# Patient Record
Sex: MALE | Race: WHITE | NOT HISPANIC OR LATINO | Employment: UNEMPLOYED | URBAN - METROPOLITAN AREA
[De-identification: names, ages, dates, MRNs, and addresses within clinical notes are randomized per-mention and may not be internally consistent; named-entity substitution may affect disease eponyms.]

---

## 2017-01-05 ENCOUNTER — MYC MEDICAL ADVICE (OUTPATIENT)
Dept: FAMILY MEDICINE | Facility: CLINIC | Age: 27
End: 2017-01-05

## 2017-01-05 DIAGNOSIS — N39.0 URINARY TRACT INFECTION ASSOCIATED WITH INDWELLING URETHRAL CATHETER, INITIAL ENCOUNTER (H): Primary | ICD-10-CM

## 2017-01-05 DIAGNOSIS — T83.511A URINARY TRACT INFECTION ASSOCIATED WITH INDWELLING URETHRAL CATHETER, INITIAL ENCOUNTER (H): Primary | ICD-10-CM

## 2017-01-14 ENCOUNTER — TRANSFERRED RECORDS (OUTPATIENT)
Dept: HEALTH INFORMATION MANAGEMENT | Facility: CLINIC | Age: 27
End: 2017-01-14

## 2017-01-16 ENCOUNTER — CARE COORDINATION (OUTPATIENT)
Dept: CARE COORDINATION | Facility: CLINIC | Age: 27
End: 2017-01-16

## 2017-01-16 NOTE — Clinical Note
"    Your frustration is understandable.   I will see what I can do on my end.     Teddy Zamora RN  Care Coordinator  [facility]  [phone]  [place]: [place], [place], [facility], [place]    From: Ayana Prasad [[email or web address]]   Sent: Friday, January 27, 2017 11:16 AM  To: Teddy Zamora  Subject: RE: Transportation resources    I didnt/dont have all the paperwork when i saw him and i asked him if i could just fax it to him as it came in the mail so i didnt have to make an appointment every time i got something he needed to fill out. I faxed the transportation paperwork yesterday to the # he gave me... i made an appt with the pain clinic already - the earliest avaliable wasnt until March.   I dont know how to get refills or pain/anxiety medication for the weekend. She prescribed me new pain meds today but i was told i could not get them and im completly out as of today. Also, she was only willing to refill some of my meds so i did contact dr pond care team but she didnt know if it could be done today. I dont see my psychiatriat until wednesday so im not sure what to do.  Also, what i meant by 'i dont know what to do next' is different people keep telling me to apply for this ... or see this person... and im getting frustrated and confused.   Ayana     On Jan 27, 2017 10:39 AM, \"Teddy Zamora\" <[email or web address]> wrote:  Ayana,     I don t see any notes in your chart about paperwork.  Did you bring these in when you saw Dr. Ragsdale on the 1-20?   It looks like you have an [place] type of insurance and could also explore transportation through:     [place] Product Transportation Services:  [place]: MNET: [phone]  Blue Cross [place]:  Blue Ride: [phone]     I don t see any upcoming appointments in your [place] chart. I do see that there was a Pain Clinic referral put in for you after your visit today. If you do not hear from the pain clinic to schedule the initial " appointment the number to call is [phone].     As far as what to do next below are the patient instructions from the last 2 visits.      From SUSANNAH Freeman 1-27  Patient Instructions      I have prescribed a longer acting pain medication, reduce the amount of oxycodone immediate release that you are using. You need to schedule with pain management, to continue receiving ongoing pain medication for your health issue; we will co-manage your medication based on their recommendations.  Do not use more pain medication than what is needed, as this can be habit forming. I will let you know the results of your lab results. Follow up if you have any health questions or concerns.  Work with the , for concerns regarding transportation to your appointments. Make sure that you see your surgeon next week as you are scheduled, sooner if your symptoms worsen.              From Dr. Ragsdale  1-20  Patient Instructions    Wear your compression stockings as much as possible.  Continue on the Lovenox until you are moving around better.  Continue with the physical therapy.  See urology, neurosurgery and psych as planned.    Let your psychiatrist know that you'd rather not be on benzodiazepines and neither would I so adjusting medical therapy to minimize anxiety is helpful. Please provide suggestions if symptoms are not well controlled.                  Teddy Zamora RN  Care Coordinator  [facility]  [phone]  [place]: [place], [place]     From: Ayana Prasad [[email or web address]]   Sent: Friday, January 27, 2017 10:08 AM  To: Teddy Zamora  Subject: Re: Transportation resources     Im just waiting for the paperwork to get filled out by dr. Ragsdale and then i have rides thru my insurance. Dr. Ragsdale said the  could help me with paperwork, health care dirwctive and kind of guide me in a direction because i have no idea of what im suppose to be  "doing and who im suppose to be seeing when. My nurse wanted me to see there  but ahe said my insurance didnt cover it so, If you or someone you know could help me it would be greatly appreciated.   Ayana         On Jan 27, 2017 9:40 AM, \"Teddy Zamora\" <[email or web address]> wrote:  Ayana,     Below are some transportation resources to explore.  Let me know if you need anything else.     TRANSPORTATION:  [employer]     [phone]  [employer]     [phone]  Transit Link     [phone]  [employer]     [phone]  [employer]     [phone]  [employer]      [phone]  Transportation Resource Center     [phone]  Clinic Cab ([facility])    [phone]     COMMUNITY RESOURCE LINE:  [facility] 2-1-1     211 from land line  [phone]  Disability Linkage Line     [phone]           Teddy Zamora RN  Care Coordinator  [facility]  [phone]  [place]: [facility], [place]         The information transmitted in this e-mail is intended only for the person or entity to which it is addressed and may contain confidential and/or privileged material, including 'protected health information'. If you are not the intended recipient, you are hereby notified that any review, retransmission, dissemination, distribution, or copying of this message is strictly prohibited. If you have received this communication in error, please destroy and delete this message from any computer and contact us immediately by return e-mail.          The information transmitted in this e-mail is intended only for the person or entity to which it is addressed and may contain confidential and/or privileged material, including 'protected health information'. If you are not the intended recipient, you are hereby notified that any review, retransmission, dissemination, distribution, or copying of this message is strictly prohibited. If " you have received this communication in error, please destroy and delete this message from any computer and contact us immediately by return e-mail.

## 2017-01-16 NOTE — Clinical Note
Below are some resources you might find helpful.    TRANSPORTATION:  [employer]     [phone]  [employer]     [phone]  Transit Link     [phone]  [employer]     [phone]  [place]     [phone]  [facility]      [phone]  Transportation Resource Center     [phone]  Clinic Cab ([facility])    [phone]    COMMUNITY RESOURCE LINE:  [facility] 2-1-1     211 from land line  [phone]  Disability Linkage Line     [phone]

## 2017-01-16 NOTE — Clinical Note
TRANSPORTATION:    [place] Product Transportaion Services:  [place]: MNET: [phone]  [employer] Cross [place]:  Blue Ride: [phone]

## 2017-01-20 ENCOUNTER — OFFICE VISIT (OUTPATIENT)
Dept: FAMILY MEDICINE | Facility: CLINIC | Age: 27
End: 2017-01-20
Payer: COMMERCIAL

## 2017-01-20 VITALS
TEMPERATURE: 98.7 F | SYSTOLIC BLOOD PRESSURE: 105 MMHG | BODY MASS INDEX: 30.86 KG/M2 | WEIGHT: 192 LBS | HEIGHT: 66 IN | DIASTOLIC BLOOD PRESSURE: 80 MMHG

## 2017-01-20 DIAGNOSIS — G83.4 CAUDA EQUINA SYNDROME WITH NEUROGENIC BLADDER (H): Primary | ICD-10-CM

## 2017-01-20 DIAGNOSIS — F31.11 BIPOLAR 1 DISORDER, MANIC, MILD (H): ICD-10-CM

## 2017-01-20 DIAGNOSIS — Z23 NEED FOR PROPHYLACTIC VACCINATION AND INOCULATION AGAINST INFLUENZA: ICD-10-CM

## 2017-01-20 DIAGNOSIS — G82.20 PARAPLEGIA (H): ICD-10-CM

## 2017-01-20 PROCEDURE — 90686 IIV4 VACC NO PRSV 0.5 ML IM: CPT | Performed by: FAMILY MEDICINE

## 2017-01-20 PROCEDURE — 99496 TRANSJ CARE MGMT HIGH F2F 7D: CPT | Mod: 25 | Performed by: FAMILY MEDICINE

## 2017-01-20 PROCEDURE — G0008 ADMIN INFLUENZA VIRUS VAC: HCPCS | Performed by: FAMILY MEDICINE

## 2017-01-20 RX ORDER — OLANZAPINE 5 MG/1
5 TABLET ORAL
Status: ON HOLD | COMMUNITY
Start: 2017-01-18 | End: 2017-06-08

## 2017-01-20 RX ORDER — BISACODYL 10 MG
10 SUPPOSITORY, RECTAL RECTAL
COMMUNITY
Start: 2017-01-18 | End: 2017-05-09

## 2017-01-20 RX ORDER — AMOXICILLIN 250 MG
3 CAPSULE ORAL
COMMUNITY
End: 2017-01-27

## 2017-01-20 RX ORDER — ONDANSETRON 4 MG/1
TABLET, FILM COATED ORAL
Refills: 0 | COMMUNITY
Start: 2017-01-18 | End: 2017-01-27

## 2017-01-20 RX ORDER — GABAPENTIN 300 MG/1
900 CAPSULE ORAL 3 TIMES DAILY
Qty: 120 CAPSULE | Refills: 0 | COMMUNITY
Start: 2017-01-20 | End: 2017-01-27

## 2017-01-20 RX ORDER — ENOXAPARIN SODIUM 100 MG/ML
40 INJECTION SUBCUTANEOUS EVERY 24 HOURS
COMMUNITY
End: 2017-01-27

## 2017-01-20 RX ORDER — ALPRAZOLAM 0.5 MG
TABLET ORAL
COMMUNITY
Start: 2017-01-18 | End: 2017-05-05

## 2017-01-20 RX ORDER — HYDROXYZINE PAMOATE 25 MG/1
25-50 CAPSULE ORAL
COMMUNITY
Start: 2017-01-18 | End: 2017-01-27

## 2017-01-20 RX ORDER — OXYCODONE HYDROCHLORIDE 5 MG/1
5-10 TABLET ORAL
COMMUNITY
Start: 2017-01-19 | End: 2017-05-09

## 2017-01-20 NOTE — MR AVS SNAPSHOT
After Visit Summary   1/20/2017    Ayana Prasad    MRN: [id number]           Patient Information     Date Of Birth          1990        Visit Information        Provider Department      1/20/2017 3:30 PM Chandana Ragsdale MD [facility]        Today's Diagnoses     Bipolar 1 disorder, manic, mild    -  1     Cauda equina syndrome with neurogenic bladder (H)         Paraplegia (H)           Care Instructions    Wear your compression stockings as much as possible.  Continue on the Lovenox until you are moving around better.  Continue with the physical therapy.  See urology, neurosurgery and psych as planned.   Let your pyschiatrist know that you'd rather not be on benzodiazepines and neither would I so adjusting medical therapy to minimize anxiedty is helpful. Please provide suggestions if symptoms are not well controlled.          Follow-ups after your visit        Who to contact     Normal or non-critical lab and imaging results will be communicated to you by Terrajoulehart, letter or phone within 4 business days after the clinic has received the results. If you do not hear from us within 7 days, please contact the clinic through [employer] or phone. If you have a critical or abnormal lab result, we will notify you by phone as soon as possible.  Submit refill requests through mEgo or call your pharmacy and they will forward the refill request to us. Please allow 3 business days for your refill to be completed.          If you need to speak with a  for additional information , please call: [phone]           Additional Information About Your Visit        mEgo Information     mEgo gives you secure access to your electronic health record. If you see a primary care provider, you can also send messages to your care team and make appointments. If you have questions, please call your primary care clinic.  If you do not have a primary care provider, please call  "[phone] and they will assist you.        Care EveryWhere ID     This is your Care EveryWhere ID. This could be used by other organizations to access your [place] medical records  [id number]        Your Vitals Were     Temperature Height BMI (Body Mass Index) Last Period Breastfeeding?       98.7  F (37.1  C) (Tympanic) 5' 6\" (1.676 m) 31.00 kg/m2 01/10/2017 (Approximate) No        Blood Pressure from Last 3 Encounters:   01/20/17 105/80   11/03/16 135/92   07/21/16 134/90    Weight from Last 3 Encounters:   01/20/17 192 lb (87.091 kg)   11/03/16 190 lb (86.183 kg)   07/21/16 195 lb 12.8 oz (88.814 kg)              We Performed the Following     PRESERV FREE INFLU VAC SPLIT (3+YRS),IM          Today's Medication Changes          These changes are accurate as of: 1/20/17  4:44 PM.  If you have any questions, ask your nurse or doctor.               These medicines have changed or have updated prescriptions.        Dose/Directions    * enoxaparin 100 MG/ML Soln   Commonly known as:  LOVENOX   This may have changed:  Another medication with the same name was added. Make sure you understand how and when to take each.   Changed by:  Chandana Rgasdale MD        Dose:  40 mg   Inject 40 mg Subcutaneous every 24 hours   Refills:  0       * enoxaparin 100 MG/ML injection   Commonly known as:  LOVENOX   This may have changed:  You were already taking a medication with the same name, and this prescription was added. Make sure you understand how and when to take each.   Used for:  Cauda equina syndrome with neurogenic bladder (H), Paraplegia (H)   Changed by:  Chandana Ragsdale MD        Dose:  40 mg   Inject 0.4 mLs (40 mg) Subcutaneous daily   Quantity:  10 Syringe   Refills:  1       gabapentin 300 MG capsule   Commonly known as:  NEURONTIN   This may have changed:    - how much to take  - how to take this  - when to take this   Used for:  Bipolar 1 disorder, manic, mild (H)   Changed by:  Chandana Ragsdale MD     "    Dose:  900 mg   Take 3 capsules (900 mg) by mouth 3 times daily 2 caps by mouth at bedtime and 1 cap by mouth 2 times daily as needed   Quantity:  120 capsule   Refills:  0       * Notice:  This list has 2 medication(s) that are the same as other medications prescribed for you. Read the directions carefully, and ask your doctor or other care provider to review them with you.      Stop taking these medicines if you haven't already. Please contact your care team if you have questions.     FLUoxetine 20 MG capsule   Commonly known as:  PROzac   Stopped by:  Chandana Ragsdale MD           lamoTRIgine 200 MG tablet   Commonly known as:  LaMICtal   Stopped by:  Chandana Ragsdale MD           QUEtiapine 25 MG tablet   Commonly known as:  SEROQUEL   Stopped by:  Chandana Ragsdale MD           ziprasidone 80 MG capsule   Commonly known as:  GEODON   Stopped by:  Chandana Ragsdale MD                Where to get your medicines      These medications were sent to [facility] [place]  [place]  [address] [facility]  [id number] [place], [place] [address] [address]     Phone:  [phone]    - enoxaparin 100 MG/ML injection             Primary Care Provider Office Phone # Fax #    Chandana Ragsdale [phone]       [facility] [id number] [place] [address] [facility]  [place] [address]        Thank you!     Thank you for choosing [facility]  for your care. Our goal is always to provide you with excellent care. Hearing back from our patients is one way we can continue to improve our services. Please take a few minutes to complete the written survey that you may receive in the mail after your visit with us. Thank you!             Your Updated Medication List - Protect others around you: Learn how to safely use, store and throw away your medicines at www.disposemymeds.org.          This list is accurate as of: 1/20/17  4:44 PM.  Always use your most recent med list.                   Brand  Name Dispense Instructions for use    ALPRAZolam 0.5 MG tablet    XANAX         BACLOFEN PO      Take 10 mg by mouth 3 times daily       CYMBALTA PO      Take 30 mg by mouth       * enoxaparin 100 MG/ML Soln    LOVENOX     Inject 40 mg Subcutaneous every 24 hours       * enoxaparin 100 MG/ML injection    LOVENOX    10 Syringe    Inject 0.4 mLs (40 mg) Subcutaneous daily       gabapentin 300 MG capsule    NEURONTIN    120 capsule    Take 3 capsules (900 mg) by mouth 3 times daily 2 caps by mouth at bedtime and 1 cap by mouth 2 times daily as needed       * hydrOXYzine 50 MG capsule    VISTARIL    120 capsule    Take 1 capsule (50 mg) by mouth every 6 hours as needed       * hydrOXYzine 25 MG capsule    VISTARIL     Take 25-50 mg by mouth       LAXATIVE 10 MG Suppository   Generic drug:  bisacodyl      Place 10 mg rectally       lidocaine 2 % topical gel    XYLOCAINE         OLANZapine 5 MG tablet    zyPREXA     Take 5 mg by mouth       ondansetron 4 MG tablet    ZOFRAN         oxyCODONE 5 MG IR tablet    ROXICODONE     Take 5-10 mg by mouth       senna-docusate 8.6-50 MG per tablet    SENOKOT-S;PERICOLACE     Take 3 tablets by mouth       TRAZODONE HCL PO      Take 100 mg by mouth At Bedtime       TYLENOL PO      Take 650 mg by mouth       * Notice:  This list has 4 medication(s) that are the same as other medications prescribed for you. Read the directions carefully, and ask your doctor or other care provider to review them with you.

## 2017-01-20 NOTE — PROGRESS NOTES
SUBJECTIVE:                                                    Ayana Prasad is a 26 year old female who presents to clinic today for the following health issues:      Hospital Follow-up Visit:    Hospital/Nursing Home/IP Rehab Facility: [facility] and     Date of Admission: [facility] 12/25/2016 transferred to [place] the 28th of December.   Date of Discharge: 1/18/017  Reason(s) for Admission: Cada Equina Syhdrome with significant persisting neurologic deficits in both lower extremities.             Problems taking medications regularly:  None       Medication changes since discharge: Yes all medications are entered       Problems adhering to non-medication therapy:  None    Summary of hospitalization:  CareEverywhere information obtained and reviewed.  Acute decomplression for acute symptoms.   Diagnostic Tests/Treatments reviewed.  Follow up needed: multiple specialist including neurosurgery, urology, psychiatry, rehab. Psychiatry managed medications and some changes.  Feels actually more stable now since back issues. Would like to stay with primary care provider for mangement of psychg medications.  recommendedd onging relationship with psychiatry due to complexity.    Other Healthcare Providers Involved in Patient s Care:         Homecare, Physical Therapy and urology, spine surgery.   Update since discharge: improved just a bit.  Living with parents again.     Post Discharge Medication Reconciliation: discharge medications reconciled, continue medications without change.  Plan of care communicated with patient     Coding guidelines for this visit:  Type of Medical   Decision Making Face-to-Face Visit       within 7 Days of discharge Face-to-Face Visit        within 14 days of discharge   Moderate Complexity 85704 50024   High Complexity 46125 88648                Problem list and histories reviewed & adjusted, as indicated.  Additional history: as documented    Problem list, Medication list,  "Allergies, and Medical/Social/Surgical histories reviewed in EPIC and updated as appropriate.    1. Cauda equina syndrome with neurogenic bladder (H)    2. Bipolar 1 disorder, manic, mild    3. Paraplegia (H)    4. Need for prophylactic vaccination and inoculation against influenza        PMH: Updated and/or reviewed in chart.    PSH: Updated and/or reviewed in chart.    Family History: Updated and/or reviewed in chart.     ROS:  Constitutional, HEENT, cardiovascular, pulmonary, gi and gu systems are otherwise negative.    OBJECTIVE:                                                    /80 mmHg  Temp(Src) 98.7  F (37.1  C) (Tympanic)  Ht 5' 6\" (1.676 m)  Wt 192 lb (87.091 kg)  BMI 31.00 kg/m2  LMP 01/10/2017 (Approximate)  Breastfeeding? No  GENERAL: Pleasant and interactive.  Alert and oriented x 3.  No acute distress. Sitting wheelchair.   PSYCH: Alert and oriented times 3; coherent speech, normal rate and volume, able to articulate logical thoughts, able to abstract reason, no tangential thoughts, no hallucinations or delusions  Her affect is normal.  EXTREM: unable to raise toes.  Wearing AFO on left .  Reduced sensationin both legs.         Results for orders placed or performed in visit on 11/07/16   MR Lumbar Spine w/o Contrast    Narrative    MR LUMBAR SPINE WITHOUT CONTRAST 11/7/2016 7:43 PM     HISTORY: Low back pain. Unspecified injury of lower back, initial  encounter. Low back and bilateral leg pain.    TECHNIQUE:  Sagittal T1 and STIR. Sagittal T2 and axial dual-echo T2.     FINDINGS:  Five lumbar vertebrae are assumed. There is disc  dehydration at L5-S1.     Findings by specific level:    There is minimal multilevel facet degenerative change.    L5-S1: There is a moderate-large central and left parasagittal disc  extrusion with mild caudal extension of disc material. This abuts both  S1 nerve roots (more so on the left). There is congenital tapering of  the thecal sac and no significant " thecal sac compression. Disc bulging  elsewhere. There is mild right foraminal stenosis and the left neural  foramen appears adequate.    No disc herniation or stenosis is seen at any other lumbar level.      Impression    IMPRESSION:    L5-S1: Moderate-large left asymmetric disc extrusion    WERO BEAN MD      ASSESSMENT/PLAN:                                                        ICD-10-CM    1. Cauda equina syndrome with neurogenic bladder (H) - Severe with significant sequelae. The longer she goes with minimal functional improvement does not guille well for complete recovery. G83.4 enoxaparin (LOVENOX) 100 MG/ML injection   2. Bipolar 1 disorder, manic, mild - stable F31.11 gabapentin (NEURONTIN) 300 MG capsule   3. Paraplegia (H) - as above.  Concern at this point about ongoing dvt prophylaxis as she has many hours daily of reduces venous flow. Not currently wearing compression stockings.  G82.20 enoxaparin (LOVENOX) 100 MG/ML injection     CANCELED: PRESERV FREE INFLU VAC SPLIT (3+YRS),IM   4. Need for prophylactic vaccination and inoculation against influenza Z23 FLU VAC, SPLIT VIRUS IM > 3 YO (QUADRIVALENT) [73124]     Vaccine Administration, Initial [91855]       Care plan updated in chart for chronic problems.    Patient Instructions   Wear your compression stockings as much as possible.  Continue on the Lovenox until you are moving around better.  Continue with the physical therapy.  See urology, neurosurgery and psych as planned.   Let your pyschiatrist know that you'd rather not be on benzodiazepines and neither would I so adjusting medical therapy to minimize anxiedty is helpful. Please provide suggestions if symptoms are not well controlled.         Orders Placed This Encounter     Acetaminophen (TYLENOL PO)     BACLOFEN PO     DULoxetine HCl (CYMBALTA PO)     enoxaparin (LOVENOX) 100 MG/ML SOLN     senna-docusate (SENOKOT-S;PERICOLACE) 8.6-50 MG per tablet     TRAZODONE HCL PO     oxyCODONE  (ROXICODONE) 5 MG IR tablet     ondansetron (ZOFRAN) 4 MG tablet     OLANZapine (ZYPREXA) 5 MG tablet     lidocaine (XYLOCAINE) 2 % topical gel     hydrOXYzine (VISTARIL) 25 MG capsule     ALPRAZolam (XANAX) 0.5 MG tablet     bisacodyl (LAXATIVE) 10 MG Suppository     gabapentin (NEURONTIN) 300 MG capsule      Goals     None         Made a point to impress upon her the importance of movement and use of compression stockings to prevent thrombosis.  She understands goal of regular activity as well before we can safely discontinue the lwo molecular weight heparin injection. Will continue for another 10-14 days with goals of discontinuing.     Chandana Ragsdale MD

## 2017-01-20 NOTE — PROGRESS NOTES
Injectable Influenza Immunization Documentation    1.  Is the person to be vaccinated sick today?  No    2. Does the person to be vaccinated have an allergy to eggs or to a component of the vaccine?  No    3. Has the person to be vaccinated today ever had a serious reaction to influenza vaccine in the past?  No    4. Has the person to be vaccinated ever had Guillain-Minier syndrome?  No     Form completed by Sarahi Abraham CMA

## 2017-01-20 NOTE — NURSING NOTE
"Chief Complaint   Patient presents with     Hospital F/U       Initial /80 mmHg  Temp(Src) 98.7  F (37.1  C) (Tympanic)  Ht 5' 6\" (1.676 m)  Wt 192 lb (87.091 kg)  BMI 31.00 kg/m2  LMP 01/10/2017 (Approximate)  Breastfeeding? No Estimated body mass index is 31 kg/(m^2) as calculated from the following:    Height as of this encounter: 5' 6\" (1.676 m).    Weight as of this encounter: 192 lb (87.091 kg).  BP completed using cuff size: zamzam Abraham CMA      "

## 2017-01-20 NOTE — PATIENT INSTRUCTIONS
Wear your compression stockings as much as possible.  Continue on the Lovenox until you are moving around better.  Continue with the physical therapy.  See urology, neurosurgery and psych as planned.   Let your pyschiatrist know that you'd rather not be on benzodiazepines and neither would I so adjusting medical therapy to minimize anxiedty is helpful. Please provide suggestions if symptoms are not well controlled.

## 2017-01-27 ENCOUNTER — TELEPHONE (OUTPATIENT)
Dept: FAMILY MEDICINE | Facility: CLINIC | Age: 27
End: 2017-01-27

## 2017-01-27 ENCOUNTER — TELEPHONE (OUTPATIENT)
Dept: PALLIATIVE MEDICINE | Facility: CLINIC | Age: 27
End: 2017-01-27

## 2017-01-27 ENCOUNTER — OFFICE VISIT (OUTPATIENT)
Dept: FAMILY MEDICINE | Facility: CLINIC | Age: 27
End: 2017-01-27
Payer: COMMERCIAL

## 2017-01-27 VITALS
HEART RATE: 85 BPM | SYSTOLIC BLOOD PRESSURE: 116 MMHG | DIASTOLIC BLOOD PRESSURE: 76 MMHG | OXYGEN SATURATION: 97 % | TEMPERATURE: 98 F

## 2017-01-27 DIAGNOSIS — K59.02 CONSTIPATION DUE TO OUTLET DYSFUNCTION: ICD-10-CM

## 2017-01-27 DIAGNOSIS — R82.90 NONSPECIFIC FINDING ON EXAMINATION OF URINE: ICD-10-CM

## 2017-01-27 DIAGNOSIS — Z97.8 FOLEY CATHETER IN PLACE: ICD-10-CM

## 2017-01-27 DIAGNOSIS — F99 INSOMNIA DUE TO OTHER MENTAL DISORDER: ICD-10-CM

## 2017-01-27 DIAGNOSIS — M54.9 INTRACTABLE BACK PAIN: ICD-10-CM

## 2017-01-27 DIAGNOSIS — F19.10 POLYSUBSTANCE ABUSE (H): ICD-10-CM

## 2017-01-27 DIAGNOSIS — N39.0 URINARY TRACT INFECTION ASSOCIATED WITH INDWELLING URETHRAL CATHETER, INITIAL ENCOUNTER (H): ICD-10-CM

## 2017-01-27 DIAGNOSIS — L29.9 ITCHING: ICD-10-CM

## 2017-01-27 DIAGNOSIS — G83.4 CAUDA EQUINA SYNDROME WITH NEUROGENIC BLADDER (H): Primary | ICD-10-CM

## 2017-01-27 DIAGNOSIS — T83.511A URINARY TRACT INFECTION ASSOCIATED WITH INDWELLING URETHRAL CATHETER, INITIAL ENCOUNTER (H): ICD-10-CM

## 2017-01-27 DIAGNOSIS — F51.05 INSOMNIA DUE TO OTHER MENTAL DISORDER: ICD-10-CM

## 2017-01-27 DIAGNOSIS — F31.11 BIPOLAR 1 DISORDER, MANIC, MILD (H): ICD-10-CM

## 2017-01-27 DIAGNOSIS — G82.20 PARAPLEGIA (H): ICD-10-CM

## 2017-01-27 LAB
ALBUMIN UR-MCNC: 100 MG/DL
APPEARANCE UR: ABNORMAL
BACTERIA #/AREA URNS HPF: ABNORMAL /HPF
BILIRUB UR QL STRIP: NEGATIVE
COLOR UR AUTO: YELLOW
GLUCOSE UR STRIP-MCNC: NEGATIVE MG/DL
HGB UR QL STRIP: ABNORMAL
KETONES UR STRIP-MCNC: NEGATIVE MG/DL
LEUKOCYTE ESTERASE UR QL STRIP: ABNORMAL
MUCOUS THREADS #/AREA URNS LPF: PRESENT /LPF
NITRATE UR QL: POSITIVE
NON-SQ EPI CELLS #/AREA URNS LPF: ABNORMAL /LPF
PH UR STRIP: 7 PH (ref 5–7)
RBC #/AREA URNS AUTO: ABNORMAL /HPF (ref 0–2)
SP GR UR STRIP: 1.02 (ref 1–1.03)
URN SPEC COLLECT METH UR: ABNORMAL
UROBILINOGEN UR STRIP-ACNC: 0.2 EU/DL (ref 0.2–1)
WBC #/AREA URNS AUTO: >100 /HPF (ref 0–2)

## 2017-01-27 PROCEDURE — 81001 URINALYSIS AUTO W/SCOPE: CPT | Performed by: NURSE PRACTITIONER

## 2017-01-27 PROCEDURE — 87088 URINE BACTERIA CULTURE: CPT | Performed by: NURSE PRACTITIONER

## 2017-01-27 PROCEDURE — 87186 SC STD MICRODIL/AGAR DIL: CPT | Performed by: NURSE PRACTITIONER

## 2017-01-27 PROCEDURE — 87086 URINE CULTURE/COLONY COUNT: CPT | Performed by: NURSE PRACTITIONER

## 2017-01-27 PROCEDURE — 99215 OFFICE O/P EST HI 40 MIN: CPT | Performed by: NURSE PRACTITIONER

## 2017-01-27 RX ORDER — SULFAMETHOXAZOLE/TRIMETHOPRIM 800-160 MG
1 TABLET ORAL 2 TIMES DAILY
Qty: 14 TABLET | Refills: 0 | Status: SHIPPED | OUTPATIENT
Start: 2017-01-27 | End: 2017-05-05

## 2017-01-27 RX ORDER — AMOXICILLIN 250 MG
3 CAPSULE ORAL DAILY PRN
Qty: 100 TABLET | Refills: 0 | Status: SHIPPED | OUTPATIENT
Start: 2017-01-27 | End: 2017-05-09

## 2017-01-27 RX ORDER — BACLOFEN 10 MG/1
10 TABLET ORAL 3 TIMES DAILY
Qty: 90 TABLET | Refills: 1 | Status: SHIPPED | OUTPATIENT
Start: 2017-01-27 | End: 2017-01-27

## 2017-01-27 RX ORDER — OXYCODONE HCL 10 MG/1
10 TABLET, FILM COATED, EXTENDED RELEASE ORAL EVERY 12 HOURS
Qty: 60 TABLET | Refills: 0 | Status: SHIPPED | OUTPATIENT
Start: 2017-01-27 | End: 2017-05-05

## 2017-01-27 RX ORDER — TRAZODONE HYDROCHLORIDE 100 MG/1
100 TABLET ORAL
Qty: 90 TABLET | Refills: 1 | Status: ON HOLD | OUTPATIENT
Start: 2017-01-27 | End: 2017-06-08

## 2017-01-27 RX ORDER — DULOXETIN HYDROCHLORIDE 30 MG/1
30 CAPSULE, DELAYED RELEASE ORAL DAILY
Qty: 90 CAPSULE | Refills: 1 | Status: SHIPPED | OUTPATIENT
Start: 2017-01-27 | End: 2017-04-27

## 2017-01-27 NOTE — TELEPHONE ENCOUNTER
Hello,    This drug Oxycodone ER 10mg requires prior authorization. Please contact insurance at [phone] FAX: [phone]  .   Patient's ID [id number] .    Please contact pharmacy when prior authorization has been approved. We will contact patient when order has been filled.     Thanks,     Ana Luisa Roberson, Pharmacy Tech  [place]

## 2017-01-27 NOTE — PROGRESS NOTES
Patient answered e-mail with resources.  (see letters for correspondence)    Patient out of medications and needs refills for this weekend.     Plan:  Care team to review and advise on medication refills.    Teddy Zamora RN  Clinic Care Coordinator  [phone] or [phone]

## 2017-01-27 NOTE — PROGRESS NOTES
Clinic Care Coordination Contact  [facility]/Voicemail    Referral Source: [facility]  Clinical Data: Care Coordinator Outreach  Outreach attempted x 1.  Left message on voicemail with call back information and requested return call. Will e-mail transport resources.  Plan: Care Coordinator mailed out care coordination introduction letter on 1-13. Care Coordinator will try to reach patient again in 3-5 business days.  Teddy Zamora RN  Clinic Care Coordinator  [phone] or [phone]

## 2017-01-27 NOTE — TELEPHONE ENCOUNTER
Pt was prescribed 100 tablets of Roxicodone on 1/19/17, pt should contact her surgeon if she is having this amount of pain. BROOKLYN Posada, FNP-BC

## 2017-01-27 NOTE — TELEPHONE ENCOUNTER
Pain Management Center Referral      1. Confirmed address with patient? Yes  2. Confirmed phone number with patient? Yes  3. Confirmed referring provider? Yes  4. Is the PCP the same as the referring provider? Yes  5. Has the patient been to any previous pain clinics? No  (If yes, send VAL with welcome letter)  6. Which insurance are we to bill for this appointment?  Medical Assistance    7. Informed pt of cancellation (48 hour) policy? Yes    REGARDING OPIOID MEDICATIONS: We will always address appropriateness of opioid pain medications, but we generally will not automatically take on a prescribing role. When we do take on prescribing of opioids for chronic pain, it is in collaboration with the referring physician for an intermediate period of time (months), with an expectation that the primary physician or provider will assume the prescribing role if medications are effective at stable doses with demonstrated compliance. Therefore, please do not assume that your prescribing responsibilities end on the day of pain clinic consultation.  7. Informed pt of prescribing policy? Yes      8. Referring Provider: Chandana Ragsdale

## 2017-01-27 NOTE — Clinical Note
January 27, 2017    Ayana Prasad  [address] [facility] [address]    Dear Ayana,                                                                 Welcome to the [facility].  We are located on the 2nd floor ([address]) of the [facility], located at [address] [place], [place] [address], [place] [address].    Your appointment at the [facility] has been scheduled on March 17th at 3:00pm with Chandana Mccarty MD.    At your first visit, you will meet your team of caregivers who will help you to develop pain management strategies that will last a lifetime. You will meet with our support staff to review your insurance information, and collect your co-payment if required by your insurance company. You will also meet with a medical pain specialist and care coordinator who will assess your pain and develop a plan of care for your successful pain rehabilitation. You should expect to spend 1-2 hours at your first visit with us. Usually, patients work with us for a period of 6-12 months, and eventually return to their primary doctor once their pain management has stabilized.      To help us make your visit go as smoothly as possible, please bring the following items with you on your visit:     Completed Pain Questionnaire enclosed in this packet.  If you do not bring the completed questionnaire, we may have to reschedule your appointment.  List of any medicines that you are currently taking or have been prescribed  Important NON-[facility] medical information such as medical records or tests results (X-rays, or laboratory tests)  Your health insurance card  Financial resources to cover your co-payment or balance due at the time of service (cash, personal check, Visa, and MasterCard are acceptable methods of payment)     Due to the demand for new patient evaluations, you must notify the scheduling department 48 hours in advance if you are not able to keep this appointment.  Failure to do so could affect your ability to reschedule with our clinic. Please be aware that we will not prescribe any medications at your first visit.     Please call [phone] with any questions regarding your appointment. We look forward to meeting you and working to address your health care needs.     Sincerely,    [facility]

## 2017-01-27 NOTE — TELEPHONE ENCOUNTER
Spoke with Ayana and she stated the pharmacy said she would need P.A. before they will fill the OxyContin that you prescribed her today. She is wondering if you would refill her oxycodone to get her through the weekend until the P.A. can be done on the new medication.   Please advise.   Laisha Bradford RN

## 2017-01-27 NOTE — TELEPHONE ENCOUNTER
Left message on voice mail for patient to call clinic. [phone]  Left message to get more information regarding pain medications.   Laisha Bradford RN
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
## 2017-01-27 NOTE — PROGRESS NOTES
Per MG medications gave been refilled. Antibiotic sent. All others that were due were filled. Any other medication has refills or patient was recently given a script.    Called pt and informed her of this. Светлана Pearson MA

## 2017-01-27 NOTE — NURSING NOTE
"Chief Complaint   Patient presents with     Musculoskeletal Problem       Initial /76 mmHg  Pulse 85  Temp(Src) 98  F (36.7  C) (Oral)  Ht   Wt   SpO2 97%  LMP 01/10/2017 (Approximate) Estimated body mass index is 31.00 kg/(m^2) as calculated from the following:    Height as of 1/20/17: 5' 6\" (1.676 m).    Weight as of 1/20/17: 192 lb (87.091 kg)..  BP completed using cuff size: zamzam Pearson MA   "
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
## 2017-01-27 NOTE — PROGRESS NOTES
Quick Note:    Please call with results. Positive UTI. ABX sent to pharmacy. Please take full course of antibiotics. We will call if urine culture shows that we need to change the antibiotic. Follow up if your symptoms persist or worsen.     Linh Freeman NP  ______

## 2017-01-27 NOTE — Clinical Note
[facility]AILESH    01/27/2017    Patient: Ayana Prasad  YOB: 1990  Medical Record Number: [id number]                                                                  Controlled Substance Agreement  I understand that my care provider has prescribed controlled substances (narcotics, tranquilizers, and/or stimulants) to help manage my condition(s).  I am taking this medicine to help me function or work.  I know that this is strong medicine.  It could have serious side effects and even cause a dependency on the drug.  If I stop these medicines suddenly, I could have severe withdrawal symptoms.    The risks, benefits, and side effects of these medication(s) were explained to me.  I agree that:  1. I will take part in other treatments as advised by my provider.  This may be psychiatry or counseling, physical therapy, behavioral therapy, group treatment, or a referral to a pain clinic.  I will reduce or stop my medicine when my provider tells me to do so.   2. I will take my medicines as prescribed.  I will not change the dose or schedule unless my provider tells me to.  There will be no refills if I  run out early.   I may be contacted at any time without warning and asked to complete a drug test or pill count.   3. I will keep all my appointments at the clinic.  If I miss appointments or fail to follow instructions, my provider may stop my medicine.  4. I will not ask other providers to prescribe controlled substances. And I will not accept controlled substances from other people. If I need another prescribed controlled substance for a new reason, I will notify my provider within one business day.  5. If I enroll in the [place] Medical Marijuana program, I will tell my provider.  I will also sign an agreement to share my medical records with my provider.  6. I will use one pharmacy to fill all of my controlled substance prescriptions.  If my prescription is mailed to my pharmacy, it may take 5 to  7 days for my medicine to be ready.  7. I understand that my provider, clinic care team, and pharmacy can track controlled substance prescriptions from other providers through a central database (prescription monitoring program).  8. I will bring in my list of medications (or my medicine bottles) each time I come to the clinic.  REV-  04/2016                                                                                                                                            Page 1 of 2      [facility]    01/27/2017    Patient: Ayana Prasad  YOB: 1990  Medical Record Number: [id number]    9. Refills of controlled substances will be made only during office hours.  It is up to me to make sure that I do not run out of my medicines on weekends or holidays.    10. I am responsible for my prescriptions.  If the medicine is lost or stolen, it will not be replaced.   I also agree not to share these medicines with anyone.  11. I agree to not use ANY illegal or recreational drugs.  This includes marijuana, cocaine, bath salts or other drugs.  I agree not to use alcohol unless my provider says I may.  I agree to give urine samples whenever asked.  If I fail to give a urine sample, the provider may stop my medicine.     12. I will tell my nurse or provider right away if I become pregnant or have a new medical problem treated outside of [facility]  13. I understand that this medicine can affect my thinking and judgment.  It may be unsafe for me to drive, use machinery and do dangerous tasks.  I will not do any of these things until I know how the medicine affects me.  If my dose changes, I will wait to see how it affects me.  I will contact my provider if I have concerns about medicine side effects.  I understand that if I do not follow any of the conditions above, my prescriptions or treatment may be stopped.    I agree that my provider, clinic care team, and pharmacy may work with any city,  state or federal law enforcement agency that investigates the misuse, sale, or other diversion of my controlled medicine. I will allow my provider to discuss my care with or share a copy of this agreement with any other treating provider, pharmacy or emergency room where I receive care.  I agree to give up (waive) any right of privacy or confidentiality with respect to these authorizations.   I have read this agreement and have asked questions about anything I did not understand.   ___________________________________    ___________________________  Patient Signature                                                           Date and Time  ___________________________________     ____________________________  Witness                                                                            Date and Time  ___________________________________  Linh Freeman, SUSANNAH  REV-  04/2016                                                                                                                                                                 Page 2 of 2  Opioid Pain Medicines (also known as Narcotics)  What You Need to Know      What are opioids?   Opioids are pain medicines that must be prescribed by a doctor. Examples are:     morphine (MS Contin, Lorene)    oxycodone (Oxycontin)    oxycodone and acetaminophen (Percocet)    hydrocodone and acetaminophen (Vicodin, Norco)     fentanyl patch (Duragesic)     hydromorphone (Dilaudid)     methadone     What do opioids do well?   Opioids are best for short-term pain after a surgery or injury. They also work well for cancer pain. Unlike other pain medicines, they do not cause liver or kidney failure or ulcers. They may help some people with long-lasting (chronic) pain.     What do opioids NOT do well?   Opioids never get rid of pain entirely, and they do not work well for most patients with chronic pain. Opioids do not reduce swelling, one of the causes of pain. They also don t work  well for nerve pain.     Side effects  Talk to your doctor before you start or decide to keep taking one of these medicines. Side effects include:    Lowers your breathing rate enough that it could cause death    Death due to taking more than the prescribed dose    Serious lifelong opioid use      Dependence is not the same as addiction. Addiction is when people keep using a substance that harms their body, their mind or their relations with others. If you have a history of drug or alcohol abuse, taking opioids can cause a relapse.  Over time, opioids don t work as well. Most people will need higher and higher doses. The higher the dose, the more serious the side effects. We don t know the long-term effects of opioids.   People who have used opioids for a long time have a lower quality of life, worse depression, higher levels of pain and more visits to doctors.  Overdose from prescription drugs is the second leading cause of death in the [place] The risk of overdose rises when opioids are taken with other drugs such as:    Medicines used for anxiety and panic attacks (such as lorazepam, alprazolam, clonazepam    Other sedatives    Alcohol    Illegal drugs such as heroin  Never share your opioids with others. Be sure to store opioids in a secure place, locked if possible.Young children can easily swallow them and overdose.     Are there other ways to manage pain?  Ways to help reduce pain:    Exercise every day.    Treat health problems that may be causing pain.    Treat mental health problems like depression and anxiety.     Worse depression symptoms; Less pleasure in things you usually enjoy    Feeling tired or sluggish    Slower thoughts or cloudy thinking    Being more sensitive to pain over time; Pain is harder to control.    Trouble sleeping or restless sleep    Changes in hormone levels (for example, less testosterone).     Changes in sex drive or ability to have sex    Long lasting nausea and  constipation    Trouble breathing while asleep; This is worse with lung problems like COPD or sleep apnea.    Unsafe driving    Getting sick more often    Itching    Feeling dizzy    Dry mouth    Sweating    Trouble emptying the bladder (peeing). This is worse if you have an enlarged prostate or get urinary tract infections (UTIs).    What else should I know about opioids?  When someone takes opioids for too long or too often, they become dependent. This means that if you stop or reduce the medicine too quickly, you will have withdrawal symptoms.          Practice good sleep habits.  Try to go to bed and get up at the same time every day.    Stop smoking.  Tobacco use can make pain worse.    Do things that you enjoy.    Find a way to work through pain without drugs.  Try deep breathing, meditation, visual imagery and aromatherapy.    Ask your doctor to help you create a plan to manage your pain.

## 2017-01-27 NOTE — PATIENT INSTRUCTIONS
I have prescribed a longer acting pain medication, reduce the amount of oxycodone immediate release that you are using. You need to schedule with pain management, to continue receiving ongoing pain medication for your health issue; we will co-manage your medication based on their recommendations.  Do not use more pain medication than what is needed, as this can be habit forming. I will let you know the results of your lab results. Follow up if you have any health questions or concerns.  Work with the , for concerns regarding transportation to your appointments. Make sure that you see your surgeon next week as you are scheduled, sooner if your symptoms worsen.

## 2017-01-29 ENCOUNTER — TELEPHONE (OUTPATIENT)
Dept: NURSING | Facility: CLINIC | Age: 27
End: 2017-01-29

## 2017-01-29 NOTE — TELEPHONE ENCOUNTER
Call Type: Triage Call    Presenting Problem: Ayana says she was told by her home care nurse to  call the on call MD. Bladder spasms started a couple days ago, are  now very painful. She has taken her prescribed pain medications with  no relief. She states that she was prescribed abx Friday for a UTI  but has not picked it up yet. She is asking if she can get her  script transferred to a 24 hour pharmacy. Pharmacy called to send  script to [facility].  Triage Note:  Guideline Title: Medication Questions - Adult  Recommended Disposition: Provide Health Information  Original Inclination: Wanted to speak with a nurse  Override Disposition:  Intended Action: Follow advice given  Physician Contacted: No  Caller has medication question(s) that was answered with available resources ?  YES  Pregnant and has medication questions regarding prescribed and/or nonprescribed  medication(s) not covered by available resources ? NO  Breastfeeding and has medication questions regarding prescribed and/or  nonprescribed medication(s) not covered by available resources ? NO  Requested information on how to safely dispose of  or unused medications ?  NO  Sign(s) or symptom(s) associated with a diagnosed condition or with a new illness  ? NO  Prescription ordered today and not available at pharmacy putting patient at  clinical risk ? NO  Recurrence of a symptom(s) or illness post prescribed medication treatment AND  provider instructed patient to call if symptom(s) returned. ? NO  Unable to obtain prescribed medication related to available resources AND  situation poses immediate clinical risk ? NO  Pharmacy calling to clarify prescription order. ? NO  Requests refill of prescribed medication that does NOT have a valid refill; lack  of medication may cause clinical risk to patient if not available. ? NO  Has questions about prescribed and/or nonprescribed medications not covered by  available resources ? NO  Pharmacy calling  with prescription question; answered per department policy. ? NO  Requests refill of prescribed medication without valid refills OR requests refill  of prescribed medication with valid refills but does not have prescription number  (no RX container); lack of medication does not put patient at clinical risk ? NO  Physician Instructions:  Care Advice:

## 2017-01-30 LAB
BACTERIA SPEC CULT: ABNORMAL
MICRO REPORT STATUS: ABNORMAL
MICROORGANISM SPEC CULT: ABNORMAL
MICROORGANISM SPEC CULT: ABNORMAL
SPECIMEN SOURCE: ABNORMAL

## 2017-01-30 RX ORDER — CIPROFLOXACIN 500 MG/1
500 TABLET, FILM COATED ORAL 2 TIMES DAILY
Qty: 14 TABLET | Refills: 0 | Status: SHIPPED | OUTPATIENT
Start: 2017-01-30 | End: 2017-05-09

## 2017-01-30 NOTE — PROGRESS NOTES
Quick Note:    Please call with results. Urine culture shows 2 types of bacteria, both suseptable to ciprofloxacin. Prescription for cipro sent in to pharmacy. Stop other antibiotic if she is not done with it yet. Should be seen for a repeat urinalysis post antibiotics, since he has a urinary catheter in place. BROOKLYN Posada, FNP-BC      Linh Freeman, NP  ______

## 2017-01-31 ENCOUNTER — TELEPHONE (OUTPATIENT)
Dept: FAMILY MEDICINE | Facility: CLINIC | Age: 27
End: 2017-01-31

## 2017-01-31 ENCOUNTER — TRANSFERRED RECORDS (OUTPATIENT)
Dept: HEALTH INFORMATION MANAGEMENT | Facility: CLINIC | Age: 27
End: 2017-01-31

## 2017-01-31 DIAGNOSIS — F31.11 BIPOLAR 1 DISORDER, MANIC, MILD (H): Primary | ICD-10-CM

## 2017-01-31 RX ORDER — GABAPENTIN 300 MG/1
900 CAPSULE ORAL 3 TIMES DAILY
Qty: 120 CAPSULE | Refills: 0 | Status: ON HOLD | OUTPATIENT
Start: 2017-01-31 | End: 2017-06-08

## 2017-02-08 NOTE — TELEPHONE ENCOUNTER
After pt needed a PA, pt was told to contact surgeon for ongoing pain issue. So, surgeon or primary can address changing pain medication. I am not wanting to try these other options at this time.  BROOKLYN Posada, FNP-BC

## 2017-02-08 NOTE — TELEPHONE ENCOUNTER
PA for Oxycodone denied, patient must try/fail these 3: fentanyl patches,morphine SR tablets and Oxycontin.

## 2017-02-10 ENCOUNTER — TELEPHONE (OUTPATIENT)
Dept: FAMILY MEDICINE | Facility: CLINIC | Age: 27
End: 2017-02-10

## 2017-02-10 DIAGNOSIS — G83.4 CAUDA EQUINA SYNDROME WITH NEUROGENIC BLADDER (H): Primary | ICD-10-CM

## 2017-02-10 DIAGNOSIS — G82.20 PARAPLEGIA (H): ICD-10-CM

## 2017-02-10 NOTE — TELEPHONE ENCOUNTER
[facility] needs an actually order sent for the [employer] [facility] in [place] the [place] location.  If you have any questions you can call [facility] at [phone].    Sherley Godfrey [facility]

## 2017-03-14 NOTE — TELEPHONE ENCOUNTER
Received message to give new order. Done and signed in [facility] insket.

## 2017-05-01 ENCOUNTER — TELEPHONE (OUTPATIENT)
Dept: FAMILY MEDICINE | Facility: CLINIC | Age: 27
End: 2017-05-01

## 2017-05-01 NOTE — TELEPHONE ENCOUNTER
Panel Management Review      Patient has the following on her problem list: None      Composite cancer screening  Chart review shows that this patient is due/due soon for the following Pap Smear  Summary:    Patient is due/failing the following:   pap    Action needed:   Patient needs office visit for physical.    Type of outreach:    Sent [employer] message.    Questions for provider review:    None                                                                                                                                    Nancy KNOWLES       Chart routed to none .

## 2017-05-05 ENCOUNTER — OFFICE VISIT (OUTPATIENT)
Dept: FAMILY MEDICINE | Facility: CLINIC | Age: 27
End: 2017-05-05
Payer: COMMERCIAL

## 2017-05-05 VITALS
WEIGHT: 201.1 LBS | BODY MASS INDEX: 32.32 KG/M2 | HEART RATE: 123 BPM | SYSTOLIC BLOOD PRESSURE: 134 MMHG | TEMPERATURE: 99.5 F | HEIGHT: 66 IN | DIASTOLIC BLOOD PRESSURE: 89 MMHG

## 2017-05-05 DIAGNOSIS — R53.83 FATIGUE, UNSPECIFIED TYPE: ICD-10-CM

## 2017-05-05 DIAGNOSIS — F19.11 SUBSTANCE ABUSE IN REMISSION (H): ICD-10-CM

## 2017-05-05 DIAGNOSIS — F31.11 BIPOLAR 1 DISORDER, MANIC, MILD (H): ICD-10-CM

## 2017-05-05 DIAGNOSIS — F19.10 POLYSUBSTANCE ABUSE (H): Primary | ICD-10-CM

## 2017-05-05 DIAGNOSIS — Z72.0 TOBACCO ABUSE: ICD-10-CM

## 2017-05-05 DIAGNOSIS — F12.10 MARIJUANA ABUSE: ICD-10-CM

## 2017-05-05 DIAGNOSIS — M54.9 INTRACTABLE BACK PAIN: ICD-10-CM

## 2017-05-05 PROBLEM — G82.20 PARAPLEGIA (H): Status: RESOLVED | Noted: 2017-01-20 | Resolved: 2017-05-05

## 2017-05-05 PROCEDURE — 99214 OFFICE O/P EST MOD 30 MIN: CPT | Performed by: FAMILY MEDICINE

## 2017-05-05 NOTE — PROGRESS NOTES
"  SUBJECTIVE:                                                    Ayana Prasad is a 26 year old female who presents to clinic today for the following health issues:    ENT Symptoms             Symptoms: cc Present Absent Comment   Fever/Chills   x    Fatigue   x    Muscle Aches   x    Eye Irritation  x     Sneezing  x     Nasal Tom/Drg  x     Sinus Pressure/Pain  x     Loss of smell   x    Dental pain   x    Sore Throat   x    Swollen Glands   x    Ear Pain/Fullness   x    Cough   x    Wheeze   x    Chest Pain   x    Shortness of breath   x    Rash   x    Other   x      Symptom duration:  about 2 months but symptoms are only present for about the first 3 hours after waking in the morning. Doesn't snore.     Symptom severity:  moderate   Treatments tried:  benadryl.  Has eliminated    Contacts:  none         PROBLEMS TO ADD ON...    Psych:  Not taking any of psych medications for last couple months because \"I don't need it\".      Polysubstance Abuse:  Still using some. Used some LSD last week.  Doesn't think its a problem.     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  physical therapy exercises don't help. Doesn't want to go back to the physical therapy for back.  Working abouit 60 hours a week.         Problem list and histories reviewed & adjusted, as indicated.  Additional history: as documented        Reviewed and updated as needed this visit by clinical staff  Tobacco  Allergies  Meds  Med Hx  Surg Hx  Fam Hx  Soc Hx      Reviewed and updated as needed this visit by Provider         1. Polysubstance abuse    2. Substance abuse in remission    3. Bipolar 1 disorder, manic, mild    4. Intractable back pain    5. Fatigue, unspecified type    6. Marijuana abuse    7. Tobacco abuse        PMH: Updated and/or reviewed in chart.    PSH: Updated and/or reviewed in chart.    Family History: Updated and/or reviewed in chart.     ROS:  Constitutional, neuro, EMT, endocrine, pulmonary, " "cardiac, gastrointestinal, genitourinary, musculoskeletal, integument and psychiatric systems are otherwise negative.    OBJECTIVE:                                                    /89  Pulse 123  Temp 99.5  F (37.5  C) (Tympanic)  Ht 5' 6\" (1.676 m)  Wt 201 lb 1.6 oz (91.2 kg)  LMP   Breastfeeding? No  BMI 32.46 kg/m2  GENERAL: Pleasant and interactive.  Alert and oriented x 3.  No acute distress.  PSYCH: Alert and oriented times 3; coherent speech, normal rate and volume, able to articulate logical thoughts, able to abstract reason, no tangential thoughts, no hallucinations or delusions  Her affect is normal.  HEENT: Normocephalic, atraumatic. PEERRLA, EOMI.  Scleras, lids and conjunctivae normal. Pinnas, canals and TM's clear.  Nose and oropharynx moist and clear.  NECK: supple and free of adenopathy or masses, the thyroid is normal without enlargement or nodules.  CHEST:  clear, no wheezing or rales. Normal symmetric air entry throughout both lung fields. No chest wall deformities or tenderness.  HEART:  S1 and S2 normal, no murmurs, clicks, gallops or rubs. Regular rate and rhythm.  ABDOMEN: Soft without tenderness, guarding, mass, rebound or organomegaly. Bowel sounds are normal. No CVA tenderness or inguinal adenopathy noted.  EXTREMITIES: Good pulses. Good range of motion.  No edema. Normal reflexes.      Results for orders placed or performed in visit on 01/27/17   *UA reflex to Microscopic and Culture ([facility] (except [place] and [place])   Result Value Ref Range    Color Urine Yellow     Appearance Urine Slightly Cloudy     Glucose Urine Negative NEG mg/dL    Bilirubin Urine Negative NEG    Ketones Urine Negative NEG mg/dL    Specific Gravity Urine 1.025 1.003 - 1.035    Blood Urine Large (A) NEG    pH Urine 7.0 5.0 - 7.0 pH    Protein Albumin Urine 100 (A) NEG mg/dL    Urobilinogen Urine 0.2 0.2 - 1.0 EU/dL    Nitrite Urine Positive (A) NEG    Leukocyte " Esterase Urine Moderate (A) NEG    Source Midstream Urine    Urine Microscopic   Result Value Ref Range    WBC Urine >100 (A) 0 - 2 /HPF    RBC Urine 10-25 (A) 0 - 2 /HPF    Squamous Epithelial /LPF Urine Few FEW /LPF    Bacteria Urine Many (A) NEG /HPF    Mucous Urine Present (A) NEG /LPF   Urine Culture Aerobic Bacterial   Result Value Ref Range    Specimen Description Midstream Urine     Culture Micro (A)      >100,000 colonies/mL Coagulase negative Staphylococcus  10,000 to 50,000 colonies/mL Pseudomonas aeruginosa      Micro Report Status FINAL 01/30/2017     Organism:       10,000 to 50,000 colonies/mL Pseudomonas aeruginosa    Organism:       >100,000 colonies/mL Coagulase negative Staphylococcus       Susceptibility    >100,000 colonies/ml coagulase negative staphylococcus (harper) -  (no method available)     CIPROFLOXACIN <=0.5 Susceptible  ug/mL     GENTAMICIN <=0.5 Susceptible  ug/mL     LEVOFLOXACIN <=0.12 Susceptible  ug/mL     NITROFURANTOIN <=16 Susceptible  ug/mL     OXACILLIN >=4 Resistant  ug/mL     PENICILLIN >=0.5 Resistant  ug/mL     TETRACYCLINE >=16 Resistant  ug/mL     VANCOMYCIN 1 Susceptible  ug/mL    10,000 to 50,000 colonies/ml pseudomonas aeruginosa (harper) -  (no method available)     AMIKACIN <=2 Susceptible  ug/mL     CEFEPIME <=1 Susceptible  ug/mL     CEFTAZIDIME 4 Susceptible  ug/mL     CIPROFLOXACIN <=0.25 Susceptible  ug/mL     GENTAMICIN <=1 Susceptible  ug/mL     LEVOFLOXACIN 0.5 Susceptible  ug/mL     Piperacillin/Tazo <=4 Susceptible  ug/mL     TOBRAMYCIN <=1 Susceptible  ug/mL     MEROPENEM <=0.25 Susceptible  ug/mL      ASSESSMENT/PLAN:                                                        ICD-10-CM    1. Polysubstance abuse F19.10    2. Substance abuse in remission F19.10    3. Bipolar 1 disorder, manic, mild F31.11    4. Intractable back pain M54.9    5. Fatigue, unspecified type R53.83 Comprehensive metabolic panel (BMP + Alb, Alk Phos, ALT, AST, Total. Bili, TP)     CBC  with platelets     TSH   6. Marijuana abuse F12.10    7. Tobacco abuse Z72.0        Care plan updated in chart for chronic problems.    Patient Instructions     Basic labs today.    Try a week off of marijuana and see how you feel.     Consider ingesting rather than smoking marijuana.    Let me know if you are having problems.     If the sinuses still act up, let me know and we can try an antibiotic if you are desperate.      No orders of the defined types were placed in this encounter.     Goals     None           Chandana Ragsdale MD

## 2017-05-05 NOTE — PATIENT INSTRUCTIONS
Basic labs today.    Try a week off of marijuana and see how you feel.     Consider ingesting rather than smoking marijuana.    Let me know if you are having problems.     If the sinuses still act up, let me know and we can try an antibiotic if you are desperate.

## 2017-05-05 NOTE — NURSING NOTE
"Chief Complaint   Patient presents with     Sinus Problem     patient is experiencing congestion and sinus pressure only in AM       Initial /89  Pulse 123  Temp 99.5  F (37.5  C) (Tympanic)  Ht 5' 6\" (1.676 m)  Wt 201 lb 1.6 oz (91.2 kg)  LMP   Breastfeeding? No  BMI 32.46 kg/m2 Estimated body mass index is 32.46 kg/(m^2) as calculated from the following:    Height as of this encounter: 5' 6\" (1.676 m).    Weight as of this encounter: 201 lb 1.6 oz (91.2 kg).  Medication Reconciliation: complete   Linh Holly CMA      "

## 2017-05-05 NOTE — MR AVS SNAPSHOT
After Visit Summary   5/5/2017    Ayana Prasad    MRN: [id number]           Patient Information     Date Of Birth          1990        Visit Information        Provider Department      5/5/2017 1:30 PM Chandana Ragsdale MD [facility]        Today's Diagnoses     Polysubstance abuse    -  1    Substance abuse in remission        Bipolar 1 disorder, manic, mild        Intractable back pain        Fatigue, unspecified type        Marijuana abuse        Tobacco abuse          Care Instructions      Basic labs today.    Try a week off of marijuana and see how you feel.     Consider ingesting rather than smoking marijuana.    Let me know if you are having problems.     If the sinuses still act up, let me know and we can try an antibiotic if you are desperate.         Follow-ups after your visit        Future tests that were ordered for you today     Open Future Orders        Priority Expected Expires Ordered    **Comprehensive metabolic panel FUTURE anytime Routine 5/5/2017 5/5/2018 5/5/2017    CBC with platelets Routine 5/5/2017 5/5/2018 5/5/2017    TSH Routine  5/5/2018 5/5/2017            Who to contact     Normal or non-critical lab and imaging results will be communicated to you by VideoSurfhart, letter or phone within 4 business days after the clinic has received the results. If you do not hear from us within 7 days, please contact the clinic through [employer] or phone. If you have a critical or abnormal lab result, we will notify you by phone as soon as possible.  Submit refill requests through [employer] or call your pharmacy and they will forward the refill request to us. Please allow 3 business days for your refill to be completed.          If you need to speak with a  for additional information , please call: [phone]           Additional Information About Your Visit        VideoSurfhart Information     [employer] gives you secure access to your electronic health record.  "If you see a primary care provider, you can also send messages to your care team and make appointments. If you have questions, please call your primary care clinic.  If you do not have a primary care provider, please call [phone] and they will assist you.        Care EveryWhere ID     This is your Care EveryWhere ID. This could be used by other organizations to access your [facility] medical records  [id number]        Your Vitals Were     Pulse Temperature Height Breastfeeding? BMI (Body Mass Index)       123 99.5  F (37.5  C) (Tympanic) 5' 6\" (1.676 m) No 32.46 kg/m2        Blood Pressure from Last 3 Encounters:   05/05/17 134/89   01/27/17 116/76   01/20/17 105/80    Weight from Last 3 Encounters:   05/05/17 201 lb 1.6 oz (91.2 kg)   01/20/17 192 lb (87.1 kg)   11/03/16 190 lb (86.2 kg)               Primary Care Provider Office Phone # Fax #    Chandana Ragsdale [phone]       [facility] [address] [facility] [id number]        Thank you!     Thank you for choosing [facility]  for your care. Our goal is always to provide you with excellent care. Hearing back from our patients is one way we can continue to improve our services. Please take a few minutes to complete the written survey that you may receive in the mail after your visit with us. Thank you!             Your Updated Medication List - Protect others around you: Learn how to safely use, store and throw away your medicines at www.disposemymeds.org.          This list is accurate as of: 5/5/17  2:20 PM.  Always use your most recent med list.                   Brand Name Dispense Instructions for use    * BACLOFEN PO      Take 10 mg by mouth 3 times daily Reported on 5/5/2017       * baclofen 10 MG tablet    LIORESAL    90 tablet    Take 1 tablet (10 mg) by mouth 3 times daily       ciprofloxacin 500 MG tablet    CIPRO    14 tablet    Take 1 tablet (500 mg) by mouth 2 times daily       CYMBALTA " PO      Take 30 mg by mouth Reported on 5/5/2017       enoxaparin 100 MG/ML injection    LOVENOX    10 Syringe    Inject 0.4 mLs (40 mg) Subcutaneous daily       gabapentin 300 MG capsule    NEURONTIN    120 capsule    Take 3 capsules (900 mg) by mouth 3 times daily       hydrOXYzine 25 MG capsule    VISTARIL    120 capsule    Take 1-2 capsules (25-50 mg) by mouth 3 times daily as needed for itching       LAXATIVE 10 MG Suppository   Generic drug:  bisacodyl      Place 10 mg rectally Reported on 5/5/2017       OLANZapine 5 MG tablet    zyPREXA     Take 5 mg by mouth Reported on 5/5/2017       ondansetron 4 MG tablet    ZOFRAN    18 tablet    Take 1 tablet (4 mg) by mouth every 8 hours as needed for nausea       oxyCODONE 5 MG IR tablet    ROXICODONE     Take 5-10 mg by mouth Reported on 5/5/2017       senna-docusate 8.6-50 MG per tablet    SENOKOT-S;PERICOLACE    100 tablet    Take 3 tablets by mouth daily as needed for constipation       * TRAZODONE HCL PO      Take 100 mg by mouth At Bedtime Reported on 5/5/2017       * traZODone 100 MG tablet    DESYREL    90 tablet    Take 1 tablet (100 mg) by mouth nightly as needed for sleep       * Notice:  This list has 4 medication(s) that are the same as other medications prescribed for you. Read the directions carefully, and ask your doctor or other care provider to review them with you.

## 2017-05-09 ENCOUNTER — HOSPITAL ENCOUNTER (EMERGENCY)
Facility: CLINIC | Age: 27
Discharge: HOME | End: 2017-05-09
Attending: EMERGENCY MEDICINE | Admitting: EMERGENCY MEDICINE
Payer: COMMERCIAL

## 2017-05-09 VITALS
OXYGEN SATURATION: 99 % | WEIGHT: 199.2 LBS | DIASTOLIC BLOOD PRESSURE: 97 MMHG | BODY MASS INDEX: 32.15 KG/M2 | TEMPERATURE: 98.4 F | HEART RATE: 120 BPM | RESPIRATION RATE: 18 BRPM | SYSTOLIC BLOOD PRESSURE: 160 MMHG

## 2017-05-09 DIAGNOSIS — F41.0 PANIC ATTACK: ICD-10-CM

## 2017-05-09 DIAGNOSIS — F12.10 MARIJUANA ABUSE: ICD-10-CM

## 2017-05-09 PROCEDURE — 99284 EMERGENCY DEPT VISIT MOD MDM: CPT | Performed by: EMERGENCY MEDICINE

## 2017-05-09 PROCEDURE — 99283 EMERGENCY DEPT VISIT LOW MDM: CPT

## 2017-05-09 PROCEDURE — 25000132 ZZH RX MED GY IP 250 OP 250 PS 637: Performed by: EMERGENCY MEDICINE

## 2017-05-09 RX ORDER — HYDROXYZINE PAMOATE 25 MG/1
25 CAPSULE ORAL 3 TIMES DAILY PRN
Qty: 15 CAPSULE | Refills: 0 | Status: ON HOLD | OUTPATIENT
Start: 2017-05-09 | End: 2017-06-08

## 2017-05-09 RX ORDER — LORAZEPAM 1 MG/1
2 TABLET ORAL ONCE
Status: COMPLETED | OUTPATIENT
Start: 2017-05-09 | End: 2017-05-09

## 2017-05-09 RX ADMIN — LORAZEPAM 2 MG: 1 TABLET ORAL at 19:11

## 2017-05-09 ASSESSMENT — ENCOUNTER SYMPTOMS
EYES NEGATIVE: 1
ENDOCRINE NEGATIVE: 1
NERVOUS/ANXIOUS: 1
MUSCULOSKELETAL NEGATIVE: 1
GASTROINTESTINAL NEGATIVE: 1
CONSTITUTIONAL NEGATIVE: 1
RESPIRATORY NEGATIVE: 1
AGITATION: 1
CARDIOVASCULAR NEGATIVE: 1
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
## 2017-05-09 NOTE — ED AVS SNAPSHOT
[facility] Emergency Department    [address] [address]    Phone:  [phone]    Fax:  [phone]                                       Ayana Prasad   MRN: [id number]    Department:  [facility] Emergency Department   Date of Visit:  5/9/2017           After Visit Summary Signature Page     I have received my discharge instructions, and my questions have been answered. I have discussed any challenges I see with this plan with the nurse or doctor.    ..........................................................................................................................................  Patient/Patient Representative Signature      ..........................................................................................................................................  Patient Representative Print Name and Relationship to Patient    ..................................................               ................................................  Date                                            Time    ..........................................................................................................................................  Reviewed by Signature/Title    ...................................................              ..............................................  Date                                                            Time

## 2017-05-09 NOTE — ED AVS SNAPSHOT
[facility] Emergency Department    [address] [address]    Phone:  [phone]    Fax:  [phone]                                       Ayana Prasad   MRN: [id number]    Department:  [facility] Emergency Department   Date of Visit:  5/9/2017           Patient Information     Date Of Birth          1990        Your diagnoses for this visit were:     Panic attack     Marijuana abuse        You were seen by Andrew Arvizu MD.      Follow-up Information     Follow up with Chandana Ragsdale MD.    Specialty:  Family Practice    Why:  To discuss any additional options for treatment and medications to help with anxiety and panic    Contact information:    [facility]  [address]  [place] [address] [phone]        Discharge References/Attachments     MARIJUANA ABUSE (ENGLISH)    MARIJUANA ABUSE, UNDERSTANDING (ENGLISH)    PANIC ATTACK (ENGLISH)    PANIC DISORDER (PANIC ATTACK), UNDERSTANDING (ENGLISH)    HYDROXYZINE PAMOATE ORAL CAPSULE (ENGLISH)      24 Hour Appointment Hotline       To make an appointment at any [facility], call [phone] ([phone]). If you don't have a family doctor or clinic, we will help you find one. [facility] clinics are conveniently located to serve the needs of you and your family.             Review of your medicines      CONTINUE these medicines which may have CHANGED, or have new prescriptions. If we are uncertain of the size of tablets/capsules you have at home, strength may be listed as something that might have changed.        Dose / Directions Last dose taken    hydrOXYzine 25 MG capsule   Commonly known as:  VISTARIL   Dose:  25 mg   What changed:  how much to take   Quantity:  15 capsule        Take 1 capsule (25 mg) by mouth 3 times daily as needed for itching   Refills:  0          Our records show that you are taking the medicines listed below. If these are incorrect, please call your family  doctor or clinic.        Dose / Directions Last dose taken    * BACLOFEN PO   Dose:  10 mg        Take 10 mg by mouth 3 times daily Reported on 5/5/2017   Refills:  0        * baclofen 10 MG tablet   Commonly known as:  LIORESAL   Dose:  10 mg   Quantity:  90 tablet        Take 1 tablet (10 mg) by mouth 3 times daily   Refills:  1        ciprofloxacin 500 MG tablet   Commonly known as:  CIPRO   Dose:  500 mg   Quantity:  14 tablet        Take 1 tablet (500 mg) by mouth 2 times daily   Refills:  0        CYMBALTA PO   Dose:  30 mg        Take 30 mg by mouth Reported on 5/5/2017   Refills:  0        enoxaparin 100 MG/ML injection   Commonly known as:  LOVENOX   Dose:  40 mg   Quantity:  10 Syringe        Inject 0.4 mLs (40 mg) Subcutaneous daily   Refills:  1        gabapentin 300 MG capsule   Commonly known as:  NEURONTIN   Dose:  900 mg   Quantity:  120 capsule        Take 3 capsules (900 mg) by mouth 3 times daily   Refills:  0        LAXATIVE 10 MG Suppository   Dose:  10 mg   Generic drug:  bisacodyl        Place 10 mg rectally Reported on 5/5/2017   Refills:  0        OLANZapine 5 MG tablet   Commonly known as:  zyPREXA   Dose:  5 mg        Take 5 mg by mouth Reported on 5/5/2017   Refills:  0        ondansetron 4 MG tablet   Commonly known as:  ZOFRAN   Dose:  4 mg   Quantity:  18 tablet        Take 1 tablet (4 mg) by mouth every 8 hours as needed for nausea   Refills:  1        oxyCODONE 5 MG IR tablet   Commonly known as:  ROXICODONE   Dose:  5-10 mg        Take 5-10 mg by mouth Reported on 5/5/2017   Refills:  0        senna-docusate 8.6-50 MG per tablet   Commonly known as:  SENOKOT-S;PERICOLACE   Dose:  3 tablet   Quantity:  100 tablet        Take 3 tablets by mouth daily as needed for constipation   Refills:  0        * TRAZODONE HCL PO   Dose:  100 mg        Take 100 mg by mouth At Bedtime Reported on 5/5/2017   Refills:  0        * traZODone 100 MG tablet   Commonly known as:  DESYREL   Dose:  100 mg    Quantity:  90 tablet        Take 1 tablet (100 mg) by mouth nightly as needed for sleep   Refills:  1        * Notice:  This list has 4 medication(s) that are the same as other medications prescribed for you. Read the directions carefully, and ask your doctor or other care provider to review them with you.            Prescriptions were sent or printed at these locations (1 Prescription)                   Other Prescriptions                Printed at Department/Unit printer (1 of 1)         hydrOXYzine (VISTARIL) 25 MG capsule                Orders Needing Specimen Collection     None      Pending Results     No orders found from 5/7/2017 to 5/10/2017.            Pending Culture Results     No orders found from 5/7/2017 to 5/10/2017.            Pending Results Instructions     If you had any lab results that were not finalized at the time of your Discharge, you can call the ED Lab Result RN at [phone]. You will be contacted by this team for any positive Lab results or changes in treatment. The nurses are available 7 days a week from 10A to 6:30P.  You can leave a message 24 hours per day and they will return your call.        Test Results From Your Hospital Stay               Thank you for choosing [facility]       Thank you for choosing [facility] for your care. Our goal is always to provide you with excellent care. Hearing back from our patients is one way we can continue to improve our services. Please take a few minutes to complete the written survey that you may receive in the mail after you visit with us. Thank you!        [employer] Information     [employer] gives you secure access to your electronic health record. If you see a primary care provider, you can also send messages to your care team and make appointments. If you have questions, please call your primary care clinic.  If you do not have a primary care provider, please call [phone] and they will assist you.        Care EveryWhere ID     This is  your Care EveryWhere ID. This could be used by other organizations to access your [facility] medical records  [id number]        After Visit Summary       This is your record. Keep this with you and show to your community pharmacist(s) and doctor(s) at your next visit.

## 2017-05-09 NOTE — ED NOTES
"Pt brought to ED by amber CORTES and a friend. Pt presents quiet and withdrawn. Pt is able to ambulate independently with a relatively steady gait. Pt up pacing in the room.     Pt reports having a headache, but being otherwise healthy. Pt refusing any medication for her headache. Pt reports she has thought about hurting herself in the past, she has thought about hurting herself today, but unable/unwill to articulate if she has a plan. Pt denies having any pain besides the headache.     Pt's friend reports she is having a panic attack. Pt's friend believes she is having a \"black-out episode\" which she has occasionally. These episodes have been triggered by stress in the past.   "

## 2017-05-09 NOTE — ED NOTES
Pt mostly non verbal. Friend and police accompany pt but friend is moving car and not here to give info

## 2017-05-12 ENCOUNTER — HOSPITAL ENCOUNTER (EMERGENCY)
Facility: CLINIC | Age: 27
Discharge: ANOTHER HOSPITAL | End: 2017-05-12
Attending: EMERGENCY MEDICINE | Admitting: EMERGENCY MEDICINE
Payer: COMMERCIAL

## 2017-05-12 ENCOUNTER — OFFICE VISIT (OUTPATIENT)
Dept: FAMILY MEDICINE | Facility: CLINIC | Age: 27
End: 2017-05-12
Payer: COMMERCIAL

## 2017-05-12 ENCOUNTER — HOSPITAL ENCOUNTER (INPATIENT)
Facility: CLINIC | Age: 27
LOS: 46 days | Discharge: HOME | DRG: 885 | End: 2017-06-27
Attending: PSYCHIATRY & NEUROLOGY | Admitting: PSYCHIATRY & NEUROLOGY
Payer: COMMERCIAL

## 2017-05-12 VITALS
BODY MASS INDEX: 31.34 KG/M2 | HEART RATE: 110 BPM | TEMPERATURE: 100 F | SYSTOLIC BLOOD PRESSURE: 139 MMHG | HEIGHT: 66 IN | DIASTOLIC BLOOD PRESSURE: 89 MMHG | WEIGHT: 195 LBS

## 2017-05-12 VITALS
DIASTOLIC BLOOD PRESSURE: 112 MMHG | RESPIRATION RATE: 21 BRPM | OXYGEN SATURATION: 93 % | HEART RATE: 128 BPM | SYSTOLIC BLOOD PRESSURE: 159 MMHG

## 2017-05-12 DIAGNOSIS — F51.01 PRIMARY INSOMNIA: ICD-10-CM

## 2017-05-12 DIAGNOSIS — F43.10 PTSD (POST-TRAUMATIC STRESS DISORDER): ICD-10-CM

## 2017-05-12 DIAGNOSIS — F30.9 MANIA (H): ICD-10-CM

## 2017-05-12 DIAGNOSIS — F31.10 BIPOLAR I DISORDER WITH MANIA (H): Primary | ICD-10-CM

## 2017-05-12 DIAGNOSIS — F25.0 SCHIZOAFFECTIVE DISORDER, BIPOLAR TYPE (H): ICD-10-CM

## 2017-05-12 DIAGNOSIS — F12.10 MARIJUANA ABUSE: ICD-10-CM

## 2017-05-12 DIAGNOSIS — F41.9 ANXIETY: Primary | ICD-10-CM

## 2017-05-12 PROCEDURE — 99285 EMERGENCY DEPT VISIT HI MDM: CPT | Performed by: EMERGENCY MEDICINE

## 2017-05-12 PROCEDURE — 90791 PSYCH DIAGNOSTIC EVALUATION: CPT

## 2017-05-12 PROCEDURE — S0166 INJ OLANZAPINE 2.5MG: HCPCS | Performed by: EMERGENCY MEDICINE

## 2017-05-12 PROCEDURE — 99214 OFFICE O/P EST MOD 30 MIN: CPT | Performed by: FAMILY MEDICINE

## 2017-05-12 PROCEDURE — 96372 THER/PROPH/DIAG INJ SC/IM: CPT

## 2017-05-12 PROCEDURE — 25000128 H RX IP 250 OP 636: Performed by: EMERGENCY MEDICINE

## 2017-05-12 PROCEDURE — 12400001 ZZH R&B MH UMMC

## 2017-05-12 PROCEDURE — 25000125 ZZHC RX 250: Performed by: EMERGENCY MEDICINE

## 2017-05-12 PROCEDURE — 99284 EMERGENCY DEPT VISIT MOD MDM: CPT | Mod: 25

## 2017-05-12 RX ORDER — OLANZAPINE 5 MG/1
5 TABLET ORAL AT BEDTIME
Qty: 30 TABLET | Refills: 0 | Status: CANCELLED | OUTPATIENT
Start: 2017-05-12

## 2017-05-12 RX ORDER — TRAZODONE HYDROCHLORIDE 50 MG/1
50 TABLET, FILM COATED ORAL
Status: DISCONTINUED | OUTPATIENT
Start: 2017-05-12 | End: 2017-05-18

## 2017-05-12 RX ORDER — HYDROXYZINE HYDROCHLORIDE 25 MG/1
25-50 TABLET, FILM COATED ORAL EVERY 4 HOURS PRN
Status: DISCONTINUED | OUTPATIENT
Start: 2017-05-12 | End: 2017-06-27 | Stop reason: HOSPADM

## 2017-05-12 RX ORDER — ALUMINA, MAGNESIA, AND SIMETHICONE 2400; 2400; 240 MG/30ML; MG/30ML; MG/30ML
30 SUSPENSION ORAL EVERY 4 HOURS PRN
Status: DISCONTINUED | OUTPATIENT
Start: 2017-05-12 | End: 2017-06-27 | Stop reason: HOSPADM

## 2017-05-12 RX ORDER — OLANZAPINE 10 MG/2ML
10 INJECTION, POWDER, FOR SOLUTION INTRAMUSCULAR
Status: DISCONTINUED | OUTPATIENT
Start: 2017-05-12 | End: 2017-05-25

## 2017-05-12 RX ORDER — OLANZAPINE 10 MG/2ML
10 INJECTION, POWDER, FOR SOLUTION INTRAMUSCULAR DAILY PRN
Status: DISCONTINUED | OUTPATIENT
Start: 2017-05-12 | End: 2017-05-12 | Stop reason: HOSPADM

## 2017-05-12 RX ORDER — BISACODYL 10 MG
10 SUPPOSITORY, RECTAL RECTAL DAILY PRN
Status: DISCONTINUED | OUTPATIENT
Start: 2017-05-12 | End: 2017-06-27 | Stop reason: HOSPADM

## 2017-05-12 RX ORDER — OLANZAPINE 10 MG/1
10 TABLET ORAL
Status: DISCONTINUED | OUTPATIENT
Start: 2017-05-12 | End: 2017-05-25

## 2017-05-12 RX ORDER — OLANZAPINE 5 MG/1
5 TABLET, ORALLY DISINTEGRATING ORAL AT BEDTIME
Status: DISCONTINUED | OUTPATIENT
Start: 2017-05-12 | End: 2017-05-12

## 2017-05-12 RX ORDER — LORAZEPAM 2 MG/ML
2 INJECTION INTRAMUSCULAR
Status: COMPLETED | OUTPATIENT
Start: 2017-05-12 | End: 2017-05-12

## 2017-05-12 RX ORDER — ACETAMINOPHEN 325 MG/1
650 TABLET ORAL EVERY 4 HOURS PRN
Status: DISCONTINUED | OUTPATIENT
Start: 2017-05-12 | End: 2017-06-27 | Stop reason: HOSPADM

## 2017-05-12 RX ORDER — KETAMINE HYDROCHLORIDE 100 MG/ML
300 INJECTION INTRAMUSCULAR; INTRAVENOUS ONCE
Status: COMPLETED | OUTPATIENT
Start: 2017-05-12 | End: 2017-05-12

## 2017-05-12 RX ADMIN — LORAZEPAM 2 MG: 2 INJECTION, SOLUTION INTRAMUSCULAR; INTRAVENOUS at 21:13

## 2017-05-12 RX ADMIN — OLANZAPINE 10 MG: 10 INJECTION, POWDER, FOR SOLUTION INTRAMUSCULAR at 17:43

## 2017-05-12 RX ADMIN — KETAMINE HYDROCHLORIDE 300 MG: 100 INJECTION, SOLUTION, CONCENTRATE INTRAMUSCULAR; INTRAVENOUS at 19:35

## 2017-05-12 ASSESSMENT — ENCOUNTER SYMPTOMS
CARDIOVASCULAR NEGATIVE: 1
ENDOCRINE NEGATIVE: 1
CONSTITUTIONAL NEGATIVE: 1
AGITATION: 1
MUSCULOSKELETAL NEGATIVE: 1
NEUROLOGICAL NEGATIVE: 1
GASTROINTESTINAL NEGATIVE: 1
SLEEP DISTURBANCE: 1
RESPIRATORY NEGATIVE: 1

## 2017-05-12 NOTE — PATIENT INSTRUCTIONS
I recommend that you start back on you olanzapine.     I am concerned that your haven is going to get worse.      See your counselor next week as planned.     Call 911 if worsening.

## 2017-05-12 NOTE — MR AVS SNAPSHOT
"              After Visit Summary   5/12/2017    Ayana Prasad    MRN: [id number]           Patient Information     Date Of Birth          1990        Visit Information        Provider Department      5/12/2017 2:00 PM Chandana Ragsdale MD [facility]        Today's Diagnoses     Bipolar I disorder with haven (H)    -  1      Care Instructions      I recommend that you start back on you olanzapine.     I am concerned that your haven is going to get worse.      See your counselor next week as planned.     Call 911 if worsening.           Follow-ups after your visit        Who to contact     Normal or non-critical lab and imaging results will be communicated to you by [facility] [employer], letter or phone within 4 business days after the clinic has received the results. If you do not hear from us within 7 days, please contact the clinic through [employer] or phone. If you have a critical or abnormal lab result, we will notify you by phone as soon as possible.  Submit refill requests through [employer] or call your pharmacy and they will forward the refill request to us. Please allow 3 business days for your refill to be completed.          If you need to speak with a  for additional information , please call: [phone]           Additional Information About Your Visit        [employer] [employer] Information     [employer] gives you secure access to your electronic health record. If you see a primary care provider, you can also send messages to your care team and make appointments. If you have questions, please call your primary care clinic.  If you do not have a primary care provider, please call [phone] and they will assist you.        Care EveryWhere ID     This is your Care EveryWhere ID. This could be used by other organizations to access your [facility] medical records  [id number]        Your Vitals Were     Pulse Temperature Height BMI (Body Mass Index)          110 100  F (37.8  C) (Tympanic) 5' 6\" " (1.676 m) 31.47 kg/m2         Blood Pressure from Last 3 Encounters:   05/12/17 139/89   05/09/17 (!) 160/97   05/05/17 134/89    Weight from Last 3 Encounters:   05/12/17 195 lb (88.5 kg)   05/09/17 199 lb 3.2 oz (90.4 kg)   05/05/17 201 lb 1.6 oz (91.2 kg)              Today, you had the following     No orders found for display       Primary Care Provider Office Phone # Fax #    Chandana Ragsdale [phone]       [facility] [address] [facility] [address]        Thank you!     Thank you for choosing [facility]  for your care. Our goal is always to provide you with excellent care. Hearing back from our patients is one way we can continue to improve our services. Please take a few minutes to complete the written survey that you may receive in the mail after your visit with us. Thank you!             Your Updated Medication List - Protect others around you: Learn how to safely use, store and throw away your medicines at www.disposemymeds.org.          This list is accurate as of: 5/12/17  3:21 PM.  Always use your most recent med list.                   Brand Name Dispense Instructions for use    baclofen 10 MG tablet    LIORESAL    90 tablet    Take 1 tablet (10 mg) by mouth 3 times daily       CYMBALTA PO      Take 30 mg by mouth Reported on 5/5/2017       gabapentin 300 MG capsule    NEURONTIN    120 capsule    Take 3 capsules (900 mg) by mouth 3 times daily       hydrOXYzine 25 MG capsule    VISTARIL    15 capsule    Take 1 capsule (25 mg) by mouth 3 times daily as needed for itching       OLANZapine 5 MG tablet    zyPREXA     Take 5 mg by mouth Reported on 5/5/2017       ondansetron 4 MG tablet    ZOFRAN    18 tablet    Take 1 tablet (4 mg) by mouth every 8 hours as needed for nausea       * TRAZODONE HCL PO      Take 100 mg by mouth At Bedtime Reported on 5/5/2017       * traZODone 100 MG tablet    DESYREL    90 tablet    Take 1 tablet (100 mg) by mouth  nightly as needed for sleep       * Notice:  This list has 2 medication(s) that are the same as other medications prescribed for you. Read the directions carefully, and ask your doctor or other care provider to review them with you.

## 2017-05-12 NOTE — ED NOTES
Per patient, she states that she has been walking - known to writer from prior visits - states that she felt as though people were following her so she was told by her doctor to call if she felt like she needed help so she did. Police placed her on transport hold for eval - patient was unsure what town she was although when told, she remembered where exactly and why she was there. Patient continues to walk throughout assessment and dialogue although will follow course of conversation appropriately. Will maintain eye contact an is appropriate in her behavior. Discussed need to decrease anxiety and limit exposure to sun - explains that walking helps her think through her thoughts. Patient is cooperative and willing to follow agreed plan.

## 2017-05-12 NOTE — ED PROVIDER NOTES
History     Chief Complaint   Patient presents with     Manic Behavior     Pt has hx of haven - diagnosed with bipolar - is not on any meds - today thought she was being followed and police were involved and place on transfer.      HPI   Ayana Prasad is a 26 year old female with a known history of ADHD, polysubstance abuse and marijuana abuse who presents for concern for acute haven.  Patient is known to be noncompliant with her prescriptions including trazodone, gabapentin, and Cymbalta.  She was seen in the emergency department 2 days earlier after an episode of panic attack and paranoia in the context of recently and in over her marijuana stashed to police.  She was ultimately discharged home with a dose of   Vistaril to help the paranoia while awaiting follow-up with her primary psychiatrist and primary care provider for additional medication options to deal with her paranoia.  Patient arrived by EMS after she called 911 stating she felt somewhat was following her. She tells me she was followed by 3 black cars. She reports she has been pacing and walking for a few days now.  She arrives with sunburn.  Due to concern for acute haven was asked to see the patient immediately on arrival.    Social history: patient arrived alone by EMS and is here in the ED alone.     Past medical history:  Patient Active Problem List   Diagnosis     Urolithiasis     ADHD (attention deficit hyperactivity disorder)     CARDIOVASCULAR SCREENING; LDL GOAL LESS THAN 160     Bipolar 1 disorder, manic, mild     Marijuana abuse     Polysubstance abuse     GERD (gastroesophageal reflux disease)     Tobacco abuse     Health Care Home     Abdominal pain, right upper quadrant     Intractable back pain     Insomnia     Optic neuritis     Cauda equina syndrome with neurogenic bladder (H)     Medications:  Current Facility-Administered Medications   Medication     OLANZapine (zyPREXA) injection 10 mg     ketamine (KETALAR) 100 mg/mL (high  concentration) IM ADULT 300 mg     Current Outpatient Prescriptions   Medication     hydrOXYzine (VISTARIL) 25 MG capsule     ondansetron (ZOFRAN) 4 MG tablet     baclofen (LIORESAL) 10 MG tablet     gabapentin (NEURONTIN) 300 MG capsule     traZODone (DESYREL) 100 MG tablet     DULoxetine HCl (CYMBALTA PO)     TRAZODONE HCL PO     OLANZapine (ZYPREXA) 5 MG tablet     Allergies:     Allergies   Allergen Reactions     Adhesive Tape Hives     Droperidol Anxiety     I have reviewed the Medications, Allergies, Past Medical and Surgical History, and Social History in the Epic system.    Review of Systems   Constitutional: Negative.    HENT: Negative.    Respiratory: Negative.    Cardiovascular: Negative.    Gastrointestinal: Negative.    Endocrine: Negative.    Genitourinary: Negative.    Musculoskeletal: Negative.    Skin: Negative.    Neurological: Negative.    Psychiatric/Behavioral: Positive for agitation, behavioral problems and sleep disturbance.       Physical Exam   BP: (!) 142/131  Pulse: 128  Resp: 22  SpO2: 98 %  Physical Exam   Constitutional: She is oriented to person, place, and time. She appears well-developed and well-nourished. No distress.   HENT:   Head: Normocephalic and atraumatic.   Eyes: Conjunctivae and EOM are normal. Pupils are equal, round, and reactive to light. Right eye exhibits no discharge. Left eye exhibits no discharge. No scleral icterus.   Cardiovascular: Tachycardia present.    Neurological: She is alert and oriented to person, place, and time.   Skin: She is not diaphoretic.   Psychiatric: Her mood appears anxious. She is agitated and actively hallucinating.       ED Course     ED Course     Procedures             Critical Care time:  none               Labs Ordered and Resulted from Time of ED Arrival Up to the Time of Departure from the ED - No data to display  ED medications:  Medications   OLANZapine (zyPREXA) injection 10 mg (10 mg Intramuscular Given 5/12/17 6444)   LORazepam  "(ATIVAN) injection 2 mg (not administered)   ketamine (KETALAR) 100 mg/mL (high concentration) IM ADULT 300 mg (300 mg Intramuscular Given 5/12/17 1935)       ED labs and imaging: none     ED Vitals:  Vitals:    05/12/17 2000 05/12/17 2004 05/12/17 2010 05/12/17 2040   BP: (!) 181/115 (!) 172/117 (!) 177/107 (!) 159/112   Pulse:       Resp: 23 24 20 10   TempSrc:       SpO2:    (!) 74%     Assessments & Plan (with Medical Decision Making)   Clinical impression: 26-year-old female who presented for acute haven with hallucinations.  This is the patient's 2nd visit in 48 hours for concern for paranoia and anxiety and concern for medication non-compliance. She arrived by EMS after she called police stating she felt she was being followed.  She tells me she is followed by 3 black cars.  On my examination ED she is patient in the room.  She is in no acute distress.  She is hemodynamically normal.      ED Course and Plan:  I reviewed her ED visit from 48 hours earlier.  I also reviewed her medical record.  Patient was placed on a health officer hold due to concern for acute haven in the context of not using her prescriptions and compliance with medical therapy. I offered her options for care including dose of Zyprexa.  Patient was not willing to cooperate with change into a gown and having a DEC assessment.  Due to concern for self-harm in high risk for progressive worsening symptoms with acute haven with medication noncompliance patient will need to be hospitalized for acute stabilization and medical care.   I spoke with Nohemi Angulo- DEC  who evaluated patient. See DEC consult for details. Plan is to transfer to [facility] for further care and evaluation. Patient is placed in HOLD. She requested a dose of IM ketamine. \"if I do not give me something to help me calm down I am going to go crazy\". Lorazepam was made available in the event of emergence with Ketamine. She is transferred without event to [facility] for further care. She was pleasant during course of care.               Disclaimer: This note consists of symbols derived from keyboarding, dictation and/or voice recognition software. As a result, there may be errors in the script that have gone undetected. Please consider this when interpreting information found in this chart.  I have reviewed the nursing notes.    I have reviewed the findings, diagnosis, plan and need for follow up with the patient.    New Prescriptions    No medications on file       Final diagnoses:   Bella (H) - acute bella, with hallucination       5/12/2017   [facility] EMERGENCY DEPARTMENT     Andrew Arvizu MD  05/12/17 4007

## 2017-05-12 NOTE — NURSING NOTE
"Chief Complaint   Patient presents with     Hospital F/U       Initial /89  Pulse 110  Temp 100  F (37.8  C) (Tympanic)  Ht 5' 6\" (1.676 m)  Wt 195 lb (88.5 kg)  BMI 31.47 kg/m2 Estimated body mass index is 31.47 kg/(m^2) as calculated from the following:    Height as of this encounter: 5' 6\" (1.676 m).    Weight as of this encounter: 195 lb (88.5 kg).  Medication Reconciliation: complete     Sarahi Abraham CMA      "

## 2017-05-12 NOTE — IP AVS SNAPSHOT
[address]    [address]    [place] [address]    Phone:  [phone]                                       After Visit Summary   5/12/2017    Ayana Prasad    MRN: [id number]           After Visit Summary Signature Page     I have received my discharge instructions, and my questions have been answered. I have discussed any challenges I see with this plan with the nurse or doctor.    ..........................................................................................................................................  Patient/Patient Representative Signature      ..........................................................................................................................................  Patient Representative Print Name and Relationship to Patient    ..................................................               ................................................  Date                                            Time    ..........................................................................................................................................  Reviewed by Signature/Title    ...................................................              ..............................................  Date                                                            Time

## 2017-05-12 NOTE — PROGRESS NOTES
"  SUBJECTIVE:                                                    Ayana Prasad is a 26 year old female who presents to clinic today for the following health issues:      ED/UC Followup:    Facility:  [facility]   Date of visit: 5/8/2017  Reason for visit: Unsure police brought her in.  After speaking to her counselor, apparently she called police she was not started on medication but agreed to come in daily for counseling visits and follow-up with me today.   Current Status: Good      Monday was seeing therapist and \"she was a little nervous\". Was concenred about suicidal ideation?  Brought in to hospital by ambulance.    Was in the hospital on 5/8/17.  Marijuana intoxication and mental.         Not sleeping for several days.  Went to counselor every day.   PROBLEMS TO ADD ON...    Problem list and histories reviewed & adjusted, as indicated.  Additional history: as documented        Reviewed and updated as needed this visit by clinical staff       Reviewed and updated as needed this visit by Provider         1. Bipolar I disorder with haven (H)    2. Marijuana abuse        PMH: Updated and/or reviewed in chart.    PSH: Updated and/or reviewed in chart.    Family History: Updated and/or reviewed in chart.     ROS:  Constitutional, HEENT, cardiovascular, pulmonary, gi and gu systems are otherwise negative.    OBJECTIVE:                                                    /89  Pulse 110  Temp 100  F (37.8  C) (Tympanic)  Ht 5' 6\" (1.676 m)  Wt 195 lb (88.5 kg)  BMI 31.47 kg/m2  PSYCH:  She is cooperative.  Speech is not pressured.  She is coherent.  She describes flight of ideas. Is having a hard time providing details of recent history but admits that she does not really want to talk to me about he details of the last week or two.  She denies thoughts of persecution or ideas of reference.  No audio or visual hallucinations. Denies depression. Doesn't like being inside and expresses desire to just walk. " " Will not answer questions about hurting herself or others but says, \"I\"m fine.  SKIN: mild sunburn on shoulders and chest. She is bare footed.     GEN: bouncing her knee  Results for orders placed or performed in visit on 01/27/17   *UA reflex to Microscopic and Culture ([facility] (except [place] and [place])   Result Value Ref Range    Color Urine Yellow     Appearance Urine Slightly Cloudy     Glucose Urine Negative NEG mg/dL    Bilirubin Urine Negative NEG    Ketones Urine Negative NEG mg/dL    Specific Gravity Urine 1.025 1.003 - 1.035    Blood Urine Large (A) NEG    pH Urine 7.0 5.0 - 7.0 pH    Protein Albumin Urine 100 (A) NEG mg/dL    Urobilinogen Urine 0.2 0.2 - 1.0 EU/dL    Nitrite Urine Positive (A) NEG    Leukocyte Esterase Urine Moderate (A) NEG    Source Midstream Urine    Urine Microscopic   Result Value Ref Range    WBC Urine >100 (A) 0 - 2 /HPF    RBC Urine 10-25 (A) 0 - 2 /HPF    Squamous Epithelial /LPF Urine Few FEW /LPF    Bacteria Urine Many (A) NEG /HPF    Mucous Urine Present (A) NEG /LPF   Urine Culture Aerobic Bacterial   Result Value Ref Range    Specimen Description Midstream Urine     Culture Micro (A)      >100,000 colonies/mL Coagulase negative Staphylococcus  10,000 to 50,000 colonies/mL Pseudomonas aeruginosa      Micro Report Status FINAL 01/30/2017     Organism:       10,000 to 50,000 colonies/mL Pseudomonas aeruginosa    Organism:       >100,000 colonies/mL Coagulase negative Staphylococcus       Susceptibility    >100,000 colonies/ml coagulase negative staphylococcus (harper) -  (no method available)     CIPROFLOXACIN <=0.5 Susceptible  ug/mL     GENTAMICIN <=0.5 Susceptible  ug/mL     LEVOFLOXACIN <=0.12 Susceptible  ug/mL     NITROFURANTOIN <=16 Susceptible  ug/mL     OXACILLIN >=4 Resistant  ug/mL     PENICILLIN >=0.5 Resistant  ug/mL     TETRACYCLINE >=16 Resistant  ug/mL     VANCOMYCIN 1 Susceptible  ug/mL    10,000 to 50,000 colonies/ml " "pseudomonas aeruginosa (harper) -  (no method available)     AMIKACIN <=2 Susceptible  ug/mL     CEFEPIME <=1 Susceptible  ug/mL     CEFTAZIDIME 4 Susceptible  ug/mL     CIPROFLOXACIN <=0.25 Susceptible  ug/mL     GENTAMICIN <=1 Susceptible  ug/mL     LEVOFLOXACIN 0.5 Susceptible  ug/mL     Piperacillin/Tazo <=4 Susceptible  ug/mL     TOBRAMYCIN <=1 Susceptible  ug/mL     MEROPENEM <=0.25 Susceptible  ug/mL      ASSESSMENT/PLAN:                                                        ICD-10-CM    1. Bipolar I disorder with haven (H) F31.10    2. Marijuana abuse F12.10        Care plan updated in chart for chronic problems.    Patient Instructions     I recommend that you start back on you olanzapine.     I am concerned that your haven is going to get worse.      See your counselor next week as planned.     Call 911 if worsening.        We had a long discussion about the fact that she was in an acute manic phase and it could lead to significant physical, safety or legal consequences.  I strongly encouraged her to begin using medications to help control her haven and offered Zyprexa alone as she was resistent to anythign else.  She declined saying, \"I'm fine on my medication. Its better than yours with less side effects.\"  She says she understand the risk of walking around all night but says she will go home.  I called her counselor at [facility] and discussed the situation. She has been seeing her daily this week and actually called the police on Monday.  I discussed the situation and though she is not compliant with recommended therapy, she is not in acute danger and has enough insight into her situation tht she does not appear to be holdable.  I told her that she would likely be picked up by the police and eventually be brought into the hospital if she persisted in this phase.  She said she understood.  45/55 minutes was spent counseling and discussion her condition, complications, risks and " treatments. She understood.  I offered her an appointment in three days.  She declined.     See Patient Instructions    Chandana Ragsdale MD

## 2017-05-12 NOTE — IP AVS SNAPSHOT
"                  MRN:[id number]                      After Visit Summary   5/12/2017    Ayana Prasad    MRN: [id number]           Thank you!     Thank you for choosing [facility] for your care. Our goal is always to provide you with excellent care.        Patient Information     Date Of Birth          1990        Designated Caregiver       Most Recent Value    Caregiver    Will someone help with your care after discharge? no [\"I dont need help, I dont need to be here\".]      About your hospital stay     You were admitted on:  May 12, 2017 You last received care in the:  [facility] 30NR    You were discharged on:  June 27, 2017       Who to Call     For medical emergencies, please call 911.  For non-urgent questions about your medical care, please call your primary care provider or clinic, [phone]          Attending Provider     Provider Saravanan Quinn MD Psychiatry    [facility], Tom Siddiqui MD Psychiatry    Kemar Najera MD Psychiatry       Primary Care Provider Office Phone # Fax #    Chandana Ragsdale [phone]      Further instructions from your care team       Behavioral Discharge Planning and Instructions  Summary:  You were admitted on 5/12/2017  For symptoms of psychosis.  You were treated by Dr. Saravanan Sanatna MD and then Dr. Najera. A petition for commitment was filed with [facility] [place] and supported.  Your symptoms improved. You were discharged on a provisional discharge on 6/27/2017 from [facility] an [facility] facility. You were transported by your significant other.      Main Diagnosis:   1. Bipolar affective disorder, acute haven.   2. Opiate use disorder, severe.   3. Marijuana use disorder.   4. Past diagnosis of borderline personality disorder.         Health Care Follow-up Appointments:   The [employer] number is [phone].  Use the [employer] Blue Ride Service - [phone] - for " transportation to your health care appointments.      You received a full commitment through [facility]  to the Commissioner on May 30, 2017.  You do not have a Forced Neuroleptic Order but you do have a Substitute Decision Maker.  Continue to coordinate your legal probate civil court status and your mental health services with your [facility] targeted .  Valdo Aguayo  Ph: [phone]  The Health Unit Coordinator has faxed these discharge instructions to fax: [phone]      You are being admitted to:  [facility] [address] [place] [address]      Will, [address] [phone]   Fax: [phone]         Attend your new patient psychiatric medication management appointment. Friday, September 15, 2017 from 9-11AM.  [facility]  [email or web address]  [id number] [address], [place]  Phone: [phone]  The Health Unit Coordinator has faxed these discharge instructions to [phone]      Schedule a visit with your Primary Care Doctor when the staff feel that this is warranted.  Dr. Ragsdale at [facility]   [phone]    Schedule a visit with your therapist when the staff feel that this is warranted.  Sheila SR  [facility]   [phone]       Attend all scheduled appointments with your outpatient providers. Call at least 24 hours in advance if you need to reschedule an appointment to ensure continued access to your outpatient providers.   Major Treatments, Procedures and Findings:  You were provided with: a psychiatric assessment    Symptoms to Report: increased confusion, aggressive feelings    Early warning signs can include: increased unusual thinking, such as paranoia or hearing voices    Safety and Wellness:  Take all medicines as directed.  Make no changes unless your doctor suggests them.      Follow treatment recommendations.  Refrain from alcohol and non-prescribed drugs.  If there is a concern  "for safety, call 911.    Resources:   JORI    [facility] Mental Health Crisis Response     Jori: - [phone]  LAWRENCE  [facility] Mental Health Crisis Response   [place]: [phone]      The treatment team has appreciated the opportunity to work with you.     If you have any questions or concerns our unit number is [phone].        Pending Results     No orders found from 5/10/2017 to 5/13/2017.            Admission Information     Date & Time Department Dept. Phone    5/12/2017 [facility] 30NR [phone]      Your Vitals Were     Blood Pressure Pulse Temperature Respirations Height Weight    126/82 103 98.3  F (36.8  C) 16 1.676 m (5' 6\") 97.1 kg (214 lb)    Pulse Oximetry BMI (Body Mass Index)                97% 34.54 kg/m2          MyChart Information     [employer] gives you secure access to your electronic health record. If you see a primary care provider, you can also send messages to your care team and make appointments. If you have questions, please call your primary care clinic.  If you do not have a primary care provider, please call [phone] and they will assist you.        Care EveryWhere ID     This is your Care EveryWhere ID. This could be used by other organizations to access your [facility] medical records  [id number]        Equal Access to Services     PIPPA KOCH AH: Garfield Price, waaxda luqadaha, qaybta kaalmada adezafar, fozia elizabeth. So [facility] [phone].    ATENCIÓN: Si habla español, tiene a xiong disposición servicios gratuitos de asistencia lingüística. Llame al [phone].    We comply with applicable federal civil rights laws and [place] laws. We do not discriminate on the basis of race, color, national origin, age, disability sex, sexual orientation or gender identity.               Review of your medicines      START taking        Dose / Directions    benztropine 1 MG tablet   Commonly known as:  COGENTIN   Used for:  Schizoaffective " disorder, bipolar type (H)        Dose:  1 mg   Take 1 tablet (1 mg) by mouth 2 times daily as needed (restlessness due to medications)   Quantity:  30 tablet   Refills:  1       eszopiclone 3 MG tablet   Commonly known as:  LUNESTA   Used for:  Primary insomnia        Dose:  3 mg   Take 1 tablet (3 mg) by mouth At Bedtime   Quantity:  30 tablet   Refills:  1       * lithium 300 MG CR tablet   Commonly known as:  ESKALITH/LITHOBID   Used for:  Schizoaffective disorder, bipolar type (H)        Dose:  300 mg   Take 1 tablet (300 mg) by mouth every morning   Quantity:  30 tablet   Refills:  1       * lithium 300 MG CR tablet   Commonly known as:  ESKALITH/LITHOBID   Used for:  Schizoaffective disorder, bipolar type (H)        Dose:  600 mg   Take 2 tablets (600 mg) by mouth every evening   Quantity:  60 tablet   Refills:  1       LORazepam 0.5 MG tablet   Commonly known as:  ATIVAN   Used for:  Anxiety, Schizoaffective disorder, bipolar type (H)        Dose:  0.5 mg   Take 1 tablet (0.5 mg) by mouth 2 times daily as needed for anxiety   Quantity:  30 tablet   Refills:  1       prazosin 2 MG capsule   Commonly known as:  MINIPRESS   Used for:  PTSD (post-traumatic stress disorder)        Dose:  2 mg   Take 1 capsule (2 mg) by mouth At Bedtime   Quantity:  30 capsule   Refills:  1       propranolol 10 MG tablet   Commonly known as:  INDERAL   Used for:  Schizoaffective disorder, bipolar type (H), PTSD (post-traumatic stress disorder), Anxiety        Dose:  10 mg   Take 1 tablet (10 mg) by mouth 2 times daily   Quantity:  60 tablet   Refills:  1       * risperiDONE 0.25 MG tablet   Commonly known as:  risperDAL   Used for:  Schizoaffective disorder, bipolar type (H)        Dose:  0.25 mg   Take 1 tablet (0.25 mg) by mouth daily   Quantity:  30 tablet   Refills:  1       * risperiDONE 0.5 MG tablet   Commonly known as:  risperDAL   Used for:  Schizoaffective disorder, bipolar type (H)        Dose:  0.5 mg   Take 1 tablet  (0.5 mg) by mouth daily   Quantity:  30 tablet   Refills:  1       * risperiDONE 1 MG tablet   Commonly known as:  risperDAL   Used for:  Schizoaffective disorder, bipolar type (H)        Dose:  1 mg   Take 1 tablet (1 mg) by mouth At Bedtime   Quantity:  30 tablet   Refills:  1       * Notice:  This list has 5 medication(s) that are the same as other medications prescribed for you. Read the directions carefully, and ask your doctor or other care provider to review them with you.      CONTINUE these medicines which may have CHANGED, or have new prescriptions. If we are uncertain of the size of tablets/capsules you have at home, strength may be listed as something that might have changed.        Dose / Directions    gabapentin 100 MG capsule   Commonly known as:  NEURONTIN   This may have changed:    - medication strength  - how much to take   Used for:  Anxiety, Schizoaffective disorder, bipolar type (H)        Dose:  100 mg   Take 1 capsule (100 mg) by mouth 3 times daily   Quantity:  90 capsule   Refills:  1       OLANZapine 10 MG tablet   Commonly known as:  zyPREXA   This may have changed:    - medication strength  - how much to take  - when to take this  - reasons to take this  - additional instructions   Used for:  Schizoaffective disorder, bipolar type (H)        Dose:  10 mg   Take 1 tablet (10 mg) by mouth daily as needed For severe agitation or hallucinations or paranoia   Quantity:  30 tablet   Refills:  1         STOP taking     baclofen 10 MG tablet   Commonly known as:  LIORESAL           CYMBALTA PO           hydrOXYzine 25 MG capsule   Commonly known as:  VISTARIL           ondansetron 4 MG tablet   Commonly known as:  ZOFRAN           traZODone 100 MG tablet   Commonly known as:  DESYREL                Where to get your medicines      These medications were [place] [facility] [place] [address], [place] [address]     Phone:  [phone]      benztropine 1 MG tablet    gabapentin 100 MG capsule    lithium 300 MG CR tablet    lithium 300 MG CR tablet    OLANZapine 10 MG tablet    prazosin 2 MG capsule    propranolol 10 MG tablet    risperiDONE 0.25 MG tablet    risperiDONE 0.5 MG tablet    risperiDONE 1 MG tablet         Some of these will need a paper prescription and others can be bought over the counter. Ask your nurse if you have questions.     Bring a paper prescription for each of these medications     eszopiclone 3 MG tablet    LORazepam 0.5 MG tablet                Protect others around you: Learn how to safely use, store and throw away your medicines at www.disposemymeds.org.             Medication List: This is a list of all your medications and when to take them. Check marks below indicate your daily home schedule. Keep this list as a reference.      Medications           Morning Afternoon Evening Bedtime As Needed    benztropine 1 MG tablet   Commonly known as:  COGENTIN   Take 1 tablet (1 mg) by mouth 2 times daily as needed (restlessness due to medications)   Last time this was given:  1 mg on 6/21/2017 10:16 PM                                eszopiclone 3 MG tablet   Commonly known as:  LUNESTA   Take 1 tablet (3 mg) by mouth At Bedtime   Last time this was given:  3 mg on 6/26/2017  8:03 PM                                gabapentin 100 MG capsule   Commonly known as:  NEURONTIN   Take 1 capsule (100 mg) by mouth 3 times daily   Last time this was given:  100 mg on 6/27/2017  7:39 AM                                * lithium 300 MG CR tablet   Commonly known as:  ESKALITH/LITHOBID   Take 1 tablet (300 mg) by mouth every morning   Last time this was given:  300 mg on 6/27/2017  7:39 AM                                * lithium 300 MG CR tablet   Commonly known as:  ESKALITH/LITHOBID   Take 2 tablets (600 mg) by mouth every evening   Last time this was given:  300 mg on 6/27/2017  7:39 AM                                LORazepam 0.5 MG tablet    Commonly known as:  ATIVAN   Take 1 tablet (0.5 mg) by mouth 2 times daily as needed for anxiety   Last time this was given:  0.5 mg on 6/27/2017  7:39 AM                                OLANZapine 10 MG tablet   Commonly known as:  zyPREXA   Take 1 tablet (10 mg) by mouth daily as needed For severe agitation or hallucinations or paranoia   Last time this was given:  10 mg on 6/4/2017  9:20 PM                                prazosin 2 MG capsule   Commonly known as:  MINIPRESS   Take 1 capsule (2 mg) by mouth At Bedtime   Last time this was given:  2 mg on 6/26/2017  8:02 PM                                propranolol 10 MG tablet   Commonly known as:  INDERAL   Take 1 tablet (10 mg) by mouth 2 times daily   Last time this was given:  10 mg on 6/27/2017  7:39 AM                                * risperiDONE 0.25 MG tablet   Commonly known as:  risperDAL   Take 1 tablet (0.25 mg) by mouth daily   Last time this was given:  0.5 mg on 6/27/2017  7:39 AM                                * risperiDONE 0.5 MG tablet   Commonly known as:  risperDAL   Take 1 tablet (0.5 mg) by mouth daily   Last time this was given:  0.5 mg on 6/27/2017  7:39 AM                                * risperiDONE 1 MG tablet   Commonly known as:  risperDAL   Take 1 tablet (1 mg) by mouth At Bedtime   Last time this was given:  0.5 mg on 6/27/2017  7:39 AM                                * Notice:  This list has 5 medication(s) that are the same as other medications prescribed for you. Read the directions carefully, and ask your doctor or other care provider to review them with you.

## 2017-05-12 NOTE — ED NOTES
Pt requesting shot so she can calm down and 'think better' - patient is cooperative and attentive to plan - IM given

## 2017-05-13 LAB
ALBUMIN SERPL-MCNC: 3.6 G/DL (ref 3.4–5)
ALP SERPL-CCNC: 61 U/L (ref 40–150)
ALT SERPL W P-5'-P-CCNC: 27 U/L (ref 0–50)
ANION GAP SERPL CALCULATED.3IONS-SCNC: 7 MMOL/L (ref 3–14)
AST SERPL W P-5'-P-CCNC: 24 U/L (ref 0–45)
BASOPHILS # BLD AUTO: 0 10E9/L (ref 0–0.2)
BASOPHILS NFR BLD AUTO: 0.4 %
BILIRUB SERPL-MCNC: 0.7 MG/DL (ref 0.2–1.3)
BUN SERPL-MCNC: 12 MG/DL (ref 7–30)
CALCIUM SERPL-MCNC: 8.7 MG/DL (ref 8.5–10.1)
CHLORIDE SERPL-SCNC: 111 MMOL/L (ref 94–109)
CHOLEST SERPL-MCNC: 149 MG/DL
CO2 SERPL-SCNC: 25 MMOL/L (ref 20–32)
CREAT SERPL-MCNC: 0.7 MG/DL (ref 0.52–1.04)
DIFFERENTIAL METHOD BLD: NORMAL
EOSINOPHIL # BLD AUTO: 0.2 10E9/L (ref 0–0.7)
EOSINOPHIL NFR BLD AUTO: 2 %
ERYTHROCYTE [DISTWIDTH] IN BLOOD BY AUTOMATED COUNT: 13.6 % (ref 10–15)
GFR SERPL CREATININE-BSD FRML MDRD: ABNORMAL ML/MIN/1.7M2
GLUCOSE SERPL-MCNC: 88 MG/DL (ref 70–99)
HCT VFR BLD AUTO: 42.4 % (ref 35–47)
HDLC SERPL-MCNC: 65 MG/DL
HGB BLD-MCNC: 14 G/DL (ref 11.7–15.7)
IMM GRANULOCYTES # BLD: 0 10E9/L (ref 0–0.4)
IMM GRANULOCYTES NFR BLD: 0.3 %
LDLC SERPL CALC-MCNC: 68 MG/DL
LYMPHOCYTES # BLD AUTO: 2.3 10E9/L (ref 0.8–5.3)
LYMPHOCYTES NFR BLD AUTO: 29.6 %
MCH RBC QN AUTO: 28.5 PG (ref 26.5–33)
MCHC RBC AUTO-ENTMCNC: 33 G/DL (ref 31.5–36.5)
MCV RBC AUTO: 86 FL (ref 78–100)
MONOCYTES # BLD AUTO: 0.5 10E9/L (ref 0–1.3)
MONOCYTES NFR BLD AUTO: 6.2 %
NEUTROPHILS # BLD AUTO: 4.9 10E9/L (ref 1.6–8.3)
NEUTROPHILS NFR BLD AUTO: 61.5 %
NONHDLC SERPL-MCNC: 84 MG/DL
NRBC # BLD AUTO: 0 10*3/UL
NRBC BLD AUTO-RTO: 0 /100
PLATELET # BLD AUTO: 295 10E9/L (ref 150–450)
POTASSIUM SERPL-SCNC: 3.7 MMOL/L (ref 3.4–5.3)
PROT SERPL-MCNC: 7.4 G/DL (ref 6.8–8.8)
RBC # BLD AUTO: 4.91 10E12/L (ref 3.8–5.2)
SODIUM SERPL-SCNC: 143 MMOL/L (ref 133–144)
TRIGL SERPL-MCNC: 78 MG/DL
TSH SERPL DL<=0.005 MIU/L-ACNC: 1.11 MU/L (ref 0.4–4)
WBC # BLD AUTO: 7.9 10E9/L (ref 4–11)

## 2017-05-13 PROCEDURE — 80061 LIPID PANEL: CPT | Performed by: PSYCHIATRY & NEUROLOGY

## 2017-05-13 PROCEDURE — 36415 COLL VENOUS BLD VENIPUNCTURE: CPT | Performed by: PSYCHIATRY & NEUROLOGY

## 2017-05-13 PROCEDURE — 85025 COMPLETE CBC W/AUTO DIFF WBC: CPT | Performed by: PSYCHIATRY & NEUROLOGY

## 2017-05-13 PROCEDURE — 84443 ASSAY THYROID STIM HORMONE: CPT | Performed by: PSYCHIATRY & NEUROLOGY

## 2017-05-13 PROCEDURE — 99223 1ST HOSP IP/OBS HIGH 75: CPT | Mod: AI | Performed by: PSYCHIATRY & NEUROLOGY

## 2017-05-13 PROCEDURE — 25000132 ZZH RX MED GY IP 250 OP 250 PS 637: Performed by: PSYCHIATRY & NEUROLOGY

## 2017-05-13 PROCEDURE — 12400001 ZZH R&B MH UMMC

## 2017-05-13 PROCEDURE — 80053 COMPREHEN METABOLIC PANEL: CPT | Performed by: PSYCHIATRY & NEUROLOGY

## 2017-05-13 RX ORDER — OLANZAPINE 5 MG/1
5 TABLET ORAL 2 TIMES DAILY
Status: DISCONTINUED | OUTPATIENT
Start: 2017-05-13 | End: 2017-05-15

## 2017-05-13 RX ADMIN — OLANZAPINE 10 MG: 10 TABLET, FILM COATED ORAL at 13:18

## 2017-05-13 RX ADMIN — OLANZAPINE 5 MG: 5 TABLET, FILM COATED ORAL at 21:36

## 2017-05-13 RX ADMIN — NICOTINE POLACRILEX 4 MG: 2 GUM, CHEWING ORAL at 10:58

## 2017-05-13 ASSESSMENT — ACTIVITIES OF DAILY LIVING (ADL)
GROOMING: INDEPENDENT
BATHING: 0-->INDEPENDENT
AMBULATION: 0-->INDEPENDENT
LAUNDRY: UNABLE TO COMPLETE
NUMBER_OF_TIMES_PATIENT_HAS_FALLEN_WITHIN_LAST_SIX_MONTHS: 5
RETIRED_EATING: 0-->INDEPENDENT
DRESS: INDEPENDENT
ORAL_HYGIENE: INDEPENDENT
TOILETING: 0-->INDEPENDENT
GROOMING: INDEPENDENT
COGNITION: 0 - NO COGNITION ISSUES REPORTED
DRESS: INDEPENDENT
WHICH_OF_THE_ABOVE_FUNCTIONAL_RISKS_HAD_A_RECENT_ONSET_OR_CHANGE?: AMBULATION
TRANSFERRING: 0-->INDEPENDENT
RETIRED_COMMUNICATION: 0-->UNDERSTANDS/COMMUNICATES WITHOUT DIFFICULTY
SWALLOWING: 0-->SWALLOWS FOODS/LIQUIDS WITHOUT DIFFICULTY
FALL_HISTORY_WITHIN_LAST_SIX_MONTHS: YES
ORAL_HYGIENE: INDEPENDENT
LAUNDRY: WITH SUPERVISION
DRESS: 0-->INDEPENDENT

## 2017-05-13 NOTE — PLAN OF CARE
Problem: General Plan of Care (Inpatient Behavioral)  Goal: Team Discussion  Team Plan:   BEHAVIORAL TEAM DISCUSSION     Continued Stay Criteria/Rationale: Patient is newly admitted with symptoms of psychosis. Evaluation in process.  Plan: Provide a safe environment and therapeutic milieu. Encourage participation in unit programming. Develop appropriate aftercare plan.  Participants: Pricila EGAN; Valentino ESPINO  Summary/Recommendation: Patient will be seen by the on-call psychiatrist. Medications will be evaluated.  Attempt to gather collateral information. Ensure appropriate aftercare is in place.  Medical/Physical: stable  Progress: no improvement     Illness Management Recovery model: Personal Plan of Care     Patient has not yet completed Personal Plan of Care

## 2017-05-13 NOTE — PROGRESS NOTES
Initial Psychosocial Assessment    I have reviewed the chart, met with the patient, and developed Care Plan.  Information for assessment was obtained from patient and chart notes.     Presenting Problem:  Patient was transferred from [facility] and admitted on a voluntary basis with symptoms of psychosis and haven.  She has been paranoid and delusional prior to admission.  She has been off medications. She contacted police because she believes that 3 black cars were following her.  She has had recent ED visits with same symptoms.  Today patient appears somewhat uncomfortable and agitated. She asks that the interview room door remain open. She asks when she can leave.  Provides minimal answers to questions.    History of Mental Health and Chemical Dependency:  Patient has a history of mental health admissions.  Diagnoses include bipolar, ADHD and polysubstance abuse.     Family Description (Constellation, Family Psychiatric History):  Unable to assess.    Significant Life Events (Illness, Abuse, Trauma, Death):  Unable to assess.    Living Situation:  Records state patient was recently kicked out of her parents' home due to her behaviors/symptoms as they believe she has been using street drugs.     Educational Background:  Patient states she has a bachelor's degree in biology    Occupational History:  Patient states she has worked at a [employer] but cannot clarify if she currently has a job.    Financial Status:  Unable to assess.    Legal Issues:  Unable to assess.    Ethnic/Cultural Issues:  No issues noted.    Spiritual Orientation:  Unable to assess     Service History:  None     Social Functioning (organization, interests):  Not assessed    Current Treatment Providers are:  PCP is Dr. Ragsdale at [facility] [phone]  Records indicate patient has a psychiatrist but patient states she does not have a psychiatrist.    Social Service Assessment/Plan:  Patient asks why she can't be  discharged. She asks if she is on a hold.  She asks if she will be committed. She states she would rather be outside and that she likes being outdoors and walking.  She indicates she was walking for days prior to admission.  She is waiting to talk to the on-call psychiatrist to evaluate her and plans to ask to be discharged.  On-call psychiatrist will see and evaluate patient today.  CTC available to assist as needed.

## 2017-05-13 NOTE — ED NOTES
Pt increasing in anxiety - increased nausea with emesis x1 - ativan given for comfort - update called to [facility].

## 2017-05-13 NOTE — H&P
Ayana Prasad was seen for 70 minutes on 05/13/2017.  Greater than 50% of time was spent on counseling and coordinating care, clarifying diagnostic prognostic issues, presence of support in community.      CHIEF COMPLAINT AND REASON FOR ADMISSION:  The patient is a 26-year-old  female with history of bipolar affective disorder who was admitted because of paranoia, possible delusions.  The patient reported that they were 3 black cars following her and she shared her belief with her doctor.      HISTORY OF PRESENT ILLNESS:  The patient was seen in the emergency department by the same physician years ago for paranoia, was prescribed Vistaril, but did not take it.  Was also seen by DEC in 06/2016 and 07/2016.  The patient today presented as only a partially reliable historian.  She reported that she has been having thoughts that 3 black cars were after her only for the last day or two.  She appeared to be very uninterested in the interview, had difficulties following and tracking and was focused on being discharged as soon as possible.  She repeatedly told me that she would like to stop all questions and would like to be discharged.  When asked about reason for admission, she said that police brought me here.  When asked why the police brought her here, she told me that because I was followed by 3 black cars.  She could not explain any reason for why she would be followed by black cars and who people in black cars could be.  When asked if they were government agency, she said it could have been so.  She reported quite significant anxiety, racing thoughts, not sleeping at night, walking nonstop as she had sunburned neck.  Denied presence of suicidal or homicidal thoughts.  Said that she was diagnosed with bipolar, but did not believe that she had one.  The patient denied auditory or visual hallucinations; however, during the interview, she appeared to be listening to internal stimuli and I suspect that she in  "fact might have been experiencing auditory hallucinations.  From collateral sources the patient was seen off on Friday 1 week ago at [facility] due to blackout from dehydration, forehead contusion and said that her parents kicked her out of the house because they believe that she was using drugs.  According to the patient's friend, back then she said that she was seen in hospital on Saturday and Sunday for panic attacks.  The patient apparently has not been compliant with her current medications, but when asked they were Seroquel and Zyprexa.  Said that she could not take Seroquel because \"it makes me afraid.\"  She did not say anything about Zyprexa.  The patient is frequent marijuana user.      PAST PSYCHIATRIC HISTORY:  Has extensive previous psychiatric history.  Was hospitalized at [facility] but possibly at [facility] and [facility].  The patient in the past was diagnosed with drug-induced mood disorder, dextromethorphan overdose, borderline personality disorder.  In addition to the above-mentioned medications, she was also treated with lamotrigine, ziprasidone.  The patient apparently does not have a psychiatrist.  Sees a therapist at [facility], her name is Alice.  Never had any chemical dependency treatment.      PAST MEDICAL HISTORY:  Treated for cauda equina syndrome in January of this year.        ALLERGIES:  Allergic to adhesive tape, droperidol causes anxiety.      FAMILY HISTORY AND SOCIAL HISTORY:  The patient says she grew up in [place], did not graduate from high school, was a B student.  Minimized any alcohol use but openly admits to using opiates on a regular basis as well as heroin, acid and marijuana.  Had several disorderly conduct charges, several possession charges as well as property damage charges.  Does say that both parents are alive and .  Has 2 siblings, an older brother and older sister.  Denies any mental illness, chemical dependency " history in the family.  Says that childhood was happy at times, other times unhappy.  She denies any physical or sexual abuse while growing up.      VITAL SIGNS:  Temperature 97, respirations 16, heart rate 75, blood pressure 134/86.      PHYSICAL EXAMINATION:  Please refer to the emergency room doctor's note from 05/12/2017.       REVIEW OF SYSTEMS:  A 12-point review of systems was positive for mental health, otherwise negative.      MENTAL STATUS EXAMINATION:  She is an overweight  female with short haircut.  She was semi-cooperative, showed minimal interest in the interview, frequently asked when she could be discharged.  She tended to look away or down, frequently had to repeat my questions again and again and she appeared to be rather uninterested or possibly responding to the internal stimuli.  She had at least some delusional ideas about 3 black cars following her and said that they were quite real for her and said irritability as no one believed her about that.  Denied suicidal or homicidal thoughts.  Thought processes were somewhat loose and disorganized.  She admitted to severe anxiety.  Ability to focus and concentrate was clearly impaired.  I would describe her insight as partial, judgment moderately impaired.  The patient appears to have average intelligence.  Her station, gait and coordination were within normal limits.  She did show some psychomotor agitation (fidgety during the interview).      IMPRESSION:     1.  Bipolar affective disorder, acute haven.     2.  Opiate use disorder, severe.   3.  Marijuana use disorder.     4.  Past diagnosis of borderline personality disorder.      TREATMENT PLAN:  The patient is currently hospitalized as voluntary.  However, due to her number of visits to the emergency department and psychotic/manic symptoms, I feel it is appropriate to evaluate her for 72-hour hold if she demands to leave against medical advice.  She agreed to be started on Zyprexa.          MICHAEL HYDE MD             D: 2017 13:30   T: 2017 15:36   MT:       Name:     SOMMER GARCIA   MRN:      [id number]        Account:      [id number]   :      1990           Admitted:     [id number]      Document: [id number]

## 2017-05-13 NOTE — PLAN OF CARE
Problem: Manic Symptoms  Goal: Manic Symptoms  Signs and symptoms of listed problems will be absent or manageable.   25 yo female admitted from [facility] on a transport hold. She was in the ED there and was out of control manic. They medicated her and she arrives here sedated, needing help to bed. She told [facility] ED that there were black cars following her. She was paranoid and delusional.  She is un-able to participate in the admit interview. Staff will attempt this interview tomorrow.

## 2017-05-14 PROCEDURE — 90853 GROUP PSYCHOTHERAPY: CPT

## 2017-05-14 PROCEDURE — 25000132 ZZH RX MED GY IP 250 OP 250 PS 637: Performed by: PSYCHIATRY & NEUROLOGY

## 2017-05-14 PROCEDURE — 12400001 ZZH R&B MH UMMC

## 2017-05-14 RX ADMIN — NICOTINE POLACRILEX 4 MG: 2 GUM, CHEWING ORAL at 16:21

## 2017-05-14 RX ADMIN — NICOTINE POLACRILEX 4 MG: 2 GUM, CHEWING ORAL at 06:53

## 2017-05-14 RX ADMIN — OLANZAPINE 5 MG: 5 TABLET, FILM COATED ORAL at 08:13

## 2017-05-14 RX ADMIN — NICOTINE POLACRILEX 4 MG: 2 GUM, CHEWING ORAL at 10:03

## 2017-05-14 RX ADMIN — OLANZAPINE 5 MG: 5 TABLET, FILM COATED ORAL at 20:56

## 2017-05-14 RX ADMIN — OLANZAPINE 10 MG: 10 TABLET, FILM COATED ORAL at 12:40

## 2017-05-14 RX ADMIN — HYDROXYZINE HYDROCHLORIDE 50 MG: 25 TABLET ORAL at 12:41

## 2017-05-14 RX ADMIN — HYDROXYZINE HYDROCHLORIDE 50 MG: 25 TABLET ORAL at 08:14

## 2017-05-14 RX ADMIN — OLANZAPINE 10 MG: 10 TABLET, FILM COATED ORAL at 18:13

## 2017-05-14 ASSESSMENT — ACTIVITIES OF DAILY LIVING (ADL)
DRESS: INDEPENDENT
ORAL_HYGIENE: INDEPENDENT
LAUNDRY: WITH SUPERVISION
HYGIENE/GROOMING: INDEPENDENT

## 2017-05-14 NOTE — PLAN OF CARE
Problem: Manic Symptoms  Goal: Manic Symptoms  Signs and symptoms of listed problems will be absent or manageable.   Outcome: No Change  Patient displays flat, depressed, sullen, affect. In 1:1, states that she know that there are black cars following her. States that she has been walking constantly and away from her home. States that she had been at various hospitals trying to get help this week. States that she called the police to have them find out who is following her. Patient has been out of her room in the lounge all shift. Stated that she wants to leave because she needs to get outside and walk around. Patient requested and signed a 12 hour intent to leave. Patient informed that she had been placed on a 72 hour hold at 1430.

## 2017-05-15 LAB
AMPHETAMINES UR QL SCN: NORMAL
BARBITURATES UR QL: NORMAL
BENZODIAZ UR QL: NORMAL
CANNABINOIDS UR QL SCN: NORMAL
COCAINE UR QL: NORMAL
ETHANOL UR QL SCN: NORMAL
OPIATES UR QL SCN: NORMAL

## 2017-05-15 PROCEDURE — 80307 DRUG TEST PRSMV CHEM ANLYZR: CPT | Performed by: PSYCHIATRY & NEUROLOGY

## 2017-05-15 PROCEDURE — 80320 DRUG SCREEN QUANTALCOHOLS: CPT | Performed by: PSYCHIATRY & NEUROLOGY

## 2017-05-15 PROCEDURE — 25000132 ZZH RX MED GY IP 250 OP 250 PS 637: Performed by: PSYCHIATRY & NEUROLOGY

## 2017-05-15 PROCEDURE — 99232 SBSQ HOSP IP/OBS MODERATE 35: CPT | Performed by: PSYCHIATRY & NEUROLOGY

## 2017-05-15 PROCEDURE — 12400001 ZZH R&B MH UMMC

## 2017-05-15 RX ORDER — ALOE VERA
GEL (GRAM) TOPICAL
Status: DISCONTINUED | OUTPATIENT
Start: 2017-05-15 | End: 2017-06-27 | Stop reason: HOSPADM

## 2017-05-15 RX ORDER — TRIFLUOPERAZINE HYDROCHLORIDE 2 MG/1
2 TABLET, FILM COATED ORAL 3 TIMES DAILY
Status: DISCONTINUED | OUTPATIENT
Start: 2017-05-15 | End: 2017-05-30

## 2017-05-15 RX ORDER — LORAZEPAM 0.5 MG/1
0.5 TABLET ORAL 2 TIMES DAILY PRN
Status: DISCONTINUED | OUTPATIENT
Start: 2017-05-15 | End: 2017-06-27 | Stop reason: HOSPADM

## 2017-05-15 RX ORDER — ALOE VERA
GEL (GRAM) TOPICAL
Status: DISCONTINUED | OUTPATIENT
Start: 2017-05-15 | End: 2017-05-15

## 2017-05-15 RX ADMIN — LORAZEPAM 0.5 MG: 0.5 TABLET ORAL at 11:23

## 2017-05-15 RX ADMIN — NICOTINE POLACRILEX 2 MG: 2 GUM, CHEWING ORAL at 08:34

## 2017-05-15 RX ADMIN — NICOTINE POLACRILEX 4 MG: 2 GUM, CHEWING ORAL at 15:12

## 2017-05-15 RX ADMIN — TRAZODONE HYDROCHLORIDE 50 MG: 50 TABLET ORAL at 22:34

## 2017-05-15 RX ADMIN — TRIFLUOPERAZINE HYDROCHLORIDE 2 MG: 2 TABLET, FILM COATED ORAL at 15:11

## 2017-05-15 RX ADMIN — TRIFLUOPERAZINE HYDROCHLORIDE 2 MG: 2 TABLET, FILM COATED ORAL at 20:48

## 2017-05-15 RX ADMIN — HYDROXYZINE HYDROCHLORIDE 50 MG: 25 TABLET ORAL at 10:17

## 2017-05-15 RX ADMIN — Medication: at 13:00

## 2017-05-15 RX ADMIN — NICOTINE POLACRILEX 4 MG: 2 GUM, CHEWING ORAL at 11:35

## 2017-05-15 RX ADMIN — Medication: at 20:47

## 2017-05-15 RX ADMIN — NICOTINE POLACRILEX 4 MG: 2 GUM, CHEWING ORAL at 06:30

## 2017-05-15 RX ADMIN — LORAZEPAM 0.5 MG: 0.5 TABLET ORAL at 17:34

## 2017-05-15 RX ADMIN — NICOTINE POLACRILEX 4 MG: 2 GUM, CHEWING ORAL at 20:51

## 2017-05-15 NOTE — PROGRESS NOTES
05/14/17 2100   Behavioral Health   Thinking paranoid;delusional   Orientation person: oriented;place: oriented;date: oriented;time: oriented   Memory baseline memory   Insight poor   Judgement impaired   Eye Contact at examiner   Affect tense;irritable   Mood depressed;irritable   Physical Appearance/Attire disheveled   Hygiene neglected grooming - unclean body, hair, teeth   Suicidality other (see comments)  (DIANNA)   Self Injury other (see comment)  (DIANNA)   Activity withdrawn   Speech (WDL) WDL   Medication Sensitivity (WDL) WDL   Psychomotor Gait (WDL) WDL   Activities of Daily Living   Hygiene/Grooming independent   Oral Hygiene independent   Dress independent   Laundry with supervision   Room Organization independent   patient anticipated discharge today, but did not discharge. As a result, patient seemed quite irritable toward staff and spent most of the shift isolating in her room before going to bed early.

## 2017-05-15 NOTE — PLAN OF CARE
"Problem: Manic Symptoms  Intervention: Social and Therapeutic Interv (Manic Symptoms)        Occupational Therapy:  Pt was given and completed a written self assessment.  OT staff explained the value of including pt in her treatment plan and offered options to meet her current needs/self-identified goals. Pt noted coming to hospital due to \", hospitals, confused.\"  She noted feeling anxious, angry, and having racing thoughts, nightmares, thoughts of self harm, increased use of substance, and hostile reactions to those who offer help.  Unable to identify any supports outside hospital or any personal strengths.  Because pt left the group after completing self assessment, more observation is needed to complete the OT evaluation. Not billed for brief attendance time.  Goals for hospitalization (selected from list):  Manage anxiety.  Manage anger. Improve focus and concentration.  Plan:  Assist in increasing pt awareness of support networks and coping skills as a means to healthy self management.  Provide experiential learning opportunities, exploring feelings identification and safe expression of needs.               "

## 2017-05-15 NOTE — PROGRESS NOTES
MD has decided to petition the probate court for commitment.  I left a voice mail for:  Prepetition Screening  [facility] Pre-Petition Screening   [address] [place] [address], [address]   [place] [address]   Ph: [phone]   Fx: [phone]  They called back. I made the refferal, I faxed in the required documents, placed originals and a copy in the chart and placed a copy in the scanning chart.  I met with pt. Pt did sign the CE auths. I explained the process with [facility] and she understands the process. She said this has been tried before.  [facility] Prepetiton Screener called -Edilma Moulton - [phone] - and said she will be here tomorrow. She asked for medical records to be faxed and I did this.      [address] [place] [id number] [phone]   Angely Steven, [facility]    [phone]   MSW - CASE COMMUNICATION        Pt has a controlled substance agreement signed Jan 2017 with Linh Freeman NP at [facility]. This was printed and given to RN and MD.

## 2017-05-15 NOTE — PROGRESS NOTES
"[facility], [place]   Psychiatric Progress Note         Interim History and Subjective Reports:   The patient's care was discussed with the treatment team during the daily team meeting.  Staff reports: no acute issues     She was sitting out in the commentary a however in a chair which she had placed its back against the wall.  In the interview room, she asked to leave the door open. She would frequently peer out of the window and through the door opening.    She requested to be discharged from the hospital. When I inquired what her plan would be after she leaves she stated \"I just need to walk.\" I inquired regarding means for food and shelter to which he responded \"I don't need to eat, I don't need to sleep, I don't need medications, I just want to walk.\" She had no specific destination in mind.     She vaguely confirmed paranoia discussed in the emergency room records and H&P however would not elaborate. She continues to feel anxious secondary to paranoid concerns. Continuously walking seems to bring her some feeling of safety. She does not feel safe from others in the hospital.     She denied suicidal and homicidal thoughts. She did confirm at least one prior suicide attempt however did not disclose details.         Scheduled Medications:       trifluoperazine  2 mg Oral TID          Allergies:      Allergies   Allergen Reactions     Adhesive Tape Hives     Droperidol Anxiety          Labs:   No results found for this or any previous visit (from the past 24 hour(s)).       Vitals:   /68  Pulse 97  Temp 97.7  F (36.5  C)  Resp 16  Ht 1.676 m (5' 6\")  Wt 88 kg (194 lb)  BMI 31.31 kg/m2  Weight: 194 lbs 0 oz          Height: 5' 6\"           Body mass index is 31.31 kg/(m^2).        Mental Status Examination:   Appearance: awake, alert and adequately groomed  Attitude:  guarded  Eye Contact:  poor , looking around room  Mood:  anxious  Affect:  mood congruent  Speech:  Limited " "content  Psychomotor Behavior:  no evidence of tardive dyskinesia, dystonia, or tics. Seemed restless with her leg moving in an anxious fashion.  Throught Process:  linear  Associations:  no loose associations  Thought Content:  no evidence of suicidal ideation or homicidal ideation and Paranoia was evident  Insight:  partial  Judgement:  limited  Oriented to:  time, person, and place  Attention Span and Concentration:  limited  Recent and Remote Memory:  intact         DIagnoses:     1. Bipolar disorder type 1 - manic episode with psychosis      Rule out Schizoaffective disorder - bipolar type  2. Opiate use disorder, severe.   3. Marijuana use disorder.   4. History of borderline personality disorder.          Plan:   1. Biological treatments:  --she is hesitant to accept medications in general, especially those with familiar names. She is refusing zyprexa which I will d/c. She was agreeable for a trial of stelazine for anxiety associated with paranoia.   --prn ativan is available for anxiety  --attempting to gather a urine sample for drug screen however one has not been provided yet.     2. Psychosocial treatments:   --addressed with SW consult and groups    3. Dispo:  --72 hour hold however is not willing to sign in voluntarily. Petition for committment will be initiated noting severity of distress from paranioa, history of suicide attempts, and patients inability to present a meaning plan of care to maintain safety \"I dont need meds. I dont need food and I don't need to sleep. I just need to walk and keep walking.\"           "

## 2017-05-15 NOTE — PLAN OF CARE
Problem: Manic Symptoms  Intervention: Social and Therapeutic Interv (Manic Symptoms)        Occupational Therapy:  Pt has yet to attend OT-facilitated group sessions.  Plan:  Pt will be encouraged to attend groups and be provided a self assessment form.  OT staff will explain the value of OT including pt in her treatment plan and offer options to meet her needs and identified goals.

## 2017-05-16 PROCEDURE — 25000132 ZZH RX MED GY IP 250 OP 250 PS 637: Performed by: PSYCHIATRY & NEUROLOGY

## 2017-05-16 PROCEDURE — 99232 SBSQ HOSP IP/OBS MODERATE 35: CPT | Performed by: PSYCHIATRY & NEUROLOGY

## 2017-05-16 PROCEDURE — 12400001 ZZH R&B MH UMMC

## 2017-05-16 PROCEDURE — 97150 GROUP THERAPEUTIC PROCEDURES: CPT | Mod: GO

## 2017-05-16 PROCEDURE — 25000125 ZZHC RX 250: Performed by: PSYCHIATRY & NEUROLOGY

## 2017-05-16 RX ORDER — PRAZOSIN HYDROCHLORIDE 2 MG/1
2 CAPSULE ORAL AT BEDTIME
Status: DISCONTINUED | OUTPATIENT
Start: 2017-05-16 | End: 2017-06-27 | Stop reason: HOSPADM

## 2017-05-16 RX ORDER — ONDANSETRON 4 MG/1
4 TABLET, ORALLY DISINTEGRATING ORAL EVERY 6 HOURS PRN
Status: DISCONTINUED | OUTPATIENT
Start: 2017-05-16 | End: 2017-06-27 | Stop reason: HOSPADM

## 2017-05-16 RX ADMIN — NICOTINE POLACRILEX 4 MG: 2 GUM, CHEWING ORAL at 10:42

## 2017-05-16 RX ADMIN — TRIFLUOPERAZINE HYDROCHLORIDE 2 MG: 2 TABLET, FILM COATED ORAL at 20:28

## 2017-05-16 RX ADMIN — NICOTINE POLACRILEX 4 MG: 2 GUM, CHEWING ORAL at 06:32

## 2017-05-16 RX ADMIN — LORAZEPAM 0.5 MG: 0.5 TABLET ORAL at 08:08

## 2017-05-16 RX ADMIN — OLANZAPINE 10 MG: 10 TABLET, FILM COATED ORAL at 14:02

## 2017-05-16 RX ADMIN — TRIFLUOPERAZINE HYDROCHLORIDE 2 MG: 2 TABLET, FILM COATED ORAL at 08:08

## 2017-05-16 RX ADMIN — ONDANSETRON 4 MG: 4 TABLET, ORALLY DISINTEGRATING ORAL at 13:06

## 2017-05-16 RX ADMIN — PRAZOSIN HYDROCHLORIDE 2 MG: 2 CAPSULE ORAL at 20:28

## 2017-05-16 RX ADMIN — NICOTINE POLACRILEX 4 MG: 2 GUM, CHEWING ORAL at 08:12

## 2017-05-16 RX ADMIN — HYDROXYZINE HYDROCHLORIDE 50 MG: 25 TABLET ORAL at 08:46

## 2017-05-16 RX ADMIN — TRIFLUOPERAZINE HYDROCHLORIDE 2 MG: 2 TABLET, FILM COATED ORAL at 13:07

## 2017-05-16 RX ADMIN — NICOTINE POLACRILEX 4 MG: 2 GUM, CHEWING ORAL at 16:14

## 2017-05-16 RX ADMIN — LORAZEPAM 0.5 MG: 0.5 TABLET ORAL at 16:15

## 2017-05-16 ASSESSMENT — ACTIVITIES OF DAILY LIVING (ADL)
DRESS: STREET CLOTHES
ORAL_HYGIENE: INDEPENDENT
GROOMING: INDEPENDENT
GROOMING: INDEPENDENT
DRESS: STREET CLOTHES
LAUNDRY: WITH SUPERVISION
ORAL_HYGIENE: INDEPENDENT

## 2017-05-16 NOTE — PROGRESS NOTES
Neville Sanz PPS was here today. She feels that she is leaning towards supporting the petition but she will staff this with her team tomorrow. She did not get a return call from mother yet. She will try father.  Pt asked to see me and asked me questions about the process. Pt was calm and thanked me for answering her questions.

## 2017-05-16 NOTE — PROGRESS NOTES
"[facility], [place]   Psychiatric Progress Note         Interim History and Subjective Reports:   The patient's care was discussed with the treatment team during the daily team meeting.  Staff reports: taking medications, no acute issues, no aggression, eating meals, attending some groups.     She feels better today. Anxiety is less and paranoia is less. She attributed this to medications and expressed her appreciation.     She asked for a medication to help with PTSD related nightmares. She works with her therapist on this issue. She did not want to disclose details of the trauma.     Patient denies SI/HI.  She denies AH/VH.          Scheduled Medications:       trifluoperazine  2 mg Oral TID          Allergies:      Allergies   Allergen Reactions     Adhesive Tape Hives     Droperidol Anxiety          Labs:     Recent Results (from the past 24 hour(s))   Drug abuse screen 6 urine (chem dep) ([facility])    Collection Time: 05/15/17  5:50 PM   Result Value Ref Range    Amphetamine Qual Urine  NEG     Negative   Cutoff for a negative amphetamine is 500 ng/mL or less.      Barbiturates Qual Urine  NEG     Negative   Cutoff for a negative barbiturate is 200 ng/mL or less.      Benzodiazepine Qual Urine  NEG     Negative   Cutoff for a negative benzodiazepine is 200 ng/mL or less.      Cannabinoids Qual Urine  NEG     Negative   Cutoff for a negative cannabinoid is 50 ng/mL or less.      Cocaine Qual Urine  NEG     Negative   Cutoff for a negative cocaine is 300 ng/mL or less.      Ethanol Qual Urine  NEG     Negative   Cutoff for a negative urine ethanol is 0.05 g/dL or less      Opiates Qualitative Urine  NEG     Negative   Cutoff for a negative opiate is 300 ng/mL or less.            Vitals:   /68  Pulse 97  Temp 97.7  F (36.5  C)  Resp 16  Ht 1.676 m (5' 6\")  Wt 88 kg (194 lb)  BMI 31.31 kg/m2  Weight: 194 lbs 0 oz          Height: 5' 6\"           Body mass index is 31.31 " kg/(m^2).        Mental Status Examination:   Appearance: awake, alert and adequately groomed  Attitude:  guarded, less  Eye Contact:  poor   Mood:  anxious and better  Affect:  intensity is blunted  Speech:  clear, coherent  Psychomotor Behavior:  no evidence of tardive dyskinesia, dystonia, or tics. Sat calmly today.   Throught Process:  linear  Associations:  no loose associations  Thought Content:  no evidence of suicidal ideation or homicidal ideation and Paranoia was evident however less intense   Insight:  partial  Judgement:  limited  Oriented to:  time, person, and place  Attention Span and Concentration:  limited  Recent and Remote Memory:  intact         DIagnoses:     1. Bipolar disorder type 1 - manic episode with psychosis      Rule out Schizoaffective disorder - bipolar type  2. PTSD  3. History of borderline personality disorder.   4. History of Marijuana use disorder.   5. History of opiate use disorder         Plan:   1. Biological treatments:  --Continue stelazine TID for psychosis/anxiety; tolerating well. Prn ativan is available while in the hospital.   --she hesitating to provide staff with urine drug screen however eventually did. Reviewed results and negative.   --add prazosin for PTSD related nightmares; risks reviewed. BP and heart rate should tolerate this addition.     2. Psychosocial treatments:   --addressed with SW consult and groups    3. Dispo:  --72 hour hold pending pre petition screening today; see my last note for details.

## 2017-05-16 NOTE — PROGRESS NOTES
"States she does not want to take prescribed medications as she does not like how they \"slow\" her down. Minimizing her mental health sxs.      05/16/17 1506   Behavioral Health   Hallucinations denies / not responding to hallucinations   Thinking intact   Orientation (all spheres)   Insight poor;denial of illness   Judgement impaired   Eye Contact at examiner   Affect full range affect   Mood mood is calm   Physical Appearance/Attire attire appropriate to age and situation   Hygiene well groomed   Suicidality other (see comments)  (denies current)   Self Injury other (see comment)  (none observed or reported)   Activity other (see comment)  (attended groups and participated)   Speech clear;coherent   Occupational Therapy   Type of Intervention structured groups   Response Participates   Hours 0.5   Psycho Education   Type of Intervention 1:1 intervention   Response participates, initiates socially appropriate   Hours 0.5   Treatment Detail IMR   Activities of Daily Living   Hygiene/Grooming independent   Oral Hygiene independent   Dress street clothes   Laundry with supervision   Room Organization independent     "

## 2017-05-16 NOTE — PLAN OF CARE
"Problem: Manic Symptoms  Goal: Manic Symptoms  Signs and symptoms of listed problems will be absent or manageable.   Outcome: Improving  Pt had a good evening overall. She enjoyed a visitor and overall was calm and cooperative. She gave a urine sample after initially refusing. She spoke about her compulsion to \"just keep walking\" and admitted that she would do just that if she were discharged. She was somewhat able to comprehend that walking barefoot for days on end put her in a vulnerable position physically, psychologically and physiologically. She was able to laugh, joke and overall presented fairly well. She is open to starting new psychotropic Rx.      "

## 2017-05-16 NOTE — PLAN OF CARE
"Problem: Manic Symptoms  Intervention: Social and Therapeutic Interv (Manic Symptoms)        Occupational Therapy:  Pt was given and completed a written self assessment.  OT staff explained the value of including pt in her treatment plan and offered options to meet her current needs/self-identified goals. Pt noted \", hospitals, confused 1 week or 2\" as recent stressors.  She acknowledged feeling anxious and overwhelmed, having racing thoughts, nightmares, and increased use of substance, as well as hostile reactions to those who offer help.  She listed \"walking, being outside\" as her only coping strategies.  Pt chose to leave the room after completing the self assessment; she did not stay to select or engage in an individual task; therefore, further observation is needed to complete the OT evaluation.  Goals for hospitalization (selected from list): Manage anger.  Manage anxiety.  Improve focus and concentration.  Plan:  Encourage ongoing participation to meet self-stated goals, implement positive coping skills, engage in graded opportunities for success, and provide assessment ongoing. Encourage feelings identification and expression in healthy ways.             "

## 2017-05-17 ENCOUNTER — APPOINTMENT (OUTPATIENT)
Dept: GENERAL RADIOLOGY | Facility: CLINIC | Age: 27
DRG: 885 | End: 2017-05-17
Attending: PSYCHIATRY & NEUROLOGY
Payer: COMMERCIAL

## 2017-05-17 PROCEDURE — 25000132 ZZH RX MED GY IP 250 OP 250 PS 637: Performed by: PSYCHIATRY & NEUROLOGY

## 2017-05-17 PROCEDURE — 97150 GROUP THERAPEUTIC PROCEDURES: CPT | Mod: GO

## 2017-05-17 PROCEDURE — 99232 SBSQ HOSP IP/OBS MODERATE 35: CPT | Performed by: PSYCHIATRY & NEUROLOGY

## 2017-05-17 PROCEDURE — 12400001 ZZH R&B MH UMMC

## 2017-05-17 PROCEDURE — 73130 X-RAY EXAM OF HAND: CPT | Mod: RT

## 2017-05-17 RX ORDER — IBUPROFEN 800 MG/1
800 TABLET, FILM COATED ORAL EVERY 6 HOURS PRN
Status: DISCONTINUED | OUTPATIENT
Start: 2017-05-17 | End: 2017-05-23

## 2017-05-17 RX ADMIN — HYDROXYZINE HYDROCHLORIDE 50 MG: 25 TABLET ORAL at 15:20

## 2017-05-17 RX ADMIN — TRIFLUOPERAZINE HYDROCHLORIDE 2 MG: 2 TABLET, FILM COATED ORAL at 08:17

## 2017-05-17 RX ADMIN — NICOTINE POLACRILEX 4 MG: 2 GUM, CHEWING ORAL at 13:13

## 2017-05-17 RX ADMIN — TRIFLUOPERAZINE HYDROCHLORIDE 2 MG: 2 TABLET, FILM COATED ORAL at 20:08

## 2017-05-17 RX ADMIN — NICOTINE POLACRILEX 4 MG: 2 GUM, CHEWING ORAL at 18:39

## 2017-05-17 RX ADMIN — NICOTINE POLACRILEX 4 MG: 2 GUM, CHEWING ORAL at 06:30

## 2017-05-17 RX ADMIN — HYDROXYZINE HYDROCHLORIDE 50 MG: 25 TABLET ORAL at 10:08

## 2017-05-17 RX ADMIN — OLANZAPINE 10 MG: 10 TABLET, FILM COATED ORAL at 10:53

## 2017-05-17 RX ADMIN — TRIFLUOPERAZINE HYDROCHLORIDE 2 MG: 2 TABLET, FILM COATED ORAL at 15:19

## 2017-05-17 RX ADMIN — Medication: at 18:39

## 2017-05-17 RX ADMIN — LORAZEPAM 0.5 MG: 0.5 TABLET ORAL at 08:18

## 2017-05-17 RX ADMIN — TRAZODONE HYDROCHLORIDE 50 MG: 50 TABLET ORAL at 22:12

## 2017-05-17 RX ADMIN — LORAZEPAM 0.5 MG: 0.5 TABLET ORAL at 18:12

## 2017-05-17 RX ADMIN — Medication: at 06:30

## 2017-05-17 RX ADMIN — IBUPROFEN 800 MG: 800 TABLET, FILM COATED ORAL at 12:22

## 2017-05-17 RX ADMIN — PRAZOSIN HYDROCHLORIDE 2 MG: 2 CAPSULE ORAL at 20:08

## 2017-05-17 ASSESSMENT — ACTIVITIES OF DAILY LIVING (ADL)
GROOMING: INDEPENDENT
DRESS: INDEPENDENT
HYGIENE/GROOMING: INDEPENDENT
LAUNDRY: WITH SUPERVISION
LAUNDRY: WITH SUPERVISION
ORAL_HYGIENE: INDEPENDENT
DRESS: INDEPENDENT
ORAL_HYGIENE: INDEPENDENT

## 2017-05-17 NOTE — PLAN OF CARE
Problem: Manic Symptoms  Intervention: Social and Therapeutic Interv (Manic Symptoms)     Pt attended OT clinic group, was able to initiate task and ask for help as needed. Pt demonstrated good planning, task focus, and problem solving. Appeared comfortable interacting with peers.  Worked quietly coloring at a side table, occasional socialization with peer.

## 2017-05-17 NOTE — PROGRESS NOTES
05/17/17 1200   Behavioral Health   Hallucinations denies / not responding to hallucinations   Thinking intact   Orientation person: oriented;place: oriented;date: oriented;time: oriented   Memory baseline memory   Insight poor   Judgement impaired   Eye Contact at examiner   Affect full range affect   Mood anxious;irritable;mood is calm   ADL Assessment (WDL) WDL   Suicidality (WDL) WDL   Self Injury (WDL) WDL   Activity other (see comment)  (Social in [place]leau)   Speech (WDL) WDL   Medication Sensitivity (WDL) WDL   Psychomotor Gait (WDL) WDL   Overt Agression (WDL) WDL   Psycho Education   Type of Intervention 1:1 intervention   Response participates, initiates socially appropriate   Hours 0.5   Treatment Detail Warning Signs   Activities of Daily Living   Hygiene/Grooming independent   Oral Hygiene independent   Dress independent   Laundry with supervision   Room Organization independent   patient requested check-in after being informed of her commitment. Between the comittment update and the check-in, patient hit a wall and severely injured a knuckle. This  talked with patient for a while to distract her from the situation, for which patient seemed quite grateful. patient denied any issues that she believes warrants said commitment.

## 2017-05-17 NOTE — PROGRESS NOTES
05/16/17 2121   Behavioral Health   Hallucinations denies / not responding to hallucinations   Thinking intact   Orientation person: oriented;place: oriented;date: oriented;time: oriented   Memory baseline memory   Insight poor   Judgement impaired   Eye Contact at examiner   Affect full range affect   Mood anxious;mood is calm   Physical Appearance/Attire attire appropriate to age and situation   Hygiene well groomed   Suicidality other (see comments)  (Denies)   Self Injury other (see comment)  (Denies)   Activity other (see comment)  (Attended group, present in milieu)   Speech clear;coherent   Medication Sensitivity no observed side effects;no stated side effects   Psychomotor / Gait balanced;steady   Psycho Education   Type of Intervention 1:1 intervention   Response participates, initiates socially appropriate   Hours 0.5   Treatment Detail Check in   Activities of Daily Living   Hygiene/Grooming independent   Oral Hygiene independent   Dress street clothes   Room Organization independent     Ayana was present in the milieu and engaged with peers throughout the evening. She stated she was anxious about the possibility of commitment but denied SI/SIB and depression. She is hoping to speak with her doctor and [facility] staff to gain further insight into the situation on 5/17/2017. Pt was well groomed and did laundry during the shift.

## 2017-05-17 NOTE — PROGRESS NOTES
I received a call from [employer] and they are supporting this case for commitment. Edilma Skaggs asked me to fax a MAR and I did so.  I received the court order and pt is now on a court hold.  Preliminary hearing is May 19 at 9AM.  Commitment hearing is May 30, 2017 at 10:30AM.  A substitute decision maker was ordered - Cuco Brian - [phone].  The order looks like they are petitioning for commitment only to the Commissioner and does not have [facility] on the petition.

## 2017-05-17 NOTE — PLAN OF CARE
"Problem: Manic Symptoms  Goal: Manic Symptoms  Signs and symptoms of listed problems will be absent or manageable.   Outcome: Therapy, progress toward functional goals is gradual  Pt asked writer about status of petition for commitment. Writer informed pt that petition was supported and that pt would be on a court hold sometime today. Writer explained process would try least restrictive interventions as possible. Pt would not be able to be discharged today. Writer left lounge appearing upset. This was at 1050. At 1100, pt returned to desk stating \"I think I broke my hand again\". asked if she punched a wall she stated yes. I broke it once before this way\". \"Stupid\".  Writer provided ice pack. And incident reported to physician. Noted swelling right hand middle digits with bruising. Pt reports pain with movement and to touch. Called to xray to facilitate portable xray.           "

## 2017-05-18 PROCEDURE — 12400001 ZZH R&B MH UMMC

## 2017-05-18 PROCEDURE — 25000125 ZZHC RX 250: Performed by: PSYCHIATRY & NEUROLOGY

## 2017-05-18 PROCEDURE — 25000132 ZZH RX MED GY IP 250 OP 250 PS 637: Performed by: PSYCHIATRY & NEUROLOGY

## 2017-05-18 PROCEDURE — 99232 SBSQ HOSP IP/OBS MODERATE 35: CPT | Performed by: PSYCHIATRY & NEUROLOGY

## 2017-05-18 PROCEDURE — 90853 GROUP PSYCHOTHERAPY: CPT

## 2017-05-18 RX ORDER — PROPRANOLOL HYDROCHLORIDE 20 MG/1
20 TABLET ORAL ONCE
Status: COMPLETED | OUTPATIENT
Start: 2017-05-18 | End: 2017-05-18

## 2017-05-18 RX ORDER — TRAZODONE HYDROCHLORIDE 100 MG/1
100 TABLET ORAL
Status: DISCONTINUED | OUTPATIENT
Start: 2017-05-18 | End: 2017-05-30

## 2017-05-18 RX ADMIN — LORAZEPAM 0.5 MG: 0.5 TABLET ORAL at 09:50

## 2017-05-18 RX ADMIN — PRAZOSIN HYDROCHLORIDE 2 MG: 2 CAPSULE ORAL at 20:38

## 2017-05-18 RX ADMIN — PROPRANOLOL HYDROCHLORIDE 20 MG: 20 TABLET ORAL at 16:30

## 2017-05-18 RX ADMIN — NICOTINE POLACRILEX 4 MG: 2 GUM, CHEWING ORAL at 07:58

## 2017-05-18 RX ADMIN — TRIFLUOPERAZINE HYDROCHLORIDE 2 MG: 2 TABLET, FILM COATED ORAL at 15:07

## 2017-05-18 RX ADMIN — TRIFLUOPERAZINE HYDROCHLORIDE 2 MG: 2 TABLET, FILM COATED ORAL at 07:58

## 2017-05-18 RX ADMIN — NICOTINE POLACRILEX 4 MG: 2 GUM, CHEWING ORAL at 18:21

## 2017-05-18 RX ADMIN — NICOTINE POLACRILEX 4 MG: 2 GUM, CHEWING ORAL at 16:30

## 2017-05-18 RX ADMIN — IBUPROFEN 800 MG: 800 TABLET, FILM COATED ORAL at 07:58

## 2017-05-18 RX ADMIN — LORAZEPAM 0.5 MG: 0.5 TABLET ORAL at 20:38

## 2017-05-18 RX ADMIN — ONDANSETRON 4 MG: 4 TABLET, ORALLY DISINTEGRATING ORAL at 17:56

## 2017-05-18 RX ADMIN — TRAZODONE HYDROCHLORIDE 100 MG: 100 TABLET ORAL at 21:44

## 2017-05-18 RX ADMIN — TRIFLUOPERAZINE HYDROCHLORIDE 2 MG: 2 TABLET, FILM COATED ORAL at 20:38

## 2017-05-18 ASSESSMENT — ACTIVITIES OF DAILY LIVING (ADL)
LAUNDRY: WITH SUPERVISION
DRESS: INDEPENDENT;STREET CLOTHES
GROOMING: INDEPENDENT
ORAL_HYGIENE: INDEPENDENT
LAUNDRY: WITH SUPERVISION
DRESS: INDEPENDENT
ORAL_HYGIENE: INDEPENDENT
HYGIENE/GROOMING: INDEPENDENT

## 2017-05-18 NOTE — PROGRESS NOTES
"   05/18/17 1600   Psycho Education   Type of Intervention structured groups  (Mental Health Group)   Response participates with cues/redirection   Hours 0.5   Treatment Detail IMR 7: Reducing Relapses   Patient participated in afternoon mental health group with unit [facility], Christie Rodriguez. Patient checked in to group as \"restless\".  As the group begin, patient asked the question \"what if none of this pertains to someone?\"  Facilitator asked patient to clarify.  Patient stated that she did not think that her symptoms were \"severe\" enough, stating, \"I wasn't as out of it as they reported.\"  When asked, patient stated that she believed her symptoms were on the milder end of the spectrum.  After that she added very little to the discussion.  She was able to identify that not getting enough sleep would lead to a relapse. Patient was unable to recognize her \"restlessness\" as a sign.      Patient had a difficulty time managing her body.  She was moving in her chair a lot, turning around looking out the window, bouncing her legs, she appeared as how she checked in \"restless\". Patient had poor concentration as she had asked facilitator to repeat the instructions at least two different times in the 30 minutes that she was in group.    "

## 2017-05-18 NOTE — PROGRESS NOTES
Pt participated in OT clinic and was able to initiate task, follow through with plan and ask for help as needed.    She self-initiated two projects, completing the first and beginning a second.  Good attention to detail, planning and organized.  Mildly social with peers and staff.

## 2017-05-18 NOTE — PROGRESS NOTES
"[facility], [place]   Psychiatric Progress Note         Interim History and Subjective Reports:   The patient's care was discussed with the treatment team during the daily team meeting.  Staff reports:  No acute issues    She was in her room having a pleasant conversation with her roommate.  She feels better today. Anxiety is less and paranoia is less. She attributed this to medications and expressed her appreciation.    She complained of feeling highly restless and needing to walk around.   Sleep was interrupted last night however not from nightmares.     We reviewed the commitment process and what this would entail. She seemed to find the discussion helpful and alleviated some anxiety.    Patient denies SI/HI.  She denies AH/VH.          Scheduled Medications:       prazosin  2 mg Oral At Bedtime     trifluoperazine  2 mg Oral TID          Allergies:      Allergies   Allergen Reactions     Adhesive Tape Hives     Droperidol Anxiety          Labs:     No results found for this or any previous visit (from the past 24 hour(s)).       Vitals:   /85  Pulse 96  Temp 97.5  F (36.4  C)  Resp 16  Ht 1.676 m (5' 6\")  Wt 90.7 kg (200 lb)  BMI 32.28 kg/m2  Weight: 200 lbs 0 oz          Height: 5' 6\"           Body mass index is 32.28 kg/(m^2).        Mental Status Examination:   Appearance: awake, alert and adequately groomed  Attitude:  guarded, less  Eye Contact:  poor   Mood:  anxious and better  Affect:  intensity is blunted  Speech:  clear, coherent  Psychomotor Behavior:  no evidence of tardive dyskinesia, dystonia, or tics. Sat calmly today.   Throught Process:  linear  Associations:  no loose associations  Thought Content:  no evidence of suicidal ideation or homicidal ideation and Paranoia was evident however less intense   Insight:  partial  Judgement:  limited  Oriented to:  time, person, and place  Attention Span and Concentration:  limited  Recent and Remote Memory:  " intact         DIagnoses:     1. Bipolar disorder type 1 - manic episode with psychosis      Rule out Schizoaffective disorder - bipolar type  2. PTSD  3. Suspect borderline personality disorder.   4. History of Marijuana use disorder.   5. History of opiate use disorder         Plan:   1. Biological treatments:  --Continue stelazine TID for psychosis/anxiety; tolerating well. Prn ativan is available while in the hospital.   -- Trial of propranolol one time today as we question the possibility of akathisia contributing to restlessness.  -- We reviewed her hand x-ray; No fracture reported on the official report  -- Continue prazosin for PTSD related nightmares; risks reviewed. BP and heart rate should tolerate this addition.   -- increased trazodone for insomnia    2. Psychosocial treatments:   --addressed with SW consult and groups  -- psychology consult for neuropsychological testing for diagnostic clarification    3. Dispo:  --72 hour hold; expecting a court hold by the end of the day.

## 2017-05-18 NOTE — PROGRESS NOTES
"[facility], [place]   Psychiatric Progress Note         Interim History and Subjective Reports:   The patient's care was discussed with the treatment team during the daily team meeting.  Staff reports: taking medications, eating meals, attending some groups. She became upset after learning that her position was supported. She went back to her room and punched the wall. She then told staff what had occurred resulting in some injury to her hand. She was accepting of ice packs, ibuprofen, and an x-ray.    She feels better today. Anxiety is less and paranoia is less. She attributed this to medications and expressed her appreciation.      She slept very well last night with the addition of prazosin.     We reviewed the commitment process and what this would entail. She seemed to find the discussion helpful and alleviated some anxiety.    Patient denies SI/HI.  She denies AH/VH.          Scheduled Medications:       prazosin  2 mg Oral At Bedtime     trifluoperazine  2 mg Oral TID          Allergies:      Allergies   Allergen Reactions     Adhesive Tape Hives     Droperidol Anxiety          Labs:     No results found for this or any previous visit (from the past 24 hour(s)).       Vitals:   /85  Pulse 96  Temp 97.3  F (36.3  C) (Oral)  Resp 16  Ht 1.676 m (5' 6\")  Wt 88 kg (194 lb)  BMI 31.31 kg/m2  Weight: 194 lbs 0 oz          Height: 5' 6\"           Body mass index is 31.31 kg/(m^2).        Mental Status Examination:   Appearance: awake, alert and adequately groomed  Attitude:  guarded, less  Eye Contact:  poor   Mood:  anxious and better  Affect:  intensity is blunted  Speech:  clear, coherent  Psychomotor Behavior:  no evidence of tardive dyskinesia, dystonia, or tics. Sat calmly today.   Throught Process:  linear  Associations:  no loose associations  Thought Content:  no evidence of suicidal ideation or homicidal ideation and Paranoia was evident however less intense "   Insight:  partial  Judgement:  limited  Oriented to:  time, person, and place  Attention Span and Concentration:  limited  Recent and Remote Memory:  intact         DIagnoses:     1. Bipolar disorder type 1 - manic episode with psychosis      Rule out Schizoaffective disorder - bipolar type  2. PTSD  3. History of borderline personality disorder.   4. History of Marijuana use disorder.   5. History of opiate use disorder         Plan:   1. Biological treatments:  --Continue stelazine TID for psychosis/anxiety; tolerating well. Prn ativan is available while in the hospital.   -- We reviewed her hand x-ray today, no clear fracture however awaiting final review of the radiologist  -- Continue prazosin for PTSD related nightmares; risks reviewed. BP and heart rate should tolerate this addition.     2. Psychosocial treatments:   --addressed with SW consult and groups  -- psychology consult for neuropsychological testing for diagnostic clarification    3. Dispo:  --72 hour hold; expecting a court hold by the end of the day.

## 2017-05-18 NOTE — PROGRESS NOTES
I met with the pt and gave her a copy of the order with the commitment hearing dates. I  Gave her as much information as I had about the process. I told her she arely learn more tomorrow. I said there would be a worker named Sergio Khoury -[phone] that she would meet there tomorrow. Sergio and I talked about possibly a stay of commitment and having pt go to an IRTS.  Pt signed VAL for parents and I left a voice mail for mother.

## 2017-05-18 NOTE — PLAN OF CARE
Problem: Manic Symptoms  Goal: Manic Symptoms  Signs and symptoms of listed problems will be absent or manageable.   Outcome: Improving  After dealing with her frustration about the commitment process by punching the wall, she had a better evening. She participated well in group with a calm, cooperative affect. She agreed that she can stay calm and in control of her behaviors for the next week leading up to court. She seems motivated to do this yet expressed frustration with the commitment process. She denied SI and said she is very satisfied with her attending Psychiatrist. She is interested in further neuropsych testing. She took PRN for anxiety. Hand XR negative.

## 2017-05-18 NOTE — PROGRESS NOTES
05/18/17 1400   Behavioral Health   Hallucinations denies / not responding to hallucinations   Thinking intact   Orientation person: oriented;place: oriented;date: oriented;time: oriented   Memory baseline memory   Insight poor   Judgement impaired   Eye Contact at examiner   Affect full range affect   Mood mood is calm   ADL Assessment (WDL) WDL   Suicidality (WDL) WDL   Self Injury (WDL) WDL   Activity other (see comment)  (Social with peers)   Speech (WDL) WDL   Medication Sensitivity (WDL) WDL   Psychomotor Gait (WDL) WDL   Overt Agression (WDL) WDL   Psycho Education   Type of Intervention 1:1 intervention   Response participates, initiates socially appropriate   Hours 0.5   Treatment Detail Feedback   Activities of Daily Living   Hygiene/Grooming independent   Oral Hygiene independent   Dress independent   Laundry with supervision   Room Organization independent   Tried pretty hard to stay busy to avoid feeling cramped up, which worked to some extent. No symptoms seen or stated.

## 2017-05-19 PROCEDURE — 97150 GROUP THERAPEUTIC PROCEDURES: CPT | Mod: GO

## 2017-05-19 PROCEDURE — 12400001 ZZH R&B MH UMMC

## 2017-05-19 PROCEDURE — 25000132 ZZH RX MED GY IP 250 OP 250 PS 637: Performed by: PSYCHIATRY & NEUROLOGY

## 2017-05-19 RX ADMIN — OLANZAPINE 10 MG: 10 TABLET, FILM COATED ORAL at 18:48

## 2017-05-19 RX ADMIN — HYDROXYZINE HYDROCHLORIDE 50 MG: 25 TABLET ORAL at 11:00

## 2017-05-19 RX ADMIN — NICOTINE POLACRILEX 4 MG: 2 GUM, CHEWING ORAL at 12:37

## 2017-05-19 RX ADMIN — TRAZODONE HYDROCHLORIDE 100 MG: 100 TABLET ORAL at 20:57

## 2017-05-19 RX ADMIN — TRIFLUOPERAZINE HYDROCHLORIDE 2 MG: 2 TABLET, FILM COATED ORAL at 14:40

## 2017-05-19 RX ADMIN — IBUPROFEN 800 MG: 800 TABLET, FILM COATED ORAL at 12:36

## 2017-05-19 RX ADMIN — NICOTINE POLACRILEX 4 MG: 2 GUM, CHEWING ORAL at 08:19

## 2017-05-19 RX ADMIN — LORAZEPAM 0.5 MG: 0.5 TABLET ORAL at 07:01

## 2017-05-19 RX ADMIN — TRAZODONE HYDROCHLORIDE 100 MG: 100 TABLET ORAL at 19:53

## 2017-05-19 RX ADMIN — TRIFLUOPERAZINE HYDROCHLORIDE 2 MG: 2 TABLET, FILM COATED ORAL at 07:00

## 2017-05-19 RX ADMIN — PRAZOSIN HYDROCHLORIDE 2 MG: 2 CAPSULE ORAL at 19:53

## 2017-05-19 RX ADMIN — NICOTINE POLACRILEX 4 MG: 2 GUM, CHEWING ORAL at 14:41

## 2017-05-19 RX ADMIN — NICOTINE POLACRILEX 4 MG: 2 GUM, CHEWING ORAL at 07:01

## 2017-05-19 RX ADMIN — ACETAMINOPHEN 650 MG: 325 TABLET, FILM COATED ORAL at 16:07

## 2017-05-19 RX ADMIN — LORAZEPAM 0.5 MG: 0.5 TABLET ORAL at 11:00

## 2017-05-19 RX ADMIN — TRIFLUOPERAZINE HYDROCHLORIDE 2 MG: 2 TABLET, FILM COATED ORAL at 19:53

## 2017-05-19 ASSESSMENT — ACTIVITIES OF DAILY LIVING (ADL)
LAUNDRY: WITH SUPERVISION
DRESS: STREET CLOTHES
LAUNDRY: WITH SUPERVISION
ORAL_HYGIENE: INDEPENDENT
DRESS: INDEPENDENT
GROOMING: INDEPENDENT
GROOMING: INDEPENDENT
ORAL_HYGIENE: INDEPENDENT

## 2017-05-19 NOTE — PROGRESS NOTES
"  Pt attended court today. Pt remains on a court hold.  \"It is ordered that: The respondent shall continue to be confined at [facility] or other consenting facility pending the Committment Hearing on May 30, [address]67 at 10:30AM.\"    I spoke with the court . She said that the examiner felt that the pt was  Not a candidate for a stay of commitment but was more likely a candidate for a full commitment with forced neuroleptic order. Court examiner says that the pt is actively hallucinating. The pt reports that she has been clean from drugs since her treatment at [facility] last summer. Pt paced around the table throughout the entire interview. Pt then did say she uses LSD once a month. Pt stated she would not take meds when she leaves the hospital. Pt told the miguel examiner that if she left she would keep walking. Court examiner feels that pt like to be manic. The pt reported that she is frustrated that she is eating and sleeping. Pt reported god is making her walk. Pt did not have a plan.  The court  is going to look for an IRTS for the pt.  The pre-petition report came today and team members should read this for more information on the pt. There are many police reports cited in there.    I called mother. She said that the pt was last at their house last Friday. Pt has been at a friends home. Pt does not take her medications, gets a medical problem and loses her job. This is her pattern. Pt does not ever finish the therapy she needs and this includes the physical therapy. Pt does not eat, her blood sugars drop and she does not take care of herself. I gave mother my number and the court 's number and she appreciates this.  "

## 2017-05-19 NOTE — PROGRESS NOTES
Patient arrived back from court saying things did not go well. Requested prn Ativan and Vistaril.

## 2017-05-19 NOTE — PROGRESS NOTES
"    Pt went to court today. Reports that she doesn't want to be committed but admits that she \"walked for days,\" \"I felt suffocated.\" Pt reports that she was walking around and \"didn't prepare well.\" \"If I went home today, I would probably go walking again.\" Pt reports that she was \"probably paranoid and delusional.\" The patient appears to have partial insight into how she was behaving and how dangerous it was but also reported that she would probably go out into the community and do that same behaviors. Pt appears calm, mostly organized. Denies SI/SIB. Eating. Socializing.   "

## 2017-05-19 NOTE — PROGRESS NOTES
"   05/18/17 2244   Behavioral Health   Hallucinations denies / not responding to hallucinations   Thinking intact   Orientation time: oriented;date: oriented;place: oriented;person: oriented   Memory baseline memory   Insight poor   Judgement impaired   Eye Contact at examiner   Affect full range affect   Mood anxious   Physical Appearance/Attire neat;attire appropriate to age and situation;appears stated age   Hygiene well groomed   Suicidality other (see comments)  (None Stated)   Self Injury other (see comment)  (None Stated)   Activity other (see comment)  (social with others, test, check in, movies )   Speech clear;coherent   Medication Sensitivity no stated side effects;no observed side effects   Psychomotor / Gait steady;balanced     Pt.'s goal was to finish tests. Pt.'s discharge plan is court tomorrow to determine commitment. Pt.'s IMR of Feedback for supportive person was \"I suppose.\" Pt. Attended and participated in groups. Pt. Was social with others and took a test for the doctor. Pt. Appeared anxious at times. Pt. Stated feeling \"worried.\" Pt.'s concerns was \"would like to know if she can bring phone to court.\" Pt. Had no stated SI or SIB.  "

## 2017-05-19 NOTE — PROGRESS NOTES
"   05/19/17 1035   Behavioral Health   Hallucinations denies / not responding to hallucinations   Thinking delusional;paranoid;other (see comment)  (confirms that she \"may have been delusional and paranoid\")   Orientation person: oriented;place: oriented;date: oriented;time: oriented   Memory other (see comment)  (appears baseline)   Insight other (see comment)  (fair:admits but having difficulty accepting it)   Judgement impaired   Eye Contact at examiner   Affect blunted, flat   Mood anxious   Physical Appearance/Attire neat   Hygiene well groomed   Suicidality other (see comments)  (denies)   Self Injury other (see comment)  (denies)   Activity other (see comment)  (court today, in milieu, taking MMPI)   Speech clear;coherent   Medication Sensitivity no stated side effects;no observed side effects   Psychomotor / Gait balanced;steady   Activities of Daily Living   Hygiene/Grooming independent   Oral Hygiene independent   Dress independent   Laundry with supervision   Room Organization independent     "
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
## 2017-05-20 PROCEDURE — 90853 GROUP PSYCHOTHERAPY: CPT

## 2017-05-20 PROCEDURE — 25000132 ZZH RX MED GY IP 250 OP 250 PS 637: Performed by: PSYCHIATRY & NEUROLOGY

## 2017-05-20 PROCEDURE — 12400001 ZZH R&B MH UMMC

## 2017-05-20 RX ADMIN — NICOTINE POLACRILEX 2 MG: 2 GUM, CHEWING ORAL at 16:28

## 2017-05-20 RX ADMIN — TRIFLUOPERAZINE HYDROCHLORIDE 2 MG: 2 TABLET, FILM COATED ORAL at 13:57

## 2017-05-20 RX ADMIN — NICOTINE POLACRILEX 2 MG: 2 GUM, CHEWING ORAL at 20:27

## 2017-05-20 RX ADMIN — HYDROXYZINE HYDROCHLORIDE 50 MG: 25 TABLET ORAL at 19:31

## 2017-05-20 RX ADMIN — NICOTINE POLACRILEX 4 MG: 2 GUM, CHEWING ORAL at 09:36

## 2017-05-20 RX ADMIN — TRIFLUOPERAZINE HYDROCHLORIDE 2 MG: 2 TABLET, FILM COATED ORAL at 09:34

## 2017-05-20 RX ADMIN — LORAZEPAM 0.5 MG: 0.5 TABLET ORAL at 18:10

## 2017-05-20 RX ADMIN — NICOTINE POLACRILEX 2 MG: 2 GUM, CHEWING ORAL at 18:10

## 2017-05-20 RX ADMIN — TRAZODONE HYDROCHLORIDE 100 MG: 100 TABLET ORAL at 21:05

## 2017-05-20 RX ADMIN — LORAZEPAM 0.5 MG: 0.5 TABLET ORAL at 11:13

## 2017-05-20 RX ADMIN — PRAZOSIN HYDROCHLORIDE 2 MG: 2 CAPSULE ORAL at 20:48

## 2017-05-20 RX ADMIN — TRIFLUOPERAZINE HYDROCHLORIDE 2 MG: 2 TABLET, FILM COATED ORAL at 20:49

## 2017-05-20 RX ADMIN — NICOTINE POLACRILEX 4 MG: 2 GUM, CHEWING ORAL at 08:22

## 2017-05-20 ASSESSMENT — ACTIVITIES OF DAILY LIVING (ADL)
ORAL_HYGIENE: INDEPENDENT
DRESS: INDEPENDENT
GROOMING: INDEPENDENT
LAUNDRY: WITH SUPERVISION
DRESS: INDEPENDENT
LAUNDRY: UNABLE TO COMPLETE
GROOMING: INDEPENDENT
ORAL_HYGIENE: INDEPENDENT

## 2017-05-20 NOTE — PROGRESS NOTES
05/20/17 1023   Behavioral Health   Hallucinations denies / not responding to hallucinations   Thinking other (see comment)  (no paranoid statements, difficult to discern)   Orientation person: oriented;place: oriented;date: oriented;time: oriented   Memory baseline memory   Insight poor   Judgement impaired   Eye Contact at examiner   Affect full range affect   Mood anxious   Physical Appearance/Attire neat   Hygiene well groomed   Suicidality other (see comments)  (denies)   Self Injury other (see comment)  (denies)   Activity other (see comment)  (in milieu mostly, on phone, in grps, social)   Speech clear;coherent   Medication Sensitivity no stated side effects;no observed side effects   Psychomotor / Gait balanced;steady   Psycho Education   Type of Intervention structured groups   Response participates with cues/redirection   Hours 0.5   Treatment Detail ways of coping   Activities of Daily Living   Hygiene/Grooming independent   Oral Hygiene independent   Dress independent   Laundry with supervision   Room Organization independent

## 2017-05-20 NOTE — PROGRESS NOTES
Pt approached writer with c/o dizziness/light headedness after taking her one scheduled medication this morning. Reports had never felt this way before. /80, P 106, and O2 sats at 98%. Pt denies any chest discomfort, or nausea. Instructed pt to sit for a while, increase fluid intake and to change positions slowly. After about 5 mins, pt reports feeling much better and was able to resume OT activities without discomfort. Nursing will monitor and update if needed.

## 2017-05-21 PROCEDURE — 25000132 ZZH RX MED GY IP 250 OP 250 PS 637: Performed by: PSYCHIATRY & NEUROLOGY

## 2017-05-21 PROCEDURE — 12400001 ZZH R&B MH UMMC

## 2017-05-21 PROCEDURE — 25000125 ZZHC RX 250: Performed by: PSYCHIATRY & NEUROLOGY

## 2017-05-21 RX ADMIN — TRAZODONE HYDROCHLORIDE 100 MG: 100 TABLET ORAL at 21:07

## 2017-05-21 RX ADMIN — NICOTINE POLACRILEX 4 MG: 2 GUM, CHEWING ORAL at 08:19

## 2017-05-21 RX ADMIN — ONDANSETRON 4 MG: 4 TABLET, ORALLY DISINTEGRATING ORAL at 09:02

## 2017-05-21 RX ADMIN — HYDROXYZINE HYDROCHLORIDE 50 MG: 25 TABLET ORAL at 10:07

## 2017-05-21 RX ADMIN — NICOTINE POLACRILEX 2 MG: 2 GUM, CHEWING ORAL at 16:20

## 2017-05-21 RX ADMIN — OLANZAPINE 10 MG: 10 TABLET, FILM COATED ORAL at 19:29

## 2017-05-21 RX ADMIN — TRIFLUOPERAZINE HYDROCHLORIDE 2 MG: 2 TABLET, FILM COATED ORAL at 19:39

## 2017-05-21 RX ADMIN — NICOTINE POLACRILEX 2 MG: 2 GUM, CHEWING ORAL at 17:10

## 2017-05-21 RX ADMIN — HYDROXYZINE HYDROCHLORIDE 50 MG: 25 TABLET ORAL at 17:09

## 2017-05-21 RX ADMIN — LORAZEPAM 0.5 MG: 0.5 TABLET ORAL at 11:59

## 2017-05-21 RX ADMIN — NICOTINE POLACRILEX 2 MG: 2 GUM, CHEWING ORAL at 19:38

## 2017-05-21 RX ADMIN — TRIFLUOPERAZINE HYDROCHLORIDE 2 MG: 2 TABLET, FILM COATED ORAL at 08:18

## 2017-05-21 RX ADMIN — PRAZOSIN HYDROCHLORIDE 2 MG: 2 CAPSULE ORAL at 21:06

## 2017-05-21 RX ADMIN — NICOTINE POLACRILEX 4 MG: 2 GUM, CHEWING ORAL at 11:00

## 2017-05-21 RX ADMIN — TRIFLUOPERAZINE HYDROCHLORIDE 2 MG: 2 TABLET, FILM COATED ORAL at 13:55

## 2017-05-21 RX ADMIN — NICOTINE POLACRILEX 4 MG: 2 GUM, CHEWING ORAL at 13:55

## 2017-05-21 RX ADMIN — LORAZEPAM 0.5 MG: 0.5 TABLET ORAL at 17:09

## 2017-05-21 ASSESSMENT — ACTIVITIES OF DAILY LIVING (ADL)
ORAL_HYGIENE: INDEPENDENT
LAUNDRY: WITH SUPERVISION
DRESS: STREET CLOTHES;SCRUBS (BEHAVIORAL HEALTH);INDEPENDENT
LAUNDRY: WITH SUPERVISION
DRESS: STREET CLOTHES
GROOMING: HANDWASHING;SHOWER;INDEPENDENT
GROOMING: INDEPENDENT
ORAL_HYGIENE: INDEPENDENT

## 2017-05-21 NOTE — PLAN OF CARE
"Problem: Manic Symptoms  Goal: Manic Symptoms  Signs and symptoms of listed problems will be absent or manageable.   Patient will report less lability of mood  Patient will maintain appropriate boundaries with peers and staff  Patient speech will be notable for appropriate content, rate, and volume  Patient will report less difficulty falling and staying asleep  Outcome: Ivone Piña has been constantly moving this shift. Ayana reported to this writer early in the shift that \"A friend brought 18 red bull energy drinks to her this afternoon during visiting hours and she drank 3 of them by 3PM.\"  Has been requesting some more and has been denied all evening. Patient's pulse at  Pm was as she was asking for nicotine gum.(Another stimulant) She has a soft foam ball that she has been bouncing around the department and has requested Multiple prn's all night to decrease her anxiety. Patient has been encouraged to limit her intake of \"Red BUll\" energy drinks to one or two a day as the consumption of these appears to harming her rather than helping her. Patient's voice has been loud and speech has been fast and pressured this evening. Pat has been sarcastic all shift and laughing inappropriately. Patient denies SI/SIB/HI/AH/VH      "

## 2017-05-21 NOTE — PLAN OF CARE
Problem: General Plan of Care (Inpatient Behavioral)  Goal: Individualization/Patient Specific Goal (IP Behavioral)  The patient and/or their representative will achieve their patient-specific goals related to the plan of care.    The patient-specific goals include:  Illness Management Recovery model: Objectives    Patient will identify reason(s) for hospitalization from their perspective.  Patient will identify a minimum of three goals for discharge.  Patient will identify a minimum of three triggers that may increase their symptoms.  Patient will identify a minimum of three coping skills they can do to stay well.   Patient will identify their support system to demonstrate readiness for discharge.   Outcome: Therapy, progress toward functional goals is gradual  Illness Management Recovery model:  Self-Reflection & Planning.     Assessed patient's progress completing forms related to Illness Management Recovery (including Personal Plan of Care, Adult Coping Plan, and My Support and Coping Plan) and assisted as needed.     Encouraged patient to continue to consider triggers, wellness strategies, early warning signs, feedback from others, actions to take to prevent relapse, and coping strategies as part of a plan to remain well after leaving the hospital.

## 2017-05-21 NOTE — PLAN OF CARE
Problem: Manic Symptoms  Goal: Manic Symptoms  Signs and symptoms of listed problems will be absent or manageable.   Patient will report less lability of mood  Patient will maintain appropriate boundaries with peers and staff  Patient speech will be notable for appropriate content, rate, and volume  Patient will report less difficulty falling and staying asleep   Outcome: Therapy, progress toward functional goals is gradual  Pt out in common areas most of shift. Pt became angry with staff after finding many of her belongings removed. Lighter found in cup filled with magic markers in room. Pt asking for multiple Red Bull energy drinks. Writer limited to 1 serving. Discussed sx's pt reporting ie.. Restlessness anxiety and that energy drinks will exacerbate these sx's that we are giving other medications to relieve. Pt appreciated rational, but still likes Red Bull. Continues to ask for it. No suicidal thoughts. Pt asking about court process anxious to discharge.

## 2017-05-22 PROCEDURE — 25000132 ZZH RX MED GY IP 250 OP 250 PS 637: Performed by: PSYCHIATRY & NEUROLOGY

## 2017-05-22 PROCEDURE — 12400001 ZZH R&B MH UMMC

## 2017-05-22 PROCEDURE — 96103 ZZHC PSYCH TEST BY COMP, MCMI-3 PROFILE: CPT

## 2017-05-22 PROCEDURE — 96103 ZZHC PSYCH TEST BY COMP, MMPI-2 PROFILE: CPT

## 2017-05-22 PROCEDURE — 99232 SBSQ HOSP IP/OBS MODERATE 35: CPT | Performed by: PSYCHIATRY & NEUROLOGY

## 2017-05-22 RX ADMIN — NICOTINE POLACRILEX 4 MG: 2 GUM, CHEWING ORAL at 13:21

## 2017-05-22 RX ADMIN — OLANZAPINE 10 MG: 10 TABLET, FILM COATED ORAL at 21:45

## 2017-05-22 RX ADMIN — TRIFLUOPERAZINE HYDROCHLORIDE 2 MG: 2 TABLET, FILM COATED ORAL at 08:17

## 2017-05-22 RX ADMIN — IBUPROFEN 800 MG: 800 TABLET, FILM COATED ORAL at 10:35

## 2017-05-22 RX ADMIN — TRIFLUOPERAZINE HYDROCHLORIDE 2 MG: 2 TABLET, FILM COATED ORAL at 14:00

## 2017-05-22 RX ADMIN — TRAZODONE HYDROCHLORIDE 100 MG: 100 TABLET ORAL at 21:34

## 2017-05-22 RX ADMIN — TRAZODONE HYDROCHLORIDE 100 MG: 100 TABLET ORAL at 22:36

## 2017-05-22 RX ADMIN — LORAZEPAM 0.5 MG: 0.5 TABLET ORAL at 14:01

## 2017-05-22 RX ADMIN — PRAZOSIN HYDROCHLORIDE 2 MG: 2 CAPSULE ORAL at 21:34

## 2017-05-22 RX ADMIN — LORAZEPAM 0.5 MG: 0.5 TABLET ORAL at 18:40

## 2017-05-22 RX ADMIN — NICOTINE POLACRILEX 4 MG: 2 GUM, CHEWING ORAL at 08:17

## 2017-05-22 RX ADMIN — NICOTINE POLACRILEX 4 MG: 2 GUM, CHEWING ORAL at 10:35

## 2017-05-22 RX ADMIN — NICOTINE POLACRILEX 4 MG: 2 GUM, CHEWING ORAL at 16:29

## 2017-05-22 ASSESSMENT — ACTIVITIES OF DAILY LIVING (ADL)
ORAL_HYGIENE: INDEPENDENT
DRESS: INDEPENDENT
LAUNDRY: WITH SUPERVISION
GROOMING: INDEPENDENT
GROOMING: INDEPENDENT
DRESS: INDEPENDENT
ORAL_HYGIENE: INDEPENDENT
LAUNDRY: WITH SUPERVISION

## 2017-05-22 NOTE — PLAN OF CARE
Problem: Manic Symptoms  Goal: Social and Therapeutic (Manic Symptoms)  Signs and symptoms of listed problems will be absent or manageable.         Pt. Attended 1 of 3 scheduled OT sessions today. Pt. Actively participated in goal directed task session. Pt responded to frustration with a structured task with a loud outburst and left the room.  Pt returned shortly and able to listen to a peer's suggestion and problem solving.  Thanked the peer and affect became bright as Pt was successful with task.  Able to process through the episode of frustration.  Responsive to gentle teasing.

## 2017-05-22 NOTE — PROGRESS NOTES
"Pt was seen today by the substitute decision maker - Cuco Torres.  He left 2 pieces of paper as documentation:  \"Report of Substitute Decision Maker\"  \"Substituted Consent to Administration of Neuroleptic Medication\"  I placed a copy to be scanned, a copy in the chart and gave a copy to the MD.  Mr. Torres agrees to treatment of the neuroleptics but \"If the patient should refuse such treatment, you may not administer neuroleptic medication unless authorized to do so by the court,etc.\"    "

## 2017-05-22 NOTE — PROGRESS NOTES
"[facility], [place]   Psychiatric Progress Note         Interim History and Subjective Reports:   The patient's care was discussed with the treatment team during the daily team meeting.  Staff reports:  No acute issues    She denied depressed mood.  She reported excess of energy, feeling restless, racing thoughts, difficulty sleeping. She reports feeling excessively happy today and the smiling.     She inquired regarding the neuropsychological testing results.    Patient denies SI/HI.  She denies AH/VH.    Paranoia is mild and tolerable.         Scheduled Medications:       prazosin  2 mg Oral At Bedtime     trifluoperazine  2 mg Oral TID          Allergies:      Allergies   Allergen Reactions     Adhesive Tape Hives     Prednisone Other (See Comments) and Hives     Suicidal ideation     Droperidol Anxiety          Labs:     No results found for this or any previous visit (from the past 24 hour(s)).       Vitals:   /90  Pulse 89  Temp 97.1  F (36.2  C)  Resp 16  Ht 1.676 m (5' 6\")  Wt 90.7 kg (200 lb)  SpO2 96%  BMI 32.28 kg/m2  Weight: 200 lbs 0 oz          Height: 5' 6\"           Body mass index is 32.28 kg/(m^2).        Mental Status Examination:   Appearance: awake, alert and adequately groomed  Attitude:  guarded, less  Eye Contact:  poor   Mood:  anxious and better  Affect:  intensity is heightened  Speech:  Rapid  Psychomotor Behavior:  Appeared restless. Sat calmly today.   Throught Process:  linear  Associations:  no loose associations  Thought Content:  no evidence of suicidal ideation or homicidal ideation and Paranoia was evident however less intense   Insight:  partial  Judgement:  limited  Oriented to:  time, person, and place  Attention Span and Concentration:  limited  Recent and Remote Memory:  intact         DIagnoses:     1. Schizoaffective disorder - bipolar type  2. PTSD  3. Suspect borderline personality disorder.   4. History of Marijuana use disorder. "   5. History of opiate use disorder         Plan:   1. Biological treatments:  --Continue stelazine TID for psychosis/anxiety; tolerating well. Prn ativan is available while in the hospital.   -- Add lithium for mood stabilization; risks and benefits reviewed  -- Continue prazosin for PTSD related nightmares; risks reviewed. BP and heart rate should tolerate this addition.   -- Encouraged her to limit caffeine intake to no more than two beverages a day    2. Psychosocial treatments:   --addressed with SW consult and groups  -- psychology consult for neuropsychological testing completed: I reviewed the results with her today and she was appreciative. Provide educational handouts on her diagnosis tomorrow  --agreeable for IRTS referral     3. Dispo:  --court hold, awaiting hearing

## 2017-05-22 NOTE — PROGRESS NOTES
"Pt was visited by a  and reports that he was inquiring about how complaint she would be with taking her meds. The pt reports she told them \"It doesn't seem like I have choice. I will take the meds.\" Pt reports, \"I don't want to take meds, I like my mood, my haven.\"  Pt appears restless, irritable at times.        05/22/17 1573   Behavioral Health   Hallucinations denies / not responding to hallucinations   Thinking distractable;other (see comment)  (restless mind)   Orientation person: oriented;place: oriented;date: oriented;time: oriented   Memory baseline memory   Insight poor;other (see comment)  (\"I don't want to take my meds but I have no choice\")   Judgement impaired   Eye Contact at examiner   Affect irritable;tense;full range affect   Mood irritable;labile;anxious   Physical Appearance/Attire neat   Hygiene other (see comment)  (fair)   Suicidality other (see comments)  (denies)   Self Injury other (see comment)  (denies)   Activity other (see comment)  (in milieu, social, restless, min grps, coloring)   Speech clear;coherent   Medication Sensitivity no stated side effects;no observed side effects   Psychomotor / Gait balanced;steady   Activities of Daily Living   Hygiene/Grooming independent   Oral Hygiene independent   Dress independent   Laundry with supervision   Room Organization independent     "
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
## 2017-05-22 NOTE — PROGRESS NOTES
05/21/17 2300   Behavioral Health   Hallucinations denies / not responding to hallucinations   Thinking intact   Orientation person: oriented;place: oriented;date: oriented;time: oriented   Memory baseline memory   Insight poor   Judgement impaired   Eye Contact at examiner   Affect irritable;full range affect   Mood anxious   Physical Appearance/Attire attire appropriate to age and situation   Hygiene well groomed   Suicidality other (see comments)  (denies)   Self Injury other (see comment)  (denies)   Activity hyperactive (agitated, impulsive)   Speech coherent;clear   Medication Sensitivity no stated side effects;no observed side effects   Psychomotor / Gait balanced;steady   Activities of Daily Living   Hygiene/Grooming independent   Oral Hygiene independent   Dress street clothes   Laundry with supervision   Room Organization independent     Pt was very hyperactive today, she did state that she had two energy drinks throughout the day and that she is having troubles sleeping at night.  Her goal is call her  and figure out what her next steps are after court and discharge.

## 2017-05-23 PROCEDURE — S0166 INJ OLANZAPINE 2.5MG: HCPCS | Performed by: PSYCHIATRY & NEUROLOGY

## 2017-05-23 PROCEDURE — 25000125 ZZHC RX 250: Performed by: PSYCHIATRY & NEUROLOGY

## 2017-05-23 PROCEDURE — 25000132 ZZH RX MED GY IP 250 OP 250 PS 637: Performed by: PSYCHIATRY & NEUROLOGY

## 2017-05-23 PROCEDURE — 99233 SBSQ HOSP IP/OBS HIGH 50: CPT | Performed by: PSYCHIATRY & NEUROLOGY

## 2017-05-23 PROCEDURE — 99207 ZZC CDG-MDM COMPONENT: MEETS MODERATE - UP CODED: CPT | Performed by: PSYCHIATRY & NEUROLOGY

## 2017-05-23 PROCEDURE — 12400001 ZZH R&B MH UMMC

## 2017-05-23 RX ORDER — HYDROXYZINE HYDROCHLORIDE 50 MG/1
50 TABLET, FILM COATED ORAL ONCE
Status: COMPLETED | OUTPATIENT
Start: 2017-05-23 | End: 2017-05-23

## 2017-05-23 RX ORDER — TRIFLUOPERAZINE HYDROCHLORIDE 2 MG/1
4 TABLET, FILM COATED ORAL ONCE
Status: COMPLETED | OUTPATIENT
Start: 2017-05-23 | End: 2017-05-23

## 2017-05-23 RX ORDER — LITHIUM CARBONATE 300 MG/1
300 TABLET, FILM COATED, EXTENDED RELEASE ORAL EVERY 12 HOURS SCHEDULED
Status: DISCONTINUED | OUTPATIENT
Start: 2017-05-23 | End: 2017-05-31

## 2017-05-23 RX ORDER — IBUPROFEN 200 MG
400 TABLET ORAL EVERY 6 HOURS PRN
Status: DISCONTINUED | OUTPATIENT
Start: 2017-05-23 | End: 2017-06-27 | Stop reason: HOSPADM

## 2017-05-23 RX ADMIN — TRAZODONE HYDROCHLORIDE 100 MG: 100 TABLET ORAL at 21:37

## 2017-05-23 RX ADMIN — LITHIUM CARBONATE 300 MG: 300 TABLET, EXTENDED RELEASE ORAL at 21:33

## 2017-05-23 RX ADMIN — HYDROXYZINE HYDROCHLORIDE 50 MG: 50 TABLET, FILM COATED ORAL at 11:54

## 2017-05-23 RX ADMIN — TRIFLUOPERAZINE HYDROCHLORIDE 2 MG: 2 TABLET, FILM COATED ORAL at 09:23

## 2017-05-23 RX ADMIN — TRIFLUOPERAZINE HYDROCHLORIDE 2 MG: 2 TABLET, FILM COATED ORAL at 15:52

## 2017-05-23 RX ADMIN — NICOTINE POLACRILEX 4 MG: 2 GUM, CHEWING ORAL at 18:08

## 2017-05-23 RX ADMIN — NICOTINE POLACRILEX 4 MG: 2 GUM, CHEWING ORAL at 10:58

## 2017-05-23 RX ADMIN — OLANZAPINE 10 MG: 10 TABLET, FILM COATED ORAL at 09:54

## 2017-05-23 RX ADMIN — OLANZAPINE 10 MG: 10 INJECTION, POWDER, LYOPHILIZED, FOR SOLUTION INTRAMUSCULAR at 19:11

## 2017-05-23 RX ADMIN — TRIFLUOPERAZINE HYDROCHLORIDE 2 MG: 2 TABLET, FILM COATED ORAL at 21:34

## 2017-05-23 RX ADMIN — TRIFLUOPERAZINE HYDROCHLORIDE 4 MG: 2 TABLET, FILM COATED ORAL at 11:54

## 2017-05-23 RX ADMIN — PRAZOSIN HYDROCHLORIDE 2 MG: 2 CAPSULE ORAL at 21:33

## 2017-05-23 RX ADMIN — NICOTINE POLACRILEX 4 MG: 2 GUM, CHEWING ORAL at 10:04

## 2017-05-23 RX ADMIN — LITHIUM CARBONATE 300 MG: 300 TABLET, EXTENDED RELEASE ORAL at 09:23

## 2017-05-23 RX ADMIN — LORAZEPAM 0.5 MG: 0.5 TABLET ORAL at 11:33

## 2017-05-23 RX ADMIN — LORAZEPAM 0.5 MG: 0.5 TABLET ORAL at 18:15

## 2017-05-23 ASSESSMENT — ACTIVITIES OF DAILY LIVING (ADL)
ORAL_HYGIENE: INDEPENDENT
LAUNDRY: WITH SUPERVISION
ORAL_HYGIENE: INDEPENDENT
GROOMING: INDEPENDENT
GROOMING: INDEPENDENT
LAUNDRY: UNABLE TO COMPLETE
DRESS: INDEPENDENT
DRESS: STREET CLOTHES;INDEPENDENT

## 2017-05-23 NOTE — PROGRESS NOTES
"[facility], [place]   Psychiatric Progress Note         Interim History and Subjective Reports:   The patient's care was discussed with the treatment team during the daily team meeting.  Staff reports:  No acute issues    Her nurse came to inform me that the patient was reporting distress for auditory hallucinations.   She was sitting in a chair on the outer edge of the lounge. Wearing headphones.   She confirmed AH, they tell her to run and she feels a strong urge to do so. \"But I don't want to run anymore. Will they ever go away?\"    Patient denies SI/HI.  She denies VH. Paranoia is moderate today.          Scheduled Medications:       lithium  300 mg Oral Q12H RADHA     trifluoperazine  4 mg Oral Once     hydrOXYzine  50 mg Oral Once     prazosin  2 mg Oral At Bedtime     trifluoperazine  2 mg Oral TID          Allergies:      Allergies   Allergen Reactions     Adhesive Tape Hives     Prednisone Other (See Comments) and Hives     Suicidal ideation     Droperidol Anxiety          Labs:     No results found for this or any previous visit (from the past 24 hour(s)).       Vitals:   /84 (BP Location: Right arm)  Pulse 108  Temp 96.6  F (35.9  C) (Oral)  Resp 16  Ht 1.676 m (5' 6\")  Wt 90.7 kg (200 lb)  SpO2 96%  BMI 32.28 kg/m2  Weight: 200 lbs 0 oz          Height: 5' 6\"           Body mass index is 32.28 kg/(m^2).        Mental Status Examination:   Appearance: awake, alert and adequately groomed  Attitude:  guarded  Eye Contact:  poor   Mood:  anxious  Affect:  intensity is heightened  Speech:  talked quitely  Psychomotor Behavior:  Appeared restless.   Throught Process:  linear  Associations:  no loose associations  Thought Content:  no evidence of suicidal ideation or homicidal ideation, auditory hallucinations present and Paranoia was evident   Insight:  partial  Judgement:  limited  Oriented to:  time, person, and place  Attention Span and Concentration:  limited  Recent " and Remote Memory:  intact         DIagnoses:     1. Schizoaffective disorder - bipolar type  2. PTSD  3. Suspect borderline personality disorder.   4. History of Marijuana use disorder.   5. History of opiate use disorder         Plan:   1. Biological treatments:  -- Continue stelazine TID for psychosis/anxiety; tolerating well. Prn ativan is available while in the hospital.   -- one time dose of stelazine 4mg+vistaril to address psychosis and associated anxiety  -- started lithium today for mood stabilization;  -- Continue prazosin for PTSD related nightmares; risks reviewed. BP and heart rate should tolerate this addition.     2. Psychosocial treatments:   --addressed with SW consult and groups  -- psychology consult for neuropsychological testing completed: I reviewed the results with her today and she was appreciative. Provide educational handouts on her diagnosis tomorrow  --agreeable for IRTS referral     3. Dispo:  --court hold, awaiting hearing

## 2017-05-23 NOTE — PROGRESS NOTES
"Pt had a difficult day.  \"I am hearing voices.  They tell me to run out the door + get out!\"  Pt was close to the door for a good part of the shift.  Pt rated her anx + dep.  Pt has passing SI, SIB thoughts.  Pt did seek out staff for meds. + to talk to.    "

## 2017-05-23 NOTE — PROGRESS NOTES
05/22/17 2000   Behavioral Health   Hallucinations denies / not responding to hallucinations   Thinking distractable   Orientation person: oriented;place: oriented;date: oriented;time: oriented   Memory baseline memory   Insight poor   Judgement impaired   Eye Contact at examiner   Affect irritable;full range affect   Mood labile;anxious;irritable   Physical Appearance/Attire attire appropriate to age and situation   Hygiene well groomed   Suicidality other (see comments)  (denies)   Self Injury other (see comment)  (denies)   Activity other (see comment)  (social, hyperactive, attended group)   Speech clear;coherent   Medication Sensitivity no stated side effects;no observed side effects   Psychomotor / Gait balanced;steady   Activities of Daily Living   Hygiene/Grooming independent   Oral Hygiene independent   Dress independent   Laundry with supervision   Room Organization independent     Pt was social, attended group and calm today.  Pt complained that she should not be regulated on how many energy drinks she can and cannot have.

## 2017-05-24 PROCEDURE — 25000132 ZZH RX MED GY IP 250 OP 250 PS 637: Performed by: PSYCHIATRY & NEUROLOGY

## 2017-05-24 PROCEDURE — 97150 GROUP THERAPEUTIC PROCEDURES: CPT | Mod: GO

## 2017-05-24 PROCEDURE — 99232 SBSQ HOSP IP/OBS MODERATE 35: CPT | Performed by: PSYCHIATRY & NEUROLOGY

## 2017-05-24 PROCEDURE — 12400001 ZZH R&B MH UMMC

## 2017-05-24 RX ADMIN — NICOTINE POLACRILEX 4 MG: 2 GUM, CHEWING ORAL at 20:44

## 2017-05-24 RX ADMIN — OLANZAPINE 10 MG: 10 TABLET, FILM COATED ORAL at 19:48

## 2017-05-24 RX ADMIN — LORAZEPAM 0.5 MG: 0.5 TABLET ORAL at 07:51

## 2017-05-24 RX ADMIN — TRAZODONE HYDROCHLORIDE 100 MG: 100 TABLET ORAL at 20:48

## 2017-05-24 RX ADMIN — TRIFLUOPERAZINE HYDROCHLORIDE 2 MG: 2 TABLET, FILM COATED ORAL at 07:51

## 2017-05-24 RX ADMIN — OLANZAPINE 10 MG: 10 TABLET, FILM COATED ORAL at 11:59

## 2017-05-24 RX ADMIN — PRAZOSIN HYDROCHLORIDE 2 MG: 2 CAPSULE ORAL at 20:46

## 2017-05-24 RX ADMIN — TRAZODONE HYDROCHLORIDE 100 MG: 100 TABLET ORAL at 21:24

## 2017-05-24 RX ADMIN — OLANZAPINE 10 MG: 10 TABLET, FILM COATED ORAL at 17:34

## 2017-05-24 RX ADMIN — LORAZEPAM 0.5 MG: 0.5 TABLET ORAL at 17:25

## 2017-05-24 RX ADMIN — HYDROXYZINE HYDROCHLORIDE 50 MG: 25 TABLET ORAL at 19:48

## 2017-05-24 RX ADMIN — NICOTINE POLACRILEX 4 MG: 2 GUM, CHEWING ORAL at 18:13

## 2017-05-24 RX ADMIN — NICOTINE POLACRILEX 4 MG: 2 GUM, CHEWING ORAL at 10:53

## 2017-05-24 RX ADMIN — LITHIUM CARBONATE 300 MG: 300 TABLET, EXTENDED RELEASE ORAL at 07:51

## 2017-05-24 RX ADMIN — LITHIUM CARBONATE 300 MG: 300 TABLET, EXTENDED RELEASE ORAL at 19:49

## 2017-05-24 RX ADMIN — IBUPROFEN 400 MG: 200 TABLET, FILM COATED ORAL at 07:52

## 2017-05-24 RX ADMIN — TRIFLUOPERAZINE HYDROCHLORIDE 2 MG: 2 TABLET, FILM COATED ORAL at 20:46

## 2017-05-24 RX ADMIN — TRIFLUOPERAZINE HYDROCHLORIDE 2 MG: 2 TABLET, FILM COATED ORAL at 17:24

## 2017-05-24 ASSESSMENT — ACTIVITIES OF DAILY LIVING (ADL)
GROOMING: INDEPENDENT
ORAL_HYGIENE: INDEPENDENT
DRESS: INDEPENDENT
GROOMING: INDEPENDENT
ORAL_HYGIENE: INDEPENDENT
DRESS: STREET CLOTHES;INDEPENDENT

## 2017-05-24 NOTE — PLAN OF CARE
Problem: Manic Symptoms  Goal: Social and Therapeutic (Manic Symptoms)  Signs and symptoms of listed problems will be absent or manageable.         Pt.attended partial session of OT group today. Pt demonstrated good focus on structured task.  Pt worked independently.  Organized in her efforts.Pt.was cooperative and pleasant throughout session.
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
## 2017-05-24 NOTE — PLAN OF CARE
Problem: Restraint/Seclusion for Violent Self-Destructive Behavior  Goal: Prevent/manage potential problems during restraint/seclusion  Maintain safety of patient and others during period of restraint/seclusion.  Promote psychological and physical wellbeing.  Prevent injury to skin and involved body parts.  Promote nutrition, hydration, and elimination.   Attempts were made to process pt out of seclusion but pt quickly became agitated and told staff to leave, but was able to articulate some healthy coping skills prior to this. She was given the option to use the seclusion restroom, but again told staff to leave. A commode was left in room for pt's use. Will attempt to process pt out of seclusion soon.

## 2017-05-24 NOTE — PLAN OF CARE
Problem: Restraint/Seclusion for Violent Self-Destructive Behavior  Goal: Prevent future episodes of restraint or seclusion  Identify nonphysical alternatives to restraint or seclusion.  Identify additional de-escalation supportive measures to use as alternatives to restraint or seclusion.   Outcome: Improving  Pt was processed out of seclusion at 2120. At this time she noticed a very minor abrasion above her R eyebrow. It is slightly erythematous without edema, weeping or bleeding. Pt was given a cold pack per her request. Her attending Physician was notified of events including this. Pt agreed to use healthier coping skills and was able to verbalize them prior to leaving seclusion. She understood why she was placed into seclusion and verbalized understanding that this will NOT result in her being discharged tomorrow. She agreed to take Rx, look at her glasses and go to bed. She has complied with this thus far. She was able to pop one of her lenses back into her glasses.
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
## 2017-05-24 NOTE — PROGRESS NOTES
"   05/24/17 1428   Behavioral Health   Hallucinations denies / not responding to hallucinations   Thinking distractable;poor concentration   Orientation person: oriented;place: oriented   Memory baseline memory   Insight poor   Judgement impaired   Eye Contact at examiner   Affect irritable;full range affect;tense   Mood irritable;mood is calm   Physical Appearance/Attire attire appropriate to age and situation   Hygiene other (see comment)  (adequate)   Suicidality other (see comments)  (denies)   Self Injury other (see comment)  (denies)   Activity restless   Speech clear;coherent   Medication Sensitivity no stated side effects;no observed side effects   Psychomotor / Gait balanced;steady   Activities of Daily Living   Hygiene/Grooming independent   Oral Hygiene independent   Dress street clothes;independent   Room Organization independent   Significant Event   Significant Event Other (see comments)  (shift summary)   Patient had a irritable shift.    Patient did not require seclusion/restraints to manage behavior.    Ayana Prasad did participate in groups and was visible in the milieu.    Notable mental health symptoms during this shift:depressed mood  irritability  highly active  quick to anger  defiant and/or oppositional    Patient is working on these coping/social skills: Sharing feelings  Distraction  Avoiding engaging in negative behavior of others  Asking for medications when needed    Visitors during this shift included none.  Overall, the visit was none.  Significant events during the visit included none.    Other information about this shift: pt had a irritable shift. Pt seems to try to get negative attention from her peers. Pt needed to take multiple voluntary PRNs this shift to calm down because \"she was feeling cranky, I woke up cranky\".    "

## 2017-05-24 NOTE — CONSULTS
Brief medicine consult note:  - Attempted to see pt for recurrence of LLE numbness that started 1.5 wks ago, however, pt was very tired and in bed and declined at this time. But pt did endorse h/o cauda equina syndrome that developed last December but relieved after emergency surgery. At that time pt had severe low back pain with urinary incontinence that resolved after surgery. Since a little over a week ago, pt developed new onset LLE numbness but denies significant low back pain, bowel or bladder incontinence or saddle paresthesia. Repeat lumbar MRIs from after surgery and this past Jan reveal pt still has large bulging lumbar disc impinging on left S1 nerve root so this is likely cause of pt's recurrence of numbness. Will re-attempt to see pt tomorrow or Friday.       Yaya Jackson PA-C  Internal Medicine Hospitalist & Staff [facility]  [phone]
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
## 2017-05-25 PROCEDURE — 25000132 ZZH RX MED GY IP 250 OP 250 PS 637: Performed by: PSYCHIATRY & NEUROLOGY

## 2017-05-25 PROCEDURE — 90853 GROUP PSYCHOTHERAPY: CPT

## 2017-05-25 PROCEDURE — 99232 SBSQ HOSP IP/OBS MODERATE 35: CPT | Performed by: PSYCHIATRY & NEUROLOGY

## 2017-05-25 PROCEDURE — 99232 SBSQ HOSP IP/OBS MODERATE 35: CPT | Performed by: PHYSICIAN ASSISTANT

## 2017-05-25 PROCEDURE — 99207 ZZC CONSULT E&M CHANGED TO SUBSEQUENT LEVEL: CPT | Performed by: PHYSICIAN ASSISTANT

## 2017-05-25 PROCEDURE — S0166 INJ OLANZAPINE 2.5MG: HCPCS | Performed by: PSYCHIATRY & NEUROLOGY

## 2017-05-25 PROCEDURE — 25000125 ZZHC RX 250: Performed by: PSYCHIATRY & NEUROLOGY

## 2017-05-25 PROCEDURE — 12400001 ZZH R&B MH UMMC

## 2017-05-25 RX ORDER — OLANZAPINE 10 MG/2ML
10 INJECTION, POWDER, FOR SOLUTION INTRAMUSCULAR 2 TIMES DAILY PRN
Status: DISCONTINUED | OUTPATIENT
Start: 2017-05-25 | End: 2017-06-05

## 2017-05-25 RX ORDER — TRIFLUOPERAZINE HYDROCHLORIDE 2 MG/1
4 TABLET, FILM COATED ORAL 3 TIMES DAILY PRN
Status: DISCONTINUED | OUTPATIENT
Start: 2017-05-25 | End: 2017-06-05

## 2017-05-25 RX ORDER — BENZTROPINE MESYLATE 1 MG/1
1 TABLET ORAL 2 TIMES DAILY PRN
Status: DISCONTINUED | OUTPATIENT
Start: 2017-05-25 | End: 2017-06-27 | Stop reason: HOSPADM

## 2017-05-25 RX ORDER — TRIFLUOPERAZINE HYDROCHLORIDE 2 MG/1
4 TABLET, FILM COATED ORAL AT BEDTIME
Status: DISCONTINUED | OUTPATIENT
Start: 2017-05-25 | End: 2017-05-31

## 2017-05-25 RX ADMIN — TRAZODONE HYDROCHLORIDE 100 MG: 100 TABLET ORAL at 21:59

## 2017-05-25 RX ADMIN — PRAZOSIN HYDROCHLORIDE 2 MG: 2 CAPSULE ORAL at 19:50

## 2017-05-25 RX ADMIN — OLANZAPINE 10 MG: 10 INJECTION, POWDER, LYOPHILIZED, FOR SOLUTION INTRAMUSCULAR at 22:03

## 2017-05-25 RX ADMIN — LITHIUM CARBONATE 300 MG: 300 TABLET, EXTENDED RELEASE ORAL at 08:26

## 2017-05-25 RX ADMIN — IBUPROFEN 400 MG: 200 TABLET, FILM COATED ORAL at 12:34

## 2017-05-25 RX ADMIN — NICOTINE POLACRILEX 4 MG: 2 GUM, CHEWING ORAL at 12:34

## 2017-05-25 RX ADMIN — TRIFLUOPERAZINE HYDROCHLORIDE 2 MG: 2 TABLET, FILM COATED ORAL at 19:50

## 2017-05-25 RX ADMIN — TRAZODONE HYDROCHLORIDE 100 MG: 100 TABLET ORAL at 21:06

## 2017-05-25 RX ADMIN — LORAZEPAM 0.5 MG: 0.5 TABLET ORAL at 08:25

## 2017-05-25 RX ADMIN — NICOTINE POLACRILEX 4 MG: 2 GUM, CHEWING ORAL at 18:45

## 2017-05-25 RX ADMIN — TRIFLUOPERAZINE HYDROCHLORIDE 4 MG: 2 TABLET, FILM COATED ORAL at 20:45

## 2017-05-25 RX ADMIN — TRIFLUOPERAZINE HYDROCHLORIDE 2 MG: 2 TABLET, FILM COATED ORAL at 13:33

## 2017-05-25 RX ADMIN — HYDROXYZINE HYDROCHLORIDE 50 MG: 25 TABLET ORAL at 20:45

## 2017-05-25 RX ADMIN — LORAZEPAM 0.5 MG: 0.5 TABLET ORAL at 19:41

## 2017-05-25 RX ADMIN — OLANZAPINE 10 MG: 10 TABLET, FILM COATED ORAL at 10:17

## 2017-05-25 RX ADMIN — NICOTINE POLACRILEX 4 MG: 2 GUM, CHEWING ORAL at 08:26

## 2017-05-25 RX ADMIN — NICOTINE POLACRILEX 4 MG: 2 GUM, CHEWING ORAL at 19:41

## 2017-05-25 RX ADMIN — TRIFLUOPERAZINE HYDROCHLORIDE 2 MG: 2 TABLET, FILM COATED ORAL at 08:25

## 2017-05-25 RX ADMIN — LITHIUM CARBONATE 300 MG: 300 TABLET, EXTENDED RELEASE ORAL at 19:50

## 2017-05-25 ASSESSMENT — ACTIVITIES OF DAILY LIVING (ADL)
GROOMING: INDEPENDENT
ORAL_HYGIENE: INDEPENDENT
DRESS: INDEPENDENT
GROOMING: INDEPENDENT
ORAL_HYGIENE: INDEPENDENT
LAUNDRY: WITH SUPERVISION
LAUNDRY: WITH SUPERVISION
DRESS: INDEPENDENT

## 2017-05-25 NOTE — PLAN OF CARE
Problem: Manic Symptoms  Intervention: Social and Therapeutic Interv (Manic Symptoms)     Pt attended the OT discussion group which involved reading a selected quote, discussing it's meaning, and relating it to personal life experience or situation.  Pt spoke of how people in her support  her and don't  her.

## 2017-05-25 NOTE — PROGRESS NOTES
"[facility], [place]   Psychiatric Progress Note         Interim History and Subjective Reports:   The patient's care was discussed with the treatment team during the daily team meeting.  Staff reports:  Needs frequent prn medications for anxiety and psychosis    Sleeping in bed. She had received prn zyprexa earlier today for paranoia and AH.  Patient denies SI/HI.  She denies VH. Paranoia is moderate today.          Scheduled Medications:       lithium  300 mg Oral Q12H RADHA     prazosin  2 mg Oral At Bedtime     trifluoperazine  2 mg Oral TID          Allergies:      Allergies   Allergen Reactions     Adhesive Tape Hives     Prednisone Other (See Comments) and Hives     Suicidal ideation     Droperidol Anxiety          Labs:     No results found for this or any previous visit (from the past 24 hour(s)).       Vitals:   /80 (BP Location: Right arm)  Pulse 91  Temp 96.6  F (35.9  C) (Oral)  Resp 16  Ht 1.676 m (5' 6\")  Wt 90.7 kg (200 lb)  SpO2 96%  BMI 32.28 kg/m2  Weight: 200 lbs 0 oz          Height: 5' 6\"           Body mass index is 32.28 kg/(m^2).        Mental Status Examination:   Appearance: awake, alert and adequately groomed  Attitude:  guarded  Eye Contact:  poor   Mood:  anxious  Affect:  intensity is blunted  Speech:  talked quitely  Psychomotor Behavior:  no evidence of tardive dyskinesia, dystonia, or tics.   Throught Process:  linear  Associations:  no loose associations  Thought Content:  no evidence of suicidal ideation or homicidal ideation, auditory hallucinations present and Paranoia was evident   Insight:  partial  Judgement:  limited  Oriented to:  time, person, and place  Attention Span and Concentration:  limited  Recent and Remote Memory:  intact         DIagnoses:     1. Schizoaffective disorder - bipolar type  2. PTSD  3. Suspect borderline personality disorder.   4. History of Marijuana use disorder.   5. History of opiate use disorder         " Plan:   1. Biological treatments:  -- Increase the evening dose of stelazine for psychosis; change prn zyprexa to prn stelazine to help assess the effective dose needed.   -- cont the higher dose of lithium for mood stabilization; check a serum level next week  -- Continue prazosin for PTSD related nightmares;     2. Psychosocial treatments:   --addressed with SW consult and groups  -- psychology consult for neuropsychological testing completed: I reviewed the results with her today and she was appreciative. Provide educational handouts on her diagnosis tomorrow  --agreeable for IRTS referral     3. Dispo:  --court hold, awaiting hearing
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
## 2017-05-25 NOTE — PROGRESS NOTES
"Patient irritated and agitated that she was not able to get her energy drink this evening. Patient approached the med window stating that we were changing the rules on her. Patient then stated, \" be prepared for a code tonight\".   "

## 2017-05-25 NOTE — PROGRESS NOTES
"[facility], [place]   Psychiatric Progress Note         Interim History and Subjective Reports:   The patient's care was discussed with the treatment team during the daily team meeting.  Staff reports:  No acute issues    She feels much better today. Smiling and reporting reduction of AH and paranoia. The additional dose of medicine was effective.     Patient denies SI/HI.  She denies VH. Paranoia is moderate today.          Scheduled Medications:       lithium  300 mg Oral Q12H RADHA     prazosin  2 mg Oral At Bedtime     trifluoperazine  2 mg Oral TID          Allergies:      Allergies   Allergen Reactions     Adhesive Tape Hives     Prednisone Other (See Comments) and Hives     Suicidal ideation     Droperidol Anxiety          Labs:     No results found for this or any previous visit (from the past 24 hour(s)).       Vitals:   /80 (BP Location: Right arm)  Pulse 91  Temp 96.6  F (35.9  C) (Oral)  Resp 16  Ht 1.676 m (5' 6\")  Wt 90.7 kg (200 lb)  SpO2 96%  BMI 32.28 kg/m2  Weight: 200 lbs 0 oz          Height: 5' 6\"           Body mass index is 32.28 kg/(m^2).        Mental Status Examination:   Appearance: awake, alert and adequately groomed  Attitude:  guarded  Eye Contact:  poor   Mood:  anxious  Affect:  intensity is heightened  Speech:  talked quitely  Psychomotor Behavior:  Appeared restless.   Throught Process:  linear  Associations:  no loose associations  Thought Content:  no evidence of suicidal ideation or homicidal ideation, auditory hallucinations present and Paranoia was evident   Insight:  partial  Judgement:  limited  Oriented to:  time, person, and place  Attention Span and Concentration:  limited  Recent and Remote Memory:  intact         DIagnoses:     1. Schizoaffective disorder - bipolar type  2. PTSD  3. Suspect borderline personality disorder.   4. History of Marijuana use disorder.   5. History of opiate use disorder         Plan:   1. Biological " treatments:  -- Continue stelazine TID for psychosis/anxiety; tolerating well. Prn ativan is available while in the hospital. Consider increasing stelazine dose.   -- increased lithium today for mood stabilization;  -- Continue prazosin for PTSD related nightmares;     2. Psychosocial treatments:   --addressed with SW consult and groups  -- psychology consult for neuropsychological testing completed: I reviewed the results with her today and she was appreciative. Provide educational handouts on her diagnosis tomorrow  --agreeable for IRTS referral     3. Dispo:  --court hold, awaiting hearing

## 2017-05-26 PROCEDURE — 25000132 ZZH RX MED GY IP 250 OP 250 PS 637: Performed by: PSYCHIATRY & NEUROLOGY

## 2017-05-26 PROCEDURE — 12400001 ZZH R&B MH UMMC

## 2017-05-26 PROCEDURE — H2032 ACTIVITY THERAPY, PER 15 MIN: HCPCS

## 2017-05-26 PROCEDURE — 97150 GROUP THERAPEUTIC PROCEDURES: CPT | Mod: GO

## 2017-05-26 RX ADMIN — LORAZEPAM 0.5 MG: 0.5 TABLET ORAL at 07:36

## 2017-05-26 RX ADMIN — TRIFLUOPERAZINE HYDROCHLORIDE 2 MG: 2 TABLET, FILM COATED ORAL at 13:11

## 2017-05-26 RX ADMIN — TRIFLUOPERAZINE HYDROCHLORIDE 2 MG: 2 TABLET, FILM COATED ORAL at 07:37

## 2017-05-26 RX ADMIN — LITHIUM CARBONATE 300 MG: 300 TABLET, EXTENDED RELEASE ORAL at 07:38

## 2017-05-26 RX ADMIN — TRIFLUOPERAZINE HYDROCHLORIDE 4 MG: 2 TABLET, FILM COATED ORAL at 12:24

## 2017-05-26 RX ADMIN — IBUPROFEN 400 MG: 200 TABLET, FILM COATED ORAL at 16:27

## 2017-05-26 RX ADMIN — NICOTINE POLACRILEX 2 MG: 2 GUM, CHEWING ORAL at 07:37

## 2017-05-26 RX ADMIN — ACETAMINOPHEN 650 MG: 325 TABLET, FILM COATED ORAL at 18:10

## 2017-05-26 RX ADMIN — TRIFLUOPERAZINE HYDROCHLORIDE 2 MG: 2 TABLET, FILM COATED ORAL at 22:20

## 2017-05-26 RX ADMIN — LITHIUM CARBONATE 300 MG: 300 TABLET, EXTENDED RELEASE ORAL at 22:20

## 2017-05-26 RX ADMIN — NICOTINE POLACRILEX 4 MG: 2 GUM, CHEWING ORAL at 11:35

## 2017-05-26 RX ADMIN — NICOTINE POLACRILEX 4 MG: 2 GUM, CHEWING ORAL at 07:36

## 2017-05-26 RX ADMIN — TRIFLUOPERAZINE HYDROCHLORIDE 4 MG: 2 TABLET, FILM COATED ORAL at 22:20

## 2017-05-26 RX ADMIN — LORAZEPAM 0.5 MG: 0.5 TABLET ORAL at 18:49

## 2017-05-26 RX ADMIN — HYDROXYZINE HYDROCHLORIDE 50 MG: 25 TABLET ORAL at 08:20

## 2017-05-26 RX ADMIN — LORAZEPAM 0.5 MG: 0.5 TABLET ORAL at 07:37

## 2017-05-26 RX ADMIN — PRAZOSIN HYDROCHLORIDE 2 MG: 2 CAPSULE ORAL at 22:20

## 2017-05-26 NOTE — PLAN OF CARE
Problem: Manic Symptoms  Goal: Manic Symptoms  Signs and symptoms of listed problems will be absent or manageable.   Patient will report less lability of mood  Patient will maintain appropriate boundaries with peers and staff  Patient speech will be notable for appropriate content, rate, and volume  Patient will report less difficulty falling and staying asleep   Outcome: Improving  Patient states that she feels restless and that her thinking is not calm. Asked for prn first thing this am. Checked in and states that her urges to [place] are usually present but under control. Feels restless and is hoping for a stay of commitment as she is feeling to confined on this unit. Patient is willing to problem solve and work on ways to cope. States that she does not like feeling sedated and sleeping during the day as a result of using her prn medication.Engages in conversation and willing to share thoughts and feelings.Patient is tense and using headphones and walking in the song to cope.

## 2017-05-26 NOTE — PLAN OF CARE
Problem: Manic Symptoms  Goal: Social and Therapeutic (Manic Symptoms)  Signs and symptoms of listed problems will be absent or manageable.         Pt. Attended 1 of 2 scheduled OT sessions today. Pt. Actively participated in goal directed task session. Pt selected an unfamiliar task and worked independently.  Creative and organized in her efforts.  Social with peers.  Expressed pride in her finished project.

## 2017-05-26 NOTE — PROGRESS NOTES
I called the [place] court  to see if she made any referrals for IRTS and she did not. I asked her if the IRTS had to be in [place] and she said no.  [place] [facility] [place] get through to the .  I met with pt to talk about IRTS placement. SHe was initially resistant. She said she would go back to her own apartment and I challenged her that she was just living with friends, did not have her own apartment and could not go back to parents. She asked questions about how this tied into the legal status.  Pt said that she would prefer to go to an IRTS in [facility]. I will make the referrals after this long weekend.

## 2017-05-26 NOTE — PROGRESS NOTES
05/25/17 2204   Behavioral Health   Hallucinations denies / not responding to hallucinations   Thinking distractable   Orientation person: oriented;place: oriented   Memory baseline memory   Insight poor   Judgement impaired   Eye Contact at examiner   Affect tense   Mood labile   Physical Appearance/Attire appears stated age   Hygiene well groomed   Suicidality other (see comments)   Self Injury other (see comment)  (none reported)   Activity other (see comment)  (none reported)   Speech clear;coherent   Psychomotor / Gait balanced;steady

## 2017-05-27 PROCEDURE — 25000132 ZZH RX MED GY IP 250 OP 250 PS 637: Performed by: PSYCHIATRY & NEUROLOGY

## 2017-05-27 PROCEDURE — S0166 INJ OLANZAPINE 2.5MG: HCPCS | Performed by: PSYCHIATRY & NEUROLOGY

## 2017-05-27 PROCEDURE — 12400001 ZZH R&B MH UMMC

## 2017-05-27 PROCEDURE — 25000125 ZZHC RX 250: Performed by: PSYCHIATRY & NEUROLOGY

## 2017-05-27 RX ADMIN — OLANZAPINE 10 MG: 10 INJECTION, POWDER, LYOPHILIZED, FOR SOLUTION INTRAMUSCULAR at 12:28

## 2017-05-27 RX ADMIN — LORAZEPAM 0.5 MG: 0.5 TABLET ORAL at 12:23

## 2017-05-27 RX ADMIN — NICOTINE POLACRILEX 4 MG: 2 GUM, CHEWING ORAL at 07:13

## 2017-05-27 RX ADMIN — TRIFLUOPERAZINE HYDROCHLORIDE 4 MG: 2 TABLET, FILM COATED ORAL at 16:29

## 2017-05-27 RX ADMIN — PRAZOSIN HYDROCHLORIDE 2 MG: 2 CAPSULE ORAL at 20:21

## 2017-05-27 RX ADMIN — IBUPROFEN 400 MG: 200 TABLET, FILM COATED ORAL at 08:40

## 2017-05-27 RX ADMIN — OLANZAPINE 10 MG: 10 INJECTION, POWDER, LYOPHILIZED, FOR SOLUTION INTRAMUSCULAR at 20:23

## 2017-05-27 RX ADMIN — TRIFLUOPERAZINE HYDROCHLORIDE 2 MG: 2 TABLET, FILM COATED ORAL at 07:50

## 2017-05-27 RX ADMIN — TRIFLUOPERAZINE HYDROCHLORIDE 4 MG: 2 TABLET, FILM COATED ORAL at 21:04

## 2017-05-27 RX ADMIN — LITHIUM CARBONATE 300 MG: 300 TABLET, EXTENDED RELEASE ORAL at 20:22

## 2017-05-27 RX ADMIN — LITHIUM CARBONATE 300 MG: 300 TABLET, EXTENDED RELEASE ORAL at 07:50

## 2017-05-27 RX ADMIN — NICOTINE POLACRILEX 4 MG: 2 GUM, CHEWING ORAL at 13:09

## 2017-05-27 RX ADMIN — ACETAMINOPHEN 650 MG: 325 TABLET, FILM COATED ORAL at 10:10

## 2017-05-27 RX ADMIN — TRIFLUOPERAZINE HYDROCHLORIDE 2 MG: 2 TABLET, FILM COATED ORAL at 13:09

## 2017-05-27 RX ADMIN — LORAZEPAM 0.5 MG: 0.5 TABLET ORAL at 19:18

## 2017-05-27 RX ADMIN — NICOTINE POLACRILEX 4 MG: 2 GUM, CHEWING ORAL at 10:43

## 2017-05-27 RX ADMIN — TRAZODONE HYDROCHLORIDE 100 MG: 100 TABLET ORAL at 20:52

## 2017-05-27 ASSESSMENT — ACTIVITIES OF DAILY LIVING (ADL)
ORAL_HYGIENE: INDEPENDENT
DRESS: INDEPENDENT
LAUNDRY: WITH SUPERVISION
HYGIENE/GROOMING: INDEPENDENT
ORAL_HYGIENE: INDEPENDENT
GROOMING: INDEPENDENT
DRESS: STREET CLOTHES;INDEPENDENT

## 2017-05-28 PROCEDURE — S0166 INJ OLANZAPINE 2.5MG: HCPCS | Performed by: PSYCHIATRY & NEUROLOGY

## 2017-05-28 PROCEDURE — 25000132 ZZH RX MED GY IP 250 OP 250 PS 637: Performed by: PSYCHIATRY & NEUROLOGY

## 2017-05-28 PROCEDURE — 12400001 ZZH R&B MH UMMC

## 2017-05-28 PROCEDURE — 25000125 ZZHC RX 250: Performed by: PSYCHIATRY & NEUROLOGY

## 2017-05-28 RX ADMIN — LORAZEPAM 0.5 MG: 0.5 TABLET ORAL at 11:33

## 2017-05-28 RX ADMIN — NICOTINE POLACRILEX 4 MG: 2 GUM, CHEWING ORAL at 13:15

## 2017-05-28 RX ADMIN — OLANZAPINE 10 MG: 10 INJECTION, POWDER, LYOPHILIZED, FOR SOLUTION INTRAMUSCULAR at 19:28

## 2017-05-28 RX ADMIN — LORAZEPAM 0.5 MG: 0.5 TABLET ORAL at 04:39

## 2017-05-28 RX ADMIN — LITHIUM CARBONATE 300 MG: 300 TABLET, EXTENDED RELEASE ORAL at 20:45

## 2017-05-28 RX ADMIN — NICOTINE POLACRILEX 4 MG: 2 GUM, CHEWING ORAL at 10:10

## 2017-05-28 RX ADMIN — TRIFLUOPERAZINE HYDROCHLORIDE 2 MG: 2 TABLET, FILM COATED ORAL at 20:45

## 2017-05-28 RX ADMIN — TRAZODONE HYDROCHLORIDE 100 MG: 100 TABLET ORAL at 20:45

## 2017-05-28 RX ADMIN — LITHIUM CARBONATE 300 MG: 300 TABLET, EXTENDED RELEASE ORAL at 08:39

## 2017-05-28 RX ADMIN — TRIFLUOPERAZINE HYDROCHLORIDE 4 MG: 2 TABLET, FILM COATED ORAL at 20:47

## 2017-05-28 RX ADMIN — TRIFLUOPERAZINE HYDROCHLORIDE 2 MG: 2 TABLET, FILM COATED ORAL at 13:16

## 2017-05-28 RX ADMIN — TRAZODONE HYDROCHLORIDE 100 MG: 100 TABLET ORAL at 21:26

## 2017-05-28 RX ADMIN — TRIFLUOPERAZINE HYDROCHLORIDE 2 MG: 2 TABLET, FILM COATED ORAL at 09:13

## 2017-05-28 RX ADMIN — PRAZOSIN HYDROCHLORIDE 2 MG: 2 CAPSULE ORAL at 20:45

## 2017-05-28 RX ADMIN — NICOTINE POLACRILEX 4 MG: 2 GUM, CHEWING ORAL at 08:57

## 2017-05-28 RX ADMIN — NICOTINE POLACRILEX 4 MG: 2 GUM, CHEWING ORAL at 18:31

## 2017-05-28 RX ADMIN — IBUPROFEN 400 MG: 200 TABLET, FILM COATED ORAL at 04:39

## 2017-05-28 ASSESSMENT — ACTIVITIES OF DAILY LIVING (ADL)
DRESS: INDEPENDENT
ORAL_HYGIENE: INDEPENDENT
LAUNDRY: WITH SUPERVISION
GROOMING: INDEPENDENT

## 2017-05-28 NOTE — PLAN OF CARE
"Problem: Manic Symptoms  Goal: Manic Symptoms  Signs and symptoms of listed problems will be absent or manageable.   Patient will report less lability of mood  Patient will maintain appropriate boundaries with peers and staff  Patient speech will be notable for appropriate content, rate, and volume  Patient will report less difficulty falling and staying asleep   Outcome: Improving  \"Remember when I came in.? I liked that energy level, I don't like feeling slowed down.I feel like picking up a chair and smashing it. How hard would it be to break that window?\" Patient spends time with female peers talking, watching movies and doing crafts. Frequently asks for prn medications to help when feeling stressed and agitated. Patient is agreeable to throwing a ball which helps with distraction and reducing anxiety. If unable to immediately respond to her prn request, patient retreats to her room and sits in her window sill and needs coaxing to use stress reducing activities. Patient complains of being unable to go outside and looks forward to going to court. Patient has range of affect from smiling and joking to stressed and agitated.       "

## 2017-05-28 NOTE — PROGRESS NOTES
"   05/27/17 2245   Behavioral Health   Hallucinations auditory   Thinking poor concentration   Orientation person: oriented;place: oriented;date: oriented;time: oriented   Memory baseline memory   Insight poor   Judgement impaired   Eye Contact at examiner;into space   Affect tense;blunted, flat   Mood irritable;anxious   Physical Appearance/Attire attire appropriate to age and situation   Hygiene well groomed   Suicidality other (see comments)  (Denies)   Self Injury other (see comment)  (Denies)   Activity other (see comment);withdrawn  (Present in milieu)   Speech coherent;clear   Medication Sensitivity no observed side effects;no stated side effects   Psychomotor / Gait steady;balanced   Psycho Education   Type of Intervention 1:1 intervention   Response participates, initiates socially appropriate   Hours 0.5   Treatment Detail check in   Activities of Daily Living   Hygiene/Grooming independent   Oral Hygiene independent   Dress independent   Room Organization independent     Ayana was present in the milieu but withdrawn in her demeanor. She attended community meeting and engaged appropriately. She sought out staff later in the evening to assist in deescalating due to high anxiety. She state she was \"about to flip out\" and was hearing voices telling her to \"just do it\". She denies SI/SIB but feels depressed and anxious about missing events in her personal life. For future agitation, staff suggests playing catch with the paitient during check in. This seems to deescalate the situation and provide redirection while processing.  "

## 2017-05-29 PROCEDURE — 90853 GROUP PSYCHOTHERAPY: CPT

## 2017-05-29 PROCEDURE — 25000132 ZZH RX MED GY IP 250 OP 250 PS 637: Performed by: PSYCHIATRY & NEUROLOGY

## 2017-05-29 PROCEDURE — 12400001 ZZH R&B MH UMMC

## 2017-05-29 RX ORDER — OLANZAPINE 10 MG/1
10 TABLET ORAL DAILY PRN
Status: DISCONTINUED | OUTPATIENT
Start: 2017-05-29 | End: 2017-06-05

## 2017-05-29 RX ADMIN — LORAZEPAM 0.5 MG: 0.5 TABLET ORAL at 02:16

## 2017-05-29 RX ADMIN — TRIFLUOPERAZINE HYDROCHLORIDE 2 MG: 2 TABLET, FILM COATED ORAL at 14:40

## 2017-05-29 RX ADMIN — LITHIUM CARBONATE 300 MG: 300 TABLET, EXTENDED RELEASE ORAL at 19:53

## 2017-05-29 RX ADMIN — TRAZODONE HYDROCHLORIDE 100 MG: 100 TABLET ORAL at 19:54

## 2017-05-29 RX ADMIN — TRIFLUOPERAZINE HYDROCHLORIDE 2 MG: 2 TABLET, FILM COATED ORAL at 19:54

## 2017-05-29 RX ADMIN — HYDROXYZINE HYDROCHLORIDE 50 MG: 25 TABLET ORAL at 18:47

## 2017-05-29 RX ADMIN — TRIFLUOPERAZINE HYDROCHLORIDE 4 MG: 2 TABLET, FILM COATED ORAL at 10:52

## 2017-05-29 RX ADMIN — IBUPROFEN 400 MG: 200 TABLET, FILM COATED ORAL at 15:14

## 2017-05-29 RX ADMIN — LORAZEPAM 0.5 MG: 0.5 TABLET ORAL at 09:56

## 2017-05-29 RX ADMIN — PRAZOSIN HYDROCHLORIDE 2 MG: 2 CAPSULE ORAL at 19:54

## 2017-05-29 RX ADMIN — NICOTINE POLACRILEX 4 MG: 2 GUM, CHEWING ORAL at 07:58

## 2017-05-29 RX ADMIN — NICOTINE POLACRILEX 4 MG: 2 GUM, CHEWING ORAL at 14:56

## 2017-05-29 RX ADMIN — TRIFLUOPERAZINE HYDROCHLORIDE 4 MG: 2 TABLET, FILM COATED ORAL at 16:33

## 2017-05-29 RX ADMIN — IBUPROFEN 400 MG: 200 TABLET, FILM COATED ORAL at 02:16

## 2017-05-29 RX ADMIN — TRIFLUOPERAZINE HYDROCHLORIDE 4 MG: 2 TABLET, FILM COATED ORAL at 19:55

## 2017-05-29 RX ADMIN — TRIFLUOPERAZINE HYDROCHLORIDE 2 MG: 2 TABLET, FILM COATED ORAL at 07:58

## 2017-05-29 RX ADMIN — LITHIUM CARBONATE 300 MG: 300 TABLET, EXTENDED RELEASE ORAL at 07:58

## 2017-05-29 ASSESSMENT — ACTIVITIES OF DAILY LIVING (ADL)
GROOMING: INDEPENDENT
DRESS: INDEPENDENT
LAUNDRY: WITH SUPERVISION
ORAL_HYGIENE: INDEPENDENT

## 2017-05-29 NOTE — PLAN OF CARE
"Problem: Manic Symptoms  Goal: Manic Symptoms  Signs and symptoms of listed problems will be absent or manageable.   Patient will report less lability of mood  Patient will maintain appropriate boundaries with peers and staff  Patient speech will be notable for appropriate content, rate, and volume  Patient will report less difficulty falling and staying asleep   Outcome: Improving  Pt said that she had the same \"compulsion\" to simply go walk that she presented with upon admission. If she had the chance she said she would go walk and walk without any particular planning or destination in mind. She was not able to recall an earlier conversation last week when the dangers of walking aimlessly without a plan or supplies were reviewed with her. She also admitted to feeling the compulsion to throw and break hospital furniture like she did earlier last week, however, she was able to control this compulsion with various staff interventions. She was lauded for being honest with staff about these feelings and not acting upon them. She also admitted to auditory hallucinations and agreed with various interventions to mitigate them. She has required various PRN for anxiety and irritability which she partially attributes to not being able to go outside. She was irritated that staff would not give her caffeinated energy drinks at 1900 and was reminded that yesterday she had agreed to stop them completely.       "

## 2017-05-30 PROCEDURE — 12400001 ZZH R&B MH UMMC

## 2017-05-30 PROCEDURE — 25000132 ZZH RX MED GY IP 250 OP 250 PS 637: Performed by: PSYCHIATRY & NEUROLOGY

## 2017-05-30 PROCEDURE — 25000125 ZZHC RX 250: Performed by: PSYCHIATRY & NEUROLOGY

## 2017-05-30 PROCEDURE — S0166 INJ OLANZAPINE 2.5MG: HCPCS | Performed by: PSYCHIATRY & NEUROLOGY

## 2017-05-30 RX ORDER — TRAZODONE HYDROCHLORIDE 100 MG/1
200 TABLET ORAL AT BEDTIME
Status: DISCONTINUED | OUTPATIENT
Start: 2017-05-30 | End: 2017-06-08

## 2017-05-30 RX ORDER — TRIFLUOPERAZINE HYDROCHLORIDE 2 MG/1
2 TABLET, FILM COATED ORAL 2 TIMES DAILY
Status: DISCONTINUED | OUTPATIENT
Start: 2017-05-31 | End: 2017-06-05

## 2017-05-30 RX ORDER — PROPRANOLOL HYDROCHLORIDE 20 MG/1
20 TABLET ORAL 2 TIMES DAILY
Status: DISCONTINUED | OUTPATIENT
Start: 2017-05-30 | End: 2017-05-31

## 2017-05-30 RX ORDER — PROPRANOLOL HYDROCHLORIDE 20 MG/1
20 TABLET ORAL 2 TIMES DAILY
Status: DISCONTINUED | OUTPATIENT
Start: 2017-05-30 | End: 2017-05-30

## 2017-05-30 RX ORDER — TRIFLUOPERAZINE HYDROCHLORIDE 2 MG/1
4 TABLET, FILM COATED ORAL AT BEDTIME
Status: DISCONTINUED | OUTPATIENT
Start: 2017-05-30 | End: 2017-06-05

## 2017-05-30 RX ADMIN — IBUPROFEN 400 MG: 200 TABLET, FILM COATED ORAL at 12:54

## 2017-05-30 RX ADMIN — ONDANSETRON 4 MG: 4 TABLET, ORALLY DISINTEGRATING ORAL at 08:16

## 2017-05-30 RX ADMIN — NICOTINE POLACRILEX 4 MG: 2 GUM, CHEWING ORAL at 12:08

## 2017-05-30 RX ADMIN — LITHIUM CARBONATE 300 MG: 300 TABLET, EXTENDED RELEASE ORAL at 07:43

## 2017-05-30 RX ADMIN — TRAZODONE HYDROCHLORIDE 200 MG: 100 TABLET ORAL at 20:50

## 2017-05-30 RX ADMIN — PRAZOSIN HYDROCHLORIDE 2 MG: 2 CAPSULE ORAL at 20:49

## 2017-05-30 RX ADMIN — LORAZEPAM 0.5 MG: 0.5 TABLET ORAL at 06:04

## 2017-05-30 RX ADMIN — LORAZEPAM 0.5 MG: 0.5 TABLET ORAL at 12:08

## 2017-05-30 RX ADMIN — TRIFLUOPERAZINE HYDROCHLORIDE 4 MG: 2 TABLET, FILM COATED ORAL at 20:39

## 2017-05-30 RX ADMIN — PROPRANOLOL HYDROCHLORIDE 20 MG: 20 TABLET ORAL at 16:09

## 2017-05-30 RX ADMIN — TRIFLUOPERAZINE HYDROCHLORIDE 2 MG: 2 TABLET, FILM COATED ORAL at 07:42

## 2017-05-30 RX ADMIN — OLANZAPINE 10 MG: 10 INJECTION, POWDER, LYOPHILIZED, FOR SOLUTION INTRAMUSCULAR at 20:39

## 2017-05-30 RX ADMIN — LITHIUM CARBONATE 300 MG: 300 TABLET, EXTENDED RELEASE ORAL at 20:02

## 2017-05-30 RX ADMIN — NICOTINE POLACRILEX 4 MG: 2 GUM, CHEWING ORAL at 08:43

## 2017-05-30 RX ADMIN — TRIFLUOPERAZINE HYDROCHLORIDE 2 MG: 2 TABLET, FILM COATED ORAL at 13:34

## 2017-05-30 RX ADMIN — NICOTINE POLACRILEX 4 MG: 2 GUM, CHEWING ORAL at 20:02

## 2017-05-30 RX ADMIN — NICOTINE POLACRILEX 4 MG: 2 GUM, CHEWING ORAL at 16:09

## 2017-05-30 ASSESSMENT — ACTIVITIES OF DAILY LIVING (ADL)
LAUNDRY: WITH SUPERVISION
DRESS: INDEPENDENT
ORAL_HYGIENE: INDEPENDENT
GROOMING: INDEPENDENT

## 2017-05-30 NOTE — PROGRESS NOTES
"[facility]   Psychiatric Progress Note         Interim History and Subjective Reports:   The patient's care was discussed with the treatment team during the daily team meeting.  Staff reports:  No acute issues, attended court today    She complained of feeling restless and having excessive energy.  Auditory hallucinations occur throughout the day however decreased in intensity.  She is appreciative of the medications.   No side effects reported.  Patient denies SI/HI.  She denies VH. Paranoia is moderate today.          Scheduled Medications:       traZODone  200 mg Oral At Bedtime     trifluoperazine  4 mg Oral At Bedtime     lithium  300 mg Oral Q12H RADHA     prazosin  2 mg Oral At Bedtime     trifluoperazine  2 mg Oral TID          Allergies:      Allergies   Allergen Reactions     Adhesive Tape Hives     Prednisone Other (See Comments) and Hives     Suicidal ideation     Droperidol Anxiety          Labs:     No results found for this or any previous visit (from the past 24 hour(s)).       Vitals:   /87  Pulse 99  Temp 97  F (36.1  C)  Resp 16  Ht 1.676 m (5' 6\")  Wt 90.7 kg (200 lb)  SpO2 96%  BMI 32.28 kg/m2  Weight: 200 lbs 0 oz          Height: 5' 6\"           Body mass index is 32.28 kg/(m^2).        Mental Status Examination:   Appearance: awake, alert and adequately groomed  Attitude:  cooperative  Eye Contact:  poor   Mood:  better  Affect:  appropriate and in normal range  Speech:  clear, coherent  Psychomotor Behavior:  no evidence of tardive dyskinesia, dystonia, or tics.   Throught Process:  linear  Associations:  no loose associations  Thought Content:  no evidence of suicidal ideation or homicidal ideation, auditory hallucinations present and Paranoia wasMild   Insight:  partial  Judgement:  limited  Oriented to:  time, person, and place  Attention Span and Concentration:  limited  Recent and Remote Memory:  intact         DIagnoses:     1. " Schizoaffective disorder - bipolar type  2. PTSD  3. Suspect borderline personality disorder.   4. History of Marijuana use disorder.   5. History of opiate use disorder         Plan:   1. Biological treatments:  -- Increase the evening dose of stelazine for psychosis; change prn zyprexa to prn stelazine to help assess the effective dose needed.   -- cont the higher dose of lithium for mood stabilization; check a serum level soon  -- trial of propranolol to help determine if restlessness could be secondary to akathisia although this has been a recurring issue since her admission. She may be experiencing psychomotor activation for manic like symptoms as well for which we are pursuing treatment with lithium.  -- Continue prazosin for PTSD related nightmares;     2. Psychosocial treatments:   --addressed with SW consult and groups  -- psychology consult for neuropsychological testing completed: I reviewed the results with her today and she was appreciative. Provide educational handouts on her diagnosis tomorrow  --agreeable for IRTS referral     3. Dispo:  -- She is under full commitment status

## 2017-05-30 NOTE — PLAN OF CARE
Problem: General Plan of Care (Inpatient Behavioral)  Goal: Team Discussion  Team Plan:   Outcome: Improving  BEHAVIORAL TEAM DISCUSSION     Participants:   Dr. Santana, Angella Palumbo RN and Tejal EGAN     Progress:   Pt is more organized and cooperative. Pt is jovial, joking and asking if she can leave today.  Pt received a full commitment to the Commissioner only in court today.  Pt has not had any further bouts of agitation with seclusion since 5/23/17.  Pt was becoming increasing frenetic when drinking Red Bull and has agreed to stop drinking this.  Referrals made to many IRTS.        Continued Stay Criteria/Rationale:   Pt continues with symptoms of psychoses and needs further stabilization        Medical/Physical:   Has had numbness in leg and has had internal medicine consult     Precautions:   Behavioral Orders   Procedures     Code 1 - Restrict to Unit     Fall precautions     [place]     MMPI 2     Routine Programming       As clinically indicated     Status 15       Every 15 minutes.     Plan:   Provisional discharge when pt is well  Discharge to IRTS  Encourage groups     Rationale for change in precautions or plan:   None

## 2017-05-30 NOTE — PROGRESS NOTES
Pt came back from court today with an order for a full commitment to the Commissioner.  Pt will be provisionally discharged from here and when I send this to the court, it will be deemed a dual commitment.  I spoke with the court . She is making a referral to long term case management  I made referrals to [facility] and [facility] facilities.  I met with pt and gave her this update.    [facility]      Facility 1.  [address] [address]      Facility 2:  [address] [address]  Phone: [phone]  Fax: [phone]    Hiram RN - [phone]    Admissions     Need to send referral form in     Fax: [phone]   5/30- talked live and sent records, told to check back in a couple of days           [facility]    Shannan Cristian Sullivantt [facility] [phone]    Need approval from [facility]  Urgent Care Team :  [phone]      Urgent Care:  [phone] This is a crisis home.   [facility]  [address] [place] [address]    Shannon W [phone] Fax: [phone] 5/30 - left vm and faxed records     [facility]  [address] [place] [address]   [phone] Fax: [phone] 5/30 - left vm and faxed records   [facility]  [address] [place] [address]      Black ([phone]) [phone] Fax: [phone] 5/30 - left vm and faxed records   [facility]  [facility]   [phone] Fax: [phone] 5/30 - talked live to [place] and faxed records   [facility] - Crisis Bed   [place]  [address] [place] [address] [phone] Fax: [phone] This is a crisis home.   [facility]   [address] [place] [address]   [phone] 5/30 -talked to live person and faxed records   [facility] -6 Crisis Beds  [place] [address] [place] [address]  [employer] at  [facility].  [phone] or [phone].   [phone]           This is a crisis home.   [place] [address] [place] [address]    [place] [phone] 5/30 - left vm, faxed records,    [facility]  [address] [facility] [address]   [phone] Fax: [phone] 5/30 - left vm for , faxed records

## 2017-05-31 LAB — LITHIUM SERPL-SCNC: 0.4 MMOL/L (ref 0.6–1.2)

## 2017-05-31 PROCEDURE — 12400001 ZZH R&B MH UMMC

## 2017-05-31 PROCEDURE — 80178 ASSAY OF LITHIUM: CPT | Performed by: PSYCHIATRY & NEUROLOGY

## 2017-05-31 PROCEDURE — 25000132 ZZH RX MED GY IP 250 OP 250 PS 637: Performed by: PSYCHIATRY & NEUROLOGY

## 2017-05-31 PROCEDURE — 36415 COLL VENOUS BLD VENIPUNCTURE: CPT | Performed by: PSYCHIATRY & NEUROLOGY

## 2017-05-31 PROCEDURE — 99232 SBSQ HOSP IP/OBS MODERATE 35: CPT | Performed by: PSYCHIATRY & NEUROLOGY

## 2017-05-31 RX ORDER — LITHIUM CARBONATE 300 MG/1
300 TABLET, FILM COATED, EXTENDED RELEASE ORAL EVERY MORNING
Status: DISCONTINUED | OUTPATIENT
Start: 2017-06-01 | End: 2017-06-27 | Stop reason: HOSPADM

## 2017-05-31 RX ORDER — PROPRANOLOL HYDROCHLORIDE 10 MG/1
10 TABLET ORAL 2 TIMES DAILY
Status: DISCONTINUED | OUTPATIENT
Start: 2017-06-01 | End: 2017-06-05

## 2017-05-31 RX ORDER — LITHIUM CARBONATE 300 MG/1
600 TABLET, FILM COATED, EXTENDED RELEASE ORAL EVERY EVENING
Status: DISCONTINUED | OUTPATIENT
Start: 2017-05-31 | End: 2017-06-27 | Stop reason: HOSPADM

## 2017-05-31 RX ADMIN — LORAZEPAM 0.5 MG: 0.5 TABLET ORAL at 09:24

## 2017-05-31 RX ADMIN — LITHIUM CARBONATE 300 MG: 300 TABLET, EXTENDED RELEASE ORAL at 07:58

## 2017-05-31 RX ADMIN — TRIFLUOPERAZINE HYDROCHLORIDE 2 MG: 2 TABLET, FILM COATED ORAL at 07:58

## 2017-05-31 RX ADMIN — LITHIUM CARBONATE 600 MG: 300 TABLET, EXTENDED RELEASE ORAL at 21:58

## 2017-05-31 RX ADMIN — PRAZOSIN HYDROCHLORIDE 2 MG: 2 CAPSULE ORAL at 21:58

## 2017-05-31 RX ADMIN — NICOTINE POLACRILEX 4 MG: 2 GUM, CHEWING ORAL at 14:21

## 2017-05-31 RX ADMIN — TRIFLUOPERAZINE HYDROCHLORIDE 4 MG: 2 TABLET, FILM COATED ORAL at 18:14

## 2017-05-31 RX ADMIN — TRIFLUOPERAZINE HYDROCHLORIDE 2 MG: 2 TABLET, FILM COATED ORAL at 14:19

## 2017-05-31 RX ADMIN — TRAZODONE HYDROCHLORIDE 200 MG: 100 TABLET ORAL at 21:58

## 2017-05-31 RX ADMIN — PROPRANOLOL HYDROCHLORIDE 20 MG: 20 TABLET ORAL at 07:58

## 2017-05-31 RX ADMIN — NICOTINE POLACRILEX 4 MG: 2 GUM, CHEWING ORAL at 07:58

## 2017-05-31 RX ADMIN — TRIFLUOPERAZINE HYDROCHLORIDE 4 MG: 2 TABLET, FILM COATED ORAL at 21:58

## 2017-05-31 RX ADMIN — ALUMINUM HYDROXIDE, MAGNESIUM HYDROXIDE, AND DIMETHICONE 30 ML: 400; 400; 40 SUSPENSION ORAL at 17:13

## 2017-05-31 RX ADMIN — IBUPROFEN 400 MG: 200 TABLET, FILM COATED ORAL at 17:13

## 2017-05-31 RX ADMIN — IBUPROFEN 400 MG: 200 TABLET, FILM COATED ORAL at 11:33

## 2017-05-31 ASSESSMENT — ACTIVITIES OF DAILY LIVING (ADL)
ORAL_HYGIENE: INDEPENDENT
ORAL_HYGIENE: INDEPENDENT
HYGIENE/GROOMING: INDEPENDENT
LAUNDRY: WITH SUPERVISION
DRESS: INDEPENDENT
DRESS: INDEPENDENT
GROOMING: INDEPENDENT

## 2017-05-31 NOTE — PROGRESS NOTES
Patient visibly agitated, has punched the wall.Patient demanded an injection of olanzapine. Patient has been polite and pleasant to this writer but has used profanity and threatened to punch another staff person because her colored pencils were removed from her room per hospital policy.

## 2017-05-31 NOTE — PROGRESS NOTES
"[facility], [place]   Psychiatric Progress Note         Interim History and Subjective Reports:   The patient's care was discussed with the treatment team during the daily team meeting.  Staff reports:  No acute issues, attended court today    No complaints today. She wanted to discuss the possibility of being discharged by the weekend to attend her brother's wedding.  Auditory hallucinations occur throughout the day however decreased in intensity.  She is appreciative of the medications.  No side effects reported.  Patient denies SI/HI.  She denies VH. Paranoia is moderate today.          Scheduled Medications:       [START ON 6/1/2017] propranolol  10 mg Oral BID     [START ON 6/1/2017] lithium  300 mg Oral QAM     lithium  600 mg Oral QPM     traZODone  200 mg Oral At Bedtime     trifluoperazine  2 mg Oral BID     trifluoperazine  4 mg Oral At Bedtime     prazosin  2 mg Oral At Bedtime          Allergies:      Allergies   Allergen Reactions     Adhesive Tape Hives     Prednisone Other (See Comments) and Hives     Suicidal ideation     Droperidol Anxiety          Labs:     Recent Results (from the past 24 hour(s))   Lithium level    Collection Time: 05/31/17  7:43 AM   Result Value Ref Range    Lithium Level 0.4 (L) 0.6 - 1.2 mmol/L          Vitals:   /80 (BP Location: Right arm)  Pulse 95  Temp 97  F (36.1  C)  Resp 16  Ht 1.676 m (5' 6\")  Wt 90.7 kg (200 lb)  SpO2 96%  BMI 32.28 kg/m2  Weight: 200 lbs 0 oz          Height: 5' 6\"           Body mass index is 32.28 kg/(m^2).        Mental Status Examination:   Appearance: awake, alert and adequately groomed  Attitude:  cooperative  Eye Contact:  poor   Mood:  better  Affect:  appropriate and in normal range  Speech:  clear, coherent  Psychomotor Behavior:  no evidence of tardive dyskinesia, dystonia, or tics.   Throught Process:  linear  Associations:  no loose associations  Thought Content:  no evidence of suicidal " ideation or homicidal ideation, auditory hallucinations present and Paranoia wasMild   Insight:  partial  Judgement:  limited  Oriented to:  time, person, and place  Attention Span and Concentration:  limited  Recent and Remote Memory:  intact         DIagnoses:     1. Schizoaffective disorder - bipolar type  2. PTSD  3. Suspect borderline personality disorder.   4. History of Marijuana use disorder.   5. History of opiate use disorder         Plan:   1. Biological treatments:  --Cont stelazine for psychosis; tolerating the higher dose well  -- increase lithium for mood stabilization; serum level reviewed and low at 0.4  -- reduce propranolol to 10mg BID noting low bp at the higher dose.   -- Continue prazosin for PTSD related nightmares;     2. Psychosocial treatments:   --addressed with SW consult and groups  --agreeable for IRTS referral     3. Dispo:  -- She is under full commitment status

## 2017-05-31 NOTE — PROGRESS NOTES
Pt. Was upset environmental checks had been done. Staff took out a box of colored pencils, a paper clip, and markers in a plastic case. Pt. Stated that she wanted to punch this staff in the face.

## 2017-05-31 NOTE — PROGRESS NOTES
Jacob from [facility] - called - [phone]. He says pt can come for an interview and be admitted next week. I left a vm for Sergio at [facility] asking if she can transport this pt since she is committed.

## 2017-05-31 NOTE — PROGRESS NOTES
"Patient complaining of some chest pain discomfort. Patient believes that it may be from her new medication propranolol. Patient requested ibuprofen, and writer suggested Maalox in case she was experiencing indigestion.     ./81  Pulse 87  Temp 97  F (36.1  C)  Resp 24  Ht 1.676 m (5' 6\")  Wt 90.7 kg (200 lb)  SpO2 98%  BMI 32.28 kg/m2    "

## 2017-05-31 NOTE — PLAN OF CARE
"Problem: Manic Symptoms  Goal: Manic Symptoms  Signs and symptoms of listed problems will be absent or manageable.   Patient will report less lability of mood  Patient will maintain appropriate boundaries with peers and staff  Patient speech will be notable for appropriate content, rate, and volume  Patient will report less difficulty falling and staying asleep   Outcome: No Change  Patient has been agitated and irritable this evening. Ayana had been at Court earlier today and stated to this writer ;\"I'm going to be here for another four to six weeks.\"  Ayana has been pacing all over the department, she walks at a fast pace, swinging her arms listening to music. Patient has been encouraged to use the exercise bike. Ayana has been at the medication window frequently to request for medication for anxiety and agitation. Ayana denies SI/SIB/HI/ AH/VH.       "

## 2017-05-31 NOTE — PROGRESS NOTES
05/31/17 1400   Behavioral Health   Hallucinations denies / not responding to hallucinations   Thinking intact   Orientation person: oriented;place: oriented;date: oriented;time: oriented   Memory baseline memory   Insight poor   Judgement impaired   Eye Contact at examiner   Affect full range affect   Mood mood is calm   ADL Assessment (WDL) WDL   Suicidality (WDL) WDL   Self Injury (WDL) WDL   Activity other (see comment)  (Social with peers)   Speech (WDL) WDL   Medication Sensitivity (WDL) WDL   Psychomotor Gait (WDL) WDL   Overt Agression (WDL) WDL   Psycho Education   Type of Intervention 1:1 intervention   Response participates, initiates socially appropriate   Hours 0.5   Treatment Detail Warning Signs   Activities of Daily Living   Hygiene/Grooming independent   Oral Hygiene independent   Dress independent   Laundry with supervision   Room Organization independent

## 2017-06-01 PROCEDURE — 25000132 ZZH RX MED GY IP 250 OP 250 PS 637: Performed by: PSYCHIATRY & NEUROLOGY

## 2017-06-01 PROCEDURE — 99232 SBSQ HOSP IP/OBS MODERATE 35: CPT | Performed by: PSYCHIATRY & NEUROLOGY

## 2017-06-01 PROCEDURE — 12400001 ZZH R&B MH UMMC

## 2017-06-01 PROCEDURE — S0166 INJ OLANZAPINE 2.5MG: HCPCS | Performed by: PSYCHIATRY & NEUROLOGY

## 2017-06-01 PROCEDURE — 25000125 ZZHC RX 250: Performed by: PSYCHIATRY & NEUROLOGY

## 2017-06-01 RX ADMIN — ONDANSETRON 4 MG: 4 TABLET, ORALLY DISINTEGRATING ORAL at 07:20

## 2017-06-01 RX ADMIN — LITHIUM CARBONATE 300 MG: 300 TABLET, EXTENDED RELEASE ORAL at 07:55

## 2017-06-01 RX ADMIN — PROPRANOLOL HYDROCHLORIDE 10 MG: 10 TABLET ORAL at 07:55

## 2017-06-01 RX ADMIN — LITHIUM CARBONATE 600 MG: 300 TABLET, EXTENDED RELEASE ORAL at 20:01

## 2017-06-01 RX ADMIN — PRAZOSIN HYDROCHLORIDE 2 MG: 2 CAPSULE ORAL at 20:01

## 2017-06-01 RX ADMIN — LORAZEPAM 0.5 MG: 0.5 TABLET ORAL at 10:44

## 2017-06-01 RX ADMIN — NICOTINE POLACRILEX 4 MG: 2 GUM, CHEWING ORAL at 16:39

## 2017-06-01 RX ADMIN — TRAZODONE HYDROCHLORIDE 200 MG: 100 TABLET ORAL at 20:04

## 2017-06-01 RX ADMIN — NICOTINE POLACRILEX 4 MG: 2 GUM, CHEWING ORAL at 20:01

## 2017-06-01 RX ADMIN — NICOTINE POLACRILEX 4 MG: 2 GUM, CHEWING ORAL at 07:55

## 2017-06-01 RX ADMIN — TRIFLUOPERAZINE HYDROCHLORIDE 4 MG: 2 TABLET, FILM COATED ORAL at 20:04

## 2017-06-01 RX ADMIN — TRIFLUOPERAZINE HYDROCHLORIDE 2 MG: 2 TABLET, FILM COATED ORAL at 09:07

## 2017-06-01 RX ADMIN — OLANZAPINE 10 MG: 10 INJECTION, POWDER, LYOPHILIZED, FOR SOLUTION INTRAMUSCULAR at 12:20

## 2017-06-01 RX ADMIN — PROPRANOLOL HYDROCHLORIDE 10 MG: 10 TABLET ORAL at 14:45

## 2017-06-01 RX ADMIN — LORAZEPAM 0.5 MG: 0.5 TABLET ORAL at 21:20

## 2017-06-01 ASSESSMENT — ACTIVITIES OF DAILY LIVING (ADL)
ORAL_HYGIENE: INDEPENDENT
DRESS: STREET CLOTHES;INDEPENDENT
GROOMING: INDEPENDENT
GROOMING: INDEPENDENT
DRESS: INDEPENDENT;SCRUBS (BEHAVIORAL HEALTH)
LAUNDRY: WITH SUPERVISION
ORAL_HYGIENE: INDEPENDENT
LAUNDRY: WITH SUPERVISION

## 2017-06-01 NOTE — PLAN OF CARE
"Problem: Manic Symptoms  Goal: Manic Symptoms  Signs and symptoms of listed problems will be absent or manageable.   Patient will report less lability of mood  Patient will maintain appropriate boundaries with peers and staff  Patient speech will be notable for appropriate content, rate, and volume  Patient will report less difficulty falling and staying asleep   Outcome: Therapy, progress toward functional goals is gradual  Pt out in common areas 2/3 of shift. Pt reporting anxiety this morning receiving ativan prn with morning medications. 2 noon pt reporting acute agitation and anxiety after finding out that a peer was being discharged and hearing about a possible glitch with funding for IRTS. Pt requesting IM Zyprexa for anxiety and aggitation. \"I feel like jumping out of my skin. I feel like I could explode.\" pt was able to process her feelings and reported feeling somewhat calmer after our conversation, maintained an uncomfortable level of agitation. Pt recieved IM Zyprexa per her request. Pt reported significant relief. Pt observed sleeping in her room 1 hour after medication. Pt reporting her voices are louder and more irritating today. Pt was social with peers. No threatening or self injurious behaviors observed today.         "

## 2017-06-01 NOTE — PLAN OF CARE
Problem: General Plan of Care (Inpatient Behavioral)  Goal: Individualization/Patient Specific Goal (IP Behavioral)  The patient and/or their representative will achieve their patient-specific goals related to the plan of care.    The patient-specific goals include:  Illness Management Recovery model: Objectives    Patient will identify reason(s) for hospitalization from their perspective.  Patient will identify a minimum of three goals for discharge.  Patient will identify a minimum of three triggers that may increase their symptoms.  Patient will identify a minimum of three coping skills they can do to stay well.   Patient will identify their support system to demonstrate readiness for discharge.   Outcome: Therapy, progress toward functional goals is gradual  Illness Management Recovery model:  Feedback.     Patient identified the following people they would like to receive feedback from if/when they see early warning signs:     Friend(s) couple of close friends     Family(s): parents     Partner/Spouse: no     Support Group Member(s): no     Co-Worker(s): no

## 2017-06-01 NOTE — PROGRESS NOTES
"   05/31/17 2220   Behavioral Health   Hallucinations auditory   Thinking intact   Orientation person: oriented;place: oriented;date: oriented;time: oriented   Memory baseline memory   Insight poor   Judgement impaired   Eye Contact at examiner   Affect full range affect   Mood mood is calm   Physical Appearance/Attire attire appropriate to age and situation   Hygiene well groomed   Suicidality other (see comments)  (Denies)   Self Injury other (see comment)  (Denies)   Activity other (see comment)  (Present in milieu, engaged with peers)   Speech clear;coherent   Medication Sensitivity no observed side effects;no stated side effects   Psychomotor / Gait balanced;steady   Psycho Education   Type of Intervention 1:1 intervention   Response participates, initiates socially appropriate   Hours 0.5   Treatment Detail Check in   Activities of Daily Living   Hygiene/Grooming independent   Oral Hygiene independent   Dress independent   Room Organization independent     Ayana was present in the milieu and attended community meeting and a relaxation group. She denied SI/SIB but stated she was experiencing auditory hallucinations (described as \"mumbo jumbo\"). Although she was concerned about chest pains early in the shift, she later stated she was having a better day with reduced anxiety and depression overall. No further concerns at this time.  "

## 2017-06-02 PROCEDURE — 25000132 ZZH RX MED GY IP 250 OP 250 PS 637: Performed by: PSYCHIATRY & NEUROLOGY

## 2017-06-02 PROCEDURE — 12400001 ZZH R&B MH UMMC

## 2017-06-02 PROCEDURE — H2032 ACTIVITY THERAPY, PER 15 MIN: HCPCS

## 2017-06-02 PROCEDURE — 25000125 ZZHC RX 250: Performed by: PSYCHIATRY & NEUROLOGY

## 2017-06-02 RX ADMIN — TRAZODONE HYDROCHLORIDE 200 MG: 100 TABLET ORAL at 20:52

## 2017-06-02 RX ADMIN — ACETAMINOPHEN 650 MG: 325 TABLET, FILM COATED ORAL at 20:22

## 2017-06-02 RX ADMIN — PROPRANOLOL HYDROCHLORIDE 10 MG: 10 TABLET ORAL at 08:49

## 2017-06-02 RX ADMIN — LITHIUM CARBONATE 300 MG: 300 TABLET, EXTENDED RELEASE ORAL at 08:49

## 2017-06-02 RX ADMIN — NICOTINE 1 PATCH: 7 PATCH TRANSDERMAL at 14:06

## 2017-06-02 RX ADMIN — OLANZAPINE 10 MG: 10 TABLET, FILM COATED ORAL at 22:51

## 2017-06-02 RX ADMIN — LORAZEPAM 0.5 MG: 0.5 TABLET ORAL at 11:14

## 2017-06-02 RX ADMIN — ONDANSETRON 4 MG: 4 TABLET, ORALLY DISINTEGRATING ORAL at 17:58

## 2017-06-02 RX ADMIN — PRAZOSIN HYDROCHLORIDE 2 MG: 2 CAPSULE ORAL at 20:21

## 2017-06-02 RX ADMIN — IBUPROFEN 400 MG: 200 TABLET, FILM COATED ORAL at 17:24

## 2017-06-02 RX ADMIN — NICOTINE POLACRILEX 4 MG: 2 GUM, CHEWING ORAL at 16:36

## 2017-06-02 RX ADMIN — LORAZEPAM 0.5 MG: 0.5 TABLET ORAL at 19:03

## 2017-06-02 RX ADMIN — LITHIUM CARBONATE 600 MG: 300 TABLET, EXTENDED RELEASE ORAL at 20:22

## 2017-06-02 RX ADMIN — TRIFLUOPERAZINE HYDROCHLORIDE 2 MG: 2 TABLET, FILM COATED ORAL at 08:49

## 2017-06-02 RX ADMIN — TRIFLUOPERAZINE HYDROCHLORIDE 2 MG: 2 TABLET, FILM COATED ORAL at 14:06

## 2017-06-02 RX ADMIN — NICOTINE POLACRILEX 4 MG: 2 GUM, CHEWING ORAL at 08:52

## 2017-06-02 RX ADMIN — PROPRANOLOL HYDROCHLORIDE 10 MG: 10 TABLET ORAL at 14:05

## 2017-06-02 RX ADMIN — TRIFLUOPERAZINE HYDROCHLORIDE 4 MG: 2 TABLET, FILM COATED ORAL at 20:52

## 2017-06-02 ASSESSMENT — ACTIVITIES OF DAILY LIVING (ADL)
ORAL_HYGIENE: INDEPENDENT
GROOMING: INDEPENDENT
ORAL_HYGIENE: INDEPENDENT
DRESS: INDEPENDENT
DRESS: STREET CLOTHES;INDEPENDENT
LAUNDRY: UNABLE TO COMPLETE
GROOMING: INDEPENDENT

## 2017-06-02 NOTE — PROGRESS NOTES
"Pt was in the milieu. She attended groups.  Pt denies dep, SI, SIB.  Pt confirms anx 3 of 10.  \"I'm just tired of being here.  I feel like I've been here for ever!\"  Pt remains pleasant despite her frustration.  \"  "

## 2017-06-02 NOTE — PROGRESS NOTES
Patient c/o feeling very nauseated. Was chewing her nicotine gum, patient pale in color. Patient instructed to spit out gum, and Nicotine patch removed. Vitals per flowsheet. Patient given ODT zofran for nausea, approximately  Five minutes after zofran dissolved patient experienced large emesis.  Patient continues to c/o stomach pain. Will continue to monitor.

## 2017-06-02 NOTE — PROGRESS NOTES
I received a call from Jacob at [facility] yesterday saying that the pt's insurance is the only insurance -[employer] - that does not usually pay for their facility. Pt and I called the insurance company. I called Sergio at [facility] who had made the referral to see if economic assistance could be changed for her. Sergio did not comment on [facility] staff bringing her to an interview.  Jacob did submit a request to [facility] for authorization yesterday.  I called IRTS for update.  I reviewed the clinical notes that I might send to show that pt is more stable but they do not reflect this. Pt is under the impression as our some staff that she is just waiting placement but the pt's symptoms are still severe and medications are being adjusted and pt should eb given this feedback. Pt needs to be encouraged to participate in treatment and work on symptom management rather than just hoping to leave as soon as possible.  Pt has gone to 3 groups this week.  Pt spends time pacing with headphones or coloring.       IRTS Work Sheet   IRTS Facility Phone: Fax: Tru   [facility]  [address] [place] [address]      o Loree Lewis [phone]  email address is ;      [email or web address]       Fax:      [phone]                                                                                                                    [facility]  [address] [place] [address]     Ph: [phone] Fax:[phone]      [facility]  Bill Kiersten House  [address] [facility].   [phone]     Fax:[phone]    [facility]  Katty Young  [address] [place] [address]        Sherley  [phone] Fax:[phone]    [facility]  [place]  [address] [place] [address]        Jaime [phone] Fax:[phone]    [facility]  [place]  [address] [place], [place]    santiago Ba([phone]) [phone]  Fax:[phone]    [facility]      Facility 1.  [place] [address] [facility] [address]      Facility 2:  [place] [address] [place] [address]  Phone: [phone]  Fax: [phone]    Hiram RN - [phone]    Admissions     Need to send referral form in     Fax: [phone]   5/30- talked live and sent records, told to check back in a couple of days       [facility]  [address] [place] [address]      April [phone] Fax: [phone]      [facility]    [facility] [place]  [address] [place], [place]    Jacob Anatoly    [phone] Fax: [phone]    [facility]    [address] [phone]    Need approval from [facility]  Urgent Care Team :  [phone]      Urgent Care:  [phone] This is a crisis home.   [facility]  [address] [place] [address]    Shannon PETER [phone] Fax: [phone] 5/30 - left vm and faxed records  5/31- received return vm saying they have the records and will be in touch  6/2 - #43 on the list, update this every week     [facility]  [address] [place] [address]    Katiuska Thompson     [phone] Fax: [phone] 5/30 - left vm and faxed records  5/31- received vm saying to contact them  6/2 -left vm for Ramone asking for status update   [facility]  [address] [place] [address]      Black ([phone]) [phone] Fax: [phone] 5/30 - left vm and faxed records  5/31 - talked live to Will who said that they wanted pt to be more stable  6/1 - Informed pt and she said she really wants to go there. I said I would send updated progress notes.  6/2- I reviewed latest notes to possibly send and the last RN note stills notes a high level of agitation so I did not send   [facility]    Pat [phone] Fax: [phone] 5/30 - talked  live to [place] and faxed records  6/1 - vm from [facility] saying there are no openings and there will not be openings for along time   [facility] - Crisis Bed   [place]  [address] [place] [address] [phone] Fax: [phone] This is a crisis home.   [facility] Options   [address] [facility]   [phone] 5/30 -talked to live person and faxed records  5/31 - received vm saying to contact them  6/2 -called home and was told to call Jaimie on Monday   [facility] -6 Crisis Beds  [place] [address] [place] [address]  [facility] at [facility].  [phone] or [phone].   [phone]           This is a crisis home.   [facility] - IRTS Beds  [place] [address] [place] [address]    Yaquelin [phone] 5/30 - left vm, faxed records,   6/2 - left vm for Yaquelin asking for update   [facility]  [address] [facility] [address]   [phone] Fax: [phone] 5/30 - left vm for , faxed records  6/2 - left vm for admission director

## 2017-06-02 NOTE — PROGRESS NOTES
06/01/17 8458   Behavioral Health   Hallucinations auditory   Thinking poor concentration   Orientation time: oriented;date: oriented;place: oriented;person: oriented   Memory baseline memory   Insight insight appropriate to situation;other (see comment)  (IMR)   Judgement impaired   Eye Contact at examiner   Affect tense   Mood anxious;irritable   Physical Appearance/Attire neat;attire appropriate to age and situation;appears stated age   Hygiene well groomed   Suicidality other (see comments)  (None Stated or Observed)   Self Injury other (see comment)  (None Stated or Observed)   Activity isolative;withdrawn   Speech clear;coherent   Medication Sensitivity no observed side effects;no stated side effects   Psychomotor / Gait steady;balanced

## 2017-06-02 NOTE — PROGRESS NOTES
"[facility] [place]   Psychiatric Progress Note         Interim History and Subjective Reports:   The patient's care was discussed with the treatment team during the daily team meeting.  Staff reports:  No acute issues, requested an intramuscular injection of Zyprexa for psychosis.    She was laying in bed sleeping  No complaints today.  Auditory hallucinations occur throughout the day however decreased in intensity.  She is appreciative of the medications.  No side effects reported.  Patient denies SI/HI.  She denies VH. Paranoia is moderate today.          Scheduled Medications:       propranolol  10 mg Oral BID     lithium  300 mg Oral QAM     lithium  600 mg Oral QPM     traZODone  200 mg Oral At Bedtime     trifluoperazine  2 mg Oral BID     trifluoperazine  4 mg Oral At Bedtime     prazosin  2 mg Oral At Bedtime          Allergies:      Allergies   Allergen Reactions     Adhesive Tape Hives     Prednisone Other (See Comments) and Hives     Suicidal ideation     Droperidol Anxiety          Labs:     No results found for this or any previous visit (from the past 24 hour(s)).       Vitals:   /73  Pulse 81  Temp 97.6  F (36.4  C) (Oral)  Resp 16  Ht 1.676 m (5' 6\")  Wt 90.7 kg (200 lb)  SpO2 98%  BMI 32.28 kg/m2  Weight: 200 lbs 0 oz          Height: 5' 6\"           Body mass index is 32.28 kg/(m^2).        Mental Status Examination:   Appearance: awake, alert and adequately groomed  Attitude:  cooperative  Eye Contact:  poor   Mood:  better  Affect:  appropriate and in normal range  Speech:  clear, coherent  Psychomotor Behavior:  no evidence of tardive dyskinesia, dystonia, or tics.   Throught Process:  linear  Associations:  no loose associations  Thought Content:  no evidence of suicidal ideation or homicidal ideation, auditory hallucinations present and Paranoia wasMild   Insight:  partial  Judgement:  limited  Oriented to:  time, person, and place  Attention Span and " Concentration:  limited  Recent and Remote Memory:  intact         DIagnoses:     1. Schizoaffective disorder - bipolar type  2. PTSD  3. Suspect borderline personality disorder.   4. History of Marijuana use disorder.   5. History of opiate use disorder         Plan:   1. Biological treatments:  --Cont stelazine for psychosis; tolerating the higher dose well  -- Continue a higher dose of lithium for mood stabilization; recheck the blood level next week  -- tolerating the lower dose of propranolol  -- Continue prazosin for PTSD related nightmares;     2. Psychosocial treatments:   --addressed with SW consult and groups  --agreeable for IRTS referral     3. Dispo:  -- She is under full commitment status

## 2017-06-03 PROCEDURE — 12400001 ZZH R&B MH UMMC

## 2017-06-03 PROCEDURE — 25000132 ZZH RX MED GY IP 250 OP 250 PS 637: Performed by: PSYCHIATRY & NEUROLOGY

## 2017-06-03 RX ADMIN — TRAZODONE HYDROCHLORIDE 200 MG: 100 TABLET ORAL at 20:25

## 2017-06-03 RX ADMIN — OLANZAPINE 10 MG: 10 TABLET, FILM COATED ORAL at 21:35

## 2017-06-03 RX ADMIN — LORAZEPAM 0.5 MG: 0.5 TABLET ORAL at 13:09

## 2017-06-03 RX ADMIN — NICOTINE POLACRILEX 4 MG: 2 GUM, CHEWING ORAL at 20:04

## 2017-06-03 RX ADMIN — PRAZOSIN HYDROCHLORIDE 2 MG: 2 CAPSULE ORAL at 20:25

## 2017-06-03 RX ADMIN — TRIFLUOPERAZINE HYDROCHLORIDE 4 MG: 2 TABLET, FILM COATED ORAL at 20:24

## 2017-06-03 RX ADMIN — TRIFLUOPERAZINE HYDROCHLORIDE 4 MG: 2 TABLET, FILM COATED ORAL at 17:30

## 2017-06-03 RX ADMIN — NICOTINE 1 PATCH: 7 PATCH TRANSDERMAL at 09:39

## 2017-06-03 RX ADMIN — PROPRANOLOL HYDROCHLORIDE 10 MG: 10 TABLET ORAL at 09:07

## 2017-06-03 RX ADMIN — LITHIUM CARBONATE 600 MG: 300 TABLET, EXTENDED RELEASE ORAL at 20:04

## 2017-06-03 RX ADMIN — PROPRANOLOL HYDROCHLORIDE 10 MG: 10 TABLET ORAL at 14:26

## 2017-06-03 RX ADMIN — LORAZEPAM 0.5 MG: 0.5 TABLET ORAL at 22:50

## 2017-06-03 RX ADMIN — TRIFLUOPERAZINE HYDROCHLORIDE 2 MG: 2 TABLET, FILM COATED ORAL at 14:27

## 2017-06-03 RX ADMIN — Medication: at 20:25

## 2017-06-03 RX ADMIN — TRIFLUOPERAZINE HYDROCHLORIDE 2 MG: 2 TABLET, FILM COATED ORAL at 09:07

## 2017-06-03 RX ADMIN — LITHIUM CARBONATE 300 MG: 300 TABLET, EXTENDED RELEASE ORAL at 09:07

## 2017-06-03 ASSESSMENT — ACTIVITIES OF DAILY LIVING (ADL)
LAUNDRY: WITH SUPERVISION
ORAL_HYGIENE: INDEPENDENT
ORAL_HYGIENE: INDEPENDENT
GROOMING: INDEPENDENT
GROOMING: HANDWASHING;SHOWER;INDEPENDENT
DRESS: INDEPENDENT
DRESS: STREET CLOTHES;INDEPENDENT

## 2017-06-03 NOTE — PROGRESS NOTES
"Ayana asked to speak with this writer. She stated \"I'm very aggravated today, I'm sick of being here and want to go outside.\" Patient offered \"This is so dumb that I am here. All I did was go for a walk. I got a little sunburnt. Big Deal!. I'm just really disappointed that I missed my brother's wedding today. I'm missing my entire summer.\"  Patient unwilling to use any of her coping mechanisms and wanted stellazine for anxiety.  Patient given her request  "

## 2017-06-03 NOTE — PROGRESS NOTES
06/02/17 1920   Behavioral Health   Hallucinations auditory   Thinking distractable   Orientation person: oriented;place: oriented;date: oriented;time: oriented   Memory baseline memory   Insight insight appropriate to situation;insight appropriate to events   Judgement impaired   Eye Contact at examiner   Affect full range affect   Mood anxious   Physical Appearance/Attire attire appropriate to age and situation   Hygiene well groomed   Suicidality other (see comments)  (Denies)   Self Injury other (see comment)  (Denies)   Activity restless   Speech coherent;clear   Medication Sensitivity no observed side effects;no stated side effects   Psychomotor / Gait balanced;steady   Psycho Education   Type of Intervention 1:1 intervention   Response participates, initiates socially appropriate   Hours 0.5   Treatment Detail Check in   Activities of Daily Living   Hygiene/Grooming independent   Oral Hygiene independent   Dress independent   Room Organization independent     Ayana had a goal to attend dance therapy (met) and attended community meeting. She stated she was feeling anxiety 8/10 for a period of the evening, however, this reduced after utilizing a PRN and checking in. She stated she was experiencing negative auditory hallucinations but did not want to share what they were saying. She denied SI/SIB and stated she continues to feel safe. Ayana did not eat dinner and later vomited in her room. Nursing staff followed through with this concern and Ayana returned to the milieu . She is looking forward to her parents coming to visit on the 3rd.

## 2017-06-04 PROCEDURE — 25000132 ZZH RX MED GY IP 250 OP 250 PS 637: Performed by: PSYCHIATRY & NEUROLOGY

## 2017-06-04 PROCEDURE — 12400001 ZZH R&B MH UMMC

## 2017-06-04 RX ADMIN — PROPRANOLOL HYDROCHLORIDE 10 MG: 10 TABLET ORAL at 15:37

## 2017-06-04 RX ADMIN — LITHIUM CARBONATE 300 MG: 300 TABLET, EXTENDED RELEASE ORAL at 09:33

## 2017-06-04 RX ADMIN — TRIFLUOPERAZINE HYDROCHLORIDE 2 MG: 2 TABLET, FILM COATED ORAL at 09:32

## 2017-06-04 RX ADMIN — NICOTINE POLACRILEX 2 MG: 2 GUM, CHEWING ORAL at 20:37

## 2017-06-04 RX ADMIN — TRIFLUOPERAZINE HYDROCHLORIDE 4 MG: 2 TABLET, FILM COATED ORAL at 20:36

## 2017-06-04 RX ADMIN — PRAZOSIN HYDROCHLORIDE 2 MG: 2 CAPSULE ORAL at 20:35

## 2017-06-04 RX ADMIN — TRIFLUOPERAZINE HYDROCHLORIDE 2 MG: 2 TABLET, FILM COATED ORAL at 15:37

## 2017-06-04 RX ADMIN — NICOTINE POLACRILEX 2 MG: 2 GUM, CHEWING ORAL at 18:17

## 2017-06-04 RX ADMIN — NICOTINE 1 PATCH: 7 PATCH TRANSDERMAL at 09:32

## 2017-06-04 RX ADMIN — LORAZEPAM 0.5 MG: 0.5 TABLET ORAL at 21:20

## 2017-06-04 RX ADMIN — BENZTROPINE MESYLATE 1 MG: 1 TABLET ORAL at 16:37

## 2017-06-04 RX ADMIN — OLANZAPINE 10 MG: 10 TABLET, FILM COATED ORAL at 21:20

## 2017-06-04 RX ADMIN — LORAZEPAM 0.5 MG: 0.5 TABLET ORAL at 16:37

## 2017-06-04 RX ADMIN — PROPRANOLOL HYDROCHLORIDE 10 MG: 10 TABLET ORAL at 09:32

## 2017-06-04 RX ADMIN — LITHIUM CARBONATE 600 MG: 300 TABLET, EXTENDED RELEASE ORAL at 20:35

## 2017-06-04 ASSESSMENT — ACTIVITIES OF DAILY LIVING (ADL)
ORAL_HYGIENE: INDEPENDENT
LAUNDRY: WITH SUPERVISION
DRESS: INDEPENDENT
ORAL_HYGIENE: INDEPENDENT
GROOMING: INDEPENDENT
LAUNDRY: WITH SUPERVISION
DRESS: INDEPENDENT
HYGIENE/GROOMING: INDEPENDENT

## 2017-06-04 NOTE — PROGRESS NOTES
06/03/17 9367   Behavioral Health   Hallucinations denies / not responding to hallucinations   Thinking poor concentration   Orientation person: oriented;place: oriented   Memory baseline memory   Insight insight appropriate to situation   Affect full range affect   Mood mood is calm   Suicidality other (see comments)  (pt denies)   Self Injury other (see comment)  (pt denies)   Activity other (see comment)   Activities of Daily Living   Hygiene/Grooming independent   Oral Hygiene independent   Dress independent   Room Organization independent

## 2017-06-04 NOTE — PROGRESS NOTES
06/04/17 1300   Behavioral Health   Hallucinations denies / not responding to hallucinations   Thinking intact   Orientation date: oriented;person: oriented;place: oriented;time: oriented   Memory baseline memory   Insight poor   Judgement impaired   Eye Contact at examiner   Affect blunted, flat   Mood mood is calm;anxious   ADL Assessment (WDL) WDL   Suicidality (WDL) WDL   Self Injury (WDL) WDL   Activity isolative;withdrawn   Speech (WDL) WDL   Medication Sensitivity (WDL) WDL   Medication Sensitivity (Perhaps insomnia)   Psychomotor Gait (WDL) WDL   Overt Agression (WDL) WDL   Psycho Education   Type of Intervention 1:1 intervention   Response participates, initiates socially appropriate   Hours 0.5   Treatment Detail Self Reflection   Activities of Daily Living   Hygiene/Grooming independent   Oral Hygiene independent   Dress independent   Laundry with supervision   Room Organization independent   patient has been isolative and tired this shift, having not slept well last night. Denied any issues and was not really interested in talking with this , claiming sleepiness outweighed any other potential issues.

## 2017-06-05 PROCEDURE — 25000132 ZZH RX MED GY IP 250 OP 250 PS 637: Performed by: PSYCHIATRY & NEUROLOGY

## 2017-06-05 PROCEDURE — 99232 SBSQ HOSP IP/OBS MODERATE 35: CPT | Performed by: PSYCHIATRY & NEUROLOGY

## 2017-06-05 PROCEDURE — 97150 GROUP THERAPEUTIC PROCEDURES: CPT | Mod: GO

## 2017-06-05 PROCEDURE — 12400001 ZZH R&B MH UMMC

## 2017-06-05 RX ORDER — OLANZAPINE 10 MG/2ML
10 INJECTION, POWDER, FOR SOLUTION INTRAMUSCULAR
Status: DISCONTINUED | OUTPATIENT
Start: 2017-06-05 | End: 2017-06-27 | Stop reason: HOSPADM

## 2017-06-05 RX ORDER — TRIFLUOPERAZINE HYDROCHLORIDE 2 MG/1
4 TABLET, FILM COATED ORAL 2 TIMES DAILY
Status: DISCONTINUED | OUTPATIENT
Start: 2017-06-05 | End: 2017-06-20

## 2017-06-05 RX ORDER — OLANZAPINE 10 MG/1
10 TABLET, ORALLY DISINTEGRATING ORAL
Status: DISCONTINUED | OUTPATIENT
Start: 2017-06-05 | End: 2017-06-27 | Stop reason: HOSPADM

## 2017-06-05 RX ORDER — TRIFLUOPERAZINE HYDROCHLORIDE 2 MG/1
2 TABLET, FILM COATED ORAL DAILY
Status: DISCONTINUED | OUTPATIENT
Start: 2017-06-05 | End: 2017-06-20

## 2017-06-05 RX ADMIN — TRIFLUOPERAZINE HYDROCHLORIDE 2 MG: 2 TABLET, FILM COATED ORAL at 13:30

## 2017-06-05 RX ADMIN — NICOTINE POLACRILEX 2 MG: 2 GUM, CHEWING ORAL at 11:56

## 2017-06-05 RX ADMIN — LORAZEPAM 0.5 MG: 0.5 TABLET ORAL at 19:19

## 2017-06-05 RX ADMIN — PRAZOSIN HYDROCHLORIDE 2 MG: 2 CAPSULE ORAL at 20:35

## 2017-06-05 RX ADMIN — PROPRANOLOL HYDROCHLORIDE 10 MG: 10 TABLET ORAL at 09:05

## 2017-06-05 RX ADMIN — LITHIUM CARBONATE 300 MG: 300 TABLET, EXTENDED RELEASE ORAL at 09:05

## 2017-06-05 RX ADMIN — NICOTINE POLACRILEX 2 MG: 2 GUM, CHEWING ORAL at 16:55

## 2017-06-05 RX ADMIN — LITHIUM CARBONATE 600 MG: 300 TABLET, EXTENDED RELEASE ORAL at 20:36

## 2017-06-05 RX ADMIN — IBUPROFEN 400 MG: 200 TABLET, FILM COATED ORAL at 18:28

## 2017-06-05 RX ADMIN — OLANZAPINE 10 MG: 10 TABLET, ORALLY DISINTEGRATING ORAL at 20:36

## 2017-06-05 RX ADMIN — NICOTINE POLACRILEX 2 MG: 2 GUM, CHEWING ORAL at 13:30

## 2017-06-05 RX ADMIN — TRIFLUOPERAZINE HYDROCHLORIDE 2 MG: 2 TABLET, FILM COATED ORAL at 09:05

## 2017-06-05 RX ADMIN — NICOTINE POLACRILEX 2 MG: 2 GUM, CHEWING ORAL at 18:28

## 2017-06-05 RX ADMIN — NICOTINE 1 PATCH: 7 PATCH TRANSDERMAL at 09:05

## 2017-06-05 RX ADMIN — TRIFLUOPERAZINE HYDROCHLORIDE 4 MG: 2 TABLET, FILM COATED ORAL at 20:34

## 2017-06-05 ASSESSMENT — ACTIVITIES OF DAILY LIVING (ADL)
GROOMING: HANDWASHING;SHOWER;INDEPENDENT
DRESS: INDEPENDENT;STREET CLOTHES
ORAL_HYGIENE: INDEPENDENT
DRESS: STREET CLOTHES;INDEPENDENT
GROOMING: INDEPENDENT
LAUNDRY: WITH SUPERVISION
LAUNDRY: WITH SUPERVISION
ORAL_HYGIENE: INDEPENDENT

## 2017-06-05 NOTE — PROGRESS NOTES
"I made calls and updated the work sheet.  Pt asked to see me. She asked if had any news. I gave her a copy of the work sheet.  We talked about her \"having good notes\" and she said she had good notes over the weekend and today. I agreed to send the to [facility].  Pt asked about [facility] and i said I hadn't heard back bu they were most likely waiting for the insurance company to make a decision.     IRTS Work Sheet   IRTS Facility Phone: Fax: Tru   [facility]  [address] [place] [address]      o Loree Lewis [phone]  email address is ;      [email or web address]       Fax:      [phone]                                                                                                                    [facility]  [address] [place] [address]     Ph: [phone] Fax:[phone]      [facility]  [facility]  [address] [facility].   [phone]     Fax:[phone]    [facility]  Alaniz Hector  [address] [place] [address]        Sherley  [phone] Fax:[phone]    [facility]  [place]  [address] [place] [address]        Jaime [phone] Fax:[phone]    [facility]  [address] [place], [place]    santiago Ba([phone]) [phone] Fax:[phone]    [facility]      Facility 1.  [place] [id number] [address]      Facility 2:  [facility]  [address] [place] [address]  Phone: [phone]  Fax: [phone]    [facility] - [phone]    Admissions     Need to send referral form in     Fax: [phone]   5/30- talked live and sent records, told to check back in a couple of days  6/5 - CTC called admissions for an update and left a vm       [facility]  [address] [place], [place] [address] [place] [address]      April [phone] Fax: " [phone]      [facility]    [facility] [address]  [address]  [place]    Jacob Ledbetter    [phone] Fax: [phone]    [facility]    Shannan Noyola [phone]    Need approval from [facility]  Urgent Care Team :  [phone]      Urgent Care:  [phone] This is a crisis home.   [facility]  [address] [place] [address]    Shannon PETER [phone] Fax: [phone] 5/30 - left vm and faxed records  5/31- received return vm saying they have the records and will be in touch  6/2 - #43 on the list, update this every week     [facility]  [address] [place] [address]    Katiuska Donna     [phone] Fax: [phone] 5/30 - left vm and faxed records  5/31- received vm saying to contact them  6/2 -left vm for Ramone asking for status update   [facility]  [address] [place] [address]      Black ([phone]) [phone] Fax: [phone] 5/30 - left vm and faxed records  5/31 - talked live to Will who said that they wanted pt to be more stable  6/1 - Informed pt and she said she really wants to go there. I said I would send updated progress notes.  6/2- I reviewed latest notes to possibly send and the last RN note stills notes a high level of agitation so I did not send   [facility]  [place]    Pat [phone] Fax: [phone] 5/30 - talked live to [place] and faxed records  6/1 - vm from [facility] saying there are no openings and there will not be openings for a long time   [facility] - Crisis Bed   [place]  [address]  [place] [address] [phone] Fax: [phone] This is a crisis home.   [facility]   [address] [facility]   [phone] 5/30 -talked to live person and faxed records  5/31 - received vm saying to contact them  6/2 -called home and was told to call Jaimie on Monday 6/5 - [facility] called Jaimie,opening next week,  records are being reviewed   [facility] -6 Crisis Beds  [place] [address] [place] [address]  [employer] at [facility].  [phone] or [phone].   [phone]           This is a crisis home.   [facility] - IRTS Beds  [facility]  [address] [place] [address]    Yaquelin [phone] 5/30 - left vm, faxed records,   6/2 - left vm for Yaquelin asking for update6/5- received vm from Yaquelin asking for 4 pieces of info, I left Yaquelin a vm with the 4 things she was asking for   [facility]  [address] [facility] [address]   [phone] Fax: [phone] 5/30 - left vm for , faxed records  6/2 - left vm for admission director

## 2017-06-05 NOTE — PLAN OF CARE
Problem: Manic Symptoms  Goal: Manic Symptoms  Signs and symptoms of listed problems will be absent or manageable.   Patient will report less lability of mood  Patient will maintain appropriate boundaries with peers and staff  Patient speech will be notable for appropriate content, rate, and volume  Patient will report less difficulty falling and staying asleep   Outcome: Improving  Ayana appeared calm today. Gets more anxious around bedtime, c/o anxiety about falling a sleep. Declined her trazodone tonight as she complains about how drowsey it makes her feel in the am. Explained to this writer tonight that [facility] available, believes her folks are on board with this idea. Ayana has been jovial this evening and laughing lots. Ayana denies SI/SIB/HI/AH/VH. Ayana hopes to be discharged soon.

## 2017-06-05 NOTE — PLAN OF CARE
Problem: General Plan of Care (Inpatient Behavioral)  Goal: Individualization/Patient Specific Goal (IP Behavioral)  The patient and/or their representative will achieve their patient-specific goals related to the plan of care.    The patient-specific goals include:  Illness Management Recovery model: Objectives    Patient will identify reason(s) for hospitalization from their perspective.  Patient will identify a minimum of three goals for discharge.  Patient will identify a minimum of three triggers that may increase their symptoms.  Patient will identify a minimum of three coping skills they can do to stay well.   Patient will identify their support system to demonstrate readiness for discharge.   Outcome: Therapy, progress towards functional goals is fair  Illness Management Recovery model:  Triggers.      Patient identified the following triggers which may exacerbate their illness:     1. Drug talk  2. Grumpy people  3. Certain staff

## 2017-06-05 NOTE — PROGRESS NOTES
"[facility]   Psychiatric Progress Note         Interim History and Subjective Reports:   The patient's care was discussed with the treatment team during the daily team meeting.  Staff reports:  No acute issues, requested an intramuscular injection of Zyprexa for psychosis.    She was laying in bed sleeping  No complaints today.  Auditory hallucinations occur throughout the day however decreased in intensity.  She is appreciative of the medications.  No side effects reported.  Patient denies SI/HI.  She denies VH. Paranoia is moderate today.          Scheduled Medications:       nicotine   Transdermal Q8H     nicotine   Transdermal Daily     nicotine  1 patch Transdermal Daily     propranolol  10 mg Oral BID     lithium  300 mg Oral QAM     lithium  600 mg Oral QPM     traZODone  200 mg Oral At Bedtime     trifluoperazine  2 mg Oral BID     trifluoperazine  4 mg Oral At Bedtime     prazosin  2 mg Oral At Bedtime          Allergies:      Allergies   Allergen Reactions     Adhesive Tape Hives     Prednisone Other (See Comments) and Hives     Suicidal ideation     Droperidol Anxiety          Labs:     No results found for this or any previous visit (from the past 24 hour(s)).       Vitals:   BP (!) 155/105 (BP Location: Right arm)  Pulse 106  Temp 98.2  F (36.8  C)  Resp 16  Ht 1.676 m (5' 6\")  Wt 90.7 kg (200 lb)  SpO2 97%  BMI 32.28 kg/m2  Weight: 200 lbs 0 oz          Height: 5' 6\"           Body mass index is 32.28 kg/(m^2).        Mental Status Examination:   Appearance: awake, alert and adequately groomed  Attitude:  cooperative  Eye Contact:  poor   Mood:  better  Affect:  appropriate and in normal range  Speech:  clear, coherent  Psychomotor Behavior:  no evidence of tardive dyskinesia, dystonia, or tics.   Throught Process:  linear  Associations:  no loose associations  Thought Content:  no evidence of suicidal ideation or homicidal ideation, auditory hallucinations " present and Paranoia wasMild   Insight:  partial  Judgement:  limited  Oriented to:  time, person, and place  Attention Span and Concentration:  limited  Recent and Remote Memory:  intact         DIagnoses:     1. Schizoaffective disorder - bipolar type  2. PTSD  3. Suspect borderline personality disorder.   4. History of Marijuana use disorder.   5. History of opiate use disorder         Plan:   1. Biological treatments:  -- Cont stelazine for psychosis; increase the morning dose noting frequent usage of prn zyprexa  -- Continue a higher dose of lithium for mood stabilization; recheck the blood level tomorrow  -- d/c propranolol which has not been helpful for restlessness; she did report gaining benefit from a trial of cogentin which will remain available prn.   -- Continue prazosin for PTSD related nightmares;     2. Psychosocial treatments:   --addressed with SW consult and groups  --IRTS referral; awaiting decision from [facility]; funding has been a barrier    3. Dispo:  -- She is under full commitment status. Awaiting further stabilization and acceptance at an ITS facility

## 2017-06-06 PROCEDURE — 12400001 ZZH R&B MH UMMC

## 2017-06-06 PROCEDURE — 99232 SBSQ HOSP IP/OBS MODERATE 35: CPT | Performed by: PSYCHIATRY & NEUROLOGY

## 2017-06-06 PROCEDURE — 25000132 ZZH RX MED GY IP 250 OP 250 PS 637: Performed by: PSYCHIATRY & NEUROLOGY

## 2017-06-06 RX ADMIN — TRIFLUOPERAZINE HYDROCHLORIDE 4 MG: 2 TABLET, FILM COATED ORAL at 20:14

## 2017-06-06 RX ADMIN — TRIFLUOPERAZINE HYDROCHLORIDE 2 MG: 2 TABLET, FILM COATED ORAL at 13:40

## 2017-06-06 RX ADMIN — OLANZAPINE 10 MG: 10 TABLET, ORALLY DISINTEGRATING ORAL at 20:14

## 2017-06-06 RX ADMIN — TRIFLUOPERAZINE HYDROCHLORIDE 4 MG: 2 TABLET, FILM COATED ORAL at 08:13

## 2017-06-06 RX ADMIN — LITHIUM CARBONATE 300 MG: 300 TABLET, EXTENDED RELEASE ORAL at 08:13

## 2017-06-06 RX ADMIN — NICOTINE POLACRILEX 4 MG: 2 GUM, CHEWING ORAL at 13:39

## 2017-06-06 RX ADMIN — OLANZAPINE 10 MG: 10 TABLET, ORALLY DISINTEGRATING ORAL at 16:48

## 2017-06-06 RX ADMIN — LITHIUM CARBONATE 600 MG: 300 TABLET, EXTENDED RELEASE ORAL at 20:14

## 2017-06-06 RX ADMIN — LORAZEPAM 0.5 MG: 0.5 TABLET ORAL at 10:56

## 2017-06-06 RX ADMIN — PRAZOSIN HYDROCHLORIDE 2 MG: 2 CAPSULE ORAL at 20:14

## 2017-06-06 RX ADMIN — IBUPROFEN 400 MG: 200 TABLET, FILM COATED ORAL at 08:13

## 2017-06-06 RX ADMIN — NICOTINE 1 PATCH: 7 PATCH TRANSDERMAL at 08:13

## 2017-06-06 RX ADMIN — OLANZAPINE 10 MG: 10 TABLET, ORALLY DISINTEGRATING ORAL at 14:39

## 2017-06-06 ASSESSMENT — ACTIVITIES OF DAILY LIVING (ADL)
HYGIENE/GROOMING: INDEPENDENT
DRESS: STREET CLOTHES;INDEPENDENT
ORAL_HYGIENE: INDEPENDENT
LAUNDRY: WITH SUPERVISION
DRESS: INDEPENDENT
GROOMING: INDEPENDENT
ORAL_HYGIENE: INDEPENDENT
LAUNDRY: WITH SUPERVISION

## 2017-06-06 NOTE — PROGRESS NOTES
"Pt was calm and cooperative. Pt stated that she was tired due to poor sleep. Pt denies depression, anxiety, SI, SIB, AH, and VH. Pt stated that she feels \"good\". Pt appeared to be okay with going to IRTS when she is discharged. Pt refused he trazodone because it make her groggy. Instead pt took Zyprexa to help her sleep. No physical health concerns at this time.  "

## 2017-06-06 NOTE — PROGRESS NOTES
06/06/17 1300   Behavioral Health   Hallucinations denies / not responding to hallucinations   Thinking intact   Orientation person: oriented;date: oriented;place: oriented;time: oriented   Memory baseline memory   Insight insight appropriate to situation;insight appropriate to events   Judgement impaired   Eye Contact at examiner   Affect/Mood (WDL) WDL   ADL Assessment (WDL) WDL   Suicidality (WDL) WDL   Self Injury (WDL) WDL   Activity other (see comment)  (Sleeping quite a bit, somewhat social after awakening)   Speech (WDL) WDL   Medication Sensitivity (WDL) WDL   Psychomotor Gait (WDL) WDL   Overt Agression (WDL) WDL   Psycho Education   Type of Intervention 1:1 intervention   Response participates, initiates socially appropriate   Hours 0.5   Treatment Detail Wellness Strategies   Activities of Daily Living   Hygiene/Grooming independent   Oral Hygiene independent   Dress independent   Laundry with supervision   Room Organization independent   patient denied any issues, but does seem to be growing increasingly impatient at wait for housing. Reports plan as waiting for a bed date, then going home until that date. In the mean time, patient feels cooped up and bored.

## 2017-06-06 NOTE — PROGRESS NOTES
"[facility], [place]   Psychiatric Progress Note         Interim History and Subjective Reports:   The patient's care was discussed with the treatment team during the daily team meeting.  Staff reports:  No acute issues    No complaints today.  Patient denies SI/HI.  Patient denies psychosis/AH/VH./paranoia.    She focused on discharge plans and when she can leave.   No side effects reported.         Scheduled Medications:       trifluoperazine  4 mg Oral BID     trifluoperazine  2 mg Oral Daily     nicotine   Transdermal Q8H     nicotine   Transdermal Daily     nicotine  1 patch Transdermal Daily     lithium  300 mg Oral QAM     lithium  600 mg Oral QPM     traZODone  200 mg Oral At Bedtime     prazosin  2 mg Oral At Bedtime          Allergies:      Allergies   Allergen Reactions     Adhesive Tape Hives     Prednisone Other (See Comments) and Hives     Suicidal ideation     Droperidol Anxiety          Labs:     No results found for this or any previous visit (from the past 24 hour(s)).       Vitals:   /71  Pulse 92  Temp 95.8  F (35.4  C) (Oral)  Resp 16  Ht 1.676 m (5' 6\")  Wt 90.7 kg (200 lb)  SpO2 97%  BMI 32.28 kg/m2  Weight: 200 lbs 0 oz          Height: 5' 6\"           Body mass index is 32.28 kg/(m^2).        Mental Status Examination:   Appearance: awake, alert and adequately groomed  Attitude:  cooperative  Eye Contact:  poor   Mood:  better  Affect:  appropriate and in normal range  Speech:  clear, coherent  Psychomotor Behavior:  no evidence of tardive dyskinesia, dystonia, or tics.   Throught Process:  linear  Associations:  no loose associations  Thought Content:  no evidence of suicidal ideation or homicidal ideation, auditory hallucinations present and Paranoia wasMild   Insight:  partial  Judgement:  limited  Oriented to:  time, person, and place  Attention Span and Concentration:  limited  Recent and Remote Memory:  intact         DIagnoses:     1. " Schizoaffective disorder - bipolar type  2. PTSD  3. Suspect borderline personality disorder.   4. History of Marijuana use disorder.   5. History of opiate use disorder         Plan:   1. Biological treatments:  -- Cont current medications  -- check lithium level tomorrow    2. Psychosocial treatments:   --addressed with SW consult and groups  --IRTS referral; awaiting decision from [facility]; funding has been a barrier    3. Dispo:  -- She is under full commitment status. Awaiting further stabilization and acceptance at an ITS facility

## 2017-06-07 LAB
CREAT SERPL-MCNC: 0.67 MG/DL (ref 0.52–1.04)
GFR SERPL CREATININE-BSD FRML MDRD: NORMAL ML/MIN/1.7M2
LITHIUM SERPL-SCNC: 0.8 MMOL/L (ref 0.6–1.2)

## 2017-06-07 PROCEDURE — 12400001 ZZH R&B MH UMMC

## 2017-06-07 PROCEDURE — 99232 SBSQ HOSP IP/OBS MODERATE 35: CPT | Performed by: PSYCHIATRY & NEUROLOGY

## 2017-06-07 PROCEDURE — 25000132 ZZH RX MED GY IP 250 OP 250 PS 637: Performed by: PSYCHIATRY & NEUROLOGY

## 2017-06-07 PROCEDURE — 25000125 ZZHC RX 250: Performed by: PSYCHIATRY & NEUROLOGY

## 2017-06-07 PROCEDURE — 36415 COLL VENOUS BLD VENIPUNCTURE: CPT | Performed by: PSYCHIATRY & NEUROLOGY

## 2017-06-07 PROCEDURE — 80178 ASSAY OF LITHIUM: CPT | Performed by: PSYCHIATRY & NEUROLOGY

## 2017-06-07 PROCEDURE — 82565 ASSAY OF CREATININE: CPT | Performed by: PSYCHIATRY & NEUROLOGY

## 2017-06-07 RX ORDER — NICOTINE 21 MG/24HR
1 PATCH, TRANSDERMAL 24 HOURS TRANSDERMAL DAILY
Status: DISCONTINUED | OUTPATIENT
Start: 2017-06-07 | End: 2017-06-19

## 2017-06-07 RX ORDER — ZALEPLON 5 MG/1
20 CAPSULE ORAL AT BEDTIME
Status: DISCONTINUED | OUTPATIENT
Start: 2017-06-07 | End: 2017-06-08

## 2017-06-07 RX ADMIN — BENZTROPINE MESYLATE 1 MG: 1 TABLET ORAL at 16:02

## 2017-06-07 RX ADMIN — PRAZOSIN HYDROCHLORIDE 2 MG: 2 CAPSULE ORAL at 20:18

## 2017-06-07 RX ADMIN — OLANZAPINE 10 MG: 10 TABLET, ORALLY DISINTEGRATING ORAL at 01:12

## 2017-06-07 RX ADMIN — HYDROXYZINE HYDROCHLORIDE 50 MG: 25 TABLET ORAL at 19:30

## 2017-06-07 RX ADMIN — NICOTINE 1 PATCH: 7 PATCH TRANSDERMAL at 11:07

## 2017-06-07 RX ADMIN — TRIFLUOPERAZINE HYDROCHLORIDE 4 MG: 2 TABLET, FILM COATED ORAL at 11:06

## 2017-06-07 RX ADMIN — TRIFLUOPERAZINE HYDROCHLORIDE 2 MG: 2 TABLET, FILM COATED ORAL at 15:06

## 2017-06-07 RX ADMIN — OLANZAPINE 10 MG: 10 TABLET, ORALLY DISINTEGRATING ORAL at 22:07

## 2017-06-07 RX ADMIN — BENZTROPINE MESYLATE 1 MG: 1 TABLET ORAL at 23:20

## 2017-06-07 RX ADMIN — LORAZEPAM 0.5 MG: 0.5 TABLET ORAL at 01:12

## 2017-06-07 RX ADMIN — ZALEPLON 20 MG: 5 CAPSULE ORAL at 20:18

## 2017-06-07 RX ADMIN — TRIFLUOPERAZINE HYDROCHLORIDE 4 MG: 2 TABLET, FILM COATED ORAL at 19:30

## 2017-06-07 RX ADMIN — LITHIUM CARBONATE 600 MG: 300 TABLET, EXTENDED RELEASE ORAL at 19:30

## 2017-06-07 RX ADMIN — IBUPROFEN 400 MG: 200 TABLET, FILM COATED ORAL at 12:01

## 2017-06-07 RX ADMIN — NICOTINE 1 PATCH: 14 PATCH, EXTENDED RELEASE TRANSDERMAL at 14:11

## 2017-06-07 RX ADMIN — LORAZEPAM 0.5 MG: 0.5 TABLET ORAL at 18:24

## 2017-06-07 RX ADMIN — TRAZODONE HYDROCHLORIDE 100 MG: 100 TABLET ORAL at 23:20

## 2017-06-07 RX ADMIN — ONDANSETRON 4 MG: 4 TABLET, ORALLY DISINTEGRATING ORAL at 23:23

## 2017-06-07 RX ADMIN — LITHIUM CARBONATE 300 MG: 300 TABLET, EXTENDED RELEASE ORAL at 11:04

## 2017-06-07 ASSESSMENT — ACTIVITIES OF DAILY LIVING (ADL)
DRESS: INDEPENDENT
ORAL_HYGIENE: INDEPENDENT
ORAL_HYGIENE: INDEPENDENT
GROOMING: INDEPENDENT
LAUNDRY: WITH SUPERVISION
GROOMING: HANDWASHING;SHOWER;INDEPENDENT
DRESS: STREET CLOTHES;INDEPENDENT
LAUNDRY: WITH SUPERVISION

## 2017-06-07 NOTE — PROGRESS NOTES
"[facility], [place]   Psychiatric Progress Note         Interim History and Subjective Reports:   The patient's care was discussed with the treatment team during the daily team meeting.  Staff reports:  No acute issues    No complaints today.  Patient denies SI/HI.  Patient denies psychosis/AH/VH./paranoia.    We again reviewed discharge plans and progress with referrals.   Sleep was poor last night despite taking several zyprexa. She felt irritable and anxious prompting her insomnia. Mostly secondary to continued hospitalization. She was open to reviewing sleep ad options.   Nicotine cravings and frequently requests the gum.          Scheduled Medications:       trifluoperazine  4 mg Oral BID     trifluoperazine  2 mg Oral Daily     nicotine   Transdermal Q8H     nicotine   Transdermal Daily     nicotine  1 patch Transdermal Daily     lithium  300 mg Oral QAM     lithium  600 mg Oral QPM     traZODone  200 mg Oral At Bedtime     prazosin  2 mg Oral At Bedtime          Allergies:      Allergies   Allergen Reactions     Adhesive Tape Hives     Prednisone Other (See Comments) and Hives     Suicidal ideation     Droperidol Anxiety          Labs:     Recent Results (from the past 24 hour(s))   Lithium level    Collection Time: 06/07/17  7:40 AM   Result Value Ref Range    Lithium Level 0.8 0.6 - 1.2 mmol/L   Creatinine    Collection Time: 06/07/17  7:40 AM   Result Value Ref Range    Creatinine 0.67 0.52 - 1.04 mg/dL    GFR Estimate >90  Non  GFR Calc   >60 mL/min/1.7m2    GFR Estimate If Black >90   GFR Calc   >60 mL/min/1.7m2          Vitals:   BP (!) 139/93  Pulse 110  Temp 97.6  F (36.4  C) (Oral)  Resp 16  Ht 1.676 m (5' 6\")  Wt 90.7 kg (200 lb)  SpO2 97%  BMI 32.28 kg/m2  Weight: 200 lbs 0 oz          Height: 5' 6\"           Body mass index is 32.28 kg/(m^2).        Mental Status Examination:   Appearance: awake, alert and adequately " groomed  Attitude:  cooperative  Eye Contact:  poor   Mood:  better  Affect:  appropriate and in normal range  Speech:  clear, coherent  Psychomotor Behavior:  no evidence of tardive dyskinesia, dystonia, or tics.   Throught Process:  linear  Associations:  no loose associations  Thought Content:  no evidence of suicidal ideation or homicidal ideation and no evidence of psychotic thought   Insight:  partial  Judgement:  limited  Oriented to:  time, person, and place  Attention Span and Concentration:  limited  Recent and Remote Memory:  intact         DIagnoses:     1. Schizoaffective disorder - bipolar type  2. PTSD  3. Suspect borderline personality disorder.   4. History of Marijuana use disorder.   5. History of opiate use disorder         Plan:   1. Biological treatments:  -- Lithium level reviewed; 0.8; continue current dose  -- Cont Stelazine TID for reduction of psychosis  -- add sonata for insomnia; risks reviewed.   -- increase nicotine patch to address cravings    2. Psychosocial treatments:   --addressed with SW consult and groups  --IRTS referral; awaiting acceptance     3. Dispo:  -- She is under full commitment status. Awaiting acceptance from an IRTS facility

## 2017-06-07 NOTE — PLAN OF CARE
Problem: General Plan of Care (Inpatient Behavioral)  Goal: Individualization/Patient Specific Goal (IP Behavioral)  The patient and/or their representative will achieve their patient-specific goals related to the plan of care.    The patient-specific goals include:  Illness Management Recovery model: Objectives    Patient will identify reason(s) for hospitalization from their perspective.  Patient will identify a minimum of three goals for discharge.  Patient will identify a minimum of three triggers that may increase their symptoms.  Patient will identify a minimum of three coping skills they can do to stay well.   Patient will identify their support system to demonstrate readiness for discharge.   Outcome: Therapy, unable to show any progress toward functional goals  Illness Management Recovery model:  Warning Signs.     Patient identified the following early warning signs which may indicate that a relapse of their illness is startin. Taking long walks  2. Auditory hallucinations  3. Poor sleep

## 2017-06-07 NOTE — PROGRESS NOTES
"Pt is waiting to go to [facility].  \"I'm just waiting for a bed to open + I'm out of here.\"  Pt asked staff, \"Do you think they want me to stay here for ever.\"  Pt denies anx, dep, SI, SIB.  Pt had a friend visit.  Pt was very glad to have her visit.  Pt had no goal for the day.  "

## 2017-06-07 NOTE — PLAN OF CARE
Problem: Manic Symptoms  Intervention: Social and Therapeutic Interv (Manic Symptoms)     Occupational Therapy:  Pt did not attend any OT groups today.

## 2017-06-07 NOTE — PROGRESS NOTES
I left  for Jacob at [facility] -[phone].  I called [employer] - [phone] to see about auth status for the [facility] and was on hold for 20 minutes and hung up.  Pt asked if she should call and I said that would work and gave her the number.  I am faxing this list to the court  who is assigning this case to the long term .      IRTS Work Sheet   IRTS Facility Phone: Fax: Tru   [facility]  [address] [place] [address]         Loree Lewis [phone]     email address is ;      [email or web address]       Fax:      [phone]                                                                                                                     [facility]  [address] [place] [address]    Ph: [phone] Fax:[phone]        [facility]  [facility]  [address] [facility]. [phone]    Fax:[phone]     [facility]  Katty Hector  [address] [place] [address]          Sherley  [phone] Fax:[phone]     [facility]  [place]  [address] [place] [address]          Jaime [phone] Fax:[phone]     [facility]  Reentry Oradell  [address] [place], [place]       Jesenia([phone]) [phone] Fax:[phone]     [facility]        Facility 1.  [place] [id number] [address]        Facility 2:  [facility]  [address] [place] [address]  Phone: [phone]  Fax: [phone]     Hiram ESPINO - [phone]     Admissions      Need to send referral form in    Fax: [phone]    5/30- talked live and sent records, told to check back in a couple of days  6/5 - CTC called admissions for an update and left a vm  6/7- talked to Kel in admissions about confirming that pt is on the list      [facility]  [address]  [place] [address]       April [phone] Fax: [phone]        [facility]     [address]  [address]  [place]  [phone] Fax: [phone]     [facility]     [address] [phone]     Need approval from [facility]  Urgent Care Team :  [phone]        Urgent Care:  [phone] This is a crisis home.   [facility]  [address]  [place] [address]    Shannon PETER [phone] Fax: [phone] 5/30 - left vm and faxed records  5/31- received return vm saying they have the records and will be in touch  6/2 - #43 on the list, update this every week      [facility]  [address] [place] [address]    Katiuska Thompson    [phone] Fax: [phone] 5/30 - left vm and faxed records  5/31- received vm saying to contact them  6/2 -left vm for Ramone asking for status update   [facility]  [address] [place] [address]       Black ([phone]) [phone] Fax: [phone] 5/30 - left vm and faxed records  5/31 - talked live to Will who said that they wanted pt to be more stable  6/1 - Informed pt and she said she really wants to go there. I said I would send updated progress notes.  6/2- I reviewed latest notes to possibly send and the last RN note stills notes a high level of agitation so I did not send   [facility]  [place]    Pat [phone] Fax: [phone] 5/30 - talked live to [place] and faxed records  6/1 - vm from Pat saying there are no openings and there will not be openings for a long time   [facility] - Crisis Bed   [place]  [address]  [place] [address] [phone] Fax: [phone] This is a crisis home.   [facility]   [address] [phone] 5/30 -talked to live person and faxed records  5/31 - received vm saying to contact them  6/2 -called home and was told to call Jaimie on  Monday 6/5 - CTC called Jaimie,opening next week, records are being reviewed   [facility] -6 Crisis Beds  [place] [address] [place] [address]  [employer] [facility].  [phone] or [phone]. [phone]             This is a crisis home.   [facility] - [facility] Beds  [place] [address] [place] [address]     Yaquelin [phone] 5/30 - left vm, faxed records,   6/2 - left vm for Yaquelin asking for update  6/5- received vm from Yaquelin asking for 4 pieces of info, I left Yaquelin a vm with the 4 things she was asking for   [facility]  [address] [facility] [address] [phone] Fax: [phone] 5/30 - left vm for , faxed records  6/2 - left vm for admission director

## 2017-06-08 PROCEDURE — 25000132 ZZH RX MED GY IP 250 OP 250 PS 637: Performed by: PSYCHIATRY & NEUROLOGY

## 2017-06-08 PROCEDURE — 99232 SBSQ HOSP IP/OBS MODERATE 35: CPT | Performed by: PSYCHIATRY & NEUROLOGY

## 2017-06-08 PROCEDURE — 12400001 ZZH R&B MH UMMC

## 2017-06-08 RX ORDER — TRIFLUOPERAZINE HYDROCHLORIDE 2 MG/1
4 TABLET, FILM COATED ORAL 2 TIMES DAILY
Qty: 120 TABLET | Refills: 0 | Status: SHIPPED | OUTPATIENT
Start: 2017-06-08 | End: 2017-06-20

## 2017-06-08 RX ORDER — LORAZEPAM 0.5 MG/1
0.5 TABLET ORAL 2 TIMES DAILY PRN
Qty: 30 TABLET | Refills: 1 | Status: SHIPPED | OUTPATIENT
Start: 2017-06-08 | End: 2017-06-26

## 2017-06-08 RX ORDER — LANOLIN ALCOHOL/MO/W.PET/CERES
9 CREAM (GRAM) TOPICAL AT BEDTIME
Status: DISCONTINUED | OUTPATIENT
Start: 2017-06-08 | End: 2017-06-27 | Stop reason: HOSPADM

## 2017-06-08 RX ORDER — BENZTROPINE MESYLATE 1 MG/1
1 TABLET ORAL 2 TIMES DAILY PRN
Qty: 30 TABLET | Refills: 0 | Status: SHIPPED | OUTPATIENT
Start: 2017-06-08 | End: 2017-06-26

## 2017-06-08 RX ORDER — LANOLIN ALCOHOL/MO/W.PET/CERES
9 CREAM (GRAM) TOPICAL
Status: DISCONTINUED | OUTPATIENT
Start: 2017-06-08 | End: 2017-06-08

## 2017-06-08 RX ORDER — LITHIUM CARBONATE 300 MG/1
600 TABLET, FILM COATED, EXTENDED RELEASE ORAL EVERY EVENING
Qty: 60 TABLET | Refills: 0 | Status: SHIPPED | OUTPATIENT
Start: 2017-06-08 | End: 2017-06-26

## 2017-06-08 RX ORDER — LITHIUM CARBONATE 300 MG/1
300 TABLET, FILM COATED, EXTENDED RELEASE ORAL EVERY MORNING
Qty: 30 TABLET | Refills: 0 | Status: SHIPPED | OUTPATIENT
Start: 2017-06-08 | End: 2017-06-26

## 2017-06-08 RX ORDER — ESZOPICLONE 3 MG/1
3 TABLET, FILM COATED ORAL AT BEDTIME
Qty: 30 TABLET | Refills: 0 | Status: SHIPPED | OUTPATIENT
Start: 2017-06-08 | End: 2017-06-26

## 2017-06-08 RX ORDER — TRIFLUOPERAZINE HYDROCHLORIDE 2 MG/1
2 TABLET, FILM COATED ORAL EVERY MORNING
Qty: 30 TABLET | Refills: 0 | Status: SHIPPED | OUTPATIENT
Start: 2017-06-08 | End: 2017-06-20

## 2017-06-08 RX ORDER — PRAZOSIN HYDROCHLORIDE 2 MG/1
2 CAPSULE ORAL AT BEDTIME
Qty: 30 CAPSULE | Refills: 0 | Status: SHIPPED | OUTPATIENT
Start: 2017-06-08 | End: 2017-06-26

## 2017-06-08 RX ORDER — OLANZAPINE 10 MG/1
10 TABLET ORAL DAILY PRN
Qty: 30 TABLET | Refills: 0 | Status: SHIPPED | OUTPATIENT
Start: 2017-06-08 | End: 2017-06-26

## 2017-06-08 RX ORDER — ESZOPICLONE 3 MG/1
3 TABLET, FILM COATED ORAL AT BEDTIME
Status: DISCONTINUED | OUTPATIENT
Start: 2017-06-08 | End: 2017-06-27 | Stop reason: HOSPADM

## 2017-06-08 RX ADMIN — PRAZOSIN HYDROCHLORIDE 2 MG: 2 CAPSULE ORAL at 20:13

## 2017-06-08 RX ADMIN — OLANZAPINE 10 MG: 10 TABLET, ORALLY DISINTEGRATING ORAL at 11:18

## 2017-06-08 RX ADMIN — NICOTINE POLACRILEX 2 MG: 2 GUM, CHEWING ORAL at 14:25

## 2017-06-08 RX ADMIN — Medication 9 MG: at 20:13

## 2017-06-08 RX ADMIN — LITHIUM CARBONATE 300 MG: 300 TABLET, EXTENDED RELEASE ORAL at 10:39

## 2017-06-08 RX ADMIN — LORAZEPAM 0.5 MG: 0.5 TABLET ORAL at 15:01

## 2017-06-08 RX ADMIN — TRIFLUOPERAZINE HYDROCHLORIDE 4 MG: 2 TABLET, FILM COATED ORAL at 10:39

## 2017-06-08 RX ADMIN — ESZOPICLONE 3 MG: 3 TABLET, FILM COATED ORAL at 20:13

## 2017-06-08 RX ADMIN — IBUPROFEN 400 MG: 200 TABLET, FILM COATED ORAL at 18:05

## 2017-06-08 RX ADMIN — NICOTINE 1 PATCH: 14 PATCH, EXTENDED RELEASE TRANSDERMAL at 10:39

## 2017-06-08 RX ADMIN — LITHIUM CARBONATE 600 MG: 300 TABLET, EXTENDED RELEASE ORAL at 20:13

## 2017-06-08 RX ADMIN — TRIFLUOPERAZINE HYDROCHLORIDE 4 MG: 2 TABLET, FILM COATED ORAL at 20:14

## 2017-06-08 RX ADMIN — TRIFLUOPERAZINE HYDROCHLORIDE 2 MG: 2 TABLET, FILM COATED ORAL at 14:25

## 2017-06-08 NOTE — PROGRESS NOTES
Spoke with Kel from [facility]. Provided him with updated pt information. He is requesting we check-in with him next week to discuss referral if pt is still hospitalized.

## 2017-06-08 NOTE — PROGRESS NOTES
Received call from Mary at [facility] stating that they had immediate openings. Informed Mary that pt is in need of IRTS. I faxed over clinicals and commitment paperwork to [facility] in [place]. Their fax is down so she provided me the fax number of an alternate company that she is able to access, [phone]. This is not the long term fax number however today this is where Mary requested the paperwork be sent.     Spoke with [facility] Valdo [phone] who stated she would come to the hospital on Thursday June 15th in the afternoon to meet with pt. Valdo stated that she does not support pt going to [facility] as she believes that they currently do not have strong programming and this would be setting her up for failure.     Jacob from [facility] stated that [facility] will not fund their program. [facility] will not be able to akhtar pt's insurance to facilitate the referral to this IRTS. Placement continues to be ongoing.     Waiting for call back from Mary at [facility] to see if pt will be a good fit for their program and to schedule interview/intake. Follow up with Valdo if pt is admitted to IRTS prior to meeting scheduled on June 15th.

## 2017-06-08 NOTE — PLAN OF CARE
Problem: Manic Symptoms  Goal: Manic Symptoms  Signs and symptoms of listed problems will be absent or manageable.   Patient will report less lability of mood  Patient will maintain appropriate boundaries with peers and staff  Patient speech will be notable for appropriate content, rate, and volume  Patient will report less difficulty falling and staying asleep   Outcome: Therapy, progress toward functional goals is gradual  Pt slept in till 1000 this morning receiving her am medications late. Pt reports increasing aggravation, frustration with continued hospitalization stay. In 1:1 stated she was thinking staff colluding to pacify her by saying reassuring things as we extend her stay. Pt is disheveled appearance today. No groups attended. Pt has been on the phone with insurance company.

## 2017-06-08 NOTE — PLAN OF CARE
Problem: Manic Symptoms  Intervention: Social and Therapeutic Interv (Manic Symptoms)     Occupational Therapy:  Pt did not attend OT groups today.

## 2017-06-08 NOTE — PROGRESS NOTES
Mary from [facility] received pt's referral and will be coming to the unit today to interview pt. If she is approved to their facility they will be able to admit her on Friday 6/9 or Monday 6/12. Updated Dr. Santana about this and he feels pt is ready to d/c.   CM please follow-up and when more information is known update pt's [facility] Valdo, [phone].

## 2017-06-08 NOTE — PROGRESS NOTES
"[facility], [place]   Psychiatric Progress Note         Interim History and Subjective Reports:   The patient's care was discussed with the treatment team during the daily team meeting.  Staff reports:  No acute issues    No complaints today.  Patient denies SI/HI.  Patient denies psychosis/AH/VH./paranoia.    We again reviewed discharge plans and progress with referrals.   Sleep was poor last night despite taking several zyprexa. She felt irritable and anxious prompting her insomnia. Mostly secondary to continued hospitalization. She was open to reviewing sleep ad options.   Nicotine cravings and frequently requests the gum.          Scheduled Medications:       eszopiclone  3 mg Oral At Bedtime     melatonin  9 mg Oral At Bedtime     nicotine   Transdermal Q8H     nicotine   Transdermal Daily     nicotine  1 patch Transdermal Daily     trifluoperazine  4 mg Oral BID     trifluoperazine  2 mg Oral Daily     lithium  300 mg Oral QAM     lithium  600 mg Oral QPM     prazosin  2 mg Oral At Bedtime          Allergies:      Allergies   Allergen Reactions     Adhesive Tape Hives     Prednisone Other (See Comments) and Hives     Suicidal ideation     Droperidol Anxiety          Labs:     No results found for this or any previous visit (from the past 24 hour(s)).       Vitals:   BP (!) 139/93  Pulse 110  Temp 97.6  F (36.4  C) (Oral)  Resp 16  Ht 1.676 m (5' 6\")  Wt 90.7 kg (200 lb)  SpO2 97%  BMI 32.28 kg/m2  Weight: 200 lbs 0 oz          Height: 5' 6\"           Body mass index is 32.28 kg/(m^2).        Mental Status Examination:   Appearance: awake, alert and adequately groomed  Attitude:  cooperative  Eye Contact:  poor   Mood:  better  Affect:  appropriate and in normal range  Speech:  clear, coherent  Psychomotor Behavior:  no evidence of tardive dyskinesia, dystonia, or tics.   Throught Process:  linear  Associations:  no loose associations  Thought Content:  no evidence of " suicidal ideation or homicidal ideation and no evidence of psychotic thought   Insight:  partial  Judgement:  limited  Oriented to:  time, person, and place  Attention Span and Concentration:  limited  Recent and Remote Memory:  intact         DIagnoses:     1. Schizoaffective disorder - bipolar type  2. PTSD  3. Suspect borderline personality disorder.   4. History of Marijuana use disorder.   5. History of opiate use disorder         Plan:   1. Biological treatments:  -- Lithium level reviewed; 0.8; continue current dose  -- Cont Stelazine TID for reduction of psychosis  -- add lunesta for insomnia; d/c sonata which was not effective; add melatonin to augment      2. Psychosocial treatments:   --addressed with SW consult and groups  --IRTS referral; awaiting acceptance     3. Dispo:  -- She is under full commitment status. Awaiting acceptance from an IRTS facility  -- interview today for IRTS with possible admission tomorrow

## 2017-06-08 NOTE — PROGRESS NOTES
[facility] Jacob is worried that pt's [employer] will not fund their facility. Stated he will call [facility] to find out who the pt's CCM is to see if they can switch insurance providers to different PMAP or [facility]. Reports he will call back with update.

## 2017-06-09 ENCOUNTER — APPOINTMENT (OUTPATIENT)
Dept: GENERAL RADIOLOGY | Facility: CLINIC | Age: 27
DRG: 885 | End: 2017-06-09
Attending: NURSE PRACTITIONER
Payer: COMMERCIAL

## 2017-06-09 PROCEDURE — 25000132 ZZH RX MED GY IP 250 OP 250 PS 637: Performed by: PSYCHIATRY & NEUROLOGY

## 2017-06-09 PROCEDURE — 12400001 ZZH R&B MH UMMC

## 2017-06-09 PROCEDURE — 73130 X-RAY EXAM OF HAND: CPT | Mod: RT

## 2017-06-09 RX ADMIN — TRIFLUOPERAZINE HYDROCHLORIDE 2 MG: 2 TABLET, FILM COATED ORAL at 14:36

## 2017-06-09 RX ADMIN — LORAZEPAM 0.5 MG: 0.5 TABLET ORAL at 20:18

## 2017-06-09 RX ADMIN — OLANZAPINE 10 MG: 10 TABLET, ORALLY DISINTEGRATING ORAL at 10:48

## 2017-06-09 RX ADMIN — IBUPROFEN 400 MG: 200 TABLET, FILM COATED ORAL at 12:33

## 2017-06-09 RX ADMIN — HYDROXYZINE HYDROCHLORIDE 50 MG: 25 TABLET ORAL at 16:53

## 2017-06-09 RX ADMIN — TRIFLUOPERAZINE HYDROCHLORIDE 4 MG: 2 TABLET, FILM COATED ORAL at 20:18

## 2017-06-09 RX ADMIN — LITHIUM CARBONATE 600 MG: 300 TABLET, EXTENDED RELEASE ORAL at 20:18

## 2017-06-09 RX ADMIN — OLANZAPINE 10 MG: 10 TABLET, ORALLY DISINTEGRATING ORAL at 18:34

## 2017-06-09 RX ADMIN — NICOTINE 1 PATCH: 14 PATCH, EXTENDED RELEASE TRANSDERMAL at 09:26

## 2017-06-09 RX ADMIN — Medication 9 MG: at 21:12

## 2017-06-09 RX ADMIN — PRAZOSIN HYDROCHLORIDE 2 MG: 2 CAPSULE ORAL at 20:18

## 2017-06-09 RX ADMIN — ESZOPICLONE 3 MG: 3 TABLET, FILM COATED ORAL at 20:22

## 2017-06-09 RX ADMIN — TRIFLUOPERAZINE HYDROCHLORIDE 4 MG: 2 TABLET, FILM COATED ORAL at 09:26

## 2017-06-09 RX ADMIN — LITHIUM CARBONATE 300 MG: 300 TABLET, EXTENDED RELEASE ORAL at 09:26

## 2017-06-09 RX ADMIN — LORAZEPAM 0.5 MG: 0.5 TABLET ORAL at 11:04

## 2017-06-09 RX ADMIN — OLANZAPINE 10 MG: 10 TABLET, ORALLY DISINTEGRATING ORAL at 22:36

## 2017-06-09 ASSESSMENT — ACTIVITIES OF DAILY LIVING (ADL)
ORAL_HYGIENE: INDEPENDENT
DRESS: STREET CLOTHES
HYGIENE/GROOMING: INDEPENDENT

## 2017-06-09 NOTE — PLAN OF CARE
"Problem: Manic Symptoms  Goal: Manic Symptoms  Signs and symptoms of listed problems will be absent or manageable.   Patient will report less lability of mood  Patient will maintain appropriate boundaries with peers and staff  Patient speech will be notable for appropriate content, rate, and volume  Patient will report less difficulty falling and staying asleep   Outcome: Therapy, progress toward functional goals is gradual  [facility] Pt very distressed frustrated, angry with news. Pt approached writer at approximately 1030 to report she had hit wall in anger. Right hand swelling over ring finger. Pt refused ice initially. \" I can't feel it\" pt had 1:1 with writer , maggie walter, and wesley oreilly pt received olanzapine prn and ibuprofen for pain. Internal medicine advised and seen for assessment of hand. Portable xray ordered.         "

## 2017-06-09 NOTE — PLAN OF CARE
Problem: Manic Symptoms  Intervention: Social and Therapeutic Interv (Manic Symptoms)        Occupational Therapy:  Pt came to OT clinic with ice pack on right hand.  She chose a large poster to color, began coloring, but quit after less than five minutes, saying she could not do it. Pt then left group.

## 2017-06-09 NOTE — PROGRESS NOTES
Vernr was informed via telephone this morning by Mary at [facility] that pt is appropriate for their facility however they will not be able to admit her at this time due to another individual getting placement before her. Mary stated they will keep her on the wait list which is approximately 3 weeks.   Writer called IRTS facilities: [facility]; [facility], [facility], [facility], and [facility] to follow-up with referrals. Pt is now number 33 on the wait list at [facility]. [facility] does not have a bed until July 20th. All the other facilities I left a voicemail requesting a return call for an update.  CM to follow-up next week with Kel at [facility] per yesterdays conversation with him. Writer updated pt's CCM Valdo via voicemail message.     Pt was very upset about not getting into [facility] and again took out her anger by hitting a wall with her fist. Writer and pt had a discussion with RN about working on different coping skills, acknowledged her frustration with the process, and provided brief psycho-education on the importance of following through with mental health recommendations so as not to wind up in the hospital again. Pt acknowledged her understanding of this and appered to contemplate the message. Pt continues to remain hopeful for d/c soon.

## 2017-06-09 NOTE — PROGRESS NOTES
06/08/17 2200   Behavioral Health   Hallucinations denies / not responding to hallucinations   Thinking intact   Orientation place: oriented   Memory baseline memory   Insight insight appropriate to events   Judgement intact   Eye Contact at examiner   Affect other (see comments)   Mood anxious;depressed   Physical Appearance/Attire attire appropriate to age and situation   Hygiene well groomed   Suicidality other (see comments)  (denies)   Self Injury other (see comment)  (denies)   Activity isolative   Speech clear;coherent   Pt reports feeling anxious at times because of the unit being loud and decreased depression.Pt also report not sleeping well.

## 2017-06-10 PROCEDURE — 12400001 ZZH R&B MH UMMC

## 2017-06-10 PROCEDURE — 25000132 ZZH RX MED GY IP 250 OP 250 PS 637: Performed by: PSYCHIATRY & NEUROLOGY

## 2017-06-10 RX ADMIN — TRIFLUOPERAZINE HYDROCHLORIDE 2 MG: 2 TABLET, FILM COATED ORAL at 13:31

## 2017-06-10 RX ADMIN — LORAZEPAM 0.5 MG: 0.5 TABLET ORAL at 01:35

## 2017-06-10 RX ADMIN — PRAZOSIN HYDROCHLORIDE 2 MG: 2 CAPSULE ORAL at 20:05

## 2017-06-10 RX ADMIN — LITHIUM CARBONATE 600 MG: 300 TABLET, EXTENDED RELEASE ORAL at 20:03

## 2017-06-10 RX ADMIN — BENZTROPINE MESYLATE 1 MG: 1 TABLET ORAL at 01:35

## 2017-06-10 RX ADMIN — ESZOPICLONE 3 MG: 3 TABLET, FILM COATED ORAL at 20:02

## 2017-06-10 RX ADMIN — TRIFLUOPERAZINE HYDROCHLORIDE 4 MG: 2 TABLET, FILM COATED ORAL at 09:36

## 2017-06-10 RX ADMIN — NICOTINE 1 PATCH: 14 PATCH, EXTENDED RELEASE TRANSDERMAL at 09:37

## 2017-06-10 RX ADMIN — TRIFLUOPERAZINE HYDROCHLORIDE 4 MG: 2 TABLET, FILM COATED ORAL at 20:03

## 2017-06-10 RX ADMIN — LITHIUM CARBONATE 300 MG: 300 TABLET, EXTENDED RELEASE ORAL at 09:37

## 2017-06-10 RX ADMIN — NICOTINE POLACRILEX 4 MG: 2 GUM, CHEWING ORAL at 12:47

## 2017-06-10 RX ADMIN — NICOTINE POLACRILEX 4 MG: 2 GUM, CHEWING ORAL at 18:13

## 2017-06-10 RX ADMIN — LORAZEPAM 0.5 MG: 0.5 TABLET ORAL at 13:31

## 2017-06-10 RX ADMIN — Medication 9 MG: at 20:03

## 2017-06-10 ASSESSMENT — ACTIVITIES OF DAILY LIVING (ADL)
ORAL_HYGIENE: INDEPENDENT
LAUNDRY: UNABLE TO COMPLETE
GROOMING: INDEPENDENT
DRESS: STREET CLOTHES;INDEPENDENT

## 2017-06-10 NOTE — PLAN OF CARE
Problem: Manic Symptoms  Goal: Manic Symptoms  Signs and symptoms of listed problems will be absent or manageable.   Patient will report less lability of mood  Patient will maintain appropriate boundaries with peers and staff  Patient speech will be notable for appropriate content, rate, and volume  Patient will report less difficulty falling and staying asleep   Outcome: Improving  Slept late. Quiet and waiting for mom and friend to come and visit. Patients mood appeared brighter after their visits.

## 2017-06-11 PROCEDURE — 25000132 ZZH RX MED GY IP 250 OP 250 PS 637: Performed by: PSYCHIATRY & NEUROLOGY

## 2017-06-11 PROCEDURE — 12400001 ZZH R&B MH UMMC

## 2017-06-11 RX ADMIN — NICOTINE POLACRILEX 4 MG: 2 GUM, CHEWING ORAL at 13:18

## 2017-06-11 RX ADMIN — TRIFLUOPERAZINE HYDROCHLORIDE 4 MG: 2 TABLET, FILM COATED ORAL at 10:22

## 2017-06-11 RX ADMIN — LITHIUM CARBONATE 600 MG: 300 TABLET, EXTENDED RELEASE ORAL at 20:07

## 2017-06-11 RX ADMIN — LITHIUM CARBONATE 300 MG: 300 TABLET, EXTENDED RELEASE ORAL at 10:23

## 2017-06-11 RX ADMIN — PRAZOSIN HYDROCHLORIDE 2 MG: 2 CAPSULE ORAL at 20:07

## 2017-06-11 RX ADMIN — TRIFLUOPERAZINE HYDROCHLORIDE 2 MG: 2 TABLET, FILM COATED ORAL at 13:19

## 2017-06-11 RX ADMIN — NICOTINE 1 PATCH: 14 PATCH, EXTENDED RELEASE TRANSDERMAL at 10:21

## 2017-06-11 RX ADMIN — LORAZEPAM 0.5 MG: 0.5 TABLET ORAL at 13:18

## 2017-06-11 RX ADMIN — OLANZAPINE 10 MG: 10 TABLET, ORALLY DISINTEGRATING ORAL at 21:59

## 2017-06-11 RX ADMIN — ESZOPICLONE 3 MG: 3 TABLET, FILM COATED ORAL at 20:06

## 2017-06-11 RX ADMIN — IBUPROFEN 400 MG: 200 TABLET, FILM COATED ORAL at 17:31

## 2017-06-11 RX ADMIN — Medication 9 MG: at 20:07

## 2017-06-11 RX ADMIN — ACETAMINOPHEN 650 MG: 325 TABLET, FILM COATED ORAL at 20:07

## 2017-06-11 RX ADMIN — TRIFLUOPERAZINE HYDROCHLORIDE 4 MG: 2 TABLET, FILM COATED ORAL at 20:07

## 2017-06-11 ASSESSMENT — ACTIVITIES OF DAILY LIVING (ADL)
ORAL_HYGIENE: INDEPENDENT
GROOMING: INDEPENDENT
DRESS: STREET CLOTHES;INDEPENDENT
DRESS: INDEPENDENT
ORAL_HYGIENE: INDEPENDENT
GROOMING: INDEPENDENT
LAUNDRY: UNABLE TO COMPLETE

## 2017-06-11 NOTE — PROGRESS NOTES
"Pt is waiting to go to an ERT[facility].  Pt aicha by talking to friends + family. She keeps in mind a bed will eventually be available .  Pt denies SI/SIB.  She confirms anx 3 of 10 - \"I'm tired of being here.\"  Pt tries to remain positive + have a good attitude.  "

## 2017-06-11 NOTE — PROGRESS NOTES
Brief Medicine Note, 6/10/17:    Internal Medicine contact due to patient punching a wall on 6/9.  Patient is a 26 year old female with a history of polysubstance abuse (in remission), schizoaffective disorder (bipolar type), PTSD, suspected borderline personality disorder, cauda equina syndrome s/p decompression (12/2016), GERD, and nephrolithiasis admitted to inpatient behavioral health on 5/12/17 with acute haven.    Patient seen and examined.  R hand with edema at 3-5th metacarpals with slight discoloration and bruising.  Limited ROM with extension.  Unable to make fist. No numbness, tingling, or paresthesias.  No loss of sensation.  Fingers are warm and well perfused. Pulses intact.  XR x 3 views of R hand shows some soft tissues swelling but no acute fractures.  Continue pain management with ice and acetaminophen PRN.     Medicine will sign off, no further recommendations at this time.  Please feel free to reconsult if patient's symptoms worsen or if new problems arise.       Sydney Subramanian CNP  Hospitalist Service   Pager: [phone]

## 2017-06-11 NOTE — PROGRESS NOTES
"   06/11/17 1234   Behavioral Health   Hallucinations denies / not responding to hallucinations   Thinking intact   Orientation person: oriented;place: oriented   Memory baseline memory   Insight admits / accepts   Judgement impaired   Eye Contact at examiner   Affect blunted, flat;irritable   Mood anxious;mood is calm   Physical Appearance/Attire attire appropriate to age and situation   Hygiene well groomed   Suicidality other (see comments)  (denies)   Self Injury other (see comment)  (denies)   Activity isolative  (spent day mostly in room )   Speech clear;coherent   Medication Sensitivity no stated side effects   Psychomotor / Gait balanced;steady   Psycho Education   Type of Intervention 1:1 intervention   Response participates, initiates socially appropriate   Hours 0.5   Treatment Detail check-in   Activities of Daily Living   Hygiene/Grooming independent   Oral Hygiene independent   Dress independent   Room Organization independent   Pt was isolative today, spent most of the day in room. Pt did not attend groups at all. Pt rated her anxiety a \"5\" on a scale of 1 to 10, pt stated she anxious from being here. Pt denies depression, hallucinations, SI & SIB.   "

## 2017-06-12 PROCEDURE — 90853 GROUP PSYCHOTHERAPY: CPT

## 2017-06-12 PROCEDURE — 25000132 ZZH RX MED GY IP 250 OP 250 PS 637: Performed by: PSYCHIATRY & NEUROLOGY

## 2017-06-12 PROCEDURE — 12400001 ZZH R&B MH UMMC

## 2017-06-12 RX ADMIN — LITHIUM CARBONATE 600 MG: 300 TABLET, EXTENDED RELEASE ORAL at 20:12

## 2017-06-12 RX ADMIN — LORAZEPAM 0.5 MG: 0.5 TABLET ORAL at 18:47

## 2017-06-12 RX ADMIN — TRIFLUOPERAZINE HYDROCHLORIDE 2 MG: 2 TABLET, FILM COATED ORAL at 14:00

## 2017-06-12 RX ADMIN — LORAZEPAM 0.5 MG: 0.5 TABLET ORAL at 00:50

## 2017-06-12 RX ADMIN — ESZOPICLONE 3 MG: 3 TABLET, FILM COATED ORAL at 20:11

## 2017-06-12 RX ADMIN — LITHIUM CARBONATE 300 MG: 300 TABLET, EXTENDED RELEASE ORAL at 10:00

## 2017-06-12 RX ADMIN — NICOTINE POLACRILEX 4 MG: 2 GUM, CHEWING ORAL at 17:57

## 2017-06-12 RX ADMIN — Medication 9 MG: at 20:12

## 2017-06-12 RX ADMIN — PRAZOSIN HYDROCHLORIDE 2 MG: 2 CAPSULE ORAL at 20:11

## 2017-06-12 RX ADMIN — TRIFLUOPERAZINE HYDROCHLORIDE 4 MG: 2 TABLET, FILM COATED ORAL at 10:00

## 2017-06-12 RX ADMIN — TRIFLUOPERAZINE HYDROCHLORIDE 4 MG: 2 TABLET, FILM COATED ORAL at 20:12

## 2017-06-12 ASSESSMENT — ACTIVITIES OF DAILY LIVING (ADL)
DRESS: INDEPENDENT
LAUNDRY: WITH SUPERVISION
ORAL_HYGIENE: INDEPENDENT
GROOMING: INDEPENDENT

## 2017-06-12 NOTE — PROGRESS NOTES
Pt was in the milieu.  Pt denies anx, dep, SI,, SIB. Pt is focussed on d/c but is trying to be patient while waiting for an ERT's bed.

## 2017-06-13 PROCEDURE — 25000132 ZZH RX MED GY IP 250 OP 250 PS 637: Performed by: PSYCHIATRY & NEUROLOGY

## 2017-06-13 PROCEDURE — 99232 SBSQ HOSP IP/OBS MODERATE 35: CPT | Performed by: PSYCHIATRY & NEUROLOGY

## 2017-06-13 PROCEDURE — 12400001 ZZH R&B MH UMMC

## 2017-06-13 RX ORDER — PROPRANOLOL HYDROCHLORIDE 10 MG/1
10 TABLET ORAL 2 TIMES DAILY
Status: DISCONTINUED | OUTPATIENT
Start: 2017-06-13 | End: 2017-06-27 | Stop reason: HOSPADM

## 2017-06-13 RX ADMIN — PRAZOSIN HYDROCHLORIDE 2 MG: 2 CAPSULE ORAL at 20:18

## 2017-06-13 RX ADMIN — LITHIUM CARBONATE 600 MG: 300 TABLET, EXTENDED RELEASE ORAL at 20:18

## 2017-06-13 RX ADMIN — TRIFLUOPERAZINE HYDROCHLORIDE 4 MG: 2 TABLET, FILM COATED ORAL at 18:32

## 2017-06-13 RX ADMIN — PROPRANOLOL HYDROCHLORIDE 10 MG: 10 TABLET ORAL at 14:41

## 2017-06-13 RX ADMIN — ESZOPICLONE 3 MG: 3 TABLET, FILM COATED ORAL at 20:22

## 2017-06-13 RX ADMIN — Medication 9 MG: at 20:18

## 2017-06-13 RX ADMIN — NICOTINE 1 PATCH: 14 PATCH, EXTENDED RELEASE TRANSDERMAL at 08:55

## 2017-06-13 RX ADMIN — PROPRANOLOL HYDROCHLORIDE 10 MG: 10 TABLET ORAL at 20:19

## 2017-06-13 RX ADMIN — TRIFLUOPERAZINE HYDROCHLORIDE 2 MG: 2 TABLET, FILM COATED ORAL at 14:34

## 2017-06-13 RX ADMIN — NICOTINE POLACRILEX 4 MG: 2 GUM, CHEWING ORAL at 10:08

## 2017-06-13 RX ADMIN — OLANZAPINE 10 MG: 10 TABLET, ORALLY DISINTEGRATING ORAL at 18:31

## 2017-06-13 RX ADMIN — LORAZEPAM 0.5 MG: 0.5 TABLET ORAL at 16:07

## 2017-06-13 RX ADMIN — NICOTINE POLACRILEX 4 MG: 2 GUM, CHEWING ORAL at 14:35

## 2017-06-13 RX ADMIN — OLANZAPINE 10 MG: 10 TABLET, ORALLY DISINTEGRATING ORAL at 12:06

## 2017-06-13 RX ADMIN — LORAZEPAM 0.5 MG: 0.5 TABLET ORAL at 11:18

## 2017-06-13 RX ADMIN — LITHIUM CARBONATE 300 MG: 300 TABLET, EXTENDED RELEASE ORAL at 08:55

## 2017-06-13 RX ADMIN — TRIFLUOPERAZINE HYDROCHLORIDE 4 MG: 2 TABLET, FILM COATED ORAL at 08:55

## 2017-06-13 ASSESSMENT — ACTIVITIES OF DAILY LIVING (ADL)
DRESS: INDEPENDENT
DRESS: INDEPENDENT
ORAL_HYGIENE: INDEPENDENT
GROOMING: INDEPENDENT
ORAL_HYGIENE: INDEPENDENT
LAUNDRY: WITH SUPERVISION
HYGIENE/GROOMING: INDEPENDENT

## 2017-06-13 NOTE — PROGRESS NOTES
"[facility], [place]   Psychiatric Progress Note         Interim History and Subjective Reports:   The patient's care was discussed with the treatment team during the daily team meeting.  Staff reports: she asked staff if she could use the foam pad on her bathroom door as a punching mat to expel her frustration related to continued hospitalization.     No complaints regarding mood other than feeling frustrated with ongoing hospitalization.  Patient denies SI/HI.  Patient denies psychosis/AH/VH./paranoia.    She is bored with the groups and routines here.   Sleep is fair.         Scheduled Medications:       eszopiclone  3 mg Oral At Bedtime     melatonin  9 mg Oral At Bedtime     nicotine   Transdermal Q8H     nicotine   Transdermal Daily     nicotine  1 patch Transdermal Daily     trifluoperazine  4 mg Oral BID     trifluoperazine  2 mg Oral Daily     lithium  300 mg Oral QAM     lithium  600 mg Oral QPM     prazosin  2 mg Oral At Bedtime          Allergies:      Allergies   Allergen Reactions     Adhesive Tape Hives     Prednisone Other (See Comments) and Hives     Suicidal ideation     Droperidol Anxiety          Labs:     No results found for this or any previous visit (from the past 24 hour(s)).       Vitals:   /86  Pulse 116  Temp 96.8  F (36  C) (Oral)  Resp 16  Ht 1.676 m (5' 6\")  Wt 90.7 kg (200 lb)  SpO2 97%  BMI 32.28 kg/m2  Weight: 200 lbs 0 oz          Height: 5' 6\"           Body mass index is 32.28 kg/(m^2).        Mental Status Examination:   Appearance: awake, alert and adequately groomed  Attitude:  cooperative  Eye Contact:  poor   Mood:  better  Affect:  appropriate and in normal range  Speech:  clear, coherent  Psychomotor Behavior:  no evidence of tardive dyskinesia, dystonia, or tics.   Throught Process:  linear  Associations:  no loose associations  Thought Content:  no evidence of suicidal ideation or homicidal ideation and no evidence of psychotic " thought   Insight:  partial  Judgement:  limited  Oriented to:  time, person, and place  Attention Span and Concentration:  limited  Recent and Remote Memory:  intact         DIagnoses:     1. Schizoaffective disorder - bipolar type  2. PTSD  3. Suspect borderline personality disorder.   4. History of Marijuana use disorder.   5. History of opiate use disorder         Plan:   1. Biological treatments:  -- Lithium level reviewed; 0.8; continue current dose  -- Cont Stelazine TID for reduction of psychosis  -- Cont lunesta for insomnia; Cont melatonin to augment    --Tolerating propranolol well to reduce anxiety and restlessness    2. Psychosocial treatments:   --addressed with SW consult and groups  --IRTS referral; awaiting acceptance   --she will meet with her  tomorrow to review dispo options.     3. Dispo:  --  We will attempt to transition her care out of the hospital and with family or at a crisis facility as she awaits the next opening for residential treatment

## 2017-06-13 NOTE — PROGRESS NOTES
"[facility], [place]   Psychiatric Progress Note         Interim History and Subjective Reports:   The patient's care was discussed with the treatment team during the daily team meeting.  Staff reports:  The patient was expecting to be discharged on Friday and was disappointed when the bed was no longer available at the residential treatment facility. She subsequently punched a brick wall out of frustration . There was no significant injury to her hand after the incident.    No complaints regarding mood other than feeling frustrated ongoing hospitalization.  Patient denies SI/HI.  Patient denies psychosis/AH/VH./paranoia.     She is bored with ongoing hospitalization.   Sleep is fair.  Nicotine cravings and frequently requests the gum.          Scheduled Medications:       eszopiclone  3 mg Oral At Bedtime     melatonin  9 mg Oral At Bedtime     nicotine   Transdermal Q8H     nicotine   Transdermal Daily     nicotine  1 patch Transdermal Daily     trifluoperazine  4 mg Oral BID     trifluoperazine  2 mg Oral Daily     lithium  300 mg Oral QAM     lithium  600 mg Oral QPM     prazosin  2 mg Oral At Bedtime          Allergies:      Allergies   Allergen Reactions     Adhesive Tape Hives     Prednisone Other (See Comments) and Hives     Suicidal ideation     Droperidol Anxiety          Labs:     No results found for this or any previous visit (from the past 24 hour(s)).       Vitals:   /86  Pulse 116  Temp 98.7  F (37.1  C)  Resp 16  Ht 1.676 m (5' 6\")  Wt 90.7 kg (200 lb)  SpO2 97%  BMI 32.28 kg/m2  Weight: 200 lbs 0 oz          Height: 5' 6\"           Body mass index is 32.28 kg/(m^2).        Mental Status Examination:   Appearance: awake, alert and adequately groomed  Attitude:  cooperative  Eye Contact:  poor   Mood:  better  Affect:  appropriate and in normal range  Speech:  clear, coherent  Psychomotor Behavior:  no evidence of tardive dyskinesia, dystonia, or tics. "   Throught Process:  linear  Associations:  no loose associations  Thought Content:  no evidence of suicidal ideation or homicidal ideation and no evidence of psychotic thought   Insight:  partial  Judgement:  limited  Oriented to:  time, person, and place  Attention Span and Concentration:  limited  Recent and Remote Memory:  intact         DIagnoses:     1. Schizoaffective disorder - bipolar type  2. PTSD  3. Suspect borderline personality disorder.   4. History of Marijuana use disorder.   5. History of opiate use disorder         Plan:   1. Biological treatments:  -- Lithium level reviewed; 0.8; continue current dose  -- Cont Stelazine TID for reduction of psychosis  -- add lunesta for insomnia; d/c sonata which was not effective; add melatonin to augment      2. Psychosocial treatments:   --addressed with SW consult and groups  --IRTS referral; awaiting acceptance     3. Dispo:  --  We will attempt to transition her care out of the hospital and with family or at a crisis facility as she awaits the next opening for residential treatment

## 2017-06-13 NOTE — PROGRESS NOTES
06/13/17 1400   Behavioral Health   Hallucinations denies / not responding to hallucinations   Thinking intact   Orientation person: oriented;place: oriented;date: oriented;time: oriented   Memory baseline memory   Insight insight appropriate to situation;insight appropriate to events   Judgement intact   Eye Contact at examiner   Affect blunted, flat   Mood mood is calm   ADL Assessment (WDL) WDL   Suicidality (WDL) WDL   Self Injury (WDL) WDL   Activity isolative;withdrawn  (Slept basically all shift)   Speech (WDL) WDL   Medication Sensitivity (WDL) WDL   Psychomotor Gait (WDL) WDL   Overt Agression (WDL) WDL   Psycho Education   Type of Intervention other (see comment)   Response observes from a distance   Activities of Daily Living   Hygiene/Grooming independent   Oral Hygiene independent   Dress independent   Laundry with supervision   Room Organization independent   patient slept basically all shift, only coming out for meals.

## 2017-06-13 NOTE — PROGRESS NOTES
The [facility] targeted  called me yesterday in response to a conversation with the coverage worker and placement difficulties. She said she wasn't able to do any more than we would and could not offer any suggestions.    Valdo Olivier  Ph: [phone]  fax: [phone]  I placed a call yesterday afternoon and this morning to the targeted . She is not supporting a discharge to the community - [facility] or mothers at this time.   Pt and I called mother who did agree to take her home.  I made a referral to the [facility] day treatment program. They can not do an assessment until July 1 so I will look at [facility].  Pt and I called Valdo to ask if she would support pt going to mother's and day treatment. Pt said she would check with the  and get back to us.

## 2017-06-13 NOTE — PROGRESS NOTES
"   06/12/17 2100   Behavioral Health   Hallucinations denies / not responding to hallucinations   Thinking intact   Orientation person: oriented;place: oriented;date: oriented;time: oriented   Memory baseline memory   Insight poor   Judgement impaired   Eye Contact at examiner   Affect tense   Mood anxious   Physical Appearance/Attire attire appropriate to age and situation   Hygiene well groomed   Suicidality other (see comments)  (denies)   Self Injury other (see comment)  (denies)   Activity isolative;withdrawn;other (see comment)  (attended group this evening)   Speech clear;coherent   Medication Sensitivity no stated side effects;no observed side effects   Psychomotor / Gait balanced;steady   Activities of Daily Living   Hygiene/Grooming independent   Oral Hygiene independent   Dress independent   Laundry with supervision   Room Organization independent     Pt was isolative, attended group but decided to \"pass\" when asked to share.  "

## 2017-06-14 ENCOUNTER — TELEPHONE (OUTPATIENT)
Dept: BEHAVIORAL HEALTH | Facility: CLINIC | Age: 27
End: 2017-06-14

## 2017-06-14 PROCEDURE — 97150 GROUP THERAPEUTIC PROCEDURES: CPT | Mod: GO

## 2017-06-14 PROCEDURE — 25000132 ZZH RX MED GY IP 250 OP 250 PS 637: Performed by: PSYCHIATRY & NEUROLOGY

## 2017-06-14 PROCEDURE — 90853 GROUP PSYCHOTHERAPY: CPT

## 2017-06-14 PROCEDURE — 99232 SBSQ HOSP IP/OBS MODERATE 35: CPT | Performed by: PSYCHIATRY & NEUROLOGY

## 2017-06-14 PROCEDURE — 12400001 ZZH R&B MH UMMC

## 2017-06-14 RX ADMIN — LITHIUM CARBONATE 300 MG: 300 TABLET, EXTENDED RELEASE ORAL at 09:30

## 2017-06-14 RX ADMIN — LORAZEPAM 0.5 MG: 0.5 TABLET ORAL at 11:19

## 2017-06-14 RX ADMIN — IBUPROFEN 400 MG: 200 TABLET, FILM COATED ORAL at 21:10

## 2017-06-14 RX ADMIN — TRIFLUOPERAZINE HYDROCHLORIDE 4 MG: 2 TABLET, FILM COATED ORAL at 20:21

## 2017-06-14 RX ADMIN — PROPRANOLOL HYDROCHLORIDE 10 MG: 10 TABLET ORAL at 20:21

## 2017-06-14 RX ADMIN — TRIFLUOPERAZINE HYDROCHLORIDE 4 MG: 2 TABLET, FILM COATED ORAL at 09:31

## 2017-06-14 RX ADMIN — ESZOPICLONE 3 MG: 3 TABLET, FILM COATED ORAL at 20:20

## 2017-06-14 RX ADMIN — LITHIUM CARBONATE 600 MG: 300 TABLET, EXTENDED RELEASE ORAL at 20:21

## 2017-06-14 RX ADMIN — TRIFLUOPERAZINE HYDROCHLORIDE 2 MG: 2 TABLET, FILM COATED ORAL at 14:24

## 2017-06-14 RX ADMIN — NICOTINE 1 PATCH: 14 PATCH, EXTENDED RELEASE TRANSDERMAL at 09:29

## 2017-06-14 RX ADMIN — Medication 9 MG: at 20:20

## 2017-06-14 RX ADMIN — PRAZOSIN HYDROCHLORIDE 2 MG: 2 CAPSULE ORAL at 20:21

## 2017-06-14 RX ADMIN — NICOTINE POLACRILEX 4 MG: 2 GUM, CHEWING ORAL at 19:18

## 2017-06-14 RX ADMIN — PROPRANOLOL HYDROCHLORIDE 10 MG: 10 TABLET ORAL at 09:30

## 2017-06-14 ASSESSMENT — ACTIVITIES OF DAILY LIVING (ADL)
DRESS: INDEPENDENT
GROOMING: INDEPENDENT
LAUNDRY: WITH SUPERVISION
GROOMING: INDEPENDENT
DRESS: INDEPENDENT
ORAL_HYGIENE: INDEPENDENT
ORAL_HYGIENE: INDEPENDENT

## 2017-06-14 NOTE — PLAN OF CARE
"Problem: Manic Symptoms  Goal: Manic Symptoms  Signs and symptoms of listed problems will be absent or manageable.   Patient will report less lability of mood  Patient will maintain appropriate boundaries with peers and staff  Patient speech will be notable for appropriate content, rate, and volume  Patient will report less difficulty falling and staying asleep   Outcome: Declining  Ayana has been angry and agitated this afternoon. She feels her plannned discharge was sabotaged by her [facility] . Ayana was feeling \"devastated\" this evening after her parents called and stated \"They would not let her return home since her [facility]  reported to them that she had been violent in the Hospital.\" Ayana was tearful at times and felt \"as if I will blow.\"  Ayana was able to seek this writer out and verbalize her frustrations. Ayana requested and received some prn medication that has helped. Ayana would like everyone to know that \"I would never hurt or be violent toward any person here.\" Ayana has been encouraged to attend groups and programing on the unit to demonstrate she is working on self care and readiness for discharge. Ayana's friend visited this evening and she has been smiling and laughing.      "

## 2017-06-14 NOTE — PLAN OF CARE
"Problem: Manic Symptoms  Goal: Social and Therapeutic (Manic Symptoms)  Signs and symptoms of listed problems will be absent or manageable.   Outcome: Therapy, progress toward functional goals is gradual           [facility]. Patient attended groups and has been social with select peers. Patient continues to appear anxious and frustrated about still being here. Not outbursts. Ate meals and was med compliant. No stated si or sib. Affect bright at times, other times flat.      Patient evaluation continues. Assessed mood,anxiety,thoughts and behavior.      Patient gradually progressing towards goals.     Patient is encouraged to participate in groups and assisted to develop healthy coping skills.      VS reviewed: /73  Pulse 91  Temp 97  F (36.1  C)  Resp 16  Ht 1.676 m (5' 6\")  Wt 90.7 kg (200 lb)  SpO2 97%  BMI 32.28 kg/m2     Length of stay: 33     Refer to daily team meeting notes for individualized plan of care. Nursing will continue to assess.             "

## 2017-06-14 NOTE — TELEPHONE ENCOUNTER
Ayana was referred to the IOP program. Per her chart from [facility], Ayana has been in the hospital since 5/12/17. She was petitioned for commitment and is under a full commitment through [facility]. The plan had been for IRTS placement. However, she was unable to get quickly accepted in to an IRTS placement so the inpatient team decided to pursue the idea of Aynaa returning to home with mother and a referral to IOP program. The [facility]  does not support this plan as of yesterday. Therefore, Ayana's mother also changed her mind about the idea. Ayana will not be assessed for IOP programming at present time    In reviewing her chart, there are also a few concerning factors. Ayana has required restraints and seclusion while on the unit due to violence including yelling obscenities at staff and throwing chairs. She also has punched a wall on a couple occasions when frustrated or upset. This happened within the past few days after she heard she was not able to go to [facility] this week. She has also threatened to cause harm to staff and has threatened to act out so a behavioral code needed to be called. These are all behaviors that are concerning related to her ability to attend the IOP program.     Given the above information about her very recent behaviors (threats and punching walls) and the given situation where she most likely will require an IRTS placement (and will have to wait in the hospital for an opening), the referral to the IOP program will be closed at present time. Writer informed the [facility] on [facility], Tejal Tineo, about this decision.

## 2017-06-14 NOTE — PROGRESS NOTES
"MANISHA Vadlo spoke with mother and then mother reversed her position to take the pt home. This upset pt last night.  [facility] Day treatment called to say that they could do an intake though they are worried about the aggression. However I reported that pt is now not able to go stay with her mother.  Pt has an interview with Diony Ghislaine on Monday at 12noon. I told them that they will have to come here for the interview.  MANISHA Valdo is coming to meet with the patient Thursday. I have a call into her to ask the time as mother wants to participate by phone.    Pt went to 2 groups today for the first time.  Pt asked to speak to me. She paced in the interview room and she was agitated. She said she \"wasn't violent\" and I said the word people are using is \"aggressive.\" We talked through her reluctance to surrender and start engaging in treatment. I told her that I was going to do a group on radical acceptance and I thought she would benefit from it. Pt then said so ' if I go to groups and stop hitting walls then I can leave her.\" I said \"pretty much.\"  Pt then came to the 3rd group of the day and participated in the session on Radical Acceptance. She identified that she was not accepting that she had to be in the hospital. She watched the DVD, reviewed the handout and participated in the discussion. We checked in after the video and she is reporting that she did get a lot out of it.  "

## 2017-06-14 NOTE — PLAN OF CARE
Problem: General Plan of Care (Inpatient Behavioral)  Goal: Team Discussion  Team Plan:   Outcome: Improving  BEHAVIORAL TEAM DISCUSSION     Participants:   Dr. Santana, Lissett Gaston RN and Tejal EGAN     Progress:   Pt seems to be developing some insight into her situation here.  Pt is responding to events that frustrate her by hitting walls. Last night she did this in a controlled fashion with nursing staff and the bathroom foam door.  Pt has had x-rays of her hand.  Pt went to groups today for the first time.     Continued Stay Criteria/Rationale:   Pt is in a civil commitment and does not have a treatment placement lined up.  Pt continues with aggressive behavior.     Medical/Physical:   X-rays have been negative     Precautions:   Behavioral Orders   Procedures     Code 1 - Restrict to Unit     Fall precautions     [place]     MMPI 2     Routine Programming       As clinically indicated     Status 15       Every 15 minutes.     Plan:   Provisional discharge to a treatment facility  Interview on unit on June 19 for Claudia Scanlon.        Rationale for change in precautions or plan:   Pt continues to be declined at treatment venues due to lack of engagement in recovery and punching walls

## 2017-06-14 NOTE — PROGRESS NOTES
06/13/17 2008   Behavioral Health   Hallucinations auditory   Thinking distractable   Orientation person: oriented;place: oriented;date: oriented;time: oriented   Memory baseline memory   Insight admits / accepts   Judgement impaired   Eye Contact at examiner   Affect irritable;tense   Mood irritable;anxious   Physical Appearance/Attire attire appropriate to age and situation   Hygiene well groomed   Suicidality other (see comments)  (Denies)   Self Injury other (see comment)  (Denies)   Activity withdrawn;restless   Speech coherent;clear   Medication Sensitivity no observed side effects;no stated side effects   Psychomotor / Gait balanced;steady   Psycho Education   Type of Intervention 1:1 intervention   Response participates, initiates socially appropriate   Hours 0.5   Treatment Detail Check in   Activities of Daily Living   Hygiene/Grooming independent   Oral Hygiene independent   Dress independent   Room Organization independent     Ayana was withdrawn and appeared agitated for most of the evening. She was hoping to be discharged today and became very irritable and anxious after finding out this was not going to happen. She rated her irritation 7/10 and anxiety 8/10. Ayana was able to utilize positive coping skills and PRNs to manage her anxiety effectively. Her primary concern is to find out when she will be returning home.

## 2017-06-14 NOTE — PROGRESS NOTES
06/14/17 1139   Occupational Therapy   Treatment Detail Reticent engagement in life skills activity this morning; restricted affect. During afternoon OT clinic, pt kept to self, working on a familiar, low-demand task.  Pt asked to leave group fifteen minutes before end (did not explain why), but was encouraged to continue working and did stay for 8 more minutes.

## 2017-06-14 NOTE — PROGRESS NOTES
"[facility], [place]   Psychiatric Progress Note         Interim History and Subjective Reports:   The patient's care was discussed with the treatment team during the daily team meeting.  Staff reports: she asked staff if she could use the foam pad on her bathroom door as a punching mat to expel her frustration related to continued hospitalization.     No complaints regarding mood other than feeling frustrated with ongoing hospitalization.  Patient denies SI/HI.  Patient denies psychosis/AH/VH./paranoia.    She is bored with the groups and routines here.   Sleep is fair.         Scheduled Medications:       propranolol  10 mg Oral BID     eszopiclone  3 mg Oral At Bedtime     melatonin  9 mg Oral At Bedtime     nicotine   Transdermal Q8H     nicotine   Transdermal Daily     nicotine  1 patch Transdermal Daily     trifluoperazine  4 mg Oral BID     trifluoperazine  2 mg Oral Daily     lithium  300 mg Oral QAM     lithium  600 mg Oral QPM     prazosin  2 mg Oral At Bedtime          Allergies:      Allergies   Allergen Reactions     Adhesive Tape Hives     Prednisone Other (See Comments) and Hives     Suicidal ideation     Droperidol Anxiety          Labs:     No results found for this or any previous visit (from the past 24 hour(s)).       Vitals:   /73  Pulse 91  Temp 97  F (36.1  C)  Resp 16  Ht 1.676 m (5' 6\")  Wt 90.7 kg (200 lb)  SpO2 97%  BMI 32.28 kg/m2  Weight: 200 lbs 0 oz          Height: 5' 6\"           Body mass index is 32.28 kg/(m^2).        Mental Status Examination:   Appearance: awake, alert and adequately groomed  Attitude:  cooperative  Eye Contact:  poor   Mood:  better  Affect:  appropriate and in normal range  Speech:  clear, coherent  Psychomotor Behavior:  no evidence of tardive dyskinesia, dystonia, or tics.   Throught Process:  linear  Associations:  no loose associations  Thought Content:  no evidence of suicidal ideation or homicidal ideation and no " evidence of psychotic thought   Insight:  partial  Judgement:  limited  Oriented to:  time, person, and place  Attention Span and Concentration:  limited  Recent and Remote Memory:  intact         DIagnoses:     1. Schizoaffective disorder - bipolar type  2. PTSD  3. Suspect borderline personality disorder.   4. History of Marijuana use disorder.   5. History of opiate use disorder         Plan:   1. Biological treatments:  -- Lithium level reviewed; 0.8; continue current dose  -- Cont Stelazine TID for reduction of psychosis  -- Cont lunesta for insomnia; Cont melatonin to augment    --Tolerating propranolol well to reduce anxiety and restlessness    2. Psychosocial treatments:   --addressed with SW consult and groups  --IRTS referral; awaiting acceptance   --she will meet with her  tomorrow to review dispo options.     3. Dispo:  --  We will attempt to transition her care out of the hospital and with family or at a crisis facility as she awaits the next opening for residential treatment

## 2017-06-15 PROCEDURE — 12400001 ZZH R&B MH UMMC

## 2017-06-15 PROCEDURE — 25000132 ZZH RX MED GY IP 250 OP 250 PS 637: Performed by: PSYCHIATRY & NEUROLOGY

## 2017-06-15 PROCEDURE — 90853 GROUP PSYCHOTHERAPY: CPT

## 2017-06-15 RX ADMIN — NICOTINE POLACRILEX 2 MG: 2 GUM, CHEWING ORAL at 14:11

## 2017-06-15 RX ADMIN — TRIFLUOPERAZINE HYDROCHLORIDE 4 MG: 2 TABLET, FILM COATED ORAL at 20:13

## 2017-06-15 RX ADMIN — LORAZEPAM 0.5 MG: 0.5 TABLET ORAL at 11:16

## 2017-06-15 RX ADMIN — ESZOPICLONE 3 MG: 3 TABLET, FILM COATED ORAL at 20:13

## 2017-06-15 RX ADMIN — TRIFLUOPERAZINE HYDROCHLORIDE 4 MG: 2 TABLET, FILM COATED ORAL at 09:02

## 2017-06-15 RX ADMIN — PRAZOSIN HYDROCHLORIDE 2 MG: 2 CAPSULE ORAL at 20:13

## 2017-06-15 RX ADMIN — NICOTINE POLACRILEX 4 MG: 2 GUM, CHEWING ORAL at 17:32

## 2017-06-15 RX ADMIN — NICOTINE 1 PATCH: 14 PATCH, EXTENDED RELEASE TRANSDERMAL at 09:02

## 2017-06-15 RX ADMIN — LITHIUM CARBONATE 600 MG: 300 TABLET, EXTENDED RELEASE ORAL at 20:13

## 2017-06-15 RX ADMIN — PROPRANOLOL HYDROCHLORIDE 10 MG: 10 TABLET ORAL at 20:13

## 2017-06-15 RX ADMIN — TRIFLUOPERAZINE HYDROCHLORIDE 2 MG: 2 TABLET, FILM COATED ORAL at 14:11

## 2017-06-15 RX ADMIN — Medication 9 MG: at 20:13

## 2017-06-15 RX ADMIN — NICOTINE POLACRILEX 4 MG: 2 GUM, CHEWING ORAL at 12:44

## 2017-06-15 RX ADMIN — LITHIUM CARBONATE 300 MG: 300 TABLET, EXTENDED RELEASE ORAL at 09:02

## 2017-06-15 RX ADMIN — NICOTINE POLACRILEX 2 MG: 2 GUM, CHEWING ORAL at 09:10

## 2017-06-15 RX ADMIN — PROPRANOLOL HYDROCHLORIDE 10 MG: 10 TABLET ORAL at 09:02

## 2017-06-15 ASSESSMENT — ACTIVITIES OF DAILY LIVING (ADL)
GROOMING: HANDWASHING;SHOWER;INDEPENDENT
LAUNDRY: UNABLE TO COMPLETE
ORAL_HYGIENE: INDEPENDENT
DRESS: STREET CLOTHES;INDEPENDENT

## 2017-06-15 NOTE — PROGRESS NOTES
DATA:   Patient participated in mental health group facilitated by Christie Jones.  Patient engaged in the written activity as well as the discussion portion of the group.     ASSESSMENT:   Patient was engaged in activity.  She opened up about her specific situation and appeared to get something out of group.  She seemed to really connect with a peer that had similar experience.  Patient was open to accepting feedback and support from facilitator and peers.   PLAN:   Continue to encourage his/her participation in offered groups during hospital stay.    DESIRE Vargas, [facility]  Clinical Treatment Coordinator          06/15/17 1500   Psycho Education   Type of Intervention structured groups  (Mental Health Group)   Response participates, initiates socially appropriate   Hours 1   Treatment Detail Radical Acceptance Exercise

## 2017-06-15 NOTE — PLAN OF CARE
Problem: General Plan of Care (Inpatient Behavioral)  Goal: Individualization/Patient Specific Goal (IP Behavioral)  The patient and/or their representative will achieve their patient-specific goals related to the plan of care.    The patient-specific goals include:  Illness Management Recovery model: Objectives    Patient will identify reason(s) for hospitalization from their perspective.  Patient will identify a minimum of three goals for discharge.  Patient will identify a minimum of three triggers that may increase their symptoms.  Patient will identify a minimum of three coping skills they can do to stay well.   Patient will identify their support system to demonstrate readiness for discharge.   Outcome: Therapy, progress towards functional goals is fair  Illness Management Recovery model:  Taking Action.     Patient identified the following actions they can take when early warning signs are present in order to prevent relapse:     1. mom  2. Brother, sister  3. Sergio

## 2017-06-15 NOTE — PROGRESS NOTES
06/14/17 2226   Behavioral Health   Hallucinations denies / not responding to hallucinations   Orientation person: oriented;place: oriented   Memory baseline memory   Insight admits / accepts   Judgement impaired   Affect blunted, flat   Mood mood is calm   Suicidality other (see comments)   Self Injury other (see comment)   Activity other (see comment)  (had visitor)   Activities of Daily Living   Hygiene/Grooming independent   Oral Hygiene independent   Dress independent   Room Organization independent

## 2017-06-15 NOTE — PLAN OF CARE
Problem: Manic Symptoms  Goal: Manic Symptoms  Signs and symptoms of listed problems will be absent or manageable.   Patient will report less lability of mood  Patient will maintain appropriate boundaries with peers and staff  Patient speech will be notable for appropriate content, rate, and volume  Patient will report less difficulty falling and staying asleep   Outcome: Therapy, unable to show any progress toward functional goals  Pt out in common areas most of shift. Pt attended all groups except missed part of group to interview with . Pt reports having a good day. Pt states is trying to practise radical acceptance about being here. I'm here. I can't control that I'm here or how long I'm here. I've been fighting it at making my life miserable, so now I'm going to try and make the best of it. I'm going to go to groups, try new things, talk with staff make the time go by. Pt took interest in learning to juggle. Social with peers and staff. Pt denies any suicidal thoughts.

## 2017-06-15 NOTE — PROGRESS NOTES
Occupational Therapy Initial Assessment:     Pt attended group independently and participated with encouragement.  Pt's affect was flat, however brightened as group went on.  Pt was able to attend to group topic for about 30 minutes and demonstrated some insight into current situation. Pt was open to feedback from staff and was able do discuss feelings in an appropriate way. Pt was able to identify positive ways to address anger (rather than punching this/breaking things). Pt reports she want to be more open to hearing other peoples opinions and she would work on the way she expressed her own feelings. Pt was appropriate  interactions with peers and staff, and demonstrated a willingness  to try some new coping skills.Pt did have a difficult time identifying positive support systems at this time.  Pt will continue to be offered groups for further assessment and to address goals identified on plan of care.          Patient will identify a minimum of three triggers that may increase their symptoms.  Patient will identify a minimum of three coping skills they can do to stay well.   Patient will identify their support system to demonstrate readiness for discharge.

## 2017-06-15 NOTE — PROGRESS NOTES
"[facility]  came to the unit today and completed the intake. Pt had been working on getting into a good space for this interview. The  reported \"I was pleasantly surprised.\" I joined them and we called mother on the phone and had a 4 way conference about pt's discharge plans. We all agreed that we were looking at having the pt go to an IRTS. Pt has an interview on Monday here on the unit with Beba Scanlon. [place] TCM asked me to call and talked to [facility] as that is where she has had the best luck with services. I said that they were not willing to interview pt but welcomed me to call again if I felt like I could advocate for the pt. I said that I now feel like I can advocate for the pt and I did call [facility] and left a vm saying I am advocating for the pt to be interviewed.  "

## 2017-06-16 PROCEDURE — H2032 ACTIVITY THERAPY, PER 15 MIN: HCPCS

## 2017-06-16 PROCEDURE — 99232 SBSQ HOSP IP/OBS MODERATE 35: CPT | Performed by: PSYCHIATRY & NEUROLOGY

## 2017-06-16 PROCEDURE — 25000132 ZZH RX MED GY IP 250 OP 250 PS 637: Performed by: PSYCHIATRY & NEUROLOGY

## 2017-06-16 PROCEDURE — 12400001 ZZH R&B MH UMMC

## 2017-06-16 RX ORDER — NICOTINE 21 MG/24HR
1 PATCH, TRANSDERMAL 24 HOURS TRANSDERMAL DAILY
Status: DISCONTINUED | OUTPATIENT
Start: 2017-06-16 | End: 2017-06-27 | Stop reason: HOSPADM

## 2017-06-16 RX ADMIN — LITHIUM CARBONATE 300 MG: 300 TABLET, EXTENDED RELEASE ORAL at 09:19

## 2017-06-16 RX ADMIN — LITHIUM CARBONATE 600 MG: 300 TABLET, EXTENDED RELEASE ORAL at 20:06

## 2017-06-16 RX ADMIN — TRIFLUOPERAZINE HYDROCHLORIDE 4 MG: 2 TABLET, FILM COATED ORAL at 20:08

## 2017-06-16 RX ADMIN — TRIFLUOPERAZINE HYDROCHLORIDE 2 MG: 2 TABLET, FILM COATED ORAL at 13:57

## 2017-06-16 RX ADMIN — PROPRANOLOL HYDROCHLORIDE 10 MG: 10 TABLET ORAL at 09:19

## 2017-06-16 RX ADMIN — Medication 9 MG: at 20:09

## 2017-06-16 RX ADMIN — IBUPROFEN 400 MG: 200 TABLET, FILM COATED ORAL at 13:57

## 2017-06-16 RX ADMIN — NICOTINE 1 PATCH: 14 PATCH, EXTENDED RELEASE TRANSDERMAL at 09:20

## 2017-06-16 RX ADMIN — LORAZEPAM 0.5 MG: 0.5 TABLET ORAL at 13:57

## 2017-06-16 RX ADMIN — ESZOPICLONE 3 MG: 3 TABLET, FILM COATED ORAL at 20:08

## 2017-06-16 RX ADMIN — NICOTINE 1 PATCH: 21 PATCH, EXTENDED RELEASE TRANSDERMAL at 17:28

## 2017-06-16 RX ADMIN — PROPRANOLOL HYDROCHLORIDE 10 MG: 10 TABLET ORAL at 20:07

## 2017-06-16 RX ADMIN — IBUPROFEN 400 MG: 200 TABLET, FILM COATED ORAL at 18:27

## 2017-06-16 RX ADMIN — PRAZOSIN HYDROCHLORIDE 2 MG: 2 CAPSULE ORAL at 20:07

## 2017-06-16 RX ADMIN — TRIFLUOPERAZINE HYDROCHLORIDE 4 MG: 2 TABLET, FILM COATED ORAL at 09:19

## 2017-06-16 ASSESSMENT — ACTIVITIES OF DAILY LIVING (ADL)
ORAL_HYGIENE: INDEPENDENT
LAUNDRY: WITH SUPERVISION
DRESS: STREET CLOTHES
LAUNDRY: UNABLE TO COMPLETE
ORAL_HYGIENE: INDEPENDENT
GROOMING: INDEPENDENT
GROOMING: INDEPENDENT
DRESS: INDEPENDENT

## 2017-06-16 NOTE — PLAN OF CARE
"Problem: Manic Symptoms  Goal: Manic Symptoms  Signs and symptoms of listed problems will be absent or manageable.   Patient will report less lability of mood  Patient will maintain appropriate boundaries with peers and staff  Patient speech will be notable for appropriate content, rate, and volume  Patient will report less difficulty falling and staying asleep   Outcome: Improving  Pt continues to do well despite being frustrated by being inpatient for over a month. She participates well in groups, is calm, social and considerate with peers. She enjoyed an extended visit tonight. She felt \"overly medicated\" yet \"very happy\" after taking her evening medications. She reluctantly accepts her DC challenges.      "

## 2017-06-16 NOTE — PROGRESS NOTES
"[facility], [place]   Psychiatric Progress Note         Interim History and Subjective Reports:   The patient's care was discussed with the treatment team during the daily team meeting.  Staff reports: the patient is doing better. No outbursts or self harming behavior. Attending groups more consistently and more engaged n approach. Decrease use of PRNs. No overt psychosis, haven or confusion. Slept 7.5hr. No SI or SHELLEY reported. Independent with ADL.     The patient noted that she is feeling better. No depression, SI or SHELLEY. Noted tolerating medications well. Anxiety improved. Asked to increase Nicotine patch and plans to limit gum use. Noted sleeping well and appetite is intact. No hallucinations, racing thoughts or paranoia. She is future oriented and believes that she is ready to transition to IRT.     Discussed medications and care plan.        Scheduled Medications:       propranolol  10 mg Oral BID     eszopiclone  3 mg Oral At Bedtime     melatonin  9 mg Oral At Bedtime     nicotine   Transdermal Q8H     nicotine   Transdermal Daily     nicotine  1 patch Transdermal Daily     trifluoperazine  4 mg Oral BID     trifluoperazine  2 mg Oral Daily     lithium  300 mg Oral QAM     lithium  600 mg Oral QPM     prazosin  2 mg Oral At Bedtime          Allergies:      Allergies   Allergen Reactions     Adhesive Tape Hives     Prednisone Other (See Comments) and Hives     Suicidal ideation     Droperidol Anxiety          Labs:     No results found for this or any previous visit (from the past 24 hour(s)).       Vitals:     Vitals:    06/14/17 0900 06/14/17 2013 06/15/17 0855 06/15/17 1952   BP: 107/73 119/86  129/82   BP Location:   Right arm    Pulse: 91 96  107   Resp: 16  16    Temp: 97  F (36.1  C)  97.6  F (36.4  C) 98.7  F (37.1  C)   TempSrc:    Oral   SpO2:       Weight:   92.1 kg (203 lb)    Height:           Weight: 203 lbs 0 oz          Height: 5' 6\"           Body mass index is " "32.77 kg/(m^2).        Mental Status Examination:   Appearance: awake, alert, appeared as age stated, well groomed and no apparent distress  Attitude:  cooperative  Eye Contact:  good  Mood:  better and \"just a little anxiety\"  Affect:  appropriate and in normal range and intensity is normal. Brighten and engaged.   Speech:  clear, coherent  Psychomotor Behavior:  no evidence of tardive dyskinesia, dystonia, or tics.   Throught Process:  linear  Associations:  no loose associations  Thought Content:  no evidence of suicidal ideation or homicidal ideation and no evidence of psychotic thought   Insight:  fair  Judgement:  fair  Oriented to:  time, person, and place  Attention Span and Concentration:  fair  Recent and Remote Memory:  intact         DIagnoses:     1. Schizoaffective disorder - bipolar type  2. PTSD  3. Suspect borderline personality disorder.   4. History of Marijuana use disorder.   5. History of opiate use disorder         Plan:   1. Biological treatments:  -- Lithium level reviewed; 0.8; continue at 300 mg qday and 600 mg qhs.   -- Stelazine: continued at 4 mg BID and 3 mg q2pm for reduction of psychosis  -- Lunesta and Melatonin for insomnia.   --Tolerating propranolol well to reduce anxiety and restlessness  -- Prazosin: continued at 2 mg qhs.   -- PRN Ativan for anxiety. Advised to limit.     2. Legal Status and Dispo:  -- under full commitment.   -- IRTS referral; awaiting acceptance. Interview on Monday with staff from [facility] and possible opening at [facility] later in the week.   --  We will attempt to transition her care out of the hospital and with family or at a crisis facility as she awaits the next opening for residential treatment           "

## 2017-06-16 NOTE — PROGRESS NOTES
06/16/17 1152   Occupational Therapy   Treatment Detail Joined activity but spoke little.  Willing to learn new task and did grasp concept.

## 2017-06-16 NOTE — PROGRESS NOTES
06/16/17 1200   Significant Event   Significant Event Other (see comments)     Pt woke up mid-morning reporting that she was tired.  Pt attended some groups this shift and participated appropriately.  Pt displays a flat and blunted affect.  Pt denies depression and anxiety.  No SI/SIB.  No behavioral escalation/agitation noted today.  Will continue to assess.

## 2017-06-17 PROCEDURE — 25000132 ZZH RX MED GY IP 250 OP 250 PS 637: Performed by: PSYCHIATRY & NEUROLOGY

## 2017-06-17 PROCEDURE — 12400001 ZZH R&B MH UMMC

## 2017-06-17 PROCEDURE — 25000125 ZZHC RX 250: Performed by: PSYCHIATRY & NEUROLOGY

## 2017-06-17 RX ADMIN — TRIFLUOPERAZINE HYDROCHLORIDE 4 MG: 2 TABLET, FILM COATED ORAL at 20:24

## 2017-06-17 RX ADMIN — PRAZOSIN HYDROCHLORIDE 2 MG: 2 CAPSULE ORAL at 20:24

## 2017-06-17 RX ADMIN — Medication 9 MG: at 20:25

## 2017-06-17 RX ADMIN — NICOTINE 1 PATCH: 21 PATCH, EXTENDED RELEASE TRANSDERMAL at 08:29

## 2017-06-17 RX ADMIN — TRIFLUOPERAZINE HYDROCHLORIDE 4 MG: 2 TABLET, FILM COATED ORAL at 08:32

## 2017-06-17 RX ADMIN — ONDANSETRON 4 MG: 4 TABLET, ORALLY DISINTEGRATING ORAL at 18:33

## 2017-06-17 RX ADMIN — LITHIUM CARBONATE 300 MG: 300 TABLET, EXTENDED RELEASE ORAL at 08:31

## 2017-06-17 RX ADMIN — OLANZAPINE 10 MG: 10 TABLET, ORALLY DISINTEGRATING ORAL at 14:58

## 2017-06-17 RX ADMIN — LORAZEPAM 0.5 MG: 0.5 TABLET ORAL at 12:05

## 2017-06-17 RX ADMIN — PROPRANOLOL HYDROCHLORIDE 10 MG: 10 TABLET ORAL at 08:31

## 2017-06-17 RX ADMIN — PROPRANOLOL HYDROCHLORIDE 10 MG: 10 TABLET ORAL at 20:24

## 2017-06-17 RX ADMIN — NICOTINE POLACRILEX 2 MG: 2 GUM, CHEWING ORAL at 20:29

## 2017-06-17 RX ADMIN — LITHIUM CARBONATE 600 MG: 300 TABLET, EXTENDED RELEASE ORAL at 20:24

## 2017-06-17 RX ADMIN — ESZOPICLONE 3 MG: 3 TABLET, FILM COATED ORAL at 20:25

## 2017-06-17 RX ADMIN — TRIFLUOPERAZINE HYDROCHLORIDE 2 MG: 2 TABLET, FILM COATED ORAL at 14:20

## 2017-06-17 ASSESSMENT — ACTIVITIES OF DAILY LIVING (ADL)
ORAL_HYGIENE: INDEPENDENT
DRESS: STREET CLOTHES;INDEPENDENT
GROOMING: HANDWASHING;SHOWER;INDEPENDENT
GROOMING: HANDWASHING;INDEPENDENT
DRESS: STREET CLOTHES;INDEPENDENT
LAUNDRY: WITH SUPERVISION
LAUNDRY: WITH SUPERVISION
ORAL_HYGIENE: INDEPENDENT

## 2017-06-17 NOTE — PLAN OF CARE
Problem: Manic Symptoms  Goal: Manic Symptoms  Signs and symptoms of listed problems will be absent or manageable.   Patient will report less lability of mood  Patient will maintain appropriate boundaries with peers and staff  Patient speech will be notable for appropriate content, rate, and volume  Patient will report less difficulty falling and staying asleep   Outcome: Improving  Ayana has been visible in lounge all shift and has been social with her peers. Ayana has attended all groups today. Ayana has been medication compliant, pleasant, calm and cooperative. Ayana denies SI/HI/SIB/AH/VH.

## 2017-06-17 NOTE — PLAN OF CARE
Problem: General Plan of Care (Inpatient Behavioral)  Goal: Individualization/Patient Specific Goal (IP Behavioral)  The patient and/or their representative will achieve their patient-specific goals related to the plan of care.    The patient-specific goals include:  Illness Management Recovery model: Objectives    Patient will identify reason(s) for hospitalization from their perspective.  Patient will identify a minimum of three goals for discharge.  Patient will identify a minimum of three triggers that may increase their symptoms.  Patient will identify a minimum of three coping skills they can do to stay well.   Patient will identify their support system to demonstrate readiness for discharge.   Outcome: Therapy, progress toward functional goals as expected  Illness Management Recovery model:  Ways to [place].     Patient identified the following specific strategies to cope with their symptoms:     1. Stay on my medications work with Dr on med changes  2. Refrain from drug use.  3. Talk about my sx's with my support people, even my hallucinations.         Problem: Manic Symptoms  Goal: Manic Symptoms  Signs and symptoms of listed problems will be absent or manageable.   Patient will report less lability of mood  Patient will maintain appropriate boundaries with peers and staff  Patient speech will be notable for appropriate content, rate, and volume  Patient will report less difficulty falling and staying asleep   Outcome: Therapy, progress toward functional goals as expected  Pt out in common areas most of shift.  Pt attended all groups. Pt reports stable mood. Pt continues to focus on being patient while awaiting placement and working on making the best of it. Pt reported hearing voices since adolescence, but never sharing this with anyone. Reports going on long walk prior to hospitalization in response to command voices. Pt was a pleasant presence in milieu. Discussed strategies to help maintain more long term  stability.

## 2017-06-18 PROCEDURE — 90853 GROUP PSYCHOTHERAPY: CPT

## 2017-06-18 PROCEDURE — 12400001 ZZH R&B MH UMMC

## 2017-06-18 PROCEDURE — 25000132 ZZH RX MED GY IP 250 OP 250 PS 637: Performed by: PSYCHIATRY & NEUROLOGY

## 2017-06-18 RX ADMIN — TRIFLUOPERAZINE HYDROCHLORIDE 2 MG: 2 TABLET, FILM COATED ORAL at 14:06

## 2017-06-18 RX ADMIN — PROPRANOLOL HYDROCHLORIDE 10 MG: 10 TABLET ORAL at 20:13

## 2017-06-18 RX ADMIN — LITHIUM CARBONATE 600 MG: 300 TABLET, EXTENDED RELEASE ORAL at 20:12

## 2017-06-18 RX ADMIN — Medication 9 MG: at 20:12

## 2017-06-18 RX ADMIN — PRAZOSIN HYDROCHLORIDE 2 MG: 2 CAPSULE ORAL at 20:13

## 2017-06-18 RX ADMIN — NICOTINE 1 PATCH: 21 PATCH, EXTENDED RELEASE TRANSDERMAL at 09:01

## 2017-06-18 RX ADMIN — TRIFLUOPERAZINE HYDROCHLORIDE 4 MG: 2 TABLET, FILM COATED ORAL at 20:12

## 2017-06-18 RX ADMIN — TRIFLUOPERAZINE HYDROCHLORIDE 4 MG: 2 TABLET, FILM COATED ORAL at 09:01

## 2017-06-18 RX ADMIN — LORAZEPAM 0.5 MG: 0.5 TABLET ORAL at 18:20

## 2017-06-18 RX ADMIN — ESZOPICLONE 3 MG: 3 TABLET, FILM COATED ORAL at 20:12

## 2017-06-18 RX ADMIN — PROPRANOLOL HYDROCHLORIDE 10 MG: 10 TABLET ORAL at 09:01

## 2017-06-18 RX ADMIN — LITHIUM CARBONATE 300 MG: 300 TABLET, EXTENDED RELEASE ORAL at 09:01

## 2017-06-18 ASSESSMENT — ACTIVITIES OF DAILY LIVING (ADL)
LAUNDRY: WITH SUPERVISION
GROOMING: HANDWASHING;SHOWER;INDEPENDENT
LAUNDRY: UNABLE TO COMPLETE
ORAL_HYGIENE: INDEPENDENT
DRESS: STREET CLOTHES;INDEPENDENT
DRESS: STREET CLOTHES;INDEPENDENT
ORAL_HYGIENE: INDEPENDENT
GROOMING: HANDWASHING;SHOWER;INDEPENDENT

## 2017-06-18 NOTE — PROGRESS NOTES
"   06/17/17 4608   Behavioral Health   Hallucinations denies / not responding to hallucinations   Thinking intact   Orientation person: oriented;place: oriented   Memory baseline memory   Insight poor   Judgement intact   Eye Contact at examiner   Affect full range affect   Mood mood is calm   Physical Appearance/Attire appears stated age;attire appropriate to age and situation;neat   Hygiene well groomed   Suicidality other (see comments)  (Currently denies SI.)   Self Injury other (see comment)  (Currently denies SIB urges/thoughts.)   Activity other (see comment)  (Socializing with peers throughout the evening. )   Speech clear;coherent   Medication Sensitivity no stated side effects;no observed side effects   Psychomotor / Gait balanced;steady   Overt Aggression Scale   Verbal Aggression 0   Aggression against Property 0   Auto-Aggression 0   Physical Aggression 0   Overt Aggression Total Score 0   Sleep/Rest/Relaxation   Day/Evening Time Hours up all shift   Coping/Psychosocial   Verbalized Emotional State acceptance   Plan Of Care Reviewed With patient   Patient Agreement with Plan of Care agrees   Supportive Measures relaxation techniques promoted;verbalization of feelings encouraged   Trust Relationship/Rapport questions answered;questions encouraged;reassurance provided   Activities of Daily Living   Hygiene/Grooming handwashing;independent   Oral Hygiene independent   Dress street clothes;independent   Laundry with supervision   Room Organization independent   Activity   Activity Level of Assistance independent     Ayana spent the majority of the evening interacting with peers in the common areas.  She had a friend visit this evening which she reports \"went well.\"  She currently denies auditory hallucinations.  She's slightly anxious about leaving possibly next week, although she reports feeling hopeful.  Her affect was brighter, pleasant, well kempt in appearance and overall appears more relaxed and " didn't engage in any pacing the hallways or appear anxious throughout the evening.

## 2017-06-18 NOTE — PLAN OF CARE
Problem: General Plan of Care (Inpatient Behavioral)  Goal: Individualization/Patient Specific Goal (IP Behavioral)  The patient and/or their representative will achieve their patient-specific goals related to the plan of care.    The patient-specific goals include:  Illness Management Recovery model: Objectives    Patient will identify reason(s) for hospitalization from their perspective.  Patient will identify a minimum of three goals for discharge.  Patient will identify a minimum of three triggers that may increase their symptoms.  Patient will identify a minimum of three coping skills they can do to stay well.   Patient will identify their support system to demonstrate readiness for discharge.   Outcome: Therapy, progress towards functional goals is fair  Illness Management Recovery model:  Self-Reflection & Planning.     Assessed patient's progress completing forms related to Illness Management Recovery (including Personal Plan of Care, Adult Coping Plan, and My Support and Coping Plan) and assisted as needed.     Encouraged patient to continue to consider triggers, wellness strategies, early warning signs, feedback from others, actions to take to prevent relapse, and coping strategies as part of a plan to remain well after leaving the hospital.

## 2017-06-18 NOTE — PLAN OF CARE
Problem: Manic Symptoms  Goal: Manic Symptoms  Signs and symptoms of listed problems will be absent or manageable.   Patient will report less lability of mood  Patient will maintain appropriate boundaries with peers and staff  Patient speech will be notable for appropriate content, rate, and volume  Patient will report less difficulty falling and staying asleep   Outcome: Therapy, progress towards functional goals is fair  Pt reports stable mood.  No suicidal thoughts. Slept in today after breakfast. Pt stated felt good after sleeping in.

## 2017-06-19 PROCEDURE — 90853 GROUP PSYCHOTHERAPY: CPT

## 2017-06-19 PROCEDURE — 12400001 ZZH R&B MH UMMC

## 2017-06-19 PROCEDURE — 25000132 ZZH RX MED GY IP 250 OP 250 PS 637: Performed by: PSYCHIATRY & NEUROLOGY

## 2017-06-19 PROCEDURE — 97150 GROUP THERAPEUTIC PROCEDURES: CPT | Mod: GO

## 2017-06-19 PROCEDURE — 99232 SBSQ HOSP IP/OBS MODERATE 35: CPT | Performed by: PSYCHIATRY & NEUROLOGY

## 2017-06-19 RX ADMIN — LITHIUM CARBONATE 300 MG: 300 TABLET, EXTENDED RELEASE ORAL at 08:42

## 2017-06-19 RX ADMIN — OLANZAPINE 10 MG: 10 TABLET, ORALLY DISINTEGRATING ORAL at 17:18

## 2017-06-19 RX ADMIN — TRIFLUOPERAZINE HYDROCHLORIDE 4 MG: 2 TABLET, FILM COATED ORAL at 08:42

## 2017-06-19 RX ADMIN — LORAZEPAM 0.5 MG: 0.5 TABLET ORAL at 21:51

## 2017-06-19 RX ADMIN — TRIFLUOPERAZINE HYDROCHLORIDE 2 MG: 2 TABLET, FILM COATED ORAL at 14:39

## 2017-06-19 RX ADMIN — NICOTINE POLACRILEX 2 MG: 2 GUM, CHEWING ORAL at 09:25

## 2017-06-19 RX ADMIN — Medication 9 MG: at 20:26

## 2017-06-19 RX ADMIN — IBUPROFEN 400 MG: 200 TABLET, FILM COATED ORAL at 18:04

## 2017-06-19 RX ADMIN — LITHIUM CARBONATE 600 MG: 300 TABLET, EXTENDED RELEASE ORAL at 20:27

## 2017-06-19 RX ADMIN — TRIFLUOPERAZINE HYDROCHLORIDE 4 MG: 2 TABLET, FILM COATED ORAL at 20:26

## 2017-06-19 RX ADMIN — PROPRANOLOL HYDROCHLORIDE 10 MG: 10 TABLET ORAL at 20:26

## 2017-06-19 RX ADMIN — PROPRANOLOL HYDROCHLORIDE 10 MG: 10 TABLET ORAL at 08:42

## 2017-06-19 RX ADMIN — BENZTROPINE MESYLATE 1 MG: 1 TABLET ORAL at 21:51

## 2017-06-19 RX ADMIN — NICOTINE POLACRILEX 4 MG: 2 GUM, CHEWING ORAL at 16:01

## 2017-06-19 RX ADMIN — ESZOPICLONE 3 MG: 3 TABLET, FILM COATED ORAL at 20:26

## 2017-06-19 RX ADMIN — PRAZOSIN HYDROCHLORIDE 2 MG: 2 CAPSULE ORAL at 20:26

## 2017-06-19 RX ADMIN — NICOTINE 1 PATCH: 21 PATCH, EXTENDED RELEASE TRANSDERMAL at 08:42

## 2017-06-19 ASSESSMENT — ACTIVITIES OF DAILY LIVING (ADL)
GROOMING: INDEPENDENT
GROOMING: INDEPENDENT
DRESS: STREET CLOTHES;INDEPENDENT
LAUNDRY: WITH SUPERVISION
ORAL_HYGIENE: INDEPENDENT
DRESS: STREET CLOTHES;INDEPENDENT
ORAL_HYGIENE: INDEPENDENT

## 2017-06-19 NOTE — PROGRESS NOTES
06/18/17 2231   Behavioral Health   Hallucinations denies / not responding to hallucinations   Thinking intact   Orientation person: oriented;place: oriented   Memory baseline memory   Insight poor   Judgement impaired   Eye Contact at examiner   Affect full range affect   Mood mood is calm   Physical Appearance/Attire appears stated age;attire appropriate to age and situation;neat   Hygiene well groomed   Suicidality other (see comments)  (Pt denies SI.)   Self Injury other (see comment)  (Pt denies SIB urges/thougths.)   Activity other (see comment)  (Moderately social with peers throughout the evening. )   Speech clear;coherent   Medication Sensitivity no stated side effects;no observed side effects   Psychomotor / Gait balanced;steady   Overt Aggression Scale   Verbal Aggression 0   Aggression against Property 0   Auto-Aggression 0   Physical Aggression 0   Overt Aggression Total Score 0   Sleep/Rest/Relaxation   Day/Evening Time Hours napping;resting in bed   Number of hours napping 1 hours   Number of hours resting in bed 1 hours   Coping/Psychosocial   Verbalized Emotional State acceptance;hopefulness   Plan Of Care Reviewed With patient   Patient Agreement with Plan of Care agrees   Supportive Measures active listening utilized;goal setting facilitated;relaxation techniques promoted;verbalization of feelings encouraged   Trust Relationship/Rapport care explained;choices provided;questions answered;questions encouraged;reassurance provided;thoughts/feelings acknowledged   Activities of Daily Living   Hygiene/Grooming handwashing;shower;independent   Oral Hygiene independent   Dress street clothes;independent   Laundry with supervision   Room Organization independent   Activity   Activity Level of Assistance independent   Groups   Details Community Meeting  (Attended and actively pariticpated in group activities. )

## 2017-06-19 NOTE — PROGRESS NOTES
06/19/17 1516   Behavioral Health   Hallucinations denies / not responding to hallucinations   Thinking intact   Orientation person: oriented;place: oriented   Insight admits / accepts;insight appropriate to situation;insight appropriate to events   Judgement intact   Affect full range affect   Mood mood is calm   Physical Appearance/Attire appears stated age;attire appropriate to age and situation;neat   Hygiene well groomed   Suicidality other (see comments)  (denied SI)   Self Injury other (see comment)  (denied SIB urges; o SIB's observed)   Activity other (see comment)  (visible, social, and engaged in programming)   Speech clear;coherent   Psychomotor / Gait balanced;steady   Sleep/Rest/Relaxation   Day/Evening Time Hours up all shift   Safety   Suicidality status 15   Coping/Psychosocial   Verbalized Emotional State acceptance   Psycho Education   Type of Intervention 1:1 intervention   Response participates with encouragement   Hours 0.5   Treatment Detail current MH status, IMR topic (triggers)   Activities of Daily Living   Hygiene/Grooming independent   Oral Hygiene independent   Dress street clothes;independent   Room Organization independent   Groups   Details Community Meeting, OT Clinic

## 2017-06-19 NOTE — PROGRESS NOTES
[facility], [place]   Psychiatric Progress Note         Interim History and Subjective Reports:   The patient's care was discussed with the treatment team during the daily team meeting.  Staff reports: the patient greatly doing better. No self harming behavior or SI. Mood lability resolved and more engaged and social with peers. Attending groups. No overt psychosis or haven, and noted that AH are distant. Compliant with medications and care. Sleeping and eating well. Independent with ADL.     The patient was sleeping when approached. She noted that she is feeling better. Noted that dep and anxiety resolving and denied hallucinations and racing thoughts. Frustrated about the lengthy hospital stay and believes that she is ready to transition to IRT. Noted that she is finding groups helpful. Sleep and appetite intact. Tolerating medications well. Future oriented and denied SI and urges to self harm.     Discussed medications and care plan.        Scheduled Medications:       nicotine   Transdermal Daily     nicotine  1 patch Transdermal Daily     nicotine   Transdermal Q8H     propranolol  10 mg Oral BID     eszopiclone  3 mg Oral At Bedtime     melatonin  9 mg Oral At Bedtime     trifluoperazine  4 mg Oral BID     trifluoperazine  2 mg Oral Daily     lithium  300 mg Oral QAM     lithium  600 mg Oral QPM     prazosin  2 mg Oral At Bedtime          Allergies:      Allergies   Allergen Reactions     Adhesive Tape Hives     Prednisone Other (See Comments) and Hives     Suicidal ideation     Droperidol Anxiety          Labs:     No results found for this or any previous visit (from the past 24 hour(s)).       Vitals:     Vitals:    06/17/17 0700 06/17/17 2011 06/18/17 2006 06/19/17 0900   BP:  116/72 146/81    BP Location: Left arm Left arm Left arm Left arm   Pulse:  101 92 (!) 18   Resp: 16      Temp: 97  F (36.1  C)   97  F (36.1  C)   TempSrc:       SpO2:       Weight:       Height:      "      Weight: 203 lbs 0 oz          Height: 5' 6\"           Body mass index is 32.77 kg/(m^2).        Mental Status Examination:   Appearance: awake, alert, appeared as age stated, well groomed and no apparent distress  Attitude:  cooperative  Eye Contact:  good  Mood:  better  Affect:  appropriate and in normal range and intensity is normal. Brighten and engaged.   Speech:  clear, coherent and normal prosody  Psychomotor Behavior:  no evidence of tardive dyskinesia, dystonia, or tics and intact station, gait and muscle tone.   Throught Process:  linear and goal oriented  Associations:  no loose associations  Thought Content:  no evidence of suicidal ideation or homicidal ideation and no evidence of psychotic thought   Insight:  good  Judgement:  intact  Oriented to:  time, person, and place  Attention Span and Concentration:  intact  Recent and Remote Memory:  intact         DIagnoses:     1. Schizoaffective disorder - bipolar type  2. PTSD  3. Suspect borderline personality disorder.   4. History of Marijuana use disorder.   5. History of opiate use disorder         Plan:   1. Biological treatments:  -- Lithium level reviewed; 0.8; continue at 300 mg qday and 600 mg qhs.   -- Stelazine: continued at 4 mg BID and 3 mg q2pm for reduction of psychosis  -- Lunesta and Melatonin for insomnia.   --Tolerating propranolol well to reduce anxiety and restlessness  -- Prazosin: continued at 2 mg qhs.   -- PRN Ativan for anxiety. Advised to limit.     2. Legal Status and Dispo:  -- under full commitment.   -- IRTS referral; awaiting acceptance. Interview on Monday with staff from [facility] and possible opening at [facility] later in the week.   --  We will attempt to transition her care out of the hospital and with family or at a crisis facility as she awaits the next opening for residential treatment           "

## 2017-06-20 PROCEDURE — 25000132 ZZH RX MED GY IP 250 OP 250 PS 637: Performed by: PSYCHIATRY & NEUROLOGY

## 2017-06-20 PROCEDURE — 99232 SBSQ HOSP IP/OBS MODERATE 35: CPT | Performed by: PSYCHIATRY & NEUROLOGY

## 2017-06-20 PROCEDURE — 12400001 ZZH R&B MH UMMC

## 2017-06-20 RX ORDER — RISPERIDONE 0.25 MG/1
0.5 TABLET ORAL DAILY
Status: DISCONTINUED | OUTPATIENT
Start: 2017-06-20 | End: 2017-06-27 | Stop reason: HOSPADM

## 2017-06-20 RX ORDER — RISPERIDONE 1 MG/1
1 TABLET ORAL AT BEDTIME
Status: DISCONTINUED | OUTPATIENT
Start: 2017-06-20 | End: 2017-06-27 | Stop reason: HOSPADM

## 2017-06-20 RX ORDER — RISPERIDONE 0.25 MG/1
0.25 TABLET ORAL DAILY
Status: DISCONTINUED | OUTPATIENT
Start: 2017-06-21 | End: 2017-06-27 | Stop reason: HOSPADM

## 2017-06-20 RX ADMIN — TRIFLUOPERAZINE HYDROCHLORIDE 4 MG: 2 TABLET, FILM COATED ORAL at 11:24

## 2017-06-20 RX ADMIN — PROPRANOLOL HYDROCHLORIDE 10 MG: 10 TABLET ORAL at 11:23

## 2017-06-20 RX ADMIN — Medication 9 MG: at 20:12

## 2017-06-20 RX ADMIN — LITHIUM CARBONATE 600 MG: 300 TABLET, EXTENDED RELEASE ORAL at 20:10

## 2017-06-20 RX ADMIN — ESZOPICLONE 3 MG: 3 TABLET, FILM COATED ORAL at 20:09

## 2017-06-20 RX ADMIN — RISPERIDONE 0.5 MG: 0.25 TABLET ORAL at 13:51

## 2017-06-20 RX ADMIN — HYDROXYZINE HYDROCHLORIDE 50 MG: 25 TABLET ORAL at 16:39

## 2017-06-20 RX ADMIN — NICOTINE POLACRILEX 4 MG: 2 GUM, CHEWING ORAL at 19:35

## 2017-06-20 RX ADMIN — NICOTINE 1 PATCH: 21 PATCH, EXTENDED RELEASE TRANSDERMAL at 11:24

## 2017-06-20 RX ADMIN — PRAZOSIN HYDROCHLORIDE 2 MG: 2 CAPSULE ORAL at 20:10

## 2017-06-20 RX ADMIN — PROPRANOLOL HYDROCHLORIDE 10 MG: 10 TABLET ORAL at 20:09

## 2017-06-20 RX ADMIN — LITHIUM CARBONATE 300 MG: 300 TABLET, EXTENDED RELEASE ORAL at 11:23

## 2017-06-20 RX ADMIN — RISPERIDONE 1 MG: 1 TABLET ORAL at 20:09

## 2017-06-20 ASSESSMENT — ACTIVITIES OF DAILY LIVING (ADL)
LAUNDRY: WITH SUPERVISION
ORAL_HYGIENE: INDEPENDENT
GROOMING: INDEPENDENT
DRESS: INDEPENDENT
DRESS: INDEPENDENT
ORAL_HYGIENE: INDEPENDENT
GROOMING: INDEPENDENT

## 2017-06-20 NOTE — PLAN OF CARE
Problem: Manic Symptoms  Goal: Manic Symptoms  Signs and symptoms of listed problems will be absent or manageable.   Patient will report less lability of mood  Patient will maintain appropriate boundaries with peers and staff  Patient speech will be notable for appropriate content, rate, and volume  Patient will report less difficulty falling and staying asleep   Outcome: Improving  Ayana slept late until late morning. Once awake she remained mostly out of her room. Bright affect, joking with staff, calm, cooperative. She had an interview with a group home which she said went well, but she was frustrated with the fact that even though the group home said that she would be a good fit, it might be until mid July until they had an opening. She handled the news moderately well.      Intake is adequate, hygiene appropriate. She stated that she would be showering this evening.

## 2017-06-20 NOTE — PLAN OF CARE
"Problem: Manic Symptoms  Intervention: Social and Therapeutic Interv (Manic Symptoms)     Occupational Therapy:  Pt has attended 5.0 OT groups in the past week.  Quiet but pleasant and cooperative, at times could be drawn into conversation.  Commented that she is eager to \"just get some fresh air\" as a high point in being able to discharge from hospital.  Pt has also expressed hope about a placement interview.        "

## 2017-06-20 NOTE — PROGRESS NOTES
[facility], [place]   Psychiatric Progress Note         Interim History and Subjective Reports:   The patient's care was discussed with the treatment team during the daily team meeting.  Staff reports: the patient greatly doing better. Sleep in AM but greatly more visible and engaged in the afternoons and evenings. Attending  groups and engaged on approach. No self harming behavior or SI. Affect is calm and social with peers. No overt psychosis or haven.  Compliant with medications and care. Sleeping and eating well. Independent with ADL.       The patient was pleasant and fully engaged. C/o mild headache but tolerating medications well. No SI or SHELLEY. Noted some AH yesterday due to anxiety but none since. No paranoia or racing thoughts. Sleep and appetite are intact.  Frustrated about the lengthy hospital stay and believes that she is ready to transition to IRT.     Discussed medications and care plan. Agreed to switch from Stelazine to Risperdal due to insurance coverage after I reviewed medication profiles.        Scheduled Medications:       risperiDONE  0.5 mg Oral Daily     risperiDONE  1 mg Oral At Bedtime     [START ON 6/21/2017] risperiDONE  0.25 mg Oral Daily     nicotine   Transdermal Daily     nicotine  1 patch Transdermal Daily     nicotine   Transdermal Q8H     propranolol  10 mg Oral BID     eszopiclone  3 mg Oral At Bedtime     melatonin  9 mg Oral At Bedtime     lithium  300 mg Oral QAM     lithium  600 mg Oral QPM     prazosin  2 mg Oral At Bedtime          Allergies:      Allergies   Allergen Reactions     Adhesive Tape Hives     Prednisone Other (See Comments) and Hives     Suicidal ideation     Droperidol Anxiety          Labs:     No results found for this or any previous visit (from the past 24 hour(s)).       Vitals:     Vitals:    06/18/17 2006 06/19/17 0900 06/19/17 2024 06/20/17 1121   BP: 146/81  127/76 129/78   BP Location: Left arm Left arm     Pulse: 92 (!)  "18 87 90   Resp:       Temp:  97  F (36.1  C)  97.7  F (36.5  C)   TempSrc:    Oral   SpO2:       Weight:    92.5 kg (204 lb)   Height:           Weight: 204 lbs 0 oz          Height: 5' 6\"           Body mass index is 32.93 kg/(m^2).        Mental Status Examination:   Appearance: awake, alert, appeared as age stated, well groomed and no apparent distress  Attitude:  cooperative  Eye Contact:  good  Mood:  anxious and better  Affect:  appropriate and in normal range and intensity is normal. Brighten and engaged.   Speech:  clear, coherent and normal prosody  Psychomotor Behavior:  no evidence of tardive dyskinesia, dystonia, or tics and intact station, gait and muscle tone.   Throught Process:  linear and goal oriented  Associations:  no loose associations  Thought Content:  no evidence of suicidal ideation or homicidal ideation and no evidence of psychotic thought   Insight:  good  Judgement:  intact  Oriented to:  time, person, and place  Attention Span and Concentration:  intact  Recent and Remote Memory:  intact         DIagnoses:     1. Schizoaffective disorder - bipolar type  2. PTSD  3. Suspect borderline personality disorder.   4. History of Marijuana use disorder.   5. History of opiate use disorder         Plan:   1. Biological treatments:  -- Lithium level reviewed; 0.8; continue at 300 mg qday and 600 mg qhs.   -- Stelazine: started and titrated but later cross tapered to Risperdal due to insurance coverage.   -- Risperdal: started t 0.5 mg qam, 0.25 q2pm and 1 mg qhs.   -- Lunesta and Melatonin for insomnia.   --Tolerating propranolol well to reduce anxiety and restlessness  -- Prazosin: continued at 2 mg qhs.   -- PRN Ativan for anxiety. Advised to limit.     2. Legal Status and Dispo:  -- under full commitment.   -- IRTS referral; awaiting acceptance. Interview on Monday with staff from [facility] and possible opening at [facility] later in the week.   --  We will attempt to transition her care out of " the hospital and with family or at a crisis facility as she awaits the next opening for residential treatment

## 2017-06-20 NOTE — PROGRESS NOTES
06/19/17 2100   Behavioral Health   Hallucinations denies / not responding to hallucinations   Thinking intact   Orientation person: oriented;place: oriented;date: oriented;time: oriented   Memory baseline memory   Insight admits / accepts   Judgement intact   Eye Contact at examiner   Affect full range affect   Mood mood is calm   Physical Appearance/Attire appears stated age;attire appropriate to age and situation   Hygiene well groomed   Suicidality other (see comments)  (denies)   Self Injury other (see comment)  (denies)   Activity isolative   Speech clear;coherent   Medication Sensitivity no stated side effects;no observed side effects   Psychomotor / Gait balanced;steady   Activities of Daily Living   Hygiene/Grooming independent   Oral Hygiene independent   Dress street clothes;independent   Laundry with supervision   Room Organization independent     Pt was isolative this evening and did not attend group, pt stated that she was frustrated that her interview got pushed back until tomorrow but her goal was to stay positive until then.

## 2017-06-20 NOTE — PROGRESS NOTES
"I spoke at length with [facility]  Kel yesterday to advocate for pt's admission. He needed to run the new info by the clinical person at the [facility] site. I had hoped he would call today with an offer of admission and he has not done so.  Pt met with Will from [facility] at 1PM today. Pt said that Will said that he needs to run it by his supervisor but she looks like a good fit. Will told Ayana that the opening would be mid-July. This of course disheartened the pt.  Pt and I called the TCM Valdo and got her on speaker phone. We gave her the update. We asked if there could be a transition plan or did she have to stay here. Valdo said she had to stay here. Valdo said she still hopes that Teofilo would call. Pt said to Valdo that she told her that she could go to her parents if there were no options so she said have \"threwn\" the interview. Pt made remarks about being here for her birthday and I checked and challenged her that this was an  [birth year] birthday. We talked about doing some relaxation exercises and looking at her cognitive distortions.  Valdo and I did tell the pt that we think it more likely will be sooner.  I said that I would begin to look for a psychiatric provider.  Pt said she thinks she would go back to her therapist too.  I tried to find a psychiatric providers in the [place] [facility] or [facility] [place].  I called [facility] and they have a wait list.  I called [facility] and they have to see a therapist first.  I called [facility] and the [place] office had the earliest appt.  I added this to the AVS;  Attend your new patient psychiatric medication management appointment. Friday, September 15, 2017 from 9-11AM.  [facility]  [email or web address]  [address]  [place], [place]  Phone: [phone]  The Health Unit Coordinator has faxed these discharge instructions to [phone]      I have asked the RN and HUC to have the " meds sent back to the pharmacy that were ordered on June 8 or so when it was thought that the pt was going to be discharged. The meds were given back to pharmacy this afternoon ehen they delivered some other meds.

## 2017-06-21 PROCEDURE — 25000132 ZZH RX MED GY IP 250 OP 250 PS 637: Performed by: PSYCHIATRY & NEUROLOGY

## 2017-06-21 PROCEDURE — 90853 GROUP PSYCHOTHERAPY: CPT

## 2017-06-21 PROCEDURE — 97150 GROUP THERAPEUTIC PROCEDURES: CPT | Mod: GO

## 2017-06-21 PROCEDURE — 99232 SBSQ HOSP IP/OBS MODERATE 35: CPT | Performed by: PSYCHIATRY & NEUROLOGY

## 2017-06-21 PROCEDURE — 12400001 ZZH R&B MH UMMC

## 2017-06-21 RX ORDER — GABAPENTIN 100 MG/1
100 CAPSULE ORAL 3 TIMES DAILY
Status: DISCONTINUED | OUTPATIENT
Start: 2017-06-21 | End: 2017-06-27 | Stop reason: HOSPADM

## 2017-06-21 RX ADMIN — Medication 9 MG: at 20:25

## 2017-06-21 RX ADMIN — GABAPENTIN 100 MG: 100 CAPSULE ORAL at 15:39

## 2017-06-21 RX ADMIN — OLANZAPINE 10 MG: 10 TABLET, ORALLY DISINTEGRATING ORAL at 22:16

## 2017-06-21 RX ADMIN — PROPRANOLOL HYDROCHLORIDE 10 MG: 10 TABLET ORAL at 08:37

## 2017-06-21 RX ADMIN — NICOTINE 1 PATCH: 21 PATCH, EXTENDED RELEASE TRANSDERMAL at 08:37

## 2017-06-21 RX ADMIN — NICOTINE POLACRILEX 4 MG: 2 GUM, CHEWING ORAL at 12:53

## 2017-06-21 RX ADMIN — GABAPENTIN 100 MG: 100 CAPSULE ORAL at 20:26

## 2017-06-21 RX ADMIN — PROPRANOLOL HYDROCHLORIDE 10 MG: 10 TABLET ORAL at 20:26

## 2017-06-21 RX ADMIN — RISPERIDONE 0.5 MG: 0.25 TABLET ORAL at 08:37

## 2017-06-21 RX ADMIN — NICOTINE POLACRILEX 2 MG: 2 GUM, CHEWING ORAL at 15:39

## 2017-06-21 RX ADMIN — PRAZOSIN HYDROCHLORIDE 2 MG: 2 CAPSULE ORAL at 20:25

## 2017-06-21 RX ADMIN — ESZOPICLONE 3 MG: 3 TABLET, FILM COATED ORAL at 20:26

## 2017-06-21 RX ADMIN — BENZTROPINE MESYLATE 1 MG: 1 TABLET ORAL at 22:16

## 2017-06-21 RX ADMIN — LITHIUM CARBONATE 600 MG: 300 TABLET, EXTENDED RELEASE ORAL at 20:26

## 2017-06-21 RX ADMIN — LITHIUM CARBONATE 300 MG: 300 TABLET, EXTENDED RELEASE ORAL at 08:37

## 2017-06-21 RX ADMIN — RISPERIDONE 1 MG: 1 TABLET ORAL at 20:26

## 2017-06-21 RX ADMIN — LORAZEPAM 0.5 MG: 0.5 TABLET ORAL at 17:56

## 2017-06-21 RX ADMIN — LORAZEPAM 0.5 MG: 0.5 TABLET ORAL at 22:16

## 2017-06-21 RX ADMIN — RISPERIDONE 0.25 MG: 0.25 TABLET ORAL at 15:39

## 2017-06-21 RX ADMIN — IBUPROFEN 400 MG: 200 TABLET, FILM COATED ORAL at 19:34

## 2017-06-21 ASSESSMENT — ACTIVITIES OF DAILY LIVING (ADL)
GROOMING: INDEPENDENT
ORAL_HYGIENE: INDEPENDENT
LAUNDRY: UNABLE TO COMPLETE
LAUNDRY: WITH SUPERVISION
DRESS: INDEPENDENT
ORAL_HYGIENE: INDEPENDENT
GROOMING: INDEPENDENT
DRESS: STREET CLOTHES

## 2017-06-21 NOTE — PROGRESS NOTES
Behavioral Health  Note   Behavioral Health  Spirituality Group Note     Unit [address]    Name: Ayana Prasad    YOB: 1990   MRN: [id number]    Age: 26 year old     Patient attended -led group, which included discussion of spirituality, coping with illness and building resilience.   Patient attended group for 1.0 hrs.   The patient actively participated in group discussion     [facility]  Staff    Page [phone]

## 2017-06-21 NOTE — PROGRESS NOTES
[facility], [place]   Psychiatric Progress Note         Interim History and Subjective Reports:   The patient's care was discussed with the treatment team during the daily team meeting.  Staff reports: the patient well. Engaged, brighten, cooperative and pleasant. No SI or SHELLEY. Rated anxiety at 8 out of 10 last evening doing better. Frustrated with the length of hospital stay.  Attending  groups and engaged on approach. No self harming behavior or outbursts.   No overt psychosis or haven.  Compliant with medications and care. Sleeping and eating well. Independent with ADL.     The patient noted that she is feeling more optimistic and feels more hopeful but frustrated with length of stay. Denied SI, SHELLEY and HI. Denied dep and racing thoughts. Anxiety fluctuates and feels restless at times. Agreed to start on Gabapentin after I reviewed medication profile. No hallucinations, paranoia or delusional thoughts. Sleep and appetite intact. No nightmare. Tolerating medications well.      Discussed medications and care plan.        Scheduled Medications:       gabapentin  100 mg Oral TID     risperiDONE  0.5 mg Oral Daily     risperiDONE  1 mg Oral At Bedtime     risperiDONE  0.25 mg Oral Daily     nicotine   Transdermal Daily     nicotine  1 patch Transdermal Daily     nicotine   Transdermal Q8H     propranolol  10 mg Oral BID     eszopiclone  3 mg Oral At Bedtime     melatonin  9 mg Oral At Bedtime     lithium  300 mg Oral QAM     lithium  600 mg Oral QPM     prazosin  2 mg Oral At Bedtime          Allergies:      Allergies   Allergen Reactions     Adhesive Tape Hives     Prednisone Other (See Comments) and Hives     Suicidal ideation     Droperidol Anxiety          Labs:     No results found for this or any previous visit (from the past 24 hour(s)).       Vitals:     Vitals:    06/20/17 1121 06/20/17 2009 06/20/17 2010 06/21/17 0808   BP: 129/78 125/73 125/73    BP Location:       Pulse: 90 97  "104    Resp:    16   Temp: 97.7  F (36.5  C)   97.5  F (36.4  C)   TempSrc: Oral   Oral   SpO2:       Weight: 92.5 kg (204 lb)      Height:           Weight: 204 lbs 0 oz          Height: 5' 6\"           Body mass index is 32.93 kg/(m^2).        Mental Status Examination:   Appearance: awake, alert, appeared as age stated, well groomed and no apparent distress  Attitude:  cooperative  Eye Contact:  good  Mood:  anxious  Affect:  appropriate and in normal range and intensity is normal. Brighten and engaged.   Speech:  clear, coherent and normal prosody  Psychomotor Behavior:  no evidence of tardive dyskinesia, dystonia, or tics and intact station, gait and muscle tone.   Throught Process:  linear and goal oriented  Associations:  no loose associations  Thought Content:  no evidence of suicidal ideation or homicidal ideation and no evidence of psychotic thought   Insight:  good  Judgement:  intact  Oriented to:  time, person, and place  Attention Span and Concentration:  intact  Recent and Remote Memory:  intact         DIagnoses:     1. Schizoaffective disorder - bipolar type  2. PTSD  3. Suspect borderline personality disorder.   4. History of Marijuana use disorder.   5. History of opiate use disorder         Plan:   1. Biological treatments:  -- Lithium level reviewed; 0.8; continue at 300 mg qday and 600 mg qhs.   -- Stelazine: started and titrated but later cross tapered to Risperdal due to insurance coverage.   -- Risperdal: started t 0.5 mg qam, 0.25 q2pm and 1 mg qhs.   -- Lunesta and Melatonin for insomnia.   --Tolerating propranolol well to reduce anxiety and restlessness  -- Prazosin: continued at 2 mg qhs.   -- PRN Ativan for anxiety. Advised to limit.   -- Gabapentin: added at 100 mg TID for anxiety.     2. Legal Status and Dispo:  -- under full commitment.   -- IRTS referral; awaiting acceptance. Interview on Monday with staff from [facility] and possible opening at [facility] later in the week.   --  We " will attempt to transition her care out of the hospital and with family or at a crisis facility as she awaits the next opening for residential treatment. The patient court assign CCM is hesitant about placement at crisis units.

## 2017-06-21 NOTE — PLAN OF CARE
Problem: Manic Symptoms  Goal: Social and Therapeutic (Manic Symptoms)  Signs and symptoms of listed problems will be absent or manageable.         Pt. Actively participated in goal directed task session. Pt.was able to ask of assistance as needed and independently initiate work on task.  Pt. demonstrated good focus on task and worked at a normal pace.  Pt. worked in an organized manner.  Pt. was social with peers and appeared to enjoy the group interaction.  Pleasant and cooperative throughout session. Pt talked with a peer about their experience de-toxing.

## 2017-06-22 PROCEDURE — 25000132 ZZH RX MED GY IP 250 OP 250 PS 637: Performed by: PSYCHIATRY & NEUROLOGY

## 2017-06-22 PROCEDURE — 97150 GROUP THERAPEUTIC PROCEDURES: CPT | Mod: GO

## 2017-06-22 PROCEDURE — 90853 GROUP PSYCHOTHERAPY: CPT

## 2017-06-22 PROCEDURE — 12400001 ZZH R&B MH UMMC

## 2017-06-22 RX ADMIN — IBUPROFEN 400 MG: 200 TABLET, FILM COATED ORAL at 18:44

## 2017-06-22 RX ADMIN — PRAZOSIN HYDROCHLORIDE 2 MG: 2 CAPSULE ORAL at 20:20

## 2017-06-22 RX ADMIN — ESZOPICLONE 3 MG: 3 TABLET, FILM COATED ORAL at 20:16

## 2017-06-22 RX ADMIN — NICOTINE POLACRILEX 4 MG: 2 GUM, CHEWING ORAL at 20:20

## 2017-06-22 RX ADMIN — NICOTINE POLACRILEX 4 MG: 2 GUM, CHEWING ORAL at 17:21

## 2017-06-22 RX ADMIN — LITHIUM CARBONATE 600 MG: 300 TABLET, EXTENDED RELEASE ORAL at 20:16

## 2017-06-22 RX ADMIN — Medication 9 MG: at 20:18

## 2017-06-22 RX ADMIN — GABAPENTIN 100 MG: 100 CAPSULE ORAL at 20:17

## 2017-06-22 RX ADMIN — PROPRANOLOL HYDROCHLORIDE 10 MG: 10 TABLET ORAL at 20:19

## 2017-06-22 RX ADMIN — LITHIUM CARBONATE 300 MG: 300 TABLET, EXTENDED RELEASE ORAL at 13:05

## 2017-06-22 RX ADMIN — GABAPENTIN 100 MG: 100 CAPSULE ORAL at 13:05

## 2017-06-22 RX ADMIN — NICOTINE 1 PATCH: 21 PATCH, EXTENDED RELEASE TRANSDERMAL at 13:04

## 2017-06-22 RX ADMIN — RISPERIDONE 0.25 MG: 0.25 TABLET ORAL at 13:05

## 2017-06-22 RX ADMIN — RISPERIDONE 1 MG: 1 TABLET ORAL at 20:17

## 2017-06-22 ASSESSMENT — ACTIVITIES OF DAILY LIVING (ADL)
ORAL_HYGIENE: INDEPENDENT
ORAL_HYGIENE: INDEPENDENT
GROOMING: INDEPENDENT
LAUNDRY: WITH SUPERVISION
DRESS: INDEPENDENT
DRESS: INDEPENDENT
GROOMING: INDEPENDENT

## 2017-06-22 NOTE — PLAN OF CARE
Problem: Manic Symptoms  Goal: Manic Symptoms  Signs and symptoms of listed problems will be absent or manageable.   Patient will report less lability of mood  Patient will maintain appropriate boundaries with peers and staff  Patient speech will be notable for appropriate content, rate, and volume  Patient will report less difficulty falling and staying asleep   Outcome: Improving  Pt was in bed all morning until lunch time. Some a.m meds were given and some were held. Pt denies all psych sx. Pt is otherwise pleasant and cooperative. Pt is attending afternoon groups. Will continue to monitor.

## 2017-06-22 NOTE — PROGRESS NOTES
DATA:   Patient participated in mental health group facilitated by emmy BORDEN, Christie Rodriguez.  Patient engaged in the written activity as well as the discussion portion of the group.  Patient was able to identify personal values and how her grandmother impacted her values.  She needed facilitator support with talking through the connections, however was able to come up with her own summary.   ASSESSMENT:   Patient sat at the table away from the majority of her peers, however sat at the table with an elder peer and helped keep that peer engaged and on track.  Patient needed many cues to keep her engaged, however asked writer to repeat directions or stated that she did not understand.  She was a great sport and allowed facilitator to use her work as an example.  She was honest and stated that she was just provider facilitator with answers as she was called on, however her answers/responses were truthful and a reflection of self. Whenever discussing her grandmother, she was sure of herself and showed great respect for her grandmother.  Patient was open to accepting feedback and support from facilitator and peers.  She also provided support and feedback to her peers.   PLAN:   Continue to encourage his/her participation in offered groups during hospital stay.    DESIRE Vargas, [facility]  Clinical Treatment Coordinator          06/22/17 9827   Psycho Education   Type of Intervention structured groups  (Mental Health Group)   Response participates with encouragement   Hours 1   Treatment Detail Personal Values

## 2017-06-23 PROCEDURE — 12400001 ZZH R&B MH UMMC

## 2017-06-23 PROCEDURE — 25000132 ZZH RX MED GY IP 250 OP 250 PS 637: Performed by: PSYCHIATRY & NEUROLOGY

## 2017-06-23 PROCEDURE — 90853 GROUP PSYCHOTHERAPY: CPT

## 2017-06-23 PROCEDURE — 99232 SBSQ HOSP IP/OBS MODERATE 35: CPT | Performed by: PSYCHIATRY & NEUROLOGY

## 2017-06-23 PROCEDURE — 97150 GROUP THERAPEUTIC PROCEDURES: CPT | Mod: GO

## 2017-06-23 PROCEDURE — 25000125 ZZHC RX 250: Performed by: PSYCHIATRY & NEUROLOGY

## 2017-06-23 PROCEDURE — H2032 ACTIVITY THERAPY, PER 15 MIN: HCPCS

## 2017-06-23 RX ADMIN — NICOTINE POLACRILEX 2 MG: 2 GUM, CHEWING ORAL at 08:00

## 2017-06-23 RX ADMIN — ESZOPICLONE 3 MG: 3 TABLET, FILM COATED ORAL at 20:15

## 2017-06-23 RX ADMIN — NICOTINE 1 PATCH: 21 PATCH, EXTENDED RELEASE TRANSDERMAL at 08:00

## 2017-06-23 RX ADMIN — PROPRANOLOL HYDROCHLORIDE 10 MG: 10 TABLET ORAL at 08:01

## 2017-06-23 RX ADMIN — RISPERIDONE 0.5 MG: 0.25 TABLET ORAL at 08:01

## 2017-06-23 RX ADMIN — NICOTINE POLACRILEX 4 MG: 2 GUM, CHEWING ORAL at 11:34

## 2017-06-23 RX ADMIN — NICOTINE POLACRILEX 2 MG: 2 GUM, CHEWING ORAL at 09:21

## 2017-06-23 RX ADMIN — RISPERIDONE 1 MG: 1 TABLET ORAL at 21:09

## 2017-06-23 RX ADMIN — RISPERIDONE 0.25 MG: 0.25 TABLET ORAL at 13:54

## 2017-06-23 RX ADMIN — PROPRANOLOL HYDROCHLORIDE 10 MG: 10 TABLET ORAL at 21:09

## 2017-06-23 RX ADMIN — PRAZOSIN HYDROCHLORIDE 2 MG: 2 CAPSULE ORAL at 21:08

## 2017-06-23 RX ADMIN — GABAPENTIN 100 MG: 100 CAPSULE ORAL at 08:01

## 2017-06-23 RX ADMIN — LITHIUM CARBONATE 300 MG: 300 TABLET, EXTENDED RELEASE ORAL at 08:01

## 2017-06-23 RX ADMIN — GABAPENTIN 100 MG: 100 CAPSULE ORAL at 13:54

## 2017-06-23 RX ADMIN — LITHIUM CARBONATE 600 MG: 300 TABLET, EXTENDED RELEASE ORAL at 20:15

## 2017-06-23 RX ADMIN — LORAZEPAM 0.5 MG: 0.5 TABLET ORAL at 22:54

## 2017-06-23 RX ADMIN — GABAPENTIN 100 MG: 100 CAPSULE ORAL at 20:15

## 2017-06-23 RX ADMIN — NICOTINE POLACRILEX 2 MG: 2 GUM, CHEWING ORAL at 13:54

## 2017-06-23 RX ADMIN — ONDANSETRON 4 MG: 4 TABLET, ORALLY DISINTEGRATING ORAL at 18:39

## 2017-06-23 ASSESSMENT — ACTIVITIES OF DAILY LIVING (ADL)
ORAL_HYGIENE: INDEPENDENT
DRESS: STREET CLOTHES;INDEPENDENT
GROOMING: HANDWASHING;SHOWER;INDEPENDENT
GROOMING: INDEPENDENT
DRESS: INDEPENDENT
LAUNDRY: WITH SUPERVISION
ORAL_HYGIENE: INDEPENDENT
LAUNDRY: WITH SUPERVISION

## 2017-06-23 NOTE — PLAN OF CARE
Problem: General Plan of Care (Inpatient Behavioral)  Goal: Individualization/Patient Specific Goal (IP Behavioral)  The patient and/or their representative will achieve their patient-specific goals related to the plan of care.    The patient-specific goals include:  Illness Management Recovery model: Objectives    Patient will identify reason(s) for hospitalization from their perspective.  Patient will identify a minimum of three goals for discharge.  Patient will identify a minimum of three triggers that may increase their symptoms.  Patient will identify a minimum of three coping skills they can do to stay well.   Patient will identify their support system to demonstrate readiness for discharge.   Outcome: Therapy, progress towards functional goals is fair  Illness Management Recovery model:  Taking Action.     Patient identified the following actions they can take when early warning signs are present in order to prevent relapse:     1. Work on diary project mom will bring in this week end  2. Work on staying positive while here  3.

## 2017-06-23 NOTE — PROGRESS NOTES
Patient requested to meet with [facility], wondering if we had heard anything regarding placement.  She was informed there is no new information at this point in time.  Will continue to update patient as new information arrives from referred placements.

## 2017-06-23 NOTE — PROGRESS NOTES
"[facility], [place]   Psychiatric Progress Note         Interim History and Subjective Reports:   The patient's care was discussed with the treatment team during the daily team meeting.  Staff reports: the patient well. Engaged, social and attending groups. Cooperative and pleasant. Compliant with medications and attending groups. No SI or SHELLEY. Rated anxiety and depression low. No self harming behavior or outbursts.   No overt psychosis or haven.  Sleeping and eating well. Independent with ADL.     The patient noted that she is doing we. Denied dep and anx. Future oriented and denied SI and SHELLEY. Believes that she is ready to discharge to [facility], frustrated with length of hospital stay. Sleeping well. No nightmares. Aappetite intact. Denied hallucinations, \"no, nothing recently\". No paranoia or racing thoughts. Tolerating medications well. No physical complaints.     Discussed medications and care plan.        Scheduled Medications:       gabapentin  100 mg Oral TID     risperiDONE  0.5 mg Oral Daily     risperiDONE  1 mg Oral At Bedtime     risperiDONE  0.25 mg Oral Daily     nicotine   Transdermal Daily     nicotine  1 patch Transdermal Daily     nicotine   Transdermal Q8H     propranolol  10 mg Oral BID     eszopiclone  3 mg Oral At Bedtime     melatonin  9 mg Oral At Bedtime     lithium  300 mg Oral QAM     lithium  600 mg Oral QPM     prazosin  2 mg Oral At Bedtime          Allergies:      Allergies   Allergen Reactions     Adhesive Tape Hives     Prednisone Other (See Comments) and Hives     Suicidal ideation     Droperidol Anxiety          Labs:     No results found for this or any previous visit (from the past 24 hour(s)).       Vitals:     Vitals:    06/21/17 0808 06/21/17 1900 06/22/17 2000 06/23/17 0700   BP:  134/88 124/81    BP Location:    Left arm   Pulse:  104 108    Resp: 16   16   Temp: 97.5  F (36.4  C)   97.2  F (36.2  C)   TempSrc: Oral      SpO2:       Weight:     " "  Height:           Weight: 204 lbs 0 oz          Height: 5' 6\"           Body mass index is 32.93 kg/(m^2).        Mental Status Examination:   Appearance: awake, alert, appeared as age stated, well groomed and no apparent distress  Attitude:  cooperative  Eye Contact:  good  Mood:  better  Affect:  appropriate and in normal range and intensity is normal. Brighten and engaged.   Speech:  clear, coherent and normal prosody  Psychomotor Behavior:  no evidence of tardive dyskinesia, dystonia, or tics and intact station, gait and muscle tone.   Throught Process:  linear and goal oriented  Associations:  no loose associations  Thought Content:  no evidence of suicidal ideation or homicidal ideation and no evidence of psychotic thought   Insight:  good  Judgement:  intact  Oriented to:  time, person, and place  Attention Span and Concentration:  intact  Recent and Remote Memory:  intact         DIagnoses:     1. Schizoaffective disorder - bipolar type  2. PTSD  3. Suspect borderline personality disorder.   4. History of Marijuana use disorder.   5. History of opiate use disorder         Plan:   1. Biological treatments:  -- Lithium level reviewed; 0.8; continue at 300 mg qday and 600 mg qhs.   -- Stelazine: started and titrated but later cross tapered to Risperdal due to insurance coverage.   -- Risperdal: started t 0.5 mg qam, 0.25 q2pm and 1 mg qhs.   -- Lunesta and Melatonin for insomnia.   --Tolerating propranolol well to reduce anxiety and restlessness  -- Prazosin: continued at 2 mg qhs.   -- PRN Ativan for anxiety. Advised to limit.   -- Gabapentin: added and continued at 100 mg TID for anxiety.     2. Legal Status and Dispo:  -- under full commitment.   -- IRTS referral; awaiting acceptance. Interview on Monday with staff from [facility] and possible opening at [facility] later in the week.   --  We will attempt to transition her care out of the hospital and with family or at a crisis facility as she awaits the " next opening for residential treatment. The patient court assign [facility] is hesitant about placement at crisis units.

## 2017-06-23 NOTE — PLAN OF CARE
Problem: Manic Symptoms  Goal: Manic Symptoms  Signs and symptoms of listed problems will be absent or manageable.   Patient will report less lability of mood  Patient will maintain appropriate boundaries with peers and staff  Patient speech will be notable for appropriate content, rate, and volume  Patient will report less difficulty falling and staying asleep   Outcome: Therapy, progress towards functional goals is fair  Pt out in common rey most of shift. Pt was social with peers and staff.  Pleasant on approach, responding to humor. Pt denies suicidal thoughts. Pt attended select groups. Pt's goal for weekend is to try to stay positive.

## 2017-06-24 PROCEDURE — 12400001 ZZH R&B MH UMMC

## 2017-06-24 PROCEDURE — 25000132 ZZH RX MED GY IP 250 OP 250 PS 637: Performed by: PSYCHIATRY & NEUROLOGY

## 2017-06-24 RX ADMIN — Medication 9 MG: at 20:45

## 2017-06-24 RX ADMIN — LITHIUM CARBONATE 600 MG: 300 TABLET, EXTENDED RELEASE ORAL at 20:44

## 2017-06-24 RX ADMIN — GABAPENTIN 100 MG: 100 CAPSULE ORAL at 20:45

## 2017-06-24 RX ADMIN — ESZOPICLONE 3 MG: 3 TABLET, FILM COATED ORAL at 20:44

## 2017-06-24 RX ADMIN — PRAZOSIN HYDROCHLORIDE 2 MG: 2 CAPSULE ORAL at 20:45

## 2017-06-24 RX ADMIN — OLANZAPINE 10 MG: 10 TABLET, ORALLY DISINTEGRATING ORAL at 15:03

## 2017-06-24 RX ADMIN — Medication 1 LOZENGE: at 14:04

## 2017-06-24 RX ADMIN — PROPRANOLOL HYDROCHLORIDE 10 MG: 10 TABLET ORAL at 20:47

## 2017-06-24 RX ADMIN — GABAPENTIN 100 MG: 100 CAPSULE ORAL at 14:04

## 2017-06-24 RX ADMIN — RISPERIDONE 0.5 MG: 0.25 TABLET ORAL at 09:36

## 2017-06-24 RX ADMIN — GABAPENTIN 100 MG: 100 CAPSULE ORAL at 09:34

## 2017-06-24 RX ADMIN — RISPERIDONE 1 MG: 1 TABLET ORAL at 20:48

## 2017-06-24 RX ADMIN — RISPERIDONE 0.25 MG: 0.25 TABLET ORAL at 14:04

## 2017-06-24 RX ADMIN — NICOTINE 1 PATCH: 21 PATCH, EXTENDED RELEASE TRANSDERMAL at 09:35

## 2017-06-24 RX ADMIN — LITHIUM CARBONATE 300 MG: 300 TABLET, EXTENDED RELEASE ORAL at 09:34

## 2017-06-24 RX ADMIN — PROPRANOLOL HYDROCHLORIDE 10 MG: 10 TABLET ORAL at 09:34

## 2017-06-24 NOTE — PLAN OF CARE
Problem: Manic Symptoms  Goal: Manic Symptoms  Signs and symptoms of listed problems will be absent or manageable.   Patient will report less lability of mood  Patient will maintain appropriate boundaries with peers and staff  Patient speech will be notable for appropriate content, rate, and volume  Patient will report less difficulty falling and staying asleep   Outcome: Improving  Patient slept well. Continues to feel positive and feeling hopeful about her future. Interacts often with staff. States that she feels her thinking is clear, and her thoughts are healthy. Looking forward to having her parents visit today. Looking forward to discharge but at the same time does not feel consumed with when she will be leaving.

## 2017-06-25 PROCEDURE — 25000132 ZZH RX MED GY IP 250 OP 250 PS 637: Performed by: PSYCHIATRY & NEUROLOGY

## 2017-06-25 PROCEDURE — 12400001 ZZH R&B MH UMMC

## 2017-06-25 RX ADMIN — GABAPENTIN 100 MG: 100 CAPSULE ORAL at 20:48

## 2017-06-25 RX ADMIN — GABAPENTIN 100 MG: 100 CAPSULE ORAL at 09:10

## 2017-06-25 RX ADMIN — NICOTINE 1 PATCH: 21 PATCH, EXTENDED RELEASE TRANSDERMAL at 09:09

## 2017-06-25 RX ADMIN — RISPERIDONE 0.5 MG: 0.25 TABLET ORAL at 09:10

## 2017-06-25 RX ADMIN — LITHIUM CARBONATE 600 MG: 300 TABLET, EXTENDED RELEASE ORAL at 20:48

## 2017-06-25 RX ADMIN — PROPRANOLOL HYDROCHLORIDE 10 MG: 10 TABLET ORAL at 09:09

## 2017-06-25 RX ADMIN — PRAZOSIN HYDROCHLORIDE 2 MG: 2 CAPSULE ORAL at 20:49

## 2017-06-25 RX ADMIN — GABAPENTIN 100 MG: 100 CAPSULE ORAL at 14:01

## 2017-06-25 RX ADMIN — RISPERIDONE 0.25 MG: 0.25 TABLET ORAL at 14:01

## 2017-06-25 RX ADMIN — RISPERIDONE 1 MG: 1 TABLET ORAL at 20:49

## 2017-06-25 RX ADMIN — LORAZEPAM 0.5 MG: 0.5 TABLET ORAL at 09:57

## 2017-06-25 RX ADMIN — LITHIUM CARBONATE 300 MG: 300 TABLET, EXTENDED RELEASE ORAL at 09:10

## 2017-06-25 RX ADMIN — Medication 9 MG: at 20:48

## 2017-06-25 RX ADMIN — ESZOPICLONE 3 MG: 3 TABLET, FILM COATED ORAL at 20:49

## 2017-06-25 RX ADMIN — OLANZAPINE 10 MG: 10 TABLET, ORALLY DISINTEGRATING ORAL at 09:09

## 2017-06-25 RX ADMIN — PROPRANOLOL HYDROCHLORIDE 10 MG: 10 TABLET ORAL at 20:49

## 2017-06-25 RX ADMIN — NICOTINE POLACRILEX 4 MG: 2 GUM, CHEWING ORAL at 15:39

## 2017-06-26 PROCEDURE — 25000132 ZZH RX MED GY IP 250 OP 250 PS 637: Performed by: PSYCHIATRY & NEUROLOGY

## 2017-06-26 PROCEDURE — 99238 HOSP IP/OBS DSCHRG MGMT 30/<: CPT | Performed by: PSYCHIATRY & NEUROLOGY

## 2017-06-26 PROCEDURE — 12400001 ZZH R&B MH UMMC

## 2017-06-26 PROCEDURE — 90853 GROUP PSYCHOTHERAPY: CPT

## 2017-06-26 PROCEDURE — 97150 GROUP THERAPEUTIC PROCEDURES: CPT | Mod: GO

## 2017-06-26 RX ORDER — OLANZAPINE 10 MG/1
10 TABLET ORAL DAILY PRN
Qty: 30 TABLET | Refills: 1 | Status: SHIPPED | OUTPATIENT
Start: 2017-06-26 | End: 2017-06-30

## 2017-06-26 RX ORDER — PROPRANOLOL HYDROCHLORIDE 10 MG/1
10 TABLET ORAL 2 TIMES DAILY
Qty: 60 TABLET | Refills: 1 | Status: SHIPPED | OUTPATIENT
Start: 2017-06-26 | End: 2017-06-30

## 2017-06-26 RX ORDER — RISPERIDONE 0.5 MG/1
0.5 TABLET ORAL DAILY
Qty: 30 TABLET | Refills: 0 | Status: SHIPPED | OUTPATIENT
Start: 2017-06-26 | End: 2017-06-26

## 2017-06-26 RX ORDER — RISPERIDONE 1 MG/1
1 TABLET ORAL AT BEDTIME
Qty: 30 TABLET | Refills: 1 | Status: SHIPPED | OUTPATIENT
Start: 2017-06-26 | End: 2017-06-30

## 2017-06-26 RX ORDER — LITHIUM CARBONATE 300 MG/1
300 TABLET, FILM COATED, EXTENDED RELEASE ORAL EVERY MORNING
Qty: 30 TABLET | Refills: 1 | Status: SHIPPED | OUTPATIENT
Start: 2017-06-26 | End: 2017-12-19

## 2017-06-26 RX ORDER — LITHIUM CARBONATE 300 MG/1
600 TABLET, FILM COATED, EXTENDED RELEASE ORAL EVERY EVENING
Qty: 60 TABLET | Refills: 1 | Status: SHIPPED | OUTPATIENT
Start: 2017-06-26 | End: 2017-06-30

## 2017-06-26 RX ORDER — GABAPENTIN 100 MG/1
100 CAPSULE ORAL 3 TIMES DAILY
Qty: 90 CAPSULE | Refills: 1 | Status: SHIPPED | OUTPATIENT
Start: 2017-06-26 | End: 2017-06-30

## 2017-06-26 RX ORDER — ESZOPICLONE 3 MG/1
3 TABLET, FILM COATED ORAL AT BEDTIME
Qty: 30 TABLET | Refills: 1 | Status: SHIPPED | OUTPATIENT
Start: 2017-06-26 | End: 2017-06-30

## 2017-06-26 RX ORDER — LORAZEPAM 0.5 MG/1
0.5 TABLET ORAL 2 TIMES DAILY PRN
Qty: 30 TABLET | Refills: 1 | Status: SHIPPED | OUTPATIENT
Start: 2017-06-26 | End: 2017-06-30

## 2017-06-26 RX ORDER — BENZTROPINE MESYLATE 1 MG/1
1 TABLET ORAL 2 TIMES DAILY PRN
Qty: 30 TABLET | Refills: 1 | Status: SHIPPED | OUTPATIENT
Start: 2017-06-26 | End: 2017-06-30

## 2017-06-26 RX ORDER — RISPERIDONE 0.25 MG/1
0.25 TABLET ORAL DAILY
Qty: 30 TABLET | Refills: 1 | Status: SHIPPED | OUTPATIENT
Start: 2017-06-26 | End: 2017-06-30

## 2017-06-26 RX ORDER — RISPERIDONE 0.5 MG/1
0.5 TABLET ORAL DAILY
Qty: 30 TABLET | Refills: 1 | Status: SHIPPED | OUTPATIENT
Start: 2017-06-26 | End: 2017-06-30

## 2017-06-26 RX ORDER — PRAZOSIN HYDROCHLORIDE 2 MG/1
2 CAPSULE ORAL AT BEDTIME
Qty: 30 CAPSULE | Refills: 1 | Status: SHIPPED | OUTPATIENT
Start: 2017-06-26 | End: 2017-06-30

## 2017-06-26 RX ORDER — RISPERIDONE 1 MG/1
1 TABLET ORAL AT BEDTIME
Qty: 30 TABLET | Refills: 0 | Status: SHIPPED | OUTPATIENT
Start: 2017-06-26 | End: 2017-06-26

## 2017-06-26 RX ADMIN — GABAPENTIN 100 MG: 100 CAPSULE ORAL at 08:42

## 2017-06-26 RX ADMIN — LORAZEPAM 0.5 MG: 0.5 TABLET ORAL at 18:40

## 2017-06-26 RX ADMIN — PRAZOSIN HYDROCHLORIDE 2 MG: 2 CAPSULE ORAL at 20:02

## 2017-06-26 RX ADMIN — NICOTINE POLACRILEX 2 MG: 2 GUM, CHEWING ORAL at 08:40

## 2017-06-26 RX ADMIN — GABAPENTIN 100 MG: 100 CAPSULE ORAL at 14:23

## 2017-06-26 RX ADMIN — LITHIUM CARBONATE 300 MG: 300 TABLET, EXTENDED RELEASE ORAL at 08:41

## 2017-06-26 RX ADMIN — NICOTINE 1 PATCH: 21 PATCH, EXTENDED RELEASE TRANSDERMAL at 08:42

## 2017-06-26 RX ADMIN — GABAPENTIN 100 MG: 100 CAPSULE ORAL at 20:03

## 2017-06-26 RX ADMIN — LITHIUM CARBONATE 600 MG: 300 TABLET, EXTENDED RELEASE ORAL at 20:03

## 2017-06-26 RX ADMIN — NICOTINE POLACRILEX 4 MG: 2 GUM, CHEWING ORAL at 12:15

## 2017-06-26 RX ADMIN — RISPERIDONE 0.5 MG: 0.25 TABLET ORAL at 08:41

## 2017-06-26 RX ADMIN — PROPRANOLOL HYDROCHLORIDE 10 MG: 10 TABLET ORAL at 20:03

## 2017-06-26 RX ADMIN — PROPRANOLOL HYDROCHLORIDE 10 MG: 10 TABLET ORAL at 08:41

## 2017-06-26 RX ADMIN — ESZOPICLONE 3 MG: 3 TABLET, FILM COATED ORAL at 20:03

## 2017-06-26 RX ADMIN — NICOTINE POLACRILEX 4 MG: 2 GUM, CHEWING ORAL at 20:07

## 2017-06-26 RX ADMIN — IBUPROFEN 400 MG: 200 TABLET, FILM COATED ORAL at 14:41

## 2017-06-26 RX ADMIN — NICOTINE POLACRILEX 4 MG: 2 GUM, CHEWING ORAL at 18:41

## 2017-06-26 RX ADMIN — Medication 9 MG: at 20:02

## 2017-06-26 RX ADMIN — LORAZEPAM 0.5 MG: 0.5 TABLET ORAL at 14:41

## 2017-06-26 RX ADMIN — RISPERIDONE 1 MG: 1 TABLET ORAL at 20:03

## 2017-06-26 RX ADMIN — RISPERIDONE 0.25 MG: 0.25 TABLET ORAL at 14:23

## 2017-06-26 NOTE — PROGRESS NOTES
I have called all IRTS that I have made referrals to. I left 's ffor all intake workers.  I called the [facility] [place] . I explained that the team is expecting that the pt should be discharged. She rejected the notion of pt going to a crisis home. I did say I would call her supervisor for a second opinion.I did place a call to Amarilis and left a vm and did not get a return call.  I received a call from [facility] and we made plans to have pt admitted there on Friday.  Then the TCM called and said [facility] [place] ([facility]) said they would interview the pt and they have a bed on Friday. I placed a call to Jacob there but never got a call back. The TCM preferred that the pt go to [facility].  [facility] called and said they would admit her tomorrow. At first they said 10AM but then said 4PM.  Pt wants her significant other to transport her. Pt has been lobbying to leave early so she can go home and shower and shave and pack and stop at [facility]. We called the  and left a vm. I consulted with MD who states we will take the direction from the .  We left a vm for Hernan to discuss this plan.

## 2017-06-26 NOTE — PROGRESS NOTES
Pt had a better evening than day after waking up from her daytime nap after taking PRN for restlessness. She was visible at times and enjoyed an extended visit with her significant other and there were no behavioral or mood concerns. She did not have any complaints or concerns except for her extended length of stay. She hopes to DC tomorrow but is well aware of the barriers she has faced.

## 2017-06-26 NOTE — PLAN OF CARE
Problem: General Plan of Care (Inpatient Behavioral)  Goal: Team Discussion  Team Plan:   Outcome: Improving  BEHAVIORAL TEAM DISCUSSION     Participants:   Dr. Najera, Tejal EGAN and Edilma Fletcher RN     Progress:   This pt was admitted due to psychoses and walking for miles on end, leaving home and being dehydrated.  The pt has stabilized with medications and is not experiencing symptoms of psychoses.  Pt is on 3 anti-psychotics and this is something to watch.  Pt is reporting restlessness and agitation.  There has been a concern that the pt is overusing prn medications. This was happening  More last week than this week. MD has asked RN to work with pt on using less prn's.  Lithium level 8 hours on June 6.  Stellazine chaged to respredol since insurance won't cover this.        Continued Stay Criteria/Rationale:   Pt is on a commitment and there has been no available placement and the [facility]  has refused to sign the provisional discharge for an alternate place.  Avoidable days are being documented.      Medical/Physical:   No active issues    Precautions:   Behavioral Orders   Procedures     Code 1 - Restrict to Unit     [place]     MMPI 2     Routine Programming       As clinically indicated     Status 15       Every 15 minutes.     Plan:   Most likely will discharge on Friday. 2 IRTS facilities are being considered.  Outpatient psychiatry on Sept 15  Provisional discharge to be signed by all parties      Rationale for change in precautions or plan:   No changes

## 2017-06-26 NOTE — DISCHARGE SUMMARY
Psychiatric Discharge Summary    Ayana Prasad MRN# [id number]   Age: 26 year old YOB: 1990     Date of Admission:  5/12/2017  Date of Discharge:  Tomorrow, 6/27/2017  Admitting Physician:  Saravanan Santana MD  Discharge Physician:  Kemar Najera MD         Event Leading to Hospitalization:   The patient is a 26-year-old  female with history of bipolar affective disorder who was admitted because of paranoia, possible delusions.  The patient reported that they were 3 black cars following her and she shared her belief with her doctor.  The patient was seen in the emergency department by the same physician years ago for paranoia, was prescribed Vistaril, but did not take it.  Was also seen by DEC in 06/2016 and 07/2016.  The patient today presented as only a partially reliable historian.  She reported that she has been having thoughts that 3 black cars were after her only for the last day or two.  She appeared to be very uninterested in the interview, had difficulties following and tracking and was focused on being discharged as soon as possible.  She repeatedly told me that she would like to stop all questions and would like to be discharged.  When asked about reason for admission, she said that police brought me here.  When asked why the police brought her here, she told me that because I was followed by 3 black cars.  She could not explain any reason for why she would be followed by black cars and who people in black cars could be.  When asked if they were government agency, she said it could have been so.  She reported quite significant anxiety, racing thoughts, not sleeping at night, walking nonstop as she had sunburned neck.  Denied presence of suicidal or homicidal thoughts.  Said that she was diagnosed with bipolar, but did not believe that she had one.  The patient denied auditory or visual hallucinations; however, during the interview, she appeared to be listening to internal  "stimuli and I suspect that she in fact might have been experiencing auditory hallucinations.  From collateral sources the patient was seen off on Friday 1 week ago at [facility] due to blackout from dehydration, forehead contusion and said that her parents kicked her out of the house because they believe that she was using drugs.  According to the patient's friend, back then she said that she was seen in hospital on Saturday and Sunday for panic attacks.  The patient apparently has not been compliant with her current medications, but when asked they were Seroquel and Zyprexa.  Said that she could not take Seroquel because \"it makes me afraid.\"  She did not say anything about Zyprexa.  The patient is frequent marijuana user.       She had extensive previous psychiatric history.  Was hospitalized at [facility] but possibly at [facility] and [facility].  The patient in the past was diagnosed with drug-induced mood disorder, dextromethorphan overdose, borderline personality disorder.  In addition to the above-mentioned medications, she was also treated with lamotrigine, ziprasidone.  The patient apparently does not have a psychiatrist.  Sees a therapist at [facility], her name is Alice.  Never had any chemical dependency treatment.  Treated for cauda equina syndrome in January of this year.  She is allergic to adhesive tape, droperidol causes anxiety.       The patient says she grew up in [place], did not graduate from high school, was a B student.  Minimized any alcohol use but openly admits to using opiates on a regular basis as well as heroin, acid and marijuana.  Had several disorderly conduct charges, several possession charges as well as property damage charges.  Does say that both parents are alive and .  Has 2 siblings, an older brother and older sister.  Denies any mental illness, chemical dependency history in the family.  Says that childhood was happy at times, " other times unhappy.  She denies any physical or sexual abuse while growing up.       See Admission note by Saravanan Santana MD on 05/13/2017 for additional details.          Diagnoses:      1. Schizoaffective disorder - bipolar type  2. PTSD  3. Suspect borderline personality disorder.   4. History of Marijuana use disorder.   5. History of opiate use disorder         Labs:     Recent Results (from the past 1344 hour(s))   CBC with platelets differential    Collection Time: 05/13/17  7:52 AM   Result Value Ref Range    WBC 7.9 4.0 - 11.0 10e9/L    RBC Count 4.91 3.8 - 5.2 10e12/L    Hemoglobin 14.0 11.7 - 15.7 g/dL    Hematocrit 42.4 35.0 - 47.0 %    MCV 86 78 - 100 fl    MCH 28.5 26.5 - 33.0 pg    MCHC 33.0 31.5 - 36.5 g/dL    RDW 13.6 10.0 - 15.0 %    Platelet Count 295 150 - 450 10e9/L    Diff Method Automated Method     % Neutrophils 61.5 %    % Lymphocytes 29.6 %    % Monocytes 6.2 %    % Eosinophils 2.0 %    % Basophils 0.4 %    % Immature Granulocytes 0.3 %    Nucleated RBCs 0 0 /100    Absolute Neutrophil 4.9 1.6 - 8.3 10e9/L    Absolute Lymphocytes 2.3 0.8 - 5.3 10e9/L    Absolute Monocytes 0.5 0.0 - 1.3 10e9/L    Absolute Eosinophils 0.2 0.0 - 0.7 10e9/L    Absolute Basophils 0.0 0.0 - 0.2 10e9/L    Abs Immature Granulocytes 0.0 0 - 0.4 10e9/L    Absolute Nucleated RBC 0.0    Comprehensive metabolic panel    Collection Time: 05/13/17  7:52 AM   Result Value Ref Range    Sodium 143 133 - 144 mmol/L    Potassium 3.7 3.4 - 5.3 mmol/L    Chloride 111 (H) 94 - 109 mmol/L    Carbon Dioxide 25 20 - 32 mmol/L    Anion Gap 7 3 - 14 mmol/L    Glucose 88 70 - 99 mg/dL    Urea Nitrogen 12 7 - 30 mg/dL    Creatinine 0.70 0.52 - 1.04 mg/dL    GFR Estimate >90  Non  GFR Calc   >60 mL/min/1.7m2    GFR Estimate If Black >90   GFR Calc   >60 mL/min/1.7m2    Calcium 8.7 8.5 - 10.1 mg/dL    Bilirubin Total 0.7 0.2 - 1.3 mg/dL    Albumin 3.6 3.4 - 5.0 g/dL    Protein Total 7.4 6.8 - 8.8 g/dL     Alkaline Phosphatase 61 40 - 150 U/L    ALT 27 0 - 50 U/L    AST 24 0 - 45 U/L   TSH with free T4 reflex and/or T3 as indicated    Collection Time: 05/13/17  7:52 AM   Result Value Ref Range    TSH 1.11 0.40 - 4.00 mU/L   Lipid panel    Collection Time: 05/13/17  7:52 AM   Result Value Ref Range    Cholesterol 149 <200 mg/dL    Triglycerides 78 <150 mg/dL    HDL Cholesterol 65 >49 mg/dL    LDL Cholesterol Calculated 68 <100 mg/dL    Non HDL Cholesterol 84 <130 mg/dL   Drug abuse screen 6 urine (chem dep) ([facility])    Collection Time: 05/15/17  5:50 PM   Result Value Ref Range    Amphetamine Qual Urine  NEG     Negative   Cutoff for a negative amphetamine is 500 ng/mL or less.      Barbiturates Qual Urine  NEG     Negative   Cutoff for a negative barbiturate is 200 ng/mL or less.      Benzodiazepine Qual Urine  NEG     Negative   Cutoff for a negative benzodiazepine is 200 ng/mL or less.      Cannabinoids Qual Urine  NEG     Negative   Cutoff for a negative cannabinoid is 50 ng/mL or less.      Cocaine Qual Urine  NEG     Negative   Cutoff for a negative cocaine is 300 ng/mL or less.      Ethanol Qual Urine  NEG     Negative   Cutoff for a negative urine ethanol is 0.05 g/dL or less      Opiates Qualitative Urine  NEG     Negative   Cutoff for a negative opiate is 300 ng/mL or less.     Lithium level    Collection Time: 05/31/17  7:43 AM   Result Value Ref Range    Lithium Level 0.4 (L) 0.6 - 1.2 mmol/L   Lithium level    Collection Time: 06/07/17  7:40 AM   Result Value Ref Range    Lithium Level 0.8 0.6 - 1.2 mmol/L   Creatinine    Collection Time: 06/07/17  7:40 AM   Result Value Ref Range    Creatinine 0.67 0.52 - 1.04 mg/dL    GFR Estimate >90  Non  GFR Calc   >60 mL/min/1.7m2    GFR Estimate If Black >90   GFR Calc   >60 mL/min/1.7m2          Consults:   Consultation during this admission received from internal medicine         Hospital Course:   Ayana Prasad was admitted to  [facility] with attending Saravanan Santana MD who formulated the care plan. She was later transferred to Kemar Najera MD. She was admitted on a 72 hour mental health hold. The patient was granted full commitment through [facility] with substitute decision maker. She was eventually PD and discharge to [facility]. The patient was placed under status 15 (15 minute checks) to ensure patient safety.   Patient  did require seclusion or administration of emergency medications to manage behavior.      The following medication changes took place:   -- Lithium level reviewed; 0.8; continue at 300 mg qday and 600 mg qhs.   -- Stelazine: started and titrated but later cross tapered to Risperdal due to insurance coverage.   -- Risperdal: started t 0.5 mg qam, 0.25 q2pm and 1 mg qhs.   -- Lunesta and Melatonin for insomnia.   --Tolerating propranolol well to reduce anxiety and restlessness  -- Prazosin: continued at 2 mg qhs.   -- PRN Ativan for anxiety. Advised to limit.   -- Gabapentin: added and continued at 100 mg TID for anxiety.     The patient tolerated medications well. Reported mood symptoms resolved. The patient exhibited affective and mood lability consistent with cluster B traits. The patient was active on the unit. The patient was social, engaged and attended groups. Reported hallucinations and observed disorganization resolved with medications adjustments. The reported SI, SHELLEY and self harming behavior resolved. Hospital stay was extended as patient court assigned case manger was no open to referral to crisis care units.  The patient slept well. Appetite was intact. The patient was compliant with medications and care.     Ayana Prasad was released to home. At the time of this encounter, Ayana Prasad was determined to not be a danger to herself or others and symptoms did not meet criteria for involuntary hospitalization.  The patient denied depression, anxiety, racing thoughts, irritability,  hallucinations and paranoia. She was future oriented and denied SI, SHELLEY and HI. She noted tolerating medications well.    Safety plan, post discharge recommendations and relapse prevention were discussed with the patient. The patient agreed to call 911 or present to ED if symptoms worsen or developed thoughts of suicide, self harm or homicide.  The patient agreed to continue medications and outpatient care.         Discharge Medications:     Current Discharge Medication List      START taking these medications    Details   LORazepam (ATIVAN) 0.5 MG tablet Take 1 tablet (0.5 mg) by mouth 2 times daily as needed for anxiety  Qty: 30 tablet, Refills: 1    Associated Diagnoses: Anxiety; Schizoaffective disorder, bipolar type (H)      prazosin (MINIPRESS) 2 MG capsule Take 1 capsule (2 mg) by mouth At Bedtime  Qty: 30 capsule, Refills: 1    Associated Diagnoses: PTSD (post-traumatic stress disorder)      benztropine (COGENTIN) 1 MG tablet Take 1 tablet (1 mg) by mouth 2 times daily as needed (restlessness due to medications)  Qty: 30 tablet, Refills: 1    Associated Diagnoses: Schizoaffective disorder, bipolar type (H)      !! lithium (ESKALITH/LITHOBID) 300 MG CR tablet Take 1 tablet (300 mg) by mouth every morning  Qty: 30 tablet, Refills: 1    Associated Diagnoses: Schizoaffective disorder, bipolar type (H)      !! lithium (ESKALITH/LITHOBID) 300 MG CR tablet Take 2 tablets (600 mg) by mouth every evening  Qty: 60 tablet, Refills: 1    Associated Diagnoses: Schizoaffective disorder, bipolar type (H)      !! risperiDONE (RISPERDAL) 0.25 MG tablet Take 1 tablet (0.25 mg) by mouth daily  Qty: 30 tablet, Refills: 1    Associated Diagnoses: Schizoaffective disorder, bipolar type (H)      propranolol (INDERAL) 10 MG tablet Take 1 tablet (10 mg) by mouth 2 times daily  Qty: 60 tablet, Refills: 1    Associated Diagnoses: Schizoaffective disorder, bipolar type (H); PTSD (post-traumatic stress disorder); Anxiety       eszopiclone (LUNESTA) 3 MG tablet Take 1 tablet (3 mg) by mouth At Bedtime  Qty: 30 tablet, Refills: 1    Associated Diagnoses: Primary insomnia      !! risperiDONE (RISPERDAL) 0.5 MG tablet Take 1 tablet (0.5 mg) by mouth daily  Qty: 30 tablet, Refills: 1    Associated Diagnoses: Schizoaffective disorder, bipolar type (H)      !! risperiDONE (RISPERDAL) 1 MG tablet Take 1 tablet (1 mg) by mouth At Bedtime  Qty: 30 tablet, Refills: 1    Associated Diagnoses: Schizoaffective disorder, bipolar type (H)       !! - Potential duplicate medications found. Please discuss with provider.      CONTINUE these medications which have CHANGED    Details   gabapentin (NEURONTIN) 100 MG capsule Take 1 capsule (100 mg) by mouth 3 times daily  Qty: 90 capsule, Refills: 1    Associated Diagnoses: Anxiety; Schizoaffective disorder, bipolar type (H)      OLANZapine (ZYPREXA) 10 MG tablet Take 1 tablet (10 mg) by mouth daily as needed For severe agitation or hallucinations or paranoia  Qty: 30 tablet, Refills: 1    Associated Diagnoses: Schizoaffective disorder, bipolar type (H)         STOP taking these medications       hydrOXYzine (VISTARIL) 25 MG capsule Comments:   Reason for Stopping:         ondansetron (ZOFRAN) 4 MG tablet Comments:   Reason for Stopping:         baclofen (LIORESAL) 10 MG tablet Comments:   Reason for Stopping:         traZODone (DESYREL) 100 MG tablet Comments:   Reason for Stopping:         DULoxetine HCl (CYMBALTA PO) Comments:   Reason for Stopping:                    Psychiatric and Physical Examinations:   Appearance:  awake, alert, appeared as age stated, well groomed and no apparent distress  Attitude:  cooperative  Eye Contact:  good  Mood:  better and good  Affect:  appropriate and in normal range, intensity is normal and full range and engaged.   Speech:  clear, coherent and normal prosody  Psychomotor Behavior:  no evidence of tardive dyskinesia, dystonia, or tics and intact station, gait and muscle  tone  Thought Process:  linear and goal oriented  Associations:  no loose associations  Thought Content:  no evidence of suicidal ideation or homicidal ideation and no evidence of psychotic thought  Insight:  good  Judgment:  intact  Oriented to:  time, person, and place  Attention Span and Concentration:  intact  Recent and Remote Memory:  intact  Language and Fund of Knowledge: appropriate  Muscle Strength and Tone: normal  Gait and Station: Normal  Vitals:    06/24/17 1900 06/25/17 0900 06/25/17 2011 06/26/17 0700   BP: 122/73  113/81    BP Location:  Right arm  (P) Left arm   Pulse: 94  99    Resp: 16 16  (P) 16   Temp:  96.7  F (35.9  C)  (P) 96.4  F (35.8  C)   TempSrc:       SpO2:       Weight:       Height:              Discharge Plan:     Follow up Appointment:  Attend your new patient psychiatric medication management appointment. Friday, September 15, 2017 from 9-11AM.  Kumar Jackson CNP  [facility]  [email or web address]  [address] [address] [place] [address]  Phone: [phone]  The Health Unit Coordinator has faxed these discharge instructions to [phone]     Schedule a visit with your Primary Care Doctor when the staff feel that this is warranted.  Dr. Ragsdale at [facility]   [phone]     Schedule a visit with your therapist when the staff feel that this is warranted.  Sheila SR  [facility]   [phone]      Attestation:  The patient has been seen and evaluated by me,  Kemar Najera MD

## 2017-06-26 NOTE — PLAN OF CARE
Problem: Manic Symptoms  Intervention: Social and Therapeutic Interv (Manic Symptoms)     Occupational Therapy:  Pt attended 1.5 of 3.0 scheduled OT groups today.  Initially quiet and reserved, but later more interactive.  Shared her enthusiasm about d/c plan.

## 2017-06-26 NOTE — PLAN OF CARE
Problem: Manic Symptoms  Goal: Manic Symptoms  Signs and symptoms of listed problems will be absent or manageable.   Patient will report less lability of mood  Patient will maintain appropriate boundaries with peers and staff  Patient speech will be notable for appropriate content, rate, and volume  Patient will report less difficulty falling and staying asleep   Outcome: Improving  Patient informed that an Irts bed will ne open for her at [facility]. Pleased and hopeful that everything will fall into place now. Went to community meeting today. Patient feels energized and ready to be discharged. Interacting with stafff and peers. Denies self harm thoughts.

## 2017-06-27 VITALS
TEMPERATURE: 98.3 F | RESPIRATION RATE: 16 BRPM | HEIGHT: 66 IN | HEART RATE: 103 BPM | SYSTOLIC BLOOD PRESSURE: 126 MMHG | OXYGEN SATURATION: 97 % | DIASTOLIC BLOOD PRESSURE: 82 MMHG | BODY MASS INDEX: 34.39 KG/M2 | WEIGHT: 214 LBS

## 2017-06-27 PROCEDURE — 25000132 ZZH RX MED GY IP 250 OP 250 PS 637: Performed by: PSYCHIATRY & NEUROLOGY

## 2017-06-27 RX ADMIN — RISPERIDONE 0.5 MG: 0.25 TABLET ORAL at 07:39

## 2017-06-27 RX ADMIN — NICOTINE POLACRILEX 4 MG: 2 GUM, CHEWING ORAL at 07:43

## 2017-06-27 RX ADMIN — LORAZEPAM 0.5 MG: 0.5 TABLET ORAL at 07:39

## 2017-06-27 RX ADMIN — GABAPENTIN 100 MG: 100 CAPSULE ORAL at 07:39

## 2017-06-27 RX ADMIN — LITHIUM CARBONATE 300 MG: 300 TABLET, EXTENDED RELEASE ORAL at 07:39

## 2017-06-27 RX ADMIN — PROPRANOLOL HYDROCHLORIDE 10 MG: 10 TABLET ORAL at 07:39

## 2017-06-27 ASSESSMENT — ACTIVITIES OF DAILY LIVING (ADL)
GROOMING: INDEPENDENT
ORAL_HYGIENE: INDEPENDENT
DRESS: STREET CLOTHES;INDEPENDENT

## 2017-06-27 NOTE — PROGRESS NOTES
I presented the Provisional DIscharge agreement to the pt at the end of the day yesterday. Pt signed this.  I faxed this to the [facility]  yesterday afternoon.  [facility]  left me a vm and called the pt. She was in agreement with having pt go early to get things at home and [employer].  SO Sergio came to the unit and was involved in the discharge planning. The pt and SO Segrio understand that they need to be at [facility] at 4PM.  Pt said she called her parents last night.    I called [facility] - [address]: [phone]/Fax: [phone] - and confirmed that this is the fax I need to send the provisional discharge. I did fax this to this office along with the change of status form. Rightfax shows it went through.  I labeled the original provisional discharge agreement and placed it in the scanning box.  I placed a copy of the PD in the chart.  I gave a copy for pt to take with her.  I e-mailed a copy of the PD to myself.  At the time of discharge I did not get back a signed copy of the PD from the [facility] .  I faxed a copy of the Provisional Discharge agreement and After Visit Summary  to [facility].  [facility]UC faxed AVS to Augustina mendoza [employer] .    Behavioral Discharge Planning and Instructions  Summary:  You were admitted on 5/12/2017  For symptoms of psychosis.  You were treated by Dr. Saravanan Santana MD and then Dr. Najera. A petition for commitment was filed with [facility] and supported.  Your symptoms improved. You were discharged on a provisional discharge on 6/27/2017 from [facility] to an [facility] facility. You were transported by your significant other.      Main Diagnosis:   1. Bipolar affective disorder, acute haven.   2. Opiate use disorder, severe.   3. Marijuana use disorder.   4. Past diagnosis of borderline personality disorder.         Health Care Follow-up Appointments:   The [facility] Services  number is [phone].  Use the [employer] Blue [employer] Service - [phone] - for transportation to your health care appointments.      You received a full commitment through [facility]  to the Commissioner on May 30, 2017.  You do not have a Forced Neuroleptic Order but you do have a Substitute Decision Maker.  Continue to coordinate your legal probate civil court status and your mental health services with your [facility] targeted .  Valdo Valenciarebecca  Ph: [phone]  The Health Unit Coordinator has faxed these discharge instructions to fax: [phone]      You are being admitted to:  [facility] [address] [place] [address]      [place], [address]2 [phone]   Fax: [phone]         Attend your new patient psychiatric medication management appointment. Friday, September 15, 2017 from 9-11AM.  Kumar Jackson, NELLY  [employer]  [email or web address]  [address] [address] [place] [address]  Phone: [phone]  The Health Unit Coordinator has faxed these discharge instructions to [phone]      Schedule a visit with your Primary Care Doctor when the staff feel that this is warranted.  Dr. Ragsdale at [facility]   [phone]    Schedule a visit with your therapist when the staff feel that this is warranted.  [facility]   [phone]       Attend all scheduled appointments with your outpatient providers. Call at least 24 hours in advance if you need to reschedule an appointment to ensure continued access to your outpatient providers.   Major Treatments, Procedures and Findings:  You were provided with: a psychiatric assessment    Symptoms to Report: increased confusion, aggressive feelings    Early warning signs can include: increased unusual thinking, such as paranoia or hearing voices    Safety and Wellness:  Take all medicines as directed.  Make no changes unless your doctor suggests them.      Follow  treatment recommendations.  Refrain from alcohol and non-prescribed drugs.  If there is a concern for safety, call 911.    Resources:   [place]    [facility] Mental Health Crisis Response     [place]: - [phone]  [place]  [facility] Mental Health Crisis Response   [place]: [phone]      The treatment team has appreciated the opportunity to work with you.     If you have any questions or concerns our unit number is [phone].

## 2017-06-27 NOTE — DISCHARGE INSTRUCTIONS
Behavioral Discharge Planning and Instructions  Summary:  You were admitted on 5/12/2017  For symptoms of psychosis.  You were treated by Dr. Saravanan Santana MD and then Dr. Najera. A petition for commitment was filed with [facility] and supported.  Your symptoms improved. You were discharged on a provisional discharge on 6/27/2017 from [facility] to an [facility] facility. You were transported by your significant other.      Main Diagnosis:   1. Bipolar affective disorder, acute haven.   2. Opiate use disorder, severe.   3. Marijuana use disorder.   4. Past diagnosis of borderline personality disorder.         Health Care Follow-up Appointments:   The [employer] number is [phone].  Use the [employer] Blue Ride Service - [phone] - for transportation to your health care appointments.      You received a full commitment through [facility]  to the Commissioner on May 30, 2017.  You do not have a Forced Neuroleptic Order but you do have a Substitute Decision Maker.  Continue to coordinate your legal probate civil court status and your mental health services with your [facility] targeted .  Valdo Aguayo  Ph: [phone]  The Health Unit Coordinator has faxed these discharge instructions to fax: [phone]      You are being admitted to:  [facility] [address] [place] [address]      Will, #2 [phone]   Fax: [phone]         Attend your new patient psychiatric medication management appointment. Friday, September 15, 2017 from 9-11AM.  NELLY [facility] [employer]  [email or web address]  [address] [address] [place] [address]  Phone: [phone]  The Health Unit Coordinator has faxed these discharge instructions to [phone]      Schedule a visit with your Primary Care Doctor when the staff feel that this is warranted.  Dr. Ragsdale at [facility]   [phone]    Schedule a visit with your therapist when the staff feel that this is warranted.  Sheila SR  [facility]   [phone]       Attend all scheduled appointments with your outpatient providers. Call at least 24 hours in advance if you need to reschedule an appointment to ensure continued access to your outpatient providers.   Major Treatments, Procedures and Findings:  You were provided with: a psychiatric assessment    Symptoms to Report: increased confusion, aggressive feelings    Early warning signs can include: increased unusual thinking, such as paranoia or hearing voices    Safety and Wellness:  Take all medicines as directed.  Make no changes unless your doctor suggests them.      Follow treatment recommendations.  Refrain from alcohol and non-prescribed drugs.  If there is a concern for safety, call 911.    Resources:   [facility] Mental Health Crisis Response     Hsieh: - [phone]  [facility] Mental Health Crisis Response   Mellette: [phone]      The treatment team has appreciated the opportunity to work with you.     If you have any questions or concerns our unit number is [phone].

## 2017-06-28 ENCOUNTER — HOSPITAL ENCOUNTER (EMERGENCY)
Facility: CLINIC | Age: 27
Discharge: HOME | End: 2017-06-28
Attending: FAMILY MEDICINE | Admitting: FAMILY MEDICINE
Payer: COMMERCIAL

## 2017-06-28 VITALS
BODY MASS INDEX: 33.44 KG/M2 | WEIGHT: 207.19 LBS | SYSTOLIC BLOOD PRESSURE: 136 MMHG | TEMPERATURE: 97.6 F | HEART RATE: 110 BPM | OXYGEN SATURATION: 99 % | DIASTOLIC BLOOD PRESSURE: 74 MMHG | RESPIRATION RATE: 18 BRPM

## 2017-06-28 DIAGNOSIS — F41.9 ANXIETY: ICD-10-CM

## 2017-06-28 PROCEDURE — 99283 EMERGENCY DEPT VISIT LOW MDM: CPT | Performed by: FAMILY MEDICINE

## 2017-06-28 PROCEDURE — 99284 EMERGENCY DEPT VISIT MOD MDM: CPT | Mod: Z6 | Performed by: FAMILY MEDICINE

## 2017-06-28 NOTE — ED AVS SNAPSHOT
[facility], Emergency Department    [address]    [facility] [address]    Phone:  [phone]    Fax:  [phone]                                       Ayana Prasad   MRN: [id number]    Department:  [facility], Emergency Department   Date of Visit:  6/28/2017           After Visit Summary Signature Page     I have received my discharge instructions, and my questions have been answered. I have discussed any challenges I see with this plan with the nurse or doctor.    ..........................................................................................................................................  Patient/Patient Representative Signature      ..........................................................................................................................................  Patient Representative Print Name and Relationship to Patient    ..................................................               ................................................  Date                                            Time    ..........................................................................................................................................  Reviewed by Signature/Title    ...................................................              ..............................................  Date                                                            Time

## 2017-06-28 NOTE — DISCHARGE INSTRUCTIONS
Discharge back to your facility after receiving your outpatient medications. Plan on following up with outpatient providers

## 2017-06-28 NOTE — ED AVS SNAPSHOT
[facility], Emergency Department    [address]    [place] [address]    Phone:  [phone]    Fax:  [phone]                                       Ayana Prasad   MRN: [id number]    Department:  [facility], Emergency Department   Date of Visit:  6/28/2017           Patient Information     Date Of Birth          1990        Your diagnoses for this visit were:     Anxiety        You were seen by Hector Mendoza MD.      Follow-up Information     Follow up with Chandana Ragsdale MD.    Specialty:  Family Practice    Why:  As needed    Contact information:    [facility]  [address]  [place] [address] [phone]          Discharge Instructions       Discharge back to your facility after receiving your outpatient medications. Plan on following up with outpatient providers    Future Appointments        Provider Department Dept Phone Center    6/30/2017 2:00 PM Chandana Ragsdale MD [facility] [phone] [facility]      24 Hour Appointment Hotline       To make an appointment at any [facility], call [phone] ([phone]). If you don't have a family doctor or clinic, we will help you find one. [facility] are conveniently located to serve the needs of you and your family.             Review of your medicines      Our records show that you are taking the medicines listed below. If these are incorrect, please call your family doctor or clinic.        Dose / Directions Last dose taken    benztropine 1 MG tablet   Commonly known as:  COGENTIN   Dose:  1 mg   Quantity:  30 tablet        Take 1 tablet (1 mg) by mouth 2 times daily as needed (restlessness due to medications)   Refills:  1        eszopiclone 3 MG tablet   Commonly known as:  LUNESTA   Dose:  3 mg   Quantity:  30 tablet        Take 1 tablet (3 mg) by mouth At Bedtime   Refills:  1        gabapentin 100 MG capsule   Commonly known as:  NEURONTIN   Dose:  100 mg    Quantity:  90 capsule        Take 1 capsule (100 mg) by mouth 3 times daily   Refills:  1        * lithium 300 MG CR tablet   Commonly known as:  ESKALITH/LITHOBID   Dose:  300 mg   Quantity:  30 tablet        Take 1 tablet (300 mg) by mouth every morning   Refills:  1        * lithium 300 MG CR tablet   Commonly known as:  ESKALITH/LITHOBID   Dose:  600 mg   Quantity:  60 tablet        Take 2 tablets (600 mg) by mouth every evening   Refills:  1        LORazepam 0.5 MG tablet   Commonly known as:  ATIVAN   Dose:  0.5 mg   Quantity:  30 tablet        Take 1 tablet (0.5 mg) by mouth 2 times daily as needed for anxiety   Refills:  1        OLANZapine 10 MG tablet   Commonly known as:  zyPREXA   Dose:  10 mg   Quantity:  30 tablet        Take 1 tablet (10 mg) by mouth daily as needed For severe agitation or hallucinations or paranoia   Refills:  1        prazosin 2 MG capsule   Commonly known as:  MINIPRESS   Dose:  2 mg   Quantity:  30 capsule        Take 1 capsule (2 mg) by mouth At Bedtime   Refills:  1        propranolol 10 MG tablet   Commonly known as:  INDERAL   Dose:  10 mg   Quantity:  60 tablet        Take 1 tablet (10 mg) by mouth 2 times daily   Refills:  1        * risperiDONE 0.25 MG tablet   Commonly known as:  risperDAL   Dose:  0.25 mg   Quantity:  30 tablet        Take 1 tablet (0.25 mg) by mouth daily   Refills:  1        * risperiDONE 0.5 MG tablet   Commonly known as:  risperDAL   Dose:  0.5 mg   Quantity:  30 tablet        Take 1 tablet (0.5 mg) by mouth daily   Refills:  1        * risperiDONE 1 MG tablet   Commonly known as:  risperDAL   Dose:  1 mg   Quantity:  30 tablet        Take 1 tablet (1 mg) by mouth At Bedtime   Refills:  1        * Notice:  This list has 5 medication(s) that are the same as other medications prescribed for you. Read the directions carefully, and ask your doctor or other care provider to review them with you.            Orders Needing Specimen Collection     None       Pending Results     No orders found from 6/26/2017 to 6/29/2017.            Pending Culture Results     No orders found from 6/26/2017 to 6/29/2017.            Pending Results Instructions     If you had any lab results that were not finalized at the time of your Discharge, you can call the ED Lab Result RN at [phone]. You will be contacted by this team for any positive Lab results or changes in treatment. The nurses are available 7 days a week from 10A to 6:30P.  You can leave a message 24 hours per day and they will return your call.        Thank you for choosing [facility]       Thank you for choosing [facility] for your care. Our goal is always to provide you with excellent care. Hearing back from our patients is one way we can continue to improve our services. Please take a few minutes to complete the written survey that you may receive in the mail after you visit with us. Thank you!        [employer] Information     [employer] gives you secure access to your electronic health record. If you see a primary care provider, you can also send messages to your care team and make appointments. If you have questions, please call your primary care clinic.  If you do not have a primary care provider, please call [phone] and they will assist you.        Care EveryWhere ID     This is your Care EveryWhere ID. This could be used by other organizations to access your [facility] medical records  [id number]        Equal Access to Services     PIPPA KOCH : Hadii dale Price, waaxda luqadaha, qaybta kaalmacyn beaulieu, fozia elizabeth. So [facility] [phone].    ATENCIÓN: Si habla español, tiene a xiong disposición servicios gratuitos de asistencia lingüística. Llame al [phone].    We comply with applicable federal civil rights laws and [place] laws. We do not discriminate on the basis of race, color, national origin, age, disability sex, sexual orientation or gender identity.             After Visit Summary       This is your record. Keep this with you and show to your community pharmacist(s) and doctor(s) at your next visit.

## 2017-06-29 ASSESSMENT — ENCOUNTER SYMPTOMS
ABDOMINAL PAIN: 0
FEVER: 0
SHORTNESS OF BREATH: 0
NERVOUS/ANXIOUS: 1
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
## 2017-06-30 ENCOUNTER — OFFICE VISIT (OUTPATIENT)
Dept: FAMILY MEDICINE | Facility: CLINIC | Age: 27
End: 2017-06-30
Payer: COMMERCIAL

## 2017-06-30 VITALS
SYSTOLIC BLOOD PRESSURE: 136 MMHG | HEART RATE: 108 BPM | HEIGHT: 66 IN | DIASTOLIC BLOOD PRESSURE: 84 MMHG | WEIGHT: 211.4 LBS | TEMPERATURE: 100 F | BODY MASS INDEX: 33.97 KG/M2

## 2017-06-30 DIAGNOSIS — F51.01 PRIMARY INSOMNIA: ICD-10-CM

## 2017-06-30 DIAGNOSIS — F25.0 SCHIZOAFFECTIVE DISORDER, BIPOLAR TYPE (H): ICD-10-CM

## 2017-06-30 DIAGNOSIS — F43.10 PTSD (POST-TRAUMATIC STRESS DISORDER): ICD-10-CM

## 2017-06-30 DIAGNOSIS — F41.9 ANXIETY: ICD-10-CM

## 2017-06-30 PROCEDURE — 99495 TRANSJ CARE MGMT MOD F2F 14D: CPT | Performed by: FAMILY MEDICINE

## 2017-06-30 RX ORDER — PRAZOSIN HYDROCHLORIDE 2 MG/1
2 CAPSULE ORAL AT BEDTIME
Qty: 90 CAPSULE | Refills: 1 | Status: SHIPPED | OUTPATIENT
Start: 2017-06-30 | End: 2017-12-19

## 2017-06-30 RX ORDER — BENZTROPINE MESYLATE 1 MG/1
1 TABLET ORAL 2 TIMES DAILY PRN
Qty: 90 TABLET | Refills: 1 | Status: SHIPPED | OUTPATIENT
Start: 2017-06-30 | End: 2017-12-19

## 2017-06-30 RX ORDER — OLANZAPINE 10 MG/1
10 TABLET ORAL DAILY PRN
Qty: 30 TABLET | Refills: 1 | Status: SHIPPED | OUTPATIENT
Start: 2017-06-30 | End: 2018-02-08

## 2017-06-30 RX ORDER — PROPRANOLOL HYDROCHLORIDE 10 MG/1
10 TABLET ORAL 2 TIMES DAILY
Qty: 180 TABLET | Refills: 1 | Status: SHIPPED | OUTPATIENT
Start: 2017-06-30 | End: 2017-12-19

## 2017-06-30 RX ORDER — RISPERIDONE 1 MG/1
1 TABLET ORAL AT BEDTIME
Qty: 30 TABLET | Refills: 1 | Status: SHIPPED | OUTPATIENT
Start: 2017-06-30 | End: 2017-12-19 | Stop reason: ALTCHOICE

## 2017-06-30 RX ORDER — RISPERIDONE 0.25 MG/1
0.25 TABLET ORAL DAILY
Qty: 30 TABLET | Refills: 1 | Status: SHIPPED | OUTPATIENT
Start: 2017-06-30 | End: 2017-12-19 | Stop reason: ALTCHOICE

## 2017-06-30 RX ORDER — GABAPENTIN 100 MG/1
100 CAPSULE ORAL 3 TIMES DAILY
Qty: 270 CAPSULE | Refills: 1 | Status: SHIPPED | OUTPATIENT
Start: 2017-06-30 | End: 2017-12-19

## 2017-06-30 RX ORDER — ESZOPICLONE 3 MG/1
3 TABLET, FILM COATED ORAL AT BEDTIME
Qty: 30 TABLET | Refills: 2 | Status: SHIPPED | OUTPATIENT
Start: 2017-06-30 | End: 2017-12-19

## 2017-06-30 RX ORDER — LITHIUM CARBONATE 300 MG/1
TABLET, FILM COATED, EXTENDED RELEASE ORAL
Qty: 270 TABLET | Refills: 1 | Status: SHIPPED | OUTPATIENT
Start: 2017-06-30 | End: 2017-12-19

## 2017-06-30 RX ORDER — LORAZEPAM 0.5 MG/1
0.5 TABLET ORAL 2 TIMES DAILY PRN
Qty: 30 TABLET | Refills: 1 | Status: SHIPPED | OUTPATIENT
Start: 2017-06-30 | End: 2017-12-19

## 2017-06-30 RX ORDER — RISPERIDONE 0.5 MG/1
0.5 TABLET ORAL DAILY
Qty: 90 TABLET | Refills: 1 | Status: SHIPPED | OUTPATIENT
Start: 2017-06-30 | End: 2017-12-19 | Stop reason: ALTCHOICE

## 2017-06-30 NOTE — NURSING NOTE
"Chief Complaint   Patient presents with     Hospital F/U       Initial /84  Pulse 108  Temp 100  F (37.8  C) (Tympanic)  Ht 5' 6\" (1.676 m)  Wt 211 lb 6.4 oz (95.9 kg)  LMP  (LMP Unknown)  Breastfeeding? No  BMI 34.12 kg/m2 Estimated body mass index is 34.12 kg/(m^2) as calculated from the following:    Height as of this encounter: 5' 6\" (1.676 m).    Weight as of this encounter: 211 lb 6.4 oz (95.9 kg).  Medication Reconciliation: complete     Sarahi Abraham CMA      "
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
## 2017-06-30 NOTE — MR AVS SNAPSHOT
After Visit Summary   6/30/2017    Ayana Prasad    MRN: [id number]           Patient Information     Date Of Birth          1990        Visit Information        Provider Department      6/30/2017 2:00 PM Chandana Ragsdale MD [facility]        Today's Diagnoses     Anxiety        Schizoaffective disorder, bipolar type (H)        PTSD (post-traumatic stress disorder)        Primary insomnia          Care Instructions    Refills medications.   Follow-up with psychiatrist as planned.          Follow-ups after your visit        Follow-up notes from your care team     Return in about 2 months (around 8/30/2017).      Who to contact     Normal or non-critical lab and imaging results will be communicated to you by [employer], letter or phone within 4 business days after the clinic has received the results. If you do not hear from us within 7 days, please contact the clinic through [employer] or phone. If you have a critical or abnormal lab result, we will notify you by phone as soon as possible.  Submit refill requests through [employer] or call your pharmacy and they will forward the refill request to us. Please allow 3 business days for your refill to be completed.          If you need to speak with a  for additional information , please call: [phone]           Additional Information About Your Visit        MyChart Information     [employer] gives you secure access to your electronic health record. If you see a primary care provider, you can also send messages to your care team and make appointments. If you have questions, please call your primary care clinic.  If you do not have a primary care provider, please call [phone] and they will assist you.        Care EveryWhere ID     This is your Care EveryWhere ID. This could be used by other organizations to access your [facility] medical records  [id number]        Your Vitals Were     Pulse Temperature Height Last Period  "Breastfeeding? BMI (Body Mass Index)    108 100  F (37.8  C) (Tympanic) 5' 6\" (1.676 m) (LMP Unknown) No 34.12 kg/m2       Blood Pressure from Last 3 Encounters:   06/30/17 136/84   06/28/17 136/74   06/26/17 126/82    Weight from Last 3 Encounters:   06/30/17 211 lb 6.4 oz (95.9 kg)   06/28/17 207 lb 3 oz (94 kg)   06/27/17 214 lb (97.1 kg)              Today, you had the following     No orders found for display         Today's Medication Changes          These changes are accurate as of: 6/30/17  3:03 PM.  If you have any questions, ask your nurse or doctor.               These medicines have changed or have updated prescriptions.        Dose/Directions    * lithium 300 MG CR tablet   Commonly known as:  ESKALITH/LITHOBID   This may have changed:  Another medication with the same name was changed. Make sure you understand how and when to take each.   Used for:  Schizoaffective disorder, bipolar type (H)        Dose:  300 mg   Take 1 tablet (300 mg) by mouth every morning   Quantity:  30 tablet   Refills:  1       * lithium 300 MG CR tablet   Commonly known as:  ESKALITH/LITHOBID   This may have changed:    - how much to take  - how to take this  - when to take this  - additional instructions   Used for:  Schizoaffective disorder, bipolar type (H)   Changed by:  Chandana Ragsdale MD        Two each evening and one each morning.   Quantity:  270 tablet   Refills:  1       * risperiDONE 0.25 MG tablet   Commonly known as:  risperDAL   This may have changed:  Another medication with the same name was changed. Make sure you understand how and when to take each.   Used for:  Schizoaffective disorder, bipolar type (H)   Changed by:  Chandana Ragsdale MD        Dose:  0.25 mg   Take 1 tablet (0.25 mg) by mouth daily   Quantity:  30 tablet   Refills:  1       * risperiDONE 0.5 MG tablet   Commonly known as:  risperDAL   This may have changed:  additional instructions   Used for:  Schizoaffective disorder, bipolar type " (H)   Changed by:  Chandana Ragsdale MD        Dose:  0.5 mg   Take 1 tablet (0.5 mg) by mouth daily At 5 PM   Quantity:  90 tablet   Refills:  1       * risperiDONE 1 MG tablet   Commonly known as:  risperDAL   This may have changed:  Another medication with the same name was changed. Make sure you understand how and when to take each.   Used for:  Schizoaffective disorder, bipolar type (H)   Changed by:  Chandana Ragsdale MD        Dose:  1 mg   Take 1 tablet (1 mg) by mouth At Bedtime   Quantity:  30 tablet   Refills:  1       * Notice:  This list has 5 medication(s) that are the same as other medications prescribed for you. Read the directions carefully, and ask your doctor or other care provider to review them with you.         Where to get your medicines      These medications were [place] [facility] [place] [address], [place] [address]     Phone:  [phone]     benztropine 1 MG tablet    gabapentin 100 MG capsule    lithium 300 MG CR tablet    OLANZapine 10 MG tablet    prazosin 2 MG capsule    propranolol 10 MG tablet    risperiDONE 0.25 MG tablet    risperiDONE 0.5 MG tablet    risperiDONE 1 MG tablet         Some of these will need a paper prescription and others can be bought over the counter.  Ask your nurse if you have questions.     Bring a paper prescription for each of these medications     eszopiclone 3 MG tablet    LORazepam 0.5 MG tablet                Primary Care Provider Office Phone # Fax #    Chandana Ragsdale [phone]       [facility] [id number] [place] [address] [address] [facility] [address]        Equal Access to Services     [facility]: Hadii dale ku hadasho Soomaali, waaxda luqadaha, qaybta kaalmada adeegyada, fozia elizabeth. So [facility] [phone].    ATENCIÓN: Si habla español, tiene a xiong disposición servicios gratuitos de asistencia lingüística. Llame al [phone].    We comply  with applicable federal civil rights laws and [place] laws. We do not discriminate on the basis of race, color, national origin, age, disability sex, sexual orientation or gender identity.            Thank you!     Thank you for choosing [facility]  for your care. Our goal is always to provide you with excellent care. Hearing back from our patients is one way we can continue to improve our services. Please take a few minutes to complete the written survey that you may receive in the mail after your visit with us. Thank you!             Your Updated Medication List - Protect others around you: Learn how to safely use, store and throw away your medicines at www.disposemymeds.org.          This list is accurate as of: 6/30/17  3:03 PM.  Always use your most recent med list.                   Brand Name Dispense Instructions for use Diagnosis    benztropine 1 MG tablet    COGENTIN    90 tablet    Take 1 tablet (1 mg) by mouth 2 times daily as needed (restlessness due to medications)    Schizoaffective disorder, bipolar type (H)       eszopiclone 3 MG tablet    LUNESTA    30 tablet    Take 1 tablet (3 mg) by mouth At Bedtime    Primary insomnia       gabapentin 100 MG capsule    NEURONTIN    270 capsule    Take 1 capsule (100 mg) by mouth 3 times daily    Anxiety, Schizoaffective disorder, bipolar type (H)       * lithium 300 MG CR tablet    ESKALITH/LITHOBID    30 tablet    Take 1 tablet (300 mg) by mouth every morning    Schizoaffective disorder, bipolar type (H)       * lithium 300 MG CR tablet    ESKALITH/LITHOBID    270 tablet    Two each evening and one each morning.    Schizoaffective disorder, bipolar type (H)       LORazepam 0.5 MG tablet    ATIVAN    30 tablet    Take 1 tablet (0.5 mg) by mouth 2 times daily as needed for anxiety    Anxiety, Schizoaffective disorder, bipolar type (H)       OLANZapine 10 MG tablet    zyPREXA    30 tablet    Take 1 tablet (10 mg) by mouth daily as needed For  severe agitation or hallucinations or paranoia    Schizoaffective disorder, bipolar type (H)       prazosin 2 MG capsule    MINIPRESS    90 capsule    Take 1 capsule (2 mg) by mouth At Bedtime    PTSD (post-traumatic stress disorder)       propranolol 10 MG tablet    INDERAL    180 tablet    Take 1 tablet (10 mg) by mouth 2 times daily    Schizoaffective disorder, bipolar type (H), PTSD (post-traumatic stress disorder), Anxiety       * risperiDONE 0.25 MG tablet    risperDAL    30 tablet    Take 1 tablet (0.25 mg) by mouth daily    Schizoaffective disorder, bipolar type (H)       * risperiDONE 0.5 MG tablet    risperDAL    90 tablet    Take 1 tablet (0.5 mg) by mouth daily At 5 PM    Schizoaffective disorder, bipolar type (H)       * risperiDONE 1 MG tablet    risperDAL    30 tablet    Take 1 tablet (1 mg) by mouth At Bedtime    Schizoaffective disorder, bipolar type (H)       * Notice:  This list has 5 medication(s) that are the same as other medications prescribed for you. Read the directions carefully, and ask your doctor or other care provider to review them with you.

## 2017-07-03 ENCOUNTER — CARE COORDINATION (OUTPATIENT)
Dept: CARE COORDINATION | Facility: CLINIC | Age: 27
End: 2017-07-03

## 2017-07-03 NOTE — PROGRESS NOTES
Clinic Care Coordination Contact    Situation: Patient chart reviewed by care coordinator.    Background: pt was discharged from the hospital on 6-27 and had follow up Ed visit on 6-28 with PCP appointment 6-30    Assessment: further review of chart and discharge summary from hospital noted that pt was discharged with a provisional discharge to [facility].  Pt has a targeted   and therapist.      Plan/Recommendations: Will route to the Sw assigned to this clinic for follow up at a later date, after IRT discharge.     RAYMUNDO Trejo, Clinic Care Coordinator 7/3/2017   12:57 PM  [phone]

## 2017-07-03 NOTE — PROGRESS NOTES
Patient has had a lot of difficulty with consistent follow-up. Care coordination follow-up would be appreciated.

## 2017-07-03 NOTE — PROGRESS NOTES
Clinic Care Coordination Contact  Care Team Conversations    RN/CC asked Sw to review chart as pt was on Ed follow up list.  Pt has had an appointment with PCP in the time between ED and notification of call.  Pt does have multiple Mental health diagnoses per problem list.  Will route chart to PCP to ask their input for any CC needs and follow up per their recommendations.     RAYMUNDO Trejo, Clinic Care Coordinator 7/3/2017   12:12 PM  [phone]

## 2017-07-07 ENCOUNTER — TELEPHONE (OUTPATIENT)
Dept: FAMILY MEDICINE | Facility: CLINIC | Age: 27
End: 2017-07-07

## 2017-07-07 NOTE — TELEPHONE ENCOUNTER
Daysi from [facility] called requesting standing orders for over the counter cold/headache meds be placed for pt and faxed to [phone].  Please advise    Nancy De La Cruz, [place]

## 2017-07-24 ENCOUNTER — CARE COORDINATION (OUTPATIENT)
Dept: CARE COORDINATION | Facility: CLINIC | Age: 27
End: 2017-07-24

## 2017-07-24 NOTE — PROGRESS NOTES
Clinic Care Coordination Contact    AUGIE reviewed pt's EMR and discovered the following.  SW forwarded this information to pt's PCP for review as well.      A petition for commitment was filed with [facility] and supported; you received a full commitment through [facility] to the Commissioner on May 30, 2017 for an initial period not to exceed six months.  PCP, please read document in pt's EMR, media tab, scanned 7/7/17 at 2:02 regarding medications.  The pt does not have a Forced Neuroleptic Order but you do have a Substitute Decision Maker, Andrea Torres, [address] [place]  [address].  Phone [phone].  Mr. Torres is the pt's  substitute decision maker for all neuroleptic medications during the civil commitment period.    The pt was discharged on a provisional discharge on 6/27/2017 to an [facility] ([facility] facility, [facility], located at [address], [place] [address]. Phone: [phone]/Fax: [phone]  The pt was informed to continue to coordinate your legal probate civil court status & mental health services with her [facility] targeted :  Valdo Aguayo, [phone].  SW to close pt to clinic care coordination today as pt is living in a IRTS facility.  Please place care coordination referral should pt have future needs.    Nancy Jaramillo  Social Work Care Coordinator  [facility]  [phone]

## 2017-08-10 ENCOUNTER — OFFICE VISIT (OUTPATIENT)
Dept: FAMILY MEDICINE | Facility: CLINIC | Age: 27
End: 2017-08-10
Payer: COMMERCIAL

## 2017-08-10 ENCOUNTER — TELEPHONE (OUTPATIENT)
Dept: FAMILY MEDICINE | Facility: CLINIC | Age: 27
End: 2017-08-10

## 2017-08-10 VITALS
WEIGHT: 215.6 LBS | BODY MASS INDEX: 34.8 KG/M2 | SYSTOLIC BLOOD PRESSURE: 100 MMHG | HEART RATE: 72 BPM | DIASTOLIC BLOOD PRESSURE: 66 MMHG | TEMPERATURE: 98.3 F

## 2017-08-10 DIAGNOSIS — F90.0 ATTENTION DEFICIT HYPERACTIVITY DISORDER (ADHD), PREDOMINANTLY INATTENTIVE TYPE: ICD-10-CM

## 2017-08-10 DIAGNOSIS — L70.0 ACNE VULGARIS: ICD-10-CM

## 2017-08-10 DIAGNOSIS — F43.10 PTSD (POST-TRAUMATIC STRESS DISORDER): ICD-10-CM

## 2017-08-10 DIAGNOSIS — F25.0 SCHIZOAFFECTIVE DISORDER, BIPOLAR TYPE (H): ICD-10-CM

## 2017-08-10 DIAGNOSIS — F12.10 MARIJUANA ABUSE: ICD-10-CM

## 2017-08-10 DIAGNOSIS — F31.11 BIPOLAR 1 DISORDER, MANIC, MILD (H): Primary | ICD-10-CM

## 2017-08-10 DIAGNOSIS — M54.50 ACUTE LEFT-SIDED LOW BACK PAIN WITHOUT SCIATICA: Primary | ICD-10-CM

## 2017-08-10 PROCEDURE — 99213 OFFICE O/P EST LOW 20 MIN: CPT | Performed by: NURSE PRACTITIONER

## 2017-08-10 RX ORDER — HYDROCODONE BITARTRATE AND ACETAMINOPHEN 5; 325 MG/1; MG/1
1-2 TABLET ORAL EVERY 4 HOURS PRN
Qty: 40 TABLET | Refills: 0 | Status: SHIPPED | OUTPATIENT
Start: 2017-08-10 | End: 2017-12-19

## 2017-08-10 ASSESSMENT — ENCOUNTER SYMPTOMS
CONSTIPATION: 0
NAUSEA: 0
SINUS PRESSURE: 0
DIARRHEA: 0
DIAPHORESIS: 0
COUGH: 0
EYE DISCHARGE: 0
LIGHT-HEADEDNESS: 0
NUMBNESS: 1
RHINORRHEA: 0
HEADACHES: 0
FEVER: 0
FATIGUE: 0
CHILLS: 0
DIZZINESS: 0
SHORTNESS OF BREATH: 0
SORE THROAT: 0
EYE ITCHING: 0
BACK PAIN: 1
WHEEZING: 0

## 2017-08-10 NOTE — MR AVS SNAPSHOT
After Visit Summary   8/10/2017    Ayana Prasad    MRN: [id number]           Patient Information     Date Of Birth          1990        Visit Information        Provider Department      8/10/2017 1:40 PM Becki Avery APRN [facility]        Today's Diagnoses     Acute left-sided low back pain without sciatica    -  1    Acne vulgaris           Follow-ups after your visit        Additional Services     DERMATOLOGY REFERRAL       Your provider has referred you to: [facility]: [facility] [place] [phone]   [email or web address]    Please be aware that coverage of these services is subject to the terms and limitations of your health insurance plan.  Call member services at your health plan with any benefit or coverage questions.      Please bring the following with you to your appointment:    (1) Any X-Rays, CTs or MRIs which have been performed.  Contact the facility where they were done to arrange for  prior to your scheduled appointment.    (2) List of current medications  (3) This referral request   (4) Any documents/labs given to you for this referral                  Who to contact     Normal or non-critical lab and imaging results will be communicated to you by [employer], letter or phone within 4 business days after the clinic has received the results. If you do not hear from us within 7 days, please contact the clinic through [employer] or phone. If you have a critical or abnormal lab result, we will notify you by phone as soon as possible.  Submit refill requests through [employer] or call your pharmacy and they will forward the refill request to us. Please allow 3 business days for your refill to be completed.          If you need to speak with a  for additional information , please call: [phone]           Additional Information About Your Visit        [employer] Information     [employer] gives you secure access  to your electronic health record. If you see a primary care provider, you can also send messages to your care team and make appointments. If you have questions, please call your primary care clinic.  If you do not have a primary care provider, please call [phone] and they will assist you.        Care EveryWhere ID     This is your Care EveryWhere ID. This could be used by other organizations to access your [facility] medical records  [id number]        Your Vitals Were     Pulse Temperature BMI (Body Mass Index)             72 98.3  F (36.8  C) (Tympanic) 34.8 kg/m2          Blood Pressure from Last 3 Encounters:   08/10/17 100/66   06/30/17 136/84   06/28/17 136/74    Weight from Last 3 Encounters:   08/10/17 215 lb 9.6 oz (97.8 kg)   06/30/17 211 lb 6.4 oz (95.9 kg)   06/28/17 207 lb 3 oz (94 kg)              We Performed the Following     DERMATOLOGY REFERRAL          Today's Medication Changes          These changes are accurate as of: 8/10/17  1:59 PM.  If you have any questions, ask your nurse or doctor.               Start taking these medicines.        Dose/Directions    HYDROcodone-acetaminophen 5-325 MG per tablet   Commonly known as:  NORCO   Used for:  Acute left-sided low back pain without sciatica   Started by:  Becki Avery APRN CNP        Dose:  1-2 tablet   Take 1-2 tablets by mouth every 4 hours as needed for moderate to severe pain maximum 6 tablet(s) per day   Quantity:  40 tablet   Refills:  0            Where to get your medicines      Some of these will need a paper prescription and others can be bought over the counter.  Ask your nurse if you have questions.     Bring a paper prescription for each of these medications     HYDROcodone-acetaminophen 5-325 MG per tablet                Primary Care Provider Office Phone # Fax #    Chandana Ragsdale [phone] [address] [id number]        Equal Access to Services     [employer] KOCH AH: Gar[place]  dale Price, waaxda luqadaha, qaybta kaalmada brittnee, waxmonica dayanna cheemasaedb roca clara. So [facility] [phone].    ATENCIÓN: Si habla jatin, tiene a xiong disposición servicios gratuitos de asistencia lingüística. Nishant al [phone].    We comply with applicable federal civil rights laws and [place] laws. We do not discriminate on the basis of race, color, national origin, age, disability sex, sexual orientation or gender identity.            Thank you!     Thank you for choosing [facility]  for your care. Our goal is always to provide you with excellent care. Hearing back from our patients is one way we can continue to improve our services. Please take a few minutes to complete the written survey that you may receive in the mail after your visit with us. Thank you!             Your Updated Medication List - Protect others around you: Learn how to safely use, store and throw away your medicines at www.disposemymeds.org.          This list is accurate as of: 8/10/17  1:59 PM.  Always use your most recent med list.                   Brand Name Dispense Instructions for use Diagnosis    benztropine 1 MG tablet    COGENTIN    90 tablet    Take 1 tablet (1 mg) by mouth 2 times daily as needed (restlessness due to medications)    Schizoaffective disorder, bipolar type (H)       eszopiclone 3 MG tablet    LUNESTA    30 tablet    Take 1 tablet (3 mg) by mouth At Bedtime    Primary insomnia       gabapentin 100 MG capsule    NEURONTIN    270 capsule    Take 1 capsule (100 mg) by mouth 3 times daily    Anxiety, Schizoaffective disorder, bipolar type (H)       HYDROcodone-acetaminophen 5-325 MG per tablet    NORCO    40 tablet    Take 1-2 tablets by mouth every 4 hours as needed for moderate to severe pain maximum 6 tablet(s) per day    Acute left-sided low back pain without sciatica       IBUPROFEN PO           * lithium 300 MG CR tablet    ESKALITH/LITHOBID    30 tablet    Take 1  tablet (300 mg) by mouth every morning    Schizoaffective disorder, bipolar type (H)       * lithium 300 MG CR tablet    ESKALITH/LITHOBID    270 tablet    Two each evening and one each morning.    Schizoaffective disorder, bipolar type (H)       LORazepam 0.5 MG tablet    ATIVAN    30 tablet    Take 1 tablet (0.5 mg) by mouth 2 times daily as needed for anxiety    Anxiety, Schizoaffective disorder, bipolar type (H)       OLANZapine 10 MG tablet    zyPREXA    30 tablet    Take 1 tablet (10 mg) by mouth daily as needed For severe agitation or hallucinations or paranoia    Schizoaffective disorder, bipolar type (H)       prazosin 2 MG capsule    MINIPRESS    90 capsule    Take 1 capsule (2 mg) by mouth At Bedtime    PTSD (post-traumatic stress disorder)       propranolol 10 MG tablet    INDERAL    180 tablet    Take 1 tablet (10 mg) by mouth 2 times daily    Schizoaffective disorder, bipolar type (H), PTSD (post-traumatic stress disorder), Anxiety       * risperiDONE 0.25 MG tablet    risperDAL    30 tablet    Take 1 tablet (0.25 mg) by mouth daily    Schizoaffective disorder, bipolar type (H)       * risperiDONE 0.5 MG tablet    risperDAL    90 tablet    Take 1 tablet (0.5 mg) by mouth daily At 5 PM    Schizoaffective disorder, bipolar type (H)       * risperiDONE 1 MG tablet    risperDAL    30 tablet    Take 1 tablet (1 mg) by mouth At Bedtime    Schizoaffective disorder, bipolar type (H)       TYLENOL PO           * Notice:  This list has 5 medication(s) that are the same as other medications prescribed for you. Read the directions carefully, and ask your doctor or other care provider to review them with you.

## 2017-08-10 NOTE — TELEPHONE ENCOUNTER
Left message for patient to return call to clinic. Need to tell patient that her appointment with Dr. Emmanuel at [facility] is tomorrow morning (8/11/17) at 8 am.  Becki spoke with Dr. Emmanuel today to confirm this appointment.   Cassia Price CMA

## 2017-08-10 NOTE — PROGRESS NOTES
SUBJECTIVE:                                                    Ayana Prasad is a 27 year old female who presents to clinic today for the following health issues:      ED/UC Followup:    [facility]  [facility]  Date of visit: 8/8/17  Reason for visit: back pain, left leg numbness-prescribed enough Norco 5-325 mg for 2 days.    Current Status: Still having a lot of pain. Will be seeing spine specialist at [facility] 8/21/17. Would like more [place] today to get her through until this appointment. Will be discussing another surgery.  Last back surgery was 12/24/16.      Would like a prescription to treat the acne on her face and arms. Has tried Proactive-this did not help.     Has never had a pap.  She doesn't see the point. Importance of early detection of cervical cancer was explained. She states she will return for physical and pap before she has her next back surgery.     No longer has normal periods. Had normal periods from 16-18.  Has irregular bleeding rarely. Has never looked into why her periods are not normal.    Woke up on Monday with severe back pain. Left leg numbness. This is the same are of numbness as has had in the past. After surgery in Dec that numbness was gone. Numbness is staying about the same. No loss of control of bowel or bladder. In Dec of 2016 did have loss of control of bladder. Went to ER 8/8/17 and was given prescription for norco. No imaging was done. Is going to be seeing same person that did surgery. No injury that is aware of. After surgery had back pain but numbness was gone. Will occasionally get tingling to left toes but otherwise stays the same.     Has never had a pap. Periods are irregular. Is not sexually active, has never been     Has been using proactive for acne. Is not helping. Has acne to face and arms.     Problem list and histories reviewed & adjusted, as indicated.  Additional history: as  documented    Current Outpatient Prescriptions   Medication Sig Dispense Refill     Acetaminophen (TYLENOL PO)        IBUPROFEN PO        HYDROcodone-acetaminophen (NORCO) 5-325 MG per tablet Take 1-2 tablets by mouth every 4 hours as needed for moderate to severe pain maximum 6 tablet(s) per day 40 tablet 0     gabapentin (NEURONTIN) 100 MG capsule Take 1 capsule (100 mg) by mouth 3 times daily 270 capsule 1     LORazepam (ATIVAN) 0.5 MG tablet Take 1 tablet (0.5 mg) by mouth 2 times daily as needed for anxiety 30 tablet 1     prazosin (MINIPRESS) 2 MG capsule Take 1 capsule (2 mg) by mouth At Bedtime 90 capsule 1     benztropine (COGENTIN) 1 MG tablet Take 1 tablet (1 mg) by mouth 2 times daily as needed (restlessness due to medications) 90 tablet 1     lithium (ESKALITH/LITHOBID) 300 MG CR tablet Two each evening and one each morning. 270 tablet 1     OLANZapine (ZYPREXA) 10 MG tablet Take 1 tablet (10 mg) by mouth daily as needed For severe agitation or hallucinations or paranoia 30 tablet 1     risperiDONE (RISPERDAL) 0.25 MG tablet Take 1 tablet (0.25 mg) by mouth daily 30 tablet 1     propranolol (INDERAL) 10 MG tablet Take 1 tablet (10 mg) by mouth 2 times daily 180 tablet 1     eszopiclone (LUNESTA) 3 MG tablet Take 1 tablet (3 mg) by mouth At Bedtime 30 tablet 2     risperiDONE (RISPERDAL) 0.5 MG tablet Take 1 tablet (0.5 mg) by mouth daily At 5 PM 90 tablet 1     risperiDONE (RISPERDAL) 1 MG tablet Take 1 tablet (1 mg) by mouth At Bedtime 30 tablet 1     lithium (ESKALITH/LITHOBID) 300 MG CR tablet Take 1 tablet (300 mg) by mouth every morning (Patient not taking: Reported on 8/10/2017) 30 tablet 1     Allergies   Allergen Reactions     Adhesive Tape Hives     Prednisone Other (See Comments) and Hives     Suicidal ideation     Droperidol Anxiety       Reviewed and updated as needed this visit by clinical staff  Tobacco  Allergies  Meds  Med Hx  Surg Hx  Fam Hx  Soc Hx      Reviewed and updated as  needed this visit by Provider         ROS:  Review of Systems   Constitutional: Negative for chills, diaphoresis, fatigue and fever.   HENT: Negative for ear discharge, ear pain, hearing loss, rhinorrhea, sinus pressure and sore throat.    Eyes: Negative for discharge and itching.   Respiratory: Negative for cough, shortness of breath and wheezing.    Gastrointestinal: Negative for constipation, diarrhea and nausea.   Musculoskeletal: Positive for back pain (lower).        Acne     Skin: Negative for rash.   Neurological: Positive for numbness (left leg). Negative for dizziness, light-headedness and headaches.         OBJECTIVE:     /66 (BP Location: Right arm, Patient Position: Sitting, Cuff Size: Adult Large)  Pulse 72  Temp 98.3  F (36.8  C) (Tympanic)  Wt 215 lb 9.6 oz (97.8 kg)  BMI 34.8 kg/m2  Body mass index is 34.8 kg/(m^2).  Physical Exam   Constitutional: She appears well-developed and well-nourished.   Cardiovascular: Normal rate and regular rhythm.    Pulmonary/Chest: Effort normal and breath sounds normal.   Musculoskeletal:   Lower back pain with palpation   Monofilament test to left leg positive. Decreased sensation to up to mid left thigh.    Neurological: She is alert.   Skin: Skin is warm and dry.   Scattered pustules to face, arms and back       ASSESSMENT/PLAN:   1. Acute left-sided low back pain without sciatica  Call placed to [facility], Dr. Emmanuel to inquire weather repeat imagining is needed. Has had multiple MRI's, x-rays and CTs in the past  Office confirms that appointment with them is actually tomorrow at 8 AM  Will leave to Dr. Emmanuel discretion if imaging is needed  - HYDROcodone-acetaminophen (NORCO) 5-325 MG per tablet; Take 1-2 tablets by mouth every 4 hours as needed for moderate to severe pain maximum 6 tablet(s) per day  Dispense: 40 tablet; Refill: 0    2. Acne vulgaris  Informed that acne is likely due meds  Will refer to derm   Continue to use proactive    - DERMATOLOGY REFERRAL        [place] Cruz [facility]

## 2017-08-10 NOTE — NURSING NOTE
"Chief Complaint   Patient presents with     ER F/U       Initial /66 (BP Location: Right arm, Patient Position: Sitting, Cuff Size: Adult Large)  Pulse 72  Temp 98.3  F (36.8  C) (Tympanic)  Wt 215 lb 9.6 oz (97.8 kg)  BMI 34.8 kg/m2 Estimated body mass index is 34.8 kg/(m^2) as calculated from the following:    Height as of 6/30/17: 5' 6\" (1.676 m).    Weight as of this encounter: 215 lb 9.6 oz (97.8 kg).  Medication Reconciliation: complete     April ASHLYN Price      "

## 2017-08-11 ENCOUNTER — TRANSFERRED RECORDS (OUTPATIENT)
Dept: HEALTH INFORMATION MANAGEMENT | Facility: CLINIC | Age: 27
End: 2017-08-11

## 2017-08-11 ENCOUNTER — TELEPHONE (OUTPATIENT)
Dept: FAMILY MEDICINE | Facility: CLINIC | Age: 27
End: 2017-08-11

## 2017-08-11 NOTE — TELEPHONE ENCOUNTER
Patient called clinic. Updated that was to have appointment this AM with spine surgeon. Is aware that had appointment but was not able to go. Was able to reschedule appointment. Calling clinic at this time because numbness to left leg is worsened an now has lost control of bladder. Is currently living in a treatment facility. Nurse present. Recommend going immediately to closest Emergency Room. Reinforced that this is a medical emergency and needs to go immediately. Ayana and staff voice understanding.     Becki BARAHONA

## 2017-08-23 ENCOUNTER — TRANSFERRED RECORDS (OUTPATIENT)
Dept: HEALTH INFORMATION MANAGEMENT | Facility: CLINIC | Age: 27
End: 2017-08-23

## 2017-08-25 ENCOUNTER — MYC MEDICAL ADVICE (OUTPATIENT)
Dept: FAMILY MEDICINE | Facility: CLINIC | Age: 27
End: 2017-08-25

## 2017-08-31 ENCOUNTER — HOSPITAL ENCOUNTER (OUTPATIENT)
Dept: BEHAVIORAL HEALTH | Facility: CLINIC | Age: 27
End: 2017-08-31
Attending: SOCIAL WORKER
Payer: COMMERCIAL

## 2017-08-31 PROCEDURE — H0001 ALCOHOL AND/OR DRUG ASSESS: HCPCS

## 2017-08-31 ASSESSMENT — PATIENT HEALTH QUESTIONNAIRE - PHQ9
SUM OF ALL RESPONSES TO PHQ QUESTIONS 1-9: 0
5. POOR APPETITE OR OVEREATING: NOT AT ALL

## 2017-08-31 ASSESSMENT — ANXIETY QUESTIONNAIRES
GAD7 TOTAL SCORE: 0
3. WORRYING TOO MUCH ABOUT DIFFERENT THINGS: NOT AT ALL
1. FEELING NERVOUS, ANXIOUS, OR ON EDGE: NOT AT ALL
7. FEELING AFRAID AS IF SOMETHING AWFUL MIGHT HAPPEN: NOT AT ALL
IF YOU CHECKED OFF ANY PROBLEMS ON THIS QUESTIONNAIRE, HOW DIFFICULT HAVE THESE PROBLEMS MADE IT FOR YOU TO DO YOUR WORK, TAKE CARE OF THINGS AT HOME, OR GET ALONG WITH OTHER PEOPLE: NOT DIFFICULT AT ALL
2. NOT BEING ABLE TO STOP OR CONTROL WORRYING: NOT AT ALL
6. BECOMING EASILY ANNOYED OR IRRITABLE: NOT AT ALL
5. BEING SO RESTLESS THAT IT IS HARD TO SIT STILL: NOT AT ALL
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
## 2017-09-01 ASSESSMENT — ANXIETY QUESTIONNAIRES: GAD7 TOTAL SCORE: 0
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
## 2017-10-01 ENCOUNTER — HEALTH MAINTENANCE LETTER (OUTPATIENT)
Age: 27
End: 2017-10-01

## 2017-10-03 DIAGNOSIS — Z79.899 ENCOUNTER FOR LONG-TERM (CURRENT) USE OF MEDICATIONS: Primary | ICD-10-CM

## 2017-10-03 DIAGNOSIS — R53.83 FATIGUE, UNSPECIFIED TYPE: ICD-10-CM

## 2017-10-03 DIAGNOSIS — G47.00 INSOMNIA: ICD-10-CM

## 2017-10-03 LAB
ALBUMIN SERPL-MCNC: 3.7 G/DL (ref 3.4–5)
ALP SERPL-CCNC: 60 U/L (ref 40–150)
ALT SERPL W P-5'-P-CCNC: 38 U/L (ref 0–50)
ANION GAP SERPL CALCULATED.3IONS-SCNC: 8 MMOL/L (ref 3–14)
AST SERPL W P-5'-P-CCNC: 18 U/L (ref 0–45)
BILIRUB SERPL-MCNC: 0.3 MG/DL (ref 0.2–1.3)
BUN SERPL-MCNC: 15 MG/DL (ref 7–30)
CALCIUM SERPL-MCNC: 9.3 MG/DL (ref 8.5–10.1)
CHLORIDE SERPL-SCNC: 107 MMOL/L (ref 94–109)
CHOLEST SERPL-MCNC: 179 MG/DL
CO2 SERPL-SCNC: 24 MMOL/L (ref 20–32)
CREAT SERPL-MCNC: 0.76 MG/DL (ref 0.52–1.04)
ERYTHROCYTE [DISTWIDTH] IN BLOOD BY AUTOMATED COUNT: 14 % (ref 10–15)
GFR SERPL CREATININE-BSD FRML MDRD: >90 ML/MIN/1.7M2
GLUCOSE SERPL-MCNC: 110 MG/DL (ref 70–99)
HBA1C MFR BLD: 5.8 % (ref 4.3–6)
HCT VFR BLD AUTO: 40.6 % (ref 35–47)
HDLC SERPL-MCNC: 50 MG/DL
HGB BLD-MCNC: 13.5 G/DL (ref 11.7–15.7)
LDLC SERPL CALC-MCNC: 80 MG/DL
LITHIUM SERPL-SCNC: 0.7 MMOL/L (ref 0.6–1.2)
MCH RBC QN AUTO: 28.1 PG (ref 26.5–33)
MCHC RBC AUTO-ENTMCNC: 33.3 G/DL (ref 31.5–36.5)
MCV RBC AUTO: 85 FL (ref 78–100)
NONHDLC SERPL-MCNC: 129 MG/DL
PLATELET # BLD AUTO: 311 10E9/L (ref 150–450)
POTASSIUM SERPL-SCNC: 3.8 MMOL/L (ref 3.4–5.3)
PROLACTIN SERPL-MCNC: 65 UG/L (ref 3–27)
PROT SERPL-MCNC: 7.6 G/DL (ref 6.8–8.8)
RBC # BLD AUTO: 4.8 10E12/L (ref 3.8–5.2)
SODIUM SERPL-SCNC: 139 MMOL/L (ref 133–144)
T4 FREE SERPL-MCNC: 0.93 NG/DL (ref 0.76–1.46)
TRIGL SERPL-MCNC: 246 MG/DL
TSH SERPL DL<=0.005 MIU/L-ACNC: 2.04 MU/L (ref 0.4–4)
WBC # BLD AUTO: 10.5 10E9/L (ref 4–11)

## 2017-10-03 PROCEDURE — 80307 DRUG TEST PRSMV CHEM ANLYZR: CPT | Mod: 90 | Performed by: NURSE PRACTITIONER

## 2017-10-03 PROCEDURE — 80053 COMPREHEN METABOLIC PANEL: CPT | Performed by: FAMILY MEDICINE

## 2017-10-03 PROCEDURE — 84146 ASSAY OF PROLACTIN: CPT

## 2017-10-03 PROCEDURE — 80178 ASSAY OF LITHIUM: CPT

## 2017-10-03 PROCEDURE — 36415 COLL VENOUS BLD VENIPUNCTURE: CPT | Performed by: FAMILY MEDICINE

## 2017-10-03 PROCEDURE — 85027 COMPLETE CBC AUTOMATED: CPT | Performed by: FAMILY MEDICINE

## 2017-10-03 PROCEDURE — 99000 SPECIMEN HANDLING OFFICE-LAB: CPT | Performed by: NURSE PRACTITIONER

## 2017-10-03 PROCEDURE — 84443 ASSAY THYROID STIM HORMONE: CPT | Performed by: FAMILY MEDICINE

## 2017-10-03 PROCEDURE — 80061 LIPID PANEL: CPT

## 2017-10-03 PROCEDURE — 84439 ASSAY OF FREE THYROXINE: CPT

## 2017-10-03 PROCEDURE — 83036 HEMOGLOBIN GLYCOSYLATED A1C: CPT

## 2017-10-03 PROCEDURE — 80171 DRUG SCREEN QUANT GABAPENTIN: CPT | Mod: 90 | Performed by: NURSE PRACTITIONER

## 2017-10-09 LAB
AMPHETAMINES SPEC-MCNC: NEGATIVE NG/ML
APAP BLD-MCNC: NEGATIVE UG/ML
BARBITURATES SPEC-MCNC: NEGATIVE UG/ML
BENZODIAZ SPEC-MCNC: NEGATIVE NG/ML
BUPRENORPHINE SERPLBLD-MCNC: NEGATIVE NG/ML
CARBOXYTHC BLD-MCNC: NEGATIVE NG/ML
CARISOPRODOL IA: NEGATIVE UG/ML
COCAINE METABOLITE IA: NEGATIVE NG/ML
DECLARED MEDICATIONS: NORMAL
ETHANOL BLD-MCNC: NEGATIVE GM/DL
FENTANYL IA: NEGATIVE NG/ML
GABAPENTIN CONFIRM: POSITIVE
GABAPENTIN IA: NORMAL UG/ML
GABAPENTIN: 1.7 UG/ML
MEPERIDINE SERPLBLD-MCNC: NEGATIVE NG/ML
METHADONE BLD-MCNC: NEGATIVE NG/ML
OPIATES SPEC-MCNC: NEGATIVE NG/ML
OTHER DRUGS DETECTED: NORMAL
OXYCODONE SERPLBLD SCN-MCNC: NEGATIVE NG/ML
PCP SPEC-MCNC: NEGATIVE NG/ML
PROPOXYPH SPEC-MCNC: NEGATIVE NG/ML
TRAMADOL BLD-MCNC: NEGATIVE NG/ML

## 2017-10-12 NOTE — PROGRESS NOTES
Ms. Prasad,    Your A1C was within goal.  Continue and follow up as planned.    Your TSH indicates that your thyroid function is currently in balance.  No additional testing is necessary for a year or unless you develop symptoms of over or underactive thyroid.    Your blood count was normal with no evidence of anemia or white count elevation.     The urine tox screen was entirely normal.     Your drug levels were normal.      Your prolactin level was high which can be due to medications.     Please contact the clinic if you have additional questions.  Thank you.    Sincerely,    Brian Ragsdale MD

## 2017-11-09 ENCOUNTER — TELEPHONE (OUTPATIENT)
Dept: FAMILY MEDICINE | Facility: CLINIC | Age: 27
End: 2017-11-09

## 2017-11-09 ENCOUNTER — OFFICE VISIT (OUTPATIENT)
Dept: FAMILY MEDICINE | Facility: CLINIC | Age: 27
End: 2017-11-09
Payer: COMMERCIAL

## 2017-11-09 VITALS
TEMPERATURE: 98.8 F | HEART RATE: 67 BPM | WEIGHT: 215 LBS | SYSTOLIC BLOOD PRESSURE: 124 MMHG | BODY MASS INDEX: 34.55 KG/M2 | DIASTOLIC BLOOD PRESSURE: 88 MMHG | OXYGEN SATURATION: 99 % | HEIGHT: 66 IN

## 2017-11-09 DIAGNOSIS — F90.0 ATTENTION DEFICIT HYPERACTIVITY DISORDER (ADHD), PREDOMINANTLY INATTENTIVE TYPE: Primary | ICD-10-CM

## 2017-11-09 PROCEDURE — 99213 OFFICE O/P EST LOW 20 MIN: CPT | Performed by: NURSE PRACTITIONER

## 2017-11-09 RX ORDER — LISDEXAMFETAMINE DIMESYLATE 20 MG/1
20 CAPSULE ORAL EVERY MORNING
Qty: 30 CAPSULE | Refills: 0 | Status: SHIPPED | OUTPATIENT
Start: 2017-11-09 | End: 2017-11-29

## 2017-11-09 ASSESSMENT — ENCOUNTER SYMPTOMS
FATIGUE: 0
NAUSEA: 0
DIZZINESS: 0
SINUS PRESSURE: 0
DIAPHORESIS: 0
EYE ITCHING: 0
SORE THROAT: 0
HEADACHES: 0
SLEEP DISTURBANCE: 0
WHEEZING: 0
NERVOUS/ANXIOUS: 0
FEVER: 0
CONSTIPATION: 0
COUGH: 0
EYE DISCHARGE: 0
RHINORRHEA: 0
DIARRHEA: 0
SHORTNESS OF BREATH: 0
LIGHT-HEADEDNESS: 0
DECREASED CONCENTRATION: 1
CHILLS: 0

## 2017-11-09 NOTE — TELEPHONE ENCOUNTER
The following medication needs a Prior Authorization to be processed through the patient's insurance:   ?   Pt Name: Ayana Prasad   : 1990  Medication/Dose: Vyvanse 20 mg    NDC: [id number]   Quantity: 30   SiQD AM   Prescription #: [id number]   Reason for Rejection: Product not on formulary   Third Party: [employer]   Telephone: [phone]   Patient ID: [id number]   OR PLEASE SEND ALTERNATIVE  ?   Pharmacy NPI: [id number]   Pharmacy Phone: [phone]  Pharmacy Fax: [phone]      Please update us when you have initiated the PA with the insurance. Thank you!!!                       Thank You,  Maco Sanford, [facility]  On behalf of [facility]

## 2017-11-09 NOTE — LETTER
[address] [address]  [phone]      November 27, 2017             To Whom it May Concern,     Ayana Prasad recently received a formulary denial of her Vyvanse prescription.  I believe that this medication is appropriate and coverage should be reconsidered for several reasons.  The first is that she has not had adequate response to mixed amphetamine salts.  The second is that she has a dual diagnosis of bipolar disorder with haven.  Her risk for exacerbations and poor control with significant consequences is higher and this is why psychiatry recommended using Vyvanse.  Please reconsider covering this medication     Sincerely,    Brian Ragsdale MD

## 2017-11-09 NOTE — MR AVS SNAPSHOT
"              After Visit Summary   11/9/2017    Ayana Prasad    MRN: [id number]           Patient Information     Date Of Birth          1990        Visit Information        Provider Department      11/9/2017 1:40 PM Becki Avery APRN [facility]        Today's Diagnoses     Attention deficit hyperactivity disorder (ADHD), predominantly inattentive type    -  1      Care Instructions    Plan to follow up in 1 month of sooner if needed           Follow-ups after your visit        Who to contact     Normal or non-critical lab and imaging results will be communicated to you by Fixetudehart, letter or phone within 4 business days after the clinic has received the results. If you do not hear from us within 7 days, please contact the clinic through [facility] [employer] or phone. If you have a critical or abnormal lab result, we will notify you by phone as soon as possible.  Submit refill requests through [employer] or call your pharmacy and they will forward the refill request to us. Please allow 3 business days for your refill to be completed.          If you need to speak with a  for additional information , please call: [phone]           Additional Information About Your Visit        MyChart Information     [employer] gives you secure access to your electronic health record. If you see a primary care provider, you can also send messages to your care team and make appointments. If you have questions, please call your primary care clinic.  If you do not have a primary care provider, please call [phone] and they will assist you.        Care EveryWhere ID     This is your Care EveryWhere ID. This could be used by other organizations to access your [facility] medical records  [id number]        Your Vitals Were     Pulse Temperature Height Pulse Oximetry BMI (Body Mass Index)       67 98.8  F (37.1  C) (Tympanic) 5' 6\" (1.676 m) 99% 34.7 kg/m2        Blood Pressure from Last 3 " Encounters:   11/09/17 124/88   08/10/17 100/66   06/30/17 136/84    Weight from Last 3 Encounters:   11/09/17 215 lb (97.5 kg)   08/10/17 215 lb 9.6 oz (97.8 kg)   06/30/17 211 lb 6.4 oz (95.9 kg)              Today, you had the following     No orders found for display         Today's Medication Changes          These changes are accurate as of: 11/9/17  2:03 PM.  If you have any questions, ask your nurse or doctor.               Start taking these medicines.        Dose/Directions    lisdexamfetamine 20 MG capsule   Commonly known as:  VYVANSE   Used for:  Attention deficit hyperactivity disorder (ADHD), predominantly inattentive type   Started by:  Becki Avery APRN CNP        Dose:  20 mg   Take 1 capsule (20 mg) by mouth every morning   Quantity:  30 capsule   Refills:  0            Where to get your medicines      Some of these will need a paper prescription and others can be bought over the counter.  Ask your nurse if you have questions.     Bring a paper prescription for each of these medications     lisdexamfetamine 20 MG capsule                Primary Care Provider Office Phone # Fax #    Chandana Ragsdale [phone] [id number] [place] [id number]        Equal Access to Services     PIPPA KOCH AH: Hadii dale ku hadasho Soomaali, waaxda luqadaha, qaybta kaalmada adeegyada, waxay idiin hayminhn abhijeet elizabeth. So [facility] [phone].    ATENCIÓN: Si habla español, tiene a xiong disposición servicios gratuitos de asistencia lingüística. Llame al [phone].    We comply with applicable federal civil rights laws and [place] laws. We do not discriminate on the basis of race, color, national origin, age, disability, sex, sexual orientation, or gender identity.            Thank you!     Thank you for choosing [facility]  for your care. Our goal is always to provide you with excellent care. Hearing back from our patients is one way we can  continue to improve our services. Please take a few minutes to complete the written survey that you may receive in the mail after your visit with us. Thank you!             Your Updated Medication List - Protect others around you: Learn how to safely use, store and throw away your medicines at www.disposemymeds.org.          This list is accurate as of: 11/9/17  2:03 PM.  Always use your most recent med list.                   Brand Name Dispense Instructions for use Diagnosis    benztropine 1 MG tablet    COGENTIN    90 tablet    Take 1 tablet (1 mg) by mouth 2 times daily as needed (restlessness due to medications)    Schizoaffective disorder, bipolar type (H)       eszopiclone 3 MG tablet    LUNESTA    30 tablet    Take 1 tablet (3 mg) by mouth At Bedtime    Primary insomnia       gabapentin 100 MG capsule    NEURONTIN    270 capsule    Take 1 capsule (100 mg) by mouth 3 times daily    Anxiety, Schizoaffective disorder, bipolar type (H)       HYDROcodone-acetaminophen 5-325 MG per tablet    NORCO    40 tablet    Take 1-2 tablets by mouth every 4 hours as needed for moderate to severe pain maximum 6 tablet(s) per day    Acute left-sided low back pain without sciatica       IBUPROFEN PO           lisdexamfetamine 20 MG capsule    VYVANSE    30 capsule    Take 1 capsule (20 mg) by mouth every morning    Attention deficit hyperactivity disorder (ADHD), predominantly inattentive type       * lithium 300 MG CR tablet    ESKALITH/LITHOBID    30 tablet    Take 1 tablet (300 mg) by mouth every morning    Schizoaffective disorder, bipolar type (H)       * lithium 300 MG CR tablet    ESKALITH/LITHOBID    270 tablet    Two each evening and one each morning.    Schizoaffective disorder, bipolar type (H)       LORazepam 0.5 MG tablet    ATIVAN    30 tablet    Take 1 tablet (0.5 mg) by mouth 2 times daily as needed for anxiety    Anxiety, Schizoaffective disorder, bipolar type (H)       OLANZapine 10 MG tablet    zyPREXA    30  tablet    Take 1 tablet (10 mg) by mouth daily as needed For severe agitation or hallucinations or paranoia    Schizoaffective disorder, bipolar type (H)       prazosin 2 MG capsule    MINIPRESS    90 capsule    Take 1 capsule (2 mg) by mouth At Bedtime    PTSD (post-traumatic stress disorder)       propranolol 10 MG tablet    INDERAL    180 tablet    Take 1 tablet (10 mg) by mouth 2 times daily    Schizoaffective disorder, bipolar type (H), PTSD (post-traumatic stress disorder), Anxiety       * risperiDONE 0.25 MG tablet    risperDAL    30 tablet    Take 1 tablet (0.25 mg) by mouth daily    Schizoaffective disorder, bipolar type (H)       * risperiDONE 0.5 MG tablet    risperDAL    90 tablet    Take 1 tablet (0.5 mg) by mouth daily At 5 PM    Schizoaffective disorder, bipolar type (H)       * risperiDONE 1 MG tablet    risperDAL    30 tablet    Take 1 tablet (1 mg) by mouth At Bedtime    Schizoaffective disorder, bipolar type (H)       TYLENOL PO           * Notice:  This list has 5 medication(s) that are the same as other medications prescribed for you. Read the directions carefully, and ask your doctor or other care provider to review them with you.

## 2017-11-09 NOTE — PROGRESS NOTES
SUBJECTIVE:   Ayana Prasad is a 27 year old female who presents to clinic today for the following health issues:      ADHD follow up- Has been having worse concentration problems for the past couple months    Amount of exercise or physical activity: 6-7 days/week for an average of 30-45 minutes    Problems taking medications regularly: No    Medication side effects: none    Diet: regular (no restrictions)                                                                                                      Some-                                                                        Never   Rarely      times       Often  Very Often  1. How often do you have trouble wrapping up the final details of a project,  once the challenging parts have been done?     X   2. How often do you have difficulty getting things in order when you have to do a task that requires organization?    X    3. How often do you have problems remembering appointments or obligations?    X    4. When you have a task that requires a lot of thought, how often do you avoid or delay getting started?     X   5. How often do you fidget or squirm with your hands or feet when you have to sit down for a long time?     X   6. How often do you feel overly active and compelled to do things, like you were driven by a motor? X         Part A                                                        7. How often do you make careless mistakes when you have to work on a boring or difficult project? X       8. How often do you have difficulty keeping your attention when you are doing boring or repetitive work?    X    9. How often do you have difficulty concentrating on what people say to you, even when they are speaking to you directly?  X      10. How often do you misplace or have difficulty finding things at home or at work?   X     11. How often are you distracted by activity or noise around you? X       12. How often do you leave your seat in meetings or other  situations in which you are expected to remain seated?     X     13. How often do you feel restless or fidgety?       X   14. How often do you have difficulty unwinding and relaxing when you have time to yourself? X       15. How often do you find yourself talking too much when you are in social situations? X       16. When you're in a conversation, how often do you find yourself finishing the sentences of the people you are talking to, before they can finish them themselves? X       17. How often do you have difficulty waiting your turn in situations when turn-taking is required? X       18. How often do you interrupt others when they are busy? X           Has been having a harder time concentrating for the last 2 months. Is not currently seeing mental health. Current meds that is on has been on them since May. Is taking gabapentin for anxiety and that helps. Taking minpress and lunesta to help sleep. Propranolol for anxiety. Taking lithium 300 mg in AM and 600 mg PM. Has not been using any illicit drugs. Has been adderall and strattera in the past for ADHD. Most of trouble is in school and group. Feels like needs to get up and move and feels like is not paying attention to what's going on. Feels like anxiety is well controlled at this time. Is sleeping ok at night time. Is going to be in Outpt treatment until March. Plans to continue to follow up after that. No fluttering in chest. No movements that cant control. No shortness of breath. Goes to the gym every AM. Hs appointment  With mental health in Jan.       Problem list and histories reviewed & adjusted, as indicated.  Additional history: as documented    Current Outpatient Prescriptions   Medication Sig Dispense Refill     lisdexamfetamine (VYVANSE) 20 MG capsule Take 1 capsule (20 mg) by mouth every morning 30 capsule 0     gabapentin (NEURONTIN) 100 MG capsule Take 1 capsule (100 mg) by mouth 3 times daily 270 capsule 1     prazosin (MINIPRESS) 2 MG capsule  Take 1 capsule (2 mg) by mouth At Bedtime 90 capsule 1     propranolol (INDERAL) 10 MG tablet Take 1 tablet (10 mg) by mouth 2 times daily 180 tablet 1     eszopiclone (LUNESTA) 3 MG tablet Take 1 tablet (3 mg) by mouth At Bedtime 30 tablet 2     lithium (ESKALITH/LITHOBID) 300 MG CR tablet Take 1 tablet (300 mg) by mouth every morning 30 tablet 1     Acetaminophen (TYLENOL PO)        IBUPROFEN PO        HYDROcodone-acetaminophen (NORCO) 5-325 MG per tablet Take 1-2 tablets by mouth every 4 hours as needed for moderate to severe pain maximum 6 tablet(s) per day (Patient not taking: Reported on 11/9/2017) 40 tablet 0     LORazepam (ATIVAN) 0.5 MG tablet Take 1 tablet (0.5 mg) by mouth 2 times daily as needed for anxiety (Patient not taking: Reported on 11/9/2017) 30 tablet 1     benztropine (COGENTIN) 1 MG tablet Take 1 tablet (1 mg) by mouth 2 times daily as needed (restlessness due to medications) (Patient not taking: Reported on 11/9/2017) 90 tablet 1     lithium (ESKALITH/LITHOBID) 300 MG CR tablet Two each evening and one each morning. 270 tablet 1     OLANZapine (ZYPREXA) 10 MG tablet Take 1 tablet (10 mg) by mouth daily as needed For severe agitation or hallucinations or paranoia (Patient not taking: Reported on 11/9/2017) 30 tablet 1     risperiDONE (RISPERDAL) 0.25 MG tablet Take 1 tablet (0.25 mg) by mouth daily (Patient not taking: Reported on 11/9/2017) 30 tablet 1     risperiDONE (RISPERDAL) 0.5 MG tablet Take 1 tablet (0.5 mg) by mouth daily At 5 PM (Patient not taking: Reported on 11/9/2017) 90 tablet 1     risperiDONE (RISPERDAL) 1 MG tablet Take 1 tablet (1 mg) by mouth At Bedtime (Patient not taking: Reported on 11/9/2017) 30 tablet 1     Allergies   Allergen Reactions     Adhesive Tape Hives     Prednisone Other (See Comments) and Hives     Suicidal ideation     Droperidol Anxiety       Reviewed and updated as needed this visit by clinical staffTobacco  Allergies  Meds  Med Hx  Surg Hx  Fam Hx  " Soc Hx      Reviewed and updated as needed this visit by Provider         ROS:  Review of Systems   Constitutional: Negative for chills, diaphoresis, fatigue and fever.   HENT: Negative for ear discharge, ear pain, hearing loss, rhinorrhea, sinus pressure and sore throat.    Eyes: Negative for discharge and itching.   Respiratory: Negative for cough, shortness of breath and wheezing.    Gastrointestinal: Negative for constipation, diarrhea and nausea.   Skin: Negative for rash.   Neurological: Negative for dizziness, light-headedness and headaches.   Psychiatric/Behavioral: Positive for decreased concentration. Negative for sleep disturbance and suicidal ideas. The patient is not nervous/anxious (well controlled on current). Hyperactive: feels like needs to walk.        OBJECTIVE:     /88 (BP Location: Right arm, Cuff Size: Adult Large)  Pulse 67  Temp 98.8  F (37.1  C) (Tympanic)  Ht 5' 6\" (1.676 m)  Wt 215 lb (97.5 kg)  SpO2 99%  BMI 34.7 kg/m2  Body mass index is 34.7 kg/(m^2).  Physical Exam  No PE completed, visit was 100 % discussion    ASSESSMENT/PLAN:   1. Attention deficit hyperactivity disorder (ADHD), predominantly inattentive type  Educated on use of med  Educated regarding possible side effects to watch for and when would need to stop med  Plan to follow up in 1 months or sooner if needed   - lisdexamfetamine (VYVANSE) 20 MG capsule; Take 1 capsule (20 mg) by mouth every morning  Dispense: 30 capsule; Refill: 0    During this visit greater than 100 % of the 17 minutes for this appointment was spent in counseling and/or coordinating care of above stated issues.     BROOKLYN Cruz [facility]  "

## 2017-11-16 NOTE — TELEPHONE ENCOUNTER
Retrieved PA notice for Vyvanse 20mg from the [facility]  Pharmacy Rejection Note: MR Product Not On Formulary    Sig: Take 1 capsule (20 mg) by mouth every morning  Disp: 30 per 30  TLAHA: no    No previous PA on file for this med.    Dx: Attention deficit hyperactivity disorder (ADHD), predominantly inattentive type [F90.0]  Rationale: Tx of Attention deficit hyperactivity disorder (ADHD), predominantly inattentive type [F90.0]  Adderall not effective, Concerta Better    Provided Ins: [employer] [place] [employer]  Provided Ins ID: [id number]  Provided Ins Phone # [phone]    PA submitted to [facility] [place] via CoverMyMeds, Keycode [id number]    Ham Patiño RT (r)  [facility]

## 2017-11-21 NOTE — TELEPHONE ENCOUNTER
Received response from [facility] [place]    Response provided to the pharmacy, routed to the provider    Note: Did list Adderall and Concerta on the request          Ham Patiño RT (r)  [facility]

## 2017-11-22 NOTE — TELEPHONE ENCOUNTER
According to our records, patient has tried and failed amphetamine/dextroamphtamine XR as well as methylphenidate extended release.  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  The Vyvanse is being recommended by her psychiatrist and changing her regimen is contraindicated at this point.

## 2017-11-24 NOTE — TELEPHONE ENCOUNTER
Patient called and needs a desision made, she needs her medication so if an appeal is going to take time she wants Gena to pick one of the other ones that the insurance recommended.    Faustino [place]

## 2017-11-27 NOTE — TELEPHONE ENCOUNTER
Received an Appeal from the provider, Faxed to the [facility] [place] , [place]-[facility], Successful fax report.    Ham Patiño RT (r)  [facility]

## 2017-11-29 ENCOUNTER — TELEPHONE (OUTPATIENT)
Dept: FAMILY MEDICINE | Facility: CLINIC | Age: 27
End: 2017-11-29

## 2017-11-29 NOTE — TELEPHONE ENCOUNTER
Can you please call Ayana? Please let her know that the prescription for Vyvanse requires prior authorization. Her insurance company is wanting her to try at least 3 other stimulant medications prior to approving the Vyvanse. I am not comfortable prescribing those due to past history of drug abuse. I would recommend that she discusses her ADHD with her mental health provider so the best decision can be made about which med to start her on due to her current mental health illness and previous history.     Thanks,  BROOKLYN Edwards CNP

## 2017-11-29 NOTE — TELEPHONE ENCOUNTER
Ml for pt to call back   ANTHONY Evans RN/Faustino Pearson  Spoke with patient  Advised per Gena note, ok with, does not see a  counselor , advised to call and make an appt referral in chart number given , pt said she would call ,call back as need   ANTHONY Evans RN/Faustino Pearson

## 2017-12-19 ENCOUNTER — OFFICE VISIT (OUTPATIENT)
Dept: PSYCHIATRY | Facility: CLINIC | Age: 27
End: 2017-12-19
Attending: FAMILY MEDICINE
Payer: COMMERCIAL

## 2017-12-19 VITALS
SYSTOLIC BLOOD PRESSURE: 104 MMHG | BODY MASS INDEX: 34.22 KG/M2 | DIASTOLIC BLOOD PRESSURE: 69 MMHG | TEMPERATURE: 97.4 F | HEART RATE: 85 BPM | WEIGHT: 212 LBS

## 2017-12-19 DIAGNOSIS — F41.9 ANXIETY: ICD-10-CM

## 2017-12-19 DIAGNOSIS — F43.10 PTSD (POST-TRAUMATIC STRESS DISORDER): ICD-10-CM

## 2017-12-19 DIAGNOSIS — F51.01 PRIMARY INSOMNIA: ICD-10-CM

## 2017-12-19 DIAGNOSIS — F90.0 ADHD (ATTENTION DEFICIT HYPERACTIVITY DISORDER), INATTENTIVE TYPE: Primary | ICD-10-CM

## 2017-12-19 DIAGNOSIS — F25.0 SCHIZOAFFECTIVE DISORDER, BIPOLAR TYPE (H): ICD-10-CM

## 2017-12-19 PROCEDURE — 90792 PSYCH DIAG EVAL W/MED SRVCS: CPT | Performed by: CLINICAL NURSE SPECIALIST

## 2017-12-19 RX ORDER — ESZOPICLONE 3 MG/1
3 TABLET, FILM COATED ORAL AT BEDTIME
Qty: 30 TABLET | Refills: 2 | Status: ON HOLD | OUTPATIENT
Start: 2017-12-19 | End: 2018-02-28

## 2017-12-19 RX ORDER — ARIPIPRAZOLE 10 MG/1
10 TABLET ORAL DAILY
Qty: 90 TABLET | Refills: 1 | Status: ON HOLD | OUTPATIENT
Start: 2017-12-19 | End: 2018-02-28

## 2017-12-19 RX ORDER — PRAZOSIN HYDROCHLORIDE 2 MG/1
2 CAPSULE ORAL AT BEDTIME
Qty: 90 CAPSULE | Refills: 1 | Status: ON HOLD | OUTPATIENT
Start: 2017-12-19 | End: 2018-02-28

## 2017-12-19 RX ORDER — PROPRANOLOL HYDROCHLORIDE 10 MG/1
10 TABLET ORAL 2 TIMES DAILY
Qty: 180 TABLET | Refills: 1 | Status: ON HOLD | OUTPATIENT
Start: 2017-12-19 | End: 2018-02-28

## 2017-12-19 RX ORDER — ARIPIPRAZOLE 10 MG/1
TABLET ORAL
COMMUNITY
Start: 2017-10-24 | End: 2017-12-19

## 2017-12-19 RX ORDER — GABAPENTIN 100 MG/1
100 CAPSULE ORAL 3 TIMES DAILY
Qty: 270 CAPSULE | Refills: 1 | Status: SHIPPED | OUTPATIENT
Start: 2017-12-19 | End: 2018-02-08

## 2017-12-19 RX ORDER — LITHIUM CARBONATE 300 MG/1
TABLET, FILM COATED, EXTENDED RELEASE ORAL
Qty: 270 TABLET | Refills: 1 | Status: ON HOLD | OUTPATIENT
Start: 2017-12-19 | End: 2018-02-28

## 2017-12-19 ASSESSMENT — ANXIETY QUESTIONNAIRES
7. FEELING AFRAID AS IF SOMETHING AWFUL MIGHT HAPPEN: SEVERAL DAYS
3. WORRYING TOO MUCH ABOUT DIFFERENT THINGS: MORE THAN HALF THE DAYS
IF YOU CHECKED OFF ANY PROBLEMS ON THIS QUESTIONNAIRE, HOW DIFFICULT HAVE THESE PROBLEMS MADE IT FOR YOU TO DO YOUR WORK, TAKE CARE OF THINGS AT HOME, OR GET ALONG WITH OTHER PEOPLE: SOMEWHAT DIFFICULT
2. NOT BEING ABLE TO STOP OR CONTROL WORRYING: SEVERAL DAYS
1. FEELING NERVOUS, ANXIOUS, OR ON EDGE: MORE THAN HALF THE DAYS
6. BECOMING EASILY ANNOYED OR IRRITABLE: NOT AT ALL
4. TROUBLE RELAXING: MORE THAN HALF THE DAYS
GAD7 TOTAL SCORE: 8
5. BEING SO RESTLESS THAT IT IS HARD TO SIT STILL: NOT AT ALL

## 2017-12-19 ASSESSMENT — PATIENT HEALTH QUESTIONNAIRE - PHQ9: SUM OF ALL RESPONSES TO PHQ QUESTIONS 1-9: 4

## 2017-12-19 NOTE — NURSING NOTE
"Chief Complaint   Patient presents with     Consult       Initial /69 (BP Location: Left arm, Patient Position: Sitting, Cuff Size: Adult Large)  Pulse 85  Temp 97.4  F (36.3  C) (Tympanic)  Wt 212 lb (96.2 kg)  BMI 34.22 kg/m2 Estimated body mass index is 34.22 kg/(m^2) as calculated from the following:    Height as of 11/9/17: 5' 6\" (1.676 m).    Weight as of this encounter: 212 lb (96.2 kg).  Medication Reconciliation: complete    "

## 2017-12-19 NOTE — PATIENT INSTRUCTIONS
Treatment Plan:  Continue lithium 300 mg in the morning and 600 mg in the evening, aripiprazole (Abilify) 10 mg daily, prazosin 2 mg at bedtime, propranolol 10 mg twice daily, Lunesta 3 mg at bedtime as needed, and gabapentin 100 mg three times daily.     Complete ADHD testing.     Follow up after testing.     - Recommend patient discuss medications with their pharmacist. Risks and benefits of medications discussed, including side effect profile.   - Safety plan was reviewed; to the ER as needed or call after hours crisis line; [phone]  - Education and counseling was done regarding use of medications, psychotherapy options  - Call [phone] for appointment or to speak to a nurse.   -Office hours: Monday through Thursday 8:00 am to 4:30 pm; Friday 8:00 am to Noon.   - Patient was given a copy of this Treatment Plan today.

## 2017-12-19 NOTE — MR AVS SNAPSHOT
After Visit Summary   12/19/2017    Ayana Prasad    MRN: [id number]           Patient Information     Date Of Birth          1990        Visit Information        Provider Department      12/19/2017 7:45 AM Lesa Matthew APRN [facility]        Today's Diagnoses     ADHD (attention deficit hyperactivity disorder), inattentive type    -  1    Schizoaffective disorder, bipolar type (H)        PTSD (post-traumatic stress disorder)        Anxiety        Primary insomnia          Care Instructions    Treatment Plan:  Continue lithium 300 mg in the morning and 600 mg in the evening, aripiprazole (Abilify) 10 mg daily, prazosin 2 mg at bedtime, propranolol 10 mg twice daily, Lunesta 3 mg at bedtime as needed, and gabapentin 100 mg three times daily.     Complete ADHD testing.     Follow up after testing.     - Recommend patient discuss medications with their pharmacist. Risks and benefits of medications discussed, including side effect profile.   - Safety plan was reviewed; to the ER as needed or call after hours crisis line; [phone]  - Education and counseling was done regarding use of medications, psychotherapy options  - Call [phone] for appointment or to speak to a nurse.   -Office hours: Monday through Thursday 8:00 am to 4:30 pm; Friday 8:00 am to Noon.   - Patient was given a copy of this Treatment Plan today.             Follow-ups after your visit        Additional Services     MENTAL HEALTH REFERRAL  - Adult; Assessments and Testing; ADHD; [facility]: [facility] [phone]; We will contact you to schedule the appointment or please call with any questions       All scheduling is subject to the client's specific insurance plan & benefits, provider/location availability, and provider clinical specialities.  Please arrive 15 minutes early for your first appointment and bring your completed paperwork.    Please be aware that coverage of these  services is subject to the terms and limitations of your health insurance plan.  Call member services at your health plan with any benefit or coverage questions.                            Who to contact     If you have questions or need follow up information about today's clinic visit or your schedule please contact [facility] directly at [phone].  Normal or non-critical lab and imaging results will be communicated to you by MyChart, letter or phone within 4 business days after the clinic has received the results. If you do not hear from us within 7 days, please contact the clinic through [employer] or phone. If you have a critical or abnormal lab result, we will notify you by phone as soon as possible.  Submit refill requests through [employer] or call your pharmacy and they will forward the refill request to us. Please allow 3 business days for your refill to be completed.          Additional Information About Your Visit        MyChart Information     [employer] gives you secure access to your electronic health record. If you see a primary care provider, you can also send messages to your care team and make appointments. If you have questions, please call your primary care clinic.  If you do not have a primary care provider, please call [phone] and they will assist you.        Care EveryWhere ID     This is your Care EveryWhere ID. This could be used by other organizations to access your [facility] medical records  [id number]        Your Vitals Were     Pulse Temperature BMI (Body Mass Index)             85 97.4  F (36.3  C) (Tympanic) 34.22 kg/m2          Blood Pressure from Last 3 Encounters:   12/19/17 104/69   11/09/17 124/88   08/10/17 100/66    Weight from Last 3 Encounters:   12/19/17 212 lb (96.2 kg)   11/09/17 215 lb (97.5 kg)   08/10/17 215 lb 9.6 oz (97.8 kg)              We Performed the Following     MENTAL HEALTH REFERRAL  - Adult; Assessments and Testing; ADHD; [facility]: [place]  [facility] [phone]; We will contact you to schedule the appointment or please call with any questions          Today's Medication Changes          These changes are accurate as of: 12/19/17  8:44 AM.  If you have any questions, ask your nurse or doctor.               These medicines have changed or have updated prescriptions.        Dose/Directions    ARIPiprazole 10 MG tablet   Commonly known as:  ABILIFY   This may have changed:    - how much to take  - how to take this  - when to take this   Used for:  Schizoaffective disorder, bipolar type (H)   Changed by:  Lesa Matthew APRN CNS        Dose:  10 mg   Take 1 tablet (10 mg) by mouth daily   Quantity:  90 tablet   Refills:  1       lithium 300 MG CR tablet   Commonly known as:  ESKALITH/LITHOBID   This may have changed:  Another medication with the same name was removed. Continue taking this medication, and follow the directions you see here.   Used for:  Schizoaffective disorder, bipolar type (H)   Changed by:  Lesa Matthew APRN CNS        Two each evening and one each morning.   Quantity:  270 tablet   Refills:  1         Stop taking these medicines if you haven't already. Please contact your care team if you have questions.     HYDROcodone-acetaminophen 5-325 MG per tablet   Commonly known as:  NORCO   Stopped by:  Lesa Matthew APRN CNS           IBUPROFEN PO   Stopped by:  Lesa Matthew APRN CNS           risperiDONE 0.25 MG tablet   Commonly known as:  risperDAL   Stopped by:  Lesa Matthew APRN CNS           risperiDONE 0.5 MG tablet   Commonly known as:  risperDAL   Stopped by:  Lesa Matthew APRN CNS           risperiDONE 1 MG tablet   Commonly known as:  risperDAL   Stopped by:  Lesa Matthew APRN CNS           TYLENOL PO   Stopped by:  Lesa Matthew APRN CNS                Where to get your medicines      These medications were sent to  [facility] [place], [place] - [address] [address] [facility] [address]  [address] [place] [address]     Phone:  [phone]     ARIPiprazole 10 MG tablet    gabapentin 100 MG capsule    lithium 300 MG CR tablet    prazosin 2 MG capsule    propranolol 10 MG tablet         Some of these will need a paper prescription and others can be bought over the counter.  Ask your nurse if you have questions.     Bring a paper prescription for each of these medications     eszopiclone 3 MG tablet                Primary Care Provider Office Phone # Fax #    Becki Dowdmaribeth Avery, APRN [phone]       [facility] [address]   [facility] [address]        Equal Access to Services     PIPPA KOCH : Hadii dale abrahamo Sohéctor, waaxda luqadaha, qaybta kaalmada adeegyada, fozia roca . So [facility] [phone].    ATENCIÓN: Si habla español, tiene a xiong disposición servicios gratuitos de asistencia lingüística. Llame al [phone].    We comply with applicable federal civil rights laws and [place] laws. We do not discriminate on the basis of race, color, national origin, age, disability, sex, sexual orientation, or gender identity.            Thank you!     Thank you for choosing [facility]  for your care. Our goal is always to provide you with excellent care. Hearing back from our patients is one way we can continue to improve our services. Please take a few minutes to complete the written survey that you may receive in the mail after your visit with us. Thank you!             Your Updated Medication List - Protect others around you: Learn how to safely use, store and throw away your medicines at www.disposemymeds.org.          This list is accurate as of: 12/19/17  8:44 AM.  Always use your most recent med list.                   Brand Name Dispense Instructions for use Diagnosis    ARIPiprazole 10 MG tablet    ABILIFY    90 tablet    Take 1  tablet (10 mg) by mouth daily    Schizoaffective disorder, bipolar type (H)       eszopiclone 3 MG tablet    LUNESTA    30 tablet    Take 1 tablet (3 mg) by mouth At Bedtime    Primary insomnia       gabapentin 100 MG capsule    NEURONTIN    270 capsule    Take 1 capsule (100 mg) by mouth 3 times daily    Anxiety, Schizoaffective disorder, bipolar type (H)       lithium 300 MG CR tablet    ESKALITH/LITHOBID    270 tablet    Two each evening and one each morning.    Schizoaffective disorder, bipolar type (H)       OLANZapine 10 MG tablet    zyPREXA    30 tablet    Take 1 tablet (10 mg) by mouth daily as needed For severe agitation or hallucinations or paranoia    Schizoaffective disorder, bipolar type (H)       prazosin 2 MG capsule    MINIPRESS    90 capsule    Take 1 capsule (2 mg) by mouth At Bedtime    PTSD (post-traumatic stress disorder)       propranolol 10 MG tablet    INDERAL    180 tablet    Take 1 tablet (10 mg) by mouth 2 times daily    Schizoaffective disorder, bipolar type (H), PTSD (post-traumatic stress disorder), Anxiety
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
## 2017-12-20 ASSESSMENT — ANXIETY QUESTIONNAIRES: GAD7 TOTAL SCORE: 8

## 2017-12-21 NOTE — TELEPHONE ENCOUNTER
Received response from the Appeal    Response provided to the pharmacy, routed to the provider.              Ham Patiño RT (r)  [facility]

## 2017-12-27 ENCOUNTER — OFFICE VISIT (OUTPATIENT)
Dept: PSYCHOLOGY | Facility: CLINIC | Age: 27
End: 2017-12-27
Attending: CLINICAL NURSE SPECIALIST
Payer: COMMERCIAL

## 2017-12-27 ENCOUNTER — FCC EXTENDED DOCUMENTATION (OUTPATIENT)
Dept: PSYCHOLOGY | Facility: CLINIC | Age: 27
End: 2017-12-27

## 2017-12-27 DIAGNOSIS — F31.31 BIPOLAR AFFECTIVE DISORDER, CURRENTLY DEPRESSED, MILD (H): ICD-10-CM

## 2017-12-27 DIAGNOSIS — F33.1 MAJOR DEPRESSIVE DISORDER, RECURRENT EPISODE, MODERATE WITH ANXIOUS DISTRESS (H): ICD-10-CM

## 2017-12-27 DIAGNOSIS — F41.1 GAD (GENERALIZED ANXIETY DISORDER): ICD-10-CM

## 2017-12-27 DIAGNOSIS — F19.10 POLYSUBSTANCE ABUSE (H): Primary | ICD-10-CM

## 2017-12-27 PROCEDURE — 90834 PSYTX W PT 45 MINUTES: CPT | Performed by: PSYCHOLOGIST

## 2017-12-27 ASSESSMENT — ANXIETY QUESTIONNAIRES
3. WORRYING TOO MUCH ABOUT DIFFERENT THINGS: NOT AT ALL
1. FEELING NERVOUS, ANXIOUS, OR ON EDGE: NOT AT ALL
2. NOT BEING ABLE TO STOP OR CONTROL WORRYING: NOT AT ALL
6. BECOMING EASILY ANNOYED OR IRRITABLE: NOT AT ALL
7. FEELING AFRAID AS IF SOMETHING AWFUL MIGHT HAPPEN: NOT AT ALL
GAD7 TOTAL SCORE: 0
5. BEING SO RESTLESS THAT IT IS HARD TO SIT STILL: NOT AT ALL
IF YOU CHECKED OFF ANY PROBLEMS ON THIS QUESTIONNAIRE, HOW DIFFICULT HAVE THESE PROBLEMS MADE IT FOR YOU TO DO YOUR WORK, TAKE CARE OF THINGS AT HOME, OR GET ALONG WITH OTHER PEOPLE: NOT DIFFICULT AT ALL

## 2017-12-27 ASSESSMENT — PATIENT HEALTH QUESTIONNAIRE - PHQ9
5. POOR APPETITE OR OVEREATING: NOT AT ALL
SUM OF ALL RESPONSES TO PHQ QUESTIONS 1-9: 0
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
## 2017-12-27 NOTE — MR AVS SNAPSHOT
MRN:[id number]                      After Visit Summary   12/27/2017    Ayana Prasad    MRN: [id number]           Visit Information        Provider Department      12/27/2017 10:00 AM Tisha Yepez, PhD [facility] ADHD      Your next 10 appointments already scheduled     Jan 03, 2018 10:00 AM CST   Return Visit with Tisha Yepez, PhD   [facility] ([facility])    [address] [place] [address] [id number] [phone]            Jan 03, 2018 11:00 AM CST   Return Visit with Tisha Yepez, PhD   [facility] ([facility])    [address] [place] [address] [id number] [phone]              MyChart Information     [facility] gives you secure access to your electronic health record. If you see a primary care provider, you can also send messages to your care team and make appointments. If you have questions, please call your primary care clinic.  If you do not have a primary care provider, please call [phone] and they will assist you.        Care EveryWhere ID     This is your Care EveryWhere ID. This could be used by other organizations to access your [place] medical records  [id number]        Equal Access to Services     PIPPA KOCH : Hadii dale starr Sohéctor, waaxda luqadaha, qaybta kaalmada adezafar, fozia elizabeth. So [facility] [phone].    ATENCIÓN: Si habla español, tiene a xiong disposición servicios gratuitos de asistencia lingüística. [facility] [phone].    We comply with applicable federal civil rights laws and [place] laws. We do not discriminate on the basis of race, color, national origin, age, disability, sex, sexual orientation, or gender identity.

## 2017-12-27 NOTE — Clinical Note
"Jarad Mcfadden,  I met with Ayana to begin the ADHD evaluation today. She was guarded/uncooperative during our session. She didn't want to complete the PHQ-9 or AVA-7, stating, \"They're all zeros.\" She generally provided very short responses to my questions and refused to talk about trauma history (despite historical diagnosis of PTSD). She stated, \"I haven't talked to anyone about it. Not even my therapist.\" That being said, it might be difficult to determine which of Ayana's symptoms are ADHD and which are trauma-related. (It's not clear if trauma occurred in childhood preceding the ADHD dx at age 7). She reported doing well at school (3.8 GPA from [employer] and a current 3.9 GPA through [employer]), so it's unclear where difficulties with concentration are interfering with functioning. I would be interested in hearing your thoughts!  Thanks!  Tisha Yepez, PhD, [phone]"

## 2017-12-28 ASSESSMENT — ANXIETY QUESTIONNAIRES: GAD7 TOTAL SCORE: 0

## 2018-01-05 PROBLEM — F41.9 ANXIETY: Status: ACTIVE | Noted: 2018-01-05

## 2018-01-18 ENCOUNTER — TRANSFERRED RECORDS (OUTPATIENT)
Dept: HEALTH INFORMATION MANAGEMENT | Facility: CLINIC | Age: 28
End: 2018-01-18

## 2018-02-01 ENCOUNTER — OFFICE VISIT (OUTPATIENT)
Dept: PSYCHOLOGY | Facility: CLINIC | Age: 28
End: 2018-02-01
Payer: COMMERCIAL

## 2018-02-01 DIAGNOSIS — F33.1 MAJOR DEPRESSIVE DISORDER, RECURRENT EPISODE, MODERATE WITH ANXIOUS DISTRESS (H): ICD-10-CM

## 2018-02-01 DIAGNOSIS — F41.1 GAD (GENERALIZED ANXIETY DISORDER): ICD-10-CM

## 2018-02-01 DIAGNOSIS — F31.31 BIPOLAR AFFECTIVE DISORDER, CURRENTLY DEPRESSED, MILD (H): Primary | ICD-10-CM

## 2018-02-01 PROCEDURE — 90791 PSYCH DIAGNOSTIC EVALUATION: CPT | Performed by: PSYCHOLOGIST

## 2018-02-01 ASSESSMENT — ANXIETY QUESTIONNAIRES
6. BECOMING EASILY ANNOYED OR IRRITABLE: NOT AT ALL
IF YOU CHECKED OFF ANY PROBLEMS ON THIS QUESTIONNAIRE, HOW DIFFICULT HAVE THESE PROBLEMS MADE IT FOR YOU TO DO YOUR WORK, TAKE CARE OF THINGS AT HOME, OR GET ALONG WITH OTHER PEOPLE: SOMEWHAT DIFFICULT
7. FEELING AFRAID AS IF SOMETHING AWFUL MIGHT HAPPEN: NOT AT ALL
2. NOT BEING ABLE TO STOP OR CONTROL WORRYING: MORE THAN HALF THE DAYS
1. FEELING NERVOUS, ANXIOUS, OR ON EDGE: MORE THAN HALF THE DAYS
5. BEING SO RESTLESS THAT IT IS HARD TO SIT STILL: MORE THAN HALF THE DAYS
3. WORRYING TOO MUCH ABOUT DIFFERENT THINGS: MORE THAN HALF THE DAYS
GAD7 TOTAL SCORE: 10

## 2018-02-01 ASSESSMENT — PATIENT HEALTH QUESTIONNAIRE - PHQ9: 5. POOR APPETITE OR OVEREATING: MORE THAN HALF THE DAYS

## 2018-02-01 NOTE — Clinical Note
Jarad Mcfadden, per our earlier consultation, it was concluded that an ADHD evaluation cannot be completed at this time. Client is referred to psychiatry and PCP for ongoing medication management. It is recommended that Client continue to participate in individual therapy to address mental health symptoms and Client is encouraged to disclose/discuss her history with trauma as trauma-processing work -and management of such symptoms- may be an important component of her recovery. Client indicated that she was not interested in returning to work with psychiatry. Writer encouraged Client to consult with psychiatry before self-adjusting or stopping meds.  Please let me know if you have questions or concerns.  Tisha Yepez, PhD, LP

## 2018-02-01 NOTE — MR AVS SNAPSHOT
MRN:[id number]                      After Visit Summary   2/1/2018    Ayana Prasad    MRN: [id number]           Visit Information        Provider Department      2/1/2018 3:00 PM Tisha Yepez, PhD [facility] ADHD      MyChart Information     MyChart gives you secure access to your electronic health record. If you see a primary care provider, you can also send messages to your care team and make appointments. If you have questions, please call your primary care clinic.  If you do not have a primary care provider, please call [phone] and they will assist you.        Care EveryWhere ID     This is your Care EveryWhere ID. This could be used by other organizations to access your [place] medical records  [id number]        Equal Access to Services     PIPPA KOCH : Garfield Price, wamarcelle augustine, tran beaulieu, fozia elizabeth. So [facility] [phone].    ATENCIÓN: Si habla español, tiene a xiong disposición servicios gratuitos de asistencia lingüística. Llame al [phone].    We comply with applicable federal civil rights laws and [place] laws. We do not discriminate on the basis of race, color, national origin, age, disability, sex, sexual orientation, or gender identity.
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
## 2018-02-02 ASSESSMENT — PATIENT HEALTH QUESTIONNAIRE - PHQ9: SUM OF ALL RESPONSES TO PHQ QUESTIONS 1-9: 5

## 2018-02-02 ASSESSMENT — ANXIETY QUESTIONNAIRES: GAD7 TOTAL SCORE: 10

## 2018-02-08 ENCOUNTER — OFFICE VISIT (OUTPATIENT)
Dept: FAMILY MEDICINE | Facility: CLINIC | Age: 28
End: 2018-02-08
Payer: COMMERCIAL

## 2018-02-08 VITALS
WEIGHT: 221.6 LBS | DIASTOLIC BLOOD PRESSURE: 76 MMHG | BODY MASS INDEX: 34.78 KG/M2 | SYSTOLIC BLOOD PRESSURE: 104 MMHG | HEIGHT: 67 IN | TEMPERATURE: 98.4 F | HEART RATE: 88 BPM

## 2018-02-08 DIAGNOSIS — F25.0 SCHIZOAFFECTIVE DISORDER, BIPOLAR TYPE (H): ICD-10-CM

## 2018-02-08 DIAGNOSIS — F41.9 ANXIETY: ICD-10-CM

## 2018-02-08 DIAGNOSIS — M54.9 INTRACTABLE BACK PAIN: ICD-10-CM

## 2018-02-08 DIAGNOSIS — R20.0 NUMBNESS: Primary | ICD-10-CM

## 2018-02-08 DIAGNOSIS — F31.11 BIPOLAR 1 DISORDER, MANIC, MILD (H): ICD-10-CM

## 2018-02-08 DIAGNOSIS — E66.09 CLASS 2 OBESITY DUE TO EXCESS CALORIES WITHOUT SERIOUS COMORBIDITY WITH BODY MASS INDEX (BMI) OF 35.0 TO 35.9 IN ADULT: ICD-10-CM

## 2018-02-08 DIAGNOSIS — E66.812 CLASS 2 OBESITY DUE TO EXCESS CALORIES WITHOUT SERIOUS COMORBIDITY WITH BODY MASS INDEX (BMI) OF 35.0 TO 35.9 IN ADULT: ICD-10-CM

## 2018-02-08 PROCEDURE — 99213 OFFICE O/P EST LOW 20 MIN: CPT | Performed by: NURSE PRACTITIONER

## 2018-02-08 RX ORDER — GABAPENTIN 100 MG/1
200 CAPSULE ORAL 3 TIMES DAILY
Qty: 270 CAPSULE | Refills: 1 | Status: ON HOLD
Start: 2018-02-08 | End: 2018-02-28

## 2018-02-08 ASSESSMENT — ANXIETY QUESTIONNAIRES
7. FEELING AFRAID AS IF SOMETHING AWFUL MIGHT HAPPEN: NOT AT ALL
3. WORRYING TOO MUCH ABOUT DIFFERENT THINGS: MORE THAN HALF THE DAYS
2. NOT BEING ABLE TO STOP OR CONTROL WORRYING: MORE THAN HALF THE DAYS
GAD7 TOTAL SCORE: 12
5. BEING SO RESTLESS THAT IT IS HARD TO SIT STILL: MORE THAN HALF THE DAYS
6. BECOMING EASILY ANNOYED OR IRRITABLE: MORE THAN HALF THE DAYS
1. FEELING NERVOUS, ANXIOUS, OR ON EDGE: MORE THAN HALF THE DAYS

## 2018-02-08 ASSESSMENT — ENCOUNTER SYMPTOMS
FEVER: 0
RHINORRHEA: 0
WHEEZING: 0
DIZZINESS: 0
DIAPHORESIS: 0
DIARRHEA: 0
HEADACHES: 0
EYE DISCHARGE: 0
CHILLS: 0
NAUSEA: 0
SORE THROAT: 0
CONSTIPATION: 0
BACK PAIN: 1
COUGH: 0
SINUS PRESSURE: 0
LIGHT-HEADEDNESS: 0
EYE ITCHING: 0
NUMBNESS: 1
FATIGUE: 1
SHORTNESS OF BREATH: 0

## 2018-02-08 ASSESSMENT — PATIENT HEALTH QUESTIONNAIRE - PHQ9: 5. POOR APPETITE OR OVEREATING: MORE THAN HALF THE DAYS

## 2018-02-08 NOTE — NURSING NOTE
"Chief Complaint   Patient presents with     Recheck Medication       Initial /76 (BP Location: Left arm, Patient Position: Sitting, Cuff Size: Adult Large)  Pulse 88  Temp 98.4  F (36.9  C) (Tympanic)  Ht 5' 6.5\" (1.689 m)  Wt 221 lb 9.6 oz (100.5 kg)  BMI 35.23 kg/m2 Estimated body mass index is 35.23 kg/(m^2) as calculated from the following:    Height as of this encounter: 5' 6.5\" (1.689 m).    Weight as of this encounter: 221 lb 9.6 oz (100.5 kg).  Medication Reconciliation: complete     April ASHLYN Price      "

## 2018-02-08 NOTE — PROGRESS NOTES
SUBJECTIVE:   Ayana Prasad is a 27 year old female who presents to clinic today for the following health issues:    Declines scheduling physical and pap. Also declines flu vaccine today.    Medication Followup of propranolol    Taking Medication as prescribed: yes    Side Effects:  None    Medication Helping Symptoms:  Yes-wants to continue taking at current dose     Medication Followup of olanzapine    Taking Medication as prescribed: has not taken any since July    Side Effects:  None    Medication Helping Symptoms:  Yes-not sure if she wants to continue taking       Medication Followup of lithium    Taking Medication as prescribed: yes    Side Effects:  acne    Medication Helping Symptoms:  Unsure-wants to decrease dose to see if it is helpful     Medication Followup of gabapentin    Taking Medication as prescribed: yes    Side Effects:  None    Medication Helping Symptoms:  Yes-wants to increase dose for more pain control     Medication Followup of eszopiclone    Taking Medication as prescribed: yes    Side Effects:  None    Medication Helping Symptoms:  Yes-wants to stop taking and to find a better way to manage her insomnia     Medication Followup of ariprazole    Taking Medication as prescribed: yes    Side Effects:  Weight gain    Medication Helping Symptoms:  Unsure-wants to decrease dose to see if it is helpful     Medication Followup of prazosin    Taking Medication as prescribed: yes    Side Effects:  None    Medication Helping Symptoms:  Unsure-wants to decrease dose to see if it is helpful       Problem list and histories reviewed & adjusted, as indicated.  Additional history: as documented    Current Outpatient Prescriptions   Medication Sig Dispense Refill     gabapentin (NEURONTIN) 100 MG capsule Take 2 capsules (200 mg) by mouth 3 times daily 270 capsule 1     propranolol (INDERAL) 10 MG tablet Take 1 tablet (10 mg) by mouth 2 times daily 180 tablet 1     prazosin (MINIPRESS) 2 MG capsule Take  1 capsule (2 mg) by mouth At Bedtime 90 capsule 1     lithium (ESKALITH/LITHOBID) 300 MG CR tablet Two each evening and one each morning. 270 tablet 1     eszopiclone (LUNESTA) 3 MG tablet Take 1 tablet (3 mg) by mouth At Bedtime 30 tablet 2     ARIPiprazole (ABILIFY) 10 MG tablet Take 1 tablet (10 mg) by mouth daily 90 tablet 1     [DISCONTINUED] gabapentin (NEURONTIN) 100 MG capsule Take 1 capsule (100 mg) by mouth 3 times daily 270 capsule 1     Allergies   Allergen Reactions     Adhesive Tape Hives     Prednisone Other (See Comments) and Hives     Suicidal ideation     Droperidol Anxiety       Reviewed and updated as needed this visit by clinical staff  Tobacco  Allergies  Meds  Med Hx  Surg Hx  Fam Hx  Soc Hx      Reviewed and updated as needed this visit by Provider          Had epidural injection to back about 2 weeks ago and that really has not helped. Would like like to have shyam increased to help decrease the numbness to left leg. Numbness is there most of the time. Currently taking shyam for anxiety, unsure if shyam is helping with anxiety. Is sleeping ok at night. Denies illicit drug use.     Would like to get off of all medications.  Unsure if they are currently working.  Wondering about what would happen if abruptly stopped medications.    Has never had Pap, declines wanting to do so.  Declines wanting to set up appointment for Pap.    Concern regarding weight.  Gradually increasing.  May be side effect from medications.  Reports walks every day and tries to go to the gym.  Reports he eats a well-balanced diet.  Drinking a Monster energy drink.  Reports he usually tries to drink water.  Reports is very tired during the day and that is why needs the energy drinks.    ROS:  Review of Systems   Constitutional: Positive for fatigue. Negative for chills, diaphoresis and fever.   HENT: Negative for ear discharge, ear pain, hearing loss, rhinorrhea, sinus pressure and sore throat.    Eyes: Negative for  "discharge and itching.   Respiratory: Negative for cough, shortness of breath and wheezing.    Gastrointestinal: Negative for constipation, diarrhea and nausea.   Musculoskeletal: Positive for back pain.   Skin: Negative for rash.   Neurological: Positive for numbness (left leg). Negative for dizziness, light-headedness and headaches.         OBJECTIVE:     /76 (BP Location: Left arm, Patient Position: Sitting, Cuff Size: Adult Large)  Pulse 88  Temp 98.4  F (36.9  C) (Tympanic)  Ht 5' 6.5\" (1.689 m)  Wt 221 lb 9.6 oz (100.5 kg)  BMI 35.23 kg/m2  Body mass index is 35.23 kg/(m^2).  Physical Exam   Constitutional: She appears well-developed and well-nourished.   Cardiovascular: Normal rate and regular rhythm.    Pulmonary/Chest: Effort normal and breath sounds normal.   Neurological: She is alert.   Skin: Skin is warm and dry.       ASSESSMENT/PLAN:   1. Bipolar 1 disorder, manic, mild  Informed that need to continue to follow with mental health.  Educated regarding importance of weaning off medications.  - DEPRESSION ACTION PLAN (DAP)    2. Anxiety  We will plan to increase Neurontin, #4 neuropathy.  - gabapentin (NEURONTIN) 100 MG capsule; Take 2 capsules (200 mg) by mouth 3 times daily  Dispense: 270 capsule; Refill: 1    3. Schizoaffective disorder, bipolar type (H)  Continue to follow with mental health.    4. Numbness  Increase Neurontin to 200 mg 3 times per day.  - gabapentin (NEURONTIN) 100 MG capsule; Take 2 capsules (200 mg) by mouth 3 times daily  Dispense: 270 capsule; Refill: 1    5. Class 2 obesity due to excess calories without serious comorbidity with body mass index (BMI) of 35.0 to 35.9 in adult  Enc to count calories  Try and eat 9231-9363 calories per day  Be active  Eat a well balanced diet with fresh fruits and vegetables  Drink plenty of water 6-8 8 oz glasses of water per day  Be aware of portion sizes  Enc to look at food labels  Avoid pop and energy drinks       6. Intractable " back pain  Continue to follow with pain center.          Plan to increase gabapentin for 200 mg three times per day     BR[facility]N Cruz [facility]

## 2018-02-08 NOTE — MR AVS SNAPSHOT
"              After Visit Summary   2/8/2018    Ayana Prasad    MRN: [id number]           Patient Information     Date Of Birth          1990        Visit Information        Provider Department      2/8/2018 1:00 PM Becki Avery APRN [facility]        Today's Diagnoses     Numbness    -  1    Bipolar 1 disorder, manic, mild        Anxiety        Schizoaffective disorder, bipolar type (H)           Follow-ups after your visit        Who to contact     Normal or non-critical lab and imaging results will be communicated to you by [employer], letter or phone within 4 business days after the clinic has received the results. If you do not hear from us within 7 days, please contact the clinic through [employer] or phone. If you have a critical or abnormal lab result, we will notify you by phone as soon as possible.  Submit refill requests through [employer] or call your pharmacy and they will forward the refill request to us. Please allow 3 business days for your refill to be completed.          If you need to speak with a  for additional information , please call: [phone]           Additional Information About Your Visit        MyChart Information     [employer] gives you secure access to your electronic health record. If you see a primary care provider, you can also send messages to your care team and make appointments. If you have questions, please call your primary care clinic.  If you do not have a primary care provider, please call [phone] and they will assist you.        Care EveryWhere ID     This is your Care EveryWhere ID. This could be used by other organizations to access your [facility] medical records  [id number]        Your Vitals Were     Pulse Temperature Height BMI (Body Mass Index)          88 98.4  F (36.9  C) (Tympanic) 5' 6.5\" (1.689 m) 35.23 kg/m2         Blood Pressure from Last 3 Encounters:   02/08/18 104/76   12/19/17 104/69   11/09/17 124/88 "    Weight from Last 3 Encounters:   02/08/18 221 lb 9.6 oz (100.5 kg)   12/19/17 212 lb (96.2 kg)   11/09/17 215 lb (97.5 kg)              We Performed the Following     DEPRESSION ACTION PLAN (DAP)          Today's Medication Changes          These changes are accurate as of 2/8/18  1:24 PM.  If you have any questions, ask your nurse or doctor.               These medicines have changed or have updated prescriptions.        Dose/Directions    gabapentin 100 MG capsule   Commonly known as:  NEURONTIN   This may have changed:  how much to take   Used for:  Anxiety, Numbness   Changed by:  Becki Avery APRN CNP        Dose:  200 mg   Take 2 capsules (200 mg) by mouth 3 times daily   Quantity:  270 capsule   Refills:  1         Stop taking these medicines if you haven't already. Please contact your care team if you have questions.     OLANZapine 10 MG tablet   Commonly known as:  zyPREXA   Stopped by:  Becki Avery APRN CNP                Where to get your medicines      Some of these will need a paper prescription and others can be bought over the counter.  Ask your nurse if you have questions.     You don't need a prescription for these medications     gabapentin 100 MG capsule                Primary Care Provider Office Phone # Fax #    BROOKLYN Ford [phone]       [address]   [facility] [address]        Equal Access to Services     PIPPA KOCH : Hadii dale starr Sohéctor, waaxda luqadaha, qaybta kaalmada brittnee, fozia elizabeth. So [facility] [phone].    ATENCIÓN: Si habla español, tiene a xiong disposición servicios gratuitos de asistencia lingüística. Nishant al [phone].    We comply with applicable federal civil rights laws and [place] laws. We do not discriminate on the basis of race, color, national origin, age, disability, sex, sexual orientation, or gender identity.            Thank  you!     Thank you for choosing [facility]  for your care. Our goal is always to provide you with excellent care. Hearing back from our patients is one way we can continue to improve our services. Please take a few minutes to complete the written survey that you may receive in the mail after your visit with us. Thank you!             Your Updated Medication List - Protect others around you: Learn how to safely use, store and throw away your medicines at www.disposemymeds.org.          This list is accurate as of 2/8/18  1:24 PM.  Always use your most recent med list.                   Brand Name Dispense Instructions for use Diagnosis    ARIPiprazole 10 MG tablet    ABILIFY    90 tablet    Take 1 tablet (10 mg) by mouth daily    Schizoaffective disorder, bipolar type (H)       eszopiclone 3 MG tablet    LUNESTA    30 tablet    Take 1 tablet (3 mg) by mouth At Bedtime    Primary insomnia       gabapentin 100 MG capsule    NEURONTIN    270 capsule    Take 2 capsules (200 mg) by mouth 3 times daily    Anxiety, Numbness       lithium 300 MG CR tablet    ESKALITH/LITHOBID    270 tablet    Two each evening and one each morning.    Schizoaffective disorder, bipolar type (H)       prazosin 2 MG capsule    MINIPRESS    90 capsule    Take 1 capsule (2 mg) by mouth At Bedtime    PTSD (post-traumatic stress disorder)       propranolol 10 MG tablet    INDERAL    180 tablet    Take 1 tablet (10 mg) by mouth 2 times daily    Schizoaffective disorder, bipolar type (H), PTSD (post-traumatic stress disorder), Anxiety

## 2018-02-08 NOTE — LETTER
My Depression Action Plan  Name: Ayana Prasad   Date of Birth 1990  Date: 2/8/2018    My doctor: Becki Avery   My clinic: [address] [id number] [phone]          GREEN    ZONE   Good Control    What it looks like:     Things are going generally well. You have normal up s and down s. You may even feel depressed from time to time, but bad moods usually last less than a day.   What you need to do:  1. Continue to care for yourself (see self care plan)  2. Check your depression survival kit and update it as needed  3. Follow your physician s recommendations including any medication.  4. Do not stop taking medication unless you consult with your physician first.           YELLOW         ZONE Getting Worse    What it looks like:     Depression is starting to interfere with your life.     It may be hard to get out of bed; you may be starting to isolate yourself from others.    Symptoms of depression are starting to last most all day and this has happened for several days.     You may have suicidal thoughts but they are not constant.   What you need to do:     1. Call your care team, your response to treatment will improve if you keep your care team informed of your progress. Yellow periods are signs an adjustment may need to be made.     2. Continue your self-care, even if you have to fake it!    3. Talk to someone in your support network    4. Open up your depression survival kit           RED    ZONE Medical Alert - Get Help    What it looks like:     Depression is seriously interfering with your life.     You may experience these or other symptoms: You can t get out of bed most days, can t work or engage in other necessary activities, you have trouble taking care of basic hygiene, or basic responsibilities, thoughts of suicide or death that will not go away, self-injurious behavior.     What you need to do:  1. Call your care team and  request a same-day appointment. If they are not available (weekends or after hours) call your local crisis line, emergency room or 911.          Depression Self Care Plan / Survival Kit    Self-Care for Depression  Here s the deal. Your body and mind are really not as separate as most people think.  What you do and think affects how you feel and how you feel influences what you do and think. This means if you do things that people who feel good do, it will help you feel better.  Sometimes this is all it takes.  There is also a place for medication and therapy depending on how severe your depression is, so be sure to consult with your medical provider and/ or Behavioral Health Consultant if your symptoms are worsening or not improving.     In order to better manage my stress, I will:    Exercise  Get some form of exercise, every day. This will help reduce pain and release endorphins, the  feel good  chemicals in your brain. This is almost as good as taking antidepressants!  This is not the same as joining a gym and then never going! (they count on that by the way ) It can be as simple as just going for a walk or doing some gardening, anything that will get you moving.      Hygiene   Maintain good hygiene (Get out of bed in the morning, Make your bed, Brush your teeth, Take a shower, and Get dressed like you were going to work, even if you are unemployed).  If your clothes don't fit try to get ones that do.    Diet  I will strive to eat foods that are good for me, drink plenty of water, and avoid excessive sugar, caffeine, alcohol, and other mood-altering substances.  Some foods that are helpful in depression are: complex carbohydrates, B vitamins, flaxseed, fish or fish oil, fresh fruits and vegetables.    Psychotherapy  I agree to participate in Individual Therapy (if recommended).    Medication  If prescribed medications, I agree to take them.  Missing doses can result in serious side effects.  I understand that  drinking alcohol, or other illicit drug use, may cause potential side effects.  I will not stop my medication abruptly without first discussing it with my provider.    Staying Connected With Others  I will stay in touch with my friends, family members, and my primary care provider/team.    Use your imagination  Be creative.  We all have a creative side; it doesn t matter if it s oil painting, sand castles, or mud pies! This will also kick up the endorphins.    Witness Beauty  (AKA stop and smell the roses) Take a look outside, even in mid-winter. Notice colors, textures. Watch the squirrels and birds.     Service to others  Be of service to others.  There is always someone else in need.  By helping others we can  get out of ourselves  and remember the really important things.  This also provides opportunities for practicing all the other parts of the program.    Humor  Laugh and be silly!  Adjust your TV habits for less news and crime-drama and more comedy.    Control your stress  Try breathing deep, massage therapy, biofeedback, and meditation. Find time to relax each day.     My support system    Clinic Contact:  Phone number:    Contact 1:  Phone number:    Contact 2:  Phone number:    Protestant/:  Phone number:    Therapist:  Phone number:    Local crisis center:    Phone number:    Other community support:  Phone number:

## 2018-02-09 ASSESSMENT — ANXIETY QUESTIONNAIRES: GAD7 TOTAL SCORE: 12

## 2018-02-09 ASSESSMENT — PATIENT HEALTH QUESTIONNAIRE - PHQ9: SUM OF ALL RESPONSES TO PHQ QUESTIONS 1-9: 0

## 2018-02-26 ENCOUNTER — HOSPITAL ENCOUNTER (INPATIENT)
Facility: CLINIC | Age: 28
LOS: 2 days | Discharge: HOME | End: 2018-02-28
Attending: PSYCHIATRY & NEUROLOGY | Admitting: PSYCHIATRY & NEUROLOGY
Payer: COMMERCIAL

## 2018-02-26 ENCOUNTER — HOSPITAL ENCOUNTER (EMERGENCY)
Facility: CLINIC | Age: 28
Discharge: PSYCHIATRIC OR SUBSTANCE USE FACILITY | End: 2018-02-26
Attending: EMERGENCY MEDICINE | Admitting: EMERGENCY MEDICINE
Payer: COMMERCIAL

## 2018-02-26 VITALS
BODY MASS INDEX: 31.8 KG/M2 | TEMPERATURE: 98.8 F | WEIGHT: 200 LBS | RESPIRATION RATE: 20 BRPM | OXYGEN SATURATION: 98 % | DIASTOLIC BLOOD PRESSURE: 96 MMHG | SYSTOLIC BLOOD PRESSURE: 135 MMHG

## 2018-02-26 DIAGNOSIS — F32.A DEPRESSION, UNSPECIFIED DEPRESSION TYPE: ICD-10-CM

## 2018-02-26 DIAGNOSIS — F25.0 SCHIZOAFFECTIVE DISORDER, BIPOLAR TYPE (H): Primary | ICD-10-CM

## 2018-02-26 DIAGNOSIS — R44.0 AUDITORY HALLUCINATIONS: ICD-10-CM

## 2018-02-26 LAB
ANION GAP SERPL CALCULATED.3IONS-SCNC: 7 MMOL/L (ref 3–14)
APAP SERPL-MCNC: <2 MG/L (ref 10–20)
BASOPHILS # BLD AUTO: 0.1 10E9/L (ref 0–0.2)
BASOPHILS NFR BLD AUTO: 0.5 %
BUN SERPL-MCNC: 14 MG/DL (ref 7–30)
CALCIUM SERPL-MCNC: 9.3 MG/DL (ref 8.5–10.1)
CHLORIDE SERPL-SCNC: 107 MMOL/L (ref 94–109)
CO2 SERPL-SCNC: 22 MMOL/L (ref 20–32)
CREAT SERPL-MCNC: 0.73 MG/DL (ref 0.52–1.04)
DIFFERENTIAL METHOD BLD: ABNORMAL
EOSINOPHIL # BLD AUTO: 0.2 10E9/L (ref 0–0.7)
EOSINOPHIL NFR BLD AUTO: 1.6 %
ERYTHROCYTE [DISTWIDTH] IN BLOOD BY AUTOMATED COUNT: 13.7 % (ref 10–15)
ETHANOL SERPL-MCNC: <0.01 G/DL
GFR SERPL CREATININE-BSD FRML MDRD: >90 ML/MIN/1.7M2
GLUCOSE SERPL-MCNC: 106 MG/DL (ref 70–99)
HCT VFR BLD AUTO: 44.5 % (ref 35–47)
HGB BLD-MCNC: 15.1 G/DL (ref 11.7–15.7)
IMM GRANULOCYTES # BLD: 0 10E9/L (ref 0–0.4)
IMM GRANULOCYTES NFR BLD: 0.2 %
LITHIUM SERPL-SCNC: 0.41 MMOL/L (ref 0.6–1.2)
LYMPHOCYTES # BLD AUTO: 3.2 10E9/L (ref 0.8–5.3)
LYMPHOCYTES NFR BLD AUTO: 23.9 %
MCH RBC QN AUTO: 28.2 PG (ref 26.5–33)
MCHC RBC AUTO-ENTMCNC: 33.9 G/DL (ref 31.5–36.5)
MCV RBC AUTO: 83 FL (ref 78–100)
MONOCYTES # BLD AUTO: 0.7 10E9/L (ref 0–1.3)
MONOCYTES NFR BLD AUTO: 4.9 %
NEUTROPHILS # BLD AUTO: 9.2 10E9/L (ref 1.6–8.3)
NEUTROPHILS NFR BLD AUTO: 68.9 %
PLATELET # BLD AUTO: 395 10E9/L (ref 150–450)
POTASSIUM SERPL-SCNC: 3.9 MMOL/L (ref 3.4–5.3)
RBC # BLD AUTO: 5.36 10E12/L (ref 3.8–5.2)
SALICYLATES SERPL-MCNC: <2 MG/DL
SODIUM SERPL-SCNC: 136 MMOL/L (ref 133–144)
WBC # BLD AUTO: 13.4 10E9/L (ref 4–11)

## 2018-02-26 PROCEDURE — 80329 ANALGESICS NON-OPIOID 1 OR 2: CPT | Performed by: EMERGENCY MEDICINE

## 2018-02-26 PROCEDURE — 80048 BASIC METABOLIC PNL TOTAL CA: CPT | Performed by: EMERGENCY MEDICINE

## 2018-02-26 PROCEDURE — 25000125 ZZHC RX 250: Performed by: EMERGENCY MEDICINE

## 2018-02-26 PROCEDURE — 80178 ASSAY OF LITHIUM: CPT | Performed by: EMERGENCY MEDICINE

## 2018-02-26 PROCEDURE — 99207 ZZC CDG-MDM COMPONENT: MEETS LOW - DOWN CODED: CPT | Performed by: PSYCHIATRY & NEUROLOGY

## 2018-02-26 PROCEDURE — 96372 THER/PROPH/DIAG INJ SC/IM: CPT | Performed by: EMERGENCY MEDICINE

## 2018-02-26 PROCEDURE — 90791 PSYCH DIAGNOSTIC EVALUATION: CPT

## 2018-02-26 PROCEDURE — 99285 EMERGENCY DEPT VISIT HI MDM: CPT | Mod: Z6 | Performed by: EMERGENCY MEDICINE

## 2018-02-26 PROCEDURE — 99285 EMERGENCY DEPT VISIT HI MDM: CPT | Mod: 25 | Performed by: EMERGENCY MEDICINE

## 2018-02-26 PROCEDURE — 12400007 ZZH R&B MH INTERMEDIATE UMMC

## 2018-02-26 PROCEDURE — 85025 COMPLETE CBC W/AUTO DIFF WBC: CPT | Performed by: EMERGENCY MEDICINE

## 2018-02-26 PROCEDURE — 80320 DRUG SCREEN QUANTALCOHOLS: CPT | Performed by: EMERGENCY MEDICINE

## 2018-02-26 PROCEDURE — 36415 COLL VENOUS BLD VENIPUNCTURE: CPT | Performed by: EMERGENCY MEDICINE

## 2018-02-26 PROCEDURE — 25000132 ZZH RX MED GY IP 250 OP 250 PS 637: Performed by: PSYCHIATRY & NEUROLOGY

## 2018-02-26 PROCEDURE — 99222 1ST HOSP IP/OBS MODERATE 55: CPT | Mod: AI | Performed by: PSYCHIATRY & NEUROLOGY

## 2018-02-26 RX ORDER — HYDROXYZINE HYDROCHLORIDE 25 MG/1
25 TABLET, FILM COATED ORAL EVERY 4 HOURS PRN
Status: DISCONTINUED | OUTPATIENT
Start: 2018-02-26 | End: 2018-02-28 | Stop reason: HOSPADM

## 2018-02-26 RX ORDER — PROPRANOLOL HYDROCHLORIDE 10 MG/1
10 TABLET ORAL 2 TIMES DAILY
Status: DISCONTINUED | OUTPATIENT
Start: 2018-02-26 | End: 2018-02-28 | Stop reason: HOSPADM

## 2018-02-26 RX ORDER — TRETINOIN 0.5 MG/G
CREAM TOPICAL AT BEDTIME
Status: DISCONTINUED | OUTPATIENT
Start: 2018-02-26 | End: 2018-02-28 | Stop reason: HOSPADM

## 2018-02-26 RX ORDER — LITHIUM CARBONATE 300 MG/1
300 TABLET, FILM COATED, EXTENDED RELEASE ORAL DAILY
Status: DISCONTINUED | OUTPATIENT
Start: 2018-02-27 | End: 2018-02-27

## 2018-02-26 RX ORDER — OLANZAPINE 10 MG/2ML
10 INJECTION, POWDER, FOR SOLUTION INTRAMUSCULAR ONCE
Status: COMPLETED | OUTPATIENT
Start: 2018-02-26 | End: 2018-02-26

## 2018-02-26 RX ORDER — GABAPENTIN 100 MG/1
200 CAPSULE ORAL 3 TIMES DAILY
Status: DISCONTINUED | OUTPATIENT
Start: 2018-02-26 | End: 2018-02-28 | Stop reason: HOSPADM

## 2018-02-26 RX ORDER — LITHIUM CARBONATE 300 MG/1
600 TABLET, FILM COATED, EXTENDED RELEASE ORAL AT BEDTIME
Status: DISCONTINUED | OUTPATIENT
Start: 2018-02-26 | End: 2018-02-27

## 2018-02-26 RX ORDER — MIRTAZAPINE 7.5 MG/1
7.5 TABLET, FILM COATED ORAL AT BEDTIME
Status: DISCONTINUED | OUTPATIENT
Start: 2018-02-26 | End: 2018-02-28 | Stop reason: HOSPADM

## 2018-02-26 RX ORDER — PRAZOSIN HYDROCHLORIDE 1 MG/1
2 CAPSULE ORAL AT BEDTIME
Status: DISCONTINUED | OUTPATIENT
Start: 2018-02-26 | End: 2018-02-28 | Stop reason: HOSPADM

## 2018-02-26 RX ORDER — ESZOPICLONE 1 MG/1
3 TABLET, FILM COATED ORAL AT BEDTIME
Status: DISCONTINUED | OUTPATIENT
Start: 2018-02-26 | End: 2018-02-28 | Stop reason: HOSPADM

## 2018-02-26 RX ADMIN — GABAPENTIN 200 MG: 100 CAPSULE ORAL at 22:01

## 2018-02-26 RX ADMIN — TRETINOIN: 0.05 CREAM TOPICAL at 22:02

## 2018-02-26 RX ADMIN — LITHIUM CARBONATE 600 MG: 300 TABLET, EXTENDED RELEASE ORAL at 22:01

## 2018-02-26 RX ADMIN — ESZOPICLONE 3 MG: 1 TABLET, COATED ORAL at 22:01

## 2018-02-26 RX ADMIN — PROPRANOLOL HYDROCHLORIDE 10 MG: 10 TABLET ORAL at 22:02

## 2018-02-26 RX ADMIN — PRAZOSIN HYDROCHLORIDE 2 MG: 1 CAPSULE ORAL at 22:01

## 2018-02-26 RX ADMIN — OLANZAPINE 10 MG: 10 INJECTION, POWDER, FOR SOLUTION INTRAMUSCULAR at 04:11

## 2018-02-26 RX ADMIN — MIRTAZAPINE 7.5 MG: 7.5 TABLET ORAL at 22:01

## 2018-02-26 RX ADMIN — GABAPENTIN 200 MG: 100 CAPSULE ORAL at 16:08

## 2018-02-26 ASSESSMENT — ACTIVITIES OF DAILY LIVING (ADL)
RETIRED_COMMUNICATION: 0-->UNDERSTANDS/COMMUNICATES WITHOUT DIFFICULTY
PRIOR_FUNCTIONAL_LEVEL_COMMENT: GOOD
EATING: 0 - INDEPENDENT
FALL_HISTORY_WITHIN_LAST_SIX_MONTHS: NO
BATHING: 0 - INDEPENDENT
ORAL_HYGIENE: INDEPENDENT
TRANSFERRING: 0-->INDEPENDENT
GROOMING: INDEPENDENT
TOILETING: 0 - INDEPENDENT
COGNITION: 0 - NO COGNITION ISSUES REPORTED
LAUNDRY: UNABLE TO COMPLETE
SWALLOWING: 0-->SWALLOWS FOODS/LIQUIDS WITHOUT DIFFICULTY
COMMUNICATION: 0 - UNDERSTANDS/COMMUNICATES WITHOUT DIFFICULTY
AMBULATION: 0-->INDEPENDENT
CHANGE_IN_FUNCTIONAL_STATUS_SINCE_ONSET_OF_CURRENT_ILLNESS/INJURY: NO
BATHING: 0-->INDEPENDENT
DRESS: INDEPENDENT
TOILETING: 0-->INDEPENDENT
CURRENT_FUNCTIONAL_LEVEL_COMMENT: GOOD
DRESS: 0-->INDEPENDENT
RETIRED_EATING: 0-->INDEPENDENT
DRESS: 0 - INDEPENDENT
TRANSFERRING: 0 - INDEPENDENT
SWALLOWING: 0 - SWALLOWS FOODS/LIQUIDS WITHOUT DIFFICULTY
AMBULATION: 0 - INDEPENDENT

## 2018-02-26 NOTE — PROGRESS NOTES
Admitted to [facility] due to hearing voices to kill herself or hurt herself.  Pt. states that she is not suicidal.  Pt. has a history of schizoaffective disorder/ bipolar type, ptsd, anxiety, and a history of substance abuse.  Pt. states that she has used IV heroin in the past but not for approximately 2-3 years.  Pt. states that she began experiencing auditory hallucinations last May of 2017.

## 2018-02-26 NOTE — ED NOTES
"Patient unable to urinate at this time. Very paranoid about blood draw, nurse explained blood draw and reason for it, as long as wife stayed in room to \"make sure they don't put anything in me! You never know what they will put in you!\" Patient allowed blood draw. Dec Nurse online talking to patient at this time. Wife stepped out of room spoke to nurse stating that patient \"is going to freak out when I leave! Just top warn you.\" State she does not know what she will do but know she will probably start to panic. Security outside of room updated.   "

## 2018-02-26 NOTE — ED NOTES
Patient continues sleeping with no distress or change in condition. Security continues on standby.

## 2018-02-26 NOTE — PROGRESS NOTES
02/26/18 1030   Patient Belongings   Did you bring any home meds/supplements to the hospital?  Yes   Disposition of meds  Other (see comment)   Patient Belongings shoes;clothing;tote   Belongings Search Yes   Clothing Search Yes   Second Staff Aster Hui   IN LOCKER:  Blue back pack  Box of colored pens (markers permanent)  Misc clothes ( t shirts & green sweat shirt,shorts & pj pants)  Black ball cap  Acne gel ( to stay on unit)  Slipper/sandals  *NOTHING SENT TO SECURITY*  A               Admission:  I am responsible for any personal items that are not sent to the safe or pharmacy.  [facility] is not responsible for loss, theft or damage of any property in my possession.    Signature:  _________________________________ Date: _______  Time: _____                                              Staff Signature:  ____________________________ Date: ________  Time: _____      2nd Staff person, if patient is unable/unwilling to sign:    Signature: ________________________________ Date: ________  Time: _____     Discharge:  [facility] has returned all of my personal belongings:    Signature: _________________________________ Date: ________  Time: _____                                          Staff Signature:  ____________________________ Date: ________  Time: _____

## 2018-02-26 NOTE — IP AVS SNAPSHOT
MRN:[id number]                      After Visit Summary   2/26/2018    Ayana Prasad    MRN: [id number]           Thank you!     Thank you for choosing [facility] for your care. Our goal is always to provide you with excellent care.        Patient Information     Date Of Birth          1990        About your hospital stay     You were admitted on:  February 26, 2018 You last received care in the:   32NR    You were discharged on:  February 28, 2018       Who to Call     For medical emergencies, please call 911.  For non-urgent questions about your medical care, please call your primary care provider or clinic, [phone]          Attending Provider     Provider Specialty    Mahad Diez MD Psychiatry       Primary Care Provider Office Phone # Fax #    Becki BROOKLYN Argueta [phone]      Your next 10 appointments already scheduled     Mar 19, 2018 12:45 PM CDT   Return Visit with BROOKLYN Manriquez [facility])    [address] [address] [phone]              Further instructions from your care team        Behavioral Discharge Planning and Instructions      Summary:  You were admitted on 2/26/2018  due to Depression, auditory hallucinations and Suicidal Ideations.  You were treated by Dr Mahad Diez MD and discharged on 2/28/2018 from [facility].      Principal Diagnosis:     1. Schizoaffective disorder - bipolar type  2. PTSD  3. Suspect borderline personality disorder.   4. History of Marijuana use disorder.   5. History of opiate use disorder      Health Care Follow-up Appointments:     1.) [facility]    Appointment on Tuesday 3/6 /2018 @2:30pm with Sheila Adams    [address]  [place] [address]  Phone: [phone]  Fax [phone]    2.) BROOKLYN Merritt at [facility].  Appointment scheduled on 3/19/18 @ 12:45    "  [address] [place] [address]   Phone: [phone]               [place] Primary Care Provider,  Becki Avery.        Attend all scheduled appointments with your outpatient providers. Call at least 24 hours in advance if you need to reschedule an appointment to ensure continued access to your outpatient providers.   Major Treatments, Procedures and Findings:  You were provided with: a psychiatric assessment, assessed for medical stability, medication evaluation and/or management and milieu management    Symptoms to Report: mood getting worse, thoughts of suicide or hallucinations    Early warning signs can include: increased depression or anxiety sleep disturbances increased thoughts or behaviors of suicide or self-harm  increased unusual thinking, such as paranoia or hearing voices    Safety and Wellness:  Take all medicines as directed.  Make no changes unless your doctor suggests them.      Follow treatment recommendations.  Refrain from alcohol and non-prescribed drugs.  If there is a concern for safety, call 911.    Resources:   Crisis Intervention: 226.552.2263 or 603-363-2779 (TTY: 736.824.4290).  Call anytime for help.  National [place] on Mental Illness (www.[place].jhonatan.org): 645.568.5027 or 899-162-0913.  Alcoholics Anonymous (www.alcoholics-anonymous.org): Check your phone book for your local chapter.  Suicide Awareness Voices of Education (SAVE) (www.save.org): 761-291-EAVM (2784)  National Suicide Prevention Line (www.mentalhealthmn.org): 252-218-YYMX (4099)  Self- Management and Recovery Training., SMART-- Toll free: 369.563.9250  www.WizMeta.org  [facility] Crisis Response 792 221-8601  Text 4 Life: txt \"LIFE\" to 24568 for immediate support and crisis intervention  Crisis text line: Text \"START\" to 554-912. Free, confidential, 24/7.  Crisis Intervention: 418.960.5224 or 700-098-6118. Call anytime for help.     The treatment team has appreciated the opportunity to work with " you.     If you have any questions or concerns our unit number is [phone]  You may be receiving a follow-up phone call within the next three days from a representative from behavioral health.    You have identified the best phone number to reach you as [phone]          Pending Results     No orders found from 2/24/2018 to 2/27/2018.            Admission Information     Date & Time Provider Department Dept. Phone    2/26/2018 Mahad Diez MD [facility] 32NR [phone]      Your Vitals Were     Blood Pressure Pulse Temperature Respirations Weight BMI (Body Mass Index)    121/82 84 98.5  F (36.9  C) (Oral) 16 97.4 kg (214 lb 11.2 oz) 34.13 kg/m2      MyChart Information     [employer] gives you secure access to your electronic health record. If you see a primary care provider, you can also send messages to your care team and make appointments. If you have questions, please call your primary care clinic.  If you do not have a primary care provider, please call [phone] and they will assist you.        Care EveryWhere ID     This is your Care EveryWhere ID. This could be used by other organizations to access your [facility] medical records  [id number]        Equal Access to Services     PIPPA KOCH AH: Garfield Price, gogo augustine, tran beaulieu, fozia elizabeth. So [facility] [phone].    ATENCIÓN: Si habla español, tiene a xiong disposición servicios gratuitos de asistencia lingüística. Llame al [phone].    We comply with applicable federal civil rights laws and [place] laws. We do not discriminate on the basis of race, color, national origin, age, disability, sex, sexual orientation, or gender identity.               Review of your medicines      UNREVIEWED medicines. Ask your doctor about these medicines        Dose / Directions    ARIPiprazole 10 MG tablet   Commonly known as:  ABILIFY   Used for:  Schizoaffective disorder, bipolar type (H)         Dose:  10 mg   Take 1 tablet (10 mg) by mouth daily   Quantity:  90 tablet   Refills:  1       eszopiclone 3 MG tablet   Commonly known as:  LUNESTA   Used for:  Primary insomnia        Dose:  3 mg   Take 1 tablet (3 mg) by mouth At Bedtime   Quantity:  30 tablet   Refills:  2       gabapentin 100 MG capsule   Commonly known as:  NEURONTIN   Used for:  Anxiety, Numbness        Dose:  200 mg   Take 2 capsules (200 mg) by mouth 3 times daily   Quantity:  270 capsule   Refills:  1       lithium 300 MG CR tablet   Commonly known as:  ESKALITH/LITHOBID   Used for:  Schizoaffective disorder, bipolar type (H)        Two each evening and one each morning.   Quantity:  270 tablet   Refills:  1       prazosin 2 MG capsule   Commonly known as:  MINIPRESS   Used for:  PTSD (post-traumatic stress disorder)        Dose:  2 mg   Take 1 capsule (2 mg) by mouth At Bedtime   Quantity:  90 capsule   Refills:  1       propranolol 10 MG tablet   Commonly known as:  INDERAL   Used for:  Schizoaffective disorder, bipolar type (H), PTSD (post-traumatic stress disorder), Anxiety        Dose:  10 mg   Take 1 tablet (10 mg) by mouth 2 times daily   Quantity:  180 tablet   Refills:  1                Protect others around you: Learn how to safely use, store and throw away your medicines at www.disposemymeds.org.             Medication List: This is a list of all your medications and when to take them. Check marks below indicate your daily home schedule. Keep this list as a reference.      Medications           Morning Afternoon Evening Bedtime As Needed    ARIPiprazole 10 MG tablet   Commonly known as:  ABILIFY   Take 1 tablet (10 mg) by mouth daily   Last time this was given:  15 mg on 2/28/2018  8:20 AM                                eszopiclone 3 MG tablet   Commonly known as:  LUNESTA   Take 1 tablet (3 mg) by mouth At Bedtime   Last time this was given:  3 mg on 2/27/2018  9:22 PM                                gabapentin 100 MG capsule    Commonly known as:  NEURONTIN   Take 2 capsules (200 mg) by mouth 3 times daily   Last time this was given:  200 mg on 2/28/2018  8:19 AM                                lithium 300 MG CR tablet   Commonly known as:  ESKALITH/LITHOBID   Two each evening and one each morning.   Last time this was given:  600 mg on 2/27/2018  9:22 PM                                prazosin 2 MG capsule   Commonly known as:  MINIPRESS   Take 1 capsule (2 mg) by mouth At Bedtime   Last time this was given:  2 mg on 2/27/2018  9:22 PM                                propranolol 10 MG tablet   Commonly known as:  INDERAL   Take 1 tablet (10 mg) by mouth 2 times daily   Last time this was given:  10 mg on 2/28/2018  8:19 AM
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
## 2018-02-26 NOTE — IP AVS SNAPSHOT
[address]    [address]    [facility] [address]    Phone:  [phone]                                       After Visit Summary   2/26/2018    Ayana Prasad    MRN: [id number]           After Visit Summary Signature Page     I have received my discharge instructions, and my questions have been answered. I have discussed any challenges I see with this plan with the nurse or doctor.    ..........................................................................................................................................  Patient/Patient Representative Signature      ..........................................................................................................................................  Patient Representative Print Name and Relationship to Patient    ..................................................               ................................................  Date                                            Time    ..........................................................................................................................................  Reviewed by Signature/Title    ...................................................              ..............................................  Date                                                            Time

## 2018-02-26 NOTE — ED NOTES
Patient's wife left, patient resting on cot and appears to be falling asleep still denies ability to urinate.  Security remains outside of room.

## 2018-02-26 NOTE — ED NOTES
Patient hear with wife and states has had depression for past two weeks, started hearing voices for past two days.  Voices telling her to do things, sometimes to hurt herself sometimes just behaviors. Patient unable to communicate very well. Wife speaking for patient. Patient has a hard time understanding questions and expressing self.

## 2018-02-27 VITALS
TEMPERATURE: 98.5 F | BODY MASS INDEX: 34.13 KG/M2 | WEIGHT: 214.7 LBS | SYSTOLIC BLOOD PRESSURE: 121 MMHG | DIASTOLIC BLOOD PRESSURE: 82 MMHG | RESPIRATION RATE: 16 BRPM | HEART RATE: 84 BPM

## 2018-02-27 PROCEDURE — 12400007 ZZH R&B MH INTERMEDIATE UMMC

## 2018-02-27 PROCEDURE — 25000132 ZZH RX MED GY IP 250 OP 250 PS 637: Performed by: PSYCHIATRY & NEUROLOGY

## 2018-02-27 PROCEDURE — 99231 SBSQ HOSP IP/OBS SF/LOW 25: CPT | Performed by: PSYCHIATRY & NEUROLOGY

## 2018-02-27 RX ORDER — LITHIUM CARBONATE 450 MG
900 TABLET, EXTENDED RELEASE ORAL AT BEDTIME
Status: DISCONTINUED | OUTPATIENT
Start: 2018-02-28 | End: 2018-02-28 | Stop reason: HOSPADM

## 2018-02-27 RX ORDER — LITHIUM CARBONATE 300 MG/1
600 TABLET, FILM COATED, EXTENDED RELEASE ORAL AT BEDTIME
Status: COMPLETED | OUTPATIENT
Start: 2018-02-27 | End: 2018-02-27

## 2018-02-27 RX ADMIN — ARIPIPRAZOLE 15 MG: 10 TABLET ORAL at 08:49

## 2018-02-27 RX ADMIN — GABAPENTIN 200 MG: 100 CAPSULE ORAL at 21:21

## 2018-02-27 RX ADMIN — PRAZOSIN HYDROCHLORIDE 2 MG: 1 CAPSULE ORAL at 21:22

## 2018-02-27 RX ADMIN — TRETINOIN: 0.05 CREAM TOPICAL at 21:23

## 2018-02-27 RX ADMIN — LITHIUM CARBONATE 300 MG: 300 TABLET, EXTENDED RELEASE ORAL at 08:49

## 2018-02-27 RX ADMIN — LITHIUM CARBONATE 600 MG: 300 TABLET, EXTENDED RELEASE ORAL at 21:22

## 2018-02-27 RX ADMIN — GABAPENTIN 200 MG: 100 CAPSULE ORAL at 14:42

## 2018-02-27 RX ADMIN — MIRTAZAPINE 7.5 MG: 7.5 TABLET ORAL at 21:21

## 2018-02-27 RX ADMIN — ESZOPICLONE 3 MG: 1 TABLET, COATED ORAL at 21:22

## 2018-02-27 RX ADMIN — PROPRANOLOL HYDROCHLORIDE 10 MG: 10 TABLET ORAL at 08:49

## 2018-02-27 RX ADMIN — PROPRANOLOL HYDROCHLORIDE 10 MG: 10 TABLET ORAL at 21:21

## 2018-02-27 RX ADMIN — GABAPENTIN 200 MG: 100 CAPSULE ORAL at 08:49

## 2018-02-27 ASSESSMENT — ACTIVITIES OF DAILY LIVING (ADL)
GROOMING: HANDWASHING;SHOWER;INDEPENDENT
ORAL_HYGIENE: INDEPENDENT
DRESS: STREET CLOTHES;INDEPENDENT
LAUNDRY: UNABLE TO COMPLETE
GROOMING: INDEPENDENT
LAUNDRY: UNABLE TO COMPLETE
ORAL_HYGIENE: INDEPENDENT
DRESS: INDEPENDENT
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
## 2018-02-27 NOTE — PLAN OF CARE
"Problem: Patient Care Overview  Goal: Plan of Care/Patient Progress Review  Outcome: No Change  Ayana has been isolative and withdrawn to her bedroom for majority of the shift. She refused to eat dinner despite multiple prompts from staff and writer. Patient denies any anxiety and depression. When asked how she feels she says \"I don't know.\" Patient denies any current SI/SIB. Patient did not attend groups. She has been med compliant. She did have a visitor this evening, her wife. She said it went well. She has had numerous phone calls this shift. No other behavioral concerns at this time. Will continue to monitor and assess.      "

## 2018-02-27 NOTE — PROGRESS NOTES
"[facility], [place]   Psychiatric Progress Note        Interim History:   Reason for Hospitalization:Ayana is a 27 year old female with history of Schizoaffective Disorder, PTSD, Borderline Personality Disorder, Cannabis and Alcohol Use Disorder who was admitted on a voluntary basis for worsening depressive symptoms, suicidal ideation, and AH that were commanding.    Subjective: \"The voices are better\"     As per today's interview: Patient was seen in her room again today, but was relatively more engaged in conversation today. She felt the AH were much less in intensity, and were not commanding. Mood was relatively improved, and denied SI, intent or plan.          Medications:       [START ON 2/28/2018] lithium  900 mg Oral At Bedtime     lithium  600 mg Oral At Bedtime     influenza quadrivalent (PF) vacc age 3 yrs and older  0.5 mL Intramuscular Prior to discharge     ARIPiprazole  15 mg Oral Daily     eszopiclone  3 mg Oral At Bedtime     gabapentin  200 mg Oral TID     prazosin  2 mg Oral At Bedtime     propranolol  10 mg Oral BID     tretinoin   Topical At Bedtime     mirtazapine  7.5 mg Oral At Bedtime          Allergies:     Allergies   Allergen Reactions     Adhesive Tape Hives     Prednisone Other (See Comments) and Hives     Suicidal ideation     Droperidol Anxiety          Labs:     Recent Results (from the past 48 hour(s))   Acetaminophen level    Collection Time: 02/26/18  3:26 AM   Result Value Ref Range    Acetaminophen Level <2 mg/L   Alcohol ethyl    Collection Time: 02/26/18  3:26 AM   Result Value Ref Range    Ethanol g/dL <0.01 <0.01 g/dL   Basic metabolic panel    Collection Time: 02/26/18  3:26 AM   Result Value Ref Range    Sodium 136 133 - 144 mmol/L    Potassium 3.9 3.4 - 5.3 mmol/L    Chloride 107 94 - 109 mmol/L    Carbon Dioxide 22 20 - 32 mmol/L    Anion Gap 7 3 - 14 mmol/L    Glucose 106 (H) 70 - 99 mg/dL    Urea Nitrogen 14 7 - 30 mg/dL    Creatinine 0.73 " 0.52 - 1.04 mg/dL    GFR Estimate >90 >60 mL/min/1.7m2    GFR Estimate If Black >90 >60 mL/min/1.7m2    Calcium 9.3 8.5 - 10.1 mg/dL   CBC with platelets differential    Collection Time: 02/26/18  3:26 AM   Result Value Ref Range    WBC 13.4 (H) 4.0 - 11.0 10e9/L    RBC Count 5.36 (H) 3.8 - 5.2 10e12/L    Hemoglobin 15.1 11.7 - 15.7 g/dL    Hematocrit 44.5 35.0 - 47.0 %    MCV 83 78 - 100 fl    MCH 28.2 26.5 - 33.0 pg    MCHC 33.9 31.5 - 36.5 g/dL    RDW 13.7 10.0 - 15.0 %    Platelet Count 395 150 - 450 10e9/L    Diff Method Automated Method     % Neutrophils 68.9 %    % Lymphocytes 23.9 %    % Monocytes 4.9 %    % Eosinophils 1.6 %    % Basophils 0.5 %    % Immature Granulocytes 0.2 %    Absolute Neutrophil 9.2 (H) 1.6 - 8.3 10e9/L    Absolute Lymphocytes 3.2 0.8 - 5.3 10e9/L    Absolute Monocytes 0.7 0.0 - 1.3 10e9/L    Absolute Eosinophils 0.2 0.0 - 0.7 10e9/L    Absolute Basophils 0.1 0.0 - 0.2 10e9/L    Abs Immature Granulocytes 0.0 0 - 0.4 10e9/L   Salicylate level    Collection Time: 02/26/18  3:26 AM   Result Value Ref Range    Salicylate Level <2 mg/dL   Lithium level    Collection Time: 02/26/18  3:26 AM   Result Value Ref Range    Lithium Level 0.41 (L) 0.60 - 1.20 mmol/L          Psychiatric Examination:   /88  Pulse 82  Temp 98.2  F (36.8  C) (Oral)  Resp 17  Wt 97.4 kg (214 lb 11.2 oz)  BMI 34.13 kg/m2  Weight is 214 lbs 11.2 oz  Body mass index is 34.13 kg/(m^2).    Appearance: Cacasian female, laying in bed, sitting up in bed. No acute distresss.   Attitude:  More engaged in conversation, pleasant.   Eye Contact:  Improved   Mood:  Mildly depressed   Affect:  intensity is flat  Speech:  Regular in rate, soft in tone.   Language: fluent and intact in English  Psychomotor, Gait, Musculoskeletal:  physical retardation  Throught Process:  logical and linear  Associations:  no loose associations  Thought Content:  Auditory hallucinations decreased. No suicidal ideation, no intent or plan. No  VH or HI.   Insight:  Fair   Judgement:  Fair   Oriented to:  Person, place, time   Attention Span and Concentration:  Improved   Recent and Remote Memory:  Unclear   Fund of Knowledge:  Average       Assessment & Plan       Principal Diagnosis:   1. Schizoaffective disorder - bipolar type  2. PTSD  3. Suspect borderline personality disorder.   4. History of Marijuana use disorder.   5. History of opiate use disorder    Assessment:  (Initial Assessment 2/26): Patient's AH could be due to possible manic/psychotic mechanisms, but more prominently other mechanisms, such as high anxiety, PTSD symptoms (dissociative traits, paranoid ideation, ruminative thoughts), and possible substance use (utox was not completed for some reason). Her current AH are not commanding but she still harbors some passive SI. She seems to have some symptoms of neurovegetative depression, such as lack of appetite, lack of volitional drive, extreme fatigue, all of which are impacting her daily functionality.     Update 2/27: Decreased AH today, less intense voices. Patient appeared to be more engaged in the interview today, with improved reciprocity. Reports indicate that she went to groups today, and was pleasant upon approach. Patient seems established with outpatient therapy services, although seems to be interested with day program (as per reports). She would benefit with more structure/routine in her life.      Plan:  - Consolidate Lithium to 900 mg PM (starting tmw night)  - Remeron 7.5 mg HS  - Prazosin 2 mg HS  - Propranolol 10 mg BID   - Gabapentin 200 mg TID  - Abilify 15 mg   - Lunesta 3 mg HS      Legal:  Voluntary      Disposition:  Unclear at this point          Mahad Diez MD  [facility]iat[facility]
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
## 2018-02-28 PROCEDURE — 25000132 ZZH RX MED GY IP 250 OP 250 PS 637: Performed by: PSYCHIATRY & NEUROLOGY

## 2018-02-28 PROCEDURE — 99239 HOSP IP/OBS DSCHRG MGMT >30: CPT | Performed by: NURSE PRACTITIONER

## 2018-02-28 RX ORDER — ESZOPICLONE 3 MG/1
3 TABLET, FILM COATED ORAL AT BEDTIME
Qty: 30 TABLET | Refills: 1 | Status: SHIPPED | OUTPATIENT
Start: 2018-02-28 | End: 2018-08-28

## 2018-02-28 RX ORDER — MIRTAZAPINE 7.5 MG/1
7.5 TABLET, FILM COATED ORAL AT BEDTIME
Qty: 30 TABLET | Refills: 1 | Status: SHIPPED | OUTPATIENT
Start: 2018-02-28 | End: 2018-08-28

## 2018-02-28 RX ORDER — PRAZOSIN HYDROCHLORIDE 2 MG/1
2 CAPSULE ORAL AT BEDTIME
Qty: 30 CAPSULE | Refills: 1 | Status: SHIPPED | OUTPATIENT
Start: 2018-02-28 | End: 2018-08-28

## 2018-02-28 RX ORDER — TRETINOIN 0.5 MG/G
CREAM TOPICAL AT BEDTIME
Status: ON HOLD | COMMUNITY
Start: 2018-02-28 | End: 2018-10-22

## 2018-02-28 RX ORDER — GABAPENTIN 100 MG/1
200 CAPSULE ORAL 3 TIMES DAILY
Qty: 180 CAPSULE | Refills: 1 | Status: SHIPPED | OUTPATIENT
Start: 2018-02-28 | End: 2018-08-28

## 2018-02-28 RX ORDER — LITHIUM CARBONATE 450 MG
900 TABLET, EXTENDED RELEASE ORAL AT BEDTIME
Qty: 60 TABLET | Refills: 1 | Status: SHIPPED | OUTPATIENT
Start: 2018-02-28 | End: 2018-08-28

## 2018-02-28 RX ORDER — ARIPIPRAZOLE 15 MG/1
15 TABLET ORAL DAILY
Qty: 30 TABLET | Refills: 1 | Status: SHIPPED | OUTPATIENT
Start: 2018-03-01 | End: 2018-08-28

## 2018-02-28 RX ADMIN — GABAPENTIN 200 MG: 100 CAPSULE ORAL at 08:19

## 2018-02-28 RX ADMIN — ARIPIPRAZOLE 15 MG: 10 TABLET ORAL at 08:20

## 2018-02-28 RX ADMIN — PROPRANOLOL HYDROCHLORIDE 10 MG: 10 TABLET ORAL at 08:19

## 2018-02-28 ASSESSMENT — ACTIVITIES OF DAILY LIVING (ADL)
ORAL_HYGIENE: INDEPENDENT
DRESS: INDEPENDENT
GROOMING: INDEPENDENT

## 2018-02-28 NOTE — PLAN OF CARE
Problem: General Plan of Care (Inpatient Behavioral)  Goal: Team Discussion  Team Plan:    BEHAVIORAL TEAM DISCUSSION    Participants: Hanane Sheikh Bette Groven RN (team meeting occurred yesterday 2/27/18, late entry)  Progress: Pt is a 27 year old female with history of Schizoaffective Disorder, PTSD, Borderline Personality Disorder, Cannabis and Alcohol Use Disorder who was admitted on a voluntary basis for worsening depressive symptoms, suicidal ideation, and AH that were commanding  Continued Stay Criteria/Rationale: pt requiring further assessment.  Medical/Physical: see chart  Precautions:   Behavioral Orders   Procedures     Code 1 - Restrict to Unit     Routine Programming     As clinically indicated     Status 15     Every 15 minutes.     Plan: assess, monitor and stabilize  Rationale for change in precautions or plan: newly admitted pt       Problem: Patient Care Overview  Goal: Team Discussion  Team Plan:    BEHAVIORAL TEAM DISCUSSION    Participants: Hanane Sheikh Bette Groven RN (team meeting occurred yesterday 2/27/18, late entry)  Progress: Pt is a 27 year old female with history of Schizoaffective Disorder, PTSD, Borderline Personality Disorder, Cannabis and Alcohol Use Disorder who was admitted on a voluntary basis for worsening depressive symptoms, suicidal ideation, and AH that were commanding  Continued Stay Criteria/Rationale: pt requiring further assessment.  Medical/Physical: see chart  Precautions:   Behavioral Orders   Procedures     Code 1 - Restrict to Unit     Routine Programming     As clinically indicated     Status 15     Every 15 minutes.     Plan: assess, monitor and stabilize  Rationale for change in precautions or plan: newly admitted pt

## 2018-02-28 NOTE — DISCHARGE SUMMARY
"Psychiatric Discharge Summary    Ayana Prasad MRN# [id number]   Age: 27 year old YOB: 1990     Date of Admission:  2/26/2018  Date of Discharge:  2/28/2018  Admitting Physician:  Mahad Diez MD  Discharge Physician:  BROOKLYN Stevens CNP (Contact: [phone])         Event Leading to Hospitalization:      Ayana is a 27 year old female with history of Schizoaffective Disorder, PTSD, Borderline Personality Disorder, Cannabis and Alcohol Use Disorder who was admitted on a voluntary basis for worsening depressive symptoms, suicidal ideation, and AH that were commanding. Patient was recently hospitalized in May 2017, which lead to a commitment (with a subsetute decision maker), and subsequent continued treatment at [facility] for [facility] months. Afterwards she was at home with her parents, and describes the environment as \"fine.\" She mentions that she had been compliant with her medications. She denies any recent drug or alcohol use. She mentions that she recently started hearing voices that initially were commanding to ending her life, but later were just \"mumbles.\" She currently still hears voices, but they are just random voices and cannot discern any commanding quality to them. She has passive suicidal ideation without any current plan. She also has lack of appetite, anhedonia, fatigue, poor concentration, and rumination on past trauma. She has trouble sleeping due to recurrent images and thoughts and feelings related to past traumatic events.          Diagnoses:     1. Schizoaffective disorder - bipolar type  2. PTSD  3. Suspect borderline personality disorder.   4. History of cannabis use disorder.   5. History of opiate use disorder  6. Acne         Labs:      Ref. Range 2/26/2018 03:26   Sodium Latest Ref Range: 133 - 144 mmol/L 136   Potassium Latest Ref Range: 3.4 - 5.3 mmol/L 3.9   Chloride Latest Ref Range: 94 - 109 mmol/L 107   Carbon Dioxide Latest Ref Range: 20 - 32 " mmol/L 22   Urea Nitrogen Latest Ref Range: 7 - 30 mg/dL 14   Creatinine Latest Ref Range: 0.52 - 1.04 mg/dL 0.73   GFR Estimate Latest Ref Range: >60 mL/min/1.7m2 >90   GFR Estimate If Black Latest Ref Range: >60 mL/min/1.7m2 >90   Calcium Latest Ref Range: 8.5 - 10.1 mg/dL 9.3   Anion Gap Latest Ref Range: 3 - 14 mmol/L 7   Glucose Latest Ref Range: 70 - 99 mg/dL 106 (H)   WBC Latest Ref Range: 4.0 - 11.0 10e9/L 13.4 (H)   Hemoglobin Latest Ref Range: 11.7 - 15.7 g/dL 15.1   Hematocrit Latest Ref Range: 35.0 - 47.0 % 44.5   Platelet Count Latest Ref Range: 150 - 450 10e9/L 395   RBC Count Latest Ref Range: 3.8 - 5.2 10e12/L 5.36 (H)   MCV Latest Ref Range: 78 - 100 fl 83   MCH Latest Ref Range: 26.5 - 33.0 pg 28.2   MCHC Latest Ref Range: 31.5 - 36.5 g/dL 33.9   RDW Latest Ref Range: 10.0 - 15.0 % 13.7   Diff Method Unknown Automated Method   % Neutrophils Latest Units: % 68.9   % Lymphocytes Latest Units: % 23.9   % Monocytes Latest Units: % 4.9   % Eosinophils Latest Units: % 1.6   % Basophils Latest Units: % 0.5   % Immature Granulocytes Latest Units: % 0.2   Absolute Neutrophil Latest Ref Range: 1.6 - 8.3 10e9/L 9.2 (H)   Absolute Lymphocytes Latest Ref Range: 0.8 - 5.3 10e9/L 3.2   Absolute Monocytes Latest Ref Range: 0.0 - 1.3 10e9/L 0.7   Absolute Eosinophils Latest Ref Range: 0.0 - 0.7 10e9/L 0.2   Absolute Basophils Latest Ref Range: 0.0 - 0.2 10e9/L 0.1   Abs Immature Granulocytes Latest Ref Range: 0 - 0.4 10e9/L 0.0   Acetaminophen Level Latest Units: mg/L <2   Ethanol g/dL Latest Ref Range: <0.01 g/dL <0.01   Lithium Level Latest Ref Range: 0.60 - 1.20 mmol/L 0.41 (L)   Salicylate Level Latest Units: mg/dL <2            Consults:     No consultations were requested during this admission.         Hospital Course:     Ayana Prasad was admitted to [facility] with attending Mahad Diez MD as a voluntary patient. The patient was placed under status 15 (15 minute checks) to ensure patient  safety.     Propranolol was discontinued due to reported lack of efficacy.  Abilify was increased.  Prazosin, Lithium, Neurontin and Lunesta were continued.  Remeron was added.  She tolerated her medications well, without complaints of side effects.      Ayana Prasad did not participate in groups and was rarely visible in the milieu.  Behavior was calm and cooperative.      The patient's symptoms of depression improved.  She denied suicidal ideation.  She was hopeful and future-oriented.  She denied feeling anxious.  Auditory hallucinations were reduced.  Sleep was improved.      Ayana Prasad signed a 12-hour intent to leave on her attending provider's day off and was seen by the covering provider.  She was released to home. At the time of discharge Ayana Prasad was determined to not be a danger to herself or others.          Discharge Medications:      Ayana Prasad   Home Medication Instructions KRISTAN:[id number]    Printed on:02/28/18 1208   Medication Information                      ARIPiprazole (ABILIFY) 15 MG tablet  Take 1 tablet (15 mg) by mouth daily             eszopiclone (LUNESTA) 3 MG tablet  Take 1 tablet (3 mg) by mouth At Bedtime  #30 dispensed, 1 refill           gabapentin (NEURONTIN) 100 MG capsule  Take 2 capsules (200 mg) by mouth 3 times daily             lithium (ESKALITH) 450 MG CR tablet  Take 2 tablets (900 mg) by mouth At Bedtime             mirtazapine (REMERON) 7.5 MG TABS tablet  Take 1 tablet (7.5 mg) by mouth At Bedtime             prazosin (MINIPRESS) 2 MG capsule  Take 1 capsule (2 mg) by mouth At Bedtime             tretinoin (RETIN-A) 0.05 % cream  Apply topically At Bedtime                      Psychiatric Examination:     /82  Pulse 84  Temp 98.5  F (36.9  C) (Oral)  Resp 16  Wt 97.4 kg (214 lb 11.2 oz)  BMI 34.13 kg/m2     Appearance:  female, awake, alert, adequate grooming/hygiene, in no apparent distress  Attitude:  engaged in conversation,  "pleasant.   Eye Contact:  fair  Mood:  \"okay\" mildly depressed   Affect:  intensity is blunted  Speech:  Regular in rate, soft in tone.   Language: fluent and intact in English  Psychomotor,Gait, Musculoskeletal:  physical retardation  Throught Process:  logical and linear  Associations:  no loose associations  Thought Content:  Auditory hallucinations decreased. No suicidal ideation. No VH or HI.   Insight:  Fair   Judgement:  Fair   Oriented to:  Person, place, time, date  Attention Span and Concentration:  Improved   Recent and Remote Memory:  Unclear   Fund of Knowledge:  Average            Discharge Plan:     Per Discharge AVS:    Health Care Follow-up Appointments:     1.) [facility]    Appointment on Tuesday 3/6 /2018 @2:30pm with Sheila Del Castilloundson    [address]  [place] [address]  Phone: [phone]  Fax [phone]    2.) BROOKLYN Merritt at [facility].  Appointment scheduled on 3/19/18 @ 12:45     [address] [place] [address]   Phone: [phone]               [place] Primary Care Provider,  Becki Avery.        She was provided with a 30-day supply of medications plus one refill.  She was advised to take her medications as prescribed and to abstain from alcohol and illicit substance.        Attestation:  The patient has been seen and evaluated by me,  BROOLKYN Stevens CNP   Discharge time > 30 minutes  "

## 2018-02-28 NOTE — PLAN OF CARE
"Problem: Patient Care Overview  Goal: Individualization & Mutuality  The patient completed the reasons for admit and goals for discharge in the personal plan of care.   Reasons for admit:  1)  \"wife\"      Goals for discharge:  1)  \"Go home today\"            "

## 2018-02-28 NOTE — DISCHARGE INSTRUCTIONS
Behavioral Discharge Planning and Instructions      Summary:  You were admitted on 2/26/2018  due to Depression, auditory hallucinations and Suicidal Ideations.  You were treated by Dr Mahad Diez MD and discharged on 2/28/2018 from [facility] Home.      Principal Diagnosis:     1. Schizoaffective disorder - bipolar type  2. PTSD  3. Suspect borderline personality disorder.   4. History of Marijuana use disorder.   5. History of opiate use disorder      Health Care Follow-up Appointments:     1.) [facility]    Appointment on Tuesday 3/6 /2018 @2:30pm with Sheila Adams    [address]  [place] [address]  Phone: [phone]  Fax [phone]    2.) BROOKLYN Merritt at [facility].  Appointment scheduled on 3/19/18 @ 12:45     [address] [place] [address]   Phone: [phone]               [place] Primary Care Provider,  Becki Avery.        Attend all scheduled appointments with your outpatient providers. Call at least 24 hours in advance if you need to reschedule an appointment to ensure continued access to your outpatient providers.   Major Treatments, Procedures and Findings:  You were provided with: a psychiatric assessment, assessed for medical stability, medication evaluation and/or management and milieu management    Symptoms to Report: mood getting worse, thoughts of suicide or hallucinations    Early warning signs can include: increased depression or anxiety sleep disturbances increased thoughts or behaviors of suicide or self-harm  increased unusual thinking, such as paranoia or hearing voices    Safety and Wellness:  Take all medicines as directed.  Make no changes unless your doctor suggests them.      Follow treatment recommendations.  Refrain from alcohol and non-prescribed drugs.  If there is a concern for safety, call 911.    Resources:   Crisis Intervention: 920.152.7164 or 317-842-0161 (TTY: 357.203.4411).  Call anytime for  "help.  National [place] [employer] Mental Illness (www.[place].jhonatan.org): 975.679.9705 or 033-687-5363.  Alcoholics Anonymous (www.alcoholics-anonymous.org): Check your phone book for your local chapter.  Suicide Awareness Voices of Education (SAVE) (www.save.org): 098-205-OQVO (3883)  National Suicide Prevention Line (www.mentalhealthmn.org): 498-651-APOE (6604)  Self- Management and Recovery Training., SMART-- Toll free: 204.363.3872  www.[employer]vanDx  [facility] Crisis Response 809 524-5129  Text 4 Life: txt \"LIFE\" to 14637 for immediate support and crisis intervention  Crisis text line: Text \"START\" to 278-121. Free, confidential, 24/7.  Crisis Intervention: 996.532.4505 or 311-192-6446. Call anytime for help.     The treatment team has appreciated the opportunity to work with you.     If you have any questions or concerns our unit number is [phone]  You may be receiving a follow-up phone call within the next three days from a representative from behavioral health.    You have identified the best phone number to reach you as [phone]        "

## 2018-03-07 PROBLEM — E66.09 CLASS 2 OBESITY DUE TO EXCESS CALORIES IN ADULT: Status: ACTIVE | Noted: 2018-03-07

## 2018-03-07 PROBLEM — E66.812 CLASS 2 OBESITY DUE TO EXCESS CALORIES IN ADULT: Status: ACTIVE | Noted: 2018-03-07

## 2018-03-19 ENCOUNTER — OFFICE VISIT (OUTPATIENT)
Dept: PSYCHIATRY | Facility: CLINIC | Age: 28
End: 2018-03-19
Payer: COMMERCIAL

## 2018-03-19 ENCOUNTER — TELEPHONE (OUTPATIENT)
Dept: PHARMACY | Facility: OTHER | Age: 28
End: 2018-03-19

## 2018-03-19 VITALS
HEART RATE: 108 BPM | SYSTOLIC BLOOD PRESSURE: 139 MMHG | WEIGHT: 221 LBS | DIASTOLIC BLOOD PRESSURE: 91 MMHG | BODY MASS INDEX: 35.14 KG/M2 | TEMPERATURE: 99.5 F

## 2018-03-19 DIAGNOSIS — F43.10 PTSD (POST-TRAUMATIC STRESS DISORDER): ICD-10-CM

## 2018-03-19 DIAGNOSIS — F25.0 SCHIZOAFFECTIVE DISORDER, BIPOLAR TYPE (H): Primary | ICD-10-CM

## 2018-03-19 PROCEDURE — 99214 OFFICE O/P EST MOD 30 MIN: CPT | Performed by: CLINICAL NURSE SPECIALIST

## 2018-03-19 ASSESSMENT — ANXIETY QUESTIONNAIRES
2. NOT BEING ABLE TO STOP OR CONTROL WORRYING: NOT AT ALL
6. BECOMING EASILY ANNOYED OR IRRITABLE: NOT AT ALL
7. FEELING AFRAID AS IF SOMETHING AWFUL MIGHT HAPPEN: NOT AT ALL
5. BEING SO RESTLESS THAT IT IS HARD TO SIT STILL: NOT AT ALL
1. FEELING NERVOUS, ANXIOUS, OR ON EDGE: NOT AT ALL
3. WORRYING TOO MUCH ABOUT DIFFERENT THINGS: NOT AT ALL
GAD7 TOTAL SCORE: 0

## 2018-03-19 ASSESSMENT — PATIENT HEALTH QUESTIONNAIRE - PHQ9: 5. POOR APPETITE OR OVEREATING: NOT AT ALL
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
## 2018-03-19 NOTE — MR AVS SNAPSHOT
After Visit Summary   3/19/2018    Ayana Prasad    MRN: [id number]           Patient Information     Date Of Birth          1990        Visit Information        Provider Department      3/19/2018 12:45 PM Lesa Matthew APRN [facility]        Today's Diagnoses     Schizoaffective disorder, bipolar type (H)    -  1      Care Instructions    Treatment Plan:  Referral for MTM pharmacist.     Continue prazosin 2 mg at bedtime, mirtazapine (Remeron) 7.5 mg at bedtime, lithium 900 mg at bedtime, Lunesta 3 mg at bedtime and aripiprazole (Abilify) 15 mg daily.     Follow up as needed. Refills from primary care provider.     - Recommend patient discuss medications with their pharmacist. Risks and benefits for medications were discussed including, but not limited to, side effects.   - Safety plan was reviewed; to the ER as needed or call after hours crisis line; [phone]  - Education and counseling was done regarding use of medications, psychotherapy options  - Call [phone] for appointment or to speak to a nurse.    -Office hours: Monday through Thursday 8:00 am to 4:30 pm.             Follow-ups after your visit        Additional Services     MED THERAPY MANAGE REFERRAL       Your provider has referred you to: **[place] [facility] Scheduling (numerous locations) [phone]   [email or web address]  [facility]: [facility] [phone]   [email or web address]    Reason for Referral: Psychotropic medications.     The [place] [facility] department will contact you to schedule an appointment.  You may also schedule the appointment by calling [phone].  For [place] [facility] patients, please call [phone] to confirm/schedule your appointment on the next business day.    This service is designed to help you get  the most from your medications.  A specially trained Pharmacist will work closely with you and your providers to solve any questions, concerns, issues or problems related to your medications.    Please bring all of your prescription and non-prescription medications (such as vitamins, over-the-counter medications, and herbals) or a detailed medication list to your appointment.    If you have a glucose meter or other home monitoring information, please also bring this to your appointment (i.e. blood glucose log, blood pressure log, pain log, etc.).                  Who to contact     If you have questions or need follow up information about today's clinic visit or your schedule please contact [facility] directly at [phone].  Normal or non-critical lab and imaging results will be communicated to you by MyChart, letter or phone within 4 business days after the clinic has received the results. If you do not hear from us within 7 days, please contact the clinic through [facility] or phone. If you have a critical or abnormal lab result, we will notify you by phone as soon as possible.  Submit refill requests through [facility] or call your pharmacy and they will forward the refill request to us. Please allow 3 business days for your refill to be completed.          Additional Information About Your Visit        [facility] Information     [facility] gives you secure access to your electronic health record. If you see a primary care provider, you can also send messages to your care team and make appointments. If you have questions, please call your primary care clinic.  If you do not have a primary care provider, please call [phone] and they will assist you.        Care EveryWhere ID     This is your Care EveryWhere ID. This could be used by other organizations to access your [place] medical records  [id number]        Your Vitals Were     Pulse Temperature BMI (Body Mass Index)             108 99.5  F (37.5   C) (Tympanic) 35.14 kg/m2          Blood Pressure from Last 3 Encounters:   03/19/18 (!) 139/91   02/27/18 121/82   02/26/18 (!) 135/96    Weight from Last 3 Encounters:   03/19/18 221 lb (100.2 kg)   02/27/18 214 lb 11.2 oz (97.4 kg)   02/26/18 200 lb (90.7 kg)              We Performed the Following     MED THERAPY MANAGE REFERRAL        Primary Care Provider Office Phone # Fax #    Becki Stacie Avery, BROOKLYN [phone]       [facility] [address] [place] [address]        Equal Access to Services     PIPPA KOCH : Hadii aad ku hadasho Soomaali, waaxda luqadaha, qaybta kaalmada adeegyada, fozia roca . So [facility] [phone].    ATENCIÓN: Si habla español, tiene a xiong disposición servicios gratuitos de asistencia lingüística. Llame al [phone].    We comply with applicable federal civil rights laws and [place] laws. We do not discriminate on the basis of race, color, national origin, age, disability, sex, sexual orientation, or gender identity.            Thank you!     Thank you for choosing [facility]  for your care. Our goal is always to provide you with excellent care. Hearing back from our patients is one way we can continue to improve our services. Please take a few minutes to complete the written survey that you may receive in the mail after your visit with us. Thank you!             Your Updated Medication List - Protect others around you: Learn how to safely use, store and throw away your medicines at www.disposemymeds.org.          This list is accurate as of 3/19/18  1:24 PM.  Always use your most recent med list.                   Brand Name Dispense Instructions for use Diagnosis    ARIPiprazole 15 MG tablet    ABILIFY    30 tablet    Take 1 tablet (15 mg) by mouth daily    Schizoaffective disorder, bipolar type (H)       eszopiclone 3 MG tablet    LUNESTA    30 tablet    Take 1 tablet (3 mg) by mouth At  Bedtime    Schizoaffective disorder, bipolar type (H)       gabapentin 100 MG capsule    NEURONTIN    180 capsule    Take 2 capsules (200 mg) by mouth 3 times daily    Schizoaffective disorder, bipolar type (H)       lithium 450 MG CR tablet    ESKALITH    60 tablet    Take 2 tablets (900 mg) by mouth At Bedtime    Schizoaffective disorder, bipolar type (H)       mirtazapine 7.5 MG Tabs tablet    REMERON    30 tablet    Take 1 tablet (7.5 mg) by mouth At Bedtime    Schizoaffective disorder, bipolar type (H)       prazosin 2 MG capsule    MINIPRESS    30 capsule    Take 1 capsule (2 mg) by mouth At Bedtime    Schizoaffective disorder, bipolar type (H)       tretinoin 0.05 % cream    RETIN-A     Apply topically At Bedtime

## 2018-03-19 NOTE — TELEPHONE ENCOUNTER
MTM referral from: [facility] clinic visit (referral by provider)    MTM referral outreach attempt #1 on March 19, 2018 at 3:18 PM      Outcome: Left Message    Susannah Hurtado MTM Coordinator

## 2018-03-19 NOTE — NURSING NOTE
"Chief Complaint   Patient presents with     Recheck Medication       Initial BP (!) 139/91 (BP Location: Right arm, Patient Position: Sitting, Cuff Size: Adult Regular)  Pulse 108  Temp 99.5  F (37.5  C) (Tympanic)  Wt 221 lb (100.2 kg)  BMI 35.14 kg/m2 Estimated body mass index is 35.14 kg/(m^2) as calculated from the following:    Height as of 2/8/18: 5' 6.5\" (1.689 m).    Weight as of this encounter: 221 lb (100.2 kg).  Medication Reconciliation: complete    "

## 2018-03-20 ASSESSMENT — PATIENT HEALTH QUESTIONNAIRE - PHQ9: SUM OF ALL RESPONSES TO PHQ QUESTIONS 1-9: 0

## 2018-03-20 ASSESSMENT — ANXIETY QUESTIONNAIRES: GAD7 TOTAL SCORE: 0

## 2018-03-20 NOTE — TELEPHONE ENCOUNTER
MTM referral from: [facility] visit (referral by provider)    MTM referral outreach attempt #2 on March 20, 2018 at 2:03 PM      Outcome: Patient scheduled for MTM appointment on 04/04/2018    Susannah Hurtado MTM Coordinator

## 2018-03-30 DIAGNOSIS — F25.0 SCHIZOAFFECTIVE DISORDER, BIPOLAR TYPE (H): ICD-10-CM

## 2018-03-30 NOTE — TELEPHONE ENCOUNTER
Per insurance, pt is restricted to [facility] in [place] and [facility] in [place]. Patient was restricted to Dr. Ragsdale but is working with insurance to get it changed to Becki Avery (pt's pcp). Once changed, we will need all prescriptions for the patient to be under Asher's name    If you have any questions, please let us know. Thanks    Lauryn Barrera CPhT  [id number]) [address] [id number]  Phone: [phone]  Fax: [phone]

## 2018-04-02 DIAGNOSIS — F43.10 PTSD (POST-TRAUMATIC STRESS DISORDER): ICD-10-CM

## 2018-04-02 DIAGNOSIS — F25.0 SCHIZOAFFECTIVE DISORDER, BIPOLAR TYPE (H): Primary | ICD-10-CM

## 2018-04-02 RX ORDER — ARIPIPRAZOLE 15 MG/1
15 TABLET ORAL DAILY
Qty: 30 TABLET | Refills: 1 | OUTPATIENT
Start: 2018-04-02

## 2018-04-02 RX ORDER — GABAPENTIN 100 MG/1
200 CAPSULE ORAL 3 TIMES DAILY
Qty: 180 CAPSULE | Refills: 1 | OUTPATIENT
Start: 2018-04-02

## 2018-04-02 RX ORDER — LITHIUM CARBONATE 450 MG
900 TABLET, EXTENDED RELEASE ORAL AT BEDTIME
Qty: 60 TABLET | Refills: 1 | OUTPATIENT
Start: 2018-04-02

## 2018-04-02 RX ORDER — PRAZOSIN HYDROCHLORIDE 2 MG/1
2 CAPSULE ORAL AT BEDTIME
Qty: 30 CAPSULE | Refills: 1 | OUTPATIENT
Start: 2018-04-02

## 2018-04-02 RX ORDER — ESZOPICLONE 3 MG/1
3 TABLET, FILM COATED ORAL AT BEDTIME
Qty: 30 TABLET | Refills: 1 | OUTPATIENT
Start: 2018-04-02

## 2018-04-02 RX ORDER — MIRTAZAPINE 7.5 MG/1
7.5 TABLET, FILM COATED ORAL AT BEDTIME
Qty: 30 TABLET | Refills: 1 | OUTPATIENT
Start: 2018-04-02

## 2018-04-04 ENCOUNTER — PATIENT OUTREACH (OUTPATIENT)
Dept: CARE COORDINATION | Facility: CLINIC | Age: 28
End: 2018-04-04

## 2018-04-04 DIAGNOSIS — F31.11 BIPOLAR 1 DISORDER, MANIC, MILD (H): Primary | ICD-10-CM

## 2018-04-04 DIAGNOSIS — F19.10 POLYSUBSTANCE ABUSE (H): ICD-10-CM

## 2018-04-04 DIAGNOSIS — F25.0 SCHIZOAFFECTIVE DISORDER, BIPOLAR TYPE (H): ICD-10-CM

## 2018-04-04 DIAGNOSIS — F43.10 PTSD (POST-TRAUMATIC STRESS DISORDER): ICD-10-CM

## 2018-04-04 DIAGNOSIS — F41.9 ANXIETY: ICD-10-CM

## 2018-04-04 NOTE — LETTER
[facility]  [address] [place] [address]  [phone]      April 4, 2018    Ayana Prasad  [address] [facility] [address]      Dear Ayana,    I am a clinic care coordinator- who works with BROOKLYN Cruz CNP at the [facility] and I want to thank you for speaking with me today!  Like I shared, I am also sending you my contact information so that you can call me with questions or concerns about your health care. Below is a description of clinic care coordination and how I can further assist you.     The clinic care coordinator is a registered nurse and/or  who understand the health care system. The goal of clinic care coordination is to help you manage your health and improve access to the [facility] system in the most efficient manner. The registered nurse can assist you in meeting your health care goals by providing education, coordinating services, and strengthening the communication among your providers. The  can assist you with financial, behavioral, psychosocial, chemical dependency, counseling, and/or psychiatric resources.    Please feel free to contact me at [phone], with any questions or concerns. We at [facility] are focused on providing you with the highest-quality healthcare experience possible and that all starts with you.     Sincerely,     Nancy Anthony    Enclosed: I have enclosed a copy of a 24 Hour Access Plan. This has helpful phone numbers for you to call when needed. Please keep this in an easy to access place to use as needed.

## 2018-04-04 NOTE — PROGRESS NOTES
Clinic Care Coordination Contact    OUTREACH    Referral Information:  Referral Source: PCP  Primary Diagnosis: Dual -  MH/CD, pt is requesting assistance from her PCP and care team in establishing cares with a long term Psychiatry provider.      AUGIE placed call to pt.  Introduced self, title and role.  Pt agreed to speak with this writer.  Pt shared that her current Therapist believes she is unstable, but the pt states she is safe and is not suicidal, but wants assistance with the above mentioned.  Pt is aware that if she becomes unsafe that she can present to the ED or call 911 for assistance.    Chief Complaint   Patient presents with     Clinic Care Coordination - Initial     social work     Universal Utilization:   Utilization    Last refreshed: 4/4/2018  5:19 PM:  No Show Count (past year) 2       Last refreshed: 4/4/2018  5:19 PM:  ED visits 4       Last refreshed: 4/4/2018  5:19 PM:  Hospital admissions 0          Current as of: 4/4/2018  5:19 PM         Clinical Concerns:  Current Medical Concerns:  Pt states she has no new concerns medically.      Current Behavioral Concerns: Pt states she has no new concerns, however, then shared would like to get established with a Psych provider ASAP due to wanting to change her Psych meds soon.  Pt also shared that she has been snorting cocaine recently.  Has not used for a few days.  Says she goes to meetings and when asked how she is staying sober, pt shared she knows she is not supposed to be using & that it is bad for her, so she tries very hard not to use.  Pt interesting in getting a Rule 25 for CD treatment options.      Education Provided to patient: AUGIE explained that [facility] will be contacting the pt in 1-2 business days to make an appointment with any [place] partner who has opening in the [facility] area, or that per Lesa Matthew's last encounter note, dated 3-19-18, pt could meet with a MTM to discuss alternative medications.  Pt decided to wait to set an  appointment with [facility] and will try to get in with a Psych provider as a first choice.  Pt knows she can call this writer tomorrow if she changes her mind and this writer can get a new MTM order.    Clinical Pathway Name: None  Health Maintenance Reviewed: Due/Overdue (Pt will address with PCP--pap screening & tobacco sessation)    Medication Management:  Pt wants to discuss medication changes as she is having adverse side affects to her lithium.    Functional Status:  Mobility Status: Independent  Equipment Currently Used at Home: none  Transportation:  Medical transport, Family, Friend ([employer] at [phone])     Psychosocial:  Current living arrangement:: I live in a private home with spouse  Type of residence:: Private home - stairs  Financial/Insurance: SSDI/ [employer] [place]     Resources and Interventions:  Current Resources: OP Mental Health at [facility] at [phone]  Advanced Care Plans/Directives on file:: No  Referrals Placed: Behavioral Health Providers, CD, Rule 25 at [facility] at [phone]    Goals        General    Mental Health Management (pt-stated)     Notes - Note created  4/4/2018  5:05 PM by Nancy Anthony LSW    Goal Statement: I would like to get established with a long term Psychiatry provider   Measure of Success: Pt will establish cares and keep appointments with a Psych provider  Supportive Steps to Achieve: [facility] and [facility] will assist pt with obtaining appointment availability for Psych providers in her area.  Barriers: Pt has hx of not following with care providers for long term care needs  Strengths: Pt appears very motivated to work on her behavioral health issues at this time.  Date to Achieve By: 6 months          Patient/Caregiver understanding: Pt is aware that [facility] will be calling her in 1-2 days to schedule a Psych provider appointment.  Pt also is aware that she can call [facility] to schedule a Rule 25 for her chemical health.       Outreach Frequency:  1-2 days, PRN, monthly      Plan: Pt is agreeable to working with CC for establishing care plan and helping her get established with Psych and CD team members.    Nancy Jaramillo  Social Work Care Coordinator  [facility]  [phone]

## 2018-04-04 NOTE — LETTER
Health Care Home - Access Care Plan    About Me:  Patient Name:  Ayana Prasad    YOB: 1990  Age:  27 year old   Sylvia MRN:  [id number] Telephone Information:     Home Phone [phone]   Mobile [phone]   Home Phone [phone]       Address[address]    [address] [facility] [address] Email address:  [email or web address]      Emergency Contact(s)  Name Relationship Lgl Grd Work Phone Home Phone Mobile Phone   1. NANCY MOORE Spouse   [phone]              My Access Plan  Medical Emergency 911   Questions or concerns during clinic hours Primary Clinic Line, I will call the clinic directly: [facility] - [phone]   24 Hour Appointment Line [phone] or  [phone]) (toll free)   24 Hour Nurse Line [phone] (toll free)   Questions or concerns outside clinic hours 24 Hour Appointment Line, I will call the after-hours on-call line:   [facility] [phone] or [phone]) (toll-free)   Preferred Urgent Care [facility] [phone]   Preferred Hospital [facility]  [phone]   Preferred Pharmacy [facility] [address]     Behavioral Health Crisis Line The National Suicide Prevention Lifeline at 1-932.251.2171 or 911         My Care Team Members  Patient Care Team       Relationship Specialty Notifications Start End    Becki Avery, BROOKLYN CNP PCP - General Nurse Practitioner - Family  11/29/17     Phone: [phone] Fax: [phone]         [facility] [address] [address]    Nancy Anthony LSW Lead Care Coordinator Primary Care - CC Admissions 4/4/18     Phone: [phone] Fax: [phone]              My Medical and Care Information  Problem List   Patient Active Problem List   Diagnosis     Urolithiasis     ADHD (attention deficit hyperactivity disorder)      CARDIOVASCULAR SCREENING; LDL GOAL LESS THAN 160     Bipolar 1 disorder, manic, mild     Marijuana abuse     Polysubstance abuse     GERD (gastroesophageal reflux disease)     Tobacco abuse     Health Care Home     Abdominal pain, right upper quadrant     Intractable back pain     Insomnia     Optic neuritis     Cauda equina syndrome with neurogenic bladder (H)     MENTAL HEALTH     Schizoaffective disorder, bipolar type (H)     PTSD (post-traumatic stress disorder)     Anxiety     Auditory hallucinations     Class 2 obesity due to excess calories in adult      Current Medications and Allergies:  See printed Medication Report

## 2018-04-06 ENCOUNTER — PATIENT OUTREACH (OUTPATIENT)
Dept: CARE COORDINATION | Facility: CLINIC | Age: 28
End: 2018-04-06

## 2018-04-06 DIAGNOSIS — F43.10 PTSD (POST-TRAUMATIC STRESS DISORDER): ICD-10-CM

## 2018-04-06 DIAGNOSIS — F31.11 BIPOLAR 1 DISORDER, MANIC, MILD (H): ICD-10-CM

## 2018-04-06 DIAGNOSIS — F19.10 POLYSUBSTANCE ABUSE (H): ICD-10-CM

## 2018-04-06 DIAGNOSIS — F90.0 ATTENTION DEFICIT HYPERACTIVITY DISORDER (ADHD), PREDOMINANTLY INATTENTIVE TYPE: Primary | ICD-10-CM

## 2018-04-06 DIAGNOSIS — F25.0 SCHIZOAFFECTIVE DISORDER, BIPOLAR TYPE (H): ICD-10-CM

## 2018-04-06 DIAGNOSIS — F12.10 MARIJUANA ABUSE: ICD-10-CM

## 2018-04-06 NOTE — PROGRESS NOTES
Addendum:  Pt returned my call.  Pt shared that she has a Therapist, Sheila Adams at [facility] and that she would rather call and get names of Psych Providers from Sheila and call writer with them so this writer can then work with [employer] [place] to add to the provider to her insurance.  Pt shared she does not have to start the whole process over again with a new agency.  SW called and released the appt at [facility].    Pt and SW discussed pt's safety.  Pt shared she was safe for the weekend.  Her partner was working and pt is chosing to be alone as she is in a manic phase where she paces a lot and that disturbs her friends/family.      Pt said she has NOT stopped taking any medications since her discharge from the hospital.    Pt is hearing voices, but they are not disturbing to her and she is able to calm herself by pacing/walking.    Pt has not used for several days.  States she will not use this weekend.    Pt shared she is NOT a danger to herself or others at this time.  Pt also shared that IF this changes, she does know to call 911 or present to the hospital ED, as she has done before voluntarily.    SW to continue to work with the pt for Psych services/cares.    Clinic Care Coordination Contact  Care Team Conversations    SW was asked by pt's PCP to assist with getting the pt established with Psych cares.  SW assisted pt on 4-4-18 and was to follow up today.  AUGIE reviewed pt's chart again and discovered that pt is a Restricted Recipient Program, therefore, scheduling Psych services has been problematic for anyone who has been trying to schedule cares for this pt.    AUGIE asked the PCP to place a [facility] referral. AUGIE spoke with Pharmacist, Brian, and learned that pt has 3 weeks of medications prior to running out, as pt's medications were prescribed when pt was hospitalized in an inpatient program.  These providers will not renew the prescription for the pt.  The pt previously shared with this writer and PCP  that she wants to change a medication, however, clinic based Psych provider, DENNISE Matthew, did not feel comfortable doing this so soon after hospital discharge, so pt may not return to see her.    AUGIE called [facility], learned that [facility] in [place] has an appointment for next week.  AUGIE called and spoke with intake staff.  During this conversation, learned that [facility] has an office in [place], [place] and was transferred to that location as it is much closer to the pt's home & will be convenient for long term care needs.  Spoke with intake staff who shared that [facility] policy is to have a new patient see a Therapist initially & then they will be able to see a Psych provider.  No medication prescriptions/management until they see a therapist initially.  The whole intake process can take 7-8 weeks before the pt is able to see someone who can prescribe medications.      Per [facility] policy, the pt must be added to pt's Restricted Recipient Program BEFORE she can be scheduled to see the Psych provider, but she can see the Therapist anytime.  AUGIE to educate PCP on how to do this via the www.[email or web address] or via [email or web address] Application phone number.    AUGIE called the pt to discuss above, left a message stating we needed to chat as soon as possible and asked her to return my call.  Once the pt agrees to this plan of care, AUGIE will schedule pt's Therapy appointment with [facility].  (appt being held open for 4-16-18 at 11:00am with Devante Garcia at [address] [place] [address], [address] [place], [place]  [phone])    Pt can then schedule with Viji Murphy PA-C, Psych for long term Psych services.    SW may need to see Lesa Matthew PA-C at [facility] in the interim or have PCP continue to fill medication for 1 more month until Psych provider is in place.  AUGIE will leave this decision to PCP.    AUGIE to continue to assist pt with mental health appointments & cares.    Nancy Jaramillo  Social Work Care Coordinator  [facility]  [phone]

## 2018-04-10 ENCOUNTER — PATIENT OUTREACH (OUTPATIENT)
Dept: CARE COORDINATION | Facility: CLINIC | Age: 28
End: 2018-04-10

## 2018-04-10 NOTE — PROGRESS NOTES
Clinic Care Coordination Contact  Care Team Conversations    SW received phone call from pt this morning asking this writer to call and speak with her Therapist, Sheila Adams.    SW placed call to Sheila, left a message and requested a return call later this day.    SW received return call from Sheila of Therapy [facility], [phone].  Per Sheila, she has been seeing the pt for Therapy Services for more than 2 years and has a positive therapeutic relationship with the pt.    AUGIE explained to Sheila that last Friday this writer called  [facility] and Lesa Matthew is the only Psych provider at this time, but that the pt prefers not to return to her.  AUGIE also shared that per discussion with [facility] resources given last Friday, pt would need to establish cares with a new therapist and then could see a Psych provider within 6-8 weeks.  Sheila shared that her office often works with 2 Psychiatry provider's and provided their names with this writer.     Dr. Claudia Elliott  [facility] [phone].  SW tried calling, but phone was busy.    Dr. Sergio Salas  [facility].   [address] [place] [address] B  [place], [place]  [address] [phone]  Monday, April 16, 2018 at 9:40 PM     SW called and made an appointment with Dr. Salas.  SW then placed al call and spoke with the pt.  Pt shared that she is grateful for this appointment.  However, she is struggling today.  Pt shared that she used cocaine this weekend and that she and her wife are in the middle of a discussion on whether or not the pt should attend inpatient CD treatment.    SW and pt discussed getting a Rule 25 assessment completed soon, so that pt can learn what her CD treatment options are.  Pt and SW discussed that [facility] as [facility] is where she prefers to get her chemical health assessment.    SW called and left a message for Sheila and informed of the Psych  appt.    AUGIE called [facility] Restricted Recipient Program at [phone] and left a message requesting to add Dr. Salas to pt's care teams.    AUGIE also faxed the Restricted Recipient Program Referral form to [facility] which also requests to add Dr. Salas to pt's care team and states that she can prescribe medications, beginning 4-16-18 for 99 visits.  Confirmed via RightFax that this was sent at 3:41 PM.      Routed to PCP and care team for review.    Nancy Jaramillo  Social Work Care Coordinator  [facility]  [phone]

## 2018-04-12 NOTE — PROGRESS NOTES
Clinic Care Coordination Contact  Care Team Conversations    SW received phone call from pt's [facility] worker, Senia, who informed me that she got my name & phone number from the pt yesterday when she was working with the pt.    AUGIE asked if there was a VAL signed by the pt for us to be speaking and Senia shared that she will have her parent company, [employer], fax it to my office today.    AUGIE and Senia will speak tomorrow, once this writer has the VAL in hand.    Nancy Jaramillo  Social Work Care Coordinator  [facility]  [phone]

## 2018-04-13 NOTE — PROGRESS NOTES
Clinic Care Coordination Contact  Care Team Conversations    SW placed call to Senia, pt's [facility] worker via [employer], [phone].  Senia wanted to introduce self, title and role and let this writer know that she is also working with the pt at this time.    AUGIE and Senia discussed main areas of goal setting:  Chemical health, mental health and income.    AUGIE then placed call to pt to remind her of her upcoming appointment with:  Dr. Sergio Salas  [facility] [address] [place], [place] [address] [phone]  Monday, April 16, 2018 at 9:40 PM     Pt stated she plans on attending the appointment and thanked this writer for calling.    SW to continue to follow.    Nancy Jaramillo  Social Work Care Coordinator  [facility]  [phone]

## 2018-04-16 ENCOUNTER — MEDICAL CORRESPONDENCE (OUTPATIENT)
Dept: HEALTH INFORMATION MANAGEMENT | Facility: CLINIC | Age: 28
End: 2018-04-16

## 2018-07-19 ENCOUNTER — OFFICE VISIT (OUTPATIENT)
Dept: FAMILY MEDICINE | Facility: CLINIC | Age: 28
End: 2018-07-19
Payer: COMMERCIAL

## 2018-07-19 VITALS
HEART RATE: 76 BPM | TEMPERATURE: 98.3 F | BODY MASS INDEX: 33.55 KG/M2 | SYSTOLIC BLOOD PRESSURE: 100 MMHG | RESPIRATION RATE: 20 BRPM | DIASTOLIC BLOOD PRESSURE: 70 MMHG | WEIGHT: 211 LBS

## 2018-07-19 DIAGNOSIS — K13.70 ORAL LESION: Primary | ICD-10-CM

## 2018-07-19 PROCEDURE — 99213 OFFICE O/P EST LOW 20 MIN: CPT | Performed by: NURSE PRACTITIONER

## 2018-07-19 PROCEDURE — 87529 HSV DNA AMP PROBE: CPT | Performed by: NURSE PRACTITIONER

## 2018-07-19 ASSESSMENT — ENCOUNTER SYMPTOMS
CONSTIPATION: 0
DIAPHORESIS: 0
FATIGUE: 0
SHORTNESS OF BREATH: 0
EYE DISCHARGE: 0
RHINORRHEA: 0
DIZZINESS: 0
SINUS PRESSURE: 0
COUGH: 0
EYE ITCHING: 0
DIARRHEA: 0
LIGHT-HEADEDNESS: 0
FEVER: 0
HEADACHES: 0
NAUSEA: 0
SORE THROAT: 0
CHILLS: 0
WHEEZING: 0

## 2018-07-19 ASSESSMENT — PAIN SCALES - GENERAL: PAINLEVEL: NO PAIN (0)

## 2018-07-19 NOTE — PROGRESS NOTES
SUBJECTIVE:   Ayana Prasad is a 27 year old female who presents to clinic today for the following health issues:    Declines scheduling pap.    Chief Complaint   Patient presents with     Mouth Lesions     Sores around mouth and on nose for 4 days with yellow crust and itching.    Problem list and histories reviewed & adjusted, as indicated.  Additional history: as documented    Current Outpatient Prescriptions   Medication Sig Dispense Refill     ARIPiprazole (ABILIFY) 15 MG tablet Take 1 tablet (15 mg) by mouth daily (Patient not taking: Reported on 7/19/2018) 30 tablet 1     eszopiclone (LUNESTA) 3 MG tablet Take 1 tablet (3 mg) by mouth At Bedtime (Patient not taking: Reported on 7/19/2018) 30 tablet 1     gabapentin (NEURONTIN) 100 MG capsule Take 2 capsules (200 mg) by mouth 3 times daily (Patient not taking: Reported on 7/19/2018) 180 capsule 1     lithium (ESKALITH) 450 MG CR tablet Take 2 tablets (900 mg) by mouth At Bedtime (Patient not taking: Reported on 7/19/2018) 60 tablet 1     mirtazapine (REMERON) 7.5 MG TABS tablet Take 1 tablet (7.5 mg) by mouth At Bedtime (Patient not taking: Reported on 7/19/2018) 30 tablet 1     prazosin (MINIPRESS) 2 MG capsule Take 1 capsule (2 mg) by mouth At Bedtime (Patient not taking: Reported on 7/19/2018) 30 capsule 1     tretinoin (RETIN-A) 0.05 % cream Apply topically At Bedtime       Allergies   Allergen Reactions     Adhesive Tape Hives     Prednisone Other (See Comments) and Hives     Suicidal ideation     Droperidol Anxiety       Reviewed and updated as needed this visit by clinical staff  Tobacco  Allergies  Meds  Med Hx  Surg Hx  Fam Hx  Soc Hx      Reviewed and updated as needed this visit by Provider        Has had sores to mouth for the last 4 days. Using abreva at home, is not helping. Have not drained. No sores to inside of mouth. No pain with chewing or drinking. They itch. No new sexual partners. Has not had oral intercourse recently.  Reports  was recently [place] [employer] over the weekend and was in the sun for 14 hours did not wear any sunscreen.  Skin is still a bit itchy and feels some burned but is much better than it was.  Is peeling.    ROS:  Review of Systems   Constitutional: Negative for chills, diaphoresis, fatigue and fever.   HENT: Negative for ear discharge, ear pain, hearing loss, rhinorrhea, sinus pressure and sore throat.    Eyes: Negative for discharge and itching.   Respiratory: Negative for cough, shortness of breath and wheezing.    Gastrointestinal: Negative for constipation, diarrhea and nausea.   Skin: Negative for rash.        Sores to lips     Neurological: Negative for dizziness, light-headedness and headaches.         OBJECTIVE:     /70 (BP Location: Right arm, Patient Position: Sitting, Cuff Size: Adult Large)  Pulse 76  Temp 98.3  F (36.8  C) (Tympanic)  Resp 20  Wt 211 lb (95.7 kg)  BMI 33.55 kg/m2  Body mass index is 33.55 kg/(m^2).  Physical Exam   Constitutional: She appears well-developed.   HENT:   Right Ear: Tympanic membrane and external ear normal.   Left Ear: Tympanic membrane and external ear normal.   Nose: No mucosal edema or rhinorrhea.   Mouth/Throat:       Cardiovascular: Normal rate, regular rhythm and normal heart sounds.    Pulmonary/Chest: Effort normal and breath sounds normal.   Abdominal: Soft. Bowel sounds are normal.   Neurological: She is alert.   Skin: Skin is warm and dry.   Psychiatric: She has a normal mood and affect.       ASSESSMENT/PLAN:   1. Oral lesion  Viral culture obtained.  Full plan will depend on outcome of swab.  - HSV 1 and 2 DNA by PCR        BROOKLYN Cruz [facility]

## 2018-07-19 NOTE — MR AVS SNAPSHOT
After Visit Summary   7/19/2018    Ayana Prasad    MRN: [id number]           Patient Information     Date Of Birth          1990        Visit Information        Provider Department      7/19/2018 11:00 AM Becki Avery APRN [facility]         Follow-ups after your visit        Who to contact     Normal or non-critical lab and imaging results will be communicated to you by Embracehart, letter or phone within 4 business days after the clinic has received the results. If you do not hear from us within 7 days, please contact the clinic through Embracehart or phone. If you have a critical or abnormal lab result, we will notify you by phone as soon as possible.  Submit refill requests through Food on the Table or call your pharmacy and they will forward the refill request to us. Please allow 3 business days for your refill to be completed.          If you need to speak with a  for additional information , please call: [phone]           Additional Information About Your Visit        EmbraceharPollsb Information     Food on the Table gives you secure access to your electronic health record. If you see a primary care provider, you can also send messages to your care team and make appointments. If you have questions, please call your primary care clinic.  If you do not have a primary care provider, please call [phone] and they will assist you.        Care EveryWhere ID     This is your Care EveryWhere ID. This could be used by other organizations to access your [facility] medical records  [id number]        Your Vitals Were     Pulse Temperature Respirations BMI (Body Mass Index)          76 98.3  F (36.8  C) (Tympanic) 20 33.55 kg/m2         Blood Pressure from Last 3 Encounters:   07/19/18 100/70   03/19/18 (!) 139/91   02/27/18 121/82    Weight from Last 3 Encounters:   07/19/18 211 lb (95.7 kg)   03/19/18 221 lb (100.2 kg)   02/27/18 214 lb 11.2 oz (97.4 kg)              Today, you  had the following     No orders found for display       Primary Care Provider Office Phone # Fax #    BROOKLYN Ford [phone]       [facility] [address]   [place] [address]        Goals        General    Mental Health Management (pt-stated)     Notes - Note edited  4/10/2018  1:32 PM by Nancy Anthony LSW    Goal Statement: I would like to get established with a long term Psychiatry provider.   Measure of Success: Pt will establish cares and keep appointments with a Psych provider  Supportive Steps to Achieve: [facility] and [facility] will assist pt with obtaining appointment availability for Psych providers in her area.  Barriers: Pt has hx of not following with care providers for long term care needs  Strengths: Pt appears very motivated to work on her behavioral health issues at this time.  Date to Achieve By: 6 months          Equal Access to Services     PIPPA KOCH : Hadii dale Price, gogo augustine, tran beaulieu, fozia roca . So [facility] [phone].    ATENCIÓN: Si habla español, tiene a xiong disposición servicios gratuitos de asistencia lingüística. Llame al [phone].    We comply with applicable federal civil rights laws and [place] laws. We do not discriminate on the basis of race, color, national origin, age, disability, sex, sexual orientation, or gender identity.            Thank you!     Thank you for choosing [facility]  for your care. Our goal is always to provide you with excellent care. Hearing back from our patients is one way we can continue to improve our services. Please take a few minutes to complete the written survey that you may receive in the mail after your visit with us. Thank you!             Your Updated Medication List - Protect others around you: Learn how to safely use, store and throw away your medicines at www.disposemymeds.org.          This list is  accurate as of 7/19/18 11:39 AM.  Always use your most recent med list.                   Brand Name Dispense Instructions for use Diagnosis    ARIPiprazole 15 MG tablet    ABILIFY    30 tablet    Take 1 tablet (15 mg) by mouth daily    Schizoaffective disorder, bipolar type (H)       eszopiclone 3 MG tablet    LUNESTA    30 tablet    Take 1 tablet (3 mg) by mouth At Bedtime    Schizoaffective disorder, bipolar type (H)       gabapentin 100 MG capsule    NEURONTIN    180 capsule    Take 2 capsules (200 mg) by mouth 3 times daily    Schizoaffective disorder, bipolar type (H)       lithium 450 MG CR tablet    ESKALITH    60 tablet    Take 2 tablets (900 mg) by mouth At Bedtime    Schizoaffective disorder, bipolar type (H)       mirtazapine 7.5 MG Tabs tablet    REMERON    30 tablet    Take 1 tablet (7.5 mg) by mouth At Bedtime    Schizoaffective disorder, bipolar type (H)       prazosin 2 MG capsule    MINIPRESS    30 capsule    Take 1 capsule (2 mg) by mouth At Bedtime    Schizoaffective disorder, bipolar type (H)       tretinoin 0.05 % cream    RETIN-A     Apply topically At Bedtime

## 2018-07-20 ENCOUNTER — TELEPHONE (OUTPATIENT)
Dept: FAMILY MEDICINE | Facility: CLINIC | Age: 28
End: 2018-07-20

## 2018-07-20 DIAGNOSIS — B00.9 HSV (HERPES SIMPLEX VIRUS) INFECTION: Primary | ICD-10-CM

## 2018-07-20 LAB
HSV1 DNA SPEC QL NAA+PROBE: POSITIVE
HSV2 DNA SPEC QL NAA+PROBE: NEGATIVE
SPECIMEN SOURCE: ABNORMAL

## 2018-07-20 RX ORDER — ACYCLOVIR 200 MG/1
200 CAPSULE ORAL
Qty: 35 CAPSULE | Refills: 0 | Status: SHIPPED | OUTPATIENT
Start: 2018-07-20 | End: 2018-08-28

## 2018-07-20 NOTE — TELEPHONE ENCOUNTER
Can you please try and contact Ayana?    Lesions to lips are due to herpes virus.  Needs antiviral.  I attempted to call Ayana.  First number listed voicemail has not been set up, second number listed has been disconnected.  I know she is currently at the [facility].  Needs a prescription for acyclovir 200 mg by mouth 5 times per day for the next 7 days.  I am not sure which pharmacy she is going to want this sent to.  Is contagious.  Should try not to touch area if does needs to wash hands.  Can pass this on even when lesions are not present. You will always have this virus in your system. You are more subseptale to a breakout if you are under stress, not getting enough rest or have a decreased immune system for a variety of other reasons.     Becki Avery NP-C

## 2018-07-20 NOTE — TELEPHONE ENCOUNTER
Contacted patient, she is in [place], asked that we send script to [facility] in [place], but that she is restricted to the [facility].  She isn't sure why.  I sent the script to [facility], she will call them and see if they are able to transfer the script or how much out of pocket cost may be.    I told her medication and answered questions regarding comfort measures and triggers to avoid future outbreaks.  She asked [place], I advised if lesions were open, possible risk of infection but otherwise OK.    Ayana Jaimes RN

## 2018-08-28 ENCOUNTER — HOSPITAL ENCOUNTER (EMERGENCY)
Facility: CLINIC | Age: 28
Discharge: OTHER INSTITUTION | End: 2018-08-28
Attending: EMERGENCY MEDICINE | Admitting: EMERGENCY MEDICINE
Payer: COMMERCIAL

## 2018-08-28 ENCOUNTER — APPOINTMENT (OUTPATIENT)
Dept: GENERAL RADIOLOGY | Facility: CLINIC | Age: 28
End: 2018-08-28
Attending: PHYSICIAN ASSISTANT
Payer: COMMERCIAL

## 2018-08-28 ENCOUNTER — APPOINTMENT (OUTPATIENT)
Dept: CT IMAGING | Facility: CLINIC | Age: 28
End: 2018-08-28
Attending: EMERGENCY MEDICINE
Payer: COMMERCIAL

## 2018-08-28 ENCOUNTER — HOSPITAL ENCOUNTER (OUTPATIENT)
Facility: CLINIC | Age: 28
Setting detail: OBSERVATION
Discharge: HOME | End: 2018-08-30
Attending: INTERNAL MEDICINE | Admitting: INTERNAL MEDICINE
Payer: COMMERCIAL

## 2018-08-28 VITALS
SYSTOLIC BLOOD PRESSURE: 140 MMHG | WEIGHT: 210 LBS | TEMPERATURE: 98.2 F | DIASTOLIC BLOOD PRESSURE: 87 MMHG | OXYGEN SATURATION: 94 % | HEART RATE: 121 BPM | BODY MASS INDEX: 33.39 KG/M2 | RESPIRATION RATE: 16 BRPM

## 2018-08-28 DIAGNOSIS — N23 RENAL COLIC: ICD-10-CM

## 2018-08-28 DIAGNOSIS — N20.2 CALCULUS OF KIDNEY WITH CALCULUS OF URETER: Primary | ICD-10-CM

## 2018-08-28 DIAGNOSIS — R11.2 NON-INTRACTABLE VOMITING WITH NAUSEA, UNSPECIFIED VOMITING TYPE: ICD-10-CM

## 2018-08-28 DIAGNOSIS — N13.2 HYDRONEPHROSIS WITH URINARY OBSTRUCTION DUE TO URETERAL CALCULUS: ICD-10-CM

## 2018-08-28 DIAGNOSIS — N20.0 NEPHROLITHIASIS: ICD-10-CM

## 2018-08-28 LAB
ALBUMIN UR-MCNC: 30 MG/DL
ANION GAP SERPL CALCULATED.3IONS-SCNC: 11 MMOL/L (ref 3–14)
APPEARANCE UR: ABNORMAL
BASOPHILS # BLD AUTO: 0.1 10E9/L (ref 0–0.2)
BASOPHILS NFR BLD AUTO: 0.7 %
BILIRUB UR QL STRIP: NEGATIVE
BUN SERPL-MCNC: 13 MG/DL (ref 7–30)
CALCIUM SERPL-MCNC: 9.1 MG/DL (ref 8.5–10.1)
CHLORIDE SERPL-SCNC: 108 MMOL/L (ref 94–109)
CO2 SERPL-SCNC: 20 MMOL/L (ref 20–32)
COLOR UR AUTO: YELLOW
CREAT SERPL-MCNC: 0.82 MG/DL (ref 0.52–1.04)
DIFFERENTIAL METHOD BLD: ABNORMAL
EOSINOPHIL # BLD AUTO: 0.1 10E9/L (ref 0–0.7)
EOSINOPHIL NFR BLD AUTO: 1 %
ERYTHROCYTE [DISTWIDTH] IN BLOOD BY AUTOMATED COUNT: 13.3 % (ref 10–15)
GFR SERPL CREATININE-BSD FRML MDRD: 84 ML/MIN/1.7M2
GLUCOSE SERPL-MCNC: 105 MG/DL (ref 70–99)
GLUCOSE UR STRIP-MCNC: NEGATIVE MG/DL
HCG UR QL: NEGATIVE
HCT VFR BLD AUTO: 46.4 % (ref 35–47)
HGB BLD-MCNC: 15.5 G/DL (ref 11.7–15.7)
HGB UR QL STRIP: ABNORMAL
IMM GRANULOCYTES # BLD: 0 10E9/L (ref 0–0.4)
IMM GRANULOCYTES NFR BLD: 0.4 %
KETONES UR STRIP-MCNC: NEGATIVE MG/DL
LEUKOCYTE ESTERASE UR QL STRIP: ABNORMAL
LYMPHOCYTES # BLD AUTO: 2.8 10E9/L (ref 0.8–5.3)
LYMPHOCYTES NFR BLD AUTO: 27.6 %
MCH RBC QN AUTO: 28.1 PG (ref 26.5–33)
MCHC RBC AUTO-ENTMCNC: 33.4 G/DL (ref 31.5–36.5)
MCV RBC AUTO: 84 FL (ref 78–100)
MONOCYTES # BLD AUTO: 0.5 10E9/L (ref 0–1.3)
MONOCYTES NFR BLD AUTO: 5 %
MUCOUS THREADS #/AREA URNS LPF: PRESENT /LPF
NEUTROPHILS # BLD AUTO: 6.7 10E9/L (ref 1.6–8.3)
NEUTROPHILS NFR BLD AUTO: 65.3 %
NITRATE UR QL: NEGATIVE
NRBC # BLD AUTO: 0 10*3/UL
NRBC BLD AUTO-RTO: 0 /100
PH UR STRIP: 5 PH (ref 5–7)
PLATELET # BLD AUTO: 363 10E9/L (ref 150–450)
POTASSIUM SERPL-SCNC: 4.1 MMOL/L (ref 3.4–5.3)
RBC # BLD AUTO: 5.51 10E12/L (ref 3.8–5.2)
RBC #/AREA URNS AUTO: >182 /HPF (ref 0–2)
SODIUM SERPL-SCNC: 139 MMOL/L (ref 133–144)
SOURCE: ABNORMAL
SP GR UR STRIP: 1.02 (ref 1–1.03)
SQUAMOUS #/AREA URNS AUTO: 4 /HPF (ref 0–1)
UROBILINOGEN UR STRIP-MCNC: 0 MG/DL (ref 0–2)
WBC # BLD AUTO: 10.3 10E9/L (ref 4–11)
WBC #/AREA URNS AUTO: 1 /HPF (ref 0–5)

## 2018-08-28 PROCEDURE — 80048 BASIC METABOLIC PNL TOTAL CA: CPT | Performed by: EMERGENCY MEDICINE

## 2018-08-28 PROCEDURE — 99207 ZZC CDG-CODE CATEGORY CHANGED: CPT | Performed by: PHYSICIAN ASSISTANT

## 2018-08-28 PROCEDURE — 25000128 H RX IP 250 OP 636: Performed by: EMERGENCY MEDICINE

## 2018-08-28 PROCEDURE — 96376 TX/PRO/DX INJ SAME DRUG ADON: CPT

## 2018-08-28 PROCEDURE — 71045 X-RAY EXAM CHEST 1 VIEW: CPT

## 2018-08-28 PROCEDURE — 96361 HYDRATE IV INFUSION ADD-ON: CPT

## 2018-08-28 PROCEDURE — 81025 URINE PREGNANCY TEST: CPT | Performed by: EMERGENCY MEDICINE

## 2018-08-28 PROCEDURE — 81001 URINALYSIS AUTO W/SCOPE: CPT | Performed by: EMERGENCY MEDICINE

## 2018-08-28 PROCEDURE — 96374 THER/PROPH/DIAG INJ IV PUSH: CPT

## 2018-08-28 PROCEDURE — 25000132 ZZH RX MED GY IP 250 OP 250 PS 637: Performed by: INTERNAL MEDICINE

## 2018-08-28 PROCEDURE — 25000132 ZZH RX MED GY IP 250 OP 250 PS 637: Performed by: EMERGENCY MEDICINE

## 2018-08-28 PROCEDURE — 99220 ZZC INITIAL OBSERVATION CARE,LEVL III: CPT | Performed by: PHYSICIAN ASSISTANT

## 2018-08-28 PROCEDURE — 85025 COMPLETE CBC W/AUTO DIFF WBC: CPT | Performed by: EMERGENCY MEDICINE

## 2018-08-28 PROCEDURE — G0378 HOSPITAL OBSERVATION PER HR: HCPCS

## 2018-08-28 PROCEDURE — 25000128 H RX IP 250 OP 636: Performed by: PHYSICIAN ASSISTANT

## 2018-08-28 PROCEDURE — 25000132 ZZH RX MED GY IP 250 OP 250 PS 637: Performed by: PHYSICIAN ASSISTANT

## 2018-08-28 PROCEDURE — 99285 EMERGENCY DEPT VISIT HI MDM: CPT | Mod: 25

## 2018-08-28 PROCEDURE — 74176 CT ABD & PELVIS W/O CONTRAST: CPT

## 2018-08-28 PROCEDURE — 96375 TX/PRO/DX INJ NEW DRUG ADDON: CPT

## 2018-08-28 RX ORDER — ACETAMINOPHEN 650 MG/1
650 SUPPOSITORY RECTAL EVERY 4 HOURS PRN
Status: CANCELLED | OUTPATIENT
Start: 2018-08-28

## 2018-08-28 RX ORDER — LORAZEPAM 2 MG/ML
1 INJECTION INTRAMUSCULAR ONCE
Status: COMPLETED | OUTPATIENT
Start: 2018-08-28 | End: 2018-08-28

## 2018-08-28 RX ORDER — ONDANSETRON 4 MG/1
4 TABLET, ORALLY DISINTEGRATING ORAL EVERY 8 HOURS PRN
Qty: 10 TABLET | Refills: 0 | Status: SHIPPED | OUTPATIENT
Start: 2018-08-28 | End: 2018-08-28

## 2018-08-28 RX ORDER — NICOTINE 21 MG/24HR
1 PATCH, TRANSDERMAL 24 HOURS TRANSDERMAL DAILY
Status: DISCONTINUED | OUTPATIENT
Start: 2018-08-28 | End: 2018-08-30

## 2018-08-28 RX ORDER — DOCUSATE SODIUM 100 MG/1
100 CAPSULE, LIQUID FILLED ORAL 2 TIMES DAILY
Status: DISCONTINUED | OUTPATIENT
Start: 2018-08-28 | End: 2018-08-30 | Stop reason: HOSPADM

## 2018-08-28 RX ORDER — ACETAMINOPHEN 325 MG/1
650 TABLET ORAL EVERY 4 HOURS PRN
Status: DISCONTINUED | OUTPATIENT
Start: 2018-08-28 | End: 2018-08-28

## 2018-08-28 RX ORDER — PROCHLORPERAZINE 25 MG
25 SUPPOSITORY, RECTAL RECTAL EVERY 12 HOURS PRN
Status: DISCONTINUED | OUTPATIENT
Start: 2018-08-28 | End: 2018-08-30 | Stop reason: HOSPADM

## 2018-08-28 RX ORDER — PROCHLORPERAZINE MALEATE 10 MG
10 TABLET ORAL EVERY 6 HOURS PRN
Status: DISCONTINUED | OUTPATIENT
Start: 2018-08-28 | End: 2018-08-30 | Stop reason: HOSPADM

## 2018-08-28 RX ORDER — BISACODYL 5 MG
15 TABLET, DELAYED RELEASE (ENTERIC COATED) ORAL DAILY PRN
Status: DISCONTINUED | OUTPATIENT
Start: 2018-08-28 | End: 2018-08-30 | Stop reason: HOSPADM

## 2018-08-28 RX ORDER — ONDANSETRON 2 MG/ML
4 INJECTION INTRAMUSCULAR; INTRAVENOUS ONCE
Status: COMPLETED | OUTPATIENT
Start: 2018-08-28 | End: 2018-08-28

## 2018-08-28 RX ORDER — SODIUM CHLORIDE 9 MG/ML
1000 INJECTION, SOLUTION INTRAVENOUS CONTINUOUS
Status: DISCONTINUED | OUTPATIENT
Start: 2018-08-28 | End: 2018-08-28 | Stop reason: HOSPADM

## 2018-08-28 RX ORDER — NALOXONE HYDROCHLORIDE 0.4 MG/ML
.1-.4 INJECTION, SOLUTION INTRAMUSCULAR; INTRAVENOUS; SUBCUTANEOUS
Status: CANCELLED | OUTPATIENT
Start: 2018-08-28

## 2018-08-28 RX ORDER — BISACODYL 5 MG
5 TABLET, DELAYED RELEASE (ENTERIC COATED) ORAL DAILY PRN
Status: DISCONTINUED | OUTPATIENT
Start: 2018-08-28 | End: 2018-08-30 | Stop reason: HOSPADM

## 2018-08-28 RX ORDER — GABAPENTIN 100 MG/1
300 CAPSULE ORAL 3 TIMES DAILY
COMMUNITY
End: 2018-10-13

## 2018-08-28 RX ORDER — ACETAMINOPHEN 325 MG/1
325-650 TABLET ORAL EVERY 6 HOURS PRN
Status: DISCONTINUED | OUTPATIENT
Start: 2018-08-28 | End: 2018-08-30 | Stop reason: HOSPADM

## 2018-08-28 RX ORDER — ONDANSETRON 2 MG/ML
4 INJECTION INTRAMUSCULAR; INTRAVENOUS EVERY 6 HOURS PRN
Status: DISCONTINUED | OUTPATIENT
Start: 2018-08-28 | End: 2018-08-30 | Stop reason: HOSPADM

## 2018-08-28 RX ORDER — HYDROMORPHONE HYDROCHLORIDE 1 MG/ML
.3-.5 INJECTION, SOLUTION INTRAMUSCULAR; INTRAVENOUS; SUBCUTANEOUS
Status: DISCONTINUED | OUTPATIENT
Start: 2018-08-28 | End: 2018-08-30 | Stop reason: HOSPADM

## 2018-08-28 RX ORDER — ACETAMINOPHEN 325 MG/1
325-650 TABLET ORAL EVERY 6 HOURS PRN
COMMUNITY
End: 2018-10-13

## 2018-08-28 RX ORDER — LURASIDONE HYDROCHLORIDE 40 MG/1
40 TABLET, FILM COATED ORAL DAILY
Status: ON HOLD | COMMUNITY
End: 2018-08-29

## 2018-08-28 RX ORDER — NICOTINE 21 MG/24HR
1 PATCH, TRANSDERMAL 24 HOURS TRANSDERMAL DAILY
Status: DISCONTINUED | OUTPATIENT
Start: 2018-08-28 | End: 2018-08-28

## 2018-08-28 RX ORDER — GABAPENTIN 300 MG/1
300 CAPSULE ORAL 3 TIMES DAILY
Status: DISCONTINUED | OUTPATIENT
Start: 2018-08-28 | End: 2018-08-30 | Stop reason: HOSPADM

## 2018-08-28 RX ORDER — TAMSULOSIN HYDROCHLORIDE 0.4 MG/1
0.4 CAPSULE ORAL DAILY
Qty: 10 CAPSULE | Refills: 0 | Status: SHIPPED | OUTPATIENT
Start: 2018-08-28 | End: 2018-08-28

## 2018-08-28 RX ORDER — ONDANSETRON 4 MG/1
4 TABLET, ORALLY DISINTEGRATING ORAL EVERY 6 HOURS PRN
Status: CANCELLED | OUTPATIENT
Start: 2018-08-28

## 2018-08-28 RX ORDER — TAMSULOSIN HYDROCHLORIDE 0.4 MG/1
0.4 CAPSULE ORAL DAILY
Status: DISCONTINUED | OUTPATIENT
Start: 2018-08-29 | End: 2018-08-28

## 2018-08-28 RX ORDER — OXYCODONE AND ACETAMINOPHEN 5; 325 MG/1; MG/1
1-2 TABLET ORAL EVERY 4 HOURS PRN
Qty: 12 TABLET | Refills: 0 | Status: SHIPPED | OUTPATIENT
Start: 2018-08-28 | End: 2018-08-28

## 2018-08-28 RX ORDER — OXYCODONE HYDROCHLORIDE 5 MG/1
5-10 TABLET ORAL
Status: CANCELLED | OUTPATIENT
Start: 2018-08-28

## 2018-08-28 RX ORDER — HYDROMORPHONE HYDROCHLORIDE 1 MG/ML
.5-1 INJECTION, SOLUTION INTRAMUSCULAR; INTRAVENOUS; SUBCUTANEOUS
Status: CANCELLED | OUTPATIENT
Start: 2018-08-28

## 2018-08-28 RX ORDER — ONDANSETRON 2 MG/ML
4 INJECTION INTRAMUSCULAR; INTRAVENOUS EVERY 6 HOURS PRN
Status: CANCELLED | OUTPATIENT
Start: 2018-08-28

## 2018-08-28 RX ORDER — NICOTINE 21 MG/24HR
1 PATCH, TRANSDERMAL 24 HOURS TRANSDERMAL DAILY
Status: DISCONTINUED | OUTPATIENT
Start: 2018-08-29 | End: 2018-08-28

## 2018-08-28 RX ORDER — ONDANSETRON 2 MG/ML
4 INJECTION INTRAMUSCULAR; INTRAVENOUS
Status: COMPLETED | OUTPATIENT
Start: 2018-08-28 | End: 2018-08-28

## 2018-08-28 RX ORDER — TAMSULOSIN HYDROCHLORIDE 0.4 MG/1
0.4 CAPSULE ORAL ONCE
Status: COMPLETED | OUTPATIENT
Start: 2018-08-28 | End: 2018-08-28

## 2018-08-28 RX ORDER — HYDROCODONE BITARTRATE AND ACETAMINOPHEN 5; 325 MG/1; MG/1
1-2 TABLET ORAL EVERY 4 HOURS PRN
Status: DISCONTINUED | OUTPATIENT
Start: 2018-08-28 | End: 2018-08-30 | Stop reason: HOSPADM

## 2018-08-28 RX ORDER — ACETAMINOPHEN 650 MG/1
650 SUPPOSITORY RECTAL EVERY 4 HOURS PRN
Status: DISCONTINUED | OUTPATIENT
Start: 2018-08-28 | End: 2018-08-30 | Stop reason: HOSPADM

## 2018-08-28 RX ORDER — TAMSULOSIN HYDROCHLORIDE 0.4 MG/1
0.4 CAPSULE ORAL DAILY
Status: CANCELLED | OUTPATIENT
Start: 2018-08-29

## 2018-08-28 RX ORDER — AMOXICILLIN 250 MG
2 CAPSULE ORAL 2 TIMES DAILY
Status: CANCELLED | OUTPATIENT
Start: 2018-08-28

## 2018-08-28 RX ORDER — PROCHLORPERAZINE MALEATE 10 MG
10 TABLET ORAL EVERY 6 HOURS PRN
Status: CANCELLED | OUTPATIENT
Start: 2018-08-28

## 2018-08-28 RX ORDER — BISACODYL 5 MG
10 TABLET, DELAYED RELEASE (ENTERIC COATED) ORAL DAILY PRN
Status: DISCONTINUED | OUTPATIENT
Start: 2018-08-28 | End: 2018-08-30 | Stop reason: HOSPADM

## 2018-08-28 RX ORDER — SODIUM CHLORIDE, SODIUM LACTATE, POTASSIUM CHLORIDE, CALCIUM CHLORIDE 600; 310; 30; 20 MG/100ML; MG/100ML; MG/100ML; MG/100ML
INJECTION, SOLUTION INTRAVENOUS CONTINUOUS
Status: DISCONTINUED | OUTPATIENT
Start: 2018-08-28 | End: 2018-08-30 | Stop reason: HOSPADM

## 2018-08-28 RX ORDER — TAMSULOSIN HYDROCHLORIDE 0.4 MG/1
0.4 CAPSULE ORAL DAILY
Status: DISCONTINUED | OUTPATIENT
Start: 2018-08-28 | End: 2018-08-30 | Stop reason: HOSPADM

## 2018-08-28 RX ORDER — POLYETHYLENE GLYCOL 3350 17 G/17G
17 POWDER, FOR SOLUTION ORAL DAILY PRN
Status: DISCONTINUED | OUTPATIENT
Start: 2018-08-28 | End: 2018-08-30 | Stop reason: HOSPADM

## 2018-08-28 RX ORDER — NALOXONE HYDROCHLORIDE 0.4 MG/ML
.1-.4 INJECTION, SOLUTION INTRAMUSCULAR; INTRAVENOUS; SUBCUTANEOUS
Status: DISCONTINUED | OUTPATIENT
Start: 2018-08-28 | End: 2018-08-30 | Stop reason: HOSPADM

## 2018-08-28 RX ORDER — ACETAMINOPHEN 325 MG/1
650 TABLET ORAL EVERY 4 HOURS PRN
Status: CANCELLED | OUTPATIENT
Start: 2018-08-28

## 2018-08-28 RX ORDER — KETOROLAC TROMETHAMINE 15 MG/ML
15 INJECTION, SOLUTION INTRAMUSCULAR; INTRAVENOUS EVERY 6 HOURS
Status: CANCELLED | OUTPATIENT
Start: 2018-08-28 | End: 2018-08-29

## 2018-08-28 RX ORDER — AMOXICILLIN 250 MG
1 CAPSULE ORAL 2 TIMES DAILY
Status: CANCELLED | OUTPATIENT
Start: 2018-08-28

## 2018-08-28 RX ORDER — MAGNESIUM CARB/ALUMINUM HYDROX 105-160MG
148 TABLET,CHEWABLE ORAL
Status: DISCONTINUED | OUTPATIENT
Start: 2018-08-28 | End: 2018-08-30 | Stop reason: HOSPADM

## 2018-08-28 RX ORDER — DIPHENHYDRAMINE HYDROCHLORIDE 50 MG/ML
50 INJECTION INTRAMUSCULAR; INTRAVENOUS ONCE
Status: COMPLETED | OUTPATIENT
Start: 2018-08-28 | End: 2018-08-28

## 2018-08-28 RX ORDER — OXYCODONE AND ACETAMINOPHEN 5; 325 MG/1; MG/1
2 TABLET ORAL ONCE
Status: COMPLETED | OUTPATIENT
Start: 2018-08-28 | End: 2018-08-28

## 2018-08-28 RX ORDER — DIPHENHYDRAMINE HCL 25 MG
25 CAPSULE ORAL EVERY 6 HOURS PRN
Status: DISCONTINUED | OUTPATIENT
Start: 2018-08-28 | End: 2018-08-30 | Stop reason: HOSPADM

## 2018-08-28 RX ORDER — MORPHINE SULFATE 4 MG/ML
4 INJECTION, SOLUTION INTRAMUSCULAR; INTRAVENOUS
Status: COMPLETED | OUTPATIENT
Start: 2018-08-28 | End: 2018-08-28

## 2018-08-28 RX ORDER — PROCHLORPERAZINE 25 MG
25 SUPPOSITORY, RECTAL RECTAL EVERY 12 HOURS PRN
Status: CANCELLED | OUTPATIENT
Start: 2018-08-28

## 2018-08-28 RX ORDER — ONDANSETRON 4 MG/1
4 TABLET, ORALLY DISINTEGRATING ORAL EVERY 6 HOURS PRN
Status: DISCONTINUED | OUTPATIENT
Start: 2018-08-28 | End: 2018-08-30 | Stop reason: HOSPADM

## 2018-08-28 RX ADMIN — DIPHENHYDRAMINE HYDROCHLORIDE 25 MG: 25 CAPSULE ORAL at 21:35

## 2018-08-28 RX ADMIN — NICOTINE 1 PATCH: 21 PATCH, EXTENDED RELEASE TRANSDERMAL at 21:35

## 2018-08-28 RX ADMIN — GABAPENTIN 300 MG: 300 CAPSULE ORAL at 20:33

## 2018-08-28 RX ADMIN — HYDROCODONE BITARTRATE AND ACETAMINOPHEN 1 TABLET: 5; 325 TABLET ORAL at 21:35

## 2018-08-28 RX ADMIN — TAMSULOSIN HYDROCHLORIDE 0.4 MG: 0.4 CAPSULE ORAL at 15:25

## 2018-08-28 RX ADMIN — ONDANSETRON 4 MG: 2 INJECTION INTRAMUSCULAR; INTRAVENOUS at 12:44

## 2018-08-28 RX ADMIN — MORPHINE SULFATE 4 MG: 4 INJECTION INTRAVENOUS at 12:14

## 2018-08-28 RX ADMIN — HYDROMORPHONE HYDROCHLORIDE 1 MG: 1 INJECTION, SOLUTION INTRAMUSCULAR; INTRAVENOUS; SUBCUTANEOUS at 12:44

## 2018-08-28 RX ADMIN — OXYCODONE HYDROCHLORIDE AND ACETAMINOPHEN 2 TABLET: 5; 325 TABLET ORAL at 14:37

## 2018-08-28 RX ADMIN — SODIUM CHLORIDE 1000 ML: 9 INJECTION, SOLUTION INTRAVENOUS at 12:19

## 2018-08-28 RX ADMIN — SODIUM CHLORIDE 1000 ML: 9 INJECTION, SOLUTION INTRAVENOUS at 12:57

## 2018-08-28 RX ADMIN — MORPHINE SULFATE 4 MG: 4 INJECTION INTRAVENOUS at 12:30

## 2018-08-28 RX ADMIN — LORAZEPAM 1 MG: 2 INJECTION INTRAMUSCULAR; INTRAVENOUS at 13:42

## 2018-08-28 RX ADMIN — DIPHENHYDRAMINE HYDROCHLORIDE 50 MG: 50 INJECTION INTRAMUSCULAR; INTRAVENOUS at 13:10

## 2018-08-28 RX ADMIN — MORPHINE SULFATE 4 MG: 4 INJECTION INTRAVENOUS at 15:26

## 2018-08-28 RX ADMIN — HYDROMORPHONE HYDROCHLORIDE 1 MG: 1 INJECTION, SOLUTION INTRAMUSCULAR; INTRAVENOUS; SUBCUTANEOUS at 13:11

## 2018-08-28 RX ADMIN — ONDANSETRON 4 MG: 2 INJECTION INTRAMUSCULAR; INTRAVENOUS at 12:14

## 2018-08-28 RX ADMIN — Medication 0.5 MG: at 20:33

## 2018-08-28 ASSESSMENT — ENCOUNTER SYMPTOMS
SHORTNESS OF BREATH: 0
VOMITING: 1
NAUSEA: 1
FEVER: 0
HEMATURIA: 0
FLANK PAIN: 1
CHILLS: 0

## 2018-08-28 NOTE — Clinical Note
Admitting Physician: BAILEY DIAZ [[id number]]   Clinical Service: [facility] ORAL MAXILLOFACIAL SURG [facility] [[id number]]   Bed Type: Observation Unit [54]   Special needs: Fall Risk [8]   Bed request comments: [facility] To Observation - Bed Request Sent @ 9780

## 2018-08-28 NOTE — IP AVS SNAPSHOT
MRN:[id number]                      After Visit Summary   8/28/2018    Ayana Prasad    MRN: [id number]           Thank you!     Thank you for choosing [facility] for your care. Our goal is always to provide you with excellent care. Hearing back from our patients is one way we can continue to improve our services. Please take a few minutes to complete the written survey that you may receive in the mail after you visit with us. Thank you!        Patient Information     Date Of Birth          1990        About your hospital stay     You were admitted on:  August 28, 2018 You last received care in the:  [facility]    You were discharged on:  August 30, 2018       Who to Call     For medical emergencies, please call 911.  For non-urgent questions about your medical care, please call your primary care provider or clinic, [phone]  For questions related to your surgery, please call your surgery clinic        Attending Provider     Provider Specialty    Rafael Davis MD Internal Medicine       Primary Care Provider Office Phone # Fax #    Becki [place]N Argueta [phone]      After Care Instructions     Activity       Your activity upon discharge: activity as tolerated            Diet       Follow this diet upon discharge: None                  Follow-up Appointments     Follow-up and recommended labs and tests        F/u urology in 1 month                  Your next 10 appointments already scheduled     Oct 03, 2018  1:00 PM CDT   Return Visit with Kiran Ulrich MD   [facility]    [address] [address]   [phone]              Pending Results     Date and Time Order Name Status Description    8/29/2018 1958 EKG 12-LEAD, TRACING ONLY In process             Statement of Approval     Ordered          08/30/18 9939  I have reviewed and agree with all the recommendations and  orders detailed in this document.  EFFECTIVE NOW     Approved and electronically signed by:  Rafael Davis MD             Admission Information     Date & Time Provider Department Dept. Phone    8/28/2018 Rafael Davis MD [facility] [phone]      Your Vitals Were     Blood Pressure Pulse Temperature Respirations Pulse Oximetry       126/79 90 97.9  F (36.6  C) (Oral) 14 96%       MyChart Information     Neterohart gives you secure access to your electronic health record. If you see a primary care provider, you can also send messages to your care team and make appointments. If you have questions, please call your primary care clinic.  If you do not have a primary care provider, please call [phone] and they will assist you.        Care EveryWhere ID     This is your Care EveryWhere ID. This could be used by other organizations to access your [facility] medical records  [id number]        Equal Access to Services     EL[facility]O [facility] : Garfield Price, gogo augustine, tran beaulieu, fozia roca . So [facility] [phone].    ATENCIÓN: Si habla español, tiene a xiong disposición servicios gratuitos de asistencia lingüística. Llame al [phone].    We comply with applicable federal civil rights laws and [place] laws. We do not discriminate on the basis of race, color, national origin, age, disability, sex, sexual orientation, or gender identity.               Review of your medicines      START taking        Dose / Directions    HYDROcodone-acetaminophen 5-325 MG per tablet   Commonly known as:  NORCO        Dose:  1-2 tablet   Take 1-2 tablets by mouth every 4 hours as needed for other (pain control or improvement in physical function.)   Quantity:  20 tablet   Refills:  0       oxybutynin 5 MG tablet   Commonly known as:  DITROPAN        Dose:  5 mg   Take 1 tablet (5 mg) by mouth 2 times daily as needed for bladder spasms   Quantity:  360 tablet    Refills:  0       tamsulosin 0.4 MG capsule   Commonly known as:  FLOMAX        Dose:  0.4 mg   Start taking on:  8/31/2018   Take 1 capsule (0.4 mg) by mouth daily   Quantity:  60 capsule   Refills:  0         CONTINUE these medicines which have NOT CHANGED        Dose / Directions    gabapentin 100 MG capsule   Commonly known as:  NEURONTIN        Dose:  300 mg   Take 300 mg by mouth 3 times daily Dose per pt: 300mg tid. Per [facility]: last filled 200mg tid on March 30, 2018.   Refills:  0       tretinoin 0.05 % cream   Commonly known as:  RETIN-A        Apply topically At Bedtime   Refills:  0       TYLENOL 325 MG tablet   Generic drug:  acetaminophen        Dose:  325-650 mg   Take 325-650 mg by mouth every 6 hours as needed for fever   Refills:  0            Where to get your medicines      These medications were sent to [facility] [address]  [address] [address]     Phone:  [phone]     oxybutynin 5 MG tablet    tamsulosin 0.4 MG capsule         Some of these will need a paper prescription and others can be bought over the counter. Ask your nurse if you have questions.     Bring a paper prescription for each of these medications     HYDROcodone-acetaminophen 5-325 MG per tablet                Protect others around you: Learn how to safely use, store and throw away your medicines at www.disposemymeds.org.        Information about OPIOIDS     PRESCRIPTION OPIOIDS: WHAT YOU NEED TO KNOW   We gave you an opioid (narcotic) pain medicine. It is important to manage your pain, but opioids are not always the best choice. You should first try all the other options your care team gave you. Take this medicine for as short a time (and as few doses) as possible.    Some activities can increase your pain, such as bandage changes or therapy sessions. It may help to take your pain medicine 30 to 60 minutes before these activities. Reduce your  stress by getting enough sleep, working on hobbies you enjoy and practicing relaxation or meditation. Talk to your care team about ways to manage your pain beyond prescription opioids.    These medicines have risks:    DO NOT drive when on new or higher doses of pain medicine. These medicines can affect your alertness and reaction times, and you could be arrested for driving under the influence (DUI). If you need to use opioids long-term, talk to your care team about driving.    DO NOT operate heavy machinery    DO NOT do any other dangerous activities while taking these medicines.    DO NOT drink any alcohol while taking these medicines.     If the opioid prescribed includes acetaminophen, DO NOT take with any other medicines that contain acetaminophen. Read all labels carefully. Look for the word  acetaminophen  or  Tylenol.  Ask your pharmacist if you have questions or are unsure.    You can get addicted to pain medicines, especially if you have a history of addiction (chemical, alcohol or substance dependence). Talk to your care team about ways to reduce this risk.    All opioids tend to cause constipation. Drink plenty of water and eat foods that have a lot of fiber, such as fruits, vegetables, prune juice, apple juice and high-fiber cereal. Take a laxative (Miralax, milk of magnesia, Colace, Senna) if you don t move your bowels at least every other day. Other side effects include upset stomach, sleepiness, dizziness, throwing up, tolerance (needing more of the medicine to have the same effect), physical dependence and slowed breathing.    Store your pills in a secure place, locked if possible. We will not replace any lost or stolen medicine. If you don t finish your medicine, please throw away (dispose) as directed by your pharmacist. The [place] Pollution Control Agency has more information about safe disposal: https://www.pca.[facility].[place].us/living-green/managing-unwanted-medications             Medication  List: This is a list of all your medications and when to take them. Check marks below indicate your daily home schedule. Keep this list as a reference.      Medications           Morning Afternoon Evening Bedtime As Needed    gabapentin 100 MG capsule   Commonly known as:  NEURONTIN   Take 300 mg by mouth 3 times daily Dose per pt: 300mg tid. Per [facility]: last filled 200mg tid on March 30, 2018.   Last time this was given:  300 mg on 8/30/2018  2:22 PM                                   HYDROcodone-acetaminophen 5-325 MG per tablet   Commonly known as:  NORCO   Take 1-2 tablets by mouth every 4 hours as needed for other (pain control or improvement in physical function.)   Last time this was given:  2 tablets on 8/30/2018 12:59 PM                                   oxybutynin 5 MG tablet   Commonly known as:  DITROPAN   Take 1 tablet (5 mg) by mouth 2 times daily as needed for bladder spasms                                   tamsulosin 0.4 MG capsule   Commonly known as:  FLOMAX   Take 1 capsule (0.4 mg) by mouth daily   Start taking on:  8/31/2018   Last time this was given:  0.4 mg on 8/30/2018  8:03 AM                                   tretinoin 0.05 % cream   Commonly known as:  RETIN-A   Apply topically At Bedtime                                   TYLENOL 325 MG tablet   Take 325-650 mg by mouth every 6 hours as needed for fever   Generic drug:  acetaminophen

## 2018-08-28 NOTE — ED NOTES
[facility]  ED Nurse Handoff Report    Ayana Prasad is a 28 year old female   ED Chief complaint: Flank Pain  . ED Diagnosis:   Final diagnoses:   Renal colic   Hydronephrosis with urinary obstruction due to ureteral calculus   Nephrolithiasis   Non-intractable vomiting with nausea, unspecified vomiting type     Allergies:   Allergies   Allergen Reactions     Adhesive Tape Hives     Prednisone Other (See Comments) and Hives     Suicidal ideation     Droperidol Anxiety       Code Status: Full Code  Activity level - Baseline/Home:  Independent. Activity Level - Current:   Independent. Lift room needed: No. Bariatric: No   Needed: No   Isolation: No. Infection: Not Applicable.     Vital Signs:   Vitals:    08/28/18 1315 08/28/18 1441 08/28/18 1442 08/28/18 1445   BP:  121/89  130/84   Pulse:       Resp:       Temp:       TempSrc:       SpO2: 99%  93% 90%   Weight:           Cardiac Rhythm:  ,      Pain level: 0-10 Pain Scale: 10  Patient confused: No. Patient Falls Risk: Yes.   Elimination Status: Has voided   Patient Report - Initial Complaint: flank pain. Focused Assessment: Genitourinary - Genitourinary WDL: all Signs/Symptoms: flank pain (left-nausea and vomiting  Tests Performed: imaging/labs. Abnormal Results:   Labs Ordered and Resulted from Time of ED Arrival Up to the Time of Departure from the ED   BASIC METABOLIC PANEL - Abnormal; Notable for the following:        Result Value    Glucose 105 (*)     All other components within normal limits   ROUTINE UA WITH MICROSCOPIC - Abnormal; Notable for the following:     Blood Urine Large (*)     Protein Albumin Urine 30 (*)     Leukocyte Esterase Urine Trace (*)     RBC Urine >182 (*)     Squamous Epithelial /HPF Urine 4 (*)     Mucous Urine Present (*)     All other components within normal limits   PERIPHERAL IV CATHETER   ISTAT HCG QUANTITATIVE PREGNANCY NURSING POCT     .   Treatments provided: see MAR  Family Comments: none  present  OBS brochure/video discussed/provided to patient:  Yes  ED Medications:   Medications   0.9% sodium chloride BOLUS (0 mLs Intravenous Stopped 8/28/18 1257)     Followed by   sodium chloride 0.9% infusion (1,000 mLs Intravenous New Bag 8/28/18 1257)   morphine (PF) injection 4 mg (4 mg Intravenous Given 8/28/18 1230)   tamsulosin (FLOMAX) capsule 0.4 mg (not administered)   ondansetron (ZOFRAN) injection 4 mg (4 mg Intravenous Given 8/28/18 1214)   HYDROmorphone (DILAUDID) injection 1 mg (1 mg Intravenous Given 8/28/18 1244)   ondansetron (ZOFRAN) injection 4 mg (4 mg Intravenous Given 8/28/18 1244)   diphenhydrAMINE (BENADRYL) injection 50 mg (50 mg Intravenous Given 8/28/18 1310)   HYDROmorphone (DILAUDID) injection 1 mg (1 mg Intravenous Given 8/28/18 1311)   LORazepam (ATIVAN) injection 1 mg (1 mg Intravenous Given 8/28/18 1342)   oxyCODONE-acetaminophen (PERCOCET) 5-325 MG per tablet 2 tablet (2 tablets Oral Given 8/28/18 1437)     Drips infusing:  Yes  For the majority of the shift, the patient's behavior Green. Interventions performed were .     Severe Sepsis OR Septic Shock Diagnosis Present: No      ED Nurse Name/Phone Number: Ele Burkett,   3:04 PM    RECEIVING UNIT ED HANDOFF REVIEW    Above ED Nurse Handoff Report was reviewed: Yes  Reviewed by: Vero Rome on August 28, 2018 at 3:38 PM

## 2018-08-28 NOTE — IP AVS SNAPSHOT
[facility]    [address] [address]    Phone:  [phone]    Fax:  [phone]                                       After Visit Summary   8/28/2018    Ayana Prasad    MRN: [id number]           After Visit Summary Signature Page     I have received my discharge instructions, and my questions have been answered. I have discussed any challenges I see with this plan with the nurse or doctor.    ..........................................................................................................................................  Patient/Patient Representative Signature      ..........................................................................................................................................  Patient Representative Print Name and Relationship to Patient    ..................................................               ................................................  Date                                            Time    ..........................................................................................................................................  Reviewed by Signature/Title    ...................................................              ..............................................  Date                                                            Time          22EPIC Rev 08/18

## 2018-08-28 NOTE — PHARMACY-ADMISSION MEDICATION HISTORY
Admission medication history interview status for this patient is complete. See [facility] admission navigator for allergy information, prior to admission medications and immunization status.     Medication history interview source(s):Patient  Medication history resources (including written lists, pill bottles, clinic record): no  Primary pharmacy:  clinic in [place], [place].    Changes made to PTA medication list:  Added: Tylenol prn fever. Latuda.  Deleted: abilify, lunesta, lithium, mirtazapine, prazosin (does not take per pt since March/May of this year, per pharmacy: all were filled on March 30, 2018).  Changed: Gabapentin from 200mg tid to 300mg tid.    Actions taken by pharmacist (provider contacted, etc): called pt's psychologist Dr. Sergio Salas at [phone] and left a message on RN's line - asked to call back to clarify patient's pta regimen. Called  pharmacy at [place].     Additional medication history information: waiting for psychologist office to call back to clarify med regimen.    Medication reconciliation/reorder completed by provider prior to medication history? No    Do you take OTC medications (eg tylenol, ibuprofen, fish oil, eye/ear drops, etc)? Yes (Y/N)    For patients on insulin therapy: No (Y/N)    Prior to Admission medications    Medication Sig Last Dose Taking? Auth Provider   acetaminophen (TYLENOL) 325 MG tablet Take 325-650 mg by mouth every 6 hours as needed for fever 8/27/2018 at Unknown time Yes Unknown, Entered By History   gabapentin (NEURONTIN) 100 MG capsule Take 300 mg by mouth 3 times daily Dose per pt: 300mg tid. Per [facility]: last filled 200mg tid on March 30, 2018. 8/27/2018 at Unknown time Yes Unknown, Entered By History   tretinoin (RETIN-A) 0.05 % cream Apply topically At Bedtime 8/27/2018 at Unknown time Yes Cyn Manzanares APRN CNP   lurasidone (LATUDA) 40 MG TABS tablet Take 40 mg by mouth daily Per pharmacy: Rx filled in May 2018, but never  picked up, so returned back to stock. Per pt: she thinks the dose is 80mg/day, last taken: MONTHS ago. Waiting for Dr. Salas ([phone]) to call back to clarify dose. More than a month at Unknown time  last dose: MONTHS ago per pt. Unknown, Entered By History

## 2018-08-28 NOTE — ED TRIAGE NOTES
Patient presents with complaints of left flank pain which started one hour ago. Patient denies any other unrinary symtpomds. Patient has history of stones and states that this feels similar.  ABC intact without need for intervention at this time.

## 2018-08-28 NOTE — ED PROVIDER NOTES
History     Chief Complaint:  Flank Pain      HPI   Ayana Prasad is a 28 year old female with a history of kidney stones who presents to the emergency department for evaluation of flank pain. Upon evaluation, the patient states that she has had incredible left flank pain, nausea, and vomiting for the past hour underneath her ribcage. She notes that her most recent kidney stone was a year ago and that she tends to have stones 7-8 mm that are difficult to pass. She notes this feels typical of her stone pain in the past. She notes that there is no position that is comfortable and the pain came on suddenly. She denies fever, chills, change in urination, hematuria, reproducible pain, chest pain, shortness of breath, or leg swelling. Her last period was 10 years ago, unable to given reason why.        Allergies:  Prednisone  Droperidol: anxiety     Medications:    Zovirax  Abilify  Lunesta  Neurontin  Lithium  Remeron  Prazosin  Tretinoin     Past Medical History:    ADHD  Bipolar 1 disorder  Chemical injury to cornea  Depressive disorder  GERD  Nephrolithiasis  Polysubstance abuse      Past Surgical History:    Back surgery  ENT surgery   surgery to remove kidney stones  Lithotripsy ureters and stent insertion      Family History:    GI disease  Cancer  Lymphoma        Social History:  Smoking status: Former Smoker 8/1/2011  Alcohol use: No    Marital Status:  Single [1], here alone       Review of Systems   Constitutional: Negative for chills and fever.   Respiratory: Negative for shortness of breath.    Cardiovascular: Negative for chest pain and leg swelling.   Gastrointestinal: Positive for nausea and vomiting.   Genitourinary: Positive for flank pain. Negative for hematuria.   All other systems reviewed and are negative.        Physical Exam     Patient Vitals for the past 24 hrs:   BP Temp Temp src Pulse Resp SpO2 Weight   08/28/18 1445 130/84 - - - - 90 % -   08/28/18 1442 - - - - - 93 % -   08/28/18 1441  121/89 - - - - - -   08/28/18 1315 - - - - - 99 % -   08/28/18 1216 (!) 148/109 98.7  F (37.1  C) Oral 121 20 100 % 95.3 kg (210 lb)   08/28/18 1200 (!) 148/109 - - - - 99 % -         Physical Exam  General: Patient is an adult female, uncomfortable appearing, pacing in the room  Eyes: PERRL, Conjunctive within normal limits  ENT: Moist mucous membranes, oropharynx clear.   CV: Normal S1S2, no murmur, rub or gallop. Regular rate and rhythm  Resp: Clear to auscultation bilaterally, no wheezes, rales or rhonchi. Normal respiratory effort.  GI: Abdomen is soft and nondistended. Mildly tender left mid abdomen. No palpable masses. No rebound or guarding. No CVA tenderness to percussion.  MSK: No edema. Nontender. Normal active range of motion.  Skin: Warm and dry. No rashes or lesions or ecchymoses on visible skin.  Neuro: Alert and oriented. Responds appropriately to all questions and commands. No focal findings appreciated. Normal muscle tone.  Psych: Appropriate mood and affect.         Emergency Department Course       Imaging:  Radiographic findings were communicated with the patient who voiced understanding of the findings.    Abd/Pelcis CT no contrast  IMPRESSION:  1. Left hydronephrosis related to a 4 mm calculus at the ureteropelvic  junction.  2. Bilateral nephrolithiasis.  3. Fatty infiltration of liver.  4. Prior cholecystectomy.  As read by radiology      Laboratory:  UA: Urine blood large, Protein albumin 30, RBC/HPF >182, mucous present, Squamous Epithelial 4 (H), Leukocyte esterase trace o/w negative    BMP:Glucose 105 (H) WNL (Creatinine 0.82)    Interventions:  1257 NaCl infusion 1000ml IV  1230 morphine 4mg IV  1214 Zofran 4mg IV  1244 Dilaudid 1mg IV  1244 Zofran 4mg IV  1310 Beandryl 50mg IV  1311 Dilaudid 1mg IV  1342 Ativan 1mg IV  1437 Percocet 5-325 MG per tablet x2 PO      Emergency Department Course:  Past medical records, nursing notes, and vitals reviewed.  1206: I performed an exam of the  patient and obtained history, as documented above.  IV inserted and blood drawn. Interventions as above.    Patient reassessed. Pacing in the room. Notes pain in not improved.     The patient was sent for a abdominal and pelvic CT while in the emergency department, findings above.    Patient reassessed. 6/10 pain. Barely tolerable per patient. Feels unable to cope at home.     Findings and plan explained to the Patient who consents to admission.     1508: Discussed the patient with Linh BURNETTE , who will admit the patient to a observation bed for further monitoring, evaluation, and treatment.         Impression & Plan      Medical Decision Making:  Ayana Prasad is a 28 year old female with a history of kidney stones who presents to the emergency department with concerns for left flank pain sudden onset with associated nausea, vomiting very similar to her previous pain with renal colic. She has clinical exam consistent with renal colic and a CT scan confirms this. Fortunately the stone is smaller and distal but she has required multiple IV doses of narcotics to control her pain. In discussion with her, she has abused narcotics in the past (heroin 5yrs ago), denying any ongoing use. It seems reasonable to treat the patient's acute pain given the severity she is experiencing. There is no indication for antibiotics as she has no evidence of infection. She has normal renal function.  She is having intractable pain therefore will be admitted for ongoing pain control.        Diagnosis:    ICD-10-CM    1. Renal colic N23    2. Hydronephrosis with urinary obstruction due to ureteral calculus N13.2    3. Nephrolithiasis N20.0    4. Non-intractable vomiting with nausea, unspecified vomiting type R11.2        Disposition:  Admitted to observation bed        Valentino Hawkins  8/28/2018   [facility] EMERGENCY DEPARTMENT    Scribe Disclosure:  Valentino BLACK, am serving as a scribe at 12:06 PM on 8/28/2018 to  document services personally performed by Yaquelin Chapman MD based on my observations and the provider's statements to me.          Yaquelin Chapman MD  08/29/18 1070

## 2018-08-29 PROBLEM — N20.0 NEPHROLITHIASIS: Status: ACTIVE | Noted: 2018-08-29

## 2018-08-29 LAB
ANION GAP SERPL CALCULATED.3IONS-SCNC: 7 MMOL/L (ref 3–14)
BUN SERPL-MCNC: 11 MG/DL (ref 7–30)
CALCIUM SERPL-MCNC: 8.4 MG/DL (ref 8.5–10.1)
CHLORIDE SERPL-SCNC: 111 MMOL/L (ref 94–109)
CO2 SERPL-SCNC: 25 MMOL/L (ref 20–32)
CREAT SERPL-MCNC: 0.78 MG/DL (ref 0.52–1.04)
ERYTHROCYTE [DISTWIDTH] IN BLOOD BY AUTOMATED COUNT: 13.1 % (ref 10–15)
GFR SERPL CREATININE-BSD FRML MDRD: 88 ML/MIN/1.7M2
GLUCOSE SERPL-MCNC: 88 MG/DL (ref 70–99)
HCT VFR BLD AUTO: 38.4 % (ref 35–47)
HGB BLD-MCNC: 12.5 G/DL (ref 11.7–15.7)
MCH RBC QN AUTO: 28 PG (ref 26.5–33)
MCHC RBC AUTO-ENTMCNC: 32.6 G/DL (ref 31.5–36.5)
MCV RBC AUTO: 86 FL (ref 78–100)
PLATELET # BLD AUTO: 257 10E9/L (ref 150–450)
POTASSIUM SERPL-SCNC: 3.7 MMOL/L (ref 3.4–5.3)
RBC # BLD AUTO: 4.47 10E12/L (ref 3.8–5.2)
SODIUM SERPL-SCNC: 143 MMOL/L (ref 133–144)
WBC # BLD AUTO: 8.9 10E9/L (ref 4–11)

## 2018-08-29 PROCEDURE — 40000275 ZZH STATISTIC RCP TIME EA 10 MIN

## 2018-08-29 PROCEDURE — G0378 HOSPITAL OBSERVATION PER HR: HCPCS

## 2018-08-29 PROCEDURE — 25000132 ZZH RX MED GY IP 250 OP 250 PS 637: Performed by: INTERNAL MEDICINE

## 2018-08-29 PROCEDURE — 85027 COMPLETE CBC AUTOMATED: CPT | Performed by: PHYSICIAN ASSISTANT

## 2018-08-29 PROCEDURE — 96376 TX/PRO/DX INJ SAME DRUG ADON: CPT

## 2018-08-29 PROCEDURE — 80048 BASIC METABOLIC PNL TOTAL CA: CPT | Performed by: PHYSICIAN ASSISTANT

## 2018-08-29 PROCEDURE — 25000125 ZZHC RX 250: Performed by: PHYSICIAN ASSISTANT

## 2018-08-29 PROCEDURE — 36415 COLL VENOUS BLD VENIPUNCTURE: CPT | Performed by: PHYSICIAN ASSISTANT

## 2018-08-29 PROCEDURE — 99225 ZZC SUBSEQUENT OBSERVATION CARE,LEVEL II: CPT | Performed by: INTERNAL MEDICINE

## 2018-08-29 PROCEDURE — 99207 ZZC CDG-CODE CATEGORY CHANGED: CPT | Performed by: INTERNAL MEDICINE

## 2018-08-29 PROCEDURE — 96374 THER/PROPH/DIAG INJ IV PUSH: CPT

## 2018-08-29 PROCEDURE — 93005 ELECTROCARDIOGRAM TRACING: CPT

## 2018-08-29 PROCEDURE — 25000132 ZZH RX MED GY IP 250 OP 250 PS 637: Performed by: PHYSICIAN ASSISTANT

## 2018-08-29 PROCEDURE — 25000128 H RX IP 250 OP 636: Performed by: PHYSICIAN ASSISTANT

## 2018-08-29 RX ADMIN — NICOTINE 1 PATCH: 21 PATCH, EXTENDED RELEASE TRANSDERMAL at 08:00

## 2018-08-29 RX ADMIN — Medication 0.5 MG: at 23:43

## 2018-08-29 RX ADMIN — TAMSULOSIN HYDROCHLORIDE 0.4 MG: 0.4 CAPSULE ORAL at 01:46

## 2018-08-29 RX ADMIN — DOCUSATE SODIUM 100 MG: 100 CAPSULE, LIQUID FILLED ORAL at 19:43

## 2018-08-29 RX ADMIN — SODIUM CHLORIDE, POTASSIUM CHLORIDE, SODIUM LACTATE AND CALCIUM CHLORIDE: 600; 310; 30; 20 INJECTION, SOLUTION INTRAVENOUS at 22:39

## 2018-08-29 RX ADMIN — TAMSULOSIN HYDROCHLORIDE 0.4 MG: 0.4 CAPSULE ORAL at 07:58

## 2018-08-29 RX ADMIN — SODIUM CHLORIDE, POTASSIUM CHLORIDE, SODIUM LACTATE AND CALCIUM CHLORIDE: 600; 310; 30; 20 INJECTION, SOLUTION INTRAVENOUS at 01:46

## 2018-08-29 RX ADMIN — HYDROCODONE BITARTRATE AND ACETAMINOPHEN 2 TABLET: 5; 325 TABLET ORAL at 13:50

## 2018-08-29 RX ADMIN — HYDROCODONE BITARTRATE AND ACETAMINOPHEN 2 TABLET: 5; 325 TABLET ORAL at 04:52

## 2018-08-29 RX ADMIN — GABAPENTIN 300 MG: 300 CAPSULE ORAL at 19:43

## 2018-08-29 RX ADMIN — GABAPENTIN 300 MG: 300 CAPSULE ORAL at 13:51

## 2018-08-29 RX ADMIN — ONDANSETRON 4 MG: 4 TABLET, ORALLY DISINTEGRATING ORAL at 12:35

## 2018-08-29 RX ADMIN — SODIUM CHLORIDE, POTASSIUM CHLORIDE, SODIUM LACTATE AND CALCIUM CHLORIDE: 600; 310; 30; 20 INJECTION, SOLUTION INTRAVENOUS at 12:31

## 2018-08-29 RX ADMIN — ONDANSETRON 4 MG: 4 TABLET, ORALLY DISINTEGRATING ORAL at 23:49

## 2018-08-29 RX ADMIN — HYDROCODONE BITARTRATE AND ACETAMINOPHEN 2 TABLET: 5; 325 TABLET ORAL at 18:24

## 2018-08-29 RX ADMIN — GABAPENTIN 300 MG: 300 CAPSULE ORAL at 07:59

## 2018-08-29 RX ADMIN — Medication 0.5 MG: at 19:45

## 2018-08-29 RX ADMIN — HYDROCODONE BITARTRATE AND ACETAMINOPHEN 2 TABLET: 5; 325 TABLET ORAL at 22:32

## 2018-08-29 RX ADMIN — HYDROCODONE BITARTRATE AND ACETAMINOPHEN 2 TABLET: 5; 325 TABLET ORAL at 09:53

## 2018-08-29 NOTE — PROGRESS NOTES
Skin affirmation note    Admitting nurse completed full skin assessment, Moiz score and Moiz interventions. This writer agrees with the initial skin assessment findings.

## 2018-08-29 NOTE — PROGRESS NOTES
Pain improved, denies any nausea. Declined pain medication when offered X 2. Stated she would call when she needed more. IV infusing. Patient no longer needed oxygen when sleeping, saturation >92% on room air awake or asleep. Voiding without difficulty, urine strained for stones. Using call light appropriately.

## 2018-08-29 NOTE — PROGRESS NOTES
[facility] [facility] ADMISSION NOTE    Patient admitted to room 2302 at approximately 2000 via cart from [facility] / ambulance. Patient was accompanied by other:EMS.     Verbal SBAR report received from Mat and EMS staff prior to patient arrival.     Patient ambulated to bed independently. Patient alert and oriented X 3. Pain is not well controlled.  Medication(s) being used: narcotic analgesics including hydromorphone (Dilaudid). 0-10 Pain Scale: 7. Admission vital signs: Blood pressure 164/81, pulse 89, temperature 98.1  F (36.7  C), temperature source Oral, resp. rate 16, SpO2 97 %, not currently breastfeeding. Patient was oriented to plan of care, call light, bed controls, tv, telephone, bathroom and visiting hours.     Risk Assessment    The following safety risks were identified during admission: fall. Yellow risk band applied: YES.     Skin Initial Assessment    This writer admitted this patient and completed a full skin assessment and Moiz score in the Adult PCS flowsheet. Appropriate interventions initiated as needed.     Secondary skin check completed by Cristina Alaniz RN .    Skin  Inspection of bony prominences: Full  Inspection under devices: Full  Skin WDL: WDL    Moiz Risk Assessment  Sensory Perception: 4-->no impairment  Moisture: 4-->rarely moist  Activity: 4-->walks frequently  Mobility: 4-->no limitation  Nutrition: 3-->adequate  Friction and Shear: 3-->no apparent problem  Moiz Score: 22  Bed Support Surface: Atmos Air mattress    Vane aWtson

## 2018-08-29 NOTE — CONSULTS
Consult Date:  08/29/2018      REASON FOR CONSULTATION:  I was asked to see Ms. Ayana Prasad by Dr. Rafael Davis for a left ureteral stone.      HISTORY OF PRESENT ILLNESS:  The patient is a 28-year-old woman with a history of kidney stones in the past who has been previously treated by my partner, Dr. Jett, for kidney stones via ureteroscopy back in 2015.  The patient notes that she developed acute onset of left-sided flank pain with some nausea and vomiting the day prior to admission.  CT imaging demonstrated a 4 x 4 mm left stone in the proximal left ureter.  The patient was also noted to have bilateral nonobstructing stones in the kidneys as well.  The patient was admitted for pain control and has been attempting to pass the stone on her own thus far.  The patient notes that she did require a dose of IV pain medication very early this morning or late last night and has otherwise only received 1 dose of oral pain medication earlier in the day today.  She currently notes that she continues to have some degree of discomfort, but it is not as bad as it was when she had initially presented to the ER.      PAST MEDICAL HISTORY:  Otherwise significant for, again, the history of kidney stones, auditory hallucinations, anxiety, schizoaffective disorder, bipolar disorder, PTSD, cauda equina syndrome with neurogenic bladder, optic neuritis, insomnia, polysubstance abuse, ADHD.      PAST SURGICAL HISTORY:  Includes the ureteroscopy.  She has had back surgery, ENT surgery.      MEDICATIONS:  Include Zovirax, Abilify, Lunesta, Neurontin, lithium, Remeron, prazosin, tretinoin, Flomax, Tylenol, Norco, Narcan, Nicoderm, Compazine.      ALLERGIES:  ADHESIVE TAPE, PREDNISONE AND DROPERIDOL.      REVIEW OF SYSTEMS:  Positive for fever in the context of an upper respiratory infection and/or the flu over the weekend.  Positive for nausea and vomiting as above.  Negative for unexplained weight changes.      PHYSICAL EXAMINATION:    VITAL SIGNS:  Temperature is 98.2, heart rate 77, blood pressure 135/82.   ABDOMEN:  Soft, nontender.  There is some mild flank tenderness on the left side to percussion.        LABORATORY AND IMAGING DATA:  The patient's CT scan from yesterday is as noted above.  The patient's creatinine today is 0.78.  White blood cell count was 8.9, negative urine pregnancy test from yesterday.  Urinalysis shows greater than 182 red blood cells, 1 white blood cell, 4 squamous cells per high-power field.      ASSESSMENT AND PLAN:  Over half of today's 45-minute consult was spent counseling the patient regarding her ureteral stone and kidney stones.  I suggested to Ms. Prasad that at this point in time, there is no absolute indication to need to intervene.  At the moment, her pain is being adequately controlled with a minimal amount of oral pain medication and she is afebrile and has no signs of infection.  Therefore, we have the option of allowing the patient to try to continue to pass the stone.  We discussed whether the patient wishes to go home to continue to try to pass the stone at home or not.  The patient notes she is worried about the severe pain coming back.  Therefore, I discussed with the patient that another option would be to observe the patient overnight for continued pain management and then we will reassess the patient in the morning in terms of whether she needs a stent depending on how she does overnight.  If she needs a stent, then we would do so sometime over the course of the day tomorrow.  The patient may eat tonight, but should be kept n.p.o. after midnight tonight in preparation for possible stenting tomorrow if need be.  However, if the patient is able to be discharged tomorrow, we would see the patient back in roughly 2 weeks after she has attempted to pass the stone in the meantime.  If the stone is not passed at that point in time, we will make efforts to schedule a time for the patient to come to the  operating room for possible management of her stones.  Lastly, we did discuss that stone prevention would be very important for the patient going forward, as she has shown herself to be a repeat stone former.  We would recommend her follow up in the [facility] at the [facility] for long-term prevention care.         HODAN OLIVERA MD             D: 2018   T: 2018   MT: BRIE      Name:     SOMMER GARCIA   MRN:      [id number]        Account:       [id number]   :      1990           Consult Date:  2018      Document: [id number]       cc: Rafael Davis MD

## 2018-08-29 NOTE — PROGRESS NOTES
[facility] Internal Medicine Progress Note     Date of Service (when I saw the patient): 08/29/2018    REASON FOR ADMISSION / INTERVAL HISTORY:  L flank pain. H/o kidney stones--prior stenting. Has L uretero pelvic stone now. Continues to have pain. See details below.    ASSESSMENT/PLAN:   Urolithiasis with hydronephrosis  Admit CT abdomen & pelvis showing left hydronephrosis with 4 mm stone at ureteropelvic junction, in addition to other bilateral nephrolithiasis. Admit UA showing hematuria and RBC > 182, but no pyuria or findings of infection. Afebrile, no leukocytosis.  Continues to have pain. Urology to see today. Will continue fluids/ flomax     Cough  Coarse lungs on exam, patient mentions recently having influenza (not diagnosed by formal testing). Chest x-ray ordered and was unremarkable.  - Symptomatic cares for cough        ADHD (attention deficit hyperactivity disorder)  Bipolar 1 disorder, manic, mild  Schizoaffective disorder, bipolar type (H)  PTSD (post-traumatic stress disorder)  Anxiety  Mental status stable and appropriate upon admission. Patient is taking Latuda prior to admission, but was formerly on multiple other psychiatric medications.  - Continue prior to admission Latuda  - Defer other psychiatric medication management to outpatient providers     DISPO  Home today or in AM after urology sees     OXANA MEEHAN MD   Pg [phone]    DVT Prhylaxis: Low Risk/Ambulatory with no VTE prophylaxis indicated  Code Status: Full Code    ROS:  As described in A/P and Exam.  Otherwise ALL are  negative.    PHYSICAL EXAM:  All vitals have been reviewed    Blood pressure 115/71, pulse 82, temperature 97.7  F (36.5  C), temperature source Oral, resp. rate 18, SpO2 94 %, not currently breastfeeding.         GENERAL APPEARANCE: healthy, alert and no distress  EYES: conjunctiva clear, eyes grossly normal  HENT: external ears and nose normal   NECK: supple, no masses or adenopathy  RESP: lungs clear to  auscultation - no rales, rhonchi or wheezes  CV: regular rate and rhythm, normal S1 S2, no S3 or S4 and no murmur, click or rub   ABDOMEN: soft, nontender, no HSM or masses and bowel sounds normal  MS: no clubbing, cyanosis; no edema  SKIN: clear without significant rashes or lesions  NEURO: -non-focal moves all 4 extr    ROUTINE  LABS (Last four results)  CMP  Recent Labs  Lab 08/29/18  0532 08/28/18  1218    139   POTASSIUM 3.7 4.1   CHLORIDE 111* 108   CO2 25 20   ANIONGAP 7 11   GLC 88 105*   BUN 11 13   CR 0.78 0.82   GFRESTIMATED 88 84   GFRESTBLACK >90 >90   WILLIAMS 8.4* 9.1     CBC  Recent Labs  Lab 08/29/18  0532 08/28/18  1217   WBC 8.9 10.3   RBC 4.47 5.51*   HGB 12.5 15.5   HCT 38.4 46.4   MCV 86 84   MCH 28.0 28.1   MCHC 32.6 33.4   RDW 13.1 13.3    363     INRNo lab results found in last 7 days.  Arterial Blood GasNo lab results found in last 7 days.    Recent Results (from the past 24 hour(s))   XR Chest Port 1 View    Narrative    CHEST PORTABLE ONE VIEW   8/28/2018 9:46 PM     HISTORY: Cough and fevers.     COMPARISON: Chest x-ray 3/24/2013.      Impression    IMPRESSION: Portable chest. Lungs are clear. Heart is normal in size.  No pneumothorax.    TATIANA ALMONTE MD

## 2018-08-29 NOTE — PROGRESS NOTES
[facility]    Hospital Medicine   Cross Cover Note  Date of Service: 8/29/2018     Had discussion with Dr. Ulrich (urology) regarding plan of care. After his evaluation of patient, he agreed to have her stay one more night for pain control. He plans to see her first thing tomorrow morning. There is a possibilty she may have a ureteral stent placed; he does not feel she requires placement at this time but it is something he is willing to do if patient insists.    Plan:  - Pain control overnight  - Dr. Ulrich to assess patient first thing in the morning  - Patient may possibly have stent placement tomorrow at some point based on availability, but hopefully will be able to go home without one unless patient insists  - No changes to plans otherwise      Shala Buchanan PA-C  [facility]  Pager: [phone]

## 2018-08-29 NOTE — PLAN OF CARE
Problem: Patient Care Overview  Goal: Plan of Care/Patient Progress Review  Outcome: Improving  Patient up in room independently.  Voiding without difficulty.  Straining urine with some sediment noted upon last void.  Flank pain continues.  Some relief noted with Norco, ice, and ambulation.  Let's needs be known.

## 2018-08-29 NOTE — CONSULTS
CTS consulted because patient had questions about being restricted to [facility]. Per patient she is restricted to going to hospitals within 30 miles of her residence. She has been restricted to [facility] however she had a recent move moving her closer to [facility]. Encouraged patient to contact her insurance plan to see if she can have her restriction changed to a facility closer to her new address. No other CTS needs identified.    Nancy Fleming, MSN, RN, Care Coordinator  [facility] [phone]  [facility] [phone]

## 2018-08-29 NOTE — PROGRESS NOTES
Patient placed on 2 lpm oxygen due to saturation dropping to 90% when sleeping after receiving IV Dilaudid.
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
## 2018-08-29 NOTE — PLAN OF CARE
Problem: Patient Care Overview  Goal: Plan of Care/Patient Progress Review  Outcome: Improving  Patient is independent in her room. Reports minimal pain, managed with PRN norco. Straining urine, no sediment or stone noted. VS stable on room air. Pt declined full skin assessment. /82 (BP Location: Right arm)  Pulse 77  Temp 98.2  F (36.8  C) (Oral)  Resp 18  SpO2 95%

## 2018-08-30 ENCOUNTER — APPOINTMENT (OUTPATIENT)
Dept: GENERAL RADIOLOGY | Facility: CLINIC | Age: 28
End: 2018-08-30
Attending: UROLOGY
Payer: COMMERCIAL

## 2018-08-30 ENCOUNTER — ANESTHESIA (OUTPATIENT)
Dept: SURGERY | Facility: CLINIC | Age: 28
End: 2018-08-30
Payer: COMMERCIAL

## 2018-08-30 ENCOUNTER — ANESTHESIA EVENT (OUTPATIENT)
Dept: SURGERY | Facility: CLINIC | Age: 28
End: 2018-08-30
Payer: COMMERCIAL

## 2018-08-30 VITALS
RESPIRATION RATE: 14 BRPM | TEMPERATURE: 97.9 F | OXYGEN SATURATION: 96 % | SYSTOLIC BLOOD PRESSURE: 126 MMHG | DIASTOLIC BLOOD PRESSURE: 79 MMHG | HEART RATE: 90 BPM

## 2018-08-30 DIAGNOSIS — N20.2 CALCULUS OF KIDNEY WITH CALCULUS OF URETER: ICD-10-CM

## 2018-08-30 PROCEDURE — C2617 STENT, NON-COR, TEM W/O DEL: HCPCS | Performed by: UROLOGY

## 2018-08-30 PROCEDURE — 37000008 ZZH ANESTHESIA TECHNICAL FEE, 1ST 30 MIN: Performed by: UROLOGY

## 2018-08-30 PROCEDURE — C1769 GUIDE WIRE: HCPCS | Performed by: UROLOGY

## 2018-08-30 PROCEDURE — 99207 ZZC CDG-CODE CATEGORY CHANGED: CPT | Performed by: INTERNAL MEDICINE

## 2018-08-30 PROCEDURE — 40000305 ZZH STATISTIC PRE PROC ASSESS I: Performed by: UROLOGY

## 2018-08-30 PROCEDURE — 52332 CYSTOSCOPY AND TREATMENT: CPT | Mod: LT | Performed by: UROLOGY

## 2018-08-30 PROCEDURE — 25000125 ZZHC RX 250: Performed by: NURSE ANESTHETIST, CERTIFIED REGISTERED

## 2018-08-30 PROCEDURE — 27210794 ZZH OR GENERAL SUPPLY STERILE: Performed by: UROLOGY

## 2018-08-30 PROCEDURE — 40000277 XR SURGERY CARM FLUORO LESS THAN 5 MIN W STILLS: Mod: TC

## 2018-08-30 PROCEDURE — 25000132 ZZH RX MED GY IP 250 OP 250 PS 637: Performed by: PHYSICIAN ASSISTANT

## 2018-08-30 PROCEDURE — 25000128 H RX IP 250 OP 636: Performed by: UROLOGY

## 2018-08-30 PROCEDURE — 25000128 H RX IP 250 OP 636: Performed by: PHYSICIAN ASSISTANT

## 2018-08-30 PROCEDURE — 25000128 H RX IP 250 OP 636: Performed by: NURSE ANESTHETIST, CERTIFIED REGISTERED

## 2018-08-30 PROCEDURE — 99217 ZZC OBSERVATION CARE DISCHARGE: CPT | Performed by: INTERNAL MEDICINE

## 2018-08-30 PROCEDURE — 25000132 ZZH RX MED GY IP 250 OP 250 PS 637: Performed by: UROLOGY

## 2018-08-30 PROCEDURE — G0378 HOSPITAL OBSERVATION PER HR: HCPCS

## 2018-08-30 PROCEDURE — 71000012 ZZH RECOVERY PHASE 1 LEVEL 1 FIRST HR: Performed by: UROLOGY

## 2018-08-30 PROCEDURE — 36000054 ZZH SURGERY LEVEL 2 W FLUORO 1ST 30 MIN: Performed by: UROLOGY

## 2018-08-30 DEVICE — STENT URETERAL PERCUFLEX PLUS 6FRX26CM M0061752630
Type: IMPLANTABLE DEVICE | Site: URETER | Status: NON-FUNCTIONAL
Removed: 2018-10-14

## 2018-08-30 RX ORDER — CEFAZOLIN SODIUM 2 G/100ML
2 INJECTION, SOLUTION INTRAVENOUS
Status: COMPLETED | OUTPATIENT
Start: 2018-08-30 | End: 2018-08-30

## 2018-08-30 RX ORDER — HYDROCODONE BITARTRATE AND ACETAMINOPHEN 5; 325 MG/1; MG/1
1-2 TABLET ORAL EVERY 4 HOURS PRN
Qty: 20 TABLET | Refills: 0 | Status: SHIPPED | OUTPATIENT
Start: 2018-08-30 | End: 2018-08-30

## 2018-08-30 RX ORDER — HYDROCODONE BITARTRATE AND ACETAMINOPHEN 5; 325 MG/1; MG/1
1-2 TABLET ORAL EVERY 4 HOURS PRN
Qty: 20 TABLET | Refills: 0 | Status: SHIPPED | OUTPATIENT
Start: 2018-08-30 | End: 2018-09-04

## 2018-08-30 RX ORDER — KETAMINE HYDROCHLORIDE 50 MG/ML
INJECTION, SOLUTION INTRAMUSCULAR; INTRAVENOUS PRN
Status: DISCONTINUED | OUTPATIENT
Start: 2018-08-30 | End: 2018-08-30

## 2018-08-30 RX ORDER — OXYBUTYNIN CHLORIDE 5 MG/1
5 TABLET ORAL 2 TIMES DAILY PRN
Qty: 360 TABLET | Refills: 0 | Status: SHIPPED | OUTPATIENT
Start: 2018-08-30 | End: 2018-08-30

## 2018-08-30 RX ORDER — OXYBUTYNIN CHLORIDE 5 MG/1
5 TABLET ORAL 2 TIMES DAILY PRN
Qty: 60 TABLET | Refills: 0 | Status: SHIPPED | OUTPATIENT
Start: 2018-08-30 | End: 2018-10-13

## 2018-08-30 RX ORDER — LIDOCAINE HYDROCHLORIDE 10 MG/ML
INJECTION, SOLUTION INFILTRATION; PERINEURAL PRN
Status: DISCONTINUED | OUTPATIENT
Start: 2018-08-30 | End: 2018-08-30

## 2018-08-30 RX ORDER — SCOLOPAMINE TRANSDERMAL SYSTEM 1 MG/1
1 PATCH, EXTENDED RELEASE TRANSDERMAL ONCE
Status: COMPLETED | OUTPATIENT
Start: 2018-08-30 | End: 2018-08-30

## 2018-08-30 RX ORDER — HYDROCODONE BITARTRATE AND ACETAMINOPHEN 5; 325 MG/1; MG/1
2 TABLET ORAL EVERY 6 HOURS PRN
Status: DISCONTINUED | OUTPATIENT
Start: 2018-08-30 | End: 2018-08-30 | Stop reason: HOSPADM

## 2018-08-30 RX ORDER — CEFAZOLIN SODIUM 1 G/50ML
1 INJECTION, SOLUTION INTRAVENOUS SEE ADMIN INSTRUCTIONS
Status: DISCONTINUED | OUTPATIENT
Start: 2018-08-30 | End: 2018-08-30 | Stop reason: HOSPADM

## 2018-08-30 RX ORDER — TAMSULOSIN HYDROCHLORIDE 0.4 MG/1
0.4 CAPSULE ORAL DAILY
Qty: 60 CAPSULE | Refills: 0 | Status: SHIPPED | OUTPATIENT
Start: 2018-08-31 | End: 2018-08-30

## 2018-08-30 RX ORDER — DEXAMETHASONE SODIUM PHOSPHATE 4 MG/ML
INJECTION, SOLUTION INTRA-ARTICULAR; INTRALESIONAL; INTRAMUSCULAR; INTRAVENOUS; SOFT TISSUE PRN
Status: DISCONTINUED | OUTPATIENT
Start: 2018-08-30 | End: 2018-08-30

## 2018-08-30 RX ORDER — FENTANYL CITRATE 50 UG/ML
INJECTION, SOLUTION INTRAMUSCULAR; INTRAVENOUS PRN
Status: DISCONTINUED | OUTPATIENT
Start: 2018-08-30 | End: 2018-08-30

## 2018-08-30 RX ORDER — TAMSULOSIN HYDROCHLORIDE 0.4 MG/1
0.4 CAPSULE ORAL DAILY
Qty: 60 CAPSULE | Refills: 0 | Status: ON HOLD | OUTPATIENT
Start: 2018-08-31 | End: 2018-10-13

## 2018-08-30 RX ORDER — PROPOFOL 10 MG/ML
INJECTION, EMULSION INTRAVENOUS CONTINUOUS PRN
Status: DISCONTINUED | OUTPATIENT
Start: 2018-08-30 | End: 2018-08-30

## 2018-08-30 RX ADMIN — HYDROCODONE BITARTRATE AND ACETAMINOPHEN 2 TABLET: 5; 325 TABLET ORAL at 08:03

## 2018-08-30 RX ADMIN — MIDAZOLAM 2 MG: 1 INJECTION INTRAMUSCULAR; INTRAVENOUS at 11:49

## 2018-08-30 RX ADMIN — TAMSULOSIN HYDROCHLORIDE 0.4 MG: 0.4 CAPSULE ORAL at 08:03

## 2018-08-30 RX ADMIN — GABAPENTIN 300 MG: 300 CAPSULE ORAL at 08:03

## 2018-08-30 RX ADMIN — ONDANSETRON 4 MG: 2 INJECTION INTRAMUSCULAR; INTRAVENOUS at 12:01

## 2018-08-30 RX ADMIN — GABAPENTIN 300 MG: 300 CAPSULE ORAL at 14:22

## 2018-08-30 RX ADMIN — NICOTINE POLACRILEX 2 MG: 2 GUM, CHEWING ORAL at 01:25

## 2018-08-30 RX ADMIN — KETAMINE HYDROCHLORIDE 25 MG: 50 INJECTION, SOLUTION INTRAMUSCULAR; INTRAVENOUS at 11:52

## 2018-08-30 RX ADMIN — HYDROCODONE BITARTRATE AND ACETAMINOPHEN 2 TABLET: 5; 325 TABLET ORAL at 02:36

## 2018-08-30 RX ADMIN — HYDROCODONE BITARTRATE AND ACETAMINOPHEN 2 TABLET: 5; 325 TABLET ORAL at 12:59

## 2018-08-30 RX ADMIN — SCOPALAMINE 1 PATCH: 1 PATCH, EXTENDED RELEASE TRANSDERMAL at 11:39

## 2018-08-30 RX ADMIN — LIDOCAINE HYDROCHLORIDE 50 MG: 10 INJECTION, SOLUTION INFILTRATION; PERINEURAL at 11:55

## 2018-08-30 RX ADMIN — SODIUM CHLORIDE, POTASSIUM CHLORIDE, SODIUM LACTATE AND CALCIUM CHLORIDE: 600; 310; 30; 20 INJECTION, SOLUTION INTRAVENOUS at 08:06

## 2018-08-30 RX ADMIN — MIDAZOLAM 2 MG: 1 INJECTION INTRAMUSCULAR; INTRAVENOUS at 11:46

## 2018-08-30 RX ADMIN — FENTANYL CITRATE 100 MCG: 50 INJECTION, SOLUTION INTRAMUSCULAR; INTRAVENOUS at 11:55

## 2018-08-30 RX ADMIN — SODIUM CHLORIDE, POTASSIUM CHLORIDE, SODIUM LACTATE AND CALCIUM CHLORIDE: 600; 310; 30; 20 INJECTION, SOLUTION INTRAVENOUS at 09:42

## 2018-08-30 RX ADMIN — PROPOFOL 100 MCG/KG/MIN: 10 INJECTION, EMULSION INTRAVENOUS at 11:55

## 2018-08-30 RX ADMIN — MIDAZOLAM 1 MG: 1 INJECTION INTRAMUSCULAR; INTRAVENOUS at 11:51

## 2018-08-30 RX ADMIN — KETAMINE HYDROCHLORIDE 25 MG: 50 INJECTION, SOLUTION INTRAMUSCULAR; INTRAVENOUS at 11:55

## 2018-08-30 RX ADMIN — NICOTINE POLACRILEX 2 MG: 2 GUM, CHEWING ORAL at 08:50

## 2018-08-30 RX ADMIN — MIDAZOLAM 2 MG: 1 INJECTION INTRAMUSCULAR; INTRAVENOUS at 11:55

## 2018-08-30 RX ADMIN — CEFAZOLIN SODIUM 2 G: 2 INJECTION, SOLUTION INTRAVENOUS at 11:47

## 2018-08-30 ASSESSMENT — LIFESTYLE VARIABLES: TOBACCO_USE: 1

## 2018-08-30 NOTE — PROGRESS NOTES
[facility]    Hospital Medicine   Cross Cover Note  Date of Service: 8/30/2018     I was called due to chest pain.    Acute onset of mid sternal chest pain and shortness of breath. VSS. EKG checked showing normal sinus rhythm without ST changes.     Chest pain improving. Patient able to ambulate in song, no reported exertional pain. Low suspicion for acute coronary syndrome at this time. No troponin checked. Suspect related to anxiety. No change to plan of care.      Shala Buchanan PA-C  [facility] Hospitalist Service  Pager: [phone]

## 2018-08-30 NOTE — PLAN OF CARE
[facility] TRANSPORT NOTE  Data:   Reason for Transport:  Return from surgery    Ayana Prasad was transported to 2302 via wheel chair at 1310.  Patient was accompanied by Registered Nurse. Equipment used for transport: None. Family was aware of reason for transport: yes    Action:  Report: received from SRINIVAS Mathews    Response:  Patient's condition upon return to unit was stable.    Cyn La

## 2018-08-30 NOTE — PHARMACY - DISCHARGE MEDICATION RECONCILIATION
Discharge medication review for this patient is complete. Pharmacist assisted with medication reconciliation of discharge medications with prior to admission medications.     The following changes were made to the discharge medication list based on pharmacist review:  Added:  none  Discontinued: none  Changed: none      Patient's Discharge Medication List  - medications as listed on After Visit Summary (AVS)     Review of your medicines      START taking       Dose / Directions    HYDROcodone-acetaminophen 5-325 MG per tablet   Commonly known as:  NORCO        Dose:  1-2 tablet   Take 1-2 tablets by mouth every 4 hours as needed for other (pain control or improvement in physical function.)   Quantity:  20 tablet   Refills:  0       oxybutynin 5 MG tablet   Commonly known as:  DITROPAN        Dose:  5 mg   Take 1 tablet (5 mg) by mouth 2 times daily as needed for bladder spasms   Quantity:  360 tablet   Refills:  0       tamsulosin 0.4 MG capsule   Commonly known as:  FLOMAX        Dose:  0.4 mg   Start taking on:  8/31/2018   Take 1 capsule (0.4 mg) by mouth daily   Quantity:  60 capsule   Refills:  0         CONTINUE these medicines which have NOT CHANGED       Dose / Directions    gabapentin 100 MG capsule   Commonly known as:  NEURONTIN        Dose:  300 mg   Take 300 mg by mouth 3 times daily Dose per pt: 300mg tid. Per [facility]: last filled 200mg tid on March 30, 2018.   Refills:  0       tretinoin 0.05 % cream   Commonly known as:  RETIN-A        Apply topically At Bedtime   Refills:  0       TYLENOL 325 MG tablet   Generic drug:  acetaminophen        Dose:  325-650 mg   Take 325-650 mg by mouth every 6 hours as needed for fever   Refills:  0            Where to get your medicines      These medications were sent to [facility] [address]  [address] [address]     Phone:  [phone]      oxybutynin 5 MG tablet     tamsulosin 0.4 MG  capsule         Some of these will need a paper prescription and others can be bought over the counter. Ask your nurse if you have questions.     Bring a paper prescription for each of these medications      HYDROcodone-acetaminophen 5-325 MG per tablet

## 2018-08-30 NOTE — PLAN OF CARE
[facility] TRANSPORT NOTE  Data:   Reason for Transport:  Stent placement    Ayana Prasad was transported to [facility] Room 2 via ambulation at 1100.  Patient was accompanied by Nursing Assistant. Equipment used for transport: None. Family was aware of reason for transport: yes    Action:  Report: given to SRINIVAS Mathews    Response:  Patient's condition when transferred off unit was stable.    Cyn La

## 2018-08-30 NOTE — PLAN OF CARE
Problem: Patient Care Overview  Goal: Plan of Care/Patient Progress Review  Outcome: No Change  Pain improved from 8/10 to 4/10 in left flank following second dose of ivp dilaudid.  Norco given every 4 hrs.  Urine strained during shift without any evidence of stone or sediment.  Patient has denied chest pain during night.  Coughing intermittently, lung sounds course.  Oxygen saturations stable on room air.  Patient denies shortenss of air.  Requested change from nicotine patch to nicorette gum.  Order obtained.  Up with stand by assist to bathroom following dilaudid dosing.  Denies lightheadedness/dizziness during night.  Bed alarm on for safety.

## 2018-08-30 NOTE — PLAN OF CARE
Problem: Patient Care Overview  Goal: Plan of Care/Patient Progress Review  Outcome: Improving  Pt A-febrile. Vitally stable. Pt had c/o chest pain once this evening, sudden onset, EKG was completed and provider was notified, pain has improved. Dizziness was also reported per patient once this evening after going for a walk in the hallway, alarms audible on bed after. Pt on regular diet, but NPO after midnight. Norco scheduled for pain, Dilaudid is available. Ice utilized as well. Non-productive, infrequent cough. BP (!) 146/93 (BP Location: Right arm)  Pulse 80  Temp 98.5  F (36.9  C) (Oral)  Resp 20  SpO2 97%. Will continue to monitor.

## 2018-08-30 NOTE — PLAN OF CARE
Problem: Patient Care Overview  Goal: Plan of Care/Patient Progress Review  [facility] DISCHARGE NOTE    Patient discharged to home at 3:05 PM via ambulation. Accompanied by spouse and staff. Discharge instructions reviewed with patient and spouse, opportunity offered to ask questions. Prescriptions sent to patients preferred pharmacy. All belongings sent with patient.    Cyn La

## 2018-08-30 NOTE — DISCHARGE SUMMARY
"[facility] Discharge Summary    Ayana Prasad MRN# [id number]   Age: 28 year old YOB: 1990     Date of Admission:  8/28/2018  Date of Discharge::  8/30/2018  Admitting Physician:  Rafael Davis MD  Discharge Physician:  Rafael Davis MD  Primary Physician: Becki Avery  Transferring Facility: N/A     Home clinic: unknown          Admission Diagnoses:   Renal Colic  Nephrolithiasis  left ureteral stone          Discharge Diagnosis:   Principle diagnosis: Urolithiasis with hydronephrosis  Secondary diagnoses:  Active Problems:    Urolithiasis    ADHD (attention deficit hyperactivity disorder)    Bipolar 1 disorder, manic, mild    Marijuana abuse    Polysubstance abuse    GERD (gastroesophageal reflux disease)    Tobacco abuse    Cauda equina syndrome with neurogenic bladder (H)    Schizoaffective disorder, bipolar type (H)    PTSD (post-traumatic stress disorder)    Anxiety    Nephrolithiasis         Brief History of Presenting Illness:   As per admit hx  Ayana Prasad is a 28 year old female with the above past medical history now presents on 8/28/2018 with left flank pain. She has a history of kidney stones in the past, at times requiring urologic intervention.     Pain started abruptly around 10 am today. Pain is located in left flank and is constant in nature but worse with movement. Is described as a sharp pain without radiation, rated 8/10. Pain is better with pain medications, worse with movement.      She has been vomiting due to the pain, about 10 times since onset of pain. Non-bloody emesis.     Denies dysuria or urinary symptoms. However, she did notice her urine was red yesterday but no associated pain.     She mentons recently having \"the flu\" with a cough and fevers. Her last known fever was yesterday measuring 101.2. She states her cough has been gradually improving and is now much better than it was. No wheezing or shortness of breath.       Recent Results (from the past " 24 hour(s))   XR Surgery CANDIE Fluoro L/T 5 Min w Stills    Narrative    This exam was marked as non-reportable because it will not be read by a   radiologist or a [place] non-radiologist provider.                      Hospital Course:   Urolithiasis with hydronephrosis  Admit CT abdomen & pelvis showing left hydronephrosis with 4 mm stone at ureteropelvic junction, in addition to other bilateral nephrolithiasis. Admit UA showing hematuria and RBC > 182, but no pyuria or findings of infection. Afebrile, no leukocytosis.  Continued to have pain. Urology seen. S/p L ureteral stenting today. To f/u urology in 1 month.  -will discharge on norco, flomax, oxybutynin prn      Cough  Resolved    ADHD (attention deficit hyperactivity disorder)  Bipolar 1 disorder, manic, mild  Schizoaffective disorder, bipolar type (H)  PTSD (post-traumatic stress disorder)  Anxiety  Continue meds. F/u OP.          Procedures:   L ureteral stenting         Allergies:      Allergies   Allergen Reactions     Adhesive Tape Hives     Prednisone Other (See Comments) and Hives     Suicidal ideation     Droperidol Anxiety             Medications Prior to Admission:     Prescriptions Prior to Admission   Medication Sig Dispense Refill Last Dose     acetaminophen (TYLENOL) 325 MG tablet Take 325-650 mg by mouth every 6 hours as needed for fever   8/27/2018 at noon     gabapentin (NEURONTIN) 100 MG capsule Take 300 mg by mouth 3 times daily Dose per pt: 300mg tid. Per [facility]: last filled 200mg tid on March 30, 2018.   8/28/2018 at am     tretinoin (RETIN-A) 0.05 % cream Apply topically At Bedtime   8/27/2018 at 2200             Discharge Medications:     Current Discharge Medication List      START taking these medications    Details   HYDROcodone-acetaminophen (NORCO) 5-325 MG per tablet Take 1-2 tablets by mouth every 4 hours as needed for other (pain control or improvement in physical function.)  Qty: 20 tablet, Refills: 0     Associated Diagnoses: Calculus of kidney with calculus of ureter      oxybutynin (DITROPAN) 5 MG tablet Take 1 tablet (5 mg) by mouth 2 times daily as needed for bladder spasms  Qty: 360 tablet, Refills: 0    Associated Diagnoses: Calculus of kidney with calculus of ureter      tamsulosin (FLOMAX) 0.4 MG capsule Take 1 capsule (0.4 mg) by mouth daily  Qty: 60 capsule, Refills: 0    Associated Diagnoses: Calculus of kidney with calculus of ureter         CONTINUE these medications which have NOT CHANGED    Details   acetaminophen (TYLENOL) 325 MG tablet Take 325-650 mg by mouth every 6 hours as needed for fever      gabapentin (NEURONTIN) 100 MG capsule Take 300 mg by mouth 3 times daily Dose per pt: 300mg tid. Per [facility]: last filled 200mg tid on March 30, 2018.      tretinoin (RETIN-A) 0.05 % cream Apply topically At Bedtime                   Consultations:   Consultation during this admission received from urology            Discharge Exam:   Blood pressure 126/79, pulse 90, temperature 97.9  F (36.6  C), temperature source Oral, resp. rate 14, SpO2 96 %, not currently breastfeeding.  GENERAL APPEARANCE: healthy, alert and no distress  EYES: conjunctiva clear, eyes grossly normal  HENT: external ears and nose normal   NECK: supple, no masses or adenopathy  RESP: lungs clear to auscultation - no rales, rhonchi or wheezes  CV: regular rate and rhythm, normal S1 S2, no S3 or S4 and no murmur, click or rub   ABDOMEN: soft, nontender, no HSM or masses and bowel sounds normal  MS: no clubbing, cyanosis; no edema  SKIN: clear without significant rashes or lesions  NEURO: Normal strength and tone, sensory exam grossly normal, mentation intact and speech normal    Unresulted Labs Ordered in the Past 30 Days of this Admission     No orders found from 6/29/2018 to 8/29/2018.          Recent Results (from the past 24 hour(s))   XR Surgery CANDIE Fluoro L/T 5 Min w Stills    Narrative    This exam was marked as  non-reportable because it will not be read by a   radiologist or a [place] non-radiologist provider.                      Pending Tests at Discharge:   None         Discharge Instructions and Follow-Up:   Discharge diet: Regular   Discharge activity: Activity as tolerated   Discharge follow-up: F/u urology in 1 month           Discharge Disposition:   Discharged to home      Attestation:  I have reviewed today's vital signs, notes, medications, labs and imaging.    Time Spent on this Encounter   I, Rafael Davis, personally saw the patient today and spent less than or equal to 30 minutes discharging this patient.    Rafael Davis MD

## 2018-08-30 NOTE — BRIEF OP NOTE
Pre-op Dx: left ureteral stone  Post-op Dx:same  Procedure:cystoscopy, left stent placement  EBL:none  Specimens:none  Drains: left 6 x 26 JJ stent  No complications.

## 2018-08-30 NOTE — PROGRESS NOTES
Patient reporting new chest pain, mid sternal with associated shortness of breath.  /93, oxygen saturations stable on room air.  Respiratory rate 18-20.  Patient also complaining of left flank pain, rating at 8/10.  Medicated with dilaudid IVP per order.  EKG ordered to assess chest pain, PA notified of new chest pain at this time.

## 2018-08-30 NOTE — ANESTHESIA POSTPROCEDURE EVALUATION
Patient: Ayana Prasad    Procedure(s):  Cystoscopy With Left Stent Placement - Wound Class: II-Clean Contaminated    Diagnosis:left ureteral stone  Diagnosis Additional Information: No value filed.    Anesthesia Type:  General, LMA    Note:  Anesthesia Post Evaluation    Patient location during evaluation: Bedside  Patient participation: Able to fully participate in evaluation  Level of consciousness: awake and alert  Pain management: adequate  Airway patency: patent  Cardiovascular status: acceptable  Respiratory status: acceptable  Hydration status: acceptable  PONV: none     Anesthetic complications: None          Last vitals:  Vitals:    08/30/18 1230 08/30/18 1302 08/30/18 1336   BP: 125/81  126/79   Pulse:      Resp: 16 16 14   Temp:   36.6  C (97.9  F)   SpO2: 95% 97% 96%         Electronically Signed By: BROOKLYN Joyner CRNA  August 30, 2018  2:44 PM

## 2018-09-04 ENCOUNTER — MYC MEDICAL ADVICE (OUTPATIENT)
Dept: FAMILY MEDICINE | Facility: CLINIC | Age: 28
End: 2018-09-04

## 2018-09-04 DIAGNOSIS — N20.2 CALCULUS OF KIDNEY WITH CALCULUS OF URETER: ICD-10-CM

## 2018-09-04 RX ORDER — HYDROCODONE BITARTRATE AND ACETAMINOPHEN 5; 325 MG/1; MG/1
1-2 TABLET ORAL EVERY 4 HOURS PRN
Qty: 20 TABLET | Refills: 0 | Status: SHIPPED | OUTPATIENT
Start: 2018-09-04 | End: 2018-10-13

## 2018-09-05 ENCOUNTER — PATIENT OUTREACH (OUTPATIENT)
Dept: CARE COORDINATION | Facility: CLINIC | Age: 28
End: 2018-09-05

## 2018-09-05 ASSESSMENT — ACTIVITIES OF DAILY LIVING (ADL): DEPENDENT_IADLS:: INDEPENDENT

## 2018-09-05 NOTE — PROGRESS NOTES
Clinic Care Coordination Contact    Clinic Care Coordination Contact  OUTREACH    Referral Information:  Pt was admitted to [facility] from  to  with Renal Colic, Nephrolithiasis, and left ureteral stone.    Referral Source: ED Follow-Up  Primary Diagnosis: Dual -  MH/CD    SW placed call to pt.  Introduced self, title and role.  Pt thanked writer for calling her and checking in on her.  Pt shared with writer that she is clean, sober and is very happy.  Pt has been living this summer up in [place], [place] as a  and has loved this job; hopes to go back next year, if she can.  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  Pt says that life has certainly changed for the better for her & she appreciates the little things in life more.    SW and pt discussed pt's current state of health.  Pt states she feels much better, but may need another surgery down the road.  Pt then shared that her insurance  on 18 and she is working with her [facility] financial worker on getting it reinstated.  Pt said that she sent in all of the paperwork to add her spouse and children to her MA account, but paperwork must be redone.  Pt plans on working on this project this week.    Chief Complaint   Patient presents with     Clinic Care Coordination - Post Hospital     Hospital follow up--social work        Universal Utilization: Clinic Utilization  Difficulty keeping appointments:: Yes  Compliance Concerns: Yes  No-Show Concerns: Yes  No PCP office visit in Past Year: No  Utilization    Last refreshed: 2018  3:05 PM:  No Show Count (past year) 4       Last refreshed: 2018  3:05 PM:  ED visits 2       Last refreshed: 2018  3:05 PM:  Hospital admissions 1          Current as of: 2018  3:05 PM           Clinical Concerns:  Current Medical Concerns:  None currently, per pt.    Current Behavioral Concerns: Pt states that her mental health  feels like it is the most stable it has ever been.    Education Provided to patient: SW and pt discussed pt's medical status and behavioral health status.  Pt is aware of how to engage with professional help for both, if needed     Pain  Pain (GOAL):: No  Health Maintenance Reviewed: Due/Overdue   HIV SCREEN (SYSTEM ASSIGNED)  8/6/2008       PAP SCREENING Q3 YR (SYSTEM ASSIGNED)  1/13/2015  10/1/2017     TOBACCO CESSATION COUNSELING Q1 YR  6/1/2016       INFLUENZA VACCINE (1)  9/1/2018       Clinical Pathway: None    Medication Management:  Pt states that she takes her medications and is typically able to obtain them with ease with her MA     Functional Status:  Dependent ADLs:: Independent  Dependent IADLs:: Independent  Bed or wheelchair confined:: No  Mobility Status: Independent  Fallen 2 or more times in the past year?: No  Any fall with injury in the past year?: No    Living Situation:  Current living arrangement:: I live in a private home with spouse, I live in a private home with family (Wife and stepchildren, ages 14, 11 and 4 years)  Type of residence:: Apartment    Diet/Exercise/Sleep:  Diet:: Regular  Inadequate nutrition (GOAL):: No  Food Insecurity: No  Tube Feeding: No  Exercise:: Yes  Days per week of moderate to strenuous exercise (like a brisk walk): 3  On average, minutes per day of exercise at this level: 30  How intense was your typical exercise? : Moderate (like brisk walking)  Exercise Minutes per Week: 90  Inadequate activity/exercise (GOAL):: No  Significant changes in sleep pattern (GOAL): No    Transportation:  Transportation concerns (GOAL):: No  Transportation means:: Regular car, Medical transport, Family, Friend     Psychosocial:  Samaritan or spiritual beliefs that impact treatment:: Yes  Mental health DX:: Yes  Mental health management concern (GOAL):: Yes  Informal Support system:: Children, Family, [place] based, Spouse, Friends     Financial/Insurance:  SSDI, wages and MA (is in the  process of reapplication)  Financial/Insurance concerns (GOAL):: No     Resources and Interventions:  Current Resources:  Mental Health provider, family, friends   Community Resources: [facility]  Supplies used at home:: None  Equipment Currently Used at Home: none    Advance Care Plan/Directive  Advanced Care Plans/Directives on file:: No    Referrals Placed: None     Goals:   Goals        General    Mental Health Management (pt-stated)     Notes - Note edited  9/5/2018  3:39 PM by Nancy Anthony LSW    Goal Statement: I would like to get established with a long term Psychiatry provider.   Measure of Success: Pt will establish cares and keep appointments with a Psych provider  Supportive Steps to Achieve: [facility] and  [facility] will assist pt with obtaining appointment availability for Psych providers in her area.  Barriers: Pt has hx of not following with care providers for long term care needs  Strengths: Pt appears very motivated to work on her behavioral health issues at this time.  Date to Achieve By: 1 year              Patient/Caregiver understanding: Pt was given the opportunity to close to care coordination, however, pt chose to remain engaged with this writer.      Outreach Frequency: monthly  Future Appointments              In 4 weeks Kiran Ulrich MD [facility]          Plan: SW to continue to follow for [facility] needs and supportive services.    Nancy Jaramillo  Social Work Care Coordinator  [facility]  [phone]

## 2018-10-13 ENCOUNTER — APPOINTMENT (OUTPATIENT)
Dept: CT IMAGING | Facility: CLINIC | Age: 28
End: 2018-10-13
Attending: EMERGENCY MEDICINE
Payer: MEDICAID

## 2018-10-13 ENCOUNTER — HOSPITAL ENCOUNTER (OUTPATIENT)
Facility: CLINIC | Age: 28
Setting detail: OBSERVATION
Discharge: HOME | End: 2018-10-14
Attending: EMERGENCY MEDICINE | Admitting: UROLOGY
Payer: MEDICAID

## 2018-10-13 DIAGNOSIS — R10.9 LEFT FLANK PAIN: ICD-10-CM

## 2018-10-13 DIAGNOSIS — N39.0 URINARY TRACT INFECTION WITH HEMATURIA, SITE UNSPECIFIED: ICD-10-CM

## 2018-10-13 DIAGNOSIS — R31.9 URINARY TRACT INFECTION WITH HEMATURIA, SITE UNSPECIFIED: ICD-10-CM

## 2018-10-13 DIAGNOSIS — R10.9 FLANK PAIN: Primary | ICD-10-CM

## 2018-10-13 LAB
ALBUMIN UR-MCNC: 100 MG/DL
ANION GAP SERPL CALCULATED.3IONS-SCNC: 10 MMOL/L (ref 3–14)
APPEARANCE UR: ABNORMAL
B-HCG FREE SERPL-ACNC: <5 IU/L
BACTERIA #/AREA URNS HPF: ABNORMAL /HPF
BASOPHILS # BLD AUTO: 0.1 10E9/L (ref 0–0.2)
BASOPHILS NFR BLD AUTO: 0.7 %
BILIRUB UR QL STRIP: NEGATIVE
BUN SERPL-MCNC: 14 MG/DL (ref 7–30)
CALCIUM SERPL-MCNC: 9.3 MG/DL (ref 8.5–10.1)
CHLORIDE SERPL-SCNC: 110 MMOL/L (ref 94–109)
CO2 BLDCOV-SCNC: 21 MMOL/L (ref 21–28)
CO2 SERPL-SCNC: 20 MMOL/L (ref 20–32)
COLOR UR AUTO: YELLOW
CREAT SERPL-MCNC: 0.85 MG/DL (ref 0.52–1.04)
DIFFERENTIAL METHOD BLD: NORMAL
EOSINOPHIL # BLD AUTO: 0.1 10E9/L (ref 0–0.7)
EOSINOPHIL NFR BLD AUTO: 1.1 %
ERYTHROCYTE [DISTWIDTH] IN BLOOD BY AUTOMATED COUNT: 13.7 % (ref 10–15)
GFR SERPL CREATININE-BSD FRML MDRD: 79 ML/MIN/1.7M2
GLUCOSE SERPL-MCNC: 88 MG/DL (ref 70–99)
GLUCOSE UR STRIP-MCNC: NEGATIVE MG/DL
HCT VFR BLD AUTO: 43.4 % (ref 35–47)
HGB BLD-MCNC: 14.5 G/DL (ref 11.7–15.7)
HGB UR QL STRIP: ABNORMAL
IMM GRANULOCYTES # BLD: 0 10E9/L (ref 0–0.4)
IMM GRANULOCYTES NFR BLD: 0.3 %
KETONES UR STRIP-MCNC: NEGATIVE MG/DL
LACTATE BLD-SCNC: 1 MMOL/L (ref 0.7–2.1)
LEUKOCYTE ESTERASE UR QL STRIP: ABNORMAL
LYMPHOCYTES # BLD AUTO: 2.9 10E9/L (ref 0.8–5.3)
LYMPHOCYTES NFR BLD AUTO: 27.6 %
MCH RBC QN AUTO: 28.3 PG (ref 26.5–33)
MCHC RBC AUTO-ENTMCNC: 33.4 G/DL (ref 31.5–36.5)
MCV RBC AUTO: 85 FL (ref 78–100)
MONOCYTES # BLD AUTO: 0.5 10E9/L (ref 0–1.3)
MONOCYTES NFR BLD AUTO: 4.6 %
MUCOUS THREADS #/AREA URNS LPF: PRESENT /LPF
NEUTROPHILS # BLD AUTO: 6.8 10E9/L (ref 1.6–8.3)
NEUTROPHILS NFR BLD AUTO: 65.7 %
NITRATE UR QL: NEGATIVE
NRBC # BLD AUTO: 0 10*3/UL
NRBC BLD AUTO-RTO: 0 /100
PCO2 BLDV: 27 MM HG (ref 40–50)
PH BLDV: 7.5 PH (ref 7.32–7.43)
PH UR STRIP: 5 PH (ref 5–7)
PLATELET # BLD AUTO: 331 10E9/L (ref 150–450)
PO2 BLDV: 56 MM HG (ref 25–47)
POTASSIUM SERPL-SCNC: 3.9 MMOL/L (ref 3.4–5.3)
RBC # BLD AUTO: 5.12 10E12/L (ref 3.8–5.2)
RBC #/AREA URNS AUTO: >182 /HPF (ref 0–2)
SAO2 % BLDV FROM PO2: 92 %
SODIUM SERPL-SCNC: 140 MMOL/L (ref 133–144)
SOURCE: ABNORMAL
SP GR UR STRIP: 1.02 (ref 1–1.03)
SQUAMOUS #/AREA URNS AUTO: 2 /HPF (ref 0–1)
UROBILINOGEN UR STRIP-MCNC: 0 MG/DL (ref 0–2)
WBC # BLD AUTO: 10.3 10E9/L (ref 4–11)
WBC #/AREA URNS AUTO: 31 /HPF (ref 0–5)

## 2018-10-13 PROCEDURE — 96361 HYDRATE IV INFUSION ADD-ON: CPT

## 2018-10-13 PROCEDURE — 74176 CT ABD & PELVIS W/O CONTRAST: CPT

## 2018-10-13 PROCEDURE — 96366 THER/PROPH/DIAG IV INF ADDON: CPT

## 2018-10-13 PROCEDURE — 96365 THER/PROPH/DIAG IV INF INIT: CPT

## 2018-10-13 PROCEDURE — G0378 HOSPITAL OBSERVATION PER HR: HCPCS

## 2018-10-13 PROCEDURE — 99285 EMERGENCY DEPT VISIT HI MDM: CPT | Mod: 25

## 2018-10-13 PROCEDURE — 96375 TX/PRO/DX INJ NEW DRUG ADDON: CPT

## 2018-10-13 PROCEDURE — 83605 ASSAY OF LACTIC ACID: CPT

## 2018-10-13 PROCEDURE — 81001 URINALYSIS AUTO W/SCOPE: CPT | Performed by: EMERGENCY MEDICINE

## 2018-10-13 PROCEDURE — 25000128 H RX IP 250 OP 636: Performed by: INTERNAL MEDICINE

## 2018-10-13 PROCEDURE — 99220 ZZC INITIAL OBSERVATION CARE,LEVL III: CPT | Performed by: INTERNAL MEDICINE

## 2018-10-13 PROCEDURE — 84702 CHORIONIC GONADOTROPIN TEST: CPT

## 2018-10-13 PROCEDURE — 25000132 ZZH RX MED GY IP 250 OP 250 PS 637: Performed by: INTERNAL MEDICINE

## 2018-10-13 PROCEDURE — 87086 URINE CULTURE/COLONY COUNT: CPT | Performed by: EMERGENCY MEDICINE

## 2018-10-13 PROCEDURE — 82803 BLOOD GASES ANY COMBINATION: CPT

## 2018-10-13 PROCEDURE — 85025 COMPLETE CBC W/AUTO DIFF WBC: CPT | Performed by: EMERGENCY MEDICINE

## 2018-10-13 PROCEDURE — 25000128 H RX IP 250 OP 636: Performed by: EMERGENCY MEDICINE

## 2018-10-13 PROCEDURE — 96376 TX/PRO/DX INJ SAME DRUG ADON: CPT

## 2018-10-13 PROCEDURE — 80048 BASIC METABOLIC PNL TOTAL CA: CPT | Performed by: EMERGENCY MEDICINE

## 2018-10-13 RX ORDER — ONDANSETRON 2 MG/ML
4 INJECTION INTRAMUSCULAR; INTRAVENOUS EVERY 6 HOURS PRN
Status: DISCONTINUED | OUTPATIENT
Start: 2018-10-13 | End: 2018-10-14 | Stop reason: HOSPADM

## 2018-10-13 RX ORDER — IBUPROFEN 800 MG/1
800 TABLET, FILM COATED ORAL 4 TIMES DAILY
Status: ON HOLD | COMMUNITY
End: 2018-10-22

## 2018-10-13 RX ORDER — HYDROMORPHONE HYDROCHLORIDE 1 MG/ML
0.5 INJECTION, SOLUTION INTRAMUSCULAR; INTRAVENOUS; SUBCUTANEOUS
Status: DISCONTINUED | OUTPATIENT
Start: 2018-10-13 | End: 2018-10-13

## 2018-10-13 RX ORDER — ONDANSETRON 4 MG/1
4 TABLET, ORALLY DISINTEGRATING ORAL EVERY 6 HOURS PRN
Status: DISCONTINUED | OUTPATIENT
Start: 2018-10-13 | End: 2018-10-14 | Stop reason: HOSPADM

## 2018-10-13 RX ORDER — AMOXICILLIN 250 MG
1 CAPSULE ORAL 2 TIMES DAILY
Status: DISCONTINUED | OUTPATIENT
Start: 2018-10-13 | End: 2018-10-14 | Stop reason: HOSPADM

## 2018-10-13 RX ORDER — NALOXONE HYDROCHLORIDE 0.4 MG/ML
.1-.4 INJECTION, SOLUTION INTRAMUSCULAR; INTRAVENOUS; SUBCUTANEOUS
Status: DISCONTINUED | OUTPATIENT
Start: 2018-10-13 | End: 2018-10-14 | Stop reason: HOSPADM

## 2018-10-13 RX ORDER — KETOROLAC TROMETHAMINE 15 MG/ML
15 INJECTION, SOLUTION INTRAMUSCULAR; INTRAVENOUS ONCE
Status: COMPLETED | OUTPATIENT
Start: 2018-10-13 | End: 2018-10-13

## 2018-10-13 RX ORDER — HYDROMORPHONE HYDROCHLORIDE 1 MG/ML
.3-.5 INJECTION, SOLUTION INTRAMUSCULAR; INTRAVENOUS; SUBCUTANEOUS
Status: DISCONTINUED | OUTPATIENT
Start: 2018-10-13 | End: 2018-10-14 | Stop reason: HOSPADM

## 2018-10-13 RX ORDER — CEFTRIAXONE 1 G/1
1 INJECTION, POWDER, FOR SOLUTION INTRAMUSCULAR; INTRAVENOUS ONCE
Status: COMPLETED | OUTPATIENT
Start: 2018-10-13 | End: 2018-10-13

## 2018-10-13 RX ORDER — OXYCODONE HYDROCHLORIDE 5 MG/1
5-10 TABLET ORAL
Status: DISCONTINUED | OUTPATIENT
Start: 2018-10-13 | End: 2018-10-14 | Stop reason: HOSPADM

## 2018-10-13 RX ORDER — POLYETHYLENE GLYCOL 3350 17 G/17G
17 POWDER, FOR SOLUTION ORAL DAILY PRN
Status: DISCONTINUED | OUTPATIENT
Start: 2018-10-13 | End: 2018-10-14 | Stop reason: HOSPADM

## 2018-10-13 RX ORDER — SODIUM CHLORIDE 9 MG/ML
INJECTION, SOLUTION INTRAVENOUS CONTINUOUS
Status: DISCONTINUED | OUTPATIENT
Start: 2018-10-13 | End: 2018-10-14 | Stop reason: HOSPADM

## 2018-10-13 RX ORDER — CEFTRIAXONE 2 G/1
2 INJECTION, POWDER, FOR SOLUTION INTRAMUSCULAR; INTRAVENOUS EVERY 24 HOURS
Status: DISCONTINUED | OUTPATIENT
Start: 2018-10-14 | End: 2018-10-14 | Stop reason: HOSPADM

## 2018-10-13 RX ORDER — ACETAMINOPHEN 650 MG/1
650 SUPPOSITORY RECTAL EVERY 4 HOURS PRN
Status: DISCONTINUED | OUTPATIENT
Start: 2018-10-13 | End: 2018-10-14 | Stop reason: HOSPADM

## 2018-10-13 RX ORDER — AMOXICILLIN 250 MG
2 CAPSULE ORAL 2 TIMES DAILY
Status: DISCONTINUED | OUTPATIENT
Start: 2018-10-13 | End: 2018-10-14 | Stop reason: HOSPADM

## 2018-10-13 RX ORDER — ACETAMINOPHEN 325 MG/1
650 TABLET ORAL EVERY 4 HOURS PRN
Status: DISCONTINUED | OUTPATIENT
Start: 2018-10-13 | End: 2018-10-14 | Stop reason: HOSPADM

## 2018-10-13 RX ORDER — TAMSULOSIN HYDROCHLORIDE 0.4 MG/1
0.4 CAPSULE ORAL DAILY
Status: DISCONTINUED | OUTPATIENT
Start: 2018-10-13 | End: 2018-10-14 | Stop reason: HOSPADM

## 2018-10-13 RX ADMIN — TAMSULOSIN HYDROCHLORIDE 0.4 MG: 0.4 CAPSULE ORAL at 23:57

## 2018-10-13 RX ADMIN — Medication 0.5 MG: at 17:01

## 2018-10-13 RX ADMIN — SODIUM CHLORIDE 1000 ML: 9 INJECTION, SOLUTION INTRAVENOUS at 17:43

## 2018-10-13 RX ADMIN — KETOROLAC TROMETHAMINE 15 MG: 15 INJECTION, SOLUTION INTRAMUSCULAR; INTRAVENOUS at 17:01

## 2018-10-13 RX ADMIN — ONDANSETRON 4 MG: 2 INJECTION INTRAMUSCULAR; INTRAVENOUS at 22:26

## 2018-10-13 RX ADMIN — Medication 0.5 MG: at 21:16

## 2018-10-13 RX ADMIN — SENNOSIDES AND DOCUSATE SODIUM 1 TABLET: 8.6; 5 TABLET ORAL at 21:16

## 2018-10-13 RX ADMIN — SODIUM CHLORIDE: 9 INJECTION, SOLUTION INTRAVENOUS at 20:53

## 2018-10-13 RX ADMIN — CEFTRIAXONE SODIUM 1 G: 1 INJECTION, POWDER, FOR SOLUTION INTRAMUSCULAR; INTRAVENOUS at 18:33

## 2018-10-13 RX ADMIN — Medication 0.5 MG: at 18:35

## 2018-10-13 ASSESSMENT — ENCOUNTER SYMPTOMS
DIARRHEA: 0
FLANK PAIN: 1
FEVER: 1
HEMATURIA: 1
COUGH: 0
VOMITING: 0
DYSURIA: 1
NAUSEA: 0

## 2018-10-13 ASSESSMENT — PAIN DESCRIPTION - DESCRIPTORS
DESCRIPTORS: CONSTANT;SHARP
DESCRIPTORS: ACHING;CONSTANT

## 2018-10-13 NOTE — IP AVS SNAPSHOT
[facility] PreOP/PostOP    [address]    [facility] [address]    Phone:  [phone]    Fax:  [phone]                                       After Visit Summary   10/13/2018    Ayana Prasad    MRN: [id number]           After Visit Summary Signature Page     I have received my discharge instructions, and my questions have been answered. I have discussed any challenges I see with this plan with the nurse or doctor.    ..........................................................................................................................................  Patient/Patient Representative Signature      ..........................................................................................................................................  Patient Representative Print Name and Relationship to Patient    ..................................................               ................................................  Date                                   Time    ..........................................................................................................................................  Reviewed by Signature/Title    ...................................................              ..............................................  Date                                               Time          22EPIC Rev 08/18

## 2018-10-13 NOTE — ED PROVIDER NOTES
History     Chief Complaint:  Flank Pain    HPI   Ayana Prasad is a 28 year old female with a history of left sided kidney stones, left ureteral stent insertion who presents with left flank pain. The patient reports that for the past 2 days she has been having left sided flank pain that has been increasing in severity. She states that initially the pain was only present while urinating, but has since increased in severity and become constant. She states that this pain is not similar to when she was passing kidney stones in the past. She has been taking Advil with no improvement. The patient also reports having blood in her urine and a fever for the past 2 days. Her fever measured 102 F at home 2 days ago. Since then her fever has been decreasing and she reports no fevers today. The patient denies any cough, vomiting or diarrhea.     Allergies:  Adhesive tape  Prednisone  Droperidol     Medications:    Flomax  Retin-A    Past Medical History:    ADHD  Bipolar 1 disorder, manic, mild  Cauda equina syndrome  Chemical injury to cornea of left eye  Depressive disorder  GERD  Marginal corneal ulcer, left  Nephrolithiasis  Polysubstance abuse  Urolithiasis  Intractable back pain  Insomnia  Optic neuritis  PTSD  Anxiety    Past Surgical History:    Back surgery for Cauda Equina syndrome  Combined Cystoscopy, insert stent ureter  Combined cytoscopy, retrogrades, ureteroscopy, insert stent  Cystoscopy, ureteroscopy  ENT Surgery  EGD Combined X2  Removal of kidney stones surgery  Cholecystectomy  Laser Holmium Lithotripsy ureter, insert stent, combined  Transurethral stone resection    Family History:    Gastrointestinal disease  Liver cancer  Esophageal cancer  Lymphoma  Prostate cancer  Diabetes  Hypertension  Heart disease  Hyperlipidemia  Mental illness    Social History:  Smoking Status: Former Smoker, Current E-cig user  Smokeless tobacco: Chew  Alcohol Use: No  Patient presents with wife.  Marital Status:        Review of Systems   Constitutional: Positive for fever.   Respiratory: Negative for cough.    Cardiovascular: Negative for chest pain.   Gastrointestinal: Negative for diarrhea, nausea and vomiting.   Genitourinary: Positive for dysuria, flank pain and hematuria.   All other systems reviewed and are negative.      Physical Exam     Patient Vitals for the past 24 hrs:   BP Temp Temp src Heart Rate Resp SpO2 Weight   10/13/18 1930 - - - - - 95 % -   10/13/18 1915 - - - - - 98 % -   10/13/18 1900 - - - - - 94 % -   10/13/18 1845 - - - - - 98 % -   10/13/18 1838 - - - - - 98 % -   10/13/18 1715 - - - - - 96 % -   10/13/18 1708 - - - - - 97 % -   10/13/18 1624 (!) 160/107 97.9  F (36.6  C) Temporal 93 18 97 % 87.4 kg (192 lb 10.9 oz)       Physical Exam  VS: Reviewed per above  HENT: Mucous membranes moist  EYES: sclera anicteric  CV: Rate as noted, regular rhythm.   RESP: Effort normal. Breath sounds are normal bilaterally.  GI: no tenderness, not distended.  Left CVA tenderness.  NEURO: Alert, moving all extremities  MSK: No deformity of the extremities  SKIN: Warm and dry      Emergency Department Course     Imaging:  Radiographic findings were communicated with the patient and family who voiced understanding of the findings.    Abd/pelvis CT no contrast  1. Moderate left hydronephrosis has increased since the prior exam.  Left ureter stent is in place. No visible left ureter stone is  identified.  2. Bilateral intrarenal stones again noted.  3. Fatty liver.    As read by Radiology.    Laboratory:  ISTAT HCG Quantitative Pregnancy POCT: HCG < 5.0    1831 - ISTAT gases Lactate denise POCT: pH 7.50 (H), pCO2 27 (L), pO2 56 (H), Bicarbonate 21, Lactic Acid 1.0    CBC: WNL (WBC 10.3, HGB 14.5, )    BMP: Chloride 110 (H) o/w WNL (Creatinine 0.85)    UA: Urine BLOO Large (A), Protein Albumin Urine 100 (A), Leukocyte Esterase Urine Large (A), WBC/HPF 31 (H), RBC/HPF >182 (H), Bacteria Few (A), Squamous Epithelial/HPF  Urine 2 (H), Mucous Urine Present (A), o/w Negative    Urine Culture: Pending    Interventions:    1701: PF Dilaudid 0.5 mg IV  1701: Toradol 15 mg IV  1743: NS 1L IV Bolus  1833: Rocephin 1 g IV    Emergency Department Course:  Past medical records, nursing notes, and vitals reviewed.  1629: I performed an exam of the patient and obtained history, as documented above.    The patient was sent for a CT scan while in the emergency department, findings above.    IV inserted and blood drawn.    1757: I discussed the case on the phone with Dr. Guerrero of Urology regarding the patient.    1805: I rechecked the patient. Explained findings to patient and wife.    1847: I rechecked the patient. Explained findings to patient and wife.    1914: Rechecked the patient, findings and plan explained to the patient, who consents to admission. Discussed the patient with Dr. Carbajal, who will admit the patient to an observation bed for further observation, evaluation, and treatment.    Impression & Plan      Medical Decision Making:    Patient presents to the ER with dysuria and hematuria and left flank pain.  Initial vital signs are notable for lack of fever.  She has left CVA tenderness.  She has a history of left ureteral stent that was supposed to be removed last month but patient did not get around to it.  Given location of pain and her history, CT of the abdomen was obtained.  It was notable for mild worsening left-sided hydronephrosis but no new obstructing stones.  Ureteral stent was still in place.  Urinalysis revealed large amount of RBCs with some pyuria and leukocyte esterase.  It was nitrite negative.  Given equivocal nature of urine sample and symptoms, IV Rocephin was given.  I discussed the case with on-call urologist who agreed with empiric antibiotics and recommended that her stent be removed within the next few days.  Unfortunately the patient's symptoms were not adequately controlled while in the ER and so she was  admitted for further symptom control.  Lactate was within normal limits and there is no evidence of fever or sepsis.  Patient remained hemodynamically stable under my care.    Diagnosis:    ICD-10-CM   1. Left flank pain R10.9   2. Urinary tract infection with hematuria, site unspecified N39.0    R31.9       Disposition:  Admitted to Observation.    Daly Marquez  10/13/2018   [facility] EMERGENCY DEPARTMENT  I, Daly Marquez, am serving as a scribe at 4:29 PM on 10/13/2018 to document services personally performed by Jus Maria MD based on my observations and the provider's statements to me.        Jus Maria MD  10/14/18 0114

## 2018-10-13 NOTE — ED TRIAGE NOTES
Pt arrives with L flank pain that started Thursday and has been progressively getting worse. Pt has hx of kidney stones, has stent in L ureter. Pt also reports intermittent fevers and hematuria. ABCs intact.

## 2018-10-13 NOTE — IP AVS SNAPSHOT
MRN:[id number]                      After Visit Summary   10/13/2018    Ayana Prasad    MRN: [id number]           Thank you!     Thank you for choosing [facility] for your care. Our goal is always to provide you with excellent care. Hearing back from our patients is one way we can continue to improve our services. Please take a few minutes to complete the written survey that you may receive in the mail after you visit. If you would like to speak to someone directly about your visit please contact Patient Relations at [phone]. Thank you!          Patient Information     Date Of Birth          1990        About your hospital stay     You were admitted on:  October 13, 2018 You last received care in the:  [facility] PreOP/PostOP    You were discharged on:  October 14, 2018        Reason for your hospital stay       You were admitted for concerns of abdominal pain related to a previous ureteral stent placed in 08/2018. You were treated with fluids, flomax and pain/nausea control as needed. You were seen by our urologists who opted to take you to surgery for evaluation of stent and/or removal. Your urine did look infected so antibiotics were prescribed and you will discharge on a course of oral Keflex. We will follow your urine culture and call you if changes need to be made.     We recommend you follow urology's instructions for diet and activity level.    If you need to get a hold of [facility] for follow up please call [phone]                  Who to Call     For medical emergencies, please call [phone].  For non-urgent questions about your medical care, please call your primary care provider or clinic, [phone]  For questions related to your surgery, please call your surgery clinic        Attending Provider     Provider Specialty    Jus Maria MD Emergency Medicine    Ata Carbajal MD Internal Medicine       Primary Care Provider Office Phone #  Fax #    Becki Stacie WelchelerBROOKLYN [phone]      After Care Instructions     Activity       Your activity upon discharge: activity as tolerated            Diet       Follow this diet upon discharge: advance diet as tolerated after procedure                  Follow-up Appointments     Follow-up and recommended labs and tests        Follow up with primary care provider, Becki Avery, within 7 days for hospital follow-up.  No follow up labs or test are needed.                  Your next 10 appointments already scheduled     Nov 15, 2018  1:00 PM CST   [facility] Return with Kiran Ulrich MD   [facility]    [address] [address] [phone]              Further instructions from your care team       CYSTOSCOPY DISCHARGE INSTRUCTIONS  [facility] / UROLOGY  [facility]  [phone]    YOU MAY GO BACK TO YOUR NORMAL DIET AND ACTIVITY, UNLESS YOUR DOCTOR TELLS YOU NOT TO.    FOR THE NEXT TWO DAYS, YOU MAY NOTICE:    SOME BLOOD IN YOUR URINE.  SOME BURNING WHEN YOU URINATE (USE THE TOILET).  AN URGE TO URINATE MORE OFTEN.  BLADDER SPASMS.    THESE ARE NORMAL AFTER THE PROCEDURE.  THEY SHOULD GO AWAY AFTER A DAY OR TWO.  TO RELIEVE THESE PROBLEMS:     DRINK 6 TO 8 LARGE GLASSES OF WATER EACH DAY (INCLUDES DRINKS AT MEALS).  THIS WILL HELP CLEAR THE URINE.    TAKE WARM BATHS TO RELIEVE PAIN AND BLADDER SPASMS.  DO NOT ADD ANYTHING TO THE BATH WATER.    YOUR DOCTOR MAY PRESCRIBE PAIN MEDICINE.  YOU MAY ALSO TAKE TYLENOL (ACETAMINOPHEN) FOR PAIN.    CALL YOUR SURGEON IF YOU HAVE:    A FEVER OVER 101 DEGREES.  CHECK YOUR TEMPERATURE UNDER YOUR TONGUE.    CHILLS.    FAILURE TO URINATE (NO URINE COMES OUT WHEN YOU TRY TO USE THE TOILET).  TRY SOAKING IN A BATHTUB FULL OF WARM WATER.  IF STILL NO URINE, CALL YOUR DOCTOR.    A LOT OF BLOOD IN THE URINE, OR BLOOD CLOTS LARGER THAN A NICKEL.       PAIN IN THE BACK OR BELLY AREA (ABDOMEN).    PAIN OR SPASMS THAT ARE NOT RELIEVED BY WARM TUB BATHS AND PAIN MEDICINE.      SEVERE PAIN, BURNING OR OTHER PROBLEMS WHILE PASSING URINE.    PAIN THAT GETS WORSE AFTER TWO DAYS.     GENERAL ANESTHESIA OR SEDATION ADULT DISCHARGE INSTRUCTIONS   SPECIAL PRECAUTIONS FOR 24 HOURS AFTER SURGERY    IT IS NOT UNUSUAL TO FEEL LIGHT-HEADED OR FAINT, UP TO 24 HOURS AFTER SURGERY OR WHILE TAKING PAIN MEDICATION.  IF YOU HAVE THESE SYMPTOMS; SIT FOR A FEW MINUTES BEFORE STANDING AND HAVE SOMEONE ASSIST YOU WHEN YOU GET UP TO WALK OR USE THE BATHROOM.    YOU SHOULD REST AND RELAX FOR THE NEXT 24 HOURS AND YOU MUST MAKE ARRANGEMENTS TO HAVE SOMEONE STAY WITH YOU FOR AT LEAST 24 HOURS AFTER YOUR DISCHARGE.  AVOID HAZARDOUS AND STRENUOUS ACTIVITIES.  DO NOT MAKE IMPORTANT DECISIONS FOR 24 HOURS.    DO NOT DRIVE ANY VEHICLE OR OPERATE MECHANICAL EQUIPMENT FOR 24 HOURS FOLLOWING THE END OF YOUR SURGERY.  EVEN THOUGH YOU MAY FEEL NORMAL, YOUR REACTIONS MAY BE AFFECTED BY THE MEDICATION YOU HAVE RECEIVED.    DO NOT DRINK ALCOHOLIC BEVERAGES FOR 24 HOURS FOLLOWING YOUR SURGERY.    DRINK CLEAR LIQUIDS (APPLE JUICE, GINGER ALE, 7-UP, BROTH, ETC.).  PROGRESS TO YOUR REGULAR DIET AS YOU FEEL ABLE.    YOU MAY HAVE A DRY MOUTH, A SORE THROAT, MUSCLES ACHES OR TROUBLE SLEEPING.  THESE SHOULD GO AWAY AFTER 24 HOURS.    CALL YOUR DOCTOR FOR ANY OF THE FOLLOWING:  SIGNS OF INFECTION (FEVER, GROWING TENDERNESS AT THE SURGERY SITE, A LARGE AMOUNT OF DRAINAGE OR BLEEDING, SEVERE PAIN, FOUL-SMELLING DRAINAGE, REDNESS OR SWELLING.    IT HAS BEEN OVER 8 TO 10 HOURS SINCE SURGERY AND YOU ARE STILL NOT ABLE TO URINATE (PASS WATER).             Pending Results     Date and Time Order Name Status Description    10/13/2018 1753 Urine Culture Preliminary             Statement of Approval     Ordered          10/14/18 1002  I have reviewed and agree with all the recommendations and orders detailed in this  document.  EFFECTIVE NOW     Approved and electronically signed by:  Little Duckworth PA-C             Admission Information     Date & Time Provider Department Dept. Phone    10/13/2018 Ata Carbajal MD [facility] PreOP/PostOP [phone]      Your Vitals Were     Blood Pressure Pulse Temperature Respirations Weight Pulse Oximetry    124/72 94 97.1  F (36.2  C) (Temporal) 16 87.1 kg (192 lb) 97%    BMI (Body Mass Index)                   30.53 kg/m2           MyChart Information     [email or web address] gives you secure access to your electronic health record. If you see a primary care provider, you can also send messages to your care team and make appointments. If you have questions, please call your primary care clinic.  If you do not have a primary care provider, please call [phone] and they will assist you.        Care EveryWhere ID     This is your Care EveryWhere ID. This could be used by other organizations to access your [place] medical records  [id number]        Equal Access to Services     PIPPA KOCH : Hadii dale ku hadasho Soomaali, waaxda luqadaha, qaybta kaalmada adeegyada, waxay dayanna roca . So [facility] [phone].    ATENCIÓN: Si habla español, tiene a xiong disposición servicios gratuitos de asistencia lingüística. Llame al [phone].    We comply with applicable federal civil rights laws and [place] laws. We do not discriminate on the basis of race, color, national origin, age, disability, sex, sexual orientation, or gender identity.               Review of your medicines      START taking        Dose / Directions    acetaminophen 325 MG tablet   Commonly known as:  TYLENOL        Dose:  650 mg   Take 2 tablets (650 mg) by mouth every 4 hours as needed for mild pain   Quantity:  100 tablet   Refills:  0       cephALEXin 500 MG capsule   Commonly known as:  KEFLEX   Used for:  Urinary tract infection with hematuria, site unspecified        Dose:  500 mg   Take 1  capsule (500 mg) by mouth 2 times daily   Quantity:  12 capsule   Refills:  0         CONTINUE these medicines which have NOT CHANGED        Dose / Directions    ibuprofen 800 MG tablet   Commonly known as:  ADVIL/MOTRIN        Dose:  800 mg   Take 800 mg by mouth 4 times daily   Refills:  0       tretinoin 0.05 % cream   Commonly known as:  RETIN-A        Apply topically At Bedtime   Refills:  0            Where to get your medicines      These medications were sent to [facility] [address] [address]  [address] [address] [address]     Phone:  [phone]     cephALEXin 500 MG capsule         Some of these will need a paper prescription and others can be bought over the counter. Ask your nurse if you have questions.     You don't need a prescription for these medications     acetaminophen 325 MG tablet                Protect others around you: Learn how to safely use, store and throw away your medicines at www.disposemymeds.org.        ANTIBIOTIC INSTRUCTION     You've Been Prescribed an Antibiotic - Now What?  Your healthcare team thinks that you or your loved one might have an infection. Some infections can be treated with antibiotics, which are powerful, life-saving drugs. Like all medications, antibiotics have side effects and should only be used when necessary. There are some important things you should know about your antibiotic treatment.      Your healthcare team may run tests before you start taking an antibiotic.    Your team may take samples (e.g., from your blood, urine or other areas) to run tests to look for bacteria. These test can be important to determine if you need an antibiotic at all and, if you do, which antibiotic will work best.      Within a few days, your healthcare team might change or even stop your antibiotic.    Your team may start you on an antibiotic while they are working to find out what is making you sick.    Your team might  change your antibiotic because test results show that a different antibiotic would be better to treat your infection.    In some cases, once your team has more information, they learn that you do not need an antibiotic at all. They may find out that you don't have an infection, or that the antibiotic you're taking won't work against your infection. For example, an infection caused by a virus can't be treated with antibiotics. Staying on an antibiotic when you don't need it is more likely to be harmful than helpful.      You may experience side effects from your antibiotic.    Like all medications, antibiotics have side effects. Some of these can be serious.    Let you healthcare team know if you have any known allergies when you are admitted to the hospital.    One significant side effect of nearly all antibiotics is the risk of severe and sometimes deadly diarrhea caused by Clostridium difficile (C. Difficile). This occurs when a person takes antibiotics because some good germs are destroyed. Antibiotic use allows C. diificile to take over, putting patients at high risk for this serious infection.    As a patient or caregiver, it is important to understand your or your loved one's antibiotic treatment. It is especially important for caregivers to speak up when patients can't speak for themselves. Here are some important questions to ask your healthcare team.    What infection is this antibiotic treating and how do you know I have that infection?    What side effects might occur from this antibiotic?    How long will I need to take this antibiotic?    Is it safe to take this antibiotic with other medications or supplements (e.g., vitamins) that I am taking?     Are there any special directions I need to know about taking this antibiotic? For example, should I take it with food?    How will I be monitored to know whether my infection is responding to the antibiotic?    What tests may help to make sure the right  antibiotic is prescribed for me?      Information provided by:  www.cdc.gov/getsmart  U.S. Department of Health and Human Services  Centers for disease Control and Prevention  National Center for Emerging and Zoonotic Infectious Diseases  Division of Healthcare Quality Promotion             Medication List: This is a list of all your medications and when to take them. Check marks below indicate your daily home schedule. Keep this list as a reference.      Medications           Morning Afternoon Evening Bedtime As Needed    acetaminophen 325 MG tablet   Commonly known as:  TYLENOL   Take 2 tablets (650 mg) by mouth every 4 hours as needed for mild pain   Last time this was given:  650 mg on 10/14/2018  1:32 AM                                cephALEXin 500 MG capsule   Commonly known as:  KEFLEX   Take 1 capsule (500 mg) by mouth 2 times daily                                ibuprofen 800 MG tablet   Commonly known as:  ADVIL/MOTRIN   Take 800 mg by mouth 4 times daily                                tretinoin 0.05 % cream   Commonly known as:  RETIN-A   Apply topically At Bedtime

## 2018-10-14 ENCOUNTER — ANESTHESIA (OUTPATIENT)
Dept: SURGERY | Facility: CLINIC | Age: 28
End: 2018-10-14
Payer: MEDICAID

## 2018-10-14 ENCOUNTER — ANESTHESIA EVENT (OUTPATIENT)
Dept: SURGERY | Facility: CLINIC | Age: 28
End: 2018-10-14
Payer: MEDICAID

## 2018-10-14 VITALS
TEMPERATURE: 97.2 F | WEIGHT: 192 LBS | HEART RATE: 94 BPM | SYSTOLIC BLOOD PRESSURE: 104 MMHG | DIASTOLIC BLOOD PRESSURE: 82 MMHG | BODY MASS INDEX: 30.53 KG/M2 | RESPIRATION RATE: 16 BRPM | OXYGEN SATURATION: 98 %

## 2018-10-14 DIAGNOSIS — N20.0 CALCULUS OF KIDNEY: Primary | ICD-10-CM

## 2018-10-14 LAB
BACTERIA SPEC CULT: NORMAL
Lab: NORMAL
SPECIMEN SOURCE: NORMAL

## 2018-10-14 PROCEDURE — 37000008 ZZH ANESTHESIA TECHNICAL FEE, 1ST 30 MIN: Performed by: UROLOGY

## 2018-10-14 PROCEDURE — 36000052 ZZH SURGERY LEVEL 2 EA 15 ADDTL MIN: Performed by: UROLOGY

## 2018-10-14 PROCEDURE — 25000132 ZZH RX MED GY IP 250 OP 250 PS 637: Performed by: INTERNAL MEDICINE

## 2018-10-14 PROCEDURE — 25000128 H RX IP 250 OP 636: Performed by: UROLOGY

## 2018-10-14 PROCEDURE — 96376 TX/PRO/DX INJ SAME DRUG ADON: CPT

## 2018-10-14 PROCEDURE — 99204 OFFICE O/P NEW MOD 45 MIN: CPT | Mod: 57 | Performed by: UROLOGY

## 2018-10-14 PROCEDURE — 36000050 ZZH SURGERY LEVEL 2 1ST 30 MIN: Performed by: UROLOGY

## 2018-10-14 PROCEDURE — 37000009 ZZH ANESTHESIA TECHNICAL FEE, EACH ADDTL 15 MIN: Performed by: UROLOGY

## 2018-10-14 PROCEDURE — 25000128 H RX IP 250 OP 636: Performed by: ANESTHESIOLOGY

## 2018-10-14 PROCEDURE — 25000128 H RX IP 250 OP 636: Performed by: NURSE ANESTHETIST, CERTIFIED REGISTERED

## 2018-10-14 PROCEDURE — 27210794 ZZH OR GENERAL SUPPLY STERILE: Performed by: UROLOGY

## 2018-10-14 PROCEDURE — 71000027 ZZH RECOVERY PHASE 2 EACH 15 MINS: Performed by: UROLOGY

## 2018-10-14 PROCEDURE — 52310 CYSTOSCOPY AND TREATMENT: CPT | Performed by: UROLOGY

## 2018-10-14 PROCEDURE — 99217 ZZC OBSERVATION CARE DISCHARGE: CPT | Performed by: PHYSICIAN ASSISTANT

## 2018-10-14 PROCEDURE — 96375 TX/PRO/DX INJ NEW DRUG ADDON: CPT

## 2018-10-14 PROCEDURE — G0378 HOSPITAL OBSERVATION PER HR: HCPCS

## 2018-10-14 PROCEDURE — 25800025 ZZH RX 258: Performed by: UROLOGY

## 2018-10-14 PROCEDURE — 25000125 ZZHC RX 250: Performed by: NURSE ANESTHETIST, CERTIFIED REGISTERED

## 2018-10-14 PROCEDURE — 40000306 ZZH STATISTIC PRE PROC ASSESS II: Performed by: UROLOGY

## 2018-10-14 PROCEDURE — 25000128 H RX IP 250 OP 636: Performed by: INTERNAL MEDICINE

## 2018-10-14 RX ORDER — SODIUM CHLORIDE, SODIUM LACTATE, POTASSIUM CHLORIDE, CALCIUM CHLORIDE 600; 310; 30; 20 MG/100ML; MG/100ML; MG/100ML; MG/100ML
INJECTION, SOLUTION INTRAVENOUS CONTINUOUS
Status: DISCONTINUED | OUTPATIENT
Start: 2018-10-14 | End: 2018-10-14 | Stop reason: HOSPADM

## 2018-10-14 RX ORDER — CEFAZOLIN SODIUM 2 G/100ML
2 INJECTION, SOLUTION INTRAVENOUS
Status: COMPLETED | OUTPATIENT
Start: 2018-10-14 | End: 2018-10-14

## 2018-10-14 RX ORDER — ONDANSETRON 2 MG/ML
4 INJECTION INTRAMUSCULAR; INTRAVENOUS EVERY 30 MIN PRN
Status: DISCONTINUED | OUTPATIENT
Start: 2018-10-14 | End: 2018-10-14 | Stop reason: HOSPADM

## 2018-10-14 RX ORDER — FENTANYL CITRATE 50 UG/ML
INJECTION, SOLUTION INTRAMUSCULAR; INTRAVENOUS PRN
Status: DISCONTINUED | OUTPATIENT
Start: 2018-10-14 | End: 2018-10-14

## 2018-10-14 RX ORDER — ONDANSETRON 4 MG/1
4 TABLET, ORALLY DISINTEGRATING ORAL EVERY 30 MIN PRN
Status: DISCONTINUED | OUTPATIENT
Start: 2018-10-14 | End: 2018-10-14 | Stop reason: HOSPADM

## 2018-10-14 RX ORDER — CEFAZOLIN SODIUM 1 G/3ML
1 INJECTION, POWDER, FOR SOLUTION INTRAMUSCULAR; INTRAVENOUS SEE ADMIN INSTRUCTIONS
Status: DISCONTINUED | OUTPATIENT
Start: 2018-10-14 | End: 2018-10-14 | Stop reason: HOSPADM

## 2018-10-14 RX ORDER — LIDOCAINE HYDROCHLORIDE 10 MG/ML
INJECTION, SOLUTION INFILTRATION; PERINEURAL PRN
Status: DISCONTINUED | OUTPATIENT
Start: 2018-10-14 | End: 2018-10-14

## 2018-10-14 RX ORDER — NALOXONE HYDROCHLORIDE 0.4 MG/ML
.1-.4 INJECTION, SOLUTION INTRAMUSCULAR; INTRAVENOUS; SUBCUTANEOUS
Status: DISCONTINUED | OUTPATIENT
Start: 2018-10-14 | End: 2018-10-14 | Stop reason: HOSPADM

## 2018-10-14 RX ORDER — MEPERIDINE HYDROCHLORIDE 25 MG/ML
12.5 INJECTION INTRAMUSCULAR; INTRAVENOUS; SUBCUTANEOUS
Status: DISCONTINUED | OUTPATIENT
Start: 2018-10-14 | End: 2018-10-14 | Stop reason: HOSPADM

## 2018-10-14 RX ORDER — CEPHALEXIN 500 MG/1
500 CAPSULE ORAL 2 TIMES DAILY
Qty: 12 CAPSULE | Refills: 0 | Status: SHIPPED | OUTPATIENT
Start: 2018-10-14 | End: 2018-11-05

## 2018-10-14 RX ORDER — ONDANSETRON 2 MG/ML
INJECTION INTRAMUSCULAR; INTRAVENOUS PRN
Status: DISCONTINUED | OUTPATIENT
Start: 2018-10-14 | End: 2018-10-14

## 2018-10-14 RX ORDER — FENTANYL CITRATE 50 UG/ML
25-50 INJECTION, SOLUTION INTRAMUSCULAR; INTRAVENOUS
Status: DISCONTINUED | OUTPATIENT
Start: 2018-10-14 | End: 2018-10-14 | Stop reason: HOSPADM

## 2018-10-14 RX ORDER — ACETAMINOPHEN 325 MG/1
650 TABLET ORAL EVERY 4 HOURS PRN
Qty: 100 TABLET | Refills: 0 | Status: ON HOLD | COMMUNITY
Start: 2018-10-14 | End: 2018-10-24

## 2018-10-14 RX ORDER — PROPOFOL 10 MG/ML
INJECTION, EMULSION INTRAVENOUS PRN
Status: DISCONTINUED | OUTPATIENT
Start: 2018-10-14 | End: 2018-10-14

## 2018-10-14 RX ORDER — LIDOCAINE 40 MG/G
CREAM TOPICAL
Status: DISCONTINUED | OUTPATIENT
Start: 2018-10-14 | End: 2018-10-14 | Stop reason: HOSPADM

## 2018-10-14 RX ORDER — HYDROMORPHONE HYDROCHLORIDE 1 MG/ML
.3-.5 INJECTION, SOLUTION INTRAMUSCULAR; INTRAVENOUS; SUBCUTANEOUS EVERY 10 MIN PRN
Status: DISCONTINUED | OUTPATIENT
Start: 2018-10-14 | End: 2018-10-14 | Stop reason: HOSPADM

## 2018-10-14 RX ADMIN — PROPOFOL 30 MG: 10 INJECTION, EMULSION INTRAVENOUS at 11:07

## 2018-10-14 RX ADMIN — PROPOFOL 30 MG: 10 INJECTION, EMULSION INTRAVENOUS at 10:58

## 2018-10-14 RX ADMIN — SENNOSIDES AND DOCUSATE SODIUM 1 TABLET: 8.6; 5 TABLET ORAL at 09:40

## 2018-10-14 RX ADMIN — SODIUM CHLORIDE: 9 INJECTION, SOLUTION INTRAVENOUS at 00:25

## 2018-10-14 RX ADMIN — CEFAZOLIN SODIUM 2 G: 2 INJECTION, SOLUTION INTRAVENOUS at 10:54

## 2018-10-14 RX ADMIN — FENTANYL CITRATE 50 MCG: 50 INJECTION, SOLUTION INTRAMUSCULAR; INTRAVENOUS at 10:58

## 2018-10-14 RX ADMIN — TAMSULOSIN HYDROCHLORIDE 0.4 MG: 0.4 CAPSULE ORAL at 09:40

## 2018-10-14 RX ADMIN — SODIUM CHLORIDE, POTASSIUM CHLORIDE, SODIUM LACTATE AND CALCIUM CHLORIDE: 600; 310; 30; 20 INJECTION, SOLUTION INTRAVENOUS at 10:54

## 2018-10-14 RX ADMIN — LIDOCAINE HYDROCHLORIDE 30 MG: 10 INJECTION, SOLUTION INFILTRATION; PERINEURAL at 10:58

## 2018-10-14 RX ADMIN — ACETAMINOPHEN 650 MG: 325 TABLET, FILM COATED ORAL at 01:32

## 2018-10-14 RX ADMIN — PROPOFOL 20 MG: 10 INJECTION, EMULSION INTRAVENOUS at 11:04

## 2018-10-14 RX ADMIN — MIDAZOLAM 2 MG: 1 INJECTION INTRAMUSCULAR; INTRAVENOUS at 10:55

## 2018-10-14 RX ADMIN — ONDANSETRON 4 MG: 2 INJECTION INTRAMUSCULAR; INTRAVENOUS at 11:04

## 2018-10-14 RX ADMIN — PROPOFOL 20 MG: 10 INJECTION, EMULSION INTRAVENOUS at 11:11

## 2018-10-14 ASSESSMENT — PAIN DESCRIPTION - DESCRIPTORS: DESCRIPTORS: CONSTANT;SHARP

## 2018-10-14 ASSESSMENT — ENCOUNTER SYMPTOMS: DYSRHYTHMIAS: 0

## 2018-10-14 ASSESSMENT — LIFESTYLE VARIABLES: TOBACCO_USE: 1

## 2018-10-14 NOTE — PLAN OF CARE
Problem: Patient Care Overview  Goal: Plan of Care/Patient Progress Review  Outcome: No Change  PRIMARY DIAGNOSIS: ACUTE PAIN - flank pain  OUTPATIENT/OBSERVATION GOALS TO BE MET BEFORE DISCHARGE:  1. Pain Status: Pain tolerable, no medications given, only pain with urination.    2. Return to near baseline physical activity: Yes    3. Cleared for discharge by consultants (if involved): No - urology consulted, stent to be removed    Discharge Planner Nurse   Safe discharge environment identified: Yes  Barriers to discharge: No       Entered by: Vu De Jesus 10/14/2018 9:58 AM     Call from urology this morning, plan to remove stent.  Pt. Resting comfortably, sleeping.  Fluids at 75ccs/hr.  Pt.'s wife present.  No temps overnight.     Please review provider order for any additional goals.   Nurse to notify provider when observation goals have been met and patient is ready for discharge.

## 2018-10-14 NOTE — ANESTHESIA POSTPROCEDURE EVALUATION
Patient: Ayana Prasad    Procedure(s):  COMBINED CYSTOSCOPY, REMOVE LEFT STENT - Wound Class: II-Clean Contaminated    Diagnosis:ureteral stent  Diagnosis Additional Information: L Ureteral Stent    Anesthesia Type:  MAC    Note:  Anesthesia Post Evaluation    Patient location during evaluation: Phase 2  Patient participation: Able to fully participate in evaluation  Level of consciousness: awake  Pain management: adequate  Airway patency: patent  Cardiovascular status: acceptable  Respiratory status: acceptable  Hydration status: euvolemic  PONV: controlled     Anesthetic complications: None          Last vitals:  Vitals:    10/14/18 1130 10/14/18 1135 10/14/18 1140   BP: 110/73 112/78 125/87   Pulse:      Resp:   14   Temp:      SpO2:  98% 92%         Electronically Signed By: Stiven Triana MD  October 14, 2018  11:57 AM
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
## 2018-10-14 NOTE — PLAN OF CARE
PRIMARY DIAGNOSIS: ACUTE RENAL COLIC     OUTPATIENT/OBSERVATION GOALS TO BE MET BEFORE DISCHARGE  1. Pain Status: Improved with PO pain medications     2. Tolerating adequate PO diet: NPO at 00:00     3. Surgical Intervention planned: Pending urology consult     4. Cleared by consultants (if involved): No - to see in AM     5. Return to near baseline physical activity: Yes     Discharge Planner Nurse   Safe discharge environment identified: Yes  Barriers to discharge: No       Entered by: Jyothi Dale 10/14/2018 1:27 AM  Please review provider order for any additional goals.   Nurse to notify provider when observation goals have been met and patient is ready for discharge.     VSS. Pt rating pain 6/10. Requested tylenol for pain, given and effective. Pt describes left flank pain as constant and sharp. Pt denies dysuria or increased pain while urinating. Pt has been NPO since midnight for urology consult this AM and possible procedure. Pt denies nausea at this time. Spouse resting at bedside. NS at 75/hr. Will continue to monitor and provide supportive cares.

## 2018-10-14 NOTE — PROGRESS NOTES
Patient's demographics show no insurance. CM contacted Financial Counselor's office [phone]) to request follow up with patient re: insurance.     Shannan Jean RN, BSN, CTS  [facility]  [phone]

## 2018-10-14 NOTE — PHARMACY-ADMISSION MEDICATION HISTORY
Admission medication history interview status for this patient is complete. See [facility] admission navigator for allergy information, prior to admission medications and immunization status.     Medication history interview source(s):Patient  Medication history resources (including written lists, pill bottles, clinic record):[facility] med list and care everywhere  Primary pharmacy:    Changes made to [facility] medication list:  Added: ibuprofen  Deleted: Flomax  Changed: none    Actions taken by pharmacist (provider contacted, etc):None     Additional medication history information:None    Medication reconciliation/reorder completed by provider prior to medication history? No    Do you take OTC medications (eg tylenol, ibuprofen, fish oil, eye/ear drops, etc)? yes(Y/N)    For patients on insulin therapy: NO (Y/N)  Lantus/levemir/NPH/Mix 70/30 dose:   (Y/N) (see Med list for doses)   Sliding scale Novolog Y/N  If Yes, do you have a baseline novolog pre-meal dose:  units with meals  Patients eat three meals a day:   Y/N    How many episodes of hypoglycemia do you have per week: _______  How many missed doses do you have per week: ______  How many times do you check your blood glucose per day: _______  Do you have a Continuous glucose monitor (CGM)   Y/N (remind pt that not approved for hospital use)   Any Barriers to therapy - Be specific :  cost of medications, comfortable with giving injections (if applicable), comfortable and confident with current diabetes regimen: Y/N ______________      Prior to Admission medications    Medication Sig Last Dose Taking? Auth Provider   ibuprofen (ADVIL/MOTRIN) 800 MG tablet Take 800 mg by mouth 4 times daily 10/13/2018 at am Yes Unknown, Entered By History   tretinoin (RETIN-A) 0.05 % cream Apply topically At Bedtime 10/12/2018 at hs Yes Cyn Manzanares, [place] CNP

## 2018-10-14 NOTE — OP NOTE
Procedure Date: 10/14/2018      DATE OF PROCEDURE:  10/14/2018      POSTOPERATIVE DIAGNOSIS:  History of left ureteral stone with stent in place.      POSTOPERATIVE DIAGNOSIS:  History of left ureteral stone with stent in place.      PROCEDURE:  Cystoscopy and removal of left-sided stent.      ANESTHESIA:  MAC.      SURGEON:  Mariel Zarate MD      INDICATIONS:  Please see preoperative consultation.      DESCRIPTION OF PROCEDURE:  The patient was brought to the operative suite and after being administered intravenous sedation, was placed in the dorsal lithotomy position with the genitalia prepped and draped in customary fashion.  A timeout was then called.      The 21-[place] cystoscope was gently passed through the urethra, which was normal into the bladder.  The interior of the bladder was carefully inspected.  No neoplasm or stone were seen in the bladder.  The stent could be seen with partial calcification on the end of the stent coming from the left ureteric orifice.  This was gently grasped and then removed without difficulty.      CONCLUSION:  The patient will be monitored today in case she does get further flank pain or fever.  If all is well, she can go home probably later today or if necessary, even tomorrow morning.  She will follow up with her urologist who placed the stent.  My recommendation is that she probably should have KUB when she seen in followup to see if the stone in the lower herbert of the left kidney is easy to see and that she will be a good candidate for ESWL if elective treatment of the stone is desired.      Of equal importance, however, is the need for metabolic stone evaluation given her history of recurrent urinary stone disease to try and prevent further stones forming in the future.         MARIEL ZARATE MD             D: 10/14/2018   T: 10/14/2018   MT: CC      Name:     SOMMER GARCIA   MRN:      [id number]        Account:        [id number]   :      1990            Procedure Date: 10/14/2018      Document: [id number]       cc: JENNIFER BARAHONA, NELLY Olivo MD
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
## 2018-10-14 NOTE — DISCHARGE INSTRUCTIONS
CYSTOSCOPY DISCHARGE INSTRUCTIONS  On license of [facility] / UROLOGY  [facility]  [phone]    YOU MAY GO BACK TO YOUR NORMAL DIET AND ACTIVITY, UNLESS YOUR DOCTOR TELLS YOU NOT TO.    FOR THE NEXT TWO DAYS, YOU MAY NOTICE:    SOME BLOOD IN YOUR URINE.  SOME BURNING WHEN YOU URINATE (USE THE TOILET).  AN URGE TO URINATE MORE OFTEN.  BLADDER SPASMS.    THESE ARE NORMAL AFTER THE PROCEDURE.  THEY SHOULD GO AWAY AFTER A DAY OR TWO.  TO RELIEVE THESE PROBLEMS:     DRINK 6 TO 8 LARGE GLASSES OF WATER EACH DAY (INCLUDES DRINKS AT MEALS).  THIS WILL HELP CLEAR THE URINE.    TAKE WARM BATHS TO RELIEVE PAIN AND BLADDER SPASMS.  DO NOT ADD ANYTHING TO THE BATH WATER.    YOUR DOCTOR MAY PRESCRIBE PAIN MEDICINE.  YOU MAY ALSO TAKE TYLENOL (ACETAMINOPHEN) FOR PAIN.    CALL YOUR SURGEON IF YOU HAVE:    A FEVER OVER 101 DEGREES.  CHECK YOUR TEMPERATURE UNDER YOUR TONGUE.    CHILLS.    FAILURE TO URINATE (NO URINE COMES OUT WHEN YOU TRY TO USE THE TOILET).  TRY SOAKING IN A BATHTUB FULL OF WARM WATER.  IF STILL NO URINE, CALL YOUR DOCTOR.    A LOT OF BLOOD IN THE URINE, OR BLOOD CLOTS LARGER THAN A NICKEL.      PAIN IN THE BACK OR BELLY AREA (ABDOMEN).    PAIN OR SPASMS THAT ARE NOT RELIEVED BY WARM TUB BATHS AND PAIN MEDICINE.      SEVERE PAIN, BURNING OR OTHER PROBLEMS WHILE PASSING URINE.    PAIN THAT GETS WORSE AFTER TWO DAYS.     GENERAL ANESTHESIA OR SEDATION ADULT DISCHARGE INSTRUCTIONS   SPECIAL PRECAUTIONS FOR 24 HOURS AFTER SURGERY    IT IS NOT UNUSUAL TO FEEL LIGHT-HEADED OR FAINT, UP TO 24 HOURS AFTER SURGERY OR WHILE TAKING PAIN MEDICATION.  IF YOU HAVE THESE SYMPTOMS; SIT FOR A FEW MINUTES BEFORE STANDING AND HAVE SOMEONE ASSIST YOU WHEN YOU GET UP TO WALK OR USE THE BATHROOM.    YOU SHOULD REST AND RELAX FOR THE NEXT 24 HOURS AND YOU MUST MAKE ARRANGEMENTS TO HAVE SOMEONE STAY WITH YOU FOR AT LEAST 24 HOURS AFTER YOUR DISCHARGE.  AVOID HAZARDOUS AND STRENUOUS ACTIVITIES.  DO NOT MAKE IMPORTANT  DECISIONS FOR 24 HOURS.    DO NOT DRIVE ANY VEHICLE OR OPERATE MECHANICAL EQUIPMENT FOR 24 HOURS FOLLOWING THE END OF YOUR SURGERY.  EVEN THOUGH YOU MAY FEEL NORMAL, YOUR REACTIONS MAY BE AFFECTED BY THE MEDICATION YOU HAVE RECEIVED.    DO NOT DRINK ALCOHOLIC BEVERAGES FOR 24 HOURS FOLLOWING YOUR SURGERY.    DRINK CLEAR LIQUIDS (APPLE JUICE, GINGER ALE, 7-UP, BROTH, ETC.).  PROGRESS TO YOUR REGULAR DIET AS YOU FEEL ABLE.    YOU MAY HAVE A DRY MOUTH, A SORE THROAT, MUSCLES ACHES OR TROUBLE SLEEPING.  THESE SHOULD GO AWAY AFTER 24 HOURS.    CALL YOUR DOCTOR FOR ANY OF THE FOLLOWING:  SIGNS OF INFECTION (FEVER, GROWING TENDERNESS AT THE SURGERY SITE, A LARGE AMOUNT OF DRAINAGE OR BLEEDING, SEVERE PAIN, FOUL-SMELLING DRAINAGE, REDNESS OR SWELLING.    IT HAS BEEN OVER 8 TO 10 HOURS SINCE SURGERY AND YOU ARE STILL NOT ABLE TO URINATE (PASS WATER).

## 2018-10-14 NOTE — PLAN OF CARE
Problem: Patient Care Overview  Goal: Plan of Care/Patient Progress Review  ROOM  231    Living Situation lives independently in the community with spouser  Facility name:  [facility]  : Khadar Prasad, spouse   [phone]    Activity level at baseline: independent  Activity level on admit: independent    Patient registered to observation; given Patient Bill of Rights; given the opportunity to ask questions about observation status and their plan of care.  Patient has been oriented to the observation room, bathroom and call light is in place.    Discussed discharge goals and expectations with patient/family.

## 2018-10-14 NOTE — ED NOTES
[facility]  ED Nurse Handoff Report    Ayana Prasad is a 28 year old female   ED Chief complaint: Flank Pain  . ED Diagnosis:   Final diagnoses:   Left flank pain   Urinary tract infection with hematuria, site unspecified     Allergies:   Allergies   Allergen Reactions     Adhesive Tape Hives     Prednisone Other (See Comments) and Hives     Suicidal ideation     Droperidol Anxiety       Code Status: Full Code  Activity level - Baseline/Home:  Independent. Activity Level - Current:   Independent. Lift room needed: No. Bariatric: No   Needed: No   Isolation: No. Infection: Not Applicable.     Vital Signs:   Vitals:    10/13/18 1715 10/13/18 1838 10/13/18 1845 10/13/18 1900   BP:       Resp:       Temp:       TempSrc:       SpO2: 96% 98% 98% 94%   Weight:           Cardiac Rhythm:  ,      Pain level: 0-10 Pain Scale: 7  Patient confused: No. Patient Falls Risk: Yes.   Elimination Status: Has voided   Patient Report - Initial Complaint: Flank pain. Focused Assessment: Patient has a Hx of kidney stones. Had a stent placed in left kidney. Patient's pain became uncontrolled today which prompted patient to visit ED. Patient found to have UTI and is being admitted for IV abx and pain control.   Tests Performed:   Abd/pelvis CT no contrast - Stone Protocol   Preliminary Result   IMPRESSION:   1. Moderate left hydronephrosis has increased since the prior exam.   Left ureter stent is in place. No visible left ureter stone is   identified.   2. Bilateral intrarenal stones again noted.   3. Fatty liver.        . Abnormal Results:   Labs Ordered and Resulted from Time of ED Arrival Up to the Time of Departure from the ED   BASIC METABOLIC PANEL - Abnormal; Notable for the following:        Result Value    Chloride 110 (*)     All other components within normal limits   ROUTINE UA WITH MICROSCOPIC - Abnormal; Notable for the following:     Blood Urine Large (*)     Protein Albumin Urine 100 (*)      Leukocyte Esterase Urine Large (*)     WBC Urine 31 (*)     RBC Urine >182 (*)     Bacteria Urine Few (*)     Squamous Epithelial /HPF Urine 2 (*)     Mucous Urine Present (*)     All other components within normal limits   ISTAT  GASES LACTATE SINAN POCT - Abnormal; Notable for the following:     Ph Venous 7.50 (*)     PCO2 Venous 27 (*)     PO2 Venous 56 (*)     All other components within normal limits   CBC WITH PLATELETS DIFFERENTIAL   ISTAT HCG QUANTITATIVE PREGNANCY NURSING POCT   ISTAT CG4 GASES LACTATE SINAN NURSING POCT   ISTAT HCG QUANTITATIVE PREGNANCY POCT   URINE CULTURE AEROBIC BACTERIAL   .   Treatments provided: IV, pain meds, fluids, imaging  Family Comments: Wife at the bedside  OBS brochure/video discussed/provided to patient:  Yes  ED Medications:   Medications   HYDROmorphone (PF) (DILAUDID) injection 0.5 mg (0.5 mg Intravenous Given 10/13/18 1835)   ketorolac (TORADOL) injection 15 mg (15 mg Intravenous Given 10/13/18 1701)   0.9% sodium chloride BOLUS (1,000 mLs Intravenous New Bag 10/13/18 1743)   cefTRIAXone (ROCEPHIN) 1 g vial to attach to  mL bag for ADULTS or NS 50 mL bag for PEDS (1 g Intravenous New Bag 10/13/18 1833)     Drips infusing:  Yes  For the majority of the shift, the patient's behavior Green. Interventions performed were N/A.     Severe Sepsis OR Septic Shock Diagnosis Present: No      ED Nurse Name/Phone Number: Linh TOMSyed De Jesus,   7:01 PM    RECEIVING UNIT ED HANDOFF REVIEW    Above ED Nurse Handoff Report was reviewed: yes  Reviewed by: Charla Wheeler on October 13, 2018 at 8:30 PM

## 2018-10-14 NOTE — ANESTHESIA CARE TRANSFER NOTE
Patient: Ayana Prasad    Procedure(s):  COMBINED CYSTOSCOPY, REMOVE LEFT STENT - Wound Class: II-Clean Contaminated    Diagnosis: ureteral stent  Diagnosis Additional Information: No value filed.    Anesthesia Type:   MAC     Note:  Airway :Room Air  Patient transferred to:PACU  Comments: VSS.Handoff Report: Identifed the Patient, Identified the Reponsible Provider, Reviewed the pertinent medical history, Discussed the surgical course, Reviewed Intra-OP anesthesia mangement and issues during anesthesia, Set expectations for post-procedure period and Allowed opportunity for questions and acknowledgement of understanding      Vitals: (Last set prior to Anesthesia Care Transfer)    CRNA VITALS  10/14/2018 1049 - 10/14/2018 1127      10/14/2018             Pulse: 77    SpO2: 95 %                Electronically Signed By: BROOKLYN Gage CRNA  October 14, 2018  11:27 AM
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
## 2018-10-16 ENCOUNTER — PATIENT OUTREACH (OUTPATIENT)
Dept: CARE COORDINATION | Facility: CLINIC | Age: 28
End: 2018-10-16

## 2018-10-16 ENCOUNTER — HOSPITAL ENCOUNTER (EMERGENCY)
Facility: CLINIC | Age: 28
Discharge: HOME | End: 2018-10-16
Attending: EMERGENCY MEDICINE | Admitting: EMERGENCY MEDICINE
Payer: MEDICAID

## 2018-10-16 ENCOUNTER — APPOINTMENT (OUTPATIENT)
Dept: CT IMAGING | Facility: CLINIC | Age: 28
End: 2018-10-16
Attending: EMERGENCY MEDICINE
Payer: MEDICAID

## 2018-10-16 VITALS
BODY MASS INDEX: 29.82 KG/M2 | OXYGEN SATURATION: 98 % | WEIGHT: 190 LBS | RESPIRATION RATE: 18 BRPM | TEMPERATURE: 98.5 F | HEART RATE: 111 BPM | SYSTOLIC BLOOD PRESSURE: 128 MMHG | DIASTOLIC BLOOD PRESSURE: 96 MMHG | HEIGHT: 67 IN

## 2018-10-16 DIAGNOSIS — N20.0 NEPHROLITHIASIS: ICD-10-CM

## 2018-10-16 DIAGNOSIS — R10.9 ACUTE LEFT FLANK PAIN: ICD-10-CM

## 2018-10-16 LAB
ALBUMIN SERPL-MCNC: 3.8 G/DL (ref 3.4–5)
ALBUMIN UR-MCNC: NEGATIVE MG/DL
ALP SERPL-CCNC: 68 U/L (ref 40–150)
ALT SERPL W P-5'-P-CCNC: 39 U/L (ref 0–50)
ANION GAP SERPL CALCULATED.3IONS-SCNC: 9 MMOL/L (ref 3–14)
APPEARANCE UR: ABNORMAL
AST SERPL W P-5'-P-CCNC: 21 U/L (ref 0–45)
BASOPHILS # BLD AUTO: 0 10E9/L (ref 0–0.2)
BASOPHILS NFR BLD AUTO: 0.2 %
BILIRUB SERPL-MCNC: 0.3 MG/DL (ref 0.2–1.3)
BILIRUB UR QL STRIP: NEGATIVE
BUN SERPL-MCNC: 9 MG/DL (ref 7–30)
CALCIUM SERPL-MCNC: 8.9 MG/DL (ref 8.5–10.1)
CHLORIDE SERPL-SCNC: 110 MMOL/L (ref 94–109)
CO2 SERPL-SCNC: 23 MMOL/L (ref 20–32)
COLOR UR AUTO: YELLOW
CREAT SERPL-MCNC: 0.89 MG/DL (ref 0.52–1.04)
DIFFERENTIAL METHOD BLD: ABNORMAL
EOSINOPHIL # BLD AUTO: 0.2 10E9/L (ref 0–0.7)
EOSINOPHIL NFR BLD AUTO: 1.3 %
ERYTHROCYTE [DISTWIDTH] IN BLOOD BY AUTOMATED COUNT: 13.7 % (ref 10–15)
GFR SERPL CREATININE-BSD FRML MDRD: 75 ML/MIN/1.7M2
GLUCOSE SERPL-MCNC: 106 MG/DL (ref 70–99)
GLUCOSE UR STRIP-MCNC: NEGATIVE MG/DL
HCG UR QL: NEGATIVE
HCT VFR BLD AUTO: 42 % (ref 35–47)
HGB BLD-MCNC: 13.8 G/DL (ref 11.7–15.7)
HGB UR QL STRIP: NEGATIVE
IMM GRANULOCYTES # BLD: 0 10E9/L (ref 0–0.4)
IMM GRANULOCYTES NFR BLD: 0.2 %
INTERNAL QC OK POCT: YES
KETONES UR STRIP-MCNC: NEGATIVE MG/DL
LEUKOCYTE ESTERASE UR QL STRIP: ABNORMAL
LIPASE SERPL-CCNC: 239 U/L (ref 73–393)
LYMPHOCYTES # BLD AUTO: 2.2 10E9/L (ref 0.8–5.3)
LYMPHOCYTES NFR BLD AUTO: 16.9 %
MCH RBC QN AUTO: 28.6 PG (ref 26.5–33)
MCHC RBC AUTO-ENTMCNC: 32.9 G/DL (ref 31.5–36.5)
MCV RBC AUTO: 87 FL (ref 78–100)
MONOCYTES # BLD AUTO: 0.6 10E9/L (ref 0–1.3)
MONOCYTES NFR BLD AUTO: 4.4 %
MUCOUS THREADS #/AREA URNS LPF: PRESENT /LPF
NEUTROPHILS # BLD AUTO: 9.8 10E9/L (ref 1.6–8.3)
NEUTROPHILS NFR BLD AUTO: 77 %
NITRATE UR QL: NEGATIVE
NRBC # BLD AUTO: 0 10*3/UL
NRBC BLD AUTO-RTO: 0 /100
PH UR STRIP: 6.5 PH (ref 5–7)
PLATELET # BLD AUTO: 314 10E9/L (ref 150–450)
POTASSIUM SERPL-SCNC: 3.9 MMOL/L (ref 3.4–5.3)
PROT SERPL-MCNC: 7.5 G/DL (ref 6.8–8.8)
RBC # BLD AUTO: 4.82 10E12/L (ref 3.8–5.2)
RBC #/AREA URNS AUTO: 6 /HPF (ref 0–2)
SODIUM SERPL-SCNC: 142 MMOL/L (ref 133–144)
SOURCE: ABNORMAL
SP GR UR STRIP: 1.01 (ref 1–1.03)
SQUAMOUS #/AREA URNS AUTO: 6 /HPF (ref 0–1)
UROBILINOGEN UR STRIP-MCNC: NORMAL MG/DL (ref 0–2)
WBC # BLD AUTO: 12.7 10E9/L (ref 4–11)
WBC #/AREA URNS AUTO: 6 /HPF (ref 0–5)

## 2018-10-16 PROCEDURE — 80053 COMPREHEN METABOLIC PANEL: CPT | Performed by: EMERGENCY MEDICINE

## 2018-10-16 PROCEDURE — 96376 TX/PRO/DX INJ SAME DRUG ADON: CPT | Performed by: EMERGENCY MEDICINE

## 2018-10-16 PROCEDURE — 81001 URINALYSIS AUTO W/SCOPE: CPT | Performed by: EMERGENCY MEDICINE

## 2018-10-16 PROCEDURE — 74176 CT ABD & PELVIS W/O CONTRAST: CPT

## 2018-10-16 PROCEDURE — 81025 URINE PREGNANCY TEST: CPT | Performed by: EMERGENCY MEDICINE

## 2018-10-16 PROCEDURE — 83690 ASSAY OF LIPASE: CPT | Performed by: EMERGENCY MEDICINE

## 2018-10-16 PROCEDURE — 25000128 H RX IP 250 OP 636: Performed by: EMERGENCY MEDICINE

## 2018-10-16 PROCEDURE — 99285 EMERGENCY DEPT VISIT HI MDM: CPT | Mod: 25 | Performed by: EMERGENCY MEDICINE

## 2018-10-16 PROCEDURE — 85025 COMPLETE CBC W/AUTO DIFF WBC: CPT | Performed by: EMERGENCY MEDICINE

## 2018-10-16 PROCEDURE — 96375 TX/PRO/DX INJ NEW DRUG ADDON: CPT | Performed by: EMERGENCY MEDICINE

## 2018-10-16 PROCEDURE — 96374 THER/PROPH/DIAG INJ IV PUSH: CPT | Performed by: EMERGENCY MEDICINE

## 2018-10-16 PROCEDURE — 99284 EMERGENCY DEPT VISIT MOD MDM: CPT | Mod: Z6 | Performed by: EMERGENCY MEDICINE

## 2018-10-16 RX ORDER — ONDANSETRON 2 MG/ML
8 INJECTION INTRAMUSCULAR; INTRAVENOUS ONCE
Status: COMPLETED | OUTPATIENT
Start: 2018-10-16 | End: 2018-10-16

## 2018-10-16 RX ORDER — HYDROCODONE BITARTRATE AND ACETAMINOPHEN 5; 325 MG/1; MG/1
1 TABLET ORAL EVERY 6 HOURS PRN
Qty: 15 TABLET | Refills: 0 | Status: ON HOLD | OUTPATIENT
Start: 2018-10-16 | End: 2018-10-24

## 2018-10-16 RX ORDER — TAMSULOSIN HYDROCHLORIDE 0.4 MG/1
0.4 CAPSULE ORAL DAILY
Qty: 10 CAPSULE | Refills: 0 | Status: SHIPPED | OUTPATIENT
Start: 2018-10-16 | End: 2019-02-25

## 2018-10-16 RX ORDER — HYDROMORPHONE HYDROCHLORIDE 1 MG/ML
0.5 INJECTION, SOLUTION INTRAMUSCULAR; INTRAVENOUS; SUBCUTANEOUS
Status: COMPLETED | OUTPATIENT
Start: 2018-10-16 | End: 2018-10-16

## 2018-10-16 RX ORDER — HYDROMORPHONE HYDROCHLORIDE 1 MG/ML
1 INJECTION, SOLUTION INTRAMUSCULAR; INTRAVENOUS; SUBCUTANEOUS ONCE
Status: COMPLETED | OUTPATIENT
Start: 2018-10-16 | End: 2018-10-16

## 2018-10-16 RX ADMIN — Medication 0.5 MG: at 04:58

## 2018-10-16 RX ADMIN — Medication 1 MG: at 03:56

## 2018-10-16 RX ADMIN — ONDANSETRON HYDROCHLORIDE 8 MG: 2 INJECTION INTRAMUSCULAR; INTRAVENOUS at 04:58

## 2018-10-16 ASSESSMENT — ACTIVITIES OF DAILY LIVING (ADL): DEPENDENT_IADLS:: INDEPENDENT

## 2018-10-16 NOTE — ED PROVIDER NOTES
"  History     Chief Complaint   Patient presents with     Flank Pain     The history is provided by the patient and medical records.     Ayana Prasad is a 28 year old female with hx of nephrolithiasis s/p cystoscopy and L ureteral stent removal 2 days ago, bipolar I, polysubstance abuse, PTSD who presents with left flank pain. Patient reports feeling normal until being awoken by acute onset left flank pain around midnight. Pain location and character similar to past episodes of kidney stones. No dysuria, no urgency/frequency. Patient is taking cephalexin for UTI since prior to stent removal. Nauseous but no vomiting. No recent constipation nor diarrhea.     I have reviewed the Medications, Allergies, Past Medical and Surgical History, and Social History in the Epic system.    Review of Systems  A complete review of systems was performed with pertinent positives and negatives noted in the HPI, and all other systems negative.     Physical Exam   BP: 119/78  Pulse: 111  Temp: 98.5  F (36.9  C)  Resp: 18  Height: 170.2 cm (5' 7\")  Weight: 86.2 kg (190 lb)  SpO2: 96 %      Physical Exam  BP (!) 128/96  Pulse 111  Temp 98.5  F (36.9  C) (Oral)  Resp 18  Ht 1.702 m (5' 7\")  Wt 86.2 kg (190 lb)  SpO2 98%  BMI 29.76 kg/m2  General: female appearing stated age, appearing uncomfortable, pacing   HENT: MMM, no oropharyngeal lesions  Eyes: PERRL, normal sclerae, no conjunctival pallor  Cardio: mildly tachycardic, regular rhythm, normal heart sounds, extremities well perfused  Resp: Normal work of breathing, clear breath sounds  Chest/Back: no visual signs of trauma, left CVA tenderness  Abdomen: no tenderness, non-distended, no rebound, no guarding  Neuro: alert and fully oriented. CN II-XII grossly intact. Grossly normal strength and sensation in all extremities.   MSK: no deformities.   Integumentary/Skin: no rash, normal color  Psych: normal affect, normal behavior    ED Course     ED Course     Procedures      "   Critical Care time:  none       Labs Ordered and Resulted from Time of ED Arrival Up to the Time of Departure from the ED   ROUTINE UA WITH MICROSCOPIC REFLEX TO CULTURE - Abnormal; Notable for the following:        Result Value    Leukocyte Esterase Urine Large (*)     WBC Urine 6 (*)     RBC Urine 6 (*)     Squamous Epithelial /HPF Urine 6 (*)     Mucous Urine Present (*)     All other components within normal limits   CBC WITH PLATELETS DIFFERENTIAL - Abnormal; Notable for the following:     WBC 12.7 (*)     Absolute Neutrophil 9.8 (*)     All other components within normal limits   COMPREHENSIVE METABOLIC PANEL - Abnormal; Notable for the following:     Chloride 110 (*)     Glucose 106 (*)     All other components within normal limits   HCG QUAL URINE POCT - Normal   LIPASE   PERIPHERAL IV CATHETER            Assessments & Plan (with Medical Decision Making)   Patient presenting with acute onset left flank pain shortly before arrival. Vitals in the ED notable for mild tachycardia. Initial differential diagnosis includes but not limited to nephrolithiasis, pyelonephritis, muscle strain, muscle spasm.     UA without evidence of UTI, only 6 WBCs, 6 RBCs.  There are squamous cells present, some evidence of some contamination.  Patient is currently taking cephalexin for UTI over the last 3 days.  Comp panel notable for normal kidney function, unremarkable electrolytes.  CBC with mild leukocytosis.  CT abdomen pelvis demonstrates kidney stone in the proximal left ureter with maximal diameter of 6 mm.  Patient was given ketorolac by EMS prior to arrival, received hydromorphone in the ED with improvement in symptoms upon reassessment.    The complete clinical picture is most consistent with left-sided kidney stone. After counseling on the diagnosis, work-up, and treatment plan, the patient was discharged to home. Short course of Norco, Flomax, urine strainer prescription provided. The patient was advised to follow-up  with Urology in one week. The patient was advised to return to the ED if worsening symptoms, fever, or if there are any urgent/life-threatening concerns.       Clinical Impression:  Left nephrolithiasis  Left acute flank pain       Isaac Marie MD  Emergency Medicine     This part of the document was transcribed by Juanita Dumont Scribe.      I have reviewed the nursing notes.    I have reviewed the findings, diagnosis, plan and need for follow up with the patient.    Discharge Medication List as of 10/16/2018  5:41 AM      START taking these medications    Details   HYDROcodone-acetaminophen (NORCO) 5-325 MG per tablet Take 1 tablet by mouth every 6 hours as needed for severe pain, Disp-15 tablet, R-0, Local Print      tamsulosin (FLOMAX) 0.4 MG capsule Take 1 capsule (0.4 mg) by mouth daily for 10 doses, Disp-10 capsule, R-0, Local Print             Final diagnoses:   Nephrolithiasis   Acute left flank pain       10/16/2018   [facility], [place], EMERGENCY DEPARTMENT     Isaac Marie MD  10/17/18 0255

## 2018-10-16 NOTE — LETTER
October 16, 2018      To Whom It May Concern:      Ayana Prasad was seen in our Emergency Department today, 10/16/18.  I expect her condition to improve over the next 5 or so days.  She may return to work/school when improved.    Sincerely,        Isaac Marie MD

## 2018-10-16 NOTE — DISCHARGE INSTRUCTIONS
Please make an appointment to follow up with Urology Clinic (phone: [phone]) in 7-10 days    Return to the ED if you are having fever, worsening symptoms, or any other urgent/life-threatening concerns.

## 2018-10-16 NOTE — ED AVS SNAPSHOT
[facility], [place], Emergency Department    [address] [place] [id number]    Phone:  [phone]                                       Ayana Prasad   MRN: [id number]    Department:  [facility], Emergency Department   Date of Visit:  10/16/2018           After Visit Summary Signature Page     I have received my discharge instructions, and my questions have been answered. I have discussed any challenges I see with this plan with the nurse or doctor.    ..........................................................................................................................................  Patient/Patient Representative Signature      ..........................................................................................................................................  Patient Representative Print Name and Relationship to Patient    ..................................................               ................................................  Date                                   Time    ..........................................................................................................................................  Reviewed by Signature/Title    ...................................................              ..............................................  Date                                               Time          22EPIC Rev 08/18

## 2018-10-16 NOTE — LETTER
[facility]  [address] [place] [id number]  [phone]      October 16, 2018    Ayana VINCENT Prasad  [address] [address]      Dear Ayana,    I am a clinic care coordinator who works with BROOKLYN Cruz CNP at the [facility] and I recently tried to call and was unable to reach you. I wanted to check to see how you are doing after your recent hospitalization and ED visit.      Please let me know if you need assistance with your MA application or any other issues going on.  I am here to assist you as needed.    Please feel free to contact me at [phone], with any questions or concerns. We at [facility] are focused on providing you with the highest-quality healthcare experience possible and that all starts with you.     Sincerely,     Nancy Anthony    Enclosed: I have enclosed a copy of a 24 Hour Access Plan. This has helpful phone numbers for you to call when needed. Please keep this in an easy to access place to use as needed.

## 2018-10-16 NOTE — PROGRESS NOTES
Clinic Care Coordination Contact  [facility]/Voicemail    Referral Source: ED to Admit Follow-Up and ED follow up from last night  Clinical Data: Care Coordinator Outreach  Outreach attempted x 1.  Left message on voicemail with call back information and requested return call.  Plan: Care Coordinator mailed out care coordination introduction letter on 10-16-18. Care Coordinator will try to reach patient again in 1-2 business days.    Nancy Jaramillo  Social Work Care Coordinator  [facility]  [phone]

## 2018-10-16 NOTE — ED AVS SNAPSHOT
[facility], Emergency Department    [address] [address]    Phone:  [phone]                                       Ayana Prasad   MRN: [id number]    Department:  [facility], Emergency Department   Date of Visit:  10/16/2018           Patient Information     Date Of Birth          1990        Your diagnoses for this visit were:     Nephrolithiasis     Acute left flank pain        You were seen by Isaac Marie MD.        Discharge Instructions       Please make an appointment to follow up with Urology Clinic (phone: [phone]) in 7-10 days    Return to the ED if you are having fever, worsening symptoms, or any other urgent/life-threatening concerns.       Your next 10 appointments already scheduled     Nov 15, 2018  1:00 PM CST   [facility] Urology Return with Kiran Ulrich MD   [facility]    [address] [address] [phone]              24 Hour Appointment Hotline       To make an appointment at any [facility], call [phone] ([phone]). If you don't have a family doctor or clinic, we will help you find one. [facility] are conveniently located to serve the needs of you and your family.          ED Discharge Orders     Strain urine                    Review of your medicines      START taking        Dose / Directions Last dose taken    HYDROcodone-acetaminophen 5-325 MG per tablet   Commonly known as:  NORCO   Dose:  1 tablet   Quantity:  15 tablet        Take 1 tablet by mouth every 6 hours as needed for severe pain   Refills:  0        tamsulosin 0.4 MG capsule   Commonly known as:  FLOMAX   Dose:  0.4 mg   Quantity:  10 capsule        Take 1 capsule (0.4 mg) by mouth daily for 10 doses   Refills:  0          Our records show that you are taking the medicines listed below. If these are incorrect, please call your family doctor or clinic.        Dose / Directions Last  dose taken    acetaminophen 325 MG tablet   Commonly known as:  TYLENOL   Dose:  650 mg   Quantity:  100 tablet        Take 2 tablets (650 mg) by mouth every 4 hours as needed for mild pain   Refills:  0        cephALEXin 500 MG capsule   Commonly known as:  KEFLEX   Dose:  500 mg   Quantity:  12 capsule        Take 1 capsule (500 mg) by mouth 2 times daily   Refills:  0        ibuprofen 800 MG tablet   Commonly known as:  ADVIL/MOTRIN   Dose:  800 mg        Take 800 mg by mouth 4 times daily   Refills:  0        tretinoin 0.05 % cream   Commonly known as:  RETIN-A        Apply topically At Bedtime   Refills:  0                Information about OPIOIDS     PRESCRIPTION OPIOIDS: WHAT YOU NEED TO KNOW   We gave you an opioid (narcotic) pain medicine. It is important to manage your pain, but opioids are not always the best choice. You should first try all the other options your care team gave you. Take this medicine for as short a time (and as few doses) as possible.    Some activities can increase your pain, such as bandage changes or therapy sessions. It may help to take your pain medicine 30 to 60 minutes before these activities. Reduce your stress by getting enough sleep, working on hobbies you enjoy and practicing relaxation or meditation. Talk to your care team about ways to manage your pain beyond prescription opioids.    These medicines have risks:    DO NOT drive when on new or higher doses of pain medicine. These medicines can affect your alertness and reaction times, and you could be arrested for driving under the influence (DUI). If you need to use opioids long-term, talk to your care team about driving.    DO NOT operate heavy machinery    DO NOT do any other dangerous activities while taking these medicines.    DO NOT drink any alcohol while taking these medicines.     If the opioid prescribed includes acetaminophen, DO NOT take with any other medicines that contain acetaminophen. Read all labels  carefully. Look for the word  acetaminophen  or  Tylenol.  Ask your pharmacist if you have questions or are unsure.    You can get addicted to pain medicines, especially if you have a history of addiction (chemical, alcohol or substance dependence). Talk to your care team about ways to reduce this risk.    All opioids tend to cause constipation. Drink plenty of water and eat foods that have a lot of fiber, such as fruits, vegetables, prune juice, apple juice and high-fiber cereal. Take a laxative (Miralax, milk of magnesia, Colace, Senna) if you don t move your bowels at least every other day. Other side effects include upset stomach, sleepiness, dizziness, throwing up, tolerance (needing more of the medicine to have the same effect), physical dependence and slowed breathing.    Store your pills in a secure place, locked if possible. We will not replace any lost or stolen medicine. If you don t finish your medicine, please throw away (dispose) as directed by your pharmacist. The [place] Pollution Control Agency has more information about safe disposal: https://[email or web address].[email or web address].[email or web address]/living-green/managing-unwanted-medications        Prescriptions were sent or printed at these locations (2 Prescriptions)                   Other Prescriptions                Printed at Department/Unit printer (2 of 2)         HYDROcodone-acetaminophen (NORCO) 5-325 MG per tablet               tamsulosin (FLOMAX) 0.4 MG capsule                Procedures and tests performed during your visit     CBC with platelets differential    CT Abdomen Pelvis w/o Contrast    Comprehensive metabolic panel    Lipase    Peripheral IV catheter    UA with Microscopic reflex to Culture    hCG qual urine POCT      Orders Needing Specimen Collection     None      Pending Results     Date and Time Order Name Status Description    10/16/2018 0354 CT Abdomen Pelvis w/o Contrast Preliminary             Pending Culture Results     No orders found from 10/14/2018  to 10/17/2018.            Pending Results Instructions     If you had any lab results that were not finalized at the time of your Discharge, you can call the ED Lab Result RN at [phone]. You will be contacted by this team for any positive Lab results or changes in treatment. The nurses are available 7 days a week from 10A to 6:30P.  You can leave a message 24 hours per day and they will return your call.        Thank you for choosing [facility]       Thank you for choosing [facility] for your care. Our goal is always to provide you with excellent care. Hearing back from our patients is one way we can continue to improve our services. Please take a few minutes to complete the written survey that you may receive in the mail after you visit with us. Thank you!        [employer] Information     [employer] gives you secure access to your electronic health record. If you see a primary care provider, you can also send messages to your care team and make appointments. If you have questions, please call your primary care clinic.  If you do not have a primary care provider, please call [phone] and they will assist you.        Care EveryWhere ID     This is your Care EveryWhere ID. This could be used by other organizations to access your [facility] medical records  [id number]        Equal Access to Services     PIPPA KOCH AH: Garfield Price, gogo augustine, tran beaulieu, fozia elizabeth. So [facility] [phone].    ATENCIÓN: Si habla español, tiene a xiong disposición servicios gratuitos de asistencia lingüística. Nishant al [phone].    We comply with applicable federal civil rights laws and [place] laws. We do not discriminate on the basis of race, color, national origin, age, disability, sex, sexual orientation, or gender identity.            After Visit Summary       This is your record. Keep this with you and show to your community pharmacist(s) and doctor(s) at your  next visit.

## 2018-10-16 NOTE — LETTER
Health Care Home - Access Care Plan    About Me:   Patient Name:  Ayana Prasad    YOB: 1990  Age:                           28 year old   Sylvia MRN:          [id number] Telephone Information:   Home Phone [phone]   Mobile [phone]   Home Phone [phone]       Address:  [address] [place] [address] Email address:  [email or web address]      Emergency Contact(s)  Name Relationship Lgl Grd Work Phone Home Phone Mobile Phone   1NANCY ANDERS Spouse  NONE [phone]             Health Maintenance:    Not reviewed today    My Access Plan  Medical Emergency 911   Questions or concerns during clinic hours Primary Clinic Line, I will call the clinic directly: [facility] - [phone]   24 Hour Appointment Line [phone] or  [phone]) (toll free)   24 Hour Nurse Line [phone] (toll free)   Questions or concerns outside clinic hours 24 Hour Appointment Line, I will call the after-hours on-call line:   [facility] [phone] or [phone]) (toll-free)   Preferred Urgent Care [facility], [phone]   Preferred Hospital [facility]  [phone]   Preferred Pharmacy [facility] [address]     Behavioral Health Crisis Line The National Suicide Prevention Lifeline at 1-730.154.9124 or 916         My Care Team Members  Patient Care Team       Relationship Specialty Notifications Start End    Becki Avery, BROOKLYN CNP PCP - General Nurse Practitioner - Family  11/29/17     Phone: [phone] Fax: [phone]         [facility] [address] [place] [address]    Senia VARGHESE worker   1/1/18     Comment:  [facility]    Phone: [phone]         Sheila Adams Therapist   3/6/18     Comment:  [facility]    Phone: [phone] Fax: [phone]        [facility] [address] [place] [address] Phone: [phone] Fax [phone]           My Medical and Care Information  Problem List   Patient Active Problem List   Diagnosis     Urolithiasis     ADHD (attention deficit hyperactivity disorder)     CARDIOVASCULAR SCREENING; LDL GOAL LESS THAN 160     Bipolar 1 disorder, manic, mild     Marijuana abuse     Polysubstance abuse (H)     GERD (gastroesophageal reflux disease)     Tobacco abuse     Health Care Home     Abdominal pain, right upper quadrant     Intractable back pain     Insomnia     Optic neuritis     Cauda equina syndrome with neurogenic bladder (H)     MENTAL HEALTH     Schizoaffective disorder, bipolar type (H)     PTSD (post-traumatic stress disorder)     Anxiety     Auditory hallucinations     Class 2 obesity due to excess calories in adult     Nephrolithiasis     Flank pain      Current Medications and Allergies:  See printed Medication Report

## 2018-10-16 NOTE — ED TRIAGE NOTES
Patient brought in via ambulance for left flank pain. Patient has a history of kidney stones and just had a ureteral stent removed on the left side on [place] 10/14/18 at [facility]. EMS gave patient 15 mg Toradol, 4 mg Zofran.

## 2018-10-18 ENCOUNTER — TELEPHONE (OUTPATIENT)
Dept: UROLOGY | Facility: CLINIC | Age: 28
End: 2018-10-18

## 2018-10-18 NOTE — TELEPHONE ENCOUNTER
Called patient, no answered. Left message HERIBERTO needed prior to seeing Dr. Ulrich on 11/15. Gave imaging number as well as call back number for questions.

## 2018-10-22 ENCOUNTER — HOSPITAL ENCOUNTER (OUTPATIENT)
Facility: CLINIC | Age: 28
Setting detail: OBSERVATION
Discharge: HOME | End: 2018-10-24
Attending: NURSE PRACTITIONER | Admitting: UROLOGY
Payer: MEDICAID

## 2018-10-22 ENCOUNTER — APPOINTMENT (OUTPATIENT)
Dept: CT IMAGING | Facility: CLINIC | Age: 28
End: 2018-10-22
Attending: NURSE PRACTITIONER
Payer: MEDICAID

## 2018-10-22 DIAGNOSIS — N20.0 CALCULUS OF KIDNEY: ICD-10-CM

## 2018-10-22 DIAGNOSIS — N20.0 NEPHROLITHIASIS: ICD-10-CM

## 2018-10-22 DIAGNOSIS — R10.9 FLANK PAIN: Primary | ICD-10-CM

## 2018-10-22 LAB
ALBUMIN UR-MCNC: 30 MG/DL
ANION GAP SERPL CALCULATED.3IONS-SCNC: 12 MMOL/L (ref 3–14)
APPEARANCE UR: ABNORMAL
BILIRUB UR QL STRIP: NEGATIVE
BUN SERPL-MCNC: 16 MG/DL (ref 7–30)
CALCIUM SERPL-MCNC: 9.3 MG/DL (ref 8.5–10.1)
CHLORIDE SERPL-SCNC: 105 MMOL/L (ref 94–109)
CO2 SERPL-SCNC: 22 MMOL/L (ref 20–32)
COLOR UR AUTO: YELLOW
CREAT SERPL-MCNC: 1.17 MG/DL (ref 0.52–1.04)
ERYTHROCYTE [DISTWIDTH] IN BLOOD BY AUTOMATED COUNT: 13.5 % (ref 10–15)
GFR SERPL CREATININE-BSD FRML MDRD: 55 ML/MIN/1.7M2
GLUCOSE SERPL-MCNC: 88 MG/DL (ref 70–99)
GLUCOSE UR STRIP-MCNC: NEGATIVE MG/DL
HCT VFR BLD AUTO: 43 % (ref 35–47)
HGB BLD-MCNC: 14.5 G/DL (ref 11.7–15.7)
HGB UR QL STRIP: ABNORMAL
KETONES UR STRIP-MCNC: 80 MG/DL
LACTATE BLD-SCNC: 1 MMOL/L (ref 0.7–2)
LEUKOCYTE ESTERASE UR QL STRIP: ABNORMAL
MCH RBC QN AUTO: 28.3 PG (ref 26.5–33)
MCHC RBC AUTO-ENTMCNC: 33.7 G/DL (ref 31.5–36.5)
MCV RBC AUTO: 84 FL (ref 78–100)
MUCOUS THREADS #/AREA URNS LPF: PRESENT /LPF
NITRATE UR QL: NEGATIVE
PH UR STRIP: 6 PH (ref 5–7)
PLATELET # BLD AUTO: 387 10E9/L (ref 150–450)
POTASSIUM SERPL-SCNC: 4.5 MMOL/L (ref 3.4–5.3)
RBC # BLD AUTO: 5.13 10E12/L (ref 3.8–5.2)
RBC #/AREA URNS AUTO: 71 /HPF (ref 0–2)
SODIUM SERPL-SCNC: 139 MMOL/L (ref 133–144)
SOURCE: ABNORMAL
SP GR UR STRIP: 1.03 (ref 1–1.03)
SQUAMOUS #/AREA URNS AUTO: 13 /HPF (ref 0–1)
UROBILINOGEN UR STRIP-MCNC: 0 MG/DL (ref 0–2)
WBC # BLD AUTO: 12.4 10E9/L (ref 4–11)
WBC #/AREA URNS AUTO: 8 /HPF (ref 0–5)

## 2018-10-22 PROCEDURE — G0378 HOSPITAL OBSERVATION PER HR: HCPCS

## 2018-10-22 PROCEDURE — 25000132 ZZH RX MED GY IP 250 OP 250 PS 637: Performed by: NURSE PRACTITIONER

## 2018-10-22 PROCEDURE — 81001 URINALYSIS AUTO W/SCOPE: CPT | Performed by: NURSE PRACTITIONER

## 2018-10-22 PROCEDURE — 99220 ZZC INITIAL OBSERVATION CARE,LEVL III: CPT | Performed by: PHYSICIAN ASSISTANT

## 2018-10-22 PROCEDURE — 96374 THER/PROPH/DIAG INJ IV PUSH: CPT

## 2018-10-22 PROCEDURE — 96375 TX/PRO/DX INJ NEW DRUG ADDON: CPT

## 2018-10-22 PROCEDURE — 25000132 ZZH RX MED GY IP 250 OP 250 PS 637: Performed by: PHYSICIAN ASSISTANT

## 2018-10-22 PROCEDURE — 36415 COLL VENOUS BLD VENIPUNCTURE: CPT | Performed by: PHYSICIAN ASSISTANT

## 2018-10-22 PROCEDURE — 96376 TX/PRO/DX INJ SAME DRUG ADON: CPT

## 2018-10-22 PROCEDURE — 99285 EMERGENCY DEPT VISIT HI MDM: CPT | Mod: 25

## 2018-10-22 PROCEDURE — 85027 COMPLETE CBC AUTOMATED: CPT | Performed by: NURSE PRACTITIONER

## 2018-10-22 PROCEDURE — 74176 CT ABD & PELVIS W/O CONTRAST: CPT

## 2018-10-22 PROCEDURE — 80048 BASIC METABOLIC PNL TOTAL CA: CPT | Performed by: NURSE PRACTITIONER

## 2018-10-22 PROCEDURE — 83605 ASSAY OF LACTIC ACID: CPT | Performed by: PHYSICIAN ASSISTANT

## 2018-10-22 PROCEDURE — 25000128 H RX IP 250 OP 636: Performed by: NURSE PRACTITIONER

## 2018-10-22 RX ORDER — OXYCODONE HYDROCHLORIDE 5 MG/1
5-10 TABLET ORAL
Status: DISCONTINUED | OUTPATIENT
Start: 2018-10-22 | End: 2018-10-24

## 2018-10-22 RX ORDER — NALOXONE HYDROCHLORIDE 0.4 MG/ML
.1-.4 INJECTION, SOLUTION INTRAMUSCULAR; INTRAVENOUS; SUBCUTANEOUS
Status: DISCONTINUED | OUTPATIENT
Start: 2018-10-22 | End: 2018-10-24 | Stop reason: HOSPADM

## 2018-10-22 RX ORDER — ACETAMINOPHEN 325 MG/1
650 TABLET ORAL EVERY 4 HOURS PRN
Status: DISCONTINUED | OUTPATIENT
Start: 2018-10-22 | End: 2018-10-24 | Stop reason: HOSPADM

## 2018-10-22 RX ORDER — ONDANSETRON 2 MG/ML
4 INJECTION INTRAMUSCULAR; INTRAVENOUS ONCE
Status: COMPLETED | OUTPATIENT
Start: 2018-10-22 | End: 2018-10-22

## 2018-10-22 RX ORDER — KETOROLAC TROMETHAMINE 15 MG/ML
15 INJECTION, SOLUTION INTRAMUSCULAR; INTRAVENOUS ONCE
Status: COMPLETED | OUTPATIENT
Start: 2018-10-22 | End: 2018-10-22

## 2018-10-22 RX ORDER — HYDROMORPHONE HYDROCHLORIDE 1 MG/ML
0.5 INJECTION, SOLUTION INTRAMUSCULAR; INTRAVENOUS; SUBCUTANEOUS
Status: DISCONTINUED | OUTPATIENT
Start: 2018-10-22 | End: 2018-10-24

## 2018-10-22 RX ORDER — HYDROMORPHONE HYDROCHLORIDE 1 MG/ML
1 INJECTION, SOLUTION INTRAMUSCULAR; INTRAVENOUS; SUBCUTANEOUS ONCE
Status: COMPLETED | OUTPATIENT
Start: 2018-10-22 | End: 2018-10-22

## 2018-10-22 RX ORDER — TRETINOIN 0.5 MG/G
CREAM TOPICAL DAILY
COMMUNITY
End: 2018-12-20

## 2018-10-22 RX ORDER — ONDANSETRON 4 MG/1
4 TABLET, ORALLY DISINTEGRATING ORAL EVERY 6 HOURS PRN
Status: DISCONTINUED | OUTPATIENT
Start: 2018-10-22 | End: 2018-10-24 | Stop reason: HOSPADM

## 2018-10-22 RX ORDER — SODIUM CHLORIDE 9 MG/ML
INJECTION, SOLUTION INTRAVENOUS CONTINUOUS
Status: DISCONTINUED | OUTPATIENT
Start: 2018-10-22 | End: 2018-10-24

## 2018-10-22 RX ORDER — AMOXICILLIN 250 MG
2 CAPSULE ORAL 2 TIMES DAILY PRN
Status: DISCONTINUED | OUTPATIENT
Start: 2018-10-22 | End: 2018-10-24 | Stop reason: HOSPADM

## 2018-10-22 RX ORDER — DIMENHYDRINATE 50 MG
50 TABLET ORAL ONCE
Status: COMPLETED | OUTPATIENT
Start: 2018-10-22 | End: 2018-10-22

## 2018-10-22 RX ORDER — LIDOCAINE 40 MG/G
CREAM TOPICAL
Status: DISCONTINUED | OUTPATIENT
Start: 2018-10-22 | End: 2018-10-24 | Stop reason: HOSPADM

## 2018-10-22 RX ORDER — OXYCODONE AND ACETAMINOPHEN 5; 325 MG/1; MG/1
1 TABLET ORAL ONCE
Status: COMPLETED | OUTPATIENT
Start: 2018-10-22 | End: 2018-10-22

## 2018-10-22 RX ORDER — HYDROMORPHONE HYDROCHLORIDE 1 MG/ML
0.5 INJECTION, SOLUTION INTRAMUSCULAR; INTRAVENOUS; SUBCUTANEOUS ONCE
Status: COMPLETED | OUTPATIENT
Start: 2018-10-22 | End: 2018-10-22

## 2018-10-22 RX ORDER — POLYETHYLENE GLYCOL 3350 17 G/17G
17 POWDER, FOR SOLUTION ORAL DAILY PRN
Status: DISCONTINUED | OUTPATIENT
Start: 2018-10-22 | End: 2018-10-24 | Stop reason: HOSPADM

## 2018-10-22 RX ORDER — ACETAMINOPHEN 650 MG/1
650 SUPPOSITORY RECTAL EVERY 4 HOURS PRN
Status: DISCONTINUED | OUTPATIENT
Start: 2018-10-22 | End: 2018-10-24 | Stop reason: HOSPADM

## 2018-10-22 RX ORDER — TAMSULOSIN HYDROCHLORIDE 0.4 MG/1
0.4 CAPSULE ORAL DAILY
Status: DISCONTINUED | OUTPATIENT
Start: 2018-10-22 | End: 2018-10-24 | Stop reason: HOSPADM

## 2018-10-22 RX ORDER — AMOXICILLIN 250 MG
1 CAPSULE ORAL 2 TIMES DAILY PRN
Status: DISCONTINUED | OUTPATIENT
Start: 2018-10-22 | End: 2018-10-24 | Stop reason: HOSPADM

## 2018-10-22 RX ORDER — ONDANSETRON 2 MG/ML
4 INJECTION INTRAMUSCULAR; INTRAVENOUS EVERY 6 HOURS PRN
Status: DISCONTINUED | OUTPATIENT
Start: 2018-10-22 | End: 2018-10-24 | Stop reason: HOSPADM

## 2018-10-22 RX ADMIN — KETOROLAC TROMETHAMINE 15 MG: 15 INJECTION, SOLUTION INTRAMUSCULAR; INTRAVENOUS at 19:01

## 2018-10-22 RX ADMIN — Medication 0.5 MG: at 21:53

## 2018-10-22 RX ADMIN — OXYCODONE HYDROCHLORIDE AND ACETAMINOPHEN 1 TABLET: 5; 325 TABLET ORAL at 21:53

## 2018-10-22 RX ADMIN — TAMSULOSIN HYDROCHLORIDE 0.4 MG: 0.4 CAPSULE ORAL at 23:39

## 2018-10-22 RX ADMIN — SODIUM CHLORIDE 1000 ML: 9 INJECTION, SOLUTION INTRAVENOUS at 18:59

## 2018-10-22 RX ADMIN — OXYCODONE HYDROCHLORIDE 5 MG: 5 TABLET ORAL at 23:59

## 2018-10-22 RX ADMIN — Medication 1 MG: at 19:03

## 2018-10-22 RX ADMIN — ONDANSETRON 4 MG: 2 INJECTION INTRAMUSCULAR; INTRAVENOUS at 19:00

## 2018-10-22 RX ADMIN — DIMENHYDRINATE 50 MG: 50 TABLET ORAL at 19:01

## 2018-10-22 RX ADMIN — Medication 1 MG: at 19:50

## 2018-10-22 ASSESSMENT — ENCOUNTER SYMPTOMS
BACK PAIN: 1
CHILLS: 0
ABDOMINAL PAIN: 1
VOMITING: 1
HEMATURIA: 0
DYSURIA: 0
FEVER: 0
NAUSEA: 1

## 2018-10-22 NOTE — IP AVS SNAPSHOT
MRN:[id number]                      After Visit Summary   10/22/2018    Ayana Prasad    MRN: [id number]           Thank you!     Thank you for choosing [facility] for your care. Our goal is always to provide you with excellent care. Hearing back from our patients is one way we can continue to improve our services. Please take a few minutes to complete the written survey that you may receive in the mail after you visit. If you would like to speak to someone directly about your visit please contact Patient Relations at [phone]. Thank you!          Patient Information     Date Of Birth          1990        About your hospital stay     You were admitted on:  October 22, 2018 You last received care in the:  [facility] Observation Department    You were discharged on:  October 24, 2018        Reason for your hospital stay       You were admitted for concerns of abdominal pain related to a kidney stone. You were treated with fluids, flomax and pain/nausea control as needed. You were seen by our urologists. They opted to take you to surgery for stone removal without stent placement. Your urine did not look infected so antibiotics were not prescribed. We will follow your urine culture and call you if changes need to be made.     We recommend you follow up with your Urologist at [facility] and discuss possible need for nephrology referral or to the stone clinic per Urology during this stay.                  Who to Call     For medical emergencies, please call [phone].  For non-urgent questions about your medical care, please call your primary care provider or clinic, [phone]  For questions related to your surgery, please call your surgery clinic        Attending Provider     Provider Specialty    Liu Martinez APRN CNP Family Practice    Jackson Patel MD Internal Medicine       Primary Care Provider Office Phone # Fax #    BROOKLYN Ford CNP  [phone]      After Care Instructions     Activity       Your activity upon discharge: activity as tolerated            Diet       Follow this diet upon discharge: Orders Placed This Encounter      Regular Diet Adult                  Follow-up Appointments     Follow-up and recommended labs and tests        Follow up with primary care provider, Becki Avery, within 7 days for hospital follow- up.  No follow up labs or test are needed.  Follow up with your Urologist and discuss need for referral to stone clinic.                  Your next 10 appointments already scheduled     Nov 15, 2018 12:30 PM CST   XR KUB with WYXR4   [facility] ([facility])    [address] [address]   [phone]           How do I prepare for my exam? (Food and drink instructions) No Food and Drink Restrictions.  How do I prepare for my exam? (Other instructions) You do not need to do anything special for this exam.  What should I wear: Wear comfortable clothes.  How long does the exam take: Most scans take less than 5 minutes.  What should I bring: Bring a list of your medicines, including vitamins, minerals and over-the-counter drugs. It is safest to leave personal items at home.  Do I need a :  No  is needed.  What do I need to tell my doctor: Tell your doctor if there s any chance you are pregnant.  What should I do after the exam: No restrictions, You may resume normal activities.  What is this test: An image of a specific body part shown in shades of black and white.  Who should I call with questions: If you have any questions, please call the Imaging Department where you will have your exam. Directions, parking instructions, and other information is available on our website, [email or web address].            Nov 15, 2018  1:00 PM CST   MyChart Urology Return with Kiran Ulrich MD   [facility]    [address]  [facility] [address]   [phone]                         Pending Results     Date and Time Order Name Status Description    10/24/2018 0806 Surgical pathology exam In process     10/23/2018 1728 Stone analysis In process             Statement of Approval     Ordered          10/24/18 1033  I have reviewed and agree with all the recommendations and orders detailed in this document.  EFFECTIVE NOW     Approved and electronically signed by:  Little Duckworth PA-C             Admission Information     Date & Time Provider Department Dept. Phone    10/22/2018 Jackson Patel MD [facility] Observation Department [phone]      Your Vitals Were     Blood Pressure Pulse Temperature Respirations Pulse Oximetry       110/59 (BP Location: Right arm) 60 98.6  F (37  C) (Oral) 16 97%       MyChart Information     MyChart gives you secure access to your electronic health record. If you see a primary care provider, you can also send messages to your care team and make appointments. If you have questions, please call your primary care clinic.  If you do not have a primary care provider, please call [phone] and they will assist you.        Care EveryWhere ID     This is your Care EveryWhere ID. This could be used by other organizations to access your [facility] medical records  [id number]        Equal Access to Services     PIPPA KOCH : Hadii dale abrahamo Soeloyali, waaxda luqadaha, qaybta kaalmada adeegyada, fozia elizabeth. So [facility] [phone].    ATENCIÓN: Si habla español, tiene a xiong disposición servicios gratuitos de asistencia lingüística. Llkylah al [phone].    We comply with applicable federal civil rights laws and [place] laws. We do not discriminate on the basis of race, color, national origin, age, disability, sex, sexual orientation, or gender identity.               Review of your medicines      START taking        Dose / Directions     ondansetron 4 MG ODT tab   Commonly known as:  ZOFRAN-ODT   Used for:  Nephrolithiasis        Dose:  4 mg   Take 1 tablet (4 mg) by mouth every 6 hours as needed for nausea or vomiting   Quantity:  20 tablet   Refills:  0       oxyCODONE IR 5 MG tablet   Commonly known as:  ROXICODONE   Used for:  Flank pain        Dose:  5 mg   Take 1 tablet (5 mg) by mouth every 6 hours as needed for severe pain   Quantity:  6 tablet   Refills:  0         CONTINUE these medicines which may have CHANGED, or have new prescriptions. If we are uncertain of the size of tablets/capsules you have at home, strength may be listed as something that might have changed.        Dose / Directions    acetaminophen 500 MG tablet   Commonly known as:  TYLENOL   This may have changed:    - medication strength  - how much to take   Used for:  Flank pain        Dose:  1000 mg   Take 2 tablets (1,000 mg) by mouth every 4 hours as needed for mild pain   Refills:  0         CONTINUE these medicines which have NOT CHANGED        Dose / Directions    cephALEXin 500 MG capsule   Commonly known as:  KEFLEX   Used for:  Urinary tract infection with hematuria, site unspecified        Dose:  500 mg   Take 1 capsule (500 mg) by mouth 2 times daily   Quantity:  12 capsule   Refills:  0       tamsulosin 0.4 MG capsule   Commonly known as:  FLOMAX        Dose:  0.4 mg   Take 1 capsule (0.4 mg) by mouth daily for 10 doses   Quantity:  10 capsule   Refills:  0       tretinoin 0.05 % cream   Commonly known as:  RETIN-A        Apply topically daily   Refills:  0         STOP taking     HYDROcodone-acetaminophen 5-325 MG per tablet   Commonly known as:  NORCO                Where to get your medicines      These medications were sent to [facility] [address] [address] [address]     Phone:  [phone]     ondansetron 4 MG ODT tab         Some of these will need a paper prescription and others  can be bought over the counter. Ask your nurse if you have questions.     Bring a paper prescription for each of these medications     oxyCODONE IR 5 MG tablet       You don't need a prescription for these medications     acetaminophen 500 MG tablet                Protect others around you: Learn how to safely use, store and throw away your medicines at www.disposemymeds.org.        Information about OPIOIDS     PRESCRIPTION OPIOIDS: WHAT YOU NEED TO KNOW   We gave you an opioid (narcotic) pain medicine. It is important to manage your pain, but opioids are not always the best choice. You should first try all the other options your care team gave you. Take this medicine for as short a time (and as few doses) as possible.    Some activities can increase your pain, such as bandage changes or therapy sessions. It may help to take your pain medicine 30 to 60 minutes before these activities. Reduce your stress by getting enough sleep, working on hobbies you enjoy and practicing relaxation or meditation. Talk to your care team about ways to manage your pain beyond prescription opioids.    These medicines have risks:    DO NOT drive when on new or higher doses of pain medicine. These medicines can affect your alertness and reaction times, and you could be arrested for driving under the influence (DUI). If you need to use opioids long-term, talk to your care team about driving.    DO NOT operate heavy machinery    DO NOT do any other dangerous activities while taking these medicines.    DO NOT drink any alcohol while taking these medicines.     If the opioid prescribed includes acetaminophen, DO NOT take with any other medicines that contain acetaminophen. Read all labels carefully. Look for the word  acetaminophen  or  Tylenol.  Ask your pharmacist if you have questions or are unsure.    You can get addicted to pain medicines, especially if you have a history of addiction (chemical, alcohol or substance dependence). Talk to  your care team about ways to reduce this risk.    All opioids tend to cause constipation. Drink plenty of water and eat foods that have a lot of fiber, such as fruits, vegetables, prune juice, apple juice and high-fiber cereal. Take a laxative (Miralax, milk of magnesia, Colace, Senna) if you don t move your bowels at least every other day. Other side effects include upset stomach, sleepiness, dizziness, throwing up, tolerance (needing more of the medicine to have the same effect), physical dependence and slowed breathing.    Store your pills in a secure place, locked if possible. We will not replace any lost or stolen medicine. If you don t finish your medicine, please throw away (dispose) as directed by your pharmacist. The [place] Pollution Control Agency has more information about safe disposal: https://www.pca.[facility].[place].us/living-green/managing-unwanted-medications             Medication List: This is a list of all your medications and when to take them. Check marks below indicate your daily home schedule. Keep this list as a reference.      Medications           Morning Afternoon Evening Bedtime As Needed    acetaminophen 500 MG tablet   Commonly known as:  TYLENOL   Take 2 tablets (1,000 mg) by mouth every 4 hours as needed for mild pain   Last time this was given:  650 mg on 10/24/2018  3:51 AM                                cephALEXin 500 MG capsule   Commonly known as:  KEFLEX   Take 1 capsule (500 mg) by mouth 2 times daily                                ondansetron 4 MG ODT tab   Commonly known as:  ZOFRAN-ODT   Take 1 tablet (4 mg) by mouth every 6 hours as needed for nausea or vomiting                                oxyCODONE IR 5 MG tablet   Commonly known as:  ROXICODONE   Take 1 tablet (5 mg) by mouth every 6 hours as needed for severe pain   Last time this was given:  10 mg on 10/24/2018 12:47 AM                                tamsulosin 0.4 MG capsule   Commonly known as:  FLOMAX   Take 1 capsule  (0.4 mg) by mouth daily for 10 doses   Last time this was given:  0.4 mg on 10/24/2018 10:33 AM                                tretinoin 0.05 % cream   Commonly known as:  RETIN-A   Apply topically daily

## 2018-10-22 NOTE — IP AVS SNAPSHOT
[facility] Observation Department    [address]    [facility] [address]    Phone:  [phone]                                       After Visit Summary   10/22/2018    Ayana Prasad    MRN: [id number]           After Visit Summary Signature Page     I have received my discharge instructions, and my questions have been answered. I have discussed any challenges I see with this plan with the nurse or doctor.    ..........................................................................................................................................  Patient/Patient Representative Signature      ..........................................................................................................................................  Patient Representative Print Name and Relationship to Patient    ..................................................               ................................................  Date                                   Time    ..........................................................................................................................................  Reviewed by Signature/Title    ...................................................              ..............................................  Date                                               Time          22EPIC Rev 08/18

## 2018-10-22 NOTE — ED TRIAGE NOTES
Presents to the ED with left flank pain. Reports had a ureteral stent taken out last Sunday due to infection and has been having flank pain since.

## 2018-10-22 NOTE — ED PROVIDER NOTES
History     Chief Complaint:  Flank Pain    HPI   Ayana Prasad is a 28 year old female who presents to the emergency department for evaluation of left flank pain and left LLQ pain. The patient recently had a ureteral stent removed last Sunday after she passed a kidney stone the day before. The next day she passed another stone. Then for the past 3 days she has had worsening vomiting, and pain. She localizes her pain to her back overlying her kidney, and the LLQ near her hip. The patient denies any fever, hematuria, or dysuria. She was at the [facility] on 10/16/18. Her urologist is Dr. Ulrich.       Allergies:  Adhesive Tape  Prednisone  Droperidol      Medications:    acetaminophen (TYLENOL) 325 MG tablet  cephALEXin (KEFLEX) 500 MG capsule  HYDROcodone-acetaminophen (NORCO) 5-325 MG per tablet  ibuprofen (ADVIL/MOTRIN) 800 MG tablet  tamsulosin (FLOMAX) 0.4 MG capsule  tretinoin (RETIN-A) 0.05 % cream    Past Medical History:    ADHD (attention deficit hyperactivity disorder)   Bipolar 1 disorder, manic, mild (H)   Cauda equina syndrome (H)   Chemical injury to cornea of left eye  Depressive disorder   GERD (gastroesophageal reflux disease)   Marginal corneal ulcer, left  Nephrolithiasis   Polysubstance abuse (H)     Past Surgical History:    Back surgery for cauda equina  Cystoscopy, retrogrades, exchange stent ureter  Cytoscopy, retrogrades, ureteroscopy, insert stent  Cystoscopy, ureteroscopy, combined  ENT surgery  EGD  Removal of kidney stones  Laparoscopic cholecystectomy  Lithotripsy  Transurethral stone resection    Family History:    Gastrointestinal disease  Liver cancer  Esophageal cancer  Lymphoma  Prostate cancer  Diabetes  Hypertension  Heart disease  Hyperlipidemia  Mental illness    Social History:  Marital Status:   [2]  Social History   Substance Use Topics     Smoking status: Former Smoker     Packs/day: 0.50     Years: 5.00     Types: Other     Start date: 8/1/2011     Smokeless  tobacco: Current User     Types: Chew      Comment: Smokes E-cig     Alcohol use No        Review of Systems   Constitutional: Negative for chills and fever.   Gastrointestinal: Positive for abdominal pain, nausea and vomiting.   Genitourinary: Negative for dysuria and hematuria.   Musculoskeletal: Positive for back pain.   All other systems reviewed and are negative.        Physical Exam   First Vitals:  BP: 117/79  Pulse: 108  Temp: 98.4  F (36.9  C)  Resp: 24  SpO2: 95 %      Physical Exam  General: Alert, Moderate discomfort, well kept  Eyes: PERRL, conjunctivae pink no scleral icterus or conjunctival injection  ENT:   Moist mucus membranes, posterior oropharynx clear without erythema or exudates, No lymphadenopathy, Normal voice  Resp:  Lungs clear to auscultation bilaterally, no crackles/rubs/wheezes. Good air movement  CV:  Normal rate and rhythm, no murmurs/rubs/gallops  GI:  Abdomen soft and non-distended.  Normoactive BS.  No tenderness, guarding or rebound, No masses. Left flank tenderness and LLQ tenderness  Skin:  Warm, dry.  No rashes or petechiae  Musculoskeletal: No peripheral edema or calf tenderness, Normal gross ROM   Neuro: Alert and oriented to person/place/time, normal sensation  Psychiatric: Normal affect, cooperative, good eye contact       Emergency Department Course   Imaging:  Radiology findings were communicated with the patient who voiced understanding of the findings.    Abd/pelvis CT no contrast - Stone Protocol  Preliminary Result  IMPRESSION:  1. Persistent obstructing calculus proximal left ureter which may have  migrated up to 1 cm more inferiorly. Left hydronephrosis and proximal  hydroureter are again seen.  2. Multiple tiny nonobstructing calculi renal collecting systems  bilaterally, significantly more numerous on the right than the left.  3. Fatty infiltration of the liver without change.  4. 1.8 cm left ovarian cyst.  Readings are preliminary at time of note      Laboratory:  Laboratory findings were communicated with the patient who voiced understanding of the findings.    UA: Ketone 80 (A), moderate blood (A), protein albumin 30 (A), moderate leuk esterase (A), RBC/HPF 71 (H), WBC/HPF 8 (H), squamous epithelial 13 (H), mucous present (A)  CBC: WBC 12.4 (H), HGB 14.5,   BMP: GFR 55 (L), o/w WNL (Creatinine 1.17 (H))    Interventions:  1901 dramamine 50 mg PO  1901 Toradol 15 mg IV   1900 Zofran 4 mg IV  1903 dilaudid 1 mg IV  1859 NS 1L IV Bolus   1950 dilaudid 1 mg IV  2153 Percocet 1 tablet PO  2153 dilaudid 0.5 mg IV    Emergency Department Course:  Nursing notes and vitals reviewed.  I performed an exam of the patient as documented above.   I discussed the treatment plan with the patient. They expressed understanding of this plan and consented to admission. I discussed the patient with Dr. Patel, who will admit the patient to a monitored bed for further evaluation and treatment.     I personally reviewed the imaging and lab results with the Patient and answered all related questions prior to admission.  Impression & Plan      Medical Decision Making:  .Ayana Prasad is a 28 year old female who presents today for evaluation of left flank pain.  Has history of kidney stones was seen recently diagnosed with kidney stone.  Due to increased discomfort and inability to keep her medication down due to nausea and vomiting she presented today for reevaluation.  She had a minimally elevated white cell count and slight increase of creatinine and decrease of GFR.  Her pain has been controlled by the above medications.  She has had minimal movement of her stone over the last couple of days due to this and continued discomfort plan will be to admit patient to the hospitalist for urology consult.  Her urinalysis is not consistent with infected stone at this time.  She is admitted to the observation unit.    Diagnosis:    ICD-10-CM    1. Calculus of kidney N20.0 UA reflex  to Microscopic     Disposition:   Admission    Scribe Disclosure:  I, Carlos Eng, am serving as a scribe at 9:59 PM on 10/22/2018 to document services personally performed by Liu Martinez APRN, based on my observations and the provider's statements to me.      [facility] EMERGENCY DEPARTMENT       Liu Martinez APRN CNP  10/22/18 [id number]

## 2018-10-23 ENCOUNTER — ANESTHESIA EVENT (OUTPATIENT)
Dept: SURGERY | Facility: CLINIC | Age: 28
End: 2018-10-23
Payer: MEDICAID

## 2018-10-23 ENCOUNTER — APPOINTMENT (OUTPATIENT)
Dept: GENERAL RADIOLOGY | Facility: CLINIC | Age: 28
End: 2018-10-23
Attending: UROLOGY
Payer: MEDICAID

## 2018-10-23 ENCOUNTER — ANESTHESIA (OUTPATIENT)
Dept: SURGERY | Facility: CLINIC | Age: 28
End: 2018-10-23
Payer: MEDICAID

## 2018-10-23 LAB
ANION GAP SERPL CALCULATED.3IONS-SCNC: 8 MMOL/L (ref 3–14)
BASOPHILS # BLD AUTO: 0.1 10E9/L (ref 0–0.2)
BASOPHILS NFR BLD AUTO: 0.7 %
BUN SERPL-MCNC: 18 MG/DL (ref 7–30)
CALCIUM SERPL-MCNC: 8.7 MG/DL (ref 8.5–10.1)
CHLORIDE SERPL-SCNC: 107 MMOL/L (ref 94–109)
CO2 SERPL-SCNC: 23 MMOL/L (ref 20–32)
CREAT SERPL-MCNC: 1.25 MG/DL (ref 0.52–1.04)
DIFFERENTIAL METHOD BLD: ABNORMAL
EOSINOPHIL # BLD AUTO: 0.2 10E9/L (ref 0–0.7)
EOSINOPHIL NFR BLD AUTO: 2 %
ERYTHROCYTE [DISTWIDTH] IN BLOOD BY AUTOMATED COUNT: 13.6 % (ref 10–15)
GFR SERPL CREATININE-BSD FRML MDRD: 51 ML/MIN/1.7M2
GLUCOSE SERPL-MCNC: 94 MG/DL (ref 70–99)
HCT VFR BLD AUTO: 41.5 % (ref 35–47)
HGB BLD-MCNC: 13.7 G/DL (ref 11.7–15.7)
IMM GRANULOCYTES # BLD: 0 10E9/L (ref 0–0.4)
IMM GRANULOCYTES NFR BLD: 0.3 %
LYMPHOCYTES # BLD AUTO: 4.4 10E9/L (ref 0.8–5.3)
LYMPHOCYTES NFR BLD AUTO: 37.4 %
MCH RBC QN AUTO: 28.5 PG (ref 26.5–33)
MCHC RBC AUTO-ENTMCNC: 33 G/DL (ref 31.5–36.5)
MCV RBC AUTO: 86 FL (ref 78–100)
MONOCYTES # BLD AUTO: 0.7 10E9/L (ref 0–1.3)
MONOCYTES NFR BLD AUTO: 6.2 %
NEUTROPHILS # BLD AUTO: 6.3 10E9/L (ref 1.6–8.3)
NEUTROPHILS NFR BLD AUTO: 53.4 %
NRBC # BLD AUTO: 0 10*3/UL
NRBC BLD AUTO-RTO: 0 /100
PLATELET # BLD AUTO: 311 10E9/L (ref 150–450)
POTASSIUM SERPL-SCNC: 3.9 MMOL/L (ref 3.4–5.3)
RBC # BLD AUTO: 4.81 10E12/L (ref 3.8–5.2)
SODIUM SERPL-SCNC: 138 MMOL/L (ref 133–144)
WBC # BLD AUTO: 11.8 10E9/L (ref 4–11)

## 2018-10-23 PROCEDURE — 25000128 H RX IP 250 OP 636: Performed by: UROLOGY

## 2018-10-23 PROCEDURE — 37000008 ZZH ANESTHESIA TECHNICAL FEE, 1ST 30 MIN: Performed by: UROLOGY

## 2018-10-23 PROCEDURE — 25000132 ZZH RX MED GY IP 250 OP 250 PS 637: Performed by: UROLOGY

## 2018-10-23 PROCEDURE — 88300 SURGICAL PATH GROSS: CPT | Mod: 26 | Performed by: HOSPITALIST

## 2018-10-23 PROCEDURE — 25000128 H RX IP 250 OP 636: Performed by: PHYSICIAN ASSISTANT

## 2018-10-23 PROCEDURE — 25000566 ZZH SEVOFLURANE, EA 15 MIN: Performed by: UROLOGY

## 2018-10-23 PROCEDURE — 36415 COLL VENOUS BLD VENIPUNCTURE: CPT | Performed by: PHYSICIAN ASSISTANT

## 2018-10-23 PROCEDURE — 25000132 ZZH RX MED GY IP 250 OP 250 PS 637: Performed by: PHYSICIAN ASSISTANT

## 2018-10-23 PROCEDURE — G0378 HOSPITAL OBSERVATION PER HR: HCPCS

## 2018-10-23 PROCEDURE — 99213 OFFICE O/P EST LOW 20 MIN: CPT | Mod: 57 | Performed by: UROLOGY

## 2018-10-23 PROCEDURE — 36000054 ZZH SURGERY LEVEL 2 W FLUORO 1ST 30 MIN: Performed by: UROLOGY

## 2018-10-23 PROCEDURE — 40000277 XR SURGERY CARM FLUORO LESS THAN 5 MIN W STILLS: Mod: TC

## 2018-10-23 PROCEDURE — 36000052 ZZH SURGERY LEVEL 2 EA 15 ADDTL MIN: Performed by: UROLOGY

## 2018-10-23 PROCEDURE — 25000128 H RX IP 250 OP 636: Performed by: ANESTHESIOLOGY

## 2018-10-23 PROCEDURE — 52352 CYSTOURETERO W/STONE REMOVE: CPT | Mod: LT | Performed by: UROLOGY

## 2018-10-23 PROCEDURE — 85025 COMPLETE CBC W/AUTO DIFF WBC: CPT | Performed by: PHYSICIAN ASSISTANT

## 2018-10-23 PROCEDURE — 25000132 ZZH RX MED GY IP 250 OP 250 PS 637: Performed by: INTERNAL MEDICINE

## 2018-10-23 PROCEDURE — 25000125 ZZHC RX 250: Performed by: ANESTHESIOLOGY

## 2018-10-23 PROCEDURE — 96376 TX/PRO/DX INJ SAME DRUG ADON: CPT | Mod: 59

## 2018-10-23 PROCEDURE — 82365 CALCULUS SPECTROSCOPY: CPT | Performed by: UROLOGY

## 2018-10-23 PROCEDURE — C1769 GUIDE WIRE: HCPCS | Performed by: UROLOGY

## 2018-10-23 PROCEDURE — 99207 ZZC CDG-CODE CATEGORY CHANGED: CPT | Performed by: PHYSICIAN ASSISTANT

## 2018-10-23 PROCEDURE — 99225 ZZC SUBSEQUENT OBSERVATION CARE,LEVEL II: CPT | Performed by: PHYSICIAN ASSISTANT

## 2018-10-23 PROCEDURE — 71000013 ZZH RECOVERY PHASE 1 LEVEL 1 EA ADDTL HR: Performed by: UROLOGY

## 2018-10-23 PROCEDURE — 71000012 ZZH RECOVERY PHASE 1 LEVEL 1 FIRST HR: Performed by: UROLOGY

## 2018-10-23 PROCEDURE — 88300 SURGICAL PATH GROSS: CPT | Performed by: HOSPITALIST

## 2018-10-23 PROCEDURE — 40000306 ZZH STATISTIC PRE PROC ASSESS II: Performed by: UROLOGY

## 2018-10-23 PROCEDURE — 37000009 ZZH ANESTHESIA TECHNICAL FEE, EACH ADDTL 15 MIN: Performed by: UROLOGY

## 2018-10-23 PROCEDURE — 80048 BASIC METABOLIC PNL TOTAL CA: CPT | Performed by: PHYSICIAN ASSISTANT

## 2018-10-23 PROCEDURE — 27210794 ZZH OR GENERAL SUPPLY STERILE: Performed by: UROLOGY

## 2018-10-23 RX ORDER — SODIUM CHLORIDE, SODIUM LACTATE, POTASSIUM CHLORIDE, CALCIUM CHLORIDE 600; 310; 30; 20 MG/100ML; MG/100ML; MG/100ML; MG/100ML
INJECTION, SOLUTION INTRAVENOUS CONTINUOUS
Status: DISCONTINUED | OUTPATIENT
Start: 2018-10-23 | End: 2018-10-23 | Stop reason: HOSPADM

## 2018-10-23 RX ORDER — CEFAZOLIN SODIUM 1 G/50ML
1 INJECTION, SOLUTION INTRAVENOUS SEE ADMIN INSTRUCTIONS
Status: DISCONTINUED | OUTPATIENT
Start: 2018-10-23 | End: 2018-10-24 | Stop reason: HOSPADM

## 2018-10-23 RX ORDER — FENTANYL CITRATE 50 UG/ML
INJECTION, SOLUTION INTRAMUSCULAR; INTRAVENOUS PRN
Status: DISCONTINUED | OUTPATIENT
Start: 2018-10-23 | End: 2018-10-23

## 2018-10-23 RX ORDER — LIDOCAINE 40 MG/G
CREAM TOPICAL
Status: DISCONTINUED | OUTPATIENT
Start: 2018-10-23 | End: 2018-10-23 | Stop reason: CLARIF

## 2018-10-23 RX ORDER — GLYCOPYRROLATE 0.2 MG/ML
INJECTION, SOLUTION INTRAMUSCULAR; INTRAVENOUS PRN
Status: DISCONTINUED | OUTPATIENT
Start: 2018-10-23 | End: 2018-10-23

## 2018-10-23 RX ORDER — KETOROLAC TROMETHAMINE 15 MG/ML
15 INJECTION, SOLUTION INTRAMUSCULAR; INTRAVENOUS ONCE
Status: COMPLETED | OUTPATIENT
Start: 2018-10-23 | End: 2018-10-23

## 2018-10-23 RX ORDER — NALOXONE HYDROCHLORIDE 0.4 MG/ML
.1-.4 INJECTION, SOLUTION INTRAMUSCULAR; INTRAVENOUS; SUBCUTANEOUS
Status: DISCONTINUED | OUTPATIENT
Start: 2018-10-23 | End: 2018-10-24 | Stop reason: HOSPADM

## 2018-10-23 RX ORDER — SODIUM CHLORIDE, SODIUM LACTATE, POTASSIUM CHLORIDE, CALCIUM CHLORIDE 600; 310; 30; 20 MG/100ML; MG/100ML; MG/100ML; MG/100ML
INJECTION, SOLUTION INTRAVENOUS CONTINUOUS PRN
Status: DISCONTINUED | OUTPATIENT
Start: 2018-10-23 | End: 2018-10-23

## 2018-10-23 RX ORDER — DIPHENHYDRAMINE HCL 25 MG
25 CAPSULE ORAL EVERY 6 HOURS PRN
Status: DISCONTINUED | OUTPATIENT
Start: 2018-10-23 | End: 2018-10-24 | Stop reason: HOSPADM

## 2018-10-23 RX ORDER — ONDANSETRON 2 MG/ML
4 INJECTION INTRAMUSCULAR; INTRAVENOUS EVERY 30 MIN PRN
Status: DISCONTINUED | OUTPATIENT
Start: 2018-10-23 | End: 2018-10-23 | Stop reason: HOSPADM

## 2018-10-23 RX ORDER — PROPOFOL 10 MG/ML
INJECTION, EMULSION INTRAVENOUS PRN
Status: DISCONTINUED | OUTPATIENT
Start: 2018-10-23 | End: 2018-10-23

## 2018-10-23 RX ORDER — LIDOCAINE HYDROCHLORIDE 10 MG/ML
INJECTION, SOLUTION INFILTRATION; PERINEURAL PRN
Status: DISCONTINUED | OUTPATIENT
Start: 2018-10-23 | End: 2018-10-23

## 2018-10-23 RX ORDER — LABETALOL HYDROCHLORIDE 5 MG/ML
10 INJECTION, SOLUTION INTRAVENOUS
Status: DISCONTINUED | OUTPATIENT
Start: 2018-10-23 | End: 2018-10-23 | Stop reason: HOSPADM

## 2018-10-23 RX ORDER — FENTANYL CITRATE 50 UG/ML
25-50 INJECTION, SOLUTION INTRAMUSCULAR; INTRAVENOUS
Status: DISCONTINUED | OUTPATIENT
Start: 2018-10-23 | End: 2018-10-23 | Stop reason: HOSPADM

## 2018-10-23 RX ORDER — DIPHENHYDRAMINE HYDROCHLORIDE 50 MG/ML
25 INJECTION INTRAMUSCULAR; INTRAVENOUS EVERY 6 HOURS PRN
Status: DISCONTINUED | OUTPATIENT
Start: 2018-10-23 | End: 2018-10-24 | Stop reason: HOSPADM

## 2018-10-23 RX ORDER — DEXAMETHASONE SODIUM PHOSPHATE 4 MG/ML
INJECTION, SOLUTION INTRA-ARTICULAR; INTRALESIONAL; INTRAMUSCULAR; INTRAVENOUS; SOFT TISSUE PRN
Status: DISCONTINUED | OUTPATIENT
Start: 2018-10-23 | End: 2018-10-23

## 2018-10-23 RX ORDER — CEFAZOLIN SODIUM 2 G/100ML
2 INJECTION, SOLUTION INTRAVENOUS
Status: COMPLETED | OUTPATIENT
Start: 2018-10-23 | End: 2018-10-23

## 2018-10-23 RX ORDER — ONDANSETRON 4 MG/1
4 TABLET, ORALLY DISINTEGRATING ORAL EVERY 30 MIN PRN
Status: DISCONTINUED | OUTPATIENT
Start: 2018-10-23 | End: 2018-10-23 | Stop reason: HOSPADM

## 2018-10-23 RX ORDER — SCOLOPAMINE TRANSDERMAL SYSTEM 1 MG/1
1 PATCH, EXTENDED RELEASE TRANSDERMAL
Status: DISCONTINUED | OUTPATIENT
Start: 2018-10-23 | End: 2018-10-24 | Stop reason: HOSPADM

## 2018-10-23 RX ORDER — ONDANSETRON 2 MG/ML
INJECTION INTRAMUSCULAR; INTRAVENOUS PRN
Status: DISCONTINUED | OUTPATIENT
Start: 2018-10-23 | End: 2018-10-23

## 2018-10-23 RX ORDER — CIPROFLOXACIN 500 MG/1
500 TABLET, FILM COATED ORAL ONCE
Status: COMPLETED | OUTPATIENT
Start: 2018-10-23 | End: 2018-10-23

## 2018-10-23 RX ADMIN — CEFAZOLIN SODIUM 2 G: 2 INJECTION, SOLUTION INTRAVENOUS at 17:05

## 2018-10-23 RX ADMIN — DIPHENHYDRAMINE HYDROCHLORIDE 25 MG: 25 CAPSULE ORAL at 23:49

## 2018-10-23 RX ADMIN — KETOROLAC TROMETHAMINE 15 MG: 15 INJECTION, SOLUTION INTRAMUSCULAR; INTRAVENOUS at 09:47

## 2018-10-23 RX ADMIN — FENTANYL CITRATE 50 MCG: 50 INJECTION INTRAMUSCULAR; INTRAVENOUS at 18:31

## 2018-10-23 RX ADMIN — DEXAMETHASONE SODIUM PHOSPHATE 4 MG: 4 INJECTION, SOLUTION INTRA-ARTICULAR; INTRALESIONAL; INTRAMUSCULAR; INTRAVENOUS; SOFT TISSUE at 17:12

## 2018-10-23 RX ADMIN — Medication 0.5 MG: at 17:56

## 2018-10-23 RX ADMIN — OXYCODONE HYDROCHLORIDE 10 MG: 5 TABLET ORAL at 13:15

## 2018-10-23 RX ADMIN — HYDROMORPHONE HYDROCHLORIDE 0.5 MG: 1 INJECTION, SOLUTION INTRAMUSCULAR; INTRAVENOUS; SUBCUTANEOUS at 19:02

## 2018-10-23 RX ADMIN — TAMSULOSIN HYDROCHLORIDE 0.4 MG: 0.4 CAPSULE ORAL at 07:49

## 2018-10-23 RX ADMIN — SODIUM CHLORIDE: 9 INJECTION, SOLUTION INTRAVENOUS at 01:27

## 2018-10-23 RX ADMIN — SCOPALAMINE 1 PATCH: 1 PATCH, EXTENDED RELEASE TRANSDERMAL at 17:03

## 2018-10-23 RX ADMIN — LIDOCAINE HYDROCHLORIDE 30 MG: 10 INJECTION, SOLUTION INFILTRATION; PERINEURAL at 17:12

## 2018-10-23 RX ADMIN — ONDANSETRON 4 MG: 2 INJECTION INTRAMUSCULAR; INTRAVENOUS at 17:22

## 2018-10-23 RX ADMIN — FENTANYL CITRATE 50 MCG: 50 INJECTION INTRAMUSCULAR; INTRAVENOUS at 18:28

## 2018-10-23 RX ADMIN — Medication 0.5 MG: at 18:11

## 2018-10-23 RX ADMIN — OXYCODONE HYDROCHLORIDE 10 MG: 5 TABLET ORAL at 21:46

## 2018-10-23 RX ADMIN — FENTANYL CITRATE 100 MCG: 50 INJECTION, SOLUTION INTRAMUSCULAR; INTRAVENOUS at 17:12

## 2018-10-23 RX ADMIN — HYDROMORPHONE HYDROCHLORIDE 0.5 MG: 1 INJECTION, SOLUTION INTRAMUSCULAR; INTRAVENOUS; SUBCUTANEOUS at 07:49

## 2018-10-23 RX ADMIN — FENTANYL CITRATE 50 MCG: 50 INJECTION INTRAMUSCULAR; INTRAVENOUS at 18:54

## 2018-10-23 RX ADMIN — FENTANYL CITRATE 50 MCG: 50 INJECTION INTRAMUSCULAR; INTRAVENOUS at 19:02

## 2018-10-23 RX ADMIN — Medication 10 MG: at 17:12

## 2018-10-23 RX ADMIN — HYDROMORPHONE HYDROCHLORIDE 0.5 MG: 1 INJECTION, SOLUTION INTRAMUSCULAR; INTRAVENOUS; SUBCUTANEOUS at 13:09

## 2018-10-23 RX ADMIN — GLYCOPYRROLATE 0.2 MG: 0.2 INJECTION, SOLUTION INTRAMUSCULAR; INTRAVENOUS at 17:12

## 2018-10-23 RX ADMIN — PROPOFOL 200 MG: 10 INJECTION, EMULSION INTRAVENOUS at 17:12

## 2018-10-23 RX ADMIN — CIPROFLOXACIN HYDROCHLORIDE 500 MG: 500 TABLET, FILM COATED ORAL at 21:30

## 2018-10-23 RX ADMIN — SODIUM CHLORIDE, SODIUM LACTATE, POTASSIUM CHLORIDE, CALCIUM CHLORIDE: 600; 310; 30; 20 INJECTION, SOLUTION INTRAVENOUS at 17:05

## 2018-10-23 RX ADMIN — OXYCODONE HYDROCHLORIDE 10 MG: 5 TABLET ORAL at 06:30

## 2018-10-23 ASSESSMENT — ENCOUNTER SYMPTOMS: DYSRHYTHMIAS: 0

## 2018-10-23 ASSESSMENT — LIFESTYLE VARIABLES: TOBACCO_USE: 1

## 2018-10-23 NOTE — PROGRESS NOTES
Patient's demographics show no insurance. Left message with Financial Counselor's office [phone]) to request follow up with patient re: insurance.     Vero Salmeron MA-RN  Care Transition Services  [phone]

## 2018-10-23 NOTE — PLAN OF CARE
Problem: Patient Care Overview  Goal: Plan of Care/Patient Progress Review  ROOM # 227      Living Situation (if not independent, order SW consult): Home with wife  [facility] name:  : [phone]    Activity level at baseline: Independent  Activity level on admit: Independent      Patient registered to observation; given Patient Bill of Rights; given the opportunity to ask questions about observation status and their plan of care.  Patient has been oriented to the observation room, bathroom and call light is in place.    Discussed discharge goals and expectations with patient/family.

## 2018-10-23 NOTE — OR NURSING
Dr. Mast and Dr. Triana made aware of pt's verbalization of NO chance of pregnancy as she has a wife; urine hcg not needed/refused; ok to proceed with procedure.

## 2018-10-23 NOTE — PLAN OF CARE
Problem: Patient Care Overview  Goal: Discharge Needs Assessment  PRIMARY DIAGNOSIS: ACUTE RENAL COLIC      OUTPATIENT/OBSERVATION GOALS TO BE MET BEFORE DISCHARGE   1. Pain Status: Improved-controlled with oral and IV pain medications.      2. Tolerating adequate PO diet: No, NPO      3. Surgical Intervention planned: N/A      4. Cleared by consultants (if involved): No, urology consulted      5. Return to near baseline physical activity: Yes, ind for activity      Discharge Planner Nurse   Safe discharge environment identified: Yes  Barriers to discharge: Yes  Please review provider order for any additional goals.   Nurse to notify provider when observation goals have been met and patient is ready for discharge.      Pt is feeling some relief with Oxy, IV Dilaudid, Toradol and Heat. Surgery scheduled for 7:20pm. Had lunch at 1115, ok'd by [place].  VSS.

## 2018-10-23 NOTE — PLAN OF CARE
Problem: Patient Care Overview  Goal: Plan of Care/Patient Progress Review  PRIMARY DIAGNOSIS: ACUTE RENAL COLIC    OUTPATIENT/OBSERVATION GOALS TO BE MET BEFORE DISCHARGE  1. Pain Status: Improved-controlled with oral pain medications. Pt rated sharp L) side pain 7/10, PRN oxycodone 5mg given for pain.    2. Tolerating adequate PO diet: Yes - diet changes to NPO at midnight    3. Surgical Intervention planned: N/A    4. Cleared by consultants (if involved): No, urology consulted    5. Return to near baseline physical activity: Yes, Ind for activity    Discharge Planner Nurse   Safe discharge environment identified: Yes  Barriers to discharge: Yes       Entered by: Kathleen Stoner 10/23/2018 12:01 AM     Please review provider order for any additional goals.   Nurse to notify provider when observation goals have been met and patient is ready for discharge.      Vitals are Temp: 98.5  F (36.9  C) Temp src: Oral BP: 128/74 Pulse: 76   Resp: 20 SpO2: 98 %    Pt A&Ox4, VSS, reports L) side pain 7/10. IVF running NS at 125mL/hr. Pt ind for activity, denies dizziness when standing or ambulating. Plan: strain urine, pain management, urology consulted, continue to monitor and provide supportive cares.

## 2018-10-23 NOTE — PROGRESS NOTES
[facility]    Observation Unit  Hospitalist Progress Note  Name: Ayana Prasad    MRN: [id number]  Provider:  Ayana Duckworth PA-C  Date of Service: 10/23/2018    Assessment & Plan   Summary of Stay: Ayana Prasad is a 28 year old female with a PMH significant for bipolar disorder, schizoaffective disorder, polysubstance abuse and recurrent kidney stones and had a stent removed 1 week ago who was admitted on 10/22/2018 for intractable left sided renal colic 2/2 7 mm left ureteral stone.     1. Intractable renal colic 2/2 left ureteral stone: progressively worsening left flank pain with associated nausea and vomiting since 10/19 with CT scan on admission showing 7 mm left ureteral stone with associated hydronephrosis. No signs of infected stone based on UA, no need for antibiotics. Urology consulted and will plan for stone removal this evening.   - NPO for procedure  - Plan for OR this evening with Dr. Mast of Urology for ureteroscopic stone extraction, laser lithotripsy, and possible stent placement   - Supportive care with IVFs, antiemetics, and pain control   - PO Flomax     2. YULISA: initial Cr of 1.17 with slight worsening today with Cr of 1.25, 2/2 to known obstructing stone and should resolve with treatment of this. Creatinine level normal at baseline.   - IVF hydration  - Recheck BMP in AM     DVT Prophylaxis: Ambulate every shift  Code Status: Full Code  Disposition: Expected discharge tomorrow     Interval History   Patient reports moderate improvement in pain today but still rating her pain as a 7/10. She reports increased pain after urinating. Denies F/C, N/V, abdominal pain, chest pain, or shortness of breath. Patient is asking to stay overnight due to recent recurrent admissions as well the later timing of her procedure today.     -Data reviewed today: I reviewed all new labs and imaging reports over the last 24 hours. I personally reviewed all labs and imaging from this visit     Physical  Exam   Temp: 97  F (36.1  C) Temp src: Oral BP: 130/82 Pulse: 85   Resp: 18 SpO2: 98 % O2 Device: None (Room air)    There were no vitals filed for this visit.  Vital Signs with Ranges  Temp:  [95.8  F (35.4  C)-98.5  F (36.9  C)] 97  F (36.1  C)  Pulse:  [] 85  Resp:  [16-24] 18  BP: (117-134)/(74-93) 130/82  SpO2:  [93 %-99 %] 98 %  I/O last 3 completed shifts:  In: 800 [I.V.:800]  Out: 375 [Urine:375]    GEN:  Alert, oriented x 3, appears comfortable, NAD.  HEENT:  Normocephalic/atraumatic, no scleral icterus, no nasal discharge, mouth moist.  CV:  Regular rate and rhythm, no murmur or JVD.  S1 + S2 noted, no S3 or S4.  LUNGS:  Clear to auscultation bilaterally without rales/rhonchi/wheezing/retractions.  Symmetric chest rise on inhalation noted.  ABD:  Active bowel sounds, soft, non-tender/non-distended.  No rebound/guarding/rigidity.  BACK: left CVA tenderness   EXT:  No edema.  No cyanosis.  No acute joint synovitis noted.  SKIN:  Dry to touch, no exanthems noted in the visualized areas.    Medications     sodium chloride 125 mL/hr at 10/23/18 0127       sodium chloride (PF)  3 mL Intracatheter Q8H     tamsulosin  0.4 mg Oral Daily     Data   Results for orders placed or performed during the hospital encounter of 10/22/18   Abd/pelvis CT no contrast - Stone Protocol    Narrative    CT ABDOMEN AND PELVIS WITHOUT CONTRAST 10/22/2018 8:08 PM    HISTORY: Left flank pain. History of kidney stones.    TECHNIQUE: Helical axial scans from the dome of liver through the  pubic symphysis without contrast. Radiation dose for this scan was  reduced using automated exposure control, adjustment of the mA and/or  kV according to patient size, or iterative reconstruction technique.    COMPARISON: 10/16/2018.    FINDINGS: Again seen is an obstructing calculus in the proximal left  ureter measuring at least 7 mm with a density of 854 Hounsfield units.  This is probably 1 cm more inferior in the ureter than on the  prior  exam. Again seen is hydronephrosis and hydroureter proximal to the  obstructing calculus. Numerous small nonobstructing calculi are seen  scattered throughout the right renal collecting system. A few tiny  nonobstructing calculi may be present in the left renal collecting  system but this is uncertain. No evidence for right-sided urinary  tract obstruction.    Moderate diffuse fatty infiltration of the liver and prior  cholecystectomy again noted. The spleen, pancreas, bilateral adrenal  glands and remainder of the kidneys bilaterally are unremarkable. The  bowel and mesentery in the upper abdomen appear normal. There is a  retrocecal appendix with no evidence for appendicitis.    Scans through the pelvis show no acute abnormality or free fluid.  There is a 1.8 cm left ovarian cyst, more conspicuous than on the  prior exam.      Impression    IMPRESSION:  1. Persistent obstructing calculus proximal left ureter which may have  migrated up to 1 cm more inferiorly. Left hydronephrosis and proximal  hydroureter are again seen.  2. Multiple tiny nonobstructing calculi renal collecting systems  bilaterally, significantly more numerous on the right than the left.  3. Fatty infiltration of the liver without change.  4. 1.8 cm left ovarian cyst.    ALEXANDRA PATRICK MD   CBC (platelets, no diff)   Result Value Ref Range    WBC 12.4 (H) 4.0 - 11.0 10e9/L    RBC Count 5.13 3.8 - 5.2 10e12/L    Hemoglobin 14.5 11.7 - 15.7 g/dL    Hematocrit 43.0 35.0 - 47.0 %    MCV 84 78 - 100 fl    MCH 28.3 26.5 - 33.0 pg    MCHC 33.7 31.5 - 36.5 g/dL    RDW 13.5 10.0 - 15.0 %    Platelet Count 387 150 - 450 10e9/L   Basic metabolic panel   Result Value Ref Range    Sodium 139 133 - 144 mmol/L    Potassium 4.5 3.4 - 5.3 mmol/L    Chloride 105 94 - 109 mmol/L    Carbon Dioxide 22 20 - 32 mmol/L    Anion Gap 12 3 - 14 mmol/L    Glucose 88 70 - 99 mg/dL    Urea Nitrogen 16 7 - 30 mg/dL    Creatinine 1.17 (H) 0.52 - 1.04 mg/dL    GFR  Estimate 55 (L) >60 mL/min/1.7m2    GFR Estimate If Black 67 >60 mL/min/1.7m2    Calcium 9.3 8.5 - 10.1 mg/dL   UA reflex to Microscopic   Result Value Ref Range    Color Urine Yellow     Appearance Urine Slightly Cloudy     Glucose Urine Negative NEG^Negative mg/dL    Bilirubin Urine Negative NEG^Negative    Ketones Urine 80 (A) NEG^Negative mg/dL    Specific Gravity Urine 1.026 1.003 - 1.035    Blood Urine Moderate (A) NEG^Negative    pH Urine 6.0 5.0 - 7.0 pH    Protein Albumin Urine 30 (A) NEG^Negative mg/dL    Urobilinogen mg/dL 0.0 0.0 - 2.0 mg/dL    Nitrite Urine Negative NEG^Negative    Leukocyte Esterase Urine Moderate (A) NEG^Negative    Source Midstream Urine     RBC Urine 71 (H) 0 - 2 /HPF    WBC Urine 8 (H) 0 - 5 /HPF    Squamous Epithelial /HPF Urine 13 (H) 0 - 1 /HPF    Mucous Urine Present (A) NEG^Negative /LPF   Lactic acid level STAT for sepsis protocol   Result Value Ref Range    Lactate for Sepsis Protocol 1.0 0.7 - 2.0 mmol/L   Basic metabolic panel   Result Value Ref Range    Sodium 138 133 - 144 mmol/L    Potassium 3.9 3.4 - 5.3 mmol/L    Chloride 107 94 - 109 mmol/L    Carbon Dioxide 23 20 - 32 mmol/L    Anion Gap 8 3 - 14 mmol/L    Glucose 94 70 - 99 mg/dL    Urea Nitrogen 18 7 - 30 mg/dL    Creatinine 1.25 (H) 0.52 - 1.04 mg/dL    GFR Estimate 51 (L) >60 mL/min/1.7m2    GFR Estimate If Black 62 >60 mL/min/1.7m2    Calcium 8.7 8.5 - 10.1 mg/dL   CBC with platelets differential   Result Value Ref Range    WBC 11.8 (H) 4.0 - 11.0 10e9/L    RBC Count 4.81 3.8 - 5.2 10e12/L    Hemoglobin 13.7 11.7 - 15.7 g/dL    Hematocrit 41.5 35.0 - 47.0 %    MCV 86 78 - 100 fl    MCH 28.5 26.5 - 33.0 pg    MCHC 33.0 31.5 - 36.5 g/dL    RDW 13.6 10.0 - 15.0 %    Platelet Count 311 150 - 450 10e9/L    Diff Method Automated Method     % Neutrophils 53.4 %    % Lymphocytes 37.4 %    % Monocytes 6.2 %    % Eosinophils 2.0 %    % Basophils 0.7 %    % Immature Granulocytes 0.3 %    Nucleated RBCs 0 0 /100     Absolute Neutrophil 6.3 1.6 - 8.3 10e9/L    Absolute Lymphocytes 4.4 0.8 - 5.3 10e9/L    Absolute Monocytes 0.7 0.0 - 1.3 10e9/L    Absolute Eosinophils 0.2 0.0 - 0.7 10e9/L    Absolute Basophils 0.1 0.0 - 0.2 10e9/L    Abs Immature Granulocytes 0.0 0 - 0.4 10e9/L    Absolute Nucleated RBC 0.0      Little Duckworth PA-C

## 2018-10-23 NOTE — ANESTHESIA PREPROCEDURE EVALUATION
PAC NOTE:       ANESTHESIA PRE EVALUATION:  Anesthesia Evaluation     .             ROS/MED HX    ENT/Pulmonary:     (+)tobacco use, Current use , . .   (-) sleep apnea   Neurologic:     (+)neuropathy - cauda equina syndrome, other neuro ADHD   (-) TIA   Cardiovascular:        (-) hypertension, CAD, CHF, arrhythmias, pulmonary hypertension and dyslipidemia   METS/Exercise Tolerance:     Hematologic:        (-) anemia   Musculoskeletal:   (+) arthritis, , , -       GI/Hepatic:     (+) GERD Asymptomatic on medication,      (-) hiatal hernia and hepatitis   Renal/Genitourinary:     (+) Nephrolithiasis ,    (-) renal disease   Endo:     (+) Obesity, .   (-) Type I DM, Type II DM, thyroid disease, chronic steroid usage and other endocrine disorder   Psychiatric: Comment: PTSD, Hx Polysubstance abuse    (+) psychiatric history bipolar and schizophrenia      Infectious Disease:  - neg infectious disease ROS       Malignancy:         Other:    (+) H/O Chronic Pain,                   Physical Exam      Airway   Mallampati: II  TM distance: >3 FB  Neck ROM: full    Dental     Cardiovascular   Rhythm and rate: regular and normal  (-) no murmur    Pulmonary    breath sounds clear to auscultation    Other findings: Lab Test        10/23/18     10/22/18     10/16/18      --          01/06/14                       0634          1813          0353           --           2000          WBC          11.8*        12.4*        12.7*          < >        11.0          HGB          13.7         14.5         13.8           < >        13.4          MCV          86           84           87             < >        85            PLT          311          387          314            < >        300           INR           --           --           --           --          1.18*          < > = values in this interval not displayed.                  Lab Test        10/23/18     10/22/18     10/16/18                       0634          1813           0353          NA           138          139          142           POTASSIUM    3.9          4.5          3.9           CHLORIDE     107          105          110*          CO2          23           22           23            BUN          18           16           9             CR           1.25*        1.17*        0.89          ANIONGAP     8            12           9             WILLIAMS          8.7          9.3          8.9           GLC          94           88           106*                 Anesthesia Plan      History & Physical Review  History and physical reviewed and following examination; no interval change.    ASA Status:  2 .    NPO Status:  > 8 hours    Plan for General, ETT and RSI with Propofol induction. Maintenance will be Balanced.    PONV prophylaxis:  Ondansetron (or other 5HT-3)  Avoid Decadron and Phenergan/Compazine etc      Postoperative Care  Postoperative pain management:  IV analgesics and Oral pain medications.      Consents  Anesthetic plan, risks, benefits and alternatives discussed with:  Patient..                            .

## 2018-10-23 NOTE — PLAN OF CARE
Problem: Patient Care Overview  Goal: Plan of Care/Patient Progress Review  PRIMARY DIAGNOSIS: ACUTE RENAL COLIC    OUTPATIENT/OBSERVATION GOALS TO BE MET BEFORE DISCHARGE   1. Pain Status: Improved-controlled with oral pain medications.    2. Tolerating adequate PO diet: No, NPO    3. Surgical Intervention planned: N/A    4. Cleared by consultants (if involved): No, urology consulted    5. Return to near baseline physical activity: Yes, ind for activity    Discharge Planner Nurse   Safe discharge environment identified: Yes  Barriers to discharge: Yes       Entered by: Kathleen Stoner 10/23/2018 4:54 AM     Please review provider order for any additional goals.   Nurse to notify provider when observation goals have been met and patient is ready for discharge.      Vitals are Temp: 95.8  F (35.4  C) Temp src: Oral BP: 134/76 Pulse: 69   Resp: 16 SpO2: 99 %  Plan: IVF running NS 125mL/hr, strain urine, pain management.

## 2018-10-23 NOTE — ED NOTES
[facility]  ED Nurse Handoff Report    Ayana Prasad is a 28 year old female   ED Chief complaint: Flank Pain  . ED Diagnosis:   Final diagnoses:   Calculus of kidney     Allergies:   Allergies   Allergen Reactions     Adhesive Tape Hives     Prednisone Other (See Comments) and Hives     Suicidal ideation     Droperidol Anxiety       Code Status: Full Code  Activity level - Baseline/Home:  Independent. Activity Level - Current:   Independent. Lift room needed: No. Bariatric: No   Needed: No   Isolation: No. Infection: Not Applicable.     Vital Signs:   Vitals:    10/22/18 1743 10/22/18 1915 10/22/18 1930 10/22/18 2200   BP: 117/79 (!) 130/93  127/84   Pulse: 108      Resp: 24      Temp: 98.4  F (36.9  C)      SpO2: 95% 99% 99% 93%       Cardiac Rhythm:  ,      Pain level: 0-10 Pain Scale: 7  Patient confused: No. Patient Falls Risk: Yes (just because she is on narcotics)  Elimination Status: Has voided   Patient Report - Initial Complaint: flank pain. Focused Assessment:  Gastrointestinal Gastrointestinal - GI WDL:  WDL except; GI symptoms GI Signs/Symptoms: nausea; vomiting Gastrointestinal Comment: Pt states having nausea/vomiting for past 3 days. Pt states she has not been able to keep anything down, especially her pain med norco to help with her kidney stones.      18:15 Genitourinary Genitourinary - Genitourinary WDL:  WDL except; general symptoms Signs/Symptoms: flank pain Genitourinary Comment: Pt states having left sided flank pain that has been present for past 3 days. States that she has been straining urine and has not passed any stones. Pt was recently admitted for kidney stones and states she was fine earlier last week but then she started to not feel well again. Denies any blood in urine or pain with urination.     Pt has been able to tolerate apple juice and water. Pain has improved with medication.  Tests Performed: lab work, CT scan. Abnormal Results:   Labs Ordered and  Resulted from Time of ED Arrival Up to the Time of Departure from the ED   CBC WITH PLATELETS - Abnormal; Notable for the following:        Result Value    WBC 12.4 (*)     All other components within normal limits   BASIC METABOLIC PANEL - Abnormal; Notable for the following:     Creatinine 1.17 (*)     GFR Estimate 55 (*)     All other components within normal limits   URINE MACROSCOPIC WITH REFLEX TO MICRO - Abnormal; Notable for the following:     Ketones Urine 80 (*)     Blood Urine Moderate (*)     Protein Albumin Urine 30 (*)     Leukocyte Esterase Urine Moderate (*)     RBC Urine 71 (*)     WBC Urine 8 (*)     Squamous Epithelial /HPF Urine 13 (*)     Mucous Urine Present (*)     All other components within normal limits   .   Treatments provided: NS bolus, dilaudid 2.5 mg, zofran 4 mg x 2, percocet  Family Comments: Pt currently here by herself  OBS brochure/video discussed/provided to patient:  N/A  ED Medications:   Medications   dimenhyDRINATE (DRAMAMINE) tablet 50 mg (50 mg Oral Given 10/22/18 1901)   ketorolac (TORADOL) injection 15 mg (15 mg Intravenous Given 10/22/18 1901)   ondansetron (ZOFRAN) injection 4 mg (4 mg Intravenous Given 10/22/18 1900)   HYDROmorphone (PF) (DILAUDID) injection 1 mg (1 mg Intravenous Given 10/22/18 1903)   0.9% sodium chloride BOLUS (1,000 mLs Intravenous New Bag 10/22/18 1859)   HYDROmorphone (PF) (DILAUDID) injection 1 mg (1 mg Intravenous Given 10/22/18 1950)   oxyCODONE-acetaminophen (PERCOCET) 5-325 MG per tablet 1 tablet (1 tablet Oral Given 10/22/18 2153)   HYDROmorphone (PF) (DILAUDID) injection 0.5 mg (0.5 mg Intravenous Given 10/22/18 2153)     Drips infusing:  Yes  For the majority of the shift, the patient's behavior Green. Interventions performed were n/a.     Severe Sepsis OR Septic Shock Diagnosis Present: No      ED Nurse Name/Phone Number: Sunni Minor,   10:08 PM    RECEIVING UNIT ED HANDOFF REVIEW    Above ED Nurse Handoff Report was reviewed:  Yes  Reviewed by: Kat Howard on October 22, 2018 at 10:33 PM

## 2018-10-23 NOTE — ANESTHESIA CARE TRANSFER NOTE
Patient: Ayaan Prasad    Procedure(s):  COMBINED CYSTOSCOPY, URETEROSCOPY, INSERT STENT    Diagnosis: unknown  Diagnosis Additional Information: No value filed.    Anesthesia Type:   General, ETT, RSI     Note:  Airway :Face Mask  Patient transferred to:PACU  Comments: To PACU, adequate gas exchange, report to RN.Handoff Report: Identifed the Patient, Identified the Reponsible Provider, Reviewed the pertinent medical history, Discussed the surgical course, Reviewed Intra-OP anesthesia mangement and issues during anesthesia, Set expectations for post-procedure period and Allowed opportunity for questions and acknowledgement of understanding      Vitals: (Last set prior to Anesthesia Care Transfer)    CRNA VITALS  10/23/2018 1709 - 10/23/2018 1745      10/23/2018             EKG: Sinus rhythm                Electronically Signed By: BROOKLYN Kumar CRNA  October 23, 2018  5:45 PM

## 2018-10-23 NOTE — PLAN OF CARE
Problem: Patient Care Overview  Goal: Discharge Needs Assessment  PRIMARY DIAGNOSIS: ACUTE RENAL COLIC     OUTPATIENT/OBSERVATION GOALS TO BE MET BEFORE DISCHARGE   1. Pain Status: Improved-controlled with oral pain medications.     2. Tolerating adequate PO diet: No, NPO     3. Surgical Intervention planned: N/A     4. Cleared by consultants (if involved): No, urology consulted     5. Return to near baseline physical activity: Yes, ind for activity     Discharge Planner Nurse   Safe discharge environment identified: Yes  Barriers to discharge: Yes      Please review provider order for any additional goals.   Nurse to notify provider when observation goals have been met and patient is ready for discharge.     Pt is painful 8/10 Left lower back/flank after Oxy, IV Dilaudid given. Requesting T pump. Urology consult. Nothing on surgical schedule yet. VSS. Denies nausea, but states she has been vomiting and diarrhea for pervious 2-3 days.

## 2018-10-23 NOTE — CONSULTS
Consult Date:  10/23/2018      HISTORY OF PRESENT ILLNESS:  The patient is a 28-year-old female, with a history of calcium and uric acid nephrolithiasis.  She underwent stent removal by Dr. Frias ten days ago and is now admitted through the emergency room late last night with left flank pain.  She has a 7 mm stone in the proximal left ureter, with several smaller stones in the kidneys bilaterally.  Laboratory studies reveal normal electrolytes, creatinine 1.25, slightly elevated white count, and normal hemoglobin and platelets.  Urinalysis shows a pH of 6.0 and a concentrated urine.  There are a few white cells and negative nitrite.  The patient has been afebrile.      OTHER PAST MEDICAL HISTORY:  Bipolar disorder, schizophrenia, and polysubstance abuse.      PHYSICAL EXAMINATION:  On exam, the patient is alert and oriented and normal vital signs.      ASSESSMENT:  A 7 mm proximal left ureteral calculus, previously stented at the [facility].  The patient has had metabolic stone evaluation with Dr. Jett in the past.  She will be following up with Dr. Ulrich at the [facility] and should be referred to the Nephrology Clinic there as planned.      PLAN:   1.  N.p.o.   2.  Ureteroscopic stone extraction later tonight, laser lithotripsy, and possible left double-J stent.  The procedure, alternatives, risks, and follow-up were carefully discussed with the patient.         BLANK AGUSTIN JR, MD             D: 10/23/2018   T: 10/23/2018   MT: DIONNE      Name:     SOMMER GARCIA   MRN:      [id number]        Account:       [id number]   :      1990           Consult Date:  10/23/2018      Document: [id number]       cc: NELLY FOURNIER MD

## 2018-10-23 NOTE — BRIEF OP NOTE
[facility] Brief Operative Note    Pre-operative diagnosis: Left ureteral calculus   Post-operative diagnosis left ureteral calculus     Procedure: Procedure(s):  COMBINED CYSTOSCOPY, URETEROSCOPY, INSERT STENT   Surgeon(s): Surgeon(s) and Role:  Bull Mast MD - Primary   Estimated blood loss: 0cc    Specimens:   ID Type Source Tests Collected by Time Destination   1 : Left ureteral stone Calculus/Stone Ureter, Left STONE ANALYSIS Bull Mast MD 10/23/2018  5:28 PM       Findings:

## 2018-10-23 NOTE — PHARMACY-ADMISSION MEDICATION HISTORY
Admission medication history interview status for this patient is complete. See [facility] admission navigator for allergy information, prior to admission medications and immunization status.     Medication history interview source(s):Patient  Medication history resources (including written lists, pill bottles, clinic record):None  Primary pharmacy:  No preferred pharmacy    Changes made to PTA medication list:  Added: n/a  Deleted: ibuprofen  Changed: n/a    Actions taken by pharmacist (provider contacted, etc):None     Additional medication history information:None    Medication reconciliation/reorder completed by provider prior to medication history? Yes    Do you take OTC medications (eg tylenol, ibuprofen, fish oil, eye/ear drops, etc)? N(Y/N)    For patients on insulin therapy: N (Y/N)    Prior to Admission medications    Medication Sig Last Dose Taking? Auth Provider   acetaminophen (TYLENOL) 325 MG tablet Take 2 tablets (650 mg) by mouth every 4 hours as needed for mild pain Past Week at Unknown time Yes Little Duckworth PA-C   cephALEXin (KEFLEX) 500 MG capsule Take 1 capsule (500 mg) by mouth 2 times daily 10/21/2018 at completed 10/21 Yes Little Duckworth PA-C   HYDROcodone-acetaminophen (NORCO) 5-325 MG per tablet Take 1 tablet by mouth every 6 hours as needed for severe pain 10/21/2018 at Unknown time Yes Isaac Marie MD   tamsulosin (FLOMAX) 0.4 MG capsule Take 1 capsule (0.4 mg) by mouth daily for 10 doses 10/21/2018 at am Yes Isaac Marie MD   tretinoin (RETIN-A) 0.05 % cream Apply topically daily Past Week at Unknown time Yes Unknown, Entered By History
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
## 2018-10-24 VITALS
OXYGEN SATURATION: 97 % | RESPIRATION RATE: 16 BRPM | TEMPERATURE: 98.6 F | DIASTOLIC BLOOD PRESSURE: 59 MMHG | HEART RATE: 60 BPM | SYSTOLIC BLOOD PRESSURE: 110 MMHG

## 2018-10-24 LAB
ANION GAP SERPL CALCULATED.3IONS-SCNC: 8 MMOL/L (ref 3–14)
BUN SERPL-MCNC: 13 MG/DL (ref 7–30)
CALCIUM SERPL-MCNC: 8.7 MG/DL (ref 8.5–10.1)
CHLORIDE SERPL-SCNC: 108 MMOL/L (ref 94–109)
CO2 SERPL-SCNC: 23 MMOL/L (ref 20–32)
COPATH REPORT: NORMAL
CREAT SERPL-MCNC: 0.99 MG/DL (ref 0.52–1.04)
GFR SERPL CREATININE-BSD FRML MDRD: 67 ML/MIN/1.7M2
GLUCOSE SERPL-MCNC: 115 MG/DL (ref 70–99)
POTASSIUM SERPL-SCNC: 4.2 MMOL/L (ref 3.4–5.3)
SODIUM SERPL-SCNC: 139 MMOL/L (ref 133–144)

## 2018-10-24 PROCEDURE — 25000128 H RX IP 250 OP 636: Performed by: PHYSICIAN ASSISTANT

## 2018-10-24 PROCEDURE — 36415 COLL VENOUS BLD VENIPUNCTURE: CPT | Performed by: UROLOGY

## 2018-10-24 PROCEDURE — 80048 BASIC METABOLIC PNL TOTAL CA: CPT | Performed by: UROLOGY

## 2018-10-24 PROCEDURE — 25000132 ZZH RX MED GY IP 250 OP 250 PS 637: Performed by: PHYSICIAN ASSISTANT

## 2018-10-24 PROCEDURE — G0378 HOSPITAL OBSERVATION PER HR: HCPCS

## 2018-10-24 PROCEDURE — 99217 ZZC OBSERVATION CARE DISCHARGE: CPT | Performed by: PHYSICIAN ASSISTANT

## 2018-10-24 RX ORDER — ACETAMINOPHEN 500 MG
1000 TABLET ORAL EVERY 4 HOURS PRN
COMMUNITY
Start: 2018-10-24 | End: 2018-11-05

## 2018-10-24 RX ORDER — OXYCODONE HYDROCHLORIDE 5 MG/1
5 TABLET ORAL EVERY 6 HOURS PRN
Qty: 6 TABLET | Refills: 0 | Status: SHIPPED | OUTPATIENT
Start: 2018-10-24 | End: 2018-11-05

## 2018-10-24 RX ORDER — OXYCODONE HYDROCHLORIDE 5 MG/1
5 TABLET ORAL EVERY 4 HOURS PRN
Status: DISCONTINUED | OUTPATIENT
Start: 2018-10-24 | End: 2018-10-24 | Stop reason: HOSPADM

## 2018-10-24 RX ORDER — ONDANSETRON 4 MG/1
4 TABLET, ORALLY DISINTEGRATING ORAL EVERY 6 HOURS PRN
Qty: 20 TABLET | Refills: 0 | Status: SHIPPED | OUTPATIENT
Start: 2018-10-24 | End: 2018-11-09

## 2018-10-24 RX ADMIN — SODIUM CHLORIDE: 9 INJECTION, SOLUTION INTRAVENOUS at 05:44

## 2018-10-24 RX ADMIN — TAMSULOSIN HYDROCHLORIDE 0.4 MG: 0.4 CAPSULE ORAL at 10:33

## 2018-10-24 RX ADMIN — ACETAMINOPHEN 650 MG: 325 TABLET, FILM COATED ORAL at 03:51

## 2018-10-24 RX ADMIN — SENNOSIDES AND DOCUSATE SODIUM 2 TABLET: 8.6; 5 TABLET ORAL at 10:34

## 2018-10-24 RX ADMIN — OXYCODONE HYDROCHLORIDE 10 MG: 5 TABLET ORAL at 00:47

## 2018-10-24 NOTE — PLAN OF CARE
Problem: Patient Care Overview  Goal: Discharge Needs Assessment  PRIMARY DIAGNOSIS: ureteroscopy and stent placement  OUTPATIENT/OBSERVATION GOALS TO BE MET BEFORE DISCHARGE:  1. Stable vital signs Yes  2. Tolerating diet: Yes  3. Pain controlled with oral pain medications:  Yes, denies pain  4. Positive bowel sounds:  Yes  5. Voiding without difficulty:  Yes  6. Able to ambulate:  Yes, ind for activity  7. Provider specific discharge goals met: Yes      Discharge Planner Nurse   Safe discharge environment identified: Yes  Barriers to discharge: No     Please review provider order for any additional goals.   Nurse to notify provider when observation goals have been met and patient is ready for discharge.     Pt denies pain, voiding without difficulty, tolerating diet, VSS. BS active, denies nausea. Eager to discharge. Ride will be here at 1.

## 2018-10-24 NOTE — PLAN OF CARE
Problem: Patient Care Overview  Goal: Plan of Care/Patient Progress Review  Pre-op called, coming for patient early [id number].

## 2018-10-24 NOTE — PLAN OF CARE
Problem: Patient Care Overview  Goal: Plan of Care/Patient Progress Review  PRIMARY DIAGNOSIS: ureteroscopy and stent placement  OUTPATIENT/OBSERVATION GOALS TO BE MET BEFORE DISCHARGE:  1. Stable vital signs Yes  Vitals are Temp: 97.2  F (36.2  C) Temp src: Oral BP: 101/59 Pulse: 60 Heart Rate: 63 Resp: 22 SpO2: 92 %  2. Tolerating diet: Yes  3. Pain controlled with oral pain medications:  Yes, PRN oxycodone given at 12:45am with effective results, PRN tylenol given at 3:50am. Pt reports L) flank pain, states the pain has overall decreased  4. Positive bowel sounds:  Yes  5. Voiding without difficulty:  Yes, pt voided twice this shift without difficulty, denies pain when voiding  6. Able to ambulate:  Yes, ind for activity  7. Provider specific discharge goals met: Yes    Discharge Planner Nurse   Safe discharge environment identified: Yes  Barriers to discharge: No       Entered by: Kathleen Stoner 10/24/2018 3:53 AM     Please review provider order for any additional goals.   Nurse to notify provider when observation goals have been met and patient is ready for discharge.

## 2018-10-24 NOTE — PLAN OF CARE
Problem: Patient Care Overview  Goal: Plan of Care/Patient Progress Review  Outcome: No Change  PRIMARY DIAGNOSIS: Ureteroscopy, stent placement  OUTPATIENT/OBSERVATION GOALS TO BE MET BEFORE DISCHARGE:  1. Stable vital signs Yes  2. Tolerating diet:Yes, regular  3. Pain controlled with oral pain medications:  Yes  4. Positive bowel sounds:  Yes  5. Voiding without difficulty:  Yes  6. Able to ambulate:  Yes  7. Provider specific discharge goals met:  Yes    Discharge Planner Nurse   Safe discharge environment identified: Yes  Barriers to discharge: No       Entered by: Kat Howard 10/23/2018     VSS stable and on room air. Capnography in place, not alarming. Patient had Brenton Johns sandwich, tolerated well. IVF running @ 100/hr NS. Antibiotic given per orders.       Please review provider order for any additional goals. Nurse to notify provider when observation goals have been met and patient is ready for discharge.

## 2018-10-24 NOTE — OP NOTE
Procedure Date: 10/23/2018      PREOPERATIVE DIAGNOSIS:  7 mm mid left ureteral calculus.      POSTOPERATIVE DIAGNOSIS:  7 mm mid left ureteral calculus.      PROCEDURE:  Video cystoscopy, left ureteroscopy with stone extraction.      SURGEON:  Bull Mast MD      ANESTHESIA:  General laryngeal mask.      ESTIMATED BLOOD LOSS:  0 mL.      INDICATIONS:  The patient is a 28-year-old female with a history of calcium and uric acid stones in the past.  She has been followed by Kwame Jett MD, in the past and recently had a left double-J stent placed by Dr. Ulrich at the [facility].  She was to follow up with him, but in the meantime, her stent was removed by Dr. Olivo 10 days ago.  She now presents to the ER with left flank pain and has a large stone in the proximal to mid left ureter.  She is now undergoing stone extraction and understands the procedure, alternatives, risks and followup, and her followup will be to follow up at the [facility].      DESCRIPTION OF THE PROCEDURE:  The patient received 2 grams of Ancef and was taken to cystoscopy and placed supine on the operating table.  After adequate general laryngeal mask anesthesia, the patient was placed in lithotomy and her genitalia were prepped and draped in a sterile fashion.  A #22 Montserratian Storz cystoscope with obturator was gently introduced in the bladder.  Residual urine was clear.  The urethra and bladder revealed normal mucosa and no stones.  The left ureteral orifice was generous in size, and I passed a Glidewire up the ureter without any resistance until it curled under fluoroscopy in the region of the left renal pelvis.  I removed the cystoscope and passed the 6.9 Montserratian Storz ureteroscope into the bladder and up the ureter with ease.  Her stone was seen in the mid left ureter and was basketed and extracted without difficulty.  I repassed the ureteroscope, and there was a small fragment that was basketed.  There were no  perforations or bleeding.  The stones were sent for analysis.  The bladder was emptied with the cystoscope sheath, and the patient went to the recovery room in stable condition.         BLANK AGUSTIN JR, MD             D: 10/23/2018   T: 10/24/2018   MT: FRANCISCO      Name:     SOMMER GARCIA   MRN:      [id number]        Account:        [id number]   :      1990           Procedure Date: 10/23/2018      Document: [id number]       cc: NELLY FOURNIER MD

## 2018-10-24 NOTE — PROGRESS NOTES
OBSERVATION patient END time: 1230    Patient's After Visit Summary was reviewed with patient.   Patient verbalized understanding of After Visit Summary, recommended follow up and was given an opportunity to ask questions.   Discharge medications sent home with patient/family: YES,    Discharged with spouse

## 2018-10-24 NOTE — PLAN OF CARE
Problem: Patient Care Overview  Goal: Discharge Needs Assessment  PRIMARY DIAGNOSIS: ureteroscopy and stent placement  OUTPATIENT/OBSERVATION GOALS TO BE MET BEFORE DISCHARGE:  1. Stable vital signs Yes  2. Tolerating diet: Yes  3. Pain controlled with oral pain medications:  Yes, denies pain  4. Positive bowel sounds:  Yes  5. Voiding without difficulty:  Yes  6. Able to ambulate:  Yes, ind for activity  7. Provider specific discharge goals met: Yes     Discharge Planner Nurse   Safe discharge environment identified: Yes  Barriers to discharge: No       Please review provider order for any additional goals.   Nurse to notify provider when observation goals have been met and patient is ready for discharge.    Pt denies pain, voiding without difficulty, tolerating diet, VSS. BS active, denies nausea. Eager to discharge.

## 2018-10-24 NOTE — PLAN OF CARE
Problem: Patient Care Overview  Goal: Plan of Care/Patient Progress Review  PRIMARY DIAGNOSIS: ureteroscopy, stent placement   OUTPATIENT/OBSERVATION GOALS TO BE MET BEFORE DISCHARGE:  1. Stable vital signs Yes  2. Tolerating diet:Yes  3. Pain controlled with oral pain medications:  Yes  4. Positive bowel sounds:  Yes  5. Voiding without difficulty:  Yes  6. Able to ambulate:  Yes  7. Provider specific discharge goals met:  Yes    Discharge Planner Nurse   Safe discharge environment identified: Yes  Barriers to discharge: No       Entered by: Kathleen Stoner 10/23/2018 11:50 PM     Please review provider order for any additional goals.   Nurse to notify provider when observation goals have been met and patient is ready for discharge.    Vitals are Temp: 98  F (36.7  C) Temp src: Oral BP: 109/83 Pulse: 60 Heart Rate: 95 Resp: 22 SpO2: 94 %    Pt A&Ox4, ind for activity, VSS and on RA, reports a sharp pain 6/10, states oral pain meds are effective in relieving pain. Pt on capnography and WNL. She reported increased itching on top of feet bilat, PRN benadryl given. IVF running NS at 125mL/hr. Pt reported she has been drinking a lot of fluids after surgery and has been voiding without difficulty.

## 2018-10-25 ENCOUNTER — PATIENT OUTREACH (OUTPATIENT)
Dept: CARE COORDINATION | Facility: CLINIC | Age: 28
End: 2018-10-25

## 2018-10-25 ASSESSMENT — ACTIVITIES OF DAILY LIVING (ADL): DEPENDENT_IADLS:: INDEPENDENT

## 2018-10-25 NOTE — PROGRESS NOTES
Clinic Care Coordination Contact    Clinic Care Coordination Contact  OUTREACH    Referral Information:  Referral Source: ED Follow-Up.  Pt was in [facility] ED and admitted to OBS unit due to:  1. Acute renal colic - 7 mm L ureteral stone, no evidence of UTI. IVFs, pain control, supportive cares, [place]om[place] and Urology consult  2. YULISA - Ct 1.17, normal at baseline. Due to obstructing stone. IVFs and recheck in AM    SW placed call to pt to follow up on ED to admission.  Pt and SW where part way through our conversation when the phone stopped working.  SW called pt back, the call went right to voicemail.  SW left pt a message asking pt to return my call.  SW mailed the pt a letter of introduction on 10-16-18.  SW to try to call pt again in 1-2 business days.    Pt also without insurance at this time.  Previously stated she was working with [facility], however, it appears by where she is using the ED services that pt no longer residing in the [facility] area.  Pt was in the [place], [place] area over the summer as she was a  in the [facility].       [place] Utilization:    Utilization    Last refreshed: 10/24/2018  6:11 PM:  No Show Count (past year) 4       Last refreshed: 10/24/2018  6:11 PM:  ED visits 3       Last refreshed: 10/24/2018  6:11 PM:  Hospital admissions 3          Current as of: 10/24/2018  6:11 PM         Clinical Concerns:  Current Medical Concerns:  Pt recently hospitalized at [facility] due to acute renal colic.  Current Behavioral Concerns: Pt with lengthy history of mental illness.    Education Provided to patient: None today      Health Maintenance Reviewed: Due/Overdue:   HIV SCREEN (SYSTEM ASSIGNED)  8/6/2008       PAP SCREENING Q3 YR (SYSTEM ASSIGNED)  1/13/2015  10/1/2017     TOBACCO CESSATION COUNSELING Q1 YR  6/1/2016       INFLUENZA VACCINE (1)  9/1/2018       PHQ-9 Q6 MONTHS  9/19/2018        Clinical Pathway: None    Medication Management:  Uncertain how pt is getting or  affording her medications without insurance.     Functional Status:  Dependent ADLs:: Independent  Dependent IADLs:: Independent  Bed or wheelchair confined:: No  Mobility Status: Independent  Fallen 2 or more times in the past year?: No  Any fall with injury in the past year?: No    Living Situation:  Current living arrangement:: I live in a private home with spouse, I live in a private home with family  Type of residence:: Apartment    Supplies used at home:: None  Equipment Currently Used at Home: none    Advance Care Plan/Directive  Advanced Care Plans/Directives on file:: No    Referrals Placed: None     Goals:  Placed on hold as of today, 10-25-18  Goals        General    Mental Health Management (pt-stated)     Notes - Note edited  9/5/2018  3:39 PM by Nancy Anthony LSW    Goal Statement: I would like to get established with a long term Psychiatry provider.   Measure of Success: Pt will establish cares and keep appointments with a Psych provider  Supportive Steps to Achieve: [facility] and  [facility] will assist pt with obtaining appointment availability for Psych providers in her area.  Barriers: Pt has hx of not following with care providers for long term care needs  Strengths: Pt appears very motivated to work on her behavioral health issues at this time.  Date to Achieve By: 1 year                  Patient/Caregiver understanding: NA    Outreach Frequency: monthly  Future Appointments              In 4 days Becki Avery APRN CNP [facility]    In 3 weeks [id number]    In 3 weeks Kiran Ulrich MD [facility]          Plan: SW to attempt to contact pt in 1-2 business days.    Nancy Jaramillo  Social Work Care Coordinator  [facility]  [phone]

## 2018-10-28 LAB
APPEARANCE STONE: NORMAL
COMPN STONE: NORMAL
NUMBER STONE: 1
SIZE STONE: NORMAL MM
WT STONE: 76 MG

## 2018-10-30 ASSESSMENT — ACTIVITIES OF DAILY LIVING (ADL): DEPENDENT_IADLS:: INDEPENDENT

## 2018-10-30 NOTE — PROGRESS NOTES
Clinic Care Coordination Contact  [facility]/Voicemail    Referral Source: ED Follow-Up  Clinical Data: Care Coordinator Outreach  Outreach attempted x 2.  Left message on voicemail with call back information and requested return call.  Plan: Care Coordinator mailed out care coordination introduction letter on 10-16-18. Care Coordinator will try to reach patient again in 5-15 business days.    Nancy Jaramillo  Social Work Care Coordinator  [facility] [place] [facility]  [phone]

## 2018-11-05 ENCOUNTER — OFFICE VISIT (OUTPATIENT)
Dept: FAMILY MEDICINE | Facility: CLINIC | Age: 28
End: 2018-11-05
Payer: MEDICAID

## 2018-11-05 VITALS
DIASTOLIC BLOOD PRESSURE: 84 MMHG | BODY MASS INDEX: 29.82 KG/M2 | SYSTOLIC BLOOD PRESSURE: 135 MMHG | RESPIRATION RATE: 20 BRPM | HEART RATE: 80 BPM | WEIGHT: 190 LBS | HEIGHT: 67 IN | TEMPERATURE: 98.4 F

## 2018-11-05 DIAGNOSIS — R11.10 VOMITING, INTRACTABILITY OF VOMITING NOT SPECIFIED, PRESENCE OF NAUSEA NOT SPECIFIED, UNSPECIFIED VOMITING TYPE: Primary | ICD-10-CM

## 2018-11-05 DIAGNOSIS — N83.202 CYST OF LEFT OVARY: ICD-10-CM

## 2018-11-05 LAB — HBA1C MFR BLD: 5.8 % (ref 0–5.6)

## 2018-11-05 PROCEDURE — 99214 OFFICE O/P EST MOD 30 MIN: CPT | Performed by: FAMILY MEDICINE

## 2018-11-05 PROCEDURE — 83036 HEMOGLOBIN GLYCOSYLATED A1C: CPT | Performed by: FAMILY MEDICINE

## 2018-11-05 PROCEDURE — 36415 COLL VENOUS BLD VENIPUNCTURE: CPT | Performed by: FAMILY MEDICINE

## 2018-11-05 NOTE — MR AVS SNAPSHOT
After Visit Summary   11/5/2018    Ayana Prasad    MRN: [id number]           Patient Information     Date Of Birth          1990        Visit Information        Provider Department      11/5/2018 3:40 PM Ceci Underwood MD [facility]        Today's Diagnoses     Vomiting, intractability of vomiting not specified, presence of nausea not specified, unspecified vomiting type    -  1    Cyst of left ovary          Care Instructions    Follow up in 2 weeks or sooner if needed   Ovarian Cysts    The ovaries are two small organs located on each side of a woman s uterus (womb). They are part of the female reproductive system. Ovarian cysts are sacs filled with fluid or tissue that form on or inside the ovaries.  Ovarian cysts are common in women, especially during childbearing years. There are different types of cysts. Most are harmless (benign) and go away on their own. They often cause no symptoms. If symptoms do occur, they can include mild pain or pressure in the lower belly (abdomen).  Cysts that are large or break (rupture) may cause more severe pain and symptoms. In these cases, you may need hospital care or treatment such as surgery. You may need more extensive treatment if a cyst causes an ovary to twist (called torsion) or if your doctor suspects your cyst is cancerous. Keep in mind that most cysts are not cancerous, however.  General care    To help relieve pain, your healthcare provider may recommend using over-the-counter pain medicine. If needed, your provide may prescribe stronger pain medicine.    Depending on the type of cyst you have, your healthcare provider may advise taking birth control pills. These help shrink cysts in certain cases. They may also help prevent new cysts from forming. Be sure to take these medicines as directed if they are prescribed.    Your healthcare provider may advise you to watch your symptoms over time to see if they go away or  worsen. Regular ultrasound tests may also be advised. These can help check if a cyst goes away or grows in size.  Follow-up care  Follow up with your healthcare provider, or as advised.  When to seek medical advice  Call your healthcare provider right away if any of these occur:    Pain worsens or fails to get better with home treatment    Fever of 100.4 F (38 C) or higher (or other fever amount directed by your healthcare provider)    Nausea and vomiting    Weakness, dizziness, or fainting    Abnormal vaginal bleeding  Date Last Reviewed: 11/1/2017 2000-2018 [employer]. [address] [place] [address]. All rights reserved. This information is not intended as a substitute for professional medical care. Always follow your healthcare professional's instructions.                Follow-ups after your visit        Additional Services     GASTROENTEROLOGY ADULT REF PROCEDURE ONLY Dheeraj Malaika [phone]; [facility]       Last Lab Result: Creatinine (mg/dL)       Date                     Value                 10/24/2018               0.99             ----------  Body mass index is 29.76 kg/(m^2).     Needed:  No  Language:  English    Patient will be contacted to schedule procedure.     Please be aware that coverage of these services is subject to the terms and limitations of your health insurance plan.  Call member services at your health plan with any benefit or coverage questions.  Any procedures must be performed at a [facility] facility OR coordinated by your clinic's referral office.    Please bring the following with you to your appointment:    (1) Any X-Rays, CTs or MRIs which have been performed.  Contact the facility where they were done to arrange for  prior to your scheduled appointment.    (2) List of current medications   (3) This referral request   (4) Any documents/labs given to you for this referral                  Follow-up notes from your care team      Return in about 2 weeks (around 11/19/2018).      Your next 10 appointments already scheduled     Nov 15, 2018 12:30 PM CST   XR KUB with [facility]   [facility] ([facility])    [address] [address] [phone]           How do I prepare for my exam? (Food and drink instructions) No Food and Drink Restrictions.  How do I prepare for my exam? (Other instructions) You do not need to do anything special for this exam.  What should I wear: Wear comfortable clothes.  How long does the exam take: Most scans take less than 5 minutes.  What should I bring: Bring a list of your medicines, including vitamins, minerals and over-the-counter drugs. It is safest to leave personal items at home.  Do I need a :  No  is needed.  What do I need to tell my doctor: Tell your doctor if there s any chance you are pregnant.  What should I do after the exam: No restrictions, You may resume normal activities.  What is this test: An image of a specific body part shown in shades of black and white.  Who should I call with questions: If you have any questions, please call the Imaging Department where you will have your exam. Directions, parking instructions, and other information is available on our website, [email or web address].            Nov 15, 2018  1:00 PM CST   [facility] Return with Kiran Ulrich MD   [facility]    [address] [address]   [phone]              Who to contact     If you have questions or need follow up information about today's clinic visit or your schedule please contact [facility] directly at [phone].  Normal or non-critical lab and imaging results will be communicated to you by MyChart, letter or phone within 4 business days after the clinic has received the results. If you do not hear from us within 7 days, please contact the clinic through  "MyChart or phone. If you have a critical or abnormal lab result, we will notify you by phone as soon as possible.  Submit refill requests through [employer] or call your pharmacy and they will forward the refill request to us. Please allow 3 business days for your refill to be completed.          Additional Information About Your Visit        [facility] Information     [employer] gives you secure access to your electronic health record. If you see a primary care provider, you can also send messages to your care team and make appointments. If you have questions, please call your primary care clinic.  If you do not have a primary care provider, please call [phone] and they will assist you.        Care EveryWhere ID     This is your Care EveryWhere ID. This could be used by other organizations to access your [facility] medical records  [id number]        Your Vitals Were     Pulse Temperature Respirations Height Breastfeeding? BMI (Body Mass Index)    80 98.4  F (36.9  C) (Oral) 20 5' 7\" (1.702 m) No 29.76 kg/m2       Blood Pressure from Last 3 Encounters:   11/05/18 135/84   10/24/18 (P) 119/64   10/16/18 (!) 128/96    Weight from Last 3 Encounters:   11/05/18 190 lb (86.2 kg)   10/16/18 190 lb (86.2 kg)   10/13/18 192 lb (87.1 kg)              We Performed the Following     GASTROENTEROLOGY ADULT REF PROCEDURE ONLY Dheeraj Dai [phone]; [facility]     Hemoglobin A1c          Today's Medication Changes          These changes are accurate as of 11/5/18  4:37 PM.  If you have any questions, ask your nurse or doctor.               Stop taking these medicines if you haven't already. Please contact your care team if you have questions.     acetaminophen 500 MG tablet   Commonly known as:  TYLENOL   Stopped by:  Ceci Underwood MD           cephALEXin 500 MG capsule   Commonly known as:  KEFLEX   Stopped by:  Ceci Underwood MD           oxyCODONE IR 5 MG tablet   Commonly known as:  " ROXICODONE   Stopped by:  Ceci Underwood MD                    Primary Care Provider Office Phone # Fax #    BROOKLYN Ford [phone]       [facility] [place] [address]   [place] [address]        Goals        General    Mental Health Management (pt-stated)     Notes - Note edited  9/5/2018  3:39 PM by Nancy Anthony LSW    Goal Statement: I would like to get established with a long term Psychiatry provider.   Measure of Success: Pt will establish cares and keep appointments with a Psych provider  Supportive Steps to Achieve: [facility] and [facility] will assist pt with obtaining appointment availability for Psych providers in her area.  Barriers: Pt has hx of not following with care providers for long term care needs  Strengths: Pt appears very motivated to work on her behavioral health issues at this time.  Date to Achieve By: 1 year            Equal Access to Services     PIPPA KOCH AH: Hadii dale abrahamo Sohéctor, waaxda luqadaha, qaybta kaalmada adeegyada, waxmonica roca . So [facility] [phone].    ATENCIÓN: Si habla español, tiene a xiong disposición servicios gratuitos de asistencia lingüística. Llame al [phone].    We comply with applicable federal civil rights laws and [place] laws. We do not discriminate on the basis of race, color, national origin, age, disability, sex, sexual orientation, or gender identity.            Thank you!     Thank you for choosing [facility]  for your care. Our goal is always to provide you with excellent care. Hearing back from our patients is one way we can continue to improve our services. Please take a few minutes to complete the written survey that you may receive in the mail after your visit with us. Thank you!             Your Updated Medication List - Protect others around you: Learn how to safely use, store and throw away your medicines at www.disposemymeds.org.           This list is accurate as of 11/5/18  4:37 PM.  Always use your most recent med list.                   Brand Name Dispense Instructions for use Diagnosis    ondansetron 4 MG ODT tab    ZOFRAN-ODT    20 tablet    Take 1 tablet (4 mg) by mouth every 6 hours as needed for nausea or vomiting    Nephrolithiasis       tretinoin 0.05 % cream    RETIN-A     Apply topically daily

## 2018-11-05 NOTE — PROGRESS NOTES
"  SUBJECTIVE:   Ayana Prasad is a 28 year old female who presents to clinic today for the following health issues:      Vomiting x years    Patient here today to discuss ongoing vomiting over the last couple of years.   Has lost close to 30 lbs since July/ August. She was recently in the hospital for recurrent kidneys  Stones. She had a stent removed but had to be admitted for intractable left sided renal colic.   States she is not here today to follow up for her kidney issues.   She states \"It seems to just happen out of no where\". Denies any abdominal pain or nausea.   Patient does have a history of polysubstance abuse and states she no longer uses heroin but does  Still smoke marijuana. Admits she has cut back on her marijuana use with no improvement in her  symptoms.    Triggers - anything that smells differently, toothbrush, hard candy, tobacco, motion, pain, coughing, sneezing, blowing her nose, physical exertion   She has had an EGD in 2014 which was unremarkable.     Patient also wonders why her left ovarian cyst noted on CT seems to have grown so quickly. She dicussed this with a friend who is in medical school who advised she get this treated as it has grown rather quickly.     Wt Readings from Last 4 Encounters:   11/05/18 190 lb (86.2 kg)   10/16/18 190 lb (86.2 kg)   10/13/18 192 lb (87.1 kg)   08/28/18 210 lb (95.3 kg)           Problem list and histories reviewed & adjusted, as indicated.  Additional history: as documented    Patient Active Problem List   Diagnosis     Urolithiasis     ADHD (attention deficit hyperactivity disorder)     CARDIOVASCULAR SCREENING; LDL GOAL LESS THAN 160     Bipolar 1 disorder, manic, mild     Marijuana abuse     Polysubstance abuse (H)     GERD (gastroesophageal reflux disease)     Tobacco abuse     Health Care Home     Abdominal pain, right upper quadrant     Intractable back pain     Renal colic     Insomnia     Optic neuritis     Cauda equina syndrome with " neurogenic bladder (H)     MENTAL HEALTH     Schizoaffective disorder, bipolar type (H)     PTSD (post-traumatic stress disorder)     Anxiety     Auditory hallucinations     Class 2 obesity due to excess calories in adult     Nephrolithiasis     Flank pain     Vomiting, intractability of vomiting not specified, presence of nausea not specified, unspecified vomiting type     Past Surgical History:   Procedure Laterality Date     BACK SURGERY  12/24/2016    For Cauda Equina Syndrome     COMBINED CYSTOSCOPY, INSERT STENT URETER(S) Left 8/30/2018    Procedure: COMBINED CYSTOSCOPY, INSERT STENT URETER(S);  Cystoscopy With Left Stent Placement;  Surgeon: Kiran Ulrich MD;  Location: [facility]     COMBINED CYSTOSCOPY, RETROGRADES, EXCHANGE STENT URETER(S) Left 10/14/2018    Procedure: COMBINED CYSTOSCOPY, RETROGRADES, EXCHANGE STENT URETER(S);  Cystoscopy and removal of left-sided stent.;  Surgeon: Stiven Olivo MD;  Location:  OR     COMBINED CYSTOSCOPY, RETROGRADES, URETEROSCOPY, INSERT STENT  1/6/2014    Procedure: COMBINED CYSTOSCOPY, RETROGRADES, URETEROSCOPY, INSERT STENT;  Cystyoscopy place left ureteral stent;  Surgeon: Jaun Kimble MD;  Location: [facility]     COMBINED CYSTOSCOPY, RETROGRADES, URETEROSCOPY, INSERT STENT Left 10/23/2018    Procedure: Video cystoscopy, left ureteroscopy with stone extraction;  Surgeon: Bull Mast MD;  Location:  OR     CYSTOSCOPY, URETEROSCOPY, COMBINED Right 9/23/2015    Procedure: COMBINED CYSTOSCOPY, URETEROSCOPY;  Surgeon: ROME Jett MD;  Location: [facility]     ENT SURGERY       ESOPHAGOSCOPY, GASTROSCOPY, DUODENOSCOPY (EGD), COMBINED  4/8/2013    Procedure: COMBINED ESOPHAGOSCOPY, GASTROSCOPY, DUODENOSCOPY (EGD), BIOPSY SINGLE OR MULTIPLE;  Gastroscopy;  Surgeon: Peter Pickard MD;  Location: [facility]     ESOPHAGOSCOPY, GASTROSCOPY, DUODENOSCOPY (EGD), COMBINED Left 10/28/2014    Procedure: COMBINED ESOPHAGOSCOPY, GASTROSCOPY,  DUODENOSCOPY (EGD), BIOPSY SINGLE OR MULTIPLE;  Surgeon: Narcisa Ramirez MD;  Location:  OR     GENITOURINARY SURGERY  3.11.2011    removal of kidney stones     LAPAROSCOPIC CHOLECYSTECTOMY  11/20/2014    [facility], Dr. Ramirez     LASER HOLMIUM LITHOTRIPSY URETER(S), INSERT STENT, COMBINED  4/2/2014    Procedure: COMBINED CYSTOSCOPY, URETEROSCOPY, LASER HOLMIUM LITHOTRIPSY URETER(S), INSERT STENT;  Cystoscopy,Left Ureteral Stent Removal,Left Ureteroscopy with Laser Lithotripsy,Left Ureter Stent Placement;  Surgeon: ROME Jett MD;  Location: [facility]     SURGICAL HISTORY OF -   3/11/2011    Transurethral stone resection       Social History   Substance Use Topics     Smoking status: Former Smoker     Packs/day: 0.50     Years: 5.00     Types: Other     Start date: 8/1/2011     Smokeless tobacco: Current User     Types: Chew      Comment: Smokes E-cig     Alcohol use No     Family History   Problem Relation Age of Onset     GASTROINTESTINAL DISEASE Father      Crohn's Disease     Cancer Father      Liver Cancer     Cancer Maternal Grandmother      lympoma     Diabetes Maternal Grandmother      Mental Illness Maternal Grandmother      Diabetes Maternal Grandfather      Hypertension Maternal Grandfather      Prostate Cancer Maternal Grandfather      HEART DISEASE Paternal Grandfather      heart disease     Hypertension Brother      Other Cancer Brother      Esophegeal cancer     Diabetes Brother      Hyperlipidemia Mother      Mental Illness Mother      Hypertension Brother      Other Cancer Brother            Reviewed and updated as needed this visit by clinical staff  Tobacco  Allergies  Meds       Reviewed and updated as needed this visit by Provider         ROS:  Constitutional, HEENT, cardiovascular, pulmonary, GI, , musculoskeletal, neuro, skin, endocrine and psych systems are negative, except as otherwise noted.    OBJECTIVE:     /84 (BP Location: Right arm, Patient Position: Chair, Cuff  "Size: Adult Large)  Pulse 80  Temp 98.4  F (36.9  C) (Oral)  Resp 20  Ht 5' 7\" (1.702 m)  Wt 190 lb (86.2 kg)  Breastfeeding? No  BMI 29.76 kg/m2  Body mass index is 29.76 kg/(m^2).  GENERAL: healthy, alert and no distress  HENT: ear canals and TM's normal, nose and mouth without ulcers or lesions  NECK: no adenopathy, no asymmetry, masses, or scars and thyroid normal to palpation  RESP: lungs clear to auscultation - no rales, rhonchi or wheezes  CV: regular rate and rhythm, normal S1 S2  ABDOMEN: soft, nontender, no hepatosplenomegaly, no masses and bowel sounds normal  PSYCH: mentation appears normal, affect normal/bright    Diagnostic Test Results:  none     ASSESSMENT/PLAN:         1. Vomiting, intractability of vomiting not specified, presence of nausea not specified, unspecified vomiting type  - discussed with patient long term use of marijuana can cause emesis. Advise cessation. Will evaluate with EGD   - GASTROENTEROLOGY ADULT REF PROCEDURE ONLY Dheeraj Dai [phone]; [facility]  - Hemoglobin A1c    2. Cyst of left ovary  - natural course and treatment discussed.   With current size of 1.8 cm will continue to watch- no surgical intervention warranted.     HM- patient had never had a pap and states she does not want one. Reviewed importance and for her to re-consider.     See Patient Instructions    Ceci Underwood MD  [facility], [place]            "

## 2018-11-05 NOTE — PATIENT INSTRUCTIONS
Follow up in 2 weeks or sooner if needed   Ovarian Cysts    The ovaries are two small organs located on each side of a woman s uterus (womb). They are part of the female reproductive system. Ovarian cysts are sacs filled with fluid or tissue that form on or inside the ovaries.  Ovarian cysts are common in women, especially during childbearing years. There are different types of cysts. Most are harmless (benign) and go away on their own. They often cause no symptoms. If symptoms do occur, they can include mild pain or pressure in the lower belly (abdomen).  Cysts that are large or break (rupture) may cause more severe pain and symptoms. In these cases, you may need hospital care or treatment such as surgery. You may need more extensive treatment if a cyst causes an ovary to twist (called torsion) or if your doctor suspects your cyst is cancerous. Keep in mind that most cysts are not cancerous, however.  General care    To help relieve pain, your healthcare provider may recommend using over-the-counter pain medicine. If needed, your provide may prescribe stronger pain medicine.    Depending on the type of cyst you have, your healthcare provider may advise taking birth control pills. These help shrink cysts in certain cases. They may also help prevent new cysts from forming. Be sure to take these medicines as directed if they are prescribed.    Your healthcare provider may advise you to watch your symptoms over time to see if they go away or worsen. Regular ultrasound tests may also be advised. These can help check if a cyst goes away or grows in size.  Follow-up care  Follow up with your healthcare provider, or as advised.  When to seek medical advice  Call your healthcare provider right away if any of these occur:    Pain worsens or fails to get better with home treatment    Fever of 100.4 F (38 C) or higher (or other fever amount directed by your healthcare provider)    Nausea and vomiting    Weakness, dizziness, or  fainting    Abnormal vaginal bleeding  Date Last Reviewed: 11/1/2017 2000-2018 [employer]. [address] [place] [address], [place] [address]. All rights reserved. This information is not intended as a substitute for professional medical care. Always follow your healthcare professional's instructions.

## 2018-11-06 ENCOUNTER — HOSPITAL ENCOUNTER (OUTPATIENT)
Facility: CLINIC | Age: 28
End: 2018-11-06
Attending: INTERNAL MEDICINE | Admitting: INTERNAL MEDICINE
Payer: MEDICAID

## 2018-11-06 RX ORDER — ONDANSETRON 2 MG/ML
4 INJECTION INTRAMUSCULAR; INTRAVENOUS
Status: CANCELLED | OUTPATIENT
Start: 2018-11-06

## 2018-11-06 RX ORDER — LIDOCAINE 40 MG/G
CREAM TOPICAL
Status: CANCELLED | OUTPATIENT
Start: 2018-11-06

## 2018-11-08 ENCOUNTER — TELEPHONE (OUTPATIENT)
Dept: UROLOGY | Facility: CLINIC | Age: 28
End: 2018-11-08

## 2018-11-08 ENCOUNTER — MYC MEDICAL ADVICE (OUTPATIENT)
Dept: FAMILY MEDICINE | Facility: CLINIC | Age: 28
End: 2018-11-08

## 2018-11-08 DIAGNOSIS — N20.0 NEPHROLITHIASIS: ICD-10-CM

## 2018-11-08 NOTE — TELEPHONE ENCOUNTER
Reason for Call:  Form, our goal is to have forms completed with 72 hours, however, some forms may require a visit or additional information.    Type of letter, form or note:  Financial - Clinical override    Who is the form from?: Financial Counselor - [place] (if other please explain)    Where did the form come from: form was faxed in    What clinic location was the form placed at?: [facility] (ENT, Neurology, Rheumatology, Surgery, Urology, Vascular Surgery)    Where the form was placed: Given to MA/RN    What number is listed as a contact on the form?: [phone]       Additional comments: Fax completed form to: [phone]    Call taken on 11/8/2018 at 10:18 AM by Denise Behrendt

## 2018-11-09 ENCOUNTER — TELEPHONE (OUTPATIENT)
Dept: UROLOGY | Facility: CLINIC | Age: 28
End: 2018-11-09

## 2018-11-09 RX ORDER — ONDANSETRON 4 MG/1
4 TABLET, ORALLY DISINTEGRATING ORAL EVERY 6 HOURS PRN
Qty: 20 TABLET | Refills: 0 | Status: SHIPPED | OUTPATIENT
Start: 2018-11-09 | End: 2018-12-07

## 2018-11-09 NOTE — TELEPHONE ENCOUNTER
[facility] Call Center    Phone Message    May a detailed message be left on voicemail: yes    Reason for Call: Other: Pt was told to call in and make an appt. Pt states that Nurse from Dr. Emery team had contacted her to make the appt and that they will put the referral in. There is no referral in the Pt's chart and there are no notes of the conversation that the Nurse had with the Pt. Please call the Pt back with an update on what to do.     Action Taken: Message routed to:  [facility] ([facility]  urology

## 2018-11-13 NOTE — TELEPHONE ENCOUNTER
Per Dr Ulrich , [facility]  [facility].pt does not need a referral.   I sent a message to Kristin rodrigez RN at the  [facility], to call and schedule this patient.  doreen cancino LPN

## 2018-11-18 ENCOUNTER — NURSE TRIAGE (OUTPATIENT)
Dept: NURSING | Facility: CLINIC | Age: 28
End: 2018-11-18

## 2018-11-19 NOTE — TELEPHONE ENCOUNTER
Developed itching and rash all over about 2 hrs ago. Describes rash as little white bumps. No red or purple. Very itchy. No breathing or swallowing difficulty. No swelling of face, lips or tongue. Not taking any medications other than took Benadryl 10 min ago.  Advised see provider within 24 hrs. Disc'd guideline care advice. Pt voiced understanding and agreement. Roxanna Doty RN/FNA      Reason for Disposition    SEVERE itching (i.e., interferes with sleep, normal activities or school)    Additional Information    Negative: [1] Life-threatening reaction (anaphylaxis) in the past to similar substance (e.g., food, insect bite/sting, chemical, etc.) AND [2] < 2 hours since exposure    Negative: [1] Sudden onset of rash (within last 2 hours) AND [2] difficulty with breathing or swallowing    Negative: Shock suspected (e.g., cold/pale/clammy skin, too weak to stand, low BP, rapid pulse)    Negative: Difficult to awaken or acting confused  (e.g., disoriented, slurred speech)    Negative: [1] Purple or blood-colored spots or dots AND [2] fever    Negative: Sounds like a life-threatening emergency to the triager    Negative: Insect bites suspected    Negative: Swimmer's Itch suspected    Negative: Sunburn suspected    Negative: Hives suspected    Negative: [1] Chickenpox suspected AND [2] known exposure to chickenpox in past 3 weeks    Negative: [1] Drug rash suspected AND [2] started taking new medicine within last 2 weeks(Exception: antihistamine, eye drops, ear drops, decongestant or other OTC cough/cold medicines)    Negative: [1] Widespread rash AND [2] bright red, sunburn-like AND [3] current tampon use or nasal packing    Negative: [1] Widespread rash AND [2] bright red, sunburn-like AND [3] wound infection or recent surgery    Negative: [1] Bright red skin AND [2] peels off in sheets    Negative: Stiff neck (unable to touch chin to chest)    Negative: Fever    Negative: Joint pain or swelling    Negative: Rash  looks like large or small blisters (i.e., fluid filled bubbles or sacs on the skin)    Negative: Patient sounds very sick or weak to the triager    Negative: [1] Purple or blood-colored rash (spots or dots) AND [2] no fever AND [3] sounds well to triager    Negative: Sores in mouth    Negative: Face becomes swollen    Negative: [1] Headache AND [2] no fever    Protocols used: RASH OR REDNESS - WIDESPREAD-ADULT-AH

## 2018-11-26 ENCOUNTER — PATIENT OUTREACH (OUTPATIENT)
Dept: CARE COORDINATION | Facility: CLINIC | Age: 28
End: 2018-11-26

## 2018-11-26 NOTE — PROGRESS NOTES
Clinic Care Coordination Contact  [facility]/Voicemail       Clinical Data: Care Coordinator Outreach    Outreach attempted x 3.  Left message on voicemail with call back information and requested return call.      However, this writer also notes that pt has changed PCP to Ceci Cnatu MD at the [facility].        Nancy Jaramillo  Social Work Care Coordinator  [facility]  [phone]

## 2018-11-29 ENCOUNTER — NURSE TRIAGE (OUTPATIENT)
Dept: NURSING | Facility: CLINIC | Age: 28
End: 2018-11-29

## 2018-11-29 ENCOUNTER — OFFICE VISIT (OUTPATIENT)
Dept: URGENT CARE | Facility: URGENT CARE | Age: 28
End: 2018-11-29
Payer: MEDICAID

## 2018-11-29 VITALS
DIASTOLIC BLOOD PRESSURE: 86 MMHG | TEMPERATURE: 98 F | OXYGEN SATURATION: 97 % | SYSTOLIC BLOOD PRESSURE: 122 MMHG | HEART RATE: 99 BPM

## 2018-11-29 DIAGNOSIS — R43.0 LOSS OF SMELL: ICD-10-CM

## 2018-11-29 DIAGNOSIS — R50.9 FEVER IN ADULT: ICD-10-CM

## 2018-11-29 DIAGNOSIS — J39.2 ERYTHEMA OF PHARYNX: ICD-10-CM

## 2018-11-29 DIAGNOSIS — J06.9 VIRAL URI: Primary | ICD-10-CM

## 2018-11-29 DIAGNOSIS — R43.2 LOSS OF TASTE: ICD-10-CM

## 2018-11-29 LAB
DEPRECATED S PYO AG THROAT QL EIA: NORMAL
FLUAV+FLUBV AG SPEC QL: NEGATIVE
FLUAV+FLUBV AG SPEC QL: NEGATIVE
SPECIMEN SOURCE: NORMAL
SPECIMEN SOURCE: NORMAL

## 2018-11-29 PROCEDURE — 99213 OFFICE O/P EST LOW 20 MIN: CPT | Performed by: PHYSICIAN ASSISTANT

## 2018-11-29 PROCEDURE — 87804 INFLUENZA ASSAY W/OPTIC: CPT | Mod: 91 | Performed by: PHYSICIAN ASSISTANT

## 2018-11-29 PROCEDURE — 87880 STREP A ASSAY W/OPTIC: CPT | Performed by: PHYSICIAN ASSISTANT

## 2018-11-29 PROCEDURE — 87081 CULTURE SCREEN ONLY: CPT | Performed by: PHYSICIAN ASSISTANT

## 2018-11-29 NOTE — TELEPHONE ENCOUNTER
"\"Last night I lost my sense of taste and smell.\" Patient reporting fever started yesterday. Current temp is 102 Oral. Patient reporting she lost all sense of smell and taste after vomiting x 1 yesterday. Denies further vomiting today. Patient has not taken a fever reducer medication today. Denies other symptoms. Taking fluids.     Advised to be seen with in 24 hours. Caller verbalized understanding. Denies further questions.    Vero Kimble RN  [facility]        Additional Information    Negative: Shock suspected (e.g., cold/pale/clammy skin, too weak to stand, low BP, rapid pulse)    Negative: Difficult to awaken or acting confused  (e.g., disoriented, slurred speech)    Negative: [1] Difficulty breathing AND [2] bluish lips, tongue or face    Negative: New onset rash with multiple purple (or blood-colored) spots or dots    Negative: Sounds like a life-threatening emergency to the triager    Negative: Fever in a cancer patient who is currently is receiving chemotherapy or radiation therapy, or cancer patient who has metastatic or end-stage cancer and is receiving palliative care    Negative: Pregnant    Negative: Fever onset within 24 hours of receiving vaccine    Negative: [1] Fever AND [2] within 21 days of Ebola EXPOSURE    Negative: Other symptom is present, see that guideline  (e.g., symptoms of cough, runny nose, sore throat, earache, abdominal pain, diarrhea, vomiting)    Negative: [1] Headache AND [2] stiff neck (can't touch chin to chest)    Negative: Difficulty breathing    Negative: IV drug abuse    Negative: [1] Drinking very little AND [2] dehydration suspected (e.g., no urine > 12 hours, very dry mouth, very lightheaded)    Negative: Patient sounds very sick or weak to the triager  (Exception: mild weakness and hasn't taken fever medicine)    Negative: Fever > 104 F (40 C)    Negative: [1] Fever > 101 F (38.3 C) AND [2] age > 60    Negative: [1] Fever > 101 F (38.3 C) AND [2] bedridden " (e.g., nursing home patient, CVA, chronic illness, recovering from surgery)    Negative: [1] Fever > 100.5 F (38.1 C) AND [2] indwelling urinary catheter (e.g., Styles, Coude)    Negative: [1] Fever > 100.5 F (38.1 C) AND [2] has port (portacath), central line, or PICC line    Negative: [1] Fever > 100.5 F (38.1 C) AND [2] diabetes mellitus or weak immune system (e.g., HIV positive, splenectomy, chronic steroids)    Negative: [1] Fever > 100.5 F (38.1 C) AND [2] surgery in the last month    Negative: Transplant patient (e.g., kidney, liver, lung, heart)    Negative: Fever present > 3 days (72 hours)    Negative: [1] Fever > 100.5 F (38.1 C) AND [2] foreign travel to a developing country in the last month    Negative: [1] Intermittent fever > 100.5 F (38.1 C) AND [2] lasts > 3 weeks    [1] Fever AND [2] no signs of serious infection or localizing symptoms (all other triage questions negative)    Protocols used: FEVER-ADULT-AH

## 2018-11-29 NOTE — MR AVS SNAPSHOT
After Visit Summary   11/29/2018    Ayana Prasad    MRN: [id number]           Patient Information     Date Of Birth          1990        Visit Information        Provider Department      11/29/2018 4:00 PM Vu Thomas PA-C [facility]        Today's Diagnoses     Fever in adult    -  1    Erythema of pharynx        Loss of smell        Loss of taste          Care Instructions      Viral Upper Respiratory Illness (Adult)  You have a viral upper respiratory illness (URI), which is another term for the common cold. This illness is contagious during the first few days. It is spread through the air by coughing and sneezing. It may also be spread by direct contact (touching the sick person and then touching your own eyes, nose, or mouth). Frequent handwashing will decrease risk of spread. Most viral illnesses go away within 7 to 10 days with rest and simple home remedies. Sometimes the illness may last for several weeks. Antibiotics will not kill a virus, and they are generally not prescribed for this condition.    Home care    If symptoms are severe, rest at home for the first 2 to 3 days. When you resume activity, don't let yourself get too tired.    Don't smoke. If you need help stopping, talk with your healthcare provider.    Avoid being exposed to cigarette smoke (yours or others ).    You may use acetaminophen or ibuprofen to control pain and fever, unless another medicine was prescribed. If you have chronic liver or kidney disease, have ever had a stomach ulcer or gastrointestinal bleeding, or are taking blood-thinning medicines, talk with your healthcare provider before using these medicines. Aspirin should never be given to anyone under 18 years of age who is ill with a viral infection or fever. It may cause severe liver or brain damage.    Your appetite may be poor, so a light diet is fine. Stay well hydrated by drinking 6 to 8 glasses of fluids per day (water,  soft drinks, juices, tea, or soup). Extra fluids will help loosen secretions in the nose and lungs.    Over-the-counter cold medicines will not shorten the length of time you re sick, but they may be helpful for the following symptoms: cough, sore throat, and nasal and sinus congestion. If you take prescription medicines, ask your healthcare provider or pharmacist which over-the-counter medicines are safe to use. (Note: Don't use decongestants if you have high blood pressure.)  Follow-up care  Follow up with your healthcare provider, or as advised.  When to seek medical advice  Call your healthcare provider right away if any of these occur:    Cough with lots of colored sputum (mucus)    Severe headache; face, neck, or ear pain    Difficulty swallowing due to throat pain    Fever of 100.4 F (38 C) or higher, or as directed by your healthcare provider  Call 911  Call 911 if any of these occur:    Chest pain, shortness of breath, wheezing, or difficulty breathing    Coughing up blood    Very severe pain with swallowing, especially if it goes along with a muffled voice   Date Last Reviewed: 6/1/2018 2000-2018 [employer]. [address] [place] [address]. All rights reserved. This information is not intended as a substitute for professional medical care. Always follow your healthcare professional's instructions.      11/29/18 Urgent Care:     I do not see any evidence of bacterial infection to treat with antibiotics here today. The loss of smell and taste that came on last night with your sudden onset fever and nasal congestion is most likely to be related to a sudden onset viral respiratory infection.     However, if your smell and taste does not return, you need to follow-up with your primary care provider. You may need a head imaging study or a referral to see an ear, nose and throat doctor and/or a neurologist if your smell and taste remains absent/doesn't return.           Follow-ups  after your visit        Your next 10 appointments already scheduled     May 21, 2019  1:20 PM CDT   (Arrive by 1:05 PM)   New Patient Visit with Vero Carmen MD   [facility] ([facility] [place])    [address] [address] [phone]              Who to contact     If you have questions or need follow up information about today's clinic visit or your schedule please contact [facility] directly at [phone].  Normal or non-critical lab and imaging results will be communicated to you by [employer], letter or phone within 4 business days after the clinic has received the results. If you do not hear from us within 7 days, please contact the clinic through [employer] or phone. If you have a critical or abnormal lab result, we will notify you by phone as soon as possible.  Submit refill requests through [employer] or call your pharmacy and they will forward the refill request to us. Please allow 3 business days for your refill to be completed.          Additional Information About Your Visit        MyChart Information     [employer] gives you secure access to your electronic health record. If you see a primary care provider, you can also send messages to your care team and make appointments. If you have questions, please call your primary care clinic.  If you do not have a primary care provider, please call [phone] and they will assist you.        Care EveryWhere ID     This is your Care EveryWhere ID. This could be used by other organizations to access your [place] medical records  [id number]        Your Vitals Were     Pulse Temperature Pulse Oximetry             99 98  F (36.7  C) (Tympanic) 97%          Blood Pressure from Last 3 Encounters:   11/29/18 122/86   11/05/18 135/84   10/24/18 (P) 119/64    Weight from Last 3 Encounters:   11/05/18 190 lb (86.2 kg)   10/16/18 190 lb (86.2 kg)    10/13/18 192 lb (87.1 kg)              We Performed the Following     Beta strep group A culture     Influenza A/B antigen     Strep, Rapid Screen        Primary Care Provider Office Phone # Fax #    Ceci Underwood [phone] [address] [facility] [address]        Equal Access to Services     [facility]: Hadii aad ku hadasho Soomaali, waaxda luqadaha, qaybta kaalmada adeegyada, waxay idiin hayaan adeeg khsaesh laSundayaan . So [facility] [phone].    ATENCIÓN: Si habla español, tiene a xiogn disposición servicios gratuitos de asistencia lingüística. Llame al [phone].    We comply with applicable federal civil rights laws and [place] laws. We do not discriminate on the basis of race, color, national origin, age, disability, sex, sexual orientation, or gender identity.            Thank you!     Thank you for choosing [facility]  for your care. Our goal is always to provide you with excellent care. Hearing back from our patients is one way we can continue to improve our services. Please take a few minutes to complete the written survey that you may receive in the mail after your visit with us. Thank you!             Your Updated Medication List - Protect others around you: Learn how to safely use, store and throw away your medicines at www.disposemymeds.org.          This list is accurate as of 11/29/18  5:16 PM.  Always use your most recent med list.                   Brand Name Dispense Instructions for use Diagnosis    ondansetron 4 MG ODT tab    ZOFRAN-ODT    20 tablet    Take 1 tablet (4 mg) by mouth every 6 hours as needed for nausea or vomiting    Nephrolithiasis       tretinoin 0.05 % external cream    RETIN-A     Apply topically daily
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
## 2018-11-29 NOTE — PATIENT INSTRUCTIONS
Viral Upper Respiratory Illness (Adult)  You have a viral upper respiratory illness (URI), which is another term for the common cold. This illness is contagious during the first few days. It is spread through the air by coughing and sneezing. It may also be spread by direct contact (touching the sick person and then touching your own eyes, nose, or mouth). Frequent handwashing will decrease risk of spread. Most viral illnesses go away within 7 to 10 days with rest and simple home remedies. Sometimes the illness may last for several weeks. Antibiotics will not kill a virus, and they are generally not prescribed for this condition.    Home care    If symptoms are severe, rest at home for the first 2 to 3 days. When you resume activity, don't let yourself get too tired.    Don't smoke. If you need help stopping, talk with your healthcare provider.    Avoid being exposed to cigarette smoke (yours or others ).    You may use acetaminophen or ibuprofen to control pain and fever, unless another medicine was prescribed. If you have chronic liver or kidney disease, have ever had a stomach ulcer or gastrointestinal bleeding, or are taking blood-thinning medicines, talk with your healthcare provider before using these medicines. Aspirin should never be given to anyone under 18 years of age who is ill with a viral infection or fever. It may cause severe liver or brain damage.    Your appetite may be poor, so a light diet is fine. Stay well hydrated by drinking 6 to 8 glasses of fluids per day (water, soft drinks, juices, tea, or soup). Extra fluids will help loosen secretions in the nose and lungs.    Over-the-counter cold medicines will not shorten the length of time you re sick, but they may be helpful for the following symptoms: cough, sore throat, and nasal and sinus congestion. If you take prescription medicines, ask your healthcare provider or pharmacist which over-the-counter medicines are safe to use. (Note: Don't use  decongestants if you have high blood pressure.)  Follow-up care  Follow up with your healthcare provider, or as advised.  When to seek medical advice  Call your healthcare provider right away if any of these occur:    Cough with lots of colored sputum (mucus)    Severe headache; face, neck, or ear pain    Difficulty swallowing due to throat pain    Fever of 100.4 F (38 C) or higher, or as directed by your healthcare provider  Call 911  Call 911 if any of these occur:    Chest pain, shortness of breath, wheezing, or difficulty breathing    Coughing up blood    Very severe pain with swallowing, especially if it goes along with a muffled voice   Date Last Reviewed: 6/1/2018 2000-2018 [employer]. [address] [place] [address], [place] [address]. All rights reserved. This information is not intended as a substitute for professional medical care. Always follow your healthcare professional's instructions.      11/29/18 Urgent Care:     I do not see any evidence of bacterial infection to treat with antibiotics here today. The loss of smell and taste that came on last night with your sudden onset fever and nasal congestion is most likely to be related to a sudden onset viral respiratory infection.     However, if your smell and taste does not return, you need to follow-up with your primary care provider. You may need a head imaging study or a referral to see an ear, nose and throat doctor and/or a neurologist if your smell and taste remains absent/doesn't return.

## 2018-11-30 LAB
BACTERIA SPEC CULT: NORMAL
SPECIMEN SOURCE: NORMAL

## 2018-12-06 ENCOUNTER — NURSE TRIAGE (OUTPATIENT)
Dept: NURSING | Facility: CLINIC | Age: 28
End: 2018-12-06

## 2018-12-07 ENCOUNTER — NURSE TRIAGE (OUTPATIENT)
Dept: NURSING | Facility: CLINIC | Age: 28
End: 2018-12-07

## 2018-12-07 ENCOUNTER — OFFICE VISIT (OUTPATIENT)
Dept: FAMILY MEDICINE | Facility: CLINIC | Age: 28
End: 2018-12-07
Payer: COMMERCIAL

## 2018-12-07 ENCOUNTER — HOSPITAL ENCOUNTER (EMERGENCY)
Facility: CLINIC | Age: 28
Discharge: LEFT BEFORE DISCHARGE | End: 2018-12-08
Attending: EMERGENCY MEDICINE | Admitting: EMERGENCY MEDICINE
Payer: COMMERCIAL

## 2018-12-07 VITALS
TEMPERATURE: 98.7 F | WEIGHT: 195 LBS | DIASTOLIC BLOOD PRESSURE: 93 MMHG | OXYGEN SATURATION: 100 % | BODY MASS INDEX: 30.61 KG/M2 | HEIGHT: 67 IN | SYSTOLIC BLOOD PRESSURE: 146 MMHG

## 2018-12-07 VITALS
BODY MASS INDEX: 30.61 KG/M2 | WEIGHT: 195 LBS | TEMPERATURE: 97.2 F | HEIGHT: 67 IN | SYSTOLIC BLOOD PRESSURE: 134 MMHG | HEART RATE: 73 BPM | DIASTOLIC BLOOD PRESSURE: 92 MMHG

## 2018-12-07 DIAGNOSIS — Z12.4 SCREENING FOR MALIGNANT NEOPLASM OF CERVIX: ICD-10-CM

## 2018-12-07 DIAGNOSIS — R10.9 ABDOMINAL CRAMPING: ICD-10-CM

## 2018-12-07 DIAGNOSIS — R07.0 THROAT PAIN: ICD-10-CM

## 2018-12-07 DIAGNOSIS — Z11.51 SCREENING FOR HUMAN PAPILLOMAVIRUS: Primary | ICD-10-CM

## 2018-12-07 DIAGNOSIS — R10.32 LLQ ABDOMINAL PAIN: ICD-10-CM

## 2018-12-07 DIAGNOSIS — R10.12 LUQ ABDOMINAL PAIN: ICD-10-CM

## 2018-12-07 DIAGNOSIS — R11.2 INTRACTABLE VOMITING WITH NAUSEA, UNSPECIFIED VOMITING TYPE: ICD-10-CM

## 2018-12-07 DIAGNOSIS — R19.7 BLOODY DIARRHEA: ICD-10-CM

## 2018-12-07 DIAGNOSIS — S01.01XD LACERATION OF SCALP, SUBSEQUENT ENCOUNTER: ICD-10-CM

## 2018-12-07 LAB
ALBUMIN SERPL-MCNC: 4.4 G/DL (ref 3.4–5)
ALP SERPL-CCNC: 73 U/L (ref 40–150)
ALT SERPL W P-5'-P-CCNC: 47 U/L (ref 0–50)
ANION GAP SERPL CALCULATED.3IONS-SCNC: 8 MMOL/L (ref 3–14)
AST SERPL W P-5'-P-CCNC: 31 U/L (ref 0–45)
BASOPHILS # BLD AUTO: 0 10E9/L (ref 0–0.2)
BASOPHILS NFR BLD AUTO: 0.4 %
BILIRUB SERPL-MCNC: 0.6 MG/DL (ref 0.2–1.3)
BUN SERPL-MCNC: 13 MG/DL (ref 7–30)
CALCIUM SERPL-MCNC: 9.3 MG/DL (ref 8.5–10.1)
CHLORIDE SERPL-SCNC: 106 MMOL/L (ref 94–109)
CO2 SERPL-SCNC: 24 MMOL/L (ref 20–32)
CREAT SERPL-MCNC: 0.77 MG/DL (ref 0.52–1.04)
DEPRECATED S PYO AG THROAT QL EIA: NORMAL
DIFFERENTIAL METHOD BLD: NORMAL
EOSINOPHIL # BLD AUTO: 0.1 10E9/L (ref 0–0.7)
EOSINOPHIL NFR BLD AUTO: 0.5 %
ERYTHROCYTE [DISTWIDTH] IN BLOOD BY AUTOMATED COUNT: 13.5 % (ref 10–15)
GFR SERPL CREATININE-BSD FRML MDRD: 89 ML/MIN/1.7M2
GLUCOSE SERPL-MCNC: 84 MG/DL (ref 70–99)
HCT VFR BLD AUTO: 42.6 % (ref 35–47)
HGB BLD-MCNC: 14.6 G/DL (ref 11.7–15.7)
IMM GRANULOCYTES # BLD: 0 10E9/L (ref 0–0.4)
IMM GRANULOCYTES NFR BLD: 0.3 %
LYMPHOCYTES # BLD AUTO: 2.5 10E9/L (ref 0.8–5.3)
LYMPHOCYTES NFR BLD AUTO: 25.8 %
MCH RBC QN AUTO: 28.9 PG (ref 26.5–33)
MCHC RBC AUTO-ENTMCNC: 34.3 G/DL (ref 31.5–36.5)
MCV RBC AUTO: 84 FL (ref 78–100)
MONOCYTES # BLD AUTO: 0.5 10E9/L (ref 0–1.3)
MONOCYTES NFR BLD AUTO: 5.4 %
NEUTROPHILS # BLD AUTO: 6.7 10E9/L (ref 1.6–8.3)
NEUTROPHILS NFR BLD AUTO: 67.6 %
NRBC # BLD AUTO: 0 10*3/UL
NRBC BLD AUTO-RTO: 0 /100
PLATELET # BLD AUTO: 337 10E9/L (ref 150–450)
POTASSIUM SERPL-SCNC: 3.8 MMOL/L (ref 3.4–5.3)
PROT SERPL-MCNC: 8.7 G/DL (ref 6.8–8.8)
RBC # BLD AUTO: 5.05 10E12/L (ref 3.8–5.2)
SODIUM SERPL-SCNC: 138 MMOL/L (ref 133–144)
SPECIMEN SOURCE: NORMAL
WBC # BLD AUTO: 9.9 10E9/L (ref 4–11)

## 2018-12-07 PROCEDURE — 99214 OFFICE O/P EST MOD 30 MIN: CPT | Performed by: NURSE PRACTITIONER

## 2018-12-07 PROCEDURE — 87081 CULTURE SCREEN ONLY: CPT | Performed by: NURSE PRACTITIONER

## 2018-12-07 PROCEDURE — 99284 EMERGENCY DEPT VISIT MOD MDM: CPT | Mod: 25,27 | Performed by: EMERGENCY MEDICINE

## 2018-12-07 PROCEDURE — 25000128 H RX IP 250 OP 636: Performed by: EMERGENCY MEDICINE

## 2018-12-07 PROCEDURE — 96361 HYDRATE IV INFUSION ADD-ON: CPT | Performed by: EMERGENCY MEDICINE

## 2018-12-07 PROCEDURE — 99284 EMERGENCY DEPT VISIT MOD MDM: CPT | Mod: 25 | Performed by: EMERGENCY MEDICINE

## 2018-12-07 PROCEDURE — 96374 THER/PROPH/DIAG INJ IV PUSH: CPT | Performed by: EMERGENCY MEDICINE

## 2018-12-07 PROCEDURE — G0145 SCR C/V CYTO,THINLAYER,RESCR: HCPCS | Performed by: NURSE PRACTITIONER

## 2018-12-07 PROCEDURE — 80053 COMPREHEN METABOLIC PANEL: CPT | Performed by: EMERGENCY MEDICINE

## 2018-12-07 PROCEDURE — G0476 HPV COMBO ASSAY CA SCREEN: HCPCS | Performed by: NURSE PRACTITIONER

## 2018-12-07 PROCEDURE — 85025 COMPLETE CBC W/AUTO DIFF WBC: CPT | Performed by: EMERGENCY MEDICINE

## 2018-12-07 PROCEDURE — 87880 STREP A ASSAY W/OPTIC: CPT | Performed by: NURSE PRACTITIONER

## 2018-12-07 PROCEDURE — 87624 HPV HI-RISK TYP POOLED RSLT: CPT | Performed by: NURSE PRACTITIONER

## 2018-12-07 RX ORDER — TRAMADOL HYDROCHLORIDE 50 MG/1
50 TABLET ORAL EVERY 6 HOURS PRN
Qty: 30 TABLET | Refills: 0 | Status: SHIPPED | OUTPATIENT
Start: 2018-12-07 | End: 2018-12-24

## 2018-12-07 RX ORDER — CIPROFLOXACIN 500 MG/1
500 TABLET, FILM COATED ORAL 2 TIMES DAILY
Qty: 10 TABLET | Refills: 0 | Status: SHIPPED | OUTPATIENT
Start: 2018-12-07 | End: 2019-02-25

## 2018-12-07 RX ORDER — HALOPERIDOL 5 MG/ML
5 INJECTION INTRAMUSCULAR ONCE
Status: COMPLETED | OUTPATIENT
Start: 2018-12-07 | End: 2018-12-07

## 2018-12-07 RX ORDER — ONDANSETRON 4 MG/1
4 TABLET, ORALLY DISINTEGRATING ORAL EVERY 6 HOURS PRN
Qty: 30 TABLET | Refills: 0 | Status: SHIPPED | OUTPATIENT
Start: 2018-12-07 | End: 2018-12-20

## 2018-12-07 RX ADMIN — SODIUM CHLORIDE, POTASSIUM CHLORIDE, SODIUM LACTATE AND CALCIUM CHLORIDE 1000 ML: 600; 310; 30; 20 INJECTION, SOLUTION INTRAVENOUS at 23:08

## 2018-12-07 RX ADMIN — HALOPERIDOL LACTATE 5 MG: 5 INJECTION, SOLUTION INTRAMUSCULAR at 23:10

## 2018-12-07 NOTE — PROGRESS NOTES
"    SUBJECTIVE:   Ayana Prasad is a 28 year old female who presents to clinic today for the following health issues:      ED/UC Followup:    Facility:  Formerly named [facility]   Date of visit: 12/07/2018  Reason for visit: Stomach pain and head injury   Current Status: Patient said she is still not feeling well.      2. Discuss medication      3. Referral for upper endoscopy     Problem list and histories reviewed & adjusted, as indicated.  Additional history: as documented    Current Outpatient Medications   Medication Sig Dispense Refill     ciprofloxacin (CIPRO) 500 MG tablet Take 1 tablet (500 mg) by mouth 2 times daily for 5 days 10 tablet 0     ondansetron (ZOFRAN-ODT) 4 MG ODT tab Take 1 tablet (4 mg) by mouth every 6 hours as needed for nausea or vomiting 30 tablet 0     traMADol (ULTRAM) 50 MG tablet Take 1 tablet (50 mg) by mouth every 6 hours as needed for severe pain 30 tablet 0     tretinoin (RETIN-A) 0.05 % cream Apply topically daily       Allergies   Allergen Reactions     Adhesive Tape Hives     Prednisone Other (See Comments) and Hives     Suicidal ideation     Droperidol Anxiety       Reviewed and updated as needed this visit by clinical staff  Tobacco  Allergies  Med Hx  Surg Hx  Fam Hx  Soc Hx      Reviewed and updated as needed this visit by Provider         Last night started to have left abdomen pain. Went to ER around 130 AM and was discharged around 5 AM. Had CT of abdomen that was normal. This pain is different from kidney stone pain because is in the front and not the back. Is taking zofran with Dramamine and that does work some. Does have prescription for oxycodone but does not like to take it because causes nausea and makes feel out of it. No recent travel. No keisha lettuce. No new meds. Has not been around others that are ill. Did have fever a couple of days ago. Has been having hives off and on. Pain to left side is there all the time. \"Hit like a kidney stone and has not " "gone away since.\" Did pass out at home. Thinks was walking into bathroom and then when woke was in a pool of blood. Is currently living with wife and she was sleeping at that time. Both parents have crohn's. Does have laceration to scalp that was evaluated in the emergency department and determined did not need intervention.  Not currently bleeding.  Denies headache.  No dizziness or light head.  Last bloody stool was early this a.m.  Has not had any since left the emergency department.  Abdominal pain and nausea continues.    Is not seeing mental health, has not seen them in some time.     Reports has been seen by another provider, forgets which one and needs to get set up for an upper endoscopy.  This was recommended due to vomiting.  Will vomit randomly this is been going on for some time.  Does not notice any blood in vomit.  Is not related to certain foods or medications.  No recent travel.  Has not been around anyone else who is ill. Will have where is just once per day and other times will be multiple times per day.     Has appointment  With nephrologist in May. Did have a stent and that was removed due to infection.  Current pain that is having is different than kidney stone pain.  No difficulty starting urine stream.  Feels like is emptying bladder all the way.  No pain with urination.    Is agreeable to Pap smear today.    Reports that earlier in the week had been seen at urgent care and rapid strep came back negative but received a call 24 hours later saying was positive for strep.  Prescription was sent to pharmacy for antibiotic.  Reports did not pick it up because does not feel like has strep.  Currently, denies sore throat.      ROS:  Review of Systems   Constitutional: Positive for appetite change (decreased). Negative for fatigue.   HENT: Negative for ear pain, rhinorrhea and sore throat.    Eyes: Negative for visual disturbance.   Respiratory: Negative for cough, chest tightness, shortness of " "breath and wheezing.    Cardiovascular: Negative for chest pain, palpitations and leg swelling.   Gastrointestinal: Positive for abdominal pain (left side), diarrhea (bloody) and nausea. Negative for abdominal distention, constipation and vomiting.   Endocrine: Negative for cold intolerance and heat intolerance.   Musculoskeletal: Negative for arthralgias and myalgias.   Skin: Negative for rash.   Neurological: Negative for dizziness, light-headedness, numbness and headaches.   Psychiatric/Behavioral: Negative for dysphoric mood and sleep disturbance. The patient is not nervous/anxious.        OBJECTIVE:     BP (!) 134/92 (BP Location: Right arm, Patient Position: Sitting, Cuff Size: Adult Regular)   Pulse 73   Temp 97.2  F (36.2  C)   Ht 1.702 m (5' 7\")   Wt 88.5 kg (195 lb)   BMI 30.54 kg/m    Body mass index is 30.54 kg/m .  Physical Exam   Constitutional: She is oriented to person, place, and time. She appears ill.   HENT:   Head:       Mouth/Throat: Mucous membranes are normal. No oropharyngeal exudate, posterior oropharyngeal edema or posterior oropharyngeal erythema.   Pulmonary/Chest: Effort normal.   Abdominal: Bowel sounds are normal. There is tenderness in the left upper quadrant and left lower quadrant. There is guarding.       Genitourinary: Rectum normal, vagina normal and uterus normal. There is no rash or tenderness on the right labia. There is no rash or tenderness on the left labia. Cervix exhibits no motion tenderness and no discharge. Right adnexum displays no mass and no tenderness. Left adnexum displays no mass and no tenderness. No tenderness in the vagina. No vaginal discharge found.   Neurological: She is alert and oriented to person, place, and time.   Skin: Skin is warm.   Psychiatric: She has a normal mood and affect.       ASSESSMENT/PLAN:   1. Screening for human papillomavirus  - HPV High Risk Types DNA Cervical    2. Screening for malignant neoplasm of cervix  - Pap imaged thin " layer screen reflex to HPV if ASCUS - recommend age 25 - 29    3. Throat pain  No need for treatment.   - Rapid strep screen  - Beta strep group A culture    4. Bloody diarrhea  ED visit, labs and imaging reviewed.  CT unremarkable.  Will plan to treat empirically over the weekend with Cipro twice daily times 5 days.  Will need to set up for colonoscopy.  Stress need to obtain stool samples.  Do recommend obtaining stool samples prior to initiation of antibiotic.  Voices understanding.  Offered work note, declines states needs to go to work.  - ondansetron (ZOFRAN-ODT) 4 MG ODT tab; Take 1 tablet (4 mg) by mouth every 6 hours as needed for nausea or vomiting  Dispense: 30 tablet; Refill: 0  - Clostridium difficile toxin B PCR; Future  - Fecal Lactoferrin; Future  - Giardia antigen; Future  - Enteric Bacteria and Virus Panel by [facility] Stool; Future  - ciprofloxacin (CIPRO) 500 MG tablet; Take 1 tablet (500 mg) by mouth 2 times daily for 5 days  Dispense: 10 tablet; Refill: 0  - GASTROENTEROLOGY ADULT REF PROCEDURE ONLY  - Occult blood stool; Future    5. LLQ abdominal pain  ED visit, labs and imaging reviewed.  CT unremarkable.  Will plan to treat empirically over the weekend with Cipro twice daily times 5 days.  Will need to set up for colonoscopy.  Stress need to obtain stool samples.  Do recommend obtaining stool samples prior to initiation of antibiotic.  Voices understanding.  - GASTROENTEROLOGY ADULT REF PROCEDURE ONLY  - traMADol (ULTRAM) 50 MG tablet; Take 1 tablet (50 mg) by mouth every 6 hours as needed for severe pain  Dispense: 30 tablet; Refill: 0    6. LUQ abdominal pain  ED visit, labs and imaging reviewed.  CT unremarkable.  Will plan to treat empirically over the weekend with Cipro twice daily times 5 days.  Will need to set up for colonoscopy.  Stress need to obtain stool samples.  Do recommend obtaining stool samples prior to initiation of antibiotic.  Voices understanding.  - GASTROENTEROLOGY ADULT  REF PROCEDURE ONLY  - traMADol (ULTRAM) 50 MG tablet; Take 1 tablet (50 mg) by mouth every 6 hours as needed for severe pain  Dispense: 30 tablet; Refill: 0    7. Intractable vomiting with nausea, unspecified vomiting type   Will order per request   - GASTROENTEROLOGY ADULT REF PROCEDURE ONLY    8. Laceration of scalp, subsequent encounter  Educated on care of area    [place] Cruz [facility]

## 2018-12-07 NOTE — TELEPHONE ENCOUNTER
Additional Information    Negative: [1] Caller is not with the adult (patient) AND [2] reporting urgent symptoms    Negative: Lab result questions    Negative: Medication questions    Negative: Caller cannot be reached by phone    Negative: Caller has already spoken to PCP or another triager    Negative: RN needs further essential information from caller in order to complete triage    Negative: Requesting regular office appointment    Negative: [1] Caller requesting NON-URGENT health information AND [2] PCP's office is the best resource    Health Information question, no triage required and triager able to answer question    Protocols used: INFORMATION ONLY CALL-ADULT-MARIA EUGENIA Piña calls and says that she took her Zofran, Cipro, and Tramadol at 3 pm today. Pt. Says that she vomited at 4 pm. Pt. Wants to know if she vomited the medications up or if she can take the medications again now. RN then referred pt. To a [facility], for further assistance.

## 2018-12-07 NOTE — MR AVS SNAPSHOT
After Visit Summary   12/7/2018    Ayana Prasad    MRN: [id number]           Patient Information     Date Of Birth          1990        Visit Information        Provider Department      12/7/2018 11:20 AM Becki Avery APRN CNP [facility]        Today's Diagnoses     Screening for human papillomavirus    -  1    Nephrolithiasis        Screening for malignant neoplasm of cervix        Throat pain        Bloody diarrhea        LLQ abdominal pain        LUQ abdominal pain        Intractable vomiting with nausea, unspecified vomiting type           Follow-ups after your visit        Additional Services     GASTROENTEROLOGY ADULT REF PROCEDURE ONLY       Last Lab Result: Creatinine (mg/dL)       Date                     Value                 10/24/2018               0.99             ----------  Body mass index is 30.54 kg/(m^2).     Needed:  No  Language:  English    Patient will be contacted to schedule procedure.     Please be aware that coverage of these services is subject to the terms and limitations of your health insurance plan.  Call member services at your health plan with any benefit or coverage questions.  Any procedures must be performed at a [place] facility OR coordinated by your clinic's referral office.    Please bring the following with you to your appointment:    (1) Any X-Rays, CTs or MRIs which have been performed.  Contact the facility where they were done to arrange for  prior to your scheduled appointment.    (2) List of current medications   (3) This referral request   (4) Any documents/labs given to you for this referral                  Your next 10 appointments already scheduled     May 21, 2019  1:20 PM CDT   (Arrive by 1:05 PM)   New Patient Visit with Vero Carmen MD   [facility] ([facility])    [address] [place] [address]  [facility]  [id number] [phone]              Future tests that were ordered for you today     Open Future Orders        Priority Expected Expires Ordered    Clostridium difficile toxin B PCR Routine  12/14/2018 12/7/2018    Fecal Lactoferrin Routine  12/7/2019 12/7/2018    Giardia antigen Routine  12/7/2019 12/7/2018    Enteric Bacteria and Virus Panel by [facility]E Stool Routine  12/7/2019 12/7/2018            Who to contact     Normal or non-critical lab and imaging results will be communicated to you by [employer]oh[employer], letter or phone within 4 business days after the clinic has received the results. If you do not hear from us within 7 days, please contact the clinic through [employer] or phone. If you have a critical or abnormal lab result, we will notify you by phone as soon as possible.  Submit refill requests through [employer] or call your pharmacy and they will forward the refill request to us. Please allow 3 business days for your refill to be completed.          If you need to speak with a  for additional information , please call: [phone]           Additional Information About Your Visit        [employer] Information     [employer] gives you secure access to your electronic health record. If you see a primary care provider, you can also send messages to your care team and make appointments. If you have questions, please call your primary care clinic.  If you do not have a primary care provider, please call [phone] and they will assist you.        Care EveryWhere ID     This is your Care EveryWhere ID. This could be used by other organizations to access your [facility] medical records  [id number]        Your Vitals Were     Pulse Temperature Height BMI (Body Mass Index)          73 97.2  F (36.2  C) 5' 7\" (1.702 m) 30.54 kg/m2         Blood Pressure from Last 3 Encounters:   12/07/18 (!) 134/92   11/29/18 122/86   11/05/18 135/84    Weight from Last 3 Encounters:   12/07/18 195 lb (88.5 kg)   11/05/18 190 lb " (86.2 kg)   10/16/18 190 lb (86.2 kg)              We Performed the Following     Beta strep group A culture     GASTROENTEROLOGY ADULT REF PROCEDURE ONLY     HPV High Risk Types DNA Cervical     Occult blood stool     Pap imaged thin layer screen reflex to HPV if ASCUS - recommend age 25 - 29     Rapid strep screen          Today's Medication Changes          These changes are accurate as of 12/7/18 12:14 PM.  If you have any questions, ask your nurse or doctor.               Start taking these medicines.        Dose/Directions    ciprofloxacin 500 MG tablet   Commonly known as:  CIPRO   Used for:  Bloody diarrhea   Started by:  Becki Avery APRN CNP        Dose:  500 mg   Take 1 tablet (500 mg) by mouth 2 times daily for 5 days   Quantity:  10 tablet   Refills:  0            Where to get your medicines      These medications were sent to [facility] [address] [facility] [address]  [address] [facility] [address]     Phone:  [phone]     ciprofloxacin 500 MG tablet    ondansetron 4 MG ODT tab                Primary Care Provider Office Phone # Fax #    Ceci Underwood [phone] [address] [facility] [address]        Equal Access to Services     PIPPA KOCH AH: Hadii dale abrahamo Sohéctor, waaxda luqadaha, qaybta kaalmada adeegyada, fozia elizabeth. So [facility] [phone].    ATENCIÓN: Si habla español, tiene a xiong disposición servicios gratuitos de asistencia lingüística. Llame al [phone].    We comply with applicable federal civil rights laws and [place] laws. We do not discriminate on the basis of race, color, national origin, age, disability, sex, sexual orientation, or gender identity.            Thank you!     Thank you for choosing [facility]  for your care. Our goal is always to provide you with excellent care. Hearing back from our patients is one way we can continue to  improve our services. Please take a few minutes to complete the written survey that you may receive in the mail after your visit with us. Thank you!             Your Updated Medication List - Protect others around you: Learn how to safely use, store and throw away your medicines at www.disposemymeds.org.          This list is accurate as of 12/7/18 12:14 PM.  Always use your most recent med list.                   Brand Name Dispense Instructions for use Diagnosis    ciprofloxacin 500 MG tablet    CIPRO    10 tablet    Take 1 tablet (500 mg) by mouth 2 times daily for 5 days    Bloody diarrhea       ondansetron 4 MG ODT tab    ZOFRAN-ODT    30 tablet    Take 1 tablet (4 mg) by mouth every 6 hours as needed for nausea or vomiting    Nephrolithiasis       tretinoin 0.05 % external cream    RETIN-A     Apply topically daily

## 2018-12-08 LAB
BACTERIA SPEC CULT: NORMAL
SPECIMEN SOURCE: NORMAL

## 2018-12-08 ASSESSMENT — ENCOUNTER SYMPTOMS
SORE THROAT: 0
SHORTNESS OF BREATH: 0
APPETITE CHANGE: 1
NAUSEA: 1
ABDOMINAL PAIN: 1
RHINORRHEA: 0
BLOOD IN STOOL: 1
NUMBNESS: 0
CHILLS: 1
VOMITING: 1
DIARRHEA: 1
FEVER: 1
LIGHT-HEADEDNESS: 1
COUGH: 0
HEADACHES: 0
WEAKNESS: 0
FATIGUE: 1
ABDOMINAL DISTENTION: 0
DYSURIA: 0

## 2018-12-08 NOTE — ED NOTES
Pt at the exit fully dressed.  Pt declines to speak with the MD.  Pt IV then removed and left without medical advice form signed
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
## 2018-12-09 ASSESSMENT — ENCOUNTER SYMPTOMS
NAUSEA: 1
WHEEZING: 0
DIARRHEA: 1
SLEEP DISTURBANCE: 0
HEADACHES: 0
PALPITATIONS: 0
COUGH: 0
RHINORRHEA: 0
NUMBNESS: 0
ARTHRALGIAS: 0
DIZZINESS: 0
MYALGIAS: 0
APPETITE CHANGE: 1
ABDOMINAL PAIN: 1
SHORTNESS OF BREATH: 0
ABDOMINAL DISTENTION: 0
CHEST TIGHTNESS: 0
SORE THROAT: 0
DYSPHORIC MOOD: 0
VOMITING: 0
CONSTIPATION: 0
FATIGUE: 0
NERVOUS/ANXIOUS: 0
LIGHT-HEADEDNESS: 0

## 2018-12-10 ENCOUNTER — MYC MEDICAL ADVICE (OUTPATIENT)
Dept: OTHER | Age: 28
End: 2018-12-10

## 2018-12-10 DIAGNOSIS — L50.9 URTICARIA: Primary | ICD-10-CM

## 2018-12-10 RX ORDER — HYDROXYZINE HYDROCHLORIDE 25 MG/1
25 TABLET, FILM COATED ORAL EVERY 6 HOURS PRN
Qty: 30 TABLET | Refills: 0 | Status: SHIPPED | OUTPATIENT
Start: 2018-12-10 | End: 2019-02-25

## 2018-12-10 NOTE — TELEPHONE ENCOUNTER
Attempted to call Ayana, no answer.  Message left to return call to clinic.  Please triage due to hives.    Becki CARVALHOC

## 2018-12-10 NOTE — TELEPHONE ENCOUNTER
Patient states she has had hives on her neck and chest for the past 2 days now. They are itchy and red. She has tried Benadryl but that is not helping. She denies any shortness of breath or throat swelling. She has not tried any new soaps or detergents. I offered her an appointment with Dr. Keller this afternoon but she states she is on a restricted insurance and so Becki Avery is the only one that can write prescriptions for her. I will forward this message to her.     Jyoti Hollingsworth RN

## 2018-12-10 NOTE — TELEPHONE ENCOUNTER
Call returned to Ayana.  No new medications, no new foods, no change in products.  No shortness of breath, difficulty breathing, does not feel like throat is swollen, no wheezing.  Hives to neck, chest, back, arms and hands.  Hives first started on Thursday.  Is no longer having any bloody stools.  Does feel like Cipro helped.  Diarrhea is gone.  Was not able to obtain stool samples.  Pain to abdomen is some last.  Still having some nausea.  Has listed allergy of prednisone.  Is agreeable to coming to clinic tomorrow for 6 mg oral or IM Decadron.  Educated regarding possible side effects.  Will send prescription to [place] pharmacy for hydroxyzine.  May take over-the-counter Benadryl 50 mg.  Encouraged to start taking Zyrtec.  May need to refer to allergy in the future.  Educated regarding warning signs to watch for and when would need to seek immediate medical attention.    Becki Avery NP-C

## 2018-12-11 ENCOUNTER — ALLIED HEALTH/NURSE VISIT (OUTPATIENT)
Dept: FAMILY MEDICINE | Facility: CLINIC | Age: 28
End: 2018-12-11
Payer: COMMERCIAL

## 2018-12-11 DIAGNOSIS — L50.9 HIVES: Primary | ICD-10-CM

## 2018-12-11 LAB
COPATH REPORT: NORMAL
PAP: NORMAL

## 2018-12-11 PROCEDURE — 96372 THER/PROPH/DIAG INJ SC/IM: CPT

## 2018-12-11 PROCEDURE — 99207 ZZC NO CHARGE NURSE ONLY: CPT

## 2018-12-11 RX ORDER — DEXAMETHASONE SODIUM PHOSPHATE 4 MG/ML
6 INJECTION, SOLUTION INTRA-ARTICULAR; INTRALESIONAL; INTRAMUSCULAR; INTRAVENOUS; SOFT TISSUE ONCE
Status: DISCONTINUED | OUTPATIENT
Start: 2018-12-11 | End: 2018-12-11

## 2018-12-11 RX ORDER — DEXAMETHASONE SODIUM PHOSPHATE 4 MG/ML
6 INJECTION, SOLUTION INTRA-ARTICULAR; INTRALESIONAL; INTRAMUSCULAR; INTRAVENOUS; SOFT TISSUE ONCE
Status: COMPLETED | OUTPATIENT
Start: 2018-12-11 | End: 2018-12-11

## 2018-12-11 RX ORDER — DEXAMETHASONE SODIUM PHOSPHATE 10 MG/ML
6 INJECTION, SOLUTION INTRAMUSCULAR; INTRAVENOUS ONCE
Status: DISCONTINUED | OUTPATIENT
Start: 2018-12-11 | End: 2018-12-11

## 2018-12-11 RX ADMIN — DEXAMETHASONE SODIUM PHOSPHATE 6 MG: 4 INJECTION, SOLUTION INTRA-ARTICULAR; INTRALESIONAL; INTRAMUSCULAR; INTRAVENOUS; SOFT TISSUE at 10:25

## 2018-12-11 NOTE — PROGRESS NOTES
Prior to injection, verified patient identity using patient's name and date of birth.  Due to injection administration, patient instructed to remain in clinic for 15 minutes  afterwards, and to report any adverse reaction to me immediately.    Decadron 6mg (10mg/1mL)    Drug Amount Wasted:  0.4 mL  Vial/Syringe: Single dose vial     Unable to find matching NDC for Decadron 10mg/mL concentration in MAR. Medication documented in MAR is not what was given. Had to document Decadron in MAR as ordered at 4mg/mL with that matching NDC in order to close encounter. NDC is a hard stop in MAR.      Correct medication given as follows:     MEDICATION: Decadron 10mg/mL IM   ROUTE: IM  SITE: Ventrogluteal - Left  DOSE: 6mg  LOT #: [id number]  :  [employer]  EXPIRATION DATE:  2/1/2020  NDC#: 7138-6246-51     April ASHLYN Price

## 2018-12-12 PROCEDURE — 82272 OCCULT BLD FECES 1-3 TESTS: CPT | Performed by: NURSE PRACTITIONER

## 2018-12-12 PROCEDURE — 87506 IADNA-DNA/RNA PROBE TQ 6-11: CPT | Performed by: NURSE PRACTITIONER

## 2018-12-12 PROCEDURE — 83630 LACTOFERRIN FECAL (QUAL): CPT | Performed by: NURSE PRACTITIONER

## 2018-12-12 PROCEDURE — 87493 C DIFF AMPLIFIED PROBE: CPT | Performed by: NURSE PRACTITIONER

## 2018-12-12 PROCEDURE — 87329 GIARDIA AG IA: CPT | Performed by: NURSE PRACTITIONER

## 2018-12-13 DIAGNOSIS — R19.7 BLOODY DIARRHEA: ICD-10-CM

## 2018-12-13 LAB
C COLI+JEJUNI+LARI FUSA STL QL NAA+PROBE: NOT DETECTED
C DIFF TOX B STL QL: POSITIVE
EC STX1 GENE STL QL NAA+PROBE: NOT DETECTED
EC STX2 GENE STL QL NAA+PROBE: NOT DETECTED
ENTERIC PATHOGEN COMMENT: NORMAL
FINAL DIAGNOSIS: NORMAL
HEMOCCULT STL QL: NEGATIVE
HPV HR 12 DNA CVX QL NAA+PROBE: NEGATIVE
HPV16 DNA SPEC QL NAA+PROBE: NEGATIVE
HPV18 DNA SPEC QL NAA+PROBE: NEGATIVE
NOROV GI+II ORF1-ORF2 JNC STL QL NAA+PR: NOT DETECTED
RVA NSP5 STL QL NAA+PROBE: NOT DETECTED
SALMONELLA SP RPOD STL QL NAA+PROBE: NOT DETECTED
SHIGELLA SP+EIEC IPAH STL QL NAA+PROBE: NOT DETECTED
SPECIMEN DESCRIPTION: NORMAL
SPECIMEN SOURCE CVX/VAG CYTO: NORMAL
SPECIMEN SOURCE: ABNORMAL
V CHOL+PARA RFBL+TRKH+TNAA STL QL NAA+PR: NOT DETECTED
Y ENTERO RECN STL QL NAA+PROBE: NOT DETECTED

## 2018-12-14 ENCOUNTER — HOSPITAL ENCOUNTER (EMERGENCY)
Facility: CLINIC | Age: 28
Discharge: HOME | End: 2018-12-15
Attending: EMERGENCY MEDICINE | Admitting: EMERGENCY MEDICINE
Payer: COMMERCIAL

## 2018-12-14 VITALS
WEIGHT: 195 LBS | RESPIRATION RATE: 16 BRPM | DIASTOLIC BLOOD PRESSURE: 98 MMHG | BODY MASS INDEX: 30.61 KG/M2 | HEIGHT: 67 IN | SYSTOLIC BLOOD PRESSURE: 147 MMHG | TEMPERATURE: 97.9 F | OXYGEN SATURATION: 96 %

## 2018-12-14 DIAGNOSIS — A49.8 CLOSTRIDIUM DIFFICILE INFECTION: Primary | ICD-10-CM

## 2018-12-14 DIAGNOSIS — R11.11 VOMITING WITHOUT NAUSEA, INTRACTABILITY OF VOMITING NOT SPECIFIED, UNSPECIFIED VOMITING TYPE: ICD-10-CM

## 2018-12-14 DIAGNOSIS — A04.72 COLITIS DUE TO CLOSTRIDIUM DIFFICILE: ICD-10-CM

## 2018-12-14 LAB
ALBUMIN SERPL-MCNC: 3.8 G/DL (ref 3.4–5)
ALP SERPL-CCNC: 65 U/L (ref 40–150)
ALT SERPL W P-5'-P-CCNC: 34 U/L (ref 0–50)
ANION GAP SERPL CALCULATED.3IONS-SCNC: 6 MMOL/L (ref 3–14)
AST SERPL W P-5'-P-CCNC: 20 U/L (ref 0–45)
BASOPHILS # BLD AUTO: 0.1 10E9/L (ref 0–0.2)
BASOPHILS NFR BLD AUTO: 0.8 %
BILIRUB SERPL-MCNC: 0.2 MG/DL (ref 0.2–1.3)
BUN SERPL-MCNC: 16 MG/DL (ref 7–30)
CALCIUM SERPL-MCNC: 9.4 MG/DL (ref 8.5–10.1)
CHLORIDE SERPL-SCNC: 107 MMOL/L (ref 94–109)
CO2 SERPL-SCNC: 25 MMOL/L (ref 20–32)
CREAT SERPL-MCNC: 0.8 MG/DL (ref 0.52–1.04)
DIFFERENTIAL METHOD BLD: ABNORMAL
EOSINOPHIL # BLD AUTO: 0.1 10E9/L (ref 0–0.7)
EOSINOPHIL NFR BLD AUTO: 1 %
ERYTHROCYTE [DISTWIDTH] IN BLOOD BY AUTOMATED COUNT: 13.9 % (ref 10–15)
G LAMBLIA AG STL QL IA: NORMAL
GFR SERPL CREATININE-BSD FRML MDRD: 85 ML/MIN/1.7M2
GLUCOSE SERPL-MCNC: 87 MG/DL (ref 70–99)
HCG SERPL QL: NEGATIVE
HCT VFR BLD AUTO: 42.3 % (ref 35–47)
HGB BLD-MCNC: 14.3 G/DL (ref 11.7–15.7)
IMM GRANULOCYTES # BLD: 0.1 10E9/L (ref 0–0.4)
IMM GRANULOCYTES NFR BLD: 0.4 %
LACTATE BLD-SCNC: 1.4 MMOL/L (ref 0.7–2)
LIPASE SERPL-CCNC: 489 U/L (ref 73–393)
LYMPHOCYTES # BLD AUTO: 3.4 10E9/L (ref 0.8–5.3)
LYMPHOCYTES NFR BLD AUTO: 28.7 %
MCH RBC QN AUTO: 28.7 PG (ref 26.5–33)
MCHC RBC AUTO-ENTMCNC: 33.8 G/DL (ref 31.5–36.5)
MCV RBC AUTO: 85 FL (ref 78–100)
MONOCYTES # BLD AUTO: 0.8 10E9/L (ref 0–1.3)
MONOCYTES NFR BLD AUTO: 6.3 %
NEUTROPHILS # BLD AUTO: 7.5 10E9/L (ref 1.6–8.3)
NEUTROPHILS NFR BLD AUTO: 62.8 %
NRBC # BLD AUTO: 0 10*3/UL
NRBC BLD AUTO-RTO: 0 /100
PLATELET # BLD AUTO: 358 10E9/L (ref 150–450)
POTASSIUM SERPL-SCNC: 3.9 MMOL/L (ref 3.4–5.3)
PROT SERPL-MCNC: 7.8 G/DL (ref 6.8–8.8)
RBC # BLD AUTO: 4.99 10E12/L (ref 3.8–5.2)
SODIUM SERPL-SCNC: 138 MMOL/L (ref 133–144)
SPECIMEN SOURCE: NORMAL
WBC # BLD AUTO: 12 10E9/L (ref 4–11)

## 2018-12-14 PROCEDURE — 25000132 ZZH RX MED GY IP 250 OP 250 PS 637: Performed by: EMERGENCY MEDICINE

## 2018-12-14 PROCEDURE — 25000125 ZZHC RX 250: Performed by: EMERGENCY MEDICINE

## 2018-12-14 PROCEDURE — 96374 THER/PROPH/DIAG INJ IV PUSH: CPT

## 2018-12-14 PROCEDURE — 85025 COMPLETE CBC W/AUTO DIFF WBC: CPT | Performed by: EMERGENCY MEDICINE

## 2018-12-14 PROCEDURE — 83605 ASSAY OF LACTIC ACID: CPT | Performed by: EMERGENCY MEDICINE

## 2018-12-14 PROCEDURE — 25000128 H RX IP 250 OP 636: Performed by: EMERGENCY MEDICINE

## 2018-12-14 PROCEDURE — 83690 ASSAY OF LIPASE: CPT | Performed by: EMERGENCY MEDICINE

## 2018-12-14 PROCEDURE — 80053 COMPREHEN METABOLIC PANEL: CPT | Performed by: EMERGENCY MEDICINE

## 2018-12-14 PROCEDURE — 99285 EMERGENCY DEPT VISIT HI MDM: CPT | Mod: 25

## 2018-12-14 PROCEDURE — 96361 HYDRATE IV INFUSION ADD-ON: CPT

## 2018-12-14 PROCEDURE — 84703 CHORIONIC GONADOTROPIN ASSAY: CPT | Performed by: EMERGENCY MEDICINE

## 2018-12-14 PROCEDURE — 96375 TX/PRO/DX INJ NEW DRUG ADDON: CPT

## 2018-12-14 RX ORDER — SODIUM CHLORIDE 9 MG/ML
1000 INJECTION, SOLUTION INTRAVENOUS CONTINUOUS
Status: DISCONTINUED | OUTPATIENT
Start: 2018-12-14 | End: 2018-12-15 | Stop reason: HOSPADM

## 2018-12-14 RX ORDER — ONDANSETRON 2 MG/ML
4 INJECTION INTRAMUSCULAR; INTRAVENOUS EVERY 30 MIN PRN
Status: DISCONTINUED | OUTPATIENT
Start: 2018-12-14 | End: 2018-12-15 | Stop reason: HOSPADM

## 2018-12-14 RX ORDER — DICYCLOMINE HCL 20 MG
20 TABLET ORAL ONCE
Status: COMPLETED | OUTPATIENT
Start: 2018-12-14 | End: 2018-12-14

## 2018-12-14 RX ORDER — DIPHENHYDRAMINE HYDROCHLORIDE 50 MG/ML
50 INJECTION INTRAMUSCULAR; INTRAVENOUS ONCE
Status: COMPLETED | OUTPATIENT
Start: 2018-12-14 | End: 2018-12-14

## 2018-12-14 RX ORDER — METOCLOPRAMIDE HYDROCHLORIDE 5 MG/ML
5 INJECTION INTRAMUSCULAR; INTRAVENOUS ONCE
Status: COMPLETED | OUTPATIENT
Start: 2018-12-14 | End: 2018-12-14

## 2018-12-14 RX ORDER — VANCOMYCIN HYDROCHLORIDE 125 MG/1
125 CAPSULE ORAL 4 TIMES DAILY
Qty: 40 CAPSULE | Refills: 0 | Status: SHIPPED | OUTPATIENT
Start: 2018-12-14 | End: 2019-02-25 | Stop reason: ALTCHOICE

## 2018-12-14 RX ORDER — LORAZEPAM 2 MG/ML
1 INJECTION INTRAMUSCULAR ONCE
Status: COMPLETED | OUTPATIENT
Start: 2018-12-14 | End: 2018-12-14

## 2018-12-14 RX ORDER — METOCLOPRAMIDE 10 MG/1
10 TABLET ORAL
Qty: 40 TABLET | Refills: 0 | Status: SHIPPED | OUTPATIENT
Start: 2018-12-14 | End: 2018-12-17

## 2018-12-14 RX ORDER — METOCLOPRAMIDE HYDROCHLORIDE 5 MG/ML
5 INJECTION INTRAMUSCULAR; INTRAVENOUS ONCE
Status: DISCONTINUED | OUTPATIENT
Start: 2018-12-14 | End: 2018-12-14 | Stop reason: CLARIF

## 2018-12-14 RX ADMIN — DICYCLOMINE HYDROCHLORIDE 20 MG: 20 TABLET ORAL at 23:05

## 2018-12-14 RX ADMIN — ONDANSETRON 4 MG: 2 INJECTION INTRAMUSCULAR; INTRAVENOUS at 23:06

## 2018-12-14 RX ADMIN — LORAZEPAM 1 MG: 2 INJECTION INTRAMUSCULAR; INTRAVENOUS at 23:06

## 2018-12-14 RX ADMIN — DIPHENHYDRAMINE HYDROCHLORIDE 50 MG: 50 INJECTION, SOLUTION INTRAMUSCULAR; INTRAVENOUS at 23:06

## 2018-12-14 RX ADMIN — METOCLOPRAMIDE 5 MG: 5 INJECTION, SOLUTION INTRAMUSCULAR; INTRAVENOUS at 23:32

## 2018-12-14 RX ADMIN — FAMOTIDINE 20 MG: 10 INJECTION, SOLUTION INTRAVENOUS at 23:32

## 2018-12-14 RX ADMIN — SODIUM CHLORIDE 1000 ML: 9 INJECTION, SOLUTION INTRAVENOUS at 22:21

## 2018-12-14 ASSESSMENT — ENCOUNTER SYMPTOMS
VOMITING: 1
DIARRHEA: 1
ABDOMINAL PAIN: 1
NAUSEA: 1

## 2018-12-14 ASSESSMENT — MIFFLIN-ST. JEOR: SCORE: 1647.14

## 2018-12-14 NOTE — ED AVS SNAPSHOT
[facility] Emergency Department  [address]  [facility] [address]  Phone:  [phone]  Fax:  [phone]                                    Ayana Prasad   MRN: [id number]    Department:  [facility] Emergency Department   Date of Visit:  12/14/2018           After Visit Summary Signature Page    I have received my discharge instructions, and my questions have been answered. I have discussed any challenges I see with this plan with the nurse or doctor.    ..........................................................................................................................................  Patient/Patient Representative Signature      ..........................................................................................................................................  Patient Representative Print Name and Relationship to Patient    ..................................................               ................................................  Date                                   Time    ..........................................................................................................................................  Reviewed by Signature/Title    ...................................................              ..............................................  Date                                               Time          22EPIC Rev 08/18

## 2018-12-15 NOTE — ED TRIAGE NOTES
Pt confirmed Cdiff today.  Has been having abd pain, nausea and vomiting today.  Unable to keep antibiotics down even with Zofran.
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
## 2018-12-17 DIAGNOSIS — R11.2 NAUSEA AND VOMITING, INTRACTABILITY OF VOMITING NOT SPECIFIED, UNSPECIFIED VOMITING TYPE: Primary | ICD-10-CM

## 2018-12-17 RX ORDER — METOCLOPRAMIDE 10 MG/1
10 TABLET ORAL
Qty: 40 TABLET | Refills: 0 | Status: SHIPPED | OUTPATIENT
Start: 2018-12-17 | End: 2019-02-07

## 2018-12-19 RX ORDER — DULOXETIN HYDROCHLORIDE 20 MG/1
30 CAPSULE, DELAYED RELEASE ORAL
COMMUNITY
End: 2018-12-26

## 2018-12-19 RX ORDER — ARIPIPRAZOLE 10 MG/1
TABLET ORAL
COMMUNITY
Start: 2017-10-24 | End: 2018-12-20

## 2018-12-19 RX ORDER — BACLOFEN 10 MG/1
10 TABLET ORAL
COMMUNITY
Start: 2017-01-31 | End: 2018-12-20

## 2018-12-19 RX ORDER — CYCLOBENZAPRINE HCL 10 MG
10 TABLET ORAL
COMMUNITY
Start: 2017-08-11 | End: 2018-12-20

## 2018-12-19 RX ORDER — ARIPIPRAZOLE 15 MG/1
TABLET ORAL
Refills: 0 | COMMUNITY
Start: 2018-03-30 | End: 2018-12-20

## 2018-12-19 RX ORDER — ENOXAPARIN SODIUM 300 MG/3ML
40 INJECTION INTRAVENOUS; SUBCUTANEOUS
COMMUNITY
Start: 2017-01-20 | End: 2018-12-20

## 2018-12-20 ENCOUNTER — ANESTHESIA EVENT (OUTPATIENT)
Dept: GASTROENTEROLOGY | Facility: CLINIC | Age: 28
End: 2018-12-20
Payer: COMMERCIAL

## 2018-12-20 ENCOUNTER — MYC REFILL (OUTPATIENT)
Dept: OTHER | Age: 28
End: 2018-12-20

## 2018-12-20 DIAGNOSIS — R10.12 LUQ ABDOMINAL PAIN: ICD-10-CM

## 2018-12-20 DIAGNOSIS — R10.32 LLQ ABDOMINAL PAIN: ICD-10-CM

## 2018-12-20 DIAGNOSIS — R19.7 BLOODY DIARRHEA: ICD-10-CM

## 2018-12-20 ASSESSMENT — LIFESTYLE VARIABLES: TOBACCO_USE: 1
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
## 2018-12-21 ENCOUNTER — TELEPHONE (OUTPATIENT)
Dept: FAMILY MEDICINE | Facility: CLINIC | Age: 28
End: 2018-12-21

## 2018-12-21 RX ORDER — ONDANSETRON 4 MG/1
4 TABLET, ORALLY DISINTEGRATING ORAL EVERY 6 HOURS PRN
Qty: 30 TABLET | Refills: 0 | Status: SHIPPED | OUTPATIENT
Start: 2018-12-21 | End: 2018-12-26

## 2018-12-21 RX ORDER — TRAMADOL HYDROCHLORIDE 50 MG/1
50 TABLET ORAL EVERY 6 HOURS PRN
Qty: 30 TABLET | Refills: 0 | OUTPATIENT
Start: 2018-12-21

## 2018-12-21 NOTE — TELEPHONE ENCOUNTER
Received a Third Party rejection from the [facility] for Ondansetron ODT 4mg    Routed request to the [employer] epa team        Ham Patiño RT (r)  [facility]

## 2018-12-21 NOTE — TELEPHONE ENCOUNTER
Please call pt.  I refilled zofran but declined tramadol.  Pt was given tramadol for abd pain but was subsequently diagnosed with C diff.  Should now have been on vanc since 12/14 and I would expect her pain to be improved.  If not she needs reevaluation.    I see she has a colonoscopy scheduled for 12/24.  I would not recommend that she proceed with colonoscopy at this time given her recent infection.    Jodie Zhu

## 2018-12-24 ENCOUNTER — HOSPITAL ENCOUNTER (OUTPATIENT)
Facility: CLINIC | Age: 28
Discharge: HOME | End: 2018-12-24
Attending: SURGERY | Admitting: SURGERY
Payer: COMMERCIAL

## 2018-12-24 ENCOUNTER — TELEPHONE (OUTPATIENT)
Dept: FAMILY MEDICINE | Facility: CLINIC | Age: 28
End: 2018-12-24

## 2018-12-24 ENCOUNTER — ANESTHESIA (OUTPATIENT)
Dept: GASTROENTEROLOGY | Facility: CLINIC | Age: 28
End: 2018-12-24
Payer: COMMERCIAL

## 2018-12-24 VITALS
SYSTOLIC BLOOD PRESSURE: 115 MMHG | RESPIRATION RATE: 16 BRPM | BODY MASS INDEX: 30.61 KG/M2 | WEIGHT: 195 LBS | TEMPERATURE: 99 F | DIASTOLIC BLOOD PRESSURE: 70 MMHG | HEIGHT: 67 IN | OXYGEN SATURATION: 98 % | HEART RATE: 87 BPM

## 2018-12-24 DIAGNOSIS — R10.32 LLQ ABDOMINAL PAIN: ICD-10-CM

## 2018-12-24 DIAGNOSIS — R10.12 LUQ ABDOMINAL PAIN: ICD-10-CM

## 2018-12-24 LAB — UPPER GI ENDOSCOPY: NORMAL

## 2018-12-24 PROCEDURE — 99207 ZZC NO DOCUMENTATION ON VISIT: CPT | Performed by: INTERNAL MEDICINE

## 2018-12-24 PROCEDURE — 25000132 ZZH RX MED GY IP 250 OP 250 PS 637: Performed by: SURGERY

## 2018-12-24 PROCEDURE — 25000128 H RX IP 250 OP 636: Performed by: NURSE ANESTHETIST, CERTIFIED REGISTERED

## 2018-12-24 PROCEDURE — 37000008 ZZH ANESTHESIA TECHNICAL FEE, 1ST 30 MIN: Performed by: SURGERY

## 2018-12-24 PROCEDURE — 25000125 ZZHC RX 250: Performed by: NURSE ANESTHETIST, CERTIFIED REGISTERED

## 2018-12-24 PROCEDURE — 25000128 H RX IP 250 OP 636: Performed by: SURGERY

## 2018-12-24 PROCEDURE — 88305 TISSUE EXAM BY PATHOLOGIST: CPT | Mod: 26 | Performed by: SURGERY

## 2018-12-24 PROCEDURE — 25000125 ZZHC RX 250: Performed by: SURGERY

## 2018-12-24 PROCEDURE — 43239 EGD BIOPSY SINGLE/MULTIPLE: CPT | Performed by: SURGERY

## 2018-12-24 PROCEDURE — 88305 TISSUE EXAM BY PATHOLOGIST: CPT | Performed by: SURGERY

## 2018-12-24 RX ORDER — TRAMADOL HYDROCHLORIDE 50 MG/1
50 TABLET ORAL EVERY 6 HOURS PRN
Qty: 30 TABLET | Refills: 0 | Status: CANCELLED | OUTPATIENT
Start: 2018-12-24

## 2018-12-24 RX ORDER — ONDANSETRON 2 MG/ML
4 INJECTION INTRAMUSCULAR; INTRAVENOUS
Status: DISCONTINUED | OUTPATIENT
Start: 2018-12-24 | End: 2018-12-24 | Stop reason: HOSPADM

## 2018-12-24 RX ORDER — SIMETHICONE 40MG/0.6ML
SUSPENSION, DROPS(FINAL DOSAGE FORM)(ML) ORAL PRN
Status: DISCONTINUED | OUTPATIENT
Start: 2018-12-24 | End: 2018-12-24 | Stop reason: HOSPADM

## 2018-12-24 RX ORDER — PROPOFOL 10 MG/ML
INJECTION, EMULSION INTRAVENOUS PRN
Status: DISCONTINUED | OUTPATIENT
Start: 2018-12-24 | End: 2018-12-24

## 2018-12-24 RX ORDER — GLYCOPYRROLATE 0.2 MG/ML
INJECTION, SOLUTION INTRAMUSCULAR; INTRAVENOUS PRN
Status: DISCONTINUED | OUTPATIENT
Start: 2018-12-24 | End: 2018-12-24

## 2018-12-24 RX ORDER — TRAMADOL HYDROCHLORIDE 50 MG/1
50 TABLET ORAL EVERY 6 HOURS PRN
Qty: 30 TABLET | Refills: 0 | Status: SHIPPED | OUTPATIENT
Start: 2018-12-24 | End: 2019-04-07

## 2018-12-24 RX ORDER — LIDOCAINE 40 MG/G
CREAM TOPICAL
Status: DISCONTINUED | OUTPATIENT
Start: 2018-12-24 | End: 2018-12-24 | Stop reason: HOSPADM

## 2018-12-24 RX ORDER — SODIUM CHLORIDE, SODIUM LACTATE, POTASSIUM CHLORIDE, CALCIUM CHLORIDE 600; 310; 30; 20 MG/100ML; MG/100ML; MG/100ML; MG/100ML
INJECTION, SOLUTION INTRAVENOUS CONTINUOUS
Status: DISCONTINUED | OUTPATIENT
Start: 2018-12-24 | End: 2018-12-24 | Stop reason: HOSPADM

## 2018-12-24 RX ORDER — LIDOCAINE HYDROCHLORIDE 10 MG/ML
INJECTION, SOLUTION INFILTRATION; PERINEURAL PRN
Status: DISCONTINUED | OUTPATIENT
Start: 2018-12-24 | End: 2018-12-24

## 2018-12-24 RX ADMIN — LIDOCAINE HYDROCHLORIDE 1 ML: 10 INJECTION, SOLUTION EPIDURAL; INFILTRATION; INTRACAUDAL; PERINEURAL at 09:26

## 2018-12-24 RX ADMIN — PROPOFOL 100 MG: 10 INJECTION, EMULSION INTRAVENOUS at 09:37

## 2018-12-24 RX ADMIN — PROPOFOL 150 MG: 10 INJECTION, EMULSION INTRAVENOUS at 09:34

## 2018-12-24 RX ADMIN — LIDOCAINE HYDROCHLORIDE 50 MG: 10 INJECTION, SOLUTION INFILTRATION; PERINEURAL at 09:34

## 2018-12-24 RX ADMIN — SODIUM CHLORIDE, POTASSIUM CHLORIDE, SODIUM LACTATE AND CALCIUM CHLORIDE 1000 ML: 600; 310; 30; 20 INJECTION, SOLUTION INTRAVENOUS at 09:25

## 2018-12-24 RX ADMIN — GLYCOPYRROLATE 0.2 MG: 0.2 INJECTION, SOLUTION INTRAMUSCULAR; INTRAVENOUS at 09:34

## 2018-12-24 ASSESSMENT — MIFFLIN-ST. JEOR: SCORE: 1647.14

## 2018-12-24 NOTE — TELEPHONE ENCOUNTER
Script walked over to the [facility].    Sherley Godfrey, [facility]

## 2018-12-24 NOTE — ANESTHESIA POSTPROCEDURE EVALUATION
Patient: Ayana Prasad    Procedure(s):  COMBINED ESOPHAGOSCOPY, GASTROSCOPY, DUODENOSCOPY (EGD), BIOPSY SINGLE OR MULTIPLE    Diagnosis:LUQ abdominal pain; bloody diarrhea,     diagnostic  Diagnosis Additional Information: No value filed.    Anesthesia Type:  MAC    Note:  Anesthesia Post Evaluation    Patient location during evaluation: Phase 2  Patient participation: Able to fully participate in evaluation  Level of consciousness: awake and alert  Pain management: adequate  Airway patency: patent  Cardiovascular status: acceptable and hemodynamically stable  Respiratory status: acceptable and room air  Hydration status: acceptable  PONV: none     Anesthetic complications: None          Last vitals:  Vitals:    12/24/18 0853 12/24/18 0943 12/24/18 0945   BP: 124/83 102/70 102/81   Resp: 18 16    Temp: 37.2  C (99  F)     SpO2: 99% 96% 95%         Electronically Signed By: BROOKLYN Melgar CRNA  December 24, 2018  9:55 AM

## 2018-12-24 NOTE — TELEPHONE ENCOUNTER
This PA request was submitted to the insurance by the Central Prior Authorization Team.  If you have any questions in regards to this request, we can be reached at [phone].     Thanks    PA Initiation    Medication: ondansetron (ZOFRAN-ODT) 4 MG ODT tab  Insurance Company: [employer] [place] - Phone [phone] Fax [phone]  Pharmacy Filling the Rx: [facility] - [place], [place] - [address]  Filling Pharmacy Phone: [phone]  Filling Pharmacy Fax:    Start Date: 12/24/2018
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
## 2018-12-24 NOTE — TELEPHONE ENCOUNTER
Pt is calling and states that she is asking for a refill on her tramadol due to her back pain and spinal cord injury.  And hoping to get it filled today.     Please walk over to the [facility] pharmacy.         Aubrie Lala, [place]

## 2018-12-26 ENCOUNTER — OFFICE VISIT (OUTPATIENT)
Dept: URGENT CARE | Facility: URGENT CARE | Age: 28
End: 2018-12-26
Payer: COMMERCIAL

## 2018-12-26 VITALS — TEMPERATURE: 99 F | DIASTOLIC BLOOD PRESSURE: 86 MMHG | SYSTOLIC BLOOD PRESSURE: 122 MMHG | OXYGEN SATURATION: 98 %

## 2018-12-26 DIAGNOSIS — R11.0 NAUSEA: Primary | ICD-10-CM

## 2018-12-26 DIAGNOSIS — S61.209A AVULSION, FINGER TIP, INITIAL ENCOUNTER: Primary | ICD-10-CM

## 2018-12-26 LAB — COPATH REPORT: NORMAL

## 2018-12-26 PROCEDURE — 12001 RPR S/N/AX/GEN/TRNK 2.5CM/<: CPT | Performed by: NURSE PRACTITIONER

## 2018-12-26 RX ORDER — ONDANSETRON 4 MG/1
4 TABLET, FILM COATED ORAL EVERY 6 HOURS PRN
Qty: 21 TABLET | Refills: 3 | Status: SHIPPED | OUTPATIENT
Start: 2018-12-26 | End: 2019-05-31

## 2018-12-26 RX ORDER — VANCOMYCIN HYDROCHLORIDE 125 MG/1
125 CAPSULE ORAL 4 TIMES DAILY
COMMUNITY
End: 2019-03-10

## 2018-12-26 ASSESSMENT — ENCOUNTER SYMPTOMS
WOUND: 1
CONSTITUTIONAL NEGATIVE: 1

## 2018-12-26 NOTE — TELEPHONE ENCOUNTER
PRIOR AUTHORIZATION DENIED    Medication: ondansetron (ZOFRAN-ODT) 4 MG ODT tab - DENIED     Denial Date: 12/24/2018    Denial Rational:  You are only allowed to get 21 tablets per month unless you have one of the following:                Appeal Information: Please provide a letter of medical necessity

## 2018-12-26 NOTE — TELEPHONE ENCOUNTER
Can you please call Ayana and see if she is still having nausea?  If she is not there is no need to follow through with the PA for the Zofran.  If she is may need to order alternative medication.    Becki JENKINS

## 2018-12-26 NOTE — TELEPHONE ENCOUNTER
Followed up with the pharmacy, has now been informed    Routing to the provider.    Ham Patiño RT (r)  [facility]

## 2018-12-27 NOTE — PATIENT INSTRUCTIONS
Patient Education     Skin Tear (Skin Avulsion)  A skin avulsion is a tearing of the top layer of skin. This commonly happens after a fall or other injury. It also tends to be more common in older people, or those taking blood thinners or steroids for long periods of time.  Home care  These guidelines will help you care for your wound at home:    Keep the wound clean and dry for the first 24 to 48 hours, or as your healthcare provider advises.    If there is a dressing or bandage, change it when it gets wet or dirty. Otherwise, leave it on for the first 24 hours, then change it once a day or as often as the doctor says.    If stitches or staples were used, check the wound every day.    After taking off the dressing, wash the area gently with soap and water. Clean as close to the stitches as you can. Avoid washing or rubbing the stitches directly.    After 3 days you can keep the bandages off the wound, unless told otherwise, or there is continued drainage.  Allow the wound to be open to the air.    Keep a thin layer of antibiotic ointment on the cut. This will keep the wound clean, make it easier to remove the stitches, and reduce scarring.    If your wound is oozing, you can put a nonstick dressing over it. Then, reapply the bandage or dressing as you were told.    You can shower as usual after the first 24 hours, but don't soak the area in water (no baths or swimming) until the stitches or staples are taken out.    If surgical tape was used, keep the area clean and dry. If it becomes wet, blot it dry with a clean towel.    If skin glue was used, don't put any creams, lotions, or antibiotic ointments on it. These can dissolve the glue. Usually the glue will flake off in about 5 to 10 days by itself. Try to resist picking it off before that so the wound doesn't open up. When it gets wet, pat it dry.  Here is some information about medicine:    You may use over-the-counter medicine such as acetaminophen or  ibuprofen to control pain, unless another pain medicine was given. If you have chronic liver or kidney disease or ever had a stomach ulcer or gastrointestinal bleeding, talk with your doctor before using these medicines.    If you were given antibiotics, take them until they are all used up. It is important to finish the antibiotics even if the wound looks better. This will ensure that the infection has cleared.  Follow-up care  Follow up with your healthcare provider, or as advised.    Watch for any signs of infection, such as increasing redness, swelling, or pus coming out. If this happens, don't wait for your scheduled visit. Instead, see a doctor sooner.    Stitches or staples are usually taken out within 5 to 14 days. This varies depending on what part of your body they are on, and the type of wound. The doctor will tell you how long stitches should be left in.     If surgical tape was used, it is usually left on for 7 to 10 days. You can remove surgical tape after that unless you were told otherwise. If you try to remove it, and it is too hard, soaking can help. Surgical tape strips will eventually fall off on their own. If the edges of the cut pull apart, stop removing the tape or strips and follow up with your doctor    As mentioned above, skin glue will flake off by itself in 5 to 10 days, so you don't need to pull it off.  If any X-rays were done, you will be notified of any changes that may affect your care.  When to seek medical advice  Call your healthcare provider right away if any of these occur:    Increasing pain in the wound    Redness, swelling, or pus coming from the wound    Fever of 100.4 F (38 C) or higher, or as directed by your healthcare provider    Sutures or staples come apart or fall out before your next appointment and the wound edges look as if they will re-open    Surgical tape closures fall off before 7 days, and the wound edges look as if they will re-open    Bleeding not controlled  by direct pressure  Date Last Reviewed: 9/1/2016 2000-2018 [employer]. [address] [place] [address], [place] [address]. All rights reserved. This information is not intended as a substitute for professional medical care. Always follow your healthcare professional's instructions.
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
## 2019-01-02 ENCOUNTER — OFFICE VISIT (OUTPATIENT)
Dept: FAMILY MEDICINE | Facility: CLINIC | Age: 29
End: 2019-01-02
Payer: COMMERCIAL

## 2019-01-02 VITALS
BODY MASS INDEX: 30.86 KG/M2 | RESPIRATION RATE: 20 BRPM | DIASTOLIC BLOOD PRESSURE: 82 MMHG | WEIGHT: 192 LBS | TEMPERATURE: 98.6 F | HEART RATE: 96 BPM | SYSTOLIC BLOOD PRESSURE: 132 MMHG | HEIGHT: 66 IN

## 2019-01-02 DIAGNOSIS — M54.41 ACUTE BILATERAL LOW BACK PAIN WITH BILATERAL SCIATICA: Primary | ICD-10-CM

## 2019-01-02 DIAGNOSIS — M54.42 ACUTE BILATERAL LOW BACK PAIN WITH BILATERAL SCIATICA: Primary | ICD-10-CM

## 2019-01-02 PROCEDURE — 99213 OFFICE O/P EST LOW 20 MIN: CPT | Performed by: NURSE PRACTITIONER

## 2019-01-02 ASSESSMENT — MIFFLIN-ST. JEOR: SCORE: 1617.66

## 2019-01-02 ASSESSMENT — ENCOUNTER SYMPTOMS
COUGH: 0
ARTHRALGIAS: 0
FATIGUE: 0
CHEST TIGHTNESS: 0
WHEEZING: 0
SLEEP DISTURBANCE: 0
CONSTIPATION: 0
DIZZINESS: 0
LIGHT-HEADEDNESS: 0
SORE THROAT: 0
DYSPHORIC MOOD: 0
SHORTNESS OF BREATH: 0
VOMITING: 0
BACK PAIN: 1
HEADACHES: 0
NUMBNESS: 0
RHINORRHEA: 0
MYALGIAS: 0
ABDOMINAL DISTENTION: 0
DIARRHEA: 0
NERVOUS/ANXIOUS: 0
NAUSEA: 0
PALPITATIONS: 0
ABDOMINAL PAIN: 0

## 2019-01-02 ASSESSMENT — ANXIETY QUESTIONNAIRES
5. BEING SO RESTLESS THAT IT IS HARD TO SIT STILL: NOT AT ALL
1. FEELING NERVOUS, ANXIOUS, OR ON EDGE: NOT AT ALL
GAD7 TOTAL SCORE: 0
2. NOT BEING ABLE TO STOP OR CONTROL WORRYING: NOT AT ALL
7. FEELING AFRAID AS IF SOMETHING AWFUL MIGHT HAPPEN: NOT AT ALL
3. WORRYING TOO MUCH ABOUT DIFFERENT THINGS: NOT AT ALL
6. BECOMING EASILY ANNOYED OR IRRITABLE: NOT AT ALL

## 2019-01-02 ASSESSMENT — PATIENT HEALTH QUESTIONNAIRE - PHQ9
5. POOR APPETITE OR OVEREATING: NOT AT ALL
SUM OF ALL RESPONSES TO PHQ QUESTIONS 1-9: 0

## 2019-01-02 ASSESSMENT — PAIN SCALES - GENERAL: PAINLEVEL: SEVERE PAIN (6)
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
## 2019-01-02 NOTE — PATIENT INSTRUCTIONS
These are general instructions and may not be specific to you. Please call, email or follow up if you have any questions or concerns.     Call [facility] in [place] at [phone] or [facility] at [phone] to set up appointment date and time.

## 2019-01-03 ASSESSMENT — ANXIETY QUESTIONNAIRES: GAD7 TOTAL SCORE: 0

## 2019-01-04 ENCOUNTER — HOSPITAL ENCOUNTER (OUTPATIENT)
Dept: MRI IMAGING | Facility: CLINIC | Age: 29
Discharge: HOME | End: 2019-01-04
Attending: NURSE PRACTITIONER | Admitting: NURSE PRACTITIONER
Payer: COMMERCIAL

## 2019-01-04 DIAGNOSIS — M54.41 ACUTE BILATERAL LOW BACK PAIN WITH BILATERAL SCIATICA: ICD-10-CM

## 2019-01-04 DIAGNOSIS — M54.42 ACUTE BILATERAL LOW BACK PAIN WITH BILATERAL SCIATICA: ICD-10-CM

## 2019-01-04 PROCEDURE — A9585 GADOBUTROL INJECTION: HCPCS | Performed by: NURSE PRACTITIONER

## 2019-01-04 PROCEDURE — 72158 MRI LUMBAR SPINE W/O & W/DYE: CPT

## 2019-01-04 PROCEDURE — 25500064 ZZH RX 255 OP 636: Performed by: NURSE PRACTITIONER

## 2019-01-04 RX ORDER — GADOBUTROL 604.72 MG/ML
10 INJECTION INTRAVENOUS ONCE
Status: COMPLETED | OUTPATIENT
Start: 2019-01-04 | End: 2019-01-04

## 2019-01-04 RX ADMIN — GADOBUTROL 10 ML: 604.72 INJECTION INTRAVENOUS at 11:47

## 2019-01-04 NOTE — TELEPHONE ENCOUNTER
I called ayana. She is still nauseated and uses between 8-10 tablets per week. She said when she talked to the pharmacist she was told that it hasn't been denied , only that she was asking for the Zofran too soon.    I called the pharmacy to see if she could get a refill now and they said they need her insurance updated as what is on file is inactive.    Will have the  update insurance information then try to order medication. Ayana says she prefers the disintegrating tablets. Please send the pharmacy another script so we can see if it goes through. Thanks,  Obdulia Carvalho RN

## 2019-01-07 ENCOUNTER — MEDICAL CORRESPONDENCE (OUTPATIENT)
Dept: HEALTH INFORMATION MANAGEMENT | Facility: CLINIC | Age: 29
End: 2019-01-07

## 2019-01-18 ENCOUNTER — ANCILLARY PROCEDURE (OUTPATIENT)
Dept: GENERAL RADIOLOGY | Facility: CLINIC | Age: 29
End: 2019-01-18
Payer: COMMERCIAL

## 2019-01-18 ENCOUNTER — OFFICE VISIT (OUTPATIENT)
Dept: URGENT CARE | Facility: URGENT CARE | Age: 29
End: 2019-01-18
Payer: COMMERCIAL

## 2019-01-18 VITALS
TEMPERATURE: 99.1 F | BODY MASS INDEX: 30.34 KG/M2 | DIASTOLIC BLOOD PRESSURE: 81 MMHG | WEIGHT: 188 LBS | SYSTOLIC BLOOD PRESSURE: 126 MMHG | OXYGEN SATURATION: 98 % | HEART RATE: 111 BPM

## 2019-01-18 DIAGNOSIS — R50.9 FEVER, UNSPECIFIED FEVER CAUSE: ICD-10-CM

## 2019-01-18 DIAGNOSIS — Z87.442 HISTORY OF KIDNEY STONES: ICD-10-CM

## 2019-01-18 DIAGNOSIS — J06.9 UPPER RESPIRATORY TRACT INFECTION, UNSPECIFIED TYPE: Primary | ICD-10-CM

## 2019-01-18 DIAGNOSIS — Z86.19 HISTORY OF CLOSTRIDIUM DIFFICILE COLITIS: ICD-10-CM

## 2019-01-18 DIAGNOSIS — M94.0 COSTOCHONDRITIS: ICD-10-CM

## 2019-01-18 LAB
ALBUMIN UR-MCNC: NEGATIVE MG/DL
APPEARANCE UR: CLEAR
BACTERIA #/AREA URNS HPF: ABNORMAL /HPF
BASOPHILS # BLD AUTO: 0 10E9/L (ref 0–0.2)
BASOPHILS NFR BLD AUTO: 0.5 %
BILIRUB UR QL STRIP: NEGATIVE
COLOR UR AUTO: YELLOW
DEPRECATED S PYO AG THROAT QL EIA: NORMAL
DIFFERENTIAL METHOD BLD: NORMAL
EOSINOPHIL # BLD AUTO: 0.1 10E9/L (ref 0–0.7)
EOSINOPHIL NFR BLD AUTO: 0.8 %
ERYTHROCYTE [DISTWIDTH] IN BLOOD BY AUTOMATED COUNT: 13.9 % (ref 10–15)
FLUAV+FLUBV AG SPEC QL: NEGATIVE
FLUAV+FLUBV AG SPEC QL: NEGATIVE
GLUCOSE UR STRIP-MCNC: NEGATIVE MG/DL
HCT VFR BLD AUTO: 40.7 % (ref 35–47)
HGB BLD-MCNC: 13.6 G/DL (ref 11.7–15.7)
HGB UR QL STRIP: NEGATIVE
KETONES UR STRIP-MCNC: 15 MG/DL
LEUKOCYTE ESTERASE UR QL STRIP: ABNORMAL
LYMPHOCYTES # BLD AUTO: 1 10E9/L (ref 0.8–5.3)
LYMPHOCYTES NFR BLD AUTO: 16.6 %
MCH RBC QN AUTO: 28.9 PG (ref 26.5–33)
MCHC RBC AUTO-ENTMCNC: 33.4 G/DL (ref 31.5–36.5)
MCV RBC AUTO: 86 FL (ref 78–100)
MONOCYTES # BLD AUTO: 0.8 10E9/L (ref 0–1.3)
MONOCYTES NFR BLD AUTO: 12.5 %
MUCOUS THREADS #/AREA URNS LPF: PRESENT /LPF
NEUTROPHILS # BLD AUTO: 4.4 10E9/L (ref 1.6–8.3)
NEUTROPHILS NFR BLD AUTO: 69.6 %
NITRATE UR QL: NEGATIVE
NON-SQ EPI CELLS #/AREA URNS LPF: ABNORMAL /LPF
PH UR STRIP: 7 PH (ref 5–7)
PLATELET # BLD AUTO: 271 10E9/L (ref 150–450)
RBC # BLD AUTO: 4.71 10E12/L (ref 3.8–5.2)
RBC #/AREA URNS AUTO: ABNORMAL /HPF
SOURCE: ABNORMAL
SP GR UR STRIP: 1.02 (ref 1–1.03)
SPECIMEN SOURCE: NORMAL
SPECIMEN SOURCE: NORMAL
UROBILINOGEN UR STRIP-ACNC: 0.2 EU/DL (ref 0.2–1)
WBC # BLD AUTO: 6.3 10E9/L (ref 4–11)
WBC #/AREA URNS AUTO: ABNORMAL /HPF

## 2019-01-18 PROCEDURE — 99214 OFFICE O/P EST MOD 30 MIN: CPT | Performed by: PHYSICIAN ASSISTANT

## 2019-01-18 PROCEDURE — 71046 X-RAY EXAM CHEST 2 VIEWS: CPT

## 2019-01-18 PROCEDURE — 87804 INFLUENZA ASSAY W/OPTIC: CPT | Performed by: PHYSICIAN ASSISTANT

## 2019-01-18 PROCEDURE — 87081 CULTURE SCREEN ONLY: CPT | Performed by: PHYSICIAN ASSISTANT

## 2019-01-18 PROCEDURE — 87880 STREP A ASSAY W/OPTIC: CPT | Performed by: PHYSICIAN ASSISTANT

## 2019-01-18 PROCEDURE — 85025 COMPLETE CBC W/AUTO DIFF WBC: CPT | Performed by: PHYSICIAN ASSISTANT

## 2019-01-18 PROCEDURE — 36415 COLL VENOUS BLD VENIPUNCTURE: CPT | Performed by: PHYSICIAN ASSISTANT

## 2019-01-18 PROCEDURE — 81001 URINALYSIS AUTO W/SCOPE: CPT | Performed by: PHYSICIAN ASSISTANT
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
## 2019-01-18 NOTE — PATIENT INSTRUCTIONS
Patient Education     Chest Wall Pain: Costochondritis    The chest pain that you have had today is caused by costochondritis. This condition is caused by an inflammation of the cartilage joining your ribs to your breastbone. It is not caused by heart or lung problems. Your healthcare team has made sure that the chest pain you feel is not from a life threatening cause of chest pain such as heart attack, collapsed lung, blood clot in the lung, tear in the aorta, or esophageal rupture. The inflammation may have been brought on by a blow to the chest, lifting heavy objects, intense exercise, or an illness that made you cough and sneeze a lot. It often occurs during times of emotional stress. It can be painful, but it is not dangerous. It usually goes away in 1 to 2 weeks. But it may happen again. Rarely, a more serious condition may cause symptoms similar to costochondritis. That s why it s important to watch for the warning signs listed below.  Home care  Follow these guidelines when caring for yourself at home:    If you feel that emotional stress is a cause of your condition, try to figure out the sources of that stress. It may not be obvious. Learn ways to deal with the stress in your life. This can include regular exercise, muscle relaxation, meditation, or simply taking time out for yourself.    You may use acetaminophen, ibuprofen, or naproxen to control pain, unless another pain medicine was prescribed. If you have liver or kidney disease or ever had a stomach ulcer, talk with your healthcare provider before using these medicines.    You can also help ease pain by using a hot, wet compress or heating pad. Use this with or without a medicated skin cream that helps relieves pain.    Do stretching exercise as advised by your provider.    Take any prescribed medicines as directed.  Follow-up care  Follow up with your healthcare provider, or as advised, if you do not start to get better in the next 2 days.  When  to seek medical advice  Call your healthcare provider right away if any of these occur:    A change in the type of pain. Call if it feels different, becomes more serious, lasts longer, or spreads into your shoulder, arm, neck, jaw, or back.    Shortness of breath or pain gets worse when you breathe    Weakness, dizziness, or fainting    Cough with dark-colored sputum (phlegm) or blood    Abdominal pain    Dark red or black stools    Fever of 100.4 F (38 C) or higher, or as directed by your healthcare provider  Date Last Reviewed: 12/1/2016 2000-2018 [employer]. [address] [place] [id number]. All rights reserved. This information is not intended as a substitute for professional medical care. Always follow your healthcare professional's instructions.

## 2019-01-19 LAB
BACTERIA SPEC CULT: NORMAL
SPECIMEN SOURCE: NORMAL

## 2019-01-28 ENCOUNTER — OFFICE VISIT (OUTPATIENT)
Dept: FAMILY MEDICINE | Facility: CLINIC | Age: 29
End: 2019-01-28
Payer: COMMERCIAL

## 2019-01-28 VITALS
HEART RATE: 92 BPM | WEIGHT: 181 LBS | OXYGEN SATURATION: 98 % | DIASTOLIC BLOOD PRESSURE: 64 MMHG | TEMPERATURE: 99.3 F | BODY MASS INDEX: 29.21 KG/M2 | SYSTOLIC BLOOD PRESSURE: 114 MMHG

## 2019-01-28 DIAGNOSIS — A04.71 RECURRENT CLOSTRIDIUM DIFFICILE DIARRHEA: Primary | ICD-10-CM

## 2019-01-28 DIAGNOSIS — A04.72 C. DIFFICILE COLITIS: Primary | ICD-10-CM

## 2019-01-28 PROCEDURE — 99213 OFFICE O/P EST LOW 20 MIN: CPT | Performed by: PHYSICIAN ASSISTANT

## 2019-01-28 RX ORDER — VANCOMYCIN HYDROCHLORIDE 125 MG/1
CAPSULE ORAL
Qty: 75 CAPSULE | Refills: 0 | Status: SHIPPED | OUTPATIENT
Start: 2019-01-28 | End: 2019-03-10

## 2019-01-28 RX ORDER — ACETAMINOPHEN 325 MG/1
325-650 TABLET ORAL EVERY 6 HOURS PRN
COMMUNITY
End: 2019-03-10

## 2019-01-28 NOTE — NURSING NOTE
"Initial /64   Pulse 92   Temp 99.3  F (37.4  C) (Tympanic)   Wt 82.1 kg (181 lb)   LMP 01/20/2019 (Exact Date)   SpO2 98%   BMI 29.21 kg/m   Estimated body mass index is 29.21 kg/m  as calculated from the following:    Height as of 1/2/19: 1.676 m (5' 6\").    Weight as of this encounter: 82.1 kg (181 lb). .    Nancy Adrian, ASHLYN on 1/28/2019 at 4:13 PM    "

## 2019-01-28 NOTE — PROGRESS NOTES
SUBJECTIVE:   Ayana Prasad is a 28 year old female who presents to clinic today for the following health issues:      ED/UC Followup:    Facility:  [facility]  Date of visit: 1/18/2019  Reason for visit: fatigue, cough, chest pain, and pain with breathing  Current Status: No improvement since visit.        She c/o nausea and diarrhea.  Had C diff in Dec 2018 and completed 10 day course of vanco.  Taking tylenol daily yet still with a fever (around 99 to low 100s).   In the am 101.  Cough and runny nosed resolved a few days ago.   Nausea present for months.    She c/o left sided chest pain, worse when laying down.    No SOB.    No pain with urination.     Reglan and zofran not helping with nausea.    She c/o lower abdominal pain.      Current symptoms feel similar to previous C Diff infection.            Problem list and histories reviewed & adjusted, as indicated.  Additional history: as documented    Patient Active Problem List   Diagnosis     Urolithiasis     ADHD (attention deficit hyperactivity disorder)     CARDIOVASCULAR SCREENING; LDL GOAL LESS THAN 160     Bipolar 1 disorder, manic, mild     Marijuana abuse     Polysubstance abuse (H)     GERD (gastroesophageal reflux disease)     Tobacco abuse     Health Care Home     Abdominal pain, right upper quadrant     Intractable back pain     Renal colic     Insomnia     Optic neuritis     Cauda equina syndrome with neurogenic bladder (H)     MENTAL HEALTH     Schizoaffective disorder, bipolar type (H)     PTSD (post-traumatic stress disorder)     Anxiety     Auditory hallucinations     Class 2 obesity due to excess calories in adult     Nephrolithiasis     Flank pain     Vomiting, intractability of vomiting not specified, presence of nausea not specified, unspecified vomiting type     Cyst of left ovary     Borderline personality disorder (H)     Cannabis dependence (H)     Depression     Episodic mood disorder (H)     Heroin abuse (H)      Methamphetamine abuse (H)     Moderate episode of recurrent major depressive disorder (H)     Opioid use disorder, severe, dependence (H)     Substance-induced psychotic disorder with hallucinations (H)     Past Surgical History:   Procedure Laterality Date     BACK SURGERY  12/24/2016    For Cauda Equina Syndrome     COLONOSCOPY       COMBINED CYSTOSCOPY, INSERT STENT URETER(S) Left 8/30/2018    Procedure: COMBINED CYSTOSCOPY, INSERT STENT URETER(S);  Cystoscopy With Left Stent Placement;  Surgeon: Kiran Ulrich MD;  Location: [facility]     COMBINED CYSTOSCOPY, RETROGRADES, EXCHANGE STENT URETER(S) Left 10/14/2018    Procedure: COMBINED CYSTOSCOPY, RETROGRADES, EXCHANGE STENT URETER(S);  Cystoscopy and removal of left-sided stent.;  Surgeon: Stiven Olivo MD;  Location:  OR     COMBINED CYSTOSCOPY, RETROGRADES, URETEROSCOPY, INSERT STENT  1/6/2014    Procedure: COMBINED CYSTOSCOPY, RETROGRADES, URETEROSCOPY, INSERT STENT;  Cystyoscopy place left ureteral stent;  Surgeon: Jaun Kimble MD;  Location: [facility]     COMBINED CYSTOSCOPY, RETROGRADES, URETEROSCOPY, INSERT STENT Left 10/23/2018    Procedure: Video cystoscopy, left ureteroscopy with stone extraction;  Surgeon: Bull Mast MD;  Location:  OR     CYSTOSCOPY, URETEROSCOPY, COMBINED Right 9/23/2015    Procedure: COMBINED CYSTOSCOPY, URETEROSCOPY;  Surgeon: ROME Jett MD;  Location: [facility]     ENT SURGERY       ESOPHAGOSCOPY, GASTROSCOPY, DUODENOSCOPY (EGD), COMBINED  4/8/2013    Procedure: COMBINED ESOPHAGOSCOPY, GASTROSCOPY, DUODENOSCOPY (EGD), BIOPSY SINGLE OR MULTIPLE;  Gastroscopy;  Surgeon: Peter Pickard MD;  Location: [facility]     ESOPHAGOSCOPY, GASTROSCOPY, DUODENOSCOPY (EGD), COMBINED Left 10/28/2014    Procedure: COMBINED ESOPHAGOSCOPY, GASTROSCOPY, DUODENOSCOPY (EGD), BIOPSY SINGLE OR MULTIPLE;  Surgeon: Narcisa Ramirez MD;  Location:  OR     ESOPHAGOSCOPY, GASTROSCOPY, DUODENOSCOPY (EGD), COMBINED N/A  12/24/2018    Procedure: COMBINED ESOPHAGOSCOPY, GASTROSCOPY, DUODENOSCOPY (EGD), BIOPSY SINGLE OR MULTIPLE;  Surgeon: Sonu Verduzco MD;  Location: [facility]     GENITOURINARY SURGERY  3.11.2011    removal of kidney stones     LAPAROSCOPIC CHOLECYSTECTOMY  11/20/2014    [facility], Dr. Ramirez     LASER HOLMIUM LITHOTRIPSY URETER(S), INSERT STENT, COMBINED  4/2/2014    Procedure: COMBINED CYSTOSCOPY, URETEROSCOPY, LASER HOLMIUM LITHOTRIPSY URETER(S), INSERT STENT;  Cystoscopy,Left Ureteral Stent Removal,Left Ureteroscopy with Laser Lithotripsy,Left Ureter Stent Placement;  Surgeon: ROME Jett MD;  Location: [facility]     SURGICAL HISTORY OF -   3/11/2011    Transurethral stone resection       Social History     Tobacco Use     Smoking status: Former Smoker     Packs/day: 0.50     Years: 5.00     Pack years: 2.50     Types: Other     Start date: 8/1/2011     Smokeless tobacco: Current User     Tobacco comment: Smokes E-cig   Substance Use Topics     Alcohol use: No     Alcohol/week: 0.0 oz     Family History   Problem Relation Age of Onset     Gastrointestinal Disease Father         Crohn's Disease     Cancer Father         Liver Cancer     Other Cancer Father         Liver     Cancer Maternal Grandmother         lympoma     Diabetes Maternal Grandmother      Mental Illness Maternal Grandmother      Other Cancer Maternal Grandmother         Non Hodgkins Lymphoma     Diabetes Maternal Grandfather      Hypertension Maternal Grandfather      Prostate Cancer Maternal Grandfather      Hyperlipidemia Maternal Grandfather      Heart Disease Paternal Grandfather         heart disease     Hypertension Brother      Other Cancer Brother         Esophegeal cancer     Diabetes Brother      Hyperlipidemia Mother      Mental Illness Mother      Anxiety Disorder Mother      Anesthesia Reaction Mother      Hypertension Brother      Other Cancer Brother      Other Cancer Paternal Half-Brother         Esophageal         Current  Outpatient Medications   Medication Sig Dispense Refill     acetaminophen (TYLENOL) 325 MG tablet Take 325-650 mg by mouth every 6 hours as needed for mild pain       DimenhyDRINATE (DRAMAMINE PO)        metoclopramide (REGLAN) 10 MG tablet Take 1 tablet (10 mg) by mouth 4 times daily (before meals and nightly) 40 tablet 0     ondansetron (ZOFRAN) 4 MG tablet Take 1 tablet (4 mg) by mouth every 6 hours as needed for nausea 21 tablet 3     traMADol (ULTRAM) 50 MG tablet Take 1 tablet (50 mg) by mouth every 6 hours as needed for severe pain 30 tablet 0     vancomycin (VANCOCIN HCL) 125 MG capsule Take 125 mg by mouth 4 times daily       vancomycin (VANCOCIN) 125 MG capsule Take 1 cap four times per day for 10 days, then 1 tab twice per day for 7 days then 1 cap daily for 7 days, then 1 cap every 3 days for 4 weeks. 75 capsule 0     BP Readings from Last 3 Encounters:   01/28/19 114/64   01/18/19 126/81   01/02/19 132/82    Wt Readings from Last 3 Encounters:   01/28/19 82.1 kg (181 lb)   01/18/19 85.3 kg (188 lb)   01/02/19 87.1 kg (192 lb)                    Reviewed and updated as needed this visit by clinical staff  Tobacco  Allergies  Meds  Med Hx  Surg Hx  Fam Hx  Soc Hx      Reviewed and updated as needed this visit by Provider         ROS:  Constitutional, HEENT, cardiovascular, pulmonary, gi and gu systems are negative, except as otherwise noted.    OBJECTIVE:     /64   Pulse 92   Temp 99.3  F (37.4  C) (Tympanic)   Wt 82.1 kg (181 lb)   LMP 01/20/2019 (Exact Date)   SpO2 98%   BMI 29.21 kg/m    Body mass index is 29.21 kg/m .  GENERAL: healthy, alert and no distress  EYES: Eyes grossly normal to inspection, PERRL and conjunctivae and sclerae normal  HENT: ear canals and TM's normal, nose and mouth without ulcers or lesions  RESP: lungs clear to auscultation - no rales, rhonchi or wheezes  CV: regular rate and rhythm, normal S1 S2, no S3 or S4, no murmur, click or rub, no peripheral edema and  peripheral pulses strong  ABDOMEN: soft, generalized lower abdominal and periumbilical tenderness, no hepatosplenomegaly, no masses and bowel sounds normal    Diagnostic Test Results:  none     ASSESSMENT/PLAN:         1. C. difficile colitis  Ayana is restricted to receiving prescriptions through her PCP only.  Talked with Becki Avery NP who is familiar with patient and plan is to restart Vanco with a pulsed taper.        FUTURE APPOINTMENTS:       - Follow-up visit if no improvement over the next week or so with PCP (sooner if symptoms worsen)    Kristin Martin PA-C  [facility]

## 2019-01-29 ENCOUNTER — NURSE TRIAGE (OUTPATIENT)
Dept: NURSING | Facility: CLINIC | Age: 29
End: 2019-01-29

## 2019-01-30 DIAGNOSIS — R11.0 NAUSEA: Primary | ICD-10-CM

## 2019-01-30 RX ORDER — SCOLOPAMINE TRANSDERMAL SYSTEM 1 MG/1
1 PATCH, EXTENDED RELEASE TRANSDERMAL
Qty: 10 PATCH | Refills: 0 | Status: SHIPPED | OUTPATIENT
Start: 2019-01-30 | End: 2019-03-10

## 2019-01-30 NOTE — TELEPHONE ENCOUNTER
102.5 oral temp. Nauseated still.  Will call clinic during office hours.  Pat Avila RN  [facility]       Additional Information    Negative: Severe difficulty breathing (e.g., struggling for each breath, speaks in single words)    Negative: Sounds like a life-threatening emergency to the triager    Negative: MODERATE difficulty breathing (e.g., speaks in phrases, SOB even at rest, pulse 100-120)    Negative: Fever > 103 F (39.4 C)    Negative: Patient sounds very sick or weak to the triager    [1] Taking antibiotics < 48 hours AND [2] fever still present (SAME) (all triage questions negative)    Negative: [1] Finished taking antibiotics AND [2] symptoms are BETTER but [3] not completely gone    Negative: [1] Taking antibiotic AND [2] new onset of fever    Negative: [1] Taking antibiotic > 48 hours (2 days) AND [2] fever still present (SAME)    Negative: [1] Taking antibiotic > 72 hours (3 days) AND [2] symptoms (other than fever) not improved    Negative: Caller has NON-URGENT question and triager unable to answer question    Protocols used: INFECTION ON ANTIBIOTIC FOLLOW-UP CALL-ADULT-

## 2019-01-31 ENCOUNTER — APPOINTMENT (OUTPATIENT)
Dept: GENERAL RADIOLOGY | Facility: CLINIC | Age: 29
End: 2019-01-31
Payer: COMMERCIAL

## 2019-01-31 ENCOUNTER — TELEPHONE (OUTPATIENT)
Dept: FAMILY MEDICINE | Facility: CLINIC | Age: 29
End: 2019-01-31

## 2019-01-31 ENCOUNTER — HOSPITAL ENCOUNTER (EMERGENCY)
Facility: CLINIC | Age: 29
Discharge: HOME | End: 2019-01-31
Attending: INTERNAL MEDICINE | Admitting: INTERNAL MEDICINE
Payer: COMMERCIAL

## 2019-01-31 ENCOUNTER — TRANSFERRED RECORDS (OUTPATIENT)
Dept: HEALTH INFORMATION MANAGEMENT | Facility: CLINIC | Age: 29
End: 2019-01-31

## 2019-01-31 VITALS
DIASTOLIC BLOOD PRESSURE: 61 MMHG | OXYGEN SATURATION: 99 % | WEIGHT: 180.78 LBS | SYSTOLIC BLOOD PRESSURE: 90 MMHG | HEART RATE: 71 BPM | RESPIRATION RATE: 20 BRPM | TEMPERATURE: 97.8 F | BODY MASS INDEX: 29.18 KG/M2

## 2019-01-31 DIAGNOSIS — R07.9 CHEST PAIN, UNSPECIFIED TYPE: ICD-10-CM

## 2019-01-31 DIAGNOSIS — R11.2 NON-INTRACTABLE VOMITING WITH NAUSEA, UNSPECIFIED VOMITING TYPE: ICD-10-CM

## 2019-01-31 DIAGNOSIS — R10.84 ABDOMINAL PAIN, GENERALIZED: ICD-10-CM

## 2019-01-31 DIAGNOSIS — A04.72 CLOSTRIDIUM DIFFICILE COLITIS: Primary | ICD-10-CM

## 2019-01-31 LAB
ALBUMIN SERPL-MCNC: 3.7 G/DL (ref 3.4–5)
ALBUMIN UR-MCNC: NEGATIVE MG/DL
ALP SERPL-CCNC: 70 U/L (ref 40–150)
ALT SERPL W P-5'-P-CCNC: 50 U/L (ref 0–50)
ANION GAP SERPL CALCULATED.3IONS-SCNC: 7 MMOL/L (ref 3–14)
APPEARANCE UR: ABNORMAL
AST SERPL W P-5'-P-CCNC: 37 U/L (ref 0–45)
BACTERIA #/AREA URNS HPF: ABNORMAL /HPF
BASOPHILS # BLD AUTO: 0.1 10E9/L (ref 0–0.2)
BASOPHILS NFR BLD AUTO: 0.6 %
BILIRUB SERPL-MCNC: 0.3 MG/DL (ref 0.2–1.3)
BILIRUB UR QL STRIP: NEGATIVE
BUN SERPL-MCNC: 12 MG/DL (ref 7–30)
CALCIUM SERPL-MCNC: 9 MG/DL (ref 8.5–10.1)
CHLORIDE SERPL-SCNC: 108 MMOL/L (ref 94–109)
CO2 SERPL-SCNC: 24 MMOL/L (ref 20–32)
COLOR UR AUTO: YELLOW
CREAT SERPL-MCNC: 0.78 MG/DL (ref 0.52–1.04)
D DIMER PPP FEU-MCNC: 0.4 UG/ML FEU (ref 0–0.5)
DIFFERENTIAL METHOD BLD: NORMAL
EOSINOPHIL # BLD AUTO: 0.1 10E9/L (ref 0–0.7)
EOSINOPHIL NFR BLD AUTO: 1.2 %
ERYTHROCYTE [DISTWIDTH] IN BLOOD BY AUTOMATED COUNT: 12.9 % (ref 10–15)
GFR SERPL CREATININE-BSD FRML MDRD: >90 ML/MIN/{1.73_M2}
GLUCOSE SERPL-MCNC: 80 MG/DL (ref 70–99)
GLUCOSE UR STRIP-MCNC: NEGATIVE MG/DL
HCT VFR BLD AUTO: 43.9 % (ref 35–47)
HGB BLD-MCNC: 14.8 G/DL (ref 11.7–15.7)
HGB UR QL STRIP: NEGATIVE
IMM GRANULOCYTES # BLD: 0 10E9/L (ref 0–0.4)
IMM GRANULOCYTES NFR BLD: 0.3 %
KETONES UR STRIP-MCNC: NEGATIVE MG/DL
LEUKOCYTE ESTERASE UR QL STRIP: ABNORMAL
LIPASE SERPL-CCNC: 163 U/L (ref 73–393)
LYMPHOCYTES # BLD AUTO: 3.4 10E9/L (ref 0.8–5.3)
LYMPHOCYTES NFR BLD AUTO: 35.2 %
MCH RBC QN AUTO: 28.6 PG (ref 26.5–33)
MCHC RBC AUTO-ENTMCNC: 33.7 G/DL (ref 31.5–36.5)
MCV RBC AUTO: 85 FL (ref 78–100)
MONOCYTES # BLD AUTO: 0.7 10E9/L (ref 0–1.3)
MONOCYTES NFR BLD AUTO: 7 %
MUCOUS THREADS #/AREA URNS LPF: PRESENT /LPF
NEUTROPHILS # BLD AUTO: 5.3 10E9/L (ref 1.6–8.3)
NEUTROPHILS NFR BLD AUTO: 55.7 %
NITRATE UR QL: NEGATIVE
NRBC # BLD AUTO: 0 10*3/UL
NRBC BLD AUTO-RTO: 0 /100
PH UR STRIP: 6 PH (ref 5–7)
PLATELET # BLD AUTO: 378 10E9/L (ref 150–450)
POTASSIUM SERPL-SCNC: 3.8 MMOL/L (ref 3.4–5.3)
PROT SERPL-MCNC: 8.1 G/DL (ref 6.8–8.8)
RBC # BLD AUTO: 5.17 10E12/L (ref 3.8–5.2)
RBC #/AREA URNS AUTO: 1 /HPF (ref 0–2)
SODIUM SERPL-SCNC: 139 MMOL/L (ref 133–144)
SOURCE: ABNORMAL
SP GR UR STRIP: 1.02 (ref 1–1.03)
SQUAMOUS #/AREA URNS AUTO: 2 /HPF (ref 0–1)
TROPONIN I SERPL-MCNC: <0.015 UG/L (ref 0–0.04)
UROBILINOGEN UR STRIP-MCNC: 0 MG/DL (ref 0–2)
WBC # BLD AUTO: 9.6 10E9/L (ref 4–11)
WBC #/AREA URNS AUTO: 3 /HPF (ref 0–5)

## 2019-01-31 PROCEDURE — 85025 COMPLETE CBC W/AUTO DIFF WBC: CPT | Performed by: INTERNAL MEDICINE

## 2019-01-31 PROCEDURE — 80053 COMPREHEN METABOLIC PANEL: CPT | Performed by: INTERNAL MEDICINE

## 2019-01-31 PROCEDURE — 84484 ASSAY OF TROPONIN QUANT: CPT | Performed by: INTERNAL MEDICINE

## 2019-01-31 PROCEDURE — 81001 URINALYSIS AUTO W/SCOPE: CPT | Performed by: INTERNAL MEDICINE

## 2019-01-31 PROCEDURE — 96361 HYDRATE IV INFUSION ADD-ON: CPT

## 2019-01-31 PROCEDURE — 85379 FIBRIN DEGRADATION QUANT: CPT | Performed by: INTERNAL MEDICINE

## 2019-01-31 PROCEDURE — 25000132 ZZH RX MED GY IP 250 OP 250 PS 637: Performed by: INTERNAL MEDICINE

## 2019-01-31 PROCEDURE — 83690 ASSAY OF LIPASE: CPT | Performed by: INTERNAL MEDICINE

## 2019-01-31 PROCEDURE — 99285 EMERGENCY DEPT VISIT HI MDM: CPT | Mod: 25

## 2019-01-31 PROCEDURE — 96375 TX/PRO/DX INJ NEW DRUG ADDON: CPT

## 2019-01-31 PROCEDURE — 93005 ELECTROCARDIOGRAM TRACING: CPT

## 2019-01-31 PROCEDURE — 25000128 H RX IP 250 OP 636: Performed by: INTERNAL MEDICINE

## 2019-01-31 PROCEDURE — 71046 X-RAY EXAM CHEST 2 VIEWS: CPT

## 2019-01-31 PROCEDURE — 25000125 ZZHC RX 250: Performed by: INTERNAL MEDICINE

## 2019-01-31 PROCEDURE — 96374 THER/PROPH/DIAG INJ IV PUSH: CPT

## 2019-01-31 RX ORDER — SCOLOPAMINE TRANSDERMAL SYSTEM 1 MG/1
1 PATCH, EXTENDED RELEASE TRANSDERMAL
Status: DISCONTINUED | OUTPATIENT
Start: 2019-01-31 | End: 2019-01-31 | Stop reason: HOSPADM

## 2019-01-31 RX ORDER — DIPHENHYDRAMINE HYDROCHLORIDE 50 MG/ML
25 INJECTION INTRAMUSCULAR; INTRAVENOUS ONCE
Status: COMPLETED | OUTPATIENT
Start: 2019-01-31 | End: 2019-01-31

## 2019-01-31 RX ORDER — SODIUM CHLORIDE, SODIUM LACTATE, POTASSIUM CHLORIDE, CALCIUM CHLORIDE 600; 310; 30; 20 MG/100ML; MG/100ML; MG/100ML; MG/100ML
1000 INJECTION, SOLUTION INTRAVENOUS CONTINUOUS
Status: DISCONTINUED | OUTPATIENT
Start: 2019-01-31 | End: 2019-01-31 | Stop reason: HOSPADM

## 2019-01-31 RX ADMIN — SCOPOLAMINE 1 PATCH: 1 PATCH, EXTENDED RELEASE TRANSDERMAL at 20:37

## 2019-01-31 RX ADMIN — PROCHLORPERAZINE EDISYLATE 5 MG: 5 INJECTION INTRAMUSCULAR; INTRAVENOUS at 19:24

## 2019-01-31 RX ADMIN — DIPHENHYDRAMINE HYDROCHLORIDE 25 MG: 50 INJECTION, SOLUTION INTRAMUSCULAR; INTRAVENOUS at 19:23

## 2019-01-31 RX ADMIN — SODIUM CHLORIDE, POTASSIUM CHLORIDE, SODIUM LACTATE AND CALCIUM CHLORIDE 1000 ML: 600; 310; 30; 20 INJECTION, SOLUTION INTRAVENOUS at 19:24

## 2019-01-31 RX ADMIN — FIDAXOMICIN 200 MG: 200 TABLET, FILM COATED ORAL at 20:35

## 2019-01-31 ASSESSMENT — ENCOUNTER SYMPTOMS
VOMITING: 1
NAUSEA: 1
ABDOMINAL PAIN: 1
DIARRHEA: 1
DIZZINESS: 1

## 2019-01-31 NOTE — ED AVS SNAPSHOT
[facility]  [address]  [facility] [address]  Phone:  [phone]  Fax:  [phone]                                    Ayana Prasad   MRN: [id number]    Department:  [facility]   Date of Visit:  1/31/2019           After Visit Summary Signature Page    I have received my discharge instructions, and my questions have been answered. I have discussed any challenges I see with this plan with the nurse or doctor.    ..........................................................................................................................................  Patient/Patient Representative Signature      ..........................................................................................................................................  Patient Representative Print Name and Relationship to Patient    ..................................................               ................................................  Date                                   Time    ..........................................................................................................................................  Reviewed by Signature/Title    ...................................................              ..............................................  Date                                               Time          22EPIC Rev 08/18

## 2019-01-31 NOTE — TELEPHONE ENCOUNTER
Received a Third Party Rejection from the [facility] for TransdermScopolamine Patch    Routed to the [facility] epa team        Ham Patiño RT (r)  [facility]

## 2019-01-31 NOTE — TELEPHONE ENCOUNTER
Received a Third Party Rejection from the [facility] for Dificid 200mg    Routed to the [facility] epa team        Ham Patiño RT (r)  [facility]

## 2019-02-01 LAB — INTERPRETATION ECG - MUSE: NORMAL

## 2019-02-01 NOTE — ED TRIAGE NOTES
Pt arrives with L sided rib pain, N/V/D x1 week. In today because she cannot keep down medications or liquids. Taking Reglan & zofran at home without relief. Pt states her GI doctor referred her here. ABCs intact.

## 2019-02-01 NOTE — DISCHARGE INSTRUCTIONS
Discharge Instructions  Abdominal Pain    Abdominal pain can be caused by many things. Your evaluation today does not show the exact cause for your pain. Your doctor today has decided that it is unlikely your pain is due to a life threatening problem, or a problem requiring surgery or hospital admission. Sometimes those problems cannot be found right away, so it is very important that you follow up as directed.  Sometimes only the changes which occur over time allow the cause of your pain to be found.      ADULTS:  Return to the Emergency Department right away if:    You get an oral temperature above 102oF or as directed by your doctor.  You have blood in your stools (bright red or black, tarry stools).  You keep throwing up or can?t drink liquids.  You see blood when you throw up.  You can?t have a bowel movement or you can?t pass gas.  Your stomach gets bloated or bigger.  Your skin or the whites of your eyes look yellow.  You faint.  You have bloody, frequent or painful urination.  You have new symptoms or anything that worries you.    CHILDREN:  Return to the Emergency Department right away if your child has any of the above-listed symptoms or the following:    Pushes your hand away or screams/cries when his/her belly is touched.  You notice your child is very fussy or weak.  Your child is very tired and is too tired to eat or drink.  Your child is dehydrated.  Signs of dehydration can be:  Your infant has had no wet diapers in 4-5 hours.  Your older child has not passed urine in 6-8 hours.  Your infant or child starts to have dry mouth and lips, or no saliva or tears.    PREGNANT WOMEN:  Return to the Emergency Department right away if you have any of the above-listed symptoms or the following:    You have bleeding, leaking fluid or passing tissue from the vagina.  You have worse pain or cramping, or pain in your shoulder or back.  You have vomiting that will not stop.  You have painful or bloody  urination.  You have a temperature of 100oF or more.  Your baby is not moving as much as usual.  You faint.  You get a bad headache with or without eye problems and abdominal pain.  You have a convulsion or seizure.  You have unusual discharge from your vagina and abdominal pain.    Abdominal pain is pretty common during pregnancy.  Your pain may or may not be related to your pregnancy. You should follow-up closely with your OB doctor so they can evaluate you and your baby.  Until you follow-up with your regular doctor, do the following:     Avoid sex and do not put anything in your vagina.  Drink clear fluids.  Only take medications approved by your doctor.    MORE INFORMATION:    Appendicitis:  A possible cause of abdominal pain in any person who still has their appendix is acute appendicitis. Appendicitis is often hard to diagnose.  Testing does not always rule out early appendicitis or other causes of abdominal pain. Close follow-up with your doctor and re-evaluations may be needed to figure out the reason for your abdominal pain.    Follow-up:  It is very important that you make an appointment with your clinic and go to the appointment.  If you do not follow-up with your primary doctor, it may result in missing an important development which could result in permanent injury or disability and/or lasting pain.  If there is any problem keeping your appointment, call your doctor or return to the Emergency Department.    Medications:  Take your medications as directed by your doctor today.  Before using over-the-counter medications, ask your doctor and make sure to take the medications as directed.  If you have any questions about medications, ask your doctor.    Diet:  Resume your normal diet as much as possible, but do not eat fried, fatty or spicy foods while you have pain.  Do not drink alcohol or have caffeine.  Do not smoke tobacco.    Probiotics: If you have been given an antibiotic, you may want to also take  "a probiotic pill or eat yogurt with live cultures. Probiotics have \"good bacteria\" to help your intestines stay healthy. Studies have shown that probiotics help prevent diarrhea and other intestine problems (including C. diff infection) when you take antibiotics. You can buy these without a prescription in the pharmacy section of the store.     If you were given a prescription for medicine here today, be sure to read all of the information (including the package insert) that comes with your prescription.  This will include important information about the medicine, its side effects, and any warnings that you need to know about.  The pharmacist who fills the prescription can provide more information and answer questions you may have about the medicine.  If you have questions or concerns that the pharmacist cannot address, please call or return to the Emergency Department.         Opioid Medication Information    Pain medications are among the most commonly prescribed medicines, so we are including this information for all our patients. If you did not receive pain medication or get a prescription for pain medicine, you can ignore it.     You may have been given a prescription for an opioid (narcotic) pain medicine and/or have received a pain medicine while here in the Emergency Department. These medicines can make you drowsy or impaired. You must not drive, operate dangerous equipment, or engage in any other dangerous activities while taking these medications. If you drive while taking these medications, you could be arrested for DUI, or driving under the influence. Do not drink any alcohol while you are taking these medications.     Opioid pain medications can cause addiction. If you have a history of chemical dependency of any type, you are at a higher risk of becoming addicted to pain medications.  Only take these prescribed medications to treat your pain when all other options have been tried. Take it for as short " a time and as few doses as possible. Store your pain pills in a secure place, as they are frequently stolen and provide a dangerous opportunity for children or visitors in your house to start abusing these powerful medications. We will not replace any lost or stolen medicine.  As soon as your pain is better, you should flush all your remaining medication.     Many prescription pain medications contain Tylenol  (acetaminophen), including Vicodin , Tylenol #3 , Norco , Lortab , and Percocet .  You should not take any extra pills of Tylenol  if you are using these prescription medications or you can get very sick.  Do not ever take more than 3000 mg of acetaminophen in any 24 hour period.    All opioids tend to cause constipation. Drink plenty of water and eat foods that have a lot of fiber, such as fruits, vegetables, prune juice, apple juice and high fiber cereal.  Take a laxative if you don?t move your bowels at least every other day. Miralax , Milk of Magnesia, Colace , or Senna  can be used to keep you regular.      Remember that you can always come back to the Emergency Department if you are not able to see your regular doctor in the amount of time listed above, if you get any new symptoms, or if there is anything that worries you.

## 2019-02-01 NOTE — ED PROVIDER NOTES
History     Chief Complaint:  Nausea, Vomiting, & Diarrhea and Chest Pain    HPI   Ayana Prasad is a 28 year old female with a history of bipolar 1 disorder who presents to the emergency department for evaluation of nausea, vomiting, diarrhea, and chest pain. The patient reports she has experienced around 1 week of abdominal pain, nausea, vomiting, diarrhea, and dizziness. She was seen by her PCP recently and was referred to GI this morning, where GI noted concern for C diff. Patient does have a recent history of C diff, with a positive culture in December and treatment accordingly given. She was not recently retested despite lack of improvement of symptoms because GI did not expect the test to return negative this soon after the antibiotics course. GI prescribed Dificid to the patient today with her current symptoms and stated the patient should present to the ED if she was unable to keep her medication down; since she was unable to do so, the patient presents currently.     The patient has a secondary complaint of left lateral chest pain of around 2 weeks. She indicates the pain worsens with lying down and worsens while she sleeps, but it typically improves when she sits up. However, she has had constant left lateral chest pain today without relief from sitting up. She denies any history of respiratory disease. She remarks she has had history of blood in the past. The patient notes she has been recently getting over a cold as well.    Allergies:  Adhesive Tape  Prednisone  Droperidol     Medications:    Dramamine  Dificid  Reglan   Zofran  Transderm  Ultram  Vancocin     Past Medical History:    ADHD  Bipolar 1 disorder  Cauda equina syndrome  Depressive disorder  GERD  Marginal corneal ulcer, left  Nephrolithiasis  Polysubstance abuse  PTSD    Past Surgical History:    Back surgery for Cauda Equina Syndrome  Insert stent ureters  Removal of kidney stones  Laparoscopic cholecystectomy  Transurethral stone  resection    Family History:    Crohn's disease  Liver cancer  HTN  DM  HLD  Mental illness  Anxiety    Social History:  Presents with wife.  Former smoker.  Negative for alcohol use.  Marital Status:   [2]     Review of Systems   Cardiovascular: Positive for chest pain.   Gastrointestinal: Positive for abdominal pain, diarrhea, nausea and vomiting.   Neurological: Positive for dizziness.   All other systems reviewed and are negative.      Physical Exam     Patient Vitals for the past 24 hrs:   BP Temp Pulse Heart Rate Resp SpO2 Weight   01/31/19 2005 -- -- -- -- -- 97 % --   01/31/19 2000 119/75 -- 69 -- -- 91 % --   01/31/19 1950 -- -- -- -- -- 92 % --   01/31/19 1945 113/84 -- 72 -- -- 95 % --   01/31/19 1940 -- -- -- -- -- 98 % --   01/31/19 1935 -- -- -- -- -- 100 % --   01/31/19 1930 101/80 -- 82 -- -- 100 % --   01/31/19 1928 121/74 -- 89 -- 20 100 % --   01/31/19 1925 -- -- -- -- -- 99 % --   01/31/19 1920 -- -- -- -- -- 100 % --   01/31/19 1915 121/74 -- 84 -- -- 100 % --   01/31/19 1910 -- -- -- -- -- 99 % --   01/31/19 1905 -- -- -- -- -- 99 % --   01/31/19 1900 113/77 -- 81 -- -- -- --   01/31/19 1841 128/89 97.8  F (36.6  C) -- 76 18 100 % 82 kg (180 lb 12.4 oz)     Physical Exam   Constitutional:   Pleasant and cooperative   HENT:   Right Ear: Tympanic membrane normal.   Left Ear: Tympanic membrane normal.   Mouth/Throat: Oropharynx is clear and moist and mucous membranes are normal. No posterior oropharyngeal erythema.   Eyes: Conjunctivae are normal.   Neck: Neck supple.   Cardiovascular: Normal rate, regular rhythm and normal heart sounds.   Pulmonary/Chest: Effort normal and breath sounds normal. She exhibits no tenderness.   Abdominal: Soft. Bowel sounds are normal. She exhibits no distension. There is tenderness in the left upper quadrant. There is no rebound and no guarding.   Musculoskeletal: Normal range of motion.   Neurological: She is alert.   Skin: Skin is warm and dry.    Psychiatric: She has a normal mood and affect.       Emergency Department Course   ECG:  Time: 1910  Vent. Rate 71 bpm. IA interval 164. QRS duration 92. QT/QTc 406/441. P-R-T axis 39 25 23.  Normal sinus rhythm.  Normal ECG.  No significant change compared to EKG dated 8/29/18.  Read time: 1918    Imaging:  Radiographic findings were communicated with the patient who voiced understanding of the findings.    XR Chest, PA & LAT:  Clear lungs. As per radiology.    Laboratory:  UA with micro: Leukocyte Esterase Small, Bacteria Few, Squamous Epithelial 2, Mucous Present, o/w negative    CBC: WBC: 9.6, HGB: 14.8, PLT: 378  CMP: all WNL (Creatinine: 0.78)    Lipase: 163    D dimer: 0.4    1900 Troponin I: <0.015    Interventions:  1923 Benadryl 25 mg IV  1924 Lactated ringers BOLUS 1000 mL IV   Compazine 5 mg IV  2035 Dificid 200 mg PO  2037 Transderm 1 patch Transdermal    Emergency Department Course:  Nursing notes and vitals reviewed. 1900 I performed an exam of the patient as documented above.     EKG obtained in the ED, see results above.     IV inserted. Medicine administered as documented above. Blood drawn. This was sent to the lab for further testing, results above.    The patient was sent for a XR Chest while in the emergency department, findings above.     2040 I rechecked the patient and discussed the results of her workup thus far.     Findings and plan explained to the Patient. Patient discharged home with instructions regarding supportive care, medications, and reasons to return. The importance of close follow-up was reviewed.     I personally reviewed the laboratory results with the Patient and answered all related questions prior to discharge.     Impression & Plan      Medical Decision Making:    Ayana Prasad is a 28 year old female with a complex past medical history who presents to the emergency department with ongoing abdominal pain and diarrhea, left-sided chest pain for several weeks, and nausea  and vomiting causing her to and be unable to take her medication for presumptive C. difficile infection.  Despite her complaints she has normal electrolytes and good renal function.  Her urine does not show unusual concentration or ketones.  I reassured her that she has been doing better on keeping hydrated than she knew.  We were able to give an initial dose here of Dificid.  She also notes that she was supposed to get Transderm-Scop as an outpatient but this required preauthorization.  We have placed a scopolamine patch here to help see her through until she can get her prescription filled as an outpatient.  I will have her follow-up closely with primary care and gastroenterology, return if problems.      Diagnosis:    ICD-10-CM   1. Non-intractable vomiting with nausea, unspecified vomiting type R11.2   2. Chest pain, unspecified type R07.9   3. Abdominal pain, generalized R10.84       Disposition:  discharged to home    IGuille, am serving as a scribe on 1/31/2019 at 6:44 PM to personally document services performed by Edith Hammer MD based on my observations and the provider's statements to me.     Guille Irene  1/31/2019   [facility] EMERGENCY DEPARTMENT       Edith Hammer MD  01/31/19 [id number]

## 2019-02-05 NOTE — TELEPHONE ENCOUNTER
PA Initiation    Medication: TransdermScop Patch - INITIATED  Insurance Company: [employer] [place] - Phone [phone] Fax [phone]  Pharmacy Filling the Rx: [facility] [place] [address]  Filling Pharmacy Phone: [phone]  Filling Pharmacy Fax:    Start Date: 2/5/2019

## 2019-02-06 NOTE — TELEPHONE ENCOUNTER
PA Initiation    Medication: Dificid  Insurance Company: [employer] [place] - Phone [phone] Fax [phone]  Pharmacy Filling the Rx: [facility] [place] - [address]  Filling Pharmacy Phone: [phone]  Filling Pharmacy Fax:    Start Date: 2/6/2019    Central Prior Authorization Team   Phone: [phone]

## 2019-02-07 ENCOUNTER — MYC REFILL (OUTPATIENT)
Dept: OTHER | Age: 29
End: 2019-02-07

## 2019-02-07 DIAGNOSIS — R11.2 NAUSEA AND VOMITING, INTRACTABILITY OF VOMITING NOT SPECIFIED, UNSPECIFIED VOMITING TYPE: ICD-10-CM

## 2019-02-07 RX ORDER — METOCLOPRAMIDE 10 MG/1
10 TABLET ORAL
Qty: 40 TABLET | Refills: 0 | Status: SHIPPED | OUTPATIENT
Start: 2019-02-07 | End: 2019-04-07

## 2019-02-08 NOTE — TELEPHONE ENCOUNTER
Can you please call Ayana and let her know that her insurance will not approve this medication because she has not been on oral metronidazole.  She has to fail both metronidazole and Vanco before her insurance would cover this.    Becki CARVALHOC

## 2019-02-08 NOTE — TELEPHONE ENCOUNTER
Discussed with the pharmacy, routing to the provider.    Ham Patiño RT (r)  [facility]

## 2019-02-08 NOTE — TELEPHONE ENCOUNTER
PRIOR AUTHORIZATION DENIED    Medication: Dificid    Denial Date: 2/6/2019    Denial Rational: Patient must have a history of trial & failure to the formulary alternative(s) or have a contraindication or intolerance to the formulary alternatives: metronidazole        Appeal Information:

## 2019-02-11 NOTE — TELEPHONE ENCOUNTER
PRIOR AUTHORIZATION DENIED    Medication: duplicate - denied    Denial Date: 2/6/2019    Denial Rational:  diagnosis not covered     Appeal Information:

## 2019-02-12 NOTE — TELEPHONE ENCOUNTER
Discussed with the pharmacy    Routed to the provider, looks like needs to try/fail  the promethazine or the meclazine    Not sure why this is labeled as duplicate, had to rename the medication in comments, looks like the cut and paste goes along with requested medication.    Ham Patñio RT (r)  [facility]

## 2019-02-13 NOTE — TELEPHONE ENCOUNTER
Please call Ayana and see how her nausea is.  She had requested a scopolamine patch.  Insurance will not allow and tell has tried and failed to promethazine and meclizine.  Is currently on Reglan and Zofran for nausea.  Do not think it is appropriate to add promethazine however, could add some meclizine if her nausea is not improved.  Please find out if she has followed up with GI regarding her nausea.    Becki CARVALHOC

## 2019-02-24 ENCOUNTER — APPOINTMENT (OUTPATIENT)
Dept: GENERAL RADIOLOGY | Facility: CLINIC | Age: 29
End: 2019-02-24
Attending: EMERGENCY MEDICINE
Payer: COMMERCIAL

## 2019-02-24 ENCOUNTER — HOSPITAL ENCOUNTER (EMERGENCY)
Facility: CLINIC | Age: 29
Discharge: HOME | End: 2019-02-24
Attending: EMERGENCY MEDICINE | Admitting: EMERGENCY MEDICINE
Payer: COMMERCIAL

## 2019-02-24 VITALS
DIASTOLIC BLOOD PRESSURE: 68 MMHG | HEART RATE: 82 BPM | RESPIRATION RATE: 16 BRPM | WEIGHT: 180 LBS | BODY MASS INDEX: 29.05 KG/M2 | TEMPERATURE: 99.9 F | SYSTOLIC BLOOD PRESSURE: 114 MMHG | OXYGEN SATURATION: 97 %

## 2019-02-24 DIAGNOSIS — S39.012A BACK STRAIN, INITIAL ENCOUNTER: ICD-10-CM

## 2019-02-24 DIAGNOSIS — S89.91XA KNEE INJURY, RIGHT, INITIAL ENCOUNTER: ICD-10-CM

## 2019-02-24 DIAGNOSIS — M25.561 ACUTE PAIN OF RIGHT KNEE: ICD-10-CM

## 2019-02-24 PROCEDURE — 99284 EMERGENCY DEPT VISIT MOD MDM: CPT | Mod: Z6 | Performed by: EMERGENCY MEDICINE

## 2019-02-24 PROCEDURE — 29505 APPLICATION LONG LEG SPLINT: CPT | Mod: RT | Performed by: EMERGENCY MEDICINE

## 2019-02-24 PROCEDURE — 72100 X-RAY EXAM L-S SPINE 2/3 VWS: CPT

## 2019-02-24 PROCEDURE — 96374 THER/PROPH/DIAG INJ IV PUSH: CPT | Performed by: EMERGENCY MEDICINE

## 2019-02-24 PROCEDURE — 73562 X-RAY EXAM OF KNEE 3: CPT | Mod: RT

## 2019-02-24 PROCEDURE — 99284 EMERGENCY DEPT VISIT MOD MDM: CPT | Mod: 25 | Performed by: EMERGENCY MEDICINE

## 2019-02-24 PROCEDURE — 25000128 H RX IP 250 OP 636: Performed by: EMERGENCY MEDICINE

## 2019-02-24 RX ORDER — KETOROLAC TROMETHAMINE 30 MG/ML
30 INJECTION, SOLUTION INTRAMUSCULAR; INTRAVENOUS ONCE
Status: COMPLETED | OUTPATIENT
Start: 2019-02-24 | End: 2019-02-24

## 2019-02-24 RX ADMIN — KETOROLAC TROMETHAMINE 30 MG: 30 INJECTION, SOLUTION INTRAMUSCULAR at 19:22

## 2019-02-24 ASSESSMENT — ENCOUNTER SYMPTOMS: BACK PAIN: 1

## 2019-02-24 NOTE — ED AVS SNAPSHOT
[facility], Emergency Department  [address]  [facility] [address]  Phone:  [phone]  Fax:  [phone]                                    Ayana Prasad   MRN: [id number]    Department:  [facility], Emergency Department   Date of Visit:  2/24/2019           After Visit Summary Signature Page    I have received my discharge instructions, and my questions have been answered. I have discussed any challenges I see with this plan with the nurse or doctor.    ..........................................................................................................................................  Patient/Patient Representative Signature      ..........................................................................................................................................  Patient Representative Print Name and Relationship to Patient    ..................................................               ................................................  Date                                   Time    ..........................................................................................................................................  Reviewed by Signature/Title    ...................................................              ..............................................  Date                                               Time          22EPIC Rev 08/18

## 2019-02-25 ENCOUNTER — OFFICE VISIT (OUTPATIENT)
Dept: FAMILY MEDICINE | Facility: CLINIC | Age: 29
End: 2019-02-25
Payer: COMMERCIAL

## 2019-02-25 ENCOUNTER — TELEPHONE (OUTPATIENT)
Dept: ORTHOPEDICS | Facility: CLINIC | Age: 29
End: 2019-02-25

## 2019-02-25 VITALS
RESPIRATION RATE: 20 BRPM | TEMPERATURE: 98.4 F | HEART RATE: 92 BPM | DIASTOLIC BLOOD PRESSURE: 88 MMHG | SYSTOLIC BLOOD PRESSURE: 130 MMHG

## 2019-02-25 DIAGNOSIS — R11.0 NAUSEA: ICD-10-CM

## 2019-02-25 DIAGNOSIS — A04.72 C. DIFFICILE COLITIS: ICD-10-CM

## 2019-02-25 DIAGNOSIS — M51.369 BULGING LUMBAR DISC: ICD-10-CM

## 2019-02-25 DIAGNOSIS — S89.91XD INJURY OF RIGHT KNEE, SUBSEQUENT ENCOUNTER: Primary | ICD-10-CM

## 2019-02-25 PROBLEM — F15.11 HISTORY OF METHAMPHETAMINE ABUSE (H): Status: ACTIVE | Noted: 2019-02-25

## 2019-02-25 PROBLEM — R11.10 VOMITING, INTRACTABILITY OF VOMITING NOT SPECIFIED, PRESENCE OF NAUSEA NOT SPECIFIED, UNSPECIFIED VOMITING TYPE: Status: RESOLVED | Noted: 2018-11-05 | Resolved: 2019-02-25

## 2019-02-25 PROBLEM — R10.9 FLANK PAIN: Status: RESOLVED | Noted: 2018-10-13 | Resolved: 2019-02-25

## 2019-02-25 PROCEDURE — 99214 OFFICE O/P EST MOD 30 MIN: CPT | Performed by: NURSE PRACTITIONER

## 2019-02-25 RX ORDER — TRAMADOL HYDROCHLORIDE 50 MG/1
50 TABLET ORAL EVERY 6 HOURS PRN
Qty: 6 TABLET | Refills: 0 | Status: SHIPPED | OUTPATIENT
Start: 2019-02-25 | End: 2019-04-07

## 2019-02-25 ASSESSMENT — ENCOUNTER SYMPTOMS
SLEEP DISTURBANCE: 0
RHINORRHEA: 0
SHORTNESS OF BREATH: 0
HEADACHES: 0
NERVOUS/ANXIOUS: 0
DYSPHORIC MOOD: 0
COUGH: 0
CONSTIPATION: 0
MYALGIAS: 0
ARTHRALGIAS: 1
CHEST TIGHTNESS: 0
FATIGUE: 0
PALPITATIONS: 0
DIARRHEA: 0
ABDOMINAL PAIN: 0
NUMBNESS: 0
NAUSEA: 1
DIZZINESS: 0
BACK PAIN: 1
WHEEZING: 0
SORE THROAT: 0
ABDOMINAL DISTENTION: 0
LIGHT-HEADEDNESS: 0
VOMITING: 0

## 2019-02-25 ASSESSMENT — PAIN SCALES - GENERAL: PAINLEVEL: SEVERE PAIN (6)

## 2019-02-25 NOTE — PROGRESS NOTES
SUBJECTIVE:   Ayana Prasad is a 28 year old female who presents to clinic today for the following health issues:      ED/UC Followup:    Facility:  [facility] ED  Date of visit: 2/24/19  Reason for visit: Fall  Current Status: Pain in right leg and in back. Concerned about changes on x-ray.         Problem list and histories reviewed & adjusted, as indicated.  Additional history: as documented    Current Outpatient Medications   Medication Sig Dispense Refill     acetaminophen (TYLENOL) 325 MG tablet Take 325-650 mg by mouth every 6 hours as needed for mild pain       metoclopramide (REGLAN) 10 MG tablet Take 1 tablet (10 mg) by mouth 4 times daily (before meals and nightly) 40 tablet 0     NAPROXEN PO        ondansetron (ZOFRAN) 4 MG tablet Take 1 tablet (4 mg) by mouth every 6 hours as needed for nausea 21 tablet 3     scopolamine (TRANSDERM) 1 MG/3DAYS 72 hr patch Place 1 patch onto the skin every 72 hours 10 patch 0     traMADol (ULTRAM) 50 MG tablet Take 1 tablet (50 mg) by mouth every 6 hours as needed for severe pain 6 tablet 0     vancomycin (VANCOCIN) 125 MG capsule Take 1 cap four times per day for 10 days, then 1 tab twice per day for 7 days then 1 cap daily for 7 days, then 1 cap every 3 days for 4 weeks. 75 capsule 0     traMADol (ULTRAM) 50 MG tablet Take 1 tablet (50 mg) by mouth every 6 hours as needed for severe pain (Patient not taking: Reported on 2/25/2019) 30 tablet 0     vancomycin (VANCOCIN HCL) 125 MG capsule Take 125 mg by mouth 4 times daily       Allergies   Allergen Reactions     Adhesive Tape Hives     Prednisone Other (See Comments) and Hives     Suicidal ideation     Droperidol Anxiety       Reviewed and updated as needed this visit by clinical staff  Tobacco  Allergies  Meds  Problems  Med Hx  Surg Hx  Fam Hx  Soc Hx        Reviewed and updated as needed this visit by Provider  Problems          Here today with wife for urgent care follow-up.  2 days ago was standing  in a yard and friend's pit bull came running full force and hit the back of the right knee.  Was seen in urgent care.  X-ray was obtained that is negative.  Most of pain is to back of right knee.  Also has some minimal amount of pain to top of right foot.  Top of right foot has small bruise.  Is currently using knee immobilizer and crutches.  Was encouraged to use ice, rest wear leg immobilizer and keep leg elevated and follow-up here.  Is taking 1000 mg of Tylenol and that is minimally effective.  Stomach is not able to tolerate NSAIDs    Back is also really hurting. Has been putting heat on it.  Taking 1000 mg of Tylenol that is minimally effective.  Had MRI in January 2019 that shows moderate sized left central disc protrusion L5-S1.  Has had injections in the past that were ineffective.  Has been to physical therapy.  Is working with chiropractor.  Has been going to the gym and is working more on range of motion, is not doing any lifting.  Did try the elliptical 1 day but that caused severe back pain the next day.  Occasionally will have pain that goes down to the left leg but comes and goes.  No numbness    Nausea continues. Is using THC under tongue 2-3 times per week.  Has not picked up scopolamine patch yet.  Continues with Zofran and Reglan.  Has been following with GI.    C-diff seems to be much less.  Was seen by GI.  Had to get prior authorization for additional medication for C. difficile.  Only ended up taking gentamicin but symptoms have resolved.    ROS:  Review of Systems   Constitutional: Negative for fatigue.   HENT: Negative for ear pain, rhinorrhea and sore throat.    Eyes: Negative for visual disturbance.   Respiratory: Negative for cough, chest tightness, shortness of breath and wheezing.    Cardiovascular: Negative for chest pain, palpitations and leg swelling.   Gastrointestinal: Positive for nausea. Negative for abdominal distention, abdominal pain, constipation, diarrhea and vomiting.         Looser stools resolving     Endocrine: Negative for cold intolerance and heat intolerance.   Musculoskeletal: Positive for arthralgias (right knee) and back pain (lower). Negative for myalgias.   Skin: Negative for rash.   Neurological: Negative for dizziness, light-headedness, numbness and headaches.   Psychiatric/Behavioral: Negative for dysphoric mood and sleep disturbance. The patient is not nervous/anxious.          OBJECTIVE:     /88 (BP Location: Right arm, Patient Position: Sitting, Cuff Size: Adult Regular)   Pulse 92   Temp 98.4  F (36.9  C) (Tympanic)   Resp 20   LMP 01/31/2019   There is no height or weight on file to calculate BMI.  Physical Exam   Constitutional: She appears well-developed.   HENT:   Right Ear: Tympanic membrane and external ear normal. Tympanic membrane is not erythematous. No middle ear effusion.   Left Ear: Tympanic membrane and external ear normal. Tympanic membrane is not erythematous.  No middle ear effusion.   Nose: No mucosal edema or rhinorrhea.   Cardiovascular: Normal rate, regular rhythm and normal heart sounds.   Pulmonary/Chest: Effort normal and breath sounds normal.   Abdominal: Soft. Bowel sounds are normal.   Musculoskeletal:        Right knee: She exhibits decreased range of motion and swelling (slight over patella). She exhibits no effusion and no erythema.        Lumbar back: She exhibits decreased range of motion, tenderness, bony tenderness, pain and spasm. She exhibits no swelling and no edema.        Legs:  Neurological: She is alert.   Skin: Skin is warm and dry.   Psychiatric: She has a normal mood and affect.       ASSESSMENT/PLAN:   1. Injury of right knee, subsequent encounter  Continue with rest.  Encouraged gradual increase of range of motion.  May continue to use crutches.  Ice.  Elevation.  Significant history of polysubstance abuse in the past.  A long discussion held with Ayana and her wife regarding pain control.  Will prescribe tramadol  50 mg, 6 tablets.  Plans to cut in half and take with Tylenol.  Is aware it is unlikely that this prescription would be refilled  - ORTHO  REFERRAL  - traMADol (ULTRAM) 50 MG tablet; Take 1 tablet (50 mg) by mouth every 6 hours as needed for severe pain  Dispense: 6 tablet; Refill: 0    2. Bulging lumbar disc  Discussed options, physical therapy, referral to surgery, referral to pain center for injection.  Declines all.  Plans to continue physical therapy exercises at home.  Encouraged to avoid heavy lifting, avoid repetitive movements.  Encouraged to avoid harmful behaviors such as riding ATV snowmobiles etc.    3. Nausea  Continue to follow with GI  Encouraged to  scopolamine patch.    4. C. difficile colitis  Seems resolved with gentamicin.  Continue to follow with GI.  No need to  additional medication from pharmacy.      BROOKLYN Cruz [facility]
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
## 2019-02-25 NOTE — TELEPHONE ENCOUNTER
Called patient and offered to schedule a follow up appointment for their ED visit with one of our sports medicine providers. If patient calls back please schedule them with any one in sports med

## 2019-02-25 NOTE — DISCHARGE INSTRUCTIONS
See your regular doctor in 2 days.  See orthopedics in 1 week.  Return here if pain is worse, swelling, hot knee, fever, sick.  Take Tylenol 650 mg orally every 4 hours as needed for pain.

## 2019-03-01 ENCOUNTER — OFFICE VISIT (OUTPATIENT)
Dept: ORTHOPEDICS | Facility: CLINIC | Age: 29
End: 2019-03-01
Payer: COMMERCIAL

## 2019-03-01 VITALS
DIASTOLIC BLOOD PRESSURE: 90 MMHG | BODY MASS INDEX: 28.25 KG/M2 | HEIGHT: 67 IN | WEIGHT: 180 LBS | SYSTOLIC BLOOD PRESSURE: 128 MMHG

## 2019-03-01 DIAGNOSIS — M25.561 ACUTE PAIN OF RIGHT KNEE: Primary | ICD-10-CM

## 2019-03-01 PROCEDURE — 99244 OFF/OP CNSLTJ NEW/EST MOD 40: CPT | Performed by: FAMILY MEDICINE

## 2019-03-01 ASSESSMENT — MIFFLIN-ST. JEOR: SCORE: 1579.1
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
## 2019-03-02 ENCOUNTER — HOSPITAL ENCOUNTER (OUTPATIENT)
Dept: MRI IMAGING | Facility: CLINIC | Age: 29
Discharge: HOME | End: 2019-03-02
Attending: FAMILY MEDICINE | Admitting: FAMILY MEDICINE
Payer: COMMERCIAL

## 2019-03-02 DIAGNOSIS — M25.561 ACUTE PAIN OF RIGHT KNEE: ICD-10-CM

## 2019-03-02 PROCEDURE — 73721 MRI JNT OF LWR EXTRE W/O DYE: CPT | Mod: RT

## 2019-03-06 ENCOUNTER — E-VISIT (OUTPATIENT)
Dept: FAMILY MEDICINE | Facility: CLINIC | Age: 29
End: 2019-03-06

## 2019-03-06 ENCOUNTER — OFFICE VISIT (OUTPATIENT)
Dept: ORTHOPEDICS | Facility: CLINIC | Age: 29
End: 2019-03-06
Payer: COMMERCIAL

## 2019-03-06 VITALS
SYSTOLIC BLOOD PRESSURE: 140 MMHG | WEIGHT: 180 LBS | HEIGHT: 67 IN | BODY MASS INDEX: 28.25 KG/M2 | DIASTOLIC BLOOD PRESSURE: 90 MMHG

## 2019-03-06 DIAGNOSIS — S80.01XD CONTUSION OF RIGHT KNEE, SUBSEQUENT ENCOUNTER: ICD-10-CM

## 2019-03-06 DIAGNOSIS — M54.41 CHRONIC BILATERAL LOW BACK PAIN WITH BILATERAL SCIATICA: Primary | ICD-10-CM

## 2019-03-06 DIAGNOSIS — G89.29 CHRONIC BILATERAL LOW BACK PAIN WITH BILATERAL SCIATICA: Primary | ICD-10-CM

## 2019-03-06 DIAGNOSIS — M54.42 CHRONIC BILATERAL LOW BACK PAIN WITH BILATERAL SCIATICA: Primary | ICD-10-CM

## 2019-03-06 DIAGNOSIS — M51.369 BULGING LUMBAR DISC: Primary | ICD-10-CM

## 2019-03-06 PROCEDURE — 99213 OFFICE O/P EST LOW 20 MIN: CPT | Performed by: FAMILY MEDICINE

## 2019-03-06 PROCEDURE — 99444 ZZC PHYSICIAN ONLINE EVALUATION & MANAGEMENT SERVICE: CPT | Performed by: NURSE PRACTITIONER

## 2019-03-06 ASSESSMENT — MIFFLIN-ST. JEOR: SCORE: 1579.1
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
## 2019-03-07 ENCOUNTER — TELEPHONE (OUTPATIENT)
Dept: PALLIATIVE MEDICINE | Facility: CLINIC | Age: 29
End: 2019-03-07

## 2019-03-07 NOTE — TELEPHONE ENCOUNTER

## 2019-03-07 NOTE — PATIENT INSTRUCTIONS
1. Bulging lumbar disc    2. Contusion of right knee, subsequent encounter      -Patient is here for follow-up of right knee pain and low back pain.  -Patient has right knee pain due to soft tissue contusions.  -MRI of the right knee was reviewed and all questions answered.  No intra-articular injuries were seen.  -Patient is mainly having significant low back pain rating down both legs.  Patient has a history of lumbar surgery and lumbar bulging disc causing foraminal stenosis.  -Patient will be referred to pain management for further workup and treatment.  -Ayana to follow up with Primary Care provider regarding elevated blood pressure.  -Call direct clinic number [[phone]] at any time with questions or concerns.    Albert Yeo MD CAQSM  [place] [facility]  [facility] [place]

## 2019-03-09 ENCOUNTER — NURSE TRIAGE (OUTPATIENT)
Dept: NURSING | Facility: CLINIC | Age: 29
End: 2019-03-09

## 2019-03-10 ENCOUNTER — APPOINTMENT (OUTPATIENT)
Dept: MRI IMAGING | Facility: CLINIC | Age: 29
End: 2019-03-10
Attending: PHYSICIAN ASSISTANT
Payer: COMMERCIAL

## 2019-03-10 ENCOUNTER — HOSPITAL ENCOUNTER (EMERGENCY)
Facility: CLINIC | Age: 29
Discharge: HOME | End: 2019-03-11
Admitting: PHYSICIAN ASSISTANT
Payer: COMMERCIAL

## 2019-03-10 DIAGNOSIS — M54.41 LOW BACK PAIN WITH BILATERAL SCIATICA: ICD-10-CM

## 2019-03-10 DIAGNOSIS — M54.42 LOW BACK PAIN WITH BILATERAL SCIATICA: ICD-10-CM

## 2019-03-10 PROCEDURE — A9585 GADOBUTROL INJECTION: HCPCS | Performed by: RADIOLOGY

## 2019-03-10 PROCEDURE — 25500064 ZZH RX 255 OP 636: Performed by: RADIOLOGY

## 2019-03-10 PROCEDURE — 99285 EMERGENCY DEPT VISIT HI MDM: CPT | Mod: 25

## 2019-03-10 PROCEDURE — 96374 THER/PROPH/DIAG INJ IV PUSH: CPT | Mod: 59

## 2019-03-10 PROCEDURE — 72158 MRI LUMBAR SPINE W/O & W/DYE: CPT

## 2019-03-10 PROCEDURE — 25000132 ZZH RX MED GY IP 250 OP 250 PS 637: Performed by: PHYSICIAN ASSISTANT

## 2019-03-10 PROCEDURE — 25000128 H RX IP 250 OP 636: Performed by: PHYSICIAN ASSISTANT

## 2019-03-10 RX ORDER — GADOBUTROL 604.72 MG/ML
10 INJECTION INTRAVENOUS ONCE
Status: COMPLETED | OUTPATIENT
Start: 2019-03-10 | End: 2019-03-10

## 2019-03-10 RX ORDER — ACETAMINOPHEN 500 MG
1000 TABLET ORAL ONCE
Status: COMPLETED | OUTPATIENT
Start: 2019-03-10 | End: 2019-03-10

## 2019-03-10 RX ORDER — CYCLOBENZAPRINE HCL 10 MG
10 TABLET ORAL ONCE
Status: COMPLETED | OUTPATIENT
Start: 2019-03-10 | End: 2019-03-10

## 2019-03-10 RX ORDER — KETOROLAC TROMETHAMINE 15 MG/ML
15 INJECTION, SOLUTION INTRAMUSCULAR; INTRAVENOUS ONCE
Status: COMPLETED | OUTPATIENT
Start: 2019-03-10 | End: 2019-03-10

## 2019-03-10 RX ORDER — OXYCODONE AND ACETAMINOPHEN 5; 325 MG/1; MG/1
2 TABLET ORAL ONCE
Status: DISCONTINUED | OUTPATIENT
Start: 2019-03-10 | End: 2019-03-10

## 2019-03-10 RX ADMIN — CYCLOBENZAPRINE HYDROCHLORIDE 10 MG: 10 TABLET, FILM COATED ORAL at 23:59

## 2019-03-10 RX ADMIN — KETOROLAC TROMETHAMINE 15 MG: 15 INJECTION, SOLUTION INTRAMUSCULAR; INTRAVENOUS at 23:59

## 2019-03-10 RX ADMIN — ACETAMINOPHEN 1000 MG: 500 TABLET, FILM COATED ORAL at 23:59

## 2019-03-10 RX ADMIN — GADOBUTROL 8 ML: 604.72 INJECTION INTRAVENOUS at 22:27

## 2019-03-10 ASSESSMENT — ENCOUNTER SYMPTOMS
WEAKNESS: 1
NUMBNESS: 1
FEVER: 0
DYSURIA: 0

## 2019-03-10 ASSESSMENT — MIFFLIN-ST. JEOR: SCORE: 1579.1

## 2019-03-10 NOTE — ED AVS SNAPSHOT
[facility]  [address]  [facility] [address]  Phone:  [phone]  Fax:  [phone]                                    Ayana Prasad   MRN: [id number]    Department:  [facility]   Date of Visit:  3/10/2019           After Visit Summary Signature Page    I have received my discharge instructions, and my questions have been answered. I have discussed any challenges I see with this plan with the nurse or doctor.    ..........................................................................................................................................  Patient/Patient Representative Signature      ..........................................................................................................................................  Patient Representative Print Name and Relationship to Patient    ..................................................               ................................................  Date                                   Time    ..........................................................................................................................................  Reviewed by Signature/Title    ...................................................              ..............................................  Date                                               Time          22EPIC Rev 08/18
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
## 2019-03-11 VITALS
BODY MASS INDEX: 28.25 KG/M2 | RESPIRATION RATE: 18 BRPM | HEIGHT: 67 IN | HEART RATE: 86 BPM | OXYGEN SATURATION: 100 % | DIASTOLIC BLOOD PRESSURE: 75 MMHG | SYSTOLIC BLOOD PRESSURE: 111 MMHG | WEIGHT: 180 LBS | TEMPERATURE: 97.4 F
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
## 2019-03-13 DIAGNOSIS — N92.0 MENORRHAGIA WITH REGULAR CYCLE: Primary | ICD-10-CM

## 2019-03-13 DIAGNOSIS — R20.0 RIGHT LEG NUMBNESS: ICD-10-CM

## 2019-03-13 DIAGNOSIS — M51.369 BULGING LUMBAR DISC: ICD-10-CM

## 2019-03-13 DIAGNOSIS — R53.1 WEAKNESS: ICD-10-CM

## 2019-03-13 RX ORDER — NORELGESTROMIN AND ETHINYL ESTRADIOL 35; 150 UG/MG; UG/MG
PATCH TRANSDERMAL
Qty: 12 PATCH | Refills: 3 | Status: SHIPPED | OUTPATIENT
Start: 2019-03-13 | End: 2019-06-26

## 2019-03-16 ASSESSMENT — ENCOUNTER SYMPTOMS
SKIN CHANGES: 0
PARALYSIS: 0
TREMORS: 0
JOINT SWELLING: 0
PANIC: 0
MEMORY LOSS: 0
DIARRHEA: 1
INCREASED ENERGY: 1
ABDOMINAL PAIN: 0
BOWEL INCONTINENCE: 0
POOR WOUND HEALING: 0
NAIL CHANGES: 0
INSOMNIA: 1
POLYDIPSIA: 0
DECREASED CONCENTRATION: 1
MUSCLE CRAMPS: 0
WEIGHT GAIN: 0
HEADACHES: 1
HEARTBURN: 0
STIFFNESS: 1
BLOATING: 0
POLYPHAGIA: 0
NUMBNESS: 1
ALTERED TEMPERATURE REGULATION: 1
FATIGUE: 1
DEPRESSION: 0
DIZZINESS: 1
TINGLING: 0
VOMITING: 1
WEIGHT LOSS: 1
CHILLS: 0
RECTAL PAIN: 0
MUSCLE WEAKNESS: 1
JAUNDICE: 0
NAUSEA: 1
LOSS OF CONSCIOUSNESS: 0
DECREASED APPETITE: 1
NERVOUS/ANXIOUS: 1
NECK PAIN: 1
BLOOD IN STOOL: 0
BACK PAIN: 1
NIGHT SWEATS: 1
CONSTIPATION: 0
MYALGIAS: 0
ARTHRALGIAS: 1
DISTURBANCES IN COORDINATION: 1
SPEECH CHANGE: 0
WEAKNESS: 1
HALLUCINATIONS: 0
FEVER: 1
SEIZURES: 0

## 2019-03-19 ENCOUNTER — PRE VISIT (OUTPATIENT)
Dept: NEUROSURGERY | Facility: CLINIC | Age: 29
End: 2019-03-19

## 2019-03-19 NOTE — TELEPHONE ENCOUNTER
RECORDS RECEIVED FROM: Internal   DATE RECEIVED: 3/25/19   NOTES (FOR ALL VISITS) STATUS DETAILS   OFFICE NOTE from referring provider Internal 3.9.19 MyChart   OFFICE NOTE from other specialist N/A    DISCHARGE SUMMARY from hospital N/A    DISCHARGE REPORT from the ER Internal 3.10.19   OPERATIVE REPORT N/A    MEDICATION LIST Internal    IMAGING  (FOR ALL VISITS)     EMG N/A    EEG N/A    ECT Care Everywhere [facility] - 12.7.18 EKG   MRI (HEAD, NECK, SPINE) Internal/External 3.10.19 Lumbar Spine  1.4.19 Lumbar Spine  [facility] - 8.11.17 Lumbar   CT (HEAD, NECK, SPINE) External [facility] - 12.7.18 CT Head  [facility] - 5.8.17 CT Head      Action    Action Taken Called [facility] to push images  Called [facility] to push images       Action    Action Taken Have received all requested images.
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
## 2019-03-22 ENCOUNTER — PRE VISIT (OUTPATIENT)
Dept: NEUROSURGERY | Facility: CLINIC | Age: 29
End: 2019-03-22

## 2019-03-22 ENCOUNTER — OFFICE VISIT (OUTPATIENT)
Dept: FAMILY MEDICINE | Facility: CLINIC | Age: 29
End: 2019-03-22
Payer: COMMERCIAL

## 2019-03-22 VITALS
SYSTOLIC BLOOD PRESSURE: 118 MMHG | WEIGHT: 166.8 LBS | HEART RATE: 132 BPM | HEIGHT: 67 IN | TEMPERATURE: 98.1 F | DIASTOLIC BLOOD PRESSURE: 86 MMHG | BODY MASS INDEX: 26.18 KG/M2 | RESPIRATION RATE: 24 BRPM

## 2019-03-22 DIAGNOSIS — R00.0 TACHYCARDIA: ICD-10-CM

## 2019-03-22 DIAGNOSIS — R63.4 WEIGHT LOSS: ICD-10-CM

## 2019-03-22 DIAGNOSIS — R07.9 CHEST PAIN, UNSPECIFIED TYPE: ICD-10-CM

## 2019-03-22 DIAGNOSIS — F39 EPISODIC MOOD DISORDER (H): ICD-10-CM

## 2019-03-22 DIAGNOSIS — G47.9 SLEEP DISTURBANCE: ICD-10-CM

## 2019-03-22 DIAGNOSIS — Z00.00 ROUTINE GENERAL MEDICAL EXAMINATION AT A HEALTH CARE FACILITY: Primary | ICD-10-CM

## 2019-03-22 LAB
ALBUMIN SERPL-MCNC: 4 G/DL (ref 3.4–5)
ALP SERPL-CCNC: 67 U/L (ref 40–150)
ALT SERPL W P-5'-P-CCNC: 48 U/L (ref 0–50)
ANION GAP SERPL CALCULATED.3IONS-SCNC: 9 MMOL/L (ref 3–14)
AST SERPL W P-5'-P-CCNC: 21 U/L (ref 0–45)
BASOPHILS # BLD AUTO: 0 10E9/L (ref 0–0.2)
BASOPHILS NFR BLD AUTO: 0.5 %
BILIRUB SERPL-MCNC: 0.5 MG/DL (ref 0.2–1.3)
BUN SERPL-MCNC: 13 MG/DL (ref 7–30)
CALCIUM SERPL-MCNC: 9 MG/DL (ref 8.5–10.1)
CHLORIDE SERPL-SCNC: 109 MMOL/L (ref 94–109)
CK SERPL-CCNC: 46 U/L (ref 30–225)
CO2 SERPL-SCNC: 21 MMOL/L (ref 20–32)
CREAT SERPL-MCNC: 0.71 MG/DL (ref 0.52–1.04)
DIFFERENTIAL METHOD BLD: ABNORMAL
EOSINOPHIL # BLD AUTO: 0.1 10E9/L (ref 0–0.7)
EOSINOPHIL NFR BLD AUTO: 1.4 %
ERYTHROCYTE [DISTWIDTH] IN BLOOD BY AUTOMATED COUNT: 13 % (ref 10–15)
GFR SERPL CREATININE-BSD FRML MDRD: >90 ML/MIN/{1.73_M2}
GLUCOSE SERPL-MCNC: 167 MG/DL (ref 70–99)
HCT VFR BLD AUTO: 43.3 % (ref 35–47)
HGB BLD-MCNC: 15.2 G/DL (ref 11.7–15.7)
LYMPHOCYTES # BLD AUTO: 2.4 10E9/L (ref 0.8–5.3)
LYMPHOCYTES NFR BLD AUTO: 28.7 %
MCH RBC QN AUTO: 28.1 PG (ref 26.5–33)
MCHC RBC AUTO-ENTMCNC: 35.1 G/DL (ref 31.5–36.5)
MCV RBC AUTO: 80 FL (ref 78–100)
MONOCYTES # BLD AUTO: 0.4 10E9/L (ref 0–1.3)
MONOCYTES NFR BLD AUTO: 4.2 %
NEUTROPHILS # BLD AUTO: 5.5 10E9/L (ref 1.6–8.3)
NEUTROPHILS NFR BLD AUTO: 65.2 %
PLATELET # BLD AUTO: 324 10E9/L (ref 150–450)
POTASSIUM SERPL-SCNC: 3.2 MMOL/L (ref 3.4–5.3)
PROT SERPL-MCNC: 8 G/DL (ref 6.8–8.8)
RBC # BLD AUTO: 5.4 10E12/L (ref 3.8–5.2)
SODIUM SERPL-SCNC: 139 MMOL/L (ref 133–144)
TSH SERPL DL<=0.005 MIU/L-ACNC: 1.09 MU/L (ref 0.4–4)
WBC # BLD AUTO: 8.4 10E9/L (ref 4–11)

## 2019-03-22 PROCEDURE — 84443 ASSAY THYROID STIM HORMONE: CPT | Performed by: NURSE PRACTITIONER

## 2019-03-22 PROCEDURE — 99395 PREV VISIT EST AGE 18-39: CPT | Performed by: NURSE PRACTITIONER

## 2019-03-22 PROCEDURE — 93000 ELECTROCARDIOGRAM COMPLETE: CPT | Performed by: NURSE PRACTITIONER

## 2019-03-22 PROCEDURE — 82550 ASSAY OF CK (CPK): CPT | Performed by: NURSE PRACTITIONER

## 2019-03-22 PROCEDURE — 99214 OFFICE O/P EST MOD 30 MIN: CPT | Mod: 25 | Performed by: NURSE PRACTITIONER

## 2019-03-22 PROCEDURE — 82306 VITAMIN D 25 HYDROXY: CPT | Performed by: NURSE PRACTITIONER

## 2019-03-22 PROCEDURE — 82607 VITAMIN B-12: CPT | Performed by: NURSE PRACTITIONER

## 2019-03-22 PROCEDURE — 85025 COMPLETE CBC W/AUTO DIFF WBC: CPT | Performed by: NURSE PRACTITIONER

## 2019-03-22 PROCEDURE — 80053 COMPREHEN METABOLIC PANEL: CPT | Performed by: NURSE PRACTITIONER

## 2019-03-22 PROCEDURE — 36415 COLL VENOUS BLD VENIPUNCTURE: CPT | Performed by: NURSE PRACTITIONER

## 2019-03-22 RX ORDER — TRAZODONE HYDROCHLORIDE 50 MG/1
TABLET, FILM COATED ORAL
Qty: 30 TABLET | Refills: 0 | Status: SHIPPED | OUTPATIENT
Start: 2019-03-22 | End: 2019-04-07

## 2019-03-22 ASSESSMENT — ENCOUNTER SYMPTOMS
SHORTNESS OF BREATH: 0
FATIGUE: 0
HEADACHES: 0
PALPITATIONS: 0
NERVOUS/ANXIOUS: 0
CONSTIPATION: 0
FREQUENCY: 0
SORE THROAT: 0
WHEEZING: 0
VOMITING: 0
BACK PAIN: 1
DIZZINESS: 0
ABDOMINAL PAIN: 0
CHEST TIGHTNESS: 1
DIARRHEA: 0
POLYPHAGIA: 0
NUMBNESS: 0
UNEXPECTED WEIGHT CHANGE: 1
DIFFICULTY URINATING: 0
COUGH: 0
WEAKNESS: 1
ARTHRALGIAS: 0
ACTIVITY CHANGE: 0
RHINORRHEA: 0
LIGHT-HEADEDNESS: 0
SLEEP DISTURBANCE: 1
ABDOMINAL DISTENTION: 0
NAUSEA: 0
DYSPHORIC MOOD: 0
POLYDIPSIA: 0

## 2019-03-22 ASSESSMENT — MIFFLIN-ST. JEOR: SCORE: 1515.26

## 2019-03-22 ASSESSMENT — PAIN SCALES - GENERAL: PAINLEVEL: MODERATE PAIN (5)

## 2019-03-23 LAB
DEPRECATED CALCIDIOL+CALCIFEROL SERPL-MC: 23 UG/L (ref 20–75)
VIT B12 SERPL-MCNC: 1207 PG/ML (ref 193–986)

## 2019-03-24 ENCOUNTER — NURSE TRIAGE (OUTPATIENT)
Dept: NURSING | Facility: CLINIC | Age: 29
End: 2019-03-24

## 2019-03-24 NOTE — TELEPHONE ENCOUNTER
Had labs drawn on Friday , and has some abnormal results and is concernedvand worried about the results and is worried that she has cancer or something is really wrong.  Looked at lab that was concerning her, Vit B 12 , elevated, but not critical. Told her to get her information from her Dr not from the internet,because it makes a lot more sense and it won't scare her as bad. Told her to call her clinic in the morning and ask to talk with her PCP. She is agreeable with the plan, and feels much better.    Martha Silverman RN/ [facility]    Additional Information    Health Information question, no triage required and triager able to answer question    Protocols used: INFORMATION ONLY CALL-ADULT-

## 2019-03-25 ENCOUNTER — TELEPHONE (OUTPATIENT)
Dept: FAMILY MEDICINE | Facility: CLINIC | Age: 29
End: 2019-03-25

## 2019-03-25 ENCOUNTER — PRE VISIT (OUTPATIENT)
Dept: NEUROLOGY | Facility: CLINIC | Age: 29
End: 2019-03-25

## 2019-03-25 ENCOUNTER — OFFICE VISIT (OUTPATIENT)
Dept: NEUROLOGY | Facility: CLINIC | Age: 29
End: 2019-03-25
Attending: NURSE PRACTITIONER
Payer: COMMERCIAL

## 2019-03-25 VITALS
OXYGEN SATURATION: 97 % | DIASTOLIC BLOOD PRESSURE: 94 MMHG | HEIGHT: 67 IN | TEMPERATURE: 98 F | HEART RATE: 137 BPM | BODY MASS INDEX: 26.18 KG/M2 | WEIGHT: 166.8 LBS | RESPIRATION RATE: 16 BRPM | SYSTOLIC BLOOD PRESSURE: 133 MMHG

## 2019-03-25 DIAGNOSIS — R20.0 RIGHT LEG NUMBNESS: Primary | ICD-10-CM

## 2019-03-25 PROBLEM — Z71.89 ACP (ADVANCE CARE PLANNING): Status: ACTIVE | Noted: 2017-02-03

## 2019-03-25 ASSESSMENT — MIFFLIN-ST. JEOR: SCORE: 1514.46

## 2019-03-25 ASSESSMENT — PAIN SCALES - GENERAL: PAINLEVEL: SEVERE PAIN (7)

## 2019-03-25 NOTE — PROGRESS NOTES
[facility]  Neurology Consult     Ayana Prasad MRN# [id number]   YOB: 1990 Age: 28 year old     Requesting provider: Becki Avery NP         Assessment and Recommendations:   Ayana Prasad is a 28 year old female with a history of cauda equina syndrome, chronic low back pain and left lower extremity numbness who was referred to the neurology clinic for further evaluation of right lower extremity numbness.    She describes 2-1/2 weeks of right leg numbness without a clear sensory level.  She describes gradual loss of sensation starting at the mid thigh of the right leg and worsening to become numb in her distal leg.  Interestingly, the sensation over the sole of her right foot is intact.  Her exam shows give way weakness throughout bilaterally, but she is able to toe and heel walk.  Reflexes in the right leg are intact, she has a dropped Achilles reflex on the left, which is chronic.  She had a recent lumbar spine MRI showing postoperative changes from a laminectomy L5-S1, and a bulging disc at L5-S1 causing mild narrowing of the left lateral recess below the nerve root does not appear to be compressed.    For further evaluation of her symptoms, I recommend an EMG evaluate for a possible radiculopathy.  If this were unrevealing, then I would recommend conservative treatment measures, including physical therapy, heat, rest, acetaminophen or NSAIDs.  I also offered a trial of gabapentin, but she would like to hold off on this for now.    She would prefer to receive the results of the EMG via my chart.    Aleshia Tovar MD  Neurology  Pager: [phone]            Chief Complaint:   Chief Complaint   Patient presents with     Numbness     [facility] NEW CONSULTATION BILATERAL LEG WEAKNESS, RIGHT LEG NUMBNESS     Extremity Weakness           History is obtained from the patient and medical record.      Ayana Prasad is a 28 year old female who has been  suffering from right lower extremity numbness for the past 3 weeks.    Approximately 3 weeks ago, she was knocked down to the ground by a dog and fell on her back.  She noticed that her right knee hurts initially, but this improved.  Then her symptoms began; she recalls a nerve pain radiating down both of her legs, affecting her low back as well.  This has also since resolved.    For the last 2-1/2 weeks, she describes bilateral leg numbness that has been progressively worsening.  She describes this as waking up, and not being able to feel her legs.  She tells me that she has chronic left leg numbness since a previous lumbar surgery, and that the left leg is unchanged.  The new symptoms include right leg numbness.  She feels like she cannot stand on it, and cannot support herself.  She also feels like her knees are weak.    In addition to these symptoms, she also has constant low back pain, as well as left shoulder, neck, and chest pain, which has been previously evaluated.  Other symptoms, including significant weight loss of 15 pounds over the past month, dizziness with heart racing are also described.    When asked about her symptoms today, she focuses on fatigue, and she reports this is her most bothersome symptom.  She says that she does not sleep at night, and can spend days awake.    She denies bowel or bladder changes.  She is able to walk short distances and can do some stairs.  Prolonged walking worsens her symptoms, particular her pain and generalized weakness.    She is previously diagnosed with cauda equina syndrome, and underwent surgical intervention and inpatient physical therapy with improvement.  She has been left with left lower extremity numbness as a result.    She is a history of an L5-S1 laminectomy.  A recent lumbar spine MRI showed mild narrowing of the left lateral recess at L5-S1, although the nerve root was not compressed.  There is also mild to moderate neuroforaminal narrowing  bilaterally at L5-S1.            Past Medical History:     Past Medical History:   Diagnosis Date     ADHD (attention deficit hyperactivity disorder)      Bipolar 1 disorder, manic, mild      Cauda equina syndrome      Depressive disorder      GERD (gastroesophageal reflux disease)      Marginal corneal ulcer, left 7/17/2015     Nephrolithiasis      Polysubstance abuse     currently in remission              Past Surgical History:     Past Surgical History:   Procedure Laterality Date     BACK SURGERY - For Cauda Equina Syndrome  12/24/2016     COLONOSCOPY       COMBINED CYSTOSCOPY, INSERT STENT URETER(S) Left 8/30/2018    Procedure: COMBINED CYSTOSCOPY, INSERT STENT URETER(S);  Cystoscopy With Left Stent Placement;  Surgeon: Kiran Ulrich MD;  Location: [facility]     COMBINED CYSTOSCOPY, RETROGRADES, EXCHANGE STENT URETER(S) Left 10/14/2018    Procedure: COMBINED CYSTOSCOPY, RETROGRADES, EXCHANGE STENT URETER(S);  Cystoscopy and removal of left-sided stent.;  Surgeon: Stiven Olivo MD;  Location:  OR     COMBINED CYSTOSCOPY, RETROGRADES, URETEROSCOPY, INSERT STENT  1/6/2014    Procedure: COMBINED CYSTOSCOPY, RETROGRADES, URETEROSCOPY, INSERT STENT;  Cystyoscopy place left ureteral stent;  Surgeon: Jaun Kimble MD;  Location: [facility] OR     COMBINED CYSTOSCOPY, RETROGRADES, URETEROSCOPY, INSERT STENT Left 10/23/2018    Procedure: Video cystoscopy, left ureteroscopy with stone extraction;  Surgeon: Bull Mast MD;  Location:  OR     CYSTOSCOPY, URETEROSCOPY, COMBINED Right 9/23/2015    Procedure: COMBINED CYSTOSCOPY, URETEROSCOPY;  Surgeon: ROME Jtet MD;  Location: [facility] OR     ENT SURGERY       ESOPHAGOSCOPY, GASTROSCOPY, DUODENOSCOPY (EGD), COMBINED  4/8/2013    Procedure: COMBINED ESOPHAGOSCOPY, GASTROSCOPY, DUODENOSCOPY (EGD), BIOPSY SINGLE OR MULTIPLE;  Gastroscopy;  Surgeon: Peter Pickard MD;  Location: [facility]     ESOPHAGOSCOPY, GASTROSCOPY, DUODENOSCOPY (EGD),  COMBINED Left 10/28/2014    Procedure: COMBINED ESOPHAGOSCOPY, GASTROSCOPY, DUODENOSCOPY (EGD), BIOPSY SINGLE OR MULTIPLE;  Surgeon: Narcisa Ramirez MD;  Location:  OR     ESOPHAGOSCOPY, GASTROSCOPY, DUODENOSCOPY (EGD), COMBINED N/A 12/24/2018    Procedure: COMBINED ESOPHAGOSCOPY, GASTROSCOPY, DUODENOSCOPY (EGD), BIOPSY SINGLE OR MULTIPLE;  Surgeon: Sonu Verduzco MD;  Location: [facility]     LAPAROSCOPIC CHOLECYSTECTOMY  11/20/2014    [facility], Dr. Ramirez     LASER HOLMIUM LITHOTRIPSY URETER(S), INSERT STENT, COMBINED  4/2/2014    Procedure: COMBINED CYSTOSCOPY, URETEROSCOPY, LASER HOLMIUM LITHOTRIPSY URETER(S), INSERT STENT;  Cystoscopy,Left Ureteral Stent Removal,Left Ureteroscopy with Laser Lithotripsy,Left Ureter Stent Placement;  Surgeon: ROME Jett MD;  Location: [facility] OR     Transurethral stone resection  03/11/2011             Social History:     Social History     Socioeconomic History     Marital status:      Spouse name: Not on file     Number of children: 0     Years of education: Not on file     Highest education level: Not on file   Occupational History     Employer: [email or web address]   Social Needs     Financial resource strain: Not on file     Food insecurity:     Worry: Not on file     Inability: Not on file     Transportation needs:     Medical: Not on file     Non-medical: Not on file   Tobacco Use     Smoking status: Former Smoker     Packs/day: 0.50     Years: 5.00     Pack years: 2.50     Types: Other     Start date: 8/1/2011     Smokeless tobacco: Current User     Types: Chew     Tobacco comment: Smokes E-cig   Substance and Sexual Activity     Alcohol use: No     Alcohol/week: 0.0 oz     Drug use: No     Types: Marijuana, IV, Codeine     Comment: past use of marijuana, heroin and cocaine     Sexual activity: Yes     Partners: Female     Birth control/protection: None   Lifestyle     Physical activity:     Days per week: Not on file     Minutes per session: Not on file      Stress: Not on file   Relationships     Social connections:     Talks on phone: Not on file     Gets together: Not on file     Attends [employer]ology service: Not on file     Active member of club or organization: Not on file     Attends meetings of clubs or organizations: Not on file     Relationship status: Not on file     Intimate partner violence:     Fear of current or ex partner: Not on file     Emotionally abused: Not on file     Physically abused: Not on file     Forced sexual activity: Not on file   Other Topics Concern     Parent/sibling w/ CABG, MI or angioplasty before 65F 55M? No   Social History Narrative    Works at [employer].              Family History:     Family History   Problem Relation Age of Onset     Gastrointestinal Disease Father         Crohn's Disease     Cancer Father         Liver Cancer     Other Cancer Father         Liver     Cancer Maternal Grandmother         lympoma     Diabetes Maternal Grandmother      Mental Illness Maternal Grandmother      Other Cancer Maternal Grandmother         Non Hodgkins Lymphoma     Diabetes Maternal Grandfather      Hypertension Maternal Grandfather      Prostate Cancer Maternal Grandfather      Hyperlipidemia Maternal Grandfather      Heart Disease Paternal Grandfather         heart disease     Hypertension Brother      Other Cancer Brother         Esophegeal cancer     Diabetes Brother      Hyperlipidemia Mother      Mental Illness Mother      Anxiety Disorder Mother      Anesthesia Reaction Mother      Hypertension Brother      Other Cancer Brother      Other Cancer Paternal Half-Brother         Esophageal             Allergies:      Allergies   Allergen Reactions     Adhesive Tape Hives     Percocet [Oxycodone-Acetaminophen]      Nausea and vomiting       Prednisone Other (See Comments) and Hives     Suicidal ideation     Droperidol Anxiety             Medications:     Current Outpatient Medications:      metoclopramide (REGLAN) 10 MG tablet, Take 1  "tablet (10 mg) by mouth 4 times daily (before meals and nightly) (Patient not taking: Reported on 3/22/2019), Disp: 40 tablet, Rfl: 0     norelgestromin-ethinyl estradiol (ORTHO EVRA) 150-35 MCG/24HR patch, Remove old patch and apply new patch onto the skin once a week for 3 weeks (21 days). Ok to wear consistently (Patient not taking: Reported on 3/22/2019), Disp: 12 patch, Rfl: 3     ondansetron (ZOFRAN) 4 MG tablet, Take 1 tablet (4 mg) by mouth every 6 hours as needed for nausea (Patient not taking: Reported on 3/22/2019), Disp: 21 tablet, Rfl: 3     traMADol (ULTRAM) 50 MG tablet, Take 1 tablet (50 mg) by mouth every 6 hours as needed for severe pain (Patient not taking: Reported on 3/22/2019), Disp: 30 tablet, Rfl: 0     traZODone (DESYREL) 50 MG tablet, Take 1 at night as needed to help with sleep, may repeat in 30 minutes if is ineffective (Patient not taking: Reported on 3/25/2019), Disp: 30 tablet, Rfl: 0          Review of Systems:   Negative, with the exception of dizziness, palpitations, fatigue, weight loss, back pain, knee pain, weakness, numbness, and as noted in the HPI.         Physical Exam:   BP (!) 133/94   Pulse 137   Temp 98  F (36.7  C) (Oral)   Resp 16   Ht 1.694 m (5' 6.7\")   Wt 75.7 kg (166 lb 12.8 oz)   SpO2 97%   BMI 26.36 kg/m     Physical Exam:   General: NAD, lying on her side on the exam table, but able to sit up, stand, and walk when instructed.  Neurologic:   Mental Status Exam: Alert, awake and oriented to situation. No dysarthria. Speech of normal fluency.   Cranial Nerves: Pupils equal, minimally reactive to light, EOMs intact, no nystagmus, facial movements symmetric, facial sensation intact to light touch, hearing intact to conversation, trapezius and SCMs 5/5 bilaterally, tongue midline and fully mobile. No tongue atrophy or fasciculations.    Motor: Normal tone in all four extremities, no atrophy or fasciculations. 5/5 strength bilaterally in shoulder abduction, elbow " extensors and flexors, wrist extensors and flexors, hip flexors, knee extensors and flexors, dorsi- and plantarflexion. No tremors or abnormal movements noted.   Sensory: Sensation subjectively decreased to pinprick and light touch on arms and legs bilaterally.  Vibration intact to 10 seconds at the right toe, absent at the right patella.   Coordination: Finger-nose-finger intact bilaterally.  Rapidly alternating movements intact bilaterally in the upper extremities.  Normal finger tapping bilaterally.  Normal Romberg.   Reflexes: 2+ and symmetric in triceps, biceps, brachioradialis, patellar, absent Achilles on the left, and plantars downgoing bilaterally.   Gait: Normal gait.  Able to toe and heel walk.  Tandem gait with difficulty.  Head: Normocephalic, atraumatic.   Neck: Normal range of motion with lateral head movements and neck flexion.  Eyes: No conjunctival injection, no scleral icterus.   Respiratory: No increased work of breathing.  Cardiovascular: No lower extremity edema.  Extremities: Warm, dry.          Data:     MRI lumbar spine w and wo contrast (3/10/2019):  1. Changes from a laminectomy at L5-S1.  2. No central spinal stenosis. No epidural abscess or hematoma.  3. Bulging disc at L5-S1 causes mild narrowing of the left lateral  recess, though the nerve root does not appear to be compressed.  4. There is mild to moderate neural foraminal narrowing bilaterally at  L5-S1.

## 2019-03-25 NOTE — ANESTHESIA PREPROCEDURE EVALUATION
Naomi Mason, RN called Su Keith today to discuss hoding of warfarin and the pending EGD.  The patient was not reached by either Triage Nurse.  A message was left to call the clinic.   PAC NOTE:       ANESTHESIA PRE EVALUATION:  Anesthesia Evaluation     .             ROS/MED HX    ENT/Pulmonary:     (+)tobacco use, Current use , . .    Neurologic:     (+)neuropathy - Cauda Equinq Syndrome after back surgery,    (-) TIA, Other neuro hx and Dementia   Cardiovascular:        (-) CAD, CHF, arrhythmias, pulmonary hypertension and dyslipidemia   METS/Exercise Tolerance:     Hematologic:         Musculoskeletal:        (-) arthritis   GI/Hepatic:     (+) GERD      (-) hiatal hernia and hepatitis   Renal/Genitourinary:     (+) Nephrolithiasis ,       Endo:     (+) Obesity, .   (-) Type I DM, Type II DM, thyroid disease, chronic steroid usage and other endocrine disorder   Psychiatric:     (+) psychiatric history anxiety      Infectious Disease:  - neg infectious disease ROS       Malignancy:         Other:                     Physical Exam      Airway   Mallampati: II  TM distance: >3 FB  Neck ROM: full    Dental     Cardiovascular   Rhythm and rate: regular and normal  (-) no murmur    Pulmonary    breath sounds clear to auscultation    Other findings: Lab Test        10/13/18     08/29/18     08/28/18      --          01/06/14                       1705          0532          1217           --           2000          WBC          10.3         8.9          10.3           < >        11.0          HGB          14.5         12.5         15.5           < >        13.4          MCV          85           86           84             < >        85            PLT          331          257          363            < >        300           INR           --           --           --           --          1.18*          < > = values in this interval not displayed.                  Lab Test        10/13/18     08/29/18     08/28/18                       1705          0532          1218          NA           140          143          139           POTASSIUM    3.9          3.7          4.1           CHLORIDE     110*          111*         108           CO2          20           25           20            BUN          14           11           13            CR           0.85         0.78         0.82          ANIONGAP     10           7            11            WILLIAMS          9.3          8.4*         9.1           GLC          88           88           105*                 Anesthesia Plan      History & Physical Review  History and physical reviewed and following examination; no interval change.    ASA Status:  2 .    NPO Status:  > 8 hours    Plan for MAC with Propofol induction. Reason for MAC:  Deep or markedly invasive procedure (G8) and Extreme anxiety (QS)  PONV prophylaxis:  Ondansetron (or other 5HT-3)       Postoperative Care  Postoperative pain management:  IV analgesics and Oral pain medications.      Consents  Anesthetic plan, risks, benefits and alternatives discussed with:  Patient..                            .

## 2019-03-25 NOTE — TELEPHONE ENCOUNTER
Spoke wit patient  Reviewed below, advised it sx change or worsen she may seek ED if need prior to appt firday with Gena  May need referral to hematology for high B 12 as pt DENIES taking any supplements,  ?A1c   Due pt sx and high glucose on CMP   Is not sleeping  Discussed trying the trazdone  Since she has been off it for while to see if she can get some sleep  The neurologist did not have any answers for her   Keep sched appt   [facility]  RN/Faustino Pearson  Viewed by Ayana Prasad on 3/25/2019 9:29 AM   Written by Becki Avery, [place] CNP on 3/25/2019 9:17 AM   Ayana,   Your Vit D is on the low end of normal. You need to start taking Vitamin D3 2000 units daily and plan to recheck your Vitamin D level again in 3 months.     Are you taking a Vit V 12 supplement? If so, please stop-you level is elevated.     Potassium is a bit low try to eat high potassium foods like bananas, potatoes and avocados. Should plan to recheck this in 2 weeks. Please call and make a lab only appointment for date and time that works best for you.     Your blood counts are all in normal range   Your electrolytes are all in normal range, except your potassium as mentioned above   Your kidney function is normal   Your liver function is normal   Your thyroid is in normal range   Your glucose (blood sugar) is in normal range for being non-fasting       Please keep your appointments that you already have scheduled.     Becki CARVALHOC

## 2019-03-25 NOTE — LETTER
3/25/2019       RE: Ayana Prasad  [address]  [place] [address]     Dear Colleague,    Thank you for referring your patient, Ayana Prasad, to the [facility] NEUROLOGY at [facility]. Please see a copy of my visit note below.    [facility]  Neurology Consult     Ayana Prasad MRN# [id number]   YOB: 1990 Age: 28 year old     Requesting provider: Becki Avery NP         Assessment and Recommendations:   Ayana Prasad is a 28 year old female with a history of cauda equina syndrome, chronic low back pain and left lower extremity numbness who was referred to the neurology clinic for further evaluation of right lower extremity numbness.    She describes 2-1/2 weeks of right leg numbness without a clear sensory level.  She describes gradual loss of sensation starting at the mid thigh of the right leg and worsening to become numb in her distal leg.  Interestingly, the sensation over the sole of her right foot is intact.  Her exam shows give way weakness throughout bilaterally, but she is able to toe and heel walk.  Reflexes in the right leg are intact, she has a dropped Achilles reflex on the left, which is chronic.  She had a recent lumbar spine MRI showing postoperative changes from a laminectomy L5-S1, and a bulging disc at L5-S1 causing mild narrowing of the left lateral recess below the nerve root does not appear to be compressed.    For further evaluation of her symptoms, I recommend an EMG evaluate for a possible radiculopathy.  If this were unrevealing, then I would recommend conservative treatment measures, including physical therapy, heat, rest, acetaminophen or NSAIDs.  I also offered a trial of gabapentin, but she would like to hold off on this for now.    She would prefer to receive the results of the EMG via my chart.              Chief Complaint:   Chief Complaint   Patient  presents with     Numbness     [facility] NEW CONSULTATION BILATERAL LEG WEAKNESS, RIGHT LEG NUMBNESS     Extremity Weakness           History is obtained from the patient and medical record.      Ayana Prasad is a 28 year old female who has been suffering from right lower extremity numbness for the past 3 weeks.    Approximately 3 weeks ago, she was knocked down to the ground by a dog and fell on her back.  She noticed that her right knee hurts initially, but this improved.  Then her symptoms began; she recalls a nerve pain radiating down both of her legs, affecting her low back as well.  This has also since resolved.    For the last 2-1/2 weeks, she describes bilateral leg numbness that has been progressively worsening.  She describes this as waking up, and not being able to feel her legs.  She tells me that she has chronic left leg numbness since a previous lumbar surgery, and that the left leg is unchanged.  The new symptoms include right leg numbness.  She feels like she cannot stand on it, and cannot support herself.  She also feels like her knees are weak.    In addition to these symptoms, she also has constant low back pain, as well as left shoulder, neck, and chest pain, which has been previously evaluated.  Other symptoms, including significant weight loss of 15 pounds over the past month, dizziness with heart racing are also described.    When asked about her symptoms today, she focuses on fatigue, and she reports this is her most bothersome symptom.  She says that she does not sleep at night, and can spend days awake.    She denies bowel or bladder changes.  She is able to walk short distances and can do some stairs.  Prolonged walking worsens her symptoms, particular her pain and generalized weakness.    She is previously diagnosed with cauda equina syndrome, and underwent surgical intervention and inpatient physical therapy with improvement.  She has been left with left lower extremity numbness as a  result.    She is a history of an L5-S1 laminectomy.  A recent lumbar spine MRI showed mild narrowing of the left lateral recess at L5-S1, although the nerve root was not compressed.  There is also mild to moderate neuroforaminal narrowing bilaterally at L5-S1.            Past Medical History:     Past Medical History:   Diagnosis Date     ADHD (attention deficit hyperactivity disorder)      Bipolar 1 disorder, manic, mild      Cauda equina syndrome      Depressive disorder      GERD (gastroesophageal reflux disease)      Marginal corneal ulcer, left 7/17/2015     Nephrolithiasis      Polysubstance abuse     currently in remission              Past Surgical History:     Past Surgical History:   Procedure Laterality Date     BACK SURGERY - For Cauda Equina Syndrome  12/24/2016     COLONOSCOPY       COMBINED CYSTOSCOPY, INSERT STENT URETER(S) Left 8/30/2018    Procedure: COMBINED CYSTOSCOPY, INSERT STENT URETER(S);  Cystoscopy With Left Stent Placement;  Surgeon: Kiran Ulrich MD;  Location: [facility]     COMBINED CYSTOSCOPY, RETROGRADES, EXCHANGE STENT URETER(S) Left 10/14/2018    Procedure: COMBINED CYSTOSCOPY, RETROGRADES, EXCHANGE STENT URETER(S);  Cystoscopy and removal of left-sided stent.;  Surgeon: Stiven Olivo MD;  Location:  OR     COMBINED CYSTOSCOPY, RETROGRADES, URETEROSCOPY, INSERT STENT  1/6/2014    Procedure: COMBINED CYSTOSCOPY, RETROGRADES, URETEROSCOPY, INSERT STENT;  Cystyoscopy place left ureteral stent;  Surgeon: Jaun Kimble MD;  Location: [facility] OR     COMBINED CYSTOSCOPY, RETROGRADES, URETEROSCOPY, INSERT STENT Left 10/23/2018    Procedure: Video cystoscopy, left ureteroscopy with stone extraction;  Surgeon: Bull Mast MD;  Location:  OR     CYSTOSCOPY, URETEROSCOPY, COMBINED Right 9/23/2015    Procedure: COMBINED CYSTOSCOPY, URETEROSCOPY;  Surgeon: ROME Jett MD;  Location: [facility] OR     ENT SURGERY       ESOPHAGOSCOPY, GASTROSCOPY, DUODENOSCOPY (EGD),  COMBINED  4/8/2013    Procedure: COMBINED ESOPHAGOSCOPY, GASTROSCOPY, DUODENOSCOPY (EGD), BIOPSY SINGLE OR MULTIPLE;  Gastroscopy;  Surgeon: Peter Pickard MD;  Location: [facility]     ESOPHAGOSCOPY, GASTROSCOPY, DUODENOSCOPY (EGD), COMBINED Left 10/28/2014    Procedure: COMBINED ESOPHAGOSCOPY, GASTROSCOPY, DUODENOSCOPY (EGD), BIOPSY SINGLE OR MULTIPLE;  Surgeon: Narcisa Ramirez MD;  Location:  OR     ESOPHAGOSCOPY, GASTROSCOPY, DUODENOSCOPY (EGD), COMBINED N/A 12/24/2018    Procedure: COMBINED ESOPHAGOSCOPY, GASTROSCOPY, DUODENOSCOPY (EGD), BIOPSY SINGLE OR MULTIPLE;  Surgeon: Sonu Verduzco MD;  Location: [facility]     LAPAROSCOPIC CHOLECYSTECTOMY  11/20/2014    [facility], Dr. Ramirez     LASER HOLMIUM LITHOTRIPSY URETER(S), INSERT STENT, COMBINED  4/2/2014    Procedure: COMBINED CYSTOSCOPY, URETEROSCOPY, LASER HOLMIUM LITHOTRIPSY URETER(S), INSERT STENT;  Cystoscopy,Left Ureteral Stent Removal,Left Ureteroscopy with Laser Lithotripsy,Left Ureter Stent Placement;  Surgeon: ROME Jett MD;  Location: [facility] OR     Transurethral stone resection  03/11/2011             Social History:     Social History     Socioeconomic History     Marital status:      Spouse name: Not on file     Number of children: 0     Years of education: Not on file     Highest education level: Not on file   Occupational History     Employer: [employer]   Social Needs     Financial resource strain: Not on file     Food insecurity:     Worry: Not on file     Inability: Not on file     Transportation needs:     Medical: Not on file     Non-medical: Not on file   Tobacco Use     Smoking status: Former Smoker     Packs/day: 0.50     Years: 5.00     Pack years: 2.50     Types: Other     Start date: 8/1/2011     Smokeless tobacco: Current User     Types: Chew     Tobacco comment: Smokes E-cig   Substance and Sexual Activity     Alcohol use: No     Alcohol/week: 0.0 oz     Drug use: No     Types: Marijuana, IV, Codeine      Comment: past use of marijuana, heroin and cocaine     Sexual activity: Yes     Partners: Female     Birth control/protection: None   Lifestyle     Physical activity:     Days per week: Not on file     Minutes per session: Not on file     Stress: Not on file   Relationships     Social connections:     Talks on phone: Not on file     Gets together: Not on file     Attends Yarsani service: Not on file     Active member of club or organization: Not on file     Attends meetings of clubs or organizations: Not on file     Relationship status: Not on file     Intimate partner violence:     Fear of current or ex partner: Not on file     Emotionally abused: Not on file     Physically abused: Not on file     Forced sexual activity: Not on file   Other Topics Concern     Parent/sibling w/ CABG, MI or angioplasty before 65F 55M? No   Social History Narrative    Works at [employer].              Family History:     Family History   Problem Relation Age of Onset     Gastrointestinal Disease Father         Crohn's Disease     Cancer Father         Liver Cancer     Other Cancer Father         Liver     Cancer Maternal Grandmother         lympoma     Diabetes Maternal Grandmother      Mental Illness Maternal Grandmother      Other Cancer Maternal Grandmother         Non Hodgkins Lymphoma     Diabetes Maternal Grandfather      Hypertension Maternal Grandfather      Prostate Cancer Maternal Grandfather      Hyperlipidemia Maternal Grandfather      Heart Disease Paternal Grandfather         heart disease     Hypertension Brother      Other Cancer Brother         Esophegeal cancer     Diabetes Brother      Hyperlipidemia Mother      Mental Illness Mother      Anxiety Disorder Mother      Anesthesia Reaction Mother      Hypertension Brother      Other Cancer Brother      Other Cancer Paternal Half-Brother         Esophageal             Allergies:      Allergies   Allergen Reactions     Adhesive Tape Hives     Percocet  "[Oxycodone-Acetaminophen]      Nausea and vomiting       Prednisone Other (See Comments) and Hives     Suicidal ideation     Droperidol Anxiety             Medications:     Current Outpatient Medications:      metoclopramide (REGLAN) 10 MG tablet, Take 1 tablet (10 mg) by mouth 4 times daily (before meals and nightly) (Patient not taking: Reported on 3/22/2019), Disp: 40 tablet, Rfl: 0     norelgestromin-ethinyl estradiol (ORTHO EVRA) 150-35 MCG/24HR patch, Remove old patch and apply new patch onto the skin once a week for 3 weeks (21 days). Ok to wear consistently (Patient not taking: Reported on 3/22/2019), Disp: 12 patch, Rfl: 3     ondansetron (ZOFRAN) 4 MG tablet, Take 1 tablet (4 mg) by mouth every 6 hours as needed for nausea (Patient not taking: Reported on 3/22/2019), Disp: 21 tablet, Rfl: 3     traMADol (ULTRAM) 50 MG tablet, Take 1 tablet (50 mg) by mouth every 6 hours as needed for severe pain (Patient not taking: Reported on 3/22/2019), Disp: 30 tablet, Rfl: 0     traZODone (DESYREL) 50 MG tablet, Take 1 at night as needed to help with sleep, may repeat in 30 minutes if is ineffective (Patient not taking: Reported on 3/25/2019), Disp: 30 tablet, Rfl: 0          Review of Systems:   Negative, with the exception of dizziness, palpitations, fatigue, weight loss, back pain, knee pain, weakness, numbness, and as noted in the HPI.         Physical Exam:   BP (!) 133/94   Pulse 137   Temp 98  F (36.7  C) (Oral)   Resp 16   Ht 1.694 m (5' 6.7\")   Wt 75.7 kg (166 lb 12.8 oz)   SpO2 97%   BMI 26.36 kg/m      Physical Exam:   General: NAD, lying on her side on the exam table, but able to sit up, stand, and walk when instructed.  Neurologic:   Mental Status Exam: Alert, awake and oriented to situation. No dysarthria. Speech of normal fluency.   Cranial Nerves: Pupils equal, minimally reactive to light, EOMs intact, no nystagmus, facial movements symmetric, facial sensation intact to light touch, hearing " intact to conversation, trapezius and SCMs 5/5 bilaterally, tongue midline and fully mobile. No tongue atrophy or fasciculations.    Motor: Normal tone in all four extremities, no atrophy or fasciculations. 5/5 strength bilaterally in shoulder abduction, elbow extensors and flexors, wrist extensors and flexors, hip flexors, knee extensors and flexors, dorsi- and plantarflexion. No tremors or abnormal movements noted.   Sensory: Sensation subjectively decreased to pinprick and light touch on arms and legs bilaterally.  Vibration intact to 10 seconds at the right toe, absent at the right patella.   Coordination: Finger-nose-finger intact bilaterally.  Rapidly alternating movements intact bilaterally in the upper extremities.  Normal finger tapping bilaterally.  Normal Romberg.   Reflexes: 2+ and symmetric in triceps, biceps, brachioradialis, patellar, absent Achilles on the left, and plantars downgoing bilaterally.   Gait: Normal gait.  Able to toe and heel walk.  Tandem gait with difficulty.  Head: Normocephalic, atraumatic.   Neck: Normal range of motion with lateral head movements and neck flexion.  Eyes: No conjunctival injection, no scleral icterus.   Respiratory: No increased work of breathing.  Cardiovascular: No lower extremity edema.  Extremities: Warm, dry.          Data:     MRI lumbar spine w and wo contrast (3/10/2019):  1. Changes from a laminectomy at L5-S1.  2. No central spinal stenosis. No epidural abscess or hematoma.  3. Bulging disc at L5-S1 causes mild narrowing of the left lateral  recess, though the nerve root does not appear to be compressed.  4. There is mild to moderate neural foraminal narrowing bilaterally at  L5-S1.      Again, thank you for allowing me to participate in the care of your patient.      Sincerely,    Aleshia Tovar MD

## 2019-03-25 NOTE — TELEPHONE ENCOUNTER
Patient called asking if an RN would call her back to go over her results from her blood work done on 3/22/2019.    Faustino [place]

## 2019-03-25 NOTE — RESULT ENCOUNTER NOTE
Ayana,   Your Vit D is on the low end of normal. You need to start taking Vitamin D3 2000 units daily and plan to recheck your Vitamin D level again in 3 months.    Are you taking a Vit V 12 supplement? If so, please stop-you level is elevated.     Potassium is a bit low try to eat high potassium foods like bananas, potatoes and avocados. Should plan to recheck this in 2 weeks. Please call and make a lab only appointment for date and time that works best for you.     Your blood counts are all in normal range  Your electrolytes are all in normal range, except your potassium as mentioned above  Your kidney function is normal  Your liver function is normal  Your thyroid is in normal range  Your glucose (blood sugar) is in normal range for being non-fasting       Please keep your appointments that you already have scheduled.     Becki JENKINS

## 2019-04-03 ENCOUNTER — OFFICE VISIT (OUTPATIENT)
Dept: FAMILY MEDICINE | Facility: CLINIC | Age: 29
End: 2019-04-03
Payer: COMMERCIAL

## 2019-04-03 VITALS
DIASTOLIC BLOOD PRESSURE: 88 MMHG | RESPIRATION RATE: 16 BRPM | SYSTOLIC BLOOD PRESSURE: 122 MMHG | HEART RATE: 108 BPM | TEMPERATURE: 98.8 F

## 2019-04-03 DIAGNOSIS — R46.89 AGGRESSION: ICD-10-CM

## 2019-04-03 DIAGNOSIS — R20.2 NUMBNESS AND TINGLING OF BOTH LEGS: ICD-10-CM

## 2019-04-03 DIAGNOSIS — R79.89 ELEVATED VITAMIN B12 LEVEL: ICD-10-CM

## 2019-04-03 DIAGNOSIS — R20.0 NUMBNESS AND TINGLING OF BOTH LEGS: ICD-10-CM

## 2019-04-03 DIAGNOSIS — G47.9 SLEEP DISTURBANCE: ICD-10-CM

## 2019-04-03 DIAGNOSIS — F41.9 ANXIETY: Primary | ICD-10-CM

## 2019-04-03 PROCEDURE — 99214 OFFICE O/P EST MOD 30 MIN: CPT | Performed by: NURSE PRACTITIONER

## 2019-04-03 RX ORDER — ZOLPIDEM TARTRATE 10 MG/1
10 TABLET ORAL
Status: ON HOLD | COMMUNITY
End: 2019-04-09

## 2019-04-03 ASSESSMENT — ENCOUNTER SYMPTOMS
COUGH: 0
ARTHRALGIAS: 0
SHORTNESS OF BREATH: 0
DIARRHEA: 0
NUMBNESS: 1
NAUSEA: 0
ABDOMINAL PAIN: 0
CONSTIPATION: 0
SLEEP DISTURBANCE: 1
CHEST TIGHTNESS: 0
DYSPHORIC MOOD: 0
RHINORRHEA: 0
AGITATION: 1
MYALGIAS: 0
PALPITATIONS: 0
VOMITING: 0
DIZZINESS: 0
HEADACHES: 0
LIGHT-HEADEDNESS: 0
ABDOMINAL DISTENTION: 0
NERVOUS/ANXIOUS: 1
FATIGUE: 0
SORE THROAT: 0
WHEEZING: 0

## 2019-04-03 ASSESSMENT — PAIN SCALES - GENERAL: PAINLEVEL: MODERATE PAIN (5)
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
## 2019-04-03 NOTE — PATIENT INSTRUCTIONS
Take 1 trazodone at night, if no sleep take 2 trazodone the next night, if no sleep take 3 trazodone the next night, if no sleep take 4 trazodone the next night. No more than 4 trazodone at night.

## 2019-04-04 PROBLEM — Z71.89 ACP (ADVANCE CARE PLANNING): Status: RESOLVED | Noted: 2017-02-03 | Resolved: 2019-04-04

## 2019-04-05 ENCOUNTER — NURSE TRIAGE (OUTPATIENT)
Dept: NURSING | Facility: CLINIC | Age: 29
End: 2019-04-05

## 2019-04-06 NOTE — TELEPHONE ENCOUNTER
"27 y/o female calls because she pouredHydrogen peroxide in both ears for ear aches. Patient \"googled it\" - thought it would help the ear pain, but she also has a hole in her ear drum. Now states it burns, dizzy, nauseous, feels like her head is on fire. RN placed her on hold, and called poison control and they had no protocol for that. RN advised caller to flush with warm water if she can get most of it out, then she noted some hearing loss in that ear. RN advised at this point to go to the local ER to get some help flushing the ear and evaluation.    Ayana Lane RN - [place] Nurse Advisor  4/06/2019  11:45PM    Additional Information    Negative: Knocked out (unconscious) > 1 minute    Negative: Difficult to awaken or acting confused  (e.g., disoriented, slurred speech)    Negative: Severe neck pain    Negative: Sounds like a life-threatening emergency to the triager    Protocols used: EAR INJURY-ADULT-      "

## 2019-04-07 ENCOUNTER — HOSPITAL ENCOUNTER (EMERGENCY)
Facility: CLINIC | Age: 29
Discharge: PSYCHIATRIC OR SUBSTANCE USE FACILITY | End: 2019-04-09
Attending: FAMILY MEDICINE | Admitting: FAMILY MEDICINE
Payer: COMMERCIAL

## 2019-04-07 DIAGNOSIS — H65.93 OME (OTITIS MEDIA WITH EFFUSION), BILATERAL: ICD-10-CM

## 2019-04-07 DIAGNOSIS — R44.0 AUDITORY HALLUCINATIONS: ICD-10-CM

## 2019-04-07 DIAGNOSIS — Z86.59 HISTORY OF IMPULSIVE BEHAVIOR: ICD-10-CM

## 2019-04-07 LAB
ALBUMIN SERPL-MCNC: 3.7 G/DL (ref 3.4–5)
ALP SERPL-CCNC: 58 U/L (ref 40–150)
ALT SERPL W P-5'-P-CCNC: 47 U/L (ref 0–50)
AMPHETAMINES UR QL SCN: NEGATIVE
ANION GAP SERPL CALCULATED.3IONS-SCNC: 8 MMOL/L (ref 3–14)
APAP SERPL-MCNC: <2 MG/L (ref 10–20)
AST SERPL W P-5'-P-CCNC: 15 U/L (ref 0–45)
BARBITURATES UR QL: NEGATIVE
BASOPHILS # BLD AUTO: 0.1 10E9/L (ref 0–0.2)
BASOPHILS NFR BLD AUTO: 0.6 %
BENZODIAZ UR QL: NEGATIVE
BILIRUB SERPL-MCNC: 0.2 MG/DL (ref 0.2–1.3)
BUN SERPL-MCNC: 16 MG/DL (ref 7–30)
CALCIUM SERPL-MCNC: 9.4 MG/DL (ref 8.5–10.1)
CANNABINOIDS UR QL SCN: NEGATIVE
CHLORIDE SERPL-SCNC: 108 MMOL/L (ref 94–109)
CO2 SERPL-SCNC: 23 MMOL/L (ref 20–32)
COCAINE UR QL: NEGATIVE
CREAT SERPL-MCNC: 0.68 MG/DL (ref 0.52–1.04)
DIFFERENTIAL METHOD BLD: ABNORMAL
EOSINOPHIL # BLD AUTO: 0 10E9/L (ref 0–0.7)
EOSINOPHIL NFR BLD AUTO: 0.3 %
ERYTHROCYTE [DISTWIDTH] IN BLOOD BY AUTOMATED COUNT: 14.3 % (ref 10–15)
ETHANOL SERPL-MCNC: <0.01 G/DL
GFR SERPL CREATININE-BSD FRML MDRD: >90 ML/MIN/{1.73_M2}
GLUCOSE SERPL-MCNC: 106 MG/DL (ref 70–99)
HCT VFR BLD AUTO: 42.6 % (ref 35–47)
HGB BLD-MCNC: 14 G/DL (ref 11.7–15.7)
IMM GRANULOCYTES # BLD: 0.1 10E9/L (ref 0–0.4)
IMM GRANULOCYTES NFR BLD: 0.4 %
LYMPHOCYTES # BLD AUTO: 2.6 10E9/L (ref 0.8–5.3)
LYMPHOCYTES NFR BLD AUTO: 18.1 %
MCH RBC QN AUTO: 28.2 PG (ref 26.5–33)
MCHC RBC AUTO-ENTMCNC: 32.9 G/DL (ref 31.5–36.5)
MCV RBC AUTO: 86 FL (ref 78–100)
MONOCYTES # BLD AUTO: 0.6 10E9/L (ref 0–1.3)
MONOCYTES NFR BLD AUTO: 4.5 %
NEUTROPHILS # BLD AUTO: 10.8 10E9/L (ref 1.6–8.3)
NEUTROPHILS NFR BLD AUTO: 76.1 %
NRBC # BLD AUTO: 0 10*3/UL
NRBC BLD AUTO-RTO: 0 /100
OPIATES UR QL SCN: NEGATIVE
PCP UR QL SCN: NEGATIVE
PLATELET # BLD AUTO: 357 10E9/L (ref 150–450)
POTASSIUM SERPL-SCNC: 3.7 MMOL/L (ref 3.4–5.3)
PROT SERPL-MCNC: 7.8 G/DL (ref 6.8–8.8)
RBC # BLD AUTO: 4.96 10E12/L (ref 3.8–5.2)
SALICYLATES SERPL-MCNC: <2 MG/DL
SODIUM SERPL-SCNC: 139 MMOL/L (ref 133–144)
WBC # BLD AUTO: 14.2 10E9/L (ref 4–11)

## 2019-04-07 PROCEDURE — 80329 ANALGESICS NON-OPIOID 1 OR 2: CPT | Performed by: FAMILY MEDICINE

## 2019-04-07 PROCEDURE — 85025 COMPLETE CBC W/AUTO DIFF WBC: CPT | Performed by: FAMILY MEDICINE

## 2019-04-07 PROCEDURE — 90791 PSYCH DIAGNOSTIC EVALUATION: CPT

## 2019-04-07 PROCEDURE — 96376 TX/PRO/DX INJ SAME DRUG ADON: CPT | Performed by: FAMILY MEDICINE

## 2019-04-07 PROCEDURE — 96374 THER/PROPH/DIAG INJ IV PUSH: CPT | Performed by: FAMILY MEDICINE

## 2019-04-07 PROCEDURE — 99285 EMERGENCY DEPT VISIT HI MDM: CPT | Mod: 25 | Performed by: FAMILY MEDICINE

## 2019-04-07 PROCEDURE — 80320 DRUG SCREEN QUANTALCOHOLS: CPT | Mod: 91 | Performed by: FAMILY MEDICINE

## 2019-04-07 PROCEDURE — 80053 COMPREHEN METABOLIC PANEL: CPT | Performed by: FAMILY MEDICINE

## 2019-04-07 PROCEDURE — 80307 DRUG TEST PRSMV CHEM ANLYZR: CPT | Performed by: FAMILY MEDICINE

## 2019-04-07 PROCEDURE — 80329 ANALGESICS NON-OPIOID 1 OR 2: CPT | Mod: 91 | Performed by: FAMILY MEDICINE

## 2019-04-07 PROCEDURE — 25000128 H RX IP 250 OP 636: Performed by: FAMILY MEDICINE

## 2019-04-07 PROCEDURE — 25000132 ZZH RX MED GY IP 250 OP 250 PS 637: Performed by: EMERGENCY MEDICINE

## 2019-04-07 RX ORDER — LORAZEPAM 2 MG/ML
1 INJECTION INTRAMUSCULAR ONCE
Status: COMPLETED | OUTPATIENT
Start: 2019-04-07 | End: 2019-04-07

## 2019-04-07 RX ORDER — ZIPRASIDONE MESYLATE 20 MG/ML
10 INJECTION, POWDER, LYOPHILIZED, FOR SOLUTION INTRAMUSCULAR ONCE
Status: DISCONTINUED | OUTPATIENT
Start: 2019-04-07 | End: 2019-04-07 | Stop reason: RX

## 2019-04-07 RX ORDER — QUETIAPINE FUMARATE 25 MG/1
25 TABLET, FILM COATED ORAL 2 TIMES DAILY
Status: DISCONTINUED | OUTPATIENT
Start: 2019-04-07 | End: 2019-04-07

## 2019-04-07 RX ORDER — ZIPRASIDONE HYDROCHLORIDE 20 MG/1
20 CAPSULE ORAL ONCE
Status: COMPLETED | OUTPATIENT
Start: 2019-04-07 | End: 2019-04-07

## 2019-04-07 RX ORDER — ZIPRASIDONE HYDROCHLORIDE 20 MG/1
20 CAPSULE ORAL ONCE
Status: DISCONTINUED | OUTPATIENT
Start: 2019-04-07 | End: 2019-04-07

## 2019-04-07 RX ADMIN — LORAZEPAM 1 MG: 2 INJECTION INTRAMUSCULAR; INTRAVENOUS at 19:09

## 2019-04-07 RX ADMIN — LORAZEPAM 1 MG: 2 INJECTION INTRAMUSCULAR; INTRAVENOUS at 18:19

## 2019-04-07 RX ADMIN — LORAZEPAM 1 MG: 2 INJECTION INTRAMUSCULAR; INTRAVENOUS at 17:51

## 2019-04-07 RX ADMIN — LORAZEPAM 1 MG: 2 INJECTION INTRAMUSCULAR; INTRAVENOUS at 22:19

## 2019-04-07 RX ADMIN — ZIPRASIDONE HYDROCHLORIDE 20 MG: 20 CAPSULE ORAL at 23:48

## 2019-04-07 NOTE — ED NOTES
Pt moved to room  5 for with be admitted for MH decompensation. Security called to watch patient. lab and iv started, pt given ativan as ordered. Pt restless but remains cooperative. Spouse in room

## 2019-04-07 NOTE — ED PROVIDER NOTES
"HPI  Current medications, past medical history, and social history are reviewed.    The patient is a 28-year-old female presenting with bilateral ear discomfort.  Symptoms present for 2 days.  She describes dull hearing on both sides.  She denies that one side is worse than the other.  No drainage from the ears.  No fever or recent URI symptoms.  No allergy symptoms.  No trauma or injury.    In addition, the patient describes auditory hallucinations.  This is not a new phenomenon for her.  She has had this problem for years.  She has a known history of mental health diagnoses including bipolar and depression.  She has been admitted previously for attempted suicide.  This occurred years ago.  She describes progressively worsened auditory hallucinations.  Sometimes, they tell her to hurt herself.  For example, yesterday they told her to run into the wall and break her neck.  She proceeded to run into the wall \"because voices were just out of control.\"  On the other hand, the patient describes erratic/impulsive behavior such as jumping out of a moving car.  This occurred last week when she was having an argument with her partner.  The patient's car was in the middle [place] and she \"hoped to be hit by another car.\"  She denies current suicidality.  She denies current homicidality.  She does not have access to weapons.    ROS: All other review of systems are negative other than that noted above.      Past Medical History:   Diagnosis Date     ADHD (attention deficit hyperactivity disorder)      Bipolar 1 disorder, manic, mild      Cauda equina syndrome      Depressive disorder      GERD (gastroesophageal reflux disease)      Marginal corneal ulcer, left 7/17/2015     Nephrolithiasis      Polysubstance abuse     currently in remission     Past Surgical History:   Procedure Laterality Date     BACK SURGERY - For Cauda Equina Syndrome  12/24/2016     COLONOSCOPY       COMBINED CYSTOSCOPY, INSERT STENT URETER(S) Left 8/30/2018 "    Procedure: COMBINED CYSTOSCOPY, INSERT STENT URETER(S);  Cystoscopy With Left Stent Placement;  Surgeon: Kiran Ulrich MD;  Location: [facility]     COMBINED CYSTOSCOPY, RETROGRADES, EXCHANGE STENT URETER(S) Left 10/14/2018    Procedure: COMBINED CYSTOSCOPY, RETROGRADES, EXCHANGE STENT URETER(S);  Cystoscopy and removal of left-sided stent.;  Surgeon: Stiven Olivo MD;  Location:  OR     COMBINED CYSTOSCOPY, RETROGRADES, URETEROSCOPY, INSERT STENT  1/6/2014    Procedure: COMBINED CYSTOSCOPY, RETROGRADES, URETEROSCOPY, INSERT STENT;  Cystyoscopy place left ureteral stent;  Surgeon: Juan Kimble MD;  Location: [facility]     COMBINED CYSTOSCOPY, RETROGRADES, URETEROSCOPY, INSERT STENT Left 10/23/2018    Procedure: Video cystoscopy, left ureteroscopy with stone extraction;  Surgeon: Bull Mast MD;  Location:  OR     CYSTOSCOPY, URETEROSCOPY, COMBINED Right 9/23/2015    Procedure: COMBINED CYSTOSCOPY, URETEROSCOPY;  Surgeon: ROME Jett MD;  Location: [facility]     ENT SURGERY       ESOPHAGOSCOPY, GASTROSCOPY, DUODENOSCOPY (EGD), COMBINED  4/8/2013    Procedure: COMBINED ESOPHAGOSCOPY, GASTROSCOPY, DUODENOSCOPY (EGD), BIOPSY SINGLE OR MULTIPLE;  Gastroscopy;  Surgeon: Peter Pickard MD;  Location: [facility]     ESOPHAGOSCOPY, GASTROSCOPY, DUODENOSCOPY (EGD), COMBINED Left 10/28/2014    Procedure: COMBINED ESOPHAGOSCOPY, GASTROSCOPY, DUODENOSCOPY (EGD), BIOPSY SINGLE OR MULTIPLE;  Surgeon: Narcisa Ramirez MD;  Location:  OR     ESOPHAGOSCOPY, GASTROSCOPY, DUODENOSCOPY (EGD), COMBINED N/A 12/24/2018    Procedure: COMBINED ESOPHAGOSCOPY, GASTROSCOPY, DUODENOSCOPY (EGD), BIOPSY SINGLE OR MULTIPLE;  Surgeon: Sonu Verduzco MD;  Location: [facility]     LAPAROSCOPIC CHOLECYSTECTOMY  11/20/2014    [facility], Dr. Ramirez     LASER HOLMIUM LITHOTRIPSY URETER(S), INSERT STENT, COMBINED  4/2/2014    Procedure: COMBINED CYSTOSCOPY, URETEROSCOPY, LASER HOLMIUM LITHOTRIPSY URETER(S),  INSERT STENT;  Cystoscopy,Left Ureteral Stent Removal,Left Ureteroscopy with Laser Lithotripsy,Left Ureter Stent Placement;  Surgeon: ROME Jett MD;  Location: [facility]     Transurethral stone resection  03/11/2011     Medicines    No current outpatient medications on file.  Family History   Problem Relation Age of Onset     Gastrointestinal Disease Father         Crohn's Disease     Cancer Father         Liver Cancer     Other Cancer Father         Liver     Cancer Maternal Grandmother         lympoma     Diabetes Maternal Grandmother      Mental Illness Maternal Grandmother      Other Cancer Maternal Grandmother         Non Hodgkins Lymphoma     Diabetes Maternal Grandfather      Hypertension Maternal Grandfather      Prostate Cancer Maternal Grandfather      Hyperlipidemia Maternal Grandfather      Heart Disease Paternal Grandfather         heart disease     Hypertension Brother      Other Cancer Brother         Esophegeal cancer     Diabetes Brother      Hyperlipidemia Mother      Mental Illness Mother      Anxiety Disorder Mother      Anesthesia Reaction Mother      Hypertension Brother      Other Cancer Brother      Other Cancer Paternal Half-Brother         Esophageal     Social History     Tobacco Use     Smoking status: Former Smoker     Packs/day: 0.50     Years: 5.00     Pack years: 2.50     Types: Other     Start date: 8/1/2011     Smokeless tobacco: Current User     Types: Chew     Tobacco comment: Smokes E-cig   Substance Use Topics     Alcohol use: No     Alcohol/week: 0.0 oz     Drug use: No     Types: Marijuana, IV, Codeine     Comment: past use of marijuana, heroin and cocaine         PHYSICAL  /66   Pulse 92   Temp 97.7  F (36.5  C) (Oral)   Resp 18   Wt 75.3 kg (166 lb)   SpO2 98%   BMI 26.23 kg/m    General: Patient is alert and in moderate to severe distress.  Flat affect.  She does not communicate well and will keep her earphones on during most of her conversation.  When asked,  she does remove them and refuses to look at me.  Neurological: Alert.  Moving upper and lower extremities equally, bilaterally.  Head / Neck: Atraumatic.  Ears: Tympanic membrane on the right is slightly full but no injection and no purulence behind.  No drainage.  TM on the left is unremarkable.  No injection and no purulence behind.  Eyes: Pupils are equal, round, and reactive.  Normal conjunctiva.  Nose: Midline.  No epistaxis.  Mouth / Throat:  Moist. Respiratory: No respiratory distress.  Cardiovascular: Regular rhythm.  Peripheral extremities are warm.  Abdomen / Pelvis: Not done.  Genitalia: Not done.  Musculoskeletal: Not tender.  Skin: No evidence of rash or trauma.        PHYSICIAN  1644.  I think she has otitis media with effusion which is causing some of her ear symptoms.  However, she also has worsened auditory hallucinations recently and has obvious impulsive behavior.  She denies suicidal ideation or homicidal ideation but at the same time has done some things recently that are concerning.  Also, these voices in her head are telling her to harm herself in a serious way and she is following their instruction.  Apparently, she has difficulty accessing medical care because of insurance.  I would like DEC to be involved in order to assess risk and to possibly coordinate outpatient management.  The patient is not currently on hold.    Labs Ordered and Resulted from Time of ED Arrival Up to the Time of Departure from the ED   CBC WITH PLATELETS DIFFERENTIAL - Abnormal; Notable for the following components:       Result Value    WBC 14.2 (*)     Absolute Neutrophil 10.8 (*)     All other components within normal limits   COMPREHENSIVE METABOLIC PANEL - Abnormal; Notable for the following components:    Glucose 106 (*)     All other components within normal limits   DRUG ABUSE SCREEN 77 URINE (FL, RH, SH)   ALCOHOL ETHYL   ACETAMINOPHEN LEVEL   SALICYLATE LEVEL   MAY SALINE LOCK IV       6486.  The patient  continued to express concern for her safety to the DEC .  The patient's partner also communicated that the patient has told her multiple times recently that she was going to kill herself.  It is deemed necessary that the patient be hospitalized for further medical care.  Lab values pending.    IMPRESSION    ICD-10-CM    1. OME (otitis media with effusion), bilateral H65.93 CBC with platelets differential     Comprehensive metabolic panel     Drug abuse screen 77 urine (FL, RH, SH)     Alcohol ethyl     Acetaminophen level     Salicylate level   2. Auditory hallucinations R44.0    3. History of impulsive behavior Z86.59             Liu Baig MD  04/10/19 0613

## 2019-04-07 NOTE — DISCHARGE INSTRUCTIONS
Return to the Emergency Room if the following occurs:     Severely worsened pain, fever >101, or for any concern at anytime.    Or, follow-up with the following provider as we discussed:     Return to the ENT clinic if not improved over the next 2-3 weeks.    Medications discussed:    Ibuprofen 600 mg every six hours for pain (7 days duration).  Tylenol 1000 mg every six hours for pain (7 days duration).  Therefore, you can alternate these every three hours and do it safely.    If you received pain-relieving or sedating medication during your time in the ER, avoid alcohol, driving automobiles, or working with machinery.  Also, a responsible adult must stay with you.        Call the Nurse Advice Line at [phone] or [phone] for any concern at anytime.

## 2019-04-07 NOTE — ED TRIAGE NOTES
Pt sometimes has issues with balance, fall risk band applied. Pt has upcoming appts to deal with that. Pt HR is 141, says she has hx of anxiety, HR is regular, pt says she feels fine physically except for ear pain. Pt  has hx of ear tubes within the last few years. Pt here with her wife, pt says she needs to go to ER because of restricted MA.

## 2019-04-08 ENCOUNTER — APPOINTMENT (OUTPATIENT)
Dept: GENERAL RADIOLOGY | Facility: CLINIC | Age: 29
End: 2019-04-08
Attending: EMERGENCY MEDICINE
Payer: COMMERCIAL

## 2019-04-08 ENCOUNTER — TELEPHONE (OUTPATIENT)
Dept: BEHAVIORAL HEALTH | Facility: CLINIC | Age: 29
End: 2019-04-08
Payer: COMMERCIAL

## 2019-04-08 PROCEDURE — 25000132 ZZH RX MED GY IP 250 OP 250 PS 637: Performed by: EMERGENCY MEDICINE

## 2019-04-08 PROCEDURE — 73130 X-RAY EXAM OF HAND: CPT | Mod: RT

## 2019-04-08 PROCEDURE — 25000131 ZZH RX MED GY IP 250 OP 636 PS 637: Performed by: EMERGENCY MEDICINE

## 2019-04-08 RX ORDER — ONDANSETRON 4 MG/1
4 TABLET, ORALLY DISINTEGRATING ORAL ONCE
Status: COMPLETED | OUTPATIENT
Start: 2019-04-08 | End: 2019-04-08

## 2019-04-08 RX ORDER — LORAZEPAM 0.5 MG/1
0.5 TABLET ORAL ONCE
Status: COMPLETED | OUTPATIENT
Start: 2019-04-08 | End: 2019-04-08

## 2019-04-08 RX ORDER — LORAZEPAM 1 MG/1
1 TABLET ORAL
Status: COMPLETED | OUTPATIENT
Start: 2019-04-08 | End: 2019-04-08

## 2019-04-08 RX ORDER — ACETAMINOPHEN 500 MG
1000 TABLET ORAL ONCE
Status: COMPLETED | OUTPATIENT
Start: 2019-04-08 | End: 2019-04-08

## 2019-04-08 RX ADMIN — ACETAMINOPHEN 1000 MG: 500 TABLET, FILM COATED ORAL at 15:22

## 2019-04-08 RX ADMIN — LORAZEPAM 0.5 MG: 0.5 TABLET ORAL at 14:49

## 2019-04-08 RX ADMIN — LORAZEPAM 1 MG: 1 TABLET ORAL at 23:16

## 2019-04-08 RX ADMIN — ONDANSETRON 4 MG: 4 TABLET, ORALLY DISINTEGRATING ORAL at 20:26

## 2019-04-08 RX ADMIN — ONDANSETRON 4 MG: 4 TABLET, ORALLY DISINTEGRATING ORAL at 00:34

## 2019-04-08 NOTE — ED PROVIDER NOTES
Emergency Department Psychiatric Patient Sign-out       Brief HPI:  This is a 28 year old female signed out to me by Dr. Baig .  See initial ED Provider note for details of the presentation.     Patient is medically cleared for admission to a Behavioral Health unit.      Pending studies include NA.      The patient is on a hold.  The type of hold is transport.      The patient has not required medication for agitation.    Medications   ziprasidone (GEODON) injection 10 mg (has no administration in time range)   LORazepam (ATIVAN) injection 1 mg (1 mg Intravenous Given 4/7/19 1751)   LORazepam (ATIVAN) injection 1 mg (1 mg Intravenous Given 4/7/19 1819)   LORazepam (ATIVAN) injection 1 mg (1 mg Intravenous Given 4/7/19 1909)   LORazepam (ATIVAN) injection 1 mg (1 mg Intravenous Given 4/7/19 2219)       Exam:   Patient Vitals for the past 24 hrs:   BP Temp Temp src Heart Rate Resp SpO2 Weight   04/07/19 1643 -- -- -- -- -- -- 75.3 kg (166 lb)   04/07/19 1621 -- -- -- -- -- 95 % --   04/07/19 1620 -- -- -- -- -- 95 % --   04/07/19 1619 -- -- -- -- -- 95 % --   04/07/19 1618 -- -- -- -- -- 95 % --   04/07/19 1616 -- -- -- -- -- 95 % --   04/07/19 1615 -- -- -- -- -- 95 % --   04/07/19 1614 -- -- -- -- -- 95 % --   04/07/19 1613 -- -- -- -- -- 96 % --   04/07/19 1612 -- -- -- -- -- 95 % --   04/07/19 1611 -- -- -- -- -- 97 % --   04/07/19 1610 -- -- -- -- -- 97 % --   04/07/19 1608 -- -- -- -- -- 97 % --   04/07/19 1538 135/82 98.4  F (36.9  C) Temporal 141 20 -- --         ED Course:    Patient requested medication to help her relax and sleep and was given oral ziprasidone.    There were no significant events while under my care.      Patient was signed out to the oncoming provider. Dr. Smith      Impression:    ICD-10-CM    1. OME (otitis media with effusion), bilateral H65.93 CBC with platelets differential     Comprehensive metabolic panel     Drug abuse screen 77 urine (FL, RH, SH)     Alcohol ethyl      Acetaminophen level     Salicylate level       Plan:    1. Await Transfer to [facility]      RESULTS:   Results for orders placed or performed during the hospital encounter of 04/07/19 (from the past 24 hour(s))   CBC with platelets differential     Status: Abnormal    Collection Time: 04/07/19  5:44 PM   Result Value Ref Range    WBC 14.2 (H) 4.0 - 11.0 10e9/L    RBC Count 4.96 3.8 - 5.2 10e12/L    Hemoglobin 14.0 11.7 - 15.7 g/dL    Hematocrit 42.6 35.0 - 47.0 %    MCV 86 78 - 100 fl    MCH 28.2 26.5 - 33.0 pg    MCHC 32.9 31.5 - 36.5 g/dL    RDW 14.3 10.0 - 15.0 %    Platelet Count 357 150 - 450 10e9/L    Diff Method Automated Method     % Neutrophils 76.1 %    % Lymphocytes 18.1 %    % Monocytes 4.5 %    % Eosinophils 0.3 %    % Basophils 0.6 %    % Immature Granulocytes 0.4 %    Nucleated RBCs 0 0 /100    Absolute Neutrophil 10.8 (H) 1.6 - 8.3 10e9/L    Absolute Lymphocytes 2.6 0.8 - 5.3 10e9/L    Absolute Monocytes 0.6 0.0 - 1.3 10e9/L    Absolute Eosinophils 0.0 0.0 - 0.7 10e9/L    Absolute Basophils 0.1 0.0 - 0.2 10e9/L    Abs Immature Granulocytes 0.1 0 - 0.4 10e9/L    Absolute Nucleated RBC 0.0    Comprehensive metabolic panel     Status: Abnormal    Collection Time: 04/07/19  5:44 PM   Result Value Ref Range    Sodium 139 133 - 144 mmol/L    Potassium 3.7 3.4 - 5.3 mmol/L    Chloride 108 94 - 109 mmol/L    Carbon Dioxide 23 20 - 32 mmol/L    Anion Gap 8 3 - 14 mmol/L    Glucose 106 (H) 70 - 99 mg/dL    Urea Nitrogen 16 7 - 30 mg/dL    Creatinine 0.68 0.52 - 1.04 mg/dL    GFR Estimate >90 >60 mL/min/[1.73_m2]    GFR Estimate If Black >90 >60 mL/min/[1.73_m2]    Calcium 9.4 8.5 - 10.1 mg/dL    Bilirubin Total 0.2 0.2 - 1.3 mg/dL    Albumin 3.7 3.4 - 5.0 g/dL    Protein Total 7.8 6.8 - 8.8 g/dL    Alkaline Phosphatase 58 40 - 150 U/L    ALT 47 0 - 50 U/L    AST 15 0 - 45 U/L   Alcohol ethyl     Status: None    Collection Time: 04/07/19  5:44 PM   Result Value Ref Range    Ethanol g/dL <0.01 <0.01  g/dL   Acetaminophen level     Status: None    Collection Time: 04/07/19  5:44 PM   Result Value Ref Range    Acetaminophen Level <2 mg/L   Salicylate level     Status: None    Collection Time: 04/07/19  5:44 PM   Result Value Ref Range    Salicylate Level <2 mg/dL   Drug abuse screen 77 urine (FL, RH, SH)     Status: None    Collection Time: 04/07/19  7:06 PM   Result Value Ref Range    Amphetamine Qual Urine Negative NEG^Negative    Barbiturates Qual Urine Negative NEG^Negative    Benzodiazepine Qual Urine Negative NEG^Negative    Cannabinoids Qual Urine Negative NEG^Negative    Cocaine Qual Urine Negative NEG^Negative    Opiates Qualitative Urine Negative NEG^Negative    PCP Qual Urine Negative NEG^Negative         Jackson Calle MD  04/08/19 0538

## 2019-04-08 NOTE — ED NOTES
Pt getting restless in room.   Sitting on chair, lying on floor.    Pt requesting something for anxiety.  Provider notified.

## 2019-04-08 NOTE — ED NOTES
[facility] called back and will call us back around 1400 regarding pt placement.   [facility] reports may not have enough staff to accept pt today.

## 2019-04-08 NOTE — TELEPHONE ENCOUNTER
R: [facility] [address] Sebastian. Unit notified of pending admission. ED RN given disposition.

## 2019-04-08 NOTE — ED NOTES
Called [facility] to find out about bed placement for pt.   [facility] informed writer that unable to accept pt and that Writer needs to call intake and report this to find another bed for pt.

## 2019-04-08 NOTE — ED NOTES
Pt very anxious about having to spend the night, requesting Ketamine to get through the night otherwise feels she will start banging her head and hands on the wall. Pt cooperative with RN at this time. Allergies updated at this time.

## 2019-04-08 NOTE — ED NOTES
[facility] intake called and stated patient has a bed at [facility] but currently could be as late as tomorrow before she can be transferred do to staff issues

## 2019-04-08 NOTE — ED NOTES
Pt walking around in circles in room, remains cooperative with staff.   IV requested to be removed, provider indicated IV can be discontinued.   Removed IV.   Pt remains pleasant and updated on plan of care.   Watch remains in place.

## 2019-04-08 NOTE — ED NOTES
Pt restless, pacing and wanting to leave. Requesting to talk to MD so she can leave, MD notified and speaking to patient at this time.

## 2019-04-08 NOTE — ED NOTES
Patient told this writer that she doesn't feel suicidal but the voices in her head continue to tell her to commit suicide.  Patient calm, cooperative.  Denies any needs currently.  Patient watch continue  Intake notified of what patient stating. Patient states refuses to go to [facility] for treatment.

## 2019-04-08 NOTE — ED NOTES
Pt appears to be sleeping, respirations even and unlabored no signs or symptoms of distress noted.

## 2019-04-09 ENCOUNTER — HOSPITAL ENCOUNTER (INPATIENT)
Facility: CLINIC | Age: 29
LOS: 6 days | Discharge: HOME | End: 2019-04-15
Attending: PSYCHIATRY & NEUROLOGY | Admitting: PSYCHIATRY & NEUROLOGY
Payer: COMMERCIAL

## 2019-04-09 VITALS
DIASTOLIC BLOOD PRESSURE: 66 MMHG | HEART RATE: 92 BPM | WEIGHT: 166 LBS | BODY MASS INDEX: 26.23 KG/M2 | OXYGEN SATURATION: 98 % | RESPIRATION RATE: 18 BRPM | SYSTOLIC BLOOD PRESSURE: 105 MMHG | TEMPERATURE: 97.7 F

## 2019-04-09 DIAGNOSIS — M67.40 GANGLION CYST: ICD-10-CM

## 2019-04-09 DIAGNOSIS — F31.11 BIPOLAR 1 DISORDER, MANIC, MILD (H): Primary | ICD-10-CM

## 2019-04-09 PROBLEM — R45.851 SUICIDAL IDEATION: Status: ACTIVE | Noted: 2019-04-09

## 2019-04-09 PROCEDURE — 25000132 ZZH RX MED GY IP 250 OP 250 PS 637: Performed by: EMERGENCY MEDICINE

## 2019-04-09 PROCEDURE — 99223 1ST HOSP IP/OBS HIGH 75: CPT | Mod: AI | Performed by: PSYCHIATRY & NEUROLOGY

## 2019-04-09 PROCEDURE — 12400001 ZZH R&B MH UMMC

## 2019-04-09 PROCEDURE — 25000132 ZZH RX MED GY IP 250 OP 250 PS 637: Performed by: PSYCHIATRY & NEUROLOGY

## 2019-04-09 RX ORDER — ZIPRASIDONE HYDROCHLORIDE 20 MG/1
20 CAPSULE ORAL
Status: COMPLETED | OUTPATIENT
Start: 2019-04-09 | End: 2019-04-09

## 2019-04-09 RX ORDER — ACETAMINOPHEN 325 MG/1
650 TABLET ORAL EVERY 4 HOURS PRN
Status: DISCONTINUED | OUTPATIENT
Start: 2019-04-09 | End: 2019-04-15 | Stop reason: HOSPADM

## 2019-04-09 RX ORDER — ESZOPICLONE 1 MG/1
1 TABLET, FILM COATED ORAL AT BEDTIME
Status: DISCONTINUED | OUTPATIENT
Start: 2019-04-09 | End: 2019-04-15

## 2019-04-09 RX ORDER — TRAZODONE HYDROCHLORIDE 50 MG/1
50 TABLET, FILM COATED ORAL
Status: DISCONTINUED | OUTPATIENT
Start: 2019-04-09 | End: 2019-04-15 | Stop reason: HOSPADM

## 2019-04-09 RX ORDER — LORAZEPAM 1 MG/1
1 TABLET ORAL ONCE
Status: COMPLETED | OUTPATIENT
Start: 2019-04-09 | End: 2019-04-09

## 2019-04-09 RX ORDER — ALUMINA, MAGNESIA, AND SIMETHICONE 2400; 2400; 240 MG/30ML; MG/30ML; MG/30ML
30 SUSPENSION ORAL EVERY 4 HOURS PRN
Status: DISCONTINUED | OUTPATIENT
Start: 2019-04-09 | End: 2019-04-15 | Stop reason: HOSPADM

## 2019-04-09 RX ORDER — HYDROXYZINE HYDROCHLORIDE 25 MG/1
25-50 TABLET, FILM COATED ORAL EVERY 4 HOURS PRN
Status: DISCONTINUED | OUTPATIENT
Start: 2019-04-09 | End: 2019-04-15 | Stop reason: HOSPADM

## 2019-04-09 RX ORDER — BISACODYL 10 MG
10 SUPPOSITORY, RECTAL RECTAL DAILY PRN
Status: DISCONTINUED | OUTPATIENT
Start: 2019-04-09 | End: 2019-04-15 | Stop reason: HOSPADM

## 2019-04-09 RX ORDER — LORAZEPAM 1 MG/1
1 TABLET ORAL 3 TIMES DAILY PRN
Status: DISCONTINUED | OUTPATIENT
Start: 2019-04-09 | End: 2019-04-12

## 2019-04-09 RX ORDER — ZOLPIDEM TARTRATE 5 MG/1
10 TABLET ORAL
Status: DISCONTINUED | OUTPATIENT
Start: 2019-04-09 | End: 2019-04-09

## 2019-04-09 RX ORDER — NORELGESTROMIN AND ETHINYL ESTRADIOL 35; 150 UG/MG; UG/MG
1 PATCH TRANSDERMAL WEEKLY
Status: DISCONTINUED | OUTPATIENT
Start: 2019-04-11 | End: 2019-04-15 | Stop reason: HOSPADM

## 2019-04-09 RX ORDER — LORAZEPAM 0.5 MG/1
0.5 TABLET ORAL ONCE
Status: COMPLETED | OUTPATIENT
Start: 2019-04-09 | End: 2019-04-09

## 2019-04-09 RX ORDER — TRAZODONE HYDROCHLORIDE 50 MG/1
50 TABLET, FILM COATED ORAL
COMMUNITY
End: 2019-05-31

## 2019-04-09 RX ORDER — LORAZEPAM 1 MG/1
2 TABLET ORAL
Status: COMPLETED | OUTPATIENT
Start: 2019-04-09 | End: 2019-04-09

## 2019-04-09 RX ORDER — ONDANSETRON 4 MG/1
4 TABLET, FILM COATED ORAL EVERY 6 HOURS PRN
Status: DISCONTINUED | OUTPATIENT
Start: 2019-04-09 | End: 2019-04-15 | Stop reason: HOSPADM

## 2019-04-09 RX ORDER — ZIPRASIDONE HYDROCHLORIDE 20 MG/1
20 CAPSULE ORAL 2 TIMES DAILY WITH MEALS
Status: DISCONTINUED | OUTPATIENT
Start: 2019-04-09 | End: 2019-04-10

## 2019-04-09 RX ADMIN — ZIPRASIDONE HYDROCHLORIDE 20 MG: 20 CAPSULE ORAL at 04:49

## 2019-04-09 RX ADMIN — LORAZEPAM 1 MG: 1 TABLET ORAL at 04:49

## 2019-04-09 RX ADMIN — LORAZEPAM 1 MG: 1 TABLET ORAL at 19:21

## 2019-04-09 RX ADMIN — LORAZEPAM 1 MG: 1 TABLET ORAL at 04:50

## 2019-04-09 RX ADMIN — ZIPRASIDONE HCL 20 MG: 20 CAPSULE ORAL at 19:17

## 2019-04-09 RX ADMIN — ESZOPICLONE 1 MG: 1 TABLET, FILM COATED ORAL at 21:41

## 2019-04-09 RX ADMIN — NICOTINE 1 PATCH: 7 PATCH TRANSDERMAL at 19:17

## 2019-04-09 RX ADMIN — LORAZEPAM 0.5 MG: 0.5 TABLET ORAL at 11:09

## 2019-04-09 ASSESSMENT — ACTIVITIES OF DAILY LIVING (ADL)
AMBULATION: 0-->INDEPENDENT
RETIRED_EATING: 0-->INDEPENDENT
COGNITION: 0 - NO COGNITION ISSUES REPORTED
DRESS: 0-->INDEPENDENT
FALL_HISTORY_WITHIN_LAST_SIX_MONTHS: YES
SWALLOWING: 0-->SWALLOWS FOODS/LIQUIDS WITHOUT DIFFICULTY
NUMBER_OF_TIMES_PATIENT_HAS_FALLEN_WITHIN_LAST_SIX_MONTHS: 100
TRANSFERRING: 0-->INDEPENDENT
BATHING: 0-->INDEPENDENT
RETIRED_COMMUNICATION: 0-->UNDERSTANDS/COMMUNICATES WITHOUT DIFFICULTY
TOILETING: 0-->INDEPENDENT

## 2019-04-09 ASSESSMENT — MIFFLIN-ST. JEOR: SCORE: 1511.07

## 2019-04-09 NOTE — ED PROVIDER NOTES
Emergency Department Psychiatric Patient Sign-out       Brief HPI:  This is a 28 year old female whom I assumed care at 10.45pm after I was approached by primary nurse Sherley Dobson RN about a request for medication to help patient relax and imaging of the hand after self-inflicted injury to the hand after patient hit her hand during an episode when she was agitated while awaiting transfer for further care and evaluation.  There was no signout at the time of my assuming care at 10:45 PM..  See initial ED Provider note by Zain Baig, [place], for details of the presentation prior to my assuming care. Patient was signed out to Dr Smith at shift change by Dr MARY Bee prior to my assuming care. I also reviewed DEC consult note by Teddy Thompson (See medical record for additional details).  Per report from nursing patient is medically cleared for admission to a Behavioral Health unit.     Pending studies include  XR imaging-obtained after I assumed care..      The patient is on a hold.  The type of hold is a transport hold    The patient has required medication for agitation.    Medications   ziprasidone (GEODON) capsule 20 mg (has no administration in time range)   LORazepam (ATIVAN) tablet 2 mg (has no administration in time range)   LORazepam (ATIVAN) injection 1 mg (1 mg Intravenous Given 4/7/19 1751)   LORazepam (ATIVAN) injection 1 mg (1 mg Intravenous Given 4/7/19 1819)   LORazepam (ATIVAN) injection 1 mg (1 mg Intravenous Given 4/7/19 1909)   LORazepam (ATIVAN) injection 1 mg (1 mg Intravenous Given 4/7/19 2219)   ziprasidone (GEODON) capsule 20 mg (20 mg Oral Given 4/7/19 2348)   ondansetron (ZOFRAN-ODT) ODT tab 4 mg (4 mg Oral Given 4/8/19 0034)   LORazepam (ATIVAN) tablet 0.5 mg (0.5 mg Oral Given 4/8/19 1449)   acetaminophen (TYLENOL) tablet 1,000 mg (1,000 mg Oral Given 4/8/19 1522)   ondansetron (ZOFRAN-ODT) ODT tab 4 mg (4 mg Oral Given 4/8/19 2026)   LORazepam (ATIVAN) tablet 1 mg (1 mg Oral Given 4/8/19  5984)       Exam:   Patient Vitals for the past 24 hrs:   BP Temp Temp src Pulse Resp SpO2   04/08/19 2319 -- 98.7  F (37.1  C) Oral -- -- 98 %   04/08/19 2318 (!) 132/93 -- -- 92 -- --   04/08/19 2104 -- 99.2  F (37.3  C) Temporal -- -- --   04/08/19 2100 122/83 -- -- 76 -- 97 %   04/08/19 1421 116/83 98.5  F (36.9  C) Oral 85 18 95 %         ED Course:  Significant events under my care included: X-ray imaging of the hand after report from primary nurse that patient had hand swelling and pain concern for fracture after self-inflicted injury with patient hitting her head against the wall.  X-ray images were reviewed independently and did not show any acute bony fracture due to blunt trauma.  Please see the interpreting radiologist report above.  Patient was also given a dose of oral lorazepam to help for anxiety and agitation.  Patient's medical record lists multiple medication allergies including droperidol and Haldol causing anxiety and agitation.   Seroquel causing prolonged hospitalization for QT prolongation and an unknown reaction to risperidone.  Patient received a dose of lorazepam and at 4:30 AM she was beginning to become agitated and restless and pacing and a repeat dose of Geodon was provided as initially prescribed on April 7, 2019 with patient's medication allergy profile.  I reviewed medical documentation by Ashley Araya, Rohit and Sarah.    Per Dr Araya ED note-concern for impulsive behavior and increasing auditory hallucinations, DEC consult ordered. I reviewed DEC consult note by Teddy MCGOVERN (see medical records for additional detals).        Patient was signed out to the oncoming provider. Dr. ANDREW Whitaker at shift change at 7AM awaiting transfer for further care and evaluation.  Report from nursing is that the bed will be available at [facility] at around 8 AM      Impression:    ICD-10-CM    1. OME (otitis media with effusion), bilateral H65.93 CBC with platelets differential      Comprehensive metabolic panel     Drug abuse screen 77 urine (FL, RH, SH)     Alcohol ethyl     Acetaminophen level     Salicylate level   2. Auditory hallucinations R44.0    3. History of impulsive behavior Z86.59        Plan:    1. Await Transfer to Mental Health Facility      RESULTS:   Results for orders placed or performed during the hospital encounter of 04/07/19 (from the past 24 hour(s))   XR Hand Right G/E 3 Views     Status: None    Collection Time: 04/08/19 11:06 PM    Narrative    RIGHT HAND 3 VIEWS  4/8/2019 11:06 PM     HISTORY: Self-inflicted right hand injury. Hit hand against the wall.  Swelling and bruising about the metacarpals.    COMPARISON: 6/9/2017.      Impression    IMPRESSION: No convincing acute fracture or malalignment of the right  hand.    MD Andrew LUCIO Ebenezer Tope, MD  04/09/19 0797

## 2019-04-09 NOTE — ED NOTES
"Pt is requesting ativan for sleep. Pt has been sitting outside of her room talking with the sitter throughout the night. Pt has been ambulating throughout the department. Pt states that she has been having problems with sleep for a few weeks.  Pt has not slept more than 1 to 2 hours per night.  Pt does admit to taking 6-7 ambien prior to coming in with no help.  Pt has had ativan during stay with no help in sleep.  Pt is requesting ativan at this time.  Discussed with primary RN.  Encouraged patient to lay down in the bed and try to unwind and stop stimulating her brain.  Pt refuses to do this saying \"maybe later\".    "

## 2019-04-09 NOTE — ED NOTES
Received call from Royce with pt placement. He will be working with us today. Stated pt still is inline for  [facility], however they need to make movement for opening. Due to that he will also be checking other places.

## 2019-04-09 NOTE — PROGRESS NOTES
Writer called the [place] Restricted Recipient Line ([phone]) to figure out who the patient is restricted to. Writer left a voicemail inquiring about this information and requested a return call.     Writer called inpatient intake to inquire about the information of the providers the patient is restricted to.. Writer was informed that the patient is restricted to [facility].     Writer spoke with the patient who reported that she is restricted to [facility]. The patient also signed an VAL for her wife Sergio but stated that she cannot remember Sergio's number. A copy of the VAL has been placed in the patient's chart.     Writer called the [facility] Restricted Recipient Line ([phone]) to get information about the patient's restrictions. Writer left a voicemail with this inquiry and requested a return call.

## 2019-04-09 NOTE — PROGRESS NOTES
"Arrived on unit at 1230. Patient under the impression that she was on a \"hold\" when writer told patient that she was a voluntary admit at that time. Patient stated \" I will not  check myself in, I want to go home\" Md notified will determine status of admission  after assessing patient. Patient agreeable to stay till Md talks with her.  Per report, patient with a history of depression, anxiety. Pt reports SI with command AH. Pt attempted suicide by running into wall to try and break her neck. Pt last week attempted to jump out of car to harm herself. Upon admission, patient  reporting anxiety and depression at a 10/10 because \" I have not been sleeping\" patient states that she takes a lot of Ambien even during daytime to help her fall asleep but cannot sleep. Did not want it reordered because \"it does not work\" patient continues to endorse auditory hallucinations. \"screaming in my ear telling me to kill myself\" states that voices are telling her to jump out of a moving vehicle and also to run into a wall so she can break her neck( both things that she had done in the past week) but surprised that \"it did not work as I am still here\". States that she does not want to kill herself but rather \" just listening and doing what the voices are telling me\" Patient denies that voices are telling her to hurt anybody else. Denies visual hallucinations.  "

## 2019-04-09 NOTE — PROGRESS NOTES
This writer attempted to call report;Suyapa ESPINO said she needed to talk to her supervisor first then she would call back.
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
## 2019-04-09 NOTE — ED NOTES
Pt c/o nausea.  Pt states has prescription at home for as needed zofran due to nausea and vomiting at home.  Will give pt zofran ODT

## 2019-04-09 NOTE — ED NOTES
Royce from intake called to say station 20 will be taking this patient. Report can be called at [phone].
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
## 2019-04-10 PROCEDURE — 99232 SBSQ HOSP IP/OBS MODERATE 35: CPT | Performed by: PSYCHIATRY & NEUROLOGY

## 2019-04-10 PROCEDURE — 25000132 ZZH RX MED GY IP 250 OP 250 PS 637: Performed by: PSYCHIATRY & NEUROLOGY

## 2019-04-10 PROCEDURE — 12400001 ZZH R&B MH UMMC

## 2019-04-10 RX ORDER — ZIPRASIDONE HYDROCHLORIDE 20 MG/1
40 CAPSULE ORAL 2 TIMES DAILY WITH MEALS
Status: DISCONTINUED | OUTPATIENT
Start: 2019-04-10 | End: 2019-04-12

## 2019-04-10 RX ADMIN — ZIPRASIDONE HCL 20 MG: 20 CAPSULE ORAL at 09:00

## 2019-04-10 RX ADMIN — NICOTINE 1 PATCH: 7 PATCH TRANSDERMAL at 09:00

## 2019-04-10 RX ADMIN — NICOTINE POLACRILEX 2 MG: 2 GUM, CHEWING BUCCAL at 19:12

## 2019-04-10 RX ADMIN — NICOTINE POLACRILEX 2 MG: 2 GUM, CHEWING BUCCAL at 16:29

## 2019-04-10 RX ADMIN — LORAZEPAM 1 MG: 1 TABLET ORAL at 19:12

## 2019-04-10 RX ADMIN — LORAZEPAM 1 MG: 1 TABLET ORAL at 16:29

## 2019-04-10 RX ADMIN — TRAZODONE HYDROCHLORIDE 50 MG: 50 TABLET ORAL at 22:53

## 2019-04-10 RX ADMIN — ZIPRASIDONE HCL 40 MG: 20 CAPSULE ORAL at 19:12

## 2019-04-10 RX ADMIN — NICOTINE POLACRILEX 2 MG: 2 GUM, CHEWING BUCCAL at 09:51

## 2019-04-10 RX ADMIN — LORAZEPAM 1 MG: 1 TABLET ORAL at 09:07

## 2019-04-10 RX ADMIN — HYDROXYZINE HYDROCHLORIDE 25 MG: 25 TABLET ORAL at 22:53

## 2019-04-10 RX ADMIN — ACETAMINOPHEN 650 MG: 325 TABLET, FILM COATED ORAL at 15:00

## 2019-04-10 RX ADMIN — ESZOPICLONE 1 MG: 1 TABLET, FILM COATED ORAL at 21:44

## 2019-04-10 RX ADMIN — NICOTINE POLACRILEX 2 MG: 2 GUM, CHEWING BUCCAL at 21:40

## 2019-04-10 ASSESSMENT — ACTIVITIES OF DAILY LIVING (ADL)
HYGIENE/GROOMING: INDEPENDENT
LAUNDRY: WITH SUPERVISION
ORAL_HYGIENE: INDEPENDENT
DRESS: INDEPENDENT
ORAL_HYGIENE: INDEPENDENT
DRESS: INDEPENDENT
HYGIENE/GROOMING: INDEPENDENT

## 2019-04-10 NOTE — PROGRESS NOTES
04/10/19 1522   Behavioral Health   Hallucinations denies / not responding to hallucinations   Thinking poor concentration   Orientation person: oriented;place: oriented;date: oriented   Memory baseline memory   Insight poor   Judgement impaired   Eye Contact at examiner   Affect blunted, flat   Mood mood is calm;depressed   Physical Appearance/Attire attire appropriate to age and situation   Hygiene well groomed   Suicidality other (see comments)  (due to high unit acuity was not able to check in)   1. Wish to be Dead   (due to high unit acuity was not able to check in)   2. Non-Specific Active Suicidal Thoughts    (due to high unit acuity was not able to check in)   Self Injury other (see comment)  (due to high unit acuity was not able to check in)   Activity isolative;withdrawn  (in morning, went to group and was social in afternoon)   Speech clear;coherent   Medication Sensitivity no observed side effects   Psychomotor / Gait balanced;steady   Psycho Education   Type of Intervention 1:1 intervention   Response other (see comment)   Treatment Detail   (due to high unit acuity was not able to check in)   Activities of Daily Living   Hygiene/Grooming independent   Oral Hygiene independent   Dress independent   Room Organization independent   due to high unit acuity was not able to check in.  The patient seemed distant and depressed in the AM, but came out for breakfast.  The patient attended a group and was more social with peers in the afternoon.

## 2019-04-10 NOTE — PROGRESS NOTES
BEHAVIORAL TEAM DISCUSSION     Participants: Dr. Garcia and BHAVANI Cope ([facility])  Progress: Ayana Prasad with a past medical history of GERD, ADHD, schizoaffective disorder bipolar type, cannabis use, alprazolam abuse, detrimental FEN use, hallucinogen use, methamphetamine use, tobacco use, optic neuritis, cauda equina syndrome, posttraumatic stress disorder, borderline personality disorder, cocaine use, nephrolithiasis was admitted 4/9/2019 for for thoughts of suicide and command hallucinations.  Continued Stay Criteria/Rationale: Continued stabilization Medical/Physical: Please see medical notes.   Precautions:           Behavioral Orders   Procedures     Code 1 - Restrict to Unit     Routine Programming       As clinically indicated     Seizure precautions     Status 15       Every 15 minutes.      Plan: Patient will continue to be engaged in the therapeutic milieu. Pt will continue to be engaged in groups where she will continue to learn positive coping skills for her symptoms. Pt s medication adjustments/management will continue. Care coordination will include inpatient and outpatient follow-up considerations for health maintenance.      Rationale for change in precautions or plan: None.
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
## 2019-04-10 NOTE — PROGRESS NOTES
BEHAVIORAL TEAM DISCUSSION     Participants: Dr. Mendoza and BHAVANI Cope ([facility])  Progress: Ayana Prasad with a past medical history of GERD, ADHD, schizoaffective disorder bipolar type, cannabis use, alprazolam abuse, detrimental FEN use, hallucinogen use, methamphetamine use, tobacco use, optic neuritis, cauda equina syndrome, posttraumatic stress disorder, borderline personality disorder, cocaine use, nephrolithiasis was admitted 4/9/2019 for for thoughts of suicide and command hallucinations.  Continued Stay Criteria/Rationale: Continued stabilization Medical/Physical: Please see medical notes.   Precautions:           Behavioral Orders   Procedures     Code 1 - Restrict to Unit     Routine Programming       As clinically indicated     Seizure precautions     Status 15       Every 15 minutes.      Plan: Patient will continue to be engaged in the therapeutic milieu. Pt will continue to be engaged in groups where she will continue to learn positive coping skills for her symptoms. Pt s medication adjustments/management will continue. Care coordination will include inpatient and outpatient follow-up considerations for health maintenance.      Rationale for change in precautions or plan: None.

## 2019-04-10 NOTE — PROGRESS NOTES
BEHAVIORAL TEAM DISCUSSION     Participants: Dr. Mendoza and BHAVANI Cope ([facility])  Progress: Ayana Prasad with a past medical history of GERD, ADHD, schizoaffective disorder bipolar type, cannabis use, alprazolam abuse, detrimental FEN use, hallucinogen use, methamphetamine use, tobacco use, optic neuritis, cauda equina syndrome, posttraumatic stress disorder, borderline personality disorder, cocaine use, nephrolithiasis was admitted 4/9/2019 for for thoughts of suicide and command hallucinations.  Continued Stay Criteria/Rationale: Continued stabilization Medical/Physical: Please see medical notes.   Precautions:        Behavioral Orders   Procedures     Code 1 - Restrict to Unit     Routine Programming       As clinically indicated     Seizure precautions     Status 15       Every 15 minutes.      Plan: Patient will continue to be engaged in the therapeutic milieu. Pt will continue to be engaged in groups where she will continue to learn positive coping skills for her symptoms. Pt s medication adjustments/management will continue. Care coordination will include inpatient and outpatient follow-up considerations for health maintenance.      Rationale for change in precautions or plan: None.

## 2019-04-10 NOTE — H&P
"[facility]   Psychiatric History & Physical  Admission date: 4/9/2019  Ayana Prasad  [id number]  [birth year]    Time: 78 minutes on encounter, >50% of which was spent in counseling and/or coordination of care consisting of: communication and education with the patient/family, lab/image/study evaluation, support staff communication, and other sources pertinent to excellent patient care.            Chief Complaint:   \"I do not want to be here but I know I have problems\"        HPI:   Ayana Prasad with a past medical history of GERD, ADHD, schizoaffective disorder bipolar type, cannabis use, alprazolam abuse, detrimental FEN use, hallucinogen use, methamphetamine use, tobacco use, optic neuritis, cauda equina syndrome, posttraumatic stress disorder, borderline personality disorder, cocaine use, nephrolithiasis was admitted 4/9/2019 for for thoughts of suicide and command hallucinations.    According to documentation she has been having worsening depression symptoms and suicidal thinking.  Command hallucinations to harm herself has not been sleeping and her parents have disowned her.  She was brought into the hospital by her wife and is currently not going to school and is not working.    Upon visiting with her she says she does not feel well and that she has been acting strangely and feels confused.  Describes impulsive behaviors and not having memory of what she does.  Endorses anhedonia attention and concentration problems and rapid thoughts.  Does not have any hopelessness or helplessness or guilty feelings or grandiose ideas about herself.  Does have command hallucinations describing her to harm herself.  Denies thoughts of wanting to harm herself or harm anyone else.  Worried about other people attempting to harm her.  Over the past 2 weeks she has additionally had a sensation of bugs crawling on her and she knows they are not actually there.  Does still have post traumatic " stress disorder symptoms that she continues to have nightmares related to this but is unclear how frequent.  She describes social anxiety disorder symptoms and obsessive-compulsive disorder symptoms of generalized anxiety disorder symptoms.  Denies any gambling pornography sexual or shopping addiction.  Has had previous manic episodes and no eating disorder history.  No recent self injury related to personality disorder condition does have rapid alternating mood and mood dysregulation.    Is interested in getting assistance and would be willing to start medications.  Discussed restarting ziprasidone in addition to Lunesta as being something that was previously helpful without major side effect.  She is reluctant to stay in the hospital but also does not want to be committed as has happened in the past.  Discussed how generally the fastest way to stabilize is to start medications and be voluntarily in the hospital for stabilization.    Upon speaking with her wife Ms. Hill at [phone] at her place of employment.  She describes dangerous behaviors confusion and actually going along with command hallucinations to run into a wall to harm herself.  She additionally jumped off a moving vehicle and is acting erratically and impulsive.  She has not placed anyone else in danger and has not threatened anyone else as they do have small children at the home.  She is hopeful that she will voluntarily stay in the hospital but understands if we have to commit her if she is going along with treatment recommendations.    She has lost weight over the past several months of about 20 pounds that was unintentional.  She otherwise has been physically healthy.        Past Psychiatric History:     Psychiatric history started as a teenager with substance use, abuse, and ADHD.  Attempted suicide once years ago by overdose no recent suicide attempts.  Has had more than 12 inpatient psychiatric hospitalizations and previous traumatic brain  injury from playing hockey.  No seizures or electroconvulsive therapy and no current psychiatrist.  Previous medications include duloxetine, olanzapine, hydroxyzine, benztropine, gabapentin, lithium, Lunesta, lorazepam, prazosin, risperidone, propranolol, lamotrigine, ziprasidone, aripiprazole, mirtazapine, Vyvanse, quetiapine.  She has been in trouble for disorderly conduct to long-term has had previous violent behavior follow-up.  Previously committed May 2017.          Substance Use and History:     Substance use started as a teenager with tobacco currently chewing about 1 can a week previously smoked cigarettes but currently not.  Alcohol use started years ago does not have any DUIs or legal troubles related to this.  He will consider this a teenager last used within the past few months.  Methamphetamine she tried once in the past has not used recently.  Benzodiazepines she previously used but has not had any until hospitalization.  Cannabis she has not used for a long time previous use was as a teenager.  Acid and hallucinations started as a teenager has not months.  To chemical dependency treatment facilities no legal issues related to substance.          Past Medical History:   PAST MEDICAL HISTORY:   Past Medical History:   Diagnosis Date     ADHD (attention deficit hyperactivity disorder)      Bipolar 1 disorder, manic, mild      Cauda equina syndrome      Depressive disorder      GERD (gastroesophageal reflux disease)      Marginal corneal ulcer, left 7/17/2015     Nephrolithiasis      Polysubstance abuse     currently in remission       PAST SURGICAL HISTORY:   Past Surgical History:   Procedure Laterality Date     BACK SURGERY - For Cauda Equina Syndrome  12/24/2016     COLONOSCOPY       COMBINED CYSTOSCOPY, INSERT STENT URETER(S) Left 8/30/2018    Procedure: COMBINED CYSTOSCOPY, INSERT STENT URETER(S);  Cystoscopy With Left Stent Placement;  Surgeon: Kiran Ulrich MD;  Location: [facility]      COMBINED CYSTOSCOPY, RETROGRADES, EXCHANGE STENT URETER(S) Left 10/14/2018    Procedure: COMBINED CYSTOSCOPY, RETROGRADES, EXCHANGE STENT URETER(S);  Cystoscopy and removal of left-sided stent.;  Surgeon: Stiven Olivo MD;  Location:  OR     COMBINED CYSTOSCOPY, RETROGRADES, URETEROSCOPY, INSERT STENT  1/6/2014    Procedure: COMBINED CYSTOSCOPY, RETROGRADES, URETEROSCOPY, INSERT STENT;  Cystyoscopy place left ureteral stent;  Surgeon: Jaun Kimble MD;  Location: [facility]     COMBINED CYSTOSCOPY, RETROGRADES, URETEROSCOPY, INSERT STENT Left 10/23/2018    Procedure: Video cystoscopy, left ureteroscopy with stone extraction;  Surgeon: Bull Mast MD;  Location:  OR     CYSTOSCOPY, URETEROSCOPY, COMBINED Right 9/23/2015    Procedure: COMBINED CYSTOSCOPY, URETEROSCOPY;  Surgeon: ROME Jett MD;  Location: [facility]     ENT SURGERY       ESOPHAGOSCOPY, GASTROSCOPY, DUODENOSCOPY (EGD), COMBINED  4/8/2013    Procedure: COMBINED ESOPHAGOSCOPY, GASTROSCOPY, DUODENOSCOPY (EGD), BIOPSY SINGLE OR MULTIPLE;  Gastroscopy;  Surgeon: Peter Pickard MD;  Location: [facility]     ESOPHAGOSCOPY, GASTROSCOPY, DUODENOSCOPY (EGD), COMBINED Left 10/28/2014    Procedure: COMBINED ESOPHAGOSCOPY, GASTROSCOPY, DUODENOSCOPY (EGD), BIOPSY SINGLE OR MULTIPLE;  Surgeon: Narcisa Ramirez MD;  Location:  OR     ESOPHAGOSCOPY, GASTROSCOPY, DUODENOSCOPY (EGD), COMBINED N/A 12/24/2018    Procedure: COMBINED ESOPHAGOSCOPY, GASTROSCOPY, DUODENOSCOPY (EGD), BIOPSY SINGLE OR MULTIPLE;  Surgeon: Sonu Verduzco MD;  Location: [facility]     LAPAROSCOPIC CHOLECYSTECTOMY  11/20/2014    [facility], Dr. Ramirez     LASER HOLMIUM LITHOTRIPSY URETER(S), INSERT STENT, COMBINED  4/2/2014    Procedure: COMBINED CYSTOSCOPY, URETEROSCOPY, LASER HOLMIUM LITHOTRIPSY URETER(S), INSERT STENT;  Cystoscopy,Left Ureteral Stent Removal,Left Ureteroscopy with Laser Lithotripsy,Left Ureter Stent Placement;  Surgeon: ROME Jett MD;  Location:  [facility]     Transurethral stone resection  03/11/2011             Family History:   FAMILY HISTORY:   Family History   Problem Relation Age of Onset     Gastrointestinal Disease Father         Crohn's Disease     Cancer Father         Liver Cancer     Other Cancer Father         Liver     Cancer Maternal Grandmother         lympoma     Diabetes Maternal Grandmother      Mental Illness Maternal Grandmother      Other Cancer Maternal Grandmother         Non Hodgkins Lymphoma     Diabetes Maternal Grandfather      Hypertension Maternal Grandfather      Prostate Cancer Maternal Grandfather      Hyperlipidemia Maternal Grandfather      Heart Disease Paternal Grandfather         heart disease     Hypertension Brother      Other Cancer Brother         Esophegeal cancer     Diabetes Brother      Hyperlipidemia Mother      Mental Illness Mother      Anxiety Disorder Mother      Anesthesia Reaction Mother      Hypertension Brother      Other Cancer Brother      Other Cancer Paternal Half-Brother         Esophageal           Social History:   SOCIAL HISTORY:   Social History     Socioeconomic History     Marital status:      Spouse name: Not on file     Number of children: 0     Years of education: Not on file     Highest education level: Not on file   Occupational History     Employer: [employer]   Social Needs     Financial resource strain: Not on file     Food insecurity:     Worry: Not on file     Inability: Not on file     Transportation needs:     Medical: Not on file     Non-medical: Not on file   Tobacco Use     Smoking status: Former Smoker     Packs/day: 0.50     Years: 5.00     Pack years: 2.50     Types: Other     Start date: 8/1/2011     Smokeless tobacco: Current User     Types: Chew     Tobacco comment: Smokes E-cig   Substance and Sexual Activity     Alcohol use: No     Alcohol/week: 0.0 oz     Drug use: No     Types: Marijuana, IV, Codeine     Comment: past use of marijuana, heroin and cocaine      Sexual activity: Yes     Partners: Female     Birth control/protection: None   Lifestyle     Physical activity:     Days per week: Not on file     Minutes per session: Not on file     Stress: Not on file   Relationships     Social connections:     Talks on phone: Not on file     Gets together: Not on file     Attends Zoroastrian service: Not on file     Active member of club or organization: Not on file     Attends meetings of clubs or organizations: Not on file     Relationship status: Not on file     Intimate partner violence:     Fear of current or ex partner: Not on file     Emotionally abused: Not on file     Physically abused: Not on file     Forced sexual activity: Not on file   Other Topics Concern     Parent/sibling w/ CABG, MI or angioplasty before 65F 55M? No   Social History Narrative    Originally from [place] has an abuse history completed school on time currently has a bachelor's degree from college.  Has a wife and 3 children lives with them in a home.  No  history no access to guns or weapons enjoys fishing.            Physical ROS:   The patient endorsed the above issues. The remainder of 10-point review of systems was negative except as noted in HPI.         PTA Medications:     Medications Prior to Admission   Medication Sig Dispense Refill Last Dose     norelgestromin-ethinyl estradiol (ORTHO EVRA) 150-35 MCG/24HR patch Remove old patch and apply new patch onto the skin once a week for 3 weeks (21 days). Ok to wear consistently 12 patch 3 4/4/2019 at [facility]     ondansetron (ZOFRAN) 4 MG tablet Take 1 tablet (4 mg) by mouth every 6 hours as needed for nausea 21 tablet 3 Unknown at AM     traZODone (DESYREL) 50 MG tablet Take 50 mg by mouth nightly as needed for sleep (May repeat in 30 minutes if ineffective)    Unknown at Unknown time          Allergies:     Allergies   Allergen Reactions     Haldol [Haloperidol] Other (See Comments)     Makes patient very angry and anxious     Seroquel  "[Quetiapine] Palpitations     Spent 2 weeks in the hospital due to having seroquel, caused palpitations and QT prolongation     Zyprexa [Olanzapine] Other (See Comments)     Makes patient incredibly agitated and anxious     Adhesive Tape Hives     Percocet [Oxycodone-Acetaminophen] Nausea and Vomiting     Prednisone Other (See Comments) and Hives     Suicidal ideation     Risperidone Other (See Comments)     Droperidol Anxiety          Labs:   No results found for this or any previous visit (from the past 48 hour(s)).       Physical and Psychiatric Examination:     BP (!) 135/94   Pulse 106   Temp 98.6  F (37  C) (Oral)   Resp 16   Ht 1.702 m (5' 7\")   Wt 74.8 kg (165 lb)   SpO2 100%   BMI 25.84 kg/m    Weight is 165 lbs 0 oz  Body mass index is 25.84 kg/m .                              Standing Orthostatic BP: 134/82      Standing Orthostatic Pulse: 101 bpm     Last 4 weights:  Wt Readings from Last 4 Encounters:   04/09/19 74.8 kg (165 lb)   04/07/19 75.3 kg (166 lb)   03/25/19 75.7 kg (166 lb 12.8 oz)   03/22/19 75.7 kg (166 lb 12.8 oz)       Cetabolic risk assessment. 04/09/19      Reviewed patient profile for cardiometabolic risk factors    Date taken /Value  REFERENCE RANGE   Abdominal Obesity  (Waist Circumference)   See nursing flowsheet Women ?35 in (88 cm)   Men ?40 in (102 cm)      Triglycerides  Triglycerides   Date Value Ref Range Status   10/03/2017 246 (H) <150 mg/dL Final     Comment:     Borderline high:  150-199 mg/dl  High:             200-499 mg/dl  Very high:       >499 mg/dl         ?150 mg/dL (1.7 mmol/L) or current treatment for elevated triglycerides   HDL cholesterol  HDL Cholesterol   Date Value Ref Range Status   10/03/2017 50 >49 mg/dL Final   ]   Women <50 mg/dL (1.3 mmol/L) in women or current treatment for low HDL cholesterol  Men <40 mg/dL (1 mmol/L) in men or current treatment for low HDL cholesterol     Fasting plasma glucose (FPG) Lab Results   Component Value Date    GLC " 106 04/07/2019      FPG ?100 mg/dL (5.6 mmol/L) or treatment for elevated blood glucose   Blood pressure  BP Readings from Last 3 Encounters:   04/09/19 (!) 135/94   04/09/19 105/66   04/03/19 122/88        Blood pressure ?130/85 mmHg or treatment for elevated blood pressure   Family History  See family history           Physical Exam:  I have reviewed the physical exam as documented by Rohit on 4/7 and agree with findings and assessment and have no additional findings to add at this time.    Mental Status Exam:  Ayana is a 28-year-old female that appears older than stated age with short hair and tattoos.  Her speech is of an appropriate rate and tone in her language is intact.  Her behavior is appropriate and she does not have any abnormal movements.  Her affect is anxious.  Her mood she describes as shit.  Her thought content consists of the above without thoughts of harming herself or others and continued delusions.  Her thought process is ruminative without looseness of association.  She does have abnormal perceptions.  She is alert and aware of her current location and circumstances.  Her attention and concentration appear adequate.  Her cognition and fund of knowledge is normal.  Her long-term/short-term/remote memory is limited.  Her insight and judgment are both limited to impaired.         Admission Diagnoses:   Schizoaffective disorder bipolar type  Posttraumatic stress disorder  Social anxiety disorder  Borderline personality disorder  Tobacco use disorder  History of traumatic brain injury         Assessment & Plan:     Assessment:  Ayana is currently suffering with command hallucinations to harm herself and has been acting upon them.  She has not been sleeping very much and has some manic symptoms leading to current dysfunction.  With her impulsive behaviors of jumping out of moving vehicles and throwing herself into walls she meets criteria for continued hospitalization and stabilization prior to  discharge.  If she would asked to leave there is no evidence of dangerousness to hold her against her will and file for commitment.  Likely she is accepting of treatment at this point and discussed her case with her and her wife about current goals of stabilization and discharge hopefully later this week if she performs well and improves.  Into her and documentation she did well on ziprasidone and we will restart this medication along with sleep agents and anxiety agents to assist her.  It appears as though she has not been using any substances which is currently not a confounding factor.  We will continue to monitor her and once stabilized she can discharge from the hospital.    Plan:  Continue voluntary hospitalization would be holdable if she attempted to leave  Starting ziprasidone 20 mg twice a day with food starting lorazepam as needed for agitation and anxiety  Starting Lunesta at night to assist with sleep             Calvin Sebastian  [facility]      The following document has been created with voice recognition software and may contain unintentional word substitutions.        Non clinically relevant CMS requirements:  Clinical Global Impressions  First:  Considering your total clinical experience with this particular patient population, how severe are the patient's symptoms at this time?: 7 (04/09/19 2013)  Compared to the patient's condition at the START of treatment, this patient's condition is:: 4 (04/09/19 2013)  Most recent:  Considering your total clinical experience with this particular patient population, how severe are the patient's symptoms at this time?: 7 (04/09/19 2013)  Compared to the patient's condition at the START of treatment, this patient's condition is:: 4 (04/09/19 2013)

## 2019-04-10 NOTE — PROGRESS NOTES
CTC: Writer met with pt. She reported that she wants to communicate to the court that she is in the hospital (needs a letter from the doctor to this effect) so she does not get arrested for missing court, since she was admitted yesterday.

## 2019-04-11 PROCEDURE — 25000132 ZZH RX MED GY IP 250 OP 250 PS 637: Performed by: PSYCHIATRY & NEUROLOGY

## 2019-04-11 PROCEDURE — 12400001 ZZH R&B MH UMMC

## 2019-04-11 RX ADMIN — NORELGESTROMIN AND ETHINYL ESTRADIOL 1 PATCH: 35; 150 PATCH TRANSDERMAL at 09:13

## 2019-04-11 RX ADMIN — NICOTINE POLACRILEX 2 MG: 2 GUM, CHEWING BUCCAL at 22:15

## 2019-04-11 RX ADMIN — ZIPRASIDONE HCL 40 MG: 20 CAPSULE ORAL at 09:09

## 2019-04-11 RX ADMIN — LORAZEPAM 1 MG: 1 TABLET ORAL at 23:40

## 2019-04-11 RX ADMIN — NICOTINE POLACRILEX 2 MG: 2 GUM, CHEWING BUCCAL at 20:54

## 2019-04-11 RX ADMIN — ZIPRASIDONE HCL 40 MG: 20 CAPSULE ORAL at 17:43

## 2019-04-11 RX ADMIN — NICOTINE 1 PATCH: 7 PATCH TRANSDERMAL at 09:09

## 2019-04-11 RX ADMIN — LORAZEPAM 1 MG: 1 TABLET ORAL at 17:10

## 2019-04-11 RX ADMIN — ESZOPICLONE 1 MG: 1 TABLET, FILM COATED ORAL at 22:15

## 2019-04-11 RX ADMIN — LORAZEPAM 1 MG: 1 TABLET ORAL at 09:18

## 2019-04-11 RX ADMIN — NICOTINE POLACRILEX 2 MG: 2 GUM, CHEWING BUCCAL at 16:02

## 2019-04-11 RX ADMIN — NICOTINE POLACRILEX 2 MG: 2 GUM, CHEWING BUCCAL at 20:04

## 2019-04-11 RX ADMIN — NICOTINE POLACRILEX 2 MG: 2 GUM, CHEWING BUCCAL at 11:34

## 2019-04-11 RX ADMIN — ACETAMINOPHEN 650 MG: 325 TABLET, FILM COATED ORAL at 11:51

## 2019-04-11 RX ADMIN — NICOTINE POLACRILEX 2 MG: 2 GUM, CHEWING BUCCAL at 09:10

## 2019-04-11 ASSESSMENT — ACTIVITIES OF DAILY LIVING (ADL)
DRESS: INDEPENDENT
HYGIENE/GROOMING: INDEPENDENT
ORAL_HYGIENE: INDEPENDENT
LAUNDRY: WITH SUPERVISION

## 2019-04-11 NOTE — PROGRESS NOTES
Patient up early this shift but mostly kept to herself. Observed in dining room coloring. At times in room napping. Patient had a blunted affect. Denied SI/SIB and hallucinations.        04/11/19 1524   Behavioral Health   Hallucinations denies / not responding to hallucinations   Thinking distractable   Orientation person: oriented;place: oriented;date: oriented;time: oriented   Memory baseline memory   Insight poor   Judgement impaired   Eye Contact at examiner   Affect blunted, flat   Mood mood is calm   Physical Appearance/Attire appears stated age   Hygiene well groomed   Suicidality other (see comments)  (denied )   1. Wish to be Dead No   2. Non-Specific Active Suicidal Thoughts  No   Self Injury   (denied )   Elopement   (none observed )   Activity withdrawn   Speech clear;coherent   Medication Sensitivity no stated side effects   Psychomotor / Gait balanced;steady   Psycho Education   Type of Intervention 1:1 intervention   Response participates, initiates socially appropriate   Hours 0.5   Treatment Detail   (check-in )   Activities of Daily Living   Hygiene/Grooming independent   Oral Hygiene independent   Dress independent   Laundry with supervision   Room Organization independent   Groups   Details   (did not attend groups)

## 2019-04-11 NOTE — PROGRESS NOTES
"Pt approached writer wanting to sign a 12-hour intent to leave. Writer explained the possible outcomes of this being signed including being put on a 72-hour hold not including weekends. Writer explained that due to the severity of her command hallucinations and other symptoms that there is a high likelihood that she would be put on a hold. Pt signed the form at 8:15 PM. On-call provider notified.     Pt refused to take her lunesta. Pt stated, \"can we skip that? I don't really want to sleep tonight.\" When asked why, pt stated \"I just don't.\"     Pt requested her last two doses of ativan during this evening shift. When asked if she was feeling anxious she stated \"people make me nervous.\" but would not elaborate further.   "

## 2019-04-11 NOTE — PROGRESS NOTES
"   04/10/19 2100   Behavioral Health   Hallucinations denies / not responding to hallucinations   Thinking distractable   Orientation person: oriented;place: oriented   Memory baseline memory   Insight poor   Judgement impaired   Eye Contact at examiner   Affect full range affect   Mood mood is calm   Physical Appearance/Attire neat   Hygiene well groomed   Suicidality other (see comments)  (denies )   1. Wish to be Dead No   2. Non-Specific Active Suicidal Thoughts  No   Self Injury other (see comment)  (denies )   Activity other (see comment)  (visible in the milieu and socialized with others )   Speech clear;coherent   Activities of Daily Living   Hygiene/Grooming independent   Oral Hygiene independent   Dress independent   Laundry with supervision   Room Organization independent     Pt denied SI and SIB.  Pt reported feeling confused (4) and anxious (10) \"alvarez I'm don't know the date and time I'm leaving.\"  Pt reported overall feeling \"good.\"  Pt seems calm, ate supper, visible in the milieu and socialized with others.  Pt is independent with ADL's.  Pt wants visitors \" just my wife and my mother in law.\"    "

## 2019-04-11 NOTE — PROGRESS NOTES
[facility], [place]   Psychiatric Progress Note  Ayana Prasad  [id number]  [birth year]    Chief Complaint: Continued medical care          Interim History:   The patient's care was discussed with the treatment team during the daily team meeting and/or staff's chart notes were reviewed.  Staff report patient having some elevated blood pressure 135/94 and some tachycardia.  She is reluctantly staying in the hospital to get better so she can go home.  Upon meeting with me she says that she is feeling fine no current side effects from the initiation of medications and did get some sleep last night.  Still has severe auditory hallucinations telling her to end her life and is highly distracted by them.  Denies any intention or thoughts of harming herself or others or any paranoia.  She denies any guilty feelings grandiose ideas hopelessness or helplessness or energy troubles.  Does have some attention concentration deficits and is highly anxious here on the unit interacting with other people.  She is understanding that she needs to remain in the hospital for further improvement prior to discharge and understands she would likely be placed on a 72-hour hold and committed if she asked to leave.  Agreed to take a higher dose of ziprasidone.         Medications:       eszopiclone  1 mg Oral At Bedtime     nicotine  1 patch Transdermal Daily     nicotine   Transdermal Q8H     nicotine   Transdermal Daily     [START ON 4/11/2019] norelgestromin-ethinyl estradiol  1 patch Transdermal Weekly     norelgestromin-ethinyl estradiol   Transdermal Q8H    And     [START ON 4/11/2019] norelgestromin-ethinyl estradiol   Transdermal Weekly     ziprasidone  40 mg Oral BID w/meals          Allergies:     Allergies   Allergen Reactions     Haldol [Haloperidol] Other (See Comments)     Makes patient very angry and anxious     Seroquel [Quetiapine] Palpitations     Spent 2 weeks in the hospital due to having  "seroquel, caused palpitations and QT prolongation     Zyprexa [Olanzapine] Other (See Comments)     Makes patient incredibly agitated and anxious     Adhesive Tape Hives     Percocet [Oxycodone-Acetaminophen] Nausea and Vomiting     Prednisone Other (See Comments) and Hives     Suicidal ideation     Risperidone Other (See Comments)     Droperidol Anxiety          Labs:   No results found for this or any previous visit (from the past 24 hour(s)).       Psychiatric Examination:     /71   Pulse 73   Temp 99  F (37.2  C) (Oral)   Resp 16   Ht 1.702 m (5' 7\")   Wt 74.8 kg (165 lb)   SpO2 100%   BMI 25.84 kg/m    Weight is 165 lbs 0 oz  Body mass index is 25.84 kg/m .                Sitting Orthostatic BP: 114/78      Sitting Orthostatic Pulse: 70 bpm      Standing Orthostatic BP: 113/84      Standing Orthostatic Pulse: 74 bpm       Weight over time:  Vitals:    04/09/19 1241   Weight: 74.8 kg (165 lb)       Orthostatic Vitals       Most Recent      Sitting Orthostatic /78 04/10 0904    Sitting Orthostatic Pulse (bpm) 70 04/10 0904    Standing Orthostatic /84 04/10 0904    Standing Orthostatic Pulse (bpm) 74 04/10 0904            Cardiometabolic risk assessment. 04/10/19      Reviewed patient profile for cardiometabolic risk factors    Date taken /Value  REFERENCE RANGE   Abdominal Obesity  (Waist Circumference)   See nursing flowsheet Women ?35 in (88 cm)   Men ?40 in (102 cm)      Triglycerides  Triglycerides   Date Value Ref Range Status   10/03/2017 246 (H) <150 mg/dL Final     Comment:     Borderline high:  150-199 mg/dl  High:             200-499 mg/dl  Very high:       >499 mg/dl         ?150 mg/dL (1.7 mmol/L) or current treatment for elevated triglycerides   HDL cholesterol  HDL Cholesterol   Date Value Ref Range Status   10/03/2017 50 >49 mg/dL Final   ]   Women <50 mg/dL (1.3 mmol/L) in women or current treatment for low HDL cholesterol  Men <40 mg/dL (1 mmol/L) in men or current " treatment for low HDL cholesterol     Fasting plasma glucose (FPG) Lab Results   Component Value Date     04/07/2019      FPG ?100 mg/dL (5.6 mmol/L) or treatment for elevated blood glucose   Blood pressure  BP Readings from Last 3 Encounters:   04/10/19 106/71   04/09/19 105/66   04/03/19 122/88    Blood pressure ?130/85 mmHg or treatment for elevated blood pressure   Family History  See family history         Ayana is a 28-year-old female that appears older than stated age with short hair and tattoos.  Her speech is of an appropriate rate and tone in her language is intact.  Her behavior is appropriate and she does not have any abnormal movements.  Her affect is anxious.  Her mood she describes as shit.  Her thought content consists of the above without thoughts of harming herself or others and continued delusions.  Her thought process is ruminative without looseness of association.  She does have abnormal perceptions.  She is alert and aware of her current location and circumstances.  Her attention and concentration appear adequate.  Her cognition and fund of knowledge is normal.  Her long-term/short-term/remote memory is limited.  Her insight and judgment are both limited to impaired.         Precautions:     Behavioral Orders   Procedures     Code 1 - Restrict to Unit     Routine Programming     As clinically indicated     Self Injury Precaution     Status 15     Every 15 minutes.     Suicide precautions     Patients on Suicide Precautions should have a Combination Diet ordered that includes a Diet selection(s) AND a Behavioral Tray selection for Safe Tray - with utensils, or Safe Tray - NO utensils            DIagnoses:     Schizoaffective disorder bipolar type  Posttraumatic stress disorder  Social anxiety disorder  Borderline personality disorder  Tobacco use disorder  History of traumatic brain injury         Assessment & Plan:     Ayana is still currently decompensated has not had any improvement  with current treatment.  Still having severe command hallucinations telling her to end her life and she has been confused and impulsive jumping out of moving vehicles prior to hospitalization and acting upon command hallucinations to attempt suicide.  Is currently not safe outside of the hospital and is understanding that if she attempted to leave she would be committed.  She was wanting to avoid a legal process as was her wife.  Recommended increasing ziprasidone to 40 mg and she was in agreement.    Continue voluntary hospitalization would be holdable if she attempted to leave  Increase ziprasidone to 40 mg twice a day with food  Future option of trifluoperazine which was previously beneficial    The risks, benefits, alternatives and side effects have been discussed and are understood by the patient and other caregivers.      Calvin Sebastian  [facility]      The following document has been created with voice recognition software and may contain unintentional word substitutions.    Non clinically relevant CMS requirements:  Clinical Global Impressions  First:  Considering your total clinical experience with this particular patient population, how severe are the patient's symptoms at this time?: 7 (04/09/19 2013)  Compared to the patient's condition at the START of treatment, this patient's condition is:: 4 (04/09/19 2013)  Most recent:  Considering your total clinical experience with this particular patient population, how severe are the patient's symptoms at this time?: 7 (04/09/19 2013)  Compared to the patient's condition at the START of treatment, this patient's condition is:: 4 (04/09/19 2013)

## 2019-04-12 ENCOUNTER — APPOINTMENT (OUTPATIENT)
Dept: GENERAL RADIOLOGY | Facility: CLINIC | Age: 29
End: 2019-04-12
Attending: PSYCHIATRY & NEUROLOGY
Payer: COMMERCIAL

## 2019-04-12 PROCEDURE — 25000132 ZZH RX MED GY IP 250 OP 250 PS 637: Performed by: PSYCHIATRY & NEUROLOGY

## 2019-04-12 PROCEDURE — 73120 X-RAY EXAM OF HAND: CPT | Mod: RT

## 2019-04-12 PROCEDURE — 12400001 ZZH R&B MH UMMC

## 2019-04-12 PROCEDURE — 99232 SBSQ HOSP IP/OBS MODERATE 35: CPT | Performed by: PSYCHIATRY & NEUROLOGY

## 2019-04-12 RX ORDER — TRIFLUOPERAZINE HYDROCHLORIDE 2 MG/1
2 TABLET, FILM COATED ORAL 2 TIMES DAILY
Status: DISCONTINUED | OUTPATIENT
Start: 2019-04-12 | End: 2019-04-15

## 2019-04-12 RX ORDER — CLONIDINE HYDROCHLORIDE 0.1 MG/1
0.1 TABLET ORAL 2 TIMES DAILY
Status: DISCONTINUED | OUTPATIENT
Start: 2019-04-12 | End: 2019-04-15

## 2019-04-12 RX ORDER — TRIFLUOPERAZINE HYDROCHLORIDE 2 MG/1
2 TABLET, FILM COATED ORAL 2 TIMES DAILY
Status: DISCONTINUED | OUTPATIENT
Start: 2019-04-12 | End: 2019-04-12

## 2019-04-12 RX ORDER — LORAZEPAM 1 MG/1
1 TABLET ORAL EVERY 4 HOURS PRN
Status: DISCONTINUED | OUTPATIENT
Start: 2019-04-12 | End: 2019-04-15 | Stop reason: HOSPADM

## 2019-04-12 RX ADMIN — NICOTINE POLACRILEX 2 MG: 2 GUM, CHEWING BUCCAL at 12:02

## 2019-04-12 RX ADMIN — TRIFLUOPERAZINE HYDROCHLORIDE 2 MG: 2 TABLET, FILM COATED ORAL at 22:11

## 2019-04-12 RX ADMIN — ESZOPICLONE 1 MG: 1 TABLET, FILM COATED ORAL at 22:11

## 2019-04-12 RX ADMIN — NICOTINE POLACRILEX 2 MG: 2 GUM, CHEWING BUCCAL at 15:48

## 2019-04-12 RX ADMIN — LORAZEPAM 1 MG: 1 TABLET ORAL at 20:16

## 2019-04-12 RX ADMIN — LORAZEPAM 1 MG: 1 TABLET ORAL at 10:13

## 2019-04-12 RX ADMIN — TRIFLUOPERAZINE HYDROCHLORIDE 2 MG: 2 TABLET, FILM COATED ORAL at 16:08

## 2019-04-12 RX ADMIN — ACETAMINOPHEN 650 MG: 325 TABLET, FILM COATED ORAL at 12:42

## 2019-04-12 RX ADMIN — NICOTINE POLACRILEX 2 MG: 2 GUM, CHEWING BUCCAL at 20:16

## 2019-04-12 RX ADMIN — NICOTINE POLACRILEX 2 MG: 2 GUM, CHEWING BUCCAL at 09:02

## 2019-04-12 RX ADMIN — ACETAMINOPHEN 650 MG: 325 TABLET, FILM COATED ORAL at 17:30

## 2019-04-12 RX ADMIN — HYDROXYZINE HYDROCHLORIDE 25 MG: 25 TABLET ORAL at 22:33

## 2019-04-12 RX ADMIN — ACETAMINOPHEN 650 MG: 325 TABLET, FILM COATED ORAL at 21:37

## 2019-04-12 RX ADMIN — NICOTINE POLACRILEX 2 MG: 2 GUM, CHEWING BUCCAL at 22:33

## 2019-04-12 RX ADMIN — NICOTINE 1 PATCH: 7 PATCH TRANSDERMAL at 08:10

## 2019-04-12 RX ADMIN — NICOTINE POLACRILEX 2 MG: 2 GUM, CHEWING BUCCAL at 21:37

## 2019-04-12 RX ADMIN — LORAZEPAM 1 MG: 1 TABLET ORAL at 15:47

## 2019-04-12 RX ADMIN — CLONIDINE HYDROCHLORIDE 0.1 MG: 0.1 TABLET ORAL at 22:11

## 2019-04-12 RX ADMIN — NICOTINE POLACRILEX 2 MG: 2 GUM, CHEWING BUCCAL at 07:57

## 2019-04-12 RX ADMIN — NICOTINE POLACRILEX 2 MG: 2 GUM, CHEWING BUCCAL at 18:52

## 2019-04-12 RX ADMIN — NICOTINE POLACRILEX 2 MG: 2 GUM, CHEWING BUCCAL at 13:20

## 2019-04-12 RX ADMIN — NICOTINE POLACRILEX 2 MG: 2 GUM, CHEWING BUCCAL at 10:13

## 2019-04-12 ASSESSMENT — ACTIVITIES OF DAILY LIVING (ADL)
DRESS: INDEPENDENT
HYGIENE/GROOMING: INDEPENDENT
ORAL_HYGIENE: INDEPENDENT
ORAL_HYGIENE: INDEPENDENT
HYGIENE/GROOMING: INDEPENDENT
DRESS: INDEPENDENT
LAUNDRY: WITH SUPERVISION

## 2019-04-12 NOTE — PROGRESS NOTES
Patient out and about. Spent the shift in common area reading, mostly social with staff. Reports feeling bored, wishes to be transfer to station 30. Patient upset about being on 72H hold, repeatedly asking when hold is up. Denied SI/SIB.        04/12/19 1445   Behavioral Health   Hallucinations denies / not responding to hallucinations   Thinking distractable   Orientation person: oriented;place: oriented;date: oriented;time: oriented   Memory baseline memory   Insight poor   Judgement impaired   Eye Contact at examiner   Affect blunted, flat   Mood mood is calm   Physical Appearance/Attire appears stated age   Hygiene well groomed   Suicidality other (see comments)  (denied )   1. Wish to be Dead No   2. Non-Specific Active Suicidal Thoughts  No   Self Injury   (denied )   Elopement Trying handles of doors;Statements about wanting to leave   Activity withdrawn   Speech clear;coherent   Medication Sensitivity no stated side effects   Psychomotor / Gait balanced;steady   Psycho Education   Type of Intervention 1:1 intervention   Response participates, initiates socially appropriate   Hours 0.5   Treatment Detail   (check-in)   Safety   Suicidality Status 15   Activities of Daily Living   Hygiene/Grooming independent   Oral Hygiene independent   Dress independent   Laundry with supervision   Room Organization independent   Groups   Details   (did not attend groups)

## 2019-04-12 NOTE — PLAN OF CARE
"48 Hour Assessment    /67   Pulse 78   Temp 98  F (36.7  C) (Oral)   Resp 16   Ht 1.702 m (5' 7\")   Wt 74.8 kg (165 lb)   SpO2 100%   BMI 25.84 kg/m      Pt visible in the milieu for the majority of the shift. She displays a blunted affect, and socializes minimally. Patient's wife came to visit patient during visiting hours, and reports this visit was positive.     SI/SIB: Pt currently denies,     Auditory/Visual Hallucinations: Denies. Does not appear responding.     Pt eating and taking medications without issue. She reports no additional concerns at this time. Will continue to assess and offer support.   "

## 2019-04-12 NOTE — PROGRESS NOTES
Writer spoke with the patient to check in. She reported that she feels she is doing better and is ready for discharge. She inquired about when the attending would be on the unit to meet with her. Writer informed the patient that the attending will likely be on the unit later this afternoon. Writer inquired about if the patient has been in contact with her wife and if so, if her wife feels that she is doing better. The patient stated that her wife visited with her last night and planning to do so again today. She stated that her wife she said she looked drugged up from the medication. The patient stated that is why she refused her medication today. Writer encouraged the patient to speak with the attending about a medication adjustment. The patient denied any other concerns at this time.

## 2019-04-12 NOTE — PROGRESS NOTES
called in the petition for commitment and faxed the paperwork to [facility].  was told that the case would be staffed and someone should reach out to polyr soon.      received a voicemail from Avril with [facility] Pre-petition ([phone] or [phone]). Avril stated that she will not be able to make it out to see the patient today but will plan to on Monday morning. She reported that she will attempt to have her report completed by 2pm on Monday. She requested that  fax over the legal documentation (which has already been done) and stated that she will request records from the weekend on Sunday. Avril provided her contact information in case a return call was needed.

## 2019-04-12 NOTE — PROGRESS NOTES
Pt refused her Geodon, this morning. She said it makes her feel restless. She will talk to her Dr when he arrives for today's visit.

## 2019-04-13 LAB — RADIOLOGIST FLAGS: NORMAL

## 2019-04-13 PROCEDURE — 25000132 ZZH RX MED GY IP 250 OP 250 PS 637: Performed by: PSYCHIATRY & NEUROLOGY

## 2019-04-13 PROCEDURE — 12400001 ZZH R&B MH UMMC

## 2019-04-13 RX ADMIN — CLONIDINE HYDROCHLORIDE 0.1 MG: 0.1 TABLET ORAL at 08:07

## 2019-04-13 RX ADMIN — TRIFLUOPERAZINE HYDROCHLORIDE 2 MG: 2 TABLET, FILM COATED ORAL at 20:17

## 2019-04-13 RX ADMIN — LORAZEPAM 1 MG: 1 TABLET ORAL at 12:55

## 2019-04-13 RX ADMIN — NICOTINE POLACRILEX 2 MG: 2 GUM, CHEWING BUCCAL at 08:07

## 2019-04-13 RX ADMIN — ESZOPICLONE 1 MG: 1 TABLET, FILM COATED ORAL at 21:41

## 2019-04-13 RX ADMIN — NICOTINE POLACRILEX 2 MG: 2 GUM, CHEWING BUCCAL at 09:08

## 2019-04-13 RX ADMIN — ACETAMINOPHEN 650 MG: 325 TABLET, FILM COATED ORAL at 11:32

## 2019-04-13 RX ADMIN — NICOTINE POLACRILEX 2 MG: 2 GUM, CHEWING BUCCAL at 14:35

## 2019-04-13 RX ADMIN — LORAZEPAM 1 MG: 1 TABLET ORAL at 18:11

## 2019-04-13 RX ADMIN — NICOTINE POLACRILEX 2 MG: 2 GUM, CHEWING BUCCAL at 21:11

## 2019-04-13 RX ADMIN — TRIFLUOPERAZINE HYDROCHLORIDE 2 MG: 2 TABLET, FILM COATED ORAL at 08:07

## 2019-04-13 RX ADMIN — NICOTINE POLACRILEX 2 MG: 2 GUM, CHEWING BUCCAL at 20:17

## 2019-04-13 RX ADMIN — CLONIDINE HYDROCHLORIDE 0.1 MG: 0.1 TABLET ORAL at 20:17

## 2019-04-13 RX ADMIN — NICOTINE 1 PATCH: 7 PATCH TRANSDERMAL at 08:11

## 2019-04-13 RX ADMIN — NICOTINE POLACRILEX 2 MG: 2 GUM, CHEWING BUCCAL at 10:29

## 2019-04-13 RX ADMIN — NICOTINE POLACRILEX 2 MG: 2 GUM, CHEWING BUCCAL at 18:10

## 2019-04-13 RX ADMIN — NICOTINE POLACRILEX 2 MG: 2 GUM, CHEWING BUCCAL at 11:29

## 2019-04-13 RX ADMIN — LORAZEPAM 1 MG: 1 TABLET ORAL at 08:21

## 2019-04-13 RX ADMIN — NICOTINE POLACRILEX 2 MG: 2 GUM, CHEWING BUCCAL at 12:54

## 2019-04-13 RX ADMIN — ACETAMINOPHEN 650 MG: 325 TABLET, FILM COATED ORAL at 18:37

## 2019-04-13 NOTE — PROGRESS NOTES
Patient had a positive evening. She denies SI/SIB. Restless. Full-range affect. Poor concentration. Visible in the milieu and social with others. Patient reports auditory hallucinations, but notes that they have improved.    Appetite: Good  Pain: N/A  SEs: N/A  ADLs: Independent          04/12/19 2100   Behavioral Health   Hallucinations denies / not responding to hallucinations   Thinking distractable;poor concentration   Orientation person: oriented;place: oriented;date: oriented   Memory baseline memory   Insight poor   Judgement impaired   Eye Contact at examiner   Affect full range affect   Mood mood is calm   Physical Appearance/Attire untidy   Hygiene other (see comment)  (fair)   Suicidality other (see comments)  (denies)   1. Wish to be Dead No   2. Non-Specific Active Suicidal Thoughts  No   Enviromental Risk Factors None   Self Injury other (see comment)  (denies)   Elopement Statements about wanting to leave   Activity restless   Speech clear;coherent   Medication Sensitivity no stated side effects;no observed side effects   Psychomotor / Gait balanced;steady   Safety   Suicidality Status 15   Activities of Daily Living   Hygiene/Grooming independent   Oral Hygiene independent   Dress independent   Room Organization independent

## 2019-04-13 NOTE — PROGRESS NOTES
[facility], [place]   Psychiatric Progress Note  Ayana Prasad  [id number]  [birth year]    Chief Complaint: Continued medical care          Interim History:   The patient's care was discussed with the treatment team during the daily team meeting and/or staff's chart notes were reviewed.  Staff report patient has been primarily keeping to herself wants to go home.  Was feeling sedated from the medication and wanted to switch medications to trifluoperazine.  No hopelessness or helplessness but does describe poor sleep lack of energy and continued hallucinations.  Says the hallucinations are somewhat improved because she has been getting some sleep.  Denies any paranoia still has high anxiety and posttraumatic stress nightmares now that she is sleeping.  Believes that the anxiety is more of the problem and is interested in starting clonidine for ADHD augmentation which she believes is a problem for her.  No guilty feelings or grandiose ideas is eating and drinking well.  Discussed legal status in detail.         Medications:       cloNIDine  0.1 mg Oral BID     eszopiclone  1 mg Oral At Bedtime     nicotine  1 patch Transdermal Daily     nicotine   Transdermal Q8H     nicotine   Transdermal Daily     norelgestromin-ethinyl estradiol  1 patch Transdermal Weekly     norelgestromin-ethinyl estradiol   Transdermal Q8H    And     norelgestromin-ethinyl estradiol   Transdermal Weekly     trifluoperazine  2 mg Oral BID          Allergies:     Allergies   Allergen Reactions     Haldol [Haloperidol] Other (See Comments)     Makes patient very angry and anxious     Seroquel [Quetiapine] Palpitations     Spent 2 weeks in the hospital due to having seroquel, caused palpitations and QT prolongation     Zyprexa [Olanzapine] Other (See Comments)     Makes patient incredibly agitated and anxious     Adhesive Tape Hives     Percocet [Oxycodone-Acetaminophen] Nausea and Vomiting     Prednisone Other (See  "Comments) and Hives     Suicidal ideation     Risperidone Other (See Comments)     Droperidol Anxiety          Labs:   No results found for this or any previous visit (from the past 24 hour(s)).       Psychiatric Examination:     /67   Pulse 78   Temp 98.3  F (36.8  C) (Oral)   Resp 16   Ht 1.702 m (5' 7\")   Wt 74.8 kg (165 lb)   SpO2 100%   BMI 25.84 kg/m    Weight is 165 lbs 0 oz  Body mass index is 25.84 kg/m .                Sitting Orthostatic BP: 142/78      Sitting Orthostatic Pulse: 96 bpm      Standing Orthostatic BP: 122/85      Standing Orthostatic Pulse: 84 bpm       Weight over time:  Vitals:    04/09/19 1241   Weight: 74.8 kg (165 lb)       Orthostatic Vitals       Most Recent      Sitting Orthostatic /78 04/12 1639    Sitting Orthostatic Pulse (bpm) 96 04/12 1639    Standing Orthostatic /85 04/12 0739    Standing Orthostatic Pulse (bpm) 84 04/12 0739            Cardiometabolic risk assessment. 04/12/19      Reviewed patient profile for cardiometabolic risk factors    Date taken /Value  REFERENCE RANGE   Abdominal Obesity  (Waist Circumference)   See nursing flowsheet Women ?35 in (88 cm)   Men ?40 in (102 cm)      Triglycerides  Triglycerides   Date Value Ref Range Status   10/03/2017 246 (H) <150 mg/dL Final     Comment:     Borderline high:  150-199 mg/dl  High:             200-499 mg/dl  Very high:       >499 mg/dl         ?150 mg/dL (1.7 mmol/L) or current treatment for elevated triglycerides   HDL cholesterol  HDL Cholesterol   Date Value Ref Range Status   10/03/2017 50 >49 mg/dL Final   ]   Women <50 mg/dL (1.3 mmol/L) in women or current treatment for low HDL cholesterol  Men <40 mg/dL (1 mmol/L) in men or current treatment for low HDL cholesterol     Fasting plasma glucose (FPG) Lab Results   Component Value Date     04/07/2019      FPG ?100 mg/dL (5.6 mmol/L) or treatment for elevated blood glucose   Blood pressure  BP Readings from Last 3 Encounters: "   04/11/19 124/67   04/09/19 105/66   04/03/19 122/88    Blood pressure ?130/85 mmHg or treatment for elevated blood pressure   Family History  See family history         Ayana is a 28-year-old female that appears older than stated age with short hair and tattoos.  Her speech is of an appropriate rate and tone in her language is intact.  Her behavior is appropriate and she does not have any abnormal movements.  Her affect is anxious.  Her mood she describes as ok.  Her thought content consists of the above without thoughts of harming herself or others and continued delusions.  Her thought process is ruminative without looseness of association.  She does have abnormal perceptions.  She is alert and aware of her current location and circumstances.  Her attention and concentration appear adequate.  Her cognition and fund of knowledge is normal.  Her long-term/short-term/remote memory is limited.  Her insight and judgment are both limited to impaired.         Precautions:     Behavioral Orders   Procedures     Code 1 - Restrict to Unit     Code 2     xr     Routine Programming     As clinically indicated     Self Injury Precaution     Status 15     Every 15 minutes.     Suicide precautions     Patients on Suicide Precautions should have a Combination Diet ordered that includes a Diet selection(s) AND a Behavioral Tray selection for Safe Tray - with utensils, or Safe Tray - NO utensils            DIagnoses:     Schizoaffective disorder bipolar type  Posttraumatic stress disorder  Social anxiety disorder  Borderline personality disorder  Tobacco use disorder  History of traumatic brain injury         Assessment & Plan:     Ayana is still experiencing intense command hallucinations and has had no relief from them.  She is sleeping a little bit better and appears more aware of current circumstances.  She requested to leave the hospital and signed a 12-hour intent.  Based on dangerous behaviors prior to hospital was placed  on a 72-hour hold and filing for commitment was started.  Is attempting to appear as though she is improved so she can discharge home but understands that she will not be leaving.  Was possibly having sedation from ziprasidone and wanted to switch medications.  Trifluoperazine was something she mentioned was previously beneficial discussed risks and benefits associated with it.    Currently on a 72-hour hold and filing for commitment  Discontinuing ziprasidone starting trifluoperazine 2 mg    The risks, benefits, alternatives and side effects have been discussed and are understood by the patient and other caregivers.      Calvin Sebastian  [facility]      The following document has been created with voice recognition software and may contain unintentional word substitutions.    Non clinically relevant CMS requirements:  Clinical Global Impressions  First:  Considering your total clinical experience with this particular patient population, how severe are the patient's symptoms at this time?: 7 (04/09/19 2013)  Compared to the patient's condition at the START of treatment, this patient's condition is:: 4 (04/09/19 2013)  Most recent:  Considering your total clinical experience with this particular patient population, how severe are the patient's symptoms at this time?: 7 (04/09/19 2013)  Compared to the patient's condition at the START of treatment, this patient's condition is:: 4 (04/09/19 2013)

## 2019-04-13 NOTE — PROGRESS NOTES
Pt visible in milieu and a little withdrawn. Pt had visitors that brought her some belongings. Pt played the WII with staff.      04/13/19 1414   Behavioral Health   Hallucinations denies / not responding to hallucinations   Thinking distractable   Orientation person: oriented;place: oriented;date: oriented;time: oriented   Memory baseline memory   Insight poor   Judgement impaired   Eye Contact at examiner   Affect full range affect   Mood mood is calm   Physical Appearance/Attire appears stated age   Hygiene well groomed   Suicidality other (see comments)  (no)   1. Wish to be Dead No   2. Non-Specific Active Suicidal Thoughts  No   Self Injury other (see comment)  (no)   Activity withdrawn   Speech clear;coherent   Medication Sensitivity no stated side effects   Psychomotor / Gait balanced   Psycho Education   Type of Intervention 1:1 intervention   Response participates, initiates socially appropriate   Hours 0.5   Activities of Daily Living   Hygiene/Grooming independent   Oral Hygiene independent   Dress independent   Laundry with supervision   Room Organization independent

## 2019-04-14 PROCEDURE — 99207 ZZC CONSULT E&M CHANGED TO SUBSEQUENT LEVEL: CPT | Performed by: NURSE PRACTITIONER

## 2019-04-14 PROCEDURE — 25000132 ZZH RX MED GY IP 250 OP 250 PS 637: Performed by: PSYCHIATRY & NEUROLOGY

## 2019-04-14 PROCEDURE — 12400001 ZZH R&B MH UMMC

## 2019-04-14 PROCEDURE — 99232 SBSQ HOSP IP/OBS MODERATE 35: CPT | Performed by: NURSE PRACTITIONER

## 2019-04-14 RX ADMIN — NICOTINE POLACRILEX 2 MG: 2 GUM, CHEWING BUCCAL at 11:15

## 2019-04-14 RX ADMIN — ACETAMINOPHEN 650 MG: 325 TABLET, FILM COATED ORAL at 07:30

## 2019-04-14 RX ADMIN — ESZOPICLONE 1 MG: 1 TABLET, FILM COATED ORAL at 21:32

## 2019-04-14 RX ADMIN — TRIFLUOPERAZINE HYDROCHLORIDE 2 MG: 2 TABLET, FILM COATED ORAL at 20:20

## 2019-04-14 RX ADMIN — NICOTINE POLACRILEX 2 MG: 2 GUM, CHEWING BUCCAL at 08:33

## 2019-04-14 RX ADMIN — NICOTINE POLACRILEX 2 MG: 2 GUM, CHEWING BUCCAL at 16:06

## 2019-04-14 RX ADMIN — CLONIDINE HYDROCHLORIDE 0.1 MG: 0.1 TABLET ORAL at 07:30

## 2019-04-14 RX ADMIN — NICOTINE POLACRILEX 2 MG: 2 GUM, CHEWING BUCCAL at 20:21

## 2019-04-14 RX ADMIN — NICOTINE POLACRILEX 2 MG: 2 GUM, CHEWING BUCCAL at 19:16

## 2019-04-14 RX ADMIN — NICOTINE POLACRILEX 2 MG: 2 GUM, CHEWING BUCCAL at 07:30

## 2019-04-14 RX ADMIN — TRIFLUOPERAZINE HYDROCHLORIDE 2 MG: 2 TABLET, FILM COATED ORAL at 07:30

## 2019-04-14 RX ADMIN — NICOTINE POLACRILEX 2 MG: 2 GUM, CHEWING BUCCAL at 21:33

## 2019-04-14 RX ADMIN — LORAZEPAM 1 MG: 1 TABLET ORAL at 13:22

## 2019-04-14 RX ADMIN — LORAZEPAM 1 MG: 1 TABLET ORAL at 07:30

## 2019-04-14 RX ADMIN — NICOTINE 1 PATCH: 7 PATCH TRANSDERMAL at 07:31

## 2019-04-14 RX ADMIN — CLONIDINE HYDROCHLORIDE 0.1 MG: 0.1 TABLET ORAL at 20:20

## 2019-04-14 ASSESSMENT — ACTIVITIES OF DAILY LIVING (ADL)
DRESS: INDEPENDENT
HYGIENE/GROOMING: INDEPENDENT
LAUNDRY: WITH SUPERVISION
HYGIENE/GROOMING: INDEPENDENT
ORAL_HYGIENE: INDEPENDENT
LAUNDRY: WITH SUPERVISION
ORAL_HYGIENE: INDEPENDENT
DRESS: INDEPENDENT

## 2019-04-14 NOTE — CONSULTS
[facility]  ORTHOPAEDIC SURGERY HISTORY AND PHYSICAL    CC: Right hand pain    DATE OF INJURY: 4/7/19?    HISTORY OF PRESENT ILLNESS:   The orthopaedic surgery service was consulted by Dr. Garcia, Calvin Moy* for evaluation and treatment recommendations of right hand pain.    Ayana Prasad is a 28 year old right-hand dominant female who has a history of TBI, polysubstance abuse, ADHD, bipolar type schizoaffective disorder, PTSD, depression, and borderline personality disorder who is currently admitted to the psychiatry service for depression, and suicidal ideation.  Patient has been complaining of right hand pain.  Medicine was consulted, and asked for further input from orthopedics.    The patient reports she noticed pain in her right hand 2-3 days ago.  She does not recall any trauma to the hand, but per chart review, she sustained a self-inflicted injury to her right hand on 4/7/2019 when she banged her hand while agitated awaiting transfer.  She describes achy pain across the palmar surface of her volar small and ring fingers.  She does have a palpable bump at the base of the ring finger which she thinks is new over the past few days.  Pain is worse when she tries to grasp an object.  She denies numbness or tingling.  The pain does radiate slightly on the ulnar side of her hand.  She has never had anything like this in the past.  She denies any other lumps, bumps, or abnormal bruising.  She reports a history of a boxer's fracture in the past, but otherwise has never injured or had surgery on her right hand.      PAST MEDICAL HISTORY:   Past Medical History:   Diagnosis Date     ADHD (attention deficit hyperactivity disorder)      Bipolar 1 disorder, manic, mild      Cauda equina syndrome      Depressive disorder      GERD (gastroesophageal reflux disease)      Marginal corneal ulcer, left 7/17/2015     Nephrolithiasis      Polysubstance abuse     currently in remission       PAST SURGICAL  HISTORY:    Past Surgical History:   Procedure Laterality Date     BACK SURGERY - For Cauda Equina Syndrome  12/24/2016     COLONOSCOPY       COMBINED CYSTOSCOPY, INSERT STENT URETER(S) Left 8/30/2018    Procedure: COMBINED CYSTOSCOPY, INSERT STENT URETER(S);  Cystoscopy With Left Stent Placement;  Surgeon: Kiran Ulrich MD;  Location: [facility]     COMBINED CYSTOSCOPY, RETROGRADES, EXCHANGE STENT URETER(S) Left 10/14/2018    Procedure: COMBINED CYSTOSCOPY, RETROGRADES, EXCHANGE STENT URETER(S);  Cystoscopy and removal of left-sided stent.;  Surgeon: Stiven Olivo MD;  Location:  OR     COMBINED CYSTOSCOPY, RETROGRADES, URETEROSCOPY, INSERT STENT  1/6/2014    Procedure: COMBINED CYSTOSCOPY, RETROGRADES, URETEROSCOPY, INSERT STENT;  Cystyoscopy place left ureteral stent;  Surgeon: Jaun Kimble MD;  Location: [facility] OR     COMBINED CYSTOSCOPY, RETROGRADES, URETEROSCOPY, INSERT STENT Left 10/23/2018    Procedure: Video cystoscopy, left ureteroscopy with stone extraction;  Surgeon: Bull Mast MD;  Location:  OR     CYSTOSCOPY, URETEROSCOPY, COMBINED Right 9/23/2015    Procedure: COMBINED CYSTOSCOPY, URETEROSCOPY;  Surgeon: ROME Jett MD;  Location: [facility] OR     ENT SURGERY       ESOPHAGOSCOPY, GASTROSCOPY, DUODENOSCOPY (EGD), COMBINED  4/8/2013    Procedure: COMBINED ESOPHAGOSCOPY, GASTROSCOPY, DUODENOSCOPY (EGD), BIOPSY SINGLE OR MULTIPLE;  Gastroscopy;  Surgeon: Peter Pickard MD;  Location: [facility]     ESOPHAGOSCOPY, GASTROSCOPY, DUODENOSCOPY (EGD), COMBINED Left 10/28/2014    Procedure: COMBINED ESOPHAGOSCOPY, GASTROSCOPY, DUODENOSCOPY (EGD), BIOPSY SINGLE OR MULTIPLE;  Surgeon: Narcisa Ramirez MD;  Location:  OR     ESOPHAGOSCOPY, GASTROSCOPY, DUODENOSCOPY (EGD), COMBINED N/A 12/24/2018    Procedure: COMBINED ESOPHAGOSCOPY, GASTROSCOPY, DUODENOSCOPY (EGD), BIOPSY SINGLE OR MULTIPLE;  Surgeon: Sonu Verduzco MD;  Location: [facility] GI     LAPAROSCOPIC CHOLECYSTECTOMY  11/20/2014     [facility], Dr. Ramirez     LASER HOLMIUM LITHOTRIPSY URETER(S), INSERT STENT, COMBINED  4/2/2014    Procedure: COMBINED CYSTOSCOPY, URETEROSCOPY, LASER HOLMIUM LITHOTRIPSY URETER(S), INSERT STENT;  Cystoscopy,Left Ureteral Stent Removal,Left Ureteroscopy with Laser Lithotripsy,Left Ureter Stent Placement;  Surgeon: ROME Jett MD;  Location: [facility] OR     Transurethral stone resection  03/11/2011       MEDICATIONS:   Prior to Admission medications    Medication Sig Last Dose Taking? Auth Provider   norelgestromin-ethinyl estradiol (ORTHO EVRA) 150-35 MCG/24HR patch Remove old patch and apply new patch onto the skin once a week for 3 weeks (21 days). Ok to wear consistently 4/4/2019 at Am Yes Becki Avery APRN CNP   ondansetron (ZOFRAN) 4 MG tablet Take 1 tablet (4 mg) by mouth every 6 hours as needed for nausea Unknown at AM  Becki Avery APRN CNP   traZODone (DESYREL) 50 MG tablet Take 50 mg by mouth nightly as needed for sleep (May repeat in 30 minutes if ineffective)  Unknown at Unknown time  Unknown, Entered By History       ALLERGIES:   Haldol [haloperidol]; Seroquel [quetiapine]; Zyprexa [olanzapine]; Adhesive tape; Percocet [oxycodone-acetaminophen]; Prednisone; Risperidone; and Droperidol    SOCIAL HISTORY:   Reviewed in epic.    FAMILY HISTORY:  Family History   Problem Relation Age of Onset     Gastrointestinal Disease Father         Crohn's Disease     Cancer Father         Liver Cancer     Other Cancer Father         Liver     Cancer Maternal Grandmother         lympoma     Diabetes Maternal Grandmother      Mental Illness Maternal Grandmother      Other Cancer Maternal Grandmother         Non Hodgkins Lymphoma     Diabetes Maternal Grandfather      Hypertension Maternal Grandfather      Prostate Cancer Maternal Grandfather      Hyperlipidemia Maternal Grandfather      Heart Disease Paternal Grandfather         heart disease     Hypertension Brother      Other Cancer  Brother         Esophegeal cancer     Diabetes Brother      Hyperlipidemia Mother      Mental Illness Mother      Anxiety Disorder Mother      Anesthesia Reaction Mother      Hypertension Brother      Other Cancer Brother      Other Cancer Paternal Half-Brother         Esophageal       Patient denies known family history of bleeding, clotting, or anesthesia related complications.     REVIEW OF SYSTEMS:   Positive for above in the HPI. Otherwise a 10-point reviews of systems was negative except as noted above in the HPI.   PHYSICAL EXAM:   Vitals:    04/12/19 2137 04/13/19 1647 04/13/19 2000 04/14/19 0820   BP: 125/87 99/62 118/73    Pulse: 85 85 88    Resp:  16  18   Temp:  98.5  F (36.9  C)  98.3  F (36.8  C)   TempSrc:  Oral  Oral   SpO2:       Weight:       Height:         General: Awake, alert, appropriate, following commands, NAD.  Neuro: CN II-XII grossly intact.   Skin: No rashes,  skin color normal.  HEENT: Normal.   Lungs: Breathing comfortably and nonlabored, no wheezes or stridor noted.  Heart/Cardiovascular: Regular pulse, no peripheral cyanosis.  Right Upper Extremity: Resolving bruising over the dorsum of the right hand.  No significant swelling about the right hand.  No erythema, or other signs of infection.  Patient is tender to palpation over the volar aspect of the base of the ring finger.  She does have a firm palpable mass that is slightly eccentric to just radial to the A1 pulley.  It is tender to palpation.  It does not move with the tendon. FDS and FDP to all fingers are intact. There is no catching or locking palpable.  No other lumps or bumps are palpable.  Mildly tender over the hyperthenar eminence.  Wrist range of motion is full without significant pain.  No pain with radial or ulnar deviation.  N/V intact 5/5 EPL/FPL/IO.  Sensation intact light touch at the median/radial/ulnar nerve distributions.  Palpable radial pulse, all fingers warm and well-perfused.    LABS:  Hemoglobin   Date  Value Ref Range Status   04/07/2019 14.0 11.7 - 15.7 g/dL Final   03/22/2019 15.2 11.7 - 15.7 g/dL Final     WBC   Date Value Ref Range Status   04/07/2019 14.2 (H) 4.0 - 11.0 10e9/L Final     Platelet Count   Date Value Ref Range Status   04/07/2019 357 150 - 450 10e9/L Final     INR   Date Value Ref Range Status   01/06/2014 1.18 (H) 0.86 - 1.14 Final     Creatinine   Date Value Ref Range Status   04/07/2019 0.68 0.52 - 1.04 mg/dL Final     Glucose   Date Value Ref Range Status   04/07/2019 106 (H) 70 - 99 mg/dL Final       IMAGING:  3 views of the right hand from 4/8/2019 as well as 2 views from 4/12/2019 are reviewed.  There is no fracture or dislocation noted.  No bony abnormality about the volar base of the ring finger.    IMPRESSION:   Ayana Prasad is a 28 year old right-hand dominant female with significant psychiatric history, currently admitted to inpatient psychiatry with a tender, painful, nodule over the volar ring finger flexor tendon sheath.  Most likely diagnosis is ganglion cyst of tendon sheath versus giant cell.  Other neoplasm is possible, although given the very short duration of symptoms, recommend continued symptomatic management, and observation prior to considering any advanced imaging.    RECOMMENDATIONS:   - Plan for OR: No urgent orthopedic surgery recommended.  - Activity: As tolerated  - Weight bearing: As tolerated  -No immobilization is recommended.  -Discussed the diagnosis with the patient.  Given her current psychiatric decompensation, and inpatient admission, would not recommend any intervention at this time.  Recommend symptomatic treatment with ice, elevation, and over-the-counter medications.  -If symptoms persist at the time of discharge, the patient should follow-up in orthopedic hand clinic for further evaluation.  - Follow-up: 1-2 weeks after discharge and orthopedic hand surgery.  - Disposition: Per primary    Assessment and Plan discussed with Dr. Richardson, Orthopaedic  Surgery staff.     Raghu Horowitz MD 04/14/19  Orthopaedic Surgery Resident, PGY-4      For questions about this patient, please contact me at my pager.

## 2019-04-14 NOTE — PLAN OF CARE
"Pt was minimally visible in the milieu. Withdrawn in her room most of the evening. Took a long nap before dinner. Pt was less social with peers and staff. Upon approach pt was bright. Denied SI/SIB/HI. Pt endorsed AH. Pt says the voices \"are commanding telling me to do stuff\". Pt took all her medications. Took prn Tylenol for right hand pain.   "

## 2019-04-14 NOTE — PROGRESS NOTES
"Pt visible in milieu. Pt withdrawn a little. Pt eating well. Pt stated her right hand palm still hurts \"feels like my bone is sticking out\". Pts wife visited, went well and she brought her lunch. Pt hope to leave soon. Pt continues to report AH and when it happens she will come out in the milieu to distract herself which works. Pt did not report any SI or SIB.        04/14/19 1353   Behavioral Health   Hallucinations auditory   Thinking poor concentration   Orientation person: oriented;place: oriented;date: oriented;time: oriented   Memory baseline memory   Insight poor   Judgement impaired   Eye Contact at examiner   Affect blunted, flat   Mood mood is calm   Physical Appearance/Attire neat   Hygiene well groomed   Suicidality other (see comments)  (none stated)   1. Wish to be Dead No   2. Non-Specific Active Suicidal Thoughts  No   Self Injury other (see comment)  (no)   Elopement   (no)   Activity   (visible in milieu)   Speech clear;coherent   Medication Sensitivity no stated side effects   Psychomotor / Gait balanced   Psycho Education   Type of Intervention 1:1 intervention   Response participates, initiates socially appropriate   Hours 0.5   Activities of Daily Living   Hygiene/Grooming independent   Oral Hygiene independent   Dress independent   Laundry with supervision   Room Organization independent     "
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
## 2019-04-15 VITALS
OXYGEN SATURATION: 100 % | SYSTOLIC BLOOD PRESSURE: 114 MMHG | TEMPERATURE: 98.6 F | HEART RATE: 65 BPM | WEIGHT: 165 LBS | HEIGHT: 67 IN | DIASTOLIC BLOOD PRESSURE: 67 MMHG | RESPIRATION RATE: 16 BRPM | BODY MASS INDEX: 25.9 KG/M2

## 2019-04-15 PROCEDURE — 25000132 ZZH RX MED GY IP 250 OP 250 PS 637: Performed by: PSYCHIATRY & NEUROLOGY

## 2019-04-15 PROCEDURE — 99239 HOSP IP/OBS DSCHRG MGMT >30: CPT | Performed by: PSYCHIATRY & NEUROLOGY

## 2019-04-15 RX ORDER — ZOLPIDEM TARTRATE 5 MG/1
10 TABLET ORAL
Status: DISCONTINUED | OUTPATIENT
Start: 2019-04-15 | End: 2019-04-15 | Stop reason: HOSPADM

## 2019-04-15 RX ORDER — ZOLPIDEM TARTRATE 10 MG/1
10 TABLET ORAL
Qty: 30 TABLET | Refills: 0 | Status: SHIPPED | OUTPATIENT
Start: 2019-04-15 | End: 2019-04-17

## 2019-04-15 RX ORDER — TRIFLUOPERAZINE HYDROCHLORIDE 2 MG/1
2 TABLET, FILM COATED ORAL 3 TIMES DAILY
Qty: 90 TABLET | Refills: 0 | Status: SHIPPED | OUTPATIENT
Start: 2019-04-15 | End: 2019-04-17

## 2019-04-15 RX ORDER — TRIFLUOPERAZINE HYDROCHLORIDE 2 MG/1
2 TABLET, FILM COATED ORAL
Status: DISCONTINUED | OUTPATIENT
Start: 2019-04-15 | End: 2019-04-15 | Stop reason: HOSPADM

## 2019-04-15 RX ADMIN — NICOTINE POLACRILEX 2 MG: 2 GUM, CHEWING BUCCAL at 14:25

## 2019-04-15 RX ADMIN — LORAZEPAM 1 MG: 1 TABLET ORAL at 02:41

## 2019-04-15 RX ADMIN — ACETAMINOPHEN 650 MG: 325 TABLET, FILM COATED ORAL at 02:41

## 2019-04-15 RX ADMIN — NICOTINE POLACRILEX 2 MG: 2 GUM, CHEWING BUCCAL at 12:01

## 2019-04-15 RX ADMIN — NICOTINE POLACRILEX 2 MG: 2 GUM, CHEWING BUCCAL at 08:53

## 2019-04-15 RX ADMIN — TRIFLUOPERAZINE HYDROCHLORIDE 2 MG: 2 TABLET, FILM COATED ORAL at 08:52

## 2019-04-15 RX ADMIN — CLONIDINE HYDROCHLORIDE 0.1 MG: 0.1 TABLET ORAL at 08:52

## 2019-04-15 RX ADMIN — NICOTINE 1 PATCH: 7 PATCH TRANSDERMAL at 08:51

## 2019-04-15 RX ADMIN — LORAZEPAM 1 MG: 1 TABLET ORAL at 14:53

## 2019-04-15 NOTE — PROGRESS NOTES
Pt discharged from station 20 on 04/15/19. Pt is in possession of belongings upon discharge, and verbalizes understanding of discharge instructions. Pt in no acute distress. Picked up by wife, and driving to home.

## 2019-04-15 NOTE — PROGRESS NOTES
04/15/19 1522   General Information   Has Not Attended OT as of: 04/15/19     OT staff will meet with pt to review the role of occupational therapy and explain the value of having them involved in their treatment plan including options to meet current needs/self-identified goals. As group attendance is established, Pt will be given self-assessment to inform OT initial assessment.

## 2019-04-15 NOTE — PLAN OF CARE
"Problem: Adult Behavioral Health Plan of Care  Goal: Plan of Care Review  Outcome: No Change    48 Hour Assessment:    Patient napped and spent majority of the time in her room before dinner. After her meal, pt was visible in the lounge socializing with peers. Pt states \"people make her anxious\". Pt rates anxiety a 10/10. Cooperative and compliant.     Denies SI/SIB/HI. Pt reports auditory hallucinations telling her to \"run and leave the unit.\" This writer informed the nurse to place pt on elopement precautions. Pt believes her medications need adjustment. Specifically, clonidine dosage and time. Denies any SE's or acute physical distress. No further complaints or requests at this time. Will continue to monitor and offer support.        "

## 2019-04-15 NOTE — DISCHARGE INSTRUCTIONS
Behavioral Discharge Planning and Instructions  The patient has Restricted MA   Summary:  You were admitted on 4/9/2019  due to Suicidal Ideations.  You were treated by Dr. Calvin Garcia MD and discharged on 4/15/2019 from [facility] Home      Principal Diagnosis:   Schizoaffective disorder bipolar type    Health Care Follow-up Appointments:     Primary Care   Provider:  Becki Avery   [address] [address]  Phone: [phone]  Appointment Date/Time: Thursday, April 18, 2019 at 1:00pm.      Psychotherapy Referral:  Little Mitchell  [address] [address]  Phone: [phone]  Appointment Date/Time: April 23rd 2019 at 09:00am.      Attend all scheduled appointments with your outpatient providers. Call at least 24 hours in advance if you need to reschedule an appointment to ensure continued access to your outpatient providers.   Major Treatments, Procedures and Findings:  You were provided with: a psychiatric assessment, medication evaluation and/or management, group therapy and milieu management    Symptoms to Report: feeling more aggressive, increased confusion, losing more sleep, mood getting worse or thoughts of suicide    Early warning signs can include: increased depression or anxiety sleep disturbances increased thoughts or behaviors of suicide or self-harm  increased unusual thinking, such as paranoia or hearing voices    Safety and Wellness:  Take all medicines as directed.  Make no changes unless your doctor suggests them. Follow treatment recommendations. Refrain from alcohol and non-prescribed drugs.  Ask your support system to help you reduce your access to items that could harm yourself or others. Items could include:    - Firearms  - Medicines (both prescribed and over-the-counter)   - Knives and other sharp objects  - Ropes and like materials  - Car keys  If there is a concern for safety, call 911. If there is a concern for  "safety, call 911.    Resources:   [facility] Crisis (COPE) Response - Adult 965-193-9342  Crisis Intervention: 456.161.8024 or 886-597-7473 (TTY: 454.472.5351).  Call anytime for help.  National [place] on Mental Illness (www.[place].jhonatan.org): 216.157.4476 or 638-282-8563.  Suicide Awareness Voices of Education (SAVE) (www.save.org): 993-167-JBVW (9540)  National Suicide Prevention Line (www.mentalhealthmn.org): 980-169-RXGJ (5232)  Mental Health Consumer/Survivor Network of [place] (www.mhcsn.net): 939.943.7402 or 046-699-6990  Mental Health Association of [place] (www.mentalhealth.org): 247.231.5872 or 656-262-2396  Self- Management and Recovery Training., [employer]-- Toll free: 653.109.6281  www.[employer].Face.com  Text 4 Life: txt \"LIFE\" to 64442 for immediate support and crisis intervention  Crisis text line: Text \"[place]\" to 273999. Free, confidential, 24/7.  Crisis Intervention: 696.252.8098 or 666-430-1252. Call anytime for help.     The treatment team has appreciated the opportunity to work with you.     If you have any questions or concerns our unit number is [phone].   You may be receiving a follow-up phone call within the next three days from a representative from behavioral health.        "

## 2019-04-15 NOTE — PROGRESS NOTES
"Pt c/o feeling \"dizzy and headachy,\" since starting on new meds on Saturday.\" BP/P wnl, fluids encouraged; pt is drinking. Will let Dr Brooks know when he comes this afternoon. Pt is polite, and said she is ok with this.  "

## 2019-04-15 NOTE — DISCHARGE SUMMARY
[facility], [place]   Psychiatric Discharge Summary  Time: 39 minutes on encounter.    Ayana Prasad MRN# [id number]   Age: 28 year old YOB: 1990     Date of Admission:  4/9/2019  Date of Discharge:  04/15/19  Admitting Physician:  Calvin Sebastian  Discharge Physician:  Calvin Sebastian         Event Leading to Hospitalization and Hospital Course:   Ayana Prasad was admitted for suicidal thoughts and command hallucinations to harm herself.       See Admission note by Calvin Sebastian   on 4/9 for additional details.     Ayana Prasad was initially hospitalized voluntarily for command hallucinations telling her to harm herself and she was acting up on certain things that they would say including jumping out of a moving vehicle and throwing herself into a wall.  She has had a previous commitment and has a history of leaving the hospital prematurely.  She was still having serious command hallucinations and anxiety throughout her hospital stay and was placed on a 72-hour hold when she attempted to leave the hospital prematurely.  Support for commitment was not what the court did and she was not wanting to continue in the hospital.  She was accepting of restarting medications and we attempted ziprasidone which was not successful and she had side effects from.  She reported a previous benefit with trifluoperazine which we try titrated up to 2 mg 3 times a day.  On day of discharge she still has auditory hallucinations however they are slightly improved.  She has been sleeping but has difficulty on the disruptive units.  Has some hand pain and discussed her following up in the outpatient setting and getting a repeat image of the abnormality on her right hand being some type of growth possibly a ganglion cyst.  Day of discharge felt as though things were slightly better but still having serious anxiety.  Is hoping that she sleeps better at home and requested zolpidem.   No thoughts of harming herself or others no energy problems hopelessness or helplessness still has some attention concentration deficits and no guilty or grandiose ideas about herself for current manic symptoms.  Has been talking with her wife discussed current situation.  Discussed safety planning in detail even though she does not like being hospitalized.  She was willing to follow through with future safety planning and coming to the hospital if needed.           DIagnoses:   Schizoaffective disorder bipolar type  Posttraumatic stress disorder  Social anxiety disorder  Borderline personality disorder  Tobacco use disorder  History of traumatic brain injury         Labs:   No results found for this or any previous visit (from the past 24 hour(s)).         Consults:   IMPRESSION:   Ayana Prasad is a 28 year old right-hand dominant female with significant psychiatric history, currently admitted to inpatient psychiatry with a tender, painful, nodule over the volar ring finger flexor tendon sheath.  Most likely diagnosis is ganglion cyst of tendon sheath versus giant cell.  Other neoplasm is possible, although given the very short duration of symptoms, recommend continued symptomatic management, and observation prior to considering any advanced imaging.     RECOMMENDATIONS:   - Plan for OR: No urgent orthopedic surgery recommended.  - Activity: As tolerated  - Weight bearing: As tolerated  -No immobilization is recommended.  -Discussed the diagnosis with the patient.  Given her current psychiatric decompensation, and inpatient admission, would not recommend any intervention at this time.  Recommend symptomatic treatment with ice, elevation, and over-the-counter medications.  -If symptoms persist at the time of discharge, the patient should follow-up in orthopedic hand clinic for further evaluation.  - Follow-up: 1-2 weeks after discharge and orthopedic hand surgery.  - Disposition: Per primary    # R hand pain -  Reports worsening dull achy pain at 3rd-4th MCP joints and radiating down ulnar aspect of hand.  9/10 w/ movement and 7/10 at rest.  Unable to  objects.  XR 2v on 4/12 with slight cortical irregularities at base of 5th metacarpal at the ulnar aspect.  Per chart review, on 4/7 pt became agitated and struck her hand against a wall while awaiting transfer to BEH unit.  XR on 4/8 with no acute findings.  Currently managing with Tylenol and ice packs with little improvement.   - D/w Ortho given continued pain and ?bony abnormality, will attempt to see pt on unit afternoon 4/14 or AM 4/15   - For now continue ice, elevation, and APAP PRN   - No immobilizer indicated at this point      # Leukocytosis - WBCs elevated to 14.2 on 4/7, when pt had presented w/ ear pain and treated for otitis media.  Currently afebrile.             Discharge Medications:        Review of your medicines      START taking      Dose / Directions   trifluoperazine 2 MG tablet  Commonly known as:  STELAZINE  Used for:  Bipolar 1 disorder, manic, mild      Dose:  2 mg  Take 1 tablet (2 mg) by mouth 3 times daily  Quantity:  90 tablet  Refills:  0     zolpidem 10 MG tablet  Commonly known as:  AMBIEN  Used for:  Bipolar 1 disorder, manic, mild      Dose:  10 mg  Take 1 tablet (10 mg) by mouth nightly as needed for sleep  Quantity:  30 tablet  Refills:  0        CONTINUE these medicines which have NOT CHANGED      Dose / Directions   norelgestromin-ethinyl estradiol 150-35 MCG/24HR patch  Commonly known as:  ORTHO EVRA  Used for:  Menorrhagia with regular cycle      Remove old patch and apply new patch onto the skin once a week for 3 weeks (21 days). Ok to wear consistently  Quantity:  12 patch  Refills:  3     ondansetron 4 MG tablet  Commonly known as:  ZOFRAN  Used for:  Nausea      Dose:  4 mg  Take 1 tablet (4 mg) by mouth every 6 hours as needed for nausea  Quantity:  21 tablet  Refills:  3     traZODone 50 MG tablet  Commonly known as:   DESYREL      Dose:  50 mg  Take 50 mg by mouth nightly as needed for sleep (May repeat in 30 minutes if ineffective)  Refills:  0           Where to get your medicines      These medications were sent to [facility] [place], [place] - [address]  [address] [facility] [address]    Phone:  [phone]     trifluoperazine 2 MG tablet     Some of these will need a paper prescription and others can be bought over the counter. Ask your nurse if you have questions.    Bring a paper prescription for each of these medications    zolpidem 10 MG tablet              Mental Status Examination:   Ayana is a 28-year-old female that appears older than stated age with short hair and tattoos.  Her speech is of an appropriate rate and tone in her language is intact.  Her behavior is appropriate and she does not have any abnormal movements.  Her affect is anxious.  Her mood she describes as ok.  Her thought content consists of the above without thoughts of harming herself or others and continued delusions.  Her thought process is ruminative without looseness of association.  She does have abnormal perceptions.  She is alert and aware of her current location and circumstances.  Her attention and concentration appear adequate.  Her cognition and fund of knowledge is normal.  Her long-term/short-term/remote memory is limited.  Her insight and judgment are both limited to impaired.         Discharge Plan:        Reason for your hospital stay    Psychosis/haven     Activity    Your activity upon discharge: activity as tolerated     Discharge Instructions    1. Please do not harm yourself or others.  2. Please continue to take your medications.  3. Please follow up with your outpatient care team.  4. Please do not take drugs or alcohol as this will worsen your condition.  5. Please do not take more than the prescribed doses of medications as this may make them dangerous.   6. Please follow your safety plan  of action.  7. Please call crisis if having trouble.  8. If having thoughts of harming self or others please come in to the emergency department as soon as possible.     Full Code     Diet    Follow this diet upon discharge: Orders Placed This Encounter      Regular Diet Adult           Further instructions from your care team        Behavioral Discharge Planning and Instructions  The patient has Restricted MA   Summary:  You were admitted on 4/9/2019  due to Suicidal Ideations.  You were treated by Dr. Calvin Garcia MD and discharged on 4/15/2019 from [facility] to Home      Principal Diagnosis:   Schizoaffective disorder bipolar type    Health Care Follow-up Appointments:     Primary Care   Provider:  Becki Avery   [place] [id number]  Phone: [phone]  Appointment Date/Time: Thursday, April 18, 2019 at 1:00pm.      Psychotherapy Referral:  Little Mitchell  [address] [address]  Phone: [phone]  Appointment Date/Time: April 23rd 2019 at 09:00am.      Attend all scheduled appointments with your outpatient providers. Call at least 24 hours in advance if you need to reschedule an appointment to ensure continued access to your outpatient providers.   Major Treatments, Procedures and Findings:  You were provided with: a psychiatric assessment, medication evaluation and/or management, group therapy and milieu management    Symptoms to Report: feeling more aggressive, increased confusion, losing more sleep, mood getting worse or thoughts of suicide    Early warning signs can include: increased depression or anxiety sleep disturbances increased thoughts or behaviors of suicide or self-harm  increased unusual thinking, such as paranoia or hearing voices    Safety and Wellness:  Take all medicines as directed.  Make no changes unless your doctor suggests them. Follow treatment recommendations. Refrain from alcohol and non-prescribed drugs.   "Ask your support system to help you reduce your access to items that could harm yourself or others. Items could include:    - Firearms  - Medicines (both prescribed and over-the-counter)   - Knives and other sharp objects  - Ropes and like materials  - Car keys  If there is a concern for safety, call 911. If there is a concern for safety, call 911.    Resources:   [facility] (COPE) Response - Adult 511-114-2909  Crisis Intervention: 173.805.9954 or 548-131-5798 (TTY: 288.917.7726).  Call anytime for help.  National [place] on Mental Illness (www.[place].jhonatan.org): 410.463.3303 or 534-036-4334.  Suicide Awareness Voices of Education (SAVE) (www.save.org): 220-042-QMFX (0183)  National Suicide Prevention Line (www.mentalhealthmn.org): 650-304-AIKL (8323)  Mental Health Consumer/Survivor Network of [place] (www.mhcsn.net): 765.738.4908 or 431-723-2417  Mental Health Association of [place] (www.mentalhealth.org): 672.681.5864 or 930-996-7183  Self- Management and Recovery Training., [employer]-- Toll free: 296.305.1383  www.[employer]  Text 4 Life: txt \"LIFE\" to 88377 for immediate support and crisis intervention  Crisis text line: Text \"[place]\" to 154002. Free, confidential, 24/7.  Crisis Intervention: 777.802.2129 or 209-947-1353. Call anytime for help.     The treatment team has appreciated the opportunity to work with you.     If you have any questions or concerns our unit number is [phone].   You may be receiving a follow-up phone call within the next three days from a representative from behavioral health.                Please send copy to Becki Avery    During hospitalizations patients have perpetuating, complicating, situational, acute, and chronic conditions that lead to risk for suicidality or homicidality. During hospitalizations we mitigate any modifiable risk factors that may have been identified in the history and physical examination and throughout the hospitalization. Chronic non-modifiable risk " factors are not able to be changed with acute hospitalization. As a patient that is discharging these risk factors have been modified as much as possible and a supportive network and safety plan has been put in place. Further modifications and assistance will have to be obtained in the outpatient setting and the inpatient hospitalization team is no longer responsible for outcomes.    Calvin Sebastian  [facility]      The following document has been created with voice recognition software and may contain unintentional word substitutions.      Non clinically relevant CMS requirements:  Clinical Global Impressions  First:  Considering your total clinical experience with this particular patient population, how severe are the patient's symptoms at this time?: 7 (04/09/19 2013)  Compared to the patient's condition at the START of treatment, this patient's condition is:: 4 (04/09/19 2013)  Most recent:  Considering your total clinical experience with this particular patient population, how severe are the patient's symptoms at this time?: 7 (04/09/19 2013)  Compared to the patient's condition at the START of treatment, this patient's condition is:: 4 (04/09/19 2013)

## 2019-04-15 NOTE — PROGRESS NOTES
[facility] contacted [employer] Restricted Recipient line ([phone]) and arranged for prescriber restriction to be lifted for today so that hospital psychiatrist can write the discharge prescriptions.  They will be sent to patient's restricted pharmacy, which is [facility] for her to .  Patient will discharge later today to home as her petition for commitment was not supported.  Aftercare appointments are in place.

## 2019-04-16 ENCOUNTER — TELEPHONE (OUTPATIENT)
Facility: CLINIC | Age: 29
End: 2019-04-16

## 2019-04-16 ENCOUNTER — TELEPHONE (OUTPATIENT)
Dept: FAMILY MEDICINE | Facility: CLINIC | Age: 29
End: 2019-04-16

## 2019-04-16 NOTE — TELEPHONE ENCOUNTER
Review of chat pt had a behavioral health admit, no f/u call made pt has scheduled appt tomorrow   ANTHONY Brooks  Clinic  RN/Faustino Pearson

## 2019-04-16 NOTE — TELEPHONE ENCOUNTER
Prior Authorization Retail Medication Request    Medication/Dose: PA Required - Trifluoperazine 2mg  ICD code (if different than what is on RX):   Previously Tried and Failed:    Rationale:      Insurance Name:  [employer] [facility]  Insurance ID:  [id number]      Lauryn Barrera CPhT  [id number])  [address] [place] [address]  Phone: [phone]  Fax: [phone]

## 2019-04-16 NOTE — TELEPHONE ENCOUNTER
ED/UC/IP follow up phone call:    RN please call to follow up.    Number of ED visits in past 12 months = 7/1.    Faustino Degroot [facility]

## 2019-04-16 NOTE — TELEPHONE ENCOUNTER
PA Initiation    Medication: Trifluoperazine 2mg  Insurance Company: [employer] [place] - Phone [phone] Fax [phone]  Pharmacy Filling the Rx: [facility] - [place], [place] - [address]  Filling Pharmacy Phone: [phone]  Filling Pharmacy Fax:    Start Date: 4/16/2019    Central Prior Authorization Team   Phone: [phone]

## 2019-04-17 ENCOUNTER — OFFICE VISIT (OUTPATIENT)
Dept: FAMILY MEDICINE | Facility: CLINIC | Age: 29
End: 2019-04-17
Payer: COMMERCIAL

## 2019-04-17 ENCOUNTER — HOSPITAL ENCOUNTER (EMERGENCY)
Facility: CLINIC | Age: 29
Discharge: HOME | End: 2019-04-17
Attending: EMERGENCY MEDICINE | Admitting: EMERGENCY MEDICINE
Payer: COMMERCIAL

## 2019-04-17 VITALS
DIASTOLIC BLOOD PRESSURE: 94 MMHG | OXYGEN SATURATION: 100 % | SYSTOLIC BLOOD PRESSURE: 140 MMHG | RESPIRATION RATE: 18 BRPM | HEART RATE: 99 BPM

## 2019-04-17 VITALS
RESPIRATION RATE: 16 BRPM | TEMPERATURE: 98.2 F | HEART RATE: 92 BPM | SYSTOLIC BLOOD PRESSURE: 120 MMHG | DIASTOLIC BLOOD PRESSURE: 74 MMHG

## 2019-04-17 DIAGNOSIS — G47.9 SLEEP DISTURBANCES: ICD-10-CM

## 2019-04-17 DIAGNOSIS — R44.0 AUDITORY HALLUCINATION: Primary | ICD-10-CM

## 2019-04-17 DIAGNOSIS — R44.3 HALLUCINATIONS: ICD-10-CM

## 2019-04-17 DIAGNOSIS — F31.11 BIPOLAR 1 DISORDER, MANIC, MILD (H): ICD-10-CM

## 2019-04-17 DIAGNOSIS — L72.9 CYST OF SKIN: ICD-10-CM

## 2019-04-17 PROCEDURE — 99284 EMERGENCY DEPT VISIT MOD MDM: CPT | Performed by: EMERGENCY MEDICINE

## 2019-04-17 PROCEDURE — 25000132 ZZH RX MED GY IP 250 OP 250 PS 637: Performed by: EMERGENCY MEDICINE

## 2019-04-17 PROCEDURE — 99284 EMERGENCY DEPT VISIT MOD MDM: CPT | Mod: Z6 | Performed by: EMERGENCY MEDICINE

## 2019-04-17 PROCEDURE — 99214 OFFICE O/P EST MOD 30 MIN: CPT | Performed by: NURSE PRACTITIONER

## 2019-04-17 RX ORDER — LORAZEPAM 1 MG/1
1 TABLET ORAL ONCE
Status: COMPLETED | OUTPATIENT
Start: 2019-04-17 | End: 2019-04-17

## 2019-04-17 RX ORDER — ESZOPICLONE 3 MG/1
3 TABLET, FILM COATED ORAL AT BEDTIME
Qty: 30 TABLET | Refills: 0 | Status: SHIPPED | OUTPATIENT
Start: 2019-04-17 | End: 2019-05-14

## 2019-04-17 RX ORDER — ZIPRASIDONE HYDROCHLORIDE 20 MG/1
20 CAPSULE ORAL ONCE
Status: COMPLETED | OUTPATIENT
Start: 2019-04-17 | End: 2019-04-17

## 2019-04-17 RX ORDER — TRIFLUOPERAZINE HYDROCHLORIDE 2 MG/1
2 TABLET, FILM COATED ORAL 3 TIMES DAILY
Qty: 90 TABLET | Refills: 2 | Status: SHIPPED | OUTPATIENT
Start: 2019-04-17 | End: 2019-05-14

## 2019-04-17 RX ADMIN — ZIPRASIDONE HYDROCHLORIDE 20 MG: 20 CAPSULE ORAL at 13:02

## 2019-04-17 RX ADMIN — LORAZEPAM 1 MG: 1 TABLET ORAL at 13:33

## 2019-04-17 ASSESSMENT — ENCOUNTER SYMPTOMS
SINUS PRESSURE: 0
SORE THROAT: 0
SHORTNESS OF BREATH: 0
CONSTIPATION: 0
DIARRHEA: 0
DIZZINESS: 0
RHINORRHEA: 0
EYE DISCHARGE: 0
LIGHT-HEADEDNESS: 0
WHEEZING: 0
FEVER: 0
NAUSEA: 0
DIAPHORESIS: 0
EYE ITCHING: 0
SLEEP DISTURBANCE: 1
HEADACHES: 0
CHILLS: 0
COUGH: 0
FATIGUE: 0

## 2019-04-17 ASSESSMENT — PAIN SCALES - GENERAL: PAINLEVEL: NO PAIN (0)

## 2019-04-17 NOTE — ED NOTES
Pt requesting to leave. Discussed hold with MD; MD and RN agree that pt is not a danger to self and others. Security watch/FAVIO removed. Pt agrees to stay for DEC evaluation. Pt requesting anxiety meds.

## 2019-04-17 NOTE — ED PROVIDER NOTES
History     Chief Complaint   Patient presents with     Hallucinations     hearing voices     HPI  Ayana Prasad is a 28 year old female who presents for hallucinations.  The patient is a history of bipolar disorder and has tried multiple different medications with poor control of her symptoms.  She was recently hospitalized in [facility] for hallucinations but signed herself out without a commitment hold.  She now regrets that decision feels as if she is poorly controlled.  She has not been able to sleep since she was discharged several days ago.  She denies any suicidal ideation or homicidal ideation.  The voices she hears are getting louder, they are not telling her to hurt herself but they are becoming very disruptive to her everyday life.  She says she wants help getting control.  She denies fever, chills, headache, chest pain, difficulty breathing, abdominal pain.  She reports that she did use marijuana last evening to try to help her sleep but this did not help.  She denies other drug use.    Allergies:  Allergies   Allergen Reactions     Haldol [Haloperidol] Other (See Comments)     Makes patient very angry and anxious     Seroquel [Quetiapine] Palpitations     Spent 2 weeks in the hospital due to having seroquel, caused palpitations and QT prolongation     Zyprexa [Olanzapine] Other (See Comments)     Makes patient incredibly agitated and anxious     Adhesive Tape Hives     Percocet [Oxycodone-Acetaminophen] Nausea and Vomiting     Prednisone Other (See Comments) and Hives     Suicidal ideation     Risperidone Other (See Comments)     Droperidol Anxiety       Problem List:    Patient Active Problem List    Diagnosis Date Noted     Suicidal ideation 04/09/2019     Priority: Medium     History of methamphetamine abuse 02/25/2019     Priority: Medium     Nausea 02/25/2019     Priority: Medium     Cyst of left ovary 11/05/2018     Priority: Medium     Nephrolithiasis 08/29/2018     Priority: Medium      Class 2 obesity due to excess calories in adult 03/07/2018     Priority: Medium     Auditory hallucinations 02/26/2018     Priority: Medium     Anxiety 01/05/2018     Priority: Medium     Schizoaffective disorder, bipolar type (H) 06/30/2017     Priority: Medium     PTSD (post-traumatic stress disorder) 06/30/2017     Priority: Medium     Cauda equina syndrome with neurogenic bladder (H) 01/20/2017     Priority: Medium     Moderate episode of recurrent major depressive disorder (H) 01/17/2017     Priority: Medium     Borderline personality disorder (H) 12/27/2016     Priority: Medium     History of heroin abuse 12/27/2016     Priority: Medium     Optic neuritis 03/04/2016     Priority: Medium     From recent dx of optic neuritis w/ vision loss, and iritis. Received infusions x 4 of solumedrol at [facility]. MRI done of head as well which was normal. Spinal tap looked ok per patient.         Intractable back pain 09/20/2015     Priority: Medium     Depression 02/14/2015     Priority: Medium     Overdose 02/14/2015     Priority: Medium     Overview:   Intentional overdose October 2016 and May 2016       Cannabis dependence (H) 08/22/2013     Priority: Medium     Episodic mood disorder (H) 08/22/2013     Priority: Medium     Opioid use disorder, severe, dependence (H) 08/22/2013     Priority: Medium     Substance-induced psychotic disorder with hallucinations (H) 08/22/2013     Priority: Medium     GERD (gastroesophageal reflux disease) 07/08/2013     Priority: Medium     Tobacco abuse 07/08/2013     Priority: Medium     Marijuana abuse 05/31/2013     Priority: Medium     Daily.  Some use of MDMA and cocaine in past and recently had a 4 day break where she doesn't recall getting lost in woods.        Polysubstance abuse (H) 05/31/2013     Priority: Medium     Marijuana daily, Xanax periodically.  MDMA a couple times and cocaine. Narcotics and over the counter. Dextromethorphan with overdose 5/1/16 at [facility].   CD recommended.        Bipolar 1 disorder, manic, mild 01/13/2012     Priority: Medium     Close to meeting criteria for bipolar 1? Reji Dey.  Psychology feels bipolar may be appropriate diagnosis although subsequent evaluation by psychiatry at [place] felt depression with borderline personality.  Will return for med discussion with preference for Lithium 300 two times daily with goal 12 hour trough 0.8-1.2.  March 26, 2012 - intitiated Li and seemed to help some but stopped due to shakiness.  Lamotrigine trial as having more depression issues 25/d, up to two times daily then gradually increase to 100-200 mg daily.  Consider olazapine for thought disturbance. Has not wanted medications but used friends Xanax.  Prozac plus olazapine good next option once GI issues worked through. Patient very resistent to medications.  Continue with Reji.   May 31, 2013 - patient likely in a hypomanic/manic phase right now but no signs or symptoms of dangerous behaviors or thought processes.  Trial of olanzapine and fluoxetine. Didn't like olanzapine. Stopped as inpt for non-suicidal overdose at [facility]. Pyschiatry, psychology and continue with Prozac.  Now agreeing to take prozac and seroquel. Stopped Prozac on her own because didn't notice difference.  Trazodone didn't help. Stopped all. Possible haven?  Restart quetiapine for minor hallucinations.        ADHD (attention deficit hyperactivity disorder) 09/09/2011     Priority: Medium     Adderall ineffective.  Better on Concerta.  Still with anger issues predating medications. Declines counseling. Suggested vocational assessment.  Trial of guanfacine adjunct for anger but didn't help. Would avoid controlled substances given CD issues and impulsivity.       Abdominal pain, right upper quadrant 11/16/2010     Priority: Medium        Past Medical History:    Past Medical History:   Diagnosis Date     ADHD (attention deficit hyperactivity disorder)      Bipolar 1 disorder, manic, mild       Cauda equina syndrome      Depressive disorder      GERD (gastroesophageal reflux disease)      Marginal corneal ulcer, left 7/17/2015     Nephrolithiasis      Polysubstance abuse        Past Surgical History:    Past Surgical History:   Procedure Laterality Date     BACK SURGERY - For Cauda Equina Syndrome  12/24/2016     COLONOSCOPY       COMBINED CYSTOSCOPY, INSERT STENT URETER(S) Left 8/30/2018    Procedure: COMBINED CYSTOSCOPY, INSERT STENT URETER(S);  Cystoscopy With Left Stent Placement;  Surgeon: Kiran Ulrich MD;  Location: [facility] OR     COMBINED CYSTOSCOPY, RETROGRADES, EXCHANGE STENT URETER(S) Left 10/14/2018    Procedure: COMBINED CYSTOSCOPY, RETROGRADES, EXCHANGE STENT URETER(S);  Cystoscopy and removal of left-sided stent.;  Surgeon: Stiven Olivo MD;  Location:  OR     COMBINED CYSTOSCOPY, RETROGRADES, URETEROSCOPY, INSERT STENT  1/6/2014    Procedure: COMBINED CYSTOSCOPY, RETROGRADES, URETEROSCOPY, INSERT STENT;  Cystyoscopy place left ureteral stent;  Surgeon: Jaun Kimble MD;  Location: [facility] OR     COMBINED CYSTOSCOPY, RETROGRADES, URETEROSCOPY, INSERT STENT Left 10/23/2018    Procedure: Video cystoscopy, left ureteroscopy with stone extraction;  Surgeon: Bull Mast MD;  Location:  OR     CYSTOSCOPY, URETEROSCOPY, COMBINED Right 9/23/2015    Procedure: COMBINED CYSTOSCOPY, URETEROSCOPY;  Surgeon: ROME Jett MD;  Location: [facility] OR     ENT SURGERY       ESOPHAGOSCOPY, GASTROSCOPY, DUODENOSCOPY (EGD), COMBINED  4/8/2013    Procedure: COMBINED ESOPHAGOSCOPY, GASTROSCOPY, DUODENOSCOPY (EGD), BIOPSY SINGLE OR MULTIPLE;  Gastroscopy;  Surgeon: Peter Pickard MD;  Location: [facility] GI     ESOPHAGOSCOPY, GASTROSCOPY, DUODENOSCOPY (EGD), COMBINED Left 10/28/2014    Procedure: COMBINED ESOPHAGOSCOPY, GASTROSCOPY, DUODENOSCOPY (EGD), BIOPSY SINGLE OR MULTIPLE;  Surgeon: Narcisa Ramirez MD;  Location:  OR     ESOPHAGOSCOPY, GASTROSCOPY, DUODENOSCOPY (EGD),  COMBINED N/A 12/24/2018    Procedure: COMBINED ESOPHAGOSCOPY, GASTROSCOPY, DUODENOSCOPY (EGD), BIOPSY SINGLE OR MULTIPLE;  Surgeon: Sonu Verduzco MD;  Location: [facility] GI     LAPAROSCOPIC CHOLECYSTECTOMY  11/20/2014    [facility], Dr. Ramirez     LASER HOLMIUM LITHOTRIPSY URETER(S), INSERT STENT, COMBINED  4/2/2014    Procedure: COMBINED CYSTOSCOPY, URETEROSCOPY, LASER HOLMIUM LITHOTRIPSY URETER(S), INSERT STENT;  Cystoscopy,Left Ureteral Stent Removal,Left Ureteroscopy with Laser Lithotripsy,Left Ureter Stent Placement;  Surgeon: ROME Jett MD;  Location: [facility] OR     Transurethral stone resection  03/11/2011       Family History:    Family History   Problem Relation Age of Onset     Gastrointestinal Disease Father         Crohn's Disease     Cancer Father         Liver Cancer     Other Cancer Father         Liver     Cancer Maternal Grandmother         lympoma     Diabetes Maternal Grandmother      Mental Illness Maternal Grandmother      Other Cancer Maternal Grandmother         Non Hodgkins Lymphoma     Diabetes Maternal Grandfather      Hypertension Maternal Grandfather      Prostate Cancer Maternal Grandfather      Hyperlipidemia Maternal Grandfather      Heart Disease Paternal Grandfather         heart disease     Hypertension Brother      Other Cancer Brother         Esophegeal cancer     Diabetes Brother      Hyperlipidemia Mother      Mental Illness Mother      Anxiety Disorder Mother      Anesthesia Reaction Mother      Hypertension Brother      Other Cancer Brother      Other Cancer Paternal Half-Brother         Esophageal       Social History:  Marital Status:   [2]  Social History     Tobacco Use     Smoking status: Former Smoker     Packs/day: 0.50     Years: 5.00     Pack years: 2.50     Types: Other     Start date: 8/1/2011     Smokeless tobacco: Current User     Types: Chew     Tobacco comment: Smokes E-cig   Substance Use Topics     Alcohol use: No     Alcohol/week: 0.0 oz     Drug  use: Yes     Types: Marijuana     Comment: past use heroin and cocaine        Medications:      eszopiclone (LUNESTA) 3 MG tablet   norelgestromin-ethinyl estradiol (ORTHO EVRA) 150-35 MCG/24HR patch   ondansetron (ZOFRAN) 4 MG tablet   traZODone (DESYREL) 50 MG tablet   trifluoperazine (STELAZINE) 2 MG tablet         Review of Systems  Pertinent positives and negatives listed in the HPI, all other systems reviewed and are negative.    Physical Exam   BP: (!) 140/94  Pulse: 99  Resp: 18  SpO2: 100 %      Physical Exam   Constitutional: She is oriented to person, place, and time. She appears well-developed and well-nourished. She appears distressed.   HENT:   Head: Normocephalic and atraumatic.   Right Ear: External ear normal.   Left Ear: External ear normal.   Nose: Nose normal.   Eyes: Conjunctivae are normal. No scleral icterus.   Neck: Normal range of motion.   Cardiovascular: Normal rate and regular rhythm.   Pulmonary/Chest: Effort normal. No stridor. No respiratory distress.   Abdominal: Soft. She exhibits no distension.   Neurological: She is alert and oriented to person, place, and time.   Skin: Skin is warm and dry. She is not diaphoretic.   Psychiatric: Her behavior is normal. Her mood appears anxious. Her affect is not angry. Her speech is not slurred. She expresses no homicidal and no suicidal ideation.   Nursing note and vitals reviewed.      ED Course        Procedures               Critical Care time:  none               No results found for this or any previous visit (from the past 24 hour(s)).    Medications   ziprasidone (GEODON) capsule 20 mg (20 mg Oral Given 4/17/19 1302)   LORazepam (ATIVAN) tablet 1 mg (1 mg Oral Given 4/17/19 1333)       Assessments & Plan (with Medical Decision Making)   28-year-old female with a long history of poorly controlled bipolar disorder who presents for hallucinations asking for help.  She is not currently on a hold she denies suicidal or homicidal ideation.  She  is here voluntarily.  She is given oral ziprasidone and lorazepam to help her relax and help with her anxiety and symptoms.  Urine pregnancy test and urine drug screen are currently pending.  She is awaiting evaluation by DEC.  She is signed out to Dr. Arvizu at change of shift.    I have reviewed the nursing notes.    I have reviewed the findings, diagnosis, plan and need for follow up with the patient.          Medication List      There are no discharge medications for this visit.         Final diagnoses:   Hallucinations       4/17/2019   [facility] EMERGENCY DEPARTMENT     Jackson Bee MD  04/17/19 1542

## 2019-04-17 NOTE — ED NOTES
Pt cooperative, pacing back and forth in the room. Security observation initiated. Pt refused behavioral scrubs.

## 2019-04-17 NOTE — TELEPHONE ENCOUNTER
Prior Authorization Approval    Authorization Effective Date: 1/16/2019  Authorization Expiration Date: 4/16/2020  Medication: Trifluoperazine 2mg  Approved Dose/Quantity: 90  Reference #: [id number]   Insurance Company: [employer] [place] - Phone [phone] Fax [phone]  Which Pharmacy is filling the prescription (Not needed for infusion/clinic administered): [facility] - [place], [place] - [address]  Pharmacy Notified: Yes but patient is restricted to a certain doctor and they will take it from here to get her restricted doc to sign off on it. Will route to clinic as well.

## 2019-04-17 NOTE — ED NOTES
Bed: ED05  Expected date: 4/17/19  Expected time: 12:04 PM  Means of arrival: Ambulance  Comments:  EMS - hallucination

## 2019-04-17 NOTE — PROGRESS NOTES
SUBJECTIVE:   Ayana Prasad is a 28 year old female who presents to clinic today for the following   health issues:          Hospital Follow-up Visit:    Hospital/Nursing Home/IP Rehab Facility: [facility]  Date of Admission: 4/9/19  Date of Discharge: 4/15/19  Reason(s) for Admission: suicidal ideations            Problems taking medications regularly:  Prescribed trifluoperazine 2 mg and zolpidem 10 mg at discharge. Did not  trifluoperazine from pharmacy because it isn't covered by her insurance.        Medication changes since discharge: None       Problems adhering to non-medication therapy: Insurance didn't cover trifluoperazine  Summary of hospitalization:  [facility] discharge summary reviewed  Diagnostic Tests/Treatments reviewed.  Follow up needed: none  Other Healthcare Providers Involved in Patient s Care:         None  Update since discharge: worse     Post Discharge Medication Reconciliation: discharge medications reconciled, continue medications without change.  Plan of care communicated with patient     Coding guidelines for this visit:  Type of Medical   Decision Making Face-to-Face Visit       within 7 Days of discharge Face-to-Face Visit        within 14 days of discharge   Moderate Complexity [id number]   High Complexity 27056 [id number]            Additional history: as documented    Reviewed  and updated as needed this visit by clinical staff  Tobacco  Allergies  Meds  Med Hx  Surg Hx  Fam Hx  Soc Hx        Reviewed and updated as needed this visit by Provider         Current Outpatient Medications   Medication Sig Dispense Refill     eszopiclone (LUNESTA) 3 MG tablet Take 1 tablet (3 mg) by mouth At Bedtime 30 tablet 0     norelgestromin-ethinyl estradiol (ORTHO EVRA) 150-35 MCG/24HR patch Remove old patch and apply new patch onto the skin once a week for 3 weeks (21 days). Ok to wear consistently 12 patch 3     traZODone (DESYREL) 50 MG tablet Take 50  mg by mouth nightly as needed for sleep (May repeat in 30 minutes if ineffective)        trifluoperazine (STELAZINE) 2 MG tablet Take 1 tablet (2 mg) by mouth 3 times daily 90 tablet 2     ondansetron (ZOFRAN) 4 MG tablet Take 1 tablet (4 mg) by mouth every 6 hours as needed for nausea (Patient not taking: Reported on 4/17/2019) 21 tablet 3     Allergies   Allergen Reactions     Haldol [Haloperidol] Other (See Comments)     Makes patient very angry and anxious     Seroquel [Quetiapine] Palpitations     Spent 2 weeks in the hospital due to having seroquel, caused palpitations and QT prolongation     Zyprexa [Olanzapine] Other (See Comments)     Makes patient incredibly agitated and anxious     Adhesive Tape Hives     Percocet [Oxycodone-Acetaminophen] Nausea and Vomiting     Prednisone Other (See Comments) and Hives     Suicidal ideation     Risperidone Other (See Comments)     Droperidol Anxiety       Here today for hospital follow-up.  Does not feel any better since left the hospital.  Is still hearing voices.  Voices are telling her to hurt herself.  Ayana states she is not sure that she will make it through the end of the day.  Last time that slept was on Monday night.  Voices are preventing him from sleeping.  Less sleep that gets voices are getting louder.  Unsure if will be able to keep the voices quiet and not follow through with what they are telling her to do.  Was not able to take medication prescribed at discharge due to needing to be prescribed by myself.  Also, medication needed prior authorization from insurance company.  [facility] here in [place] did complete prior authorization and prescription is ready for pickup today.  However, Ayana admits that medication had not been fully effective.  Ayana admits to not being completely truthful with hospital staff and also during court hearing.  Wanted to be discharged but looking back knows that should have stayed in the hospital.    Numbness to  legs is getting less. Feels like legs are getting stronger. Is using exercises that learned in PHYSICAL THERAPY .     ROS:  Review of Systems   Constitutional: Negative for chills, diaphoresis, fatigue and fever.   HENT: Negative for ear discharge, ear pain, hearing loss, rhinorrhea, sinus pressure and sore throat.    Eyes: Negative for discharge and itching.   Respiratory: Negative for cough, shortness of breath and wheezing.    Gastrointestinal: Negative for constipation, diarrhea and nausea.   Skin: Negative for rash.   Neurological: Negative for dizziness, light-headedness and headaches.   Psychiatric/Behavioral: Positive for sleep disturbance (no sleep).        Hearing voices, telling her to harm self         OBJECTIVE:     /74 (BP Location: Left arm, Patient Position: Sitting, Cuff Size: Adult Regular)   Pulse 92   Temp 98.2  F (36.8  C) (Tympanic)   Resp 16   LMP 04/17/2019   There is no height or weight on file to calculate BMI.  Physical Exam  No PE completed today, visit was 100% discussion     ASSESSMENT/PLAN:   1. Bipolar 1 disorder, manic, mild    - trifluoperazine (STELAZINE) 2 MG tablet; Take 1 tablet (2 mg) by mouth 3 times daily  Dispense: 90 tablet; Refill: 2  - MENTAL HEALTH REFERRAL  - Adult; Psychiatry and Medication Management; Psychiatry; [facility]: Psychiatry Clinic [phone]; We will contact you to schedule the appointment or please call with any questions  - MENTAL HEALTH REFERRAL  - Adult; Outpatient Treatment; Individual/Couples/Family/Group Therapy/Health Psychology; [facility]: [facility] [phone]; We will contact you to schedule the appointment or please call with any questions    2. Auditory hallucination    - MENTAL HEALTH REFERRAL  - Adult; Psychiatry and Medication Management; Psychiatry; [facility]: Psychiatry Clinic [phone]; We will contact you to schedule the appointment or please call with any questions  - MENTAL HEALTH REFERRAL  - Adult; Outpatient  Treatment; Individual/Couples/Family/Group Therapy/Health Psychology; G: [facility] [phone]; We will contact you to schedule the appointment or please call with any questions    3. Cyst of skin    - ORTHO  REFERRAL    4. Sleep disturbances    - eszopiclone (LUNESTA) 3 MG tablet; Take 1 tablet (3 mg) by mouth At Bedtime  Dispense: 30 tablet; Refill: 0      Discussion held with Ayana and informed of my concern regarding hearing voices.  Unsure how long will be able to ignore them.  Made statement regarding not sure if it is going to make it through the end of the day.  Is agreeable to going back to the hospital.  Ambulance called.  Concerned that if left clinic may follow through with what voices are telling her to do.  Therefore, needs supervision.  Paramedic in law enforcement present, Ayana is cooperative.  Ayana gives a verbal okay for me to call and speak with her wife Sergio.  Sergio informed of situation and is grateful.  Encouraged Ayana to be truthful and honest with staff that is trying to help her.    During this visit greater than 100% of the 48 minutes for this appointment was spent in counseling and/or coordinating care of above stated issues.       [facility]

## 2019-04-17 NOTE — TELEPHONE ENCOUNTER
RECORDS RECEIVED FROM: ED F/U R HAND   DATE RECEIVED: Apr 26, 2019    NOTES STATUS DETAILS   OFFICE NOTE from referring provider N/A    OFFICE NOTE from other specialist N/A    DISCHARGE SUMMARY from hospital Internal 4/9/19, 04/17/19    DISCHARGE REPORT from the ER Internal 4/7/19   OPERATIVE REPORT N/A    MEDICATION LIST Internal    IMPLANT RECORD/STICKER N/A    LABS     CBC/DIFF N/A    CULTURES N/A    INJECTIONS DONE IN RADIOLOGY N/A    MRI N/A    CT SCAN N/A    XRAYS (IMAGES & REPORTS) Internal 4/9/19, 4/7/19   TUMOR     PATHOLOGY  Slides & report N/A

## 2019-04-17 NOTE — ED NOTES
Pt again requesting to leave AMA; form signed, MD informed. Pt is alert, oriented. Pt is calm and reports some improvement after the meds she got today, does not want to wait any longer for DEC.

## 2019-04-17 NOTE — ED AVS SNAPSHOT
[facility]  [address] [address]  Phone:  [phone]  Fax:  [phone]                                    Ayana Prasad   MRN: [id number]    Department:  [facility]   Date of Visit:  4/17/2019           After Visit Summary Signature Page    I have received my discharge instructions, and my questions have been answered. I have discussed any challenges I see with this plan with the nurse or doctor.    ..........................................................................................................................................  Patient/Patient Representative Signature      ..........................................................................................................................................  Patient Representative Print Name and Relationship to Patient    ..................................................               ................................................  Date                                   Time    ..........................................................................................................................................  Reviewed by Signature/Title    ...................................................              ..............................................  Date                                               Time          22EPIC Rev 08/18

## 2019-04-18 ENCOUNTER — TELEPHONE (OUTPATIENT)
Dept: PSYCHIATRY | Facility: CLINIC | Age: 29
End: 2019-04-18

## 2019-04-18 DIAGNOSIS — F41.9 ANXIETY: Primary | ICD-10-CM

## 2019-04-18 RX ORDER — LORAZEPAM 0.5 MG/1
0.5 TABLET ORAL EVERY 8 HOURS PRN
Qty: 30 TABLET | Refills: 0 | Status: SHIPPED | OUTPATIENT
Start: 2019-04-18 | End: 2019-04-28

## 2019-04-18 NOTE — TELEPHONE ENCOUNTER
Discussed with the pharmacy.    Note PA not initiated by the PCP clinic.    Ham Patiño RT (r)  [facility]

## 2019-04-18 NOTE — TELEPHONE ENCOUNTER
Is the patient between the ages of 15-40? Yes  Is the patient experiencing or recently experienced symptoms of psychosis? Yes since early 2017  How long has pt been taking anti-psychotics? Less than one week, prescribed while inpatient      PSYCHIATRY CLINIC PHONE INTAKE     SERVICES REQUESTED / INTERESTED IN          Med Management    Presenting Problem and Brief History                              What would you like to be seen for? (brief description):  Diagnosed in early 2017. Auditory hallucinations, sometimes won't sleep for days at a time, anxiety, lose track of time. Hallucinations are all the time - tell her to do things.   Have you received a mental health diagnosis? Yes   Which one (s): Schizoaffetive  Is there any history of developmental delay?  No   Are you currently seeing a mental health provider?  Yes            Who / month last seen:  Psych last seen  Do you have mental health records elsewhere?  Yes  Will you sign a release so we can obtain them?  Yes    Have you ever been hospitalized for psychiatric reasons?  Yes  Describe:  [facility] for one week - no sleep, bad hallucinations, jumped out of a car (listening to voices)    Do you have current thoughts of self-harm?  No    Do you currently have thoughts of harming others?  No       Substance Use History     Do you have any history of alcohol / illicit drug use?  Yes  Describe:  History of drug use - marijuana, heroin, cocaine. Last used 3 years ago  Have you ever received treatment for this?  Yes    Describe:  Twice outpatient at [facility], Inpatient at [facility]     Social History     Does the patient have a guardian?  No    Name / number:   Have you had an ACT team in last 12 months?  No  Describe:    Do you have any current or past legal issues?  Yes  Describe: 2014 - Disorderly conduct, no longer on probation. Civil commitment last year  OK to leave a detailed voicemail?  Yes    Medical/ Surgical History                                    Patient Active Problem List   Diagnosis     ADHD (attention deficit hyperactivity disorder)     Bipolar 1 disorder, manic, mild     Marijuana abuse     Polysubstance abuse (H)     GERD (gastroesophageal reflux disease)     Tobacco abuse     Abdominal pain, right upper quadrant     Intractable back pain     Optic neuritis     Cauda equina syndrome with neurogenic bladder (H)     Schizoaffective disorder, bipolar type (H)     PTSD (post-traumatic stress disorder)     Anxiety     Auditory hallucinations     Class 2 obesity due to excess calories in adult     Nephrolithiasis     Cyst of left ovary     Borderline personality disorder (H)     Cannabis dependence (H)     Depression     Episodic mood disorder (H)     History of heroin abuse     Moderate episode of recurrent major depressive disorder (H)     Opioid use disorder, severe, dependence (H)     Substance-induced psychotic disorder with hallucinations (H)     History of methamphetamine abuse     Nausea     Overdose     Suicidal ideation          Medications             Current Outpatient Medications   Medication Sig Dispense Refill     eszopiclone (LUNESTA) 3 MG tablet Take 1 tablet (3 mg) by mouth At Bedtime 30 tablet 0     LORazepam (ATIVAN) 0.5 MG tablet Take 1 tablet (0.5 mg) by mouth every 8 hours as needed for anxiety No more than 3 tablets/day.  Medications to be dispensed by wife, Sergio. 30 tablet 0     norelgestromin-ethinyl estradiol (ORTHO EVRA) 150-35 MCG/24HR patch Remove old patch and apply new patch onto the skin once a week for 3 weeks (21 days). Ok to wear consistently 12 patch 3     ondansetron (ZOFRAN) 4 MG tablet Take 1 tablet (4 mg) by mouth every 6 hours as needed for nausea (Patient not taking: Reported on 4/17/2019) 21 tablet 3     traZODone (DESYREL) 50 MG tablet Take 50 mg by mouth nightly as needed for sleep (May repeat in 30 minutes if ineffective)        trifluoperazine (STELAZINE) 2 MG tablet Take 1 tablet (2 mg) by mouth 3  times daily 90 tablet 2     Stelazine - since inpatient last week  Ativan  lunesta    DISPOSITION      Completed phone screen with patient. Scheduled with [facility].    Radha Ibarra,

## 2019-04-23 ENCOUNTER — OFFICE VISIT (OUTPATIENT)
Dept: PSYCHOLOGY | Facility: CLINIC | Age: 29
End: 2019-04-23
Payer: COMMERCIAL

## 2019-04-23 DIAGNOSIS — F25.0 SCHIZOAFFECTIVE DISORDER, BIPOLAR TYPE (H): Primary | ICD-10-CM

## 2019-04-23 PROCEDURE — 90834 PSYTX W PT 45 MINUTES: CPT | Performed by: COUNSELOR

## 2019-04-23 ASSESSMENT — ANXIETY QUESTIONNAIRES
GAD7 TOTAL SCORE: 15
6. BECOMING EASILY ANNOYED OR IRRITABLE: NOT AT ALL
2. NOT BEING ABLE TO STOP OR CONTROL WORRYING: NEARLY EVERY DAY
IF YOU CHECKED OFF ANY PROBLEMS ON THIS QUESTIONNAIRE, HOW DIFFICULT HAVE THESE PROBLEMS MADE IT FOR YOU TO DO YOUR WORK, TAKE CARE OF THINGS AT HOME, OR GET ALONG WITH OTHER PEOPLE: VERY DIFFICULT
5. BEING SO RESTLESS THAT IT IS HARD TO SIT STILL: NEARLY EVERY DAY
3. WORRYING TOO MUCH ABOUT DIFFERENT THINGS: NEARLY EVERY DAY
7. FEELING AFRAID AS IF SOMETHING AWFUL MIGHT HAPPEN: NOT AT ALL
1. FEELING NERVOUS, ANXIOUS, OR ON EDGE: NEARLY EVERY DAY

## 2019-04-23 ASSESSMENT — PATIENT HEALTH QUESTIONNAIRE - PHQ9
5. POOR APPETITE OR OVEREATING: NEARLY EVERY DAY
SUM OF ALL RESPONSES TO PHQ QUESTIONS 1-9: 0

## 2019-04-23 NOTE — PROGRESS NOTES
Progress Note - Initial Session    Client Name:  Ayana Prasad Date: 4/23/19         Service Type: Individual  Video Visit: No     Session Start Time: 9:00 am  Session End Time: 9:45 am     Session Length: 45 min    Session #: 1    Attendees: Client attended alone     DATA:  Diagnostic Assessment in progress.  Unable to complete documentation at the conclusion of the first session due to client arriving late and extra time being dedicated to paperwork completion.    Client reports attending session due to hospital scheduling this following being held for haven and hallucinations. Client     Interactive Complexity: No  Crisis: No    Intervention:  CBT: Utilized open-ended questions and validation to gather appropriate information. Assessed for safety.    ASSESSMENT:  Mental Status Assessment:  Appearance:   Appropriate   Eye Contact:   Poor  Psychomotor Behavior: Normal   Attitude:   Cooperative   Orientation:   All  Speech   Rate / Production: Monotone    Volume:  Normal   Mood:    Normal  Affect:    Flat   Thought Content:  Clear   Thought Form:  Coherent  Logical   Insight:    Good       Safety Issues and Plan for Safety and Risk Management:  Client denies current fears or concerns for personal safety.  Client denies current or recent suicidal ideation or behaviors.  Client denies current or recent homicidal ideation or behaviors.  Client denies current or recent self injurious behavior or ideation.  Client denies other safety concerns.  A safety and risk management plan has not been developed at this time, however client was given the after-hours number / 911 should there be a change in any of these risk factors.      Diagnostic Criteria:  Client reports history of Haven symptoms, occurring yearly in April for the past 8 years. Client notes anxiety levels of daily, compulsive cleaning, and command auditory hallucinations she reports experiencing daily that tell her to harm herself. Client denies  intent to act on these commands. Client reports history of diagnosis of Schizotypal, Bipolar type which collateral documentation confirms. Additional assessment needed.      DSM5 Diagnoses: (Sustained by DSM5 Criteria Listed Above)  Diagnoses: (F52.0) Schizoaffective disorder, bipolar type   Psychosocial & Contextual Factors: Past trauma  WHODAS 2.0 (12 item)            This questionnaire asks about difficulties due to health conditions. Health conditions  include  disease or illnesses, other health problems that may be short or long lasting,  injuries, mental health or emotional problems, and problems with alcohol or drugs.                     Think back over the past 30 days and answer these questions, thinking about how much  difficulty you had doing the following activities. For each question, please [place] only  one response.    S1 Standing for long periods such as 30 minutes? None =         1   S2 Taking care of household responsibilities? None =         1   S3 Learning a new task, for example, learning how to get to a new place? None =         1   S4 How much of a problem do you have joining community activities (for example, festivals, Religion or other activities) in the same way as anyone else can? None =         1   S5 How much have you been emotionally affected by your health problems? None =         1     In the past 30 days, how much difficulty did you have in:   S6 Concentrating on doing something for ten minutes? Moderate =   3   S7 Walking a long distance such as a kilometer (or equivalent)? None =         1   S8 Washing your whole body? None =         1   S9 Getting dressed? None =         1   S10 Dealing with people you do not know? Moderate =   3   S11 Maintaining a friendship? None =         1   S12 Your day to day work? Severe =       4     H1 Overall, in the past 30 days, how many days were these difficulties present? Record number of days 30   H2 In the past 30 days, for how many days were  you totally unable to carry out your usual activities or work because of any health condition? Record number of days  30   H3 In the past 30 days, not counting the days that you were totally unable, for how many days did you cut back or reduce your usual activities or work because of any health condition? Record number of days 30       Collateral Reports Completed:  Routed note to PCP      PLAN: (Homework, other):  Follow-up appointment scheduled to complete Diagnostic Interview. Client is encouraged to follow up with [facility] for psychiatry and alternative services. Plan to coordinate with them to assess for appropriateness of client shifting individual therapy services to this program.      Little Yun MA, [facility]

## 2019-04-23 NOTE — Clinical Note
Ayana Wells showed today, however appears somewhat resistant to services. She has an appointment with [facility] next week, which I will likely transfer her care to. She is a very complex case, and I think needs a higher level of care and more specific resources to address her command hallucinations than I can give her. I'll be reaching out to [facility] and will keep you posted. Let me know if you have any additional thoughts.Thanks!

## 2019-04-23 NOTE — Clinical Note
Reggie Macias,You have an intake scheduled with this client next Tuesday. Her follow-up intake appointment with me is the following day. Just scratching the surface with this client, she appears to need much more than I can offer. Do you know if [facility] is accepting Individual Therapy clients? If so, I think it would be best for her to transition care to providers in your office who have more experience with her symptoms. If possible, I'd love to consult briefly with you in following your appointment with her. Let me know if that would be an option.Thanks so much for your time![facility]

## 2019-04-24 ASSESSMENT — ANXIETY QUESTIONNAIRES: GAD7 TOTAL SCORE: 15

## 2019-04-26 ENCOUNTER — HOSPITAL ENCOUNTER (OUTPATIENT)
Facility: AMBULATORY SURGERY CENTER | Age: 29
End: 2019-04-26
Attending: ORTHOPAEDIC SURGERY
Payer: COMMERCIAL

## 2019-04-26 ENCOUNTER — OFFICE VISIT (OUTPATIENT)
Dept: ORTHOPEDICS | Facility: CLINIC | Age: 29
End: 2019-04-26
Payer: COMMERCIAL

## 2019-04-26 ENCOUNTER — PRE VISIT (OUTPATIENT)
Dept: ORTHOPEDICS | Facility: CLINIC | Age: 29
End: 2019-04-26

## 2019-04-26 VITALS — WEIGHT: 165.2 LBS | BODY MASS INDEX: 25.93 KG/M2 | HEIGHT: 67 IN

## 2019-04-26 DIAGNOSIS — M67.40 GANGLION CYST OF FLEXOR TENDON SHEATH: Primary | ICD-10-CM

## 2019-04-26 ASSESSMENT — ENCOUNTER SYMPTOMS
PANIC: 0
DIFFICULTY URINATING: 0
JOINT SWELLING: 0
CHILLS: 0
STIFFNESS: 0
DECREASED CONCENTRATION: 1
FLANK PAIN: 1
MYALGIAS: 0
DYSURIA: 0
HALLUCINATIONS: 1
MUSCLE WEAKNESS: 1
ALTERED TEMPERATURE REGULATION: 0
NECK PAIN: 0
NERVOUS/ANXIOUS: 1
WEIGHT LOSS: 0
ARTHRALGIAS: 0
INSOMNIA: 1
FEVER: 0
NIGHT SWEATS: 0
POLYDIPSIA: 0
FATIGUE: 0
BACK PAIN: 1
MUSCLE CRAMPS: 0
DECREASED APPETITE: 0
WEIGHT GAIN: 0
INCREASED ENERGY: 0
DEPRESSION: 0
HEMATURIA: 0
POLYPHAGIA: 0

## 2019-04-26 ASSESSMENT — MIFFLIN-ST. JEOR: SCORE: 1511.97

## 2019-04-26 NOTE — NURSING NOTE
"Reason For Visit:   Chief Complaint   Patient presents with     Right Hand - Eval/Assessment     Consult     Right hand, pt has a hard and sore spot        Primary MD: Becki Avery  Ref. MD: ED Follow up     Age: 28 year old    ?  No      Ht 1.702 m (5' 7\")   Wt 74.9 kg (165 lb 3.2 oz)   LMP 04/17/2019   BMI 25.87 kg/m        Pain Assessment  Patient Currently in Pain: Yes  0-10 Pain Scale: 4  Primary Pain Location: Hand  Pain Descriptors: Dull, Other (comment)(Weak )    Hand Dominance Evaluation  Hand Dominance: Right          QuickDASH Assessment  No flowsheet data found.       Current Outpatient Medications   Medication Sig Dispense Refill     eszopiclone (LUNESTA) 3 MG tablet Take 1 tablet (3 mg) by mouth At Bedtime 30 tablet 0     LORazepam (ATIVAN) 0.5 MG tablet Take 1 tablet (0.5 mg) by mouth every 8 hours as needed for anxiety No more than 3 tablets/day.  Medications to be dispensed by wife, Sergio. 30 tablet 0     norelgestromin-ethinyl estradiol (ORTHO EVRA) 150-35 MCG/24HR patch Remove old patch and apply new patch onto the skin once a week for 3 weeks (21 days). Ok to wear consistently 12 patch 3     ondansetron (ZOFRAN) 4 MG tablet Take 1 tablet (4 mg) by mouth every 6 hours as needed for nausea (Patient not taking: Reported on 4/17/2019) 21 tablet 3     traZODone (DESYREL) 50 MG tablet Take 50 mg by mouth nightly as needed for sleep (May repeat in 30 minutes if ineffective)        trifluoperazine (STELAZINE) 2 MG tablet Take 1 tablet (2 mg) by mouth 3 times daily 90 tablet 2       Allergies   Allergen Reactions     Haldol [Haloperidol] Other (See Comments)     Makes patient very angry and anxious     Seroquel [Quetiapine] Palpitations     Spent 2 weeks in the hospital due to having seroquel, caused palpitations and QT prolongation     Zyprexa [Olanzapine] Other (See Comments)     Makes patient incredibly agitated and anxious     Adhesive Tape Hives     Percocet " [Oxycodone-Acetaminophen] Nausea and Vomiting     Prednisone Other (See Comments) and Hives     Suicidal ideation     Risperidone Other (See Comments)     Droperidol Anxiety       Shane Gibbs, CMA
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
## 2019-04-26 NOTE — NURSING NOTE
Teaching Flowsheet   Relevant Diagnosis: Right ring finger flexor tendon sheath ganglion  Teaching Topic: Pre-op for right ring finger flexor tendon sheath ganglion excision - scheduled 06/14/19 at [facility]     Person(s) involved in teaching:   Patient     Motivation Level:  Asks Questions: Yes  Eager to Learn: Yes  Cooperative: Yes  Receptive (willing/able to accept information): Yes  Any cultural factors/Adventist beliefs that may influence understanding or compliance? No     Patient demonstrates understanding of the following:  Reason for the appointment, diagnosis and treatment plan: Yes  Knowledge of proper use of medications and conditions for which they are ordered (with special attention to potential side effects or drug interactions): Yes  Which situations necessitate calling provider and whom to contact: Yes     Teaching Concerns Addressed:   Pre-op H&P with primary provider at [facility]  Wife will provide transportation  Aware of post-op expectations     Nutritional needs and diet plan: Yes  Pain management techniques: Yes  Wound Care: Yes  How and/when to access community resources: Yes     Instructional Materials Used/Given: Pre-op packet, surgical soap     Time spent with patient: 12.

## 2019-04-28 ENCOUNTER — MYC REFILL (OUTPATIENT)
Dept: FAMILY MEDICINE | Facility: CLINIC | Age: 29
End: 2019-04-28

## 2019-04-28 DIAGNOSIS — F41.9 ANXIETY: ICD-10-CM

## 2019-04-29 RX ORDER — LORAZEPAM 0.5 MG/1
0.5 TABLET ORAL 2 TIMES DAILY PRN
Qty: 30 TABLET | Refills: 0 | Status: SHIPPED | OUTPATIENT
Start: 2019-04-29 | End: 2019-05-14

## 2019-04-30 ENCOUNTER — OFFICE VISIT (OUTPATIENT)
Dept: PSYCHIATRY | Facility: CLINIC | Age: 29
End: 2019-04-30
Payer: COMMERCIAL

## 2019-04-30 ENCOUNTER — TELEPHONE (OUTPATIENT)
Dept: PSYCHIATRY | Facility: CLINIC | Age: 29
End: 2019-04-30

## 2019-04-30 DIAGNOSIS — F25.0 SCHIZOAFFECTIVE DISORDER, BIPOLAR TYPE (H): Primary | ICD-10-CM

## 2019-04-30 ASSESSMENT — PATIENT HEALTH QUESTIONNAIRE - PHQ9
5. POOR APPETITE OR OVEREATING: NEARLY EVERY DAY
SUM OF ALL RESPONSES TO PHQ QUESTIONS 1-9: 4

## 2019-04-30 ASSESSMENT — ANXIETY QUESTIONNAIRES
5. BEING SO RESTLESS THAT IT IS HARD TO SIT STILL: NEARLY EVERY DAY
3. WORRYING TOO MUCH ABOUT DIFFERENT THINGS: NEARLY EVERY DAY
GAD7 TOTAL SCORE: 17
2. NOT BEING ABLE TO STOP OR CONTROL WORRYING: NEARLY EVERY DAY
7. FEELING AFRAID AS IF SOMETHING AWFUL MIGHT HAPPEN: SEVERAL DAYS
6. BECOMING EASILY ANNOYED OR IRRITABLE: SEVERAL DAYS
1. FEELING NERVOUS, ANXIOUS, OR ON EDGE: NEARLY EVERY DAY

## 2019-05-01 ENCOUNTER — OFFICE VISIT (OUTPATIENT)
Dept: PSYCHOLOGY | Facility: CLINIC | Age: 29
End: 2019-05-01
Payer: COMMERCIAL

## 2019-05-01 DIAGNOSIS — F25.0 SCHIZOAFFECTIVE DISORDER, BIPOLAR TYPE (H): Primary | ICD-10-CM

## 2019-05-01 DIAGNOSIS — R79.89 ELEVATED VITAMIN B12 LEVEL: ICD-10-CM

## 2019-05-01 LAB — VIT B12 SERPL-MCNC: 635 PG/ML (ref 193–986)

## 2019-05-01 PROCEDURE — 36415 COLL VENOUS BLD VENIPUNCTURE: CPT | Performed by: NURSE PRACTITIONER

## 2019-05-01 PROCEDURE — 90791 PSYCH DIAGNOSTIC EVALUATION: CPT | Performed by: COUNSELOR

## 2019-05-01 PROCEDURE — 82607 VITAMIN B-12: CPT | Performed by: NURSE PRACTITIONER

## 2019-05-01 ASSESSMENT — ANXIETY QUESTIONNAIRES: GAD7 TOTAL SCORE: 17

## 2019-05-01 NOTE — Clinical Note
Jarad Connell,We completed Ayana's DA, and I've been in contact with the psychiatrist that completed her intake with the [facility] program. Once collateral records are received, I am likely going to refer Ayana to services within the [facility] program for therapy. She would likely benefit from a more collaborative team approach given her complexity of symptoms and history of limited engagement leading to symptom relapse.Let me know if you have any questions or concerns. Thanks!
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
## 2019-05-08 NOTE — PROGRESS NOTES
"[facility] NAVIGATE Clinical Assessment of Need  A Part of the [facility] First Episode of Psychosis Program    Patient: Ayana Prasad (1990, 28 year old)     MRN: [id number]  Date:  4/30/19  Clinician: MONIQUE Sung     Length of Actual Contact: Start Time: 9am; End Time: 10am  Location of Contact:  [facility], [facility]  People present:  Writer, Client    Reviewed limits to confidentiality, Yes     Chief Complaint     Just got out of inpatient. I was doing weird things. I was hearing voices.     History of Presenting Illness    Modified [place] Symptom Index completed (see below)    Per patient:    Writer met with Ayana on this date for FEP Screening. Ayana was pleasant, cooperative and slightly guarded throughout the session.     Ayana shared that she was recently inpatient at [facility]. She reports symptoms have improved since being discharged. With regard to symptoms of psychosis, Ayana reports experiencing auditory hallucinations since her late teenage years. She describes these as multiple voices, unknown to her, that she can understand. She reports experiencing command hallucinations \"lately every day\" to harm herself, which is what prompted her to go to the hospital on 4/9/19. She reports these command hallucinations are \"better now after inpatient, they are quieter.\" She reports, \"the medicine is helping.\" Ayana denied the presence of any command hallucinations today. Ayana denies experiencing command hallucinations that are violent or homicidal in nature. Ayana denies HI/VI outside of voices and denies SI outside of voices. Talked through safety plan to include reaching out to her wife-Sergio, utilizing crisis resources (provided by writer and reviewed with Ayana), and going to an ED for evaluation if safety concerns become imminent. Ayana was able to contract for safety.     Ayana reports, \"I just act weird because the voices are yelling at me.\" She reports being unable to sleep due " "to anxiety from the voices at times. She also shared examples of the voices \"yelling at me to run head first into a wall,\" and shared, \"I jumped [place] [address],\" due to the voices telling Ayana to do so. Ayana reports she was not sleeping much prior to this past hospitalization.     Ayana reports experiencing visual hallucinations (could not recall specifics), \"only when I'm awake for a couple of days.\" Ayana shared this doesn't happen very often, but typically 2-3x/year Ayana has trouble sleeping that lasts for approximately 4-6 days. During this time she reports she \"doesn't sleep\" and \"walks a lot.\" The last time this happened was about 1-2 weeks ago, before the hospital. Her wife-Sergio shared Ayana reported she was \"seeing monkeys\" during that time; Ayana does not remember this.     She denies substance use prior to hospital. Ayana has a history of substance use including heroin addiction from [id number]. She reportedly got sober following treatment at [facility]. With regard to other substances used, Ayana stated, \"I've used pretty much everything you can use at least once.\" She reports previous heroin overdoses on April 29, 30 and May 1 of 2016. Ayana reports current substance use to include sometimes smoking weed maybe 1x/month and a recent \"binge\" on cocaine for a couple of days \"a month or two ago.\"    Ayana reports experiencing changes in thinking the last month or two including feeling her thoughts are slower. She additionally shared, \"I don't remember a whole lot, don't know what is going on.\" She denies experiencing paranoia currently. She reports a general distrust of people and described having \"an uneasy feeling\" around people. She also reports, \"I feel like my wife is cheating all the time.\" She denies experiencing ideas of reference.     Ayana identifies her most distressing symptoms presently to include \"anxiety and voices, losing track of time and getting confused.\"     Ayana " "reports previous commitment with [facility] from approximately 6/1/17-12/1/17. She reports being medication compliant during this time and shared \"the voices were improved, quiet, not as loud, not as yelling. But the day I got off the commitment I quit everything. Probably the meds were helping.\" She described other sporadic compliance with medication followed by long periods of non-compliance.    Per Psychiatric Discharge Summary from 4/15/19:  \"Ayana Prasad was admitted for suicidal thoughts and command hallucinations to harm herself.     See Admission note by Calvin Sebastian   on 4/9 for additional details.      Ayana Prasad was initially hospitalized voluntarily for command hallucinations telling her to harm herself and she was acting up on certain things that they would say including jumping out of a moving vehicle and throwing herself into a wall.  She has had a previous commitment and has a history of leaving the hospital prematurely.  She was still having serious command hallucinations and anxiety throughout her hospital stay and was placed on a 72-hour hold when she attempted to leave the hospital prematurely.  Support for commitment was not what the court did and she was not wanting to continue in the hospital.  She was accepting of restarting medications and we attempted ziprasidone which was not successful and she had side effects from.  She reported a previous benefit with trifluoperazine which we try titrated up to 2 mg 3 times a day.  On day of discharge she still has auditory hallucinations however they are slightly improved.  She has been sleeping but has difficulty on the disruptive units.  Has some hand pain and discussed her following up in the outpatient setting and getting a repeat image of the abnormality on her right hand being some type of growth possibly a ganglion cyst.  Day of discharge felt as though things were slightly better but still having serious anxiety.  Is hoping that " "she sleeps better at home and requested zolpidem.  No thoughts of harming herself or others no energy problems hopelessness or helplessness still has some attention concentration deficits and no guilty or grandiose ideas about herself for current manic symptoms.  Has been talking with her wife discussed current situation.  Discussed safety planning in detail even though she does not like being hospitalized.  She was willing to follow through with future safety planning and coming to the hospital if needed.\"    Per ED Visit 4/17/19:  \"HPI  Ayana Prasad is a 28 year old female who presents for hallucinations.  The patient is a history of bipolar disorder and has tried multiple different medications with poor control of her symptoms.  She was recently hospitalized in [facility] for hallucinations but signed herself out without a commitment hold.  She now regrets that decision feels as if she is poorly controlled.  She has not been able to sleep since she was discharged several days ago.  She denies any suicidal ideation or homicidal ideation.  The voices she hears are getting louder, they are not telling her to hurt herself but they are becoming very disruptive to her everyday life.  She says she wants help getting control.  She denies fever, chills, headache, chest pain, difficulty breathing, abdominal pain.  She reports that she did use marijuana last evening to try to help her sleep but this did not help.  She denies other drug use.    Assessments & Plan (with Medical Decision Making)  28-year-old female with a long history of poorly controlled bipolar disorder who presents for hallucinations asking for help.  She is not currently on a hold she denies suicidal or homicidal ideation.  She is here voluntarily.  She is given oral ziprasidone and lorazepam to help her relax and help with her anxiety and symptoms.  Urine pregnancy test and urine drug screen are currently pending.  She is awaiting evaluation by " "[facility].  She is signed out to Dr. Arvizu at change of shift.\"    Hoped for outcomes  \"I don t know. I don t know psychiatry a whole lot. I don t really like meds or talking. But I do want to get better I guess.\"     Past Psychiatric History (Brief)     Psychiatric diagnoses, date diagnosed, and associated symptoms   -Schizoaffective disorder - diagnosed 2017 [facility] inpatient  -PTSD - diagnosed 2017 [facility] inpatient  -Anxiety - diagnosed 2000  -ADHD - diagnosed 1997    With regard to diagnoses listed above, Ayana reported, \"Feel like they fit.\"    Per Psychiatric Discharge Summary dated 4/15/19:  Schizoaffective disorder bipolar type  Posttraumatic stress disorder  Social anxiety disorder  Borderline personality disorder  Tobacco use disorder  History of traumatic brain injury -- writer queried Ayana regarding history of TBI. Ayana reports she has not had a TBI but has had concussions from hockey in the past.     -History of a diagnosis of autism spectrum disorder? No  -History of a diagnosis of borderline personality disorder? Yes - \"Maybe once back in the day.\"    Psychiatric hospitalization(s), location, admit date, and length of stay  4/9/19 - 4/15/19 @ [facility]  - \"admitted for suicidal thoughts and command hallucinations to harm herself.\" (Per Psych Discharge Summary 4/15/19).  2/26/18 - 2/28/18 @ [facility] - \"admitted for worsening depressive symptoms, suicidal ideation, and AH that were commanding.\" (per Psychiatric Discharge Summary 2/28/18)  5/12/17 - 6/27/17 @ [facility] - \"admitted because of paranoia, possible delusions.\" (Per Psych Discharge Summary 6/27/27)  10/17/16 - 10/20/16 @ [facility] - \"admitted via transfer from [facility] after presenting to the emergency room with relapse in methamphetamine and psychosis.\" (Per Psych Discharge Summary 10/20/16).    Medications, length of use, benefits, and unpleasant effects  -Stelazine - started most recently in inpatient.  -Ativan  -Lunesta  -Trazadone     With regard to " "medication, Ayana stated, \"Allergic to olanzapine and zyrexa. Seroquel, allergic, taken. Abilify taken it. Have taken anti-psychotics in past for short periods of time. Commitment 2017 for 6 months took anti-psychotics during that time.\"     -History of antipsychotic use (cumulative months)? Yes - difficult to ascertain how long cumulatively Ayana has taken antipsychotics given history sporadic compliance. Ayana believes it is likely less than 1 year.    Outpatient Programs & Services  Current:  -Therapist - [facility] - one visit.   -No current outpatient psychiatrist. PCP-Dr. Becki Avery manages psychiatric medications at this time, is recommending a psychiatrist take over once outpatient psychiatry care is established.     Past Services:  -[facility] worker, ended last year.   -Therapist - Sheila Adams - [facility]. VAL obtained, request for records sent.   -Past psychiatrists that Ayana would see \"one time then quit.\"    Developmental & Medical History (Brief)    Past/Present developmental and/or medical issues, date diagnosed, and impact  None reported.    -History of significant head injury or loss of consciousness? If yes, history of brain imaging? Yes - concussions from hockey. Played through senior year of high school.   -History of a developmental delay or use of an IEP or 504? Yes - IEP related to ADHD    Psychosocial Supports:    Primary supports  Wife - Sergio. Parents - Teddy and Negra live in [place].     Strengths and coping strategies   Client-reported strengths: \"right now I don't think I have strengths. In general pretty smart. I'm nice.\"    Client-reported coping strategies: \"Don't really have any. Music I guess and walking.\"    Related to school and work history, Ayana shared, \"Completed Bachelor s degree in 2014 from [employer]. Doing my Master s program in Environmental Science, just dropped out. Was thinking of going back in May but think that " "might be too soon. Work history is sporadic. No job right now. Don t keep jobs for much longer than 3 months.\"    Screening/Assessment Measures:    PHQ9 was completed today, 19  Scored at 4    Modified [place] Symptom Index was completed today, 19.    Scorin-Not at all    1-Once during the month    2-Several times during the month    3-Several times a week    4-At least every day    1. In the past month, how often have you felt nervous, tense, worried, frustrated, or afraid? 4  2. In the past month, how often have you felt depressed? 0  3. In the past month, how often have you felt lonely? 2  4. In the past month, how often have others told you that you acted \"paranoid\" or \"suspicious\"? 3  5. In the past month, how often did you hear voices or hear or see things that other people didn't think were there? 4  6. (Read slowly) In the past month, how often did you have trouble making up your mind about something, like deciding where you wanted to go or what you wanted to do, or how to solve a problem? 4  7. (Read slowly) In the past month, how often did you have trouble thinking straight, or concentrating on something you needed to do like worrying so much, or thinking about problems so much that you can't remember or focus on other things? 4  8. In the past month, how often did you feel that your behavior or actions were strange or different from that of other people? 4  9. In the past month, how often did you feel out of place or like you did not fit in? 4  10. In the past month, how often did you forget important things? 4  11. In the past month, how often did you have problems with thinking too fast (thoughts racing)? 4  12. In the past month, how often did you feel suspicious or paranoid? 3  13. In the past month, how often did you feel like hurting or killing yourself? 0  14. In the past month, how often have you felt like seriously hurting someone else? 0    Mental Status Exams:  Alertness: " alert   Appearance: adequately groomed  Behavior/Demeanor: cooperative, pleasant and guarded, with fair  eye contact   Speech: regular rate and rhythm  Language: intact and no obvious problem. Preferred language identified as English.  Psychomotor: normal or unremarkable  Mood: depressed  Affect: restricted and guarded; was congruent to mood; was congruent to content  Thought Process/Associations: unremarkable  Thought Content:  Reports suicidal ideation without plan; without intent [details in Interim History] and delusions;  Denies violent ideation  Perception:  Reports auditory hallucinations, depersonalization and derealization;  Denies visual hallucinations  Insight: limited  Judgment: limited  Cognition: does  appear grossly intact; formal cognitive testing was not done    Techniques Utilized:   CBT Teaching Strategies:  Reinforcement and shaping (positive feedback for steps towards goals and gains in knowledge & skills)  Relapse prevention planning (review of stressors and early warning signs)  Coping skills training (review current coping skills and increase currently used skills)  Behavioral tailoring (fit taking medication into client's daily routine)    Motivational Teaching Strategies:  Connect info and skills with personal goals  Promote hope and positive expectations  Explore pros and cons of change  Re-frame experiences in positive light    Educational Teaching Strategies:  Review of written material/education  Relate information to client's experience  Ask questions to check comprehension  Break down information into small chunks  Adopt client's language     Psychiatric Diagnosis(es):    Schizoaffective disorder bipolar type  Posttraumatic stress disorder  Social anxiety disorder  Borderline personality disorder    Assessment/Progress Note:     Ayana Prasad is a 28 year old   female who presented for psychosis assessment of need visit to determine potential eligibility for  "participation in [facility] First Episode of Psychosis services. Ayana was referred by inpatient hospital care team. Ayana presented today as a Fair historian with Fair insight. She has a lifetime history of multiple hospitalizations and carries psychiatric diagnoses of schizoaffective disorder bipolar type, PTSD, social anxiety disorder and previous dx of borderline personality disorder, unsure if this is still accurate. Further diagnostic clarification is needed. A psychiatric diagnostic assessment was not scheduled; writer sent for therapy records to assist with clarification of appropriate program referral. Will follow-up with Ayana next week to make recommendation.    Ayana identified present symptoms to include auditory hallucinations, anxiety, depression, command hallucinations to harm self, confused thinking and depersonalization. Psychosocial stressors were identified as mental health symptoms and occupational / vocational stress. Explored Ayana's goal(s) to include \"get better.\"     Ayana is currently participating in outpatient therapy and PCP services. She may benefit from services such as psychotherapy, psychoeducation, medication management and SEE services with focus of psychosis for the purposes of stabilization, ongoing symptom management and recovery. Ayana's willingness to pursue said services seems fair. Ayana does have a history of medication and treatment non-compliance but expressed desire to participate in services at this point.     Identified risk factors and/or vulnerabilities include mood disorder with psychosis/paranoia, past serious attempts - especially recent, auditory hallucinations urging suicide and hopelessness, worthlessness. Protective factors and/or strengths identified as educated, has a previous history of therapy, intelligent, open to learning, open to suggestions / feedback, support of family, friends and providers, wants to learn and willing to ask questions. Suicidal " "ideation was not present at the time of today's visit. Safety plan was discussed and included reaching out to wife/family and utilizing crisis resources as provided by and reviewed with writer.    Ayana agrees to treatment with the capacity to do so. Agrees to call clinic for any problems. The patient understands to call 911 or come to the nearest ED if life threatening or urgent symptoms present.    Billing for \"Interactive Complexity\"?    No    Plan/Referrals:     A psychiatric diagnostic assessment was not scheduled; writer sent for therapy records from previous therapist-Sheila at [facility] to assist with clarification of appropriate program referral. Writer will follow-up with Ayana next week to make recommendation.    Provided Ayana writer's direct contact information and clinic contact number should needs or concerns arise prior to next contact.     Colleen Elizabeth AUGIE     [use CPT codes: 53719 (16-37 min), 38222 (38-52 min), 01398 (53+ min)]    Attestation:    I did not see this patient directly. This patient is discussed with me in individual clinical social work supervision, and I agree with the plan as documented.     DESIRE Renteria, [facility] Memorial HospitalSW, May 9, 2019        "

## 2019-05-09 ENCOUNTER — TELEPHONE (OUTPATIENT)
Dept: PSYCHIATRY | Facility: CLINIC | Age: 29
End: 2019-05-09

## 2019-05-09 NOTE — TELEPHONE ENCOUNTER
NAVIGATE Outreach  A Part of the [facility] First Episode of Psychosis Program     Patient Name: Ayana Prasad  /Age:  1990 (28 year old)    Contact: Writer called Ayana's OP Therapist CINTHYA Adrian on this date per her request to discuss care plan moving forward. Provided update that writer completed initial screen with Ayana and has sent request for records from her previous providers. Discussed need to review records with hopes to get a better sense of length of time cumulatively Ayana has been on anti-psychotics. Reviewed program eligibility with [facility] and reasoning behind this. Shared plan that writer will follow-up with Little next week once program recommendation is made: either NAVIGATE or Strengths Program. Little plans to continue to see Ayana in the meantime. Provided writer's direct contact information should Little have any needs, concerns or questions prior to next contact.     Colleen Elizabeth AM, SW  NAVIGATE Individual Resiliency  & Family Clinician

## 2019-05-09 NOTE — TELEPHONE ENCOUNTER
NAVIGATE Outreach  A Part of the [facility] First Episode of Psychosis Program     Patient Name: Ayana Prasad  /Age:  1990 (28 year old)    Contact: Writer called Ayana to follow-up regarding appointment last week and previous phone conversation earlier this week. Notified Ayana after review of records it appears that she may meet criteria for NAVIGATE services as she has not been on anti-psychotics for 12-months or longer. Engaged Ayana in discussion regarding her desire to pursue services at this time given her history of dis-engagement and medication non-compliance. Ayana shared she is wanting to engage in services and feels a program specific to psychosis would be useful. She also expressed commitment to adhere to medication recommendations at this time. Writer scheduled Ayana for a DA with NAVIGATE Director and Family Clinician - DESIRE Garcia, JULISA. Explained to Ayana this DA will confirm program eligibility and if Ayana does not meet program requirements a referral elsewhere can be facilitated at that time. Ayana expressed understanding and appreciation.     Plan: DA scheduled with NAVIGATE Director and Family Clinician - DESIRE Garcia, JULISA on  at 10am. Client and family aware they can reach out to writer directly and/or NAVIGATE team should concerns or needs arise prior to next scheduled appointment.     Colleen Elizabeth AM, LGSW  NAVIGATE Individual Resiliency  & Family Clinician

## 2019-05-09 NOTE — TELEPHONE ENCOUNTER
NAVIGATE Outreach  A Part of the [facility] First Episode of Psychosis Program     Patient Name: Ayana Prasad  /Age:  1990 (28 year old)    Contact: Writer called and provided update as detailed in phone call with Ayana to outpatient therapist through [facility] ([phone]) per Little's request. Provided writer's direct contact information for follow-up if needed.    Colleen Elizabeth AM, LGSW  NAVIGATE Individual Resiliency  & Family Clinician

## 2019-05-10 ENCOUNTER — TELEPHONE (OUTPATIENT)
Dept: PSYCHOLOGY | Facility: CLINIC | Age: 29
End: 2019-05-10

## 2019-05-10 ENCOUNTER — OFFICE VISIT (OUTPATIENT)
Dept: URGENT CARE | Facility: URGENT CARE | Age: 29
End: 2019-05-10
Payer: COMMERCIAL

## 2019-05-10 ENCOUNTER — ANCILLARY PROCEDURE (OUTPATIENT)
Dept: GENERAL RADIOLOGY | Facility: CLINIC | Age: 29
End: 2019-05-10
Attending: PHYSICIAN ASSISTANT
Payer: COMMERCIAL

## 2019-05-10 VITALS
WEIGHT: 195.6 LBS | OXYGEN SATURATION: 97 % | DIASTOLIC BLOOD PRESSURE: 87 MMHG | BODY MASS INDEX: 30.64 KG/M2 | TEMPERATURE: 98.5 F | SYSTOLIC BLOOD PRESSURE: 134 MMHG | HEART RATE: 107 BPM

## 2019-05-10 DIAGNOSIS — S63.614A SPRAIN OF RIGHT RING FINGER, UNSPECIFIED SITE OF FINGER, INITIAL ENCOUNTER: ICD-10-CM

## 2019-05-10 DIAGNOSIS — S69.91XA HAND INJURY, RIGHT, INITIAL ENCOUNTER: Primary | ICD-10-CM

## 2019-05-10 DIAGNOSIS — S69.91XA HAND INJURY, RIGHT, INITIAL ENCOUNTER: ICD-10-CM

## 2019-05-10 DIAGNOSIS — S63.616A SPRAIN OF RIGHT LITTLE FINGER, UNSPECIFIED SITE OF FINGER, INITIAL ENCOUNTER: ICD-10-CM

## 2019-05-10 DIAGNOSIS — S60.221A CONTUSION OF RIGHT HAND, INITIAL ENCOUNTER: ICD-10-CM

## 2019-05-10 PROCEDURE — 73130 X-RAY EXAM OF HAND: CPT | Mod: RT | Performed by: FAMILY MEDICINE

## 2019-05-10 PROCEDURE — 99213 OFFICE O/P EST LOW 20 MIN: CPT | Performed by: PHYSICIAN ASSISTANT

## 2019-05-10 NOTE — TELEPHONE ENCOUNTER
Writer left voicemail recommending client complete scheduled Diagnostic Assessment with Navigate program and follow those recommendations for care. If they believe services with [facility] is appropriate, follow-up sessions can be discussed. Writer requested return call to speak about any of client's comments or concerns.

## 2019-05-14 ENCOUNTER — MYC REFILL (OUTPATIENT)
Dept: FAMILY MEDICINE | Facility: CLINIC | Age: 29
End: 2019-05-14

## 2019-05-14 DIAGNOSIS — G47.9 SLEEP DISTURBANCES: ICD-10-CM

## 2019-05-14 DIAGNOSIS — F31.11 BIPOLAR 1 DISORDER, MANIC, MILD (H): ICD-10-CM

## 2019-05-14 DIAGNOSIS — F41.9 ANXIETY: ICD-10-CM

## 2019-05-15 DIAGNOSIS — R20.0 NUMBNESS AND TINGLING OF BOTH LEGS: Primary | ICD-10-CM

## 2019-05-15 DIAGNOSIS — R20.2 NUMBNESS AND TINGLING OF BOTH LEGS: Primary | ICD-10-CM

## 2019-05-15 RX ORDER — GABAPENTIN 100 MG/1
100 CAPSULE ORAL 3 TIMES DAILY
Qty: 90 CAPSULE | Refills: 1 | Status: ON HOLD | OUTPATIENT
Start: 2019-05-15 | End: 2019-06-11

## 2019-05-15 RX ORDER — TRIFLUOPERAZINE HYDROCHLORIDE 2 MG/1
2 TABLET, FILM COATED ORAL 3 TIMES DAILY
Qty: 90 TABLET | Refills: 0 | Status: ON HOLD | OUTPATIENT
Start: 2019-05-15 | End: 2019-06-11

## 2019-05-15 RX ORDER — ESZOPICLONE 3 MG/1
3 TABLET, FILM COATED ORAL AT BEDTIME
Qty: 30 TABLET | Refills: 0 | Status: SHIPPED | OUTPATIENT
Start: 2019-05-15 | End: 2019-05-31

## 2019-05-15 RX ORDER — LORAZEPAM 0.5 MG/1
0.5 TABLET ORAL 2 TIMES DAILY PRN
Qty: 30 TABLET | Refills: 0 | Status: ON HOLD | OUTPATIENT
Start: 2019-05-15 | End: 2019-06-11

## 2019-05-23 ENCOUNTER — OFFICE VISIT (OUTPATIENT)
Dept: PSYCHIATRY | Facility: CLINIC | Age: 29
End: 2019-05-23
Payer: COMMERCIAL

## 2019-05-23 ENCOUNTER — BEH TREATMENT PLAN (OUTPATIENT)
Dept: PSYCHIATRY | Facility: CLINIC | Age: 29
End: 2019-05-23

## 2019-05-23 DIAGNOSIS — F25.0 SCHIZOAFFECTIVE DISORDER, BIPOLAR TYPE (H): Primary | ICD-10-CM

## 2019-05-23 DIAGNOSIS — F43.10 PTSD (POST-TRAUMATIC STRESS DISORDER): ICD-10-CM

## 2019-05-23 DIAGNOSIS — F12.20 CANNABIS DEPENDENCE (H): ICD-10-CM

## 2019-05-23 ASSESSMENT — PATIENT HEALTH QUESTIONNAIRE - PHQ9
SUM OF ALL RESPONSES TO PHQ QUESTIONS 1-9: 8
5. POOR APPETITE OR OVEREATING: NEARLY EVERY DAY

## 2019-05-23 ASSESSMENT — ANXIETY QUESTIONNAIRES
6. BECOMING EASILY ANNOYED OR IRRITABLE: NEARLY EVERY DAY
2. NOT BEING ABLE TO STOP OR CONTROL WORRYING: NEARLY EVERY DAY
5. BEING SO RESTLESS THAT IT IS HARD TO SIT STILL: NEARLY EVERY DAY
1. FEELING NERVOUS, ANXIOUS, OR ON EDGE: NEARLY EVERY DAY
3. WORRYING TOO MUCH ABOUT DIFFERENT THINGS: NEARLY EVERY DAY
7. FEELING AFRAID AS IF SOMETHING AWFUL MIGHT HAPPEN: NEARLY EVERY DAY
GAD7 TOTAL SCORE: 21

## 2019-05-23 NOTE — PROGRESS NOTES
[facility] SEE Progress Note   For Supported Employment & Education    JILLIAN Enrollee: Ayana Prasad (1990)     MRN: [id number]  Date:  5/23/2019  Clinician: JILLIAN Supported Employment & , Linh Hamilton    SEE met with Ayana Prasad and Linh Howell [facility] to introduce services and explain what that SEE will conduct orientation and begin completing the Career and Education Inventory. Also explained that SEE will work with person to create goals and work with them to achieve these goals.     Orientation appointment set for 5/29//19 at noon.    Linh Hamilton Supported Employment and , JILLIAN
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
## 2019-05-23 NOTE — PATIENT INSTRUCTIONS
If you have safety concerns for yourself or others please call a crisis hotline, 9-1-1 or visit the nearest ED.     [facility] Program  [address], [address]  [place] [address]  Phone: [phone]      24/7 Crisis Hotlines:    National Suicide Prevention Hotline   158-234-XMUF (4966)    Crisis Hotline by County:    [facility] for [facility]  [phone]  [facility]   [phone]    Walk-In Centers and Mobile Teams  [facility] Walk-In Crisis Intervention  [phone]  [facility] (18+) Crisis Mobile Team    [phone]  [facility] Child (under 17) Crisis Mobile Team [phone]

## 2019-05-24 ASSESSMENT — ANXIETY QUESTIONNAIRES: GAD7 TOTAL SCORE: 21

## 2019-05-29 ENCOUNTER — OFFICE VISIT (OUTPATIENT)
Dept: PSYCHIATRY | Facility: CLINIC | Age: 29
End: 2019-05-29
Payer: COMMERCIAL

## 2019-05-29 DIAGNOSIS — F25.0 SCHIZOAFFECTIVE DISORDER, BIPOLAR TYPE (H): Primary | ICD-10-CM

## 2019-05-30 NOTE — PROGRESS NOTES
"NAVIGATE SEE Progress Note   For Supported Employment & Education    NAVIGATE Enrollee: Ayana Prasad (1990)     MRN: [id number]  Date:  5/29/19  Clinician: NAVIGATE Supported Employment & , Linh Hamilton    1. Client Status Update:   Ayana Prasad is interested in employment (Client working on completing Career Profile)    2. People present:   SEE/Writer  Client: Ayana Prasad      3. Total number of persons who participated in contact: (do not count yourself/SEE) 1    4. Length of Actual Contact: 60 minutes   Traveled? No    5. Location of contact:  [facility], [place]    6. Brief description of session, contact, or client status (include: strategies, interventions, client reaction to contact, next steps, etc)    Writer and Ayana met to start the Career and Education Inventory and to establish SEE related goals. Ayana is 28 years old,  with 3 children and is seeking a full or part time job this summer doing [employer]. Ayana reports she obtained her Bachelor's degree and started grad school but was unable to finish her master's degree in Environmental Science. Ayana reports she is a member of [employer] but would like school to still be on hold. Ayana reports her interests as: walking, being outside, fishing, 4 wheeling, listening to music, school work, small engine repair, boating and family. She reports that she walks upwards of 30 miles a day. She describes that this is a coping mechanism but also feels pain from walking so much and goes out alone at night with headphones in when she can't sleep. When asked if she ever is concerned for her safety walking at night, she said that 'worse things have happened - what are they gonna take? My shoes?\"    Ayana reports having ADHD and a long history of chemical use. She is currently 'sort of' sober (only smokes mj to get things done during the day) but for the first time is reaching out for help on her own for her mental " health and for her wife, who reports concerning behavior. Ayana reports her main concerns right now are her anxiety and sleep concerns as well as AH.     Ayana is motivated to work to keep herself busy and get walking in while being productive. She emailed me her resume but admits that it needs to be updated. Will f/u in next session with posted positions and will apply to [employer].      7. Completion of mutually agreed upon client task from previous meeting:  Not Applicable    8. Orientation and Treatment Planning:  SEE orientation meeting    9. Assessment:  Gathering SEE information/inventory regarding work and/or education history, skills, goals, and preferences with client    10. Placement:  Not Applicable    11. Follow Along Supports: (for clients who are working or attending school)   Not Applicable    12. Mutually agreed upon client task for next meeting:     email resume    13. Next Meeting Scheduled for: next week tue in clinic    Linh GANATE Supported Employment &

## 2019-05-31 ENCOUNTER — OFFICE VISIT (OUTPATIENT)
Dept: FAMILY MEDICINE | Facility: CLINIC | Age: 29
End: 2019-05-31
Payer: COMMERCIAL

## 2019-05-31 VITALS
SYSTOLIC BLOOD PRESSURE: 102 MMHG | WEIGHT: 194 LBS | HEART RATE: 100 BPM | TEMPERATURE: 99.7 F | BODY MASS INDEX: 30.38 KG/M2 | DIASTOLIC BLOOD PRESSURE: 70 MMHG | RESPIRATION RATE: 16 BRPM

## 2019-05-31 DIAGNOSIS — Z01.818 PREOP GENERAL PHYSICAL EXAM: ICD-10-CM

## 2019-05-31 DIAGNOSIS — M67.40 GANGLION CYST: Primary | ICD-10-CM

## 2019-05-31 LAB — HGB BLD-MCNC: 14.6 G/DL (ref 11.7–15.7)

## 2019-05-31 PROCEDURE — 85018 HEMOGLOBIN: CPT | Performed by: NURSE PRACTITIONER

## 2019-05-31 PROCEDURE — 36415 COLL VENOUS BLD VENIPUNCTURE: CPT | Performed by: NURSE PRACTITIONER

## 2019-05-31 PROCEDURE — 99214 OFFICE O/P EST MOD 30 MIN: CPT | Performed by: NURSE PRACTITIONER

## 2019-05-31 ASSESSMENT — ENCOUNTER SYMPTOMS
COUGH: 0
NUMBNESS: 0
LIGHT-HEADEDNESS: 0
PALPITATIONS: 0
DIZZINESS: 0
CONSTIPATION: 0
FREQUENCY: 0
POLYPHAGIA: 0
SORE THROAT: 0
DYSPHORIC MOOD: 0
NAUSEA: 0
ARTHRALGIAS: 0
ABDOMINAL DISTENTION: 0
DIFFICULTY URINATING: 0
SHORTNESS OF BREATH: 0
VOMITING: 0
CHEST TIGHTNESS: 0
HEADACHES: 0
SLEEP DISTURBANCE: 0
WHEEZING: 0
FATIGUE: 0
POLYDIPSIA: 0
DIARRHEA: 0
NERVOUS/ANXIOUS: 0
ABDOMINAL PAIN: 0
RHINORRHEA: 0
ACTIVITY CHANGE: 0

## 2019-05-31 ASSESSMENT — PAIN SCALES - GENERAL: PAINLEVEL: NO PAIN (0)

## 2019-05-31 NOTE — PROGRESS NOTES
[facility]  [address] [place] [id number]  [phone]  Dept: [phone]    PRE-OP EVALUATION:  Today's date: 2019    Ayana Prasad (: 1990) presents for pre-operative evaluation assessment as requested by Dr. Richardson.  She requires evaluation and anesthesia risk assessment prior to undergoing surgery/procedure for treatment of right ring finger flexor tendon sheath ganglion.    Proposed Surgery/ Procedure: Excise Right Ring Finger Flexor Tendon Sheath Ganglion  Date of Surgery/ Procedure: 19  Time of Surgery/ Procedure: 2:25 pm  Hospital/Surgical Facility: [facility]  Primary Physician: Becki Avery  Type of Anesthesia Anticipated: Local with MAC    Patient has a Health Care Directive or Living Will:  YES    1. NO - Do you have a history of heart attack, stroke, stent, bypass or surgery on an artery in the head, neck, heart or legs?  2. NO - Do you ever have any pain or discomfort in your chest?  3. NO - Do you have a history of  Heart Failure?  4. NO - Are you troubled by shortness of breath when: walking on the level, up a slight hill or at night?  5. NO - Do you currently have a cold, bronchitis or other respiratory infection?  6. NO - Do you have a cough, shortness of breath or wheezing?  7. NO - Do you sometimes get pains in the calves of your legs when you walk?  8. NO - Do you or anyone in your family have previous history of blood clots?  9. NO - Do you or does anyone in your family have a serious bleeding problem such as prolonged bleeding following surgeries or cuts?  10. NO - Have you ever had problems with anemia or been told to take iron pills?  11. NO - Have you had any abnormal blood loss such as black, tarry or bloody stools, or abnormal vaginal bleeding?  12. NO - Have you ever had a blood transfusion?  13. NO - Have you or any of your relatives ever had problems with anesthesia?  14. NO -  Do you have sleep apnea, excessive snoring or daytime drowsiness?  15. NO - Do you have any prosthetic heart valves?  16. NO - Do you have prosthetic joints?  17. NO - Is there any chance that you may be pregnant?      HPI:     HPI related to upcoming procedure: Is going to be having surgery to remove ganglion cyst from palm of right hand.  Cyst has been there for the last couple of months.  Is irritating, feels like there is constantly a rock in her hand.  No previous problems with anesthesia.  History of anesthesia problems.    Sleep has been better, currently getting 3 to 4 hours per night.  Is not taking any sleep aids-Lunesta, trazodone.  Reports that sleep medications do not work for her.  Only medication that is currently taking is gabapentin.    Reports lower back pain is currently manageable.  Has been walking up to 30 miles per day.  Does this as a coping mechanism.  Is going to be seeing mental health again on Monday.  Recently, got a job as a .    [place].  Is musky opener.    See problem list for active medical problems.  Problems all longstanding and stable, except as noted/documented.  See ROS for pertinent symptoms related to these conditions.                                                                                                                                                          .    MEDICAL HISTORY:     Patient Active Problem List    Diagnosis Date Noted     Suicidal ideation 04/09/2019     Priority: Medium     History of methamphetamine abuse 02/25/2019     Priority: Medium     Nausea 02/25/2019     Priority: Medium     Cyst of left ovary 11/05/2018     Priority: Medium     Nephrolithiasis 08/29/2018     Priority: Medium     Class 2 obesity due to excess calories in adult 03/07/2018     Priority: Medium     Auditory hallucinations 02/26/2018     Priority: Medium     Anxiety 01/05/2018     Priority: Medium     Schizoaffective disorder, bipolar type (H)  06/30/2017     Priority: Medium     PTSD (post-traumatic stress disorder) 06/30/2017     Priority: Medium     Cauda equina syndrome with neurogenic bladder (H) 01/20/2017     Priority: Medium     Moderate episode of recurrent major depressive disorder (H) 01/17/2017     Priority: Medium     Borderline personality disorder (H) 12/27/2016     Priority: Medium     History of heroin abuse 12/27/2016     Priority: Medium     Optic neuritis 03/04/2016     Priority: Medium     From recent dx of optic neuritis w/ vision loss, and iritis. Received infusions x 4 of solumedrol at [facility]. MRI done of head as well which was normal. Spinal tap looked ok per patient.         Intractable back pain 09/20/2015     Priority: Medium     Depression 02/14/2015     Priority: Medium     Overdose 02/14/2015     Priority: Medium     Overview:   Intentional overdose October 2016 and May 2016       Cannabis dependence (H) 08/22/2013     Priority: Medium     Episodic mood disorder (H) 08/22/2013     Priority: Medium     Opioid use disorder, severe, dependence (H) 08/22/2013     Priority: Medium     Substance-induced psychotic disorder with hallucinations (H) 08/22/2013     Priority: Medium     GERD (gastroesophageal reflux disease) 07/08/2013     Priority: Medium     Tobacco abuse 07/08/2013     Priority: Medium     Marijuana abuse 05/31/2013     Priority: Medium     Daily.  Some use of MDMA and cocaine in past and recently had a 4 day break where she doesn't recall getting lost in woods.        Polysubstance abuse (H) 05/31/2013     Priority: Medium     Marijuana daily, Xanax periodically.  MDMA a couple times and cocaine. Narcotics and over the counter. Dextromethorphan with overdose 5/1/16 at [facility].  CD recommended.        Bipolar 1 disorder, manic, mild 01/13/2012     Priority: Medium     Close to meeting criteria for bipolar 1? Reji Dey.  Psychology feels bipolar may be appropriate diagnosis although subsequent evaluation by  psychiatry at [place] felt depression with borderline personality.  Will return for med discussion with preference for Lithium 300 two times daily with goal 12 hour trough 0.8-1.2.  March 26, 2012 - intitiated Li and seemed to help some but stopped due to shakiness.  Lamotrigine trial as having more depression issues 25/d, up to two times daily then gradually increase to 100-200 mg daily.  Consider olazapine for thought disturbance. Has not wanted medications but used friends Xanax.  Prozac plus olazapine good next option once GI issues worked through. Patient very resistent to medications.  Continue with Reji.   May 31, 2013 - patient likely in a hypomanic/manic phase right now but no signs or symptoms of dangerous behaviors or thought processes.  Trial of olanzapine and fluoxetine. Didn't like olanzapine. Stopped as inpt for non-suicidal overdose at [facility]. Pyschiatry, psychology and continue with Prozac.  Now agreeing to take prozac and seroquel. Stopped Prozac on her own because didn't notice difference.  Trazodone didn't help. Stopped all. Possible haven?  Restart quetiapine for minor hallucinations.        ADHD (attention deficit hyperactivity disorder) 09/09/2011     Priority: Medium     Adderall ineffective.  Better on Concerta.  Still with anger issues predating medications. Declines counseling. Suggested vocational assessment.  Trial of guanfacine adjunct for anger but didn't help. Would avoid controlled substances given CD issues and impulsivity.       Abdominal pain, right upper quadrant 11/16/2010     Priority: Medium      Past Medical History:   Diagnosis Date     ADHD (attention deficit hyperactivity disorder)      Bipolar 1 disorder, manic, mild      Cauda equina syndrome      Depressive disorder      GERD (gastroesophageal reflux disease)      Marginal corneal ulcer, left 7/17/2015     Nephrolithiasis      Polysubstance abuse     currently in remission     Past Surgical History:   Procedure Laterality  Date     BACK SURGERY - For Cauda Equina Syndrome  12/24/2016     COLONOSCOPY       COMBINED CYSTOSCOPY, INSERT STENT URETER(S) Left 8/30/2018    Procedure: COMBINED CYSTOSCOPY, INSERT STENT URETER(S);  Cystoscopy With Left Stent Placement;  Surgeon: Kiran Ulrich MD;  Location: [facility] OR     COMBINED CYSTOSCOPY, RETROGRADES, EXCHANGE STENT URETER(S) Left 10/14/2018    Procedure: COMBINED CYSTOSCOPY, RETROGRADES, EXCHANGE STENT URETER(S);  Cystoscopy and removal of left-sided stent.;  Surgeon: Stiven Olivo MD;  Location:  OR     COMBINED CYSTOSCOPY, RETROGRADES, URETEROSCOPY, INSERT STENT  1/6/2014    Procedure: COMBINED CYSTOSCOPY, RETROGRADES, URETEROSCOPY, INSERT STENT;  Cystyoscopy place left ureteral stent;  Surgeon: Jaun Kimble MD;  Location: [facility] OR     COMBINED CYSTOSCOPY, RETROGRADES, URETEROSCOPY, INSERT STENT Left 10/23/2018    Procedure: Video cystoscopy, left ureteroscopy with stone extraction;  Surgeon: Bull Mast MD;  Location:  OR     CYSTOSCOPY, URETEROSCOPY, COMBINED Right 9/23/2015    Procedure: COMBINED CYSTOSCOPY, URETEROSCOPY;  Surgeon: ROME Jett MD;  Location: [facility] OR     ENT SURGERY       ESOPHAGOSCOPY, GASTROSCOPY, DUODENOSCOPY (EGD), COMBINED  4/8/2013    Procedure: COMBINED ESOPHAGOSCOPY, GASTROSCOPY, DUODENOSCOPY (EGD), BIOPSY SINGLE OR MULTIPLE;  Gastroscopy;  Surgeon: Peter Pickard MD;  Location: [facility] GI     ESOPHAGOSCOPY, GASTROSCOPY, DUODENOSCOPY (EGD), COMBINED Left 10/28/2014    Procedure: COMBINED ESOPHAGOSCOPY, GASTROSCOPY, DUODENOSCOPY (EGD), BIOPSY SINGLE OR MULTIPLE;  Surgeon: Narcisa Ramirez MD;  Location:  OR     ESOPHAGOSCOPY, GASTROSCOPY, DUODENOSCOPY (EGD), COMBINED N/A 12/24/2018    Procedure: COMBINED ESOPHAGOSCOPY, GASTROSCOPY, DUODENOSCOPY (EGD), BIOPSY SINGLE OR MULTIPLE;  Surgeon: Sonu Verduzco MD;  Location: [facility]     LAPAROSCOPIC CHOLECYSTECTOMY  11/20/2014    [facility], Dr. Ramirez     LASER HOLMIUM  LITHOTRIPSY URETER(S), INSERT STENT, COMBINED  4/2/2014    Procedure: COMBINED CYSTOSCOPY, URETEROSCOPY, LASER HOLMIUM LITHOTRIPSY URETER(S), INSERT STENT;  Cystoscopy,Left Ureteral Stent Removal,Left Ureteroscopy with Laser Lithotripsy,Left Ureter Stent Placement;  Surgeon: ROME Jett MD;  Location: [facility] OR     Transurethral stone resection  03/11/2011     Current Outpatient Medications   Medication Sig Dispense Refill     gabapentin (NEURONTIN) 100 MG capsule Take 1 capsule (100 mg) by mouth 3 times daily 90 capsule 1     LORazepam (ATIVAN) 0.5 MG tablet Take 1 tablet (0.5 mg) by mouth 2 times daily as needed for anxiety No more than 2 tablets/day.  Medications to be dispensed by wife, Sergio. (Patient not taking: Reported on 5/31/2019) 30 tablet 0     norelgestromin-ethinyl estradiol (ORTHO EVRA) 150-35 MCG/24HR patch Remove old patch and apply new patch onto the skin once a week for 3 weeks (21 days). Ok to wear consistently (Patient not taking: Reported on 5/31/2019) 12 patch 3     trifluoperazine (STELAZINE) 2 MG tablet Take 1 tablet (2 mg) by mouth 3 times daily (Patient not taking: Reported on 5/31/2019) 90 tablet 0     OTC products: None, except as noted above    Allergies   Allergen Reactions     Haldol [Haloperidol] Other (See Comments)     Makes patient very angry and anxious     Seroquel [Quetiapine] Palpitations     Spent 2 weeks in the hospital due to having seroquel, caused palpitations and QT prolongation     Zyprexa [Olanzapine] Other (See Comments)     Makes patient incredibly agitated and anxious     Adhesive Tape Hives     Percocet [Oxycodone-Acetaminophen] Nausea and Vomiting     Prednisone Other (See Comments) and Hives     Suicidal ideation     Risperidone Other (See Comments)     Droperidol Anxiety      Latex Allergy: NO    Social History     Tobacco Use     Smoking status: Former Smoker     Packs/day: 0.50     Years: 5.00     Pack years: 2.50     Types: Other     Start date: 8/1/2011      Smokeless tobacco: Current User     Types: Chew   Substance Use Topics     Alcohol use: No     Alcohol/week: 0.0 oz     History   Drug Use Unknown     Comment: past use marijuana, heroin and cocaine       REVIEW OF SYSTEMS:   Review of Systems   Constitutional: Negative for activity change and fatigue.   HENT: Negative for ear pain, rhinorrhea and sore throat.    Respiratory: Negative for cough, chest tightness, shortness of breath and wheezing.    Cardiovascular: Negative for chest pain, palpitations and leg swelling.   Gastrointestinal: Negative for abdominal distention, abdominal pain, constipation, diarrhea, nausea and vomiting.   Endocrine: Negative for cold intolerance, heat intolerance, polydipsia, polyphagia and polyuria.   Genitourinary: Negative for difficulty urinating, enuresis, frequency and urgency.   Musculoskeletal: Negative for arthralgias.   Skin: Negative for rash.   Neurological: Negative for dizziness, light-headedness, numbness and headaches.   Psychiatric/Behavioral: Negative for dysphoric mood and sleep disturbance. The patient is not nervous/anxious.        EXAM:   /70 (BP Location: Left arm, Patient Position: Sitting, Cuff Size: Adult Regular)   Pulse 100   Temp 99.7  F (37.6  C) (Tympanic)   Resp 16   Wt 88 kg (194 lb)   LMP  (LMP Unknown)   BMI 30.38 kg/m    Physical Exam   Constitutional: She appears well-developed and well-nourished.   HENT:   Head: Normocephalic and atraumatic.   Right Ear: Tympanic membrane and external ear normal. No middle ear effusion.   Left Ear: Tympanic membrane and external ear normal.  No middle ear effusion.   Nose: No mucosal edema.   Mouth/Throat: Uvula is midline, oropharynx is clear and moist and mucous membranes are normal.   Eyes: Pupils are equal, round, and reactive to light.   Neck: Normal range of motion. Carotid bruit is not present. No thyromegaly present.   Cardiovascular: Normal rate, regular rhythm and normal heart sounds.    Pulses:       Femoral pulses are 2+ on the right side, and 2+ on the left side.  Pulmonary/Chest: Effort normal and breath sounds normal.   Abdominal: Soft. Normal appearance and bowel sounds are normal. There is no tenderness.   Musculoskeletal: Normal range of motion. She exhibits no edema.   Neurological: She is alert.   Skin: Skin is warm and dry. No rash noted.   Psychiatric: She has a normal mood and affect. Her behavior is normal.         DIAGNOSTICS:     Labs Drawn and in Process:   Unresulted Labs Ordered in the Past 30 Days of this Admission     No orders found from 4/1/2019 to 6/1/2019.          Recent Labs   Lab Test 04/07/19  1744 03/22/19  1614  11/05/18  1642  10/03/17  1226  01/06/14 2000   HGB 14.0 15.2   < >  --    < > 13.5   < > 13.4    324   < >  --    < > 311   < > 300   INR  --   --   --   --   --   --   --  1.18*    139   < >  --    < > 139   < > 140   POTASSIUM 3.7 3.2*   < >  --    < > 3.8   < > 3.4   CR 0.68 0.71   < >  --    < > 0.76   < > 0.70   A1C  --   --   --  5.8*  --  5.8  --   --     < > = values in this interval not displayed.      Lab Results   Component Value Date    HGB 14.6 05/31/2019       IMPRESSION:   Reason for surgery/procedure: Cystoscopy palm of right hand  Diagnosis/reason for consult: Optimization    The proposed surgical procedure is considered INTERMEDIATE risk.    REVISED CARDIAC RISK INDEX  The patient has the following serious cardiovascular risks for perioperative complications such as (MI, PE, VFib and 3  AV Block):  No serious cardiac risks  INTERPRETATION: 0 risks: Class I (very low risk - 0.4% complication rate)    The patient has the following additional risks for perioperative complications:  No identified additional risks      ICD-10-CM    1. Ganglion cyst M67.40    2. Preop general physical exam Z01.818 Hemoglobin       RECOMMENDATIONS:       --Patient is to take all scheduled medications on the day of surgery EXCEPT for modifications  listed below.    APPROVAL GIVEN to proceed with proposed procedure       Signed Electronically by: BROOKLYN Cruz CNP    Copy of this evaluation report is provided to requesting physician.    [facility] Preop Guidelines    Revised Cardiac Risk Index

## 2019-06-03 ENCOUNTER — HOSPITAL ENCOUNTER (INPATIENT)
Facility: CLINIC | Age: 29
LOS: 7 days | Discharge: HOME | End: 2019-06-11
Attending: PSYCHIATRY & NEUROLOGY | Admitting: PSYCHIATRY & NEUROLOGY
Payer: COMMERCIAL

## 2019-06-03 ENCOUNTER — OFFICE VISIT (OUTPATIENT)
Dept: PSYCHIATRY | Facility: CLINIC | Age: 29
End: 2019-06-03
Payer: COMMERCIAL

## 2019-06-03 VITALS
DIASTOLIC BLOOD PRESSURE: 72 MMHG | SYSTOLIC BLOOD PRESSURE: 140 MMHG | WEIGHT: 190.2 LBS | HEART RATE: 102 BPM | BODY MASS INDEX: 29.79 KG/M2

## 2019-06-03 DIAGNOSIS — F30.9 MANIA (H): ICD-10-CM

## 2019-06-03 DIAGNOSIS — F90.2 ATTENTION DEFICIT HYPERACTIVITY DISORDER (ADHD), COMBINED TYPE: Primary | ICD-10-CM

## 2019-06-03 DIAGNOSIS — F25.0 SCHIZOAFFECTIVE DISORDER, BIPOLAR TYPE (H): Primary | ICD-10-CM

## 2019-06-03 DIAGNOSIS — F31.11 BIPOLAR 1 DISORDER, MANIC, MILD (H): ICD-10-CM

## 2019-06-03 DIAGNOSIS — F31.10 BIPOLAR I DISORDER, MOST RECENT EPISODE (OR CURRENT) MANIC (H): ICD-10-CM

## 2019-06-03 DIAGNOSIS — F25.0 SCHIZOAFFECTIVE DISORDER, BIPOLAR TYPE (H): ICD-10-CM

## 2019-06-03 DIAGNOSIS — Z72.0 TOBACCO ABUSE: ICD-10-CM

## 2019-06-03 PROCEDURE — 25000132 ZZH RX MED GY IP 250 OP 250 PS 637: Performed by: PSYCHIATRY & NEUROLOGY

## 2019-06-03 PROCEDURE — 99285 EMERGENCY DEPT VISIT HI MDM: CPT | Mod: 25 | Performed by: PSYCHIATRY & NEUROLOGY

## 2019-06-03 PROCEDURE — 90791 PSYCH DIAGNOSTIC EVALUATION: CPT

## 2019-06-03 PROCEDURE — 99284 EMERGENCY DEPT VISIT MOD MDM: CPT | Mod: Z6 | Performed by: PSYCHIATRY & NEUROLOGY

## 2019-06-03 PROCEDURE — 25000131 ZZH RX MED GY IP 250 OP 636 PS 637: Performed by: EMERGENCY MEDICINE

## 2019-06-03 RX ORDER — ONDANSETRON 4 MG/1
4 TABLET, ORALLY DISINTEGRATING ORAL ONCE
Status: COMPLETED | OUTPATIENT
Start: 2019-06-03 | End: 2019-06-03

## 2019-06-03 RX ORDER — HYDROXYZINE HYDROCHLORIDE 25 MG/1
50 TABLET, FILM COATED ORAL ONCE
Status: COMPLETED | OUTPATIENT
Start: 2019-06-03 | End: 2019-06-03

## 2019-06-03 RX ADMIN — ONDANSETRON 4 MG: 4 TABLET, ORALLY DISINTEGRATING ORAL at 19:30

## 2019-06-03 RX ADMIN — HYDROXYZINE HYDROCHLORIDE 50 MG: 25 TABLET ORAL at 22:41

## 2019-06-03 ASSESSMENT — ENCOUNTER SYMPTOMS
NEUROLOGICAL NEGATIVE: 1
ACTIVITY CHANGE: 1
MUSCULOSKELETAL NEGATIVE: 1
HALLUCINATIONS: 1
HYPERACTIVE: 1
NERVOUS/ANXIOUS: 1
DECREASED CONCENTRATION: 1
EYES NEGATIVE: 1
ENDOCRINE NEGATIVE: 1
RESPIRATORY NEGATIVE: 1
CARDIOVASCULAR NEGATIVE: 1
SLEEP DISTURBANCE: 1
GASTROINTESTINAL NEGATIVE: 1

## 2019-06-03 ASSESSMENT — ANXIETY QUESTIONNAIRES
6. BECOMING EASILY ANNOYED OR IRRITABLE: NEARLY EVERY DAY
2. NOT BEING ABLE TO STOP OR CONTROL WORRYING: NEARLY EVERY DAY
GAD7 TOTAL SCORE: 19
3. WORRYING TOO MUCH ABOUT DIFFERENT THINGS: NEARLY EVERY DAY
1. FEELING NERVOUS, ANXIOUS, OR ON EDGE: NEARLY EVERY DAY
7. FEELING AFRAID AS IF SOMETHING AWFUL MIGHT HAPPEN: SEVERAL DAYS
5. BEING SO RESTLESS THAT IT IS HARD TO SIT STILL: NEARLY EVERY DAY

## 2019-06-03 ASSESSMENT — PATIENT HEALTH QUESTIONNAIRE - PHQ9
SUM OF ALL RESPONSES TO PHQ QUESTIONS 1-9: 9
5. POOR APPETITE OR OVEREATING: NEARLY EVERY DAY

## 2019-06-03 ASSESSMENT — PAIN SCALES - GENERAL: PAINLEVEL: NO PAIN (0)
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
## 2019-06-04 PROBLEM — F30.9 MANIA (H): Status: ACTIVE | Noted: 2019-06-04

## 2019-06-04 LAB
ALBUMIN SERPL-MCNC: 4.2 G/DL (ref 3.4–5)
ALP SERPL-CCNC: 75 U/L (ref 40–150)
ALT SERPL W P-5'-P-CCNC: 25 U/L (ref 0–50)
AMPHETAMINES UR QL SCN: NEGATIVE
ANION GAP SERPL CALCULATED.3IONS-SCNC: 9 MMOL/L (ref 3–14)
AST SERPL W P-5'-P-CCNC: 24 U/L (ref 0–45)
BARBITURATES UR QL: NEGATIVE
BASOPHILS # BLD AUTO: 0.1 10E9/L (ref 0–0.2)
BASOPHILS NFR BLD AUTO: 0.6 %
BENZODIAZ UR QL: NEGATIVE
BILIRUB SERPL-MCNC: 0.8 MG/DL (ref 0.2–1.3)
BUN SERPL-MCNC: 14 MG/DL (ref 7–30)
CALCIUM SERPL-MCNC: 9.2 MG/DL (ref 8.5–10.1)
CANNABINOIDS UR QL SCN: POSITIVE
CHLORIDE SERPL-SCNC: 107 MMOL/L (ref 94–109)
CO2 SERPL-SCNC: 21 MMOL/L (ref 20–32)
COCAINE UR QL: NEGATIVE
CREAT SERPL-MCNC: 0.76 MG/DL (ref 0.52–1.04)
DIFFERENTIAL METHOD BLD: ABNORMAL
EOSINOPHIL # BLD AUTO: 0.1 10E9/L (ref 0–0.7)
EOSINOPHIL NFR BLD AUTO: 1.1 %
ERYTHROCYTE [DISTWIDTH] IN BLOOD BY AUTOMATED COUNT: 13.3 % (ref 10–15)
ETHANOL UR QL SCN: NEGATIVE
GFR SERPL CREATININE-BSD FRML MDRD: >90 ML/MIN/{1.73_M2}
GLUCOSE SERPL-MCNC: 94 MG/DL (ref 70–99)
HCT VFR BLD AUTO: 44.3 % (ref 35–47)
HGB BLD-MCNC: 14.9 G/DL (ref 11.7–15.7)
IMM GRANULOCYTES # BLD: 0 10E9/L (ref 0–0.4)
IMM GRANULOCYTES NFR BLD: 0.2 %
LYMPHOCYTES # BLD AUTO: 2.9 10E9/L (ref 0.8–5.3)
LYMPHOCYTES NFR BLD AUTO: 32.7 %
MCH RBC QN AUTO: 28.5 PG (ref 26.5–33)
MCHC RBC AUTO-ENTMCNC: 33.6 G/DL (ref 31.5–36.5)
MCV RBC AUTO: 85 FL (ref 78–100)
MONOCYTES # BLD AUTO: 0.5 10E9/L (ref 0–1.3)
MONOCYTES NFR BLD AUTO: 5.1 %
NEUTROPHILS # BLD AUTO: 5.3 10E9/L (ref 1.6–8.3)
NEUTROPHILS NFR BLD AUTO: 60.3 %
NRBC # BLD AUTO: 0 10*3/UL
NRBC BLD AUTO-RTO: 0 /100
OPIATES UR QL SCN: NEGATIVE
PLATELET # BLD AUTO: 337 10E9/L (ref 150–450)
POTASSIUM SERPL-SCNC: 4 MMOL/L (ref 3.4–5.3)
PROT SERPL-MCNC: 8.8 G/DL (ref 6.8–8.8)
RBC # BLD AUTO: 5.22 10E12/L (ref 3.8–5.2)
SODIUM SERPL-SCNC: 137 MMOL/L (ref 133–144)
TSH SERPL DL<=0.005 MIU/L-ACNC: 1.06 MU/L (ref 0.4–4)
WBC # BLD AUTO: 8.8 10E9/L (ref 4–11)

## 2019-06-04 PROCEDURE — 36415 COLL VENOUS BLD VENIPUNCTURE: CPT | Performed by: PSYCHIATRY & NEUROLOGY

## 2019-06-04 PROCEDURE — 80053 COMPREHEN METABOLIC PANEL: CPT | Performed by: PSYCHIATRY & NEUROLOGY

## 2019-06-04 PROCEDURE — 25000132 ZZH RX MED GY IP 250 OP 250 PS 637: Performed by: PSYCHIATRY & NEUROLOGY

## 2019-06-04 PROCEDURE — 85025 COMPLETE CBC W/AUTO DIFF WBC: CPT | Performed by: PSYCHIATRY & NEUROLOGY

## 2019-06-04 PROCEDURE — 25000132 ZZH RX MED GY IP 250 OP 250 PS 637: Performed by: EMERGENCY MEDICINE

## 2019-06-04 PROCEDURE — 12400001 ZZH R&B MH UMMC

## 2019-06-04 PROCEDURE — 80307 DRUG TEST PRSMV CHEM ANLYZR: CPT | Performed by: EMERGENCY MEDICINE

## 2019-06-04 PROCEDURE — 84443 ASSAY THYROID STIM HORMONE: CPT | Performed by: PSYCHIATRY & NEUROLOGY

## 2019-06-04 PROCEDURE — 80320 DRUG SCREEN QUANTALCOHOLS: CPT | Performed by: EMERGENCY MEDICINE

## 2019-06-04 PROCEDURE — G0177 OPPS/PHP; TRAIN & EDUC SERV: HCPCS

## 2019-06-04 PROCEDURE — 99223 1ST HOSP IP/OBS HIGH 75: CPT | Mod: AI | Performed by: PSYCHIATRY & NEUROLOGY

## 2019-06-04 RX ORDER — GABAPENTIN 100 MG/1
100 CAPSULE ORAL 3 TIMES DAILY
Status: DISCONTINUED | OUTPATIENT
Start: 2019-06-04 | End: 2019-06-08

## 2019-06-04 RX ORDER — LITHIUM CARBONATE 300 MG/1
300 TABLET, FILM COATED, EXTENDED RELEASE ORAL DAILY
Status: DISCONTINUED | OUTPATIENT
Start: 2019-06-04 | End: 2019-06-11 | Stop reason: HOSPADM

## 2019-06-04 RX ORDER — HYDROXYZINE HYDROCHLORIDE 50 MG/1
50 TABLET, FILM COATED ORAL EVERY 4 HOURS PRN
Status: DISCONTINUED | OUTPATIENT
Start: 2019-06-04 | End: 2019-06-11 | Stop reason: HOSPADM

## 2019-06-04 RX ORDER — LITHIUM CARBONATE 300 MG/1
600 TABLET, FILM COATED, EXTENDED RELEASE ORAL AT BEDTIME
Status: DISCONTINUED | OUTPATIENT
Start: 2019-06-04 | End: 2019-06-04

## 2019-06-04 RX ORDER — BISACODYL 10 MG
10 SUPPOSITORY, RECTAL RECTAL DAILY PRN
Status: DISCONTINUED | OUTPATIENT
Start: 2019-06-04 | End: 2019-06-11 | Stop reason: HOSPADM

## 2019-06-04 RX ORDER — LITHIUM CARBONATE 450 MG
450 TABLET, EXTENDED RELEASE ORAL AT BEDTIME
Status: DISCONTINUED | OUTPATIENT
Start: 2019-06-04 | End: 2019-06-10

## 2019-06-04 RX ORDER — ESZOPICLONE 1 MG/1
2 TABLET, FILM COATED ORAL
Status: DISCONTINUED | OUTPATIENT
Start: 2019-06-04 | End: 2019-06-11 | Stop reason: HOSPADM

## 2019-06-04 RX ORDER — TRIFLUOPERAZINE HYDROCHLORIDE 2 MG/1
2 TABLET, FILM COATED ORAL 3 TIMES DAILY
Status: DISCONTINUED | OUTPATIENT
Start: 2019-06-04 | End: 2019-06-07

## 2019-06-04 RX ORDER — LORAZEPAM 0.5 MG/1
0.5 TABLET ORAL ONCE
Status: COMPLETED | OUTPATIENT
Start: 2019-06-04 | End: 2019-06-04

## 2019-06-04 RX ORDER — HYDROXYZINE HYDROCHLORIDE 25 MG/1
25 TABLET, FILM COATED ORAL EVERY 4 HOURS PRN
Status: DISCONTINUED | OUTPATIENT
Start: 2019-06-04 | End: 2019-06-04

## 2019-06-04 RX ORDER — ACETAMINOPHEN 325 MG/1
650 TABLET ORAL EVERY 4 HOURS PRN
Status: DISCONTINUED | OUTPATIENT
Start: 2019-06-04 | End: 2019-06-11 | Stop reason: HOSPADM

## 2019-06-04 RX ORDER — TRAZODONE HYDROCHLORIDE 50 MG/1
50 TABLET, FILM COATED ORAL
Status: DISCONTINUED | OUTPATIENT
Start: 2019-06-04 | End: 2019-06-11 | Stop reason: HOSPADM

## 2019-06-04 RX ORDER — LORAZEPAM 0.5 MG/1
0.5 TABLET ORAL 2 TIMES DAILY PRN
Status: DISCONTINUED | OUTPATIENT
Start: 2019-06-04 | End: 2019-06-08

## 2019-06-04 RX ORDER — ALUMINA, MAGNESIA, AND SIMETHICONE 2400; 2400; 240 MG/30ML; MG/30ML; MG/30ML
30 SUSPENSION ORAL EVERY 4 HOURS PRN
Status: DISCONTINUED | OUTPATIENT
Start: 2019-06-04 | End: 2019-06-11 | Stop reason: HOSPADM

## 2019-06-04 RX ADMIN — LITHIUM CARBONATE 450 MG: 450 TABLET, EXTENDED RELEASE ORAL at 20:41

## 2019-06-04 RX ADMIN — LORAZEPAM 0.5 MG: 0.5 TABLET ORAL at 12:37

## 2019-06-04 RX ADMIN — LITHIUM CARBONATE 300 MG: 300 TABLET, EXTENDED RELEASE ORAL at 14:09

## 2019-06-04 RX ADMIN — TRIFLUOPERAZINE HYDROCHLORIDE 2 MG: 2 TABLET, FILM COATED ORAL at 20:41

## 2019-06-04 RX ADMIN — NICOTINE POLACRILEX 4 MG: 4 GUM, CHEWING ORAL at 12:08

## 2019-06-04 RX ADMIN — NICOTINE POLACRILEX 4 MG: 4 GUM, CHEWING ORAL at 14:11

## 2019-06-04 RX ADMIN — LORAZEPAM 0.5 MG: 0.5 TABLET ORAL at 00:36

## 2019-06-04 RX ADMIN — GABAPENTIN 100 MG: 100 CAPSULE ORAL at 20:41

## 2019-06-04 RX ADMIN — GABAPENTIN 100 MG: 100 CAPSULE ORAL at 12:08

## 2019-06-04 RX ADMIN — TRIFLUOPERAZINE HYDROCHLORIDE 2 MG: 2 TABLET, FILM COATED ORAL at 12:08

## 2019-06-04 ASSESSMENT — ACTIVITIES OF DAILY LIVING (ADL)
DRESS: 0-->INDEPENDENT
HYGIENE/GROOMING: INDEPENDENT
RETIRED_EATING: 0-->INDEPENDENT
ORAL_HYGIENE: INDEPENDENT
DRESS: SCRUBS (BEHAVIORAL HEALTH);INDEPENDENT
LAUNDRY: WITH SUPERVISION
TOILETING: 0-->INDEPENDENT
BATHING: 0-->INDEPENDENT
SWALLOWING: 0-->SWALLOWS FOODS/LIQUIDS WITHOUT DIFFICULTY
RETIRED_COMMUNICATION: 0-->UNDERSTANDS/COMMUNICATES WITHOUT DIFFICULTY

## 2019-06-04 ASSESSMENT — ANXIETY QUESTIONNAIRES: GAD7 TOTAL SCORE: 19

## 2019-06-04 ASSESSMENT — MIFFLIN-ST. JEOR: SCORE: 1624.46

## 2019-06-04 NOTE — PROGRESS NOTES
06/04/19 1505   General Information   Has Not Attended OT as of: 06/04/19     OT staff will meet with pt to review the role of occupational therapy and explain the value of having them involved in their treatment plan including options to meet current needs/self-identified goals. As group attendance is established, Pt will be given self-assessment to inform OT initial assessment.

## 2019-06-04 NOTE — PROGRESS NOTES
06/04/19 0441   Patient Belongings   Patient Belongings remains with patient;locker   Patient Belongings Put in Hospital Secure Location (Security or Locker, etc.) clothing;shoes   Belongings Search Yes   Clothing Search Yes   Second Staff Gab V     With Pt:  Gym Shorts, Sports Bra, Shirt, Underwear.  In Pt Locker:  Gym Shorts (x2), Jeans, Sweatpants, Socks (x4), Books (x3), Toothpaste (x2), Grey Shoes, Mens Boxer Shorts (x4), Womens underwear (x4), Deodorant, Conditioner, Mouth Moisturizer, Gum, T-shirts (x2), Sweatshirt, Black Hat, Blue Backpack.       A               Admission:  I am responsible for any personal items that are not sent to the safe or pharmacy.  [facility] is not responsible for loss, theft or damage of any property in my possession.    Signature:  _________________________________ Date: _______  Time: _____                                              Staff Signature:  ____________________________ Date: ________  Time: _____      2nd Staff person, if patient is unable/unwilling to sign:    Signature: ________________________________ Date: ________  Time: _____     Discharge:  [facility] has returned all of my personal belongings:    Signature: _________________________________ Date: ________  Time: _____                                          Staff Signature:  ____________________________ Date: ________  Time: _____

## 2019-06-04 NOTE — ED PROVIDER NOTES
"Per Intake's note in Epic from today (6/3/19)  Suzi Priest           6/3/19 4:59 PM   Note      S: recvd call from Dr. Flores from [facility] reporting she was referring pt to the ED for assessment and admission     B: Hx of bipolar.  MD reports pt is currently manic.  Pt has been off her meds 10 days.  Pt reports she's sleeping about 1 hour per night and walking up to 40 miles per day.  Pt reports AH telling her to keep walking and keep running.       A: pt's SO bringing pt to the ED after running home      R: please assess for admission            History     Chief Complaint   Patient presents with     Manic Behavior     Referred here for admission, not taking meds, manic and not sleeping.     The history is provided by the patient and medical records.     Ayana Prasad is a 28 year old female who is here referred by her psychiatrist (through the [facility]) due to concerns for decompensated bipolar illness. Patient felt that her meds were making her more anxious and restless and irritable. She decided to stop taking them 2 weeks ago. She did not feel her irritability has resolved, but now feels that sleep has been disrupted. She only sleeps an hour per night and is up pacing otherwise. She reports hearing voices that tell her to \"run.\"  She denies command hallucinations to kill herself. She denies using drugs. She has no acute medical concerns. She would like to be hospitalized for stabilization.    I have reviewed the Medications, Allergies, Past Medical and Surgical History, and Social History in the Epic system.    Review of Systems   Constitutional: Positive for activity change.   HENT: Negative.    Eyes: Negative.    Respiratory: Negative.    Cardiovascular: Negative.    Gastrointestinal: Negative.    Endocrine: Negative.    Genitourinary: Negative.    Musculoskeletal: Negative.    Skin: Negative.    Neurological: Negative.    Psychiatric/Behavioral: Positive for decreased concentration, " hallucinations and sleep disturbance. The patient is nervous/anxious and is hyperactive.    All other systems reviewed and are negative.      Physical Exam   BP: (!) 142/96  Pulse: 101  Temp: 99.2  F (37.3  C)  Resp: 18  SpO2: 98 %      Physical Exam   Constitutional: She appears well-developed and well-nourished.   HENT:   Head: Normocephalic.   Eyes: Pupils are equal, round, and reactive to light.   Neck: Normal range of motion.   Cardiovascular: Normal rate.   Pulmonary/Chest: Effort normal.   Abdominal: Soft.   Musculoskeletal: Normal range of motion.   Neurological: She is alert.   Skin: Skin is warm.   Psychiatric: Her speech is normal. Judgment and thought content normal. Her mood appears anxious. Her affect is blunt. She is agitated. She is not aggressive, not hyperactive and not combative. Thought content is not paranoid and not delusional. Cognition and memory are normal. She expresses no homicidal ideation. She is inattentive.   Nursing note and vitals reviewed.      ED Course        Procedures               Labs Ordered and Resulted from Time of ED Arrival Up to the Time of Departure from the ED - No data to display         Assessments & Plan (with Medical Decision Making)   Patient with bipolar disorder who is unable to sleep and feels restless, highly anxious and unable to function. She is referred for admission for stabilization.    I have reviewed the nursing notes.    I have reviewed the findings, diagnosis, plan and need for follow up with the patient.       Medication List      There are no discharge medications for this visit.         Final diagnoses:   Bipolar I disorder, most recent episode (or current) manic (H)       6/3/2019   [facility], [place], EMERGENCY DEPARTMENT     Marco Dean MD  06/03/19 0698

## 2019-06-04 NOTE — PROGRESS NOTES
Behavioral Health  Note    Behavioral Health  Spirituality Group Note    UNIT 10N    Name: Ayana Prasad YOB: 1990   MRN: [id number] Age: 28 year old      Patient attended -led group, which included discussion of spirituality, coping with illness and building resilience.    Patient attended group for 1.0 hrs.    The patient actively participated in group discussion and patient demonstrated an appreciation of topic's application for their personal circumstances.      Rosalia Mckeon  Chaplain Resident  Pager  [phone]

## 2019-06-04 NOTE — PLAN OF CARE
"Patient is a 28 year old female referred by her psychiatrist at the [facility] to seek IP MH tx after patient stated she has been off her medications for a few weeks and is experiencing haven. Patient states she is having AH telling her to continuously run, she voiced getting poor sleep at maybe an hour a day. When asking patient she she came off her meds she stated \"I thought they were making me angry but I guess that wasn't the case\". Patient denied drug use however chart states she used cocaine in March. Needing UTOX.     Patient has a MH hx of bipolar d/o type 1, borderline personality d/o, PTSD, and schizoaffective d/o.Patient presented as blunted, guarded, and slightly irritable. She denied any HI or SI. Patient has a hx of 3 prior suicidal attempts. The first was in  by CO2 poisoning after her grandma  and the last was in  attempt by hanging. Patient has had over a dozen IP MH admissions and denied ever trying to escape.    Patient is voluntary. No medical issues. She was placed on elopement precautions. Patient was asked if she wants anyone to be notified that she is here, patient decline. Admission profile is complete.  "

## 2019-06-04 NOTE — PLAN OF CARE
Illness Management Recovery model: Objectives    Patient will identify reason(s) for hospitalization from their perspective.  Patient will identify a minimum of three goals for discharge.  Patient will identify a minimum of three triggers that may increase their symptoms.  Patient will identify a minimum of three coping skills they can do to stay well.  Patient will identify their support system to demonstrate readiness for discharge.

## 2019-06-04 NOTE — PLAN OF CARE
BEHAVIORAL TEAM DISCUSSION    Participants: Mahad Diez MD, Deysi Santana RN, Ulices Blas LPC, Mei Brady OTR/L  Progress: Pt cooperative, taking meds  Continued Stay Criteria/Rationale: New patient  Medical/Physical: None reported as per patient  Precautions:   Behavioral Orders   Procedures    Code 1 - Restrict to Unit    Elopement precautions    Routine Programming     As clinically indicated    Single Room    Status 15     Every 15 minutes.     Plan: Medication Management, Stabilize Mood, Encourage to attend all programming offered on unit  Rationale for change in precautions or plan: none

## 2019-06-04 NOTE — PROGRESS NOTES
"Pt spent the morning napping until lunch where she got up to talk to the doctor, take vitals and medications. She did not eat lunch or breakfast during the day. She spent time after lunch pacing the hallway. She did not attend any groups during the day. She denies SI, SIB, depression, anxiety, pain, racing thoughts, and HI. She shares, \"my therapist wanted me to come here to be supervised while I get back on my medications.\" She is hopeful. No concerns from pt at this time.       06/04/19 1244   Behavioral Health   Hallucinations denies / not responding to hallucinations   Thinking intact   Orientation person: oriented;place: oriented;date: oriented;time: oriented   Memory baseline memory   Insight insight appropriate to events;insight appropriate to situation   Judgement intact   Eye Contact at examiner   Affect blunted, flat   Mood mood is calm   Physical Appearance/Attire appears stated age   Hygiene other (see comment)  (fair)   Suicidality other (see comments)  (denies)   1. Wish to be Dead No   2. Non-Specific Active Suicidal Thoughts  No   Self Injury other (see comment)  (denies)   Elopement   (none)   Activity other (see comment)  (visible in milieu, sleeping/napping)   Speech clear;coherent   Psychomotor / Gait steady;balanced;paces   Activities of Daily Living   Hygiene/Grooming independent   Oral Hygiene independent   Dress scrubs (behavioral health);independent   Laundry with supervision   Room Organization independent     "

## 2019-06-04 NOTE — H&P
"[facility]   Psychiatric History & Physical  Admission date: 6/3/2019        Chief Complaint:   \"I haven't been sleeping for a long time, and I've been very active, and walking around a lot all day.\"         HPI:     Ayana is a 28 year old female with history of schizoaffective disorder (bipolar type), cannabis use disorder, benzodiazepine use disorder, hallucinogen use disorder, methamphetamine use disorder, PTSD, Borderline Personality Disorder who was admitted on a voluntary basis for increased activated manic symptoms in the community. The patient was recently hospitalized at  on 4/2019 (for suicidal/commanding hallucinations), at which point she was stabilized on Trifluoperazine (Stelazine) 2 mg TID. The patient follow ups at [facility] for medication management and therapy. In the interim between her hospitalizations, the patient seems to have not been taking her medications daily for the past 2 weeks. When asked why she had not been taking her medications, she mentions that she did not feel it was working for her. She describes having heightened moods, fast paced thoughts, hyperactive energy, no need for sleep (especially in the past week), and some auditory hallucinations. The voices she hears instruct her to \"run,\" which she does all day, and mentions she feels like she been running/walking many miles every day. She denies any recent illicit substance use. Denies recent suicidal ideation, intent or plan. She is open to medication recommendations.           Past Psychiatric History:   Past inpatient treatment: Multiple prior hospitalizations. Most recent in 4/2019 at [place].   Past Medication Trials: duloxetine, olanzapine, hydroxyzine, benztropine, gabapentin, lithium, Lunesta, lorazepam, prazosin, risperidone, propranolol, lamotrigine, ziprasidone, aripiprazole, mirtazapine, Vyvanse, quetiapine.   Current outpatient psychiatrist: Seen at [facility] program. Sees Callie " MD Sandra.   Current outpatient therapist: CINTHYA Fitch   Past suicide attempts: 3 prior suicide attempts   History of commitments: Prior commitment in May 2017  History of ECT: None         Substance Use and History:       History of using tobacco as teenager, and has used chewing tobacco as an adult, and smokes cigarettes. Started using alcohol years ago. Has used meth before, but denies recent use. History of recurrent cannabis use. Hallucinogen use past. Patient denies any recent illicit substance use. Utox has not yet been taken.         Past Medical History:   PAST MEDICAL HISTORY:   Past Medical History:   Diagnosis Date     ADHD (attention deficit hyperactivity disorder)      Bipolar 1 disorder, manic, mild      Cauda equina syndrome      Depressive disorder      GERD (gastroesophageal reflux disease)      Marginal corneal ulcer, left 7/17/2015     Nephrolithiasis      Polysubstance abuse     currently in remission       PAST SURGICAL HISTORY:   Past Surgical History:   Procedure Laterality Date     BACK SURGERY - For Cauda Equina Syndrome  12/24/2016     COLONOSCOPY       COMBINED CYSTOSCOPY, INSERT STENT URETER(S) Left 8/30/2018    Procedure: COMBINED CYSTOSCOPY, INSERT STENT URETER(S);  Cystoscopy With Left Stent Placement;  Surgeon: Kiran Ulrich MD;  Location: [facility] OR     COMBINED CYSTOSCOPY, RETROGRADES, EXCHANGE STENT URETER(S) Left 10/14/2018    Procedure: COMBINED CYSTOSCOPY, RETROGRADES, EXCHANGE STENT URETER(S);  Cystoscopy and removal of left-sided stent.;  Surgeon: Stiven Olivo MD;  Location:  OR     COMBINED CYSTOSCOPY, RETROGRADES, URETEROSCOPY, INSERT STENT  1/6/2014    Procedure: COMBINED CYSTOSCOPY, RETROGRADES, URETEROSCOPY, INSERT STENT;  Cystyoscopy place left ureteral stent;  Surgeon: Jaun Kimble MD;  Location: [facility] OR     COMBINED CYSTOSCOPY, RETROGRADES, URETEROSCOPY, INSERT STENT Left 10/23/2018    Procedure: Video cystoscopy, left ureteroscopy  with stone extraction;  Surgeon: Bull Mast MD;  Location:  OR     CYSTOSCOPY, URETEROSCOPY, COMBINED Right 9/23/2015    Procedure: COMBINED CYSTOSCOPY, URETEROSCOPY;  Surgeon: ROME Jett MD;  Location: [facility] OR     ENT SURGERY       ESOPHAGOSCOPY, GASTROSCOPY, DUODENOSCOPY (EGD), COMBINED  4/8/2013    Procedure: COMBINED ESOPHAGOSCOPY, GASTROSCOPY, DUODENOSCOPY (EGD), BIOPSY SINGLE OR MULTIPLE;  Gastroscopy;  Surgeon: Peter Pickard MD;  Location: [facility] GI     ESOPHAGOSCOPY, GASTROSCOPY, DUODENOSCOPY (EGD), COMBINED Left 10/28/2014    Procedure: COMBINED ESOPHAGOSCOPY, GASTROSCOPY, DUODENOSCOPY (EGD), BIOPSY SINGLE OR MULTIPLE;  Surgeon: Narcisa Ramirez MD;  Location:  OR     ESOPHAGOSCOPY, GASTROSCOPY, DUODENOSCOPY (EGD), COMBINED N/A 12/24/2018    Procedure: COMBINED ESOPHAGOSCOPY, GASTROSCOPY, DUODENOSCOPY (EGD), BIOPSY SINGLE OR MULTIPLE;  Surgeon: Sonu Verduzco MD;  Location: [facility] GI     LAPAROSCOPIC CHOLECYSTECTOMY  11/20/2014    [facility], Dr. Ramirez     LASER HOLMIUM LITHOTRIPSY URETER(S), INSERT STENT, COMBINED  4/2/2014    Procedure: COMBINED CYSTOSCOPY, URETEROSCOPY, LASER HOLMIUM LITHOTRIPSY URETER(S), INSERT STENT;  Cystoscopy,Left Ureteral Stent Removal,Left Ureteroscopy with Laser Lithotripsy,Left Ureter Stent Placement;  Surgeon: ROME Jett MD;  Location: [facility] OR     Transurethral stone resection  03/11/2011             Family History:   FAMILY HISTORY:   Family History   Problem Relation Age of Onset     Gastrointestinal Disease Father         Crohn's Disease     Cancer Father         Liver Cancer     Other Cancer Father         Liver     Cancer Maternal Grandmother         lympoma     Diabetes Maternal Grandmother      Mental Illness Maternal Grandmother      Other Cancer Maternal Grandmother         Non Hodgkins Lymphoma     Diabetes Maternal Grandfather      Hypertension Maternal Grandfather      Prostate Cancer Maternal Grandfather      Hyperlipidemia Maternal  Grandfather      Heart Disease Paternal Grandfather         heart disease     Hypertension Brother      Other Cancer Brother         Esophegeal cancer     Diabetes Brother      Hyperlipidemia Mother      Mental Illness Mother      Anxiety Disorder Mother      Anesthesia Reaction Mother      Hypertension Brother      Other Cancer Brother      Other Cancer Paternal Half-Brother         Esophageal           Social History:   SOCIAL HISTORY:   Social History     Tobacco Use     Smoking status: Former Smoker     Packs/day: 0.50     Years: 5.00     Pack years: 2.50     Types: Other     Start date: 8/1/2011     Smokeless tobacco: Current User     Types: Chew   Substance Use Topics     Alcohol use: No     Alcohol/week: 0.0 oz            Physical ROS:   The patient endorses restlessness. 10-point review of systems was negative except as noted in HPI.         PTA Medications:     Medications Prior to Admission   Medication Sig Dispense Refill Last Dose     gabapentin (NEURONTIN) 100 MG capsule Take 1 capsule (100 mg) by mouth 3 times daily 90 capsule 1 6/3/2019 at Unknown time     LORazepam (ATIVAN) 0.5 MG tablet Take 1 tablet (0.5 mg) by mouth 2 times daily as needed for anxiety No more than 2 tablets/day.  Medications to be dispensed by wife, Sergio. 30 tablet 0 Past Week at Unknown time     trifluoperazine (STELAZINE) 2 MG tablet Take 1 tablet (2 mg) by mouth 3 times daily 90 tablet 0 6/3/2019 at Unknown time     norelgestromin-ethinyl estradiol (ORTHO EVRA) 150-35 MCG/24HR patch Remove old patch and apply new patch onto the skin once a week for 3 weeks (21 days). Ok to wear consistently 12 patch 3 Not Taking          Allergies:     Allergies   Allergen Reactions     Haldol [Haloperidol] Other (See Comments)     Makes patient very angry and anxious     Seroquel [Quetiapine] Palpitations     Spent 2 weeks in the hospital due to having seroquel, caused palpitations and QT prolongation     Zyprexa [Olanzapine] Other (See  "Comments)     Makes patient incredibly agitated and anxious     Adhesive Tape Hives     Percocet [Oxycodone-Acetaminophen] Nausea and Vomiting     Prednisone Other (See Comments) and Hives     Suicidal ideation     Risperidone Other (See Comments)     Droperidol Anxiety          Labs:   No results found for this or any previous visit (from the past 48 hour(s)).       Physical and Psychiatric Examination:     BP (!) 142/91   Pulse 65   Temp 97.4  F (36.3  C) (Tympanic)   Resp 16   Ht 1.702 m (5' 7\")   Wt 86.2 kg (190 lb)   LMP  (LMP Unknown)   SpO2 97%   BMI 29.76 kg/m    Weight is 190 lbs 0 oz  Body mass index is 29.76 kg/m .    Physical Exam:  I have reviewed the physical exam as documented by ED provider and agree with findings and assessment and have no additional findings to add at this time.    Mental Status Exam:  Appearance: Overweight appearing white female. Hair is cut short. Somewhat poor self care.   Attitude:  Somewhat cooperative.   Eye Contact:  fair  Mood:  anxious  Affect:  mood congruent  Speech:  clear, coherent  Language: fluent and intact in English  Psychomotor, Gait, Musculoskeletal:  no evidence of tardive dyskinesia, dystonia, or tics  Throught Process:  linear  Associations:  no loose associations  Thought Content:  Seemingly flight of ideas, some mild AH telling her to \"run.\" Denies any VH/SI/HI.   Insight:  limited  Judgement:  fair  Oriented to:  time, person, and place  Attention Span and Concentration:  Poor - Fair   Recent and Remote Memory:  Fair   Fund of Knowledge:  Average          Admission Diagnoses:   Schizoaffective disorder bipolar type  Posttraumatic stress disorder  Social anxiety disorder  Borderline personality disorder  Tobacco use disorder  History of traumatic brain injury           Assessment & Plan:     Assessment:  Patient seems to have had decompensation of her manic/psychotic symptoms in the context of poor medication compliance, possibly inadequate " medication regimen (inadequate coverage/stability for her specific level of manic/psychoic symptoms), and possible other stressors. Substance induced issues cannot be ruled out at this point since a utox has not yet been collected. Since the patient has found some benefit in Stelazine, we will continue with this medication for now, while adding on Lithium for further mood stability. BUN/Cr within normal limits.     Plan:  1.) Medication Plan:  - Lithium CR: 300 mg Daily and 450 mg HS  - Stelazine 2 mg TID    2.) Psychosocial Plan:  - Patient should follow up at [facility] post discharge    Legal:  Voluntary    Disposition:  Pending stabilization        Mahad Diez MD  [facility]
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
## 2019-06-05 PROCEDURE — G0177 OPPS/PHP; TRAIN & EDUC SERV: HCPCS

## 2019-06-05 PROCEDURE — H2032 ACTIVITY THERAPY, PER 15 MIN: HCPCS

## 2019-06-05 PROCEDURE — 90853 GROUP PSYCHOTHERAPY: CPT

## 2019-06-05 PROCEDURE — 25000132 ZZH RX MED GY IP 250 OP 250 PS 637: Performed by: PSYCHIATRY & NEUROLOGY

## 2019-06-05 PROCEDURE — 12400001 ZZH R&B MH UMMC

## 2019-06-05 RX ORDER — TRIFLUOPERAZINE HYDROCHLORIDE 1 MG/1
1-2 TABLET, FILM COATED ORAL 2 TIMES DAILY PRN
Status: DISCONTINUED | OUTPATIENT
Start: 2019-06-05 | End: 2019-06-11 | Stop reason: HOSPADM

## 2019-06-05 RX ADMIN — TRIFLUOPERAZINE HYDROCHLORIDE 2 MG: 2 TABLET, FILM COATED ORAL at 11:54

## 2019-06-05 RX ADMIN — NICOTINE POLACRILEX 4 MG: 4 GUM, CHEWING ORAL at 11:56

## 2019-06-05 RX ADMIN — GABAPENTIN 100 MG: 100 CAPSULE ORAL at 08:24

## 2019-06-05 RX ADMIN — TRIFLUOPERAZINE HYDROCHLORIDE 2 MG: 2 TABLET, FILM COATED ORAL at 14:20

## 2019-06-05 RX ADMIN — TRIFLUOPERAZINE HYDROCHLORIDE 2 MG: 2 TABLET, FILM COATED ORAL at 08:24

## 2019-06-05 RX ADMIN — NICOTINE POLACRILEX 4 MG: 4 GUM, CHEWING ORAL at 13:37

## 2019-06-05 RX ADMIN — NICOTINE POLACRILEX 4 MG: 4 GUM, CHEWING ORAL at 06:58

## 2019-06-05 RX ADMIN — LORAZEPAM 0.5 MG: 0.5 TABLET ORAL at 13:36

## 2019-06-05 RX ADMIN — LITHIUM CARBONATE 300 MG: 300 TABLET, EXTENDED RELEASE ORAL at 08:24

## 2019-06-05 RX ADMIN — LITHIUM CARBONATE 450 MG: 450 TABLET, EXTENDED RELEASE ORAL at 20:44

## 2019-06-05 RX ADMIN — NICOTINE POLACRILEX 8 MG: 4 GUM, CHEWING ORAL at 16:08

## 2019-06-05 RX ADMIN — HYDROXYZINE HYDROCHLORIDE 50 MG: 50 TABLET, FILM COATED ORAL at 09:39

## 2019-06-05 RX ADMIN — GABAPENTIN 100 MG: 100 CAPSULE ORAL at 20:43

## 2019-06-05 RX ADMIN — GABAPENTIN 100 MG: 100 CAPSULE ORAL at 14:20

## 2019-06-05 RX ADMIN — TRIFLUOPERAZINE HYDROCHLORIDE 2 MG: 2 TABLET, FILM COATED ORAL at 20:44

## 2019-06-05 ASSESSMENT — ACTIVITIES OF DAILY LIVING (ADL)
HYGIENE/GROOMING: INDEPENDENT
HYGIENE/GROOMING: INDEPENDENT
ORAL_HYGIENE: INDEPENDENT
LAUNDRY: WITH SUPERVISION
DRESS: STREET CLOTHES
ORAL_HYGIENE: INDEPENDENT
LAUNDRY: WITH SUPERVISION
DRESS: STREET CLOTHES

## 2019-06-05 NOTE — PROGRESS NOTES
"Pt had a good shift. She attended groups and even participated in a few. Pt goal of the day was to make the voices in her head stop/control it a little better. She stated that the voices in her head are getting louder and is telling her to do stuff. Pt stated that her concentration is not good. She denies SI, SIB, depression, pain, racing thoughts, and HI, However she did endorse AH and anxiety. She rated her anxiety at a 10, on a scale of 1-10. When asked , what the next step/barriers towards discharge? Pt responded with saying \"my impulsivity or  If I did something stupid\"  Pt had no concerns at this time.    Affect: Neutral  Appetite: pretty Good  Sleep: Good  Pain: none  SE's: none       06/05/19 1425   Behavioral Health   Hallucinations auditory   Thinking paranoid;poor concentration   Orientation person, disoriented;place, disoriented;date, disoriented;time, disoriented;situation, disoriented   Memory baseline memory   Insight poor   Judgement impaired   Eye Contact at examiner   Affect full range affect   Mood mood is calm;anxious   1. Wish to be Dead No   2. Non-Specific Active Suicidal Thoughts  No   Activities of Daily Living   Hygiene/Grooming independent   Oral Hygiene independent   Dress street clothes   Laundry with supervision   Room Organization independent            "

## 2019-06-05 NOTE — PROGRESS NOTES
06/05/19 1430   Psycho Education   Type of Intervention structured groups   Response participates with encouragement   Hours 1   Treatment Detail mental health goals; challenges     Tisha García, BORIS, [facility]

## 2019-06-05 NOTE — PROGRESS NOTES
Pt demonstrated increased maturity over past hospitalizations, presented her needs and insights clearly, and calmly expressed her personal creativity in session.       06/05/19 0926   Dance Movement Therapy   Type of Intervention structured groups   Response participates, initiates socially appropriate   Hours 1

## 2019-06-05 NOTE — PROGRESS NOTES
06/05/19 1522   General Information   Date Initially Attended OT 06/05/19     Groups Attended: OT Clinic, Mental Health Management     Affect/Hygiene/Presentation: Calm mood, pleasant upon interaction. Pt generally had a flat affect. No apparent hygiene concerns.     Patient Performance/Response:  -Clinic: Pt actively participated in occupational therapy clinic. Pt was able to ask for assistance as needed, and independently initiate a familiar, creative expression task after initial orientation to clinic materials was provided by writer. Pt demonstrated good focus and planning during task completion. Pt appeared comfortable interacting with writer when conversation was initiated with her, however she did not interact with peers at sat at a table by herself.  -Mental Health Management: Pt actively participated in a mental health management group with a focus on coping through movement to facilitate relaxation and stress management via chair yoga. Pt followed and engaged in the yoga poses, and verbalized feeling calmer at the end of group. Pt contributed at least one idea to a discussion at the end of group regarding the benefits of exercise, stretching, and deep breathing.     Plan: More information needed to complete OT Initial Assessment; OT staff will meet with pt to review the role of occupational therapy and explain the value of having them involved in their treatment plan including options to meet current needs/self-identified goals. As group attendance is established, Pt will be given self-assessment to inform OT initial assessment.

## 2019-06-05 NOTE — PROVIDER NOTIFICATION
"Pt reported to writer this AM that \"the voices are louder today, yelling at me to run into a wall\".  Pt denies SI or SIB intent, contracted for safety on the unit with writer to notify staff if voices get more intense or if she began to feel unsafe.  Writer spoke with Dr Villeda this AM (on-call for Dr Diez) re: above info.  Additional prn order for Stelazine 1-2 mg (po) BID placed.  Pt medicated with prn Vistaril at 0945, prn Stelazine 2 mg at 1200, Ativan prn 0.5 mg at 1335 & 2nd scheduled dose of Stelazine 2 mg at 1420.  Pt states this afternoon that the voices are still present but that her anxiety has decreased.  Pt has been attending groups, visible milieu and utilizing headphones which she reports help decrease the voices some.  No hypomanic sxs noted.  Pt has normal speech, polite & appreciative.  Continue to observe and assess closely, offer frequent support, reassurance and postive feedback for healthy coping skills she is using.  "

## 2019-06-06 PROCEDURE — G0177 OPPS/PHP; TRAIN & EDUC SERV: HCPCS

## 2019-06-06 PROCEDURE — 25000132 ZZH RX MED GY IP 250 OP 250 PS 637: Performed by: PSYCHIATRY & NEUROLOGY

## 2019-06-06 PROCEDURE — 12400001 ZZH R&B MH UMMC

## 2019-06-06 PROCEDURE — 99207 ZZC CDG-MDM COMPONENT: MEETS MODERATE - UP CODED: CPT | Performed by: PSYCHIATRY & NEUROLOGY

## 2019-06-06 PROCEDURE — 99232 SBSQ HOSP IP/OBS MODERATE 35: CPT | Performed by: PSYCHIATRY & NEUROLOGY

## 2019-06-06 RX ADMIN — GABAPENTIN 100 MG: 100 CAPSULE ORAL at 20:59

## 2019-06-06 RX ADMIN — TRIFLUOPERAZINE HYDROCHLORIDE 2 MG: 2 TABLET, FILM COATED ORAL at 08:30

## 2019-06-06 RX ADMIN — NICOTINE POLACRILEX 4 MG: 4 GUM, CHEWING ORAL at 08:30

## 2019-06-06 RX ADMIN — GABAPENTIN 100 MG: 100 CAPSULE ORAL at 14:34

## 2019-06-06 RX ADMIN — LORAZEPAM 0.5 MG: 0.5 TABLET ORAL at 17:56

## 2019-06-06 RX ADMIN — NICOTINE POLACRILEX 8 MG: 4 GUM, CHEWING ORAL at 11:56

## 2019-06-06 RX ADMIN — NICOTINE POLACRILEX 8 MG: 4 GUM, CHEWING ORAL at 17:56

## 2019-06-06 RX ADMIN — HYDROXYZINE HYDROCHLORIDE 50 MG: 50 TABLET, FILM COATED ORAL at 12:21

## 2019-06-06 RX ADMIN — ESZOPICLONE 2 MG: 1 TABLET, COATED ORAL at 20:59

## 2019-06-06 RX ADMIN — GABAPENTIN 100 MG: 100 CAPSULE ORAL at 08:30

## 2019-06-06 RX ADMIN — NICOTINE POLACRILEX 4 MG: 4 GUM, CHEWING ORAL at 14:34

## 2019-06-06 RX ADMIN — TRIFLUOPERAZINE HYDROCHLORIDE 2 MG: 2 TABLET, FILM COATED ORAL at 14:34

## 2019-06-06 RX ADMIN — TRIFLUOPERAZINE HYDROCHLORIDE 2 MG: 2 TABLET, FILM COATED ORAL at 18:51

## 2019-06-06 RX ADMIN — LORAZEPAM 0.5 MG: 0.5 TABLET ORAL at 09:57

## 2019-06-06 RX ADMIN — TRIFLUOPERAZINE HYDROCHLORIDE 2 MG: 2 TABLET, FILM COATED ORAL at 20:59

## 2019-06-06 RX ADMIN — LITHIUM CARBONATE 300 MG: 300 TABLET, EXTENDED RELEASE ORAL at 08:30

## 2019-06-06 RX ADMIN — LITHIUM CARBONATE 450 MG: 450 TABLET, EXTENDED RELEASE ORAL at 20:59

## 2019-06-06 RX ADMIN — TRIFLUOPERAZINE HYDROCHLORIDE 2 MG: 2 TABLET, FILM COATED ORAL at 12:21

## 2019-06-06 ASSESSMENT — ACTIVITIES OF DAILY LIVING (ADL)
HYGIENE/GROOMING: PROMPTS
LAUNDRY: WITH SUPERVISION
DRESS: INDEPENDENT
ORAL_HYGIENE: INDEPENDENT
DRESS: STREET CLOTHES

## 2019-06-06 NOTE — PROGRESS NOTES
Pt was attending and participating in groups.  She was active in the milieu, ate supper, and went to bed early.  Pt showed no signs of elopement, and SI or SIB.          06/05/19 9112   Behavioral Health   Hallucinations other (see comment)  (DIANNA)   Thinking other (see comment)  (DIANNA)   Orientation other (see comment)  (DIANNA)   Memory other (see comment)  (DIANNA)   Insight other (see comment)  (DIANNA)   Judgement   (DIANNA)   Eye Contact   (DIANNA)   Affect blunted, flat   Mood mood is calm   Physical Appearance/Attire attire appropriate to age and situation;appears stated age   Hygiene well groomed   Suicidality other (see comments)  (DIANNA)   1. Wish to be Dead   (DIANNA)   2. Non-Specific Active Suicidal Thoughts    (DIANNA)   Elopement   (DIANNA)   Activity   (DIANNA)   Speech   (DIANNA)   Medication Sensitivity no observed side effects  (DIANNA)   Psychomotor / Gait balanced   Activities of Daily Living   Hygiene/Grooming independent   Oral Hygiene independent   Dress street clothes   Laundry with supervision   Room Organization independent

## 2019-06-06 NOTE — PLAN OF CARE
"48 hour nursing observation     Bipolar I disorder, most recent episode (or current) manic (H) [F31.10]    Admit Date: 6/3/2019    Patient evaluation continues. Assessed mood,anxiety,thoughts and behavior. Patient is not progressing towards goals. Patient is encouraged to participate in groups and assisted to develop healthy coping skills.      /77   Pulse 85   Temp 98  F (36.7  C) (Tympanic)   Resp 16   Ht 1.702 m (5' 7\")   Wt 86.2 kg (190 lb)   LMP  (LMP Unknown)   SpO2 97%   BMI 29.76 kg/m      Patient reports depression level of 0 and anxiety level of 10 with 10 being the worst. Pt states her goal today was to attend all the groups which she did except for 1. Patient's affect is anxious but also flat & blunted at times.  Patient  Denies SI, SIB, HI or wishes to be dead.  Pt reports that she is still experiencing voices-\"they are still loud, yelling at me to run into a wall, saying mean things like I suck\", etc.  Pt contracted with writer that she is able to keep herself safe on the unit and will notify staff if she experiences any intent. Patient states concentration is \"poor, hard to think because of the voices being so loud\" in addition to endorsing racing thoughts.  Pt does track quite well during 1:1 w/writer, though. Patient denies any visual hallucinations or paranoia. Patient reports sleeping  hours 12 hours last night and appetite \"OK\" .      Patient is  compliant with medications and side effects reported are none. Pt medicated with prn Stelazine, Vistaril and Ativan this shift but reports \"doesn't help a lot\". Pt reports the headphones do help some with the voices & also given lavender patch & fidget. Patient attended most groups and is visible in the milieu.  Pt is quiet, little socialization w/peers but polite, cooperative and appreciative.  No manic or hypomanic sxs noted. ADLs are poor, pt states she hasn't showered since admission \"but I will try & take one tonight\".  VSS & pt denies " any physical pain. Refer to daily team meeting notes for individualized plan of care. Nursing will continue to observe and assess closely, offer frequent support, reassurance & positive feedback for empowering behaviors & utilizing healthy coping skills.

## 2019-06-06 NOTE — PROGRESS NOTES
06/05/19 1600   Group Therapy Session   Group Attendance attended group session   Total Time (minutes) 45   Group Type psychotherapeutic   Group Topic Covered emotions/expression   Patient Participation/Contribution cooperative with task;listened actively   Patient participated in psychotherapy group which focused on feelings expression in-the-moment through drawing and then processing. Ayana was actively engaged in the activity and group discussion. Initially she was withdrawn but her mood brightened with the group discussion. Affect flat.

## 2019-06-06 NOTE — PROGRESS NOTES
"[facility], [place]   Psychiatric Progress Note        Interim History:   Reason for Hospitalization:Ayana is a 28 year old female with history of schizoaffective disorder (bipolar type), cannabis use disorder, benzodiazepine use disorder, hallucinogen use disorder, methamphetamine use disorder, PTSD, Borderline Personality Disorder who was admitted on a voluntary basis for increased activated manic symptoms in the community.     Subjective: \"Still having the voices, and are getting intense, but I'm trying to distract myself with headphones/music\"     As per today's interview: Patient was seen present in the milieu, and attending/participating in therapy groups. She mentioned still having AH, which are commanding her to run or hurt herself. She have moderately been able to distract herself from these voices with music and groups, but does not feel the Stelazine is helping her.          Medications:       gabapentin  100 mg Oral TID     lithium ER  450 mg Oral At Bedtime     lithium ER  300 mg Oral Daily     trifluoperazine  2 mg Oral TID          Allergies:     Allergies   Allergen Reactions     Haldol [Haloperidol] Other (See Comments)     Makes patient very angry and anxious     Seroquel [Quetiapine] Palpitations     Spent 2 weeks in the hospital due to having seroquel, caused palpitations and QT prolongation     Zyprexa [Olanzapine] Other (See Comments)     Makes patient incredibly agitated and anxious     Adhesive Tape Hives     Percocet [Oxycodone-Acetaminophen] Nausea and Vomiting     Prednisone Other (See Comments) and Hives     Suicidal ideation     Risperidone Other (See Comments)     Droperidol Anxiety          Labs:     Recent Results (from the past 48 hour(s))   Drug abuse screen 6 urine (tox)    Collection Time: 06/04/19  4:30 PM   Result Value Ref Range    Amphetamine Qual Urine Negative NEG^Negative    Barbiturates Qual Urine Negative NEG^Negative    Benzodiazepine Qual " "Urine Negative NEG^Negative    Cannabinoids Qual Urine Positive (A) NEG^Negative    Cocaine Qual Urine Negative NEG^Negative    Ethanol Qual Urine Negative NEG^Negative    Opiates Qualitative Urine Negative NEG^Negative          Psychiatric Examination:   /77   Pulse 85   Temp 98  F (36.7  C) (Tympanic)   Resp 16   Ht 1.702 m (5' 7\")   Wt 86.2 kg (190 lb)   LMP  (LMP Unknown)   SpO2 97%   BMI 29.76 kg/m    Weight is 190 lbs 0 oz  Body mass index is 29.76 kg/m .    Appearance: Overweight appearing white female. Hair is cut short. Somewhat poor self care. Wearing head phones.   Attitude:  Somewhat cooperative.   Eye Contact:  fair  Mood:  anxious  Affect:  mood congruent  Speech:  clear, coherent  Language: fluent and intact in English  Psychomotor, Gait, Musculoskeletal:  no evidence of tardive dyskinesia, dystonia, or tics  Throught Process:  linear  Associations:  no loose associations  Thought Content:  Seemingly flight of ideas, some mild-intense AH telling her to \"run.\" Mild self injurious ideation. Denies any VH/SI/HI.   Insight:  limited  Judgement:  fair  Oriented to:  time, person, and place  Attention Span and Concentration:  Poor - Fair   Recent and Remote Memory:  Fair   Fund of Knowledge:  Average       Assessment & Plan       Principal Diagnosis:   Schizoaffective disorder bipolar type  Posttraumatic stress disorder  Social anxiety disorder  Borderline personality disorder  Tobacco use disorder  History of traumatic brain injury    Assessment:   (Initial Assessment): Patient seems to have had decompensation of her manic/psychotic symptoms in the context of poor medication compliance, possibly inadequate medication regimen (inadequate coverage/stability for her specific level of manic/psychoic symptoms), and possible other stressors. Substance induced issues cannot be ruled out at this point since a utox has not yet been collected. Since the patient has found some benefit in Stelazine, we " will continue with this medication for now, while adding on Lithium for further mood stability. BUN/Cr within normal limits.        Update 6/6: Patient continues to have AH that are commanding (telling her to run or hurt herself). She does not seem to find much benefit with being on Stelazine. I discussed possibly trying a neuroleptic she has not tried yet , such as Vraylar. I will confirm with pharmacy liason to make sure the patient can be prescribed this medications without any authorization trouble before I prescribe it.     Plan:  1.) Medication Plan:  - Lithium CR: 300 mg Daily and 450 mg HS  - Stelazine 2 mg TID     2.) Psychosocial Plan:  - Patient should follow up at [facility] post discharge     Legal:  Voluntary     Disposition:  Pending stabilization     Safety Assessment:   Checks: Status 15  Precautions: Suicide  Elopement  Pt has not required locked seclusion or restraints in the past 24 hours to maintain safety, please refer to RN documentation for further details.       The risks, benefits, alternatives and side effects have been discussed and are understood by the patient and other caregivers.       Anticipated Disposition/Discharge Date: Pending stabilization     Attestation:  Patient has been seen and evaluated by me,  Mahad Diez MD

## 2019-06-07 ENCOUNTER — TELEPHONE (OUTPATIENT)
Facility: CLINIC | Age: 29
End: 2019-06-07

## 2019-06-07 PROCEDURE — 25000132 ZZH RX MED GY IP 250 OP 250 PS 637: Performed by: PSYCHIATRY & NEUROLOGY

## 2019-06-07 PROCEDURE — G0177 OPPS/PHP; TRAIN & EDUC SERV: HCPCS

## 2019-06-07 PROCEDURE — 99232 SBSQ HOSP IP/OBS MODERATE 35: CPT | Performed by: PSYCHIATRY & NEUROLOGY

## 2019-06-07 PROCEDURE — 99207 ZZC CDG-CODE CATEGORY CHANGED: CPT | Performed by: PSYCHIATRY & NEUROLOGY

## 2019-06-07 PROCEDURE — 12400001 ZZH R&B MH UMMC

## 2019-06-07 RX ADMIN — CARIPRAZINE 1.5 MG: 1.5 CAPSULE, GELATIN COATED ORAL at 14:31

## 2019-06-07 RX ADMIN — TRIFLUOPERAZINE HYDROCHLORIDE 2 MG: 2 TABLET, FILM COATED ORAL at 11:19

## 2019-06-07 RX ADMIN — LITHIUM CARBONATE 450 MG: 450 TABLET, EXTENDED RELEASE ORAL at 20:17

## 2019-06-07 RX ADMIN — GABAPENTIN 100 MG: 100 CAPSULE ORAL at 20:16

## 2019-06-07 RX ADMIN — NICOTINE POLACRILEX 8 MG: 4 GUM, CHEWING ORAL at 20:17

## 2019-06-07 RX ADMIN — LORAZEPAM 0.5 MG: 0.5 TABLET ORAL at 19:03

## 2019-06-07 RX ADMIN — ESZOPICLONE 2 MG: 1 TABLET, COATED ORAL at 20:17

## 2019-06-07 RX ADMIN — GABAPENTIN 100 MG: 100 CAPSULE ORAL at 14:31

## 2019-06-07 RX ADMIN — NICOTINE POLACRILEX 8 MG: 4 GUM, CHEWING ORAL at 11:19

## 2019-06-07 RX ADMIN — GABAPENTIN 100 MG: 100 CAPSULE ORAL at 08:43

## 2019-06-07 RX ADMIN — TRIFLUOPERAZINE HYDROCHLORIDE 1 MG: 2 TABLET, FILM COATED ORAL at 19:20

## 2019-06-07 RX ADMIN — TRIFLUOPERAZINE HYDROCHLORIDE 2 MG: 2 TABLET, FILM COATED ORAL at 08:43

## 2019-06-07 RX ADMIN — NICOTINE POLACRILEX 8 MG: 4 GUM, CHEWING ORAL at 08:43

## 2019-06-07 RX ADMIN — LORAZEPAM 0.5 MG: 0.5 TABLET ORAL at 11:19

## 2019-06-07 RX ADMIN — LITHIUM CARBONATE 300 MG: 300 TABLET, EXTENDED RELEASE ORAL at 08:43

## 2019-06-07 RX ADMIN — NICOTINE POLACRILEX 8 MG: 4 GUM, CHEWING ORAL at 18:18

## 2019-06-07 RX ADMIN — TRAZODONE HYDROCHLORIDE 50 MG: 50 TABLET ORAL at 22:15

## 2019-06-07 RX ADMIN — HYDROXYZINE HYDROCHLORIDE 50 MG: 50 TABLET, FILM COATED ORAL at 22:15

## 2019-06-07 ASSESSMENT — ACTIVITIES OF DAILY LIVING (ADL)
DRESS: INDEPENDENT
HYGIENE/GROOMING: INDEPENDENT
HYGIENE/GROOMING: INDEPENDENT
ORAL_HYGIENE: INDEPENDENT
DRESS: STREET CLOTHES
LAUNDRY: WITH SUPERVISION
ORAL_HYGIENE: INDEPENDENT

## 2019-06-07 NOTE — TELEPHONE ENCOUNTER
Prior Authorization Approval    Authorization Effective Date:    Authorization Expiration Date:    Medication: Vraylar capsules  Approved Dose/Quantity: 1 capsule daily  Reference #: CMM Key: [id number], Case #: [id number]   Insurance Company: [employer] - Phone [phone] Fax [phone]  Expected CoPay:       CoPay Card Available: No    Foundation Assistance Needed: n/a  Which Pharmacy is filling the prescription (Not needed for infusion/clinic administered): n/a - Patient has not yet been discharged, and there are no outpatient orders for this medication yet    Bisi Ford  [place] [facility] Liaison     [facility]  [facility]  [address] [place] [address] [address] [address]   [email or web address]  [email or web address]   Phone: [phone]  Pager: [phone]  Fax: [phone]

## 2019-06-07 NOTE — PHARMACY
Consulted by Dr Mahad Diez to run a test claim for Vraylar capsules. Medication requires a Prior Authorization, and per plan, patient must have tried/failed up to 3 other antipsychotic medications to be eligible. Initiating prior auth--see separate telephone encounter to view prior auth details/updates.    Feel free to contact me with any other test claims, prior authorizations, or insurance questions regarding outpatient medications.     Thanks!        Bisi Ford  [place] [facility]charge Pharm[facility] Liaison     [facility]  [address] [place] [address]   [email or web address]  [email or web address]   Phone: [phone]  Pager: [phone]  Fax: [phone]

## 2019-06-07 NOTE — PROGRESS NOTES
Pt has been in the milieu.  She has been attending groups but keeps to herself.  Pt reported that she was hoping for discharge this weekend.  She reported that she is having some medication changes.  She reports that she is feeling anxious and rates her anxiety 8/10 with 10 the most.  She also reports that she is hearing voices and that they are louder today.  Pt utilized with PRN's.  Pt denies any SI or SIB thinking.  Will continue to monitor.     06/07/19 1309   Behavioral Health   Hallucinations auditory   Thinking distractable;poor concentration   Orientation person: oriented;place: oriented;date: oriented   Insight   (limited)   Judgement   (fair)   Eye Contact at examiner   Affect blunted, flat   Mood anxious   Physical Appearance/Attire attire appropriate to age and situation   Hygiene   (adequate)   Suicidality   (denies SI)   1. Wish to be Dead No   2. Non-Specific Active Suicidal Thoughts  No   Self Injury   (denies SIB thinking)   Elopement   (none observed)   Activity   (in the milieu/attending groups/keeps to self)   Speech clear;coherent   Psychomotor / Gait balanced;steady   Activities of Daily Living   Hygiene/Grooming independent   Oral Hygiene independent   Dress street clothes   Room Organization independent

## 2019-06-07 NOTE — PROGRESS NOTES
"   06/06/19 2000   Behavioral Health   Hallucinations denies / not responding to hallucinations   Thinking intact   Orientation person: oriented;place: oriented;date: oriented;time: oriented   Memory baseline memory   Insight admits / accepts   Judgement intact   Eye Contact at examiner   Affect blunted, flat   Mood anxious;mood is calm   Physical Appearance/Attire attire appropriate to age and situation;appears stated age   Hygiene neglected grooming - unclean body, hair, teeth   Suicidality other (see comments)  (denies)   1. Wish to be Dead No   2. Non-Specific Active Suicidal Thoughts  No   3. Active Sucidal Ideation with any Methods (Not Plan) Without Intent to Act  No   4. Active Suicidal Ideation with Some Intent to Act, Without Specific Plan  No   5. Active Suicidal Ideation with Specific Plan and Intent  No   Duration (Lifetime) NA   Change in Protective Factors? No   Enviromental Risk Factors None   Self Injury other (see comment)  (denies)   Elopement   (none)   Activity isolative;other (see comment)  (spends time in room other than groups )   Speech coherent;clear   Medication Sensitivity no stated side effects;no observed side effects   Psychomotor / Gait balanced;steady   Daily Care   Activity up ad joshua   Patient Performed Hygiene other (see comments)  (per patient, matierals provided)   Activities of Daily Living   Dress street clothes   Room Organization independent       Ayana's goal today was to attend groups which she said she completed except for one. Other than groups she is typically in her room with headphones. Pt denies depression, SI and SIB but rates anxiety at an 8/10. She has not showered or done laundry since arriving. Pt stated she has no concerns with medications, sleep appetite or psychotic symptoms but has requested anxiety medications for, \"the voices.\" Pt seemed calm and coherent during our conversation and states she feels like there are no more barriers between her and discharge " and she has completed everything here that she needed to. Pt is looking forward to visits from her wife in the next few days.

## 2019-06-07 NOTE — PROGRESS NOTES
"[facility], [place]   Psychiatric Progress Note        Interim History:   Reason for Hospitalization:Ayana is a 28 year old female with history of schizoaffective disorder (bipolar type), cannabis use disorder, benzodiazepine use disorder, hallucinogen use disorder, methamphetamine use disorder, PTSD, Borderline Personality Disorder who was admitted on a voluntary basis for increased activated manic symptoms in the community.     Subjective: \"Still having the voices, the PRN meds help a little, but trying to find way to distract myslef\"     As per today's interview: Patient was seen present in the milieu, and attending/participating in therapy groups. She mentioned still having AH, which are commanding her to run or hurt herself. She have moderately been able to distract herself from these voices with music and groups, but does not feel the Stelazine is helping her. She was open for other medications recommendations.          Medications:       cariprazine  1.5 mg Oral Daily     gabapentin  100 mg Oral TID     lithium ER  450 mg Oral At Bedtime     lithium ER  300 mg Oral Daily          Allergies:     Allergies   Allergen Reactions     Haldol [Haloperidol] Other (See Comments)     Makes patient very angry and anxious     Seroquel [Quetiapine] Palpitations     Spent 2 weeks in the hospital due to having seroquel, caused palpitations and QT prolongation     Zyprexa [Olanzapine] Other (See Comments)     Makes patient incredibly agitated and anxious     Adhesive Tape Hives     Percocet [Oxycodone-Acetaminophen] Nausea and Vomiting     Prednisone Other (See Comments) and Hives     Suicidal ideation     Risperidone Other (See Comments)     Droperidol Anxiety          Labs:     No results found for this or any previous visit (from the past 48 hour(s)).       Psychiatric Examination:   /77   Pulse 85   Temp 97.4  F (36.3  C) (Tympanic)   Resp 16   Ht 1.702 m (5' 7\")   Wt 86.2 kg (190 " "lb)   LMP  (LMP Unknown)   SpO2 98%   BMI 29.76 kg/m    Weight is 190 lbs 0 oz  Body mass index is 29.76 kg/m .    Appearance: Overweight appearing white female. Hair is cut short. Somewhat poor self care. Wearing head phones.   Attitude:  Somewhat cooperative.   Eye Contact:  fair  Mood:  anxious  Affect:  mood congruent  Speech:  clear, coherent  Language: fluent and intact in English  Psychomotor, Gait, Musculoskeletal:  no evidence of tardive dyskinesia, dystonia, or tics  Throught Process:  linear  Associations:  no loose associations  Thought Content:   Some mild-intense AH telling her to \"run.\" Mild self injurious ideation. Denies any VH/SI/HI.   Insight:  limited  Judgement:  fair  Oriented to:  time, person, and place  Attention Span and Concentration:  Poor - Fair   Recent and Remote Memory:  Fair   Fund of Knowledge:  Average       Assessment & Plan       Principal Diagnosis:   Schizoaffective disorder bipolar type  Posttraumatic stress disorder  Social anxiety disorder  Borderline personality disorder  Tobacco use disorder  History of traumatic brain injury    Assessment:   (Initial Assessment): Patient seems to have had decompensation of her manic/psychotic symptoms in the context of poor medication compliance, possibly inadequate medication regimen (inadequate coverage/stability for her specific level of manic/psychoic symptoms), and possible other stressors. Substance induced issues cannot be ruled out at this point since a utox has not yet been collected. Since the patient has found some benefit in Stelazine, we will continue with this medication for now, while adding on Lithium for further mood stability. BUN/Cr within normal limits.        Update 6/6: Patient continues to have AH that are commanding (telling her to run or hurt herself). She does not seem to find much benefit with being on Stelazine. I discussed possibly trying a neuroleptic she has not tried yet , such as Vraylar. I will confirm " with pharmacy liason to make sure the patient can be prescribed this medications without any authorization trouble before I prescribe it.     Update 6/7: Patient open to take Vraylar as a substitute for for Stelazine. She continues to utilize her coping skills to help attenuate her AH. Tolerating Lithium thusfar, will obtain level on Sunday.     Plan:  1.) Medication Plan:  - Lithium CR: 300 mg Daily and 450 mg HS  - Discontinue Stelazine 2 mg TID  - Start Vraylar 1.5 mg Daily  (approved by pharmacy)      2.) Psychosocial Plan:  - Patient should follow up at [facility] post discharge     Legal:  Voluntary     Disposition:  Pending stabilization     Safety Assessment:   Checks: Status 15  Precautions: Suicide  Elopement  Pt has not required locked seclusion or restraints in the past 24 hours to maintain safety, please refer to RN documentation for further details.       The risks, benefits, alternatives and side effects have been discussed and are understood by the patient and other caregivers.       Anticipated Disposition/Discharge Date: Pending stabilization     Attestation:  Patient has been seen and evaluated by me,  Mahad Diez MD

## 2019-06-07 NOTE — PROGRESS NOTES
Occupational Therapy Initial Assessment      Description: OT staff met with pt to review the role of occupational therapy and explain the value of having them involved in their treatment plan including options to meet current needs/self-identified goals. The below evaluation is a compilation of chart review, functional performance observation, and information obtained from an OT self-assessment.      Pt reported a daily routine to include: employment at a [employer], and hobbies including fishing and reading.        06/05/19 1522   General Information   Date Initially Attended OT 06/05/19   Clinical Impression   Affect Appropriate to situation   Orientation Oriented to person, place and time   Appearance and ADLs General cleanliness observed in most areas   Attention to Internal Stimuli No observed signs   Interaction Skills Interacts appropriately with staff;Initiates appropriately with staff;Interacts appropriately with peers;Initiates appropriately with peers   Ability to Communicate Needs Does so with prompts   Verbal Content Clear;Appropriate to topic   Ability to Maintain Boundaries Maintains appropriate physical boundaries;Maintains appropriate verbal boundaries   Participation Independently participates;Initiates participation   Concentration Concentrates 50 minutes   Ability to Concentrate With structure   Follows and Comprehends Directions Independently follows 2 step verbal directions   Memory Delayed and immediate recall intact   Organization Independently organizes medium tasks   Decision Making Independent   Planning and Problem Solving Occasionally needs assist/feedback   Ability to Apply and Learn Concepts Comprehends concepts, but needs assist to apply   Frustrations / Stress Tolerance Independently identifies skills ;Utilizes coping skills with assistance  (Coping Skills: music)   Level of Insight Some insight  (Pt did not identify stressors/triggers relating to symptoms )   Self Esteem Can  identify positives;Takes risks with support and encouragement;Accepts positive feedback  (Strengths: friendly, genuine )   Social Supports Has knowledge of support systems  (Family and friends)        Assessment: Pt would benefit from engagement in OT groups that support healthy recovery, specifically coping skill exploration, healthy leisure activity exploration, exploration of sensory sensitivities and modalities, routine development, and role fulfillment for symptom management.     Plan: Initiate occupational therapy goals per plan of care.      Current Goal: Pt will practice using >2 coping strategies to manage stress and reduce symptoms to demonstrate increased readiness for discharge.

## 2019-06-07 NOTE — PLAN OF CARE
"  Problem: OT General Care Plan  Goal: OT Goal 1  Description  Pt will practice using >2 coping strategies to manage stress and reduce symptoms to demonstrate increased readiness for discharge.      Groups Attended: OT Clinic, Mental Health Management     Affect/Hygiene/Presentation: Calm/pleasant mood, engaged, congruent affect. No apparent hygiene concerns.     Patient Performance/Response:  -Clinic: Pt actively participated in occupational therapy clinic. Pt was able to ask for assistance as needed, and independently initiate a familiar, creative expression task without difficulty. Pt demonstrated good focus for the full duration of group. Pt appeared comfortable interacting with peers and writer, however generally kept to herself and appeared focused on her selected task.  -Mental Health Management: Pt attended a structured OT group with a focus on self-awareness and socialization. Pt appeared comfortable sharing positive personal information, and was respectful in listening and responding to peers. Pt identified \"my sense of humor\" as a positive personal strength. Pt appeared to brighten upon interaction.     Goal Progress: Goal initiated this date.     Plan: Pt will be encouraged to maintain group attendance for continued ongoing assessment and support in completion of occupational therapy treatment goals.     Outcome: Improving     "

## 2019-06-07 NOTE — TELEPHONE ENCOUNTER
PA Initiation    Medication: Vraylar capsules  Insurance Company: [employer] - Phone [phone] Fax [phone]  Pharmacy Filling the Rx: n/a - Patient has not yet been discharged, and there are no outpatient orders for this medication yet  Start Date: 6/7/2019    Bisi Ford  [place] [facility]charge Pharmacy Liaison     [facility]  [address]  [address] [place] [address] [address], [place] [address]   [email or web address]  [email or web address]   Phone: [phone]  Pager: [phone]  Fax: [phone]

## 2019-06-08 PROCEDURE — 25000131 ZZH RX MED GY IP 250 OP 636 PS 637: Performed by: PSYCHIATRY & NEUROLOGY

## 2019-06-08 PROCEDURE — 12400001 ZZH R&B MH UMMC

## 2019-06-08 PROCEDURE — 25000132 ZZH RX MED GY IP 250 OP 250 PS 637: Performed by: PSYCHIATRY & NEUROLOGY

## 2019-06-08 PROCEDURE — H2032 ACTIVITY THERAPY, PER 15 MIN: HCPCS

## 2019-06-08 RX ORDER — LORAZEPAM 1 MG/1
1 TABLET ORAL 2 TIMES DAILY PRN
Status: DISCONTINUED | OUTPATIENT
Start: 2019-06-08 | End: 2019-06-11 | Stop reason: HOSPADM

## 2019-06-08 RX ORDER — GABAPENTIN 300 MG/1
300 CAPSULE ORAL 3 TIMES DAILY
Status: DISCONTINUED | OUTPATIENT
Start: 2019-06-08 | End: 2019-06-11 | Stop reason: HOSPADM

## 2019-06-08 RX ORDER — ONDANSETRON 4 MG/1
4 TABLET, ORALLY DISINTEGRATING ORAL EVERY 6 HOURS PRN
Status: DISCONTINUED | OUTPATIENT
Start: 2019-06-08 | End: 2019-06-11 | Stop reason: HOSPADM

## 2019-06-08 RX ADMIN — LORAZEPAM 1 MG: 1 TABLET ORAL at 18:23

## 2019-06-08 RX ADMIN — GABAPENTIN 300 MG: 300 CAPSULE ORAL at 21:30

## 2019-06-08 RX ADMIN — LITHIUM CARBONATE 450 MG: 450 TABLET, EXTENDED RELEASE ORAL at 21:31

## 2019-06-08 RX ADMIN — LORAZEPAM 0.5 MG: 0.5 TABLET ORAL at 11:12

## 2019-06-08 RX ADMIN — NICOTINE POLACRILEX 4 MG: 4 GUM, CHEWING ORAL at 10:00

## 2019-06-08 RX ADMIN — ESZOPICLONE 2 MG: 1 TABLET, COATED ORAL at 21:31

## 2019-06-08 RX ADMIN — HYDROXYZINE HYDROCHLORIDE 50 MG: 50 TABLET, FILM COATED ORAL at 18:23

## 2019-06-08 RX ADMIN — NICOTINE POLACRILEX 8 MG: 4 GUM, CHEWING ORAL at 21:33

## 2019-06-08 RX ADMIN — GABAPENTIN 300 MG: 300 CAPSULE ORAL at 13:20

## 2019-06-08 RX ADMIN — TRAZODONE HYDROCHLORIDE 50 MG: 50 TABLET ORAL at 21:31

## 2019-06-08 RX ADMIN — HYDROXYZINE HYDROCHLORIDE 50 MG: 50 TABLET, FILM COATED ORAL at 12:05

## 2019-06-08 RX ADMIN — ONDANSETRON 4 MG: 4 TABLET, ORALLY DISINTEGRATING ORAL at 13:19

## 2019-06-08 RX ADMIN — NICOTINE POLACRILEX 8 MG: 4 GUM, CHEWING ORAL at 19:29

## 2019-06-08 RX ADMIN — ACETAMINOPHEN 650 MG: 325 TABLET, FILM COATED ORAL at 09:59

## 2019-06-08 RX ADMIN — NICOTINE POLACRILEX 4 MG: 4 GUM, CHEWING ORAL at 11:12

## 2019-06-08 RX ADMIN — NICOTINE POLACRILEX 8 MG: 4 GUM, CHEWING ORAL at 08:36

## 2019-06-08 RX ADMIN — CARIPRAZINE 1.5 MG: 1.5 CAPSULE, GELATIN COATED ORAL at 08:36

## 2019-06-08 RX ADMIN — TRIFLUOPERAZINE HYDROCHLORIDE 2 MG: 2 TABLET, FILM COATED ORAL at 11:12

## 2019-06-08 RX ADMIN — GABAPENTIN 100 MG: 100 CAPSULE ORAL at 08:36

## 2019-06-08 RX ADMIN — LORAZEPAM 1 MG: 1 TABLET ORAL at 13:24

## 2019-06-08 RX ADMIN — LITHIUM CARBONATE 300 MG: 300 TABLET, EXTENDED RELEASE ORAL at 08:36

## 2019-06-08 RX ADMIN — NICOTINE POLACRILEX 4 MG: 4 GUM, CHEWING ORAL at 12:42

## 2019-06-08 RX ADMIN — NICOTINE POLACRILEX 8 MG: 4 GUM, CHEWING ORAL at 16:57

## 2019-06-08 ASSESSMENT — ACTIVITIES OF DAILY LIVING (ADL)
HYGIENE/GROOMING: INDEPENDENT
LAUNDRY: WITH SUPERVISION
LAUNDRY: WITH SUPERVISION
DRESS: INDEPENDENT;STREET CLOTHES
HYGIENE/GROOMING: INDEPENDENT
DRESS: STREET CLOTHES
ORAL_HYGIENE: INDEPENDENT
ORAL_HYGIENE: INDEPENDENT

## 2019-06-08 NOTE — PLAN OF CARE
"Patient was isolated to room at beginning of shift but did eventually come out and was in milieu but remains isolated to self. Patient reported anxiety 7/10, denies depression. Endorses auditory hallucinations of \"voices, they're loud but getting better.\" Denied command hallucinations, reported \"not anymore,\" when asked. Denies any AE from medications. Visited with girlfriend during visitation. Ativan and Stelazine given PRN before visitation d/t pt report of anxiety. Will continue to monitor for safety.  "

## 2019-06-08 NOTE — PROGRESS NOTES
"Pt had a good shift. Pt did experience high volume of auditory hallucinations pt stated that \"they are always there\" but they are really bad today. Pt stated what helps with her voices is art, meds, headphones and some sensory stuff. Pt took all of these steps to help her with the voices. Pt ranked her depression at a 0 (on a scale from 1-10, 10 being the worst) and her anxiety at a 8. Pt said no to SIB/SIB and VH. Pt was present in the milieu, social with her peers and ate her meals. Pt had a visitor today and the visit went well. This afternoon pt stated that the voices have settled down.       06/08/19 1500   Behavioral Health   Hallucinations auditory   Thinking distractable   Orientation person: oriented;place: oriented;date: oriented;time: oriented   Memory baseline memory   Insight other (see comment)  (limited)   Judgement impaired   Eye Contact at examiner   Affect blunted, flat   Mood mood is calm;anxious   Physical Appearance/Attire attire appropriate to age and situation;appears stated age   Hygiene other (see comment)  (fair)   Suicidality other (see comments)  (pt denies )   1. Wish to be Dead No   2. Non-Specific Active Suicidal Thoughts  No   Self Injury other (see comment)  (pt denies )   Elopement   (none observed )   Activity   (present in milieu )   Speech coherent;clear   Medication Sensitivity no stated side effects;no observed side effects   Psychomotor / Gait balanced;steady   Activities of Daily Living   Hygiene/Grooming independent   Oral Hygiene independent   Dress independent;street clothes   Laundry with supervision   Room Organization independent     "

## 2019-06-09 LAB — LITHIUM SERPL-SCNC: 0.66 MMOL/L (ref 0.6–1.2)

## 2019-06-09 PROCEDURE — 25000132 ZZH RX MED GY IP 250 OP 250 PS 637: Performed by: PSYCHIATRY & NEUROLOGY

## 2019-06-09 PROCEDURE — 80178 ASSAY OF LITHIUM: CPT | Performed by: PSYCHIATRY & NEUROLOGY

## 2019-06-09 PROCEDURE — 25000128 H RX IP 250 OP 636: Performed by: PSYCHIATRY & NEUROLOGY

## 2019-06-09 PROCEDURE — 36415 COLL VENOUS BLD VENIPUNCTURE: CPT | Performed by: PSYCHIATRY & NEUROLOGY

## 2019-06-09 PROCEDURE — 25000131 ZZH RX MED GY IP 250 OP 636 PS 637: Performed by: PSYCHIATRY & NEUROLOGY

## 2019-06-09 PROCEDURE — 12400001 ZZH R&B MH UMMC

## 2019-06-09 RX ORDER — NICOTINE 21 MG/24HR
1 PATCH, TRANSDERMAL 24 HOURS TRANSDERMAL DAILY
Status: DISCONTINUED | OUTPATIENT
Start: 2019-06-09 | End: 2019-06-11 | Stop reason: HOSPADM

## 2019-06-09 RX ORDER — OLANZAPINE 10 MG/2ML
10 INJECTION, POWDER, FOR SOLUTION INTRAMUSCULAR
Status: DISCONTINUED | OUTPATIENT
Start: 2019-06-09 | End: 2019-06-11 | Stop reason: HOSPADM

## 2019-06-09 RX ORDER — OLANZAPINE 10 MG/1
10 TABLET ORAL
Status: DISCONTINUED | OUTPATIENT
Start: 2019-06-09 | End: 2019-06-11 | Stop reason: HOSPADM

## 2019-06-09 RX ADMIN — LITHIUM CARBONATE 300 MG: 300 TABLET, EXTENDED RELEASE ORAL at 08:05

## 2019-06-09 RX ADMIN — GABAPENTIN 300 MG: 300 CAPSULE ORAL at 13:20

## 2019-06-09 RX ADMIN — OLANZAPINE 10 MG: 10 INJECTION, POWDER, LYOPHILIZED, FOR SOLUTION INTRAMUSCULAR at 19:56

## 2019-06-09 RX ADMIN — GABAPENTIN 300 MG: 300 CAPSULE ORAL at 19:56

## 2019-06-09 RX ADMIN — NICOTINE POLACRILEX 4 MG: 4 GUM, CHEWING ORAL at 20:56

## 2019-06-09 RX ADMIN — HYDROXYZINE HYDROCHLORIDE 50 MG: 50 TABLET, FILM COATED ORAL at 18:17

## 2019-06-09 RX ADMIN — NICOTINE POLACRILEX 4 MG: 4 GUM, CHEWING ORAL at 15:48

## 2019-06-09 RX ADMIN — NICOTINE POLACRILEX 4 MG: 4 GUM, CHEWING ORAL at 18:17

## 2019-06-09 RX ADMIN — ONDANSETRON 4 MG: 4 TABLET, ORALLY DISINTEGRATING ORAL at 15:48

## 2019-06-09 RX ADMIN — LORAZEPAM 1 MG: 1 TABLET ORAL at 10:07

## 2019-06-09 RX ADMIN — LITHIUM CARBONATE 450 MG: 450 TABLET, EXTENDED RELEASE ORAL at 20:57

## 2019-06-09 RX ADMIN — CARIPRAZINE 1.5 MG: 1.5 CAPSULE, GELATIN COATED ORAL at 08:05

## 2019-06-09 RX ADMIN — TRAZODONE HYDROCHLORIDE 50 MG: 50 TABLET ORAL at 20:55

## 2019-06-09 RX ADMIN — NICOTINE POLACRILEX 4 MG: 4 GUM, CHEWING ORAL at 13:47

## 2019-06-09 RX ADMIN — NICOTINE POLACRILEX 8 MG: 4 GUM, CHEWING ORAL at 07:51

## 2019-06-09 RX ADMIN — ONDANSETRON 4 MG: 4 TABLET, ORALLY DISINTEGRATING ORAL at 08:33

## 2019-06-09 RX ADMIN — NICOTINE POLACRILEX 8 MG: 4 GUM, CHEWING ORAL at 11:31

## 2019-06-09 RX ADMIN — ESZOPICLONE 2 MG: 1 TABLET, COATED ORAL at 20:55

## 2019-06-09 RX ADMIN — NICOTINE POLACRILEX 4 MG: 4 GUM, CHEWING ORAL at 19:25

## 2019-06-09 RX ADMIN — GABAPENTIN 300 MG: 300 CAPSULE ORAL at 08:05

## 2019-06-09 RX ADMIN — LORAZEPAM 1 MG: 1 TABLET ORAL at 16:24

## 2019-06-09 RX ADMIN — ONDANSETRON 4 MG: 4 TABLET, ORALLY DISINTEGRATING ORAL at 00:40

## 2019-06-09 RX ADMIN — HYDROXYZINE HYDROCHLORIDE 50 MG: 50 TABLET, FILM COATED ORAL at 13:20

## 2019-06-09 RX ADMIN — NICOTINE 1 PATCH: 21 PATCH, EXTENDED RELEASE TRANSDERMAL at 13:48

## 2019-06-09 RX ADMIN — NICOTINE POLACRILEX 8 MG: 4 GUM, CHEWING ORAL at 12:46

## 2019-06-09 RX ADMIN — TRIFLUOPERAZINE HYDROCHLORIDE 2 MG: 2 TABLET, FILM COATED ORAL at 15:01

## 2019-06-09 ASSESSMENT — ACTIVITIES OF DAILY LIVING (ADL)
HYGIENE/GROOMING: INDEPENDENT
DRESS: STREET CLOTHES
LAUNDRY: UNABLE TO COMPLETE
HYGIENE/GROOMING: INDEPENDENT
DRESS: INDEPENDENT
ORAL_HYGIENE: INDEPENDENT
ORAL_HYGIENE: INDEPENDENT
LAUNDRY: WITH SUPERVISION

## 2019-06-09 ASSESSMENT — MIFFLIN-ST. JEOR: SCORE: 1629.45

## 2019-06-09 NOTE — PROGRESS NOTES
06/08/19 2100   Therapeutic Recreation   Type of Intervention structured groups   Activity game   Response Participates, initiates socially appropriate   Hours 1     Pt participated in Therapeutic Recreation group with focus on leisure participation, social engagement, and strategic cognitive reasoning.  Engaged and cooperative in group recreational intervention via a group game.  Pt was a full participant throughout the entire duration of group. Showed progress in session goals. Pt mood was calm.

## 2019-06-09 NOTE — PROVIDER NOTIFICATION
"Pt has had a poor shift.  This AM, pt was hypomanic-\"I was up until 4AM because I drank 6 energy drinks last night\".  Speech more rapid & pressured, laughing, smiling, \"I really don't want to take my meds this morning because I feel so good and want to stay feeling like that but I guess I better take them because this is what I do at home, stop my meds\".  Pt requesting a prn med approx every 2 hours-Ativan, Vistaril, Stelazine & Zofran but reported little relief. Pt reported to VASILE Ruiz, that she was having a good day but as shift progressed, pt more labile, irritated & agitated.      This afternoon, pt layed herself face down on the floor by the lounge but did get up when instructed to do so.  When writer asked pt why she did that, pt replied, \"I just feel pissed, I'm irritable, I'm bored & do you think the Dr will let me go home tomorrow?'.  Earlier in shift, pt told me she was NOT allergic to Haldol, Zyprexa or Risperdal-\"I never told anyone I was allergic to those-I've gotten B52 & Zyprexa shots a lot when I have been in the hospital\".  Other times this afternoon, pt being social w/select peers, smiling at times, joking & playing chess for a brief period this afternoon.  At 1510, pt spontaneously began running up & down the halls and initially wouldn't stop when directed to, \"this isn't running\" and then laughed.  After several minutes, 3 staff stood across the song and firmly told pt she needed to stop running then she did.      Pt then went to the entrance door & was loitering around the door.  Also, when writer was in shift report, another staff told writer that pt softly hit the back of her head against the wall for several seconds.  Dr Santana notified of pt's above behaviors and that pt denied any allergy to Zyprexa.  Dr Santana gave a TORB order for Zyprexa prn (po) or (IM)-please see orders for details.  Pt is currently sitting calmly in a chair in the Dining Room, talking to another pt.  Continue to " observe & assess closely, encourage appropriate behaviors, set limits & redirect as indicated, support & encouragement for healthy coping skills she is using.

## 2019-06-09 NOTE — PROGRESS NOTES
Patient was calm and cooperative. She had a good morning and wants to focus on using better coping skills and working on art. Her next steps to discharge are to regulate mood and medications. She denies SI/SIB and depression. Appetite is good, sleep is poor and she has no pain. She wants more detail oriented things to keep her busy. She rated anxiety at 2-3. No other concerns today.

## 2019-06-10 ENCOUNTER — TELEPHONE (OUTPATIENT)
Dept: ORTHOPEDICS | Facility: CLINIC | Age: 29
End: 2019-06-10

## 2019-06-10 PROCEDURE — 99207 ZZC CDG-MDM COMPONENT: MEETS MODERATE - UP CODED: CPT | Performed by: PSYCHIATRY & NEUROLOGY

## 2019-06-10 PROCEDURE — 25000132 ZZH RX MED GY IP 250 OP 250 PS 637: Performed by: PSYCHIATRY & NEUROLOGY

## 2019-06-10 PROCEDURE — 12400001 ZZH R&B MH UMMC

## 2019-06-10 PROCEDURE — G0177 OPPS/PHP; TRAIN & EDUC SERV: HCPCS

## 2019-06-10 PROCEDURE — 25000131 ZZH RX MED GY IP 250 OP 636 PS 637: Performed by: PSYCHIATRY & NEUROLOGY

## 2019-06-10 PROCEDURE — 99233 SBSQ HOSP IP/OBS HIGH 50: CPT | Performed by: PSYCHIATRY & NEUROLOGY

## 2019-06-10 RX ORDER — GUANFACINE 1 MG/1
1 TABLET, EXTENDED RELEASE ORAL DAILY
Status: DISCONTINUED | OUTPATIENT
Start: 2019-06-10 | End: 2019-06-11 | Stop reason: HOSPADM

## 2019-06-10 RX ORDER — LITHIUM CARBONATE 300 MG/1
600 TABLET, FILM COATED, EXTENDED RELEASE ORAL AT BEDTIME
Status: DISCONTINUED | OUTPATIENT
Start: 2019-06-10 | End: 2019-06-11 | Stop reason: HOSPADM

## 2019-06-10 RX ORDER — FLUPHENAZINE HYDROCHLORIDE 1 MG/1
1 TABLET ORAL 2 TIMES DAILY
Status: DISCONTINUED | OUTPATIENT
Start: 2019-06-11 | End: 2019-06-11 | Stop reason: HOSPADM

## 2019-06-10 RX ADMIN — OLANZAPINE 10 MG: 10 TABLET, FILM COATED ORAL at 10:00

## 2019-06-10 RX ADMIN — NICOTINE POLACRILEX 4 MG: 4 GUM, CHEWING ORAL at 12:09

## 2019-06-10 RX ADMIN — GUANFACINE 1 MG: 1 TABLET, EXTENDED RELEASE ORAL at 15:03

## 2019-06-10 RX ADMIN — GABAPENTIN 300 MG: 300 CAPSULE ORAL at 14:05

## 2019-06-10 RX ADMIN — LITHIUM CARBONATE 600 MG: 300 TABLET, EXTENDED RELEASE ORAL at 21:37

## 2019-06-10 RX ADMIN — OLANZAPINE 10 MG: 10 TABLET, FILM COATED ORAL at 17:21

## 2019-06-10 RX ADMIN — NICOTINE POLACRILEX 4 MG: 4 GUM, CHEWING ORAL at 09:09

## 2019-06-10 RX ADMIN — NICOTINE POLACRILEX 4 MG: 4 GUM, CHEWING ORAL at 16:12

## 2019-06-10 RX ADMIN — HYDROXYZINE HYDROCHLORIDE 50 MG: 50 TABLET, FILM COATED ORAL at 16:12

## 2019-06-10 RX ADMIN — CARIPRAZINE 1.5 MG: 1.5 CAPSULE, GELATIN COATED ORAL at 08:00

## 2019-06-10 RX ADMIN — LITHIUM CARBONATE 300 MG: 300 TABLET, EXTENDED RELEASE ORAL at 08:00

## 2019-06-10 RX ADMIN — GABAPENTIN 300 MG: 300 CAPSULE ORAL at 08:00

## 2019-06-10 RX ADMIN — NICOTINE 1 PATCH: 21 PATCH, EXTENDED RELEASE TRANSDERMAL at 08:00

## 2019-06-10 RX ADMIN — NICOTINE POLACRILEX 4 MG: 4 GUM, CHEWING ORAL at 15:03

## 2019-06-10 RX ADMIN — LORAZEPAM 1 MG: 1 TABLET ORAL at 09:09

## 2019-06-10 RX ADMIN — NICOTINE POLACRILEX 4 MG: 4 GUM, CHEWING ORAL at 08:00

## 2019-06-10 RX ADMIN — LORAZEPAM 1 MG: 1 TABLET ORAL at 20:32

## 2019-06-10 RX ADMIN — TRAZODONE HYDROCHLORIDE 50 MG: 50 TABLET ORAL at 21:37

## 2019-06-10 RX ADMIN — ONDANSETRON 4 MG: 4 TABLET, ORALLY DISINTEGRATING ORAL at 12:09

## 2019-06-10 RX ADMIN — ESZOPICLONE 2 MG: 1 TABLET, COATED ORAL at 21:37

## 2019-06-10 RX ADMIN — GABAPENTIN 300 MG: 300 CAPSULE ORAL at 21:37

## 2019-06-10 RX ADMIN — NICOTINE POLACRILEX 4 MG: 4 GUM, CHEWING ORAL at 20:32

## 2019-06-10 ASSESSMENT — ACTIVITIES OF DAILY LIVING (ADL)
ORAL_HYGIENE: INDEPENDENT
HYGIENE/GROOMING: INDEPENDENT
DRESS: STREET CLOTHES;INDEPENDENT
LAUNDRY: WITH SUPERVISION

## 2019-06-10 NOTE — PROGRESS NOTES
Pt has been in the milieu.  She has been attending some groups.  Pt reported feeling very restless and anxious this am.  Pt was given prns and choose to listen to music and work on her dot to dot project, which she felt was helpful. Pt reports her current anxiety level is 5/10 with 10 the most.  She denies any depression at this time.  She denies any SI or SIB thinking at this time.  Pt reports that she is hearing voices but feels they are manageable.  Will continue to monitor.     06/10/19 1349   Behavioral Health   Hallucinations auditory   Thinking distractable   Orientation person: oriented;place: oriented;date: oriented   Insight poor   Judgement impaired   Eye Contact at examiner   Affect blunted, flat   Mood anxious   Physical Appearance/Attire attire appropriate to age and situation   Hygiene   (adequate)   Suicidality   (denies SI thinking)   1. Wish to be Dead No   2. Non-Specific Active Suicidal Thoughts  No   Self Injury   (denies SIB thinking)   Elopement   (none observed)   Activity   (visible in the milieu/attending some groups )   Speech clear;coherent   Psychomotor / Gait balanced;steady

## 2019-06-10 NOTE — TELEPHONE ENCOUNTER
Cheryle from [facility] OR control desk called to inform us that patients surgery with Dr. Richardson for 6/14 needed to be canceled.     Patient must have a physiatric eval done prior to surgery.     Called Psychiatry clinic patient was attending, and informed the nurse.

## 2019-06-10 NOTE — PLAN OF CARE
Patient visible in milieu, tense at times.  Flat affect, denies SI/SIB, depression, rated anxiety to be very high.   Received prn ativan, vistaril per request for anxiety.  Patient later told staff she's still feeling very high anxious.  She requested for prn IM zyprexa which was given.  Patient claimed feel much better afterwards.  Took scheduled medications with no problem.  Patient presently sleeping, will continue to monitor closely.

## 2019-06-10 NOTE — PROGRESS NOTES
"[facility], [place]   Psychiatric Progress Note        Interim History:   Reason for Hospitalization:Ayana is a 28 year old female with history of schizoaffective disorder (bipolar type), cannabis use disorder, benzodiazepine use disorder, hallucinogen use disorder, methamphetamine use disorder, PTSD, Borderline Personality Disorder who was admitted on a voluntary basis for increased activated manic symptoms in the community.     Subjective: \"Still having the voices, the PRN meds help a little, but trying to find way to distract myslef\"     As per today's interview: Patient was seen present in the milieu, and attending/participating in therapy groups. She mentioned still having AH, which are commanding her to run or hurt herself. She have moderately been able to distract herself from these voices with music and groups, but does not feel the Stelazine is helping her. She was open for other medications recommendations.          Medications:       [START ON 6/11/2019] fluPHENAZine  1 mg Oral BID     gabapentin  300 mg Oral TID     guanFACINE  1 mg Oral Daily     lithium ER  300 mg Oral Daily     lithium ER  600 mg Oral At Bedtime     nicotine  1 patch Transdermal Daily     nicotine   Transdermal Q8H     nicotine   Transdermal Daily          Allergies:     Allergies   Allergen Reactions     Haldol [Haloperidol] Other (See Comments)     Makes patient very angry and anxious     Seroquel [Quetiapine] Palpitations     Spent 2 weeks in the hospital due to having seroquel, caused palpitations and QT prolongation     Adhesive Tape Hives     Percocet [Oxycodone-Acetaminophen] Nausea and Vomiting     Prednisone Other (See Comments) and Hives     Suicidal ideation     Risperidone Other (See Comments)     Droperidol Anxiety          Labs:     Recent Results (from the past 48 hour(s))   Lithium level    Collection Time: 06/09/19  7:33 AM   Result Value Ref Range    Lithium Level 0.66 0.60 - 1.20 mmol/L " "         Psychiatric Examination:   /86 (BP Location: Left arm)   Pulse 116   Temp 97.6  F (36.4  C) (Tympanic)   Resp 18   Ht 1.702 m (5' 7\")   Wt 86.7 kg (191 lb 1.6 oz)   LMP  (LMP Unknown)   SpO2 97%   BMI 29.93 kg/m    Weight is 191 lbs 1.6 oz  Body mass index is 29.93 kg/m .    Appearance: Overweight appearing white female. Hair is cut short. Somewhat poor self care. Wearing head phones.   Attitude:  Somewhat cooperative.   Eye Contact:  fair  Mood:  anxious  Affect:  mood congruent  Speech:  clear, coherent  Language: fluent and intact in English  Psychomotor, Gait, Musculoskeletal:  no evidence of tardive dyskinesia, dystonia, or tics  Throught Process:  linear  Associations:  no loose associations  Thought Content:   Some mild-intense AH telling her to \"run.\" Mild self injurious ideation. Denies any VH/SI/HI.   Insight:  limited  Judgement:  fair  Oriented to:  time, person, and place  Attention Span and Concentration:  Poor - Fair   Recent and Remote Memory:  Fair   Fund of Knowledge:  Average       Assessment & Plan       Principal Diagnosis:   Schizoaffective disorder bipolar type  Posttraumatic stress disorder  Social anxiety disorder  Borderline personality disorder  Tobacco use disorder  History of traumatic brain injury    Assessment:   (Initial Assessment): Patient seems to have had decompensation of her manic/psychotic symptoms in the context of poor medication compliance, possibly inadequate medication regimen (inadequate coverage/stability for her specific level of manic/psychoic symptoms), and possible other stressors. Substance induced issues cannot be ruled out at this point since a utox has not yet been collected. Since the patient has found some benefit in Stelazine, we will continue with this medication for now, while adding on Lithium for further mood stability. BUN/Cr within normal limits.        Update 6/6: Patient continues to have AH that are commanding (telling her to " run or hurt herself). She does not seem to find much benefit with being on Stelazine. I discussed possibly trying a neuroleptic she has not tried yet , such as Vraylar. I will confirm with pharmacy liason to make sure the patient can be prescribed this medications without any authorization trouble before I prescribe it.     Update 6/7: Patient open to take Vraylar as a substitute for for Stelazine. She continues to utilize her coping skills to help attenuate her AH. Tolerating Lithium thusfar, will obtain level on Sunday. \    Update 6/8: Patient exhibited more elevated, hyperactive/impulsive behaviors over the weekend, which (as per staff observation), appeared to be more in lines with her personality disorder rather than haven. She wanted to be on a long acting injectable, preferably 1 month acting. We discussed Prolixin, which she was open with.  Lithium level was lower end of normal, thus dose will be increased.     Plan:  1.) Medication Plan:  - Lithium CR: 300 mg Daily and 600 mg HS  - Prolixin 1 mg BID  - Intuniv 1 mg Daily   - Start Vraylar 1.5 mg Daily  (approved by pharmacy)      2.) Psychosocial Plan:  - Patient should follow up at [facility] post discharge     Legal:  Voluntary     Disposition:  Pending stabilization     Safety Assessment:   Checks: Status 15  Precautions: Suicide  Elopement  Pt has not required locked seclusion or restraints in the past 24 hours to maintain safety, please refer to RN documentation for further details.       The risks, benefits, alternatives and side effects have been discussed and are understood by the patient and other caregivers.       Anticipated Disposition/Discharge Date: Pending stabilization     Attestation:  Patient has been seen and evaluated by me,  Mahad Diez MD

## 2019-06-10 NOTE — PROVIDER NOTIFICATION
"When a code 21 was called for a different unit, pt started making comments to staff about codes. Two staff members left for the code and writer watched her watch them leave. Pt then asked staff, \"Where are all the other staff?\" Staff said on another unit and patient asked, \"Oh for the code?\" She then yelled over to writer from the lounge \"Can I spend some time in seclusion?\" Writer approached patient to discuss this quietly. Pt stated she wanted to go to seclusion because \"It's quiet.\" Writer explained that she has her own room or there is the option of the cubby/enclave near the dining room. Pt asked \"Well, then can I just code 21?\" with a smirk. Pt then asked for medication and was given 10 mg zyprexa PRN. Pt later told writer she really liked zyprexa and it works well for her. The only side effect noted is increased appetite.   "

## 2019-06-10 NOTE — PLAN OF CARE
"Problem: OT General Care Plan  Goal: OT Goal 1  Pt will practice using >2 coping strategies to manage stress and reduce symptoms to demonstrate increased readiness for discharge.      Pt attended 1 out of 3 OT groups offered. Pt actively participated in occupational therapy clinic. Pt was able to ask for assistance as needed, and independently initiated a familiar, detailed, goal-directed task. Pt demonstrated good attention to task and attention to detail. Assertive, though polite in her requests for task materials. Minimal interactions with peers observed, and she chose to wear headphones throughout group. She took occasional self-initiated breaks from her task, and stated that she \"needs to walk,\" appearing somewhat restless. Calm and cooperative throughout this group.   "

## 2019-06-11 ENCOUNTER — TELEPHONE (OUTPATIENT)
Dept: PSYCHIATRY | Facility: CLINIC | Age: 29
End: 2019-06-11

## 2019-06-11 VITALS
SYSTOLIC BLOOD PRESSURE: 113 MMHG | HEIGHT: 67 IN | HEART RATE: 74 BPM | BODY MASS INDEX: 30.61 KG/M2 | RESPIRATION RATE: 24 BRPM | OXYGEN SATURATION: 97 % | DIASTOLIC BLOOD PRESSURE: 72 MMHG | WEIGHT: 195 LBS | TEMPERATURE: 98.3 F

## 2019-06-11 PROCEDURE — 25000132 ZZH RX MED GY IP 250 OP 250 PS 637: Performed by: PSYCHIATRY & NEUROLOGY

## 2019-06-11 PROCEDURE — 99238 HOSP IP/OBS DSCHRG MGMT 30/<: CPT | Performed by: PSYCHIATRY & NEUROLOGY

## 2019-06-11 RX ORDER — TRAZODONE HYDROCHLORIDE 50 MG/1
50 TABLET, FILM COATED ORAL
Qty: 30 TABLET | Refills: 1 | Status: SHIPPED | OUTPATIENT
Start: 2019-06-11 | End: 2019-07-19

## 2019-06-11 RX ORDER — OLANZAPINE 10 MG/1
10 TABLET ORAL 2 TIMES DAILY PRN
Qty: 60 TABLET | Refills: 1 | Status: SHIPPED | OUTPATIENT
Start: 2019-06-11 | End: 2019-07-19

## 2019-06-11 RX ORDER — HYDROXYZINE HYDROCHLORIDE 50 MG/1
50 TABLET, FILM COATED ORAL EVERY 4 HOURS PRN
Qty: 30 TABLET | Refills: 1 | Status: ON HOLD | OUTPATIENT
Start: 2019-06-11 | End: 2019-06-29

## 2019-06-11 RX ORDER — GUANFACINE 1 MG/1
1 TABLET, EXTENDED RELEASE ORAL DAILY
Qty: 30 TABLET | Refills: 1 | Status: SHIPPED | OUTPATIENT
Start: 2019-06-12 | End: 2019-06-26

## 2019-06-11 RX ORDER — LITHIUM CARBONATE 300 MG/1
600 TABLET, FILM COATED, EXTENDED RELEASE ORAL AT BEDTIME
Qty: 60 TABLET | Refills: 1 | Status: SHIPPED | OUTPATIENT
Start: 2019-06-11 | End: 2019-07-19

## 2019-06-11 RX ORDER — NICOTINE 21 MG/24HR
1 PATCH, TRANSDERMAL 24 HOURS TRANSDERMAL DAILY
Qty: 30 PATCH | Refills: 1 | Status: SHIPPED | OUTPATIENT
Start: 2019-06-12 | End: 2019-10-22

## 2019-06-11 RX ORDER — ONDANSETRON 4 MG/1
4 TABLET, FILM COATED ORAL EVERY 8 HOURS PRN
Qty: 30 TABLET | Refills: 1 | Status: SHIPPED | OUTPATIENT
Start: 2019-06-11 | End: 2019-10-22

## 2019-06-11 RX ORDER — ESZOPICLONE 2 MG/1
2 TABLET, FILM COATED ORAL
Qty: 30 TABLET | Refills: 0 | Status: SHIPPED | OUTPATIENT
Start: 2019-06-11 | End: 2019-07-19

## 2019-06-11 RX ORDER — LITHIUM CARBONATE 300 MG/1
300 TABLET, FILM COATED, EXTENDED RELEASE ORAL DAILY
Qty: 30 TABLET | Refills: 1 | Status: SHIPPED | OUTPATIENT
Start: 2019-06-12 | End: 2019-06-24

## 2019-06-11 RX ORDER — GABAPENTIN 300 MG/1
300 CAPSULE ORAL 3 TIMES DAILY
Qty: 90 CAPSULE | Refills: 1 | Status: SHIPPED | OUTPATIENT
Start: 2019-06-11 | End: 2019-06-24

## 2019-06-11 RX ORDER — FLUPHENAZINE HYDROCHLORIDE 1 MG/1
1 TABLET ORAL 2 TIMES DAILY
Qty: 60 TABLET | Refills: 1 | Status: SHIPPED | OUTPATIENT
Start: 2019-06-11 | End: 2019-07-19

## 2019-06-11 RX ORDER — LORAZEPAM 1 MG/1
1 TABLET ORAL 2 TIMES DAILY PRN
Qty: 60 TABLET | Refills: 0 | Status: SHIPPED | OUTPATIENT
Start: 2019-06-11 | End: 2019-07-19

## 2019-06-11 RX ADMIN — HYDROXYZINE HYDROCHLORIDE 50 MG: 50 TABLET, FILM COATED ORAL at 13:04

## 2019-06-11 RX ADMIN — GABAPENTIN 300 MG: 300 CAPSULE ORAL at 08:12

## 2019-06-11 RX ADMIN — LORAZEPAM 1 MG: 1 TABLET ORAL at 10:27

## 2019-06-11 RX ADMIN — TRIFLUOPERAZINE HYDROCHLORIDE 1 MG: 2 TABLET, FILM COATED ORAL at 13:04

## 2019-06-11 RX ADMIN — LITHIUM CARBONATE 300 MG: 300 TABLET, EXTENDED RELEASE ORAL at 08:12

## 2019-06-11 RX ADMIN — NICOTINE 1 PATCH: 21 PATCH, EXTENDED RELEASE TRANSDERMAL at 08:12

## 2019-06-11 RX ADMIN — NICOTINE POLACRILEX 4 MG: 4 GUM, CHEWING ORAL at 10:27

## 2019-06-11 RX ADMIN — FLUPHENAZINE HYDROCHLORIDE 1 MG: 1 TABLET, FILM COATED ORAL at 08:12

## 2019-06-11 RX ADMIN — NICOTINE POLACRILEX 4 MG: 4 GUM, CHEWING ORAL at 13:04

## 2019-06-11 RX ADMIN — GUANFACINE 1 MG: 1 TABLET, EXTENDED RELEASE ORAL at 08:54

## 2019-06-11 RX ADMIN — NICOTINE POLACRILEX 4 MG: 4 GUM, CHEWING ORAL at 08:13

## 2019-06-11 RX ADMIN — OLANZAPINE 10 MG: 10 TABLET, FILM COATED ORAL at 10:27

## 2019-06-11 ASSESSMENT — MIFFLIN-ST. JEOR: SCORE: 1647.14

## 2019-06-11 NOTE — DISCHARGE INSTRUCTIONS
Behavioral Discharge Planning and Instructions      Summary:  You were admitted on 6/3/2019  due to Anxiety, Psychotic Symptomology and Manic Symptomology.  You were treated by Dr Mahad Diez MD and discharged on 6/11/19 from [facility]      Principal Diagnosis:   Schizoaffective DIsorder  Social Anxiety Dsoder  Borderline personality disorder    Health Care Follow-up Appointments:   Provider: Becki Avery (PCP)  Make an appointment with you medical provider independently if you have any medical concerns or issues.    Date/Time: 6/28/19 at 11:00am  Provider: Linh Hamilton (Psychiatry)  Address: [address] [place] [address]  Phone: [phone]  Fax: [phone]    Date/Time: 6/28/19 at 10:00am   Provider: Deena Ascencio (Therapy)  Address: [address] [place] [address]  Phone: [phone]  Fax: [phone]    Pt has demonstrated ability to self-direct in making her own follow-up appointments in the past and patient has agreed to work with [facility] to set up her future appointments.  Attend all scheduled appointments with your outpatient providers. Call at least 24 hours in advance if you need to reschedule an appointment to ensure continued access to your outpatient providers.   Major Treatments, Procedures and Findings:  You were provided with: a psychiatric assessment, assessed for medical stability, medication evaluation and/or management, group therapy and milieu management    Symptoms to Report: feeling more aggressive, losing more sleep, mood getting worse or thoughts of suicide    Early warning signs can include: increased depression or anxiety sleep disturbances increased thoughts or behaviors of suicide or self-harm  increased unusual thinking, such as paranoia or hearing voices    Safety and Wellness:  Take all medicines as directed.  Make no changes unless your doctor suggests them.      Follow  "treatment recommendations.  Refrain from alcohol and non-prescribed drugs.  If there is a concern for safety, call 911.    Resources:   Crisis Intervention: 472.913.2507 or 692-327-6661 (TTY: 843.555.8061).  Call anytime for help.  [employer] [place] [facility] Mental Illness (www.[place].[facility].org): 627.474.3758 or 473-838-4407.  Self- Management and Recovery Training., [place]-- Toll free: 139.901.4825  www.[employer]  [facility] Crisis (COPE) Response - Adult 513 561-7418  Text 4 Life: txt \"LIFE\" to 55621 for immediate support and crisis intervention  Crisis text line: Text \"[place]\" to 985137. Free, confidential, 24/7.  Crisis Intervention: 353.120.9553 or 433-791-1574. Call anytime for help.       The treatment team has appreciated the opportunity to work with you.     If you have any questions or concerns our unit number is [phone]  You may be receiving a follow-up phone call within the next three days from a representative from behavioral health.    You have identified the best phone number to reach you as [phone]      "

## 2019-06-11 NOTE — PLAN OF CARE
Pt was discharged into the care of self. Patient was sent by cab to her wife's work. She will get farrukh ID and then go to the pharmacy to get her medications. She will Lyft from wife's work to pharm and home. Discharge teaching, including follow up care and medication teaching, complete. Patient is able to contract for safety and denies SI/SIB/HI. All belongings were returned to patient.

## 2019-06-11 NOTE — DISCHARGE SUMMARY
[facility], [place]   Psychiatric Discharge Summary      Ayaan Prasad MRN# [id number]   Age: 28 year old YOB: 1990     Date of Admission:  6/3/2019  Date of Discharge:  06/11/19  Admitting Physician:  Mahad Diez MD  Discharge Physician:  Mahad Diez MD         Summary/Hospital Course/Disposition:     Reason for Hospitalization: Ayana is a 28 year old female with history of schizoaffective disorder (bipolar type), cannabis use disorder, benzodiazepine use disorder, hallucinogen use disorder, methamphetamine use disorder, PTSD, Borderline Personality Disorder who was admitted on a voluntary basis for increased activated manic symptoms in the community.       Hospital Course:   (Initial Assessment): Patient seems to have had decompensation of her manic/psychotic symptoms in the context of poor medication compliance, possibly inadequate medication regimen (inadequate coverage/stability for her specific level of manic/psychoic symptoms), and possible other stressors. Substance induced issues cannot be ruled out at this point since a utox has not yet been collected. Since the patient has found some benefit in Stelazine, we will continue with this medication for now, while adding on Lithium for further mood stability. BUN/Cr within normal limits.         Update 6/6: Patient continues to have AH that are commanding (telling her to run or hurt herself). She does not seem to find much benefit with being on Stelazine. I discussed possibly trying a neuroleptic she has not tried yet , such as Vraylar. I will confirm with pharmacy liason to make sure the patient can be prescribed this medications without any authorization trouble before I prescribe it.      Update 6/7: Patient open to take Vraylar as a substitute for for Stelazine. She continues to utilize her coping skills to help attenuate her AH. Tolerating Lithium thusfar, will obtain level on Sunday. \     Update 6/8:  Patient exhibited more elevated, hyperactive/impulsive behaviors over the weekend, which (as per staff observation), appeared to be more in lines with her personality disorder rather than haven. She wanted to be on a long acting injectable, preferably 1 month acting. We discussed Prolixin, which she was open with.  Lithium level was lower end of normal, thus dose will be inreased.     Discharge Day: As noted above, the patient did ramp up behaviors that appeared to be more volitional/characterological in pathology, likely secondary gain being attention from nursing/staff/providers. She told staff she wanted to get a code 21, which was likely due to the increased staff attention/scrutiny that comes from it. AH/elevated manic behaviors seem to be relatively lower, and not at a dysfunctional level (her main behavioral pathology stems from personality/charactetological issues). The patient requested to be discharged. She does have a restricted provider and pharmacy. I had called her insurance and lifted the provider restriction so I could send her medications at the [facility] pharmacy. She denied any SI/HI/AH/VH. Regarding future psychiatric recommendations, she wants to get on the Prolixin Decanoate injection; she has started on the oral formulation recently. Regarding her upcoming ganglion cysts surgery, she is psychiatrically stable at this point to get her orthopedic surgery procedure.            DIagnoses:   Schizoaffective disorder bipolar type  Posttraumatic stress disorder  Social anxiety disorder  Borderline personality disorder  Tobacco use disorder  History of traumatic brain injury         Labs:   No results found for this or any previous visit (from the past 24 hour(s)).         Consults:   None            Discharge Medications:        Review of your medicines      START taking      Dose / Directions   eszopiclone 2 MG tablet  Commonly known as:  LUNESTA  Used for:  Bipolar 1 disorder, manic,  mild, Schizoaffective disorder, bipolar type (H)      Dose:  2 mg  Take 1 tablet (2 mg) by mouth nightly as needed for sleep  Quantity:  30 tablet  Refills:  0     fluPHENAZine 1 MG tablet  Commonly known as:  PROLIXIN  Used for:  Bipolar 1 disorder, manic, mild, Schizoaffective disorder, bipolar type (H)      Dose:  1 mg  Take 1 tablet (1 mg) by mouth 2 times daily  Quantity:  60 tablet  Refills:  1     guanFACINE 1 MG Tb24 24 hr tablet  Commonly known as:  INTUNIV  Used for:  Attention deficit hyperactivity disorder (ADHD), combined type, Bipolar 1 disorder, manic, mild, Schizoaffective disorder, bipolar type (H)      Dose:  1 mg  Start taking on:  6/12/2019  Take 1 tablet (1 mg) by mouth daily  Quantity:  30 tablet  Refills:  1     hydrOXYzine 50 MG tablet  Commonly known as:  ATARAX  Used for:  Bipolar 1 disorder, manic, mild      Dose:  50 mg  Take 1 tablet (50 mg) by mouth every 4 hours as needed for anxiety  Quantity:  30 tablet  Refills:  1     * lithium  MG CR tablet  Commonly known as:  LITHOBID  Used for:  Schizoaffective disorder, bipolar type (H)      Dose:  600 mg  Take 2 tablets (600 mg) by mouth At Bedtime  Quantity:  60 tablet  Refills:  1     * lithium  MG CR tablet  Commonly known as:  LITHOBID  Used for:  Schizoaffective disorder, bipolar type (H)      Dose:  300 mg  Start taking on:  6/12/2019  Take 1 tablet (300 mg) by mouth daily  Quantity:  30 tablet  Refills:  1     nicotine 21 MG/24HR 24 hr patch  Commonly known as:  NICODERM CQ  Used for:  Tobacco abuse      Dose:  1 patch  Start taking on:  6/12/2019  Place 1 patch onto the skin daily  Quantity:  30 patch  Refills:  1     nicotine polacrilex 4 MG gum  Commonly known as:  NICORETTE  Used for:  Tobacco abuse      Dose:  4 mg  Place 1 each (4 mg) inside cheek every hour as needed for other (nicotine withdrawal symptoms)  Quantity:  100 tablet  Refills:  1     OLANZapine 10 MG tablet  Commonly known as:  zyPREXA  Used for:   Schizoaffective disorder, bipolar type (H)      Dose:  10 mg  Take 1 tablet (10 mg) by mouth 2 times daily as needed (Agitation)  Quantity:  60 tablet  Refills:  1     ondansetron 4 MG tablet  Commonly known as:  ZOFRAN  Used for:  Tobacco abuse      Dose:  4 mg  Take 1 tablet (4 mg) by mouth every 8 hours as needed for nausea  Quantity:  30 tablet  Refills:  1     traZODone 50 MG tablet  Commonly known as:  DESYREL  Used for:  Schizoaffective disorder, bipolar type (H)      Dose:  50 mg  Take 1 tablet (50 mg) by mouth nightly as needed for sleep  Quantity:  30 tablet  Refills:  1         * This list has 2 medication(s) that are the same as other medications prescribed for you. Read the directions carefully, and ask your doctor or other care provider to review them with you.            CONTINUE these medicines which may have CHANGED, or have new prescriptions. If we are uncertain of the size of tablets/capsules you have at home, strength may be listed as something that might have changed.      Dose / Directions   gabapentin 300 MG capsule  Commonly known as:  NEURONTIN  This may have changed:      medication strength    how much to take  Used for:  Bipolar 1 disorder, manic, mild      Dose:  300 mg  Take 1 capsule (300 mg) by mouth 3 times daily  Quantity:  90 capsule  Refills:  1     LORazepam 1 MG tablet  Commonly known as:  ATIVAN  This may have changed:      medication strength    how much to take    additional instructions  Used for:  Bipolar 1 disorder, manic, mild, Tobacco abuse, Schizoaffective disorder, bipolar type (H)      Dose:  1 mg  Take 1 tablet (1 mg) by mouth 2 times daily as needed for anxiety  Quantity:  60 tablet  Refills:  0        CONTINUE these medicines which have NOT CHANGED      Dose / Directions   norelgestromin-ethinyl estradiol 150-35 MCG/24HR patch  Commonly known as:  ORTHO EVRA  Used for:  Menorrhagia with regular cycle      Remove old patch and apply new patch onto the skin once a  week for 3 weeks (21 days). Ok to wear consistently  Quantity:  12 patch  Refills:  3        STOP taking    trifluoperazine 2 MG tablet  Commonly known as:  STELAZINE              Where to get your medicines      These medications were sent to [facility] [place]  [place], [place] - [address]  [address], [place] [address]    Phone:  [phone]     fluPHENAZine 1 MG tablet    gabapentin 300 MG capsule    guanFACINE 1 MG Tb24 24 hr tablet    hydrOXYzine 50 MG tablet    lithium  MG CR tablet    lithium  MG CR tablet    nicotine 21 MG/24HR 24 hr patch    nicotine polacrilex 4 MG gum    OLANZapine 10 MG tablet    ondansetron 4 MG tablet    traZODone 50 MG tablet     Some of these will need a paper prescription and others can be bought over the counter. Ask your nurse if you have questions.    Bring a paper prescription for each of these medications    eszopiclone 2 MG tablet    LORazepam 1 MG tablet              Mental Status Examination:   Appearance: Overweight appearing white female. Hair is cut short.  Wearing head phones.   Attitude:  Somewhat cooperative.   Eye Contact:  fair  Mood:  anxious  Affect:  mood congruent  Speech:  clear, coherent  Language: fluent and intact in English  Psychomotor, Gait, Musculoskeletal:  no evidence of tardive dyskinesia, dystonia, or tics  Throught Process:  linear  Associations:  no loose associations  Thought Content:   Some mild AH, which have improved. Mild fleeting. self injurious ideation. Denies any VH/SI/HI.   Insight:  limited  Judgement:  fair  Oriented to:  time, person, and place  Attention Span and Concentration:  Poor - Fair   Recent and Remote Memory:  Fair   Fund of Knowledge:  Average          Discharge Plan:       - [facility]     Mahad Diez MD  [facility]

## 2019-06-11 NOTE — PROGRESS NOTES
06/10/19 2135   Behavioral Health   Hallucinations auditory   Thinking distractable;poor concentration   Orientation person: oriented;place: oriented;date: oriented;time: oriented   Memory baseline memory   Insight poor   Judgement impaired   Eye Contact at examiner   Affect full range affect   Mood anxious;other (see comments)  (excitable)   Physical Appearance/Attire appears stated age;attire appropriate to age and situation   Hygiene other (see comment)  (adequate)   Suicidality other (see comments)  (denies)   1. Wish to be Dead No   Change in Protective Factors? No   Enviromental Risk Factors None   Self Injury other (see comment)  (denies)   Elopement   (none stated or observed)   Activity hyperactive (agitated, impulsive)   Speech clear;coherent   Medication Sensitivity no stated side effects;no observed side effects   Psychomotor / Gait steady;balanced   Daily Care   Activity up ad joshua   Patient Performed Hygiene other (see comments)  (per pt)   Activities of Daily Living   Hygiene/Grooming independent   Oral Hygiene independent   Dress street clothes;independent   Laundry with supervision   Room Organization independent     Pt was hyperactive today. Pt began shift by fixating on art supplies that were locked in an OT closet. Pt stated that an unnamed nurse had promised the pt that they could use the art supplies on the later shift. Suspected staff splitting as pt became angry and swore when the supplies were declined as we do not have access to the OT art supplies during free hours. Pt was also hyper focused on the many code 21s that were taking place on the unit. Pt began fixating on the idea of being placed in seclusion and threatened to code if not brought to seclusion by staff. Pt was eventually redirected by a visitor and seemed to calm down a bit for the remainder of the evening. Pt complains about medication making her very hungry. Pt remains anxious (5) and is hearing some verbal hallucinations  but denies SI and SIB and depression.

## 2019-06-11 NOTE — PLAN OF CARE
"Writer came out of team meeting and patent stated, \"Finally! I have been waiting to talk to you.\" At this time a code was called on another patient. She then threw her ice pack at the wall. Writer and doctor tried to talk with patient. She refused to go to room but asked to go to the group room. She was allow but did not participate. She only paced in the room. Once Code was under control she was asked to leave the group room. She came and sat and talked to writer and expressed her frustration on having a roommate and having a RN assigned for her staff because, \"they can't always talk when I want to.\" She is also feeling like she is ready to leave and asked to go. She would like to start the [facility] on an outpatient bases. She was given an option to talk with the doc or to sign a 12 hour intent. She asked to sign the 12 hour intent but asked, \"Can I rescind it if I want\". She chose to sign the 12 hour intent. Doc was notified.      "

## 2019-06-11 NOTE — TELEPHONE ENCOUNTER
----- Message from Heriberto Bustamante LPN sent at 6/10/2019  3:10 PM CDT -----  Regarding: Call  Caller: [facility]    Relationship to Patient: surgical unit    Call Back Number:     Reason for Call: FYI, patient is currently inpatient somewhere. She was supposed to have hand surgery at the [facility] but this was canceled until she is cleared by psych. I told this woman that she is not scheduled with us but told her I would send a message to you. She will try to reach out to the patient as well.
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
## 2019-06-11 NOTE — PROGRESS NOTES
The following appointment were made for patient post-discharge and AVS has been completed.    Date/Time: 6/28/19 at 11:00am  Provider: Linh Hamilton (Psychiatry)  Address: [address] [place] [address]  Phone: [phone]  Fax: [phone]    Date/Time: 6/28/19 at 10:00am   Provider: Deena Ascencio (Therapy)  Address: [address] [place] [address]  Phone: [phone]  Fax: [phone]    [facility] met with patient to confirm discharge information and get permission to set up appointments for her.

## 2019-06-13 ENCOUNTER — TELEPHONE (OUTPATIENT)
Dept: FAMILY MEDICINE | Facility: CLINIC | Age: 29
End: 2019-06-13

## 2019-06-13 ENCOUNTER — OFFICE VISIT (OUTPATIENT)
Dept: PSYCHIATRY | Facility: CLINIC | Age: 29
End: 2019-06-13
Payer: COMMERCIAL

## 2019-06-13 DIAGNOSIS — F25.0 SCHIZOAFFECTIVE DISORDER, BIPOLAR TYPE (H): Primary | ICD-10-CM

## 2019-06-13 NOTE — TELEPHONE ENCOUNTER
pt called asking why Gena had her surgery cancelled   Review of chart        6/10/19 3:11 PM      Psychiatry Clinic  contacted Joshua Jansen Morgan        6/10/19 3:11 PM   Note      Cheryle from [facility] control desk called to inform us that patients surgery with Dr. Richardson for 6/14 needed to be canceled.      Patient must have a physiatric eval done prior to surgery.      Called Psychiatry clinic patient was attending, and informed the nurse.           Chart to Gena, per review it appears Psychiatry  cancelled pt surgery , not Gena    Spoke with  Patient  , advised as above , she will call the Navigation team and get scheduled for evaluation so she can get surgery rescheduled  She was very calm   ANTHONY Evans  RN/[place]

## 2019-06-14 ENCOUNTER — ALLIED HEALTH/NURSE VISIT (OUTPATIENT)
Dept: PHARMACY | Facility: CLINIC | Age: 29
End: 2019-06-14
Attending: PSYCHIATRY & NEUROLOGY
Payer: COMMERCIAL

## 2019-06-14 DIAGNOSIS — G47.00 INSOMNIA, UNSPECIFIED TYPE: ICD-10-CM

## 2019-06-14 DIAGNOSIS — F90.2 ATTENTION DEFICIT HYPERACTIVITY DISORDER (ADHD), COMBINED TYPE: ICD-10-CM

## 2019-06-14 DIAGNOSIS — Z30.9 ENCOUNTER FOR CONTRACEPTIVE MANAGEMENT, UNSPECIFIED TYPE: ICD-10-CM

## 2019-06-14 DIAGNOSIS — R11.0 NAUSEA: ICD-10-CM

## 2019-06-14 DIAGNOSIS — F41.9 ANXIETY: ICD-10-CM

## 2019-06-14 DIAGNOSIS — F25.0 SCHIZOAFFECTIVE DISORDER, BIPOLAR TYPE (H): Primary | ICD-10-CM

## 2019-06-14 PROCEDURE — 99607 MTMS BY PHARM ADDL 15 MIN: CPT | Performed by: PHARMACIST

## 2019-06-14 PROCEDURE — 99605 MTMS BY PHARM NP 15 MIN: CPT | Performed by: PHARMACIST

## 2019-06-14 RX ORDER — ACETAMINOPHEN 325 MG/1
325-650 TABLET ORAL EVERY 6 HOURS PRN
COMMUNITY
End: 2019-06-24

## 2019-06-14 NOTE — PROGRESS NOTES
SUBJECTIVE/OBJECTIVE:                Ayana Prasad is a 28 year old female called for a transitions of care visit.  She was discharged from [facility] on 6/11/19 for increased manic symptoms.     Chief Complaint: SOUTH visit - having dry mouth    Allergies/ADRs: Reviewed in Epic  Tobacco: Chewing tobacco Tobacco Cessation Action Plan: Pharmacotherapies : Nicotine patch; thinking about quitting tomorrow and will be picking up NRT today  Alcohol: not currently using  Caffeine: 2-3 energy drinks/week  Activity: not discussed  PMH: Reviewed in Epic    Medication Adherence/Access:  Patient takes medications directly from bottles.  Patient takes medications 3 time(s) per day.   Per patient, misses medication a few times per week (forgets the mid-day dose of gabapentin).   Medication barriers: none.   The patient fills medications at [facility]: YES.    Schizoaffective disorder bipolar type/ADHD: Currently taking fluphenazine 1 mg twice daily (will be switching to IM injection form if tolerates oral), guanfacine ER 1 mg daily (for ADHD), lithium  mg in the morning/600 mg at night, and olanzapine 10 mg twice daily as needed for agitation. Patient states that fluphenazine and guanfacine are new medications for her. She previously was on lithium in the past and has also been on olanzapine in the past. States she is seeing psychiatry on Monday for follow up and is looking forward to switchin to IM medication, if possible. Reports possible side effects of dry mouth, hand tremor for 2 days (has dropped things because of this but it's reportedly not too bothersome), and dizziness (admits she has been out in the sun without drinking enough water), all of which started after her medications changed. She typically takes olanzapine about twice/week and states it is effective. She reports that she had QT prolongation on Seroquel in the past.        BP Readings from Last 3 Encounters:   06/10/19 113/72   06/03/19 140/72    05/31/19 102/70     Anxiety: Currently taking hydroxyzine 50 mg as needed (about 2-3 times weekly) and lorazepam 1 mg as needed (about once daily). Patient states that lorazepam is effective but hydroxyzine is not. Neither medication reportedly make her sleepy. Patient also takes gabapentin 300 mg 2-3 times daily for anxiety, though she states it was originally prescribed for nerve pain, which has resolved. She is unsure if it has been effective.     Nausea: Currently taking pro+prebiotic daily and ondansetron 4 mg as needed, about 4-5 times weekly. Patient states that the nausea is ongoing, this is not new.    Insomnia: Currently taking Lunesta 2 mg and trazodone 50 mg, both as needed. She takes both together when she feels she needs something to sleep and this is a few nights/week. Patient reports that this combination works well for her. She denies a hangover effect in the morning and denies sleep behaviors such as sleep walking/sleep eating.    Contraception: Not currently taking any medications. She has the Ortho Evra patch, but stopped it a few weeks ago due to concern that the hormones/menstrual cycle was contributing to her mood changes. She hopes to go back on the patch after her psych meds are stable.     ASSESSMENT:                 Current medications were reviewed today.      Medication Adherence: excellent, no issues identified    Schizoaffective disorder bipolar type/ADHD: Needs improvement. Patient may be experiencing side effects from the new medications. Guanfacine could be causing dry mouth and fluphenazine may be causing tremor. for dry mouth, advised that patient chew gum, suck on sour candies, or use Biotene products for dry mouth and to continue to monitor the tremor. If these symptoms become bothersome, the medications may need to be changed, but will defer to psychiatry for any adjustments. Seems likely that dizziness is due to dehydration, so I advised that the patient drink more water and  have her blood pressure checked if the dizziness persists. Given her history of QT prolongation, she would benefit from an EKG now that she has been on fluphenazine, which also carries this risk when used with hydroxyzine, ondansetron, trazodone, and olanzapine.     Anxiety: Appears stable.     Nausea: Appears stable.    Insomnia: Appears stable.     Contraception: Needs improvement. Patient to follow up with PCP regarding initiating of hormonal medication if desired after psychiatric medications are stable.     PLAN:                Post Discharge Medication Reconciliation Status: discharge medications reconciled, continue medications without change.    1. Fluphenazine (Prolixin) may be causing tremor; continue to monitor and discuss with psychiatrist.  2. Guanfacine may be causing dry mouth. Consider trying to chew gum, suck on sour candies, or try Biotene products, which can be purchased over the counter.   3. EKG should be checked again now that you are on new medications. Discuss with PCP or psychiatrist.  4. Increase water intake as the dizziness might be due to dehydration.     I spent 25 minutes with this patient today. I offer these suggestions for consideration by Dr. Flores and BROOKLYN Edwards. A copy of the visit note was provided to the patient's primary care provider and psychiatry provider.    Will follow up as needed.    The patient was sent via [employer] a summary of these recommendations as an after visit summary.    Lauren Edwards, Pharm.D.  Medication Therapy Management Pharmacist  Phone: [phone]

## 2019-06-14 NOTE — Clinical Note
Dr. Flores, I did a [facility] transitions of care call with this patient and it appears she may be experiencing some side effects of her new medications. She doesn't seem too bothered by these, but I wanted to give you the head's up for your follow up with her on Monday. I also think it would be a good idea to check an EKG now that her medications have changed given her history of QT prolongation on Seroquel in the past. I sent a message to her primary about the EKG as well. Lauren Edwards, Pharm.D.Medication Therapy Management PharmacistPhone: [phone]

## 2019-06-14 NOTE — Clinical Note
Jarad Connell,I did a [facility] transitions of care call with this patient. Because she has a history of QT prolongation on Seroquel, it may be good to check EKG again now that she is on fluphenazine. Let me know if you have any questions or if I can help with anything else. Lauren Edwards, Pharm.D.Medication Therapy Management PharmacistPhone: [phone]

## 2019-06-14 NOTE — PATIENT INSTRUCTIONS
Recommendations from today's MTM visit:                                                    MTM (medication therapy management) is a service provided by a clinical pharmacist designed to help you get the most of out of your medicines.     1. Fluphenazine (Prolixin) may be causing tremor; continue to monitor and discuss with psychiatrist.  2. Guanfacine may be causing dry mouth. Consider trying to chew gum, suck on sour candies, or try Biotene products, which can be purchased over the counter.   3. EKG should be checked again now that you are on new medications. Discuss with PCP or psychiatrist.  4. Increase water intake as the dizziness might be due to dehydration.     Next MTM visit: as needed    To schedule another MTM appointment, please call the clinic directly or you may call the MTM scheduling line at [phone] or toll-free at [phone].     My Clinical Pharmacist's contact information:                                                      It was a pleasure talking with you today!  Please feel free to contact me with any questions or concerns you have.      Lauren Edwards, Pharm.D.  Medication Therapy Management Pharmacist  Phone: [phone]    You may receive a survey about the MTM services you received by email and/or US Mail.  I would appreciate your feedback to help me serve you better in the future. Your comments will be anonymous.

## 2019-06-14 NOTE — PROGRESS NOTES
NAVIGATE SEE Progress Note   For Supported Employment & Education    NAVIGATE Enrollee: Ayana Prasad (1990)     MRN: [id number]  Date:  6/13/19  Clinician: [employer] Supported Employment & , Linh Hamilton    1. Client Status Update:   Ayana Prasad is interested in employment (Client had interview with potential employer)    2. People present:   SEE/Writer  Client: Ayana Prasad      3. Total number of persons who participated in contact: (do not count yourself/SEE) 1    4. Length of Actual Contact: 30 minutes   Traveled? No    5. Location of contact:  Telephone    6. Brief description of session, contact, or client status (include: strategies, interventions, client reaction to contact, next steps, etc)    Writer called Ayana at her request to prepare for a second interview at a [employer] in [place]. Ayana and this writer discussed interview tips, relaxation techniques and disclosure. Ayana reports she has struggled to keep jobs in the past but feels 'really good about this place' and wants to be more forthright with her symptoms if she gets the job. 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   Encouraged her to think of what types of accommodations she is looking for and to consider waiting until a job offer to disclose a disability. We did some role play and Ayana said she felt confident. Later in the day, Ayana emailed this writer stating that she was offered the job. Will meet next week to discuss employment prep.    7. Completion of mutually agreed upon client task from previous meeting:  Completed    8. Orientation and Treatment Planning:  Pursuing current SEE goals    9. Assessment:  Assisting client to visit work or school settings to develop client preferences and goals re: work and/or school    10. Placement:  Employment  (Interview preparation/skills training)    11. Follow Along Supports:  (for clients who are working or attending school)   Not Applicable    12. Mutually agreed upon client task for next meeting:     na    13. Next Meeting Scheduled for: miah [employer] Supported Employment &

## 2019-06-17 ENCOUNTER — OFFICE VISIT (OUTPATIENT)
Dept: PSYCHIATRY | Facility: CLINIC | Age: 29
End: 2019-06-17
Payer: COMMERCIAL

## 2019-06-17 ENCOUNTER — TELEPHONE (OUTPATIENT)
Dept: ORTHOPEDICS | Facility: CLINIC | Age: 29
End: 2019-06-17

## 2019-06-17 VITALS
SYSTOLIC BLOOD PRESSURE: 138 MMHG | WEIGHT: 211.6 LBS | HEART RATE: 78 BPM | DIASTOLIC BLOOD PRESSURE: 89 MMHG | BODY MASS INDEX: 33.14 KG/M2

## 2019-06-17 DIAGNOSIS — Z51.81 ENCOUNTER FOR THERAPEUTIC DRUG MONITORING: Primary | ICD-10-CM

## 2019-06-17 DIAGNOSIS — F25.0 SCHIZOAFFECTIVE DISORDER, BIPOLAR TYPE (H): Primary | ICD-10-CM

## 2019-06-17 DIAGNOSIS — F19.10 POLYSUBSTANCE ABUSE (H): ICD-10-CM

## 2019-06-17 DIAGNOSIS — F25.0 SCHIZOAFFECTIVE DISORDER, BIPOLAR TYPE (H): ICD-10-CM

## 2019-06-17 ASSESSMENT — ANXIETY QUESTIONNAIRES
7. FEELING AFRAID AS IF SOMETHING AWFUL MIGHT HAPPEN: NOT AT ALL
GAD7 TOTAL SCORE: 8
2. NOT BEING ABLE TO STOP OR CONTROL WORRYING: SEVERAL DAYS
5. BEING SO RESTLESS THAT IT IS HARD TO SIT STILL: NOT AT ALL
6. BECOMING EASILY ANNOYED OR IRRITABLE: NOT AT ALL
4. TROUBLE RELAXING: NEARLY EVERY DAY
1. FEELING NERVOUS, ANXIOUS, OR ON EDGE: MORE THAN HALF THE DAYS
3. WORRYING TOO MUCH ABOUT DIFFERENT THINGS: MORE THAN HALF THE DAYS

## 2019-06-17 ASSESSMENT — PATIENT HEALTH QUESTIONNAIRE - PHQ9: SUM OF ALL RESPONSES TO PHQ QUESTIONS 1-9: 3

## 2019-06-17 NOTE — PATIENT INSTRUCTIONS
-Get lithium level - go to lab 12 hours after evening dose and do not take morning dose until after blood draw  -Stop guanfacine for now and see how dry mouth dose  Continue other medications   We will call you about scheduling first Prolixin injection  You can contact Linh if you need help with documents for courts

## 2019-06-17 NOTE — PROGRESS NOTES
"NA[facility]IGATE Medication Management Progress Note  A Part of the [facility] First Episode of Psychosis Program    NAVIGATE Enrollee: Ayana Prasad (1990)     MRN: [id number]  Date:  6/17/19         Contributors to the Assessment     Chart Reviewed.   Interview completed with Ayana Prasad.         Chief Complaint     \"Content\"          Interim History      Ayana reports that her mood has stabilized since discharge.  She reports that currently, her mood is \"content,\" and feels that things are going okay with the Prolixin.  She is sleeping well, 7 to 8 hours a night, sometimes 12.  She reports that the combination of trazodone and Lunesta has worked well but she has not needed it every night.  She is taking Ativan occasionally for anxiety, 4 or 5 times a week.  She reports often feeling anxious and worried, and having trouble unwinding.  She report feeling nervous \"about everything\" but primarily feeling nervous about school and work; she tried taking guanfacine for her schoolwork, but reports that it caused severe dry mouth.  She reports that she continues to walk for exercise, but only a mile or 2 a day.  She reports that her auditory hallucinations have improved compared to when she was in the hospital, and there are no command hallucinations, \"just chatting\" all day, which sometimes does affect her concentration. No SI. She reports she is starting a new job tomorrow, and feeling a little nervous about this.  She had a court date this morning for disorderly conduct and has a pretrial hearing in July.  She is amenable to talking about this with SEE here. currently, her license is suspended and she should not drive.  She needs to participate in a diversion program.    PSYCH ROS:  Clinician Rating Form in COMPASS  1. Depressed Mood: Rating n  0 Not reported    1 Very mild occasionally feels sad or  down ; of questionable clinical significance   2 Mild occasionally feels moderately depressed or often feels sad or  down    3 " Moderate occasionally feels very depressed or often feels moderately depressed   4 Moderately severe often feels very depressed   5 Severe feels very depressed most of the time   6 Very severe constant extremely painful feelings of depression   U Unable to assess      2. Anxiety/Worry: Rating: 3  0 Not reported    1 Very mild occasionally feels a little anxious; of questionable clinical significance   2 Mild occasionally feels moderately anxious or often feels a little anxious or worried   3 Moderate occasionally feels very anxious or often feels moderately anxious   4 Moderately severe often feels very anxious or often feels moderately anxious   5 Severe feels very anxious or worried most of the time   6 Very severe patient is continually preoccupied with severe anxiety   U Unable to assess      3. Suicidal Ideation/Behavior: Ratin   0 Not reported    1 Very mild occasional thoughts of dying,  I d be better off dead  or  I wish I were dead    2 Mild frequents thoughts of dying or occasional thoughts of killing self, without plan or method   3 Moderate often thinks of suicide or has though of a specific method   4 Moderately severe has mentally rehearsed a specific method of suicide or has made a suicide attempt with questionable intent to die (e.r. takes aspirins and then tells family)   5 Severe has made preparations for a potentially lethal suicide attempt (e.g acquires a gun and bullets for an attempt)   6 Very severe has made a suicide attempt with an intent to die   U Unable to assess       4. Elevated/Expansive Mood: Ratin  0 Not at all    1 Very mild questionable; more cheerful than most people in his/her circumstances but of only possible clinical significance   2 Mild brief elevated/expansive mood but only somewhat out of proportion to the circumstances   3 Moderate brief/occasional elevation of mood which is clearly out of proportion to the circumstances   4 Moderately severe sustained/frequent  elevation of mood which is clearly out of proportion to the circumstances   5 Severe mood is euphoric most of the time   6 Very severe sustained elevation;  everything is wonderful  almost all of the time   U Unable to assess      5. Hostility/Anger/Irritability/Aggressiveness: Ratin  0 Not at all    1 Very mild occasional irritability of doubtful clinical significance   2 Mild occasionally feels angry or mild or indirect expressions of anger, e.g. sarcasm, disrespect or hostile gestures   3 Moderate frequently feels angry, frequent irritability or occasional direct expression of anger, e.g. yelling at others   4 Moderately severe often feels very angry, often yells at others or occasionally threatens to harm others   5 Severe has acted on his anger by becoming physically abusive on one or two occasions or makes frequent threats to harm others or is very angry most of the time   6 Very severe has been physically aggressive and/or required intervention to prevent assaultiveness on several occasions; or any serious assaultive act   U Unable to assess      6. Impulsive Behavior: Ratin  0 Not at all    1 Very mild one instance of impulsive behavior which is of doubtful clinical significance   2 Mild occasional impulsive acts, e.g. making phone calls at odd hours   3 Moderate occasional impulsive acts with some potential negative consequence, e.g. leaving work abruptly; changing plans without thinking   4 Moderately severe impulsive acts with definite negative consequences, e.g. overspending on non-essentials; repeated reckless sexual behavior   5 Severe impulsive acts with direct negative consequences, e.g. spends entire income on nonessentials without regard for basic needs   6 Very severe impulsive behavior which is potentially life threatening, e.g. jumps from dangerous height (without suicidal intent) or criminal behavior, e.g. impulsive robbery   U Unable to assess      7. Suspiciousness: Ratin  0 Not  present    1 Very mild Seems on guard. Reluctant to respond to some  personal  questions. Reports being overly self-conscious in public   2 Mild Describes incidents in which others have harmed or wanted to harm him/her that sound plausible. Patient feels as if others are watching, laughing, or criticizing him/her in public, but this occurs only occasionally or rarely. Little or no preoccupation   3 Moderate Says others are talking about him/her maliciously, have negative intentions, or may harm him/her. Beyond the likelihood of plausibility, but not delusional. Incidents of suspected persecution occur occasionally (less than once per week) with some preoccupation   4 Moderately severe Same as 3, but incidents occur frequently such as more than once a week. Patient is moderately preoccupied with ideas of persecution OR patient reports persecutory delusions expressed with much doubt (e.g. partial delusion)   5 Severe Delusional -- speaks of Mafia plots, the FBI, or others poisoning his/her food, persecution by supernatural forces   6 Extremely severe Same as 5, but the beliefs are bizarre or more preoccupying. Patient tends to disclose or act on persecutory delusions.   U Unable to assess      8. Unusual Thought Content: Ratin  0 Not present    1 Very mild Ideas of reference (people may stare or may laugh at him), ideas of persecution (people may mistreat him). Unusual beliefs in psychic hughes, spirits, UFOs, or unrealistic beliefs in one's own abilities. Not strongly held. Some doubt   2 Mild Same as 1, but degree of reality distortion is more severe as indicated by highly unusual ideas or greater conviction. Content may be typical of delusions (even bizarre), but without full conviction. The delusion does not seem to have fully formed, but is considered as one possible explanation for an unusual experience   3 Moderate Delusion present but no preoccupation or functional impairment. May be an encapsulated  delusion or a firmly endorsed absurd belief about past delusional circumstances   4 Moderately severe Full delusion(s) present with some preoccupation OR some areas of functioning disrupted by delusional thinking   5 Severe Full delusion(s) present with much preoccupation OR many areas of functioning are disrupted by delusional thinking   6 Extremely severe Full delusions present with almost   U Unable to assess      9. Hallucinations: Rating: 3  0 Not present    1 Very mild While resting or going to sleep, sees visions, smells odors, or hears voices, sounds or whispers in the absence of external stimulation, but no impairment in functioning   2 Mild While in a clear state of consciousness, hears a voice calling the subject s name, experiences non-verbal auditory hallucinations (e.g., sounds or whispers), formless visual hallucinations, or has sensory experiences in the presence of a modality-relevant stimulus (e.g., visual illusions) infrequently (e.g., 1-2 times per week) and with no functional impairment   3 Moderate Occasional verbal, visual, gustatory, olfactory, or tactile hallucinations with no functional impairment OR non-verbal auditory hallucinations/visual illusions more than infrequently or with impairment   4 Moderately severe Experiences daily hallucinations OR some areas of functioning are disrupted by hallucinations   5 Severe Experiences verbal or visual hallucinations several times a day OR many areas of functioning are disrupted by these hallucinations   6 Extremely severe Persistent verbal or visual hallucinations throughout the day OR most areas of functioning are disrupted by these hallucinations   U Unable to assess      10. Conceptual Disorganization: Ratin  0 Not present    1 Very mild Peculiar use of words or rambling but speech is comprehensible   2 Mild Speech a bit hard to understand or make sense of due to tangentiality, circumstantiality, or sudden topic shifts   3 Moderate Speech  difficult to understand due to tangentiality, circumstantiality, idiosyncratic speech, or topic shifts on many occasions OR 1-2 instances of incoherent phrases   4 Moderately severe Speech difficult to understand due to circumstantiality, tangentiality, neologisms, blocking, or topic shifts most of the time OR 3-5 instances of incoherent phrases   5 Severe Speech is incomprehensible due to severe impairments most of the time. Many PSRS items cannot be rated by self-report alone   6 Extremely severe Speech is incomprehensible throughout interview   U Unable to assess      11. Avolition/Apathy: Ratin  0 Not at all    1 Very mild questionable decrease in time spent in goal-directed activities   2 Mild spends less time in goal-directed activities than is appropriate for situation and age   3 Moderate initiates activities at times but does not follow through   4 Moderately severe rarely initiates activity but will passively engage with encouragement   5 Severe almost never initiates activities; requires assistance to accomplish basic activities   6 Very severe does not initiate or persist in any goal-directed activity even with outside assistance   U Unable to assess      12. Asociality/Low Social Drive: Ratin  0 Not at all    1 Very mild questionable   2 Mild slow to initiate social interactions but usually responds to overtures by others   3 Moderate rarely initiates social interactions; sometimes responds to overtures by others   4 Moderately severe does not initiate but sometimes responds to overtures by others; little social interaction outside close family members   5 Severe never initiates and rarely encourages conversations or activities; avoids being with others unless prodded, may have contacts with family   6 Very severe avoids being with others (even family members) whenever possible, extreme social isolation   U Unable to assess      13. Adherence: Days: 0  The longest, continuous amount of time in  "days, since the last visit when the subject did not take medication     14. EPS Part I: Ratin  Rate Elbow Rigidity for all subjects  0 Normal   1 Slight stiffness and resistance   2 Moderate stiffness and resistance   3 Marked rigidity with difficulty in passive movement   4 Extreme stiffness and rigidity with almost a frozen joint   U Unable to assess           EPS Part 2: Signs of EPS: 0  Are there are other signs of EPS (eg diminished arm swing, postural instability, cogwheeling, tremor, akinesia) present based upon patient report or exam?  0 No   1 Yes     15. Akathesia: Ratin  0 No restlessness reported or observed   1 Mild restlessness observed; e.g., occasional jiggling of the foot occurs when subject is seated   2 Moderate restlessness observed; e.g., on several occasions, jiggles foot, crosses and uncrosses legs or twists a part of the body   3 Restlessness is frequently observed; e.g., the foot or legs moving most of the time   4 Restlessness persistently observed; e.g., subject cannot sit still, may get up and walk   U Unable to assess     16. Dyskinetic Movement Ratings: Ratin  0 None   1 Minimal, may be extreme normal   2 Mild   3 Moderate   4 Severe   U Unable to assess     SIDE EFFECT ASSESSMENT:  Clinician Rating Form in COMPASS - Side Effect Assessment  Address side effects reported by the patient and rate using this scale  0 Not present   1 Minimal, may be extreme normal   2 Mild   3 Moderate   4 Severe   U Unable to assess   P Present, but not related     Feeling dizzy or faint: N  Blurred vision: N  Dry mouth: Y; \"incredible\" - started with starting guanfacine in hospital  Too much saliva/drooling: n  Nausea:  n  Constipation: n  Increased appetite: n  Weight gain: n  Weight loss: n  Feeling tired/fatigue: n  Daytime sedation: n  Hypersomnia: n  Insomnia: n  Low libido: n  Other problems with sex: n  Breast enlargement or discharge:  n  Irregular Menstruation or amenorrhea: n  Other " (please list and rate): hand tremors recently - now resolved    Medical review of systems: As noted above, otherwise negative.     RECENT SUBSTANCE USE:  Clinician Rating Form in [facility]PASS - Substance Use Assessment  Alcohol Use Severity: Ratin  0 none   1 use without impairment: drinks but no immediate social or medical impairment   2 use with impairment: e.g. becomes grossly intoxicated; alcohol use or withdrawal compromises school, work or social functioning; alcohol use or withdrawal exacerbates symptoms (e.g. gets depressed when drinking)     Marijuana Use Severity: Ratin  0 none   1 occasional use without impairment: e.g. uses marijuana a few days a month and has no immediate social or medical impairment   2 frequent use without impairment: e.g. uses marijuana several or more days a week but has no immediate social or medical impairment   3 use with impairment: e.g. becomes grossly intoxicated; marijuana use compromises school, work or social functioning; marijuana use exacerbates symptoms (e.g. gets paranoid when using)     Other Drug Use Severity: Rating: tobacco (dip) - 1  0 none   1 occasional use without impairment: e.g. uses drug(s) a few days a month and has no immediate social or medical impairment   2 frequent use without impairment: e.g. uses drug(s) several or more days a week but has no immediate social or medical impairment   3 use with impairment: e.g. becomes grossly intoxicated; drug use compromises school, work or social functioning; drug use exacerbates symptoms (e.g. gets paranoid when using)              Medical/Surgical History     Allergies   Allergen Reactions     Haldol [Haloperidol] Other (See Comments)     Makes patient very angry and anxious     Seroquel [Quetiapine] Palpitations     Spent 2 weeks in the hospital due to having seroquel, caused palpitations and QT prolongation     Adhesive Tape Hives     Percocet [Oxycodone-Acetaminophen] Nausea and Vomiting     Prednisone Other  (See Comments) and Hives     Suicidal ideation     Risperidone Other (See Comments)     Droperidol Anxiety       Patient Active Problem List   Diagnosis     ADHD (attention deficit hyperactivity disorder)     Bipolar 1 disorder, manic, mild     Marijuana abuse     Polysubstance abuse (H)     GERD (gastroesophageal reflux disease)     Tobacco abuse     Abdominal pain, right upper quadrant     Intractable back pain     Optic neuritis     Cauda equina syndrome with neurogenic bladder (H)     Schizoaffective disorder, bipolar type (H)     PTSD (post-traumatic stress disorder)     Anxiety     Auditory hallucinations     Class 2 obesity due to excess calories in adult     Nephrolithiasis     Cyst of left ovary     Borderline personality disorder (H)     Cannabis dependence (H)     Depression     Episodic mood disorder (H)     History of heroin abuse     Moderate episode of recurrent major depressive disorder (H)     Opioid use disorder, severe, dependence (H)     Substance-induced psychotic disorder with hallucinations (H)     History of methamphetamine abuse     Nausea     Overdose     Suicidal ideation     Bella (H)            Medications     Current Outpatient Medications   Medication Sig Dispense Refill     acetaminophen (TYLENOL) 325 MG tablet Take 325-650 mg by mouth every 6 hours as needed for mild pain       eszopiclone (LUNESTA) 2 MG tablet Take 1 tablet (2 mg) by mouth nightly as needed for sleep 30 tablet 0     fluPHENAZine (PROLIXIN) 1 MG tablet Take 1 tablet (1 mg) by mouth 2 times daily 60 tablet 1     gabapentin (NEURONTIN) 300 MG capsule Take 1 capsule (300 mg) by mouth 3 times daily 90 capsule 1     guanFACINE (INTUNIV) 1 MG TB24 24 hr tablet Take 1 tablet (1 mg) by mouth daily 30 tablet 1     hydrOXYzine (ATARAX) 50 MG tablet Take 1 tablet (50 mg) by mouth every 4 hours as needed for anxiety 30 tablet 1     lithium ER (LITHOBID) 300 MG CR tablet Take 1 tablet (300 mg) by mouth daily 30 tablet 1      "lithium ER (LITHOBID) 300 MG CR tablet Take 2 tablets (600 mg) by mouth At Bedtime 60 tablet 1     LORazepam (ATIVAN) 1 MG tablet Take 1 tablet (1 mg) by mouth 2 times daily as needed for anxiety 60 tablet 0     nicotine (NICODERM CQ) 21 MG/24HR 24 hr patch Place 1 patch onto the skin daily 30 patch 1     nicotine polacrilex (NICORETTE) 4 MG gum Place 1 each (4 mg) inside cheek every hour as needed for other (nicotine withdrawal symptoms) 100 tablet 1     norelgestromin-ethinyl estradiol (ORTHO EVRA) 150-35 MCG/24HR patch Remove old patch and apply new patch onto the skin once a week for 3 weeks (21 days). Ok to wear consistently (Patient not taking: Reported on 6/14/2019) 12 patch 3     OLANZapine (ZYPREXA) 10 MG tablet Take 1 tablet (10 mg) by mouth 2 times daily as needed (Agitation) 60 tablet 1     ondansetron (ZOFRAN) 4 MG tablet Take 1 tablet (4 mg) by mouth every 8 hours as needed for nausea 30 tablet 1     PREBIOTIC PRODUCT PO Take 1 capsule by mouth daily       traZODone (DESYREL) 50 MG tablet Take 1 tablet (50 mg) by mouth nightly as needed for sleep 30 tablet 1             Vitals     /89 (BP Location: Left arm, Patient Position: Chair, Cuff Size: Adult Large)   Pulse 78   Wt 96 kg (211 lb 9.6 oz)   LMP  (LMP Unknown)   BMI 33.14 kg/m        Weight prior to medication: 86.2 kg (190 lb) on 6/3/19         Mental Status Exam     Young female who appears stated age, normal grooming and casually dressed.  Alert and attentive, less fidgety but still somewhat restless in chair.  Better eye contact. Speech more normal rate, normal volume, rhythm, language fluent and coherent.  Mood \"content,\" affect still somewhat constricted but more appropriate.  Thought process linear and goal-directed with a few loose associations.  Thought content positive for recent AH with voices \"chatting\", no command AH, no SI/HI/VH.  Insight and judgment fair. Recent and remote memory intact. Attention normal. Cognition grossly " "intact, formal cognitive testing not done. Normal gait and station.         Labs and Data     PHQ9 TODAY   PHQ-9 SCORE 5/23/2019 6/3/2019 6/17/2019   PHQ-9 Total Score - - -   PHQ-9 Total Score 8 9 3       AVA-7 TODAY  AVA-7 SCORE 5/23/2019 6/3/2019 6/17/2019   Total Score - - -   Total Score 21 19 8           ANTIPSYCHOTIC LABS ROUTINE    [glu, A1C, lipids (focus LDL), liver enzymes, WBC, ANEU, Hgb, plts]   q12 mo  Recent Labs   Lab Test 06/04/19  1219 04/07/19  1744  11/05/18  1642  10/03/17  1226   GLC 94 106*   < >  --    < > 110*   A1C  --   --   --  5.8*  --  5.8    < > = values in this interval not displayed.     Recent Labs   Lab Test 10/03/17  1226 05/13/17  0752   CHOL 179 149   TRIG 246* 78   LDL 80 68   HDL 50 65     Recent Labs   Lab Test 06/04/19  1219 04/07/19  1744   AST 24 15   ALT 25 47   ALKPHOS 75 58     Recent Labs   Lab Test 06/04/19  1219 05/31/19  1334 04/07/19  1744   WBC 8.8  --  14.2*   ANEU 5.3  --  10.8*   HGB 14.9 14.6 14.0     --  357            Psychiatric Diagnoses     1. Encounter for therapeutic drug monitoring    2. Polysubstance abuse (H)    3. Schizoaffective disorder, bipolar type (H)               Assessment     28-year-old female with a history of schizoaffective disorder, polysubstance abuse including opioid dependence remitted for 3 years, who was admitted to [place] with acute haven after our initial visit at [facility] on 6/3.  Her mood has stabilized on Prolixin.  She continues to have some mild baseline auditory hallucinations with voices that are just \"chatting\" so, we will plan to change over her Prolixin to injectable form and observe carefully to make sure she can maintain mood stability.  She will need ongoing SEE and other support for legal consequences related to recent mood instability; she got a disorderly conduct charge due to being agitated while manic.  Will need to continue addressing marijuana use and advising abstinence. She also wants to proceed " with wrist cyst excision and this seems appropriate now.         Plan     **Ok to schedule for orthopedic surgery as she is no longer manic and able to care for herself and consent to surgery    Medications  -Continue current medications for now except to stop guanfacine due to dry mouth and assess for improvement on that  -Continue oral fluphenazine, 1 mg twice daily; will discuss plan for conversion to injectable with [facility] team, although need to assess whether we can do this when she is on a small dose    Labs/monitoring  - Outpatient lithium level later this week, instructed on how to get a trough, to make sure level has stabilized  -Outpatient EKG to recheck QTc given past history of prolongation    Therapy:  -Establish with navigate for ongoing supportive therapy  -Has SEE appointment today, encouraged to get support on documentation for school and legal system        TREATMENT RISK STATEMENT:  The risks, benefits, alternatives and potential adverse effects have been discussed and are understood by the pt. The pt understands the risks of using street drugs or alcohol. There are no medical contraindications, the pt agrees to treatment with the ability to do so. The pt knows to call the clinic for any problems or to access emergency care if needed.  Medical and substance use concerns are documented above.  Psychotropic drug interaction check was done, including changes made today.      PROVIDER:  Callie Flores MD

## 2019-06-17 NOTE — TELEPHONE ENCOUNTER
Patient left us a VM stating that she wants to reschedule her surgery with Dr. Richardson, which was cancel due to her needing a psychiatry eval first.     Sent message to Dr. Richardson team, to see if we are able to reschedule surgery.

## 2019-06-17 NOTE — PROGRESS NOTES
NAVIGATE SEE Progress Note   For Supported Employment & Education    NAVIGATE Enrollee: Ayana Prasad (1990)     MRN: [id number]  Date:  6/17/2019    Clinician: MALIKATE Supported Employment & , Linh Hamilton    1. Client Status Update:   Ayana Prasad is interested in education (Client enrolled in class or school (e.g. GED course, certificate program, communicaty college or other educational programs)) and employment (Client started competitive employment position)    2. People present:   SEE/Writer  Client: Ayana Prasad    3. Total number of persons who participated in contact: (do not count yourself/SEE) 1    4. Length of Actual Contact: 35 minutes   Traveled? No    5. Location of contact:  [facility] [place]    6. Brief description of session, contact, or client status (include: strategies, interventions, client reaction to contact, next steps, etc)    Writer and Ayana met to discuss and prepare for her new job and her new school. 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 Ayana also enrolled in full time online school within [employer] to obtain a second bachelor's degree. Ayana had not planned on enrolling in school but stated that she enjoyed learning, gets her education for free and so she enrolled as an undergrad for the second time. I encouraged her to explore other school/training opportunities and to note the drop date if her new job, new school and new home (she's moving houses this week) becomes overwhelming.   We discussed disclosure and documentation required for school as well as her upcoming court date. Asked Ayana to email me with her request and I can attempt to obtain.    7. Completion of mutually agreed upon client task from previous meeting:  Completed    8. Orientation and Treatment Planning:  Pursuing current SEE goals    9. Assessment:  Assessing client's need for follow-along  supports    10. Placement:  Employment  (Assisting client with completing applications or resume)    11. Follow Along Supports: (for clients who are working or attending school)   Employment  (Indicate client's current decision regarding disclosure: Client wants to use disclosure)    12. Mutually agreed upon client task for next meeting:     See above    13. Next Meeting Scheduled for: next week    Linh WALSH Supported Employment &

## 2019-06-18 ENCOUNTER — TRANSFERRED RECORDS (OUTPATIENT)
Dept: HEALTH INFORMATION MANAGEMENT | Facility: CLINIC | Age: 29
End: 2019-06-18

## 2019-06-18 ENCOUNTER — TELEPHONE (OUTPATIENT)
Dept: PSYCHIATRY | Facility: CLINIC | Age: 29
End: 2019-06-18

## 2019-06-18 ASSESSMENT — ANXIETY QUESTIONNAIRES: GAD7 TOTAL SCORE: 8

## 2019-06-21 ENCOUNTER — FCC EXTENDED DOCUMENTATION (OUTPATIENT)
Dept: PSYCHOLOGY | Facility: CLINIC | Age: 29
End: 2019-06-21

## 2019-06-21 NOTE — TELEPHONE ENCOUNTER
Medication discontinued upon discharge.    Bisi Ford  [facility] Liaison     [facility]  [address] [facility]  [address] [place] [address] [address], [place] [address]   [email or web address]  [email or web address]   Phone: [phone]  Pager: [phone]  Fax: [phone]

## 2019-06-21 NOTE — PROGRESS NOTES
Discharge Summary  Single Session    Client Name: Ayana Prasad MRN#: [id number] YOB: 1990      Intake / Discharge Date: 5/01/19 - 6/21/19      DSM5 Diagnoses: (Sustained by DSM5 Criteria Listed Above)  Diagnoses:  295.70  (F25) Schizoaffective Disorder Bipolar Type  Psychosocial & Contextual Factors: Past trauma and neglect, history of substance abuse  WHODAS 2.0 (12 item) Score: 24          Presenting Concern:  Bella and psychosis symptoms leading to hospitalization, substance use and risky behavior      Reason for Discharge:  Referred to Navigate Program at the [facility]      Disposition at Time of Last Encounter:   Comments:   Discharge completed following confirmation that client has been accepted and is engaged in Navigate Program.     Risk Management:   Client has had a history of suicidal ideation and self harm related to hearing voices. Crisis numbers were provided to client at time of intake.      Referred To:  [facility] Program at [facility]        Little Yun MA, [facility]   6/21/2019

## 2019-06-24 ENCOUNTER — DOCUMENTATION ONLY (OUTPATIENT)
Dept: PSYCHIATRY | Facility: CLINIC | Age: 29
End: 2019-06-24

## 2019-06-24 ENCOUNTER — OFFICE VISIT (OUTPATIENT)
Dept: FAMILY MEDICINE | Facility: CLINIC | Age: 29
End: 2019-06-24
Payer: COMMERCIAL

## 2019-06-24 ENCOUNTER — APPOINTMENT (OUTPATIENT)
Dept: ULTRASOUND IMAGING | Facility: CLINIC | Age: 29
End: 2019-06-24
Attending: EMERGENCY MEDICINE
Payer: COMMERCIAL

## 2019-06-24 ENCOUNTER — HOSPITAL ENCOUNTER (EMERGENCY)
Facility: CLINIC | Age: 29
Discharge: HOME | End: 2019-06-24
Attending: EMERGENCY MEDICINE | Admitting: EMERGENCY MEDICINE
Payer: COMMERCIAL

## 2019-06-24 VITALS
WEIGHT: 190 LBS | OXYGEN SATURATION: 95 % | DIASTOLIC BLOOD PRESSURE: 91 MMHG | RESPIRATION RATE: 18 BRPM | TEMPERATURE: 98.2 F | BODY MASS INDEX: 29.76 KG/M2 | SYSTOLIC BLOOD PRESSURE: 130 MMHG

## 2019-06-24 VITALS
TEMPERATURE: 98.4 F | DIASTOLIC BLOOD PRESSURE: 78 MMHG | RESPIRATION RATE: 20 BRPM | SYSTOLIC BLOOD PRESSURE: 120 MMHG | WEIGHT: 206 LBS | HEART RATE: 64 BPM | BODY MASS INDEX: 32.26 KG/M2

## 2019-06-24 DIAGNOSIS — M79.671 RIGHT FOOT PAIN: ICD-10-CM

## 2019-06-24 DIAGNOSIS — Z13.6 SCREENING FOR CARDIOVASCULAR CONDITION: Primary | ICD-10-CM

## 2019-06-24 DIAGNOSIS — M79.661 PAIN OF RIGHT LOWER LEG: ICD-10-CM

## 2019-06-24 DIAGNOSIS — S86.891A RIGHT MEDIAL TIBIAL STRESS SYNDROME, INITIAL ENCOUNTER: ICD-10-CM

## 2019-06-24 DIAGNOSIS — F31.11 BIPOLAR 1 DISORDER, MANIC, MILD (H): ICD-10-CM

## 2019-06-24 LAB — LITHIUM SERPL-SCNC: 0.75 MMOL/L (ref 0.6–1.2)

## 2019-06-24 PROCEDURE — 93000 ELECTROCARDIOGRAM COMPLETE: CPT | Performed by: NURSE PRACTITIONER

## 2019-06-24 PROCEDURE — 99214 OFFICE O/P EST MOD 30 MIN: CPT | Performed by: NURSE PRACTITIONER

## 2019-06-24 PROCEDURE — 36415 COLL VENOUS BLD VENIPUNCTURE: CPT | Performed by: PSYCHIATRY & NEUROLOGY

## 2019-06-24 PROCEDURE — 80178 ASSAY OF LITHIUM: CPT | Performed by: PSYCHIATRY & NEUROLOGY

## 2019-06-24 PROCEDURE — 99284 EMERGENCY DEPT VISIT MOD MDM: CPT | Mod: 25

## 2019-06-24 PROCEDURE — 93971 EXTREMITY STUDY: CPT | Mod: RT

## 2019-06-24 RX ORDER — GABAPENTIN 300 MG/1
600 CAPSULE ORAL 3 TIMES DAILY
Qty: 180 CAPSULE | Refills: 1 | Status: SHIPPED | OUTPATIENT
Start: 2019-06-24 | End: 2019-07-19

## 2019-06-24 ASSESSMENT — ENCOUNTER SYMPTOMS
PALPITATIONS: 0
ARTHRALGIAS: 1
BACK PAIN: 0
ABDOMINAL DISTENTION: 0
WHEEZING: 0
COUGH: 0
CONSTIPATION: 0
VOMITING: 0
MYALGIAS: 0
CHEST TIGHTNESS: 0
ARTHRALGIAS: 1
LIGHT-HEADEDNESS: 0
ABDOMINAL PAIN: 0
NAUSEA: 0
SLEEP DISTURBANCE: 0
HEADACHES: 0
RHINORRHEA: 0
NERVOUS/ANXIOUS: 0
DIARRHEA: 0
SORE THROAT: 0
SHORTNESS OF BREATH: 0
FATIGUE: 0
DIZZINESS: 0
NUMBNESS: 0
DYSPHORIC MOOD: 0

## 2019-06-24 ASSESSMENT — PAIN SCALES - GENERAL: PAINLEVEL: EXTREME PAIN (8)

## 2019-06-24 NOTE — ED PROVIDER NOTES
History     Chief Complaint:  Leg Pain    HPI   Ayana Prasad is a 28 year old female who presents to the emergency department today with leg pain. Patient complains of ongoing right leg pain that starts in her shin bone and radiates down her ankle and toes with throbbing sensation. The foot feels like it is being pinched. She notes some back soreness that is not severe. Patient denies recent rash, leg swelling. She denies falls or trauma. recently admitted to inpatient psych from 6/3 to 6/11 for increased manic behavior. She was seen for this exact same pain on 6/18 with negative x-rays at urgent care.  Patient took Ibuprofen and Tylenol at home.     Allergies:  Haldol [Haloperidol]  Seroquel [Quetiapine]  Adhesive Tape  Percocet [Oxycodone-Acetaminophen]  Prednisone  Risperidone  Droperidol    Medications:    Eszopiclone (Lunesta) 2 Mg Tablet  Fluphenazine (Prolixin) 1 Mg Tablet  Gabapentin (Neurontin) 300 Mg Capsule  Guanfacine (Intuniv) 1 Mg Tb24 24 Hr Tablet  Hydroxyzine (Atarax) 50 Mg Tablet  Lithium Er (Lithobid) 300 Mg Cr Tablet  Lorazepam (Ativan) 1 Mg Tablet  Nicotine (Nicoderm Cq) 21 Mg/24hr 24 Hr Patch  Norelgestromin-ethinyl Estradiol (Ortho Evra) 150-35 Mcg/24hr Patch  Olanzapine (Zyprexa) 10 Mg Tablet  Ondansetron (Zofran) 4 Mg Tablet  Trazodone (Desyrel) 50 Mg Tablet    Past Medical History:    ADHD (attention deficit hyperactivity disorder)   Anxiety   Bipolar 1 disorder   Borderline personality disorder   Cannabis dependence   Cauda equina syndrome with neurogenic bladder  Chronic low back pain   Class 2 obesity due to excess calories in adult   Cyst of left ovary   Depression   Episodic mood disorder  GERD (gastroesophageal reflux disease)   TBI (traumatic brain injury)    Bella    Marginal corneal ulcer, left   Nephrolithiasis    Optic neuritis   Polysubstance abuse - methamphetamine, hallucinogen, opioids/heroin, marijuana   PONV (postoperative nausea and vomiting)   PTSD (post-traumatic  stress disorder)   Schizoaffective disorder, bipolar type        Past Surgical History:    Back Surgery - For Cauda Equina Syndrome   Combined Cystoscopy  Cystoscopy, Ureteroscopy, Combined   Ent Surgery   Esophagoscopy, Gastroscopy, Duodenoscopy (Egd), Combined   Laparoscopic Cholecystectomy   Laser Holmium Lithotripsy Ureter, Insert Stent, Combined   Transurethral Stone Resection     Family History:    Anesthesia Reaction  Anxiety Disorder  Cancer  Diabetes  Gastrointestinal Disease  Heart Disease  Hyperlipidemia  Hypertension  Mental Illness  Prostate Cancer    Social History:  The patient was alone.  Smoking Status: Former  Smokeless Tobacco: Current user  Alcohol Use: No    Marital Status:      Review of Systems   Cardiovascular: Negative for leg swelling.   Musculoskeletal: Positive for arthralgias (right calf, ankle, and foot pain). Negative for back pain.   Skin: Negative for rash.   All other systems reviewed and are negative.      Physical Exam     Patient Vitals for the past 24 hrs:   BP Temp Temp src Heart Rate Resp SpO2 Weight   06/24/19 0536 (!) 130/91 98.2  F (36.8  C) Oral 89 18 95 % 86.2 kg (190 lb)      Physical Exam  Nursing note and vitals reviewed.  Constitutional: Cooperative.   Cardiovascular: Normal rate, regular rhythm. 2+ DP and PT pulses bilaterally  Pulmonary/Chest: Effort normal.   Musculoskeletal:  RLE:  Full range of motion of the hip, knee, ankle and toes. No pitting edema. No tenderness to palpation of the bony prominences. Compartments of the lower leg are soft. No warmth or erythema to the joints. No skin changes concerning for infection.   Neurological: Alert.   Skin: Skin is warm and dry. No rash noted.   Psychiatric: Normal mood and affect.       Emergency Department Course   Imaging:  Radiology findings were communicated with the patient who voiced understanding of the findings.  US Lower Extremity Venous Duplex Right   Preliminary Result   IMPRESSION: No evidence of  thrombus in the major veins of the right   lower extremity.      Report per radiology       Emergency Department Course:  Nursing notes and vitals reviewed.  0547: I performed an exam of the patient as documented above.   The patient was sent for a US Lower Extremity Venous Duplex Right while in the emergency department, results above.   Patient rechecked and updated.    Findings and plan explained to the Patient. Patient discharged home with instructions regarding supportive care, medications, and reasons to return. The importance of close follow-up was reviewed.   I personally reviewed the imaging results with the Patient and answered all related questions prior to discharge.      Impression & Plan    Medical Decision Making:  Ayana Prasad is a 28 year old female with complex psychiatric history as detailed above who presents with atraumatic right leg pain. She describes as being on her shin and extending into the foot. She has no radicular symptoms from her lumbar back region. She has no joint swelling to be concerned for septic arthritis or inflammatory arthritis such as gout or pseudogout. No indication to represent deep space tissue infection or necrotizing fasciitis Ultrasound was negative for DVT. No indications for XR or imaging as she is not a trauma, therefore bony abnormality would be highly unlikely. I will have her proceed with Ibuprofen, Tylenol, rest, and follow up with regular physician as needed.      Diagnosis:    ICD-10-CM    1. Pain of right lower leg M79.661        Disposition:  discharged to home    Scribe Disclosure:  IGrace, am serving as a scribe at 5:48 AM on 6/24/2019 to document services personally performed by Stiven Ernst MD based on my observations and the provider's statements to me.    6/24/2019   [facility] EMERGENCY DEPARTMENT       Stiven Ernst MD  06/24/19 0641

## 2019-06-24 NOTE — ED NOTES
Bed: ED09  Expected date: 6/24/19  Expected time: 5:22 AM  Means of arrival:   Comments:  HE: 28 F Leg Pain

## 2019-06-24 NOTE — PROGRESS NOTES
Subjective     Ayana Prasad is a 28 year old female who presents to clinic today for the following health issues:    HPI     ED/UC Followup:    Facility:  [facility] Emergency Department  Date of visit: 6/24/19  Reason for visit: Pain of right lower leg  Current Status: same     Pt refuses EKG.    Current Outpatient Medications   Medication Sig Dispense Refill     Amino Acids (AMINO ACID PO)        eszopiclone (LUNESTA) 2 MG tablet Take 1 tablet (2 mg) by mouth nightly as needed for sleep 30 tablet 0     fluPHENAZine (PROLIXIN) 1 MG tablet Take 1 tablet (1 mg) by mouth 2 times daily 60 tablet 1     gabapentin (NEURONTIN) 300 MG capsule Take 2 capsules (600 mg) by mouth 3 times daily 180 capsule 1     lithium ER (LITHOBID) 300 MG CR tablet Take 2 tablets (600 mg) by mouth At Bedtime 60 tablet 1     nicotine (NICODERM CQ) 21 MG/24HR 24 hr patch Place 1 patch onto the skin daily 30 patch 1     nicotine polacrilex (NICORETTE) 4 MG gum Place 1 each (4 mg) inside cheek every hour as needed for other (nicotine withdrawal symptoms) 100 tablet 1     ondansetron (ZOFRAN) 4 MG tablet Take 1 tablet (4 mg) by mouth every 8 hours as needed for nausea 30 tablet 1     PREBIOTIC PRODUCT PO Take 1 capsule by mouth daily       traZODone (DESYREL) 50 MG tablet Take 1 tablet (50 mg) by mouth nightly as needed for sleep 30 tablet 1     guanFACINE (INTUNIV) 1 MG TB24 24 hr tablet Take 1 tablet (1 mg) by mouth daily (Patient not taking: Reported on 6/24/2019) 30 tablet 1     hydrOXYzine (ATARAX) 50 MG tablet Take 1 tablet (50 mg) by mouth every 4 hours as needed for anxiety (Patient not taking: Reported on 6/24/2019) 30 tablet 1     LORazepam (ATIVAN) 1 MG tablet Take 1 tablet (1 mg) by mouth 2 times daily as needed for anxiety (Patient not taking: Reported on 6/24/2019) 60 tablet 0     norelgestromin-ethinyl estradiol (ORTHO EVRA) 150-35 MCG/24HR patch Remove old patch and apply new patch onto the skin once a week for 3  weeks (21 days). Ok to wear consistently (Patient not taking: Reported on 6/14/2019) 12 patch 3     OLANZapine (ZYPREXA) 10 MG tablet Take 1 tablet (10 mg) by mouth 2 times daily as needed (Agitation) (Patient not taking: Reported on 6/24/2019) 60 tablet 1     Allergies   Allergen Reactions     Haldol [Haloperidol] Other (See Comments)     Makes patient very angry and anxious     Seroquel [Quetiapine] Palpitations     Spent 2 weeks in the hospital due to having seroquel, caused palpitations and QT prolongation     Adhesive Tape Hives     Percocet [Oxycodone-Acetaminophen] Nausea and Vomiting     Prednisone Other (See Comments) and Hives     Suicidal ideation     Risperidone Other (See Comments)     Droperidol Anxiety       Reviewed and updated as needed this visit by Provider             Right leg pain for the last 1 week. Just right leg. Hurts down shin, into ankle and foot and toes. Using ice and heat and taking tylenol and ibuprofen.  Does not feel like Tylenol and ibuprofen are working.  Using KT tape. Tried an ankle brace. Movement does not make it worse. Is not walking as much as had been. Is only walking 1-2 miles. Went to ER and had US that was negative for DVT. Also was seen at Urgent Care and had x-ray that was normal.     Pharmacy medication review team recommend EKG due to current medications that is on.  Is to see counselor again on Friday.  Is unsure when sees psychiatrist again.  Reports, has been taking all medications that have been prescribed.    Did not reschedule hand surgery. Is irritated that it was cancelled when was on discharge summary that is ok to have     Objective    /78 (BP Location: Right arm, Patient Position: Sitting, Cuff Size: Adult Regular)   Pulse 64   Temp 98.4  F (36.9  C) (Tympanic)   Resp 20   Wt 93.4 kg (206 lb)   LMP  (LMP Unknown)   BMI 32.26 kg/m    Body mass index is 32.26 kg/m .          Assessment & Plan     Review of Systems   Constitutional: Negative for  fatigue.   HENT: Negative for ear pain, rhinorrhea and sore throat.    Eyes: Negative for visual disturbance.   Respiratory: Negative for cough, chest tightness, shortness of breath and wheezing.    Cardiovascular: Negative for chest pain, palpitations and leg swelling.   Gastrointestinal: Negative for abdominal distention, abdominal pain, constipation, diarrhea, nausea and vomiting.   Endocrine: Negative for cold intolerance and heat intolerance.   Musculoskeletal: Positive for arthralgias (right leg pian ). Negative for myalgias.   Skin: Negative for rash.   Neurological: Negative for dizziness, light-headedness, numbness and headaches.   Psychiatric/Behavioral: Negative for dysphoric mood and sleep disturbance. The patient is not nervous/anxious.        Physical Exam   Constitutional: She appears well-developed and well-nourished.   HENT:   Head: Normocephalic and atraumatic.   Right Ear: Tympanic membrane and external ear normal. No middle ear effusion.   Left Ear: Tympanic membrane and external ear normal.  No middle ear effusion.   Nose: No mucosal edema.   Mouth/Throat: Oropharynx is clear and moist and mucous membranes are normal.   Neck: Carotid bruit is not present. No thyromegaly present.   Cardiovascular: Normal rate, regular rhythm and normal heart sounds.   Pulmonary/Chest: Effort normal and breath sounds normal.   Abdominal: Soft. Normal appearance and bowel sounds are normal.   Musculoskeletal: She exhibits no edema.   Neurological: She is alert.   Skin: Skin is warm and dry.   Psychiatric: She has a normal mood and affect. Her behavior is normal.       1. Screening for cardiovascular condition  - EKG 12-lead complete w/read - Clinics    2. Right foot pain  Order placed, plans to call per self and set up  - ORTHOTICS REFERRAL    3. Bipolar 1 disorder, manic, mild  Continue to take ordered medications.  Encouraged to continue to follow with mental health.  - gabapentin (NEURONTIN) 300 MG capsule; Take  2 capsules (600 mg) by mouth 3 times daily  Dispense: 180 capsule; Refill: 1    5. Right medial tibial stress syndrome, initial encounter  Encouraged to rest area.  Apply ice.  Educated regarding stretches.  May continue with KT tape if would like.    BROOKLYN Cruz [facility]

## 2019-06-24 NOTE — ED TRIAGE NOTES
Pt arrives via EMS with ongoing right leg pain that radiates to foot and woke her out of sleep at 0300.  Denies injury and able to walk on it.  Pt states she has an appt this morning at clinic but could not wait.  Took 1000mg of tylenol dn 400mg of ibuprofen this morning

## 2019-06-24 NOTE — ED AVS SNAPSHOT
[facility] Emergency Department  [address]  [facility] [address]  Phone:  [phone]  Fax:  [phone]                                    Ayana Prasad   MRN: [id number]    Department:  [facility] Emergency Department   Date of Visit:  6/24/2019           After Visit Summary Signature Page    I have received my discharge instructions, and my questions have been answered. I have discussed any challenges I see with this plan with the nurse or doctor.    ..........................................................................................................................................  Patient/Patient Representative Signature      ..........................................................................................................................................  Patient Representative Print Name and Relationship to Patient    ..................................................               ................................................  Date                                   Time    ..........................................................................................................................................  Reviewed by Signature/Title    ...................................................              ..............................................  Date                                               Time          22EPIC Rev 08/18

## 2019-06-25 DIAGNOSIS — F25.0 SCHIZOAFFECTIVE DISORDER, BIPOLAR TYPE (H): Primary | ICD-10-CM

## 2019-06-25 RX ORDER — FLUPHENAZINE DECANOATE 25 MG/ML
2.5 INJECTION, SOLUTION INTRAMUSCULAR; SUBCUTANEOUS
Qty: 5 ML | Refills: 0 | OUTPATIENT
Start: 2019-06-25 | End: 2019-07-09

## 2019-06-25 RX ORDER — FLUPHENAZINE DECANOATE 25 MG/ML
2.5 INJECTION, SOLUTION INTRAMUSCULAR; SUBCUTANEOUS
Status: DISCONTINUED | OUTPATIENT
Start: 2019-06-25 | End: 2019-10-09

## 2019-06-25 NOTE — TELEPHONE ENCOUNTER
Dr Richardson will review clinic notes this week.  Patient will be contacted concerning a future surgery date after the review.  Message was left on patient's voicemail.

## 2019-06-26 ENCOUNTER — HOSPITAL ENCOUNTER (INPATIENT)
Facility: CLINIC | Age: 29
LOS: 2 days | Discharge: HOME | End: 2019-06-29
Attending: EMERGENCY MEDICINE | Admitting: PSYCHIATRY & NEUROLOGY
Payer: COMMERCIAL

## 2019-06-26 ENCOUNTER — APPOINTMENT (OUTPATIENT)
Dept: MRI IMAGING | Facility: CLINIC | Age: 29
End: 2019-06-26
Attending: EMERGENCY MEDICINE
Payer: COMMERCIAL

## 2019-06-26 ENCOUNTER — NURSE TRIAGE (OUTPATIENT)
Dept: NURSING | Facility: CLINIC | Age: 29
End: 2019-06-26

## 2019-06-26 DIAGNOSIS — R33.9 URINARY RETENTION: Primary | ICD-10-CM

## 2019-06-26 DIAGNOSIS — G83.4 CAUDA EQUINA SYNDROME WITH NEUROGENIC BLADDER (H): ICD-10-CM

## 2019-06-26 DIAGNOSIS — R55 SPELL OF LOSS OF CONSCIOUSNESS: ICD-10-CM

## 2019-06-26 LAB
ALBUMIN SERPL-MCNC: 3.9 G/DL (ref 3.4–5)
ALBUMIN UR-MCNC: NEGATIVE MG/DL
ALP SERPL-CCNC: 54 U/L (ref 40–150)
ALT SERPL W P-5'-P-CCNC: 40 U/L (ref 0–50)
AMPHETAMINES UR QL SCN: NEGATIVE
ANION GAP SERPL CALCULATED.3IONS-SCNC: 6 MMOL/L (ref 3–14)
APPEARANCE UR: CLEAR
AST SERPL W P-5'-P-CCNC: NORMAL U/L (ref 0–45)
BARBITURATES UR QL: NEGATIVE
BASOPHILS # BLD AUTO: 0 10E9/L (ref 0–0.2)
BASOPHILS NFR BLD AUTO: 0.3 %
BENZODIAZ UR QL: NEGATIVE
BILIRUB SERPL-MCNC: 0.4 MG/DL (ref 0.2–1.3)
BILIRUB UR QL STRIP: NEGATIVE
BUN SERPL-MCNC: 19 MG/DL (ref 7–30)
CALCIUM SERPL-MCNC: 9.8 MG/DL (ref 8.5–10.1)
CANNABINOIDS UR QL SCN: POSITIVE
CHLORIDE SERPL-SCNC: 108 MMOL/L (ref 94–109)
CO2 SERPL-SCNC: 25 MMOL/L (ref 20–32)
COCAINE UR QL: NEGATIVE
COLOR UR AUTO: ABNORMAL
CREAT SERPL-MCNC: 0.87 MG/DL (ref 0.52–1.04)
DIFFERENTIAL METHOD BLD: ABNORMAL
EOSINOPHIL # BLD AUTO: 0.2 10E9/L (ref 0–0.7)
EOSINOPHIL NFR BLD AUTO: 1.6 %
ERYTHROCYTE [DISTWIDTH] IN BLOOD BY AUTOMATED COUNT: 14.1 % (ref 10–15)
ETHANOL SERPL-MCNC: <0.01 G/DL
ETHANOL UR QL SCN: NEGATIVE
GFR SERPL CREATININE-BSD FRML MDRD: >90 ML/MIN/{1.73_M2}
GLUCOSE BLDC GLUCOMTR-MCNC: 85 MG/DL (ref 70–99)
GLUCOSE SERPL-MCNC: 77 MG/DL (ref 70–99)
GLUCOSE UR STRIP-MCNC: NEGATIVE MG/DL
HCG SERPL QL: NEGATIVE
HCT VFR BLD AUTO: 38.2 % (ref 35–47)
HGB BLD-MCNC: 12.6 G/DL (ref 11.7–15.7)
HGB UR QL STRIP: NEGATIVE
IMM GRANULOCYTES # BLD: 0 10E9/L (ref 0–0.4)
IMM GRANULOCYTES NFR BLD: 0.3 %
KETONES UR STRIP-MCNC: NEGATIVE MG/DL
LEUKOCYTE ESTERASE UR QL STRIP: NEGATIVE
LITHIUM SERPL-SCNC: 0.65 MMOL/L (ref 0.6–1.2)
LYMPHOCYTES # BLD AUTO: 2.9 10E9/L (ref 0.8–5.3)
LYMPHOCYTES NFR BLD AUTO: 23.6 %
MCH RBC QN AUTO: 28.6 PG (ref 26.5–33)
MCHC RBC AUTO-ENTMCNC: 33 G/DL (ref 31.5–36.5)
MCV RBC AUTO: 87 FL (ref 78–100)
MONOCYTES # BLD AUTO: 0.8 10E9/L (ref 0–1.3)
MONOCYTES NFR BLD AUTO: 6.4 %
NEUTROPHILS # BLD AUTO: 8.3 10E9/L (ref 1.6–8.3)
NEUTROPHILS NFR BLD AUTO: 67.8 %
NITRATE UR QL: NEGATIVE
NRBC # BLD AUTO: 0 10*3/UL
NRBC BLD AUTO-RTO: 0 /100
OPIATES UR QL SCN: NEGATIVE
PH UR STRIP: 7.5 PH (ref 5–7)
PLATELET # BLD AUTO: 379 10E9/L (ref 150–450)
POTASSIUM SERPL-SCNC: 4.2 MMOL/L (ref 3.4–5.3)
PROT SERPL-MCNC: 8.3 G/DL (ref 6.8–8.8)
RBC # BLD AUTO: 4.41 10E12/L (ref 3.8–5.2)
SODIUM SERPL-SCNC: 139 MMOL/L (ref 133–144)
SOURCE: ABNORMAL
SP GR UR STRIP: 1.01 (ref 1–1.03)
TROPONIN I SERPL-MCNC: <0.015 UG/L (ref 0–0.04)
UROBILINOGEN UR STRIP-MCNC: NORMAL MG/DL (ref 0–2)
WBC # BLD AUTO: 12.2 10E9/L (ref 4–11)

## 2019-06-26 PROCEDURE — 80320 DRUG SCREEN QUANTALCOHOLS: CPT | Performed by: EMERGENCY MEDICINE

## 2019-06-26 PROCEDURE — 96374 THER/PROPH/DIAG INJ IV PUSH: CPT | Mod: 59 | Performed by: EMERGENCY MEDICINE

## 2019-06-26 PROCEDURE — A9585 GADOBUTROL INJECTION: HCPCS | Performed by: EMERGENCY MEDICINE

## 2019-06-26 PROCEDURE — 00000146 ZZHCL STATISTIC GLUCOSE BY METER IP

## 2019-06-26 PROCEDURE — 84703 CHORIONIC GONADOTROPIN ASSAY: CPT | Performed by: EMERGENCY MEDICINE

## 2019-06-26 PROCEDURE — 80178 ASSAY OF LITHIUM: CPT | Performed by: EMERGENCY MEDICINE

## 2019-06-26 PROCEDURE — 25500064 ZZH RX 255 OP 636: Performed by: EMERGENCY MEDICINE

## 2019-06-26 PROCEDURE — 81003 URINALYSIS AUTO W/O SCOPE: CPT | Performed by: EMERGENCY MEDICINE

## 2019-06-26 PROCEDURE — 80053 COMPREHEN METABOLIC PANEL: CPT | Performed by: EMERGENCY MEDICINE

## 2019-06-26 PROCEDURE — 80307 DRUG TEST PRSMV CHEM ANLYZR: CPT | Performed by: EMERGENCY MEDICINE

## 2019-06-26 PROCEDURE — 70553 MRI BRAIN STEM W/O & W/DYE: CPT

## 2019-06-26 PROCEDURE — 25800030 ZZH RX IP 258 OP 636: Performed by: EMERGENCY MEDICINE

## 2019-06-26 PROCEDURE — 93005 ELECTROCARDIOGRAM TRACING: CPT | Performed by: EMERGENCY MEDICINE

## 2019-06-26 PROCEDURE — 85025 COMPLETE CBC W/AUTO DIFF WBC: CPT | Performed by: EMERGENCY MEDICINE

## 2019-06-26 PROCEDURE — 99285 EMERGENCY DEPT VISIT HI MDM: CPT | Mod: 25 | Performed by: EMERGENCY MEDICINE

## 2019-06-26 PROCEDURE — 96375 TX/PRO/DX INJ NEW DRUG ADDON: CPT | Performed by: EMERGENCY MEDICINE

## 2019-06-26 PROCEDURE — 25000128 H RX IP 250 OP 636: Performed by: EMERGENCY MEDICINE

## 2019-06-26 PROCEDURE — 99285 EMERGENCY DEPT VISIT HI MDM: CPT | Mod: Z6 | Performed by: EMERGENCY MEDICINE

## 2019-06-26 PROCEDURE — 84484 ASSAY OF TROPONIN QUANT: CPT | Performed by: EMERGENCY MEDICINE

## 2019-06-26 RX ORDER — LORAZEPAM 2 MG/ML
1 INJECTION INTRAMUSCULAR
Status: COMPLETED | OUTPATIENT
Start: 2019-06-26 | End: 2019-06-26

## 2019-06-26 RX ORDER — ONDANSETRON 2 MG/ML
4 INJECTION INTRAMUSCULAR; INTRAVENOUS ONCE
Status: COMPLETED | OUTPATIENT
Start: 2019-06-26 | End: 2019-06-26

## 2019-06-26 RX ORDER — GADOBUTROL 604.72 MG/ML
10 INJECTION INTRAVENOUS ONCE
Status: COMPLETED | OUTPATIENT
Start: 2019-06-26 | End: 2019-06-26

## 2019-06-26 RX ORDER — SODIUM CHLORIDE 9 MG/ML
INJECTION, SOLUTION INTRAVENOUS CONTINUOUS
Status: DISCONTINUED | OUTPATIENT
Start: 2019-06-26 | End: 2019-06-28

## 2019-06-26 RX ADMIN — SODIUM CHLORIDE: 9 INJECTION, SOLUTION INTRAVENOUS at 20:40

## 2019-06-26 RX ADMIN — LORAZEPAM 1 MG: 2 INJECTION INTRAMUSCULAR; INTRAVENOUS at 20:45

## 2019-06-26 RX ADMIN — GADOBUTROL 9 ML: 604.72 INJECTION INTRAVENOUS at 20:56

## 2019-06-26 RX ADMIN — ONDANSETRON 4 MG: 2 INJECTION INTRAMUSCULAR; INTRAVENOUS at 20:49

## 2019-06-26 ASSESSMENT — ENCOUNTER SYMPTOMS
FEVER: 0
NAUSEA: 1
HEADACHES: 0
SEIZURES: 1
ABDOMINAL PAIN: 0
WEAKNESS: 1
DIZZINESS: 1
BACK PAIN: 0

## 2019-06-26 NOTE — TELEPHONE ENCOUNTER
"Patient's wife Gina reports \"I think Ayana is having seizures. This morning she freaked out and was screaming. At work had another screaming thing and her boss wanted to call ambulance. And at 6:15 pm had another screaming thing. She doesn't remember what happens.\"    Patient reports she is scared and slightly shaking during the screaming episodes. Right now has some neck cramping. Reports 4 screaming/shaking episodes today. She was asleep for one screaming/shaking event and awake for the others.     Triage guidelines recommend to be seen within 4 hours. Patient's wife will bring patient to the ED right now. Protocol and care advice reviewed.  Caller states understanding of the recommended disposition.  Advised to call back if further questions or concerns.    Galina Cook RN/[facility]    Reason for Disposition    [1] New-onset muscle jerks AND [2] unexplained AND [3] 3 or more times/day    Additional Information    Negative: Difficult to awaken or acting confused (e.g., disoriented, slurred speech)    Negative: Sounds like a life-threatening emergency to the triager    Negative: [1] Muscle rigidity or tightness AND [2] present now    Negative: [1] New-onset muscle jerks (twitches, spasms) AND [2] present now    Negative: [1] New-onset muscle jerks AND [2] episode lasts > 1 minute AND [3] resolved    Negative: Patient sounds very sick or weak to the triager    Negative: [1] Muscle rigidity or tightness AND [2] brief (now gone)    Protocols used: MUSCLE JERKS - TICS - [id number]      "

## 2019-06-26 NOTE — LETTER
[address] [facility]  [address] [facility] [address]  [phone]    2019    Re: Ayana Prasad   [place] [address]  [phone] (home)     : 1990      To Whom It May Concern:      Ayana Prasad was hospitalized from 2019 until 2019 due to medical illness. She should avoid avoid climbing ladders. It is ok for her to carry less than 10 lbs at work as she feels comfortable.        Sincerely,        Monse Barfield MD
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
## 2019-06-27 ENCOUNTER — ALLIED HEALTH/NURSE VISIT (OUTPATIENT)
Dept: NEUROLOGY | Facility: CLINIC | Age: 29
End: 2019-06-27
Attending: PSYCHIATRY & NEUROLOGY
Payer: COMMERCIAL

## 2019-06-27 DIAGNOSIS — R56.9 OBSERVED SEIZURE-LIKE ACTIVITY (H): Primary | ICD-10-CM

## 2019-06-27 PROBLEM — R40.4 SPELL OF ALTERED CONSCIOUSNESS: Status: ACTIVE | Noted: 2019-06-27

## 2019-06-27 LAB
ANION GAP SERPL CALCULATED.3IONS-SCNC: 6 MMOL/L (ref 3–14)
BUN SERPL-MCNC: 14 MG/DL (ref 7–30)
CALCIUM SERPL-MCNC: 9 MG/DL (ref 8.5–10.1)
CHLORIDE SERPL-SCNC: 109 MMOL/L (ref 94–109)
CO2 SERPL-SCNC: 23 MMOL/L (ref 20–32)
CREAT SERPL-MCNC: 0.8 MG/DL (ref 0.52–1.04)
ERYTHROCYTE [DISTWIDTH] IN BLOOD BY AUTOMATED COUNT: 14.2 % (ref 10–15)
GFR SERPL CREATININE-BSD FRML MDRD: >90 ML/MIN/{1.73_M2}
GLUCOSE SERPL-MCNC: 92 MG/DL (ref 70–99)
HCT VFR BLD AUTO: 40.9 % (ref 35–47)
HGB BLD-MCNC: 12.6 G/DL (ref 11.7–15.7)
LACTATE BLD-SCNC: 0.7 MMOL/L (ref 0.7–2)
MCH RBC QN AUTO: 28.1 PG (ref 26.5–33)
MCHC RBC AUTO-ENTMCNC: 30.8 G/DL (ref 31.5–36.5)
MCV RBC AUTO: 91 FL (ref 78–100)
PLATELET # BLD AUTO: 365 10E9/L (ref 150–450)
POTASSIUM SERPL-SCNC: 3.5 MMOL/L (ref 3.4–5.3)
RBC # BLD AUTO: 4.49 10E12/L (ref 3.8–5.2)
SODIUM SERPL-SCNC: 138 MMOL/L (ref 133–144)
WBC # BLD AUTO: 11.2 10E9/L (ref 4–11)

## 2019-06-27 PROCEDURE — 80048 BASIC METABOLIC PNL TOTAL CA: CPT | Performed by: PSYCHIATRY & NEUROLOGY

## 2019-06-27 PROCEDURE — 83605 ASSAY OF LACTIC ACID: CPT

## 2019-06-27 PROCEDURE — 25000132 ZZH RX MED GY IP 250 OP 250 PS 637: Performed by: COUNSELOR

## 2019-06-27 PROCEDURE — 85027 COMPLETE CBC AUTOMATED: CPT | Performed by: PSYCHIATRY & NEUROLOGY

## 2019-06-27 PROCEDURE — 25800030 ZZH RX IP 258 OP 636: Performed by: EMERGENCY MEDICINE

## 2019-06-27 PROCEDURE — 25000132 ZZH RX MED GY IP 250 OP 250 PS 637: Performed by: STUDENT IN AN ORGANIZED HEALTH CARE EDUCATION/TRAINING PROGRAM

## 2019-06-27 PROCEDURE — 12000001 ZZH R&B MED SURG/OB UMMC

## 2019-06-27 PROCEDURE — 25000132 ZZH RX MED GY IP 250 OP 250 PS 637: Performed by: EMERGENCY MEDICINE

## 2019-06-27 PROCEDURE — 25000128 H RX IP 250 OP 636: Performed by: STUDENT IN AN ORGANIZED HEALTH CARE EDUCATION/TRAINING PROGRAM

## 2019-06-27 PROCEDURE — 36415 COLL VENOUS BLD VENIPUNCTURE: CPT | Performed by: PSYCHIATRY & NEUROLOGY

## 2019-06-27 PROCEDURE — 95951 ZZHC EEG VIDEO EACH 24 HR: CPT | Mod: ZF

## 2019-06-27 RX ORDER — LITHIUM CARBONATE 300 MG/1
300 TABLET, FILM COATED, EXTENDED RELEASE ORAL EVERY MORNING
COMMUNITY
End: 2019-07-19

## 2019-06-27 RX ORDER — ONDANSETRON 2 MG/ML
4 INJECTION INTRAMUSCULAR; INTRAVENOUS EVERY 6 HOURS PRN
Status: DISCONTINUED | OUTPATIENT
Start: 2019-06-27 | End: 2019-06-29 | Stop reason: HOSPADM

## 2019-06-27 RX ORDER — ACETAMINOPHEN 325 MG/1
650 TABLET ORAL EVERY 4 HOURS PRN
Status: DISCONTINUED | OUTPATIENT
Start: 2019-06-27 | End: 2019-06-29 | Stop reason: HOSPADM

## 2019-06-27 RX ORDER — TRAZODONE HYDROCHLORIDE 50 MG/1
50 TABLET, FILM COATED ORAL
Status: DISCONTINUED | OUTPATIENT
Start: 2019-06-27 | End: 2019-06-29 | Stop reason: HOSPADM

## 2019-06-27 RX ORDER — OLANZAPINE 10 MG/1
10 TABLET ORAL 2 TIMES DAILY PRN
Status: DISCONTINUED | OUTPATIENT
Start: 2019-06-27 | End: 2019-06-29 | Stop reason: HOSPADM

## 2019-06-27 RX ORDER — ONDANSETRON 4 MG/1
4 TABLET, ORALLY DISINTEGRATING ORAL EVERY 6 HOURS PRN
Status: DISCONTINUED | OUTPATIENT
Start: 2019-06-27 | End: 2019-06-29 | Stop reason: HOSPADM

## 2019-06-27 RX ORDER — GABAPENTIN 300 MG/1
600 CAPSULE ORAL 3 TIMES DAILY
Status: DISCONTINUED | OUTPATIENT
Start: 2019-06-27 | End: 2019-06-29 | Stop reason: HOSPADM

## 2019-06-27 RX ORDER — LITHIUM CARBONATE 300 MG/1
300 CAPSULE ORAL EVERY MORNING
Status: ON HOLD | COMMUNITY
End: 2019-06-27

## 2019-06-27 RX ORDER — NALOXONE HYDROCHLORIDE 0.4 MG/ML
.1-.4 INJECTION, SOLUTION INTRAMUSCULAR; INTRAVENOUS; SUBCUTANEOUS
Status: DISCONTINUED | OUTPATIENT
Start: 2019-06-27 | End: 2019-06-29 | Stop reason: HOSPADM

## 2019-06-27 RX ORDER — ESZOPICLONE 1 MG/1
3 TABLET, FILM COATED ORAL AT BEDTIME
Status: DISCONTINUED | OUTPATIENT
Start: 2019-06-27 | End: 2019-06-29 | Stop reason: HOSPADM

## 2019-06-27 RX ORDER — HYDROXYZINE HYDROCHLORIDE 25 MG/1
50 TABLET, FILM COATED ORAL EVERY 4 HOURS PRN
Status: DISCONTINUED | OUTPATIENT
Start: 2019-06-27 | End: 2019-06-28

## 2019-06-27 RX ORDER — LITHIUM CARBONATE 300 MG/1
300 TABLET, FILM COATED, EXTENDED RELEASE ORAL EVERY MORNING
Status: DISCONTINUED | OUTPATIENT
Start: 2019-06-27 | End: 2019-06-29 | Stop reason: HOSPADM

## 2019-06-27 RX ORDER — FLUPHENAZINE HYDROCHLORIDE 1 MG/1
1 TABLET ORAL 2 TIMES DAILY
Status: DISCONTINUED | OUTPATIENT
Start: 2019-06-27 | End: 2019-06-28

## 2019-06-27 RX ORDER — LIDOCAINE 40 MG/G
CREAM TOPICAL
Status: DISCONTINUED | OUTPATIENT
Start: 2019-06-27 | End: 2019-06-29 | Stop reason: HOSPADM

## 2019-06-27 RX ORDER — LITHIUM CARBONATE 300 MG/1
600 TABLET, FILM COATED, EXTENDED RELEASE ORAL AT BEDTIME
Status: DISCONTINUED | OUTPATIENT
Start: 2019-06-27 | End: 2019-06-29 | Stop reason: HOSPADM

## 2019-06-27 RX ORDER — NICOTINE 21 MG/24HR
1 PATCH, TRANSDERMAL 24 HOURS TRANSDERMAL DAILY
Status: DISCONTINUED | OUTPATIENT
Start: 2019-06-27 | End: 2019-06-29 | Stop reason: HOSPADM

## 2019-06-27 RX ADMIN — HYDROXYZINE HYDROCHLORIDE 50 MG: 25 TABLET ORAL at 11:06

## 2019-06-27 RX ADMIN — ENOXAPARIN SODIUM 40 MG: 40 INJECTION SUBCUTANEOUS at 08:14

## 2019-06-27 RX ADMIN — ESZOPICLONE 3 MG: 1 TABLET, FILM COATED ORAL at 22:50

## 2019-06-27 RX ADMIN — SODIUM CHLORIDE: 9 INJECTION, SOLUTION INTRAVENOUS at 13:36

## 2019-06-27 RX ADMIN — ACETAMINOPHEN 650 MG: 325 TABLET, FILM COATED ORAL at 11:06

## 2019-06-27 RX ADMIN — FLUPHENAZINE HYDROCHLORIDE 1 MG: 1 TABLET, FILM COATED ORAL at 18:27

## 2019-06-27 RX ADMIN — LITHIUM CARBONATE 300 MG: 300 TABLET, EXTENDED RELEASE ORAL at 18:27

## 2019-06-27 RX ADMIN — FLUPHENAZINE HYDROCHLORIDE 1 MG: 1 TABLET, FILM COATED ORAL at 20:48

## 2019-06-27 RX ADMIN — GABAPENTIN 600 MG: 300 CAPSULE ORAL at 13:36

## 2019-06-27 RX ADMIN — ESZOPICLONE 3 MG: 1 TABLET, FILM COATED ORAL at 02:53

## 2019-06-27 RX ADMIN — GABAPENTIN 600 MG: 300 CAPSULE ORAL at 20:48

## 2019-06-27 RX ADMIN — LITHIUM CARBONATE 600 MG: 300 TABLET, EXTENDED RELEASE ORAL at 20:48

## 2019-06-27 RX ADMIN — ACETAMINOPHEN 650 MG: 325 TABLET, FILM COATED ORAL at 23:52

## 2019-06-27 RX ADMIN — TRAZODONE HYDROCHLORIDE 50 MG: 50 TABLET ORAL at 02:53

## 2019-06-27 RX ADMIN — GABAPENTIN 600 MG: 300 CAPSULE ORAL at 08:14

## 2019-06-27 ASSESSMENT — ACTIVITIES OF DAILY LIVING (ADL)
ADLS_ACUITY_SCORE: 15
ADLS_ACUITY_SCORE: 15
TRANSFERRING: 0-->INDEPENDENT
RETIRED_EATING: 0-->INDEPENDENT
ADLS_ACUITY_SCORE: 15
SWALLOWING: 0-->SWALLOWS FOODS/LIQUIDS WITHOUT DIFFICULTY
NUMBER_OF_TIMES_PATIENT_HAS_FALLEN_WITHIN_LAST_SIX_MONTHS: 2
COGNITION: 0 - NO COGNITION ISSUES REPORTED
BATHING: 0-->INDEPENDENT
TOILETING: 0-->INDEPENDENT
ADLS_ACUITY_SCORE: 16
DRESS: 0-->INDEPENDENT
RETIRED_COMMUNICATION: 0-->UNDERSTANDS/COMMUNICATES WITHOUT DIFFICULTY
AMBULATION: 0-->INDEPENDENT
WHICH_OF_THE_ABOVE_FUNCTIONAL_RISKS_HAD_A_RECENT_ONSET_OR_CHANGE?: FALL HISTORY
FALL_HISTORY_WITHIN_LAST_SIX_MONTHS: YES

## 2019-06-27 NOTE — ED PROVIDER NOTES
History     Chief Complaint   Patient presents with     Seizures     Wife states that Ayana has been having several seizures at home.  Witnessed 6 seizures. Patient does not remember.     HPI  Ayana Prasad is a 28 year old female with multiple medical problems including bipolar disorder, depression, polysubstance abuse, posttraumatic stress disorder, and borderline personality disorder, along with a traumatic brain injury who has been weak and dizzy most of the day today. The patient apparently woke up at 5AM this morning and had a questionable seizure according to the patient's significant other. The patient subsequently does not remember that event but did go to work today and while at work continued to feel somewhat nauseated and dizzy in a non-vertiginous kind of way today. Patient apparently passed out in the street on the way to the ER and it is been reported the patient has had 6 episodes of decreased consciousness with some type of tremulous or shaking type activity. Patient has no tongue biting. No incontinence and denies any pain anywhere. Patient denies any fevers, any headache, and presents here to the ER for evaluation.     This part of the medical record was transcribed by Amanda Sanchez, Medical Scribe, from a dictation done by Dr. Beasley.     Past Medical History:   Diagnosis Date     ADHD (attention deficit hyperactivity disorder)      Bipolar 1 disorder      Borderline personality disorder      Cauda equina syndrome      Chronic low back pain      Depression      GERD (gastroesophageal reflux disease)      h/o TBI (traumatic brain injury)      Marginal corneal ulcer, left 7/17/2015     Nephrolithiasis      Polysubstance abuse - methamphetamine, hallucinagen, heroin, marijuana     currently in remission     PONV (postoperative nausea and vomiting)      PTSD (post-traumatic stress disorder)        Past Surgical History:   Procedure Laterality Date     BACK SURGERY - For Cauda Equina Syndrome   12/24/2016     COLONOSCOPY       COMBINED CYSTOSCOPY, INSERT STENT URETER(S) Left 8/30/2018    Procedure: COMBINED CYSTOSCOPY, INSERT STENT URETER(S);  Cystoscopy With Left Stent Placement;  Surgeon: Kiran Ulrich MD;  Location: [facility]     COMBINED CYSTOSCOPY, RETROGRADES, EXCHANGE STENT URETER(S) Left 10/14/2018    Procedure: COMBINED CYSTOSCOPY, RETROGRADES, EXCHANGE STENT URETER(S);  Cystoscopy and removal of left-sided stent.;  Surgeon: Stiven Olivo MD;  Location:  OR     COMBINED CYSTOSCOPY, RETROGRADES, URETEROSCOPY, INSERT STENT  1/6/2014    Procedure: COMBINED CYSTOSCOPY, RETROGRADES, URETEROSCOPY, INSERT STENT;  Cystyoscopy place left ureteral stent;  Surgeon: Jaun Kimble MD;  Location: [facility]     COMBINED CYSTOSCOPY, RETROGRADES, URETEROSCOPY, INSERT STENT Left 10/23/2018    Procedure: Video cystoscopy, left ureteroscopy with stone extraction;  Surgeon: Bull Mast MD;  Location:  OR     CYSTOSCOPY, URETEROSCOPY, COMBINED Right 9/23/2015    Procedure: COMBINED CYSTOSCOPY, URETEROSCOPY;  Surgeon: ROME Jett MD;  Location: [facility]     ENT SURGERY       ESOPHAGOSCOPY, GASTROSCOPY, DUODENOSCOPY (EGD), COMBINED  4/8/2013    Procedure: COMBINED ESOPHAGOSCOPY, GASTROSCOPY, DUODENOSCOPY (EGD), BIOPSY SINGLE OR MULTIPLE;  Gastroscopy;  Surgeon: Peter Pickard MD;  Location: [facility]     ESOPHAGOSCOPY, GASTROSCOPY, DUODENOSCOPY (EGD), COMBINED Left 10/28/2014    Procedure: COMBINED ESOPHAGOSCOPY, GASTROSCOPY, DUODENOSCOPY (EGD), BIOPSY SINGLE OR MULTIPLE;  Surgeon: Nacrisa Ramirez MD;  Location:  OR     ESOPHAGOSCOPY, GASTROSCOPY, DUODENOSCOPY (EGD), COMBINED N/A 12/24/2018    Procedure: COMBINED ESOPHAGOSCOPY, GASTROSCOPY, DUODENOSCOPY (EGD), BIOPSY SINGLE OR MULTIPLE;  Surgeon: Sonu Verduzco MD;  Location: [facility]     LAPAROSCOPIC CHOLECYSTECTOMY  11/20/2014    [facility], Dr. Ramirez     LASER HOLMIUM LITHOTRIPSY URETER(S), INSERT STENT, COMBINED   4/2/2014    Procedure: COMBINED CYSTOSCOPY, URETEROSCOPY, LASER HOLMIUM LITHOTRIPSY URETER(S), INSERT STENT;  Cystoscopy,Left Ureteral Stent Removal,Left Ureteroscopy with Laser Lithotripsy,Left Ureter Stent Placement;  Surgeon: ROME Jett MD;  Location: [facility]     Transurethral stone resection  03/11/2011       Family History   Problem Relation Age of Onset     Gastrointestinal Disease Father         Crohn's Disease     Cancer Father         Liver Cancer     Other Cancer Father         Liver     Cancer Maternal Grandmother         lympoma     Diabetes Maternal Grandmother      Mental Illness Maternal Grandmother      Other Cancer Maternal Grandmother         Non Hodgkins Lymphoma     Diabetes Maternal Grandfather      Hypertension Maternal Grandfather      Prostate Cancer Maternal Grandfather      Hyperlipidemia Maternal Grandfather      Heart Disease Paternal Grandfather         heart disease     Hypertension Brother      Other Cancer Brother         Esophegeal cancer     Diabetes Brother      Hyperlipidemia Mother      Mental Illness Mother      Anxiety Disorder Mother      Anesthesia Reaction Mother      Hypertension Brother      Other Cancer Brother      Other Cancer Paternal Half-Brother         Esophageal       Social History     Tobacco Use     Smoking status: Former Smoker     Packs/day: 0.50     Years: 5.00     Pack years: 2.50     Types: Other     Start date: 8/1/2011     Smokeless tobacco: Current User     Types: Chew   Substance Use Topics     Alcohol use: No     Alcohol/week: 0.0 oz        Allergies   Allergen Reactions     Haldol [Haloperidol] Other (See Comments)     Makes patient very angry and anxious     Seroquel [Quetiapine] Palpitations     Spent 2 weeks in the hospital due to having seroquel, caused palpitations and QT prolongation     Adhesive Tape Hives     Percocet [Oxycodone-Acetaminophen] Nausea and Vomiting     Prednisone Other (See Comments) and Hives     Suicidal ideation      Risperidone Other (See Comments)     Droperidol Anxiety       Dose / Directions   AMINO ACID PO      Refills:  0     eszopiclone 2 MG tablet  Commonly known as:  LUNESTA  Used for:  Bipolar 1 disorder, manic, mild, Schizoaffective disorder, bipolar type (H)      Dose:  2 mg  Take 1 tablet (2 mg) by mouth nightly as needed for sleep  Quantity:  30 tablet  Refills:  0     fluPHENAZine 1 MG tablet  Commonly known as:  PROLIXIN  Used for:  Bipolar 1 disorder, manic, mild, Schizoaffective disorder, bipolar type (H)      Dose:  1 mg  Take 1 tablet (1 mg) by mouth 2 times daily  Quantity:  60 tablet  Refills:  1     fluPHENAZine decanoate 25 MG/ML injection  Commonly known as:  PROLIXIN  Used for:  Schizoaffective disorder, bipolar type (H)      Dose:  2.5 mg  Inject 0.1 mLs (2.5 mg) into the muscle every 21 days  Quantity:  5 mL  Refills:  0     gabapentin 300 MG capsule  Commonly known as:  NEURONTIN  Used for:  Bipolar 1 disorder, manic, mild      Dose:  600 mg  Take 2 capsules (600 mg) by mouth 3 times daily  Quantity:  180 capsule  Refills:  1     hydrOXYzine 50 MG tablet  Commonly known as:  ATARAX  Used for:  Bipolar 1 disorder, manic, mild      Dose:  50 mg  Take 1 tablet (50 mg) by mouth every 4 hours as needed for anxiety  Quantity:  30 tablet  Refills:  1     lithium  MG CR tablet  Commonly known as:  LITHOBID  Used for:  Schizoaffective disorder, bipolar type (H)      Dose:  600 mg  Take 2 tablets (600 mg) by mouth At Bedtime  Quantity:  60 tablet  Refills:  1     LORazepam 1 MG tablet  Commonly known as:  ATIVAN  Used for:  Bipolar 1 disorder, manic, mild, Tobacco abuse, Schizoaffective disorder, bipolar type (H)      Dose:  1 mg  Take 1 tablet (1 mg) by mouth 2 times daily as needed for anxiety  Quantity:  60 tablet  Refills:  0     nicotine 21 MG/24HR 24 hr patch  Commonly known as:  NICODERM CQ  Used for:  Tobacco abuse      Dose:  1 patch  Place 1 patch onto the skin daily  Quantity:  30  patch  Refills:  1     nicotine polacrilex 4 MG gum  Commonly known as:  NICORETTE  Used for:  Tobacco abuse      Dose:  4 mg  Place 1 each (4 mg) inside cheek every hour as needed for other (nicotine withdrawal symptoms)  Quantity:  100 tablet  Refills:  1     OLANZapine 10 MG tablet  Commonly known as:  zyPREXA  Used for:  Schizoaffective disorder, bipolar type (H)      Dose:  10 mg  Take 1 tablet (10 mg) by mouth 2 times daily as needed (Agitation)  Quantity:  60 tablet  Refills:  1     ondansetron 4 MG tablet  Commonly known as:  ZOFRAN  Used for:  Tobacco abuse      Dose:  4 mg  Take 1 tablet (4 mg) by mouth every 8 hours as needed for nausea  Quantity:  30 tablet  Refills:  1     PREBIOTIC PRODUCT PO      Dose:  1 capsule  Take 1 capsule by mouth daily  Refills:  0     traZODone 50 MG tablet  Commonly known as:  DESYREL  Used for:  Schizoaffective disorder, bipolar type (H)      Dose:  50 mg  Take 1 tablet (50 mg) by mouth nightly as needed for sleep  Quantity:  30 tablet  Refills:  1                I have reviewed the Medications, Allergies, Past Medical and Surgical History, and Social History in the Epic system.    Review of Systems   Constitutional: Negative for fever.   Cardiovascular: Negative for chest pain.   Gastrointestinal: Positive for nausea. Negative for abdominal pain.   Musculoskeletal: Negative for back pain.   Neurological: Positive for dizziness, seizures and weakness. Negative for headaches.   All other systems reviewed and are negative.      Physical Exam   BP: 125/79  Pulse: 72  Temp: 98.1  F (36.7  C)  Resp: 16  Weight: 90.7 kg (200 lb)  SpO2: 97 %      Physical Exam   Constitutional: She is oriented to person, place, and time.   Alert conversant cooperative   HENT:   Head: Atraumatic.   Eyes: Pupils are equal, round, and reactive to light. EOM are normal.   Neck: Neck supple.   Cardiovascular: Regular rhythm.   Pulmonary/Chest: Breath sounds normal. She has no wheezes. She has no rales.    Abdominal: Soft. There is no tenderness.   Musculoskeletal: She exhibits no edema, tenderness or deformity.   Neurological: She is alert and oriented to person, place, and time. No cranial nerve deficit.   Grossly intact and symmetric   Skin: Skin is warm.   Psychiatric: She has a normal mood and affect.   Nursing note and vitals reviewed.      ED Course        Procedures           Patient was placed on cardiac monitor and oximetry.    IV was established for blood draw and fluid administration    EKG revealed a normal sinus rhythm at a rate of 73 with a MN interval 160 and a QRS duration of 0.090.  The patient had a normal axis with no acute ST or T wave changes significant for ischemia.    Results for orders placed or performed during the hospital encounter of 06/26/19   MR Brain w/o & w Contrast    Narrative    MRI OF THE BRAIN WITHOUT AND WITH CONTRAST 6/26/2019 9:21 PM     COMPARISON: Head CT 8/11/2015    HISTORY:  Altered level of consciousness (LOC), unexplained     TECHNIQUE: Multi-sequence, multi-planar MRI images of the brain were  acquired before and after the administration of IV gadolinium (9.0mL  Gadavist IV).    FINDINGS: There are a few tiny scattered focal areas of abnormal T2  signal hyperintensity in the deep and subcortical white matter of the  cerebral hemispheres bilaterally that are nonspecific with an  extensive differential including: Sequela of migraine headaches,  sequela of previous trauma, demyelinating disease (ADEM, MS),  infection (Lyme disease), and chronic small vessel ischemic disease  (found in patients with long-standing hypertension and/or diabetes).  Gray-white differentiation of the brain is otherwise within normal  limits.    The ventricles and basal cisterns are normal in configuration. There  is no midline shift. There are no extra-axial fluid collections. There  is no evidence for stroke or acute intracranial hemorrhage. There is  no abnormal contrast enhancement in the  brain or its coverings.    There is no acute sinusitis or mastoiditis.      Impression    IMPRESSION: Nonspecific cerebral white matter changes with  differential as above. Otherwise, normal brain MRI. No evidence for  acute intracranial pathology.      CBC with platelets differential   Result Value Ref Range    WBC 12.2 (H) 4.0 - 11.0 10e9/L    RBC Count 4.41 3.8 - 5.2 10e12/L    Hemoglobin 12.6 11.7 - 15.7 g/dL    Hematocrit 38.2 35.0 - 47.0 %    MCV 87 78 - 100 fl    MCH 28.6 26.5 - 33.0 pg    MCHC 33.0 31.5 - 36.5 g/dL    RDW 14.1 10.0 - 15.0 %    Platelet Count 379 150 - 450 10e9/L    Diff Method Automated Method     % Neutrophils 67.8 %    % Lymphocytes 23.6 %    % Monocytes 6.4 %    % Eosinophils 1.6 %    % Basophils 0.3 %    % Immature Granulocytes 0.3 %    Nucleated RBCs 0 0 /100    Absolute Neutrophil 8.3 1.6 - 8.3 10e9/L    Absolute Lymphocytes 2.9 0.8 - 5.3 10e9/L    Absolute Monocytes 0.8 0.0 - 1.3 10e9/L    Absolute Eosinophils 0.2 0.0 - 0.7 10e9/L    Absolute Basophils 0.0 0.0 - 0.2 10e9/L    Abs Immature Granulocytes 0.0 0 - 0.4 10e9/L    Absolute Nucleated RBC 0.0    Comprehensive metabolic panel   Result Value Ref Range    Sodium 139 133 - 144 mmol/L    Potassium 4.2 3.4 - 5.3 mmol/L    Chloride 108 94 - 109 mmol/L    Carbon Dioxide 25 20 - 32 mmol/L    Anion Gap 6 3 - 14 mmol/L    Glucose 77 70 - 99 mg/dL    Urea Nitrogen 19 7 - 30 mg/dL    Creatinine 0.87 0.52 - 1.04 mg/dL    GFR Estimate >90 >60 mL/min/[1.73_m2]    GFR Estimate If Black >90 >60 mL/min/[1.73_m2]    Calcium 9.8 8.5 - 10.1 mg/dL    Bilirubin Total 0.4 0.2 - 1.3 mg/dL    Albumin 3.9 3.4 - 5.0 g/dL    Protein Total 8.3 6.8 - 8.8 g/dL    Alkaline Phosphatase 54 40 - 150 U/L    ALT 40 0 - 50 U/L    AST PENDING 0 - 45 U/L   Alcohol ethyl   Result Value Ref Range    Ethanol g/dL <0.01 <0.01 g/dL   HCG qualitative Blood   Result Value Ref Range    HCG Qualitative Serum Negative NEG^Negative   Drug abuse screen 6 urine (chem dep)   Result  Value Ref Range    Amphetamine Qual Urine Negative NEG^Negative    Barbiturates Qual Urine Negative NEG^Negative    Benzodiazepine Qual Urine Negative NEG^Negative    Cannabinoids Qual Urine Positive (A) NEG^Negative    Cocaine Qual Urine Negative NEG^Negative    Ethanol Qual Urine Negative NEG^Negative    Opiates Qualitative Urine Negative NEG^Negative   UA reflex to Microscopic and Culture   Result Value Ref Range    Color Urine Straw     Appearance Urine Clear     Glucose Urine Negative NEG^Negative mg/dL    Bilirubin Urine Negative NEG^Negative    Ketones Urine Negative NEG^Negative mg/dL    Specific Gravity Urine 1.008 1.003 - 1.035    Blood Urine Negative NEG^Negative    pH Urine 7.5 (H) 5.0 - 7.0 pH    Protein Albumin Urine Negative NEG^Negative mg/dL    Urobilinogen mg/dL Normal 0.0 - 2.0 mg/dL    Nitrite Urine Negative NEG^Negative    Leukocyte Esterase Urine Negative NEG^Negative    Source Unspecified Urine    Troponin I   Result Value Ref Range    Troponin I ES <0.015 0.000 - 0.045 ug/L   Lithium level   Result Value Ref Range    Lithium Level 0.65 0.60 - 1.20 mmol/L   Glucose by meter   Result Value Ref Range    Glucose 85 70 - 99 mg/dL     Urine drug screen pending      Labs Ordered and Resulted from Time of ED Arrival Up to the Time of Departure from the ED   CBC WITH PLATELETS DIFFERENTIAL - Abnormal; Notable for the following components:       Result Value    WBC 12.2 (*)     All other components within normal limits   DRUG ABUSE SCREEN 6 CHEM DEP URINE ([facility]) - Abnormal; Notable for the following components:    Cannabinoids Qual Urine Positive (*)     All other components within normal limits   UA MACROSCOPIC WITH REFLEX TO MICRO AND CULTURE - Abnormal; Notable for the following components:    pH Urine 7.5 (*)     All other components within normal limits   GLUCOSE MONITOR NURSING POCT   COMPREHENSIVE METABOLIC PANEL   ALCOHOL ETHYL   HCG QUALITATIVE   TROPONIN I   LITHIUM LEVEL   GLUCOSE BY METER    NEURO CHECKS   CARDIAC CONTINUOUS MONITORING   PULSE OXIMETRY NURSING   PERIPHERAL IV CATHETER            Assessments & Plan (with Medical Decision Making)     I have reviewed the nursing notes.    Differential diagnosis on this patient includes seizure type activity versus pseudoseizure.  Patient has not had any kind of EEG or history of spells like this in the past.    Case was discussed with neurology who recommended transfer to the [facility] for probable admission but initial evaluation by neurology consultation in the ER first.    Case was then discussed with the ER staff on the [facility], Dr. Sun.  Patient will be transferred to the [facility] ER.    I have reviewed the findings, diagnosis, and plan with the patient.    Working diagnoses:   Spell of loss of consciousness - x6     Santy Beasley MD    6/26/2019   [facility], [place], EMERGENCY DEPARTMENT     Santy Beasley MD  06/26/19 [id number]

## 2019-06-27 NOTE — ED NOTES
Lab called and stated that blood was slightly hemolyzed so AST will have no result. K is also slightly hemolyzed. Message relayed to MD. Per MD no need to do another blood draw.
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
## 2019-06-27 NOTE — ED NOTES
This is a 28-year-old female who was evaluated at the [facility] for whole body shaking and loss of consciousness concerning for seizure activity.  She was sent for neurology evaluation who recommended admission for EEG.  She had no seizure activity in the ER and will be admitted to the neurology service.      Dylan Sun MD  06/27/19 0127

## 2019-06-27 NOTE — PLAN OF CARE
Status: Admitted for characterization of spells  VS: VSS on RA  Neuros: A&Ox4. Baseline dec. Sensation  in R leg d/t previous injury  GI: Tolerating regular diet. BM x1   : Feels like she is unable to empty bladder, bladder scan at 1730 275, continue to monitor  IV: PIV SL  Activity: SBA w/gb  Pain: Back cramp x1, tylenol given  Skin: EEG leads intact  Labs/Tests: No events noted this shift, compliant w/sz precautions  Social: Wife/daughter at bedside, supportive  Plan of care: Continue to monitor urine output and seizure-like activity

## 2019-06-27 NOTE — ED NOTES
[facility], [place]   ED Nurse to Floor Handoff     Ayana Prasad is a 28 year old female who speaks English and lives with a spouse,  in a home  They arrived in the ED by ambulance from emergency room    ED Chief Complaint: Seizures (Wife states that Ayana has been having several seizures at home.  Witnessed 6 seizures. Patient does not remember.)    ED Dx;   Final diagnoses:   Spell of loss of consciousness - x6         Needed?: No    Allergies:   Allergies   Allergen Reactions     Haldol [Haloperidol] Other (See Comments)     Makes patient very angry and anxious     Seroquel [Quetiapine] Palpitations     Spent 2 weeks in the hospital due to having seroquel, caused palpitations and QT prolongation     Adhesive Tape Hives     Percocet [Oxycodone-Acetaminophen] Nausea and Vomiting     Prednisone Other (See Comments) and Hives     Suicidal ideation     Risperidone Other (See Comments)     Droperidol Anxiety   .  Past Medical Hx:   Past Medical History:   Diagnosis Date     ADHD (attention deficit hyperactivity disorder)      Bipolar 1 disorder      Borderline personality disorder      Cauda equina syndrome      Chronic low back pain      Depression      GERD (gastroesophageal reflux disease)      h/o TBI (traumatic brain injury)      Marginal corneal ulcer, left 7/17/2015     Nephrolithiasis      Polysubstance abuse - methamphetamine, hallucinagen, heroin, marijuana     currently in remission     PONV (postoperative nausea and vomiting)      PTSD (post-traumatic stress disorder)       Baseline Mental status: WDL  Current Mental Status changes: at basesline    Infection present or suspected this encounter: no  Sepsis suspected: No  Isolation type: No active isolations     Activity level - Baseline/Home:  Independent  Activity Level - Current:   Independent    Bariatric equipment needed?: No    In the ED these meds were given:   Medications   sodium chloride 0.9% infusion (  Intravenous Rate/Dose Verify 6/26/19 2252)   LORazepam (ATIVAN) injection 1 mg (1 mg Intravenous Given 6/26/19 2045)   ondansetron (ZOFRAN) injection 4 mg (4 mg Intravenous Given 6/26/19 2049)   gadobutrol (GADAVIST) injection 10 mL (9 mLs Intravenous Given 6/26/19 2056)   sodium chloride (PF) 0.9% PF flush 10 mL (10 mLs Intravenous Given 6/26/19 2056)       Drips running?  Yes (NS)    Home pump  No    Current LDAs  Peripheral IV 06/26/19 Right Hand (Active)   Site Assessment WDL 6/26/2019 11:00 PM   Line Status Infusing 6/26/2019 11:00 PM   Phlebitis Scale 0-->no symptoms 6/26/2019 11:00 PM   Infiltration Scale 0 6/26/2019 11:00 PM   Infiltration Site Treatment Method  None 6/26/2019 11:00 PM   Extravasation? No 6/26/2019 11:00 PM   Dressing Intervention New dressing  6/26/2019 11:00 PM   Number of days: 1       Ureteral Drain/Stent Left ureter 6 fr (Active)   Number of days: 301       Labs results:   Labs Ordered and Resulted from Time of ED Arrival Up to the Time of Departure from the ED   CBC WITH PLATELETS DIFFERENTIAL - Abnormal; Notable for the following components:       Result Value    WBC 12.2 (*)     All other components within normal limits   DRUG ABUSE SCREEN 6 CHEM DEP URINE ([facility]) - Abnormal; Notable for the following components:    Cannabinoids Qual Urine Positive (*)     All other components within normal limits   UA MACROSCOPIC WITH REFLEX TO MICRO AND CULTURE - Abnormal; Notable for the following components:    pH Urine 7.5 (*)     All other components within normal limits   GLUCOSE MONITOR NURSING POCT   COMPREHENSIVE METABOLIC PANEL   ALCOHOL ETHYL   HCG QUALITATIVE   TROPONIN I   LITHIUM LEVEL   GLUCOSE BY METER   NEURO CHECKS   CARDIAC CONTINUOUS MONITORING   PULSE OXIMETRY NURSING   PERIPHERAL IV CATHETER       Imaging Studies:   Recent Results (from the past 24 hour(s))   MR Brain w/o & w Contrast    Narrative    MRI OF THE BRAIN WITHOUT AND WITH CONTRAST 6/26/2019 9:21 PM     COMPARISON:  Head CT 8/11/2015    HISTORY:  Altered level of consciousness (LOC), unexplained     TECHNIQUE: Multi-sequence, multi-planar MRI images of the brain were  acquired before and after the administration of IV gadolinium (9.0mL  Gadavist IV).    FINDINGS: There are a few tiny scattered focal areas of abnormal T2  signal hyperintensity in the deep and subcortical white matter of the  cerebral hemispheres bilaterally that are nonspecific with an  extensive differential including: Sequela of migraine headaches,  sequela of previous trauma, demyelinating disease (ADEM, MS),  infection (Lyme disease), and chronic small vessel ischemic disease  (found in patients with long-standing hypertension and/or diabetes).  Gray-white differentiation of the brain is otherwise within normal  limits.    The ventricles and basal cisterns are normal in configuration. There  is no midline shift. There are no extra-axial fluid collections. There  is no evidence for stroke or acute intracranial hemorrhage. There is  no abnormal contrast enhancement in the brain or its coverings.    There is no acute sinusitis or mastoiditis.      Impression    IMPRESSION: Nonspecific cerebral white matter changes with  differential as above. Otherwise, normal brain MRI. No evidence for  acute intracranial pathology.       JUAREZ AGUIRRE MD       Recent vital signs:   /71   Pulse 88   Temp 98.1  F (36.7  C) (Oral)   Resp 28   Wt 90.7 kg (200 lb)   LMP  (LMP Unknown)   SpO2 95%   Breastfeeding? No   BMI 31.32 kg/m      [place] Coma Scale Score: 15 (06/26/19 2323)       Cardiac Rhythm: Normal Sinus  Pt needs tele? No Not currently ordered  Skin/wound Issues: None    Code Status: Full Code -see prior    Pain control: pt had none    Nausea control: fair    Abnormal labs/tests/findings requiring intervention: n/a    Family present during ED course? No   Family Comments/Social Situation comments: n/a    Tasks needing completion: None    Lavinia OQUENDO  SRINIVAS Ocasio  ascom -- *[phone]  [phone]

## 2019-06-27 NOTE — ED NOTES
Pt's wife is available by phone if needing further collateral information about the seizures that occurred today.  Sergio : cell phone: [phone]

## 2019-06-27 NOTE — PLAN OF CARE
Patient arrived from ED at 0600 for characterization of seizures, VSS, no events, plan for VEEG this AM, continue to monitor and follow POC.

## 2019-06-27 NOTE — PHARMACY-ADMISSION MEDICATION HISTORY
Admission medication history interview status for the 6/26/2019 admission is complete. See Epic admission navigator for allergy information, pharmacy, prior to admission medications and immunization status.     Medication history interview sources:  patient, pharmacy dispense record, chart review    Changes made to PTA medication list (reason)  Added: none  Deleted: none  Changed: lithium dosing (patient is taking lithium ER, 300 mg in the morning and 600 mg at bedtime)    Additional medication history information (including reliability of information, actions taken by pharmacist):  - Patient has not started fluphenazine decanoate injection yet. She should still be taking fluphenazine tablets, 1 mg twice daily.  - Patient has a prescription for Xulane patch but stated that she has not used it for months. Did not add to the [facility] med list.  - Patient was previously on guanfacine, but it was stopped recently due to dry mouth.      Prior to Admission medications    Medication Sig Last Dose Taking? Auth Provider   Amino Acids (AMINO ACID PO) Take 1 capsule by mouth daily Unknown formulation and dose. 6/26/2019 Yes Reported, Patient   eszopiclone (LUNESTA) 2 MG tablet Take 1 tablet (2 mg) by mouth nightly as needed for sleep 6/26/2019 Yes Mahad Diez MD   fluPHENAZine (PROLIXIN) 1 MG tablet Take 1 tablet (1 mg) by mouth 2 times daily 6/26/2019 Yes Mahad Diez MD   gabapentin (NEURONTIN) 300 MG capsule Take 2 capsules (600 mg) by mouth 3 times daily 6/26/2019 Yes Becki Avery APRN CNP   hydrOXYzine (ATARAX) 50 MG tablet Take 1 tablet (50 mg) by mouth every 4 hours as needed for anxiety PRN Yes Mahad Diez MD   lithium ER (LITHOBID) 300 MG CR tablet Take 300 mg by mouth every morning In addition to bedtime dose. 6/26/2019 Yes Unknown, Entered By History   lithium ER (LITHOBID) 300 MG CR tablet Take 2 tablets (600 mg) by mouth At Bedtime 6/26/2019 Yes Mahad Diez MD    LORazepam (ATIVAN) 1 MG tablet Take 1 tablet (1 mg) by mouth 2 times daily as needed for anxiety PRN Yes Mahad Diez MD   nicotine (NICODERM CQ) 21 MG/24HR 24 hr patch Place 1 patch onto the skin daily 6/26/2019 Yes Mahad Diez MD   nicotine polacrilex (NICORETTE) 4 MG gum Place 1 each (4 mg) inside cheek every hour as needed for other (nicotine withdrawal symptoms) PRN Yes Mahad Diez MD   OLANZapine (ZYPREXA) 10 MG tablet Take 1 tablet (10 mg) by mouth 2 times daily as needed (Agitation) PRN Yes Mahad Diez MD   ondansetron (ZOFRAN) 4 MG tablet Take 1 tablet (4 mg) by mouth every 8 hours as needed for nausea Past Week at Unknown time Yes Mahad Diez MD   PREBIOTIC PRODUCT PO Take 1 capsule by mouth daily Unknown formulation and dose. 6/26/2019 Yes Reported, Patient   traZODone (DESYREL) 50 MG tablet Take 1 tablet (50 mg) by mouth nightly as needed for sleep PRN Yes Mahad Diez MD   fluPHENAZine decanoate (PROLIXIN) 25 MG/ML injection Inject 0.1 mLs (2.5 mg) into the muscle every 21 days Not started yet  Callie Flores MD         Medication history completed by: Hilaria Rao, PharmD  6/27/2019 5:05 PM

## 2019-06-27 NOTE — ED NOTES
Bed: [facility]  Expected date:   Expected time:   Means of arrival:   Comments:  Ayana Prasad  1st time Seizure  Coming from [facility]

## 2019-06-28 ENCOUNTER — ALLIED HEALTH/NURSE VISIT (OUTPATIENT)
Dept: NEUROLOGY | Facility: CLINIC | Age: 29
End: 2019-06-28
Attending: PSYCHIATRY & NEUROLOGY
Payer: COMMERCIAL

## 2019-06-28 DIAGNOSIS — R56.9 OBSERVED SEIZURE-LIKE ACTIVITY (H): Primary | ICD-10-CM

## 2019-06-28 LAB
ALBUMIN UR-MCNC: NEGATIVE MG/DL
ANION GAP SERPL CALCULATED.3IONS-SCNC: 9 MMOL/L (ref 3–14)
APPEARANCE UR: CLEAR
BILIRUB UR QL STRIP: NEGATIVE
BUN SERPL-MCNC: 14 MG/DL (ref 7–30)
CALCIUM SERPL-MCNC: 10.6 MG/DL (ref 8.5–10.1)
CHLORIDE SERPL-SCNC: 105 MMOL/L (ref 94–109)
CO2 SERPL-SCNC: 22 MMOL/L (ref 20–32)
COLOR UR AUTO: NORMAL
CREAT SERPL-MCNC: 0.79 MG/DL (ref 0.52–1.04)
ERYTHROCYTE [DISTWIDTH] IN BLOOD BY AUTOMATED COUNT: 13.9 % (ref 10–15)
GFR SERPL CREATININE-BSD FRML MDRD: >90 ML/MIN/{1.73_M2}
GLUCOSE SERPL-MCNC: 98 MG/DL (ref 70–99)
GLUCOSE UR STRIP-MCNC: NEGATIVE MG/DL
HCT VFR BLD AUTO: 44.1 % (ref 35–47)
HGB BLD-MCNC: 14 G/DL (ref 11.7–15.7)
HGB UR QL STRIP: NEGATIVE
KETONES UR STRIP-MCNC: NEGATIVE MG/DL
LEUKOCYTE ESTERASE UR QL STRIP: NEGATIVE
MCH RBC QN AUTO: 27.8 PG (ref 26.5–33)
MCHC RBC AUTO-ENTMCNC: 31.7 G/DL (ref 31.5–36.5)
MCV RBC AUTO: 88 FL (ref 78–100)
NITRATE UR QL: NEGATIVE
PH UR STRIP: 7 PH (ref 5–7)
PLATELET # BLD AUTO: 419 10E9/L (ref 150–450)
POTASSIUM SERPL-SCNC: 4.3 MMOL/L (ref 3.4–5.3)
RBC # BLD AUTO: 5.03 10E12/L (ref 3.8–5.2)
RBC #/AREA URNS AUTO: <1 /HPF (ref 0–2)
SODIUM SERPL-SCNC: 136 MMOL/L (ref 133–144)
SOURCE: NORMAL
SP GR UR STRIP: 1.01 (ref 1–1.03)
TRANS CELLS #/AREA URNS HPF: <1 /HPF (ref 0–1)
UROBILINOGEN UR STRIP-MCNC: NORMAL MG/DL (ref 0–2)
WBC # BLD AUTO: 10.6 10E9/L (ref 4–11)
WBC #/AREA URNS AUTO: <1 /HPF (ref 0–5)

## 2019-06-28 PROCEDURE — 25000132 ZZH RX MED GY IP 250 OP 250 PS 637: Performed by: COUNSELOR

## 2019-06-28 PROCEDURE — 25000128 H RX IP 250 OP 636: Performed by: STUDENT IN AN ORGANIZED HEALTH CARE EDUCATION/TRAINING PROGRAM

## 2019-06-28 PROCEDURE — 12000001 ZZH R&B MED SURG/OB UMMC

## 2019-06-28 PROCEDURE — 25000132 ZZH RX MED GY IP 250 OP 250 PS 637: Performed by: EMERGENCY MEDICINE

## 2019-06-28 PROCEDURE — 36415 COLL VENOUS BLD VENIPUNCTURE: CPT | Performed by: PSYCHIATRY & NEUROLOGY

## 2019-06-28 PROCEDURE — 93005 ELECTROCARDIOGRAM TRACING: CPT

## 2019-06-28 PROCEDURE — 85027 COMPLETE CBC AUTOMATED: CPT | Performed by: PSYCHIATRY & NEUROLOGY

## 2019-06-28 PROCEDURE — 95951 ZZHC EEG VIDEO EACH 24 HR: CPT | Mod: ZF

## 2019-06-28 PROCEDURE — 81001 URINALYSIS AUTO W/SCOPE: CPT | Performed by: STUDENT IN AN ORGANIZED HEALTH CARE EDUCATION/TRAINING PROGRAM

## 2019-06-28 PROCEDURE — 25000131 ZZH RX MED GY IP 250 OP 636 PS 637: Performed by: STUDENT IN AN ORGANIZED HEALTH CARE EDUCATION/TRAINING PROGRAM

## 2019-06-28 PROCEDURE — 93010 ELECTROCARDIOGRAM REPORT: CPT | Performed by: INTERNAL MEDICINE

## 2019-06-28 PROCEDURE — 80048 BASIC METABOLIC PNL TOTAL CA: CPT | Performed by: PSYCHIATRY & NEUROLOGY

## 2019-06-28 PROCEDURE — 25000132 ZZH RX MED GY IP 250 OP 250 PS 637: Performed by: STUDENT IN AN ORGANIZED HEALTH CARE EDUCATION/TRAINING PROGRAM

## 2019-06-28 RX ORDER — LORAZEPAM 1 MG/1
1 TABLET ORAL 2 TIMES DAILY PRN
Status: DISCONTINUED | OUTPATIENT
Start: 2019-06-28 | End: 2019-06-29 | Stop reason: HOSPADM

## 2019-06-28 RX ORDER — FLUPHENAZINE HYDROCHLORIDE 1 MG/1
1 TABLET ORAL 2 TIMES DAILY
Status: DISCONTINUED | OUTPATIENT
Start: 2019-06-28 | End: 2019-06-29 | Stop reason: HOSPADM

## 2019-06-28 RX ADMIN — ACETAMINOPHEN 650 MG: 325 TABLET, FILM COATED ORAL at 23:07

## 2019-06-28 RX ADMIN — HYDROXYZINE HYDROCHLORIDE 50 MG: 25 TABLET ORAL at 09:04

## 2019-06-28 RX ADMIN — ENOXAPARIN SODIUM 40 MG: 40 INJECTION SUBCUTANEOUS at 07:43

## 2019-06-28 RX ADMIN — ONDANSETRON 4 MG: 4 TABLET, ORALLY DISINTEGRATING ORAL at 07:00

## 2019-06-28 RX ADMIN — FLUPHENAZINE HYDROCHLORIDE 1 MG: 1 TABLET, FILM COATED ORAL at 07:43

## 2019-06-28 RX ADMIN — ESZOPICLONE 3 MG: 1 TABLET, FILM COATED ORAL at 22:36

## 2019-06-28 RX ADMIN — FLUPHENAZINE HYDROCHLORIDE 1 MG: 1 TABLET, FILM COATED ORAL at 20:28

## 2019-06-28 RX ADMIN — LITHIUM CARBONATE 600 MG: 300 TABLET, EXTENDED RELEASE ORAL at 22:37

## 2019-06-28 RX ADMIN — ONDANSETRON 4 MG: 4 TABLET, ORALLY DISINTEGRATING ORAL at 13:09

## 2019-06-28 RX ADMIN — ONDANSETRON 4 MG: 4 TABLET, ORALLY DISINTEGRATING ORAL at 00:51

## 2019-06-28 RX ADMIN — LORAZEPAM 1 MG: 1 TABLET ORAL at 13:09

## 2019-06-28 RX ADMIN — GABAPENTIN 600 MG: 300 CAPSULE ORAL at 07:43

## 2019-06-28 RX ADMIN — GABAPENTIN 600 MG: 300 CAPSULE ORAL at 14:17

## 2019-06-28 RX ADMIN — HYDROXYZINE HYDROCHLORIDE 50 MG: 25 TABLET ORAL at 03:45

## 2019-06-28 RX ADMIN — LITHIUM CARBONATE 300 MG: 300 TABLET, EXTENDED RELEASE ORAL at 07:44

## 2019-06-28 RX ADMIN — GABAPENTIN 600 MG: 300 CAPSULE ORAL at 20:28

## 2019-06-28 ASSESSMENT — ACTIVITIES OF DAILY LIVING (ADL)
ADLS_ACUITY_SCORE: 15

## 2019-06-28 ASSESSMENT — VISUAL ACUITY
OU: NORMAL ACUITY
OU: NORMAL ACUITY

## 2019-06-28 NOTE — PLAN OF CARE
Admitted for characterization of spells. VEEG leads intact, no events reported or witnessed this shift. VSS on RA. A&Ox3, d/t to time (knew year). Neuros include: LLE 5- with weak plantar/moderate dorsiflexion and decreased sensation d/t previous lumbar injury and c/o dizziness. Tolerating regular diet. C/o nausea managed with PRN Zofran, cold packs, and aromatherapy. Unable to void overnight and requiring straight cathx1. No BM this shift. Up with SBA/GB. R. PIV, SL. C/o HA pain managed with PRN tylenol. PRN atarax given for anxiety. Continue to monitor and follow POC.      Straight cathed x2 this shift, last @0605 for 1,250mL

## 2019-06-28 NOTE — PLAN OF CARE
Status: Admitted for characterization of spells  VS: VSS on RA  Neuros: A&Ox4. Baseline dec. Sensation  in R leg d/t previous injury  GI: Tolerating regular diet.  : straight cath 1000cc   IV: PIV SL  Activity: SBA w/gb  Pain: denies   Skin: EEG leads intact  Labs/Tests: No events noted   Plan of care: Continue to monitor urine output and seizure-like activity

## 2019-06-28 NOTE — PLAN OF CARE
Status: Admitted for characterization of spells  VS: VSS on RA  Neuros: A&Ox4. Baseline decreased sensation  in L leg d/t previous injury  GI: Tolerating regular diet. Zofran given x1.   : Straight cathed x1 for 1000, providers notified as pt voiding large quantities. Styles placed this AM.   IV: PIV SL  Activity: SBA w/gb  Pain: Atarax given x1, ativan given x1.   Skin: EEG leads intact  Labs/Tests: No events noted this shift, compliant w/sz precautions  Social: No visitors this shift. Feeling anxious. Pt talking on the phone with family and friends.   Plan of care: Continue to monitor urine output and seizure-like activity

## 2019-06-28 NOTE — PROCEDURES
Procedure Date: 06/27/2019      EEG #-1      DATE OF RECORDING/SERVICE DATE:  06/27/2019      SOURCE FILE DURATION:  13:30:39      HISTORY:  This is day #1 of video EEG monitoring on a 28-year-old female with bipolar disorder and traumatic brain injury who is having multiple episodes of decreased consciousness, leading to emergency room visit.  The EEG was started for further characterization of events.      MEDICATIONS:  Lunesta 3 mg each day at bedtime, Prolixin 1 mg b.i.d., Neurontin 600 mg t.i.d., Lithium 300/600 mg, Stelazine 2 mg t.i.d., Atarax 50 mg given at 11:06, Trazodone 50 mg  at bedtime.      TECHNICAL SUMMARY: This continuous video- EEG monitoring procedure was performed with 23 scalp electrodes in 10-20 electrode system placement, and additional scalp, precordial and other surface electrodes used for electrical referencing and artifact detection.  Video monitoring was utilized and periodically reviewed by EEG technologists and the physician for electroclinical correlations.    INTERICTAL EEG ACTIVITY:  During wakefulness, there is a symmetric and reactive posterior dominant rhythm with frequencies ranging between 10 and 11 Hz of which attenuates with eye opening.  There is frequent blink artifact and myogenic artifact in the bifrontal temporal regions.  There is drowsiness seen on this day which consists of increased generalized theta as well as intermittent dropout of the posterior dominant rhythm.  No stage II sleep is captured on this day.  No focal slowing is seen.      ACTIVATION PROCEDURES:  Photic stimulation is performed on this day and there is seen photic driving response in multiple flash frequencies.  During photic stimulation, the patient has increased back pain leading to test being discontinued.      EPILEPTIFORM ACTIVITIES:  None.      ICTAL ACTIVITIES:  There are no target events captured on this day, but there are periods of neck cramps, back pain and dizziness seen throughout  the day while patient is lying down.  None of these events have any EEG changes from the baseline and no epileptiform discharges or ictal activities are seen.      IMPRESSION:  Normal.   No ictal events or epileptiform activity is noted.  No target events were captured on this day.  Events of neck pain or dizziness did not have an abnormal EEG correlate. Clinical correlation recommended.          This report is dictated by Ashu Russell DO, CNP fellow.   I personally reviewed the video EEG and agree with the reported findings.    Mary Shultz MD.                  D: 2019   T: 2019   MT: ROXI      Name:     SOMMER GARCIA   MRN:      [id number]        Account:        [id number]   :      1990           Procedure Date: 2019      Document: [id number]
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
## 2019-06-29 ENCOUNTER — APPOINTMENT (OUTPATIENT)
Dept: ULTRASOUND IMAGING | Facility: CLINIC | Age: 29
End: 2019-06-29
Attending: STUDENT IN AN ORGANIZED HEALTH CARE EDUCATION/TRAINING PROGRAM
Payer: COMMERCIAL

## 2019-06-29 ENCOUNTER — ALLIED HEALTH/NURSE VISIT (OUTPATIENT)
Dept: NEUROLOGY | Facility: CLINIC | Age: 29
End: 2019-06-29
Attending: PSYCHIATRY & NEUROLOGY
Payer: COMMERCIAL

## 2019-06-29 VITALS
SYSTOLIC BLOOD PRESSURE: 134 MMHG | HEART RATE: 72 BPM | OXYGEN SATURATION: 97 % | DIASTOLIC BLOOD PRESSURE: 77 MMHG | WEIGHT: 200 LBS | RESPIRATION RATE: 16 BRPM | BODY MASS INDEX: 31.32 KG/M2 | TEMPERATURE: 97.6 F

## 2019-06-29 DIAGNOSIS — R56.9 OBSERVED SEIZURE-LIKE ACTIVITY (H): Primary | ICD-10-CM

## 2019-06-29 PROBLEM — R33.9 URINARY RETENTION: Status: ACTIVE | Noted: 2019-06-29

## 2019-06-29 LAB
ANION GAP SERPL CALCULATED.3IONS-SCNC: 7 MMOL/L (ref 3–14)
BUN SERPL-MCNC: 18 MG/DL (ref 7–30)
CALCIUM SERPL-MCNC: 9.4 MG/DL (ref 8.5–10.1)
CHLORIDE SERPL-SCNC: 110 MMOL/L (ref 94–109)
CO2 SERPL-SCNC: 21 MMOL/L (ref 20–32)
CREAT SERPL-MCNC: 0.91 MG/DL (ref 0.52–1.04)
GFR SERPL CREATININE-BSD FRML MDRD: 86 ML/MIN/{1.73_M2}
GLUCOSE SERPL-MCNC: 104 MG/DL (ref 70–99)
POTASSIUM SERPL-SCNC: 3.7 MMOL/L (ref 3.4–5.3)
SODIUM SERPL-SCNC: 138 MMOL/L (ref 133–144)

## 2019-06-29 PROCEDURE — 36415 COLL VENOUS BLD VENIPUNCTURE: CPT | Performed by: PSYCHIATRY & NEUROLOGY

## 2019-06-29 PROCEDURE — 76770 US EXAM ABDO BACK WALL COMP: CPT

## 2019-06-29 PROCEDURE — 80048 BASIC METABOLIC PNL TOTAL CA: CPT | Performed by: PSYCHIATRY & NEUROLOGY

## 2019-06-29 PROCEDURE — 25000128 H RX IP 250 OP 636: Performed by: STUDENT IN AN ORGANIZED HEALTH CARE EDUCATION/TRAINING PROGRAM

## 2019-06-29 PROCEDURE — 95951 ZZHC EEG VIDEO < 12 HR: CPT | Mod: 52,ZF

## 2019-06-29 PROCEDURE — 25000132 ZZH RX MED GY IP 250 OP 250 PS 637: Performed by: STUDENT IN AN ORGANIZED HEALTH CARE EDUCATION/TRAINING PROGRAM

## 2019-06-29 PROCEDURE — 25000132 ZZH RX MED GY IP 250 OP 250 PS 637: Performed by: COUNSELOR

## 2019-06-29 PROCEDURE — 25000131 ZZH RX MED GY IP 250 OP 636 PS 637: Performed by: STUDENT IN AN ORGANIZED HEALTH CARE EDUCATION/TRAINING PROGRAM

## 2019-06-29 RX ADMIN — FLUPHENAZINE HYDROCHLORIDE 1 MG: 1 TABLET, FILM COATED ORAL at 09:29

## 2019-06-29 RX ADMIN — GABAPENTIN 600 MG: 300 CAPSULE ORAL at 09:29

## 2019-06-29 RX ADMIN — GABAPENTIN 600 MG: 300 CAPSULE ORAL at 13:41

## 2019-06-29 RX ADMIN — ENOXAPARIN SODIUM 40 MG: 40 INJECTION SUBCUTANEOUS at 09:28

## 2019-06-29 RX ADMIN — LITHIUM CARBONATE 300 MG: 300 TABLET, EXTENDED RELEASE ORAL at 09:29

## 2019-06-29 RX ADMIN — LORAZEPAM 1 MG: 1 TABLET ORAL at 13:41

## 2019-06-29 RX ADMIN — ONDANSETRON 4 MG: 4 TABLET, ORALLY DISINTEGRATING ORAL at 12:03

## 2019-06-29 ASSESSMENT — ACTIVITIES OF DAILY LIVING (ADL)
ADLS_ACUITY_SCORE: 15
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
## 2019-06-29 NOTE — PLAN OF CARE
Status: Admitted for characterization of spells  VS: VSS on RA  Neuros: A&O x4. Baseline decreased sensation  in L leg d/t previous injury  GI: Regular?  : Styles in place w/ good output  IV: PIV SL  Activity: SBA w/ GB  Pain: Denies  Skin: EEG leads intact  Labs/Tests: No events noted this shift, compliant w/ sz precautions  Social: No family overnight?  Plan of care: Continue to monitor & follow POC

## 2019-06-29 NOTE — PLAN OF CARE
Event this evening about 2230 when she pushed her button.  I was not aware of her pushing button ass I was going into her room with nighttime meds.  She was lying flat on her back and I thought she was sleeping She did not initially respond when I called her name.  She then was able to tell me her name, thought she was at [facility] and stated she did not feel well and was very hot.  She then turned onto her right side, toward me, and began shaking.  I especially noted her legs shaking.  This did not last more than several seconds.  She then told me her name, knew she was in hospital and the date.  Able to follow commands.. MD notified.  Also stated she is left flank pain and has had kidney stones in past.  States this is not that severe pain.  Renal ultrasound ordered.  Continue VEEG monitoring.

## 2019-06-29 NOTE — DISCHARGE SUMMARY
[facility]  NEUROLOGY DISCHARGE SUMMARY    Patient Name:  Ayana Prasad  MRN:  [id number]      :  1990      Date of Admission:  2019  Date of Discharge:  2019  5:00 PM  Admitting Physician:  Kathie Sandhu MD  Discharge Physician:  Kathie Sandhu MD  Primary Care Provider:   Becki Avery  Discharge Disposition:   Discharged to home    Admission Diagnoses:  Spells of altered consciousness    Discharge Diagnoses:    Spells of altered consciousness  Urinary retention    Brief History of Illness:   This is a 20-year-old woman with a significant past medical history for psychiatric disorders who presented on 19 with a 1 day history of repeated episodes of altered consciousness.    Hospital Course:     Spells of decreased consciousness: Patient did have a spell while admitted though no epileptiform activity captured on video EEG. Likely due to conversion disorder. Outpatient seizure precautions including driving restriction for 3 months, as well as not climbing heights, working with power tools, swimming, holding children at height, cooking with pots of boiling water or oil, or any other activities that could cause harm to herself or others if she were to suddenly lose consciousness.     Schizoaffective disorder  Bipolar disorder: Hydroxyzine initially given for anxiety, but held in the setting of urinary retention. PTA Ativan 1 mg PO BID was resumed on  after hydroxyzine discontinued. PTA lithium continued during inpatient admission.      Urinary retention: Polyuria and urinary retention noted overnight/in the early morning on  (had to straight cath for >1 L multiple times just a few hours apart). Patient had multiple doses of hydroxyzine for anxiety on  after the clinical decision was made to avoid Ativan; suspect hydroxyzine as likely cause of retention. Considered fluphenazine as well, though patient has been taking  this now for 3-4 weeks without issue. Additionally, pt reported a history of urinary retention due to prior cauda equina. States she has urine retention at baseline but was on at severe. Hydroxyzine was discontinued and Ferreira was placed. Patient failed voiding trial. Discussed with urology - pt discharged with ferreira with plan for urology follow up in clinic.    Insomnia: PTA Lunesta held during inpatient admission. Patient given melatonin 1 mg nightly and trazodone 50 mg at bedtime as needed.     Nicotine use disorder: Nicotine patch made available as needed.     Chronic pain: PTA gabapentin 600 mg PO TID continued during inpatient admission.     Pertinent Investigations:  EEG (6/27-6/29):  IMPRESSION:  Normal.  There is no focal slowing, and no target events were captured.  No events of neck pain or dizziness had any EEG change from background.  No ictal events or epileptiform activity is otherwise noted.     Consultations:    None    Recommendations and Follow-up:  1. Follow up with Urology in 1 week.  2. Follow up with her outpatient psychiatrist.    Discharge physical examination:   /77 (BP Location: Left arm)   Pulse 72   Temp 97.6  F (36.4  C) (Oral)   Resp 16   Wt 90.7 kg (200 lb)   LMP  (LMP Unknown)   SpO2 97%   Breastfeeding? No   BMI 31.32 kg/m    General: Adult female patient, lying in bed, NAD.  HEENT: NC/AT, no icterus, op pink and moist  Cardiac: RRR  Chest: non-labored on RA  Abdomen: S/NT/ND  Extremities: Warm, no edema  Skin: No rash or lesion   Psych: Mood pleasant, affect congruent  Neuro:  Mental status: Awake, alert, attentive, oriented to self, time, place, and circumstance. Language is fluent and coherent with intact comprehension of complex commands.  Cranial nerves: VFF, PERRL, conjugate gaze, EOMI, no dysarthria.   Motor: Normal bulk and tone. No abnormal movements. 5/5 strength in 4/4 extremities.   Reflexes: Normoreflexic and symmetric biceps, brachioradialis, triceps,  patellae, and achilles.  Sensory: Intact to light touch   Coordination: FNF intact without dysmetria. Rapid alternating movements intact.   Gait: Deferred.     Discharge Medications:  Discharge Medication List as of 6/29/2019  4:28 PM      CONTINUE these medications which have NOT CHANGED    Details   Amino Acids (AMINO ACID PO) Take 1 capsule by mouth daily Unknown formulation and dose., Historical      eszopiclone (LUNESTA) 2 MG tablet Take 1 tablet (2 mg) by mouth nightly as needed for sleep, Disp-30 tablet, R-0, Local Print      fluPHENAZine (PROLIXIN) 1 MG tablet Take 1 tablet (1 mg) by mouth 2 times daily, Disp-60 tablet, R-1, E-Prescribe      gabapentin (NEURONTIN) 300 MG capsule Take 2 capsules (600 mg) by mouth 3 times daily, Disp-180 capsule, R-1, E-Prescribe      !! lithium ER (LITHOBID) 300 MG CR tablet Take 300 mg by mouth every morning In addition to bedtime dose., Historical      !! lithium ER (LITHOBID) 300 MG CR tablet Take 2 tablets (600 mg) by mouth At Bedtime, Disp-60 tablet, R-1, E-Prescribe      LORazepam (ATIVAN) 1 MG tablet Take 1 tablet (1 mg) by mouth 2 times daily as needed for anxiety, Disp-60 tablet, R-0, Local Print      nicotine (NICODERM CQ) 21 MG/24HR 24 hr patch Place 1 patch onto the skin daily, Disp-30 patch, R-1, E-Prescribe      nicotine polacrilex (NICORETTE) 4 MG gum Place 1 each (4 mg) inside cheek every hour as needed for other (nicotine withdrawal symptoms), Disp-100 tablet, R-1, E-Prescribe      OLANZapine (ZYPREXA) 10 MG tablet Take 1 tablet (10 mg) by mouth 2 times daily as needed (Agitation), Disp-60 tablet, R-1, E-Prescribe      ondansetron (ZOFRAN) 4 MG tablet Take 1 tablet (4 mg) by mouth every 8 hours as needed for nausea, Disp-30 tablet, R-1, E-Prescribe      PREBIOTIC PRODUCT PO Take 1 capsule by mouth daily Unknown formulation and dose., Historical      traZODone (DESYREL) 50 MG tablet Take 1 tablet (50 mg) by mouth nightly as needed for sleep, Disp-30 tablet,  R-1, E-Prescribe      fluPHENAZine decanoate (PROLIXIN) 25 MG/ML injection Inject 0.1 mLs (2.5 mg) into the muscle every 21 days, Disp-5 mL, R-0, Injection       !! - Potential duplicate medications found. Please discuss with provider.      STOP taking these medications       hydrOXYzine (ATARAX) 50 MG tablet Comments:   Reason for Stopping:               Discharge follow up and instructions:     Urology Adult Referral      Reason for your hospital stay    You were admitted due to spells concerning for seizures. Based on monitoring, it does not appear you are having seizures. However, it is important that you still continue seizure precautions as you may have had one before your hospitalization. While here, you were noted to have difficulties with urinating. As such, you are going home with a ferreira catheter and then should follow up with the urology clinic as an outpatient.     Adult [facility] Follow-up and recommended labs and tests    Follow up with Urology in 1 week.  Follow up with your psychiatrist within 1-2 weeks.    Appointments on [facility] and/or [facility] (with [facility] or [facility] provider or service). Call [phone] if you haven't heard regarding these appointments within 7 days of discharge.     Activity    I reviewed with the patient in detail [place] regulations on seizures and driving. Patient appeared to clearly understand that they prohibited from operating a motor vehicle within 3 months following any seizure or other episode with sudden unconsciousness and that they are required to report any future such seizure to the DMV within 30 days after the event. I also recommended that patient and family review all other activities, and avoid any activities that might lead to self-injury or injury of others.  Such activities include but are not limited to holding babies or young children at heights from which they might be injured if dropped, bathing infants or young children in situations in which  they might drown without continuous interactive care by an adult who is fully capable at all times during the bath, operating power cutting or other tools, handling firearms, exposure to heights from which she might fall, exposure to vessels with hot cooking oil or water, and swimming alone.     Tubes and drains    You are going home with the following tubes or drains: ferreira catheter.     Diet    Follow this diet upon discharge: Orders Placed This Encounter      Combination Diet Regular Diet Adult       Patient seen and discussed with Dr. Sandhu.    Monse Barfield MD  Neurology PGY-2  [phone]
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
## 2019-06-29 NOTE — PROGRESS NOTES
"Short overnight progress note    I was called to patient's bedside several minutes after the a spell with shaking/change in cognition occurred, so all abnormal movements and change of consciousness had resolved by the time I arrived.  On my exam several minutes later, patient was able to repeat a phrase with some prompting, able to follow simple commands like pointing at the ceiling, though had trouble recollecting a word roughly 60 seconds after it was asked.  She also could not recognize this word from multiple choice options.  EEG was reviewed from around 22: 30 which shows that the event button was pushed on 2 occasions.  Patient is observed shaking for a couple of seconds with an arched back.  When asked to read the card she shakes her head no, and when asked to point at the ceiling she is able to do so.  When asked to repeat a phrase she says \"no.\"    Prior to this I was also notified that Ms. Prasad had left flank pain.  I assessed her at bedside and she explained that her urinary symptoms actually had been recognized in the past despite initially denying this during her admission.  She was known to have some urinary retention when she was admitted for rehab following her injury to the cauda equina.  She is also had kidney stones in the past and describes that she has mild pain now that does not feel as severe as when she had an identified kidney stone.  Her urinalysis was reviewed which was normal, and her temperatures here have been within normal limits despite the patient describing feeling very hot during the spell described above.  - Renal ultrasound was ordered to assess for hydronephrosis given her ongoing urinary retention as well as history of kidney stones    Pan Chandra PGY2   Neurology Resident   "

## 2019-06-29 NOTE — PROCEDURES
Procedure Date: 06/29/2019      EEG #-2, DAY #2 OF 24-hour video EEG.       DATE OF RECORDING/SERVICE DATE:  6/28/2019.       SOURCE FILE DURATION:  23 hours 39 minutes 21 seconds.        HISTORY:  This is day #2 of video EEG monitoring on a 28-year-old female with bipolar disorder and traumatic brain injury who is having multiple episodes of decreased consciousness, leading to emergency room visit.  The EEG was performed for evaluation of seizures.    TECHNICAL SUMMARY: This continuous video- EEG monitoring procedure was performed with 23 scalp electrodes in 10-20 electrode system placement, and additional scalp, precordial and other surface electrodes used for electrical referencing and artifact detection.  Video monitoring was utilized and periodically reviewed by EEG technologists and the physician for electroclinical correlations.     INTERICTAL ACTIVITY:  During maximal wakefulness, there was 10 Hz alpha activity over the posterior head regions which was symmetric and attenuated by eye opening.  Drowsiness was manifested as dropout of the posterior dominant rhythm and diffuse theta activity and horizontal eye movements.  Stage II sleep was manifested as vertex waves, symmetric sleep spindles and K complexes.     CLINICAL/ICTAL EVENTS:  The patient had an event at 22:24.  She was lying on the bed.  She pushed the event button and then put her hands on top of her head and started shaking and arching her back.  When the nurse came in the room at 22:27, the patient was unresponsive and was not able to read the card at 22:30.  She arched her back again and had slight shaking and grimacing and continued being unresponsive.  The event continued for approximately 8 minutes.  During some parts of the event, there is significant myogenic and movement artifact; however, between the artifact, there is a normal waking background activities and normal PDR.  No epileptiform discharges or ictal activity is seen during  this event.      IMPRESSION:  Normal video EEG.  No epileptiform discharges physiological slowing were present.  No electrographic seizure was recorded.  The patient had an event witharching back and shaking and unresponsiveness with no abnormal EEG correlate, most consistent with psychogenic nonepileptic spell.  Clinical correlation advised.         LITZY OKEEFE MD             D: 2019   T: 2019   MT: FJ      Name:     SOMMER GARCIA   MRN:      [id number]        Account:        [id number]   :      1990           Procedure Date: 2019      Document: [id number]

## 2019-06-30 ENCOUNTER — PATIENT OUTREACH (OUTPATIENT)
Dept: CARE COORDINATION | Facility: CLINIC | Age: 29
End: 2019-06-30

## 2019-07-01 ENCOUNTER — TELEPHONE (OUTPATIENT)
Dept: ORTHOPEDICS | Facility: CLINIC | Age: 29
End: 2019-07-01
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
## 2019-07-01 NOTE — TELEPHONE ENCOUNTER
Patient was recently seen in the ER.  Recommendation was to follow-up with psychiatry and urology.  Patient is agreeable waiting to schedule hand surgery until she is cleared by these providers.

## 2019-07-02 ENCOUNTER — PRE VISIT (OUTPATIENT)
Dept: UROLOGY | Facility: CLINIC | Age: 29
End: 2019-07-02

## 2019-07-02 ENCOUNTER — ALLIED HEALTH/NURSE VISIT (OUTPATIENT)
Dept: PSYCHIATRY | Facility: CLINIC | Age: 29
End: 2019-07-02
Payer: COMMERCIAL

## 2019-07-02 DIAGNOSIS — F25.0 SCHIZOAFFECTIVE DISORDER, BIPOLAR TYPE (H): Primary | ICD-10-CM

## 2019-07-02 LAB — INTERPRETATION ECG - MUSE: NORMAL

## 2019-07-02 NOTE — TELEPHONE ENCOUNTER
MEDICAL RECORDS REQUEST   [facility]  Urology Clinic  [address] [place] [address]  PHONE: [phone]  Fax: [phone]        FUTURE VISIT INFORMATION                                                   Ayana Prasda, : 1990 scheduled for future visit at [facility]    APPOINTMENT INFORMATION:    Date: 19 10AM     Provider:  Tessa Galvan RN     Reason for Visit/Diagnosis: 1 week hospital follow up from [facility]    REFERRAL INFORMATION:    Referring provider:  Kathie Sandhu     Specialty: MD     Referring providers clinic:  ED F/U appt     Clinic contact number:  [phone]    RECORDS REQUESTED FOR VISIT                                                     NOTES  STATUS/DETAILS   OFFICE NOTE from referring provider  yes   OFFICE NOTE from other specialist  no   DISCHARGE SUMMARY from hospital  yes   DISCHARGE REPORT from the ER  yes   OPERATIVE REPORT  yes   MEDICATION LIST  no   LABS     URINALYSIS (UA)  yes     PRE-VISIT CHECKLIST      Record collection complete Yes - All recs in Epic    Appointment appropriately scheduled           (right time/right provider) Yes   MyChart activation Yes   Questionnaire complete If no, please explain: In process      Completed by: Callie Sanford

## 2019-07-02 NOTE — Clinical Note
Is open to having Sergio be a part of [employer] at a later time (reports Sergio as very stressed right now) Suggested this could be a way to reduce stress, but she wasn't sure.Mentioned she feels as if she may need to go back to the hospital within a week if she doesn't sleep. Has crisis resources. Very shaky and had difficulty focusing. Feels too nervous to meet with Deena so I offered a warm hand off next Friday.
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
## 2019-07-05 ENCOUNTER — OFFICE VISIT (OUTPATIENT)
Dept: UROLOGY | Facility: CLINIC | Age: 29
End: 2019-07-05
Payer: COMMERCIAL

## 2019-07-05 VITALS
DIASTOLIC BLOOD PRESSURE: 87 MMHG | SYSTOLIC BLOOD PRESSURE: 123 MMHG | HEART RATE: 92 BPM | HEIGHT: 67 IN | BODY MASS INDEX: 31.39 KG/M2 | WEIGHT: 200 LBS

## 2019-07-05 DIAGNOSIS — R33.9 URINARY RETENTION: Primary | ICD-10-CM

## 2019-07-05 ASSESSMENT — ENCOUNTER SYMPTOMS
DISTURBANCES IN COORDINATION: 0
LOSS OF CONSCIOUSNESS: 1
CHILLS: 0
HEMATURIA: 0
WEAKNESS: 0
PANIC: 0
DEPRESSION: 0
ALTERED TEMPERATURE REGULATION: 0
TINGLING: 0
NIGHT SWEATS: 0
HEADACHES: 0
POLYPHAGIA: 1
DYSURIA: 0
PARALYSIS: 0
INCREASED ENERGY: 0
DECREASED CONCENTRATION: 1
INSOMNIA: 0
NERVOUS/ANXIOUS: 1
WEIGHT LOSS: 0
SPEECH CHANGE: 0
SEIZURES: 0
TREMORS: 0
DIZZINESS: 1
FLANK PAIN: 1
FEVER: 0
DIFFICULTY URINATING: 1
MEMORY LOSS: 0
HALLUCINATIONS: 1
WEIGHT GAIN: 1
NUMBNESS: 0
POLYDIPSIA: 1
DECREASED APPETITE: 0
FATIGUE: 1

## 2019-07-05 ASSESSMENT — MIFFLIN-ST. JEOR: SCORE: 1669.82

## 2019-07-05 ASSESSMENT — PAIN SCALES - GENERAL: PAINLEVEL: NO PAIN (1)

## 2019-07-05 NOTE — PATIENT INSTRUCTIONS
Patient Education     Discharge Instructions: Self-Catheterization for Women  Your doctor has prescribed self-catheterization for you because you are having trouble urinating naturally. This problem can be caused by injury, disease, infection, recent surgery (especially urinary incontinence or prolapse procedures) hysterectomy, or other conditions.  Many people urinate by self-catheterization (also called intermittent catheterization). Self-catheterization simply means inserting a clean, thin, flexible tube (catheter) into the bladder to empty urine. This helps you empty your bladder when it won t empty by itself or empty all the way. You were shown in the hospital how to do this procedure. The steps below should help you remember how to do it properly.     Lubricate catheter       Insert catheter       Empty urine      Gather your supplies  You will need the following:    Soap and warm water or a moist towelette    Clean catheter    Water-soluble lubricating jelly (not petroleum jelly)    Mirror    Toilet or basin  Get ready    Wash your hands and your genital area. Use warm soapy water. You can also use a moist towelette. As always, wash from front to back.    Lubricate the catheter with the water-soluble lubricating jelly.  ? Lubricate 2 to 4 inches of the catheter tip.  ? Place the other end of the catheter over the toilet or basin.  Empty your bladder    Spread the labia (the lips or folds at the opening of your vagina). Use a mirror or your index finger to find the urethra (urinary tract opening).    Slowly insert the catheter into your urethra. If it doesn t go in, take a deep breath and bear down as if to trying to urinate.    If you feel a sharp pain, remove the catheter and try again.    Empty your bladder.  ? When the urine starts to flow, stop inserting the catheter.  ? When the urine stops flowing, slowly remove the catheter.  Catheter care  If you use a disposable catheter, use a new one each time you  empty your bladder. Throw the catheter away when you re done. If your catheters are reusable, do the following after each use:    Wash your hands with soap and warm water.    Clean the catheter with soap and warm water.    Rinse the catheter, making sure there is no soap left inside or on it.    Dry the outside of the catheter.    Store the catheter in a clean, dry container, such as a re-sealable plastic bag.    Throw away a catheter if the plastic looks cloudy.    Wash your hands again. If you used a basin, wash it out.  Follow-up care  Make a follow-up appointment as directed by our staff.  When to call your healthcare provider  Call your healthcare provider right away if you have any of the following:    Fever of 100.4 F (38.0 C) or higher, or as advised by your healthcare provider    Chills    Burning in the urinary tract or pubic area    Nausea and vomiting    Aching in the lower back    Sediment or mucus in the urine    Cloudy urine    Bloody (pink or red) or foul-smelling urine   Date Last Reviewed: 1/1/2017 2000-2018 [employer]. [address] [place] [address]. All rights reserved. This information is not intended as a substitute for professional medical care. Always follow your healthcare professional's instructions.      Please  Schedule Urodynamic testing and complete your voiding diary.  You will need to bring this to your Urodynamic(WITH XRAY) testing as well.      It was a pleasure meeting with you today.  Thank you for allowing me and my team the privilege of caring for you today.  YOU are the reason we are here, and I truly hope we provided you with the excellent service you deserve.  Please let us know if there is anything else we can do for you so that we can be sure you are leaving completely satisfied with your care experience.        Tonya Arnold LPN

## 2019-07-05 NOTE — NURSING NOTE
"Chief Complaint   Patient presents with     Consult     retention, discuss UDS       Blood pressure 123/87, pulse 92, height 1.702 m (5' 7\"), weight 90.7 kg (200 lb), not currently breastfeeding. Body mass index is 31.32 kg/m .    Patient Active Problem List   Diagnosis     ADHD (attention deficit hyperactivity disorder)     Bipolar 1 disorder, manic, mild     Marijuana abuse     Polysubstance abuse (H)     GERD (gastroesophageal reflux disease)     Tobacco abuse     Abdominal pain, right upper quadrant     Intractable back pain     Optic neuritis     Cauda equina syndrome with neurogenic bladder (H)     Schizoaffective disorder, bipolar type (H)     PTSD (post-traumatic stress disorder)     Anxiety     Auditory hallucinations     Class 2 obesity due to excess calories in adult     Nephrolithiasis     Cyst of left ovary     Borderline personality disorder (H)     Cannabis dependence (H)     Depression     Episodic mood disorder (H)     History of heroin abuse     Moderate episode of recurrent major depressive disorder (H)     Opioid use disorder, severe, dependence (H)     Substance-induced psychotic disorder with hallucinations (H)     History of methamphetamine abuse     Nausea     Overdose     Suicidal ideation     Bella (H)     Spell of altered consciousness     Urinary retention       Allergies   Allergen Reactions     Haldol [Haloperidol] Other (See Comments)     Makes patient very angry and anxious     Seroquel [Quetiapine] Palpitations     Spent 2 weeks in the hospital due to having seroquel, caused palpitations and QT prolongation     Adhesive Tape Hives     Percocet [Oxycodone-Acetaminophen] Nausea and Vomiting     Prednisone Other (See Comments) and Hives     Suicidal ideation     Risperidone Other (See Comments)     Tramadol Hcl Nausea and Vomiting     Droperidol Anxiety       Current Outpatient Medications   Medication Sig Dispense Refill     Amino Acids (AMINO ACID PO) Take 1 capsule by mouth daily " Unknown formulation and dose.       eszopiclone (LUNESTA) 2 MG tablet Take 1 tablet (2 mg) by mouth nightly as needed for sleep 30 tablet 0     fluPHENAZine (PROLIXIN) 1 MG tablet Take 1 tablet (1 mg) by mouth 2 times daily 60 tablet 1     fluPHENAZine decanoate (PROLIXIN) 25 MG/ML injection Inject 0.1 mLs (2.5 mg) into the muscle every 21 days 5 mL 0     gabapentin (NEURONTIN) 300 MG capsule Take 2 capsules (600 mg) by mouth 3 times daily 180 capsule 1     lithium ER (LITHOBID) 300 MG CR tablet Take 300 mg by mouth every morning In addition to bedtime dose.       lithium ER (LITHOBID) 300 MG CR tablet Take 2 tablets (600 mg) by mouth At Bedtime 60 tablet 1     LORazepam (ATIVAN) 1 MG tablet Take 1 tablet (1 mg) by mouth 2 times daily as needed for anxiety 60 tablet 0     nicotine (NICODERM CQ) 21 MG/24HR 24 hr patch Place 1 patch onto the skin daily 30 patch 1     nicotine polacrilex (NICORETTE) 4 MG gum Place 1 each (4 mg) inside cheek every hour as needed for other (nicotine withdrawal symptoms) 100 tablet 1     OLANZapine (ZYPREXA) 10 MG tablet Take 1 tablet (10 mg) by mouth 2 times daily as needed (Agitation) 60 tablet 1     ondansetron (ZOFRAN) 4 MG tablet Take 1 tablet (4 mg) by mouth every 8 hours as needed for nausea 30 tablet 1     PREBIOTIC PRODUCT PO Take 1 capsule by mouth daily Unknown formulation and dose.       traZODone (DESYREL) 50 MG tablet Take 1 tablet (50 mg) by mouth nightly as needed for sleep 30 tablet 1       Social History     Tobacco Use     Smoking status: Former Smoker     Packs/day: 0.50     Years: 5.00     Pack years: 2.50     Types: Other     Start date: 8/1/2011     Smokeless tobacco: Current User     Types: Chew   Substance Use Topics     Alcohol use: No     Alcohol/week: 0.0 oz     Drug use: Not Currently     Comment: past use marijuana, heroin and cocaine     The following medication was given:     MEDICATION: Cipro  ROUTE: PO  SITE: mouth  DOSE: 500mg  LOT #:  [id number]  : [employer]  EXPIRATION DATE: 10-20  NDC#: 5772-65418-765-112   Was there drug waste? No  180 MEDICAL    Once completed please fax to [phone]  E-script to [email or web address]    A. Please include patient demographics and chart notes (ex: indefinite retention) with this order     Name:Ayana Prasad    : 1990  Phone: [phone] [place] [address]    B. Diagnosis    X Retention of urine (788.20/R33.9)   Urinary Incontinence (788.30/R30)    Incomplete Bladder Emptying (788.21/R39-14)   Urge Incontinence (788.31/N39.41)    Other Specified Retention of Urine (788.29/R33.8)   Other:     C. Order Information/Start Date: 2019    Number of Refills: 12 Length of Need:lifetime  X Patient may receive up to a 90-day supply at one time; therefore, quantity will be three (3) times amount below.    Type (check one): X Hydrophilic    Silicone    Red Rubber    PVC Clear                                      CHECK PRODUCT [place] FREQ OF USE QUANTITY PER STRAIGHT  COUDE    ONE  SIZE PER DAY MONTH TO DISPESE     X Intermittent Catheter 14 FR 6 186      Intermittent Catheter with         Insertion Supplies          Condom or External Catheters          ADDITIONAL PRODUCTS/ITEMS ORDERED (check all that apply)     PRODUCT FREQ OF USE QUANTITY PER  ___ Patient Choice     PER MONTH MONTH TO DISPENSE  ___ Bard   X Tube of Lubricant  186 1TUBE  ___ Coloplast    Leg Bags    ___ Cure Medical    Night Bags    ___ GentleCath    Penile Clamp (Cunningham)    ___ Hi-Slip    Disinfection/BZK-PDI Wipes    ___ Jamee    Styles Insertion Tray    ___Lo Fric    Vacuum Erection Device    ___Magic3    Styles Catheters:    ___ MTG    Straight    ___ Shubham-Teleflex    Coude    ___ SpediCath    E[place]ian size        Balloon size         D. Physician's Information:      Physician's Name: Tessa Galvan CNP  Phone: [phone]  Date: 19    [facility]  [facility]  [facility]  [address] [place] [address] [id number]  [place], [address]    Lindsey Lee   Office: [phone]  Mobile: [phone]  Fax: [phone]  [email or web address]       Prior to administration, verified patient identity using patient's name and date of birth.  Drug Amount Wasted:  None.  Vial/Syringe: Single dose vial       With patient supine on exam table  Her existing Styles (16 Fr) balloon was deflated of 10 ml's sterile water and gently removed. Patient tolerated procedure well.      Patient was taught CIC which she will perform 6 x d per Tessa. She was able to perform this without difficulty, she was also given instructions on CIC. She stated her understanding and had no questions.     14 fr catheters, K-y were ordered for her today.     Tonya Arnold LPN  7/5/2019  10:49 AM

## 2019-07-05 NOTE — NURSING NOTE
"Chief Complaint   Patient presents with     Consult     retention, discuss UDS       Blood pressure 123/87, pulse 92, height 1.702 m (5' 7\"), weight 90.7 kg (200 lb), not currently breastfeeding. Body mass index is 31.32 kg/m .    Patient Active Problem List   Diagnosis     ADHD (attention deficit hyperactivity disorder)     Bipolar 1 disorder, manic, mild     Marijuana abuse     Polysubstance abuse (H)     GERD (gastroesophageal reflux disease)     Tobacco abuse     Abdominal pain, right upper quadrant     Intractable back pain     Optic neuritis     Cauda equina syndrome with neurogenic bladder (H)     Schizoaffective disorder, bipolar type (H)     PTSD (post-traumatic stress disorder)     Anxiety     Auditory hallucinations     Class 2 obesity due to excess calories in adult     Nephrolithiasis     Cyst of left ovary     Borderline personality disorder (H)     Cannabis dependence (H)     Depression     Episodic mood disorder (H)     History of heroin abuse     Moderate episode of recurrent major depressive disorder (H)     Opioid use disorder, severe, dependence (H)     Substance-induced psychotic disorder with hallucinations (H)     History of methamphetamine abuse     Nausea     Overdose     Suicidal ideation     Bella (H)     Spell of altered consciousness     Urinary retention       Allergies   Allergen Reactions     Haldol [Haloperidol] Other (See Comments)     Makes patient very angry and anxious     Seroquel [Quetiapine] Palpitations     Spent 2 weeks in the hospital due to having seroquel, caused palpitations and QT prolongation     Adhesive Tape Hives     Percocet [Oxycodone-Acetaminophen] Nausea and Vomiting     Prednisone Other (See Comments) and Hives     Suicidal ideation     Risperidone Other (See Comments)     Tramadol Hcl Nausea and Vomiting     Droperidol Anxiety       Current Outpatient Medications   Medication Sig Dispense Refill     Amino Acids (AMINO ACID PO) Take 1 capsule by mouth daily " Unknown formulation and dose.       eszopiclone (LUNESTA) 2 MG tablet Take 1 tablet (2 mg) by mouth nightly as needed for sleep 30 tablet 0     fluPHENAZine (PROLIXIN) 1 MG tablet Take 1 tablet (1 mg) by mouth 2 times daily 60 tablet 1     fluPHENAZine decanoate (PROLIXIN) 25 MG/ML injection Inject 0.1 mLs (2.5 mg) into the muscle every 21 days 5 mL 0     gabapentin (NEURONTIN) 300 MG capsule Take 2 capsules (600 mg) by mouth 3 times daily 180 capsule 1     lithium ER (LITHOBID) 300 MG CR tablet Take 300 mg by mouth every morning In addition to bedtime dose.       lithium ER (LITHOBID) 300 MG CR tablet Take 2 tablets (600 mg) by mouth At Bedtime 60 tablet 1     LORazepam (ATIVAN) 1 MG tablet Take 1 tablet (1 mg) by mouth 2 times daily as needed for anxiety 60 tablet 0     nicotine (NICODERM CQ) 21 MG/24HR 24 hr patch Place 1 patch onto the skin daily 30 patch 1     nicotine polacrilex (NICORETTE) 4 MG gum Place 1 each (4 mg) inside cheek every hour as needed for other (nicotine withdrawal symptoms) 100 tablet 1     OLANZapine (ZYPREXA) 10 MG tablet Take 1 tablet (10 mg) by mouth 2 times daily as needed (Agitation) 60 tablet 1     ondansetron (ZOFRAN) 4 MG tablet Take 1 tablet (4 mg) by mouth every 8 hours as needed for nausea 30 tablet 1     PREBIOTIC PRODUCT PO Take 1 capsule by mouth daily Unknown formulation and dose.       traZODone (DESYREL) 50 MG tablet Take 1 tablet (50 mg) by mouth nightly as needed for sleep 30 tablet 1       Social History     Tobacco Use     Smoking status: Former Smoker     Packs/day: 0.50     Years: 5.00     Pack years: 2.50     Types: Other     Start date: 8/1/2011     Smokeless tobacco: Current User     Types: Chew   Substance Use Topics     Alcohol use: No     Alcohol/week: 0.0 oz     Drug use: Not Currently     Comment: past use marijuana, heroin and cocaine       Narcisa Ibarra LPN  7/5/2019  10:07 AM
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
## 2019-07-08 ENCOUNTER — TELEPHONE (OUTPATIENT)
Dept: PSYCHIATRY | Facility: CLINIC | Age: 29
End: 2019-07-08

## 2019-07-08 DIAGNOSIS — F25.0 SCHIZOAFFECTIVE DISORDER, BIPOLAR TYPE (H): Primary | ICD-10-CM

## 2019-07-08 NOTE — TELEPHONE ENCOUNTER
NAVIGATE SEE Outgoing Telephone Call  For Supported Employment & Education    NAVIGATE Enrollee: Ayana Prasad (1990)     MRN: [id number]  Date of Call: 7/8/2019  Contacted: Ayana    Discussed:   After consultation with the NAVIGATE team RE: Ayana's reported lack of sleep for 3 days and physical concerns, this writer called Ayana to check in on her and see if she were interested in being assessed at the ED if she reported a continued lack of sleep. Ayana reported to this writer that she felt much better after the holiday weekend, restarted her trazodone and has been getting good sleep. She also reported she got her catheter out last week and was planning on swimming today. This writer also coordinated an appt with Dr. Hamilton for Monday 7/15 to review medications.     Linh Hamilton

## 2019-07-09 RX ORDER — FLUPHENAZINE DECANOATE 25 MG/ML
2.5 INJECTION, SOLUTION INTRAMUSCULAR; SUBCUTANEOUS
Qty: 5 ML | Refills: 0 | Status: SHIPPED | OUTPATIENT
Start: 2019-07-09 | End: 2019-10-09

## 2019-07-10 ENCOUNTER — PRE VISIT (OUTPATIENT)
Dept: UROLOGY | Facility: CLINIC | Age: 29
End: 2019-07-10

## 2019-07-10 DIAGNOSIS — R33.9 URINARY RETENTION: Primary | ICD-10-CM

## 2019-07-10 NOTE — TELEPHONE ENCOUNTER
Pre-visit completed. Patient coming in for UDS for a history of urinary retention at the request of Tessa Galvan CNP. Negative UA on 6/28/19 when she had Styles catheter in place. She subsequently had Styles catheter removed in clinic on 7/5 and has started CIC. To ensure no active infection at the time of UDS procedure, would recommend UA/UC 7-10 days prior. Orders placed. Will ask nursing staff to notify patient. Await results and treat if needed.    lAice Mccall PA-C  Department of Urology

## 2019-07-10 NOTE — PROGRESS NOTES
Per Alice Mccall PA-C, patient will need a UA/UC this week to rule out UTI prior to Urodynamics testing next week.  Orders placed- patient notified.  She will try to do today/tomorrow at [facility].  Will watch for results.    ELENI Cardona

## 2019-07-11 ENCOUNTER — VIRTUAL VISIT (OUTPATIENT)
Dept: PSYCHIATRY | Facility: CLINIC | Age: 29
End: 2019-07-11
Payer: COMMERCIAL

## 2019-07-11 DIAGNOSIS — F25.0 SCHIZOAFFECTIVE DISORDER, BIPOLAR TYPE (H): Primary | ICD-10-CM

## 2019-07-12 ENCOUNTER — OFFICE VISIT (OUTPATIENT)
Dept: PSYCHIATRY | Facility: CLINIC | Age: 29
End: 2019-07-12
Payer: COMMERCIAL

## 2019-07-12 DIAGNOSIS — F25.0 SCHIZOAFFECTIVE DISORDER, BIPOLAR TYPE (H): Primary | ICD-10-CM

## 2019-07-12 ASSESSMENT — ANXIETY QUESTIONNAIRES
3. WORRYING TOO MUCH ABOUT DIFFERENT THINGS: NEARLY EVERY DAY
GAD7 TOTAL SCORE: 21
5. BEING SO RESTLESS THAT IT IS HARD TO SIT STILL: NEARLY EVERY DAY
1. FEELING NERVOUS, ANXIOUS, OR ON EDGE: NEARLY EVERY DAY
6. BECOMING EASILY ANNOYED OR IRRITABLE: NEARLY EVERY DAY
7. FEELING AFRAID AS IF SOMETHING AWFUL MIGHT HAPPEN: NEARLY EVERY DAY
4. TROUBLE RELAXING: NEARLY EVERY DAY
2. NOT BEING ABLE TO STOP OR CONTROL WORRYING: NEARLY EVERY DAY

## 2019-07-12 ASSESSMENT — PATIENT HEALTH QUESTIONNAIRE - PHQ9: SUM OF ALL RESPONSES TO PHQ QUESTIONS 1-9: 0

## 2019-07-12 NOTE — PROGRESS NOTES
NAVIGATE SEE Incoming Telephone Call  For Supported Employment & Education    NAVIGATE Enrollee: Ayana Prasad (1990)     MRN: [id number]  Incoming Call Received on: 7/11/19  Call Received from: Ayana Sebastian's manager    SEE's response to incoming call:     Writer received call from Ayana Sebastian's manager expressing some concern about safety in the workplace. She reports everything has been going well at the job, but that it seems to be taking Ayana a long time to complete her assignments and that she seems to also have memory problems (Jaz reports needing to remind her 6-10 times basic skills/how to check accuracy, etc.). 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 Jaz was open to this and offered next Thursday from 11-2 or Wednesday 10-2. Writer will f/u with ayana regarding a visit.    Linh Hamilton

## 2019-07-13 ASSESSMENT — ANXIETY QUESTIONNAIRES: GAD7 TOTAL SCORE: 21

## 2019-07-15 NOTE — PROGRESS NOTES
J[facility]IAN Clinician Contact & Progress Note  For Individual Resiliency Training (IRT)  A Part of the [facility] First Episode of Psychosis Program    NAVIGATE Enrollee: Ayana Prasad (1990)     MRN: [id number]  Date:  7/12/19  Diagnosis: Schizoaffective disorder, bipolar type (F25.0)  Clinician: JILLIAN Individual Resiliency Trainer, JULISA Ring     1. Type of contact: (majority of time spent)  IRT Session    2. People present:   Writer  Client: Ayana Prasad    3. Length of Actual Contact: Start Time: 9:00; End Time: 10:00   Traveled?    No     4. Location of contact:  [facility] [place]    5. Did the client complete the home practice option(s) from the previous session: Not Applicable    6. Motivational Teaching Strategies:  Connect info and skills with personal goals  Promote hope and positive expectations  Explore pros and cons of change  Re-frame experiences in positive light    7. Educational Teaching Strategies:  Review of written material/education  Relate information to client's experience  Break down information into small chunks    8. CBT Teaching Strategies:  Reinforcement and shaping (positive feedback for steps towards goals)    9. IRT Module(s) Addressed:  Module 1 - Orientation    10. Techniques utilized:   [place] announced at beginning of session  Present new material  Summarize progress made in current session    11. Mental Status Exam:    Alertness: alert  and oriented  Appearance: casually groomed  Behavior/Demeanor: guarded, with fair  eye contact   Speech: normal and regular rate and rhythm  Language: intact. Preferred language identified as English.  Psychomotor: restless and fidgety  Mood: anxious  Affect: guarded; was congruent to mood; was congruent to content  Thought Process/Associations: unremarkable  Thought Content:  Reports preoccupations;  Denies suicidal and violent ideation  Perception:  Reports auditory hallucinations;  Denies none  Insight: fair  Judgment:  "fair  Cognition: does  appear grossly intact; formal cognitive testing was not done  Suicidal ideation: denies SI, denies intent,  and denies plan  Homicidal Ideation: denies    12. Assessment/Progress Note:     This writer met with Ayana for an orientation to IRT services. Ayana completed the IMR Self-Rating Form, CANSAS, and [place] Symptom Index. This writer provided and introduction to typical topics discussed in IRT; Ayana seemed agreeable with engagement. She appeared to be anxious/guarded and stated she utilized coping mechanisms prior to the session; this writer praised her for doing so. She reported the following symptoms: auditory hallucinations and anxiety. She mentioned her main goal is to stay out of the hospital. She is currently getting a Master's and would like to work for the [employer]. She is currently working 20 hours per week and says it is going well. She described command hallucinations occurring last April, but denied currently. She noted her auditory hallucinations are neutral commentary. She identified psychosocial stressors including: recent move with 9 people in the home, adapting to having a job. She noted she has used marijuana socially in the past, but denied at this time. She denied any suicidal, violent, or self-harm thoughts.     13. Plan/Referrals:     This writer will continue to build rapport and establish initial IRT goals.    This writer will continue to consult with the team.    Billing for \"Interactive Complexity\"?    No      JULISA Ring    NAVIGATE Individual Resiliency Trainer    Attestation:    I did not see this patient directly. This patient is discussed with me in individual clinical social work supervision, and I agree with the plan as documented.     DESIRE Renteria, JULISA, August 15, 2019  "

## 2019-07-17 ENCOUNTER — TELEPHONE (OUTPATIENT)
Dept: PSYCHIATRY | Facility: CLINIC | Age: 29
End: 2019-07-17

## 2019-07-17 NOTE — TELEPHONE ENCOUNTER
NAVIGATE SEE Incoming Telephone Call  For Supported Employment & Education    NAVIGATE Enrollee: Ayana Prasad (1990)     MRN: [id number]  Incoming Call Received on: 7/17/19  Call Received from: Ayana Sebastian's manager    SEE's response to incoming call:   Writer received VM from Jaz requesting that I visit Ayana at work tomorrow (thur) when she is working. Jaz reports that Ayana has called in sick about once a week (reports vomiting) and is concerned that she can't keep her on when she is missing so much work. 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      Writer reached out to Ayana via text asking if it were okay that I visit her at work.     Ayana said this was fine so this writer called back Jaz and lesly that I will be there tomorrow around 11:30am for Supported Employment .    Linh Hamilton

## 2019-07-18 ENCOUNTER — MYC MEDICAL ADVICE (OUTPATIENT)
Dept: FAMILY MEDICINE | Facility: CLINIC | Age: 29
End: 2019-07-18

## 2019-07-18 ENCOUNTER — ALLIED HEALTH/NURSE VISIT (OUTPATIENT)
Dept: PSYCHIATRY | Facility: CLINIC | Age: 29
End: 2019-07-18
Payer: COMMERCIAL

## 2019-07-18 DIAGNOSIS — F25.0 SCHIZOAFFECTIVE DISORDER, BIPOLAR TYPE (H): Primary | ICD-10-CM

## 2019-07-19 DIAGNOSIS — Z72.0 TOBACCO ABUSE: ICD-10-CM

## 2019-07-19 DIAGNOSIS — F31.11 BIPOLAR 1 DISORDER, MANIC, MILD (H): ICD-10-CM

## 2019-07-19 DIAGNOSIS — F25.0 SCHIZOAFFECTIVE DISORDER, BIPOLAR TYPE (H): ICD-10-CM

## 2019-07-19 RX ORDER — TRAZODONE HYDROCHLORIDE 50 MG/1
50 TABLET, FILM COATED ORAL
Qty: 14 TABLET | Refills: 0 | Status: SHIPPED | OUTPATIENT
Start: 2019-07-19 | End: 2019-08-14

## 2019-07-19 RX ORDER — FLUPHENAZINE HYDROCHLORIDE 1 MG/1
1 TABLET ORAL 2 TIMES DAILY
Qty: 30 TABLET | Refills: 0 | Status: SHIPPED | OUTPATIENT
Start: 2019-07-19 | End: 2019-07-22

## 2019-07-19 RX ORDER — GABAPENTIN 300 MG/1
600 CAPSULE ORAL 3 TIMES DAILY
Qty: 90 CAPSULE | Refills: 0 | Status: SHIPPED | OUTPATIENT
Start: 2019-07-19 | End: 2019-08-14

## 2019-07-19 RX ORDER — OLANZAPINE 10 MG/1
10 TABLET ORAL 2 TIMES DAILY PRN
Qty: 30 TABLET | Refills: 0 | Status: SHIPPED | OUTPATIENT
Start: 2019-07-19 | End: 2019-07-22

## 2019-07-19 RX ORDER — LORAZEPAM 1 MG/1
1 TABLET ORAL 2 TIMES DAILY PRN
Qty: 30 TABLET | Refills: 0 | Status: SHIPPED | OUTPATIENT
Start: 2019-07-19 | End: 2019-08-07

## 2019-07-19 RX ORDER — ESZOPICLONE 2 MG/1
2 TABLET, FILM COATED ORAL
Qty: 14 TABLET | Refills: 0 | Status: SHIPPED | OUTPATIENT
Start: 2019-07-19 | End: 2019-08-07

## 2019-07-19 RX ORDER — LITHIUM CARBONATE 300 MG/1
600 TABLET, FILM COATED, EXTENDED RELEASE ORAL AT BEDTIME
Qty: 30 TABLET | Refills: 0 | Status: SHIPPED | OUTPATIENT
Start: 2019-07-19 | End: 2019-07-22

## 2019-07-19 RX ORDER — LITHIUM CARBONATE 300 MG/1
300 TABLET, FILM COATED, EXTENDED RELEASE ORAL EVERY MORNING
Qty: 14 TABLET | Refills: 0 | Status: SHIPPED | OUTPATIENT
Start: 2019-07-19 | End: 2019-07-22

## 2019-07-19 NOTE — TELEPHONE ENCOUNTER
Scripts walked over to the [facility].    Sherley Godfrey, [facility]

## 2019-07-22 ENCOUNTER — OFFICE VISIT (OUTPATIENT)
Dept: PSYCHIATRY | Facility: CLINIC | Age: 29
End: 2019-07-22
Payer: COMMERCIAL

## 2019-07-22 ENCOUNTER — TELEPHONE (OUTPATIENT)
Dept: FAMILY MEDICINE | Facility: CLINIC | Age: 29
End: 2019-07-22

## 2019-07-22 VITALS
WEIGHT: 201.4 LBS | SYSTOLIC BLOOD PRESSURE: 118 MMHG | DIASTOLIC BLOOD PRESSURE: 78 MMHG | HEIGHT: 67 IN | BODY MASS INDEX: 31.61 KG/M2 | HEART RATE: 98 BPM

## 2019-07-22 DIAGNOSIS — F25.0 SCHIZOAFFECTIVE DISORDER, BIPOLAR TYPE (H): ICD-10-CM

## 2019-07-22 DIAGNOSIS — F31.11 BIPOLAR 1 DISORDER, MANIC, MILD (H): ICD-10-CM

## 2019-07-22 RX ORDER — OLANZAPINE 10 MG/1
10 TABLET ORAL DAILY PRN
Qty: 30 TABLET | Refills: 0 | Status: SHIPPED | OUTPATIENT
Start: 2019-07-22 | End: 2019-07-31

## 2019-07-22 RX ORDER — LITHIUM CARBONATE 300 MG/1
600 TABLET, FILM COATED, EXTENDED RELEASE ORAL AT BEDTIME
Qty: 60 TABLET | Refills: 0 | Status: SHIPPED | OUTPATIENT
Start: 2019-07-22 | End: 2019-07-29

## 2019-07-22 RX ORDER — LITHIUM CARBONATE 300 MG/1
300 TABLET, FILM COATED, EXTENDED RELEASE ORAL EVERY MORNING
Qty: 30 TABLET | Refills: 0 | Status: SHIPPED | OUTPATIENT
Start: 2019-07-22 | End: 2019-07-29

## 2019-07-22 RX ORDER — FLUPHENAZINE HYDROCHLORIDE 1 MG/1
1 TABLET ORAL 2 TIMES DAILY
Qty: 60 TABLET | Refills: 0 | Status: SHIPPED | OUTPATIENT
Start: 2019-07-22 | End: 2019-07-31

## 2019-07-22 ASSESSMENT — ANXIETY QUESTIONNAIRES
5. BEING SO RESTLESS THAT IT IS HARD TO SIT STILL: NEARLY EVERY DAY
4. TROUBLE RELAXING: NEARLY EVERY DAY
GAD7 TOTAL SCORE: 14
1. FEELING NERVOUS, ANXIOUS, OR ON EDGE: MORE THAN HALF THE DAYS
3. WORRYING TOO MUCH ABOUT DIFFERENT THINGS: SEVERAL DAYS
7. FEELING AFRAID AS IF SOMETHING AWFUL MIGHT HAPPEN: NOT AT ALL
2. NOT BEING ABLE TO STOP OR CONTROL WORRYING: MORE THAN HALF THE DAYS
6. BECOMING EASILY ANNOYED OR IRRITABLE: NEARLY EVERY DAY

## 2019-07-22 ASSESSMENT — MIFFLIN-ST. JEOR: SCORE: 1676.17

## 2019-07-22 ASSESSMENT — PAIN SCALES - GENERAL: PAINLEVEL: NO PAIN (0)

## 2019-07-22 ASSESSMENT — PATIENT HEALTH QUESTIONNAIRE - PHQ9: SUM OF ALL RESPONSES TO PHQ QUESTIONS 1-9: 5

## 2019-07-22 NOTE — PROGRESS NOTES
"NAVIGATE Medication Management Progress Note  A Part of the [facility] First Episode of Psychosis Program    NAVIGATE Enrollee: Ayana Prasad (1990)     MRN: [id number]  Date:  7/22/19         Contributors to the Assessment     Chart Reviewed.   Interview completed with Ayana Prasad.  Collateral information obtained from  Review of the chart and Navigate team members.         Chief Complaint     \"Anxiety\" per patient         Interim History      Ayana Prasad is a 29 year old female who was last seen in clinic on 6/17/19 at which time her guanfacine was stopped secondary to c/o dryness of mouth . Recent EKG was read as WNL. Labs were ordered. Prolixin PO was continued with plan to switch to IM in future.  The patient reports good treatment adherence.  History was provided by Ms. Prasad  who was a fair historian.  Since the last visit:    Patient has reported ongoing absence episodes or shakes  With an accompanied fall. Seizures were suspected, patient was hospitalized, reportedly seizure disorder was r/o with/ Overnight  EEG monitoring. 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 Linh ODONNELL her SEE provider visited with the  and educated her about seizure protocol and also advocated for closer  Clinic f/u for [facility]. Work is concerned about her multiple absences.   Of note patient reported she is angry with her self for not being able to focus at work and for having memory problems. She has previously been advised to use sticky notes.   Today patient reported she was looking at a [employer] camera to buy as a b'day present for herself. She notes she is a good , wants to do some landscapes. Reports sober since 2015. Lives with wife Sergio and three children aged 15,12,5. Reports she has been attending school 35 hrs/wk with 25 hrs /wk at work until two weeks ago. Some conflicts noted " around relationship situation with wife Sergio for Ariana's family.  Denies any safety concerns but notes life has been generally boring, she is concerned that her ADHD may have something to do with her current predicament. Her guanfacine was recently DC'ed by Dr. Flores.    PSYCH ROS:  Clinician Rating Form in COMPASS  1. Depressed Mood: Rating2  0 Not reported    1 Very mild occasionally feels sad or  down ; of questionable clinical significance   2 Mild occasionally feels moderately depressed or often feels sad or  down    3 Moderate occasionally feels very depressed or often feels moderately depressed   4 Moderately severe often feels very depressed   5 Severe feels very depressed most of the time   6 Very severe constant extremely painful feelings of depression   U Unable to assess      2. Anxiety/Worry: Ratin  0 Not reported    1 Very mild occasionally feels a little anxious; of questionable clinical significance   2 Mild occasionally feels moderately anxious or often feels a little anxious or worried   3 Moderate occasionally feels very anxious or often feels moderately anxious   4 Moderately severe often feels very anxious or often feels moderately anxious   5 Severe feels very anxious or worried most of the time   6 Very severe patient is continually preoccupied with severe anxiety   U Unable to assess      3. Suicidal Ideation/Behavior: Ratin  0 Not reported    1 Very mild occasional thoughts of dying,  I d be better off dead  or  I wish I were dead    2 Mild frequents thoughts of dying or occasional thoughts of killing self, without plan or method   3 Moderate often thinks of suicide or has though of a specific method   4 Moderately severe has mentally rehearsed a specific method of suicide or has made a suicide attempt with questionable intent to die (e.r. takes aspirins and then tells family)   5 Severe has made preparations for a potentially lethal suicide attempt (e.g acquires a gun and  bullets for an attempt)   6 Very severe has made a suicide attempt with an intent to die   U Unable to assess       4. Elevated/Expansive Mood: Ratin  0 Not at all    1 Very mild questionable; more cheerful than most people in his/her circumstances but of only possible clinical significance   2 Mild brief elevated/expansive mood but only somewhat out of proportion to the circumstances   3 Moderate brief/occasional elevation of mood which is clearly out of proportion to the circumstances   4 Moderately severe sustained/frequent elevation of mood which is clearly out of proportion to the circumstances   5 Severe mood is euphoric most of the time   6 Very severe sustained elevation;  everything is wonderful  almost all of the time   U Unable to assess      5. Hostility/Anger/Irritability/Aggressiveness: Ratin  0 Not at all    1 Very mild occasional irritability of doubtful clinical significance   2 Mild occasionally feels angry or mild or indirect expressions of anger, e.g. sarcasm, disrespect or hostile gestures   3 Moderate frequently feels angry, frequent irritability or occasional direct expression of anger, e.g. yelling at others   4 Moderately severe often feels very angry, often yells at others or occasionally threatens to harm others   5 Severe has acted on his anger by becoming physically abusive on one or two occasions or makes frequent threats to harm others or is very angry most of the time   6 Very severe has been physically aggressive and/or required intervention to prevent assaultiveness on several occasions; or any serious assaultive act   U Unable to assess      6. Impulsive Behavior: Ratin  0 Not at all    1 Very mild one instance of impulsive behavior which is of doubtful clinical significance   2 Mild occasional impulsive acts, e.g. making phone calls at odd hours   3 Moderate occasional impulsive acts with some potential negative consequence, e.g. leaving work abruptly; changing plans  without thinking   4 Moderately severe impulsive acts with definite negative consequences, e.g. overspending on non-essentials; repeated reckless sexual behavior   5 Severe impulsive acts with direct negative consequences, e.g. spends entire income on nonessentials without regard for basic needs   6 Very severe impulsive behavior which is potentially life threatening, e.g. jumps from dangerous height (without suicidal intent) or criminal behavior, e.g. impulsive robbery   U Unable to assess      7. Suspiciousness: Ratin  0 Not present    1 Very mild Seems on guard. Reluctant to respond to some  personal  questions. Reports being overly self-conscious in public   2 Mild Describes incidents in which others have harmed or wanted to harm him/her that sound plausible. Patient feels as if others are watching, laughing, or criticizing him/her in public, but this occurs only occasionally or rarely. Little or no preoccupation   3 Moderate Says others are talking about him/her maliciously, have negative intentions, or may harm him/her. Beyond the likelihood of plausibility, but not delusional. Incidents of suspected persecution occur occasionally (less than once per week) with some preoccupation   4 Moderately severe Same as 3, but incidents occur frequently such as more than once a week. Patient is moderately preoccupied with ideas of persecution OR patient reports persecutory delusions expressed with much doubt (e.g. partial delusion)   5 Severe Delusional -- speaks of Mafia plots, the FBI, or others poisoning his/her food, persecution by supernatural forces   6 Extremely severe Same as 5, but the beliefs are bizarre or more preoccupying. Patient tends to disclose or act on persecutory delusions.   U Unable to assess      8. Unusual Thought Content: Ratin  0 Not present    1 Very mild Ideas of reference (people may stare or may laugh at him), ideas of persecution (people may mistreat him). Unusual beliefs in psychic  hughes, spirits, UFOs, or unrealistic beliefs in one's own abilities. Not strongly held. Some doubt   2 Mild Same as 1, but degree of reality distortion is more severe as indicated by highly unusual ideas or greater conviction. Content may be typical of delusions (even bizarre), but without full conviction. The delusion does not seem to have fully formed, but is considered as one possible explanation for an unusual experience   3 Moderate Delusion present but no preoccupation or functional impairment. May be an encapsulated delusion or a firmly endorsed absurd belief about past delusional circumstances   4 Moderately severe Full delusion(s) present with some preoccupation OR some areas of functioning disrupted by delusional thinking   5 Severe Full delusion(s) present with much preoccupation OR many areas of functioning are disrupted by delusional thinking   6 Extremely severe Full delusions present with almost   U Unable to assess      9. Hallucinations: Ratin  0 Not present    1 Very mild While resting or going to sleep, sees visions, smells odors, or hears voices, sounds or whispers in the absence of external stimulation, but no impairment in functioning   2 Mild While in a clear state of consciousness, hears a voice calling the subject s name, experiences non-verbal auditory hallucinations (e.g., sounds or whispers), formless visual hallucinations, or has sensory experiences in the presence of a modality-relevant stimulus (e.g., visual illusions) infrequently (e.g., 1-2 times per week) and with no functional impairment   3 Moderate Occasional verbal, visual, gustatory, olfactory, or tactile hallucinations with no functional impairment OR non-verbal auditory hallucinations/visual illusions more than infrequently or with impairment   4 Moderately severe Experiences daily hallucinations OR some areas of functioning are disrupted by hallucinations   5 Severe Experiences verbal or visual hallucinations several  times a day OR many areas of functioning are disrupted by these hallucinations   6 Extremely severe Persistent verbal or visual hallucinations throughout the day OR most areas of functioning are disrupted by these hallucinations   U Unable to assess      10. Conceptual Disorganization: Ratin  0 Not present    1 Very mild Peculiar use of words or rambling but speech is comprehensible   2 Mild Speech a bit hard to understand or make sense of due to tangentiality, circumstantiality, or sudden topic shifts   3 Moderate Speech difficult to understand due to tangentiality, circumstantiality, idiosyncratic speech, or topic shifts on many occasions OR 1-2 instances of incoherent phrases   4 Moderately severe Speech difficult to understand due to circumstantiality, tangentiality, neologisms, blocking, or topic shifts most of the time OR 3-5 instances of incoherent phrases   5 Severe Speech is incomprehensible due to severe impairments most of the time. Many PSRS items cannot be rated by self-report alone   6 Extremely severe Speech is incomprehensible throughout interview   U Unable to assess      11. Avolition/Apathy: Ratin  0 Not at all    1 Very mild questionable decrease in time spent in goal-directed activities   2 Mild spends less time in goal-directed activities than is appropriate for situation and age   3 Moderate initiates activities at times but does not follow through   4 Moderately severe rarely initiates activity but will passively engage with encouragement   5 Severe almost never initiates activities; requires assistance to accomplish basic activities   6 Very severe does not initiate or persist in any goal-directed activity even with outside assistance   U Unable to assess      12. Asociality/Low Social Drive: Ratin  0 Not at all    1 Very mild questionable   2 Mild slow to initiate social interactions but usually responds to overtures by others   3 Moderate rarely initiates social interactions;  sometimes responds to overtures by others   4 Moderately severe does not initiate but sometimes responds to overtures by others; little social interaction outside close family members   5 Severe never initiates and rarely encourages conversations or activities; avoids being with others unless prodded, may have contacts with family   6 Very severe avoids being with others (even family members) whenever possible, extreme social isolation   U Unable to assess      13. Adherence: Days: 0  The longest, continuous amount of time in days, since the last visit when the subject did not take medication     14. EPS Part I: Ratin  Rate Elbow Rigidity for all subjects  0 Normal   1 Slight stiffness and resistance   2 Moderate stiffness and resistance   3 Marked rigidity with difficulty in passive movement   4 Extreme stiffness and rigidity with almost a frozen joint   U Unable to assess           EPS Part 2: Signs of EPS: 0  Are there are other signs of EPS (eg diminished arm swing, postural instability, cogwheeling, tremor, akinesia) present based upon patient report or exam?0  0 No   1 Yes     15. Akathesia: Ratin  0 No restlessness reported or observed   1 Mild restlessness observed; e.g., occasional jiggling of the foot occurs when subject is seated   2 Moderate restlessness observed; e.g., on several occasions, jiggles foot, crosses and uncrosses legs or twists a part of the body   3 Restlessness is frequently observed; e.g., the foot or legs moving most of the time   4 Restlessness persistently observed; e.g., subject cannot sit still, may get up and walk   U Unable to assess     16. Dyskinetic Movement Ratings: Ratin  0 None   1 Minimal, may be extreme normal   2 Mild   3 Moderate   4 Severe   U Unable to assess     SIDE EFFECT ASSESSMENT:  Clinician Rating Form in COMPASS - Side Effect Assessment  Address side effects reported by the patient and rate using this scale  0 Not present   1 Minimal, may be extreme  normal   2 Mild   3 Moderate   4 Severe   U Unable to assess   P Present, but not related     Feeling dizzy or faint: 1  Blurred vision:0  Dry mouth: 2  Too much saliva/droolin  Nausea: 0  Constipation: 0  Increased appetite:0  Weight gain: n/a  Weight loss: n/a  Feeling tired/fatigue: 3  Daytime sedation: 0  Hypersomnia: 0  Insomnia: 2  Low libido: n/a  Other problems with sex: n/a  Breast enlargement or discharge:  N/a  Irregular Menstruation or amenorrhea: n/a  Other (please list and rate): urinary retention    RECENT SUBSTANCE USE:  Clinician Rating Form in COMPASS - Substance Use Assessment  Alcohol Use Severity: Ratin  0 none   1 use without impairment: drinks but no immediate social or medical impairment   2 use with impairment: e.g. becomes grossly intoxicated; alcohol use or withdrawal compromises school, work or social functioning; alcohol use or withdrawal exacerbates symptoms (e.g. gets depressed when drinking)     Marijuana Use Severity: Ratin  0 none   1 occasional use without impairment: e.g. uses marijuana a few days a month and has no immediate social or medical impairment   2 frequent use without impairment: e.g. uses marijuana several or more days a week but has no immediate social or medical impairment   3 use with impairment: e.g. becomes grossly intoxicated; marijuana use compromises school, work or social functioning; marijuana use exacerbates symptoms (e.g. gets paranoid when using)     Other Drug Use Severity: Ratin  0 none   1 occasional use without impairment: e.g. uses drug(s) a few days a month and has no immediate social or medical impairment   2 frequent use without impairment: e.g. uses drug(s) several or more days a week but has no immediate social or medical impairment   3 use with impairment: e.g. becomes grossly intoxicated; drug use compromises school, work or social functioning; drug use exacerbates symptoms (e.g. gets paranoid when using)     RECENT SOCIAL  HISTORY:  0  MEDICAL ROS:  Urinary retention.  Seizures r/o recently         First Episode of Psychosis History      DUP (duration untreated psychosis)3 years  Route to initial care: hospital  Medication adherence overall:  See above, Clinician Rating Form in COMPASS Item 13  General frequency of visits:  2-3x/wk  Participation in groups:  No  Cognitive Remediation:  No  Other treatment history: per navigate    Reviewed for completion of First Episode work-up:  Yes  First episode workup:  Done (if completed, see LABS for results)  MATRICS Consensus Cognitive Battery:  Not Done (if completed, see LABS for results)         Medical/Surgical History     Haldol [haloperidol]; Seroquel [quetiapine]; Adhesive tape; Percocet [oxycodone-acetaminophen]; Prednisone; Risperidone; Tramadol hcl; and Droperidol    Patient Active Problem List   Diagnosis     ADHD (attention deficit hyperactivity disorder)     Bipolar 1 disorder, manic, mild     Marijuana abuse     Polysubstance abuse (H)     GERD (gastroesophageal reflux disease)     Tobacco abuse     Abdominal pain, right upper quadrant     Intractable back pain     Optic neuritis     Cauda equina syndrome with neurogenic bladder (H)     Schizoaffective disorder, bipolar type (H)     PTSD (post-traumatic stress disorder)     Anxiety     Auditory hallucinations     Class 2 obesity due to excess calories in adult     Nephrolithiasis     Cyst of left ovary     Borderline personality disorder (H)     Cannabis dependence (H)     Depression     Episodic mood disorder (H)     History of heroin abuse     Moderate episode of recurrent major depressive disorder (H)     Opioid use disorder, severe, dependence (H)     Substance-induced psychotic disorder with hallucinations (H)     History of methamphetamine abuse     Nausea     Overdose     Suicidal ideation     Bella (H)     Spell of altered consciousness     Urinary retention            Medications     Current Outpatient Medications  "  Medication Sig Dispense Refill     Amino Acids (AMINO ACID PO) Take 1 capsule by mouth daily Unknown formulation and dose.       fluPHENAZine decanoate (PROLIXIN) 25 MG/ML injection Inject 0.1 mLs (2.5 mg) into the muscle every 21 days 5 mL 0     nicotine (NICODERM CQ) 21 MG/24HR 24 hr patch Place 1 patch onto the skin daily 30 patch 1     nicotine polacrilex (NICORETTE) 4 MG gum Place 1 each (4 mg) inside cheek every hour as needed for other (nicotine withdrawal symptoms) 100 tablet 1     ondansetron (ZOFRAN) 4 MG tablet Take 1 tablet (4 mg) by mouth every 8 hours as needed for nausea 30 tablet 1     PREBIOTIC PRODUCT PO Take 1 capsule by mouth daily Unknown formulation and dose.       eszopiclone (LUNESTA) 2 MG tablet Take 1 tablet (2 mg) by mouth nightly as needed for sleep 14 tablet 0     fluPHENAZine (PROLIXIN) 1 MG tablet Take 1 tablet (1 mg) by mouth 2 times daily 60 tablet 0     gabapentin (NEURONTIN) 300 MG capsule Take 2 capsules (600 mg) by mouth 3 times daily 90 capsule 0     lithium ER (LITHOBID) 300 MG CR tablet Take 1 tab, 300 mg, in the a.m. and 2 tabs, 600 mg in the p.m. 90 tablet 6     LORazepam (ATIVAN) 1 MG tablet Take 1 tablet (1 mg) by mouth 2 times daily as needed for anxiety 30 tablet 0     OLANZapine (ZYPREXA) 10 MG tablet Take 1 tablet (10 mg) by mouth daily as needed (Agitation) 30 tablet 0     traZODone (DESYREL) 50 MG tablet Take 1 tablet (50 mg) by mouth nightly as needed for sleep 14 tablet 0             Vitals     /78 (BP Location: Left arm, Patient Position: Sitting, Cuff Size: Adult Regular)   Pulse 98   Ht 1.702 m (5' 7\")   Wt 91.4 kg (201 lb 6.4 oz)   BMI 31.54 kg/m        Weight prior to medication:  n/a         Mental Status Exam     Alertness: alert  and oriented  Appearance: casually groomed  Behavior/Demeanor: cooperative, with fair  eye contact   Speech: soft  Language: intact  Psychomotor: normal or unremarkable  Mood: anxious  Affect: restricted; was congruent " to mood; was congruent to content  Thought Process/Associations: circumstantial  Thought Content:  Reports worries at work ;  Denies suicidal ideation, violent ideation and delusions  Perception:  Reports auditory hallucinations;  Denies visual hallucinations  Insight: fair  Judgment: fair  Cognition: does  appear grossly intact; formal cognitive testing was not done         Labs and Data     RATING SCALES:  N/A    PHQ9 TODAY = n/a  PHQ-9 SCORE 7/30/2019 8/14/2019 8/15/2019   PHQ-9 Total Score - - -   PHQ-9 Total Score 9 6 6       ANTIPSYCHOTIC LABS ROUTINE    [glu, A1C, lipids (focus LDL), liver enzymes, WBC, ANEU, Hgb, plts]   q12 mo  Recent Labs   Lab Test 06/29/19  0718 06/28/19  0705  11/05/18  1642  10/03/17  1226   * 98   < >  --    < > 110*   A1C  --   --   --  5.8*  --  5.8    < > = values in this interval not displayed.     Recent Labs   Lab Test 10/03/17  1226 05/13/17  0752   CHOL 179 149   TRIG 246* 78   LDL 80 68   HDL 50 65     Recent Labs   Lab Test 06/26/19 2027 06/04/19  1219   AST Unsatisfactory specimen - hemolyzed 24   ALT 40 25   ALKPHOS 54 75     Recent Labs   Lab Test 06/28/19  0705 06/27/19  0620 06/26/19 2027 06/04/19  1219   WBC 10.6 11.2* 12.2* 8.8   ANEU  --   --  8.3 5.3   HGB 14.0 12.6 12.6 14.9    365 379 337            Psychiatric Diagnoses       Schizoaffective disorder Bipolar type.         Assessment     TODAY pt presents with increased anxiety and concerns about safety at work given her Continuing spells. Seems anxious and dejected.      [place] PRESCRIPTION MONITORING PROGRAM [] was not checked today: not using controlled substances.    PSYCHOTROPIC DRUG INTERACTIONS:   None.  MANAGEMENT:  Monitoring for adverse effects         Plan     1) PSYCHOTROPIC MEDICATIONS:  Continue current medications for now except to stop guanfacine due to dry mouth and assess for improvement on that  -Continue oral fluphenazine, 1 mg twice daily; will discuss plan for conversion to  injectable, although need to assess whether we can do this when she is on a small dose    2) THERAPY:  Continue along with SEE.    3) NEXT DUE:    Labs- Lithium ordered in June. Will f/u.  Rating Scales- N/A    4) REFERRALS:    No Referrals needed    5) RTC: 3 weeks    6) CRISIS NUMBERS:   Provided routinely in AVS.      TREATMENT RISK STATEMENT:  The risks, benefits, alternatives and potential adverse effects have been discussed and are understood by the pt. The pt understands the risks of using street drugs or alcohol. There are no medical contraindications, the pt agrees to treatment with the ability to do so. The pt knows to call the clinic for any problems or to access emergency care if needed.  Medical and substance use concerns are documented above.  Psychotropic drug interaction check was done, including changes made today.      PROVIDER:  Miladis Hamilton MD

## 2019-07-22 NOTE — TELEPHONE ENCOUNTER
PATIENT HAS RESTRICTION FOR MEDICATIONS TO BE WRITTEN BY YOU. HAD A OFFICE VISIT TODAY AND THE SCRIPTS CAN NOT BE FILLED THE LITHOBID ALSO NEEDS TO BE PUT INTO 1 SCRIPT BECAUSE INSURANCE DOES NOT PAY FOR THE SAME SCRIPT TO BE WRITTEN WITH DIFFERENT DIRECTIONS.    THANK YOU SO MUCH          Ayana Beckett [facility]  Certified Pharmacy Technician

## 2019-07-22 NOTE — PROGRESS NOTES
NAVIGATE SEE Progress Note   For Supported Employment & Education    NAVIGATE Enrollee: Ayana Prasad (1990)     MRN: [id number]  Date:  7/18/19    Clinician: NAVIGATE Supported Employment & , Linh Hamilton    1. Client Status Update:   Ayana Prasad is interested in education (Client continuing a class or school program)employment    2. People present:   SEE/Writer  Client: Ayana Prasad  Employer/Work Supervisor    3. Length of Actual Contact: 120 minutes   Traveled? Yes  Total Travel Time: 90 minutes    4. Location of contact:  Employer/Business    5. Brief description of session, contact, or client status (include: strategies, interventions, client reaction to contact, next steps, etc)    Writer visited Ayana at work at a [employer] shop. Met with Jaz, her supervisor as well to check in together. Jaz had called this writer requesting a visit as she has concerns regarding 2 absences and needing to provide reminders to Ayana for simple tasks. Ayana understood her employer's concerns and we brainstormed ways that Ayana can remember certain tasks (placing bright colored post its on the walls, clarifying her to-do list and adding in more detail, having her supervisor show her how to complete a task, etc).  Writer provided safety assessment for the workplace and offered to provide a seizure protocol/seizure first aid sign as well. Jaz gave positive feedback for Ayana and expressed how much she enjoyed having her work there but had concerns about missing work for appts. Recommended Ayana work on her organization or have one day off a week in order to go to dr [facility]. She has missed several appts so reinforced the importance of adding each appt into her phone with reminders.     6. Completion of mutually agreed upon client task from previous meeting:  Not Applicable    7. Orientation and Treatment Planning:  Pursuing current SEE goals    8. Assessment:  Assessing client's need for  follow-along supports    9. Placement:  Not Applicable    10. Follow Along Supports: (for clients who are working or attending school)   Employment  (Reviewing stress management for work)    11. Mutually agreed upon client task for next meeting:     na    12. Next Meeting Scheduled for: 7/24/ 430    Linh WALSH Supported Employment &

## 2019-07-23 ASSESSMENT — ANXIETY QUESTIONNAIRES: GAD7 TOTAL SCORE: 14

## 2019-07-23 NOTE — TELEPHONE ENCOUNTER
Please clarify.  What is the patient/pharmacy requesting?  Lithobid was prescribed by Dr. Hamilton (psychiatrist) yesterday.  Why was this routed to Becki Avery?  In regards to multiple prescriptions, this should be routed to the Dr. Hamilton.

## 2019-07-24 ENCOUNTER — ALLIED HEALTH/NURSE VISIT (OUTPATIENT)
Dept: PSYCHIATRY | Facility: CLINIC | Age: 29
End: 2019-07-24
Payer: COMMERCIAL

## 2019-07-24 DIAGNOSIS — F25.0 SCHIZOAFFECTIVE DISORDER, BIPOLAR TYPE (H): Primary | ICD-10-CM

## 2019-07-24 NOTE — TELEPHONE ENCOUNTER
Please confirm with patient that the lithium (lithobid) is correct on medication list (take lithium 300 mg in the morning and 600 mg at night time).    1. As a covering physician, will her insurance accept me refilling it?    2. Wait until Becki returns next Monday.

## 2019-07-24 NOTE — TELEPHONE ENCOUNTER
The insurance will not allow any other provider to fill. We will wait til Monday. Thank you for the communication          Ayana Beckett [facility]  Certified Pharmacy Technician

## 2019-07-24 NOTE — TELEPHONE ENCOUNTER
Patient was seen by Dr Hamilton; however per insurance the patient is restricted to have all prescriptions to be written by Becki Avery. Not sure the protocol for how to proceed with these prescriptions. The lithobid was written as 2 separate scripts with 2 sets of directions, but insurance will not pay for that. Please let us know how to proceed.    Thank you          Ayana Beckett [facility]   Certified Pharmacy Technician

## 2019-07-25 NOTE — PROGRESS NOTES
"NAVIGATE SEE Progress Note   For Supported Employment & Education    NAVIGATE Enrollee: Ayana Prasad (1990)     MRN: [id number]  Date:  7/24/19  Clinician: NAVIGATE Supported Employment & , Linh Hamilton    1. Client Status Update:   Ayana Prasad is interested in employment (Client stopped competitive employment)    2. People present:   SEE/Writer  Client: Ayana Prasad    3. Length of Actual Contact: 60 minutes   Traveled? Yes  Total Travel Time: 90 minutes    4. Location of contact:  Other: [employer] shop    5. Brief description of session, contact, or client status (include: strategies, interventions, client reaction to contact, next steps, etc)    Writer and Ayana met at a [employer] shop for a follow up SEE session. Ayana reports that she put in her 2 weeks notice at the [employer] shop yesterday because she felt her supervisor was getting 'too personal' with her by asking questions about her health, history of therapy and medications. She said she would like to look for another job where she \"doesn't have to think\" and can \"just work and not be bothered\". We discussed her recent struggles with work, school and family life and offered Ayana to consider potentially explore a higher level of care such as a day treatment or CD/MI treatment to build skills. Ayana said she had been contemplating doing this, so this writer offered a phone [facility] inform her of her options. Ayana was open to this.     She reports not really wanting to work but feeling as if her family doesn't understand how difficult it is for her work while she is experiencing symptoms. She feels strongly she does not want to let others down and even mentioned she feels she needs to work as she is meeting with me to discuss it. We talked about options and I encouraged her to think about learning coping and resiliency skills before trying a new job or school.     She reports she recently got caught climbing a " "billboard at night with friends and has been going out and driving around town with these friends \"getting into trouble\". Writer encouraged her to talk with her family about this and will route conversation to team regarding safety follow up.      6. Completion of mutually agreed upon client task from previous meeting:  Not Applicable    7. Orientation and Treatment Planning:  Pursuing current SEE goals    8. Assessment:  Assessing client's need for follow-along supports    9. Placement:  Not Applicable    10. Follow Along Supports: (for clients who are working or attending school)   Employment  (Reviewing coping skills for symptoms for work)    11. Mutually agreed upon client task for next meeting:     na    12. Next Meeting Scheduled for: tbd - will follow up with team for potential CD referral    Linh GANATE Supported Employment &   "

## 2019-07-29 RX ORDER — LITHIUM CARBONATE 300 MG/1
TABLET, FILM COATED, EXTENDED RELEASE ORAL
Qty: 90 TABLET | Refills: 6 | Status: SHIPPED | OUTPATIENT
Start: 2019-07-29 | End: 2019-09-16

## 2019-07-30 ENCOUNTER — OFFICE VISIT (OUTPATIENT)
Dept: PSYCHIATRY | Facility: CLINIC | Age: 29
End: 2019-07-30
Payer: COMMERCIAL

## 2019-07-30 ENCOUNTER — NURSE TRIAGE (OUTPATIENT)
Dept: NURSING | Facility: CLINIC | Age: 29
End: 2019-07-30

## 2019-07-30 DIAGNOSIS — F25.0 SCHIZOAFFECTIVE DISORDER, BIPOLAR TYPE (H): Primary | ICD-10-CM

## 2019-07-30 ASSESSMENT — PATIENT HEALTH QUESTIONNAIRE - PHQ9: SUM OF ALL RESPONSES TO PHQ QUESTIONS 1-9: 9

## 2019-07-30 NOTE — TELEPHONE ENCOUNTER
"Patient calling. States she had a seizure this morning.    States she was in the hospital approximately a month ago for the exact same symptoms.     She states there was a witness today who told her she was \"shaking\" all over. Denies headache or injury from fall. States she was incontinent.    Patient has scheduled appointment on Friday.    Protocol and care advice reviewed  Caller states understanding of the recommended disposition. Patient will go to [facility] as soon as transportation is available.    Advised to call back if further questions or concerns      Additional Information    Negative: First seizure ever    Negative: Epileptic seizure (in adult with known epilepsy) and continues > 5 minutes    Negative: Two or more seizures and stays confused between seizures    Negative: Bluish (or gray) lips or face    Negative: Head injury caused the seizure    Negative: Pregnant or postpartum (<1 month)    Negative: Known poisoning or overdose    Negative: Seizure in a swimming pool    Negative: Has diabetes (diabetes mellitus)    Negative: Unresponsive (can't be awakened) after the seizure stops and persists > 5 minutes    Negative: Acting confused (e.g., disoriented, slurred speech) after the seizure stops and persists > 30 minutes    Negative: Sounds like a life-threatening emergency to the triager    Negative: Second seizure occurs on the same day    Negative: Fever > 99.5 F (37.5 C)    Negative: Severe headache    Negative: High-risk adult (e.g., alcohol or drug abuse)    Negative: Wants to sleep after the seizure and persists much longer than usual    Negative: Patient sounds very sick or weak to the triager    Not on or ran out of seizure medicine (anticonvulsant)    Protocols used: [id number]      "

## 2019-07-30 NOTE — PROGRESS NOTES
JILLIAN Clinician Contact & Progress Note  For Individual Resiliency Training (IRT)  A Part of the [facility] First Episode of Psychosis Program    NAVIGATE Enrollee: Ayana Prasad (1990)     MRN: [id number]  Date:  7/30/19  Diagnosis: Schizoaffective, bipolar type (F25.0)  Clinician: JILLIAN Individual Resiliency Trainer, JULISA Ring     1. Type of contact: (majority of time spent)  IRT Session    2. People present:   Writer  Client: Ayana Prasad    3. Length of Actual Contact: Start Time: 2:00; End Time: 3:00   Traveled?    No     4. Location of contact:  [facility], [place]    5. Did the client complete the home practice option(s) from the previous session: Partially Completed    6. Motivational Teaching Strategies:  Connect info and skills with personal goals  Promote hope and positive expectations  Explore pros and cons of change  Re-frame experiences in positive light    7. Educational Teaching Strategies:  Review of written material/education  Relate information to client's experience  Break down information into small chunks  Adopt client's language     8. CBT Teaching Strategies:  Reinforcement and shaping (positive feedback for steps towards goals and gains in knowledge & skills)  Cognitive restructuring (identify thoughts related to negative feelings)    9. IRT Module(s) Addressed:  Module 8 - Dealing with Negative Feelings  Module 10 - Substance Use    10. Techniques utilized:   [place] announced at beginning of session  Review of homework  Review of goal  Review of previous meeting  Present new material  Problem-solving practice  Help client choose a home practice option  Summarize progress made in current session    11. Mental Status Exam:    Alertness: alert  and oriented  Appearance: casually groomed  Behavior/Demeanor: cooperative and pleasant, with good  eye contact   Speech: normal and regular rate and rhythm  Language: intact. Preferred language identified as  "English.  Psychomotor: fidgety  Mood: anxious  Affect: appropriate; was congruent to mood; was congruent to content  Thought Process/Associations: perseverative  Thought Content:  Reports preoccupations;  Denies suicidal and violent ideation  Perception:  Reports auditory hallucinations;  Denies none  Insight: fair  Judgment: fair  Cognition: does  appear grossly intact; formal cognitive testing was not done  Suicidal ideation: denies SI, denies intent,  and denies plan  Homicidal Ideation: denies    12. Assessment/Progress Note:     This writer met with Ayana for a follow-up IRT session. Ayana stated she had a seizure-like episode today after waking up from a nap. She noted this only occurs when waking up from sleep and she will be seen by a physician later this week. Ayana stated her physical concerns are a main protective factor in abstaining from substance use. This writer then assisted Ayana in developing a plan of events outside of substance use for her birthday, as this day has been a trigger in the past. Ayana mentioned she recently attended an NA meeting and feels she can manage her cravings independently. This writer encouraged consideration in MICD treatment, as Ayana reported cravings daily and marijuana use last week. Ayana noted she is not interested in a higher level of care at this time, as she \"does not want to focus life on mental health and problems.\" This writer used a harm reduction approach in praising Ayana for problem solving triggers and utilizing support through NA. Ayana discussed a self-stigma about obtaining therapeutic services. This writer normalized her ambivalence about treatment, while also challenging the stigmatizing beliefs. Ayana shared she experiences auditory hallucinations daily, which she described as negative commentary about herself. She reported a Zoroastrian delusion when in the hospital, but denied any currently. She expressed interest in targeting anxious thoughts, as " "she has been increasingly isolative from family. Ayana agreed to complete a thought log throughout the week to identify any specific patterns.     13. Plan/Referrals:     This writer will continue to use a harm reduction model and motivational interviewing to discuss levels of support.     This writer will provide education on CBT and the impact of negative thinking patterns.     Billing for \"Interactive Complexity\"?    No      JULISA Ring    NAVIGATE Individual Resiliency Trainer     Attestation:    I did not see this patient directly. This patient is discussed with me in individual clinical social work supervision, and I agree with the plan as documented.     BOB RenteriaSW, MSW, LICSW, August 26, 2019      "

## 2019-07-31 DIAGNOSIS — F31.11 BIPOLAR 1 DISORDER, MANIC, MILD (H): ICD-10-CM

## 2019-07-31 DIAGNOSIS — F25.0 SCHIZOAFFECTIVE DISORDER, BIPOLAR TYPE (H): ICD-10-CM

## 2019-07-31 RX ORDER — FLUPHENAZINE HYDROCHLORIDE 1 MG/1
1 TABLET ORAL 2 TIMES DAILY
Qty: 60 TABLET | Refills: 0 | Status: SHIPPED | OUTPATIENT
Start: 2019-07-31 | End: 2019-08-14

## 2019-07-31 RX ORDER — OLANZAPINE 10 MG/1
10 TABLET ORAL DAILY PRN
Qty: 30 TABLET | Refills: 0 | Status: SHIPPED | OUTPATIENT
Start: 2019-07-31 | End: 2019-10-22

## 2019-07-31 NOTE — TELEPHONE ENCOUNTER
Requested Prescriptions   Pending Prescriptions Disp Refills     fluPHENAZine (PROLIXIN) 1 MG tablet  Last Written Prescription Date:  7/22/19  Last Fill Quantity: 60,  # refills: 0   Last office visit: 6/24/2019 with prescribing provider:  steffany   Future Office Visit:   Next 5 appointments (look out 90 days)    Aug 02, 2019  9:40 AM CDT  [facility] BROOKLYN Ford CNP  [facility] [address] [id number] [phone]          60 tablet 0     Sig: Take 1 tablet (1 mg) by mouth 2 times daily       There is no refill protocol information for this order          OLANZapine (ZYPREXA) 10 MG tablet  Last Written Prescription Date:  7/22/19  Last Fill Quantity: 30,  # refills: 0   Last office visit: 6/24/2019 with prescribing provider:  steffany  Future Office Visit:   Next 5 appointments (look out 90 days)    Aug 02, 2019  9:40 AM CDT  Office Visit with BROOKLYN Ford CNP  [facility] ([facility] [address] [id number] [phone]          30 tablet 0     Sig: Take 1 tablet (10 mg) by mouth daily as needed (Agitation)       Antipsychotic Medications Failed - 7/31/2019 11:52 AM        Failed - Lipid panel on file within the past 12 months     Recent Labs   Lab Test 10/03/17  1226   CHOL 179   TRIG 246*   HDL 50   LDL 80   NHDL 129               Passed - Blood pressure under 140/90 in past 12 months     BP Readings from Last 3 Encounters:   07/22/19 118/78   07/05/19 123/87   06/29/19 134/77                 Passed - Patient is 12 years of age or older        Passed - CBC on file in past 12 months     Recent Labs   Lab Test 06/28/19  0705   WBC 10.6   RBC 5.03   HGB 14.0   HCT 44.1                    Passed - Heart Rate on file within past 12 months     Pulse Readings from Last 3 Encounters:   07/22/19 98   07/05/19 92   06/29/19 72               Passed - A1c or  "Glucose on file in past 12 months     Recent Labs   Lab Test 06/29/19  0718  11/05/18  1642   *   < >  --    A1C  --   --  5.8*    < > = values in this interval not displayed.       Please review patients last 3 weights. If a weight gain of >10 lbs exists, you may refill the prescription once after instructing the patient to schedule an appointment within the next 30 days.    Wt Readings from Last 3 Encounters:   07/22/19 91.4 kg (201 lb 6.4 oz)   07/05/19 90.7 kg (200 lb)   06/26/19 90.7 kg (200 lb)             Passed - Medication is active on med list        Passed - Patient is not pregnant        Passed - No positve pregnancy test on file in past 12 months        Passed - Recent (6 mo) or future (30 days) visit within the authorizing provider's specialty     Patient had office visit in the last 6 months or has a visit in the next 30 days with authorizing provider or within the authorizing provider's specialty.  See \"Patient Info\" tab in inbasket, or \"Choose Columns\" in Meds & Orders section of the refill encounter.              "

## 2019-07-31 NOTE — TELEPHONE ENCOUNTER
Routing refill request to provider for review/approval because:  Drug not on the [facility] refill protocol   Last signed by another provider.    Jyoti Hollingsworth RN

## 2019-08-02 ENCOUNTER — OFFICE VISIT (OUTPATIENT)
Dept: FAMILY MEDICINE | Facility: CLINIC | Age: 29
End: 2019-08-02
Payer: COMMERCIAL

## 2019-08-02 VITALS
WEIGHT: 209.6 LBS | TEMPERATURE: 98 F | RESPIRATION RATE: 18 BRPM | SYSTOLIC BLOOD PRESSURE: 118 MMHG | HEART RATE: 86 BPM | BODY MASS INDEX: 32.83 KG/M2 | DIASTOLIC BLOOD PRESSURE: 80 MMHG | OXYGEN SATURATION: 99 %

## 2019-08-02 DIAGNOSIS — G47.9 SLEEP DISORDER: ICD-10-CM

## 2019-08-02 DIAGNOSIS — R33.9 URINARY RETENTION: ICD-10-CM

## 2019-08-02 DIAGNOSIS — F25.0 SCHIZOAFFECTIVE DISORDER, BIPOLAR TYPE (H): ICD-10-CM

## 2019-08-02 DIAGNOSIS — R56.9 SEIZURE-LIKE ACTIVITY (H): Primary | ICD-10-CM

## 2019-08-02 PROCEDURE — 99214 OFFICE O/P EST MOD 30 MIN: CPT | Performed by: NURSE PRACTITIONER

## 2019-08-02 ASSESSMENT — ENCOUNTER SYMPTOMS
ABDOMINAL DISTENTION: 0
DYSPHORIC MOOD: 0
NAUSEA: 0
MYALGIAS: 0
RHINORRHEA: 0
TREMORS: 1
DIARRHEA: 0
NERVOUS/ANXIOUS: 0
ABDOMINAL PAIN: 0
FATIGUE: 0
DIZZINESS: 0
ARTHRALGIAS: 0
NUMBNESS: 0
COUGH: 0
CHEST TIGHTNESS: 0
WHEEZING: 0
SORE THROAT: 0
PALPITATIONS: 0
CONSTIPATION: 0
SLEEP DISTURBANCE: 1
LIGHT-HEADEDNESS: 0
SEIZURES: 1
SHORTNESS OF BREATH: 0
HEADACHES: 0
VOMITING: 0

## 2019-08-02 ASSESSMENT — PAIN SCALES - GENERAL: PAINLEVEL: NO PAIN (0)

## 2019-08-02 NOTE — PROGRESS NOTES
Subjective     Ayana Prasad is a 28 year old female who presents to clinic today for the following health issues:    HPI     Chief Complaint   Patient presents with     Seizures     Tiraged 7/30/19       Current Outpatient Medications   Medication Sig Dispense Refill     Amino Acids (AMINO ACID PO) Take 1 capsule by mouth daily Unknown formulation and dose.       eszopiclone (LUNESTA) 2 MG tablet Take 1 tablet (2 mg) by mouth nightly as needed for sleep 14 tablet 0     fluPHENAZine (PROLIXIN) 1 MG tablet Take 1 tablet (1 mg) by mouth 2 times daily 60 tablet 0     fluPHENAZine decanoate (PROLIXIN) 25 MG/ML injection Inject 0.1 mLs (2.5 mg) into the muscle every 21 days 5 mL 0     gabapentin (NEURONTIN) 300 MG capsule Take 2 capsules (600 mg) by mouth 3 times daily 90 capsule 0     lithium ER (LITHOBID) 300 MG CR tablet Take 1 tab, 300 mg, in the a.m. and 2 tabs, 600 mg in the p.m. 90 tablet 6     LORazepam (ATIVAN) 1 MG tablet Take 1 tablet (1 mg) by mouth 2 times daily as needed for anxiety 30 tablet 0     nicotine (NICODERM CQ) 21 MG/24HR 24 hr patch Place 1 patch onto the skin daily 30 patch 1     nicotine polacrilex (NICORETTE) 4 MG gum Place 1 each (4 mg) inside cheek every hour as needed for other (nicotine withdrawal symptoms) 100 tablet 1     OLANZapine (ZYPREXA) 10 MG tablet Take 1 tablet (10 mg) by mouth daily as needed (Agitation) 30 tablet 0     ondansetron (ZOFRAN) 4 MG tablet Take 1 tablet (4 mg) by mouth every 8 hours as needed for nausea 30 tablet 1     PREBIOTIC PRODUCT PO Take 1 capsule by mouth daily Unknown formulation and dose.       traZODone (DESYREL) 50 MG tablet Take 1 tablet (50 mg) by mouth nightly as needed for sleep 14 tablet 0     Allergies   Allergen Reactions     Haldol [Haloperidol] Other (See Comments)     Makes patient very angry and anxious     Seroquel [Quetiapine] Palpitations     Spent 2 weeks in the hospital due to having seroquel, caused palpitations and QT prolongation      Adhesive Tape Hives     Percocet [Oxycodone-Acetaminophen] Nausea and Vomiting     Prednisone Other (See Comments) and Hives     Suicidal ideation     Risperidone Other (See Comments)     Tramadol Hcl Nausea and Vomiting     Droperidol Anxiety       Reviewed and updated as needed this visit by Provider               Objective    /80 (BP Location: Right arm, Patient Position: Sitting, Cuff Size: Adult Regular)   Pulse 86   Temp 98  F (36.7  C) (Tympanic)   Resp 18   Wt 95.1 kg (209 lb 9.6 oz)   SpO2 99%   BMI 32.83 kg/m    Body mass index is 32.83 kg/m .      Thinks had another seizure the other day. Will twitch. Does not recall this. 13 yo daugther called it twitching. Reports went off and on from 5PM-10AM. Did not bite tongue but did bit lip. Did loose control of bladder. Not currently having any back pain. Does self cath at home daily. Unsure how much urine is getting when does self cath at home. No scheduled appointment with urology. Was recently inpt for seizures. Had video EEG which was negative. Reports thing that happened the was the same that happened when was in hospital. Reports wife and daughter told her to sit down, but instead did not listen and ended up hitting head on cupboard. Does not recall any of this. After events will have neck pain and also a very bad headache. Will take some tylenol and that does seem to help decrease the pain for a bit. Denies using any illicit substances at this time, no alcohol.     Sees mental health weekly, has appointment with physciatist in Sept. Feels like current meds are helping. Voices are not as loud and are manageable. They are no longer telling her to do things to hurt herself.     Thinks lack of sleep maybe or trigger for this. Also, thinks that gabapentin could be cause of seizures. Thinks is getting 3-4 hours of sleep per night. Is [employer] working at [employer].     Assessment & Plan      Review of Systems   Constitutional: Negative for fatigue.    HENT: Negative for ear pain, rhinorrhea and sore throat.    Eyes: Negative for visual disturbance.   Respiratory: Negative for cough, chest tightness, shortness of breath and wheezing.    Cardiovascular: Negative for chest pain, palpitations and leg swelling.   Gastrointestinal: Negative for abdominal distention, abdominal pain, constipation, diarrhea, nausea and vomiting.   Endocrine: Negative for cold intolerance and heat intolerance.   Genitourinary:        Urinary retention      Musculoskeletal: Negative for arthralgias and myalgias.   Skin: Negative for rash.        Bite to lip     Neurological: Positive for tremors and seizures (like activity ). Negative for dizziness, light-headedness, numbness and headaches.   Psychiatric/Behavioral: Positive for sleep disturbance (not enough). Negative for dysphoric mood. The patient is not nervous/anxious.        Physical Exam   Constitutional: She appears well-developed and well-nourished.   HENT:   Head: Normocephalic and atraumatic.   Right Ear: Tympanic membrane and external ear normal. No middle ear effusion.   Left Ear: Tympanic membrane and external ear normal.  No middle ear effusion.   Nose: No mucosal edema.   Mouth/Throat: Oropharynx is clear and moist and mucous membranes are normal.   Eyes: Pupils are equal, round, and reactive to light. EOM are normal.   Neck: Carotid bruit is not present. No thyromegaly present.   Cardiovascular: Normal rate, regular rhythm and normal heart sounds.   Pulmonary/Chest: Effort normal and breath sounds normal.   Abdominal: Soft. Normal appearance and bowel sounds are normal.   Musculoskeletal: She exhibits no edema.   Neurological: She is alert. She has normal strength. She displays no seizure activity.   Skin: Skin is warm and dry.   Psychiatric: She has a normal mood and affect. Her behavior is normal.         1. Seizure-like activity (H)  Previous hospital results reviewed.  We will plan to refer to neurology.  - NEUROLOGY  ADULT REFERRAL    2. Urinary retention  Continue with self cath daily.  Encouraged to schedule appointment with urology.    3. Schizoaffective disorder, bipolar type (H)  Continue to follow with mental health.  Will work on allowing psychiatry to be able to order meds.  Currently, is restricted to me through the state.    4. Sleep disorder  Managed currently by mental health.    Return in about 3 months (around 11/2/2019) for A Routine Visit.    BROOKLYN Cruz [facility]

## 2019-08-05 DIAGNOSIS — F31.11 BIPOLAR 1 DISORDER, MANIC, MILD (H): ICD-10-CM

## 2019-08-05 DIAGNOSIS — Z72.0 TOBACCO ABUSE: ICD-10-CM

## 2019-08-05 DIAGNOSIS — F25.0 SCHIZOAFFECTIVE DISORDER, BIPOLAR TYPE (H): ICD-10-CM

## 2019-08-05 NOTE — TELEPHONE ENCOUNTER
Eszopiclone 2mg      Last Written Prescription Date:  7/19/19  Last Fill Quantity: 14,   # refills: 0  Last Office Visit: 8/2/19  Future Office visit:       Routing refill request to provider for review/approval because:  Drug not on the [facility], [facility] or [facility] refill protocol or controlled substance    Lorazepam 1mg      Last Written Prescription Date:  7/19/19  Last Fill Quantity: 30,   # refills: 0  Last Office Visit: 8/2/19  Future Office visit:       Routing refill request to provider for review/approval because:  Drug not on the [facility], [facility] or [facility] refill protocol or controlled substance    Lauryn Barrera CPhT  [facility] [id number] [address] [place] [address]  Phone: [phone]  Fax: [phone]

## 2019-08-07 RX ORDER — ESZOPICLONE 2 MG/1
2 TABLET, FILM COATED ORAL
Qty: 14 TABLET | Refills: 0 | Status: SHIPPED | OUTPATIENT
Start: 2019-08-07 | End: 2019-10-09

## 2019-08-07 RX ORDER — LORAZEPAM 1 MG/1
1 TABLET ORAL 2 TIMES DAILY PRN
Qty: 30 TABLET | Refills: 0 | Status: SHIPPED | OUTPATIENT
Start: 2019-08-07 | End: 2019-09-16

## 2019-08-08 ENCOUNTER — OFFICE VISIT (OUTPATIENT)
Dept: PSYCHIATRY | Facility: CLINIC | Age: 29
End: 2019-08-08
Payer: COMMERCIAL

## 2019-08-08 DIAGNOSIS — F25.0 SCHIZOAFFECTIVE DISORDER, BIPOLAR TYPE (H): Primary | ICD-10-CM

## 2019-08-14 ENCOUNTER — OFFICE VISIT (OUTPATIENT)
Dept: PSYCHIATRY | Facility: CLINIC | Age: 29
End: 2019-08-14
Payer: COMMERCIAL

## 2019-08-14 ENCOUNTER — ALLIED HEALTH/NURSE VISIT (OUTPATIENT)
Dept: PSYCHIATRY | Facility: CLINIC | Age: 29
End: 2019-08-14
Payer: COMMERCIAL

## 2019-08-14 VITALS — DIASTOLIC BLOOD PRESSURE: 80 MMHG | HEART RATE: 89 BPM | SYSTOLIC BLOOD PRESSURE: 117 MMHG

## 2019-08-14 DIAGNOSIS — F25.0 SCHIZOAFFECTIVE DISORDER, BIPOLAR TYPE (H): ICD-10-CM

## 2019-08-14 DIAGNOSIS — F25.0 SCHIZOAFFECTIVE DISORDER, BIPOLAR TYPE (H): Primary | ICD-10-CM

## 2019-08-14 DIAGNOSIS — F31.11 BIPOLAR 1 DISORDER, MANIC, MILD (H): ICD-10-CM

## 2019-08-14 RX ORDER — TRAZODONE HYDROCHLORIDE 50 MG/1
50 TABLET, FILM COATED ORAL
Qty: 14 TABLET | Refills: 0 | Status: SHIPPED | OUTPATIENT
Start: 2019-08-14 | End: 2019-09-16

## 2019-08-14 RX ORDER — GABAPENTIN 300 MG/1
600 CAPSULE ORAL 3 TIMES DAILY
Qty: 90 CAPSULE | Refills: 0 | Status: SHIPPED | OUTPATIENT
Start: 2019-08-14 | End: 2019-09-23

## 2019-08-14 RX ORDER — FLUPHENAZINE HYDROCHLORIDE 1 MG/1
1 TABLET ORAL 2 TIMES DAILY
Qty: 60 TABLET | Refills: 0 | Status: SHIPPED | OUTPATIENT
Start: 2019-08-14 | End: 2019-09-13

## 2019-08-14 ASSESSMENT — ANXIETY QUESTIONNAIRES
4. TROUBLE RELAXING: SEVERAL DAYS
5. BEING SO RESTLESS THAT IT IS HARD TO SIT STILL: NEARLY EVERY DAY
2. NOT BEING ABLE TO STOP OR CONTROL WORRYING: NOT AT ALL
6. BECOMING EASILY ANNOYED OR IRRITABLE: SEVERAL DAYS
1. FEELING NERVOUS, ANXIOUS, OR ON EDGE: SEVERAL DAYS
3. WORRYING TOO MUCH ABOUT DIFFERENT THINGS: NOT AT ALL
7. FEELING AFRAID AS IF SOMETHING AWFUL MIGHT HAPPEN: NOT AT ALL
GAD7 TOTAL SCORE: 6

## 2019-08-15 ENCOUNTER — OFFICE VISIT (OUTPATIENT)
Dept: PSYCHIATRY | Facility: CLINIC | Age: 29
End: 2019-08-15
Payer: COMMERCIAL

## 2019-08-15 DIAGNOSIS — F25.0 SCHIZOAFFECTIVE DISORDER, BIPOLAR TYPE (H): ICD-10-CM

## 2019-08-15 DIAGNOSIS — F31.11 BIPOLAR 1 DISORDER, MANIC, MILD (H): ICD-10-CM

## 2019-08-15 DIAGNOSIS — F25.0 SCHIZOAFFECTIVE DISORDER, BIPOLAR TYPE (H): Primary | ICD-10-CM

## 2019-08-15 RX ORDER — FLUPHENAZINE HYDROCHLORIDE 1 MG/1
1 TABLET ORAL 2 TIMES DAILY
Qty: 60 TABLET | Refills: 0 | OUTPATIENT
Start: 2019-08-15

## 2019-08-15 RX ORDER — GABAPENTIN 300 MG/1
600 CAPSULE ORAL 3 TIMES DAILY
Qty: 90 CAPSULE | Refills: 0 | OUTPATIENT
Start: 2019-08-15

## 2019-08-15 RX ORDER — TRAZODONE HYDROCHLORIDE 50 MG/1
50 TABLET, FILM COATED ORAL
Qty: 14 TABLET | Refills: 0 | OUTPATIENT
Start: 2019-08-15

## 2019-08-15 ASSESSMENT — PATIENT HEALTH QUESTIONNAIRE - PHQ9
5. POOR APPETITE OR OVEREATING: MORE THAN HALF THE DAYS
SUM OF ALL RESPONSES TO PHQ QUESTIONS 1-9: 6

## 2019-08-15 ASSESSMENT — ANXIETY QUESTIONNAIRES
1. FEELING NERVOUS, ANXIOUS, OR ON EDGE: NEARLY EVERY DAY
3. WORRYING TOO MUCH ABOUT DIFFERENT THINGS: NEARLY EVERY DAY
GAD7 TOTAL SCORE: 6
GAD7 TOTAL SCORE: 17
3. WORRYING TOO MUCH ABOUT DIFFERENT THINGS: NOT AT ALL
5. BEING SO RESTLESS THAT IT IS HARD TO SIT STILL: NEARLY EVERY DAY
7. FEELING AFRAID AS IF SOMETHING AWFUL MIGHT HAPPEN: NOT AT ALL
2. NOT BEING ABLE TO STOP OR CONTROL WORRYING: NOT AT ALL
7. FEELING AFRAID AS IF SOMETHING AWFUL MIGHT HAPPEN: NOT AT ALL
2. NOT BEING ABLE TO STOP OR CONTROL WORRYING: MORE THAN HALF THE DAYS
6. BECOMING EASILY ANNOYED OR IRRITABLE: NEARLY EVERY DAY
5. BEING SO RESTLESS THAT IT IS HARD TO SIT STILL: MORE THAN HALF THE DAYS
6. BECOMING EASILY ANNOYED OR IRRITABLE: SEVERAL DAYS
1. FEELING NERVOUS, ANXIOUS, OR ON EDGE: SEVERAL DAYS
4. TROUBLE RELAXING: NEARLY EVERY DAY
GAD7 TOTAL SCORE: 6
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
## 2019-08-16 ASSESSMENT — ANXIETY QUESTIONNAIRES: GAD7 TOTAL SCORE: 17

## 2019-08-19 NOTE — PROGRESS NOTES
"NAVIGATE SEE Progress Note   For Supported Employment & Education    NAVIGATE Enrollee: Ayana Prasad (1990)     MRN: [id number]  Date:  8/14/19    Clinician: NAVIGATE Supported Employment & , Linh Hamilton    1. Client Status Update:   Ayana Prasad is interested in employment (Client developed employement goals)    2. People present:   SEE/Writer  Client: Ayana Prasad  Nurse: Esetfania Armijo    3. Length of Actual Contact: 75 minutes   Traveled? No    4. Location of contact:  Psychiatry Clinic, [place]    5. Brief description of session, contact, or client status (include: strategies, interventions, client reaction to contact, next steps, etc)      Writer and Ayana Prasad met at [facility] clinic for a follow up Supported Employment and Education (SEE) session. Today's agenda included: reviewing and editing her resume for a new job, exploring job postings online and psychoeducation on medications (provided by nurse Estefania Armijo).     Ayana reports she will be staying with her parents in [place], [place] for 7 weeks to 'reset'. She reports \"something big happened at home and I need to just get away\". Writer encouraged Ayana to come to her scheduled IRT meeting with JULISA Ring tomorrow to discuss what happened in further detail and to create a plan for her (medications, skills, home practice or phone calls) while she is away. Ayana plans on having her wife drop her off [place] and will stay with her parents. She plans on finding a part time job for cash, fishing, walking and \"just getting some peace and quiet, maybe work on school stuff\". Ayana is enrolled in an online Environmental Studies Masters program as [place] and as of today is not passing either of her classes due to stress. Offered to check in via phone or telemedicine if she is unable to get her grades back up.     We reviewed and updated her resume, as well as searched online for jobs. Suggested Ayana review [employer], " look for postings at gas stations/[employer] in [place], use her parents' network, etc.    At the end of the session, Estefania Armijo RN joined to provide information regarding medications (per Ayana's report she stopped all meds 2 days ago). Will f/u with her to ensure she has ride to [employer] appt tomorrow.       6. Completion of mutually agreed upon client task from previous meeting:  Not Applicable    7. Orientation and Treatment Planning:  Developing SEE treatment plan goals    8. Assessment:  Assisting client to visit work or school settings to develop client preferences and goals re: work and/or school    9. Placement:  Not Applicable    10. Follow Along Supports: (for clients who are working or attending school)   Not Applicable    11. Mutually agreed upon client task for next meeting:      na    12. Next Meeting Scheduled for: miah WALSH Supported Employment &

## 2019-08-26 ENCOUNTER — VIRTUAL VISIT (OUTPATIENT)
Dept: PSYCHIATRY | Facility: CLINIC | Age: 29
End: 2019-08-26
Payer: COMMERCIAL

## 2019-08-26 DIAGNOSIS — F31.11 BIPOLAR 1 DISORDER, MANIC, MILD (H): ICD-10-CM

## 2019-08-26 DIAGNOSIS — F25.0 SCHIZOAFFECTIVE DISORDER, BIPOLAR TYPE (H): Primary | ICD-10-CM

## 2019-08-27 ASSESSMENT — PATIENT HEALTH QUESTIONNAIRE - PHQ9: SUM OF ALL RESPONSES TO PHQ QUESTIONS 1-9: 5

## 2019-08-27 NOTE — TELEPHONE ENCOUNTER
Requested Prescriptions   Pending Prescriptions Disp Refills     gabapentin (NEURONTIN) 300 MG capsule [Pharmacy Med Name: GABAPENTIN 300MG CAPS] 90 capsule 0     Sig: TAKE TWO CAPSULES BY MOUTH THREE TIMES A DAY  Last Written Prescription Date:  08/14/2019 #90 x 0  Last filled 07/22/2019  Last office visit: 8/2/2019 SAADIA Avery  Future Office Visit:  None         There is no refill protocol information for this order

## 2019-08-28 RX ORDER — GABAPENTIN 300 MG/1
CAPSULE ORAL
Qty: 90 CAPSULE | Refills: 0 | Status: SHIPPED | OUTPATIENT
Start: 2019-08-28 | End: 2019-10-09

## 2019-08-28 ASSESSMENT — ANXIETY QUESTIONNAIRES: GAD7 TOTAL SCORE: 6

## 2019-08-29 NOTE — PROGRESS NOTES
NAVIGATE SEE Progress Note   For Supported Employment & Education    NAVIGATE Enrollee: Ayana Prasad (1990)     MRN: [id number]  Date:  8/26/19  Clinician: NAVIGATE Supported Employment & , Linh Hamilton    1. Client Status Update:   Ayana Prasad is interested in employment (Client had in person contact with potential employer)    2. People present:   SEE/Writer  Client: Ayana Prasad    3. Length of Actual Contact: 17 minutes   Traveled? No    4. Location of contact:  Telephone    5. Brief description of session, contact, or client status (include: strategies, interventions, client reaction to contact, next steps, etc)    Writer called Ayana at her request for a letter of documentation for her online masters program. She requested specific accommodations and reports that she will need to re-take a course this fall that she failed due to not having enough time for her assignments. Ayana is currently staying with her parents in [place], [place] and will have more time to focus on her wellbeing and school. Ayana reports she has been taking her medications and asked if she could send a My Chart message to Dr. Hamilton. I said she could and that she/a nurse would follow up. 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 She agreed to start working there but only wants part time since its such a far drive and her parents need to drop her off and pick her up. Encouraged her to schedule phone consult with JULISA Ring if she has concerns.     6. Completion of mutually agreed upon client task from previous meeting:  Not Applicable    7. Orientation and Treatment Planning:  Pursuing current SEE goals    8. Assessment:  Assessing client's need for follow-along supports    9. Placement:  Employment  (Interview preparation/skills training)    10. Follow Along Supports: (for clients who are working or attending school)   Employment  (Reviewing stress  management for work)    11. Mutually agreed upon client task for next meeting:     na    12. Next Meeting Scheduled for: [facility] Supported [facility] &
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
## 2019-09-06 ENCOUNTER — OFFICE VISIT (OUTPATIENT)
Dept: PSYCHIATRY | Facility: CLINIC | Age: 29
End: 2019-09-06
Payer: COMMERCIAL

## 2019-09-06 DIAGNOSIS — F25.0 SCHIZOAFFECTIVE DISORDER, BIPOLAR TYPE (H): Primary | ICD-10-CM

## 2019-09-06 NOTE — PROGRESS NOTES
"NAVIGATE SEE Progress Note   For Supported Employment & Education    NAVIGATE Enrollee: Ayana Prasad (1990)     MRN: [id number]  Date:  9/06/19  Clinician: NAVIGATE Supported Employment & , Linh Hamilton    1. Client Status Update:   Ayana Prasad is interested in education (Client continuing a class or school program) and employment (Client stopped competitive employment)    2. People present:   SEE/Writer  Client: Ayana Prasad    3. Length of Actual Contact: 45 minutes   Traveled? No    4. Location of contact:  Psychiatry Clinic, [place]    5. Brief description of session, contact, or client status (include: strategies, interventions, client reaction to contact, next steps, etc)    Writer and Ayana Prasad met at the [facility] clinic for a follow up Supported Employment and Education (SEE) session. Ayana Prasad has been working on the goal of obtaining a job and completing her masters. Today's agenda included: checking in on job search and school accommodations.     Ayana has recently returned from a 3 week trip to [place], [place] where she was staying with her parents. She returned to her home in [place] and is planning on moving into a 2 br house in [place] with her wife and 3 children. Ayana does not feel like she is able to work right now, but is \"feeling pressure from the other side. My wife, my mother in law, my parents and my sister in law all want me to contribute and work\". We discussed how potentially attending day treatment is like an investment to her career so she doesn't feel like she is bouncing around jobs consistently and can learn skills to maintain employment.    Ayana will think about having her family attend a tx planning meeting on a Friday. She hopes to educate her wife on supports that she needs and to help her family understand her symptoms.     Ayana will email this writer with her school contact to send a letter of documentation.       6. Completion of " mutually agreed upon client task from previous meeting:  Completed    7. Orientation and Treatment Planning:  Pursuing current SEE goals    8. Assessment:  Assessing client's need for follow-along supports    9. Placement:  Not Applicable    10. Follow Along Supports: (for clients who are working or attending school)   Education (Assisting with requesting accommodations)  Employment  (Assisting with requesting accommodations)    11. Mutually agreed upon client task for next meeting:     Send letter/info for school     12. Next Meeting Scheduled for: next week kim GANATE Supported Employment &

## 2019-09-09 ASSESSMENT — ANXIETY QUESTIONNAIRES
4. TROUBLE RELAXING: NEARLY EVERY DAY
GAD7 TOTAL SCORE: 11
6. BECOMING EASILY ANNOYED OR IRRITABLE: NOT AT ALL
1. FEELING NERVOUS, ANXIOUS, OR ON EDGE: MORE THAN HALF THE DAYS
7. FEELING AFRAID AS IF SOMETHING AWFUL MIGHT HAPPEN: NOT AT ALL
5. BEING SO RESTLESS THAT IT IS HARD TO SIT STILL: MORE THAN HALF THE DAYS
2. NOT BEING ABLE TO STOP OR CONTROL WORRYING: MORE THAN HALF THE DAYS
3. WORRYING TOO MUCH ABOUT DIFFERENT THINGS: MORE THAN HALF THE DAYS

## 2019-09-09 ASSESSMENT — PATIENT HEALTH QUESTIONNAIRE - PHQ9: SUM OF ALL RESPONSES TO PHQ QUESTIONS 1-9: 5

## 2019-09-10 ASSESSMENT — ANXIETY QUESTIONNAIRES: GAD7 TOTAL SCORE: 11

## 2019-09-13 ENCOUNTER — OFFICE VISIT (OUTPATIENT)
Dept: PSYCHIATRY | Facility: CLINIC | Age: 29
End: 2019-09-13
Payer: COMMERCIAL

## 2019-09-13 DIAGNOSIS — F31.11 BIPOLAR 1 DISORDER, MANIC, MILD (H): ICD-10-CM

## 2019-09-13 DIAGNOSIS — Z72.0 TOBACCO ABUSE: ICD-10-CM

## 2019-09-13 DIAGNOSIS — F25.0 SCHIZOAFFECTIVE DISORDER, BIPOLAR TYPE (H): ICD-10-CM

## 2019-09-13 DIAGNOSIS — F25.0 SCHIZOAFFECTIVE DISORDER, BIPOLAR TYPE (H): Primary | ICD-10-CM

## 2019-09-13 RX ORDER — FLUPHENAZINE HYDROCHLORIDE 1 MG/1
1 TABLET ORAL 2 TIMES DAILY
Qty: 60 TABLET | Refills: 0 | Status: SHIPPED | OUTPATIENT
Start: 2019-09-13 | End: 2019-09-16

## 2019-09-13 ASSESSMENT — PATIENT HEALTH QUESTIONNAIRE - PHQ9: SUM OF ALL RESPONSES TO PHQ QUESTIONS 1-9: 11

## 2019-09-13 NOTE — TELEPHONE ENCOUNTER
Deena Ascencio, [facility] HospitalEstefania Tomlinson RN             Ayana Villa told me she took her last of Prolixin, Lithium, and Ativan and needs refills in all 3. Can you please assist?     Thanks,   Deena

## 2019-09-13 NOTE — TELEPHONE ENCOUNTER
Last seen: 8/14/19  RTC: 8 weeks  Cancel: none  No-show: none  Next appt: 9/16/19    Incoming refill from Patient via appointment today     Medication requested: Prolixin 1 mg   Directions: Take 1 tablet by mouth 2 times daily   Qty: 60  Last refilled: 8/14/19    Medication requested: Lithium  Directions: Take 300 mg in the morning and 600 mg in the pm  Qty: 90  Last refilled: 7/29/19 with six refills     Medication requested: Lorazpeam 1 mg   Directions: Take 1 tablet by mouth 2 times daily as needed for anxiety  Qty: 30  Last refilled: 8/7/19      Filled prolixin, will route Ativan to Dr. Hamilton and lithium should have refills currently

## 2019-09-14 NOTE — PROGRESS NOTES
NAVIGSERGIO Clinician Contact & Progress Note  For Individual Resiliency Training (IRT)  A Part of the [facility] First Episode of Psychosis Program    NAVIGATE Enrollee: Ayana Prasad (1990)     MRN: [id number]  Date:  8/15/19  Diagnosis: Schizoaffective disorder, bipolar type (F25.0)  Clinician: JILLIAN Individual Resiliency Trainer, JULISA Ring     1. Type of contact: (majority of time spent)  IRT Session    2. People present:   Writer  Client: Ayana Prasad    3. Length of Actual Contact: Start Time: 2:00; End Time: 3:00   Traveled?    No     4. Location of contact:  Psychiatry Clinic, [place]    5. Did the client complete the home practice option(s) from the previous session: Completed    6. Motivational Teaching Strategies:  Connect info and skills with personal goals  Promote hope and positive expectations  Explore pros and cons of change  Re-frame experiences in positive light    7. Educational Teaching Strategies:  Review of written material/education  Relate information to client's experience  Ask questions to check comprehension  Break down information into small chunks  Adopt client's language     8. CBT Teaching Strategies:  Reinforcement and shaping (positive feedback for steps towards goals, gains in knowledge & skills and follow-through on home assignments)  Relapse prevention planning (early warning signs)  Coping skills training (review current coping skills, increase currently used skills and plan home practice)    9. IRT Module(s) Addressed:  Module 3 - Education about Psychosis  Module 4 - Relapse Prevention Planning  Module 8 - Dealing with Negative Feelings    10. Techniques utilized:   [place] announced at beginning of session  Review of homework  Review of goal  Review of previous meeting  Present new material  Problem-solving practice  Help client choose a home practice option  Summarize progress made in current session    11. Mental Status Exam:    Alertness: alert  and  oriented  Appearance: casually groomed  Behavior/Demeanor: cooperative and pleasant, with good  eye contact   Speech: normal and regular rate and rhythm  Language: intact. Preferred language identified as English.  Psychomotor: restless and fidgety  Mood: anxious  Affect: restricted; was congruent to mood; was congruent to content  Thought Process/Associations: unremarkable  Thought Content:  Reports preoccupations;  Denies suicidal ideation and violent ideation  Perception:  Reports auditory hallucinations;  Denies none  Insight: fair  Judgment: fair  Cognition: does  appear grossly intact; formal cognitive testing was not done  Suicidal ideation: denies SI, denies intent,  and denies plan  Homicidal Ideation: denies    12. Assessment/Progress Note:     This writer met with Ayana for a follow-up IRT session. Ayana stated she will be leaving to visit her family in [facility] tomorrow, coming back October 1st. A portion of the session was spent problem solving strategies for Ayana to obtain medication refills and set up ongoing appointments due to the distance. Ayana reported the following symptoms: auditory hallucinations, anxiety/ruminations, difficulties with concentration, and anhedonia. She mentioned the voices have been louder than typical. She noted she did not take medications Sunday to Wednesday this past week, but did not notice any changes. This writer encouraged Ayana to take medications as prescribed and then discuss concerns with her prescriber; she agreed. She noted stopping medication has historically been a warning sign for a relapse. She stated she had a significant stressor lately, as a family member voiced disapproval of her marriage. This writer validated her emotions of betrayal, disappointment, sadness. Following this, Ayana and this writer brainstormed helpful coping mechanisms, including being outdoors (fishing, going on a walk, visiting the park, deep breathing, talking with supportive  "family members. Ayana agreed to try these strategies throughout the upcoming week.     13. Plan/Referrals:     This writer will continue to utilize CBT, problem solving, and psycho-education methods to assist Ayana in meeting her goals of improving relationships, obtaining a job, and decreasing anxiety.     Billing for \"Interactive Complexity\"?    No      JULISA Ring    NAVIGATE Individual Resiliency Trainer    Attestation:    I did not see this patient directly. This patient is discussed with me in individual clinical social work supervision, and I agree with the plan as documented.     JULISA Renteria, MSW, LICSW, September 20, 2019    "

## 2019-09-16 ENCOUNTER — OFFICE VISIT (OUTPATIENT)
Dept: PSYCHIATRY | Facility: CLINIC | Age: 29
End: 2019-09-16
Payer: COMMERCIAL

## 2019-09-16 VITALS
SYSTOLIC BLOOD PRESSURE: 119 MMHG | HEART RATE: 97 BPM | BODY MASS INDEX: 31.48 KG/M2 | RESPIRATION RATE: 16 BRPM | WEIGHT: 201 LBS | DIASTOLIC BLOOD PRESSURE: 83 MMHG

## 2019-09-16 DIAGNOSIS — F31.11 BIPOLAR 1 DISORDER, MANIC, MILD (H): ICD-10-CM

## 2019-09-16 DIAGNOSIS — F25.0 SCHIZOAFFECTIVE DISORDER, BIPOLAR TYPE (H): ICD-10-CM

## 2019-09-16 DIAGNOSIS — F25.0 SCHIZOAFFECTIVE DISORDER, BIPOLAR TYPE (H): Primary | ICD-10-CM

## 2019-09-16 DIAGNOSIS — Z72.0 TOBACCO ABUSE: ICD-10-CM

## 2019-09-16 RX ORDER — LORAZEPAM 1 MG/1
1 TABLET ORAL 2 TIMES DAILY PRN
Qty: 30 TABLET | Refills: 0 | OUTPATIENT
Start: 2019-09-16

## 2019-09-16 RX ORDER — TRAZODONE HYDROCHLORIDE 50 MG/1
50 TABLET, FILM COATED ORAL
Qty: 30 TABLET | Refills: 0 | Status: SHIPPED | OUTPATIENT
Start: 2019-09-16 | End: 2019-10-22

## 2019-09-16 RX ORDER — FLUPHENAZINE HYDROCHLORIDE 1 MG/1
1 TABLET ORAL 2 TIMES DAILY
Qty: 60 TABLET | Refills: 0 | Status: ON HOLD | OUTPATIENT
Start: 2019-09-16 | End: 2019-12-13

## 2019-09-16 RX ORDER — LORAZEPAM 1 MG/1
1 TABLET ORAL 2 TIMES DAILY PRN
Qty: 30 TABLET | Refills: 0 | Status: SHIPPED | OUTPATIENT
Start: 2019-09-16 | End: 2019-09-16

## 2019-09-16 RX ORDER — LORAZEPAM 1 MG/1
1 TABLET ORAL 2 TIMES DAILY PRN
Qty: 30 TABLET | Refills: 0 | Status: SHIPPED | OUTPATIENT
Start: 2019-09-16 | End: 2019-10-04

## 2019-09-16 RX ORDER — FLUPHENAZINE HYDROCHLORIDE 1 MG/1
1 TABLET ORAL 2 TIMES DAILY
Qty: 60 TABLET | Refills: 0 | Status: CANCELLED | OUTPATIENT
Start: 2019-09-16

## 2019-09-16 RX ORDER — LITHIUM CARBONATE 300 MG/1
TABLET, FILM COATED, EXTENDED RELEASE ORAL
Qty: 90 TABLET | Refills: 0 | Status: ON HOLD | OUTPATIENT
Start: 2019-09-16 | End: 2019-12-13

## 2019-09-16 ASSESSMENT — PAIN SCALES - GENERAL: PAINLEVEL: NO PAIN (0)

## 2019-09-16 NOTE — TELEPHONE ENCOUNTER
Dr. Hamilton from [facility] called today to speak with Becki Avery. Becki was in a meeting. Patient has been out of Ativan, Prolixin and Lithobid for 3 days. Authorization for Dr. Hamilton to order medications has not been completed. Patient needs 30 day refills of these medications as soon as possible. Dr. Hamilton also shared that she was a temporary provider at [facility]. There will be a new psychiatrist starting in October. Patient needs phone call to let her know when this is done. Refills and pharmacy pended for provider review.   April Price, ASHLYN

## 2019-09-16 NOTE — PROGRESS NOTES
"NAVIGATE New Medication Management Visit  A Part of the [facility] First Episode of Psychosis Program    NAVIGATE Enrollee: Ayana Prasad (1990)     MRN: [id number]  Date:  9/16/19    The initial diagnostic evaluation was on  5/23/19.         Contributors to the Assessment     Chart Reviewed.   Interview completed with Ayana Prasad.  Collateral information obtained from  Ayana Lane, Navigate team, and Linh Howell's DA dated 5/23/19.         Identification     Ayana Prasad is a 29 year old female who was last seen by me  in clinic  In August at which time pt reported continued anxiety. Her recent  seizure like  episodes  were assessed by Neurology and seizures were ruled out and she was diagnosed to have psychogenic seizures.    The patient reports adequate treatment adherence.  History was provided by Ayana  who was a limited historian.         Chief Complaint      \" I Stopped meds three days ago as I ran out\"    Interim History        Patient reports she has continued to be anxious and it has affected her appetite and she has been eating poorly.She reports her auditory hallucinations are worsening and she is hearing command ahallucinations directing her to harm herself through out the day. She notes she has been using her coping strategies to distract herself and stay safe. She ntes her meds were not refilled as she id not alert her PCP Becki Avery who has been restricted to be the only prescriber.  At this time the patient agreed for me to place a call to her provider Becki Avery.  We were unable to talk to Becki cruz CMA however spoke to us reported that Becki had added 's name to the list of the providers and sent a request to the insurance to provide her privileges to prescribe  Ayana Lane's medications.  I requested that Becki refill Nydias medications for the next month while the prescriber list is being updated.  Ayana told me that she had missed Prolixin Ativan and lithium and she was " feeling on the edge.  Ayana is scheduled for a day treatment intake at 1 PM tomorrow.  Estefania Armijo was going to follow up with Valentino Acosta with  our recommendations and support for day treatment for Ayana.  At this time we are not aware of her acceptance or a start date for day treatment at the [facility].    Ayana was open to working on regular  meals and seeking help if her anxiety or hallucinations  worsen.    It is a little unclear to me as to what Nydias medication should be.  Some of her inpatient medications are being continued by Becki Avery.  We would need to make further recommendations to the insurance company about having the psychiatrist manage Ayana's medications as her history and illness are pretty complicated.  With Becki Avery managing her medications it is unclear if she is also monitoring the labs.  I have spoken to the [employer] staff and they would like us to call at [phone] to change the prescriber for pscy meds.     Past medication trials:   Note: per recent hospital records, Juan Pablo is a restricted recipient with her insurer [employer]. Her restricted pharmacy is [facility].  [employer] Restricted Recipient line ([phone])     Hospitalizations:   Total of approx 10 hospitalizations in the [facility] system for SI and AH in context of LEVINE. H/o extensive SIB Violent behavior: Yes - hx of arrests for aggression toward authorities   Commitment: No, Current Ashley order: No  Note: Shanice has a hx of commitment in 2017.    ECT trials: No  Outpatient Programs & Services [Psychotherapy, DBT, Day Treatment, Eating Disorder Tx etc]: Per FEP Screening:            Substance Use History: (review [facility])      Caffeine: no caffeine        Tobacco: current, chewing tobacco Cannabis: current          ETOH: past            First Episode of Psychosis History      DUP (duration untreated psychosis): 3 yrs in the context of substance use  Route to initial care: IP   Medication  adherence overall:  Fair self DC'ed meds yesterday  General frequency of visits:  monthly  Participation in groups:  no  Cognitive Remediation: no  Other treatment history: see DA    Reviewed for completion of First Episode work-up:  Yes  First episode workup:  Done (if completed, see LABS for results)  MATRICS Consensus Cognitive Battery:  Not Done (if completed, see LABS for results)         Medical/Surgical History     Haldol [haloperidol]; Seroquel [quetiapine]; Adhesive tape; Percocet [oxycodone-acetaminophen]; Prednisone; Risperidone; Tramadol hcl; and Droperidol    Patient Active Problem List   Diagnosis     ADHD (attention deficit hyperactivity disorder)     Bipolar 1 disorder, manic, mild     Marijuana abuse     Polysubstance abuse (H)     GERD (gastroesophageal reflux disease)     Tobacco abuse     Abdominal pain, right upper quadrant     Intractable back pain     Optic neuritis     Cauda equina syndrome with neurogenic bladder (H)     Schizoaffective disorder, bipolar type (H)     PTSD (post-traumatic stress disorder)     Anxiety     Auditory hallucinations     Class 2 obesity due to excess calories in adult     Nephrolithiasis     Cyst of left ovary     Borderline personality disorder (H)     Cannabis dependence (H)     Depression     Episodic mood disorder (H)     History of heroin abuse     Moderate episode of recurrent major depressive disorder (H)     Opioid use disorder, severe, dependence (H)     Substance-induced psychotic disorder with hallucinations (H)     History of methamphetamine abuse     Nausea     Overdose     Suicidal ideation     Bella (H)     Spell of altered consciousness     Urinary retention            Medications     Current Outpatient Medications   Medication Sig Dispense Refill     Amino Acids (AMINO ACID PO) Take 1 capsule by mouth daily Unknown formulation and dose.       eszopiclone (LUNESTA) 2 MG tablet Take 1 tablet (2 mg) by mouth nightly as needed for sleep 14 tablet 0      fluPHENAZine (PROLIXIN) 1 MG tablet Take 1 tablet (1 mg) by mouth 2 times daily 60 tablet 0     fluPHENAZine decanoate (PROLIXIN) 25 MG/ML injection Inject 0.1 mLs (2.5 mg) into the muscle every 21 days 5 mL 0     gabapentin (NEURONTIN) 300 MG capsule TAKE TWO CAPSULES BY MOUTH THREE TIMES A DAY 90 capsule 0     gabapentin (NEURONTIN) 300 MG capsule Take 2 capsules (600 mg) by mouth 3 times daily 90 capsule 0     lithium ER (LITHOBID) 300 MG CR tablet Take 1 tab, 300 mg, in the a.m. and 2 tabs, 600 mg in the p.m. 90 tablet 6     LORazepam (ATIVAN) 1 MG tablet Take 1 tablet (1 mg) by mouth 2 times daily as needed for anxiety 30 tablet 0     nicotine (NICODERM CQ) 21 MG/24HR 24 hr patch Place 1 patch onto the skin daily 30 patch 1     nicotine polacrilex (NICORETTE) 4 MG gum Place 1 each (4 mg) inside cheek every hour as needed for other (nicotine withdrawal symptoms) 100 tablet 1     OLANZapine (ZYPREXA) 10 MG tablet Take 1 tablet (10 mg) by mouth daily as needed (Agitation) 30 tablet 0     ondansetron (ZOFRAN) 4 MG tablet Take 1 tablet (4 mg) by mouth every 8 hours as needed for nausea 30 tablet 1     PREBIOTIC PRODUCT PO Take 1 capsule by mouth daily Unknown formulation and dose.       traZODone (DESYREL) 50 MG tablet Take 1 tablet (50 mg) by mouth nightly as needed for sleep 14 tablet 0             Vitals     /83   Pulse 97   Resp 16   Wt 91.2 kg (201 lb)   BMI 31.48 kg/m        Weight prior to medication:   n/a         Mental Status Exam     Alertness: alert   Appearance: adequately groomed  Behavior/Demeanor: passive, with fair  eye contact   Speech: increased latency of response  Language: good  Psychomotor: slowed  Mood: anxious  Affect: restricted; was congruent to mood; was congruent to content  Thought Process/Associations: tangential  Thought Content:  Reports none;  Denies suicidal ideation with plan; with intent , violent ideation without plan; without intent  and obsessions   Perception:   Reports command hallucinations directing her to kill herself. Has not plan intent or timeline. Denies depersonalization and derealization  Insight: limited  Judgment: fair  Cognition: does  appear grossly intact; formal cognitive testing was not done         Labs and Data     RATING SCALES:  PHQ9 and AVA-7    PHQ9 TODAY =1 6 AVA-7 n/a  PHQ-9 SCORE 8/15/2019 9/9/2019 9/13/2019   PHQ-9 Total Score - - -   PHQ-9 Total Score 5 5 11       ANTIPSYCHOTIC LABS ROUTINE    [glu, A1C, lipids (focus LDL), liver enzymes, WBC, ANEU, Hgb, plts]   q12 mo  Recent Labs   Lab Test 06/29/19  0718 06/28/19  0705  11/05/18  1642  10/03/17  1226   * 98   < >  --    < > 110*   A1C  --   --   --  5.8*  --  5.8    < > = values in this interval not displayed.     Recent Labs   Lab Test 10/03/17  1226 05/13/17  0752   CHOL 179 149   TRIG 246* 78   LDL 80 68   HDL 50 65     Recent Labs   Lab Test 06/26/19 2027 06/04/19  1219   AST Unsatisfactory specimen - hemolyzed 24   ALT 40 25   ALKPHOS 54 75     Recent Labs   Lab Test 06/28/19  0705 06/27/19  0620 06/26/19 2027 06/04/19  1219   WBC 10.6 11.2* 12.2* 8.8   ANEU  --   --  8.3 5.3   HGB 14.0 12.6 12.6 14.9    365 379 337            Psychiatric Diagnoses     Schizoaffective disorder bipolar type  Posttraumatic stress disorder  Social anxiety disorder  Borderline personality disorder  Tobacco use disorder  History of traumatic brain injury --  has had concussions from hockey in the past.          Assessment     Ayana Prasad is a 28 year old   female with a psychiatry history of psychiatric diagnoses noted above who presents for med follow up. She has a history of at least 4 psychiatric hospitalization(s) in 2016, 2017, 2018 and most recently in April 2019.  Family history is unremarkable per Ayana.     Today, Ayana presents reporting that she has discontinued her medications as she ran out.  Her history is complicated and her medication compliance is  questionable.  Also since Becki BARAHONA at [facility] is the prescriber for her psychiatric medications we are unable to clarify on her compliance.  I am concerned about her current medications and how they are being managed and the monitoring for all of them given the her treatment team is unable to manage her psychiatric medications.  Her family and home situation continues to be as chronic stressor and hopefully it will resolve in the near future given the changes in the family structure and housing arrangements.    Safety will continue to be a concern for Ayana given her psychiatric illness and family stressors and intermittent compliance with medications.  This may also be complicated by substance use; there is no evidence for it right now.  Recommendations have been made for day treatment intake and possibly day treatment admission in the next couple of weeks.  It would be an ideal time recommended to re examine her illness/DX, symptoms and medication needs during her stay at the day treatment.  Further if her medication management and prescription refills are transferred to her navigate provider it would further help us manage Shanice Piña better in future.  Ayana will benefit from being on an injectable medication.  Ayana continues to complain that her ADHD symptoms may be playing into her functional impairment that will need to be looked into in future.    Seizure disorder has been ruled out for her there are multiple other medical concerns that are being looked into and managed by her other providers.  At this time there is no imminent danger but Ayana poses a chronic danger to herself.  Safety will always be a concern for her.  She may benefit from DBT interventions in future.      Identified risk factors and/or vulnerabilities include mood disorder with psychosis/paranoia, past serious attempts - especially recent, auditory hallucinations urging suicide and hopelessness, worthlessness.  Protective factors and/or strengths identified as educated, has a previous history of therapy, intelligent, open to learning, open to suggestions / feedback, support of family, friends and providers, wants to learn and willing to ask questions. Suicidal ideation was not present at the time of today's visit. Safety plan was discussed and included reaching out to wife/family, using crisis resources, calling 9-1-1 and visiting the nearest ED with safety concerns for self or others.      Ayana agrees to treatment with the capacity to do so. Agrees to call clinic for any problems. The patient understands to call 911 or come to the nearest ED if life threatening or urgent symptoms present.    RISK continues give patients difficulties with SI , SIB  And substance use.  [place] PRESCRIPTION MONITORING PROGRAM [] was not checked today:  will be checked next visit.    PSYCHOTROPIC DRUG INTERACTIONS:   possible.  MANAGEMENT:  Monitoring for adverse effects, minimal use of [ativan], using lowest therapeutic dose of [zyprexa], tapering off of [lunesta/trazodone] and patient is aware of risks         Plan     1) PSYCHOTROPIC MEDICATIONS: Placed a call to Becki Avery to refill her prescriptions for Prolixin, lithium and ativan for one month.  Encouraged to restart meds tonight and to discontinue the PRN meds.  Meds refilled for one more month      2) THERAPY:  Continue    3) NEXT DUE:    Labs- N/A  Rating Scales- see above    4) REFERRALS:  Day treatment.  Navigate Team/ services    5) RTC:  3 weeks or earlier as needed.    6) CRISIS NUMBERS:   Provided routinely in AVS.      TREATMENT RISK STATEMENT:  The risks, benefits, alternatives and potential adverse effects have been discussed and are understood by the pt. The pt understands the risks of using street drugs or alcohol. There are no medical contraindications, the pt agrees to treatment with the ability to do so. The pt knows to call the clinic for any problems or to access  emergency care if needed.  Medical and substance use concerns are documented above.  Psychotropic drug interaction check was done, including changes made today.      PROVIDER:  Miladis Hamilton MD

## 2019-09-17 ENCOUNTER — TELEPHONE (OUTPATIENT)
Dept: PSYCHIATRY | Facility: CLINIC | Age: 29
End: 2019-09-17

## 2019-09-17 ASSESSMENT — PATIENT HEALTH QUESTIONNAIRE - PHQ9: SUM OF ALL RESPONSES TO PHQ QUESTIONS 1-9: 16

## 2019-09-17 NOTE — TELEPHONE ENCOUNTER
T/C to client's wife to inquire about her interest in attending NAVIGATE'S family education program. LVM and requested a call back.

## 2019-09-18 NOTE — PROGRESS NOTES
"NAVIGATE SEE Progress Note   For Supported Employment & Education    NAVIGATE Enrollee: Ayana Prasad (1990)     MRN: [id number]  Date:  9/13/19  Clinician: NAVIGATE Supported Employment & , Linh Hamilton    1. Client Status Update:   Ayana Prasad is interested in employment (Client developed employement goals)    2. People present:   SEE/Writer  Client: Ayana Prasad    3. Length of Actual Contact: 35 minutes   Traveled? No    4. Location of contact:  Psychiat[facility], [place]    5. Brief description of session, contact, or client status (include: strategies, interventions, client reaction to contact, next steps, etc)    Writer and Ayana Prasad met at the [facility] clinic for a follow up Supported Employment and Education (SEE) session. Ayana Prasad has been working on the goal of obtaining employment and/or organizing her day with appropriate activities Today's agenda included: checking in  Ayana will have an evaluation completed for day treatment this Tuesday and reports that she feels \"like its time I get some help. The last week has been really bad. Like my mental health is really bad. I'm not eating or sleeping and even coming to appts is really hard'.   Ayana reports she is a little bit ahead in her school work but has not had much energy to complete anything this week.   Ayana also requested this writer send a letter to [email or web address] that documents her disabilities. Writer sent after our appointment.   Ayana would like her job search to be on hold for the time being and focus on her mental health. Will check in next week after intake into day treatment.       6. Completion of mutually agreed upon client task from previous meeting:  Partially Completed    7. Orientation and Treatment Planning:  Pursuing current SEE goals    8. Assessment:  Assessing client's need for follow-along supports    9. Placement:  Not Applicable    10. Follow Along Supports: (for clients who are " working or attending school)   Education (Assisting with requesting accommodations)    11. Mutually agreed upon client task for next meeting:     na    12. Next Meeting Scheduled for: next week bo [facility] Supported Employment &

## 2019-09-19 ENCOUNTER — ALLIED HEALTH/NURSE VISIT (OUTPATIENT)
Dept: PSYCHIATRY | Facility: CLINIC | Age: 29
End: 2019-09-19
Payer: COMMERCIAL

## 2019-09-19 DIAGNOSIS — F25.0 SCHIZOAFFECTIVE DISORDER, BIPOLAR TYPE (H): Primary | ICD-10-CM

## 2019-09-20 ENCOUNTER — OFFICE VISIT (OUTPATIENT)
Dept: PSYCHIATRY | Facility: CLINIC | Age: 29
End: 2019-09-20
Payer: COMMERCIAL

## 2019-09-20 DIAGNOSIS — F25.0 SCHIZOAFFECTIVE DISORDER, BIPOLAR TYPE (H): Primary | ICD-10-CM

## 2019-09-20 ASSESSMENT — ANXIETY QUESTIONNAIRES
7. FEELING AFRAID AS IF SOMETHING AWFUL MIGHT HAPPEN: NOT AT ALL
6. BECOMING EASILY ANNOYED OR IRRITABLE: NOT AT ALL
2. NOT BEING ABLE TO STOP OR CONTROL WORRYING: MORE THAN HALF THE DAYS
5. BEING SO RESTLESS THAT IT IS HARD TO SIT STILL: MORE THAN HALF THE DAYS
1. FEELING NERVOUS, ANXIOUS, OR ON EDGE: MORE THAN HALF THE DAYS
GAD7 TOTAL SCORE: 10
3. WORRYING TOO MUCH ABOUT DIFFERENT THINGS: MORE THAN HALF THE DAYS

## 2019-09-20 ASSESSMENT — PATIENT HEALTH QUESTIONNAIRE - PHQ9
SUM OF ALL RESPONSES TO PHQ QUESTIONS 1-9: 17
5. POOR APPETITE OR OVEREATING: MORE THAN HALF THE DAYS

## 2019-09-21 ASSESSMENT — ANXIETY QUESTIONNAIRES: GAD7 TOTAL SCORE: 10

## 2019-09-22 ENCOUNTER — MYC MEDICAL ADVICE (OUTPATIENT)
Dept: FAMILY MEDICINE | Facility: CLINIC | Age: 29
End: 2019-09-22

## 2019-09-22 DIAGNOSIS — F31.11 BIPOLAR 1 DISORDER, MANIC, MILD (H): ICD-10-CM

## 2019-09-23 RX ORDER — GABAPENTIN 300 MG/1
600 CAPSULE ORAL 3 TIMES DAILY
Qty: 90 CAPSULE | Refills: 2 | Status: ON HOLD | OUTPATIENT
Start: 2019-09-23 | End: 2019-12-13

## 2019-09-23 NOTE — PROGRESS NOTES
"NAVIGATE SEE Progress Note   For Supported Employment & Education    NAVIGATE Enrollee: Ayana Prasad (1990)     MRN: [id number]  Date:  9/19/19  [facility] , Linh Hamilton    1. Client Status Update:   Ayana Prasad is interested in employment (Client developed employement goals)    2. People present:   SEE/Writer  Client: Ayana Prasad    3. Length of Actual Contact: 60 minutes   Traveled? Yes  Total Travel Time: 45 minutes    4. Location of contact:  Other: [employer]    5. Brief description of session, contact, or client status (include: strategies, interventions, client reaction to contact, next steps, etc)    Writer and Ayana Prasad met at a [employer] shop for a follow up Supported Employment and Education (SEE) session. Ayana Prasad has been working on the goal of obtaining a job. Today's agenda included: exploring pros and cons of working, setting schedule plan of activities, checking on S SI application and reviewing job postings.   Ayana was tearful throughout the appointment and mentioned a few times during the session that she \"isn't sure how things are going to turn out. I'm not doing well. Having trouble sleeping, eating and my wife really wants me to be working right now\". We discussed pros and cons as well as [facility]. Ayana agreed to reschedule her day treatment DA and called her  about the status of her application for disability. The person on the phone said they didn't have an answer yet about if she was approved. We reviewed job postings online that fit her ideal conditions of employment and this writer emailed the links to the specific jobs to her.      6. Completion of mutually agreed upon client task from previous meeting:  Partially Completed    7. Orientation and Treatment Planning:  Pursuing current SEE goals    8. Assessment:  Assessing client's " "need for follow-along supports    9. Placement:  Employment  (Information gathering/planning re: \"benefits applications\" or \"work incentive planning\" and Job searching (Internet search))    10. Follow Along Supports: (for clients who are working or attending school)   Not Applicable    11. Mutually agreed upon client task for next meeting:     Attend day tx intake, call to schedule family session    12. Next Meeting Scheduled for: next week renato [facility] Supported Employment &   "

## 2019-09-24 ENCOUNTER — E-VISIT (OUTPATIENT)
Dept: FAMILY MEDICINE | Facility: CLINIC | Age: 29
End: 2019-09-24
Payer: COMMERCIAL

## 2019-09-24 DIAGNOSIS — T14.8XXA ABRASION: Primary | ICD-10-CM

## 2019-09-24 PROCEDURE — 99444 ZZC PHYSICIAN ONLINE EVALUATION & MANAGEMENT SERVICE: CPT | Performed by: NURSE PRACTITIONER

## 2019-09-26 ENCOUNTER — ALLIED HEALTH/NURSE VISIT (OUTPATIENT)
Dept: PSYCHIATRY | Facility: CLINIC | Age: 29
End: 2019-09-26
Payer: COMMERCIAL

## 2019-09-26 DIAGNOSIS — F25.0 SCHIZOAFFECTIVE DISORDER, BIPOLAR TYPE (H): Primary | ICD-10-CM

## 2019-09-27 ENCOUNTER — VIRTUAL VISIT (OUTPATIENT)
Dept: PSYCHIATRY | Facility: CLINIC | Age: 29
End: 2019-09-27
Payer: COMMERCIAL

## 2019-09-27 DIAGNOSIS — F25.0 SCHIZOAFFECTIVE DISORDER, BIPOLAR TYPE (H): Primary | ICD-10-CM

## 2019-09-30 ENCOUNTER — TELEPHONE (OUTPATIENT)
Dept: PSYCHIATRY | Facility: CLINIC | Age: 29
End: 2019-09-30

## 2019-09-30 DIAGNOSIS — F25.0 SCHIZOAFFECTIVE DISORDER, BIPOLAR TYPE (H): Primary | ICD-10-CM

## 2019-09-30 NOTE — PROGRESS NOTES
"NAVIGATE SEE Progress Note   For Supported Employment & Education    NAVIGATE Enrollee: Ayana Prasad (1990)     MRN: [id number]  Date:  9/26/19   Clinician: [facility] Supported Employment & , Linh Hamilton    1. Client Status Update:   Ayana Prasad is interested in employment (Client started competitive employment position)    2. People present:   SEE/Writer  Client: Ayana Prasad    3. Length of Actual Contact: 60 minutes   Traveled? Yes  Total Travel Time: 40 minutes    4. Location of contact:  Other: Coffee shop    5. Brief description of session, contact, or client status (include: strategies, interventions, client reaction to contact, next steps, etc)    Writer and Ayana Prasad met at a [employer] shop for a follow up Supported Employment and Education (SEE) session. Ayana Prasad has been working on the goal of obtaining a job. Today's agenda included: employment checklist.  Ayana informed this writer that she was offered and accepted a full time job near her home. We started the employment checklist and will continue at next session. Ayana and this writer weighed the pros and cons of working full time and also created a checklist of things to do at her new house before they can move in.   Writer encouraged Ayana to schedule with IRT and doctor to check in on medications as she has not been taking them regularly. Of note, today Ayana appeared distracted and said many times that she \"just feels ok, I'm just living life, i'm ok\". Wanted to schedule IRT and med appt for Monday, 9/30.      6. Completion of mutually agreed upon client task from previous meeting:  Completed    7. Orientation and Treatment Planning:  Pursuing current SEE goals    8. Assessment:  Assessing client's need for follow-along supports    9. Placement:  Not Applicable    10. Follow Along Supports: (for clients who are working or attending school)   Employment  (Following along job supports (Discussing or " revisiting disclosure plan))    11. Mutually agreed upon client task for next meeting:     na    12. Next Meeting Scheduled for: Monday, 9/30    Linh Hamilton  [facility] Supported Employment &

## 2019-09-30 NOTE — TELEPHONE ENCOUNTER
NAVIGATE SEE Incoming Telephone Call  For Supported Employment & Education    NAVIGATE Enrollee: Ayana Prasad (1990)     MRN: [id number]  Incoming Call Received on: 9/29  Call Received from: Ayana ADLER's response to incoming call:   SEE received text from Ayana on Sunday night asking to reschedule her appts for today. Writer called and LVM asking for clarification if she were coming in and/or to reschedule.     Linh Hamilton

## 2019-10-02 ENCOUNTER — OFFICE VISIT (OUTPATIENT)
Dept: PSYCHIATRY | Facility: CLINIC | Age: 29
End: 2019-10-02
Payer: COMMERCIAL

## 2019-10-02 DIAGNOSIS — F25.0 SCHIZOAFFECTIVE DISORDER, BIPOLAR TYPE (H): Primary | ICD-10-CM

## 2019-10-03 NOTE — PROGRESS NOTES
"NAVIGATE Clinician Contact & Progress Note   For Family Education Program    NAVIGATE Enrollee: Ayana Prasad (1990)     MRN: [id number]  Date:  10/02/19  Diagnosis(es):   Schizoaffective disorder, bipolar type  Clinician: JILLIAN Family ClinicianSterling LMFT     1. Type of contact: (majority of time spent)  Family Session    2. People present:   Writer  Client: No  Significant Other/Family/Friend:  Other: Spouse, JILLIAN Family Clinician, Sterling Jimenez MA, LMFT    3. Length of Actual Contact: Start Time: 5 pm; End Time: 6 pm   Traveled?    No     4. Location of contact:  Psychiatry Clinic, [place]    5. Did the client complete the home practice option(s) from the previous session: Not Applicable    6. Motivational Teaching Strategies:  Promote hope and positive expectations  Re-frame experiences in positive light    7. Educational Teaching Strategies:  Relate information to client's experience  Ask questions to check comprehension  Break down information into small chunks    8. CBT Teaching Strategies:  Reinforcement and shaping (positive feedback for steps towards goals and gains in knowledge & skills)    9. Psychoeducational Topic(s) Addressed:  Family Education Orientation & Tip Sheet    10. Techniques utilized:   [place] announced at beginning of session  Review of goal  Problem-solving practice    11. Assessment/Progress Note:     Reviewed NAVIGATE treatment components and associated team members. Provided an introduction to the NAVIGATE Family Program. Family  was not open to meeting weekly for family therapy appts. Rather, they agreed to meet when they time available. Dialogued about safety. Identified Ayana's SI/HI safety risk as Medium due to previous attempts but no current known ideation. Discussed safety plan to include utilization of crisis hotlines (eg Crisis Text Line (text \"LIFE\" to 69[address] or call 1-184-290-PKGV, 1-702.317.3130)), calling 9-1-1, and visiting the nearest " "ED should there be concerns for Ayana's safety or the safety of others. Discussed the \"Tip Sheet for Helping People in NAVIGATE\" and identified tips that seemed relevant to family's situation, particularly:    - Keeping expectations minimal, but don't let them all go  - Encourage but do not nag. Choose your battles  - Help your relative keep to as close to a normal routine as possible  - Don't argue with a relative over worrisome thoughts  - Continue to do enjoyable activities together    Offered hope for recovery. Praised family for their involvement and seeking services. Informed them of evidence suggesting recovery rates tend to be higher with family involvement. Emphasized the importance of self care.     Home practice identified as: consider finding the time to return to the FEP program.    Overall family seemed somewhat engaged in conversation. They did not express interest in continuing to meet for family therapy and psychoeducation. As of today's appt their insight into Ayana's mental illness appears adequate. They seem they would benefit from continued clinical intervention aimed at assisting them implement helpful strategies at home and increase their understanding of psychosis.     12. Plan/Referrals:     Will attempt to meet with family at least one time per month or as schedule allows for evidence based family psychoeducation and therapeutic support aimed at maximizing Ayana's opportunity for recovery from psychosis. Ayana's spouse indicated that she doesn't have much time to make it to regular appointments. She also indicated that she and Ayana and her children will soon be moving to [place] to live in a home together.        NATHAN Wong   NAVIGATE     Attestation:    I did not see this patient directly. This patient is discussed with me in individual clinical social work supervision, and I agree with the plan as documented.     Linh Howell, LICSW, MSW, LICSW, November 6, " 2019

## 2019-10-03 NOTE — PROGRESS NOTES
dmeS: 28-year-old female presents for evaluation of possible right fractured hand.  2-hour ago 2 hours ago she was moving furniture at home and her hand caught between the wall and the bed frame.  She will reports pain in the right ulnar hand and ring and pinky finger.  She is supposed to have a tendon cyst excision in 2 weeks over the volar ring finger.  She says she had a prior proximal boxer's fracture ulnar side of hand about 2014.  She is right-handed.  No numbness or tingling.  No fever.       Allergies   Allergen Reactions     Haldol [Haloperidol] Other (See Comments)     Makes patient very angry and anxious     Seroquel [Quetiapine] Palpitations     Spent 2 weeks in the hospital due to having seroquel, caused palpitations and QT prolongation     Zyprexa [Olanzapine] Other (See Comments)     Makes patient incredibly agitated and anxious     Adhesive Tape Hives     Percocet [Oxycodone-Acetaminophen] Nausea and Vomiting     Prednisone Other (See Comments) and Hives     Suicidal ideation     Risperidone Other (See Comments)     Droperidol Anxiety       Past Medical History:   Diagnosis Date     ADHD (attention deficit hyperactivity disorder)      Bipolar 1 disorder, manic, mild      Cauda equina syndrome      Depressive disorder      GERD (gastroesophageal reflux disease)      Marginal corneal ulcer, left 7/17/2015     Nephrolithiasis      Polysubstance abuse     currently in remission         Current Outpatient Medications on File Prior to Visit:  eszopiclone (LUNESTA) 3 MG tablet Take 1 tablet (3 mg) by mouth At Bedtime   LORazepam (ATIVAN) 0.5 MG tablet Take 1 tablet (0.5 mg) by mouth 2 times daily as needed for anxiety No more than 2 tablets/day.  Medications to be dispensed by wife, Sergio.   norelgestromin-ethinyl estradiol (ORTHO EVRA) 150-35 MCG/24HR patch Remove old patch and apply new patch onto the skin once a week for 3 weeks (21 days). Ok to wear consistently   traZODone (DESYREL) 50 MG tablet Take  50 mg by mouth nightly as needed for sleep (May repeat in 30 minutes if ineffective)    trifluoperazine (STELAZINE) 2 MG tablet Take 1 tablet (2 mg) by mouth 3 times daily   ondansetron (ZOFRAN) 4 MG tablet Take 1 tablet (4 mg) by mouth every 6 hours as needed for nausea (Patient not taking: Reported on 4/17/2019)     No current facility-administered medications on file prior to visit.     Social History     Tobacco Use     Smoking status: Former Smoker     Packs/day: 0.50     Years: 5.00     Pack years: 2.50     Types: Other     Start date: 8/1/2011     Smokeless tobacco: Current User     Types: Chew     Tobacco comment: Smokes E-cig   Substance Use Topics     Alcohol use: No     Alcohol/week: 0.0 oz     Drug use: Yes     Types: Marijuana     Comment: past use heroin and cocaine       ROS:  Gen: no fevers  Musculoskel: + as above  Skin: as above  Neuro-negative  Psych-normal mentation    OBJECTIVE:  /87 (BP Location: Left arm, Patient Position: Chair, Cuff Size: Adult Regular)   Pulse 107   Temp 98.5  F (36.9  C) (Oral)   Wt 88.7 kg (195 lb 9.6 oz)   LMP 04/17/2019   SpO2 97%   BMI 30.64 kg/m     General:   awake, alert, and cooperative.  NAD.   Head: Normocephalic, atraumatic.  Eyes: Conjunctiva clear,   MS: Right wrist is nontender to palpation with full range of motion.  She has swelling and redness over the fourth and fifth MCP  areas with palpable tenderness mainly over the fifth MCP joint.  The pinky finger may have some mild angulation versus just swelling.  Strong palpable radial pulse.  Capillary refill is less than 1 second to all fingers.  Sensation to soft touch intact hand and fingers.  Neuro: Alert and oriented - normal speech.    Xray- I see no obvious fracture or dislocation. ? Prior fracture proximal 4th metacarpal.    ASSESSMENT:    ICD-10-CM    1. Hand injury, right, initial encounter S69.91XA XR Hand Right G/E 3 Views     order for DME     ORTHO  REFERRAL   2. Contusion of  right hand, initial encounter S60.221A ORTHO  REFERRAL   3. Sprain of right little finger, unspecified site of finger, initial encounter S63.616A ORTHO  REFERRAL   4. Sprain of right ring finger, unspecified site of finger, initial encounter S63.614A ORTHO  REFERRAL           PLAN: Radiology report pending.Let her Ortho Surgeon know about injury to had, ring pinky finger with upcoming finger sheath cyst surgery. Ice, splint, elevate, Ibu.   Advised about symptoms which might herald more serious problems.      Ruby Glover PA-C           [FreeTextEntry1] : Fluzone today.Haic today

## 2019-10-03 NOTE — PROGRESS NOTES
"NAVIGATE Clinician Contact & Progress Note   For Family Education Program    NAVIGATE Enrollee: Ayana Prasad (1990)     MRN: [id number]  Date:  9/27/19  Diagnosis(es):   Schizoaffective d/o, bipolar type  Clinician: NAVIG[facility] Family Clinician, NATHAN Wong     1. Type of contact: (majority of time spent)  Family Session    2. People present:   Writer  Client: No  Significant Other/Family/Friend:  Other: wife: Sergio    3. Length of Actual Contact: Start Time: 1:30 pm; End Time: 2 pm   Traveled?    No     4. Location of contact:  Psychiatry Clinic, [place]    5. Did the client complete the home practice option(s) from the previous session: Not Applicable    6. Motivational Teaching Strategies:  Promote hope and positive expectations  Explore pros and cons of change    7. Educational Teaching Strategies:  Relate information to client's experience  Ask questions to check comprehension    8. CBT Teaching Strategies:  Reinforcement and shaping (gains in knowledge & skills)    9. Psychoeducational Topic(s) Addressed:  Family Education Orientation  10. Techniques utilized:   [place] announced at beginning of session  Present new material  Problem-solving practice    11. Assessment/Progress Note:     Reviewed NAVIGATE treatment components and associated team members. Provided an introduction to the NAVIGATE Family Program. Family  was not open to meeting weekly for family therapy appts. Rather, they agreed to meet occasionally by phone and/or in-person. Due to client's wife's family obligations with her children, finding the time for a phone call and/or clinic visit would be difficult. We then dialogued about safety. Identified Ayana's SI/HI safety risk as Medium due to chronic passive suicidal ideation and a history of suicide attempts. Discussed safety plan to include utilization of crisis hotlines (eg Crisis Text Line (text \"LIFE\" to 99027 or call 1-293-818-TALK, 1-745.876.3215)), calling 9-1-1, and " visiting the nearest ED should there be concerns for Ayana's safety or the safety of others. Client's wife said she is well aware of crisis contacts and procedures and has helped client go to the hospital several times in the past.     This clinician also also reminded client's wife about the following:  - Keeping expectations minimal, but don't let them all go  - Encourage but do not nag. Choose your battles  - Help your relative keep to as close to a normal routine as possible  - Don't argue with a relative over worrisome thoughts  - Continue to do enjoyable activities together    Offered hope for recovery. Praised family for their involvement and seeking services. Informed them of evidence suggesting recovery rates tend to be higher with family involvement. Emphasized the importance of self care.     Home practice identified as: Try to find a date and time that could work to come in and meet in person.    Overall family seemed somewhat engaged in conversation. They expressed moderate interest in continuing to meet for family therapy and psychoeducation. As of today's appt their insight into Ayana's mental illness appears good. They seem they would benefit from continued clinical intervention aimed at assisting them implement helpful strategies at home and increase their understanding of psychosis.      12. Plan/Referrals:     Will meet with family as schedule allows for evidence based family psychoeducation and therapeutic support aimed at maximizing Ayana's opportunity for recovery from psychosis.       NATHAN Wong   NAVIGATE     Attestation:    I did not see this patient directly. This patient is discussed with me in individual clinical social work supervision, and I agree with the plan as documented.     Linh Howell, LICSW, MSW, LICSW, November 7, 2019

## 2019-10-03 NOTE — PROGRESS NOTES
JILLIAN Clinician Contact & Progress Note  For Individual Resiliency Training (IRT)  A Part of the [facility] First Episode of Psychosis Program    NAVIGATE Enrollee: Ayana Prasad (1990)     MRN: [id number]  Date:  9/06/19  Diagnosis: Schizoaffective, bipolar type (F25.0)  Clinician: JILLIAN Individual Resiliency Trainer, JULISA Ring     1. Type of contact: (majority of time spent)  IRT Session    2. People present:   Writer  Client: Ayana Prasad    3. Length of Actual Contact: Start Time: 11:00; End Time: 12:00   Traveled?    No     4. Location of contact:  [facility]iat[facility], [place]    5. Did the client complete the home practice option(s) from the previous session: Partially Completed    6. Motivational Teaching Strategies:  Connect info and skills with personal goals  Promote hope and positive expectations  Explore pros and cons of change  Re-frame experiences in positive light    7. Educational Teaching Strategies:  Review of written material/education  Relate information to client's experience  Ask questions to check comprehension  Break down information into small chunks  Adopt client's language     8. CBT Teaching Strategies:  Reinforcement and shaping (positive feedback for steps towards goals and gains in knowledge & skills)  Relapse prevention planning (review of stressors)  Coping skills training (review current coping skills, increase currently used skills and plan home practice)    9. IRT Module(s) Addressed:  Module 3 - Education about Psychosis  Module 9 - Coping with Symptoms  Module 10 - Substance Use    10. Techniques utilized:   [place] announced at beginning of session  Review of homework  Review of goal  Review of previous meeting  Present new material  Problem-solving practice  Help client choose a home practice option  Summarize progress made in current session    11. Mental Status Exam:    Alertness: alert  and oriented  Appearance: casually groomed  Behavior/Demeanor:  "pleasant and passive, with good  eye contact   Speech: normal and regular rate and rhythm  Language: intact. Preferred language identified as English.  Psychomotor: fidgety  Mood: depressed  Affect: restricted; was congruent to mood; was congruent to content  Thought Process/Associations: perseverative  Thought Content:  Reports suicidal ideation and preoccupations;  Denies violent ideation  Perception:  Reports auditory hallucinations;  Denies visual hallucinations  Insight: fair  Judgment: fair  Cognition: does  appear grossly intact; formal cognitive testing was not done  Suicidal ideation: reports SI, denies intent,  and denies plan  Homicidal Ideation: denies    12. Assessment/Progress Note:     This writer met with Ayana for a follow-up IRT session after she returned from staying with her parents in [facility]. She stated her wife requested she return home and Ayana felt interpersonal conflicts could be resolved. Ayana mentioned she enjoyed being in [place], as her anxiety and preoccupations were reduced. This writer helped Ayana to brainstorm strategies to be in nature in [place]; she agreed to try these throughout the coming week. She discussed the event that caused interpersonal conflict at home, but noted it has improved and the other person involved is \"trying to be better.\" Ayana reported command suicidal auditory hallucinations, but denied any specific plans, urges, or intent to act. She reported protective factors as not wanting to go to the hospital, no current conversing with the voices, and family. Ayana has awareness into her symptoms/warning signs and agreed to utilize crisis resources if a plan or intent arise. However, this writer utilized motivational interviewing to discuss day treatment, for an increased support structure. Ayana was agreeable to this and stated she wants to \"get better at opening up.\" This writer will coordinate with Ayana's primary provider to facilitate a referral. " "During the session, this writer also used a harm reduction approach to discuss marijuana use. Ayana mentioned her use is intermittent, but is a significant coping strategy for anxiety. She denied interest in quitting at this time. However, she was agreeable to reducing the frequency of use.     13. Plan/Referrals:     This writer will continue to utilize CBT, problem solving, and psycho-education methods to assist Ayana in meeting her goals of improving relationships, obtaining a job, and decreasing anxiety.     Billing for \"Interactive Complexity\"?    No      JULISA RingATE Individual Resiliency Trainer  "

## 2019-10-04 ENCOUNTER — OFFICE VISIT (OUTPATIENT)
Dept: PSYCHIATRY | Facility: CLINIC | Age: 29
End: 2019-10-04
Payer: COMMERCIAL

## 2019-10-04 DIAGNOSIS — F25.0 SCHIZOAFFECTIVE DISORDER, BIPOLAR TYPE (H): Primary | ICD-10-CM

## 2019-10-04 DIAGNOSIS — F25.0 SCHIZOAFFECTIVE DISORDER, BIPOLAR TYPE (H): ICD-10-CM

## 2019-10-04 DIAGNOSIS — F31.11 BIPOLAR 1 DISORDER, MANIC, MILD (H): ICD-10-CM

## 2019-10-04 DIAGNOSIS — Z72.0 TOBACCO ABUSE: ICD-10-CM

## 2019-10-04 RX ORDER — LORAZEPAM 1 MG/1
1 TABLET ORAL 2 TIMES DAILY PRN
Qty: 30 TABLET | Refills: 0 | Status: SHIPPED | OUTPATIENT
Start: 2019-10-04 | End: 2019-10-16

## 2019-10-04 ASSESSMENT — ANXIETY QUESTIONNAIRES
6. BECOMING EASILY ANNOYED OR IRRITABLE: NOT AT ALL
2. NOT BEING ABLE TO STOP OR CONTROL WORRYING: MORE THAN HALF THE DAYS
5. BEING SO RESTLESS THAT IT IS HARD TO SIT STILL: NOT AT ALL
GAD7 TOTAL SCORE: 8
7. FEELING AFRAID AS IF SOMETHING AWFUL MIGHT HAPPEN: NOT AT ALL
4. TROUBLE RELAXING: MORE THAN HALF THE DAYS
1. FEELING NERVOUS, ANXIOUS, OR ON EDGE: MORE THAN HALF THE DAYS
3. WORRYING TOO MUCH ABOUT DIFFERENT THINGS: MORE THAN HALF THE DAYS

## 2019-10-04 ASSESSMENT — PATIENT HEALTH QUESTIONNAIRE - PHQ9: SUM OF ALL RESPONSES TO PHQ QUESTIONS 1-9: 8

## 2019-10-04 NOTE — PROGRESS NOTES
JILLIAN Clinician Contact & Progress Note  For Individual Resiliency Training (IRT)  A Part of the [facility] First Episode of Psychosis Program    NAVIGATE Enrollee: Ayana Prasad (1990)     MRN: [id number]  Date:  10/04/19  Diagnosis: Schizoaffective disorder, bipolar type (F25.0)  Clinician: JILLIAN Individual Resiliency Trainer, JULISA Ring     1. Type of contact: (majority of time spent)  Family Session    2. People present:   Writer  Client: Ayana Prasad  Other: Ayana's Wife, Sergio    3. Length of Actual Contact: Start Time: 11:00; End Time: 11:55   Traveled?    No     4. Location of contact:  Psychiatry Clinic, [place]    5. Did the client complete the home practice option(s) from the previous session: Partially Completed    6. Motivational Teaching Strategies:  Connect info and skills with personal goals  Explore pros and cons of change  Re-frame experiences in positive light    7. Educational Teaching Strategies:  Relate information to client's experience  Ask questions to check comprehension  Adopt client's language     8. CBT Teaching Strategies:  Reinforcement and shaping (positive feedback for steps towards goals and gains in knowledge & skills)  Relapse prevention planning (review of stressors and early warning signs)    9. IRT Module(s) Addressed:  Module 4 - Relapse Prevention Planning    10. Techniques utilized:   [place] announced at beginning of session  Review of goal  Review of previous meeting  Present new material  Problem-solving practice  Summarize progress made in current session    11. Mental Status Exam:    Alertness: alert  and oriented  Appearance: casually groomed  Behavior/Demeanor: passive, with good  eye contact   Speech: normal and regular rate and rhythm  Language: intact. Preferred language identified as English.  Psychomotor: fidgety  Mood: depressed  Affect: appropriate and indifferent; was congruent to mood; was congruent to content  Thought  Process/Associations: perseverative  Thought Content:  Reports preoccupations;  Denies suicidal and violent ideation  Perception:  Reports auditory hallucinations;  Denies visual hallucinations  Insight: fair  Judgment: fair  Cognition: does  appear grossly intact; formal cognitive testing was not done  Suicidal ideation: denies SI, denies intent,  and denies plan  Homicidal Ideation: denies    12. Assessment/Progress Note:     This writer met with Ayana and Sergio, Ayana's wife, for a family session. Ayana began by discussing changes in symptoms. She noted the auditory hallucinations were completely non-existent for 2 days. Ayana stated this experience was concerning and scary, as she has heard voices throughout the majority of every day for a period of time. Ayaan denied command suicidal hallucinations, but reported ongoing preoccupations and anxiety. Sergio noted the anxiety is deep-rooted and the causes may not have been disclosed yet. This writer validated her response and therapy's effectiveness when individuals are open; however, this writer also reinforced the importance of Ayana's comfortability in doing so. This writer assisted Sergio and Ayana in reviewing Ayana's progress so far and the next steps in treatment. Sergio noted she feels Ayana is at a 3 out of 4 (with 4 being the worst- requiring hospitalization); Ayana rated herself at a 2 (feeling as though she's coping with symptoms and seeking support). This writer offered additional supportive measures, such as day treatment, partial hospitalization, support groups in the community. Ayana stated she is not interested, as she does not have enough time off from work to participate. This writer validated her concern and assisted in problem solving, including obtaining accommodations for appointments or FMLA. Ayana expressed continued disinterest in alternate supports at this time. This writer guided Sergio and Ayana in identifying other supportive measures and  "coping strategies. Sergio mentioned medication adherence is important in reducing thoughts of jumping out of cars, decreased paranoia about others killing her, and elimination of risky situations. Ayana agreed with this and agreed to take medications regularly. Sergio suggested Ayana spend more time with the family when home. Ayana discussed her hesitations with this, but agreed to do so at least once daily. Ayana denied time spent walking outside recently, but agreed to discuss this with Sergio prior to doing so (for safety reasons). Sergio was agreeable to continuing with Sterling for family sessions once per month. This writer encouraged her to schedule, in order to further discuss ways of supporting Ayana.     13. Plan/Referrals:     This writer will continue to utilize CBT, problem solving, and psycho-education methods to assist Ayana in meeting her goals of improving relationships, obtaining a job, and decreasing anxiety.     Billing for \"Interactive Complexity\"?    No      JULISA Ring Individual Resiliency Trainer    "

## 2019-10-05 ASSESSMENT — ANXIETY QUESTIONNAIRES: GAD7 TOTAL SCORE: 8

## 2019-10-06 ENCOUNTER — NURSE TRIAGE (OUTPATIENT)
Dept: NURSING | Facility: CLINIC | Age: 29
End: 2019-10-06

## 2019-10-06 NOTE — TELEPHONE ENCOUNTER
"Ayana not wanting triage but willing to answer questions with no elaboration.  Ongoing back pain, not new.  Calling to schedule OV appt, did not trigger ED or immediate OV disposition.  Transferred back to scheduling.      Reason for Disposition    [1] MODERATE back pain (e.g., interferes with normal activities) AND [2] present > 3 days    Additional Information    Negative: [1] SEVERE abdominal pain AND [2] present > 1 hour    Negative: [1] Abdominal pain AND [2] age > 60    Negative: Weakness of a leg or foot (e.g., unable to bear weight, dragging foot)    Negative: Unable to walk    Negative: Patient sounds very sick or weak to the triager    Negative: [1] SEVERE back pain (e.g., excruciating, unable to do any normal activities) AND [2] not improved 2 hours after pain medicine    Negative: [1] Pain radiates into the thigh or further down the leg AND [2] both legs    Negative: [1] Fever > 100.0 F (37.8 C) AND [2] flank pain (i.e., in side, below ribs and above hip)    Negative: [1] Pain or burning with urination AND [2] flank pain (i.e., in side, below ribs and above hip)    Negative: Numbness in a leg or foot (i.e., loss of sensation)    Negative: [1] Upper back pain AND [2] numbness in a arm or hand (i.e., loss of sensation)    Negative: High-risk adult (e.g., history of cancer, HIV, or IV drug abuse)    Negative: [1] Fever AND [2] no symptoms of UTI  (Exception: has generalized muscle pains, not localized back pain)    Negative: Rash in same area as pain (may be described as \"small blisters\")    Negative: Blood in urine (red, pink, or tea-colored)    Protocols used: BACK PAIN-A-AH      "

## 2019-10-07 ENCOUNTER — E-VISIT (OUTPATIENT)
Dept: FAMILY MEDICINE | Facility: CLINIC | Age: 29
End: 2019-10-07
Payer: COMMERCIAL

## 2019-10-07 DIAGNOSIS — M54.50 LOW BACK PAIN, UNSPECIFIED BACK PAIN LATERALITY, UNSPECIFIED CHRONICITY, UNSPECIFIED WHETHER SCIATICA PRESENT: Primary | ICD-10-CM

## 2019-10-07 PROCEDURE — 99207 ZZC NO BILLABLE SERVICE THIS VISIT: CPT | Performed by: NURSE PRACTITIONER

## 2019-10-08 ENCOUNTER — VIRTUAL VISIT (OUTPATIENT)
Dept: PSYCHIATRY | Facility: CLINIC | Age: 29
End: 2019-10-08
Payer: COMMERCIAL

## 2019-10-08 DIAGNOSIS — F25.0 SCHIZOAFFECTIVE DISORDER, BIPOLAR TYPE (H): Primary | ICD-10-CM

## 2019-10-08 NOTE — PROGRESS NOTES
This writer called [facility] transportation service, as Ayana mentioned they were not allowing transportation to her primary care provider.     Golimi stated Ayana has hit the mileage limit set by her insurance company, and therefore, needs to have a Transportation Verification Form filled out by her primary care office. This indicates she is an established patient at that facility and needs to obtain services in [place]. This writer provided a fax number and best contact number for the [place] facility. This writer then coordinated with Becki Avery regarding the form via [employer].

## 2019-10-09 ENCOUNTER — OFFICE VISIT (OUTPATIENT)
Dept: FAMILY MEDICINE | Facility: CLINIC | Age: 29
End: 2019-10-09
Payer: COMMERCIAL

## 2019-10-09 ENCOUNTER — TELEPHONE (OUTPATIENT)
Dept: PALLIATIVE MEDICINE | Facility: CLINIC | Age: 29
End: 2019-10-09

## 2019-10-09 VITALS
BODY MASS INDEX: 32.2 KG/M2 | RESPIRATION RATE: 16 BRPM | WEIGHT: 205.6 LBS | TEMPERATURE: 98.6 F | HEART RATE: 68 BPM | DIASTOLIC BLOOD PRESSURE: 76 MMHG | SYSTOLIC BLOOD PRESSURE: 112 MMHG

## 2019-10-09 DIAGNOSIS — F25.0 SCHIZOAFFECTIVE DISORDER, BIPOLAR TYPE (H): ICD-10-CM

## 2019-10-09 DIAGNOSIS — M51.369 BULGING LUMBAR DISC: Primary | ICD-10-CM

## 2019-10-09 DIAGNOSIS — Z72.0 TOBACCO ABUSE: ICD-10-CM

## 2019-10-09 DIAGNOSIS — Z23 NEED FOR INFLUENZA VACCINATION: ICD-10-CM

## 2019-10-09 PROBLEM — F15.11 HISTORY OF METHAMPHETAMINE ABUSE (H): Status: RESOLVED | Noted: 2019-02-25 | Resolved: 2019-10-09

## 2019-10-09 PROCEDURE — 90471 IMMUNIZATION ADMIN: CPT | Performed by: NURSE PRACTITIONER

## 2019-10-09 PROCEDURE — 90686 IIV4 VACC NO PRSV 0.5 ML IM: CPT | Performed by: NURSE PRACTITIONER

## 2019-10-09 PROCEDURE — 99214 OFFICE O/P EST MOD 30 MIN: CPT | Mod: 25 | Performed by: NURSE PRACTITIONER

## 2019-10-09 RX ORDER — NAPROXEN 500 MG/1
TABLET ORAL
Qty: 60 TABLET | Refills: 1 | Status: ON HOLD | OUTPATIENT
Start: 2019-10-09 | End: 2019-10-23

## 2019-10-09 ASSESSMENT — ENCOUNTER SYMPTOMS
COUGH: 0
DIZZINESS: 0
FATIGUE: 0
SHORTNESS OF BREATH: 0
HEADACHES: 0
SORE THROAT: 0
ABDOMINAL PAIN: 0
ABDOMINAL DISTENTION: 0
APPETITE CHANGE: 1
DYSPHORIC MOOD: 0
BACK PAIN: 1
NAUSEA: 0
PALPITATIONS: 0
CONSTIPATION: 0
DIARRHEA: 0
LIGHT-HEADEDNESS: 0
VOMITING: 0
SLEEP DISTURBANCE: 1
RHINORRHEA: 0
ARTHRALGIAS: 0
NERVOUS/ANXIOUS: 0
MYALGIAS: 0
CHEST TIGHTNESS: 0
NUMBNESS: 1
WHEEZING: 0

## 2019-10-09 ASSESSMENT — PAIN SCALES - GENERAL: PAINLEVEL: EXTREME PAIN (9)

## 2019-10-09 NOTE — PATIENT INSTRUCTIONS
No additional over the counter ibuprofen, Advil, naproxen, aleve. Ok to take Tylenol or acetaminophen.     Take naproxen with food.

## 2019-10-11 ENCOUNTER — OFFICE VISIT (OUTPATIENT)
Dept: PSYCHIATRY | Facility: CLINIC | Age: 29
End: 2019-10-11
Payer: COMMERCIAL

## 2019-10-11 DIAGNOSIS — F25.0 SCHIZOAFFECTIVE DISORDER, BIPOLAR TYPE (H): Primary | ICD-10-CM

## 2019-10-11 ASSESSMENT — PATIENT HEALTH QUESTIONNAIRE - PHQ9: SUM OF ALL RESPONSES TO PHQ QUESTIONS 1-9: 5

## 2019-10-11 NOTE — PROGRESS NOTES
NAVIGATE Clinician Contact & Progress Note  For Individual Resiliency Training (IRT)  A Part of the [facility] First Episode of Psychosis Program    NAVIGATE Enrollee: Ayana Prasad (1990)     MRN: [id number]  Date:  10/11/19  Diagnosis: Schizoaffective disorder, bipolar type (F25.0)  Clinician: JILLIAN Individual Resiliency Trainer, JULISA Ring     1. Type of contact: (majority of time spent)  IRT Session    2. People present:   Writer  Client: Ayana Prasad  JILLIAN Family Clinician, Sterling Jimenez MA, LMFT    3. Length of Actual Contact: Start Time: 1:00; End Time: 2:00   Traveled?    No     4. Location of contact:  Psychiatry Clinic, [place]    5. Did the client complete the home practice option(s) from the previous session: Partially Completed    6. Motivational Teaching Strategies:  Connect info and skills with personal goals  Promote hope and positive expectations  Explore pros and cons of change  Re-frame experiences in positive light    7. Educational Teaching Strategies:  Review of written material/education  Relate information to client's experience  Ask questions to check comprehension  Break down information into small chunks  Adopt client's language     8. CBT Teaching Strategies:  Reinforcement and shaping (positive feedback for steps towards goals and gains in knowledge & skills)  Relapse prevention planning (review of stressors and early warning signs)  Coping skills training (review current coping skills, increase currently used skills and plan home practice)    9. IRT Module(s) Addressed:  Module 4 - Relapse Prevention Planning  Module 8 - Dealing with Negative Feelings  Module 9 - Coping with Symptoms  Module 10 - Substance Use    10. Techniques utilized:   [place] announced at beginning of session  Review of homework  Review of previous meeting  Present new material  Problem-solving practice  Help client choose a home practice option  Summarize progress made in current  "session    11. Mental Status Exam:    Alertness: alert  and oriented  Appearance: casually groomed  Behavior/Demeanor: guarded, with good  eye contact   Speech: normal and regular rate and rhythm  Language: intact. Preferred language identified as English.  Psychomotor: agitated  Mood: anxious, worried and agitated  Affect: appropriate; was congruent to mood; was congruent to content  Thought Process/Associations: perseverative  Thought Content:  Reports preoccupations;  Denies suicidal and violent ideation  Perception:  Reports auditory hallucinations;  Denies visual hallucinations  Insight: fair  Judgment: poor  Cognition: does  appear grossly intact; formal cognitive testing was not done  Suicidal ideation: denies SI, denies intent,  and denies plan  Homicidal Ideation: denies    12. Assessment/Progress Note:     This writer met with Ayana for a follow-up IRT session. Ayana reported she has been taking medications, but is having stomach pain when not taking them with food. This writer encouraged her to take them with food if this has been helpful, but to also call the clinic if it persists; she agreed. Ayana denied any command hallucinations, suicidal thoughts, or delusional content this week. She did report ongoing auditory hallucinations. Ayana was visibly agitated and stated she is upset with the recommendation from a team member for decreased marijuana use. This writer used validation and supportive listening to de-escalate. Ayana then shared she has been using \"other substances\" this week, but denied a more detailed description on which substances. She noted she has been experiencing stress and is unsure about the status of her relationship. This writer utilized motivational interviewing to understand the context of use, as well as provide alternate coping strategies/stress management techniques. This writer provided psycho-education into how substances can impact symptoms, reinforcing our team's harm " "reduction stance. Ayana expressed awareness into the long-term consequences of use, such as worsening symptoms, relationship stress, financial strain, potential legal problems, safety concerns, etc. She ultimately agreed to stop using substances outside of marijuana this week. Ayana then requested to meet Sterling, Family Clinician, as he facilitates family sessions with Ayana's wife. This writer verified he is able to introduce himself during the session. He provided supportive listening, perspective into family's concerns, and education on typical topics discussed in family sessions. Ayana was receptive to this and thanked him for joining.     13. Plan/Referrals:     This writer will continue to utilize CBT, problem solving, and psycho-education methods to assist Ayana in meeting her goals of improving relationships, obtaining a job, and decreasing anxiety.     Billing for \"Interactive Complexity\"?    No      JULISA Ring Individual Resiliency Trainer  "

## 2019-10-11 NOTE — PROGRESS NOTES
NAVIGSERGIO Clinician Contact & Progress Note  For Individual Resiliency Training (IRT)  A Part of the [facility] First Episode of Psychosis Program    NAVIGATE Enrollee: Ayana Prasad (1990)     MRN: [id number]  Date:  9/13/19  Diagnosis: Schizoaffective disorder, bipolar type (F25.0)  Clinician: JILLIAN Individual Resiliency Trainer, JULISA Ring     1. Type of contact: (majority of time spent)  IRT Session    2. People present:   Writer  Client: Ayana Prasad    3. Length of Actual Contact: Start Time: 11:00; End Time: 12:00   Traveled?    No     4. Location of contact:  Psychiatry Clinic, [place]    5. Did the client complete the home practice option(s) from the previous session: Partially Completed    6. Motivational Teaching Strategies:  Connect info and skills with personal goals  Promote hope and positive expectations  Explore pros and cons of change  Re-frame experiences in positive light    7. Educational Teaching Strategies:  Review of written material/education  Relate information to client's experience  Ask questions to check comprehension  Break down information into small chunks  Adopt client's language     8. CBT Teaching Strategies:  Reinforcement and shaping (positive feedback for steps towards goals, gains in knowledge & skills and follow-through on home assignments)  Coping skills training (review current coping skills, increase currently used skills and plan home practice)    9. IRT Module(s) Addressed:  Module 2 - Assessment/Initial Goal Setting  Module 9 - Coping with Symptoms    10. Techniques utilized:   [place] announced at beginning of session  Review of homework  Review of goal  Review of previous meeting  Present new material  Problem-solving practice  Help client choose a home practice option  Summarize progress made in current session    11. Mental Status Exam:    Alertness: alert  and oriented  Appearance: casually groomed  Behavior/Demeanor: pleasant and passive, with fair   eye contact   Speech: normal and regular rate and rhythm  Language: intact. Preferred language identified as English.  Psychomotor: normal or unremarkable  Mood: depressed  Affect: restricted; was congruent to mood; was congruent to content  Thought Process/Associations: unremarkable  Thought Content:  Reports suicidal ideation and preoccupations;  Denies violent ideation  Perception:  Reports auditory hallucinations;  Denies visual hallucinations  Insight: fair  Judgment: fair  Cognition: does  appear grossly intact; formal cognitive testing was not done  Suicidal ideation: reports SI, denies intent,  and denies plan  Homicidal Ideation: denies    12. Assessment/Progress Note:     This writer met with Ayana for a follow-up IRT session. Ayana stated she has an evaluation at day treatment on Tuesday. This writer praised her for willingness to seek additional support. She mentioned she needs refills on several medications; this writer will coordinate with Becki for this. This writer began a conversation about family support and the benefits of family involvement in [employer]. Ayana agreed to have Sterling, Family Clinician, call Sergio to initiate sessions. Ayana stated she has been spending more time isolating and Sergio is concerned. She noted she isolates frequently due to anxiety at the house. She denied any delusional thoughts, but reported command suicidal ideation. Ayana denied any specific plans or intent to act on the commands at this time. However, this writer reviewed crisis resources and encouraged her to utilize them. Ayana reported decreased appetite the last 8 days due to stomach discomfort. She denied any substance use throughout the week and stated she has been going to sleep at 3 AM. However, Ayana denied any increased focused activities, pressured speech, energy, or impulsivity. This writer and Ayana began the Satisfaction With Areas Of My Life Questionnaire. She mentioned she used to enjoy fishing,  "drawing, painting, and playing tennis. She discussed her upbringing as [employer], but noted she is currently exploring her spiritual views. This writer encouraged Ayana to utilize creativity and drawing when experiencing moments of anxiety. She agreed to try this throughout the week as a home practice opportunity.     13. Plan/Referrals:     This writer will continue to utilize CBT, problem solving, and psycho-education methods to assist Ayana in meeting her goals of improving relationships, obtaining a job, and decreasing anxiety.     Billing for \"Interactive Complexity\"?    No      JULISA Ring Individual Resiliency Trainer  "

## 2019-10-16 DIAGNOSIS — Z72.0 TOBACCO ABUSE: ICD-10-CM

## 2019-10-16 DIAGNOSIS — F31.11 BIPOLAR 1 DISORDER, MANIC, MILD (H): ICD-10-CM

## 2019-10-16 DIAGNOSIS — F25.0 SCHIZOAFFECTIVE DISORDER, BIPOLAR TYPE (H): ICD-10-CM

## 2019-10-16 RX ORDER — LORAZEPAM 1 MG/1
1 TABLET ORAL 2 TIMES DAILY PRN
Qty: 30 TABLET | Refills: 0 | Status: ON HOLD | OUTPATIENT
Start: 2019-10-16 | End: 2019-12-13

## 2019-10-16 NOTE — PROGRESS NOTES
NAVIGSERGIO Clinician Contact & Progress Note  For Individual Resiliency Training (IRT)  A Part of the [facility] First Episode of Psychosis Program    NAVIGATE Enrollee: Ayana Prasad (1990)     MRN: [id number]  Date:  9/20/19  Diagnosis: Schizoaffective disorder, bipolar type (F25.0)  Clinician: JILLIAN Individual Resiliency Trainer, JULISA Ring     1. Type of contact: (majority of time spent)  IRT Session    2. People present:   Writer  Client: Ayana Prasad    3. Length of Actual Contact: Start Time: 11:00; End Time: 12:00   Traveled?    No     4. Location of contact:  [facility], [address]    5. Did the client complete the home practice option(s) from the previous session: Partially Completed    6. Motivational Teaching Strategies:  Connect info and skills with personal goals  Promote hope and positive expectations  Explore pros and cons of change  Re-frame experiences in positive light    7. Educational Teaching Strategies:  Review of written material/education  Relate information to client's experience  Ask questions to check comprehension  Break down information into small chunks  Adopt client's language     8. CBT Teaching Strategies:  Reinforcement and shaping (positive feedback for steps towards goals and gains in knowledge & skills)  Relapse prevention planning (review of stressors, early warning signs and written plan to respond to signs)    9. IRT Module(s) Addressed:  Module 4 - Relapse Prevention Planning  Module 9 - Coping with Symptoms    10. Techniques utilized:   [place] announced at beginning of session  Review of homework  Review of previous meeting  Present new material  Problem-solving practice  Help client choose a home practice option  Summarize progress made in current session    11. Mental Status Exam:    Alertness: alert  and oriented  Appearance: casually groomed  Behavior/Demeanor: passive, with fair  eye contact   Speech: normal and regular rate and rhythm  Language:  "intact. Preferred language identified as English.  Psychomotor: restless and fidgety  Mood: depressed  Affect: restricted; was congruent to mood; was congruent to content  Thought Process/Associations: perseverative  Thought Content:  Reports suicidal ideation and preoccupations;  Denies violent ideation  Perception:  Reports auditory hallucinations;  Denies visual hallucinations  Insight: fair  Judgment: poor  Cognition: does  appear grossly intact; formal cognitive testing was not done  Suicidal ideation: reports SI, denies intent,  and denies plan  Homicidal Ideation: denies    12. Assessment/Progress Note:     This writer met with Ayana for a follow-up IRT session.  Ayana stated she has been sleeping about 2-3 hours and eating 1 meal per day. She reported using marijuana through vaping about once per week. Ayana reported command suicidal ideation, but denied urges or intent. This writer and Ayana reviewed a safety plan and Ayana stated she has discussed these hallucinations with her wife. Ayana portrayed awareness into a significant warning sign, when she is communicating back to the voices and can no longer use distraction methods. Ayana agreed to utilize crisis resources if she feels she reaches this level or is unable to challenge the hallucinations. Ayana and this writer discussed her current support structure and potential levels of care. She noted she is interested in pursuing day treatment services, but would like to meet twice weekly for IRT until day treatment begins. This writer then guided Ayana through identifying warning signs, triggers, and coping strategies, using a relapse prevention lens. She agreed to the following plan for this week: eat 1 full meal per day, utilize sleep meditation to improve sleep, tell wife where she's walking outside (for safety purposes), call mom when auditory hallucinations are loud (for distraction), use positive self talk statements (\"It'll get better and I don't need " "to listen to the voices\").     13. Plan/Referrals:     This writer will continue to utilize CBT, problem solving, and psycho-education methods to assist Ayana in meeting her goals of improving relationships, obtaining a job, and decreasing anxiety.     Billing for \"Interactive Complexity\"?    No      JULISA RingATE Individual Resiliency Trainer  "

## 2019-10-22 ENCOUNTER — OFFICE VISIT (OUTPATIENT)
Dept: PSYCHIATRY | Facility: CLINIC | Age: 29
End: 2019-10-22
Payer: COMMERCIAL

## 2019-10-22 ENCOUNTER — TRANSFERRED RECORDS (OUTPATIENT)
Dept: HEALTH INFORMATION MANAGEMENT | Facility: CLINIC | Age: 29
End: 2019-10-22

## 2019-10-22 ENCOUNTER — HOSPITAL ENCOUNTER (INPATIENT)
Facility: CLINIC | Age: 29
LOS: 56 days | Discharge: PSYCHIATRIC OR SUBSTANCE USE FACILITY | End: 2019-12-17
Attending: PSYCHIATRY & NEUROLOGY | Admitting: PSYCHIATRY & NEUROLOGY
Payer: COMMERCIAL

## 2019-10-22 ENCOUNTER — CARE COORDINATION (OUTPATIENT)
Dept: PSYCHIATRY | Facility: CLINIC | Age: 29
End: 2019-10-22

## 2019-10-22 DIAGNOSIS — F25.0 SCHIZOAFFECTIVE DISORDER, BIPOLAR TYPE (H): Primary | ICD-10-CM

## 2019-10-22 DIAGNOSIS — F60.3 BORDERLINE PERSONALITY DISORDER (H): ICD-10-CM

## 2019-10-22 DIAGNOSIS — R45.851 SUICIDAL IDEATION: Primary | ICD-10-CM

## 2019-10-22 DIAGNOSIS — F31.11 BIPOLAR 1 DISORDER, MANIC, MILD (H): ICD-10-CM

## 2019-10-22 DIAGNOSIS — F25.0 SCHIZOAFFECTIVE DISORDER, BIPOLAR TYPE (H): ICD-10-CM

## 2019-10-22 LAB
AMPHETAMINES UR QL SCN: NEGATIVE
BARBITURATES UR QL: NEGATIVE
BENZODIAZ UR QL: NEGATIVE
CANNABINOIDS UR QL SCN: POSITIVE
COCAINE UR QL: NEGATIVE
ETHANOL UR QL SCN: NEGATIVE
HCG UR QL: NEGATIVE
LITHIUM SERPL-SCNC: <0.2 MMOL/L (ref 0.6–1.2)
OPIATES UR QL SCN: NEGATIVE

## 2019-10-22 PROCEDURE — 80178 ASSAY OF LITHIUM: CPT | Performed by: STUDENT IN AN ORGANIZED HEALTH CARE EDUCATION/TRAINING PROGRAM

## 2019-10-22 PROCEDURE — 81025 URINE PREGNANCY TEST: CPT | Performed by: PSYCHIATRY & NEUROLOGY

## 2019-10-22 PROCEDURE — 99284 EMERGENCY DEPT VISIT MOD MDM: CPT | Mod: Z6 | Performed by: PSYCHIATRY & NEUROLOGY

## 2019-10-22 PROCEDURE — 80307 DRUG TEST PRSMV CHEM ANLYZR: CPT | Performed by: PSYCHIATRY & NEUROLOGY

## 2019-10-22 PROCEDURE — 25000128 H RX IP 250 OP 636: Performed by: PSYCHIATRY & NEUROLOGY

## 2019-10-22 PROCEDURE — 36415 COLL VENOUS BLD VENIPUNCTURE: CPT | Performed by: STUDENT IN AN ORGANIZED HEALTH CARE EDUCATION/TRAINING PROGRAM

## 2019-10-22 PROCEDURE — 25000132 ZZH RX MED GY IP 250 OP 250 PS 637: Performed by: PSYCHIATRY & NEUROLOGY

## 2019-10-22 PROCEDURE — 99285 EMERGENCY DEPT VISIT HI MDM: CPT | Mod: 25 | Performed by: PSYCHIATRY & NEUROLOGY

## 2019-10-22 PROCEDURE — 25000132 ZZH RX MED GY IP 250 OP 250 PS 637: Performed by: STUDENT IN AN ORGANIZED HEALTH CARE EDUCATION/TRAINING PROGRAM

## 2019-10-22 PROCEDURE — 12400001 ZZH R&B MH UMMC

## 2019-10-22 PROCEDURE — 80320 DRUG SCREEN QUANTALCOHOLS: CPT | Performed by: PSYCHIATRY & NEUROLOGY

## 2019-10-22 RX ORDER — GABAPENTIN 300 MG/1
600 CAPSULE ORAL 3 TIMES DAILY
Status: DISCONTINUED | OUTPATIENT
Start: 2019-10-22 | End: 2019-11-15

## 2019-10-22 RX ORDER — NICOTINE 21 MG/24HR
1 PATCH, TRANSDERMAL 24 HOURS TRANSDERMAL DAILY
Status: DISCONTINUED | OUTPATIENT
Start: 2019-10-22 | End: 2019-12-17 | Stop reason: HOSPADM

## 2019-10-22 RX ORDER — FLUPHENAZINE HYDROCHLORIDE 1 MG/1
1 TABLET ORAL 2 TIMES DAILY
Status: DISCONTINUED | OUTPATIENT
Start: 2019-10-22 | End: 2019-10-23

## 2019-10-22 RX ORDER — OLANZAPINE 10 MG/1
10 TABLET ORAL
Status: DISCONTINUED | OUTPATIENT
Start: 2019-10-22 | End: 2019-10-25

## 2019-10-22 RX ORDER — LORAZEPAM 1 MG/1
1 TABLET ORAL ONCE
Status: COMPLETED | OUTPATIENT
Start: 2019-10-22 | End: 2019-10-22

## 2019-10-22 RX ORDER — OLANZAPINE 10 MG/2ML
10 INJECTION, POWDER, FOR SOLUTION INTRAMUSCULAR
Status: DISCONTINUED | OUTPATIENT
Start: 2019-10-22 | End: 2019-11-06

## 2019-10-22 RX ORDER — FLUPHENAZINE HYDROCHLORIDE 1 MG/1
1 TABLET ORAL EVERY 6 HOURS PRN
Status: DISCONTINUED | OUTPATIENT
Start: 2019-10-22 | End: 2019-10-25

## 2019-10-22 RX ORDER — ALUMINA, MAGNESIA, AND SIMETHICONE 2400; 2400; 240 MG/30ML; MG/30ML; MG/30ML
30 SUSPENSION ORAL EVERY 4 HOURS PRN
Status: DISCONTINUED | OUTPATIENT
Start: 2019-10-22 | End: 2019-12-17 | Stop reason: HOSPADM

## 2019-10-22 RX ORDER — LORAZEPAM 1 MG/1
1 TABLET ORAL 2 TIMES DAILY PRN
Status: DISCONTINUED | OUTPATIENT
Start: 2019-10-22 | End: 2019-11-06

## 2019-10-22 RX ORDER — NAPROXEN 500 MG/1
500 TABLET ORAL 2 TIMES DAILY WITH MEALS
Status: DISCONTINUED | OUTPATIENT
Start: 2019-10-22 | End: 2019-10-22

## 2019-10-22 RX ORDER — TRAZODONE HYDROCHLORIDE 50 MG/1
50 TABLET, FILM COATED ORAL
Status: DISCONTINUED | OUTPATIENT
Start: 2019-10-22 | End: 2019-10-29

## 2019-10-22 RX ORDER — LITHIUM CARBONATE 300 MG/1
600 TABLET, FILM COATED, EXTENDED RELEASE ORAL EVERY EVENING
Status: DISCONTINUED | OUTPATIENT
Start: 2019-10-22 | End: 2019-10-28

## 2019-10-22 RX ORDER — LITHIUM CARBONATE 300 MG/1
300 TABLET, FILM COATED, EXTENDED RELEASE ORAL EVERY MORNING
Status: DISCONTINUED | OUTPATIENT
Start: 2019-10-23 | End: 2019-12-17 | Stop reason: HOSPADM

## 2019-10-22 RX ORDER — PROCHLORPERAZINE MALEATE 5 MG
5 TABLET ORAL EVERY 6 HOURS PRN
Status: DISCONTINUED | OUTPATIENT
Start: 2019-10-22 | End: 2019-10-31

## 2019-10-22 RX ORDER — ONDANSETRON 4 MG/1
4 TABLET, ORALLY DISINTEGRATING ORAL ONCE
Status: COMPLETED | OUTPATIENT
Start: 2019-10-22 | End: 2019-10-22

## 2019-10-22 RX ADMIN — ONDANSETRON 4 MG: 4 TABLET, ORALLY DISINTEGRATING ORAL at 14:28

## 2019-10-22 RX ADMIN — LORAZEPAM 1 MG: 1 TABLET ORAL at 13:19

## 2019-10-22 RX ADMIN — NICOTINE POLACRILEX 4 MG: 4 GUM, CHEWING ORAL at 20:52

## 2019-10-22 RX ADMIN — NICOTINE 1 PATCH: 14 PATCH, EXTENDED RELEASE TRANSDERMAL at 18:23

## 2019-10-22 RX ADMIN — LORAZEPAM 1 MG: 1 TABLET ORAL at 18:21

## 2019-10-22 RX ADMIN — TRAZODONE HYDROCHLORIDE 50 MG: 50 TABLET ORAL at 22:42

## 2019-10-22 RX ADMIN — TRAZODONE HYDROCHLORIDE 50 MG: 50 TABLET ORAL at 21:45

## 2019-10-22 RX ADMIN — TRAZODONE HYDROCHLORIDE 50 MG: 50 TABLET ORAL at 20:44

## 2019-10-22 RX ADMIN — FLUPHENAZINE HYDROCHLORIDE 1 MG: 1 TABLET, FILM COATED ORAL at 19:05

## 2019-10-22 RX ADMIN — LORAZEPAM 1 MG: 1 TABLET ORAL at 16:14

## 2019-10-22 RX ADMIN — LITHIUM CARBONATE 600 MG: 300 TABLET, EXTENDED RELEASE ORAL at 19:05

## 2019-10-22 RX ADMIN — GABAPENTIN 600 MG: 300 CAPSULE ORAL at 19:05

## 2019-10-22 ASSESSMENT — ACTIVITIES OF DAILY LIVING (ADL)
PRIOR_FUNCTIONAL_LEVEL_COMMENT: SAME
RETIRED_COMMUNICATION: 0-->UNDERSTANDS/COMMUNICATES WITHOUT DIFFICULTY
COGNITION: 0 - NO COGNITION ISSUES REPORTED
DRESS: 0-->INDEPENDENT
BATHING: 0-->INDEPENDENT
RETIRED_EATING: 0-->INDEPENDENT
TOILETING: 0-->INDEPENDENT
SWALLOWING: 0-->SWALLOWS FOODS/LIQUIDS WITHOUT DIFFICULTY

## 2019-10-22 ASSESSMENT — ENCOUNTER SYMPTOMS
CHEST TIGHTNESS: 0
NERVOUS/ANXIOUS: 1
DIZZINESS: 0
BACK PAIN: 0
DYSPHORIC MOOD: 1
SHORTNESS OF BREATH: 0
HALLUCINATIONS: 1
ABDOMINAL PAIN: 0
FEVER: 0

## 2019-10-22 ASSESSMENT — ANXIETY QUESTIONNAIRES
5. BEING SO RESTLESS THAT IT IS HARD TO SIT STILL: NEARLY EVERY DAY
3. WORRYING TOO MUCH ABOUT DIFFERENT THINGS: NEARLY EVERY DAY
1. FEELING NERVOUS, ANXIOUS, OR ON EDGE: NEARLY EVERY DAY
GAD7 TOTAL SCORE: 17
2. NOT BEING ABLE TO STOP OR CONTROL WORRYING: NEARLY EVERY DAY
4. TROUBLE RELAXING: NEARLY EVERY DAY
7. FEELING AFRAID AS IF SOMETHING AWFUL MIGHT HAPPEN: MORE THAN HALF THE DAYS
6. BECOMING EASILY ANNOYED OR IRRITABLE: NOT AT ALL

## 2019-10-22 ASSESSMENT — MIFFLIN-ST. JEOR: SCORE: 1610.85

## 2019-10-22 ASSESSMENT — PATIENT HEALTH QUESTIONNAIRE - PHQ9: SUM OF ALL RESPONSES TO PHQ QUESTIONS 1-9: 18
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
## 2019-10-22 NOTE — PROGRESS NOTES
JILLIAN Care Coordination  A Part of the [facility] First Episode of Psychosis Program     Patient Name: Ayana Prasad  /Age:  1990 (29 year old)    Patient present in clinic today, 10/22/19 for an appt with GOLDY Hernandez, NAVIGATE SEE. During appt patient reported SI with plan (no details) and intent. Patient reported unable to keep herself safe if/when she leaves clinic. Patient reported willingness to go to the hospital if accompanied by Linh Hamilton.     Coordinated with JILLIAN Gallagher RNCC. Recommendation for transport via ambulance to [place] ED given patient's hx of impulsivity, AH commanding suicide, and AH commanding her to jump out of a vehicle. Completed 72 hour hold paperwork supporting IP psychiatric hospitalization should patient no longer be open to voluntary hospitalization.     JILLIAN Diagnostic Assessment completed 19 by writer; please see for details.   JILLIAN Medication Management appt was last completed on 19 by Miladis Hamilton MD; please see for details.     Perpetuating factors for symptoms presentation seem to include persistent symptom instability (waxing and waning between depressed mood, grandiosity, risky behavior including substance use, driving without a license, walking for 10 miles without safety precautions or meeting basic needs like wearing shoes and sunscreen, sneaking out of the house), difficulty attending OP appts, possible medication non adherence, changes in sleep, changes in diet, relationship and family issues, and housing stressors.    Recommendations have been for day treatment with consideration for ACT.     Current JILLIAN Services include:  -Medication Management (Psychiatry), Miladis Hamilton MD  -Individual Resiliency Training/IRT (Counseling), JULISA Ring  -Supported Employment and Education/SEE, GOLDY Hernandez  -Family Psychoeducation and Support, NATHAN Wong  -Care Coordination, Estefania Armijo,  RNCC    Contacted [facility] (Phone: [phone]) to give collateral in anticipation of patient's arrival via ambulance with GOLDY Hernandez, JILLIAN SEE.     Will route to team members as an FYI.     JULISA RenteriaATE Director
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
## 2019-10-22 NOTE — PROGRESS NOTES
"   10/22/19 1615   Patient Belongings   Did you bring any home meds/supplements to the hospital?  Yes   Disposition of meds  Sent to security/pharmacy per site process   Patient Belongings locker   Patient Belongings Put in Hospital Secure Location (Security or Locker, etc.) cash/credit card;cell phone/electronics;clothing;medication(s);shoes;wallet   Belongings Search Yes   Clothing Search Yes   Second Staff Lissa A     Pt has with them:  Black sweat pants  Socks    Pt has in their locker:  Pair of gray athletic shoes Samsung Smartphone  Maroon & Gray sweatshirt Blue & Gray jacket  Gray Hat   Black duct tape wallet    [place] ID  Grizzly Pouches  White headphones  $25    Sent to security in envelope [id number]:  [employer] Visa ending 9045   CSL Plasma Mastercard ending 0412  CSL plasma Mastercard ending 2277 [employer] Plasma ending 1814  [place] EBT card ending 1975   Social Security Card  [place] Visa ending [id number]    Sent to security in envelope [id number]:  Gabapentin    Belongings brought in 12/12/2019  \"[place]\"  \"[place]\"  \"[place]\"  \"[employer]\"      A               Admission:  I am responsible for any personal items that are not sent to the safe or pharmacy.  [facility] is not responsible for loss, theft or damage of any property in my possession.    Signature:  _________________________________ Date: _______  Time: _____                                              Staff Signature:  ____________________________ Date: ________  Time: _____      2nd Staff person, if patient is unable/unwilling to sign:    Signature: ________________________________ Date: ________  Time: _____     Discharge:  [facility] has returned all of my personal belongings:    Signature: _________________________________ Date: ________  Time: _____                                          Staff Signature:  ____________________________ Date: ________  Time: _____           "

## 2019-10-22 NOTE — ED NOTES
Bed: HW01  Expected date: 10/22/19  Expected time: 11:32 AM  Means of arrival:   Comments:  Rosario 89, 30 y/o, Female, SI

## 2019-10-22 NOTE — ED PROVIDER NOTES
History     Chief Complaint   Patient presents with     Suicidal     Bad suicidal thoughts with plan to OD on opiates that she has available     The history is provided by the patient and medical records ().     Ayana Prasad is a 29 year old female who comes in due to her worsening symptoms causing her to be suicidal with a plan.  She has a history of borderline personality disorder, PTSD and schizoaffective disorder.  She is accompanied by her  from the [facility] program.  She is very stressed with several stressors including relationship issues with her spouse, family conflict, going to grad school and in the process of moving to a new house.  She has been forgetting to take her medications and takes them 1-2 times a week.  She relapsed after 4 years sobriety on meth, THC, adderall, oxycontin and heroin.  She has been using sporadically the last 3 weeks.  Her last use was yesterday.  She denies any withdrawal symptoms.  She does not feel safe to go home.  She is having auditory hallucinations telling her to kill herself.        I have reviewed the Medications, Allergies, Past Medical and Surgical History, and Social History in the Epic system.    Review of Systems   Constitutional: Negative for fever.   Eyes: Negative for visual disturbance.   Respiratory: Negative for chest tightness and shortness of breath.    Cardiovascular: Negative for chest pain.   Gastrointestinal: Negative for abdominal pain.   Musculoskeletal: Negative for back pain.   Neurological: Negative for dizziness.   Psychiatric/Behavioral: Positive for dysphoric mood, hallucinations and suicidal ideas. Negative for self-injury. The patient is nervous/anxious.    All other systems reviewed and are negative.      Physical Exam   BP: (!) 131/92  Pulse: 95  Temp: 99.5  F (37.5  C)  SpO2: 96 %      Physical Exam  Vitals signs and nursing note reviewed.   Constitutional:       Appearance: Normal appearance. She is  well-developed.   Cardiovascular:      Rate and Rhythm: Normal rate and regular rhythm.      Heart sounds: Normal heart sounds.   Pulmonary:      Effort: Pulmonary effort is normal. No respiratory distress.      Breath sounds: Normal breath sounds.   Neurological:      Mental Status: She is alert and oriented to person, place, and time.   Psychiatric:         Attention and Perception: Attention normal. She perceives auditory hallucinations.         Mood and Affect: Affect normal. Mood is anxious and depressed.         Speech: Speech normal.         Behavior: Behavior normal. Behavior is cooperative.         Thought Content: Thought content is not paranoid or delusional. Thought content includes suicidal ideation. Thought content does not include homicidal ideation. Thought content includes suicidal plan. Thought content does not include homicidal plan.         Cognition and Memory: Cognition and memory normal.         Judgement: Judgement is inappropriate.      Comments: Ayana is a 28 y/o female who looks her age.  She is well groomed with good eye contact.           ED Course        Procedures               Labs Ordered and Resulted from Time of ED Arrival Up to the Time of Departure from the ED - No data to display         Assessments & Plan (with Medical Decision Making)   Ayana will be admitted to the hospital due to her worsening symptoms including hallucinations telling her to kill herself, having suicidal thoughts to overdose on opioids, not taking medications and relapsing on drugs.  At this point she is voluntary and willing to come into the hospital.  She will go to station 22 under Dr. Morin.      I have reviewed the nursing notes.    I have reviewed the findings, diagnosis, plan and need for follow up with the patient.    New Prescriptions    No medications on file       Final diagnoses:   Schizoaffective disorder, bipolar type (H)   Borderline personality disorder (H)       10/22/2019   [facility] [place],  EMERGENCY DEPARTMENT     Chandana Romero MD  10/22/19 [id number]

## 2019-10-22 NOTE — PROGRESS NOTES
Pt admitted voluntary to station 22 at 1515.  Pt BIB OP provider due to SI with plan to right eye.  Pt confirms AH for which she reports Ativan helpful.  Pt calmly reported to writer that she would harm self if given the chance.  [Pt on SIO].       Pt also reports having marital problems with partner, culminating in verbalization of divorce today.   Pt has history of admissions at [facility], last being 'June'.   Pt reports having not slept 'in several days' and not eating 'in a week'.   Pt asked doctor if she could leave after writer completed interview.   Pt also reports having attempted to elope in past.

## 2019-10-22 NOTE — PROGRESS NOTES
NAVIGATE Clinician Contact & Progress Note  For Individual Resiliency Training (IRT)  A Part of the [facility] First Episode of Psychosis Program    NAVIGATE Enrollee: Ayana Prasad (1990)     MRN: [id number]  Date:  10/22/19  Diagnosis: Schizoaffective disorder, bipolar type (F25.0)  Clinician: JILLIAN Individual Resiliency Trainer, JULISA Ring     1. Type of contact: (majority of time spent)  IRT Session    2. People present:   Writer  Client: Ayana Prasad  NAVIGATE Family Clinician, Sterling Jimenez MA, LMFT (from 8:50-9:30)    3. Length of Actual Contact: Start Time: 8:05; End Time: 9:53   Traveled?    No     4. Location of contact:  [facility], [place]    5. Did the client complete the home practice option(s) from the previous session: Partially Completed    6. Motivational Teaching Strategies:  Connect info and skills with personal goals  Promote hope and positive expectations  Explore pros and cons of change  Re-frame experiences in positive light    7. Educational Teaching Strategies:  Relate information to client's experience  Ask questions to check comprehension  Break down information into small chunks  Adopt client's language     8. CBT Teaching Strategies:  Reinforcement and shaping (gains in knowledge & skills)  Coping skills training (review current coping skills, increase currently used skills and plan home practice)    9. IRT Module(s) Addressed:  Module 4 - Relapse Prevention Planning    10. Techniques utilized:   Clinton announced at beginning of session  Review of previous meeting  Present new material  Problem-solving practice  Help client choose a home practice option  Summarize progress made in current session    11. Mental Status Exam:    Alertness: alert  and oriented  Appearance: casually groomed  Behavior/Demeanor: cooperative and pleasant, with good  eye contact   Speech: normal and regular rate and rhythm  Language: intact. Preferred language identified as  "English.  Psychomotor: restless and fidgety  Mood: depressed and worried  Affect: restricted; was congruent to mood; was congruent to content  Thought Process/Associations: perseverative  Thought Content:  Reports suicidal ideation and preoccupations;  Denies violent ideation  Perception:  Reports auditory hallucinations;  Denies visual hallucinations  Insight: fair  Judgment: fair  Cognition: does  appear grossly intact; formal cognitive testing was not done  Suicidal ideation: reports SI, denies intent, reports plan, denies means  Homicidal Ideation: denies    12. Assessment/Progress Note:      This writer met with Ayana for an IRT session. During the start of the session, Ayana denied suicidal or homicidal thoughts as well as command auditory hallucinations. She discussed recent significant psychosocial stressors within her relationship with her wife. She stated she has not taken medications regularly recently, but was vague in providing details about the frequency. She noted her wife was upset about this, but wants her to \"get over\" the depression. Ayana mentioned her wife, Sergio, has been able to move through depressive thoughts and believes Ayana should be able to as well. Ayana then stated she used Oxycontin and heroin on Friday due to increased stress, but had a \"bad experience\" and symptoms intensified. Ayana denied any substance use since Friday, but reported suicidal thoughts. Ayana was able to tell her mother and Sergio about her suicidal thoughts, but said they do not believe she would benefit from going to the hospital. Ayana noted she feels as though she \"has to choose between family and mental health.\" Ayana noted if she was going to end her life, she would use Oxycontin; however, she denied any current access to means or intent to do so. This writer encouraged Ayana to consider voluntarily presenting at the ER for an assessment. Ayana said she would if her wife approved of this. This writer and Ayana " "then called Sergio during the session. Sergio's first response was that Ayana has \"a lot to do at home.\" This writer provided explanation for the higher level of care and safety considerations. Sergio agreed, but said she does not approve of hospitalization. She then said, \"I don't care. She can do what she wants.\" This writer then assisted Ayana in processing Sergio's response. Ayana requested Sterling, Family Clinician, join the session, as he has spoken to Sergio previously and may be able to give perspective.     This writer ensured Sterling was available to join the session; he agreed. Sterling and this writer re-assessed for safety concerns. Ayana reiterated she will not voluntarily present at the hospital, but is agreeable to returning to the clinic tomorrow for a medication appointment. Ayana was willing to contract for safety and send the safety plan to her wife. Ayana portrayed insight into coping mechanisms that would be helpful to reduce the risk of safety concerns. This safety plan included not using substances, taking medications, telling Sergio or crisis resources if suicidal thoughts change/intensify, returning tomorrow for medication appointment, eating regularly. This writer and Sterling discussed the plan to complete a welfare check if she is unable to attend her appointment tomorrow; Ayana agreed. Sterling then left the session. Ayana then called her wife to verify she received the safety plan; Sergio agreed to review it. This writer provided crisis resources and gave information on how to utilize them. Ayana then asked this writer if she could keep her scheduled appointment immediately following with Linh (SEE). Ayana noted she is failing some classes and wants to talk about strategies to increase grades. This writer encouraged her to be open about her needs, including a reduced appointment time, if needed. This writer then coordinated with Linh (SEE) about the safety plan and Ayana's hopes to keep " "her appointment.     13. Plan/Referrals:     This writer will coordinate with Dr. Hamilton to update prior to Ayana's medication appointment tomorrow.    This writer will continue to provide coping skills and assess safety.     Billing for \"Interactive Complexity\"?    No      JULISA Ring    NAVIGATE Individual Resiliency Trainer    "

## 2019-10-23 ENCOUNTER — TELEPHONE (OUTPATIENT)
Dept: PSYCHIATRY | Facility: CLINIC | Age: 29
End: 2019-10-23

## 2019-10-23 LAB
ALBUMIN SERPL-MCNC: 4 G/DL (ref 3.4–5)
ALP SERPL-CCNC: 52 U/L (ref 40–150)
ALT SERPL W P-5'-P-CCNC: 24 U/L (ref 0–50)
ANION GAP SERPL CALCULATED.3IONS-SCNC: 5 MMOL/L (ref 3–14)
AST SERPL W P-5'-P-CCNC: 12 U/L (ref 0–45)
BILIRUB SERPL-MCNC: 0.5 MG/DL (ref 0.2–1.3)
BUN SERPL-MCNC: 13 MG/DL (ref 7–30)
CALCIUM SERPL-MCNC: 9.2 MG/DL (ref 8.5–10.1)
CHLORIDE SERPL-SCNC: 112 MMOL/L (ref 94–109)
CHOLEST SERPL-MCNC: 230 MG/DL
CO2 SERPL-SCNC: 23 MMOL/L (ref 20–32)
CREAT SERPL-MCNC: 0.87 MG/DL (ref 0.52–1.04)
ERYTHROCYTE [DISTWIDTH] IN BLOOD BY AUTOMATED COUNT: 13.3 % (ref 10–15)
FOLATE SERPL-MCNC: 16.9 NG/ML
GFR SERPL CREATININE-BSD FRML MDRD: 90 ML/MIN/{1.73_M2}
GLUCOSE SERPL-MCNC: 99 MG/DL (ref 70–99)
HCT VFR BLD AUTO: 43.6 % (ref 35–47)
HDLC SERPL-MCNC: 58 MG/DL
HGB BLD-MCNC: 14.3 G/DL (ref 11.7–15.7)
LDLC SERPL CALC-MCNC: 130 MG/DL
LITHIUM SERPL-SCNC: 0.45 MMOL/L (ref 0.6–1.2)
MCH RBC QN AUTO: 27.8 PG (ref 26.5–33)
MCHC RBC AUTO-ENTMCNC: 32.8 G/DL (ref 31.5–36.5)
MCV RBC AUTO: 85 FL (ref 78–100)
NONHDLC SERPL-MCNC: 172 MG/DL
PLATELET # BLD AUTO: 313 10E9/L (ref 150–450)
POTASSIUM SERPL-SCNC: 3.9 MMOL/L (ref 3.4–5.3)
PREALB SERPL IA-MCNC: 36 MG/DL (ref 15–45)
PROT SERPL-MCNC: 8 G/DL (ref 6.8–8.8)
RBC # BLD AUTO: 5.15 10E12/L (ref 3.8–5.2)
SODIUM SERPL-SCNC: 140 MMOL/L (ref 133–144)
TRIGL SERPL-MCNC: 211 MG/DL
TSH SERPL DL<=0.005 MIU/L-ACNC: 3.62 MU/L (ref 0.4–4)
VIT B12 SERPL-MCNC: 846 PG/ML (ref 193–986)
WBC # BLD AUTO: 10.9 10E9/L (ref 4–11)

## 2019-10-23 PROCEDURE — 85027 COMPLETE CBC AUTOMATED: CPT | Performed by: STUDENT IN AN ORGANIZED HEALTH CARE EDUCATION/TRAINING PROGRAM

## 2019-10-23 PROCEDURE — 80178 ASSAY OF LITHIUM: CPT | Performed by: STUDENT IN AN ORGANIZED HEALTH CARE EDUCATION/TRAINING PROGRAM

## 2019-10-23 PROCEDURE — 99223 1ST HOSP IP/OBS HIGH 75: CPT | Mod: AI | Performed by: PSYCHIATRY & NEUROLOGY

## 2019-10-23 PROCEDURE — 82746 ASSAY OF FOLIC ACID SERUM: CPT | Performed by: STUDENT IN AN ORGANIZED HEALTH CARE EDUCATION/TRAINING PROGRAM

## 2019-10-23 PROCEDURE — 84443 ASSAY THYROID STIM HORMONE: CPT | Performed by: STUDENT IN AN ORGANIZED HEALTH CARE EDUCATION/TRAINING PROGRAM

## 2019-10-23 PROCEDURE — 25000132 ZZH RX MED GY IP 250 OP 250 PS 637: Performed by: STUDENT IN AN ORGANIZED HEALTH CARE EDUCATION/TRAINING PROGRAM

## 2019-10-23 PROCEDURE — 80053 COMPREHEN METABOLIC PANEL: CPT | Performed by: STUDENT IN AN ORGANIZED HEALTH CARE EDUCATION/TRAINING PROGRAM

## 2019-10-23 PROCEDURE — 84134 ASSAY OF PREALBUMIN: CPT | Performed by: STUDENT IN AN ORGANIZED HEALTH CARE EDUCATION/TRAINING PROGRAM

## 2019-10-23 PROCEDURE — 80061 LIPID PANEL: CPT | Performed by: STUDENT IN AN ORGANIZED HEALTH CARE EDUCATION/TRAINING PROGRAM

## 2019-10-23 PROCEDURE — 12400001 ZZH R&B MH UMMC

## 2019-10-23 PROCEDURE — H2032 ACTIVITY THERAPY, PER 15 MIN: HCPCS

## 2019-10-23 PROCEDURE — 82607 VITAMIN B-12: CPT | Performed by: STUDENT IN AN ORGANIZED HEALTH CARE EDUCATION/TRAINING PROGRAM

## 2019-10-23 PROCEDURE — 36415 COLL VENOUS BLD VENIPUNCTURE: CPT | Performed by: STUDENT IN AN ORGANIZED HEALTH CARE EDUCATION/TRAINING PROGRAM

## 2019-10-23 PROCEDURE — 25000132 ZZH RX MED GY IP 250 OP 250 PS 637: Performed by: PSYCHIATRY & NEUROLOGY

## 2019-10-23 RX ORDER — FLUPHENAZINE HYDROCHLORIDE 1 MG/1
1 TABLET ORAL EVERY MORNING
Status: DISCONTINUED | OUTPATIENT
Start: 2019-10-24 | End: 2019-11-12

## 2019-10-23 RX ORDER — TRAZODONE HYDROCHLORIDE 50 MG/1
50 TABLET, FILM COATED ORAL
Status: ON HOLD | COMMUNITY
End: 2019-12-13

## 2019-10-23 RX ORDER — FLUPHENAZINE HYDROCHLORIDE 1 MG/1
3 TABLET ORAL AT BEDTIME
Status: DISCONTINUED | OUTPATIENT
Start: 2019-10-23 | End: 2019-10-24

## 2019-10-23 RX ADMIN — GABAPENTIN 600 MG: 300 CAPSULE ORAL at 13:08

## 2019-10-23 RX ADMIN — LORAZEPAM 1 MG: 1 TABLET ORAL at 16:33

## 2019-10-23 RX ADMIN — NICOTINE POLACRILEX 8 MG: 4 GUM, CHEWING ORAL at 12:30

## 2019-10-23 RX ADMIN — LORAZEPAM 1 MG: 1 TABLET ORAL at 08:01

## 2019-10-23 RX ADMIN — NICOTINE POLACRILEX 8 MG: 4 GUM, CHEWING ORAL at 09:32

## 2019-10-23 RX ADMIN — NICOTINE POLACRILEX 8 MG: 4 GUM, CHEWING ORAL at 14:41

## 2019-10-23 RX ADMIN — LITHIUM CARBONATE 300 MG: 300 TABLET, EXTENDED RELEASE ORAL at 07:56

## 2019-10-23 RX ADMIN — GABAPENTIN 600 MG: 300 CAPSULE ORAL at 07:56

## 2019-10-23 RX ADMIN — FLUPHENAZINE HYDROCHLORIDE 1 MG: 1 TABLET, FILM COATED ORAL at 07:56

## 2019-10-23 RX ADMIN — FLUPHENAZINE HYDROCHLORIDE 3 MG: 1 TABLET, FILM COATED ORAL at 21:59

## 2019-10-23 RX ADMIN — NICOTINE POLACRILEX 4 MG: 4 GUM, CHEWING ORAL at 07:56

## 2019-10-23 RX ADMIN — GABAPENTIN 600 MG: 300 CAPSULE ORAL at 21:59

## 2019-10-23 RX ADMIN — NICOTINE 1 PATCH: 14 PATCH, EXTENDED RELEASE TRANSDERMAL at 07:55

## 2019-10-23 RX ADMIN — LITHIUM CARBONATE 600 MG: 300 TABLET, EXTENDED RELEASE ORAL at 21:59

## 2019-10-23 ASSESSMENT — ACTIVITIES OF DAILY LIVING (ADL)
ORAL_HYGIENE: INDEPENDENT
LAUNDRY: WITH SUPERVISION
HYGIENE/GROOMING: HANDWASHING;SHOWER;INDEPENDENT
LAUNDRY: WITH SUPERVISION
HYGIENE/GROOMING: INDEPENDENT
DRESS: STREET CLOTHES;INDEPENDENT
DRESS: INDEPENDENT
ORAL_HYGIENE: INDEPENDENT

## 2019-10-23 ASSESSMENT — ANXIETY QUESTIONNAIRES: GAD7 TOTAL SCORE: 17
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
## 2019-10-23 NOTE — PROGRESS NOTES
Pt was visible in the milieu, minimally social with peers, withdrawn. Pt reports active thoughts of SI and SIB, stating if they had a chance to harm themselves in their room they would. Pt reports anxiety and depression, did not contract for safety. Pt continues on SIO, requested to have it discontinued. Pt attended groups throughout the day, orientated to day and location. Pt reports auditory hallucinations telling them to kill themselves.      10/23/19 1411   Behavioral Health   Thinking distractable   Orientation person: oriented;date: oriented;place: oriented;time: oriented   Memory baseline memory   Insight admits / accepts   Judgement impaired   Eye Contact at examiner   Affect blunted, flat;sad   Mood anxious   Physical Appearance/Attire attire appropriate to age and situation   Hygiene other (see comment)  (adequate )   Suicidality chronic thoughts with no stated plan   1. Wish to be Dead (Past Month) Yes   2. Non-Specific Active Suicidal Thoughts (Past Month) Yes   Self Injury chronic thoughts with no stated plan   Elopement Statements about wanting to leave   Activity other (see comment)  (visible in the milieu)   Speech coherent;clear   Medication Sensitivity no stated side effects;no observed side effects   Psychomotor / Gait balanced;steady   Psycho Education   Type of Intervention 1:1 intervention   Response participates, initiates socially appropriate   Hours 0.5   Treatment Detail check in    Activities of Daily Living   Hygiene/Grooming independent   Oral Hygiene independent   Dress independent   Laundry with supervision   Room Organization independent   Activity   Activity Assistance Provided independent

## 2019-10-23 NOTE — PROGRESS NOTES
Pt shared that she hasn't been eating due to feeling unable to get out of bed from anxiety and depression issues.  She also stated that her marriage may be ending as her wife texted her she wants a divorce, but then said she's willing to work it out.  Pt felt unsure about the relationship due to feeling unsupported while in the hospital.  She asserts she is in the midst of transition either way and needs to figure out better ways of dealing with her symptoms.    Within this session, she explored anxious movements by intensifying and organizing them into movements that are more self-directed, implementing control by adjusting one quality of the movement rather than attempting to change everything at once.  Since this is a return patient for this therapist, this smaller theme has played out of the course of multiple hospitalizations and pt treatment process as she has gradually opened up to the processes of embodied psychotherapy.   This session the pt demonstrated increased self-determination and a greater motivation to learn tools to control symptoms.         10/23/19 1115   Dance Movement Therapy   Type of Intervention structured groups   Response participates, initiates socially appropriate   Hours 1

## 2019-10-23 NOTE — PROGRESS NOTES
Brief Medicine Note  October 23, 2019    Primary team contacted me regarding patients chronic urinary retention. Per chart review, pt is a 29yo F with a hx of ADHD, bipolar 1 disorder, borderline personality disorder, depression, polysubstance abuse, PTSD, cauda equina syndrome with chronic urinary retention following. Last seen by urology 7/2019, pt reported having bladder sensation issues x 3 years. The plan was for patient to [facility] 4-6x per day with plan for urodynamic +/- fluoro at next available. She has not undergone urodynamics since. She continues to have urinary retention, PVR on admission ~300, not >450. Reviewed chart, no obvious med culprits. Ensure patient is not constipated and having regular bowels as this can contribute. Patient needs to follow up with urology for urodynamic +/- fluoro on discharge - attempted to schedule but they stated patient will need to make appt. No need for referral as she already has established care.    Jyothi Quiñonez PA-C

## 2019-10-23 NOTE — PROGRESS NOTES
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      Specify if:     Severe: Presence of 6 or more symptoms

## 2019-10-23 NOTE — PROGRESS NOTES
Case Management Note    Writer met with patient to complete Rule 25 assessment.  She was collaborative in the assessment, but verbalized desire for civil commitment, not MI/CD treatment at this time.  She says that no one at this time knows about her use, and she is not going to tell them about it.  She states that she thinks  civil commitment is a better option for her than CD treatment.  Writer and patient discussed pros and cons of MI/CD treatment as well as civil commitment.      Writer reported that due to the relative stress involved in her housing, marriage and financial circumstances, recommendations include not using any unprescribed substances and residential MI/CD treatment to support ongoing stabilization post hospitalization.  Patient verbalized desire to table the conversation about going to treatment and declined to sign VAL's for referrals.

## 2019-10-23 NOTE — PROGRESS NOTES
"CLINICAL NUTRITION SERVICES - ASSESSMENT NOTE     Nutrition Prescription    RECOMMENDATIONS FOR MDs/PROVIDERS TO ORDER:  Encourage PO intake     Malnutrition Status:    Severe malnutrition in the context of mental illness.     Recommendations already ordered by Registered Dietitian (RD):  Order BID Boost with meals (Strawberry with breakfast, vanilla with lunch)    Future/Additional Recommendations:  - Continue to monitor PO intake and wt trends  - Adjust supplement regimen as desired by the pt     REASON FOR ASSESSMENT  Ayana Prasad is a/an 29 year old female assessed by the dietitian for Admission Nutrition Risk Screen for reduced oral intake over the last month    PMH of schizoaffective disorder (bipolar type), PTSD, ADHD, borderline personality disorder, and polysubstance use who presented on 10/22/2019 due to increasing CAH and SI in the setting of multiple acute stressors.  NUTRITION HISTORY  Per RN note from 10/2, pt reports not eating 'in a week'.    Pt reports not taking anything PO except water for the 7 days leading up to admission. The pt says she has access to food and they her mother-in-law does the grocery shopping and food preparation. The pt says her appetite is greatly affected by her anxiety/depression and has been poor for the last several months. Even when her appetite is good, the pt only eats 1-2 meals/day.     CURRENT NUTRITION ORDERS  Diet: Regular  Intake/Tolerance: Pt reports having a poor appetite and eating about 20% of breakfast. She is open to getting nutrition supplements.     LABS  Labs reviewed    MEDICATIONS  Medications reviewed  Zofran     ANTHROPOMETRICS  Height: 165.1 cm (5' 5\")  Most Recent Weight: 88.5 kg (195 lb 1.6 oz)    IBW: 56.8 kg (156%)  BMI: 32.5 Obesity Grade I BMI 30-34.9  Weight History: Over the last month, the pt has lost 6# (3%).   Wt Readings from Last 10 Encounters:   10/22/19 88.5 kg (195 lb 1.6 oz)   10/09/19 93.3 kg (205 lb 9.6 oz)   09/16/19 91.2 kg (201 " lb)   08/02/19 95.1 kg (209 lb 9.6 oz)   07/22/19 91.4 kg (201 lb 6.4 oz)   07/05/19 90.7 kg (200 lb)   06/26/19 90.7 kg (200 lb)   06/24/19 93.4 kg (206 lb)   06/24/19 86.2 kg (190 lb)   06/17/19 96 kg (211 lb 9.6 oz)       Dosing Weight: 65 kg (adjusted based on IBW of 56.8 kg and most recent wt of 88.5 kg)     ASSESSED NUTRITION NEEDS  Estimated Energy Needs: 7329-1876 kcals/day (20 - 25 kcals/kg)  Justification: Obese  Estimated Protein Needs: 65-78 grams protein/day (1 - 1.2 grams of pro/kg)  Justification: Obesity guidelines  Estimated Fluid Needs: (1 mL/kcal)   Justification: Maintenance    PHYSICAL FINDINGS  See malnutrition section below.    MALNUTRITION  % Intake: </= 50% for >/= 5 days (severe)  % Weight Loss: Up to 5% in 1 month (non-severe)  Subcutaneous Fat Loss: None observed  Muscle Loss: None observed  Fluid Accumulation/Edema: None noted  Malnutrition Diagnosis: Severe malnutrition in the context of mental illness.     NUTRITION DIAGNOSIS  Inadequate protein-energy intake related to poor appetite as evidenced by the pt consuming nothing PO 7 days prior to admission except water.       INTERVENTIONS  Implementation  - Nutrition Education: Provided education on the importance of adequate protein/energy intake and roll of RD.  - Encourage the pt to eating TID this admission.    - Medical food supplement therapy - BID Boost with meals      Goals  Patient to consume % of nutritionally adequate meal trays TID, or the equivalent with supplements/snacks.     Monitoring/Evaluation  Progress toward goals will be monitored and evaluated per protocol.      Lauryn Simmons RD, LD  Pager: [phone]

## 2019-10-23 NOTE — PROGRESS NOTES
"Last bladder scan at 2012 10/22. Pt. has been observed sleeping since the beginning of the night shift to now. Pt. encouraged to use the bathroom at around 0200 but she declined. Pt. states \"I just used the bathroom an hour ago\". Pt preferred to sleep and asked staff to do it later.   Bladder scan at 0650 10/23 was 350 mL.  "

## 2019-10-23 NOTE — PROGRESS NOTES
Ayana remains on SIO due to continued SIO-continues to comment she would harm self if opportunity available

## 2019-10-23 NOTE — PLAN OF CARE
Problem: Adult Behavioral Health Plan of Care  Goal: Patient-Specific Goal (Individualization)  Outcome: No Change  Flowsheets (Taken 10/23/2019 1019)  Patient Personal Strengths: expressive of emotions; intellectual cognitive skills; resourceful  Patient Vulnerabilities: Recent substance use, SPMI, low levels of social support, housing, relationship and financial isntability  Note:   Personal Plan of Care    Reasons you are in the Hospital  Suicidal Thoughts  2.  3.  4.    Goals for Discharge   To get better  2.  3.    BEHAVIORAL TEAM DISCUSSION    Participants: Joao Morin MD, Vangie Gonzalez RN, Cristina Hannah CTC - Medical Residentials, <Medical students, [place] students  Progress: New admission  Anticipated length of stay: 3-5 days  Continued Stay Criteria/Rationale: Stabilization, evaluation, assessment  Medical/Physical: will evaluate and assess  Precautions:   Behavioral Orders   Procedures    Assault precautions    Code 1 - Restrict to Unit    Elopement precautions    Elopement precautions    Routine Programming     As clinically indicated    Self Injury Precaution    Single Room    Status 15     Every 15 minutes.    Status Individual Observation     Order Specific Question:   CONTINUOUS 24 hours / day     Answer:   5 feet     Order Specific Question:   Indications for SIO     Answer:   Self-injury risk     Order Specific Question:   Indications for SIO     Answer:   Suicide risk     Order Specific Question:   Indications for SIO     Answer:   Medical equipment / ligature risk    Suicide precautions     Patients on Suicide Precautions should have a Combination Diet ordered that includes a Diet selection(s) AND a Behavioral Tray selection for Safe Tray - with utensils, or Safe Tray - NO utensils       Plan: evaluation and assessment  Rationale for change in precautions or plan: N/A-new admission
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
## 2019-10-23 NOTE — TELEPHONE ENCOUNTER
"[facility] SEE Outgoing Telephone Call  For Supported Employment & Education    NAVIGATE Enrollee: Ayana Prasad (1990)     MRN: [id number]  Date of Call: 10/23/2019  Contacted: Ayana on [facility]    Discussed:   Writer and Ayana discussed how her stay has been and ayana reports that she feels \"a little bit better\" and that she would like this writer to call her employer and inform them that she is hospitalized and to see if they can push back her start date.       Linh Hamilton    "

## 2019-10-24 ENCOUNTER — VIRTUAL VISIT (OUTPATIENT)
Dept: PSYCHIATRY | Facility: CLINIC | Age: 29
End: 2019-10-24
Payer: COMMERCIAL

## 2019-10-24 DIAGNOSIS — F25.0 SCHIZOAFFECTIVE DISORDER, BIPOLAR TYPE (H): Primary | ICD-10-CM

## 2019-10-24 PROCEDURE — 25000132 ZZH RX MED GY IP 250 OP 250 PS 637: Performed by: PSYCHIATRY & NEUROLOGY

## 2019-10-24 PROCEDURE — 12400001 ZZH R&B MH UMMC

## 2019-10-24 PROCEDURE — G0177 OPPS/PHP; TRAIN & EDUC SERV: HCPCS

## 2019-10-24 PROCEDURE — 25000132 ZZH RX MED GY IP 250 OP 250 PS 637: Performed by: STUDENT IN AN ORGANIZED HEALTH CARE EDUCATION/TRAINING PROGRAM

## 2019-10-24 PROCEDURE — 99232 SBSQ HOSP IP/OBS MODERATE 35: CPT | Mod: GC | Performed by: PSYCHIATRY & NEUROLOGY

## 2019-10-24 RX ORDER — FLUPHENAZINE HYDROCHLORIDE 1 MG/1
4 TABLET ORAL AT BEDTIME
Status: DISCONTINUED | OUTPATIENT
Start: 2019-10-24 | End: 2019-11-01

## 2019-10-24 RX ADMIN — FLUPHENAZINE HYDROCHLORIDE 1 MG: 1 TABLET, FILM COATED ORAL at 09:01

## 2019-10-24 RX ADMIN — GABAPENTIN 600 MG: 300 CAPSULE ORAL at 09:01

## 2019-10-24 RX ADMIN — NICOTINE POLACRILEX 8 MG: 4 GUM, CHEWING ORAL at 13:15

## 2019-10-24 RX ADMIN — LORAZEPAM 1 MG: 1 TABLET ORAL at 11:56

## 2019-10-24 RX ADMIN — FLUPHENAZINE HYDROCHLORIDE 4 MG: 1 TABLET, FILM COATED ORAL at 22:05

## 2019-10-24 RX ADMIN — GABAPENTIN 600 MG: 300 CAPSULE ORAL at 13:15

## 2019-10-24 RX ADMIN — GABAPENTIN 600 MG: 300 CAPSULE ORAL at 22:05

## 2019-10-24 RX ADMIN — OLANZAPINE 10 MG: 10 TABLET, FILM COATED ORAL at 13:15

## 2019-10-24 RX ADMIN — LITHIUM CARBONATE 600 MG: 300 TABLET, EXTENDED RELEASE ORAL at 22:05

## 2019-10-24 RX ADMIN — NICOTINE POLACRILEX 8 MG: 4 GUM, CHEWING ORAL at 09:01

## 2019-10-24 RX ADMIN — NICOTINE POLACRILEX 4 MG: 4 GUM, CHEWING ORAL at 11:56

## 2019-10-24 RX ADMIN — LITHIUM CARBONATE 300 MG: 300 TABLET, EXTENDED RELEASE ORAL at 09:01

## 2019-10-24 RX ADMIN — NICOTINE 1 PATCH: 14 PATCH, EXTENDED RELEASE TRANSDERMAL at 08:58

## 2019-10-24 ASSESSMENT — ACTIVITIES OF DAILY LIVING (ADL)
HYGIENE/GROOMING: INDEPENDENT
LAUNDRY: WITH SUPERVISION
ORAL_HYGIENE: INDEPENDENT
DRESS: INDEPENDENT
DRESS: INDEPENDENT
ORAL_HYGIENE: INDEPENDENT
HYGIENE/GROOMING: INDEPENDENT

## 2019-10-24 ASSESSMENT — MIFFLIN-ST. JEOR: SCORE: 1622.64

## 2019-10-24 NOTE — PLAN OF CARE
Ayana has remained on SIO staffing this evening. Her affect remains blunted/flat. She has been pleasant in interactions with staff. Has made no comments that she would like to harm herself. At shift change, she asked some questions regarding how to leave unit. She attended art therapy group, watched TV, ate dinner with peers.

## 2019-10-24 NOTE — PROGRESS NOTES
" 10/23/19 2200   Art Therapy   Type of Intervention structured groups   Response Participates with encouragement    Hours 1   Treatment Detail    Art Therapy - FALL inspired art- mandala   Problem- Principal psychiatric diagnosis:   # Schizoaffective disorder, bipolar type, current episode depressive  # r/o Substance-induce mood and psychotic disorder     Secondary psychiatric diagnoses:   # PTSD  # Borderline personality disorder  # Polysubstance abuse  # ADHD        Goal-[place], express, regulate, sublimate through Art Therapy directives.     Outcome- Pt was pleasant and cooperative. She enjoyed working with plants and making a mandala. She enjoyed the social aspect of group. Writer felt her affect was slightly brighter when creating art and selecting music for the group. She said she had a conversation with her  from the community.When asked how she was feeling she said \" I don't know.\" She did say she was enjoying socializing with her roommate and that they had a lot in common.  "
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
## 2019-10-24 NOTE — PLAN OF CARE
Ayana continues on SIO due to continued report of SI and auditory hallucinations telling her to harm herself-continues to feel she will harm self if means available-continues to report depressed mood-Is involved in unit activities including spontaneous activities with peers such as games-social with peers-did c/o anxiety related to continued hospitalization, requested Ativan 1 mg PRN for c/o excessive anxiety at 1156 with small amt relief-requested Zyprexa 10 mg PO at 1315 for auditory hallucinations telling her to kill herself which are bothersome to her

## 2019-10-24 NOTE — PROGRESS NOTES
Observed to have slept well for approx 7.0 hrs. throughout the night w/ SIO staff continuously present r/t  suicide risk.  Wakeful, up for voiding in BR just after 0630 this am;  Voiding noted to be adequate.  Engaging in no conversation - ret'd readily to bed.  No acute distress observed.  Safe, therapeutic environment maintained.

## 2019-10-25 PROCEDURE — 25000132 ZZH RX MED GY IP 250 OP 250 PS 637: Performed by: STUDENT IN AN ORGANIZED HEALTH CARE EDUCATION/TRAINING PROGRAM

## 2019-10-25 PROCEDURE — 90837 PSYTX W PT 60 MINUTES: CPT

## 2019-10-25 PROCEDURE — 25000132 ZZH RX MED GY IP 250 OP 250 PS 637: Performed by: PSYCHIATRY & NEUROLOGY

## 2019-10-25 PROCEDURE — 12400001 ZZH R&B MH UMMC

## 2019-10-25 PROCEDURE — G0177 OPPS/PHP; TRAIN & EDUC SERV: HCPCS

## 2019-10-25 PROCEDURE — H2032 ACTIVITY THERAPY, PER 15 MIN: HCPCS

## 2019-10-25 RX ORDER — TRIFLUOPERAZINE HYDROCHLORIDE 1 MG/1
1 TABLET, FILM COATED ORAL
Status: DISCONTINUED | OUTPATIENT
Start: 2019-10-25 | End: 2019-11-01

## 2019-10-25 RX ORDER — TRIFLUOPERAZINE HYDROCHLORIDE 1 MG/1
1 TABLET, FILM COATED ORAL
Status: DISCONTINUED | OUTPATIENT
Start: 2019-10-25 | End: 2019-10-25

## 2019-10-25 RX ADMIN — LORAZEPAM 1 MG: 1 TABLET ORAL at 11:25

## 2019-10-25 RX ADMIN — GABAPENTIN 600 MG: 300 CAPSULE ORAL at 20:35

## 2019-10-25 RX ADMIN — GABAPENTIN 600 MG: 300 CAPSULE ORAL at 09:44

## 2019-10-25 RX ADMIN — NICOTINE POLACRILEX 8 MG: 4 GUM, CHEWING ORAL at 09:46

## 2019-10-25 RX ADMIN — NICOTINE 1 PATCH: 14 PATCH, EXTENDED RELEASE TRANSDERMAL at 09:45

## 2019-10-25 RX ADMIN — LORAZEPAM 1 MG: 1 TABLET ORAL at 18:27

## 2019-10-25 RX ADMIN — FLUPHENAZINE HYDROCHLORIDE 4 MG: 1 TABLET, FILM COATED ORAL at 20:35

## 2019-10-25 RX ADMIN — LITHIUM CARBONATE 600 MG: 300 TABLET, EXTENDED RELEASE ORAL at 20:35

## 2019-10-25 RX ADMIN — LITHIUM CARBONATE 300 MG: 300 TABLET, EXTENDED RELEASE ORAL at 09:45

## 2019-10-25 RX ADMIN — GABAPENTIN 600 MG: 300 CAPSULE ORAL at 14:09

## 2019-10-25 RX ADMIN — TRIFLUOPERAZINE HYDROCHLORIDE 1 MG: 1 TABLET, FILM COATED ORAL at 16:03

## 2019-10-25 RX ADMIN — TRAZODONE HYDROCHLORIDE 50 MG: 50 TABLET ORAL at 20:38

## 2019-10-25 RX ADMIN — NICOTINE POLACRILEX 8 MG: 4 GUM, CHEWING ORAL at 13:38

## 2019-10-25 RX ADMIN — FLUPHENAZINE HYDROCHLORIDE 1 MG: 1 TABLET, FILM COATED ORAL at 11:46

## 2019-10-25 RX ADMIN — NICOTINE POLACRILEX 8 MG: 4 GUM, CHEWING ORAL at 18:27

## 2019-10-25 RX ADMIN — NICOTINE POLACRILEX 8 MG: 4 GUM, CHEWING ORAL at 11:25

## 2019-10-25 ASSESSMENT — ACTIVITIES OF DAILY LIVING (ADL)
ORAL_HYGIENE: INDEPENDENT
HYGIENE/GROOMING: INDEPENDENT
LAUNDRY: WITH SUPERVISION
DRESS: INDEPENDENT
ORAL_HYGIENE: INDEPENDENT
HYGIENE/GROOMING: INDEPENDENT
DRESS: INDEPENDENT;STREET CLOTHES

## 2019-10-25 NOTE — PROGRESS NOTES
"INITIAL O.T. ASSESSMENT:  Ayana has attended OT frequently since admission.    Today  attended 2 of 3 OT groups. Wrote a letter to her partner Sergio, which she said she \"intended to pour [her] heart into, but got 'blocked' [facility] through.\" Made a musical selection to the group. Chatted brightly with peers.      Was given and completed a written self assessment. Cited \"suicidal thoughts & loud voices\" as the reason for her current hospitalization. When asked what staff can do to be most helpful during hospitalization, pt responded 'talking/kindness.\"     Goals prioritized for hospitalization (selected from list): Getting through this; Self-care; Sleep quality; Self-compassion; Finding escobar    Goals prioritized for hospitalization (self-described): \"To get better\"    OT staff explained the purpose of pt being involved in current treatment plan.      Plan: Encourage ongoing attendance as able to meet self-stated goals, implement positive coping skills, engage in graded opportunities for success, and provide assessment ongoing.       10/25/19 1600   Clinical Impression   Affect Appropriate to situation   Orientation Oriented to person, place and time   Appearance and ADLs General cleanliness observed in most areas   Attention to Internal Stimuli Appears distracted/preoccupied internally   Interaction Skills Interacts appropriately with staff;Interacts with prompts, minimal response   Ability to Communicate Needs Independent;Needs further assessment   Verbal Content Appropriate to topic;Selective   Ability to Maintain Boundaries Maintains appropriate physical boundaries;Maintains appropriate verbal boundaries   Participation Participates with minimal encouragement   Concentration Concentrates 20-30 minutes;Needs further assessment   Ability to Concentrate With structure;Preoccupied   Follows and Comprehends Directions Independently follows 1 step verbal directions   Memory Needs further assessment   Organization " Independently organizes simple tasks;Needs further assessment   Decision Making Independent;Needs further assessment   Planning and Problem Solving Indentifies problems but not alternatives   Ability to Apply and Learn Concepts Applies within group structure   Frustrations / Stress Tolerance Independently identifies sources of frustration/stress;Utilizes coping skills with prompts   Level of Insight Identifies needs with structure/support;Some insight;Needs further assessment   Self Esteem Takes risks with support and encouragement;Accepts positive feedback   Social Supports Needs further assessment

## 2019-10-25 NOTE — PROGRESS NOTES
10/25/19 1800   Therapeutic Recreation   Type of Intervention structured groups   Activity game   Response Participates, initiates socially appropriate   Hours 1     Pt participated in Therapeutic Recreation group with focus on leisure participation, social engagement, and critical thinking. Engaged and focused in the group recreational intervention via a group game.  Pt was a full participant throughout the entire duration of group. Showed progress in session goals. Pt mood was calm. Appropriately shared sense of humor with peers during groups, and appeared to brighten with social interaction. Pt stated she enjoyed the activity when group was over.

## 2019-10-25 NOTE — PROGRESS NOTES
Writer was 1:1 staff with patient for the night shift. The patient appeared to be asleep at the beginning of the shift. She remained asleep throughout the shift with no issues.
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
## 2019-10-25 NOTE — PROGRESS NOTES
"Pt visible in milieu, social with peer, smiling and laughing with peer.  Pt still has general thoughts of SI/SIB, says she feels pressured to be safe in the hospital.  Pt ate meals.  Pt didn't want to shower because she didn't feel comfortable with having the SIO staff with her.         10/25/19 1500   Behavioral Health   Hallucinations denies / not responding to hallucinations   Thinking intact   Orientation person: oriented;place: oriented   Memory baseline memory   Insight poor   Judgement impaired   Eye Contact at examiner   Affect full range affect   Mood mood is calm;depressed   Physical Appearance/Attire attire appropriate to age and situation   Hygiene other (see comment)  (fair)   Suicidality thoughts only   1. Wish to be Dead (Past Month) Yes   2. Non-Specific Active Suicidal Thoughts (Past Month) Yes   Non-Specific Active Suicidal Thought Description (Recent)   (\"general thoughts\")   Self Injury thoughts only   Activity other (see comment)  (present in milieu)   Speech clear;coherent   Medication Sensitivity no observed side effects   Psychomotor / Gait balanced;steady   Psycho Education   Type of Intervention 1:1 intervention   Response observes from a distance   Activities of Daily Living   Hygiene/Grooming independent   Oral Hygiene independent   Dress independent;street clothes   Room Organization independent     "

## 2019-10-25 NOTE — PROGRESS NOTES
"Pt was observed withdrawn to their room for the majority of the evening resting. Pt came out of their room to eat dinner, then sat briefly in the lounge to watch TV with a peer who invited them to do so. While in the lounge pt was observed to socialize minimally only talking to peer when peer initiated conversation. Pt presented reserved with staff as well socializing with them with single word phrases. Writer attempted to check-in with pt, pt stated \"no\". Writer attempted to ask pt questions to which pt would reply with tired moans. No other concerns were reported to staff by the pt.      10/24/19 2024   Behavioral Health   Hallucinations   (DIANNA)   Thinking distractable   Orientation person: oriented;place: oriented   Memory baseline memory   Insight poor   Judgement impaired   Eye Contact at examiner   Affect blunted, flat;sad   Mood depressed   Physical Appearance/Attire attire appropriate to age and situation   Hygiene   (adequate)   Suicidality   (DIANNA pt refused check-in)   Self Injury   (DIANNA)   Elopement   (None apparent risk)   Activity withdrawn   Speech clear;coherent   Medication Sensitivity no stated side effects;no observed side effects   Psychomotor / Gait balanced;steady   Psycho Education   Type of Intervention 1:1 intervention   Response refuses   Treatment Detail Pt refused check-in   Activities of Daily Living   Hygiene/Grooming independent   Oral Hygiene independent   Dress independent   Room Organization independent     "

## 2019-10-25 NOTE — PROGRESS NOTES
"Writer asked patient suicide risk questions 2 hours after SIO was DCed. Patient answered yes to all 6 of the suicide risk questions. Patient said felt safe here. Writer asked patient if found something to hurt herself with would she notify staff before hurting herself and patient answered \"probably not\". Patient was put back on SIO.  "

## 2019-10-26 PROCEDURE — 25000132 ZZH RX MED GY IP 250 OP 250 PS 637: Performed by: PSYCHIATRY & NEUROLOGY

## 2019-10-26 PROCEDURE — 25000132 ZZH RX MED GY IP 250 OP 250 PS 637: Performed by: STUDENT IN AN ORGANIZED HEALTH CARE EDUCATION/TRAINING PROGRAM

## 2019-10-26 PROCEDURE — 12400001 ZZH R&B MH UMMC

## 2019-10-26 RX ORDER — DOCOSANOL 100 MG/G
CREAM TOPICAL 4 TIMES DAILY PRN
Status: DISCONTINUED | OUTPATIENT
Start: 2019-10-26 | End: 2019-12-17 | Stop reason: HOSPADM

## 2019-10-26 RX ADMIN — GABAPENTIN 600 MG: 300 CAPSULE ORAL at 13:21

## 2019-10-26 RX ADMIN — NICOTINE 1 PATCH: 14 PATCH, EXTENDED RELEASE TRANSDERMAL at 09:38

## 2019-10-26 RX ADMIN — FLUPHENAZINE HYDROCHLORIDE 1 MG: 1 TABLET, FILM COATED ORAL at 09:37

## 2019-10-26 RX ADMIN — NICOTINE POLACRILEX 8 MG: 4 GUM, CHEWING ORAL at 19:17

## 2019-10-26 RX ADMIN — TRIFLUOPERAZINE HYDROCHLORIDE 1 MG: 1 TABLET, FILM COATED ORAL at 13:21

## 2019-10-26 RX ADMIN — GABAPENTIN 600 MG: 300 CAPSULE ORAL at 20:06

## 2019-10-26 RX ADMIN — NICOTINE POLACRILEX 8 MG: 4 GUM, CHEWING ORAL at 09:45

## 2019-10-26 RX ADMIN — GABAPENTIN 600 MG: 300 CAPSULE ORAL at 09:37

## 2019-10-26 RX ADMIN — NICOTINE POLACRILEX 8 MG: 4 GUM, CHEWING ORAL at 13:22

## 2019-10-26 RX ADMIN — TRIFLUOPERAZINE HYDROCHLORIDE 1 MG: 1 TABLET, FILM COATED ORAL at 19:56

## 2019-10-26 RX ADMIN — LORAZEPAM 1 MG: 1 TABLET ORAL at 19:56

## 2019-10-26 RX ADMIN — TRAZODONE HYDROCHLORIDE 50 MG: 50 TABLET ORAL at 22:51

## 2019-10-26 RX ADMIN — TRAZODONE HYDROCHLORIDE 50 MG: 50 TABLET ORAL at 20:46

## 2019-10-26 RX ADMIN — NICOTINE POLACRILEX 8 MG: 4 GUM, CHEWING ORAL at 21:07

## 2019-10-26 RX ADMIN — LITHIUM CARBONATE 300 MG: 300 TABLET, EXTENDED RELEASE ORAL at 09:37

## 2019-10-26 RX ADMIN — NICOTINE POLACRILEX 8 MG: 4 GUM, CHEWING ORAL at 12:04

## 2019-10-26 RX ADMIN — TRAZODONE HYDROCHLORIDE 50 MG: 50 TABLET ORAL at 20:44

## 2019-10-26 RX ADMIN — FLUPHENAZINE HYDROCHLORIDE 4 MG: 1 TABLET, FILM COATED ORAL at 20:06

## 2019-10-26 RX ADMIN — LITHIUM CARBONATE 600 MG: 300 TABLET, EXTENDED RELEASE ORAL at 20:06

## 2019-10-26 RX ADMIN — DOCOSANOL: 100 CREAM TOPICAL at 20:47

## 2019-10-26 RX ADMIN — LORAZEPAM 1 MG: 1 TABLET ORAL at 13:21

## 2019-10-26 ASSESSMENT — ACTIVITIES OF DAILY LIVING (ADL)
DRESS: INDEPENDENT
ORAL_HYGIENE: INDEPENDENT
LAUNDRY: WITH SUPERVISION
HYGIENE/GROOMING: INDEPENDENT
HYGIENE/GROOMING: INDEPENDENT
LAUNDRY: WITH SUPERVISION
ORAL_HYGIENE: INDEPENDENT
DRESS: INDEPENDENT

## 2019-10-26 NOTE — PROGRESS NOTES
"Pt was receptive to an individual psychotherapy session after this therapist met with part of pt's treatment team (RN and Resident) to clarify team concerns and goals.  Pt shared that \"talking helps\" and expressed gratitude for an offer of verbal psychotherapy with dance/movement therapy perspectives including movement metaphors.  Pt was able to use her experience as a semi-professional  to verbally \"rally\" with this therapist in authentic ethical explorations regarding pt symptoms, as well as current social stressors.     Pt was honest about her reasons for disagreeing with the Rule 25 , but also minimized recent relapse and extent of drug use.  However, since this therapist is familiar with pt presentations on previous hospital admissions, this session marked a significant turning point in pt's ability to be honest with self and therapist, by demonstrating increased vulnerability in the therapeutic process.  Pt also expressed a willingness to trust the clinical knowledge in her tx team (both outpatient and inpatient.)  Pt admitted some hopelessness intermixed with a \"what-have-I-got-to-lose\" kind of attitude toward trying treatment team suggestions.  Pt appeared motivated to contract for safety while in the hospital as she experiences the SIO as \"uncomfortable having someone watching me all the time.\"        10/25/19 4338   Dance Movement Therapy   Type of Intervention 1:1 intervention   Response participates, initiates socially appropriate   Hours 1     "

## 2019-10-26 NOTE — PLAN OF CARE
"Ayana active and social  on unit-speaks freely in 1:1-states mood is pretty good today-when asked about SI with good mood, explains she feels she should be free to make her own decisions and that she feels suicide is best option for her-Ayana states she desperately doesn't want to return to life as it was before admission-states she is unhappy in every area of her life, work, school and family-states it seems to be too much to work on and she'd rather suicide and be done with it all-Ayana c/o, but at the same time said she is \"used to\", being broke-states she recently lost access to her inheritance from her grandparents and the $1500 a week cash her parents give her to assist with her expenses-states when she uses drugs her parents take the money away and she only recently had regained their trust to get money back-Ayana states her father recently sold his company \"[employer]\" which makes exercise equipment for gyms-Ayana reports spending a couple hundred dollars a day on drugs while receiving money from parents-Ayana spoke of strain living with mother-in-law while couple repairing home in [place], which is owned by wife-also spoke of strain of living with wife's three children stating, \"They're OK as people\" but she doesn't like being around children because they are noisy and they always need something-Again c/o not receiving enough emotional support from wife-Ayana continues to state her plan is to kill herself as soon as means available-when asked if she feels any stress related to suicide responded she truly feels this is her best option-states she can't stop using drugs because that is the only thing that makes her feel good-referred to drugs as a \"good numbing\" agent for her feelings-states she has considered returning home to live with parents in [place], [place], but feels too isolated there and reports conflict with parents regarding her mental illness and hospitalizations-Ayana complained several times of " "uncomfortable living situation-when staff commented financial resources that would be available without drug use Ayana commented again that she can't be without street drugs because that's the only thing she enjoys, then that she shouldn't have access to her inheritance because she has spent most of it and also that the money her parents were sending was being used to do renovations on wife's house which they are planning to move into post repairs-cited relationship with mother-in-law as major stressors stating she was mean, e.g. tells her she should be out working instead of lying in bed-Ayana upset mother-in-law \"doesn't understand my depression\"-continues to report auditory hallucinations telling her to kill self-remains on SIO     "

## 2019-10-26 NOTE — PROGRESS NOTES
Pt was out in the lounge the whole evening. Pt's affect was full range and mood was calm. On a 1-10 scale (1=none, 10=worst) Depression and anxiety were both a 10. Pt's wife came and visited this evening. Pt said she it was uneventful and she was happy it went that way. Pt reported still having active thoughts of wanting to kill herself. Pt stated they have been the same since this morning and have not gotten worse. Pt denies other mental health symptoms, SIB, and HI.        10/25/19 2139   Behavioral Health   Hallucinations denies / not responding to hallucinations   Thinking intact   Orientation person: oriented;place: oriented;date: oriented;time: oriented   Memory baseline memory   Insight poor   Judgement impaired   Eye Contact at floor;at examiner   Affect full range affect   Mood depressed;hopeless;mood is calm   Physical Appearance/Attire attire appropriate to age and situation   Hygiene well groomed   1. Wish to be Dead (Past Month) Yes   2. Non-Specific Active Suicidal Thoughts (Past Month) Yes  (Pt reported no changes from this morning. )   Psycho Education   Type of Intervention 1:1 intervention   Response participates with encouragement   Hours 0.5   Treatment Detail Check-In   Activities of Daily Living   Hygiene/Grooming independent   Oral Hygiene independent   Dress independent   Laundry with supervision   Room Organization independent

## 2019-10-27 PROCEDURE — 25000132 ZZH RX MED GY IP 250 OP 250 PS 637: Performed by: PSYCHIATRY & NEUROLOGY

## 2019-10-27 PROCEDURE — H2032 ACTIVITY THERAPY, PER 15 MIN: HCPCS

## 2019-10-27 PROCEDURE — 25000132 ZZH RX MED GY IP 250 OP 250 PS 637: Performed by: STUDENT IN AN ORGANIZED HEALTH CARE EDUCATION/TRAINING PROGRAM

## 2019-10-27 PROCEDURE — 12400001 ZZH R&B MH UMMC

## 2019-10-27 RX ADMIN — DOCOSANOL: 100 CREAM TOPICAL at 09:34

## 2019-10-27 RX ADMIN — NICOTINE POLACRILEX 8 MG: 4 GUM, CHEWING ORAL at 21:40

## 2019-10-27 RX ADMIN — LITHIUM CARBONATE 300 MG: 300 TABLET, EXTENDED RELEASE ORAL at 09:36

## 2019-10-27 RX ADMIN — FLUPHENAZINE HYDROCHLORIDE 1 MG: 1 TABLET, FILM COATED ORAL at 09:37

## 2019-10-27 RX ADMIN — TRIFLUOPERAZINE HYDROCHLORIDE 1 MG: 1 TABLET, FILM COATED ORAL at 15:52

## 2019-10-27 RX ADMIN — NICOTINE POLACRILEX 8 MG: 4 GUM, CHEWING ORAL at 12:59

## 2019-10-27 RX ADMIN — NICOTINE POLACRILEX 8 MG: 4 GUM, CHEWING ORAL at 19:58

## 2019-10-27 RX ADMIN — LORAZEPAM 1 MG: 1 TABLET ORAL at 15:52

## 2019-10-27 RX ADMIN — TRIFLUOPERAZINE HYDROCHLORIDE 1 MG: 1 TABLET, FILM COATED ORAL at 21:40

## 2019-10-27 RX ADMIN — LITHIUM CARBONATE 600 MG: 300 TABLET, EXTENDED RELEASE ORAL at 19:57

## 2019-10-27 RX ADMIN — NICOTINE POLACRILEX 8 MG: 4 GUM, CHEWING ORAL at 09:37

## 2019-10-27 RX ADMIN — GABAPENTIN 600 MG: 300 CAPSULE ORAL at 13:00

## 2019-10-27 RX ADMIN — NICOTINE POLACRILEX 8 MG: 4 GUM, CHEWING ORAL at 11:22

## 2019-10-27 RX ADMIN — PROCHLORPERAZINE MALEATE 5 MG: 5 TABLET ORAL at 20:47

## 2019-10-27 RX ADMIN — TRAZODONE HYDROCHLORIDE 50 MG: 50 TABLET ORAL at 19:57

## 2019-10-27 RX ADMIN — TRAZODONE HYDROCHLORIDE 50 MG: 50 TABLET ORAL at 22:27

## 2019-10-27 RX ADMIN — TRIFLUOPERAZINE HYDROCHLORIDE 1 MG: 1 TABLET, FILM COATED ORAL at 17:58

## 2019-10-27 RX ADMIN — FLUPHENAZINE HYDROCHLORIDE 4 MG: 1 TABLET, FILM COATED ORAL at 19:57

## 2019-10-27 RX ADMIN — GABAPENTIN 600 MG: 300 CAPSULE ORAL at 19:57

## 2019-10-27 RX ADMIN — GABAPENTIN 600 MG: 300 CAPSULE ORAL at 09:37

## 2019-10-27 RX ADMIN — TRAZODONE HYDROCHLORIDE 50 MG: 50 TABLET ORAL at 21:40

## 2019-10-27 RX ADMIN — NICOTINE 1 PATCH: 14 PATCH, EXTENDED RELEASE TRANSDERMAL at 09:38

## 2019-10-27 RX ADMIN — NICOTINE POLACRILEX 8 MG: 4 GUM, CHEWING ORAL at 16:17

## 2019-10-27 RX ADMIN — LORAZEPAM 1 MG: 1 TABLET ORAL at 12:59

## 2019-10-27 ASSESSMENT — ACTIVITIES OF DAILY LIVING (ADL)
HYGIENE/GROOMING: INDEPENDENT
LAUNDRY: WITH SUPERVISION
ORAL_HYGIENE: INDEPENDENT
LAUNDRY: WITH SUPERVISION
HYGIENE/GROOMING: INDEPENDENT
DRESS: INDEPENDENT
ORAL_HYGIENE: INDEPENDENT
DRESS: INDEPENDENT

## 2019-10-27 ASSESSMENT — MIFFLIN-ST. JEOR: SCORE: 1637.61

## 2019-10-27 NOTE — PROGRESS NOTES
"Pt presented with calm affect, organized thought process, and appropriate behavior. Pt was bright and social throughout shift, however, reported feeling \"Extremely depressed and anxious.\" She discussed SI at length with writer and was open to discuss coping skills r/t self-concept development. She endorsed thoughts to \"Jump off a bridge\" and intermittent urges to self-injure but was able to contract for safety. Pt expressed relational concerns with her wife are contributing largely to current distress, but was hopeful about idea of family therapy. Pt spoke positively about experience with [facility] team and current [facility] care plan. No psychotic symptoms were reported or observed.          10/27/19 1400   Behavioral Health   Hallucinations denies / not responding to hallucinations   Thinking intact   Orientation person: oriented;place: oriented;date: oriented;time: oriented   Memory baseline memory   Insight poor   Judgement impaired   Eye Contact at examiner   Affect full range affect   Mood mood is calm   Physical Appearance/Attire appears stated age;attire appropriate to age and situation   Hygiene neglected grooming - unclean body, hair, teeth  (Pt declined to shower this shift)   1. Wish to be Dead (Past Month) Yes   Wish to be Dead Description (Recent) Pt reports thoughts to juimp off bridge   2. Non-Specific Active Suicidal Thoughts (Past Month) Yes   Non-Specific Active Suicidal Thought Description (Recent) Thoughts of self harm   Psycho Education   Type of Intervention 1:1 intervention   Response participates, initiates socially appropriate   Hours 0.5   Treatment Detail Discussed safety on the unit   Activities of Daily Living   Hygiene/Grooming independent   Oral Hygiene independent   Dress independent   Laundry with supervision   Room Organization independent     "

## 2019-10-27 NOTE — PROGRESS NOTES
Pt was out in the milieu this evening interacting with peers. Pt had full range affect and his mood was calm. Pt said she doesn't know what she needs to do to get better. Pt also stated that she wants to get back to normal and feels so tired from trying all the time. Pt and writer spoke at lengths about her anxiety and depression. Pt said she will stay and try to get better. Pt denies SIB, HI and other mental health symptoms.       10/26/19 2131   Behavioral Health   Hallucinations denies / not responding to hallucinations   Thinking intact   Orientation person: oriented;place: oriented;date: oriented;time: oriented   Memory baseline memory   Insight poor   Judgement impaired   Eye Contact at examiner;at floor   Affect full range affect   Mood mood is calm   Physical Appearance/Attire attire appropriate to age and situation   Hygiene   (Pt did not shower this shift. )   1. Wish to be Dead (Past Month) Yes   2. Non-Specific Active Suicidal Thoughts (Past Month) Yes   3. Active Sucidal Ideation with any Methods (Not Plan) Without Intent to Act (Past Month) Yes   4. Active Suicidal Ideation with Some Intent to Act, Without Specific Plan (Past Month) Yes   5. Active Suicidal Ideation with Specific Plan and Intent (Past Month) Yes   Change in Protective Factors? No   Enviromental Risk Factors None   Psycho Education   Type of Intervention 1:1 intervention   Response participates, initiates socially appropriate   Hours 0.5   Treatment Detail Check-In   Activities of Daily Living   Hygiene/Grooming independent   Oral Hygiene independent   Dress independent   Laundry with supervision   Room Organization independent

## 2019-10-27 NOTE — PROGRESS NOTES
"Psychiatry Cross Cover Note    S: Notified by staff that patient signed a 12 hour intent to leave the hospital.  I spoke with patient around 8:30pm.  She stated, \"I am bored and feel trapped, and I want to leave\".  I explained that myself and her primary team had concerns about her ability to remain safe if she left the hospital given recent endorsement of SI with plan.  Ayana stated, \"Okay, I understand\".  She asked if she would be put on a hold.  I encouraged her to sign in voluntarily.  Ayana said, \"I will take the hold\".     O: /79 (BP Location: Left arm)   Pulse 84   Temp 98  F (36.7  C) (Oral)   Resp 14   Ht 1.651 m (5' 5\")   Wt 89.7 kg (197 lb 11.2 oz)   LMP  (LMP Unknown)   SpO2 98%   BMI 32.90 kg/m      A/P:  - 72 hour hold signed at 8:35pm      Jeniffer Wade DO, DUC, MS  On-call PGY-2 Psychiatry Resident      "

## 2019-10-28 ENCOUNTER — VIRTUAL VISIT (OUTPATIENT)
Dept: PSYCHIATRY | Facility: CLINIC | Age: 29
End: 2019-10-28
Payer: COMMERCIAL

## 2019-10-28 DIAGNOSIS — F25.0 SCHIZOAFFECTIVE DISORDER, BIPOLAR TYPE (H): Primary | ICD-10-CM

## 2019-10-28 LAB — LITHIUM SERPL-SCNC: 0.67 MMOL/L (ref 0.6–1.2)

## 2019-10-28 PROCEDURE — 99232 SBSQ HOSP IP/OBS MODERATE 35: CPT | Mod: GC | Performed by: PSYCHIATRY & NEUROLOGY

## 2019-10-28 PROCEDURE — 36415 COLL VENOUS BLD VENIPUNCTURE: CPT | Performed by: STUDENT IN AN ORGANIZED HEALTH CARE EDUCATION/TRAINING PROGRAM

## 2019-10-28 PROCEDURE — 25000132 ZZH RX MED GY IP 250 OP 250 PS 637: Performed by: PSYCHIATRY & NEUROLOGY

## 2019-10-28 PROCEDURE — H2032 ACTIVITY THERAPY, PER 15 MIN: HCPCS

## 2019-10-28 PROCEDURE — 25000132 ZZH RX MED GY IP 250 OP 250 PS 637: Performed by: STUDENT IN AN ORGANIZED HEALTH CARE EDUCATION/TRAINING PROGRAM

## 2019-10-28 PROCEDURE — 80178 ASSAY OF LITHIUM: CPT | Performed by: STUDENT IN AN ORGANIZED HEALTH CARE EDUCATION/TRAINING PROGRAM

## 2019-10-28 PROCEDURE — 12400001 ZZH R&B MH UMMC

## 2019-10-28 PROCEDURE — G0177 OPPS/PHP; TRAIN & EDUC SERV: HCPCS

## 2019-10-28 RX ORDER — LITHIUM CARBONATE 450 MG
900 TABLET, EXTENDED RELEASE ORAL EVERY EVENING
Status: DISCONTINUED | OUTPATIENT
Start: 2019-10-28 | End: 2019-12-17 | Stop reason: HOSPADM

## 2019-10-28 RX ADMIN — TRAZODONE HYDROCHLORIDE 50 MG: 50 TABLET ORAL at 21:31

## 2019-10-28 RX ADMIN — DOCOSANOL: 100 CREAM TOPICAL at 20:52

## 2019-10-28 RX ADMIN — DOCOSANOL: 100 CREAM TOPICAL at 10:38

## 2019-10-28 RX ADMIN — FLUPHENAZINE HYDROCHLORIDE 4 MG: 1 TABLET, FILM COATED ORAL at 20:52

## 2019-10-28 RX ADMIN — GABAPENTIN 600 MG: 300 CAPSULE ORAL at 09:22

## 2019-10-28 RX ADMIN — NICOTINE POLACRILEX 8 MG: 4 GUM, CHEWING ORAL at 21:32

## 2019-10-28 RX ADMIN — GABAPENTIN 600 MG: 300 CAPSULE ORAL at 20:52

## 2019-10-28 RX ADMIN — TRIFLUOPERAZINE HYDROCHLORIDE 1 MG: 1 TABLET, FILM COATED ORAL at 18:40

## 2019-10-28 RX ADMIN — LORAZEPAM 1 MG: 1 TABLET ORAL at 18:40

## 2019-10-28 RX ADMIN — TRAZODONE HYDROCHLORIDE 50 MG: 50 TABLET ORAL at 22:18

## 2019-10-28 RX ADMIN — LORAZEPAM 1 MG: 1 TABLET ORAL at 11:04

## 2019-10-28 RX ADMIN — LITHIUM CARBONATE 900 MG: 450 TABLET, EXTENDED RELEASE ORAL at 20:52

## 2019-10-28 RX ADMIN — NICOTINE POLACRILEX 4 MG: 4 GUM, CHEWING ORAL at 15:24

## 2019-10-28 RX ADMIN — NICOTINE POLACRILEX 8 MG: 4 GUM, CHEWING ORAL at 09:22

## 2019-10-28 RX ADMIN — FLUPHENAZINE HYDROCHLORIDE 1 MG: 1 TABLET, FILM COATED ORAL at 09:23

## 2019-10-28 RX ADMIN — TRAZODONE HYDROCHLORIDE 50 MG: 50 TABLET ORAL at 20:52

## 2019-10-28 RX ADMIN — NICOTINE 1 PATCH: 14 PATCH, EXTENDED RELEASE TRANSDERMAL at 09:23

## 2019-10-28 RX ADMIN — GABAPENTIN 600 MG: 300 CAPSULE ORAL at 13:18

## 2019-10-28 RX ADMIN — TRIFLUOPERAZINE HYDROCHLORIDE 1 MG: 1 TABLET, FILM COATED ORAL at 12:39

## 2019-10-28 RX ADMIN — NICOTINE POLACRILEX 8 MG: 4 GUM, CHEWING ORAL at 19:30

## 2019-10-28 RX ADMIN — LITHIUM CARBONATE 300 MG: 300 TABLET, EXTENDED RELEASE ORAL at 09:22

## 2019-10-28 ASSESSMENT — ACTIVITIES OF DAILY LIVING (ADL)
HYGIENE/GROOMING: INDEPENDENT
DRESS: INDEPENDENT
ORAL_HYGIENE: INDEPENDENT

## 2019-10-28 NOTE — PLAN OF CARE
Pt continuous on SIO 1:1 for safety;  up and visible on the unit; spent majority of the shift out of their room; ate breakfast and lunch; appetite is good; social with peers and staff; attended expressive goal setting group briefly; endorsed auditory hallucination command in nature telling them to commite  suicidal  Via overdose; reported having active suicidal ideation; rated depression 8/10 and anxiety 10/10, 10 being extreme; reported a concern about roommate disruptive behavior and care team addressed pt concern; pt attended art group; pt stated that I am not hopeful about the future. Writer encouraged pt to report concern and needs to staff so as to get the help they need. Will continue to monitor.

## 2019-10-28 NOTE — PROGRESS NOTES
"Pt was out in the milieu this evening interacting with pts and staff members. Pt had full range affect and her mood was calm. Pt said she is feeling the same as yesterday and her SI is the same. Pt said there is a new voice in her head saying \"God says to kill yourself\". Pt and writer spoke at length about some different coping skills and mindfullness activities. Pt enjoyed coloring and watching tv. Pt reported depression and anxiety were both 10/10 (highest they could be). Pt denies other mental health symptoms, SIB, and HI.        10/27/19 2040   Behavioral Health   Hallucinations auditory   Thinking intact   Orientation person: oriented;place: oriented;date: oriented;time: oriented   Memory baseline memory   Insight poor   Judgement impaired   Eye Contact at examiner   Affect full range affect   Mood mood is calm   Physical Appearance/Attire attire appropriate to age and situation   Hygiene neglected grooming - unclean body, hair, teeth   1. Wish to be Dead (Past Month) Yes   2. Non-Specific Active Suicidal Thoughts (Past Month) Yes   3. Active Sucidal Ideation with any Methods (Not Plan) Without Intent to Act (Past Month) Yes   4. Active Suicidal Ideation with Some Intent to Act, Without Specific Plan (Past Month) Yes   5. Active Suicidal Ideation with Specific Plan and Intent (Past Month) Yes   Enviromental Risk Factors None   Psycho Education   Type of Intervention 1:1 intervention   Response participates, initiates socially appropriate   Hours 0.5   Treatment Detail Check-In   Activities of Daily Living   Hygiene/Grooming independent   Oral Hygiene independent   Dress independent   Laundry with supervision   Room Organization independent     "

## 2019-10-28 NOTE — PROGRESS NOTES
"Ayana  attended 1 of 2 OT groups today. Quiet, contained, kept to herself. Worked on a \"letter to self;\" worked on a drawing of the world. Agreeable & calm.        10/28/19 1200   Occupational Therapy   Type of Intervention structured groups   Response Initiates, socially acceptable   Hours 1     "
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
## 2019-10-29 PROCEDURE — 93005 ELECTROCARDIOGRAM TRACING: CPT

## 2019-10-29 PROCEDURE — 99232 SBSQ HOSP IP/OBS MODERATE 35: CPT | Mod: GC | Performed by: PSYCHIATRY & NEUROLOGY

## 2019-10-29 PROCEDURE — G0177 OPPS/PHP; TRAIN & EDUC SERV: HCPCS

## 2019-10-29 PROCEDURE — 93010 ELECTROCARDIOGRAM REPORT: CPT | Mod: 76 | Performed by: INTERNAL MEDICINE

## 2019-10-29 PROCEDURE — 25000132 ZZH RX MED GY IP 250 OP 250 PS 637: Performed by: PSYCHIATRY & NEUROLOGY

## 2019-10-29 PROCEDURE — 12400001 ZZH R&B MH UMMC

## 2019-10-29 PROCEDURE — 90832 PSYTX W PT 30 MINUTES: CPT

## 2019-10-29 PROCEDURE — 25000132 ZZH RX MED GY IP 250 OP 250 PS 637: Performed by: STUDENT IN AN ORGANIZED HEALTH CARE EDUCATION/TRAINING PROGRAM

## 2019-10-29 RX ORDER — QUETIAPINE FUMARATE 50 MG/1
50 TABLET, FILM COATED ORAL
Status: DISCONTINUED | OUTPATIENT
Start: 2019-10-29 | End: 2019-10-30

## 2019-10-29 RX ADMIN — FLUPHENAZINE HYDROCHLORIDE 1 MG: 1 TABLET, FILM COATED ORAL at 08:36

## 2019-10-29 RX ADMIN — NICOTINE POLACRILEX 8 MG: 4 GUM, CHEWING ORAL at 10:04

## 2019-10-29 RX ADMIN — GABAPENTIN 600 MG: 300 CAPSULE ORAL at 20:35

## 2019-10-29 RX ADMIN — LITHIUM CARBONATE 300 MG: 300 TABLET, EXTENDED RELEASE ORAL at 08:36

## 2019-10-29 RX ADMIN — DOCOSANOL: 100 CREAM TOPICAL at 19:09

## 2019-10-29 RX ADMIN — GABAPENTIN 600 MG: 300 CAPSULE ORAL at 08:36

## 2019-10-29 RX ADMIN — NICOTINE 1 PATCH: 14 PATCH, EXTENDED RELEASE TRANSDERMAL at 08:36

## 2019-10-29 RX ADMIN — NICOTINE POLACRILEX 8 MG: 4 GUM, CHEWING ORAL at 08:47

## 2019-10-29 RX ADMIN — NICOTINE POLACRILEX 8 MG: 4 GUM, CHEWING ORAL at 19:09

## 2019-10-29 RX ADMIN — LORAZEPAM 1 MG: 1 TABLET ORAL at 19:09

## 2019-10-29 RX ADMIN — TRIFLUOPERAZINE HYDROCHLORIDE 1 MG: 1 TABLET, FILM COATED ORAL at 19:09

## 2019-10-29 RX ADMIN — QUETIAPINE FUMARATE 50 MG: 50 TABLET ORAL at 20:34

## 2019-10-29 RX ADMIN — NICOTINE POLACRILEX 8 MG: 4 GUM, CHEWING ORAL at 15:04

## 2019-10-29 RX ADMIN — GABAPENTIN 600 MG: 300 CAPSULE ORAL at 15:03

## 2019-10-29 RX ADMIN — LITHIUM CARBONATE 900 MG: 450 TABLET, EXTENDED RELEASE ORAL at 20:34

## 2019-10-29 RX ADMIN — PROCHLORPERAZINE MALEATE 5 MG: 5 TABLET ORAL at 11:29

## 2019-10-29 RX ADMIN — LORAZEPAM 1 MG: 1 TABLET ORAL at 10:04

## 2019-10-29 RX ADMIN — FLUPHENAZINE HYDROCHLORIDE 4 MG: 1 TABLET, FILM COATED ORAL at 20:34

## 2019-10-29 ASSESSMENT — ACTIVITIES OF DAILY LIVING (ADL)
LAUNDRY: WITH SUPERVISION
ORAL_HYGIENE: INDEPENDENT
LAUNDRY: WITH SUPERVISION
ORAL_HYGIENE: INDEPENDENT
HYGIENE/GROOMING: INDEPENDENT
DRESS: INDEPENDENT;STREET CLOTHES
HYGIENE/GROOMING: INDEPENDENT
DRESS: INDEPENDENT;STREET CLOTHES
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
## 2019-10-29 NOTE — PROGRESS NOTES
NAVIGATE SEE Outgoing Telephone Call  For Supported Employment & Education    NAVIGATE Enrollee: Ayana Prasad (1990)     MRN: [id number]  Date of Call: 10/29/2019  Contacted: [employer] in [place]    Discussed:   Writer called and left message for . Introduced self and informed them that I am working with an employee who is unable to schedule orientation at this time due to hospitalization. See telephone encounter dated 10/28 with more details when manager returned call.     Linh Hamilton

## 2019-10-29 NOTE — PROGRESS NOTES
Ayana  attended 2 of 3 OT groups today. Independently selected an art project to work on during OT clinic this morning & worked with focus on it for the majority of the session. Was bright & chatty this afternoon during a structured seasonal craft/reflection project. Appears to enjoy groups & benefit from therapeutic milieu.        10/29/19 1500   Occupational Therapy   Type of Intervention structured groups   Response Participates   Hours 2

## 2019-10-29 NOTE — PROGRESS NOTES
Patient slept 6.5 hours overnight. Patient awakened late in shift  From loud yelling from another patient's room. Patient continues on strict SIO due to extreme suicidal ideation. Will continue to monitor and assess.   I performed the initial face to face bedside interview with this patient regarding history of present illness, review of symptoms and past medical, social and family history.  I completed an independent physical examination.  I was the initial provider who evaluated this patient.  The history, review of symptoms and examination was documented by the scribe in my presence and I attest to the accuracy of the documentation.  I have signed out the follow up of any pending tests (i.e. labs, radiological studies) to the PA.  I have discussed the patient’s plan of care and disposition with the PA.

## 2019-10-29 NOTE — PROGRESS NOTES
NAVIGATE SEE Outgoing Telephone Call  For Supported Employment & Education    NAVIGATE Enrollee: Ayana Prasad (1990)     MRN: [id number]  Date of Call: 10/29/2019  Contacted: Celia Evans (employer)    Discussed:   Per Ayana's request, this writer called and spoke with Cristina, manager of HR at [facility]. Writer informed her that I am  working at [employer] and that Ayana is currently in the hospital. Writer requested a push back start date and Cristina was agreeable. Cristina said if Ayana is unable to start by mid-November that we should have another phone call or conversation.     Linh Hamilton

## 2019-10-29 NOTE — PROGRESS NOTES
10/28/19 2100   Art Therapy   Type of Intervention structured groups   Response Participated with encouragement   Hours 1   Treatment Detail   (Art Therapy- Grounding skills directive)   Goal- cope, regulate and express through Art Therapy directive    Outcome- pt attended the Art Therapy group.She did a thoughtful project that had a light hand element in yellows listing her grounding skills. The background was very dark black. She seemed to benefit from the sensory work with the soft tempera black paint stick and also she asked to do scratch art, also , sensory, sublimation , possibly for the  SIB urge. She seems to enjoy the 1:1 staff attention . She was pleasant and cooperative.

## 2019-10-29 NOTE — PROGRESS NOTES
Re: MICD Committment    Writer initiated MICD commitment through [facility]. Documentation and court forms faxed to [facility]. Copy placed in patient chart and sent to scanning.    Joana Marroquin, BORISC, LADC

## 2019-10-29 NOTE — PROGRESS NOTES
CLINICAL NUTRITION SERVICES - REASSESSMENT NOTE     Nutrition Prescription    RECOMMENDATIONS FOR MDs/PROVIDERS TO ORDER:  None at this time    Malnutrition Status:    Patient does not meet two of the above criteria necessary for diagnosing malnutrition    Recommendations already ordered by Registered Dietitian (RD):  - Order Propel Zero with TID meals per pt request   - Continue Boost regimen as ordered    Future/Additional Recommendations:  - Continue to monitor PO intake and wt trends  - Adjust supplement/snack regimen as desired by the pt     EVALUATION OF THE PROGRESS TOWARD GOALS   Diet: Regular, BID Boost with meals (Strawberry with breakfast, vanilla with lunch)  Intake: Pt reports some improvement in her appetite and she is now eating 50% of TID meals along with 1-2 Boost/day. Pt requests Propel with TID meals.        NEW FINDINGS   Pt has gained 6# since admission on 10/22 and her wt has now been stable over the last month.   Wt Readings from Last 1 Encounters:   10/27/19 91.2 kg (201 lb)       MALNUTRITION  % Intake: decreased intake does not meet criteria, likely meeting 75% of needs with food and Boost consumption  % Weight Loss: None noted  Subcutaneous Fat Loss: None observed  Muscle Loss: None observed  Fluid Accumulation/Edema: None noted  Malnutrition Diagnosis: Patient does not meet two of the above criteria necessary for diagnosing malnutrition    Previous Goals   Patient to consume % of nutritionally adequate meal trays TID, or the equivalent with supplements/snacks.  Evaluation: Met    Previous Nutrition Diagnosis  Inadequate protein-energy intake related to poor appetite as evidenced by the pt consuming nothing PO 7 days prior to admission except water.    Evaluation: Improving    CURRENT NUTRITION DIAGNOSIS  Predicted inadequate protein-energy intake related to h/o of poor appetite and eating nothing 7 days prior to admission.        INTERVENTIONS  Implementation  - Medical food  supplement therapy - continue as ordered  - Modify composition of meals/snacks - order TID Propel Zero with meals    Goals  Patient to consume % of nutritionally adequate meal trays TID, or the equivalent with supplements/snacks.    Monitoring/Evaluation  Progress toward goals will be monitored and evaluated per protocol.      Lauryn Simmons RD, LD  Pager: [phone]

## 2019-10-29 NOTE — PLAN OF CARE
BEHAVIORAL TEAM DISCUSSION    Participants:   Dr. Morin, Attending Psychiatrist  Joana Marroquin, LPCC, LADC, CTC  Sunni Clark, Resident  Talia Armijo, SRINIVAS  Progress: No change  Anticipated length of stay: Undetermined  Continued Stay Criteria/Rationale: MI commitment initiated  Medical/Physical: No acute issues  Precautions:   Behavioral Orders   Procedures    Code 1 - Restrict to Unit    Elopement precautions    Routine Programming     As clinically indicated    Self Injury Precaution    Status 15     Every 15 minutes.    Status Individual Observation     Order Specific Question:   CONTINUOUS 24 hours / day     Answer:   5 feet     Order Specific Question:   Indications for SIO     Answer:   Self-injury risk     Order Specific Question:   Indications for SIO     Answer:   Suicide risk     Order Specific Question:   Indications for SIO     Answer:   Medical equipment / ligature risk    Suicide precautions     Patients on Suicide Precautions should have a Combination Diet ordered that includes a Diet selection(s) AND a Behavioral Tray selection for Safe Tray - with utensils, or Safe Tray - NO utensils     Plan: Writer to initiate MI commitment through [facility]  Rationale for change in precautions or plan: No change at present-will continue to monitor and adjust as needed

## 2019-10-29 NOTE — PROGRESS NOTES
"Pt presented with calm affect, organized thought process, and appropriate behavior. She reported feeling \"Decent,\" however, continues to endorse frequent suicidality and urges to self-harm. She was able to contract for safety but noted she would follow-through with these actions if discharged. Pt was reluctant to transition to voluntary status and accepting of decision to proceed with commitment process. She expressed concern about restrictions of commitment but reiterated it was helpful in the past and preferred by her spouse. Put attended groups throughout shift and socialized readily with staff and peers. She continues to focus on relational concerns with her partner and reported feeling \"nervous\" to see her wife this Thursday. Pt stated her auditory hallucinations were \"More mellow,\" no other psychotic symptoms were reported or observed.          10/29/19 1400   Behavioral Health   Hallucinations auditory   Thinking distractable   Orientation person: oriented;place: oriented;date: oriented;time: oriented   Memory baseline memory   Insight poor   Judgement impaired   Eye Contact at examiner   Affect full range affect   Mood mood is calm   Physical Appearance/Attire appears stated age;attire appropriate to age and situation   Hygiene neglected grooming - unclean body, hair, teeth  (Pt declined to shower this shift)   1. Wish to be Dead (Recent) Yes   Wish to be Dead Description (Recent)   (Continues to state: \"I don't want to be here.\" )   2. Non-Specific Active Suicidal Thoughts (Recent) Yes   Non-Specific Active Suicidal Thought Description (Recent)   (Pt reports continued thoughts of self-harm and suicide)   Psycho Education   Type of Intervention 1:1 intervention   Response participates, initiates socially appropriate   Hours 0.5   Treatment Detail Check-in   Activities of Daily Living   Hygiene/Grooming independent   Oral Hygiene independent   Dress independent;street clothes   Laundry with supervision   Room " Organization independent

## 2019-10-29 NOTE — PROGRESS NOTES
"Pt spent much of the evening reserved to their room laying on their bed resting and listening to music. Pt came out briefly to eat meals and watch TV. During the middle of the evening pt was told that they would be getting a roommate pt stated \"are you fucking kidding me\". After saying this they did not say much more and rolled over and continued to rest. During check-in with writer pt stated that they are hearing voices that tell them to kill themselves. Pt rated their anxiety a 10/10 and their depression an 8/10. They attributed these high ratings to getting a roommate stating \"I just moved out of there today, and just wanted to get one good night of sleep\". When asked if they were feeling suicidal pt stated \"yes\" and went on to say that if they were able they would overdose, hang themselves, or find some way to kill themselves.      10/28/19 2136   Behavioral Health   Hallucinations auditory   Thinking distractable   Orientation person: oriented;place: oriented   Memory baseline memory   Insight poor   Judgement impaired   Eye Contact at examiner;staring   Affect blunted, flat;sad   Mood mood is calm;depressed;anxious   Physical Appearance/Attire attire appropriate to age and situation   Hygiene   (adequate)   Suicidality thoughts and plan   1. Wish to be Dead (Past Month) Yes   2. Non-Specific Active Suicidal Thoughts (Past Month) Yes   3. Active Sucidal Ideation with any Methods (Not Plan) Without Intent to Act (Past Month) Yes   Self Injury thoughts only   Elopement   (None stated or observed)   Activity withdrawn   Speech clear;coherent   Medication Sensitivity no stated side effects;no observed side effects   Psychomotor / Gait steady;balanced   Psycho Education   Type of Intervention 1:1 intervention   Response participates with encouragement   Hours 0.5   Treatment Detail Check-in   Activities of Daily Living   Hygiene/Grooming independent   Oral Hygiene independent   Dress independent   Room Organization " independent

## 2019-10-30 ENCOUNTER — TRANSFERRED RECORDS (OUTPATIENT)
Dept: HEALTH INFORMATION MANAGEMENT | Facility: CLINIC | Age: 29
End: 2019-10-30

## 2019-10-30 LAB — INTERPRETATION ECG - MUSE: NORMAL

## 2019-10-30 PROCEDURE — 25000128 H RX IP 250 OP 636: Performed by: STUDENT IN AN ORGANIZED HEALTH CARE EDUCATION/TRAINING PROGRAM

## 2019-10-30 PROCEDURE — H2032 ACTIVITY THERAPY, PER 15 MIN: HCPCS

## 2019-10-30 PROCEDURE — 99232 SBSQ HOSP IP/OBS MODERATE 35: CPT | Mod: GC | Performed by: PSYCHIATRY & NEUROLOGY

## 2019-10-30 PROCEDURE — 12400001 ZZH R&B MH UMMC

## 2019-10-30 PROCEDURE — 25000132 ZZH RX MED GY IP 250 OP 250 PS 637: Performed by: STUDENT IN AN ORGANIZED HEALTH CARE EDUCATION/TRAINING PROGRAM

## 2019-10-30 PROCEDURE — 25000132 ZZH RX MED GY IP 250 OP 250 PS 637: Performed by: PSYCHIATRY & NEUROLOGY

## 2019-10-30 PROCEDURE — 90837 PSYTX W PT 60 MINUTES: CPT

## 2019-10-30 RX ORDER — FLUPHENAZINE DECANOATE 25 MG/ML
6.25 INJECTION, SOLUTION INTRAMUSCULAR; SUBCUTANEOUS
Status: DISCONTINUED | OUTPATIENT
Start: 2019-10-30 | End: 2019-12-17 | Stop reason: HOSPADM

## 2019-10-30 RX ORDER — QUETIAPINE FUMARATE 50 MG/1
50 TABLET, FILM COATED ORAL AT BEDTIME
Status: DISCONTINUED | OUTPATIENT
Start: 2019-10-30 | End: 2019-11-04

## 2019-10-30 RX ADMIN — NICOTINE POLACRILEX 8 MG: 4 GUM, CHEWING ORAL at 18:00

## 2019-10-30 RX ADMIN — FLUPHENAZINE HYDROCHLORIDE 1 MG: 1 TABLET, FILM COATED ORAL at 10:38

## 2019-10-30 RX ADMIN — LORAZEPAM 1 MG: 1 TABLET ORAL at 18:01

## 2019-10-30 RX ADMIN — NICOTINE POLACRILEX 8 MG: 4 GUM, CHEWING ORAL at 11:03

## 2019-10-30 RX ADMIN — GABAPENTIN 600 MG: 300 CAPSULE ORAL at 10:38

## 2019-10-30 RX ADMIN — NICOTINE 1 PATCH: 14 PATCH, EXTENDED RELEASE TRANSDERMAL at 10:38

## 2019-10-30 RX ADMIN — FLUPHENAZINE DECANOATE 6.25 MG: 25 INJECTION, SOLUTION INTRAMUSCULAR; SUBCUTANEOUS at 14:58

## 2019-10-30 RX ADMIN — QUETIAPINE FUMARATE 50 MG: 50 TABLET ORAL at 20:05

## 2019-10-30 RX ADMIN — NICOTINE POLACRILEX 8 MG: 4 GUM, CHEWING ORAL at 13:02

## 2019-10-30 RX ADMIN — GABAPENTIN 600 MG: 300 CAPSULE ORAL at 20:05

## 2019-10-30 RX ADMIN — LITHIUM CARBONATE 900 MG: 450 TABLET, EXTENDED RELEASE ORAL at 20:06

## 2019-10-30 RX ADMIN — LORAZEPAM 1 MG: 1 TABLET ORAL at 13:02

## 2019-10-30 RX ADMIN — GABAPENTIN 600 MG: 300 CAPSULE ORAL at 13:02

## 2019-10-30 RX ADMIN — FLUPHENAZINE HYDROCHLORIDE 4 MG: 1 TABLET, FILM COATED ORAL at 20:05

## 2019-10-30 RX ADMIN — LITHIUM CARBONATE 300 MG: 300 TABLET, EXTENDED RELEASE ORAL at 10:38

## 2019-10-30 RX ADMIN — NICOTINE POLACRILEX 8 MG: 4 GUM, CHEWING ORAL at 20:04

## 2019-10-30 RX ADMIN — NICOTINE POLACRILEX 8 MG: 4 GUM, CHEWING ORAL at 16:46

## 2019-10-30 ASSESSMENT — ACTIVITIES OF DAILY LIVING (ADL)
HYGIENE/GROOMING: INDEPENDENT
LAUNDRY: WITH SUPERVISION
DRESS: INDEPENDENT
ORAL_HYGIENE: INDEPENDENT
ORAL_HYGIENE: INDEPENDENT
DRESS: INDEPENDENT
HYGIENE/GROOMING: INDEPENDENT

## 2019-10-30 NOTE — PROGRESS NOTES
10/30/19 1400   Behavioral Health   Hallucinations auditory   Thinking poor concentration;distractable   Orientation person: oriented;place: oriented;date: oriented;time: oriented   Memory baseline memory   Insight poor   Judgement impaired   Eye Contact at examiner   Affect full range affect   Mood mood is calm   Physical Appearance/Attire attire appropriate to age and situation   Hygiene well groomed   Suicidality thoughts and plan   1. Wish to be Dead (Recent) Yes   2. Non-Specific Active Suicidal Thoughts (Recent) Yes   Change in Protective Factors? No   Enviromental Risk Factors None   Self Injury thoughts only   Activity other (see comment)  (Active in groups and milieu)   Speech clear;coherent   Medication Sensitivity no stated side effects;no observed side effects   Psychomotor / Gait balanced;steady   Activities of Daily Living   Hygiene/Grooming independent   Oral Hygiene independent   Dress independent   Laundry with supervision   Room Organization independent     Pt. Was active in groups and social with her 1:1 staff throughout the shift. She would attend groups, and when not in groups spend time either in the lounge or sitting in a chair in the hallway. She does endorse SI thoughts with a plan if she was able to accomplish it. She otherwise had a typical shift in which there were no concerns.

## 2019-10-30 NOTE — PROGRESS NOTES
Pt had a pleasant shift.  Pt socialized with staff and other pts.  Pt enjoyed playing games, watching TV, and coloring.  Pt ate dinner and snacks.         10/29/19 220   Behavioral Health   Hallucinations auditory   Thinking distractable   Orientation person: oriented;place: oriented;date: oriented;time: oriented   Memory baseline memory   Insight poor   Judgement impaired   Eye Contact at examiner   Affect full range affect   Mood mood is calm   Physical Appearance/Attire attire appropriate to age and situation   Hygiene neglected grooming - unclean body, hair, teeth   1. Wish to be Dead (Recent) Yes   2. Non-Specific Active Suicidal Thoughts (Recent) Yes   Speech clear;coherent   Psychomotor / Gait balanced;steady   Psycho Education   Type of Intervention 1:1 intervention   Response participates, initiates socially appropriate   Hours 0.5   Treatment Detail Check-in   Activities of Daily Living   Hygiene/Grooming independent   Oral Hygiene independent   Dress independent;street clothes   Laundry with supervision   Room Organization independent
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
## 2019-10-30 NOTE — PROGRESS NOTES
Re: Civil Committment    [facility] is supporting MI commitment but not CD. Court hold will be called to unit by [facility] court staff when order is complete per [facility] process. RN to notify team physician when hold is called to unit.    Joana Marroquin, LPCC, LADC
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
## 2019-10-30 NOTE — PLAN OF CARE
The patient specific goals include:   Patient will participate in unit programming  Patient will identify triggers and positive coping skills  Patient will take medications as prescribed by physician both for mental health and medical  Patient coached to work on coping packet    The patient identified the following reasons for hospitalization:  Increased depression  SI    The patient identified the following goals for discharge:  Medication management

## 2019-10-31 PROCEDURE — 25000132 ZZH RX MED GY IP 250 OP 250 PS 637: Performed by: STUDENT IN AN ORGANIZED HEALTH CARE EDUCATION/TRAINING PROGRAM

## 2019-10-31 PROCEDURE — 25000132 ZZH RX MED GY IP 250 OP 250 PS 637: Performed by: PSYCHIATRY & NEUROLOGY

## 2019-10-31 PROCEDURE — 12400001 ZZH R&B MH UMMC

## 2019-10-31 PROCEDURE — 99232 SBSQ HOSP IP/OBS MODERATE 35: CPT | Mod: GC | Performed by: PSYCHIATRY & NEUROLOGY

## 2019-10-31 PROCEDURE — 25000128 H RX IP 250 OP 636: Performed by: STUDENT IN AN ORGANIZED HEALTH CARE EDUCATION/TRAINING PROGRAM

## 2019-10-31 RX ORDER — POLYETHYLENE GLYCOL 3350 17 G/17G
17 POWDER, FOR SOLUTION ORAL 2 TIMES DAILY PRN
Status: DISCONTINUED | OUTPATIENT
Start: 2019-10-31 | End: 2019-12-17 | Stop reason: HOSPADM

## 2019-10-31 RX ORDER — ONDANSETRON 4 MG/1
4 TABLET, FILM COATED ORAL EVERY 6 HOURS PRN
Status: DISCONTINUED | OUTPATIENT
Start: 2019-10-31 | End: 2019-12-17 | Stop reason: HOSPADM

## 2019-10-31 RX ORDER — PROPRANOLOL HYDROCHLORIDE 10 MG/1
10 TABLET ORAL 3 TIMES DAILY PRN
Status: DISCONTINUED | OUTPATIENT
Start: 2019-10-31 | End: 2019-11-01

## 2019-10-31 RX ORDER — BENZTROPINE MESYLATE 1 MG/1
1 TABLET ORAL 3 TIMES DAILY PRN
Status: DISCONTINUED | OUTPATIENT
Start: 2019-10-31 | End: 2019-12-17 | Stop reason: HOSPADM

## 2019-10-31 RX ORDER — QUETIAPINE FUMARATE 25 MG/1
25 TABLET, FILM COATED ORAL 2 TIMES DAILY PRN
Status: DISCONTINUED | OUTPATIENT
Start: 2019-10-31 | End: 2019-11-01

## 2019-10-31 RX ADMIN — NICOTINE 1 PATCH: 14 PATCH, EXTENDED RELEASE TRANSDERMAL at 09:38

## 2019-10-31 RX ADMIN — LORAZEPAM 1 MG: 1 TABLET ORAL at 16:32

## 2019-10-31 RX ADMIN — NICOTINE POLACRILEX 8 MG: 4 GUM, CHEWING ORAL at 18:25

## 2019-10-31 RX ADMIN — NICOTINE POLACRILEX 8 MG: 4 GUM, CHEWING ORAL at 12:11

## 2019-10-31 RX ADMIN — GABAPENTIN 600 MG: 300 CAPSULE ORAL at 13:18

## 2019-10-31 RX ADMIN — NICOTINE POLACRILEX 8 MG: 4 GUM, CHEWING ORAL at 13:19

## 2019-10-31 RX ADMIN — NICOTINE POLACRILEX 8 MG: 4 GUM, CHEWING ORAL at 19:56

## 2019-10-31 RX ADMIN — NICOTINE POLACRILEX 8 MG: 4 GUM, CHEWING ORAL at 09:37

## 2019-10-31 RX ADMIN — LITHIUM CARBONATE 300 MG: 300 TABLET, EXTENDED RELEASE ORAL at 09:38

## 2019-10-31 RX ADMIN — FLUPHENAZINE HYDROCHLORIDE 4 MG: 1 TABLET, FILM COATED ORAL at 22:19

## 2019-10-31 RX ADMIN — NICOTINE POLACRILEX 8 MG: 4 GUM, CHEWING ORAL at 15:09

## 2019-10-31 RX ADMIN — TRIFLUOPERAZINE HYDROCHLORIDE 1 MG: 1 TABLET, FILM COATED ORAL at 15:59

## 2019-10-31 RX ADMIN — NICOTINE POLACRILEX 8 MG: 4 GUM, CHEWING ORAL at 10:52

## 2019-10-31 RX ADMIN — BENZTROPINE MESYLATE 1 MG: 1 TABLET ORAL at 16:56

## 2019-10-31 RX ADMIN — LITHIUM CARBONATE 900 MG: 450 TABLET, EXTENDED RELEASE ORAL at 21:32

## 2019-10-31 RX ADMIN — ONDANSETRON HYDROCHLORIDE 4 MG: 4 TABLET, FILM COATED ORAL at 18:59

## 2019-10-31 RX ADMIN — GABAPENTIN 600 MG: 300 CAPSULE ORAL at 21:29

## 2019-10-31 RX ADMIN — FLUPHENAZINE HYDROCHLORIDE 1 MG: 1 TABLET, FILM COATED ORAL at 09:37

## 2019-10-31 RX ADMIN — POLYETHYLENE GLYCOL 3350 17 G: 17 POWDER, FOR SOLUTION ORAL at 16:51

## 2019-10-31 RX ADMIN — MAGNESIUM HYDROXIDE 30 ML: 400 SUSPENSION ORAL at 15:07

## 2019-10-31 RX ADMIN — QUETIAPINE FUMARATE 25 MG: 25 TABLET ORAL at 20:23

## 2019-10-31 RX ADMIN — GABAPENTIN 600 MG: 300 CAPSULE ORAL at 09:37

## 2019-10-31 RX ADMIN — QUETIAPINE FUMARATE 50 MG: 50 TABLET ORAL at 21:34

## 2019-10-31 RX ADMIN — LORAZEPAM 1 MG: 1 TABLET ORAL at 13:17

## 2019-10-31 ASSESSMENT — ACTIVITIES OF DAILY LIVING (ADL)
ORAL_HYGIENE: INDEPENDENT
HYGIENE/GROOMING: INDEPENDENT
DRESS: INDEPENDENT
ORAL_HYGIENE: INDEPENDENT
LAUNDRY: WITH SUPERVISION
HYGIENE/GROOMING: INDEPENDENT
DRESS: INDEPENDENT

## 2019-10-31 NOTE — PROGRESS NOTES
"Pt was observed withdrawn to their room during the beginning portion of the evening sitting in the doorway of their room talking to their 1:1 staff. An art therapy group was announced, writer asked pt if they wanted to join to which they replied \"no\". Pt was then asked by their roommate if they wanted to attend to which they replied \"I wasn't going to but I guess I'll try it out\". Pt then attended an OT therapy group where they were observed to participate appropriately. During check-in with writer they reported that they are being committed. They stated that they plan to go to court next week and fight it. Pt states they want to fight it because they have no hope that things are going to get better and they just want to end it. Pt states they are experiencing auditory hallucinations that are telling them to \"kill yourself\", \"God wants you to kill yourself\". While saying this pt was observed to smile off and on.      10/30/19 2106   Behavioral Health   Hallucinations auditory   Thinking poor concentration;distractable   Orientation place: oriented;person: oriented;date: oriented;time: oriented   Memory baseline memory   Insight poor   Judgement impaired   Eye Contact at examiner   Affect full range affect   Mood mood is calm   Physical Appearance/Attire attire appropriate to age and situation   Hygiene well groomed   Suicidality thoughts and plan   1. Wish to be Dead (Recent) Yes   2. Non-Specific Active Suicidal Thoughts (Recent) Yes   Self Injury thoughts only   Elopement Statements about wanting to leave   Activity   (Present in milieu)   Speech clear;coherent   Medication Sensitivity no stated side effects;no observed side effects   Psychomotor / Gait balanced;steady   Psycho Education   Type of Intervention 1:1 intervention   Response participates, initiates socially appropriate   Hours 0.5   Treatment Detail Check-in   Activities of Daily Living   Hygiene/Grooming independent   Oral Hygiene independent   Dress " independent   Room Organization independent

## 2019-10-31 NOTE — PROGRESS NOTES
10/30/19 [facility]   Art Therapy   Type of Intervention structured groups   Response participated with encouragement   Hours 1   Treatment Detail    (Art Therapy- watercolor resist- Halloween theme)   Goal- cope, regulate and express through Art Therapy directive     Outcome- pt attended the Art Therapy group and was engaged. Shewas enjoying experimenting with the watercolor resist technique. She enjoyed selecting music for group.

## 2019-10-31 NOTE — PLAN OF CARE
"Ayana active on unit throughout day-social with staff and peers-continues on SIO for safety due to continued voices telling her to kill self and stated desire to kill self-participating in most grps-states mood is \"fine\"-\"That's what I don't understand, my mood is fine, so why do I want to kill myself?\"-for most of day shift pleasant and cooperative-late afternoon became agitated initially stating she felt anxious and like she wanted to punch something-Received Ativan 1 mg PRN at 1630  "

## 2019-10-31 NOTE — PROGRESS NOTES
Re: LESLIE Shea (Bear)    Exam date: 19 at 10:30am  Hearin19 time TBD  Patient : Katiuska Scott - [phone]    Joana Marroquin, [facility], [facility]
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
## 2019-11-01 ENCOUNTER — APPOINTMENT (OUTPATIENT)
Dept: GENERAL RADIOLOGY | Facility: CLINIC | Age: 29
End: 2019-11-01
Payer: COMMERCIAL

## 2019-11-01 PROCEDURE — 25000128 H RX IP 250 OP 636: Performed by: PSYCHIATRY & NEUROLOGY

## 2019-11-01 PROCEDURE — 25000132 ZZH RX MED GY IP 250 OP 250 PS 637: Performed by: PSYCHIATRY & NEUROLOGY

## 2019-11-01 PROCEDURE — 25000132 ZZH RX MED GY IP 250 OP 250 PS 637: Performed by: STUDENT IN AN ORGANIZED HEALTH CARE EDUCATION/TRAINING PROGRAM

## 2019-11-01 PROCEDURE — 99232 SBSQ HOSP IP/OBS MODERATE 35: CPT | Mod: GC | Performed by: PSYCHIATRY & NEUROLOGY

## 2019-11-01 PROCEDURE — 12400001 ZZH R&B MH UMMC

## 2019-11-01 PROCEDURE — 73130 X-RAY EXAM OF HAND: CPT | Mod: RT

## 2019-11-01 RX ORDER — ACETAMINOPHEN 325 MG/1
325 TABLET ORAL EVERY 4 HOURS PRN
Status: DISCONTINUED | OUTPATIENT
Start: 2019-11-01 | End: 2019-11-10

## 2019-11-01 RX ORDER — FLUPHENAZINE HYDROCHLORIDE 1 MG/1
3 TABLET ORAL AT BEDTIME
Status: DISCONTINUED | OUTPATIENT
Start: 2019-11-01 | End: 2019-11-07

## 2019-11-01 RX ORDER — FEXOFENADINE HCL 60 MG/1
60 TABLET, FILM COATED ORAL DAILY
Status: DISCONTINUED | OUTPATIENT
Start: 2019-11-01 | End: 2019-12-17 | Stop reason: HOSPADM

## 2019-11-01 RX ORDER — QUETIAPINE FUMARATE 25 MG/1
25 TABLET, FILM COATED ORAL
Status: DISCONTINUED | OUTPATIENT
Start: 2019-11-01 | End: 2019-11-04

## 2019-11-01 RX ADMIN — GABAPENTIN 600 MG: 300 CAPSULE ORAL at 08:47

## 2019-11-01 RX ADMIN — FLUPHENAZINE HYDROCHLORIDE 3 MG: 1 TABLET, FILM COATED ORAL at 22:06

## 2019-11-01 RX ADMIN — NICOTINE POLACRILEX 8 MG: 4 GUM, CHEWING ORAL at 12:45

## 2019-11-01 RX ADMIN — LITHIUM CARBONATE 900 MG: 450 TABLET, EXTENDED RELEASE ORAL at 22:07

## 2019-11-01 RX ADMIN — GABAPENTIN 600 MG: 300 CAPSULE ORAL at 22:06

## 2019-11-01 RX ADMIN — ACETAMINOPHEN 325 MG: 325 TABLET, FILM COATED ORAL at 11:17

## 2019-11-01 RX ADMIN — NICOTINE POLACRILEX 8 MG: 4 GUM, CHEWING ORAL at 10:14

## 2019-11-01 RX ADMIN — LORAZEPAM 1 MG: 1 TABLET ORAL at 13:57

## 2019-11-01 RX ADMIN — OLANZAPINE 10 MG: 10 INJECTION, POWDER, LYOPHILIZED, FOR SOLUTION INTRAMUSCULAR at 16:57

## 2019-11-01 RX ADMIN — NICOTINE POLACRILEX 8 MG: 4 GUM, CHEWING ORAL at 08:49

## 2019-11-01 RX ADMIN — NICOTINE POLACRILEX 8 MG: 4 GUM, CHEWING ORAL at 13:38

## 2019-11-01 RX ADMIN — GABAPENTIN 600 MG: 300 CAPSULE ORAL at 13:38

## 2019-11-01 RX ADMIN — LITHIUM CARBONATE 300 MG: 300 TABLET, EXTENDED RELEASE ORAL at 08:47

## 2019-11-01 RX ADMIN — QUETIAPINE FUMARATE 50 MG: 50 TABLET ORAL at 22:07

## 2019-11-01 RX ADMIN — QUETIAPINE FUMARATE 25 MG: 25 TABLET ORAL at 13:57

## 2019-11-01 RX ADMIN — BENZTROPINE MESYLATE 1 MG: 1 TABLET ORAL at 13:38

## 2019-11-01 RX ADMIN — FLUPHENAZINE HYDROCHLORIDE 1 MG: 1 TABLET, FILM COATED ORAL at 08:47

## 2019-11-01 RX ADMIN — FEXOFENADINE HCL 60 MG: 60 TABLET, FILM COATED ORAL at 17:22

## 2019-11-01 RX ADMIN — NICOTINE 1 PATCH: 14 PATCH, EXTENDED RELEASE TRANSDERMAL at 08:47

## 2019-11-01 ASSESSMENT — ACTIVITIES OF DAILY LIVING (ADL)
HYGIENE/GROOMING: INDEPENDENT
DRESS: INDEPENDENT
ORAL_HYGIENE: INDEPENDENT

## 2019-11-01 NOTE — PROGRESS NOTES
Typical day. Oppositional. Needs lots of encouragement. Negative and hopeless when talking about long term treatment. Patient punching walls at this time. Remains on 1 to 1 staffing. Heightened monitoring at this time. Staff with patient at this time trying to defuse current behavior.          11/01/19 1400   Behavioral Health   Hallucinations auditory   Thinking distractable   Orientation person: oriented;place: oriented;date: oriented;time: oriented   Memory baseline memory   Insight poor   Judgement impaired   Eye Contact at examiner   Affect irritable;angry;incongruent   Mood hopeless;anxious;irritable   1. Wish to be Dead (Recent) Yes   2. Non-Specific Active Suicidal Thoughts (Recent)   (Thoughts only. Vague with information.)

## 2019-11-01 NOTE — PROGRESS NOTES
Ayana approx 1405 post meeting with -went to room and angrily punched wall with right fist-stated she was angry because  told her she can not win case fighting commitment and she should just agree to tx plan-several abraded knuckles and swelling-offered and accepted ice pack-seen by Dr Clark

## 2019-11-01 NOTE — PROGRESS NOTES
"Client's  called early in shift and faxed a form which she requested client sign and date. It was a waiver and by signing it, the  said that Ayana will not have to go to court on Monday. Ayana read the form and signed it.     Client had xray of right hand done and had returned to unit with staff and with security. She requested zyprexa shot. Asked why she preferred a shot when most people take pill? Her answer, \"it works faster.\" Asked her if she felt agitated or had symptoms of psychosis, she responded \"yes\" to agitation and said that she had voices.   "

## 2019-11-01 NOTE — PROGRESS NOTES
"When the shift began staff from another floor entered the unit and began talking to other staff for report as they were beginning their shift on station 22. Pt quickly said \"have you ever just met someone and you immediately knew you didn't like them, that's how I feel about her\". Later pt had their nurse come to their room and told their nurse \"I feel so agitated right now\". When asked why pt stated \"her friend\" and pointed at writer, and was referencing the female staff from the beginning of the evening. Pt then asked nurse and writer \"can I punch the walls\". Pt was redirected and took PRN medication and continued to talk about how the female staff was making them feel \"so paranoid right now\" \"the voices are so bad\". When asked what their thoughts about the staff were pt stated \"that she can read my mind or something\". Pt was observed to calm down for a brief period, and did not make any statements about the staff for the remainder of the evening. Later pt was walking the song and was observed to loiter near the exit door pressing their body against the door and face against the window. While pt was walking back to their room they walked past room 235 and overheard a pt sleeping. Pt started to look in the room, then started walking in the room as if it was their own room. Writer had to physically redirect pt and stated \"why are you going in this room, you know it's not appropriate to go in another patient's room\", pt began laughing and stated \"I wanted to see if she was sleeping\". Pt then walked down the song towards their room and began running their body into the wall and shower doors, not hard enough to harm their body saying \"Sorry I'm just bored\". Pt was offered activities but would did not want to participate in either. Pt then went in their room and rested laying face down. Pt then slid all the way off their bed onto the floor. Pt then went and sat in a chair in the song. Pt then asked writer \"what happens " "if I call 9-1-1\", when told what restrictions may be put on their phone use as a result pt replied \"that sucks, I guess I won't do that\". While in this chair pt was asked by another pt \"what is the first thing you want to do when you leave here\". This pt looked at writer and said \"can you plug your ears\", writer replied \"no\". Pt then said to pt A.M. \"smoke a bowl\". This pt and pt A.M. then began talking about using marijuana recreationally and needed to be redirected from this conversation. During conversation pt also told writer that they plan to refuse to go to their upcoming court date. Pt continues to make suicidal statements such as that they \"want it all to end\" throughout the evening.      10/31/19 2053   Behavioral Health   Hallucinations auditory   Thinking poor concentration;distractable;paranoid   Orientation place: oriented;person: oriented;date: oriented;time: oriented   Memory baseline memory   Insight poor   Judgement impaired   Eye Contact at examiner   Affect full range affect   Mood mood is calm   Physical Appearance/Attire attire appropriate to age and situation   Hygiene   (adequate)   Suicidality thoughts and plan   1. Wish to be Dead (Recent) Yes   2. Non-Specific Active Suicidal Thoughts (Recent) Yes   Self Injury thoughts only   Elopement Loitering near exit doors;Statements about wanting to leave   Activity restless   Speech clear;coherent   Medication Sensitivity no observed side effects   Psychomotor / Gait balanced;steady   Psycho Education   Type of Intervention 1:1 intervention   Response participates, initiates socially appropriate   Hours 0.5   Treatment Detail Check-in   Activities of Daily Living   Hygiene/Grooming independent   Oral Hygiene independent   Dress independent   Room Organization independent     "
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
## 2019-11-02 PROCEDURE — 25000132 ZZH RX MED GY IP 250 OP 250 PS 637: Performed by: STUDENT IN AN ORGANIZED HEALTH CARE EDUCATION/TRAINING PROGRAM

## 2019-11-02 PROCEDURE — 25000132 ZZH RX MED GY IP 250 OP 250 PS 637: Performed by: PSYCHIATRY & NEUROLOGY

## 2019-11-02 PROCEDURE — 12400001 ZZH R&B MH UMMC

## 2019-11-02 RX ADMIN — NICOTINE POLACRILEX 8 MG: 4 GUM, CHEWING ORAL at 21:02

## 2019-11-02 RX ADMIN — FLUPHENAZINE HYDROCHLORIDE 1 MG: 1 TABLET, FILM COATED ORAL at 11:45

## 2019-11-02 RX ADMIN — LORAZEPAM 1 MG: 1 TABLET ORAL at 16:50

## 2019-11-02 RX ADMIN — GABAPENTIN 600 MG: 300 CAPSULE ORAL at 16:50

## 2019-11-02 RX ADMIN — NICOTINE POLACRILEX 8 MG: 4 GUM, CHEWING ORAL at 19:31

## 2019-11-02 RX ADMIN — BENZTROPINE MESYLATE 1 MG: 1 TABLET ORAL at 11:49

## 2019-11-02 RX ADMIN — LITHIUM CARBONATE 900 MG: 450 TABLET, EXTENDED RELEASE ORAL at 20:59

## 2019-11-02 RX ADMIN — QUETIAPINE FUMARATE 25 MG: 25 TABLET ORAL at 16:52

## 2019-11-02 RX ADMIN — FLUPHENAZINE HYDROCHLORIDE 3 MG: 1 TABLET, FILM COATED ORAL at 20:59

## 2019-11-02 RX ADMIN — FEXOFENADINE HCL 60 MG: 60 TABLET, FILM COATED ORAL at 11:45

## 2019-11-02 RX ADMIN — NICOTINE POLACRILEX 8 MG: 4 GUM, CHEWING ORAL at 12:11

## 2019-11-02 RX ADMIN — LITHIUM CARBONATE 300 MG: 300 TABLET, EXTENDED RELEASE ORAL at 11:45

## 2019-11-02 RX ADMIN — GABAPENTIN 600 MG: 300 CAPSULE ORAL at 20:59

## 2019-11-02 RX ADMIN — NICOTINE 1 PATCH: 14 PATCH, EXTENDED RELEASE TRANSDERMAL at 11:46

## 2019-11-02 RX ADMIN — LORAZEPAM 1 MG: 1 TABLET ORAL at 10:50

## 2019-11-02 RX ADMIN — GABAPENTIN 600 MG: 300 CAPSULE ORAL at 11:45

## 2019-11-02 RX ADMIN — QUETIAPINE FUMARATE 50 MG: 50 TABLET ORAL at 20:59

## 2019-11-02 ASSESSMENT — ACTIVITIES OF DAILY LIVING (ADL)
DRESS: INDEPENDENT
HYGIENE/GROOMING: INDEPENDENT
ORAL_HYGIENE: INDEPENDENT
LAUNDRY: WITH SUPERVISION
ORAL_HYGIENE: INDEPENDENT
HYGIENE/GROOMING: HANDWASHING;SHOWER;INDEPENDENT
LAUNDRY: WITH SUPERVISION
DRESS: INDEPENDENT

## 2019-11-02 NOTE — PROGRESS NOTES
More isolative to room today. Out only a few times. Ate very little, drank water bottles on tray. Pt presents drepressed. No shower. No groups.       11/02/19 1300   Behavioral Health   Hallucinations denies / not responding to hallucinations   Thinking poor concentration   Orientation person: oriented;place: oriented;date: oriented;time: oriented   Memory baseline memory   Insight poor   Judgement impaired   Eye Contact at examiner   Affect blunted, flat   Mood depressed   Physical Appearance/Attire attire appropriate to age and situation   Hygiene neglected grooming - unclean body, hair, teeth   Speech clear;coherent   Psychomotor / Gait balanced;steady   Psycho Education   Type of Intervention structured groups   Response refuses   Activities of Daily Living   Hygiene/Grooming independent   Oral Hygiene independent   Dress independent   Laundry with supervision   Room Organization independent

## 2019-11-02 NOTE — PROGRESS NOTES
Pt spent shift sitting in room and resting.  Pt stated that they felt frustrated from their meeting with their  earlier that day.  Pt stated that they wanted to cause trouble and make a commotion.  Pt self deescalated and decided they wanted a PRN and spoke with their RN.  Pt slept for a large portion of the shift.  Pt did not attend OT or community meeting.  Pt ate dinner and snacks.  Pt endorses SI, SIB, and hallucinations.         11/01/19 7808   Behavioral Health   Hallucinations auditory   Thinking distractable   Orientation person: oriented;place: oriented;date: oriented;time: oriented   Memory baseline memory   Insight poor   Judgement impaired   Eye Contact at examiner   Affect irritable;angry   Mood anxious;irritable   Physical Appearance/Attire disheveled   Hygiene neglected grooming - unclean body, hair, teeth   1. Wish to be Dead (Recent) Yes   2. Non-Specific Active Suicidal Thoughts (Recent) Yes   Speech clear;coherent   Psychomotor / Gait balanced;steady   Psycho Education   Type of Intervention 1:1 intervention   Response participates, initiates socially appropriate   Hours 0.5   Treatment Detail check-in   Activities of Daily Living   Hygiene/Grooming independent   Oral Hygiene independent   Dress independent   Room Organization independent

## 2019-11-03 LAB — LITHIUM SERPL-SCNC: 1.14 MMOL/L (ref 0.6–1.2)

## 2019-11-03 PROCEDURE — 25000132 ZZH RX MED GY IP 250 OP 250 PS 637: Performed by: STUDENT IN AN ORGANIZED HEALTH CARE EDUCATION/TRAINING PROGRAM

## 2019-11-03 PROCEDURE — 25000128 H RX IP 250 OP 636: Performed by: PSYCHIATRY & NEUROLOGY

## 2019-11-03 PROCEDURE — 80178 ASSAY OF LITHIUM: CPT | Performed by: STUDENT IN AN ORGANIZED HEALTH CARE EDUCATION/TRAINING PROGRAM

## 2019-11-03 PROCEDURE — 25000132 ZZH RX MED GY IP 250 OP 250 PS 637: Performed by: PSYCHIATRY & NEUROLOGY

## 2019-11-03 PROCEDURE — 12400001 ZZH R&B MH UMMC

## 2019-11-03 PROCEDURE — 36415 COLL VENOUS BLD VENIPUNCTURE: CPT | Performed by: STUDENT IN AN ORGANIZED HEALTH CARE EDUCATION/TRAINING PROGRAM

## 2019-11-03 RX ADMIN — FLUPHENAZINE HYDROCHLORIDE 1 MG: 1 TABLET, FILM COATED ORAL at 08:27

## 2019-11-03 RX ADMIN — GABAPENTIN 600 MG: 300 CAPSULE ORAL at 08:27

## 2019-11-03 RX ADMIN — QUETIAPINE FUMARATE 50 MG: 50 TABLET ORAL at 21:39

## 2019-11-03 RX ADMIN — GABAPENTIN 600 MG: 300 CAPSULE ORAL at 13:55

## 2019-11-03 RX ADMIN — NICOTINE POLACRILEX 8 MG: 4 GUM, CHEWING ORAL at 19:07

## 2019-11-03 RX ADMIN — NICOTINE POLACRILEX 8 MG: 4 GUM, CHEWING ORAL at 12:43

## 2019-11-03 RX ADMIN — FEXOFENADINE HCL 60 MG: 60 TABLET, FILM COATED ORAL at 08:27

## 2019-11-03 RX ADMIN — NICOTINE 1 PATCH: 14 PATCH, EXTENDED RELEASE TRANSDERMAL at 08:27

## 2019-11-03 RX ADMIN — LITHIUM CARBONATE 900 MG: 450 TABLET, EXTENDED RELEASE ORAL at 21:24

## 2019-11-03 RX ADMIN — LORAZEPAM 1 MG: 1 TABLET ORAL at 16:25

## 2019-11-03 RX ADMIN — LORAZEPAM 1 MG: 1 TABLET ORAL at 12:43

## 2019-11-03 RX ADMIN — NICOTINE POLACRILEX 8 MG: 4 GUM, CHEWING ORAL at 13:57

## 2019-11-03 RX ADMIN — BENZTROPINE MESYLATE 1 MG: 1 TABLET ORAL at 17:19

## 2019-11-03 RX ADMIN — LITHIUM CARBONATE 300 MG: 300 TABLET, EXTENDED RELEASE ORAL at 08:27

## 2019-11-03 RX ADMIN — BENZOCAINE, MENTHOL 1 LOZENGE: 15; 3.6 LOZENGE ORAL at 13:55

## 2019-11-03 RX ADMIN — NICOTINE POLACRILEX 8 MG: 4 GUM, CHEWING ORAL at 17:19

## 2019-11-03 RX ADMIN — QUETIAPINE FUMARATE 25 MG: 25 TABLET ORAL at 17:19

## 2019-11-03 RX ADMIN — OLANZAPINE 10 MG: 10 INJECTION, POWDER, LYOPHILIZED, FOR SOLUTION INTRAMUSCULAR at 18:39

## 2019-11-03 RX ADMIN — FLUPHENAZINE HYDROCHLORIDE 3 MG: 1 TABLET, FILM COATED ORAL at 21:23

## 2019-11-03 RX ADMIN — GABAPENTIN 600 MG: 300 CAPSULE ORAL at 21:23

## 2019-11-03 ASSESSMENT — ACTIVITIES OF DAILY LIVING (ADL)
ORAL_HYGIENE: INDEPENDENT
HYGIENE/GROOMING: INDEPENDENT
DRESS: STREET CLOTHES;INDEPENDENT
ORAL_HYGIENE: INDEPENDENT
DRESS: INDEPENDENT
LAUNDRY: WITH SUPERVISION
HYGIENE/GROOMING: INDEPENDENT

## 2019-11-03 NOTE — PROGRESS NOTES
"Pt was isolative and withdrawn to their room, stated they did not feel well today. Pt reports \"head cold\", overall cold symptoms, and feeling tired. Pt reports constant thoughts of suicide, not wanting to be alive, stated they dream about it. Pt did not attend groups, was out in the milieu at meal times. Pt reports anxiety, denies depression.      11/03/19 1319   Behavioral Health   Hallucinations denies / not responding to hallucinations   Thinking distractable   Orientation person: oriented;place: oriented;date: oriented   Memory baseline memory   Insight poor   Judgement impaired   Eye Contact at examiner   Affect blunted, flat   Mood anxious   Physical Appearance/Attire attire appropriate to age and situation   Hygiene neglected grooming - unclean body, hair, teeth   Suicidality chronic thoughts with no stated plan   1. Wish to be Dead (Recent) Yes   2. Non-Specific Active Suicidal Thoughts (Recent) Yes   Self Injury chronic thoughts with no stated plan   Activity restless   Speech clear;coherent   Medication Sensitivity no stated side effects;no observed side effects   Psychomotor / Gait balanced;steady   Safety   Suicidality SIO (Status Individual Observation)  (NOTE - order will specify distance;Room door open   Assault private room   Elopement signs posted on unit entrance / exit doors   Activities of Daily Living   Hygiene/Grooming independent   Oral Hygiene independent   Dress independent   Laundry with supervision   Room Organization independent   Activity   Activity Assistance Provided independent     "

## 2019-11-03 NOTE — PLAN OF CARE
"Ayana has been somewhat isolative to her room, drawing in a notebook. She was out for dinner and to watch TV, talk on phone. Her affect is flat. She says that she is doing \"OK\" tonight. Still has voices to kill self. High potential for acting upon thoughts if allowed to do so.  "

## 2019-11-04 ENCOUNTER — VIRTUAL VISIT (OUTPATIENT)
Dept: PSYCHIATRY | Facility: CLINIC | Age: 29
End: 2019-11-04
Payer: COMMERCIAL

## 2019-11-04 DIAGNOSIS — F25.0 SCHIZOAFFECTIVE DISORDER, BIPOLAR TYPE (H): Primary | ICD-10-CM

## 2019-11-04 PROCEDURE — G0177 OPPS/PHP; TRAIN & EDUC SERV: HCPCS

## 2019-11-04 PROCEDURE — 25000132 ZZH RX MED GY IP 250 OP 250 PS 637: Performed by: STUDENT IN AN ORGANIZED HEALTH CARE EDUCATION/TRAINING PROGRAM

## 2019-11-04 PROCEDURE — 25000132 ZZH RX MED GY IP 250 OP 250 PS 637: Performed by: PSYCHIATRY & NEUROLOGY

## 2019-11-04 PROCEDURE — 99232 SBSQ HOSP IP/OBS MODERATE 35: CPT | Mod: GC | Performed by: PSYCHIATRY & NEUROLOGY

## 2019-11-04 PROCEDURE — 12400001 ZZH R&B MH UMMC

## 2019-11-04 RX ORDER — QUETIAPINE FUMARATE 100 MG/1
100 TABLET, FILM COATED ORAL AT BEDTIME
Status: DISCONTINUED | OUTPATIENT
Start: 2019-11-04 | End: 2019-12-17 | Stop reason: HOSPADM

## 2019-11-04 RX ORDER — OLANZAPINE 10 MG/1
10 TABLET, ORALLY DISINTEGRATING ORAL
Status: DISCONTINUED | OUTPATIENT
Start: 2019-11-04 | End: 2019-11-13

## 2019-11-04 RX ORDER — TOPIRAMATE 50 MG/1
50 TABLET, FILM COATED ORAL DAILY
Status: DISCONTINUED | OUTPATIENT
Start: 2019-11-04 | End: 2019-11-05

## 2019-11-04 RX ADMIN — BENZTROPINE MESYLATE 1 MG: 1 TABLET ORAL at 08:54

## 2019-11-04 RX ADMIN — GABAPENTIN 600 MG: 300 CAPSULE ORAL at 19:24

## 2019-11-04 RX ADMIN — LORAZEPAM 1 MG: 1 TABLET ORAL at 12:40

## 2019-11-04 RX ADMIN — LORAZEPAM 1 MG: 1 TABLET ORAL at 08:18

## 2019-11-04 RX ADMIN — NICOTINE POLACRILEX 4 MG: 4 GUM, CHEWING ORAL at 08:55

## 2019-11-04 RX ADMIN — FLUPHENAZINE HYDROCHLORIDE 1 MG: 1 TABLET, FILM COATED ORAL at 08:18

## 2019-11-04 RX ADMIN — FEXOFENADINE HCL 60 MG: 60 TABLET, FILM COATED ORAL at 08:18

## 2019-11-04 RX ADMIN — NICOTINE POLACRILEX 8 MG: 4 GUM, CHEWING ORAL at 08:19

## 2019-11-04 RX ADMIN — GABAPENTIN 600 MG: 300 CAPSULE ORAL at 08:19

## 2019-11-04 RX ADMIN — TOPIRAMATE 50 MG: 50 TABLET ORAL at 14:20

## 2019-11-04 RX ADMIN — NICOTINE POLACRILEX 8 MG: 4 GUM, CHEWING ORAL at 10:44

## 2019-11-04 RX ADMIN — OLANZAPINE 10 MG: 10 TABLET, ORALLY DISINTEGRATING ORAL at 17:42

## 2019-11-04 RX ADMIN — LITHIUM CARBONATE 900 MG: 450 TABLET, EXTENDED RELEASE ORAL at 19:24

## 2019-11-04 RX ADMIN — NICOTINE 1 PATCH: 14 PATCH, EXTENDED RELEASE TRANSDERMAL at 08:17

## 2019-11-04 RX ADMIN — NICOTINE POLACRILEX 8 MG: 4 GUM, CHEWING ORAL at 13:57

## 2019-11-04 RX ADMIN — LITHIUM CARBONATE 300 MG: 300 TABLET, EXTENDED RELEASE ORAL at 08:19

## 2019-11-04 RX ADMIN — FLUPHENAZINE HYDROCHLORIDE 3 MG: 1 TABLET, FILM COATED ORAL at 19:24

## 2019-11-04 RX ADMIN — QUETIAPINE FUMARATE 100 MG: 100 TABLET ORAL at 19:28

## 2019-11-04 RX ADMIN — GABAPENTIN 600 MG: 300 CAPSULE ORAL at 13:57

## 2019-11-04 ASSESSMENT — ACTIVITIES OF DAILY LIVING (ADL)
ORAL_HYGIENE: INDEPENDENT
HYGIENE/GROOMING: INDEPENDENT
DRESS: STREET CLOTHES;INDEPENDENT
DRESS: INDEPENDENT
ORAL_HYGIENE: INDEPENDENT
HYGIENE/GROOMING: INDEPENDENT

## 2019-11-04 NOTE — PROGRESS NOTES
Pt presented with full range affect and calm mood. Pt reported feeling slightly anxious because of the tension going on in the milieu lately. Pt stated she was still feeling suicidal and it hasn't decreased at all since she was admitted. Pt told writer she would still try to commit suicide if she had the chance. Pt was still hearing voices and they had been only negative lately. However, Pt reported her mood had improved during her stay in the hospital and agreed that the treatment was helping. Pt also noticed her medication had caused her appetite to increased and her treatment team was working on that. Pt was pleasant and polite throughout the check-in.        11/04/19 1400   Behavioral Health   Hallucinations auditory   Thinking distractable   Orientation person: oriented;place: oriented   Memory baseline memory   Insight poor   Judgement impaired   Eye Contact at examiner   Affect full range affect   Mood mood is calm   Physical Appearance/Attire attire appropriate to age and situation   Hygiene well groomed   1. Wish to be Dead (Recent) Yes   2. Non-Specific Active Suicidal Thoughts (Recent) Yes   Elopement   (None stated or observed)   Activity isolative   Speech clear;coherent   Medication Sensitivity   (Increased appetitie)   Psychomotor / Gait balanced;steady   Psycho Education   Type of Intervention 1:1 intervention   Response participates, initiates socially appropriate   Hours 0.5   Treatment Detail check in   Activities of Daily Living   Hygiene/Grooming independent   Oral Hygiene independent   Dress independent   Room Organization independent

## 2019-11-04 NOTE — PROGRESS NOTES
"Pt was mostly isolative and socially withdrawn throughout the shift. Pt was observed laying/sleeping in room, reading, listening to music, and eating dinner/snack. Pt presented with a full range affect and remained calm throughout the shift. Pt endorsed being actively suicidal and stated she \"has a plan\" here in the hospital to kill her self but does not have a weapon. Pt stated she would go through with plan if staff was not watching her. A search was done on the pt's room and nothing was found. It should be noted that the pt said this in an odd, almost sarcastic or playful tone, however she was more serious than she had been throughout the day.        11/03/19 2112   Behavioral Health   Hallucinations denies / not responding to hallucinations   Thinking distractable   Orientation person: oriented;place: oriented   Memory   (appears baseline)   Insight poor   Judgement impaired   Eye Contact at examiner   Affect full range affect   Mood mood is calm   Physical Appearance/Attire attire appropriate to age and situation   Hygiene   (adequate)   Suicidality thoughts and plan;active   Self Injury   (No, only SI)   Elopement   (no apparent risk)   Activity isolative;withdrawn   Speech clear;coherent   Medication Sensitivity no stated side effects;no observed side effects   Psychomotor / Gait balanced;steady   Activities of Daily Living   Hygiene/Grooming independent   Oral Hygiene independent   Dress street clothes;independent   Laundry   (pt did not do laundry)   Room Organization independent   Activity   Activity Assistance Provided independent     "

## 2019-11-04 NOTE — PROGRESS NOTES
"CLINICAL NUTRITION SERVICES - REASSESSMENT NOTE     Nutrition Prescription    RECOMMENDATIONS FOR MDs/PROVIDERS TO ORDER:  Pt denied needing/wanting weight loss education at this time    Malnutrition Status:    Patient does not meet two of the criteria necessary for diagnosing malnutrition    Recommendations already ordered by Registered Dietitian (RD):  -Discontinue Boost BID  -Continue Propel Zero TID  -Food Snacks TID: 10 AM - Bagel with butter, 2 PM - Jello, 8 PM - Yogurt    Future/Additional Recommendations:  -Monitor meal and snack intakes  -Monitor weight trends  -Monitor for future diet education needs      EVALUATION OF THE PROGRESS TOWARD GOALS   Diet: Regular - Propel Zero TID at meals   Supplement: Boost BID  Intake: Pt reports good appetite, still hungry with current interventions      NEW FINDINGS   RD already following for intakes/supplements/snacks. Additionally noted new consult for - client requests consultation for weight loss. Pt herself reports having hunger with current interventions, would like current interventions adjusted. Would like Boost discontinued, doesn't want anymore. Would like to continue Propel TID. Would like snacks TID, reviewed options and encouraged healthful choices, pt herself denied needing education stating \"the dr put me on an appetite suppressant so I don't need that\".   10/27/19 0900 91.2 kg (201 lb)   10/24/19 0827 89.7 kg (197 lb 11.2 oz)   10/22/19 1555 88.5 kg (195 lb 1.6 oz)       MALNUTRITION  % Intake: No decreased intake noted  % Weight Loss: None noted  Subcutaneous Fat Loss: None observed  Muscle Loss: None observed  Fluid Accumulation/Edema: None noted  Malnutrition Diagnosis: Patient does not meet two of the above criteria necessary for diagnosing malnutrition    Previous Goals   Patient to consume % of nutritionally adequate meal trays TID, or the equivalent with supplements/snacks.  Evaluation: Progressing     Previous Nutrition Diagnosis  Predicted " inadequate protein-energy intake related to h/o of poor appetite and eating nothing 7 days prior to admission.    Evaluation: Improving    CURRENT NUTRITION DIAGNOSIS  Predicted excessive nutrient intake related to increased hunger as evidenced by pt report with increased weight trends since admission to facility     INTERVENTIONS  Implementation  Modify composition of meals/snacks    Goals  Patient to consume % of nutritionally adequate meal trays TID, or the equivalent with supplements/snacks.    Monitoring/Evaluation  Progress toward goals will be monitored and evaluated per protocol.
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
## 2019-11-04 NOTE — PROGRESS NOTES
Ayana was in & out of group this date. She expressed the most interest in making progress on her structured creative expression project, and worked intermittently on this. Reported frustrations about the present leveo of noise & chaos on the unit.        11/04/19 1300   Occupational Therapy   Type of Intervention structured groups   Response Initiates, socially acceptable   Hours 1

## 2019-11-04 NOTE — PLAN OF CARE
BEHAVIORAL TEAM DISCUSSION    Participants:   Dr. Morin, Attending Psychiatrist  Sunni Clark, PGY-1  Ismael Griffiths PGY2  Joana Marroquin, LPCC, LADC, CTC  Vangie Gonzalez, RN  Vu Burger, OT  Progress: Patient remains on SIB to ensure her safety  Anticipated length of stay: Undetermined  Continued Stay Criteria/Rationale: Patient is in the middle of civil commitment process  Medical/Physical: No acute issues  Precautions:   Behavioral Orders   Procedures    Code 1 - Restrict to Unit    Code 2     For xray    Elopement precautions    Routine Programming     As clinically indicated    Self Injury Precaution    Status 15     Every 15 minutes.    Status Individual Observation     Order Specific Question:   CONTINUOUS 24 hours / day     Answer:   Other     Order Specific Question:   Specify distance     Answer:   15 feet     Order Specific Question:   Indications for SIO     Answer:   Self-injury risk     Order Specific Question:   Indications for SIO     Answer:   Suicide risk     Order Specific Question:   Indications for SIO     Answer:   Medical equipment / ligature risk    Suicide precautions     Patients on Suicide Precautions should have a Combination Diet ordered that includes a Diet selection(s) AND a Behavioral Tray selection for Safe Tray - with utensils, or Safe Tray - NO utensils     Plan: Patient is in the civil commitment process, exam is scheduled for today. At present patient is refusing to attend. Discharge planning pending civil commitment results.  Rationale for change in precautions or plan: No change at present-will continue to monitor and adjust as needed

## 2019-11-05 PROCEDURE — 12400001 ZZH R&B MH UMMC

## 2019-11-05 PROCEDURE — G0177 OPPS/PHP; TRAIN & EDUC SERV: HCPCS

## 2019-11-05 PROCEDURE — 99232 SBSQ HOSP IP/OBS MODERATE 35: CPT | Mod: GC | Performed by: PSYCHIATRY & NEUROLOGY

## 2019-11-05 PROCEDURE — 25000132 ZZH RX MED GY IP 250 OP 250 PS 637: Performed by: PSYCHIATRY & NEUROLOGY

## 2019-11-05 PROCEDURE — 25000132 ZZH RX MED GY IP 250 OP 250 PS 637: Performed by: STUDENT IN AN ORGANIZED HEALTH CARE EDUCATION/TRAINING PROGRAM

## 2019-11-05 PROCEDURE — 90837 PSYTX W PT 60 MINUTES: CPT

## 2019-11-05 RX ORDER — LORAZEPAM 1 MG/1
1 TABLET ORAL ONCE
Status: COMPLETED | OUTPATIENT
Start: 2019-11-05 | End: 2019-11-05

## 2019-11-05 RX ADMIN — FEXOFENADINE HCL 60 MG: 60 TABLET, FILM COATED ORAL at 09:30

## 2019-11-05 RX ADMIN — OLANZAPINE 10 MG: 10 TABLET, ORALLY DISINTEGRATING ORAL at 03:20

## 2019-11-05 RX ADMIN — NICOTINE POLACRILEX 8 MG: 4 GUM, CHEWING ORAL at 10:57

## 2019-11-05 RX ADMIN — NICOTINE 1 PATCH: 14 PATCH, EXTENDED RELEASE TRANSDERMAL at 09:31

## 2019-11-05 RX ADMIN — NICOTINE POLACRILEX 8 MG: 4 GUM, CHEWING ORAL at 19:39

## 2019-11-05 RX ADMIN — TOPIRAMATE 50 MG: 50 TABLET ORAL at 09:30

## 2019-11-05 RX ADMIN — BENZTROPINE MESYLATE 1 MG: 1 TABLET ORAL at 10:54

## 2019-11-05 RX ADMIN — GABAPENTIN 600 MG: 300 CAPSULE ORAL at 09:31

## 2019-11-05 RX ADMIN — LITHIUM CARBONATE 900 MG: 450 TABLET, EXTENDED RELEASE ORAL at 19:22

## 2019-11-05 RX ADMIN — OLANZAPINE 10 MG: 10 TABLET, ORALLY DISINTEGRATING ORAL at 11:14

## 2019-11-05 RX ADMIN — LORAZEPAM 1 MG: 1 TABLET ORAL at 22:02

## 2019-11-05 RX ADMIN — GABAPENTIN 600 MG: 300 CAPSULE ORAL at 19:22

## 2019-11-05 RX ADMIN — NICOTINE POLACRILEX 8 MG: 4 GUM, CHEWING ORAL at 09:29

## 2019-11-05 RX ADMIN — QUETIAPINE FUMARATE 100 MG: 100 TABLET ORAL at 18:31

## 2019-11-05 RX ADMIN — FLUPHENAZINE HYDROCHLORIDE 1 MG: 1 TABLET, FILM COATED ORAL at 09:30

## 2019-11-05 RX ADMIN — OLANZAPINE 10 MG: 10 TABLET, ORALLY DISINTEGRATING ORAL at 20:22

## 2019-11-05 RX ADMIN — OLANZAPINE 10 MG: 10 TABLET, ORALLY DISINTEGRATING ORAL at 18:31

## 2019-11-05 RX ADMIN — BENZTROPINE MESYLATE 1 MG: 1 TABLET ORAL at 17:41

## 2019-11-05 RX ADMIN — LORAZEPAM 1 MG: 1 TABLET ORAL at 18:45

## 2019-11-05 RX ADMIN — GABAPENTIN 600 MG: 300 CAPSULE ORAL at 16:29

## 2019-11-05 RX ADMIN — NICOTINE POLACRILEX 8 MG: 4 GUM, CHEWING ORAL at 16:34

## 2019-11-05 RX ADMIN — LITHIUM CARBONATE 300 MG: 300 TABLET, EXTENDED RELEASE ORAL at 09:30

## 2019-11-05 RX ADMIN — LORAZEPAM 1 MG: 1 TABLET ORAL at 10:54

## 2019-11-05 RX ADMIN — FLUPHENAZINE HYDROCHLORIDE 3 MG: 1 TABLET, FILM COATED ORAL at 19:22

## 2019-11-05 ASSESSMENT — MIFFLIN-ST. JEOR: SCORE: 1659.84

## 2019-11-05 NOTE — PROGRESS NOTES
Re: Civil Committment    Patient is committed to the [facility] and the [employer] of [employer] until May 4, 2019. Copy in patient chart. Copy sent to scanning. Copy provided to patient.    CINTHYA Mckoy, ADRIANNAC

## 2019-11-05 NOTE — PROGRESS NOTES
Ayana attended 1 of 3 OT groups today. Discussed her sense that her relationship with her partner may be ending, and the sadness associated with this possibility. Continues to use her chosen art project as a primary source of coping. Agreeable & pleasant; consistently expresses appreciation for group.      11/05/19 1500   Occupational Therapy   Type of Intervention structured groups   Response Initiates, socially acceptable   Hours 1

## 2019-11-05 NOTE — PROGRESS NOTES
Pt was intermittently present in the milieu throughout the shift. Pt was observed listening to music, meeting with visitors, meeting with doctor, and eating dinner. Pt presented with a full range affect and remained calm throughout the evening. Pt endorsed feeling suicidal and stated she had a plan to commit suicide if discharged but had no plan for in the hospital and appeared to contract for safety while in hospital. Pt denied HI.        11/04/19 2047   Behavioral Health   Hallucinations denies / not responding to hallucinations   Thinking distractable   Orientation person: oriented;place: oriented   Memory   (appears baseline)   Insight poor   Judgement impaired   Eye Contact at examiner   Affect full range affect   Mood mood is calm   Physical Appearance/Attire attire appropriate to age and situation   Hygiene   (adequate)   Suicidality thoughts and plan   Self Injury plan   Elopement   (no apparent risk)   Activity restless   Speech clear;coherent   Medication Sensitivity   (no observed, pt stated facial numbness)   Psychomotor / Gait balanced;steady   Psycho Education   Type of Intervention 1:1 intervention   Response participates, initiates socially appropriate   Hours 0.5   Treatment Detail   (check-in)   Activities of Daily Living   Hygiene/Grooming independent   Oral Hygiene independent   Dress street clothes;independent   Laundry   (pt did not do laundry)   Room Organization independent   Activity   Activity Assistance Provided independent

## 2019-11-05 NOTE — PROGRESS NOTES
Patient reports numbness of skin on her face and arms. Discussed with patient that it is a possible side effect of Topamax which was started today. MD notified. VS taken per MD request. Beena Villanueva RN

## 2019-11-05 NOTE — PROGRESS NOTES
Pt was upset after care team meeting. Pt requested prn and was given requested medication. Discussed with pt why she was upset and she stated it was due to currently having to stay in the hospital and her relationship problems with her wife. Pt was pleasant in conversation and made needs known in a calm manner. Will continue to monitor.

## 2019-11-05 NOTE — PROGRESS NOTES
"Psychiatry Cross Cover Note    Subjective: Notified by staff that patient experiencing numbness/tingling of skin on face and arms.     Objective: /78 (BP Location: Right arm)   Pulse 98   Temp 98.7  F (37.1  C) (Oral)   Resp 16   Ht 1.651 m (5' 5\")   Wt 91.2 kg (201 lb)   LMP  (LMP Unknown)   SpO2 96%   BMI 33.45 kg/m      Assessment/Plan:  Numbness of skin likely secondary to topiramate initiated today. Staff reassured patient and rechecked vitals per request. Vitals within normal limits.    Chandana Malave MD  PGY-2 Psychiatry Resident      "

## 2019-11-06 PROCEDURE — 25000132 ZZH RX MED GY IP 250 OP 250 PS 637: Performed by: PSYCHIATRY & NEUROLOGY

## 2019-11-06 PROCEDURE — 12400001 ZZH R&B MH UMMC

## 2019-11-06 PROCEDURE — 99232 SBSQ HOSP IP/OBS MODERATE 35: CPT | Mod: GC | Performed by: PSYCHIATRY & NEUROLOGY

## 2019-11-06 PROCEDURE — 25000128 H RX IP 250 OP 636: Performed by: STUDENT IN AN ORGANIZED HEALTH CARE EDUCATION/TRAINING PROGRAM

## 2019-11-06 PROCEDURE — 25000132 ZZH RX MED GY IP 250 OP 250 PS 637: Performed by: STUDENT IN AN ORGANIZED HEALTH CARE EDUCATION/TRAINING PROGRAM

## 2019-11-06 RX ORDER — OLANZAPINE 10 MG/2ML
10 INJECTION, POWDER, FOR SOLUTION INTRAMUSCULAR DAILY PRN
Status: DISCONTINUED | OUTPATIENT
Start: 2019-11-06 | End: 2019-11-06

## 2019-11-06 RX ORDER — LORAZEPAM 1 MG/1
1 TABLET ORAL 4 TIMES DAILY PRN
Status: DISCONTINUED | OUTPATIENT
Start: 2019-11-06 | End: 2019-11-07

## 2019-11-06 RX ADMIN — LORAZEPAM 1 MG: 1 TABLET ORAL at 09:22

## 2019-11-06 RX ADMIN — NICOTINE POLACRILEX 8 MG: 4 GUM, CHEWING ORAL at 20:07

## 2019-11-06 RX ADMIN — NICOTINE 1 PATCH: 14 PATCH, EXTENDED RELEASE TRANSDERMAL at 09:24

## 2019-11-06 RX ADMIN — NICOTINE POLACRILEX 8 MG: 4 GUM, CHEWING ORAL at 16:59

## 2019-11-06 RX ADMIN — GABAPENTIN 600 MG: 300 CAPSULE ORAL at 20:09

## 2019-11-06 RX ADMIN — OLANZAPINE 10 MG: 10 TABLET, ORALLY DISINTEGRATING ORAL at 09:37

## 2019-11-06 RX ADMIN — QUETIAPINE FUMARATE 100 MG: 100 TABLET ORAL at 20:09

## 2019-11-06 RX ADMIN — FLUPHENAZINE HYDROCHLORIDE 1 MG: 1 TABLET, FILM COATED ORAL at 09:22

## 2019-11-06 RX ADMIN — GABAPENTIN 600 MG: 300 CAPSULE ORAL at 13:17

## 2019-11-06 RX ADMIN — LORAZEPAM 1 MG: 1 TABLET ORAL at 13:17

## 2019-11-06 RX ADMIN — LITHIUM CARBONATE 300 MG: 300 TABLET, EXTENDED RELEASE ORAL at 09:23

## 2019-11-06 RX ADMIN — NICOTINE POLACRILEX 8 MG: 4 GUM, CHEWING ORAL at 13:17

## 2019-11-06 RX ADMIN — FLUPHENAZINE HYDROCHLORIDE 3 MG: 1 TABLET, FILM COATED ORAL at 20:08

## 2019-11-06 RX ADMIN — LORAZEPAM 1 MG: 1 TABLET ORAL at 17:57

## 2019-11-06 RX ADMIN — NICOTINE POLACRILEX 8 MG: 4 GUM, CHEWING ORAL at 09:31

## 2019-11-06 RX ADMIN — GABAPENTIN 600 MG: 300 CAPSULE ORAL at 09:22

## 2019-11-06 RX ADMIN — OLANZAPINE 10 MG: 10 INJECTION, POWDER, LYOPHILIZED, FOR SOLUTION INTRAMUSCULAR at 13:18

## 2019-11-06 RX ADMIN — FEXOFENADINE HCL 60 MG: 60 TABLET, FILM COATED ORAL at 09:23

## 2019-11-06 RX ADMIN — NICOTINE POLACRILEX 8 MG: 4 GUM, CHEWING ORAL at 18:38

## 2019-11-06 RX ADMIN — LITHIUM CARBONATE 900 MG: 450 TABLET, EXTENDED RELEASE ORAL at 20:09

## 2019-11-06 ASSESSMENT — ACTIVITIES OF DAILY LIVING (ADL)
DRESS: STREET CLOTHES;INDEPENDENT
ORAL_HYGIENE: INDEPENDENT
HYGIENE/GROOMING: INDEPENDENT
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
## 2019-11-06 NOTE — PROGRESS NOTES
"Pt received multiple sessions of individual psychotherapy as additional support to expand window of tolerance during increased social and family stressors.  Pt describes voices as \"loud\" and a strong desire to use drugs to escape.  Pt describes her body as \"explosive\" and \"angry.\"  Pt encouraged to use coping skills and positive distractions.  She chose a prop-based interactive game.      Pt was also able to choose music for group dance/movement therapy (DMT) session as well as interact with peers to incorporate many musical choices.      Pt occasionally appeared dissociative but responded to therapist prompts.  Pt open to & responsive to all clinical interventions.       11/06/19 1100   Dance Movement Therapy   Type of Intervention 1:1 intervention   Response participates with encouragement   Hours 1     "

## 2019-11-06 NOTE — PROGRESS NOTES
Patient continues on 1 to 1 staff. Patient appears depressed and needs lots of encouragement. Voracious appetite. Patient attended all groups and participates. Continued SI. Fleeting thoughts. Monitoring.         11/06/19 1143   Behavioral Health   Hallucinations denies / not responding to hallucinations   Thinking intact   Orientation person: oriented;place: oriented;date: oriented;time: oriented   Memory baseline memory   Insight poor   Judgement impaired   Eye Contact at examiner   Affect blunted, flat;irritable;angry   Mood irritable;depressed;hopeless   Physical Appearance/Attire appears stated age;attire appropriate to age and situation;neat   Hygiene well groomed   1. Wish to be Dead (Recent) Yes   2. Non-Specific Active Suicidal Thoughts (Recent) Yes   Activity restless;withdrawn   Speech coherent;clear

## 2019-11-06 NOTE — PROVIDER NOTIFICATION
Pt appeared calm throughout shift until slamming  down after particular phone call.   Pt able to redirect to room and took prns.   Pt ended shift calm.   Pt denying urge to harm self while in hospital.

## 2019-11-06 NOTE — PROGRESS NOTES
JILLIAN SEE Incoming Telephone Call  For Supported Employment & Education    [facility] Enrollee: Ayana Prasad (1990)     MRN: [id number]  Incoming Call Received on: 11/4/19  Call Received from: Ayana ADLER's response to incoming call:   Ayana called this writer from the hospital for a check in on the communication that has been happening with her employer. This writer informed Ayana that I spoke with the  and that she was able to get an extension on her orientation due to hospitalization. Ayana reports that she has been feeling better since being in the hospital and that she is taking new medications that seem to be helping. She also reports being committed but that she is hoping to get some help with stable housing. She reports her  continues to work on her social security application and that her status is still pending.     Linh Hamilton
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
## 2019-11-07 LAB
ALBUMIN SERPL-MCNC: 3.8 G/DL (ref 3.4–5)
ALP SERPL-CCNC: 49 U/L (ref 40–150)
ALT SERPL W P-5'-P-CCNC: 36 U/L (ref 0–50)
ANION GAP SERPL CALCULATED.3IONS-SCNC: 8 MMOL/L (ref 3–14)
AST SERPL W P-5'-P-CCNC: 15 U/L (ref 0–45)
BILIRUB SERPL-MCNC: 0.3 MG/DL (ref 0.2–1.3)
BUN SERPL-MCNC: 18 MG/DL (ref 7–30)
CALCIUM SERPL-MCNC: 9.5 MG/DL (ref 8.5–10.1)
CHLORIDE SERPL-SCNC: 109 MMOL/L (ref 94–109)
CO2 SERPL-SCNC: 22 MMOL/L (ref 20–32)
CREAT SERPL-MCNC: 0.58 MG/DL (ref 0.52–1.04)
GFR SERPL CREATININE-BSD FRML MDRD: >90 ML/MIN/{1.73_M2}
GLUCOSE SERPL-MCNC: 104 MG/DL (ref 70–99)
POTASSIUM SERPL-SCNC: 3.9 MMOL/L (ref 3.4–5.3)
PROT SERPL-MCNC: 7.8 G/DL (ref 6.8–8.8)
SODIUM SERPL-SCNC: 139 MMOL/L (ref 133–144)

## 2019-11-07 PROCEDURE — 99231 SBSQ HOSP IP/OBS SF/LOW 25: CPT | Mod: GC | Performed by: PSYCHIATRY & NEUROLOGY

## 2019-11-07 PROCEDURE — 80053 COMPREHEN METABOLIC PANEL: CPT | Performed by: STUDENT IN AN ORGANIZED HEALTH CARE EDUCATION/TRAINING PROGRAM

## 2019-11-07 PROCEDURE — 25000132 ZZH RX MED GY IP 250 OP 250 PS 637: Performed by: STUDENT IN AN ORGANIZED HEALTH CARE EDUCATION/TRAINING PROGRAM

## 2019-11-07 PROCEDURE — 25000132 ZZH RX MED GY IP 250 OP 250 PS 637: Performed by: PSYCHIATRY & NEUROLOGY

## 2019-11-07 PROCEDURE — 36415 COLL VENOUS BLD VENIPUNCTURE: CPT | Performed by: STUDENT IN AN ORGANIZED HEALTH CARE EDUCATION/TRAINING PROGRAM

## 2019-11-07 PROCEDURE — 12400001 ZZH R&B MH UMMC

## 2019-11-07 PROCEDURE — G0177 OPPS/PHP; TRAIN & EDUC SERV: HCPCS

## 2019-11-07 RX ORDER — FLUPHENAZINE HYDROCHLORIDE 1 MG/1
2 TABLET ORAL AT BEDTIME
Status: DISCONTINUED | OUTPATIENT
Start: 2019-11-07 | End: 2019-11-11

## 2019-11-07 RX ORDER — LORAZEPAM 1 MG/1
1 TABLET ORAL 3 TIMES DAILY PRN
Status: DISCONTINUED | OUTPATIENT
Start: 2019-11-07 | End: 2019-11-11

## 2019-11-07 RX ORDER — TOPIRAMATE 50 MG/1
50 TABLET, FILM COATED ORAL EVERY EVENING
Status: DISCONTINUED | OUTPATIENT
Start: 2019-11-07 | End: 2019-11-12

## 2019-11-07 RX ORDER — MIRTAZAPINE 15 MG/1
15 TABLET, FILM COATED ORAL AT BEDTIME
Status: DISCONTINUED | OUTPATIENT
Start: 2019-11-07 | End: 2019-11-08

## 2019-11-07 RX ADMIN — GABAPENTIN 600 MG: 300 CAPSULE ORAL at 09:02

## 2019-11-07 RX ADMIN — GABAPENTIN 600 MG: 300 CAPSULE ORAL at 19:39

## 2019-11-07 RX ADMIN — OLANZAPINE 10 MG: 10 TABLET, ORALLY DISINTEGRATING ORAL at 09:17

## 2019-11-07 RX ADMIN — BENZTROPINE MESYLATE 1 MG: 1 TABLET ORAL at 12:11

## 2019-11-07 RX ADMIN — NICOTINE 1 PATCH: 14 PATCH, EXTENDED RELEASE TRANSDERMAL at 09:04

## 2019-11-07 RX ADMIN — MIRTAZAPINE 15 MG: 15 TABLET, FILM COATED ORAL at 20:56

## 2019-11-07 RX ADMIN — LITHIUM CARBONATE 900 MG: 450 TABLET, EXTENDED RELEASE ORAL at 19:39

## 2019-11-07 RX ADMIN — LORAZEPAM 1 MG: 1 TABLET ORAL at 17:19

## 2019-11-07 RX ADMIN — NICOTINE POLACRILEX 8 MG: 4 GUM, CHEWING ORAL at 18:32

## 2019-11-07 RX ADMIN — MAGNESIUM HYDROXIDE 30 ML: 400 SUSPENSION ORAL at 13:17

## 2019-11-07 RX ADMIN — LORAZEPAM 1 MG: 1 TABLET ORAL at 09:04

## 2019-11-07 RX ADMIN — QUETIAPINE FUMARATE 100 MG: 100 TABLET ORAL at 19:39

## 2019-11-07 RX ADMIN — NICOTINE POLACRILEX 8 MG: 4 GUM, CHEWING ORAL at 10:39

## 2019-11-07 RX ADMIN — NICOTINE POLACRILEX 8 MG: 4 GUM, CHEWING ORAL at 12:11

## 2019-11-07 RX ADMIN — FLUPHENAZINE HYDROCHLORIDE 1 MG: 1 TABLET, FILM COATED ORAL at 09:02

## 2019-11-07 RX ADMIN — GABAPENTIN 600 MG: 300 CAPSULE ORAL at 13:17

## 2019-11-07 RX ADMIN — FLUPHENAZINE HYDROCHLORIDE 2 MG: 1 TABLET, FILM COATED ORAL at 19:40

## 2019-11-07 RX ADMIN — NICOTINE POLACRILEX 8 MG: 4 GUM, CHEWING ORAL at 17:19

## 2019-11-07 RX ADMIN — LITHIUM CARBONATE 300 MG: 300 TABLET, EXTENDED RELEASE ORAL at 09:03

## 2019-11-07 RX ADMIN — POLYETHYLENE GLYCOL 3350 17 G: 17 POWDER, FOR SOLUTION ORAL at 18:33

## 2019-11-07 RX ADMIN — NICOTINE POLACRILEX 8 MG: 4 GUM, CHEWING ORAL at 13:17

## 2019-11-07 RX ADMIN — TOPIRAMATE 50 MG: 50 TABLET ORAL at 19:39

## 2019-11-07 RX ADMIN — FEXOFENADINE HCL 60 MG: 60 TABLET, FILM COATED ORAL at 09:04

## 2019-11-07 RX ADMIN — OLANZAPINE 10 MG: 10 TABLET, ORALLY DISINTEGRATING ORAL at 20:56

## 2019-11-07 RX ADMIN — LORAZEPAM 1 MG: 1 TABLET ORAL at 12:11

## 2019-11-07 ASSESSMENT — ACTIVITIES OF DAILY LIVING (ADL)
HYGIENE/GROOMING: INDEPENDENT
ORAL_HYGIENE: INDEPENDENT
DRESS: INDEPENDENT
LAUNDRY: WITH SUPERVISION
HYGIENE/GROOMING: INDEPENDENT
DRESS: INDEPENDENT
LAUNDRY: WITH SUPERVISION
ORAL_HYGIENE: INDEPENDENT

## 2019-11-07 NOTE — PROGRESS NOTES
Pt was intermittently present in milieu but was mostly isolative and socially withdrawn. Pt was observed laying/sleeping in room, eating dinner, listening to music, and reading. Pt presented with a blunt/flat to full range affect and remained calm throughout the day. Pt endorsed experiencing thoughts to commit suicide when out of hospital. Pt stated that she had a plan to do this while out of hospital but contracted for safety while in hospital. Pt did not expound on this plan. Pt also endorsed experiencing anxiety at level 10 out of 10 and stated she was experiencing shakiness, possibly as a result of medications.        11/06/19 2146   Behavioral Health   Hallucinations denies / not responding to hallucinations   Thinking   (appears intact)   Orientation person: oriented;place: oriented   Memory   (appears baseline)   Insight poor   Judgement impaired   Eye Contact at examiner   Affect blunted, flat;full range affect   Mood mood is calm   Physical Appearance/Attire attire appropriate to age and situation   Hygiene   (adequate)   Suicidality thoughts and plan;active   Self Injury plan;active   Elopement   (no apparent risk)   Activity isolative;withdrawn   Speech clear;coherent   Medication Sensitivity no stated side effects;no observed side effects   Psychomotor / Gait balanced;steady   Psycho Education   Type of Intervention 1:1 intervention   Response participates, initiates socially appropriate   Hours 0.5   Treatment Detail   (check-in)   Activities of Daily Living   Hygiene/Grooming independent   Oral Hygiene independent   Dress street clothes;independent   Laundry   (pt did not do laundry)   Room Organization independent   Activity   Activity Assistance Provided independent

## 2019-11-07 NOTE — PROGRESS NOTES
Re: [facility]     Request sent to [facility] to add patient to [facility] list.    Joana Marroquin, CINTHYA, ADRIANNAC

## 2019-11-07 NOTE — PLAN OF CARE
"Ayana continues to speak of suicide, but less intent and able to engage in conversation of some things she would like to change in her life, but unable to see path to achieve goals-states wife agrees to give her a second chance, \"But she said she doesn't love me anymore..She said she cares about me though, so she's willing to try again.\"-Ayana identified participating relationship and household as primary factors necessary for marriage to continue-states she feels this will be difficult in current living situation with mother-in-law-stated it would be good if she had a job, but she is unable to maintain employment for more than a couple months-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\", but after a couple months her boss told her she needed to obtain psychological help-did give specifics, but seemed to know why boss might say this-spoke of trying to obtain social security disability, but being unsuccessful so far-had mentioned before that mother-in-law tells her she needs to get out and get a job-Ayana passively participated in discussion of options to work on improving outlook and considering ways she can be supportive of others in her family [place] to help her feel more emotionally engaged-seemed more open to changes in her patterns and possibility she has ability to choose healthy interactions that may lead to more fulfilling and happy outcome-able to consider periods of time she felt happy and that she may achieve this again-Ayana remains on SIO  "

## 2019-11-08 PROCEDURE — 99231 SBSQ HOSP IP/OBS SF/LOW 25: CPT | Mod: GC | Performed by: PSYCHIATRY & NEUROLOGY

## 2019-11-08 PROCEDURE — 12400001 ZZH R&B MH UMMC

## 2019-11-08 PROCEDURE — 25000132 ZZH RX MED GY IP 250 OP 250 PS 637: Performed by: PSYCHIATRY & NEUROLOGY

## 2019-11-08 PROCEDURE — 25000132 ZZH RX MED GY IP 250 OP 250 PS 637: Performed by: STUDENT IN AN ORGANIZED HEALTH CARE EDUCATION/TRAINING PROGRAM

## 2019-11-08 RX ORDER — TRAZODONE HYDROCHLORIDE 50 MG/1
50 TABLET, FILM COATED ORAL
Status: DISCONTINUED | OUTPATIENT
Start: 2019-11-08 | End: 2019-11-12

## 2019-11-08 RX ORDER — MIRTAZAPINE 15 MG/1
15 TABLET, FILM COATED ORAL AT BEDTIME
Status: COMPLETED | OUTPATIENT
Start: 2019-11-08 | End: 2019-11-09

## 2019-11-08 RX ORDER — POTASSIUM CHLORIDE 750 MG/1
10 TABLET, EXTENDED RELEASE ORAL DAILY
Status: COMPLETED | OUTPATIENT
Start: 2019-11-08 | End: 2019-11-12

## 2019-11-08 RX ORDER — POTASSIUM CITRATE AND CITRIC ACID MONOHYDRATE 1100; 334 MG/5ML; MG/5ML
10 SOLUTION ORAL DAILY
Status: DISCONTINUED | OUTPATIENT
Start: 2019-11-08 | End: 2019-11-08

## 2019-11-08 RX ORDER — MIRTAZAPINE 30 MG/1
30 TABLET, FILM COATED ORAL AT BEDTIME
Status: DISCONTINUED | OUTPATIENT
Start: 2019-11-10 | End: 2019-12-17 | Stop reason: HOSPADM

## 2019-11-08 RX ADMIN — QUETIAPINE FUMARATE 100 MG: 100 TABLET ORAL at 18:15

## 2019-11-08 RX ADMIN — FLUPHENAZINE HYDROCHLORIDE 2 MG: 1 TABLET, FILM COATED ORAL at 20:30

## 2019-11-08 RX ADMIN — OLANZAPINE 10 MG: 10 TABLET, ORALLY DISINTEGRATING ORAL at 13:06

## 2019-11-08 RX ADMIN — NICOTINE 1 PATCH: 14 PATCH, EXTENDED RELEASE TRANSDERMAL at 09:52

## 2019-11-08 RX ADMIN — GABAPENTIN 600 MG: 300 CAPSULE ORAL at 09:51

## 2019-11-08 RX ADMIN — LORAZEPAM 1 MG: 1 TABLET ORAL at 17:22

## 2019-11-08 RX ADMIN — LITHIUM CARBONATE 300 MG: 300 TABLET, EXTENDED RELEASE ORAL at 09:52

## 2019-11-08 RX ADMIN — LITHIUM CARBONATE 900 MG: 450 TABLET, EXTENDED RELEASE ORAL at 20:30

## 2019-11-08 RX ADMIN — LORAZEPAM 1 MG: 1 TABLET ORAL at 09:52

## 2019-11-08 RX ADMIN — NICOTINE POLACRILEX 8 MG: 4 GUM, CHEWING ORAL at 12:34

## 2019-11-08 RX ADMIN — POTASSIUM CHLORIDE 10 MEQ: 10 TABLET, EXTENDED RELEASE ORAL at 15:43

## 2019-11-08 RX ADMIN — FEXOFENADINE HCL 60 MG: 60 TABLET, FILM COATED ORAL at 09:51

## 2019-11-08 RX ADMIN — FLUPHENAZINE HYDROCHLORIDE 1 MG: 1 TABLET, FILM COATED ORAL at 09:51

## 2019-11-08 RX ADMIN — OLANZAPINE 10 MG: 10 TABLET, ORALLY DISINTEGRATING ORAL at 17:39

## 2019-11-08 RX ADMIN — NICOTINE POLACRILEX 8 MG: 4 GUM, CHEWING ORAL at 18:15

## 2019-11-08 RX ADMIN — LORAZEPAM 1 MG: 1 TABLET ORAL at 13:25

## 2019-11-08 RX ADMIN — TOPIRAMATE 50 MG: 50 TABLET ORAL at 20:30

## 2019-11-08 RX ADMIN — GABAPENTIN 600 MG: 300 CAPSULE ORAL at 20:30

## 2019-11-08 RX ADMIN — NICOTINE POLACRILEX 8 MG: 4 GUM, CHEWING ORAL at 09:53

## 2019-11-08 RX ADMIN — GABAPENTIN 600 MG: 300 CAPSULE ORAL at 14:26

## 2019-11-08 ASSESSMENT — ACTIVITIES OF DAILY LIVING (ADL)
DRESS: INDEPENDENT
HYGIENE/GROOMING: INDEPENDENT
ORAL_HYGIENE: INDEPENDENT

## 2019-11-08 NOTE — PROGRESS NOTES
[facility] SEE Progress Note   For Supported Employment & Education    [facility] Enrollee: Ayana Prasad (1990)     MRN: [id number]  Date:  10/22/19  Clinician: JILLIAN Supported Employment & , Linh Hamilton    1. Client Status Update:   Ayana Prasad is interested in education (Client continuing a class or school program) and employment (Client started competitive employment position)    2. People present:   SEE/Writer  Client: Ayana Prasad  JILLIAN Director/Family Clinian, Linh Howell MSW, LISCW, Nurse: Estefania Armijo RN    3. Length of Actual Contact: 360 minutes   Traveled? Yes  Total Travel Time: 25 minutes    4. Location of contact:  [facility], [place], Other: Ambulance, [facility]    5. Brief description of session, contact, or client status (include: strategies, interventions, client reaction to contact, next steps, etc)    Ayana and this writer met for a follow up SEE session. During the appointment, Ayana reported that she felt she needed to go to the hospital for suicidal ideation. Ayana and this writer had EMS escort us to the ambulance where we were transported to [facility]. This writer sat with Ayana while she went through the intake process. Writer consulted with [facility] team and will work with team to coordinate care after hospital discharge. Please see notes from JULISA Ring dated 10/22 for further details.    6. Completion of mutually agreed upon client task from previous meeting:  Not Applicable    7. Orientation and Treatment Planning:  Pursuing current SEE goals    8. Assessment:  Assessing client's need for follow-along supports    9. Placement:  Not Applicable    10. Follow Along Supports: (for clients who are working or attending school)   Not Applicable    11. Mutually agreed upon client task for next meeting:     na    12. Next Meeting Scheduled for: miah [facility] Supported Employment &
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
## 2019-11-08 NOTE — PROGRESS NOTES
"   11/07/19 1700   Group Therapy Session   Group Attendance   (no charge)   Total Time (minutes) 15   Group Type psychotherapeutic   Group Topic Covered other (see comments)   Patient Participation/Contribution left the group on several occasions   CBT activity was utilized to help individuals reduce worry. Individuals were asked to identify a worry, consider worse consequence, and successful coping strategies used in the past.   Ayana presented with a flat mood and affect. She reported feeling \"good;\" however, she did not remain in group for long. She wrote one comment on the activity sheet under \"what gives you hope?\" Ayana wrote, \"The possibility I might be happy one day.\"  After this, she left group again and did not return.   "

## 2019-11-08 NOTE — PLAN OF CARE
"\"I have so much going on in my head\". Depression is at a 9 and anxiety is at a 10. States has auditory hallucinations all the time and the voices are mean. C/o \"a little paranoia\". Thoughts are more clear. Attends 1/4 of the groups. Does not socialize with peers. Continues to have suicidal thoughts and would act out on them. Was medicated with Ativan twice and Zyprexa once but continued to have extreme anxiety. Writer worked with patient on paced breathing with an ice pack for 15 minutes. Patient said it helped relieve some of the anxiety  "

## 2019-11-09 PROCEDURE — 25000132 ZZH RX MED GY IP 250 OP 250 PS 637: Performed by: STUDENT IN AN ORGANIZED HEALTH CARE EDUCATION/TRAINING PROGRAM

## 2019-11-09 PROCEDURE — 25000132 ZZH RX MED GY IP 250 OP 250 PS 637: Performed by: PSYCHIATRY & NEUROLOGY

## 2019-11-09 PROCEDURE — 12400001 ZZH R&B MH UMMC

## 2019-11-09 RX ADMIN — OLANZAPINE 10 MG: 10 TABLET, ORALLY DISINTEGRATING ORAL at 09:38

## 2019-11-09 RX ADMIN — GABAPENTIN 600 MG: 300 CAPSULE ORAL at 09:33

## 2019-11-09 RX ADMIN — NICOTINE POLACRILEX 8 MG: 4 GUM, CHEWING ORAL at 11:06

## 2019-11-09 RX ADMIN — QUETIAPINE FUMARATE 100 MG: 100 TABLET ORAL at 19:19

## 2019-11-09 RX ADMIN — NICOTINE 1 PATCH: 14 PATCH, EXTENDED RELEASE TRANSDERMAL at 09:36

## 2019-11-09 RX ADMIN — FEXOFENADINE HCL 60 MG: 60 TABLET, FILM COATED ORAL at 09:40

## 2019-11-09 RX ADMIN — BENZTROPINE MESYLATE 1 MG: 1 TABLET ORAL at 15:48

## 2019-11-09 RX ADMIN — TOPIRAMATE 50 MG: 50 TABLET ORAL at 19:19

## 2019-11-09 RX ADMIN — TRAZODONE HYDROCHLORIDE 50 MG: 50 TABLET ORAL at 21:11

## 2019-11-09 RX ADMIN — LORAZEPAM 1 MG: 1 TABLET ORAL at 17:27

## 2019-11-09 RX ADMIN — LORAZEPAM 1 MG: 1 TABLET ORAL at 09:37

## 2019-11-09 RX ADMIN — FLUPHENAZINE HYDROCHLORIDE 1 MG: 1 TABLET, FILM COATED ORAL at 09:33

## 2019-11-09 RX ADMIN — LITHIUM CARBONATE 900 MG: 450 TABLET, EXTENDED RELEASE ORAL at 19:19

## 2019-11-09 RX ADMIN — NICOTINE POLACRILEX 8 MG: 4 GUM, CHEWING ORAL at 14:58

## 2019-11-09 RX ADMIN — MAGNESIUM HYDROXIDE 30 ML: 400 SUSPENSION ORAL at 13:20

## 2019-11-09 RX ADMIN — LITHIUM CARBONATE 300 MG: 300 TABLET, EXTENDED RELEASE ORAL at 09:33

## 2019-11-09 RX ADMIN — LORAZEPAM 1 MG: 1 TABLET ORAL at 13:20

## 2019-11-09 RX ADMIN — NICOTINE POLACRILEX 8 MG: 4 GUM, CHEWING ORAL at 18:45

## 2019-11-09 RX ADMIN — GABAPENTIN 600 MG: 300 CAPSULE ORAL at 13:21

## 2019-11-09 RX ADMIN — MIRTAZAPINE 15 MG: 15 TABLET, FILM COATED ORAL at 19:20

## 2019-11-09 RX ADMIN — POTASSIUM CHLORIDE 10 MEQ: 10 TABLET, EXTENDED RELEASE ORAL at 09:33

## 2019-11-09 RX ADMIN — MIRTAZAPINE 15 MG: 15 TABLET, FILM COATED ORAL at 00:52

## 2019-11-09 RX ADMIN — GABAPENTIN 600 MG: 300 CAPSULE ORAL at 19:19

## 2019-11-09 RX ADMIN — OLANZAPINE 10 MG: 10 TABLET, ORALLY DISINTEGRATING ORAL at 18:45

## 2019-11-09 RX ADMIN — FLUPHENAZINE HYDROCHLORIDE 2 MG: 1 TABLET, FILM COATED ORAL at 19:19

## 2019-11-09 ASSESSMENT — ACTIVITIES OF DAILY LIVING (ADL)
LAUNDRY: WITH SUPERVISION
ORAL_HYGIENE: INDEPENDENT
HYGIENE/GROOMING: INDEPENDENT
DRESS: INDEPENDENT
HYGIENE/GROOMING: INDEPENDENT
DRESS: INDEPENDENT
ORAL_HYGIENE: INDEPENDENT
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
## 2019-11-09 NOTE — PROGRESS NOTES
11/08/19 2100   Behavioral Health   Hallucinations appears responding   Thinking distractable;paranoid;poor concentration   Orientation person: oriented;place: oriented;date: oriented   Memory baseline memory   Insight poor   Judgement impaired   Eye Contact into space;at examiner   Affect blunted, flat   Mood depressed;anxious;irritable   Physical Appearance/Attire untidy;disheveled   Hygiene neglected grooming - unclean body, hair, teeth;body odor   Suicidality thoughts and plan  (plan to overdose after discharge)   1. Wish to be Dead (Recent) Yes   Self Injury thoughts only   Elopement   (none stated or observed)   Activity isolative;withdrawn   Speech clear;coherent   Medication Sensitivity no stated side effects;no observed side effects   Psychomotor / Gait balanced;steady     Pt was isolative and withdrawn. Pt. Went to group briefly then left. Pt was anxious, depressed, sad and hopeless. She endorsed auditory hallucinations. Pt. Endorsed passive suicidal ideation. She contracted for safety while she's on the unit but reported that she knows she won't be able to keep herself safe when she leaves the  hospital. She reports that she has a plan to kill herself by overdosing on her medications. Pt. Rates both anxiety and depression at (9/10). She reports that Zyprexa makes her restless and does not want to take it at night. Patient is requesting another sleeping medication. She reports having trauma on her hands and states that she might be having a side affect from a medication.

## 2019-11-09 NOTE — PROGRESS NOTES
"Pt presented with calm affect, organized thought process, and appropriate behavior. Pt discussed feelings of confusion, overwhelm, and depression with writer throughout shift regarding current standing with her wife. Pt noted that indecision from both parties had led to acute stress during the week and further mood dysregulation. She reported additional PRNs and supportive communication with staff was helpful in maintaining safe behavior and using coping skills. Pt spent time coloring and completed decision making worksheet which she expressed was \"helpful in thinking more clearly.\" She continues to report passive suicidal thoughts but contracts for safety. No psychotic symptoms were reported or observed.          11/09/19 1400   Behavioral Health   Hallucinations denies / not responding to hallucinations   Thinking distractable   Orientation person: oriented;place: oriented;date: oriented;time: oriented   Memory baseline memory   Insight poor   Judgement impaired   Eye Contact at examiner   Affect full range affect   Mood anxious;mood is calm   Physical Appearance/Attire appears stated age;attire appropriate to age and situation   Hygiene neglected grooming - unclean body, hair, teeth   1. Wish to be Dead (Recent) Yes   Wish to be Dead Description (Recent)   (Passive thoughts of being \"Better off dead\")   2. Non-Specific Active Suicidal Thoughts (Recent) Yes   Non-Specific Active Suicidal Thought Description (Recent) Passive thoughts, no plan, contracts for safety   Psycho Education   Type of Intervention 1:1 intervention   Response participates, initiates socially appropriate   Hours 0.5   Treatment Detail Decision-making discussion   Activities of Daily Living   Hygiene/Grooming independent   Oral Hygiene independent   Dress independent   Laundry with supervision   Room Organization independent     "

## 2019-11-10 PROCEDURE — 25000132 ZZH RX MED GY IP 250 OP 250 PS 637: Performed by: PSYCHIATRY & NEUROLOGY

## 2019-11-10 PROCEDURE — 25000132 ZZH RX MED GY IP 250 OP 250 PS 637: Performed by: STUDENT IN AN ORGANIZED HEALTH CARE EDUCATION/TRAINING PROGRAM

## 2019-11-10 PROCEDURE — 12400001 ZZH R&B MH UMMC

## 2019-11-10 PROCEDURE — H2032 ACTIVITY THERAPY, PER 15 MIN: HCPCS

## 2019-11-10 RX ORDER — ACETAMINOPHEN 325 MG/1
650 TABLET ORAL EVERY 4 HOURS PRN
Status: DISCONTINUED | OUTPATIENT
Start: 2019-11-10 | End: 2019-11-26

## 2019-11-10 RX ADMIN — GABAPENTIN 600 MG: 300 CAPSULE ORAL at 14:52

## 2019-11-10 RX ADMIN — NICOTINE POLACRILEX 8 MG: 4 GUM, CHEWING ORAL at 17:09

## 2019-11-10 RX ADMIN — QUETIAPINE FUMARATE 100 MG: 100 TABLET ORAL at 20:23

## 2019-11-10 RX ADMIN — FLUPHENAZINE HYDROCHLORIDE 2 MG: 1 TABLET, FILM COATED ORAL at 20:23

## 2019-11-10 RX ADMIN — BENZTROPINE MESYLATE 1 MG: 1 TABLET ORAL at 03:16

## 2019-11-10 RX ADMIN — LORAZEPAM 1 MG: 1 TABLET ORAL at 17:38

## 2019-11-10 RX ADMIN — NICOTINE 1 PATCH: 14 PATCH, EXTENDED RELEASE TRANSDERMAL at 10:54

## 2019-11-10 RX ADMIN — NICOTINE POLACRILEX 8 MG: 4 GUM, CHEWING ORAL at 11:12

## 2019-11-10 RX ADMIN — GABAPENTIN 600 MG: 300 CAPSULE ORAL at 20:23

## 2019-11-10 RX ADMIN — GABAPENTIN 600 MG: 300 CAPSULE ORAL at 10:54

## 2019-11-10 RX ADMIN — NICOTINE POLACRILEX 8 MG: 4 GUM, CHEWING ORAL at 18:31

## 2019-11-10 RX ADMIN — NICOTINE POLACRILEX 8 MG: 4 GUM, CHEWING ORAL at 19:57

## 2019-11-10 RX ADMIN — POTASSIUM CHLORIDE 10 MEQ: 10 TABLET, EXTENDED RELEASE ORAL at 10:54

## 2019-11-10 RX ADMIN — FEXOFENADINE HCL 60 MG: 60 TABLET, FILM COATED ORAL at 10:54

## 2019-11-10 RX ADMIN — LITHIUM CARBONATE 900 MG: 450 TABLET, EXTENDED RELEASE ORAL at 20:24

## 2019-11-10 RX ADMIN — LITHIUM CARBONATE 300 MG: 300 TABLET, EXTENDED RELEASE ORAL at 10:53

## 2019-11-10 RX ADMIN — TOPIRAMATE 50 MG: 50 TABLET ORAL at 20:23

## 2019-11-10 RX ADMIN — FLUPHENAZINE HYDROCHLORIDE 1 MG: 1 TABLET, FILM COATED ORAL at 10:53

## 2019-11-10 RX ADMIN — LORAZEPAM 1 MG: 1 TABLET ORAL at 19:57

## 2019-11-10 ASSESSMENT — ACTIVITIES OF DAILY LIVING (ADL)
LAUNDRY: WITH SUPERVISION
ORAL_HYGIENE: INDEPENDENT
ORAL_HYGIENE: INDEPENDENT
HYGIENE/GROOMING: INDEPENDENT
HYGIENE/GROOMING: INDEPENDENT
DRESS: STREET CLOTHES;INDEPENDENT
DRESS: INDEPENDENT

## 2019-11-10 NOTE — PROGRESS NOTES
"Pt was observed to spend the majority of the evening in the OT room reading their book, listening to music, and walking the song briefly. Mid evening pt called their mother, who began talking to them about their wife. Pt was observed to become physically anxious. Pt was overheard saying on the phone \"Ok I'm going to set a boundary with you, I don't want to talk about Sergio with you on the phone because it makes me super anxious\". After the call with their mother pt called their wife. Pt was observed to become anxious as they were overheard talking about divorce. During check-in with writer pt expressed feeling anxiety about their situation with their wife as well as anxiety about being in the hospital for so long. Pt is aware that they may not be able to discharge to their home due to the unstable nature of their relationship with their wife. Pt then expressed concern about the possibility of going to an IRTS facility stating \"the last time I went to an IRTS it was filled with drugs\" \"a drug dealer lived in the house next door\" \"I'm clean but I don't want to be around that because I don't think I would stay away from it\". Pt did report still feeling suicidal, as well as hearing voices that are telling them to hurt themselves.      11/09/19 2106   Behavioral Health   Hallucinations denies / not responding to hallucinations   Thinking distractable   Orientation person: oriented;place: oriented;time: oriented;date: oriented   Memory baseline memory   Insight poor   Judgement impaired   Eye Contact at examiner   Affect full range affect   Mood anxious;mood is calm   Physical Appearance/Attire attire appropriate to age and situation   Hygiene neglected grooming - unclean body, hair, teeth   Suicidality thoughts only   1. Wish to be Dead (Recent) Yes   2. Non-Specific Active Suicidal Thoughts (Recent) Yes   Self Injury thoughts only   Elopement   (None stated or observed)   Activity restless   Speech clear;coherent "   Medication Sensitivity   (Facial numbness )   Psychomotor / Gait balanced;steady   Psycho Education   Type of Intervention 1:1 intervention   Response participates, initiates socially appropriate   Hours 0.5   Treatment Detail Check-in   Activities of Daily Living   Hygiene/Grooming independent   Oral Hygiene independent   Dress independent   Room Organization independent

## 2019-11-11 PROCEDURE — H2032 ACTIVITY THERAPY, PER 15 MIN: HCPCS

## 2019-11-11 PROCEDURE — 90832 PSYTX W PT 30 MINUTES: CPT

## 2019-11-11 PROCEDURE — 12400001 ZZH R&B MH UMMC

## 2019-11-11 PROCEDURE — 25000132 ZZH RX MED GY IP 250 OP 250 PS 637: Performed by: STUDENT IN AN ORGANIZED HEALTH CARE EDUCATION/TRAINING PROGRAM

## 2019-11-11 PROCEDURE — 99232 SBSQ HOSP IP/OBS MODERATE 35: CPT | Mod: GC | Performed by: PSYCHIATRY & NEUROLOGY

## 2019-11-11 PROCEDURE — 25000132 ZZH RX MED GY IP 250 OP 250 PS 637: Performed by: PSYCHIATRY & NEUROLOGY

## 2019-11-11 RX ORDER — GUANFACINE 1 MG/1
1 TABLET ORAL 2 TIMES DAILY
Status: DISCONTINUED | OUTPATIENT
Start: 2019-11-11 | End: 2019-11-15

## 2019-11-11 RX ORDER — LORAZEPAM 1 MG/1
1 TABLET ORAL 2 TIMES DAILY
Status: DISCONTINUED | OUTPATIENT
Start: 2019-11-11 | End: 2019-11-12

## 2019-11-11 RX ORDER — FLUPHENAZINE HYDROCHLORIDE 1 MG/1
1 TABLET ORAL AT BEDTIME
Status: DISCONTINUED | OUTPATIENT
Start: 2019-11-11 | End: 2019-11-12

## 2019-11-11 RX ORDER — GUANFACINE 1 MG/1
1 TABLET ORAL AT BEDTIME
Status: DISCONTINUED | OUTPATIENT
Start: 2019-11-11 | End: 2019-11-11

## 2019-11-11 RX ADMIN — MIRTAZAPINE 30 MG: 30 TABLET, FILM COATED ORAL at 22:43

## 2019-11-11 RX ADMIN — GABAPENTIN 600 MG: 300 CAPSULE ORAL at 13:17

## 2019-11-11 RX ADMIN — LITHIUM CARBONATE 900 MG: 450 TABLET, EXTENDED RELEASE ORAL at 22:44

## 2019-11-11 RX ADMIN — POLYETHYLENE GLYCOL 3350 17 G: 17 POWDER, FOR SOLUTION ORAL at 11:05

## 2019-11-11 RX ADMIN — NICOTINE 1 PATCH: 14 PATCH, EXTENDED RELEASE TRANSDERMAL at 10:54

## 2019-11-11 RX ADMIN — FLUPHENAZINE HYDROCHLORIDE 1 MG: 1 TABLET, FILM COATED ORAL at 22:43

## 2019-11-11 RX ADMIN — LORAZEPAM 1 MG: 1 TABLET ORAL at 22:43

## 2019-11-11 RX ADMIN — GABAPENTIN 600 MG: 300 CAPSULE ORAL at 10:50

## 2019-11-11 RX ADMIN — NICOTINE POLACRILEX 8 MG: 4 GUM, CHEWING ORAL at 17:44

## 2019-11-11 RX ADMIN — POTASSIUM CHLORIDE 10 MEQ: 10 TABLET, EXTENDED RELEASE ORAL at 10:52

## 2019-11-11 RX ADMIN — LORAZEPAM 1 MG: 1 TABLET ORAL at 12:54

## 2019-11-11 RX ADMIN — QUETIAPINE FUMARATE 100 MG: 100 TABLET ORAL at 22:43

## 2019-11-11 RX ADMIN — NICOTINE POLACRILEX 8 MG: 4 GUM, CHEWING ORAL at 14:31

## 2019-11-11 RX ADMIN — FLUPHENAZINE HYDROCHLORIDE 1 MG: 1 TABLET, FILM COATED ORAL at 10:50

## 2019-11-11 RX ADMIN — GUANFACINE 1 MG: 1 TABLET ORAL at 16:33

## 2019-11-11 RX ADMIN — GABAPENTIN 600 MG: 300 CAPSULE ORAL at 22:43

## 2019-11-11 RX ADMIN — LITHIUM CARBONATE 300 MG: 300 TABLET, EXTENDED RELEASE ORAL at 10:53

## 2019-11-11 RX ADMIN — TOPIRAMATE 50 MG: 50 TABLET ORAL at 22:44

## 2019-11-11 RX ADMIN — FEXOFENADINE HCL 60 MG: 60 TABLET, FILM COATED ORAL at 10:52

## 2019-11-11 RX ADMIN — ALUMINUM HYDROXIDE, MAGNESIUM HYDROXIDE, AND DIMETHICONE 30 ML: 400; 400; 40 SUSPENSION ORAL at 17:54

## 2019-11-11 RX ADMIN — LORAZEPAM 1 MG: 1 TABLET ORAL at 15:39

## 2019-11-11 RX ADMIN — NICOTINE POLACRILEX 8 MG: 4 GUM, CHEWING ORAL at 12:54

## 2019-11-11 RX ADMIN — NICOTINE POLACRILEX 8 MG: 4 GUM, CHEWING ORAL at 16:38

## 2019-11-11 ASSESSMENT — ACTIVITIES OF DAILY LIVING (ADL)
DRESS: STREET CLOTHES;INDEPENDENT
ORAL_HYGIENE: INDEPENDENT
LAUNDRY: WITH SUPERVISION
DRESS: INDEPENDENT
HYGIENE/GROOMING: INDEPENDENT
ORAL_HYGIENE: INDEPENDENT
HYGIENE/GROOMING: INDEPENDENT

## 2019-11-11 NOTE — PLAN OF CARE
Patient out in milieu majority of shift, displaying full range of affect and remaining calm.   Pt made conscious effort to avoid prn's for anxiety, concentrating on keeping more positive attitude; but,  pt did make a couple requests.  Pt rated depression 3 out of 10 tonight.
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
## 2019-11-11 NOTE — PLAN OF CARE
BEHAVIORAL TEAM DISCUSSION    Participants:   Attending: Dr. Joao Morin  Resident:  Dr. Sunni Clark  Student: Kayla MORALES  RN: Vangie  CTC: Tierney Ríos  Progress: Pt slept alright and has been medication adherent.  Team continues to adjust medication and additional stabilization needed.  Anticipated length of stay: Unknown, pending stabilization and appropriate disposition plan.   Continued Stay Criteria/Rationale: Assessment, treatment, and stabilization.   Medical/Physical: None discussed.   Precautions:   Behavioral Orders   Procedures     Code 1 - Restrict to Unit     Elopement precautions     Routine Programming     As clinically indicated     Self Injury Precaution     Status 15     Every 15 minutes.     Status Individual Observation     Okay to use bathroom with door closed (please knock if longer then 7 minutes)     Order Specific Question:   CONTINUOUS 24 hours / day     Answer:   Other     Order Specific Question:   Specify distance     Answer:   15 feet     Order Specific Question:   Indications for SIO     Answer:   Self-injury risk     Order Specific Question:   Indications for SIO     Answer:   Suicide risk     Order Specific Question:   Indications for SIO     Answer:   Medical equipment / ligature risk     Suicide precautions     Patients on Suicide Precautions should have a Combination Diet ordered that includes a Diet selection(s) AND a Behavioral Tray selection for Safe Tray - with utensils, or Safe Tray - NO utensils       Plan: Team is making medication adjustments, discussed disposition options that will provide appropriate support, including IRTS and DBT.  Pt is not interested in IRTS program. Plan to put in an order for individual therapy.  Rationale for change in precautions or plan:  None discussed.

## 2019-11-11 NOTE — PROGRESS NOTES
" 11/11/19 2006   Art Therapy   Type of Intervention structured groups   Response participates with encouragement    Hours 1.5   Treatment Detail    Drawing/ tempera paint/ DBt house      Problem- Principal psychiatric diagnosis: Principal psychiatric diagnosis:   # Schizoaffective disorder, bipolar type, current episode depressive  # r/o Substance-induce mood and psychotic disorder     Secondary psychiatric diagnoses:   # PTSD  # Borderline personality disorder  # Polysubstance abuse  # ADHD       Goal- process, express, cope and regulate feelings and emotions through Art Therapy directives.     Outcome- pt was in and out of group. She noted she was having difficulty focusing. She did engage on and off with her scratch art project. Writer and she had a good 1:1 Therapy discussion while she worked on her scratch art. She was conversational and able to listen. Writer validated that relationship issues \"suck\" and that the one month deadline her partner put on her to get better felt like a lot of pressure.  Writer and she discussed some DBT skills that could be helpful for emotional regulation, self care/ mindfulness and radical acceptance, no matter the outcome of the relationship. She said she met her partner on station 30, and she has mental health issues as well. She did mention that partner and Writer had the same name. She said her family was supportive. We talked about focusing this evening on enjoying the musical talents peers on the unit have. She said she \" loved\" the [employer] song being played.  We talked about opposite action. If the phone call didn't go well with partner, what to do instead of throwing a chair. She talked about solutions such as talking to staff and listening to the music , reading or watching a movie. She said art also helped and she could do some coloring.       "

## 2019-11-11 NOTE — PROGRESS NOTES
Pt was in the milieu this evening and the OT. Pt presented with a full range affect and a calm mood. Pt told writer that she was proud of almost meeting her goal today of not getting any PRNs. Pt said she was feeling really anxious around 5:30 during art therapy. Pt left group and used positive coping skills to try to lower her anxiety. When those did not work (talking it out and walking in the song), pt asked for a PRN medication to help lower her anxiety more. Pt was proud she did not take one earlier in the day. Pt also reported her new game plan for treatment that included focusing on herself and making herself better. She stated there is a part of her that wants to live and had a positive outlook on the future. Pt said she is having some thoughts, though not many, about SI. Pt said her depression is a 3 on a 10 scale (1 being none and 10 being the worst) and her anxiety was a 10 on the same scale. Pt denies all HI and other mental health symptoms.        11/10/19 0175   Behavioral Health   Hallucinations denies / not responding to hallucinations   Thinking distractable   Orientation person: oriented;place: oriented;date: oriented;time: oriented   Memory baseline memory   Insight poor   Judgement impaired   Eye Contact at examiner   Affect full range affect   Mood anxious;mood is calm   Physical Appearance/Attire attire appropriate to age and situation   Hygiene other (see comment)  (Pt did not shower this shift. )   Suicidality thoughts only  (Pt said she thought about it 'a little')   Psycho Education   Type of Intervention 1:1 intervention   Response participates, initiates socially appropriate   Hours 0.5   Treatment Detail Check-In   Activities of Daily Living   Hygiene/Grooming independent   Oral Hygiene independent   Dress independent   Laundry with supervision   Room Organization independent

## 2019-11-11 NOTE — PLAN OF CARE
Ayana continues on SIO for continued auditory hallucinations telling her to kill self and continued thoughts to kill self-states she has a plan, but does not want to say what it is-Ayana attended some of grps today-mood varies states she is anxious about phone session with team and Sergio tomorrow-encouraged Ayana states she is struggling with angry feelings towards Sergio and explodes when she talks to her-angry re Sergio's expectations of change for Ayana-encouraged Ayana to consider what she wants for her life and to plan for constructive ways to manage difficult emotions prior to situations where they arise in order to allow Ayana to feel she has more control and to achieve more positive outcome for herself-Ayana expressing concern she will react and be unable to control response-will continue to work with Ayana on developing constructive responses-Ayana continues very verbal with 1:1  staff

## 2019-11-12 PROCEDURE — 25000132 ZZH RX MED GY IP 250 OP 250 PS 637: Performed by: PSYCHIATRY & NEUROLOGY

## 2019-11-12 PROCEDURE — 12400001 ZZH R&B MH UMMC

## 2019-11-12 PROCEDURE — 25000132 ZZH RX MED GY IP 250 OP 250 PS 637: Performed by: STUDENT IN AN ORGANIZED HEALTH CARE EDUCATION/TRAINING PROGRAM

## 2019-11-12 PROCEDURE — 99232 SBSQ HOSP IP/OBS MODERATE 35: CPT | Mod: GC | Performed by: PSYCHIATRY & NEUROLOGY

## 2019-11-12 RX ORDER — LORAZEPAM 1 MG/1
1 TABLET ORAL 2 TIMES DAILY PRN
Status: DISCONTINUED | OUTPATIENT
Start: 2019-11-12 | End: 2019-11-13

## 2019-11-12 RX ORDER — FLUPHENAZINE HYDROCHLORIDE 1 MG/1
1 TABLET ORAL EVERY MORNING
Status: COMPLETED | OUTPATIENT
Start: 2019-11-13 | End: 2019-11-13

## 2019-11-12 RX ORDER — LORAZEPAM 1 MG/1
1 TABLET ORAL 3 TIMES DAILY PRN
Status: DISCONTINUED | OUTPATIENT
Start: 2019-11-12 | End: 2019-11-12

## 2019-11-12 RX ADMIN — MIRTAZAPINE 30 MG: 30 TABLET, FILM COATED ORAL at 20:42

## 2019-11-12 RX ADMIN — GUANFACINE 1 MG: 1 TABLET ORAL at 16:57

## 2019-11-12 RX ADMIN — GUANFACINE 1 MG: 1 TABLET ORAL at 09:57

## 2019-11-12 RX ADMIN — OLANZAPINE 10 MG: 10 TABLET, ORALLY DISINTEGRATING ORAL at 17:39

## 2019-11-12 RX ADMIN — LORAZEPAM 1 MG: 1 TABLET ORAL at 09:57

## 2019-11-12 RX ADMIN — FEXOFENADINE HCL 60 MG: 60 TABLET, FILM COATED ORAL at 09:56

## 2019-11-12 RX ADMIN — LITHIUM CARBONATE 900 MG: 450 TABLET, EXTENDED RELEASE ORAL at 20:42

## 2019-11-12 RX ADMIN — LITHIUM CARBONATE 300 MG: 300 TABLET, EXTENDED RELEASE ORAL at 09:57

## 2019-11-12 RX ADMIN — POTASSIUM CHLORIDE 10 MEQ: 10 TABLET, EXTENDED RELEASE ORAL at 09:58

## 2019-11-12 RX ADMIN — LORAZEPAM 1 MG: 1 TABLET ORAL at 19:30

## 2019-11-12 RX ADMIN — NICOTINE POLACRILEX 8 MG: 4 GUM, CHEWING ORAL at 14:15

## 2019-11-12 RX ADMIN — ACETAMINOPHEN 650 MG: 325 TABLET, FILM COATED ORAL at 11:03

## 2019-11-12 RX ADMIN — QUETIAPINE FUMARATE 100 MG: 100 TABLET ORAL at 20:42

## 2019-11-12 RX ADMIN — FLUPHENAZINE HYDROCHLORIDE 1 MG: 1 TABLET, FILM COATED ORAL at 09:56

## 2019-11-12 RX ADMIN — GABAPENTIN 600 MG: 300 CAPSULE ORAL at 20:42

## 2019-11-12 RX ADMIN — GABAPENTIN 600 MG: 300 CAPSULE ORAL at 15:21

## 2019-11-12 RX ADMIN — NICOTINE 1 PATCH: 14 PATCH, EXTENDED RELEASE TRANSDERMAL at 09:58

## 2019-11-12 RX ADMIN — LORAZEPAM 1 MG: 1 TABLET ORAL at 16:32

## 2019-11-12 RX ADMIN — GABAPENTIN 600 MG: 300 CAPSULE ORAL at 09:57

## 2019-11-12 ASSESSMENT — ACTIVITIES OF DAILY LIVING (ADL)
ORAL_HYGIENE: INDEPENDENT
DRESS: INDEPENDENT
HYGIENE/GROOMING: INDEPENDENT

## 2019-11-12 ASSESSMENT — MIFFLIN-ST. JEOR: SCORE: 1686.6

## 2019-11-12 NOTE — PROGRESS NOTES
Group Psychotherapy    Topic: Self Reflection - Personal Growth and Change    Hours: No charge (Patient left within moments of group starting)    Response: Patient stated she would not be able to sit through group and left before we started.       Facilitator: Joana Marroquin LPCC, LADC

## 2019-11-12 NOTE — PROGRESS NOTES
11/11/19 6150   General Information   Art Directive other (see comments)   AT directive is to create an image using a hand tracing and positive affirmations/I am statement using art media of pts choice. Goals of directive: emotional expression, identifying personal strengths/goals. Pt worked on a coloring sheet and observed for the majority of group.   Pts mood was calm.

## 2019-11-12 NOTE — PROGRESS NOTES
Pt was intermittently present in the milieu and somewhat social with one peer. Pt was observed eating snack/dinner, briefly attended the OT group, talking on the phone, reading, and laying/sleeping in room. Pt presented with a full range affect and remained calm throughout the shift. However, ptt endorsed feeling anxious. Pt did not report experiencing SI, HI, or SIB and no clear indication of these symptoms was observed.       11/11/19 1841   Behavioral Health   Hallucinations   (unsubstantiated)   Thinking distractable   Orientation person: oriented;place: oriented;date: oriented;time: oriented   Memory   (appears baseline)   Insight poor   Judgement impaired   Eye Contact at examiner   Affect full range affect   Mood anxious;mood is calm   Physical Appearance/Attire attire appropriate to age and situation   Hygiene   (adequate)   Suicidality   (pt did not report thoughts or urges)   Self Injury   (pt did not report thoughts or urges)   Elopement   (no apparent risk)   Activity restless   Speech clear;coherent   Medication Sensitivity no stated side effects;no observed side effects   Psychomotor / Gait balanced;steady   Activities of Daily Living   Hygiene/Grooming independent   Oral Hygiene independent   Dress street clothes;independent   Laundry   (pt did not do laundry)   Room Organization independent   Activity   Activity Assistance Provided independent

## 2019-11-12 NOTE — PROGRESS NOTES
"Pt displayed anxious mood this day shift, expressing she \"didn't feel ready to be off my one to one; or ready to leave here\". Pt checked in with staff, spoke with doctor and nurse about her medications, DBT skills, and how she would be taken off her SIO tomorrow. Pt reported anxiety regarding this, but said she knows it's the best thing to do, needing to be \"more independent\". Pt was pleasant toward staff, reminded to use coping skills, and was given 15 ft SIO change for staff to give her more independence. Pt reported auditory hallucinations, \"telling me to kill myself, or to hurt myself\", but denied any plan and has contracted for safety. Pt reported, \"I had a phone call with my wife earlier and that was rough\". Pt admits \"fleeting thoughts\" of suicide, but denies SIB/HI. Endorses AH, reporting \"this is constant\", but denies VH.      11/12/19 1419   Behavioral Health   Hallucinations auditory   Thinking distractable   Orientation person: oriented;place: oriented;date: oriented;time: oriented   Memory baseline memory   Insight poor   Judgement impaired   Eye Contact at examiner   Affect full range affect   Mood anxious;depressed;mood is calm   Physical Appearance/Attire attire appropriate to age and situation   Hygiene other (see comment)  (neat)   Suicidality thoughts only   1. Wish to be Dead (Recent) Yes   Wish to be Dead Description (Recent)   (\"just thoughts, no plan\")   2. Non-Specific Active Suicidal Thoughts (Recent) Yes   Non-Specific Active Suicidal Thought Description (Recent) Passive thoughts, no plan, contracts for safety   3. Active Sucidal Ideation with any Methods (Not Plan) Without Intent to Act (Recent) Yes   Active Suicidal Ideation with any Methods (Not Plan) Description (Recent) suicidal thoughts said today would not act on it   4. Active Suicidal Ideation with Some Intent to Act, Without Specific Plan (Recent) Yes   Active Suicidal Ideation with Some Intent to Act, Without Specific Plan " Description (Recent) See above   5. Active Suicidal Ideation with Specific Plan and Intent (Recent) Yes   Active Suicidal Ideation with Specific Plan and Intent Description (Recent) see above   Duration (Lifetime) NA   Psycho Education   Type of Intervention 1:1 intervention   Response participates, initiates socially appropriate   Hours 0.5   Treatment Detail Check-in

## 2019-11-13 PROCEDURE — 99232 SBSQ HOSP IP/OBS MODERATE 35: CPT | Mod: GC | Performed by: PSYCHIATRY & NEUROLOGY

## 2019-11-13 PROCEDURE — 25000132 ZZH RX MED GY IP 250 OP 250 PS 637: Performed by: PSYCHIATRY & NEUROLOGY

## 2019-11-13 PROCEDURE — 25000132 ZZH RX MED GY IP 250 OP 250 PS 637: Performed by: STUDENT IN AN ORGANIZED HEALTH CARE EDUCATION/TRAINING PROGRAM

## 2019-11-13 PROCEDURE — 90832 PSYTX W PT 30 MINUTES: CPT

## 2019-11-13 PROCEDURE — 25000128 H RX IP 250 OP 636: Performed by: STUDENT IN AN ORGANIZED HEALTH CARE EDUCATION/TRAINING PROGRAM

## 2019-11-13 PROCEDURE — H2032 ACTIVITY THERAPY, PER 15 MIN: HCPCS

## 2019-11-13 PROCEDURE — 12400001 ZZH R&B MH UMMC

## 2019-11-13 RX ORDER — PHENOBARBITAL 32.4 MG/1
32.4 TABLET ORAL AT BEDTIME
Status: DISCONTINUED | OUTPATIENT
Start: 2019-11-13 | End: 2019-11-18

## 2019-11-13 RX ORDER — OLANZAPINE 10 MG/1
10 TABLET, ORALLY DISINTEGRATING ORAL EVERY 4 HOURS PRN
Status: DISCONTINUED | OUTPATIENT
Start: 2019-11-13 | End: 2019-11-20

## 2019-11-13 RX ORDER — OLANZAPINE 10 MG/2ML
10 INJECTION, POWDER, FOR SOLUTION INTRAMUSCULAR EVERY 4 HOURS PRN
Status: DISCONTINUED | OUTPATIENT
Start: 2019-11-13 | End: 2019-11-20

## 2019-11-13 RX ORDER — PHENOBARBITAL 16.2 MG/1
16.2 TABLET ORAL 2 TIMES DAILY
Status: COMPLETED | OUTPATIENT
Start: 2019-11-13 | End: 2019-11-19

## 2019-11-13 RX ADMIN — GABAPENTIN 600 MG: 300 CAPSULE ORAL at 14:00

## 2019-11-13 RX ADMIN — GUANFACINE 1 MG: 1 TABLET ORAL at 16:23

## 2019-11-13 RX ADMIN — GABAPENTIN 600 MG: 300 CAPSULE ORAL at 20:50

## 2019-11-13 RX ADMIN — NICOTINE POLACRILEX 8 MG: 4 GUM, CHEWING ORAL at 10:09

## 2019-11-13 RX ADMIN — LITHIUM CARBONATE 900 MG: 450 TABLET, EXTENDED RELEASE ORAL at 20:51

## 2019-11-13 RX ADMIN — PHENOBARBITAL 16.2 MG: 16.2 TABLET ORAL at 14:00

## 2019-11-13 RX ADMIN — NICOTINE 1 PATCH: 14 PATCH, EXTENDED RELEASE TRANSDERMAL at 09:40

## 2019-11-13 RX ADMIN — MIRTAZAPINE 30 MG: 30 TABLET, FILM COATED ORAL at 20:50

## 2019-11-13 RX ADMIN — GABAPENTIN 600 MG: 300 CAPSULE ORAL at 09:38

## 2019-11-13 RX ADMIN — LITHIUM CARBONATE 300 MG: 300 TABLET, EXTENDED RELEASE ORAL at 09:39

## 2019-11-13 RX ADMIN — OLANZAPINE 10 MG: 10 TABLET, ORALLY DISINTEGRATING ORAL at 21:32

## 2019-11-13 RX ADMIN — PHENOBARBITAL 32.4 MG: 16.2 TABLET ORAL at 19:09

## 2019-11-13 RX ADMIN — GUANFACINE 1 MG: 1 TABLET ORAL at 10:19

## 2019-11-13 RX ADMIN — FLUPHENAZINE HYDROCHLORIDE 1 MG: 1 TABLET, FILM COATED ORAL at 09:38

## 2019-11-13 RX ADMIN — QUETIAPINE FUMARATE 100 MG: 100 TABLET ORAL at 20:50

## 2019-11-13 RX ADMIN — FEXOFENADINE HCL 60 MG: 60 TABLET, FILM COATED ORAL at 09:38

## 2019-11-13 RX ADMIN — NICOTINE POLACRILEX 8 MG: 4 GUM, CHEWING ORAL at 13:25

## 2019-11-13 RX ADMIN — FLUPHENAZINE DECANOATE 6.25 MG: 25 INJECTION, SOLUTION INTRAMUSCULAR; SUBCUTANEOUS at 13:14

## 2019-11-13 RX ADMIN — PHENOBARBITAL 16.2 MG: 16.2 TABLET ORAL at 10:56

## 2019-11-13 RX ADMIN — NICOTINE POLACRILEX 8 MG: 4 GUM, CHEWING ORAL at 19:07

## 2019-11-13 RX ADMIN — OLANZAPINE 10 MG: 10 TABLET, ORALLY DISINTEGRATING ORAL at 18:01

## 2019-11-13 NOTE — PLAN OF CARE
"Patient becoming more agitated when SIO staff kept a further distance from her per md order. She began pacing more. Told staff that there was going to be \" a big effing code in 10 min.\" Staff determined it would be a safety measure to remove the chairs that she had been sitting on that were outside her room in the hallway. Patient came to med window and was offered prn med. Patient stated that the tenex would be enough and that she would wait on prn zyprexa. She asked if she could be taken off of the SIO. Was told that for tonight it would remain in place. Patient contracted for safety and stated she was feeling better. Patient then returned to outside of her room. She became angry and was flipping off staff. She went into her room and begun to punch the walls. Emergency beeper was pressed. Patient took oral zyprexa and was walked by staff to seclusion. Beena Villanueva RN    "
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
## 2019-11-13 NOTE — PROGRESS NOTES
Pt was agitated at the beginning of shift due to staff setting boundaries with pt.  Pt slowly through the shift became more agitated pacing the song and now following staff directions.  Pt became verbally aggressive with staff by cussing them out and showing them her middle finger.  Pt continued escalating and ignored staff's suggestions for self de escalation.  Pt then stormed into their room and punch their rooms wall with a closed fist 3-4 times hard.  Writer pushed [employer] beeper.  RN administered medication and made the call that pt was to escalated and a threat to staff and other pts.  Pt agreed to walk to seclusion.  Psychiatric associates walked pt to seclusion.  Once there pt refused to comply with directions and psych associates took control and performed a take down.  After processing out of seclusion pt was less agitated.  Staff reinforced boundaries with pt and pt acknowledged staff appropriately.  Pt was calm the rest of the shift an in bed resting around 2050.  Pt ate dinner and snacks.  Pt admits to SI.         11/12/19 1750   Behavioral Health   Hallucinations auditory   Thinking distractable   Orientation person: oriented;place: oriented;date: oriented;time: oriented   Memory baseline memory   Insight poor   Judgement impaired   Eye Contact at examiner   Affect full range affect   Mood depressed;anxious;irritable   Physical Appearance/Attire attire appropriate to age and situation   Hygiene neglected grooming - unclean body, hair, teeth   Suicidality thoughts only   1. Wish to be Dead (Recent) Yes   2. Non-Specific Active Suicidal Thoughts (Recent) Yes   3. Active Sucidal Ideation with any Methods (Not Plan) Without Intent to Act (Recent) Yes   Activity restless   Speech clear;coherent   Psychomotor / Gait balanced;steady   Psycho Education   Type of Intervention 1:1 intervention   Response observes from a distance   Activities of Daily Living   Hygiene/Grooming independent   Oral Hygiene  independent   Dress independent   Room Organization independent

## 2019-11-13 NOTE — PROGRESS NOTES
CLINICAL NUTRITION SERVICES - REASSESSMENT NOTE     Nutrition Prescription    RECOMMENDATIONS FOR MDs/PROVIDERS TO ORDER:  None at this time     Malnutrition Status:    Patient does not meet two of the criteria necessary for diagnosing malnutrition     Recommendations already ordered by Registered Dietitian (RD):  - Continue Propel Zero TID  - Discontinue the following: double sides, 10 AM - Bagel with butter, and 8 PM - Yogurt     Future/Additional Recommendations:  - Monitor meal and snack intakes and adjust as desired by the pt  - Monitor weight trends     EVALUATION OF THE PROGRESS TOWARD GOALS   Diet: Regular, Food Snack TID: 10 AM - Bagel with butter, 2 PM - Jello, 8 PM - Yogurt; Propel Zero TID with meals  Intake: Pt reports that her appetite is good and that she has been eating 100% of TID meals and TID snacks. She has also been drinking Propel Zero TID. Since Ayana's appetite has improved and she is eating 100% of TID meals, she is okay with discontinuing the AM snack, HS snack, and double sides.        NEW FINDINGS   Pt has gained 6# since last assess by an RD about a weeks ago.  Wt Readings from Last 1 Encounters:   11/12/19 96.1 kg (211 lb 12.8 oz)     MALNUTRITION  % Intake: No decreased intake noted  % Weight Loss: None noted  Subcutaneous Fat Loss: None observed  Muscle Loss: None observed  Fluid Accumulation/Edema: None noted  Malnutrition Diagnosis: Patient does not meet two of the above criteria necessary for diagnosing malnutrition    Previous Goals   Patient to consume % of nutritionally adequate meal trays TID, or the equivalent with supplements/snacks.  Evaluation: Met    Previous Nutrition Diagnosis  Predicted excessive nutrient intake related to increased hunger as evidenced by pt report with increased weight trends since admission to facility   Evaluation: No change    CURRENT NUTRITION DIAGNOSIS  Predicted excessive nutrient intake related to increased hunger as evidenced by pt report  with increased weight trends since admission to facility      INTERVENTIONS  Implementation  - Modify composition of meals/snacks  - Nutrition education - reinforced education on the principles of My Plate and encouraged the pt to use 2-3 of her sides on fruits and veggies.     Goals  Patient to consume % of nutritionally adequate meal trays TID, or the equivalent with supplements/snacks.    Monitoring/Evaluation  Progress toward goals will be monitored and evaluated per protocol.      Lauryn Simmons RD, LD  Pager: [phone]
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
## 2019-11-13 NOTE — PLAN OF CARE
BEHAVIORAL TEAM DISCUSSION    Participants:   Mary Willson, Attending Psychiatrist  Lorelei Bella, PGY-1  Joana Marroquin, [facility], LADC, CTC  Vangie Gonzalez, RN  Kayla Hurtado, MS3   Progress: Patient still endorses SI, will come to staff when having difficulty with regulating emotions. SIO was discontinued today.  Anticipated length of stay: Undetermined  Continued Stay Criteria/Rationale: Committed with [facility]  Medical/Physical: No acute issues  Precautions:   Behavioral Orders   Procedures     Code 1 - Restrict to Unit     Elopement precautions     Routine Programming     As clinically indicated     Self Injury Precaution     Status 15     Every 15 minutes.     Suicide precautions     Patients on Suicide Precautions should have a Combination Diet ordered that includes a Diet selection(s) AND a Behavioral Tray selection for Safe Tray - with utensils, or Safe Tray - NO utensils     Plan: Patient care decisions will only be made during team each morning. Patient and staff will obtain a copy each day.   Rationale for change in precautions or plan: No change at present-will continue to monitor and adjust as needed

## 2019-11-13 NOTE — PROGRESS NOTES
"Patient has been in milieu all shift.  Patient is often on the periphery but does engage with select peers and staff.  Patient was agitated this morning briefly with the doctor and what the doctor had decided in their check-in.  Patient did calm with redirection.  Patient was blaming of staff for last night and also the doctor this morning.  Blaming other for \"making me go off.\".  Patient accepted constructive feedback from writer that she is the person responsible for her own behavior.  She should not blame others.  Patient did remain calm and appropriate much of the day.  Patient does state she is having auditory hallucinations stating for her to kill herself.  RN was notified.  Patient did contract for safety.  Patient stated she would seek out staff if she felt like acting on those thoughts.       11/13/19 1436   Behavioral Health   Hallucinations auditory   Thinking distractable   Orientation person: oriented;place: oriented;date: oriented   Memory baseline memory   Insight insight appropriate to situation   Judgement impaired   Eye Contact at examiner   Affect full range affect   Mood mood is calm   Physical Appearance/Attire attire appropriate to age and situation   Hygiene neglected grooming - unclean body, hair, teeth   Suicidality thoughts only   1. Wish to be Dead (Recent) Yes  (rn notified)   2. Non-Specific Active Suicidal Thoughts (Recent) No   Self Injury other (see comment)  (denies currently)   Elopement   (none stated or observed)   Activity other (see comment)  (in milieu)   Speech clear;coherent   Medication Sensitivity no stated side effects   Psychomotor / Gait balanced;steady   Psycho Education   Type of Intervention 1:1 intervention   Response participates, initiates socially appropriate   Hours 0.5   Treatment Detail check-in           "

## 2019-11-14 PROCEDURE — 90853 GROUP PSYCHOTHERAPY: CPT

## 2019-11-14 PROCEDURE — 25000132 ZZH RX MED GY IP 250 OP 250 PS 637: Performed by: STUDENT IN AN ORGANIZED HEALTH CARE EDUCATION/TRAINING PROGRAM

## 2019-11-14 PROCEDURE — 12400001 ZZH R&B MH UMMC

## 2019-11-14 PROCEDURE — 99232 SBSQ HOSP IP/OBS MODERATE 35: CPT | Mod: GC | Performed by: PSYCHIATRY & NEUROLOGY

## 2019-11-14 PROCEDURE — 25000132 ZZH RX MED GY IP 250 OP 250 PS 637: Performed by: PSYCHIATRY & NEUROLOGY

## 2019-11-14 RX ADMIN — NICOTINE POLACRILEX 8 MG: 4 GUM, CHEWING ORAL at 08:49

## 2019-11-14 RX ADMIN — PHENOBARBITAL 32.4 MG: 16.2 TABLET ORAL at 18:59

## 2019-11-14 RX ADMIN — GABAPENTIN 600 MG: 300 CAPSULE ORAL at 08:39

## 2019-11-14 RX ADMIN — FEXOFENADINE HCL 60 MG: 60 TABLET, FILM COATED ORAL at 08:39

## 2019-11-14 RX ADMIN — PHENOBARBITAL 16.2 MG: 16.2 TABLET ORAL at 13:23

## 2019-11-14 RX ADMIN — GABAPENTIN 600 MG: 300 CAPSULE ORAL at 20:51

## 2019-11-14 RX ADMIN — LITHIUM CARBONATE 300 MG: 300 TABLET, EXTENDED RELEASE ORAL at 08:43

## 2019-11-14 RX ADMIN — NICOTINE POLACRILEX 8 MG: 4 GUM, CHEWING ORAL at 13:23

## 2019-11-14 RX ADMIN — QUETIAPINE FUMARATE 100 MG: 100 TABLET ORAL at 20:51

## 2019-11-14 RX ADMIN — NICOTINE 1 PATCH: 14 PATCH, EXTENDED RELEASE TRANSDERMAL at 08:37

## 2019-11-14 RX ADMIN — LITHIUM CARBONATE 900 MG: 450 TABLET, EXTENDED RELEASE ORAL at 20:51

## 2019-11-14 RX ADMIN — PHENOBARBITAL 16.2 MG: 16.2 TABLET ORAL at 08:40

## 2019-11-14 RX ADMIN — NICOTINE POLACRILEX 8 MG: 4 GUM, CHEWING ORAL at 15:26

## 2019-11-14 RX ADMIN — GUANFACINE 1 MG: 1 TABLET ORAL at 08:40

## 2019-11-14 RX ADMIN — MIRTAZAPINE 30 MG: 30 TABLET, FILM COATED ORAL at 20:51

## 2019-11-14 RX ADMIN — GABAPENTIN 600 MG: 300 CAPSULE ORAL at 13:23

## 2019-11-14 RX ADMIN — NICOTINE POLACRILEX 8 MG: 4 GUM, CHEWING ORAL at 16:39

## 2019-11-14 RX ADMIN — GUANFACINE 1 MG: 1 TABLET ORAL at 16:38

## 2019-11-14 RX ADMIN — OLANZAPINE 10 MG: 10 TABLET, ORALLY DISINTEGRATING ORAL at 20:54

## 2019-11-14 RX ADMIN — NICOTINE POLACRILEX 8 MG: 4 GUM, CHEWING ORAL at 20:36

## 2019-11-14 ASSESSMENT — ACTIVITIES OF DAILY LIVING (ADL)
HYGIENE/GROOMING: INDEPENDENT
DRESS: INDEPENDENT
LAUNDRY: WITH SUPERVISION
LAUNDRY: WITH SUPERVISION
ORAL_HYGIENE: INDEPENDENT
HYGIENE/GROOMING: INDEPENDENT
DRESS: INDEPENDENT
ORAL_HYGIENE: INDEPENDENT

## 2019-11-14 ASSESSMENT — MIFFLIN-ST. JEOR: SCORE: 1649.86

## 2019-11-14 NOTE — PROGRESS NOTES
"Pt presented with coherent thought process and appropriate behavior. She was tense and irritable in the morning, reflecting on interactions with staff last evening. Pt expressed she felt there were inconsistencies in care/regulations and believed staff were responding to her negatively. Pt noted she perceived staff were mistrusting which caused her to feel \"So fucking angry.\" Pt was receptive to given support and reiterated multiple times that she wanted a \"fresh start.\" She talked at length with writer about coping skills she can use to convey her needs respectfully and created a list of grounding activities. Pt also devised a list of concerns to address with care team surrounding personal items, staff communication, and medication. Pt noted she was \"Really nervous\" about visit with her wife and made absolute statements about the interaction going poorly. She brainstormed ways she can set appropriate boundaries, redirect discussion, and use \"I' statements. Pt spent majority of shift reading, listening to music, and pacing. She continues to endorse depression and SI but denied current plan or intent. No psychotic symptoms were reported or observed.          11/14/19 1400 Behavioral Health   Hallucinations denies / not responding to hallucinations   Thinking poor concentration   Orientation person: oriented;place: oriented;date: oriented;time: oriented   Memory baseline memory   Insight poor   Judgement impaired   Affect full range affect   Mood mood is calm;anxious   Physical Appearance/Attire appears stated age;attire appropriate to age and situation   Hygiene neglected grooming - unclean body, hair, teeth   1. Wish to be Dead (Recent) No   Wish to be Dead Description (Recent) Pt denies   2. Non-Specific Active Suicidal Thoughts (Recent) No   Non-Specific Active Suicidal Thought Description (Recent) Pt denies   Psycho Education   Type of Intervention 1:1 intervention   Response participates, initiates socially " appropriate   Hours 1   Treatment Detail Coping skills for anxiety/anger management   Activities of Daily Living   Hygiene/Grooming independent   Oral Hygiene independent   Dress independent   Laundry with supervision   Room Organization independent

## 2019-11-14 NOTE — PROGRESS NOTES
" 11/13/19 2100   Art Therapy   Type of Intervention structured groups   Response Participated with encouragement    Hours 1   Treatment Detail    (Art Therapy- I am giovanni) material exploration   Goal- cope, regulate and express through Art Therapy directive     Outcome- pt attended the Art Therapy group and really grasped this project and for the first time writer has seen, did a very detailed affirmative image about tennis. She made her giovanni a tennis racket with a shaded and detailed tennis ball in the middle. She experimented with watercolor resist technique. 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 She said I don't play now because \" I am too fat.\"  She also talked about having some back injuries that make playing difficult.     Spirits were good in this group.Then writer met 1:1 with pt for about 30 minutes. She didn't enjoy the tempera paint sticks, she said she liked her tennis image much better.  Mood was not as bright, when talking about her relationship which is the topic 1:1 she chooses most to discuss.    When she talks about her wife, she says she gets angry . She was asking advise about what to do. Writer suggested she talk to her case mangager tomorrow. She says she isn't sure it is a good idea to go home if her partner is telling her that she doesn't love her anymore and the marriage is over. She is afraid she wont have a place to go. She talked about an IRTS possibly. Writer deferred the conversaition to doctor and case management tomorrow on the unit. She was talking about pros and cons of leaving her relationship. Writer suggested maybe couples therapy would be helpful, but she said wife said she is too busy. Writer worked with techniques for anger. DBT stop skills, art making which she says helps, identifying sadness behind her anger. When we were talking she said she almost cried, thinking about the marriage being over , but she couldn't cry. Writealeena and her 1:1 staff talked " about making a list of calming tools available on the unit. She said she punched a wall earlier because staff were busy and couldn't get to her soon enough and she impulsively acts with anger.When she talks about the codes, writer detects that she likes to talk about the codes as it is a way for her to have attention and someone to talk to when those incidents happen.
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
## 2019-11-14 NOTE — PLAN OF CARE
"Pt states she is hearing  voices urging self harm , pt was placed back on a sio at 1630 this evening, When patient was told the metal tip markers would have to be removed from her room she called the staff , \"avila\" and later requested a prn of Zyprexa   "

## 2019-11-15 PROCEDURE — 25000132 ZZH RX MED GY IP 250 OP 250 PS 637: Performed by: PSYCHIATRY & NEUROLOGY

## 2019-11-15 PROCEDURE — G0177 OPPS/PHP; TRAIN & EDUC SERV: HCPCS

## 2019-11-15 PROCEDURE — 99232 SBSQ HOSP IP/OBS MODERATE 35: CPT | Mod: GC | Performed by: PSYCHIATRY & NEUROLOGY

## 2019-11-15 PROCEDURE — H2032 ACTIVITY THERAPY, PER 15 MIN: HCPCS

## 2019-11-15 PROCEDURE — 90837 PSYTX W PT 60 MINUTES: CPT

## 2019-11-15 PROCEDURE — 25000132 ZZH RX MED GY IP 250 OP 250 PS 637: Performed by: STUDENT IN AN ORGANIZED HEALTH CARE EDUCATION/TRAINING PROGRAM

## 2019-11-15 PROCEDURE — 12400001 ZZH R&B MH UMMC

## 2019-11-15 RX ORDER — GUANFACINE 1 MG/1
1 TABLET ORAL 3 TIMES DAILY
Status: DISCONTINUED | OUTPATIENT
Start: 2019-11-15 | End: 2019-11-18

## 2019-11-15 RX ORDER — GABAPENTIN 400 MG/1
800 CAPSULE ORAL 3 TIMES DAILY
Status: DISCONTINUED | OUTPATIENT
Start: 2019-11-15 | End: 2019-12-17 | Stop reason: HOSPADM

## 2019-11-15 RX ADMIN — QUETIAPINE FUMARATE 100 MG: 100 TABLET ORAL at 20:19

## 2019-11-15 RX ADMIN — GUANFACINE 1 MG: 1 TABLET ORAL at 09:50

## 2019-11-15 RX ADMIN — LITHIUM CARBONATE 900 MG: 450 TABLET, EXTENDED RELEASE ORAL at 20:20

## 2019-11-15 RX ADMIN — GABAPENTIN 600 MG: 300 CAPSULE ORAL at 09:44

## 2019-11-15 RX ADMIN — NICOTINE POLACRILEX 2 MG: 2 GUM, CHEWING BUCCAL at 15:03

## 2019-11-15 RX ADMIN — NICOTINE 1 PATCH: 14 PATCH, EXTENDED RELEASE TRANSDERMAL at 09:42

## 2019-11-15 RX ADMIN — GABAPENTIN 800 MG: 400 CAPSULE ORAL at 20:19

## 2019-11-15 RX ADMIN — NICOTINE POLACRILEX 4 MG: 2 GUM, CHEWING BUCCAL at 18:42

## 2019-11-15 RX ADMIN — NICOTINE POLACRILEX 8 MG: 4 GUM, CHEWING ORAL at 12:37

## 2019-11-15 RX ADMIN — GABAPENTIN 800 MG: 400 CAPSULE ORAL at 15:02

## 2019-11-15 RX ADMIN — NICOTINE POLACRILEX 8 MG: 4 GUM, CHEWING ORAL at 10:02

## 2019-11-15 RX ADMIN — ACETAMINOPHEN 650 MG: 325 TABLET, FILM COATED ORAL at 10:33

## 2019-11-15 RX ADMIN — FEXOFENADINE HCL 60 MG: 60 TABLET, FILM COATED ORAL at 09:46

## 2019-11-15 RX ADMIN — MIRTAZAPINE 30 MG: 30 TABLET, FILM COATED ORAL at 20:20

## 2019-11-15 RX ADMIN — PHENOBARBITAL 32.4 MG: 16.2 TABLET ORAL at 19:02

## 2019-11-15 RX ADMIN — PHENOBARBITAL 16.2 MG: 16.2 TABLET ORAL at 15:02

## 2019-11-15 RX ADMIN — PHENOBARBITAL 16.2 MG: 16.2 TABLET ORAL at 09:44

## 2019-11-15 RX ADMIN — NICOTINE POLACRILEX 8 MG: 4 GUM, CHEWING ORAL at 11:47

## 2019-11-15 RX ADMIN — NICOTINE POLACRILEX 8 MG: 4 GUM, CHEWING ORAL at 19:45

## 2019-11-15 RX ADMIN — LITHIUM CARBONATE 300 MG: 300 TABLET, EXTENDED RELEASE ORAL at 09:46

## 2019-11-15 RX ADMIN — NICOTINE POLACRILEX 2 MG: 2 GUM, CHEWING BUCCAL at 16:48

## 2019-11-15 RX ADMIN — GUANFACINE 1 MG: 1 TABLET ORAL at 15:02

## 2019-11-15 RX ADMIN — GUANFACINE 1 MG: 1 TABLET ORAL at 21:28

## 2019-11-15 ASSESSMENT — ACTIVITIES OF DAILY LIVING (ADL)
ORAL_HYGIENE: INDEPENDENT
LAUNDRY: WITH SUPERVISION
DRESS: INDEPENDENT
HYGIENE/GROOMING: INDEPENDENT

## 2019-11-15 NOTE — PROGRESS NOTES
Ayana   attended 1 of 3 OT groups today. She wrote an encouraging letter to herself during a creative reflection groups this a.m. Discussed with this writer & her 1:1 staff person various ways she can practice self-care & take action to build the life she seeks. Amenable to this honest and direct discourse.      11/15/19 1500   Occupational Therapy   Type of Intervention structured groups   Response Initiates, socially acceptable   Hours 1
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
## 2019-11-15 NOTE — PROGRESS NOTES
Patient was in milieu all shift and was pleasant and social.  Patient states she continues to have auditory hallucinations.  Patient states that the voices continue to encourage her to kill herself.  Patient states she continues to have S/I thoughts but states she will seek out staff if she feels like acting on those thoughts.  Patient attended some groups.     11/15/19 1400   Behavioral Health   Hallucinations auditory   Thinking distractable   Orientation person: oriented;place: oriented;date: oriented;time: oriented   Memory baseline memory   Insight poor   Judgement impaired   Eye Contact at examiner   Affect full range affect   Mood mood is calm   Physical Appearance/Attire attire appropriate to age and situation   Hygiene well groomed   Suicidality other (see comments);thoughts only  (contracts for safety)   1. Wish to be Dead (Recent) No   2. Non-Specific Active Suicidal Thoughts (Recent) No   Self Injury other (see comment)  (denies)   Activity other (see comment)  (in milieu )   Speech clear;coherent   Medication Sensitivity no stated side effects   Psychomotor / Gait balanced;steady   Psycho Education   Type of Intervention 1:1 intervention   Response participates, initiates socially appropriate   Hours 0.5

## 2019-11-15 NOTE — PROGRESS NOTES
11/14/19 8914   Group Therapy Session   Group Attendance attended group session   Total Time (minutes) 45   Group Type psychotherapeutic   Group Topic Covered other (see comments)   Patient Participation/Contribution cooperative with task;discussed personal experience with topic;listened actively   Patient participated in psychotherapy group which included a mindfulness activity focusing on the 5 senses and then processing as a group.  Ayana initially presented as withdrawn, flat affect/mood. She reported feeling anxious.  She engaged in the activity and processed her responses with the group. Her mood improved as she shared and listened to others and demonstrated positive social engagement. She also participated in the relaxation exercise at the end of group.

## 2019-11-15 NOTE — PROGRESS NOTES
Pt had a calm shift.  Pt spent time in lounge watching TV and resting in room.  Pt stated they are depressed and have thoughts of suicide due to their wife wanting a divorce.  Pt stated that their wife is scared of them.  When asked why her wife is scared of her, pt stated that she made threats to her today while she visited.  Pt ate dinner and snacks.  Pt attended OT.  Pt endorses SI and SIB.         11/14/19 8013   Behavioral Health   Hallucinations denies / not responding to hallucinations   Thinking poor concentration   Orientation person: oriented;place: oriented;date: oriented;time: oriented   Memory baseline memory   Insight poor   Judgement impaired   Eye Contact at examiner   Affect full range affect   Mood mood is calm;anxious;depressed   Physical Appearance/Attire attire appropriate to age and situation   Hygiene neglected grooming - unclean body, hair, teeth   1. Wish to be Dead (Recent) Yes   2. Non-Specific Active Suicidal Thoughts (Recent) Yes   3. Active Sucidal Ideation with any Methods (Not Plan) Without Intent to Act (Recent) Yes   4. Active Suicidal Ideation with Some Intent to Act, Without Specific Plan (Recent) Yes   Elopement Statements about wanting to leave   Activity withdrawn   Speech clear;coherent   Psychomotor / Gait balanced;steady   Psycho Education   Type of Intervention 1:1 intervention   Response participates, initiates socially appropriate   Hours 0.5   Treatment Detail Check-in   Activities of Daily Living   Hygiene/Grooming independent   Oral Hygiene independent   Dress independent   Laundry with supervision   Room Organization independent

## 2019-11-15 NOTE — PROGRESS NOTES
"Brief Progress Note    Patient seen on unit in the late afternoon to discuss SIO. Patient reports she is feeling \"the same as this morning, the same as last night.\" She endorses ongoing, very distressing voices telling her to kill herself. She does not believe she is able to keep herself safe at this time without an SIO.   "

## 2019-11-15 NOTE — PROGRESS NOTES
Writer met with patient, she signed VAL. Referral to [facility] made. VAL in patient chart.    [facility]. [facility] [phone] [phone]

## 2019-11-16 PROCEDURE — 25000132 ZZH RX MED GY IP 250 OP 250 PS 637: Performed by: PSYCHIATRY & NEUROLOGY

## 2019-11-16 PROCEDURE — 25000132 ZZH RX MED GY IP 250 OP 250 PS 637: Performed by: STUDENT IN AN ORGANIZED HEALTH CARE EDUCATION/TRAINING PROGRAM

## 2019-11-16 PROCEDURE — 12400001 ZZH R&B MH UMMC

## 2019-11-16 RX ADMIN — LITHIUM CARBONATE 300 MG: 300 TABLET, EXTENDED RELEASE ORAL at 11:22

## 2019-11-16 RX ADMIN — NICOTINE 1 PATCH: 14 PATCH, EXTENDED RELEASE TRANSDERMAL at 11:23

## 2019-11-16 RX ADMIN — MIRTAZAPINE 30 MG: 30 TABLET, FILM COATED ORAL at 20:46

## 2019-11-16 RX ADMIN — NICOTINE POLACRILEX 8 MG: 4 GUM, CHEWING ORAL at 11:24

## 2019-11-16 RX ADMIN — LITHIUM CARBONATE 900 MG: 450 TABLET, EXTENDED RELEASE ORAL at 20:46

## 2019-11-16 RX ADMIN — GUANFACINE 1 MG: 1 TABLET ORAL at 20:46

## 2019-11-16 RX ADMIN — PHENOBARBITAL 16.2 MG: 16.2 TABLET ORAL at 11:23

## 2019-11-16 RX ADMIN — OLANZAPINE 10 MG: 10 TABLET, ORALLY DISINTEGRATING ORAL at 01:15

## 2019-11-16 RX ADMIN — GABAPENTIN 800 MG: 400 CAPSULE ORAL at 20:45

## 2019-11-16 RX ADMIN — PHENOBARBITAL 32.4 MG: 16.2 TABLET ORAL at 20:47

## 2019-11-16 RX ADMIN — GUANFACINE 1 MG: 1 TABLET ORAL at 16:50

## 2019-11-16 RX ADMIN — OLANZAPINE 10 MG: 10 TABLET, ORALLY DISINTEGRATING ORAL at 12:05

## 2019-11-16 RX ADMIN — PHENOBARBITAL 16.2 MG: 16.2 TABLET ORAL at 16:51

## 2019-11-16 RX ADMIN — GABAPENTIN 800 MG: 400 CAPSULE ORAL at 11:22

## 2019-11-16 RX ADMIN — QUETIAPINE FUMARATE 100 MG: 100 TABLET ORAL at 20:46

## 2019-11-16 RX ADMIN — GABAPENTIN 800 MG: 400 CAPSULE ORAL at 16:51

## 2019-11-16 RX ADMIN — GUANFACINE 1 MG: 1 TABLET ORAL at 11:23

## 2019-11-16 RX ADMIN — FEXOFENADINE HCL 60 MG: 60 TABLET, FILM COATED ORAL at 11:22

## 2019-11-16 ASSESSMENT — ACTIVITIES OF DAILY LIVING (ADL)
HYGIENE/GROOMING: INDEPENDENT
DRESS: INDEPENDENT
ORAL_HYGIENE: INDEPENDENT
DRESS: INDEPENDENT
ORAL_HYGIENE: INDEPENDENT
LAUNDRY: WITH SUPERVISION
HYGIENE/GROOMING: INDEPENDENT

## 2019-11-16 NOTE — PROGRESS NOTES
In the milieu most of shift. Reading in am, slept in pm in lounge. Calm .        11/16/19 1400   Behavioral Health   Hallucinations denies / not responding to hallucinations   Thinking poor concentration   Orientation person: oriented;place: oriented;date: oriented;time: oriented   Memory baseline memory   Insight poor   Judgement impaired   Eye Contact at examiner   Affect full range affect   Mood anxious   Physical Appearance/Attire attire appropriate to age and situation   Hygiene other (see comment)  (adequate)   1. Wish to be Dead (Recent) No   2. Non-Specific Active Suicidal Thoughts (Recent) No   Activity other (see comment)  (Reading in milieu)   Speech clear;coherent   Psychomotor / Gait balanced;steady   Psycho Education   Type of Intervention structured groups   Response refuses   Activities of Daily Living   Hygiene/Grooming independent   Oral Hygiene independent   Dress independent   Laundry with supervision   Room Organization independent

## 2019-11-16 NOTE — PROGRESS NOTES
"Rec'd pt. sleeping soundly w/ regular non-labored respirations as shift commenced.  SIO staff w/i a 10 Ft presence for continued SIB/Suicide Precautions.  Up to bathroom at approx 0115.    Became instantaneously  agitated  w/her SIO staff when staff chose to allow bathroom door to be closed w/ outside door open and staff present in room door during pt's time in bathroom.  Yelling abusively at SIO and this staff when attempting to explain/discuss same w/ staff.  \"You don't know what the f--k you're talking about;  My doctor said I could have both my doors closed and that you had to be outside the room\".  Of note the Physician note governing pt's doors had been discontinued w/ a new order in place referencing staff distance from pt. Verbally abusive , talking over staff and refused any reasoning w/ staff.  Assured that this staff would ensure clarification re: this matter in the AM.   Due to  Suicide and SIB precautions/instructions pt. Used her bathroom w/ the bathroom door closed and the room door open w/ staff there.  Finally pt. was able to calm and accepted staff's continued supportive explanation  But requested prn Zyprexa.  Able to demonstrate no effective coping skills or willingness to work w/ staff toward her safety and comfort.  Was able to readily return to bed and sleep.  Wakeful again at approx 0500 and became immediately agitated because there was a male SIO in attendance at her rm door.  Rushed down to nurses station w/ loud vulgarity directed toward this staff questioning 'what in the f--k is up w this?  There's a man sitting outside my door!  What the f--k?\"  Then immediately brought up the issue of being allowed to have both her bathroom and room door closed anytime that she wanted to go inside her bathroom.  Again attempted to explain to Ayana that there was no current existing order to this effect and thus we would continue to ensure both her privacy and over all safety.  She then rushed back down " "the song, pushed the chair forward from in front of her door, went in to her room slamming her door quite loudly.  When told that her behavior was not acceptable, she responded \"leave me the f--k alone\".  Was able to again return to sleep w/i the hr. w/o observable distress.  Safe, therapeutic environment maintained.     "

## 2019-11-16 NOTE — PLAN OF CARE
"Ayana active on unit most of evening-social with staff and peers-does report continued SI-states she thinks of what she will do to kill herself when she discharges-states spoke with wife this evening and is concerned it will be very awkward when she returns to mother-in-laws home where they live with three children-spoke of various issues in their relationship and difficulty resolving them-states she was basically an only child because her brother is 21 yrs older and never lived in home while she was growing up-states her parents always gave her what ever she wanted and maybe that's part of why she has a hard time making herself do things she doesn't want to do, like go to work \"But I think I should be able to do what I want to do. Don't you?\"-Discussed increased ability to do what you want if you live alone, but continuous compromise it you choose to live with someone else-at times expresses interest in making effort to change patterns to allow improved relationship with spouse, but at other times feeling it is too much effort or she will not be able to succeed-Ayana stating interest in DBT-states she misses her family-sad they don't spend holidays together anymore because they live far apart-states if her parents lived near tx programs she would prefer to live with them-laughed that she frightened her wife by stating she wanted to kill her-spoke of feeling so angry that she couldn't control self and had a difficult time not lashing out-Ayana continues on SIO due to continued auditory hallucinations and thoughts to harm self  "

## 2019-11-17 PROCEDURE — 12400001 ZZH R&B MH UMMC

## 2019-11-17 PROCEDURE — 25000132 ZZH RX MED GY IP 250 OP 250 PS 637: Performed by: STUDENT IN AN ORGANIZED HEALTH CARE EDUCATION/TRAINING PROGRAM

## 2019-11-17 PROCEDURE — 25000132 ZZH RX MED GY IP 250 OP 250 PS 637: Performed by: PSYCHIATRY & NEUROLOGY

## 2019-11-17 RX ADMIN — GABAPENTIN 800 MG: 400 CAPSULE ORAL at 09:25

## 2019-11-17 RX ADMIN — NICOTINE 1 PATCH: 14 PATCH, EXTENDED RELEASE TRANSDERMAL at 09:25

## 2019-11-17 RX ADMIN — MIRTAZAPINE 30 MG: 30 TABLET, FILM COATED ORAL at 20:39

## 2019-11-17 RX ADMIN — PHENOBARBITAL 32.4 MG: 16.2 TABLET ORAL at 20:12

## 2019-11-17 RX ADMIN — GABAPENTIN 800 MG: 400 CAPSULE ORAL at 19:11

## 2019-11-17 RX ADMIN — LITHIUM CARBONATE 300 MG: 300 TABLET, EXTENDED RELEASE ORAL at 09:25

## 2019-11-17 RX ADMIN — GUANFACINE 1 MG: 1 TABLET ORAL at 20:39

## 2019-11-17 RX ADMIN — GUANFACINE 1 MG: 1 TABLET ORAL at 14:06

## 2019-11-17 RX ADMIN — OLANZAPINE 10 MG: 10 TABLET, ORALLY DISINTEGRATING ORAL at 17:35

## 2019-11-17 RX ADMIN — NICOTINE POLACRILEX 8 MG: 4 GUM, CHEWING ORAL at 16:50

## 2019-11-17 RX ADMIN — GUANFACINE 1 MG: 1 TABLET ORAL at 09:24

## 2019-11-17 RX ADMIN — LITHIUM CARBONATE 900 MG: 450 TABLET, EXTENDED RELEASE ORAL at 20:39

## 2019-11-17 RX ADMIN — NICOTINE POLACRILEX 8 MG: 4 GUM, CHEWING ORAL at 14:08

## 2019-11-17 RX ADMIN — QUETIAPINE FUMARATE 100 MG: 100 TABLET ORAL at 20:39

## 2019-11-17 RX ADMIN — PHENOBARBITAL 16.2 MG: 16.2 TABLET ORAL at 14:06

## 2019-11-17 RX ADMIN — GABAPENTIN 800 MG: 400 CAPSULE ORAL at 14:05

## 2019-11-17 RX ADMIN — FEXOFENADINE HCL 60 MG: 60 TABLET, FILM COATED ORAL at 09:24

## 2019-11-17 RX ADMIN — NICOTINE POLACRILEX 8 MG: 4 GUM, CHEWING ORAL at 18:40

## 2019-11-17 RX ADMIN — PHENOBARBITAL 16.2 MG: 16.2 TABLET ORAL at 09:25

## 2019-11-17 ASSESSMENT — ACTIVITIES OF DAILY LIVING (ADL)
DRESS: INDEPENDENT
HYGIENE/GROOMING: INDEPENDENT
LAUNDRY: WITH SUPERVISION
HYGIENE/GROOMING: INDEPENDENT
ORAL_HYGIENE: INDEPENDENT
LAUNDRY: WITH SUPERVISION
DRESS: INDEPENDENT
ORAL_HYGIENE: INDEPENDENT

## 2019-11-17 NOTE — PROGRESS NOTES
Pt spent the first half of the shift sleeping/laying on her bed, stated that her medications had made her tired and she wished to lay on her bed all day. Pt got down from bed eventually and hung around at lounge before going to bed at around 9. Mood appeared to be irritated at times but was pleasant and polite overall. Verbalized reluctance to check in but showed compliance anyway. Endorsed feeling slightly anxious, rated it a 5/10, mainly because of feeling lost in her life. Looking forward to talking to her treatment team on Monday. Denied any forms of hallucination and SI, SIB, HI.         11/16/19 0558   Behavioral Health   Hallucinations denies / not responding to hallucinations   Thinking distractable   Orientation person: oriented;place: oriented   Memory baseline memory   Insight poor;admits / accepts   Judgement impaired   Eye Contact at examiner   Affect irritable   Mood irritable;mood is calm   Physical Appearance/Attire disheveled   Hygiene neglected grooming - unclean body, hair, teeth   1. Wish to be Dead (Recent) No   2. Non-Specific Active Suicidal Thoughts (Recent) No   Self Injury   (None stated or observed)   Elopement   (None stated or observed)   Activity   (appropriate)   Speech clear;coherent   Medication Sensitivity   (Tiredness)   Psychomotor / Gait balanced;steady   Psycho Education   Type of Intervention 1:1 intervention   Response participates, initiates socially appropriate   Hours 0.5   Treatment Detail check in   Activities of Daily Living   Hygiene/Grooming independent   Oral Hygiene independent   Dress independent   Room Organization independent

## 2019-11-17 NOTE — PROGRESS NOTES
Writer has been meeting with pt 1:1 so writer checked in briefly during dinner as pt wasn't in group. She said she was doing ok but had been too tired to attend group. Affect was flat and blunted.

## 2019-11-17 NOTE — PROGRESS NOTES
11/17/19 1529   Behavioral Health   Hallucinations denies / not responding to hallucinations   Thinking distractable   Orientation place: oriented;person: oriented;date: oriented;time: oriented   Memory baseline memory   Insight poor   Judgement impaired   Eye Contact at examiner   Affect full range affect   Mood mood is calm   Physical Appearance/Attire attire appropriate to age and situation   Hygiene neglected grooming - unclean body, hair, teeth;well groomed   Suicidality thoughts and plan   1. Wish to be Dead (Recent) Yes   2. Non-Specific Active Suicidal Thoughts (Recent) No   Self Injury other (see comment)  (None Stated)   Elopement   (None Observed)   Activity other (see comment)  (Social and active while in the milieu)   Speech clear;coherent   Psychomotor / Gait balanced;steady   Psycho Education   Type of Intervention 1:1 intervention   Response participates, initiates socially appropriate   Hours 0.5   Treatment Detail Check-In   Activities of Daily Living   Hygiene/Grooming independent   Oral Hygiene independent   Dress independent   Laundry with supervision   Room Organization independent     Pt was sleeping in her room for the majority of the shift. Pt came out to the milieu at 1300 for meals and stayed in the milieu for the rest of the shift. While in the milieu, pt watched the [employer] game and socialized with peers and staff. Pt expressed feeling anxious, rating her anxiety a 10. Pt denied feeling depressed, but reported having SI thoughts. Pt stated she has no intent to act on her thoughts while on the unit, but she admitted to having thoughts of overdosing.

## 2019-11-18 PROCEDURE — 25000132 ZZH RX MED GY IP 250 OP 250 PS 637: Performed by: STUDENT IN AN ORGANIZED HEALTH CARE EDUCATION/TRAINING PROGRAM

## 2019-11-18 PROCEDURE — 25000132 ZZH RX MED GY IP 250 OP 250 PS 637: Performed by: PSYCHIATRY & NEUROLOGY

## 2019-11-18 PROCEDURE — H2032 ACTIVITY THERAPY, PER 15 MIN: HCPCS

## 2019-11-18 PROCEDURE — 99232 SBSQ HOSP IP/OBS MODERATE 35: CPT | Mod: GC | Performed by: PSYCHIATRY & NEUROLOGY

## 2019-11-18 PROCEDURE — 12400001 ZZH R&B MH UMMC

## 2019-11-18 RX ORDER — PRAZOSIN HYDROCHLORIDE 1 MG/1
1 CAPSULE ORAL AT BEDTIME
Status: DISCONTINUED | OUTPATIENT
Start: 2019-11-18 | End: 2019-11-19

## 2019-11-18 RX ORDER — GUANFACINE 1 MG/1
1 TABLET ORAL 2 TIMES DAILY
Status: DISCONTINUED | OUTPATIENT
Start: 2019-11-18 | End: 2019-12-17 | Stop reason: HOSPADM

## 2019-11-18 RX ADMIN — FEXOFENADINE HCL 60 MG: 60 TABLET, FILM COATED ORAL at 09:25

## 2019-11-18 RX ADMIN — PRAZOSIN HYDROCHLORIDE 1 MG: 1 CAPSULE ORAL at 20:36

## 2019-11-18 RX ADMIN — BENZTROPINE MESYLATE 1 MG: 1 TABLET ORAL at 20:49

## 2019-11-18 RX ADMIN — GABAPENTIN 800 MG: 400 CAPSULE ORAL at 14:28

## 2019-11-18 RX ADMIN — OLANZAPINE 10 MG: 10 TABLET, ORALLY DISINTEGRATING ORAL at 21:37

## 2019-11-18 RX ADMIN — Medication 1 MG: at 14:28

## 2019-11-18 RX ADMIN — NICOTINE POLACRILEX 8 MG: 4 GUM, CHEWING ORAL at 20:49

## 2019-11-18 RX ADMIN — BENZTROPINE MESYLATE 1 MG: 1 TABLET ORAL at 04:45

## 2019-11-18 RX ADMIN — GABAPENTIN 800 MG: 400 CAPSULE ORAL at 09:26

## 2019-11-18 RX ADMIN — PHENOBARBITAL 16.2 MG: 16.2 TABLET ORAL at 09:25

## 2019-11-18 RX ADMIN — PHENOBARBITAL 16.2 MG: 16.2 TABLET ORAL at 14:28

## 2019-11-18 RX ADMIN — NICOTINE POLACRILEX 8 MG: 4 GUM, CHEWING ORAL at 10:31

## 2019-11-18 RX ADMIN — QUETIAPINE FUMARATE 100 MG: 100 TABLET ORAL at 20:36

## 2019-11-18 RX ADMIN — LITHIUM CARBONATE 300 MG: 300 TABLET, EXTENDED RELEASE ORAL at 09:25

## 2019-11-18 RX ADMIN — OLANZAPINE 10 MG: 10 TABLET, ORALLY DISINTEGRATING ORAL at 17:14

## 2019-11-18 RX ADMIN — GUANFACINE 1 MG: 1 TABLET ORAL at 09:25

## 2019-11-18 RX ADMIN — MIRTAZAPINE 30 MG: 30 TABLET, FILM COATED ORAL at 20:35

## 2019-11-18 RX ADMIN — NICOTINE 1 PATCH: 14 PATCH, EXTENDED RELEASE TRANSDERMAL at 09:26

## 2019-11-18 RX ADMIN — NICOTINE POLACRILEX 4 MG: 4 GUM, CHEWING ORAL at 19:47

## 2019-11-18 RX ADMIN — NICOTINE POLACRILEX 8 MG: 4 GUM, CHEWING ORAL at 16:00

## 2019-11-18 RX ADMIN — ACETAMINOPHEN 650 MG: 325 TABLET, FILM COATED ORAL at 09:38

## 2019-11-18 RX ADMIN — NICOTINE POLACRILEX 8 MG: 4 GUM, CHEWING ORAL at 13:06

## 2019-11-18 RX ADMIN — LITHIUM CARBONATE 900 MG: 450 TABLET, EXTENDED RELEASE ORAL at 20:35

## 2019-11-18 RX ADMIN — GABAPENTIN 800 MG: 400 CAPSULE ORAL at 20:34

## 2019-11-18 ASSESSMENT — ACTIVITIES OF DAILY LIVING (ADL)
ORAL_HYGIENE: INDEPENDENT
HYGIENE/GROOMING: INDEPENDENT
DRESS: INDEPENDENT
HYGIENE/GROOMING: INDEPENDENT
ORAL_HYGIENE: INDEPENDENT
LAUNDRY: WITH SUPERVISION
DRESS: INDEPENDENT

## 2019-11-18 NOTE — PLAN OF CARE
"  Problem: Sensory Perception Impairment (Psychotic Signs/Symptoms)  Goal: Decreased Frequency and Intensity of Sensory Symptoms (Psychotic Signs/Symptoms)  Outcome: No Change: patient reported hearing screaming voices, telling her to kill self. She said has been hearing the voices \"all the time\". PRN Zyprexa was given, patient reported decreased in voices' intensity after that.       Problem: Depressive Symptoms  Goal: Depressive Symptoms  Description  Signs and symptoms of listed problems will be absent or manageable.  Outcome: Improving: patient was unable to rate depression, denied feeling sad, but said she feels \" very angry\".      Problem: Suicidal Behavior  Goal: Suicidal Behavior is Absent or Managed  Outcome: Improving: patient has not attempted any suicidal behaviors this shift. However, she said she has suicidal thoughts to cheek her medications and overdosed. Patient's mouth was checked after each med administration, she was complaint with this.      Patient continued to report SI, to overdose on medications. Said she can do it here, by cheeking her medications and overdosing (mouth checked after each med administration) Unable to rate depression, but said she feels very angry. Patient reported voices, screaming, and telling her to overdose.     Patient was able to state a goal for this hospitalization: said she wants to get off SIO tomorrow, get better, and go back home. She denied a goal for the day.     Stress: her wife asked her for divorce. Patient lives with her wife, their 3 children (age 5,12,15) and her mother-in-law. However, patient said she plans to go back home.     Patient continued to be monitored closely with 1: 1 staff at all times.     Addendum: patient continued to report high level of anxiety. Scheduled at 2000 Gabapentin was given early, Phenobarbital was also given early per pt's request. Patient appeared tired at the end on the shift, but still reporting high anxiety. Went to bed " after HS medications administration.
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
## 2019-11-18 NOTE — PROGRESS NOTES
Bouts of interrupted sleep at intervals throughout the night but w/o noted distress.  Endorsing restlessness and insomnia and requesting medication for sleep just before 0445.  Discussed meds available as well as timing for sleep med.  Medicated w/ Cogentin as ordered.  SIO staff continuously present at 10 Ft. distance for safety r/t SIO/Suicide precautions. Safe, therapeutic environment maintained.

## 2019-11-18 NOTE — PROGRESS NOTES
11/17/19 7490   Group Therapy Session   Group Attendance   (No charge)   Total Time (minutes) 12   Group Type psychotherapeutic   Psychotherapy group goals: Identify and share responses to 4 questions (fears, loves/likes, things to let go, wants).  Ayana checked in with the group. Remained for about 10 minutes and left after hearing a description of the activity without explanation.

## 2019-11-18 NOTE — PROGRESS NOTES
"Pt was visible in the milieu, tense and irritable throughout the day. Pt requested to no longer have and SIO due to a \"shy bladder\", so they could have more privacy using the bathroom. Pt reports auditory hallucinations telling her \"kill yourself\". Pt reports thoughts and a plan, although would not elaborate on the plan. Pt did not attend groups today, was anxious, and restless.        11/18/19 1435   Behavioral Health   Hallucinations auditory   Thinking distractable   Orientation person: oriented;date: oriented;place: oriented;time: oriented   Memory baseline memory   Insight poor   Judgement impaired   Eye Contact at examiner   Affect tense;irritable   Mood anxious   Physical Appearance/Attire attire appropriate to age and situation   Hygiene neglected grooming - unclean body, hair, teeth   Suicidality thoughts and plan   1. Wish to be Dead (Recent) Yes   2. Non-Specific Active Suicidal Thoughts (Recent) No   Self Injury other (see comment)  (denies)   Activity other (see comment)  (visible in the milieu )   Speech clear;coherent   Medication Sensitivity no stated side effects;no observed side effects   Psychomotor / Gait balanced;steady   Psycho Education   Type of Intervention 1:1 intervention   Response participates, initiates socially appropriate   Hours 0.5   Activities of Daily Living   Hygiene/Grooming independent   Oral Hygiene independent   Dress independent   Laundry with supervision   Room Organization independent   Activity   Activity Assistance Provided independent     "

## 2019-11-19 PROCEDURE — 25000132 ZZH RX MED GY IP 250 OP 250 PS 637: Performed by: STUDENT IN AN ORGANIZED HEALTH CARE EDUCATION/TRAINING PROGRAM

## 2019-11-19 PROCEDURE — H2032 ACTIVITY THERAPY, PER 15 MIN: HCPCS

## 2019-11-19 PROCEDURE — G0177 OPPS/PHP; TRAIN & EDUC SERV: HCPCS

## 2019-11-19 PROCEDURE — 25000132 ZZH RX MED GY IP 250 OP 250 PS 637: Performed by: PSYCHIATRY & NEUROLOGY

## 2019-11-19 PROCEDURE — 99232 SBSQ HOSP IP/OBS MODERATE 35: CPT | Mod: GC | Performed by: PSYCHIATRY & NEUROLOGY

## 2019-11-19 PROCEDURE — 12400001 ZZH R&B MH UMMC

## 2019-11-19 RX ORDER — PRAZOSIN HYDROCHLORIDE 2 MG/1
2 CAPSULE ORAL AT BEDTIME
Status: DISCONTINUED | OUTPATIENT
Start: 2019-11-19 | End: 2019-12-17 | Stop reason: HOSPADM

## 2019-11-19 RX ORDER — OLANZAPINE 5 MG/1
5 TABLET ORAL ONCE
Status: COMPLETED | OUTPATIENT
Start: 2019-11-19 | End: 2019-11-19

## 2019-11-19 RX ADMIN — OLANZAPINE 10 MG: 10 TABLET, ORALLY DISINTEGRATING ORAL at 18:56

## 2019-11-19 RX ADMIN — BENZTROPINE MESYLATE 1 MG: 1 TABLET ORAL at 17:13

## 2019-11-19 RX ADMIN — NICOTINE POLACRILEX 8 MG: 4 GUM, CHEWING ORAL at 21:13

## 2019-11-19 RX ADMIN — NICOTINE 1 PATCH: 14 PATCH, EXTENDED RELEASE TRANSDERMAL at 10:44

## 2019-11-19 RX ADMIN — MIRTAZAPINE 30 MG: 30 TABLET, FILM COATED ORAL at 21:07

## 2019-11-19 RX ADMIN — NICOTINE POLACRILEX 8 MG: 4 GUM, CHEWING ORAL at 15:46

## 2019-11-19 RX ADMIN — GABAPENTIN 800 MG: 400 CAPSULE ORAL at 19:09

## 2019-11-19 RX ADMIN — LITHIUM CARBONATE 900 MG: 450 TABLET, EXTENDED RELEASE ORAL at 19:10

## 2019-11-19 RX ADMIN — FEXOFENADINE HCL 60 MG: 60 TABLET, FILM COATED ORAL at 10:43

## 2019-11-19 RX ADMIN — PHENOBARBITAL 16.2 MG: 16.2 TABLET ORAL at 10:44

## 2019-11-19 RX ADMIN — NICOTINE POLACRILEX 8 MG: 4 GUM, CHEWING ORAL at 20:24

## 2019-11-19 RX ADMIN — Medication 1 MG: at 10:46

## 2019-11-19 RX ADMIN — NICOTINE POLACRILEX 8 MG: 4 GUM, CHEWING ORAL at 13:09

## 2019-11-19 RX ADMIN — GABAPENTIN 800 MG: 400 CAPSULE ORAL at 10:43

## 2019-11-19 RX ADMIN — PRAZOSIN HYDROCHLORIDE 2 MG: 2 CAPSULE ORAL at 19:10

## 2019-11-19 RX ADMIN — OLANZAPINE 10 MG: 10 TABLET, ORALLY DISINTEGRATING ORAL at 11:33

## 2019-11-19 RX ADMIN — NICOTINE POLACRILEX 8 MG: 4 GUM, CHEWING ORAL at 11:34

## 2019-11-19 RX ADMIN — NICOTINE POLACRILEX 8 MG: 4 GUM, CHEWING ORAL at 18:36

## 2019-11-19 RX ADMIN — Medication 1 MG: at 13:59

## 2019-11-19 RX ADMIN — GABAPENTIN 800 MG: 400 CAPSULE ORAL at 13:59

## 2019-11-19 RX ADMIN — QUETIAPINE FUMARATE 100 MG: 100 TABLET ORAL at 18:36

## 2019-11-19 RX ADMIN — PHENOBARBITAL 16.2 MG: 16.2 TABLET ORAL at 13:59

## 2019-11-19 RX ADMIN — NICOTINE POLACRILEX 8 MG: 4 GUM, CHEWING ORAL at 17:13

## 2019-11-19 RX ADMIN — LITHIUM CARBONATE 300 MG: 300 TABLET, EXTENDED RELEASE ORAL at 10:43

## 2019-11-19 RX ADMIN — OLANZAPINE 5 MG: 5 TABLET, FILM COATED ORAL at 21:06

## 2019-11-19 ASSESSMENT — ACTIVITIES OF DAILY LIVING (ADL)
HYGIENE/GROOMING: INDEPENDENT
ORAL_HYGIENE: INDEPENDENT
HYGIENE/GROOMING: INDEPENDENT
DRESS: INDEPENDENT
DRESS: INDEPENDENT
ORAL_HYGIENE: INDEPENDENT
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
## 2019-11-19 NOTE — PROGRESS NOTES
"Group Psychotherapy     Topic: Mental Illness Awareness     Hours: 1.0     Response: Patient was active in group, watched a short video on various mental health diagnoses followed by a discussion. Stated her take away was \"people can get better on medicine.\"     Facilitator: Joana Marroquin, LPCC, LADC  "

## 2019-11-19 NOTE — PROGRESS NOTES
Pt was having a good and calm shift until she got a phone call from her wife. Pt pounded the phone after her wife told her that they were going to get a divorce, hung up the phone afterwards, went into her room and pounded the wall several times. Pt was upset and agitated, saying 'I am losing my shit!'. This writer asked the pt try to walk her rage off and pt agreed, paced the hallway before getting her night meds. Pt said she was feeling suicidal but agreed to come to staff if she felt like carrying it out. Pt also agreed not to engage in SIB. Pt managed to calm down after 20 minutes, sit at the lounge and shared her feelings with peers. After that, Pt came to this writer and asked for a check in. Pt told this writer she was frustrated because she and her wife had previously agreed to put their divorce on hold but the wife called to take her word back. Pt stated that she feared she would be nothing without her. This writer suggested the pt to counter her irrational belief by looking at how she survived the past 20+ years just fine before meeting her wife. Pt proceeded to share more history about her and her wife and came to realize that she was the only one who was trying to keep the relationship together. This writer suggested pt to try to accept things that she had no control over such as forcing someone to love herself. Pt then told this writer that she had decided to let her go, because her wife was obviously tearing her life apart. Pt stated that she was going to view this divorce like a family bereavement, that way she would stop thinking about what she could do to win her back. Pt called her wife after that and she said she will visit pt on this coming Thursday to say goodbye (if pt is still here at that time). Pt also told this writer she is going to live with her parents after she got discharged. Denied any forms of hallucination, SIB and HI.        11/18/19 2200   Behavioral Health   Hallucinations denies  / not responding to hallucinations   Thinking distractable   Orientation person: oriented;place: oriented   Memory baseline memory   Insight poor   Judgement impaired   Eye Contact at examiner   Affect full range affect;tense;angry   Mood mood is calm;labile;anxious   Physical Appearance/Attire disheveled   Hygiene neglected grooming - unclean body, hair, teeth   Suicidality thoughts only   Psycho Education   Type of Intervention 1:1 intervention   Response participates, initiates socially appropriate   Hours 0.5   Treatment Detail check in   Activities of Daily Living   Hygiene/Grooming independent   Oral Hygiene independent   Dress independent   Room Organization independent

## 2019-11-19 NOTE — PROGRESS NOTES
Pt visible in milieu, watching tv in the lounge, observed talking on the phone.  Didn't attend any groups.  Fairly appropriate.         11/19/19 1400   Behavioral Health   Hallucinations denies / not responding to hallucinations   Thinking other (see comment)  (neutral)   Orientation person: oriented;place: oriented   Memory baseline memory   Insight poor   Judgement impaired   Eye Contact at examiner   Affect full range affect   Mood anxious   Physical Appearance/Attire attire appropriate to age and situation   Hygiene other (see comment)  (fair)   Suicidality other (see comments)  (DIANNA)   1. Wish to be Dead (Recent)   (DIANNA)   2. Non-Specific Active Suicidal Thoughts (Recent)   (DIANNA)   Self Injury other (see comment)  (DIANNA)   Activity other (see comment)  (present in milieu)   Speech clear;coherent   Medication Sensitivity no observed side effects   Psychomotor / Gait balanced;steady   Psycho Education   Type of Intervention 1:1 intervention   Response observes from a distance   Activities of Daily Living   Hygiene/Grooming independent   Oral Hygiene independent   Dress independent   Room Organization independent

## 2019-11-19 NOTE — PROGRESS NOTES
11/18/19 8240   General Information   Art Directive other (see comments)   AT directive was to create an image of self as a landscape, using features of landscapes as metaphors for self and current feelings/situation. Goals of directive: emotional expression, identifying personal strengths and goals, creating a personal self narrative. Pt was a positive participant, focused on task for the full duration of group.  Pt explored various art media and techniques and was engaged in process more then in previous author's groups. Pt created an image of a waterfall in a wooded landscape. Pt chose not to share with author or group at this time. Pts mood was calm.

## 2019-11-20 PROCEDURE — 25000132 ZZH RX MED GY IP 250 OP 250 PS 637: Performed by: STUDENT IN AN ORGANIZED HEALTH CARE EDUCATION/TRAINING PROGRAM

## 2019-11-20 PROCEDURE — 12400001 ZZH R&B MH UMMC

## 2019-11-20 PROCEDURE — H2032 ACTIVITY THERAPY, PER 15 MIN: HCPCS

## 2019-11-20 PROCEDURE — 25000132 ZZH RX MED GY IP 250 OP 250 PS 637: Performed by: PSYCHIATRY & NEUROLOGY

## 2019-11-20 PROCEDURE — 90837 PSYTX W PT 60 MINUTES: CPT

## 2019-11-20 PROCEDURE — 99232 SBSQ HOSP IP/OBS MODERATE 35: CPT | Mod: GC | Performed by: PSYCHIATRY & NEUROLOGY

## 2019-11-20 RX ORDER — FLUPHENAZINE HYDROCHLORIDE 5 MG/ML
2 SOLUTION, CONCENTRATE ORAL EVERY 6 HOURS PRN
Status: DISCONTINUED | OUTPATIENT
Start: 2019-11-20 | End: 2019-11-20

## 2019-11-20 RX ORDER — FLUPHENAZINE HYDROCHLORIDE 5 MG/ML
2 SOLUTION, CONCENTRATE ORAL 2 TIMES DAILY PRN
Status: DISCONTINUED | OUTPATIENT
Start: 2019-11-20 | End: 2019-11-25

## 2019-11-20 RX ORDER — OLANZAPINE 10 MG/2ML
10 INJECTION, POWDER, FOR SOLUTION INTRAMUSCULAR EVERY 4 HOURS PRN
Status: DISCONTINUED | OUTPATIENT
Start: 2019-11-20 | End: 2019-11-26

## 2019-11-20 RX ADMIN — GABAPENTIN 800 MG: 400 CAPSULE ORAL at 19:41

## 2019-11-20 RX ADMIN — NICOTINE 1 PATCH: 14 PATCH, EXTENDED RELEASE TRANSDERMAL at 08:34

## 2019-11-20 RX ADMIN — NICOTINE POLACRILEX 8 MG: 4 GUM, CHEWING ORAL at 19:56

## 2019-11-20 RX ADMIN — LITHIUM CARBONATE 300 MG: 300 TABLET, EXTENDED RELEASE ORAL at 08:35

## 2019-11-20 RX ADMIN — NICOTINE POLACRILEX 8 MG: 4 GUM, CHEWING ORAL at 09:56

## 2019-11-20 RX ADMIN — FLUPHENAZINE HYDROCHLORIDE 2 MG: 5 SOLUTION, CONCENTRATE ORAL at 21:48

## 2019-11-20 RX ADMIN — OLANZAPINE 10 MG: 10 TABLET, ORALLY DISINTEGRATING ORAL at 11:51

## 2019-11-20 RX ADMIN — NICOTINE POLACRILEX 8 MG: 4 GUM, CHEWING ORAL at 13:46

## 2019-11-20 RX ADMIN — Medication 1 MG: at 08:35

## 2019-11-20 RX ADMIN — NICOTINE POLACRILEX 8 MG: 4 GUM, CHEWING ORAL at 21:35

## 2019-11-20 RX ADMIN — PRAZOSIN HYDROCHLORIDE 2 MG: 2 CAPSULE ORAL at 21:32

## 2019-11-20 RX ADMIN — MIRTAZAPINE 30 MG: 30 TABLET, FILM COATED ORAL at 21:32

## 2019-11-20 RX ADMIN — NICOTINE POLACRILEX 4 MG: 4 GUM, CHEWING ORAL at 08:47

## 2019-11-20 RX ADMIN — NICOTINE POLACRILEX 8 MG: 4 GUM, CHEWING ORAL at 12:40

## 2019-11-20 RX ADMIN — GABAPENTIN 800 MG: 400 CAPSULE ORAL at 13:46

## 2019-11-20 RX ADMIN — BENZTROPINE MESYLATE 1 MG: 1 TABLET ORAL at 11:51

## 2019-11-20 RX ADMIN — BENZOCAINE, MENTHOL 1 LOZENGE: 15; 3.6 LOZENGE ORAL at 21:57

## 2019-11-20 RX ADMIN — QUETIAPINE FUMARATE 100 MG: 100 TABLET ORAL at 19:41

## 2019-11-20 RX ADMIN — GABAPENTIN 800 MG: 400 CAPSULE ORAL at 08:35

## 2019-11-20 RX ADMIN — FEXOFENADINE HCL 60 MG: 60 TABLET, FILM COATED ORAL at 08:35

## 2019-11-20 RX ADMIN — NICOTINE POLACRILEX 8 MG: 4 GUM, CHEWING ORAL at 17:19

## 2019-11-20 RX ADMIN — Medication 1 MG: at 13:46

## 2019-11-20 RX ADMIN — LITHIUM CARBONATE 900 MG: 450 TABLET, EXTENDED RELEASE ORAL at 19:41

## 2019-11-20 RX ADMIN — FLUPHENAZINE HYDROCHLORIDE 2 MG: 5 SOLUTION, CONCENTRATE ORAL at 17:14

## 2019-11-20 ASSESSMENT — ACTIVITIES OF DAILY LIVING (ADL)
ORAL_HYGIENE: INDEPENDENT
DRESS: STREET CLOTHES;INDEPENDENT
HYGIENE/GROOMING: INDEPENDENT

## 2019-11-20 NOTE — PLAN OF CARE
"   Patient flat much of shift, but showing some animation in brief interactions.   Patient had one episode latter part of shift in which she asked to see the doctor, telling writer that  \"The voices are really bad right now.  They make me want to kill myself right now.\"   Pt received one time prn to good effect.   Pt reports feeling safe before she retired for evening.  "

## 2019-11-20 NOTE — PROGRESS NOTES
Re: Discharge planning    Patient has phone interview with Koki at [facility] on Friday 11/22/19 at 11am. [facility] is several weeks out, patient is added to wait list.    Her  with [facility] () will plan to meet patient on Tuesday.    Joana Marroquin, BORISC, [facility]  Clinical Treatment Coordinator

## 2019-11-20 NOTE — PROGRESS NOTES
"Pt was observed present in the milieu throughout the evening socializing with peers, coloring, attending an OT group, and eating meals. Pt approached nurse later in the evening and began requesting PRNs for anxiety, the cause of their anxiety they were unable to identify. Pt stated that they were feeling suicidal but after PRN medication began to work pt stated that their thoughts were \"getting less\". Pt remained in the lounge throughout the rest of the evening.     11/19/19 1592   Behavioral Health   Hallucinations denies / not responding to hallucinations   Thinking distractable   Orientation person: oriented;place: oriented   Memory baseline memory   Insight poor   Judgement impaired   Eye Contact at examiner   Affect full range affect   Mood anxious   Physical Appearance/Attire attire appropriate to age and situation   Hygiene neglected grooming - unclean body, hair, teeth   Suicidality thoughts only   1. Wish to be Dead (Recent) Yes   2. Non-Specific Active Suicidal Thoughts (Recent) Yes   Self Injury thoughts only   Elopement   (None stated or observed)   Activity   (Present in milieu)   Speech clear;coherent   Medication Sensitivity no stated side effects;no observed side effects   Psychomotor / Gait balanced;steady   Psycho Education   Type of Intervention 1:1 intervention   Response participates with encouragement   Hours 0.5   Treatment Detail Check-in   Activities of Daily Living   Hygiene/Grooming independent   Oral Hygiene independent   Dress independent   Room Organization independent     "

## 2019-11-20 NOTE — PROGRESS NOTES
CLINICAL NUTRITION SERVICES - REASSESSMENT NOTE     Nutrition Prescription    RECOMMENDATIONS FOR MDs/PROVIDERS TO ORDER:  None at this time     Malnutrition Status:    Patient does not meet two of the criteria necessary for diagnosing malnutrition     Recommendations already ordered by Registered Dietitian (RD):  - Continue Propel Zero TID  - Order Jello with lunch instead of 2PM     EVALUATION OF THE PROGRESS TOWARD GOALS   Diet: Regular, Propel Zero TID with meals, 2 PM - Jello  Intake: Pt reports that her appetite is good and that she is eating 100% of TID meals. She has been drinking TID Propel and likes the Jello but would like it to come with lunch instead of as a snack.        NEW FINDINGS   Over the last week, the pt has lost 2# (1%).   Wt Readings from Last 1 Encounters:   11/14/19 92.4 kg (203 lb 11.2 oz)     MALNUTRITION  % Intake: No decreased intake noted  % Weight Loss: Weight loss does not meet criteria  Subcutaneous Fat Loss: None observed  Muscle Loss: None observed  Fluid Accumulation/Edema: None noted  Malnutrition Diagnosis: Patient does not meet two of the above criteria necessary for diagnosing malnutrition    Previous Goals   Patient to consume % of nutritionally adequate meal trays TID, or the equivalent with supplements/snacks.  Evaluation: Met    Previous Nutrition Diagnosis  Predicted excessive nutrient intake related to increased hunger as evidenced by pt report with increased weight trends since admission to facility    Evaluation: Improvement     CURRENT NUTRITION DIAGNOSIS  No nutrition diagnosis at this time    INTERVENTIONS  Implementation  Modify composition of meals/snacks    Monitoring/Evaluation  No nutrition follow-up warranted at this time. RD to sign off. Please consult if further needs arise.       Lauryn Simmons RD, LD  Pager: [phone]

## 2019-11-21 PROCEDURE — 12400001 ZZH R&B MH UMMC

## 2019-11-21 PROCEDURE — 25000132 ZZH RX MED GY IP 250 OP 250 PS 637: Performed by: STUDENT IN AN ORGANIZED HEALTH CARE EDUCATION/TRAINING PROGRAM

## 2019-11-21 PROCEDURE — 99232 SBSQ HOSP IP/OBS MODERATE 35: CPT | Mod: GC | Performed by: PSYCHIATRY & NEUROLOGY

## 2019-11-21 PROCEDURE — G0177 OPPS/PHP; TRAIN & EDUC SERV: HCPCS

## 2019-11-21 PROCEDURE — 25000132 ZZH RX MED GY IP 250 OP 250 PS 637: Performed by: PSYCHIATRY & NEUROLOGY

## 2019-11-21 PROCEDURE — 90853 GROUP PSYCHOTHERAPY: CPT

## 2019-11-21 PROCEDURE — 25000128 H RX IP 250 OP 636: Performed by: STUDENT IN AN ORGANIZED HEALTH CARE EDUCATION/TRAINING PROGRAM

## 2019-11-21 RX ADMIN — PRAZOSIN HYDROCHLORIDE 2 MG: 2 CAPSULE ORAL at 21:21

## 2019-11-21 RX ADMIN — LITHIUM CARBONATE 300 MG: 300 TABLET, EXTENDED RELEASE ORAL at 09:23

## 2019-11-21 RX ADMIN — FEXOFENADINE HCL 60 MG: 60 TABLET, FILM COATED ORAL at 09:22

## 2019-11-21 RX ADMIN — MIRTAZAPINE 30 MG: 30 TABLET, FILM COATED ORAL at 21:21

## 2019-11-21 RX ADMIN — NICOTINE 1 PATCH: 14 PATCH, EXTENDED RELEASE TRANSDERMAL at 09:22

## 2019-11-21 RX ADMIN — Medication 1 MG: at 13:21

## 2019-11-21 RX ADMIN — FLUPHENAZINE HYDROCHLORIDE 2 MG: 5 SOLUTION, CONCENTRATE ORAL at 10:56

## 2019-11-21 RX ADMIN — NICOTINE POLACRILEX 8 MG: 4 GUM, CHEWING ORAL at 09:50

## 2019-11-21 RX ADMIN — GABAPENTIN 800 MG: 400 CAPSULE ORAL at 13:21

## 2019-11-21 RX ADMIN — QUETIAPINE FUMARATE 100 MG: 100 TABLET ORAL at 21:21

## 2019-11-21 RX ADMIN — NICOTINE POLACRILEX 8 MG: 4 GUM, CHEWING ORAL at 13:21

## 2019-11-21 RX ADMIN — FLUPHENAZINE HYDROCHLORIDE 2 MG: 5 SOLUTION, CONCENTRATE ORAL at 14:43

## 2019-11-21 RX ADMIN — Medication 1 MG: at 09:24

## 2019-11-21 RX ADMIN — GABAPENTIN 800 MG: 400 CAPSULE ORAL at 09:23

## 2019-11-21 RX ADMIN — ONDANSETRON HYDROCHLORIDE 4 MG: 4 TABLET, FILM COATED ORAL at 14:44

## 2019-11-21 RX ADMIN — NICOTINE POLACRILEX 8 MG: 4 GUM, CHEWING ORAL at 18:06

## 2019-11-21 RX ADMIN — LITHIUM CARBONATE 900 MG: 450 TABLET, EXTENDED RELEASE ORAL at 21:21

## 2019-11-21 RX ADMIN — GABAPENTIN 800 MG: 400 CAPSULE ORAL at 21:21

## 2019-11-21 ASSESSMENT — ACTIVITIES OF DAILY LIVING (ADL)
ORAL_HYGIENE: INDEPENDENT
DRESS: INDEPENDENT;STREET CLOTHES
DRESS: INDEPENDENT
LAUNDRY: WITH SUPERVISION
ORAL_HYGIENE: INDEPENDENT
HYGIENE/GROOMING: INDEPENDENT
HYGIENE/GROOMING: INDEPENDENT

## 2019-11-21 NOTE — PROGRESS NOTES
Ayana   attended 1 of 3 OT groups today. Attended OT group this morning; requested to use her own supplies rather than explore the new project presented. Bright affect, no other changes noted.      11/21/19 1500   Occupational Therapy   Type of Intervention structured groups   Response Initiates, socially acceptable   Hours 1

## 2019-11-21 NOTE — PROGRESS NOTES
Pt was present and social in the milieu throughout most of the shift. Pt was observed socializing with peers, watching TV, eating snack/dinner, coloring, and listening to music. Pt presented with a full range affect and remained calm throughout the evening, albeit frequently appearing anxious. Pt endorsed being suicidal and stated she had a plan but would not expound on this. However, pt denied being depressed and contracted for safety with this .        11/20/19 2401   Behavioral Health   Hallucinations denies / not responding to hallucinations   Thinking   (appears intact)   Orientation person: oriented;place: oriented   Memory   (appears baseline)   Insight poor   Judgement impaired   Eye Contact at examiner   Affect full range affect   Mood anxious;mood is calm   Physical Appearance/Attire attire appropriate to age and situation   Hygiene   (adequate)   Suicidality thoughts and plan;safety plan   Self Injury plan;safety plan   Elopement   (no apparent risk)   Activity   (pt present in milieu)   Speech clear;coherent   Medication Sensitivity no stated side effects;no observed side effects   Psychomotor / Gait balanced;steady   Psycho Education   Type of Intervention 1:1 intervention   Response participates, initiates socially appropriate   Hours 0.5   Treatment Detail   (check-in)   Activities of Daily Living   Hygiene/Grooming independent   Oral Hygiene independent   Dress street clothes;independent   Laundry   (pt did not do laundry)   Room Organization independent   Activity   Activity Assistance Provided independent

## 2019-11-21 NOTE — PROGRESS NOTES
"Pt was anxious about life circumstances and welcomed an individual verbal psychotherapy after a group dance/movement therapy session.  She explained the challenges of \"everything being up in the air\" and could not see that her window of tolerance for this discomfort had increased.  She could see her improvement after given some examples and relative behaviors from her recent history as well as behaviors of peers on the unit.  She expressed some regret about her relationship, but also greater acceptance about the loss.     Once again, she needed to modulate between lighter, music-based engagement, and the themes that came out of her music choices that reflected her current and historic struggles. This was self-regulated and appropriate.         11/20/19 1230   Dance Movement Therapy   Type of Intervention 1:1 intervention   Response participates with encouragement      "

## 2019-11-21 NOTE — PROGRESS NOTES
Pt was anxiously awaiting being called in to a meeting with his treatment team but it didn't happen during the session.  In fact, a code for another pt caused a hold-in-place in the group room so it turned into an extended session and patient became more open to DMT interventions.  Pt stood and danced for the first time in all group and individual sessions.  See additional note on individual session after group.       11/20/19 1130   Dance Movement Therapy   Type of Intervention structured groups   Response participates with encouragement   Hours 1

## 2019-11-21 NOTE — PLAN OF CARE
"Ayana today made list of four key items she could work on to help her move towards more independent and satisfactory life-also listed steps she can take to begin to achieve each goal-initially motivated to take these steps because father had told her she needed to plan what she would be doing while she was staying with them waiting for placement, if parents were to allow her to come-Ayana very upset post phone call with dad this afternoon because he said he \"is still on the fence\" whether she can come and stay with parents or not-Ayana has been missing family and feels very hurt she likely will not be with family for Thanksgiving holiday-she stated she felt very angry, like punching the walls, and screaming angrily-Ayana, however, did not engage in usual angry and aggessive responses, but talked through her emotions and considered benefits of more constructive responses-Able to recognize how pattern of angry responses would make it difficult and uncomfortable for others to be around her-constructive phone conversation with wife over telephone-Ayana continues SI-states she does not want to say what plan is because she does not want to be place on SIO-does appear to enjoy interactions with others-does not want to go to [facility], but at same states she is anxious for next phase of tx    "

## 2019-11-21 NOTE — PROGRESS NOTES
"Pt was overall less anxious today, attended groups. While in group pt became overwhelmed with thoughts about wife and parents, so left group. Pt left group and sought assistance of staff to use cognitive coping techniques. Was able to complete with minimal assistance from staff. Pt's mood brightened when they learned their parents agreed to allow her to stay with them. Pt continues to have thoughts of suicide, when asked questioned staff \"Do I have to answer?\". Pt was social with peers, visible in the milieu.      11/21/19 1353   Behavioral Health   Hallucinations denies / not responding to hallucinations   Thinking distractable   Orientation person: oriented;place: oriented;date: oriented;time: oriented   Memory baseline memory   Insight poor   Judgement impaired   Eye Contact at examiner   Affect full range affect   Mood anxious   Physical Appearance/Attire attire appropriate to age and situation   Hygiene neglected grooming - unclean body, hair, teeth   Suicidality thoughts and plan   1. Wish to be Dead (Recent) Yes   2. Non-Specific Active Suicidal Thoughts (Recent) Yes   Self Injury thoughts only   Elopement Statements about wanting to leave   Activity other (see comment)  (visible in milieu )   Speech clear;coherent   Medication Sensitivity no stated side effects;no observed side effects   Psychomotor / Gait balanced;steady   Activities of Daily Living   Hygiene/Grooming independent   Oral Hygiene independent   Dress independent;street clothes   Laundry with supervision   Room Organization independent   Activity   Activity Assistance Provided independent     "

## 2019-11-21 NOTE — PROGRESS NOTES
" 11/20/19 2100   Art Therapy   Type of Intervention structured groups   Response Participated with encouragement    Hours 1   Treatment Detail    Art Therapy- heart map   Goal- cope, regulate and express through Art Therapy directive     Outcome- pt attended the Art Therapy group for the full group time.  She said in her heart was \" tension.\" Her feeling word was \" fine\". Writer encouraged her to look at tension vs calm in her heart. She experimented with imagery of hands holding a tennis ball,. This is a frequent image. She is proud of her tennis accomplishments but says she has physical limitations, injury and weight she expresses. She was self critical of that drawing. On her own time she worked on a second drawing that showed a small sketch of her with the top of her head off. She said it was her \" losing her mind.\" She expresses gratitude that writer meets with her 1:1 and the dance therapist does as well. She appreciates the individualized attention. She said she was doing better earlier in the day when she met with dance therapist. She said later she had a good conversation with wife, because she stayed calm. She said a conversation with father was confusing and also that the [facility] program wasn't welcoming her back, she started talking about not wanting to be alive . Writer encouraged positive thinking. Writer asked if she wanted to get better and she said \" actually not really.\" Writer encouraged her to do some art looking at what it would look like to get better.\" She agreed to do this before writer sees her 1:1 next.            "
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
## 2019-11-22 ENCOUNTER — VIRTUAL VISIT (OUTPATIENT)
Dept: PSYCHIATRY | Facility: CLINIC | Age: 29
End: 2019-11-22
Payer: COMMERCIAL

## 2019-11-22 DIAGNOSIS — F25.0 SCHIZOAFFECTIVE DISORDER, BIPOLAR TYPE (H): Primary | ICD-10-CM

## 2019-11-22 PROCEDURE — 90837 PSYTX W PT 60 MINUTES: CPT

## 2019-11-22 PROCEDURE — 12400001 ZZH R&B MH UMMC

## 2019-11-22 PROCEDURE — 25000132 ZZH RX MED GY IP 250 OP 250 PS 637: Performed by: PSYCHIATRY & NEUROLOGY

## 2019-11-22 PROCEDURE — 25000132 ZZH RX MED GY IP 250 OP 250 PS 637: Performed by: STUDENT IN AN ORGANIZED HEALTH CARE EDUCATION/TRAINING PROGRAM

## 2019-11-22 PROCEDURE — G0177 OPPS/PHP; TRAIN & EDUC SERV: HCPCS

## 2019-11-22 PROCEDURE — 99232 SBSQ HOSP IP/OBS MODERATE 35: CPT | Mod: GC | Performed by: PSYCHIATRY & NEUROLOGY

## 2019-11-22 RX ORDER — BUSPIRONE HYDROCHLORIDE 10 MG/1
10 TABLET ORAL 2 TIMES DAILY
Status: DISCONTINUED | OUTPATIENT
Start: 2019-11-22 | End: 2019-12-03

## 2019-11-22 RX ADMIN — PRAZOSIN HYDROCHLORIDE 2 MG: 2 CAPSULE ORAL at 21:26

## 2019-11-22 RX ADMIN — NICOTINE POLACRILEX 8 MG: 4 GUM, CHEWING ORAL at 14:45

## 2019-11-22 RX ADMIN — Medication 1 MG: at 10:23

## 2019-11-22 RX ADMIN — LITHIUM CARBONATE 300 MG: 300 TABLET, EXTENDED RELEASE ORAL at 10:22

## 2019-11-22 RX ADMIN — Medication 1 MG: at 14:45

## 2019-11-22 RX ADMIN — FLUPHENAZINE HYDROCHLORIDE 2 MG: 5 SOLUTION, CONCENTRATE ORAL at 16:14

## 2019-11-22 RX ADMIN — GABAPENTIN 800 MG: 400 CAPSULE ORAL at 10:23

## 2019-11-22 RX ADMIN — LITHIUM CARBONATE 900 MG: 450 TABLET, EXTENDED RELEASE ORAL at 21:25

## 2019-11-22 RX ADMIN — BUSPIRONE HYDROCHLORIDE 10 MG: 10 TABLET ORAL at 12:48

## 2019-11-22 RX ADMIN — NICOTINE 1 PATCH: 14 PATCH, EXTENDED RELEASE TRANSDERMAL at 10:23

## 2019-11-22 RX ADMIN — GABAPENTIN 800 MG: 400 CAPSULE ORAL at 14:45

## 2019-11-22 RX ADMIN — NICOTINE POLACRILEX 8 MG: 4 GUM, CHEWING ORAL at 20:02

## 2019-11-22 RX ADMIN — BUSPIRONE HYDROCHLORIDE 10 MG: 10 TABLET ORAL at 16:14

## 2019-11-22 RX ADMIN — NICOTINE POLACRILEX 8 MG: 4 GUM, CHEWING ORAL at 21:25

## 2019-11-22 RX ADMIN — FEXOFENADINE HCL 60 MG: 60 TABLET, FILM COATED ORAL at 10:23

## 2019-11-22 RX ADMIN — MIRTAZAPINE 30 MG: 30 TABLET, FILM COATED ORAL at 21:25

## 2019-11-22 RX ADMIN — NICOTINE POLACRILEX 8 MG: 4 GUM, CHEWING ORAL at 10:34

## 2019-11-22 RX ADMIN — BENZTROPINE MESYLATE 1 MG: 1 TABLET ORAL at 22:38

## 2019-11-22 RX ADMIN — NICOTINE POLACRILEX 8 MG: 4 GUM, CHEWING ORAL at 16:16

## 2019-11-22 RX ADMIN — BENZTROPINE MESYLATE 1 MG: 1 TABLET ORAL at 17:54

## 2019-11-22 RX ADMIN — GABAPENTIN 800 MG: 400 CAPSULE ORAL at 21:25

## 2019-11-22 RX ADMIN — QUETIAPINE FUMARATE 100 MG: 100 TABLET ORAL at 21:26

## 2019-11-22 RX ADMIN — NICOTINE POLACRILEX 8 MG: 4 GUM, CHEWING ORAL at 12:48

## 2019-11-22 RX ADMIN — FLUPHENAZINE HYDROCHLORIDE 2 MG: 5 SOLUTION, CONCENTRATE ORAL at 18:41

## 2019-11-22 RX ADMIN — NICOTINE POLACRILEX 8 MG: 4 GUM, CHEWING ORAL at 16:13

## 2019-11-22 ASSESSMENT — ACTIVITIES OF DAILY LIVING (ADL)
ORAL_HYGIENE: INDEPENDENT
DRESS: INDEPENDENT
DRESS: INDEPENDENT;STREET CLOTHES
HYGIENE/GROOMING: INDEPENDENT
LAUNDRY: WITH SUPERVISION
HYGIENE/GROOMING: INDEPENDENT
ORAL_HYGIENE: INDEPENDENT
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
## 2019-11-22 NOTE — PROGRESS NOTES
"   11/21/19 4910   Group Therapy Session   Group Attendance attended group session   Total Time (minutes) 45   Group Type psychotherapeutic   Group Topic Covered other (see comments)   Patient Participation/Contribution cooperative with task;discussed personal experience with topic;listened actively   Psychotherapy goal: Self-reflection through the use of the \"DBT House\" activity.    Ayana presented in a bright mood this evening during group. They engaged in the activity and processed with the group. Ayana listed their values as \"honesty, respect, reliable, honor.\" They get support from family and friends. They reported wanting more \"love and escobar\" in life. Goals include: discharging from the hospital, the holidays, a new start in life, happiness, making parents proud, making self proud and becoming more independent.  Ayana \"blows off steam\" by listening to \"music, reading, coloring, Pokemon, Nintendo switch.\" They reported feeling proud of becoming independent, tennis skills, & BA degree.\"    Ayana remained in group the full time and demonstrated positive social interactions.  "

## 2019-11-22 NOTE — PROGRESS NOTES
Pt visible in milieu, social with select peers, attended some groups.  Anxious.  Ate lunch.  Neglected grooming.       11/22/19 1400   Behavioral Health   Hallucinations denies / not responding to hallucinations   Thinking other (see comment)  (neutral)   Orientation person: oriented;place: oriented   Memory baseline memory   Insight admits / accepts   Judgement impaired   Eye Contact at examiner   Affect full range affect   Mood anxious   Physical Appearance/Attire attire appropriate to age and situation   Hygiene neglected grooming - unclean body, hair, teeth   Suicidality other (see comments)  (none observed)   1. Wish to be Dead (Recent) No   2. Non-Specific Active Suicidal Thoughts (Recent) No   Self Injury other (see comment)  (none observed)   Activity other (see comment)  (present in milieu)   Speech clear;coherent   Medication Sensitivity no observed side effects   Psychomotor / Gait balanced;steady   Occupational Therapy   Type of Intervention structured groups   Response Initiates, socially acceptable   Hours 2   Psycho Education   Type of Intervention 1:1 intervention   Response observes from a distance   Activities of Daily Living   Hygiene/Grooming independent   Oral Hygiene independent   Dress independent;street clothes   Room Organization independent

## 2019-11-22 NOTE — PROGRESS NOTES
Pt worked through frustration with changes in outpatient services, as well as uncertainty surrounding discharge plans and inpatient treatment team changes.  Therapy focused on what is within patient control and taking advantage of clinical services offered in the hospital.  Pt appeared anxious but self-regulated.  Mood improved when movement throughout the unit was added to verbal sharing.    This therapist coordinated services with new CTC and provider; will follow up again before 1:1 psychotherapy resumes with pt next week.         11/22/19 1500   Dance Movement Therapy   Type of Intervention 1:1 intervention   Response participates with encouragement

## 2019-11-22 NOTE — PROGRESS NOTES
Pt was calm and pleasant for the first half of shift. Pt reported she was doing really well until all the anxiety and anger built up to a point that she couldn't ignore them anymore. Overwhelmed by negative emotions at the moment, Pt went into her room and punched the wall once but did not make any scene out of it. Pt came to this writer later describing what happened and said that she would like to talk. Pt endorsed feeling intense level of anxiety because on top of the everything that she was going through, she told her treatment team that she was going to sabotage her interview with the residential program tomorrow. Pt said she did that because she really need to spend some time with her family prior to entering the program but she wasn't sure if the doctors of the court were going to allow that. Pt proceeded to tell this writer about her wife and her scheduled visit tomorrow. Pt was agreeable and receptive towards this writer's advice. Pt agreed that her wife was ruining her life but she just lacked the strength and determination to endure all the discomfort and anxiety of getting divorce. Pt stated that she felt so much better after talking to this writer, felt remorseful about hitting the wall and agreed to come to staff first next time. Pt also reported feeling embarrassed of her self-injuring behavior on hindsight. Pt endorsed feeling suicidal but denied any forms of hallucinations, SIB (other than the punch) and HI.         11/21/19 2200   Behavioral Health   Hallucinations denies / not responding to hallucinations   Thinking distractable   Orientation person: oriented;place: oriented;date: oriented   Memory baseline memory   Insight admits / accepts   Judgement impaired   Eye Contact at examiner   Affect tense;blunted, flat;sad   Mood anxious   Physical Appearance/Attire attire appropriate to age and situation   Hygiene neglected grooming - unclean body, hair, teeth   1. Wish to be Dead (Recent) No   2.  Non-Specific Active Suicidal Thoughts (Recent) No   Self Injury   (punched the wall once tonight)   Elopement   (none stated or observed)   Activity   (appropriate)   Speech clear;coherent   Medication Sensitivity no stated side effects;no observed side effects   Psychomotor / Gait balanced;steady   Psycho Education   Type of Intervention 1:1 intervention   Response participates, initiates socially appropriate   Hours 0.5   Treatment Detail check in   Activities of Daily Living   Hygiene/Grooming independent   Oral Hygiene independent   Dress independent   Room Organization independent

## 2019-11-22 NOTE — PROGRESS NOTES
Re: Case management    Patient was assigned  Emily Rodriguez with [facility]. Direct dial: [phone]    CINTHYA Mckoy, [facility]  Clinical Treatment Coordinator

## 2019-11-22 NOTE — PROGRESS NOTES
"Ayana  attended 2 of 3 OT groups today. Despondent, flat.   Reported feeling bored during OT clinic this a.m. and appeared somewhat interested in working on organization projects; did not sustain interest for longer than 10 minutes on any of these.   This afternoon she attended a process group on the topic of kindness. Identified two of her most valued qualities as \"empathetic & witty.\" Mostly quiet & withdrawn.      11/22/19 1400   Occupational Therapy   Type of Intervention structured groups   Response Initiates, socially acceptable   Hours 2         "

## 2019-11-22 NOTE — PROGRESS NOTES
Per verbal report of team psychiatry resident when patient met with attending and resident she said that when [facility] called was told they are not a DBT program and thus Ayana did not proceed with the phone interview as scheduled and planned while still with primary [facility].    At this time per CTC report for transfer patient has only been referred to [facility] and is on [facility] list.     Per psychiatry resident there has been discussion of allowing patient to go stay with her family near [facility].   This writer clarified to provider that TCM from [facility] will not be able to travel to [facility] in order to ensure she is following terms of commitment. Transfer of a commitment to [facility] is not an option as patient has not lived there in some time and [place].     Monday primary attending for Blue [facility] Dr. Willson will return and as a team we will discuss patient care and plan as she is newly transferred to this team on day 30 of hospitalization.     This writer is coordinating with green team CTC in order to become up to date on case.
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
## 2019-11-22 NOTE — PLAN OF CARE
BEHAVIORAL TEAM DISCUSSION    Participants:   Dr. Joao Morin, Attending  Sunni Clark, PGY-1  Progress: Patient is off one to one. Interviewed for IRTS  Anticipated length of stay: Undetermined  Continued Stay Criteria/Rationale: Committed with [facility]  Medical/Physical: No acute issues  Precautions:   Behavioral Orders   Procedures    Code 1 - Restrict to Unit    Elopement precautions    Routine Programming     As clinically indicated    Self Injury Precaution    Status 15     Every 15 minutes.    Suicide precautions     Patients on Suicide Precautions should have a Combination Diet ordered that includes a Diet selection(s) AND a Behavioral Tray selection for Safe Tray - with utensils, or Safe Tray - NO utensils     Plan: Patient had interview with [facility], dispo planning pending interview outcome. Patient will need new mental health professionals, [facility] team is recommending DBT programming.  Rationale for change in precautions or plan: No change at present-will continue to monitor and adjust as needed

## 2019-11-23 PROCEDURE — 25000128 H RX IP 250 OP 636: Performed by: STUDENT IN AN ORGANIZED HEALTH CARE EDUCATION/TRAINING PROGRAM

## 2019-11-23 PROCEDURE — 12400001 ZZH R&B MH UMMC

## 2019-11-23 PROCEDURE — 25000132 ZZH RX MED GY IP 250 OP 250 PS 637: Performed by: STUDENT IN AN ORGANIZED HEALTH CARE EDUCATION/TRAINING PROGRAM

## 2019-11-23 PROCEDURE — 25000132 ZZH RX MED GY IP 250 OP 250 PS 637: Performed by: PSYCHIATRY & NEUROLOGY

## 2019-11-23 RX ADMIN — NICOTINE POLACRILEX 8 MG: 4 GUM, CHEWING ORAL at 10:34

## 2019-11-23 RX ADMIN — NICOTINE POLACRILEX 8 MG: 4 GUM, CHEWING ORAL at 13:36

## 2019-11-23 RX ADMIN — ACETAMINOPHEN 650 MG: 325 TABLET, FILM COATED ORAL at 17:54

## 2019-11-23 RX ADMIN — BUSPIRONE HYDROCHLORIDE 10 MG: 10 TABLET ORAL at 10:33

## 2019-11-23 RX ADMIN — NICOTINE POLACRILEX 8 MG: 4 GUM, CHEWING ORAL at 20:11

## 2019-11-23 RX ADMIN — Medication 1 MG: at 10:33

## 2019-11-23 RX ADMIN — FEXOFENADINE HCL 60 MG: 60 TABLET, FILM COATED ORAL at 10:33

## 2019-11-23 RX ADMIN — GABAPENTIN 800 MG: 400 CAPSULE ORAL at 20:28

## 2019-11-23 RX ADMIN — FLUPHENAZINE HYDROCHLORIDE 2 MG: 5 SOLUTION, CONCENTRATE ORAL at 17:54

## 2019-11-23 RX ADMIN — ONDANSETRON HYDROCHLORIDE 4 MG: 4 TABLET, FILM COATED ORAL at 14:25

## 2019-11-23 RX ADMIN — FLUPHENAZINE HYDROCHLORIDE 2 MG: 5 SOLUTION, CONCENTRATE ORAL at 12:52

## 2019-11-23 RX ADMIN — Medication 1 MG: at 13:36

## 2019-11-23 RX ADMIN — NICOTINE POLACRILEX 8 MG: 4 GUM, CHEWING ORAL at 18:37

## 2019-11-23 RX ADMIN — NICOTINE POLACRILEX 8 MG: 4 GUM, CHEWING ORAL at 12:08

## 2019-11-23 RX ADMIN — BUSPIRONE HYDROCHLORIDE 10 MG: 10 TABLET ORAL at 16:28

## 2019-11-23 RX ADMIN — LITHIUM CARBONATE 300 MG: 300 TABLET, EXTENDED RELEASE ORAL at 10:33

## 2019-11-23 RX ADMIN — GABAPENTIN 800 MG: 400 CAPSULE ORAL at 10:33

## 2019-11-23 RX ADMIN — BENZTROPINE MESYLATE 1 MG: 1 TABLET ORAL at 01:30

## 2019-11-23 RX ADMIN — QUETIAPINE FUMARATE 100 MG: 100 TABLET ORAL at 20:29

## 2019-11-23 RX ADMIN — PRAZOSIN HYDROCHLORIDE 2 MG: 2 CAPSULE ORAL at 20:29

## 2019-11-23 RX ADMIN — NICOTINE 1 PATCH: 14 PATCH, EXTENDED RELEASE TRANSDERMAL at 10:33

## 2019-11-23 RX ADMIN — NICOTINE POLACRILEX 8 MG: 4 GUM, CHEWING ORAL at 17:14

## 2019-11-23 RX ADMIN — LITHIUM CARBONATE 900 MG: 450 TABLET, EXTENDED RELEASE ORAL at 20:29

## 2019-11-23 RX ADMIN — MIRTAZAPINE 30 MG: 30 TABLET, FILM COATED ORAL at 20:30

## 2019-11-23 RX ADMIN — GABAPENTIN 800 MG: 400 CAPSULE ORAL at 13:36

## 2019-11-23 ASSESSMENT — ACTIVITIES OF DAILY LIVING (ADL)
LAUNDRY: WITH SUPERVISION
HYGIENE/GROOMING: INDEPENDENT
ORAL_HYGIENE: INDEPENDENT
HYGIENE/GROOMING: INDEPENDENT
DRESS: INDEPENDENT
DRESS: INDEPENDENT
ORAL_HYGIENE: INDEPENDENT

## 2019-11-23 NOTE — PROGRESS NOTES
Pt was out in the milieu all evening positively interacting with other clients. Pt had a full range affect and her mood was calm. Pt's ex-wife came and visited this evening. Pt reported feeling extremely anxious before the visit. Pt asked writer to talk about how she was feeling. Writer and pt talked about positive coping skills to use and pt used many of them. Pt expressed feeling proud that she was able to ask for help, use the coping skills, and for being self-aware. Pt said she had a good visit with her wife and was feeling zero feelings of anxiousness or depression after the visit. Pt was brought more clothes this evening. Writer and pt went through the things in her room and narrowed down the amount of clothing to the acceptable limits. Pt did laundry and took a shower. Pt reported having suicidal ideation and said she would talk to staff if things got worse or she was feeling like she was going to do something. Pt contacted for safety. Pt denies all other SI, SIB, HI, and other mental health symptoms.        11/22/19 9543   Behavioral Health   Hallucinations denies / not responding to hallucinations   Thinking intact   Orientation person: oriented;place: oriented;date: oriented;time: oriented   Memory baseline memory   Insight admits / accepts;insight appropriate to situation;insight appropriate to events   Judgement impaired   Eye Contact at examiner   Affect full range affect   Mood mood is calm   Physical Appearance/Attire attire appropriate to age and situation   Hygiene well groomed;other (see comment)  (Pt showered. )   1. Wish to be Dead (Recent) Yes   2. Non-Specific Active Suicidal Thoughts (Recent) No   Psycho Education   Type of Intervention 1:1 intervention   Response participates, initiates socially appropriate   Hours 0.5   Treatment Detail Check-In   Activities of Daily Living   Hygiene/Grooming independent   Oral Hygiene independent   Dress independent   Laundry with supervision   Room  Organization independent

## 2019-11-23 NOTE — PLAN OF CARE
"\"I'm better\". Patient denies depression, anxiety has improved. Continues to state has suicidal thoughts but contracts for safety. Continues to hear voices all the time, \"they are worse when I'm stressed out\". Is able to focus a little bit better. Attends 1/4 of the groups, is social with peers. Slept until middle morning and has been out in the lounge the rest of the shift.   "

## 2019-11-24 PROCEDURE — 25000132 ZZH RX MED GY IP 250 OP 250 PS 637: Performed by: STUDENT IN AN ORGANIZED HEALTH CARE EDUCATION/TRAINING PROGRAM

## 2019-11-24 PROCEDURE — 25000132 ZZH RX MED GY IP 250 OP 250 PS 637: Performed by: PSYCHIATRY & NEUROLOGY

## 2019-11-24 PROCEDURE — 12400001 ZZH R&B MH UMMC

## 2019-11-24 PROCEDURE — H2032 ACTIVITY THERAPY, PER 15 MIN: HCPCS

## 2019-11-24 RX ORDER — DIPHENHYDRAMINE HCL 25 MG
25 CAPSULE ORAL 2 TIMES DAILY PRN
Status: COMPLETED | OUTPATIENT
Start: 2019-11-24 | End: 2019-11-24

## 2019-11-24 RX ADMIN — NICOTINE 1 PATCH: 14 PATCH, EXTENDED RELEASE TRANSDERMAL at 08:38

## 2019-11-24 RX ADMIN — PRAZOSIN HYDROCHLORIDE 2 MG: 2 CAPSULE ORAL at 20:00

## 2019-11-24 RX ADMIN — NICOTINE POLACRILEX 8 MG: 4 GUM, CHEWING ORAL at 15:49

## 2019-11-24 RX ADMIN — FLUPHENAZINE HYDROCHLORIDE 2 MG: 5 SOLUTION, CONCENTRATE ORAL at 18:52

## 2019-11-24 RX ADMIN — QUETIAPINE FUMARATE 100 MG: 100 TABLET ORAL at 18:42

## 2019-11-24 RX ADMIN — Medication 1 MG: at 08:38

## 2019-11-24 RX ADMIN — NICOTINE POLACRILEX 8 MG: 4 GUM, CHEWING ORAL at 08:43

## 2019-11-24 RX ADMIN — GABAPENTIN 800 MG: 400 CAPSULE ORAL at 20:00

## 2019-11-24 RX ADMIN — LITHIUM CARBONATE 300 MG: 300 TABLET, EXTENDED RELEASE ORAL at 08:38

## 2019-11-24 RX ADMIN — BUSPIRONE HYDROCHLORIDE 10 MG: 10 TABLET ORAL at 15:50

## 2019-11-24 RX ADMIN — Medication 1 MG: at 13:49

## 2019-11-24 RX ADMIN — DIPHENHYDRAMINE HYDROCHLORIDE 25 MG: 25 CAPSULE ORAL at 19:53

## 2019-11-24 RX ADMIN — GABAPENTIN 800 MG: 400 CAPSULE ORAL at 13:49

## 2019-11-24 RX ADMIN — ACETAMINOPHEN 650 MG: 325 TABLET, FILM COATED ORAL at 13:49

## 2019-11-24 RX ADMIN — NICOTINE POLACRILEX 8 MG: 4 GUM, CHEWING ORAL at 18:53

## 2019-11-24 RX ADMIN — NICOTINE POLACRILEX 8 MG: 4 GUM, CHEWING ORAL at 22:23

## 2019-11-24 RX ADMIN — NICOTINE POLACRILEX 8 MG: 4 GUM, CHEWING ORAL at 13:49

## 2019-11-24 RX ADMIN — MIRTAZAPINE 30 MG: 30 TABLET, FILM COATED ORAL at 20:00

## 2019-11-24 RX ADMIN — LITHIUM CARBONATE 900 MG: 450 TABLET, EXTENDED RELEASE ORAL at 20:00

## 2019-11-24 RX ADMIN — BUSPIRONE HYDROCHLORIDE 10 MG: 10 TABLET ORAL at 09:57

## 2019-11-24 RX ADMIN — GABAPENTIN 800 MG: 400 CAPSULE ORAL at 08:38

## 2019-11-24 RX ADMIN — FEXOFENADINE HCL 60 MG: 60 TABLET, FILM COATED ORAL at 08:38

## 2019-11-24 RX ADMIN — ACETAMINOPHEN 650 MG: 325 TABLET, FILM COATED ORAL at 08:39

## 2019-11-24 RX ADMIN — FLUPHENAZINE HYDROCHLORIDE 2 MG: 5 SOLUTION, CONCENTRATE ORAL at 14:32

## 2019-11-24 RX ADMIN — BENZTROPINE MESYLATE 1 MG: 1 TABLET ORAL at 18:42

## 2019-11-24 RX ADMIN — NICOTINE POLACRILEX 8 MG: 4 GUM, CHEWING ORAL at 12:49

## 2019-11-24 RX ADMIN — BENZTROPINE MESYLATE 1 MG: 1 TABLET ORAL at 23:21

## 2019-11-24 ASSESSMENT — ACTIVITIES OF DAILY LIVING (ADL)
DRESS: INDEPENDENT
ORAL_HYGIENE: INDEPENDENT
HYGIENE/GROOMING: INDEPENDENT
ORAL_HYGIENE: INDEPENDENT
HYGIENE/GROOMING: INDEPENDENT
DRESS: INDEPENDENT
LAUNDRY: WITH SUPERVISION
LAUNDRY: WITH SUPERVISION

## 2019-11-24 ASSESSMENT — MIFFLIN-ST. JEOR: SCORE: 1700.66

## 2019-11-24 NOTE — PROGRESS NOTES
"Pt's affect fluctuated between calm and anxious throughout shift. Her thinking was organized and she denied experiencing any current psychotic symptoms. Pt displayed mood lability, at times become irritable and oppositional regarding unit policies and perceived \"Staff inconsistencies.\" Pt was able to use coping skills and remove herself from escalating confrontation with continued reassurance. She spent much of the shift socializing with peers and completed goal to draw mindfully. Pt continues to endorse depression, anxiety, and passive SI but was able to contract for safety.          11/24/19 1500   Behavioral Health   Hallucinations denies / not responding to hallucinations   Thinking intact   Orientation person: oriented;place: oriented;date: oriented;time: oriented   Memory baseline memory   Insight poor   Judgement impaired   Affect full range affect   Mood labile   Physical Appearance/Attire appears stated age;attire appropriate to age and situation   Hygiene neglected grooming - unclean body, hair, teeth   1. Wish to be Dead (Recent) Yes   Wish to be Dead Description (Recent) Pt reports passive thoughts of death   2. Non-Specific Active Suicidal Thoughts (Recent) Yes   Non-Specific Active Suicidal Thought Description (Recent) Pt reports passive SI w/o plan or intent   Psycho Education   Type of Intervention 1:1 intervention   Response participates, initiates socially appropriate   Hours 0.5   Treatment Detail   (Anger management discussion)   Activities of Daily Living   Hygiene/Grooming independent   Oral Hygiene independent   Dress independent   Laundry with supervision   Room Organization independent     "

## 2019-11-24 NOTE — PROGRESS NOTES
Pt was out in the milieu this evening coloring and positively interacting with other pts. Pt presented with full range affect and her mood was calm. Pt stated she was having an okay day and was enjoying her relaxation. Pt stated she was feeling a little bit anxious and no signs of depression. Pt stated she was experiencing passive suicidal thoughts and didn't have any plans to act on them. Pt showed use of coping skills. Pt denied all SIB, HI, and other mental health symptoms.        11/23/19 2200   Behavioral Health   Hallucinations denies / not responding to hallucinations   Thinking intact   Orientation person: oriented;place: oriented;date: oriented;time: oriented   Memory baseline memory   Insight admits / accepts   Judgement impaired   Eye Contact at examiner   Affect full range affect   Mood anxious   Physical Appearance/Attire attire appropriate to age and situation   Hygiene well groomed   1. Wish to be Dead (Recent) Yes   2. Non-Specific Active Suicidal Thoughts (Recent) No   Psycho Education   Type of Intervention 1:1 intervention   Response participates, initiates socially appropriate   Hours 0.5   Treatment Detail Check-In   Activities of Daily Living   Hygiene/Grooming independent   Oral Hygiene independent   Dress independent   Laundry with supervision   Room Organization independent

## 2019-11-25 LAB — INTERPRETATION ECG - MUSE: NORMAL

## 2019-11-25 PROCEDURE — 25000132 ZZH RX MED GY IP 250 OP 250 PS 637: Performed by: STUDENT IN AN ORGANIZED HEALTH CARE EDUCATION/TRAINING PROGRAM

## 2019-11-25 PROCEDURE — 25000128 H RX IP 250 OP 636: Performed by: STUDENT IN AN ORGANIZED HEALTH CARE EDUCATION/TRAINING PROGRAM

## 2019-11-25 PROCEDURE — G0177 OPPS/PHP; TRAIN & EDUC SERV: HCPCS

## 2019-11-25 PROCEDURE — 12400001 ZZH R&B MH UMMC

## 2019-11-25 PROCEDURE — 99232 SBSQ HOSP IP/OBS MODERATE 35: CPT | Mod: GC | Performed by: PSYCHIATRY & NEUROLOGY

## 2019-11-25 PROCEDURE — 25000132 ZZH RX MED GY IP 250 OP 250 PS 637: Performed by: PSYCHIATRY & NEUROLOGY

## 2019-11-25 PROCEDURE — H2032 ACTIVITY THERAPY, PER 15 MIN: HCPCS

## 2019-11-25 RX ORDER — LIDOCAINE 4 G/G
1 PATCH TOPICAL
Status: DISCONTINUED | OUTPATIENT
Start: 2019-11-25 | End: 2019-11-26

## 2019-11-25 RX ORDER — FLUPHENAZINE HYDROCHLORIDE 1 MG/1
1 TABLET ORAL 2 TIMES DAILY PRN
Status: DISCONTINUED | OUTPATIENT
Start: 2019-11-25 | End: 2019-11-26

## 2019-11-25 RX ADMIN — NICOTINE POLACRILEX 8 MG: 4 GUM, CHEWING ORAL at 14:56

## 2019-11-25 RX ADMIN — PRAZOSIN HYDROCHLORIDE 2 MG: 2 CAPSULE ORAL at 20:44

## 2019-11-25 RX ADMIN — GABAPENTIN 800 MG: 400 CAPSULE ORAL at 13:34

## 2019-11-25 RX ADMIN — BUSPIRONE HYDROCHLORIDE 10 MG: 10 TABLET ORAL at 15:45

## 2019-11-25 RX ADMIN — LIDOCAINE 1 PATCH: 560 PATCH PERCUTANEOUS; TOPICAL; TRANSDERMAL at 16:32

## 2019-11-25 RX ADMIN — GABAPENTIN 800 MG: 400 CAPSULE ORAL at 09:02

## 2019-11-25 RX ADMIN — ACETAMINOPHEN 650 MG: 325 TABLET, FILM COATED ORAL at 11:07

## 2019-11-25 RX ADMIN — Medication 1 MG: at 13:34

## 2019-11-25 RX ADMIN — MIRTAZAPINE 30 MG: 30 TABLET, FILM COATED ORAL at 20:44

## 2019-11-25 RX ADMIN — OLANZAPINE 10 MG: 10 INJECTION, POWDER, LYOPHILIZED, FOR SOLUTION INTRAMUSCULAR at 19:32

## 2019-11-25 RX ADMIN — NICOTINE POLACRILEX 8 MG: 4 GUM, CHEWING ORAL at 18:35

## 2019-11-25 RX ADMIN — NICOTINE 1 PATCH: 14 PATCH, EXTENDED RELEASE TRANSDERMAL at 09:21

## 2019-11-25 RX ADMIN — FEXOFENADINE HCL 60 MG: 60 TABLET, FILM COATED ORAL at 09:01

## 2019-11-25 RX ADMIN — Medication 1 MG: at 09:02

## 2019-11-25 RX ADMIN — GABAPENTIN 800 MG: 400 CAPSULE ORAL at 20:44

## 2019-11-25 RX ADMIN — LITHIUM CARBONATE 900 MG: 450 TABLET, EXTENDED RELEASE ORAL at 20:43

## 2019-11-25 RX ADMIN — BENZTROPINE MESYLATE 1 MG: 1 TABLET ORAL at 12:07

## 2019-11-25 RX ADMIN — BUSPIRONE HYDROCHLORIDE 10 MG: 10 TABLET ORAL at 09:02

## 2019-11-25 RX ADMIN — FLUPHENAZINE HYDROCHLORIDE 2 MG: 5 SOLUTION, CONCENTRATE ORAL at 15:45

## 2019-11-25 RX ADMIN — LITHIUM CARBONATE 300 MG: 300 TABLET, EXTENDED RELEASE ORAL at 09:02

## 2019-11-25 RX ADMIN — NICOTINE POLACRILEX 8 MG: 4 GUM, CHEWING ORAL at 21:00

## 2019-11-25 RX ADMIN — NICOTINE POLACRILEX 8 MG: 4 GUM, CHEWING ORAL at 12:07

## 2019-11-25 RX ADMIN — NICOTINE POLACRILEX 8 MG: 4 GUM, CHEWING ORAL at 09:22

## 2019-11-25 RX ADMIN — NICOTINE POLACRILEX 8 MG: 4 GUM, CHEWING ORAL at 13:34

## 2019-11-25 RX ADMIN — QUETIAPINE FUMARATE 100 MG: 100 TABLET ORAL at 20:44

## 2019-11-25 ASSESSMENT — ACTIVITIES OF DAILY LIVING (ADL)
HYGIENE/GROOMING: INDEPENDENT
DRESS: INDEPENDENT
LAUNDRY: WITH SUPERVISION
ORAL_HYGIENE: INDEPENDENT

## 2019-11-25 NOTE — PROGRESS NOTES
11/25/19 1400   Behavioral Health   Hallucinations denies / not responding to hallucinations   Thinking intact   Orientation person: oriented;place: oriented;date: oriented   Memory baseline memory   Insight insight appropriate to situation;insight appropriate to events   Judgement impaired   Eye Contact at examiner   Affect full range affect   Mood mood is calm   Physical Appearance/Attire appears stated age   Hygiene well groomed   Suicidality thoughts only   Elopement   (denies)     Pt had a good shift and took all her meals,interacted with peers and also attended and participated in groups,pt stated that she was upset because she feels no one has time for her and also said she is gaining a lot of weight and not happy about that.

## 2019-11-25 NOTE — PROGRESS NOTES
[facility] Program Case Management/Care Coordination  A Part of the [facility] First Episode of Psychosis Program     Patient Name: Ayana Prasad  /Age:  1990 (29 year old)  Diagnosis(es):   Schizoaffective disorder, bipolar type (F25.0)  Participants: Ayana Prasad, Sterling Jimenez (Family Clinician), Linh Hamilton (SEE), this writer (IRT), Natalie Clark (Inpatient Psychiatry Resident)     Assessment/Progress Note:     The [facility] Team contacted Ayana Prasad, who was accompanied by Natalie, inpatient psychiatry resident. The team discussed Ayana's discharge plans after hospitalization. Natalie noted [facility]) does not provide DBT specific services, so they are considering alternate housing options at this time. This writer encouraged them to contact Ayana's [facility]  through commitment for additional supports in locating resources. Sterling provided information regarding our recommendations for primary DBT treatment. This writer reinforced this, stating [facility] is CBT driven and is not evidence-based for emotion regulation. Ayana seemed guarded, but noted she understood and is interested in discharging from the hospital. Linh (SEE) provided information regarding employment supports, including vocational rehabilitation. The [facility] Team discussed Ayana's progress within the program and encouraged her to continue utilizing supportive services to enhance recovery. This writer offered a one-time phone call for closure purposes; Ayana was undecided. This writer encouraged Ayana to call the clinic if wanting to pursue this final phone call.     Plan/Referrals:     This writer will provide follow-up resources as needed, to ensure a warm hand off of services.     Deena Ascencio [facility]   [facility] Individual Resiliency Trainer

## 2019-11-25 NOTE — PROGRESS NOTES
"   11/24/19 2100   General Information   Art Directive other (see comments)   AT directive was to create a segment of a group [facility] project, using watercolors and other art media of pts choice. Pt were encouraged to think of \"their path of healing\" drawing/painting from the outside of the [place] inward. Goals of directive: building social interest (group project/collaboration), future-focused, identifying personal strengths and goals, emotional expression. Pt was a positive participant, focused on task for the full duration of group. Pt finished painting and briefly shared with group. Pt shared her theme of \"space is limitless\" and created a segment of an outer space theme with planets. Pt was proud of finished artwork and referred to the artwork as symbolizing her sense of hope for the future.  Pts mood was calm.  "

## 2019-11-25 NOTE — PROGRESS NOTES
Ayana   attended 1 of 3 OT groups today. She  attended an OT Health and Coping group this a.m. that involved stretching and gentle movements for stress relief. Pleasant and agreeable. No major changes noted in presentation.      11/25/19 1400   Occupational Therapy   Type of Intervention structured groups   Response Initiates, socially acceptable   Hours 1

## 2019-11-25 NOTE — PROGRESS NOTES
Pt was out in the milieu all evening. Pt presented with a full range affect and an anxious mood. Pt said she was feeling anxious earlier this evening and asked for some medication to help with the feelings. Pt said the medication really helped her and she is talking to staff more when she is feels this way. Writer reinforced using her positive coping skills and said she seems to be doing better than when she first came in. Pt said she was not having any SI or thoughts of SIB. Pt said she was not feeling anxious or depressed after she took her meds. Pt was laughing with other pts in the lounge before bed. Pt denies all mental health symptoms and HI.        11/24/19 2200   Behavioral Health   Hallucinations denies / not responding to hallucinations   Thinking intact   Orientation person: oriented;place: oriented;date: oriented;time: oriented   Memory baseline memory   Insight admits / accepts   Judgement impaired   Eye Contact at examiner   Affect full range affect   Mood anxious   Physical Appearance/Attire attire appropriate to age and situation   Hygiene well groomed  (Pt washed her hair this evening)   1. Wish to be Dead (Recent) Yes   Wish to be Dead Description (Recent) Pt denies all thoughts tonight   2. Non-Specific Active Suicidal Thoughts (Recent) No   Psycho Education   Type of Intervention 1:1 intervention   Response participates, initiates socially appropriate   Hours 0.5   Treatment Detail Check-In   Activities of Daily Living   Hygiene/Grooming independent   Oral Hygiene independent   Dress independent   Laundry with supervision   Room Organization independent

## 2019-11-26 PROCEDURE — 25000132 ZZH RX MED GY IP 250 OP 250 PS 637: Performed by: STUDENT IN AN ORGANIZED HEALTH CARE EDUCATION/TRAINING PROGRAM

## 2019-11-26 PROCEDURE — 90837 PSYTX W PT 60 MINUTES: CPT

## 2019-11-26 PROCEDURE — 25000132 ZZH RX MED GY IP 250 OP 250 PS 637: Performed by: PSYCHIATRY & NEUROLOGY

## 2019-11-26 PROCEDURE — 12400001 ZZH R&B MH UMMC

## 2019-11-26 PROCEDURE — 25000128 H RX IP 250 OP 636: Performed by: STUDENT IN AN ORGANIZED HEALTH CARE EDUCATION/TRAINING PROGRAM

## 2019-11-26 PROCEDURE — 99232 SBSQ HOSP IP/OBS MODERATE 35: CPT | Mod: GC | Performed by: PSYCHIATRY & NEUROLOGY

## 2019-11-26 RX ORDER — IPRATROPIUM BROMIDE 21 UG/1
2 SPRAY, METERED NASAL EVERY 12 HOURS PRN
Status: DISCONTINUED | OUTPATIENT
Start: 2019-11-26 | End: 2019-12-17 | Stop reason: HOSPADM

## 2019-11-26 RX ORDER — ACETAMINOPHEN 325 MG/1
650 TABLET ORAL 4 TIMES DAILY
Status: DISCONTINUED | OUTPATIENT
Start: 2019-11-26 | End: 2019-12-17 | Stop reason: HOSPADM

## 2019-11-26 RX ORDER — FLUPHENAZINE HYDROCHLORIDE 1 MG/1
1 TABLET ORAL 2 TIMES DAILY
Status: DISCONTINUED | OUTPATIENT
Start: 2019-11-27 | End: 2019-12-17 | Stop reason: HOSPADM

## 2019-11-26 RX ADMIN — LIDOCAINE 1 PATCH: 560 PATCH PERCUTANEOUS; TOPICAL; TRANSDERMAL at 13:01

## 2019-11-26 RX ADMIN — LITHIUM CARBONATE 900 MG: 450 TABLET, EXTENDED RELEASE ORAL at 21:35

## 2019-11-26 RX ADMIN — NICOTINE POLACRILEX 8 MG: 4 GUM, CHEWING ORAL at 14:39

## 2019-11-26 RX ADMIN — ACETAMINOPHEN 650 MG: 325 TABLET, FILM COATED ORAL at 21:37

## 2019-11-26 RX ADMIN — GABAPENTIN 800 MG: 400 CAPSULE ORAL at 10:45

## 2019-11-26 RX ADMIN — NICOTINE 1 PATCH: 14 PATCH, EXTENDED RELEASE TRANSDERMAL at 10:44

## 2019-11-26 RX ADMIN — PRAZOSIN HYDROCHLORIDE 2 MG: 2 CAPSULE ORAL at 21:39

## 2019-11-26 RX ADMIN — Medication 1 MG: at 14:40

## 2019-11-26 RX ADMIN — Medication 1 MG: at 10:44

## 2019-11-26 RX ADMIN — BUSPIRONE HYDROCHLORIDE 10 MG: 10 TABLET ORAL at 10:45

## 2019-11-26 RX ADMIN — NICOTINE POLACRILEX 8 MG: 4 GUM, CHEWING ORAL at 16:51

## 2019-11-26 RX ADMIN — IPRATROPIUM BROMIDE 2 SPRAY: 21 SPRAY NASAL at 19:19

## 2019-11-26 RX ADMIN — MENTHOL 1 PATCH: 205.5 PATCH TOPICAL at 22:49

## 2019-11-26 RX ADMIN — FEXOFENADINE HCL 60 MG: 60 TABLET, FILM COATED ORAL at 10:44

## 2019-11-26 RX ADMIN — MIRTAZAPINE 30 MG: 30 TABLET, FILM COATED ORAL at 21:38

## 2019-11-26 RX ADMIN — ONDANSETRON HYDROCHLORIDE 4 MG: 4 TABLET, FILM COATED ORAL at 21:35

## 2019-11-26 RX ADMIN — NICOTINE POLACRILEX 8 MG: 4 GUM, CHEWING ORAL at 19:25

## 2019-11-26 RX ADMIN — GABAPENTIN 800 MG: 400 CAPSULE ORAL at 14:39

## 2019-11-26 RX ADMIN — LITHIUM CARBONATE 300 MG: 300 TABLET, EXTENDED RELEASE ORAL at 10:44

## 2019-11-26 RX ADMIN — BUSPIRONE HYDROCHLORIDE 10 MG: 10 TABLET ORAL at 16:51

## 2019-11-26 RX ADMIN — NICOTINE POLACRILEX 8 MG: 4 GUM, CHEWING ORAL at 11:45

## 2019-11-26 RX ADMIN — NICOTINE POLACRILEX 8 MG: 4 GUM, CHEWING ORAL at 21:34

## 2019-11-26 RX ADMIN — FLUPHENAZINE HYDROCHLORIDE 1 MG: 1 TABLET, FILM COATED ORAL at 19:25

## 2019-11-26 RX ADMIN — GABAPENTIN 800 MG: 400 CAPSULE ORAL at 21:38

## 2019-11-26 RX ADMIN — QUETIAPINE FUMARATE 100 MG: 100 TABLET ORAL at 21:38

## 2019-11-26 RX ADMIN — NICOTINE POLACRILEX 8 MG: 4 GUM, CHEWING ORAL at 10:44

## 2019-11-26 ASSESSMENT — ACTIVITIES OF DAILY LIVING (ADL)
ORAL_HYGIENE: INDEPENDENT
HYGIENE/GROOMING: INDEPENDENT
DRESS: INDEPENDENT
DRESS: INDEPENDENT
LAUNDRY: WITH SUPERVISION
HYGIENE/GROOMING: INDEPENDENT
LAUNDRY: WITH SUPERVISION
ORAL_HYGIENE: INDEPENDENT

## 2019-11-26 NOTE — PROGRESS NOTES
RE - psychiatry follow up    patient will no longer be seen for psychiatry in [facility] clinic track or supportive therapy services within that model. patient was informed of this Friday last week.     This writer has reached out to [facility] team for ongoing clarification and coordination. Per Linh  in [facility] they were checking into DBT track and referral to medication management in clinic.     This morning this writer sent message to [facility] psychiatry intake with urgent request to schedule intake for ongoing psychiatry as will no longer be seen via [facility]. Awaiting response at this time.       [facility] Contracted Case Management -     Voicemail left with Emily Rodriguez with [facility]. Direct dial: [phone] patient's assigned ongoing contracted TCM. In message introduced myself as taking over patient case, stated was told she may be stopping by today, asked that if she does come today to ask staff if I am available. Also stated would like to talk to her about discharge / Provisional Discharge plan as team is moving towards discharge as soon as this week. Left my direct number for contact .

## 2019-11-26 NOTE — PROGRESS NOTES
RE - psychiatry follow up -    medication transfer appointment with Dr Sal Alaniz on 12/10 at 9 AM at the [facility].

## 2019-11-26 NOTE — PROGRESS NOTES
"   11/25/19 1900   General Information   Art Directive other (see comments)   AT directive was to create a segment of a group [facility] project, using watercolors and other art media of pts choice. Pt were encouraged to think of \"their path of healing\" drawing/painting from the outside of the [place] inward. Goals of directive: building social interest (group project/collaboration), future-focused, identifying personal strengths and goals, emotional expression. Pt was a positive participant, more of an observer in group today. Pt was somewhat distracted by another pt who was escalating in the milieu. Group members were asked to stay longer in OT room because of a code being called. Pt said to author at one point, \"We're all going to die in here\" and appeared somewhat anxious due to having to stay in the room during code. Pt did seem to calm once code was over.  "

## 2019-11-26 NOTE — PROGRESS NOTES
Pt was visible in the milieu, irritable at times throughout the day when needs were not met immediately. Pt appeared anxious, more periods of calm, social with peers. Pt denies SI/SIB, hallucinations, and medication side effects.        11/26/19 1500   Behavioral Health   Hallucinations denies / not responding to hallucinations   Thinking intact   Orientation person: oriented;place: oriented;date: oriented;time: oriented   Memory baseline memory   Insight insight appropriate to situation   Judgement impaired   Eye Contact at examiner   Affect irritable;full range affect   Mood anxious   Physical Appearance/Attire attire appropriate to age and situation   Hygiene neglected grooming - unclean body, hair, teeth   Suicidality other (see comments)  (denies)   1. Wish to be Dead (Recent) No   2. Non-Specific Active Suicidal Thoughts (Recent) No   Activity other (see comment)  (visible in the milieu)   Speech clear;coherent   Medication Sensitivity no stated side effects;no observed side effects   Psychomotor / Gait balanced;steady   Activities of Daily Living   Hygiene/Grooming independent   Oral Hygiene independent   Dress independent   Laundry with supervision   Room Organization independent
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
## 2019-11-26 NOTE — PROGRESS NOTES
Pt was out in the milieu all evening interacting with other pts. Pt presented with a full range and anxious affect and her mood was calm and anxious. During check-in, pt stated her day was going better and she was using positive coping skills like walking to help her energy. Pt said she had no SI. Pt said her depression on a scale from 0 to 10 (0=none and 10=worst) was a 0 and so was her anxiety.     Later in the evening, pt was sitting in the lounge with other pts talking about medication they were taking. Another pt stated that they prefer the shot of zyprexa because it works faster. This pt said she was feeling anxious and was going to get the shot so it worked quickly. Pt went to the med window and asked for zyprexa as a shot. When nursing offered it to her in the form of a pill, pt stated she was going to do something stupid like punch a wall so she could get the shot instead. Nursing gave pt the shot.     Pt was also observed to be swearing a lot in the lounge and challenging staff on decisions. Pt was staff splitting and trying to manipulate staff to get what she wants. This was a reoccurring issue multiple times throughout this signal shift.        11/25/19 2042   Behavioral Health   Hallucinations denies / not responding to hallucinations   Thinking intact   Orientation person: oriented;place: oriented;date: oriented;time: oriented   Memory baseline memory   Insight insight appropriate to events;insight appropriate to situation   Judgement impaired   Eye Contact at examiner   Affect full range affect   Mood mood is calm   Physical Appearance/Attire appears stated age   Hygiene well groomed   1. Wish to be Dead (Recent) Yes   2. Non-Specific Active Suicidal Thoughts (Recent) No   Psycho Education   Type of Intervention 1:1 intervention   Response participates, initiates socially appropriate   Hours 0.5   Treatment Detail Check-In   Activities of Daily Living   Hygiene/Grooming independent   Oral Hygiene  independent   Dress independent   Laundry with supervision   Room Organization independent
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
## 2019-11-26 NOTE — PROGRESS NOTES
"Pt was observed using her permanent markers to color in her tattoo on her arm. Staff told pt that was not what the markers were supposed to be used for. Pt responded \"okay\" in a neutral voice and kept coloring on herself.   "

## 2019-11-26 NOTE — PROGRESS NOTES
Restricted Recipient Program - [facility] [place]  Ph: [phone]    patient is a restricted recipient will need authorization for provider and pharmacy for discharge.   This writer will follow up with above and update.

## 2019-11-26 NOTE — PROGRESS NOTES
CLINICAL NUTRITION SERVICES - BRIEF NOTE    Reason for Evaluation: Patient/Family Request - Patient states has put on 15 pounds since admission, wants to lose weight    RD met with pt today who reportedly wanted guidance on tips for healthier eating.     Interventions  - Nutrition Education: discussed the principles of My Plate and encouraged the pt to used at least three of her side choices from fruits and veggies.  - Encourage the pt to reduced snacking and eventually eliminate it    Follow Up/ Monitoring  No nutrition follow-up warranted at this time. RD to sign off. Please consult if further needs arise.         Lauryn Simmons RD, LD  Pager: [phone]

## 2019-11-27 LAB — INTERPRETATION ECG - MUSE: NORMAL

## 2019-11-27 PROCEDURE — 93010 ELECTROCARDIOGRAM REPORT: CPT | Performed by: INTERNAL MEDICINE

## 2019-11-27 PROCEDURE — 93005 ELECTROCARDIOGRAM TRACING: CPT

## 2019-11-27 PROCEDURE — 25000132 ZZH RX MED GY IP 250 OP 250 PS 637: Performed by: STUDENT IN AN ORGANIZED HEALTH CARE EDUCATION/TRAINING PROGRAM

## 2019-11-27 PROCEDURE — 25000128 H RX IP 250 OP 636: Performed by: STUDENT IN AN ORGANIZED HEALTH CARE EDUCATION/TRAINING PROGRAM

## 2019-11-27 PROCEDURE — H2032 ACTIVITY THERAPY, PER 15 MIN: HCPCS

## 2019-11-27 PROCEDURE — 12400001 ZZH R&B MH UMMC

## 2019-11-27 PROCEDURE — 99232 SBSQ HOSP IP/OBS MODERATE 35: CPT | Mod: GC | Performed by: PSYCHIATRY & NEUROLOGY

## 2019-11-27 PROCEDURE — 25000132 ZZH RX MED GY IP 250 OP 250 PS 637: Performed by: PSYCHIATRY & NEUROLOGY

## 2019-11-27 PROCEDURE — G0177 OPPS/PHP; TRAIN & EDUC SERV: HCPCS

## 2019-11-27 RX ADMIN — MIRTAZAPINE 30 MG: 30 TABLET, FILM COATED ORAL at 20:57

## 2019-11-27 RX ADMIN — NICOTINE POLACRILEX 8 MG: 4 GUM, CHEWING ORAL at 21:15

## 2019-11-27 RX ADMIN — FLUPHENAZINE DECANOATE 6.25 MG: 25 INJECTION, SOLUTION INTRAMUSCULAR; SUBCUTANEOUS at 19:51

## 2019-11-27 RX ADMIN — ACETAMINOPHEN 650 MG: 325 TABLET, FILM COATED ORAL at 17:13

## 2019-11-27 RX ADMIN — FEXOFENADINE HCL 60 MG: 60 TABLET, FILM COATED ORAL at 09:03

## 2019-11-27 RX ADMIN — BENZTROPINE MESYLATE 1 MG: 1 TABLET ORAL at 19:30

## 2019-11-27 RX ADMIN — NICOTINE 1 PATCH: 14 PATCH, EXTENDED RELEASE TRANSDERMAL at 09:05

## 2019-11-27 RX ADMIN — IPRATROPIUM BROMIDE 2 SPRAY: 21 SPRAY NASAL at 20:53

## 2019-11-27 RX ADMIN — PRAZOSIN HYDROCHLORIDE 2 MG: 2 CAPSULE ORAL at 20:53

## 2019-11-27 RX ADMIN — GABAPENTIN 800 MG: 400 CAPSULE ORAL at 13:36

## 2019-11-27 RX ADMIN — Medication 1 MG: at 09:03

## 2019-11-27 RX ADMIN — Medication 1 MG: at 13:36

## 2019-11-27 RX ADMIN — NICOTINE POLACRILEX 8 MG: 4 GUM, CHEWING ORAL at 09:04

## 2019-11-27 RX ADMIN — NICOTINE POLACRILEX 8 MG: 4 GUM, CHEWING ORAL at 18:44

## 2019-11-27 RX ADMIN — GABAPENTIN 800 MG: 400 CAPSULE ORAL at 20:54

## 2019-11-27 RX ADMIN — LITHIUM CARBONATE 300 MG: 300 TABLET, EXTENDED RELEASE ORAL at 09:03

## 2019-11-27 RX ADMIN — NICOTINE POLACRILEX 8 MG: 4 GUM, CHEWING ORAL at 20:05

## 2019-11-27 RX ADMIN — BENZTROPINE MESYLATE 1 MG: 1 TABLET ORAL at 21:42

## 2019-11-27 RX ADMIN — NICOTINE POLACRILEX 8 MG: 4 GUM, CHEWING ORAL at 20:06

## 2019-11-27 RX ADMIN — BUSPIRONE HYDROCHLORIDE 10 MG: 10 TABLET ORAL at 09:03

## 2019-11-27 RX ADMIN — ACETAMINOPHEN 650 MG: 325 TABLET, FILM COATED ORAL at 13:36

## 2019-11-27 RX ADMIN — BUSPIRONE HYDROCHLORIDE 10 MG: 10 TABLET ORAL at 17:14

## 2019-11-27 RX ADMIN — QUETIAPINE FUMARATE 100 MG: 100 TABLET ORAL at 20:54

## 2019-11-27 RX ADMIN — FLUPHENAZINE HYDROCHLORIDE 1 MG: 1 TABLET, FILM COATED ORAL at 09:03

## 2019-11-27 RX ADMIN — GABAPENTIN 800 MG: 400 CAPSULE ORAL at 09:03

## 2019-11-27 RX ADMIN — IPRATROPIUM BROMIDE 2 SPRAY: 21 SPRAY NASAL at 09:07

## 2019-11-27 RX ADMIN — NICOTINE POLACRILEX 8 MG: 4 GUM, CHEWING ORAL at 10:13

## 2019-11-27 RX ADMIN — NICOTINE POLACRILEX 8 MG: 4 GUM, CHEWING ORAL at 17:14

## 2019-11-27 RX ADMIN — ACETAMINOPHEN 650 MG: 325 TABLET, FILM COATED ORAL at 09:04

## 2019-11-27 RX ADMIN — LITHIUM CARBONATE 900 MG: 450 TABLET, EXTENDED RELEASE ORAL at 20:52

## 2019-11-27 RX ADMIN — ACETAMINOPHEN 650 MG: 325 TABLET, FILM COATED ORAL at 20:54

## 2019-11-27 RX ADMIN — FLUPHENAZINE HYDROCHLORIDE 1 MG: 1 TABLET, FILM COATED ORAL at 19:29

## 2019-11-27 ASSESSMENT — ACTIVITIES OF DAILY LIVING (ADL)
DRESS: INDEPENDENT
HYGIENE/GROOMING: INDEPENDENT
ORAL_HYGIENE: INDEPENDENT

## 2019-11-27 NOTE — PROGRESS NOTES
Inpatient Diagnostic Assessment order acknowledged.  Patient is anticipated to be seen today 11/27/2019 or possibly sooner if schedule allows.    Valentino Acosta LPCC

## 2019-11-27 NOTE — PROGRESS NOTES
RE - IRTS referrals    Valentino Andrew [phone] [phone]  Katty Young [phone] [phone]  [facility] [phone] [phone]  [facility] [phone] [phone]  [facility] [place] [phone] [phone]  [facility] [place] [phone] [phone]  [facility] [phone] [phone]  [facility] [phone] [phone]  [facility] [place] [phone] [phone]  [facility] [phone] [phone]     patient is referred to the above IRTS as of 11/26/19

## 2019-11-27 NOTE — PROGRESS NOTES
Pt was out in the milieu this evening. Pt presented with full range affect and her mood was irritable.    Before check in, a staff member had gotten a snack for another pt from the kitchen and this pt was mad when staff would not put her extra ranch away in the dining room. Pt got mad at staff for discriminating against her because the staff would not 'do things for her'. Pt became angry and complained loudly in the lounge about the staff members to other pts. After a while, pt seemed to calm down and asked to check in.    During check in, pt stated she was feeling more sensitive this evening because her, her care team, and her dad decided she was not going home before Thanksgiving. Pt stated she is looking forward to a placement and has no interest in staying sober. Writer encouraged pt to think about harm reduction treatment as an option. Pt seemed interested. Pt reported her anxiety on a scale from 1-10 being a 10 and her depression was a 0. Pt said she was having passive suicidal thoughts and did not have any intention to act on them. Pt contacts for safety. Pt denied all other mental health symptoms, SIB, and HI.        11/26/19 1900   Behavioral Health   Hallucinations denies / not responding to hallucinations   Thinking intact   Orientation person: oriented;place: oriented;date: oriented;time: oriented   Memory baseline memory   Insight insight appropriate to situation   Judgement impaired   Eye Contact at examiner   Affect full range affect;irritable   Mood anxious   Physical Appearance/Attire attire appropriate to age and situation   Hygiene neglected grooming - unclean body, hair, teeth   1. Wish to be Dead (Recent) Yes  (No plans to act)   2. Non-Specific Active Suicidal Thoughts (Recent) No   Psycho Education   Type of Intervention 1:1 intervention   Response participates, initiates socially appropriate   Hours 0.5   Treatment Detail Check-In   Activities of Daily Living   Hygiene/Grooming independent    Oral Hygiene independent   Dress independent   Laundry with supervision   Room Organization independent
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
## 2019-11-27 NOTE — PROGRESS NOTES
Pt was attempted to be seen for MH Diagnostic Assessment.  Staff indicated that pt sleeps until 11 am and could be seen thereafter.  DA will be attempted at a later time if staff available.    If pt can be scheduled for an outpatient appointment, please call [phone].    BOB HornSW

## 2019-11-27 NOTE — PLAN OF CARE
Problem: OT General Care Plan  Goal: OT Goal 1  Identify 3 mental health symptoms to increase insight & 3 positive coping skills to manage symptoms.     Pt attended 1 out of 2 OT groups offered. Pt actively participated in occupational therapy clinic. Pt was able to ask for assistance as needed, and independently initiated a structured creative expression task. Pt demonstrated good focus, planning, and attention to detail. Social with peers and writer throughout, and politely offered assistance to a peer. She was observed falling asleep towards the end of group. Calm, pleasant, and cooperative throughout this group. Affect appeared to brighten in interactions.

## 2019-11-27 NOTE — PROGRESS NOTES
"Pt engaged in individual psychotherapy with an approach to be more focused on practical transition to a \"good discharge\" that would not reverse any progress she has made while inpatient.  This caused a refocus on setting up the most supportive outpatient care and living conditions for her mental health symptoms.  Pt was more focused on the practical realities of an immediate discharge versus a \"quality\" discharge.         11/26/19 1530   Dance Movement Therapy   Type of Intervention 1:1 intervention   Response participates, initiates socially appropriate   Hours 1     "

## 2019-11-27 NOTE — PROGRESS NOTES
"Pt played a strong, but familiar role in group processes, as well as focused on a balance between light-hearted \"fun\" and addressing serious concerns in a mature manner.      The group was cohesive and supportive of each other.  They enjoyed movement together and affect was positively impacted in all who joined.  The group allowed for creative personal expression in a non-judgemental environment.  Pt was a strong contributor to these group processes.       11/27/19 5360   Dance Movement Therapy   Type of Intervention structured groups   Response participates, initiates socially appropriate   Hours 1       "

## 2019-11-27 NOTE — PROGRESS NOTES
Good shift today. Social with peers and attending groups. Offers no complaints. Pleasant and approachable.         11/27/19 1400   Behavioral Health   Hallucinations denies / not responding to hallucinations   Thinking distractable   Orientation person: oriented;place: oriented;date: oriented;time: oriented   Memory baseline memory   Insight   (Improving.)   Judgement impaired   Affect   (Brighter.)   Mood mood is calm   Physical Appearance/Attire attire appropriate to age and situation   Hygiene neglected grooming - unclean body, hair, teeth   1. Wish to be Dead (Recent) No   Activity restless   Speech coherent;clear
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
## 2019-11-28 LAB — LITHIUM SERPL-SCNC: 0.81 MMOL/L (ref 0.6–1.2)

## 2019-11-28 PROCEDURE — 90853 GROUP PSYCHOTHERAPY: CPT

## 2019-11-28 PROCEDURE — 80178 ASSAY OF LITHIUM: CPT | Performed by: STUDENT IN AN ORGANIZED HEALTH CARE EDUCATION/TRAINING PROGRAM

## 2019-11-28 PROCEDURE — 25000132 ZZH RX MED GY IP 250 OP 250 PS 637: Performed by: STUDENT IN AN ORGANIZED HEALTH CARE EDUCATION/TRAINING PROGRAM

## 2019-11-28 PROCEDURE — 12400001 ZZH R&B MH UMMC

## 2019-11-28 PROCEDURE — 25000128 H RX IP 250 OP 636: Performed by: STUDENT IN AN ORGANIZED HEALTH CARE EDUCATION/TRAINING PROGRAM

## 2019-11-28 PROCEDURE — 36415 COLL VENOUS BLD VENIPUNCTURE: CPT | Performed by: STUDENT IN AN ORGANIZED HEALTH CARE EDUCATION/TRAINING PROGRAM

## 2019-11-28 PROCEDURE — 99232 SBSQ HOSP IP/OBS MODERATE 35: CPT | Performed by: PSYCHIATRY & NEUROLOGY

## 2019-11-28 PROCEDURE — 25000132 ZZH RX MED GY IP 250 OP 250 PS 637: Performed by: PSYCHIATRY & NEUROLOGY

## 2019-11-28 RX ADMIN — NICOTINE POLACRILEX 8 MG: 4 GUM, CHEWING ORAL at 13:04

## 2019-11-28 RX ADMIN — QUETIAPINE FUMARATE 100 MG: 100 TABLET ORAL at 21:01

## 2019-11-28 RX ADMIN — NICOTINE POLACRILEX 8 MG: 4 GUM, CHEWING ORAL at 13:49

## 2019-11-28 RX ADMIN — ACETAMINOPHEN 650 MG: 325 TABLET, FILM COATED ORAL at 21:01

## 2019-11-28 RX ADMIN — FLUPHENAZINE HYDROCHLORIDE 1 MG: 1 TABLET, FILM COATED ORAL at 08:54

## 2019-11-28 RX ADMIN — NICOTINE 1 PATCH: 14 PATCH, EXTENDED RELEASE TRANSDERMAL at 08:54

## 2019-11-28 RX ADMIN — MIRTAZAPINE 30 MG: 30 TABLET, FILM COATED ORAL at 21:01

## 2019-11-28 RX ADMIN — IPRATROPIUM BROMIDE 2 SPRAY: 21 SPRAY NASAL at 21:01

## 2019-11-28 RX ADMIN — NICOTINE POLACRILEX 8 MG: 4 GUM, CHEWING ORAL at 18:22

## 2019-11-28 RX ADMIN — BUSPIRONE HYDROCHLORIDE 10 MG: 10 TABLET ORAL at 08:55

## 2019-11-28 RX ADMIN — FLUPHENAZINE HYDROCHLORIDE 1 MG: 1 TABLET, FILM COATED ORAL at 19:13

## 2019-11-28 RX ADMIN — BUSPIRONE HYDROCHLORIDE 10 MG: 10 TABLET ORAL at 16:51

## 2019-11-28 RX ADMIN — GABAPENTIN 800 MG: 400 CAPSULE ORAL at 21:01

## 2019-11-28 RX ADMIN — Medication 1 MG: at 13:49

## 2019-11-28 RX ADMIN — NICOTINE POLACRILEX 8 MG: 4 GUM, CHEWING ORAL at 22:21

## 2019-11-28 RX ADMIN — Medication 1 MG: at 08:54

## 2019-11-28 RX ADMIN — LITHIUM CARBONATE 300 MG: 300 TABLET, EXTENDED RELEASE ORAL at 08:54

## 2019-11-28 RX ADMIN — GABAPENTIN 800 MG: 400 CAPSULE ORAL at 13:49

## 2019-11-28 RX ADMIN — PRAZOSIN HYDROCHLORIDE 2 MG: 2 CAPSULE ORAL at 21:01

## 2019-11-28 RX ADMIN — LITHIUM CARBONATE 900 MG: 450 TABLET, EXTENDED RELEASE ORAL at 21:01

## 2019-11-28 RX ADMIN — ACETAMINOPHEN 650 MG: 325 TABLET, FILM COATED ORAL at 16:51

## 2019-11-28 RX ADMIN — ONDANSETRON HYDROCHLORIDE 4 MG: 4 TABLET, FILM COATED ORAL at 22:45

## 2019-11-28 RX ADMIN — GABAPENTIN 800 MG: 400 CAPSULE ORAL at 08:54

## 2019-11-28 RX ADMIN — FEXOFENADINE HCL 60 MG: 60 TABLET, FILM COATED ORAL at 08:54

## 2019-11-28 RX ADMIN — ACETAMINOPHEN 650 MG: 325 TABLET, FILM COATED ORAL at 08:54

## 2019-11-28 RX ADMIN — BENZTROPINE MESYLATE 1 MG: 1 TABLET ORAL at 03:59

## 2019-11-28 RX ADMIN — NICOTINE POLACRILEX 8 MG: 4 GUM, CHEWING ORAL at 08:54

## 2019-11-28 RX ADMIN — ACETAMINOPHEN 650 MG: 325 TABLET, FILM COATED ORAL at 12:41

## 2019-11-28 RX ADMIN — NICOTINE POLACRILEX 8 MG: 4 GUM, CHEWING ORAL at 11:15

## 2019-11-28 ASSESSMENT — ACTIVITIES OF DAILY LIVING (ADL)
LAUNDRY: WITH SUPERVISION
DRESS: INDEPENDENT
ORAL_HYGIENE: INDEPENDENT
HYGIENE/GROOMING: INDEPENDENT;SHOWER
ORAL_HYGIENE: INDEPENDENT
HYGIENE/GROOMING: INDEPENDENT
DRESS: INDEPENDENT;STREET CLOTHES

## 2019-11-28 ASSESSMENT — MIFFLIN-ST. JEOR: SCORE: 1708.82

## 2019-11-28 NOTE — PROGRESS NOTES
" 11/27/19 1400   Art Therapy   Type of Intervention structured groups   Response Participates with encouragement   Hours 1   Treatment Detail    (Art Therapy)- gratitude   Goal- cope, express, regulate and reflect through Art Therapy directives     Outcome- Pt participated in group. She was pleasant, cooperative and social. 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 At other times she is proud of her work. She talked with insight about how she was not going home for thanksgiving and making a good decision for her mental health .She talked about being pleased family would visit tomorrow . She also talked about wanting parents to bring her red bull , the only thing that gets her through is red bull and being \" high\" here (on red bull\"). She said she is grateful for her family and therapists.              "

## 2019-11-28 NOTE — PROGRESS NOTES
During this shift, Ayana asked this writer for an energy drink from her locker. This writer asked pt if she had already had one today. When pt stated that she had one this morning, this writer asked if she was supposed to be having more than one per day. Pt replied she could. When this writer came back out with the energy drink, pt was talking to another staff about how she had lied to this writer about her allowance of energy drinks per day. Pt told this writer that she knew she was only allowed one energy drink per day and apologized for lying to this staff. RN notified.

## 2019-11-28 NOTE — PROGRESS NOTES
"Pt visible in milieu, states day is going well and is waiting for a bed to open at an I[facility].  States she has SI with a plan which is to cheek lithium and overdose on it.  Pt hasn't had any urges and stated that she would come to staff if she had any.  Rates anxiety at an 8/10.  Ate meals, cleaned room and showered.       11/28/19 1500   Behavioral Health   Hallucinations denies / not responding to hallucinations   Thinking distractable   Orientation place: oriented;date: oriented   Memory baseline memory   Insight poor   Judgement impaired   Eye Contact at examiner   Affect full range affect   Mood anxious   Physical Appearance/Attire attire appropriate to age and situation   Hygiene well groomed   Suicidality thoughts and plan   1. Wish to be Dead (Recent) Yes   Wish to be Dead Description (Recent)   (\"yes\")   2. Non-Specific Active Suicidal Thoughts (Recent) Yes   Non-Specific Active Suicidal Thought Description (Recent)   (cheek lithium and overdose on it)   Self Injury other (see comment)  (denies)   Activity other (see comment)  (present in milieu)   Speech clear;coherent   Medication Sensitivity no observed side effects   Psychomotor / Gait balanced;steady   Psycho Education   Type of Intervention 1:1 intervention   Response participates, initiates socially appropriate   Hours 0.5   Treatment Detail check in   Activities of Daily Living   Hygiene/Grooming independent;shower   Oral Hygiene independent   Dress independent;street clothes   Room Organization independent         "
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
## 2019-11-28 NOTE — PROGRESS NOTES
"Ayana Prasad is a 29 year old female patient.  1. Schizoaffective disorder, bipolar type (H)    2. Borderline personality disorder (H)      Past Medical History:   Diagnosis Date     ADHD (attention deficit hyperactivity disorder)      Bipolar 1 disorder      Borderline personality disorder      Cauda equina syndrome      Chronic low back pain      Depression      Depressive disorder      GERD (gastroesophageal reflux disease)      h/o TBI (traumatic brain injury)      Marginal corneal ulcer, left 7/17/2015     Nephrolithiasis      Polysubstance abuse - methamphetamine, hallucinagen, heroin, marijuana     currently in remission     PONV (postoperative nausea and vomiting)      PTSD (post-traumatic stress disorder)      No current outpatient medications on file.     Allergies   Allergen Reactions     Haldol [Haloperidol] Other (See Comments)     Makes patient very angry and anxious     Adhesive Tape Hives     Percocet [Oxycodone-Acetaminophen] Nausea and Vomiting     Prednisone Other (See Comments) and Hives     Suicidal ideation     Risperidone Other (See Comments)     Tramadol Hcl Nausea and Vomiting     Droperidol Anxiety     Seroquel [Quetiapine] Palpitations     Spent 2 weeks in the hospital due to having seroquel, caused palpitations and QT prolongation     Active Problems:    Suicidal ideation    Blood pressure 112/79, pulse 108, temperature 97.6  F (36.4  C), temperature source Tympanic, resp. rate 16, height 1.651 m (5' 5\"), weight 98.3 kg (216 lb 11.2 oz), SpO2 97 %, not currently breastfeeding.    Subjective   \"Doing OK today\"    Objective   Patient interviewed.  Dressed neatly in street clothes.  OxPPT.  Mood - OK.  Affect - appropriate, cooperative.  Speech - normal volume and rate.  Motor - WNL.  TP- linear, organized.  TC - denies HI.  Reports AH and SI but not current plan.  Reports throwing a chair last night because she was frustrated with the voices.    Assessment & Plan  Dx: Borderline " Personality d/o  Pending stabilization for a safe discharge plan most likely to IRTS.    Guille Vallejo MD  11/28/2019    Lithium level 0.81.

## 2019-11-29 PROCEDURE — 25000132 ZZH RX MED GY IP 250 OP 250 PS 637: Performed by: STUDENT IN AN ORGANIZED HEALTH CARE EDUCATION/TRAINING PROGRAM

## 2019-11-29 PROCEDURE — 12400001 ZZH R&B MH UMMC

## 2019-11-29 PROCEDURE — 25000128 H RX IP 250 OP 636: Performed by: STUDENT IN AN ORGANIZED HEALTH CARE EDUCATION/TRAINING PROGRAM

## 2019-11-29 PROCEDURE — 25000132 ZZH RX MED GY IP 250 OP 250 PS 637: Performed by: PSYCHIATRY & NEUROLOGY

## 2019-11-29 PROCEDURE — 90832 PSYTX W PT 30 MINUTES: CPT

## 2019-11-29 RX ADMIN — Medication 1 MG: at 10:29

## 2019-11-29 RX ADMIN — QUETIAPINE FUMARATE 100 MG: 100 TABLET ORAL at 20:01

## 2019-11-29 RX ADMIN — NICOTINE POLACRILEX 8 MG: 4 GUM, CHEWING ORAL at 18:34

## 2019-11-29 RX ADMIN — GABAPENTIN 800 MG: 400 CAPSULE ORAL at 10:28

## 2019-11-29 RX ADMIN — GABAPENTIN 800 MG: 400 CAPSULE ORAL at 14:05

## 2019-11-29 RX ADMIN — NICOTINE POLACRILEX 8 MG: 4 GUM, CHEWING ORAL at 12:33

## 2019-11-29 RX ADMIN — PRAZOSIN HYDROCHLORIDE 2 MG: 2 CAPSULE ORAL at 20:02

## 2019-11-29 RX ADMIN — FLUPHENAZINE HYDROCHLORIDE 1 MG: 1 TABLET, FILM COATED ORAL at 10:28

## 2019-11-29 RX ADMIN — BUSPIRONE HYDROCHLORIDE 10 MG: 10 TABLET ORAL at 10:28

## 2019-11-29 RX ADMIN — GABAPENTIN 800 MG: 400 CAPSULE ORAL at 20:00

## 2019-11-29 RX ADMIN — ONDANSETRON HYDROCHLORIDE 4 MG: 4 TABLET, FILM COATED ORAL at 13:30

## 2019-11-29 RX ADMIN — Medication 1 MG: at 14:06

## 2019-11-29 RX ADMIN — ACETAMINOPHEN 650 MG: 325 TABLET, FILM COATED ORAL at 10:26

## 2019-11-29 RX ADMIN — ACETAMINOPHEN 650 MG: 325 TABLET, FILM COATED ORAL at 22:11

## 2019-11-29 RX ADMIN — NICOTINE POLACRILEX 8 MG: 4 GUM, CHEWING ORAL at 10:31

## 2019-11-29 RX ADMIN — ACETAMINOPHEN 650 MG: 325 TABLET, FILM COATED ORAL at 17:09

## 2019-11-29 RX ADMIN — NICOTINE POLACRILEX 8 MG: 4 GUM, CHEWING ORAL at 13:30

## 2019-11-29 RX ADMIN — FLUPHENAZINE HYDROCHLORIDE 1 MG: 1 TABLET, FILM COATED ORAL at 18:54

## 2019-11-29 RX ADMIN — FEXOFENADINE HCL 60 MG: 60 TABLET, FILM COATED ORAL at 10:28

## 2019-11-29 RX ADMIN — BUSPIRONE HYDROCHLORIDE 10 MG: 10 TABLET ORAL at 17:09

## 2019-11-29 RX ADMIN — LITHIUM CARBONATE 900 MG: 450 TABLET, EXTENDED RELEASE ORAL at 20:00

## 2019-11-29 RX ADMIN — NICOTINE 1 PATCH: 14 PATCH, EXTENDED RELEASE TRANSDERMAL at 10:30

## 2019-11-29 RX ADMIN — ACETAMINOPHEN 650 MG: 325 TABLET, FILM COATED ORAL at 19:59

## 2019-11-29 RX ADMIN — ACETAMINOPHEN 650 MG: 325 TABLET, FILM COATED ORAL at 14:05

## 2019-11-29 RX ADMIN — BENZTROPINE MESYLATE 1 MG: 1 TABLET ORAL at 17:38

## 2019-11-29 RX ADMIN — LITHIUM CARBONATE 300 MG: 300 TABLET, EXTENDED RELEASE ORAL at 10:29

## 2019-11-29 RX ADMIN — IPRATROPIUM BROMIDE 2 SPRAY: 21 SPRAY NASAL at 10:31

## 2019-11-29 RX ADMIN — MIRTAZAPINE 30 MG: 30 TABLET, FILM COATED ORAL at 20:03

## 2019-11-29 ASSESSMENT — ACTIVITIES OF DAILY LIVING (ADL)
LAUNDRY: WITH SUPERVISION
ORAL_HYGIENE: INDEPENDENT
ORAL_HYGIENE: INDEPENDENT
HYGIENE/GROOMING: INDEPENDENT
LAUNDRY: WITH SUPERVISION
DRESS: STREET CLOTHES
HYGIENE/GROOMING: INDEPENDENT
DRESS: STREET CLOTHES

## 2019-11-29 NOTE — PROGRESS NOTES
"   11/29/19 1400   Behavioral Health   Hallucinations auditory   Thinking intact   Orientation time: oriented;date: oriented;place: oriented;person: oriented   Memory baseline memory   Insight insight appropriate to events;insight appropriate to situation   Judgement intact   Eye Contact at examiner   Affect full range affect   Mood mood is calm   Physical Appearance/Attire attire appropriate to age and situation;appears stated age   Hygiene well groomed   Suicidality   (pt denies)   1. Wish to be Dead (Recent) No   2. Non-Specific Active Suicidal Thoughts (Recent) No   3. Active Sucidal Ideation with any Methods (Not Plan) Without Intent to Act (Recent) No   4. Active Suicidal Ideation with Some Intent to Act, Without Specific Plan (Recent) No   5. Active Suicidal Ideation with Specific Plan and Intent (Recent) No   Self Injury   (pt denies)   Elopement Statements about wanting to leave   Activity   (active )   Speech coherent;clear   Medication Sensitivity no observed side effects;no stated side effects   Psychomotor / Gait steady;balanced   Activities of Daily Living   Hygiene/Grooming independent   Oral Hygiene independent   Dress street clothes   Laundry with supervision   Room Organization independent     Pt was sleeping until about 10:30 am.  She reported no mental health symptoms and no SI/SIB/and HI except AH.  Pt reported having AH and they are good and bad; \"when they are bad, they say mean hurtful things to me.  They say you're worthless go kill yourself.\"  She also reported that they have been present since her sophomore year in college and she doesn't know what she would do if they stopped.  Pt is independent with her ADL's and reported her concentration is alright.    "

## 2019-11-29 NOTE — CONSULTS
Inpatient Diagnostic Assessment order acknowledged.  Patient is anticipated to be seen on possibly next Monday unable to see today if schedule allows.    Temitope Bond, LICSW

## 2019-11-29 NOTE — PROGRESS NOTES
Yuly from outpatient diagnostic center called to obtain more info for pt's discharge plan. Yuly will visit pt some time today. Yuly would like to speak with a CTC but no CTC on unit today. Yuly will connect with CTC later.

## 2019-11-29 NOTE — PROGRESS NOTES
11/28/19 7250   Group Therapy Session   Group Attendance attended group session   Total Time (minutes) 30   Group Type psychotherapeutic   Group Topic Covered other (see comments)   Patient Participation/Contribution cooperative with task;discussed personal experience with topic   Focus was communication and group cohesion. Pt actively participated in group and processed things for which she felt thankful while playing a game.  Ayana presented in a pleasant mood and was able to have an open, cooperative dialogue.

## 2019-11-29 NOTE — PROGRESS NOTES
Pt spent most of the time hanging out in lounge watching TV and socializing with peers. Pt went to group. Pt reported feeling some anxiety, rated it a 5/10. Pt told this writer that she had been learning how to prioritize herself in her life, saying that making the decision to stay here was her first step. Pt also endorsed hearing negative voices. Pt denied SI, SIB and HI.          11/28/19 2200   Behavioral Health   Hallucinations auditory   Thinking distractable   Orientation person: oriented;place: oriented   Memory baseline memory   Insight admits / accepts   Judgement impaired   Eye Contact at examiner   Affect full range affect   Mood mood is calm;anxious   Physical Appearance/Attire attire appropriate to age and situation   Hygiene neglected grooming - unclean body, hair, teeth   1. Wish to be Dead (Recent) No   2. Non-Specific Active Suicidal Thoughts (Recent) No   Self Injury   (None stated or observed)   Elopement   (None stated or observed)   Activity   (sociable)   Speech clear;coherent   Medication Sensitivity no observed side effects;no stated side effects   Psychomotor / Gait balanced;steady   Psycho Education   Type of Intervention 1:1 intervention   Response participates, initiates socially appropriate   Hours 0.5   Treatment Detail check in   Activities of Daily Living   Hygiene/Grooming independent   Oral Hygiene independent   Dress independent   Laundry with supervision   Room Organization independent

## 2019-11-30 PROCEDURE — 25000132 ZZH RX MED GY IP 250 OP 250 PS 637: Performed by: PSYCHIATRY & NEUROLOGY

## 2019-11-30 PROCEDURE — 25000132 ZZH RX MED GY IP 250 OP 250 PS 637: Performed by: STUDENT IN AN ORGANIZED HEALTH CARE EDUCATION/TRAINING PROGRAM

## 2019-11-30 PROCEDURE — 12400001 ZZH R&B MH UMMC

## 2019-11-30 PROCEDURE — H2032 ACTIVITY THERAPY, PER 15 MIN: HCPCS

## 2019-11-30 RX ADMIN — BUSPIRONE HYDROCHLORIDE 10 MG: 10 TABLET ORAL at 16:39

## 2019-11-30 RX ADMIN — NICOTINE POLACRILEX 8 MG: 4 GUM, CHEWING ORAL at 14:52

## 2019-11-30 RX ADMIN — NICOTINE POLACRILEX 8 MG: 4 GUM, CHEWING ORAL at 21:53

## 2019-11-30 RX ADMIN — ACETAMINOPHEN 650 MG: 325 TABLET, FILM COATED ORAL at 11:35

## 2019-11-30 RX ADMIN — FLUPHENAZINE HYDROCHLORIDE 1 MG: 1 TABLET, FILM COATED ORAL at 11:36

## 2019-11-30 RX ADMIN — FEXOFENADINE HCL 60 MG: 60 TABLET, FILM COATED ORAL at 11:36

## 2019-11-30 RX ADMIN — NICOTINE POLACRILEX 8 MG: 4 GUM, CHEWING ORAL at 11:36

## 2019-11-30 RX ADMIN — NICOTINE 1 PATCH: 14 PATCH, EXTENDED RELEASE TRANSDERMAL at 11:36

## 2019-11-30 RX ADMIN — GABAPENTIN 800 MG: 400 CAPSULE ORAL at 11:36

## 2019-11-30 RX ADMIN — GABAPENTIN 800 MG: 400 CAPSULE ORAL at 22:28

## 2019-11-30 RX ADMIN — MIRTAZAPINE 30 MG: 30 TABLET, FILM COATED ORAL at 22:29

## 2019-11-30 RX ADMIN — LITHIUM CARBONATE 300 MG: 300 TABLET, EXTENDED RELEASE ORAL at 11:36

## 2019-11-30 RX ADMIN — Medication 1 MG: at 16:38

## 2019-11-30 RX ADMIN — GABAPENTIN 800 MG: 400 CAPSULE ORAL at 16:38

## 2019-11-30 RX ADMIN — IPRATROPIUM BROMIDE 2 SPRAY: 21 SPRAY NASAL at 16:37

## 2019-11-30 RX ADMIN — ACETAMINOPHEN 650 MG: 325 TABLET, FILM COATED ORAL at 22:25

## 2019-11-30 RX ADMIN — NICOTINE POLACRILEX 8 MG: 4 GUM, CHEWING ORAL at 18:47

## 2019-11-30 RX ADMIN — BUSPIRONE HYDROCHLORIDE 10 MG: 10 TABLET ORAL at 11:38

## 2019-11-30 RX ADMIN — PRAZOSIN HYDROCHLORIDE 2 MG: 2 CAPSULE ORAL at 22:29

## 2019-11-30 RX ADMIN — ACETAMINOPHEN 650 MG: 325 TABLET, FILM COATED ORAL at 16:37

## 2019-11-30 RX ADMIN — NICOTINE POLACRILEX 8 MG: 4 GUM, CHEWING ORAL at 16:39

## 2019-11-30 RX ADMIN — NICOTINE POLACRILEX 8 MG: 4 GUM, CHEWING ORAL at 12:57

## 2019-11-30 RX ADMIN — QUETIAPINE FUMARATE 100 MG: 100 TABLET ORAL at 22:30

## 2019-11-30 RX ADMIN — LITHIUM CARBONATE 900 MG: 450 TABLET, EXTENDED RELEASE ORAL at 22:27

## 2019-11-30 RX ADMIN — FLUPHENAZINE HYDROCHLORIDE 1 MG: 1 TABLET, FILM COATED ORAL at 22:27

## 2019-11-30 RX ADMIN — Medication 1 MG: at 11:35

## 2019-11-30 RX ADMIN — NICOTINE POLACRILEX 8 MG: 4 GUM, CHEWING ORAL at 19:55

## 2019-11-30 ASSESSMENT — ACTIVITIES OF DAILY LIVING (ADL)
DRESS: INDEPENDENT
LAUNDRY: WITH SUPERVISION
ORAL_HYGIENE: INDEPENDENT
LAUNDRY: WITH SUPERVISION
HYGIENE/GROOMING: INDEPENDENT
HYGIENE/GROOMING: INDEPENDENT
DRESS: INDEPENDENT
ORAL_HYGIENE: INDEPENDENT

## 2019-11-30 NOTE — PROGRESS NOTES
"Pt participated in an extended individual psychotherapy using dance/movement therapy intervention for excessive energy.  Pt was still craving her \"energy drink\" but was still able to complete a longer session demonstrating some self-regulation.    Pt addressed issues within her current primary partnership as well as family members.  She was able to share the perspective on her family-members regarding the effects of her substance abuse, including an intervention by her older brother (currently living in [place].)  Pt said she left detox then next day.  Pt still states she is not ready to be drug-free.       11/29/19 1500   Dance Movement Therapy   Type of Intervention 1:1 intervention   Response participates with encouragement   Hours 1.5       "

## 2019-11-30 NOTE — PROGRESS NOTES
11/30/19 1400   Behavioral Health   Hallucinations auditory   Thinking poor concentration   Orientation person: oriented;place: oriented;date: oriented;time: oriented   Memory baseline memory   Insight poor   Judgement impaired   Eye Contact at examiner   Affect full range affect   Mood mood is calm   Physical Appearance/Attire attire appropriate to age and situation   Hygiene well groomed   Suicidality other (see comments)  (None stated or observed)   1. Wish to be Dead (Recent) No   2. Non-Specific Active Suicidal Thoughts (Recent) No   Change in Protective Factors? No   Enviromental Risk Factors None   Self Injury other (see comment)  (None stated or observed)   Activity other (see comment)  (Visible in milieu)   Speech clear;coherent   Medication Sensitivity no stated side effects;no observed side effects   Psychomotor / Gait balanced;steady   Activities of Daily Living   Hygiene/Grooming independent   Oral Hygiene independent   Dress independent   Laundry with supervision   Room Organization independent     Pt. Was visible and social in the milieu throughout the day, and didn't have any outstanding events throughout the day. There was no stated SISIB.

## 2019-11-30 NOTE — PLAN OF CARE
"Pt presents with full range affect and irritable mood. Reports increased anxiety regarding her situation, she reports wanting to go home, but yet understanding that she needs to be here. Pt reports chronic suicidal thoughts, but no active plan. Denies SIB. States that she has chronic auditory hallucinations. Pt spent the shift in the milieu interacting with other patients. Pt became frustrated many times throughout the evening and states that she believes it is related to her caffeine consumption. Pt reports that she feels like she is going to \"hulk out\". She states that she is going to attempt to cut back on her caffeine usage this weekend. Pt did attend 1:1 therapy this evening and describes it as beneficial to her. No other concerns or complaints this evening.   "

## 2019-12-01 PROCEDURE — 25000132 ZZH RX MED GY IP 250 OP 250 PS 637: Performed by: STUDENT IN AN ORGANIZED HEALTH CARE EDUCATION/TRAINING PROGRAM

## 2019-12-01 PROCEDURE — 25000132 ZZH RX MED GY IP 250 OP 250 PS 637: Performed by: PSYCHIATRY & NEUROLOGY

## 2019-12-01 PROCEDURE — 12400001 ZZH R&B MH UMMC

## 2019-12-01 PROCEDURE — 25000128 H RX IP 250 OP 636: Performed by: STUDENT IN AN ORGANIZED HEALTH CARE EDUCATION/TRAINING PROGRAM

## 2019-12-01 PROCEDURE — 90853 GROUP PSYCHOTHERAPY: CPT

## 2019-12-01 RX ADMIN — GABAPENTIN 800 MG: 400 CAPSULE ORAL at 11:27

## 2019-12-01 RX ADMIN — BENZTROPINE MESYLATE 1 MG: 1 TABLET ORAL at 21:44

## 2019-12-01 RX ADMIN — ACETAMINOPHEN 650 MG: 325 TABLET, FILM COATED ORAL at 11:27

## 2019-12-01 RX ADMIN — NICOTINE POLACRILEX 4 MG: 4 GUM, CHEWING ORAL at 16:36

## 2019-12-01 RX ADMIN — NICOTINE 1 PATCH: 14 PATCH, EXTENDED RELEASE TRANSDERMAL at 11:28

## 2019-12-01 RX ADMIN — PRAZOSIN HYDROCHLORIDE 2 MG: 2 CAPSULE ORAL at 21:04

## 2019-12-01 RX ADMIN — ACETAMINOPHEN 650 MG: 325 TABLET, FILM COATED ORAL at 21:04

## 2019-12-01 RX ADMIN — GABAPENTIN 800 MG: 400 CAPSULE ORAL at 21:05

## 2019-12-01 RX ADMIN — NICOTINE POLACRILEX 8 MG: 4 GUM, CHEWING ORAL at 23:25

## 2019-12-01 RX ADMIN — BUSPIRONE HYDROCHLORIDE 10 MG: 10 TABLET ORAL at 16:36

## 2019-12-01 RX ADMIN — QUETIAPINE FUMARATE 100 MG: 100 TABLET ORAL at 21:06

## 2019-12-01 RX ADMIN — IPRATROPIUM BROMIDE 2 SPRAY: 21 SPRAY NASAL at 14:55

## 2019-12-01 RX ADMIN — BUSPIRONE HYDROCHLORIDE 10 MG: 10 TABLET ORAL at 11:27

## 2019-12-01 RX ADMIN — LITHIUM CARBONATE 900 MG: 450 TABLET, EXTENDED RELEASE ORAL at 21:05

## 2019-12-01 RX ADMIN — MIRTAZAPINE 30 MG: 30 TABLET, FILM COATED ORAL at 21:05

## 2019-12-01 RX ADMIN — MENTHOL 1 PATCH: 205.5 PATCH TOPICAL at 13:53

## 2019-12-01 RX ADMIN — GABAPENTIN 800 MG: 400 CAPSULE ORAL at 16:36

## 2019-12-01 RX ADMIN — ACETAMINOPHEN 650 MG: 325 TABLET, FILM COATED ORAL at 16:36

## 2019-12-01 RX ADMIN — FEXOFENADINE HCL 60 MG: 60 TABLET, FILM COATED ORAL at 11:27

## 2019-12-01 RX ADMIN — Medication 1 MG: at 11:27

## 2019-12-01 RX ADMIN — FLUPHENAZINE HYDROCHLORIDE 1 MG: 1 TABLET, FILM COATED ORAL at 21:07

## 2019-12-01 RX ADMIN — ONDANSETRON HYDROCHLORIDE 4 MG: 4 TABLET, FILM COATED ORAL at 14:55

## 2019-12-01 RX ADMIN — LITHIUM CARBONATE 300 MG: 300 TABLET, EXTENDED RELEASE ORAL at 11:27

## 2019-12-01 RX ADMIN — NICOTINE POLACRILEX 8 MG: 4 GUM, CHEWING ORAL at 11:56

## 2019-12-01 RX ADMIN — NICOTINE POLACRILEX 8 MG: 4 GUM, CHEWING ORAL at 13:17

## 2019-12-01 RX ADMIN — FLUPHENAZINE HYDROCHLORIDE 1 MG: 1 TABLET, FILM COATED ORAL at 11:27

## 2019-12-01 ASSESSMENT — ACTIVITIES OF DAILY LIVING (ADL)
LAUNDRY: WITH SUPERVISION
ORAL_HYGIENE: INDEPENDENT
HYGIENE/GROOMING: INDEPENDENT
DRESS: INDEPENDENT
HYGIENE/GROOMING: INDEPENDENT
LAUNDRY: WITH SUPERVISION
ORAL_HYGIENE: INDEPENDENT
DRESS: INDEPENDENT

## 2019-12-01 NOTE — PROGRESS NOTES
12/01/19 1554   Behavioral Health   Hallucinations denies / not responding to hallucinations   Thinking distractable   Orientation place: oriented;person: oriented;time: oriented;date: oriented   Memory baseline memory   Insight insight appropriate to situation   Judgement impaired   Eye Contact at examiner   Affect full range affect   Mood mood is calm   Physical Appearance/Attire attire appropriate to age and situation   Hygiene well groomed   Suicidality other (see comments)  (Denied)   1. Wish to be Dead (Recent) No   2. Non-Specific Active Suicidal Thoughts (Recent) No   Self Injury other (see comment)  (Denied)   Elopement   (None Observed)   Activity other (see comment)  (Social and active in the milieu)   Speech clear;coherent   Medication Sensitivity no stated side effects   Psychomotor / Gait balanced;steady   Psycho Education   Type of Intervention 1:1 intervention   Response participates, initiates socially appropriate   Hours 0.5   Treatment Detail Check-In   Activities of Daily Living   Hygiene/Grooming independent   Oral Hygiene independent   Dress independent   Laundry with supervision   Room Organization independent     Pt was sleeping in her room until 1100. Pt stayed out in the milieu and socialized with peers after she woke her. Pt didn't report no questions or concerns. Pt also denied having SI and SIB thoughts.

## 2019-12-01 NOTE — PROGRESS NOTES
11/30/19 1400   Art Therapy   Type of Intervention structured groups   Response participates with encouragement   Hours 1   Treatment Detail    (Art Therapy)- gratitude   Goal- cope, express, regulate and reflect through Art Therapy directives     Outcome- Pt participated in group. She was a positive participant . She talks a lot about her energy drinks and then shows writer how her hand is shaking a lot. Writer shares that the energy drinks may be the cause of this. She seems to be using the energy drinks as an alternate addiction. She did a thoughtful gratitude project with a winter holiday theme. She also reported to writer that she had a very nice visit with her parents on Thanksgiving.

## 2019-12-02 PROCEDURE — 25000132 ZZH RX MED GY IP 250 OP 250 PS 637: Performed by: PSYCHIATRY & NEUROLOGY

## 2019-12-02 PROCEDURE — 25000132 ZZH RX MED GY IP 250 OP 250 PS 637: Performed by: STUDENT IN AN ORGANIZED HEALTH CARE EDUCATION/TRAINING PROGRAM

## 2019-12-02 PROCEDURE — H2032 ACTIVITY THERAPY, PER 15 MIN: HCPCS

## 2019-12-02 PROCEDURE — 99232 SBSQ HOSP IP/OBS MODERATE 35: CPT | Mod: GC | Performed by: PSYCHIATRY & NEUROLOGY

## 2019-12-02 PROCEDURE — 12400001 ZZH R&B MH UMMC

## 2019-12-02 RX ADMIN — NICOTINE POLACRILEX 6 MG: 4 GUM, CHEWING ORAL at 22:55

## 2019-12-02 RX ADMIN — NICOTINE 1 PATCH: 14 PATCH, EXTENDED RELEASE TRANSDERMAL at 09:20

## 2019-12-02 RX ADMIN — Medication 1 MG: at 09:19

## 2019-12-02 RX ADMIN — GABAPENTIN 800 MG: 400 CAPSULE ORAL at 09:18

## 2019-12-02 RX ADMIN — FLUPHENAZINE HYDROCHLORIDE 1 MG: 1 TABLET, FILM COATED ORAL at 09:18

## 2019-12-02 RX ADMIN — FLUPHENAZINE HYDROCHLORIDE 1 MG: 1 TABLET, FILM COATED ORAL at 20:39

## 2019-12-02 RX ADMIN — BUSPIRONE HYDROCHLORIDE 10 MG: 10 TABLET ORAL at 16:00

## 2019-12-02 RX ADMIN — Medication 1 MG: at 14:47

## 2019-12-02 RX ADMIN — NICOTINE POLACRILEX 8 MG: 4 GUM, CHEWING ORAL at 21:40

## 2019-12-02 RX ADMIN — NICOTINE POLACRILEX 4 MG: 4 GUM, CHEWING ORAL at 18:32

## 2019-12-02 RX ADMIN — ACETAMINOPHEN 650 MG: 325 TABLET, FILM COATED ORAL at 09:18

## 2019-12-02 RX ADMIN — NICOTINE POLACRILEX 6 MG: 4 GUM, CHEWING ORAL at 10:42

## 2019-12-02 RX ADMIN — GABAPENTIN 800 MG: 400 CAPSULE ORAL at 20:39

## 2019-12-02 RX ADMIN — NICOTINE POLACRILEX 6 MG: 4 GUM, CHEWING ORAL at 12:30

## 2019-12-02 RX ADMIN — GABAPENTIN 800 MG: 400 CAPSULE ORAL at 14:48

## 2019-12-02 RX ADMIN — NICOTINE POLACRILEX 8 MG: 4 GUM, CHEWING ORAL at 09:19

## 2019-12-02 RX ADMIN — ACETAMINOPHEN 650 MG: 325 TABLET, FILM COATED ORAL at 16:00

## 2019-12-02 RX ADMIN — LITHIUM CARBONATE 300 MG: 300 TABLET, EXTENDED RELEASE ORAL at 09:19

## 2019-12-02 RX ADMIN — ACETAMINOPHEN 650 MG: 325 TABLET, FILM COATED ORAL at 20:39

## 2019-12-02 RX ADMIN — ACETAMINOPHEN 650 MG: 325 TABLET, FILM COATED ORAL at 12:30

## 2019-12-02 RX ADMIN — BUSPIRONE HYDROCHLORIDE 10 MG: 10 TABLET ORAL at 09:18

## 2019-12-02 RX ADMIN — LITHIUM CARBONATE 900 MG: 450 TABLET, EXTENDED RELEASE ORAL at 20:39

## 2019-12-02 RX ADMIN — MIRTAZAPINE 30 MG: 30 TABLET, FILM COATED ORAL at 20:39

## 2019-12-02 RX ADMIN — FEXOFENADINE HCL 60 MG: 60 TABLET, FILM COATED ORAL at 09:19

## 2019-12-02 RX ADMIN — NICOTINE POLACRILEX 6 MG: 4 GUM, CHEWING ORAL at 14:47

## 2019-12-02 RX ADMIN — PRAZOSIN HYDROCHLORIDE 2 MG: 2 CAPSULE ORAL at 20:39

## 2019-12-02 RX ADMIN — QUETIAPINE FUMARATE 100 MG: 100 TABLET ORAL at 20:39

## 2019-12-02 ASSESSMENT — ACTIVITIES OF DAILY LIVING (ADL)
HYGIENE/GROOMING: INDEPENDENT
LAUNDRY: WITH SUPERVISION
ORAL_HYGIENE: INDEPENDENT
DRESS: INDEPENDENT

## 2019-12-02 NOTE — PROGRESS NOTES
"Patient c/o some pain to tailbone area today. Wondered if this could be related to her \"bulging discs\". She was given scheduled Tylenol with minimal relief. Offered cold packs which she declined.   "

## 2019-12-02 NOTE — PLAN OF CARE
Ayana has been more even in mood,some touch and boundary issues remain but she seems to be making a sincere effort to comply with care plan and unit activities,she states that she is looking forward to moving to an irts and feels positive about the future of her marriage relationship,per team

## 2019-12-02 NOTE — PROGRESS NOTES
Review of record indicates treatment team is moving towards IRTS as discharge plan. Will not be seen for the outpatient DDP program at this time.

## 2019-12-02 NOTE — PROGRESS NOTES
Pt has been present in the milieu. She is sociable towards peers/staff and has attended groups. She has had a bright affect this shift and was pretty calm in mood. During check in, pt stated she has a little bit of auditory hallucinations. She said her AH has been better today and is hearing murmurs rather than commands. Pt denies any SI/SIB.        12/02/19 1300   Behavioral Health   Hallucinations auditory   Thinking poor concentration   Orientation person: oriented;place: oriented   Memory baseline memory   Insight insight appropriate to situation   Judgement impaired   Eye Contact at examiner   Affect full range affect   Mood mood is calm

## 2019-12-02 NOTE — PROGRESS NOTES
"   12/01/19 0650   Group Therapy Session   Group Attendance attended group session   Total Time (minutes) 35   Group Type psychotherapeutic   Group Topic Covered other (see comments)   Patient Participation/Contribution cooperative with task;discussed personal experience with topic;listened actively   Psychotherapy group goals: Build strengths and resilience by identifying positives about oneself \"I am, I have, I can\" and then process as a group.  Ayana was in a bright, silly mood.  She expressed feeling disappointed that the TV movie they were watching had to be turned of during group and jokingly stated, \"I'm boycotting group.\" She did join group, participated (teased that she would not but did participate), and demonstrated positive social engagement with others.  "

## 2019-12-02 NOTE — PROGRESS NOTES
11/30/19 2300   Behavioral Health   Hallucinations auditory   Thinking distractable   Orientation person: oriented;place: oriented   Memory baseline memory   Insight poor   Judgement impaired   Eye Contact at examiner   Affect full range affect   Mood mood is calm   Physical Appearance/Attire neat   Hygiene well groomed   Suicidality other (see comments)  (denies)   1. Wish to be Dead (Recent) No   2. Non-Specific Active Suicidal Thoughts (Recent) No   Self Injury other (see comment)  (denies)   Activity other (see comment)  (visible in the milieu and socialized with others)   Speech clear;coherent   Activities of Daily Living   Hygiene/Grooming independent   Oral Hygiene independent   Dress independent   Laundry with supervision   Room Organization independent     Pt denied SI and SIB.  Pt ate supper, visible in the milieu and socialized with others.  Pt is independent with ADL's.
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
## 2019-12-03 PROCEDURE — 12400001 ZZH R&B MH UMMC

## 2019-12-03 PROCEDURE — G0177 OPPS/PHP; TRAIN & EDUC SERV: HCPCS

## 2019-12-03 PROCEDURE — 25000132 ZZH RX MED GY IP 250 OP 250 PS 637: Performed by: PSYCHIATRY & NEUROLOGY

## 2019-12-03 PROCEDURE — 25000132 ZZH RX MED GY IP 250 OP 250 PS 637: Performed by: STUDENT IN AN ORGANIZED HEALTH CARE EDUCATION/TRAINING PROGRAM

## 2019-12-03 PROCEDURE — H2032 ACTIVITY THERAPY, PER 15 MIN: HCPCS

## 2019-12-03 PROCEDURE — 99232 SBSQ HOSP IP/OBS MODERATE 35: CPT | Mod: GC | Performed by: PSYCHIATRY & NEUROLOGY

## 2019-12-03 RX ORDER — BENZOYL PEROXIDE 5 G/100G
GEL TOPICAL AT BEDTIME
Status: DISCONTINUED | OUTPATIENT
Start: 2019-12-03 | End: 2019-12-06

## 2019-12-03 RX ORDER — BUSPIRONE HYDROCHLORIDE 10 MG/1
10 TABLET ORAL 3 TIMES DAILY
Status: DISCONTINUED | OUTPATIENT
Start: 2019-12-03 | End: 2019-12-17 | Stop reason: HOSPADM

## 2019-12-03 RX ADMIN — MIRTAZAPINE 30 MG: 30 TABLET, FILM COATED ORAL at 21:02

## 2019-12-03 RX ADMIN — NICOTINE POLACRILEX 8 MG: 4 GUM, CHEWING ORAL at 12:03

## 2019-12-03 RX ADMIN — NICOTINE POLACRILEX 6 MG: 4 GUM, CHEWING ORAL at 19:58

## 2019-12-03 RX ADMIN — Medication 1 MG: at 13:24

## 2019-12-03 RX ADMIN — FLUPHENAZINE HYDROCHLORIDE 1 MG: 1 TABLET, FILM COATED ORAL at 21:02

## 2019-12-03 RX ADMIN — LITHIUM CARBONATE 300 MG: 300 TABLET, EXTENDED RELEASE ORAL at 09:37

## 2019-12-03 RX ADMIN — GABAPENTIN 800 MG: 400 CAPSULE ORAL at 21:04

## 2019-12-03 RX ADMIN — BUSPIRONE HYDROCHLORIDE 10 MG: 10 TABLET ORAL at 19:58

## 2019-12-03 RX ADMIN — BENZOYL PEROXIDE: 50 GEL TOPICAL at 18:39

## 2019-12-03 RX ADMIN — Medication 1 MG: at 09:35

## 2019-12-03 RX ADMIN — NICOTINE POLACRILEX 4 MG: 4 GUM, CHEWING ORAL at 16:52

## 2019-12-03 RX ADMIN — NICOTINE 1 PATCH: 14 PATCH, EXTENDED RELEASE TRANSDERMAL at 09:35

## 2019-12-03 RX ADMIN — LITHIUM CARBONATE 900 MG: 450 TABLET, EXTENDED RELEASE ORAL at 21:02

## 2019-12-03 RX ADMIN — ACETAMINOPHEN 650 MG: 325 TABLET, FILM COATED ORAL at 16:52

## 2019-12-03 RX ADMIN — IPRATROPIUM BROMIDE 2 SPRAY: 21 SPRAY NASAL at 21:03

## 2019-12-03 RX ADMIN — NICOTINE POLACRILEX 6 MG: 4 GUM, CHEWING ORAL at 16:50

## 2019-12-03 RX ADMIN — ACETAMINOPHEN 650 MG: 325 TABLET, FILM COATED ORAL at 12:03

## 2019-12-03 RX ADMIN — FEXOFENADINE HCL 60 MG: 60 TABLET, FILM COATED ORAL at 09:34

## 2019-12-03 RX ADMIN — QUETIAPINE FUMARATE 100 MG: 100 TABLET ORAL at 21:02

## 2019-12-03 RX ADMIN — NICOTINE POLACRILEX 6 MG: 4 GUM, CHEWING ORAL at 22:40

## 2019-12-03 RX ADMIN — GABAPENTIN 800 MG: 400 CAPSULE ORAL at 13:24

## 2019-12-03 RX ADMIN — ACETAMINOPHEN 650 MG: 325 TABLET, FILM COATED ORAL at 19:58

## 2019-12-03 RX ADMIN — NICOTINE POLACRILEX 4 MG: 4 GUM, CHEWING ORAL at 10:32

## 2019-12-03 RX ADMIN — GABAPENTIN 800 MG: 400 CAPSULE ORAL at 09:35

## 2019-12-03 RX ADMIN — BUSPIRONE HYDROCHLORIDE 10 MG: 10 TABLET ORAL at 16:52

## 2019-12-03 RX ADMIN — NICOTINE POLACRILEX 6 MG: 4 GUM, CHEWING ORAL at 13:13

## 2019-12-03 RX ADMIN — BUSPIRONE HYDROCHLORIDE 10 MG: 10 TABLET ORAL at 09:34

## 2019-12-03 RX ADMIN — NICOTINE POLACRILEX 6 MG: 4 GUM, CHEWING ORAL at 21:05

## 2019-12-03 RX ADMIN — FLUPHENAZINE HYDROCHLORIDE 1 MG: 1 TABLET, FILM COATED ORAL at 09:35

## 2019-12-03 RX ADMIN — MENTHOL 1 PATCH: 205.5 PATCH TOPICAL at 22:46

## 2019-12-03 RX ADMIN — PRAZOSIN HYDROCHLORIDE 2 MG: 2 CAPSULE ORAL at 21:02

## 2019-12-03 RX ADMIN — ACETAMINOPHEN 650 MG: 325 TABLET, FILM COATED ORAL at 09:34

## 2019-12-03 RX ADMIN — NICOTINE POLACRILEX 6 MG: 4 GUM, CHEWING ORAL at 18:37

## 2019-12-03 RX ADMIN — MENTHOL 1 PATCH: 205.5 PATCH TOPICAL at 14:18

## 2019-12-03 ASSESSMENT — MIFFLIN-ST. JEOR: SCORE: 1739.67

## 2019-12-03 NOTE — PROGRESS NOTES
"   12/02/19 2200   General Information   Art Directive other (see comments)   AT directive was to create an object/symbol using modeling mackenzie as a visual introduction of self to group. Goals of directive: identifying personal strengths and goals, creating a personal symbol or self narrative, emotional expression, coping through art. Pt created two small pine trees and a lake out of mackenzie. Pt then created another object and shared with group that \"I like to smoke a blunt by the lake.\" Pt also pushed limits at other times during the group and needed redirection for swearing multiple times as well as drug glorification. Pt met briefly with author 1:1 to discuss behavior in group. Pt apologized multiple times to author, saying that she is bored on the unit. Pt asked author if she was in trouble for her behavior and asked if author would chart about her artwork (drug content). Pt said that she does not want to stay on the unit longer because of her actions in group. Pt and author discussed ways that pt can stay occupied with activities, journaling, art on the unit and help with her boredom. Author also set limits with pt saying that inappropriate subject matter will not be tolerated in a group therapy session.  "
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
## 2019-12-03 NOTE — PROGRESS NOTES
"Ayana  attended 2 of 3 OT groups today. Irritable; appeared mostly disinterested in group activities & discussion this date. During a reflection group this morning, she discussed her challenge of \"having many strong emotions\" in a day; was vaguely receptive to this writer's suggestion that overall she appears to be seeking greater peace amidst the emotional turmoil. Discussed her appreciation of TIPS skills, especially ice packs, while dealing with the stress of being on the unit.      12/03/19 1500   Occupational Therapy   Type of Intervention structured groups   Response Observes only   Hours 2     "
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
## 2019-12-03 NOTE — PROGRESS NOTES
Typical day. Pleasant and social with peers. Patient stated she was having a good day. Denies symptoms. Attending groups.          12/03/19 4142   Behavioral Health   Hallucinations denies / not responding to hallucinations   Thinking distractable   Orientation person: oriented;place: oriented;date: oriented;time: oriented   Memory baseline memory   Insight   (Improving.)   Judgement impaired   Affect full range affect   Mood anxious;depressed   Physical Appearance/Attire attire appropriate to age and situation   Hygiene well groomed   1. Wish to be Dead (Recent) No   Activity restless   Speech coherent;clear

## 2019-12-03 NOTE — PROGRESS NOTES
Re - ongoing discharge/provisional discharge planning and attempts  '    This writer met with patient individually at her request. She asked if there is possibility that she can be on IRTS lists [place] multiple counties. This writer explained to her that she currently is on lists for the majority of IRTS in the [facility] [email or web address] She asked about timelines. This writer spoke with her about the first step is being accepted by a program which will depend largely on her ability to demonstrate safe behaviors on the unit. Discussed with her that in IRTS they will want to know that she can stay safe and follow staff direction even in times when it seems inconsistent between people as things are individualized.   patient asked if behaviors from last night would affect her getting accepted to a program to which I told her it would. That if when she is closer to the top of lists and they ask for updates if they see such behaviors programs will likely decline to move forward. Did however explain to her that the important thing is her coping and behaviors moving forward.    Also discussed with her that she is also on the list to be admitted to the [facility]. patient asked why - explained to her that is because she is committed. She said in the past this has not happened, I explained to her she may not have been aware of being on the list. Discussed with her that the likelihood is timeline wise an IRTS would be available first and the issue is whether she is able to use coping skills and stay safe. Also discussed with her that if a bed did become available at [facility] and no IRTS has yet accepted her that she would then transfer also informed her that if regular IRTS are declining a client referral due to ongoing safety concerns we then look at the state locked IRTS.     In talking discussed with her that we will move forward together as a team and encouraged her to continue to use her coping skills as  discussed with the providers.

## 2019-12-03 NOTE — PROGRESS NOTES
"Patient had another patient's (JS) family member bring her in several  beverages. She states she \"already paid this person\". Writer told patient that money transactions are not allowed on the unit and she can not solicit another patient's family member to bring her things, and doing so was poor boundaries.  Writer told patient that the beverages will need to go back home with the person who brought them. Patient attempted to argue and manipulate writer as well as several other staff several times. Writer ended the discussion and told her she was not receiving one and she could discuss it with the team tomorrow.   Patient has been intrusive with several patient's throughout the evening. Patient told writer as well as other patients that she was 4 months pregnant.   "
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
## 2019-12-04 PROCEDURE — 25000132 ZZH RX MED GY IP 250 OP 250 PS 637: Performed by: PSYCHIATRY & NEUROLOGY

## 2019-12-04 PROCEDURE — 90837 PSYTX W PT 60 MINUTES: CPT

## 2019-12-04 PROCEDURE — 25000132 ZZH RX MED GY IP 250 OP 250 PS 637: Performed by: STUDENT IN AN ORGANIZED HEALTH CARE EDUCATION/TRAINING PROGRAM

## 2019-12-04 PROCEDURE — 99232 SBSQ HOSP IP/OBS MODERATE 35: CPT | Mod: GC | Performed by: PSYCHIATRY & NEUROLOGY

## 2019-12-04 PROCEDURE — 12400001 ZZH R&B MH UMMC

## 2019-12-04 RX ORDER — OLANZAPINE 10 MG/1
10 TABLET, ORALLY DISINTEGRATING ORAL EVERY 6 HOURS PRN
Status: DISPENSED | OUTPATIENT
Start: 2019-12-04 | End: 2019-12-05

## 2019-12-04 RX ORDER — OLANZAPINE 10 MG/1
10 TABLET ORAL
Status: COMPLETED | OUTPATIENT
Start: 2019-12-04 | End: 2019-12-04

## 2019-12-04 RX ORDER — OLANZAPINE 10 MG/1
10 TABLET, ORALLY DISINTEGRATING ORAL ONCE
Status: COMPLETED | OUTPATIENT
Start: 2019-12-04 | End: 2019-12-04

## 2019-12-04 RX ORDER — OLANZAPINE 10 MG/2ML
10 INJECTION, POWDER, FOR SOLUTION INTRAMUSCULAR EVERY 6 HOURS PRN
Status: ACTIVE | OUTPATIENT
Start: 2019-12-04 | End: 2019-12-05

## 2019-12-04 RX ADMIN — BUSPIRONE HYDROCHLORIDE 10 MG: 10 TABLET ORAL at 19:49

## 2019-12-04 RX ADMIN — BUSPIRONE HYDROCHLORIDE 10 MG: 10 TABLET ORAL at 10:01

## 2019-12-04 RX ADMIN — NICOTINE POLACRILEX 6 MG: 4 GUM, CHEWING ORAL at 13:47

## 2019-12-04 RX ADMIN — NICOTINE POLACRILEX 4 MG: 4 GUM, CHEWING ORAL at 10:01

## 2019-12-04 RX ADMIN — NICOTINE POLACRILEX 6 MG: 4 GUM, CHEWING ORAL at 11:01

## 2019-12-04 RX ADMIN — NICOTINE 1 PATCH: 14 PATCH, EXTENDED RELEASE TRANSDERMAL at 10:01

## 2019-12-04 RX ADMIN — FLUPHENAZINE HYDROCHLORIDE 1 MG: 1 TABLET, FILM COATED ORAL at 10:01

## 2019-12-04 RX ADMIN — QUETIAPINE FUMARATE 100 MG: 100 TABLET ORAL at 19:49

## 2019-12-04 RX ADMIN — MIRTAZAPINE 30 MG: 30 TABLET, FILM COATED ORAL at 19:50

## 2019-12-04 RX ADMIN — BUSPIRONE HYDROCHLORIDE 10 MG: 10 TABLET ORAL at 16:00

## 2019-12-04 RX ADMIN — Medication 1 MG: at 16:00

## 2019-12-04 RX ADMIN — GABAPENTIN 800 MG: 400 CAPSULE ORAL at 10:00

## 2019-12-04 RX ADMIN — OLANZAPINE 10 MG: 10 TABLET, ORALLY DISINTEGRATING ORAL at 10:45

## 2019-12-04 RX ADMIN — PRAZOSIN HYDROCHLORIDE 2 MG: 2 CAPSULE ORAL at 19:51

## 2019-12-04 RX ADMIN — Medication 1 MG: at 10:01

## 2019-12-04 RX ADMIN — ACETAMINOPHEN 650 MG: 325 TABLET, FILM COATED ORAL at 10:01

## 2019-12-04 RX ADMIN — ACETAMINOPHEN 650 MG: 325 TABLET, FILM COATED ORAL at 15:59

## 2019-12-04 RX ADMIN — LITHIUM CARBONATE 900 MG: 450 TABLET, EXTENDED RELEASE ORAL at 19:49

## 2019-12-04 RX ADMIN — ACETAMINOPHEN 650 MG: 325 TABLET, FILM COATED ORAL at 19:49

## 2019-12-04 RX ADMIN — GABAPENTIN 800 MG: 400 CAPSULE ORAL at 19:49

## 2019-12-04 RX ADMIN — OLANZAPINE 10 MG: 10 TABLET, ORALLY DISINTEGRATING ORAL at 21:15

## 2019-12-04 RX ADMIN — OLANZAPINE 10 MG: 10 TABLET, FILM COATED ORAL at 17:53

## 2019-12-04 RX ADMIN — LITHIUM CARBONATE 300 MG: 300 TABLET, EXTENDED RELEASE ORAL at 10:01

## 2019-12-04 RX ADMIN — FEXOFENADINE HCL 60 MG: 60 TABLET, FILM COATED ORAL at 10:01

## 2019-12-04 RX ADMIN — GABAPENTIN 800 MG: 400 CAPSULE ORAL at 15:59

## 2019-12-04 RX ADMIN — NICOTINE POLACRILEX 6 MG: 4 GUM, CHEWING ORAL at 16:04

## 2019-12-04 RX ADMIN — NICOTINE POLACRILEX 4 MG: 4 GUM, CHEWING ORAL at 17:42

## 2019-12-04 RX ADMIN — NICOTINE POLACRILEX 6 MG: 4 GUM, CHEWING ORAL at 10:05

## 2019-12-04 RX ADMIN — NICOTINE POLACRILEX 6 MG: 4 GUM, CHEWING ORAL at 20:42
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
## 2019-12-04 NOTE — PROGRESS NOTES
Participated in Music Therapy group with focus on mood elevation, validation and decreasing anxiety and improved group cohesiveness. Distractable, made extra requests, appeared to forget what she was doing in the middle, needed reminders to finish the tasks she had initiated.
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
## 2019-12-04 NOTE — PROGRESS NOTES
"Patient upset about transferring to another unit. Observed swearing in [facility], stating, \"There being fucking assholes.!\" Patient remained unquiet for a good part of the morning. Clearly upset but has remained safe on the unit thus far. Napping this afternoon.          12/04/19 1400   Behavioral Health   Hallucinations denies / not responding to hallucinations   Thinking intact   Orientation person: oriented;place: oriented;date: oriented;time: oriented   Memory baseline memory   Insight poor;denial of illness   Judgement impaired   Affect incongruent   Mood labile;anxious;irritable   Physical Appearance/Attire untidy;disheveled   Hygiene   (Patient showers during the evening.)   1. Wish to be Dead (Recent) No   Activity withdrawn;restless   Speech coherent;clear     "
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
## 2019-12-04 NOTE — PROGRESS NOTES
"Patient sitting in hallways drinking her energy drink. Legs moving rapidly up and down. Patient asked peer \"you need to help me calm down!\" This prior to meeting with team. Beena Villanueva RN    "

## 2019-12-05 PROCEDURE — 90853 GROUP PSYCHOTHERAPY: CPT

## 2019-12-05 PROCEDURE — 25000132 ZZH RX MED GY IP 250 OP 250 PS 637: Performed by: STUDENT IN AN ORGANIZED HEALTH CARE EDUCATION/TRAINING PROGRAM

## 2019-12-05 PROCEDURE — 25000132 ZZH RX MED GY IP 250 OP 250 PS 637: Performed by: PSYCHIATRY & NEUROLOGY

## 2019-12-05 PROCEDURE — 99231 SBSQ HOSP IP/OBS SF/LOW 25: CPT | Mod: GC | Performed by: PSYCHIATRY & NEUROLOGY

## 2019-12-05 PROCEDURE — 12400001 ZZH R&B MH UMMC

## 2019-12-05 RX ORDER — OLANZAPINE 5 MG/1
5 TABLET, ORALLY DISINTEGRATING ORAL 4 TIMES DAILY PRN
Status: DISCONTINUED | OUTPATIENT
Start: 2019-12-05 | End: 2019-12-09

## 2019-12-05 RX ADMIN — FLUPHENAZINE HYDROCHLORIDE 1 MG: 1 TABLET, FILM COATED ORAL at 20:31

## 2019-12-05 RX ADMIN — GABAPENTIN 800 MG: 400 CAPSULE ORAL at 13:42

## 2019-12-05 RX ADMIN — MIRTAZAPINE 30 MG: 30 TABLET, FILM COATED ORAL at 20:31

## 2019-12-05 RX ADMIN — NICOTINE POLACRILEX 4 MG: 4 GUM, CHEWING ORAL at 13:42

## 2019-12-05 RX ADMIN — ACETAMINOPHEN 650 MG: 325 TABLET, FILM COATED ORAL at 20:31

## 2019-12-05 RX ADMIN — BUSPIRONE HYDROCHLORIDE 10 MG: 10 TABLET ORAL at 20:31

## 2019-12-05 RX ADMIN — ACETAMINOPHEN 650 MG: 325 TABLET, FILM COATED ORAL at 16:16

## 2019-12-05 RX ADMIN — GABAPENTIN 800 MG: 400 CAPSULE ORAL at 10:47

## 2019-12-05 RX ADMIN — LITHIUM CARBONATE 900 MG: 450 TABLET, EXTENDED RELEASE ORAL at 20:31

## 2019-12-05 RX ADMIN — LITHIUM CARBONATE 300 MG: 300 TABLET, EXTENDED RELEASE ORAL at 10:48

## 2019-12-05 RX ADMIN — PRAZOSIN HYDROCHLORIDE 2 MG: 2 CAPSULE ORAL at 20:31

## 2019-12-05 RX ADMIN — NICOTINE POLACRILEX 4 MG: 4 GUM, CHEWING ORAL at 10:23

## 2019-12-05 RX ADMIN — BUSPIRONE HYDROCHLORIDE 10 MG: 10 TABLET ORAL at 13:42

## 2019-12-05 RX ADMIN — GABAPENTIN 800 MG: 400 CAPSULE ORAL at 20:31

## 2019-12-05 RX ADMIN — OLANZAPINE 5 MG: 5 TABLET, ORALLY DISINTEGRATING ORAL at 16:47

## 2019-12-05 RX ADMIN — NICOTINE POLACRILEX 6 MG: 4 GUM, CHEWING ORAL at 16:16

## 2019-12-05 RX ADMIN — FEXOFENADINE HCL 60 MG: 60 TABLET, FILM COATED ORAL at 10:48

## 2019-12-05 RX ADMIN — ACETAMINOPHEN 650 MG: 325 TABLET, FILM COATED ORAL at 12:18

## 2019-12-05 RX ADMIN — QUETIAPINE FUMARATE 100 MG: 100 TABLET ORAL at 20:31

## 2019-12-05 RX ADMIN — Medication 1 MG: at 13:42

## 2019-12-05 RX ADMIN — NICOTINE POLACRILEX 6 MG: 4 GUM, CHEWING ORAL at 18:53

## 2019-12-05 RX ADMIN — OLANZAPINE 5 MG: 5 TABLET, ORALLY DISINTEGRATING ORAL at 13:42

## 2019-12-05 RX ADMIN — BUSPIRONE HYDROCHLORIDE 10 MG: 10 TABLET ORAL at 10:47

## 2019-12-05 RX ADMIN — Medication 1 MG: at 10:47

## 2019-12-05 RX ADMIN — FLUPHENAZINE HYDROCHLORIDE 1 MG: 1 TABLET, FILM COATED ORAL at 10:47

## 2019-12-05 RX ADMIN — NICOTINE 1 PATCH: 14 PATCH, EXTENDED RELEASE TRANSDERMAL at 10:51

## 2019-12-05 ASSESSMENT — ACTIVITIES OF DAILY LIVING (ADL)
HYGIENE/GROOMING: INDEPENDENT
ORAL_HYGIENE: INDEPENDENT
DRESS: INDEPENDENT

## 2019-12-05 NOTE — PROGRESS NOTES
Staff attempted multiple times to get patient awake to have breakfast but was not successful. Patient woke up at 1015, food was offered to patient but the patient declined and requested to have her propel flavor water. Patient also refused lunch. Patient appears uneasy, was observed sitting in a chair and shaking her legs. Patient presented blunt affect. Reported  worsen anxiety. Denied SI/SIB.          12/05/19 1213   Behavioral Health   Hallucinations denies / not responding to hallucinations   Thinking intact   Orientation person: oriented;place: oriented;date: oriented;time: oriented   Memory baseline memory   Insight poor   Judgement impaired   Eye Contact at examiner   Affect blunted, flat   Mood anxious   Physical Appearance/Attire appears stated age   Hygiene well groomed   Suicidality other (see comments)  (denied)   1. Wish to be Dead (Recent) No   2. Non-Specific Active Suicidal Thoughts (Recent) No   Self Injury   (denied )   Elopement Statements about wanting to leave  (wanting to switch unit.)   Activity isolative;withdrawn   Speech clear;coherent   Medication Sensitivity no stated side effects   Psychomotor / Gait balanced;steady   Psycho Education   Type of Intervention 1:1 intervention   Response participates with encouragement   Hours 0.5   Safety   Suicidality Status 15   Activities of Daily Living   Hygiene/Grooming independent   Oral Hygiene independent   Dress independent   Room Organization independent   Groups   Details   (did not attend groups)
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
## 2019-12-05 NOTE — PROGRESS NOTES
"Pt was transferred to unit from [facility]. She was upset when she arrived and told writer that \"I should not even be here.\" She began to ask for her \"energy drinks\" from [address] [facility]. She  was informed by staff that the energy drinks were not bought by her and she was not able to have them. ( they were another pt's). She went to her room and punched the wall and started throwing things around her room. She received Zyprexa 10 mg and was able to calm. Pt's right hand is swollen and bruised. She was assessed by MD. Pt iced her hand, and did not have any further complaints the remainder of the evening   "

## 2019-12-05 NOTE — PROGRESS NOTES
"Pt initiated a request for individual psychotherapy after hearing news of a transfer to another unit.  Pt shared feelings of rage directed at providers on her treatment team and one direct meeting was facilitated with therapist present.  Pt requested \"support\" for seeking clarification about the team's decision regarding the transfer, as well as a more direct expression of her anger.  (versus previous indirect threats.)  Pt demonstrated self-regulation about any actual threatening movements, and did not accept an invitation to work them out physically to allow for the release.  Pt just was able to realize that the feelings felt bigger than her body could hold and likely bigger than the current situation, but fueled by past patterns that could be charging her feelings and reactions.    After the supported interaction with her provider, pt stated that didn't help her feel better.  Pt was then directed to put this incident into the context of her life, and why this situation and the people involved might be part of a theme she has started working through.  Pt was offered support for therapeutic work sometimes feeling worse before it feels better and pt's body settled into greater passivity (verbalized by patient \"I just don't want to do any therapy anymore.\") Pt was encouraged to take breaks and take care of herself as needed, but to continue healing- including using the current transfer as another step in that direction.      Pt expressed a desire to return home to her wife, then explored what parts of home were so appealing right now.  She stated: \"feeling understood and accepted\" for who she really is. She currently feels misunderstood.       12/04/19 1230   Dance Movement Therapy   Type of Intervention 1:1 intervention   Response participates, initiates socially appropriate   Hours 1     "

## 2019-12-05 NOTE — PROGRESS NOTES
Pt was feeling angry and unwilling to join group session, but initiated a request for individual psychotherapy after the group session. No charge for group. See individual note.       12/04/19 1130   Dance Movement Therapy   Type of Intervention structured groups   Response refuses

## 2019-12-05 NOTE — PROGRESS NOTES
12/4/19 1400   Art Therapy   Type of Intervention structured groups   Response refused   Hours nc   Treatment Detail    (Art Therapy)- mackenzie   Goal- cope, express, regulate and reflect through Art Therapy directives     Outcome- Pt stopped in group with writer's invitation to attend. She was agitated and unable to join and focus on art. She observed from a standing position by door for a short time, and then walked out. She has been observed by writer physically shaking for the excess energy drinks she is consuming.

## 2019-12-05 NOTE — PROGRESS NOTES
"Brief 1:1 15 minutes    This writer checked in with Ayana after leading a group (Ayana did not attend group).  She presented as sad about being moved [facility]. She reported feeling safe on [facility], was comfortable with the routines, and was familiar with staff and patients. She expressed feelings of loss and doesn't want \"to have to start over again (getting to know new people). When asked what she found helpful in situations such as this, she reported, \"crying in my room, and talking.\"  She stated she appreciated being able to talk with this writer.     Note:  Her knuckles on her right hand are slightly swollen and black and blue. She reported hitting the wall when upset on 22. She stated that she was given an ice pack.  "

## 2019-12-06 PROCEDURE — 25000132 ZZH RX MED GY IP 250 OP 250 PS 637: Performed by: STUDENT IN AN ORGANIZED HEALTH CARE EDUCATION/TRAINING PROGRAM

## 2019-12-06 PROCEDURE — 12400001 ZZH R&B MH UMMC

## 2019-12-06 PROCEDURE — 25000132 ZZH RX MED GY IP 250 OP 250 PS 637: Performed by: PSYCHIATRY & NEUROLOGY

## 2019-12-06 PROCEDURE — 99232 SBSQ HOSP IP/OBS MODERATE 35: CPT | Mod: GC | Performed by: PSYCHIATRY & NEUROLOGY

## 2019-12-06 PROCEDURE — 90837 PSYTX W PT 60 MINUTES: CPT

## 2019-12-06 RX ORDER — BENZOYL PEROXIDE 5 G/100G
GEL TOPICAL
Status: DISCONTINUED | OUTPATIENT
Start: 2019-12-06 | End: 2019-12-17 | Stop reason: HOSPADM

## 2019-12-06 RX ORDER — TIZANIDINE 2 MG/1
2 TABLET ORAL EVERY 6 HOURS PRN
Status: DISCONTINUED | OUTPATIENT
Start: 2019-12-06 | End: 2019-12-09

## 2019-12-06 RX ADMIN — NICOTINE POLACRILEX 4 MG: 4 GUM, CHEWING ORAL at 14:19

## 2019-12-06 RX ADMIN — ACETAMINOPHEN 650 MG: 325 TABLET, FILM COATED ORAL at 19:45

## 2019-12-06 RX ADMIN — NICOTINE POLACRILEX 4 MG: 4 GUM, CHEWING ORAL at 12:06

## 2019-12-06 RX ADMIN — NICOTINE POLACRILEX 4 MG: 4 GUM, CHEWING ORAL at 15:46

## 2019-12-06 RX ADMIN — TIZANIDINE 2 MG: 2 TABLET ORAL at 20:58

## 2019-12-06 RX ADMIN — FLUPHENAZINE HYDROCHLORIDE 1 MG: 1 TABLET, FILM COATED ORAL at 19:46

## 2019-12-06 RX ADMIN — NICOTINE POLACRILEX 4 MG: 4 GUM, CHEWING ORAL at 09:53

## 2019-12-06 RX ADMIN — MIRTAZAPINE 30 MG: 30 TABLET, FILM COATED ORAL at 20:13

## 2019-12-06 RX ADMIN — OLANZAPINE 5 MG: 5 TABLET, ORALLY DISINTEGRATING ORAL at 09:54

## 2019-12-06 RX ADMIN — QUETIAPINE FUMARATE 100 MG: 100 TABLET ORAL at 19:45

## 2019-12-06 RX ADMIN — FEXOFENADINE HCL 60 MG: 60 TABLET, FILM COATED ORAL at 09:54

## 2019-12-06 RX ADMIN — LITHIUM CARBONATE 900 MG: 450 TABLET, EXTENDED RELEASE ORAL at 19:45

## 2019-12-06 RX ADMIN — Medication 1 MG: at 14:19

## 2019-12-06 RX ADMIN — GABAPENTIN 800 MG: 400 CAPSULE ORAL at 09:54

## 2019-12-06 RX ADMIN — ACETAMINOPHEN 650 MG: 325 TABLET, FILM COATED ORAL at 16:53

## 2019-12-06 RX ADMIN — Medication 1 MG: at 09:53

## 2019-12-06 RX ADMIN — BUSPIRONE HYDROCHLORIDE 10 MG: 10 TABLET ORAL at 14:19

## 2019-12-06 RX ADMIN — BUSPIRONE HYDROCHLORIDE 10 MG: 10 TABLET ORAL at 19:45

## 2019-12-06 RX ADMIN — GABAPENTIN 800 MG: 400 CAPSULE ORAL at 14:19

## 2019-12-06 RX ADMIN — BUSPIRONE HYDROCHLORIDE 10 MG: 10 TABLET ORAL at 09:53

## 2019-12-06 RX ADMIN — GABAPENTIN 800 MG: 400 CAPSULE ORAL at 19:45

## 2019-12-06 RX ADMIN — BENZOYL PEROXIDE: 5 GEL TOPICAL at 20:13

## 2019-12-06 RX ADMIN — ACETAMINOPHEN 650 MG: 325 TABLET, FILM COATED ORAL at 12:06

## 2019-12-06 RX ADMIN — TIZANIDINE 2 MG: 2 TABLET ORAL at 12:25

## 2019-12-06 RX ADMIN — NICOTINE 1 PATCH: 14 PATCH, EXTENDED RELEASE TRANSDERMAL at 09:53

## 2019-12-06 RX ADMIN — NICOTINE POLACRILEX 4 MG: 4 GUM, CHEWING ORAL at 18:30

## 2019-12-06 RX ADMIN — PRAZOSIN HYDROCHLORIDE 2 MG: 2 CAPSULE ORAL at 20:13

## 2019-12-06 RX ADMIN — FLUPHENAZINE HYDROCHLORIDE 1 MG: 1 TABLET, FILM COATED ORAL at 09:54

## 2019-12-06 RX ADMIN — NICOTINE POLACRILEX 4 MG: 4 GUM, CHEWING ORAL at 11:09

## 2019-12-06 RX ADMIN — LITHIUM CARBONATE 300 MG: 300 TABLET, EXTENDED RELEASE ORAL at 09:54

## 2019-12-06 RX ADMIN — OLANZAPINE 5 MG: 5 TABLET, ORALLY DISINTEGRATING ORAL at 15:46

## 2019-12-06 RX ADMIN — NICOTINE POLACRILEX 6 MG: 4 GUM, CHEWING ORAL at 19:44

## 2019-12-06 ASSESSMENT — ACTIVITIES OF DAILY LIVING (ADL)
DRESS: STREET CLOTHES
LAUNDRY: WITH SUPERVISION
ORAL_HYGIENE: INDEPENDENT
HYGIENE/GROOMING: INDEPENDENT

## 2019-12-06 NOTE — PROGRESS NOTES
Pt welcomed an individual psychotherapy session expressing feeling sad and lonely.  She explained why she initially refused to speak with her treatment team (disappoinement & loss) but then chose to meet with them (a commitment to her longer term goal of discharging with a solid plan for treatment.  Pt explored options to remain self-regulated and practiced a few of her favorites.  She remained mostly on therapeutic topics during the session, with focus and honesty.         12/06/19 1500   Dance Movement Therapy   Type of Intervention 1:1 intervention   Response participates, initiates socially appropriate   Hours 1

## 2019-12-06 NOTE — PROGRESS NOTES
"Pt spent the majority of the shift on the fringes of the milieu. She told staff she continues to feel depressed but feels \"better\". She denied SI. She also endorsed feelings of anxiety and requested Zyprexa which she said was effective with her anxiety. She attended OT group for a short period. She had a visit with her wife that staff overheard was not going well.   "
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
## 2019-12-06 NOTE — PROGRESS NOTES
12/05/19 2000   Group Therapy Session   Group Attendance attended group session   Total Time (minutes) 25   Group Type psychotherapeutic   Group Topic Covered other (see comments)   Patient Participation/Contribution cooperative with task;did not share thoughts verbally   Group topic: Communication  Ayana did not speak when others were present. She was the first to arrive in group and asked if this writer needed help moving the table. Once others were in the room, she did not speak. She presented as despondent. She participated in the written part of the activity but did not participate in the verbal discussion about good communication vs ineffective communication. She left the group without explanation after about 25 minutes.
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
## 2019-12-07 PROCEDURE — 25000132 ZZH RX MED GY IP 250 OP 250 PS 637: Performed by: STUDENT IN AN ORGANIZED HEALTH CARE EDUCATION/TRAINING PROGRAM

## 2019-12-07 PROCEDURE — 25000132 ZZH RX MED GY IP 250 OP 250 PS 637: Performed by: PSYCHIATRY & NEUROLOGY

## 2019-12-07 PROCEDURE — 12400001 ZZH R&B MH UMMC

## 2019-12-07 RX ADMIN — FEXOFENADINE HCL 60 MG: 60 TABLET, FILM COATED ORAL at 10:10

## 2019-12-07 RX ADMIN — Medication 1 MG: at 10:10

## 2019-12-07 RX ADMIN — ACETAMINOPHEN 650 MG: 325 TABLET, FILM COATED ORAL at 12:00

## 2019-12-07 RX ADMIN — BENZOYL PEROXIDE: 5 GEL TOPICAL at 19:37

## 2019-12-07 RX ADMIN — MIRTAZAPINE 30 MG: 30 TABLET, FILM COATED ORAL at 19:36

## 2019-12-07 RX ADMIN — OLANZAPINE 5 MG: 5 TABLET, ORALLY DISINTEGRATING ORAL at 12:32

## 2019-12-07 RX ADMIN — GABAPENTIN 800 MG: 400 CAPSULE ORAL at 19:36

## 2019-12-07 RX ADMIN — FLUPHENAZINE HYDROCHLORIDE 1 MG: 1 TABLET, FILM COATED ORAL at 10:10

## 2019-12-07 RX ADMIN — NICOTINE POLACRILEX 4 MG: 4 GUM, CHEWING ORAL at 16:08

## 2019-12-07 RX ADMIN — BUSPIRONE HYDROCHLORIDE 10 MG: 10 TABLET ORAL at 10:10

## 2019-12-07 RX ADMIN — NICOTINE 1 PATCH: 14 PATCH, EXTENDED RELEASE TRANSDERMAL at 10:14

## 2019-12-07 RX ADMIN — ACETAMINOPHEN 650 MG: 325 TABLET, FILM COATED ORAL at 19:36

## 2019-12-07 RX ADMIN — TIZANIDINE 2 MG: 2 TABLET ORAL at 12:32

## 2019-12-07 RX ADMIN — NICOTINE POLACRILEX 4 MG: 4 GUM, CHEWING ORAL at 18:31

## 2019-12-07 RX ADMIN — OLANZAPINE 5 MG: 5 TABLET, ORALLY DISINTEGRATING ORAL at 16:42

## 2019-12-07 RX ADMIN — LITHIUM CARBONATE 300 MG: 300 TABLET, EXTENDED RELEASE ORAL at 10:10

## 2019-12-07 RX ADMIN — GABAPENTIN 800 MG: 400 CAPSULE ORAL at 10:10

## 2019-12-07 RX ADMIN — QUETIAPINE FUMARATE 100 MG: 100 TABLET ORAL at 19:35

## 2019-12-07 RX ADMIN — NICOTINE POLACRILEX 4 MG: 4 GUM, CHEWING ORAL at 12:00

## 2019-12-07 RX ADMIN — FLUPHENAZINE HYDROCHLORIDE 1 MG: 1 TABLET, FILM COATED ORAL at 19:36

## 2019-12-07 RX ADMIN — LITHIUM CARBONATE 900 MG: 450 TABLET, EXTENDED RELEASE ORAL at 19:36

## 2019-12-07 RX ADMIN — NICOTINE POLACRILEX 6 MG: 4 GUM, CHEWING ORAL at 19:37

## 2019-12-07 RX ADMIN — ACETAMINOPHEN 650 MG: 325 TABLET, FILM COATED ORAL at 16:10

## 2019-12-07 RX ADMIN — Medication 1 MG: at 15:05

## 2019-12-07 RX ADMIN — GABAPENTIN 800 MG: 400 CAPSULE ORAL at 15:05

## 2019-12-07 RX ADMIN — TIZANIDINE 2 MG: 2 TABLET ORAL at 18:31

## 2019-12-07 RX ADMIN — BUSPIRONE HYDROCHLORIDE 10 MG: 10 TABLET ORAL at 19:35

## 2019-12-07 RX ADMIN — TIZANIDINE 2 MG: 2 TABLET ORAL at 10:12

## 2019-12-07 RX ADMIN — BUSPIRONE HYDROCHLORIDE 10 MG: 10 TABLET ORAL at 15:05

## 2019-12-07 RX ADMIN — NICOTINE POLACRILEX 4 MG: 4 GUM, CHEWING ORAL at 10:10

## 2019-12-07 RX ADMIN — PRAZOSIN HYDROCHLORIDE 2 MG: 2 CAPSULE ORAL at 19:36

## 2019-12-07 ASSESSMENT — ACTIVITIES OF DAILY LIVING (ADL)
DRESS: INDEPENDENT
HYGIENE/GROOMING: INDEPENDENT
DRESS: INDEPENDENT
HYGIENE/GROOMING: INDEPENDENT
ORAL_HYGIENE: INDEPENDENT
ORAL_HYGIENE: INDEPENDENT
LAUNDRY: UNABLE TO COMPLETE

## 2019-12-07 NOTE — PROGRESS NOTES
Pt denies SI/SIB. Pt. Reports depression at a 5 and anxiety at a 10 - 10 being the high. Pt watched movie with peers but sat in the song area. Pt affect was flat - pt was calm.  Pt reported anxiety as high and reports watching movie os form of distraction to lower anxiety.     12/07/19 1129   Behavioral Health   Hallucinations denies / not responding to hallucinations   Thinking intact   Orientation person: oriented;place: oriented   Memory baseline memory   Insight insight appropriate to situation   Judgement impaired   Eye Contact at examiner   Affect blunted, flat   Mood anxious;mood is calm   Suicidality other (see comments)  (pt denies)   Self Injury other (see comment)  (pt denies)   Activity other (see comment)   Activities of Daily Living   Hygiene/Grooming independent   Oral Hygiene independent   Dress independent   Room Organization independent

## 2019-12-07 NOTE — PROGRESS NOTES
Patient reports feeling sad and anxious due to moving units, with passive SI thoughts only. Patient states she does feel safe on the unit and is waiting for an IRTS to open up. Patient was visible in the milieu but withdrawn with flat affect, and asked for PRNs for anxiety.     12/06/19 2000   Behavioral Health   Hallucinations denies / not responding to hallucinations   Thinking intact   Orientation person: oriented;place: oriented;date: oriented;time: oriented   Memory confabulation   Insight admits / accepts   Judgement impaired   Eye Contact at examiner   Affect blunted, flat   Mood depressed;anxious   Physical Appearance/Attire appears stated age;attire appropriate to age and situation   Hygiene well groomed   Suicidality thoughts only   1. Wish to be Dead (Recent) No   2. Non-Specific Active Suicidal Thoughts (Recent) No   Self Injury other (see comment)  (Denies)   Activity withdrawn   Speech clear;coherent   Medication Sensitivity no stated side effects;no observed side effects   Psychomotor / Gait balanced;steady   Activities of Daily Living   Hygiene/Grooming independent   Oral Hygiene independent   Dress street clothes   Laundry with supervision   Room Organization independent

## 2019-12-08 PROCEDURE — 25000132 ZZH RX MED GY IP 250 OP 250 PS 637: Performed by: STUDENT IN AN ORGANIZED HEALTH CARE EDUCATION/TRAINING PROGRAM

## 2019-12-08 PROCEDURE — H2032 ACTIVITY THERAPY, PER 15 MIN: HCPCS

## 2019-12-08 PROCEDURE — 25000132 ZZH RX MED GY IP 250 OP 250 PS 637: Performed by: PSYCHIATRY & NEUROLOGY

## 2019-12-08 PROCEDURE — 12400001 ZZH R&B MH UMMC

## 2019-12-08 RX ADMIN — GABAPENTIN 800 MG: 400 CAPSULE ORAL at 20:07

## 2019-12-08 RX ADMIN — NICOTINE POLACRILEX 4 MG: 4 GUM, CHEWING ORAL at 13:20

## 2019-12-08 RX ADMIN — NICOTINE POLACRILEX 6 MG: 4 GUM, CHEWING ORAL at 20:10

## 2019-12-08 RX ADMIN — OLANZAPINE 5 MG: 5 TABLET, ORALLY DISINTEGRATING ORAL at 12:13

## 2019-12-08 RX ADMIN — ACETAMINOPHEN 650 MG: 325 TABLET, FILM COATED ORAL at 20:07

## 2019-12-08 RX ADMIN — Medication 1 MG: at 11:22

## 2019-12-08 RX ADMIN — BENZOYL PEROXIDE: 5 GEL TOPICAL at 21:14

## 2019-12-08 RX ADMIN — NICOTINE POLACRILEX 4 MG: 4 GUM, CHEWING ORAL at 21:14

## 2019-12-08 RX ADMIN — NICOTINE POLACRILEX 4 MG: 4 GUM, CHEWING ORAL at 18:39

## 2019-12-08 RX ADMIN — FLUPHENAZINE HYDROCHLORIDE 1 MG: 1 TABLET, FILM COATED ORAL at 11:22

## 2019-12-08 RX ADMIN — PRAZOSIN HYDROCHLORIDE 2 MG: 2 CAPSULE ORAL at 20:07

## 2019-12-08 RX ADMIN — NICOTINE POLACRILEX 4 MG: 4 GUM, CHEWING ORAL at 16:15

## 2019-12-08 RX ADMIN — BUSPIRONE HYDROCHLORIDE 10 MG: 10 TABLET ORAL at 20:07

## 2019-12-08 RX ADMIN — LITHIUM CARBONATE 300 MG: 300 TABLET, EXTENDED RELEASE ORAL at 11:20

## 2019-12-08 RX ADMIN — TIZANIDINE 2 MG: 2 TABLET ORAL at 18:39

## 2019-12-08 RX ADMIN — GABAPENTIN 800 MG: 400 CAPSULE ORAL at 11:21

## 2019-12-08 RX ADMIN — NICOTINE POLACRILEX 4 MG: 4 GUM, CHEWING ORAL at 11:27

## 2019-12-08 RX ADMIN — ACETAMINOPHEN 650 MG: 325 TABLET, FILM COATED ORAL at 11:21

## 2019-12-08 RX ADMIN — OLANZAPINE 5 MG: 5 TABLET, ORALLY DISINTEGRATING ORAL at 20:35

## 2019-12-08 RX ADMIN — FEXOFENADINE HCL 60 MG: 60 TABLET, FILM COATED ORAL at 11:21

## 2019-12-08 RX ADMIN — ACETAMINOPHEN 650 MG: 325 TABLET, FILM COATED ORAL at 16:15

## 2019-12-08 RX ADMIN — MIRTAZAPINE 30 MG: 30 TABLET, FILM COATED ORAL at 20:07

## 2019-12-08 RX ADMIN — TIZANIDINE 2 MG: 2 TABLET ORAL at 12:13

## 2019-12-08 RX ADMIN — BUSPIRONE HYDROCHLORIDE 10 MG: 10 TABLET ORAL at 11:21

## 2019-12-08 RX ADMIN — NICOTINE 1 PATCH: 14 PATCH, EXTENDED RELEASE TRANSDERMAL at 11:22

## 2019-12-08 RX ADMIN — QUETIAPINE FUMARATE 100 MG: 100 TABLET ORAL at 20:07

## 2019-12-08 RX ADMIN — LITHIUM CARBONATE 900 MG: 450 TABLET, EXTENDED RELEASE ORAL at 20:07

## 2019-12-08 RX ADMIN — FLUPHENAZINE HYDROCHLORIDE 1 MG: 1 TABLET, FILM COATED ORAL at 20:07

## 2019-12-08 ASSESSMENT — ACTIVITIES OF DAILY LIVING (ADL)
ORAL_HYGIENE: INDEPENDENT
DRESS: INDEPENDENT
HYGIENE/GROOMING: INDEPENDENT

## 2019-12-08 NOTE — PLAN OF CARE
Pt slept until nearly 1100 today.  When awake pt is pleasant but isolative, despite being in the milieu.  Patient is pleasant with staff and is fully compliant with medications.  Pt asked this writer about her chances of being returned to [facility].  The patient again told this writer that she misses the staff and her friends on [facility].  Pt was advised that this writer has no input or decision making power regarding a change of unit.

## 2019-12-08 NOTE — PROGRESS NOTES
12/07/19 2200   Behavioral Health   Hallucinations denies / not responding to hallucinations   Thinking intact   Orientation person: oriented;date: oriented;place: oriented;time: oriented   Memory baseline memory   Insight insight appropriate to situation;insight appropriate to events   Judgement impaired   Eye Contact at examiner   Affect blunted, flat   Mood anxious;mood is calm   Suicidality other (see comments)  (denies)   1. Wish to be Dead (Recent) No   2. Non-Specific Active Suicidal Thoughts (Recent) No   Activities of Daily Living   Hygiene/Grooming independent   Oral Hygiene independent   Dress independent   Laundry unable to complete   Room Organization independent     Patient had a good shift. She was visible in the milieu and attended groups. She was somewhat isolative but ate meals and had pleasant conversation with peers. She reported feeling anxious/depressed. She used the coping skill of coloring to help with anxious thoughts.

## 2019-12-09 PROCEDURE — 25000132 ZZH RX MED GY IP 250 OP 250 PS 637: Performed by: STUDENT IN AN ORGANIZED HEALTH CARE EDUCATION/TRAINING PROGRAM

## 2019-12-09 PROCEDURE — 25000132 ZZH RX MED GY IP 250 OP 250 PS 637: Performed by: PSYCHIATRY & NEUROLOGY

## 2019-12-09 PROCEDURE — 90853 GROUP PSYCHOTHERAPY: CPT

## 2019-12-09 PROCEDURE — 12400001 ZZH R&B MH UMMC

## 2019-12-09 PROCEDURE — 99232 SBSQ HOSP IP/OBS MODERATE 35: CPT | Mod: GC | Performed by: PSYCHIATRY & NEUROLOGY

## 2019-12-09 RX ORDER — QUETIAPINE FUMARATE 50 MG/1
50 TABLET, FILM COATED ORAL 2 TIMES DAILY PRN
Status: DISCONTINUED | OUTPATIENT
Start: 2019-12-09 | End: 2019-12-17 | Stop reason: HOSPADM

## 2019-12-09 RX ADMIN — LITHIUM CARBONATE 300 MG: 300 TABLET, EXTENDED RELEASE ORAL at 11:32

## 2019-12-09 RX ADMIN — NICOTINE 1 PATCH: 14 PATCH, EXTENDED RELEASE TRANSDERMAL at 11:31

## 2019-12-09 RX ADMIN — TIZANIDINE 4 MG: 4 TABLET ORAL at 13:08

## 2019-12-09 RX ADMIN — BUSPIRONE HYDROCHLORIDE 10 MG: 10 TABLET ORAL at 14:02

## 2019-12-09 RX ADMIN — QUETIAPINE FUMARATE 100 MG: 100 TABLET ORAL at 21:01

## 2019-12-09 RX ADMIN — TIZANIDINE 4 MG: 4 TABLET ORAL at 19:34

## 2019-12-09 RX ADMIN — LITHIUM CARBONATE 900 MG: 450 TABLET, EXTENDED RELEASE ORAL at 21:01

## 2019-12-09 RX ADMIN — GABAPENTIN 800 MG: 400 CAPSULE ORAL at 14:01

## 2019-12-09 RX ADMIN — FLUPHENAZINE HYDROCHLORIDE 1 MG: 1 TABLET, FILM COATED ORAL at 21:01

## 2019-12-09 RX ADMIN — Medication 1 MG: at 14:02

## 2019-12-09 RX ADMIN — NICOTINE POLACRILEX 4 MG: 4 GUM, CHEWING ORAL at 13:08

## 2019-12-09 RX ADMIN — NICOTINE POLACRILEX 6 MG: 4 GUM, CHEWING ORAL at 21:01

## 2019-12-09 RX ADMIN — NICOTINE POLACRILEX 4 MG: 4 GUM, CHEWING ORAL at 11:32

## 2019-12-09 RX ADMIN — ACETAMINOPHEN 650 MG: 325 TABLET, FILM COATED ORAL at 11:31

## 2019-12-09 RX ADMIN — GABAPENTIN 800 MG: 400 CAPSULE ORAL at 11:33

## 2019-12-09 RX ADMIN — MIRTAZAPINE 30 MG: 30 TABLET, FILM COATED ORAL at 21:01

## 2019-12-09 RX ADMIN — FLUPHENAZINE HYDROCHLORIDE 1 MG: 1 TABLET, FILM COATED ORAL at 11:32

## 2019-12-09 RX ADMIN — FEXOFENADINE HCL 60 MG: 60 TABLET, FILM COATED ORAL at 11:33

## 2019-12-09 RX ADMIN — NICOTINE POLACRILEX 6 MG: 4 GUM, CHEWING ORAL at 18:22

## 2019-12-09 RX ADMIN — Medication 1 MG: at 11:32

## 2019-12-09 RX ADMIN — NICOTINE POLACRILEX 4 MG: 4 GUM, CHEWING ORAL at 14:02

## 2019-12-09 RX ADMIN — ACETAMINOPHEN 650 MG: 325 TABLET, FILM COATED ORAL at 21:01

## 2019-12-09 RX ADMIN — BUSPIRONE HYDROCHLORIDE 10 MG: 10 TABLET ORAL at 21:01

## 2019-12-09 RX ADMIN — BUSPIRONE HYDROCHLORIDE 10 MG: 10 TABLET ORAL at 11:32

## 2019-12-09 RX ADMIN — GABAPENTIN 800 MG: 400 CAPSULE ORAL at 21:01

## 2019-12-09 RX ADMIN — PRAZOSIN HYDROCHLORIDE 2 MG: 2 CAPSULE ORAL at 21:01

## 2019-12-09 ASSESSMENT — ACTIVITIES OF DAILY LIVING (ADL)
DRESS: INDEPENDENT
HYGIENE/GROOMING: INDEPENDENT
HYGIENE/GROOMING: INDEPENDENT
DRESS: INDEPENDENT
ORAL_HYGIENE: INDEPENDENT
ORAL_HYGIENE: INDEPENDENT

## 2019-12-09 NOTE — PROGRESS NOTES
Patient is scheduled for 2 IRTS interviews  [facility] - 12/12/19 at 1:30pm  [facility] 12/18/19 at 11:30am  Msgs have been left at several others- awaiting calls back.  Patient and team informed.

## 2019-12-09 NOTE — PROGRESS NOTES
12/09/19 1141   Behavioral Health   Hallucinations denies / not responding to hallucinations   Thinking poor concentration   Orientation other (see comment)  (not able to assess)   Memory   (not able to assess)   Insight poor   Judgement impaired   Eye Contact   (under her bed covers)   Affect blunted, flat;tense   Mood irritable   Physical Appearance/Attire untidy   Hygiene body odor   Suicidality other (see comments)  (not able to assess)   1. Wish to be Dead (Recent)   (not able to assess)   2. Non-Specific Active Suicidal Thoughts (Recent)   (not able to assess)   Self Injury other (see comment)  (not able to assess)   Elopement   (nothing to report)   Activity isolative;withdrawn   Speech clear;coherent   Medication Sensitivity no observed side effects   Psychomotor / Gait balanced;steady   Psycho Education   Type of Intervention 1:1 intervention   Response refuses   Activities of Daily Living   Hygiene/Grooming independent   Oral Hygiene independent   Dress independent   Room Organization independent   The patient was in her room most of the shift and was refusing to interact with staff.  The patient refused breakfast or to have her vital signs taken.  The patient refused a check in with staff.  The patient was irritable with staff during these interactions.  The patient did come down for a meeting with her doctor, but immediately returned to her room and slept.  The patient is starting to neglect her ADL's and will need to be prompted for shower and clean her room when she is willing to interact with staff.  This was passed on to the patient nurse.
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
## 2019-12-09 NOTE — PROGRESS NOTES
12/08/19 3903   Behavioral Health   Hallucinations denies / not responding to hallucinations   Thinking intact   Orientation person: oriented;place: oriented;date: oriented;time: oriented   Memory baseline memory   Insight other (see comment)  (DIANNA)   Judgement intact   Eye Contact at examiner   Affect full range affect   Mood mood is calm   Physical Appearance/Attire attire appropriate to age and situation   Hygiene well groomed   Suicidality other (see comments)  (None stated or observed)   1. Wish to be Dead (Recent) No   2. Non-Specific Active Suicidal Thoughts (Recent) No   Self Injury other (see comment)  (None stated or observed)   Activity other (see comment)  (Pt somewhat active in the milieu)   Speech clear;coherent   Medication Sensitivity no stated side effects;no observed side effects   Psychomotor / Gait balanced;steady   Safety   Suicidality Status 15   Activities of Daily Living   Hygiene/Grooming independent   Oral Hygiene independent   Dress independent   Room Organization independent     Pt was out in the milieu on and off throughout the night.  Pt did attend the music therapy group tonight.  Pt seemed to have a blunted affect when not in Music Group.  Pt did not want to check in with staff tonight.  No SI/SIB was stated or observed.

## 2019-12-09 NOTE — PLAN OF CARE
48 hours reassessment:  Pt's had depressed mood and sad affect. She told this writer that she  wish I am not here  (in this world). She stated that she had no suicidal plan or intent. She stated she had no self-harm thoughts. She had no auditory or visual hallucination. Pt participated in music therapy group. She told this writer that her main stressor was her relationship issue. 1:1 session x 15 minutes. Staff will continue to monitor pt per care plan.

## 2019-12-10 PROCEDURE — 99231 SBSQ HOSP IP/OBS SF/LOW 25: CPT | Mod: GC | Performed by: PSYCHIATRY & NEUROLOGY

## 2019-12-10 PROCEDURE — 90837 PSYTX W PT 60 MINUTES: CPT

## 2019-12-10 PROCEDURE — 25000132 ZZH RX MED GY IP 250 OP 250 PS 637: Performed by: STUDENT IN AN ORGANIZED HEALTH CARE EDUCATION/TRAINING PROGRAM

## 2019-12-10 PROCEDURE — 25000132 ZZH RX MED GY IP 250 OP 250 PS 637: Performed by: PSYCHIATRY & NEUROLOGY

## 2019-12-10 PROCEDURE — G0177 OPPS/PHP; TRAIN & EDUC SERV: HCPCS

## 2019-12-10 PROCEDURE — 12400001 ZZH R&B MH UMMC

## 2019-12-10 RX ADMIN — NICOTINE POLACRILEX 6 MG: 4 GUM, CHEWING ORAL at 13:34

## 2019-12-10 RX ADMIN — NICOTINE POLACRILEX 6 MG: 4 GUM, CHEWING ORAL at 10:27

## 2019-12-10 RX ADMIN — NICOTINE POLACRILEX 6 MG: 4 GUM, CHEWING ORAL at 20:29

## 2019-12-10 RX ADMIN — Medication 1 MG: at 13:33

## 2019-12-10 RX ADMIN — Medication 1 MG: at 10:28

## 2019-12-10 RX ADMIN — BENZOYL PEROXIDE: 5 GEL TOPICAL at 21:01

## 2019-12-10 RX ADMIN — LITHIUM CARBONATE 300 MG: 300 TABLET, EXTENDED RELEASE ORAL at 10:28

## 2019-12-10 RX ADMIN — ACETAMINOPHEN 650 MG: 325 TABLET, FILM COATED ORAL at 10:28

## 2019-12-10 RX ADMIN — MIRTAZAPINE 30 MG: 30 TABLET, FILM COATED ORAL at 20:29

## 2019-12-10 RX ADMIN — FLUPHENAZINE HYDROCHLORIDE 1 MG: 1 TABLET, FILM COATED ORAL at 20:29

## 2019-12-10 RX ADMIN — GABAPENTIN 800 MG: 400 CAPSULE ORAL at 20:28

## 2019-12-10 RX ADMIN — FEXOFENADINE HCL 60 MG: 60 TABLET, FILM COATED ORAL at 10:28

## 2019-12-10 RX ADMIN — BUSPIRONE HYDROCHLORIDE 10 MG: 10 TABLET ORAL at 10:28

## 2019-12-10 RX ADMIN — PRAZOSIN HYDROCHLORIDE 2 MG: 2 CAPSULE ORAL at 20:29

## 2019-12-10 RX ADMIN — NICOTINE POLACRILEX 6 MG: 4 GUM, CHEWING ORAL at 17:15

## 2019-12-10 RX ADMIN — FLUPHENAZINE HYDROCHLORIDE 1 MG: 1 TABLET, FILM COATED ORAL at 10:28

## 2019-12-10 RX ADMIN — ACETAMINOPHEN 650 MG: 325 TABLET, FILM COATED ORAL at 17:15

## 2019-12-10 RX ADMIN — BUSPIRONE HYDROCHLORIDE 10 MG: 10 TABLET ORAL at 20:29

## 2019-12-10 RX ADMIN — ACETAMINOPHEN 650 MG: 325 TABLET, FILM COATED ORAL at 20:29

## 2019-12-10 RX ADMIN — LITHIUM CARBONATE 900 MG: 450 TABLET, EXTENDED RELEASE ORAL at 20:28

## 2019-12-10 RX ADMIN — BUSPIRONE HYDROCHLORIDE 10 MG: 10 TABLET ORAL at 13:33

## 2019-12-10 RX ADMIN — NICOTINE POLACRILEX 6 MG: 4 GUM, CHEWING ORAL at 18:53

## 2019-12-10 RX ADMIN — NICOTINE 1 PATCH: 14 PATCH, EXTENDED RELEASE TRANSDERMAL at 10:29

## 2019-12-10 RX ADMIN — QUETIAPINE FUMARATE 100 MG: 100 TABLET ORAL at 20:29

## 2019-12-10 RX ADMIN — GABAPENTIN 800 MG: 400 CAPSULE ORAL at 13:33

## 2019-12-10 RX ADMIN — TIZANIDINE 4 MG: 4 TABLET ORAL at 17:15

## 2019-12-10 RX ADMIN — GABAPENTIN 800 MG: 400 CAPSULE ORAL at 10:29

## 2019-12-10 RX ADMIN — TIZANIDINE 4 MG: 4 TABLET ORAL at 10:32

## 2019-12-10 ASSESSMENT — ACTIVITIES OF DAILY LIVING (ADL)
DRESS: INDEPENDENT
ORAL_HYGIENE: INDEPENDENT
HYGIENE/GROOMING: INDEPENDENT

## 2019-12-10 NOTE — PROGRESS NOTES
12/09/19 2200   Significant Event   Significant Event Other (see comments)  (denies)     Pt was visible in the milieu, withdrawn, quiet, participated in evening group, ate dinner, watched TV, listened to music.  Calm, cooperative, no stated SI, SIB or hallucinations, depressed and anxious..

## 2019-12-10 NOTE — PLAN OF CARE
Problem: OT General Care Plan  Goal: OT Goal 1  Description  Pt will practice using >2 coping strategies to manage stress and reduce symptoms to demonstrate increased readiness for discharge.     Attended 2/2 occupational therapy groups offered this date. Overall content and cooperative.   [facility]. Pt Response: I to initiate, gather materials, sequence and adjust to workspace demands as needed, demonstrating fair focus, planning, and problem solving. Able to ask for assistance as needed and appeared comfortable in the presence of peers and staff.   Attended afternoon group focused on discussion and utilization of social emotional supports. Education provided on various types of supportive relationships, specifically emotional support. Pt Response: Demonstrated fair insight as reported use of her own dog for emotional support and importance of utilizing supports during decreased mood. Attentive during dog therapist visit with brightened affect.     Outcome: Improving

## 2019-12-10 NOTE — PROGRESS NOTES
"   12/09/19 2000   Group Therapy Session   Group Attendance attended group session   Total Time (minutes) 2000   Group Type psychotherapeutic   Group Topic Covered other (see comments)   Patient Participation/Contribution cooperative with task;discussed personal experience with topic;listened actively   Patient participated in psychotherapy group which focused on personal resiliency by identifying individual strengths and positive coping skills.  Ayana was in a bright mood. She reported feeling \"okay\" because she may be able to move back to [facility] and/or may be discharging next week.  She engaged in the activity, identified some of her strengths/coping strategies, and processed with the group. This writer brought PlayDoh to use like a stress ball or a fidget which appears to help Ayana focus during the group.  "

## 2019-12-10 NOTE — PLAN OF CARE
Pt is very hopeful about an interview she has later this week with an IRTs.  She seems excited about the prospect.  She has been less worried about being transferred back to [facility]. This writer believes that she has been finally building relationships with the staff and other patients to the point that she is no longer focused on the transfer.  Patient continues to complain about back pain, but it is treated satisfactorily with PRN muscle relaxers.
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
## 2019-12-11 PROCEDURE — H2032 ACTIVITY THERAPY, PER 15 MIN: HCPCS

## 2019-12-11 PROCEDURE — 25000132 ZZH RX MED GY IP 250 OP 250 PS 637: Performed by: STUDENT IN AN ORGANIZED HEALTH CARE EDUCATION/TRAINING PROGRAM

## 2019-12-11 PROCEDURE — 25000132 ZZH RX MED GY IP 250 OP 250 PS 637: Performed by: PSYCHIATRY & NEUROLOGY

## 2019-12-11 PROCEDURE — 12400001 ZZH R&B MH UMMC

## 2019-12-11 PROCEDURE — 99231 SBSQ HOSP IP/OBS SF/LOW 25: CPT | Mod: GC | Performed by: PSYCHIATRY & NEUROLOGY

## 2019-12-11 PROCEDURE — 25000128 H RX IP 250 OP 636: Performed by: STUDENT IN AN ORGANIZED HEALTH CARE EDUCATION/TRAINING PROGRAM

## 2019-12-11 PROCEDURE — G0177 OPPS/PHP; TRAIN & EDUC SERV: HCPCS

## 2019-12-11 RX ADMIN — NICOTINE POLACRILEX 4 MG: 4 GUM, CHEWING ORAL at 09:35

## 2019-12-11 RX ADMIN — ACETAMINOPHEN 650 MG: 325 TABLET, FILM COATED ORAL at 09:35

## 2019-12-11 RX ADMIN — NICOTINE POLACRILEX 6 MG: 4 GUM, CHEWING ORAL at 19:10

## 2019-12-11 RX ADMIN — BUSPIRONE HYDROCHLORIDE 10 MG: 10 TABLET ORAL at 21:17

## 2019-12-11 RX ADMIN — FLUPHENAZINE DECANOATE 6.25 MG: 25 INJECTION, SOLUTION INTRAMUSCULAR; SUBCUTANEOUS at 15:26

## 2019-12-11 RX ADMIN — FLUPHENAZINE HYDROCHLORIDE 1 MG: 1 TABLET, FILM COATED ORAL at 21:17

## 2019-12-11 RX ADMIN — ONDANSETRON HYDROCHLORIDE 4 MG: 4 TABLET, FILM COATED ORAL at 10:58

## 2019-12-11 RX ADMIN — BUSPIRONE HYDROCHLORIDE 10 MG: 10 TABLET ORAL at 13:28

## 2019-12-11 RX ADMIN — GABAPENTIN 800 MG: 400 CAPSULE ORAL at 09:35

## 2019-12-11 RX ADMIN — MIRTAZAPINE 30 MG: 30 TABLET, FILM COATED ORAL at 21:17

## 2019-12-11 RX ADMIN — ACETAMINOPHEN 650 MG: 325 TABLET, FILM COATED ORAL at 16:05

## 2019-12-11 RX ADMIN — NICOTINE POLACRILEX 6 MG: 4 GUM, CHEWING ORAL at 20:04

## 2019-12-11 RX ADMIN — FLUPHENAZINE HYDROCHLORIDE 1 MG: 1 TABLET, FILM COATED ORAL at 09:35

## 2019-12-11 RX ADMIN — NICOTINE 1 PATCH: 14 PATCH, EXTENDED RELEASE TRANSDERMAL at 09:37

## 2019-12-11 RX ADMIN — GABAPENTIN 800 MG: 400 CAPSULE ORAL at 13:28

## 2019-12-11 RX ADMIN — NICOTINE POLACRILEX 4 MG: 4 GUM, CHEWING ORAL at 13:28

## 2019-12-11 RX ADMIN — NICOTINE POLACRILEX 6 MG: 4 GUM, CHEWING ORAL at 21:18

## 2019-12-11 RX ADMIN — GABAPENTIN 800 MG: 400 CAPSULE ORAL at 21:17

## 2019-12-11 RX ADMIN — NICOTINE POLACRILEX 6 MG: 4 GUM, CHEWING ORAL at 16:05

## 2019-12-11 RX ADMIN — QUETIAPINE FUMARATE 100 MG: 100 TABLET ORAL at 21:17

## 2019-12-11 RX ADMIN — TIZANIDINE 4 MG: 4 TABLET ORAL at 21:19

## 2019-12-11 RX ADMIN — LITHIUM CARBONATE 900 MG: 450 TABLET, EXTENDED RELEASE ORAL at 21:17

## 2019-12-11 RX ADMIN — LITHIUM CARBONATE 300 MG: 300 TABLET, EXTENDED RELEASE ORAL at 09:35

## 2019-12-11 RX ADMIN — TIZANIDINE 4 MG: 4 TABLET ORAL at 16:05

## 2019-12-11 RX ADMIN — Medication 1 MG: at 09:35

## 2019-12-11 RX ADMIN — ACETAMINOPHEN 650 MG: 325 TABLET, FILM COATED ORAL at 12:31

## 2019-12-11 RX ADMIN — ACETAMINOPHEN 650 MG: 325 TABLET, FILM COATED ORAL at 20:04

## 2019-12-11 RX ADMIN — BUSPIRONE HYDROCHLORIDE 10 MG: 10 TABLET ORAL at 09:35

## 2019-12-11 RX ADMIN — TIZANIDINE 4 MG: 4 TABLET ORAL at 09:35

## 2019-12-11 RX ADMIN — NICOTINE POLACRILEX 4 MG: 4 GUM, CHEWING ORAL at 10:58

## 2019-12-11 RX ADMIN — FEXOFENADINE HCL 60 MG: 60 TABLET, FILM COATED ORAL at 09:35

## 2019-12-11 RX ADMIN — Medication 1 MG: at 13:28

## 2019-12-11 RX ADMIN — PRAZOSIN HYDROCHLORIDE 2 MG: 2 CAPSULE ORAL at 21:17

## 2019-12-11 ASSESSMENT — ACTIVITIES OF DAILY LIVING (ADL)
ORAL_HYGIENE: INDEPENDENT
HYGIENE/GROOMING: INDEPENDENT
LAUNDRY: WITH SUPERVISION
DRESS: STREET CLOTHES

## 2019-12-11 NOTE — PROGRESS NOTES
12/11/19 1500   Occupational Therapy   Type of Intervention structured groups   Response Initiates, socially acceptable   Hours 2     Overall pleasant and engaged.      Mental health management group focused on mindfulness. Education was provided on the connection between mindfulness and quality of life, specifically within various though processes and occupations. Pt Response: Participated throughout discussion. Able to apply concepts to personal life; Reported knowledge and use of a variety of mindfulness practices such as meditation, positive affirmations, TIPPS, and radical acceptance. Desire to explore yoga and new hobbies to increase skills.     Occupational therapy clinic to explore poetry r/t relationship grief. Pt Response: Quite invested in specific poetry book as it relates to healing through her divorce. Checked out: [employer] by RK - to be returned to OT upon discharge.
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
## 2019-12-11 NOTE — PROGRESS NOTES
"  Pt has been visible in milieu throughout this shift. She is calm and cooperative with staff and peers. Reports she has been endorsing AH, but \"they're just bumbling back and forth\". Pt is looking forward to her interview with IRTS this Friday.    Pt denies current SI/SIB. No behavior concerns noted this shift.          12/10/19 2100   Behavioral Health   Hallucinations auditory   Thinking distractable   Orientation person: oriented;place: oriented   Memory baseline memory   Insight poor   Judgement impaired   Eye Contact at examiner   Affect blunted, flat   Mood mood is calm   Physical Appearance/Attire attire appropriate to age and situation   Hygiene well groomed   Suicidality   (pt denies si)   1. Wish to be Dead (Recent) No   2. Non-Specific Active Suicidal Thoughts (Recent) No   Self Injury   (pt denies sib)   Elopement   (none observed)   Activity   (visible in milieu)   Speech clear;coherent   Medication Sensitivity no observed side effects   Psychomotor / Gait balanced;steady   Activities of Daily Living   Hygiene/Grooming independent   Oral Hygiene independent   Dress independent   Room Organization independent     "

## 2019-12-12 LAB
ALBUMIN UR-MCNC: NEGATIVE MG/DL
AMORPH CRY #/AREA URNS HPF: ABNORMAL /HPF
APPEARANCE UR: ABNORMAL
BILIRUB UR QL STRIP: NEGATIVE
COLOR UR AUTO: YELLOW
GLUCOSE UR STRIP-MCNC: NEGATIVE MG/DL
HGB UR QL STRIP: ABNORMAL
KETONES UR STRIP-MCNC: NEGATIVE MG/DL
LEUKOCYTE ESTERASE UR QL STRIP: NEGATIVE
MUCOUS THREADS #/AREA URNS LPF: PRESENT /LPF
NITRATE UR QL: NEGATIVE
PH UR STRIP: 7 PH (ref 5–7)
RBC #/AREA URNS AUTO: <1 /HPF (ref 0–2)
SOURCE: ABNORMAL
SP GR UR STRIP: 1.02 (ref 1–1.03)
SQUAMOUS #/AREA URNS AUTO: 2 /HPF (ref 0–1)
UROBILINOGEN UR STRIP-MCNC: NORMAL MG/DL (ref 0–2)
WBC #/AREA URNS AUTO: 0 /HPF (ref 0–5)

## 2019-12-12 PROCEDURE — 99232 SBSQ HOSP IP/OBS MODERATE 35: CPT | Mod: GC | Performed by: PSYCHIATRY & NEUROLOGY

## 2019-12-12 PROCEDURE — G0177 OPPS/PHP; TRAIN & EDUC SERV: HCPCS

## 2019-12-12 PROCEDURE — 81001 URINALYSIS AUTO W/SCOPE: CPT | Performed by: STUDENT IN AN ORGANIZED HEALTH CARE EDUCATION/TRAINING PROGRAM

## 2019-12-12 PROCEDURE — 25000132 ZZH RX MED GY IP 250 OP 250 PS 637: Performed by: STUDENT IN AN ORGANIZED HEALTH CARE EDUCATION/TRAINING PROGRAM

## 2019-12-12 PROCEDURE — 25000128 H RX IP 250 OP 636: Performed by: STUDENT IN AN ORGANIZED HEALTH CARE EDUCATION/TRAINING PROGRAM

## 2019-12-12 PROCEDURE — 25000132 ZZH RX MED GY IP 250 OP 250 PS 637: Performed by: PSYCHIATRY & NEUROLOGY

## 2019-12-12 PROCEDURE — 12400001 ZZH R&B MH UMMC

## 2019-12-12 RX ADMIN — ACETAMINOPHEN 650 MG: 325 TABLET, FILM COATED ORAL at 13:03

## 2019-12-12 RX ADMIN — ACETAMINOPHEN 650 MG: 325 TABLET, FILM COATED ORAL at 16:29

## 2019-12-12 RX ADMIN — FLUPHENAZINE HYDROCHLORIDE 1 MG: 1 TABLET, FILM COATED ORAL at 20:25

## 2019-12-12 RX ADMIN — LITHIUM CARBONATE 900 MG: 450 TABLET, EXTENDED RELEASE ORAL at 20:25

## 2019-12-12 RX ADMIN — BENZTROPINE MESYLATE 1 MG: 1 TABLET ORAL at 18:46

## 2019-12-12 RX ADMIN — NICOTINE POLACRILEX 4 MG: 4 GUM, CHEWING ORAL at 10:42

## 2019-12-12 RX ADMIN — LITHIUM CARBONATE 300 MG: 300 TABLET, EXTENDED RELEASE ORAL at 09:47

## 2019-12-12 RX ADMIN — FLUPHENAZINE HYDROCHLORIDE 1 MG: 1 TABLET, FILM COATED ORAL at 09:46

## 2019-12-12 RX ADMIN — BUSPIRONE HYDROCHLORIDE 10 MG: 10 TABLET ORAL at 20:24

## 2019-12-12 RX ADMIN — TIZANIDINE 4 MG: 4 TABLET ORAL at 09:46

## 2019-12-12 RX ADMIN — Medication 1 MG: at 14:00

## 2019-12-12 RX ADMIN — PRAZOSIN HYDROCHLORIDE 2 MG: 2 CAPSULE ORAL at 20:24

## 2019-12-12 RX ADMIN — FEXOFENADINE HCL 60 MG: 60 TABLET, FILM COATED ORAL at 09:47

## 2019-12-12 RX ADMIN — GABAPENTIN 800 MG: 400 CAPSULE ORAL at 14:00

## 2019-12-12 RX ADMIN — NICOTINE POLACRILEX 4 MG: 4 GUM, CHEWING ORAL at 18:46

## 2019-12-12 RX ADMIN — NICOTINE POLACRILEX 4 MG: 4 GUM, CHEWING ORAL at 16:29

## 2019-12-12 RX ADMIN — NICOTINE POLACRILEX 4 MG: 4 GUM, CHEWING ORAL at 20:31

## 2019-12-12 RX ADMIN — NICOTINE POLACRILEX 4 MG: 4 GUM, CHEWING ORAL at 21:55

## 2019-12-12 RX ADMIN — GABAPENTIN 800 MG: 400 CAPSULE ORAL at 20:24

## 2019-12-12 RX ADMIN — TIZANIDINE 4 MG: 4 TABLET ORAL at 22:49

## 2019-12-12 RX ADMIN — ACETAMINOPHEN 650 MG: 325 TABLET, FILM COATED ORAL at 09:46

## 2019-12-12 RX ADMIN — ACETAMINOPHEN 650 MG: 325 TABLET, FILM COATED ORAL at 20:24

## 2019-12-12 RX ADMIN — IPRATROPIUM BROMIDE 2 SPRAY: 21 SPRAY NASAL at 20:29

## 2019-12-12 RX ADMIN — GABAPENTIN 800 MG: 400 CAPSULE ORAL at 09:47

## 2019-12-12 RX ADMIN — MIRTAZAPINE 30 MG: 30 TABLET, FILM COATED ORAL at 20:25

## 2019-12-12 RX ADMIN — BUSPIRONE HYDROCHLORIDE 10 MG: 10 TABLET ORAL at 14:00

## 2019-12-12 RX ADMIN — NICOTINE 1 PATCH: 14 PATCH, EXTENDED RELEASE TRANSDERMAL at 09:47

## 2019-12-12 RX ADMIN — QUETIAPINE FUMARATE 100 MG: 100 TABLET ORAL at 20:24

## 2019-12-12 RX ADMIN — NICOTINE POLACRILEX 4 MG: 4 GUM, CHEWING ORAL at 09:46

## 2019-12-12 RX ADMIN — NICOTINE POLACRILEX 4 MG: 4 GUM, CHEWING ORAL at 13:03

## 2019-12-12 RX ADMIN — BENZOYL PEROXIDE: 5 GEL TOPICAL at 20:28

## 2019-12-12 RX ADMIN — ONDANSETRON HYDROCHLORIDE 4 MG: 4 TABLET, FILM COATED ORAL at 11:37

## 2019-12-12 RX ADMIN — TIZANIDINE 4 MG: 4 TABLET ORAL at 16:29

## 2019-12-12 RX ADMIN — QUETIAPINE FUMARATE 50 MG: 50 TABLET ORAL at 18:46

## 2019-12-12 RX ADMIN — BUSPIRONE HYDROCHLORIDE 10 MG: 10 TABLET ORAL at 09:47

## 2019-12-12 RX ADMIN — Medication 1 MG: at 09:46

## 2019-12-12 ASSESSMENT — ACTIVITIES OF DAILY LIVING (ADL)
HYGIENE/GROOMING: INDEPENDENT
DRESS: INDEPENDENT
HYGIENE/GROOMING: INDEPENDENT
DRESS: INDEPENDENT
ORAL_HYGIENE: INDEPENDENT
LAUNDRY: WITH SUPERVISION
ORAL_HYGIENE: INDEPENDENT

## 2019-12-12 NOTE — PROGRESS NOTES
Pt attended the OT self awareness topic group that was more abstract in nature, involving using song titles to identify feelings, and talk about memories/life events.    Pt was respectful in listening and responding to peers. Pt shared only 2 songs - one she picked as it was a song she liked, the other was a song that was about relationships ending, and spoke of her marriage ending.  Asked to listen to her song at the end of group, and was able to do so, which she appreciated.

## 2019-12-12 NOTE — PROGRESS NOTES
" 12/11/19 2100   Art Therapy   Type of Intervention structured groups   Response participates with encouragement/ redirection   Hours 1   Treatment Detail   (Art Therapy- holiday decor)   Goal- cope, express, regulate and reflect through Art Therapy directives    Outcome- pt enjoyed the group. For the first part she observed and selected music. She often waits till she gets an idea and then she started a \" snow globe \" ornament. She is cutting out small pieces of paper and making a quite detailed winter scene she wants to continue with on the weekend.  She did a nice job in group. She showed writer the book she is reading and discussed writer might have a book for her to read was well , a humorous book. She was a bit gamey, asking writer if a certain patient was on unit [facility] still. Writer said \" I can't answer that.\" She then talked about calling that pt and writer made reference that she wasn't sure this would fit unit guidelines weather the pt was on the unit or not. She said \" the doctors from both sides are ok with it.\"  She also asked that writer see her for 1:1 over the weekend but wanted to make sure that did not conflict with a planned visit from the dance therapist that is visiting her 1:1 as well.   "

## 2019-12-12 NOTE — PROGRESS NOTES
Pt was more agitated by stimulation in the group session and much of the group activity, however, pt remained in session using coping skills.    Short 1:1 check-in after group, pt expressed hope for next steps in her treatment.       12/11/19 1015   Dance Movement Therapy   Response participates with encouragement   Hours 1
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
## 2019-12-12 NOTE — PLAN OF CARE
BEHAVIORAL TEAM DISCUSSION    Participants:   Dr. Hills, Dr. Echavarria, Luz Lincoln MA.LP, Med students, Pharm student    Progress:   Patient has been doing well since transferred.  No behavioral issues. Patient reports mood is stable. Patient has been slightly less isolative    Anticipated length of stay:  7 days    Continued Stay Criteria/Rationale:   Commitment/placement    Medical/Physical:   Stable    Precautions:   Behavioral Orders   Procedures     Code 1 - Restrict to Unit     Elopement precautions     Routine Programming     As clinically indicated     Self Injury Precaution     Sexual precautions     Suicide precautions     Patients on Suicide Precautions should have a Combination Diet ordered that includes a Diet selection(s) AND a Behavioral Tray selection for Safe Tray - with utensils, or Safe Tray - NO utensils       Plan:   Patient has  IRTS interviews set up with [facility], [facility] and [facility].  Will await call back following interviews.    Rationale for change in precautions or plan:  No change in plan/precautions

## 2019-12-12 NOTE — PLAN OF CARE
"48 hour assessment:  out in milieu all morning , attended groups.  Denies suicidal thoughts , thoughts of self harm.  Endorses auditory hallucinations, states they are \"mumbling voices that are always there\".  Speech is monotone. Calm cooperative and pleasant. Reports plan is for discharge to [facility]. States has interview today and another one tomorrow.   "

## 2019-12-12 NOTE — PROGRESS NOTES
Patient attended mental health management group focuing on discharge planning, relapse prevention.  Patient actively participated and shared what plans she currently has in place.

## 2019-12-12 NOTE — PROGRESS NOTES
Patient reports good mood, denies SI/SIB. Patient states she is experiencing no significantly anxiety or depression and feels ready to discharge. Patient looks forward to interviewing with IRTS facilities in the coming week. Patient was visible in the milieu with full range affect but mostly withdrawn.     12/11/19 2100   Behavioral Health   Hallucinations denies / not responding to hallucinations   Thinking intact   Orientation person: oriented;place: oriented;date: oriented;time: oriented   Memory baseline memory   Insight admits / accepts   Judgement impaired   Eye Contact at examiner   Affect full range affect   Mood mood is calm   Physical Appearance/Attire appears stated age;attire appropriate to age and situation   Hygiene well groomed   Suicidality other (see comments)  (Denies)   1. Wish to be Dead (Recent) No   2. Non-Specific Active Suicidal Thoughts (Recent) No   Self Injury other (see comment)  (Denies)   Activity withdrawn   Speech clear;coherent   Medication Sensitivity other (see comment)  (Increased apetite.)   Psychomotor / Gait balanced;steady   Activities of Daily Living   Hygiene/Grooming independent   Oral Hygiene independent   Dress street clothes   Laundry with supervision   Room Organization independent

## 2019-12-13 ENCOUNTER — APPOINTMENT (OUTPATIENT)
Dept: ULTRASOUND IMAGING | Facility: CLINIC | Age: 29
End: 2019-12-13
Attending: PHYSICIAN ASSISTANT
Payer: COMMERCIAL

## 2019-12-13 DIAGNOSIS — F25.0 SCHIZOAFFECTIVE DISORDER, BIPOLAR TYPE (H): Primary | ICD-10-CM

## 2019-12-13 PROCEDURE — H2032 ACTIVITY THERAPY, PER 15 MIN: HCPCS

## 2019-12-13 PROCEDURE — 25000132 ZZH RX MED GY IP 250 OP 250 PS 637: Performed by: STUDENT IN AN ORGANIZED HEALTH CARE EDUCATION/TRAINING PROGRAM

## 2019-12-13 PROCEDURE — 99231 SBSQ HOSP IP/OBS SF/LOW 25: CPT | Mod: GC | Performed by: PSYCHIATRY & NEUROLOGY

## 2019-12-13 PROCEDURE — 76770 US EXAM ABDO BACK WALL COMP: CPT

## 2019-12-13 PROCEDURE — 12400001 ZZH R&B MH UMMC

## 2019-12-13 PROCEDURE — 99221 1ST HOSP IP/OBS SF/LOW 40: CPT | Performed by: PHYSICIAN ASSISTANT

## 2019-12-13 PROCEDURE — 25000132 ZZH RX MED GY IP 250 OP 250 PS 637: Performed by: PSYCHIATRY & NEUROLOGY

## 2019-12-13 PROCEDURE — G0177 OPPS/PHP; TRAIN & EDUC SERV: HCPCS

## 2019-12-13 PROCEDURE — 99207 ZZC CONSULT E&M CHANGED TO INITIAL LEVEL: CPT | Performed by: PHYSICIAN ASSISTANT

## 2019-12-13 RX ORDER — PRAZOSIN HYDROCHLORIDE 2 MG/1
2 CAPSULE ORAL AT BEDTIME
Qty: 30 CAPSULE | Refills: 0 | Status: SHIPPED | OUTPATIENT
Start: 2019-12-13 | End: 2020-01-17

## 2019-12-13 RX ORDER — FLUPHENAZINE DECANOATE 25 MG/ML
25 INJECTION, SOLUTION INTRAMUSCULAR; SUBCUTANEOUS
Qty: 6 ML | Refills: 0 | Status: SHIPPED | OUTPATIENT
Start: 2019-12-13 | End: 2019-12-18

## 2019-12-13 RX ORDER — BUSPIRONE HYDROCHLORIDE 10 MG/1
10 TABLET ORAL 3 TIMES DAILY
Qty: 90 TABLET | Refills: 0 | Status: SHIPPED | OUTPATIENT
Start: 2019-12-13 | End: 2020-01-17

## 2019-12-13 RX ORDER — BENZTROPINE MESYLATE 1 MG/1
1 TABLET ORAL 3 TIMES DAILY PRN
Qty: 60 TABLET | Refills: 0 | Status: SHIPPED | OUTPATIENT
Start: 2019-12-13 | End: 2020-02-21

## 2019-12-13 RX ORDER — FLUPHENAZINE HYDROCHLORIDE 1 MG/1
1 TABLET ORAL 2 TIMES DAILY
Qty: 60 TABLET | Refills: 0 | Status: SHIPPED | OUTPATIENT
Start: 2019-12-13 | End: 2020-01-17

## 2019-12-13 RX ORDER — MIRTAZAPINE 30 MG/1
30 TABLET, FILM COATED ORAL AT BEDTIME
Qty: 30 TABLET | Refills: 0 | Status: SHIPPED | OUTPATIENT
Start: 2019-12-13 | End: 2020-01-17

## 2019-12-13 RX ORDER — LITHIUM CARBONATE 450 MG
900 TABLET, EXTENDED RELEASE ORAL EVERY EVENING
Qty: 60 TABLET | Refills: 0 | Status: SHIPPED | OUTPATIENT
Start: 2019-12-13 | End: 2020-01-17

## 2019-12-13 RX ORDER — FLUPHENAZINE HYDROCHLORIDE 1 MG/1
1 TABLET ORAL 2 TIMES DAILY
Qty: 60 TABLET | Refills: 0 | Status: SHIPPED | OUTPATIENT
Start: 2019-12-13 | End: 2019-12-13

## 2019-12-13 RX ORDER — TRAZODONE HYDROCHLORIDE 50 MG/1
50 TABLET, FILM COATED ORAL
Qty: 30 TABLET | Refills: 0 | Status: SHIPPED | OUTPATIENT
Start: 2019-12-13 | End: 2020-02-05

## 2019-12-13 RX ORDER — TRAZODONE HYDROCHLORIDE 50 MG/1
50 TABLET, FILM COATED ORAL
Qty: 30 TABLET | Refills: 0 | Status: SHIPPED | OUTPATIENT
Start: 2019-12-13 | End: 2019-12-13

## 2019-12-13 RX ORDER — LITHIUM CARBONATE 300 MG/1
TABLET, FILM COATED, EXTENDED RELEASE ORAL
Qty: 90 TABLET | Refills: 0 | Status: SHIPPED | OUTPATIENT
Start: 2019-12-13 | End: 2019-12-13

## 2019-12-13 RX ORDER — GABAPENTIN 300 MG/1
600 CAPSULE ORAL 3 TIMES DAILY
Qty: 90 CAPSULE | Refills: 0 | Status: SHIPPED | OUTPATIENT
Start: 2019-12-13 | End: 2020-01-10

## 2019-12-13 RX ORDER — GABAPENTIN 300 MG/1
600 CAPSULE ORAL 3 TIMES DAILY
Qty: 90 CAPSULE | Refills: 0 | Status: SHIPPED | OUTPATIENT
Start: 2019-12-13 | End: 2019-12-13

## 2019-12-13 RX ADMIN — FEXOFENADINE HCL 60 MG: 60 TABLET, FILM COATED ORAL at 08:47

## 2019-12-13 RX ADMIN — GABAPENTIN 800 MG: 400 CAPSULE ORAL at 21:37

## 2019-12-13 RX ADMIN — FLUPHENAZINE HYDROCHLORIDE 1 MG: 1 TABLET, FILM COATED ORAL at 08:47

## 2019-12-13 RX ADMIN — FLUPHENAZINE HYDROCHLORIDE 1 MG: 1 TABLET, FILM COATED ORAL at 21:36

## 2019-12-13 RX ADMIN — NICOTINE 1 PATCH: 14 PATCH, EXTENDED RELEASE TRANSDERMAL at 08:46

## 2019-12-13 RX ADMIN — ACETAMINOPHEN 650 MG: 325 TABLET, FILM COATED ORAL at 13:44

## 2019-12-13 RX ADMIN — ACETAMINOPHEN 650 MG: 325 TABLET, FILM COATED ORAL at 16:05

## 2019-12-13 RX ADMIN — BUSPIRONE HYDROCHLORIDE 10 MG: 10 TABLET ORAL at 08:47

## 2019-12-13 RX ADMIN — NICOTINE POLACRILEX 6 MG: 4 GUM, CHEWING ORAL at 20:25

## 2019-12-13 RX ADMIN — BUSPIRONE HYDROCHLORIDE 10 MG: 10 TABLET ORAL at 21:37

## 2019-12-13 RX ADMIN — NICOTINE POLACRILEX 6 MG: 4 GUM, CHEWING ORAL at 16:05

## 2019-12-13 RX ADMIN — NICOTINE POLACRILEX 4 MG: 4 GUM, CHEWING ORAL at 11:18

## 2019-12-13 RX ADMIN — ACETAMINOPHEN 650 MG: 325 TABLET, FILM COATED ORAL at 08:47

## 2019-12-13 RX ADMIN — NICOTINE POLACRILEX 4 MG: 4 GUM, CHEWING ORAL at 10:08

## 2019-12-13 RX ADMIN — NICOTINE POLACRILEX 4 MG: 4 GUM, CHEWING ORAL at 18:45

## 2019-12-13 RX ADMIN — MIRTAZAPINE 30 MG: 30 TABLET, FILM COATED ORAL at 21:36

## 2019-12-13 RX ADMIN — LITHIUM CARBONATE 900 MG: 450 TABLET, EXTENDED RELEASE ORAL at 21:38

## 2019-12-13 RX ADMIN — GABAPENTIN 800 MG: 400 CAPSULE ORAL at 13:44

## 2019-12-13 RX ADMIN — BUSPIRONE HYDROCHLORIDE 10 MG: 10 TABLET ORAL at 13:44

## 2019-12-13 RX ADMIN — PRAZOSIN HYDROCHLORIDE 2 MG: 2 CAPSULE ORAL at 21:36

## 2019-12-13 RX ADMIN — ACETAMINOPHEN 650 MG: 325 TABLET, FILM COATED ORAL at 21:36

## 2019-12-13 RX ADMIN — NICOTINE POLACRILEX 4 MG: 4 GUM, CHEWING ORAL at 12:26

## 2019-12-13 RX ADMIN — Medication 1 MG: at 08:47

## 2019-12-13 RX ADMIN — TIZANIDINE 4 MG: 4 TABLET ORAL at 08:47

## 2019-12-13 RX ADMIN — NICOTINE POLACRILEX 6 MG: 4 GUM, CHEWING ORAL at 17:19

## 2019-12-13 RX ADMIN — NICOTINE POLACRILEX 4 MG: 4 GUM, CHEWING ORAL at 13:48

## 2019-12-13 RX ADMIN — Medication 1 MG: at 13:44

## 2019-12-13 RX ADMIN — LITHIUM CARBONATE 300 MG: 300 TABLET, EXTENDED RELEASE ORAL at 08:47

## 2019-12-13 RX ADMIN — TIZANIDINE 4 MG: 4 TABLET ORAL at 17:19

## 2019-12-13 RX ADMIN — GABAPENTIN 800 MG: 400 CAPSULE ORAL at 08:47

## 2019-12-13 RX ADMIN — QUETIAPINE FUMARATE 100 MG: 100 TABLET ORAL at 21:37

## 2019-12-13 RX ADMIN — NICOTINE POLACRILEX 4 MG: 4 GUM, CHEWING ORAL at 08:47

## 2019-12-13 ASSESSMENT — ACTIVITIES OF DAILY LIVING (ADL)
DRESS: INDEPENDENT
ORAL_HYGIENE: INDEPENDENT
LAUNDRY: WITH SUPERVISION
DRESS: INDEPENDENT
HYGIENE/GROOMING: INDEPENDENT
HYGIENE/GROOMING: INDEPENDENT
ORAL_HYGIENE: INDEPENDENT

## 2019-12-13 NOTE — PROGRESS NOTES
Patient interviewed with [facility] an has been accepted.  She can discharge on 12/17/19.  Patient has been referred to the [facility] Psych clinic for medication mgmt.  She is on the restricted recipient program and her Primary Care provider - Becki Escobar will be sending a referral to the [facility] for this.  I spoke to the RRP ([phone])  and they will lift her prescription restriction today so that Dr. Hills can order discharge meds.  I have left a msg with the Psych clinic to schedule a Prolixin injection which will be due 12/24/19.  PD will be written up and patient's [facility] CM will be here Monday to see patient and go over the PD.

## 2019-12-13 NOTE — PROGRESS NOTES
12/13/19 [facility]   Occupational Therapy   Type of Intervention structured groups   Response Initiates, socially acceptable   Hours 1     Attended 1/3 occupational therapy groups offered this date. Overall congruent-bright affect and full engagement.
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
## 2019-12-13 NOTE — PROGRESS NOTES
Patient denies SI/SIB and VH, but endorses auditory hallucinations. Patient was visible in the milieu for nearly the entire shift and presented with a calm mood and blunted, flat affect.        12/12/19 2100   Behavioral Health   Hallucinations auditory   Thinking intact   Orientation place: oriented;person: oriented;date: oriented;time: oriented   Memory baseline memory   Insight insight appropriate to situation;insight appropriate to events   Judgement impaired   Eye Contact at examiner   Affect blunted, flat   Mood mood is calm   Physical Appearance/Attire neat   Hygiene well groomed   Suicidality other (see comments)  (Denies)   1. Wish to be Dead (Recent) No   2. Non-Specific Active Suicidal Thoughts (Recent) No   Activities of Daily Living   Hygiene/Grooming independent   Oral Hygiene independent   Dress independent   Laundry with supervision   Room Organization independent

## 2019-12-13 NOTE — PROGRESS NOTES
Patient has been visible in the hallway but withdrawn. Reported that she is ffeeling hopeful but nervous and anxious about discharge because she doesn't do well in a new environment. The patient attended community meeting. Patient was pleasant and cooperative this shift.        12/13/19 1200   Behavioral Health   Elopement   (none  observed )   Psycho Education   Type of Intervention 1:1 intervention   Response participates, initiates socially appropriate   Hours 0.5   Treatment Detail   (check-in)   Safety   Suicidality Status 15   Activities of Daily Living   Hygiene/Grooming independent   Oral Hygiene independent   Dress independent   Laundry with supervision   Room Organization independent   Groups   Details   (attended 2 groups)

## 2019-12-13 NOTE — CONSULTS
[facility]  Internal Medicine Consult     Ayana Prasad MRN# [id number]   Age: 29 year old YOB: 1990     Date of Admission: 10/22/2019  Date of Consult:  12/13/2019    Requesting Service: Behavioral Health - Joao Morin MD         Reason for Consult:   R flank pain         Assessment and Recommendations:   Ayana Prasad is a 29 year old female with a hx of TBI, polysubstance abuse, ADHD, bipolar type schizoaffective disorder, PTSD, depression and borderline personality disorder who was admitted to [facility] on 10/22 due to decompensated psychiatric state. IM was asked to see this patient in regards to flank pain.     # R flank pain.  Pt developed R flank pain on 12/11. Pain came on all of a sudden. Has not changed despite tylenol and tizanidine. No fevers. No gross hematuria. Moderate blood on UA, otherwise unremarkable. Pt endorses hx of nephrolithiasis in the past.   - Obtain Renal US to r/o obstructing stone/hydronephrosis  - Continue tylenol prn and tizanidine for pain control    Multiple chronic psychiatric illnesses. Management per primary team, psychiatry.        IM will follow for results of renal US.    Lorelei Caba PA-C  Hospitalist Service  [phone]           History of Present Illness:   Ayana Prasad is a 29 year old female with a hx of TBI, polysubstance abuse, ADHD, bipolar type schizoaffective disorder, PTSD, depression and borderline personality disorder who was admitted to [facility] on 10/22 due to decompensated psychiatric state. IM was asked to see this patient in regards to flank pain.   Pain started 2 days ago, came on all of a sudden, rates the pain as 8/10. Pain has not changed since that time. Has been taking tylenol and muscle relaxant without significant benefit. No fevers, chills, nausea or vomiting. Tolerating diet and ambulating around unit without difficulty. Notes hx of nephrolithiasis in the past.  Denies chest pain,  shortness of breath or difficulty breathing.          Review of Systems:   A 10 point review of systems was performed and is negative unless otherwise noted in HPI.          Past Medical History:     Past Medical History:   Diagnosis Date     ADHD (attention deficit hyperactivity disorder)      Bipolar 1 disorder      Borderline personality disorder      Cauda equina syndrome      Chronic low back pain      Depression      Depressive disorder      GERD (gastroesophageal reflux disease)      h/o TBI (traumatic brain injury)      Marginal corneal ulcer, left 7/17/2015     Nephrolithiasis      Polysubstance abuse - methamphetamine, hallucinagen, heroin, marijuana     currently in remission     PONV (postoperative nausea and vomiting)      PTSD (post-traumatic stress disorder)              Past Surgical History:      Past Surgical History:   Procedure Laterality Date     BACK SURGERY - For Cauda Equina Syndrome  12/24/2016     COLONOSCOPY       COMBINED CYSTOSCOPY, INSERT STENT URETER(S) Left 8/30/2018    Procedure: COMBINED CYSTOSCOPY, INSERT STENT URETER(S);  Cystoscopy With Left Stent Placement;  Surgeon: Kiran Ulrich MD;  Location: [facility] OR     COMBINED CYSTOSCOPY, RETROGRADES, EXCHANGE STENT URETER(S) Left 10/14/2018    Procedure: COMBINED CYSTOSCOPY, RETROGRADES, EXCHANGE STENT URETER(S);  Cystoscopy and removal of left-sided stent.;  Surgeon: Stiven Olivo MD;  Location:  OR     COMBINED CYSTOSCOPY, RETROGRADES, URETEROSCOPY, INSERT STENT  1/6/2014    Procedure: COMBINED CYSTOSCOPY, RETROGRADES, URETEROSCOPY, INSERT STENT;  Cystyoscopy place left ureteral stent;  Surgeon: Jaun Kimble MD;  Location: [facility] OR     COMBINED CYSTOSCOPY, RETROGRADES, URETEROSCOPY, INSERT STENT Left 10/23/2018    Procedure: Video cystoscopy, left ureteroscopy with stone extraction;  Surgeon: Bull Mast MD;  Location:  OR     CYSTOSCOPY, URETEROSCOPY, COMBINED Right 9/23/2015    Procedure: COMBINED  CYSTOSCOPY, URETEROSCOPY;  Surgeon: ROME Jett MD;  Location: [facility] OR     ENT SURGERY       ESOPHAGOSCOPY, GASTROSCOPY, DUODENOSCOPY (EGD), COMBINED  4/8/2013    Procedure: COMBINED ESOPHAGOSCOPY, GASTROSCOPY, DUODENOSCOPY (EGD), BIOPSY SINGLE OR MULTIPLE;  Gastroscopy;  Surgeon: Peter Pickard MD;  Location: [facility] GI     ESOPHAGOSCOPY, GASTROSCOPY, DUODENOSCOPY (EGD), COMBINED Left 10/28/2014    Procedure: COMBINED ESOPHAGOSCOPY, GASTROSCOPY, DUODENOSCOPY (EGD), BIOPSY SINGLE OR MULTIPLE;  Surgeon: Narcisa Ramirez MD;  Location:  OR     ESOPHAGOSCOPY, GASTROSCOPY, DUODENOSCOPY (EGD), COMBINED N/A 12/24/2018    Procedure: COMBINED ESOPHAGOSCOPY, GASTROSCOPY, DUODENOSCOPY (EGD), BIOPSY SINGLE OR MULTIPLE;  Surgeon: Sonu Verduzco MD;  Location: [facility]     LAPAROSCOPIC CHOLECYSTECTOMY  11/20/2014    [facility], Dr. Ramirez     LASER HOLMIUM LITHOTRIPSY URETER(S), INSERT STENT, COMBINED  4/2/2014    Procedure: COMBINED CYSTOSCOPY, URETEROSCOPY, LASER HOLMIUM LITHOTRIPSY URETER(S), INSERT STENT;  Cystoscopy,Left Ureteral Stent Removal,Left Ureteroscopy with Laser Lithotripsy,Left Ureter Stent Placement;  Surgeon: ROME Jett MD;  Location: [facility] OR     Transurethral stone resection  03/11/2011             Family History:   Family history was reviewed.      Family History   Problem Relation Age of Onset     Gastrointestinal Disease Father         Crohn's Disease     Cancer Father         Liver Cancer     Other Cancer Father         Liver     Cancer Maternal Grandmother         lympoma     Diabetes Maternal Grandmother      Mental Illness Maternal Grandmother      Other Cancer Maternal Grandmother         Non Hodgkins Lymphoma     Diabetes Maternal Grandfather      Hypertension Maternal Grandfather      Prostate Cancer Maternal Grandfather      Hyperlipidemia Maternal Grandfather      Heart Disease Paternal Grandfather         heart disease     Hypertension Brother      Other Cancer Brother          Esophegeal cancer     Diabetes Brother      Hyperlipidemia Mother      Mental Illness Mother      Anxiety Disorder Mother      Anesthesia Reaction Mother      Hypertension Brother      Other Cancer Brother      Other Cancer Paternal Half-Brother         Esophageal             Social History:     Social History     Tobacco Use     Smoking status: Former Smoker     Packs/day: 0.50     Years: 5.00     Pack years: 2.50     Types: Other     Start date: 8/1/2011     Smokeless tobacco: Current User     Types: Chew   Substance Use Topics     Alcohol use: No     Alcohol/week: 0.0 standard drinks             Medications:   No current facility-administered medications on file prior to encounter.   No current outpatient medications on file prior to encounter.      Current Facility-Administered Medications   Medication     acetaminophen (TYLENOL) tablet 650 mg     alum & mag hydroxide-simethicone (MYLANTA ES/MAALOX  ES) suspension 30 mL     benzocaine-menthol (CEPACOL) 15-3.6 MG lozenge 1 lozenge     benzoyl peroxide 5 % topical gel     benztropine (COGENTIN) tablet 1 mg     busPIRone (BUSPAR) tablet 10 mg     docosanol (ABREVA) 10 % cream     fexofenadine (ALLEGRA) tablet 60 mg     fluPHENAZine (PROLIXIN) tablet 1 mg     fluPHENAZine decanoate (PROLIXIN) injection 6.25 mg     gabapentin (NEURONTIN) capsule 800 mg     guanFACINE (TENEX) tablet 1 mg     ipratropium (ATROVENT) 0.03 % spray 2 spray     lithium (ESKALITH CR/LITHOBID) CR tablet 900 mg     lithium ER (LITHOBID/ESKALITH CR) CR tablet 300 mg     magnesium hydroxide (MILK OF MAGNESIA) suspension 30 mL     menthol (ICY HOT) 5 % patch 1 patch    And     menthol (ICY HOT) Patch in Place    And     menthol (ICY HOT) patch REMOVAL     mirtazapine (REMERON) tablet 30 mg     nicotine (NICODERM CQ) 14 MG/24HR 24 hr patch 1 patch     nicotine (NICORETTE) gum 4-8 mg     nicotine Patch in Place     nicotine patch REMOVAL     ondansetron (ZOFRAN) tablet 4 mg     polyethylene glycol  "(MIRALAX/GLYCOLAX) Packet 17 g     prazosin (MINIPRESS) capsule 2 mg     QUEtiapine (SEROquel) tablet 100 mg     QUEtiapine (SEROquel) tablet 50 mg     sodium chloride (OCEAN) 0.65 % nasal spray 1 spray     tiZANidine (ZANAFLEX) tablet 4 mg            Allergies:     Allergies   Allergen Reactions     Haldol [Haloperidol] Other (See Comments)     Makes patient very angry and anxious     Adhesive Tape Hives     Percocet [Oxycodone-Acetaminophen] Nausea and Vomiting     Prednisone Other (See Comments) and Hives     Suicidal ideation     Risperidone Other (See Comments)     Tramadol Hcl Nausea and Vomiting     Droperidol Anxiety     Seroquel [Quetiapine] Palpitations     Spent 2 weeks in the hospital due to having seroquel, caused palpitations and QT prolongation             Physical Exam:   /86   Pulse 80   Temp 98  F (36.7  C) (Oral)   Resp 16   Ht 1.651 m (5' 5\")   Wt 101.4 kg (223 lb 8 oz)   SpO2 96%   BMI 37.19 kg/m     GENERAL: Alert and oriented x 3. NAD.   HEENT: Anicteric sclera. PERRL. Mucous membranes moist.   CV: RRR. S1, S2. No murmurs appreciated.   RESPIRATORY: Effort normal. Lungs CTAB with no wheezing, rales, rhonchi.   GI: Abdomen soft and non distended with normoactive bowel sounds present in all quadrants. No tenderness, rebound, guarding.   MUSCULOSKELETAL: No joint swelling or tenderness.   NEUROLOGICAL: No focal deficits. Moves all extremities.   EXTREMITIES: No peripheral edema. Intact bilateral pedal pulses.   SKIN: No jaundice. No rashes.          Labs:   CBC:  Recent Labs   Lab Test 10/23/19  0737   WBC 10.9   RBC 5.15   HGB 14.3   HCT 43.6   MCV 85   MCH 27.8   MCHC 32.8   RDW 13.3          CMP:  Recent Labs   Lab Test 11/07/19  1150      POTASSIUM 3.9   CHLORIDE 109   WILLIAMS 9.5   CO2 22   BUN 18   CR 0.58   *   AST 15   ALT 36   BILITOTAL 0.3   ALBUMIN 3.8   PROTTOTAL 7.8   ALKPHOS 49       TSH:  TSH   Date Value Ref Range Status   10/23/2019 3.62 0.40 - 4.00 " mU/L Final             Lorelei Caba PA-C  Hospitalist Service  [phone]

## 2019-12-14 PROCEDURE — 25000132 ZZH RX MED GY IP 250 OP 250 PS 637: Performed by: STUDENT IN AN ORGANIZED HEALTH CARE EDUCATION/TRAINING PROGRAM

## 2019-12-14 PROCEDURE — 12400001 ZZH R&B MH UMMC

## 2019-12-14 PROCEDURE — 90837 PSYTX W PT 60 MINUTES: CPT

## 2019-12-14 PROCEDURE — H2032 ACTIVITY THERAPY, PER 15 MIN: HCPCS

## 2019-12-14 PROCEDURE — 25000132 ZZH RX MED GY IP 250 OP 250 PS 637: Performed by: PSYCHIATRY & NEUROLOGY

## 2019-12-14 RX ADMIN — ACETAMINOPHEN 650 MG: 325 TABLET, FILM COATED ORAL at 09:47

## 2019-12-14 RX ADMIN — LITHIUM CARBONATE 300 MG: 300 TABLET, EXTENDED RELEASE ORAL at 09:46

## 2019-12-14 RX ADMIN — QUETIAPINE FUMARATE 100 MG: 100 TABLET ORAL at 19:00

## 2019-12-14 RX ADMIN — TIZANIDINE 4 MG: 4 TABLET ORAL at 03:31

## 2019-12-14 RX ADMIN — FLUPHENAZINE HYDROCHLORIDE 1 MG: 1 TABLET, FILM COATED ORAL at 09:47

## 2019-12-14 RX ADMIN — NICOTINE POLACRILEX 4 MG: 4 GUM, CHEWING ORAL at 19:02

## 2019-12-14 RX ADMIN — NICOTINE POLACRILEX 6 MG: 4 GUM, CHEWING ORAL at 22:01

## 2019-12-14 RX ADMIN — GABAPENTIN 800 MG: 400 CAPSULE ORAL at 09:46

## 2019-12-14 RX ADMIN — NICOTINE POLACRILEX 4 MG: 4 GUM, CHEWING ORAL at 09:58

## 2019-12-14 RX ADMIN — NICOTINE 1 PATCH: 14 PATCH, EXTENDED RELEASE TRANSDERMAL at 09:47

## 2019-12-14 RX ADMIN — BUSPIRONE HYDROCHLORIDE 10 MG: 10 TABLET ORAL at 09:47

## 2019-12-14 RX ADMIN — GABAPENTIN 800 MG: 400 CAPSULE ORAL at 19:00

## 2019-12-14 RX ADMIN — FEXOFENADINE HCL 60 MG: 60 TABLET, FILM COATED ORAL at 09:47

## 2019-12-14 RX ADMIN — Medication 1 MG: at 13:09

## 2019-12-14 RX ADMIN — ACETAMINOPHEN 650 MG: 325 TABLET, FILM COATED ORAL at 16:47

## 2019-12-14 RX ADMIN — BENZOYL PEROXIDE: 5 GEL TOPICAL at 22:02

## 2019-12-14 RX ADMIN — QUETIAPINE FUMARATE 50 MG: 50 TABLET ORAL at 17:52

## 2019-12-14 RX ADMIN — NICOTINE POLACRILEX 4 MG: 4 GUM, CHEWING ORAL at 16:47

## 2019-12-14 RX ADMIN — Medication 1 MG: at 09:46

## 2019-12-14 RX ADMIN — NICOTINE POLACRILEX 4 MG: 4 GUM, CHEWING ORAL at 10:56

## 2019-12-14 RX ADMIN — NICOTINE POLACRILEX 4 MG: 4 GUM, CHEWING ORAL at 13:09

## 2019-12-14 RX ADMIN — ACETAMINOPHEN 650 MG: 325 TABLET, FILM COATED ORAL at 19:47

## 2019-12-14 RX ADMIN — ACETAMINOPHEN 650 MG: 325 TABLET, FILM COATED ORAL at 11:41

## 2019-12-14 RX ADMIN — PRAZOSIN HYDROCHLORIDE 2 MG: 2 CAPSULE ORAL at 20:19

## 2019-12-14 RX ADMIN — LITHIUM CARBONATE 900 MG: 450 TABLET, EXTENDED RELEASE ORAL at 19:47

## 2019-12-14 RX ADMIN — TIZANIDINE 4 MG: 4 TABLET ORAL at 11:41

## 2019-12-14 RX ADMIN — TIZANIDINE 4 MG: 4 TABLET ORAL at 22:01

## 2019-12-14 RX ADMIN — GABAPENTIN 800 MG: 400 CAPSULE ORAL at 13:09

## 2019-12-14 RX ADMIN — BUSPIRONE HYDROCHLORIDE 10 MG: 10 TABLET ORAL at 13:09

## 2019-12-14 RX ADMIN — FLUPHENAZINE HYDROCHLORIDE 1 MG: 1 TABLET, FILM COATED ORAL at 19:47

## 2019-12-14 RX ADMIN — BUSPIRONE HYDROCHLORIDE 10 MG: 10 TABLET ORAL at 19:00

## 2019-12-14 RX ADMIN — MIRTAZAPINE 30 MG: 30 TABLET, FILM COATED ORAL at 20:19

## 2019-12-14 RX ADMIN — NICOTINE POLACRILEX 4 MG: 4 GUM, CHEWING ORAL at 19:47

## 2019-12-14 ASSESSMENT — ACTIVITIES OF DAILY LIVING (ADL)
HYGIENE/GROOMING: INDEPENDENT
ORAL_HYGIENE: INDEPENDENT
HYGIENE/GROOMING: INDEPENDENT
DRESS: INDEPENDENT
ORAL_HYGIENE: INDEPENDENT
DRESS: INDEPENDENT
LAUNDRY: WITH SUPERVISION

## 2019-12-14 NOTE — PROGRESS NOTES
12/13/19 2200   Significant Event   Significant Event Other (see comments)  (shift summary)     Pt was withdrawn from peers during the shift, watched TV, participated in group.  Calm, cooperative, anxious. No stated SI, SIB or hallucinations.  Independent with ADLS, showered.

## 2019-12-14 NOTE — PROGRESS NOTES
"Patient slept in late this morning, but as been visible in milieu since getting up. Patient ate meals and has been med compliant. Patient keeps to herself mostly. Denies suicidal ideation and self injurious thoughts. Denies anxiety and depression. Denies auditory and visual hallucinations. Requested prn Zanaflex.     Patient evaluation continues. Assessed mood,anxiety,thoughts and behavior.     Patient gradually progressing towards goals.    Patient is encouraged to participate in groups and assisted to develop healthy coping skills.     VS reviewed: /75   Pulse 76   Temp 97.7  F (36.5  C) (Oral)   Resp 16   Ht 1.651 m (5' 5\")   Wt 101.4 kg (223 lb 8 oz)   SpO2 96%   BMI 37.19 kg/m      Length of stay: 53    Refer to daily team meeting notes for individualized plan of care. Nursing will continue to assess.      "

## 2019-12-14 NOTE — PROGRESS NOTES
"Brief Medicine Note    IM following for R flank pain. Renal US obtained 12/13 showing 2 non obstructing R renal calculi, the largest measuring 8mm, no R hydronephrosis. Coincidentally noted to have L hydronephrosis, but no clinically correlating findings.   Would continues to treat pt symptomatically. No further intervention indicated at this time.     Internal Medicine will sign off at this time. Please notify if any intercurrent medical questions or concerns arise. Thank you for involving me in this patients care.         Lorelei Caba PA-C  Hospitalist Service  [phone]        /75   Pulse 76   Temp 97.7  F (36.5  C) (Oral)   Resp 16   Ht 1.651 m (5' 5\")   Wt 101.4 kg (223 lb 8 oz)   SpO2 96%   BMI 37.19 kg/m          Lorelei Caba PA-C  Hospitalist Service  Pager [phone]      "

## 2019-12-15 PROCEDURE — 25000132 ZZH RX MED GY IP 250 OP 250 PS 637: Performed by: PSYCHIATRY & NEUROLOGY

## 2019-12-15 PROCEDURE — 25000132 ZZH RX MED GY IP 250 OP 250 PS 637: Performed by: STUDENT IN AN ORGANIZED HEALTH CARE EDUCATION/TRAINING PROGRAM

## 2019-12-15 PROCEDURE — 90837 PSYTX W PT 60 MINUTES: CPT

## 2019-12-15 PROCEDURE — 12400001 ZZH R&B MH UMMC

## 2019-12-15 RX ADMIN — ACETAMINOPHEN 650 MG: 325 TABLET, FILM COATED ORAL at 21:05

## 2019-12-15 RX ADMIN — ACETAMINOPHEN 650 MG: 325 TABLET, FILM COATED ORAL at 09:39

## 2019-12-15 RX ADMIN — GABAPENTIN 800 MG: 400 CAPSULE ORAL at 21:05

## 2019-12-15 RX ADMIN — BUSPIRONE HYDROCHLORIDE 10 MG: 10 TABLET ORAL at 14:08

## 2019-12-15 RX ADMIN — ACETAMINOPHEN 650 MG: 325 TABLET, FILM COATED ORAL at 12:49

## 2019-12-15 RX ADMIN — Medication 1 MG: at 14:08

## 2019-12-15 RX ADMIN — FLUPHENAZINE HYDROCHLORIDE 1 MG: 1 TABLET, FILM COATED ORAL at 09:39

## 2019-12-15 RX ADMIN — BUSPIRONE HYDROCHLORIDE 10 MG: 10 TABLET ORAL at 09:39

## 2019-12-15 RX ADMIN — ACETAMINOPHEN 650 MG: 325 TABLET, FILM COATED ORAL at 16:00

## 2019-12-15 RX ADMIN — QUETIAPINE FUMARATE 100 MG: 100 TABLET ORAL at 21:05

## 2019-12-15 RX ADMIN — TIZANIDINE 4 MG: 4 TABLET ORAL at 21:06

## 2019-12-15 RX ADMIN — NICOTINE POLACRILEX 6 MG: 4 GUM, CHEWING ORAL at 13:12

## 2019-12-15 RX ADMIN — TIZANIDINE 4 MG: 4 TABLET ORAL at 09:43

## 2019-12-15 RX ADMIN — GABAPENTIN 800 MG: 400 CAPSULE ORAL at 09:38

## 2019-12-15 RX ADMIN — NICOTINE POLACRILEX 6 MG: 4 GUM, CHEWING ORAL at 21:05

## 2019-12-15 RX ADMIN — NICOTINE POLACRILEX 4 MG: 4 GUM, CHEWING ORAL at 20:29

## 2019-12-15 RX ADMIN — NICOTINE POLACRILEX 6 MG: 4 GUM, CHEWING ORAL at 14:08

## 2019-12-15 RX ADMIN — LITHIUM CARBONATE 900 MG: 450 TABLET, EXTENDED RELEASE ORAL at 21:05

## 2019-12-15 RX ADMIN — GABAPENTIN 800 MG: 400 CAPSULE ORAL at 14:08

## 2019-12-15 RX ADMIN — PRAZOSIN HYDROCHLORIDE 2 MG: 2 CAPSULE ORAL at 21:05

## 2019-12-15 RX ADMIN — MIRTAZAPINE 30 MG: 30 TABLET, FILM COATED ORAL at 21:06

## 2019-12-15 RX ADMIN — FLUPHENAZINE HYDROCHLORIDE 1 MG: 1 TABLET, FILM COATED ORAL at 21:05

## 2019-12-15 RX ADMIN — LITHIUM CARBONATE 300 MG: 300 TABLET, EXTENDED RELEASE ORAL at 09:39

## 2019-12-15 RX ADMIN — NICOTINE POLACRILEX 4 MG: 4 GUM, CHEWING ORAL at 09:39

## 2019-12-15 RX ADMIN — NICOTINE 1 PATCH: 14 PATCH, EXTENDED RELEASE TRANSDERMAL at 09:39

## 2019-12-15 RX ADMIN — Medication 1 MG: at 09:39

## 2019-12-15 RX ADMIN — FEXOFENADINE HCL 60 MG: 60 TABLET, FILM COATED ORAL at 09:39

## 2019-12-15 RX ADMIN — BUSPIRONE HYDROCHLORIDE 10 MG: 10 TABLET ORAL at 21:05

## 2019-12-15 ASSESSMENT — ACTIVITIES OF DAILY LIVING (ADL)
LAUNDRY: WITH SUPERVISION
ORAL_HYGIENE: INDEPENDENT
HYGIENE/GROOMING: INDEPENDENT
DRESS: INDEPENDENT

## 2019-12-15 NOTE — PROGRESS NOTES
"The pt slept late and missed breakfast. She was out and more visible in the milieu today. She watched movies with other patients in the milieu, but isn't social with any of them. She reports \"being ready to leave\" on Tuesday. She has made plans to see her family after discharge. She would like this writer to report that \"she is awesome\" and denies any SI/SIB urges and hallucinations. The pt is well groomed.     12/15/19 1419   Behavioral Health   Hallucinations denies / not responding to hallucinations   Thinking intact   Orientation person: oriented;place: oriented;date: oriented;time: oriented   Memory baseline memory   Insight admits / accepts   Judgement intact   Eye Contact at examiner   Affect full range affect   Mood mood is calm   Physical Appearance/Attire attire appropriate to age and situation   Hygiene well groomed   Suicidality other (see comments)  (pt denies)   1. Wish to be Dead (Recent) No   2. Non-Specific Active Suicidal Thoughts (Recent) No   Self Injury other (see comment)  (pt denies)   Elopement   (nothing to report)   Activity other (see comment)  (visible in the milieu)   Speech clear;coherent   Medication Sensitivity no observed side effects;no stated side effects   Psychomotor / Gait balanced;steady     "

## 2019-12-15 NOTE — PROGRESS NOTES
12/14/19 2200   Art Therapy   Type of Intervention structured groups   Response participates with encouragement   Hours 1   Treatment Detail    Art Therapy- Intention setting   Goal- cope, express, regulate and reflect through Art Therapy directives     Outcome- Large group that were positive, engaged  and creative. They discussed intention and positive changes they would like to make in the new year.  Pt worked on a project she was working on previously on Wednesday, she was making a paper snow globe with a miniature detailed winter scene.   She is pleased for discharge next week to IR. She would like to meet with writer tomorrow 1:1 Art Therapy. Writer will come visit with her in the evening.

## 2019-12-15 NOTE — PROGRESS NOTES
12/14/19 8790   Significant Event   Significant Event Other (see comments)  (shift summary)     Pt was visible in the milieu, usually sitting in the hallway, withdrawn from peers, ate dinner, independent with ADLS, participated and attended evening group.  Flat/blunted affect, but appeared a litter brighter, calm, cooperative, anxious.  No stated SI, SIB or hallucinations..

## 2019-12-16 VITALS
SYSTOLIC BLOOD PRESSURE: 106 MMHG | BODY MASS INDEX: 37.24 KG/M2 | HEIGHT: 65 IN | RESPIRATION RATE: 16 BRPM | WEIGHT: 223.5 LBS | OXYGEN SATURATION: 99 % | TEMPERATURE: 98 F | DIASTOLIC BLOOD PRESSURE: 73 MMHG | HEART RATE: 75 BPM

## 2019-12-16 PROCEDURE — 99232 SBSQ HOSP IP/OBS MODERATE 35: CPT | Mod: GC | Performed by: PSYCHIATRY & NEUROLOGY

## 2019-12-16 PROCEDURE — G0177 OPPS/PHP; TRAIN & EDUC SERV: HCPCS

## 2019-12-16 PROCEDURE — 25000132 ZZH RX MED GY IP 250 OP 250 PS 637: Performed by: STUDENT IN AN ORGANIZED HEALTH CARE EDUCATION/TRAINING PROGRAM

## 2019-12-16 PROCEDURE — 25000132 ZZH RX MED GY IP 250 OP 250 PS 637: Performed by: PSYCHIATRY & NEUROLOGY

## 2019-12-16 PROCEDURE — 12400001 ZZH R&B MH UMMC

## 2019-12-16 RX ADMIN — LITHIUM CARBONATE 900 MG: 450 TABLET, EXTENDED RELEASE ORAL at 21:12

## 2019-12-16 RX ADMIN — MIRTAZAPINE 30 MG: 30 TABLET, FILM COATED ORAL at 21:12

## 2019-12-16 RX ADMIN — BUSPIRONE HYDROCHLORIDE 10 MG: 10 TABLET ORAL at 21:13

## 2019-12-16 RX ADMIN — TIZANIDINE 4 MG: 4 TABLET ORAL at 22:38

## 2019-12-16 RX ADMIN — GABAPENTIN 800 MG: 400 CAPSULE ORAL at 09:19

## 2019-12-16 RX ADMIN — ACETAMINOPHEN 650 MG: 325 TABLET, FILM COATED ORAL at 11:52

## 2019-12-16 RX ADMIN — NICOTINE POLACRILEX 6 MG: 4 GUM, CHEWING ORAL at 18:58

## 2019-12-16 RX ADMIN — BUSPIRONE HYDROCHLORIDE 10 MG: 10 TABLET ORAL at 13:58

## 2019-12-16 RX ADMIN — ACETAMINOPHEN 650 MG: 325 TABLET, FILM COATED ORAL at 09:19

## 2019-12-16 RX ADMIN — PRAZOSIN HYDROCHLORIDE 2 MG: 2 CAPSULE ORAL at 21:11

## 2019-12-16 RX ADMIN — QUETIAPINE FUMARATE 100 MG: 100 TABLET ORAL at 21:11

## 2019-12-16 RX ADMIN — ACETAMINOPHEN 650 MG: 325 TABLET, FILM COATED ORAL at 16:39

## 2019-12-16 RX ADMIN — TIZANIDINE 4 MG: 4 TABLET ORAL at 16:39

## 2019-12-16 RX ADMIN — QUETIAPINE FUMARATE 50 MG: 50 TABLET ORAL at 18:28

## 2019-12-16 RX ADMIN — NICOTINE POLACRILEX 4 MG: 4 GUM, CHEWING ORAL at 11:52

## 2019-12-16 RX ADMIN — BUSPIRONE HYDROCHLORIDE 10 MG: 10 TABLET ORAL at 09:20

## 2019-12-16 RX ADMIN — ACETAMINOPHEN 650 MG: 325 TABLET, FILM COATED ORAL at 21:13

## 2019-12-16 RX ADMIN — FEXOFENADINE HCL 60 MG: 60 TABLET, FILM COATED ORAL at 09:19

## 2019-12-16 RX ADMIN — NICOTINE POLACRILEX 4 MG: 4 GUM, CHEWING ORAL at 10:49

## 2019-12-16 RX ADMIN — Medication 1 MG: at 13:57

## 2019-12-16 RX ADMIN — FLUPHENAZINE HYDROCHLORIDE 1 MG: 1 TABLET, FILM COATED ORAL at 09:19

## 2019-12-16 RX ADMIN — NICOTINE POLACRILEX 6 MG: 4 GUM, CHEWING ORAL at 16:39

## 2019-12-16 RX ADMIN — NICOTINE POLACRILEX 6 MG: 4 GUM, CHEWING ORAL at 17:34

## 2019-12-16 RX ADMIN — FLUPHENAZINE HYDROCHLORIDE 1 MG: 1 TABLET, FILM COATED ORAL at 21:12

## 2019-12-16 RX ADMIN — GABAPENTIN 800 MG: 400 CAPSULE ORAL at 13:58

## 2019-12-16 RX ADMIN — LITHIUM CARBONATE 300 MG: 300 TABLET, EXTENDED RELEASE ORAL at 09:20

## 2019-12-16 RX ADMIN — NICOTINE POLACRILEX 6 MG: 4 GUM, CHEWING ORAL at 22:15

## 2019-12-16 RX ADMIN — TIZANIDINE 4 MG: 4 TABLET ORAL at 10:49

## 2019-12-16 RX ADMIN — Medication 1 MG: at 09:19

## 2019-12-16 RX ADMIN — NICOTINE 1 PATCH: 14 PATCH, EXTENDED RELEASE TRANSDERMAL at 09:20

## 2019-12-16 RX ADMIN — GABAPENTIN 800 MG: 400 CAPSULE ORAL at 21:12

## 2019-12-16 ASSESSMENT — ACTIVITIES OF DAILY LIVING (ADL)
HYGIENE/GROOMING: INDEPENDENT
ORAL_HYGIENE: INDEPENDENT
DRESS: INDEPENDENT
LAUNDRY: UNABLE TO COMPLETE

## 2019-12-16 NOTE — PLAN OF CARE
Problem: OT General Care Plan  Goal: OT Goal 1  Description  Pt will practice using >2 coping strategies to manage stress and reduce symptoms to demonstrate increased readiness for discharge.     Attended 2/3 occupational therapy groups offered this date. Overall congruent-bright affect and full engagement.      Wellness and coping skill based game: Movement [employer]. Education was provided on the sensory system, mind-body connection, and the use of movement and sensory strategies for self regulation. Pt Response: I to participate.    Occupational therapy clinic to explore novel arts/crafts and ways to distract self. Pt Response: Demonstrated consistent performance skills as observed on previous dates. With a bright affect, reported appreciation for the time to creatively push herself outside of her comfort zone     Outcome: Improving

## 2019-12-16 NOTE — PROGRESS NOTES
"   12/15/19 2000   Group Therapy Session   Group Attendance   (No charge)   Total Time (minutes) 10   Group Type psychotherapeutic   Ayana came to group then left after checking in. She stated she had done the activity before and did not want to do it again. During check in she reported feeling \"good\" and she presented in a pleasant mood.   "
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
## 2019-12-16 NOTE — PROGRESS NOTES
" 12/15/19 2200   Art Therapy   Type of Intervention 1:1 Art Therapy   Response participates with encouragement/  redirection   Hours 1   Treatment Detail    Art Therapy- Intention setting/ goals-ornaments   Goal- cope, express, regulate and reflect through Art Therapy directives     Outcome- Pt  And writer had 1:1 Art Therapy. Pt made a \"snow globe\" ornament, one for her parents and one for writer to use as a sample. She enjoyed the process.  Mostly the conversation was about discharge, doing what is best for her health. Topics we discussed were not using marijauna ( she doesn't seem committed to abstaining). She is more accepting of the end of her marriage. We talked about the possibility of leaving her friendships in the hospital. She is in communication with two patients on station 22. Writer and she discussed the benefits of focusing on taking care of herself. She said family is supportive. We used the session as closure for our 1:1 and group meetings. She shared some songs she liked with writer  And writer was able to wish her well and a smooth discharge process and stay at the [facility].             "

## 2019-12-16 NOTE — PROGRESS NOTES
"The pt was visible earlier than usual. She is excited to be leaving tomorrow. She does still report auditory hallucination, but reports this is her baseline. The pt reports heightened anxiety around her (soon to be) ex-wife. Pt denies SI/SIB urges. She is eating well, sleeping well and states she \"is ready to go.\"  The pt isn't social with others, but can be seen on the periphery.     12/16/19 1229   Behavioral Health   Hallucinations auditory   Thinking intact   Orientation person: oriented;place: oriented;date: oriented;time: oriented   Memory baseline memory   Insight poor   Judgement impaired   Eye Contact at examiner   Affect blunted, flat   Mood mood is calm   Physical Appearance/Attire attire appropriate to age and situation   Hygiene well groomed   Suicidality other (see comments)  (pt denies)   1. Wish to be Dead (Recent) No   2. Non-Specific Active Suicidal Thoughts (Recent) No   Self Injury other (see comment)  (pt denies)   Elopement   (nothing to report)   Activity other (see comment)  (visible at times)   Speech clear;coherent   Medication Sensitivity no stated side effects   Psychomotor / Gait balanced     "

## 2019-12-16 NOTE — PLAN OF CARE
"Pt had a calm and cooperative evening shift. She was appropriately social with peers and staff. She enjoyed working on crafts during her one to one therapy time which brightened her affect. Throughout the shift, pt appeared flat/blunted, but does converse positively. Pt denies any thoughts of hurting herself, wishing to be dead, SI, HI, anxiety, depression, and visual hallucinations. She does endorse auditory hallucinations, but states they were \"just mumbles\" at the time of interview. She explains they are not scary to her. She hears two voices. She explains they range from mumbles to telling her to kill herself. Pt states she is sleeping well, eating well, and denies any physical pain. Pt explains she is having regular bowel movements. She is looking forward to discharging on Tuesday to an [facility] and then hopes to be able to find an apartment when she leaves the [facility]. Pt had no concerns for writer this evening.  "

## 2019-12-16 NOTE — PROGRESS NOTES
"Pt participated in an extended individual psychotherapy session for termination of inpatient therapeutic services including verbal and dance/movement therapy interventions.  Pt was able to explore past patterns and tendency toward avoidant behaviors with support for continuing to invest in her own treatment after discharge from this hospitalization.      Pt explored \"lightness\" in movement from a developmental perspective that may be related to her use of humor and lightness in avoidance and past survival skills from trauma.  Pt was also given option for different tempos that may be more conducive to the longer-term outpatient treatment process and life goals.      Pt was jeremias to identify a purpose for this work as a motivating force to continue the tough, honest work she has begun during this hospitalization.         12/14/19 1130   Dance Movement Therapy   Type of Intervention 1:1 intervention   Response participates, initiates socially appropriate   Hours 1.5     "

## 2019-12-17 PROCEDURE — 25000132 ZZH RX MED GY IP 250 OP 250 PS 637: Performed by: STUDENT IN AN ORGANIZED HEALTH CARE EDUCATION/TRAINING PROGRAM

## 2019-12-17 PROCEDURE — 99238 HOSP IP/OBS DSCHRG MGMT 30/<: CPT | Mod: GC | Performed by: PSYCHIATRY & NEUROLOGY

## 2019-12-17 RX ORDER — LITHIUM CARBONATE 300 MG/1
300 TABLET, FILM COATED, EXTENDED RELEASE ORAL EVERY MORNING
Qty: 30 TABLET | Refills: 0 | Status: SHIPPED | OUTPATIENT
Start: 2019-12-18 | End: 2020-01-17

## 2019-12-17 RX ORDER — QUETIAPINE FUMARATE 100 MG/1
100 TABLET, FILM COATED ORAL AT BEDTIME
Qty: 30 TABLET | Refills: 0 | Status: SHIPPED | OUTPATIENT
Start: 2019-12-17 | End: 2020-01-17

## 2019-12-17 RX ADMIN — BUSPIRONE HYDROCHLORIDE 10 MG: 10 TABLET ORAL at 09:20

## 2019-12-17 RX ADMIN — FEXOFENADINE HCL 60 MG: 60 TABLET, FILM COATED ORAL at 09:19

## 2019-12-17 RX ADMIN — NICOTINE POLACRILEX 4 MG: 4 GUM, CHEWING ORAL at 09:20

## 2019-12-17 RX ADMIN — FLUPHENAZINE HYDROCHLORIDE 1 MG: 1 TABLET, FILM COATED ORAL at 09:19

## 2019-12-17 RX ADMIN — GABAPENTIN 800 MG: 400 CAPSULE ORAL at 09:19

## 2019-12-17 RX ADMIN — Medication 1 MG: at 09:19

## 2019-12-17 RX ADMIN — LITHIUM CARBONATE 300 MG: 300 TABLET, EXTENDED RELEASE ORAL at 09:20

## 2019-12-17 RX ADMIN — ACETAMINOPHEN 650 MG: 325 TABLET, FILM COATED ORAL at 09:19

## 2019-12-17 ASSESSMENT — ACTIVITIES OF DAILY LIVING (ADL)
DRESS: STREET CLOTHES;INDEPENDENT
HYGIENE/GROOMING: INDEPENDENT
ORAL_HYGIENE: INDEPENDENT
LAUNDRY: WITH SUPERVISION
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
## 2019-12-17 NOTE — PROGRESS NOTES
Patient is being discharged to [facility] today.  She reports feeling good- denies thoughts of self harm and is looking forward to finally getting out of the hospital.   Provisional discharge is completed, signed and will be faxed to her Cty CM to sign, forward to court.  Patient has f/u appts in place including Primary Care and Psychiatry. She is scheduled for her next Prolixin shot in 12/24/19.  Patient will meet with her Cty  on 12/18/19 at [facility].  Patient will be sent this morning to [facility] via cab at 10:30am

## 2019-12-17 NOTE — PROGRESS NOTES
Subjective     Ayana Prasad is a 29 year old female who presents to clinic today for the following health issues:    [facility]     Hospital Follow-up Visit:    Hospital/Nursing Home/IP Rehab Facility: [facility]  Date of Admission: 10/22/2019  Date of Discharge: 12/17/2019  Reason(s) for Admission: Principal Diagnosis: # Borderline Personality Disorder            Problems taking medications regularly:  None       Medication changes since discharge: Medications were changed but patient is not sure what changed.        Problems adhering to non-medication therapy:  None    Summary of hospitalization:  [facility] discharge summary reviewed  Diagnostic Tests/Treatments reviewed.  Follow up needed: Continue to follow with mental health and psychiatry   Other Healthcare Providers Involved in Patient s Care:         Psychiatry  Update since discharge: stable.     Post Discharge Medication Reconciliation: discharge medications reconciled and changed, per note/orders (see AVS).  Plan of care communicated with patient and group home     Coding guidelines for this visit:  Type of Medical   Decision Making Face-to-Face Visit       within 7 Days of discharge Face-to-Face Visit        within 14 days of discharge   Moderate Complexity [id number]   High Complexity [id number]            Current Outpatient Medications   Medication Sig Dispense Refill     benztropine (COGENTIN) 1 MG tablet Take 1 tablet (1 mg) by mouth 3 times daily as needed for agitation 60 tablet 0     busPIRone (BUSPAR) 10 MG tablet Take 1 tablet (10 mg) by mouth 3 times daily 90 tablet 0     fluPHENAZine (PROLIXIN) 1 MG tablet Take 1 tablet (1 mg) by mouth 2 times daily 60 tablet 0     fluPHENAZine decanoate (PROLIXIN) 25 MG/ML injection Inject 1 mL (25 mg) into the muscle every 14 days for 6 doses 6 mL 0     gabapentin (NEURONTIN) 300 MG capsule Take 2 capsules (600 mg) by mouth 3 times daily 90 capsule 0     lithium (ESKALITH  CR/LITHOBID) 450 MG CR tablet Take 2 tablets (900 mg) by mouth every evening 60 tablet 0     lithium ER (LITHOBID/ESKALITH CR) 300 MG CR tablet Take 1 tablet (300 mg) by mouth 2 times daily 30 tablet 11     lithium ER (LITHOBID/ESKALITH CR) 300 MG CR tablet Take 1 tablet (300 mg) by mouth every morning 30 tablet 0     mirtazapine (REMERON) 30 MG tablet Take 1 tablet (30 mg) by mouth At Bedtime 30 tablet 0     prazosin (MINIPRESS) 2 MG capsule Take 1 capsule (2 mg) by mouth At Bedtime 30 capsule 0     QUEtiapine (SEROQUEL) 100 MG tablet Take 1 tablet (100 mg) by mouth 2 times daily 30 tablet 11     QUEtiapine (SEROQUEL) 100 MG tablet Take 1 tablet (100 mg) by mouth At Bedtime 30 tablet 0     traZODone (DESYREL) 50 MG tablet Take 1 tablet (50 mg) by mouth nightly as needed for sleep 30 tablet 0     Allergies   Allergen Reactions     Haldol [Haloperidol] Other (See Comments)     Makes patient very angry and anxious     Adhesive Tape Hives     Percocet [Oxycodone-Acetaminophen] Nausea and Vomiting     Prednisone Other (See Comments) and Hives     Suicidal ideation     Risperidone Other (See Comments)     Tramadol Hcl Nausea and Vomiting     Droperidol Anxiety     Seroquel [Quetiapine] Palpitations     Spent 2 weeks in the hospital due to having seroquel, caused palpitations and QT prolongation       Reviewed and updated as needed this visit by Provider       Here today for hospital follow-up.  Was inpatient.  Reports current medications are working well.  Does not like how they make her have tremors, caused weight gain.  But admits that the voices are much better than they had been.  Is currently living in a group home.  Is required to be there for at least 90 days after that is unsure of plan.  Was discharged yesterday.  Group home is currently in [place].  Reports that as part of her discharge agreement is not able to use any alcohol or illicit drugs.  Has thought about smoking marijuana but knows is not able to.   "Since was discharged yesterday group home does not have orders for a.m. dose of lithium which is 300 mg or p.m. dose of Seroquel which is 100 mg.  Currently not having any thoughts of harming self or others.  Currently, voices are controlled and not telling her to do harmful things.  Wife Sergio does want to split up.  Ayana reports that she is fully dealt with this while was in the hospital.  She admits will miss Sergio and Dari children but knows would be best for her to move on.  Ayana also admits that she feels like she was not meant for this world.  Feels like she was warm meant for a world in the future by about 100 years.         Objective    /84 (BP Location: Left arm, Patient Position: Sitting, Cuff Size: Adult Regular)   Pulse 107   Temp 98.2  F (36.8  C) (Tympanic)   Ht 1.651 m (5' 5\")   Wt 101.6 kg (224 lb)   SpO2 97%   BMI 37.28 kg/m    Body mass index is 37.28 kg/m .          Assessment & Plan     Review of Systems   Constitutional: Negative for chills, diaphoresis, fatigue and fever.   HENT: Negative for ear discharge, ear pain, hearing loss, rhinorrhea, sinus pressure and sore throat.    Eyes: Negative for discharge and itching.   Respiratory: Negative for cough, shortness of breath and wheezing.    Gastrointestinal: Negative for constipation, diarrhea and nausea.   Skin: Negative for rash.   Neurological: Negative for dizziness, light-headedness and headaches.   Psychiatric/Behavioral: Positive for hallucinations.       Physical Exam  Constitutional:       Appearance: She is well-developed.   Cardiovascular:      Rate and Rhythm: Normal rate and regular rhythm.   Pulmonary:      Effort: Pulmonary effort is normal.      Breath sounds: Normal breath sounds.   Musculoskeletal:        Back:    Skin:     General: Skin is warm.   Neurological:      Mental Status: She is alert.         1. Schizoaffective disorder, bipolar type (H)  Continue to follow with mental health.  Did place a call and " verified that group home does not currently have orders for lithium or for Seroquel.  Will refill lithium and Seroquel.  Form completed for mental health providers to be able to prescribe medications in the future  - lithium ER (LITHOBID/ESKALITH CR) 300 MG CR tablet; Take 1 tablet (300 mg) by mouth 2 times daily  Dispense: 30 tablet; Refill: 11  - QUEtiapine (SEROQUEL) 100 MG tablet; Take 1 tablet (100 mg) by mouth 2 times daily  Dispense: 30 tablet; Refill: 11    2.  Low back pain, unspecified back pain laterality, unspecified chronicity, unspecified whether sciatica present  - [facility]ERR[facility]; Future     During this visit greater than 90% of the 45 minutes for this appointment was spent in counseling and/or coordinating care of above stated issues.     No follow-ups on file.    BROOKLYN Cruz [facility]

## 2019-12-17 NOTE — PROGRESS NOTES
Pt discharged as per order.  Pt denied SI..  Reviewed discharge paperwork with pt. Pt pleasant and cooperative.Pt discharged with all belongings, discharge paperwork, and ordered discharge medications.  Pt discharged via cab to transfer house.

## 2019-12-17 NOTE — DISCHARGE INSTRUCTIONS
Behavioral Discharge Planning and Instructions    Summary:   You were admitted to [facility] on 10/22/19 with worsening depression/suicidal ideation  under the care of Dr. Morin and Dr Hills.  You met with the team daily for ongoing psychiatric assessment and medication management.  You had opportunities to participate in therapeutic groups on the unit.   Due to concerns for your safety, a petition for commitment was filed and supported through [facility]. You will be provisionally discharged from the hospital.  At this time you report your mood is stabilizing and you report you are not having thoughts or intent to harm yourself or others. You will be discharged to [facility] and will resume care with your outpatient providers. You have also been referred to the [facility] for Psychiatry follow up.    Disposition:  [facility]    Main Diagnosis:   Schizoaffective Disorder  PTSD  Opiate Use Disorder    Major Treatments, Procedures and Findings:   Medications were  managed throughout your stay. An internal medicine consult was completed during your stay. You had the opportunity to participate in treatment programming while on the unit including occupational therapy, mental health support and education and spiritual services.     Symptoms to Report:   Please report if you are experiencing increased aggression and/or confusion, problematic loss of sleep, worsening mood, or thoughts of suicide to your treatment team or notify your primary provider.   IF THE SYMPTOMS YOU ARE EXPERIENCING ARE A MEDICAL EMERGENCY, CALL 911 IMMEDIATELY    Lifestyle Adjustment:   1. Adjust your lifestyle to get enough sleep, relaxation, exercise and good nutrition.  Continue to develop healthy coping skills to decrease stress and promote a healthy and sober lifestyle.  2. Abstain from all substances of abuse.  3. Take medications as prescribed.  Please work with your doctor to discuss any concerns  you have with your medications or side effects you may be experiencing.  4. Follow up with appointments as scheduled.        Psychiatry Follow-up:   Appointment: Primary Care:  Becki Avery CNP:  12/18/19 at 1:00pm  [facility]: [address] , [place] [address]   [phone]    Appointment: : 12/19/19 at [facility].    Appointment: Prolixin Injection 12/24/19 at 11:00am  [facility]: [address], [address]   [place] [address]  [phone]    Appointment: Psychiatry: Dr. Hamilton (med check appt): 1/7/19 at 10:00am  [facility]: [address], [address]   [place] [address]  [phone]    Appointment: Psychiatry: Sonia Bolivar: 1/15/2020 at 8:45am  [facility]: [address], [address]   [place] [address]  [phone]    Appointment: Psychiatry: Ashlyn Fields: 1/22/19 at 9:30am  [facility]: [facility], [address]   [place] [address]  [phone]    [facility] : Emily Rodriguez: [phone]    Resources:   *[facility] Crisis: [phone]    24/7 crisis hotline for adults who are in the midst of a mental health crisis  *[facility]: [address] [place] [address]  [phone]    Walk-in services include access to an onsite team of psychiatrists, nurses, social workers and trained peer support staff that provide person-centered, recovery-focused care. Urgent Care for Adult Mental Health offers an alternative to visiting the emergency room during a mental health crisis.   Monday through Friday, 8 a.m. - 5:30 p.m.  Closed on Saturday, Sunday and holidays    General Medication Instructions:   See your medication sheet(s) for instructions.   Take all medicines as directed.  Make no changes unless your doctor suggests them.   Go to all your doctor visits.  Be sure to have all your required lab tests.  This way, your medicines can be refilled on time.  Do not use any drugs not prescribed by your doctor.    The treatment team has appreciated the opportunity to work with you.  We wish you the best in the future.    If you have any questions or concerns our unit number is [phone].

## 2019-12-18 ENCOUNTER — TELEPHONE (OUTPATIENT)
Dept: FAMILY MEDICINE | Facility: CLINIC | Age: 29
End: 2019-12-18

## 2019-12-18 ENCOUNTER — OFFICE VISIT (OUTPATIENT)
Dept: FAMILY MEDICINE | Facility: CLINIC | Age: 29
End: 2019-12-18
Payer: COMMERCIAL

## 2019-12-18 ENCOUNTER — MYC MEDICAL ADVICE (OUTPATIENT)
Dept: FAMILY MEDICINE | Facility: CLINIC | Age: 29
End: 2019-12-18

## 2019-12-18 VITALS
HEIGHT: 65 IN | WEIGHT: 224 LBS | BODY MASS INDEX: 37.32 KG/M2 | TEMPERATURE: 98.2 F | HEART RATE: 107 BPM | SYSTOLIC BLOOD PRESSURE: 124 MMHG | DIASTOLIC BLOOD PRESSURE: 84 MMHG | OXYGEN SATURATION: 97 %

## 2019-12-18 DIAGNOSIS — M54.50 LOW BACK PAIN, UNSPECIFIED BACK PAIN LATERALITY, UNSPECIFIED CHRONICITY, UNSPECIFIED WHETHER SCIATICA PRESENT: ICD-10-CM

## 2019-12-18 DIAGNOSIS — R45.851 SUICIDAL IDEATION: ICD-10-CM

## 2019-12-18 DIAGNOSIS — F25.0 SCHIZOAFFECTIVE DISORDER, BIPOLAR TYPE (H): Primary | ICD-10-CM

## 2019-12-18 PROBLEM — E66.01 MORBID OBESITY (H): Status: ACTIVE | Noted: 2019-12-18

## 2019-12-18 PROCEDURE — 99495 TRANSJ CARE MGMT MOD F2F 14D: CPT | Performed by: NURSE PRACTITIONER

## 2019-12-18 RX ORDER — QUETIAPINE FUMARATE 100 MG/1
100 TABLET, FILM COATED ORAL 2 TIMES DAILY
Qty: 30 TABLET | Refills: 11 | Status: SHIPPED | OUTPATIENT
Start: 2019-12-18 | End: 2020-01-17

## 2019-12-18 RX ORDER — FLUPHENAZINE DECANOATE 25 MG/ML
25 INJECTION, SOLUTION INTRAMUSCULAR; SUBCUTANEOUS
Qty: 6 ML | Refills: 0 | Status: SHIPPED | OUTPATIENT
Start: 2019-12-18 | End: 2020-02-12

## 2019-12-18 RX ORDER — LITHIUM CARBONATE 300 MG/1
300 TABLET, FILM COATED, EXTENDED RELEASE ORAL 2 TIMES DAILY
Qty: 30 TABLET | Refills: 11 | Status: SHIPPED | OUTPATIENT
Start: 2019-12-18 | End: 2020-01-17 | Stop reason: ALTCHOICE

## 2019-12-18 ASSESSMENT — ENCOUNTER SYMPTOMS
SHORTNESS OF BREATH: 0
FATIGUE: 0
FEVER: 0
DIARRHEA: 0
HALLUCINATIONS: 1
RHINORRHEA: 0
DIZZINESS: 0
SORE THROAT: 0
SINUS PRESSURE: 0
CONSTIPATION: 0
EYE DISCHARGE: 0
NAUSEA: 0
LIGHT-HEADEDNESS: 0
EYE ITCHING: 0
CHILLS: 0
COUGH: 0
WHEEZING: 0
DIAPHORESIS: 0
HEADACHES: 0

## 2019-12-18 ASSESSMENT — MIFFLIN-ST. JEOR: SCORE: 1741.94

## 2019-12-18 NOTE — TELEPHONE ENCOUNTER
[facility] called they wanted to confirm fill on Seroquel for pt due to allergy alert,  Consult Gena, she just saw pt, apparently she has been taking in hospital past 2 months and been monitored with EKG --no  Problems   Ok to fill this time   [facility] will be filling for pt   Pt notified 1 x time fill ok  And pharmacy informed   ANTHONY Evans  RN/[place]

## 2019-12-18 NOTE — LETTER
December 20, 2019      Ayana Prasad  [address] [place] [address]        To Whom It May Concern:    Ayana Prasad is a patient at our clinic.  Patient may continue to seroquel even though it is listed at a drug allergy.     Please let me know if you have any clarifying questions.     Sincerely,        Dr. Domingo

## 2019-12-19 ENCOUNTER — TELEPHONE (OUTPATIENT)
Dept: FAMILY MEDICINE | Facility: CLINIC | Age: 29
End: 2019-12-19

## 2019-12-19 NOTE — TELEPHONE ENCOUNTER
Spoke with nurse at [facility]  [phone]   Advised pt should have Rx on file at pharmacy for 2 different dosit of seroquel, one for anxiety the Gena wrote and one for sleep from hospital  They will have to address which one and how to give  With in house an psych  They had questions about the allergy alert assoc with med, I reviewed not form 12/18 with Gena and she ok usage of medication Seroquel    Me           12/18/19 3:36 PM   Note      [facility] pharmacy called they wanted to confirm fill on Seroquel for pt due to allergy alert,  Consult Gena, she just saw pt, apparently she has been taking in hospital past 2 months and been monitored with EKG --no  Problems   Ok to fill this time   [facility] for [facility]   Pt notified 1 x time fill ok  And pharmacy informed   ANTHONY Evans RN/[place]              They need letter faxed as such   Letter done and faxed today  They may call as need  ANTHONY Evans RN/[place]

## 2019-12-19 NOTE — TELEPHONE ENCOUNTER
Ayana  A nurse form the [facility] facility that ayana is staying at needs to talk to a nurse for Vinnie.  Pt did not sleep last night and basically is asking for seroquel  , it worked while she was in the hospital  And they need the order Sent to them and fax it to them at fax  # [phone], they also can take a verbal if you call them back.      ayana the pt , her partner is going to bring in the medication she already has, they just need the ok to give it.      ayana the nurse leaves at 4pm and then Alice is taking over.     Aubrie Lala, [place]

## 2019-12-19 NOTE — LETTER
[facility]  [address] [address]  Phone: [phone]    12/19/19    Ayana Prasad  [address] [place] [address]      To whom it may concern: [facility],        12/18/19 3:36 PM   Note      [facility] pharmacy called they wanted to confirm fill on Seroquel for pt due to allergy alert,  Consult Gena, she just saw pt, apparently she has been taking in hospital past 2 months and been monitored with EKG --no  Problems   Ok to fill this time   [facility] be [facility]   Pt notified 1 x time fill ok  And pharmacy informed   ANTHONY Evans  RN/[place]           Ok to give Seroquel per Becki Avery CNP     Sincerely,      Becki Avery, APRN CNP      Fax to [phone]

## 2019-12-20 NOTE — TELEPHONE ENCOUNTER
Please see mychart message.  Letter sent TANESHA pool.  Please print and fax accordingly.  Please let patient know as well.

## 2019-12-23 ENCOUNTER — TELEPHONE (OUTPATIENT)
Dept: FAMILY MEDICINE | Facility: CLINIC | Age: 29
End: 2019-12-23

## 2019-12-23 DIAGNOSIS — Z72.0 TOBACCO ABUSE: ICD-10-CM

## 2019-12-23 RX ORDER — NICOTINE 21 MG/24HR
1 PATCH, TRANSDERMAL 24 HOURS TRANSDERMAL DAILY
Qty: 30 PATCH | Refills: 1 | Status: SHIPPED | OUTPATIENT
Start: 2019-12-23 | End: 2020-01-17

## 2019-12-23 NOTE — TELEPHONE ENCOUNTER
Spoke with Alice at [facility], she needs copies of  Both RX for Ayana, they  do not have directions to give , states nothing  came over on pharmacy RX ??  I reviewed them with her and printed them and the letter giving permission for her to take it again,was pres faxed -re- faxed to them at [phone]  May callback as need   ANTHONY Evans  RN/[place]

## 2019-12-23 NOTE — TELEPHONE ENCOUNTER
Pt is calling and requesting nicotene patches , she states she was on them back in April and would like to start again, please advise.     Aubrie Lala, [place]

## 2019-12-26 ENCOUNTER — TELEPHONE (OUTPATIENT)
Dept: PSYCHIATRY | Facility: CLINIC | Age: 29
End: 2019-12-26

## 2019-12-26 ENCOUNTER — ALLIED HEALTH/NURSE VISIT (OUTPATIENT)
Dept: PSYCHIATRY | Facility: CLINIC | Age: 29
End: 2019-12-26
Payer: COMMERCIAL

## 2019-12-26 DIAGNOSIS — F20.9 CHRONIC SCHIZOPHRENIA (H): Primary | ICD-10-CM

## 2019-12-26 PROCEDURE — 25000128 H RX IP 250 OP 636: Mod: ZF | Performed by: NURSE PRACTITIONER

## 2019-12-26 PROCEDURE — 96372 THER/PROPH/DIAG INJ SC/IM: CPT | Mod: ZF

## 2019-12-26 RX ORDER — FLUPHENAZINE DECANOATE 25 MG/ML
25 INJECTION, SOLUTION INTRAMUSCULAR; SUBCUTANEOUS ONCE
Status: COMPLETED | OUTPATIENT
Start: 2019-12-26 | End: 2020-01-07

## 2019-12-26 RX ADMIN — FLUPHENAZINE DECANOATE 25 MG: 25 INJECTION, SOLUTION INTRAMUSCULAR; SUBCUTANEOUS at 14:30

## 2019-12-26 NOTE — TELEPHONE ENCOUNTER
[facility] - Ok for 25 mg Fluphenazine Dec, IM Injection, Q14d, per Ashlyn Fields.Jaelyn Mascorro, CLAUDEN

## 2019-12-26 NOTE — NURSING NOTE
Clinic Administered Medication Documentation      Injectable Medication Documentation    Patient was given Fluphenazine 25 mg. Prior to medication administration, verified patients identity using patient s name and date of birth. Please see MAR and medication order for additional information. Patient instructed to remain in clinic for 15 minutes and report any adverse reaction to staff immediately .      Was entire vial of medication used? No, The remainder 100MG of 125MG was discarded as unavoidable waste.  Vial/Syringe: Single dose vial  Expiration Date:    Was this medication supplied by the patient? No      Ayana Prasad,  1990, presented to the clinic today at the request of Ashlyn Fields,  ordering provider for long-acting injectable Fluphenazine 25 mg.    OBSERVATIONS:  1.  Appearance: Casually dressed in jeans, t shirt, and winter jacket. Pt asked writer to check on her Seroquel prescription sig and needed to make changes to it. Writer called Colleen TOM RN to injection room to address this issue. Pt thanked Colleen and writer for the effort  2.  Mood: Fair  3.  Affect: Congruent  4.  Attitude: Fair  5.  Cooperation: Full  6.  Side Effects: Denied  7.  Education: Ice at injection site for pain  8. Next appointment: 2020 1000  9. Location: Right Deltoid    The service provided today was rendered under the supervising provider of the day, Dr. Marinelli, who was available for consultation as needed.

## 2019-12-30 ENCOUNTER — MYC MEDICAL ADVICE (OUTPATIENT)
Dept: FAMILY MEDICINE | Facility: CLINIC | Age: 29
End: 2019-12-30

## 2019-12-30 DIAGNOSIS — Z72.0 TOBACCO ABUSE: Primary | ICD-10-CM

## 2020-01-07 ENCOUNTER — ALLIED HEALTH/NURSE VISIT (OUTPATIENT)
Dept: PSYCHIATRY | Facility: CLINIC | Age: 30
End: 2020-01-07
Attending: PSYCHIATRY & NEUROLOGY
Payer: COMMERCIAL

## 2020-01-07 ENCOUNTER — TELEPHONE (OUTPATIENT)
Dept: PSYCHIATRY | Facility: CLINIC | Age: 30
End: 2020-01-07

## 2020-01-07 VITALS
DIASTOLIC BLOOD PRESSURE: 84 MMHG | WEIGHT: 218.4 LBS | HEART RATE: 121 BPM | SYSTOLIC BLOOD PRESSURE: 123 MMHG | BODY MASS INDEX: 36.34 KG/M2

## 2020-01-07 DIAGNOSIS — F25.0 SCHIZOAFFECTIVE DISORDER, BIPOLAR TYPE (H): Primary | ICD-10-CM

## 2020-01-07 DIAGNOSIS — F31.11 BIPOLAR 1 DISORDER, MANIC, MILD (H): ICD-10-CM

## 2020-01-07 PROCEDURE — 96372 THER/PROPH/DIAG INJ SC/IM: CPT | Mod: ZF

## 2020-01-07 PROCEDURE — 25000128 H RX IP 250 OP 636: Mod: ZF | Performed by: NURSE PRACTITIONER

## 2020-01-07 PROCEDURE — G0463 HOSPITAL OUTPT CLINIC VISIT: HCPCS | Mod: ZF

## 2020-01-07 RX ADMIN — FLUPHENAZINE DECANOATE 25 MG: 25 INJECTION, SOLUTION INTRAMUSCULAR; SUBCUTANEOUS at 10:30

## 2020-01-07 ASSESSMENT — PAIN SCALES - GENERAL: PAINLEVEL: NO PAIN (0)

## 2020-01-07 NOTE — PROGRESS NOTES
Outpatient Psychiatry Progress Note       Interim History     Ayana Prasad is a 29 year old year old female with  Borderline personality disorder, Schizoaffective disorder bipolar type, ADHD, Substance use disorder who presents for ongoing psychiatric care. She was hospitalized for two months starting in OCT 22, and diacharged on 12/17/19.   Ayana Prasad was last seen in clinic by Jaelyn Mascorro for IM shot of fluphenazine on 12/26.  At that time, she was stable.     Today,   The patient denies side effects from medications, No change in medical status and No change in substance abuse status. Patient is currently at an IRTS facility since her discharge. Hopes to return to the community to live by herself. She has been  from her spouse Sergio during the hospitalization. Ayana notes she was expecting it but hopes to deal with it in therapy. She has a therapy appt pending next week.   Ayana wanted a few things about her meds clarified with the group home. I promised to check in and fax the latest orders to the group home.  She wants the fluphenazine po med to be dc'ed. The seroquel 100 mg bid tab to be changed to prn, Nicorette gum to be continued  Nicotine patch to be continued at 21 mg daily as she is not yet ready to quit smoking.    I spoke with the IRTS staff and noted that patient has been getting prescriptions from different providers. They have received seroquel 100 mg po bid refill from Becki Avery along with nicorette gum and Becki refused to refill nicotine patch.  Ayana already has nicotine patch for 21 mg q 24 hr refilled by another provider angela Greenwood. Dr. Diez apparently sent  Refill for  olanzapine 10 mg po bid prn for agitation.     The IRTS facility is confused.    I sent the following orders.  1. Nicotine 21 mg q 24 hr   2. Continue seroquel 100 mg at bed time.   3. Change seroquel 100 mg po bid to prn -(Patient has documented allergy to seroquel)  4. Continue proloxin 1 mg tab  po bid as  Per her discharge orders.  5. F/u with Ashlyn Patti  6. Reconfirm medication list with Becki Avery.        Target Symptoms to address today include:  Anxiety - Ongoing anxiety worsened by her anticipated meeting with her new CM in the community.    PULSE (128) and BP were high will be rechecked.Ayana denies any dizziness or CP or panic attacks.    Other interim history includes:She notes that she has been stable at the [facility], has been sober, is under civil commitment until May 2020.     Current Substance Use:  None    Past Medical History:   Past Medical History:   Diagnosis Date     ADHD (attention deficit hyperactivity disorder)      Bipolar 1 disorder      Borderline personality disorder      Cauda equina syndrome      Chronic low back pain      Depression      Depressive disorder      GERD (gastroesophageal reflux disease)      h/o TBI (traumatic brain injury)      Marginal corneal ulcer, left 7/17/2015     Nephrolithiasis      Polysubstance abuse - methamphetamine, hallucinagen, heroin, marijuana     currently in remission     PONV (postoperative nausea and vomiting)      PTSD (post-traumatic stress disorder)        Allergies:   Allergies   Allergen Reactions     Haldol [Haloperidol] Other (See Comments)     Makes patient very angry and anxious     Adhesive Tape Hives     Percocet [Oxycodone-Acetaminophen] Nausea and Vomiting     Prednisone Other (See Comments) and Hives     Suicidal ideation     Risperidone Other (See Comments)     Tramadol Hcl Nausea and Vomiting     Droperidol Anxiety     Seroquel [Quetiapine] Palpitations     Spent 2 weeks in the hospital due to having seroquel, caused palpitations and QT prolongation          Review of Systems:  Negative through Constitutional, Neuro, GI, and , except as noted in HPI    Current Medications     Current Outpatient Medications   Medication Sig Dispense Refill     benztropine (COGENTIN) 1 MG tablet Take 1 tablet (1 mg) by mouth 3 times daily as  "needed for agitation 60 tablet 0     busPIRone (BUSPAR) 10 MG tablet Take 1 tablet (10 mg) by mouth 3 times daily 90 tablet 0     fluPHENAZine (PROLIXIN) 1 MG tablet Take 1 tablet (1 mg) by mouth 2 times daily 60 tablet 0     fluPHENAZine decanoate (PROLIXIN) 25 MG/ML injection Inject 1 mL (25 mg) into the muscle every 14 days 6 mL 0     gabapentin (NEURONTIN) 300 MG capsule Take 2 capsules (600 mg) by mouth 3 times daily 90 capsule 0     lithium (ESKALITH CR/LITHOBID) 450 MG CR tablet Take 2 tablets (900 mg) by mouth every evening 60 tablet 0     lithium ER (LITHOBID/ESKALITH CR) 300 MG CR tablet Take 1 tablet (300 mg) by mouth 2 times daily 30 tablet 11     lithium ER (LITHOBID/ESKALITH CR) 300 MG CR tablet Take 1 tablet (300 mg) by mouth every morning 30 tablet 0     mirtazapine (REMERON) 30 MG tablet Take 1 tablet (30 mg) by mouth At Bedtime 30 tablet 0     nicotine (NICODERM CQ) 21 MG/24HR 24 hr patch Place 1 patch onto the skin daily 30 patch 1     nicotine (NICORETTE) 2 MG gum Place 1 each (2 mg) inside cheek as needed for smoking cessation 100 each 1     prazosin (MINIPRESS) 2 MG capsule Take 1 capsule (2 mg) by mouth At Bedtime 30 capsule 0     QUEtiapine (SEROQUEL) 100 MG tablet Take 1 tablet (100 mg) by mouth 2 times daily 30 tablet 11     QUEtiapine (SEROQUEL) 100 MG tablet Take 1 tablet (100 mg) by mouth At Bedtime 30 tablet 0     traZODone (DESYREL) 50 MG tablet Take 1 tablet (50 mg) by mouth nightly as needed for sleep 30 tablet 0         Mental Status Exam     Appearance:  Casually dressed, Adequately groomed, Dressed appropriately for weather, Appears stated age and was shaking her left leg throughout the meeting.  Behavior/relationship to examiner/demeanor:  Cooperative  Orientation: Oriented to person, place, time and situation  Speech Rate:  Normal  Speech Spontaneity:  Normal  Mood:  \"fine\"  Affect:  Subdued and Restricted  Thought Process (Associations):  Logical  Thought Content:  no overt " psychosis, denies suicidal ideation, intent or thoughts, patient denies auditory hallucinations, No violent ideation and No homicidal ideation  Abnormal Perception:  None  Attention/Concentration:  Able to pay attention during the visit but endorsed trouble concentrating on PHQ-9 for a score of 3.  Language:  Intact  Insight:  Fair  Judgment:  Fair    Motor activity/EPS:  leg shaking  Gait:  Normal  Sensorium:  Alert    Results     Vital signs:   Blood pressure (!) 141/96, pulse 128, weight 99.1 kg (218 lb 6.4 oz), not currently breastfeeding.  Body mass index is 36.34 kg/m .       Laboratory Data:      Lab Results Latest Ref Rng & Units 6/28/2019 6/29/2019 10/23/2019 11/7/2019    - 144 mmol/L 136 138 140 139   POTASSIUM 3.4 - 5.3 mmol/L 4.3 3.7 3.9 3.9   CHLORIDE 94 - 109 mmol/L 105 110(H) 112(H) 109   CARBON DIOXIDE 20 - 32 mmol/L 22 21 23 22   ANION GAP 3 - 14 mmol/L 9 7 5 8   GLUCOSE 70 - 99 mg/dL 98 104(H) 99 104(H)   BUN 7 - 30 mg/dL 14 18 13 18   CREATININE 0.52 - 1.04 mg/dL 0.79 0.91 0.87 0.58   CALCIUM 8.5 - 10.1 mg/dL 10.6(H) 9.4 9.2 9.5   PROTEIN, TOTAL 6.8 - 8.8 g/dL - - 8.0 7.8   ALBUMIN 3.4 - 5.0 g/dL - - 4.0 3.8   BILIRUBIN TOTAL 0.2 - 1.3 mg/dL - - 0.5 0.3   ALKPHOS 40 - 150 U/L - - 52 49   AST 0 - 45 U/L - - 12 15   ALT 0 - 50 U/L - - 24 36   CHOLESTEROL <200 mg/dL - - 230(H) -   TRIGLYCERIDES <150 mg/dL - - 211(H) -   HDL CHOLESTEROL >49 mg/dL - - 58 -   LDL CHOLESTEROL, CALCULATED <100 mg/dL - - 130(H) -   NON HDL CHOLESTEROL <130 mg/dL - - 172(H) -   HEMOGLOBIN A1C 0 - 5.6 % - - - -   WBC 4.0 - 11.0 10e9/L 10.6 - 10.9 -   RBC 3.8 - 5.2 10e12/L 5.03 - 5.15 -   HGB 11.7 - 15.7 g/dL 14.0 - 14.3 -   HCT 35.0 - 47.0 % 44.1 - 43.6 -   MCV 78 - 100 fl 88 - 85 -   MCH 26.5 - 33.0 pg 27.8 - 27.8 -   MCHC 31.5 - 36.5 g/dL 31.7 - 32.8 -   RDW 10.0 - 15.0 % 13.9 - 13.3 -   PLATELET COUNT - QUEST 150 - 450 10:9/L - - - -   PROLACTIN 3 - 27 ug/L - - - -       Assessment & Plan      Ayana Prasad is a 29  year old year old female with borderline personality disorder, Schizoaffective disorder Bipolar type here for a bridge appointment until she sees her provider Ashlyn Armstrong for future follow up. Ayana was in the hospital recently from OCT to DEC,2019. Her meds have been increased. She is on a civil commitment. Ayana has multiple stressors with school, relationship, ongoing anxiety, living situation. She is currently stable and safe at her IRTS facility. Is under civil commitment until May 2020. Her sobriety is going well given it is a stipulation of her stay at [facility] and also under the commitment. She has had prior CD treatments. Previously Ayana has had ongoing SI with command hallucinations which seem to have resolved with current med adjustments. Ayana is struggling with her multiple medications and wants to minimize them which may be something that needs to be addressed with her provider.    Diagnosis  Schizoaffective disorder -bipolar type currently in remission  Borderline personality disorder  ADHD  Substance use disorder        Plan:  Medication:  Continue current medications. Will clarify prn meds with group home.  RTC: pending with Ashlyn Armstrong.  Labs/Monitoring: none  Psychotherapy: Appt pending here  Psychological testing: none needed  :  Under civil commitment.     The risks, benefits, alternatives and side effects have been discussed and are understood by the patient. The patient understands the risks of using street drugs or alcohol. There are no medical contraindications, the patient agrees to treatment, and has the capacity to do so. The patient understands to call 911 or come to the nearest ED if life threatening or urgent symptoms present.     Total time: 40 min  Counseling/Coordination of care: 22 min    Counseling/Coordination included: face to face conversation with the patient regarding meds ,ensuring symptom control and safety.and also contact with the IRTS for med recommendations.

## 2020-01-07 NOTE — NURSING NOTE
Clinic Administered Medication Documentation      Injectable Medication Documentation    Patient was given Fluphenazine Dec 25 mg. Prior to medication administration, verified patients identity using patient s name and date of birth. Please see MAR and medication order for additional information. Patient instructed to remain in clinic for 15 minutes and report any adverse reaction to staff immediately .      Was entire vial of medication used? No, The remainder 100MG of 125MG was discarded as unavoidable waste.  Vial/Syringe: Single dose vial  Expiration Date:    Was this medication supplied by the patient? No      Ayana Prasad,  1990, presented to the clinic today at the request of Dr Hamilton,  ordering provider for long-acting injectable Fluphenazine Dec 25 mg.    OBSERVATIONS:  1.  Appearance: Casually dressed in jeans, long sleeve shirt, winter jacket, and ball cap. Dr Hamilton asked writer to obtain a new BP - 123/84 & P - 121.  2.  Mood: Fair, pt was more subdued than prior appt  3.  Affect: Flat  4.  Attitude: Fair  5.  Cooperation: Full  6.  Side Effects: Denied  7.  Education: Ice at injection site for pain  8. Next appointment: 2020 0930 with Ashlyn Fields with injection at 1030  9. Location: Left Deltoid    The service provided today was rendered under the supervising provider of the day, Dr. Bailey, who was available for consultation as needed.

## 2020-01-07 NOTE — TELEPHONE ENCOUNTER
Left message on answering machine for Lalo Armstrong to call back.    Alice left a message with  for nurse to call her back to discuss patients medication.    Sherley Nava RN

## 2020-01-07 NOTE — TELEPHONE ENCOUNTER
[facility] - Continue with Fluphenazine 25 mg, IM Injection, q14d, per Dr. Hamilton.Jaelyn Mascorro LPN

## 2020-01-10 RX ORDER — GABAPENTIN 300 MG/1
600 CAPSULE ORAL 3 TIMES DAILY
Qty: 90 CAPSULE | Refills: 0 | Status: SHIPPED | OUTPATIENT
Start: 2020-01-10 | End: 2020-01-17

## 2020-01-10 NOTE — TELEPHONE ENCOUNTER
"Routing refill request to provider for review/approval because:  Drug not on the [facility] refill protocol   Last ordered by: Wilfredo Hills MD    Received call from Ayana Leblanc RN at [facility] [phone].   \"She's about to run out of her gabapentin.\" She takes 600mg po TID.    The pharmacy is:    [facility]   [address], [place] [address]  (p) [phone]  (f) [phone]    Alida NICK RN          "

## 2020-01-14 ENCOUNTER — TELEPHONE (OUTPATIENT)
Dept: FAMILY MEDICINE | Facility: CLINIC | Age: 30
End: 2020-01-14

## 2020-01-14 NOTE — TELEPHONE ENCOUNTER
Alice-Care Coordinator from [facility] called regarding patients medication list. Requesting Becki Avery to review patients medication list at time of office visit(refill medications and discontinue medication patient no longer is taking). Please send updated medication list with patient to give to Alice for her records.  Alice would like clarification on use of the nicorette gum and the patches, reports patient using both at the same time. Reports she is also smoking  Alice's number if questions [phone]  Scheduled appointment on 1/17/20 at 2:00 for Follow-up    Sherley Nava RN

## 2020-01-15 ENCOUNTER — OFFICE VISIT (OUTPATIENT)
Dept: PSYCHIATRY | Facility: CLINIC | Age: 30
End: 2020-01-15
Attending: SOCIAL WORKER
Payer: COMMERCIAL

## 2020-01-15 DIAGNOSIS — F41.1 GENERALIZED ANXIETY DISORDER: ICD-10-CM

## 2020-01-15 DIAGNOSIS — F60.3 BORDERLINE PERSONALITY DISORDER (H): ICD-10-CM

## 2020-01-15 DIAGNOSIS — F25.0 SCHIZOAFFECTIVE DISORDER, BIPOLAR TYPE (H): Primary | ICD-10-CM

## 2020-01-15 NOTE — Clinical Note
Hi,For your assessment (I think she sees you tomorrow?). It's not complete, but does have a lot of her social history in there. Of note, she isn't the most reliable historian. She told me no to limited drug use in over a year, but last inpatient visit notes recent relapse of multiple substances.Alexandra

## 2020-01-17 ENCOUNTER — VIRTUAL VISIT (OUTPATIENT)
Dept: FAMILY MEDICINE | Facility: CLINIC | Age: 30
End: 2020-01-17
Payer: COMMERCIAL

## 2020-01-17 DIAGNOSIS — M54.50 CHRONIC BILATERAL LOW BACK PAIN WITHOUT SCIATICA: Primary | ICD-10-CM

## 2020-01-17 DIAGNOSIS — F25.0 SCHIZOAFFECTIVE DISORDER, BIPOLAR TYPE (H): ICD-10-CM

## 2020-01-17 DIAGNOSIS — G89.29 CHRONIC BILATERAL LOW BACK PAIN WITHOUT SCIATICA: Primary | ICD-10-CM

## 2020-01-17 DIAGNOSIS — N92.0 MENORRHAGIA WITH REGULAR CYCLE: ICD-10-CM

## 2020-01-17 DIAGNOSIS — F31.11 BIPOLAR 1 DISORDER, MANIC, MILD (H): ICD-10-CM

## 2020-01-17 PROBLEM — Z71.89 ACP (ADVANCE CARE PLANNING): Status: RESOLVED | Noted: 2017-02-03 | Resolved: 2020-01-17

## 2020-01-17 PROCEDURE — 99442 ZZC PHYSICIAN TELEPHONE EVALUATION 11-20 MIN: CPT | Performed by: NURSE PRACTITIONER

## 2020-01-17 RX ORDER — QUETIAPINE FUMARATE 100 MG/1
100 TABLET, FILM COATED ORAL AT BEDTIME
Qty: 30 TABLET | Refills: 0 | Status: SHIPPED | OUTPATIENT
Start: 2020-01-17 | End: 2020-02-21

## 2020-01-17 RX ORDER — MIRTAZAPINE 30 MG/1
30 TABLET, FILM COATED ORAL AT BEDTIME
Qty: 30 TABLET | Refills: 0 | Status: SHIPPED | OUTPATIENT
Start: 2020-01-17 | End: 2020-02-05

## 2020-01-17 RX ORDER — QUETIAPINE FUMARATE 100 MG/1
100 TABLET, FILM COATED ORAL 2 TIMES DAILY PRN
Qty: 30 TABLET | Refills: 0 | Status: SHIPPED | OUTPATIENT
Start: 2020-01-17 | End: 2020-02-21

## 2020-01-17 RX ORDER — NORELGESTROMIN AND ETHINYL ESTRADIOL 35; 150 UG/MG; UG/MG
PATCH TRANSDERMAL
Start: 2020-01-17 | End: 2020-03-31

## 2020-01-17 RX ORDER — BUSPIRONE HYDROCHLORIDE 10 MG/1
10 TABLET ORAL 3 TIMES DAILY
Qty: 90 TABLET | Refills: 0 | Status: SHIPPED | OUTPATIENT
Start: 2020-01-17 | End: 2020-02-21

## 2020-01-17 RX ORDER — PRAZOSIN HYDROCHLORIDE 2 MG/1
2 CAPSULE ORAL AT BEDTIME
Qty: 30 CAPSULE | Refills: 0 | Status: SHIPPED | OUTPATIENT
Start: 2020-01-17 | End: 2020-02-05

## 2020-01-17 RX ORDER — LITHIUM CARBONATE 450 MG
900 TABLET, EXTENDED RELEASE ORAL EVERY EVENING
Qty: 60 TABLET | Refills: 0 | Status: SHIPPED | OUTPATIENT
Start: 2020-01-17 | End: 2020-02-21

## 2020-01-17 RX ORDER — LITHIUM CARBONATE 300 MG/1
300 TABLET, FILM COATED, EXTENDED RELEASE ORAL EVERY MORNING
Qty: 30 TABLET | Refills: 0 | Status: SHIPPED | OUTPATIENT
Start: 2020-01-17 | End: 2020-01-24

## 2020-01-17 RX ORDER — NAPROXEN 500 MG/1
TABLET ORAL
Start: 2020-01-17 | End: 2020-03-14

## 2020-01-17 RX ORDER — GABAPENTIN 300 MG/1
600 CAPSULE ORAL 3 TIMES DAILY
Qty: 180 CAPSULE | Refills: 0 | Status: SHIPPED | OUTPATIENT
Start: 2020-01-17 | End: 2020-02-21

## 2020-01-17 RX ORDER — FLUPHENAZINE HYDROCHLORIDE 1 MG/1
1 TABLET ORAL 2 TIMES DAILY
Qty: 60 TABLET | Refills: 0 | Status: SHIPPED | OUTPATIENT
Start: 2020-01-17 | End: 2020-02-21

## 2020-01-17 NOTE — LETTER
[facility]  [address] [address]  Phone: [phone]    January 17, 2020        Ayana Prasad  [address]  [place] [address]          To whom it may concern:    RE: Ayana Prasad    Naproxen 500 mg p.o. twice daily as needed for pain.  Diagnosis lower back pain    Ortho Evra 150-35 Remove old patch and apply new patch onto the skin once a week. May use continuously.  Diagnosis menorrhagia with regular cycle    Discontinue topical nicotine patch.    Please contact me for questions or concerns.      Sincerely,        BROOKLYN Cruz CNP

## 2020-01-17 NOTE — PROGRESS NOTES
"Ayana Prasad is a 29 year old female who is being evaluated via a billable telephone visit.      The patient has been notified of following:     \"This telephone visit will be conducted via a call between you and your physician/provider. We have found that certain health care needs can be provided without the need for a physical exam.  This service lets us provide the care you need with a short phone conversation.  If a prescription is necessary we can send it directly to your pharmacy.  If lab work is needed we can place an order for that and you can then stop by our lab to have the test done at a later time.    If during the course of the call the physician/provider feels a telephone visit is not appropriate, you will not be charged for this service.\"     Consent has been obtained for this service by 1 care team member: yes. See the scanned image in the medical record.    Ayana Prasad complains of    Chief Complaint   Patient presents with     Recheck Medication     Phone call placed to Ayana.  Nurse, Alice present as well.  Needing to have current medications refilled and needs clarification.  Received a bunch of medications from the pharmacy that are not on her current med list.    Needs to have refills of current psychiatric medications.  Is going to be establishing care with Ashlyn Armstrong next week.  Ayana reports Ashlyn does have authority to prescribe her medications.    Is currently using both nicotine patches and nicotine gum.  Reports is not chewing tobacco currently.  Would like to continue with just chewing nicotine gum.    Has been on birth control patch previously.  Would like to have restarted.  When was taking it previously took it continuously so did not get a period.  Has painful heavy periods. not currently using nicotine.  No family history of clotting disorder or strokes.  Is currently living in a group home and has not been sexually active.    I have reviewed and updated the patient's Past " Medical History, Social History, Family History and Medication List.    ALLERGIES  Haldol [haloperidol]; Adhesive tape; Percocet [oxycodone-acetaminophen]; Prednisone; Risperidone; Tramadol hcl; Droperidol; and Seroquel [quetiapine]    April ASHLYN Price   (MA signature)    Additional provider notes:    Assessment/Plan:    1. Bipolar 1 disorder, manic, mild  We will refill medications for 1 month.  Further medications to be prescribed by mental health  - gabapentin (NEURONTIN) 300 MG capsule; Take 2 capsules (600 mg) by mouth 3 times daily  Dispense: 180 capsule; Refill: 0  - fluPHENAZine (PROLIXIN) 1 MG tablet; Take 1 tablet (1 mg) by mouth 2 times daily  Dispense: 60 tablet; Refill: 0    2. Schizoaffective disorder, bipolar type (H)  We will refill medications for 1 month.  Further medications to be prescribed by mental health.- lithium (ESKALITH CR/LITHOBID) 450 MG CR tablet; Take 2 tablets (900 mg) by mouth every evening  Dispense: 60 tablet; Refill: 0  - lithium ER (LITHOBID/ESKALITH CR) 300 MG CR tablet; Take 1 tablet (300 mg) by mouth every morning  Dispense: 30 tablet; Refill: 0  - mirtazapine (REMERON) 30 MG tablet; Take 1 tablet (30 mg) by mouth At Bedtime  Dispense: 30 tablet; Refill: 0  - QUEtiapine (SEROQUEL) 100 MG tablet; Take 1 tablet (100 mg) by mouth At Bedtime  Dispense: 30 tablet; Refill: 0  - QUEtiapine (SEROQUEL) 100 MG tablet; Take 1 tablet (100 mg) by mouth 2 times daily as needed  Dispense: 30 tablet; Refill: 0  - prazosin (MINIPRESS) 2 MG capsule; Take 1 capsule (2 mg) by mouth At Bedtime  Dispense: 30 capsule; Refill: 0  - fluPHENAZine (PROLIXIN) 1 MG tablet; Take 1 tablet (1 mg) by mouth 2 times daily  Dispense: 60 tablet; Refill: 0    3. Chronic bilateral low back pain without sciatica  Prescription for naproxen to use as needed for pain  - naproxen (NAPROSYN) 500 MG tablet; Take twice per day with food as needed for pain.    4. Menorrhagia with regular cycle  Pap up-to-date.  We will plan  to restart birth control patch.  Educated on use.  May use continuously.  - norelgestromin-ethinyl estradiol (ORTHO EVRA) 150-35 MCG/24HR patch; Remove old patch and apply new patch onto the skin once a week for 3 weeks (21 days).  May use continuously    I have reviewed the note as documented above.  This accurately captures the substance of my conversation with the patient.      Total time of call between patient and provider was 14 minutes     Becki JENKINS

## 2020-01-21 NOTE — PROGRESS NOTES
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 impacting treatment include Occupational Difficulties, Financial Difficulties and Relationship Difficulties. Psychosocial stressors were identified as financial hardship, homelessness, limited social support, mental health symptoms, occupational / vocational stress and relationship stress. Ayana  has evidence of functional impairment including difficulty with home-family, work and daily responsibilities. More specifically, Ayana is in need of multiple supports to remain independent in the community. She is currently getting these supports through at [facility], but will likely need additional supports upon discharge.  Goal is to increase their functioning detailed above and assist Ayana to make progress towards their goals.  Ayana identified the following factors that will help them succeed in recovery include , nagi / spirituality and family support. Things that may interfere with their success include poor insight, unstable living environment and unemployed.    Ayana is currently engaged in services in the community; including mental health targeted case management. It will be important for members of Ayana's treatment team to have regular  "communication due to patient's limited insight into symptoms and difficulty in reporting her past history.    Ayana agrees to treatment with the capacity to do so. Agrees to call clinic for any problems. The patient understands to call 911 or come to the nearest ED if life threatening or urgent symptoms present.    Billing for \"Interactive Complexity\"?    No    Plan   Psychotherapy: Ayana was interested in meeting with a therapist. Ayana would benefit from Supportive therapy. Ayana was previously seen through [facility] team for services. Psychotherapy referrals and resources can be provided upon discharge from [facility] facility.    Medication Management: Ayana is in need of Medication Management, and will have an evaluation with Ashlyn Fields in 1 week. She has been followed for medication management through the [facility] program but it appears these services are ending.   Continue to follow recommendations of [facility] team until meeting with Ashlyn Fields.    Case Management: Ayana is followed by a .  Case Management is an identified need at this time.     Other Psychosocial Supports: none provided at this time. Ayana is currently at an intensive residential treatment facility and has a  that can assist with referrals to psychosocial supports.     Medical Referrals: none at this time.       Sonia Bolivar, LICSW    "

## 2020-01-22 ENCOUNTER — OFFICE VISIT (OUTPATIENT)
Dept: PSYCHIATRY | Facility: CLINIC | Age: 30
End: 2020-01-22
Attending: NURSE PRACTITIONER
Payer: COMMERCIAL

## 2020-01-22 VITALS
WEIGHT: 226 LBS | HEART RATE: 108 BPM | SYSTOLIC BLOOD PRESSURE: 122 MMHG | DIASTOLIC BLOOD PRESSURE: 83 MMHG | BODY MASS INDEX: 37.61 KG/M2

## 2020-01-22 DIAGNOSIS — F25.0 SCHIZOAFFECTIVE DISORDER, BIPOLAR TYPE (H): Primary | ICD-10-CM

## 2020-01-22 DIAGNOSIS — R45.851 SUICIDAL IDEATION: ICD-10-CM

## 2020-01-22 DIAGNOSIS — Z79.899 ENCOUNTER FOR LONG-TERM (CURRENT) USE OF MEDICATIONS: ICD-10-CM

## 2020-01-22 LAB — INTERPRETATION ECG - MUSE: NORMAL

## 2020-01-22 PROCEDURE — 93010 ELECTROCARDIOGRAM REPORT: CPT | Performed by: INTERNAL MEDICINE

## 2020-01-22 PROCEDURE — 93005 ELECTROCARDIOGRAM TRACING: CPT | Mod: ZF | Performed by: NURSE PRACTITIONER

## 2020-01-22 PROCEDURE — G0463 HOSPITAL OUTPT CLINIC VISIT: HCPCS | Mod: ZF

## 2020-01-22 RX ORDER — FLUPHENAZINE DECANOATE 25 MG/ML
25 INJECTION, SOLUTION INTRAMUSCULAR; SUBCUTANEOUS
Qty: 6 ML | Refills: 0 | Status: CANCELLED | OUTPATIENT
Start: 2020-01-22

## 2020-01-22 ASSESSMENT — PAIN SCALES - GENERAL: PAINLEVEL: NO PAIN (0)
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
## 2020-01-22 NOTE — NURSING NOTE
Chief Complaint   Patient presents with     Eval/Assessment     Schizoaffective disorder, bipolar type

## 2020-01-22 NOTE — PATIENT INSTRUCTIONS
Thank you for coming to the [facility].    Lab Testing:  If you had lab testing today and your results are reassuring or normal they will be mailed to you or sent through [facility] within 7 days.   If the lab tests need quick action we will call you with the results.  The phone number we will call with results is # [phone] (home) . If this is not the best number please call our clinic and change the number.    Medication Refills:  If you need any refills please call your pharmacy and they will contact us. Our fax number for refills is [phone]. Please allow three business for refill processing.   If you need to  your refill at a new pharmacy, please contact the new pharmacy directly. The new pharmacy will help you get your medications transferred.     Scheduling:  If you have any concerns about today's visit or wish to schedule another appointment please call our office during normal business hours [phone] (8-5:00 M-F)    Contact Us:  Please call [phone] during business hours (8-5:00 M-F).  If after clinic hours, or on the weekend, please call  [phone].    Financial Assistance [phone]  [facility] Billing [phone]  Central Billing Office, [facility]: [phone]  [facility] Billing [phone]  Medical Records [phone]      MENTAL HEALTH CRISIS NUMBERS:  [facility]:   [facility] - [phone]   Crisis Residence [facility] Residence - [phone]   Walk-In Counseling [facility] [phone]   COPE 24/7 [facility] Mobile Team for Adults - [[phone]]; Child - [[phone]]        [facility]:   [facility] - [phone]   Walk-in counseling [facility] - [phone]   Walk-in counseling [facility] - [phone]   Crisis Residence [facility] - [phone]   Urgent Care Adult Mental Health:   --Drop-in, 24/7 crisis line, and [employer] Mobile Team  [[phone]]    CRISIS TEXT LINE: Text [phone] from anywhere, anytime, any crisis 24/7;    OR SEE www.crisistextline.org     National Suicide Prevention Lifeline: 580-540-TJEU (161-170-8311) or dial 988    Poison Control Center - 0-374-542-3824    CHILD: [facility] needs assessment team - [phone]     [facility] Lifeline - 1-531.529.7879; or [facility] Lifeline - 1-115.634.8181    If you have a medical emergency please call 911or go to the nearest ER.                    _____________________________________________    Again thank you for choosing [facility] and please let us know how we can best partner with you to improve you and your family's health.  You may be receiving a survey regarding this appointment. We would love to have your feedback, both positive and negative. The survey is done by an external company, so your answers are anonymous.

## 2020-01-23 ENCOUNTER — TELEPHONE (OUTPATIENT)
Dept: PSYCHIATRY | Facility: CLINIC | Age: 30
End: 2020-01-23

## 2020-01-23 NOTE — TELEPHONE ENCOUNTER
[facility] Call Center    Phone Message    May a detailed message be left on voicemail: yes    Reason for Call: Medication Question or concern regarding medication   Prescription Clarification  Name of Medication: lithium  Prescribing Provider: BROOKLYN Cruz CNP   What on the order needs clarification? Pt's lithium orders were written for 300mg AM and 600mg HS. The caller stated that the pt was previously taking 900mg HS. She wants to clarify whether Ashlyn meant to lower her HS dose from 900 to 600, or if this was written in error.          Action Taken: Message routed to: KATHIE Roque

## 2020-01-24 NOTE — TELEPHONE ENCOUNTER
Ashlyn Fields, Colleen Helms CNP, RN   Caller: Unspecified (Yesterday,  3:41 PM)             My mistake. LIthium simplified inpatient to 900mg at bedtime, it was 300mg QAM and 600mg at bedtime. I left a VM for RN and DCd the 300mg in the chart.

## 2020-01-24 NOTE — TELEPHONE ENCOUNTER
Writer called SRINIVAS Piña at [facility] at [phone]. No answer. LVM requesting a c/b. Will confirm dosing schedule with the provider in the meantime.

## 2020-01-24 NOTE — TELEPHONE ENCOUNTER
Writer received incoming refill request for pt's fluphenazine. She has been receiving injection in clinic.    Called [facility] and was informed by Arian that medication was delivered to the pt's living facility on 12/24. This prescription was written on 12/18 by Dr. Hamilton. Pt also received injection in clinic on 12/26. Have not heard about any insurance issues at this time. Asked that [facility] discontinue the prescription, as she will continue receiving the injection in clinic.     Will forward encounter to the provider to confirm if pt will continue receiving the injection in clinic.

## 2020-01-27 NOTE — TELEPHONE ENCOUNTER
Ashlyn Fields, Colleen Helms CNP, RN   Caller: Unspecified (5 days ago,  1:22 PM)             Does patient or group home have a preference?      Writer called the pt's living facility at the number in an earlier message. Still no answer. LVM requesting a c/b.    Called pt at the number in the demographics section. No answer. LVM requesting a c/b.

## 2020-02-03 ENCOUNTER — TELEPHONE (OUTPATIENT)
Dept: FAMILY MEDICINE | Facility: CLINIC | Age: 30
End: 2020-02-03

## 2020-02-03 ENCOUNTER — OFFICE VISIT (OUTPATIENT)
Dept: FAMILY MEDICINE | Facility: CLINIC | Age: 30
End: 2020-02-03
Payer: COMMERCIAL

## 2020-02-03 VITALS
HEART RATE: 84 BPM | HEIGHT: 67 IN | DIASTOLIC BLOOD PRESSURE: 70 MMHG | RESPIRATION RATE: 20 BRPM | TEMPERATURE: 97.9 F | SYSTOLIC BLOOD PRESSURE: 110 MMHG | WEIGHT: 216.4 LBS | BODY MASS INDEX: 33.97 KG/M2

## 2020-02-03 DIAGNOSIS — R07.0 THROAT PAIN: Primary | ICD-10-CM

## 2020-02-03 DIAGNOSIS — R31.9 HEMATURIA, UNSPECIFIED TYPE: ICD-10-CM

## 2020-02-03 DIAGNOSIS — J02.0 STREPTOCOCCAL PHARYNGITIS: ICD-10-CM

## 2020-02-03 LAB
ANION GAP SERPL CALCULATED.3IONS-SCNC: 5 MMOL/L (ref 3–14)
BUN SERPL-MCNC: 13 MG/DL (ref 7–30)
CALCIUM SERPL-MCNC: 9.3 MG/DL (ref 8.5–10.1)
CHLORIDE SERPL-SCNC: 106 MMOL/L (ref 94–109)
CO2 SERPL-SCNC: 23 MMOL/L (ref 20–32)
CREAT SERPL-MCNC: 0.76 MG/DL (ref 0.52–1.04)
DEPRECATED S PYO AG THROAT QL EIA: ABNORMAL
GFR SERPL CREATININE-BSD FRML MDRD: >90 ML/MIN/{1.73_M2}
GLUCOSE SERPL-MCNC: 106 MG/DL (ref 70–99)
POTASSIUM SERPL-SCNC: 4.2 MMOL/L (ref 3.4–5.3)
SODIUM SERPL-SCNC: 134 MMOL/L (ref 133–144)
SPECIMEN SOURCE: ABNORMAL

## 2020-02-03 PROCEDURE — 99213 OFFICE O/P EST LOW 20 MIN: CPT | Performed by: NURSE PRACTITIONER

## 2020-02-03 PROCEDURE — 87880 STREP A ASSAY W/OPTIC: CPT | Performed by: NURSE PRACTITIONER

## 2020-02-03 PROCEDURE — 80048 BASIC METABOLIC PNL TOTAL CA: CPT | Performed by: NURSE PRACTITIONER

## 2020-02-03 PROCEDURE — 36415 COLL VENOUS BLD VENIPUNCTURE: CPT | Performed by: NURSE PRACTITIONER

## 2020-02-03 RX ORDER — AMOXICILLIN 500 MG/1
500 CAPSULE ORAL 2 TIMES DAILY
Qty: 20 CAPSULE | Refills: 0 | Status: SHIPPED | OUTPATIENT
Start: 2020-02-03 | End: 2020-03-30

## 2020-02-03 ASSESSMENT — ENCOUNTER SYMPTOMS
EYE DISCHARGE: 0
HEMATURIA: 1
DYSURIA: 0
HEADACHES: 0
SINUS PRESSURE: 0
DIZZINESS: 0
FLANK PAIN: 0
DIARRHEA: 0
CONSTIPATION: 0
SORE THROAT: 1
MYALGIAS: 1
COUGH: 0
LIGHT-HEADEDNESS: 0
NAUSEA: 0
SHORTNESS OF BREATH: 1
WHEEZING: 0
CHILLS: 1
RHINORRHEA: 0
FEVER: 0
DIAPHORESIS: 0
EYE ITCHING: 0
FATIGUE: 0

## 2020-02-03 ASSESSMENT — PAIN SCALES - GENERAL: PAINLEVEL: SEVERE PAIN (7)

## 2020-02-03 ASSESSMENT — MIFFLIN-ST. JEOR: SCORE: 1739.21

## 2020-02-03 NOTE — PROGRESS NOTES
Subjective     Ayana Prasad is a 29 year old female who presents to clinic today for the following health issues:    HPI       URINARY TRACT SYMPTOMS      Duration: 2 days ago    Description  hematuria    Intensity:  mild    Accompanying signs and symptoms: sore throat  Fever/chills: no   Flank pain no   Nausea and vomiting: no   Vaginal symptoms: none  Abdominal/Pelvic Pain: no     History  History of frequent UTI's: no   History of kidney stones: YES  Sexually Active: no   Possibility of pregnancy: No    Precipitating or alleviating factors: None    Therapies tried and outcome: Naproxen Outcome: not effective    Current Outpatient Medications   Medication Sig Dispense Refill     amoxicillin (AMOXIL) 500 MG capsule Take 1 capsule (500 mg) by mouth 2 times daily for 10 days 20 capsule 0     benztropine (COGENTIN) 1 MG tablet Take 1 tablet (1 mg) by mouth 3 times daily as needed for agitation 60 tablet 0     busPIRone (BUSPAR) 10 MG tablet Take 1 tablet (10 mg) by mouth 3 times daily 90 tablet 0     fluPHENAZine (PROLIXIN) 1 MG tablet Take 1 tablet (1 mg) by mouth 2 times daily 60 tablet 0     fluPHENAZine decanoate (PROLIXIN) 25 MG/ML injection Inject 1 mL (25 mg) into the muscle every 14 days 6 mL 0     gabapentin (NEURONTIN) 300 MG capsule Take 2 capsules (600 mg) by mouth 3 times daily 180 capsule 0     lithium (ESKALITH CR/LITHOBID) 450 MG CR tablet Take 2 tablets (900 mg) by mouth every evening 60 tablet 0     mirtazapine (REMERON) 30 MG tablet Take 1 tablet (30 mg) by mouth At Bedtime 30 tablet 0     naproxen (NAPROSYN) 500 MG tablet Take twice per day with food as needed for pain.       nicotine (NICORETTE) 2 MG gum Place 1 each (2 mg) inside cheek as needed for smoking cessation 100 each 1     norelgestromin-ethinyl estradiol (ORTHO EVRA) 150-35 MCG/24HR patch Remove old patch and apply new patch onto the skin once a week for 3 weeks (21 days).  May use continuously       prazosin (MINIPRESS) 2 MG capsule  "Take 1 capsule (2 mg) by mouth At Bedtime 30 capsule 0     QUEtiapine (SEROQUEL) 100 MG tablet Take 1 tablet (100 mg) by mouth At Bedtime 30 tablet 0     traZODone (DESYREL) 50 MG tablet Take 1 tablet (50 mg) by mouth nightly as needed for sleep 30 tablet 0     QUEtiapine (SEROQUEL) 100 MG tablet Take 1 tablet (100 mg) by mouth 2 times daily as needed 30 tablet 0     Allergies   Allergen Reactions     Haldol [Haloperidol] Other (See Comments)     Makes patient very angry and anxious     Adhesive Tape Hives     Percocet [Oxycodone-Acetaminophen] Nausea and Vomiting     Prednisone Other (See Comments) and Hives     Suicidal ideation     Risperidone Other (See Comments)     Tramadol Hcl Nausea and Vomiting     Droperidol Anxiety     Seroquel [Quetiapine] Palpitations     Spent 2 weeks in the hospital due to having seroquel, caused palpitations and QT prolongation       Reviewed and updated as needed this visit by Provider         Objective    /70 (BP Location: Left arm, Patient Position: Sitting, Cuff Size: Adult Regular)   Pulse 84   Temp 97.9  F (36.6  C) (Tympanic)   Resp 20   Ht 1.702 m (5' 7\")   Wt 98.2 kg (216 lb 6.4 oz)   BMI 33.89 kg/m    Body mass index is 33.89 kg/m .    Sore throat for the last 7 days. Aching and chills. No cough. Does feel short of breath at times. Will take some naproxen and that really does not help much. Ears are not hurting. Has been drinking ok, not eating as much due to throat pain. Feels like is dehydrated because of the sore throat. No increased back pain. Did have blood urine the last couple of days. Urine looked dark and red. No nausea. Others at her group home have been positive for strep.             Assessment & Plan     Review of Systems   Constitutional: Positive for chills. Negative for diaphoresis, fatigue and fever.   HENT: Positive for sore throat. Negative for ear discharge, ear pain, hearing loss, rhinorrhea and sinus pressure.    Eyes: Negative for " discharge and itching.   Respiratory: Positive for shortness of breath. Negative for cough and wheezing.    Gastrointestinal: Negative for constipation, diarrhea and nausea.   Genitourinary: Positive for hematuria. Negative for dysuria, flank pain and urgency.   Musculoskeletal: Positive for myalgias.   Skin: Negative for rash.   Neurological: Negative for dizziness, light-headedness and headaches.       Physical Exam  Constitutional:       Appearance: She is well-developed.   HENT:      Right Ear: Tympanic membrane and external ear normal. No middle ear effusion. Tympanic membrane is not erythematous.      Left Ear: Tympanic membrane and external ear normal.  No middle ear effusion. Tympanic membrane is not erythematous.      Nose: No mucosal edema or rhinorrhea.      Mouth/Throat:      Pharynx: Oropharyngeal exudate present.      Tonsils: No tonsillar exudate. 3+ on the right. 3+ on the left.   Cardiovascular:      Rate and Rhythm: Normal rate and regular rhythm.      Heart sounds: Normal heart sounds.   Pulmonary:      Effort: Pulmonary effort is normal.      Breath sounds: Normal breath sounds.   Abdominal:      General: Bowel sounds are normal.      Palpations: Abdomen is soft.   Skin:     General: Skin is warm and dry.   Neurological:      Mental Status: She is alert.         1. Throat pain  - Rapid strep screen    2. Hematuria, unspecified type  Plans to do urine test at group home and return it to clinic   - UA reflex to Microscopic and Culture; Future    3. Streptococcal pharyngitis  Reviewed treatment plan and roll of antibiotics  Discussed importance of hydration and role of antipyretics and lozenges for comfort  Instructed to call or return for   --Symptoms not improved   --Worsening fever or throat pain  --Unable to swallow or difficulty breathing  --New or unexplained symptoms   - Basic metabolic panel  - amoxicillin (AMOXIL) 500 MG capsule; Take 1 capsule (500 mg) by mouth 2 times daily for 10 days   Dispense: 20 capsule; Refill: 0       Return in about 1 week (around 2/10/2020), or if symptoms worsen or fail to improve.    BROOKLYN Cruz [facility]

## 2020-02-03 NOTE — LETTER
February 4, 2020      Ayana Prasad  [address] [place] [address]          Ayana,     Your electrolytes and kidney function is normal.       Resulted Orders   Rapid strep screen   Result Value Ref Range    Specimen Description Throat     Rapid Strep A Screen (A)      POSITIVE: Group A Streptococcal antigen detected by immunoassay.   Basic metabolic panel   Result Value Ref Range    Sodium 134 133 - 144 mmol/L    Potassium 4.2 3.4 - 5.3 mmol/L    Chloride 106 94 - 109 mmol/L    Carbon Dioxide 23 20 - 32 mmol/L    Anion Gap 5 3 - 14 mmol/L    Glucose 106 (H) 70 - 99 mg/dL      Comment:      Non Fasting    Urea Nitrogen 13 7 - 30 mg/dL    Creatinine 0.76 0.52 - 1.04 mg/dL    GFR Estimate >90 >60 mL/min/[1.73_m2]      Comment:      Non  GFR Calc  Starting 12/18/2018, serum creatinine based estimated GFR (eGFR) will be   calculated using the Chronic Kidney Disease Epidemiology Collaboration   (CKD-EPI) equation.      GFR Estimate If Black >90 >60 mL/min/[1.73_m2]      Comment:       GFR Calc  Starting 12/18/2018, serum creatinine based estimated GFR (eGFR) will be   calculated using the Chronic Kidney Disease Epidemiology Collaboration   (CKD-EPI) equation.      Calcium 9.3 8.5 - 10.1 mg/dL       If you have any questions or concerns, please call the clinic at the number listed above.       Sincerely,        BROOKLYN Cruz CNP/ag

## 2020-02-05 ENCOUNTER — MEDICAL CORRESPONDENCE (OUTPATIENT)
Dept: HEALTH INFORMATION MANAGEMENT | Facility: CLINIC | Age: 30
End: 2020-02-05

## 2020-02-05 ENCOUNTER — OFFICE VISIT (OUTPATIENT)
Dept: PSYCHIATRY | Facility: CLINIC | Age: 30
End: 2020-02-05
Attending: NURSE PRACTITIONER
Payer: COMMERCIAL

## 2020-02-05 VITALS
SYSTOLIC BLOOD PRESSURE: 125 MMHG | HEART RATE: 103 BPM | DIASTOLIC BLOOD PRESSURE: 88 MMHG | BODY MASS INDEX: 33.64 KG/M2 | WEIGHT: 214.8 LBS

## 2020-02-05 DIAGNOSIS — F25.0 SCHIZOAFFECTIVE DISORDER, BIPOLAR TYPE (H): Primary | ICD-10-CM

## 2020-02-05 PROCEDURE — G0463 HOSPITAL OUTPT CLINIC VISIT: HCPCS | Mod: ZF

## 2020-02-05 ASSESSMENT — PAIN SCALES - GENERAL: PAINLEVEL: NO PAIN (0)
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
## 2020-02-05 NOTE — PATIENT INSTRUCTIONS
Thank you for coming to the PSYCHIATRY CLINIC.    Lab Testing:  If you had lab testing today and your results are reassuring or normal they will be mailed to you or sent through [facility] within 7 days.   If the lab tests need quick action we will call you with the results.  The phone number we will call with results is # [phone] (home) . If this is not the best number please call our clinic and change the number.    Medication Refills:  If you need any refills please call your pharmacy and they will contact us. Our fax number for refills is [phone]. Please allow three business for refill processing.   If you need to  your refill at a new pharmacy, please contact the new pharmacy directly. The new pharmacy will help you get your medications transferred.     Scheduling:  If you have any concerns about today's visit or wish to schedule another appointment please call our office during normal business hours [phone] (8-5:00 M-F)    Contact Us:  Please call [phone] during business hours (8-5:00 M-F).  If after clinic hours, or on the weekend, please call  [phone].    Financial Assistance [phone]  [facility] Billing [phone]  Central Billing Office, [facility]: [phone]  [facility] Billing [phone]  Medical Records [phone]      MENTAL HEALTH CRISIS NUMBERS:  [facility]:   [facility] - [phone]   Crisis Residence [facility] - [phone]   Walk-In Counseling [facility] [phone]   COPE 24/7 [place] Mobile Team for Adults - [[phone]]; Child - [[phone]]        [facility]:   [facility] - [phone]   Walk-in counseling [facility] - [phone]   Walk-in counseling [facility] - [phone]   Crisis Residence [facility] - [phone]   Urgent Care Adult Mental Health:   --Drop-in, 24/7 crisis line, and [place] Mobile Team  [[phone]]    CRISIS TEXT LINE: Text [phone] from anywhere, anytime, any crisis 24/7;    OR SEE www.crisistextline.org     National Suicide Prevention Lifeline: 297-720-LTDZ (016-934-1757)    Poison Control Center - [phone]    CHILD: [facility] needs assessment team - [phone]     [facility] Lifeline - [phone]; or [facility] Lifeline - [phone]    If you have a medical emergency please call 911or go to the nearest ER.                    _____________________________________________    Again thank you for choosing PSYCHIATRY CLINIC and please let us know how we can best partner with you to improve you and your family's health.  You may be receiving a survey regarding this appointment. We would love to have your feedback, both positive and negative. The survey is done by an external company, so your answers are anonymous.

## 2020-02-10 DIAGNOSIS — R45.851 SUICIDAL IDEATION: ICD-10-CM

## 2020-02-10 NOTE — TELEPHONE ENCOUNTER
Refill Request (Lithium )     Ashlyn Fields, BROOKLYN CNP  You 23 minutes ago (3:18 PM)      I believe staff called to clarify this on a day you were out. Meds simplified during inpatient admit to 900mg at bedtime. Lithium CR 450mg (2) at bedtime sent #[phone] on 1/17/2020. Does this help?    Routing comment      Writer placed a call to nurse, Tiff at [phone] and updated her on the updated order for Lithium. Informed her that patient should be receiving Lithium 900 mg at bedtime. She verbalized understanding. She is requesting a letter with the medication change faxed to [phone]. Writer agreed with the plan. Letter drafted and placed in providers folder to review and sign.

## 2020-02-10 NOTE — TELEPHONE ENCOUNTER
Writer received incoming call from nurseBria at [facility] ([phone]) wanting clarification on Lithium dosing. Nurse shared that patient has been receiving Lithium 300 mg in the morning and 900 mg at bedtime. She also went on by saying patient has not been receiving the morning dose due to running out of the medication. Writer agreed to connect with provider to confirm the dose as the last office visit is unsigned.     Routed to provider

## 2020-02-10 NOTE — LETTER
February 10, 2020      TO: Ayana Prasad  [address] [place] [address]         To Whom it May Concern,       Ayana Prasad is followed by Ashlyn Fields at the [facility], Psychiatry clinic. Ayana was last seen in clinic on 2/5/20 and mediation changes were completed. She is now to take the following medications. Please fax back a MAR for Prolixin injection every 2 weeks. Would like to confirm if patient should be receiving the injection at IRTS or in the clinic. She last received the injection in clinic on 1/7/20 and 12/26/19.     1. Prolixin 25mg Injection [id number] - Please confirm where the patient should be receiving this injection  2. Cogentin 1mg TID  3. Buspar 10mg TID  4. Gabapentin 600mg TID (PCP refilled for nerve pain, also indicated for mood)  5. lithium CR 900mg at bedtime  6. Seroquel 100mg BID PRN and at bedtime scheduled  7. Patient requests to stop Remeron 30mg (she's not taken in the last week)   8. Reduce oral Prolixin to 1mg QHS  9. Patinet requests to stay off Prazosin 2mg and trazodone 50mg         Sincerely,         Ashlyn Fields, BROOKLYN CNP

## 2020-02-11 ENCOUNTER — TELEPHONE (OUTPATIENT)
Dept: PSYCHIATRY | Facility: CLINIC | Age: 30
End: 2020-02-11

## 2020-02-11 NOTE — TELEPHONE ENCOUNTER
Medication Refill     Ashlyn Fields, BROOKLYN CNP  You; Jaelyn Mascorro LPN 6 days ago      FYI     Patient signed VAL (), called RN Tiff at [facility] Ayana Hernandez's TS but mail box was full. Would like to confirm details for most recent Prolixin injection at [facility] and that staff is aware she stopped Remeron about a week ago. Jaelyn- ARTHUR on patient I called you about. She said [facility]S is giving injection.    Routing comment      Writer placed a call to nurseTiff at [phone] to confirm Remeron dosing and administration and Prolixin injection. The nurse was unable to confirm if the patient had received the injection delivered By [facility] at [facility] on 12/24 (please see encounter from 1/22/20). She was also unable to confirm the dose of Remeron patient was receiving and if patient was refusing the mediation. Writer informed the nurse to send the MAR for Remeron and Prolixin. She agreed with the plan. She requested this writer send the current medication list from 2/5/20 appt. Writer agreed with the plan. Letter drafted and placed in providers folder to review and sign.

## 2020-02-11 NOTE — TELEPHONE ENCOUNTER
[facility]      Patient Name:  Ayana Prasad  /Age:  1990 (29 year old)      Intervention: Left voice mail for patient to contact clinic to schedule DBT intake.     Status of Referral: Pending contact with patient/family.    Plan: Schedule 90 minute DBT evaluation/intake with Catrina Red.  Send New Patient Adult DBT packet to patient once she is scheduled.      Alice Lujan,     [facility]

## 2020-02-12 DIAGNOSIS — R45.851 SUICIDAL IDEATION: ICD-10-CM

## 2020-02-12 RX ORDER — FLUPHENAZINE DECANOATE 25 MG/ML
25 INJECTION, SOLUTION INTRAMUSCULAR; SUBCUTANEOUS
Qty: 6 ML | Refills: 5 | Status: SHIPPED | OUTPATIENT
Start: 2020-02-12 | End: 2020-07-14

## 2020-02-12 RX ORDER — FLUPHENAZINE DECANOATE 25 MG/ML
25 INJECTION, SOLUTION INTRAMUSCULAR; SUBCUTANEOUS
Qty: 6 ML | Refills: 1 | Status: CANCELLED | OUTPATIENT
Start: 2020-02-12

## 2020-02-12 NOTE — TELEPHONE ENCOUNTER
Writer received signed letter from provider with list of patient's current mediation   Letter faxed to [phone]

## 2020-02-12 NOTE — TELEPHONE ENCOUNTER
Writer received a call from nurseMariela who confirmed the fax of the list of medication was received on her end. She agreed to call this writer for further questions.     Prolixin order refilled by provider   Med tab changed to reflect this   Nurse notified

## 2020-02-12 NOTE — TELEPHONE ENCOUNTER
Vernr received incoming call from nurseMariela at [facility] facility. She confirmed that patient is receiving Prolixin as prescribed. She did request a refill for the injection be sent to [id number] - [place], [place] - [id number]. Vernr agreed to route to provider for approval. She also confirmed that a fax was not received on their end. She requested this write re-fax the letter with current medication list. Vernr agreed with the plan and faxed the letter to [phone].

## 2020-02-13 ENCOUNTER — TELEPHONE (OUTPATIENT)
Dept: PSYCHIATRY | Facility: CLINIC | Age: 30
End: 2020-02-13

## 2020-02-13 NOTE — TELEPHONE ENCOUNTER
[facility]      Patient Name:  Ayana Prasad  /Age:  1990 (29 year old)      Intervention: Pt called Clinic earlier this afternoon to schedule DBE. No one in Intake was available at the time to take the call. The writer returned her call and lvm again inviting pt to c/b to schedule DBE. Informed pt that she can leave a message on the Clinic voicemail, or call anytime after 8am tomorrrow when the Clinic phone lines can receive incoming calls.    Status of Referral: Pending contact with patient/family.    Plan: Pending contact with patient/family.     Plan: Schedule 90 minute DBT evaluation/intake with Catrina Red.  Send New Patient Adult DBT packet to patient once she is scheduled.       Anabel Garcia,     Psychiatry Clinic

## 2020-02-13 NOTE — TELEPHONE ENCOUNTER
"Writer received incoming call from patient and nurse Mariela at [facility] facility ([phone]) wanting clarification on patients lithium dosing. Patient at this time is requesting to increase the Lithium dose. She is currently taking Lithium 900 mg at bedtime. Writer asked to speak with the patient directly. The phone was handed to the patient. Patient is requesting to take a Lithium dose in the morning. She reports previously taking 300 mg AM and 900 mg HS. She is requesting to go back 1200 mg daily dose. When asked the reasoning for the increase, she mentioned \" I have always been taking it that way.\" Regardless of the increase, patient is requesting to take a dose of Lithium in the morning. Writer agreed to reach out to provider to confirm if patient would be able to take lithium 450 mg BID instead of 900 mg HS. Writer also updated the patient, that provider might want to get some lab tests or want to discuss this further in clinic before the dose will be increased. She verbalized understanding. Nurse agreed to fax MAR for lithium, Remeon and Prolixin. Writer agreed to reach out to provider and return a call back with recommendations.   "

## 2020-02-14 DIAGNOSIS — F25.0 SCHIZOAFFECTIVE DISORDER, BIPOLAR TYPE (H): Primary | ICD-10-CM

## 2020-02-14 DIAGNOSIS — Z79.899 ENCOUNTER FOR LONG-TERM (CURRENT) USE OF MEDICATIONS: ICD-10-CM

## 2020-02-17 NOTE — TELEPHONE ENCOUNTER
Medication Question (Lithium )     Ashlyn Fields, BROOKLYN CNP  You 3 days ago      Spoke with SRINIVAS Piña today. Shared no dose increase without med visit and lithium level. She can choose to take lithium 450mg BID or 900mg at bedtime. Future order placed for lithium.    Routing comment

## 2020-02-19 ENCOUNTER — OFFICE VISIT (OUTPATIENT)
Dept: PSYCHIATRY | Facility: CLINIC | Age: 30
End: 2020-02-19
Attending: NURSE PRACTITIONER
Payer: COMMERCIAL

## 2020-02-19 VITALS
DIASTOLIC BLOOD PRESSURE: 90 MMHG | WEIGHT: 210.6 LBS | HEART RATE: 106 BPM | BODY MASS INDEX: 32.98 KG/M2 | SYSTOLIC BLOOD PRESSURE: 126 MMHG

## 2020-02-19 DIAGNOSIS — F31.11 BIPOLAR 1 DISORDER, MANIC, MILD (H): ICD-10-CM

## 2020-02-19 DIAGNOSIS — R45.851 SUICIDAL IDEATION: ICD-10-CM

## 2020-02-19 DIAGNOSIS — F60.3 BORDERLINE PERSONALITY DISORDER (H): ICD-10-CM

## 2020-02-19 DIAGNOSIS — F25.0 SCHIZOAFFECTIVE DISORDER, BIPOLAR TYPE (H): Primary | ICD-10-CM

## 2020-02-19 DIAGNOSIS — Z79.899 ENCOUNTER FOR LONG-TERM (CURRENT) USE OF MEDICATIONS: ICD-10-CM

## 2020-02-19 DIAGNOSIS — F41.1 GENERALIZED ANXIETY DISORDER: ICD-10-CM

## 2020-02-19 PROCEDURE — G0463 HOSPITAL OUTPT CLINIC VISIT: HCPCS | Mod: ZF

## 2020-02-19 ASSESSMENT — PAIN SCALES - GENERAL: PAINLEVEL: NO PAIN (0)
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
## 2020-02-20 ENCOUNTER — TELEPHONE (OUTPATIENT)
Dept: PSYCHIATRY | Facility: CLINIC | Age: 30
End: 2020-02-20

## 2020-02-20 NOTE — TELEPHONE ENCOUNTER
Writer received second incoming phone call from Mariela. She said the pt has about three days remaining of most of her meds. Mariela is requesting refills of her medications and asked that they are sent to [facility] on [address] in [place]. Agreed to reach out to the provider and will call Mariela with an update once these are sent.

## 2020-02-20 NOTE — TELEPHONE ENCOUNTER
Writer received incoming phone call from [facility]. She informed this writer that pt is still requesting to increase her lithium dose to 300 mg QAM + 900 mg at bedtime. Reviewed pt's chart, as it appears the pt was seen yesterday for an appt.     Per provider's note, pt may need to complete lithium trough level before increasing the dose. Writer relayed this to Mariela, but agreed to confirm if this is the plan as well.     Provider entered lithium level order on 2/14, but this has not been completed. Explained that pt should be able to go to any [facility] lab and have this drawn. Once resulted, provider can approve an increase if the level is within the reference range.     Mariela voiced understanding of the plan and will relay this to the pt. She requests a c/b with the lab result and medication directions once resulted. Mariela can be reached at [phone].

## 2020-02-21 RX ORDER — BENZTROPINE MESYLATE 1 MG/1
1 TABLET ORAL 3 TIMES DAILY PRN
Qty: 90 TABLET | Refills: 0 | Status: SHIPPED | OUTPATIENT
Start: 2020-02-21 | End: 2020-03-13

## 2020-02-21 RX ORDER — LITHIUM CARBONATE 450 MG
TABLET, EXTENDED RELEASE ORAL
Qty: 60 TABLET | Refills: 0 | Status: SHIPPED | OUTPATIENT
Start: 2020-02-21 | End: 2020-03-04

## 2020-02-21 RX ORDER — FLUPHENAZINE HYDROCHLORIDE 1 MG/1
TABLET ORAL
Qty: 30 TABLET | Refills: 0 | Status: SHIPPED | OUTPATIENT
Start: 2020-02-21 | End: 2020-04-08

## 2020-02-21 RX ORDER — GABAPENTIN 300 MG/1
600 CAPSULE ORAL 3 TIMES DAILY
Qty: 180 CAPSULE | Refills: 0 | Status: SHIPPED | OUTPATIENT
Start: 2020-02-21 | End: 2020-03-13

## 2020-02-21 RX ORDER — BUSPIRONE HYDROCHLORIDE 10 MG/1
10 TABLET ORAL 3 TIMES DAILY
Qty: 90 TABLET | Refills: 0 | Status: SHIPPED | OUTPATIENT
Start: 2020-02-21 | End: 2020-02-28

## 2020-02-21 RX ORDER — QUETIAPINE FUMARATE 100 MG/1
100 TABLET, FILM COATED ORAL AT BEDTIME
Qty: 30 TABLET | Refills: 0 | Status: SHIPPED | OUTPATIENT
Start: 2020-02-21 | End: 2020-02-28

## 2020-02-24 ENCOUNTER — VIRTUAL VISIT (OUTPATIENT)
Dept: PSYCHIATRY | Facility: CLINIC | Age: 30
End: 2020-02-24
Payer: COMMERCIAL

## 2020-02-24 DIAGNOSIS — Z53.9 ERRONEOUS ENCOUNTER--DISREGARD: Primary | ICD-10-CM

## 2020-02-28 ENCOUNTER — OFFICE VISIT (OUTPATIENT)
Dept: PSYCHIATRY | Facility: CLINIC | Age: 30
End: 2020-02-28
Attending: NURSE PRACTITIONER
Payer: COMMERCIAL

## 2020-02-28 ENCOUNTER — MEDICAL CORRESPONDENCE (OUTPATIENT)
Dept: HEALTH INFORMATION MANAGEMENT | Facility: CLINIC | Age: 30
End: 2020-02-28

## 2020-02-28 ENCOUNTER — TELEPHONE (OUTPATIENT)
Dept: PSYCHIATRY | Facility: CLINIC | Age: 30
End: 2020-02-28

## 2020-02-28 VITALS
DIASTOLIC BLOOD PRESSURE: 85 MMHG | WEIGHT: 212 LBS | HEART RATE: 112 BPM | SYSTOLIC BLOOD PRESSURE: 137 MMHG | BODY MASS INDEX: 33.2 KG/M2

## 2020-02-28 DIAGNOSIS — F25.0 SCHIZOAFFECTIVE DISORDER, BIPOLAR TYPE (H): ICD-10-CM

## 2020-02-28 DIAGNOSIS — F41.1 GENERALIZED ANXIETY DISORDER: ICD-10-CM

## 2020-02-28 DIAGNOSIS — F60.3 BORDERLINE PERSONALITY DISORDER (H): ICD-10-CM

## 2020-02-28 DIAGNOSIS — F25.0 SCHIZOAFFECTIVE DISORDER, BIPOLAR TYPE (H): Primary | ICD-10-CM

## 2020-02-28 PROCEDURE — G0463 HOSPITAL OUTPT CLINIC VISIT: HCPCS | Mod: ZF

## 2020-02-28 RX ORDER — BUSPIRONE HYDROCHLORIDE 15 MG/1
15 TABLET ORAL 3 TIMES DAILY
Qty: 90 TABLET | Refills: 0 | Status: CANCELLED | OUTPATIENT
Start: 2020-02-28

## 2020-02-28 RX ORDER — QUETIAPINE FUMARATE 100 MG/1
150 TABLET, FILM COATED ORAL AT BEDTIME
Qty: 135 TABLET | Refills: 0 | Status: CANCELLED | OUTPATIENT
Start: 2020-02-28

## 2020-02-28 RX ORDER — BUSPIRONE HYDROCHLORIDE 15 MG/1
15 TABLET ORAL 3 TIMES DAILY
Qty: 90 TABLET | Refills: 0 | Status: SHIPPED | OUTPATIENT
Start: 2020-02-28 | End: 2020-03-13

## 2020-02-28 RX ORDER — QUETIAPINE FUMARATE 100 MG/1
TABLET, FILM COATED ORAL
Qty: 135 TABLET | Refills: 0 | Status: SHIPPED | OUTPATIENT
Start: 2020-02-28 | End: 2020-03-13

## 2020-02-28 ASSESSMENT — PAIN SCALES - GENERAL: PAINLEVEL: NO PAIN (0)

## 2020-02-28 NOTE — NURSING NOTE
Chief Complaint   Patient presents with     Recheck Medication     Schizoaffective disorder, bipolar type

## 2020-02-28 NOTE — TELEPHONE ENCOUNTER
Received a call from Alice patient's care coordinator from [facility] (Phone # [phone]).  She indicated that BROOKLYN Cruz, made medication changes today that were outlined in the after visit summary.  They called [facility] and were told that the updated orders are not there yet.    She said that the following changes were made:  - Buspar was increased from 10 mg TID to 15 mg TID    - Seroquel was increased from 100 mg Q HS to 150 mg Q HS, as well as 150 mg BID PRN    Pended orders and routed to provider for review and signature.
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
## 2020-03-02 ENCOUNTER — TELEPHONE (OUTPATIENT)
Dept: PSYCHIATRY | Facility: CLINIC | Age: 30
End: 2020-03-02

## 2020-03-02 DIAGNOSIS — F60.3 BORDERLINE PERSONALITY DISORDER (H): ICD-10-CM

## 2020-03-02 DIAGNOSIS — F25.0 SCHIZOAFFECTIVE DISORDER, BIPOLAR TYPE (H): ICD-10-CM

## 2020-03-02 DIAGNOSIS — F41.1 GENERALIZED ANXIETY DISORDER: ICD-10-CM

## 2020-03-02 DIAGNOSIS — Z79.899 ENCOUNTER FOR LONG-TERM (CURRENT) USE OF MEDICATIONS: ICD-10-CM

## 2020-03-02 LAB — LITHIUM SERPL-SCNC: 0.62 MMOL/L (ref 0.6–1.2)

## 2020-03-02 PROCEDURE — 99000 SPECIMEN HANDLING OFFICE-LAB: CPT | Performed by: FAMILY MEDICINE

## 2020-03-02 PROCEDURE — 80178 ASSAY OF LITHIUM: CPT | Performed by: FAMILY MEDICINE

## 2020-03-02 PROCEDURE — 36415 COLL VENOUS BLD VENIPUNCTURE: CPT | Performed by: FAMILY MEDICINE

## 2020-03-02 PROCEDURE — 80307 DRUG TEST PRSMV CHEM ANLYZR: CPT | Mod: 90 | Performed by: NURSE PRACTITIONER

## 2020-03-02 NOTE — TELEPHONE ENCOUNTER
"Message   Received: Today   Message Contents   Muhumed, Yasmin Patel, Radhika, SRINIVAS Gallegos,     This pt of Ashlyn Fields wanted to talk to you about whether she should take her lithium dosage today or not.     Please give her a call when you get the chance.     Thank you,     Nohelia      Spoke with Ayana and Samantha, Psychiatric Rehab Practitioner. Shared no results on either labs yet. She rescheduled with CM for Tuesday at 3pm. She had a \"fabulous\" weekend, she finished discussion on her divorce with her wife. Samantha notes despite Ayana's report of a great mood, staff are noticing increased depression symptoms and a \"little haven\". Ayana spent some time \"what she calls her box. Frustrated with housing. She now wants to get things done\". Ayana doesn't have an opinion about Samantha's report. Ayana reports she's not slept in a couple days. She reports she's 10 days sober from meth. Ayana denies SI, urges for SIB and agrees she and others are safe and is aware of staff and other supports for urgent help seeking, should the need arise.  "

## 2020-03-04 ENCOUNTER — TELEPHONE (OUTPATIENT)
Dept: PSYCHIATRY | Facility: CLINIC | Age: 30
End: 2020-03-04

## 2020-03-04 DIAGNOSIS — F25.0 SCHIZOAFFECTIVE DISORDER, BIPOLAR TYPE (H): ICD-10-CM

## 2020-03-04 RX ORDER — LITHIUM CARBONATE 300 MG/1
TABLET, FILM COATED, EXTENDED RELEASE ORAL
Qty: 120 TABLET | Refills: 0 | Status: SHIPPED | OUTPATIENT
Start: 2020-03-04 | End: 2020-03-13

## 2020-03-04 NOTE — TELEPHONE ENCOUNTER
Medication Question (lithium )      Ashlyn Fields, BROOKLYN CNP  You 10 minutes ago (2:44 PM)      I spoke with Mariela twice. Nothing left for you on this!    Routing comment      Writer notified by provider that she spoke with [facility] staff directly. Please see providers note for details.

## 2020-03-04 NOTE — TELEPHONE ENCOUNTER
[facility] Call Center    Phone Message    May a detailed message be left on voicemail: yes     Reason for Call: Medication Question or concern regarding medication   Prescription Clarification  Name of Medication: lithium  Prescribing Provider: Donnie   Pharmacy:    [facility] [place] [place] [id number] - [place], [place] - [address] [place] [address] [address]      What on the order needs clarification?   Mariela from [facility], [phone], called regarding pt's low lithium lab result. Pt is requesting lithium be raised, Mariela is calling to check in on all of that. Caller reports she will only be on site until 5 pm today and then not again until Friday, that there is not another nurse in on site tomorrow        Action Taken: Other: routed to Denise Fields    Travel Screening: Not Applicable

## 2020-03-05 ENCOUNTER — TELEPHONE (OUTPATIENT)
Dept: PSYCHIATRY | Facility: CLINIC | Age: 30
End: 2020-03-05

## 2020-03-05 LAB — PAIN DRUG SCR UR W RPTD MEDS: NORMAL

## 2020-03-05 NOTE — TELEPHONE ENCOUNTER
Writer placed a call to [id number] - [place], [place] - [address] [place] [address] [address] and was notified that patient does not have active insurance.     Writer placed a call to  at [phone] to gather additional information. No answer at number provided. LVM, requesting a call back. Clinic number provided.

## 2020-03-05 NOTE — TELEPHONE ENCOUNTER
"Prior Authorization Retail Medication Request    Medication/Dose: QUEtiapine (SEROQUEL) 100 MG tablet- Take one and one half (1.5) tabs at bedtime and twice daily as needed and as tolerated  ICD code (if different than what is on RX): Schizoaffective disorder, bipolar type (H) [F25.0]      Previously Tried and Failed:    - Cymbalta 20-30mg (unknown, 2017 trial)  - Adderall XR 10-20mg (tolerated, some efficacy)  - Xanax 0.5mg (over sedating)  - Abilify 10-15mg (unknown, 2018 trial)  - Lunesta 2-3mg (effective, 2019 trial)  - Prozac 20mg (tolerated, ineffective)  - Intuniv and Tenex 1mg (both effective)  - hydroxyzine HCL and catalina 25-50mg (ineffective, worsened urinary retention)  - lamotrigine 200mg (unknown, 2012 trial)  - Vyvanse 20mg (effective, \"better than Adderall\")  - Ativan 0.5mg (effective)  - Latuda 40mg (2018 trial, unknown)  - melatonin 10mg (ineffective)  - Concerta 18mg (2011 trial, overly sedating)  - Remeron 7.5mg (2018 trial, unsure if effective)  - olanzapine 5-10mg (2019 trial, effective)  - Propranolol 10-20mg (effective, poorly tolerated- may have dropped BP)  - risperidone 0.25-1mg (2017 trial, allergy)  - Strattera 60-80mg (effective, 2016 trial)  - trazodone 50-200mg (ineffective)  - Stelazine 2-6mg (effective)  - Geodon 80mg (limited efficacy)  - Ambien 10mg (effective, possibly parasomnias)  - Prazosin  - Trifluoperazine  - Haldol (allergy, agitating)    Rationalel: Ayana has along standing history of BPD and schizoaffective disorder and is prescribed Seroquel 150 mg at bedtime and 150 mg BID PRN. Patient has been taking this medication since 09/2013 and has been stable on the medication. She is prescribed Seroquel to manager hallucinations which have improved.     Insurance Name:  Not provided   Insurance ID:  Not provided       Pharmacy Information (if different than what is on RX)  Name:  Same   Phone:  Same       "

## 2020-03-06 NOTE — TELEPHONE ENCOUNTER
Faxed written orders directly to Mariela. Provider has also spoken with Mariela to give verbal approval. No further action needed at this time.

## 2020-03-06 NOTE — TELEPHONE ENCOUNTER
SRINIVAS Sierra from patient's group home is returning nurse call.  Caller requested call back to discuss new prescription for lithium.  Patient does not currently have insurance.  Best number to call is [phone].  If she does not answer, ok to leave a detailed message.  She requested a direct call back number, if possible.      Message routed to Psychiatry Nurse Pool

## 2020-03-06 NOTE — TELEPHONE ENCOUNTER
Spoke with [facility] nurse, Mariela. Mariela reports that, because of insurance purposes, Ayana has not been able to get her script for 300mg lithium tablets filled. Therefore, she has been taking 900mg at night using an old supply of 450mg tablets. Today the [facility] staff was able to get Ayana approved for a 30-day supply of the 300mg. As the plan for Ayana is to be taking 1200mg total, Mariela is requesting new orders for their MAR: 300mg tablet in the AM and 900mg (2 450mg tablets) HS. Updated orders can be faxed direcrtly back to Mariela at [phone]    Writer will route this information to the provider to review for accuracy and approval.     Mariela reports that they will have enough of the 450mg tablets to last until insurance issues are resolved.

## 2020-03-10 DIAGNOSIS — M54.50 CHRONIC BILATERAL LOW BACK PAIN WITHOUT SCIATICA: ICD-10-CM

## 2020-03-10 DIAGNOSIS — G89.29 CHRONIC BILATERAL LOW BACK PAIN WITHOUT SCIATICA: ICD-10-CM

## 2020-03-11 NOTE — TELEPHONE ENCOUNTER
"Requested Prescriptions   Pending Prescriptions Disp Refills     naproxen (NAPROSYN) 500 MG tablet [Pharmacy Med Name: NAPROXEN 500MG TAB]  Last Written Prescription Date:  1/17/20  Last Fill Quantity: -,  # refills: -   Last office visit: 2/3/2020 with prescribing provider:  Taqueria jeter  Future Office Visit:     60 tablet      Sig: TAKE 1 TABLET BY MOUTH TWICE A DAY WITH FOOD FOR 2 WEEKS THEN TAKE 1 TABLET BY MOUTH EVERY TWELVE HOURS AS NEEDED       NSAID Medications Passed - 3/10/2020 10:15 AM        Passed - Blood pressure under 140/90 in past 12 months     BP Readings from Last 3 Encounters:   02/03/20 110/70   12/18/19 124/84   12/16/19 106/73                 Passed - Normal ALT on file in past 12 months     Recent Labs   Lab Test 11/07/19  1150   ALT 36             Passed - Normal AST on file in past 12 months     Recent Labs   Lab Test 11/07/19  1150   AST 15             Passed - Recent (12 mo) or future (30 days) visit within the authorizing provider's specialty     Patient has had an office visit with the authorizing provider or a provider within the authorizing providers department within the previous 12 mos or has a future within next 30 days. See \"Patient Info\" tab in inbasket, or \"Choose Columns\" in Meds & Orders section of the refill encounter.              Passed - Patient is age 6-64 years        Passed - Normal CBC on file in past 12 months     Recent Labs   Lab Test 10/23/19  0737   WBC 10.9   RBC 5.15   HGB 14.3   HCT 43.6                    Passed - Medication is active on med list        Passed - No active pregnancy on record        Passed - Normal serum creatinine on file in past 12 months     Recent Labs   Lab Test 02/03/20  1033   CR 0.76             Passed - No positive pregnancy test in past 12 months             "

## 2020-03-13 ENCOUNTER — OFFICE VISIT (OUTPATIENT)
Dept: PSYCHIATRY | Facility: CLINIC | Age: 30
End: 2020-03-13
Attending: NURSE PRACTITIONER
Payer: MEDICAID

## 2020-03-13 VITALS
SYSTOLIC BLOOD PRESSURE: 132 MMHG | DIASTOLIC BLOOD PRESSURE: 95 MMHG | WEIGHT: 208.2 LBS | HEART RATE: 111 BPM | BODY MASS INDEX: 32.61 KG/M2

## 2020-03-13 DIAGNOSIS — F31.11 BIPOLAR 1 DISORDER, MANIC, MILD (H): ICD-10-CM

## 2020-03-13 DIAGNOSIS — F41.1 GENERALIZED ANXIETY DISORDER: ICD-10-CM

## 2020-03-13 DIAGNOSIS — R45.851 SUICIDAL IDEATION: ICD-10-CM

## 2020-03-13 DIAGNOSIS — F25.0 SCHIZOAFFECTIVE DISORDER, BIPOLAR TYPE (H): ICD-10-CM

## 2020-03-13 PROCEDURE — G0463 HOSPITAL OUTPT CLINIC VISIT: HCPCS | Mod: ZF

## 2020-03-13 RX ORDER — LITHIUM CARBONATE 300 MG/1
TABLET, FILM COATED, EXTENDED RELEASE ORAL
Qty: 120 TABLET | Refills: 0 | Status: SHIPPED | OUTPATIENT
Start: 2020-03-13 | End: 2020-05-01

## 2020-03-13 RX ORDER — GABAPENTIN 300 MG/1
600 CAPSULE ORAL 3 TIMES DAILY
Qty: 180 CAPSULE | Refills: 0 | Status: SHIPPED | OUTPATIENT
Start: 2020-03-13 | End: 2020-04-29

## 2020-03-13 RX ORDER — QUETIAPINE FUMARATE 100 MG/1
TABLET, FILM COATED ORAL
Qty: 135 TABLET | Refills: 0 | Status: SHIPPED | OUTPATIENT
Start: 2020-03-13 | End: 2020-04-29

## 2020-03-13 RX ORDER — BENZTROPINE MESYLATE 1 MG/1
1 TABLET ORAL 3 TIMES DAILY PRN
Qty: 90 TABLET | Refills: 0 | Status: SHIPPED | OUTPATIENT
Start: 2020-03-13 | End: 2020-04-08

## 2020-03-13 RX ORDER — BUSPIRONE HYDROCHLORIDE 15 MG/1
15 TABLET ORAL 3 TIMES DAILY
Qty: 90 TABLET | Refills: 0 | Status: SHIPPED | OUTPATIENT
Start: 2020-03-13 | End: 2020-05-01

## 2020-03-13 ASSESSMENT — PAIN SCALES - GENERAL: PAINLEVEL: NO PAIN (0)
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
## 2020-03-14 RX ORDER — NAPROXEN 500 MG/1
TABLET ORAL
Qty: 60 TABLET | Refills: 6 | Status: SHIPPED | OUTPATIENT
Start: 2020-03-14 | End: 2020-09-11

## 2020-03-16 ENCOUNTER — ALLIED HEALTH/NURSE VISIT (OUTPATIENT)
Dept: PSYCHIATRY | Facility: CLINIC | Age: 30
End: 2020-03-16
Attending: PSYCHOLOGIST
Payer: MEDICAID

## 2020-03-16 ENCOUNTER — MYC MEDICAL ADVICE (OUTPATIENT)
Dept: PSYCHIATRY | Facility: CLINIC | Age: 30
End: 2020-03-16

## 2020-03-16 DIAGNOSIS — F25.0 SCHIZOAFFECTIVE DISORDER, BIPOLAR TYPE (H): Primary | ICD-10-CM

## 2020-03-16 DIAGNOSIS — F43.10 PTSD (POST-TRAUMATIC STRESS DISORDER): ICD-10-CM

## 2020-03-16 DIAGNOSIS — F60.3 BORDERLINE PERSONALITY DISORDER (H): ICD-10-CM
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
## 2020-03-24 DIAGNOSIS — F25.0 SCHIZOAFFECTIVE DISORDER, BIPOLAR TYPE (H): ICD-10-CM

## 2020-03-24 RX ORDER — LITHIUM CARBONATE 450 MG
TABLET, EXTENDED RELEASE ORAL
Qty: 56 TABLET | OUTPATIENT
Start: 2020-03-24

## 2020-03-25 NOTE — TELEPHONE ENCOUNTER
Writer placed a call to [facility] in [place] ([phone]) to confirm if patient would be able to receive fluPHENAZine decanoate (PROLIXIN) 25 MG/ML injection every 14 days at their clinic. Also would like to confirm how the process would work in terms of ordering the medication and coverage. Writer was transferred to clinic director and [facility], requesting a call back. Clinic number provided.

## 2020-03-25 NOTE — TELEPHONE ENCOUNTER
Writer received incoming call from Nancy from [facility] in [place] returning this writers call. She confirmed that they are able to administer the injection but will need the order to be sent to [facility] pharmacy. The injection takes about one week to be delivered. The patient will need to schedule an appt with a nurse for an injection. Nancy, requested this writer reach out to [facility] pharmacy to confirmed the medication order process.     Placed a call to [facility] in [place] and spoke with Dianelys who shared that patient will need to establish care with one of their providers who would then co-sign the injection. They are able to complete a phone appt with Dr. Gerald Bland on 3/27 at 9:30 am at the [facility]. The appt was placed on hold of the patient. She also shared that some of the clinic will be closing by next week. Writer was requested to call back at [phone] to make changes to the appt.     Placed a call to [facility] pharmacy and confirmed that the order will need to come for a [facility] clinic. The orders are sent with a hard script from a provider from their clinic.

## 2020-03-27 ENCOUNTER — TELEPHONE (OUTPATIENT)
Dept: FAMILY MEDICINE | Facility: CLINIC | Age: 30
End: 2020-03-27

## 2020-03-27 ENCOUNTER — VIRTUAL VISIT (OUTPATIENT)
Dept: PSYCHIATRY | Facility: CLINIC | Age: 30
End: 2020-03-27
Attending: NURSE PRACTITIONER
Payer: MEDICAID

## 2020-03-27 ENCOUNTER — TELEPHONE (OUTPATIENT)
Dept: FAMILY MEDICINE | Facility: OTHER | Age: 30
End: 2020-03-27

## 2020-03-27 DIAGNOSIS — F19.10 POLYSUBSTANCE ABUSE (H): ICD-10-CM

## 2020-03-27 DIAGNOSIS — F60.3 BORDERLINE PERSONALITY DISORDER (H): ICD-10-CM

## 2020-03-27 DIAGNOSIS — F25.0 SCHIZOAFFECTIVE DISORDER, BIPOLAR TYPE (H): Primary | ICD-10-CM
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
## 2020-03-27 NOTE — TELEPHONE ENCOUNTER
Pt called and was unaware of appointment she is just wanting injection of prolixin she will call her pcp at [facility] and get order sent here for injection   Pamela M Lechevalier LPN

## 2020-03-27 NOTE — TELEPHONE ENCOUNTER
[facility] with Troy Rajni. Pt stated Nurse Cristina told her she could be seen here. Pt is needing antipsychotic shot.  I explained per the current Restrictions on her chart, I was unable to schedule an appointment. Pt asked Cristina to call her and discuss what to do.

## 2020-03-30 ENCOUNTER — VIRTUAL VISIT (OUTPATIENT)
Dept: PSYCHIATRY | Facility: CLINIC | Age: 30
End: 2020-03-30
Attending: PSYCHOLOGIST
Payer: MEDICAID

## 2020-03-30 ENCOUNTER — TELEPHONE (OUTPATIENT)
Dept: PSYCHIATRY | Facility: CLINIC | Age: 30
End: 2020-03-30

## 2020-03-30 ENCOUNTER — VIRTUAL VISIT (OUTPATIENT)
Dept: FAMILY MEDICINE | Facility: OTHER | Age: 30
End: 2020-03-30
Attending: NURSE PRACTITIONER
Payer: MEDICAID

## 2020-03-30 VITALS — BODY MASS INDEX: 31.32 KG/M2 | WEIGHT: 200 LBS

## 2020-03-30 DIAGNOSIS — F31.11 BIPOLAR 1 DISORDER, MANIC, MILD (H): Chronic | ICD-10-CM

## 2020-03-30 DIAGNOSIS — F25.0 SCHIZOAFFECTIVE DISORDER, BIPOLAR TYPE (H): Primary | ICD-10-CM

## 2020-03-30 DIAGNOSIS — F60.3 BORDERLINE PERSONALITY DISORDER (H): ICD-10-CM

## 2020-03-30 DIAGNOSIS — F43.10 PTSD (POST-TRAUMATIC STRESS DISORDER): ICD-10-CM

## 2020-03-30 DIAGNOSIS — F17.200 NICOTINE DEPENDENCE, UNCOMPLICATED, UNSPECIFIED NICOTINE PRODUCT TYPE: Primary | ICD-10-CM

## 2020-03-30 PROCEDURE — 99207 ZZC NO BILLABLE SERVICE THIS VISIT: CPT | Performed by: NURSE PRACTITIONER

## 2020-03-30 RX ORDER — FLUPHENAZINE DECANOATE 25 MG/ML
INJECTION, SOLUTION INTRAMUSCULAR; SUBCUTANEOUS
Status: CANCELLED | OUTPATIENT
Start: 2020-03-30

## 2020-03-30 RX ORDER — FLUPHENAZINE DECANOATE 25 MG/ML
25 INJECTION, SOLUTION INTRAMUSCULAR; SUBCUTANEOUS
Status: DISPENSED | OUTPATIENT
Start: 2020-04-01 | End: 2020-08-18

## 2020-03-30 ASSESSMENT — ANXIETY QUESTIONNAIRES
4. TROUBLE RELAXING: NEARLY EVERY DAY
GAD7 TOTAL SCORE: 15
2. NOT BEING ABLE TO STOP OR CONTROL WORRYING: NEARLY EVERY DAY
1. FEELING NERVOUS, ANXIOUS, OR ON EDGE: NEARLY EVERY DAY
5. BEING SO RESTLESS THAT IT IS HARD TO SIT STILL: NEARLY EVERY DAY
7. FEELING AFRAID AS IF SOMETHING AWFUL MIGHT HAPPEN: NOT AT ALL
3. WORRYING TOO MUCH ABOUT DIFFERENT THINGS: NEARLY EVERY DAY
IF YOU CHECKED OFF ANY PROBLEMS ON THIS QUESTIONNAIRE, HOW DIFFICULT HAVE THESE PROBLEMS MADE IT FOR YOU TO DO YOUR WORK, TAKE CARE OF THINGS AT HOME, OR GET ALONG WITH OTHER PEOPLE: VERY DIFFICULT
6. BECOMING EASILY ANNOYED OR IRRITABLE: NOT AT ALL

## 2020-03-30 ASSESSMENT — PATIENT HEALTH QUESTIONNAIRE - PHQ9: SUM OF ALL RESPONSES TO PHQ QUESTIONS 1-9: 2

## 2020-03-30 ASSESSMENT — PAIN SCALES - GENERAL: PAINLEVEL: NO PAIN (0)

## 2020-03-30 NOTE — TELEPHONE ENCOUNTER
[facility] Call Center    Phone Message    May a detailed message be left on voicemail: yes     Reason for Call: Other: The pt called to provide BROOKLYN Cruz CNP with the following update: She figured out where and when she is getting her injection.     Action Taken: Message routed to:  Other: [facility] [place]    Travel Screening: Not Applicable

## 2020-03-30 NOTE — PROGRESS NOTES
"Subjective     Ayana Prasad is a 29 year old female who is being evaluated via a billable telephone visit.      The patient has been notified of following:     \"This telephone visit will be conducted via a call between you and your physician/provider. We have found that certain health care needs can be provided without the need for a physical exam.  This service lets us provide the care you need with a short phone conversation.  If a prescription is necessary we can send it directly to your pharmacy.  If lab work is needed we can place an order for that and you can then stop by our lab to have the test done at a later time.    If during the course of the call the physician/provider feels a telephone visit is not appropriate, you will not be charged for this service.\"     Physician has received verbal consent for a Telephone Visit from the patient? Yes    Ayana Prasad complains of   Chief Complaint   Patient presents with     Depression     Anxiety       ALLERGIES  Haldol [haloperidol]; Adhesive tape; Percocet [oxycodone-acetaminophen]; Prednisone; Risperidone; Tramadol hcl; Droperidol; and Seroquel [quetiapine]    Depression and Anxiety Follow-Up    How are you doing with your depression since your last visit? No change    How are you doing with your anxiety since your last visit?  Worsened     Are you having other symptoms that might be associated with depression or anxiety? No    Have you had a significant life event? No     Do you have any concerns with your use of alcohol or other drugs? No     She lives in the [facility], but is awaiting placement for rule 25.  Due to COVID-19 guidelines, placement is delayed so for now she is living with her parents in [place].  She will continue every other week video visits with psychiatry.  Her main concern is that she needs an order for prolixin injections every 14 days for the duration she is living in the area.  Her goal is to get back to the [facility].      Overall states " she is doing well, denies new concerns.  She denies thoughts of self-harm, alcohol or other drug intake.      Social History     Tobacco Use     Smoking status: Current Every Day Smoker     Packs/day: 0.50     Types: Cigarettes     Start date: 12/17/2019     Smokeless tobacco: Former User     Types: Chew     Quit date: 12/17/2019   Substance Use Topics     Alcohol use: No     Alcohol/week: 0.0 standard drinks     Drug use: Not Currently     Types: Opiates     Comment: oxy, heroin (smoke)     PHQ 10/11/2019 10/22/2019 3/30/2020   PHQ-9 Total Score 5 18 2   Q9: Thoughts of better off dead/self-harm past 2 weeks Not at all Nearly every day Not at all   Some encounter information is confidential and restricted. Go to Review Flowsheets activity to see all data.     AVA-7 SCORE 10/4/2019 10/22/2019 3/30/2020   Total Score - - -   Total Score 8 17 15   Some encounter information is confidential and restricted. Go to Review Flowsheets activity to see all data.         Suicide Assessment Five-step Evaluation and Treatment (SAFE-T)      How many servings of fruits and vegetables do you eat daily?  2-3    On average, how many sweetened beverages do you drink each day (Examples: soda, juice, sweet tea, etc.  Do NOT count diet or artificially sweetened beverages)?   0    How many days per week do you exercise enough to make your heart beat faster? 3 or less    How many minutes a day do you exercise enough to make your heart beat faster? 9 or less    How many days per week do you miss taking your medication? 0             Patient Active Problem List   Diagnosis     ADHD (attention deficit hyperactivity disorder)     Bipolar 1 disorder, manic, mild     Marijuana abuse     Polysubstance abuse (H)     GERD (gastroesophageal reflux disease)     Tobacco abuse     Abdominal pain, right upper quadrant     Intractable back pain     Optic neuritis     Cauda equina syndrome with neurogenic bladder (H)     Schizoaffective disorder, bipolar  type (H)     PTSD (post-traumatic stress disorder)     Anxiety     Auditory hallucinations     Class 2 obesity due to excess calories in adult     Nephrolithiasis     Cyst of left ovary     Borderline personality disorder (H)     Cannabis dependence (H)     Depression     Episodic mood disorder (H)     History of heroin abuse (H)     Moderate episode of recurrent major depressive disorder (H)     Opioid use disorder, severe, dependence (H)     Substance-induced psychotic disorder with hallucinations (H)     Nausea     Overdose     Suicidal ideation     Bella (H)     Spell of altered consciousness     Urinary retention     Obesity (BMI 35.0-39.9) with comorbidity (H)     Chronic bilateral low back pain without sciatica     Past Surgical History:   Procedure Laterality Date     BACK SURGERY - For Cauda Equina Syndrome  12/24/2016     COLONOSCOPY       COMBINED CYSTOSCOPY, INSERT STENT URETER(S) Left 8/30/2018    Procedure: COMBINED CYSTOSCOPY, INSERT STENT URETER(S);  Cystoscopy With Left Stent Placement;  Surgeon: Kiran Ulrich MD;  Location: [facility]     COMBINED CYSTOSCOPY, RETROGRADES, EXCHANGE STENT URETER(S) Left 10/14/2018    Procedure: COMBINED CYSTOSCOPY, RETROGRADES, EXCHANGE STENT URETER(S);  Cystoscopy and removal of left-sided stent.;  Surgeon: Stiven Olivo MD;  Location:  OR     COMBINED CYSTOSCOPY, RETROGRADES, URETEROSCOPY, INSERT STENT  1/6/2014    Procedure: COMBINED CYSTOSCOPY, RETROGRADES, URETEROSCOPY, INSERT STENT;  Cystyoscopy place left ureteral stent;  Surgeon: Jaun Kimble MD;  Location: [facility]     COMBINED CYSTOSCOPY, RETROGRADES, URETEROSCOPY, INSERT STENT Left 10/23/2018    Procedure: Video cystoscopy, left ureteroscopy with stone extraction;  Surgeon: Bull Mast MD;  Location:  OR     CYSTOSCOPY, URETEROSCOPY, COMBINED Right 9/23/2015    Procedure: COMBINED CYSTOSCOPY, URETEROSCOPY;  Surgeon: ROME Jett MD;  Location: [facility]     ENT SURGERY        ESOPHAGOSCOPY, GASTROSCOPY, DUODENOSCOPY (EGD), COMBINED  4/8/2013    Procedure: COMBINED ESOPHAGOSCOPY, GASTROSCOPY, DUODENOSCOPY (EGD), BIOPSY SINGLE OR MULTIPLE;  Gastroscopy;  Surgeon: Peter Pickard MD;  Location: [facility]     ESOPHAGOSCOPY, GASTROSCOPY, DUODENOSCOPY (EGD), COMBINED Left 10/28/2014    Procedure: COMBINED ESOPHAGOSCOPY, GASTROSCOPY, DUODENOSCOPY (EGD), BIOPSY SINGLE OR MULTIPLE;  Surgeon: Narcisa Ramirez MD;  Location:  OR     ESOPHAGOSCOPY, GASTROSCOPY, DUODENOSCOPY (EGD), COMBINED N/A 12/24/2018    Procedure: COMBINED ESOPHAGOSCOPY, GASTROSCOPY, DUODENOSCOPY (EGD), BIOPSY SINGLE OR MULTIPLE;  Surgeon: Sonu Verduzco MD;  Location: [facility]     LAPAROSCOPIC CHOLECYSTECTOMY  11/20/2014    [facility], Dr. Ramirez     LASER HOLMIUM LITHOTRIPSY URETER(S), INSERT STENT, COMBINED  4/2/2014    Procedure: COMBINED CYSTOSCOPY, URETEROSCOPY, LASER HOLMIUM LITHOTRIPSY URETER(S), INSERT STENT;  Cystoscopy,Left Ureteral Stent Removal,Left Ureteroscopy with Laser Lithotripsy,Left Ureter Stent Placement;  Surgeon: ROME Jett MD;  Location: [facility]     Transurethral stone resection  03/11/2011       Social History     Tobacco Use     Smoking status: Current Every Day Smoker     Packs/day: 0.50     Types: Cigarettes     Start date: 12/17/2019     Smokeless tobacco: Former User     Types: Chew     Quit date: 12/17/2019   Substance Use Topics     Alcohol use: No     Alcohol/week: 0.0 standard drinks     Family History   Problem Relation Age of Onset     Gastrointestinal Disease Father         Crohn's Disease     Cancer Father         Liver Cancer     Other Cancer Father         Liver     Cancer Maternal Grandmother         lympoma     Diabetes Maternal Grandmother      Mental Illness Maternal Grandmother      Other Cancer Maternal Grandmother         Non Hodgkins Lymphoma     Diabetes Maternal Grandfather      Hypertension Maternal Grandfather      Prostate Cancer Maternal Grandfather       Hyperlipidemia Maternal Grandfather      Heart Disease Paternal Grandfather         heart disease     Hypertension Brother      Other Cancer Brother         Esophegeal cancer     Diabetes Brother      Hyperlipidemia Mother      Mental Illness Mother      Anxiety Disorder Mother      Anesthesia Reaction Mother      Hypertension Brother      Other Cancer Brother      Other Cancer Paternal Half-Brother         Esophageal         Current Outpatient Medications   Medication Sig Dispense Refill     benztropine (COGENTIN) 1 MG tablet Take 1 tablet (1 mg) by mouth 3 times daily as needed for agitation 90 tablet 0     busPIRone (BUSPAR) 15 MG tablet Take 1 tablet (15 mg) by mouth 3 times daily 90 tablet 0     fluPHENAZine 1 MG PO tablet Take one tab at bedtime 30 tablet 0     gabapentin (NEURONTIN) 300 MG capsule Take 2 capsules (600 mg) by mouth 3 times daily 180 capsule 0     lithium ER (LITHOBID) 300 MG CR tablet Take one tab (1) each morning and three (3) tabs at bedtime 120 tablet 0     naproxen (NAPROSYN) 500 MG tablet TAKE 1 TABLET BY MOUTH TWICE A DAY WITH FOOD FOR 2 WEEKS THEN TAKE 1 TABLET BY MOUTH EVERY TWELVE HOURS AS NEEDED 60 tablet 6     nicotine (NICORETTE) 2 MG gum Place 1 each (2 mg) inside cheek as needed for smoking cessation 100 each 1     norelgestromin-ethinyl estradiol (ORTHO EVRA) 150-35 MCG/24HR patch Remove old patch and apply new patch onto the skin once a week for 3 weeks (21 days).  May use continuously       QUEtiapine (SEROQUEL) 100 MG tablet Take one and one half (1.5) tabs at bedtime and twice daily as needed and as tolerated 135 tablet 0     fluPHENAZine decanoate (PROLIXIN) 25 MG/ML injection Inject 1 mL (25 mg) into the muscle every 14 days (Patient not taking: Reported on 3/30/2020) 6 mL 5     Allergies   Allergen Reactions     Haldol [Haloperidol] Other (See Comments)     Makes patient very angry and anxious     Adhesive Tape Hives     Percocet [Oxycodone-Acetaminophen] Nausea and  Vomiting     Prednisone Other (See Comments) and Hives     Suicidal ideation     Risperidone Other (See Comments)     Tramadol Hcl Nausea and Vomiting     Droperidol Anxiety     Seroquel [Quetiapine] Palpitations     Spent 2 weeks in the hospital due to having seroquel, caused palpitations and QT prolongation     Recent Labs   Lab Test 02/03/20  1033 11/07/19  1150 10/23/19  0737  06/26/19  2027 06/04/19  1219  11/05/18  1642  10/03/17  1226  05/13/17  0752   A1C  --   --   --   --   --   --   --  5.8*  --  5.8  --   --    LDL  --   --  130*  --   --   --   --   --   --  80  --  68   HDL  --   --  58  --   --   --   --   --   --  50  --  65   TRIG  --   --  211*  --   --   --   --   --   --  246*  --  78   ALT  --  36 24  --  40 25   < >  --    < > 38  --  27   CR 0.76 0.58 0.87   < > 0.87 0.76   < >  --    < > 0.76   < > 0.70   GFRESTIMATED >90 >90 90   < > >90 >90   < >  --    < > >90   < > >90  Non  GFR Calc     GFRESTBLACK >90 >90 >90   < > >90 >90   < >  --    < > >90   < > >90  African American GFR Calc     POTASSIUM 4.2 3.9 3.9   < > 4.2 4.0   < >  --    < > 3.8  --  3.7   TSH  --   --  3.62  --   --  1.06   < >  --   --  2.04  --  1.11    < > = values in this interval not displayed.      BP Readings from Last 3 Encounters:   03/13/20 (!) 132/95   02/28/20 137/85   02/19/20 (!) 126/90    Wt Readings from Last 3 Encounters:   03/30/20 90.7 kg (200 lb)   03/13/20 94.4 kg (208 lb 3.2 oz)   02/28/20 96.2 kg (212 lb)                    Reviewed and updated as needed this visit by Provider         Review of Systems   ROS COMP: Constitutional, HEENT, cardiovascular, pulmonary, gi and gu systems are negative, except as otherwise noted.       Objective   Reported vitals:  Wt 90.7 kg (200 lb)   BMI 31.32 kg/m     healthy, alert and no distress  Psych: Alert and oriented times 3; coherent speech, normal   rate and volume, able to articulate logical thoughts, able   to abstract reason, no tangential  thoughts, no hallucinations   or delusions  Her affect is normal             Assessment/Plan:  1. Bipolar 1 disorder, manic, mild  - fluPHENAZine decanoate (PROLIXIN) injection 25 mg    2. Borderline personality disorder (H)  - fluPHENAZine decanoate (PROLIXIN) injection 25 mg    3. Nicotine dependence, uncomplicated, unspecified nicotine product type  Cessation encouraged       Phone call duration:  15 minutes    Eleonora Bland,   Certified Adult Nurse Practitioner  [phone]

## 2020-03-31 ASSESSMENT — ANXIETY QUESTIONNAIRES: GAD7 TOTAL SCORE: 15

## 2020-03-31 NOTE — TELEPHONE ENCOUNTER
Writer placed a call to patient at [phone] to gather additional information. No answer at number provided. LVM, requesting a call back. Clinic number provided.

## 2020-04-01 ENCOUNTER — ALLIED HEALTH/NURSE VISIT (OUTPATIENT)
Dept: ALLERGY | Facility: OTHER | Age: 30
End: 2020-04-01
Attending: NURSE PRACTITIONER
Payer: COMMERCIAL

## 2020-04-01 ENCOUNTER — TELEPHONE (OUTPATIENT)
Dept: PSYCHIATRY | Facility: CLINIC | Age: 30
End: 2020-04-01

## 2020-04-01 DIAGNOSIS — F60.3 BORDERLINE PERSONALITY DISORDER (H): ICD-10-CM

## 2020-04-01 DIAGNOSIS — F31.11 BIPOLAR 1 DISORDER, MANIC, MILD (H): Primary | ICD-10-CM

## 2020-04-01 PROCEDURE — 96372 THER/PROPH/DIAG INJ SC/IM: CPT

## 2020-04-01 RX ADMIN — FLUPHENAZINE DECANOATE 25 MG: 25 INJECTION, SOLUTION INTRAMUSCULAR; SUBCUTANEOUS at 11:16

## 2020-04-01 NOTE — PROGRESS NOTES
Clinic Administered Medication Documentation      Injectable Medication Documentation    Patient was given Prolixin 25 mg. Prior to medication administration, verified patients identity using patient s name and date of birth. Please see MAR and medication order for additional information. Patient instructed to immediately report any adverse reactions.    Was entire vial of medication used? No, The remainder 4ML of 5ML was saved by hospital pharmacy staff (Tamiko ROMANO)   Vial/Syringe: Multi dose vial--this dose was drawn up by hospital pharmacy staff in a non-patient care area and delivered to clinic for administration.  Expiration Date:  09/30/2020    Was this medication supplied by the patient? No  RAÚL MEJIA LPN

## 2020-04-01 NOTE — TELEPHONE ENCOUNTER
Patient Call     Donnie, Ashlyn Ashley, APRN CNP  You 27 minutes ago (4:47 PM)       She got her injection today in [place]. [place] cl[facility] is staying open. She wants to continue seeing writer virtually since [place] is a 90 min drive. She's scheduled again for an injection on 4/15/2020; notes in Epic. She agrees to discuss plan fruther during virtual visit with me on 4/8/2020 at 10a.    Routing comment      No further action needed by this writer

## 2020-04-01 NOTE — TELEPHONE ENCOUNTER
Writer received incoming call from patient after reading providers response to [employer] message from 3/16 encounter. Patient is wanting to discuss transferring providers due to moving to a different town. She is requesting to discuss this concern directly with provider. Writer agreed to pass this along to provider.

## 2020-04-01 NOTE — PROGRESS NOTES
[facility]    Dialectical Behavior Therapy Program    Continuation of Intake Assessment    Date of Assessment: Mar 30, 2020    Appointment Length: 30 minutes (start: 1:30, stop: 2:00).    Patient Name: Ayana Prasad    Patient MRN: [id number]    Provider: Catrina Red, PhD LP    Sources of Information: Patient, Medical records in [facility]    Telemedicine Visit: The patient's condition can be safely assessed and treated via phone call.      Reason for Telemedicine Visit: Patient unable to travel (due to Covid 19)    Originating Site (Patient Location): Patient's home    Distant Site (Provider Location): Provider Remote Setting    Consent:  The patient/guardian has verbally consented to: the potential risks and benefits of telemedicine (video visit) versus in person care; bill my insurance or make self-payment for services provided; and responsibility for payment of non-covered services.     Mode of Communication:  Audio conversation via phone call    As the provider I attest to compliance with applicable laws and regulations related to telemedicine.    Content: See previous note from 3/16/2020 for psychosocial information. Goal of current session was diagnostic assessment of current symptoms. She arrived to the session 30 minutes late. As such an abridge assessment was administered to determine whether she meets criteria for BPD and would benefit from DBT. Additionally, time was spent assessing her motivation for current treatment, particularly in the context of remote treatment via telephone (as she does not want to use video because she said it would make her more paranoid). She denied having a preference but agreed with current writer that it would make sense to consult with her provider MCKINLEY Khanna.     For assessing Borderline Personality Disorder (BPD), a modified version of the International Personality Disorder Examination module for Borderline  "Personality Disorder was administered. The patient endorsed Pattern of unstable and intense interpersonal relationships characterized by alternating between extremes of idealization and devaluation (Ayana described unstable relationships in her marriage and with her parents, noting that she has periods of ups and downs, or good periods followed by turmoil), Impulsivity in at least two areas that are potentially self-damaging (spending money, doing drugs, speeding tickets, trespassing and \"doing things I shouldn't be doing;\" she noted uncertainty about whether these happen exclusively during manic episodes, but thought that they did not solely happen during haven), Recurrent suicidal behavior, threats, or non-suicidal self-injury (She described 3-4 lifetime suicide attempts and chronic suicide ideation; she denied self-harm), Inappropriate, intense anger (She described that she goes from 0 to 60 very fast and that she will punch holes in walls a few times a year), Transient, stress related paranoia or severe dissociative symptoms (She denied stress related dissociation but previous records suggest that in high stress moments she has stress related paranoia ).      Mental Status Exam                           Ayana Prasad arrived late by 30 min (she said she thought it was scheduled for 1:30) for her intake appointment alone today. Mood:  flat  Affect:  intensity is flat and nonreactive  Speech:  clear, coherent  Psychomotor Behavior:  (not visually observed over the phone)  Throught Process:  logical, linear and goal oriented  Associations:  no loose associations  Thought Content:  no evidence of suicidal ideation or homicidal ideation  Insight:  limited. The patient did actively participate in the interviews.       Risk Assessment                           Risk assessment: Ayana denied current ideation, intent or planning. She noted that she recently had a depressive episode and typically her suicidality emerges " deep into a depressive episode and she often has at least 3 months between her depressive episodes. She reported multiple lifetime suicide attempts, typically through overdosing (3-4), which have required hospitalization. Her most recent attempt was October 2019 via overdose. She denied any current concern about having her pills with her, despite having enough to overdose with and felt confident that she could responsibly dispense them for herself. She denied lifetime self-harm though did report punching walls - that appeared to be an anger response as opposed to intentional self-harm behavior.     Based on risk and protective factors, patient is assessed to be at a moderate level of risk.     Risk factors: Diagnosis of major depression,bipolar disorder, schizophrenia, or schizoaffective disorder, Diagnosis of borderline or antisocial personality disorder, Problematic alcohol or drug use (current or historical) and Prior suicide attempt or aborted suicide attempt  Protective factors: A positive relationship with his/her clinical medical and/or mental health providers  Denied current intent or planning       Summary                    Ayana Prasad is a 29 year old female who was referred by BROOKLYN Cruz, CNS to assess for potential participation in the Adult DBT Program. She was referred due to concerns about chronic suicidality and substance use.     Results from the current evaluation indicate that Ayana Prasad meets criteria for Borderline Personality Disorder. Due to her late arrival to the session, there was not sufficient time to assess other psychiatric disorders. That said, current diagnoses from BROOKLYN Cruz, include: schizoaffective disorder, BPAD type, AVA, BPD, recent meth and cannabis relapse. Based on her symptoms, she would likely benefit from participation in the DBT program. At the current time, though, due to Covid-19, she is living with her parents in [place], [place] (she is hoping to move back  to the [place] in the future) and is reluctant to use video for telehealth. As such, it may be difficult for her to engage in a full-model DBT program including group. Given her ongoing relationship with Ashlyn Fields for mental health support, writer discussed the option of deferring her participation in the DBT program until she could attend in person, if she did not want to use video. Ayana denied having a preference and agreed with writer's offer to consult with BROOKLYN Cruz, to determine whether she believed patient could wait to participate in DBT until she would be able to attend in person.     Diagnostic Impression              Encounter Diagnoses   Name Primary?     Schizoaffective disorder, bipolar type (H) Yes     PTSD (post-traumatic stress disorder)      Borderline personality disorder (H)        Recommendations and Plan                        1) Continue medication management with BROOKLYN Curz, CNS.    2) Start DBT. Writer will consult with BROOKLYN Cruz, about whether she believed patient could wait to participate in DBT until she would be able to attend in person (once restrictions related to Covid-19 get lifted and she returns to live in the [facility]. Writer will discuss this further with patient at next session, to further assess patient's opinion on delaying treatment.

## 2020-04-02 ENCOUNTER — E-VISIT (OUTPATIENT)
Dept: FAMILY MEDICINE | Facility: CLINIC | Age: 30
End: 2020-04-02
Payer: COMMERCIAL

## 2020-04-02 DIAGNOSIS — L70.0 ACNE VULGARIS: Primary | ICD-10-CM

## 2020-04-02 PROCEDURE — 99421 OL DIG E/M SVC 5-10 MIN: CPT | Performed by: NURSE PRACTITIONER

## 2020-04-02 RX ORDER — CLINDAMYCIN PHOSPHATE 11.9 MG/ML
SOLUTION TOPICAL 2 TIMES DAILY
Qty: 60 ML | Refills: 2 | Status: SHIPPED | OUTPATIENT
Start: 2020-04-02 | End: 2020-09-11

## 2020-04-06 ENCOUNTER — VIRTUAL VISIT (OUTPATIENT)
Dept: PSYCHIATRY | Facility: CLINIC | Age: 30
End: 2020-04-06
Attending: PSYCHOLOGIST
Payer: COMMERCIAL

## 2020-04-06 DIAGNOSIS — F60.3 BORDERLINE PERSONALITY DISORDER (H): ICD-10-CM

## 2020-04-06 DIAGNOSIS — F43.10 PTSD (POST-TRAUMATIC STRESS DISORDER): Primary | ICD-10-CM

## 2020-04-08 ENCOUNTER — TRANSFERRED RECORDS (OUTPATIENT)
Dept: HEALTH INFORMATION MANAGEMENT | Facility: CLINIC | Age: 30
End: 2020-04-08

## 2020-04-08 ENCOUNTER — TELEPHONE (OUTPATIENT)
Dept: PSYCHIATRY | Facility: CLINIC | Age: 30
End: 2020-04-08

## 2020-04-08 ENCOUNTER — VIRTUAL VISIT (OUTPATIENT)
Dept: PSYCHIATRY | Facility: CLINIC | Age: 30
End: 2020-04-08
Attending: NURSE PRACTITIONER
Payer: COMMERCIAL

## 2020-04-08 DIAGNOSIS — F25.0 SCHIZOAFFECTIVE DISORDER, BIPOLAR TYPE (H): Primary | ICD-10-CM

## 2020-04-08 DIAGNOSIS — Z79.899 ENCOUNTER FOR LONG-TERM (CURRENT) USE OF MEDICATIONS: ICD-10-CM

## 2020-04-08 ASSESSMENT — PAIN SCALES - GENERAL: PAINLEVEL: NO PAIN (0)

## 2020-04-08 NOTE — TELEPHONE ENCOUNTER
On 4/8/2020, at 9:45, writer called patient at [phone] to confirm Virtual Visit. Writer unable to make contact with patient. Writer left detailed voice message for callback. [phone], left as call back number. Francine Garcia, [facility]
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
## 2020-04-08 NOTE — TELEPHONE ENCOUNTER
injection     Ashlyn Fields, APRN CNP  You 42 minutes ago (3:39 PM)       No dose increase or decrease. We were talking about risks with meds/ non compliance.    Routing comment      Patient notified via [employer]lic[employer]

## 2020-04-09 NOTE — PROGRESS NOTES
[facility]  Psychiatry Clinic    Phone Note          Date: April 6, 2020    Patient name: Ayana Prasad     Patient MRN: [id number]    Provider: Catrina Red, PhD LP    Procedure: Phone Session    Diagnosis: PTSD; Borderline Personality Disorder    Phone call length: 10 minutes (start: 1:00, stop: 1:10 ).    Phone call content: Session was spent discussing whether Ayana would like to participate in DBT at the present moment. Writer had previously consulted with Ayana's psychiatric medication prescriber, BROOKLYN Cruz, CNS, who expressed the opinion that Ayana could wait to participate in DBT until services were resumed in person. Ayana also expressed the desire to wait until she would be able to participate in person. Writer agreed with this assessment and will plan to initiate DBT services with Ayana when provider resumes in-person offerings.      Plan: Writer will contact patient when in-person offerings are resumed, after Covid-19 social distancing rules are removed.

## 2020-04-15 ENCOUNTER — ALLIED HEALTH/NURSE VISIT (OUTPATIENT)
Dept: ALLERGY | Facility: OTHER | Age: 30
End: 2020-04-15
Attending: NURSE PRACTITIONER
Payer: COMMERCIAL

## 2020-04-15 VITALS
BODY MASS INDEX: 31.32 KG/M2 | TEMPERATURE: 98.8 F | SYSTOLIC BLOOD PRESSURE: 125 MMHG | WEIGHT: 200 LBS | DIASTOLIC BLOOD PRESSURE: 87 MMHG | HEART RATE: 112 BPM

## 2020-04-15 DIAGNOSIS — F25.0 SCHIZOAFFECTIVE DISORDER, BIPOLAR TYPE (H): ICD-10-CM

## 2020-04-15 DIAGNOSIS — F60.3 BORDERLINE PERSONALITY DISORDER (H): ICD-10-CM

## 2020-04-15 DIAGNOSIS — Z79.899 ENCOUNTER FOR LONG-TERM (CURRENT) USE OF MEDICATIONS: ICD-10-CM

## 2020-04-15 DIAGNOSIS — F31.11 BIPOLAR 1 DISORDER, MANIC, MILD (H): Primary | ICD-10-CM

## 2020-04-15 LAB
AMPHETAMINES UR QL: NOT DETECTED NG/ML
ANION GAP SERPL CALCULATED.3IONS-SCNC: 9 MMOL/L (ref 3–14)
BARBITURATES UR QL SCN: NOT DETECTED NG/ML
BENZODIAZ UR QL SCN: NOT DETECTED NG/ML
BUN SERPL-MCNC: 12 MG/DL (ref 7–30)
BUPRENORPHINE UR QL: NOT DETECTED NG/ML
CALCIUM SERPL-MCNC: 9.2 MG/DL (ref 8.5–10.1)
CANNABINOIDS UR QL: NOT DETECTED NG/ML
CHLORIDE SERPL-SCNC: 107 MMOL/L (ref 94–109)
CO2 SERPL-SCNC: 20 MMOL/L (ref 20–32)
COCAINE UR QL SCN: NOT DETECTED NG/ML
CREAT SERPL-MCNC: 0.65 MG/DL (ref 0.52–1.04)
D-METHAMPHET UR QL: NOT DETECTED NG/ML
ERYTHROCYTE [DISTWIDTH] IN BLOOD BY AUTOMATED COUNT: 14 % (ref 10–15)
GFR SERPL CREATININE-BSD FRML MDRD: >90 ML/MIN/{1.73_M2}
GLUCOSE SERPL-MCNC: 94 MG/DL (ref 70–99)
HCT VFR BLD AUTO: 40.6 % (ref 35–47)
HGB BLD-MCNC: 13.1 G/DL (ref 11.7–15.7)
LITHIUM SERPL-SCNC: 0.8 MMOL/L (ref 0.6–1.2)
MCH RBC QN AUTO: 28 PG (ref 26.5–33)
MCHC RBC AUTO-ENTMCNC: 32.3 G/DL (ref 31.5–36.5)
MCV RBC AUTO: 87 FL (ref 78–100)
METHADONE UR QL SCN: NOT DETECTED NG/ML
OPIATES UR QL SCN: NOT DETECTED NG/ML
OXYCODONE UR QL SCN: NOT DETECTED NG/ML
PCP UR QL SCN: NOT DETECTED NG/ML
PLATELET # BLD AUTO: 368 10E9/L (ref 150–450)
POTASSIUM SERPL-SCNC: 4.1 MMOL/L (ref 3.4–5.3)
PROPOXYPH UR QL: NOT DETECTED NG/ML
RBC # BLD AUTO: 4.68 10E12/L (ref 3.8–5.2)
SODIUM SERPL-SCNC: 136 MMOL/L (ref 133–144)
TRICYCLICS UR QL SCN: ABNORMAL NG/ML
TSH SERPL DL<=0.005 MIU/L-ACNC: 3.68 MU/L (ref 0.4–4)
WBC # BLD AUTO: 12.1 10E9/L (ref 4–11)

## 2020-04-15 PROCEDURE — 96372 THER/PROPH/DIAG INJ SC/IM: CPT

## 2020-04-15 PROCEDURE — 80178 ASSAY OF LITHIUM: CPT | Mod: ZL | Performed by: NURSE PRACTITIONER

## 2020-04-15 PROCEDURE — 84443 ASSAY THYROID STIM HORMONE: CPT | Mod: ZL | Performed by: NURSE PRACTITIONER

## 2020-04-15 PROCEDURE — 80048 BASIC METABOLIC PNL TOTAL CA: CPT | Mod: ZL | Performed by: NURSE PRACTITIONER

## 2020-04-15 PROCEDURE — 80306 DRUG TEST PRSMV INSTRMNT: CPT | Mod: ZL | Performed by: NURSE PRACTITIONER

## 2020-04-15 PROCEDURE — 36415 COLL VENOUS BLD VENIPUNCTURE: CPT | Mod: ZL | Performed by: NURSE PRACTITIONER

## 2020-04-15 PROCEDURE — 85027 COMPLETE CBC AUTOMATED: CPT | Mod: ZL | Performed by: NURSE PRACTITIONER

## 2020-04-15 RX ADMIN — FLUPHENAZINE DECANOATE 25 MG: 25 INJECTION, SOLUTION INTRAMUSCULAR; SUBCUTANEOUS at 08:57

## 2020-04-15 NOTE — PROGRESS NOTES
Clinic Administered Medication Documentation      Injectable Medication Documentation    Patient was given Prolixin. Prior to medication administration, verified patients identity using patient s name and date of birth. Please see MAR and medication order for additional information. Patient instructed to remain in clinic for 15 minutes and report any adverse reaction to staff immediately .      Was entire vial of medication used? Yes  Vial/Syringe: Syringe--This medication was drawn up by the hospital pharmacy and delivered to she shot room for administration.   Expiration Date:  11/2021  Was this medication supplied by the patient? No     Colleen Enriquez LPN

## 2020-04-20 ENCOUNTER — VIRTUAL VISIT (OUTPATIENT)
Dept: PSYCHIATRY | Facility: CLINIC | Age: 30
End: 2020-04-20
Attending: NURSE PRACTITIONER
Payer: COMMERCIAL

## 2020-04-20 DIAGNOSIS — F25.0 SCHIZOAFFECTIVE DISORDER, BIPOLAR TYPE (H): Primary | ICD-10-CM
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
## 2020-04-21 ENCOUNTER — TELEPHONE (OUTPATIENT)
Dept: ALLERGY | Facility: OTHER | Age: 30
End: 2020-04-21

## 2020-04-21 NOTE — TELEPHONE ENCOUNTER
Patient called in and wanted to make an appointment for antipsychotic injections no one was positive if we were taking this type of appointment, I did schedule patient for 4/29/20 at 10:15.

## 2020-04-23 ENCOUNTER — TELEPHONE (OUTPATIENT)
Dept: PSYCHIATRY | Facility: CLINIC | Age: 30
End: 2020-04-23

## 2020-04-23 NOTE — TELEPHONE ENCOUNTER
"Writer received incoming call from patient and Emily BARTHOLOMEW wanting to share some concerns with provider. Patient shared experiencing paranoia thoughts since the past week. She shared the paranoia started randomly. Denies any recent stressors. She has been experiencing difficulty finding a place to live. Currently resides with parents. She does not feel comfortable sharing these thoughts and feeling with parents. Patient is paranoid of all of her providers who are prescribing medications and feels that medication is a GPS tracker and are mind controlling. She has not been taking medications since the past 2 days. Shared \" I know the thoughts are not real but I can not take my medications.\" Patient is also concerned about a recent commitment. She reports AH but shared \" I have always been able to hear voices.\" Denies any safety concerns at this time. She reports listening to music to distract self. FLORIDA Diaz agreed to reach out to the patient daily for the next few days to check in. Patient is wanting to discuss these concerns with provider. Writer agreed to pass a message along to provider.   "

## 2020-04-23 NOTE — TELEPHONE ENCOUNTER
Writer consulted with provider and received VORB:   1. Provider will reach out to the patient around 5 pm today     Placed a call to patient at [phone] and updated her that provider will reach out around 5 pm. She agreed to call the clinic tomorrow if not connected with provider.

## 2020-04-24 ENCOUNTER — TELEPHONE (OUTPATIENT)
Dept: PSYCHIATRY | Facility: CLINIC | Age: 30
End: 2020-04-24

## 2020-04-24 DIAGNOSIS — F25.0 SCHIZOAFFECTIVE DISORDER, BIPOLAR TYPE (H): Primary | ICD-10-CM

## 2020-04-24 RX ORDER — ESZOPICLONE 1 MG/1
1 TABLET, FILM COATED ORAL
Qty: 30 TABLET | Refills: 0 | Status: SHIPPED | OUTPATIENT
Start: 2020-04-24 | End: 2020-05-20

## 2020-04-24 NOTE — TELEPHONE ENCOUNTER
[facility] Call Center    Phone Message    May a detailed message be left on voicemail: yes     Reason for Call: Other: Pt stated she would like to talk to a nurse about her medication being sent to the pharmacy. The pharmacy hasn't received the lunesta prescriptiion that she's waiting for. She uses [facility] in [place].      Action Taken: Other: [facility]    Travel Screening: Not Applicable

## 2020-04-24 NOTE — TELEPHONE ENCOUNTER
"FW: patient requesting call back   Received: Today   Message Contents   Ashlyn Fields, Rosy Lua CNP, RN; Ashlyn Fields APRN CNP; P P [facility] Pharm[facility]               Spoke with Ayana. She had a rough day.  called her for their scheduled visit and they decided to call me due to Ayana's higher level of stress (living with parents, commitment extended, [place] on). She's [place], wishes she could see her wife but since they're now friends, Ayana called her today for support. Her wife advised her to go inpatient which she declines to do. Her wife doesn't think \"I'm thinking right, she might be right\".     She denies AH tell her the following and believes that meds are mind control, people are following her, the injection has GPS trackers in it. She's coping by getting out on the [place] and doing some target shooting.     Denies current SI/ HI. States she and others are safe around her.     Perceives symptoms are attributed to poor sleep. She agrees to go for a run before 630p to see if that will be enough to tire her. Adherent with lithium, buspar and Seroquel 100mg. None of these induce sleep. No OTC med helps. Lunesta 2mg is effective but she can't get meds before Monday since her pharmacy mails her meds and her insurance restricts her to [facility] and [place]- Del[facility]'s office in [place] and [facility] are between 2-4 hours away from her.     She doesn't want to be in a hospital right now but will go if she needs. Agrees to run and focus on sleep hygiene. She knows I'll call her tomorrow.     Alice- if I prescribe her Lunesta- do you know of other options or a way to get it to her faster?        "

## 2020-04-26 NOTE — TELEPHONE ENCOUNTER
RE: patient requesting call back   Received: 2 days ago   Message Contents   Ashlyn Fields APRN CNP Jacky, Amy Linnea, APRN CNP; Rosy Gil, RN               VIJAYWAYNE     Spoke with Ayana. She's doing alright. She confirms she's taking gabapentin 600mg TID, buspar 15mg TID, lithium 300mg QAM and 900mg at bedtime. She denies safety concerns. AH, which she likes having around her, continue with delusions about monitoring. She went for a run last night. She slept 2.5 hours last night. Agreed to send Lunesta 2mg # 30 into [facility] She will call the pharmacy if she wants to pset up overnight shipping and pay the extra cost.      RE: patient requesting call back   Received: 2 days ago   Message Contents   Ashlyn Fields APRN CNP [place], [place]; Nimisha Blair, [facility]; Rosy Gil, SRINIVAS               No, but thanks. I don't want that door opened. She knows she could pay to have it shipped sooner and is OK with that.    Previous Messages     ----- Message -----   From: Jyothi Hubbard   Sent: 4/24/2020  10:52 AM CDT   To: BROOKLYN Trujillo CNP, *   Subject: RE: patient requesting call back                 Hi ladies,     I gave [facility] a quick call to ask about overnight mailing. They said that they ship same day but it will just depend on how far away the patient lives from [place]. It will likely take 1-2 days business days.     Ashlyn - do you want me check with the insurance company about an override to hopefully get her something sooner than Monday?     Thanks,  Jyothi

## 2020-04-28 DIAGNOSIS — F31.11 BIPOLAR 1 DISORDER, MANIC, MILD (H): ICD-10-CM

## 2020-04-28 DIAGNOSIS — F25.0 SCHIZOAFFECTIVE DISORDER, BIPOLAR TYPE (H): ICD-10-CM

## 2020-04-29 ENCOUNTER — ALLIED HEALTH/NURSE VISIT (OUTPATIENT)
Dept: ALLERGY | Facility: OTHER | Age: 30
End: 2020-04-29
Attending: PODIATRIST
Payer: COMMERCIAL

## 2020-04-29 DIAGNOSIS — F31.11 BIPOLAR 1 DISORDER, MANIC, MILD (H): ICD-10-CM

## 2020-04-29 DIAGNOSIS — F25.0 SCHIZOAFFECTIVE DISORDER, BIPOLAR TYPE (H): ICD-10-CM

## 2020-04-29 DIAGNOSIS — F60.3 BORDERLINE PERSONALITY DISORDER (H): ICD-10-CM

## 2020-04-29 DIAGNOSIS — F31.11 BIPOLAR 1 DISORDER, MANIC, MILD (H): Primary | ICD-10-CM

## 2020-04-29 PROCEDURE — 96372 THER/PROPH/DIAG INJ SC/IM: CPT

## 2020-04-29 RX ORDER — GABAPENTIN 300 MG/1
600 CAPSULE ORAL 3 TIMES DAILY
Qty: 180 CAPSULE | Refills: 0 | Status: SHIPPED | OUTPATIENT
Start: 2020-04-29 | End: 2020-05-13

## 2020-04-29 RX ORDER — QUETIAPINE FUMARATE 100 MG/1
TABLET, FILM COATED ORAL
Qty: 30 TABLET | Refills: 0 | Status: SHIPPED | OUTPATIENT
Start: 2020-04-29 | End: 2020-05-20

## 2020-04-29 RX ORDER — QUETIAPINE FUMARATE 100 MG/1
TABLET, FILM COATED ORAL
Qty: 135 TABLET | Refills: 0 | OUTPATIENT
Start: 2020-04-29

## 2020-04-29 RX ORDER — GABAPENTIN 300 MG/1
CAPSULE ORAL
Qty: 168 CAPSULE | OUTPATIENT
Start: 2020-04-29

## 2020-04-29 RX ADMIN — FLUPHENAZINE DECANOATE 25 MG: 25 INJECTION, SOLUTION INTRAMUSCULAR; SUBCUTANEOUS at 10:29

## 2020-04-29 NOTE — TELEPHONE ENCOUNTER
Senia,     I went into the chart and filled meds there. This is a complicated patient and I appreciate you sending your questions.     Ashlyn

## 2020-04-29 NOTE — PROGRESS NOTES
Clinic Administered Medication Documentation      Injectable Medication Documentation    Patient was given Prolixin. Prior to medication administration, verified patients identity using patient s name and date of birth. Please see MAR and medication order for additional information. Patient instructed to stay in clinic after the injection but patient declined.      Was entire vial of medication used? Yes  Vial/Syringe: Single dose vial  Expiration Date:  5/6/20  Was this medication supplied by the patient? No--the medication was provided by the hospital pharmacy  Usman Enriquez LPN

## 2020-04-29 NOTE — TELEPHONE ENCOUNTER
Medication requested: GABAPENTIN 300MG CAP   Last refilled: 3-30-20  Qty: 168    Medication requested: QUETIAPINE 100MG TAB   TAKE 1 & 1/2 TABLETS BY MOUTH EVERY NIGHT AT BEDTIME AND TWICE A DAY AS NEEDED AND AS TOLERATED   Last refilled: 3-13-20  Qty: 135    Last clinic note: - she chooses to stay off oral Prolixin, PRN Seroquel and Cogentin     Last seen: 4-8-20  RTC: 2 weeks  Cancel: 0  No-show: 0  Next appt: 5-20-20    Refill decision:   Refill pended and routed to the provider for review/determination due to   Gabapentin: Needs  Provider authorization  Quetiapine:? PRN Seroquel    Will send FYI to provider as is outside RTC timeframe.

## 2020-05-05 ENCOUNTER — E-VISIT (OUTPATIENT)
Dept: FAMILY MEDICINE | Facility: CLINIC | Age: 30
End: 2020-05-05
Payer: COMMERCIAL

## 2020-05-05 ENCOUNTER — MEDICAL CORRESPONDENCE (OUTPATIENT)
Dept: HEALTH INFORMATION MANAGEMENT | Facility: CLINIC | Age: 30
End: 2020-05-05

## 2020-05-05 DIAGNOSIS — M54.50 CHRONIC BILATERAL LOW BACK PAIN WITHOUT SCIATICA: Primary | ICD-10-CM

## 2020-05-05 DIAGNOSIS — G89.29 CHRONIC BILATERAL LOW BACK PAIN WITHOUT SCIATICA: Primary | ICD-10-CM

## 2020-05-05 PROCEDURE — 99422 OL DIG E/M SVC 11-20 MIN: CPT | Performed by: NURSE PRACTITIONER

## 2020-05-06 RX ORDER — METHYLPREDNISOLONE 4 MG
TABLET, DOSE PACK ORAL
Qty: 21 TABLET | Refills: 0 | Status: SHIPPED | OUTPATIENT
Start: 2020-05-06 | End: 2020-09-11

## 2020-05-08 ENCOUNTER — MEDICAL CORRESPONDENCE (OUTPATIENT)
Dept: HEALTH INFORMATION MANAGEMENT | Facility: CLINIC | Age: 30
End: 2020-05-08

## 2020-05-11 ENCOUNTER — TELEPHONE (OUTPATIENT)
Dept: PSYCHIATRY | Facility: CLINIC | Age: 30
End: 2020-05-11

## 2020-05-11 NOTE — TELEPHONE ENCOUNTER
On 5/8/2020, 4 faxed pages, including VAL, was received from [facility] requesting a current medication list along with an AIMS Assessment. Unable to obtain AIMS d/t COVID19 and virtual video visits. No in person appointments in Psychiatry. The forms were faxed to R attn: Emily Rodriguez at [phone]. The original copies were sent to scanning.Jaelyn Mascorro LPN

## 2020-05-12 ENCOUNTER — E-VISIT (OUTPATIENT)
Dept: FAMILY MEDICINE | Facility: CLINIC | Age: 30
End: 2020-05-12
Payer: COMMERCIAL

## 2020-05-12 DIAGNOSIS — F31.11 BIPOLAR 1 DISORDER, MANIC, MILD (H): ICD-10-CM

## 2020-05-12 PROCEDURE — 99422 OL DIG E/M SVC 11-20 MIN: CPT | Performed by: NURSE PRACTITIONER

## 2020-05-13 ENCOUNTER — ALLIED HEALTH/NURSE VISIT (OUTPATIENT)
Dept: ALLERGY | Facility: OTHER | Age: 30
End: 2020-05-13
Payer: COMMERCIAL

## 2020-05-13 DIAGNOSIS — F31.11 BIPOLAR 1 DISORDER, MANIC, MILD (H): Primary | ICD-10-CM

## 2020-05-13 PROCEDURE — 96372 THER/PROPH/DIAG INJ SC/IM: CPT

## 2020-05-13 RX ORDER — GABAPENTIN 300 MG/1
900 CAPSULE ORAL 3 TIMES DAILY
Qty: 810 CAPSULE | Refills: 0 | Status: SHIPPED | OUTPATIENT
Start: 2020-05-13 | End: 2020-05-18

## 2020-05-13 RX ADMIN — FLUPHENAZINE DECANOATE 25 MG: 25 INJECTION, SOLUTION INTRAMUSCULAR; SUBCUTANEOUS at 09:11

## 2020-05-13 NOTE — PROGRESS NOTES
Clinic Administered Medication Documentation      Injectable Medication Documentation    Patient was given prolixin. Prior to medication administration, verified patients identity using patient s name and date of birth. Please see MAR and medication order for additional information. Patient instructed to return in 14 days for next injection.      Was entire vial of medication used? Yes  Vial/Syringe: Syringe  Expiration Date:  11/21  Was this medication supplied by the patient? No medication provided by hospital inpatient pharmacy      Amy Black LPN

## 2020-05-13 NOTE — TELEPHONE ENCOUNTER
Provider E-Visit time total (minutes): 15 minutes        MRI from 3/10/19                                                               IMPRESSION:  1. Changes from a laminectomy at L5-S1.  2. No central spinal stenosis. No epidural abscess or hematoma.  3. Bulging disc at L5-S1 causes mild narrowing of the left lateral  recess, though the nerve root does not appear to be compressed.  4. There is mild to moderate neural foraminal narrowing bilaterally at  L5-S1.

## 2020-05-18 ENCOUNTER — MYC MEDICAL ADVICE (OUTPATIENT)
Dept: FAMILY MEDICINE | Facility: CLINIC | Age: 30
End: 2020-05-18

## 2020-05-18 DIAGNOSIS — F31.11 BIPOLAR 1 DISORDER, MANIC, MILD (H): ICD-10-CM

## 2020-05-18 RX ORDER — GABAPENTIN 300 MG/1
900 CAPSULE ORAL 3 TIMES DAILY
Qty: 810 CAPSULE | Refills: 0 | Status: SHIPPED | OUTPATIENT
Start: 2020-05-18 | End: 2020-07-29

## 2020-05-28 ENCOUNTER — VIRTUAL VISIT (OUTPATIENT)
Dept: PSYCHIATRY | Facility: CLINIC | Age: 30
End: 2020-05-28
Attending: NURSE PRACTITIONER
Payer: COMMERCIAL

## 2020-05-28 DIAGNOSIS — F25.0 SCHIZOAFFECTIVE DISORDER, BIPOLAR TYPE (H): Primary | ICD-10-CM
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
## 2020-05-28 NOTE — PATIENT INSTRUCTIONS
Thank you for coming to the PSYCHIATRY CLINIC.    Lab Testing:  If you had lab testing today and your results are reassuring or normal they will be mailed to you or sent through [employer] within 7 days.   If the lab tests need quick action we will call you with the results.  The phone number we will call with results is # [phone] (home) . If this is not the best number please call our clinic and change the number.    Medication Refills:  If you need any refills please call your pharmacy and they will contact us. Our fax number for refills is [phone]. Please allow three business for refill processing.   If you need to  your refill at a new pharmacy, please contact the new pharmacy directly. The new pharmacy will help you get your medications transferred.     Scheduling:  If you have any concerns about today's visit or wish to schedule another appointment please call our office during normal business hours [phone] (8-5:00 M-F)    Contact Us:  Please call [phone] during business hours (8-5:00 M-F).  If after clinic hours, or on the weekend, please call [phone].    Financial Assistance: [phone]  [phone]  Central Billing Office, [facility]: [phone]  [phone]  Medical Records: [phone]    MENTAL HEALTH CRISIS NUMBERS:  [facility]:   [facility]: [phone]  Crisis Residence [facility]: [phone]   Walk-In Counseling Center [facility]: [phone]   COPE  [facility] Mobile Team: [phone] (adults) [phone] (child)     [facility]:   [facility]: [phone]   Walk-in counseling [facility]: [phone]   Walk-in counseling [facility]: [phone]   Crisis Residence [facility]: [phone]   Urgent Care Adult Mental Health: [phone] Mobile team AND  crisis line    Other Crisis Numbers:   National Suicide  Prevention Lifeline: 911-540-AWXH (772-879-9288)  CRISIS TEXT LINE: Text to 611353 for any crisis 24/7; OR www.crisistextline.org   Poison Control Center: 2-954-699-3325  CHILD: [facility] needs assessment team: [phone]   Trans Lifeline: 1-787.715.2719  [facility] Lifeline: 1-148.988.9353    For a medical emergency please call 911 or go to the nearest ER.         _____________________________________________    Again thank you for choosing PSYCHIATRY CLINIC and please let us know how we can best partner with you to improve you and your family's health.    You may be receiving a survey regarding this appointment. We would love to have your feedback, both positive and negative. The survey is done by an external company, so your answers are anonymous.

## 2020-05-29 ENCOUNTER — ALLIED HEALTH/NURSE VISIT (OUTPATIENT)
Dept: ALLERGY | Facility: OTHER | Age: 30
End: 2020-05-29
Attending: NURSE PRACTITIONER
Payer: COMMERCIAL

## 2020-05-29 DIAGNOSIS — F60.3 BORDERLINE PERSONALITY DISORDER (H): ICD-10-CM

## 2020-05-29 DIAGNOSIS — F31.11 BIPOLAR 1 DISORDER, MANIC, MILD (H): Primary | ICD-10-CM

## 2020-05-29 PROCEDURE — 96372 THER/PROPH/DIAG INJ SC/IM: CPT

## 2020-05-29 RX ADMIN — FLUPHENAZINE DECANOATE 25 MG: 25 INJECTION, SOLUTION INTRAMUSCULAR; SUBCUTANEOUS at 08:28

## 2020-05-29 NOTE — PROGRESS NOTES
Clinic Administered Medication Documentation      Injectable Medication Documentation    Patient was given Prolixin. Prior to medication administration, verified patients identity using patient s name and date of birth. Please see MAR and medication order for additional information. Patient instructed to stay in clinic after the injection but patient declined.      Was entire vial of medication used? Yes  Vial/Syringe: Syringe--provided by the pharmacy  Expiration Date:  6/4/20  Was this medication supplied by the patient? No     Colleen Enriquez LPN

## 2020-06-10 ENCOUNTER — MYC MEDICAL ADVICE (OUTPATIENT)
Dept: FAMILY MEDICINE | Facility: CLINIC | Age: 30
End: 2020-06-10

## 2020-06-10 ENCOUNTER — ALLIED HEALTH/NURSE VISIT (OUTPATIENT)
Dept: ALLERGY | Facility: OTHER | Age: 30
End: 2020-06-10
Attending: NURSE PRACTITIONER
Payer: COMMERCIAL

## 2020-06-10 DIAGNOSIS — R45.851 SUICIDAL IDEATION: Primary | ICD-10-CM

## 2020-06-10 PROCEDURE — 96372 THER/PROPH/DIAG INJ SC/IM: CPT

## 2020-06-10 RX ADMIN — FLUPHENAZINE DECANOATE 25 MG: 25 INJECTION, SOLUTION INTRAMUSCULAR; SUBCUTANEOUS at 09:31

## 2020-06-10 NOTE — PROGRESS NOTES
Clinic Administered Medication Documentation      Injectable Medication Documentation    Patient was given Prolixin 25 mg. Prior to medication administration, verified patients identity using patient s name and date of birth. Please see MAR and medication order for additional information.     Was entire vial of medication used? Yes  Vial/Syringe: Single dose vial  Expiration Date:  11/30/21  Was this medication supplied by the patient? No    Right Deltoid    Kat Juarez LPN

## 2020-06-22 ENCOUNTER — VIRTUAL VISIT (OUTPATIENT)
Dept: PSYCHIATRY | Facility: CLINIC | Age: 30
End: 2020-06-22
Attending: NURSE PRACTITIONER
Payer: COMMERCIAL

## 2020-06-22 DIAGNOSIS — F12.20 CANNABIS DEPENDENCE (H): ICD-10-CM

## 2020-06-22 DIAGNOSIS — F25.0 SCHIZOAFFECTIVE DISORDER, BIPOLAR TYPE (H): Primary | ICD-10-CM
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
## 2020-06-23 DIAGNOSIS — F25.0 SCHIZOAFFECTIVE DISORDER, BIPOLAR TYPE (H): ICD-10-CM

## 2020-06-24 ENCOUNTER — ALLIED HEALTH/NURSE VISIT (OUTPATIENT)
Dept: ALLERGY | Facility: OTHER | Age: 30
End: 2020-06-24
Payer: COMMERCIAL

## 2020-06-24 DIAGNOSIS — F31.11 BIPOLAR 1 DISORDER, MANIC, MILD (H): Primary | ICD-10-CM

## 2020-06-24 DIAGNOSIS — F60.3 BORDERLINE PERSONALITY DISORDER (H): ICD-10-CM

## 2020-06-24 PROCEDURE — 96372 THER/PROPH/DIAG INJ SC/IM: CPT

## 2020-06-24 RX ADMIN — FLUPHENAZINE DECANOATE 25 MG: 25 INJECTION, SOLUTION INTRAMUSCULAR; SUBCUTANEOUS at 15:06

## 2020-06-24 NOTE — PROGRESS NOTES
Clinic Administered Medication Documentation      Injectable Medication Documentation    Patient was given prolixin. Prior to medication administration, verified patients identity using patient s name and date of birth. Please see MAR and medication order for additional information. Patient instructed to report any adverse reaction to staff immediately .      Was entire vial of medication used? Yes  Vial/Syringe: Syringe    Was this medication supplied by the patient? No  Left deltoid.    Abbey Esteves LPN

## 2020-06-25 ENCOUNTER — MYC REFILL (OUTPATIENT)
Dept: PSYCHIATRY | Facility: CLINIC | Age: 30
End: 2020-06-25

## 2020-06-25 DIAGNOSIS — F25.0 SCHIZOAFFECTIVE DISORDER, BIPOLAR TYPE (H): ICD-10-CM

## 2020-06-25 RX ORDER — QUETIAPINE FUMARATE 100 MG/1
TABLET, FILM COATED ORAL
Qty: 30 TABLET | Refills: 0 | Status: CANCELLED | OUTPATIENT
Start: 2020-06-25

## 2020-06-25 NOTE — TELEPHONE ENCOUNTER
Last seen: 6/22  RTC: 4 weeks   Cancel: none   No-show: none   Next appt: 7/22    Incoming refill from patient via [facility]     Medication requested: QUEtiapine (SEROQUEL) 100 MG tablet  Directions: Take one tab at bedtime  Qty: 30  Last refilled: 5/20    Routed to provider

## 2020-06-26 RX ORDER — QUETIAPINE FUMARATE 100 MG/1
100 TABLET, FILM COATED ORAL AT BEDTIME
Qty: 30 TABLET | Refills: 0 | Status: SHIPPED | OUTPATIENT
Start: 2020-06-26 | End: 2020-07-14

## 2020-06-26 NOTE — TELEPHONE ENCOUNTER
Medication requested: QUETIAPINE 100MG TAB   Last refilled: 5/26/20  Qty: 30      Last seen: 6/22/20  RTC: 4 weeks  Cancel: 0  No-show: 0  Next appt: 7/22/20    Refill decision:   Refilled for 30 days per protocol.

## 2020-06-30 ENCOUNTER — OFFICE VISIT (OUTPATIENT)
Dept: FAMILY MEDICINE | Facility: OTHER | Age: 30
End: 2020-06-30
Attending: NURSE PRACTITIONER
Payer: COMMERCIAL

## 2020-06-30 ENCOUNTER — TELEPHONE (OUTPATIENT)
Dept: PSYCHIATRY | Facility: CLINIC | Age: 30
End: 2020-06-30

## 2020-06-30 DIAGNOSIS — Z59.89 LIVING IN TEMPORARY QUARTERS: Primary | ICD-10-CM

## 2020-06-30 PROCEDURE — U0003 INFECTIOUS AGENT DETECTION BY NUCLEIC ACID (DNA OR RNA); SEVERE ACUTE RESPIRATORY SYNDROME CORONAVIRUS 2 (SARS-COV-2) (CORONAVIRUS DISEASE [COVID-19]), AMPLIFIED PROBE TECHNIQUE, MAKING USE OF HIGH THROUGHPUT TECHNOLOGIES AS DESCRIBED BY CMS-2020-01-R: HCPCS | Mod: ZL | Performed by: NURSE PRACTITIONER

## 2020-06-30 SDOH — ECONOMIC STABILITY - INCOME SECURITY: OTHER PROBLEMS RELATED TO HOUSING AND ECONOMIC CIRCUMSTANCES: Z59.89

## 2020-06-30 NOTE — TELEPHONE ENCOUNTER
Jarad Diaz,    Just spoke to  and she is going to fax this form to Nurse Triage fax. She needs it done by Monday. I will let you know when I receive it.    Thanks,    Jaelyn

## 2020-06-30 NOTE — TELEPHONE ENCOUNTER
[facility] Call Center    Phone Message    May a detailed message be left on voicemail: yes     Reason for Call: Other:    Patient's  from [facility], , called about paperwork she will be faxing to the clinic.  is helping the patient with an IRTS placement and the paperwork will need to be completed by Ashlyn Fields or her nurse partner.     can be reached at [phone].      Action Taken: Message routed to:  Other: Nursing pool    Travel Screening: Not Applicable

## 2020-07-01 ENCOUNTER — TELEPHONE (OUTPATIENT)
Dept: PSYCHIATRY | Facility: CLINIC | Age: 30
End: 2020-07-01

## 2020-07-01 NOTE — TELEPHONE ENCOUNTER
On 7/1/2020, 7 pages of forms was received from R. The original copies are being worked on in Nurse Triage.Jaelyn Mascorro LPN

## 2020-07-02 ENCOUNTER — TELEPHONE (OUTPATIENT)
Dept: PSYCHIATRY | Facility: CLINIC | Age: 30
End: 2020-07-02

## 2020-07-02 LAB
SARS-COV-2 RNA SPEC QL NAA+PROBE: NOT DETECTED
SPECIMEN SOURCE: NORMAL

## 2020-07-02 NOTE — TELEPHONE ENCOUNTER
----- Message from Mariola Kumar sent at 7/2/2020  3:27 PM CDT -----  Regarding: RE: Prolixin  Hi Jaelyn,    This patient is good to go.    Thanks,    Mariola  ----- Message -----  From: Jaelyn Mascorro LPN  Sent: 7/2/2020   3:19 PM CDT  To: Cam   Subject: Prolixin                                         Hi,    It is really crazy here so if this is duplicate request, I'm sorry!    Ayana wants to start coming back into the clinic for her DOMINGUEZ, Prolixin. Is she still covered, insurance wise, to continue with the injection? Let me know.    Thanks,    Jaelyn

## 2020-07-07 ENCOUNTER — TRANSFERRED RECORDS (OUTPATIENT)
Dept: HEALTH INFORMATION MANAGEMENT | Facility: CLINIC | Age: 30
End: 2020-07-07

## 2020-07-07 NOTE — TELEPHONE ENCOUNTER
Completed/signed forms returned to this writer and faxed to R attn:  at [phone]. The original copies were sent to scanning and copies are held in Psych until scanning is complete.Jaelyn Mascorro LPN

## 2020-07-08 ENCOUNTER — TELEPHONE (OUTPATIENT)
Dept: PSYCHIATRY | Facility: CLINIC | Age: 30
End: 2020-07-08

## 2020-07-08 ENCOUNTER — TELEPHONE (OUTPATIENT)
Dept: FAMILY MEDICINE | Facility: CLINIC | Age: 30
End: 2020-07-08

## 2020-07-08 NOTE — TELEPHONE ENCOUNTER
,  for mental health resources requesting to fax forms to Becki to get patient admitted. Informed Hayes at [place] this week. Is there a fax number at [place] that we can give  to expedite the process? Please advise.    Thank you,    Nancy De La Cruz, [place]

## 2020-07-08 NOTE — TELEPHONE ENCOUNTER
Writer called pt regarding missed injection today and left a DVM with [phone] as CB number to reschedule DOMINGUEZ. Writer also left a DVM with FLORIDA Diaz [phone] for a CB. Writer's direct line given as CB number.Jaelyn Mascorro LPN  .

## 2020-07-09 ENCOUNTER — TELEPHONE (OUTPATIENT)
Dept: PSYCHIATRY | Facility: CLINIC | Age: 30
End: 2020-07-09

## 2020-07-09 NOTE — TELEPHONE ENCOUNTER
Writer called pt regarding missed injection today and left a DVM with [phone] as CB number to reschedule DOMINGUEZ. Writer also left a DVM with FLORIDA Diaz [phone] for a CB. Writer's direct line given as CB number..Darline Mclean LPN

## 2020-07-10 ENCOUNTER — TELEPHONE (OUTPATIENT)
Dept: PSYCHIATRY | Facility: CLINIC | Age: 30
End: 2020-07-10

## 2020-07-10 NOTE — TELEPHONE ENCOUNTER
Writer left a DVM at [phone], pt.'s listed number to contact clinic to schedule her past due injection. Clinic number left as CB.Jaelyn Mascorro LPN

## 2020-07-11 ENCOUNTER — HOSPITAL ENCOUNTER (EMERGENCY)
Facility: CLINIC | Age: 30
Discharge: HOME | End: 2020-07-11
Attending: EMERGENCY MEDICINE | Admitting: EMERGENCY MEDICINE
Payer: COMMERCIAL

## 2020-07-11 VITALS
SYSTOLIC BLOOD PRESSURE: 113 MMHG | RESPIRATION RATE: 16 BRPM | TEMPERATURE: 98.5 F | HEART RATE: 89 BPM | BODY MASS INDEX: 32.89 KG/M2 | OXYGEN SATURATION: 99 % | WEIGHT: 210 LBS | DIASTOLIC BLOOD PRESSURE: 75 MMHG

## 2020-07-11 DIAGNOSIS — F25.0 SCHIZOAFFECTIVE DISORDER, BIPOLAR TYPE (H): ICD-10-CM

## 2020-07-11 DIAGNOSIS — R10.13 ABDOMINAL PAIN, EPIGASTRIC: ICD-10-CM

## 2020-07-11 LAB
ALBUMIN SERPL-MCNC: 4 G/DL (ref 3.4–5)
ALBUMIN UR-MCNC: 10 MG/DL
ALP SERPL-CCNC: 63 U/L (ref 40–150)
ALT SERPL W P-5'-P-CCNC: 31 U/L (ref 0–50)
ANION GAP SERPL CALCULATED.3IONS-SCNC: 8 MMOL/L (ref 3–14)
APPEARANCE UR: CLEAR
AST SERPL W P-5'-P-CCNC: 18 U/L (ref 0–45)
BASOPHILS # BLD AUTO: 0 10E9/L (ref 0–0.2)
BASOPHILS NFR BLD AUTO: 0.3 %
BILIRUB SERPL-MCNC: 0.3 MG/DL (ref 0.2–1.3)
BILIRUB UR QL STRIP: NEGATIVE
BUN SERPL-MCNC: 10 MG/DL (ref 7–30)
CALCIUM SERPL-MCNC: 9.4 MG/DL (ref 8.5–10.1)
CHLORIDE SERPL-SCNC: 109 MMOL/L (ref 94–109)
CO2 SERPL-SCNC: 22 MMOL/L (ref 20–32)
COLOR UR AUTO: YELLOW
CREAT SERPL-MCNC: 0.74 MG/DL (ref 0.52–1.04)
DIFFERENTIAL METHOD BLD: NORMAL
EOSINOPHIL # BLD AUTO: 0.1 10E9/L (ref 0–0.7)
EOSINOPHIL NFR BLD AUTO: 0.7 %
ERYTHROCYTE [DISTWIDTH] IN BLOOD BY AUTOMATED COUNT: 13.9 % (ref 10–15)
GFR SERPL CREATININE-BSD FRML MDRD: >90 ML/MIN/{1.73_M2}
GLUCOSE SERPL-MCNC: 104 MG/DL (ref 70–99)
GLUCOSE UR STRIP-MCNC: NEGATIVE MG/DL
HCG UR QL: NEGATIVE
HCT VFR BLD AUTO: 41.1 % (ref 35–47)
HGB BLD-MCNC: 13.9 G/DL (ref 11.7–15.7)
HGB UR QL STRIP: ABNORMAL
IMM GRANULOCYTES # BLD: 0 10E9/L (ref 0–0.4)
IMM GRANULOCYTES NFR BLD: 0.2 %
KETONES UR STRIP-MCNC: 5 MG/DL
LACTATE BLD-SCNC: 1.1 MMOL/L (ref 0.7–2)
LEUKOCYTE ESTERASE UR QL STRIP: NEGATIVE
LYMPHOCYTES # BLD AUTO: 1.9 10E9/L (ref 0.8–5.3)
LYMPHOCYTES NFR BLD AUTO: 19.6 %
MCH RBC QN AUTO: 28.1 PG (ref 26.5–33)
MCHC RBC AUTO-ENTMCNC: 33.8 G/DL (ref 31.5–36.5)
MCV RBC AUTO: 83 FL (ref 78–100)
MONOCYTES # BLD AUTO: 0.5 10E9/L (ref 0–1.3)
MONOCYTES NFR BLD AUTO: 5.4 %
NEUTROPHILS # BLD AUTO: 7.2 10E9/L (ref 1.6–8.3)
NEUTROPHILS NFR BLD AUTO: 73.8 %
NITRATE UR QL: NEGATIVE
NRBC # BLD AUTO: 0 10*3/UL
NRBC BLD AUTO-RTO: 0 /100
PH UR STRIP: 7.5 PH (ref 5–7)
PLATELET # BLD AUTO: 331 10E9/L (ref 150–450)
POTASSIUM SERPL-SCNC: 3.5 MMOL/L (ref 3.4–5.3)
PROT SERPL-MCNC: 8.3 G/DL (ref 6.8–8.8)
RBC # BLD AUTO: 4.95 10E12/L (ref 3.8–5.2)
RBC #/AREA URNS AUTO: 55 /HPF (ref 0–2)
SODIUM SERPL-SCNC: 139 MMOL/L (ref 133–144)
SOURCE: ABNORMAL
SP GR UR STRIP: 1 (ref 1–1.03)
SQUAMOUS #/AREA URNS AUTO: 1 /HPF (ref 0–1)
UROBILINOGEN UR STRIP-MCNC: NORMAL MG/DL (ref 0–2)
WBC # BLD AUTO: 9.8 10E9/L (ref 4–11)
WBC #/AREA URNS AUTO: 1 /HPF (ref 0–5)

## 2020-07-11 PROCEDURE — 25800030 ZZH RX IP 258 OP 636: Performed by: EMERGENCY MEDICINE

## 2020-07-11 PROCEDURE — 99285 EMERGENCY DEPT VISIT HI MDM: CPT | Mod: Z6 | Performed by: EMERGENCY MEDICINE

## 2020-07-11 PROCEDURE — 96375 TX/PRO/DX INJ NEW DRUG ADDON: CPT | Performed by: EMERGENCY MEDICINE

## 2020-07-11 PROCEDURE — 81025 URINE PREGNANCY TEST: CPT | Performed by: EMERGENCY MEDICINE

## 2020-07-11 PROCEDURE — 25000128 H RX IP 250 OP 636: Performed by: EMERGENCY MEDICINE

## 2020-07-11 PROCEDURE — 96361 HYDRATE IV INFUSION ADD-ON: CPT | Performed by: EMERGENCY MEDICINE

## 2020-07-11 PROCEDURE — 85025 COMPLETE CBC W/AUTO DIFF WBC: CPT | Performed by: EMERGENCY MEDICINE

## 2020-07-11 PROCEDURE — 25000132 ZZH RX MED GY IP 250 OP 250 PS 637: Performed by: EMERGENCY MEDICINE

## 2020-07-11 PROCEDURE — 80053 COMPREHEN METABOLIC PANEL: CPT | Performed by: EMERGENCY MEDICINE

## 2020-07-11 PROCEDURE — 83605 ASSAY OF LACTIC ACID: CPT | Performed by: EMERGENCY MEDICINE

## 2020-07-11 PROCEDURE — 96376 TX/PRO/DX INJ SAME DRUG ADON: CPT | Performed by: EMERGENCY MEDICINE

## 2020-07-11 PROCEDURE — 96372 THER/PROPH/DIAG INJ SC/IM: CPT | Performed by: EMERGENCY MEDICINE

## 2020-07-11 PROCEDURE — 99284 EMERGENCY DEPT VISIT MOD MDM: CPT | Mod: 25 | Performed by: EMERGENCY MEDICINE

## 2020-07-11 PROCEDURE — 96365 THER/PROPH/DIAG IV INF INIT: CPT | Performed by: EMERGENCY MEDICINE

## 2020-07-11 PROCEDURE — 81001 URINALYSIS AUTO W/SCOPE: CPT | Performed by: EMERGENCY MEDICINE

## 2020-07-11 PROCEDURE — 25000125 ZZHC RX 250: Performed by: EMERGENCY MEDICINE

## 2020-07-11 RX ORDER — ALUMINA, MAGNESIA, AND SIMETHICONE 2400; 2400; 240 MG/30ML; MG/30ML; MG/30ML
30 SUSPENSION ORAL EVERY 4 HOURS PRN
Qty: 355 ML | Refills: 0 | Status: SHIPPED | OUTPATIENT
Start: 2020-07-11 | End: 2020-08-10

## 2020-07-11 RX ORDER — ALUMINA, MAGNESIA, AND SIMETHICONE 2400; 2400; 240 MG/30ML; MG/30ML; MG/30ML
15 SUSPENSION ORAL ONCE
Status: COMPLETED | OUTPATIENT
Start: 2020-07-11 | End: 2020-07-11

## 2020-07-11 RX ORDER — ONDANSETRON 2 MG/ML
4 INJECTION INTRAMUSCULAR; INTRAVENOUS EVERY 30 MIN PRN
Status: COMPLETED | OUTPATIENT
Start: 2020-07-11 | End: 2020-07-11

## 2020-07-11 RX ORDER — SODIUM CHLORIDE 9 MG/ML
INJECTION, SOLUTION INTRAVENOUS CONTINUOUS
Status: DISCONTINUED | OUTPATIENT
Start: 2020-07-11 | End: 2020-07-11 | Stop reason: HOSPADM

## 2020-07-11 RX ORDER — OLANZAPINE 10 MG/2ML
5 INJECTION, POWDER, FOR SOLUTION INTRAMUSCULAR ONCE
Status: COMPLETED | OUTPATIENT
Start: 2020-07-11 | End: 2020-07-11

## 2020-07-11 RX ADMIN — ALUMINUM HYDROXIDE, MAGNESIUM HYDROXIDE, AND DIMETHICONE 15 ML: 400; 400; 40 SUSPENSION ORAL at 10:41

## 2020-07-11 RX ADMIN — FAMOTIDINE 20 MG: 20 INJECTION, SOLUTION INTRAVENOUS at 09:57

## 2020-07-11 RX ADMIN — LIDOCAINE HYDROCHLORIDE 30 ML: 20 SOLUTION ORAL; TOPICAL at 07:10

## 2020-07-11 RX ADMIN — ONDANSETRON 4 MG: 2 INJECTION INTRAMUSCULAR; INTRAVENOUS at 07:38

## 2020-07-11 RX ADMIN — OMEPRAZOLE 20 MG: 20 CAPSULE, DELAYED RELEASE ORAL at 09:56

## 2020-07-11 RX ADMIN — METOCLOPRAMIDE HYDROCHLORIDE 10 MG: 5 INJECTION INTRAMUSCULAR; INTRAVENOUS at 08:55

## 2020-07-11 RX ADMIN — SODIUM CHLORIDE: 9 INJECTION, SOLUTION INTRAVENOUS at 07:54

## 2020-07-11 RX ADMIN — OLANZAPINE 5 MG: 10 INJECTION, POWDER, LYOPHILIZED, FOR SOLUTION INTRAMUSCULAR at 09:59

## 2020-07-11 RX ADMIN — ONDANSETRON 4 MG: 2 INJECTION INTRAMUSCULAR; INTRAVENOUS at 07:05

## 2020-07-11 RX ADMIN — SODIUM CHLORIDE 1000 ML: 9 INJECTION, SOLUTION INTRAVENOUS at 07:05

## 2020-07-11 ASSESSMENT — ENCOUNTER SYMPTOMS
HEADACHES: 0
CHILLS: 0
CONFUSION: 0
COUGH: 0
FEVER: 0
ARTHRALGIAS: 0
ABDOMINAL DISTENTION: 0
DYSURIA: 0
CONSTIPATION: 0
COLOR CHANGE: 0
ABDOMINAL PAIN: 1
PALPITATIONS: 0
SHORTNESS OF BREATH: 0
MYALGIAS: 0
FREQUENCY: 0
WEAKNESS: 0
EYE PAIN: 0
DIZZINESS: 0
NECK PAIN: 0
DIFFICULTY URINATING: 0
CHEST TIGHTNESS: 0
SORE THROAT: 0
NAUSEA: 1
DIARRHEA: 0
FATIGUE: 0
BACK PAIN: 0

## 2020-07-11 NOTE — ED PROVIDER NOTES
ED Provider Note  [facility]      History     Chief Complaint   Patient presents with     Abdominal Pain     nausea and vomiting     HPI  Ayana Prasad is a 29 year old female with history of schizoaffective disorder presents emerged department complaint of abdominal pain and vomiting.  States that the pain started gradually last night.  Located in the midepigastric area there is a burning sensation.  Is nonradiating.  Associated with 8 or 9 episodes of vomiting last night.  States she cannot keep anything down.  She is never had anything like this before.  She felt fine yesterday.  She denies any sick contacts, recent travel, unusual foods that could have precipitated this illness.  Does have a surgical history of cholecystectomy.  She denies any fever/chills, respiratory symptoms, dysuria/frequency, melena/hematochezia, diarrhea, appetite change, and has no other medical complaints.    Past Medical History  Past Medical History:   Diagnosis Date     ADHD (attention deficit hyperactivity disorder)      Bipolar 1 disorder      Borderline personality disorder      Cauda equina syndrome      Chronic low back pain      Depression      Depressive disorder      GERD (gastroesophageal reflux disease)      h/o TBI (traumatic brain injury)      Marginal corneal ulcer, left 7/17/2015     Nephrolithiasis      Polysubstance abuse - methamphetamine, hallucinagen, heroin, marijuana     currently in remission     PONV (postoperative nausea and vomiting)      PTSD (post-traumatic stress disorder)      Past Surgical History:   Procedure Laterality Date     BACK SURGERY - For Cauda Equina Syndrome  12/24/2016     COLONOSCOPY       COMBINED CYSTOSCOPY, INSERT STENT URETER(S) Left 8/30/2018    Procedure: COMBINED CYSTOSCOPY, INSERT STENT URETER(S);  Cystoscopy With Left Stent Placement;  Surgeon: Kiran Ulrich MD;  Location: [facility]     COMBINED CYSTOSCOPY, RETROGRADES, EXCHANGE STENT URETER(S)  Left 10/14/2018    Procedure: COMBINED CYSTOSCOPY, RETROGRADES, EXCHANGE STENT URETER(S);  Cystoscopy and removal of left-sided stent.;  Surgeon: Stiven Olivo MD;  Location:  OR     COMBINED CYSTOSCOPY, RETROGRADES, URETEROSCOPY, INSERT STENT  1/6/2014    Procedure: COMBINED CYSTOSCOPY, RETROGRADES, URETEROSCOPY, INSERT STENT;  Cystyoscopy place left ureteral stent;  Surgeon: Jaun Kimble MD;  Location: [facility]     COMBINED CYSTOSCOPY, RETROGRADES, URETEROSCOPY, INSERT STENT Left 10/23/2018    Procedure: Video cystoscopy, left ureteroscopy with stone extraction;  Surgeon: Bull Mast MD;  Location:  OR     CYSTOSCOPY, URETEROSCOPY, COMBINED Right 9/23/2015    Procedure: COMBINED CYSTOSCOPY, URETEROSCOPY;  Surgeon: ROME Jett MD;  Location: [facility]     ENT SURGERY       ESOPHAGOSCOPY, GASTROSCOPY, DUODENOSCOPY (EGD), COMBINED  4/8/2013    Procedure: COMBINED ESOPHAGOSCOPY, GASTROSCOPY, DUODENOSCOPY (EGD), BIOPSY SINGLE OR MULTIPLE;  Gastroscopy;  Surgeon: Peter Pickard MD;  Location: [facility]     ESOPHAGOSCOPY, GASTROSCOPY, DUODENOSCOPY (EGD), COMBINED Left 10/28/2014    Procedure: COMBINED ESOPHAGOSCOPY, GASTROSCOPY, DUODENOSCOPY (EGD), BIOPSY SINGLE OR MULTIPLE;  Surgeon: Narcisa Ramirez MD;  Location:  OR     ESOPHAGOSCOPY, GASTROSCOPY, DUODENOSCOPY (EGD), COMBINED N/A 12/24/2018    Procedure: COMBINED ESOPHAGOSCOPY, GASTROSCOPY, DUODENOSCOPY (EGD), BIOPSY SINGLE OR MULTIPLE;  Surgeon: Sonu Verduzco MD;  Location: [facility]     LAPAROSCOPIC CHOLECYSTECTOMY  11/20/2014    [facility], Dr. Ramirez     LASER HOLMIUM LITHOTRIPSY URETER(S), INSERT STENT, COMBINED  4/2/2014    Procedure: COMBINED CYSTOSCOPY, URETEROSCOPY, LASER HOLMIUM LITHOTRIPSY URETER(S), INSERT STENT;  Cystoscopy,Left Ureteral Stent Removal,Left Ureteroscopy with Laser Lithotripsy,Left Ureter Stent Placement;  Surgeon: ROME Jett MD;  Location: [facility]     Transurethral stone resection  03/11/2011      alum & mag hydroxide-simethicone (MAALOX ADVANCED MAX ST) 400-400-40 MG/5ML SUSP suspension  omeprazole (PRILOSEC) 20 MG DR capsule  busPIRone HCl (BUSPAR) 30 MG tablet  clindamycin (CLEOCIN T) 1 % external solution  eszopiclone (LUNESTA) 1 MG tablet  fluPHENAZine decanoate (PROLIXIN) 25 MG/ML injection  gabapentin (NEURONTIN) 300 MG capsule  lithium ER (LITHOBID) 300 MG CR tablet  methylPREDNISolone (MEDROL DOSEPAK) 4 MG tablet therapy pack  naproxen (NAPROSYN) 500 MG tablet  nicotine (NICORETTE) 2 MG gum  QUEtiapine (SEROQUEL) 100 MG tablet  XULANE 150-35 MCG/24HR patch      Allergies   Allergen Reactions     Haldol [Haloperidol] Other (See Comments)     Makes patient very angry and anxious     Adhesive Tape Hives     Percocet [Oxycodone-Acetaminophen] Nausea and Vomiting     Prednisone Other (See Comments) and Hives     Suicidal ideation     Risperidone Other (See Comments)     Tramadol Hcl Nausea and Vomiting     Droperidol Anxiety     Seroquel [Quetiapine] Palpitations     Spent 2 weeks in the hospital due to having seroquel, caused palpitations and QT prolongation     Past medical history, past surgical history, medications, and allergies were reviewed with the patient. Additional pertinent items: None    Family History  Family History   Problem Relation Age of Onset     Gastrointestinal Disease Father         Crohn's Disease     Cancer Father         Liver Cancer     Other Cancer Father         Liver     Cancer Maternal Grandmother         lympoma     Diabetes Maternal Grandmother      Mental Illness Maternal Grandmother      Other Cancer Maternal Grandmother         Non Hodgkins Lymphoma     Diabetes Maternal Grandfather      Hypertension Maternal Grandfather      Prostate Cancer Maternal Grandfather      Hyperlipidemia Maternal Grandfather      Heart Disease Paternal Grandfather         heart disease     Hypertension Brother      Other Cancer Brother         Esophegeal cancer     Diabetes Brother       Hyperlipidemia Mother      Mental Illness Mother      Anxiety Disorder Mother      Anesthesia Reaction Mother      Hypertension Brother      Other Cancer Brother      Other Cancer Paternal Half-Brother         Esophageal     Family history was reviewed with the patient. Additional pertinent items: None    Social History  Social History     Tobacco Use     Smoking status: Current Every Day Smoker     Packs/day: 0.50     Types: Cigarettes     Start date: 12/17/2019     Smokeless tobacco: Former User     Types: Chew     Quit date: 12/17/2019   Substance Use Topics     Alcohol use: No     Alcohol/week: 0.0 standard drinks     Drug use: Not Currently     Types: Opiates     Comment: oxy, heroin (smoke)      Social history was reviewed with the patient. Additional pertinent items: None    Review of Systems   Constitutional: Negative for chills, fatigue and fever.   HENT: Negative for congestion and sore throat.    Eyes: Negative for pain and visual disturbance.   Respiratory: Negative for cough, chest tightness and shortness of breath.    Cardiovascular: Negative for chest pain and palpitations.   Gastrointestinal: Positive for abdominal pain and nausea. Negative for abdominal distention, constipation and diarrhea.   Genitourinary: Negative for difficulty urinating, dysuria, frequency and urgency.   Musculoskeletal: Negative for arthralgias, back pain, myalgias and neck pain.   Skin: Negative for color change and rash.   Neurological: Negative for dizziness, weakness and headaches.   Psychiatric/Behavioral: Negative for confusion.     A complete review of systems was performed with pertinent positives and negatives noted in the HPI, and all other systems negative.    Physical Exam   BP: 135/88  Pulse: 110  Temp: 97.9  F (36.6  C)  Resp: 16  Weight: 95.3 kg (210 lb)  SpO2: 97 %  Physical Exam  Vitals signs and nursing note reviewed.   Constitutional:       General: She is not in acute distress.     Appearance: Normal  appearance. She is not ill-appearing or toxic-appearing.   HENT:      Head: Normocephalic and atraumatic.      Nose: Nose normal.      Mouth/Throat:      Mouth: Mucous membranes are moist.   Eyes:      Pupils: Pupils are equal, round, and reactive to light.   Neck:      Musculoskeletal: Normal range of motion. No neck rigidity.   Cardiovascular:      Rate and Rhythm: Normal rate.      Pulses: Normal pulses.      Heart sounds: Normal heart sounds.   Pulmonary:      Effort: Pulmonary effort is normal. No respiratory distress.      Breath sounds: Normal breath sounds.   Abdominal:      General: Abdomen is flat. There is no distension.      Tenderness: There is abdominal tenderness.      Comments: Mild tenderness palpation in the midepigastric area.  No guarding rebound or rigidity.  Abdomen is otherwise soft without overlying skin changes.   Musculoskeletal: Normal range of motion.         General: No swelling or deformity.   Skin:     General: Skin is warm.      Capillary Refill: Capillary refill takes less than 2 seconds.   Neurological:      Mental Status: She is alert and oriented to person, place, and time.   Psychiatric:         Mood and Affect: Mood normal.      Comments: Appears anxious         ED Course           Results for orders placed or performed during the hospital encounter of 07/11/20   Comprehensive metabolic panel     Status: Abnormal   Result Value Ref Range    Sodium 139 133 - 144 mmol/L    Potassium 3.5 3.4 - 5.3 mmol/L    Chloride 109 94 - 109 mmol/L    Carbon Dioxide 22 20 - 32 mmol/L    Anion Gap 8 3 - 14 mmol/L    Glucose 104 (H) 70 - 99 mg/dL    Urea Nitrogen 10 7 - 30 mg/dL    Creatinine 0.74 0.52 - 1.04 mg/dL    GFR Estimate >90 >60 mL/min/[1.73_m2]    GFR Estimate If Black >90 >60 mL/min/[1.73_m2]    Calcium 9.4 8.5 - 10.1 mg/dL    Bilirubin Total 0.3 0.2 - 1.3 mg/dL    Albumin 4.0 3.4 - 5.0 g/dL    Protein Total 8.3 6.8 - 8.8 g/dL    Alkaline Phosphatase 63 40 - 150 U/L    ALT 31 0 - 50  U/L    AST 18 0 - 45 U/L   CBC with platelets differential     Status: None   Result Value Ref Range    WBC 9.8 4.0 - 11.0 10e9/L    RBC Count 4.95 3.8 - 5.2 10e12/L    Hemoglobin 13.9 11.7 - 15.7 g/dL    Hematocrit 41.1 35.0 - 47.0 %    MCV 83 78 - 100 fl    MCH 28.1 26.5 - 33.0 pg    MCHC 33.8 31.5 - 36.5 g/dL    RDW 13.9 10.0 - 15.0 %    Platelet Count 331 150 - 450 10e9/L    Diff Method Automated Method     % Neutrophils 73.8 %    % Lymphocytes 19.6 %    % Monocytes 5.4 %    % Eosinophils 0.7 %    % Basophils 0.3 %    % Immature Granulocytes 0.2 %    Nucleated RBCs 0 0 /100    Absolute Neutrophil 7.2 1.6 - 8.3 10e9/L    Absolute Lymphocytes 1.9 0.8 - 5.3 10e9/L    Absolute Monocytes 0.5 0.0 - 1.3 10e9/L    Absolute Eosinophils 0.1 0.0 - 0.7 10e9/L    Absolute Basophils 0.0 0.0 - 0.2 10e9/L    Abs Immature Granulocytes 0.0 0 - 0.4 10e9/L    Absolute Nucleated RBC 0.0    Lactic acid whole blood     Status: None   Result Value Ref Range    Lactic Acid 1.1 0.7 - 2.0 mmol/L   UA reflex to Microscopic and Culture     Status: Abnormal    Specimen: Urine Midstream; Midstream Urine   Result Value Ref Range    Color Urine Yellow     Appearance Urine Clear     Glucose Urine Negative NEG^Negative mg/dL    Bilirubin Urine Negative NEG^Negative    Ketones Urine 5 (A) NEG^Negative mg/dL    Specific Gravity Urine 1.005 1.003 - 1.035    Blood Urine Moderate (A) NEG^Negative    pH Urine 7.5 (H) 5.0 - 7.0 pH    Protein Albumin Urine 10 (A) NEG^Negative mg/dL    Urobilinogen mg/dL Normal 0.0 - 2.0 mg/dL    Nitrite Urine Negative NEG^Negative    Leukocyte Esterase Urine Negative NEG^Negative    Source Midstream Urine     RBC Urine 55 (H) 0 - 2 /HPF    WBC Urine 1 0 - 5 /HPF    Squamous Epithelial /HPF Urine 1 0 - 1 /HPF   HCG qualitative urine     Status: None   Result Value Ref Range    HCG Qual Urine Negative NEG^Negative     Medications   0.9% sodium chloride BOLUS (0 mLs Intravenous Stopped 7/11/20 0805)   ondansetron (ZOFRAN)  injection 4 mg (4 mg Intravenous Given 7/11/20 0738)   lidocaine (XYLOCAINE) 2 % 15 mL, alum & mag hydroxide-simethicone (MAALOX  ES) 15 mL GI Cocktail (30 mLs Oral Given 7/11/20 0710)   metoclopramide (REGLAN) 10 mg in sodium chloride 0.9 % 50 mL infusion (10 mg Intravenous Given 7/11/20 0855)   OLANZapine (zyPREXA) injection 5 mg (5 mg Intramuscular Given 7/11/20 0959)   alum & mag hydroxide-simethicone (MAALOX  ES) suspension 15 mL (15 mLs Oral Given 7/11/20 1041)   famotidine (PEPCID) infusion 20 mg (0 mg Intravenous Stopped 7/11/20 1015)   omeprazole (priLOSEC) CR capsule 20 mg (20 mg Oral Given 7/11/20 0956)        Assessments & Plan (with Medical Decision Making)   On arrival, patient is mildly tachycardic.  She overall appears very anxious.  On physical exam there is some mild tenderness in the midepigastric area.  Abdomen is otherwise soft without any peritoneal signs.    Differential diagnosis includes gastritis/GERD, peptic ulcer disease, pancreatitis, colitis, gastroenteritis, hepatitis, anxiety reaction, UTI/pyelonephritis.  Doubt appendicitis due to location of pain and otherwise soft abdomen.  Plan for GI work-up including CBC, CMP, lipase, urinalysis, hCG, GI cocktail, IV fluids, antiemetics.    No results prior to the end of my shift.  Plan to sign out to the incoming provider.  Disposition at their discretion.    I have reviewed the nursing notes. I have reviewed the findings, diagnosis, plan and need for follow up with the patient.    Discharge Medication List as of 7/11/2020 10:50 AM      START taking these medications    Details   alum & mag hydroxide-simethicone (MAALOX ADVANCED MAX ST) 400-400-40 MG/5ML SUSP suspension Take 30 mLs by mouth every 4 hours as needed for indigestion, Disp-355 mL,R-0, Local Print      omeprazole (PRILOSEC) 20 MG DR capsule Take 1 capsule (20 mg) by mouth daily, Disp-30 capsule,R-0, Local Print             Final diagnoses:   Abdominal pain, epigastric        --  Isaiah Nova DO   Emergency Medicine   [facility], [place], EMERGENCY DEPARTMENT  7/11/2020     Isaiah Nova DO  07/13/20 0437

## 2020-07-11 NOTE — ED AVS SNAPSHOT
[facility], [place], Emergency Department  [address]  [facility] [address]  Phone:  [phone]  Fax:  [phone]                                    Ayana Prasad   MRN: [id number]    Department:  [facility], Emergency Department   Date of Visit:  7/11/2020           After Visit Summary Signature Page    I have received my discharge instructions, and my questions have been answered. I have discussed any challenges I see with this plan with the nurse or doctor.    ..........................................................................................................................................  Patient/Patient Representative Signature      ..........................................................................................................................................  Patient Representative Print Name and Relationship to Patient    ..................................................               ................................................  Date                                   Time    ..........................................................................................................................................  Reviewed by Signature/Title    ...................................................              ..............................................  Date                                               Time          22EPIC Rev 08/18

## 2020-07-13 ENCOUNTER — TELEPHONE (OUTPATIENT)
Dept: PSYCHIATRY | Facility: CLINIC | Age: 30
End: 2020-07-13

## 2020-07-13 NOTE — TELEPHONE ENCOUNTER
Health Call Center    Phone Message    May a detailed message be left on voicemail: yes     Reason for Call:Request for PRN medication    Patient called to request a PRN medication.    Next appointment scheduled July 22 with Ashlyn Fields.  Injection scheduled July 15.      Action Taken: Message routed to:  Other: Psychiatry Nurse Pool

## 2020-07-13 NOTE — TELEPHONE ENCOUNTER
Writer placed a call to patient to gather additional information. Patient reports moving to a [facility] facility on 7/10 and and is experiencing difficulty adjusting to a new place. She reports experiencing anxiety during the day and also at night. Reports sleeping about 3 hours at night. Patient is taking Seroquel 100 mg HS but is requesting to add a PRN dose to help with anxiety. Writer updated patient that provider might want to discuss this further at the next visit. Patient agreed with the plan and requested this writer pass this along to provider for her thought. Writer also reminded patient of the injection appt on 7/15. Patient verbalized understanding. Patient denies any safety concerns at this time.

## 2020-07-13 NOTE — TELEPHONE ENCOUNTER
Writer returned pt phone call who asked if she would be able to receive DOMINGUEZ in her [facility] facility - [facility]. Writer received verbal ok from pt to speak to SRINIVAS Mdasen.  will

## 2020-07-13 NOTE — TELEPHONE ENCOUNTER
----- Message from Katiuska Jean sent at 7/13/2020 12:47 PM CDT -----  Regarding: Pt Call  Hi Ladies,   This pt called asking to speak with one of you about her injection.     Can one of you please call her back.   Please let me know if you have any questions.   Katiuska MUJICA

## 2020-07-14 DIAGNOSIS — F41.1 GENERALIZED ANXIETY DISORDER: ICD-10-CM

## 2020-07-14 DIAGNOSIS — F25.0 SCHIZOAFFECTIVE DISORDER, BIPOLAR TYPE (H): ICD-10-CM

## 2020-07-14 DIAGNOSIS — R45.851 SUICIDAL IDEATION: ICD-10-CM

## 2020-07-14 DIAGNOSIS — F25.0 SCHIZOAFFECTIVE DISORDER, BIPOLAR TYPE (H): Primary | ICD-10-CM

## 2020-07-14 RX ORDER — QUETIAPINE FUMARATE 100 MG/1
TABLET, FILM COATED ORAL
Qty: 30 TABLET | Refills: 0 | OUTPATIENT
Start: 2020-07-14

## 2020-07-14 RX ORDER — FLUPHENAZINE DECANOATE 25 MG/ML
25 INJECTION, SOLUTION INTRAMUSCULAR; SUBCUTANEOUS
Qty: 6 ML | Refills: 0 | Status: SHIPPED | OUTPATIENT
Start: 2020-07-14 | End: 2020-09-14

## 2020-07-14 NOTE — TELEPHONE ENCOUNTER
"Writer notified by Jaelyn Mascorro LPN that patient is completely out Seroquel 100 mg since 7/10 and has been taking Buspar 30 mg BID. Patient was last seen with provider on 6/22 and the plan was to \"she chooses to continue Prolixin dec 25mg [id number] (last 6/10/2020), lithium 300mg QAM, 900mg at bedtime, taper off Buspar 30mg BID (has- take 15mg BID x 14 days then 7.5mg BID x 14 days then stop), continue Seroquel 100mg at bed, stay off Lunesta 1mg at bedtime PRN.\" Per chart review, Seroquel last refilled on 6/26 and therefore patient should have enough meds for another few weeks. Writer agreed to reach out to provider and pharmacy to confirm when the meds were last refilled.     Placed a call to [id number] - [place], [place] - [address] and was notified by pharmacist that patient last refilled a 30 day supply of Seroquel on 6/26 and 30 day supply of Buspar on 5/27. Will route to provider for further recommendations.     Placed a call to patient to gather additional information. She reports taking Seroquel 100 mg at bedtime and also took additional 100 mg PRN dose and therefore is out of medications early. Patient also forgot to taper off Buspar and has been taking 30 mg BID. Writer agreed to pass this along to provider for further recommendations.   "

## 2020-07-14 NOTE — TELEPHONE ENCOUNTER
UPDATE: Prolixin injection will not be given with supply on hand at [facility]. Unable to verify medication. Per Cee RN patient received Prolixin vial in February 2020 from [facility]. Writer informed nurse to call [facility] and have the

## 2020-07-14 NOTE — TELEPHONE ENCOUNTER
Patient will be receiving DOMINGUEZ inhome injections at [facility]. Per Ashlyn Fields ok to fill 1 vial which contains a total of 5 injections per vial. (125mg/25ml) with no refills. Writer e-prescribed script  to [facility] on [address]. Writer will also send current medication list and orders to start Prolixin 25 mg, IM Injection, q14d today d/t patient overdue for injection. Fax sent to [facility] attn: SRINIVAS Madsen at [phone]. [facility]

## 2020-07-15 ENCOUNTER — TELEPHONE (OUTPATIENT)
Dept: PSYCHIATRY | Facility: CLINIC | Age: 30
End: 2020-07-15

## 2020-07-15 RX ORDER — QUETIAPINE FUMARATE 25 MG/1
TABLET, FILM COATED ORAL
Qty: 60 TABLET | Refills: 0 | Status: SHIPPED | OUTPATIENT
Start: 2020-07-15 | End: 2020-09-11

## 2020-07-15 RX ORDER — QUETIAPINE FUMARATE 100 MG/1
100 TABLET, FILM COATED ORAL AT BEDTIME
Qty: 30 TABLET | Refills: 0 | Status: SHIPPED | OUTPATIENT
Start: 2020-07-15 | End: 2020-08-07

## 2020-07-15 RX ORDER — BUSPIRONE HYDROCHLORIDE 30 MG/1
30 TABLET ORAL 2 TIMES DAILY
Qty: 60 TABLET | Refills: 0 | Status: SHIPPED | OUTPATIENT
Start: 2020-07-15 | End: 2020-07-22

## 2020-07-15 RX ORDER — BUSPIRONE HYDROCHLORIDE 30 MG/1
15 TABLET ORAL 2 TIMES DAILY
Qty: 30 TABLET | Refills: 0 | Status: SHIPPED | OUTPATIENT
Start: 2020-07-15 | End: 2020-07-15

## 2020-07-15 NOTE — TELEPHONE ENCOUNTER
update     Ashlyn Fields, APRN CNP  You 42 minutes ago (12:25 PM)       If she wants to retrial Seroquel 12.5-25mg 1-2x daily PRN that's fine with me.    Message text

## 2020-07-15 NOTE — TELEPHONE ENCOUNTER
On 7/13/2020 writer received 4 page VAL from [facility] for verbal communication including records as needed between [facility] and [facility]. Writer also received signed [facility] VAL from patient for verbal communication including records as needed between [facility] and [facility]. The originals were sent to scanning and copies are held in Psych until scanning is complete.Jaelyn Mascorro LPN

## 2020-07-15 NOTE — TELEPHONE ENCOUNTER
Rx Auth     DonnieAshlyn, APRN CNP  You 16 minutes ago (4:09 PM)       Yes 100mg at bedtime and 12.5-25mg 1-2x daily PRN     She should continue buspar without taper for now. WE need to discuss her Seroquel use at RTC especially since she's reported it as an allergy. Will the rx go through if it is two separate rxs?     Message text      Writer placed a call to patient to patient and updated her of the above plan. Updated the patient and nurse  to have patient continue Seroquel 100 HS and add seroquel 12.5 mg- 25 mg 1-2 times daily as needed. Also updated them to continue Buspar 30 mg BID dose until seen in clinic. Patient and nurse both verbalized understanding.

## 2020-07-15 NOTE — TELEPHONE ENCOUNTER
- Meds refilled by provider   - Med tab changed to reflect this   - Patient and nurse notified     Placed a call to [id number] - [place], [place] - [address] and spoke with  and who agreed to discontinue Buspar 15 mg BID dose.     No further action needed by this writer

## 2020-07-20 ENCOUNTER — VIRTUAL VISIT (OUTPATIENT)
Dept: FAMILY MEDICINE | Facility: CLINIC | Age: 30
End: 2020-07-20
Payer: COMMERCIAL

## 2020-07-20 DIAGNOSIS — R11.0 NAUSEA: ICD-10-CM

## 2020-07-20 DIAGNOSIS — N92.0 MENORRHAGIA WITH REGULAR CYCLE: ICD-10-CM

## 2020-07-20 DIAGNOSIS — F12.20 CANNABIS DEPENDENCE (H): ICD-10-CM

## 2020-07-20 DIAGNOSIS — F25.0 SCHIZOAFFECTIVE DISORDER, BIPOLAR TYPE (H): ICD-10-CM

## 2020-07-20 DIAGNOSIS — F19.10 POLYSUBSTANCE ABUSE (H): ICD-10-CM

## 2020-07-20 DIAGNOSIS — M54.9 INTRACTABLE BACK PAIN: ICD-10-CM

## 2020-07-20 DIAGNOSIS — H10.33 ACUTE CONJUNCTIVITIS OF BOTH EYES, UNSPECIFIED ACUTE CONJUNCTIVITIS TYPE: Primary | ICD-10-CM

## 2020-07-20 PROCEDURE — 99214 OFFICE O/P EST MOD 30 MIN: CPT | Mod: 95 | Performed by: NURSE PRACTITIONER

## 2020-07-20 RX ORDER — ONDANSETRON 4 MG/1
4 TABLET, ORALLY DISINTEGRATING ORAL EVERY 8 HOURS PRN
Qty: 30 TABLET | Refills: 0 | Status: SHIPPED | OUTPATIENT
Start: 2020-07-20 | End: 2020-11-03

## 2020-07-20 RX ORDER — TOBRAMYCIN AND DEXAMETHASONE 3; 1 MG/ML; MG/ML
SUSPENSION/ DROPS OPHTHALMIC
Qty: 1 BOTTLE | Refills: 0 | Status: SHIPPED | OUTPATIENT
Start: 2020-07-20 | End: 2020-09-11

## 2020-07-20 RX ORDER — NORELGESTROMIN AND ETHINYL ESTRADIOL 150; 35 UG/D; UG/D
1 PATCH TRANSDERMAL WEEKLY
Qty: 12 PATCH | Refills: 4 | Status: SHIPPED | OUTPATIENT
Start: 2020-07-20 | End: 2021-03-11

## 2020-07-20 ASSESSMENT — ENCOUNTER SYMPTOMS
EYE ITCHING: 1
EYE DISCHARGE: 1
EYE REDNESS: 1
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
## 2020-07-20 NOTE — LETTER
[facility]  [address] [place] [address]  Phone: [phone]    July 20, 2020        Ayana Prasad  [address]  [place] [address]        RE: Ayana Prasad    Discontinue omeprazole.     Acute conjunctivitis of both eyes, unspecified acute conjunctivitis type  Start tobramycin-dexamethasone (TOBRADEX) 0.3-0.1 % ophthalmic suspension; Apply 1 drop in both eye three times per day, for 7 days.  Dispense: 1 Bottle; Refill: 0    2. Menorrhagia with regular cycle  norelgestromin-ethinyl estradiol (XULANE) 150-35 MCG/24HR patch; Place 1 patch onto the skin once a week May use continuously  Dispense: 12 patch; Refill: 4    3. Nausea  Start ondansetron (ZOFRAN-ODT) 4 MG ODT tab; Take 1 tablet (4 mg) by mouth every 8 hours as needed for nausea  Dispense: 30 tablet; Refill: 0      Please contact me for questions or concerns.      Sincerely,        BROOKLYN Cruz CNP

## 2020-07-21 ASSESSMENT — ENCOUNTER SYMPTOMS
CONSTIPATION: 0
SHORTNESS OF BREATH: 0
WHEEZING: 0
LIGHT-HEADEDNESS: 0
NAUSEA: 1
COUGH: 0
RHINORRHEA: 0
SINUS PRESSURE: 0
HEADACHES: 0
BACK PAIN: 1
SORE THROAT: 0
DIZZINESS: 0
DIARRHEA: 0
HALLUCINATIONS: 1
FATIGUE: 0
CHILLS: 0
DIAPHORESIS: 0
FEVER: 0

## 2020-07-22 ENCOUNTER — TELEPHONE (OUTPATIENT)
Dept: PSYCHIATRY | Facility: CLINIC | Age: 30
End: 2020-07-22

## 2020-07-22 ENCOUNTER — VIRTUAL VISIT (OUTPATIENT)
Dept: PSYCHIATRY | Facility: CLINIC | Age: 30
End: 2020-07-22
Attending: NURSE PRACTITIONER
Payer: COMMERCIAL

## 2020-07-22 DIAGNOSIS — F25.0 SCHIZOAFFECTIVE DISORDER, BIPOLAR TYPE (H): ICD-10-CM

## 2020-07-22 DIAGNOSIS — F41.1 GENERALIZED ANXIETY DISORDER: ICD-10-CM

## 2020-07-22 RX ORDER — BUSPIRONE HYDROCHLORIDE 15 MG/1
15 TABLET ORAL 3 TIMES DAILY
Qty: 90 TABLET | Refills: 0 | Status: SHIPPED | OUTPATIENT
Start: 2020-07-22 | End: 2020-09-11

## 2020-07-22 RX ORDER — LITHIUM CARBONATE 300 MG/1
TABLET, FILM COATED, EXTENDED RELEASE ORAL
Qty: 120 TABLET | Refills: 0 | Status: SHIPPED | OUTPATIENT
Start: 2020-07-22 | End: 2020-08-13

## 2020-07-22 NOTE — TELEPHONE ENCOUNTER
Writer notified by provider that she left a message with SANTIAGO BARTHOLOMEW at [facility], Emily Rodriguez ([phone]) to update her regarding patient's current medication and relapse on drug use. Will wait for a call back.
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
## 2020-07-23 ENCOUNTER — TELEPHONE (OUTPATIENT)
Dept: FAMILY MEDICINE | Facility: CLINIC | Age: 30
End: 2020-07-23

## 2020-07-23 DIAGNOSIS — H10.33 ACUTE CONJUNCTIVITIS OF BOTH EYES, UNSPECIFIED ACUTE CONJUNCTIVITIS TYPE: Primary | ICD-10-CM

## 2020-07-23 NOTE — TELEPHONE ENCOUNTER
Received a CoverMyMeds prior authorization request from [facility] for Tobramycin-Dexamethasone 0.3-0.1% Opth Sol    Routed to the [employer] electronic prior authorization team.            aHm Patiño RT (r)  [facility]

## 2020-07-23 NOTE — TELEPHONE ENCOUNTER
PRIOR AUTHORIZATION DENIED    Medication: Tobramycin-Dexamethasone Opth Susp-DENIED    Denial Date: 7/23/2020    Denial Rational: INSURANCE STATES PATIENT MUST TRY/FAIL THREE FORMULARY ALTERNATIVES -         Appeal Information:  IF PATIENT IS UNABLE TO TRY/FAIL ALTERNATIVE(S) PLEASE SUPPLY PA TEAM WITH A LETTER OF MEDICAL NECESSITY WITH CLINICAL REASON.

## 2020-07-23 NOTE — TELEPHONE ENCOUNTER
Central Prior Authorization Team   Phone: [phone]    PA Initiation    Medication: Tobramycin-Dexamethasone Opth Susp  Insurance Company: [employer] [place] - Phone [phone] Fax [phone]  Pharmacy Filling the Rx: [id number] - [place], [place] - [address]  Filling Pharmacy Phone: [phone]  Filling Pharmacy Fax: [phone]  Start Date: 7/23/2020

## 2020-07-24 ENCOUNTER — TELEPHONE (OUTPATIENT)
Dept: PSYCHIATRY | Facility: CLINIC | Age: 30
End: 2020-07-24

## 2020-07-24 RX ORDER — NEOMYCIN SULFATE, POLYMYXIN B SULFATE, AND DEXAMETHASONE 3.5; 10000; 1 MG/G; [USP'U]/G; MG/G
OINTMENT OPHTHALMIC
Qty: 3.5 G | Refills: 0 | Status: SHIPPED | OUTPATIENT
Start: 2020-07-24 | End: 2020-09-11

## 2020-07-24 NOTE — TELEPHONE ENCOUNTER
Routing to the provider, please review the response    If there is any history with any of the three formulary alternatives would need to appeal the determination, use epic template [id number], include specific dates and outcomes. No need to print and sign, just let me know when complete, I will send off to the PA team.    Ham Patiño RT (r)  [facility]

## 2020-07-24 NOTE — TELEPHONE ENCOUNTER
"Writer consulted with provider and was given the following recommendations:  1. Patient will need to be brought into the ED to be evaluated due to commend hallucination and patient has low insight on care  2. Patient has made comments such as \" I will not take my medications unless I am under a commitment.\" Patient is under a commitment till 02/2021  3. Provider also reviewed note from PCP on 7/20 and patient relapsed on \"blow\" and therefore we are recommending a drug test   4. We are concerned for the patient but we have limited options for outpatient and therefore visit to the ED is recommended     Writer placed a call to nurse Meenu at [facility]. The phone was answered by nurse, Sarah. Writer updated Sarah of providers recommendations to bring patient into the ED to be evaluated due to her previous history and also recent relapse. Writer also updated her that patient has not been compliant with medication in the past and has low insight and therefore she will need to be evaluated in the ED. Sarah shared that patient is on hourly checks and she will discuss this with the patient. She will update the patient regarding providers recommendations to come into the ED and will bring her in if patient agreed.    Routed to provider as ARTHUR    "

## 2020-07-24 NOTE — TELEPHONE ENCOUNTER
"Writer received incoming call from [facility]enu at [facility] ([phone]). She is wanting to update provider regarding the complains from the patient. Patient has been experiencing AH that is ongoing but are getting louder over time. She has been experiencing worsening symptoms since the past few days. Tar reports that patient continues to mention that\" her mediations are not helping.\" Patient has started to experience command hallucinations telling her to kill herself. Tar shared that when discussing this with the patient, she shared \" I know they are telling me to kill myself but I would not do that.\" Patient has been on hourly checks throughout the day and night. Patient shard her Prolixin and Seroquel has not been helping. Patient has been complaining of sedation with Seroquel but has continued to take the PRN dose. She also received her Prolixin dose on 7/14 and is due for her next dose on 7/28. Tar has noticed patient's anxiety worsen and has noticed patient shake her leg at all times. When asked to stop shaking her leg, patient is able to control the shaking for few min but then it will start back up. Tar mentioned that patient has scratches on both of her legs due to shaking on tables and chairs. Tar is wanting providers recommendations. Writer agreed to pass this along to provider. Writer also provided Tar with on-call number if patients symptoms worsen over time. Writer also encouraged Tar to bring the patient into the ED if safety is a concern.   "

## 2020-07-29 ENCOUNTER — TELEPHONE (OUTPATIENT)
Dept: PSYCHIATRY | Facility: CLINIC | Age: 30
End: 2020-07-29

## 2020-07-29 ENCOUNTER — TELEPHONE (OUTPATIENT)
Dept: FAMILY MEDICINE | Facility: CLINIC | Age: 30
End: 2020-07-29

## 2020-07-29 ENCOUNTER — VIRTUAL VISIT (OUTPATIENT)
Dept: FAMILY MEDICINE | Facility: CLINIC | Age: 30
End: 2020-07-29
Payer: COMMERCIAL

## 2020-07-29 DIAGNOSIS — F31.11 BIPOLAR 1 DISORDER, MANIC, MILD (H): ICD-10-CM

## 2020-07-29 DIAGNOSIS — F90.2 ATTENTION DEFICIT HYPERACTIVITY DISORDER (ADHD), COMBINED TYPE: ICD-10-CM

## 2020-07-29 DIAGNOSIS — F25.0 SCHIZOAFFECTIVE DISORDER, BIPOLAR TYPE (H): Primary | ICD-10-CM

## 2020-07-29 DIAGNOSIS — F41.9 ANXIETY: ICD-10-CM

## 2020-07-29 DIAGNOSIS — M54.9 INTRACTABLE BACK PAIN: ICD-10-CM

## 2020-07-29 DIAGNOSIS — F12.20 CANNABIS DEPENDENCE (H): ICD-10-CM

## 2020-07-29 PROCEDURE — 99214 OFFICE O/P EST MOD 30 MIN: CPT | Mod: 95 | Performed by: NURSE PRACTITIONER

## 2020-07-29 RX ORDER — GABAPENTIN 300 MG/1
CAPSULE ORAL
Qty: 900 CAPSULE | Refills: 0 | Status: SHIPPED | OUTPATIENT
Start: 2020-07-29 | End: 2020-09-11

## 2020-07-29 ASSESSMENT — ENCOUNTER SYMPTOMS
DYSPHORIC MOOD: 1
HYPERACTIVE: 1
BACK PAIN: 1
NERVOUS/ANXIOUS: 1
SLEEP DISTURBANCE: 1

## 2020-07-29 NOTE — TELEPHONE ENCOUNTER
Received call from nurse Edelmira at [facility], pt just has video visit with Gena, this AM, was not having any sx then, now she is c/o of numbness and tingling  Bilateral on her face,   Not worsening , just has her Seroquel about 20 min ago  Nurse denies any stroke sx,  Reports /94 pulse 104-resp 19 temp 98.6 O 2 sat 95%  Pt color good not pale or sweaty   Able to speak in complete sentences   No one sided weakness\  Pt noted to provider worsening anxiety  At appt  Discussed since pt just had  appt , advise to ob pt and re evaluate her at 1 PM today sooner if need  May be anxiety after visit   Nurse ok with , as pt per her is not in distress  ANTHONY Brooks  Clinic  RN/Faustino Pearson

## 2020-07-29 NOTE — LETTER
[facility]  [address] [facility] [address]  Phone: [phone]    July 29, 2020        Ayana Prasad  [address] [facility] [address]        RE: Ayana Prasad    Patient was seen and treated today at our clinic. Plan to increase gabapentin to 900 mg in AM and PM and then 1200 mg mid day. A new prescription was sent.     Please contact me for questions or concerns.      Sincerely,        BROOKLYN Cruz CNP

## 2020-07-29 NOTE — TELEPHONE ENCOUNTER
Writer placed a call to patient to get an update regarding complains of numbness and tingling to bilateral face. Patient reports the numbness has improved. She was given a glass of water with Benadryl. Patient now reports her numbness and tingling is below her eyes. She agreed to come into the ED if symptoms do no improve in few hours. Writer agreed to call the patient back around 4:30 for an update.

## 2020-07-29 NOTE — TELEPHONE ENCOUNTER
Writer placed a call to patient to get an update. Patient reports the numbness and tingling improved about 1 hours ago after taking a Benadryl. She denies chest pain, dizziness, chest tightness, difficulty swallowing, difficulty breathing or other side effects. Patient is unsure the cause of this. She agreed to call the clinic if symptoms get worsen or come into the ED.    Routed to provider

## 2020-07-29 NOTE — TELEPHONE ENCOUNTER
Writer received incoming call from patient with complains of numbness and tingling to the face. Patient reports the numbness/tingling started after taking morning medication. Patient denies any changes to medications. She reports bilateral numbness and tingling just to the face. Denies any difficulty swallowing or breathing. Patient able to speak with this writer in full sentences. She also complains of visual changes. She reports getting eye drops which usually cause some vision changes. Patient denies any weakness to extremity. Writer asked patient call a nurse at  to assess her symptoms further. Patient is able to stick her tongue out and is WNL. Denies any droopiness to face. Patient is alert and oriented. She denies any changes to face cream or daily food intake. Patient reports taking her PRN Seroquel about 30 min for anxiety. She reports experiencing these symptoms for the past 3 hours. Symptoms are not improving. Writer requested patient be brought into the ED as the symptoms are not improving and patient is complaining of vision changes. Patient shared she will try to drink some water and if symptoms do not improve, she will come into the ED.

## 2020-07-29 NOTE — PROGRESS NOTES
"Ayana Prasad is a 29 year old female who is being evaluated via a billable video visit.      The patient has been notified of following:     \"This video visit will be conducted via a call between you and your physician/provider. We have found that certain health care needs can be provided without the need for an in-person physical exam.  This service lets us provide the care you need with a video conversation.  If a prescription is necessary we can send it directly to your pharmacy.  If lab work is needed we can place an order for that and you can then stop by our lab to have the test done at a later time.    Video visits are billed at different rates depending on your insurance coverage.  Please reach out to your insurance provider with any questions.    If during the course of the call the physician/provider feels a video visit is not appropriate, you will not be charged for this service.\"    Patient has given verbal consent for Video visit? Yes  How would you like to obtain your AVS? MyChart  If you are dropped from the video visit, the video invite should be resent to: Text to cell phone: [phone] Only wants to be connected by text invite. Does not want to use VideoStep.   Will anyone else be joining your video visit? Yes: counselor. How would they like to receive their invitation? Text to cell phone: [phone]      Subjective     Ayana Prasad is a 29 year old female who presents today via video visit for the following health issues:    HPI    Chief Complaint   Patient presents with     Recheck Medication            Video Start Time: 9:17 AM      Current Outpatient Medications   Medication Sig Dispense Refill     busPIRone (BUSPAR) 15 MG tablet Take 1 tablet (15 mg) by mouth 3 times daily 90 tablet 0     clindamycin (CLEOCIN T) 1 % external solution Apply topically 2 times daily 60 mL 2     fluPHENAZine decanoate (PROLIXIN) 25 MG/ML injection Inject 1 mL (25 mg) into the muscle every 14 days 6 mL 0     " gabapentin (NEURONTIN) 300 MG capsule Take 900 mg in AM and 1200 mg mid day. 900 capsule 0     lithium ER (LITHOBID) 300 MG CR tablet Take one tab (1) each morning and three (3) tabs at bedtime 120 tablet 0     neomycin-polymyxin-dexamethasone (MAXITROL) 3.5-16993-5.1 ophthalmic ointment Apply 1 drop in both eye three times per day. Use for 2 days after the redness is gone. Typically 5-7 days total. 3.5 g 0     norelgestromin-ethinyl estradiol (XULANE) 150-35 MCG/24HR patch Place 1 patch onto the skin once a week May use continuously 12 patch 4     ondansetron (ZOFRAN-ODT) 4 MG ODT tab Take 1 tablet (4 mg) by mouth every 8 hours as needed for nausea 30 tablet 0     QUEtiapine (SEROQUEL) 100 MG tablet Take 1 tablet (100 mg) by mouth At Bedtime 30 tablet 0     QUEtiapine (SEROQUEL) 25 MG tablet Take 0.5 -1 tab (12.5 mg-25 mg) 1-2 times daily as needed 60 tablet 0     alum & mag hydroxide-simethicone (MAALOX ADVANCED MAX ST) 400-400-40 MG/5ML SUSP suspension Take 30 mLs by mouth every 4 hours as needed for indigestion (Patient not taking: Reported on 7/20/2020) 355 mL 0     methylPREDNISolone (MEDROL DOSEPAK) 4 MG tablet therapy pack Follow Package Directions (Patient not taking: Reported on 7/20/2020) 21 tablet 0     naproxen (NAPROSYN) 500 MG tablet TAKE 1 TABLET BY MOUTH TWICE A DAY WITH FOOD FOR 2 WEEKS THEN TAKE 1 TABLET BY MOUTH EVERY TWELVE HOURS AS NEEDED (Patient not taking: Reported on 7/20/2020) 60 tablet 6     nicotine (NICORETTE) 2 MG gum Place 1 each (2 mg) inside cheek as needed for smoking cessation (Patient not taking: Reported on 7/20/2020) 100 each 1     omeprazole (PRILOSEC) 20 MG DR capsule Take 1 capsule (20 mg) by mouth daily (Patient not taking: Reported on 7/29/2020) 30 capsule 0     tobramycin-dexamethasone (TOBRADEX) 0.3-0.1 % ophthalmic suspension Apply 1 drop in both eye three times per day, for 7 days. (Patient not taking: Reported on 7/29/2020) 1 Bottle 0     Allergies   Allergen  Reactions     Haldol [Haloperidol] Other (See Comments)     Makes patient very angry and anxious     Adhesive Tape Hives     Percocet [Oxycodone-Acetaminophen] Nausea and Vomiting     Prednisone Other (See Comments) and Hives     Suicidal ideation     Risperidone Other (See Comments)     Tramadol Hcl Nausea and Vomiting     Droperidol Anxiety     Seroquel [Quetiapine] Palpitations     Spent 2 weeks in the hospital due to having seroquel, caused palpitations and QT prolongation       Reviewed and updated as needed this visit by Provider       Video visit completed today due to COVID-19 outbreak.  Requests text message.  Does not want visit through them well.  Does not want visit recorded.  Counselor present at visit.  Was able to view release of information via video.  Plans to send release of information to clinic.  Ayana also gives verbal consent to counselor being present.    Requesting new mental health provider. Feels like is not being listened to. Asked for a dose increase of medication and nothing changed.  Voices have been louder more recently.  Is having more anxiety.  Anxiety is worse in the afternoon.  Reports has not been using any illegal substances.  Had UA last week that was clean.Sleep is better than it has been in some time.  Is sleeping 5-6 hours per night hat is broken up. Loud voices are waking her at night and they are keeping her up at night.     Requesting referral for medical marijuana.  Would like to take it to help with anxiety and to also help with chronic pain from back.  Is currently taking gabapentin for anxiety and lower back pain.  Feels like the gabapentin does help with the pain but the anxiety has been getting gradually worse.  No side effects from gabapentin that is aware of.  Would like to have dose increased.      Would like to get restarted on medication for ADHD.  Feels like attention is good however, has a very difficult time sitting still during groups.  Is not looking for a  stimulant.  Has been on Guafacine, Vyvanse and adderall in the past.      Review of Systems   Musculoskeletal: Positive for back pain (lower, chronic).   Psychiatric/Behavioral: Positive for dysphoric mood and sleep disturbance. Negative for self-injury and suicidal ideas. The patient is nervous/anxious and is hyperactive.                    Objective             Physical Exam  Constitutional:       Appearance: Normal appearance.   HENT:      Nose: No congestion.   Eyes:      General: Lids are normal.   Pulmonary:      Effort: No tachypnea, bradypnea or respiratory distress.   Skin:     Coloration: Skin is not ashen, cyanotic, jaundiced or pale.   Neurological:      Mental Status: She is alert.   Psychiatric:         Mood and Affect: Mood normal.         Speech: Speech normal.         Behavior: Behavior normal.             Assessment & Plan     1. Schizoaffective disorder, bipolar type (H)  Will refer to mental health.  Reinforced importance of remaining on injectable medication due to previous history of noncompliance.  Informed of my concern for increased paranoia and hallucinations with use of marijuana.  Encouraged to seek mental health provider that is certified in dispensing medical marijuana.  Would want psychiatry to be on board with her receiving medical marijuana.  - MENTAL HEALTH REFERRAL  - Adult; Psychiatry; Psychiatry; [facility]: Psychiatry Clinic [phone]; We will contact you to schedule the appointment or please call with any questions    2. Bipolar 1 disorder, manic, mild  Will refer to mental health.  Reinforced importance of remaining on injectable medication due to previous history of noncompliance.  Informed of my concern for increased paranoia and hallucinations with use of marijuana.  Encouraged to seek mental health provider that is certified in dispensing medical marijuana.  Would want psychiatry to be on board with her receiving medical marijuana.  - MENTAL HEALTH REFERRAL  - Adult;  Psychiatry; Psychiatry; [facility]: Psychiatry [facility] [phone]; We will contact you to schedule the appointment or please call with any questions  - gabapentin (NEURONTIN) 300 MG capsule; Take 900 mg in AM and 1200 mg mid day.  Dispense: 900 capsule; Refill: 0    3. Anxiety  Will refer to mental health.  Reinforced importance of remaining on injectable medication due to previous history of noncompliance.  Informed of my concern for increased paranoia and hallucinations with use of marijuana.  Encouraged to seek mental health provider that is certified in dispensing medical marijuana.  Would want psychiatry to be on board with her receiving medical marijuana.  We will plan to increase midday dose of gabapentin to 1200 mg.  Continue 900 mg in a.m. and p.m.  New prescription sent.  Order sent to [facility]  - MENTAL HEALTH REFERRAL  - Adult; Psychiatry; Psychiatry; [facility]: Psychiatry [facility] [phone]; We will contact you to schedule the appointment or please call with any questions  - gabapentin (NEURONTIN) 300 MG capsule; Take 900 mg in AM and 1200 mg mid day.  Dispense: 900 capsule; Refill: 0    4. Intractable back pain  We will increase midday dose  - gabapentin (NEURONTIN) 300 MG capsule; Take 900 mg in AM and 1200 mg mid day.  Dispense: 900 capsule; Refill: 0    5. Cannabis dependence (H)  Currently in remission    6. Attention deficit hyperactivity disorder (ADHD), combined type  We will defer treatment to mental health provider.  - MENTAL HEALTH REFERRAL  - Adult; Psychiatry; Psychiatry; [facility]: Psychiatry [facility] [phone]; We will contact you to schedule the appointment or please call with any questions      No follow-ups on file.    BROOKLYN Cruz CNP  [facility]      Video-Visit Details    Type of service:  Video Visit    Video End Time:9:41 AM    Originating Location (pt. Location): Home    Distant Location (provider location):  [facility]     Platform used for Video  Visit: Doximity    No follow-ups on file.       BROOKLYN Cruz CNP

## 2020-07-30 ENCOUNTER — HOSPITAL ENCOUNTER (EMERGENCY)
Facility: CLINIC | Age: 30
Discharge: HOME | End: 2020-07-30
Attending: EMERGENCY MEDICINE | Admitting: EMERGENCY MEDICINE
Payer: COMMERCIAL

## 2020-07-30 VITALS
WEIGHT: 227 LBS | SYSTOLIC BLOOD PRESSURE: 159 MMHG | OXYGEN SATURATION: 97 % | DIASTOLIC BLOOD PRESSURE: 95 MMHG | BODY MASS INDEX: 35.55 KG/M2 | TEMPERATURE: 98.5 F

## 2020-07-30 DIAGNOSIS — R20.2 FACIAL PARESTHESIA: ICD-10-CM

## 2020-07-30 PROCEDURE — 99282 EMERGENCY DEPT VISIT SF MDM: CPT | Performed by: EMERGENCY MEDICINE

## 2020-07-30 PROCEDURE — 99283 EMERGENCY DEPT VISIT LOW MDM: CPT | Mod: Z6 | Performed by: EMERGENCY MEDICINE

## 2020-07-30 ASSESSMENT — ENCOUNTER SYMPTOMS
CHILLS: 0
VOMITING: 0
DIARRHEA: 0
WEAKNESS: 0
NAUSEA: 0
TROUBLE SWALLOWING: 0
ABDOMINAL PAIN: 0
NUMBNESS: 1
SORE THROAT: 0
HEMATURIA: 0
SPEECH DIFFICULTY: 0
DYSURIA: 0
FEVER: 0
EYE PAIN: 0

## 2020-07-30 NOTE — ED AVS SNAPSHOT
[facility], [place], Emergency Department  [address]  [facility] [address]  Phone:  [phone]  Fax:  [phone]                                    Ayana Prasad   MRN: [id number]    Department:  [facility], Emergency Department   Date of Visit:  7/30/2020           After Visit Summary Signature Page    I have received my discharge instructions, and my questions have been answered. I have discussed any challenges I see with this plan with the nurse or doctor.    ..........................................................................................................................................  Patient/Patient Representative Signature      ..........................................................................................................................................  Patient Representative Print Name and Relationship to Patient    ..................................................               ................................................  Date                                   Time    ..........................................................................................................................................  Reviewed by Signature/Title    ...................................................              ..............................................  Date                                               Time          22EPIC Rev 08/18

## 2020-07-31 NOTE — DISCHARGE INSTRUCTIONS
Please make an appointment to follow up with your psychiatrist and family physician and neurology Clinic (phone: [phone]) as soon as possible as needed.

## 2020-07-31 NOTE — ED PROVIDER NOTES
[facility] EMERGENCY DEPARTMENT ([facility])     July 30, 2020    History     Chief Complaint   Patient presents with     Numbness     Pt reports entire face is numb, started yesterday.      The history is provided by the patient and medical records.     Ayana Prasad is a 29 year old female with a history of schizoaffective disorder, bipolar 1 disorder who presents to the Emergency Department with facial numbness. Patient reports her entire face has been numb since yesterday. She states she has not had anything like this before.  It feels tingling and she reports that it is bilateral. patient denies eye pain, trouble swallowing, slurred speech, fever, chills, ear pain, sore throat, weakness, facial droop or trouble closing her eye, abdominal pain, nausea, vomiting, diarrhea, dysuria, hematuria, or other numbness or tingling.  She also denies any extremity weakness.    PAST MEDICAL HISTORY:   Past Medical History:   Diagnosis Date     ADHD (attention deficit hyperactivity disorder)      Bipolar 1 disorder      Borderline personality disorder      Cauda equina syndrome      Chronic low back pain      Depression      Depressive disorder      GERD (gastroesophageal reflux disease)      h/o TBI (traumatic brain injury)      Marginal corneal ulcer, left 7/17/2015     Nephrolithiasis      Polysubstance abuse - methamphetamine, hallucinagen, heroin, marijuana     currently in remission     PONV (postoperative nausea and vomiting)      PTSD (post-traumatic stress disorder)        PAST SURGICAL HISTORY:   Past Surgical History:   Procedure Laterality Date     BACK SURGERY - For Cauda Equina Syndrome  12/24/2016     COLONOSCOPY       COMBINED CYSTOSCOPY, INSERT STENT URETER(S) Left 8/30/2018    Procedure: COMBINED CYSTOSCOPY, INSERT STENT URETER(S);  Cystoscopy With Left Stent Placement;  Surgeon: Kiran Ulrich MD;  Location: [facility] OR     COMBINED CYSTOSCOPY, RETROGRADES, EXCHANGE STENT URETER(S) Left  10/14/2018    Procedure: COMBINED CYSTOSCOPY, RETROGRADES, EXCHANGE STENT URETER(S);  Cystoscopy and removal of left-sided stent.;  Surgeon: Stiven Olivo MD;  Location:  OR     COMBINED CYSTOSCOPY, RETROGRADES, URETEROSCOPY, INSERT STENT  1/6/2014    Procedure: COMBINED CYSTOSCOPY, RETROGRADES, URETEROSCOPY, INSERT STENT;  Cystyoscopy place left ureteral stent;  Surgeon: Jaun Kimble MD;  Location: [facility] OR     COMBINED CYSTOSCOPY, RETROGRADES, URETEROSCOPY, INSERT STENT Left 10/23/2018    Procedure: Video cystoscopy, left ureteroscopy with stone extraction;  Surgeon: Bull Mast MD;  Location:  OR     CYSTOSCOPY, URETEROSCOPY, COMBINED Right 9/23/2015    Procedure: COMBINED CYSTOSCOPY, URETEROSCOPY;  Surgeon: ROME Jett MD;  Location: [facility] OR     ENT SURGERY       ESOPHAGOSCOPY, GASTROSCOPY, DUODENOSCOPY (EGD), COMBINED  4/8/2013    Procedure: COMBINED ESOPHAGOSCOPY, GASTROSCOPY, DUODENOSCOPY (EGD), BIOPSY SINGLE OR MULTIPLE;  Gastroscopy;  Surgeon: Peter Pickard MD;  Location: [facility] GI     ESOPHAGOSCOPY, GASTROSCOPY, DUODENOSCOPY (EGD), COMBINED Left 10/28/2014    Procedure: COMBINED ESOPHAGOSCOPY, GASTROSCOPY, DUODENOSCOPY (EGD), BIOPSY SINGLE OR MULTIPLE;  Surgeon: Narcisa Ramirez MD;  Location:  OR     ESOPHAGOSCOPY, GASTROSCOPY, DUODENOSCOPY (EGD), COMBINED N/A 12/24/2018    Procedure: COMBINED ESOPHAGOSCOPY, GASTROSCOPY, DUODENOSCOPY (EGD), BIOPSY SINGLE OR MULTIPLE;  Surgeon: Sonu Verduzco MD;  Location: [facility] GI     LAPAROSCOPIC CHOLECYSTECTOMY  11/20/2014    [facility], Dr. Ramirez     LASER HOLMIUM LITHOTRIPSY URETER(S), INSERT STENT, COMBINED  4/2/2014    Procedure: COMBINED CYSTOSCOPY, URETEROSCOPY, LASER HOLMIUM LITHOTRIPSY URETER(S), INSERT STENT;  Cystoscopy,Left Ureteral Stent Removal,Left Ureteroscopy with Laser Lithotripsy,Left Ureter Stent Placement;  Surgeon: ROME Jett MD;  Location: [facility] OR     Transurethral stone resection  03/11/2011       Past  medical history, past surgical history, medications, and allergies were reviewed with the patient. Additional pertinent items: None    FAMILY HISTORY:   Family History   Problem Relation Age of Onset     Gastrointestinal Disease Father         Crohn's Disease     Cancer Father         Liver Cancer     Other Cancer Father         Liver     Cancer Maternal Grandmother         lympoma     Diabetes Maternal Grandmother      Mental Illness Maternal Grandmother      Other Cancer Maternal Grandmother         Non Hodgkins Lymphoma     Diabetes Maternal Grandfather      Hypertension Maternal Grandfather      Prostate Cancer Maternal Grandfather      Hyperlipidemia Maternal Grandfather      Heart Disease Paternal Grandfather         heart disease     Hypertension Brother      Other Cancer Brother         Esophegeal cancer     Diabetes Brother      Hyperlipidemia Mother      Mental Illness Mother      Anxiety Disorder Mother      Anesthesia Reaction Mother      Hypertension Brother      Other Cancer Brother      Other Cancer Paternal Half-Brother         Esophageal       SOCIAL HISTORY:   Social History     Tobacco Use     Smoking status: Current Every Day Smoker     Packs/day: 0.50     Types: Cigarettes     Start date: 12/17/2019     Smokeless tobacco: Former User     Types: Chew     Quit date: 12/17/2019   Substance Use Topics     Alcohol use: No     Alcohol/week: 0.0 standard drinks     Social history was reviewed with the patient. Additional pertinent items: None      Discharge Medication List as of 7/30/2020 10:54 PM      CONTINUE these medications which have NOT CHANGED    Details   alum & mag hydroxide-simethicone (MAALOX ADVANCED MAX ST) 400-400-40 MG/5ML SUSP suspension Take 30 mLs by mouth every 4 hours as needed for indigestion, Disp-355 mL,R-0, Local Print      busPIRone (BUSPAR) 15 MG tablet Take 1 tablet (15 mg) by mouth 3 times daily, Disp-90 tablet,R-0, E-PrescribeAttempting taper off      clindamycin (CLEOCIN  T) 1 % external solution Apply topically 2 times dailyDisp-60 mL,[id number]      fluPHENAZine decanoate (PROLIXIN) 25 MG/ML injection Inject 1 mL (25 mg) into the muscle every 14 days, Disp-6 mL,R-0, E-Prescribe      gabapentin (NEURONTIN) 300 MG capsule Take 900 mg in AM and 1200 mg mid day., Disp-900 capsule,R-0, E-Prescribe      lithium ER (LITHOBID) 300 MG CR tablet Take one tab (1) each morning and three (3) tabs at bedtime, Disp-120 tablet,R-0, E-Prescribe      methylPREDNISolone (MEDROL DOSEPAK) 4 MG tablet therapy pack Follow Package Directions, Disp-21 tablet,R-0, E-Prescribe      naproxen (NAPROSYN) 500 MG tablet TAKE 1 TABLET BY MOUTH TWICE A DAY WITH FOOD FOR 2 WEEKS THEN TAKE 1 TABLET BY MOUTH EVERY TWELVE HOURS AS NEEDED, Disp-60 tablet,R-6, E-PrescribeMaximum Refills Reached      neomycin-polymyxin-dexamethasone (MAXITROL) 3.5-03877-8.1 ophthalmic ointment Apply 1 drop in both eye three times per day. Use for 2 days after the redness is gone. Typically 5-7 days total., Disp-3.5 g,R-0, E-Prescribe      nicotine (NICORETTE) 2 MG gum Place 1 each (2 mg) inside cheek as needed for smoking cessation, Disp-100 each, R-1, E-Prescribe      norelgestromin-ethinyl estradiol (XULANE) 150-35 MCG/24HR patch Place 1 patch onto the skin once a week May use continuously, Disp-12 patch,R-4, E-Prescribe      omeprazole (PRILOSEC) 20 MG DR capsule Take 1 capsule (20 mg) by mouth daily, Disp-30 capsule,R-0, Local Print      ondansetron (ZOFRAN-ODT) 4 MG ODT tab Take 1 tablet (4 mg) by mouth every 8 hours as needed for nausea, Disp-30 tablet,R-0, E-Prescribe      !! QUEtiapine (SEROQUEL) 100 MG tablet Take 1 tablet (100 mg) by mouth At Bedtime, Disp-30 tablet,R-0, E-Prescribe      !! QUEtiapine (SEROQUEL) 25 MG tablet Take 0.5 -1 tab (12.5 mg-25 mg) 1-2 times daily as needed, Disp-60 tablet,R-0, E-Prescribe      tobramycin-dexamethasone (TOBRADEX) 0.3-0.1 % ophthalmic suspension Apply 1 drop in both eye three times  per day, for 7 days., Disp-1 Bottle,R-0, E-Prescribe       !! - Potential duplicate medications found. Please discuss with provider.             Allergies   Allergen Reactions     Haldol [Haloperidol] Other (See Comments)     Makes patient very angry and anxious     Adhesive Tape Hives     Percocet [Oxycodone-Acetaminophen] Nausea and Vomiting     Prednisone Other (See Comments) and Hives     Suicidal ideation     Risperidone Other (See Comments)     Tramadol Hcl Nausea and Vomiting     Droperidol Anxiety     Seroquel [Quetiapine] Palpitations     Spent 2 weeks in the hospital due to having seroquel, caused palpitations and QT prolongation        Review of Systems   Constitutional: Negative for chills and fever.   HENT: Negative for ear pain, sore throat and trouble swallowing.    Eyes: Negative for pain.   Gastrointestinal: Negative for abdominal pain, diarrhea, nausea and vomiting.   Genitourinary: Negative for dysuria and hematuria.   Neurological: Positive for numbness (face). Negative for speech difficulty and weakness.   All other systems reviewed and are negative.    A complete review of systems was performed with pertinent positives and negatives noted in the HPI, and all other systems negative.    Physical Exam   BP: (!) 159/95  Heart Rate: 123  Temp: 98.5  F (36.9  C)  Weight: 103 kg (227 lb)  SpO2: 97 %      Physical Exam  Constitutional:       General: She is not in acute distress.     Appearance: Normal appearance. She is obese.   HENT:      Head: Normocephalic and atraumatic.      Right Ear: Tympanic membrane normal.      Left Ear: Tympanic membrane normal.      Nose: Nose normal.      Mouth/Throat:      Mouth: Mucous membranes are moist.      Pharynx: No oropharyngeal exudate.   Eyes:      Extraocular Movements: Extraocular movements intact.      Pupils: Pupils are equal, round, and reactive to light.   Neck:      Musculoskeletal: Normal range of motion and neck supple.   Cardiovascular:      Rate and  Rhythm: Normal rate and regular rhythm.      Pulses: Normal pulses.   Pulmonary:      Effort: Pulmonary effort is normal.      Breath sounds: Normal breath sounds. No wheezing, rhonchi or rales.   Abdominal:      General: Abdomen is flat.      Palpations: Abdomen is soft.      Tenderness: There is no abdominal tenderness.   Musculoskeletal: Normal range of motion.   Skin:     General: Skin is warm.   Neurological:      General: No focal deficit present.      Mental Status: She is alert and oriented to person, place, and time.      Cranial Nerves: No cranial nerve deficit.      Sensory: No sensory deficit.      Motor: No weakness.      Coordination: Coordination normal.         ED Course   9:42 PM  The patient was seen and examined by Christian Roberson MD in Room ED04.      Procedures          No results found. However, due to the size of the patient record, not all encounters were searched. Please check Results Review for a complete set of results.  Medications - No data to display          Assessments & Plan (with Medical Decision Making)   Patient presents for 1 day of facial paresthesias.  She states that it is tingling.  On exam to light touch of the bilateral face she still has sensation intact and can tell right from left when confrontation without vision.  She does not have any other findings that would be consistent with a Bell's palsy plus it is bilateral and with lack of facial droop or facial weakness and inability to close eyes and do not feel that she has a bilateral Bell's.  Also do not feel that this is stroke given the bilateral aspect plus symptoms have been going on for over 24 hours.  The remainder of her neuro exam is intact there is no deficit.  She was concerned about allergic reaction to her medications however she has been on her psychiatric medications for over 6 months.  With normal exam and her minimal symptoms I do not feel she needs any further emergent testing.  This can be done as an  outpatient if necessary.  Discussed signs and symptoms to return such as fever inability to close the eye removed part of the face or any facial droop as this could occur if she is having a Bell's palsy.  She should also return for other strokelike symptoms.  She can follow-up with her psychiatrist and family physician and will be given the referral number to neurology if her symptoms continue.  Should she have complete loss of sensation she should return but she does not have the symptoms at this time.  Patient understands the plan signs and symptoms to return and is discharged in stable condition    I have reviewed the nursing notes.    I have reviewed the findings, diagnosis, plan and need for follow up with the patient.    Discharge Medication List as of 7/30/2020 10:54 PM          Final diagnoses:   Facial paresthesia     IAlice, am serving as a trained medical scribe to document services personally performed by Christian Roberson MD, based on the provider's statements to me.      IChristian MD, was physically present and have reviewed and verified the accuracy of this note documented by Alice Bruno.     7/30/2020   [facility], [place], EMERGENCY DEPARTMENT     Christian Roberson MD  07/30/20 [id number]

## 2020-07-31 NOTE — ED TRIAGE NOTES
eyegtts started a ciouple days ago. Pt c/o facial numbness. Does not include mouth, no blurry vision or difficulty swallowing. Concern for bp elevation today.

## 2020-08-05 ENCOUNTER — PRE VISIT (OUTPATIENT)
Dept: NEUROLOGY | Facility: CLINIC | Age: 30
End: 2020-08-05

## 2020-08-05 ENCOUNTER — MYC MEDICAL ADVICE (OUTPATIENT)
Dept: FAMILY MEDICINE | Facility: CLINIC | Age: 30
End: 2020-08-05

## 2020-08-05 DIAGNOSIS — F25.0 SCHIZOAFFECTIVE DISORDER, BIPOLAR TYPE (H): Primary | ICD-10-CM

## 2020-08-05 DIAGNOSIS — F25.0 SCHIZOAFFECTIVE DISORDER, BIPOLAR TYPE (H): ICD-10-CM

## 2020-08-05 NOTE — TELEPHONE ENCOUNTER
FUTURE VISIT INFORMATION      FUTURE VISIT INFORMATION:    Date: 8/7/2020    Time: 345pm    Location: [facility] [place]  REFERRAL INFORMATION:    Referring provider:  Dr. Roberson     Referring providers clinic:  [facility] ED     Reason for visit/diagnosis  Facial Numbness     RECORDS REQUESTED FROM:       Clinic name Comments Records Status Imaging Status   Internal MR Brain 6/26/2019    [facility]          [facility] CT Head 12/7/2018 Care Everywhere Requested to PACS

## 2020-08-07 ENCOUNTER — MYC REFILL (OUTPATIENT)
Dept: PSYCHIATRY | Facility: CLINIC | Age: 30
End: 2020-08-07

## 2020-08-07 ENCOUNTER — VIRTUAL VISIT (OUTPATIENT)
Dept: NEUROLOGY | Facility: CLINIC | Age: 30
End: 2020-08-07
Payer: COMMERCIAL

## 2020-08-07 DIAGNOSIS — R20.2 PARESTHESIAS: Primary | ICD-10-CM

## 2020-08-07 DIAGNOSIS — F25.0 SCHIZOAFFECTIVE DISORDER, BIPOLAR TYPE (H): ICD-10-CM

## 2020-08-07 NOTE — PROGRESS NOTES
[facility] Neurology Consultation    Ayana Prasad MRN# [id number]   Age: 30 year old YOB: 1990     Requesting physician: Referred Self  Becki Avery     Reason for Consultation: facial numbness      History of Presenting Symptoms:   Ayana Prasad is a 30 year old female who presents today for evaluation of facial numbness. The patient has pertinent history of TBI, Polysubstance abuse (in remission from methamphetamines, heroin, hallucinogens, marijuana), Depression, Bipolar disorder, ADHD, PTSD, and cauda equina syndrome s/p surgery 12/2016 w/subsequent neurogenic bladder.    On 6/26/2020 the patient was seen in the ED after repeated episodes of leg pain and seizure like behavior.  She was transferred to Neurology service on 6/27/2020 for spells of altered consciousness w/up.  Please see H&P and discharge summary for full report of hospitalization.  In summary, the patent had her target spells captured (back arch, whole body shaking, lasting seconds) and no EEG correlation with seizure activity was noted.  MRI was negative. Her events were thought to be due to anxiety, and mental health, most likely conversion disorder.  No AED was recommended, and she was to follow with psychiatry.    As per recent psychiatry notes (06-07/2020), the patient is currently taking Prolixin 25 mg Q2 weeks, Seroquel 100 mg at bedtime, Lithium 300 mg QAM , and Buspar 30 mg BID.  She was also taking Gabapentin 900 mg TID for nerve pain as per her PCP.  Adverse effects of medications are noted to be movement need of legs which is ongoing for the last year and treated with Vyvanse.  She was reporting increased anxiety, which improved with marijuana use and was requesting medical marijuana prescription for anxiety and chronic back pain.  She was noting to have loud voices keep her up at night and only causing her to get 5-6 hours of interrupted sleep a night.  She was requesting increased gabapentin for pain and anxiety.   "She had an episode of facial numbness on 7/29/2020 which improved with benadryl, but then went to the ED on 7/30/2020 due to a return of her symptoms.    The patient was seen on the [facility] ED on 7/30/2020 for complete facial numbness for over 24 hours.  She indicated having tingling, but not other neurological deficits.  Exam by ED physicians was positive for numbness without sensory loss, no weakness.  Blood pressures were 159/95 while in the ED.  No imaging was done due to limited likelihood of stroke given symptoms and lack of neurological deficits for over 24 hours.    Today, the patient still has on-off facial numbness.  Episodes can last all day, and go away with sleep. No other issues are associated (vision changes, headaches, slurred speech, swallowing difficulties).  The numbness is primarily below her eyes to her chin.  It goes from ear to ear. There is no numbness in her tongue, but her lips are numb at times.  Her buccal mucosa and gums are not numb.  There is no back of neck or top of head symptoms.  No specific symptoms emerge with motions of her neck or arms.  No shooting pain from her neck into her arms or legs.  The symptoms in total have been going on for roughly 2 weeks.      ----------------------------------------------------------  The patient has longstanding psychiatric history with previous mediation management with numerous medications:   - Cymbalta 20-30mg (unknown, 2017 trial)  - Adderall XR 10-20mg (tolerated, some efficacy)  - Xanax 0.5mg (over sedating)  - Abilify 10-15mg (unknown, 2018 trial)  - Lunesta 2-3mg (effective, 2019 trial)  - Prozac 20mg (tolerated, ineffective)  - Intuniv and Tenex 1mg (both effective, severe dry mouth with guanfacine)  - hydroxyzine HCL and catalina 25-50mg (ineffective, worsened urinary retention)  - lamotrigine 200mg (unknown, 2012 trial)  - Vyvanse 20mg (effective, \"better than Adderall\")  - Ativan 0.5mg (effective)  - Latuda 40mg (2018 trial, unknown)  - " melatonin 10mg (ineffective)  - Concerta 18mg (2011 trial, overly sedating)  - Remeron 7.5mg (2018 trial, unsure if effective)  - olanzapine 5-10mg (2019 trial, effective)  - Propranolol 10-20mg (effective, poorly tolerated- may have dropped BP)  - risperidone 0.25-1mg (2017 trial, allergy)  - Strattera 60-80mg (effective, 2016 trial)  - trazodone 50-200mg (ineffective)  - Stelazine 2-6mg (effective)  - Geodon 80mg (limited efficacy)  - Ambien 10mg (effective, possibly parasomnias)  - Prazosin  - Trifluoperazine  - Haldol (allergy, agitating)  - Quetiapine (allergy, QT prolongation, palpitations)      Past Medical History:     Past Medical History:   Diagnosis Date     ADHD (attention deficit hyperactivity disorder)      Bipolar 1 disorder      Borderline personality disorder      Cauda equina syndrome      Chronic low back pain      Depression      Depressive disorder      GERD (gastroesophageal reflux disease)      h/o TBI (traumatic brain injury)      Marginal corneal ulcer, left 7/17/2015     Nephrolithiasis      Polysubstance abuse - methamphetamine, hallucinagen, heroin, marijuana     currently in remission     PONV (postoperative nausea and vomiting)      PTSD (post-traumatic stress disorder)       Past Surgical History:     Past Surgical History:   Procedure Laterality Date     BACK SURGERY - For Cauda Equina Syndrome  12/24/2016     COLONOSCOPY       COMBINED CYSTOSCOPY, INSERT STENT URETER(S) Left 8/30/2018    Procedure: COMBINED CYSTOSCOPY, INSERT STENT URETER(S);  Cystoscopy With Left Stent Placement;  Surgeon: Kiran Ulrich MD;  Location: [facility] OR     COMBINED CYSTOSCOPY, RETROGRADES, EXCHANGE STENT URETER(S) Left 10/14/2018    Procedure: COMBINED CYSTOSCOPY, RETROGRADES, EXCHANGE STENT URETER(S);  Cystoscopy and removal of left-sided stent.;  Surgeon: Stiven Olivo MD;  Location:  OR     COMBINED CYSTOSCOPY, RETROGRADES, URETEROSCOPY, INSERT STENT  1/6/2014    Procedure: COMBINED  CYSTOSCOPY, RETROGRADES, URETEROSCOPY, INSERT STENT;  Cystyoscopy place left ureteral stent;  Surgeon: Jaun Kimble MD;  Location: [facility] OR     COMBINED CYSTOSCOPY, RETROGRADES, URETEROSCOPY, INSERT STENT Left 10/23/2018    Procedure: Video cystoscopy, left ureteroscopy with stone extraction;  Surgeon: Bull Mast MD;  Location:  OR     CYSTOSCOPY, URETEROSCOPY, COMBINED Right 9/23/2015    Procedure: COMBINED CYSTOSCOPY, URETEROSCOPY;  Surgeon: ROME Jett MD;  Location: [facility] OR     ENT SURGERY       ESOPHAGOSCOPY, GASTROSCOPY, DUODENOSCOPY (EGD), COMBINED  4/8/2013    Procedure: COMBINED ESOPHAGOSCOPY, GASTROSCOPY, DUODENOSCOPY (EGD), BIOPSY SINGLE OR MULTIPLE;  Gastroscopy;  Surgeon: Peter Pickard MD;  Location: [facility] GI     ESOPHAGOSCOPY, GASTROSCOPY, DUODENOSCOPY (EGD), COMBINED Left 10/28/2014    Procedure: COMBINED ESOPHAGOSCOPY, GASTROSCOPY, DUODENOSCOPY (EGD), BIOPSY SINGLE OR MULTIPLE;  Surgeon: Narcisa Ramirez MD;  Location:  OR     ESOPHAGOSCOPY, GASTROSCOPY, DUODENOSCOPY (EGD), COMBINED N/A 12/24/2018    Procedure: COMBINED ESOPHAGOSCOPY, GASTROSCOPY, DUODENOSCOPY (EGD), BIOPSY SINGLE OR MULTIPLE;  Surgeon: Sonu Verduzco MD;  Location: [facility] GI     LAPAROSCOPIC CHOLECYSTECTOMY  11/20/2014    [facility], Dr. Ramirez     LASER HOLMIUM LITHOTRIPSY URETER(S), INSERT STENT, COMBINED  4/2/2014    Procedure: COMBINED CYSTOSCOPY, URETEROSCOPY, LASER HOLMIUM LITHOTRIPSY URETER(S), INSERT STENT;  Cystoscopy,Left Ureteral Stent Removal,Left Ureteroscopy with Laser Lithotripsy,Left Ureter Stent Placement;  Surgeon: ROME Jett MD;  Location: [facility] OR     Transurethral stone resection  03/11/2011      Social History:   Grew up in [place], didn't graduate high school. Conduct and possession charges in history. Has had drug-induced mood disorder, dextromethorphan right eye, and BPD diagnosis. Currently lives at [facility] and has HC CM at [facility] (Emily Rodriguez ([phone]). Her care is  restricted to [facility]. Sober from Methamphetamines after 02/2020 relapse.  Relapsed 07/2020 with marijuana.  Tobacco Use     Smoking status: Current Every Day Smoker     Packs/day: 0.50     Types: Cigarettes     Start date: 12/17/2019     Smokeless tobacco: Former User     Types: Chew     Quit date: 12/17/2019   Substance Use Topics     Alcohol use: No     Alcohol/week: 0.0 standard drinks     Drug use: Not Currently     Types: Opiates     Comment: oxy, heroin (smoke)      Family History:     Family History   Problem Relation Age of Onset     Gastrointestinal Disease Father         Crohn's Disease     Cancer Father         Liver Cancer     Other Cancer Father         Liver     Cancer Maternal Grandmother         lympoma     Diabetes Maternal Grandmother      Mental Illness Maternal Grandmother      Other Cancer Maternal Grandmother         Non Hodgkins Lymphoma     Diabetes Maternal Grandfather      Hypertension Maternal Grandfather      Prostate Cancer Maternal Grandfather      Hyperlipidemia Maternal Grandfather      Heart Disease Paternal Grandfather         heart disease     Hypertension Brother      Other Cancer Brother         Esophegeal cancer     Diabetes Brother      Hyperlipidemia Mother      Mental Illness Mother      Anxiety Disorder Mother      Anesthesia Reaction Mother      Hypertension Brother      Other Cancer Brother      Other Cancer Paternal Half-Brother         Esophageal      Medications:     Current Outpatient Medications   Medication Sig     alum & mag hydroxide-simethicone (MAALOX ADVANCED MAX ST) 400-400-40 MG/5ML SUSP suspension Take 30 mLs by mouth every 4 hours as needed for indigestion (Patient not taking: Reported on 7/20/2020)     busPIRone (BUSPAR) 15 MG tablet Take 1 tablet (15 mg) by mouth 3 times daily     clindamycin (CLEOCIN T) 1 % external solution Apply topically 2 times daily     fluPHENAZine decanoate (PROLIXIN) 25 MG/ML injection Inject 1 mL (25 mg) into the muscle every 14  days     gabapentin (NEURONTIN) 300 MG capsule Take 900 mg in AM and 1200 mg mid day.     lithium ER (LITHOBID) 300 MG CR tablet Take one tab (1) each morning and three (3) tabs at bedtime     methylPREDNISolone (MEDROL DOSEPAK) 4 MG tablet therapy pack Follow Package Directions (Patient not taking: Reported on 7/20/2020)     naproxen (NAPROSYN) 500 MG tablet TAKE 1 TABLET BY MOUTH TWICE A DAY WITH FOOD FOR 2 WEEKS THEN TAKE 1 TABLET BY MOUTH EVERY TWELVE HOURS AS NEEDED (Patient not taking: Reported on 7/20/2020)     neomycin-polymyxin-dexamethasone (MAXITROL) 3.5-83141-8.1 ophthalmic ointment Apply 1 drop in both eye three times per day. Use for 2 days after the redness is gone. Typically 5-7 days total.     nicotine (NICORETTE) 2 MG gum Place 1 each (2 mg) inside cheek as needed for smoking cessation (Patient not taking: Reported on 7/20/2020)     norelgestromin-ethinyl estradiol (XULANE) 150-35 MCG/24HR patch Place 1 patch onto the skin once a week May use continuously     omeprazole (PRILOSEC) 20 MG DR capsule Take 1 capsule (20 mg) by mouth daily (Patient not taking: Reported on 7/29/2020)     ondansetron (ZOFRAN-ODT) 4 MG ODT tab Take 1 tablet (4 mg) by mouth every 8 hours as needed for nausea     QUEtiapine (SEROQUEL) 100 MG tablet Take 1 tablet (100 mg) by mouth At Bedtime     QUEtiapine (SEROQUEL) 25 MG tablet Take 0.5 -1 tab (12.5 mg-25 mg) 1-2 times daily as needed     tobramycin-dexamethasone (TOBRADEX) 0.3-0.1 % ophthalmic suspension Apply 1 drop in both eye three times per day, for 7 days. (Patient not taking: Reported on 7/29/2020)      Allergies:     Allergies   Allergen Reactions     Haldol [Haloperidol] Other (See Comments)     Makes patient very angry and anxious     Adhesive Tape Hives     Percocet [Oxycodone-Acetaminophen] Nausea and Vomiting     Prednisone Other (See Comments) and Hives     Suicidal ideation     Risperidone Other (See Comments)     Tramadol Hcl Nausea and Vomiting      Droperidol Anxiety     Seroquel [Quetiapine] Palpitations     Spent 2 weeks in the hospital due to having seroquel, caused palpitations and QT prolongation        Review of Systems:   A comprehensive 10 point review of systems (constitutional, ENT, cardiac, peripheral vascular, lymphatic, respiratory, GI, , Musculoskeletal, skin, Neurological) was performed and found to be negative except as described in this note.      Physical Exam:   General: Seated comfortably in no acute distress.  HEENT: Neck supple with normal range of motion.   Skin: No rashes  Neurologic:     Mental Status: Fully alert, attentive and oriented. Speech clear and fluent, no paraphasic errors.     Cranial Nerves: EOMI with normal smooth pursuit. Facial movements symmetric. Hearing not formally tested but intact to conversation.  No dysarthria.     Motor: No tremors or other abnormal movements observed.      Sensory: Negative Romberg.      Coordination: Finger-nose-finger without dysmetria.     Gait: Normal, steady casual gait.         Assessment and Plan:   Assessment:  Atypical paresthesias not of neurological origin    The patient has a very odd non-dermatomal distrubution of facial numbness.  It could be that a rare type of b/l  compression of both the V2 and V3 trigeminal nerves is occurring, but I would expect far more symptoms to be present if this were the case and for the symptoms to be progressive, non-responsive to sleep or benadryl, and not worsened with anxiety or emotion.  The patient has had a normal brain MRI recently, and her symptoms are not suggestive for infarction given their on/off duration, and poor localization without other neurological sequela present.  I would advise the patient to continue with management of her anxiety and stress, as it is likely contributing to her atypical paresthesias.  In other neurological evaluations, it was thought that the patient had conversion disorder and further treatment regarding  atypical sensations and functional weaknesses may best be treated through CBT in the future.  It was mentioned to the patient that should she develop lateralized weakness, blurred vision/loss of visual field, swallowing difficulties, aphasia, or one sided sensory symptoms, she may need to have evaluation through an ED for possible etiology.  She was in agreement with this plan.     Plan:  No further testing needed from neurological standpoint    No follow up needed, patient can be discharged from neurology clinic unless new symptoms emerge    SATISH Russell D.O.   of Neurology      Greater than 50% of the total time (20 min) in this patient encounter was spent on counseling and/or coordination of care. We reviewed diagnostic results, impressions, and discussed other possible tests if symptoms do not improve. We discussed the implications of the diagnosis, as well as risks and benefits of management options. We reviewed treatment instructions and our scheduled follow-up as specified in the discharge plan. We also discussed the importance of compliance with the chosen course of treatment. The patient is in agreement with this plan and has no further questions.

## 2020-08-07 NOTE — LETTER
8/7/2020       RE: Ayana Prasad  [address] [facility] [address]     Dear Colleague,    Thank you for referring your patient, Ayana Prasad, to the [facility] NEUROLOGY at [facility]. Please see a copy of my visit note below.    [facility] Neurology Consultation    Ayana Prasad MRN# [id number]   Age: 30 year old YOB: 1990     Requesting physician: Referred Self  Becki Avery     Reason for Consultation: facial numbness      History of Presenting Symptoms:   Ayana Prasad is a 30 year old female who presents today for evaluation of facial numbness. The patient has pertinent history of TBI, Polysubstance abuse (in remission from methamphetamines, heroin, hallucinogens, marijuana), Depression, Bipolar disorder, ADHD, PTSD, and cauda equina syndrome s/p surgery 12/2016 w/subsequent neurogenic bladder.    On 6/26/2020 the patient was seen in the ED after repeated episodes of leg pain and seizure like behavior.  She was transferred to Neurology service on 6/27/2020 for spells of altered consciousness w/up.  Please see H&P and discharge summary for full report of hospitalization.  In summary, the patent had her target spells captured (back arch, whole body shaking, lasting seconds) and no EEG correlation with seizure activity was noted.  MRI was negative. Her events were thought to be due to anxiety, and mental health, most likely conversion disorder.  No AED was recommended, and she was to follow with psychiatry.    As per recent psychiatry notes (06-07/2020), the patient is currently taking Prolixin 25 mg Q2 weeks, Seroquel 100 mg at bedtime, Lithium 300 mg QAM , and Buspar 30 mg BID.  She was also taking Gabapentin 900 mg TID for nerve pain as per her PCP.  Adverse effects of medications are noted to be movement need of legs which is ongoing for the last year and treated with Vyvanse.  She was reporting increased anxiety, which improved with marijuana  use and was requesting medical marijuana prescription for anxiety and chronic back pain.  She was noting to have loud voices keep her up at night and only causing her to get 5-6 hours of interrupted sleep a night.  She was requesting increased gabapentin for pain and anxiety.  She had an episode of facial numbness on 7/29/2020 which improved with benadryl, but then went to the ED on 7/30/2020 due to a return of her symptoms.    The patient was seen on the [facility] ED on 7/30/2020 for complete facial numbness for over 24 hours.  She indicated having tingling, but not other neurological deficits.  Exam by ED physicians was positive for numbness without sensory loss, no weakness.  Blood pressures were 159/95 while in the ED.  No imaging was done due to limited likelihood of stroke given symptoms and lack of neurological deficits for over 24 hours.    Today, the patient still has on-off facial numbness.  Episodes can last all day, and go away with sleep. No other issues are associated (vision changes, headaches, slurred speech, swallowing difficulties).  The numbness is primarily below her eyes to her chin.  It goes from ear to ear. There is no numbness in her tongue, but her lips are numb at times.  Her buccal mucosa and gums are not numb.  There is no back of neck or top of head symptoms.  No specific symptoms emerge with motions of her neck or arms.  No shooting pain from her neck into her arms or legs.  The symptoms in total have been going on for roughly 2 weeks.      ----------------------------------------------------------  The patient has longstanding psychiatric history with previous mediation management with numerous medications:   - Cymbalta 20-30mg (unknown, 2017 trial)  - Adderall XR 10-20mg (tolerated, some efficacy)  - Xanax 0.5mg (over sedating)  - Abilify 10-15mg (unknown, 2018 trial)  - Lunesta 2-3mg (effective, 2019 trial)  - Prozac 20mg (tolerated, ineffective)  - Intuniv and Tenex 1mg (both  "effective, severe dry mouth with guanfacine)  - hydroxyzine HCL and catalina 25-50mg (ineffective, worsened urinary retention)  - lamotrigine 200mg (unknown, 2012 trial)  - Vyvanse 20mg (effective, \"better than Adderall\")  - Ativan 0.5mg (effective)  - Latuda 40mg (2018 trial, unknown)  - melatonin 10mg (ineffective)  - Concerta 18mg (2011 trial, overly sedating)  - Remeron 7.5mg (2018 trial, unsure if effective)  - olanzapine 5-10mg (2019 trial, effective)  - Propranolol 10-20mg (effective, poorly tolerated- may have dropped BP)  - risperidone 0.25-1mg (2017 trial, allergy)  - Strattera 60-80mg (effective, 2016 trial)  - trazodone 50-200mg (ineffective)  - Stelazine 2-6mg (effective)  - Geodon 80mg (limited efficacy)  - Ambien 10mg (effective, possibly parasomnias)  - Prazosin  - Trifluoperazine  - Haldol (allergy, agitating)  - Quetiapine (allergy, QT prolongation, palpitations)      Past Medical History:     Past Medical History:   Diagnosis Date     ADHD (attention deficit hyperactivity disorder)      Bipolar 1 disorder      Borderline personality disorder      Cauda equina syndrome      Chronic low back pain      Depression      Depressive disorder      GERD (gastroesophageal reflux disease)      h/o TBI (traumatic brain injury)      Marginal corneal ulcer, left 7/17/2015     Nephrolithiasis      Polysubstance abuse - methamphetamine, hallucinagen, heroin, marijuana     currently in remission     PONV (postoperative nausea and vomiting)      PTSD (post-traumatic stress disorder)       Past Surgical History:     Past Surgical History:   Procedure Laterality Date     BACK SURGERY - For Cauda Equina Syndrome  12/24/2016     COLONOSCOPY       COMBINED CYSTOSCOPY, INSERT STENT URETER(S) Left 8/30/2018    Procedure: COMBINED CYSTOSCOPY, INSERT STENT URETER(S);  Cystoscopy With Left Stent Placement;  Surgeon: Kiran Ulrich MD;  Location: [facility] OR     COMBINED CYSTOSCOPY, RETROGRADES, EXCHANGE STENT URETER(S) " Left 10/14/2018    Procedure: COMBINED CYSTOSCOPY, RETROGRADES, EXCHANGE STENT URETER(S);  Cystoscopy and removal of left-sided stent.;  Surgeon: Stiven Olivo MD;  Location:  OR     COMBINED CYSTOSCOPY, RETROGRADES, URETEROSCOPY, INSERT STENT  1/6/2014    Procedure: COMBINED CYSTOSCOPY, RETROGRADES, URETEROSCOPY, INSERT STENT;  Cystyoscopy place left ureteral stent;  Surgeon: Jaun Kimble MD;  Location: [facility] OR     COMBINED CYSTOSCOPY, RETROGRADES, URETEROSCOPY, INSERT STENT Left 10/23/2018    Procedure: Video cystoscopy, left ureteroscopy with stone extraction;  Surgeon: Bull Mast MD;  Location:  OR     CYSTOSCOPY, URETEROSCOPY, COMBINED Right 9/23/2015    Procedure: COMBINED CYSTOSCOPY, URETEROSCOPY;  Surgeon: ROME Jett MD;  Location: [facility] OR     ENT SURGERY       ESOPHAGOSCOPY, GASTROSCOPY, DUODENOSCOPY (EGD), COMBINED  4/8/2013    Procedure: COMBINED ESOPHAGOSCOPY, GASTROSCOPY, DUODENOSCOPY (EGD), BIOPSY SINGLE OR MULTIPLE;  Gastroscopy;  Surgeon: Peter Pickard MD;  Location: [facility] GI     ESOPHAGOSCOPY, GASTROSCOPY, DUODENOSCOPY (EGD), COMBINED Left 10/28/2014    Procedure: COMBINED ESOPHAGOSCOPY, GASTROSCOPY, DUODENOSCOPY (EGD), BIOPSY SINGLE OR MULTIPLE;  Surgeon: Narcisa Ramirez MD;  Location:  OR     ESOPHAGOSCOPY, GASTROSCOPY, DUODENOSCOPY (EGD), COMBINED N/A 12/24/2018    Procedure: COMBINED ESOPHAGOSCOPY, GASTROSCOPY, DUODENOSCOPY (EGD), BIOPSY SINGLE OR MULTIPLE;  Surgeon: Sonu Verduzco MD;  Location: [facility] GI     LAPAROSCOPIC CHOLECYSTECTOMY  11/20/2014    [facility], Dr. Ramirez     LASER HOLMIUM LITHOTRIPSY URETER(S), INSERT STENT, COMBINED  4/2/2014    Procedure: COMBINED CYSTOSCOPY, URETEROSCOPY, LASER HOLMIUM LITHOTRIPSY URETER(S), INSERT STENT;  Cystoscopy,Left Ureteral Stent Removal,Left Ureteroscopy with Laser Lithotripsy,Left Ureter Stent Placement;  Surgeon: ROME Jett MD;  Location: [facility] OR     Transurethral stone resection  03/11/2011       Social History:   Grew up in [place], didn't graduate high school. Conduct and possession charges in history. Has had drug-induced mood disorder, dextromethorphan right eye, and BPD diagnosis. [facility] and has [facility] CM at [facility] (Emily Rodriguez ([phone]). Her care is restricted to [facility]. Sober from Methamphetamines after 02/2020 relapse.  Relapsed 07/2020 with marijuana.  Tobacco Use     Smoking status: Current Every Day Smoker     Packs/day: 0.50     Types: Cigarettes     Start date: 12/17/2019     Smokeless tobacco: Former User     Types: Chew     Quit date: 12/17/2019   Substance Use Topics     Alcohol use: No     Alcohol/week: 0.0 standard drinks     Drug use: Not Currently     Types: Opiates     Comment: oxy, heroin (smoke)      Family History:     Family History   Problem Relation Age of Onset     Gastrointestinal Disease Father         Crohn's Disease     Cancer Father         Liver Cancer     Other Cancer Father         Liver     Cancer Maternal Grandmother         lympoma     Diabetes Maternal Grandmother      Mental Illness Maternal Grandmother      Other Cancer Maternal Grandmother         Non Hodgkins Lymphoma     Diabetes Maternal Grandfather      Hypertension Maternal Grandfather      Prostate Cancer Maternal Grandfather      Hyperlipidemia Maternal Grandfather      Heart Disease Paternal Grandfather         heart disease     Hypertension Brother      Other Cancer Brother         Esophegeal cancer     Diabetes Brother      Hyperlipidemia Mother      Mental Illness Mother      Anxiety Disorder Mother      Anesthesia Reaction Mother      Hypertension Brother      Other Cancer Brother      Other Cancer Paternal Half-Brother         Esophageal      Medications:     Current Outpatient Medications   Medication Sig     alum & mag hydroxide-simethicone (MAALOX ADVANCED MAX ST) 400-400-40 MG/5ML SUSP suspension Take 30 mLs by mouth every 4 hours as needed for indigestion (Patient not  taking: Reported on 7/20/2020)     busPIRone (BUSPAR) 15 MG tablet Take 1 tablet (15 mg) by mouth 3 times daily     clindamycin (CLEOCIN T) 1 % external solution Apply topically 2 times daily     fluPHENAZine decanoate (PROLIXIN) 25 MG/ML injection Inject 1 mL (25 mg) into the muscle every 14 days     gabapentin (NEURONTIN) 300 MG capsule Take 900 mg in AM and 1200 mg mid day.     lithium ER (LITHOBID) 300 MG CR tablet Take one tab (1) each morning and three (3) tabs at bedtime     methylPREDNISolone (MEDROL DOSEPAK) 4 MG tablet therapy pack Follow Package Directions (Patient not taking: Reported on 7/20/2020)     naproxen (NAPROSYN) 500 MG tablet TAKE 1 TABLET BY MOUTH TWICE A DAY WITH FOOD FOR 2 WEEKS THEN TAKE 1 TABLET BY MOUTH EVERY TWELVE HOURS AS NEEDED (Patient not taking: Reported on 7/20/2020)     neomycin-polymyxin-dexamethasone (MAXITROL) 3.5-65452-8.1 ophthalmic ointment Apply 1 drop in both eye three times per day. Use for 2 days after the redness is gone. Typically 5-7 days total.     nicotine (NICORETTE) 2 MG gum Place 1 each (2 mg) inside cheek as needed for smoking cessation (Patient not taking: Reported on 7/20/2020)     norelgestromin-ethinyl estradiol (XULANE) 150-35 MCG/24HR patch Place 1 patch onto the skin once a week May use continuously     omeprazole (PRILOSEC) 20 MG DR capsule Take 1 capsule (20 mg) by mouth daily (Patient not taking: Reported on 7/29/2020)     ondansetron (ZOFRAN-ODT) 4 MG ODT tab Take 1 tablet (4 mg) by mouth every 8 hours as needed for nausea     QUEtiapine (SEROQUEL) 100 MG tablet Take 1 tablet (100 mg) by mouth At Bedtime     QUEtiapine (SEROQUEL) 25 MG tablet Take 0.5 -1 tab (12.5 mg-25 mg) 1-2 times daily as needed     tobramycin-dexamethasone (TOBRADEX) 0.3-0.1 % ophthalmic suspension Apply 1 drop in both eye three times per day, for 7 days. (Patient not taking: Reported on 7/29/2020)      Allergies:     Allergies   Allergen Reactions     Haldol [Haloperidol] Other  (See Comments)     Makes patient very angry and anxious     Adhesive Tape Hives     Percocet [Oxycodone-Acetaminophen] Nausea and Vomiting     Prednisone Other (See Comments) and Hives     Suicidal ideation     Risperidone Other (See Comments)     Tramadol Hcl Nausea and Vomiting     Droperidol Anxiety     Seroquel [Quetiapine] Palpitations     Spent 2 weeks in the hospital due to having seroquel, caused palpitations and QT prolongation        Review of Systems:   A comprehensive 10 point review of systems (constitutional, ENT, cardiac, peripheral vascular, lymphatic, respiratory, GI, , Musculoskeletal, skin, Neurological) was performed and found to be negative except as described in this note.      Physical Exam:   General: Seated comfortably in no acute distress.  HEENT: Neck supple with normal range of motion.   Skin: No rashes  Neurologic:     Mental Status: Fully alert, attentive and oriented. Speech clear and fluent, no paraphasic errors.     Cranial Nerves: EOMI with normal smooth pursuit. Facial movements symmetric. Hearing not formally tested but intact to conversation.  No dysarthria.     Motor: No tremors or other abnormal movements observed.      Sensory: Negative Romberg.      Coordination: Finger-nose-finger without dysmetria.     Gait: Normal, steady casual gait.         Assessment and Plan:   Assessment:  Atypical paresthesias not of neurological origin    The patient has a very odd non-dermatomal distrubution of facial numbness.  It could be that a rare type of b/l  compression of both the V2 and V3 trigeminal nerves is occurring, but I would expect far more symptoms to be present if this were the case and for the symptoms to be progressive, non-responsive to sleep or benadryl, and not worsened with anxiety or emotion.  The patient has had a normal brain MRI recently, and her symptoms are not suggestive for infarction given their on/off duration, and poor localization without other neurological  sequela present.  I would advise the patient to continue with management of her anxiety and stress, as it is likely contributing to her atypical paresthesias.  In other neurological evaluations, it was thought that the patient had conversion disorder and further treatment regarding atypical sensations and functional weaknesses may best be treated through CBT in the future.  It was mentioned to the patient that should she develop lateralized weakness, blurred vision/loss of visual field, swallowing difficulties, aphasia, or one sided sensory symptoms, she may need to have evaluation through an ED for possible etiology.  She was in agreement with this plan.     Plan:  No further testing needed from neurological standpoint    No follow up needed, patient can be discharged from neurology clinic unless new symptoms emerge    SATISH Russell D.O.   of Neurology      Greater than 50% of the total time (20 min) in this patient encounter was spent on counseling and/or coordination of care. We reviewed diagnostic results, impressions, and discussed other possible tests if symptoms do not improve. We discussed the implications of the diagnosis, as well as risks and benefits of management options. We reviewed treatment instructions and our scheduled follow-up as specified in the discharge plan. We also discussed the importance of compliance with the chosen course of treatment. The patient is in agreement with this plan and has no further questions.

## 2020-08-07 NOTE — TELEPHONE ENCOUNTER
Last seen: 7/22  RTC: 7-8 weeks   Cancel: none   No-show: none   Next appt: 9/16    Incoming refill from patient via [employer]     Medication requested: QUEtiapine (SEROQUEL) 100 MG tablet   Directions: Take 1 tablet (100 mg) by mouth At Bedtime - Oral   Qty: 30  Last refilled: 7/15    Will route to provider for approval

## 2020-08-09 RX ORDER — QUETIAPINE FUMARATE 100 MG/1
100 TABLET, FILM COATED ORAL AT BEDTIME
Qty: 30 TABLET | Refills: 0 | Status: SHIPPED | OUTPATIENT
Start: 2020-08-09 | End: 2021-02-23

## 2020-08-10 RX ORDER — QUETIAPINE FUMARATE 100 MG/1
TABLET, FILM COATED ORAL
Qty: 30 TABLET | Refills: 0 | OUTPATIENT
Start: 2020-08-10

## 2020-08-10 NOTE — TELEPHONE ENCOUNTER
- Meds refilled by provider   - Med tab changed to reflect this   - Patient notified via [employer]     No further action needed by this writer

## 2020-08-12 ENCOUNTER — OFFICE VISIT (OUTPATIENT)
Dept: FAMILY MEDICINE | Facility: CLINIC | Age: 30
End: 2020-08-12
Payer: COMMERCIAL

## 2020-08-12 ENCOUNTER — TELEPHONE (OUTPATIENT)
Dept: PSYCHIATRY | Facility: CLINIC | Age: 30
End: 2020-08-12

## 2020-08-12 VITALS
TEMPERATURE: 99.3 F | DIASTOLIC BLOOD PRESSURE: 80 MMHG | SYSTOLIC BLOOD PRESSURE: 138 MMHG | HEART RATE: 68 BPM | RESPIRATION RATE: 16 BRPM | BODY MASS INDEX: 35.65 KG/M2 | WEIGHT: 227.6 LBS

## 2020-08-12 DIAGNOSIS — F25.0 SCHIZOAFFECTIVE DISORDER, BIPOLAR TYPE (H): ICD-10-CM

## 2020-08-12 DIAGNOSIS — E66.812 CLASS 2 OBESITY DUE TO EXCESS CALORIES WITHOUT SERIOUS COMORBIDITY WITH BODY MASS INDEX (BMI) OF 35.0 TO 35.9 IN ADULT: ICD-10-CM

## 2020-08-12 DIAGNOSIS — E66.09 CLASS 2 OBESITY DUE TO EXCESS CALORIES WITHOUT SERIOUS COMORBIDITY WITH BODY MASS INDEX (BMI) OF 35.0 TO 35.9 IN ADULT: ICD-10-CM

## 2020-08-12 DIAGNOSIS — R20.0 FACIAL NUMBNESS: Primary | ICD-10-CM

## 2020-08-12 PROCEDURE — 99214 OFFICE O/P EST MOD 30 MIN: CPT | Performed by: NURSE PRACTITIONER

## 2020-08-12 ASSESSMENT — ENCOUNTER SYMPTOMS
FEVER: 0
SHORTNESS OF BREATH: 0
WEAKNESS: 0
CHILLS: 0
SINUS PRESSURE: 0
COUGH: 0
EYE ITCHING: 0
NUMBNESS: 1
FATIGUE: 0
SORE THROAT: 0
EYE DISCHARGE: 0
DIZZINESS: 0
RHINORRHEA: 0
HEADACHES: 0
TREMORS: 0
DIAPHORESIS: 0
DIARRHEA: 0
CONSTIPATION: 0
NAUSEA: 0
SEIZURES: 0
WHEEZING: 0
UNEXPECTED WEIGHT CHANGE: 1
SPEECH DIFFICULTY: 0
LIGHT-HEADEDNESS: 0

## 2020-08-12 ASSESSMENT — PAIN SCALES - GENERAL: PAINLEVEL: NO PAIN (0)

## 2020-08-12 NOTE — PROGRESS NOTES
Subjective     Ayana Prasad is a 30 year old female who presents to clinic today for the following health issues:    HPI       Chief Complaint   Patient presents with     ER F/U     ED/UC Followup:    Facility:  [facility])  Date of visit: 7/30/2020  Reason for visit: facial paresthesia  Current Status: no improvement         Current Outpatient Medications   Medication Sig Dispense Refill     busPIRone (BUSPAR) 15 MG tablet Take 1 tablet (15 mg) by mouth 3 times daily 90 tablet 0     clindamycin (CLEOCIN T) 1 % external solution Apply topically 2 times daily 60 mL 2     fluPHENAZine decanoate (PROLIXIN) 25 MG/ML injection Inject 1 mL (25 mg) into the muscle every 14 days 6 mL 0     gabapentin (NEURONTIN) 300 MG capsule Take 900 mg in AM and 1200 mg mid day. 900 capsule 0     lithium ER (LITHOBID) 300 MG CR tablet Take one tab (1) each morning and three (3) tabs at bedtime 120 tablet 0     norelgestromin-ethinyl estradiol (XULANE) 150-35 MCG/24HR patch Place 1 patch onto the skin once a week May use continuously 12 patch 4     ondansetron (ZOFRAN-ODT) 4 MG ODT tab Take 1 tablet (4 mg) by mouth every 8 hours as needed for nausea 30 tablet 0     QUEtiapine (SEROQUEL) 100 MG tablet Take 1 tablet (100 mg) by mouth At Bedtime 30 tablet 0     QUEtiapine (SEROQUEL) 25 MG tablet Take 0.5 -1 tab (12.5 mg-25 mg) 1-2 times daily as needed 60 tablet 0     methylPREDNISolone (MEDROL DOSEPAK) 4 MG tablet therapy pack Follow Package Directions (Patient not taking: Reported on 7/20/2020) 21 tablet 0     naproxen (NAPROSYN) 500 MG tablet TAKE 1 TABLET BY MOUTH TWICE A DAY WITH FOOD FOR 2 WEEKS THEN TAKE 1 TABLET BY MOUTH EVERY TWELVE HOURS AS NEEDED (Patient not taking: Reported on 7/20/2020) 60 tablet 6     neomycin-polymyxin-dexamethasone (MAXITROL) 3.5-31413-0.1 ophthalmic ointment Apply 1 drop in both eye three times per day. Use for 2 days after the redness is gone. Typically 5-7  days total. (Patient not taking: Reported on 8/12/2020) 3.5 g 0     nicotine (NICORETTE) 2 MG gum Place 1 each (2 mg) inside cheek as needed for smoking cessation (Patient not taking: Reported on 7/20/2020) 100 each 1     tobramycin-dexamethasone (TOBRADEX) 0.3-0.1 % ophthalmic suspension Apply 1 drop in both eye three times per day, for 7 days. (Patient not taking: Reported on 7/29/2020) 1 Bottle 0     Allergies   Allergen Reactions     Haldol [Haloperidol] Other (See Comments)     Makes patient very angry and anxious     Adhesive Tape Hives     Percocet [Oxycodone-Acetaminophen] Nausea and Vomiting     Prednisone Other (See Comments) and Hives     Suicidal ideation     Risperidone Other (See Comments)     Tramadol Hcl Nausea and Vomiting     Droperidol Anxiety     Seroquel [Quetiapine] Palpitations     Spent 2 weeks in the hospital due to having seroquel, caused palpitations and QT prolongation       Reviewed and updated as needed this visit by Provider       Is having facial numbness to entire face. Will come and go. Both sides are equal. Unsure what brings it on. Wondering if is allergic to coffee creamer at place where is staying. Did stop using the coffee creamer yesterday but is still having it today. Saw neurology and was told was stress and anxiety. Last MRI of brain June 2019. Has had this for the last couple of weeks. Will just be to face. Happens in the AM after drinks coffee. No correlation as to when is anxious. Will have the numbness to face all day. When wakes in the AM will not have it. If takes 100 mg of benadryl is will go away. Will take 50 mg of benadryl every 4 hours and then will be better. Unsure if the intensity of the numbness changes. No trouble with chewing or swallowing, no vision changes. Is able to move arms and legs. Maternal side does have MS. No recent injuries. No recent use of illicit substances.     Found a new psychiatrist. Needs to have them added to her prescribe list. Has  seen new provider, Jim Corbett PMHNP-BC at [facility] in [place]. . Sees her again next week and then is unsure when will see her again. Has not had any medication changes. Feels like this is a better fit for her. Reports that when uses illicit drugs does feel like it helps to decrease the voices and does not cause to be paranoid.  Reports new provider is in agreement with medical marijuana and with likely getting her off of injections in the future.    Current meds that are on has caused to gain at least 30 pounds.  Reports has been eating well.  All meals are usually prepared for her but really watches diet.  Remains very active.  Walks multiple miles per day.  Does this to help deal with anxiety.  Not sure what else can do to help control weight.      Objective    /80 (BP Location: Right arm, Patient Position: Sitting, Cuff Size: Adult Large)   Pulse 68   Temp 99.3  F (37.4  C) (Tympanic)   Resp 16   Wt 103.2 kg (227 lb 9.6 oz)   LMP  (LMP Unknown)   BMI 35.65 kg/m    Body mass index is 35.65 kg/m .          Assessment & Plan      Review of Systems   Constitutional: Positive for unexpected weight change (gain). Negative for chills, diaphoresis, fatigue and fever.   HENT: Negative for ear discharge, ear pain, hearing loss, rhinorrhea, sinus pressure and sore throat.    Eyes: Negative for discharge and itching.   Respiratory: Negative for cough, shortness of breath and wheezing.    Gastrointestinal: Negative for constipation, diarrhea and nausea.   Skin: Negative for rash.   Neurological: Positive for numbness (face). Negative for dizziness, tremors, seizures, speech difficulty, weakness, light-headedness and headaches.       Physical Exam  Constitutional:       Appearance: She is well-developed.   HENT:      Right Ear: Tympanic membrane and external ear normal. No middle ear effusion. Tympanic membrane is not erythematous.      Left Ear: Tympanic membrane and external ear normal.  No  middle ear effusion. Tympanic membrane is not erythematous.      Nose: No mucosal edema or rhinorrhea.   Cardiovascular:      Rate and Rhythm: Normal rate and regular rhythm.      Heart sounds: Normal heart sounds.   Pulmonary:      Effort: Pulmonary effort is normal.      Breath sounds: Normal breath sounds.   Abdominal:      General: Bowel sounds are normal.      Palpations: Abdomen is soft.   Skin:     General: Skin is warm and dry.   Neurological:      Mental Status: She is alert.      Comments: Cranial nerve V intact            1. Facial numbness  Last MRI approximately 1 year ago.  We will plan to repeat.  Previous labs reviewed.  Did discuss findings with Ayana.  Unlikely due to a neurological cause, does not have it when wakes but comes on during the day.  More likely to be related to underlying mental health issue  - MR Brain w/o Contrast; Future    2. Class 2 obesity due to excess calories without serious comorbidity with body mass index (BMI) of 35.0 to 35.9 in adult  Order placed, plans to call per self and set up  Recommend avoiding stimulants due to previous history of abuse  - COMPREHENSIVE WEIGHT MANAGEMENT    3. Schizoaffective disorder, bipolar type (H)  Continue to follow with mental health.  I would not recommend medical marijuana due to previous history of polysubstance abuse.     Tobacco Cessation:   reports that she has been smoking cigarettes. She started smoking about 7 months ago. She has been smoking about 0.50 packs per day. She quit smokeless tobacco use about 7 months ago.  Her smokeless tobacco use included chew.      No follow-ups on file.    BROOKLYN Cruz [facility]

## 2020-08-12 NOTE — TELEPHONE ENCOUNTER
On 8/12/2020 the patient signed an VAL authorizing medical records to be released from [facility] to [facility], [facility] for the purpose of continuing care. I faxed the request to our med rec's dept at [phone]. I sent the original to VAL to scanning and kept a copy in psychiatry until scanning is complete.  Nina Garcia, CMA

## 2020-08-13 ENCOUNTER — TELEPHONE (OUTPATIENT)
Dept: ENDOCRINOLOGY | Facility: CLINIC | Age: 30
End: 2020-08-13

## 2020-08-13 DIAGNOSIS — F25.0 SCHIZOAFFECTIVE DISORDER, BIPOLAR TYPE (H): ICD-10-CM

## 2020-08-13 RX ORDER — LITHIUM CARBONATE 300 MG/1
TABLET, FILM COATED, EXTENDED RELEASE ORAL
Qty: 120 TABLET | Refills: 0 | Status: SHIPPED | OUTPATIENT
Start: 2020-08-13 | End: 2021-04-19

## 2020-08-13 NOTE — TELEPHONE ENCOUNTER
Reason for call:  Other   Patient called regarding (reason for call): appointment  Additional comments: Please attach nonsurgical questionnaires for upcoming appointment.     Phone number to reach patient:  Cell number on file:    Telephone Information:   Mobile [phone]       Best Time:  anytime    Can we leave a detailed message on this number?  Not Applicable    Travel screening: Not Applicable

## 2020-08-14 ENCOUNTER — TELEPHONE (OUTPATIENT)
Dept: ENDOCRINOLOGY | Facility: CLINIC | Age: 30
End: 2020-08-14

## 2020-08-17 ENCOUNTER — VIRTUAL VISIT (OUTPATIENT)
Dept: ENDOCRINOLOGY | Facility: CLINIC | Age: 30
End: 2020-08-17
Payer: COMMERCIAL

## 2020-08-17 ENCOUNTER — VIRTUAL VISIT (OUTPATIENT)
Dept: SURGERY | Facility: CLINIC | Age: 30
End: 2020-08-17
Payer: COMMERCIAL

## 2020-08-17 VITALS — BODY MASS INDEX: 36.48 KG/M2 | HEIGHT: 66 IN | WEIGHT: 227 LBS

## 2020-08-17 DIAGNOSIS — E66.01 MORBID OBESITY (H): Primary | ICD-10-CM

## 2020-08-17 DIAGNOSIS — Z71.3 NUTRITIONAL COUNSELING: ICD-10-CM

## 2020-08-17 ASSESSMENT — PAIN SCALES - GENERAL: PAINLEVEL: NO PAIN (0)

## 2020-08-17 ASSESSMENT — MIFFLIN-ST. JEOR: SCORE: 1766.42

## 2020-08-17 NOTE — LETTER
"8/17/2020       RE: Ayana Prasad  [address] [facility] [address] [facility] [address]     Dear Colleague,    Thank you for referring your patient, Ayana Prasad, to the [facility] SURGICAL WEIGHT MANAGEMENT at [facility]. Please see a copy of my visit note below.  New Weight Management Nutrition Consultation    Ayana Prasad is a 30 year old female presents today for new weight management nutrition consultation.  Patient referred by Dr. Edge on August 16, 2020.    Patient with Co-morbidities of obesity including:  Type II DM no  Renal Failure no  Sleep apnea no  Hypertension no   Dyslipidemia no  Joint pain no  Back pain yes per pt's report   GERD yes     Anthropometrics:  Estimated body mass index is 35.65 kg/m  as calculated from the following:    Height as of 2/3/20: 1.702 m (5' 7\").    Weight as of 8/12/20: 103.2 kg (227 lb 9.6 oz).    Medications for Weight Loss:  None at this time.     NUTRITION HISTORY  See MD note for details.      Wake up at 9 am and have water and eat a meal at noon and then eat a meal at 5 pm and another meal at 8 pm, sometimes snacks: chips     Recent food recall:  Breakfast: skips  Lunch: Mac and cheese   Dinner: entree and salad   entress   Snacks: after dinner  Beverages: water, sometimes a monster.   Alcohol: none  Dining out: none     Physical Activity:  Did not discuss today     MALNUTRITION  Video visit: visual NFPE   % Intake: No decreased intake noted  % Weight Loss: None noted  Subcutaneous Fat Loss: None observed  Muscle Loss: None observed  Fluid Accumulation/Edema: Unable to assess  Malnutrition Diagnosis: Patient does not meet two of the established criteria necessary for diagnosing malnutrition    Nutrition Prescription  Recommended energy/nutrient modification.    Nutrition Diagnosis  Obesity r/t long history of self-monitoring deficit and excessive energy intake aeb BMI >30.    Nutrition Intervention  Materials/education provided on " "Volumetric eating to help satiety level on fewer calories; portion control and healthy food choices (Plate Method), Dicussed eating pace and the importance of protein.  - AVS via [employer]     Patient Understanding: fair  Expected Compliance: Good - pt was very short on the phone and did not give detail, this aquino not indicate compliance but I am unsure of her full understanding given the brief visit.   Follow-Up Plans: exercise and snacking      Nutrition Goals  1) Eat slowly (20-30 minutes per meal), stop as soon as   2) 9\" Plate method (1/2 plate non-starchy vegetables/fruit, 1/4 plate lean protein, 1/4 plate whole grain starch - no more than 1/2 cup carb/meal)  3) Eat 3 meals per day          Initial Consult / Weight Loss    My Plate Planner_English - Pt Education  [email or web address]    Protein Sources for Weight Loss  [email or web address]     Follow-Up: 1 month  Time spent with patient: 14 minutes.    Again, thank you for allowing me to participate in the care of your patient.  Sincerely,    Lynda Clay, MS, RD, LD  "
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
## 2020-08-17 NOTE — NURSING NOTE
"Chief Complaint   Patient presents with     Consult     New appointment.       Vitals:    08/17/20 0807   Weight: 103 kg (227 lb)   Height: 1.676 m (5' 6\")       Body mass index is 36.64 kg/m .                            MARGARET ÓLPEZ, EMT    "

## 2020-08-17 NOTE — LETTER
"8/17/2020       RE: Ayana Prasad  [address] [address]     Dear Colleague,    Thank you for referring your patient, Ayana Prasad, to the [facility] SURGICAL WEIGHT MANAGEMENT at [facility]. Please see a copy of my visit note below.    Ayana Prasad is a 30 year old female who is being evaluated via a billable video visit.      The patient has been notified of following:     \"This video visit will be conducted via a call between you and your physician/provider. We have found that certain health care needs can be provided without the need for an in-person physical exam.  This service lets us provide the care you need with a video conversation.  If a prescription is necessary we can send it directly to your pharmacy.  If lab work is needed we can place an order for that and you can then stop by our lab to have the test done at a later time.    Video visits are billed at different rates depending on your insurance coverage.  Please reach out to your insurance provider with any questions.    If during the course of the call the physician/provider feels a video visit is not appropriate, you will not be charged for this service.\"    Patient has given verbal consent for Video visit? Yes  How would you like to obtain your AVS? MyChart  If you are dropped from the video visit, the video invite should be resent to: Text to cell phone: [phone]  Will anyone else be joining your video visit? No        Video-Visit Details    Type of service:  Video Visit    Video Start Time: 10:10 AM  Video End Time: 10:24 AM    Originating Location (pt. Location): Home    Distant Location (provider location):  [facility] SURGICAL WEIGHT MANAGEMENT     Platform used for Video Visit: [email or web address]    During this virtual visit the patient is located in [place], patient verifies this as the location during the entirety of this visit.       New Weight Management Nutrition Consultation    Ayana VINCENT " "Shanice is a 30 year old female presents today for new weight management nutrition consultation.  Patient referred by Dr. Edge on August 16, 2020.    Patient with Co-morbidities of obesity including:  Type II DM no  Renal Failure no  Sleep apnea no  Hypertension no   Dyslipidemia no  Joint pain no  Back pain yes per pt's report   GERD yes     Anthropometrics:  Estimated body mass index is 35.65 kg/m  as calculated from the following:    Height as of 2/3/20: 1.702 m (5' 7\").    Weight as of 8/12/20: 103.2 kg (227 lb 9.6 oz).    Medications for Weight Loss:  None at this time.     NUTRITION HISTORY  See MD note for details.    Wake up at 9 am and have water and eat a meal at noon and then eat a meal at 5 pm and another meal at 8 pm, sometimes snacks: chips     Recent food recall:  Breakfast: skips  Lunch: Mac and cheese   Dinner: entree and salad   entress   Snacks: after dinner  Beverages: water, sometimes a monster.   Alcohol: none  Dining out: none     Physical Activity:  Did not discuss today     MALNUTRITION  Video visit: visual NFPE   % Intake: No decreased intake noted  % Weight Loss: None noted  Subcutaneous Fat Loss: None observed  Muscle Loss: None observed  Fluid Accumulation/Edema: Unable to assess  Malnutrition Diagnosis: Patient does not meet two of the established criteria necessary for diagnosing malnutrition    Nutrition Prescription  Recommended energy/nutrient modification.    Nutrition Diagnosis  Obesity r/t long history of self-monitoring deficit and excessive energy intake aeb BMI >30.    Nutrition Intervention  Materials/education provided on Volumetric eating to help satiety level on fewer calories; portion control and healthy food choices (Plate Method), Dicussed eating pace and the importance of protein.  - AVS via [employer]     Patient Understanding: fair  Expected Compliance: Good - pt was very short on the phone and did not give detail, this aquino not indicate compliance but I am unsure of " "her full understanding given the brief visit.   Follow-Up Plans: exercise and snacking      Nutrition Goals  1) Eat slowly (20-30 minutes per meal), stop as soon as   2) 9\" Plate method (1/2 plate non-starchy vegetables/fruit, 1/4 plate lean protein, 1/4 plate whole grain starch - no more than 1/2 cup carb/meal)  3) Eat 3 meals per day    Initial Consult / Weight Loss    My Plate Planner_English - Pt Education  [email or web address]    Protein Sources for Weight Loss  [email or web address]       Follow-Up: 1 month     Time spent with patient: 14 minutes.  Lynda Clay, MS, RD, LD          Again, thank you for allowing me to participate in the care of your patient.      Sincerely,    Lynda Clay RD      "

## 2020-08-17 NOTE — PROGRESS NOTES
"Ayana Prasad is a 30 year old female who is being evaluated via a billable video visit.      The patient has been notified of following:     \"This video visit will be conducted via a call between you and your physician/provider. We have found that certain health care needs can be provided without the need for an in-person physical exam.  This service lets us provide the care you need with a video conversation.  If a prescription is necessary we can send it directly to your pharmacy.  If lab work is needed we can place an order for that and you can then stop by our lab to have the test done at a later time.    Video visits are billed at different rates depending on your insurance coverage.  Please reach out to your insurance provider with any questions.    If during the course of the call the physician/provider feels a video visit is not appropriate, you will not be charged for this service.\"    Patient has given verbal consent for Video visit? Yes  How would you like to obtain your AVS? MyChart  If you are dropped from the video visit, the video invite should be resent to: Text to cell phone: [phone]  Will anyone else be joining your video visit? No        Video-Visit Details    Type of service:  Video Visit    Video Start Time: 10:10 AM  Video End Time: 10:24 AM    Originating Location (pt. Location): Home    Distant Location (provider location):  "[facility]," WEIGHT MANAGEMENT     Platform used for Video Visit: [employer]    During this virtual visit the patient is located in [place], patient verifies this as the location during the entirety of this visit.       New Weight Management Nutrition Consultation    Ayana Prasad is a 30 year old female presents today for new weight management nutrition consultation.  Patient referred by Dr. Edge on August 16, 2020.    Patient with Co-morbidities of obesity including:  Type II DM no  Renal Failure no  Sleep apnea no  Hypertension no   Dyslipidemia no  Joint pain " "no  Back pain yes per pt's report   GERD yes     Anthropometrics:  Estimated body mass index is 35.65 kg/m  as calculated from the following:    Height as of 2/3/20: 1.702 m (5' 7\").    Weight as of 8/12/20: 103.2 kg (227 lb 9.6 oz).    Medications for Weight Loss:  None at this time.     NUTRITION HISTORY  See MD note for details.    Wake up at 9 am and have water and eat a meal at noon and then eat a meal at 5 pm and another meal at 8 pm, sometimes snacks: chips     Recent food recall:  Breakfast: skips  Lunch: Mac and cheese   Dinner: entree and salad   entress   Snacks: after dinner  Beverages: water, sometimes a monster.   Alcohol: none  Dining out: none     Physical Activity:  Did not discuss today     MALNUTRITION  Video visit: visual NFPE   % Intake: No decreased intake noted  % Weight Loss: None noted  Subcutaneous Fat Loss: None observed  Muscle Loss: None observed  Fluid Accumulation/Edema: Unable to assess  Malnutrition Diagnosis: Patient does not meet two of the established criteria necessary for diagnosing malnutrition    Nutrition Prescription  Recommended energy/nutrient modification.    Nutrition Diagnosis  Obesity r/t long history of self-monitoring deficit and excessive energy intake aeb BMI >30.    Nutrition Intervention  Materials/education provided on Volumetric eating to help satiety level on fewer calories; portion control and healthy food choices (Plate Method), Dicussed eating pace and the importance of protein.  - AVS via Home Inventory S[pecialistst     Patient Understanding: fair  Expected Compliance: Good - pt was very short on the phone and did not give detail, this aquino not indicate compliance but I am unsure of her full understanding given the brief visit.   Follow-Up Plans: exercise and snacking      Nutrition Goals  1) Eat slowly (20-30 minutes per meal), stop as soon as   2) 9\" Plate method (1/2 plate non-starchy vegetables/fruit, 1/4 plate lean protein, 1/4 plate whole grain starch - no more than 1/2 " cup carb/meal)  3) Eat 3 meals per day    Initial Consult / Weight Loss    My Plate Planner_English - Pt Education  [email or web address]    Protein Sources for Weight Loss  [email or web address]       Follow-Up: 1 month     Time spent with patient: 14 minutes.  Lynda Clay, MS, RD, LD
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
## 2020-08-20 ENCOUNTER — HOSPITAL ENCOUNTER (EMERGENCY)
Facility: CLINIC | Age: 30
Discharge: HOME | End: 2020-08-20
Attending: EMERGENCY MEDICINE | Admitting: EMERGENCY MEDICINE
Payer: COMMERCIAL

## 2020-08-20 VITALS
TEMPERATURE: 98.1 F | HEART RATE: 74 BPM | DIASTOLIC BLOOD PRESSURE: 80 MMHG | OXYGEN SATURATION: 100 % | SYSTOLIC BLOOD PRESSURE: 124 MMHG | RESPIRATION RATE: 16 BRPM

## 2020-08-20 DIAGNOSIS — K08.89 PAIN, DENTAL: ICD-10-CM

## 2020-08-20 DIAGNOSIS — K08.409: ICD-10-CM

## 2020-08-20 PROCEDURE — 64400 NJX AA&/STRD TRIGEMINAL NRV: CPT

## 2020-08-20 PROCEDURE — 99283 EMERGENCY DEPT VISIT LOW MDM: CPT | Mod: 25 | Performed by: EMERGENCY MEDICINE

## 2020-08-20 PROCEDURE — 64400 NJX AA&/STRD TRIGEMINAL NRV: CPT | Mod: Z6 | Performed by: EMERGENCY MEDICINE

## 2020-08-20 PROCEDURE — 99283 EMERGENCY DEPT VISIT LOW MDM: CPT | Mod: 25

## 2020-08-20 RX ORDER — BUPIVACAINE HYDROCHLORIDE AND EPINEPHRINE 5; 5 MG/ML; UG/ML
30 INJECTION, SOLUTION EPIDURAL; INTRACAUDAL; PERINEURAL ONCE
Status: DISCONTINUED | OUTPATIENT
Start: 2020-08-20 | End: 2020-08-20 | Stop reason: HOSPADM

## 2020-08-20 NOTE — ED PROVIDER NOTES
[facility] EMERGENCY DEPARTMENT ([facility])    8/20/20        History     Chief Complaint   Patient presents with     Dental Pain     wisdom teeth removed 8/19/20; inadequate pain control; came from group home     HPI  Ayana Prasad is a 30 year old female with a past medical history of schizoaffective disorder, bipolar 1 disorder, and ADHD who presents to the Emergency Department for dental pain.  Patient reports that she had her bilateral upper wisdom teeth removed yesterday.  Since then, she has been taking Tylenol and ibuprofen with only minimal pain relief.  She called her dentist office which was closed for the evening.  She denies any fevers, swelling, or drainage from the area.  No other symptoms or concerns.    I have reviewed the Medications, Allergies, Past Medical and Surgical History, and Social History in the [employer] system.  PAST MEDICAL HISTORY:   Past Medical History:   Diagnosis Date     ADHD (attention deficit hyperactivity disorder)      Bipolar 1 disorder      Borderline personality disorder      Cauda equina syndrome      Chronic low back pain      Depression      Depressive disorder      GERD (gastroesophageal reflux disease)      h/o TBI (traumatic brain injury)      Marginal corneal ulcer, left 7/17/2015     Nephrolithiasis      Polysubstance abuse - methamphetamine, hallucinagen, heroin, marijuana     currently in remission     PONV (postoperative nausea and vomiting)      PTSD (post-traumatic stress disorder)        PAST SURGICAL HISTORY:   Past Surgical History:   Procedure Laterality Date     BACK SURGERY - For Cauda Equina Syndrome  12/24/2016     COLONOSCOPY       COMBINED CYSTOSCOPY, INSERT STENT URETER(S) Left 8/30/2018    Procedure: COMBINED CYSTOSCOPY, INSERT STENT URETER(S);  Cystoscopy With Left Stent Placement;  Surgeon: Kiran Ulrich MD;  Location: [facility] OR     COMBINED CYSTOSCOPY, RETROGRADES, EXCHANGE STENT URETER(S) Left 10/14/2018    Procedure: COMBINED  CYSTOSCOPY, RETROGRADES, EXCHANGE STENT URETER(S);  Cystoscopy and removal of left-sided stent.;  Surgeon: Stiven Olivo MD;  Location:  OR     COMBINED CYSTOSCOPY, RETROGRADES, URETEROSCOPY, INSERT STENT  1/6/2014    Procedure: COMBINED CYSTOSCOPY, RETROGRADES, URETEROSCOPY, INSERT STENT;  Cystyoscopy place left ureteral stent;  Surgeon: Jaun Kimble MD;  Location: [facility] OR     COMBINED CYSTOSCOPY, RETROGRADES, URETEROSCOPY, INSERT STENT Left 10/23/2018    Procedure: Video cystoscopy, left ureteroscopy with stone extraction;  Surgeon: Bull Mast MD;  Location:  OR     CYSTOSCOPY, URETEROSCOPY, COMBINED Right 9/23/2015    Procedure: COMBINED CYSTOSCOPY, URETEROSCOPY;  Surgeon: ROME Jett MD;  Location: [facility] OR     ENT SURGERY       ESOPHAGOSCOPY, GASTROSCOPY, DUODENOSCOPY (EGD), COMBINED  4/8/2013    Procedure: COMBINED ESOPHAGOSCOPY, GASTROSCOPY, DUODENOSCOPY (EGD), BIOPSY SINGLE OR MULTIPLE;  Gastroscopy;  Surgeon: Peter Pickard MD;  Location: [facility] GI     ESOPHAGOSCOPY, GASTROSCOPY, DUODENOSCOPY (EGD), COMBINED Left 10/28/2014    Procedure: COMBINED ESOPHAGOSCOPY, GASTROSCOPY, DUODENOSCOPY (EGD), BIOPSY SINGLE OR MULTIPLE;  Surgeon: Narcisa Ramirez MD;  Location:  OR     ESOPHAGOSCOPY, GASTROSCOPY, DUODENOSCOPY (EGD), COMBINED N/A 12/24/2018    Procedure: COMBINED ESOPHAGOSCOPY, GASTROSCOPY, DUODENOSCOPY (EGD), BIOPSY SINGLE OR MULTIPLE;  Surgeon: Sonu Verduzco MD;  Location: [facility] GI     LAPAROSCOPIC CHOLECYSTECTOMY  11/20/2014    [facility], Dr. Ramirez     LASER HOLMIUM LITHOTRIPSY URETER(S), INSERT STENT, COMBINED  4/2/2014    Procedure: COMBINED CYSTOSCOPY, URETEROSCOPY, LASER HOLMIUM LITHOTRIPSY URETER(S), INSERT STENT;  Cystoscopy,Left Ureteral Stent Removal,Left Ureteroscopy with Laser Lithotripsy,Left Ureter Stent Placement;  Surgeon: ROME Jett MD;  Location: [facility] OR     Transurethral stone resection  03/11/2011       Past medical history, past surgical  history, medications, and allergies were reviewed with the patient. Additional pertinent items: None    FAMILY HISTORY:   Family History   Problem Relation Age of Onset     Gastrointestinal Disease Father         Crohn's Disease     Cancer Father         Liver Cancer     Other Cancer Father         Liver     Cancer Maternal Grandmother         lympoma     Diabetes Maternal Grandmother      Mental Illness Maternal Grandmother      Other Cancer Maternal Grandmother         Non Hodgkins Lymphoma     Diabetes Maternal Grandfather      Hypertension Maternal Grandfather      Prostate Cancer Maternal Grandfather      Hyperlipidemia Maternal Grandfather      Heart Disease Paternal Grandfather         heart disease     Hypertension Brother      Other Cancer Brother         Esophegeal cancer     Diabetes Brother      Hyperlipidemia Mother      Mental Illness Mother      Anxiety Disorder Mother      Anesthesia Reaction Mother      Hypertension Brother      Other Cancer Brother      Other Cancer Paternal Half-Brother         Esophageal       SOCIAL HISTORY:   Social History     Tobacco Use     Smoking status: Current Every Day Smoker     Packs/day: 0.50     Types: Cigarettes     Start date: 12/17/2019     Smokeless tobacco: Former User     Types: Chew     Quit date: 12/17/2019   Substance Use Topics     Alcohol use: No     Alcohol/week: 0.0 standard drinks     Social history was reviewed with the patient. Additional pertinent items: None      Discharge Medication List as of 8/20/2020  6:04 PM      CONTINUE these medications which have NOT CHANGED    Details   fluPHENAZine decanoate (PROLIXIN) 25 MG/ML injection Inject 1 mL (25 mg) into the muscle every 14 days, Disp-6 mL,R-0, E-Prescribe      gabapentin (NEURONTIN) 300 MG capsule Take 900 mg in AM and 1200 mg mid day., Disp-900 capsule,R-0, E-Prescribe      lithium ER (LITHOBID) 300 MG CR tablet Take one tab (1) each morning and three (3) tabs at bedtime, Disp-120 tablet,R-0,  E-Prescribe      !! QUEtiapine (SEROQUEL) 25 MG tablet Take 0.5 -1 tab (12.5 mg-25 mg) 1-2 times daily as needed, Disp-60 tablet,R-0, E-Prescribe      busPIRone (BUSPAR) 15 MG tablet Take 1 tablet (15 mg) by mouth 3 times daily, Disp-90 tablet,R-0, E-PrescribeAttempting taper off      clindamycin (CLEOCIN T) 1 % external solution Apply topically 2 times dailyDisp-60 mL,[id number]      methylPREDNISolone (MEDROL DOSEPAK) 4 MG tablet therapy pack Follow Package Directions, Disp-21 tablet,R-0, E-Prescribe      naproxen (NAPROSYN) 500 MG tablet TAKE 1 TABLET BY MOUTH TWICE A DAY WITH FOOD FOR 2 WEEKS THEN TAKE 1 TABLET BY MOUTH EVERY TWELVE HOURS AS NEEDED, Disp-60 tablet,R-6, E-PrescribeMaximum Refills Reached      neomycin-polymyxin-dexamethasone (MAXITROL) 3.5-17028-6.1 ophthalmic ointment Apply 1 drop in both eye three times per day. Use for 2 days after the redness is gone. Typically 5-7 days total., Disp-3.5 g,R-0, E-Prescribe      nicotine (NICORETTE) 2 MG gum Place 1 each (2 mg) inside cheek as needed for smoking cessation, Disp-100 each, R-1, E-Prescribe      norelgestromin-ethinyl estradiol (XULANE) 150-35 MCG/24HR patch Place 1 patch onto the skin once a week May use continuously, Disp-12 patch,R-4, E-Prescribe      ondansetron (ZOFRAN-ODT) 4 MG ODT tab Take 1 tablet (4 mg) by mouth every 8 hours as needed for nausea, Disp-30 tablet,R-0, E-Prescribe      !! QUEtiapine (SEROQUEL) 100 MG tablet Take 1 tablet (100 mg) by mouth At Bedtime, Disp-30 tablet,R-0, E-Prescribe      tobramycin-dexamethasone (TOBRADEX) 0.3-0.1 % ophthalmic suspension Apply 1 drop in both eye three times per day, for 7 days., Disp-1 Bottle,R-0, E-Prescribe       !! - Potential duplicate medications found. Please discuss with provider.             Allergies   Allergen Reactions     Haldol [Haloperidol] Other (See Comments)     Makes patient very angry and anxious     Adhesive Tape Hives     Percocet [Oxycodone-Acetaminophen] Nausea and  Vomiting     Prednisone Other (See Comments) and Hives     Suicidal ideation     Risperidone Other (See Comments)     Tramadol Hcl Nausea and Vomiting     Droperidol Anxiety     Seroquel [Quetiapine] Palpitations     Spent 2 weeks in the hospital due to having seroquel, caused palpitations and QT prolongation        Review of Systems  A complete review of systems was performed with pertinent positives and negatives noted in the HPI, and all other systems negative.    Physical Exam   BP: 124/80  Pulse: 74  Temp: 98.1  F (36.7  C)  Resp: 16  SpO2: 100 %      Physical Exam  Vitals signs and nursing note reviewed.   Constitutional:       General: She is not in acute distress.     Appearance: Normal appearance. She is not diaphoretic.   HENT:      Head: Normocephalic and atraumatic.      Mouth/Throat:      Mouth: Mucous membranes are moist.      Dentition: No dental tenderness or dental abscesses.      Pharynx: No oropharyngeal exudate.      Comments: Evidence of wisdom tooth removal - bilateral uppers - without empty socket, redness, or swelling.  Eyes:      General: No scleral icterus.     Pupils: Pupils are equal, round, and reactive to light.   Neck:      Musculoskeletal: Neck supple.   Cardiovascular:      Rate and Rhythm: Normal rate and regular rhythm.      Heart sounds: Normal heart sounds.   Pulmonary:      Effort: Pulmonary effort is normal. No respiratory distress.      Breath sounds: Normal breath sounds.   Abdominal:      General: Bowel sounds are normal.      Palpations: Abdomen is soft.      Tenderness: There is no abdominal tenderness.   Musculoskeletal:         General: No tenderness.   Skin:     General: Skin is warm.      Capillary Refill: Capillary refill takes less than 2 seconds.      Findings: No rash.   Neurological:      General: No focal deficit present.      Mental Status: She is alert and oriented to person, place, and time.   Psychiatric:         Mood and Affect: Mood normal.          Behavior: Behavior normal.         ED Course        [facility], [place]    Dental Block    Date/Time: 8/20/2020 6:02 PM  Performed by: Callie Paredes DO  Authorized by: Callie Paredes DO       INDICATIONS     Indications: post-dental procedure pain      LOCATION     Block type:  Posterior superior alveolar    Laterality:  Bilateral    PROCEDURE DETAILS     Syringe type:  Luer lock syringe    Needle gauge:  27 G    Anesthetic injected:  Bupivacaine 0.5% WITH epi    Injection procedure:  Anatomic landmarks identified, introduced needle, incremental injection, anatomic landmarks palpated and negative aspiration for blood    POST PROCEDURE DETAILS      Outcome:  Pain improved    PROCEDURE   Patient Tolerance:  Patient tolerated the procedure well with no immediate complications          No results found. However, due to the size of the patient record, not all encounters were searched. Please check Results Review for a complete set of results.  Medications   bupivacaine 0.5% - EPINEPHrine 1:200,000 (PF) injection 30 mL (has no administration in time range)          Assessments & Plan (with Medical Decision Making)   Patient was seen and examined.  Physical exam does not reveal evidence of abscess or dry socket.  Patient received bilateral dental blocks with good relief of symptoms.  She will be discharged home.  Encouraged to continue Tylenol and ibuprofen scheduled around the clock.  She will call her dental office in the morning for follow-up.  Discharged in stable condition.    I have reviewed the nursing notes.    I have reviewed the findings, diagnosis, plan and need for follow up with the patient.    Discharge Medication List as of 8/20/2020  6:04 PM          Final diagnoses:   Pain, dental   H/O wisdom tooth extraction, unspecified edentulism       8/20/2020   [facility], [place], EMERGENCY DEPARTMENT     Callie Paredes DO  08/20/20 1906

## 2020-08-20 NOTE — ED AVS SNAPSHOT
[facility], [place], Emergency Department  [address]  [facility] [address]  Phone:  [phone]  Fax:  [phone]                                    Ayana Prasad   MRN: [id number]    Department:  [facility], Emergency Department   Date of Visit:  8/20/2020           After Visit Summary Signature Page    I have received my discharge instructions, and my questions have been answered. I have discussed any challenges I see with this plan with the nurse or doctor.    ..........................................................................................................................................  Patient/Patient Representative Signature      ..........................................................................................................................................  Patient Representative Print Name and Relationship to Patient    ..................................................               ................................................  Date                                   Time    ..........................................................................................................................................  Reviewed by Signature/Title    ...................................................              ..............................................  Date                                               Time          22EPIC Rev 08/18

## 2020-08-20 NOTE — DISCHARGE INSTRUCTIONS
Please call your dentist first thing in the morning if pain continues to be uncontrolled. Continue scheduled Tylenol and ibuprofen (alternating every 3-4 hours).

## 2020-08-21 ENCOUNTER — TELEPHONE (OUTPATIENT)
Dept: FAMILY MEDICINE | Facility: CLINIC | Age: 30
End: 2020-08-21

## 2020-08-21 DIAGNOSIS — K08.89 PAIN, DENTAL: Primary | ICD-10-CM

## 2020-08-21 RX ORDER — ACETAMINOPHEN 500 MG
1000 TABLET ORAL EVERY 8 HOURS PRN
Qty: 30 TABLET | Refills: 0 | Status: SHIPPED | OUTPATIENT
Start: 2020-08-21 | End: 2020-11-27

## 2020-08-21 RX ORDER — IBUPROFEN 800 MG/1
800 TABLET, FILM COATED ORAL EVERY 8 HOURS PRN
Qty: 30 TABLET | Refills: 0 | Status: SHIPPED | OUTPATIENT
Start: 2020-08-21 | End: 2020-09-11

## 2020-08-21 NOTE — TELEPHONE ENCOUNTER
Prescription sent for ibuprofen 800 mg p.o. every 8 hours as needed with food and Tylenol 1000 mg p.o. every 8 hours as needed.    Becki JENKINS

## 2020-08-21 NOTE — TELEPHONE ENCOUNTER
Pt had wisdom teeth extracted 8/20 in ER. Wasn't ordered any pain meds as she is restricted. In a lot of pain, requesting medication to help. [facility] if ordered.    Thank you,    Nancy De La Cruz, [place]

## 2020-08-21 NOTE — TELEPHONE ENCOUNTER
[facility] called back. States pt on lithium and cannot take with ibuprofen. She did not mention this in her previous call.     Nancy De La Cruz, [place]

## 2020-09-03 DIAGNOSIS — E66.01 MORBID OBESITY (H): Primary | ICD-10-CM

## 2020-09-04 ENCOUNTER — OFFICE VISIT (OUTPATIENT)
Dept: FAMILY MEDICINE | Facility: CLINIC | Age: 30
End: 2020-09-04
Payer: COMMERCIAL

## 2020-09-04 VITALS
DIASTOLIC BLOOD PRESSURE: 84 MMHG | SYSTOLIC BLOOD PRESSURE: 122 MMHG | HEART RATE: 116 BPM | BODY MASS INDEX: 36.83 KG/M2 | WEIGHT: 229.2 LBS | RESPIRATION RATE: 20 BRPM | TEMPERATURE: 99.4 F | HEIGHT: 66 IN

## 2020-09-04 DIAGNOSIS — Z23 NEED FOR PROPHYLACTIC VACCINATION AND INOCULATION AGAINST INFLUENZA: ICD-10-CM

## 2020-09-04 DIAGNOSIS — Z23 NEED FOR PNEUMOCOCCAL VACCINATION: ICD-10-CM

## 2020-09-04 DIAGNOSIS — Z00.00 ROUTINE GENERAL MEDICAL EXAMINATION AT A HEALTH CARE FACILITY: Primary | ICD-10-CM

## 2020-09-04 DIAGNOSIS — Z23 NEED FOR HEPATITIS B VACCINATION: ICD-10-CM

## 2020-09-04 PROCEDURE — 90686 IIV4 VACC NO PRSV 0.5 ML IM: CPT | Performed by: NURSE PRACTITIONER

## 2020-09-04 PROCEDURE — 90471 IMMUNIZATION ADMIN: CPT | Performed by: NURSE PRACTITIONER

## 2020-09-04 PROCEDURE — 90746 HEPB VACCINE 3 DOSE ADULT IM: CPT | Performed by: NURSE PRACTITIONER

## 2020-09-04 PROCEDURE — 90732 PPSV23 VACC 2 YRS+ SUBQ/IM: CPT | Performed by: NURSE PRACTITIONER

## 2020-09-04 PROCEDURE — 99395 PREV VISIT EST AGE 18-39: CPT | Mod: 25 | Performed by: NURSE PRACTITIONER

## 2020-09-04 PROCEDURE — 90472 IMMUNIZATION ADMIN EACH ADD: CPT | Performed by: NURSE PRACTITIONER

## 2020-09-04 RX ORDER — ATOMOXETINE 40 MG/1
40 CAPSULE ORAL DAILY
COMMUNITY
End: 2020-11-04

## 2020-09-04 RX ORDER — QUETIAPINE FUMARATE 50 MG/1
50 TABLET, FILM COATED ORAL 2 TIMES DAILY
COMMUNITY
End: 2020-11-04

## 2020-09-04 ASSESSMENT — ENCOUNTER SYMPTOMS
SLEEP DISTURBANCE: 0
SHORTNESS OF BREATH: 0
POLYPHAGIA: 0
CONSTIPATION: 0
LIGHT-HEADEDNESS: 0
NUMBNESS: 0
ARTHRALGIAS: 0
HEADACHES: 0
WHEEZING: 0
NAUSEA: 0
DIZZINESS: 0
DIFFICULTY URINATING: 0
ABDOMINAL PAIN: 0
SORE THROAT: 0
COUGH: 0
DIARRHEA: 0
ACTIVITY CHANGE: 0
ABDOMINAL DISTENTION: 0
PALPITATIONS: 0
DYSPHORIC MOOD: 0
FREQUENCY: 0
RHINORRHEA: 0
POLYDIPSIA: 0
VOMITING: 0
NERVOUS/ANXIOUS: 0
CHEST TIGHTNESS: 0
FATIGUE: 0

## 2020-09-04 ASSESSMENT — MIFFLIN-ST. JEOR: SCORE: 1768.45

## 2020-09-04 ASSESSMENT — PAIN SCALES - GENERAL: PAINLEVEL: NO PAIN (0)

## 2020-09-04 NOTE — PATIENT INSTRUCTIONS
Have your fasting labs drawn. Fasting labs when you have not had anything to eat for 8 hours prior and the only thing to drink is water.         Preventive Health Recommendations  Female Ages 26 - 39  Yearly exam:   See your health care provider every year in order to    Review health changes.     Discuss preventive care.      Review your medicines if you your doctor has prescribed any.    Until age 30: Get a Pap test every three years (more often if you have had an abnormal result).    After age 30: Talk to your doctor about whether you should have a Pap test every 3 years or have a Pap test with HPV screening every 5 years.   You do not need a Pap test if your uterus was removed (hysterectomy) and you have not had cancer.  You should be tested each year for STDs (sexually transmitted diseases), if you're at risk.   Talk to your provider about how often to have your cholesterol checked.  If you are at risk for diabetes, you should have a diabetes test (fasting glucose).  Shots: Get a flu shot each year. Get a tetanus shot every 10 years.   Nutrition:     Eat at least 5 servings of fruits and vegetables each day.    Eat whole-grain bread, whole-wheat pasta and brown rice instead of white grains and rice.    Get adequate Calcium and Vitamin D.     Lifestyle    Exercise at least 150 minutes a week (30 minutes a day, 5 days of the week). This will help you control your weight and prevent disease.    Limit alcohol to one drink per day.    No smoking.     Wear sunscreen to prevent skin cancer.    See your dentist every six months for an exam and cleaning.

## 2020-09-04 NOTE — PROGRESS NOTES
SUBJECTIVE:   CC: Ayana Prasad is an 30 year old woman who presents for preventive health visit.     Chief Complaint   Patient presents with     Physical     pt is not fasting       Healthy Habits:    Do you get at least three servings of calcium containing foods daily (dairy, green leafy vegetables, etc.)? yes    Amount of exercise or daily activities, outside of work: 3 day(s) per week    Problems taking medications regularly No    Medication side effects: No    Have you had an eye exam in the past two years? no    Do you see a dentist twice per year? yes    Do you have sleep apnea, excessive snoring or daytime drowsiness?no      Today's PHQ-2 Score:   PHQ-2 ( 1999 Pfizer) 9/4/2020 6/3/2019   Q1: Little interest or pleasure in doing things 0 0   Q2: Feeling down, depressed or hopeless 0 0   PHQ-2 Score 0 0       Abuse: Current or Past(Physical, Sexual or Emotional)- No  Do you feel safe in your environment? Yes        Social History     Tobacco Use     Smoking status: Current Every Day Smoker     Packs/day: 0.50     Types: Cigarettes     Start date: 12/17/2019     Smokeless tobacco: Former User     Types: Chew     Quit date: 12/17/2019   Substance Use Topics     Alcohol use: No     Alcohol/week: 0.0 standard drinks     If you drink alcohol do you typically have >3 drinks per day or >7 drinks per week? No                     Reviewed orders with patient.  Reviewed health maintenance and updated orders accordingly - Yes  Current Outpatient Medications   Medication Sig Dispense Refill     acetaminophen (TYLENOL) 500 MG tablet Take 2 tablets (1,000 mg) by mouth every 8 hours as needed for mild pain (for pain) 30 tablet 0     atomoxetine (STRATTERA) 40 MG capsule Take 40 mg by mouth daily       fluPHENAZine decanoate (PROLIXIN) 25 MG/ML injection Inject 1 mL (25 mg) into the muscle every 14 days 6 mL 0     gabapentin (NEURONTIN) 300 MG capsule Take 900 mg in AM and 1200 mg mid day. 900 capsule 0     lithium ER  (LITHOBID) 300 MG CR tablet Take one tab (1) each morning and three (3) tabs at bedtime 120 tablet 0     norelgestromin-ethinyl estradiol (XULANE) 150-35 MCG/24HR patch Place 1 patch onto the skin once a week May use continuously 12 patch 4     ondansetron (ZOFRAN-ODT) 4 MG ODT tab Take 1 tablet (4 mg) by mouth every 8 hours as needed for nausea 30 tablet 0     QUEtiapine (SEROQUEL) 100 MG tablet Take 1 tablet (100 mg) by mouth At Bedtime 30 tablet 0     QUEtiapine (SEROQUEL) 50 MG tablet Take 50 mg by mouth 2 times daily       busPIRone (BUSPAR) 15 MG tablet Take 1 tablet (15 mg) by mouth 3 times daily (Patient not taking: Reported on 9/4/2020) 90 tablet 0     clindamycin (CLEOCIN T) 1 % external solution Apply topically 2 times daily (Patient not taking: Reported on 9/4/2020) 60 mL 2     ibuprofen (ADVIL/MOTRIN) 800 MG tablet Take 1 tablet (800 mg) by mouth every 8 hours as needed for moderate pain (for pain, with food) (Patient not taking: Reported on 9/4/2020) 30 tablet 0     methylPREDNISolone (MEDROL DOSEPAK) 4 MG tablet therapy pack Follow Package Directions (Patient not taking: Reported on 7/20/2020) 21 tablet 0     naproxen (NAPROSYN) 500 MG tablet TAKE 1 TABLET BY MOUTH TWICE A DAY WITH FOOD FOR 2 WEEKS THEN TAKE 1 TABLET BY MOUTH EVERY TWELVE HOURS AS NEEDED (Patient not taking: Reported on 7/20/2020) 60 tablet 6     neomycin-polymyxin-dexamethasone (MAXITROL) 3.5-86426-9.1 ophthalmic ointment Apply 1 drop in both eye three times per day. Use for 2 days after the redness is gone. Typically 5-7 days total. (Patient not taking: Reported on 8/12/2020) 3.5 g 0     nicotine (NICORETTE) 2 MG gum Place 1 each (2 mg) inside cheek as needed for smoking cessation (Patient not taking: Reported on 7/20/2020) 100 each 1     QUEtiapine (SEROQUEL) 25 MG tablet Take 0.5 -1 tab (12.5 mg-25 mg) 1-2 times daily as needed (Patient not taking: Reported on 9/4/2020) 60 tablet 0     tobramycin-dexamethasone (TOBRADEX) 0.3-0.1 %  ophthalmic suspension Apply 1 drop in both eye three times per day, for 7 days. (Patient not taking: Reported on 7/29/2020) 1 Bottle 0     Allergies   Allergen Reactions     Haldol [Haloperidol] Other (See Comments)     Makes patient very angry and anxious     Adhesive Tape Hives     Percocet [Oxycodone-Acetaminophen] Nausea and Vomiting     Prednisone Other (See Comments) and Hives     Suicidal ideation     Risperidone Other (See Comments)     Tramadol Hcl Nausea and Vomiting     Droperidol Anxiety     Seroquel [Quetiapine] Palpitations     Spent 2 weeks in the hospital due to having seroquel, caused palpitations and QT prolongation       Mammogram not appropriate for this patient based on age.    Pertinent mammograms are reviewed under the imaging tab.  History of abnormal Pap smear: NO - age 30-65 PAP every 5 years with negative HPV co-testing recommended  PAP / HPV Latest Ref Rng & Units 12/7/2018   PAP - NIL   HPV 16 DNA NEG:Negative Negative   HPV 18 DNA NEG:Negative Negative   OTHER HR HPV NEG:Negative Negative     Reviewed and updated as needed this visit by clinical staff  Tobacco  Allergies  Meds  Med Hx  Surg Hx  Fam Hx  Soc Hx        Reviewed and updated as needed this visit by Provider          Here today for physical.  Is not fasting for labs.  Recently joined  and has been going to meetings.  Feels very good about making this decision.  Continues to smoke.  No intention of quitting.    Divorce with wife Sergio is not finalized yet.  They are at a standstill due to course being closed.    Uses the patch to control periods. Uses it continuously. Never has any bleeding.  Feels like it is working well.  No side effects that is aware of.    Recently met with bariatrics.  Reports has been prescribed Topamax.  Has not started taking it yet.      Last Pap: 2018 normal  Last mammogram: never, no family history of breast cancer   Last BMD: N/A  Last Colonoscopy: 2011-normal. no family history of colon  "cancer   Last eye exam: Some time  Last dental exam: Dentist.   Last tetanus vaccine: 2014  Last influenza vaccine: Plans to get here today  Last shingles vaccine: N/A  Last pneumonia vaccine: Plans to get here today  Hep C screen (born [birth year]): N/A  HIV screen: Done   AAA screen (age 65-78 with smoking hx): N/A  IVD (HTN, Hyperlipid, Smoking): N/A  Lung CA screening (55-80, 30 pk smoking hx): N/A    ROS:  Review of Systems   Constitutional: Negative for activity change and fatigue.   HENT: Negative for ear pain, rhinorrhea and sore throat.    Respiratory: Negative for cough, chest tightness, shortness of breath and wheezing.    Cardiovascular: Negative for chest pain, palpitations and leg swelling.   Gastrointestinal: Negative for abdominal distention, abdominal pain, constipation, diarrhea, nausea and vomiting.   Endocrine: Negative for cold intolerance, heat intolerance, polydipsia, polyphagia and polyuria.   Genitourinary: Negative for difficulty urinating, enuresis, frequency and urgency.   Musculoskeletal: Negative for arthralgias.   Skin: Negative for rash.   Neurological: Negative for dizziness, light-headedness, numbness and headaches.   Psychiatric/Behavioral: Negative for dysphoric mood and sleep disturbance. The patient is not nervous/anxious.          OBJECTIVE:   /84 (BP Location: Left arm, Patient Position: Sitting, Cuff Size: Adult Large)   Pulse 116   Temp 99.4  F (37.4  C) (Tympanic)   Resp 20   Ht 1.664 m (5' 5.5\")   Wt 104 kg (229 lb 3.2 oz)   LMP  (LMP Unknown)   BMI 37.56 kg/m    EXAM:  Physical Exam  Constitutional:       Appearance: Normal appearance. She is well-developed.   HENT:      Head: Normocephalic and atraumatic.      Right Ear: Tympanic membrane and external ear normal. No middle ear effusion.      Left Ear: Tympanic membrane and external ear normal.  No middle ear effusion.      Nose: No mucosal edema.      Mouth/Throat:      Pharynx: Uvula midline.   Eyes:      " Pupils: Pupils are equal, round, and reactive to light.   Neck:      Musculoskeletal: Normal range of motion.      Thyroid: No thyromegaly.      Vascular: No carotid bruit.   Cardiovascular:      Rate and Rhythm: Normal rate and regular rhythm.      Pulses:           Femoral pulses are 2+ on the right side and 2+ on the left side.     Heart sounds: Normal heart sounds.   Pulmonary:      Effort: Pulmonary effort is normal.      Breath sounds: Normal breath sounds.   Abdominal:      General: Bowel sounds are normal.      Palpations: Abdomen is soft.      Tenderness: There is no abdominal tenderness.   Musculoskeletal: Normal range of motion.   Skin:     General: Skin is warm and dry.      Findings: No rash.   Neurological:      Mental Status: She is alert.   Psychiatric:         Behavior: Behavior normal.           ASSESSMENT/PLAN:   1. Routine general medical examination at a health care facility  Screening guidelines reviewed.   - Lipid panel reflex to direct LDL Fasting; Future  - TSH with free T4 reflex; Future  - Vitamin D Deficiency; Future  - Basic metabolic panel; Future  - Hemoglobin A1c; Future    2. Need for prophylactic vaccination and inoculation against influenza  Administered today in clinic  - INFLUENZA VACCINE IM > 6 MONTHS VALENT IIV4 [77556]  - Vaccine Administration, Initial [51272]    3. Need for hepatitis B vaccination  Administered today in clinic  - VACCINE ADMINISTRATION, EACH ADDITIONAL  - HEPATITIS B VACCINE,ADULT,IM    4. Need for pneumococcal vaccination  Administered today in clinic  - VACCINE ADMINISTRATION, EACH ADDITIONAL  - PNEUMOCOCCAL VACCINE,ADULT,SQ OR IM    Congratulated on joining [facility].  Continue to follow with mental health.    COUNSELING:   Reviewed preventive health counseling, as reflected in patient instructions       Regular exercise       Healthy diet/nutrition       Safe sex practices/STD prevention       Colon cancer screening    Estimated body mass index is 37.56 kg/m   "as calculated from the following:    Height as of this encounter: 1.664 m (5' 5.5\").    Weight as of this encounter: 104 kg (229 lb 3.2 oz).    Weight management plan: Specific weight management program called and discussed    She reports that she has been smoking cigarettes. She started smoking about 8 months ago. She has been smoking about 0.50 packs per day. She quit smokeless tobacco use about 8 months ago.  Her smokeless tobacco use included chew.  Tobacco Cessation Action Plan:   Information offered: Patient not interested at this time      Counseling Resources:  ATP IV Guidelines  Pooled Cohorts Equation Calculator  Breast Cancer Risk Calculator  BRCA-Related Cancer Risk Assessment: FHS-7 Tool  FRAX Risk Assessment  ICSI Preventive Guidelines  Dietary Guidelines for Americans, 2010  USDA's MyPlate  ASA Prophylaxis  Lung CA Screening    BROOKLYN Cruz [facility] SHAILESH  "

## 2020-09-08 DIAGNOSIS — F25.0 SCHIZOAFFECTIVE DISORDER, BIPOLAR TYPE (H): ICD-10-CM

## 2020-09-09 ENCOUNTER — MYC MEDICAL ADVICE (OUTPATIENT)
Dept: FAMILY MEDICINE | Facility: CLINIC | Age: 30
End: 2020-09-09

## 2020-09-11 DIAGNOSIS — M54.9 INTRACTABLE BACK PAIN: ICD-10-CM

## 2020-09-11 DIAGNOSIS — F41.9 ANXIETY: ICD-10-CM

## 2020-09-11 DIAGNOSIS — F31.11 BIPOLAR 1 DISORDER, MANIC, MILD (H): ICD-10-CM

## 2020-09-11 RX ORDER — GABAPENTIN 300 MG/1
CAPSULE ORAL
Qty: 300 CAPSULE | Refills: 11 | Status: SHIPPED | OUTPATIENT
Start: 2020-09-11 | End: 2020-12-11

## 2020-09-14 RX ORDER — FLUPHENAZINE DECANOATE 25 MG/ML
INJECTION, SOLUTION INTRAMUSCULAR; SUBCUTANEOUS
Qty: 6 ML | Refills: 0 | Status: SHIPPED | OUTPATIENT
Start: 2020-09-14 | End: 2021-10-28

## 2020-09-14 NOTE — TELEPHONE ENCOUNTER
Last seen: 7/22  RTC: 7-8 weeks   Cancel: 9/16  No-show: none   Next appt: none     Incoming refill from pharmacy via Rx Auth     Medication requested:fluPHENAZine decanoate (PROLIXIN) 25 MG/ML injection   Directions: Inject 1 mL (25 mg) into the muscle every 14 days - Intramuscular   Qty: 6 ML   Last refilled: 7/14    Will route to provider for approval

## 2020-09-23 DIAGNOSIS — F25.0 SCHIZOAFFECTIVE DISORDER, BIPOLAR TYPE (H): ICD-10-CM

## 2020-09-25 NOTE — TELEPHONE ENCOUNTER
Last seen: 7/22  RTC: 7-8 weeks   Cancel: 9/16  No-show: none   Next appt: none     Incoming refill from pharmacy via Rx Auth     Medication requested: QUEtiapine (SEROQUEL) 100 MG tablet  Directions: Take 1 tablet (100 mg) by mouth At Bedtime - Oral  Qty: 30  Last refilled: 8/9    Writer placed a call to patient to confirm if she will be continuing care with Ashlyn or will be transferred to a different provider per last note at the appt. No answer at number provided. LVM, requesting a call back. Clinic number provided.

## 2020-09-28 RX ORDER — QUETIAPINE FUMARATE 100 MG/1
TABLET, FILM COATED ORAL
Qty: 30 TABLET | Refills: 0 | OUTPATIENT
Start: 2020-09-28

## 2020-09-28 NOTE — TELEPHONE ENCOUNTER
Medication Refill (Seroquel )     Ashlyn Fields, [place] CNP  You 2 hours ago (8:00 AM)       I'm not sure. She's pretty responsive to messages if you want to try that?    Message text     - MyChart message sent to the patient to confirm the plan to continue care at this clinic or will be transferred to a new clinic

## 2020-09-28 NOTE — TELEPHONE ENCOUNTER
refill     Ashlyn Fields APRN CNP  You 37 minutes ago (12:53 PM)       Yes please!    Message text       You  Ashlyn Fields APRN CNP 1 hour ago (12:09 PM)       ARTHUR Abdullahi! Patient did transfer out. I will refuse the refill for seroquel?       Thanks,   Rosy ESPINO     Routing comment      - Meds refused per providers request

## 2020-10-20 ENCOUNTER — ANCILLARY PROCEDURE (OUTPATIENT)
Dept: MRI IMAGING | Facility: CLINIC | Age: 30
End: 2020-10-20
Attending: NURSE PRACTITIONER
Payer: COMMERCIAL

## 2020-10-20 DIAGNOSIS — R20.0 FACIAL NUMBNESS: ICD-10-CM

## 2020-10-20 PROCEDURE — A9585 GADOBUTROL INJECTION: HCPCS | Performed by: RADIOLOGY

## 2020-10-20 PROCEDURE — 70553 MRI BRAIN STEM W/O & W/DYE: CPT | Mod: TC

## 2020-10-20 RX ORDER — GADOBUTROL 604.72 MG/ML
10 INJECTION INTRAVENOUS ONCE
Status: COMPLETED | OUTPATIENT
Start: 2020-10-20 | End: 2020-10-20

## 2020-10-20 RX ADMIN — GADOBUTROL 10 ML: 604.72 INJECTION INTRAVENOUS at 14:58

## 2020-10-21 DIAGNOSIS — R20.0 FACIAL NUMBNESS: Primary | ICD-10-CM

## 2020-10-21 DIAGNOSIS — R93.0 ABNORMAL MRI OF HEAD: ICD-10-CM

## 2020-11-02 DIAGNOSIS — R11.0 NAUSEA: ICD-10-CM

## 2020-11-02 NOTE — TELEPHONE ENCOUNTER
Pt is calling and states that she needs a refill on her zofran medication, please advise.        pt also wants to talk to a nurse because she has been vomiting once a day  For the last two weeks and she is wondering if she needs to be seen, please call to triage.     Aubrie Lala, [place]

## 2020-11-03 RX ORDER — ONDANSETRON 4 MG/1
4 TABLET, ORALLY DISINTEGRATING ORAL EVERY 8 HOURS PRN
Qty: 30 TABLET | Refills: 0 | Status: SHIPPED | OUTPATIENT
Start: 2020-11-03 | End: 2020-11-23

## 2020-11-03 NOTE — TELEPHONE ENCOUNTER
Spoke with patient regarding daily vomiting episodes for 2 weeks. Patient reports history of this, was seen in ED on 7/11/20 for similar symptoms.  Denies abdominal pain.  Patient is eating and drinking.    Virtual appointment scheduled with ORIN Avery on 11/4    Sherley Nava RN

## 2020-11-04 ENCOUNTER — VIRTUAL VISIT (OUTPATIENT)
Dept: FAMILY MEDICINE | Facility: CLINIC | Age: 30
End: 2020-11-04
Payer: COMMERCIAL

## 2020-11-04 DIAGNOSIS — R11.2 NON-INTRACTABLE VOMITING WITH NAUSEA, UNSPECIFIED VOMITING TYPE: Primary | ICD-10-CM

## 2020-11-04 PROCEDURE — 99214 OFFICE O/P EST MOD 30 MIN: CPT | Mod: 95 | Performed by: NURSE PRACTITIONER

## 2020-11-04 RX ORDER — LISDEXAMFETAMINE DIMESYLATE 40 MG/1
40 CAPSULE ORAL EVERY MORNING
COMMUNITY
End: 2021-02-04 | Stop reason: ALTCHOICE

## 2020-11-04 RX ORDER — PROPRANOLOL HYDROCHLORIDE 20 MG/1
20 TABLET ORAL 3 TIMES DAILY
COMMUNITY
End: 2020-12-13

## 2020-11-04 ASSESSMENT — ENCOUNTER SYMPTOMS
LIGHT-HEADEDNESS: 0
EYE DISCHARGE: 0
SHORTNESS OF BREATH: 0
CONSTIPATION: 0
FATIGUE: 0
NAUSEA: 1
FEVER: 0
VOMITING: 1
CHILLS: 0
DIAPHORESIS: 0
SORE THROAT: 0
DIARRHEA: 0
DIZZINESS: 0
HEADACHES: 0
RHINORRHEA: 0
WHEEZING: 0
SINUS PRESSURE: 0
EYE ITCHING: 0
COUGH: 0

## 2020-11-04 NOTE — PROGRESS NOTES
"Ayana Prasad is a 30 year old female who is being evaluated via a billable telephone visit.      The patient has been notified of following:     \"This telephone visit will be conducted via a call between you and your physician/provider. We have found that certain health care needs can be provided without the need for a physical exam.  This service lets us provide the care you need with a short phone conversation.  If a prescription is necessary we can send it directly to your pharmacy.  If lab work is needed we can place an order for that and you can then stop by our lab to have the test done at a later time.    Telephone visits are billed at different rates depending on your insurance coverage. During this emergency period, for some insurers they may be billed the same as an in-person visit.  Please reach out to your insurance provider with any questions.    If during the course of the call the physician/provider feels a telephone visit is not appropriate, you will not be charged for this service.\"    Patient has given verbal consent for Telephone visit?  Yes    What phone number would you like to be contacted at? [phone]    How would you like to obtain your AVS? Regina Jin     Ayana Prasad is a 30 year old female who presents via phone visit today for the following health issues:    HPI      Following up on: vomiting    Last visit this was discussed: July in the ED    Progression of Symptoms:  same    Accompanying Signs & Symptoms: daily vomiting episodes for 2 weeks now and nausea.         Patient reports history of this, was seen in ED on 7/11/20 for similar symptoms. Denies abdominal pain. Patient is eating and drinking.    Taking Medication ; zofran, Maalox             Phone call completed due to COVID-19 outbreak.     Has been vomiting mainly in the AM. Will vomit 1-2 times. Does feel nauseated during the day. This current episode has been going on for the last 2 weeks. No blood in vomit. " Wakes up feeling nauseated. Not currently using any illicit drugs. Is currently living at sober house in [facility]. Thinks that last time that lithium level was checked was in April. Is not currently sexually active. Is not taking Zofran very often. No constipation. Is able to eat and drink the rest of the day. Has had upper endoscopy in the past.  Last time was in 2018.  Was unremarkable.  At that time was having epigastric pain and vomiting as well.    Review of Systems   Constitutional: Negative for chills, diaphoresis, fatigue and fever.   HENT: Negative for ear discharge, ear pain, hearing loss, rhinorrhea, sinus pressure and sore throat.    Eyes: Negative for discharge and itching.   Respiratory: Negative for cough, shortness of breath and wheezing.    Gastrointestinal: Positive for nausea and vomiting. Negative for constipation and diarrhea.   Skin: Negative for rash.   Neurological: Negative for dizziness, light-headedness and headaches.          Objective          Vitals:  No vitals were obtained today due to virtual visit.    healthy, alert and no distress  PSYCH: Alert and oriented times 3; coherent speech, normal   rate and volume, able to articulate logical thoughts, able   to abstract reason, no tangential thoughts, no hallucinations   or delusions  Her affect is normal and pleasant  RESP: No cough, no audible wheezing, able to talk in full sentences  Remainder of exam unable to be completed due to telephone visits        Assessment/Plan:    Assessment & Plan     Non-intractable vomiting with nausea, unspecified vomiting type  We will check labs.  We will rule out UTI.  Would recommend taking Zofran daily upon weekend.  If nausea and vomiting continues consider repeating upper endoscopy..  - Lithium level; Future  - Basic metabolic panel  (Ca, Cl, CO2, Creat, Gluc, K, Na, BUN); Future  - UA reflex to Microscopic and Culture; Future        No follow-ups on file.    BROOKLYN Cruz [facility]  [facility] SHAILESH    Phone call duration:  9 minutes

## 2020-11-08 ENCOUNTER — HOSPITAL ENCOUNTER (EMERGENCY)
Facility: CLINIC | Age: 30
Discharge: HOME | End: 2020-11-08
Attending: EMERGENCY MEDICINE | Admitting: EMERGENCY MEDICINE
Payer: COMMERCIAL

## 2020-11-08 ENCOUNTER — APPOINTMENT (OUTPATIENT)
Dept: GENERAL RADIOLOGY | Facility: CLINIC | Age: 30
End: 2020-11-08
Attending: EMERGENCY MEDICINE
Payer: COMMERCIAL

## 2020-11-08 VITALS
DIASTOLIC BLOOD PRESSURE: 81 MMHG | BODY MASS INDEX: 35.9 KG/M2 | SYSTOLIC BLOOD PRESSURE: 126 MMHG | HEART RATE: 93 BPM | HEIGHT: 67 IN | TEMPERATURE: 98.6 F | OXYGEN SATURATION: 95 % | RESPIRATION RATE: 18 BRPM

## 2020-11-08 DIAGNOSIS — S50.01XA CONTUSION OF RIGHT ELBOW, INITIAL ENCOUNTER: ICD-10-CM

## 2020-11-08 PROCEDURE — 73080 X-RAY EXAM OF ELBOW: CPT | Mod: RT

## 2020-11-08 PROCEDURE — 99283 EMERGENCY DEPT VISIT LOW MDM: CPT | Performed by: EMERGENCY MEDICINE

## 2020-11-08 NOTE — ED AVS SNAPSHOT
[facility] Emergency Department  [address]  [place] [address]  Phone: [phone]  Fax: [phone]                                    Ayana Prasad   MRN: [id number]    Department: [facility] Emergency Department   Date of Visit: 11/8/2020           After Visit Summary Signature Page    I have received my discharge instructions, and my questions have been answered. I have discussed any challenges I see with this plan with the nurse or doctor.    ..........................................................................................................................................  Patient/Patient Representative Signature      ..........................................................................................................................................  Patient Representative Print Name and Relationship to Patient    ..................................................               ................................................  Date                                   Time    ..........................................................................................................................................  Reviewed by Signature/Title    ...................................................              ..............................................  Date                                               Time          22EPIC Rev 08/18

## 2020-11-09 NOTE — DISCHARGE INSTRUCTIONS
Use ice for comfort over the next 24 hours.    May use a sling on your right arm for comfort over the next 2 days    Take ibuprofen 600 mg 3 times daily for the next 5 days.    Please make an appointment to follow up with Your Primary Care Provider in 5-7 days as needed.

## 2020-11-09 NOTE — ED PROVIDER NOTES
[facility] EMERGENCY DEPARTMENT ([facility])     November 8, 2020  History     Chief Complaint   Patient presents with     Fall     reported, she tripped and fell 20 minutes ago. Hit right elbow against concrete.      HPI  Ayana Prasad is a 30 year old female with a PMH of cauda equina syndrome, neurogenic bladder, cannabis abuse, severe opioid abuse disorder, left ovarian cyst, nephrolithiasis, bipolar 1, schizoaffective disorder, BPD, depression, ADHD, anxiety, suicidal ideation, and TBI who presents the ED today complaining of right elbow pain after a fall.     I have reviewed the Medications, Allergies, Past Medical and Surgical History, and Social History in the [employer] system.    PAST MEDICAL HISTORY:   Past Medical History:   Diagnosis Date     ADHD (attention deficit hyperactivity disorder)      Bipolar 1 disorder      Borderline personality disorder      Cauda equina syndrome      Chronic low back pain      Depression      Depressive disorder      GERD (gastroesophageal reflux disease)      h/o TBI (traumatic brain injury)      Marginal corneal ulcer, left 7/17/2015     Nephrolithiasis      Polysubstance abuse - methamphetamine, hallucinagen, heroin, marijuana     currently in remission     PONV (postoperative nausea and vomiting)      PTSD (post-traumatic stress disorder)        PAST SURGICAL HISTORY:   Past Surgical History:   Procedure Laterality Date     BACK SURGERY - For Cauda Equina Syndrome  12/24/2016     COLONOSCOPY       COMBINED CYSTOSCOPY, INSERT STENT URETER(S) Left 8/30/2018    Procedure: COMBINED CYSTOSCOPY, INSERT STENT URETER(S);  Cystoscopy With Left Stent Placement;  Surgeon: Kiran Ulrich MD;  Location: [facility]     COMBINED CYSTOSCOPY, RETROGRADES, EXCHANGE STENT URETER(S) Left 10/14/2018    Procedure: COMBINED CYSTOSCOPY, RETROGRADES, EXCHANGE STENT URETER(S);  Cystoscopy and removal of left-sided stent.;  Surgeon: Stiven Olivo MD;  Location: [facility]      COMBINED CYSTOSCOPY, RETROGRADES, URETEROSCOPY, INSERT STENT  1/6/2014    Procedure: COMBINED CYSTOSCOPY, RETROGRADES, URETEROSCOPY, INSERT STENT;  Cystyoscopy place left ureteral stent;  Surgeon: Jaun Kimble MD;  Location: [facility]     COMBINED CYSTOSCOPY, RETROGRADES, URETEROSCOPY, INSERT STENT Left 10/23/2018    Procedure: Video cystoscopy, left ureteroscopy with stone extraction;  Surgeon: Bull Mast MD;  Location:  OR     CYSTOSCOPY, URETEROSCOPY, COMBINED Right 9/23/2015    Procedure: COMBINED CYSTOSCOPY, URETEROSCOPY;  Surgeon: ROME Jett MD;  Location: [facility]     ENT SURGERY       ESOPHAGOSCOPY, GASTROSCOPY, DUODENOSCOPY (EGD), COMBINED  4/8/2013    Procedure: COMBINED ESOPHAGOSCOPY, GASTROSCOPY, DUODENOSCOPY (EGD), BIOPSY SINGLE OR MULTIPLE;  Gastroscopy;  Surgeon: Peter Picakrd MD;  Location: [facility]     ESOPHAGOSCOPY, GASTROSCOPY, DUODENOSCOPY (EGD), COMBINED Left 10/28/2014    Procedure: COMBINED ESOPHAGOSCOPY, GASTROSCOPY, DUODENOSCOPY (EGD), BIOPSY SINGLE OR MULTIPLE;  Surgeon: Narcisa Ramirez MD;  Location:  OR     ESOPHAGOSCOPY, GASTROSCOPY, DUODENOSCOPY (EGD), COMBINED N/A 12/24/2018    Procedure: COMBINED ESOPHAGOSCOPY, GASTROSCOPY, DUODENOSCOPY (EGD), BIOPSY SINGLE OR MULTIPLE;  Surgeon: Sonu Verduzco MD;  Location: [facility]     LAPAROSCOPIC CHOLECYSTECTOMY  11/20/2014    [facility], Dr. Ramirez     LASER HOLMIUM LITHOTRIPSY URETER(S), INSERT STENT, COMBINED  4/2/2014    Procedure: COMBINED CYSTOSCOPY, URETEROSCOPY, LASER HOLMIUM LITHOTRIPSY URETER(S), INSERT STENT;  Cystoscopy,Left Ureteral Stent Removal,Left Ureteroscopy with Laser Lithotripsy,Left Ureter Stent Placement;  Surgeon: ROME Jett MD;  Location: [facility] OR     Transurethral stone resection  03/11/2011       Past medical history, past surgical history, medications, and allergies were reviewed with the patient. Additional pertinent items: None    FAMILY HISTORY:   Family History   Problem Relation Age  of Onset     Gastrointestinal Disease Father         Crohn's Disease     Cancer Father         Liver Cancer     Other Cancer Father         Liver     Cancer Maternal Grandmother         lympoma     Diabetes Maternal Grandmother      Mental Illness Maternal Grandmother      Other Cancer Maternal Grandmother         Non Hodgkins Lymphoma     Diabetes Maternal Grandfather      Hypertension Maternal Grandfather      Prostate Cancer Maternal Grandfather      Hyperlipidemia Maternal Grandfather      Heart Disease Paternal Grandfather         heart disease     Hypertension Brother      Other Cancer Brother         Esophagecial     Diabetes Brother      Hyperlipidemia Mother      Mental Illness Mother      Anxiety Disorder Mother      Anesthesia Reaction Mother         Vomiting/Nausea     Hypertension Brother      Other Cancer Brother      Other Cancer Paternal Half-Brother         Esophageal       SOCIAL HISTORY:   Social History     Tobacco Use     Smoking status: Current Every Day Smoker     Packs/day: 0.50     Years: 5.00     Pack years: 2.50     Types: Dip, chew, snus or snuff, Other     Start date: 8/1/2011     Smokeless tobacco: Current User     Types: Chew     Last attempt to quit: 12/17/2019   Substance Use Topics     Alcohol use: No     Alcohol/week: 0.0 standard drinks     Social history was reviewed with the patient. Additional pertinent items: None      Patient's Medications   New Prescriptions    No medications on file   Previous Medications    ACETAMINOPHEN (TYLENOL) 500 MG TABLET    Take 2 tablets (1,000 mg) by mouth every 8 hours as needed for mild pain (for pain)    FLUPHENAZINE DECANOATE (PROLIXIN) 25 MG/ML INJECTION    INJECT 1ML (25MG) INTRAMUSCULARLY EVERY 14 DAYS    GABAPENTIN (NEURONTIN) 300 MG CAPSULE    Take 900 mg in AM and 1200 mg mid day and 900 mg in PM    LISDEXAMFETAMINE (VYVANSE) 30 MG CAPSULE    Take 30 mg by mouth every morning    LITHIUM ER (LITHOBID) 300 MG CR TABLET    Take one tab (1)  "each morning and three (3) tabs at bedtime    NORELGESTROMIN-ETHINYL ESTRADIOL (XULANE) 150-35 MCG/24HR PATCH    Place 1 patch onto the skin once a week May use continuously    ONDANSETRON (ZOFRAN-ODT) 4 MG ODT TAB    Take 1 tablet (4 mg) by mouth every 8 hours as needed for nausea    PROPRANOLOL (INDERAL) 20 MG TABLET    Take 20 mg by mouth 2 times daily    QUETIAPINE (SEROQUEL) 100 MG TABLET    Take 1 tablet (100 mg) by mouth At Bedtime   Modified Medications    No medications on file   Discontinued Medications    No medications on file          Allergies   Allergen Reactions     Haldol [Haloperidol] Other (See Comments)     Makes patient very angry and anxious     Adhesive Tape Hives     Percocet [Oxycodone-Acetaminophen] Nausea and Vomiting     Prednisone Other (See Comments) and Hives     Suicidal ideation     Risperidone Other (See Comments)     Tramadol Hcl Nausea and Vomiting     Droperidol Anxiety     Seroquel [Quetiapine] Palpitations     Spent 2 weeks in the hospital due to having seroquel, caused palpitations and QT prolongation        Review of Systems  A complete review of systems was performed with pertinent positives and negatives noted in the HPI, and all other systems negative.    Physical Exam   BP: 126/81  Pulse: 93  Temp: 98.6  F (37  C)  Resp: 18  Height: 170.2 cm (5' 7\")  SpO2: 95 %  /81   Pulse 93   Temp 98.6  F (37  C) (Oral)   Resp 18   Ht 1.702 m (5' 7\")   LMP  (LMP Unknown)   SpO2 95%   BMI 35.90 kg/m      Physical Exam  Vitals signs and nursing note reviewed.   Constitutional:       Comments: Alert conversant pleasant   HENT:      Head: Atraumatic.   Musculoskeletal:      Comments: Patient had a contusion to the right elbow along the olecranon process with good range of motion and no shoulder tenderness no wrist tenderness no hand tenderness and distal CMS was intact.  Pronation supination intact.   Neurological:      General: No focal deficit present.      Mental Status: " She is oriented to person, place, and time.   Psychiatric:         Mood and Affect: Mood normal.         ED Course        Procedures           Results for orders placed or performed during the hospital encounter of 11/08/20 (from the past 24 hour(s))   Elbow XR, G/E 3 views, right    Narrative    EXAM: XR ELBOW RT G/E 3 VW  LOCATION: [facility] [place]  DATE/TIME: 11/8/2020 8:48 PM    INDICATION: Right elbow pain following fall.  COMPARISON: None.      Impression    IMPRESSION: Normal joint spaces and alignment. No fracture or joint effusion.     *Note: Due to a large number of results and/or encounters for the requested time period, some results have not been displayed. A complete set of results can be found in Results Review.          Assessments & Plan (with Medical Decision Making)     I have reviewed the nursing notes.    I have reviewed the findings, diagnosis, plan and need for follow up with the patient.    New Prescriptions    No medications on file       Final diagnoses:   Contusion of right elbow, initial encounter     Use ice for comfort over the next 24 hours.    May use a sling on your right arm for comfort over the next 2 days    Take ibuprofen 600 mg 3 times daily for the next 5 days.    Please make an appointment to follow up with Your Primary Care Provider in 5-7 days as needed.    Routine discharge instructions were given for this diagnosis    Santy Beasley MD, MD      11/8/2020   [facility] EMERGENCY DEPARTMENT     Santy Beasley MD  11/08/20 0287

## 2020-11-12 ENCOUNTER — OFFICE VISIT (OUTPATIENT)
Dept: FAMILY MEDICINE | Facility: CLINIC | Age: 30
End: 2020-11-12
Payer: COMMERCIAL

## 2020-11-12 VITALS
DIASTOLIC BLOOD PRESSURE: 82 MMHG | HEART RATE: 72 BPM | BODY MASS INDEX: 36.57 KG/M2 | TEMPERATURE: 97.8 F | SYSTOLIC BLOOD PRESSURE: 120 MMHG | HEIGHT: 67 IN | WEIGHT: 233 LBS | RESPIRATION RATE: 16 BRPM

## 2020-11-12 DIAGNOSIS — H60.12 CELLULITIS OF LEFT EAR: Primary | ICD-10-CM

## 2020-11-12 PROCEDURE — 99213 OFFICE O/P EST LOW 20 MIN: CPT | Performed by: NURSE PRACTITIONER

## 2020-11-12 RX ORDER — IBUPROFEN 800 MG/1
800 TABLET, FILM COATED ORAL EVERY 8 HOURS PRN
Qty: 60 TABLET | Refills: 1 | Status: SHIPPED | OUTPATIENT
Start: 2020-11-12 | End: 2021-03-19

## 2020-11-12 RX ORDER — CEPHALEXIN 500 MG/1
500 CAPSULE ORAL 4 TIMES DAILY
Qty: 40 CAPSULE | Refills: 0 | Status: SHIPPED | OUTPATIENT
Start: 2020-11-12 | End: 2020-11-22

## 2020-11-12 ASSESSMENT — ENCOUNTER SYMPTOMS
CONSTIPATION: 0
DIARRHEA: 0
COUGH: 0
SORE THROAT: 0
HEADACHES: 0
SINUS PRESSURE: 0
LIGHT-HEADEDNESS: 0
NAUSEA: 0
CHILLS: 0
WHEEZING: 0
RHINORRHEA: 0
EYE ITCHING: 0
FEVER: 0
SHORTNESS OF BREATH: 0
DIAPHORESIS: 0
EYE DISCHARGE: 0
FATIGUE: 0
DIZZINESS: 0

## 2020-11-12 ASSESSMENT — MIFFLIN-ST. JEOR: SCORE: 1809.51

## 2020-11-12 NOTE — PROGRESS NOTES
"Subjective     Ayana Prasad is a 30 year old female who presents to clinic today for the following health issues:    HPI         Ear Piercing Infection  Onset/Duration: 2 days  Description  Location: Left ear on cartilage   Character: painful, red, swelling  Itching: no  Intensity:  moderate  Progression of Symptoms:  same  Accompanying signs and symptoms:   Fever: no  Body aches or joint pain: no  History:           Previous episodes of similar problem: None  Precipitating or alleviating factors: None  Therapies tried and outcome: Ibuprofen       Self pierced ear on left side about 2 days ago. Ear is red and swollen and hurts. Tried to do 4 piecing's at once. No fevers. No drainage. No new areas of aching. Has been taking some ibuprofen and that does help to take the edge off it.     Had influenza vaccine around Sept 1st     Review of Systems   Constitutional: Negative for chills, diaphoresis, fatigue and fever.   HENT: Positive for ear pain (left, red, swollen). Negative for ear discharge, hearing loss, rhinorrhea, sinus pressure and sore throat.    Eyes: Negative for discharge and itching.   Respiratory: Negative for cough, shortness of breath and wheezing.    Gastrointestinal: Negative for constipation, diarrhea and nausea.   Skin: Negative for rash.   Neurological: Negative for dizziness, light-headedness and headaches.         Objective    /82   Pulse 72   Temp 97.8  F (36.6  C) (Tympanic)   Resp 16   Ht 1.702 m (5' 7\")   Wt 105.7 kg (233 lb)   LMP  (LMP Unknown)   BMI 36.49 kg/m    Body mass index is 36.49 kg/m .  Physical Exam  Constitutional:       Appearance: She is well-developed.   HENT:      Head:     Cardiovascular:      Rate and Rhythm: Normal rate and regular rhythm.   Pulmonary:      Effort: Pulmonary effort is normal.      Breath sounds: Normal breath sounds.   Skin:     General: Skin is warm.   Neurological:      Mental Status: She is alert.             Assessment & Plan "     Cellulitis of left ear  Educated on use of antibiotic.  Ibuprofen as needed for pain/discomfort.  Encouraged to loosen/remove fax of earrings.  May need to eventually remove piercings.  - ibuprofen (ADVIL/MOTRIN) 800 MG tablet; Take 1 tablet (800 mg) by mouth every 8 hours as needed for moderate pain, fever or pain Take with food  - cephALEXin (KEFLEX) 500 MG capsule; Take 1 capsule (500 mg) by mouth 4 times daily for 10 days        No follow-ups on file.    BROOKLYN Cruz [facility]

## 2020-11-13 DIAGNOSIS — R11.2 NON-INTRACTABLE VOMITING WITH NAUSEA, UNSPECIFIED VOMITING TYPE: ICD-10-CM

## 2020-11-13 LAB
ALBUMIN UR-MCNC: NEGATIVE MG/DL
ANION GAP SERPL CALCULATED.3IONS-SCNC: 5 MMOL/L (ref 3–14)
APPEARANCE UR: ABNORMAL
BILIRUB UR QL STRIP: NEGATIVE
BUN SERPL-MCNC: 14 MG/DL (ref 7–30)
CALCIUM SERPL-MCNC: 9.5 MG/DL (ref 8.5–10.1)
CHLORIDE SERPL-SCNC: 108 MMOL/L (ref 94–109)
CO2 SERPL-SCNC: 24 MMOL/L (ref 20–32)
COLOR UR AUTO: YELLOW
CREAT SERPL-MCNC: 0.65 MG/DL (ref 0.52–1.04)
GFR SERPL CREATININE-BSD FRML MDRD: >90 ML/MIN/{1.73_M2}
GLUCOSE SERPL-MCNC: 114 MG/DL (ref 70–99)
GLUCOSE UR STRIP-MCNC: NEGATIVE MG/DL
HGB UR QL STRIP: ABNORMAL
KETONES UR STRIP-MCNC: NEGATIVE MG/DL
LEUKOCYTE ESTERASE UR QL STRIP: ABNORMAL
LITHIUM SERPL-SCNC: 0.86 MMOL/L (ref 0.6–1.2)
NITRATE UR QL: NEGATIVE
NON-SQ EPI CELLS #/AREA URNS LPF: ABNORMAL /LPF
PH UR STRIP: 6.5 PH (ref 5–7)
POTASSIUM SERPL-SCNC: 4.1 MMOL/L (ref 3.4–5.3)
RBC #/AREA URNS AUTO: ABNORMAL /HPF
SODIUM SERPL-SCNC: 137 MMOL/L (ref 133–144)
SOURCE: ABNORMAL
SP GR UR STRIP: 1.01 (ref 1–1.03)
UROBILINOGEN UR STRIP-ACNC: 0.2 EU/DL (ref 0.2–1)
WBC #/AREA URNS AUTO: ABNORMAL /HPF

## 2020-11-13 PROCEDURE — 81001 URINALYSIS AUTO W/SCOPE: CPT | Performed by: NURSE PRACTITIONER

## 2020-11-13 PROCEDURE — 36415 COLL VENOUS BLD VENIPUNCTURE: CPT | Performed by: NURSE PRACTITIONER

## 2020-11-13 PROCEDURE — 80178 ASSAY OF LITHIUM: CPT | Performed by: NURSE PRACTITIONER

## 2020-11-13 PROCEDURE — 80048 BASIC METABOLIC PNL TOTAL CA: CPT | Performed by: NURSE PRACTITIONER

## 2020-11-13 NOTE — LETTER
November 17, 2020      Aayna Prasad  [address]  [place] [address]          Ayana,     Your labs are in normal range.       Resulted Orders   Basic metabolic panel  (Ca, Cl, CO2, Creat, Gluc, K, Na, BUN)   Result Value Ref Range    Sodium 137 133 - 144 mmol/L    Potassium 4.1 3.4 - 5.3 mmol/L    Chloride 108 94 - 109 mmol/L    Carbon Dioxide 24 20 - 32 mmol/L    Anion Gap 5 3 - 14 mmol/L    Glucose 114 (H) 70 - 99 mg/dL    Urea Nitrogen 14 7 - 30 mg/dL    Creatinine 0.65 0.52 - 1.04 mg/dL    GFR Estimate >90 >60 mL/min/[1.73_m2]      Comment:      Non  GFR Calc  Starting 12/18/2018, serum creatinine based estimated GFR (eGFR) will be   calculated using the Chronic Kidney Disease Epidemiology Collaboration   (CKD-EPI) equation.      GFR Estimate If Black >90 >60 mL/min/[1.73_m2]      Comment:       GFR Calc  Starting 12/18/2018, serum creatinine based estimated GFR (eGFR) will be   calculated using the Chronic Kidney Disease Epidemiology Collaboration   (CKD-EPI) equation.      Calcium 9.5 8.5 - 10.1 mg/dL   Lithium level   Result Value Ref Range    Lithium Level 0.86 0.60 - 1.20 mmol/L   UA reflex to Microscopic and Culture   Result Value Ref Range    Color Urine Yellow     Appearance Urine Slightly Cloudy     Glucose Urine Negative NEG^Negative mg/dL    Bilirubin Urine Negative NEG^Negative    Ketones Urine Negative NEG^Negative mg/dL    Specific Gravity Urine 1.015 1.003 - 1.035    Blood Urine Trace (A) NEG^Negative    pH Urine 6.5 5.0 - 7.0 pH    Protein Albumin Urine Negative NEG^Negative mg/dL    Urobilinogen Urine 0.2 0.2 - 1.0 EU/dL    Nitrite Urine Negative NEG^Negative    Leukocyte Esterase Urine Trace (A) NEG^Negative    Source Midstream Urine    Urine Microscopic   Result Value Ref Range    WBC Urine 0 - 5 OTO5^0 - 5 /HPF    RBC Urine 2-5 (A) OTO2^O - 2 /HPF    Squamous Epithelial /LPF Urine Moderate (A) FEW^Few /LPF       If you have any questions or  concerns, please call the clinic at the number listed above.       Sincerely,        Becki Avery APRN, CNP/ag

## 2020-11-18 ENCOUNTER — TELEPHONE (OUTPATIENT)
Dept: FAMILY MEDICINE | Facility: CLINIC | Age: 30
End: 2020-11-18

## 2020-11-18 DIAGNOSIS — R11.2 NAUSEA AND VOMITING, INTRACTABILITY OF VOMITING NOT SPECIFIED, UNSPECIFIED VOMITING TYPE: Primary | ICD-10-CM

## 2020-11-18 NOTE — TELEPHONE ENCOUNTER
I have placed a referral order for daily to see a gastroenterologist.  They will contact you within the next 24 to 48 hours to set up appointment date, time and location.    Please notify ordering prescribers regarding Alec JENKINS

## 2020-11-18 NOTE — TELEPHONE ENCOUNTER
"Ayana reports that patient had a zofran this morning at 8:24 AM. She is still having \"stomach issues\" and is wanting to take zofran earlier. Patient was also caught recently tring to \"sneak\" and extra vyvanse. She recently had an increase in her propranolol and vyvanse.   Advised to have patient try gingerale or peppermint tea to help settle her stomach.  Will route to provider to inform.  Stephany Nunn RN  "

## 2020-11-18 NOTE — TELEPHONE ENCOUNTER
Called the place Ayana is in  They were just notifying the PC she manipulated  a new staff and got an extra dose of Vyvanse, she did not have any side effects, she does this often if she can , they try to watch her closely   Advised to let the ordering provider know and were advised a referral for GI was sent     [facility]  RN/Faustino Pearson

## 2020-11-18 NOTE — TELEPHONE ENCOUNTER
Patient wondering if she has permission to take her meds before scheduled time. Please call staff Ayana at [phone] Ext. [phone].    Thank you,      Nancy De La Cruz, [place]

## 2020-11-19 ENCOUNTER — TELEPHONE (OUTPATIENT)
Dept: GASTROENTEROLOGY | Facility: CLINIC | Age: 30
End: 2020-11-19

## 2020-11-19 NOTE — TELEPHONE ENCOUNTER
[facility] Call Center    Phone Message    May a detailed message be left on voicemail: yes     Reason for Call: Symptoms or Concerns     If patient has red-flag symptoms, warm transfer to triage line    Current symptom or concern: Patient is scheduled for first available consult.  GI Flow Chart Patient indicates that she is having difficulty eating.        Has patient previously been seen for this? No- Referral in Epic from Becki Avery APRN CNP in  FAMILY PRACTICE    By : NA    Date: NA    Are there any new or worsening symptoms? No      Action Taken: Message routed to:  [facility] ([facility] gi    Travel Screening: Not Applicable

## 2020-11-20 RX ORDER — LEVETIRACETAM 250 MG/1
TABLET, FILM COATED ORAL
Qty: 355 ML | Refills: 0 | Status: SHIPPED | OUTPATIENT
Start: 2020-11-20 | End: 2020-12-11

## 2020-11-20 NOTE — TELEPHONE ENCOUNTER
REFERRAL INFORMATION:    Referring Provider:  BROOKLYN Edwards CNP     Referring Clinic:   [facility]     Reason for Visit/Diagnosis: Vomiting and Nausea     FUTURE VISIT INFORMATION:    Appointment Date: 11/23/2020    Appointment Time: 1 PM      NOTES STATUS DETAILS   OFFICE NOTE from Referring Provider Internal 11/4/2020 Office visit with BROOKLYN Edwards CNP    OFFICE NOTE from Other Specialist Internal 11/5/18 Office visit with Dr. Ceci Underwood ([facility])    HOSPITAL DISCHARGE SUMMARY/  ED VISITS Internal 7/11/2020 ([facility])  12/14/18 ([facility])    OPERATIVE REPORT N/A    MEDICATION LIST Internal         ENDOSCOPY  Internal EGD: 12/24/18, 4/8/13   COLONOSCOPY Received 12/1/11 ([facility])    ERCP N/A    EUS N/A    STOOL TESTING Internal 12/12/18   PERTINENT LABS Internal    PATHOLOGY REPORTS (RELATED) N/A    IMAGING (CT, MRI, EGD, MRCP, Small Bowel Follow Through/SBT, MR/CT Enterography) Internal

## 2020-11-23 ENCOUNTER — PRE VISIT (OUTPATIENT)
Dept: GASTROENTEROLOGY | Facility: CLINIC | Age: 30
End: 2020-11-23

## 2020-11-23 ENCOUNTER — VIRTUAL VISIT (OUTPATIENT)
Dept: GASTROENTEROLOGY | Facility: CLINIC | Age: 30
End: 2020-11-23
Attending: NURSE PRACTITIONER
Payer: COMMERCIAL

## 2020-11-23 VITALS — HEIGHT: 67 IN | BODY MASS INDEX: 36.49 KG/M2

## 2020-11-23 DIAGNOSIS — R11.2 NAUSEA AND VOMITING, INTRACTABILITY OF VOMITING NOT SPECIFIED, UNSPECIFIED VOMITING TYPE: ICD-10-CM

## 2020-11-23 DIAGNOSIS — R19.7 DIARRHEA, UNSPECIFIED TYPE: Primary | ICD-10-CM

## 2020-11-23 DIAGNOSIS — R11.0 NAUSEA: ICD-10-CM

## 2020-11-23 PROCEDURE — 99202 OFFICE O/P NEW SF 15 MIN: CPT | Mod: 95 | Performed by: PHYSICIAN ASSISTANT

## 2020-11-23 RX ORDER — ONDANSETRON 4 MG/1
4 TABLET, ORALLY DISINTEGRATING ORAL EVERY 8 HOURS PRN
Qty: 60 TABLET | Refills: 0 | Status: SHIPPED | OUTPATIENT
Start: 2020-11-23 | End: 2021-02-16

## 2020-11-23 ASSESSMENT — PAIN SCALES - GENERAL: PAINLEVEL: NO PAIN (0)

## 2020-11-23 NOTE — PATIENT INSTRUCTIONS
It was a pleasure taking care of you today.  I've included a brief summary of our discussion and care plan from today's visit below.  Please review this information with your primary care provider.  ______________________________________________________________________    My recommendations are summarized as follows:    --Meet with our GI pharmacist  --Stool studies  --Small frequent meals throughout the day, keep up with your liquids  --Use Zofran as needed (up to every 8 hours)     Return to GI Clinic in 3-4 weeks to review your progress.    ______________________________________________________________________    How do I schedule labs, imaging studies, or procedures that were ordered in clinic today?   Labs: To schedule lab appointment at the [facility], use my chart or call [phone]. If you have a [facility] lab closer to home where you are regularly seen you can give them a call.     Procedures: If a colonoscopy, upper endoscopy, breath test, esophageal manometry, or pH impedence was ordered today, our endoscopy team will call you to schedule this. If you have not heard from our endoscopy team within a week, please call [phone] to schedule.     Imaging Studies: If you were scheduled for a CT scan, X-ray, MRI, ultrasound, HIDA scan or other imaging study, please call [phone] to have this scheduled.     Referral: If a referral to another specialty was ordered, expect a phone call or follow instructions above. If you have not heard from anyone regarding your referral in a week, please call our clinic to check the status.     Who do I call with any questions after my visit?  Please be in touch if there are any further questions that arise following today's visit.  There are multiple ways to contact your gastroenterology care team.        During business hours, you may reach a Gastroenterology nurse at [phone]      To schedule or reschedule an appointment, please call [phone].        You can always send a secure message through Mevio.  Mevio messages are answered by your nurse or doctor typically within 24 hours.  Please allow extra time on weekends and holidays.        For urgent/emergent questions after business hours, you may reach the on-call GI Fellow by contacting the [facility]  at [phone].     How will I get the results of any tests ordered?    You will receive all of your results.  If you have signed up for MSIt, any tests ordered at your visit will be available to you after your physician reviews them.  Typically this takes 1-2 weeks.  If there are urgent results that require a change in your care plan, your physician or nurse will call you to discuss the next steps.      What is Mevio?  Mevio is a secure way for you to access all of your healthcare records from the [facility].  It is a web based computer program, so you can sign on to it from any location.  It also allows you to send secure messages to your care team.  I recommend signing up for Mevio access if you have not already done so and are comfortable with using a computer.      How to I schedule a follow-up visit?  If you did not schedule a follow-up visit today, please call [phone] to schedule a follow-up office visit.      Sincerely,    Katiuska Viramontes PA-C  Division of Gastroenterology, Hepatology & Nutrition  [facility]

## 2020-11-23 NOTE — PROGRESS NOTES
GI CLINIC VISIT    CC/REFERRING MD:  Becki Avery  REASON FOR CONSULTATION: nausea, vomiting    ASSESSMENT/PLAN:  Ayana Prasad is a 30 year old woman with a past medical history of cauda equina syndrome, neurogenic bladder, cannabis abuse, opiate abuse disorder, ovarian cyst, nephrolithiasis, bipolar 1, schizoaffective disorder, BPD, depression, ADHD, anxiety, SI, TBI presenting for abdominal pain.      1. Nausea, vomiting   The patient reports episode of nausea and vomiting that has lasted for about a month.  States she has an episode like this a couple times a year or every other year.  Has had upper endoscopy previously, most recently in 2018 with subjectively normal esophagus, stomach and duodenum and unremarkable biopsies.  Most recent episode seems to be associated with a change in stool consistency as well-we will investigate further with infectious stool studies for gastroenteritis and IBD given family history with fecal calprotectin. Would also recommend that the patient discusses possible medication sources symptoms with our GI pharmacist.  Other possibilities include cyclical vomiting syndrome, delayed gastric emptying (though she denies weight loss, vomiting food from the night before or early satiety), less likely outlet obstruction given previous normal upper endoscopy.  Would also recommend discussing with our neurology given MRI brain findings from 10/20/2020.  In the meantime, would recommend using as needed Zofran, trying to eat small frequent meals throughout the day, and keeping up on her liquids.  Will follow up in 3 to 4 weeks to discuss whether further imaging/testing is indicated.  --Meet with our GI pharmacist  --Stool studies  --Small frequent meals throughout the day, keep up with your liquids  --Use Zofran as needed (up to every 8 hours)     RTC 3 to 4 weeks    Thank you for this consultation.  It was a pleasure to participate in the care of this patient; please contact us with  any further questions.     I spent a total of 23 minutes face-to-face (video) with Ayana Prasad during today's office visit.  Over 50% of which was counseling/coordinating care regarding the above delineated issues. Please see note for details.    Note completed using voice recognition software. Some word and grammatical errors may occur.       Katiuska Viramontes PA-C  Division of Gastroenterology, Hepatology & Nutrition  [facility]        HPI  Ayana Prasad is a 30 year old woman with a past medical history of cauda equina syndrome, neurogenic bladder, cannabis abuse, opiate abuse disorder, ovarian cyst, nephrolithiasis, bipolar 1, schizoaffective disorder, BPD, depression, ADHD, anxiety, SI, TBI presenting for abdominal pain.  She is new to the [facility] and this is my first encounter with the patient.    She has previously been seen by [place] [facility], most recent visit visible with Dr. Saba 1/31/2019.  At this visit, she describes nausea, intermittent vomiting between 3-5 times a week.  Also reported 40 pound weight loss, diarrhea with 5-10 liquid bowel movements a day.  At this time had unremarkable labs with CBC, CMP, Giardia.  Did have positive C. difficile for which she was treated with vancomycin without improvement in her symptoms.  Had upper endoscopy December 2018 with normal esophagus, stomach and duodenum with normal biopsies.  She was started on fidaxomicin at that time.    More recently, patient has reported increased nausea and vomiting. Was seen by PCP with BMP and lithium level within normal limits.     Will last a long time and happen once or twice every other year.   For 1 month has been waking up sick to her stomach. This happened out of the blue. Has symptoms every day, severity can change throughout the day. Will wake up in the middle of the night nauseated, usually at 8:30 in the morning. Feels like she has been on a roller coaster ride- just  "ate. When she vomits, stomach acid will come up. No food in this. Throughout the day she will have mild stomach pain. Will be able to eat meals 50% of the time. No early satiety. Sometimes reflux- minor.     Describes mid abdominal pain. Dull pain. 4/10. Does not change with eating or having a bowel movement. Does not significantly change with bending or twisting, movement.     Bowel movements: \"smells like iron\" really strongly. This started around same time as other symptoms. 3-4 bowel movements (spread throughout the day). States this is like pudding. No melena or hematochezia. States baseline is soft, 1 time a day.     Takes zofran every day which helps her symptoms     ROS:    No fevers or chills  No weight loss  No blurry vision, double vision or change in vision  No sore throat  No lymphadenopathy  No headache, paraesthesias, or weakness in a limb  No shortness of breath or wheezing  No chest pain or pressure  No arthralgias or myalgias  No rashes or skin changes  No odynophagia or dysphagia  No BRBPR, hematochezia, melena  No dysuria, frequency or urgency  No hot/cold intolerance or polyria  No anxiety or depression    PROBLEM LIST  Patient Active Problem List    Diagnosis Date Noted     AVA (generalized anxiety disorder) 11/16/2020     Priority: Medium     Chronic bilateral low back pain without sciatica 01/17/2020     Priority: Medium     Obesity (BMI 35.0-39.9) with comorbidity (H) 12/18/2019     Priority: Medium     Urinary retention 06/29/2019     Priority: Medium     Spell of altered consciousness 06/27/2019     Priority: Medium     Bella (H) 06/04/2019     Priority: Medium     Suicidal ideation 04/09/2019     Priority: Medium     Nausea 02/25/2019     Priority: Medium     Cyst of left ovary 11/05/2018     Priority: Medium     Nephrolithiasis 08/29/2018     Priority: Medium     Class 2 obesity due to excess calories in adult 03/07/2018     Priority: Medium     Auditory hallucinations 02/26/2018     " Priority: Medium     Anxiety 01/05/2018     Priority: Medium     Schizoaffective disorder, bipolar type (H) 06/30/2017     Priority: Medium     PTSD (post-traumatic stress disorder) 06/30/2017     Priority: Medium     Cauda equina syndrome with neurogenic bladder (H) 01/20/2017     Priority: Medium     Moderate episode of recurrent major depressive disorder (H) 01/17/2017     Priority: Medium     Borderline personality disorder (H) 12/27/2016     Priority: Medium     History of heroin abuse (H) 12/27/2016     Priority: Medium     Optic neuritis 03/04/2016     Priority: Medium     From recent dx of optic neuritis w/ vision loss, and iritis. Received infusions x 4 of solumedrol at [facility]. MRI done of head as well which was normal. Spinal tap looked ok per patient.         Intractable back pain 09/20/2015     Priority: Medium     Depression 02/14/2015     Priority: Medium     Overdose 02/14/2015     Priority: Medium     Overview:   Intentional overdose October 2016 and May 2016       Cannabis dependence (H) 08/22/2013     Priority: Medium     Episodic mood disorder (H) 08/22/2013     Priority: Medium     Opioid use disorder, severe, dependence (H) 08/22/2013     Priority: Medium     Substance-induced psychotic disorder with hallucinations (H) 08/22/2013     Priority: Medium     GERD (gastroesophageal reflux disease) 07/08/2013     Priority: Medium     Tobacco abuse 07/08/2013     Priority: Medium     Marijuana abuse 05/31/2013     Priority: Medium     Daily.  Some use of MDMA and cocaine in past and recently had a 4 day break where she doesn't recall getting lost in woods.        Polysubstance abuse (H) 05/31/2013     Priority: Medium     Marijuana daily, Xanax periodically.  MDMA a couple times and cocaine. Narcotics and over the counter. Dextromethorphan with overdose 5/1/16 at [facility].  CD recommended.        Bipolar 1 disorder, manic, mild 01/13/2012     Priority: Medium     Close to meeting criteria for  bipolar 1? Reji Dey.  Psychology feels bipolar may be appropriate diagnosis although subsequent evaluation by psychiatry at [place] felt depression with borderline personality.  Will return for med discussion with preference for Lithium 300 two times daily with goal 12 hour trough 0.8-1.2.  March 26, 2012 - intitiated Li and seemed to help some but stopped due to shakiness.  Lamotrigine trial as having more depression issues 25/d, up to two times daily then gradually increase to 100-200 mg daily.  Consider olazapine for thought disturbance. Has not wanted medications but used friends Xanax.  Prozac plus olazapine good next option once GI issues worked through. Patient very resistent to medications.  Continue with Reji.   May 31, 2013 - patient likely in a hypomanic/manic phase right now but no signs or symptoms of dangerous behaviors or thought processes.  Trial of olanzapine and fluoxetine. Didn't like olanzapine. Stopped as inpt for non-suicidal overdose at [facility]. Pyschiatry, psychology and continue with Prozac.  Now agreeing to take prozac and seroquel. Stopped Prozac on her own because didn't notice difference.  Trazodone didn't help. Stopped all. Possible haven?  Restart quetiapine for minor hallucinations.        ADHD (attention deficit hyperactivity disorder) 09/09/2011     Priority: Medium     Adderall ineffective.  Better on Concerta.  Still with anger issues predating medications. Declines counseling. Suggested vocational assessment.  Trial of guanfacine adjunct for anger but didn't help. Would avoid controlled substances given CD issues and impulsivity.       Abdominal pain, right upper quadrant 11/16/2010     Priority: Medium       PERTINENT PAST MEDICAL HISTORY:  Past Medical History:   Diagnosis Date     ADHD (attention deficit hyperactivity disorder)      Bipolar 1 disorder      Borderline personality disorder      Cauda equina syndrome      Chronic low back pain      Depression      Depressive  disorder      GERD (gastroesophageal reflux disease)      h/o TBI (traumatic brain injury)      Marginal corneal ulcer, left 7/17/2015     Nephrolithiasis      Polysubstance abuse - methamphetamine, hallucinagen, heroin, marijuana     currently in remission     PONV (postoperative nausea and vomiting)      PTSD (post-traumatic stress disorder)        PREVIOUS SURGERIES:  Cholecystectomy in 2016   Past Surgical History:   Procedure Laterality Date     BACK SURGERY - For Cauda Equina Syndrome  12/24/2016     COLONOSCOPY       COMBINED CYSTOSCOPY, INSERT STENT URETER(S) Left 8/30/2018    Procedure: COMBINED CYSTOSCOPY, INSERT STENT URETER(S);  Cystoscopy With Left Stent Placement;  Surgeon: Kiran Ulrich MD;  Location: [facility]     COMBINED CYSTOSCOPY, RETROGRADES, EXCHANGE STENT URETER(S) Left 10/14/2018    Procedure: COMBINED CYSTOSCOPY, RETROGRADES, EXCHANGE STENT URETER(S);  Cystoscopy and removal of left-sided stent.;  Surgeon: Stiven Olivo MD;  Location:  OR     COMBINED CYSTOSCOPY, RETROGRADES, URETEROSCOPY, INSERT STENT  1/6/2014    Procedure: COMBINED CYSTOSCOPY, RETROGRADES, URETEROSCOPY, INSERT STENT;  Cystyoscopy place left ureteral stent;  Surgeon: Jaun Kimble MD;  Location: [facility] OR     COMBINED CYSTOSCOPY, RETROGRADES, URETEROSCOPY, INSERT STENT Left 10/23/2018    Procedure: Video cystoscopy, left ureteroscopy with stone extraction;  Surgeon: Bull Mast MD;  Location:  OR     CYSTOSCOPY, URETEROSCOPY, COMBINED Right 9/23/2015    Procedure: COMBINED CYSTOSCOPY, URETEROSCOPY;  Surgeon: ROME Jett MD;  Location: [facility] OR     ENT SURGERY       ESOPHAGOSCOPY, GASTROSCOPY, DUODENOSCOPY (EGD), COMBINED  4/8/2013    Procedure: COMBINED ESOPHAGOSCOPY, GASTROSCOPY, DUODENOSCOPY (EGD), BIOPSY SINGLE OR MULTIPLE;  Gastroscopy;  Surgeon: Peter Pickard MD;  Location: [facility] GI     ESOPHAGOSCOPY, GASTROSCOPY, DUODENOSCOPY (EGD), COMBINED Left 10/28/2014    Procedure:  COMBINED ESOPHAGOSCOPY, GASTROSCOPY, DUODENOSCOPY (EGD), BIOPSY SINGLE OR MULTIPLE;  Surgeon: Narcisa Ramirez MD;  Location:  OR     ESOPHAGOSCOPY, GASTROSCOPY, DUODENOSCOPY (EGD), COMBINED N/A 12/24/2018    Procedure: COMBINED ESOPHAGOSCOPY, GASTROSCOPY, DUODENOSCOPY (EGD), BIOPSY SINGLE OR MULTIPLE;  Surgeon: Sonu Verduzco MD;  Location: [facility]     LAPAROSCOPIC CHOLECYSTECTOMY  11/20/2014    [facility], Dr. Ramirez     LASER HOLMIUM LITHOTRIPSY URETER(S), INSERT STENT, COMBINED  4/2/2014    Procedure: COMBINED CYSTOSCOPY, URETEROSCOPY, LASER HOLMIUM LITHOTRIPSY URETER(S), INSERT STENT;  Cystoscopy,Left Ureteral Stent Removal,Left Ureteroscopy with Laser Lithotripsy,Left Ureter Stent Placement;  Surgeon: ROME Jett MD;  Location: [facility] OR     Transurethral stone resection  03/11/2011       PREVIOUS ENDOSCOPY:  EGD  2014:  Impression:               - No gross lesions in duodenum. Biopsied.                             - Erythematous mucosa in the stomach. Biopsied.                             - Non-bleeding erosive gastropathy. Biopsied.                             - Normal esophagus. Biopsied.   A. Stomach, antrum, biopsy:   - Gastric antral-type mucosa with no pathologic changes.   - No Helicobacter pylori identified.     B. Stomach, biopsy:   - Gastric body-type mucosa with focal minimal chronic inflammation,   nonspecific   - No evidence of active inflammation, intestinal metaplasia, dysplasia or   malignancy   - No Helicobacter pylori identified.     C. Esophagus, random, biopsy:   - Stratified squamous mucosa with no pathologic changes.   - No glandular epithelium seen.     ALLERGIES:     Allergies   Allergen Reactions     Haldol [Haloperidol] Other (See Comments)     Makes patient very angry and anxious     Adhesive Tape Hives     Percocet [Oxycodone-Acetaminophen] Nausea and Vomiting     Prednisone Other (See Comments) and Hives     Suicidal ideation     Risperidone Other (See Comments)      Tramadol Hcl Nausea and Vomiting     Droperidol Anxiety     Seroquel [Quetiapine] Palpitations     Spent 2 weeks in the hospital due to having seroquel, caused palpitations and QT prolongation       PERTINENT MEDICATIONS:    Current Outpatient Medications:      acetaminophen (TYLENOL) 500 MG tablet, Take 2 tablets (1,000 mg) by mouth every 8 hours as needed for mild pain (for pain), Disp: 30 tablet, Rfl: 0     ALMACONE -400-40 MG/5ML SUSP suspension, TAKE 30MLS BY MOUTH EVERY 4 HOURS AS NEEDED FOR INDIGESTION, Disp: 355 mL, Rfl: 0     fluPHENAZine decanoate (PROLIXIN) 25 MG/ML injection, INJECT 1ML (25MG) INTRAMUSCULARLY EVERY 14 DAYS, Disp: 6 mL, Rfl: 0     gabapentin (NEURONTIN) 300 MG capsule, Take 900 mg in AM and 1200 mg mid day and 900 mg in PM, Disp: 300 capsule, Rfl: 11     ibuprofen (ADVIL/MOTRIN) 800 MG tablet, Take 1 tablet (800 mg) by mouth every 8 hours as needed for moderate pain, fever or pain Take with food, Disp: 60 tablet, Rfl: 1     lisdexamfetamine (VYVANSE) 30 MG capsule, Take 30 mg by mouth every morning, Disp: , Rfl:      lithium ER (LITHOBID) 300 MG CR tablet, Take one tab (1) each morning and three (3) tabs at bedtime, Disp: 120 tablet, Rfl: 0     norelgestromin-ethinyl estradiol (XULANE) 150-35 MCG/24HR patch, Place 1 patch onto the skin once a week May use continuously, Disp: 12 patch, Rfl: 4     ondansetron (ZOFRAN-ODT) 4 MG ODT tab, Take 1 tablet (4 mg) by mouth every 8 hours as needed for nausea, Disp: 30 tablet, Rfl: 0     propranolol (INDERAL) 20 MG tablet, Take 20 mg by mouth 2 times daily, Disp: , Rfl:      QUEtiapine (SEROQUEL) 100 MG tablet, Take 1 tablet (100 mg) by mouth At Bedtime, Disp: 30 tablet, Rfl: 0  Ibuprofen- twice a day every day, started about a week ago. Takes this for her ear piercing infection     SOCIAL HISTORY:  No alcohol, smokes cigarettes (1/2 pack a day), No THC, no opioid medications   Social History     Socioeconomic History     Marital status:       Spouse name: Not on file     Number of children: 0     Years of education: Not on file     Highest education level: Not on file   Occupational History     Employer: [employer]   Social Needs     Financial resource strain: Not on file     Food insecurity     Worry: Not on file     Inability: Not on file     Transportation needs     Medical: Not on file     Non-medical: Not on file   Tobacco Use     Smoking status: Current Every Day Smoker     Packs/day: 0.50     Years: 5.00     Pack years: 2.50     Types: Dip, chew, snus or snuff, Other     Start date: 8/1/2011     Smokeless tobacco: Current User     Types: Chew     Last attempt to quit: 12/17/2019   Substance and Sexual Activity     Alcohol use: No     Alcohol/week: 0.0 standard drinks     Drug use: No     Types: Opiates     Comment: oxy, heroin (smoke)     Sexual activity: Yes     Partners: Female     Birth control/protection: None   Lifestyle     Physical activity     Days per week: Not on file     Minutes per session: Not on file     Stress: Not on file   Relationships     Social connections     Talks on phone: Not on file     Gets together: Not on file     Attends Anglican service: Not on file     Active member of club or organization: Not on file     Attends meetings of clubs or organizations: Not on file     Relationship status: Not on file     Intimate partner violence     Fear of current or ex partner: Not on file     Emotionally abused: Not on file     Physically abused: Not on file     Forced sexual activity: Not on file   Other Topics Concern     Parent/sibling w/ CABG, MI or angioplasty before 65F 55M? No   Social History Narrative    Originally from [place] has an abuse history completed school on time currently has a bachelor's degree from college.  Has a wife and 3 children lives with them in a home.  No  history no access to guns or weapons enjoys fishing.       FAMILY HISTORY:  FH of CRC: half brother with esophageal cancer  (48-49)   FH of IBD: Crohn's disease in father, cousin   No autoimmune or other cancers   Family History   Problem Relation Age of Onset     Gastrointestinal Disease Father         Crohn's Disease     Cancer Father         Liver Cancer     Other Cancer Father         Liver     Cancer Maternal Grandmother         lympoma     Diabetes Maternal Grandmother      Mental Illness Maternal Grandmother      Other Cancer Maternal Grandmother         Non Hodgkins Lymphoma     Diabetes Maternal Grandfather      Hypertension Maternal Grandfather      Prostate Cancer Maternal Grandfather      Hyperlipidemia Maternal Grandfather      Heart Disease Paternal Grandfather         heart disease     Hypertension Brother      Other Cancer Brother         Esophagecial     Diabetes Brother      Hyperlipidemia Mother      Mental Illness Mother      Anxiety Disorder Mother      Anesthesia Reaction Mother         Vomiting/Nausea     Hypertension Brother      Other Cancer Brother      Other Cancer Paternal Half-Brother         Esophageal       Past/family/social history reviewed and no changes    PHYSICAL EXAMINATION:  General appearance: Healthy appearing adult, in no acute distress  Eyes: Sclera anicteric, no erythema   Ears, nose, mouth and throat: No obvious external lesions of ears and nose, Hearing intact  Neck: Symmetric, No obvious external lesions  Respiratory: Normal respiration, no use of accessory muscles   Skin: No rashes or jaundice   Psychiatric: Oriented to person, place and time, Appropriate mood and affect.     PERTINENT STUDIES:    Orders Only on 11/13/2020   Component Date Value Ref Range Status     Sodium 11/13/2020 137  133 - 144 mmol/L Final     Potassium 11/13/2020 4.1  3.4 - 5.3 mmol/L Final     Chloride 11/13/2020 108  94 - 109 mmol/L Final     Carbon Dioxide 11/13/2020 24  20 - 32 mmol/L Final     Anion Gap 11/13/2020 5  3 - 14 mmol/L Final     Glucose 11/13/2020 114* 70 - 99 mg/dL Final     Urea Nitrogen 11/13/2020  14  7 - 30 mg/dL Final     Creatinine 11/13/2020 0.65  0.52 - 1.04 mg/dL Final     GFR Estimate 11/13/2020 >90  >60 mL/min/[1.73_m2] Final     GFR Estimate If Black 11/13/2020 >90  >60 mL/min/[1.73_m2] Final     Calcium 11/13/2020 9.5  8.5 - 10.1 mg/dL Final     Lithium Level 11/13/2020 0.86  0.60 - 1.20 mmol/L Final     Color Urine 11/13/2020 Yellow   Final     Appearance Urine 11/13/2020 Slightly Cloudy   Final     Glucose Urine 11/13/2020 Negative  NEG^Negative mg/dL Final     Bilirubin Urine 11/13/2020 Negative  NEG^Negative Final     Ketones Urine 11/13/2020 Negative  NEG^Negative mg/dL Final     Specific Gravity Urine 11/13/2020 1.015  1.003 - 1.035 Final     Blood Urine 11/13/2020 Trace* NEG^Negative Final     pH Urine 11/13/2020 6.5  5.0 - 7.0 pH Final     Protein Albumin Urine 11/13/2020 Negative  NEG^Negative mg/dL Final     Urobilinogen Urine 11/13/2020 0.2  0.2 - 1.0 EU/dL Final     Nitrite Urine 11/13/2020 Negative  NEG^Negative Final     Leukocyte Esterase Urine 11/13/2020 Trace* NEG^Negative Final     Source 11/13/2020 Midstream Urine   Final     WBC Urine 11/13/2020 0 - 5  OTO5^0 - 5 /HPF Final     RBC Urine 11/13/2020 2-5* OTO2^O - 2 /HPF Final     Squamous Epithelial /LPF Urine 11/13/2020 Moderate* FEW^Few /LPF Final     MRI brain: 10/2020  IMPRESSION:  1. No significant change in several nonspecific punctate/patchy  cerebral white matter signal abnormalities, with unchanged  differential diagnosis (as was delineated on the previous MRI brain  report). In a patient of this age, primary considerations would  include mild early chronic small vessel ischemic changes,  demyelinating disease, vascular headaches, or sequela of a remote  nonspecific previous insult. No new, enhancing, or diffusion  restricting lesions to suggest active disease. Clinical correlation is  recommended.  2. Otherwise unremarkable brain MRI.

## 2020-11-23 NOTE — NURSING NOTE
"Chief Complaint   Patient presents with     Consult     New appt for nausea/vomiting.       Vitals:    11/23/20 1232   Height: 1.702 m (5' 7\")       Body mass index is 36.49 kg/m .                            MARGARET LÓPEZ, EMT    "

## 2020-11-23 NOTE — LETTER
11/23/2020       RE: Ayana Prasad  [address] [facility] [id number]      Dear Colleague,    Thank you for referring your patient, Ayana Prasad, to the [facility] [place]. Please see a copy of my visit note below.    GI CLINIC VISIT    CC/REFERRING MD:  Becki Avery  REASON FOR CONSULTATION: nausea, vomiting    ASSESSMENT/PLAN:  Ayana Prasad is a 30 year old woman with a past medical history of cauda equina syndrome, neurogenic bladder, cannabis abuse, opiate abuse disorder, ovarian cyst, nephrolithiasis, bipolar 1, schizoaffective disorder, BPD, depression, ADHD, anxiety, SI, TBI presenting for abdominal pain.      1. Nausea, vomiting   The patient reports episode of nausea and vomiting that has lasted for about a month.  States she has an episode like this a couple times a year or every other year.  Has had upper endoscopy previously, most recently in 2018 with subjectively normal esophagus, stomach and duodenum and unremarkable biopsies.  Most recent episode seems to be associated with a change in stool consistency as well-we will investigate further with infectious stool studies for gastroenteritis and IBD given family history with fecal calprotectin. Would also recommend that the patient discusses possible medication sources symptoms with our GI pharmacist.  Other possibilities include cyclical vomiting syndrome, delayed gastric emptying (though she denies weight loss, vomiting food from the night before or early satiety), less likely outlet obstruction given previous normal upper endoscopy.  Would also recommend discussing with our neurology given MRI brain findings from 10/20/2020.  In the meantime, would recommend using as needed Zofran, trying to eat small frequent meals throughout the day, and keeping up on her liquids.  Will follow up in 3 to 4 weeks to discuss whether further imaging/testing is indicated.  --Meet with our GI pharmacist  --Stool  studies  --Small frequent meals throughout the day, keep up with your liquids  --Use Zofran as needed (up to every 8 hours)     RTC 3 to 4 weeks    Thank you for this consultation.  It was a pleasure to participate in the care of this patient; please contact us with any further questions.     I spent a total of 23 minutes face-to-face (video) with Ayana Prasad during today's office visit.  Over 50% of which was counseling/coordinating care regarding the above delineated issues. Please see note for details.    Note completed using voice recognition software. Some word and grammatical errors may occur.     Katiuska Viramontes PA-C  Division of Gastroenterology, Hepatology & Nutrition  [facility]      HPI  Ayana Prasad is a 30 year old woman with a past medical history of cauda equina syndrome, neurogenic bladder, cannabis abuse, opiate abuse disorder, ovarian cyst, nephrolithiasis, bipolar 1, schizoaffective disorder, BPD, depression, ADHD, anxiety, SI, TBI presenting for abdominal pain.  She is new to the [facility] GI clinic and this is my first encounter with the patient.    She has previously been seen by [place] [facility], most recent visit visible with Dr. Saba 1/31/2019.  At this visit, she describes nausea, intermittent vomiting between 3-5 times a week.  Also reported 40 pound weight loss, diarrhea with 5-10 liquid bowel movements a day.  At this time had unremarkable labs with CBC, CMP, Giardia.  Did have positive C. difficile for which she was treated with vancomycin without improvement in her symptoms.  Had upper endoscopy December 2018 with normal esophagus, stomach and duodenum with normal biopsies.  She was started on fidaxomicin at that time.    More recently, patient has reported increased nausea and vomiting. Was seen by PCP with BMP and lithium level within normal limits.     Will last a long time and happen once or twice every other year.   For 1 month has been  "waking up sick to her stomach. This happened out of the blue. Has symptoms every day, severity can change throughout the day. Will wake up in the middle of the night nauseated, usually at 8:30 in the morning. Feels like she has been on a roller coaster ride- just ate. When she vomits, stomach acid will come up. No food in this. Throughout the day she will have mild stomach pain. Will be able to eat meals 50% of the time. No early satiety. Sometimes reflux- minor.     Describes mid abdominal pain. Dull pain. 4/10. Does not change with eating or having a bowel movement. Does not significantly change with bending or twisting, movement.     Bowel movements: \"smells like iron\" really strongly. This started around same time as other symptoms. 3-4 bowel movements (spread throughout the day). States this is like pudding. No melena or hematochezia. States baseline is soft, 1 time a day.     Takes zofran every day which helps her symptoms     ROS:    No fevers or chills  No weight loss  No blurry vision, double vision or change in vision  No sore throat  No lymphadenopathy  No headache, paraesthesias, or weakness in a limb  No shortness of breath or wheezing  No chest pain or pressure  No arthralgias or myalgias  No rashes or skin changes  No odynophagia or dysphagia  No BRBPR, hematochezia, melena  No dysuria, frequency or urgency  No hot/cold intolerance or polyria  No anxiety or depression    PROBLEM LIST  Patient Active Problem List    Diagnosis Date Noted     AVA (generalized anxiety disorder) 11/16/2020     Priority: Medium     Chronic bilateral low back pain without sciatica 01/17/2020     Priority: Medium     Obesity (BMI 35.0-39.9) with comorbidity (H) 12/18/2019     Priority: Medium     Urinary retention 06/29/2019     Priority: Medium     Spell of altered consciousness 06/27/2019     Priority: Medium     Bella (H) 06/04/2019     Priority: Medium     Suicidal ideation 04/09/2019     Priority: Medium     Nausea " 02/25/2019     Priority: Medium     Cyst of left ovary 11/05/2018     Priority: Medium     Nephrolithiasis 08/29/2018     Priority: Medium     Class 2 obesity due to excess calories in adult 03/07/2018     Priority: Medium     Auditory hallucinations 02/26/2018     Priority: Medium     Anxiety 01/05/2018     Priority: Medium     Schizoaffective disorder, bipolar type (H) 06/30/2017     Priority: Medium     PTSD (post-traumatic stress disorder) 06/30/2017     Priority: Medium     Cauda equina syndrome with neurogenic bladder (H) 01/20/2017     Priority: Medium     Moderate episode of recurrent major depressive disorder (H) 01/17/2017     Priority: Medium     Borderline personality disorder (H) 12/27/2016     Priority: Medium     History of heroin abuse (H) 12/27/2016     Priority: Medium     Optic neuritis 03/04/2016     Priority: Medium     From recent dx of optic neuritis w/ vision loss, and iritis. Received infusions x 4 of solumedrol at [facility]. MRI done of head as well which was normal. Spinal tap looked ok per patient.         Intractable back pain 09/20/2015     Priority: Medium     Depression 02/14/2015     Priority: Medium     Overdose 02/14/2015     Priority: Medium     Overview:   Intentional overdose October 2016 and May 2016       Cannabis dependence (H) 08/22/2013     Priority: Medium     Episodic mood disorder (H) 08/22/2013     Priority: Medium     Opioid use disorder, severe, dependence (H) 08/22/2013     Priority: Medium     Substance-induced psychotic disorder with hallucinations (H) 08/22/2013     Priority: Medium     GERD (gastroesophageal reflux disease) 07/08/2013     Priority: Medium     Tobacco abuse 07/08/2013     Priority: Medium     Marijuana abuse 05/31/2013     Priority: Medium     Daily.  Some use of MDMA and cocaine in past and recently had a 4 day break where she doesn't recall getting lost in woods.        Polysubstance abuse (H) 05/31/2013     Priority: Medium     Marijuana  daily, Xanax periodically.  MDMA a couple times and cocaine. Narcotics and over the counter. Dextromethorphan with overdose 5/1/16 at [facility].  CD recommended.        Bipolar 1 disorder, manic, mild 01/13/2012     Priority: Medium     Close to meeting criteria for bipolar 1? Reji Dey.  Psychology feels bipolar may be appropriate diagnosis although subsequent evaluation by psychiatry at [place] felt depression with borderline personality.  Will return for med discussion with preference for Lithium 300 two times daily with goal 12 hour trough 0.8-1.2.  March 26, 2012 - intitiated Li and seemed to help some but stopped due to shakiness.  Lamotrigine trial as having more depression issues 25/d, up to two times daily then gradually increase to 100-200 mg daily.  Consider olazapine for thought disturbance. Has not wanted medications but used friends Xanax.  Prozac plus olazapine good next option once GI issues worked through. Patient very resistent to medications.  Continue with Reji.   May 31, 2013 - patient likely in a hypomanic/manic phase right now but no signs or symptoms of dangerous behaviors or thought processes.  Trial of olanzapine and fluoxetine. Didn't like olanzapine. Stopped as inpt for non-suicidal overdose at [facility]. Pyschiatry, psychology and continue with Prozac.  Now agreeing to take prozac and seroquel. Stopped Prozac on her own because didn't notice difference.  Trazodone didn't help. Stopped all. Possible haven?  Restart quetiapine for minor hallucinations.        ADHD (attention deficit hyperactivity disorder) 09/09/2011     Priority: Medium     Adderall ineffective.  Better on Concerta.  Still with anger issues predating medications. Declines counseling. Suggested vocational assessment.  Trial of guanfacine adjunct for anger but didn't help. Would avoid controlled substances given CD issues and impulsivity.       Abdominal pain, right upper quadrant 11/16/2010     Priority: Medium       PERTINENT  PAST MEDICAL HISTORY:  Past Medical History:   Diagnosis Date     ADHD (attention deficit hyperactivity disorder)      Bipolar 1 disorder      Borderline personality disorder      Cauda equina syndrome      Chronic low back pain      Depression      Depressive disorder      GERD (gastroesophageal reflux disease)      h/o TBI (traumatic brain injury)      Marginal corneal ulcer, left 7/17/2015     Nephrolithiasis      Polysubstance abuse - methamphetamine, hallucinagen, heroin, marijuana     currently in remission     PONV (postoperative nausea and vomiting)      PTSD (post-traumatic stress disorder)        PREVIOUS SURGERIES:  Cholecystectomy in 2016   Past Surgical History:   Procedure Laterality Date     BACK SURGERY - For Cauda Equina Syndrome  12/24/2016     COLONOSCOPY       COMBINED CYSTOSCOPY, INSERT STENT URETER(S) Left 8/30/2018    Procedure: COMBINED CYSTOSCOPY, INSERT STENT URETER(S);  Cystoscopy With Left Stent Placement;  Surgeon: Kiran Ulrich MD;  Location: [facility] OR     COMBINED CYSTOSCOPY, RETROGRADES, EXCHANGE STENT URETER(S) Left 10/14/2018    Procedure: COMBINED CYSTOSCOPY, RETROGRADES, EXCHANGE STENT URETER(S);  Cystoscopy and removal of left-sided stent.;  Surgeon: Stiven Olivo MD;  Location:  OR     COMBINED CYSTOSCOPY, RETROGRADES, URETEROSCOPY, INSERT STENT  1/6/2014    Procedure: COMBINED CYSTOSCOPY, RETROGRADES, URETEROSCOPY, INSERT STENT;  Cystyoscopy place left ureteral stent;  Surgeon: Jaun Kimble MD;  Location: [facility] OR     COMBINED CYSTOSCOPY, RETROGRADES, URETEROSCOPY, INSERT STENT Left 10/23/2018    Procedure: Video cystoscopy, left ureteroscopy with stone extraction;  Surgeon: Bull Mast MD;  Location:  OR     CYSTOSCOPY, URETEROSCOPY, COMBINED Right 9/23/2015    Procedure: COMBINED CYSTOSCOPY, URETEROSCOPY;  Surgeon: ROME Jett MD;  Location: [facility] OR     ENT SURGERY       ESOPHAGOSCOPY, GASTROSCOPY, DUODENOSCOPY (EGD), COMBINED  4/8/2013     Procedure: COMBINED ESOPHAGOSCOPY, GASTROSCOPY, DUODENOSCOPY (EGD), BIOPSY SINGLE OR MULTIPLE;  Gastroscopy;  Surgeon: Peter Pickard MD;  Location: [facility] GI     ESOPHAGOSCOPY, GASTROSCOPY, DUODENOSCOPY (EGD), COMBINED Left 10/28/2014    Procedure: COMBINED ESOPHAGOSCOPY, GASTROSCOPY, DUODENOSCOPY (EGD), BIOPSY SINGLE OR MULTIPLE;  Surgeon: Narcsia Ramirez MD;  Location:  OR     ESOPHAGOSCOPY, GASTROSCOPY, DUODENOSCOPY (EGD), COMBINED N/A 12/24/2018    Procedure: COMBINED ESOPHAGOSCOPY, GASTROSCOPY, DUODENOSCOPY (EGD), BIOPSY SINGLE OR MULTIPLE;  Surgeon: Sonu Verduzco MD;  Location: [facility] GI     LAPAROSCOPIC CHOLECYSTECTOMY  11/20/2014    [facility], Dr. Ramirez     LASER HOLMIUM LITHOTRIPSY URETER(S), INSERT STENT, COMBINED  4/2/2014    Procedure: COMBINED CYSTOSCOPY, URETEROSCOPY, LASER HOLMIUM LITHOTRIPSY URETER(S), INSERT STENT;  Cystoscopy,Left Ureteral Stent Removal,Left Ureteroscopy with Laser Lithotripsy,Left Ureter Stent Placement;  Surgeon: ROME Jett MD;  Location: [facility] OR     Transurethral stone resection  03/11/2011       PREVIOUS ENDOSCOPY:  EGD  2014:  Impression:               - No gross lesions in duodenum. Biopsied.                             - Erythematous mucosa in the stomach. Biopsied.                             - Non-bleeding erosive gastropathy. Biopsied.                             - Normal esophagus. Biopsied.   A. Stomach, antrum, biopsy:   - Gastric antral-type mucosa with no pathologic changes.   - No Helicobacter pylori identified.     B. Stomach, biopsy:   - Gastric body-type mucosa with focal minimal chronic inflammation,   nonspecific   - No evidence of active inflammation, intestinal metaplasia, dysplasia or   malignancy   - No Helicobacter pylori identified.     C. Esophagus, random, biopsy:   - Stratified squamous mucosa with no pathologic changes.   - No glandular epithelium seen.     ALLERGIES:     Allergies   Allergen Reactions     Haldol [Haloperidol] Other  (See Comments)     Makes patient very angry and anxious     Adhesive Tape Hives     Percocet [Oxycodone-Acetaminophen] Nausea and Vomiting     Prednisone Other (See Comments) and Hives     Suicidal ideation     Risperidone Other (See Comments)     Tramadol Hcl Nausea and Vomiting     Droperidol Anxiety     Seroquel [Quetiapine] Palpitations     Spent 2 weeks in the hospital due to having seroquel, caused palpitations and QT prolongation       PERTINENT MEDICATIONS:    Current Outpatient Medications:      acetaminophen (TYLENOL) 500 MG tablet, Take 2 tablets (1,000 mg) by mouth every 8 hours as needed for mild pain (for pain), Disp: 30 tablet, Rfl: 0     ALMACONE -400-40 MG/5ML SUSP suspension, TAKE 30MLS BY MOUTH EVERY 4 HOURS AS NEEDED FOR INDIGESTION, Disp: 355 mL, Rfl: 0     fluPHENAZine decanoate (PROLIXIN) 25 MG/ML injection, INJECT 1ML (25MG) INTRAMUSCULARLY EVERY 14 DAYS, Disp: 6 mL, Rfl: 0     gabapentin (NEURONTIN) 300 MG capsule, Take 900 mg in AM and 1200 mg mid day and 900 mg in PM, Disp: 300 capsule, Rfl: 11     ibuprofen (ADVIL/MOTRIN) 800 MG tablet, Take 1 tablet (800 mg) by mouth every 8 hours as needed for moderate pain, fever or pain Take with food, Disp: 60 tablet, Rfl: 1     lisdexamfetamine (VYVANSE) 30 MG capsule, Take 30 mg by mouth every morning, Disp: , Rfl:      lithium ER (LITHOBID) 300 MG CR tablet, Take one tab (1) each morning and three (3) tabs at bedtime, Disp: 120 tablet, Rfl: 0     norelgestromin-ethinyl estradiol (XULANE) 150-35 MCG/24HR patch, Place 1 patch onto the skin once a week May use continuously, Disp: 12 patch, Rfl: 4     ondansetron (ZOFRAN-ODT) 4 MG ODT tab, Take 1 tablet (4 mg) by mouth every 8 hours as needed for nausea, Disp: 30 tablet, Rfl: 0     propranolol (INDERAL) 20 MG tablet, Take 20 mg by mouth 2 times daily, Disp: , Rfl:      QUEtiapine (SEROQUEL) 100 MG tablet, Take 1 tablet (100 mg) by mouth At Bedtime, Disp: 30 tablet, Rfl: 0  Ibuprofen- twice a day  every day, started about a week ago. Takes this for her ear piercing infection     SOCIAL HISTORY:  No alcohol, smokes cigarettes (1/2 pack a day), No THC, no opioid medications   Social History     Socioeconomic History     Marital status:      Spouse name: Not on file     Number of children: 0     Years of education: Not on file     Highest education level: Not on file   Occupational History     Employer: [employer]   Social Needs     Financial resource strain: Not on file     Food insecurity     Worry: Not on file     Inability: Not on file     Transportation needs     Medical: Not on file     Non-medical: Not on file   Tobacco Use     Smoking status: Current Every Day Smoker     Packs/day: 0.50     Years: 5.00     Pack years: 2.50     Types: Dip, chew, snus or snuff, Other     Start date: 8/1/2011     Smokeless tobacco: Current User     Types: Chew     Last attempt to quit: 12/17/2019   Substance and Sexual Activity     Alcohol use: No     Alcohol/week: 0.0 standard drinks     Drug use: No     Types: Opiates     Comment: oxy, heroin (smoke)     Sexual activity: Yes     Partners: Female     Birth control/protection: None   Lifestyle     Physical activity     Days per week: Not on file     Minutes per session: Not on file     Stress: Not on file   Relationships     Social connections     Talks on phone: Not on file     Gets together: Not on file     Attends Episcopalian service: Not on file     Active member of club or organization: Not on file     Attends meetings of clubs or organizations: Not on file     Relationship status: Not on file     Intimate partner violence     Fear of current or ex partner: Not on file     Emotionally abused: Not on file     Physically abused: Not on file     Forced sexual activity: Not on file   Other Topics Concern     Parent/sibling w/ CABG, MI or angioplasty before 65F 55M? No   Social History Narrative    Originally from [place] has an abuse history completed school on  time currently has a bachelor's degree from college.  Has a wife and 3 children lives with them in a home.  No  history no access to guns or weapons enjoys fishing.       FAMILY HISTORY:  FH of CRC: half brother with esophageal cancer (48-49)   FH of IBD: Crohn's disease in father, cousin   No autoimmune or other cancers   Family History   Problem Relation Age of Onset     Gastrointestinal Disease Father         Crohn's Disease     Cancer Father         Liver Cancer     Other Cancer Father         Liver     Cancer Maternal Grandmother         lympoma     Diabetes Maternal Grandmother      Mental Illness Maternal Grandmother      Other Cancer Maternal Grandmother         Non Hodgkins Lymphoma     Diabetes Maternal Grandfather      Hypertension Maternal Grandfather      Prostate Cancer Maternal Grandfather      Hyperlipidemia Maternal Grandfather      Heart Disease Paternal Grandfather         heart disease     Hypertension Brother      Other Cancer Brother         Esophagecial     Diabetes Brother      Hyperlipidemia Mother      Mental Illness Mother      Anxiety Disorder Mother      Anesthesia Reaction Mother         Vomiting/Nausea     Hypertension Brother      Other Cancer Brother      Other Cancer Paternal Half-Brother         Esophageal       Past/family/social history reviewed and no changes    PHYSICAL EXAMINATION:  General appearance: Healthy appearing adult, in no acute distress  Eyes: Sclera anicteric, no erythema   Ears, nose, mouth and throat: No obvious external lesions of ears and nose, Hearing intact  Neck: Symmetric, No obvious external lesions  Respiratory: Normal respiration, no use of accessory muscles   Skin: No rashes or jaundice   Psychiatric: Oriented to person, place and time, Appropriate mood and affect.     PERTINENT STUDIES:    Orders Only on 11/13/2020   Component Date Value Ref Range Status     Sodium 11/13/2020 137  133 - 144 mmol/L Final     Potassium 11/13/2020 4.1  3.4 - 5.3  mmol/L Final     Chloride 11/13/2020 108  94 - 109 mmol/L Final     Carbon Dioxide 11/13/2020 24  20 - 32 mmol/L Final     Anion Gap 11/13/2020 5  3 - 14 mmol/L Final     Glucose 11/13/2020 114* 70 - 99 mg/dL Final     Urea Nitrogen 11/13/2020 14  7 - 30 mg/dL Final     Creatinine 11/13/2020 0.65  0.52 - 1.04 mg/dL Final     GFR Estimate 11/13/2020 >90  >60 mL/min/[1.73_m2] Final     GFR Estimate If Black 11/13/2020 >90  >60 mL/min/[1.73_m2] Final     Calcium 11/13/2020 9.5  8.5 - 10.1 mg/dL Final     Lithium Level 11/13/2020 0.86  0.60 - 1.20 mmol/L Final     Color Urine 11/13/2020 Yellow   Final     Appearance Urine 11/13/2020 Slightly Cloudy   Final     Glucose Urine 11/13/2020 Negative  NEG^Negative mg/dL Final     Bilirubin Urine 11/13/2020 Negative  NEG^Negative Final     Ketones Urine 11/13/2020 Negative  NEG^Negative mg/dL Final     Specific Gravity Urine 11/13/2020 1.015  1.003 - 1.035 Final     Blood Urine 11/13/2020 Trace* NEG^Negative Final     pH Urine 11/13/2020 6.5  5.0 - 7.0 pH Final     Protein Albumin Urine 11/13/2020 Negative  NEG^Negative mg/dL Final     Urobilinogen Urine 11/13/2020 0.2  0.2 - 1.0 EU/dL Final     Nitrite Urine 11/13/2020 Negative  NEG^Negative Final     Leukocyte Esterase Urine 11/13/2020 Trace* NEG^Negative Final     Source 11/13/2020 Midstream Urine   Final     WBC Urine 11/13/2020 0 - 5  OTO5^0 - 5 /HPF Final     RBC Urine 11/13/2020 2-5* OTO2^O - 2 /HPF Final     Squamous Epithelial /LPF Urine 11/13/2020 Moderate* FEW^Few /LPF Final     MRI brain: 10/2020  IMPRESSION:  1. No significant change in several nonspecific punctate/patchy  cerebral white matter signal abnormalities, with unchanged  differential diagnosis (as was delineated on the previous MRI brain  report). In a patient of this age, primary considerations would  include mild early chronic small vessel ischemic changes,  demyelinating disease, vascular headaches, or sequela of a remote  nonspecific previous insult.  "No new, enhancing, or diffusion  restricting lesions to suggest active disease. Clinical correlation is  recommended.  2. Otherwise unremarkable brain MRI.      Ayana Prasad is a 30 year old female who is being evaluated via a billable video visit.      The patient has been notified of following:     \"This video visit will be conducted via a call between you and your physician/provider. We have found that certain health care needs can be provided without the need for an in-person physical exam.  This service lets us provide the care you need with a video conversation.  If a prescription is necessary we can send it directly to your pharmacy.  If lab work is needed we can place an order for that and you can then stop by our lab to have the test done at a later time.    Video visits are billed at different rates depending on your insurance coverage.  Please reach out to your insurance provider with any questions.    If during the course of the call the physician/provider feels a video visit is not appropriate, you will not be charged for this service.\"    Patient has given verbal consent for Video visit? Yes  How would you like to obtain your AVS? MyChart  If you are dropped from the video visit, the video invite should be resent to: Text to cell phone: [phone]  Will anyone else be joining your video visit? No      Video-Visit Details    Type of service:  Video Visit    Video Start Time: 1:14 pm   Video End Time: 1:37 PM    Originating Location (pt. Location): Home    Distant Location (provider location):  [facility] [place] (provider's home)    Platform used for Video Visit: Elías Viramontes PA-C        "

## 2020-11-23 NOTE — PROGRESS NOTES
"Ayana Prasad is a 30 year old female who is being evaluated via a billable video visit.      The patient has been notified of following:     \"This video visit will be conducted via a call between you and your physician/provider. We have found that certain health care needs can be provided without the need for an in-person physical exam.  This service lets us provide the care you need with a video conversation.  If a prescription is necessary we can send it directly to your pharmacy.  If lab work is needed we can place an order for that and you can then stop by our lab to have the test done at a later time.    Video visits are billed at different rates depending on your insurance coverage.  Please reach out to your insurance provider with any questions.    If during the course of the call the physician/provider feels a video visit is not appropriate, you will not be charged for this service.\"    Patient has given verbal consent for Video visit? Yes  How would you like to obtain your AVS? MyChart  If you are dropped from the video visit, the video invite should be resent to: Text to cell phone: [phone]  Will anyone else be joining your video visit? No      Video-Visit Details    Type of service:  Video Visit    Video Start Time: 1:14 pm   Video End Time: 1:37 PM    Originating Location (pt. Location): Home    Distant Location (provider location):  [facility] [place] (provider's home)    Platform used for Video Visit: [employer]    Katiuska Viramontes PA-C      "

## 2020-11-27 ENCOUNTER — VIRTUAL VISIT (OUTPATIENT)
Dept: PHARMACY | Facility: CLINIC | Age: 30
End: 2020-11-27
Payer: COMMERCIAL

## 2020-11-27 DIAGNOSIS — H92.09 OTALGIA, UNSPECIFIED LATERALITY: ICD-10-CM

## 2020-11-27 DIAGNOSIS — R11.0 NAUSEA: Primary | ICD-10-CM

## 2020-11-27 DIAGNOSIS — F25.0 SCHIZOAFFECTIVE DISORDER, BIPOLAR TYPE (H): ICD-10-CM

## 2020-11-27 DIAGNOSIS — Z78.9 TAKES DIETARY SUPPLEMENTS: ICD-10-CM

## 2020-11-27 DIAGNOSIS — Z30.9 ENCOUNTER FOR CONTRACEPTIVE MANAGEMENT, UNSPECIFIED TYPE: ICD-10-CM

## 2020-11-27 PROCEDURE — 99605 MTMS BY PHARM NP 15 MIN: CPT | Mod: TEL | Performed by: PHARMACIST

## 2020-11-27 NOTE — PROGRESS NOTES
MTM ENCOUNTER  SUBJECTIVE/OBJECTIVE:                           Ayana Prasad is a 30 year old female called for an initial visit with me. She was referred to me from Katiuska Viramontes PA-C. Elissa Woodruff PharmD was on the call with us today as well, with permission from Ayana.     Last visit with MTM Lauren ArvizuD on 6/14/2019.     Reason for visit: Medication review for nausea    Allergies/ADRs: Reviewed in chart  Tobacco: She reports that she has been smoking dip, chew, snus or snuff and other. She started smoking about 9 years ago. She has a 2.50 pack-year smoking history. Her smokeless tobacco use includes chew.Tobacco Cessation Action Plan:  Information offered: Patient not interested at this time    1/2 pack per day - no interest in quitting   Alcohol: not currently using    Medication Adherence/Access: no issues reported    Nausea:   Almacone suspension as needed   Ondansetron ODT 4 mg every 8 hours as needed    Reports taking Zofran on a daily basis at this point. Somewhat helpful.    Taking almacone once weekly - is somewhat helpful. Nausea mostly in the morning and her stomach has dull pain. Reports sometimes this gets better if going without. Able to keep foods down right now. Vomits in the morning. Reports being on omeprazole in the past, didn't really feel like it was doing anything. Reports abdominal pain/symptoms started about a month and a week ago.     Last visit with Katiuska was 11/23. Plan to meet with me, stool studies, gastroparesis diet and ondansetron as needed. Ayana mentions all of this started when she was having some other neuro symptoms. She did have an appointment with a neurologist, which was canceled. MRI completed as ordered.    Schizoaffective Disorder, Bipolar Type:   Fluphenazine 25 mg IM every 14 days  Gabapentin 900 mg / 1200 mg / 900 mg  Vyvanse 40 mg daily (recent dose increase - 2 days) (update)  Lithium  mg / 900 mg   Propranolol 20 mg three times daily  (update)  Quetiapine 100 mg nightly     No reported concerns. States the Vyvanse was just increased. Follows with psychiatry. Notes indicate she is living at a sober house.    Lithium level 11/13/20: 0.86 [0.6-1.2 mmol/L]    Lab Results   Component Value Date    A1C 5.8 11/05/2018    A1C 5.8 10/03/2017     Ear Pain:   Ibuprofen 800 mg twice daily     Ear pain. Reports efficacy with ibuprofen, takes with a glass of milk. Sometimes abd pain in the morning. Reports taking ibuprofen for the past two weeks. No changes in GI symptoms. Reports piercing cartilage four times, ear swollen, but has gotten better. Pain goes away with ibuprofen, but if not then it is.     Contraception:  Xulane patch continuously     Started in July - though fill history indicates she has been on these before. No reported concerns.      Supplements:   Branched chain amino acids (1 scoops)      Not taking for anything in particular, mostly likes the taste of it.    ASSESSMENT:                            Medication Adherence: No issues identified    Nausea: Ayana would benefit from completing labs as indicated by Katiuska and following up with neurology given reported concern that her neurological symptoms started around the same time as the episodes of vomiting. Based a brief review of her chart, I did not see any clear medication changes around the time her symptoms started. Her medications that have been associated with nausea include: quetiapine, propranolol (minor), Xulane, lithium (level WNL), Vyvanse, ibuprofen (minor), gabapentin (minor).     Schizoaffective Disorder, Bipolar Type: Unchanged. Follow-up plan with psychiatry.     Ear Pain: We discussed the risks associated with NSAID use, especially continuously. Timeline does not seem to fit with description of symptoms for this to be the cause, but we discussed it would be a complicating factor. Encouraged her to consider gastro protection with something such as pantoprazole if she need to  continue to use this.    Contraception: Unchanged.    Supplements: Stable.    PLAN:                          Post Discharge Medication Reconciliation Status: discharge medications reconciled, continue medications without change.    1. Ayana to schedule with neurology as planned   2. Ayana to get labs ordered by Katiuska Viramontes PA-C  3. See above for medication review    I spent 26 minutes with this patient today. I offer these suggestions for consideration by her care team. A copy of the visit note was provided to the patient's referring provider.    Will follow up as needed, based on patient preference.    The patient was sent via [email or web address] a summary of these recommendations.     Sydney Goldman PharmD, BCACP  MTM Pharmacist   [facility]  Phone: [phone]    Patient consented to a telehealth visit: yes  Telemedicine Visit Details  Type of service:  Telephone visit  Start Time: 2:04 PM  End Time: 2:30 PM  Originating Location (patient location): [place]  Distant Location (provider location):  [facility] [place]  Mode of Communication:  Telephone

## 2020-12-03 DIAGNOSIS — F25.0 SCHIZOAFFECTIVE DISORDER, BIPOLAR TYPE (H): ICD-10-CM

## 2020-12-07 RX ORDER — FLUPHENAZINE DECANOATE 25 MG/ML
25 INJECTION, SOLUTION INTRAMUSCULAR; SUBCUTANEOUS
Qty: 2 ML | Refills: 0 | OUTPATIENT
Start: 2020-12-07

## 2020-12-07 NOTE — TELEPHONE ENCOUNTER
Medication requested: FLUPHENAZINE DEC 125MG/5ML  Last rx: 9-14-20   Qty: 6 ml: 0      Last seen: 7-22-20  RTC: 7-8 weeks  1) she chooses to continue Prolixin dec 25mg I32rehv (administered at [facility]),  Cancel: 1  No-show: 0  Next appt: none    Refill decision:   Refill pended and routed to the provider for review/determination due to  1 cancelled appt  Will send FYI to provider as is outside RTC timeframe.      Scheduling has been notified to contact the pt for appointment.

## 2020-12-07 NOTE — PATIENT INSTRUCTIONS
Recommendations from today's MTM visit:                                                    MTM (medication therapy management) is a service provided by a clinical pharmacist designed to help you get the most of out of your medicines.   Today we reviewed what your medicines are for, how to know if they are working, that your medicines are safe and how to make your medicine regimen as easy as possible.     1. Schedule an appointment with neurology as planned.    2. Please get labs done as indicated by Katiuska Viramontes PA-C.    3. In reviewing your medications, those that have been associated with nausea include: quetiapine, propranolol (minor), Xulane, lithium (level within normal limits recently), Vyvanse, ibuprofen (minor), gabapentin (minor). Please do not make any changes at this time without discussing with your providers. If your workup remains unremarkable, these may be areas we can take a closer look at.    It was great to speak with you today.  I value your experience and would be very thankful for your time with providing feedback on our clinic survey. You may receive a survey via email or text message in the next few days.     Next MTM visit: as needed, based on your preference    To schedule another MTM appointment, please call the clinic directly or you may call the MTM scheduling line at [phone] or toll-free at [phone].     My Clinical Pharmacist's contact information:                                                      It was a pleasure talking with you today!  Please feel free to contact me with any questions or concerns you have.      Sydney Goldman PharmD, BCACP  MTM Pharmacist   [facility] Rhe[facility]  Phone: [phone]

## 2020-12-08 ENCOUNTER — MYC MEDICAL ADVICE (OUTPATIENT)
Dept: FAMILY MEDICINE | Facility: CLINIC | Age: 30
End: 2020-12-08

## 2020-12-09 ENCOUNTER — VIRTUAL VISIT (OUTPATIENT)
Dept: FAMILY MEDICINE | Facility: CLINIC | Age: 30
End: 2020-12-09
Payer: COMMERCIAL

## 2020-12-09 DIAGNOSIS — F43.10 PTSD (POST-TRAUMATIC STRESS DISORDER): ICD-10-CM

## 2020-12-09 DIAGNOSIS — R19.7 DIARRHEA, UNSPECIFIED TYPE: ICD-10-CM

## 2020-12-09 DIAGNOSIS — R11.2 NON-INTRACTABLE VOMITING WITH NAUSEA, UNSPECIFIED VOMITING TYPE: Primary | ICD-10-CM

## 2020-12-09 DIAGNOSIS — F25.0 SCHIZOAFFECTIVE DISORDER, BIPOLAR TYPE (H): ICD-10-CM

## 2020-12-09 DIAGNOSIS — F60.3 BORDERLINE PERSONALITY DISORDER (H): ICD-10-CM

## 2020-12-09 LAB
C DIFF TOX B STL QL: POSITIVE
SPECIMEN SOURCE: ABNORMAL

## 2020-12-09 PROCEDURE — 87493 C DIFF AMPLIFIED PROBE: CPT | Mod: 59 | Performed by: PHYSICIAN ASSISTANT

## 2020-12-09 PROCEDURE — 99214 OFFICE O/P EST MOD 30 MIN: CPT | Mod: 95 | Performed by: NURSE PRACTITIONER

## 2020-12-09 PROCEDURE — 83993 ASSAY FOR CALPROTECTIN FECAL: CPT | Performed by: PHYSICIAN ASSISTANT

## 2020-12-09 PROCEDURE — 87506 IADNA-DNA/RNA PROBE TQ 6-11: CPT | Performed by: PHYSICIAN ASSISTANT

## 2020-12-09 ASSESSMENT — ENCOUNTER SYMPTOMS
FATIGUE: 0
SORE THROAT: 0
WHEEZING: 0
CONSTIPATION: 0
NAUSEA: 1
LIGHT-HEADEDNESS: 0
DIZZINESS: 0
RHINORRHEA: 0
EYE ITCHING: 0
COUGH: 0
HEADACHES: 0
DIARRHEA: 0
DIAPHORESIS: 0
EYE DISCHARGE: 0
FEVER: 0
VOMITING: 1
CHILLS: 0
SHORTNESS OF BREATH: 0
SINUS PRESSURE: 0

## 2020-12-09 NOTE — PROGRESS NOTES
"Ayana Prasad is a 30 year old female who is being evaluated via a billable telephone visit.      The patient has been notified of following:     \"This telephone visit will be conducted via a call between you and your physician/provider. We have found that certain health care needs can be provided without the need for a physical exam.  This service lets us provide the care you need with a short phone conversation.  If a prescription is necessary we can send it directly to your pharmacy.  If lab work is needed we can place an order for that and you can then stop by our lab to have the test done at a later time.    Telephone visits are billed at different rates depending on your insurance coverage. During this emergency period, for some insurers they may be billed the same as an in-person visit.  Please reach out to your insurance provider with any questions.    If during the course of the call the physician/provider feels a telephone visit is not appropriate, you will not be charged for this service.\"    Patient has given verbal consent for Telephone visit?  Yes    What phone number would you like to be contacted at? [phone]    How would you like to obtain your AVS? MyChart    Subjective     Ayana Prasad is a 30 year old female who presents via phone visit today for the following health issues:    HPI      Chief Complaint   Patient presents with     Follow up on Reflux     Forms       Phone call completed due to COVID-19 outbreak.     Needs to have a form filled out regarding mental health.  is requesting it be completed.  Ayana is unsure what form is for. Reports is not currently allowed to work due to current housing status and reports  does not want her to work.  Working status based off of mental condition and not back.  Continues to hear voices.  Last time that saw phyciatrist was on Dec 1st is going to [facility]. It is going okay.     Is currently taking 1-2 " ibuprofen per day. Is not eating when takes it. Takes ibuprofen for chronic back pain. Is currently taking zofran and also almacone. Contoinues to vomit. Has appointment with GI on Monday.  Is coming to office today to submit stool sample. Is currently on house restriction. Needs note stating that came in for labs.       Review of Systems   Constitutional: Negative for chills, diaphoresis, fatigue and fever.   HENT: Negative for ear discharge, ear pain, hearing loss, rhinorrhea, sinus pressure and sore throat.    Eyes: Negative for discharge and itching.   Respiratory: Negative for cough, shortness of breath and wheezing.    Gastrointestinal: Positive for nausea and vomiting. Negative for constipation and diarrhea.   Skin: Negative for rash.   Neurological: Negative for dizziness, light-headedness and headaches.           Objective          Vitals:  No vitals were obtained today due to virtual visit.    healthy, alert and no distress  PSYCH: Alert and oriented times 3; coherent speech, normal   rate and volume, able to articulate logical thoughts, able   to abstract reason, no tangential thoughts, no hallucinations   or delusions  Her affect is normal and pleasant  RESP: No cough, no audible wheezing, able to talk in full sentences  Remainder of exam unable to be completed due to telephone visits        Assessment/Plan:    Assessment & Plan     1. Non-intractable vomiting with nausea, unspecified vomiting type  Reinforced importance of taking ibuprofen with food.  Prescription given for omeprazole.  Continue to follow with GI  - omeprazole (PRILOSEC) 20 MG DR capsule; Take 1 capsule (20 mg) by mouth daily  Dispense: 30 capsule; Refill: 0    2. Schizoaffective disorder, bipolar type (H)  Form completed.  Will need long-term mental health care.  Ability to return to work will to be determined by psychiatry.    3. PTSD (post-traumatic stress disorder)  Form completed.  Will need long-term mental health care.  Ability  to return to work will to be determined by psychiatry.    4. Borderline personality disorder (H)  Form completed.  Will need long-term mental health care.  Ability to return to work will to be determined by psychiatry.          No follow-ups on file.    BROOKLYN Cruz [facility]    Phone call duration:  13 minutes

## 2020-12-09 NOTE — LETTER
[facility]  [address] [address] [address]  Phone: [phone]    December 9, 2020        Ayana Prasad  [address] [address]          RE: Ayana Piña was at our clinic today for a lab appointment.    Please contact me for questions or concerns.      Sincerely,        BROOKLYN Cruz CNP

## 2020-12-10 ENCOUNTER — HOSPITAL ENCOUNTER (EMERGENCY)
Facility: CLINIC | Age: 30
Discharge: HOME | End: 2020-12-10
Attending: EMERGENCY MEDICINE | Admitting: EMERGENCY MEDICINE
Payer: COMMERCIAL

## 2020-12-10 ENCOUNTER — APPOINTMENT (OUTPATIENT)
Dept: CT IMAGING | Facility: CLINIC | Age: 30
End: 2020-12-10
Attending: EMERGENCY MEDICINE
Payer: COMMERCIAL

## 2020-12-10 VITALS
TEMPERATURE: 98.2 F | SYSTOLIC BLOOD PRESSURE: 156 MMHG | RESPIRATION RATE: 16 BRPM | HEART RATE: 98 BPM | DIASTOLIC BLOOD PRESSURE: 88 MMHG | BODY MASS INDEX: 36.96 KG/M2 | HEIGHT: 66 IN | OXYGEN SATURATION: 98 % | WEIGHT: 230 LBS

## 2020-12-10 DIAGNOSIS — R10.84 ABDOMINAL PAIN, GENERALIZED: ICD-10-CM

## 2020-12-10 LAB
ALBUMIN SERPL-MCNC: 3.2 G/DL (ref 3.4–5)
ALBUMIN UR-MCNC: 10 MG/DL
ALP SERPL-CCNC: 52 U/L (ref 40–150)
ALT SERPL W P-5'-P-CCNC: 19 U/L (ref 0–50)
AMORPH CRY #/AREA URNS HPF: ABNORMAL /HPF
ANION GAP SERPL CALCULATED.3IONS-SCNC: 2 MMOL/L (ref 3–14)
APPEARANCE UR: ABNORMAL
AST SERPL W P-5'-P-CCNC: 8 U/L (ref 0–45)
BASOPHILS # BLD AUTO: 0.1 10E9/L (ref 0–0.2)
BASOPHILS NFR BLD AUTO: 0.5 %
BILIRUB SERPL-MCNC: 0.1 MG/DL (ref 0.2–1.3)
BILIRUB UR QL STRIP: NEGATIVE
BUN SERPL-MCNC: 14 MG/DL (ref 7–30)
C COLI+JEJUNI+LARI FUSA STL QL NAA+PROBE: NOT DETECTED
CALCIUM SERPL-MCNC: 9.8 MG/DL (ref 8.5–10.1)
CHLORIDE SERPL-SCNC: 109 MMOL/L (ref 94–109)
CO2 SERPL-SCNC: 27 MMOL/L (ref 20–32)
COLOR UR AUTO: YELLOW
CREAT SERPL-MCNC: 0.79 MG/DL (ref 0.52–1.04)
DIFFERENTIAL METHOD BLD: ABNORMAL
EC STX1 GENE STL QL NAA+PROBE: NOT DETECTED
EC STX2 GENE STL QL NAA+PROBE: NOT DETECTED
ENTERIC PATHOGEN COMMENT: NORMAL
EOSINOPHIL # BLD AUTO: 0.3 10E9/L (ref 0–0.7)
EOSINOPHIL NFR BLD AUTO: 1.8 %
ERYTHROCYTE [DISTWIDTH] IN BLOOD BY AUTOMATED COUNT: 14.7 % (ref 10–15)
GFR SERPL CREATININE-BSD FRML MDRD: >90 ML/MIN/{1.73_M2}
GLUCOSE SERPL-MCNC: 100 MG/DL (ref 70–99)
GLUCOSE UR STRIP-MCNC: NEGATIVE MG/DL
HCG UR QL: NEGATIVE
HCT VFR BLD AUTO: 40.4 % (ref 35–47)
HGB BLD-MCNC: 12.6 G/DL (ref 11.7–15.7)
HGB UR QL STRIP: NEGATIVE
IMM GRANULOCYTES # BLD: 0.1 10E9/L (ref 0–0.4)
IMM GRANULOCYTES NFR BLD: 0.5 %
KETONES UR STRIP-MCNC: NEGATIVE MG/DL
LEUKOCYTE ESTERASE UR QL STRIP: ABNORMAL
LIPASE SERPL-CCNC: 171 U/L (ref 73–393)
LYMPHOCYTES # BLD AUTO: 3.2 10E9/L (ref 0.8–5.3)
LYMPHOCYTES NFR BLD AUTO: 21.7 %
MCH RBC QN AUTO: 27.2 PG (ref 26.5–33)
MCHC RBC AUTO-ENTMCNC: 31.2 G/DL (ref 31.5–36.5)
MCV RBC AUTO: 87 FL (ref 78–100)
MONOCYTES # BLD AUTO: 0.8 10E9/L (ref 0–1.3)
MONOCYTES NFR BLD AUTO: 5.5 %
MUCOUS THREADS #/AREA URNS LPF: PRESENT /LPF
NEUTROPHILS # BLD AUTO: 10.4 10E9/L (ref 1.6–8.3)
NEUTROPHILS NFR BLD AUTO: 70 %
NITRATE UR QL: NEGATIVE
NOROV GI+II ORF1-ORF2 JNC STL QL NAA+PR: NOT DETECTED
NRBC # BLD AUTO: 0 10*3/UL
NRBC BLD AUTO-RTO: 0 /100
PH UR STRIP: 7.5 PH (ref 5–7)
PLATELET # BLD AUTO: 403 10E9/L (ref 150–450)
POTASSIUM SERPL-SCNC: 4 MMOL/L (ref 3.4–5.3)
PROT SERPL-MCNC: 7.9 G/DL (ref 6.8–8.8)
RBC # BLD AUTO: 4.63 10E12/L (ref 3.8–5.2)
RBC #/AREA URNS AUTO: 5 /HPF (ref 0–2)
RVA NSP5 STL QL NAA+PROBE: NOT DETECTED
SALMONELLA SP RPOD STL QL NAA+PROBE: NOT DETECTED
SHIGELLA SP+EIEC IPAH STL QL NAA+PROBE: NOT DETECTED
SODIUM SERPL-SCNC: 138 MMOL/L (ref 133–144)
SOURCE: ABNORMAL
SP GR UR STRIP: 1.01 (ref 1–1.03)
SQUAMOUS #/AREA URNS AUTO: 21 /HPF (ref 0–1)
UROBILINOGEN UR STRIP-MCNC: NORMAL MG/DL (ref 0–2)
V CHOL+PARA RFBL+TRKH+TNAA STL QL NAA+PR: NOT DETECTED
WBC # BLD AUTO: 14.8 10E9/L (ref 4–11)
WBC #/AREA URNS AUTO: 1 /HPF (ref 0–5)
Y ENTERO RECN STL QL NAA+PROBE: NOT DETECTED

## 2020-12-10 PROCEDURE — 99284 EMERGENCY DEPT VISIT MOD MDM: CPT | Performed by: EMERGENCY MEDICINE

## 2020-12-10 PROCEDURE — 250N000013 HC RX MED GY IP 250 OP 250 PS 637: Performed by: EMERGENCY MEDICINE

## 2020-12-10 PROCEDURE — 74177 CT ABD & PELVIS W/CONTRAST: CPT | Mod: 26 | Performed by: RADIOLOGY

## 2020-12-10 PROCEDURE — 83690 ASSAY OF LIPASE: CPT | Performed by: EMERGENCY MEDICINE

## 2020-12-10 PROCEDURE — 250N000009 HC RX 250: Performed by: EMERGENCY MEDICINE

## 2020-12-10 PROCEDURE — 250N000011 HC RX IP 250 OP 636: Performed by: EMERGENCY MEDICINE

## 2020-12-10 PROCEDURE — 99285 EMERGENCY DEPT VISIT HI MDM: CPT | Mod: 25 | Performed by: EMERGENCY MEDICINE

## 2020-12-10 PROCEDURE — 81025 URINE PREGNANCY TEST: CPT | Performed by: EMERGENCY MEDICINE

## 2020-12-10 PROCEDURE — 74177 CT ABD & PELVIS W/CONTRAST: CPT

## 2020-12-10 PROCEDURE — 80053 COMPREHEN METABOLIC PANEL: CPT | Performed by: EMERGENCY MEDICINE

## 2020-12-10 PROCEDURE — 85025 COMPLETE CBC W/AUTO DIFF WBC: CPT | Performed by: EMERGENCY MEDICINE

## 2020-12-10 PROCEDURE — 81001 URINALYSIS AUTO W/SCOPE: CPT | Performed by: EMERGENCY MEDICINE

## 2020-12-10 RX ORDER — IOPAMIDOL 755 MG/ML
135 INJECTION, SOLUTION INTRAVASCULAR ONCE
Status: COMPLETED | OUTPATIENT
Start: 2020-12-10 | End: 2020-12-10

## 2020-12-10 RX ADMIN — LIDOCAINE HYDROCHLORIDE 30 ML: 20 SOLUTION ORAL; TOPICAL at 16:35

## 2020-12-10 RX ADMIN — IOPAMIDOL 135 ML: 755 INJECTION, SOLUTION INTRAVENOUS at 16:01

## 2020-12-10 ASSESSMENT — ENCOUNTER SYMPTOMS
NECK STIFFNESS: 0
DYSURIA: 0
NECK PAIN: 0
CONFUSION: 0
FEVER: 0
COLOR CHANGE: 0
POLYDIPSIA: 0
VOMITING: 0
ADENOPATHY: 0
ABDOMINAL PAIN: 1
NAUSEA: 1
COUGH: 0
BACK PAIN: 0
ARTHRALGIAS: 0
BRUISES/BLEEDS EASILY: 0
DIFFICULTY URINATING: 0
EYE REDNESS: 0
HEADACHES: 0
CHILLS: 0
SHORTNESS OF BREATH: 0

## 2020-12-10 ASSESSMENT — MIFFLIN-ST. JEOR: SCORE: 1780.02

## 2020-12-10 NOTE — DISCHARGE INSTRUCTIONS
Please make an appointment to follow up with Your Primary Care Provider in 4-5 days for further evaluation and recommendations.  If your pain returns or you develop a fever 100.4  F or higher please come back to the emergency department.    *ABDOMINAL PAIN, UNCERTAIN CAUSE [Female]        Based on your visit today, the exact cause of your abdominal (stomach) pain is not certain. Your condition does not seem serious now; however, the signs of a serious problem may take more time to appear. Therefore, it is important for you to watch for any new symptoms or worsening of your condition.  HOME CARE:  Rest until your next exam. No strenuous activities.  Eat a diet low in fiber (called a low-residue diet). Foods allowed include refined breads, white rice, fruit and vegetable juices without pulp, tender meats. These foods will pass more easily through the intestine.  Avoid fried or fatty foods, dairy, alcohol and spicy foods until your symptoms go away.  FOLLOW UP with your doctor or this facility as instructed, or if your pain does not begin to improve in the next 24 hours.   GET PROMPT MEDICAL ATTENTION if any of the following occur:  Pain gets worse or moves to the right lower abdomen  New or worsening vomiting or diarrhea  Swelling of the abdomen  Unable to pass stool for more than three days  New fever over 101  F (38.3 C), or rising fever  Blood in vomit or bowel movements (dark red or black color)  Jaundice (yellow color of eyes and skin)  Weakness, dizziness or fainting  Chest, arm, back, neck or jaw pain  Unexpected vaginal bleeding or missed period    [id number] [employer], [address] [place] [address], [place] [address]. All rights reserved. This information is not intended as a substitute for professional medical care. Always follow your healthcare professional's instructions.

## 2020-12-10 NOTE — ED TRIAGE NOTES
Triage Assessment & Note:    There were no vitals taken for this visit.    Patient presents with: C/o sudden onset abdominal pain after eating lunch. PT reports pain is burning and sharp. PT endorses N/V/D. 4mg zofran by EMS    Home Treatments/Remedies: None    Febrile / Afebrile? Afebrile     Duration of C/o: Onset at 1215 today     Pan Reina RN  December 10, 2020

## 2020-12-10 NOTE — ED PROVIDER NOTES
[place] EMERGENCY DEPARTMENT ([facility])  12/10/20  History     Chief Complaint   Patient presents with     Abdominal Pain     The history is provided by the patient and medical records.     Ayana Prasad is a 30 year old female with a past medical history significant for cauda equina syndrome, neurogenic bladder, cannabis abuse, severe opioid abuse, left ovarian cyst, nephrolithiasis, type I bipolar disorder, schizoaffective disorder, BPD, depression, ADHD, anxiety, and TBI who is s/p cholecystectomy who presents to the Emergency Department for evaluation for abdominal pain.  Patient states that her abdominal pain started 30 minutes after eating lunch today.  She states the pain is diffuse in upper and mid abdomen and it is mild to moderate, sharp, nonradiating, constant, nothing makes it better or worse.  She did not take any medications for symptomatic relief. Patient denies any respiratory symptoms.  No cough, fever, or other URI symptoms.  Patient denies any associated nausea, or vomiting with this abdominal pain.  She does note one episode of vomiting earlier this morning when she woke up, but none since the onset of her abdominal pain.  She also denies any associated diarrhea with the abdominal pain, but endorses one episode of diarrhea when she woke up this morning.  Denies any history of pancreatitis in the past.    I have reviewed the Medications, Allergies, Past Medical and Surgical History, and Social History in the [employer] system.  PAST MEDICAL HISTORY:   Past Medical History:   Diagnosis Date     ADHD (attention deficit hyperactivity disorder)      Bipolar 1 disorder      Borderline personality disorder      Cauda equina syndrome      Chronic low back pain      Depression      Depressive disorder      GERD (gastroesophageal reflux disease)      h/o TBI (traumatic brain injury)      Marginal corneal ulcer, left 7/17/2015     Nephrolithiasis      Polysubstance abuse - methamphetamine,  hallucinagen, heroin, marijuana     currently in remission     PONV (postoperative nausea and vomiting)      PTSD (post-traumatic stress disorder)        PAST SURGICAL HISTORY:   Past Surgical History:   Procedure Laterality Date     BACK SURGERY - For Cauda Equina Syndrome  12/24/2016     COLONOSCOPY       COMBINED CYSTOSCOPY, INSERT STENT URETER(S) Left 8/30/2018    Procedure: COMBINED CYSTOSCOPY, INSERT STENT URETER(S);  Cystoscopy With Left Stent Placement;  Surgeon: Kiran Ulrich MD;  Location: [facility] OR     COMBINED CYSTOSCOPY, RETROGRADES, EXCHANGE STENT URETER(S) Left 10/14/2018    Procedure: COMBINED CYSTOSCOPY, RETROGRADES, EXCHANGE STENT URETER(S);  Cystoscopy and removal of left-sided stent.;  Surgeon: Stiven Olivo MD;  Location:  OR     COMBINED CYSTOSCOPY, RETROGRADES, URETEROSCOPY, INSERT STENT  1/6/2014    Procedure: COMBINED CYSTOSCOPY, RETROGRADES, URETEROSCOPY, INSERT STENT;  Cystyoscopy place left ureteral stent;  Surgeon: Jaun Kimble MD;  Location: [facility] OR     COMBINED CYSTOSCOPY, RETROGRADES, URETEROSCOPY, INSERT STENT Left 10/23/2018    Procedure: Video cystoscopy, left ureteroscopy with stone extraction;  Surgeon: Bull Mast MD;  Location:  OR     CYSTOSCOPY, URETEROSCOPY, COMBINED Right 9/23/2015    Procedure: COMBINED CYSTOSCOPY, URETEROSCOPY;  Surgeon: ROME Jett MD;  Location: [facility] OR     ENT SURGERY       ESOPHAGOSCOPY, GASTROSCOPY, DUODENOSCOPY (EGD), COMBINED  4/8/2013    Procedure: COMBINED ESOPHAGOSCOPY, GASTROSCOPY, DUODENOSCOPY (EGD), BIOPSY SINGLE OR MULTIPLE;  Gastroscopy;  Surgeon: Peter Pickard MD;  Location: [facility] GI     ESOPHAGOSCOPY, GASTROSCOPY, DUODENOSCOPY (EGD), COMBINED Left 10/28/2014    Procedure: COMBINED ESOPHAGOSCOPY, GASTROSCOPY, DUODENOSCOPY (EGD), BIOPSY SINGLE OR MULTIPLE;  Surgeon: Narcisa Ramirez MD;  Location:  OR     ESOPHAGOSCOPY, GASTROSCOPY, DUODENOSCOPY (EGD), COMBINED N/A 12/24/2018    Procedure:  COMBINED ESOPHAGOSCOPY, GASTROSCOPY, DUODENOSCOPY (EGD), BIOPSY SINGLE OR MULTIPLE;  Surgeon: Sonu Verduzco MD;  Location: [facility] GI     LAPAROSCOPIC CHOLECYSTECTOMY  11/20/2014    [facility], Dr. Ramirez     LASER HOLMIUM LITHOTRIPSY URETER(S), INSERT STENT, COMBINED  4/2/2014    Procedure: COMBINED CYSTOSCOPY, URETEROSCOPY, LASER HOLMIUM LITHOTRIPSY URETER(S), INSERT STENT;  Cystoscopy,Left Ureteral Stent Removal,Left Ureteroscopy with Laser Lithotripsy,Left Ureter Stent Placement;  Surgeon: ROME Jett MD;  Location: [facility] OR     Transurethral stone resection  03/11/2011       Past medical history, past surgical history, medications, and allergies were reviewed with the patient. Additional pertinent items: None    FAMILY HISTORY:   Family History   Problem Relation Age of Onset     Gastrointestinal Disease Father         Crohn's Disease     Cancer Father         Liver Cancer     Other Cancer Father         Liver     Cancer Maternal Grandmother         lympoma     Diabetes Maternal Grandmother      Mental Illness Maternal Grandmother      Other Cancer Maternal Grandmother         Non Hodgkins Lymphoma     Diabetes Maternal Grandfather      Hypertension Maternal Grandfather      Prostate Cancer Maternal Grandfather      Hyperlipidemia Maternal Grandfather      Heart Disease Paternal Grandfather         heart disease     Hypertension Brother      Other Cancer Brother         Esophagecial     Diabetes Brother      Hyperlipidemia Mother      Mental Illness Mother      Anxiety Disorder Mother      Anesthesia Reaction Mother         Vomiting/Nausea     Hypertension Brother      Other Cancer Brother      Other Cancer Paternal Half-Brother         Esophageal       SOCIAL HISTORY:   Social History     Tobacco Use     Smoking status: Current Every Day Smoker     Packs/day: 0.50     Years: 5.00     Pack years: 2.50     Types: Dip, chew, snus or snuff, Other     Start date: 8/1/2011     Smokeless tobacco: Current User      Types: Chew     Last attempt to quit: 12/17/2019   Substance Use Topics     Alcohol use: No     Alcohol/week: 0.0 standard drinks     Social history was reviewed with the patient. Additional pertinent items: None      Discharge Medication List as of 12/10/2020  5:50 PM      CONTINUE these medications which have NOT CHANGED    Details   ALMACONE -400-40 MG/5ML SUSP suspension TAKE 30MLS BY MOUTH EVERY 4 HOURS AS NEEDED FOR INDIGESTION, Disp-355 mL, R-0, E-PrescribeMaximum Refills Reached      fluPHENAZine decanoate (PROLIXIN) 25 MG/ML injection INJECT 1ML (25MG) INTRAMUSCULARLY EVERY 14 DAYS, Disp-6 mL,R-0, E-PrescribeMaximum Refills Reached      gabapentin (NEURONTIN) 300 MG capsule Take 900 mg in AM and 1200 mg mid day and 900 mg in PM, Disp-300 capsule,R-11, E-Prescribe      ibuprofen (ADVIL/MOTRIN) 800 MG tablet Take 1 tablet (800 mg) by mouth every 8 hours as needed for moderate pain, fever or pain Take with food, Disp-60 tablet, R-1, E-Prescribe      lisdexamfetamine (VYVANSE) 40 MG capsule Take 40 mg by mouth every morning , Historical      lithium ER (LITHOBID) 300 MG CR tablet Take one tab (1) each morning and three (3) tabs at bedtime, Disp-120 tablet,R-0, E-Prescribe      norelgestromin-ethinyl estradiol (XULANE) 150-35 MCG/24HR patch Place 1 patch onto the skin once a week May use continuously, Disp-12 patch,R-4, E-Prescribe      omeprazole (PRILOSEC) 20 MG DR capsule Take 1 capsule (20 mg) by mouth daily, Disp-30 capsule, R-0, E-Prescribe      ondansetron (ZOFRAN-ODT) 4 MG ODT tab Take 1 tablet (4 mg) by mouth every 8 hours as needed for nausea, Disp-60 tablet, R-0, E-Prescribe      propranolol (INDERAL) 20 MG tablet Take 20 mg by mouth 3 times daily , Historical      QUEtiapine (SEROQUEL) 100 MG tablet Take 1 tablet (100 mg) by mouth At Bedtime, Disp-30 tablet,R-0, E-Prescribe                Allergies   Allergen Reactions     Haldol [Haloperidol] Other (See Comments)     Makes patient very  "angry and anxious     Adhesive Tape Hives     Percocet [Oxycodone-Acetaminophen] Nausea and Vomiting     Prednisone Other (See Comments) and Hives     Suicidal ideation     Risperidone Other (See Comments)     Tramadol Hcl Nausea and Vomiting     Droperidol Anxiety     Seroquel [Quetiapine] Palpitations     Spent 2 weeks in the hospital due to having seroquel, caused palpitations and QT prolongation        Review of Systems   Constitutional: Negative for chills and fever.   HENT: Negative for congestion.    Eyes: Negative for redness.   Respiratory: Negative for cough and shortness of breath.    Cardiovascular: Negative for chest pain.   Gastrointestinal: Positive for abdominal pain and nausea. Negative for vomiting.   Endocrine: Negative for polydipsia and polyuria.   Genitourinary: Negative for difficulty urinating, dysuria and pelvic pain.   Musculoskeletal: Negative for arthralgias, back pain, neck pain and neck stiffness.   Skin: Negative for color change.   Allergic/Immunologic: Negative for immunocompromised state.   Neurological: Negative for headaches.   Hematological: Negative for adenopathy. Does not bruise/bleed easily.   Psychiatric/Behavioral: Negative for confusion.   All other systems reviewed and are negative.    A complete review of systems was performed with pertinent positives and negatives noted in the HPI, and all other systems negative.    Physical Exam   BP: (!) 147/98  Pulse: 110  Temp: 98.2  F (36.8  C)  Resp: 16  Height: 167.6 cm (5' 6\")  Weight: 104.3 kg (230 lb)  SpO2: 98 %      Physical Exam  Vitals signs and nursing note reviewed.   Constitutional:       General: She is not in acute distress.     Appearance: Normal appearance. She is not diaphoretic.   HENT:      Head: Normocephalic and atraumatic.      Mouth/Throat:      Pharynx: No oropharyngeal exudate.   Eyes:      General: No scleral icterus.     Extraocular Movements: Extraocular movements intact.      Conjunctiva/sclera: " Conjunctivae normal.   Neck:      Musculoskeletal: Normal range of motion and neck supple.   Cardiovascular:      Rate and Rhythm: Tachycardia present.      Heart sounds: Normal heart sounds.   Pulmonary:      Effort: Pulmonary effort is normal. No respiratory distress.      Breath sounds: Normal breath sounds. No wheezing, rhonchi or rales.   Abdominal:      General: Bowel sounds are normal. There is no distension.      Palpations: Abdomen is soft.      Tenderness: There is abdominal tenderness ( mild, epigastric). There is no right CVA tenderness, left CVA tenderness, guarding or rebound.      Hernia: No hernia is present.   Musculoskeletal: Normal range of motion.         General: No tenderness.      Right lower leg: No edema.      Left lower leg: No edema.   Skin:     General: Skin is warm.      Findings: No rash.   Neurological:      General: No focal deficit present.      Mental Status: She is alert and oriented to person, place, and time.      Cranial Nerves: No cranial nerve deficit.      Coordination: Coordination normal.   Psychiatric:         Mood and Affect: Mood normal.         Behavior: Behavior normal.         Thought Content: Thought content normal.         Judgment: Judgment normal.         ED Course        Procedures                           Results for orders placed or performed during the hospital encounter of 12/10/20 (from the past 24 hour(s))   HCG qualitative urine (UPT)   Result Value Ref Range    HCG Qual Urine Negative NEG^Negative   UA with Microscopic   Result Value Ref Range    Color Urine Yellow     Appearance Urine Cloudy     Glucose Urine Negative NEG^Negative mg/dL    Bilirubin Urine Negative NEG^Negative    Ketones Urine Negative NEG^Negative mg/dL    Specific Gravity Urine 1.012 1.003 - 1.035    Blood Urine Negative NEG^Negative    pH Urine 7.5 (H) 5.0 - 7.0 pH    Protein Albumin Urine 10 (A) NEG^Negative mg/dL    Urobilinogen mg/dL Normal 0.0 - 2.0 mg/dL    Nitrite Urine  Negative NEG^Negative    Leukocyte Esterase Urine Moderate (A) NEG^Negative    Source Urine     WBC Urine 1 0 - 5 /HPF    RBC Urine 5 (H) 0 - 2 /HPF    Squamous Epithelial /HPF Urine 21 (H) 0 - 1 /HPF    Mucous Urine Present (A) NEG^Negative /LPF    Amorphous Crystals Few (A) NEG^Negative /HPF   CBC with platelets differential   Result Value Ref Range    WBC 14.8 (H) 4.0 - 11.0 10e9/L    RBC Count 4.63 3.8 - 5.2 10e12/L    Hemoglobin 12.6 11.7 - 15.7 g/dL    Hematocrit 40.4 35.0 - 47.0 %    MCV 87 78 - 100 fl    MCH 27.2 26.5 - 33.0 pg    MCHC 31.2 (L) 31.5 - 36.5 g/dL    RDW 14.7 10.0 - 15.0 %    Platelet Count 403 150 - 450 10e9/L    Diff Method Automated Method     % Neutrophils 70.0 %    % Lymphocytes 21.7 %    % Monocytes 5.5 %    % Eosinophils 1.8 %    % Basophils 0.5 %    % Immature Granulocytes 0.5 %    Nucleated RBCs 0 0 /100    Absolute Neutrophil 10.4 (H) 1.6 - 8.3 10e9/L    Absolute Lymphocytes 3.2 0.8 - 5.3 10e9/L    Absolute Monocytes 0.8 0.0 - 1.3 10e9/L    Absolute Eosinophils 0.3 0.0 - 0.7 10e9/L    Absolute Basophils 0.1 0.0 - 0.2 10e9/L    Abs Immature Granulocytes 0.1 0 - 0.4 10e9/L    Absolute Nucleated RBC 0.0    Comprehensive metabolic panel   Result Value Ref Range    Sodium 138 133 - 144 mmol/L    Potassium 4.0 3.4 - 5.3 mmol/L    Chloride 109 94 - 109 mmol/L    Carbon Dioxide 27 20 - 32 mmol/L    Anion Gap 2 (L) 3 - 14 mmol/L    Glucose 100 (H) 70 - 99 mg/dL    Urea Nitrogen 14 7 - 30 mg/dL    Creatinine 0.79 0.52 - 1.04 mg/dL    GFR Estimate >90 >60 mL/min/[1.73_m2]    GFR Estimate If Black >90 >60 mL/min/[1.73_m2]    Calcium 9.8 8.5 - 10.1 mg/dL    Bilirubin Total 0.1 (L) 0.2 - 1.3 mg/dL    Albumin 3.2 (L) 3.4 - 5.0 g/dL    Protein Total 7.9 6.8 - 8.8 g/dL    Alkaline Phosphatase 52 40 - 150 U/L    ALT 19 0 - 50 U/L    AST 8 0 - 45 U/L   Lipase   Result Value Ref Range    Lipase 171 73 - 393 U/L   CT Abdomen Pelvis w Contrast    Narrative    EXAMINATION: CT ABDOMEN PELVIS W CONTRAST,  12/10/2020 4:10 PM    TECHNIQUE: Axial CT images from the lung bases through the symphysis  pubis were obtained  with IV contrast. Coronal and sagittal reformats  also provided. Contrast dose: iopamidol (ISOVUE-370) solution 135 mL    COMPARISON: Ultrasound abdomen 12/13/2019 CT 10/22/2018    HISTORY: diffuse, upper and mi-abdominal pain; concern for obstruction    FINDINGS:    Lung Bases:   The visualized lung bases and lower mediastinal structures are  unremarkable.    ABDOMEN:  Liver: Normal size. No focal lesions.    Biliary/Gallbladder: Surgically absent. No intra or extrahepatic  biliary dilatation.    Spleen: Normal size. No focal lesions.    Pancreas: No evidence of pancreatic mass or peripancreatic fluid.    Adrenals: Normal.    Kidneys: Multiple nonobstructing right renal calculi, largest  measuring 5 mm in the lower pole. Punctate calculus in the left  interpolar region. No hydronephrosis, calculi or mass.    Urinary bladder: Unremarkable.    Reproductive organs: Uterus is normal. No adnexal abnormality.    Gastrointestinal: The stomach and duodenum are unremarkable. Small and  large bowel are normal in caliber and without abnormal wall  thickening. The appendix is normal (series 3, image 71).    Mesentery/Peritoneum: No ascites or pneumoperitoneum.    Lymph nodes: No lymphadenopathy.    Vasculature: Major abdominal arteries are patent.    Bones and soft tissues: No aggressive lytic or sclerotic lesions.  Degenerative changes at L5-S1. Small fat-containing umbilical hernia.      Impression    IMPRESSION:   1.  No evidence of bowel obstruction or other acute abnormality in the  abdomen/pelvis.  2.  Nonobstructing bilateral renal calculi.     MOISÉS DAWN MD     *Note: Due to a large number of results and/or encounters for the requested time period, some results have not been displayed. A complete set of results can be found in Results Review.     Medications   lidocaine (XYLOCAINE) 2 % 15 mL, alum & mag  hydroxide-simethicone (MAALOX) 15 mL GI Cocktail (30 mLs Oral Given 12/10/20 1635)   iopamidol (ISOVUE-370) solution 135 mL (135 mLs Intravenous Given 12/10/20 1601)   sodium chloride (PF) 0.9% PF flush 84 mL (84 mLs Intravenous Given 12/10/20 1601)             Assessments & Plan (with Medical Decision Making)   This is a 30 year old female presenting with sudden onset of upper and mid abdominal pain after eating with nausea. Differential diagnosis: Acute pancreatitis, choledocholithiasis, SBO, unlikely volvulus, PUD, gastritis, acute appendicitis.    After thorough history and physical examination, patient appears to be in mild distress due to pain.  I will treat her pain oral GI cocktail and obtain laboratory studies and CT abdomen/pelvis for further diagnostic evaluation.  Patient agrees with the plan.    Patient's laboratory studies returned with leukocytosis of 14,800. There is no evidence of anemia, hemoglobin is normal at 12.6.  Electrolytes show no evidence of dehydration, creatinine is normal at 0.79.  LFTs and lipase are normal.  hCG is negative.  Urinalysis shows no obvious signs of infection; however, it was a rather contaminated sample with a significant amount of squamous cells.  I reviewed patient's CT abdomen/pelvis and I read the radiology report; there is no evidence of bowel obstruction or other acute abnormality in the abdomen/pelvis.  I do not suspect acute appendicitis given patient has never had pain in her lower abdomen.  On reexamination her abdomen is soft, nontender, nondistended.  She states that the pain has resolved and she is at her baseline now.  It is unclear to me what the etiology of her pain is; however, she does not need to be admitted to the hospital at this time.  She will follow up with her PCP next week and return to the Emergency Department if her symptoms return or if she develops a fever.  At this time she is stable for discharge.    This part of the medical record was  transcribed by Drake Monzon, Medical Scribe, from a dictation done by Julien Barron MD.       I have reviewed the nursing notes.    I have reviewed the findings, diagnosis, plan and need for follow up with the patient.    Discharge Medication List as of 12/10/2020  5:50 PM          Final diagnoses:   Abdominal pain, generalized   I, Jay Lamb, am serving as a trained medical scribe to document services personally performed by Vee Barron MD, based on the provider's statements to me.      I, Vee Barron MD, was physically present and have reviewed and verified the accuracy of this note documented by Jay Lamb.     12/10/2020   [facility] EMERGENCY DEPARTMENT     Vee Barron MD  12/10/20 1851

## 2020-12-10 NOTE — ED AVS SNAPSHOT
[facility] Emergency Department  [address] [address]  Phone: [phone]                                    Ayana Prasad   MRN: [id number]    Department: [facility] Emergency Department   Date of Visit: 12/10/2020           After Visit Summary Signature Page    I have received my discharge instructions, and my questions have been answered. I have discussed any challenges I see with this plan with the nurse or doctor.    ..........................................................................................................................................  Patient/Patient Representative Signature      ..........................................................................................................................................  Patient Representative Print Name and Relationship to Patient    ..................................................               ................................................  Date                                   Time    ..........................................................................................................................................  Reviewed by Signature/Title    ...................................................              ..............................................  Date                                               Time          22EPIC Rev 08/18

## 2020-12-11 ENCOUNTER — PATIENT OUTREACH (OUTPATIENT)
Dept: CARE COORDINATION | Facility: CLINIC | Age: 30
End: 2020-12-11

## 2020-12-11 ENCOUNTER — MYC MEDICAL ADVICE (OUTPATIENT)
Dept: FAMILY MEDICINE | Facility: CLINIC | Age: 30
End: 2020-12-11

## 2020-12-11 ENCOUNTER — TELEPHONE (OUTPATIENT)
Dept: GASTROENTEROLOGY | Facility: CLINIC | Age: 30
End: 2020-12-11

## 2020-12-11 DIAGNOSIS — A04.72 C. DIFFICILE COLITIS: ICD-10-CM

## 2020-12-11 DIAGNOSIS — A04.72 C. DIFFICILE COLITIS: Primary | ICD-10-CM

## 2020-12-11 DIAGNOSIS — Z71.89 OTHER SPECIFIED COUNSELING: ICD-10-CM

## 2020-12-11 LAB — CALPROTECTIN STL-MCNT: 15 MG/KG (ref 0–49.9)

## 2020-12-11 RX ORDER — VANCOMYCIN HYDROCHLORIDE 125 MG/1
125 CAPSULE ORAL 4 TIMES DAILY
Qty: 40 CAPSULE | Refills: 0 | Status: ON HOLD | OUTPATIENT
Start: 2020-12-11 | End: 2020-12-19

## 2020-12-11 RX ORDER — VANCOMYCIN HYDROCHLORIDE 125 MG/1
125 CAPSULE ORAL 4 TIMES DAILY
Qty: 40 CAPSULE | Refills: 0 | Status: SHIPPED | OUTPATIENT
Start: 2020-12-11 | End: 2020-12-11

## 2020-12-11 NOTE — TELEPHONE ENCOUNTER
Patient called to discuss recent stool studies. Voicemail is full, unable to send message. Will leave patient MyChart message.     Katiuska Viramontes PA-C  Division of Gastroenterology, Hepatology & Nutrition  [facility]

## 2020-12-11 NOTE — PROGRESS NOTES
Clinic Care Coordination Contact  [facility]/Voicemail    CHW Outreach attempted x 1.  Left message on patient's voicemail with call back information and requested return call.  Plan: CHW will try to reach patient again in 1-2 business days.    Suzie CUETO Community Health Worker  [facility] Clinic Care Coordination  [facility]  Phone: [phone]

## 2020-12-11 NOTE — TELEPHONE ENCOUNTER
See result note and telephone orders from 12/10/20.  GI recommend the vancomycin and this was sent today by GI, but patient restricted.   RN attempted to call script in to pharmacy and was directly put in voice mail.  Script is pended as per GI and will route to provider listed below who is in clinic to scotty Avery, Becki Quinones, APRN CNP   9:03 AM  Note     Normally, the ordering provider treats.  However, Ayana is a restricted patient.  Only certain providers are able to prescribe for her.  I would like to await GIs recommendations regarding treatment and then I would be happy to send that prescription for her.  If I am unavailable please verbal order the prescriptions from me.  I do believe that Dr. Domingo, Kristin Martin, and Kacey Godfrey are all approved to prescribe for her as well.  We have previously completed that paperwork with her insurance company.     Becki CARVALHOC        Viewed by Ayana Prasad on 12/11/2020 12:50 PM  Written by Katiuska Viramontes PA-C on 12/11/2020 12:47 PM  Reggie Jamesie,     I just called but was not able to leave a message as your voicemail is full.     I see that your stool test returned positive for C diff. The remaining tests are negative. If you are having watery stools multiple times a day, please take the vancomycin medication. I sent this to your pharmacy. If you are no longer having diarrhea, do not take the medication.     If you have any questions, please send a [employer] message or call [phone] and let me know when would be a good time to call you back.     Thanks,     Katiuska Viramontes PA-C   Division of Gastroenterology, Hepatology & Nutrition   [facility]

## 2020-12-12 ENCOUNTER — HOSPITAL ENCOUNTER (INPATIENT)
Facility: CLINIC | Age: 30
LOS: 6 days | Discharge: HOME | End: 2020-12-19
Attending: EMERGENCY MEDICINE | Admitting: HOSPITALIST
Payer: COMMERCIAL

## 2020-12-12 DIAGNOSIS — R19.7 DIARRHEA, UNSPECIFIED TYPE: Primary | ICD-10-CM

## 2020-12-12 DIAGNOSIS — Z20.828 EXPOSURE TO SARS-ASSOCIATED CORONAVIRUS: ICD-10-CM

## 2020-12-12 DIAGNOSIS — F31.11 BIPOLAR 1 DISORDER, MANIC, MILD (H): Chronic | ICD-10-CM

## 2020-12-12 DIAGNOSIS — R46.89 AGGRESSIVE BEHAVIOR: ICD-10-CM

## 2020-12-12 PROCEDURE — C9803 HOPD COVID-19 SPEC COLLECT: HCPCS

## 2020-12-12 PROCEDURE — 99285 EMERGENCY DEPT VISIT HI MDM: CPT | Performed by: EMERGENCY MEDICINE

## 2020-12-12 PROCEDURE — 99285 EMERGENCY DEPT VISIT HI MDM: CPT | Mod: 25

## 2020-12-12 PROCEDURE — 90791 PSYCH DIAGNOSTIC EVALUATION: CPT

## 2020-12-13 LAB
AMPHETAMINES UR QL SCN: POSITIVE
BARBITURATES UR QL: NEGATIVE
BENZODIAZ UR QL: NEGATIVE
CANNABINOIDS UR QL SCN: NEGATIVE
COCAINE UR QL: NEGATIVE
ETHANOL UR QL SCN: NEGATIVE
FLUAV+FLUBV RNA SPEC QL NAA+PROBE: NEGATIVE
FLUAV+FLUBV RNA SPEC QL NAA+PROBE: NEGATIVE
HCG UR QL: NEGATIVE
LABORATORY COMMENT REPORT: NORMAL
OPIATES UR QL SCN: NEGATIVE
RSV RNA SPEC QL NAA+PROBE: NEGATIVE
SARS-COV-2 RNA SPEC QL NAA+PROBE: NEGATIVE
SPECIMEN SOURCE: NORMAL

## 2020-12-13 PROCEDURE — 120N000002 HC R&B MED SURG/OB UMMC

## 2020-12-13 PROCEDURE — 250N000013 HC RX MED GY IP 250 OP 250 PS 637: Performed by: EMERGENCY MEDICINE

## 2020-12-13 PROCEDURE — 250N000013 HC RX MED GY IP 250 OP 250 PS 637: Performed by: HOSPITALIST

## 2020-12-13 PROCEDURE — 81025 URINE PREGNANCY TEST: CPT | Performed by: EMERGENCY MEDICINE

## 2020-12-13 PROCEDURE — 250N000011 HC RX IP 250 OP 636: Performed by: HOSPITALIST

## 2020-12-13 PROCEDURE — 80307 DRUG TEST PRSMV CHEM ANLYZR: CPT | Performed by: EMERGENCY MEDICINE

## 2020-12-13 PROCEDURE — 99223 1ST HOSP IP/OBS HIGH 75: CPT | Mod: AI | Performed by: HOSPITALIST

## 2020-12-13 PROCEDURE — 80320 DRUG SCREEN QUANTALCOHOLS: CPT | Performed by: EMERGENCY MEDICINE

## 2020-12-13 PROCEDURE — 87636 SARSCOV2 & INF A&B AMP PRB: CPT | Performed by: EMERGENCY MEDICINE

## 2020-12-13 RX ORDER — HYDROXYZINE HYDROCHLORIDE 25 MG/1
50 TABLET, FILM COATED ORAL
Status: COMPLETED | OUTPATIENT
Start: 2020-12-13 | End: 2020-12-13

## 2020-12-13 RX ORDER — LITHIUM CARBONATE 450 MG
900 TABLET, EXTENDED RELEASE ORAL AT BEDTIME
Status: DISCONTINUED | OUTPATIENT
Start: 2020-12-13 | End: 2020-12-19 | Stop reason: HOSPADM

## 2020-12-13 RX ORDER — GABAPENTIN 300 MG/1
900 CAPSULE ORAL 2 TIMES DAILY
Status: DISCONTINUED | OUTPATIENT
Start: 2020-12-13 | End: 2020-12-19 | Stop reason: HOSPADM

## 2020-12-13 RX ORDER — SODIUM CHLORIDE AND POTASSIUM CHLORIDE 150; 900 MG/100ML; MG/100ML
INJECTION, SOLUTION INTRAVENOUS CONTINUOUS
Status: DISCONTINUED | OUTPATIENT
Start: 2020-12-13 | End: 2020-12-14

## 2020-12-13 RX ORDER — GABAPENTIN 300 MG/1
900 CAPSULE ORAL ONCE
Status: COMPLETED | OUTPATIENT
Start: 2020-12-13 | End: 2020-12-13

## 2020-12-13 RX ORDER — NORELGESTROMIN AND ETHINYL ESTRADIOL 35; 150 UG/MG; UG/MG
1 PATCH TRANSDERMAL WEEKLY
Status: DISCONTINUED | OUTPATIENT
Start: 2020-12-13 | End: 2020-12-19 | Stop reason: HOSPADM

## 2020-12-13 RX ORDER — ONDANSETRON 2 MG/ML
4 INJECTION INTRAMUSCULAR; INTRAVENOUS EVERY 6 HOURS PRN
Status: DISCONTINUED | OUTPATIENT
Start: 2020-12-13 | End: 2020-12-19 | Stop reason: HOSPADM

## 2020-12-13 RX ORDER — NICOTINE 21 MG/24HR
1 PATCH, TRANSDERMAL 24 HOURS TRANSDERMAL DAILY
Status: DISCONTINUED | OUTPATIENT
Start: 2020-12-13 | End: 2020-12-19

## 2020-12-13 RX ORDER — VANCOMYCIN HYDROCHLORIDE 125 MG/1
125 CAPSULE ORAL 4 TIMES DAILY
Status: DISCONTINUED | OUTPATIENT
Start: 2020-12-13 | End: 2020-12-17

## 2020-12-13 RX ORDER — LISDEXAMFETAMINE DIMESYLATE 20 MG/1
40 CAPSULE ORAL DAILY
Status: DISCONTINUED | OUTPATIENT
Start: 2020-12-14 | End: 2020-12-19 | Stop reason: HOSPADM

## 2020-12-13 RX ORDER — QUETIAPINE FUMARATE 100 MG/1
100 TABLET, FILM COATED ORAL AT BEDTIME
Status: DISCONTINUED | OUTPATIENT
Start: 2020-12-13 | End: 2020-12-19 | Stop reason: HOSPADM

## 2020-12-13 RX ORDER — GABAPENTIN 600 MG/1
300 TABLET ORAL ONCE
Status: COMPLETED | OUTPATIENT
Start: 2020-12-13 | End: 2020-12-13

## 2020-12-13 RX ORDER — PROPRANOLOL HYDROCHLORIDE 20 MG/1
20 TABLET ORAL 3 TIMES DAILY
Status: DISCONTINUED | OUTPATIENT
Start: 2020-12-13 | End: 2020-12-14

## 2020-12-13 RX ORDER — ONDANSETRON 4 MG/1
4 TABLET, ORALLY DISINTEGRATING ORAL EVERY 6 HOURS PRN
Status: DISCONTINUED | OUTPATIENT
Start: 2020-12-13 | End: 2020-12-19 | Stop reason: HOSPADM

## 2020-12-13 RX ORDER — OLANZAPINE 10 MG/1
10 TABLET, ORALLY DISINTEGRATING ORAL
Status: COMPLETED | OUTPATIENT
Start: 2020-12-13 | End: 2020-12-13

## 2020-12-13 RX ORDER — ACETAMINOPHEN 325 MG/1
650 TABLET ORAL EVERY 4 HOURS PRN
Status: DISCONTINUED | OUTPATIENT
Start: 2020-12-13 | End: 2020-12-17

## 2020-12-13 RX ORDER — LISDEXAMFETAMINE DIMESYLATE 40 MG/1
40 CAPSULE ORAL ONCE
Status: COMPLETED | OUTPATIENT
Start: 2020-12-13 | End: 2020-12-13

## 2020-12-13 RX ORDER — LIDOCAINE 40 MG/G
CREAM TOPICAL
Status: DISCONTINUED | OUTPATIENT
Start: 2020-12-13 | End: 2020-12-19 | Stop reason: HOSPADM

## 2020-12-13 RX ORDER — LITHIUM CARBONATE 300 MG/1
300 TABLET, FILM COATED, EXTENDED RELEASE ORAL DAILY
Status: DISCONTINUED | OUTPATIENT
Start: 2020-12-14 | End: 2020-12-19 | Stop reason: HOSPADM

## 2020-12-13 RX ORDER — LITHIUM CARBONATE 300 MG/1
300 TABLET, FILM COATED, EXTENDED RELEASE ORAL ONCE
Status: COMPLETED | OUTPATIENT
Start: 2020-12-13 | End: 2020-12-13

## 2020-12-13 RX ORDER — GABAPENTIN 400 MG/1
1200 CAPSULE ORAL DAILY
Status: DISCONTINUED | OUTPATIENT
Start: 2020-12-14 | End: 2020-12-19 | Stop reason: HOSPADM

## 2020-12-13 RX ORDER — MAGNESIUM HYDROXIDE/ALUMINUM HYDROXICE/SIMETHICONE 120; 1200; 1200 MG/30ML; MG/30ML; MG/30ML
30 SUSPENSION ORAL EVERY 4 HOURS PRN
Status: DISCONTINUED | OUTPATIENT
Start: 2020-12-13 | End: 2020-12-19 | Stop reason: HOSPADM

## 2020-12-13 RX ADMIN — OMEPRAZOLE 20 MG: 20 CAPSULE, DELAYED RELEASE ORAL at 12:29

## 2020-12-13 RX ADMIN — GABAPENTIN 900 MG: 300 CAPSULE ORAL at 15:33

## 2020-12-13 RX ADMIN — POTASSIUM CHLORIDE AND SODIUM CHLORIDE: 900; 150 INJECTION, SOLUTION INTRAVENOUS at 19:22

## 2020-12-13 RX ADMIN — Medication 300 MG: at 15:33

## 2020-12-13 RX ADMIN — LITHIUM CARBONATE 900 MG: 450 TABLET, EXTENDED RELEASE ORAL at 21:08

## 2020-12-13 RX ADMIN — NICOTINE POLACRILEX 2 MG: 2 GUM, CHEWING ORAL at 17:57

## 2020-12-13 RX ADMIN — OLANZAPINE 10 MG: 10 TABLET, ORALLY DISINTEGRATING ORAL at 03:00

## 2020-12-13 RX ADMIN — VANCOMYCIN HYDROCHLORIDE 125 MG: 125 CAPSULE ORAL at 21:08

## 2020-12-13 RX ADMIN — LISDEXAMFETAMINE DIMESYLATE 40 MG: 20 CAPSULE ORAL at 12:34

## 2020-12-13 RX ADMIN — QUETIAPINE FUMARATE 100 MG: 100 TABLET ORAL at 21:53

## 2020-12-13 RX ADMIN — HYDROXYZINE HYDROCHLORIDE 50 MG: 25 TABLET, FILM COATED ORAL at 03:00

## 2020-12-13 RX ADMIN — PROPRANOLOL HYDROCHLORIDE 20 MG: 20 TABLET ORAL at 21:09

## 2020-12-13 RX ADMIN — NICOTINE 1 PATCH: 14 PATCH, EXTENDED RELEASE TRANSDERMAL at 17:57

## 2020-12-13 RX ADMIN — NORELGESTROMIN AND ETHINYL ESTRADIOL 1 PATCH: 150; 35 PATCH TRANSDERMAL at 21:53

## 2020-12-13 RX ADMIN — GABAPENTIN 900 MG: 300 CAPSULE ORAL at 21:08

## 2020-12-13 RX ADMIN — NICOTINE POLACRILEX 2 MG: 2 GUM, CHEWING ORAL at 21:09

## 2020-12-13 RX ADMIN — LITHIUM CARBONATE 300 MG: 300 TABLET, EXTENDED RELEASE ORAL at 12:33

## 2020-12-13 ASSESSMENT — ENCOUNTER SYMPTOMS: HALLUCINATIONS: 1

## 2020-12-13 NOTE — ED NOTES
Enid brought into patient's room.  Informed patient that urine sample has been ordered.  Patient stated she did not need to use the bathroom at this time and was unable to provide a urine sample.  Asked patient again to see if she would allow a covid swab, and patient refused.

## 2020-12-13 NOTE — ED NOTES
Refusing vitals and morning meds at this time--finally agreed to all vitals except temp; still refusing meds; able to walk with steady gait
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
## 2020-12-13 NOTE — ED NOTES
Emergency Department Patient Sign-out       Brief HPI and ED course:  Patient is a 30 year old female signed out to me by Dr. Diane.  See initial ED Provider note for details of the presentation. In brief, patient suicidal and with aggression from group home. Admitted to behavioral health on 72h hold.    Vitals:   Patient Vitals for the past 24 hrs:   BP Temp Temp src Pulse Resp SpO2   12/12/20 2138 (!) 147/84 97.6  F (36.4  C) Oral 112 18 98 %       Received Sign-out Plan:    Pending studies include: none      Plan:   - monitor until inpatient behavioral bed is ready    Events after assuming care:  No acute events overnight. Patient signed out to oncoming provider with plan for continued monitoring until inpatient room is available.      --  Isaac Marie MD   Emergency Medicine       Isaac Marie MD  12/13/20 0604

## 2020-12-13 NOTE — PHARMACY-ADMISSION MEDICATION HISTORY
Admission Medication History Completed by Pharmacy    See [facility] Admission Navigator for allergy information, preferred outpatient pharmacy, prior to admission medications and immunization status.     Medication History Sources:     Patient     Changes made to PTA medication list (reason):    Added: None    Deleted: propranolol (per  Patient, provider discontinued in November)    Changed: None    Additional Information:    Fluphenazine: Patient was unsure of when her last dose was but states it was within the last 2 weeks. She self administers medication and believes that her next dose is due on 12/16    Lisdexamfetamine: last got 30 capsules on 11/25 for 30 day supply.    Vancomycin: has prescription for capsules for positive c. Diff culture. Per telephone note on 12/11, patient should take if she starts to have watery stools.     Prior to Admission medications    Medication Sig Last Dose Taking? Auth Provider   ALMACONE -400-40 MG/5ML SUSP suspension TAKE 30MLS BY MOUTH EVERY 4 HOURS AS NEEDED FOR INDIGESTION 12/12/2020 at pm Yes Becki Avery APRN CNP   fluPHENAZine decanoate (PROLIXIN) 25 MG/ML injection INJECT 1ML (25MG) INTRAMUSCULARLY EVERY 14 DAYS Past Month at n/a Yes Ashlyn Fields APRN CNP   gabapentin (NEURONTIN) 300 MG capsule TAKE 3 CAPSULES (900MG) AND TAKE 4 CAPSULES (1200MG ) BY MOUTH MIDDAY DAY AND TAKE 3 CAPSULES (900MG) BY MOUTH EVERY EVENING 12/12/2020 at pm Yes Becki Avery APRN CNP   ibuprofen (ADVIL/MOTRIN) 800 MG tablet Take 1 tablet (800 mg) by mouth every 8 hours as needed for moderate pain, fever or pain Take with food 12/12/2020 at pm Yes Becki Avery APRN CNP   lisdexamfetamine (VYVANSE) 40 MG capsule Take 40 mg by mouth every morning  12/12/2020 at am Yes Reported, Patient   lithium ER (LITHOBID) 300 MG CR tablet Take one tab (1) each morning and three (3) tabs at bedtime 12/12/2020 at pm Yes Ashlyn Fields APRN CNP   norelgestromin-ethinyl  estradiol (XULANE) 150-35 MCG/24HR patch Place 1 patch onto the skin once a week May use continuously 12/7/2020 at n/a Yes Becki Avery APRN CNP   omeprazole (PRILOSEC) 20 MG DR capsule Take 1 capsule (20 mg) by mouth daily 12/12/2020 at am Yes Becki Avery APRN CNP   ondansetron (ZOFRAN-ODT) 4 MG ODT tab Take 1 tablet (4 mg) by mouth every 8 hours as needed for nausea 12/12/2020 at n/a Yes Katiuska Viramontes PA-C   QUEtiapine (SEROQUEL) 100 MG tablet Take 1 tablet (100 mg) by mouth At Bedtime 12/12/2020 at pm Yes Ashlyn Fields APRN CNP   vancomycin (VANCOCIN) 125 MG capsule Take 1 capsule (125 mg) by mouth 4 times daily has not started  Kacey Godfrey APRN CNP       Date completed: 12/13/20    Medication history completed by: Kayla Connors

## 2020-12-13 NOTE — ED NOTES
Bed: HW02  Expected date:   Expected time:   Means of arrival:   Comments:  H434   30F  Psyche Eval
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
## 2020-12-13 NOTE — ED TRIAGE NOTES
Per EMS, patient was picked up from a group home at [facility], having auditory hallucinations and suicidal thoughts, happens when she has a lot of stress, schizoaffective disorder, med compliant, anxious, bg 118, slight hypertensive and tachycardic, has c.diff

## 2020-12-13 NOTE — ED NOTES
Patient has been given copies of her 72 hour hold and patient's rights forms.  She did not have any questions.
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
## 2020-12-14 ENCOUNTER — VIRTUAL VISIT (OUTPATIENT)
Dept: GASTROENTEROLOGY | Facility: CLINIC | Age: 30
End: 2020-12-14
Payer: COMMERCIAL

## 2020-12-14 VITALS — WEIGHT: 230 LBS | BODY MASS INDEX: 36.96 KG/M2 | HEIGHT: 66 IN

## 2020-12-14 DIAGNOSIS — Z53.9 ERRONEOUS ENCOUNTER--DISREGARD: Primary | ICD-10-CM

## 2020-12-14 LAB
ANION GAP SERPL CALCULATED.3IONS-SCNC: 5 MMOL/L (ref 3–14)
BUN SERPL-MCNC: 13 MG/DL (ref 7–30)
CALCIUM SERPL-MCNC: 9.2 MG/DL (ref 8.5–10.1)
CHLORIDE SERPL-SCNC: 113 MMOL/L (ref 94–109)
CO2 SERPL-SCNC: 22 MMOL/L (ref 20–32)
CREAT SERPL-MCNC: 0.65 MG/DL (ref 0.52–1.04)
ERYTHROCYTE [DISTWIDTH] IN BLOOD BY AUTOMATED COUNT: 14.6 % (ref 10–15)
GFR SERPL CREATININE-BSD FRML MDRD: >90 ML/MIN/{1.73_M2}
GLUCOSE SERPL-MCNC: 95 MG/DL (ref 70–99)
HCT VFR BLD AUTO: 40.3 % (ref 35–47)
HGB BLD-MCNC: 12.7 G/DL (ref 11.7–15.7)
MCH RBC QN AUTO: 27.7 PG (ref 26.5–33)
MCHC RBC AUTO-ENTMCNC: 31.5 G/DL (ref 31.5–36.5)
MCV RBC AUTO: 88 FL (ref 78–100)
PLATELET # BLD AUTO: 362 10E9/L (ref 150–450)
POTASSIUM SERPL-SCNC: 4.4 MMOL/L (ref 3.4–5.3)
RBC # BLD AUTO: 4.59 10E12/L (ref 3.8–5.2)
SODIUM SERPL-SCNC: 140 MMOL/L (ref 133–144)
WBC # BLD AUTO: 12.2 10E9/L (ref 4–11)

## 2020-12-14 PROCEDURE — 120N000002 HC R&B MED SURG/OB UMMC

## 2020-12-14 PROCEDURE — 250N000013 HC RX MED GY IP 250 OP 250 PS 637: Performed by: HOSPITALIST

## 2020-12-14 PROCEDURE — 36415 COLL VENOUS BLD VENIPUNCTURE: CPT | Performed by: HOSPITALIST

## 2020-12-14 PROCEDURE — 99222 1ST HOSP IP/OBS MODERATE 55: CPT | Performed by: PSYCHIATRY & NEUROLOGY

## 2020-12-14 PROCEDURE — 250N000011 HC RX IP 250 OP 636: Performed by: HOSPITALIST

## 2020-12-14 PROCEDURE — 80048 BASIC METABOLIC PNL TOTAL CA: CPT | Performed by: HOSPITALIST

## 2020-12-14 PROCEDURE — 85027 COMPLETE CBC AUTOMATED: CPT | Performed by: HOSPITALIST

## 2020-12-14 PROCEDURE — 99232 SBSQ HOSP IP/OBS MODERATE 35: CPT | Performed by: HOSPITALIST

## 2020-12-14 PROCEDURE — 250N000013 HC RX MED GY IP 250 OP 250 PS 637: Performed by: EMERGENCY MEDICINE

## 2020-12-14 PROCEDURE — 250N000013 HC RX MED GY IP 250 OP 250 PS 637: Performed by: PSYCHIATRY & NEUROLOGY

## 2020-12-14 PROCEDURE — 99207 PR CDG-CODE CATEGORY CHANGED: CPT | Performed by: PSYCHIATRY & NEUROLOGY

## 2020-12-14 RX ORDER — CYPROHEPTADINE HYDROCHLORIDE 4 MG/1
4 TABLET ORAL AT BEDTIME
Status: DISCONTINUED | OUTPATIENT
Start: 2020-12-14 | End: 2020-12-19 | Stop reason: HOSPADM

## 2020-12-14 RX ORDER — OLANZAPINE 2.5 MG/1
2.5 TABLET, FILM COATED ORAL 3 TIMES DAILY PRN
Status: DISCONTINUED | OUTPATIENT
Start: 2020-12-14 | End: 2020-12-19 | Stop reason: HOSPADM

## 2020-12-14 RX ADMIN — NICOTINE POLACRILEX 2 MG: 2 GUM, CHEWING ORAL at 20:17

## 2020-12-14 RX ADMIN — POTASSIUM CHLORIDE AND SODIUM CHLORIDE: 900; 150 INJECTION, SOLUTION INTRAVENOUS at 03:51

## 2020-12-14 RX ADMIN — GABAPENTIN 1200 MG: 400 CAPSULE ORAL at 12:08

## 2020-12-14 RX ADMIN — VANCOMYCIN HYDROCHLORIDE 125 MG: 125 CAPSULE ORAL at 16:39

## 2020-12-14 RX ADMIN — LITHIUM CARBONATE 300 MG: 300 TABLET, EXTENDED RELEASE ORAL at 09:28

## 2020-12-14 RX ADMIN — PROPRANOLOL HYDROCHLORIDE 20 MG: 20 TABLET ORAL at 09:30

## 2020-12-14 RX ADMIN — LITHIUM CARBONATE 900 MG: 450 TABLET, EXTENDED RELEASE ORAL at 22:25

## 2020-12-14 RX ADMIN — OLANZAPINE 2.5 MG: 2.5 TABLET, FILM COATED ORAL at 19:10

## 2020-12-14 RX ADMIN — NORELGESTROMIN AND ETHINYL ESTRADIOL 1 PATCH: 150; 35 PATCH TRANSDERMAL at 10:19

## 2020-12-14 RX ADMIN — ALUMINUM HYDROXIDE, MAGNESIUM HYDROXIDE, AND SIMETHICONE 30 ML: 200; 200; 20 SUSPENSION ORAL at 14:28

## 2020-12-14 RX ADMIN — VANCOMYCIN HYDROCHLORIDE 125 MG: 125 CAPSULE ORAL at 20:17

## 2020-12-14 RX ADMIN — CYPROHEPTADINE HYDROCHLORIDE 4 MG: 4 TABLET ORAL at 22:25

## 2020-12-14 RX ADMIN — NICOTINE POLACRILEX 2 MG: 2 GUM, CHEWING ORAL at 10:19

## 2020-12-14 RX ADMIN — NICOTINE POLACRILEX 2 MG: 2 GUM, CHEWING ORAL at 18:01

## 2020-12-14 RX ADMIN — GABAPENTIN 900 MG: 300 CAPSULE ORAL at 20:17

## 2020-12-14 RX ADMIN — NICOTINE 1 PATCH: 14 PATCH, EXTENDED RELEASE TRANSDERMAL at 09:30

## 2020-12-14 RX ADMIN — OMEPRAZOLE 20 MG: 20 CAPSULE, DELAYED RELEASE ORAL at 09:28

## 2020-12-14 RX ADMIN — ONDANSETRON 4 MG: 4 TABLET, ORALLY DISINTEGRATING ORAL at 12:08

## 2020-12-14 RX ADMIN — GABAPENTIN 900 MG: 300 CAPSULE ORAL at 09:28

## 2020-12-14 RX ADMIN — ENOXAPARIN SODIUM 40 MG: 40 INJECTION SUBCUTANEOUS at 14:24

## 2020-12-14 RX ADMIN — VANCOMYCIN HYDROCHLORIDE 125 MG: 125 CAPSULE ORAL at 12:08

## 2020-12-14 RX ADMIN — QUETIAPINE FUMARATE 100 MG: 100 TABLET ORAL at 22:25

## 2020-12-14 RX ADMIN — VANCOMYCIN HYDROCHLORIDE 125 MG: 125 CAPSULE ORAL at 09:28

## 2020-12-14 RX ADMIN — LISDEXAMFETAMINE DIMESYLATE 40 MG: 20 CAPSULE ORAL at 09:27

## 2020-12-14 RX ADMIN — ACETAMINOPHEN 650 MG: 325 TABLET, FILM COATED ORAL at 18:01

## 2020-12-14 ASSESSMENT — MIFFLIN-ST. JEOR: SCORE: 1780.02

## 2020-12-14 ASSESSMENT — PAIN SCALES - GENERAL: PAINLEVEL: MODERATE PAIN (5)

## 2020-12-14 NOTE — PROGRESS NOTES
Patient currently admitted to the hospital, discussed that I can follow-up with her once she is discharged. I will message our clinic coordinators to get in contact with her.     Katiuska Viramontes PA-C  Division of Gastroenterology, Hepatology & Nutrition  [facility]       This encounter was opened in error. Please disregard.

## 2020-12-14 NOTE — NURSING NOTE
"Chief Complaint   Patient presents with     RECHECK     Virtual follow up       Vitals:    12/14/20 1321   Weight: 104.3 kg (230 lb)   Height: 1.676 m (5' 6\")       Body mass index is 37.12 kg/m .      SOURAV Brown NREMT                      "

## 2020-12-14 NOTE — H&P
[facility]     History and Physical  Hospitalist       Date of Admission:  12/12/2020  Date of Service (when I saw the patient): 12/13/20    Assessment & Plan       # C diff colitis: 6-10 stools per day. No abdominal pain. Had some vomiting. WBC 14.8.   -Start Oral Vancomycin  -IVF, low fiber diet.   # ADHD, PTSD, Bipolar 1 disorder, schizoaffective disorder, personality disorder:   # Suicide Ideations: She was supposed to be admitted to psychiatry, but ended up coming here due to c diff.  She is on 72 hr hold per orders from ED.   -Sitter, Suicide precautions  -Resume home medications for now.   -Psychiatry consult, recs per them  # UDS positive for amphetamine. Likely from her prescription medication(lisdexamfetamine)    DVT Prophylaxis: Low Risk/Ambulatory with no VTE prophylaxis indicated  Code Status: Full Code    Disposition: TBD.    Rc Naidu MD    Primary Care Physician   Becki Avery    Chief Complaint   SI, Anxiety, not feeling safe, Diarrhea      History of Present Illness      Ayana Prasad is a 30 year old female with history of ADHD, bipolar 1 disorder, depression, schizoaffective disorder, personality disorder, polysubstance abuse, prior TBI, PTSD, and multiple drug overdoses who presents with worsening command auditory hallucinations.  She is on civil commitment until February 2021.  Patient has been residing at [facility] with medication management and staff members for guidance.  This is a loosely structured residence issues passes for the residents to leave.  Passes are for 2 hours but patient has been leaving for 3-4 hours and not informing staff of her whereabouts.  There is concerned that she has been using illicit substances though her urine drug screens have been negative. Today she felt suicidal and unsafe. She brought down a chord and told the staff that she will hand herself with that. She has increased command hallucinations and  anxiety. She has hx trying to commit suicide by jumping out of the [place] [address] and other time with drug overdose.     She recently was not to have diarrhea and has C diff positive. Reports being trated with Ab for ear infection a a month ago. She report 6-10 stools per day. She has felt upset stomach and has vomited few times. No blood in stool or vomitus. No fevers. No cramps in the abdomen. She was prescribed oral vancomycin, but she has not taken that yet.     She was being admitted to psychiatry, but given  Positive  c diff, she is being now admitted to medicine.     Past Medical History    I have reviewed this patient's medical history and updated it with pertinent information if needed.   Past Medical History:   Diagnosis Date     ADHD (attention deficit hyperactivity disorder)      Bipolar 1 disorder      Borderline personality disorder      Cauda equina syndrome      Chronic low back pain      Depression      Depressive disorder      GERD (gastroesophageal reflux disease)      h/o TBI (traumatic brain injury)      Marginal corneal ulcer, left 7/17/2015     Nephrolithiasis      Polysubstance abuse - methamphetamine, hallucinagen, heroin, marijuana     currently in remission     PONV (postoperative nausea and vomiting)      PTSD (post-traumatic stress disorder)        Past Surgical History   I have reviewed this patient's surgical history and updated it with pertinent information if needed.  Past Surgical History:   Procedure Laterality Date     BACK SURGERY - For Cauda Equina Syndrome  12/24/2016     COLONOSCOPY       COMBINED CYSTOSCOPY, INSERT STENT URETER(S) Left 8/30/2018    Procedure: COMBINED CYSTOSCOPY, INSERT STENT URETER(S);  Cystoscopy With Left Stent Placement;  Surgeon: Kiran Ulrich MD;  Location: [facility]     COMBINED CYSTOSCOPY, RETROGRADES, EXCHANGE STENT URETER(S) Left 10/14/2018    Procedure: COMBINED CYSTOSCOPY, RETROGRADES, EXCHANGE STENT URETER(S);  Cystoscopy and removal of  left-sided stent.;  Surgeon: Stiven Olivo MD;  Location:  OR     COMBINED CYSTOSCOPY, RETROGRADES, URETEROSCOPY, INSERT STENT  1/6/2014    Procedure: COMBINED CYSTOSCOPY, RETROGRADES, URETEROSCOPY, INSERT STENT;  Cystyoscopy place left ureteral stent;  Surgeon: Jaun Kimble MD;  Location: [facility] OR     COMBINED CYSTOSCOPY, RETROGRADES, URETEROSCOPY, INSERT STENT Left 10/23/2018    Procedure: Video cystoscopy, left ureteroscopy with stone extraction;  Surgeon: Bull Mast MD;  Location:  OR     CYSTOSCOPY, URETEROSCOPY, COMBINED Right 9/23/2015    Procedure: COMBINED CYSTOSCOPY, URETEROSCOPY;  Surgeon: ROME Jett MD;  Location: [facility] OR     ENT SURGERY       ESOPHAGOSCOPY, GASTROSCOPY, DUODENOSCOPY (EGD), COMBINED  4/8/2013    Procedure: COMBINED ESOPHAGOSCOPY, GASTROSCOPY, DUODENOSCOPY (EGD), BIOPSY SINGLE OR MULTIPLE;  Gastroscopy;  Surgeon: Peter Pickard MD;  Location: [facility]     ESOPHAGOSCOPY, GASTROSCOPY, DUODENOSCOPY (EGD), COMBINED Left 10/28/2014    Procedure: COMBINED ESOPHAGOSCOPY, GASTROSCOPY, DUODENOSCOPY (EGD), BIOPSY SINGLE OR MULTIPLE;  Surgeon: Narcisa Ramirez MD;  Location:  OR     ESOPHAGOSCOPY, GASTROSCOPY, DUODENOSCOPY (EGD), COMBINED N/A 12/24/2018    Procedure: COMBINED ESOPHAGOSCOPY, GASTROSCOPY, DUODENOSCOPY (EGD), BIOPSY SINGLE OR MULTIPLE;  Surgeon: Sonu Verduzco MD;  Location: [facility]     LAPAROSCOPIC CHOLECYSTECTOMY  11/20/2014    [facility], Dr. Ramirez     LASER HOLMIUM LITHOTRIPSY URETER(S), INSERT STENT, COMBINED  4/2/2014    Procedure: COMBINED CYSTOSCOPY, URETEROSCOPY, LASER HOLMIUM LITHOTRIPSY URETER(S), INSERT STENT;  Cystoscopy,Left Ureteral Stent Removal,Left Ureteroscopy with Laser Lithotripsy,Left Ureter Stent Placement;  Surgeon: ROME Jett MD;  Location: [facility] OR     Transurethral stone resection  03/11/2011       Prior to Admission Medications   Prior to Admission Medications   Prescriptions Last Dose Informant Patient Reported?  Taking?   ALMACONE -400-40 MG/5ML SUSP suspension 12/12/2020 at pm  No Yes   Sig: TAKE 30MLS BY MOUTH EVERY 4 HOURS AS NEEDED FOR INDIGESTION   QUEtiapine (SEROQUEL) 100 MG tablet 12/12/2020 at pm  No Yes   Sig: Take 1 tablet (100 mg) by mouth At Bedtime   fluPHENAZine decanoate (PROLIXIN) 25 MG/ML injection Past Month at n/a  No Yes   Sig: INJECT 1ML (25MG) INTRAMUSCULARLY EVERY 14 DAYS   gabapentin (NEURONTIN) 300 MG capsule 12/12/2020 at pm  No Yes   Sig: TAKE 3 CAPSULES (900MG) AND TAKE 4 CAPSULES (1200MG ) BY MOUTH MIDDAY DAY AND TAKE 3 CAPSULES (900MG) BY MOUTH EVERY EVENING   ibuprofen (ADVIL/MOTRIN) 800 MG tablet 12/12/2020 at pm  No Yes   Sig: Take 1 tablet (800 mg) by mouth every 8 hours as needed for moderate pain, fever or pain Take with food   lisdexamfetamine (VYVANSE) 40 MG capsule 12/12/2020 at am  Yes Yes   Sig: Take 40 mg by mouth every morning    lithium ER (LITHOBID) 300 MG CR tablet 12/12/2020 at pm  No Yes   Sig: Take one tab (1) each morning and three (3) tabs at bedtime   norelgestromin-ethinyl estradiol (XULANE) 150-35 MCG/24HR patch 12/7/2020 at n/a  No Yes   Sig: Place 1 patch onto the skin once a week May use continuously   omeprazole (PRILOSEC) 20 MG DR capsule 12/12/2020 at am  No Yes   Sig: Take 1 capsule (20 mg) by mouth daily   ondansetron (ZOFRAN-ODT) 4 MG ODT tab 12/12/2020 at n/a  No Yes   Sig: Take 1 tablet (4 mg) by mouth every 8 hours as needed for nausea   vancomycin (VANCOCIN) 125 MG capsule has not started  No No   Sig: Take 1 capsule (125 mg) by mouth 4 times daily      Facility-Administered Medications: None     Allergies   Allergies   Allergen Reactions     Haldol [Haloperidol] Other (See Comments)     Makes patient very angry and anxious     Adhesive Tape Hives     Percocet [Oxycodone-Acetaminophen] Nausea and Vomiting     Prednisone Other (See Comments) and Hives     Suicidal ideation     Risperidone Other (See Comments)     Tramadol Hcl Nausea and Vomiting      Droperidol Anxiety     Seroquel [Quetiapine] Palpitations     Spent 2 weeks in the hospital due to having seroquel, caused palpitations and QT prolongation       Social History   I have reviewed this patient's social history and updated it with pertinent information if needed. Ayana Prasad  reports that she has been smoking dip, chew, snus or snuff and other. She started smoking about 9 years ago. She has a 5.00 pack-year smoking history. Her smokeless tobacco use includes chew. She reports that she does not drink alcohol or use drugs.    Family History   I have reviewed this patient's family history and updated it with pertinent information if needed.   Family History   Problem Relation Age of Onset     Gastrointestinal Disease Father         Crohn's Disease     Cancer Father         Liver Cancer     Other Cancer Father         Liver     Cancer Maternal Grandmother         lympoma     Diabetes Maternal Grandmother      Mental Illness Maternal Grandmother      Other Cancer Maternal Grandmother         Non Hodgkins Lymphoma     Diabetes Maternal Grandfather      Hypertension Maternal Grandfather      Prostate Cancer Maternal Grandfather      Hyperlipidemia Maternal Grandfather      Heart Disease Paternal Grandfather         heart disease     Hypertension Brother      Other Cancer Brother         Esophagecial     Diabetes Brother      Hyperlipidemia Mother      Mental Illness Mother      Anxiety Disorder Mother      Anesthesia Reaction Mother         Vomiting/Nausea     Hypertension Brother      Other Cancer Brother      Other Cancer Paternal Half-Brother         Esophageal       Review of Systems   The 10 point Review of Systems is negative other than noted in the HPI or here.     Physical Exam   Temp: 98.6  F (37  C) Temp src: Oral BP: (!) 142/87 Pulse: 107   Resp: 16 SpO2: 98 % O2 Device: None (Room air)    Vital Signs with Ranges  Temp:  [97.6  F (36.4  C)-98.6  F (37  C)] 98.6  F (37  C)  Pulse:  []  107  Resp:  [16-20] 16  BP: (125-147)/(84-87) 142/87  SpO2:  [94 %-98 %] 98 %  0 lbs 0 oz    Constitutional: Awake, alert, cooperative, no apparent distress.  Eyes: Conjunctiva and pupils examined and normal.  HEENT: Moist mucous membranes  Respiratory: Clear to auscultation bilaterally, no crackles or wheezing.  Cardiovascular: Regular rate and rhythm, normal S1 and S2, and no murmur noted.  GI: Soft, non-distended, non-tender, normal bowel sounds.  Lymph/Hematologic: No anterior cervical or supraclavicular adenopathy.  Skin: No rashes, no cyanosis, no edema.  Musculoskeletal: No joint swelling, erythema or tenderness.  Neurologic: Cranial nerves 2-12 intact, normal strength and sensation.  Psychiatric: Looks anxious      Data   Data reviewed today:      EKG: none  Imaging: none  COVID 19 negative.   UDS positive for Amphetamine    Recent Labs   Lab 12/10/20  1527   WBC 14.8*   HGB 12.6   MCV 87         POTASSIUM 4.0   CHLORIDE 109   CO2 27   BUN 14   CR 0.79   ANIONGAP 2*   WILLIAMS 9.8   *   ALBUMIN 3.2*   PROTTOTAL 7.9   BILITOTAL 0.1*   ALKPHOS 52   ALT 19   AST 8   LIPASE 171       No results found for this or any previous visit (from the past 24 hour(s)).

## 2020-12-14 NOTE — LETTER
12/14/2020         RE: Ayana Prasad  [address] [id number]        Dear Colleague,    Thank you for referring your patient, Ayana Prasad, to the [facility] [place]. Please see a copy of my visit note below.    Patient currently admitted to the hospital, discussed that I can follow-up with her once she is discharged. I will message our clinic coordinators to get in contact with her.     Katiuska Viramontes PA-C  Division of Gastroenterology, Hepatology & Nutrition  [facility]       This encounter was opened in error. Please disregard.      Again, thank you for allowing me to participate in the care of your patient.        Sincerely,        Katiuska Viramontes PA-C

## 2020-12-14 NOTE — CONSULTS
"          Initial Psychiatric Consult   Consult date: December 14, 2020         Reason for Consult, requesting source:    Anxiety and SI  Requesting source: Dr Naidu         HPI:   From admission note 12/13:   \"Ayana Prasad is a 30 year old female with history of ADHD, bipolar 1 disorder, depression, schizoaffective disorder, personality disorder, polysubstance abuse, prior TBI, PTSD, and multiple drug overdoses who presents with worsening command auditory hallucinations.  She is on civil commitment until February 2021.  Patient has been residing at [facility] with medication management and staff members for guidance.  This is a loosely structured residence issues passes for the residents to leave.  Passes are for 2 hours but patient has been leaving for 3-4 hours and not informing staff of her whereabouts.  There is concerned that she has been using illicit substances though her urine drug screens have been negative. Today she felt suicidal and unsafe. She brought down a chord and told the staff that she will hand herself with that. She has increased command hallucinations and anxiety. She has hx trying to commit suicide by jumping out of the car on [address] and other time with drug overdose.\"    She said that the voices, increased anxiety and suicidal thoughts brought on by increased stressors.  She is having difficulty living at her current place to develop people, and is looking forward to a placement in a smaller group.  Today her mood now is quite stable and the suicidal thoughts and severe anxiety resolved after she received 10 mg of Zyprexa in the emergency department.  She continues to have PTSD related nightmares 3-4 times per week.  Current ADHD symptoms were not reviewed.            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  2013: overdose, auditory hallucinations   2013: polysubstance overdose  2012: alcohol abuse     Suicide attempts: multiple, patient states that she once attempted to overdose \"4 times in 3 days\"   Self-injurious behavior: denies   ECT/TMS: none   Past medications:   - Lunesta, Ambien, Prazosin, Mirtazapine   - Zyprexa (\"didn't work\"), Risperidone, Seroquel (QT prolongation), Abilify, , Lamictal, Trifluoperazine, Geodon,   - Cymbalta, Fluoxetine,   - Lamictal  - Guanfacine  - Straterra, Concerta  - Hydroxyzine (worsening urinary retention)    Used to be seen by psychiatry at [facility], now with [facility] for meds and psychotherapy          Substance Use and History:   Used to use IV heroin was snorting methamphetamine, but she for the most part stopped using after treatment in 2015.  Had a brief relapse into meth use about 60 days ago. She is now active in NA and AA.   Uses tobacco         Past Medical History:   PAST MEDICAL HISTORY:   Past Medical History:   Diagnosis Date     ADHD (attention deficit hyperactivity disorder)      Bipolar 1 disorder      Borderline personality disorder      Cauda equina syndrome      Chronic low back pain      Depression      Depressive disorder      GERD (gastroesophageal reflux disease)      h/o TBI (traumatic brain injury)      Marginal corneal ulcer, left 7/17/2015     Nephrolithiasis      Polysubstance abuse - methamphetamine, hallucinagen, heroin, marijuana     currently in remission     PONV (postoperative nausea and vomiting)      PTSD (post-traumatic stress disorder)        PAST SURGICAL HISTORY:   Past Surgical History:   Procedure Laterality Date     BACK SURGERY - For Cauda Equina Syndrome  12/24/2016     COLONOSCOPY       COMBINED CYSTOSCOPY, INSERT STENT URETER(S) Left 8/30/2018    Procedure: " COMBINED CYSTOSCOPY, INSERT STENT URETER(S);  Cystoscopy With Left Stent Placement;  Surgeon: Kiran Ulrich MD;  Location: [facility]     COMBINED CYSTOSCOPY, RETROGRADES, EXCHANGE STENT URETER(S) Left 10/14/2018    Procedure: COMBINED CYSTOSCOPY, RETROGRADES, EXCHANGE STENT URETER(S);  Cystoscopy and removal of left-sided stent.;  Surgeon: Stiven Olivo MD;  Location:  OR     COMBINED CYSTOSCOPY, RETROGRADES, URETEROSCOPY, INSERT STENT  1/6/2014    Procedure: COMBINED CYSTOSCOPY, RETROGRADES, URETEROSCOPY, INSERT STENT;  Cystyoscopy place left ureteral stent;  Surgeon: Jaun Kimble MD;  Location: [facility]     COMBINED CYSTOSCOPY, RETROGRADES, URETEROSCOPY, INSERT STENT Left 10/23/2018    Procedure: Video cystoscopy, left ureteroscopy with stone extraction;  Surgeon: Bull Mast MD;  Location:  OR     CYSTOSCOPY, URETEROSCOPY, COMBINED Right 9/23/2015    Procedure: COMBINED CYSTOSCOPY, URETEROSCOPY;  Surgeon: ROME Jett MD;  Location: [facility]     ENT SURGERY       ESOPHAGOSCOPY, GASTROSCOPY, DUODENOSCOPY (EGD), COMBINED  4/8/2013    Procedure: COMBINED ESOPHAGOSCOPY, GASTROSCOPY, DUODENOSCOPY (EGD), BIOPSY SINGLE OR MULTIPLE;  Gastroscopy;  Surgeon: Peter Pickard MD;  Location: [facility]     ESOPHAGOSCOPY, GASTROSCOPY, DUODENOSCOPY (EGD), COMBINED Left 10/28/2014    Procedure: COMBINED ESOPHAGOSCOPY, GASTROSCOPY, DUODENOSCOPY (EGD), BIOPSY SINGLE OR MULTIPLE;  Surgeon: Narcisa Ramirez MD;  Location:  OR     ESOPHAGOSCOPY, GASTROSCOPY, DUODENOSCOPY (EGD), COMBINED N/A 12/24/2018    Procedure: COMBINED ESOPHAGOSCOPY, GASTROSCOPY, DUODENOSCOPY (EGD), BIOPSY SINGLE OR MULTIPLE;  Surgeon: Sonu Verduzco MD;  Location: [facility]     LAPAROSCOPIC CHOLECYSTECTOMY  11/20/2014    [facility], Dr. Ramirez     LASER HOLMIUM LITHOTRIPSY URETER(S), INSERT STENT, COMBINED  4/2/2014    Procedure: COMBINED CYSTOSCOPY, URETEROSCOPY, LASER HOLMIUM LITHOTRIPSY URETER(S), INSERT STENT;   Cystoscopy,Left Ureteral Stent Removal,Left Ureteroscopy with Laser Lithotripsy,Left Ureter Stent Placement;  Surgeon: ROME Jett MD;  Location: [facility]     Transurethral stone resection  03/11/2011             Family History:   FAMILY HISTORY:   Family History   Problem Relation Age of Onset     Gastrointestinal Disease Father         Crohn's Disease     Cancer Father         Liver Cancer     Other Cancer Father         Liver     Cancer Maternal Grandmother         lympoma     Diabetes Maternal Grandmother      Mental Illness Maternal Grandmother      Other Cancer Maternal Grandmother         Non Hodgkins Lymphoma     Diabetes Maternal Grandfather      Hypertension Maternal Grandfather      Prostate Cancer Maternal Grandfather      Hyperlipidemia Maternal Grandfather      Heart Disease Paternal Grandfather         heart disease     Hypertension Brother      Other Cancer Brother         Esophagecial     Diabetes Brother      Hyperlipidemia Mother      Mental Illness Mother      Anxiety Disorder Mother      Anesthesia Reaction Mother         Vomiting/Nausea     Hypertension Brother      Other Cancer Brother      Other Cancer Paternal Half-Brother         Esophageal           Social History:   SOCIAL HISTORY:   Social History     Tobacco Use     Smoking status: Current Every Day Smoker     Packs/day: 1.00     Years: 5.00     Pack years: 5.00     Types: Dip, chew, snus or snuff, Other     Start date: 8/1/2011     Smokeless tobacco: Current User     Types: Chew     Last attempt to quit: 12/17/2019   Substance Use Topics     Alcohol use: No     Alcohol/week: 0.0 standard drinks     She grew up in [place] where her parents still reside.  She has [place] in the cities off and on for the past several years.  Very sporadic work history due to her mental illness.  She anticipates moving to a group home in the near future.         Physical ROS:   The patient endorsed some anxiety symptoms. The remainder of 10-point review of  systems was negative except as noted in HPI.           Medications:     Current Facility-Administered Medications   Medication     acetaminophen (TYLENOL) tablet 650 mg     alum & mag hydroxide-simethicone (MAALOX) suspension 30 mL     cyproheptadine (PERIACTIN) tablet 4 mg     enoxaparin ANTICOAGULANT (LOVENOX) injection 40 mg     gabapentin (NEURONTIN) capsule 1,200 mg     gabapentin (NEURONTIN) capsule 900 mg     lidocaine (LMX4) cream     lidocaine 1 % 0.1-1 mL     lisdexamfetamine (VYVANSE) capsule 40 mg     lithium (ESKALITH CR/LITHOBID) CR tablet 900 mg     lithium ER (LITHOBID) CR tablet 300 mg     melatonin tablet 1 mg     nicotine (NICODERM CQ) 14 MG/24HR 24 hr patch 1 patch     nicotine (NICORETTE) gum 2 mg     nicotine Patch in Place     norelgestromin-ethinyl estradiol (ORTHO EVRA) 150-35 MCG/24HR 1 patch     norelgestromin-ethynyl estradiol weekly (ORTHO EVRA) Patch in Place     OLANZapine (zyPREXA) tablet 2.5 mg     omeprazole (priLOSEC) CR capsule 20 mg     ondansetron (ZOFRAN-ODT) ODT tab 4 mg    Or     ondansetron (ZOFRAN) injection 4 mg     QUEtiapine (SEROquel) tablet 100 mg     sodium chloride (PF) 0.9% PF flush 3 mL     sodium chloride (PF) 0.9% PF flush 3 mL     vancomycin (VANCOCIN) capsule 125 mg     (this is the listing after my med changes as below)          Allergies:     Allergies   Allergen Reactions     Haldol [Haloperidol] Other (See Comments)     Makes patient very angry and anxious     Adhesive Tape Hives     Percocet [Oxycodone-Acetaminophen] Nausea and Vomiting     Prednisone Other (See Comments) and Hives     Suicidal ideation     Risperidone Other (See Comments)     Tramadol Hcl Nausea and Vomiting     Droperidol Anxiety     Seroquel [Quetiapine] Palpitations     Spent 2 weeks in the hospital due to having seroquel, caused palpitations and QT prolongation          Labs:     Recent Results (from the past 48 hour(s))   Drug abuse screen 6 urine (chem dep)    Collection Time:  12/13/20  3:02 AM   Result Value Ref Range    Amphetamine Qual Urine Positive (A) NEG^Negative    Barbiturates Qual Urine Negative NEG^Negative    Benzodiazepine Qual Urine Negative NEG^Negative    Cannabinoids Qual Urine Negative NEG^Negative    Cocaine Qual Urine Negative NEG^Negative    Ethanol Qual Urine Negative NEG^Negative    Opiates Qualitative Urine Negative NEG^Negative   HCG qualitative urine (UPT)    Collection Time: 12/13/20  3:02 AM   Result Value Ref Range    HCG Qual Urine Negative NEG^Negative   Asymptomatic Influenza A/B & SARS-CoV2 (COVID-19) Virus PCR Multiplex    Collection Time: 12/13/20  8:54 AM    Specimen: Nasopharyngeal   Result Value Ref Range    Flu A/B & SARS-COV-2 PCR Source Nasopharyngeal     SARS-CoV-2 PCR Result NEGATIVE     Influenza A PCR Negative NEG^Negative    Influenza B PCR Negative NEG^Negative    Respiratory Syncytial Virus PCR Negative NEG^Negative    Flu A/B & SARS-CoV-2 PCR Comment (Note)    Basic metabolic panel    Collection Time: 12/14/20  9:58 AM   Result Value Ref Range    Sodium 140 133 - 144 mmol/L    Potassium 4.4 3.4 - 5.3 mmol/L    Chloride 113 (H) 94 - 109 mmol/L    Carbon Dioxide 22 20 - 32 mmol/L    Anion Gap 5 3 - 14 mmol/L    Glucose 95 70 - 99 mg/dL    Urea Nitrogen 13 7 - 30 mg/dL    Creatinine 0.65 0.52 - 1.04 mg/dL    GFR Estimate >90 >60 mL/min/[1.73_m2]    GFR Estimate If Black >90 >60 mL/min/[1.73_m2]    Calcium 9.2 8.5 - 10.1 mg/dL   CBC with platelets    Collection Time: 12/14/20  9:58 AM   Result Value Ref Range    WBC 12.2 (H) 4.0 - 11.0 10e9/L    RBC Count 4.59 3.8 - 5.2 10e12/L    Hemoglobin 12.7 11.7 - 15.7 g/dL    Hematocrit 40.3 35.0 - 47.0 %    MCV 88 78 - 100 fl    MCH 27.7 26.5 - 33.0 pg    MCHC 31.5 31.5 - 36.5 g/dL    RDW 14.6 10.0 - 15.0 %    Platelet Count 362 150 - 450 10e9/L          Physical and Psychiatric Examination:     /61 (BP Location: Right arm)   Pulse 88   Temp 98.5  F (36.9  C) (Oral)   Resp 16   SpO2 96%  "  Weight is 0 lbs 0 oz  There is no height or weight on file to calculate BMI.    Physical Exam:  I have reviewed the physical exam as documented by by the medical team and agree with findings and assessment and have no additional findings to add at this time.    Mental Status Exam:  Sitting up in the hospital bed, is well groomed, pleasant, cooperative. Speech fluent. Moves upper extremities without difficulty. Has scars on arms from prior cutting.  Associations tight. Mood is \"fair.\"  Affect slightly restricted. Thought process logical, linear. Thought content negative for suicidal thoughts or delusions. Oriented x2 (not date).  Recent and remote memory, attention span and concentration are intact. Fund of knowledge, use of language appropriate. Insight and judgment fair.              DSM-5 Diagnosis:   # r/o Schizoaffective disorder, bipolar type, current episode depressive   # PTSD  # Polysubstance dependence (DSM IV), in early remission    # ADHD  # Borderline PD          Assessment:   She she received a PRN of 10 mg Zyprexa in the ED she has felt much better; decreased anxiety and voices. She now would feel comfortable without a sitter and will stay voluntarily.   She has been on Prazosin for PTSD, but taken off due to lack of efficacy.   Recent lithium level was therapeutic, so can continue current dose.   I don't like that she will need to go to IP psychiatry (best avoid with BPD), and may be ready for discharge tomorrow           Summary of Recommendations:   I have discontinued the sitter and suicide precautions, dropped the 72 hour hold.  Since she Inderal had been stopped as an outpatient I discontinued this  Zyprexa 2.5 mg TID PRN ordered (I prefer to not use 2 antipsychotics, but PRN Seroquel during the day is too sedating). She can be given a small amount on discharge  I ordered cyproheptadine 4 mg HS; may help reduce PTSD related nightmares   I would hold on Vyvanse during hospitalization   She will " "be following with [facility]   Page me at [phone] as needed.          \"This dictation was performed with voice recognition software and may contain errors,  omissions and inadvertent word substitution.\"           "

## 2020-12-14 NOTE — PROGRESS NOTES
[facility], [place], [place]  Internal Medicine Progress Note      Assessment and Plans:     See Admission HP for details leading to admission.     # C diff colitis: 6-10 stools per day PTA. No abdominal pain. Had some vomiting at home. WBC 14.8 on Admit. Some what better. NO vomiting. She diagnosed with C diff few days prior to admission but she had not yet started on Treatment before coming in.   -Ct on oral Vancomycin. Once she has formed stools, connect with infection prevention to see if we can take her off contact isolation. That will help in disposition.   -WBC improving   -Stop IVF  -Ct low fiber diet.   # ADHD, PTSD, Bipolar 1 disorder, schizoaffective disorder, personality disorder:   # Suicide Ideations:   # Increased Auditory hallucination:   She was supposed to be admitted to psychiatry, but ended up coming here due to c diff.  She is on 72 hr hold per orders from ED.   -Sitter, Suicide precautions  -Resume home medications for now.   -Psychiatry consult, recs per them  -Per notes she is under commitment from psychiatry standpoint.     # UDS positive for amphetamine: Likely from her prescription medication(lisdexamfetamine)     DVT Prophylaxis: Given she is on hormonal patches for birthcontrol, start lovenox for DVT prophyalaxis.   Code Status: Full Code     Disposition: If psychiatry feels she needs inpatient treatment, once c diff is better and she is taken off isolation, she can be discharged to inpatient psychiatry if she needs continued psychiatry hospitalization.       Rc Naidu MD  Text Page  (7am - 5pm, M-F)    Subjective:     Overnight Events reviewed, Chart Reviewed  She had two loose watery stools. Mild periumbilical burning sensation.   No severe abdominal pain. No vomiting.  No fevers.   Auditory hallucinations are better.      -Data reviewed today: I reviewed all new labs and imaging results over the last 24  hours.     Exam:       Temp: 96.2  F (35.7  C) Temp src: Oral BP: 116/73 Pulse: 84   Resp: 17 SpO2: 96 % O2 Device: None (Room air)    There were no vitals filed for this visit.  Vital Signs with Ranges  Temp:  [96.2  F (35.7  C)-99  F (37.2  C)] 96.2  F (35.7  C)  Pulse:  [] 84  Resp:  [16-18] 17  BP: (116-142)/(67-96) 116/73  SpO2:  [96 %-100 %] 96 %  I/O last 3 completed shifts:  In: 240 [P.O.:240]  Out: -     Constitutional: No distress noted, Alert, Answering questions appropriately  Respiratory: AE is goog on both sides, no wheezing onr crackles  Cardiovascular: S1S2 normal, no new murmur  GI: Soft, non tender  Skin/Integumen: No rash      Medications     0.9% sodium chloride + KCl 20 mEq/L 100 mL/hr at 12/14/20 0351       gabapentin  1,200 mg Oral Daily     gabapentin  900 mg Oral BID     lisdexamfetamine  40 mg Oral Daily     lithium ER  900 mg Oral At Bedtime     lithium ER  300 mg Oral Daily     nicotine  1 patch Transdermal Daily     nicotine   Transdermal Q8H     norelgestromin-ethinyl estradiol  1 patch Transdermal Weekly     norelgestromin-ethinyl estradiol   Transdermal Q8H     omeprazole  20 mg Oral Daily     propranolol  20 mg Oral TID     QUEtiapine  100 mg Oral At Bedtime     sodium chloride (PF)  3 mL Intracatheter Q8H     vancomycin  125 mg Oral 4x Daily       Data   Recent Labs   Lab 12/14/20  0958 12/10/20  1527   WBC 12.2* 14.8*   HGB 12.7 12.6   MCV 88 87    403    138   POTASSIUM 4.4 4.0   CHLORIDE 113* 109   CO2 22 27   BUN 13 14   CR 0.65 0.79   ANIONGAP 5 2*   WILLIAMS 9.2 9.8   GLC 95 100*   ALBUMIN  --  3.2*   PROTTOTAL  --  7.9   BILITOTAL  --  0.1*   ALKPHOS  --  52   ALT  --  19   AST  --  8   LIPASE  --  171       No results found for this or any previous visit (from the past 24 hour(s)).

## 2020-12-14 NOTE — PLAN OF CARE
VS: VSS   O2: >90% on RA   Output: Voiding adequately   Last BM: 4 watery BMs thus far   Activity: WBAT; up ind in room   Up for meals? Yes   Skin: CDI; tattoos; scars   Pain: Denies   CMS: Intact   Dressing: None   Diet: Regular   LDA: PIV infusing   Equipment: IV pole   Plan: TBD; possibly inpatient psych when c. Diff resolves   Additional Info: 1:1 DC'd at 1500.

## 2020-12-14 NOTE — PROGRESS NOTES
VS: /67 (BP Location: Right arm)   Pulse 90   Temp 98.8  F (37.1  C) (Oral)   Resp 18   SpO2 100% Denies shortness of breath, chest pain, lightheadedness, dizziness, pain and nausea and vomiting. Alert and oriented x 4.   O2: Room air sat. > 90%.    Output: Voiding adequate amount in bathroom without difficulty.   Last BM: 12/13/20. Passing flatus.   Activity: Ind. Steady at [place]alt.   Skin: Intact. Birth control patch on L shoulder, Nicotine  Patch on R shoulder.   Pain: Denies   CMS: CMS and neuro intact.Denies numbness and tingling.   Dressing: None   Diet:  Low fiber.   LDA: PIV infusing.   Equipment: IV pole, and personal belongings.   Plan: Continue with plan of care, call light within reach. Able to make needs known.   Additional Info:  Sitter at bedside. Denies SI.

## 2020-12-15 LAB
ANION GAP SERPL CALCULATED.3IONS-SCNC: 14 MMOL/L (ref 3–14)
BUN SERPL-MCNC: 14 MG/DL (ref 7–30)
CALCIUM SERPL-MCNC: 8.9 MG/DL (ref 8.5–10.1)
CHLORIDE SERPL-SCNC: 108 MMOL/L (ref 94–109)
CO2 SERPL-SCNC: 15 MMOL/L (ref 20–32)
CREAT SERPL-MCNC: 0.67 MG/DL (ref 0.52–1.04)
ERYTHROCYTE [DISTWIDTH] IN BLOOD BY AUTOMATED COUNT: 14 % (ref 10–15)
GFR SERPL CREATININE-BSD FRML MDRD: >90 ML/MIN/{1.73_M2}
GLUCOSE SERPL-MCNC: 131 MG/DL (ref 70–99)
HCT VFR BLD AUTO: 38.9 % (ref 35–47)
HGB BLD-MCNC: 12.8 G/DL (ref 11.7–15.7)
MAGNESIUM SERPL-MCNC: 2 MG/DL (ref 1.6–2.3)
MCH RBC QN AUTO: 28.1 PG (ref 26.5–33)
MCHC RBC AUTO-ENTMCNC: 32.9 G/DL (ref 31.5–36.5)
MCV RBC AUTO: 85 FL (ref 78–100)
PLATELET # BLD AUTO: 360 10E9/L (ref 150–450)
POTASSIUM SERPL-SCNC: 3.8 MMOL/L (ref 3.4–5.3)
RBC # BLD AUTO: 4.56 10E12/L (ref 3.8–5.2)
SODIUM SERPL-SCNC: 137 MMOL/L (ref 133–144)
WBC # BLD AUTO: 12.4 10E9/L (ref 4–11)

## 2020-12-15 PROCEDURE — 250N000013 HC RX MED GY IP 250 OP 250 PS 637: Performed by: PSYCHIATRY & NEUROLOGY

## 2020-12-15 PROCEDURE — 80048 BASIC METABOLIC PNL TOTAL CA: CPT | Performed by: HOSPITALIST

## 2020-12-15 PROCEDURE — 250N000013 HC RX MED GY IP 250 OP 250 PS 637: Performed by: HOSPITALIST

## 2020-12-15 PROCEDURE — 99207 PR CDG-MDM COMPONENT: MEETS MODERATE - UP CODED: CPT | Performed by: INTERNAL MEDICINE

## 2020-12-15 PROCEDURE — 250N000009 HC RX 250: Performed by: INTERNAL MEDICINE

## 2020-12-15 PROCEDURE — 250N000011 HC RX IP 250 OP 636: Performed by: HOSPITALIST

## 2020-12-15 PROCEDURE — 250N000013 HC RX MED GY IP 250 OP 250 PS 637: Performed by: EMERGENCY MEDICINE

## 2020-12-15 PROCEDURE — 85027 COMPLETE CBC AUTOMATED: CPT | Performed by: HOSPITALIST

## 2020-12-15 PROCEDURE — 250N000013 HC RX MED GY IP 250 OP 250 PS 637: Performed by: INTERNAL MEDICINE

## 2020-12-15 PROCEDURE — 99232 SBSQ HOSP IP/OBS MODERATE 35: CPT | Performed by: INTERNAL MEDICINE

## 2020-12-15 PROCEDURE — 36415 COLL VENOUS BLD VENIPUNCTURE: CPT | Performed by: HOSPITALIST

## 2020-12-15 PROCEDURE — 83735 ASSAY OF MAGNESIUM: CPT | Performed by: HOSPITALIST

## 2020-12-15 PROCEDURE — 120N000002 HC R&B MED SURG/OB UMMC

## 2020-12-15 RX ADMIN — LITHIUM CARBONATE 900 MG: 450 TABLET, EXTENDED RELEASE ORAL at 22:34

## 2020-12-15 RX ADMIN — OLANZAPINE 2.5 MG: 2.5 TABLET, FILM COATED ORAL at 13:32

## 2020-12-15 RX ADMIN — QUETIAPINE FUMARATE 100 MG: 100 TABLET ORAL at 22:34

## 2020-12-15 RX ADMIN — OMEPRAZOLE 20 MG: 20 CAPSULE, DELAYED RELEASE ORAL at 08:56

## 2020-12-15 RX ADMIN — LISDEXAMFETAMINE DIMESYLATE 40 MG: 20 CAPSULE ORAL at 08:56

## 2020-12-15 RX ADMIN — GABAPENTIN 900 MG: 300 CAPSULE ORAL at 08:55

## 2020-12-15 RX ADMIN — LITHIUM CARBONATE 300 MG: 300 TABLET, EXTENDED RELEASE ORAL at 08:56

## 2020-12-15 RX ADMIN — GABAPENTIN 900 MG: 300 CAPSULE ORAL at 20:22

## 2020-12-15 RX ADMIN — VANCOMYCIN HYDROCHLORIDE 125 MG: 125 CAPSULE ORAL at 12:01

## 2020-12-15 RX ADMIN — NICOTINE POLACRILEX 2 MG: 2 GUM, CHEWING ORAL at 20:22

## 2020-12-15 RX ADMIN — VANCOMYCIN HYDROCHLORIDE 125 MG: 125 CAPSULE ORAL at 08:56

## 2020-12-15 RX ADMIN — CYPROHEPTADINE HYDROCHLORIDE 4 MG: 4 TABLET ORAL at 22:34

## 2020-12-15 RX ADMIN — NICOTINE POLACRILEX 2 MG: 2 GUM, CHEWING ORAL at 12:01

## 2020-12-15 RX ADMIN — NICOTINE 1 PATCH: 14 PATCH, EXTENDED RELEASE TRANSDERMAL at 08:56

## 2020-12-15 RX ADMIN — VANCOMYCIN HYDROCHLORIDE 125 MG: 125 CAPSULE ORAL at 20:22

## 2020-12-15 RX ADMIN — ALUMINUM HYDROXIDE, MAGNESIUM HYDROXIDE, AND SIMETHICONE 30 ML: 200; 200; 20 SUSPENSION ORAL at 19:02

## 2020-12-15 RX ADMIN — LIDOCAINE HYDROCHLORIDE 30 ML: 20 SOLUTION ORAL; TOPICAL at 22:35

## 2020-12-15 RX ADMIN — VANCOMYCIN HYDROCHLORIDE 125 MG: 125 CAPSULE ORAL at 16:47

## 2020-12-15 RX ADMIN — ONDANSETRON 4 MG: 4 TABLET, ORALLY DISINTEGRATING ORAL at 15:02

## 2020-12-15 RX ADMIN — GABAPENTIN 1200 MG: 400 CAPSULE ORAL at 12:01

## 2020-12-15 RX ADMIN — ACETAMINOPHEN 650 MG: 325 TABLET, FILM COATED ORAL at 20:21

## 2020-12-15 RX ADMIN — ENOXAPARIN SODIUM 40 MG: 40 INJECTION SUBCUTANEOUS at 13:32

## 2020-12-15 NOTE — PLAN OF CARE
Vitals: /72 (BP Location: Right arm)   Pulse 79   Temp 97.6  F (36.4  C) (Oral)   Resp 16   SpO2 97%   Lung sounds CTA. Denies CP, SOB, nausea.   Output: Up at joshua. Continent b/b. Active C.Diff- loose, watery.  Last BM: 12/14   Activity: Up at joshua, independent in room.   Skin: Intact. Tattoos.   Pain: Denied pain this shift.   CMS/Neuro: Intact. A&O. Able to make needs known.   Dressing: None.   Diet: Low fiber. Adequate.    LDA: L hand PIV.   Equipment: belongings   Plan/Add'l info: Able to make needs known. Call light within reach. Continue with POC.  Discharge plan: 12/23. Pt wants to go back to previous living arrangement.

## 2020-12-15 NOTE — PLAN OF CARE
VS: VSS   Output: Voiding spontaneously, 1 loose BM this morning    Activity: Independent in room.   Skin: WDL ex scars/tattoos    Pain: Denies   Neuro/CMS: Intact. Endorses auditory hallucinations/voices talking to her but denies SI at this time.    Dressing(s): None   Diet: Tolerating regular diet    LDA: PIV L hand SL   Plan: TBD   Additional Info:    Denies SI  Nicorette gum given PRN  Nicotine patch in place L shoulder  Contraceptive patch in place L shoulder

## 2020-12-15 NOTE — PROGRESS NOTES
Patient was seen, course reviewed.  Psychiatry consultation appreciated.    Patient states she feels about the same.  She has mild abdominal cramps.  She had 6 loose stools yesterday.  She denies nausea or vomiting.  Spirits are okay.      Afebrile vital signs stable  Sleepy, lying on stomach, appears comfortable  CV regular rhythm  Abdomen soft protuberant nontender  No edema      Assessment    C. difficile colitis, ongoing diarrhea.  Abdominal exam benign.  Patient remains on vancomycin which was started on 12 December.    Leukocytosis, possibly secondary to C. difficile versus lithium    Acute affect disorder, bipolar type.  On lithium, as needed Zyprexa, at bedtime Seroquel, stable    Plan  Continue vancomycin for planned 10-day course  Continue current psychiatric medications  Mobilize in room  Probable discharge back to the [facility] when medically stable

## 2020-12-15 NOTE — PLAN OF CARE
"  VS: VSS. Tmax 98.5. LS clear, O2 98% on RA.    Output: Voiding spontaneously, 3 loose BMs this evening.    Activity: Independent in room.   Skin: WNL ex scars/tattoos    Pain: C/o \"small headache\" this eleazar that resolved with tylenol   Neuro/CMS: Intact. Endorses auditory hallucinations/voices talking to her but denies SI at this time. Zyprexa given x1 for anxiety.    Dressing(s): PIV drsg intact   Diet: Low fiber, good intake.    LDA: PIV L hand SL, IVF discontinued.    Plan: TBD   Additional Info:   Pt requested to go back to facility this evening but RN stated the patient should wait until a plan is more solidified, especially considering her C-diff status. Pt initially reluctant but did agree to stay tonight.      Nicorette gum given x2  Nicotine patch in place R shoulder  Contraceptive patch in place L shoulder       "

## 2020-12-16 PROCEDURE — 250N000011 HC RX IP 250 OP 636: Performed by: HOSPITALIST

## 2020-12-16 PROCEDURE — 99231 SBSQ HOSP IP/OBS SF/LOW 25: CPT | Performed by: INTERNAL MEDICINE

## 2020-12-16 PROCEDURE — 250N000013 HC RX MED GY IP 250 OP 250 PS 637: Performed by: EMERGENCY MEDICINE

## 2020-12-16 PROCEDURE — 250N000013 HC RX MED GY IP 250 OP 250 PS 637: Performed by: HOSPITALIST

## 2020-12-16 PROCEDURE — 120N000002 HC R&B MED SURG/OB UMMC

## 2020-12-16 PROCEDURE — 250N000013 HC RX MED GY IP 250 OP 250 PS 637: Performed by: PSYCHIATRY & NEUROLOGY

## 2020-12-16 RX ADMIN — OMEPRAZOLE 20 MG: 20 CAPSULE, DELAYED RELEASE ORAL at 08:56

## 2020-12-16 RX ADMIN — LITHIUM CARBONATE 900 MG: 450 TABLET, EXTENDED RELEASE ORAL at 22:17

## 2020-12-16 RX ADMIN — GABAPENTIN 1200 MG: 400 CAPSULE ORAL at 12:49

## 2020-12-16 RX ADMIN — VANCOMYCIN HYDROCHLORIDE 125 MG: 125 CAPSULE ORAL at 12:49

## 2020-12-16 RX ADMIN — GABAPENTIN 900 MG: 300 CAPSULE ORAL at 19:46

## 2020-12-16 RX ADMIN — VANCOMYCIN HYDROCHLORIDE 125 MG: 125 CAPSULE ORAL at 19:46

## 2020-12-16 RX ADMIN — GABAPENTIN 900 MG: 300 CAPSULE ORAL at 08:56

## 2020-12-16 RX ADMIN — VANCOMYCIN HYDROCHLORIDE 125 MG: 125 CAPSULE ORAL at 15:39

## 2020-12-16 RX ADMIN — LITHIUM CARBONATE 300 MG: 300 TABLET, EXTENDED RELEASE ORAL at 08:56

## 2020-12-16 RX ADMIN — CYPROHEPTADINE HYDROCHLORIDE 4 MG: 4 TABLET ORAL at 22:17

## 2020-12-16 RX ADMIN — LISDEXAMFETAMINE DIMESYLATE 40 MG: 20 CAPSULE ORAL at 08:56

## 2020-12-16 RX ADMIN — QUETIAPINE FUMARATE 100 MG: 100 TABLET ORAL at 22:17

## 2020-12-16 RX ADMIN — OLANZAPINE 2.5 MG: 2.5 TABLET, FILM COATED ORAL at 18:30

## 2020-12-16 RX ADMIN — VANCOMYCIN HYDROCHLORIDE 125 MG: 125 CAPSULE ORAL at 08:56

## 2020-12-16 RX ADMIN — ENOXAPARIN SODIUM 40 MG: 40 INJECTION SUBCUTANEOUS at 15:39

## 2020-12-16 RX ADMIN — NICOTINE POLACRILEX 2 MG: 2 GUM, CHEWING ORAL at 18:31

## 2020-12-16 RX ADMIN — OLANZAPINE 2.5 MG: 2.5 TABLET, FILM COATED ORAL at 12:49

## 2020-12-16 RX ADMIN — NICOTINE 1 PATCH: 14 PATCH, EXTENDED RELEASE TRANSDERMAL at 08:57

## 2020-12-16 NOTE — PLAN OF CARE
Patient appeared to sleep each time safety rounds were completed overnight. At 0500 patient was found to be awake. At that time patient said she did not need anything and refused assessment.

## 2020-12-16 NOTE — PLAN OF CARE
VS: VSS. Denies CP/SOB   O2: >90% on RA   Output: Voiding adequate amounts w/o pain or difficulty   Last BM: 12/15, Pt reported several loose stools today. Denies N/V this shift    Activity: Up independently, steady gait    Up for meals? Yes   Skin: Visible skin intact   Pain: Reported abdominal pain and burning Maalox given but no improvement. GI cocktail ordered and given.    CMS: Intact    Dressing: None   Diet: Low fiber diet, tolerating ok   LDA: PIV saline locked    Equipment: IV pole,    Plan: discharge back to the [facility] when medically stable   Additional Info: Denies SI this shift

## 2020-12-16 NOTE — PROGRESS NOTES
Patient states she is feeling a little better.  She had several loose stools during the day and evening.  She has mild abdominal cramping.  Denies nausea or vomiting.    Afebrile  Vital signs stable    Patient is sleeping, lying on stomach.  Appears comfortable (she states she was up much of the night watching television)  Abdomen soft, protuberant, nontender  CV regular rhythm      Assessment    C. difficile colitis, diagnosed 12/9/2020 but not started on vancomycin until 12 Dec. continued diarrhea though overall appears clinically improved.  Abdominal exam is benign.  Oral intake adequate     Leukocytosis, possibly secondary to C. difficile versus lithium     Schizoaffective disorder, bipolar type.  On lithium, as needed Zyprexa, at bedtime Seroquel, stable.  Patient presented to the ER with suicidal thoughts.  Psychiatric status seems improved.  She has been seen by psychiatry, has been started on cyproheptadine for PTSD, remains on at bedtime Seroquel, lithium and as needed Zyprexa.  Suspect patient will be safe for discharge back to her facility when her medical status improves.    Plan:  Continue oral vancomycin for planned 10-day course  Monitor GI symptoms  Continue current psychiatric medications  Anticipate discharge back to her facility at the end the week assuming improvement in GI status.

## 2020-12-16 NOTE — PLAN OF CARE
VS: VSS   Output: Voiding spontaneously, 9 loose BM this shift   Activity: Independent in room.   Skin: WDL ex scars/tattoos    Pain: Denies   Neuro/CMS: Intact. Endorses auditory hallucinations although denies SI at this time.    Dressing(s): None   Diet: Tolerating regular diet    LDA: PIV L hand SL   Plan: Continue oral vancomycin for planned 10-day course  Monitor GI symptoms  Continue current psychiatric medications  Anticipate discharge back to her facility at the end the week assuming improvement in GI status.   Additional Info:    Denies SI  Nicorette gum given PRN  Nicotine patch in place L shoulder  Contraceptive patch in place L shoulder

## 2020-12-17 LAB
ANION GAP SERPL CALCULATED.3IONS-SCNC: 9 MMOL/L (ref 3–14)
BUN SERPL-MCNC: 11 MG/DL (ref 7–30)
CALCIUM SERPL-MCNC: 8.2 MG/DL (ref 8.5–10.1)
CHLORIDE SERPL-SCNC: 113 MMOL/L (ref 94–109)
CO2 SERPL-SCNC: 19 MMOL/L (ref 20–32)
CREAT SERPL-MCNC: 0.64 MG/DL (ref 0.52–1.04)
GFR SERPL CREATININE-BSD FRML MDRD: >90 ML/MIN/{1.73_M2}
GLUCOSE SERPL-MCNC: 156 MG/DL (ref 70–99)
LACTATE BLD-SCNC: 1.9 MMOL/L (ref 0.7–2)
LACTATE BLD-SCNC: 2.8 MMOL/L (ref 0.7–2)
LACTATE BLD-SCNC: 3.7 MMOL/L (ref 0.7–2)
POTASSIUM SERPL-SCNC: 3.8 MMOL/L (ref 3.4–5.3)
SODIUM SERPL-SCNC: 141 MMOL/L (ref 133–144)

## 2020-12-17 PROCEDURE — 258N000003 HC RX IP 258 OP 636: Performed by: INTERNAL MEDICINE

## 2020-12-17 PROCEDURE — 36415 COLL VENOUS BLD VENIPUNCTURE: CPT | Performed by: INTERNAL MEDICINE

## 2020-12-17 PROCEDURE — 250N000011 HC RX IP 250 OP 636: Performed by: HOSPITALIST

## 2020-12-17 PROCEDURE — 250N000013 HC RX MED GY IP 250 OP 250 PS 637: Performed by: PEDIATRICS

## 2020-12-17 PROCEDURE — 80048 BASIC METABOLIC PNL TOTAL CA: CPT | Performed by: PEDIATRICS

## 2020-12-17 PROCEDURE — 250N000013 HC RX MED GY IP 250 OP 250 PS 637: Performed by: INTERNAL MEDICINE

## 2020-12-17 PROCEDURE — 99221 1ST HOSP IP/OBS SF/LOW 40: CPT | Performed by: INTERNAL MEDICINE

## 2020-12-17 PROCEDURE — 120N000002 HC R&B MED SURG/OB UMMC

## 2020-12-17 PROCEDURE — 99232 SBSQ HOSP IP/OBS MODERATE 35: CPT | Performed by: INTERNAL MEDICINE

## 2020-12-17 PROCEDURE — 258N000003 HC RX IP 258 OP 636

## 2020-12-17 PROCEDURE — 36415 COLL VENOUS BLD VENIPUNCTURE: CPT | Performed by: PEDIATRICS

## 2020-12-17 PROCEDURE — 83605 ASSAY OF LACTIC ACID: CPT | Performed by: INTERNAL MEDICINE

## 2020-12-17 PROCEDURE — 250N000013 HC RX MED GY IP 250 OP 250 PS 637: Performed by: PSYCHIATRY & NEUROLOGY

## 2020-12-17 PROCEDURE — 83605 ASSAY OF LACTIC ACID: CPT | Performed by: PEDIATRICS

## 2020-12-17 PROCEDURE — 250N000013 HC RX MED GY IP 250 OP 250 PS 637: Performed by: EMERGENCY MEDICINE

## 2020-12-17 PROCEDURE — 250N000013 HC RX MED GY IP 250 OP 250 PS 637: Performed by: HOSPITALIST

## 2020-12-17 RX ORDER — VANCOMYCIN HYDROCHLORIDE 250 MG/1
250 CAPSULE ORAL 4 TIMES DAILY
Status: DISCONTINUED | OUTPATIENT
Start: 2020-12-17 | End: 2020-12-17

## 2020-12-17 RX ORDER — SODIUM CHLORIDE 9 MG/ML
INJECTION, SOLUTION INTRAVENOUS
Status: COMPLETED
Start: 2020-12-17 | End: 2020-12-17

## 2020-12-17 RX ORDER — SODIUM CHLORIDE 9 MG/ML
INJECTION, SOLUTION INTRAVENOUS CONTINUOUS
Status: DISCONTINUED | OUTPATIENT
Start: 2020-12-17 | End: 2020-12-17

## 2020-12-17 RX ORDER — ACETAMINOPHEN 325 MG/1
975 TABLET ORAL EVERY 6 HOURS PRN
Status: DISCONTINUED | OUTPATIENT
Start: 2020-12-17 | End: 2020-12-19 | Stop reason: HOSPADM

## 2020-12-17 RX ADMIN — LISDEXAMFETAMINE DIMESYLATE 40 MG: 20 CAPSULE ORAL at 08:11

## 2020-12-17 RX ADMIN — CYPROHEPTADINE HYDROCHLORIDE 4 MG: 4 TABLET ORAL at 21:40

## 2020-12-17 RX ADMIN — GABAPENTIN 900 MG: 300 CAPSULE ORAL at 08:14

## 2020-12-17 RX ADMIN — FIDAXOMICIN 200 MG: 200 TABLET, FILM COATED ORAL at 20:20

## 2020-12-17 RX ADMIN — GABAPENTIN 1200 MG: 400 CAPSULE ORAL at 12:17

## 2020-12-17 RX ADMIN — ONDANSETRON 4 MG: 2 INJECTION INTRAMUSCULAR; INTRAVENOUS at 01:45

## 2020-12-17 RX ADMIN — NICOTINE 1 PATCH: 14 PATCH, EXTENDED RELEASE TRANSDERMAL at 08:12

## 2020-12-17 RX ADMIN — NICOTINE POLACRILEX 2 MG: 2 GUM, CHEWING ORAL at 19:26

## 2020-12-17 RX ADMIN — SODIUM CHLORIDE: 9 INJECTION, SOLUTION INTRAVENOUS at 01:32

## 2020-12-17 RX ADMIN — NICOTINE POLACRILEX 2 MG: 2 GUM, CHEWING ORAL at 21:44

## 2020-12-17 RX ADMIN — VANCOMYCIN HYDROCHLORIDE 250 MG: 250 CAPSULE ORAL at 08:14

## 2020-12-17 RX ADMIN — OMEPRAZOLE 20 MG: 20 CAPSULE, DELAYED RELEASE ORAL at 08:14

## 2020-12-17 RX ADMIN — ENOXAPARIN SODIUM 40 MG: 40 INJECTION SUBCUTANEOUS at 14:19

## 2020-12-17 RX ADMIN — SODIUM CHLORIDE 1000 ML: 9 INJECTION, SOLUTION INTRAVENOUS at 08:18

## 2020-12-17 RX ADMIN — OLANZAPINE 2.5 MG: 2.5 TABLET, FILM COATED ORAL at 16:34

## 2020-12-17 RX ADMIN — NICOTINE POLACRILEX 2 MG: 2 GUM, CHEWING ORAL at 18:29

## 2020-12-17 RX ADMIN — QUETIAPINE FUMARATE 100 MG: 100 TABLET ORAL at 21:40

## 2020-12-17 RX ADMIN — FIDAXOMICIN 200 MG: 200 TABLET, FILM COATED ORAL at 12:18

## 2020-12-17 RX ADMIN — LITHIUM CARBONATE 900 MG: 450 TABLET, EXTENDED RELEASE ORAL at 21:40

## 2020-12-17 RX ADMIN — GABAPENTIN 900 MG: 300 CAPSULE ORAL at 19:27

## 2020-12-17 RX ADMIN — LITHIUM CARBONATE 300 MG: 300 TABLET, EXTENDED RELEASE ORAL at 08:14

## 2020-12-17 RX ADMIN — ACETAMINOPHEN 975 MG: 325 TABLET, FILM COATED ORAL at 01:00

## 2020-12-17 NOTE — PLAN OF CARE
VS: Pt tachycardic and tachypenic, with high BP at start of shift. Vital signs slowly returned to WDL towards end of shift   O2: Stable on RA   Output: 2 loose stools this shift. 1 emesis episode around 0115 of undigested food. Pt states that it does not burn when she pees, but that she does not think she pees enough for how much fluid she drinks during the day   Last BM: 12/17/20   Activity: Up independently in room    Skin: Intact ex old self  Inflicted cutting scars   Pain: Intense abdominal pain. Tylenol x1 and zofran x1 this shift   CMS: Intact    Dressing: None    Diet: Fiber restricted diet    LDA: None. Both PIV inserted this shift infiltrated    Equipment: IV pump and pole, personal belongings    Plan: Continue to monitor throughout shift and intervene when necessary. As of now, continue oral vancomycin dose for 10-day course.    Additional Info: Denies SI this shift.  Stated increased anxiety during rapid response and the hours following     See previous notes for details from Rapid response

## 2020-12-17 NOTE — PROVIDER NOTIFICATION
0030: Lab called with Critical lab value. Lactic Acid of 3.7    0033: Rapid response called and Overnight MD paged. RRT responded. Respiratory left due pt maintaining airway independently. IV placed.   MD ordered:  * 0.9% Normal Saline 1000 mL at 500 mL/hr with lactic recheck at 0400.  * Tylenol increased to 975mg Q6H  * Conditional order for culture draw if pt spikes fever of 101 or greater.   * CBC & BMP with AM lab draw

## 2020-12-17 NOTE — PROGRESS NOTES
X COVER note    S  :Rapid response was called for lactate of 3.7  Patient was having stable diarrhea, abdominal pain no nausea or vomiting  No changes    O  exam  /62 (BP Location: Right arm)   Pulse 109   Temp 98.8  F (37.1  C) (Oral)   Resp 26   SpO2 96%   General: Tired appearing woman lying up in bed  Head: NC, AT  Eye: symm gaze, anicteric sclerae  ENT: patent nares wo drainage/epistaxis  Pulm: CTAB, comfortable WOB on room air  CV: normal rate, regular rhythm, faint 1 out of 6 systolic murmur heard ?best at the apex  GI: soft, obese, diffuse abdominal tenderness, no percussion tenderness, no rebound, no guarding, no referred abdominal pain  Neuro: awake, alert, hearing speech and phonation, intact grossly     A/P:  * 0.9% Normal Saline 1000 mL at 500 mL/hr with lactic recheck at 0400.  * Tylenol increased to 975mg Q6H  * Conditional order for culture draw if pt spikes fever of 101 or greater.   * CBC & BMP with AM lab draw
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
## 2020-12-17 NOTE — PROGRESS NOTES
VS: VSS   Output: Voiding spontaneously, 3 loose watery stools this shift.   Activity: Independent in room.   Skin: WDL ex scars/tattoos    Pain: Denies   Neuro/CMS: Intact. Baseline auditory hallucinations although denies SI at this time.    Dressing(s): None   Diet: Tolerating regular diet    LDA: PIV L hand SL   Plan: Continue oral vancomycin for planned 10-day course  Monitor GI symptoms  Continue current psychiatric medications  Anticipate discharge back to her facility at the end the week assuming improvement in GI status.   Additional Info:    Patient cooperative and calm. Call light with reach. Able to make needs known.

## 2020-12-17 NOTE — PROGRESS NOTES
Pt was seen, course reviewed with team.    Pt triggered sepsis alarm last night, secondary to tachycardia. Lactic acid 3.7--1 L NS--2.8.  Afebrile    Pt has continued to have frequent (up to 7) loose stools per day, had an episode of emesis early am.  Notes mild abd cramps.  States symptoms are similar to past episode of C diff (2019), at which time Vancomycin was not helpful, ultimately symptoms resolved with fidaxomicin       She denies cough, chest pain, SOB    Pt is sleepy, in NAD  Fully oriented  Lungs clear  CV rrr  Abd soft, obese, minimal generalized discomfort   No LE edema    Results for SOMMER GARCIA (MRN [id number]) as of 12/17/2020 09:12   Ref. Range 12/17/2020 04:13   Sodium Latest Ref Range: 133 - 144 mmol/L 141   Potassium Latest Ref Range: 3.4 - 5.3 mmol/L 3.8   Chloride Latest Ref Range: 94 - 109 mmol/L 113 (H)   Carbon Dioxide Latest Ref Range: 20 - 32 mmol/L 19 (L)   Urea Nitrogen Latest Ref Range: 7 - 30 mg/dL 11   Creatinine Latest Ref Range: 0.52 - 1.04 mg/dL 0.64   GFR Estimate Latest Ref Range: >60 mL/min/1.73_m2 >90   GFR Estimate If Black Latest Ref Range: >60 mL/min/1.73_m2 >90   Calcium Latest Ref Range: 8.5 - 10.1 mg/dL 8.2 (L)   Anion Gap Latest Ref Range: 3 - 14 mmol/L 9   Lactic Acid Latest Ref Range: 0.7 - 2.0 mmol/L 2.8 (H)   Glucose Latest Ref Range: 70 - 99 mg/dL 156 (H)       Assessment    Persistent diarrhea, C diff +. History of C diff, apparently refractory to Vancomycin in the past. History of what sounds likely irritable bowel syndrome, worked up in the past by GI, neg colonoscopy 10 years ago, currently being followed by GI as outpt.  Suspect + lactic acid related to volume depletion secondary to emesis. Pt is hemodynamically stable. Abd exam is benign. Pt is afebrile.  Doubt sepsis    Leukocytosis, possibly secondary to C. difficile versus lithium     Schizoaffective disorder, bipolar type.  On lithium, as needed Zyprexa, at bedtime Seroquel, stable.  Patient presented  to the ER with suicidal thoughts.  Psychiatric status seems improved.  She has been seen by psychiatry, has been started on cyproheptadine for PTSD, remains on at bedtime Seroquel, lithium and as needed Zyprexa.  Suspect patient will be safe for discharge back to her facility when her medical status improves.    Plan  Review with ID and GI  Start Fidaxomicin   IV fluids, monitor vitals, GI status  Repeat Lactic acid level this am

## 2020-12-17 NOTE — PLAN OF CARE
VS: VSS, pt denies CP or SOB.   O2: Room air sat. > 90%.   Output: Voiding adequate amount without difficulty.    Last BM: 12/17/20 loose stool x 8 per pt report MD aware.   Activity: Up independent in the room.    Skin: Intact.    Pain: Denies pain or discomfort.    CMS: CMS and neuro intact.    Dressing: None.    Diet: Regular tolerating without N/V.    LDA: PIV SL.    Equipment: IV pole and personal belongings.    Plan: TBD.    Additional Info: Pt denied suicidal ideation.

## 2020-12-17 NOTE — PROVIDER NOTIFICATION
0437:  Critical lab: Lactic 2.8 down from 3.7    Thanks  Shanice # [phone] ext. [phone]: Follow up page sent. Awaiting response

## 2020-12-17 NOTE — PHARMACY
Prescriber Notification Note    The pharmacist has communicated with this patient's provider regarding a concern or therapy recommendation.    Notified Person: Dr. Pendleton  Date/Time of Notification: 12/17/20 @ 1030  Interaction: phone  Concern/Recommendation:  Fidaxomicin is restricted to ID approval, ID consulted?      Comments/Additional Details:  ID approved. Full consult to follow.     Michelle Cintron, PharmD, BCPS

## 2020-12-18 LAB
C DIFF TOX B STL QL: NEGATIVE
CRP SERPL-MCNC: 11 MG/L (ref 0–8)
PLATELET # BLD AUTO: 346 10E9/L (ref 150–450)
SPECIMEN SOURCE: NORMAL

## 2020-12-18 PROCEDURE — 36415 COLL VENOUS BLD VENIPUNCTURE: CPT | Performed by: HOSPITALIST

## 2020-12-18 PROCEDURE — 85049 AUTOMATED PLATELET COUNT: CPT | Performed by: HOSPITALIST

## 2020-12-18 PROCEDURE — 87329 GIARDIA AG IA: CPT | Performed by: INTERNAL MEDICINE

## 2020-12-18 PROCEDURE — 250N000013 HC RX MED GY IP 250 OP 250 PS 637: Performed by: HOSPITALIST

## 2020-12-18 PROCEDURE — 99232 SBSQ HOSP IP/OBS MODERATE 35: CPT | Performed by: INTERNAL MEDICINE

## 2020-12-18 PROCEDURE — 99207 PR CDG-MDM COMPONENT: MEETS MODERATE - UP CODED: CPT | Performed by: INTERNAL MEDICINE

## 2020-12-18 PROCEDURE — 250N000013 HC RX MED GY IP 250 OP 250 PS 637: Performed by: PSYCHIATRY & NEUROLOGY

## 2020-12-18 PROCEDURE — 250N000013 HC RX MED GY IP 250 OP 250 PS 637: Performed by: EMERGENCY MEDICINE

## 2020-12-18 PROCEDURE — 250N000013 HC RX MED GY IP 250 OP 250 PS 637: Performed by: INTERNAL MEDICINE

## 2020-12-18 PROCEDURE — 86140 C-REACTIVE PROTEIN: CPT | Performed by: INTERNAL MEDICINE

## 2020-12-18 PROCEDURE — 120N000002 HC R&B MED SURG/OB UMMC

## 2020-12-18 PROCEDURE — 87328 CRYPTOSPORIDIUM AG IA: CPT | Performed by: INTERNAL MEDICINE

## 2020-12-18 PROCEDURE — 87493 C DIFF AMPLIFIED PROBE: CPT | Performed by: INTERNAL MEDICINE

## 2020-12-18 PROCEDURE — 250N000011 HC RX IP 250 OP 636: Performed by: HOSPITALIST

## 2020-12-18 RX ADMIN — FIDAXOMICIN 200 MG: 200 TABLET, FILM COATED ORAL at 11:38

## 2020-12-18 RX ADMIN — NICOTINE POLACRILEX 2 MG: 2 GUM, CHEWING ORAL at 13:43

## 2020-12-18 RX ADMIN — LISDEXAMFETAMINE DIMESYLATE 40 MG: 20 CAPSULE ORAL at 08:13

## 2020-12-18 RX ADMIN — NICOTINE 1 PATCH: 14 PATCH, EXTENDED RELEASE TRANSDERMAL at 08:15

## 2020-12-18 RX ADMIN — OMEPRAZOLE 20 MG: 20 CAPSULE, DELAYED RELEASE ORAL at 08:14

## 2020-12-18 RX ADMIN — NICOTINE POLACRILEX 2 MG: 2 GUM, CHEWING ORAL at 11:39

## 2020-12-18 RX ADMIN — GABAPENTIN 900 MG: 300 CAPSULE ORAL at 08:14

## 2020-12-18 RX ADMIN — LITHIUM CARBONATE 900 MG: 450 TABLET, EXTENDED RELEASE ORAL at 22:18

## 2020-12-18 RX ADMIN — GABAPENTIN 1200 MG: 400 CAPSULE ORAL at 11:38

## 2020-12-18 RX ADMIN — NICOTINE POLACRILEX 2 MG: 2 GUM, CHEWING ORAL at 15:36

## 2020-12-18 RX ADMIN — CYPROHEPTADINE HYDROCHLORIDE 4 MG: 4 TABLET ORAL at 22:19

## 2020-12-18 RX ADMIN — ENOXAPARIN SODIUM 40 MG: 40 INJECTION SUBCUTANEOUS at 13:43

## 2020-12-18 RX ADMIN — QUETIAPINE FUMARATE 100 MG: 100 TABLET ORAL at 22:19

## 2020-12-18 RX ADMIN — GABAPENTIN 900 MG: 300 CAPSULE ORAL at 22:19

## 2020-12-18 RX ADMIN — NICOTINE POLACRILEX 2 MG: 2 GUM, CHEWING ORAL at 18:16

## 2020-12-18 RX ADMIN — LITHIUM CARBONATE 300 MG: 300 TABLET, EXTENDED RELEASE ORAL at 08:14

## 2020-12-18 RX ADMIN — FIDAXOMICIN 200 MG: 200 TABLET, FILM COATED ORAL at 23:33

## 2020-12-18 NOTE — PROGRESS NOTES
Pt feels improved  No stools overnight  Occasional abd cramping  No nausea or emesis    VSS  Afebrile  Sleepy, lying in bed, appears comfortable  Lungs clear  CV rrr  Abd soft, protuberant, non-tender      CRP 11  F/u C diff and cryptosporidium obtained today, pending    Assessment    Diarrhea, recent + C diff, not improved with Vanco, now on fidaxomicin for 10 day course (started 12/17). Unclear if diarrhea truly due to C diff vs IBS or some other etiology.  GI recommends repeat C diff (ok to use imodium if neg), monitor response to fidaxomicin, outpt f/u  Pt states she feels improved this am.    Schizoaffective disorder, bipolar type.  On lithium, as needed Zyprexa, at bedtime Seroquel, stable.  Patient presented to the ER with suicidal thoughts.  Psychiatric status seems improved, though Pt is anxious, frustrated by prolonged hospital course. She has been seen by psychiatry, has been started on cyproheptadine for PTSD, remains on at bedtime Seroquel, lithium and as needed Zyprexa.  Pt will discharge back to her facility when her medical status improves.    Plan  Monitor response to treatment  Stool for C diff  Prob discharge to facility on 12/21/2020

## 2020-12-18 NOTE — PLAN OF CARE
VS: VSS, pt denies CP or SOB.    O2: Room air sat. > 90%.   Output: Voiding adequate amount without difficulty.   Last BM: 12/18/20 pt. reported 2 soft BM  today   Activity: Up independent in the room.    Skin: Intact.   Pain: Denies pain or discomfort.    CMS: CMS and neuro intact.    Dressing: None.    Diet: Regular tolerating okay.    LDA: PIV SL.    Equipment: IV pole and personal belongings.    Plan: TBD.   Additional Info: Pt denied suicidal ideation.

## 2020-12-18 NOTE — PLAN OF CARE
[id number]  Patient A & O x4. VSS. Pt up IND in the room. Pt very cooperative and pleasant. Frequent requests for nicotine gum. Lung sounds . Patient denies chest pain, SOB, lightheadedness, dizziness, tingling, numbness, nausea, and vomiting. Bowel sounds active, last BM 12/18 pt reports x3 today, passing flatus, and tolerating regular diet. Drinking well and voiding spontaneously without difficulties. PIV SL. Denies pain. Plan is to discharge to [facility] in next 1-2 days. Patient demonstrates ability to use call light appropriately. Will continue to monitor patient.
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
## 2020-12-18 NOTE — PLAN OF CARE
VS: VSS   O2: >90% on RA, denies SOB or CP   Output: Voiding without difficulty in BR, pt sleeping most of the night.   Last BM: LBM 12/17 Stool sample needed, hat in bathroom.    Activity: Up ad joshua, slept through the night.   Skin: Visible skin intact, warm, and dry. Refused full skin assessment.    Pain: No new reports of pain to this RN, no PRNs requested.   CMS: CMS intact, denies N/T.   Dressing: None   Diet: Regular diet, no c/o nausea/vomiting to this writer.   LDA: PIV SL, flushed and patent. Small catheter in place, flush slowly.   Equipment: Personal belongings at bedside   Plan: Continue to monitor patient, plan to discharge back to prior living facility ([facility]) when medically stable. Started on Fidaxomicin for c.diff. (Direct copy of Shanice Hendrickson, RNs details) ~ all remain pertinent for next shift.   Additional Info: Has nicotine and birth control patches in place on L arm.     If C.diff negative, pt may have immodium added PRN. CRP to be completed this AM with lab tests.

## 2020-12-18 NOTE — CONSULTS
Care Management Initial Consult    General Information  Assessment completed with: Patient,    Type of CM/SW Visit: Initial Assessment    Primary Care Provider verified and updated as needed: No   Readmission within the last 30 days: current reason for admission unrelated to previous admission   Return Category: New Diagnosis  Reason for Consult: mental health concerns, discharge planning  Advance Care Planning: Advance Care Planning Reviewed: education/resources on health care directives provided          Communication Assessment  Patient's communication style: spoken language (English or Bilingual)    Hearing Difficulty or Deaf: no   Wear Glasses or Blind: yes    Cognitive  Cognitive/Neuro/Behavioral: WDL  Level of Consciousness: alert  Arousal Level: arouses to touch/gentle shaking  Orientation: oriented x 4  Mood/Behavior: cooperative(pt sleeping most of the night)  Best Language: 0 - No aphasia  Speech: clear, logical, spontaneous    Living Environment:   People in home: other (see comments)     Current living Arrangements: homeless, shelter      Able to return to prior arrangements: no  Living Arrangement Comments: Was previously at [facility] but was kicked out she reports    Family/Social Support:  Care provided by: self  Provides care for: no one  Marital Status:   Significant Other          Description of Support System: Involved    Support Assessment: Lacks necessary supervision and assistance    Current Resources:   Skilled Home Care Services:    Community Resources: [facility]  Equipment currently used at home: none  Supplies currently used at home: None    Employment/Financial:  Employment Status: unemployed        Financial Concerns: No concerns identified   Referral to Financial Counselor: No       Lifestyle & Psychosocial Needs:        Socioeconomic History     Marital status:      Spouse name: Not on file     Number of children: 0     Years of education: Not on file     Highest education  level: Not on file   Occupational History     Employer: [employer]     Tobacco Use     Smoking status: Current Every Day Smoker     Packs/day: 1.00     Years: 5.00     Pack years: 5.00     Types: Dip, chew, snus or snuff, Other     Start date: 8/1/2011     Smokeless tobacco: Current User     Types: Chew     Last attempt to quit: 12/17/2019   Substance and Sexual Activity     Alcohol use: No     Alcohol/week: 0.0 standard drinks     Drug use: No     Types: Opiates     Comment: oxy, heroin (smoke)     Sexual activity: Yes     Partners: Female     Birth control/protection: None       Functional Status:  Prior to admission patient needed assistance:              Mental Health Status:  Mental Health Status: Current Concern  Mental Health Management: Other (see comment)    Chemical Dependency Status:  Chemical Dependency Status: Past Concern  Chemical Dependency Management: Other (see comment)          Values/Beliefs:  Spiritual, Cultural Beliefs, Congregation Practices, Values that affect care: no               Additional Information:  SW met with pt to provide introductions and role within the treatment team. Per provider, pt is currently being treated for C.Diff and the hope is that the Fidaxomicin started will reduce her symptoms. Provider hopeful for a possible discharge on Monday 12/21.    Per pt, she was at [facility] prior to the hospital but has been kicked out for being 'too contagious.' Pt does not want to return. Pt has a friend's house she would like to stay at upon discharge. Pt reports her commitment CM  is involved and is supportive of this.    SW spoke to  and  confirmed that pt was unable to return to [facility], nor did pt want to. Per , pt's CADI is being reinstated in January and pt has already been accepted to a group home (cannot admit until her CADI is opened).      is in agreement with pt discharging to her friend's home until her CADI is established and she can  transition to the group home.  reports that pt does not want to share her friend's name but is agreeable to share the address with  of where she will be- is okay with this.     No provisional discharge will be required upon discharge.  will need the discharge summary emailed to her though.    Monse HAYDEN  Phone:  [phone]  Email:[email or web address]     MONIQUE Redman  Unit 5/[address] 8 Ortho/Med/Surg & [facility]  Phone: [phone] Pager: [phone]

## 2020-12-18 NOTE — PROGRESS NOTES
Brief GI note:    Noted that pt's CDiff test has come back negative, consistent with suspected colonization.  Called patient to update her with plan, and she now informs me that her diarrhea is completely resolved. She states that she only had one normal BM today. She would like to leave the hospital ASAP.     Plan:  --Ok to trial stopping all antibiotics  --Ok to give Imodium 2mg QID prn  --Please given fiber daily  --If ongoing diarrhea, please recheck CDiff and calprotectin in 2 days.     F/u with Katiuska Viramontes outpatient (GI will arrange)    The [facility] team will sign off. Thank you for involving us in this patient's care. Please do not hesitate to call if any further questions or concerns.      Discussed with Dr. Watts and Dr. Chester Grijalva MD, [facility]  Gastroenterology Fellow, PGY5  Pager [phone]

## 2020-12-18 NOTE — PLAN OF CARE
VS: VSS ex tachycardic  Temp: 97.2  F (36.2  C) Temp src: Oral BP: 130/81 Pulse: 108   Resp: 16 SpO2: 99 % O2 Device: None (Room air)     O2: >90% on RA, denies SOB or CP   Output: Voiding without difficulty in BR   Last BM: LBM 12/17 x9 per pt, loose per pt. Reports occasional abdominal cramping with stooling. BS active. Stool sample needed and pt aware- hat in bathroom.    Activity: Up ad joshua. Requested to leave room, told pt she may walk hallways if she has mask in place. Reported to pt that she cannot leave unit or will be discharged AMA.    Skin: Visible skin intact, warm, and dry. Refused full skin assessment.    Pain: Reports mild abdominal cramping and flank pain with stooling but declined medications.    CMS: CMS intact, denies N/T.   Dressing: None   Diet: Regular diet, epsiode of nausea earlier. Denied on shift. No emesis. Adequate intake of PO fluids.    LDA: PIV SL, flushed and patent. Small catheter in place, flush slowly.   Equipment: Personal belongings at bedside   Plan: Continue to monitor patient, plan to discharge back to prior living facility ([facility]) when medically stable. Started on Fidaxomicin for c.diff.    Additional Info: Visibly anxious at start of shift and verbalized increased anxiety- did not want to speak with RN regarding why. Requested space. Given zyprexa PRN for increased anxiety- denied AH. Denies SI. Zyprexa provided relief. Nicotine gum given PRN.    Has nicotine and birth control patches in place on L arm.    If C.diff negative pt may have immodium added PRN. CRP added for AM labs.

## 2020-12-19 VITALS
OXYGEN SATURATION: 98 % | TEMPERATURE: 96.3 F | HEART RATE: 97 BPM | RESPIRATION RATE: 16 BRPM | SYSTOLIC BLOOD PRESSURE: 105 MMHG | DIASTOLIC BLOOD PRESSURE: 61 MMHG

## 2020-12-19 LAB
SARS-COV-2 RNA SPEC QL NAA+PROBE: NOT DETECTED
SPECIMEN SOURCE: NORMAL

## 2020-12-19 PROCEDURE — 250N000013 HC RX MED GY IP 250 OP 250 PS 637: Performed by: INTERNAL MEDICINE

## 2020-12-19 PROCEDURE — 99238 HOSP IP/OBS DSCHRG MGMT 30/<: CPT | Performed by: INTERNAL MEDICINE

## 2020-12-19 PROCEDURE — 250N000013 HC RX MED GY IP 250 OP 250 PS 637: Performed by: EMERGENCY MEDICINE

## 2020-12-19 PROCEDURE — 250N000013 HC RX MED GY IP 250 OP 250 PS 637: Performed by: HOSPITALIST

## 2020-12-19 PROCEDURE — U0003 INFECTIOUS AGENT DETECTION BY NUCLEIC ACID (DNA OR RNA); SEVERE ACUTE RESPIRATORY SYNDROME CORONAVIRUS 2 (SARS-COV-2) (CORONAVIRUS DISEASE [COVID-19]), AMPLIFIED PROBE TECHNIQUE, MAKING USE OF HIGH THROUGHPUT TECHNOLOGIES AS DESCRIBED BY CMS-2020-01-R: HCPCS | Performed by: INTERNAL MEDICINE

## 2020-12-19 RX ORDER — CYPROHEPTADINE HYDROCHLORIDE 4 MG/1
4 TABLET ORAL AT BEDTIME
Qty: 30 TABLET | Refills: 1 | Status: SHIPPED | OUTPATIENT
Start: 2020-12-19 | End: 2020-12-19

## 2020-12-19 RX ORDER — OLANZAPINE 2.5 MG/1
2.5 TABLET, FILM COATED ORAL 3 TIMES DAILY PRN
Qty: 30 TABLET | Refills: 1 | Status: SHIPPED | OUTPATIENT
Start: 2020-12-19 | End: 2020-12-19

## 2020-12-19 RX ORDER — CYPROHEPTADINE HYDROCHLORIDE 4 MG/1
4 TABLET ORAL AT BEDTIME
Qty: 30 TABLET | Refills: 1 | Status: SHIPPED | OUTPATIENT
Start: 2020-12-19 | End: 2021-02-16

## 2020-12-19 RX ORDER — OLANZAPINE 2.5 MG/1
2.5 TABLET, FILM COATED ORAL 3 TIMES DAILY PRN
Qty: 30 TABLET | Refills: 1 | Status: SHIPPED | OUTPATIENT
Start: 2020-12-19 | End: 2021-02-16

## 2020-12-19 RX ADMIN — LISDEXAMFETAMINE DIMESYLATE 40 MG: 20 CAPSULE ORAL at 09:02

## 2020-12-19 RX ADMIN — GABAPENTIN 900 MG: 300 CAPSULE ORAL at 09:02

## 2020-12-19 RX ADMIN — LITHIUM CARBONATE 300 MG: 300 TABLET, EXTENDED RELEASE ORAL at 09:02

## 2020-12-19 RX ADMIN — OMEPRAZOLE 20 MG: 20 CAPSULE, DELAYED RELEASE ORAL at 09:02

## 2020-12-19 RX ADMIN — FIDAXOMICIN 200 MG: 200 TABLET, FILM COATED ORAL at 09:19

## 2020-12-19 NOTE — PLAN OF CARE
Pt denies suicidal ideation.Pt A&O x's 4. VSS. Afebrile. 02 sats in the 90s on RA.Lungs clear. Denies SOB, CP and nausea. Tolerating regular diet. Bowel sound active in all quadrants. No BM reported, pt slept. Denies Pain. CMS intact, denies N/T. Pt is able to make needs known, call light with in reach. Will continue to monitor.

## 2020-12-19 NOTE — DISCHARGE SUMMARY
Admit Date:     12/12/2020   Discharge Date:     12/19/2020      Ayana Prasad is a 30-year-old female with a history of bipolar affective disorder, GERD, history of C. diff colitis 2019, chronic intermittent nausea with vomiting and intermittent diarrhea, who was admitted to the hospital for the treatment of diarrhea with a recent positive C. diff toxin.  The patient had been seen in the gastrointestinal clinic a few days prior to admission for evaluation of the above symptoms, was found subsequently to have a stool positive for C. diff.  She did not  her prescription for vancomycin for 3 days, however, and presented to the ER with worsening diarrhea.  The assessment on admission was that the patient's diarrhea was related to recurrent Clostridium difficile infection.  She had not recently been on antibiotics.  As above, the patient has a complex GI history for which she has been followed by GI.  Last colonoscopy was performed in 2011 and was normal.  The patient had noted that her most recent episode of Clostridium difficile colitis in 2019 did not respond to vancomycin and was treated with fidaxomicin, with subsequent improvement.      HOSPITAL COURSE:  The following medical issues were addressed:   1.  Diarrhea.  As above, it was presumed that the patient's picture was at least partially explained by a recurrence of C. diff colitis.  She was started on oral vancomycin, but showed minimal improvement in her GI symptoms during the first 5 days of her hospitalization.  She described 6-9 loose stools per day with intermittent nausea and infrequent emesis.  She did trigger a rapid response on 12/16 for a lactic acid level 3.7.  This resolved with IV fluids.  It was felt most likely to be secondary to volume depletion.  Electrolytes and renal function remained stable.  Infectious Disease and GI were consulted with a concern of ongoing C. diff colitis refractory to vancomycin.  The patient was initially started  on fidaxomicin given her history of refractory C. diff.  A repeat C. diff study; however, as recommended by GI, was negative.  The patient noted very dramatic improvement in her GI status very shortly after starting fidaxomicin.  By the time of discharge, 2 days later, she had had no loose stools for approximately 24 hours.  Denied nausea, vomiting, was eating well.  The rapid improvement in her GI status in the setting of subsequent negative C. diff assay is somewhat confounding.  The GI Service feels that the C. diff positivity on admission likely represented a colonization, that the patient likely did not truly have C, difficile colitis.  Of note is that the patient had a benign abdominal exam throughout.  In addition, she had a normal fecal calprotectin assay in the GI Clinic prior to admission, which further suggests that this represents colonization.  The GI Service believes that her symptoms are more likely related to exacerbation of IBS and not true infection.  Fidaxomicin was discontinued after a 2-day course.  Vancomycin was not resumed.  The patient was apprised of the uncertain cause for her diarrhea, particularly as it relates to C. diff.   3.  Elevated lactic acid level 2 days prior to discharge, responsive to IV fluids, likely representing dehydration.   4.  History of bipolar affective disorder, schizoaffective disorder, personality disorder, PTSD, for which the patient is on a civil commitment.  She has been residing at the [facility] with medication management with the staff members for guidance.   noted by mutual agreement, the patient would not be returning to the [facility].  The patient's  was supportive of the patient discharging to a friend's home until her CADI is established, at which time she can transition to a group home.  The patient was in agreement with this plan.  Psychiatry consultation did suggest the addition of p.r.n. olanzapine in the daytime for  agitation as well as the use of cyproheptadine to reduce PTSD related nightmares.   5.  GERD:  The patient, as above, did have infrequent episodes of emesis, which seemed improved at the time of discharge; she was maintained on PPI therapy.      The patient will discharge to her friend's home with plans to transition to a group home.  She will follow-up with her psychiatrist Ashlyn Fields, as she is now a few days late for her scheduled Prolixin injection.  Per the patient, she has an appointment on .  She will also be contacted by the Gastroenterology Clinic for followup appointment with Katiuska Viramontes physician's assistant.      DISCHARGE MEDICATIONS:   1.  Ibuprofen on a p.r.n. basis.   2.  Zofran 4 mg ODT every 8 hours p.r.n. nausea.   3.  Omeprazole 20 mg daily.   4.  Seroquel 100 mg at bedtime.     5.  Maalox on a p.r.n. basis.   6.  Gabapentin 900 mg twice daily and 1200 mg at noon.   7.  Vyvanse 40 mg daily.   8.  Lithium 300 mg in morning and 900 mg at bedtime.   9.  Xulane patch weekly.   10.  Tylenol on a p.r.n. basis.   11.  Cyproheptadine 4 mg at bedtime.   12.  Olanzapine 2.5 mg t.i.d. p.r.n. agitation, delusions.   13.  Metamucil 1 capsule daily.      The patient was advised to contact her primary provider should she experience recurrence of severe diarrhea, particularly if accompanied by abdominal cramps, rectal bleeding or fever.         IRIS العلي MD             D: 2020   T: 2020   MT: DANG      Name:     SOMMER GARCIA   MRN:      [id number]        Account:        [id number]   :      1990           Admit Date:     2020                                  Discharge Date: 2020      Document: [id number]

## 2020-12-19 NOTE — PLAN OF CARE
[id number]  Patient A & O x4. VSS. Pt up IND in the room. Lung sounds . Patient denies chest pain, SOB, lightheadedness, dizziness, tingling, numbness, nausea, and vomiting. Bowel sounds active, last BM 12/18 denies diarrhea today, passing flatus, and tolerating regular diet. Drinking well and voiding spontaneously without difficulties. PIV removed. Denies pain.Plan is to discharge to [facility] house this morning and will later find a new group home. Patient demonstrates ability to use call light appropriately. Will continue to monitor patient.

## 2020-12-19 NOTE — DISCHARGE SUMMARY
DISCHARGE SUMMARY    Pt discharging to: friend's home  Transportation: friend will provide ride  AVS given and discussed: yes pt didn't have any questions  Medications given: No pt is restricted to home pharmacy prescriptions sent there pt will   Belongings returned: belongings returned from cart. Pt packing up her bags  Comments: PIV removed. Pt agrees with POC

## 2020-12-20 ENCOUNTER — PATIENT OUTREACH (OUTPATIENT)
Dept: CARE COORDINATION | Facility: CLINIC | Age: 30
End: 2020-12-20

## 2020-12-21 ENCOUNTER — PATIENT OUTREACH (OUTPATIENT)
Dept: CARE COORDINATION | Facility: CLINIC | Age: 30
End: 2020-12-21

## 2020-12-21 DIAGNOSIS — R19.7 DIARRHEA, UNSPECIFIED: Primary | ICD-10-CM

## 2020-12-21 LAB
C PARVUM AG STL QL IA: NEGATIVE
G LAMBLIA AG STL QL IA: NEGATIVE
SPECIMEN SOURCE: NORMAL

## 2020-12-21 ASSESSMENT — ACTIVITIES OF DAILY LIVING (ADL): DEPENDENT_IADLS:: INDEPENDENT

## 2020-12-21 NOTE — PROGRESS NOTES
Clinic Care Coordination Contact  [facility]/Voicemail    Referral Source: IP Report  Clinical Data: Care Coordinator Outreach.  Patient at [facility] 12/12-12/29/2020 for diarrhea unspecified.  Per these notes, Patient had recent treatment for diarrhea, had recent positive C-Diff, was seen by GI and had not yet picked up Vanco.  She presented in ED with worsening diarrhea.  GI will call to coordinate follow up appt with Patient.  SW involvement due to Patient in transition with housing and further assessment may be needed. Patient had been residing at [facility] with staff oversight and medication management.  Her  is supportive of discharge to friend's home, Patient's CADI services are in process and plan is for group home transition once in place.  Patient is followed by Ashlyn Fields, psychiatry, it was recommended by inpatient psychiatry that prn Olanzipine be added for daytime agitation due to PTSD related nightmares, Patient to have psychiatry appt on 12/22/2020    Outreach attempted x 1.  Left message on patient's voicemail with call back information and requested return call.  Plan: Care Coordinator will try to reach patient again in 1-2 business days.    Sydni Hawkins, NICOLW, MSW   [facility]  Care Coordination  [facility] Alexia Naj[facility]  [phone]  12/21/2020 9:11 AM

## 2020-12-21 NOTE — LETTER
[facility] CARE COORDINATION  [address]  [place] [address]    December 22, 2020    Ayana Prasad  [address] [id number]      Dear Ayana,    I am a clinic care coordinator who works with BROOKLYN Cruz CNP at [facility].  Below is a description of clinic care coordination and how I can further assist you.      The clinic care coordination team is made up of a registered nurse,  and community health worker who understand the health care system. The goal of clinic care coordination is to help you manage your health and improve access to the health care system in the most efficient manner. The team can assist you in meeting your health care goals by providing education, coordinating services, strengthening the communication among your providers and supporting you with any resource needs.    Please feel free to contact me at [phone] with any questions or concerns. We are focused on providing you with the highest-quality healthcare experience possible and that all starts with you.     Sincerely,     RAYMUNDO Augustin, MSW Clinic   [facility]  Care Coordination  [place] [facility]   [email or web address]  Office: [phone]  Employed by [employer]

## 2020-12-22 ENCOUNTER — TELEPHONE (OUTPATIENT)
Dept: GASTROENTEROLOGY | Facility: CLINIC | Age: 30
End: 2020-12-22

## 2020-12-22 ASSESSMENT — ACTIVITIES OF DAILY LIVING (ADL): DEPENDENT_IADLS:: INDEPENDENT

## 2020-12-22 NOTE — PROGRESS NOTES
Clinic Care Coordination Contact  [facility]/Voicemail    Referral Source: IP Report  Clinical Data: Care Coordinator Outreach  Outreach attempted x 2.  Left message on patient's voicemail with call back information and requested return call.  Plan: Care Coordinator sent care coordination introduction letter on today via [employer]. Care Coordinator will do no further outreaches at this time.    RAYMUNDO Augustin, MSW   [facility]  Care Coordination  [facility]  [phone]  12/22/2020 10:28 AM

## 2021-01-04 ENCOUNTER — VIRTUAL VISIT (OUTPATIENT)
Dept: GASTROENTEROLOGY | Facility: CLINIC | Age: 31
End: 2021-01-04
Payer: COMMERCIAL

## 2021-01-04 ENCOUNTER — OFFICE VISIT (OUTPATIENT)
Dept: URGENT CARE | Facility: URGENT CARE | Age: 31
End: 2021-01-04
Payer: COMMERCIAL

## 2021-01-04 VITALS
OXYGEN SATURATION: 97 % | HEART RATE: 100 BPM | SYSTOLIC BLOOD PRESSURE: 135 MMHG | TEMPERATURE: 99 F | DIASTOLIC BLOOD PRESSURE: 85 MMHG | RESPIRATION RATE: 18 BRPM

## 2021-01-04 VITALS — WEIGHT: 230 LBS | HEIGHT: 66 IN | BODY MASS INDEX: 36.96 KG/M2

## 2021-01-04 DIAGNOSIS — R19.5 LOOSE STOOLS: ICD-10-CM

## 2021-01-04 DIAGNOSIS — R05.9 COUGH: Primary | ICD-10-CM

## 2021-01-04 DIAGNOSIS — R11.0 NAUSEA: Primary | ICD-10-CM

## 2021-01-04 PROCEDURE — U0003 INFECTIOUS AGENT DETECTION BY NUCLEIC ACID (DNA OR RNA); SEVERE ACUTE RESPIRATORY SYNDROME CORONAVIRUS 2 (SARS-COV-2) (CORONAVIRUS DISEASE [COVID-19]), AMPLIFIED PROBE TECHNIQUE, MAKING USE OF HIGH THROUGHPUT TECHNOLOGIES AS DESCRIBED BY CMS-2020-01-R: HCPCS | Performed by: FAMILY MEDICINE

## 2021-01-04 PROCEDURE — U0005 INFEC AGEN DETEC AMPLI PROBE: HCPCS | Performed by: FAMILY MEDICINE

## 2021-01-04 PROCEDURE — 99212 OFFICE O/P EST SF 10 MIN: CPT | Mod: 95 | Performed by: PHYSICIAN ASSISTANT

## 2021-01-04 PROCEDURE — 99214 OFFICE O/P EST MOD 30 MIN: CPT | Performed by: FAMILY MEDICINE

## 2021-01-04 ASSESSMENT — MIFFLIN-ST. JEOR: SCORE: 1780.02

## 2021-01-04 NOTE — PROGRESS NOTES
GI CLINIC VISIT    CC/REFERRING MD:  Becki Avery  REASON FOR CONSULTATION: nausea, vomiting    ASSESSMENT/PLAN:  Ayana Prasad is a 30 year old woman with a past medical history of cauda equina syndrome, neurogenic bladder, cannabis abuse, opiate abuse disorder, ovarian cyst, nephrolithiasis, bipolar 1, schizoaffective disorder, BPD, depression, ADHD, anxiety, SI, TBI presenting for nausea, vomiting, loose stools     1. Nausea, vomiting, loose stools   Today the patient presents for follow-up to discuss nausea and vomiting and loose stools. Has had upper endoscopy previously, most recently in 2018 with subjectively normal esophagus, stomach and duodenum and unremarkable biopsies.  At her last visit, she reported that symptoms started with a change in stool consistency found to have a normal fecal calprotectin but elevated C. difficile.  She was treated for C. difficile, unclear if this significantly improved her symptoms.  Likely has C. difficile colonization.  Overall she reports that stools are going okay and that they are not bothersome, but they continue to be loose.  Discussed plan to use fiber and Imodium as needed as outlined below.      In regards to nausea, unlikely to be outlet obstruction given previous normal upper endoscopy. Has met with our pharmacist- has multiple medications could potentially cause nausea but no clear change in medication to trigger symptoms was identified. Should continue to discuss possible neurologic sources with her neurology team.  Given her report that she is vomiting food from meals before, would recommend that she undergo gastric emptying study.  Pending results may benefit from meeting with our GI dietitian.   -- gastric emptying study  --Small frequent meals throughout the day, keep up with your liquids   -- for stools- start fiber. Soluble fiber supplementation on a daily basis can help regulate the consistency of your stools. Metamucil, citrucel or benefiber  are all examples. You can start with 1 teaspoon per day and if tolerated, may increase up to 2-3 tablespoons per day. You may experience some bloating with initiation of fiber supplementation that will improve over the first month.  A good fiber trial to evaluate the effect is 3-6 months.  -- you can use low dose imodium as needed if fiber does not help (1 pill each morning)  --Use Zofran as needed (up to every 8 hours)     RTC 8 weeks    Thank you for this consultation.  It was a pleasure to participate in the care of this patient; please contact us with any further questions.     Note completed using voice recognition software. Some word and grammatical errors may occur.       Katiuska Viramontes PA-C  Division of Gastroenterology, Hepatology & Nutrition  [facility]        HPI  Ayana Prasad is a 30 year old woman with a past medical history of cauda equina syndrome, neurogenic bladder, cannabis abuse, opiate abuse disorder, ovarian cyst, nephrolithiasis, bipolar 1, schizoaffective disorder, BPD, depression, ADHD, anxiety, SI, TBI presenting for abdominal pain.  She is new to the [facility] and this is my first encounter with the patient.    She has previously been seen by [place] [facility], most recent visit visible with Dr. Saba 1/31/2019.  At this visit, she describes nausea, intermittent vomiting between 3-5 times a week.  Also reported 40 pound weight loss, diarrhea with 5-10 liquid bowel movements a day.  At this time had unremarkable labs with CBC, CMP, Giardia.  Did have positive C. difficile for which she was treated with vancomycin without improvement in her symptoms.  Had upper endoscopy December 2018 with normal esophagus, stomach and duodenum with normal biopsies.  She was started on fidaxomicin at that time.    More recently, patient has reported increased nausea and vomiting. Was seen by PCP with BMP and lithium level within normal limits.     Will last a long  "time and happen once or twice every other year.   For 1 month has been waking up sick to her stomach. This happened out of the blue. Has symptoms every day, severity can change throughout the day. Will wake up in the middle of the night nauseated, usually at 8:30 in the morning. Feels like she has been on a roller coaster ride- just ate. When she vomits, stomach acid will come up. No food in this. Throughout the day she will have mild stomach pain. Will be able to eat meals 50% of the time. No early satiety. Sometimes reflux- minor.     Describes mid abdominal pain. Dull pain. 4/10. Does not change with eating or having a bowel movement. Does not significantly change with bending or twisting, movement.     Bowel movements: \"smells like iron\" really strongly. This started around same time as other symptoms. 3-4 bowel movements (spread throughout the day). States this is like pudding. No melena or hematochezia. States baseline is soft, 1 time a day.     Takes zofran every day which helps her symptoms     Interval History 1/4/2020:  After the patient's last visit, we ordered stool studies which were notable for a positive C. difficile however negative fecal calprotectin.  Enteric panel within normal limits.  I was unable to reach the patient to discuss results.  She was admitted to the hospital 12/12/2020 for suicidal ideation.  Given loose stools, she was started on vancomycin.  This was escalated to 50 mg 4 times daily with addition of fidoxomicin 12/17.  Bowel movements- 3 watery bowel movement a day. No nocturnal bowel movements. No other bowel medications. No accidents, no urgency. She states the stools do not bother her.     She states that she is vomiting one time a day, mostly food coming up. This can happen from food before. Cheese can make this worse. No GERD.     She states she has been eating greens, berries. Will have monster, coffee, water. Feels like weight is stable.     Increased stomach noises. "     PROBLEM LIST  Patient Active Problem List    Diagnosis Date Noted     Aggressive behavior 12/13/2020     Priority: Medium     AVA (generalized anxiety disorder) 11/16/2020     Priority: Medium     Chronic bilateral low back pain without sciatica 01/17/2020     Priority: Medium     Obesity (BMI 35.0-39.9) with comorbidity (H) 12/18/2019     Priority: Medium     Urinary retention 06/29/2019     Priority: Medium     Spell of altered consciousness 06/27/2019     Priority: Medium     Bella (H) 06/04/2019     Priority: Medium     Suicidal ideation 04/09/2019     Priority: Medium     Nausea 02/25/2019     Priority: Medium     Cyst of left ovary 11/05/2018     Priority: Medium     Nephrolithiasis 08/29/2018     Priority: Medium     Class 2 obesity due to excess calories in adult 03/07/2018     Priority: Medium     Auditory hallucinations 02/26/2018     Priority: Medium     Anxiety 01/05/2018     Priority: Medium     Schizoaffective disorder, bipolar type (H) 06/30/2017     Priority: Medium     PTSD (post-traumatic stress disorder) 06/30/2017     Priority: Medium     Cauda equina syndrome with neurogenic bladder (H) 01/20/2017     Priority: Medium     Moderate episode of recurrent major depressive disorder (H) 01/17/2017     Priority: Medium     Borderline personality disorder (H) 12/27/2016     Priority: Medium     History of heroin abuse (H) 12/27/2016     Priority: Medium     Optic neuritis 03/04/2016     Priority: Medium     From recent dx of optic neuritis w/ vision loss, and iritis. Received infusions x 4 of solumedrol at [facility]. MRI done of head as well which was normal. Spinal tap looked ok per patient.         Intractable back pain 09/20/2015     Priority: Medium     Depression 02/14/2015     Priority: Medium     Overdose 02/14/2015     Priority: Medium     Overview:   Intentional overdose October 2016 and May 2016       Cannabis dependence (H) 08/22/2013     Priority: Medium     Episodic mood disorder  (H) 08/22/2013     Priority: Medium     Opioid use disorder, severe, dependence (H) 08/22/2013     Priority: Medium     Substance-induced psychotic disorder with hallucinations (H) 08/22/2013     Priority: Medium     GERD (gastroesophageal reflux disease) 07/08/2013     Priority: Medium     Tobacco abuse 07/08/2013     Priority: Medium     Marijuana abuse 05/31/2013     Priority: Medium     Daily.  Some use of MDMA and cocaine in past and recently had a 4 day break where she doesn't recall getting lost in woods.        Polysubstance abuse (H) 05/31/2013     Priority: Medium     Marijuana daily, Xanax periodically.  MDMA a couple times and cocaine. Narcotics and over the counter. Dextromethorphan with overdose 5/1/16 at [facility].  CD recommended.        Bipolar 1 disorder, manic, mild 01/13/2012     Priority: Medium     Close to meeting criteria for bipolar 1? Reji Dey.  Psychology feels bipolar may be appropriate diagnosis although subsequent evaluation by psychiatry at [facility] felt depression with borderline personality.  Will return for med discussion with preference for Lithium 300 two times daily with goal 12 hour trough 0.8-1.2.  March 26, 2012 - intitiated Li and seemed to help some but stopped due to shakiness.  Lamotrigine trial as having more depression issues 25/d, up to two times daily then gradually increase to 100-200 mg daily.  Consider olazapine for thought disturbance. Has not wanted medications but used friends Xanax.  Prozac plus olazapine good next option once GI issues worked through. Patient very resistent to medications.  Continue with Reji.   May 31, 2013 - patient likely in a hypomanic/manic phase right now but no signs or symptoms of dangerous behaviors or thought processes.  Trial of olanzapine and fluoxetine. Didn't like olanzapine. Stopped as inpt for non-suicidal overdose at [facility]. Pyschiatry, psychology and continue with Prozac.  Now agreeing to take prozac and seroquel. Stopped Prozac on  her own because didn't notice difference.  Trazodone didn't help. Stopped all. Possible haven?  Restart quetiapine for minor hallucinations.        ADHD (attention deficit hyperactivity disorder) 09/09/2011     Priority: Medium     Adderall ineffective.  Better on Concerta.  Still with anger issues predating medications. Declines counseling. Suggested vocational assessment.  Trial of guanfacine adjunct for anger but didn't help. Would avoid controlled substances given CD issues and impulsivity.       Abdominal pain, right upper quadrant 11/16/2010     Priority: Medium       PERTINENT PAST MEDICAL HISTORY:  Past Medical History:   Diagnosis Date     ADHD (attention deficit hyperactivity disorder)      Bipolar 1 disorder      Borderline personality disorder      Cauda equina syndrome      Chronic low back pain      Depression      Depressive disorder      GERD (gastroesophageal reflux disease)      h/o TBI (traumatic brain injury)      Marginal corneal ulcer, left 7/17/2015     Nephrolithiasis      Polysubstance abuse - methamphetamine, hallucinagen, heroin, marijuana     currently in remission     PONV (postoperative nausea and vomiting)      PTSD (post-traumatic stress disorder)        PREVIOUS SURGERIES:  Cholecystectomy in 2016   Past Surgical History:   Procedure Laterality Date     BACK SURGERY - For Cauda Equina Syndrome  12/24/2016     COLONOSCOPY       COMBINED CYSTOSCOPY, INSERT STENT URETER(S) Left 8/30/2018    Procedure: COMBINED CYSTOSCOPY, INSERT STENT URETER(S);  Cystoscopy With Left Stent Placement;  Surgeon: Kiran Ulrich MD;  Location: [facility]     COMBINED CYSTOSCOPY, RETROGRADES, EXCHANGE STENT URETER(S) Left 10/14/2018    Procedure: COMBINED CYSTOSCOPY, RETROGRADES, EXCHANGE STENT URETER(S);  Cystoscopy and removal of left-sided stent.;  Surgeon: Stiven Olivo MD;  Location:  OR     COMBINED CYSTOSCOPY, RETROGRADES, URETEROSCOPY, INSERT STENT  1/6/2014    Procedure: COMBINED  CYSTOSCOPY, RETROGRADES, URETEROSCOPY, INSERT STENT;  Cystyoscopy place left ureteral stent;  Surgeon: Jaun Kimble MD;  Location: [facility] OR     COMBINED CYSTOSCOPY, RETROGRADES, URETEROSCOPY, INSERT STENT Left 10/23/2018    Procedure: Video cystoscopy, left ureteroscopy with stone extraction;  Surgeon: Bull Mast MD;  Location:  OR     CYSTOSCOPY, URETEROSCOPY, COMBINED Right 9/23/2015    Procedure: COMBINED CYSTOSCOPY, URETEROSCOPY;  Surgeon: ROME Jett MD;  Location: [facility] OR     ENT SURGERY       ESOPHAGOSCOPY, GASTROSCOPY, DUODENOSCOPY (EGD), COMBINED  4/8/2013    Procedure: COMBINED ESOPHAGOSCOPY, GASTROSCOPY, DUODENOSCOPY (EGD), BIOPSY SINGLE OR MULTIPLE;  Gastroscopy;  Surgeon: Peter Pickard MD;  Location: [facility] GI     ESOPHAGOSCOPY, GASTROSCOPY, DUODENOSCOPY (EGD), COMBINED Left 10/28/2014    Procedure: COMBINED ESOPHAGOSCOPY, GASTROSCOPY, DUODENOSCOPY (EGD), BIOPSY SINGLE OR MULTIPLE;  Surgeon: Narcisa Ramirez MD;  Location:  OR     ESOPHAGOSCOPY, GASTROSCOPY, DUODENOSCOPY (EGD), COMBINED N/A 12/24/2018    Procedure: COMBINED ESOPHAGOSCOPY, GASTROSCOPY, DUODENOSCOPY (EGD), BIOPSY SINGLE OR MULTIPLE;  Surgeon: Sonu Verduzco MD;  Location: [facility]     LAPAROSCOPIC CHOLECYSTECTOMY  11/20/2014    [facility], Dr. Ramirez     LASER HOLMIUM LITHOTRIPSY URETER(S), INSERT STENT, COMBINED  4/2/2014    Procedure: COMBINED CYSTOSCOPY, URETEROSCOPY, LASER HOLMIUM LITHOTRIPSY URETER(S), INSERT STENT;  Cystoscopy,Left Ureteral Stent Removal,Left Ureteroscopy with Laser Lithotripsy,Left Ureter Stent Placement;  Surgeon: ROME Jett MD;  Location: [facility] OR     Transurethral stone resection  03/11/2011       PREVIOUS ENDOSCOPY:  EGD  2014:  Impression:               - No gross lesions in duodenum. Biopsied.                             - Erythematous mucosa in the stomach. Biopsied.                             - Non-bleeding erosive gastropathy. Biopsied.                             - Normal  esophagus. Biopsied.   A. Stomach, antrum, biopsy:   - Gastric antral-type mucosa with no pathologic changes.   - No Helicobacter pylori identified.     B. Stomach, biopsy:   - Gastric body-type mucosa with focal minimal chronic inflammation,   nonspecific   - No evidence of active inflammation, intestinal metaplasia, dysplasia or   malignancy   - No Helicobacter pylori identified.     C. Esophagus, random, biopsy:   - Stratified squamous mucosa with no pathologic changes.   - No glandular epithelium seen.     ALLERGIES:     Allergies   Allergen Reactions     Haldol [Haloperidol] Other (See Comments)     Makes patient very angry and anxious     Adhesive Tape Hives     Percocet [Oxycodone-Acetaminophen] Nausea and Vomiting     Prednisone Other (See Comments) and Hives     Suicidal ideation     Risperidone Other (See Comments)     Tramadol Hcl Nausea and Vomiting     Droperidol Anxiety     Seroquel [Quetiapine] Palpitations     Spent 2 weeks in the hospital due to having seroquel, caused palpitations and QT prolongation       PERTINENT MEDICATIONS:    Current Outpatient Medications:      ALMACONE -400-40 MG/5ML SUSP suspension, TAKE 30MLS BY MOUTH EVERY 4 HOURS AS NEEDED FOR INDIGESTION, Disp: 355 mL, Rfl: 0     cyproheptadine (PERIACTIN) 4 MG tablet, Take 1 tablet (4 mg) by mouth At Bedtime, Disp: 30 tablet, Rfl: 1     fluPHENAZine decanoate (PROLIXIN) 25 MG/ML injection, INJECT 1ML (25MG) INTRAMUSCULARLY EVERY 14 DAYS, Disp: 6 mL, Rfl: 0     gabapentin (NEURONTIN) 300 MG capsule, TAKE 3 CAPSULES (900MG) AND TAKE 4 CAPSULES (1200MG ) BY MOUTH MIDDAY DAY AND TAKE 3 CAPSULES (900MG) BY MOUTH EVERY EVENING, Disp: 300 capsule, Rfl: 11     ibuprofen (ADVIL/MOTRIN) 800 MG tablet, Take 1 tablet (800 mg) by mouth every 8 hours as needed for moderate pain, fever or pain Take with food, Disp: 60 tablet, Rfl: 1     lisdexamfetamine (VYVANSE) 40 MG capsule, Take 40 mg by mouth every morning , Disp: , Rfl:      lithium ER  (LITHOBID) 300 MG CR tablet, Take one tab (1) each morning and three (3) tabs at bedtime, Disp: 120 tablet, Rfl: 0     norelgestromin-ethinyl estradiol (XULANE) 150-35 MCG/24HR patch, Place 1 patch onto the skin once a week May use continuously, Disp: 12 patch, Rfl: 4     OLANZapine (ZYPREXA) 2.5 MG tablet, Take 1 tablet (2.5 mg) by mouth 3 times daily as needed (for anxiety or increased voices), Disp: 30 tablet, Rfl: 1     omeprazole (PRILOSEC) 20 MG DR capsule, TAKE 1 CAPSULE BY MOUTH DAILY, Disp: 30 capsule, Rfl: 3     ondansetron (ZOFRAN-ODT) 4 MG ODT tab, Take 1 tablet (4 mg) by mouth every 8 hours as needed for nausea, Disp: 60 tablet, Rfl: 0     psyllium (METAMUCIL/KONSYL) capsule, Take 1 capsule by mouth daily, Disp: 90 capsule, Rfl: 3     QUEtiapine (SEROQUEL) 100 MG tablet, Take 1 tablet (100 mg) by mouth At Bedtime, Disp: 30 tablet, Rfl: 0  Ibuprofen- twice a day every day, started about a week ago. Takes this for her ear piercing infection     SOCIAL HISTORY:  No alcohol, smokes cigarettes (1/2 pack a day), No THC, no opioid medications   Social History     Socioeconomic History     Marital status:      Spouse name: Not on file     Number of children: 0     Years of education: Not on file     Highest education level: Not on file   Occupational History     Employer: [employer]   Social Needs     Financial resource strain: Not on file     Food insecurity     Worry: Not on file     Inability: Not on file     Transportation needs     Medical: Not on file     Non-medical: Not on file   Tobacco Use     Smoking status: Current Every Day Smoker     Packs/day: 1.00     Years: 5.00     Pack years: 5.00     Types: Dip, chew, snus or snuff, Other     Start date: 8/1/2011     Smokeless tobacco: Current User     Types: Chew     Last attempt to quit: 12/17/2019   Substance and Sexual Activity     Alcohol use: No     Alcohol/week: 0.0 standard drinks     Drug use: No     Types: Opiates     Comment: oxy,  heroin (smoke)     Sexual activity: Yes     Partners: Female     Birth control/protection: None   Lifestyle     Physical activity     Days per week: Not on file     Minutes per session: Not on file     Stress: Not on file   Relationships     Social connections     Talks on phone: Not on file     Gets together: Not on file     Attends Advent service: Not on file     Active member of club or organization: Not on file     Attends meetings of clubs or organizations: Not on file     Relationship status: Not on file     Intimate partner violence     Fear of current or ex partner: Not on file     Emotionally abused: Not on file     Physically abused: Not on file     Forced sexual activity: Not on file   Other Topics Concern     Parent/sibling w/ CABG, MI or angioplasty before 65F 55M? No   Social History Narrative    Originally from [place] has an abuse history completed school on time currently has a bachelor's degree from college.  Has a wife and 3 children lives with them in a home.  No  history no access to guns or weapons enjoys fishing.       FAMILY HISTORY:  FH of CRC: half brother with esophageal cancer (48-49)   FH of IBD: Crohn's disease in father, cousin   No autoimmune or other cancers   Family History   Problem Relation Age of Onset     Gastrointestinal Disease Father         Crohn's Disease     Cancer Father         Liver Cancer     Other Cancer Father         Liver     Cancer Maternal Grandmother         lympoma     Diabetes Maternal Grandmother      Mental Illness Maternal Grandmother      Other Cancer Maternal Grandmother         Non Hodgkins Lymphoma     Diabetes Maternal Grandfather      Hypertension Maternal Grandfather      Prostate Cancer Maternal Grandfather      Hyperlipidemia Maternal Grandfather      Heart Disease Paternal Grandfather         heart disease     Hypertension Brother      Other Cancer Brother         Esophagecial     Diabetes Brother      Hyperlipidemia Mother      Mental  Illness Mother      Anxiety Disorder Mother      Anesthesia Reaction Mother         Vomiting/Nausea     Hypertension Brother      Other Cancer Brother      Other Cancer Paternal Half-Brother         Esophageal       Past/family/social history reviewed and no changes    PHYSICAL EXAMINATION:  General appearance: Healthy appearing adult, in no acute distress  Eyes: Sclera anicteric, no erythema   Ears, nose, mouth and throat: No obvious external lesions of ears and nose, Hearing intact  Neck: Symmetric, No obvious external lesions  Respiratory: Normal respiration, no use of accessory muscles   Skin: No rashes or jaundice   Psychiatric: Oriented to person, place and time, Appropriate mood and affect.     PERTINENT STUDIES:    Orders Only on 11/13/2020   Component Date Value Ref Range Status     Sodium 11/13/2020 137  133 - 144 mmol/L Final     Potassium 11/13/2020 4.1  3.4 - 5.3 mmol/L Final     Chloride 11/13/2020 108  94 - 109 mmol/L Final     Carbon Dioxide 11/13/2020 24  20 - 32 mmol/L Final     Anion Gap 11/13/2020 5  3 - 14 mmol/L Final     Glucose 11/13/2020 114* 70 - 99 mg/dL Final     Urea Nitrogen 11/13/2020 14  7 - 30 mg/dL Final     Creatinine 11/13/2020 0.65  0.52 - 1.04 mg/dL Final     GFR Estimate 11/13/2020 >90  >60 mL/min/[1.73_m2] Final     GFR Estimate If Black 11/13/2020 >90  >60 mL/min/[1.73_m2] Final     Calcium 11/13/2020 9.5  8.5 - 10.1 mg/dL Final     Lithium Level 11/13/2020 0.86  0.60 - 1.20 mmol/L Final     Color Urine 11/13/2020 Yellow   Final     Appearance Urine 11/13/2020 Slightly Cloudy   Final     Glucose Urine 11/13/2020 Negative  NEG^Negative mg/dL Final     Bilirubin Urine 11/13/2020 Negative  NEG^Negative Final     Ketones Urine 11/13/2020 Negative  NEG^Negative mg/dL Final     Specific Gravity Urine 11/13/2020 1.015  1.003 - 1.035 Final     Blood Urine 11/13/2020 Trace* NEG^Negative Final     pH Urine 11/13/2020 6.5  5.0 - 7.0 pH Final     Protein Albumin Urine 11/13/2020 Negative   NEG^Negative mg/dL Final     Urobilinogen Urine 11/13/2020 0.2  0.2 - 1.0 EU/dL Final     Nitrite Urine 11/13/2020 Negative  NEG^Negative Final     Leukocyte Esterase Urine 11/13/2020 Trace* NEG^Negative Final     Source 11/13/2020 Midstream Urine   Final     WBC Urine 11/13/2020 0 - 5  OTO5^0 - 5 /HPF Final     RBC Urine 11/13/2020 2-5* OTO2^O - 2 /HPF Final     Squamous Epithelial /LPF Urine 11/13/2020 Moderate* FEW^Few /LPF Final     MRI brain: 10/2020  IMPRESSION:  1. No significant change in several nonspecific punctate/patchy  cerebral white matter signal abnormalities, with unchanged  differential diagnosis (as was delineated on the previous MRI brain  report). In a patient of this age, primary considerations would  include mild early chronic small vessel ischemic changes,  demyelinating disease, vascular headaches, or sequela of a remote  nonspecific previous insult. No new, enhancing, or diffusion  restricting lesions to suggest active disease. Clinical correlation is  recommended.  2. Otherwise unremarkable brain MRI.

## 2021-01-04 NOTE — PATIENT INSTRUCTIONS
It was a pleasure taking care of you today.  I've included a brief summary of our discussion and care plan from today's visit below.  Please review this information with your primary care provider.  ______________________________________________________________________    My recommendations are summarized as follows:    -- gastric emptying study  --Small frequent meals throughout the day, keep up with your liquids   -- for stools- start fiber. Soluble fiber supplementation on a daily basis can help regulate the consistency of your stools. Metamucil, citrucel or benefiber are all examples. You can start with 1 teaspoon per day and if tolerated, may increase up to 2-3 tablespoons per day. You may experience some bloating with initiation of fiber supplementation that will improve over the first month.  A good fiber trial to evaluate the effect is 3-6 months.  -- you can use low dose imodium as needed if fiber does not help (1 pill each morning)  --Use Zofran as needed (up to every 8 hours)      Return to GI Clinic in 2 months to review your progress.    ______________________________________________________________________    How do I schedule labs, imaging studies, or procedures that were ordered in clinic today?   Labs: To schedule lab appointment [facility], use my chart or call [phone]. If you have a [facility] lab closer to home where you are regularly seen you can give them a call.     Procedures: If a colonoscopy, upper endoscopy, breath test, esophageal manometry, or pH impedence was ordered today, our endoscopy team will call you to schedule this. If you have not heard from our endoscopy team within a week, please call [phone] to schedule.     Imaging Studies: If you were scheduled for a CT scan, X-ray, MRI, ultrasound, HIDA scan or other imaging study, please call [phone] to have this scheduled.     Referral: If a referral to another specialty was ordered, expect a phone  call or follow instructions above. If you have not heard from anyone regarding your referral in a week, please call our clinic to check the status.     Who do I call with any questions after my visit?  Please be in touch if there are any further questions that arise following today's visit.  There are multiple ways to contact your gastroenterology care team.        During business hours, you may reach a Gastroenterology nurse at [phone]      To schedule or reschedule an appointment, please call [phone].       You can always send a secure message through [employer].  [employer] messages are answered by your nurse or doctor typically within 24 hours.  Please allow extra time on weekends and holidays.        For urgent/emergent questions after business hours, you may reach the on-call GI Fellow by contacting the [facility]  at [phone].     How will I get the results of any tests ordered?    You will receive all of your results.  If you have signed up for [employer], any tests ordered at your visit will be available to you after your physician reviews them.  Typically this takes 1-2 weeks.  If there are urgent results that require a change in your care plan, your physician or nurse will call you to discuss the next steps.      What is [employer]?  [employer] is a secure way for you to access all of your healthcare records from the [facility].  It is a web based computer program, so you can sign on to it from any location.  It also allows you to send secure messages to your care team.  I recommend signing up for BinW[employer] access if you have not already done so and are comfortable with using a computer.      How to I schedule a follow-up visit?  If you did not schedule a follow-up visit today, please call [phone] to schedule a follow-up office visit.      Sincerely,    Katiuska Viramontes PA-C  Division of Gastroenterology, Hepatology & Nutrition  [facility]

## 2021-01-04 NOTE — PROGRESS NOTES
Ayana Prasad is a 30 year old female who is being evaluated via a billable telephone visit.      What phone number would you like to be contacted at? [phone]  How would you like to obtain your AVS? Regina    Phone call duration: 10 minutes

## 2021-01-04 NOTE — LETTER
1/4/2021         RE: Ayana Prasad  [address] [id number]        Dear Colleague,    Thank you for referring your patient, Ayana Prasad, to the [facility] [place]. Please see a copy of my visit note below.    GI CLINIC VISIT    CC/REFERRING MD:  Becki Avery  REASON FOR CONSULTATION: nausea, vomiting    ASSESSMENT/PLAN:  Ayana Prasad is a 30 year old woman with a past medical history of cauda equina syndrome, neurogenic bladder, cannabis abuse, opiate abuse disorder, ovarian cyst, nephrolithiasis, bipolar 1, schizoaffective disorder, BPD, depression, ADHD, anxiety, SI, TBI presenting for nausea, vomiting, loose stools     1. Nausea, vomiting, loose stools   Today the patient presents for follow-up to discuss nausea and vomiting and loose stools. Has had upper endoscopy previously, most recently in 2018 with subjectively normal esophagus, stomach and duodenum and unremarkable biopsies.  At her last visit, she reported that symptoms started with a change in stool consistency found to have a normal fecal calprotectin but elevated C. difficile.  She was treated for C. difficile, unclear if this significantly improved her symptoms.  Likely has C. difficile colonization.  Overall she reports that stools are going okay and that they are not bothersome, but they continue to be loose.  Discussed plan to use fiber and Imodium as needed as outlined below.      In regards to nausea, unlikely to be outlet obstruction given previous normal upper endoscopy. Has met with our pharmacist- has multiple medications could potentially cause nausea but no clear change in medication to trigger symptoms was identified. Should continue to discuss possible neurologic sources with her neurology team.  Given her report that she is vomiting food from meals before, would recommend that she undergo gastric emptying study.  Pending results may benefit from meeting with our GI  dietitian.   -- gastric emptying study  --Small frequent meals throughout the day, keep up with your liquids   -- for stools- start fiber. Soluble fiber supplementation on a daily basis can help regulate the consistency of your stools. Metamucil, citrucel or benefiber are all examples. You can start with 1 teaspoon per day and if tolerated, may increase up to 2-3 tablespoons per day. You may experience some bloating with initiation of fiber supplementation that will improve over the first month.  A good fiber trial to evaluate the effect is 3-6 months.  -- you can use low dose imodium as needed if fiber does not help (1 pill each morning)  --Use Zofran as needed (up to every 8 hours)     RTC 8 weeks    Thank you for this consultation.  It was a pleasure to participate in the care of this patient; please contact us with any further questions.     Note completed using voice recognition software. Some word and grammatical errors may occur.       Katiuska Viramontes PA-C  Division of Gastroenterology, Hepatology & Nutrition  [facility]        HPI  Ayana Prasad is a 30 year old woman with a past medical history of cauda equina syndrome, neurogenic bladder, cannabis abuse, opiate abuse disorder, ovarian cyst, nephrolithiasis, bipolar 1, schizoaffective disorder, BPD, depression, ADHD, anxiety, SI, TBI presenting for abdominal pain.  She is new to the [facility] clinic and this is my first encounter with the patient.    She has previously been seen by [place] [facility], most recent visit visible with Dr. Saba 1/31/2019.  At this visit, she describes nausea, intermittent vomiting between 3-5 times a week.  Also reported 40 pound weight loss, diarrhea with 5-10 liquid bowel movements a day.  At this time had unremarkable labs with CBC, CMP, Giardia.  Did have positive C. difficile for which she was treated with vancomycin without improvement in her symptoms.  Had upper endoscopy December  "2018 with normal esophagus, stomach and duodenum with normal biopsies.  She was started on fidaxomicin at that time.    More recently, patient has reported increased nausea and vomiting. Was seen by PCP with BMP and lithium level within normal limits.     Will last a long time and happen once or twice every other year.   For 1 month has been waking up sick to her stomach. This happened out of the blue. Has symptoms every day, severity can change throughout the day. Will wake up in the middle of the night nauseated, usually at 8:30 in the morning. Feels like she has been on a roller coaster ride- just ate. When she vomits, stomach acid will come up. No food in this. Throughout the day she will have mild stomach pain. Will be able to eat meals 50% of the time. No early satiety. Sometimes reflux- minor.     Describes mid abdominal pain. Dull pain. 4/10. Does not change with eating or having a bowel movement. Does not significantly change with bending or twisting, movement.     Bowel movements: \"smells like iron\" really strongly. This started around same time as other symptoms. 3-4 bowel movements (spread throughout the day). States this is like pudding. No melena or hematochezia. States baseline is soft, 1 time a day.     Takes zofran every day which helps her symptoms     Interval History 1/4/2020:  After the patient's last visit, we ordered stool studies which were notable for a positive C. difficile however negative fecal calprotectin.  Enteric panel within normal limits.  I was unable to reach the patient to discuss results.  She was admitted to the hospital 12/12/2020 for suicidal ideation.  Given loose stools, she was started on vancomycin.  This was escalated to 50 mg 4 times daily with addition of fidoxomicin 12/17.  Bowel movements- 3 watery bowel movement a day. No nocturnal bowel movements. No other bowel medications. No accidents, no urgency. She states the stools do not bother her.     She states that she " is vomiting one time a day, mostly food coming up. This can happen from food before. Cheese can make this worse. No GERD.     She states she has been eating greens, berries. Will have monster, coffee, water. Feels like weight is stable.     Increased stomach noises.     PROBLEM LIST  Patient Active Problem List    Diagnosis Date Noted     Aggressive behavior 12/13/2020     Priority: Medium     AVA (generalized anxiety disorder) 11/16/2020     Priority: Medium     Chronic bilateral low back pain without sciatica 01/17/2020     Priority: Medium     Obesity (BMI 35.0-39.9) with comorbidity (H) 12/18/2019     Priority: Medium     Urinary retention 06/29/2019     Priority: Medium     Spell of altered consciousness 06/27/2019     Priority: Medium     Bella (H) 06/04/2019     Priority: Medium     Suicidal ideation 04/09/2019     Priority: Medium     Nausea 02/25/2019     Priority: Medium     Cyst of left ovary 11/05/2018     Priority: Medium     Nephrolithiasis 08/29/2018     Priority: Medium     Class 2 obesity due to excess calories in adult 03/07/2018     Priority: Medium     Auditory hallucinations 02/26/2018     Priority: Medium     Anxiety 01/05/2018     Priority: Medium     Schizoaffective disorder, bipolar type (H) 06/30/2017     Priority: Medium     PTSD (post-traumatic stress disorder) 06/30/2017     Priority: Medium     Cauda equina syndrome with neurogenic bladder (H) 01/20/2017     Priority: Medium     Moderate episode of recurrent major depressive disorder (H) 01/17/2017     Priority: Medium     Borderline personality disorder (H) 12/27/2016     Priority: Medium     History of heroin abuse (H) 12/27/2016     Priority: Medium     Optic neuritis 03/04/2016     Priority: Medium     From recent dx of optic neuritis w/ vision loss, and iritis. Received infusions x 4 of solumedrol at [facility]. MRI done of head as well which was normal. Spinal tap looked ok per patient.         Intractable back pain  09/20/2015     Priority: Medium     Depression 02/14/2015     Priority: Medium     Overdose 02/14/2015     Priority: Medium     Overview:   Intentional overdose October 2016 and May 2016       Cannabis dependence (H) 08/22/2013     Priority: Medium     Episodic mood disorder (H) 08/22/2013     Priority: Medium     Opioid use disorder, severe, dependence (H) 08/22/2013     Priority: Medium     Substance-induced psychotic disorder with hallucinations (H) 08/22/2013     Priority: Medium     GERD (gastroesophageal reflux disease) 07/08/2013     Priority: Medium     Tobacco abuse 07/08/2013     Priority: Medium     Marijuana abuse 05/31/2013     Priority: Medium     Daily.  Some use of MDMA and cocaine in past and recently had a 4 day break where she doesn't recall getting lost in woods.        Polysubstance abuse (H) 05/31/2013     Priority: Medium     Marijuana daily, Xanax periodically.  MDMA a couple times and cocaine. Narcotics and over the counter. Dextromethorphan with overdose 5/1/16 at [facility].  CD recommended.        Bipolar 1 disorder, manic, mild 01/13/2012     Priority: Medium     Close to meeting criteria for bipolar 1? Reji Dey.  Psychology feels bipolar may be appropriate diagnosis although subsequent evaluation by psychiatry at [place] felt depression with borderline personality.  Will return for med discussion with preference for Lithium 300 two times daily with goal 12 hour trough 0.8-1.2.  March 26, 2012 - intitiated Li and seemed to help some but stopped due to shakiness.  Lamotrigine trial as having more depression issues 25/d, up to two times daily then gradually increase to 100-200 mg daily.  Consider olazapine for thought disturbance. Has not wanted medications but used friends Xanax.  Prozac plus olazapine good next option once GI issues worked through. Patient very resistent to medications.  Continue with Reji.   May 31, 2013 - patient likely in a hypomanic/manic phase right now but no signs  or symptoms of dangerous behaviors or thought processes.  Trial of olanzapine and fluoxetine. Didn't like olanzapine. Stopped as inpt for non-suicidal overdose at [facility]. Pyschiatry, psychology and continue with Prozac.  Now agreeing to take prozac and seroquel. Stopped Prozac on her own because didn't notice difference.  Trazodone didn't help. Stopped all. Possible haven?  Restart quetiapine for minor hallucinations.        ADHD (attention deficit hyperactivity disorder) 09/09/2011     Priority: Medium     Adderall ineffective.  Better on Concerta.  Still with anger issues predating medications. Declines counseling. Suggested vocational assessment.  Trial of guanfacine adjunct for anger but didn't help. Would avoid controlled substances given CD issues and impulsivity.       Abdominal pain, right upper quadrant 11/16/2010     Priority: Medium       PERTINENT PAST MEDICAL HISTORY:  Past Medical History:   Diagnosis Date     ADHD (attention deficit hyperactivity disorder)      Bipolar 1 disorder      Borderline personality disorder      Cauda equina syndrome      Chronic low back pain      Depression      Depressive disorder      GERD (gastroesophageal reflux disease)      h/o TBI (traumatic brain injury)      Marginal corneal ulcer, left 7/17/2015     Nephrolithiasis      Polysubstance abuse - methamphetamine, hallucinagen, heroin, marijuana     currently in remission     PONV (postoperative nausea and vomiting)      PTSD (post-traumatic stress disorder)        PREVIOUS SURGERIES:  Cholecystectomy in 2016   Past Surgical History:   Procedure Laterality Date     BACK SURGERY - For Cauda Equina Syndrome  12/24/2016     COLONOSCOPY       COMBINED CYSTOSCOPY, INSERT STENT URETER(S) Left 8/30/2018    Procedure: COMBINED CYSTOSCOPY, INSERT STENT URETER(S);  Cystoscopy With Left Stent Placement;  Surgeon: Kiran Ulrich MD;  Location: [facility] OR     COMBINED CYSTOSCOPY, RETROGRADES, EXCHANGE STENT URETER(S) Left  10/14/2018    Procedure: COMBINED CYSTOSCOPY, RETROGRADES, EXCHANGE STENT URETER(S);  Cystoscopy and removal of left-sided stent.;  Surgeon: Stiven Olivo MD;  Location:  OR     COMBINED CYSTOSCOPY, RETROGRADES, URETEROSCOPY, INSERT STENT  1/6/2014    Procedure: COMBINED CYSTOSCOPY, RETROGRADES, URETEROSCOPY, INSERT STENT;  Cystyoscopy place left ureteral stent;  Surgeon: Jaun Kimble MD;  Location: [facility] OR     COMBINED CYSTOSCOPY, RETROGRADES, URETEROSCOPY, INSERT STENT Left 10/23/2018    Procedure: Video cystoscopy, left ureteroscopy with stone extraction;  Surgeon: Bull Mast MD;  Location:  OR     CYSTOSCOPY, URETEROSCOPY, COMBINED Right 9/23/2015    Procedure: COMBINED CYSTOSCOPY, URETEROSCOPY;  Surgeon: ROME Jett MD;  Location: [facility] OR     ENT SURGERY       ESOPHAGOSCOPY, GASTROSCOPY, DUODENOSCOPY (EGD), COMBINED  4/8/2013    Procedure: COMBINED ESOPHAGOSCOPY, GASTROSCOPY, DUODENOSCOPY (EGD), BIOPSY SINGLE OR MULTIPLE;  Gastroscopy;  Surgeon: Peter Pickard MD;  Location: [facility] GI     ESOPHAGOSCOPY, GASTROSCOPY, DUODENOSCOPY (EGD), COMBINED Left 10/28/2014    Procedure: COMBINED ESOPHAGOSCOPY, GASTROSCOPY, DUODENOSCOPY (EGD), BIOPSY SINGLE OR MULTIPLE;  Surgeon: Narcisa Ramirez MD;  Location:  OR     ESOPHAGOSCOPY, GASTROSCOPY, DUODENOSCOPY (EGD), COMBINED N/A 12/24/2018    Procedure: COMBINED ESOPHAGOSCOPY, GASTROSCOPY, DUODENOSCOPY (EGD), BIOPSY SINGLE OR MULTIPLE;  Surgeon: Sonu Verduzco MD;  Location: [facility] GI     LAPAROSCOPIC CHOLECYSTECTOMY  11/20/2014    [facility], Dr. Ramirez     LASER HOLMIUM LITHOTRIPSY URETER(S), INSERT STENT, COMBINED  4/2/2014    Procedure: COMBINED CYSTOSCOPY, URETEROSCOPY, LASER HOLMIUM LITHOTRIPSY URETER(S), INSERT STENT;  Cystoscopy,Left Ureteral Stent Removal,Left Ureteroscopy with Laser Lithotripsy,Left Ureter Stent Placement;  Surgeon: ROME Jett MD;  Location: [facility] OR     Transurethral stone resection  03/11/2011        PREVIOUS ENDOSCOPY:  EGD  2014:  Impression:               - No gross lesions in duodenum. Biopsied.                             - Erythematous mucosa in the stomach. Biopsied.                             - Non-bleeding erosive gastropathy. Biopsied.                             - Normal esophagus. Biopsied.   A. Stomach, antrum, biopsy:   - Gastric antral-type mucosa with no pathologic changes.   - No Helicobacter pylori identified.     B. Stomach, biopsy:   - Gastric body-type mucosa with focal minimal chronic inflammation,   nonspecific   - No evidence of active inflammation, intestinal metaplasia, dysplasia or   malignancy   - No Helicobacter pylori identified.     C. Esophagus, random, biopsy:   - Stratified squamous mucosa with no pathologic changes.   - No glandular epithelium seen.     ALLERGIES:     Allergies   Allergen Reactions     Haldol [Haloperidol] Other (See Comments)     Makes patient very angry and anxious     Adhesive Tape Hives     Percocet [Oxycodone-Acetaminophen] Nausea and Vomiting     Prednisone Other (See Comments) and Hives     Suicidal ideation     Risperidone Other (See Comments)     Tramadol Hcl Nausea and Vomiting     Droperidol Anxiety     Seroquel [Quetiapine] Palpitations     Spent 2 weeks in the hospital due to having seroquel, caused palpitations and QT prolongation       PERTINENT MEDICATIONS:    Current Outpatient Medications:      ALMACONE -400-40 MG/5ML SUSP suspension, TAKE 30MLS BY MOUTH EVERY 4 HOURS AS NEEDED FOR INDIGESTION, Disp: 355 mL, Rfl: 0     cyproheptadine (PERIACTIN) 4 MG tablet, Take 1 tablet (4 mg) by mouth At Bedtime, Disp: 30 tablet, Rfl: 1     fluPHENAZine decanoate (PROLIXIN) 25 MG/ML injection, INJECT 1ML (25MG) INTRAMUSCULARLY EVERY 14 DAYS, Disp: 6 mL, Rfl: 0     gabapentin (NEURONTIN) 300 MG capsule, TAKE 3 CAPSULES (900MG) AND TAKE 4 CAPSULES (1200MG ) BY MOUTH MIDDAY DAY AND TAKE 3 CAPSULES (900MG) BY MOUTH EVERY EVENING, Disp: 300 capsule,  Rfl: 11     ibuprofen (ADVIL/MOTRIN) 800 MG tablet, Take 1 tablet (800 mg) by mouth every 8 hours as needed for moderate pain, fever or pain Take with food, Disp: 60 tablet, Rfl: 1     lisdexamfetamine (VYVANSE) 40 MG capsule, Take 40 mg by mouth every morning , Disp: , Rfl:      lithium ER (LITHOBID) 300 MG CR tablet, Take one tab (1) each morning and three (3) tabs at bedtime, Disp: 120 tablet, Rfl: 0     norelgestromin-ethinyl estradiol (XULANE) 150-35 MCG/24HR patch, Place 1 patch onto the skin once a week May use continuously, Disp: 12 patch, Rfl: 4     OLANZapine (ZYPREXA) 2.5 MG tablet, Take 1 tablet (2.5 mg) by mouth 3 times daily as needed (for anxiety or increased voices), Disp: 30 tablet, Rfl: 1     omeprazole (PRILOSEC) 20 MG DR capsule, TAKE 1 CAPSULE BY MOUTH DAILY, Disp: 30 capsule, Rfl: 3     ondansetron (ZOFRAN-ODT) 4 MG ODT tab, Take 1 tablet (4 mg) by mouth every 8 hours as needed for nausea, Disp: 60 tablet, Rfl: 0     psyllium (METAMUCIL/KONSYL) capsule, Take 1 capsule by mouth daily, Disp: 90 capsule, Rfl: 3     QUEtiapine (SEROQUEL) 100 MG tablet, Take 1 tablet (100 mg) by mouth At Bedtime, Disp: 30 tablet, Rfl: 0  Ibuprofen- twice a day every day, started about a week ago. Takes this for her ear piercing infection     SOCIAL HISTORY:  No alcohol, smokes cigarettes (1/2 pack a day), No THC, no opioid medications   Social History     Socioeconomic History     Marital status:      Spouse name: Not on file     Number of children: 0     Years of education: Not on file     Highest education level: Not on file   Occupational History     Employer: [employer]   Social Needs     Financial resource strain: Not on file     Food insecurity     Worry: Not on file     Inability: Not on file     Transportation needs     Medical: Not on file     Non-medical: Not on file   Tobacco Use     Smoking status: Current Every Day Smoker     Packs/day: 1.00     Years: 5.00     Pack years: 5.00     Types:  Dip, chew, snus or snuff, Other     Start date: 8/1/2011     Smokeless tobacco: Current User     Types: Chew     Last attempt to quit: 12/17/2019   Substance and Sexual Activity     Alcohol use: No     Alcohol/week: 0.0 standard drinks     Drug use: No     Types: Opiates     Comment: oxy, heroin (smoke)     Sexual activity: Yes     Partners: Female     Birth control/protection: None   Lifestyle     Physical activity     Days per week: Not on file     Minutes per session: Not on file     Stress: Not on file   Relationships     Social connections     Talks on phone: Not on file     Gets together: Not on file     Attends Scientologist service: Not on file     Active member of club or organization: Not on file     Attends meetings of clubs or organizations: Not on file     Relationship status: Not on file     Intimate partner violence     Fear of current or ex partner: Not on file     Emotionally abused: Not on file     Physically abused: Not on file     Forced sexual activity: Not on file   Other Topics Concern     Parent/sibling w/ CABG, MI or angioplasty before 65F 55M? No   Social History Narrative    Originally from [place] has an abuse history completed school on time currently has a bachelor's degree from college.  Has a wife and 3 children lives with them in a home.  No  history no access to guns or weapons enjoys fishing.       FAMILY HISTORY:  FH of CRC: half brother with esophageal cancer (48-49)   FH of IBD: Crohn's disease in father, cousin   No autoimmune or other cancers   Family History   Problem Relation Age of Onset     Gastrointestinal Disease Father         Crohn's Disease     Cancer Father         Liver Cancer     Other Cancer Father         Liver     Cancer Maternal Grandmother         lympoma     Diabetes Maternal Grandmother      Mental Illness Maternal Grandmother      Other Cancer Maternal Grandmother         Non Hodgkins Lymphoma     Diabetes Maternal Grandfather      Hypertension  Maternal Grandfather      Prostate Cancer Maternal Grandfather      Hyperlipidemia Maternal Grandfather      Heart Disease Paternal Grandfather         heart disease     Hypertension Brother      Other Cancer Brother         Esophagecial     Diabetes Brother      Hyperlipidemia Mother      Mental Illness Mother      Anxiety Disorder Mother      Anesthesia Reaction Mother         Vomiting/Nausea     Hypertension Brother      Other Cancer Brother      Other Cancer Paternal Half-Brother         Esophageal       Past/family/social history reviewed and no changes    PHYSICAL EXAMINATION:  General appearance: Healthy appearing adult, in no acute distress  Eyes: Sclera anicteric, no erythema   Ears, nose, mouth and throat: No obvious external lesions of ears and nose, Hearing intact  Neck: Symmetric, No obvious external lesions  Respiratory: Normal respiration, no use of accessory muscles   Skin: No rashes or jaundice   Psychiatric: Oriented to person, place and time, Appropriate mood and affect.     PERTINENT STUDIES:    Orders Only on 11/13/2020   Component Date Value Ref Range Status     Sodium 11/13/2020 137  133 - 144 mmol/L Final     Potassium 11/13/2020 4.1  3.4 - 5.3 mmol/L Final     Chloride 11/13/2020 108  94 - 109 mmol/L Final     Carbon Dioxide 11/13/2020 24  20 - 32 mmol/L Final     Anion Gap 11/13/2020 5  3 - 14 mmol/L Final     Glucose 11/13/2020 114* 70 - 99 mg/dL Final     Urea Nitrogen 11/13/2020 14  7 - 30 mg/dL Final     Creatinine 11/13/2020 0.65  0.52 - 1.04 mg/dL Final     GFR Estimate 11/13/2020 >90  >60 mL/min/[1.73_m2] Final     GFR Estimate If Black 11/13/2020 >90  >60 mL/min/[1.73_m2] Final     Calcium 11/13/2020 9.5  8.5 - 10.1 mg/dL Final     Lithium Level 11/13/2020 0.86  0.60 - 1.20 mmol/L Final     Color Urine 11/13/2020 Yellow   Final     Appearance Urine 11/13/2020 Slightly Cloudy   Final     Glucose Urine 11/13/2020 Negative  NEG^Negative mg/dL Final     Bilirubin Urine 11/13/2020  Negative  NEG^Negative Final     Ketones Urine 11/13/2020 Negative  NEG^Negative mg/dL Final     Specific Gravity Urine 11/13/2020 1.015  1.003 - 1.035 Final     Blood Urine 11/13/2020 Trace* NEG^Negative Final     pH Urine 11/13/2020 6.5  5.0 - 7.0 pH Final     Protein Albumin Urine 11/13/2020 Negative  NEG^Negative mg/dL Final     Urobilinogen Urine 11/13/2020 0.2  0.2 - 1.0 EU/dL Final     Nitrite Urine 11/13/2020 Negative  NEG^Negative Final     Leukocyte Esterase Urine 11/13/2020 Trace* NEG^Negative Final     Source 11/13/2020 Midstream Urine   Final     WBC Urine 11/13/2020 0 - 5  OTO5^0 - 5 /HPF Final     RBC Urine 11/13/2020 2-5* OTO2^O - 2 /HPF Final     Squamous Epithelial /LPF Urine 11/13/2020 Moderate* FEW^Few /LPF Final     MRI brain: 10/2020  IMPRESSION:  1. No significant change in several nonspecific punctate/patchy  cerebral white matter signal abnormalities, with unchanged  differential diagnosis (as was delineated on the previous MRI brain  report). In a patient of this age, primary considerations would  include mild early chronic small vessel ischemic changes,  demyelinating disease, vascular headaches, or sequela of a remote  nonspecific previous insult. No new, enhancing, or diffusion  restricting lesions to suggest active disease. Clinical correlation is  recommended.  2. Otherwise unremarkable brain MRI.      Ayana Prasad is a 30 year old female who is being evaluated via a billable telephone visit.      What phone number would you like to be contacted at? [phone]  How would you like to obtain your AVS? MyChart    Phone call duration: 10 minutes        Again, thank you for allowing me to participate in the care of your patient.        Sincerely,        Katiuska Viramontes PA-C

## 2021-01-05 NOTE — PATIENT INSTRUCTIONS
It is recommended for fever and discomfort to treat with tylenol first 325-650mg (every 4-6 hours) -- this is preferred over ibuprofen (although evidence is lacking that NSAIDS can worsen illness with COVID-19)      Self-quarantine away from others for 10 days from onset of symptoms as a precaution   -- must be at least 24-48 hours without fever (without the use of ibuprofen/tylenol) before going back out into the public      Stay hydrated: drink 60-80 ounces of water a day      If you develop severe shortness of breath/difficulty breathing,  lightheadedness or dizziness-- please call the clinic immediately or go to ED to be evaluated if symptoms appear severe.         Follow-up appointment with your doctor's office in 1 week (an E-visit may be optional if things are uncomplicated)  -They will determine when it is okay for you to return to work/school/being out in public.         COVID test is usually completed by our central testing facility within 36-72 hours but could take longer based on current demand.   -**Please sign up for [employer] - A First of Its Kind Flip Flop , this will be the fastest way to get results of your test**   -otherwise you may see delays as long as 24-48 hours to get notification by phone for a positive result; negative tests could see a delay as long as 7-10 days (via mail)  to reach you      It is important to know false negative rates for the COVID-19 PCR test can be as high as 30% , thus if your test is negative and you have ongoing or worsening symptoms please call your doctor's office for a recommendation on if you should be re-tested for COVID-19

## 2021-01-05 NOTE — PROGRESS NOTES
Subjective:   Ayana Prasad is a 30 year old female who presents for   Chief Complaint   Patient presents with     Cough     2 days coughing and body aches. Wants a covid test.     Generalized Body Aches   reports 2 days of cough/fatigue. No other symptoms other than brief dizziness.     Patient lives with her brother.   No known contacts with COVID    No loss of taste or smell  12/19/20 tested for COVID and was negative      Denies fever.   She is able to drink/eat without difficulty    Every day smoker (vapes) - no illegal THC vaping    Denies asthma or heart problems    Patient Active Problem List    Diagnosis Date Noted     Aggressive behavior 12/13/2020     Priority: Medium     AVA (generalized anxiety disorder) 11/16/2020     Priority: Medium     Chronic bilateral low back pain without sciatica 01/17/2020     Priority: Medium     Obesity (BMI 35.0-39.9) with comorbidity (H) 12/18/2019     Priority: Medium     Urinary retention 06/29/2019     Priority: Medium     Spell of altered consciousness 06/27/2019     Priority: Medium     Bella (H) 06/04/2019     Priority: Medium     Suicidal ideation 04/09/2019     Priority: Medium     Nausea 02/25/2019     Priority: Medium     Cyst of left ovary 11/05/2018     Priority: Medium     Nephrolithiasis 08/29/2018     Priority: Medium     Class 2 obesity due to excess calories in adult 03/07/2018     Priority: Medium     Auditory hallucinations 02/26/2018     Priority: Medium     Anxiety 01/05/2018     Priority: Medium     Schizoaffective disorder, bipolar type (H) 06/30/2017     Priority: Medium     PTSD (post-traumatic stress disorder) 06/30/2017     Priority: Medium     Cauda equina syndrome with neurogenic bladder (H) 01/20/2017     Priority: Medium     Moderate episode of recurrent major depressive disorder (H) 01/17/2017     Priority: Medium     Borderline personality disorder (H) 12/27/2016     Priority: Medium     History of heroin abuse (H) 12/27/2016     Priority:  Medium     Optic neuritis 03/04/2016     Priority: Medium     From recent dx of optic neuritis w/ vision loss, and iritis. Received infusions x 4 of solumedrol at [facility]. MRI done of head as well which was normal. Spinal tap looked ok per patient.         Intractable back pain 09/20/2015     Priority: Medium     Depression 02/14/2015     Priority: Medium     Overdose 02/14/2015     Priority: Medium     Overview:   Intentional overdose October 2016 and May 2016       Cannabis dependence (H) 08/22/2013     Priority: Medium     Episodic mood disorder (H) 08/22/2013     Priority: Medium     Opioid use disorder, severe, dependence (H) 08/22/2013     Priority: Medium     Substance-induced psychotic disorder with hallucinations (H) 08/22/2013     Priority: Medium     GERD (gastroesophageal reflux disease) 07/08/2013     Priority: Medium     Tobacco abuse 07/08/2013     Priority: Medium     Marijuana abuse 05/31/2013     Priority: Medium     Daily.  Some use of MDMA and cocaine in past and recently had a 4 day break where she doesn't recall getting lost in woods.        Polysubstance abuse (H) 05/31/2013     Priority: Medium     Marijuana daily, Xanax periodically.  MDMA a couple times and cocaine. Narcotics and over the counter. Dextromethorphan with overdose 5/1/16 at [facility].  CD recommended.        Bipolar 1 disorder, manic, mild 01/13/2012     Priority: Medium     Close to meeting criteria for bipolar 1? Reji Dey.  Psychology feels bipolar may be appropriate diagnosis although subsequent evaluation by psychiatry at [place] felt depression with borderline personality.  Will return for med discussion with preference for Lithium 300 two times daily with goal 12 hour trough 0.8-1.2.  March 26, 2012 - intitiated Li and seemed to help some but stopped due to shakiness.  Lamotrigine trial as having more depression issues 25/d, up to two times daily then gradually increase to 100-200 mg daily.  Consider olazapine for  thought disturbance. Has not wanted medications but used friends Xanax.  Prozac plus olazapine good next option once GI issues worked through. Patient very resistent to medications.  Continue with Reji.   May 31, 2013 - patient likely in a hypomanic/manic phase right now but no signs or symptoms of dangerous behaviors or thought processes.  Trial of olanzapine and fluoxetine. Didn't like olanzapine. Stopped as inpt for non-suicidal overdose at [facility]. Pyschiatry, psychology and continue with Prozac.  Now agreeing to take prozac and seroquel. Stopped Prozac on her own because didn't notice difference.  Trazodone didn't help. Stopped all. Possible haven?  Restart quetiapine for minor hallucinations.        ADHD (attention deficit hyperactivity disorder) 09/09/2011     Priority: Medium     Adderall ineffective.  Better on Concerta.  Still with anger issues predating medications. Declines counseling. Suggested vocational assessment.  Trial of guanfacine adjunct for anger but didn't help. Would avoid controlled substances given CD issues and impulsivity.       Abdominal pain, right upper quadrant 11/16/2010     Priority: Medium       Current Outpatient Medications   Medication     ALMACONE -400-40 MG/5ML SUSP suspension     fluPHENAZine decanoate (PROLIXIN) 25 MG/ML injection     gabapentin (NEURONTIN) 300 MG capsule     ibuprofen (ADVIL/MOTRIN) 800 MG tablet     lisdexamfetamine (VYVANSE) 40 MG capsule     lithium ER (LITHOBID) 300 MG CR tablet     norelgestromin-ethinyl estradiol (XULANE) 150-35 MCG/24HR patch     OLANZapine (ZYPREXA) 2.5 MG tablet     omeprazole (PRILOSEC) 20 MG DR capsule     ondansetron (ZOFRAN-ODT) 4 MG ODT tab     QUEtiapine (SEROQUEL) 100 MG tablet     cyproheptadine (PERIACTIN) 4 MG tablet     psyllium (METAMUCIL/KONSYL) capsule     No current facility-administered medications for this visit.        ROS:  As above per HPI    Objective:   /85   Pulse 100   Temp 99  F (37.2  C)   Resp  18   SpO2 97% , There is no height or weight on file to calculate BMI.  Gen:  NAD, appears age  HEENT: EOMI, sclera anicteric, Head normocephalic, ; nares patent; moist mucous membranes  Neck: trachea midline, no thyromegaly  CV:  Hemodynamically stable, RRR  Pulm:  no increased work of breathing , CTAB, no wheezes/rales/rhonchi   Extrem: no cyanosis, edema or clubbing  Skin: no obvious rashes or abnormalities  Psych: Euthymic, linear thoughts, normal rate of speech    No results found for any visits on 01/04/21.    Assessment & Plan:   Ayana Prasad, 30 year old female who presents with:  Cough  Non-labored work of breathing . LUngs clear. Stable vitals. Quarantine as a precaution for concern for COVID. No evidence of pneumonia at this time. Care tips provided in AVS> Patient is enrolled with [facility] and has access to receive results   - Symptomatic COVID-19 Virus (Coronavirus) by PCR  - COVID-19 [facility] Referral       NP swab collected by me  with full PPE gear    Bull Quintero MD   [place] [facility]    The use of Dragon/Fanatics dictation services may have been used to construct the content in this note; any grammatical or spelling errors are non-intentional. Please contact the author of this note directly if you are in need of any clarification.

## 2021-01-08 LAB
SARS-COV-2 RNA RESP QL NAA+PROBE: NOT DETECTED
SPECIMEN SOURCE: NORMAL

## 2021-01-10 ENCOUNTER — APPOINTMENT (OUTPATIENT)
Dept: CT IMAGING | Facility: CLINIC | Age: 31
End: 2021-01-10
Attending: EMERGENCY MEDICINE
Payer: COMMERCIAL

## 2021-01-10 ENCOUNTER — HOSPITAL ENCOUNTER (EMERGENCY)
Facility: CLINIC | Age: 31
Discharge: HOME | End: 2021-01-10
Attending: EMERGENCY MEDICINE | Admitting: EMERGENCY MEDICINE
Payer: COMMERCIAL

## 2021-01-10 VITALS
HEIGHT: 66 IN | WEIGHT: 230 LBS | TEMPERATURE: 98.1 F | HEART RATE: 76 BPM | OXYGEN SATURATION: 93 % | SYSTOLIC BLOOD PRESSURE: 149 MMHG | BODY MASS INDEX: 36.96 KG/M2 | DIASTOLIC BLOOD PRESSURE: 89 MMHG | RESPIRATION RATE: 22 BRPM

## 2021-01-10 DIAGNOSIS — R10.9 FLANK PAIN: ICD-10-CM

## 2021-01-10 DIAGNOSIS — N23 RENAL COLIC: ICD-10-CM

## 2021-01-10 DIAGNOSIS — N20.0 KIDNEY STONE: ICD-10-CM

## 2021-01-10 LAB
ALBUMIN SERPL-MCNC: 3.3 G/DL (ref 3.4–5)
ALBUMIN UR-MCNC: 10 MG/DL
ALP SERPL-CCNC: 56 U/L (ref 40–150)
ALT SERPL W P-5'-P-CCNC: 26 U/L (ref 0–50)
ANION GAP SERPL CALCULATED.3IONS-SCNC: 11 MMOL/L (ref 3–14)
APPEARANCE UR: CLEAR
AST SERPL W P-5'-P-CCNC: 16 U/L (ref 0–45)
BASOPHILS # BLD AUTO: 0.1 10E9/L (ref 0–0.2)
BASOPHILS NFR BLD AUTO: 0.5 %
BILIRUB SERPL-MCNC: 0.2 MG/DL (ref 0.2–1.3)
BILIRUB UR QL STRIP: NEGATIVE
BUN SERPL-MCNC: 13 MG/DL (ref 7–30)
CALCIUM SERPL-MCNC: 9.5 MG/DL (ref 8.5–10.1)
CHLORIDE SERPL-SCNC: 107 MMOL/L (ref 94–109)
CO2 SERPL-SCNC: 21 MMOL/L (ref 20–32)
COLOR UR AUTO: YELLOW
CREAT SERPL-MCNC: 0.6 MG/DL (ref 0.52–1.04)
DIFFERENTIAL METHOD BLD: ABNORMAL
EOSINOPHIL # BLD AUTO: 0.1 10E9/L (ref 0–0.7)
EOSINOPHIL NFR BLD AUTO: 0.9 %
ERYTHROCYTE [DISTWIDTH] IN BLOOD BY AUTOMATED COUNT: 13.7 % (ref 10–15)
GFR SERPL CREATININE-BSD FRML MDRD: >90 ML/MIN/{1.73_M2}
GLUCOSE SERPL-MCNC: 119 MG/DL (ref 70–99)
GLUCOSE UR STRIP-MCNC: NEGATIVE MG/DL
HCT VFR BLD AUTO: 39.8 % (ref 35–47)
HGB BLD-MCNC: 13 G/DL (ref 11.7–15.7)
HGB UR QL STRIP: ABNORMAL
IMM GRANULOCYTES # BLD: 0.1 10E9/L (ref 0–0.4)
IMM GRANULOCYTES NFR BLD: 0.4 %
KETONES UR STRIP-MCNC: 10 MG/DL
LEUKOCYTE ESTERASE UR QL STRIP: ABNORMAL
LYMPHOCYTES # BLD AUTO: 2.9 10E9/L (ref 0.8–5.3)
LYMPHOCYTES NFR BLD AUTO: 23.8 %
MCH RBC QN AUTO: 27 PG (ref 26.5–33)
MCHC RBC AUTO-ENTMCNC: 32.7 G/DL (ref 31.5–36.5)
MCV RBC AUTO: 83 FL (ref 78–100)
MONOCYTES # BLD AUTO: 0.5 10E9/L (ref 0–1.3)
MONOCYTES NFR BLD AUTO: 4.3 %
MUCOUS THREADS #/AREA URNS LPF: PRESENT /LPF
NEUTROPHILS # BLD AUTO: 8.4 10E9/L (ref 1.6–8.3)
NEUTROPHILS NFR BLD AUTO: 70.1 %
NITRATE UR QL: NEGATIVE
NRBC # BLD AUTO: 0 10*3/UL
NRBC BLD AUTO-RTO: 0 /100
PH UR STRIP: 6 PH (ref 5–7)
PLATELET # BLD AUTO: 394 10E9/L (ref 150–450)
POTASSIUM SERPL-SCNC: 3.7 MMOL/L (ref 3.4–5.3)
PROT SERPL-MCNC: 8 G/DL (ref 6.8–8.8)
RBC # BLD AUTO: 4.81 10E12/L (ref 3.8–5.2)
RBC #/AREA URNS AUTO: 2 /HPF (ref 0–2)
SODIUM SERPL-SCNC: 139 MMOL/L (ref 133–144)
SOURCE: ABNORMAL
SP GR UR STRIP: 1.03 (ref 1–1.03)
SQUAMOUS #/AREA URNS AUTO: 4 /HPF (ref 0–1)
UROBILINOGEN UR STRIP-MCNC: 0 MG/DL (ref 0–2)
WBC # BLD AUTO: 12 10E9/L (ref 4–11)
WBC #/AREA URNS AUTO: 4 /HPF (ref 0–5)

## 2021-01-10 PROCEDURE — 96361 HYDRATE IV INFUSION ADD-ON: CPT

## 2021-01-10 PROCEDURE — 96375 TX/PRO/DX INJ NEW DRUG ADDON: CPT

## 2021-01-10 PROCEDURE — 80053 COMPREHEN METABOLIC PANEL: CPT | Performed by: EMERGENCY MEDICINE

## 2021-01-10 PROCEDURE — 81001 URINALYSIS AUTO W/SCOPE: CPT | Performed by: EMERGENCY MEDICINE

## 2021-01-10 PROCEDURE — 74176 CT ABD & PELVIS W/O CONTRAST: CPT

## 2021-01-10 PROCEDURE — 96376 TX/PRO/DX INJ SAME DRUG ADON: CPT

## 2021-01-10 PROCEDURE — 96374 THER/PROPH/DIAG INJ IV PUSH: CPT

## 2021-01-10 PROCEDURE — 85025 COMPLETE CBC W/AUTO DIFF WBC: CPT | Performed by: EMERGENCY MEDICINE

## 2021-01-10 PROCEDURE — 250N000011 HC RX IP 250 OP 636: Performed by: EMERGENCY MEDICINE

## 2021-01-10 PROCEDURE — 258N000003 HC RX IP 258 OP 636: Performed by: EMERGENCY MEDICINE

## 2021-01-10 PROCEDURE — 99285 EMERGENCY DEPT VISIT HI MDM: CPT | Mod: 25

## 2021-01-10 RX ORDER — HYDROMORPHONE HYDROCHLORIDE 1 MG/ML
0.5 INJECTION, SOLUTION INTRAMUSCULAR; INTRAVENOUS; SUBCUTANEOUS
Status: COMPLETED | OUTPATIENT
Start: 2021-01-10 | End: 2021-01-10

## 2021-01-10 RX ORDER — MORPHINE SULFATE 4 MG/ML
6 INJECTION, SOLUTION INTRAMUSCULAR; INTRAVENOUS
Status: COMPLETED | OUTPATIENT
Start: 2021-01-10 | End: 2021-01-10

## 2021-01-10 RX ORDER — SODIUM CHLORIDE 9 MG/ML
INJECTION, SOLUTION INTRAVENOUS CONTINUOUS
Status: DISCONTINUED | OUTPATIENT
Start: 2021-01-10 | End: 2021-01-11 | Stop reason: HOSPADM

## 2021-01-10 RX ORDER — IBUPROFEN 600 MG/1
600 TABLET, FILM COATED ORAL EVERY 8 HOURS PRN
Qty: 30 TABLET | Refills: 0 | Status: SHIPPED | OUTPATIENT
Start: 2021-01-10 | End: 2021-01-11

## 2021-01-10 RX ORDER — ONDANSETRON 4 MG/1
4 TABLET, ORALLY DISINTEGRATING ORAL EVERY 8 HOURS PRN
Qty: 10 TABLET | Refills: 0 | Status: SHIPPED | OUTPATIENT
Start: 2021-01-10 | End: 2021-01-11

## 2021-01-10 RX ORDER — KETOROLAC TROMETHAMINE 15 MG/ML
15 INJECTION, SOLUTION INTRAMUSCULAR; INTRAVENOUS ONCE
Status: COMPLETED | OUTPATIENT
Start: 2021-01-10 | End: 2021-01-10

## 2021-01-10 RX ORDER — ONDANSETRON 2 MG/ML
4 INJECTION INTRAMUSCULAR; INTRAVENOUS EVERY 30 MIN PRN
Status: DISCONTINUED | OUTPATIENT
Start: 2021-01-10 | End: 2021-01-11 | Stop reason: HOSPADM

## 2021-01-10 RX ORDER — HYDROCODONE BITARTRATE AND ACETAMINOPHEN 5; 325 MG/1; MG/1
1 TABLET ORAL EVERY 6 HOURS PRN
Qty: 10 TABLET | Refills: 0 | Status: SHIPPED | OUTPATIENT
Start: 2021-01-10 | End: 2021-01-11

## 2021-01-10 RX ORDER — TAMSULOSIN HYDROCHLORIDE 0.4 MG/1
0.4 CAPSULE ORAL DAILY
Qty: 7 CAPSULE | Refills: 0 | Status: SHIPPED | OUTPATIENT
Start: 2021-01-10 | End: 2021-01-11

## 2021-01-10 RX ADMIN — KETOROLAC TROMETHAMINE 15 MG: 15 INJECTION, SOLUTION INTRAMUSCULAR; INTRAVENOUS at 20:01

## 2021-01-10 RX ADMIN — SODIUM CHLORIDE 1000 ML: 9 INJECTION, SOLUTION INTRAVENOUS at 20:02

## 2021-01-10 RX ADMIN — HYDROMORPHONE HYDROCHLORIDE 0.5 MG: 1 INJECTION, SOLUTION INTRAMUSCULAR; INTRAVENOUS; SUBCUTANEOUS at 20:01

## 2021-01-10 RX ADMIN — ONDANSETRON 4 MG: 2 INJECTION INTRAMUSCULAR; INTRAVENOUS at 20:01

## 2021-01-10 RX ADMIN — MORPHINE SULFATE 6 MG: 4 INJECTION, SOLUTION INTRAMUSCULAR; INTRAVENOUS at 20:46

## 2021-01-10 RX ADMIN — ONDANSETRON 4 MG: 2 INJECTION INTRAMUSCULAR; INTRAVENOUS at 20:46

## 2021-01-10 ASSESSMENT — MIFFLIN-ST. JEOR: SCORE: 1780.02

## 2021-01-10 ASSESSMENT — ENCOUNTER SYMPTOMS: FLANK PAIN: 1

## 2021-01-10 NOTE — ED AVS SNAPSHOT
[facility] Emergency Dept  [address] [address]  Phone: [phone]  Fax: [phone]                                    Ayana Prasad   MRN: [id number]    Department: [facility] Emergency Dept   Date of Visit: 1/10/2021           After Visit Summary Signature Page    I have received my discharge instructions, and my questions have been answered. I have discussed any challenges I see with this plan with the nurse or doctor.    ..........................................................................................................................................  Patient/Patient Representative Signature      ..........................................................................................................................................  Patient Representative Print Name and Relationship to Patient    ..................................................               ................................................  Date                                   Time    ..........................................................................................................................................  Reviewed by Signature/Title    ...................................................              ..............................................  Date                                               Time          22EPIC Rev 08/18

## 2021-01-11 ENCOUNTER — PATIENT OUTREACH (OUTPATIENT)
Dept: CARE COORDINATION | Facility: CLINIC | Age: 31
End: 2021-01-11

## 2021-01-11 ENCOUNTER — TELEPHONE (OUTPATIENT)
Dept: FAMILY MEDICINE | Facility: CLINIC | Age: 31
End: 2021-01-11

## 2021-01-11 DIAGNOSIS — H60.12 CELLULITIS OF LEFT EAR: ICD-10-CM

## 2021-01-11 DIAGNOSIS — Z71.89 OTHER SPECIFIED COUNSELING: ICD-10-CM

## 2021-01-11 RX ORDER — IBUPROFEN 600 MG/1
600 TABLET, FILM COATED ORAL EVERY 8 HOURS PRN
Qty: 30 TABLET | Refills: 0 | Status: CANCELLED | OUTPATIENT
Start: 2021-01-11

## 2021-01-11 RX ORDER — HYDROCODONE BITARTRATE AND ACETAMINOPHEN 5; 325 MG/1; MG/1
1 TABLET ORAL EVERY 6 HOURS PRN
Qty: 10 TABLET | Refills: 0 | Status: CANCELLED | OUTPATIENT
Start: 2021-01-11

## 2021-01-11 RX ORDER — ONDANSETRON 4 MG/1
4 TABLET, ORALLY DISINTEGRATING ORAL EVERY 8 HOURS PRN
Qty: 10 TABLET | Refills: 0 | Status: CANCELLED | OUTPATIENT
Start: 2021-01-11

## 2021-01-11 NOTE — DISCHARGE INSTRUCTIONS
As we discussed, you likely did have a kidney stone, as you have some blood in your urine, and your CT scan shows stones in your kidney, but not in the ureter, where pain is typically felt.  You may have passed a kidney stone here in the ER prior to the scan as well.  Regardless, your abdomen is benign at this point, and as long as you do not of a fever, and your pain is controlled you are stable to go home and follow with your regular doctor, and seek a urology referral in the next 7 days.  You may also look for a urologist in your own neighborhood make an appointment directly with them in the next 1 to 2 weeks as well.  Please take the medication we have given you, come back to the ER immediately with any fevers, vomiting, or any other concerns.

## 2021-01-11 NOTE — TELEPHONE ENCOUNTER
Reason for Call: Request for an order or referral:    Order or referral being requested: Patient is calling asking for a referral to be able to  her prescriptions at the pharmacy.       Date needed: as soon as possible    Has the patient been seen by the PCP for this problem? NO    Phone number Patient can be reached at:  Home number on file [phone]    Best Time:  any    Can we leave a detailed message on this number?  YES    Call taken on 1/11/2021 at 2:36 PM by Shannan Camarillo

## 2021-01-11 NOTE — TELEPHONE ENCOUNTER
Refills have been e-prescribed to the [facility] - [place], [place] - [address].    Kaylie Herrera RN BSN  [facility]

## 2021-01-11 NOTE — PROGRESS NOTES
Clinic Care Coordination Contact  [facility]/Voicemail    Clinical Data: CHW Outreach  Outreach attempted x 1.  Left message on patient's voicemail with call back information and requested return call.  Plan: CHW will try to reach patient again in 1-2 business days.    Tori Zapien, MPH  Community Health Worker   Office: [phone]  [facility], and [facility]  [address], [place] [address]  [facility]  [address] [place] [address]  [facility]  [address], [place] [address]  [email or web address]    Referral made off of discharge report.    Notes:  -Saw you were in the ER and wanted to see if you had non-emergent medical or medication questions for CC RN at this time, or any resources want to discuss with CC SW    -Follow up with PCP?

## 2021-01-11 NOTE — TELEPHONE ENCOUNTER
Reason for Call:  Medication or medication refill:    Do you use a [facility]?  Name of the pharmacy and phone number for the current request:      Name of the medication requested: HYDROcodone-acetaminophen (NORCO) 5-325 MG tablet, ondansetron (ZOFRAN ODT) 4 MG ODT tab    Other request: ibuprofen (ADVIL/MOTRIN) 600 MG tablet    Can we leave a detailed message on this number? YES    Phone number patient can be reached at: Cell number on file:    Telephone Information:   Mobile [phone]       Best Time: anytime    Call taken on 1/11/2021 at 12:02 PM by Calvin Sommers

## 2021-01-11 NOTE — TELEPHONE ENCOUNTER
[facility] - [place], [place] - [address] [place] [address] [address] pharmacy states that patient needs to call the Restricted Recipient Line at [employer].     I called Ayana and she said that she had already called the Restricted Recipient Line at [employer].   She said that in order for the pharmacy to allow Emily Esteves to fill her prescriptions, she needed us to call the Restricted Recipient Line at [employer] at [phone].     I called the Restricted Recipient Line at [employer].    and had to leave a message.     The [place] pharmacy closes at 5:00 PM today and Renetta Avery NP (PCP) is back tomorrow morning. When I asked Ayana if she had run out of these medications (Norco, Ibuprofen & Zofran) she said they were all new medications for her. She was discharged from the hospital on 1/10/21 (Sunday evening).     Kaylie Herrera RN BSN  [facility]

## 2021-01-11 NOTE — ED PROVIDER NOTES
"   History   Chief Complaint  Right Flank Pain    HPI   Ayana Prasad is a 30 year old female with a history of nephrolithiasis  who presents for evaluation of right flank pain that onset 1 hour PTA.  Pain is severe, comes and goes, located to her right flank, does not radiate, and is sharp in nature.  Ayana is confident that this is kidney stone. She notes gallbladder removal in 2015. She denies taking anything for pain.     Review of Systems   Genitourinary: Positive for flank pain.   All other systems reviewed and are negative.      Allergies  Haldol [Haloperidol]  Adhesive Tape  Percocet [Oxycodone-Acetaminophen]  Prednisone  Risperidone  Tramadol Hcl  Droperidol  Seroquel [Quetiapine]    Medications  almacone II  Periactin  Prolixin  Neurontin  Vyvanse  Lithobid  Zyprexa  Prilosec  Psyllium  Seroquel    Past Medical History  ADHD  Bipolar 1 disorder  Borderline personality disorder  Cauda equina syndrome  Chronic low back pain  Depression  GERD  TBI  Marginal corneal ulcer, left  Nephrolithiasis  Polysubstance abuse, methamphetamine, hallucinogen, heroin, marijuana  PTSD    Past Surgical History  Back surgery for cauda equina  Colonoscopy  Cystoscopy left ureter  ENT surgery  EGD  laparoscopic cholecystectomy  laser holmium lithotripsy  Transurethral stone resection    Family History  chron's disease  Hypertension  Esophageal cancer  Diabetes  Hyperlipidemia  Anxiety    Social History  Tobacco use: current every day smoker with chew usage  Alcohol use: not currently   Drug use: not currently  Marital Status:   PCP: Becki Avery    Physical Exam     Patient Vitals for the past 24 hrs:   BP Temp Pulse Resp SpO2 Height Weight   01/10/21 2107 136/79 -- 76 -- 98 % -- --   01/10/21 1936 -- 98.1  F (36.7  C) 132 22 97 % 1.676 m (5' 6\") 104.3 kg (230 lb)       Physical Exam  Vitals: reviewed by me  General: Pt seen pacing in the room, pleasant, cooperative, and alert to conversation, does seem to be " in pain however.  Eyes: Tracking well, clear conjunctiva BL  ENT: MMM, midline trachea.   Lungs:   No tachypnea, no accessory muscle use. No respiratory distress.   CV: Rate as above, regular rhythm.    Abd: Soft, non tender, no guarding, no rebound. Non distended, right-sided CVA tenderness noted, no left-sided CVA tenderness.  MSK: no peripheral edema or joint effusion.  No evidence of trauma  Skin: No rash, normal turgor and temperature  Neuro: Clear speech and no facial droop.  Psych: Not RIS, no e/o AH/VH      Emergency Department Course   Imaging:  Radiology findings were communicated with the patient who voiced understanding of the findings.    Abd/pelvis CT no contrast - Stone Protocol  IMPRESSION:   1.  Nonobstructing renal collecting system stones bilaterally. No  hydronephrosis. No ureteral or bladder calculi. No acute changes  otherwise evident in the abdomen or pelvis to account for patient's  symptoms.    Readings per Radiology    Laboratory:  Laboratory findings were communicated with the patient who voiced understanding of the findings.    CBC: WBC: 12.0 (high), HGB: 13.0, PLT: 394  CMP: Glucose 119 (high), Albumin: 3.3 (low), o/w WNL (Creatinine: 0.60)  UA: Ketones: 10 (A), Blood: Trace (A), Protein Albumin: 10 (A), Leukocyte Esterase: Moderate (A), Squamous Epithelial: 4 (H), Mucous: Present (A), o/w Negative    Emergency Department Course:  Reviewed:  1932 I reviewed the patient's nursing notes, vitals, past medical records, Care Everywhere.     Assessments:  1946 I physically examined the patient as documented above.  2130 I re-assessed the patient and updated them on the results of their workup thus far.    Interventions:  2001 Zofran 4 mg IV  2001 Dilaudid 0.5 mg IV  2001 Toradol 15 mg IV  2002 NS 1L IV   2046 morphine 6 mg IV    Disposition:  The patient was discharged to home.     Impression & Plan   Medical Decision Making:  Ayana Prasad is a 30 year old female who presents with flank  pain, with what appears to be a past kidney stone. She has had one in the past. She does have some hematuria. She does have some kidney stones on her CT scan but they do not appear to be ureteral. I do believe she has a benign abdomen, at time of discharge, and she feels comfortable going home with very clear [id number] precautions. Will plan for discharge as above.    Diagnosis:    ICD-10-CM    1. Kidney stone  N20.0    2. Renal colic  N23    3. Flank pain  R10.9        Disposition:  Discharged to home.    Discharge Medications:  New Prescriptions    HYDROCODONE-ACETAMINOPHEN (NORCO) 5-325 MG TABLET    Take 1 tablet by mouth every 6 hours as needed    IBUPROFEN (ADVIL/MOTRIN) 600 MG TABLET    Take 1 tablet (600 mg) by mouth every 8 hours as needed for moderate pain    ONDANSETRON (ZOFRAN ODT) 4 MG ODT TAB    Take 1 tablet (4 mg) by mouth every 8 hours as needed    TAMSULOSIN (FLOMAX) 0.4 MG CAPSULE    Take 1 capsule (0.4 mg) by mouth daily for 7 days       Scribe Disclosure:  I, Mauro Mota, am serving as a scribe at 7:32 PM on 1/10/2021 to document services personally performed by Oswaldo Baig MD based on my observations and the provider's statements to me.      Oswaldo Baig MD  01/10/21 6694

## 2021-01-12 ENCOUNTER — PATIENT OUTREACH (OUTPATIENT)
Dept: NURSING | Facility: CLINIC | Age: 31
End: 2021-01-12
Payer: COMMERCIAL

## 2021-01-12 NOTE — TELEPHONE ENCOUNTER
I called the [employer] restricted program, spoke to rep, says our options are to have PCP re-send the Rx's or else complete and fax the [facility] restricted recipient program form to them indicating okay to fill Rx's sent by Emily Esteves.    If we fax the form, would be helpful to call [employer] restricted recipient program at [phone] 15 minutes or so after faxed as the patient is waiting for med fills from [place].    Felicitas with [employer] emailed me the blank form.    I consulted with Emily Cadena to see if she wants form completed and faxed so she can be alternate prescriber or if she wants to have this routed to Becki Avery to re-send Rx's.    She advises okay to send form approving her for this one time fill.    Edith Batista RN  [facility]

## 2021-01-12 NOTE — TELEPHONE ENCOUNTER
I called [facility] restricted recipient program at [phone]; spoke to Anitha again.   They did receive the fax.       She says she will work on this and will call us at clinic [phone] if there are any issues.    No more action needed by us.    Attempted to call patient at [phone] number, no answer, left message on voicemail briefly advised the issue from yesterday should be resolved; patient was instructed to return call to [facility] at [phone] for any concerns.   Will leave this open a day in case of call back from [facility] or patient.    Edith Batista RN  [facility]

## 2021-01-12 NOTE — PROGRESS NOTES
Clinic Care Coordination Contact  Community Health Worker Initial Outreach    CHW Additional Questions  If ED/Hospital discharge, follow-up appointment scheduled as recommended?: No  Patient agreeable to assistance with scheduling?: No  Medication changes made following ED/Hospital discharge?: Yes, but patient said she did not have questions  MyChart active?: Yes  Patient sent Social Determinants of Health questionnaire?: No    Patient accepts CC: No, patient said she did not have non-emergent medical or medication questions for CC RN or want to discuss resources with CC SW at this time.    CHW spoke with patient today. Patient said she is well. CHW told patient she saw patient was in the hospital and wanted to see if patient had non-emergent medical or medication questions for CC RN at this time, or any resources want to discuss with CC SW. Patient said no, she thinks she is good. CHW told patient to call the clinic if interested in CC in the future, and can call clinic with non-emergent questions.    CHW asked if patient would like CHW to send a message to PCP about a follow up appt. Patient said no, she is good. CHW asked if patient had any other questions. Patient said no.    Referral made off of discharge report.    Tori Zapien, MPH  Community Health Worker   Office: [phone]  [facility] [place], [facility], and [facility]  [address] [place] [address]  [facility]  [address] [place] [address]  [facility]  [address] [place] [address]  [email or web address]

## 2021-01-12 NOTE — TELEPHONE ENCOUNTER
Form faxed to [employer] restricted recipient program.    Will call in 15 minutes to verify received.    Edith Batista RN  [facility]

## 2021-01-12 NOTE — TELEPHONE ENCOUNTER
I called [facility] restricted recipient program.    Spoke to Anitha, fax has not arrived yet.   She says they have received 3 other faxes since 10 am so is sure the fax is working.    She asked me to fax again and call in another 30 minutes to verify it was received.    I checked with giovanni Bearden'y, she says it appears the first fax went through successfully.    I re-faxed as advised by [facility].     Will call back at 11 am to verify they got it.    Edith Batista RN  [facility]

## 2021-01-13 DIAGNOSIS — N20.0 NEPHROLITHIASIS: ICD-10-CM

## 2021-01-13 RX ORDER — HYDROCODONE BITARTRATE AND ACETAMINOPHEN 5; 325 MG/1; MG/1
1 TABLET ORAL EVERY 6 HOURS PRN
Qty: 10 TABLET | Refills: 0 | Status: SHIPPED | OUTPATIENT
Start: 2021-01-13 | End: 2021-02-16

## 2021-01-13 RX ORDER — ONDANSETRON 4 MG/1
4 TABLET, ORALLY DISINTEGRATING ORAL EVERY 8 HOURS PRN
Qty: 10 TABLET | Refills: 0 | Status: SHIPPED | OUTPATIENT
Start: 2021-01-13 | End: 2021-04-14

## 2021-01-13 RX ORDER — IBUPROFEN 600 MG/1
600 TABLET, FILM COATED ORAL EVERY 8 HOURS PRN
Qty: 30 TABLET | Refills: 0 | Status: SHIPPED | OUTPATIENT
Start: 2021-01-13 | End: 2021-02-04

## 2021-01-15 ENCOUNTER — TELEPHONE (OUTPATIENT)
Dept: PLASTIC SURGERY | Facility: CLINIC | Age: 31
End: 2021-01-15

## 2021-01-15 NOTE — TELEPHONE ENCOUNTER
Pt lvm regarding question they had. Called pt back, discussed providing hormone referrals to pt. Sent a [employer] message with a list of providers.     Shad Zarate

## 2021-01-22 ENCOUNTER — MYC MEDICAL ADVICE (OUTPATIENT)
Dept: FAMILY MEDICINE | Facility: CLINIC | Age: 31
End: 2021-01-22

## 2021-01-22 DIAGNOSIS — F64.9 GENDER IDENTITY DISORDER: Primary | ICD-10-CM

## 2021-01-28 ENCOUNTER — TELEPHONE (OUTPATIENT)
Dept: FAMILY MEDICINE | Facility: CLINIC | Age: 31
End: 2021-01-28

## 2021-01-28 NOTE — TELEPHONE ENCOUNTER
Reason for Call:  Other     Detailed comments: Susana from [facility] had called requesting that the clinic fax patients updated medication list to [phone]        Call taken on 1/28/2021 at 1:08 PM by Lorelei Renteria

## 2021-01-29 ENCOUNTER — APPOINTMENT (OUTPATIENT)
Dept: MRI IMAGING | Facility: CLINIC | Age: 31
End: 2021-01-29
Attending: EMERGENCY MEDICINE
Payer: COMMERCIAL

## 2021-01-29 ENCOUNTER — HOSPITAL ENCOUNTER (EMERGENCY)
Facility: CLINIC | Age: 31
Discharge: HOME | End: 2021-01-30
Attending: EMERGENCY MEDICINE | Admitting: EMERGENCY MEDICINE
Payer: COMMERCIAL

## 2021-01-29 DIAGNOSIS — M54.42 ACUTE LEFT-SIDED LOW BACK PAIN WITH LEFT-SIDED SCIATICA: ICD-10-CM
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
## 2021-01-29 LAB — HCG UR QL: NEGATIVE

## 2021-01-29 PROCEDURE — 51798 US URINE CAPACITY MEASURE: CPT

## 2021-01-29 PROCEDURE — 81025 URINE PREGNANCY TEST: CPT | Performed by: EMERGENCY MEDICINE

## 2021-01-29 PROCEDURE — 72148 MRI LUMBAR SPINE W/O DYE: CPT

## 2021-01-29 PROCEDURE — 99284 EMERGENCY DEPT VISIT MOD MDM: CPT | Mod: 25

## 2021-01-29 ASSESSMENT — ENCOUNTER SYMPTOMS
NECK PAIN: 0
BACK PAIN: 1
ROS GI COMMENTS: NO BOWEL INCONTINENCE

## 2021-01-29 ASSESSMENT — MIFFLIN-ST. JEOR: SCORE: 1780.02

## 2021-01-30 ENCOUNTER — MYC MEDICAL ADVICE (OUTPATIENT)
Dept: FAMILY MEDICINE | Facility: CLINIC | Age: 31
End: 2021-01-30

## 2021-01-30 VITALS
WEIGHT: 230 LBS | SYSTOLIC BLOOD PRESSURE: 133 MMHG | HEART RATE: 100 BPM | BODY MASS INDEX: 36.96 KG/M2 | DIASTOLIC BLOOD PRESSURE: 98 MMHG | OXYGEN SATURATION: 98 % | TEMPERATURE: 98.8 F | HEIGHT: 66 IN | RESPIRATION RATE: 18 BRPM

## 2021-01-30 DIAGNOSIS — M54.9 INTRACTABLE BACK PAIN: Primary | ICD-10-CM

## 2021-01-30 ASSESSMENT — ENCOUNTER SYMPTOMS
ABDOMINAL PAIN: 0
SHORTNESS OF BREATH: 0

## 2021-01-30 NOTE — DISCHARGE INSTRUCTIONS
Please follow up with your spine and primary care doctor.  Your MRI does not show any new emergent concerns tonight.

## 2021-01-30 NOTE — ED TRIAGE NOTES
Pt reports slipping on the ice last night. Pt complains of lower back pain. Pt states that today she lost bladder control. Pt states this has happened before and was considered to be due to her dx of cauda equina.

## 2021-01-30 NOTE — ED PROVIDER NOTES
History   Chief Complaint:  Back Pain    HPI   Ayana Prasad is a 30 year old female, with a history of cauda equina syndrome, chronic low back pain, nephrolithiasis, and TBI, who presents to the ED for evaluation of back pain. The patient reports she fell yesterday after she was walking and slipped on some ice. She landed directly on her lower back and did not strike her head or lose consciousness. She did not have immediate pain and was able to ambulate after the fall. However, beginning this morning she stated her lower back began to feel extremely painful and she took 800 mg ibuprofen about five hours ago. She also had left leg radiculopathy as well, which progressed as the day went on. Her pain did not improve much and about an hour and a half ago she stated she lost control of her bladder as she was laying in bed. She states she lost complete control, with a full void, and then felt as if she fully emptied her bladder. The patient denies any bowel incontinence. She has had cauda equina in the past, per her report, and this feels similar to her. The patient denies any thoracic or cervical back pain. She has no neck pain or any other complaints at this time.     Allergies  Haldol  Percocet   Prednisone  Risperidone  Tramadol  Droperidol  Seroquel     Medications  Periactin  Prolixin  Neurontin  Vyvanse  Lithobid  Zyprexa  Prilosec  Psyllium  Seroquel     Past Medical History  ADHD  Bipolar 1 disorder  Borderline personality disorder  Cauda equina syndrome  Chronic low back pain  Depression  GERD  TBI  Marginal corneal ulcer, left  Nephrolithiasis  Polysubstance abuse, methamphetamine, hallucinogen, heroin, marijuana  PTSD     Past Surgical History  Back surgery for cauda equina  ENT surgery  Laparoscopic cholecystectomy  Laser holmium lithotripsy  Transurethral stone resection     Family History  Crohn's disease  Hypertension  Esophageal cancer  Diabetes  Hyperlipidemia  Anxiety    Social History:  Smoking  "status: Electronic cigarette  Alcohol use: No  PCP: Becki Avery  Presents to the ED alone  Marital Status:   [4]     Review of Systems   Respiratory: Negative for shortness of breath.    Cardiovascular: Negative for chest pain.   Gastrointestinal: Negative for abdominal pain.        No bowel incontinence   Genitourinary:        Bladder incontinence   Musculoskeletal: Positive for back pain. Negative for neck pain.   All other systems reviewed and are negative.    Physical Exam     Patient Vitals for the past 24 hrs:   BP Temp Temp src Pulse Resp SpO2 Height Weight   01/30/21 0034 (!) 133/98 -- -- 100 -- 98 % -- --   01/29/21 2154 (!) 134/107 98.8  F (37.1  C) Oral 102 18 98 % 1.676 m (5' 6\") 104.3 kg (230 lb)       Physical Exam  General: Appears well-developed and well-nourished.   Head: No signs of trauma.   CV: Normal rate and regular rhythm.    Resp: Effort normal and breath sounds normal. No respiratory distress.   GI: Soft. There is no tenderness.  No rebound or guarding.  Normal bowel sounds.  No CVA tenderness.  MSK: Normal range of motion.  No point tenderness to the back.  Neuro: The patient is alert and oriented to person, place, and time.  Strength in upper/lower extremities normal and symmetrical. Sensation normal including saddle region. Speech normal.  Ambulatory.  GCS 15  Skin: Skin is warm and dry. No rash noted.   Psych: normal mood and affect. behavior is normal.       Emergency Department Course   Imaging:    Lumbar spine MRI without contrast:  1.  L4-L5 through L5-S1 laminectomy.   2.  L5-S1 pronounced spondylosis described above.   3.  L5-S1 prominent bulge with superimposed posterior disc extrusion extending mildly above and below the disc margin.   4.   Moderate to severe bilateral foraminal stenosis. Disc osteophyte spurs contact the bilateral exiting L5 nerve roots. Please correlate clinically for L5 radiculopathy.   5.  No critical thecal sac stenosis.    Imaging independently " reviewed and agree with radiologist interpretation.      Laboratory:    HCG Qualitative: Negative    Emergency Department Course:  Reviewed:  I reviewed the patient's nursing notes, vitals, and available past medical records.     Assessments:  2215: I assessed the patient and performed an exam as detailed above.    0010: I rechecked the patient. Explained findings to patient.    Disposition:  The patient was discharged to home.     Impression & Plan   Medical Decision Making:  Ayana Prasad is a 30-year-old woman who presents due to back pain.  She reports that she had slipped and fell on the ice yesterday, initially she had back pain with pain going down the left leg.  She reports that tonight she lost control of her bladder and was concerned for cauda equina, as she has had this a few years ago.  From my evaluation she was ambulatory and fully neurologically intact including normal sensation in the saddle region.  She also did a post void and she did not have any signs of urinary retention or signs of overflow incontinence.  Given her history, I did decide to obtain an MRI.  This did not show findings concerning for cauda equina.  Patient was discharged with recommendation for continued supportive care and to follow-up with her primary care doctor and spine specialist.    Diagnosis:    ICD-10-CM    1. Acute left-sided low back pain with left-sided sciatica  M54.42    2. Fall due to slipping on ice or snow, initial encounter  W00.9XXA        Disposition:  Discharged to home.    Scribe Disclosure:  I, Rios Delgadillo, am serving as a scribe at 10:15 PM on 1/29/2021 to document services personally performed by Stiven Mcelroy MD based on my observations and the provider's statements to me.      Stiven Mcelroy MD  01/30/21 0456

## 2021-02-01 ENCOUNTER — VIRTUAL VISIT (OUTPATIENT)
Dept: INTERNAL MEDICINE | Facility: CLINIC | Age: 31
End: 2021-02-01
Payer: MEDICAID

## 2021-02-01 DIAGNOSIS — F64.9 GENDER DYSPHORIA: Primary | ICD-10-CM

## 2021-02-01 PROCEDURE — 99204 OFFICE O/P NEW MOD 45 MIN: CPT | Mod: 95 | Performed by: INTERNAL MEDICINE

## 2021-02-01 NOTE — NURSING NOTE
Chief Complaint   Patient presents with     Consult     Pt would like to discuss consultation for gender care      Nevin Smith, EMT at 12:04 PM sign on 2/1/2021

## 2021-02-01 NOTE — PROGRESS NOTES
Pre charting:   Date of pre chartin21  10:03-10:43 a.m.  Total time 40 min  This effort is essential to preparing for upcoming service directly affecting ongoing primary care management and coordination of care for this patient for the same day office visit.  Person performing pre charting work:  Dr. Hugo  This non face-to-face clinical work included review/documentation of medical history, notes and test results outside our system (e.g.. CareEverywhere, paper copies), medications, need for refills, vital sign trends,  flow sheets such as PHQ-9 and AVA-7 questionnaires, hospital discharge summaries, routine health care maintenance, specialist notes, laboratory, procedures, imaging, etc.      Virtual visit    CC:  evaluation    Other health care team members:    Restricted care recipient: PCP SUSANNAH Avery    Neurology:  Dr. Russell  Psychiatry: NP Ashlyn Fields  Behavioral health & Addiction Joana Hagen ([facility], [facility]  Endocrinology/Weight Management:  Dr. Edge  Gastroenterology:  VASILE Viramontes    HPI:  30 year old   PMHx:  TBI  Polysubstance abuse (in remission, hx methampheamines, heroin, hallucinogens, marijuana use)  Depression  Bipolar disorder  ADHD  PTSD  Borderline Personality Disorder  Schizoaffective dixorder  Chronic low back pain  Cauda Equina syndrome s/p surgery 2016  Subsequent neurogenic bladder  Gerd  Nephrolithiasis  C. Diff  IBS  Spells of altered consciousness, described as back arch, whole body shaking, lasting seconds), no EEG correlation, MRI negative, suggestive of conversion disorder.  Episodic, complete facial numbness w/o sensory loss or weakness.  History of extensive psychiatric medications (see below)  Obesity BMI 37    The patient has longstanding psychiatric history with previous mediation management with numerous medications:   - Cymbalta 20-30mg (unknown, 2017 trial)  - Adderall XR 10-20mg (tolerated, some efficacy)  - Xanax 0.5mg (over  "sedating)  - Abilify 10-15mg (unknown, 2018 trial)  - Lunesta 2-3mg (effective, 2019 trial)  - Prozac 20mg (tolerated, ineffective)  - Intuniv and Tenex 1mg (both effective, severe dry mouth with guanfacine)  - hydroxyzine HCL and catalina 25-50mg (ineffective, worsened urinary retention)  - lamotrigine 200mg (unknown, 2012 trial)  - Vyvanse 20mg (effective, \"better than Adderall\")  - Ativan 0.5mg (effective)  - Latuda 40mg (2018 trial, unknown)  - melatonin 10mg (ineffective)  - Concerta 18mg (2011 trial, overly sedating)  - Remeron 7.5mg (2018 trial, unsure if effective)  - olanzapine 5-10mg (2019 trial, effective)  - Propranolol 10-20mg (effective, poorly tolerated- may have dropped BP)  - risperidone 0.25-1mg (2017 trial, allergy)  - Strattera 60-80mg (effective, 2016 trial)  - trazodone 50-200mg (ineffective)  - Stelazine 2-6mg (effective)  - Geodon 80mg (limited efficacy)  - Ambien 10mg (effective, possibly parasomnias)  - Prazosin  - Trifluoperazine  - Haldol (allergy, agitating)  - Quetiapine (allergy, QT prolongation, palpitations)    Most recently, the patient was seen in the ED 1/29/21 for back pain following a fall.  LLE radicular pain. Loss of bladder control. Neuro exam negative.  MRI negative for cauda equina.    Patient was also seen in ED on 1/10/21 for right flank pain. CT showed non opstructing bilateral stones without hydronephrosis. UA included squamous epithelia, mucus. Patient received Zofran, Dilaudid, Toradol, and morphine in the ED.    Patient was admitted 12/12-12/19/20 for diarrhea, C diff    Today:  Ayana is seeking gender care.  Her care coordinator advised that she make an appointment with me.  Transitioning female to male.  Would like to start testosterone and is interested in top surgery as well.  We talked about a referral to our Gender Care program and she would like this. Because she is restricted to her primary care provider, SUSANNAH Avery, she will require referrals and Ayana is " aware of this.    Past Surgical History:   Procedure Laterality Date     BACK SURGERY - For Cauda Equina Syndrome  12/24/2016     COLONOSCOPY       COMBINED CYSTOSCOPY, INSERT STENT URETER(S) Left 8/30/2018    Procedure: COMBINED CYSTOSCOPY, INSERT STENT URETER(S);  Cystoscopy With Left Stent Placement;  Surgeon: Kiran Ulrich MD;  Location: [facility]     COMBINED CYSTOSCOPY, RETROGRADES, EXCHANGE STENT URETER(S) Left 10/14/2018    Procedure: COMBINED CYSTOSCOPY, RETROGRADES, EXCHANGE STENT URETER(S);  Cystoscopy and removal of left-sided stent.;  Surgeon: Stiven Olivo MD;  Location:  OR     COMBINED CYSTOSCOPY, RETROGRADES, URETEROSCOPY, INSERT STENT  1/6/2014    Procedure: COMBINED CYSTOSCOPY, RETROGRADES, URETEROSCOPY, INSERT STENT;  Cystyoscopy place left ureteral stent;  Surgeon: Jaun Kimble MD;  Location: [facility] OR     COMBINED CYSTOSCOPY, RETROGRADES, URETEROSCOPY, INSERT STENT Left 10/23/2018    Procedure: Video cystoscopy, left ureteroscopy with stone extraction;  Surgeon: Bull Mast MD;  Location:  OR     CYSTOSCOPY, URETEROSCOPY, COMBINED Right 9/23/2015    Procedure: COMBINED CYSTOSCOPY, URETEROSCOPY;  Surgeon: ROME Jett MD;  Location: [facility] OR     ENT SURGERY       ESOPHAGOSCOPY, GASTROSCOPY, DUODENOSCOPY (EGD), COMBINED  4/8/2013    Procedure: COMBINED ESOPHAGOSCOPY, GASTROSCOPY, DUODENOSCOPY (EGD), BIOPSY SINGLE OR MULTIPLE;  Gastroscopy;  Surgeon: Peter Pickard MD;  Location: [facility] GI     ESOPHAGOSCOPY, GASTROSCOPY, DUODENOSCOPY (EGD), COMBINED Left 10/28/2014    Procedure: COMBINED ESOPHAGOSCOPY, GASTROSCOPY, DUODENOSCOPY (EGD), BIOPSY SINGLE OR MULTIPLE;  Surgeon: Narcisa Ramirez MD;  Location:  OR     ESOPHAGOSCOPY, GASTROSCOPY, DUODENOSCOPY (EGD), COMBINED N/A 12/24/2018    Procedure: COMBINED ESOPHAGOSCOPY, GASTROSCOPY, DUODENOSCOPY (EGD), BIOPSY SINGLE OR MULTIPLE;  Surgeon: Sonu Verduzco MD;  Location: [facility]     LAPAROSCOPIC CHOLECYSTECTOMY   11/20/2014    [facility], Dr. Ramirez     LASER HOLMIUM LITHOTRIPSY URETER(S), INSERT STENT, COMBINED  4/2/2014    Procedure: COMBINED CYSTOSCOPY, URETEROSCOPY, LASER HOLMIUM LITHOTRIPSY URETER(S), INSERT STENT;  Cystoscopy,Left Ureteral Stent Removal,Left Ureteroscopy with Laser Lithotripsy,Left Ureter Stent Placement;  Surgeon: ROME Jett MD;  Location: [facility] OR     Transurethral stone resection  03/11/2011     Family History   Problem Relation Age of Onset     Gastrointestinal Disease Father         Crohn's Disease     Cancer Father         Liver Cancer     Other Cancer Father         Liver     Cancer Maternal Grandmother         lympoma     Diabetes Maternal Grandmother      Mental Illness Maternal Grandmother      Other Cancer Maternal Grandmother         Non Hodgkins Lymphoma     Diabetes Maternal Grandfather      Hypertension Maternal Grandfather      Prostate Cancer Maternal Grandfather      Hyperlipidemia Maternal Grandfather      Heart Disease Paternal Grandfather         heart disease     Hypertension Brother      Other Cancer Brother         Esophagecial     Diabetes Brother      Hyperlipidemia Mother      Mental Illness Mother      Anxiety Disorder Mother      Anesthesia Reaction Mother         Vomiting/Nausea     Hypertension Brother      Other Cancer Brother      Other Cancer Paternal Half-Brother         Esophageal     Social History     Tobacco Use     Smoking status: Current Every Day Smoker     Packs/day: 1.00     Years: 5.00     Pack years: 5.00     Types: Dip, chew, snus or snuff, Other     Start date: 8/1/2011     Smokeless tobacco: Current User     Types: Chew     Last attempt to quit: 12/17/2019   Substance Use Topics     Alcohol use: No     Alcohol/week: 0.0 standard drinks     Drug use: No     Types: Opiates     Comment: oxy, heroin (smoke)     Social History as of 8/2020:  Grew up in [place], didn't graduate high school. Conduct and possession charges in history. Has had  drug-induced mood disorder, dextromethorphan right eye, and BPD diagnosis. Currently lives at [facility] and has HC CM at [facility] (Emily Rodriguez ([phone]). Her care is restricted to [facility]. Sober from Methamphetamines after 02/2020 relapse.  Relapsed 07/2020 with marijuana.    Social history as of 12/19/20 hospitalization:  on a civil commitment.  She has been residing at the [facility] with medication management with the staff members for guidance.   noted by mutual agreement, the patient would not be returning to the [facility].  The patient's  was supportive of the patient discharging to a friend's home until her CADI is established, at which time she can transition to a group home.     Current Outpatient Medications   Medication Sig Dispense Refill     ALMACONE -400-40 MG/5ML SUSP suspension TAKE 30MLS BY MOUTH EVERY 4 HOURS AS NEEDED FOR INDIGESTION 355 mL 0     cyproheptadine (PERIACTIN) 4 MG tablet Take 1 tablet (4 mg) by mouth At Bedtime (Patient not taking: Reported on 1/4/2021) 30 tablet 1     fluPHENAZine decanoate (PROLIXIN) 25 MG/ML injection INJECT 1ML (25MG) INTRAMUSCULARLY EVERY 14 DAYS 6 mL 0     gabapentin (NEURONTIN) 300 MG capsule TAKE 3 CAPSULES (900MG) AND TAKE 4 CAPSULES (1200MG ) BY MOUTH MIDDAY DAY AND TAKE 3 CAPSULES (900MG) BY MOUTH EVERY EVENING 300 capsule 11     HYDROcodone-acetaminophen (NORCO) 5-325 MG tablet Take 1 tablet by mouth every 6 hours as needed 10 tablet 0     ibuprofen (ADVIL/MOTRIN) 600 MG tablet Take 1 tablet (600 mg) by mouth every 8 hours as needed for moderate pain 30 tablet 0     ibuprofen (ADVIL/MOTRIN) 800 MG tablet Take 1 tablet (800 mg) by mouth every 8 hours as needed for moderate pain, fever or pain Take with food 60 tablet 1     lisdexamfetamine (VYVANSE) 40 MG capsule Take 40 mg by mouth every morning        lithium ER (LITHOBID) 300 MG CR tablet Take one tab (1) each morning and three (3) tabs at bedtime 120 tablet 0  "    norelgestromin-ethinyl estradiol (XULANE) 150-35 MCG/24HR patch Place 1 patch onto the skin once a week May use continuously 12 patch 4     OLANZapine (ZYPREXA) 2.5 MG tablet Take 1 tablet (2.5 mg) by mouth 3 times daily as needed (for anxiety or increased voices) 30 tablet 1     omeprazole (PRILOSEC) 20 MG DR capsule TAKE 1 CAPSULE BY MOUTH DAILY 30 capsule 3     ondansetron (ZOFRAN ODT) 4 MG ODT tab Take 1 tablet (4 mg) by mouth every 8 hours as needed 10 tablet 0     ondansetron (ZOFRAN-ODT) 4 MG ODT tab Take 1 tablet (4 mg) by mouth every 8 hours as needed for nausea 60 tablet 0     psyllium (METAMUCIL/KONSYL) capsule Take 1 capsule by mouth daily (Patient not taking: Reported on 1/4/2021) 90 capsule 3     QUEtiapine (SEROQUEL) 100 MG tablet Take 1 tablet (100 mg) by mouth At Bedtime 30 tablet 0     tamsulosin (FLOMAX) 0.4 MG capsule Take 1 capsule (0.4 mg) by mouth daily 7 capsule 0     Allergies   Allergen Reactions     Haldol [Haloperidol] Other (See Comments)     Makes patient very angry and anxious     Adhesive Tape Hives     Percocet [Oxycodone-Acetaminophen] Nausea and Vomiting     Prednisone Other (See Comments) and Hives     Suicidal ideation     Risperidone Other (See Comments)     Tramadol Hcl Nausea and Vomiting     Droperidol Anxiety     Seroquel [Quetiapine] Palpitations     Spent 2 weeks in the hospital due to having seroquel, caused palpitations and QT prolongation     BP Readings from Last 6 Encounters:   01/30/21 (!) 133/98   01/10/21 (!) 149/89   01/04/21 135/85   12/19/20 105/61   12/10/20 (!) 156/88   11/12/20 120/82     Wt Readings from Last 5 Encounters:   01/29/21 104.3 kg (230 lb)   01/10/21 104.3 kg (230 lb)   01/04/21 104.3 kg (230 lb)   12/14/20 104.3 kg (230 lb)   12/10/20 104.3 kg (230 lb)     Estimated body mass index is 37.12 kg/m  as calculated from the following:    Height as of 1/29/21: 1.676 m (5' 6\").    Weight as of 1/29/21: 104.3 kg (230 lb).  Gen:  On video, Ayana" appears alert, and is actively engaged in conversation.  No acute distress.  Speech fluent.  No psychomotor slowing, does not appear anxious, no signs of agitation.    Ayana was seen today for consult.    Diagnoses and all orders for this visit:    Gender dysphoria  -     COMPREHENSIVE GENDER CARE REFERRAL; Future      Primary Care provider will remain SUSANNAH Avery. Patient is in the restricted recipient care program.  Referrals to the Gender Care program to follow.    Taylor Hugo M.D.  Internal Medicine  [facility]     Patients: if you have questions or concerns about this progress note, please discuss them with the provider at a future office visit.

## 2021-02-01 NOTE — PROGRESS NOTES
"This patient is being evaluated via a billable video visit as an alternative to an in-person visit.       The patient has been notified of following:     \"This video visit will be conducted via a call between you and your physician/provider. We have found that certain health care needs can be provided without the need for an in-person physical exam.  This service lets us provide the care you need with a video conversation.  If a prescription is necessary we can send it directly to your pharmacy.  If lab work is needed we can place an order for that and you can then stop by our lab to have the test done at a later time. If during the course of the call the physician/provider feels a video visit is not appropriate, you will not be charged for this service.\"     Patient has given verbal consent for virtual video visit? Yes  Did patient initiate this virtual visit? Yes    Person spoken to:  Patient    This was a synchronous virtual visit  Location of patient: home  Location of physician:  home office  Department name:  Medicine  Mode of communication:  Video Conference via D[employer]    Time video initiated:  12:35  Time video ended:  12: 50  Total length of video visit: 15 min    Taylor Hugo M.D.  Internal Medicine  [facility]       Patients: if you have questions or concerns about this progress note, please discuss them with the provider at a future office visit.    "

## 2021-02-02 ENCOUNTER — TELEPHONE (OUTPATIENT)
Dept: NEUROSURGERY | Facility: CLINIC | Age: 31
End: 2021-02-02

## 2021-02-03 NOTE — TELEPHONE ENCOUNTER
SPINE PATIENTS - NEW PROTOCOL PREVISIT    RECORDS RECEIVED FROM: Internal   Date of Appt: 2/4/21   NOTES (FOR ALL VISITS) STATUS DETAILS   OFFICE NOTE from referring provider Internal Becki Avery NP @  [facility]:  1/30/21 mychart encounter   OFFICE NOTE from other specialist N/A    DISCHARGE SUMMARY from hospital N/A    DISCHARGE REPORT from ER Internal [facility]:  1/29/21   EMG REPORT N/A    MEDICATION LIST Internal    IMAGING  (FOR ALL VISITS)     MRI (HEAD, NECK, SPINE) Internal [facility]:  MRI Lumbar SPine 1/29/21   XRAY (SPINE) *NEUROSURGERY* N/A    CT (HEAD, NECK, SPINE) N/A

## 2021-02-04 ENCOUNTER — MYC MEDICAL ADVICE (OUTPATIENT)
Dept: FAMILY MEDICINE | Facility: CLINIC | Age: 31
End: 2021-02-04

## 2021-02-04 ENCOUNTER — VIRTUAL VISIT (OUTPATIENT)
Dept: NEUROSURGERY | Facility: CLINIC | Age: 31
End: 2021-02-04
Payer: MEDICAID

## 2021-02-04 ENCOUNTER — TELEPHONE (OUTPATIENT)
Dept: NEUROSURGERY | Facility: CLINIC | Age: 31
End: 2021-02-04

## 2021-02-04 ENCOUNTER — PRE VISIT (OUTPATIENT)
Dept: NEUROSURGERY | Facility: CLINIC | Age: 31
End: 2021-02-04

## 2021-02-04 ENCOUNTER — VIRTUAL VISIT (OUTPATIENT)
Dept: FAMILY MEDICINE | Facility: CLINIC | Age: 31
End: 2021-02-04
Payer: MEDICAID
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
## 2021-02-04 DIAGNOSIS — M54.9 INTRACTABLE BACK PAIN: Primary | ICD-10-CM

## 2021-02-04 DIAGNOSIS — J34.89 NASAL PAIN: Primary | ICD-10-CM

## 2021-02-04 DIAGNOSIS — G83.4 CAUDA EQUINA SYNDROME WITH NEUROGENIC BLADDER (H): ICD-10-CM

## 2021-02-04 DIAGNOSIS — G89.29 CHRONIC BILATERAL LOW BACK PAIN WITHOUT SCIATICA: ICD-10-CM

## 2021-02-04 DIAGNOSIS — M54.50 CHRONIC BILATERAL LOW BACK PAIN WITHOUT SCIATICA: ICD-10-CM

## 2021-02-04 PROCEDURE — 99204 OFFICE O/P NEW MOD 45 MIN: CPT | Mod: 95 | Performed by: NURSE PRACTITIONER

## 2021-02-04 PROCEDURE — 99441 PR PHYSICIAN TELEPHONE EVALUATION 5-10 MIN: CPT | Performed by: PHYSICIAN ASSISTANT

## 2021-02-04 RX ORDER — NEOMYCIN/BACITRACIN/POLYMYXINB 3.5-400-5K
OINTMENT (GRAM) TOPICAL 2 TIMES DAILY PRN
Qty: 15 G | Refills: 0 | Status: SHIPPED | OUTPATIENT
Start: 2021-02-04 | End: 2021-02-23

## 2021-02-04 RX ORDER — MELOXICAM 15 MG/1
15 TABLET ORAL DAILY
Qty: 15 TABLET | Refills: 1 | Status: SHIPPED | OUTPATIENT
Start: 2021-02-04 | End: 2021-03-19

## 2021-02-04 RX ORDER — LISDEXAMFETAMINE DIMESYLATE 50 MG/1
50 CAPSULE ORAL EVERY MORNING
COMMUNITY
End: 2021-04-19

## 2021-02-04 RX ORDER — MELOXICAM 15 MG/1
15 TABLET ORAL DAILY
Qty: 30 TABLET | Refills: 1 | Status: SHIPPED | OUTPATIENT
Start: 2021-02-04 | End: 2021-02-04

## 2021-02-04 RX ORDER — CLONAZEPAM 1 MG/1
1 TABLET ORAL 2 TIMES DAILY PRN
COMMUNITY
End: 2021-04-19
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
## 2021-02-04 NOTE — PATIENT INSTRUCTIONS
~Stop Ibuprofen, trial a Short course of anti-inflammatory, Mobic 15 mg daily   (will check w/pharmacist due to concern for drug interaction with Lithium)  ~Obtain lumbar spine flexion/extension xrays to r/o instability or movement   ~Selective nerve root block of the left L5 w/pain management clinic  ~Return to Neurosurgery clinic with virtual video appointment to update pain/symptoms following above, in approx 4-6 weeks      At the end of the visit, all the patient's questions and concerns had been addressed and the patient was agreeable with the plan of care as outlined above. The patient has our office contact information at [phone], and knows to call with any questions, concerns, or changes in condition.
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
## 2021-02-04 NOTE — PATIENT INSTRUCTIONS
Clyde Piña,    Thank you for allowing [facility] to manage your care.    I sent your prescriptions to your pharmacy.    Use a humidifier in your room while sleeping. Drink 8-10 glasses of fluid daily to stay well-hydrated.    See Becki as needed if not improving.    If you have any questions or concerns, please feel free to call us at [phone]    Sincerely,    Adolfo Chavis PA-C    Did you know?      You can schedule a video visit for follow-up appointments as well as future appointments for certain conditions.  Please see the below link.     [email or web address]    If you have not already done so,  I encourage you to sign up for [email or web address]  This will allow you to review your results, securely communicate with a provider, and schedule virtual visits as well.

## 2021-02-04 NOTE — PROGRESS NOTES
Ayana is a 30 year old who is being evaluated via a billable telephone visit.      What phone number would you like to be contacted at? [phone]  How would you [facility] to [facility]? [facility]  Assessment & Plan   Problem List Items Addressed This Visit     None      Visit Diagnoses     Nasal pain    -  Primary    Relevant Medications    neomycin-bacitracin-polymyxin (NEOSPORIN) 5-400-5000 ointment         Impression is discolored nasal mucus and pain to the nares from unknown etiology. No symptoms of sinusitis or other URI. Considered pseudomonal infection and I feel it is reasonable to treat with topical triple antibiotic ointment which should help with bacterial overgrowth (including Pseudomonas spp) and dry nares. She will purchase a humidifier for her room. She requested that the referral to neurosurgery be faxed to Medicaid. This was done.    DDx and Dx discussed with and explained to the pt to their satisfaction.  All questions were answered at this time. Pt expressed understanding of and agreement with this dx, tx, and plan. No further workup warranted and standard medication warnings given. I have given the patient a list of pertinent indications for re-evaluation. Will go to the Emergency Department if symptoms worsen or new concerning symptoms arise. Patient left in no apparent distress.     11 minutes spent on the date of the encounter doing chart review, history and exam, documentation and further activities as noted above    See Patient Instructions    Return if symptoms worsen or fail to improve.    VASILE Rosen  [facility] SHAILESH Piña is a 30 year old who presents to clinic today for the following health issues:    HPI       Concern - Blue snot  Onset: 1 week  Description: snot is blue when she blows nose. No changes in smelling. No recent antibiotics or other new medicines.  Intensity: moderate  Progression of Symptoms:  same  Accompanying Signs &  Symptoms: burning  Previous history of similar problem: No  Precipitating factors:        Worsened by: No  Alleviating factors:        Improved by: No  Therapies tried and outcome: None    Review of Systems   Constitutional, HEENT systems are negative, except as otherwise noted.      Objective           Vitals:  No vitals were obtained today due to virtual visit.    Physical Exam   healthy, alert and no distress  PSYCH: Alert and oriented times 3; coherent speech, normal   rate and volume, able to articulate logical thoughts, able   to abstract reason, no tangential thoughts, no hallucinations   or delusions  Her affect is normal and pleasant  RESP: No cough, no audible wheezing, able to talk in full sentences  Remainder of exam unable to be completed due to telephone visits     Phone call duration: 6 minutes

## 2021-02-09 ENCOUNTER — AMBULATORY - HEALTHEAST (OUTPATIENT)
Dept: BEHAVIORAL HEALTH | Facility: CLINIC | Age: 31
End: 2021-02-09

## 2021-02-09 ENCOUNTER — OFFICE VISIT - HEALTHEAST (OUTPATIENT)
Dept: BEHAVIORAL HEALTH | Facility: CLINIC | Age: 31
End: 2021-02-09

## 2021-02-09 ENCOUNTER — MYC MEDICAL ADVICE (OUTPATIENT)
Dept: FAMILY MEDICINE | Facility: CLINIC | Age: 31
End: 2021-02-09

## 2021-02-09 DIAGNOSIS — F64.9 GENDER IDENTITY DISORDER: Primary | ICD-10-CM

## 2021-02-09 DIAGNOSIS — F25.9 SCHIZOAFFECTIVE DISORDER, CHRONIC CONDITION (H): ICD-10-CM

## 2021-02-12 ENCOUNTER — TELEPHONE (OUTPATIENT)
Dept: PLASTIC SURGERY | Facility: CLINIC | Age: 31
End: 2021-02-12

## 2021-02-12 DIAGNOSIS — F64.0 GENDER DYSPHORIA IN ADOLESCENT AND ADULT: Primary | ICD-10-CM

## 2021-02-12 NOTE — TELEPHONE ENCOUNTER
[facility] :  Care Coordination Note     SITUATION   Pt (Prakash, he/him) is a 30 year old adult who is receiving support for:  Care Team  .    BACKGROUND     Called pt regarding interest in top surgery and issues with hormone referral. Offered further hormone resources to pt, scheduled pt for consult with Dr. Mayers 9/14/21.     ASSESSMENT     Surgery              [facility] Assessment  [facility] ([facility]) Enrollment: (P) Enrolled  Patient has a therapist: (P) No  Letter of support #1: (P) Requested  Surgery being considered: (P) Yes  Mastectomy: (P) Yes    Pt reports:    Daily vaping  No diabetes  Working on starting HRT  No previous gender confirming surgeries  Not currently seeing therapist    Writer to send Claudia's information and therapist referrals via [facility]      PLAN          Nursing Interventions:  [facility] assessment completed    Follow-up plan:    1. Writer to send referrals via [facility]    2. Obtain SHANE Zarate

## 2021-02-15 ENCOUNTER — ANCILLARY PROCEDURE (OUTPATIENT)
Dept: GENERAL RADIOLOGY | Facility: CLINIC | Age: 31
End: 2021-02-15
Attending: NURSE PRACTITIONER
Payer: MEDICAID

## 2021-02-15 DIAGNOSIS — G89.29 CHRONIC BILATERAL LOW BACK PAIN WITHOUT SCIATICA: ICD-10-CM

## 2021-02-15 DIAGNOSIS — M54.50 CHRONIC BILATERAL LOW BACK PAIN WITHOUT SCIATICA: ICD-10-CM

## 2021-02-15 DIAGNOSIS — M54.9 INTRACTABLE BACK PAIN: ICD-10-CM
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
## 2021-02-15 PROCEDURE — 72120 X-RAY BEND ONLY L-S SPINE: CPT | Performed by: RADIOLOGY

## 2021-02-16 ENCOUNTER — VIRTUAL VISIT (OUTPATIENT)
Dept: FAMILY MEDICINE | Facility: CLINIC | Age: 31
End: 2021-02-16
Payer: MEDICAID

## 2021-02-16 ENCOUNTER — MYC MEDICAL ADVICE (OUTPATIENT)
Dept: FAMILY MEDICINE | Facility: CLINIC | Age: 31
End: 2021-02-16

## 2021-02-16 DIAGNOSIS — R44.0 AUDITORY HALLUCINATIONS: Primary | ICD-10-CM

## 2021-02-16 DIAGNOSIS — F64.9 GENDER IDENTITY DISORDER: ICD-10-CM

## 2021-02-16 DIAGNOSIS — F25.0 SCHIZOAFFECTIVE DISORDER, BIPOLAR TYPE (H): ICD-10-CM

## 2021-02-16 PROCEDURE — 99214 OFFICE O/P EST MOD 30 MIN: CPT | Mod: GT | Performed by: NURSE PRACTITIONER

## 2021-02-16 RX ORDER — ARIPIPRAZOLE 10 MG/1
TABLET ORAL DAILY
COMMUNITY
Start: 2020-12-21 | End: 2021-02-23

## 2021-02-16 RX ORDER — DEXTROAMPHETAMINE SACCHARATE, AMPHETAMINE ASPARTATE, DEXTROAMPHETAMINE SULFATE AND AMPHETAMINE SULFATE 2.5; 2.5; 2.5; 2.5 MG/1; MG/1; MG/1; MG/1
1 TABLET ORAL
COMMUNITY
Start: 2021-02-04 | End: 2021-04-19

## 2021-02-16 ASSESSMENT — PATIENT HEALTH QUESTIONNAIRE - PHQ9
5. POOR APPETITE OR OVEREATING: NOT AT ALL
SUM OF ALL RESPONSES TO PHQ QUESTIONS 1-9: 0

## 2021-02-16 ASSESSMENT — ENCOUNTER SYMPTOMS: HALLUCINATIONS: 1

## 2021-02-16 ASSESSMENT — ANXIETY QUESTIONNAIRES
2. NOT BEING ABLE TO STOP OR CONTROL WORRYING: NOT AT ALL
6. BECOMING EASILY ANNOYED OR IRRITABLE: NOT AT ALL
7. FEELING AFRAID AS IF SOMETHING AWFUL MIGHT HAPPEN: NOT AT ALL
IF YOU CHECKED OFF ANY PROBLEMS ON THIS QUESTIONNAIRE, HOW DIFFICULT HAVE THESE PROBLEMS MADE IT FOR YOU TO DO YOUR WORK, TAKE CARE OF THINGS AT HOME, OR GET ALONG WITH OTHER PEOPLE: NOT DIFFICULT AT ALL
GAD7 TOTAL SCORE: 2
5. BEING SO RESTLESS THAT IT IS HARD TO SIT STILL: NOT AT ALL
3. WORRYING TOO MUCH ABOUT DIFFERENT THINGS: SEVERAL DAYS
1. FEELING NERVOUS, ANXIOUS, OR ON EDGE: SEVERAL DAYS
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
## 2021-02-16 NOTE — TELEPHONE ENCOUNTER
Routed to provider to please advise.    Tran BSN-RN  Triage Nurse  [facility]: [facility]

## 2021-02-17 ASSESSMENT — ANXIETY QUESTIONNAIRES: GAD7 TOTAL SCORE: 2

## 2021-02-18 ENCOUNTER — TRANSFERRED RECORDS (OUTPATIENT)
Dept: HEALTH INFORMATION MANAGEMENT | Facility: CLINIC | Age: 31
End: 2021-02-18

## 2021-02-18 ENCOUNTER — TELEPHONE (OUTPATIENT)
Dept: ENDOCRINOLOGY | Facility: CLINIC | Age: 31
End: 2021-02-18

## 2021-02-18 NOTE — TELEPHONE ENCOUNTER
Called informing pt to complete questionnaire on MyChart prior to visit.  Pt is scheduled with Dr Hays at 9:00 am     Talked with patient will call here 20-30 minutes prior to her appointment

## 2021-02-19 ENCOUNTER — VIRTUAL VISIT (OUTPATIENT)
Dept: ENDOCRINOLOGY | Facility: CLINIC | Age: 31
End: 2021-02-19
Payer: MEDICAID

## 2021-02-19 ENCOUNTER — MYC MEDICAL ADVICE (OUTPATIENT)
Dept: FAMILY MEDICINE | Facility: CLINIC | Age: 31
End: 2021-02-19

## 2021-02-19 ENCOUNTER — TELEPHONE (OUTPATIENT)
Dept: PSYCHOLOGY | Facility: CLINIC | Age: 31
End: 2021-02-19

## 2021-02-19 VITALS — BODY MASS INDEX: 36.48 KG/M2 | HEIGHT: 66 IN | WEIGHT: 227 LBS

## 2021-02-19 DIAGNOSIS — E66.01 MORBID OBESITY DUE TO EXCESS CALORIES (H): Primary | ICD-10-CM

## 2021-02-19 PROCEDURE — 99215 OFFICE O/P EST HI 40 MIN: CPT | Mod: 95 | Performed by: INTERNAL MEDICINE

## 2021-02-19 ASSESSMENT — MIFFLIN-ST. JEOR: SCORE: 1766.42

## 2021-02-19 ASSESSMENT — PAIN SCALES - GENERAL: PAINLEVEL: NO PAIN (0)
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
## 2021-02-19 NOTE — NURSING NOTE
"Chief Complaint   Patient presents with     Consult     Consultation Weight Management       Vitals:    02/19/21 0846   Weight: 103 kg (227 lb)   Height: 1.676 m (5' 6\")       Body mass index is 36.64 kg/m .                     "
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
## 2021-02-19 NOTE — TELEPHONE ENCOUNTER
Date: 2021    Client Name:  Ayana Prasad  Preferred Name: Prakash  MRN: [id number]   : 1990  Age:  30 year old    Presenting Problem / Reason for Assessment:   LOS for top surgery    Suggested Program: TG    Interested in Couples/Family Therapy?  No    Any legal charges pending?:   No    Patient wishes to be contacted regarding Insurance benefits?: No    Insurance Benefits to be evaluated.  Note will be entered when validated.    Intake forms returned via [employer]  BW

## 2021-02-19 NOTE — PATIENT INSTRUCTIONS
Thank you for allowing us the privilege of caring for you. We hope we provided you with the excellent service you deserve.    Please let us know if there is anything else we can do for you so that we can be sure you are completely satisfied with your care experience.    Your visit was with Dr. Gil today.    Instructions per today's visit:  We discussed Contrave or its components, and possibly a re-trial of topiramate though that did not provide benefit earlier at unknown dose; please let us know if your psychiatrist is OK with the Contrave option (information on both is below)    We are planning the following follow ups--  --health coaching  --nutrition  --med mgmt pharm visit in about 6 wks  --Dr. Gil in 3 mo    Please call our contact center at [phone] to schedule your next appointments.    Meal Replacement Products:    Here is the link to our new e-store where you can purchase our meal replacement products    [email or web address]    The one week starter kit is a great way to sample a variety of products and see what works for you.    If you want more information about the product go to: [employer]    Free Shipping for orders over $75    Benefits of meal replacements products:    Portion and calorie control  Improved nutrition  Structured eating  Simplified food choices  Avoid contact with trigger foods    Interested in working with a health ?  Health coaches work with you to improve your overall health and wellbeing.  They look at the whole person, and may involve discussion of different areas of life, including, but not limited to the four pillars of health (sleep, exercise, nutrition, and stress management). Discuss with your care team if you would like to start working a health .    Health Coaching-3 Pack: Schedule by calling [phone]    $99 for three health coaching visits    Visits may be done in person or via phone    Coaching  is a partnership between the  and the client; Coaches do not prescribe or diagnose    Coaching helps inspire the client to reach his/her personal goals    Bluetooth Scale:    We hope to provide you with high quality telephone and virtual healthcare visits while social distancing for COVID-19 is necessary, as well as in the future when virtual visits may be more convenient for you.    Our technology team made it possible for Bluetooth scales to send weight measurements to our electronic medical record. This allows weights from you weighing at home to securely flow into the medical record, which will improve telephone and virtual visits.  Additionally, studies have shown that adults actually lose more weight when their weights are automatically sent to someone else, and also that this process is not stressful for those adults.    Below is a link for purchasing the scale, with a discount code for our patients. You may call your insurance company to see if they will reimburse you for the cost of the scale, as a piece of durable medical equipment. The scales only go up to a weight of 400 pounds. This is an issue and we are working with the developer on increasing this. We found no scales that go over 400lb that have blue-tooth for connecting to rocket staff.    Scale to purchase: the [employer]  Body  Scale: [email or web address]=aw.ds    Discount Code: We have a discount code for our patients to bring the cost down to $50, the code is:  withmed     Steps to link the scale to rocket staff via an Android Phone (you can always disconnect at any point in the future):  1. The order must be placed first before the patient can access Track My Health within rocket staff.  2. Download Google Fit hiren from the Google Play Store  a. Log in or register using your Google account  3. Download the rocket staff hiren from Google Play Store  a. Select add  organization  b. Search for [employer] and select it  c. Log into [employer]  d. Select Track SpeakPhone Health  e. Select the green connect my account button  f. When prompted log into your Google account  g. Select okay to confirm the account  4. Download the Gel[employer] Health Mate ledy from Google Play Store  a. [place] [employer]  b. Go to profile  c. Tap google fit under the Apps section  d. Select the option to activate Google Fit integration  e. Select the same Google account  f. Select okay to confirm the account  g.  Steps to link the scale to [employer] via an iPhone (you can always disconnect at any point in the future):  **Note Kudarom is not available for download on an iPad**  1. The order must be placed first before the patient can access [employer] within [employer].  2. Locate the Health ledy on your iPhone.  a. Set up your Apple Health account as prompted  b. The Sources page will show Apps that communicate with your Health ledy. Once all steps are completed, you should see [facility] and [employer] listed under the Apps section and your iPhone under the devices section.  i. Select Health Mate  1. Under 'ALLOW  HEALTH [employer]  TO WRITE DATA ensure the toggle is on for Weight.  2. This will allow the scale to add your weight to the Apple Health  ii. Select Booky[employer]art  1. Under 'ALLOW  [email or web address]  TO READ DATA ensure the toggle is on for Weight.  2. This allows [employer] to grab the weight from Kudarom so your provider can see your weights.  3. Download the JetPayt ledy from the Ledy Store  a. Select add organization                                                  b. Search for [employer] and select it  c. Log into [employer]  d. Select Track SpeakPhone Health  e. Select the green connect my account button  f. Follow prompts to link your device to [employer].  4. Download the Gelesis health mate ledy in the Ledy Store  a. [place] [employer]  b. Go to profile  c. Tap Health under the Apps section  d. If prompted to allow access with  the Health Ledy, toggle weight on for read and write access.      For any questions/concerns contact Yaquelin Vaz LPN at [phone]    To schedule appointments with our team, please call [phone]    Please call during clinic hours Monday through Friday 8:00a - 4:00p if you have questions or you can contact us via [employer] at anytime.      Lab results will be communicated through My Chart or letter (if My Chart not used). Please call the clinic if you have not received communication after 1 week or if you have any questions.    Clinic Fax: [phone]    Thank you,  Medical Weight Management Team        We are considering starting Contrave. Contrave is specifically prescribed for obesity. It is a combination of two medications, Naltrexone and Bupropione (Wellbutrin). The Bupropion helps lessen appetite and the Naltrexone works by blocking certain receptors in the brain and curbing cravings.      For some of our patients the medication works right away. Other patients don't feel much of a change but find they've lost weight. Like all weight loss medications, Contrave works best when you help it work. This means:  1. Having less tempting high calorie (fattening) food around the house or office. (For people with strong cravings this is very important.)   2. Staying away from situations or people that may trigger your cravings .   3. Eating out only one time or less each week.  4. Eating your meals at a table with the TV or computer off.    WARNING: This medication blocks the action of opioid type pain medications. If you routinely take any medication like Codeine, Oxycontin, Percocet, Morphine, Dilaudid or Methodone, do not take this until you have talked with weight management staff.     FYI- If you are planning on having an elective surgery, you should start titrating yourself off Contrave 4 weeks prior to surgery. This would entail decreasing the same way you started the medication, by taking one tablet less  per week for 4 weeks, until you are able to stop the medication. If you need to stop it quicker, you can take 1 tablet in the morning and 1 tablet in the evening for 2 weeks, and then stop the medication. Please don't hesitate to call if you have any questions regarding this.    Dosing as follows:  Week 1- 1 tablet in the morning  Week 2- 1 tablet in the morning and 1 tablet at bedtime  Week 3- 2 tablets in the morning and 1 tablet at bedtime  Week 4 and thereafter- 2 tablets in the morning and 2 tablets at bedtime    Side-effects: The most common side effect include: nausea; constipation; headache; vomiting; dizziness; diarrhea; trouble sleeping; and dry mouth.     This medication typically isn t covered by insurance and will require a prior authorization, which can take 1-2 weeks to review. Patients can visit [email or web address].com and sign up for the Pharmacy savings program and receive the medication for $60-70 per month for 12 months if eligible.    For any questions/concerns contact Yaquelin Lopez LPN at [phone]     (Do not stop taking it if you don't think it's working. For some people it works without them knowing it.)      In order to get refills of this or any medication we prescribe you must be seen in the medical weight mgmt clinic every 2-4 months. Please have your pharmacy fax a refill request.        We could potentially re-try topiramate at suppertime.  Start one tab, 25 mg, for a week. Go up to 50 mg (2 tabs) for the next week. At the third week, take   3 tabs (75 mg).  Stay at 3 tabs until you are seen again. Call the nurse  if you have any questions or concerns. (Do not stop taking it if you don't think it's working. For some people it works even though they do not feel much different.)    Topiramate (Topamax) is a medication that is used most often to treat migraine headaches or for seizures. It has also been found to help with weight loss. Although it's not currently FDA approved for weight loss,  it has been used safely for a number of years to help people who are carrying extra weight.     Just how topiramate helps with weight loss has not been exactly determined. However it seems to work on areas of the brain to quiet down signals related to eating.      Topiramate may make you:    >feel less interest in eating in between meals   >think less about food and eating   >find it easier to push the plate away   >find giving up pop easier    >have an easier time eating less    For some of our patients, the pills work right away. They feel and think quite differently about food. Other patients don't feel much of a change but find in fact they have lost weight! Like all weight loss medications, topiramate works best when you help it work.  This means:    1) Have less tempting high calorie (fattening) food around the house or office    2) Have lower calorie food (fruits, vegetables,low fat meats and dairy) for snacks    3) Eat out only one time or less each week.   4) Eat your meals at a table with the TV or computer off.    Side-effects. Topiramate is generally well tolerated. The main side-effects we see are:   Tingling in hands,feet, or face (usually not very troublesome)   Mental confusion and word finding trouble (about 10% of patients have this.)     Feeling sleepy or a bit dopey- this goes away very soon after starting.    One of the dangers of topiramate is the possibility of birth defects--if you get pregnant when you are on it, there is the risk that your baby will be born with a cleft lip or palate.  If you are on topiramate and of child bearing age, you need to be on a reliable form of birth control or refrain from sexual intercourse.     Please refer to the pharmacy insert for more information on side-effects. Since many pharmacists are not familiar with the use of topiramate in weight loss, calling the clinic will get you the most accurate information on the use of this medication for weight  loss.     In order to get refills of this or any medication we prescribe you must be seen in the medical weight mgmt clinic every 2-3 months. Please have your pharmacy fax a refill request.

## 2021-02-23 ENCOUNTER — MYC MEDICAL ADVICE (OUTPATIENT)
Dept: FAMILY MEDICINE | Facility: CLINIC | Age: 31
End: 2021-02-23

## 2021-02-23 ENCOUNTER — TELEPHONE (OUTPATIENT)
Dept: FAMILY MEDICINE | Facility: CLINIC | Age: 31
End: 2021-02-23

## 2021-02-23 DIAGNOSIS — G89.29 CHRONIC BILATERAL LOW BACK PAIN WITHOUT SCIATICA: Primary | ICD-10-CM

## 2021-02-23 DIAGNOSIS — H53.9 VISION CHANGES: Primary | ICD-10-CM

## 2021-02-23 DIAGNOSIS — M54.50 CHRONIC BILATERAL LOW BACK PAIN WITHOUT SCIATICA: Primary | ICD-10-CM

## 2021-02-23 DIAGNOSIS — M54.17 LUMBOSACRAL RADICULOPATHY AT L5: Primary | ICD-10-CM

## 2021-02-23 DIAGNOSIS — N92.0 MENORRHAGIA WITH REGULAR CYCLE: ICD-10-CM

## 2021-02-23 DIAGNOSIS — M54.9 INTRACTABLE BACK PAIN: ICD-10-CM

## 2021-02-23 RX ORDER — OLANZAPINE 2.5 MG/1
2.5 TABLET, FILM COATED ORAL 2 TIMES DAILY PRN
COMMUNITY
End: 2021-10-08

## 2021-02-23 RX ORDER — ZOLPIDEM TARTRATE 5 MG/1
5 TABLET ORAL AT BEDTIME
COMMUNITY
End: 2021-04-19

## 2021-02-23 NOTE — TELEPHONE ENCOUNTER
Left message with Domenica, she is the nurse for the group home that Prakash lives at. We need a fax or email that we can send an updated medication list to.    Note: Patient will be sending new psych meds/notes through [employer], them med list must be updated before sending.

## 2021-02-24 ENCOUNTER — TELEPHONE (OUTPATIENT)
Dept: ENDOCRINOLOGY | Facility: CLINIC | Age: 31
End: 2021-02-24

## 2021-02-24 ENCOUNTER — TELEPHONE (OUTPATIENT)
Dept: FAMILY MEDICINE | Facility: CLINIC | Age: 31
End: 2021-02-24

## 2021-02-24 NOTE — TELEPHONE ENCOUNTER
Forms received from: [employer]   Phone number listed: [phone]   Fax listed: [phone]  Date received: 02/24/2021  Form description: physician attestation  Once forms are completed, please return to [employer] via fax.  Form placed: in providers in [place]

## 2021-02-24 NOTE — TELEPHONE ENCOUNTER
Call placed to son to discuss preferred name.  To protect privacy would recommend changing chart name back to Ayana until has come out to everyone.  Declines and wishes for chart name to continue to be Prakash.  Understands that ancillary staff will refer to him as Prakash and when calls are placed to group home will be referred to as Prakash.  Understands and is now okay with group home knowing that preferred name is Prakash.     BROOKLYN Edwards CNP

## 2021-02-24 NOTE — TELEPHONE ENCOUNTER
LVM with call center number for pt to schedule video visit with RD in weight mgmt. With Lynda Clay or Sahara Morin for consult per Dr. Hays.

## 2021-02-26 ENCOUNTER — VIRTUAL VISIT (OUTPATIENT)
Dept: PSYCHOLOGY | Facility: CLINIC | Age: 31
End: 2021-02-26
Payer: MEDICAID

## 2021-02-26 ENCOUNTER — MYC MEDICAL ADVICE (OUTPATIENT)
Dept: FAMILY MEDICINE | Facility: CLINIC | Age: 31
End: 2021-02-26

## 2021-02-26 DIAGNOSIS — F64.0 GENDER DYSPHORIA IN ADOLESCENT AND ADULT: Primary | ICD-10-CM

## 2021-02-26 DIAGNOSIS — Z72.0 NICOTINE ABUSE: Primary | ICD-10-CM

## 2021-02-26 DIAGNOSIS — F41.1 GENERALIZED ANXIETY DISORDER: ICD-10-CM

## 2021-02-26 DIAGNOSIS — F25.0 SCHIZOAFFECTIVE DISORDER, BIPOLAR TYPE (H): ICD-10-CM

## 2021-02-26 DIAGNOSIS — F43.10 POSTTRAUMATIC STRESS DISORDER: ICD-10-CM

## 2021-02-26 DIAGNOSIS — F90.9 ATTENTION DEFICIT HYPERACTIVITY DISORDER (ADHD), UNSPECIFIED ADHD TYPE: ICD-10-CM

## 2021-02-26 PROCEDURE — 90791 PSYCH DIAGNOSTIC EVALUATION: CPT | Mod: HN | Performed by: STUDENT IN AN ORGANIZED HEALTH CARE EDUCATION/TRAINING PROGRAM

## 2021-02-26 PROCEDURE — 99207 PR NO BILLABLE SERVICE THIS VISIT: CPT | Performed by: STUDENT IN AN ORGANIZED HEALTH CARE EDUCATION/TRAINING PROGRAM
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
## 2021-02-26 NOTE — TELEPHONE ENCOUNTER
Routed to provider to please advise.    Tran BSN-RN  Triage Nurse  [facility]: [facility]

## 2021-02-26 NOTE — TELEPHONE ENCOUNTER
Tarsha message sent to patient for clarification.    Tran BSN-RN  Triage Nurse  [facility]: [facility]

## 2021-02-27 ENCOUNTER — MYC MEDICAL ADVICE (OUTPATIENT)
Dept: FAMILY MEDICINE | Facility: CLINIC | Age: 31
End: 2021-02-27

## 2021-02-27 NOTE — PROGRESS NOTES
I did not personally see the patient but I have reviewed and agree with the assessment and plan as documented in this note.  Jada Coello, PhD -- Supervisor   Licensed Psychologist
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
## 2021-03-01 ENCOUNTER — TELEPHONE (OUTPATIENT)
Dept: FAMILY MEDICINE | Facility: CLINIC | Age: 31
End: 2021-03-01

## 2021-03-01 ENCOUNTER — OFFICE VISIT (OUTPATIENT)
Dept: FAMILY MEDICINE | Facility: CLINIC | Age: 31
End: 2021-03-01
Payer: MEDICAID

## 2021-03-01 VITALS
WEIGHT: 229.6 LBS | DIASTOLIC BLOOD PRESSURE: 97 MMHG | TEMPERATURE: 97.7 F | BODY MASS INDEX: 36.9 KG/M2 | HEART RATE: 106 BPM | HEIGHT: 66 IN | OXYGEN SATURATION: 96 % | SYSTOLIC BLOOD PRESSURE: 150 MMHG

## 2021-03-01 DIAGNOSIS — F64.9 GENDER IDENTITY DISORDER: Primary | ICD-10-CM

## 2021-03-01 DIAGNOSIS — M54.9 INTRACTABLE BACK PAIN: ICD-10-CM

## 2021-03-01 DIAGNOSIS — Z11.59 ENCOUNTER FOR SCREENING FOR OTHER VIRAL DISEASES: ICD-10-CM

## 2021-03-01 DIAGNOSIS — N93.8 DUB (DYSFUNCTIONAL UTERINE BLEEDING): ICD-10-CM

## 2021-03-01 DIAGNOSIS — L70.9 ACNE, UNSPECIFIED ACNE TYPE: ICD-10-CM

## 2021-03-01 DIAGNOSIS — Z13.9 SCREENING FOR CONDITION: ICD-10-CM

## 2021-03-01 LAB
ALBUMIN SERPL-MCNC: 3.1 G/DL (ref 3.4–5)
ALP SERPL-CCNC: 56 U/L (ref 40–150)
ALT SERPL W P-5'-P-CCNC: 21 U/L (ref 0–50)
AST SERPL W P-5'-P-CCNC: 12 U/L (ref 0–45)
BILIRUB DIRECT SERPL-MCNC: <0.1 MG/DL (ref 0–0.2)
BILIRUB SERPL-MCNC: 0.2 MG/DL (ref 0.2–1.3)
CHOLEST SERPL-MCNC: 188 MG/DL
GLUCOSE SERPL-MCNC: 112 MG/DL (ref 70–99)
HDLC SERPL-MCNC: 66 MG/DL
HEMOGLOBIN: 12.2 G/DL (ref 11.7–15.7)
LDLC SERPL CALC-MCNC: 51 MG/DL
NONHDLC SERPL-MCNC: 122 MG/DL
PROT SERPL-MCNC: 7.4 G/DL (ref 6.8–8.8)
TRIGL SERPL-MCNC: 358 MG/DL

## 2021-03-01 PROCEDURE — 84403 ASSAY OF TOTAL TESTOSTERONE: CPT | Performed by: STUDENT IN AN ORGANIZED HEALTH CARE EDUCATION/TRAINING PROGRAM

## 2021-03-01 PROCEDURE — 99000 SPECIMEN HANDLING OFFICE-LAB: CPT | Performed by: STUDENT IN AN ORGANIZED HEALTH CARE EDUCATION/TRAINING PROGRAM

## 2021-03-01 PROCEDURE — 85018 HEMOGLOBIN: CPT | Performed by: FAMILY MEDICINE

## 2021-03-01 PROCEDURE — 99214 OFFICE O/P EST MOD 30 MIN: CPT | Mod: 25 | Performed by: FAMILY MEDICINE

## 2021-03-01 PROCEDURE — 80076 HEPATIC FUNCTION PANEL: CPT | Performed by: STUDENT IN AN ORGANIZED HEALTH CARE EDUCATION/TRAINING PROGRAM

## 2021-03-01 PROCEDURE — 80061 LIPID PANEL: CPT | Performed by: STUDENT IN AN ORGANIZED HEALTH CARE EDUCATION/TRAINING PROGRAM

## 2021-03-01 PROCEDURE — 96372 THER/PROPH/DIAG INJ SC/IM: CPT | Performed by: FAMILY MEDICINE

## 2021-03-01 PROCEDURE — 36415 COLL VENOUS BLD VENIPUNCTURE: CPT | Performed by: STUDENT IN AN ORGANIZED HEALTH CARE EDUCATION/TRAINING PROGRAM

## 2021-03-01 PROCEDURE — 82947 ASSAY GLUCOSE BLOOD QUANT: CPT | Performed by: STUDENT IN AN ORGANIZED HEALTH CARE EDUCATION/TRAINING PROGRAM

## 2021-03-01 RX ORDER — MEDROXYPROGESTERONE ACETATE 150 MG/ML
150 INJECTION, SUSPENSION INTRAMUSCULAR
Status: DISCONTINUED | OUTPATIENT
Start: 2021-03-01 | End: 2021-05-05

## 2021-03-01 RX ADMIN — MEDROXYPROGESTERONE ACETATE 150 MG: 150 INJECTION, SUSPENSION INTRAMUSCULAR at 14:40

## 2021-03-01 ASSESSMENT — MIFFLIN-ST. JEOR: SCORE: 1782.18

## 2021-03-01 ASSESSMENT — ANXIETY QUESTIONNAIRES
GAD7 TOTAL SCORE: 0
1. FEELING NERVOUS, ANXIOUS, OR ON EDGE: NOT AT ALL
5. BEING SO RESTLESS THAT IT IS HARD TO SIT STILL: NOT AT ALL
6. BECOMING EASILY ANNOYED OR IRRITABLE: NOT AT ALL
7. FEELING AFRAID AS IF SOMETHING AWFUL MIGHT HAPPEN: NOT AT ALL
2. NOT BEING ABLE TO STOP OR CONTROL WORRYING: NOT AT ALL
3. WORRYING TOO MUCH ABOUT DIFFERENT THINGS: NOT AT ALL
IF YOU CHECKED OFF ANY PROBLEMS ON THIS QUESTIONNAIRE, HOW DIFFICULT HAVE THESE PROBLEMS MADE IT FOR YOU TO DO YOUR WORK, TAKE CARE OF THINGS AT HOME, OR GET ALONG WITH OTHER PEOPLE: NOT DIFFICULT AT ALL

## 2021-03-01 ASSESSMENT — PATIENT HEALTH QUESTIONNAIRE - PHQ9
SUM OF ALL RESPONSES TO PHQ QUESTIONS 1-9: 0
5. POOR APPETITE OR OVEREATING: NOT AT ALL

## 2021-03-01 NOTE — TELEPHONE ENCOUNTER
Reason for Call:  Other    Detailed comments: Is needing referral for Sexual health due to insurance    Phone Number Patient can be reached at: Other phone number:  [phone]    Best Time: Other    Can we leave a detailed message on this number? Not Applicable    Call taken on 3/1/2021 at 11:41 AM by Marcella Beltrán

## 2021-03-01 NOTE — TELEPHONE ENCOUNTER
Routed to PCP.    Tran BSN-RN  Triage Nurse  [facility]: [facility]

## 2021-03-01 NOTE — TELEPHONE ENCOUNTER
"Viktor with [facility] says they faxed over request for home care orders to manage infusions, faxes were sent 3 times, on 2/19 and 2/26 and today.    Had video visit with PCP 2/16/21.    She says the fax request was for initial home care orders, face to face verification, and information on patient.   Says they faxed request to [phone].    I cannot find a related encounter, lots of communications in chart.    While I was on line with home care, ERNIE Murray overheard conversation.   She says ERNIE Joy has been handling this and home care just called [facility] as well just now.    Per Terri, it is not \"infusions\", it is testosterone injections being ordered.    Routed to [facility] sec'y pool to follow up.    [facility] fax is [phone] per Viktor.    Edith Batista RN  [facility]                "

## 2021-03-01 NOTE — PATIENT INSTRUCTIONS
Start Depo, removed Ortho Evra patch today  Schedule follow up with Dr. Juan  Schedule RN visit for shot teaching once you have testosterone in hand   Touch base with mental health team  Rogaine on face if desire extra hair growth      Effects and Expected Time Course of Masculinizing  Hormones    Effect Expected Onset Expected Maximum Effect   Skin oiliness/Acne 1-6 months 1-2 years   Facial/body hair growth 3-6 months 3-5 years    Scalp hair loss >12 months+ Variable   Increased muscle mass/strength 6-12 months 2-5 years   Body fat redistribution 3-6 months 2-5 years   Cessation of menses 2-6 months n/a   Clitoral enlargement 3-6 months 1-2 years   Vaginal atrophy 3-6 months 1-2 years   Deepened voice 3-12 months 1-2 years

## 2021-03-01 NOTE — TELEPHONE ENCOUNTER
Routed to PCP to please advise.      Tran BSN-RN  Triage Nurse  [facility]: [facility]

## 2021-03-01 NOTE — NURSING NOTE
Clinic Administered Medication Documentation      Depo Provera Documentation    URINE HCG: not indicated    Depo-Provera Standing Order inclusion/exclusion criteria reviewed.   Patient meets: inclusion criteria     BP: 150/97  LAST PAP/EXAM:   Lab Results   Component Value Date    PAP NIL 12/07/2018       Prior to injection, verified patient identity using patient's name and date of birth. Medication was administered. Please see MAR and medication order for additional information.     Was entire vial of medication used? Yes  Vial/Syringe: Single dose vial  Expiration Date:  09/2022    Patient instructed to remain in clinic for 15 minutes and report any adverse reaction to staff immediately .  NEXT INJECTION DUE: 5/17/21 - 5/31/21      Name of provider who requested the medication administration: Lydia Saba  Name of provider on site (faculty or community preceptor) at the time of performing the medication administration: Link Lydia or Mario    Date of next administration: n/a  Date of next office visit with provider to renew medication plan (must be seen annually): n/a

## 2021-03-02 ENCOUNTER — TELEPHONE (OUTPATIENT)
Dept: ENDOCRINOLOGY | Facility: CLINIC | Age: 31
End: 2021-03-02

## 2021-03-02 ASSESSMENT — ANXIETY QUESTIONNAIRES: GAD7 TOTAL SCORE: 0

## 2021-03-02 NOTE — TELEPHONE ENCOUNTER
"Left follow-up message to see if pt wants to try a 3 pack for hc. Referred by Dr Hays- sent mychart msg to pt last week, marked \"read.\"  "

## 2021-03-03 ENCOUNTER — OFFICE VISIT - HEALTHEAST (OUTPATIENT)
Dept: BEHAVIORAL HEALTH | Facility: CLINIC | Age: 31
End: 2021-03-03

## 2021-03-03 DIAGNOSIS — F25.9 SCHIZOAFFECTIVE DISORDER, CHRONIC CONDITION (H): ICD-10-CM

## 2021-03-03 LAB — TESTOST SERPL-MCNC: 3 NG/DL (ref 8–60)

## 2021-03-03 ASSESSMENT — PATIENT HEALTH QUESTIONNAIRE - PHQ9: SUM OF ALL RESPONSES TO PHQ QUESTIONS 1-9: 2

## 2021-03-03 ASSESSMENT — ANXIETY QUESTIONNAIRES
3. WORRYING TOO MUCH ABOUT DIFFERENT THINGS: NOT AT ALL
2. NOT BEING ABLE TO STOP OR CONTROL WORRYING: SEVERAL DAYS
1. FEELING NERVOUS, ANXIOUS, OR ON EDGE: NEARLY EVERY DAY
GAD7 TOTAL SCORE: 11
5. BEING SO RESTLESS THAT IT IS HARD TO SIT STILL: NEARLY EVERY DAY
7. FEELING AFRAID AS IF SOMETHING AWFUL MIGHT HAPPEN: NOT AT ALL
IF YOU CHECKED OFF ANY PROBLEMS ON THIS QUESTIONNAIRE, HOW DIFFICULT HAVE THESE PROBLEMS MADE IT FOR YOU TO DO YOUR WORK, TAKE CARE OF THINGS AT HOME, OR GET ALONG WITH OTHER PEOPLE: VERY DIFFICULT
4. TROUBLE RELAXING: NEARLY EVERY DAY
6. BECOMING EASILY ANNOYED OR IRRITABLE: SEVERAL DAYS

## 2021-03-09 ENCOUNTER — VIRTUAL VISIT (OUTPATIENT)
Dept: PSYCHOLOGY | Facility: CLINIC | Age: 31
End: 2021-03-09
Payer: MEDICAID

## 2021-03-09 DIAGNOSIS — F43.10 POSTTRAUMATIC STRESS DISORDER: ICD-10-CM

## 2021-03-09 DIAGNOSIS — F25.0 SCHIZOAFFECTIVE DISORDER, BIPOLAR TYPE (H): ICD-10-CM

## 2021-03-09 DIAGNOSIS — F64.0 GENDER DYSPHORIA IN ADOLESCENT AND ADULT: Primary | ICD-10-CM

## 2021-03-09 DIAGNOSIS — F41.1 GENERALIZED ANXIETY DISORDER: ICD-10-CM

## 2021-03-09 DIAGNOSIS — F90.9 ATTENTION DEFICIT HYPERACTIVITY DISORDER (ADHD), UNSPECIFIED ADHD TYPE: ICD-10-CM

## 2021-03-09 PROCEDURE — 99207 PR NO BILLABLE SERVICE THIS VISIT: CPT | Performed by: STUDENT IN AN ORGANIZED HEALTH CARE EDUCATION/TRAINING PROGRAM

## 2021-03-09 PROCEDURE — 90834 PSYTX W PT 45 MINUTES: CPT | Mod: HN | Performed by: STUDENT IN AN ORGANIZED HEALTH CARE EDUCATION/TRAINING PROGRAM

## 2021-03-09 NOTE — PROGRESS NOTES
[facility] -  Case Progress Note    Date of Service: 3/09/21   Legal Name: Ayana Prasad  Chosen name: Prakash Prasad (he/him)   : 1990  Medical Record Number: [id number]  Treating Provider: Dora Mazariegos, PhD - Postdoctoral Fellow   Type of Session: Individual  Present in Session: Randall   Number of Minutes:  40    Video start time: 14:15  Video end time: 14:55    Telemedicine Visit: The patient's condition can be safely assessed and treated via synchronous audio and visual telemedicine encounter.      Reason for Telemedicine Visit: Services only offered telehealth    Originating Site (Patient Location): Patient's home    Distant Site (Provider Location): Provider Remote Setting    Consent:  The patient/guardian has verbally consented to: the potential risks and benefits of telemedicine (video visit) versus in person care; bill my insurance or make self-payment for services provided; and responsibility for payment of non-covered services.     Mode of Communication:  Video Conference via [email or web address]    As the provider I attest to compliance with applicable laws and regulations related to telemedicine.    Current Symptoms/Status:  Gender Dysphoria: discomfort with birth-assigned sex (female), identifies and expresses gender as male. Dysphoria includes social and anatomical dysphoria. Prakash is pursuing gender-affirming medical interventions and has socially transitioned in most areas of his life.      Significant mental health history, with active diagnoses of Schizoaffective Disorder - Bipolar Type, PTSD, Generalized Anxiety Disorder, ADHD. Mental health symptoms are managed with medication and monitored through routine psychiatry visits and civil commitment. Prakash has therapy and [facility] support. Symptoms are reported to be stable at present.      Significant substance use history, including Opioid use Disorder, Stimulant Use Disorder, Cannabis Use Disorder. Specific substance use diagnoses  unclear and require further assessment and collateral information.      Progress Toward Treatment Goals:   Developed treatment plan  Established care at [facility] and hoping to start HRT later in the month    Intervention: Modality and Description:  Primary intervention was supportive individual therapy and psychoeducation. Diagnostic assessment was reviewed and Prakash expressed understanding of diagnoses given. Treatment plan was developed (see below). At this time, Prakash unsure of gender therapy needs beyond letter of support. Agreed to focus on LOS and address other needs if they arise. Prakash had first appointment at [facility] and hopeful to start testosterone next visit.   Reviewed [facility] Standards of Care and LOS criteria. History of gender dysphoria has been established. As for mental health stability, Prakash feels that mental health is reasonably well-controlled as demonstrated by medication adherence for the past two years, no hospitalizations since Oct 2019, his voluntary continuation of civil commitment for the purpose of MH support, and good use of mental health supports. MH supports currently in place include: bi-weekly therapy, bi-weekly psychiatry, 2x/week [facility].  Prakash will be adding the following supports shortly: in-home nursing 2x/week for medication set-up and injections, and Independent Living Skills worker.   Prakash researched top surgery using website topsurgery.[employer] provided at intake. He shared that his primary goal for top surgery is to have minimal scarring. Chest masculinization isn't as important, and he wants to achieve this through an exercise program. He has friends who have been through top surgery whom he plans to talk to to help develop aftercare planning.   Prakash was engaged in discussion and responsive to intervention. Responses tend to be concrete but Prakash demonstrates understanding of information discussed.      Assignment:  Continue attending to MH stability and use all  resources    Interactive Complexity:  There are four specific communication difficulties that complicate the work of the primary psychiatric procedure.  Interactive complexity (+[id number]) may be reported when at least one of these difficulties is present.    Communication difficulties present during current the psychiatric procedure include:  1. None.    DSM-5 Diagnoses:    F64.0/302.85  - Gender Dysphoria in Adult      F25.0/295.70  - Schizoaffective Disorder, Bipolar Type - per history  F43.10/309.81- Posttraumatic Stress Disorder - per history  F41.1/300.02  - Generalized Anxiety Disorder - per history  F90.9/314.01  - Unspecified Attention Deficit/Hyperactivity Disorder - per history     History of opioid, methamphetamine, and cannabis use disorders.     Plan/Need for Future Services:  Return for therapy in two weeks to continue letter of support         Dora Mazariegos, PhD      TREATMENT PLAN  Date of Treatment Plan: 3/9/21  Name: Ayana Prasad  : 1990  Medical Record Number: [id number]  Treating Provider: Dora Mazariegos, PhD - Postdoctoral Fellow  Type of Session: Individual  Present in Session: Prakash    Current Status:    Depression/Mood:  History of manic episodes with no current mood issues reported.    Anxiety/Panic:  Worry  Physiological reactivity (PTSD)    Thought:   Auditory Hallucinations    Sensorium:  Reports no problems or symptoms at this time    Behavior/Health:  Reports no problems or symptoms at this time    Chemical Use:  Denies both Alcohol and Drug Abuse - hx of substance dependence    Sexual Problems:  Reports no problems or symptoms at this time    Gender Problems:  Body and social dysphoria     Suicide Risk Assessment:  Assessed Level of Immediate Risk:  YES  Ideation:  NO  Plan:  NO  Means:  NO  Intent:  NO    Homicide Risk Assessment:  Assessed Level of Immediate Risk:  YES  Ideation:  NO  Plan:  NO  Means:  NO  Intent:  NO    Impact of Symptoms on Function:  Decreased  Physical/Health Status  Decreased Social/Family Function  Decreased Work Function  Decreased School Function    DSM-5 Diagnoses:     F64.0/302.85  - Gender Dysphoria in Adult      F25.0/295.70  - Schizoaffective Disorder, Bipolar Type - per history  F43.10/309.81- Posttraumatic Stress Disorder - per history  F41.1/300.02  - Generalized Anxiety Disorder - per history  F90.9/314.01  - Unspecified Attention Deficit/Hyperactivity Disorder - per history     History of opioid, methamphetamine, and cannabis use disorders.     Screening Questionnaires:  Please indicate whether the following screening questionnaires have been completed:  CAGE: Yes  PHQ-9: Yes    AVA-7: Yes  Safety Screening: Yes  WHODAS: No    Problem(s):  1. Gender Dysphoria  2. Mental health issues of Schizoaffective Disorder, PTSD, Anxiety, ADHD  3. History of substance abuse    Short-Long Term Goals  Decrease Symptoms  Increase Coping  Monitor Psych Status    Interventions  [facility]  Cognitive-Behavioral Therapy    Expected Outcomes and Prognosis  Expected improvement    Anticipated Treatment Duration:  Unknown    Frequency of Sessions  2 x / Month    Progress Update (for Plan Update Only)  NA:  1st Treatment Planning    Current Psychoactive Medications:    - Prolixin 25mg injection every two weeks for psychotic symptoms  - Lithium 1200mg  - Adderall 10mg  - Vyvanse 50mg  - Ambien 5mg at bedtime  - Klonopin as needed for anxiety  - Zyprexa as needed.   Discharge & Aftercare Goals: To Be Determined.  Care Coordination: Yes; psychiatrist SOPHIA Elena, [facility]; therapist JULISA Vanegas through [facility]; [facility] worker Nahum Bowman through [facility].     Consent to Treatment:   Patient participated in this treatment planning process and indicated verbal agreement with the above treatment plan.  If patient doesn't sign, indicate why: Telehealth visit due to COVID19 pandemic    Patient Signature:  unable to sign - verbal review and consent given through telemedicine  Date: 3/9/21    Provider Signature:     Date: 3/9/21    Supervisor Signature:Hilda Davis PsyD, CLAUDE    Date: 3/14/2021

## 2021-03-11 ENCOUNTER — VIRTUAL VISIT (OUTPATIENT)
Dept: NEUROSURGERY | Facility: CLINIC | Age: 31
End: 2021-03-11
Payer: MEDICAID

## 2021-03-11 ENCOUNTER — TELEPHONE (OUTPATIENT)
Dept: FAMILY MEDICINE | Facility: CLINIC | Age: 31
End: 2021-03-11

## 2021-03-11 ENCOUNTER — HOSPITAL ENCOUNTER (EMERGENCY)
Facility: CLINIC | Age: 31
Discharge: HOME | End: 2021-03-11
Attending: EMERGENCY MEDICINE | Admitting: EMERGENCY MEDICINE
Payer: MEDICAID

## 2021-03-11 ENCOUNTER — MYC MEDICAL ADVICE (OUTPATIENT)
Dept: FAMILY MEDICINE | Facility: CLINIC | Age: 31
End: 2021-03-11

## 2021-03-11 VITALS
OXYGEN SATURATION: 97 % | SYSTOLIC BLOOD PRESSURE: 130 MMHG | TEMPERATURE: 98.5 F | HEART RATE: 86 BPM | RESPIRATION RATE: 16 BRPM | DIASTOLIC BLOOD PRESSURE: 86 MMHG

## 2021-03-11 DIAGNOSIS — M54.17 LUMBOSACRAL RADICULOPATHY AT L5: Primary | ICD-10-CM

## 2021-03-11 DIAGNOSIS — F32.A DEPRESSION, UNSPECIFIED DEPRESSION TYPE: ICD-10-CM

## 2021-03-11 DIAGNOSIS — F41.9 ANXIETY: Primary | ICD-10-CM

## 2021-03-11 DIAGNOSIS — M54.9 INTRACTABLE BACK PAIN: Primary | ICD-10-CM

## 2021-03-11 PROCEDURE — 99207 PR NO CHARGE LOS: CPT | Performed by: NURSE PRACTITIONER

## 2021-03-11 PROCEDURE — 99282 EMERGENCY DEPT VISIT SF MDM: CPT

## 2021-03-11 RX ORDER — BUPROPION HYDROCHLORIDE 150 MG/1
TABLET ORAL
COMMUNITY
Start: 2021-03-04 | End: 2021-03-18

## 2021-03-11 ASSESSMENT — ENCOUNTER SYMPTOMS
DYSPHORIC MOOD: 1
HALLUCINATIONS: 0
NERVOUS/ANXIOUS: 1

## 2021-03-11 NOTE — PROGRESS NOTES
Patient's appointment was scheduled too early.   She has not yet had a chance to have her selective nerve root block.     Will check back after she undergoes spine injection.     Georgina Smith DNP  Neurosurgery Nurse Practitioner  [facility]  [phone]

## 2021-03-11 NOTE — LETTER
3/11/2021       RE: Ayana Prasad  [address] [address]     Dear Colleague,    Thank you for referring your patient, Ayana Prasad, to the [facility]UROS[facility] [place] at [facility]. Please see a copy of my visit note below.    Patient's appointment was scheduled too early.   She has not yet had a chance to have her selective nerve root block.     Will check back after she undergoes spine injection.     Georgina Smith DNP  Neurosurgery Nurse Practitioner  [facility]  [phone]

## 2021-03-11 NOTE — TELEPHONE ENCOUNTER
Pura from Dr Pagan's office calling for a referral to Dr Pagan for the patient. She stated the patient is on a restricted program and the referral is needed for Dr Pagan at the adult pain management clinic. Patient is scheduled to have an injection with him 3/18/21.     If there are any questions Pura can be reached at [phone] and that the referral needs to be faxed to [place].     Routed to  to review if anything else is needed.     Thank you,   Kristin Machado, RN

## 2021-03-12 NOTE — ED NOTES
Bed: ED15  Expected date:   Expected time:   Means of arrival:   Comments:  732 30f anxiety depression eta 5

## 2021-03-12 NOTE — ED PROVIDER NOTES
History   Chief Complaint:  more depressed     HPI   Ayana Prasad is a 30 year old adult with history of anxiety, depression, bipolar disorder, and PTSD who presents with anxiety and more depressed feeling.  The patient states that he woke up feeling more anxious than usual as well as more depressed.  He notes nothing happened to cause the onset of his symptoms.  He does note that he was recently taken off Seroquel and put on Wellbutrin about a week ago.  Today has been the only day that has been worse than normal.  He denies any hallucinations, suicidal ideation, missed medications, alcohol and drug use.  He denies any other physical symptoms.    Of note, he does not want a DEC assessment.  He is hoping to get a change in anxiety medication and then leave.  He does not feel as though the Clonazepam is working.    Review of Systems   Psychiatric/Behavioral: Positive for dysphoric mood. Negative for hallucinations and suicidal ideas. The patient is nervous/anxious.    All other systems reviewed and are negative.    Allergies  Haldol  Percocet   Prednisone  Risperidone  Tramadol  Droperidol  Seroquel     Medications  Wellbutrin  Adderall  Klonopin  Prolixin  Neurontin  Vyvanse  lithobid  mobic  zyprexa  prilosec  zofran  ambien     Past Medical History  ADHD  Bipolar 1 disorder  Borderline personality disorder  Cauda equina syndrome  Chronic low back pain  Depression  GERD  TBI  Marginal corneal ulcer, left  Nephrolithiasis  Polysubstance abuse, methamphetamine, hallucinogen, heroin, marijuana  PTSD     Past Surgical History  Back surgery for cauda equina  ENT surgery  Laparoscopic cholecystectomy  Laser holmium lithotripsy  Transurethral stone resection     Family History  Crohn's disease  Hypertension  Esophageal cancer  Diabetes  Hyperlipidemia  Anxiety    Social History:  Patient presents via EMS.    Physical Exam     Patient Vitals for the past 24 hrs:   BP Temp Temp src Pulse Resp SpO2   03/11/21 2051 -- --  -- -- -- 97 %   03/11/21 2050 -- -- -- -- -- 97 %   03/11/21 2048 130/86 98.5  F (36.9  C) Oral 86 16 --     Physical Exam  General: Alert, appears well-developed and well-nourished. Cooperative.     In mild distress  HEENT:  Head:  Atraumatic  Ears:  External ears are normal  Mouth/Throat:  Oropharynx is without erythema or exudate and mucous membranes are moist.   Eyes:   Conjunctivae normal and EOM are normal. No scleral icterus.  CV:  Normal rate, regular rhythm, normal heart sounds and radial pulses are 2+ and symmetric.  No murmur.  Resp:  Breath sounds are clear bilaterally    Non-labored, no retractions or accessory muscle use  GI:  Abdomen is soft, no distension, no tenderness. No rebound or guarding.  No CVA tenderness bilaterally  MS:  Normal range of motion. No edema.    Normal strength in all 4 extremities.     Back atraumatic.    No midline cervical, thoracic, or lumbar tenderness  Skin:  Warm and dry.  No rash or lesions noted.  Neuro: Alert. Normal strength.  GCS: 15  Psych:  Depressed mood and flat affect. Anxious. Denies SI/HI.  Denies hallucinations.     Emergency Department Course     Emergency Department Course:    Reviewed:  I reviewed nursing notes, vitals, past medical history and care everywhere    Assessments:  2108 I obtained history and examined the patient as noted above.   2115 I rechecked the patient and explained findings.  Discussed return precautions.  Discharged     Disposition:  The patient was discharged to home.       Impression & Plan   Medical Decision Making:  Ayana Prasad is a 30 year old adult who presents for evaluation of anxiety.  There is  history of anxiety in the past and they are on medications.  He is requesting new medications for his anxiety but already has prescribed clonazepam as well as multiple other psychiatric medications.  We discussed that providing additional benzodiazepines would affect the same receptors that clonazepam is already affecting.  He  understood our reasoning behind not prescribing additional anxiety medications at this time and we did offer a DEC assessment which he declined at this time.  I do not think there is a reason to put the patient on a hold as he is not actively suicidal, homicidal, and has sound decision-making per my examination.  A outpatient referral to psychiatry was placed prior to disposition.  He does not appear to be having any general medical problems causing his anxiety and no further laboratory or imaging work-up is necessary tonight.      There are no signs of serious decompensation warranting psychiatric hospitalization or 72 hold (no suicidal or homicidal ideation, no danger to self).  Supportive outpatient management is therefore indicated.      Discharged home after all questions answered and return precautions understood.    Diagnosis:    ICD-10-CM    1. Anxiety  F41.9 MENTAL HEALTH REFERRAL  - Adult; Psychiatry; Psychiatry; [facility] [phone]; We will contact you to schedule the appointment or please call with any questions   2. Depression, unspecified depression type  F32.9        Discharge Medications:  Discharge Medication List as of 3/11/2021  9:34 PM          Scribe Disclosure:  I, Shanice Kowalski, am serving as a scribe at 8:58 PM on 3/11/2021 to document services personally performed by Ata Thompson MD based on my observations and the provider's statements to me.     Ata Adams MD  03/11/21 1914

## 2021-03-12 NOTE — ED TRIAGE NOTES
"Woke up this AM depressed, did not take meds \"they do not help\".    Pt A&O x 3, CMS x 3, ABCD's adequate in triage    "

## 2021-03-15 DIAGNOSIS — Z11.59 ENCOUNTER FOR SCREENING FOR OTHER VIRAL DISEASES: ICD-10-CM

## 2021-03-15 LAB
LABORATORY COMMENT REPORT: NORMAL
SARS-COV-2 RNA RESP QL NAA+PROBE: NEGATIVE
SARS-COV-2 RNA RESP QL NAA+PROBE: NORMAL
SPECIMEN SOURCE: NORMAL
SPECIMEN SOURCE: NORMAL

## 2021-03-15 PROCEDURE — U0005 INFEC AGEN DETEC AMPLI PROBE: HCPCS | Performed by: ANESTHESIOLOGY

## 2021-03-15 PROCEDURE — U0003 INFECTIOUS AGENT DETECTION BY NUCLEIC ACID (DNA OR RNA); SEVERE ACUTE RESPIRATORY SYNDROME CORONAVIRUS 2 (SARS-COV-2) (CORONAVIRUS DISEASE [COVID-19]), AMPLIFIED PROBE TECHNIQUE, MAKING USE OF HIGH THROUGHPUT TECHNOLOGIES AS DESCRIBED BY CMS-2020-01-R: HCPCS | Performed by: ANESTHESIOLOGY

## 2021-03-16 ENCOUNTER — TELEPHONE (OUTPATIENT)
Dept: FAMILY MEDICINE | Facility: CLINIC | Age: 31
End: 2021-03-16

## 2021-03-16 ENCOUNTER — TELEPHONE (OUTPATIENT)
Dept: NEUROSURGERY | Facility: CLINIC | Age: 31
End: 2021-03-16

## 2021-03-16 NOTE — TELEPHONE ENCOUNTER
Reason for Call:  Other Please fax over    Detailed comments: Med list / H&P / Last visit note  [phone]     Phone Number Patient can be reached at: Other phone number:  [phone]*    Best Time: As soon as possible    Can we leave a detailed message on this number? Not Applicable    Call taken on 3/16/2021 at 11:27 AM by Winnie Streeter

## 2021-03-16 NOTE — TELEPHONE ENCOUNTER
Forms received from: [facility]    Phone number listed: [phone]   Fax listed: [phone]  Date received: 03/16/2021  Form description: initial order   Once forms are completed, please return to [facility]  via fax.  Form placed: in providers folder  Benita Shipman

## 2021-03-17 ENCOUNTER — COMMUNICATION - HEALTHEAST (OUTPATIENT)
Dept: BEHAVIORAL HEALTH | Facility: CLINIC | Age: 31
End: 2021-03-17

## 2021-03-18 ENCOUNTER — TELEPHONE (OUTPATIENT)
Dept: FAMILY MEDICINE | Facility: CLINIC | Age: 31
End: 2021-03-18

## 2021-03-18 ENCOUNTER — HOSPITAL ENCOUNTER (OUTPATIENT)
Facility: AMBULATORY SURGERY CENTER | Age: 31
Discharge: HOME | End: 2021-03-18
Attending: ANESTHESIOLOGY | Admitting: ANESTHESIOLOGY
Payer: MEDICAID

## 2021-03-18 ENCOUNTER — ANCILLARY PROCEDURE (OUTPATIENT)
Dept: RADIOLOGY | Facility: AMBULATORY SURGERY CENTER | Age: 31
End: 2021-03-18
Attending: ANESTHESIOLOGY
Payer: MEDICAID

## 2021-03-18 VITALS
HEART RATE: 91 BPM | RESPIRATION RATE: 16 BRPM | DIASTOLIC BLOOD PRESSURE: 100 MMHG | BODY MASS INDEX: 36.8 KG/M2 | HEIGHT: 66 IN | SYSTOLIC BLOOD PRESSURE: 148 MMHG | WEIGHT: 229 LBS | OXYGEN SATURATION: 98 % | TEMPERATURE: 98 F

## 2021-03-18 DIAGNOSIS — M54.17 LUMBOSACRAL RADICULOPATHY AT L5: ICD-10-CM

## 2021-03-18 DIAGNOSIS — R52 PAIN: ICD-10-CM

## 2021-03-18 PROCEDURE — 64483 NJX AA&/STRD TFRM EPI L/S 1: CPT | Mod: LT

## 2021-03-18 RX ORDER — IOPAMIDOL 408 MG/ML
INJECTION, SOLUTION INTRATHECAL PRN
Status: DISCONTINUED | OUTPATIENT
Start: 2021-03-18 | End: 2021-03-18 | Stop reason: HOSPADM

## 2021-03-18 RX ORDER — BUPIVACAINE HYDROCHLORIDE 2.5 MG/ML
INJECTION, SOLUTION INFILTRATION; PERINEURAL PRN
Status: DISCONTINUED | OUTPATIENT
Start: 2021-03-18 | End: 2021-03-18 | Stop reason: HOSPADM

## 2021-03-18 RX ORDER — LIDOCAINE HYDROCHLORIDE 10 MG/ML
INJECTION, SOLUTION EPIDURAL; INFILTRATION; INTRACAUDAL; PERINEURAL PRN
Status: DISCONTINUED | OUTPATIENT
Start: 2021-03-18 | End: 2021-03-18 | Stop reason: HOSPADM

## 2021-03-18 ASSESSMENT — MIFFLIN-ST. JEOR: SCORE: 1779.46

## 2021-03-18 NOTE — OP NOTE
Patient: Ayana Prasad Age: 30 year old   MRN: [id number] Attending: Dr. Pagan     Date of Visit: March 18, 2021      PAIN MEDICINE CLINIC PROCEDURE NOTE    ATTENDING CLINICIAN:    Eliza Pagan MD    ASSISTANT CLINICIAN:  J Luis Saleem MD    Referring provider:Georgina Smith APRN CNP of neurosurgery.    PREPROCEDURE DIAGNOSES:  1.  L5 left lumbar radiculopathy  2.  Lumbar spondylosis  3.  Posterior disc extrusion at the L5-S1      PROCEDURE(S) PERFORMED:  1.  Left L5-S1 transforaminal injection to target left L5 nerve root.  2.  Fluoroscopic guidance for the above-named procedure(s).      ANESTHESIA:  Local. See nursing notes for pre and post procedure vitals    BLOOD LOSS:  Minimal.    DRAINS AND SPECIMENS:  None.    COMPLICATIONS:  None.    INDICATIONS:  Ayana Prasad is a 30 year old adult with a history of low back pain with radicular symptoms to the lower extremity.  Her MRI of the lumbosacral spine reviewed.  Patient has L5-S1 lumbar laminectomy.  She has pronounced spondylosis and prominent disc bulge at the L5-S1.  There is superimposed posterior disc extrusion above and below the disc margin.  There is moderate to severe bilateral neuroforaminal stenosis.  Disc osteophyte complex contact bilateral exiting L5 nerve roots.  Left greater than the right.  The patient stated that the patient was in their usual state of health and denied recent anticoagulant use or recent infections.  Therefore, the plan is to perform above mentioned procedure.     Procedure Details:  The patient was met in the procedure room, where the patient was identified by name, medical record number and date of birth.  All of the patient s last minute questions were answered. Written informed consent was obtained and saved in the electronic medical record, after the risks, benefits, and alternatives were discussed with the patient.      A formal time-out procedure was performed, as per protocol, including patient name, title of procedure,  and site of procedure, and all in the room concurred.  Routine monitors were applied.      The patient was placed in the prone position on the procedure room table.  All pressure points were checked and comfortably padded.  Routine monitors were placed.  Vital signs were stable.    A chlorhexidine prep was completed followed by sterile draping per standard procedure.     The AP fluoroscopic view was optimized for approach at left L5-S1  neural foramina. Targeting the 6 o'clock position of the pedicle, skin, and subcutaneous tissue overlying the region was anesthetized with 1% lidocaine using a 25-gauge 1-1/2 inch needle.  Then a 22-gauge 5 oh inch spinal needle was advanced through the anesthetized tract. Under serial fluoroscopic images, the needle are intermittently advanced until osseous contact was made at the 6 o'clock position of the pedicle.  The needle was then walked off in an inferior direction.  Using lateral fluoroscopic imaging, needle tip position was confirmed at the superior portion of the neural foramen.  Then after negative aspiration, 0.5 mL of the Omnipaque contrast was injected, confirming appropriate epidural and nerve root spread.  After negative aspiration, 2 mL of quarter percent bupivacaine was administered and the needle removed.  Light pressure was held at the puncture site(s) to prevent ecchymosis and oozing.  The patient's skin was cleansed, and hemostasis was confirmed.  Band-aids were applied to the needle injection site(s).      Condition:    The patient remained awake and alert throughout the procedure.  The patient tolerated the procedure well and was monitored for approximately 15 minutes afterward in the post procedure area.  There were no immediate post procedure complications noted.  The patient was then discharged to home as per protocol.  The patient will follow up in the outpatient clinic in 4 week(s), unless otherwise clinically indicated.

## 2021-03-18 NOTE — TELEPHONE ENCOUNTER
Kenroy Clinical Coordinator from [facility] calling to request patient's last visit note, health and physical, and medication list along with an order for Med management and injection to be faxed to [phone].    Routed to pcp for orders before faxing.    Kristin Machado RN

## 2021-03-18 NOTE — DISCHARGE INSTRUCTIONS
PAIN INJECTION HOME CARE INSTRUCTIONS  Activity  -You may resume most normal activity levels with the exception of strenuous activity. It may help to move in ways that hurt before the injection, to see if the pain is still there, but do not overdo it. Take it easy for the rest of the day.    -DO NOT remove bandaid for 24 hours  -DO NOT shower for 24 hours      Pain  -You may feel immediate pain relief and numbness for a period of time after the injection. This may indicate the medication has reached the right spot.  -Your pain may return after this short pain-free period, or may even be a little worse for a day or two. It may be caused by needle irritation or by the medication itself. The medications usually take two or three days to start working, but can take as long as a week.    -You may use an ice pack for 20 minutes every 2 hours for the first 24 hours  -You may use a heating pad after the first 24 hours  -You may use Tylenol (acetaminophen) every 4 hours or other pain medicines as directed by your physician          DID YOU RECEIVE STEROIDS TODAY?  Yes    Common side effects of steroids:  Not everyone will experience corticosteroid side effects. If side effects are experienced, they will gradually subside in the 7-10 day period following an injection. Most common side effects include:  -Flushed face and/or chest  -Feeling of warmth, particularly in the face but could be an overall feeling of warmth  -Increased blood sugar in diabetic patients  -Menstrual irregularities my occur. If taking hormone-based birth control an alternate method of birth control is recommended  -Sleep disturbances and/or mood swings are possible  -Leg cramps    PLEASE KEEP TRACK OF YOUR SYMPTOMS AND NOTE ANY CHANGES FOR YOUR DOCTOR.       Please contact us if you have:  -Severe pain  -Fever more than 101.5 degrees Fahrenheit  -Signs of infection at the injection site (redness, swelling, or drainage)    If you have questions, please  contact the Pain Clinic at [phone] Option #1 between the hours of 7:00 am and 3:00 pm Monday through Friday. After office hours you can contact the on call provider by dialing [phone]. If you need immediate attention, we recommend that you go to a hospital emergency room or dial [phone].    For patients seen by the PM and R service, please call [phone].    If you have procedure scheduling questions please call [phone] Option #2

## 2021-03-18 NOTE — H&P
"Abbreviated History and Physical    Patient Name: Ayana Prasad  YOB: 1990    Reason for Procedure: L5 lumbar radicular    History:    Past Medical History:   Diagnosis Date     ADHD (attention deficit hyperactivity disorder)      Bipolar 1 disorder      Borderline personality disorder      Cauda equina syndrome      Chronic low back pain      Depression      Depressive disorder      GERD (gastroesophageal reflux disease)      h/o TBI (traumatic brain injury)      Marginal corneal ulcer, left 7/17/2015     Nephrolithiasis      Polysubstance abuse - methamphetamine, hallucinagen, heroin, marijuana     currently in remission     PONV (postoperative nausea and vomiting)      PTSD (post-traumatic stress disorder)        History of obstructive sleep apnea?  No    History of problems with sedation?  No    Physical exam:  BP (!) 148/100   Pulse 91   Temp 98  F (36.7  C) (Temporal)   Resp 16   Ht 1.683 m (5' 6.25\")   Wt 103.9 kg (229 lb)   LMP 03/18/2021   SpO2 98%   BMI 36.68 kg/m    General: in no apparent distress   Neuro: At least antigravity strength noted in all 4 extremities  Respiratory: Clear to ausculation bilaterally   Cardiac: Regular rate and rhythm  Skin: No rashes or lesions on exposed areas of skin    Medications:   Current Outpatient Medications   Medication     amphetamine-dextroamphetamine (ADDERALL) 10 MG tablet     clonazePAM (KLONOPIN) 1 MG tablet     fluPHENAZine decanoate (PROLIXIN) 25 MG/ML injection     gabapentin (NEURONTIN) 300 MG capsule     ibuprofen (ADVIL/MOTRIN) 800 MG tablet     lisdexamfetamine (VYVANSE) 50 MG capsule     lithium ER (LITHOBID) 300 MG CR tablet     meloxicam (MOBIC) 15 MG tablet     OLANZapine (ZYPREXA) 2.5 MG tablet     omeprazole (PRILOSEC) 20 MG DR capsule     ondansetron (ZOFRAN ODT) 4 MG ODT tab     ALMACONE -400-40 MG/5ML SUSP suspension     nicotine (NICORETTE) 2 MG gum     zolpidem (AMBIEN) 5 MG tablet     Current Facility-Administered " Medications   Medication     medroxyPROGESTERone (DEPO-PROVERA) injection 150 mg       Allergies:     Allergies   Allergen Reactions     Haldol [Haloperidol] Other (See Comments)     Makes patient very angry and anxious     Adhesive Tape Hives     Percocet [Oxycodone-Acetaminophen] Nausea and Vomiting     Prednisone Other (See Comments) and Hives     Suicidal ideation     Risperidone Other (See Comments)     Tramadol Hcl Nausea and Vomiting     Droperidol Anxiety     Seroquel [Quetiapine] Palpitations     Spent 2 weeks in the hospital due to having seroquel, caused palpitations and QT prolongation       ASA classification: 2    OK for local anesthetic use.     Eliza Pagan MD  Pain Medicine  [facility] Department of Anesthesia  3/18/2021

## 2021-03-18 NOTE — PROGRESS NOTES
"  Assessment & Plan     Elevated glucose  Screening for condition  Baseline labs showed elevated glucose (not fasting) of 112. A1c obtained today for further evaluation given obesity, elevated triglycerides (see below).   - Hemoglobin A1c (Carlton's)    Elevated BMI (35)  Hypertriglyceridemia  Elevated BMI with hypertriglyceridemia noted on baseline labs. Discussed low carb diet and increased exercise with patient. He is contemplative.   - Recheck lipids when fasting (was not fasting today)  - Consider statin vs Fenofibrate, omega 3's if worsening    Gender dysphoria  Mental health and medical assessments complete. Meets criteria for gender dysphoria diagnosis. He expressed understanding and agreement of the risks associated with gender affirming hormone therapy with testosterone and wishes to proceed. Will start low and goal slow, initial dose 20 mg subQ weekly, likely goal 70-80 mg weekly. Monitor hallucinations/voices on T. Also reached out to patient's gender therapist.   - needle, disp, 18G X 1\" MISC; Use for drawing up weekly testosterone dose  - Needle, Disp, 25G X 5/8\" MISC; Use for injecting weekly testosterone dose  - syringe, disposable, 1 ML MISC; Use for weekly testosterone dose  - Schedule RN visit for shot teaching once you have testosterone in hand   - Rogaine on face if desire extra hair growth    Fertility: not desired   Partners: ciswomen (currently no interest in having partners with sperm)  Contraception: Depo Provera - started 3/01/2021  Counselled patient about controlled substances: Yes.        BMI:   Estimated body mass index is 35.63 kg/m  as calculated from the following:    Height as of 3/18/21: 1.683 m (5' 6.25\").    Weight as of this encounter: 100.9 kg (222 lb 6.4 oz).   Weight management plan: Discussed healthy diet and exercise guidelines    No follow-ups on file.    Glenda Juan MD  [facility] LINETTE Randall is a 30 year old individual that " "uses pronouns He/Him/His/Himself that presents today for further discussion of:  masculinizing hormone therapy. Patient seen as new patient in [facility] [place] [place] on 3/01/2021.     HPI   Gender identity: male    Any special concerns today?   - Hoping to start testosterone  - Goals: Get rid of periods, breasts, genitals, body shape, would like to develop facial hair  - Fertility/child bearing plans: \"not that important to me\"     Gender affirmation is being sought in these other ways:  - Dress, hair, name  - Consultation scheduled with Dr. Mayers 9/2021  - Interested in top and bottom surgery     History of depression, anxiety  - Has [employer] worker since January 2021  - Gender therapist (Dora Mazariegos, PhD) 2x/month  - Psychiatrist Jim Corbett PMALEX, [facility] - VAL sent   - chronically hears voices, will need to monitor on T    Patient notably seen in ER 3/11/2021 for anxiety, declined DEC assessment, denied active SI or HI    Review of Systems    ROS: 10 point ROS neg other than the symptoms noted above in the HPI.       Objective    /82   Pulse 112   Temp 98.8  F (37.1  C) (Oral)   Resp 16   Wt 100.9 kg (222 lb 6.4 oz)   LMP 03/18/2021   SpO2 97%   BMI 35.63 kg/m    Body mass index is 35.63 kg/m .  Physical Exam  Constitutional:       General: He is not in acute distress.     Appearance: He is well-developed. He is not diaphoretic.   Cardiovascular:      Rate and Rhythm: Normal rate.   Pulmonary:      Effort: Pulmonary effort is normal. No respiratory distress.   Neurological:      General: No focal deficit present.      Mental Status: He is alert.   Psychiatric:         Attention and Perception: Attention normal.         Mood and Affect: Mood is anxious.         Speech: Speech normal.         Behavior: Behavior is withdrawn. Behavior is not agitated.         Thought Content: Thought content does not include homicidal or suicidal ideation.          Diagnostic Test Results  Component      Latest Ref " Rng & Units 3/1/2021             Bilirubin Direct      0.0 - 0.2 mg/dL <0.1   Bilirubin Total      0.2 - 1.3 mg/dL 0.2   Albumin      3.4 - 5.0 g/dL 3.1 (L)   Protein Total      6.8 - 8.8 g/dL 7.4   Alkaline Phosphatase      40 - 150 U/L 56   ALT      0 - 50 U/L 21   AST      0 - 45 U/L 12     Component      Latest Ref Rng & Units 3/1/2021   Cholesterol      <200 mg/dL 188   Triglycerides      <150 mg/dL 358 (H)   HDL Cholesterol      >49 mg/dL 66   LDL Cholesterol Calculated      <100 mg/dL 51   Non HDL Cholesterol      <130 mg/dL 122     Component      Latest Ref Rng & Units 12/15/2020 12/17/2020 1/10/2021 3/1/2021                Glucose      70 - 99 mg/dL 131 (H) 156 (H) 119 (H) 112 (H)     Component      Latest Ref Rng & Units 3/1/2021   Testosterone Total      8 - 60 ng/dL 3 (L)     Component      Latest Ref Rng & Units 12/14/2020 12/15/2020 1/10/2021 3/1/2021   Hemoglobin      11.7 - 15.7 g/dL 12.7 12.8 13.0 12.2

## 2021-03-19 ENCOUNTER — TELEPHONE (OUTPATIENT)
Dept: NEUROSURGERY | Facility: CLINIC | Age: 31
End: 2021-03-19

## 2021-03-19 ENCOUNTER — OFFICE VISIT (OUTPATIENT)
Dept: FAMILY MEDICINE | Facility: CLINIC | Age: 31
End: 2021-03-19
Payer: MEDICAID

## 2021-03-19 VITALS
HEART RATE: 112 BPM | SYSTOLIC BLOOD PRESSURE: 139 MMHG | RESPIRATION RATE: 16 BRPM | BODY MASS INDEX: 35.63 KG/M2 | OXYGEN SATURATION: 97 % | WEIGHT: 222.4 LBS | TEMPERATURE: 98.8 F | DIASTOLIC BLOOD PRESSURE: 82 MMHG

## 2021-03-19 DIAGNOSIS — F64.9 GENDER DYSPHORIA: ICD-10-CM

## 2021-03-19 DIAGNOSIS — Z13.9 SCREENING FOR CONDITION: ICD-10-CM

## 2021-03-19 DIAGNOSIS — R73.09 ELEVATED GLUCOSE: Primary | ICD-10-CM

## 2021-03-19 LAB — HBA1C MFR BLD: 5.8 % (ref 4.1–5.7)

## 2021-03-19 PROCEDURE — 83036 HEMOGLOBIN GLYCOSYLATED A1C: CPT | Performed by: STUDENT IN AN ORGANIZED HEALTH CARE EDUCATION/TRAINING PROGRAM

## 2021-03-19 PROCEDURE — 99214 OFFICE O/P EST MOD 30 MIN: CPT | Mod: GC | Performed by: STUDENT IN AN ORGANIZED HEALTH CARE EDUCATION/TRAINING PROGRAM

## 2021-03-19 PROCEDURE — 36416 COLLJ CAPILLARY BLOOD SPEC: CPT | Performed by: STUDENT IN AN ORGANIZED HEALTH CARE EDUCATION/TRAINING PROGRAM

## 2021-03-19 RX ORDER — TESTOSTERONE CYPIONATE 1000 MG/10ML
20 INJECTION, SOLUTION INTRAMUSCULAR WEEKLY
Qty: 2 ML | Refills: 0 | Status: SHIPPED | OUTPATIENT
Start: 2021-03-19 | End: 2021-03-22

## 2021-03-19 NOTE — TELEPHONE ENCOUNTER
I spoke to the patient regarding follow up appt with Sarah. Patient had nerve root block done 03/18/21. Follow up appt scheduled for 04/02/21 video appt.

## 2021-03-22 ENCOUNTER — TELEPHONE (OUTPATIENT)
Dept: FAMILY MEDICINE | Facility: CLINIC | Age: 31
End: 2021-03-22

## 2021-03-22 DIAGNOSIS — F64.9 GENDER DYSPHORIA: Primary | ICD-10-CM

## 2021-03-22 RX ORDER — TESTOSTERONE CYPIONATE 200 MG/ML
20 INJECTION, SOLUTION INTRAMUSCULAR WEEKLY
Qty: 5 ML | Refills: 0 | Status: SHIPPED | OUTPATIENT
Start: 2021-03-22 | End: 2021-03-22

## 2021-03-22 RX ORDER — TESTOSTERONE CYPIONATE 200 MG/ML
20 INJECTION, SOLUTION INTRAMUSCULAR WEEKLY
Qty: 1 ML | Refills: 0 | Status: SHIPPED | OUTPATIENT
Start: 2021-03-22 | End: 2021-03-24

## 2021-03-22 NOTE — TELEPHONE ENCOUNTER
[facility] phone call message- medication clarification/question:    Full Medication Name: testosterone cypionate (DEPOTESTOSTERONE) 100 MG/ML injection       Dose: Route: Inject 0.2 mLs (20 mg) Subcutaneous once a week - Subcutaneous    Question/Clarification needed: Pharmacists states they need a new prescription sent or a verbal RN call due to the quantity of the medication.      Pharmacy confirmed as   [facility] [place] [facility] - [place], [place] - [address]  [address]  [place] [address]  Phone: [phone] Fax: [phone]  : Yes    Please leave ONLY preferred pharmacy    OK to leave a message on voice mail? Yes    Advised patient that RN would call back within 3 hours, unless emergent.    Primary language: English      needed? No    Call taken on March 22, 2021 at 10:17 AM by Cassia Up    Route to [facility] HELADIO

## 2021-03-22 NOTE — TELEPHONE ENCOUNTER
Restricted form completed.  Will need to stick with seeing 1 provider Dr. Juan.  Prescription sent.  Future prescriptions to come from Dr. Juan.    Becki Avery NP-C

## 2021-03-22 NOTE — TELEPHONE ENCOUNTER
Pharmacist states that testosterone 100mg/ml only comes in 10 ml vials. She is requesting new order be sent for 200mg/ml vial with correct dose for 20 mg. Routing to provider to re-order if appropriate.   María Elena Albright RN

## 2021-03-22 NOTE — TELEPHONE ENCOUNTER
Medication needs to be sent over by a doctor that is covered under pts insurance -- Becki Avery is authorized to send this over.   The pt says she has tutorial for injection on Wednesday afternoon, would like this taken care of by Tuesday if possible     Please call pharmacy with questions   [phone]

## 2021-03-22 NOTE — TELEPHONE ENCOUNTER
Left message on [facility] voicemail with below info per Becki Avery.    Called patient and informed her that she needs to stick with one provider and that Becki filled out form so that Dr Juan would be the provider.  Patient verbalized understanding.

## 2021-03-23 ENCOUNTER — VIRTUAL VISIT (OUTPATIENT)
Dept: FAMILY MEDICINE | Facility: CLINIC | Age: 31
End: 2021-03-23
Payer: MEDICAID

## 2021-03-23 DIAGNOSIS — R20.0 BILATERAL HAND NUMBNESS: Primary | ICD-10-CM

## 2021-03-23 DIAGNOSIS — R56.9 SEIZURE-LIKE ACTIVITY (H): ICD-10-CM

## 2021-03-23 DIAGNOSIS — F19.951 SUBSTANCE-INDUCED PSYCHOTIC DISORDER WITH HALLUCINATIONS (H): ICD-10-CM

## 2021-03-23 DIAGNOSIS — R20.0 FACIAL NUMBNESS: ICD-10-CM

## 2021-03-23 DIAGNOSIS — Z53.9 DIAGNOSIS NOT YET DEFINED: Primary | ICD-10-CM

## 2021-03-23 PROCEDURE — 99442 PR PHYSICIAN TELEPHONE EVALUATION 11-20 MIN: CPT | Performed by: NURSE PRACTITIONER

## 2021-03-23 ASSESSMENT — ENCOUNTER SYMPTOMS
NERVOUS/ANXIOUS: 0
NUMBNESS: 1

## 2021-03-23 NOTE — PROGRESS NOTES
"Prakash is a 30 year old who is being evaluated via a billable telephone visit.      What phone number would you like to be contacted at? [phone]  How would you like to obtain your AVS? MyChart    Assessment & Plan     Bilateral hand numbness  - EMG; Future    Facial numbness  Previous MRIs and neurology notes reviewed.  No previous cause found for facial numbness.  May follow-up with neurology if desires.    Substance-induced psychotic disorder with hallucinations (H)  Not currently using    Seizure-like activity (H)  Previous work-up by neurology unremarkable.      Review of external notes as documented elsewhere in note  Review of the result(s) of each unique test - MRI  20 minutes spent on the date of the encounter doing chart review, history and exam, documentation and further activities as noted above       BMI:   Estimated body mass index is 35.63 kg/m  as calculated from the following:    Height as of 3/18/21: 1.683 m (5' 6.25\").    Weight as of 3/19/21: 100.9 kg (222 lb 6.4 oz).         No follow-ups on file.    BROOKLYN rCuz CNP  [facility] SHAILESH Randall is a 30 year old who presents for the following health issues     HPI     Concern - Facial and hand numbness  Onset: Facial started a while ago, hand numbness started yesterday.   Description: Face: chin to cheeks and works up forehead-bilateral. Hands-both.  Intensity: moderate  Progression of Symptoms:  worsening  Accompanying Signs & Symptoms: face and hands numbness comes and goes.  Previous history of similar problem: yes, but nothing was done for it.  Precipitating factors:        Worsened by: thinks its from the seroquel.  Alleviating factors:        Improved by: none  Therapies tried and outcome:  none       Phone call visit completed due to COVID-19 outbreak.       Continues to have facial numbness. Thinks is the same numbness that had been having prior. Chin, cheeks and forehead. Will be to both sides of " cheeks. Will come and go. The numbness will be there for hours. No tingling to lips or tongue. Yesterday started to have numbness to hands. Does not get better with repositioning. Does not resolve. No injury. No repeat emilia  motions or movements. No swelling to hands. Last night tingling was to arms a bit, but not a lot. Tingling is not there today. Thinks is related to Seroquel or new vitamins that has been taking. No vision changes. Will take klonopin for the facial numbness thinking it is related to anxiety but it does not help.  Is able to move arms and legs, walk talk, eat and drink without problems.        Review of Systems   Neurological: Positive for numbness (Face, bilateral hands).   Psychiatric/Behavioral: The patient is not nervous/anxious.           Objective           Vitals:  No vitals were obtained today due to virtual visit.    Physical Exam   healthy, alert and no distress  PSYCH: Alert and oriented times 3; coherent speech, normal   rate and volume, able to articulate logical thoughts, able   to abstract reason, no tangential thoughts, no hallucinations   or delusions  His affect is normal and pleasant  RESP: No cough, no audible wheezing, able to talk in full sentences  Remainder of exam unable to be completed due to telephone visits        Phone call duration: 12 minutes

## 2021-03-23 NOTE — PATIENT INSTRUCTIONS
You may schedule follow-up appointment with neurology-[facility] [phone]     I have placed the order for the EMG.

## 2021-03-24 ENCOUNTER — ALLIED HEALTH/NURSE VISIT (OUTPATIENT)
Dept: FAMILY MEDICINE | Facility: CLINIC | Age: 31
End: 2021-03-24
Payer: MEDICAID

## 2021-03-24 ENCOUNTER — TELEPHONE (OUTPATIENT)
Dept: FAMILY MEDICINE | Facility: CLINIC | Age: 31
End: 2021-03-24

## 2021-03-24 DIAGNOSIS — F64.9 GENDER IDENTITY DISORDER: Primary | ICD-10-CM

## 2021-03-24 PROCEDURE — 99211 OFF/OP EST MAY X REQ PHY/QHP: CPT

## 2021-03-24 RX ORDER — TESTOSTERONE CYPIONATE 200 MG/ML
20 INJECTION, SOLUTION INTRAMUSCULAR WEEKLY
Qty: 4 ML | Refills: 0 | Status: SHIPPED | OUTPATIENT
Start: 2021-03-24 | End: 2021-04-20

## 2021-03-24 NOTE — LETTER
April 8, 2021      To Whom It May Concern:    Prakash is prescribed testosterone and should be allowed to give himself weekly injections. Please do not hesitate to call me with questions or concerns.       Glenda Juan MD

## 2021-03-24 NOTE — TELEPHONE ENCOUNTER
"Incoming call form Elvira Estrella pharmacist [phone] who states they are still having confusion around patient's testosterone. Original order was for subcutaneous injections on 3/19 from Dr. Chatterjee who was precept ing for Dr. Juan. Dr. Juan's note indicates patient to inject subcutaneous. That original order was for 100 mg/ml vial which only comes in 10 ml vials. Dr. Juan resent the prescription for correct concentration of 200 mg/ml however this instruction was to inject into muscle once a week. Per pharmacy, this was not covered because Dr. Juan is not authorized provider. Patient's PCP was contacted as patient/pharmacy thought she was \"restricted\" however she is not. Dr. Avery placed new order for patient with IM injection which pharmacy questioned as original order was for subcutaneous.     Elvira calling to clarify route as injection supplies (needle and syringe) are for subcutaneous. RN spoke to patient who confirmed the plan with Dr. Juan was for subcutaneous injections of testosterone but is open to either route. Patient states Dr. Avery is primary provider but patient wants to see Dr. Juan for HRT. Routing to preceptor to place new order with appropriate dose and route.   María Elena Albright RN    "

## 2021-03-24 NOTE — Clinical Note
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 He does need a letter for his group home stating that he is prescribed testosterone and should be allowed to give himself the weekly injections. He said you could send it via [place]  if that is easiest. Additionally, he has a nurse that visits him and can draw his labs. He will be calling with fax number for where to send orders to for his follow up on 4/22/21. Thanks!

## 2021-03-24 NOTE — TELEPHONE ENCOUNTER
I called pharmacist Elvira to review the directives from Becki Avery and she verbalized a good understanding.     Kaylie Herrera RN BSN  [facility]

## 2021-03-24 NOTE — TELEPHONE ENCOUNTER
Pharmacist Elvira wanted to be sure that the prescription for Testosterone written on 3/22/21 is supposed to be given IM? It had been previously ordered on 3/19/21 by Dr. Pieter Chatterjee as Subcutaneous?      Patient wanted to pick this up this morning. She has an appointment for teaching early this afternoon.     Kaylie Herrera RN BSN  [facility]

## 2021-03-24 NOTE — TELEPHONE ENCOUNTER
Instructions for after your eye muscle surgery:    Instill 1 cm of Maxitrol ophthalmic ointment in the operative eye(s) in 3 times per day until follow-up with Dr. Holland in about 1 week.  The ointment is expected to blur vision but is necessary.      You will also go home with a prescription for a steroid eye drop. Do NOT start this eye drop yet.  When you see Dr. Holland at your post-operative visit he will tell you if and when to start the drops.    Avoid all eye pressure or trauma for 7 days.  No eye rubbing, straining, swimming, athletics, or outdoor activities in which dirt or foreign objects could enter the eye.      ok to take a bath and shower immediately but don t run shower water on face.      Tylenol or ibuprofen over the counter may be used for pain.  Pain can occasionally be moderate for 1 or 2 days after surgery.  If pain is severe or does not improve greatly with over the counter medications call our office.    Return for follow-up with Dr. Holland as already scheduled (generally about 1 week after surgery).  If you do not have an appointment already, please call Steve [facility] at [phone] or our  at [phone] and arrange to follow-up in 1 week.      [facility]  Home Care Following Anesthesia  For 24 hours after surgery:  1. Get plenty of rest.  A responsible adult must stay with you for at least 24 hours after you leave the surgery center.  2. Do not drive or use heavy equipment.  If you have weakness or tingling, don't drive or use heavy equipment until this feeling goes away.   3. Do not drink alcohol.   4. Avoid strenuous or risky activities.  Ask for help when climbing stairs.  5. You may feel lightheaded.  IF so, sit for a few minutes before standing.  Have someone help you get up.   6. If you have nausea (feel sick to your stomach): Drink only clear liquids such as apple juice, ginger ale, broth or 7-Up.  Rest may also help.  Pharmacy calling to clarify directions on testosterone cypionate (DEPOTESTOSTERONE) 200 MG/ML injection. Please Elvira at [phone] ASA as patient has been waiting.    Be sure to drink enough fluids.  Move to a regular diet as you feel able.   7. You may have a slight fever.  Call the doctor if your fever is over 100 F (37.7 C) (taken under the tongue) or lasts longer than 24 hours.  8. You may have a dry mouth, a sore throat, muscle aches or trouble sleeping. These should go away after 24 hours.  9. Do not make important or legal decisions.   Tylenol/Acetaminophen Consumption  To help encourage the safe use of acetaminophen, the makers of TYLENOL  have lowered the maximum daily dose for single-ingredient Extra Strength TYLENOL  (acetaminophen) products sold in the [place] from 8 pills per day (4,000 mg) to 6 pills per day (3,000 mg). The dosing interval has also changed from 2 pills every 4-6 hours to 2 pills every 6 hours.    If you feel your pain relief is insufficient, you may take Tylenol/Acetaminophen in addition to your narcotic pain medication.     Be careful not to exceed 3,000 mg of Tylenol/Acetaminophen in a 24 hour period from all sources.    If you are taking extra strength Tylenol/acetaminophen (500 mg), the maximum dose is 6 tablets in 24 hours.    If you are taking regular strength acetaminophen (325 mg), the maximum dose is 9 tablets in 24 hours.  Next dose of Tylenol after 4:35 pm, if needed.  Follow package instructions.    Call a doctor for any of the followin. Signs of infection (fever, growing tenderness at the surgery site, a large amount of drainage or bleeding, severe pain, foul-smelling drainage, redness, swelling).  2. It has been over 8 to 10 hours since surgery and you are still not able to urinate (pass water).  3. Headache for over 24 hours.  Your doctor is:       Dr. Kavin Holland, Ophthalmology: [phone]               Or dial [phone] and ask for the resident on call for:  Ophthalmology  For emergency care, call the:  [facility]:  [phone] (TTY for hearing impaired: [phone])

## 2021-03-24 NOTE — LETTER
AUTHORIZATION FOR ADMINISTRATION OF MEDICATION AT GROUP HOME     Patient Name: Prakash Prasad    YOB: 1990    I have prescribed the following medication for this patient and request that it be administered at group home.    Medication:    Testosterone cypionate 0.1 mL (20 mg) subQ weekly         Electronically Signed By  Provider: Glenda Juan MD                                                                                 Date: March 27, 2021

## 2021-03-24 NOTE — NURSING NOTE
"Patient presented to clinic with all supplies for testosterone injection teaching. Patient's friend was present at visit.     RN reviewed necessary supplies: difference in needles (drawing up vs injecting), how to read a syringe, how to read medication label, disposal of sharps, and proper hand hygiene.     Discussed how to switch out needles and patient demonstrated understanding of reading syringe. RN reviewed safe needle handling (using \"scoop\" method). Patient independently duane up proper amount of Testosterone.     RN discussed site for SQ injection and reviewed proper SQ injection technique. Patient practiced using injecting pad.    At end of visit, patient independently completed self-injection of 0.1 mL (20 mg) Testosterone into right abdominal tissue under supervision of RN.    Completed visit with discussion of refill policy.     Patient verbalized understanding and was engaged during appointment.    Dr. Dominique was the preceptor on site for this visit and available for questions.    María Elena Albright RN      "

## 2021-03-24 NOTE — TELEPHONE ENCOUNTER
Attempted to call pharmacy-no answer.  Left voicemail.  My reference states to inject testosterone intramuscular.  Previous prescription written on March 22 from Dr. Juan with directions to inject intramuscularly, prescription from March 19 was for subcutaneous injection-I believe this may have been sent in error.  Testosterone injections should be given intramuscularly.    Becki CARVALHOC

## 2021-03-24 NOTE — TELEPHONE ENCOUNTER
Dr. Peters reviewed notes and situation- agreed with subcutaneous injection as route intended by Dr. Juan (testosterone can be given either subcutaneous or IM) and sent new order to [facility].     RN then contacted Elvira at [facility] to confirm medication went through correctly. Elvira confirmed that they had the new rx and it will be ready for  at noon today.     RN then contacted patient and relayed this information. He verbalized understanding and will plan to come to [facility] for his teaching at 1320.   María Elena Albright RN

## 2021-03-25 ENCOUNTER — VIRTUAL VISIT (OUTPATIENT)
Dept: PSYCHOLOGY | Facility: CLINIC | Age: 31
End: 2021-03-25
Payer: MEDICAID

## 2021-03-25 ENCOUNTER — DOCUMENTATION ONLY (OUTPATIENT)
Dept: ANESTHESIOLOGY | Facility: CLINIC | Age: 31
End: 2021-03-25

## 2021-03-25 DIAGNOSIS — F64.0 GENDER DYSPHORIA IN ADOLESCENT AND ADULT: Primary | ICD-10-CM

## 2021-03-25 PROCEDURE — 99207 PR NO BILLABLE SERVICE THIS VISIT: CPT | Performed by: STUDENT IN AN ORGANIZED HEALTH CARE EDUCATION/TRAINING PROGRAM

## 2021-03-25 PROCEDURE — 90832 PSYTX W PT 30 MINUTES: CPT | Mod: HN | Performed by: STUDENT IN AN ORGANIZED HEALTH CARE EDUCATION/TRAINING PROGRAM

## 2021-03-25 NOTE — PROGRESS NOTES
[facility] -  Case Progress Note    Date of Service: 3/25/21   Legal Name: Ayana Prasad  Chosen name: Prakash Prasad (he/him)   : 1990  Medical Record Number: [id number]  Treating Provider: Dora Mazariegos, PhD - Postdoctoral Fellow   Type of Session: Individual  Present in Session: Prakash   Number of Minutes:  30    Video start time: 15:30  Video end time: 16:00    Telemedicine Visit: The patient's condition can be safely assessed and treated via synchronous audio and visual telemedicine encounter.      Reason for Telemedicine Visit: Services only offered telehealth    Originating Site (Patient Location): Patient's home    Distant Site (Provider Location): Provider Remote Setting    Consent:  The patient/guardian has verbally consented to: the potential risks and benefits of telemedicine (video visit) versus in person care; bill my insurance or make self-payment for services provided; and responsibility for payment of non-covered services.     Mode of Communication:  Video Conference via [employer]    As the provider I attest to compliance with applicable laws and regulations related to telemedicine.    Current Symptoms/Status:  Gender Dysphoria: discomfort with birth-assigned sex (female), identifies and expresses gender as male. Dysphoria includes social and anatomical dysphoria. Prakash is pursuing gender-affirming medical interventions and has socially transitioned in most areas of his life.      Significant mental health history, with active diagnoses of Schizoaffective Disorder - Bipolar Type, PTSD, Generalized Anxiety Disorder, ADHD. Mental health symptoms are managed with medication and monitored through routine psychiatry visits and civil commitment. Prakash has therapy and [facility] support. Symptoms are reported to be stable at present.      Significant substance use history, including Opioid use Disorder, Stimulant Use Disorder, Cannabis Use Disorder. Specific substance use diagnoses  unclear and require further assessment and collateral information.      Progress Toward Treatment Goals:   Prakash began masculinizing HRT under care of [facility]  Drafted letter of support for top surgery    Intervention: Modality and Description:  Primary intervention was supportive individual therapy and psychoeducation.   Prakash reported that he began masculinizing HRT this week. Reports that all members of care team aware of this. Discussed importance of regular check-ins during HRT start to monitor mood symptoms. Prakash expressed understanding. Remainder of session spent drafting letter of support for top surgery. Clinician has reached out to primary therapist and will contact psychiatrist for impressions of mental health stability. Letter to be finalized next session. Prakash was engaged in discussion and responsive to intervention. Responses tend to be concrete but Prakash demonstrates understanding of information discussed.      Assignment:  Continue attending to MH stability and use all resources    Interactive Complexity:  There are four specific communication difficulties that complicate the work of the primary psychiatric procedure.  Interactive complexity (+55142) may be reported when at least one of these difficulties is present.    Communication difficulties present during current the psychiatric procedure include:  1. None.    DSM-5 Diagnoses:    F64.0/302.85  - Gender Dysphoria in Adult      F25.0/295.70  - Schizoaffective Disorder, Bipolar Type - per history  F43.10/309.81- Posttraumatic Stress Disorder - per history  F41.1/300.02  - Generalized Anxiety Disorder - per history  F90.9/314.01  - Unspecified Attention Deficit/Hyperactivity Disorder - per history     History of opioid, methamphetamine, and cannabis use disorders.     Plan/Need for Future Services:  Return in two weeks for check-in on mood status and to finalize LOS.        Dora Mazariegos, PhD      TREATMENT  PLAN  Date of Treatment Plan: 3/9/21  Name: Ayana Prasad  : 1990  Medical Record Number: [id number]  Treating Provider: Dora Mazariegos, PhD - Postdoctoral Fellow  Type of Session: Individual  Present in Session: Prakash    Current Status:    Depression/Mood:  History of manic episodes with no current mood issues reported.    Anxiety/Panic:  Worry  Physiological reactivity (PTSD)    Thought:   Auditory Hallucinations    Sensorium:  Reports no problems or symptoms at this time    Behavior/Health:  Reports no problems or symptoms at this time    Chemical Use:  Denies both Alcohol and Drug Abuse - hx of substance dependence    Sexual Problems:  Reports no problems or symptoms at this time    Gender Problems:  Body and social dysphoria     Suicide Risk Assessment:  Assessed Level of Immediate Risk:  YES  Ideation:  NO  Plan:  NO  Means:  NO  Intent:  NO    Homicide Risk Assessment:  Assessed Level of Immediate Risk:  YES  Ideation:  NO  Plan:  NO  Means:  NO  Intent:  NO    Impact of Symptoms on Function:  Decreased Physical/Health Status  Decreased Social/Family Function  Decreased Work Function  Decreased School Function    DSM-5 Diagnoses:     F64.0/302.85  - Gender Dysphoria in Adult      F25.0/295.70  - Schizoaffective Disorder, Bipolar Type - per history  F43.10/309.81- Posttraumatic Stress Disorder - per history  F41.1/300.02  - Generalized Anxiety Disorder - per history  F90.9/314.01  - Unspecified Attention Deficit/Hyperactivity Disorder - per history     History of opioid, methamphetamine, and cannabis use disorders.     Screening Questionnaires:  Please indicate whether the following screening questionnaires have been completed:  CAGE: Yes  PHQ-9: Yes    AVA-7: Yes  Safety Screening: Yes  WHODAS: No    Problem(s):  1. Gender Dysphoria  2. Mental health issues of Schizoaffective Disorder, PTSD, Anxiety, ADHD  3. History of substance abuse    Short-Long Term Goals  Decrease Symptoms  Increase  Coping  Monitor Psych Status    Interventions  [facility]  Cognitive-Behavioral Therapy    Expected Outcomes and Prognosis  Expected improvement    Anticipated Treatment Duration:  Unknown    Frequency of Sessions  2 x / Month    Progress Update (for Plan Update Only)  NA:  1st Treatment Planning    Current Psychoactive Medications:    - Prolixin 25mg injection every two weeks for psychotic symptoms  - Lithium 1200mg  - Adderall 10mg  - Vyvanse 50mg  - Ambien 5mg at bedtime  - Klonopin as needed for anxiety  - Zyprexa as needed.   Discharge & Aftercare Goals: To Be Determined.  Care Coordination: Yes; psychiatrist SOPHIA Elena, [facility]; therapist JULISA Vanegas through [facility]; [facility] worker Nahum Bowman through [facility].     Consent to Treatment:   Patient participated in this treatment planning process and indicated verbal agreement with the above treatment plan.  If patient doesn't sign, indicate why: Telehealth visit due to COVID19 pandemic    Patient Signature: unable to sign - verbal review and consent given through telemedicine  Date: 3/9/21    Provider Signature:     Date: 3/9/21    Supervisor Signature:Hilda Davis PsyD,     Date: 3/14/2021

## 2021-03-25 NOTE — PROGRESS NOTES
The patient reported 50% pain relief after the diagnostic left-sided L5-S1 transforaminal selective nerve root block.  States that pain is slowly coming back.  She does not have any further questions about the procedure.

## 2021-03-27 NOTE — PATIENT INSTRUCTIONS
"Patient Education   Here is the plan from today's visit    1. Elevated glucose  2. Elevated triglycerides  - Low carbohydrate diet, increased exercise  - Hemoglobin A1c ([facility])- test for diabetes, result = pre-diabetes  - Will monitor levels on T     3. Gender dysphoria  - testosterone 20 mg weekly  - needle, disp, 18G X 1\" MISC; Use for drawing up weekly testosterone dose  Dispense: 50 each; Refill: 0  - Needle, Disp, 25G X 5/8\" MISC; Use for injecting weekly testosterone dose  Dispense: 50 each; Refill: 0  - syringe, disposable, 1 ML MISC; Use for weekly testosterone dose  Dispense: 60 each; Refill: 0  - Schedule RN visit once medicine in hand   - Let me and your mental health care team know if you notice worsening of any of your psychiatric symptoms after starting T      Glenda Juan MD       "

## 2021-03-28 NOTE — PROGRESS NOTES
Preceptor Attestation:   Patient seen, evaluated and discussed with the resident. I have verified the content of the note, which accurately reflects my assessment of the patient and the plan of care.   Supervising Physician:  Pieter Chatterjee MD

## 2021-03-31 ENCOUNTER — COMMUNICATION - HEALTHEAST (OUTPATIENT)
Dept: BEHAVIORAL HEALTH | Facility: CLINIC | Age: 31
End: 2021-03-31

## 2021-03-31 ENCOUNTER — OFFICE VISIT - HEALTHEAST (OUTPATIENT)
Dept: BEHAVIORAL HEALTH | Facility: CLINIC | Age: 31
End: 2021-03-31

## 2021-03-31 DIAGNOSIS — F25.9 SCHIZOAFFECTIVE DISORDER, CHRONIC CONDITION (H): ICD-10-CM

## 2021-03-31 DIAGNOSIS — F41.9 ANXIETY DISORDER, UNSPECIFIED TYPE: ICD-10-CM

## 2021-04-01 ENCOUNTER — TELEPHONE (OUTPATIENT)
Dept: NEUROSURGERY | Facility: CLINIC | Age: 31
End: 2021-04-01

## 2021-04-02 ENCOUNTER — VIRTUAL VISIT (OUTPATIENT)
Dept: NEUROSURGERY | Facility: CLINIC | Age: 31
End: 2021-04-02
Payer: MEDICAID

## 2021-04-02 ENCOUNTER — TELEPHONE (OUTPATIENT)
Dept: NEUROSURGERY | Facility: CLINIC | Age: 31
End: 2021-04-02

## 2021-04-02 DIAGNOSIS — M54.17 LUMBOSACRAL RADICULOPATHY AT L5: Primary | ICD-10-CM

## 2021-04-02 PROCEDURE — 99207 PR NO CHARGE LOS: CPT | Performed by: NURSE PRACTITIONER

## 2021-04-02 NOTE — LETTER
4/2/2021       RE: Ayana Prasad  [address] [address]     Dear Colleague,    Thank you for referring your patient, Ayana Prasad, to the [facility] [place] at [facility]. Please see a copy of my visit note below.    Patient appointment will be scheduled at a later time due to issues with insurance coverage.     Georgina Smith DNP  Neurosurgery Nurse Practitioner  [facility]  [phone]        Again, thank you for allowing me to participate in the care of your patient.      Sincerely,    BROOKLYN Valdes CNP

## 2021-04-02 NOTE — PROGRESS NOTES
Patient appointment will be scheduled at a later time due to issues with insurance coverage.     Georgina Smith DNP  Neurosurgery Nurse Practitioner  [facility]  [phone]

## 2021-04-09 ENCOUNTER — TELEPHONE (OUTPATIENT)
Dept: FAMILY MEDICINE | Facility: CLINIC | Age: 31
End: 2021-04-09

## 2021-04-09 NOTE — TELEPHONE ENCOUNTER
Forms received from: [facility]    Phone number listed: [phone]   Fax listed: [phone]  Date received: 04/09/2021  Form description: polst  Once forms are completed, please return to [facility]  via fax.  Form placed: in providers in [place]

## 2021-04-12 ENCOUNTER — VIRTUAL VISIT (OUTPATIENT)
Dept: PSYCHOLOGY | Facility: CLINIC | Age: 31
End: 2021-04-12
Payer: COMMERCIAL

## 2021-04-12 DIAGNOSIS — F64.0 GENDER DYSPHORIA IN ADOLESCENT AND ADULT: Primary | ICD-10-CM

## 2021-04-12 PROCEDURE — 90832 PSYTX W PT 30 MINUTES: CPT | Mod: HN | Performed by: STUDENT IN AN ORGANIZED HEALTH CARE EDUCATION/TRAINING PROGRAM

## 2021-04-12 PROCEDURE — 99207 PR NO BILLABLE SERVICE THIS VISIT: CPT | Performed by: STUDENT IN AN ORGANIZED HEALTH CARE EDUCATION/TRAINING PROGRAM

## 2021-04-12 NOTE — PROGRESS NOTES
[facility]  [facility] Progress Note    Date of Service: 21   Legal Name: Ayana Prasad  Chosen name: Parkash Praasd (he/him)   : 1990  Medical Record Number: [id number]  Treating Provider: Dora Mazariegos, PhD - Postdoctoral Fellow   Type of Session: Individual  Present in Session: Prakash   Number of Minutes:  30  DA Completed: 21  Tx Plan Completed: 3/9/21    Health Maintenance Summary - Mental Health Treatment Plan       Status Date      MENTAL HEALTH TX PLAN Next Due 2022      Done 3/9/2021 HIM MENTAL HEALTH TX PLAN SCAN        Video start time: 13:00  Video end time: 13:30    Telemedicine Visit: The patient's condition can be safely assessed and treated via synchronous audio and visual telemedicine encounter.      Reason for Telemedicine Visit: Services only offered telehealth    Originating Site (Patient Location): Patient's home    Distant Site (Provider Location): Provider Remote Setting    Consent:  The patient/guardian has verbally consented to: the potential risks and benefits of telemedicine (video visit) versus in person care; bill my insurance or make self-payment for services provided; and responsibility for payment of non-covered services.     Mode of Communication:  Video Conference via [employer]    As the provider I attest to compliance with applicable laws and regulations related to telemedicine.    Current Symptoms/Status:  Gender Dysphoria: discomfort with birth-assigned sex (female), identifies and expresses gender as male. Dysphoria includes social and anatomical dysphoria. Prakash is pursuing gender-affirming medical interventions and has socially transitioned in most areas of his life.      Significant mental health history, with active diagnoses of Schizoaffective Disorder - Bipolar Type, PTSD, Generalized Anxiety Disorder, ADHD. Mental health symptoms are managed with medication and monitored through routine psychiatry visits and civil commitment. Prakash  has therapy and [facility] support. Symptoms are reported to be stable at present.      Significant substance use history, including Opioid use Disorder, Stimulant Use Disorder, Cannabis Use Disorder. Specific substance use diagnoses unclear and require further assessment and collateral information.      Progress Toward Treatment Goals:   Prakash began masculinizing HRT under care of [facility]  Planning to come out in group home    Intervention: Modality and Description:  Primary intervention was supportive individual therapy and psychoeducation. Prakash reported that he is generally doing well. He has been on testosterone for three weeks. Notes some changes in skin, no changes in mood or auditory hallucinations. He reported no issues with adherence to injections - has in-home nursing care bi-weekly and will do injection himself on off-weeks. He has a friend who he calls when he self-injects, and this has been helpful support. Prakash shared that he wants to come out in group home setting. Fears non-acceptance and having to come out repeatedly due to rotating staff. Discussed plan of talking with civil commitment  (who Prakash is out to) at her upcoming visit, and talking together with . Discussed asking  for support in communicating chosen name/pronouns to home staff, and was to help other residents learn chosen name. Prakash was engaged and responsive to support and problem-solving. Responses tend to be concrete but Prakash demonstrates understanding of information discussed.      Assignment:  Continue attending to MH stability and use all resources    Interactive Complexity:  There are four specific communication difficulties that complicate the work of the primary psychiatric procedure.  Interactive complexity (+66733) may be reported when at least one of these difficulties is present.    Communication difficulties present during current the psychiatric  procedure include:  1. None.    DSM-5 Diagnoses:    F64.0/302.85  - Gender Dysphoria in Adult      F25.0/295.70  - Schizoaffective Disorder, Bipolar Type - per history  F43.10/309.81- Posttraumatic Stress Disorder - per history  F41.1300.02  - Generalized Anxiety Disorder - per history  F90.9/314.01  - Unspecified Attention Deficit/Hyperactivity Disorder - per history     History of opioid, methamphetamine, and cannabis use disorders.     Plan/Need for Future Services:  Return in two weeks for check-in on mood status and to finalize LOS.        Dora Mazariegos, PhD      TREATMENT PLAN  Date of Treatment Plan: 3/9/21  Name: Ayana Prasad  : 1990  Medical Record Number: [id number]  Treating Provider: Dora Mazariegos, PhD - Postdoctoral Fellow  Type of Session: Individual  Present in Session: Prakash    Current Status:    Depression/Mood:  History of manic episodes with no current mood issues reported.    Anxiety/Panic:  Worry  Physiological reactivity (PTSD)    Thought:   Auditory Hallucinations    Sensorium:  Reports no problems or symptoms at this time    Behavior/Health:  Reports no problems or symptoms at this time    Chemical Use:  Denies both Alcohol and Drug Abuse - hx of substance dependence    Sexual Problems:  Reports no problems or symptoms at this time    Gender Problems:  Body and social dysphoria     Suicide Risk Assessment:  Assessed Level of Immediate Risk:  YES  Ideation:  NO  Plan:  NO  Means:  NO  Intent:  NO    Homicide Risk Assessment:  Assessed Level of Immediate Risk:  YES  Ideation:  NO  Plan:  NO  Means:  NO  Intent:  NO    Impact of Symptoms on Function:  Decreased Physical/Health Status  Decreased Social/Family Function  Decreased Work Function  Decreased School Function    DSM-5 Diagnoses:     F64.0/302.85  - Gender Dysphoria in Adult      F25.0/295.70  - Schizoaffective Disorder, Bipolar Type - per history  F43.10/309.81- Posttraumatic Stress Disorder - per history  F41.1/300.02  -  Generalized Anxiety Disorder - per history  F90.9/314.01  - Unspecified Attention Deficit/Hyperactivity Disorder - per history     History of opioid, methamphetamine, and cannabis use disorders.     Screening Questionnaires:  Please indicate whether the following screening questionnaires have been completed:  CAGE: Yes  PHQ-9: Yes    AVA-7: Yes  Safety Screening: Yes  WHODAS: No    Problem(s):  1. Gender Dysphoria  2. Mental health issues of Schizoaffective Disorder, PTSD, Anxiety, ADHD  3. History of substance abuse    Short-Long Term Goals  Decrease Symptoms  Increase Coping  Monitor Psych Status    Interventions  [facility]  Cognitive-Behavioral Therapy    Expected Outcomes and Prognosis  Expected improvement    Anticipated Treatment Duration:  Unknown    Frequency of Sessions  2 x / Month    Progress Update (for Plan Update Only)  NA:  1st Treatment Planning    Current Psychoactive Medications:    - Prolixin 25mg injection every two weeks for psychotic symptoms  - Lithium 1200mg  - Adderall 10mg  - Vyvanse 50mg  - Ambien 5mg at bedtime  - Klonopin as needed for anxiety  - Zyprexa as needed.   Discharge & Aftercare Goals: To Be Determined.  Care Coordination: Yes; psychiatrist SOPHIA Elena, [facility]; therapist Joana Hagen [facility] through [facility]; ARM worker Nahum Bowman through [facility].     Consent to Treatment:   Patient participated in this treatment planning process and indicated verbal agreement with the above treatment plan.  If patient doesn't sign, indicate why: Telehealth visit due to COVID19 pandemic    Patient Signature: unable to sign - verbal review and consent given through telemedicine  Date: 3/9/21    Provider Signature:     Date: 3/9/21    Supervisor Signature:Hilda Davis PsyD,     Date: 3/14/2021

## 2021-04-14 ENCOUNTER — MYC REFILL (OUTPATIENT)
Dept: FAMILY MEDICINE | Facility: CLINIC | Age: 31
End: 2021-04-14

## 2021-04-14 DIAGNOSIS — N20.0 NEPHROLITHIASIS: ICD-10-CM

## 2021-04-14 DIAGNOSIS — F64.9 GENDER DYSPHORIA: ICD-10-CM

## 2021-04-14 NOTE — PROGRESS NOTES
I did not personally see the patient.  I reviewed and agree with the assessment and plan as documented in this note.     Hilda Davis PsyD, LP

## 2021-04-14 NOTE — TELEPHONE ENCOUNTER
RN reached out to pharmacy as request came in for additional syringe and needle supplies and should have 1 year supply. Pharmacy confirmed that they still have supplies at this time and were unsure where/why request was made.     Sno Jean RN

## 2021-04-15 ENCOUNTER — MYC MEDICAL ADVICE (OUTPATIENT)
Dept: FAMILY MEDICINE | Facility: CLINIC | Age: 31
End: 2021-04-15

## 2021-04-15 DIAGNOSIS — F64.9 GENDER DYSPHORIA: Primary | ICD-10-CM

## 2021-04-15 DIAGNOSIS — F31.11 BIPOLAR 1 DISORDER, MANIC, MILD (H): Chronic | ICD-10-CM

## 2021-04-18 ENCOUNTER — HOSPITAL ENCOUNTER (EMERGENCY)
Facility: CLINIC | Age: 31
Discharge: HOME | End: 2021-04-18
Attending: EMERGENCY MEDICINE | Admitting: EMERGENCY MEDICINE
Payer: COMMERCIAL

## 2021-04-18 ENCOUNTER — NURSE TRIAGE (OUTPATIENT)
Dept: NURSING | Facility: CLINIC | Age: 31
End: 2021-04-18

## 2021-04-18 VITALS
HEART RATE: 102 BPM | RESPIRATION RATE: 18 BRPM | WEIGHT: 220 LBS | BODY MASS INDEX: 35.36 KG/M2 | HEIGHT: 66 IN | SYSTOLIC BLOOD PRESSURE: 178 MMHG | DIASTOLIC BLOOD PRESSURE: 109 MMHG | OXYGEN SATURATION: 98 % | TEMPERATURE: 97.6 F

## 2021-04-18 DIAGNOSIS — T88.7XXA MEDICATION SIDE EFFECTS: ICD-10-CM

## 2021-04-18 DIAGNOSIS — F25.0 SCHIZOAFFECTIVE DISORDER, BIPOLAR TYPE (H): ICD-10-CM

## 2021-04-18 DIAGNOSIS — F90.2 ATTENTION DEFICIT HYPERACTIVITY DISORDER (ADHD), COMBINED TYPE: Primary | ICD-10-CM

## 2021-04-18 DIAGNOSIS — R44.0 AUDITORY HALLUCINATION: ICD-10-CM

## 2021-04-18 PROCEDURE — 99282 EMERGENCY DEPT VISIT SF MDM: CPT

## 2021-04-18 ASSESSMENT — ENCOUNTER SYMPTOMS
NERVOUS/ANXIOUS: 1
ACTIVITY CHANGE: 1
DYSPHORIC MOOD: 0
FATIGUE: 1
VOMITING: 0
ABDOMINAL PAIN: 0

## 2021-04-18 ASSESSMENT — MIFFLIN-ST. JEOR: SCORE: 1734.66

## 2021-04-18 NOTE — TELEPHONE ENCOUNTER
Ayana has been out of Vyvanse 50 mg and Adderall 10 mg for 3 days.    Per [employer] message noted in Chart Review:  ---------------------------------------------------------------------------------------  Prakash Prasad  to Becki Avery APRN CNP          2:48 PM  Also Klonopin 1 mg twice a day as needed           12:20 PM  Hanane Gan RN routed this conversation to Becki Avery APRN CNP Hannah, Sutton J  to Becki Avery APRN CNP          12:16 PM  [facility] Lithium 300mg in AM, 900mg at PM. VYVANSE 50mg in AM, Adderall 10mg in Afternoon, Ambien 5 mg in PM.  I dont have fax numbers I can get you them Monday.  ---------------------------------------------------------------------------------------    These medications were prescribed by her psychiatrist with [facility] in [place]    Advised to contact [place] [facility] for on-call provider.  If not available and if symptoms severe, consider ER     Notified that message would be sent to PCP clinic    Alena Armstrong RN  [facility]

## 2021-04-19 ENCOUNTER — PATIENT OUTREACH (OUTPATIENT)
Dept: CARE COORDINATION | Facility: CLINIC | Age: 31
End: 2021-04-19

## 2021-04-19 ENCOUNTER — TELEPHONE (OUTPATIENT)
Dept: FAMILY MEDICINE | Facility: CLINIC | Age: 31
End: 2021-04-19

## 2021-04-19 ENCOUNTER — TELEPHONE (OUTPATIENT)
Dept: PSYCHIATRY | Facility: CLINIC | Age: 31
End: 2021-04-19

## 2021-04-19 DIAGNOSIS — Z71.89 OTHER SPECIFIED COUNSELING: ICD-10-CM

## 2021-04-19 DIAGNOSIS — F25.0 SCHIZOAFFECTIVE DISORDER, BIPOLAR TYPE (H): ICD-10-CM

## 2021-04-19 DIAGNOSIS — M54.17 LUMBOSACRAL RADICULOPATHY: Primary | ICD-10-CM

## 2021-04-19 RX ORDER — ZOLPIDEM TARTRATE 5 MG/1
5 TABLET ORAL AT BEDTIME
Qty: 30 TABLET | Refills: 0 | Status: SHIPPED | OUTPATIENT
Start: 2021-04-19 | End: 2021-05-13

## 2021-04-19 RX ORDER — LISDEXAMFETAMINE DIMESYLATE 50 MG/1
50 CAPSULE ORAL EVERY MORNING
Qty: 30 CAPSULE | Refills: 0 | Status: SHIPPED | OUTPATIENT
Start: 2021-04-19 | End: 2021-05-13

## 2021-04-19 RX ORDER — CLONAZEPAM 1 MG/1
1 TABLET ORAL 2 TIMES DAILY PRN
Qty: 30 TABLET | Refills: 0 | Status: SHIPPED | OUTPATIENT
Start: 2021-04-19 | End: 2021-05-14

## 2021-04-19 RX ORDER — ONDANSETRON 4 MG/1
4 TABLET, ORALLY DISINTEGRATING ORAL EVERY 8 HOURS PRN
Qty: 10 TABLET | Refills: 0 | Status: SHIPPED | OUTPATIENT
Start: 2021-04-19 | End: 2021-09-13

## 2021-04-19 RX ORDER — LITHIUM CARBONATE 300 MG/1
TABLET, FILM COATED, EXTENDED RELEASE ORAL
Qty: 120 TABLET | Refills: 0 | Status: CANCELLED | OUTPATIENT
Start: 2021-04-19

## 2021-04-19 RX ORDER — DEXTROAMPHETAMINE SACCHARATE, AMPHETAMINE ASPARTATE, DEXTROAMPHETAMINE SULFATE AND AMPHETAMINE SULFATE 2.5; 2.5; 2.5; 2.5 MG/1; MG/1; MG/1; MG/1
TABLET ORAL
Qty: 30 TABLET | Refills: 0 | Status: SHIPPED | OUTPATIENT
Start: 2021-04-19 | End: 2021-05-14

## 2021-04-19 RX ORDER — LITHIUM CARBONATE 300 MG/1
TABLET, FILM COATED, EXTENDED RELEASE ORAL
Qty: 120 TABLET | Refills: 0 | Status: SHIPPED | OUTPATIENT
Start: 2021-04-19 | End: 2021-05-14

## 2021-04-19 NOTE — TELEPHONE ENCOUNTER
"PSYCHIATRY CLINIC PHONE INTAKE     SERVICES REQUESTED / INTERESTED IN          Other:  NA[facility]AGTE/FIRST EPISODE    Presenting Problem and Brief History                              Is the patient between the ages of 15-40? Yes  Is the patient experiencing or recently experienced symptoms of psychosis? Yes. Auditory hallucinations for patient. He hears voices and can mostly understand what they are saying. Patient completed the [facility] program in 2018 and was referred to [facility] program but due to a long hospitalization around the same time reports being \"dropped\" from the program. Patient is interested again and feels he would really benefit from services through there.  How long has pt been taking anti-psychotics? For 10 years      Please read to the patient/family: Our FEP Program is offering a two tiered assessment process. The first appointment is with an experienced FEP clinician to complete an initial screening, explore what services someone is interested in and assist in addressing immediate questions/concerns rather than having to wait until the diagnostic assessment appointment which can be a matter of weeks. The screening will also help determine which program someone may be eligible for and with whom the diagnostic assessment should be scheduled with. The overall goal is to help people as efficiently and effectively as possible. In preparation for the appointment we ask that you request any/all behavioral health records be faxed to [phone]. Thank you.       Substance Use History     Do you have any history of alcohol / illicit drug use?  Yes  Describe:  No current substance use, but reports use in the past.  Have you ever received treatment for this?  No    Describe:  None     Social History     Who is the patient's a guardian?  Yes    Name / number: self  Have you had an ACT team in last 12 months?  No  Describe: None   Do you have any current or past legal issues?  No  Describe: None   OK to leave " a detailed voicemail?  Yes    Medical/ Surgical History                                   Patient Active Problem List   Diagnosis     ADHD (attention deficit hyperactivity disorder)     Bipolar 1 disorder, manic, mild     Marijuana abuse     Polysubstance abuse (H)     GERD (gastroesophageal reflux disease)     Tobacco abuse     Abdominal pain, right upper quadrant     Intractable back pain     Optic neuritis     Cauda equina syndrome with neurogenic bladder (H)     Schizoaffective disorder, bipolar type (H)     PTSD (post-traumatic stress disorder)     Anxiety     Auditory hallucination     Class 2 obesity due to excess calories in adult     Nephrolithiasis     Cyst of left ovary     Borderline personality disorder (H)     Cannabis dependence (H)     Depression     Episodic mood disorder (H)     History of heroin abuse (H)     Moderate episode of recurrent major depressive disorder (H)     Opioid use disorder, severe, dependence (H)     Substance-induced psychotic disorder with hallucinations (H)     Nausea     Overdose     Suicidal ideation     Bella (H)     Spell of altered consciousness     Urinary retention     Obesity (BMI 35.0-39.9) with comorbidity (H)     Chronic bilateral low back pain without sciatica     AVA (generalized anxiety disorder)     Aggressive behavior     Gender identity disorder     Lumbosacral radiculopathy at L5     DUB (dysfunctional uterine bleeding)     Seizure-like activity (H)          Medications             Current Outpatient Medications   Medication Sig Dispense Refill     amphetamine-dextroamphetamine (ADDERALL) 10 MG tablet Take 1 tablet daily in the afternoon. 30 tablet 0     clonazePAM (KLONOPIN) 1 MG tablet Take 1 tablet (1 mg) by mouth 2 times daily as needed for anxiety 30 tablet 0     fluPHENAZine decanoate (PROLIXIN) 25 MG/ML injection INJECT 1ML (25MG) INTRAMUSCULARLY EVERY 14 DAYS 6 mL 0     gabapentin (NEURONTIN) 300 MG capsule TAKE 3 CAPSULES (900MG) AND TAKE 4 CAPSULES  "(1200MG ) BY MOUTH MIDDAY DAY AND TAKE 3 CAPSULES (900MG) BY MOUTH EVERY EVENING 300 capsule 11     lisdexamfetamine (VYVANSE) 50 MG capsule Take 1 capsule (50 mg) by mouth every morning 30 capsule 0     lithium ER (LITHOBID) 300 MG CR tablet Take one tab (1) each morning and three (3) tabs at bedtime 120 tablet 0     needle, disp, 18G X 1\" MISC Use for drawing up weekly testosterone dose 50 each 0     Needle, Disp, 25G X 5/8\" MISC Use for injecting weekly testosterone dose 50 each 0     OLANZapine (ZYPREXA) 2.5 MG tablet Take 2.5 mg by mouth 2 times daily as needed       omeprazole (PRILOSEC) 20 MG DR capsule TAKE 1 CAPSULE BY MOUTH DAILY 30 capsule 3     ondansetron (ZOFRAN ODT) 4 MG ODT tab Take 1 tablet (4 mg) by mouth every 8 hours as needed (Patient not taking: Reported on 3/23/2021) 10 tablet 0     syringe, disposable, 1 ML MISC Use for weekly testosterone dose 60 each 0     testosterone cypionate (DEPOTESTOSTERONE) 200 MG/ML injection Inject 0.1 mLs (20 mg) Subcutaneous once a week 4 mL 0     zolpidem (AMBIEN) 5 MG tablet Take 1 tablet (5 mg) by mouth At Bedtime 30 tablet 0         DISPOSITION      Forwarded to Jewels Sourav for review.     Edward Stringer       "

## 2021-04-19 NOTE — DISCHARGE INSTRUCTIONS
I think majority of your symptoms are due to your central nervous system getting used to the lack of a stimulant for the first time in years.  Please come back to the ER immediately with any depression, thoughts of hurting yourself, or with any other concerns.  Be absolutely certain to follow with your regular doctor, and continue to see a provider to get additional refills.

## 2021-04-19 NOTE — TELEPHONE ENCOUNTER
Patient is part of restricted program.  He is restricted to certain prescribers.  Prescription sent    Becki JENKINS

## 2021-04-19 NOTE — ED TRIAGE NOTES
"Pt has not had her vyvance or adderall for 4 days d/t insurance problems, states she does not feel well, \"like I'm walking through [employer]\".  "

## 2021-04-19 NOTE — PROGRESS NOTES
Clinic Care Coordination Contact    PC to patient,. Patient was on hold with her insurance company and asked for CHW to call her back later today or tomorrow. CHW will call patient back 4/20/21.

## 2021-04-19 NOTE — ED PROVIDER NOTES
"  History   Chief Complaint:  Withdrawal        The history is provided by the patient.      Ayana Prasad is a 30 year old adult with history of ADHD who presents due to Adderall withdrawal. The patient has been taking a stimulant for ADHD for the past 10 years. She has been taking Adderall  And Vyvanse for the past 6 months and ran out 5 days ago due to an insurance issue. Yesterday she began to feel fatigued like she was \"wading through jello\". She denies any mental health concerns such as depression and suicidal or homicidal ideation. No chest pain, abdominal pain, or vomiting. She has Adderall coming in the mail which should arrive tomorrow night.     Review of Systems   Constitutional: Positive for activity change and fatigue.   Cardiovascular: Negative for chest pain.   Gastrointestinal: Negative for abdominal pain and vomiting.   Psychiatric/Behavioral: Negative for dysphoric mood, self-injury and suicidal ideas. The patient is nervous/anxious.    All other systems reviewed and are negative.        Allergies:  Haldol [Haloperidol]  Adhesive Tape  Percocet [Oxycodone-Acetaminophen]  Prednisone  Risperidone  Tramadol Hcl  Droperidol  Seroquel [Quetiapine]    Medications:  Amphetamine-dextroamphetamine   Clonazepam   Fluphenazine decanoate   Gabapentin   Lisdexamfetamine   Lithium   Olanzapine   Omeprazole   Ondansetron   Testosterone cypionate   Zolpidem     Past Medical History:     ADHD  Auditory hallucination   Bipolar 1 disorder  Borderline personality disorder  Cannabis dependence   Cauda equina syndrome  Chronic low back pain  Depression  AVA  GERD  Lumbosacral radiculopathy L5  Bella   Marginal corneal ulcer, left  Nephrolithiasis  Ovarian cyst, left   Overdose   Polysubstance abuse  PTSD  Schizoaffective disorder   Seizure-like activity   Suicidal ideation   TBI    Past Surgical History:    Back surgery, cauda equina syndrome  Colonoscopy  Combined cystoscopy, insert stent ureters, left  Combined " "cystoscopy, retrogrades, exchange stent ureters, left  Combined cystoscopy, retrogrades, ureteroscopy, insert stent, left  Cystoscopy, ureteroscopy, combined  Esophagoscopy, gastroscopy, duodenoscopy, combined x3  Inject epidural transforaminal lumbar/sacral  Laparoscopic cholecystectomy  Laser holmium lithotripsy ureters, insert stent, combined  Transurethral stone resection     Family History:    Anxiety disorder   Cancer  Chron's disease  Diabetes  Heart disease   Hyperlipidemia  Hypertension    Social History:  Presents unaccompanied to the ED  Transgender male (female-to-male)  He/him/his pronouns  PCP: Becki Avery, CNP  Drug use: history of polysubstance abuse    Physical Exam     Patient Vitals for the past 24 hrs:   BP Temp Temp src Pulse Resp SpO2 Height Weight   04/18/21 1925 (!) 178/109 97.6  F (36.4  C) Temporal 102 18 98 % 1.676 m (5' 6\") 99.8 kg (220 lb)       Physical Exam  Vitals: reviewed by me  General: Pt seen on [facility], pleasant, cooperative, and alert to conversation  Eyes: Tracking well, clear conjunctiva BL  ENT: MMM, midline trachea.   Lungs: No tachypnea, no accessory muscle use. No respiratory distress.   CV: Rate as above  MSK: no joint effusion.  No evidence of trauma  Skin: No rash  Neuro: Clear speech and no facial droop.  Psych: Not RIS, no e/o AH/VH.  Slightly tired appearing, and subdued appearing.  No suicidality, denies homicidality.      Emergency Department Course     Emergency Department Course:    Reviewed:  I reviewed nursing notes, vitals, past medical history and care everywhere    Assessments:  1950 I obtained history and examined the patient as noted above.     Disposition:  The patient was discharged to home.     Impression & Plan     Medical Decision Making:  Ayana Prasad is a very pleasant 30 year old transgender female-to-male, prefers the name Prakash and he/him pronouns, who presents to the emergency room with what appears to be withdrawal effect " from her stimulants. For about 10 years she has been on some combination of Ritalin, Vyvanse, and Adderall, and now she has been off of it for four days. Understandably, she is very tired and fatigued. She has no suicidality or homicidality, no mental health complaints, and no medical complaints apart from being tired. She has a prescription coming in tomorrow at 6PM, and I think this is sufficient. I do not feel comfortable giving these specific medications as I feel it may be outside of the scope of the ER. I talked about what to expect tomorrow, red flags for when to come back to the ER, and the importance of outpatient followup. Will plan for discharge as above.       Covid-19  Ayana Prasad was evaluated during a global COVID-19 pandemic, which necessitated consideration that the patient might be at risk for infection with the SARS-CoV-2 virus that causes COVID-19.   Applicable protocols for evaluation were followed during the patient's care.   COVID-19 was considered as part of the patient's evaluation.     Diagnosis:    ICD-10-CM    1. Medication side effects  T88.7XXA        Scribe Disclosure:  I, Charmaine Bartlett, am serving as a scribe at 7:48 PM on 4/18/2021 to document services personally performed by Oswaldo Baig MD based on my observations and the provider's statements to me.        Oswaldo Baig MD  04/18/21 6701

## 2021-04-19 NOTE — ED NOTES
Bed: [facility]  Expected date:   Expected time:   Means of arrival:   Comments:  Triage [facility]

## 2021-04-19 NOTE — TELEPHONE ENCOUNTER
To health and sexual health referrals previously placed.  Neurosurgery referral placed.  Please fax to you care representative.    We will fill out referral forms for specialty providers to prescribe his medications.    My Chart message sent to patient re: COVID vaccine    Becki CARVALHOC

## 2021-04-19 NOTE — TELEPHONE ENCOUNTER
Patient states he needs another referral to neurosurgery for his back issues. Will then need referrals to mental health, sexual health and neurosurgery all faxed to [facility] representative. Phone number to reach [facility] representative is [phone].      Patient also states he will need prescribing referral forms completed and faxed to allow Dr. Rivero from mental health and Dr. Stroud from sexual health to prescribe him medications.     Patient states we need to contact [facility] representative on his behalf to get correct referral forms and fax number. I called and left a message for the [facility] representative to return my call.     Patient also questioning the safety of the covid vaccine. He needs reassurance from provider before feeling comfortable receiving it. Would like Pictelahart message with response.     Cassia Price CMA

## 2021-04-20 NOTE — PROGRESS NOTES
Clinic Care Coordination Contact  Community Health Worker Initial Outreach    CHW Initial Information Gathering:  Referral Source: Care Team  Preferred Hospital: [facility]  [phone]  Current living arrangement:: I live in a private home  Community Resources: Financial/Insurance  No PCP office visit in Past Year: No(3/23/21 with Becki Avery)  CHW Additional Questions  MyChart active?: Yes  Patient sent Social Determinants of Health questionnaire?: No    Patient accepts CC: Yes. Patient scheduled for assessment with SRINIVAS Kenyon on 4/23/21 at 10:00. Patient noted desire to discuss patient being on retricted program and not getting medications consistently then having withdrwals. Would like to discuss how to possibly get off restricted medication program..

## 2021-04-20 NOTE — TELEPHONE ENCOUNTER
[facility] representative returned my call. She faxed over the forms we need to fill out for referrals. Will plan to complete and have Becki Avery sign when she returns to clinic.   Cassia Price CMA

## 2021-04-20 NOTE — TELEPHONE ENCOUNTER
Second message left for are representative to return my call. Pod 4 number given.   Cassia Price CMA

## 2021-04-21 ENCOUNTER — OFFICE VISIT - HEALTHEAST (OUTPATIENT)
Dept: BEHAVIORAL HEALTH | Facility: CLINIC | Age: 31
End: 2021-04-21

## 2021-04-21 ENCOUNTER — TELEPHONE (OUTPATIENT)
Dept: FAMILY MEDICINE | Facility: CLINIC | Age: 31
End: 2021-04-21

## 2021-04-21 ENCOUNTER — MYC MEDICAL ADVICE (OUTPATIENT)
Dept: FAMILY MEDICINE | Facility: CLINIC | Age: 31
End: 2021-04-21

## 2021-04-21 DIAGNOSIS — F90.0 ATTENTION DEFICIT HYPERACTIVITY DISORDER (ADHD), PREDOMINANTLY INATTENTIVE TYPE: ICD-10-CM

## 2021-04-21 DIAGNOSIS — F06.30 MOOD DISORDER IN CONDITIONS CLASSIFIED ELSEWHERE: ICD-10-CM

## 2021-04-21 DIAGNOSIS — F25.9 SCHIZOAFFECTIVE DISORDER, CHRONIC CONDITION (H): ICD-10-CM

## 2021-04-21 DIAGNOSIS — F64.9 GENDER DYSPHORIA: ICD-10-CM

## 2021-04-21 DIAGNOSIS — F41.9 ANXIETY DISORDER, UNSPECIFIED TYPE: ICD-10-CM

## 2021-04-21 RX ORDER — TESTOSTERONE CYPIONATE 200 MG/ML
20 INJECTION, SOLUTION INTRAMUSCULAR WEEKLY
Qty: 4 ML | Refills: 0 | Status: SHIPPED | OUTPATIENT
Start: 2021-04-21 | End: 2021-04-22

## 2021-04-21 NOTE — TELEPHONE ENCOUNTER
RN called [facility] to determine insurance issue for patient's testosterone. Per pharmacy, since patient is a restricted recipient with insurance, testosterone refill needs to come from patient's PCP (Becki Avery) until there is a [place]'s provider who can sign for testosterone with patient's restricted recipient status. Pharmacy stated they would fax refill request to PCP for review.     Update: Called patient PCP clinic to update restricted recipient information to include Dr. Linton in addition to Dr. Juan (as residents are unable to prescribe with MA). Staff member Martha took all information from author and updated existing forms and will send them to [facility]. Stated that Becki BARAHONA CNP has received testosterone prescription from [facility] and would be working on this soon. Will route to provider as FYWAYNE.     Son Jean RN

## 2021-04-21 NOTE — TELEPHONE ENCOUNTER
Forms received from: [facility]   Phone number listed: [phone]   Fax listed: [phone]  Date received: 04/21/2021  Form description: [facility]  Once forms are completed, please return to [facility]  via fax.  Form placed: in providers in mirian [place]

## 2021-04-22 ENCOUNTER — VIRTUAL VISIT (OUTPATIENT)
Dept: FAMILY MEDICINE | Facility: CLINIC | Age: 31
End: 2021-04-22
Payer: COMMERCIAL

## 2021-04-22 ENCOUNTER — PATIENT OUTREACH (OUTPATIENT)
Dept: CARE COORDINATION | Facility: CLINIC | Age: 31
End: 2021-04-22

## 2021-04-22 DIAGNOSIS — F64.9 GENDER DYSPHORIA: ICD-10-CM

## 2021-04-22 PROCEDURE — 99214 OFFICE O/P EST MOD 30 MIN: CPT | Mod: 95 | Performed by: STUDENT IN AN ORGANIZED HEALTH CARE EDUCATION/TRAINING PROGRAM

## 2021-04-22 RX ORDER — ESCITALOPRAM OXALATE 10 MG/1
10 TABLET ORAL DAILY
COMMUNITY
End: 2021-05-13

## 2021-04-22 RX ORDER — TESTOSTERONE CYPIONATE 200 MG/ML
40 INJECTION, SOLUTION INTRAMUSCULAR WEEKLY
Qty: 10 ML | Refills: 0 | Status: SHIPPED | OUTPATIENT
Start: 2021-04-22 | End: 2021-05-15

## 2021-04-22 NOTE — PROGRESS NOTES
Preceptor Attestation:    I discussed the patient with the resident and evaluated the patient in person. I have verified the content of the note, which accurately reflects my assessment of the patient and the plan of care.   Supervising Physician:  Isaac Linton MD.

## 2021-04-22 NOTE — Clinical Note
Hello! Can you fax the future lab orders I placed today (HRT labs) to patient's home RN # [phone]? Thanks! Patti

## 2021-04-22 NOTE — PROGRESS NOTES
Prakash is a 30 year old who is being evaluated via a billable video visit.      How would you like to obtain your AVS? MyChart  If the video visit is dropped, the invitation should be resent by: Text to cell phone: [phone]  Will anyone else be joining your video visit? No    Video Start Time: 1:10 PM    Assessment & Plan     Gender dysphoria  30 year old transgender male presenting for follow up of gender affirming hormone therapy which was started one month ago, doing well. Started with low dose 20 mg weekly, patient interested in increasing dose today which I felt was reasonable given stable mood symptoms. Anticipate goal 70-80 mg weekly. Monitor hallucinations/voices on T - stable. Also reviewed gender therapist notes (Dr. Mazariegos), no concerns for mood destabilization noted.  - testosterone cypionate (DEPOTESTOSTERONE) 200 MG/ML injection; Inject 0.2 mLs (40 mg) Subcutaneous once a week    Contraception: Depo   Counseled on controlled medication: Yes     Acne, worsening  Increased cystic sounding acne involving neck and back since starting T. Previously referred to Dermatology due to history of acne during puberty. Encouraged washing twice daily with a benzoyl peroxide wash. Gave contact information to schedule with Dermatology.     Prediabetes  Elevated BMI (35)  Hypertriglyceridemia  Baseline labs showed elevated glucose (not fasting) of 112 and hypertriglyceridemia. A1c 5.8. Working on dietary and exercise changes (precontemplative) and will follow labs.   - Repeat labs in 2 months - fasting lipids, glucose   - Consider statin vs Fenofibrate, omega 3's if worsening     Return in about 2 months (around 6/22/2021) for with me, T follow up after labs done.   In home care nurse can draw labs - fax them the orders # [phone] **fasting** -- will route to Care Coordinator to fax lab orders    Glenda Juan MD  [facility]    Davin Randall is a 30 year old individual  "that uses pronouns He/Him/His/Himself that presents today for follow up of: masculinizing hormone therapy. Gender identity: male    HPI   Any special concerns today?  Wanting to increase Testosterone dose     On hormones?  YES +++   Testosterone cypionate 200 mg/mL 20 mg (0.1 mL) subQ weekly - started 3/24/2021    Shot day of the week? Wednesday        Due for labs?  No   - Baseline labs 3/01/2021 significant for elevated , non-fasting glucose 112 (A1c 5.8)  Refills of meds needed?  Yes    Acne: worse over the past week  - deep, not painful, only smaller ones form white heads   - using charcoal wipes  - Dermatology referral was placed prior to initiating T due to history of acne       Gender affirmation is being sought in these other ways:  - Dress, hair, name  - Consultation scheduled with Dr. Mayers 9/2021  - Interested in top and bottom surgery               Review of Systems:        General    Fat redistribution: no    Weight change: no HEENT    Voice change: no     Cardiovascular (CV)    Chest Pains: no    Shortness of breath: no Chest    Decreased exercise tolerance:  no    Breast changes/development: no     Gastrointestinal (GI)    Abdominal pain: no    Change in appetite: no Skin    Acne or oily skin: yes- acne, oily skin, rough skin     Change in hair: no     Genitourinary ()    Abnormal vaginal bleeding: no     Decreased spontaneous erections: no    Change in libido: no    New sexual partners: no Musculoskeletal    Leg pain or swelling: no     Psychiatric (Psych)    Depression: no - no change in auditory hallucinations     Anxiety/Panic: no    Mood:  \"good\"                  Objective       Vitals:  No vitals were obtained today due to virtual visit.    Physical Exam   GENERAL: Healthy, alert and no distress  EYES: Eyes grossly normal to inspection.  No discharge or erythema, or obvious scleral/conjunctival abnormalities.  RESP: No audible wheeze, cough, or visible cyanosis.  No visible retractions " or increased work of breathing.    SKIN: Visible skin clear. No significant rash, abnormal pigmentation or lesions.  NEURO: Cranial nerves grossly intact.  Mentation and speech appropriate for age.  PSYCH: Mentation appears normal, affect normal/bright, judgement and insight intact, normal speech and appearance well-groomed.    Office Visit on 03/19/2021   Component Date Value Ref Range Status     Hemoglobin A1C 03/19/2021 5.8* 4.1 - 5.7 % Final           Video-Visit Details    Type of service:  Video Visit  Video End Time:1:25 PM  Originating Location (pt. Location): Home  Distant Location (provider location):  [facility]   Platform used for Video Visit: Decalog

## 2021-04-22 NOTE — PATIENT INSTRUCTIONS
Please call Dermatology regarding setting up an appointment to discuss management of acne:   [facility]   [address]   [address] [place] [address]   Phone: [phone]     Acne wash:   - benzoyl peroxide wash 1-2 times daily -- NOTE: can bleach clothes/towels         - Consider retinol cream/lotion daily after washing

## 2021-04-22 NOTE — PROGRESS NOTES
Clinic Care Coordination Contact    PC to patient to cancel [facility] RN Assessment. Advised patient to talk with her  regarding restricted medication program. Then regarding Medical Marijuana patient will need to work with PCP and Psychiatry regarding that.     No other needs at this time. CCC RN Assessment canceled.

## 2021-04-27 ENCOUNTER — OFFICE VISIT - HEALTHEAST (OUTPATIENT)
Dept: BEHAVIORAL HEALTH | Facility: CLINIC | Age: 31
End: 2021-04-27

## 2021-04-27 ENCOUNTER — TELEPHONE (OUTPATIENT)
Dept: FAMILY MEDICINE | Facility: CLINIC | Age: 31
End: 2021-04-27

## 2021-04-27 DIAGNOSIS — F25.9 SCHIZOAFFECTIVE DISORDER, CHRONIC CONDITION (H): ICD-10-CM

## 2021-04-27 DIAGNOSIS — F98.8 ADD (ATTENTION DEFICIT DISORDER) WITHOUT HYPERACTIVITY: ICD-10-CM

## 2021-04-27 DIAGNOSIS — F41.9 ANXIETY DISORDER, UNSPECIFIED TYPE: ICD-10-CM

## 2021-04-27 NOTE — TELEPHONE ENCOUNTER
Reason for Call:  Other appointment    Detailed comments: Ayana would like to know if she can bring her emotional support dog to her upcoming appt    Phone Number Patient can be reached at: Cell number on file:    Telephone Information:   Mobile [phone]       Best Time: anytime    Can we leave a detailed message on this number? YES    Call taken on 4/27/2021 at 12:12 PM by Gab Dorsey

## 2021-04-28 ENCOUNTER — TRANSFERRED RECORDS (OUTPATIENT)
Dept: HEALTH INFORMATION MANAGEMENT | Facility: CLINIC | Age: 31
End: 2021-04-28

## 2021-04-28 LAB
ALT SERPL-CCNC: 46 IU/L (ref 8–45)
AST SERPL-CCNC: 31 IU/L (ref 2–40)
CHOLEST SERPL-MCNC: 181 MG/DL (ref 100–199)
GLUCOSE SERPL-MCNC: 133 MG/DL (ref 65–140)
HDLC SERPL-MCNC: 37 MG/DL
LDLC SERPL CALC-MCNC: 81 MG/DL
NONHDLC SERPL-MCNC: 144 MG/DL
TRIGL SERPL-MCNC: 317 MG/DL

## 2021-04-28 NOTE — TELEPHONE ENCOUNTER
Patient notified and voiced understanding and agreement.  Jeanna Sidhu RN  [facility]

## 2021-04-28 NOTE — TELEPHONE ENCOUNTER
Confirmed with -can bring emotional support pet however, Pet must be controlled by patient at all times.  If Pet is distracting through visit, barking etc. will need to leave.    Becki CARVALHOC     14

## 2021-04-29 ENCOUNTER — VIRTUAL VISIT (OUTPATIENT)
Dept: PSYCHOLOGY | Facility: CLINIC | Age: 31
End: 2021-04-29
Payer: COMMERCIAL

## 2021-04-29 DIAGNOSIS — F64.0 GENDER DYSPHORIA IN ADOLESCENT AND ADULT: Primary | ICD-10-CM

## 2021-04-29 PROCEDURE — 99207 PR NO BILLABLE SERVICE THIS VISIT: CPT | Performed by: STUDENT IN AN ORGANIZED HEALTH CARE EDUCATION/TRAINING PROGRAM

## 2021-04-29 PROCEDURE — 99207 PR NO BILLABLE SERVICE THIS VISIT: CPT | Mod: 95 | Performed by: STUDENT IN AN ORGANIZED HEALTH CARE EDUCATION/TRAINING PROGRAM

## 2021-04-29 NOTE — PROGRESS NOTES
[facility] -  Case Progress Note    Date of Service: 21   Legal Name: Ayana Prasad  Chosen name: Prakash Prasad (he/him)   : 1990  Medical Record Number: [id number]  Treating Provider: Dora Mazariegos, PhD - Postdoctoral Fellow   Type of Session: Individual  Present in Session: Prakash   Number of Minutes:  10  DA Completed: 21  Tx Plan Completed: 3/9/21    Health Maintenance Summary - Mental Health Treatment Plan       Status Date      MENTAL HEALTH TX PLAN Next Due 2022      Done 3/9/2021 HIM MENTAL HEALTH TX PLAN SCAN        Video start time: 16:05  Video end time: 16:15    Telemedicine Visit: The patient's condition can be safely assessed and treated via synchronous audio and visual telemedicine encounter.      Reason for Telemedicine Visit: Services only offered telehealth    Originating Site (Patient Location): Patient's home    Distant Site (Provider Location): Provider Remote Setting    Consent:  The patient/guardian has verbally consented to: the potential risks and benefits of telemedicine (video visit) versus in person care; bill my insurance or make self-payment for services provided; and responsibility for payment of non-covered services.     Mode of Communication:  Video Conference via [employer]    As the provider I attest to compliance with applicable laws and regulations related to telemedicine.    Current Symptoms/Status:  Gender Dysphoria: discomfort with birth-assigned sex (female), identifies and expresses gender as male. Dysphoria includes social and anatomical dysphoria. Prakash is pursuing gender-affirming medical interventions and has socially transitioned in most areas of his life.      Significant mental health history, with active diagnoses of Schizoaffective Disorder - Bipolar Type, PTSD, Generalized Anxiety Disorder, ADHD. Mental health symptoms are managed with medication and monitored through routine psychiatry visits and civil commitment. Prakash  "has therapy and [facility] support. Symptoms are reported to be stable at present.      Significant substance use history, including Opioid use Disorder, Stimulant Use Disorder, Cannabis Use Disorder. Specific substance use diagnoses unclear and require further assessment and collateral information.      Progress Toward Treatment Goals:   Prakash began masculinizing HRT under care of [facility] - 5 weeks on, increased dose last visit.  Planning to come out in group home    Intervention: Modality and Description:  Primary intervention was supportive individual therapy. Appointment was brief per Prakash's request. He presented lying down with headphones/hands over his ears, and appeared distracted and tired. He offered one-word answers to questions - reported mood is \"fine,\" voices are \"the same.\" Reported he hadn't taken testosterone dose for this week yet, denied need for support from friend or nursing care to do this. He requested to end due to fatigue.       Assignment:  Continue attending to MH stability and use all resources    Interactive Complexity:  There are four specific communication difficulties that complicate the work of the primary psychiatric procedure.  Interactive complexity (+[id number]) may be reported when at least one of these difficulties is present.    Communication difficulties present during current the psychiatric procedure include:  1. None.    DSM-5 Diagnoses:    F64.0/302.85  - Gender Dysphoria in Adult      F25.0/295.70  - Schizoaffective Disorder, Bipolar Type - per history  F43.10/309.81- Posttraumatic Stress Disorder - per history  F41.1/300.02  - Generalized Anxiety Disorder - per history  F90.9/314.01  - Unspecified Attention Deficit/Hyperactivity Disorder - per history     History of opioid, methamphetamine, and cannabis use disorders.     Plan/Need for Future Services:  Return in two weeks for check-in on mood status.  Writer to connect with Dr. Juan re: possible " increase in symptoms        Dora Mazariegos, PhD      TREATMENT PLAN  Date of Treatment Plan: 3/9/21  Name: Ayana Prasad  : 1990  Medical Record Number: [id number]  Treating Provider: Dora Mazariegos, PhD - Postdoctoral Fellow  Type of Session: Individual  Present in Session: Prakash    Current Status:    Depression/Mood:  History of manic episodes with no current mood issues reported.    Anxiety/Panic:  Worry  Physiological reactivity (PTSD)    Thought:   Auditory Hallucinations    Sensorium:  Reports no problems or symptoms at this time    Behavior/Health:  Reports no problems or symptoms at this time    Chemical Use:  Denies both Alcohol and Drug Abuse - hx of substance dependence    Sexual Problems:  Reports no problems or symptoms at this time    Gender Problems:  Body and social dysphoria     Suicide Risk Assessment:  Assessed Level of Immediate Risk:  YES  Ideation:  NO  Plan:  NO  Means:  NO  Intent:  NO    Homicide Risk Assessment:  Assessed Level of Immediate Risk:  YES  Ideation:  NO  Plan:  NO  Means:  NO  Intent:  NO    Impact of Symptoms on Function:  Decreased Physical/Health Status  Decreased Social/Family Function  Decreased Work Function  Decreased School Function    DSM-5 Diagnoses:     F64.0/302.85  - Gender Dysphoria in Adult      F25.0/295.70  - Schizoaffective Disorder, Bipolar Type - per history  F43.10/309.81- Posttraumatic Stress Disorder - per history  F41.1/300.02  - Generalized Anxiety Disorder - per history  F90.9/314.01  - Unspecified Attention Deficit/Hyperactivity Disorder - per history     History of opioid, methamphetamine, and cannabis use disorders.     Screening Questionnaires:  Please indicate whether the following screening questionnaires have been completed:  CAGE: Yes  PHQ-9: Yes    AAV-7: Yes  Safety Screening: Yes  WHODAS: No    Problem(s):  1. Gender Dysphoria  2. Mental health issues of Schizoaffective Disorder, PTSD, Anxiety, ADHD  3. History of substance  abuse    Short-Long Term Goals  Decrease Symptoms  Increase Coping  Monitor Psych Status    Interventions  [facility]  Cognitive-Behavioral Therapy    Expected Outcomes and Prognosis  Expected improvement    Anticipated Treatment Duration:  Unknown    Frequency of Sessions  2 x / Month    Progress Update (for Plan Update Only)  NA:  1st Treatment Planning    Current Psychoactive Medications:    - Prolixin 25mg injection every two weeks for psychotic symptoms  - Lithium 1200mg  - Adderall 10mg  - Vyvanse 50mg  - Ambien 5mg at bedtime  - Klonopin as needed for anxiety  - Zyprexa as needed.   Discharge & Aftercare Goals: To Be Determined.  Care Coordination: Yes; psychiatrist SOPHIA Elena, [facility]; therapist Joana Hagen [facility] through [facility]; [facility] worker Nahum Bowman through [employer].     Consent to Treatment:   Patient participated in this treatment planning process and indicated verbal agreement with the above treatment plan.  If patient doesn't sign, indicate why: Telehealth visit due to COVID19 pandemic    Patient Signature: unable to sign - verbal review and consent given through telemedicine  Date: 3/9/21    Provider Signature:     Date: 3/9/21    Supervisor Signature:Hilda Davis PsyD,     Date: 3/14/2021

## 2021-05-04 NOTE — PROGRESS NOTES
Assessment & Plan     Elevated blood pressure reading without diagnosis of hypertension  Stable-blood pressure controlled today in clinic.  Plan to check blood pressure out in the community and notify if is consistently (50% of time) above 130/90.     We will assist in finding new psychiatrist.  Will fill out insurance forms requested by insurance.  [facility].      14 minutes spent on the date of the encounter doing chart review, history and exam, documentation and further activities per the note       No follow-ups on file.    [facility] SHAILESH Randall is a 30 year old who presents for the following health issues     HPI     Hypertension Follow-up      Do you check your blood pressure regularly outside of the clinic? No     Are you following a low salt diet? Yes    Are your blood pressures ever more than 140 on the top number (systolic) OR more   than 90 on the bottom number (diastolic), for example 140/90? Not checking      How many servings of fruits and vegetables do you eat daily?  0-1    On average, how many sweetened beverages do you drink each day (Examples: soda, juice, sweet tea, etc.  Do NOT count diet or artificially sweetened beverages)?   0    How many days per week do you exercise enough to make your heart beat faster? 7    How many minutes a day do you exercise enough to make your heart beat faster? 20 - 29    How many days per week do you miss taking your medication? 0      Going to be looking for a new psychiatrist.  Current psychiatrist is not able to prescribe even though referral form has been filled out.  Would like to find someone who is able to care for all needs.  Feels that mental health is currently well controlled.  Recently got an emotional support animal, Nugget.    Continues with testosterone injections.  Feels like it is going well.    Home care nurses do check blood pressure.   Has been around 150/100.  They check with wrist cuff.  Is not able to sit still and is constantly shaking legs.    Review of Systems   Constitutional: Negative for fatigue.   HENT: Negative for ear pain, rhinorrhea and sore throat.    Eyes: Negative for visual disturbance.   Respiratory: Negative for cough, chest tightness, shortness of breath and wheezing.    Cardiovascular: Negative for chest pain and palpitations.   Gastrointestinal: Negative for abdominal distention, abdominal pain, constipation, diarrhea, nausea and vomiting.   Endocrine: Negative for cold intolerance and heat intolerance.   Musculoskeletal: Negative for arthralgias and myalgias.   Skin: Negative for rash.   Neurological: Negative for dizziness, light-headedness, numbness and headaches.   Psychiatric/Behavioral: Negative for dysphoric mood and sleep disturbance. The patient is not nervous/anxious.           Objective    /88 (BP Location: Right arm, Patient Position: Sitting, Cuff Size: Adult Large)   Pulse 64   Temp 97.8  F (36.6  C) (Tympanic)   Resp 20   Wt 104.2 kg (229 lb 12.8 oz)   BMI 37.09 kg/m    Body mass index is 37.09 kg/m .  Physical Exam  Constitutional:       Appearance: He is well-developed.   Cardiovascular:      Rate and Rhythm: Normal rate and regular rhythm.   Pulmonary:      Effort: Pulmonary effort is normal.      Breath sounds: Normal breath sounds.   Skin:     General: Skin is warm.   Neurological:      Mental Status: He is alert.

## 2021-05-05 ENCOUNTER — TELEPHONE (OUTPATIENT)
Dept: FAMILY MEDICINE | Facility: CLINIC | Age: 31
End: 2021-05-05

## 2021-05-05 ENCOUNTER — OFFICE VISIT (OUTPATIENT)
Dept: FAMILY MEDICINE | Facility: CLINIC | Age: 31
End: 2021-05-05
Payer: COMMERCIAL

## 2021-05-05 VITALS
SYSTOLIC BLOOD PRESSURE: 130 MMHG | WEIGHT: 229.8 LBS | TEMPERATURE: 97.8 F | HEART RATE: 64 BPM | DIASTOLIC BLOOD PRESSURE: 88 MMHG | RESPIRATION RATE: 20 BRPM | BODY MASS INDEX: 37.09 KG/M2

## 2021-05-05 DIAGNOSIS — R03.0 ELEVATED BLOOD PRESSURE READING WITHOUT DIAGNOSIS OF HYPERTENSION: Primary | ICD-10-CM

## 2021-05-05 PROCEDURE — 99213 OFFICE O/P EST LOW 20 MIN: CPT | Performed by: NURSE PRACTITIONER

## 2021-05-05 ASSESSMENT — ENCOUNTER SYMPTOMS
COUGH: 0
FATIGUE: 0
CONSTIPATION: 0
HEADACHES: 0
SHORTNESS OF BREATH: 0
NERVOUS/ANXIOUS: 0
ARTHRALGIAS: 0
DIARRHEA: 0
WHEEZING: 0
SLEEP DISTURBANCE: 0
VOMITING: 0
MYALGIAS: 0
NUMBNESS: 0
DIZZINESS: 0
SORE THROAT: 0
NAUSEA: 0
ABDOMINAL DISTENTION: 0
PALPITATIONS: 0
CHEST TIGHTNESS: 0
DYSPHORIC MOOD: 0
LIGHT-HEADEDNESS: 0
ABDOMINAL PAIN: 0
RHINORRHEA: 0

## 2021-05-05 ASSESSMENT — PAIN SCALES - GENERAL: PAINLEVEL: NO PAIN (0)

## 2021-05-05 NOTE — TELEPHONE ENCOUNTER
Forms received from: [facility]   Phone number listed:    Fax listed: [phone]  Date received: 05/05/2021  Form description: lab orders  Once forms are completed, please return to [facility] via fax.  Form placed: in providers folder  Benita Shipman

## 2021-05-06 ENCOUNTER — MYC MEDICAL ADVICE (OUTPATIENT)
Dept: FAMILY MEDICINE | Facility: CLINIC | Age: 31
End: 2021-05-06

## 2021-05-06 DIAGNOSIS — N20.0 NEPHROLITHIASIS: ICD-10-CM

## 2021-05-06 DIAGNOSIS — F90.2 ATTENTION DEFICIT HYPERACTIVITY DISORDER (ADHD), COMBINED TYPE: ICD-10-CM

## 2021-05-06 DIAGNOSIS — R44.0 AUDITORY HALLUCINATION: ICD-10-CM

## 2021-05-06 DIAGNOSIS — F25.0 SCHIZOAFFECTIVE DISORDER, BIPOLAR TYPE (H): ICD-10-CM

## 2021-05-07 NOTE — TELEPHONE ENCOUNTER
Below information sent to patient per elda.  Patient is asking if PCP can prescribe meds.        What kind of meds are you asking about?    Which pharmacy do you prefer? We need to verify correct pharmacy with each medication request.      Just an FYI, Becki is out of the office until Monday, so she may not respond until then.    Warm regards,    [facility] Nursing Team

## 2021-05-11 NOTE — PROGRESS NOTES
Prakash is a 30 year old who is being evaluated via a billable video visit.      How would you like to obtain your AVS? MyChart  If the video visit is dropped, the invitation should be resent by: Text to cell phone: .  Will anyone else be joining your video visit? No      Video Start Time: 4:45 PM    Assessment & Plan     Gender dysphoria  30 year old transgender male with history of bipolar, borderline personality disorder and TBI, recently initiated gender affirming hormone therapy 2 months ago. He has auditory hallucinations (occasionally command) at baseline and there have been concerns raised by providers regarding increased hallucinations since initiating testosterone. All providers are new to him over the past 2 months and, per patient, the voices are no worse than they have been in the past. He actually notes feeling significant improvement in his depression symptoms since starting T. He regularly follows with a therapist, psychologist, PCP and myself and states he feels well supported. He denies SI or HI. He is in the process of finding a new psychiatrist. Given that we as his new providers are still learning Prakash's baseline and the strong supports he has in place, as well as overall reassuring assessment today, I ultimately elected to continue his testosterone with close monitoring. He also saw his psychologist earlier today, whom I communicated with, and they were also in agreement with this plan.   - No changes made today, will continue Testosterone cypionate 200 mg/mL 40 mg (0.2 mL) subQ weekly  - Close follow up -- with PCP and psychology next week,     Return in about 3 weeks (around 6/2/2021) for Follow up, with me, using a video visit.    Glenda Juan MD  [facility]    Davin Randall is a 30 year old individual that uses pronouns He/Him/His/Himself that presents today for follow up of: masculinizing hormone therapy. Gender identity: male    HPI     Any  "special concerns today?   - Message from patient's psychologist re: worsening voices,   - was going through depression, started before T and has since ended  - was hearing voices with other therapist ([facility]), this happens once in a while, not new -- has gotten better  - previous to T heard voices, still hearing voices but does not feel like it has changed, was hearing more direct voices- \"wanted blood\"  - occasionally tell him to hurt himself, has not gotten better or worse since starting T - does not act on them   - \"[facility] caught me on two bad, stressed out days when they (voices) were louder than normal\" - overall feels good relationship with [facility], just started with them about two months ago   - next visit with Dora at end of the month, had an appointment with her earlier today too which he states went well   - \"really needs someone to prescribe psych medications, not allowed to prescribe him meds because he is on MA\"     On hormones?  YES +++   Testosterone cypionate 200 mg/mL 20 mg (0.1 mL) subQ weekly - started 3/24/2021     Shot day of the week? Wednesday        Due for labs?  No   - Baseline labs 3/01/2021 significant for elevated , non-fasting glucose 112 (A1c 5.8)  Refills of meds needed?  Yes     Acne: worse over the past week  - deep, not painful, only smaller ones form white heads   - using charcoal wipes  - Dermatology referral was placed prior to initiating T due to history of acne         Gender affirmation is being sought in these other ways:  - Dress, hair, name  - Consultation scheduled with Dr. Mayers 9/2021  - Interested in top and bottom surgery       Review of Systems    ROS: 10 point ROS neg other than the symptoms noted above in the HPI.       Objective       Vitals:  No vitals were obtained today due to virtual visit.    Physical Exam   GENERAL: Healthy, alert and no distress  RESP: No audible wheeze, cough.  NEURO: Cranial nerves grossly intact.  Mentation and speech appropriate for " age.  PSYCH: Mentation appears normal, affect normal/bright, judgement and insight intact, normal speech and appearance well-groomed. Intermittently distracted by his dog      Video-Visit Details  Type of service:  Video Visit  Video End Time:5:04 PM  Originating Location (pt. Location): Home  Distant Location (provider location):  [facility]   Platform used for Video Visit: Evens Juan MD

## 2021-05-12 ENCOUNTER — VIRTUAL VISIT (OUTPATIENT)
Dept: FAMILY MEDICINE | Facility: CLINIC | Age: 31
End: 2021-05-12
Payer: COMMERCIAL

## 2021-05-12 ENCOUNTER — VIRTUAL VISIT (OUTPATIENT)
Dept: PSYCHOLOGY | Facility: CLINIC | Age: 31
End: 2021-05-12
Payer: COMMERCIAL

## 2021-05-12 DIAGNOSIS — F64.9 GENDER DYSPHORIA: ICD-10-CM

## 2021-05-12 DIAGNOSIS — F64.0 GENDER DYSPHORIA IN ADOLESCENT AND ADULT: Primary | ICD-10-CM

## 2021-05-12 PROCEDURE — 99214 OFFICE O/P EST MOD 30 MIN: CPT | Mod: 95 | Performed by: STUDENT IN AN ORGANIZED HEALTH CARE EDUCATION/TRAINING PROGRAM

## 2021-05-12 PROCEDURE — 90832 PSYTX W PT 30 MINUTES: CPT | Mod: 95 | Performed by: STUDENT IN AN ORGANIZED HEALTH CARE EDUCATION/TRAINING PROGRAM

## 2021-05-12 PROCEDURE — 99207 PR NO BILLABLE SERVICE THIS VISIT: CPT | Performed by: STUDENT IN AN ORGANIZED HEALTH CARE EDUCATION/TRAINING PROGRAM

## 2021-05-12 NOTE — PROGRESS NOTES
Preceptor Attestation:   I discussed the patient with the resident. Patient seen and evaluated via video visit. I have verified the content of the note, which accurately reflects my assessment of the patient and the plan of care.   Supervising Physician:  Felicitas Garibay MD.

## 2021-05-12 NOTE — PROGRESS NOTES
[facility] -  Case Progress Note    Date of Service: 21   Legal Name: Ayana Prasad  Chosen name: Prakash Prasad (he/him)   : 1990  Medical Record Number: [id number]  Treating Provider: Dora Mazariegos, PhD - Postdoctoral Fellow   Type of Session: Individual  Present in Session: Prakash   Number of Minutes:  30  DA Completed: 21  Tx Plan Completed: 3/9/21    Health Maintenance Summary - Mental Health Treatment Plan       Status Date      MENTAL HEALTH TX PLAN Next Due 2022      Done 3/9/2021 HIM MENTAL HEALTH TX PLAN SCAN        Video start time: 10:00  Video end time: 10:30    Telemedicine Visit: The patient's condition can be safely assessed and treated via synchronous audio and visual telemedicine encounter.      Reason for Telemedicine Visit: Services only offered telehealth    Originating Site (Patient Location): Patient's home    Distant Site (Provider Location): Provider Remote Setting    Consent:  The patient/guardian has verbally consented to: the potential risks and benefits of telemedicine (video visit) versus in person care; bill my insurance or make self-payment for services provided; and responsibility for payment of non-covered services.     Mode of Communication:  Video Conference via [employer]    As the provider I attest to compliance with applicable laws and regulations related to telemedicine.    Current Symptoms/Status:  Gender Dysphoria: discomfort with birth-assigned sex (female), identifies and expresses gender as male. Dysphoria includes social and anatomical dysphoria. Prakash is pursuing gender-affirming medical interventions and has socially transitioned in most areas of his life.      Significant mental health history, with active diagnoses of Schizoaffective Disorder - Bipolar Type, PTSD, Generalized Anxiety Disorder, ADHD. Mental health symptoms are managed with medication and monitored through routine psychiatry visits and civil commitment. Prakash  "has therapy and [facility] support. Symptoms are reported to be stable at present.      Significant substance use history, including Opioid use Disorder, Stimulant Use Disorder, Cannabis Use Disorder. Specific substance use diagnoses unclear and require further assessment and collateral information.      Progress Toward Treatment Goals:   Prakash began masculinizing HRT under care of [facility] - psychiatric symptoms are reported to be improved this week.    Intervention: Modality and Description:  Primary intervention was supportive individual therapy. Prakash reported that he is generally doing well. He presented today seated upright, more engaged and less distracted than prior session. Reported he is taking HRT as prescribed. Discussed my concern for apparent increase in auditory hallucinations based on last session and report of other MH provider. Prakash reported that auditory hallucinations fluctuate in intensity according to stress - \"they can be really good or really bad.\" Reported that prior two sessions with mental health therapist and last session with me occurred on \"bad\" days - \"but honestly, those were some of the easier bad days.\" He reported that he aicha by doing his best to ignore the voices. Discussed importance of having treatment team informed of symptoms status given potential of HRT to exacerbate symptoms. He will meet with Dr. Juan at [facility] this afternoon.   He shared that he is no longer working to come out in group home setting, as he plans to move in a few weeks. Doesn't feel this setting is a good fit for him, and wants more independence. All support services (e.g. case management and home nursing care) will continue after the move.  Prakash was engaged today and responsive to feedback.       Assignment:  Continue attending to MH stability and use all resources    Interactive Complexity:  There are four specific communication difficulties that complicate the work of the " primary psychiatric procedure.  Interactive complexity (+[id number]) may be reported when at least one of these difficulties is present.    Communication difficulties present during current the psychiatric procedure include:  1. None.    DSM-5 Diagnoses:    F64.0/302.85  - Gender Dysphoria in Adult      F25.0/295.70  - Schizoaffective Disorder, Bipolar Type - per history  F43.10/309.81- Posttraumatic Stress Disorder - per history  F41.1/300.02  - Generalized Anxiety Disorder - per history  F90.9/314.01  - Unspecified Attention Deficit/Hyperactivity Disorder - per history     History of opioid, methamphetamine, and cannabis use disorders.     Plan/Need for Future Services:  Return in two weeks for check-in on mood status and to finalize LOS.        Dora Mazariegos, PhD      TREATMENT PLAN  Date of Treatment Plan: 3/9/21  Name: Ayana Prasad  : 1990  Medical Record Number: [id number]  Treating Provider: Dora Mazariegos, PhD - Postdoctoral Fellow  Type of Session: Individual  Present in Session: Prakash    Current Status:    Depression/Mood:  History of manic episodes with no current mood issues reported.    Anxiety/Panic:  Worry  Physiological reactivity (PTSD)    Thought:   Auditory Hallucinations    Sensorium:  Reports no problems or symptoms at this time    Behavior/Health:  Reports no problems or symptoms at this time    Chemical Use:  Denies both Alcohol and Drug Abuse - hx of substance dependence    Sexual Problems:  Reports no problems or symptoms at this time    Gender Problems:  Body and social dysphoria     Suicide Risk Assessment:  Assessed Level of Immediate Risk:  YES  Ideation:  NO  Plan:  NO  Means:  NO  Intent:  NO    Homicide Risk Assessment:  Assessed Level of Immediate Risk:  YES  Ideation:  NO  Plan:  NO  Means:  NO  Intent:  NO    Impact of Symptoms on Function:  Decreased Physical/Health Status  Decreased Social/Family Function  Decreased Work Function  Decreased School Function    DSM-5  Diagnoses:     F64.0/302.85  - Gender Dysphoria in Adult      F25.0/295.70  - Schizoaffective Disorder, Bipolar Type - per history  F43.10/309.81- Posttraumatic Stress Disorder - per history  F41.1/300.02  - Generalized Anxiety Disorder - per history  F90.9/314.01  - Unspecified Attention Deficit/Hyperactivity Disorder - per history     History of opioid, methamphetamine, and cannabis use disorders.     Screening Questionnaires:  Please indicate whether the following screening questionnaires have been completed:  CAGE: Yes  PHQ-9: Yes    AVA-7: Yes  Safety Screening: Yes  WHODAS: No    Problem(s):  1. Gender Dysphoria  2. Mental health issues of Schizoaffective Disorder, PTSD, Anxiety, ADHD  3. History of substance abuse    Short-Long Term Goals  Decrease Symptoms  Increase Coping  Monitor Psych Status    Interventions  [facility]  Cognitive-Behavioral Therapy    Expected Outcomes and Prognosis  Expected improvement    Anticipated Treatment Duration:  Unknown    Frequency of Sessions  2 x / Month    Progress Update (for Plan Update Only)  NA:  1st Treatment Planning    Current Psychoactive Medications:    - Prolixin 25mg injection every two weeks for psychotic symptoms  - Lithium 1200mg  - Adderall 10mg  - Vyvanse 50mg  - Ambien 5mg at bedtime  - Klonopin as needed for anxiety  - Zyprexa as needed.   Discharge & Aftercare Goals: To Be Determined.  Care Coordination: Yes; psychiatrist SOPHIA Elena, [facility]; therapist Joana Hagen [facility] through [facility]; ARM worker Nahum Bowman through \[facility]\"".     Consent to Treatment:   Patient participated in this treatment planning process and indicated verbal agreement with the above treatment plan.  If patient doesn't sign, indicate why: Telehealth visit due to COVID19 pandemic    Patient Signature: unable to sign - verbal review and consent given through telemedicine  Date: 3/9/21    Provider Signature:     Date:  3/9/21    Supervisor Signature:Hilda Davis PsyD, LP    Date: 3/14/2021

## 2021-05-12 NOTE — Clinical Note
-   Please call Prakash to schedule a follow up visit via AVS follow up instructions.      Thanks,  Glenda Juan MD

## 2021-05-13 DIAGNOSIS — R44.0 AUDITORY HALLUCINATION: ICD-10-CM

## 2021-05-13 DIAGNOSIS — R11.2 NON-INTRACTABLE VOMITING WITH NAUSEA, UNSPECIFIED VOMITING TYPE: ICD-10-CM

## 2021-05-14 ENCOUNTER — OFFICE VISIT - HEALTHEAST (OUTPATIENT)
Dept: BEHAVIORAL HEALTH | Facility: CLINIC | Age: 31
End: 2021-05-14

## 2021-05-14 DIAGNOSIS — F41.9 ANXIETY DISORDER, UNSPECIFIED TYPE: ICD-10-CM

## 2021-05-14 DIAGNOSIS — F25.9 SCHIZOAFFECTIVE DISORDER, CHRONIC CONDITION (H): ICD-10-CM

## 2021-05-14 DIAGNOSIS — F06.30 MOOD DISORDER IN CONDITIONS CLASSIFIED ELSEWHERE: ICD-10-CM

## 2021-05-15 ENCOUNTER — MYC REFILL (OUTPATIENT)
Dept: FAMILY MEDICINE | Facility: CLINIC | Age: 31
End: 2021-05-15

## 2021-05-15 DIAGNOSIS — F64.9 GENDER DYSPHORIA: ICD-10-CM

## 2021-05-17 ENCOUNTER — MYC REFILL (OUTPATIENT)
Dept: FAMILY MEDICINE | Facility: CLINIC | Age: 31
End: 2021-05-17

## 2021-05-17 DIAGNOSIS — Z53.9 DIAGNOSIS NOT YET DEFINED: Primary | ICD-10-CM

## 2021-05-17 DIAGNOSIS — F64.9 GENDER DYSPHORIA: ICD-10-CM

## 2021-05-17 RX ORDER — TESTOSTERONE CYPIONATE 200 MG/ML
40 INJECTION, SOLUTION INTRAMUSCULAR WEEKLY
Qty: 10 ML | Refills: 0 | Status: CANCELLED | OUTPATIENT
Start: 2021-05-17

## 2021-05-17 RX ORDER — ZOLPIDEM TARTRATE 5 MG/1
TABLET ORAL
Qty: 30 TABLET | OUTPATIENT
Start: 2021-05-17

## 2021-05-17 NOTE — TELEPHONE ENCOUNTER
"Prescription approved per [facility] Refill Protocol.  Requested Prescriptions   Pending Prescriptions Disp Refills     omeprazole (PRILOSEC) 20 MG DR capsule [Pharmacy Med Name: Omeprazole 20MG CPDR] 30 capsule 3     Sig: TAKE 1 CAPSULE BY MOUTH DAILY       PPI Protocol Passed - 5/13/2021  5:13 PM        Passed - Not on Clopidogrel (unless Pantoprazole ordered)        Passed - No diagnosis of osteoporosis on record        Passed - Recent (12 mo) or future (30 days) visit within the authorizing provider's specialty     Patient has had an office visit with the authorizing provider or a provider within the authorizing providers department within the previous 12 mos or has a future within next 30 days. See \"Patient Info\" tab in inbasket, or \"Choose Columns\" in Meds & Orders section of the refill encounter.              Passed - Medication is active on med list        Passed - Patient is age 18 or older        Passed - No active pregnacy on record        Passed - No positive pregnancy test in past 12 months          "

## 2021-05-18 ENCOUNTER — HOSPITAL ENCOUNTER (EMERGENCY)
Facility: CLINIC | Age: 31
Discharge: HOME | End: 2021-05-18
Payer: COMMERCIAL

## 2021-05-18 ENCOUNTER — VIRTUAL VISIT (OUTPATIENT)
Dept: FAMILY MEDICINE | Facility: CLINIC | Age: 31
End: 2021-05-18
Payer: COMMERCIAL

## 2021-05-18 VITALS
WEIGHT: 230 LBS | BODY MASS INDEX: 36.96 KG/M2 | DIASTOLIC BLOOD PRESSURE: 91 MMHG | SYSTOLIC BLOOD PRESSURE: 139 MMHG | HEART RATE: 110 BPM | HEIGHT: 66 IN | RESPIRATION RATE: 16 BRPM | OXYGEN SATURATION: 98 % | TEMPERATURE: 98.2 F

## 2021-05-18 DIAGNOSIS — F64.9 GENDER DYSPHORIA: ICD-10-CM

## 2021-05-18 DIAGNOSIS — F25.0 SCHIZOAFFECTIVE DISORDER, BIPOLAR TYPE (H): Primary | ICD-10-CM

## 2021-05-18 PROCEDURE — 99214 OFFICE O/P EST MOD 30 MIN: CPT | Mod: 95 | Performed by: NURSE PRACTITIONER

## 2021-05-18 RX ORDER — TESTOSTERONE CYPIONATE 200 MG/ML
40 INJECTION, SOLUTION INTRAMUSCULAR WEEKLY
Qty: 10 ML | Refills: 4 | Status: SHIPPED | OUTPATIENT
Start: 2021-05-18 | End: 2021-05-20

## 2021-05-18 ASSESSMENT — MIFFLIN-ST. JEOR: SCORE: 1780.02

## 2021-05-18 NOTE — PROGRESS NOTES
Prakash is a 30 year old who is being evaluated via a billable telephone visit.      What phone number would you like to be contacted at? [phone]  How would you like to obtain your AVS? MyChart    Assessment & Plan     Gender dysphoria  Gender care notes reviewed.  Refill sent.  Will refer to care coordination-request for removal from restricted program  - testosterone cypionate (DEPOTESTOSTERONE) 200 MG/ML injection; Inject 0.2 mLs (40 mg) Subcutaneous once a week  - CARE COORDINATION REFERRAL    Schizoaffective disorder, bipolar type (H)  Will refer to care coordination-request for removal from restricted program.  - CARE COORDINATION REFERRAL    Review of external notes as documented elsewhere in note  Prescription drug management  16 minutes spent on the date of the encounter doing chart review, history and exam, documentation and further activities per the note       No follow-ups on file.    BROOKLYN Cruz [facility] SHAILESH Randall is a 30 year old who presents for the following health issues     HPI       Medication Followup of tesosterone cypionate 200 mg/ml injection    Taking Medication as prescribed: yes. Referral for other providers to prescribe did not go through.     Side Effects:  None    Medication Helping Symptoms:  yes       Phone call completed due to COVID 19 outbreak.     Insurance will not approve any     As of April 1st insurance changed.  Since then has not been able to get psychiatric or gender care medications.  This office has completed multiple request forms for additional prescribers which have been denied.  Is due for testosterone tomorrow.  Current prescriber Dr. Juan attempted to send prescription which was denied.  Prakash needs refill of testosterone.  Also is seeing Dr. Paredes for psychiatric care.  That prescribers prescriptions are being denied as well.      Review of Systems         Objective           Vitals:  No vitals were  obtained today due to virtual visit.    Physical Exam   healthy, alert and no distress  PSYCH: Alert and oriented times 3; coherent speech, normal   rate and volume, able to articulate logical thoughts, able   to abstract reason, no tangential thoughts, no hallucinations   or delusions  His affect is normal and pleasant  RESP: No cough, no audible wheezing, able to talk in full sentences  Remainder of exam unable to be completed due to telephone visits              Phone call duration: 8 minutes

## 2021-05-19 ENCOUNTER — PATIENT OUTREACH (OUTPATIENT)
Dept: CARE COORDINATION | Facility: CLINIC | Age: 31
End: 2021-05-19

## 2021-05-19 ENCOUNTER — HOSPITAL ENCOUNTER (EMERGENCY)
Facility: CLINIC | Age: 31
Discharge: NURSING, REHAB OR LONG-TERM CARE | End: 2021-05-19
Attending: EMERGENCY MEDICINE | Admitting: EMERGENCY MEDICINE
Payer: COMMERCIAL

## 2021-05-19 VITALS
SYSTOLIC BLOOD PRESSURE: 127 MMHG | TEMPERATURE: 98.3 F | WEIGHT: 230 LBS | HEART RATE: 102 BPM | BODY MASS INDEX: 36.96 KG/M2 | DIASTOLIC BLOOD PRESSURE: 90 MMHG | OXYGEN SATURATION: 99 % | HEIGHT: 66 IN | RESPIRATION RATE: 18 BRPM

## 2021-05-19 DIAGNOSIS — T50.904A OVERDOSE, UNDETERMINED INTENT, INITIAL ENCOUNTER: ICD-10-CM

## 2021-05-19 DIAGNOSIS — F31.9 BIPOLAR AFFECTIVE DISORDER, REMISSION STATUS UNSPECIFIED (H): ICD-10-CM

## 2021-05-19 LAB
AMPHETAMINES UR QL SCN: POSITIVE
ANION GAP SERPL CALCULATED.3IONS-SCNC: 8 MMOL/L (ref 3–14)
APAP SERPL-MCNC: <2 MG/L (ref 10–20)
BARBITURATES UR QL: NEGATIVE
BASOPHILS # BLD AUTO: 0.1 10E9/L (ref 0–0.2)
BASOPHILS NFR BLD AUTO: 0.5 %
BENZODIAZ UR QL: NEGATIVE
BUN SERPL-MCNC: 11 MG/DL (ref 7–30)
CALCIUM SERPL-MCNC: 9.8 MG/DL (ref 8.5–10.1)
CANNABINOIDS UR QL SCN: NEGATIVE
CHLORIDE SERPL-SCNC: 113 MMOL/L (ref 94–109)
CO2 SERPL-SCNC: 19 MMOL/L (ref 20–32)
COCAINE UR QL: NEGATIVE
CREAT SERPL-MCNC: 0.81 MG/DL (ref 0.52–1.04)
DIFFERENTIAL METHOD BLD: ABNORMAL
EOSINOPHIL # BLD AUTO: 0.4 10E9/L (ref 0–0.7)
EOSINOPHIL NFR BLD AUTO: 2.8 %
ERYTHROCYTE [DISTWIDTH] IN BLOOD BY AUTOMATED COUNT: 14.9 % (ref 10–15)
ETHANOL SERPL-MCNC: <0.01 G/DL
GFR SERPL CREATININE-BSD FRML MDRD: >90 ML/MIN/{1.73_M2}
GLUCOSE SERPL-MCNC: 101 MG/DL (ref 70–99)
HCT VFR BLD AUTO: 41.6 % (ref 35–47)
HGB BLD-MCNC: 13.6 G/DL (ref 11.7–15.7)
IMM GRANULOCYTES # BLD: 0.1 10E9/L (ref 0–0.4)
IMM GRANULOCYTES NFR BLD: 0.4 %
INTERPRETATION ECG - MUSE: NORMAL
LABORATORY COMMENT REPORT: NORMAL
LYMPHOCYTES # BLD AUTO: 3.1 10E9/L (ref 0.8–5.3)
LYMPHOCYTES NFR BLD AUTO: 23.5 %
MCH RBC QN AUTO: 27.5 PG (ref 26.5–33)
MCHC RBC AUTO-ENTMCNC: 32.7 G/DL (ref 31.5–36.5)
MCV RBC AUTO: 84 FL (ref 78–100)
MONOCYTES # BLD AUTO: 0.6 10E9/L (ref 0–1.3)
MONOCYTES NFR BLD AUTO: 4.5 %
NEUTROPHILS # BLD AUTO: 8.9 10E9/L (ref 1.6–8.3)
NEUTROPHILS NFR BLD AUTO: 68.3 %
NRBC # BLD AUTO: 0 10*3/UL
NRBC BLD AUTO-RTO: 0 /100
OPIATES UR QL SCN: NEGATIVE
PCP UR QL SCN: NEGATIVE
PLATELET # BLD AUTO: 403 10E9/L (ref 150–450)
POTASSIUM SERPL-SCNC: 4 MMOL/L (ref 3.4–5.3)
RBC # BLD AUTO: 4.95 10E12/L (ref 3.8–5.2)
SALICYLATES SERPL-MCNC: <2 MG/DL
SARS-COV-2 RNA RESP QL NAA+PROBE: NEGATIVE
SODIUM SERPL-SCNC: 140 MMOL/L (ref 133–144)
SPECIMEN SOURCE: NORMAL
WBC # BLD AUTO: 13.1 10E9/L (ref 4–11)

## 2021-05-19 PROCEDURE — 99285 EMERGENCY DEPT VISIT HI MDM: CPT | Mod: 25

## 2021-05-19 PROCEDURE — 80307 DRUG TEST PRSMV CHEM ANLYZR: CPT | Performed by: EMERGENCY MEDICINE

## 2021-05-19 PROCEDURE — 99213 OFFICE O/P EST LOW 20 MIN: CPT | Performed by: PSYCHIATRY & NEUROLOGY

## 2021-05-19 PROCEDURE — 87635 SARS-COV-2 COVID-19 AMP PRB: CPT | Performed by: EMERGENCY MEDICINE

## 2021-05-19 PROCEDURE — 82077 ASSAY SPEC XCP UR&BREATH IA: CPT | Performed by: EMERGENCY MEDICINE

## 2021-05-19 PROCEDURE — 93005 ELECTROCARDIOGRAM TRACING: CPT

## 2021-05-19 PROCEDURE — 80143 DRUG ASSAY ACETAMINOPHEN: CPT | Performed by: EMERGENCY MEDICINE

## 2021-05-19 PROCEDURE — 85025 COMPLETE CBC W/AUTO DIFF WBC: CPT | Performed by: EMERGENCY MEDICINE

## 2021-05-19 PROCEDURE — 80179 DRUG ASSAY SALICYLATE: CPT | Performed by: EMERGENCY MEDICINE

## 2021-05-19 PROCEDURE — 90791 PSYCH DIAGNOSTIC EVALUATION: CPT

## 2021-05-19 PROCEDURE — 80048 BASIC METABOLIC PNL TOTAL CA: CPT | Performed by: EMERGENCY MEDICINE

## 2021-05-19 PROCEDURE — 250N000013 HC RX MED GY IP 250 OP 250 PS 637: Performed by: EMERGENCY MEDICINE

## 2021-05-19 PROCEDURE — C9803 HOPD COVID-19 SPEC COLLECT: HCPCS

## 2021-05-19 RX ADMIN — NICOTINE POLACRILEX 2 MG: 2 GUM, CHEWING ORAL at 13:12

## 2021-05-19 ASSESSMENT — ACTIVITIES OF DAILY LIVING (ADL)
HYGIENE/GROOMING: INDEPENDENT
ORAL_HYGIENE: INDEPENDENT
DRESS: STREET CLOTHES

## 2021-05-19 ASSESSMENT — ENCOUNTER SYMPTOMS: HALLUCINATIONS: 0

## 2021-05-19 ASSESSMENT — MIFFLIN-ST. JEOR: SCORE: 1780.02

## 2021-05-19 NOTE — ED PROVIDER NOTES
ED Psychiatric EmPATH Note  [facility] Emergency Department - EmPATH Unit    Ayana Prasad MRN: [id number]   Age: 30 year old YOB: 1990     History     Chief Complaint   Patient presents with     Ingestion     HPI  Ayana Prasad is a 30 year old adult who goes by Randall who comes to the EmPATH due to concerns of him taking 10 tabs of clonazepam two days in a row.  He has it prescribed 1 mg once a day prn for anxiety.  He usually gets his medications in a daily container but instead he got the bottle of clonazepam.  He readily admits he took advantage of this and took more than he should have. He was not suicidal in doing this. He denies any suicidal thoughts. He has many services including living in a group home due to his TBI, borderline personality disorder, bipolar disorder, anxiety and polysubstance dependence. He would like to go back to the group home He was medically cleared in the ED prior to coming to the EmPATH.    Past Medical History  Past Medical History:   Diagnosis Date     ADHD (attention deficit hyperactivity disorder)      Bipolar 1 disorder      Borderline personality disorder      Cauda equina syndrome      Chronic low back pain      Depression      Depressive disorder      GERD (gastroesophageal reflux disease)      h/o TBI (traumatic brain injury)      Marginal corneal ulcer, left 7/17/2015     Nephrolithiasis      Polysubstance abuse - methamphetamine, hallucinagen, heroin, marijuana     currently in remission     PONV (postoperative nausea and vomiting)      PTSD (post-traumatic stress disorder)      Past Surgical History:   Procedure Laterality Date     BACK SURGERY - For Cauda Equina Syndrome  12/24/2016     COLONOSCOPY       COMBINED CYSTOSCOPY, INSERT STENT URETER(S) Left 8/30/2018    Procedure: COMBINED CYSTOSCOPY, INSERT STENT URETER(S);  Cystoscopy With Left Stent Placement;  Surgeon: Kiran Ulrich MD;  Location: [facility] OR     COMBINED CYSTOSCOPY, RETROGRADES,  EXCHANGE STENT URETER(S) Left 10/14/2018    Procedure: COMBINED CYSTOSCOPY, RETROGRADES, EXCHANGE STENT URETER(S);  Cystoscopy and removal of left-sided stent.;  Surgeon: Stiven Olivo MD;  Location:  OR     COMBINED CYSTOSCOPY, RETROGRADES, URETEROSCOPY, INSERT STENT  1/6/2014    Procedure: COMBINED CYSTOSCOPY, RETROGRADES, URETEROSCOPY, INSERT STENT;  Cystyoscopy place left ureteral stent;  Surgeon: Jaun Kimble MD;  Location: [facility] OR     COMBINED CYSTOSCOPY, RETROGRADES, URETEROSCOPY, INSERT STENT Left 10/23/2018    Procedure: Video cystoscopy, left ureteroscopy with stone extraction;  Surgeon: Bull Mast MD;  Location:  OR     CYSTOSCOPY, URETEROSCOPY, COMBINED Right 9/23/2015    Procedure: COMBINED CYSTOSCOPY, URETEROSCOPY;  Surgeon: ROME Jett MD;  Location: [facility] OR     ENT SURGERY       ESOPHAGOSCOPY, GASTROSCOPY, DUODENOSCOPY (EGD), COMBINED  4/8/2013    Procedure: COMBINED ESOPHAGOSCOPY, GASTROSCOPY, DUODENOSCOPY (EGD), BIOPSY SINGLE OR MULTIPLE;  Gastroscopy;  Surgeon: Peter Pickard MD;  Location: [facility] GI     ESOPHAGOSCOPY, GASTROSCOPY, DUODENOSCOPY (EGD), COMBINED Left 10/28/2014    Procedure: COMBINED ESOPHAGOSCOPY, GASTROSCOPY, DUODENOSCOPY (EGD), BIOPSY SINGLE OR MULTIPLE;  Surgeon: Narcisa Ramirez MD;  Location:  OR     ESOPHAGOSCOPY, GASTROSCOPY, DUODENOSCOPY (EGD), COMBINED N/A 12/24/2018    Procedure: COMBINED ESOPHAGOSCOPY, GASTROSCOPY, DUODENOSCOPY (EGD), BIOPSY SINGLE OR MULTIPLE;  Surgeon: Sonu Verduzco MD;  Location: [facility] GI     INJECT EPIDURAL TRANSFORAMINAL LUMBAR / SACRAL EA ADDITIONAL LEVEL Left 3/18/2021    Procedure: Left L5/S1 transforaminal injection for selective L5 nerve root block;  Surgeon: Eliza Pagan MD;  Location: [facility] [place] [place] OR     LAPAROSCOPIC CHOLECYSTECTOMY  11/20/2014    [facility], Dr. Ramirez     LASER HOLMIUM LITHOTRIPSY URETER(S), INSERT STENT, COMBINED  4/2/2014    Procedure: COMBINED CYSTOSCOPY, URETEROSCOPY, LASER HOLMIUM  "LITHOTRIPSY URETER(S), INSERT STENT;  Cystoscopy,Left Ureteral Stent Removal,Left Ureteroscopy with Laser Lithotripsy,Left Ureter Stent Placement;  Surgeon: ROME Jett MD;  Location: [facility] OR     Transurethral stone resection  03/11/2011     amphetamine-dextroamphetamine (ADDERALL) 10 MG tablet  clonazePAM (KLONOPIN) 1 MG tablet  escitalopram (LEXAPRO) 10 MG tablet  fluPHENAZine decanoate (PROLIXIN) 25 MG/ML injection  gabapentin (NEURONTIN) 300 MG capsule  lisdexamfetamine (VYVANSE) 50 MG capsule  lithium ER (LITHOBID) 300 MG CR tablet  needle, disp, 18G X 1\" MISC  Needle, Disp, 25G X 5/8\" MISC  OLANZapine (ZYPREXA) 2.5 MG tablet  omeprazole (PRILOSEC) 20 MG DR capsule  ondansetron (ZOFRAN ODT) 4 MG ODT tab  syringe, disposable, 1 ML MISC  testosterone cypionate (DEPOTESTOSTERONE) 200 MG/ML injection  zolpidem (AMBIEN) 5 MG tablet      Allergies   Allergen Reactions     Haldol [Haloperidol] Other (See Comments)     Makes patient very angry and anxious     Adhesive Tape Hives     Percocet [Oxycodone-Acetaminophen] Nausea and Vomiting     Prednisone Other (See Comments) and Hives     Suicidal ideation     Risperidone Other (See Comments)     Tramadol Hcl Nausea and Vomiting     Droperidol Anxiety     Seroquel [Quetiapine] Palpitations     Spent 2 weeks in the hospital due to having seroquel, caused palpitations and QT prolongation     Family History  Family History   Problem Relation Age of Onset     Gastrointestinal Disease Father         Crohn's Disease     Cancer Father         Liver Cancer     Other Cancer Father         Liver     Cancer Maternal Grandmother         lympoma     Diabetes Maternal Grandmother      Mental Illness Maternal Grandmother      Other Cancer Maternal Grandmother         Non Hodgkins Lymphoma     Diabetes Maternal Grandfather      Hypertension Maternal Grandfather      Prostate Cancer Maternal Grandfather      Hyperlipidemia Maternal Grandfather      Heart Disease Paternal Grandfather     " "    heart disease     Hypertension Brother      Other Cancer Brother         Esophagecial     Diabetes Brother      Hyperlipidemia Mother      Mental Illness Mother      Anxiety Disorder Mother      Anesthesia Reaction Mother         Vomiting/Nausea     Hypertension Brother      Other Cancer Brother      Other Cancer Paternal Half-Brother         Esophageal     No Known Problems Sister      Social History   Social History     Tobacco Use     Smoking status: Current Every Day Smoker     Packs/day: 1.00     Years: 5.00     Pack years: 5.00     Types: Dip, chew, snus or snuff, Other     Start date: 8/1/2011     Smokeless tobacco: Former User     Types: Chew     Quit date: 12/17/2019     Tobacco comment: vape   Substance Use Topics     Alcohol use: No     Alcohol/week: 0.0 standard drinks     Drug use: No     Types: Opiates     Comment: oxy, heroin (smoke)      Past medical history, past surgical history, medications, allergies, family history, and social history were reviewed with the patient. No additional pertinent items.       Review of Systems  A complete review of systems was performed with pertinent positives and negatives noted in the HPI, and all other systems negative.    Physical Examination   BP: 131/87  Pulse: 102  Temp: 98.7  F (37.1  C)  Resp: 16  Height: 167.6 cm (5' 6\")  Weight: 104.3 kg (230 lb)  SpO2: 97 %    Physical Exam  General:  Appears stated age.   Neuro: alert and fully oriented.  Grossly normal strength in all extremities.   Integumentary/Skin: no rash visualized, normal color    Psychiatric Examination   Appearance: awake, alert  Attitude:  cooperative  Eye Contact:  good  Mood:  good  Affect:  appropriate and in normal range  Speech:  clear, coherent  Psychomotor Behavior:  no evidence of tardive dyskinesia, dystonia, or tics  Throught Process:  logical, linear and goal oriented  Associations:  no loose associations  Thought Content:  no evidence of suicidal ideation or homicidal ideation " and no evidence of psychotic thought  Insight:  fair  Judgement:  fair  Oriented to:  time, person, and place  Attention Span and Concentration:  intact  Recent and Remote Memory:  intact    ED Course        Labs Ordered and Resulted from Time of ED Arrival Up to the Time of Departure from the ED   CBC WITH PLATELETS DIFFERENTIAL - Abnormal; Notable for the following components:       Result Value    WBC 13.1 (*)     Absolute Neutrophil 8.9 (*)     All other components within normal limits   BASIC METABOLIC PANEL - Abnormal; Notable for the following components:    Chloride 113 (*)     Carbon Dioxide 19 (*)     Glucose 101 (*)     All other components within normal limits   DRUG ABUSE SCREEN 77 URINE (FL, RH, SH) - Abnormal; Notable for the following components:    Amphetamine Qual Urine Positive (*)     All other components within normal limits   SARS-COV-2 (COVID-19) VIRUS RT-PCR   ACETAMINOPHEN LEVEL   SALICYLATE LEVEL   ALCOHOL ETHYL   DOCUMENT IN LEGAL HOLD NAVIGATOR       Assessments & Plan (with Medical Decision Making)   Patient presenting with taking advantage of getting clonazepam in the bottle and taking more than he should. Nursing notes reviewed.     I have reviewed the DEC assessment complete by Milad Luna dated 5/19/21.    Prakash will be discharged home. He is not an imminent risk to himself or others. He will follow up with his established providers.  Staff will take the klonopin away and will only give him the medications in the appropriate amount.      Preliminary diagnosis:  Borderline personality disorder  TBI  Drug abuse    --  Chandana Romero MD   [facility] EMERGENCY DEPT  EmPATH Unit  5/19/2021        Chandana Romero MD  05/19/21 [id number]

## 2021-05-19 NOTE — ED TRIAGE NOTES
Pt from a group home and reports they recently have natalia allowing klonopin in his possession since they are PRN and he has been taking roughly 10 a day to get high. - last taken yesterday.Pt denies that this was a suicide attempt. Pt is able to answer questions appropriately, friend at bedside, ABC's intact
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
## 2021-05-19 NOTE — ED NOTES
Affect remains irritable. Patient agrees to discharge plan. Discharge instructions reviewed with patient including follow-up care plan. Group Home staff was contacted. RN and LMHP spoke to Raphael to confirm that patient will not have access to medications upon return to the home. Per Raphael, they will make sure patient is supervised while taking his medications. Reviewed safety plan and outpatient resources. Denies SI and HI. All belongings that were brought into the hospital have been returned to patient. Escorted off the unit at 1830 accompanied by [facility] staff. Discharged to his Group Home via medical cab.

## 2021-05-19 NOTE — PROGRESS NOTES
Clinic Care Coordination Contact    Situation: CCC Referral received from provider.    Background: Patient currently on a Restricted Participant Program.    Assessment: CCC spoke with patient 4/22/21 regarding her Restricted Participant Program.  She has to call and speak with her Insurance Company.  She has been instructed to do so.  CCC is unable to assist with this.      Plan/Recommendations: No further CCC Outreach at this time.

## 2021-05-19 NOTE — ED NOTES
Writer spoke with  Vero [phone]. Vero wanted to let staff know that Randall is currently on a commitment through [facility]. Vero updated with plan.

## 2021-05-19 NOTE — CONSULTS
"This writer called and left message for patient's group home [facility] in [place] ([phone]) at 1715 hours.  Appears group home staff called back and RN is speaking to them.     This writer attempted to do safety plan with the patient for which he stated \" I dont want to do no fucking safety plan.  I got plenty of them.  I want to get out of here\".       Information was provided to care team regarding patient's refusal to complete safety plan.    "
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
## 2021-05-19 NOTE — ED PROVIDER NOTES
"  History   Chief Complaint:  Ingestion       HPI   Ayana \"Prakash\" VINCENT Prasad is a 30 year old male with history of ADHD, bipolar 1 disorder, borderline personality disorder, anxiety, depression with suicidal ideation, polysubstance abuse, gender identity disorder, and TBI who presents from a group home with ingestion of Klonopin. He has a prescription for 1 mg Klonopin PRN and took 10 mg yesterday and 10 mg the day before to get high. He denies active suicidal ideation or hallucinations. He denies drug or alcohol use. He sees his therapist Dr. Beckham every two weeks.    Per his friend Ashlyn who brought him in, Prakash has a history of polysubstance abuse but has been clean for months. He began using marijuana again recently.    Review of Systems   Psychiatric/Behavioral: Negative for hallucinations and suicidal ideas.   All other systems reviewed and are negative.        Allergies:  Haldol [Haloperidol]  Adhesive Tape  Percocet [Oxycodone-Acetaminophen]  Prednisone  Risperidone  Tramadol Hcl  Droperidol  Seroquel [Quetiapine]    Medications:  Adderall  Klonopin  Lexapro  Rpolixin  Gabapentin  Vynase  Lithium  Ambien  Zofran  Testosterone  Zyprexa   Prilosec    Past Medical History:    ADHD  Bipolar 1 disorder  Borderline personality disorder  Cauda equina syndrome  Chronic low back pain  Depression  GERD  TBI  Corneal ulcer, left  Nephrolithiasis  Polysubstance abuse - methamphetamine, hallucinogens, heroin, marijuana  PTSD  PONV  Dysfunctional uterine bleeding  Gender identity disorder  Anxiety  Obesity   Suicidal ideation  Left ovarian cyst     Past Surgical History:    Cystoscopy x5  EGD x3  Back surgery  lithotripsy  cholecystectomy    Family History:    Crohn's - father  Liver cancer - father     Social History:  Patient was brought in by his friend Ashlyn.  He lives in a group home.  He has a history of polysubstance abuse and has been clean for months, although he recently started using marijuana again.    Physical " "Exam     Patient Vitals for the past 24 hrs:   BP Temp Temp src Pulse Resp SpO2 Height Weight   05/19/21 1214 131/87 98.7  F (37.1  C) Tympanic 102 16 97 % 1.676 m (5' 6\") 104.3 kg (230 lb)       Physical Exam  General: Patient is alert and flat affect   HEENT: Head atraumatic    Eyes: pupils equal and reactive. Conjunctiva clear   Nares: patent   Oropharynx: no lesions, uvula midline, no palatal draping, normal voice, no trismus  Neck: Supple without lymphadenopathy, no meningismus  Chest: Tachycardic but regular.   Lungs: Equal clear to auscultation with no wheeze or rales  Abdomen: Soft, non tender, nondistended, normal bowel sounds  Back: No costovertebral angle tenderness, no midline C, T or L spine tenderness  Neuro: Grossly nonfocal, normal speech, strength equal bilaterally, CN 2-12 intact  Extremities: No deformities, equal radial and DP pulses. No clubbing, cyanosis.  No edema  Skin: Warm and dry with no rash.       Emergency Department Course   ECG  ECG taken at 1245, ECG read at 1252  Normal sinus rhythm. Normal ECG.   No significant changes as compared to prior, dated 1/22/2020.  Rate 98 bpm. WI interval 168 ms. QRS duration 86 ms. QT/QTc 350/446 ms. P-R-T axes 37 8 17.     Laboratory:  Asymptomatic COVID19 Virus PCR by nasopharyngeal swab: negative   Drug abuse screen 77 urine: positive for amphetamine, o/w all negative    Alcohol: <0.01  Salicylate: <2  Acetaminophen: <2    CBC: WBC 13.1(H), HGB 13.6,    BMP: chloride 113(H), carbon dioxide 19(L), glucose 101(H), o/w WNL (creatinine 0.81)    Emergency Department Course:    Reviewed:  I reviewed nursing notes, vitals, past medical history and care everywhere    Assessments:  1233 I obtained history and examined the patient as noted above.   1350 I rechecked the patient and explained findings.     Interventions:  1312 nicotine gum 2 mg buccal    Disposition:  The patient was transferred to [facility].       Impression & Plan   CMS Diagnoses: " "None    Medical Decision Making:  Patient is a 30-year-old transgender male who presents the emergency department with a friend having admitted to overdosing on his Klonopin over the last 2 days.  He states he is taking 10 mg each day in an effort to \"get high\".  He denies desire to harm himself or an intent to harm himself.  He denies auditory or visual hallucinations.  Patient consults for safety while here in the emergency department.  Medical screening work-up was undertaken as noted above.  Given that this medication was taken yesterday and he is hemodynamically stable with no new EKG changes I feel he is medically clear from the overdose standpoint.  There is no evidence of Tylenol or salicylate in his urine.  Patient was medically clear and was transferred to [facility] for mental health evaluation.  All patient questions and concerns addressed.    Covid-19  Ayana Prasad was evaluated during a global COVID-19 pandemic, which necessitated consideration that the patient might be at risk for infection with the SARS-CoV-2 virus that causes COVID-19.   Applicable protocols for evaluation were followed during the patient's care.   COVID-19 was considered as part of the patient's evaluation. The plan for testing is:  a test was obtained during this visit.    Diagnosis:    ICD-10-CM    1. Overdose, undetermined intent, initial encounter  T50.904A    2. Bipolar affective disorder, remission status unspecified (H)  F31.9        Scribe Disclosure:  WAYNE, Tracee Enrique, am serving as a scribe at 12:16 PM on 5/19/2021 to document services personally performed by Tran Larkin MD based on my observations and the provider's statements to me.              Tran Larkin MD  05/19/21 1703    "

## 2021-05-19 NOTE — ED NOTES
Pt given courtesy meal and juice. Pt continues to sit in  hallway, periodically pacing but appears calm and cooperative at this time.

## 2021-05-20 ENCOUNTER — TELEPHONE (OUTPATIENT)
Dept: FAMILY MEDICINE | Facility: CLINIC | Age: 31
End: 2021-05-20

## 2021-05-20 DIAGNOSIS — F64.9 GENDER DYSPHORIA: ICD-10-CM

## 2021-05-20 NOTE — TELEPHONE ENCOUNTER
"Assume the vial is wasted after 28 days?    I called and spoke to [facility], they confirm if a 10 ml multi-dose vial is sent it would be wasted after 4 doses, has to be disposed of in 28 days.   Only 0.8 mls would be used.  9.2 mls wasted each month.    Their preference is to continue the 1 ml single use vials, then only 0.8 mls are wasted each week.    If one month supply with refills is the goal, then send as \"4 mls\" with refills.   Controlled med, only can refill for 6 months per pharmacy.    I advised I'd naima up Rx that way and asked pharmacy to discontinue the 10 ml vial Rx sent on 5/18.    Routed to Becki Avery to address altered dispense for testosterone.    Edith Batista RN  [facility]          "

## 2021-05-20 NOTE — TELEPHONE ENCOUNTER
Reason for Call:  Other     Detailed comments: Pharmacist is questioning testosterone script received as disp 10 mil 0.2 mil once a week previously received 1 mil vial. Please call to clarify    Phone Number  [facility] [phone]         Best Time:     Can we leave a detailed message on this number? Not Applicable    Call taken on 5/20/2021 at 3:33 PM by Benita Shipman

## 2021-05-21 ENCOUNTER — TELEPHONE (OUTPATIENT)
Dept: PSYCHIATRY | Facility: CLINIC | Age: 31
End: 2021-05-21

## 2021-05-21 RX ORDER — TESTOSTERONE CYPIONATE 200 MG/ML
40 INJECTION, SOLUTION INTRAMUSCULAR WEEKLY
Qty: 4 ML | Refills: 0 | Status: SHIPPED | OUTPATIENT
Start: 2021-05-21 | End: 2021-06-15

## 2021-05-21 NOTE — TELEPHONE ENCOUNTER
I called pharmacy, they received the new Rx.   I advised them of the plan, gender care to take over filling this med in the future.    Edith Batista RN  [facility]

## 2021-05-21 NOTE — TELEPHONE ENCOUNTER
Prescription signed.  No refills given.  Gender care provider to take over prescribing once approved by restricted insurance program.    Becki CARVALHOC

## 2021-05-21 NOTE — TELEPHONE ENCOUNTER
PSYCHIATRY CLINIC PHONE INTAKE     SERVICES REQUESTED / INTERESTED IN          Other:  St[facility]    Presenting Problem and Brief History                              What would you like to be seen for? (brief description):  Diagnosed with psychosis in 2017. Hears voices. Take prolixin injection, been on it for a couple of years. Interested in med management, therapy, group, etc        Have you received a mental health diagnosis? Yes   Which one (s): ptsd, adhd, anxiety  Is there any history of developmental delay?  No   Are you currently seeing a mental health provider?  Yes            Who / month last seen:  Chapincito Ocampo through [employer], Birgit jimenez therapist through [employer]  Do you have mental health records elsewhere?  Yes (just [place] and [email or web address])  Will you sign a release so we can obtain them?  Yes    Have you ever been hospitalized for psychiatric reasons?  Yes  Describe:  2017, 2018, 2019 - haven and psychosis    Do you have current thoughts of self-harm?  No    Do you currently have thoughts of harming others?  No       Substance Use History     Do you have any history of alcohol / illicit drug use?  Yes  Describe:  Drug use - (overdosed yesterday on clonopin), meth use, quit last year  Have you ever received treatment for this?  Yes    Describe:  [facility]     Social History     Who is the patient's a guardian?  self    Name / number: self  Have you had an ACT team in last 12 months?  No  Describe: no   Do you have any current or past legal issues?  Yes  Describe: pt is on civil committment   OK to leave a detailed voicemail?  Yes    Medical/ Surgical History                                   Patient Active Problem List   Diagnosis     ADHD (attention deficit hyperactivity disorder)     Bipolar 1 disorder, manic, mild     Marijuana abuse     Polysubstance abuse (H)     GERD (gastroesophageal reflux disease)     Tobacco abuse     Abdominal pain, right upper quadrant      Intractable back pain     Optic neuritis     Cauda equina syndrome with neurogenic bladder (H)     Schizoaffective disorder, bipolar type (H)     PTSD (post-traumatic stress disorder)     Anxiety     Auditory hallucination     Class 2 obesity due to excess calories in adult     Nephrolithiasis     Cyst of left ovary     Borderline personality disorder (H)     Cannabis dependence (H)     Depression     Episodic mood disorder (H)     History of heroin abuse (H)     Moderate episode of recurrent major depressive disorder (H)     Opioid use disorder, severe, dependence (H)     Substance-induced psychotic disorder with hallucinations (H)     Nausea     Overdose     Suicidal ideation     Bella (H)     Spell of altered consciousness     Urinary retention     Obesity (BMI 35.0-39.9) with comorbidity (H)     Chronic bilateral low back pain without sciatica     AVA (generalized anxiety disorder)     Aggressive behavior     Gender identity disorder     Lumbosacral radiculopathy at L5     DUB (dysfunctional uterine bleeding)     Seizure-like activity (H)     Elevated blood pressure reading without diagnosis of hypertension          Medications             Current Outpatient Medications   Medication Sig Dispense Refill     amphetamine-dextroamphetamine (ADDERALL) 10 MG tablet Take 1 tablet daily in the afternoon. 30 tablet 0     clonazePAM (KLONOPIN) 1 MG tablet Take 1 tablet (1 mg) by mouth 2 times daily as needed for anxiety 30 tablet 0     escitalopram (LEXAPRO) 10 MG tablet TAKE 1 TABLET BY MOUTH DAILY 30 tablet 0     fluPHENAZine decanoate (PROLIXIN) 25 MG/ML injection INJECT 1ML (25MG) INTRAMUSCULARLY EVERY 14 DAYS 6 mL 0     gabapentin (NEURONTIN) 300 MG capsule TAKE 3 CAPSULES (900MG) AND TAKE 4 CAPSULES (1200MG ) BY MOUTH MIDDAY DAY AND TAKE 3 CAPSULES (900MG) BY MOUTH EVERY EVENING 300 capsule 11     lisdexamfetamine (VYVANSE) 50 MG capsule Take 1 capsule (50 mg) by mouth every morning 30 capsule 0     lithium ER  "(LITHOBID) 300 MG CR tablet Take one tab (1) each morning and three (3) tabs at bedtime 120 tablet 0     needle, disp, 18G X 1\" MISC Use for drawing up weekly testosterone dose 50 each 3     Needle, Disp, 25G X 5/8\" MISC Use for injecting weekly testosterone dose 50 each 3     OLANZapine (ZYPREXA) 2.5 MG tablet Take 2.5 mg by mouth 2 times daily as needed       omeprazole (PRILOSEC) 20 MG DR capsule TAKE 1 CAPSULE BY MOUTH DAILY 90 capsule 0     ondansetron (ZOFRAN ODT) 4 MG ODT tab Take 1 tablet (4 mg) by mouth every 8 hours as needed 10 tablet 0     syringe, disposable, 1 ML MISC Use for weekly testosterone dose 60 each 3     testosterone cypionate (DEPOTESTOSTERONE) 200 MG/ML injection Inject 0.2 mLs (40 mg) Subcutaneous once a week 4 mL 0     zolpidem (AMBIEN) 5 MG tablet Take 1 tablet (5 mg) by mouth At Bedtime 30 tablet 0         DISPOSITION      Phone screen with patient completed by  Luz Bowman. Routed to Edward Stringer and Larisa Bartlett for review.          "

## 2021-05-24 ENCOUNTER — OFFICE VISIT (OUTPATIENT)
Dept: FAMILY MEDICINE | Facility: CLINIC | Age: 31
End: 2021-05-24
Payer: COMMERCIAL

## 2021-05-24 ENCOUNTER — ANCILLARY PROCEDURE (OUTPATIENT)
Dept: GENERAL RADIOLOGY | Facility: CLINIC | Age: 31
End: 2021-05-24
Attending: PHYSICIAN ASSISTANT
Payer: COMMERCIAL

## 2021-05-24 VITALS
DIASTOLIC BLOOD PRESSURE: 86 MMHG | BODY MASS INDEX: 36.56 KG/M2 | WEIGHT: 227.5 LBS | HEIGHT: 66 IN | HEART RATE: 107 BPM | TEMPERATURE: 99 F | SYSTOLIC BLOOD PRESSURE: 130 MMHG | OXYGEN SATURATION: 97 % | RESPIRATION RATE: 18 BRPM

## 2021-05-24 DIAGNOSIS — M79.644 PAIN OF FINGER OF RIGHT HAND: Primary | ICD-10-CM

## 2021-05-24 DIAGNOSIS — S60.221A CONTUSION OF RIGHT HAND, INITIAL ENCOUNTER: ICD-10-CM

## 2021-05-24 PROCEDURE — 99214 OFFICE O/P EST MOD 30 MIN: CPT | Performed by: PHYSICIAN ASSISTANT

## 2021-05-24 PROCEDURE — 73140 X-RAY EXAM OF FINGER(S): CPT | Mod: RT | Performed by: RADIOLOGY

## 2021-05-24 ASSESSMENT — PAIN SCALES - GENERAL: PAINLEVEL: SEVERE PAIN (6)

## 2021-05-24 ASSESSMENT — MIFFLIN-ST. JEOR: SCORE: 1768.68

## 2021-05-24 NOTE — PATIENT INSTRUCTIONS
Good news, your x-ray does not show any signs of broken bones or dislocation.  Your symptoms are likely due to significant bruising/contusion.  For pain management please use Tylenol/ibuprofen, ice, and elevation.  You can use the provided wrap to ani tape your fourth and fifth fingers together.  Please discontinue ani taping once pain improves.  Your symptoms should improve over the next 1-2 weeks.  Return to clinic for any new or worsening symptoms.      Patient Education     Bruises (Contusions)  A bruise (contusion) happens when a blow to your body doesn't break the skin but does break blood vessels beneath the skin. Blood leaking from the broken vessels causes redness and swelling. As it heals, your bruise is likely to turn colors like purple, green, and yellow. This is normal. The bruise should fade in 2 or 3 weeks.   Things that make you more likely to bruise   Almost everyone bruises now and then. Certain people do bruise more easily than others. You're more prone to bruising as you get older. That's because blood vessels become more fragile with age. You're also more likely to bruise if you have a clotting disorder such as hemophilia or take medicines that reduce clotting, including aspirin, nonsteriodal anti-inflammatory (NSAIDs) and blood-thinning medicines. You are also more likely to bruise if you have liver disease or drink alcohol daily.   When to go to the emergency room (ER)  Bruises almost always heal on their own without special treatment. But for some people, a bad bruise can be serious. Seek medical care if you:     Have a clotting disorder such as hemophilia    Have cirrhosis or other serious liver disease    Take blood-thinning medicines such as heparin or warfarin  What to expect in the ER  A doctor will examine your bruise and ask about any health conditions you have. In some cases, you may have a test to check how well your blood clots. Other treatment will depend on your needs.    Follow-up care  Sometimes a bruise gets worse instead of better. It may become larger and more swollen. This can occur when your body walls off a small pool of blood under the skin (hematoma). In very rare cases, your doctor may need to drain extra blood from the area.   Tip:  Apply an ice pack or bag of frozen peas to a bruise for 15 to 20 minutes several times a day. Keep a thin cloth between the ice or frozen peas and your skin. The cold can help reduce redness and swelling.   [employer] last reviewed this educational content on 10/1/2019    6045-4281 [employer]. All rights reserved. This information is not intended as a substitute for professional medical care. Always follow your healthcare professional's instructions.

## 2021-05-24 NOTE — PROGRESS NOTES
"    Assessment & Plan     Contusion of right hand, initial encounter  Pain of finger of right hand  Patient is a 30-year-old male who presents to clinic due to swelling and pain at the right fifth finger after punching a door multiple x5 days ago.  Vital signs significant for tachycardia.  Physical exam significant for swelling of right fifth finger with ecchymosis present.  No deformity.  X-rays negative for fracture.  Symptoms likely due to contusion.  Discussed treatment with rest, ice, ani taping, and Tylenol/ibuprofen.  Return precautions provided.  - XR Finger Right G/E 2 Views     See Patient Instructions    Return in about 2 weeks (around 6/7/2021), or if symptoms worsen or fail to improve.    LILLIAN Jacobs [facility] SHAILESH Randall is a 30 year old who presents for the following health issues     HPI     Musculoskeletal problem/pain  Onset/Duration: 5 days, punched a door multiple times. Pain of right 5th digit only. No numbness or tingling. No other injuries.   Description  Location: pinky finger - right  Joint Swelling: YES  Redness: YES  Pain: YES  Warmth: no  Intensity:  moderate  Progression of Symptoms:  worsening  Accompanying signs and symptoms:   Fevers: no  Numbness/tingling/weakness: YES- weakness of finger  History  Trauma to the area: YES- He punched a door  Recent illness:  no  Previous similar problem: no  Previous evaluation:  no  Precipitating or alleviating factors:  Aggravating factors include: use of the hand  Therapies tried and outcome: nothing          Objective    /86   Pulse 107   Temp 99  F (37.2  C) (Tympanic)   Resp 18   Ht 1.676 m (5' 6\")   Wt 103.2 kg (227 lb 8 oz)   SpO2 97%   BMI 36.72 kg/m    Body mass index is 36.72 kg/m .  Physical Exam  Vitals signs and nursing note reviewed.   Constitutional:       General: He is not in acute distress.     Appearance: Normal appearance.   HENT:      Head: Normocephalic and atraumatic. "   Eyes:      Extraocular Movements: Extraocular movements intact.      Pupils: Pupils are equal, round, and reactive to light.   Neck:      Musculoskeletal: Normal range of motion.   Cardiovascular:      Rate and Rhythm: Normal rate.   Pulmonary:      Effort: Pulmonary effort is normal.   Musculoskeletal: Normal range of motion.      Comments: Normal range of motion of right elbow and wrist.  Tenderness to palpation over right fifth digit as well as fifth MCP joint.  No deformity.  Significant swelling and ecchymosis.  No erythema.  Normal range of motion of first-fourth digits Limited range of motion of fifth digit due to swelling.  Radial pulse 2+.   Skin:     General: Skin is warm and dry.   Neurological:      General: No focal deficit present.      Mental Status: He is alert.   Psychiatric:         Mood and Affect: Mood normal.         Behavior: Behavior normal.            Xray - Reviewed and interpreted by me.    XR finger right:  For mild rotation, no signs of dislocation or fracture

## 2021-05-26 ENCOUNTER — PATIENT OUTREACH (OUTPATIENT)
Dept: CARE COORDINATION | Facility: CLINIC | Age: 31
End: 2021-05-26

## 2021-05-27 ASSESSMENT — PATIENT HEALTH QUESTIONNAIRE - PHQ9: SUM OF ALL RESPONSES TO PHQ QUESTIONS 1-9: 2

## 2021-05-28 ENCOUNTER — OFFICE VISIT - HEALTHEAST (OUTPATIENT)
Dept: BEHAVIORAL HEALTH | Facility: CLINIC | Age: 31
End: 2021-05-28

## 2021-05-28 DIAGNOSIS — F41.1 GENERALIZED ANXIETY DISORDER: ICD-10-CM

## 2021-05-28 DIAGNOSIS — F25.9 SCHIZOAFFECTIVE DISORDER, CHRONIC CONDITION (H): ICD-10-CM

## 2021-05-28 ASSESSMENT — ANXIETY QUESTIONNAIRES: GAD7 TOTAL SCORE: 11

## 2021-05-28 NOTE — PROGRESS NOTES
CCC RN called and spoke with Ayana the [facility] Restricted Participant  for Prakash.  Provider having difficulty with paperwork that has been submitted before to [facility].  Writer confirmed that [facility] had an incorrect fax number for the provider and had been faxing and asking for updates to an old number.  The following 4 items are needed.     1.  Dr Juan is a 2nd year Resident.  Currently has Dr. Sundar Linton listed as okay'd to sign all prescriptions on Dr. Juan's behalf.  If Beckialexander Avery fills out a [facility] Referral stating that Dr. Juan is okay'd to write scripts for Prakash.  Then that will be approved.  Must include the address, NPI number etc.  I do not know what location Prakash attends.     2.  There was a form completed that was giving Prakash approval to see other providers in the [facility] in [facility].  It was supposed to list very specific providers by name and their NPI numbers.  This was left blank.   re-faxed this form to the correct fax number this morning.   Also able to locate this form on the [facility] web-site.     3.  Psychiatrist Dr. Negron's does not appear to have any direct affiliation with [facility].  This may be a hospital psychiatrist.  Need to know the NPI number and address for clinic the psychiatrist houses out of in order to have prescribing rights.   A High Priority Staff message was sent to Dr. Negron.  His clinic can also call Ayana with [facility] directly.  Her information is listed in the Care Team.    4.  Neurologist was left blank on a form.  Did you want Prakash to have a Neurologist listed?     Any questions regarding any information above, okay to call Ayana Bahena  with [facility].  Direct phone number (also in the Care Team)  [phone]

## 2021-06-11 ENCOUNTER — OFFICE VISIT - HEALTHEAST (OUTPATIENT)
Dept: BEHAVIORAL HEALTH | Facility: CLINIC | Age: 31
End: 2021-06-11

## 2021-06-11 DIAGNOSIS — F41.9 ANXIETY DISORDER, UNSPECIFIED TYPE: ICD-10-CM

## 2021-06-11 DIAGNOSIS — F25.9 SCHIZOAFFECTIVE DISORDER, CHRONIC CONDITION (H): ICD-10-CM

## 2021-06-14 ENCOUNTER — MYC MEDICAL ADVICE (OUTPATIENT)
Dept: FAMILY MEDICINE | Facility: CLINIC | Age: 31
End: 2021-06-14

## 2021-06-14 DIAGNOSIS — Z72.0 NICOTINE ABUSE: ICD-10-CM

## 2021-06-14 NOTE — TELEPHONE ENCOUNTER
Forms completed and placed in my out basket.     Becki JENKINS.      [facility] Nurse Triage Telephone Note      Provider: PATO Saldana  Facility: [facility]    Facility Type: TCU    Caller: Hesham  Call Back Number:  [phone]    Allergies: Patient has no known allergies.    Reason for call: Nurse reporting that earlier today, VS:  T=98.3, P=112, R=20, BP=93/57, O2=94%RA.  Patient was noted to be gagging on his secretions and coughed up sputum that was mostly clear, but also slightly blood tinged.  Repeat VS in the afternoon:  T=96.6, P=116, R=20, BP=89/56, O2=96%RA.  Nurse also states that patient was holding onto both ears and his nails were digging into his ears, causing them to bleed.  Nurse also states that his TF residual this afternoon was 115, so meds were given but feeding was held.  Notable meds:  Metoprolol 6.25mg two times a day.        Verbal Order/Direction given by Provider: Make sure patient is having bowel movements.  Dietician to eval and treat regarding elevated residual.  Check temperature Q 4 hours x 24 hours.  Cleanse ears with normal saline and apply bacitracin two times a day x 2 days, then leave FERNIE.  NP to see patient tomorrow.      Provider giving order: PATO Saldana    Verbal order given to: Angeline Ortiz RN

## 2021-06-15 ENCOUNTER — VIRTUAL VISIT (OUTPATIENT)
Dept: FAMILY MEDICINE | Facility: CLINIC | Age: 31
End: 2021-06-15
Payer: COMMERCIAL

## 2021-06-15 DIAGNOSIS — F64.9 GENDER DYSPHORIA: Primary | ICD-10-CM

## 2021-06-15 PROCEDURE — 99214 OFFICE O/P EST MOD 30 MIN: CPT | Mod: 95 | Performed by: STUDENT IN AN ORGANIZED HEALTH CARE EDUCATION/TRAINING PROGRAM

## 2021-06-15 RX ORDER — TESTOSTERONE CYPIONATE 200 MG/ML
40 INJECTION, SOLUTION INTRAMUSCULAR WEEKLY
Qty: 4 ML | Refills: 0 | Status: SHIPPED | OUTPATIENT
Start: 2021-06-15 | End: 2021-08-11

## 2021-06-15 NOTE — PROGRESS NOTES
Mental Health Psychotherapy Note    Patient: Ayana Prasad    : 1990 MRN: [id number]    2021    Start time: 4:04   Stop Time: 4:51   Session # Intake-1    Completed 2 point verification; patient verbally provided full name and date of birth.    Ayana also goes by [employer] but states either name is ok.       Session Type: Patient is presenting for an Individual session.    Ayana Prasad is a 30 y.o. adult is being seen today for    Chief Complaint   Patient presents with     Depression   .     Telemedicine Visit: The patient's condition can be safely assessed and treated via synchronous audio and visual telemedicine encounter.      Reason for Telemedicine Visit: Patient has requested telehealth visit and COVID restrictions    Originating Site (Patient Location): Patient's home    Distant Site (Provider Location): Provider Remote Setting    Consent:  The patient/guardian has verbally consented to: the potential risks and benefits of telemedicine (video visit) versus in person care; bill my insurance or make self-payment for services provided; and responsibility for payment of non-covered services.     Mode of Communication:  Video Conference via [employer],     As the provider I attest to compliance with applicable laws and regulations related to telemedicine.    Those present for this visit Ayana and this writer    Follow up in regards to ongoing symptom management of  Depression    New symptoms or complaints: Intake session    Functional Impairment:   Personal: 4  Family: 4  Social: 4  Work/School: 3    Clinical assessment of mental status:   Ayana Prasad presented on time.  She was oriented x3, open and cooperative, and dressed appropriately for this session and weather. Her memory is good with Normal cognitive functioning .  Her speech was  Delayed/Hesitant, Soft and somewhat slowed.  Language: Ayana had no problems expressing self and has a good vocabulary.  Concentration and focus is Within normal.  "Psychosis is noted or reported. She mood is Blunted and Depressed.  Affect is congruent with speech and is Congruent w/content of speech.  Fund of knowledge is adequate. Insight is adequate for therapy.    ASSESSMENT: Current Emotional / Mental Status (status of significant symptoms):              Risk status (Self / Other harm or suicidal ideation)              Patient {denies personal safety concerns               Patient denies current or recent suicidal ideation or behaviors.              Patient denies current or recent homicidal ideation or behaviors.              Patient denies current or recent self injurious behavior or ideation.              Patient denies other safety concerns.              Patient denies change in risk factors.                Patient denies change in protective factors              Recommended that patient call 911 or go to the local ED should there be a change in any of these risk factors.                Attitude:                                   Cooperative  Guarded               Orientation:                             x4              Speech                          Rate / Production:       Slow  Normal                           Volume:                       Normal               Mood:                                      Depressed               Thought Content:                    Hallucinations Not currently present              Thought Form:                        Coherent  Logical               Insight:                                     Good       Patient's impression of their current status: Ayana presented to intake stating she comes to therapy because she \"has issues with mental health. ADHD, trauma and hearing voices\". Ayana was open and cooperative through  The intake interview providing her history.  She reports she is in her first month of school to earn an undergrad degree in Music appreciation.  She has plans to move on to earn her Masters in Music therapy.  Ayana " "reports she has an undergraduate degree in Biology. Ayana shared she has started the process for \"gender transition\" at the [facility].  Ayana reports she has a long mental health history with 3 suicide attempts.  She is concerned ADHS will interfere with schools tasks.     Therapist impression of patients current state: This 30 y.o.  adult presents with depressed mood, diagnosis of schizoaffective disorder currently mild with no psychosis this date. Ayana does have a history of psychosis with hallucinations. Hears voice's that are directive nature. Ayana does have  History of suicidal ideation with 3 attempts. She is under commitment initiated by [facility] and reinstatesd by [facility].  A safety plan has been completed and is in Ayana [facility] file.  Ayana was not able to identify friends or family she would call.  Ayana shared she will go through Gender affirmation surgery. Ayana identified as lesbian rather than Transgender. She has not yet completed a psychological eval required prior to surgery.  [facility]  and CADI waiver worker are both  Included on her safety plan this writer will continue to help Ayana identify supports she can penny when needed.  Ayana lives in a group home with 4 other adults and 24 hour supervision. She has a mental health , Emily Rodriguez, and a CADI worker, Vero Bradford, both at [facility].     Therapist and patient contracted for therapy to include informed consent; risk and benefits of therapy, HIPAA, Limits of confidentiality, Mandated reporting and no show policy. Patient indicated verbally she understood and agreed to the consent for service.        Assessment tools used today include: CAGE-AID, C-SSRS and can be found documented in Doc Flowsheets.  Ayana will complete the AVA-7, PHQ-9, via [facility]      Type of psychotherapeutic technique provided: Insight oriented, Client centered and Motivational interviewing      Progress toward short term " goals:Intake session      Review of long term goals: Intake; DA and Treatment plan in process.      Diagnosis:   1. Schizoaffective disorder, chronic condition (H)     Intake (improving, worsening, no change)    Plan and Follow-up: Patient plans to continue taking the medication as prescribed. Patient plans to follow up with therapy in 2 weeks.  This writer will complete the diagnostic assessment.       Discharge Criteria/Planning: Client has chronic symptoms and ongoing therapy for maintenance stability recommended.      I have reviewed the note as documented above.  This accurately captures the substance of my conversation with the patient.    As the provider I attest to compliance with applicable laws and regulations related to telemedicine.  Performed and documented by  DESIRE Vanegas, LICSW   2/9/2021

## 2021-06-15 NOTE — PATIENT INSTRUCTIONS
Thank you for coming to our video visit today, we talked about your desire to increase your testosterone dosing.  We will have a testosterone level order labs for you for several months, I will have a order for testosterone lab faxed to your home nurse to be drawn on a Friday or Saturday (midcycle in your testosterone shots).  Once I get that number back either I or Dr. Juan (he has been managing your testosterone here) will reach out to you about a potential dose increase.  Expect to hear from us about 4 to 5 days after you get your blood drawn.  You know your self and your mental health at best, however given your chronic mental health symptoms I think that we would get the most benefit from advancing slowly with testosterone to minimize undesired side effects and make it as safely sustainable as possible.    As we talked about timeline for your expected for your desired changes are 3 to 6 months for darkening body hair, change in body mass fat redistribution can be 6 months to a year and drop in voice can be 6 months to a year.  If you would like we can also look into voice coaching and other resources to accentuate voice changes.  I will make a note to discuss that with you at your next follow-up.    We should see you in about a month after your dose changed to check in, at the very least every 3 months.    Thank you again, it was palmira to meet you.

## 2021-06-15 NOTE — PROGRESS NOTES
"Prakash is a 30 year old who is being evaluated via a billable video visit.      How would you like to obtain your AVS? MyChart  If the video visit is dropped, the invitation should be resent by: Send to e-mail at: [email or web address]  Will anyone else be joining your video visit? No    Video Start Time: 8:50 AM    Assessment & Plan     Gender dysphoria  Prakash is checking in on his testosterone, is interested in increasing his dose.  Our last T level of his is from January, we discussed the importance of rechecking his level prior to adjusting the dose.  He will have his home care nurse draw a level midcycle (Friday or Saturday) and we will then make contact over dose adjustment.  Regarding the testosterone he states he is noticing increased acne, but not anything to address his main areas of dysphoria (body hair, fat distribution, voice change).  We discussed timelines to be expected for any of these changes, however in future he may benefit from vocal coaching.  In the meantime he is reporting that his mental health is stable though not ideal, and that he feels he would gain significant benefit from increasing his testosterone dose.  Given how low his testosterone dose was at his last check, I think it may well be reasonable to minimally increase his dose to 0.3mLs.  I would still advance with caution given patient's comorbidities to make the treatment as safe and sustainable as possible.  - needle, disp, 18G X 1\" MISC; Use for drawing up weekly testosterone dose  - Needle, Disp, 25G X 5/8\" MISC; Use for injecting weekly testosterone dose  - syringe, disposable, 1 ML MISC; Use for weekly testosterone dose  - testosterone cypionate (DEPOTESTOSTERONE) 200 MG/ML injection; Inject 0.2 mLs (40 mg) Subcutaneous once a week  - Testosterone total; Future    See Patient Instructions    Return in about 4 weeks (around 7/13/2021).    Nory Grissom MD  [facility]    Davin Randall is " a 30 year old who presents for the following health issues    HPI     Desires:  + Body hair, change in body mass/fat distribution, and drop in voice  Only noticing the increase in acne, no change in mood or other findings  Shot days are Wednesdays    + Home nurse fax number: [phone]  Home nurse comes tomorrow, can come another day to do mid cycle labs.    + Mental health is stable, feels like his mood is actually relatively good.  Still having auditory hallucinations however they have not worsened or changed in starting testosterone per patient's report.  He has not yet gotten a new psychiatrist however he does have a stable access to his current psychiatrist, as well as therapist.    Review of Systems   See HPI      Objective           Vitals:  No vitals were obtained today due to virtual visit.    Physical Exam   GENERAL: Healthy, alert and no distress, mildly anxious sounding  EYES: Eyes grossly normal to inspection.    RESP: No audible wheeze, cough, or visible cyanosis  SKIN: Visible skin clear. No significant rash, abnormal pigmentation or lesions.  NEURO:  Mentation and speech appropriate for age.  PSYCH: Mentation appears normal, affect normal if slightly anxious judgement and insight intact, normal speech and appearance well-groomed.    No results found. However, due to the size of the patient record, not all encounters were searched. Please check Results Review for a complete set of results.    Video-Visit Details    Type of service:  Video Visit    Video End Time:9:06 AM    Originating Location (pt. Location): Home    Distant Location (provider location):  [facility]     Platform used for Video Visit: [employer]

## 2021-06-15 NOTE — PROGRESS NOTES
Mental Health Psychotherapy Note    Patient: Ayana Prasad    : 1990 MRN: [id number]    3/3/2021    Start time: 3:03   Stop Time: 3:51   Session # 2    Completed 2 point verification; patient verbally provided full name and date of birth.    Ayana also goes by [employer] but states either name is ok.       Session Type: Patient is presenting for an Individual session.    Ayana Prasad is a 30 y.o. adult is being seen today for    No chief complaint on file.  .     Telemedicine Visit: The patient's condition can be safely assessed and treated via synchronous audio and visual telemedicine encounter.      Reason for Telemedicine Visit: Patient has requested telehealth visit and COVID restrictions    Originating Site (Patient Location): Patient's home    Distant Site (Provider Location): Provider Remote Setting    Consent:  The patient/guardian has verbally consented to: the potential risks and benefits of telemedicine (video visit) versus in person care; bill my insurance or make self-payment for services provided; and responsibility for payment of non-covered services.     Mode of Communication:  Video Conference via [employer],     As the provider I attest to compliance with applicable laws and regulations related to telemedicine.    Those present for this visit Ayana and this writer    Follow up in regards to ongoing symptom management of  Depression    New symptoms or complaints: Intake session    Functional Impairment:   Personal: 4  Family: 4  Social: 4  Work/School: 3    Clinical assessment of mental status:   Ayana Prasad presented on time.  She was oriented x3, open and cooperative, and dressed appropriately for this session and weather. Her memory is good with Normal cognitive functioning .  Her speech was  Delayed/Hesitant, Soft and somewhat slowed.  Language: Ayana had no problems expressing self and has a good vocabulary.  Concentration and focus is Within normal. Psychosis is noted or reported.  She mood is Blunted and Depressed.  Affect is congruent with speech and is Congruent w/content of speech.  Fund of knowledge is adequate. Insight is adequate for therapy.    ASSESSMENT: Current Emotional / Mental Status (status of significant symptoms):   No change since session dated 2/24/2021              Risk status (Self / Other harm or suicidal ideation)              Patient {denies personal safety concerns               Patient denies current or recent suicidal ideation or behaviors.              Patient denies current or recent homicidal ideation or behaviors.              Patient denies current or recent self injurious behavior or ideation.              Patient denies other safety concerns.              Patient denies change in risk factors.                Patient denies change in protective factors              Recommended that patient call 911 or go to the local ED should there be a change in any of these risk factors.                Attitude:                                   Cooperative  Guarded               Orientation:                             x4              Speech                          Rate / Production:       Slow  Normal                           Volume:                       Normal               Mood:                                      Depressed               Thought Content:                    Hallucinations Not currently present              Thought Form:                        Coherent  Logical               Insight:                                     Good       Patient's impression of their current status: Ayana presents to therapy stating she is feeling stable. She discussed goal is to complete gender transition.  She states this provider will receive forms from the provider/clinic who will complete the surgery.  She states Surgeon Team will want to know her metal health is good.     Therapist impression of patients current state: discussed goals for therapy.  Ayana disclosed her  goal is to complete gender transition.  This provider explained to Ayana that is not something thiswriter can provider but I would help her get a referral to the correct clinic and provider. An evaluation to document stability   for transition surgery is out of scope for this provider and patient will need to be referred to the [facility].  This provider will determine any additional patient mental jn needs and treat or refer to the appropriate provider/clinic.     Assessment tools used today include: CAGE-AID, C-SSRS and can be found documented in Doc Flowsheets.  Ayana will complete the AVA-7, PHQ-9, via [employer]      Type of psychotherapeutic technique provided: Client centered and Motivational interviewing, Problem Solving      Progress toward short term goals: discussed goals    Review of long term goals: IIn process.      Diagnosis:   Schizoaffective disorder; chronic condition      Plan and Follow-up: Provider to determine appropriate level of service and refer as needed.     Discharge Criteria/Planning: Client has chronic symptoms and ongoing therapy for maintenance stability recommended.      I have reviewed the note as documented above.  This accurately captures the substance of my conversation with the patient.    As the provider I attest to compliance with applicable laws and regulations related to telemedicine.  Performed and documented by  DESIRE Vanegas, LICSW   3/3/2021

## 2021-06-15 NOTE — PROGRESS NOTES
"Step 1: Warning signs / cues (Thoughts, images, mood, situation, behavior) that a crisis may be developing: please check all that apply and/or add your own    Thoughts:   [] \"I don't matter\"   [x] \"People would be better off without me\"  [x] \"I'm a burden\"  [] \"I can't do this anymore\"  [] \"I just want this to end\"   [] \"Nothing makes it better\"  [] \"Other\"     Images:   [] Obsessive thoughts of death or dying:   [] Flashbacks   [] Visions of harm  [x] Other Voices are mean.  They tell me to kill myself.    Thinking Processes:   [] Ruminations (can't stop thinking about my problems):   [] Racing thoughts   [] Intrusive thoughts (bothersome, unwanted thoughts that come out of nowhere):   [x] Highly critical and negative thoughts:   [x] Disorganized thinking:   [] Paranoia:   [] Depersonalization  [x] Other Hears voices    Mood:  [x] Worsening depression  [x] Hopelessness  [] Helplessness  [] Intense anger  [] Intense worry  [] Agitation  [] Disinhibited (not caring about things or consequences)   [] Mood swings  [] Other     Behaviors:   [] Isolating/withdrawing   [x] Using drugs,   [] Using alcohol  [] Can't stop crying  [] Impulsive  [] Reckless behaviors (acting without thinking)  [] Giving things away  [] Saying good-bye  [] Aggression  [x] Not taking care of myself   [] Not taking care of my responsibilities  [] Sleeping too much  [] Not sleeping enough  [] Increasing frequency and duration of dissociation  [x] Other hearing voices directing me.  Mean voices.    Situations:   [] Bullying  [x] Loss  [] Public shame  [] Legal issues  [] Anniversary of  [] Changes in symptoms   [] Physical Pain   [x] Relationship problems  [x] Trauma   [x] Relapse of meth/Pot  [] Financial stress   [] Medical condition / diagnosis   [] Other    Step 2: Coping strategies  Things I can do to take my mind off of my problems without contacting another person    Distress Tolerance Strategies   [x] Relaxation activities  [] Arts and " crafts:   [] Play with my pet   [x] Listen to positive and upbeat music   [] Sensory based activities/self-soothe with five senses  [x] Watch a funny movie  [] [place]  [] Read a book  [] Change body temperature (ice pack/cold water)  [] Paced breathing/progressive muscle relaxation  [] Intense exercise for 2-3 minutes; repeat  [x] Other Talk to friends    Physical Activities:     [x] Go for a walk  [] Exercise  [] Yoga  [] Gardening  [] Meditation  [x] Deep breathing   [x] Stretching  []  Other     Focus on helpful thoughts:   List thoughts you can remind yourself of that will help you to be safe.  Im strong.  I'm a survivor    Step 3: People that provide distraction:    Name:  Group home residents and staff.   Phone:     Name:   Phone:     Name:   Phone:    Social settings that provide distraction  [x] Movie theater  [] Pet store/[employer]  [] Zoo  [x] Coffee shop  [] Park  [] Library  [] Volunteering  [] Community center  [] Gym  [] Christianity  [] Support group (i.e. twelve-step)  [x] School and/or work  [x]  Other Group home    Step 4: Remind myself of people that are important to me and worth living for, friends    Remind myself of things/pets/beliefs that are important to me and worth living for School for music therapy    Step 5: When I am in crisis, I can ask these people to help me use my safety plan:    Name: Emily Rodriguez. Mental health   Phone: [phone]    Name: Vero Bradford  Phone: [phone]    Name: Group Home residents and staff  Phone:    Step 6: Making the environment safe:    [] Remove alcohol  [x] Remove drugs  [x] Secure medications  [x] Dispose of old medications  [] Remove access to firearms  [] Remove things I could use to hurt myself  [x] Take alternate routes from my usual routine  [x]  Arrange transportation rather than drive myself  [x] Spend time with / be around others  [] Other     Step 7: Professionals or agencies I can contact during a crisis:    [x] [facility]  [facility] Number: [phone]  [x] Suicide Prevention Lifeline: 5-437-594-TALK (0127)  [x] Crisis Text Line Service (available 24 hours a day, 7 days a week):   [x] Text [place] to 745663   [x] Local Crisis Services   [x] Call 911 or go to my nearest emergency department.    I helped develop this safety plan and agree to use it when needed. I have been given a copy of this plan.    Client signature Video visit. Patient agreed via video therapy.   Today s date: 2/9/2021      Adapted from Safety Plan Template 2008 Vane Rivers and Eron Jaimes is reprinted with the express permission of the authors. No portion of the Safety Plan Template may be reproduced without the express, written permission. You can contact the authors at bhs@[email or web address].[email or web address] or [email or web address].

## 2021-06-15 NOTE — PROGRESS NOTES
Preceptor Attestation:   Patient seen via video, evaluated and discussed with the resident. I have verified the content of the note, which accurately reflects my assessment of the patient and the plan of care.   Supervising Physician:  Mohinder Sin MD

## 2021-06-16 NOTE — PROGRESS NOTES
Mental Health Psychotherapy Note    Patient: Ayana Prasad    : 1990 MRN: [id number]    3/31/2021    Start time: 2:03   Stop Time: 2:51   Session # 3    Completed 2 point verification; patient verbally provided full name and date of birth.    Ayana also goes by [employer] but states either name is ok.       Session Type: Patient is presenting for an Individual session.    Ayana Prasad is a 30 y.o. adult is being seen today for    Chief Complaint   Patient presents with     Anxiety   .     Telemedicine Visit: The patient's condition can be safely assessed and treated via synchronous audio and visual telemedicine encounter.      Reason for Telemedicine Visit: Patient has requested telehealth visit and COVID restrictions    Originating Site (Patient Location): Patient's home    Distant Site (Provider Location): Provider Remote Setting    Consent:  The patient/guardian has verbally consented to: the potential risks and benefits of telemedicine (video visit) versus in person care; bill my insurance or make self-payment for services provided; and responsibility for payment of non-covered services.     Mode of Communication:  Video Conference via [employer],     As the provider I attest to compliance with applicable laws and regulations related to telemedicine.    Those present for this visit Ayana and this writer    Follow up in regards to ongoing symptom management of  Depression    New symptoms or complaints: Intake session    Functional Impairment:   Personal: 4  Family: 4  Social: 4  Work/School: 3    Clinical assessment of mental status:   Ayana Prasad presented on time.  She was oriented x3, open and cooperative, and dressed appropriately for this session and weather. Her memory is good with Normal cognitive functioning .  Her speech was  Delayed/Hesitant, Soft and somewhat slowed.  Language: Ayana had no problems expressing self and has a good vocabulary.  Concentration and focus is Within normal.  Psychosis is noted or reported. She mood is Blunted and Depressed.  Affect is congruent with speech and is Congruent w/content of speech.  Fund of knowledge is adequate. Insight is adequate for therapy.    ASSESSMENT: Current Emotional / Mental Status (status of significant symptoms):   No change since session dated 2/24/2021              Risk status (Self / Other harm or suicidal ideation)              Patient {denies personal safety concerns               Patient denies current or recent suicidal ideation or behaviors.              Patient denies current or recent homicidal ideation or behaviors.              Patient denies current or recent self injurious behavior or ideation.              Patient denies other safety concerns.              Patient denies change in risk factors.                Patient denies change in protective factors              Recommended that patient call 911 or go to the local ED should there be a change in any of these risk factors.                Attitude:                                   Cooperative  Guarded               Orientation:                             x4              Speech                          Rate / Production:       Slow  Normal                           Volume:                       Normal               Mood:                                      Depressed anxious              Thought Content:                    Hallucinations Not currently present              Thought Form:                        Coherent  Logical               Insight:                                     Good       Patient's impression of their current status: Ayana presents to therapy stating she is feeling stable. Ayana is being seen at the [facility]. She has seen a psychologist for evaluation to assess stability for transition surgery. Ayana states she is coming to therapy because  She dumps a lot on her friends. Has panic attacks beginning in the last year. Most of the panic  "attacks occur in the evening. Hears voices constantly and when stressed the voices increase. Ayana shared Voices are mean, call her names and tell her to hurt herself.  Ayana states she can talk back to the voices, telling them to \"F\" off.  She states they don't listen. Ayana wants to adopt a dog as an emotional support .     Therapist impression of patients current state: Ayana, also known as Prakash comes to therapy reporting anxiety with panic.  Schizoaffective disorder may be exacebating panic. Discussed Mindfulness with Ayana. Flat affect.      Assessment tools used today include: CAGE-AID, C-SSRS and can be found documented in Doc Flowsheets.  Ayana will complete the AVA-7, PHQ-9, via [employer]    Type of psychotherapeutic technique provided: Client centered and Motivational interviewing, Problem Solving    Progress toward short term goals: discussed goals    Review of long term goals: IIn process.      Diagnosis:   Schizoaffective disorder; chronic condition, Panic disorder    Plan and Follow-up: Patient will follow up with psychologist at [facility] as needed. She will Consider therapy goals.  We will continue to  Discuss and practice Mindfulness in session.     Discharge Criteria/Planning: Client has chronic symptoms and ongoing therapy for maintenance stability recommended.    I have reviewed the note as documented above.  This accurately captures the substance of my conversation with the patient.    As the provider I attest to compliance with applicable laws and regulations related to telemedicine.  Performed and documented by  DESIRE Vanegas, LICSW  3/31/2021  "

## 2021-06-16 NOTE — PROGRESS NOTES
Mental Health Psychotherapy Note    Patient: Ayana Prasad (Prakash)   : 1990 MRN: [id number]    2021    Start time: 2:04   Stop Time: 2:29   (25 min) Session # 4    Completed 2 point verification; patient verbally provided full name and date of birth.    Ayana also goes by Prakash but states either name is ok.       Session Type: Patient is presenting for an Individual session.    Ayana Prasad is a 30 y.o. adult is being seen today for    Chief Complaint   Patient presents with     Anxiety     Depression     schizoaffective, bipolar type   .     Telemedicine Visit: The patient's condition can be safely assessed and treated via synchronous audio and visual telemedicine encounter.      Reason for Telemedicine Visit: Patient has requested telehealth visit and COVID restrictions    Originating Site (Patient Location): Patient's home    Distant Site (Provider Location): Provider Remote Setting    Consent:  The patient/guardian has verbally consented to: the potential risks and benefits of telemedicine (video visit) versus in person care; bill my insurance or make self-payment for services provided; and responsibility for payment of non-covered services.     Mode of Communication:  Video Conference via [employer],     As the provider I attest to compliance with applicable laws and regulations related to telemedicine.    Those present for this visit Ayana and this writer    Follow up in regards to ongoing symptom management of  Depression    New symptoms or complaints: Intake session    Functional Impairment:   Personal: 4  Family: 4  Social: 4  Work/School: 3    Clinical assessment of mental status:   Ayana Prasad presented on time.  She was oriented x3, open and cooperative, and dressed appropriately for this session and weather. Her memory is good with Normal cognitive functioning .  Her speech was  Delayed/Hesitant, Soft and somewhat slowed.  Language: Ayana had no problems expressing self and has a  good vocabulary.  Concentration and focus is Within normal. Psychosis is noted or reported. She mood is Blunted and Depressed.  Affect is congruent with speech and is Congruent w/content of speech.  Fund of knowledge is adequate. Insight is adequate for therapy.    ASSESSMENT: Current Emotional / Mental Status (status of significant symptoms):   No change since session dated 2/24/2021              Risk status (Self / Other harm or suicidal ideation)              Patient {denies personal safety concerns               Patient denies current or recent suicidal ideation or behaviors.              Patient denies current or recent homicidal ideation or behaviors.              Patient denies current or recent self injurious behavior or ideation.              Patient denies other safety concerns.              Patient denies change in risk factors.                Patient denies change in protective factors              Recommended that patient call 911 or go to the local ED should there be a change in any of these risk factors.                Attitude:                                   Cooperative  Guarded               Orientation:                             x4              Speech                          Rate / Production:       Slow  Normal                           Volume:                       Normal               Mood:                                      Depressed anxious              Thought Content:                    Hallucinations Not currently present              Thought Form:                        Coherent  Logical               Insight:                                     Good       Patient's impression of their current status: Ayana presents to therapy stating she is feeling stable. Prakash is being seen at the [facility]. She has seen a psychologist for evaluation to assess stability for transition surgery.  She insists she is doing well and denies psychotic symptoms.      Therapist  impression of patients current state: Prakash, comes to therapy reporting anxiety with panic. Schizoaffective disorder may be exacebating panic. Discussed Mindfulness with Ayana.   Flat affect. Prakash is almost non responsive. She is not interactive.  She denies psychosis but is observed putting her hands over her ears and keeping them there most of session. She puts her head face down on her desk. She is fatigued and irritable. Session ended after early with Ayana's inability to participate. A review of her medical record reveals she has started HRT (masculine therapies). She is seeing a psychiatrist today at [facility]. This writer does not have a release to talk with her Psychiatrist Jim Corbett, [facility] ([phone]). Patient gives verbal consent for this writer to talk with  to discuss current symptoms and therapy. In patient best interest and best practice, this provider contact  to provide info on observable symptoms.  Patient was encouraged to discuss current symptoms and HRT with  as well as with her psychologist Dora Mazariegos, PhD. (email: [email or web address] ).  Jan docuementation indicates importance of Prakash communicating mood issues.  Diagnostic assessment completed at the [facility] By Dr. Mazariegos.     Assessment tools used today include: CAGE-AID,   C-SSRS and can be found documented in Doc Flowsheets.  Ayana will complete the AVA-7, PHQ-9, via [employer].    Type of psychotherapeutic technique provided: Client centered and Motivational interviewing, Problem Solving. Collaboration with care team at [facility] and [facility]    Progress toward short term goals: discussed goals    Review of long term goals: IIn process.      Diagnosis:   Schizoaffective disorder; chronic condition, Panic disorder    Plan and Follow-up: Patient will follow up with psychologist at [facility] as needed. She will Consider therapy goals.  We will continue to  Discuss and practice Mindfulness in session. Prakash will  discuss current MH symptoms with Psychiatrist and Psychology at [facility].  This writer to collaborate with care team.     Discharge Criteria/Planning: Client has chronic symptoms and ongoing therapy for maintenance stability recommended.    I have reviewed the note as documented above.  This accurately captures the substance of my conversation with the patient.    As the provider I attest to compliance with applicable laws and regulations related to telemedicine.  Performed and documented by  DESIRE Vanegas, LICSW  4/21/2021

## 2021-06-17 NOTE — TELEPHONE ENCOUNTER
Routing refill request to provider for review/approval because:  Drug not active on patient's medication list.  Jeanna Sidhu RN  [facility]

## 2021-06-17 NOTE — TELEPHONE ENCOUNTER
FUTURE VISIT INFORMATION      FUTURE VISIT INFORMATION:    Date: 9/14/21    Time: 1:00pm    Location: [facility]  REFERRAL INFORMATION:    Referring provider:  self    Referring providers clinic:  N/A    Reason for visit/diagnosis  Top consult    RECORDS REQUESTED FROM:       No recs to collect

## 2021-06-17 NOTE — PROGRESS NOTES
Mental Health Psychotherapy Note    Patient: Ayana Prasad (Prakash)   : 1990 MRN: [id number]    2021    Start time: 5:03   Stop Time: 5:35   Session # 5    Completed 2 point verification; patient verbally provided full name and date of birth.    Ayana also goes by Prakash but states either name is ok.       Session Type: Patient is presenting for an Individual session.    Ayana Prasad is a 30 y.o. adult is being seen today for    Chief Complaint   Patient presents with     Anxiety   .     Telemedicine Visit: The patient's condition can be safely assessed and treated via synchronous audio and visual telemedicine encounter.      Reason for Telemedicine Visit: Patient has requested telehealth visit and COVID restrictions    Originating Site (Patient Location): Patient's home    Distant Site (Provider Location): Provider Remote Setting    Consent:  The patient/guardian has verbally consented to: the potential risks and benefits of telemedicine (video visit) versus in person care; bill my insurance or make self-payment for services provided; and responsibility for payment of non-covered services.     Mode of Communication:  Video Conference via [employer],     As the provider I attest to compliance with applicable laws and regulations related to telemedicine.    Those present for this visit Ayana and this writer    Follow up in regards to ongoing symptom management of  Depression    New symptoms or complaints: Intake session    Functional Impairment:   Personal: 4  Family: 4  Social: 4  Work/School: 3    Clinical assessment of mental status:   Ayana Prasad presented on time.  She was oriented x3, open and cooperative, and dressed appropriately for this session and weather. Her memory is good with Normal cognitive functioning .  Her speech was  Delayed/Hesitant, Soft and somewhat slowed.  Language: Ayana had no problems expressing self and has a good vocabulary.  Concentration and focus is Within normal.  "Psychosis is noted or reported. She mood is Blunted and Depressed.  Affect is congruent with speech and is Congruent w/content of speech.  Fund of knowledge is adequate. Insight is adequate for therapy.    ASSESSMENT: Current Emotional / Mental Status (status of significant symptoms):   No change since session dated 2/24/2021              Risk status (Self / Other harm or suicidal ideation)              Patient {denies personal safety concerns               Patient denies current or recent suicidal ideation or behaviors.              Patient denies current or recent homicidal ideation or behaviors.              Patient denies current or recent self injurious behavior or ideation.              Patient denies other safety concerns.              Patient denies change in risk factors.                Patient denies change in protective factors              Recommended that patient call 911 or go to the local ED should there be a change in any of these risk factors.                Attitude:                                   Cooperative  Guarded               Orientation:                             x4              Speech                          Rate / Production:       Slow  Normal                           Volume:                       Normal               Mood:                                      Depressed anxious              Thought Content:                    Hallucinations Not currently present              Thought Form:                        Coherent  Logical               Insight:                                     Good       Patient's impression of their current status: Ayana presented reporting she was \"ok\". She states she is taking medication as directed.  She acknowledged little change in symptoms of depression and anxiety. Ayana shared her meds are managed by her primary MD.  She states her psychiatrist is unable to prescribe under medical assistance. Ayana states she needs a new psychiatrist.  She states " she has taking 5 HRT shots and will take them the rest of her life.  Ayana has not talked to her providers at the [facility]. Ayana acknowlwedge hearing voices. She shared voices are louder and distracting.  Ayana disclosed the voices tell her not to talk to this writer.  Ayana states the voices make it hard for her to participate in anything or finish a project. Tammy shared it's getting harder to ignore the voices.  Ayana shared she is has a [facility]  for Civil Commitment which has been renewed twice. Ayana gives this writer verbal permission to speak to her worker Senia.     Therapist impression of patients current state: Prakash, comes to therapy reporting depressed mood and anxiety with panic. Schizoaffective disorder may be exacebating panic. Discussed Mindfulness with Ayana.   Flat affect. Prakash continues to struggle to be responsive to this provider.  She covers her ears with her hands, drops her head back closing her eyes tightly. She lays her head on her desk. At times provider needs to call her name to get her attention.  An email has been sent to her psychologist at [facility] department.     Assessment tools used today include: CAGE-AID,   C-SSRS and can be found documented in Doc Flowsheets.  Ayana will complete the AAV-7, PHQ-9, via [employer].    Type of psychotherapeutic technique provided: Client centered and Motivational interviewing, Problem Solving. Collaboration with care team at [facility] and [facility]    Progress toward short term goals: discussed goals    Review of long term goals: In process.      Diagnosis:   Schizoaffective disorder; chronic condition, Panic disorder    Plan and Follow-up: Patient will follow up with psychologist at [facility] as needed. She will Consider therapy goals.  We will continue to  Discuss and practice Mindfulness in session. Prakash will follow up with [facility] department and discuss current MH symptoms with Psychiatrist and Psychology at [facility].  Ayana was  encouraged to contact Intake for psychiatry appointment.  This writer to collaborate with care team.     Discharge Criteria/Planning: Client has chronic symptoms and ongoing therapy for maintenance stability recommended.    I have reviewed the note as documented above.  This accurately captures the substance of my conversation with the patient.    As the provider I attest to compliance with applicable laws and regulations related to telemedicine.  Performed and documented by  DESIRE Vanegas, [facility] Center[facility]  4/27/2021

## 2021-06-17 NOTE — PROGRESS NOTES
Mental Health Psychotherapy Note    Patient: Ayana Prasad (Prakash)   : 1990 MRN: [id number]    2021    Start time: 1:05   Stop Time: 1:55   Session # 6    Compled 2 point verification; patient verbally provided full name and date of birth.    Ayana also goes by Prakash but states either name is ok.       Session Type: Patient is presenting for an Individual session.    Ayana Prasad is a 30 y.o. adult is being seen today for    Chief Complaint   Patient presents with     Depression     Anxiety   .     Telemedicine Visit: The patient's condition can be safely assessed and treated via synchronous audio and visual telemedicine encounter.      Reason for Telemedicine Visit: Patient has requested telehealth visit and COVID restrictions    Originating Site (Patient Location): Patient's home    Distant Site (Provider Location): Provider Remote Setting    Consent:  The patient/guardian has verbally consented to: the potential risks and benefits of telemedicine (video visit) versus in person care; bill my insurance or make self-payment for services provided; and responsibility for payment of non-covered services.     Mode of Communication:  Video Conference via [email or web address],     As the provider I attest to compliance with applicable laws and regulations related to telemedicine.    Those present for this visit Ayana and this writer    Follow up in regards to ongoing symptom management of  Depression    New symptoms or complaints: None reported this session    Functional Impairment:   Personal: 4  Family: 4  Social: 4  Work/School: 3    Clinical assessment of mental status:   Ayana Randall as he prefers to be called presented on time.  He was oriented x3, open and cooperative, and dressed appropriately for this session and weather. His memory is good with Normal cognitive functioning .  His speech was  Delayed/Hesitant, Soft and somewhat slowed.  Language: Prakash had no problems expressing self and has a  "good vocabulary.  Concentration and focus is Within normal. Psychosis is noted or reported. He mood is Blunted and Depressed.  Affect is congruent with speech and is Congruent w/content of speech.  Fund of knowledge is adequate. Insight is adequate for therapy.    ASSESSMENT: Current Emotional / Mental Status (status of significant symptoms):   No change since session dated 5/4/2021              Risk status (Self / Other harm or suicidal ideation)              Patient {denies personal safety concerns               Patient denies current or recent suicidal ideation or behaviors.              Patient denies current or recent homicidal ideation or behaviors.              Patient denies current or recent self injurious behavior or ideation.              Patient denies other safety concerns.              Patient denies change in risk factors.                Patient denies change in protective factors              Recommended that patient call 911 or go to the local ED should there be a change in any of these risk factors.                Attitude:                                   Cooperative  Guarded               Orientation:                             x4              Speech                          Rate / Production:       Slow  Normal                           Volume:                       Normal               Mood:                                      Depressed anxious              Thought Content:                    Hallucinations Not currently present              Thought Form:                        Coherent  Logical               Insight:                                     Good       Patient's impression of their current status: Prakash presents reporting he is doing \"good\".   No psychosis this session.  He states he has days when \"the voices are bad\".  He states when he used THC he does not hear voices.  He shared a history of THC use and legal problems due to untreated mental illness. Randall states he is " planning to move a supportive living apartment. Prakash states he will continue college classes through summer. He shared he has 1 year till he graduates with a masters in music. He reports he is down to >= 3 mg nicotine which is down from < 13 mg nicotine. He states he is happy to have cut back to <3 but has no intention of going to 0 nicotine or stopping use of the vape pipe.    Therapist impression of patients current state: Prakash, comes to therapy reporting stable mood with mild anxiety.  No panic. Schizoaffective disorder stable. Prakash states he is using mindfulness when se thinks of it.  Is brighter, more animated and expressive than he has been the last 2 sessions.    He is looking forward to moving a supportive apartment.  He is determined he will be as independent as possible.  His pet dog stays with him as an emotional .  He states he plays and walks his dog daily. Prakash shared happy memories of his/her childhood which he appeared to enjoy, no anxiety. Radha will not discuss cutting out vaping all together but is willing to cut nicotine content down further then his current <=3. Ayana darby continues therapy to gain support through daily function and to maintain stability of mood. He is currently going through Sex reassignment with HRT and increased support with ongoing evaluation of function is indicated. DA completed by Dr. Mazariegos, Ph.D. [facility].    Assessment tools used today include: CAGE-AID, C-SSRS and can be found documented in Doc Flowsheets.    Prakash will complete the AVA-7, PHQ-9, via [employer].    Type of psychotherapeutic technique provided: Client centered and Motivational interviewing, Problem Solving. Collaboration with care team at [facility] and [facility]    Progress toward: Using mindfulness. Follows with [facility] providers. Follows with PCP for medication management.    Review of long term goals: Completed with Prakash this session.  Review on  8/2021    Diagnosis:   Schizoaffective disorder; chronic condition, Panic disorder    Plan and Follow-up: Patient will follow up with psychologist at [facility] as needed. We will continue to  Discuss and practice Mindfulness in session. Praaksh will follow up with [facility] department and inform the team of any inrcease in symptoms or new symptoms . Prakash was encouraged to discuss referral to psychiatry with [facility] team.  This writer to collaborate with care team.     Discharge Criteria/Planning: Client has chronic symptoms and ongoing therapy for maintenance stability recommended.    I have reviewed the note as documented above.  This accurately captures the substance of my conversation with the patient.    As the provider I attest to compliance with applicable laws and regulations related to telemedicine.  Performed and documented by  DESIRE Vanegas, LICSW  5/14/2021

## 2021-06-21 ENCOUNTER — TELEPHONE (OUTPATIENT)
Dept: FAMILY MEDICINE | Facility: CLINIC | Age: 31
End: 2021-06-21

## 2021-06-21 NOTE — TELEPHONE ENCOUNTER
FW: Restricted Participant Program  Received: 3 weeks ago  Message Contents   Becki Avery APRN CNP  P Be  Pool          Previous Messages    ----- Message -----   From: Jodi Kenyon RN   Sent: 5/26/2021   1:10 PM CDT   To: BROOKLYN Ford CNP   Subject: RE: Restricted Participant Program               I will call the  back with your correct fax number.  They do have the correct NPI number for Dr. Negron however they are saying he does not have an [facility]?  Not sure.  I will let you know if I hear anything differently.  I might try a staff message to Dr Negron's clinic to locate the NPI number for his clinic.   I can CC you if you would like.  If it's too much communication back and forth; I can just update you afterwards to let you know what I find out.   ----- Message -----   From: Becki Avery APRN CNP   Sent: 5/26/2021   1:05 PM CDT   To: Jodi Kenyon RN   Subject: RE: Restricted Participant Program               That maybe the old [place] location. The current one is [phone].     We have previously filled out the managed care referral form for  NPI #[id number]-lryrydr is [address] [place] [address] and still they have not been approved.  This form was completed on August 12, 2020.  This has been an ongoing problem since then.     Becki Avery NP-C   ----- Message -----   From: Jodi Kenyon RN   Sent: 5/26/2021  12:58 PM CDT   To: BROOKLYN Ford CNP   Subject: RE: Restricted Participant Program               The fax number attached to your name is [phone].  Is there a different one?   ----- Message -----   From: Becki Avery APRN CNP   Sent: 5/26/2021  12:53 PM CDT   To: Jodi Kenyon RN   Subject: RE: Restricted Participant Program               We have not received any faxes.  Are they faxing it to the [facility] [place] [place] and not [place].     Becki Avery  NP-C   ----- Message -----   From: Jodi Kenyon RN   Sent: 5/26/2021  10:19 AM CDT   To: BROOKLYN Ford CNP   Subject: RE: Restricted Participant Program               I had a very lengthy telephone conversation with Prakash's Restricted Participant CM.  I will also put her name and direct phone number in the care team.  I will also put in a telephone encounter to reflect what we spoke about.  Sounds like there are 4 separate issues going on.     1.  Dr Juan is a 2nd year Resident.  Currently has Dr. Sundar Linton listed as okay'd to sign all prescriptions on Dr. Juan's behalf.  If you fill out a  Care Referral stating that Dr. Juan is okay'd to write scripts for Prakash.  Then that will be approved.  Must include the address, NPI number etc.  NPI: [id number].  I do not know what location Prakash attends.     2.  There was a form completed that was giving Prakash approval to see other providers in your clinic in your absence.  It was supposed to list very specific providers by name and there NPI numbers.  This was left blank.  Sounds like the  re-faxed the form to have this corrected.  She faxed it again this morning.  Also able to locate this form on the [facility] web-site.     3.  Psychiatrist Dr. Negron's does not appear to have any direct affiliation with [facility].  This may be a hospital psychiatrist.  Need to know the NPI number and address for clinic the psychiatrist houses out of in order to have prescribing rights.     4.  Neurologist was left blank on a form.  Did you want Prakash to have a Neurologist listed?     Any questions regarding above, okay to call Ayana Josef  with [facility].  Direct phone number (also in the Care Team)  [phone]   ----- Message -----   From: Becki Avery APRN CNP   Sent: 5/26/2021   9:19 AM CDT   To: Jodi Kenyon RN   Subject: RE: Restricted Participant Program               I added his psychiatrist Dr. Negron.  For some reason I am not able to add his gender care provider Dr. Sherlyn Juan.  I can find her but I am unable to add her to his care team.  These are the 2 main providers that are needing prescribing rights approved     Becki JENKINS   ----- Message -----   From: Jodi Kenyon RN   Sent: 5/26/2021   8:16 AM CDT   To: BROOKLYN Ford CNP   Subject: Restricted Participant Program

## 2021-06-21 NOTE — TELEPHONE ENCOUNTER
"----- Message from BROOKLYN Ford CNP sent at 6/14/2021 12:22 PM CDT -----  Regarding: FW: home health certification needing signature  Can you determine if I already signed these?  Becki JENKINS  ----- Message -----  From: Angela Guajardo  Sent: 6/14/2021   7:31 AM CDT  To: BROOKLYN Ford CNP  Subject: RE: home health certification needing signat#    Hi Becki,    I think its the same I am not sure.  Do you know if it said Home Health Certification Plan of care at the top?    Can you try going into encounters click on DOS 5/17 then click blue link \"Scan on 6/11/2021  3:46 PM by Luz Pendleton\". This will then open the scanned form.      ----- Message -----  From: Becki Avery APRN CNP  Sent: 6/14/2021   6:57 AM CDT  To: Angela Guajardo  Subject: RE: home health certification needing signat#    There is no attachment. I have signed a paper form in clinic that was faxed, as this is the same?    Becki JENKINS  ----- Message -----  From: Angela Guajardo  Sent: 6/14/2021   6:53 AM CDT  To: BROOKLYN Ford CNP  Subject: home health certification needing signature      JUAN R GARCIA [[id number]]   DOS 5/17    Hi Becki,    This is a home health certification needing signature.  Can you print it out, sign, and send for scanning?    Thank you,   Angela"

## 2021-06-21 NOTE — TELEPHONE ENCOUNTER
"RN spoke to Aminta with [facility]- states they received an order for testosterone lab draw. She went to see patient today and he is requesting to have lab drawn at the end of the week. She needs verbal that this is okay from ordering provider.     She also was confused about \"mid-cycle\" draw- states patient received last dose on 6/16 and isn't due again until 6/30. Writer attempted to clarify as our current orders have patient getting testosterone injection subcutaneous weekly. Aminta states she was referring to patient's fluphenazine decanoate which was prescribed by psychiatry. She states that is the only injection they are giving him. Writer advised that I believed patient was self administering his testosterone as that was the plan when we did teaching in March 2021, Aminta states she will verify with patient that he is injecting weekly as ordered. Routing to ordering provider to advise on when testosterone lab should be drawn.   María Elena Albright RN    "

## 2021-06-22 NOTE — TELEPHONE ENCOUNTER
T lab should be drawn day 3 or 4 after testosterone shot is administered. If Prakash is giving shot on Tues, this would be about Saturday.Thanks for clarifying!  ERM

## 2021-06-22 NOTE — TELEPHONE ENCOUNTER
RN spoke to Aminta HENDERSON RN- she confirmed patient has been self administering Testosterone injections. States she spoke to him and he has been doing shots on Wednesdays. RN advised we would like lab drawn 3-4 days after injection so either Saturday or Sunday. She verbalized understanding and states she will coordinate the draw with patient.   María Elena Albright RN

## 2021-06-23 DIAGNOSIS — Z53.9 DIAGNOSIS NOT YET DEFINED: Primary | ICD-10-CM

## 2021-06-24 ENCOUNTER — TELEPHONE (OUTPATIENT)
Dept: FAMILY MEDICINE | Facility: CLINIC | Age: 31
End: 2021-06-24

## 2021-06-24 NOTE — TELEPHONE ENCOUNTER
----- Message from BROOKLYN Ford CNP sent at 6/23/2021  9:53 AM CDT -----  Please call  Ayana and to see if his gender care provider, Dr. Juan has been approved to prescribe medications.    Becki Avery NP-C  ----- Message -----  From: Jodi Kenyon RN  Sent: 6/23/2021   9:33 AM CDT  To: BROOKLYN Ford CNP    Have your CMA call Prakash's restricted  Ayana.  Phone number is in the care team.  I will be out the end of this week through July 6th.  ----- Message -----  From: Becki Avery APRN CNP  Sent: 6/23/2021   8:41 AM CDT  To: Jodi Kenyon RN    Do we know if Prakash's gender care provider, Dr. Juan has been approved for prescription writing?    Becki Avery NP-C  ----- Message -----  From: Jodi Kenyon RN  Sent: 6/17/2021   9:21 AM CDT  To: BROOKLYN Ford CNP    Got a call back from Prakash's Restricted .  Dr. Negron has been approved for prescription writing now with the correct NPI numbers.

## 2021-06-24 NOTE — TELEPHONE ENCOUNTER
Left message for , Ayana, to return my call. We need to ask if Prakash's gender care provider, Dr. Juan, has been approved to prescribe medications.    Cassia Price, CMA

## 2021-06-25 ENCOUNTER — TRANSFERRED RECORDS (OUTPATIENT)
Dept: HEALTH INFORMATION MANAGEMENT | Facility: CLINIC | Age: 31
End: 2021-06-25

## 2021-06-25 NOTE — PROGRESS NOTES
Mental Health Psychotherapy Note    Patient: Ayana Prasad (Prakash)   : 1990 MRN: [id number]    2021    Start time: 1:11   Stop Time: 1:55   Session # 7    Patient is known to this provider.    Ayana also goes by Prakash but states either name is ok.     Telemedicine Visit: The patient's condition can be safely assessed and treated via synchronous audio and visual telemedicine encounter.      Reason for Telemedicine Visit: Patient has requested telehealth visit and COVID restrictions    Originating Site (Patient Location): Patient's home    Distant Site (Provider Location): Provider Remote Setting    Consent:  The patient/guardian has verbally consented to: the potential risks and benefits of telemedicine (video visit) versus in person care; bill my insurance or make self-payment for services provided; and responsibility for payment of non-covered services.     Mode of Communication:  Used Doximity; Amwell, MyChart would not connect.    As the provider I attest to compliance with applicable laws and regulations related to telemedicine.    Those present for this visit Ayana and this writer    Follow up in regards to ongoing symptom management of  Depression      Session Type: Patient is presenting for an Individual session.    Ayana Prasad is a 30 y.o. adult is being seen today for    Chief Complaint   Patient presents with     Depression     Anxiety     New symptoms or complaints: None reported this session    Functional Impairment:   Personal: 3  Family: 3  Social: 3  Work/School: 3    Clinical assessment of mental status:   Ayana Randall as he prefers to be called presented on time.  He was oriented x3, open and cooperative, and dressed appropriately for this session and weather. His memory is good with Normal cognitive functioning .  His speech was  Delayed/Hesitant, Soft and somewhat slowed.  Language: Prakash had no problems expressing self and has a good vocabulary.  Concentration and  "focus is Within normal. Psychosis is noted or reported. He mood is Blunted and Depressed.  Affect is congruent with speech and is Congruent w/content of speech.  Fund of knowledge is adequate. Insight is adequate for therapy.    ASSESSMENT: Current Emotional / Mental Status (status of significant symptoms):   No change since session dated 5/4/2021              Risk status (Self / Other harm or suicidal ideation)              Patient {denies personal safety concerns               Patient denies current or recent suicidal ideation or behaviors.              Patient denies current or recent homicidal ideation or behaviors.              Patient denies current or recent self injurious behavior or ideation.              Patient denies other safety concerns.              Patient denies change in risk factors.                Patient denies change in protective factors              Recommended that patient call 911 or go to the local ED should there be a change in any of these risk factors.                Attitude:                                   Cooperative  Guarded               Orientation:                             x4              Speech                          Rate / Production:       Slow  Normal                           Volume:                       Normal               Mood:                                      Depressed anxious              Thought Content:                    Hallucinations Not currently present              Thought Form:                        Coherent  Logical               Insight:                                     Good       Patient's impression of their current status: Prakash presents reporting he is doing \"good\".   No psychosis this session.  He states the voices have been manageable the past couple of weeks.  Prakash Reports he failed his class and will repeat it last year. He states he failed because he neglected to complete the work (asignments). Prakash [place].  He is excited about the travel. Prakash reports an entire bottle of Vivance is missing from hid medicine cabinet.  Staff keep meds and keys are kept in a  the office accessable to all. Prakash reports a relapse with drug use. Staff gave him an entire bottle of Klonopin instead of his dose. He states it was not a suicide attempt He wanted to get high. He was taken to the ED was cleared and returned to her adult foster care. Prakash reports he is not walking but plays in the yard with his dog daily. He is waiting to hear where he is on the list for an adult supportive living apartment.    Therapist impression of patients current state: Prakash, comes to therapy reporting stable mood with mild anxiety.  No panic. Schizoaffective disorder stable. Prakash states he is using mindfulness when se thinks of it.  Continues to be animated with clear communications.     Encouraged Prakash to talk with her  about how medications are managed at her foster care. He continues plans to move a supportive apartment.  His goal is to be as independent as possible.  His pet dog stays with him as an emotional .  He states he plays and walks his dog daily. Continues to Vap almost continuously through the day.   Ayana continues to benefit from continued therapy to gain support through daily function and to maintain stability of mood and through Sex reassignment process.     Assessment tools used today include:  AVA-7, PHQ-9, CAGE-AID, C-SSRS and can be found documented in Doc Flowsheets.     Type of psychotherapeutic technique provided: Client centered and Motivational interviewing, Problem Solving. Collaboration with care team at [facility] and [facility]    Progress toward: Using mindfulness. Follows with [facility] Human sexuality clinic providers. Follows with PCP for medication management.     Review of long term goals: Completed with Prakash this session.  Review on  8/2021    Diagnosis:   Schizoaffective disorder; chronic condition, Panic disorder    Plan and Follow-up: Patient will follow up with psychologist at [facility] as needed. Continue to practice Mindfulness. Prakash will follow up with [facility]- department and inform the team of any inrcease in symptoms or new symptoms . Prakash was referred to psychiatry by [facility] team.  This writer to collaborate with care team as needed.     Discharge Criteria/Planning: Client has chronic symptoms and ongoing therapy for maintenance stability recommended.    I have reviewed the note as documented above.  This accurately captures the substance of my conversation with the patient.    As the provider I attest to compliance with applicable laws and regulations related to telemedicine.  Performed and documented by  DESIRE Vanegas, LICSW  5/28/2021

## 2021-06-26 NOTE — PROGRESS NOTES
Mental Health Psychotherapy Note    Patient: Ayana Prasad (Prakash)   : 1990 MRN: [id number]    2021    Start time: 1:05   Stop Time: 1:35   Session # 8    Patient is known to this provider.    Ayana also goes by Prakash but states either name is ok.     Telemedicine Visit: The patient's condition can be safely assessed and treated via synchronous audio and visual telemedicine encounter.      Reason for Telemedicine Visit: Patient has requested telehealth visit and COVID restrictions    Originating Site (Patient Location): Patient's home    Distant Site (Provider Location): Provider Remote Setting    Consent:  The patient/guardian has verbally consented to: the potential risks and benefits of telemedicine (video visit) versus in person care; bill my insurance or make self-payment for services provided; and responsibility for payment of non-covered services.     Mode of Communication:  Used Doximity; Amwell, MyChart would not connect.    As the provider I attest to compliance with applicable laws and regulations related to telemedicine.    Those present for this visit Ayana and this writer    Follow up in regards to ongoing symptom management of  Depression      Session Type: Patient is presenting for an Individual session.    Ayana Prasad is a 30 y.o. adult is being seen today for    Chief Complaint   Patient presents with     Anxiety     New symptoms or complaints: None reported this session    Functional Impairment:   Personal: 3  Family: 3  Social: 3  Work/School: 3    Clinical assessment of mental status:   Ayana Randall as he prefers to be called presented on time.  He was oriented x3, open and cooperative, and dressed appropriately for this session and weather. His memory is good with Normal cognitive functioning .  His speech was  Delayed/Hesitant, Soft and somewhat slowed.  Language: Prakash had no problems expressing self and has a good vocabulary.  Concentration and focus is Within  "normal. Psychosis is noted or reported. He mood is Blunted and Depressed.  Affect is congruent with speech and is Congruent w/content of speech.  Fund of knowledge is adequate. Insight is adequate for therapy.    ASSESSMENT: Current Emotional / Mental Status (status of significant symptoms):   No change since session dated 5/4/2021              Risk status (Self / Other harm or suicidal ideation)              Patient {denies personal safety concerns               Patient denies current or recent suicidal ideation or behaviors.              Patient denies current or recent homicidal ideation or behaviors.              Patient denies current or recent self injurious behavior or ideation.              Patient denies other safety concerns.              Patient denies change in risk factors.                Patient denies change in protective factors              Recommended that patient call 911 or go to the local ED should there be a change in any of these risk factors.                Attitude:                                   Cooperative  Guarded               Orientation:                             x4              Speech                          Rate / Production:       Slow  Normal                           Volume:                       Normal               Mood:                                      Depressed anxious              Thought Content:                    Hallucinations Not currently present              Thought Form:                        Coherent  Logical               Insight:                                     Good       Patient's impression of their current status: Prakash presents reporting he is doing \"good\".   No psychosis this session.  He states the voices have been manageable the past month.   Prakash shared he stays busy with a friend and engaging in community activities. He states he is still waiting to hear where he is on the list for an adult supportive living apartment.  Prakash states he " does not have much to talk about, everything has been good the past month.    Therapist impression of patients current state: Prakash, comes to therapy reporting stable mood with mild anxiety.  No panic. Schizoaffective disorder stable. Prakash reports she continues to use mindfulness as needed.  No indication of psychosis.  Pleasant.  Prakash is agreeable to moving sessions out to 1x a month. He will call if he needs to come in sooner    Assessment tools used today include:  AVA-7, PHQ-9, CAGE-AID, C-SSRS and can be found documented in Doc Flowsheets.     Type of psychotherapeutic technique provided: Client centered and Motivational interviewing, Problem Solving. Collaboration with care team at [facility] and [facility]    Progress toward: Using mindfulness. Follows with [facility] Human sexuality clinic providers. Follows with PCP for medication management.     Review of long term goals: Completed with Prakash this session.  Review on 8/2021    Diagnosis:   Schizoaffective disorder; chronic condition, Panic disorder    Plan and Follow-up: Patient will follow up with psychologist at [facility] as needed. Continue to practice Mindfulness. Prakash will follow up with [facility]-HS department and inform the team of any inrcease in symptoms or new symptoms . Prakash was referred to psychiatry by [facility] team.  This writer to collaborate with care team as needed. Return in 1 month.    Discharge Criteria/Planning: Client has chronic symptoms and ongoing therapy for maintenance stability recommended.    I have reviewed the note as documented above.  This accurately captures the substance of my conversation with the patient.    As the provider I attest to compliance with applicable laws and regulations related to telemedicine.  Performed and documented by  DESIRE Vanegas, LICSW  6/11/2021

## 2021-06-29 ENCOUNTER — TELEPHONE (OUTPATIENT)
Dept: FAMILY MEDICINE | Facility: CLINIC | Age: 31
End: 2021-06-29

## 2021-06-29 DIAGNOSIS — F25.0 SCHIZOAFFECTIVE DISORDER, BIPOLAR TYPE (H): Primary | ICD-10-CM

## 2021-06-29 NOTE — TELEPHONE ENCOUNTER
Reason for Call:  Medication refill     Name of the pharmacy and phone number for the current request: [facility]   [phone]    Name of the medication requested:  IM needles only for fluPHENAZine decanoate (PROLIXIN) 25 MG/ML injection    Other request: Rx can only be placed by an approved restricted provider. If Becki Avery is not able to prescribe this, provider Harvey Negron from [facility] is also approved through [facility].     Can we leave a detailed message on this number? n/a            Call taken on 6/29/2021 at 3:40 PM by JAMIE ANNA

## 2021-06-29 NOTE — TELEPHONE ENCOUNTER
Spoke to Ayana at [facility] program. A form has been received to allow  to treat & prescribe meds. A request has also been received to allow pt to see Georgina Smith in neurosurgery as referred by KELIN Avery on 03/11/21.   Forms to Yasmin in basket, will fax to Ayana @ [phone] when done.

## 2021-07-02 ENCOUNTER — TELEPHONE (OUTPATIENT)
Dept: ENDOCRINOLOGY | Facility: CLINIC | Age: 31
End: 2021-07-02

## 2021-07-06 ENCOUNTER — VIRTUAL VISIT (OUTPATIENT)
Dept: FAMILY MEDICINE | Facility: CLINIC | Age: 31
End: 2021-07-06
Payer: COMMERCIAL

## 2021-07-06 DIAGNOSIS — Z53.9 NO SHOW: Primary | ICD-10-CM

## 2021-07-06 NOTE — PROGRESS NOTES
"Prakash is a 30 year old who is being evaluated via a billable telephone visit.      What phone number would you like to be contacted at? ***  How would you like to obtain your AVS? {AVS Preference:471158}    {PROVIDER CHARTING PREFERENCE:016510}    Subjective   Prakash is a 30 year old who presents for the following health issues {ACCOMPANIED BY STATEMENT (Optional):735859}    HPI     Concern - Dark circles under armpits  Onset: ***  Description: ***  Intensity: {.:752288}  Progression of Symptoms:  {.:869321}  Accompanying Signs & Symptoms: ***  Previous history of similar problem: ***  Precipitating factors:        Worsened by: ***  Alleviating factors:        Improved by: ***  Therapies tried and outcome: {NONE DEFAULTED:920032::\" none \"}    {additonal problems for provider to add (Optional):621041}    Review of Systems   {ROS COMP (Optional):754148}      Objective           Vitals:  No vitals were obtained today due to virtual visit.    Physical Exam   {GENERAL APPEARANCE:50::\"healthy\",\"alert\",\"no distress\"}  PSYCH: Alert and oriented times 3; coherent speech, normal   rate and volume, able to articulate logical thoughts, able   to abstract reason, no tangential thoughts, no hallucinations   or delusions  His affect is { :1382494::\"normal\"}  RESP: No cough, no audible wheezing, able to talk in full sentences  Remainder of exam unable to be completed due to telephone visits    {Diagnostic Test Results (Optional):878773}    {AMBULATORY ATTESTATION (Optional):448733}        Phone call duration: *** minutes  "

## 2021-07-13 ENCOUNTER — VIRTUAL VISIT (OUTPATIENT)
Dept: PSYCHOLOGY | Facility: CLINIC | Age: 31
End: 2021-07-13
Payer: COMMERCIAL

## 2021-07-13 DIAGNOSIS — F25.0 SCHIZOAFFECTIVE DISORDER, BIPOLAR TYPE (H): ICD-10-CM

## 2021-07-13 DIAGNOSIS — F64.0 GENDER DYSPHORIA IN ADOLESCENT AND ADULT: Primary | ICD-10-CM

## 2021-07-13 DIAGNOSIS — F41.1 GENERALIZED ANXIETY DISORDER: ICD-10-CM

## 2021-07-13 PROCEDURE — 99207 PR NO BILLABLE SERVICE THIS VISIT: CPT | Performed by: STUDENT IN AN ORGANIZED HEALTH CARE EDUCATION/TRAINING PROGRAM

## 2021-07-13 PROCEDURE — 90832 PSYTX W PT 30 MINUTES: CPT | Mod: 95 | Performed by: STUDENT IN AN ORGANIZED HEALTH CARE EDUCATION/TRAINING PROGRAM

## 2021-07-13 NOTE — PROGRESS NOTES
[facility] -  Case Progress Note    Date of Service: 21   Legal Name: Ayana Prasad  Chosen name: Prakash Prasad (he/him)   : 1990  Medical Record Number: [id number]  Treating Provider: Dora Mazariegos, PhD - Postdoctoral Fellow   Type of Session: Individual  Present in Session: Prakash   Number of Minutes:  30  DA Completed: 21  Tx Plan Completed: 3/9/21    Health Maintenance Summary - Mental Health Treatment Plan       Status Date      MENTAL HEALTH TX PLAN Next Due 2022      Done 3/9/2021 HIM MENTAL HEALTH TX PLAN SCAN        Video start time: 15:00  Video end time: 15:30    Telemedicine Visit: The patient's condition can be safely assessed and treated via synchronous audio and visual telemedicine encounter.      Reason for Telemedicine Visit: Services only offered telehealth    Originating Site (Patient Location): Patient's home    Distant Site (Provider Location): Provider Remote Setting    Consent:  The patient/guardian has verbally consented to: the potential risks and benefits of telemedicine (video visit) versus in person care; bill my insurance or make self-payment for services provided; and responsibility for payment of non-covered services.     Mode of Communication:  Video Conference via two.42.solutions    As the provider I attest to compliance with applicable laws and regulations related to telemedicine.    Current Symptoms/Status:  Gender Dysphoria: discomfort with birth-assigned sex (female), identifies and expresses gender as male. Dysphoria includes social and anatomical dysphoria. Prakash is pursuing gender-affirming medical interventions and has socially transitioned in most areas of his life.      Significant mental health history, with active diagnoses of Schizoaffective Disorder - Bipolar Type, PTSD, Generalized Anxiety Disorder, ADHD. Mental health symptoms are managed with medication and monitored through routine psychiatry visits and civil commitment. Prakash  "has therapy and [facility] support. Symptoms are reported to be stable at present.      Significant substance use history, including Opioid use Disorder, Stimulant Use Disorder, Cannabis Use Disorder. Specific substance use diagnoses unclear and require further assessment and collateral information.      Progress Toward Treatment Goals:   Prakash began masculinizing HRT under care of [facility] - psychiatric symptoms are reported to be improved this week.    Intervention: Modality and Description:  Primary intervention was supportive individual therapy. He presented today seated upright, was engaged and focused. This was first visit since May 12 - had previously missed a scheduled appointment. Reported that auditory hallucinations have been \"pretty even\" in the past few weeks. Reported an overdose of clonazepam in May resulting in an E.D. visit. Reported goal of overdose was to get high, and denied any suicidal intent. Group home is now monitoring medication and distributing to him. Prakash demonstrated very limited insight into any antecedents to relapse. Reported plan to do a Rule 25 assessment of IOP substance use program with [facility] - had missed first appointment and rescheduled it. Reported he continues to look for alternate living situation - not happy in group home, intends to live alone while retaining all . Continues to see primary MH therapist approximately weekly. Will see me again to discus letter of support for top surgery. Prakash was engaged today and responsive to feedback.       Assignment:  Continue attending to MH stability and use all resources    Interactive Complexity:  There are four specific communication difficulties that complicate the work of the primary psychiatric procedure.  Interactive complexity (+[id number]) may be reported when at least one of these difficulties is present.    Communication difficulties present during current the psychiatric procedure " include:  1. None.    DSM-5 Diagnoses:    F64.0/302.85  - Gender Dysphoria in Adult      F25.0/295.70  - Schizoaffective Disorder, Bipolar Type - per history  F43.10/309.81- Posttraumatic Stress Disorder - per history  F41.1/300.02  - Generalized Anxiety Disorder - per history  F90.9/314.01  - Unspecified Attention Deficit/Hyperactivity Disorder - per history     History of opioid, methamphetamine, and cannabis use disorders.     Plan/Need for Future Services:  Return in two weeks for check-in on mood status and to finalize LOS.        Dora Mazariegos, PhD      TREATMENT PLAN  Date of Treatment Plan: 3/9/21  Name: Ayana Prasad  : 1990  Medical Record Number: [id number]  Treating Provider: Dora Mazariegos, PhD - Postdoctoral Fellow  Type of Session: Individual  Present in Session: Prakash    Current Status:    Depression/Mood:  History of manic episodes with no current mood issues reported.    Anxiety/Panic:  Worry  Physiological reactivity (PTSD)    Thought:   Auditory Hallucinations    Sensorium:  Reports no problems or symptoms at this time    Behavior/Health:  Reports no problems or symptoms at this time    Chemical Use:  Denies both Alcohol and Drug Abuse - hx of substance dependence    Sexual Problems:  Reports no problems or symptoms at this time    Gender Problems:  Body and social dysphoria     Suicide Risk Assessment:  Assessed Level of Immediate Risk:  YES  Ideation:  NO  Plan:  NO  Means:  NO  Intent:  NO    Homicide Risk Assessment:  Assessed Level of Immediate Risk:  YES  Ideation:  NO  Plan:  NO  Means:  NO  Intent:  NO    Impact of Symptoms on Function:  Decreased Physical/Health Status  Decreased Social/Family Function  Decreased Work Function  Decreased School Function    DSM-5 Diagnoses:     F64.0/302.85  - Gender Dysphoria in Adult      F25.0/295.70  - Schizoaffective Disorder, Bipolar Type - per history  F43.10/309.81- Posttraumatic Stress Disorder - per history  F41.1/300.02  -  Generalized Anxiety Disorder - per history  F90.9/314.01  - Unspecified Attention Deficit/Hyperactivity Disorder - per history     History of opioid, methamphetamine, and cannabis use disorders.     Screening Questionnaires:  Please indicate whether the following screening questionnaires have been completed:  CAGE: Yes  PHQ-9: Yes    AVA-7: Yes  Safety Screening: Yes  WHODAS: No    Problem(s):  1. Gender Dysphoria  2. Mental health issues of Schizoaffective Disorder, PTSD, Anxiety, ADHD  3. History of substance abuse    Short-Long Term Goals  Decrease Symptoms  Increase Coping  Monitor Psych Status    Interventions  [facility]  Cognitive-Behavioral Therapy    Expected Outcomes and Prognosis  Expected improvement    Anticipated Treatment Duration:  Unknown    Frequency of Sessions  2 x / Month    Progress Update (for Plan Update Only)  NA:  1st Treatment Planning    Current Psychoactive Medications:    - Prolixin 25mg injection every two weeks for psychotic symptoms  - Lithium 1200mg  - Adderall 10mg  - Vyvanse 50mg  - Ambien 5mg at bedtime  - Klonopin as needed for anxiety  - Zyprexa as needed.   Discharge & Aftercare Goals: To Be Determined.  Care Coordination: Yes; psychiatrist SOPHIA Elena, [facility]; therapist Joana Hagen [facility] through [facility]; ARM worker Nahum Bowman through [facility].     Consent to Treatment:   Patient participated in this treatment planning process and indicated verbal agreement with the above treatment plan.  If patient doesn't sign, indicate why: Telehealth visit due to COVID19 pandemic    Patient Signature: unable to sign - verbal review and consent given through telemedicine  Date: 3/9/21    Provider Signature:     Date: 3/9/21    Supervisor Signature:Hilda Davis PsyD,     Date: 3/14/2021

## 2021-07-14 ENCOUNTER — TELEPHONE (OUTPATIENT)
Dept: FAMILY MEDICINE | Facility: CLINIC | Age: 31
End: 2021-07-14

## 2021-07-14 DIAGNOSIS — F90.2 ATTENTION DEFICIT HYPERACTIVITY DISORDER (ADHD), COMBINED TYPE: ICD-10-CM

## 2021-07-14 DIAGNOSIS — F25.0 SCHIZOAFFECTIVE DISORDER, BIPOLAR TYPE (H): ICD-10-CM

## 2021-07-14 RX ORDER — DEXTROAMPHETAMINE SACCHARATE, AMPHETAMINE ASPARTATE, DEXTROAMPHETAMINE SULFATE AND AMPHETAMINE SULFATE 2.5; 2.5; 2.5; 2.5 MG/1; MG/1; MG/1; MG/1
TABLET ORAL
Qty: 30 TABLET | Refills: 0 | Status: SHIPPED | OUTPATIENT
Start: 2021-07-14 | End: 2021-08-13

## 2021-07-14 RX ORDER — QUETIAPINE FUMARATE 25 MG/1
50 TABLET, FILM COATED ORAL AT BEDTIME
Qty: 30 TABLET | Refills: 0 | Status: SHIPPED | OUTPATIENT
Start: 2021-07-14 | End: 2021-07-14

## 2021-07-14 RX ORDER — QUETIAPINE FUMARATE 50 MG/1
100 TABLET, FILM COATED ORAL AT BEDTIME
Qty: 60 TABLET | Refills: 0 | Status: SHIPPED | OUTPATIENT
Start: 2021-07-14 | End: 2021-09-03

## 2021-07-14 RX ORDER — LISDEXAMFETAMINE DIMESYLATE 50 MG/1
50 CAPSULE ORAL EVERY MORNING
Qty: 30 CAPSULE | Refills: 0 | Status: SHIPPED | OUTPATIENT
Start: 2021-07-14 | End: 2021-08-13

## 2021-07-14 NOTE — TELEPHONE ENCOUNTER
Routing refill request to provider for review/approval because:  Drug not on the [facility] refill protocol     Kristin Machado RN

## 2021-07-14 NOTE — TELEPHONE ENCOUNTER
Called patient and relayed the providers message below. Patient stated they tried to get them refilled but they are restricted and no one else can refill these other than PCP and approved psychiatrist who is out of the [place].     Request updated message be sent back to PCP to reconsider.     Thank you,  Kristin Machado RN

## 2021-07-14 NOTE — TELEPHONE ENCOUNTER
Per  prescription written on July 9 for clonazepam 1 mg number 60 tablets.  Prescribed by Dr.Olukayode Negron.  Only medications that get filled are placed on the .  New prescription will not be given for clonazepam.    Updated prescription sent for Seroquel 100 mg nightly    Becki JENKINS

## 2021-07-14 NOTE — TELEPHONE ENCOUNTER
Patient calling to request refill from Becki Avery as her psychiatrist is out of the country.     clonazePAM (KLONOPIN) 1 MG     tabletlisdexamfetamine (VYVANSE) 50mg     capsuleamphetamine-dextroamphetamine (ADDERALL) 10 MG tablet    QUEtiapine (SEROQUEL) 50 MG tablet

## 2021-07-14 NOTE — TELEPHONE ENCOUNTER
Patient notified of all below, she voiced understanding and agreement.  Jeanna Siduh RN  [facility]

## 2021-07-14 NOTE — TELEPHONE ENCOUNTER
Spoke with patient, she reports she was taking Seroquel 100 mg at bedtime.  She reports that even though it is listed as allergy for palpitations, she continued taking it and is tolerating it and not having palpitations. (RN did not remove from allergy)    She reports she did not  the script for Klonopin on 7/9/21, she says it was written by Dr. Pisano, and is not an approved provider for her, due to being restricted.  Jeanna Sidhu RN  [facility]

## 2021-07-14 NOTE — TELEPHONE ENCOUNTER
Please verify dose and frequency of Seroquel.  I sent it as nightly.  Also, has listed allergy to Seroquel.  If has been tolerating Seroquel okay-please remove from allergy list.    Adderall and Vyvanse prescription sent.    Request for Klonopin denied.  Just filled on July 9th. 60 tablets.  That prescription should last 1 month.  Recent ER visit due to Klonopin abuse.    Becki CARVALHOC

## 2021-07-15 ENCOUNTER — TELEPHONE (OUTPATIENT)
Dept: PSYCHIATRY | Facility: CLINIC | Age: 31
End: 2021-07-15

## 2021-07-15 NOTE — TELEPHONE ENCOUNTER
"PSYCHIATRY CLINIC PHONE INTAKE     SERVICES REQUESTED / INTERESTED IN          Med Management    Presenting Problem and Brief History                              What would you like to be seen for? (brief description):  Current meds are gabapentin, vyvanse, adderall, lithium, and prolixin injection. Patient feels they are working well but is wanting to discontinue the prolixin. Meds were most recently refilled by PCP Becki Aveyr and \"psych doctor's boss\".  Have you received a mental health diagnosis? Yes   Which one (s): Schizophrenia, ADHD  Is there any history of developmental delay?  No   Are you currently seeing a mental health provider?  Yes            Who / month last seen:  Dora Mazariegos at [facility]  Do you have mental health records elsewhere?  Yes  Will you sign a release so we can obtain them?  Yes    Do you have current thoughts of self-harm?  No    Do you currently have thoughts of harming others?  No         Social History     Who is the patient's a guardian?  No    Name / number:   Have you had an ACT team in last 12 months?  No  Describe:    Do you have any current or past legal issues?  No  Describe:   OK to leave a detailed voicemail?  Yes      Medical/ Surgical History                                   Patient Active Problem List   Diagnosis     ADHD (attention deficit hyperactivity disorder)     Bipolar 1 disorder, manic, mild     Marijuana abuse     Polysubstance abuse (H)     GERD (gastroesophageal reflux disease)     Tobacco abuse     Abdominal pain, right upper quadrant     Intractable back pain     Optic neuritis     Cauda equina syndrome with neurogenic bladder (H)     Schizoaffective disorder, bipolar type (H)     PTSD (post-traumatic stress disorder)     Anxiety     Auditory hallucination     Class 2 obesity due to excess calories in adult     Nephrolithiasis     Cyst of left ovary     Borderline personality disorder (H)     Cannabis dependence (H)     Depression     Episodic mood disorder " "(H)     History of heroin abuse (H)     Moderate episode of recurrent major depressive disorder (H)     Opioid use disorder, severe, dependence (H)     Substance-induced psychotic disorder with hallucinations (H)     Nausea     Overdose     Suicidal ideation     Bella (H)     Spell of altered consciousness     Urinary retention     Obesity (BMI 35.0-39.9) with comorbidity (H)     Chronic bilateral low back pain without sciatica     AVA (generalized anxiety disorder)     Aggressive behavior     Gender identity disorder     Lumbosacral radiculopathy at L5     DUB (dysfunctional uterine bleeding)     Seizure-like activity (H)     Elevated blood pressure reading without diagnosis of hypertension          Medications             Current Outpatient Medications   Medication Sig Dispense Refill     amphetamine-dextroamphetamine (ADDERALL) 10 MG tablet Take 1 tablet daily in the afternoon. 30 tablet 0     clonazePAM (KLONOPIN) 1 MG tablet Take 1 tablet (1 mg) by mouth 2 times daily as needed for anxiety 30 tablet 0     escitalopram (LEXAPRO) 10 MG tablet TAKE 1 TABLET BY MOUTH DAILY 30 tablet 0     fluPHENAZine decanoate (PROLIXIN) 25 MG/ML injection INJECT 1ML (25MG) INTRAMUSCULARLY EVERY 14 DAYS 6 mL 0     gabapentin (NEURONTIN) 300 MG capsule TAKE 3 CAPSULES (900MG) AND TAKE 4 CAPSULES (1200MG ) BY MOUTH MIDDAY DAY AND TAKE 3 CAPSULES (900MG) BY MOUTH EVERY EVENING 300 capsule 11     lisdexamfetamine (VYVANSE) 50 MG capsule Take 1 capsule (50 mg) by mouth every morning 30 capsule 0     lithium ER (LITHOBID) 300 MG CR tablet Take one tab (1) each morning and three (3) tabs at bedtime 120 tablet 0     needle, disp, 18G X 1\" MISC Use for drawing up weekly testosterone dose 50 each 3     Needle, Disp, 25G X 5/8\" MISC Use for injecting weekly testosterone dose 50 each 3     nicotine (NICORETTE) 2 MG gum Place 1 each (2 mg) inside cheek as needed for smoking cessation 50 each 3     OLANZapine (ZYPREXA) 2.5 MG tablet Take 2.5 mg " "by mouth 2 times daily as needed       omeprazole (PRILOSEC) 20 MG DR capsule TAKE 1 CAPSULE BY MOUTH DAILY 90 capsule 0     ondansetron (ZOFRAN ODT) 4 MG ODT tab Take 1 tablet (4 mg) by mouth every 8 hours as needed 10 tablet 0     QUEtiapine (SEROQUEL) 50 MG tablet Take 2 tablets (100 mg) by mouth At Bedtime 60 tablet 0     syringe, disposable, 1 ML MISC Use for weekly testosterone dose 60 each 3     Syringe/Needle, Disp, 21G X 1\" 3 ML MISC 1 each every 14 days 6 each 3     testosterone cypionate (DEPOTESTOSTERONE) 200 MG/ML injection Inject 0.2 mLs (40 mg) Subcutaneous once a week 4 mL 0     zolpidem (AMBIEN) 5 MG tablet Take 1 tablet (5 mg) by mouth At Bedtime 30 tablet 0         DISPOSITION      Completed phone screen, scheduled with Regine Last.    Radha Ibarra,   "

## 2021-07-16 ENCOUNTER — VIRTUAL VISIT (OUTPATIENT)
Dept: BEHAVIORAL HEALTH | Facility: CLINIC | Age: 31
End: 2021-07-16
Payer: COMMERCIAL

## 2021-07-16 ENCOUNTER — DOCUMENTATION ONLY (OUTPATIENT)
Dept: BEHAVIORAL HEALTH | Facility: CLINIC | Age: 31
End: 2021-07-16

## 2021-07-16 DIAGNOSIS — F33.1 MODERATE EPISODE OF RECURRENT MAJOR DEPRESSIVE DISORDER (H): ICD-10-CM

## 2021-07-16 DIAGNOSIS — F25.0 SCHIZOAFFECTIVE DISORDER, BIPOLAR TYPE (H): Primary | ICD-10-CM

## 2021-07-16 PROCEDURE — 90834 PSYTX W PT 45 MINUTES: CPT | Mod: 95 | Performed by: SOCIAL WORKER

## 2021-07-16 ASSESSMENT — ANXIETY QUESTIONNAIRES
5. BEING SO RESTLESS THAT IT IS HARD TO SIT STILL: MORE THAN HALF THE DAYS
6. BECOMING EASILY ANNOYED OR IRRITABLE: NEARLY EVERY DAY
GAD7 TOTAL SCORE: 14
7. FEELING AFRAID AS IF SOMETHING AWFUL MIGHT HAPPEN: SEVERAL DAYS
3. WORRYING TOO MUCH ABOUT DIFFERENT THINGS: MORE THAN HALF THE DAYS
1. FEELING NERVOUS, ANXIOUS, OR ON EDGE: NEARLY EVERY DAY
2. NOT BEING ABLE TO STOP OR CONTROL WORRYING: MORE THAN HALF THE DAYS

## 2021-07-16 ASSESSMENT — COLUMBIA-SUICIDE SEVERITY RATING SCALE - C-SSRS
2. HAVE YOU ACTUALLY HAD ANY THOUGHTS OF KILLING YOURSELF?: YES
LETHALITY/MEDICAL DAMAGE CODE MOST RECENT ACTUAL ATTEMPT: NO PHYSICAL DAMAGE OR VERY MINOR PHYSICAL DAMAGE
6. HAVE YOU EVER DONE ANYTHING, STARTED TO DO ANYTHING, OR PREPARED TO DO ANYTHING TO END YOUR LIFE?: NO
1. IN THE PAST MONTH, HAVE YOU WISHED YOU WERE DEAD OR WISHED YOU COULD GO TO SLEEP AND NOT WAKE UP?: YES
ATTEMPT PAST THREE MONTHS: NO
TOTAL  NUMBER OF ABORTED OR SELF INTERRUPTED ATTEMPTS PAST LIFETIME: NO
4. HAVE YOU HAD THESE THOUGHTS AND HAD SOME INTENTION OF ACTING ON THEM?: NO
3. HAVE YOU BEEN THINKING ABOUT HOW YOU MIGHT KILL YOURSELF?: YES
TOTAL  NUMBER OF ABORTED OR SELF INTERRUPTED ATTEMPTS PAST 3 MONTHS: NO
REASONS FOR IDEATION PAST MONTH: MOSTLY TO END OR STOP THE PAIN (YOU COULDN'T GO ON LIVING WITH THE PAIN OR HOW YOU WERE FEELING)
1. IN THE PAST MONTH, HAVE YOU WISHED YOU WERE DEAD OR WISHED YOU COULD GO TO SLEEP AND NOT WAKE UP?: YES
ATTEMPT LIFETIME: NO
2. HAVE YOU ACTUALLY HAD ANY THOUGHTS OF KILLING YOURSELF?: NO PLAN
5. HAVE YOU STARTED TO WORK OUT OR WORKED OUT THE DETAILS OF HOW TO KILL YOURSELF? DO YOU INTEND TO CARRY OUT THIS PLAN?: NO
4. HAVE YOU HAD THESE THOUGHTS AND HAD SOME INTENTION OF ACTING ON THEM?: NO
REASONS FOR IDEATION LIFETIME: MOSTLY TO END OR STOP THE PAIN (YOU COULDN'T GO ON LIVING WITH THE PAIN OR HOW YOU WERE FEELING)
5. HAVE YOU STARTED TO WORK OUT OR WORKED OUT THE DETAILS OF HOW TO KILL YOURSELF? DO YOU INTEND TO CARRY OUT THIS PLAN?: NO
6. HAVE YOU EVER DONE ANYTHING, STARTED TO DO ANYTHING, OR PREPARED TO DO ANYTHING TO END YOUR LIFE?: NO
TOTAL  NUMBER OF INTERRUPTED ATTEMPTS LIFETIME: NO
2. HAVE YOU ACTUALLY HAD ANY THOUGHTS OF KILLING YOURSELF LIFETIME?: YES
TOTAL  NUMBER OF INTERRUPTED ATTEMPTS PAST 3 MONTHS: NO

## 2021-07-16 ASSESSMENT — PATIENT HEALTH QUESTIONNAIRE - PHQ9
5. POOR APPETITE OR OVEREATING: SEVERAL DAYS
SUM OF ALL RESPONSES TO PHQ QUESTIONS 1-9: 14

## 2021-07-16 NOTE — PROGRESS NOTES
"                                           Ayanaaleksander Jenkinsnah     SAFETY PLAN:  Step 1: Warning signs / cues (Thoughts, images, mood, situation, behavior) that a crisis may be developing:    Thoughts: \"I can't do this anymore\"    Images: being hurt by others         Thinking Processes: ruminations (can't stop thinking about my problems): others critisims, feeling others don't like or appprove of me., intrusive thoughts (bothersome, unwanted thoughts that come out of nowhere): Not being approved of; judged. and highly critical and negative thoughts: blaming others for hard things that happen    Mood: worsening depression, hopelessness, intense worry, agitation and disinhibited (not caring about things or consequences)    Behaviors: using drugs, aggression and not sleeping enough    Situations: relationship problems   Step 2: Coping strategies - Things I can do to take my mind off of my problems without contacting another person (relaxation technique, physical activity):    Distress Tolerance Strategies,  arts and crafts: coloring, play with my pet , listen to positive and upbeat music,  read a book: history or mystery.    Physical Activities: play with my dog    Focus on helpful thoughts:  \"This is temporary\", \"I will get through this\", \"It always passes\" and think about happy memories.  Step 3: People and social settings that provide distraction:   Name: Mother Phone:    Name: Senia Phone:    Name: [employer] worker Joshua Phone:     movie theater, pet store/humane society, zoo and coffee shop   Step 4: Remind myself of people and things that are important to me and worth living for:  Getting my own apartment, completing gender reassignment.  Step 5: When I am in crisis, I can ask these people to help me use my safety plan:   Name: Mom Phone:    Name: Senia Phone:    Name: Nadine Phone:   Step 6: Making the environment safe:     remove drugs, dispose of old medications , remove things I could use to hurt myself,  " and be around others  Step 7: Professionals or agencies I can contact during a crisis:    [facility] Daytime Number: [phone]    Suicide Prevention Lifeline: 0-494-407-ACAS (7315)    Crisis Text Line Service (available 24 hours a day, 7 days a week): Text [place] to 156731  [facility] Crisis Services: [phone]    Call 911 or go to my nearest emergency department.   I helped develop this safety plan and agree to use it when needed.  I have been given a copy of this plan.      Client signature Prakash gives verbal agreement. Copy of this plan sent to Prakash in his inbasket.   Today s date:  7/16/2021  Adapted from Safety Plan Template 2008 Vane Rivers and Eron Jaimes is reprinted with the express permission of the authors.  No portion of the Safety Plan Template may be reproduced without the express, written permission.  You can contact the authors at [email or web address] or [email or web address].

## 2021-07-16 NOTE — PROGRESS NOTES
8/2/2021 opened to correct time used for visit on 7/16/2021      Progress Note    Patient Name: Ayana Prasad  Date: 7/16/2021       Service Type: Individual      Session Start Time: 12:59  Session End Time:1:46     Session Length: 47 min    Session #: 11     Attendees: Client attended alone    Service Modality:  Video Visit:      Provider verified identity through the following two step process.  Patient provided:  Patient is known previously to provider    Telemedicine Visit: The patient's condition can be safely assessed and treated via synchronous audio and visual telemedicine encounter.      Reason for Telemedicine Visit: Patient has requested telehealth visit    Originating Site (Patient Location): Patient's home    lDistant Site (Provider Location): [facility]: [facility] and [place].    Consent:  The patient/guardian has verbally consented to: the potential risks and benefits of telemedicine (video visit) versus in person care; bill my insurance or make self-payment for services provided; and responsibility for payment of non-covered services.     Patient would like the video invitation sent by:  My Chart    Mode of Communication:  Video Conference via [facility]    As the provider I attest to compliance with applicable laws and regulations related to telemedicine.     Treatment Plan Last Reviewed:   PHQ-9 / AVA-7 :     DATA  Interactive Complexity: No  Crisis: No       Progress Since Last Session (Related to Symptoms / Goals / Homework):   Symptoms: No change schizoaffective disorder    Homework: Partially completed      Episode of Care Goals: Achieved / completed to satisfaction - CONTEMPLATION (Considering change and yet undecided); Intervened by assessing the negative and positive thinking (ambivalence) about behavior change     Current / Ongoing Stressors and Concerns:   Lives in a group home. SPMI: symptoms up and down, relationship  conflicts.      Treatment Objective(s) Addressed in This Session:   use at least 2 coping skills for anxiety management in the next 2 weeks  Coloring  Plying with pet dog  Talking to mom or health care workers     Intervention:   Motivational Interviewing: client-centered; Explore and resolve ambivalence to elicite behavior change.  support through exploration of motives to resolve of ambivalence to change.        ASSESSMENT: Current Emotional / Mental Status (status of significant symptoms):   Risk status (Self / Other harm or suicidal ideation)   Patient denies current fears or concerns for personal safety.   Patient denies current or recent suicidal ideation or behaviors.   Patient denies current or recent homicidal ideation or behaviors.   Patient denies current or recent self injurious behavior or ideation.   Patient denies other safety concerns.   Patient reports there has been no change in risk factors since their last session.     Patient reports there has been no change in protective factors since their last session.     A safety and risk management plan has been developed including: Patient consented to co-developed          safety plan.  Safety and risk management plan was completed.  Patient agreed to use safety plan                         should any safety concerns arise.  A copy was given to the patient.     Appearance:   Appropriate  Body odor   Eye Contact:   Poor   Psychomotor Behavior: Normal  Restless    Attitude:   Guarded  Indifferent Evasive   Orientation:   All   Speech    Rate / Production: Normal/ Responsive Normal     Volume:  Normal    Mood:    Depressed  Apathetic Ambivalent   Affect:    Blunted    Thought Content:  Delusions hears voices that are directive to harm self or ignore      professionals.   Thought Form:  Coherent  Psychosis   Insight:    Good  and Fair      Medication Review:   No changes to current psychiatric medication(s)     Medication Compliance:   Yes     Changes in  Health Issues:   None reported     Chemical Use Review:   Substance Use: Chemical use reviewed, no active concerns identified   History of meth use.                                             Last use 6 months ago.  Will begin harm reduction treatment wit [facility].     Tobacco Use: No change in amount of tobacco use since last session.  Reviewed information and     resources for quitting. Repeats every session. Vapping much of the day.     Diagnosis:  Schizoaffective disorder-Depressed      Collateral Reports Completed:   No release needs identified this session.    PLAN: (Patient Tasks / Therapist Tasks / Other)    Use distractions identified in session to reduce/manage anxiety that leads to suicidal ideations.  Follow-up with psychiatry as scheduled. Follow safety plan. Continue to contact mother every day.         Joana Hagen, [facility]  7/16/2021                                                         ______________________________________________________________________    Treatment Plan    Patient's Name: Ayana Prasad  YOB: 1990    Date: 7/16/2021    DSM5 Diagnoses: 295.70  (F25.1) Schizoaffective Disorder Depressive Type  Psychosocial / Contextual Factors: in group home; relationship issues.    WHODAS: 22    Referral / Collaboration:  No referral or collaboration needs identified this sessions.     Anticipated number of session or this episode of care: 24+      MeasurableTreatment Goal(s) related to diagnosis / functional impairment(s)  Goal 1:  Decrease or alleviate symptoms of depression.   I will know I've met my goal when I no longer have thoughts of suicide.    Objective #A Patient will Decrease frequency and intensity of feeling down, depressed, hopeless  Improve quantity and quality of night time sleep / decrease daytime naps  Identify negative self-talk and behaviors: challenge core beliefs, myths, and actions  Decrease thoughts that you'd be better off dead or of suicide /  self-harm.   Patient will follow her safety plan; using skills to manage symptoms    Status: Restarted - Date: 7/16/2021     Intervention(s)  Therapist will provide client centered therapy and DBT: mindfulness and emotional regulation..   Patient will follow her safety plan; using skills to manage symptoms    Goal 2: Reduce/manage anxiety    I will know I've met my goal when I can recognize and mange anxiety.      Objective #A Patient will identify at least 3 triggers for anxiety.    Status: Restarted - Date: 7/16/2021       Intervention(s)  Therapist will provide CBT: identify negative self defeating thoughts that exacerbate anxiousness and lead to suicidal ideations.      Patient has reviewed and agreed to the above plan.      Joana Hagen, [facility]  July 16, 2021

## 2021-07-17 ASSESSMENT — ANXIETY QUESTIONNAIRES: GAD7 TOTAL SCORE: 14

## 2021-07-20 NOTE — PROGRESS NOTES
Assessment & Plan     Acne vulgaris  Will refer to dermatology.  Would recommend avoiding oral antibiotics, history of C. difficile.  Encouraged over-the-counter benzoyl peroxide wash.  - clindamycin (CLINDAMAX) 1 % external gel; Apply topically 2 times daily  - Adult Dermatology Referral; Future  Previous notes reviewed.  Referral form completed for dermatology    Candidal intertrigo  Educated on use.  Notify if no improvement  - nystatin (MYCOSTATIN) 502747 UNIT/GM external cream; Apply topically 2 times daily Apply to affected area and armpits twice per day until redness resolves    High priority for 2019-nCoV vaccine  Administered today in clinic  - COVID-19,PF,MODERNA    Acanthosis nigricans  Previous labs reviewed.  Elevated A1c  Explanation given    PTSD (post-traumatic stress disorder)  Referral form completed for psychiatrist    AVA (generalized anxiety disorder)  Referral form completed for psychiatrist    Schizoaffective disorder, bipolar type (H)  Referral form completed for psychiatrist    Attention deficit hyperactivity disorder (ADHD), combined type  Referral form completed for psychiatrist        Review of external notes as documented elsewhere in note  Review of the result(s) of each unique test - CBC, CMP-glucose, A1c  Ordering of each unique test  Prescription drug management  38 minutes spent on the date of the encounter doing chart review, history and exam, documentation and further activities per the note        Return in about 4 weeks (around 8/18/2021) for Covid vaccine.    BROOKLYN Cruz [facility] [place] is a 30 year old who presents for the following health issues     HPI     Chief Complaint   Patient presents with     Multiple Concerns     Imm/Inj     COVID-19 VACCINE     Needs letter stating his dog is his emotional support animal.    Skin under arms is darker in color.    Feels cysts in earlobes.     Discuss getting covid vaccine  today. Prefers if it is given by provider.       For the last couple of weeks has noticed that skin to armpits is darker in color.  Armpits do occasionally itch.  No new products.    Needs letter for emotional support animal.  Current residence is okay with him having dog, nugget.. Is currently looking for an apartment.  Needs letter for new residence.  Dog, Nugget. Has had for 4-5 months. Nugget helps to calm anxiety, helps make voices quiter, helps to decrease twitching. Knows when is having nightmares and she wakes her up. Helps to keep Randall active. Has to take for a walk.     Has cysts to ear lobes for theast 1-2 weeks. Some are hard and round. They hurt. Has had some drainage from ear lobes. Unsure of the color of drainage.  Acne has increased since started testosterone.  Was referred to dermatologist.  Needs referral placed by me due to being on restricted program.    Needs referral sheet filled out for new psychiatrist.  Previous psychiatrist was not approved by Medicare.    Would like to get COVID-19 vaccine today.    Review of Systems   Constitutional: Negative for chills, diaphoresis, fatigue and fever.   HENT: Negative for ear discharge, ear pain, hearing loss, rhinorrhea, sinus pressure and sore throat.    Eyes: Negative for discharge and itching.   Respiratory: Negative for cough, shortness of breath and wheezing.    Gastrointestinal: Negative for constipation, diarrhea and nausea.   Skin: Negative for rash.        Cyst/acne to earlobes    Dark skin to underarms   Neurological: Negative for dizziness, light-headedness and headaches.   Psychiatric/Behavioral: Positive for hallucinations (Auditory-loud today). Negative for suicidal ideas. The patient is nervous/anxious.             Objective    /88 (BP Location: Left arm, Patient Position: Sitting, Cuff Size: Adult Large)   Pulse 104   Temp 99.2  F (37.3  C) (Tympanic)   Resp 20   Wt 105.5 kg (232 lb 9.6 oz)   BMI 37.54 kg/m    Body mass  index is 37.54 kg/m .  Physical Exam  Constitutional:       Appearance: He is well-developed.   Cardiovascular:      Rate and Rhythm: Normal rate and regular rhythm.   Pulmonary:      Effort: Pulmonary effort is normal.      Breath sounds: Normal breath sounds.   Skin:     General: Skin is warm.      Comments: Acanthosis nigricans to bilateral axilla    Scattered acne.   Neurological:      Mental Status: He is alert.   Psychiatric:         Attention and Perception: He perceives auditory hallucinations.         Mood and Affect: Mood is anxious.         Cognition and Memory: Impaired cognition:

## 2021-07-21 ENCOUNTER — OFFICE VISIT (OUTPATIENT)
Dept: FAMILY MEDICINE | Facility: CLINIC | Age: 31
End: 2021-07-21
Payer: COMMERCIAL

## 2021-07-21 VITALS
HEART RATE: 104 BPM | DIASTOLIC BLOOD PRESSURE: 88 MMHG | WEIGHT: 232.6 LBS | TEMPERATURE: 99.2 F | BODY MASS INDEX: 37.54 KG/M2 | RESPIRATION RATE: 20 BRPM | SYSTOLIC BLOOD PRESSURE: 122 MMHG

## 2021-07-21 DIAGNOSIS — F41.1 GAD (GENERALIZED ANXIETY DISORDER): ICD-10-CM

## 2021-07-21 DIAGNOSIS — F90.2 ATTENTION DEFICIT HYPERACTIVITY DISORDER (ADHD), COMBINED TYPE: ICD-10-CM

## 2021-07-21 DIAGNOSIS — F43.10 PTSD (POST-TRAUMATIC STRESS DISORDER): ICD-10-CM

## 2021-07-21 DIAGNOSIS — B37.2 CANDIDAL INTERTRIGO: Primary | ICD-10-CM

## 2021-07-21 DIAGNOSIS — F25.0 SCHIZOAFFECTIVE DISORDER, BIPOLAR TYPE (H): ICD-10-CM

## 2021-07-21 DIAGNOSIS — L83 ACANTHOSIS NIGRICANS: ICD-10-CM

## 2021-07-21 DIAGNOSIS — L70.0 ACNE VULGARIS: ICD-10-CM

## 2021-07-21 DIAGNOSIS — Z23 HIGH PRIORITY FOR 2019-NCOV VACCINE: ICD-10-CM

## 2021-07-21 PROCEDURE — 91301 COVID-19,PF,MODERNA: CPT | Performed by: NURSE PRACTITIONER

## 2021-07-21 PROCEDURE — 99214 OFFICE O/P EST MOD 30 MIN: CPT | Mod: 25 | Performed by: NURSE PRACTITIONER

## 2021-07-21 PROCEDURE — 0011A COVID-19,PF,MODERNA: CPT | Performed by: NURSE PRACTITIONER

## 2021-07-21 RX ORDER — NYSTATIN 100000 U/G
CREAM TOPICAL 2 TIMES DAILY
Qty: 60 G | Refills: 2 | Status: ON HOLD | OUTPATIENT
Start: 2021-07-21 | End: 2021-11-27

## 2021-07-21 RX ORDER — CLINDAMYCIN PHOSPHATE 10 MG/G
GEL TOPICAL 2 TIMES DAILY
Qty: 60 G | Refills: 4 | Status: SHIPPED | OUTPATIENT
Start: 2021-07-21 | End: 2022-04-15

## 2021-07-21 ASSESSMENT — ENCOUNTER SYMPTOMS
SHORTNESS OF BREATH: 0
DIAPHORESIS: 0
SORE THROAT: 0
NERVOUS/ANXIOUS: 1
RHINORRHEA: 0
WHEEZING: 0
DIARRHEA: 0
EYE DISCHARGE: 0
DIZZINESS: 0
HALLUCINATIONS: 1
SINUS PRESSURE: 0
CHILLS: 0
NAUSEA: 0
HEADACHES: 0
LIGHT-HEADEDNESS: 0
COUGH: 0
EYE ITCHING: 0
FATIGUE: 0
FEVER: 0
CONSTIPATION: 0

## 2021-07-21 ASSESSMENT — PAIN SCALES - GENERAL: PAINLEVEL: NO PAIN (0)

## 2021-07-21 NOTE — LETTER
[facility] [place]  [id number]  [place] [address]  Phone: [phone]    July 21, 2021        Prakash Ayers [facility]  [facility] [id number]        RE: Prakash Prasad    Please allow Prakash to have his emotional support dog, Nugget.  Prakash has had Nugget for 4 to 5 months.  Nugget helps to calm his anxiety.  She helps to make voice is quieter.  Nugget also helps decrease twitching.  Nugget knows when Prakash is having nightmares and she will wake him up.  Nugget also helps to keep Prakash active.  Nugget also gives Prakash a sense of purpose because Nugget depends on him.    Please contact me for questions or concerns.      Sincerely,      BROOKLYN Cruz CNP

## 2021-07-27 ENCOUNTER — VIRTUAL VISIT (OUTPATIENT)
Dept: PSYCHOLOGY | Facility: CLINIC | Age: 31
End: 2021-07-27
Payer: COMMERCIAL

## 2021-07-27 DIAGNOSIS — F64.0 GENDER DYSPHORIA IN ADOLESCENT AND ADULT: Primary | ICD-10-CM

## 2021-07-27 PROCEDURE — 99207 PR NO BILLABLE SERVICE THIS VISIT: CPT | Performed by: STUDENT IN AN ORGANIZED HEALTH CARE EDUCATION/TRAINING PROGRAM

## 2021-07-27 PROCEDURE — 90832 PSYTX W PT 30 MINUTES: CPT | Mod: 95 | Performed by: STUDENT IN AN ORGANIZED HEALTH CARE EDUCATION/TRAINING PROGRAM

## 2021-07-27 NOTE — PROGRESS NOTES
[facility] -  Case Progress Note    Date of Service: 21   Legal Name: Ayana Prasad  Chosen name: Prakash Prasad (he/him)   : 1990  Medical Record Number: [id number]  Treating Provider: Dora Mazariegos, PhD - Postdoctoral Fellow   Type of Session: Individual  Present in Session: Prakash   Number of Minutes:  30  DA Completed: 21  Tx Plan Completed: 3/9/21    Health Maintenance Summary - Mental Health Treatment Plan       Status Date      MENTAL HEALTH TX PLAN Next Due 2022      Done 3/9/2021 HIM MENTAL HEALTH TX PLAN SCAN        Video start time: 15:00  Video end time: 15:30    Telemedicine Visit: The patient's condition can be safely assessed and treated via synchronous audio and visual telemedicine encounter.      Reason for Telemedicine Visit: Services only offered telehealth    Originating Site (Patient Location): Patient's home    Distant Site (Provider Location): Provider Remote Setting    Consent:  The patient/guardian has verbally consented to: the potential risks and benefits of telemedicine (video visit) versus in person care; bill my insurance or make self-payment for services provided; and responsibility for payment of non-covered services.     Mode of Communication:  Video Conference via [employer]    As the provider I attest to compliance with applicable laws and regulations related to telemedicine.    Current Symptoms/Status:  Gender Dysphoria: discomfort with birth-assigned sex (female), identifies and expresses gender as male. Dysphoria includes social and anatomical dysphoria. Prakash is pursuing gender-affirming medical interventions and has socially transitioned in most areas of his life.      Significant mental health history, with active diagnoses of Schizoaffective Disorder - Bipolar Type, PTSD, Generalized Anxiety Disorder, ADHD. Mental health symptoms are managed with medication and monitored through routine psychiatry visits and civil commitment. Prakash  "has therapy and [facility] support. Symptoms are reported to be stable at present.      Significant substance use history, including Opioid use Disorder, Stimulant Use Disorder, Cannabis Use Disorder. Specific substance use diagnoses unclear and require further assessment and collateral information.      Progress Toward Treatment Goals:   Prakash began masculinizing HRT under care of [facility] - psychiatric symptoms are reported to be improved this week.    Intervention: Modality and Description:  Primary intervention was supportive individual therapy. He presented today seated upright, was engaged and focused. Prakash reported feeling well today, symptoms reported as stable. Prakash reported no longer pursuing Rule 25 assessment for IOP substance use treatment. Intention now is to participate in a harm reduction program through [facility] - currently [facility] for this. Reported not recommended for [facility] treatment as he has been sober several months and recent overdose that led to E.D. visit was \"a one-time thing.\" Most recent reported use was cannabis 5 months ago. Prakash inquired about whether substance use would make him \"ineligible\" for top surgery. He stated that he is not planning to start using substances but ideally would be able to \"smoke marijuana once in a while.\" Discussed WPATH requirements of reasonably well-controlled mental health, and gave feedback that he is at risk for problematic use based on history, and concern for exacerbation MH symptoms with use. Recommended to also discuss this with psychiatry provider. Scheduled for intake with Regine BARAHONA in Aug 2021.Prakash was engaged today and responsive to feedback.       Assignment:  Continue attending to MH stability and use all resources    Interactive Complexity:  There are four specific communication difficulties that complicate the work of the primary psychiatric procedure.  Interactive complexity (+[id number]) may be " reported when at least one of these difficulties is present.    Communication difficulties present during current the psychiatric procedure include:  1. None.    DSM-5 Diagnoses:    F64.0/302.85  - Gender Dysphoria in Adult      F25.0/295.70  - Schizoaffective Disorder, Bipolar Type - per history  F43.10/309.81- Posttraumatic Stress Disorder - per history  F41.1/300.02  - Generalized Anxiety Disorder - per history  F90.9/314.01  - Unspecified Attention Deficit/Hyperactivity Disorder - per history     History of opioid, methamphetamine, and cannabis use disorders.     Plan/Need for Future Services:  Return in two weeks for check-in on mood status and to finalize LOS.        Dora Mazariegos, PhD      TREATMENT PLAN  Date of Treatment Plan: 3/9/21  Name: Ayana Prasad  : 1990  Medical Record Number: [id number]  Treating Provider: Dora Mazariegos, PhD - Postdoctoral Fellow  Type of Session: Individual  Present in Session: Prakash    Current Status:    Depression/Mood:  History of manic episodes with no current mood issues reported.    Anxiety/Panic:  Worry  Physiological reactivity (PTSD)    Thought:   Auditory Hallucinations    Sensorium:  Reports no problems or symptoms at this time    Behavior/Health:  Reports no problems or symptoms at this time    Chemical Use:  Denies both Alcohol and Drug Abuse - hx of substance dependence    Sexual Problems:  Reports no problems or symptoms at this time    Gender Problems:  Body and social dysphoria     Suicide Risk Assessment:  Assessed Level of Immediate Risk:  YES  Ideation:  NO  Plan:  NO  Means:  NO  Intent:  NO    Homicide Risk Assessment:  Assessed Level of Immediate Risk:  YES  Ideation:  NO  Plan:  NO  Means:  NO  Intent:  NO    Impact of Symptoms on Function:  Decreased Physical/Health Status  Decreased Social/Family Function  Decreased Work Function  Decreased School Function    DSM-5 Diagnoses:     F64.0/302.85  - Gender Dysphoria in Adult      F25.0/295.70  -  Schizoaffective Disorder, Bipolar Type - per history  F43.10/309.81- Posttraumatic Stress Disorder - per history  F41.1/300.02  - Generalized Anxiety Disorder - per history  F90.9/314.01  - Unspecified Attention Deficit/Hyperactivity Disorder - per history     History of opioid, methamphetamine, and cannabis use disorders.     Screening Questionnaires:  Please indicate whether the following screening questionnaires have been completed:  CAGE: Yes  PHQ-9: Yes    VAA-7: Yes  Safety Screening: Yes  WHODAS: No    Problem(s):  1. Gender Dysphoria  2. Mental health issues of Schizoaffective Disorder, PTSD, Anxiety, ADHD  3. History of substance abuse    Short-Long Term Goals  Decrease Symptoms  Increase Coping  Monitor Psych Status    Interventions  [facility]  Cognitive-Behavioral Therapy    Expected Outcomes and Prognosis  Expected improvement    Anticipated Treatment Duration:  Unknown    Frequency of Sessions  2 x / Month    Progress Update (for Plan Update Only)  NA:  1st Treatment Planning    Current Psychoactive Medications:    - Prolixin 25mg injection every two weeks for psychotic symptoms  - Lithium 1200mg  - Adderall 10mg  - Vyvanse 50mg  - Ambien 5mg at bedtime  - Klonopin as needed for anxiety  - Zyprexa as needed.   Discharge & Aftercare Goals: To Be Determined.  Care Coordination: Yes; psychiatrist SOPHIA Elena, [facility]; therapist JULISA Vanegas through [facility]; [facility] worker Nahum Bowman through hospitals.     Consent to Treatment:   Patient participated in this treatment planning process and indicated verbal agreement with the above treatment plan.  If patient doesn't sign, indicate why: Telehealth visit due to COVID19 pandemic    Patient Signature: unable to sign - verbal review and consent given through telemedicine  Date: 3/9/21    Provider Signature:     Date: 3/9/21    Supervisor Signature:Hilda Davis PsyD, CLAUDE    Date: 3/14/2021

## 2021-07-29 ENCOUNTER — MYC MEDICAL ADVICE (OUTPATIENT)
Dept: FAMILY MEDICINE | Facility: CLINIC | Age: 31
End: 2021-07-29

## 2021-07-29 ENCOUNTER — PATIENT OUTREACH (OUTPATIENT)
Dept: PLASTIC SURGERY | Facility: CLINIC | Age: 31
End: 2021-07-29

## 2021-07-29 DIAGNOSIS — Q18.1 EAR CYSTS: Primary | ICD-10-CM

## 2021-07-29 NOTE — PATIENT INSTRUCTIONS
Spoke with pt regarding upcoming appt with Dr. Mayers on 9/14/21 at 1pm. Requested pt move appt time to 2pm. Pt confirms he is available at this time. Appt rescheduled. Lissett BARNEY RN, BSN

## 2021-07-29 NOTE — TELEPHONE ENCOUNTER
Patient was seen 7/21/21 by Becki Avery for candidal intertrigo and acne vulgaris.   Derm referral placed.      I routed response to patient advising she call for dermatology referral.    Edith Batista RN  [facility]

## 2021-07-29 NOTE — TELEPHONE ENCOUNTER
See patient's response, says Becki was going to send an oral antibiotic if the clindamycin gel did not work.    Patient says the clindamycin gel is not working.    Routed to PCP to address.    Edith Batista RN  [facility]

## 2021-07-30 ENCOUNTER — VIRTUAL VISIT (OUTPATIENT)
Dept: BEHAVIORAL HEALTH | Facility: CLINIC | Age: 31
End: 2021-07-30
Payer: COMMERCIAL

## 2021-07-30 DIAGNOSIS — F25.9 SCHIZOAFFECTIVE DISORDER, CHRONIC CONDITION (H): Primary | ICD-10-CM

## 2021-07-30 DIAGNOSIS — F33.1 MODERATE EPISODE OF RECURRENT MAJOR DEPRESSIVE DISORDER (H): ICD-10-CM

## 2021-07-30 DIAGNOSIS — F60.3 BORDERLINE PERSONALITY DISORDER (H): ICD-10-CM

## 2021-07-30 PROCEDURE — 90834 PSYTX W PT 45 MINUTES: CPT | Mod: 95 | Performed by: SOCIAL WORKER

## 2021-08-02 DIAGNOSIS — R11.2 NON-INTRACTABLE VOMITING WITH NAUSEA, UNSPECIFIED VOMITING TYPE: ICD-10-CM

## 2021-08-02 NOTE — PROGRESS NOTES
Progress Note    Patient Name: Ayana Prasad  Date: 7/30/2021       Service Type: Individual      Session Start Time: 12:59  Session End Time:1:46     Session Length: 47 min    Session #: 12    Attendees: Client attended alone    Service Modality:  Video Visit:      Provider verified identity through the following two step process.  Patient provided:  Patient is known previously to provider    Telemedicine Visit: The patient's condition can be safely assessed and treated via synchronous audio and visual telemedicine encounter.      Reason for Telemedicine Visit: Patient has requested telehealth visit    Originating Site (Patient Location): Patient's home    lDistant Site (Provider Location): [facility]: [facility] and [place].    Consent:  The patient/guardian has verbally consented to: the potential risks and benefits of telemedicine (video visit) versus in person care; bill my insurance or make self-payment for services provided; and responsibility for payment of non-covered services.     Patient would like the video invitation sent by:  My Chart    Mode of Communication:  Video Conference via [employer]    As the provider I attest to compliance with applicable laws and regulations related to telemedicine.     Treatment Plan Last Reviewed: 7/16/2021  PHQ-9 / AVA-7 :     DATA  Interactive Complexity: No  Crisis: No       Progress Since Last Session (Related to Symptoms / Goals / Homework):   Symptoms: No change schizoaffective disorder    Homework: Partially completed      Episode of Care Goals: Achieved / completed to satisfaction - CONTEMPLATION (Considering change and yet undecided); Intervened by assessing the negative and positive thinking (ambivalence) about behavior change     Current / Ongoing Stressors and Concerns:   Lives in a group home. SPMI: symptoms up and down, relationship conflicts. Planning to move into supportive independent living  apartment. Relationship problems patient is reluctant to discuss. Planning visit with her parents in [facility].     Treatment Objective(s) Addressed in This Session:    Use at least 2 coping skills for anxiety management in the next 2 weeks  Coloring  Plying with pet dog  Talking to mom or health care workers    7/30/2021:  Identify when to use coping skills.     Intervention:   Motivational Interviewing: client-centered; Explore and resolve ambivalence to elicite behavior change.  support through exploration of motives to resolve of ambivalence to change.        ASSESSMENT: Current Emotional / Mental Status (status of significant symptoms):   Risk status (Self / Other harm or suicidal ideation)   Patient denies current fears or concerns for personal safety.   Patient denies current or recent suicidal ideation or behaviors.   Patient denies current or recent homicidal ideation or behaviors.   Patient denies current or recent self injurious behavior or ideation.   Patient denies other safety concerns.   Patient reports there has been no change in risk factors since their last session.     Patient reports there has been no change in protective factors since their last session.     A safety and risk management plan has been developed including: Patient consented to co-developed          safety plan.  Safety and risk management plan was completed.  Patient agreed to use safety plan                         should any safety concerns arise.  A copy was given to the patient.     Appearance:   Approprate   Eye Contact:   Poor   Psychomotor Behavior: Normal  Restless    Attitude:   Guarded  Indifferent Evasive   Orientation:   All   Speech    Rate / Production: Normal/ Responsive Normal     Volume:  Normal    Mood:    Depressed  Apathetic Ambivalent   Affect:    Blunted    Thought Content:  Delusions hears voices that are directive to harm self or ignore      professionals.   Thought Form:  Coherent   Psychosis   Insight:    Good  and Fair      Medication Review:   No changes to current psychiatric medication(s)     Medication Compliance:   Yes     Changes in Health Issues:   None reported     Chemical Use Review:   Substance Use: Chemical use reviewed, no active concerns identified   History of meth use.                                             Last use 6 months ago.  Will begin harm reduction treatment wit [facility].     Tobacco Use: No change in amount of tobacco use since last session.  Reviewed information and     resources for quitting. Repeats every session. Vapping much of the day.     Diagnosis:  Schizoaffective disorder-Depressed      Collateral Reports Completed:   No release needs identified this session.    PLAN: (Patient Tasks / Therapist Tasks / Other)    Continue to use distractions identified in sessions to reduce/manage anxiety and depression that leads to suicidal ideations.  Follow-up with psychiatry as scheduled. Follow safety plan. Follow up with providers at the [facility] including psychiatry. Engage in activities as able with friends. Continue to contact mother every day.         Joana Hagen, [facility]  7/30/2021                                                         ______________________________________________________________________    Treatment Plan    Patient's Name: Ayana Prasad  YOB: 1990    Date: 7/16/2021    DSM5 Diagnoses: 295.70  (F25.1) Schizoaffective Disorder Depressive Type  Psychosocial / Contextual Factors: in group home; relationship issues.    WHODAS: 22    Referral / Collaboration:  No referral or collaboration needs identified this sessions.     Anticipated number of session or this episode of care: 24+      MeasurableTreatment Goal(s) related to diagnosis / functional impairment(s)  Goal 1:  Decrease or alleviate symptoms of depression.   I will know I've met my goal when I no longer have thoughts of suicide.    Objective #A  Patient will Decrease frequency and intensity of feeling down, depressed, hopeless  Improve quantity and quality of night time sleep / decrease daytime naps  Identify negative self-talk and behaviors: challenge core beliefs, myths, and actions  Decrease thoughts that you'd be better off dead or of suicide / self-harm.   Patient will follow her safety plan; using skills to manage symptoms    Status: Restarted - Date: 7/16/2021     Intervention(s)  Therapist will provide client centered therapy and DBT: mindfulness and emotional regulation..   Patient will follow her safety plan; using skills to manage symptoms    Goal 2: Reduce/manage anxiety    I will know I've met my goal when I can recognize and mange anxiety.      Objective #A Patient will identify at least 3 triggers for anxiety.    Status: Restarted - Date: 7/16/2021       Intervention(s)  Therapist will provide CBT: identify negative self defeating thoughts that exacerbate anxiousness and lead to suicidal ideations.      Patient has reviewed and agreed to the above plan.      Joana Hagen, [facility]  July 16, 2021

## 2021-08-03 DIAGNOSIS — K21.9 GASTRO-ESOPHAGEAL REFLUX DISEASE WITHOUT ESOPHAGITIS: ICD-10-CM

## 2021-08-03 DIAGNOSIS — F25.0 SCHIZOAFFECTIVE DISORDER, BIPOLAR TYPE (H): Primary | ICD-10-CM

## 2021-08-03 PROCEDURE — 99207 PR MD RECERTIFICATION HHA PT: CPT | Performed by: NURSE PRACTITIONER

## 2021-08-05 DIAGNOSIS — Z53.9 DIAGNOSIS NOT YET DEFINED: Primary | ICD-10-CM

## 2021-08-05 PROCEDURE — G0179 MD RECERTIFICATION HHA PT: HCPCS | Performed by: NURSE PRACTITIONER

## 2021-08-05 NOTE — TELEPHONE ENCOUNTER
Left message on voice mail for patient to call clinic.   [phone]  Jeanna Sidhu RN  [facility]

## 2021-08-05 NOTE — TELEPHONE ENCOUNTER
Please call patient and get update on how areas are doing.  Very leery to order oral antibiotic due to previous history of C. difficile.  When is his appointment with dermatology?    Becki CARVALHOC

## 2021-08-06 NOTE — PATIENT INSTRUCTIONS
Call to pt and family, advised of needing PICC insertion on return. States she will be home on Monday. Can schedule for 8/11 at 11AM, Cal Chauhan with pt. Pended PICC order. These are general instructions and may not be specific to you. Please call, email or follow up if you have any questions or concerns.   Patient Education     Strep Screen (Rapid)  Does this test have other names?  Throat swab, rapid strep test, rapid antigen test   What is this test?  The rapid strep screen is used to test for bacteria called group A streptococcus. Group A streptococcus bacteria cause illnesses such as strep throat and scarlet fever--a rash that may happen after a case of strep throat. Strep throat and scarlet fever can cause a number of symptoms, particularly a fever and a sore throat. These illnesses are quite contagious and need antibiotics to treat.   Healthcare providers have two ways to test for group A streptococcus. For the rapid strep screen, your healthcare provider or a nurse takes a sample of cells from your tonsils and back of the throat and tests it right in the healthcare provider's office. You can get your results in as little as 5 minutes. If the rapid strep screen is positive, you have strep throat and no further tests may be needed.   Why do I need this test?  You may need this test if your healthcare provider suspects that you have strep throat. Symptoms of strep throat can include:    Sore throat    Painful or difficult swallowing    Fever    Swelling or tenderness of the glands in the neck    Nausea or vomiting    Skin rash    Stomachache    Headache    Lack of appetite    Tonsils that are swollen and red    Patches of white on the tongue or throat  You may need this test to confirm you have a bacterial infection instead of a viral infection before a healthcare provider will prescribe antibiotics. You may also need this test if the results of a throat culture, which can provide a more accurate diagnosis, are unavailable for a few days.   What other tests might I have along with this test?  If the rapid strep screen is negative, your healthcare provider may do another test  called throat culture to make sure that strep is not the cause of your sore throat and other symptoms. This test also requires taking a swab of cells from your tonsils or back of the throat. The sample is sent to a lab, where it is grown, or cultured, and tested for strep bacteria. The results are available in about 2 days. Your results will reveal whether you have group A streptococcus.   Your healthcare provider may also order:    Influenza (flu) test    Mononucleosis (mono spot) test  What do my test results mean?  Test results may vary depending on your age, gender, health history, the method used for the test, and other things. Your test results may not mean you have a problem. Ask your healthcare provider what your test results mean for you.   Your test results will show whether you have group A streptococcus bacteria in the cells or mucus of your throat. A normal (negative) result will not show any group A streptococcus bacteria. If the test is positive, that means bacteria have been found and you likely have strep throat.   How is this test done?  The rapid strep screen requires taking a swab of mucus or cells from the back of your throat. The healthcare provider, nurse, or  will gently swipe the back of your throat with a long cotton swab. A second sample may be taken at the same time to be used in a throat culture if the rapid strep screen is negative.   Does this test pose any risks?  This test poses no known risks.  What might affect my test results?  Nothing is likely to affect the results of your test, as only the presence of group A streptococcus bacteria should give you a positive result.  How do I get ready for this test?  You don't need to prepare for this test. Be sure your healthcare provider knows about all medicines, herbs, vitamins, and supplements you are taking. This includes medicines that don't need a prescription and any illicit drugs you may use.     [id number] The \[facility]\""  [employer]. [address] [place] [address], [place] [address]. All rights reserved. This information is not intended as a substitute for professional medical care. Always follow your healthcare professional's instructions.

## 2021-08-06 NOTE — TELEPHONE ENCOUNTER
Called [phone] (home)     Did patient answer the phone: No, left a message on voicemail to return call to the [facility] at [phone].    Information below per provider also sent to patient per elda.    Tran Thibodeaux, RN  Triage Nurse  [facility]: [facility]

## 2021-08-09 NOTE — TELEPHONE ENCOUNTER
Routed to PCP to please advise.    Tran, RN  Triage Nurse  [facility]: [facility]

## 2021-08-09 NOTE — TELEPHONE ENCOUNTER
Called patient.  Did patient answer the phone: No, left a message on voicemail to return call to the [facility] at [phone].    Tran Thibodeaux, RN  Triage Nurse  [facility]: [facility]

## 2021-08-10 DIAGNOSIS — F90.2 ATTENTION DEFICIT HYPERACTIVITY DISORDER (ADHD), COMBINED TYPE: ICD-10-CM

## 2021-08-11 ENCOUNTER — TELEPHONE (OUTPATIENT)
Dept: PSYCHIATRY | Facility: CLINIC | Age: 31
End: 2021-08-11

## 2021-08-11 ENCOUNTER — VIRTUAL VISIT (OUTPATIENT)
Dept: PSYCHIATRY | Facility: CLINIC | Age: 31
End: 2021-08-11
Attending: NURSE PRACTITIONER
Payer: COMMERCIAL

## 2021-08-11 ENCOUNTER — MYC REFILL (OUTPATIENT)
Dept: FAMILY MEDICINE | Facility: CLINIC | Age: 31
End: 2021-08-11

## 2021-08-11 DIAGNOSIS — F64.9 GENDER DYSPHORIA: ICD-10-CM

## 2021-08-11 DIAGNOSIS — F15.21 AMPHETAMINE USE DISORDER, SEVERE, IN SUSTAINED REMISSION (H): ICD-10-CM

## 2021-08-11 DIAGNOSIS — F25.0 SCHIZOAFFECTIVE DISORDER, BIPOLAR TYPE (H): Primary | ICD-10-CM

## 2021-08-11 DIAGNOSIS — F43.10 PTSD (POST-TRAUMATIC STRESS DISORDER): ICD-10-CM

## 2021-08-11 DIAGNOSIS — F17.200 NICOTINE USE DISORDER: ICD-10-CM

## 2021-08-11 DIAGNOSIS — F12.21 CANNABIS USE DISORDER, SEVERE, IN EARLY REMISSION (H): ICD-10-CM

## 2021-08-11 DIAGNOSIS — F11.21 OPIOID USE DISORDER, SEVERE, IN SUSTAINED REMISSION (H): ICD-10-CM

## 2021-08-11 DIAGNOSIS — Z79.899 MEDICATION MANAGEMENT: ICD-10-CM

## 2021-08-11 PROCEDURE — 99215 OFFICE O/P EST HI 40 MIN: CPT | Mod: 95 | Performed by: NURSE PRACTITIONER

## 2021-08-11 RX ORDER — CEPHALEXIN 500 MG/1
500 CAPSULE ORAL 4 TIMES DAILY
Qty: 40 CAPSULE | Refills: 0 | Status: SHIPPED | OUTPATIENT
Start: 2021-08-11 | End: 2021-08-21

## 2021-08-11 NOTE — TELEPHONE ENCOUNTER
----- Message from BROOKLYN Calvin CNP sent at 8/11/2021 10:54 AM CDT -----  Regarding: VAL  Would you send VAL for pt to sign via [email or web address] to get record from [facility]?  Phone: [phone]  Fax: [phone]    Looks like they have 2 locations, but same phone and fax number.  I don't know which office pt was seen.    Also, pt is going to send change of provider form as they are restricted pt to PCP currently.    Thank you.      Regine Last APRN CNP Snyder, David J, RN  Sorry, I forgot.  Also would you send verbal ok to talk to CM form?  Senia Arreaga [phone]     Thank you!           =========================================    Routed forms to pt via [employer].

## 2021-08-11 NOTE — TELEPHONE ENCOUNTER
On August 11, 2021, at 9:30 AM, writer called patient at [phone] to confirm Virtual Visit. Writer unable to make contact with patient. Writer unable to leave detailed voice mail message due to voice mail box full. Debbie Ray, [facility]

## 2021-08-11 NOTE — TELEPHONE ENCOUNTER
Routing refill request to provider for review/approval because:  Drug not on the [facility] refill protocol         Alba Black RN  [facility]
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
## 2021-08-12 NOTE — PATIENT INSTRUCTIONS
-Ambien is discontinued as you are not taking the medications  -Continue all other medications for now.  Refill requests are sent to primary care.    -Complete thyroid lab before next appt.    -Please send change of provider form via BlueSnap.  -Please sign and send back release of information form to your  and [facility].    Your next appointment is scheduled on 9/7/2021 (TUe) at 4:30pm.    Thank you for coming to the [facility].    Lab Testing:  If you had lab testing today and your results are reassuring or normal they will be mailed to you or sent through Rumgr within 7 days. If the lab tests need quick action we will call you with the results. The phone number we will call with results is # [phone] (home) . If this is not the best number please call our clinic and change the number.    Medication Refills:  If you need any refills please call your pharmacy and they will contact us. Our fax number for refills is [phone]. Please allow three business for refill processing. If you need to  your refill at a new pharmacy, please contact the new pharmacy directly. The new pharmacy will help you get your medications transferred.     Scheduling:  If you have any concerns about today's visit or wish to schedule another appointment please call our office during normal business hours [phone] (8-5:00 M-F)    Contact Us:  Please call [phone] during business hours (8-5:00 M-F).  If after clinic hours, or on the weekend, please call  [phone].    Financial Assistance [phone]  [facility] Billing [phone]  Central Billing Office, [facility]: [phone]  [facility] [phone]  Medical Records [phone]      MENTAL HEALTH CRISIS NUMBERS:  For a medical emergency please call  911 or go to the nearest ER.     [facility]:   [facility] -[phone]   Crisis Residence [facility] -[phone]   Walk-In Counseling Center [facility] -[phone]   COPE 24/7 [facility] Mobile Team -[phone] (adults)/[phone] (child)  CHILD: [facility] needs assessment team - [phone]      [facility]:   [facility] - [phone]   Walk-in counseling [facility] - [phone]   Walk-in counseling [facility] - [phone]   Crisis Residence [facility] - [phone]  Urgent Care Adult Mental Bdf[facility]-[phone] mobile unit/ 24/7 crisis line    National Crisis Numbers:   National Suicide Prevention Lifeline: 0-311-227-TALK (021-728-9462)  Poison Control Center - 3-191-388-6981  [employer]/resources for a list of additional resources (SOS)  Trans Lifeline a hotline for transgender people 8-405-217-5695  The [employer] a hotline for LGBT youth 4-672-278-7805  Crisis Text Line: For any crisis 24/7   To: 808127  see www.crisistextline.org  - IF MAKING A CALL FEELS TOO HARD, send a text!         Again thank you for choosing [facility] MENTAL HEALTH & ADDICTION [facility] and please let us know how we can best partner with you to improve you and your family's health.    You may be receiving a survey regarding this appointment. We would love to have your feedback, both positive and negative. The survey is done by an external company, so your answers are anonymous.

## 2021-08-13 RX ORDER — DEXTROAMPHETAMINE SACCHARATE, AMPHETAMINE ASPARTATE, DEXTROAMPHETAMINE SULFATE AND AMPHETAMINE SULFATE 2.5; 2.5; 2.5; 2.5 MG/1; MG/1; MG/1; MG/1
TABLET ORAL
Qty: 30 TABLET | Refills: 0 | Status: SHIPPED | OUTPATIENT
Start: 2021-08-13 | End: 2021-09-13

## 2021-08-13 RX ORDER — LISDEXAMFETAMINE DIMESYLATE 50 MG
CAPSULE ORAL
Qty: 30 CAPSULE | Refills: 0 | Status: SHIPPED | OUTPATIENT
Start: 2021-08-13 | End: 2021-09-13

## 2021-08-18 RX ORDER — TESTOSTERONE CYPIONATE 200 MG/ML
40 INJECTION, SOLUTION INTRAMUSCULAR WEEKLY
Qty: 4 ML | Refills: 0 | Status: SHIPPED | OUTPATIENT
Start: 2021-08-18 | End: 2021-10-11

## 2021-08-19 ENCOUNTER — TELEPHONE (OUTPATIENT)
Dept: PSYCHOLOGY | Facility: CLINIC | Age: 31
End: 2021-08-19

## 2021-08-19 NOTE — TELEPHONE ENCOUNTER
No show for virtual appointment at 11am on 8/19. Attempted to call at 11:15 - no response and voicemail full. As this was final scheduled session with intention to review letter of support, will attempt to reach again by phone.

## 2021-08-24 ENCOUNTER — VIRTUAL VISIT (OUTPATIENT)
Dept: BEHAVIORAL HEALTH | Facility: CLINIC | Age: 31
End: 2021-08-24
Payer: COMMERCIAL

## 2021-08-24 DIAGNOSIS — F25.9 SCHIZOAFFECTIVE DISORDER, CHRONIC CONDITION (H): Primary | ICD-10-CM

## 2021-08-24 DIAGNOSIS — F41.1 GAD (GENERALIZED ANXIETY DISORDER): ICD-10-CM

## 2021-08-24 PROCEDURE — 90834 PSYTX W PT 45 MINUTES: CPT | Mod: 95 | Performed by: SOCIAL WORKER

## 2021-08-24 NOTE — PROGRESS NOTES
Progress Note    Patient Name: Ayana Prasad  Date: 8/24/2021       Service Type: Individual      Session Start Time: 3:05  Session End Time: 3:43     Session Length: 38 min    Session #: 13    Attendees: Client attended alone    Service Modality:  Video Visit:      Provider verified identity through the following two step process.  Patient provided:  Patient is known previously to provider    Telemedicine Visit: The patient's condition can be safely assessed and treated via synchronous audio and visual telemedicine encounter.      Reason for Telemedicine Visit: Patient has requested telehealth visit    Originating Site (Patient Location): Patient's home    lDistant Site (Provider Location): [facility]: [facility] and [place].    Consent:  The patient/guardian has verbally consented to: the potential risks and benefits of telemedicine (video visit) versus in person care; bill my insurance or make self-payment for services provided; and responsibility for payment of non-covered services.     Patient would like the video invitation sent by:  My Chart    Mode of Communication:  Video Conference via [employer]    As the provider I attest to compliance with applicable laws and regulations related to telemedicine.     Treatment Plan Last Reviewed: 7/16/2021  PHQ-9 / AVA-7 :     DATA  Interactive Complexity: No  Crisis: No       Progress Since Last Session (Related to Symptoms / Goals / Homework):   Symptoms: No change schizoaffective disorder  Less anxiety; auditory hallucinations continue but with less intensity.   Homework: Partially completed      Episode of Care Goals: Achieved / completed to satisfaction - CONTEMPLATION (Considering change and yet undecided); Intervened by assessing the negative and positive thinking (ambivalence) about behavior change     Current / Ongoing Stressors and Concerns:   Ayana says she is able to ignore voices.  She is aware  her stress level makes a difference in the intensity of auditory hallucinations.  She has had an uneventful summer. She plans an outting wih friends tomorrow and is trying [place] a way to the state fair.  She now has  an [employer] worker she believes to help her locate independent living ap[artment.  She is ready to live on her own and states she is not apprehensive at all.     Ayana is quiet today. Not engaging.  States its hard for her when she has so many providers all talking about the same things. Will move visits out 1x4 weeks.     Treatment Objective(s) Addressed in This Session:    Use at least 2 coping skills for anxiety management in the next 2 weeks.   Discussed safety plan.  Will edit the plan when Ayana relocates to independant living.    Review best time to use coping skills. Stress reduction can be ongoing.      Intervention:   Motivational Interviewing: client-centered; Explore and resolve ambivalence to elicite behavior change.  support through exploration of motives to resolve of ambivalence to change.   Motivational interviewing to identify the thoughts and feelings that lead to continue anxiety and help her to develop new thought patterns to aid in symptom management/reduction.          ASSESSMENT: Current Emotional / Mental Status (status of significant symptoms):   Risk status (Self / Other harm or suicidal ideation)   Patient denies current fears or concerns for personal safety.   Patient denies current or recent suicidal ideation or behaviors.   Patient denies current or recent homicidal ideation or behaviors.   Patient denies current or recent self injurious behavior or ideation.   Patient denies other safety concerns.   Patient reports there has been no change in risk factors since their last session.     Patient reports there has been no change in protective factors since their last session.     A safety and risk management plan has been developed including: Patient consented to co-developed           safety plan.  Safety and risk management plan was completed.  Patient agreed to use safety plan                         should any safety concerns arise.  A copy was given to the patient.     Appearance:   Approprate   Eye Contact:   Poor   Psychomotor Behavior: Normal  Restless    Attitude:   Guarded  Indifferent Evasive   Orientation:   All   Speech    Rate / Production: Normal/ Responsive Normal     Volume:  Normal    Mood:    Depressed  Apathetic Ambivalent   Affect:    Blunted    Thought Content:  Delusions hears voices that are directive to harm self or ignore      professionals.   Thought Form:  Coherent  Psychosis   Insight:    Good  and Fair      Medication Review:   No changes to current psychiatric medication(s)     Medication Compliance:   Yes     Changes in Health Issues:   None reported     Chemical Use Review:   Substance Use: Chemical use reviewed, no active concerns identified   History of meth use.                                             Last use 6 months ago.  Will begin harm reduction treatment wit [facility].     Tobacco Use: No change in amount of tobacco use since last session.  Reviewed information and     resources for quitting. Repeats every session. Vapping much of the day.     Diagnosis:  Schizoaffective disorder-Depressed      Collateral Reports Completed:   No release needs identified this session.    PLAN: (Patient Tasks / Therapist Tasks / Other)    Continue to use distractions identified in sessions to reduce/manage anxiety and depression that leads to suicidal ideations. Continue stress management skills/activities; Walking pet, tech of relaxation; deep breathing, diversion activities.    Follow-up with psychiatry as scheduled. Follow safety plan. [facility] including psychiatry.  Continue to engage in activities as able with friends. Continue to contact mother every day. Return 9/10/2021      Joana Hagen, JULISA   8/24/2021                                                         ______________________________________________________________________    Treatment Plan    Patient's Name: Ayana Prasad  YOB: 1990    Date: 7/16/2021    DSM5 Diagnoses: 295.70  (F25.1) Schizoaffective Disorder Depressive Type  Psychosocial / Contextual Factors: in group home; relationship issues.    WHODAS: 22    Referral / Collaboration:  No referral or collaboration needs identified this sessions.     Anticipated number of session or this episode of care: 24+      MeasurableTreatment Goal(s) related to diagnosis / functional impairment(s)  Goal 1:  Decrease or alleviate symptoms of depression.   I will know I've met my goal when I no longer have thoughts of suicide.    Objective #A Patient will Decrease frequency and intensity of feeling down, depressed, hopeless  Improve quantity and quality of night time sleep / decrease daytime naps  Identify negative self-talk and behaviors: challenge core beliefs, myths, and actions  Decrease thoughts that you'd be better off dead or of suicide / self-harm.   Patient will follow her safety plan; using skills to manage symptoms    Status: Restarted - Date: 7/16/2021     Intervention(s)  Therapist will provide client centered therapy and DBT: mindfulness and emotional regulation..   Patient will follow her safety plan; using skills to manage symptoms    Goal 2: Reduce/manage anxiety    I will know I've met my goal when I can recognize and mange anxiety.      Objective #A Patient will identify at least 3 triggers for anxiety.    Status: Restarted - Date: 7/16/2021       Intervention(s)  Therapist will provide CBT: identify negative self defeating thoughts that exacerbate anxiousness and lead to suicidal ideations.      Patient has reviewed and agreed to the above plan.      Joana Hagen, [facility]  July 16, 2021

## 2021-08-27 ENCOUNTER — TEAM CONFERENCE (OUTPATIENT)
Dept: PSYCHOLOGY | Facility: CLINIC | Age: 31
End: 2021-08-27

## 2021-08-27 NOTE — TELEPHONE ENCOUNTER
Prakash did not attend his final scheduled appointment with me on 8/19/21 and did not respond to outreach. Purpose of final session was to review letter of support for upcoming top surgery consultation in September with Dr. Mayers. As we were unable to review letter together and finalize, letter is not submitted. This writer will be leaving [place] for Sexual Health and unable to meet with Prakash to complete letter. Prakash's drafted letter will be accessible in this writer's files within [facility] and can be accessed by a future [facility] provider.

## 2021-08-31 DIAGNOSIS — R11.2 NON-INTRACTABLE VOMITING WITH NAUSEA, UNSPECIFIED VOMITING TYPE: ICD-10-CM

## 2021-08-31 DIAGNOSIS — F25.0 SCHIZOAFFECTIVE DISORDER, BIPOLAR TYPE (H): ICD-10-CM

## 2021-08-31 DIAGNOSIS — F90.2 ATTENTION DEFICIT HYPERACTIVITY DISORDER (ADHD), COMBINED TYPE: ICD-10-CM

## 2021-09-02 ENCOUNTER — MEDICAL CORRESPONDENCE (OUTPATIENT)
Dept: HEALTH INFORMATION MANAGEMENT | Facility: CLINIC | Age: 31
End: 2021-09-02
Payer: COMMERCIAL

## 2021-09-02 NOTE — TELEPHONE ENCOUNTER
Prescription approved per [facility] Refill Protocol.-Prilosec    Will send refill request on Seroquel to provider due to allergy.

## 2021-09-03 RX ORDER — QUETIAPINE FUMARATE 50 MG/1
TABLET, FILM COATED ORAL
Qty: 60 TABLET | Refills: 0 | Status: SHIPPED | OUTPATIENT
Start: 2021-09-03 | End: 2021-10-08

## 2021-09-07 ENCOUNTER — TELEPHONE (OUTPATIENT)
Dept: PSYCHIATRY | Facility: CLINIC | Age: 31
End: 2021-09-07

## 2021-09-07 ENCOUNTER — VIRTUAL VISIT (OUTPATIENT)
Dept: PSYCHIATRY | Facility: CLINIC | Age: 31
End: 2021-09-07
Attending: NURSE PRACTITIONER
Payer: COMMERCIAL

## 2021-09-07 DIAGNOSIS — F12.21 CANNABIS USE DISORDER, SEVERE, IN EARLY REMISSION (H): ICD-10-CM

## 2021-09-07 DIAGNOSIS — F43.10 PTSD (POST-TRAUMATIC STRESS DISORDER): ICD-10-CM

## 2021-09-07 DIAGNOSIS — F11.21 OPIOID USE DISORDER, SEVERE, IN SUSTAINED REMISSION (H): ICD-10-CM

## 2021-09-07 DIAGNOSIS — F90.2 ATTENTION DEFICIT HYPERACTIVITY DISORDER (ADHD), COMBINED TYPE: ICD-10-CM

## 2021-09-07 DIAGNOSIS — F25.0 SCHIZOAFFECTIVE DISORDER, BIPOLAR TYPE (H): Primary | ICD-10-CM

## 2021-09-07 DIAGNOSIS — F15.21 AMPHETAMINE USE DISORDER, SEVERE, IN SUSTAINED REMISSION (H): ICD-10-CM

## 2021-09-07 DIAGNOSIS — F17.200 NICOTINE USE DISORDER: ICD-10-CM

## 2021-09-07 PROCEDURE — 99214 OFFICE O/P EST MOD 30 MIN: CPT | Mod: 95 | Performed by: NURSE PRACTITIONER

## 2021-09-07 RX ORDER — ESCITALOPRAM OXALATE 10 MG/1
10 TABLET ORAL DAILY
Qty: 30 TABLET | Refills: 0 | Status: CANCELLED | OUTPATIENT
Start: 2021-09-07

## 2021-09-07 RX ORDER — CLONAZEPAM 1 MG/1
1 TABLET ORAL 2 TIMES DAILY PRN
Qty: 5 TABLET | Refills: 0 | Status: CANCELLED | OUTPATIENT
Start: 2021-09-07

## 2021-09-07 RX ORDER — QUETIAPINE FUMARATE 50 MG/1
TABLET, FILM COATED ORAL
Qty: 60 TABLET | Refills: 0 | Status: CANCELLED | OUTPATIENT
Start: 2021-09-07

## 2021-09-07 RX ORDER — CLINDAMYCIN PHOSPHATE 10 MG/G
GEL TOPICAL 2 TIMES DAILY
Qty: 60 G | Refills: 4 | Status: CANCELLED | OUTPATIENT
Start: 2021-09-07

## 2021-09-07 RX ORDER — LITHIUM CARBONATE 300 MG/1
TABLET, FILM COATED, EXTENDED RELEASE ORAL
Qty: 120 TABLET | Refills: 0 | Status: CANCELLED | OUTPATIENT
Start: 2021-09-07

## 2021-09-07 RX ORDER — ONDANSETRON 4 MG/1
4 TABLET, ORALLY DISINTEGRATING ORAL EVERY 8 HOURS PRN
Qty: 10 TABLET | Refills: 0 | Status: CANCELLED | OUTPATIENT
Start: 2021-09-07

## 2021-09-07 RX ORDER — NYSTATIN 100000 U/G
CREAM TOPICAL 2 TIMES DAILY
Qty: 60 G | Refills: 2 | Status: CANCELLED | OUTPATIENT
Start: 2021-09-07

## 2021-09-07 RX ORDER — GABAPENTIN 300 MG/1
CAPSULE ORAL
Qty: 300 CAPSULE | Refills: 11 | Status: CANCELLED | OUTPATIENT
Start: 2021-09-07

## 2021-09-07 RX ORDER — DEXTROAMPHETAMINE SACCHARATE, AMPHETAMINE ASPARTATE, DEXTROAMPHETAMINE SULFATE AND AMPHETAMINE SULFATE 2.5; 2.5; 2.5; 2.5 MG/1; MG/1; MG/1; MG/1
TABLET ORAL
Qty: 30 TABLET | Refills: 0 | Status: CANCELLED | OUTPATIENT
Start: 2021-09-07

## 2021-09-07 RX ORDER — LISDEXAMFETAMINE DIMESYLATE 50 MG/1
CAPSULE ORAL
Qty: 30 CAPSULE | Refills: 0 | Status: CANCELLED | OUTPATIENT
Start: 2021-09-07

## 2021-09-07 ASSESSMENT — PAIN SCALES - GENERAL: PAINLEVEL: NO PAIN (0)

## 2021-09-07 NOTE — TELEPHONE ENCOUNTER
On September 7, 2021, at 3:52 PM, writer called patient at [phone] to confirm Virtual Visit. Writer unable to make contact with patient. Writer unable to leave detailed voice mail message due to voice mail box full. Debbie Ray, [facility]
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
## 2021-09-07 NOTE — PATIENT INSTRUCTIONS
-Continue on current medication regimen for now.  Refill request are sent to your primary care as she is still your restricted pt.    -Complete lab before next appt.    -Complete and return release of information to your  and [facility].    Your next appointment is scheduled on 10/5/2021 (Tue) at 2:30pm.     **For crisis resources, please see the information at the end of this document**     Patient Education      Thank you for coming to the [facility] [place] [facility].    Lab Testing:  If you had lab testing today and your results are reassuring or normal they will be mailed to you or sent through [employer] within 7 days. If the lab tests need quick action we will call you with the results. The phone number we will call with results is # [phone] (home) . If this is not the best number please call our clinic and change the number.    Medication Refills:  If you need any refills please call your pharmacy and they will contact us. Our fax number for refills is [phone]. Please allow three business for refill processing. If you need to  your refill at a new pharmacy, please contact the new pharmacy directly. The new pharmacy will help you get your medications transferred.     Scheduling:  If you have any concerns about today's visit or wish to schedule another appointment please call our office during normal business hours [phone] (8-5:00 M-F)    Contact Us:  Please call [phone] during business hours (8-5:00 M-F).  If after clinic hours, or on the weekend, please call  [phone].    Financial Assistance [phone]  [facility] Billing [phone]  Central Billing Office, [facility]: [phone]  [facility] Billing [phone]  Medical Records [phone]  [facility] Patient Bill of Rights [email or web address]       MENTAL HEALTH CRISIS NUMBERS:  For a medical emergency  please call  911 or go to the nearest ER.     [facility]:   [facility] -[phone]   Crisis Residence [facility] [place] [facility] -[phone]   Walk-In Counseling Center [facility] [place] -[phone]   COPE 24/7 [place] Mobile Team -[phone] (adults)/[phone] (child)  CHILD: [facility] needs assessment team - [phone]      [facility]:   [facility] - [phone]   Walk-in counseling [facility] - [phone]   Walk-in counseling [facility] - [phone]   Crisis Residence [facility] - [phone]  Urgent Care Adult Mental Zgfsxm-[phone] mobile unit/ 24/7 crisis line    National Crisis Numbers:   National Suicide Prevention Lifeline: 9-428-337-TALK (185-110-0555)  Poison Control Center - 4-551-217-2854  M-SIX/resources for a list of additional resources (SOS)  Trans Lifeline a hotline for transgender people 2-172-725-3240  The [employer] Project a hotline for LGBT youth 1-662.104.3639  Crisis Text Line: For any crisis 24/7   To: 511244  see www.crisistextline.org  - IF MAKING A CALL FEELS TOO HARD, send a text!         Again thank you for choosing [facility] [place] [facility] and please let us know how we can best partner with you to improve you and your family's health.    You may be receiving a survey regarding this appointment. We would love to have your feedback, both positive and negative. The survey is done by an external company, so your answers are anonymous.            negative - no pain, no limited range of motion

## 2021-09-10 ENCOUNTER — VIRTUAL VISIT (OUTPATIENT)
Dept: BEHAVIORAL HEALTH | Facility: CLINIC | Age: 31
End: 2021-09-10
Payer: COMMERCIAL

## 2021-09-10 ENCOUNTER — TELEPHONE (OUTPATIENT)
Dept: PSYCHIATRY | Facility: CLINIC | Age: 31
End: 2021-09-10

## 2021-09-10 DIAGNOSIS — F25.9 SCHIZOAFFECTIVE DISORDER, CHRONIC CONDITION (H): Primary | ICD-10-CM

## 2021-09-10 DIAGNOSIS — F41.1 GAD (GENERALIZED ANXIETY DISORDER): ICD-10-CM

## 2021-09-10 PROCEDURE — 90832 PSYTX W PT 30 MINUTES: CPT | Mod: 95 | Performed by: SOCIAL WORKER

## 2021-09-10 NOTE — PROGRESS NOTES
Progress Note    Patient Name: Ayana Prasad  Date: 9/10/2021       Service Type: Individual      Session Start Time: 3:04 Session End Time: 3:21     Session Length: 17 min    Session #: 14    Attendees: Client attended alone    Service Modality:  Video Visit:      Provider verified identity through the following two step process.  Patient provided:  Patient is known previously to provider    Telemedicine Visit: The patient's condition can be safely assessed and treated via synchronous audio and visual telemedicine encounter.      Reason for Telemedicine Visit: Patient has requested telehealth visit    Originating Site (Patient Location): Patient's home    lDistant Site (Provider Location): [facility]: [facility] and [place].    Consent:  The patient/guardian has verbally consented to: the potential risks and benefits of telemedicine (video visit) versus in person care; bill my insurance or make self-payment for services provided; and responsibility for payment of non-covered services.     Patient would like the video invitation sent by:  My Chart    Mode of Communication:  Video Conference via [employer]    As the provider I attest to compliance with applicable laws and regulations related to telemedicine.     Treatment Plan Last Reviewed: 7/16/2021  PHQ-9 / AVA-7 :     DATA  Interactive Complexity: No  Crisis: No       Progress Since Last Session (Related to Symptoms / Goals / Homework):   Symptoms: No change schizoaffective disorder  Less anxiety; auditory hallucinations continue but with less intensity.    Homework: Partially completed      Episode of Care Goals: Achieved / completed to satisfaction - CONTEMPLATION (Considering change and yet undecided); Intervened by assessing the negative and positive thinking (ambivalence) about behavior change    Current / Ongoing Stressors and Concerns:   Ayana reports illness due to getting tainted food at local  eatery.  She stated she was good and has been busy with friends. She did not want to complete entire session.          Treatment Objective(s) Addressed in This Session:    Use at least 2 coping skills for stress management in the next 2 weeks.      Intervention:       On hold this session    Motivational Interviewing: client-centered; Explore and resolve ambivalence to elicite behavior change. Support through exploration of motives to resolve of ambivalence to change.   Motivational interviewing to identify the thoughts and feelings that lead to continue anxiety and help her to develop new thought patterns to aid in symptom management/reduction.          ASSESSMENT: Current Emotional / Mental Status (status of significant symptoms):   Risk status (Self / Other harm or suicidal ideation)   Patient denies current fears or concerns for personal safety.   Patient denies current or recent suicidal ideation or behaviors.   Patient denies current or recent homicidal ideation or behaviors.   Patient denies current or recent self injurious behavior or ideation.   Patient denies other safety concerns.   Patient reports there has been no change in risk factors since their last session.     Patient reports there has been no change in protective factors since their last session.     A safety and risk management plan has been developed including: Patient consented to co-developed          safety plan.  Safety and risk management plan was completed.  Patient agreed to use safety plan                         should any safety concerns arise.  A copy was given to the patient.     Appearance:   Approprate   Eye Contact:   Poor   Psychomotor Behavior: Normal  Restless    Attitude:   Guarded  Indifferent Evasive   Orientation:   All   Speech    Rate / Production: Normal/ Responsive Normal     Volume:  Normal    Mood:    Depressed  Apathetic Ambivalent   Affect:    Blunted    Thought Content:  Delusions hears voices that are directive  to harm self or ignore      professionals.   Thought Form:  Coherent  Psychosis   Insight:    Good  and Fair      Medication Review:   No changes to current psychiatric medication(s)     Medication Compliance:   Yes     Changes in Health Issues:   None reported     Chemical Use Review:   Substance Use: Chemical use reviewed, no active concerns identified   History of meth use.                                             Last use 6 months ago.  Will begin harm reduction treatment wit [facility].     Tobacco Use: No change in amount of tobacco use since last session.  Reviewed information and     resources for quitting. Repeats every session. Vapping much of the day.     Diagnosis:  Schizoaffective disorder-Depressed      Collateral Reports Completed:   No release needs identified this session.    PLAN: (Patient Tasks / Therapist Tasks / Other)  Use stress management skills/activities;  Contact PCP is symptoms of food poison continue.      Continue to use distractions identified in sessions to reduce/manage anxiety and depression that leads to suicidal ideations. Walking pet, tech of relaxation; deep breathing, diversion activities.    Follow-up with psychiatry as scheduled. Follow safety plan. Follow up with providers at the [facility] including psychiatry.  Continue to engage in activities as able with friends. Continue to contact mother every day. Return 9/10/2021      Joana Hagen, [facility]   9/10/2021                                                       ______________________________________________________________________    Treatment Plan    Patient's Name: Ayana Prasad  YOB: 1990    Date: 7/16/2021    DSM5 Diagnoses: 295.70  (F25.1) Schizoaffective Disorder Depressive Type  Psychosocial / Contextual Factors: in group home; relationship issues.    WHODAS: 22    Referral / Collaboration:  No referral or collaboration needs identified this sessions.     Anticipated number of  session or this episode of care: 24+      MeasurableTreatment Goal(s) related to diagnosis / functional impairment(s)  Goal 1:  Decrease or alleviate symptoms of depression.   I will know I've met my goal when I no longer have thoughts of suicide.    Objective #A Patient will Decrease frequency and intensity of feeling down, depressed, hopeless  Improve quantity and quality of night time sleep / decrease daytime naps  Identify negative self-talk and behaviors: challenge core beliefs, myths, and actions  Decrease thoughts that you'd be better off dead or of suicide / self-harm.   Patient will follow her safety plan; using skills to manage symptoms    Status: Restarted - Date: 7/16/2021     Intervention(s)  Therapist will provide client centered therapy and DBT: mindfulness and emotional regulation..   Patient will follow her safety plan; using skills to manage symptoms    Goal 2: Reduce/manage anxiety    I will know I've met my goal when I can recognize and mange anxiety.      Objective #A Patient will identify at least 3 triggers for anxiety.    Status: Restarted - Date: 7/16/2021       Intervention(s)  Therapist will provide CBT: identify negative self defeating thoughts that exacerbate anxiousness and lead to suicidal ideations.      Patient has reviewed and agreed to the above plan.      Joana Hagen, [facility]  July 16, 2021

## 2021-09-10 NOTE — TELEPHONE ENCOUNTER
On 9/9/2021 3 faxed pages were received from [facility] requesting a current med list as well as an AIMS Assessment. VAL already on file. Writer printed current med list and noted which medications are prescribed through Psychiatry clinic. This was routed to Jaelyn Mascorro LPN.  CHRISTOPHER Clayton    On 9/14/2021, 7 pages including the requested med list were faxed to [facility] at [phone]. Karl Canada EMT

## 2021-09-13 ENCOUNTER — VIRTUAL VISIT (OUTPATIENT)
Dept: FAMILY MEDICINE | Facility: CLINIC | Age: 31
End: 2021-09-13
Payer: COMMERCIAL

## 2021-09-13 DIAGNOSIS — F90.2 ATTENTION DEFICIT HYPERACTIVITY DISORDER (ADHD), COMBINED TYPE: ICD-10-CM

## 2021-09-13 DIAGNOSIS — M54.9 INTRACTABLE BACK PAIN: ICD-10-CM

## 2021-09-13 DIAGNOSIS — F41.9 ANXIETY: ICD-10-CM

## 2021-09-13 DIAGNOSIS — F25.0 SCHIZOAFFECTIVE DISORDER, BIPOLAR TYPE (H): ICD-10-CM

## 2021-09-13 DIAGNOSIS — A08.4 VIRAL GASTROENTERITIS: Primary | ICD-10-CM

## 2021-09-13 DIAGNOSIS — F31.11 BIPOLAR 1 DISORDER, MANIC, MILD (H): ICD-10-CM

## 2021-09-13 PROCEDURE — 99214 OFFICE O/P EST MOD 30 MIN: CPT | Mod: 95 | Performed by: NURSE PRACTITIONER

## 2021-09-13 RX ORDER — DEXTROAMPHETAMINE SACCHARATE, AMPHETAMINE ASPARTATE, DEXTROAMPHETAMINE SULFATE AND AMPHETAMINE SULFATE 2.5; 2.5; 2.5; 2.5 MG/1; MG/1; MG/1; MG/1
TABLET ORAL
Qty: 30 TABLET | Refills: 0 | Status: ON HOLD | OUTPATIENT
Start: 2021-09-13 | End: 2021-11-27

## 2021-09-13 RX ORDER — ONDANSETRON 4 MG/1
4 TABLET, ORALLY DISINTEGRATING ORAL EVERY 8 HOURS PRN
Qty: 10 TABLET | Refills: 0 | Status: SHIPPED | OUTPATIENT
Start: 2021-09-13 | End: 2022-01-16

## 2021-09-13 RX ORDER — GABAPENTIN 300 MG/1
CAPSULE ORAL
Qty: 300 CAPSULE | Refills: 0 | Status: ON HOLD | OUTPATIENT
Start: 2021-09-13 | End: 2021-12-09

## 2021-09-13 RX ORDER — LISDEXAMFETAMINE DIMESYLATE 50 MG/1
CAPSULE ORAL
Qty: 30 CAPSULE | Refills: 0 | Status: SHIPPED | OUTPATIENT
Start: 2021-09-13 | End: 2021-10-28

## 2021-09-13 RX ORDER — LITHIUM CARBONATE 300 MG/1
TABLET, FILM COATED, EXTENDED RELEASE ORAL
Qty: 120 TABLET | Refills: 0 | Status: SHIPPED | OUTPATIENT
Start: 2021-09-13 | End: 2021-10-08

## 2021-09-13 RX ORDER — CLONAZEPAM 1 MG/1
1 TABLET ORAL 2 TIMES DAILY PRN
Qty: 14 TABLET | Refills: 0 | Status: SHIPPED | OUTPATIENT
Start: 2021-09-13 | End: 2021-09-20

## 2021-09-13 ASSESSMENT — PAIN SCALES - GENERAL: PAINLEVEL: NO PAIN (0)

## 2021-09-13 ASSESSMENT — ENCOUNTER SYMPTOMS
DECREASED CONCENTRATION: 1
DYSPHORIC MOOD: 1
HALLUCINATIONS: 1
NERVOUS/ANXIOUS: 1

## 2021-09-13 NOTE — PROGRESS NOTES
Prakash is a 31 year old who is being evaluated via a billable video visit.      How would you like to obtain your AVS? MyChart  If the video visit is dropped, the invitation should be resent by: Text to cell phone: [phone]  Will anyone else be joining your video visit? No  Video Start Time: 1:17 PM    Assessment & Plan     Attention deficit hyperactivity disorder (ADHD), combined type  Refill provided.  We will have office staff complete restricted provider form to give psychiatry ability to prescribe  - amphetamine-dextroamphetamine (ADDERALL) 10 MG tablet; TAKE 1 TABLET BY MOUTH EVERY AFTERNOON  - lisdexamfetamine (VYVANSE) 50 MG capsule; TAKE 1 CAPSULE BY MOUTH EVERY MORNING    Schizoaffective disorder, bipolar type (H)  Refill provided.  We will have office staff complete restricted provider form to give psychiatry ability to prescribe  - clonazePAM (KLONOPIN) 1 MG tablet; Take 1 tablet (1 mg) by mouth 2 times daily as needed for anxiety  - lithium ER (LITHOBID) 300 MG CR tablet; Take one tab (1) each morning and three (3) tabs at bedtime    Bipolar 1 disorder, manic, mild  Refill provided.  We will have office staff complete restricted provider form to give psychiatry ability to prescribe  - gabapentin (NEURONTIN) 300 MG capsule; TAKE 3 CAPSULES (900MG) AND TAKE 4 CAPSULES (1200MG ) BY MOUTH MIDDAY DAY AND TAKE 3 CAPSULES (900MG) BY MOUTH EVERY EVENING    Anxiety  Refill provided.  We will have office staff complete restricted provider form to give psychiatry ability to prescribe  - gabapentin (NEURONTIN) 300 MG capsule; TAKE 3 CAPSULES (900MG) AND TAKE 4 CAPSULES (1200MG ) BY MOUTH MIDDAY DAY AND TAKE 3 CAPSULES (900MG) BY MOUTH EVERY EVENING    Intractable back pain  Refill provided.  We will have office staff complete restricted provider form to give psychiatry ability to prescribe  - gabapentin (NEURONTIN) 300 MG capsule; TAKE 3 CAPSULES (900MG) AND TAKE 4 CAPSULES (1200MG ) BY MOUTH MIDDAY DAY AND  "TAKE 3 CAPSULES (900MG) BY MOUTH EVERY EVENING    Viral gastroenteritis  Push fluids  Clear liquids and gradually increase diet as tolerated  Notify if continues longer that 72 hours  Rest   Good handwashing   Call or return:  Persistent vomiting   Abdominal pain  If symptoms do not resolve in the next 2-3 days  Any new or worsening symptoms   - ondansetron (ZOFRAN ODT) 4 MG ODT tab; Take 1 tablet (4 mg) by mouth every 8 hours as needed    28 minutes spent on the date of the encounter doing chart review, history and exam, documentation and further activities per the note     BMI:   Estimated body mass index is 37.54 kg/m  as calculated from the following:    Height as of 5/24/21: 1.676 m (5' 6\").    Weight as of 7/21/21: 105.5 kg (232 lb 9.6 oz).       No follow-ups on file.    BROOKLYN Cruz CNP  [facility] SHAILESH Randall is a 31 year old who presents for the following health issues     HPI     Medication Followup of Vvanse, Adderall, Lithium, Gabapentin, Klonopin    Taking Medication as prescribed: yes    Side Effects:  None    Medication Helping Symptoms:  yes       Needs to have refills of psych medications. Continues to see psych. Is part of restricted program. Has not filled out restricted forms in order to give psych provider or gender care provider authorization to prescribe. Has been taking all medications for some time and feels like things are going okay. No thoughts of harming self or others. Voices are quiet today. Denies marijuana, alcohol or other illicit drug use.    Had first COVID vaccine in July. Did not come for second one when was due. Needs okay to get second vaccine    Has been vomiting and having diarrhea. Has been having a couple of diarrhea stools per day. No change in dietary habits. Has been trying to eat bland foods. No one else in home is ill. Fever off and on. Has been tested for COVID and it is negative. Today nothing to eat or drink. No abdomen " pain. Does not feel thirsty. Not taking meds.     Review of Systems   Gastrointestinal: Positive for vomiting.   Psychiatric/Behavioral: Positive for decreased concentration, dysphoric mood and hallucinations (auditory). The patient is nervous/anxious.           Objective    Vitals - Patient Reported  Pain Score: No Pain (0)      Vitals:  No vitals were obtained today due to virtual visit.    Physical Exam  Constitutional:       General: He is not in acute distress.     Appearance: Normal appearance. He is not toxic-appearing.   HENT:      Nose: No congestion.   Eyes:      General: Lids are normal.   Pulmonary:      Effort: Pulmonary effort is normal. No tachypnea, bradypnea or respiratory distress.   Skin:     Coloration: Skin is not ashen, cyanotic, jaundiced or pale.   Neurological:      Mental Status: He is alert.   Psychiatric:         Mood and Affect: Mood normal.         Speech: Speech normal.         Behavior: Behavior normal. Behavior is cooperative.              Video-Visit Details    Type of service:  Video Visit    Video End Time:1:33 PM    Originating Location (pt. Location): Home    Distant Location (provider location):  [facility]     Platform used for Video Visit: [email or web address]

## 2021-09-14 ENCOUNTER — TELEPHONE (OUTPATIENT)
Dept: FAMILY MEDICINE | Facility: CLINIC | Age: 31
End: 2021-09-14

## 2021-09-14 ENCOUNTER — PRE VISIT (OUTPATIENT)
Dept: SURGERY | Facility: CLINIC | Age: 31
End: 2021-09-14

## 2021-09-14 ENCOUNTER — OFFICE VISIT (OUTPATIENT)
Dept: PLASTIC SURGERY | Facility: CLINIC | Age: 31
End: 2021-09-14
Payer: COMMERCIAL

## 2021-09-14 VITALS
WEIGHT: 235 LBS | DIASTOLIC BLOOD PRESSURE: 92 MMHG | SYSTOLIC BLOOD PRESSURE: 128 MMHG | HEART RATE: 110 BPM | HEIGHT: 66 IN | TEMPERATURE: 98.8 F | BODY MASS INDEX: 37.77 KG/M2 | OXYGEN SATURATION: 93 %

## 2021-09-14 DIAGNOSIS — F64.0 GENDER DYSPHORIA IN ADOLESCENT AND ADULT: Primary | ICD-10-CM

## 2021-09-14 PROCEDURE — 99203 OFFICE O/P NEW LOW 30 MIN: CPT | Performed by: SURGERY

## 2021-09-14 ASSESSMENT — MIFFLIN-ST. JEOR: SCORE: 1797.7

## 2021-09-14 ASSESSMENT — ENCOUNTER SYMPTOMS: VOMITING: 1

## 2021-09-14 ASSESSMENT — PAIN SCALES - GENERAL: PAINLEVEL: NO PAIN (0)

## 2021-09-14 NOTE — TELEPHONE ENCOUNTER
Ayana from [facility] is calling for additional information regarding patient's appointment with  for plastic surgery reconstruction. What is needed is the address & facility.   Patient has provided the information of: [facility] [address], [place], he will be calling back with a phone number if he can find one. This information was left on the confidential VM of Ayana at [facility] with return phone # for .

## 2021-09-14 NOTE — LETTER
"9/14/2021       RE: Ayana Prasad  [address] [id number]     Dear Colleague,    Thank you for referring your patient, Ayana Prasad, to the [facility] [place] [facility]. Please see a copy of my visit note below.    PLASTICS NEW TOP   HPI: This is a 31 year old biological female transitioning to male with a history of gender dysphoria, ADHD and schizoaffective disorder who presents today by himself for a consultation for top surgery. His pronouns are he/him and his preferred name is Prakash. He found us through Internet research and made his appointment 4-5 months ago. His therapist is Dora Mazariegos and he has been seeing this therapist since March. She has already written the patient a letter of support and apparently sent it over [employer]Positive. Instructed patient to send the letter to us via [employer]Positive He has been on testosterone for 6 months (SubQ injections Qweek), administered by Dr. Juan at [facility]. He has been living his chosen gender identity/role for 9 months. He does not bind. No breast lumps, skin puckering, nipple drainage, or other breast problems. No history of breast imaging, including mammogram or breast ultrasound.     Patient is in a \"restricted provider program\", patient states that he had something filled out for him to come to this clinic today.    Medical Hx: Gender dysphoria. No history of asthma, diabetes mellitus, or GERD. No bleeding, clotting, healing or scarring problems.  Anxiety - managed with medication.   PTSD  ADHD  Schizoaffective disorder. Takes medication, prescribed by psychiatrist Regine Last.   Obesity    Surgical Hx:   Laparoscopic cholecystectomy-2014  Kidney stone removal - recurrent  Cauda Equina syndrome - 2016  No complications with anesthesia, apart from vomiting.     Family Hx: No family history of breast or ovarian cancer.  MGM and MGF had " "diabetes. MGF had prostate CA and MGM had non-Hodgkin's lymphoma.     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 Patient is a full time student, getting his second Bachelor's degree (has a degree in Biology) Relationship status/family: Lives in  [facility] in [place]. Lives with 5 other residents. Patient is planning on staying with a friend for them to do postop cares.  Smoking status: Vapes nicotine. Informed patient that he needs to stop consuming all forms of nicotine 1 month before and 1 month after surgery. Vaping is \"pretty much constant.\" Alcohol use: None. Diet: Omnivore. Drinks protein shakes. No restrictions. Caffeine: Rare, occasional soda. Exercise: None. Sleep: Averages about 4 hours. Has a hard time falling asleep. Started taking Seroquel. States that its not helping, only making him gain weight.     PE: General: Height: 5' 6\"  Weight: 235lbs. Nervous leg \"bouncing\" during the entire visit.  Chest:   Nice upper chest contour.   Mild central depression.   Grade 3 nipple ptosis.   Minimal striae.   Breasts situated fairly close together.   R breast is slightly larger than the L.  Both breasts 600-700g.   IMFs situated about 2-3cms below the pec muscle.   L IMF situated about 1cm lower than the R.   Moderate lateral thoracic rolls.   Mild anterior axillary folds.    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   Photos taken with consent.     A&P: 31 year old biological female transitioning to male who is a good candidate for gender affirming top surgery with one of the following: bilateral simple mastectomy vs. breast reduction, +/- possible nipple graft reconstruction. He will most likely need a bilateral simple mastectomy with nipple graft reconstruction depending on intraoperative findings and the patient's desired outcome. The patient is interested in nipple grafts. The " patient will need a pre-op mammogram in addition to an H&P from their PCP.     Instructed patient to stop consuming nicotine at least 1 month pre and post op. Discussed alternatives such as vaping without nicotine.    He did not meet with our Transgender Coordinator but they will be in contact via [employer] to discuss communications with staff and timeline. He did receive an introductory folder of information and contact numbers/names. Any further discussion of risks and complications will be reviewed during the pre-op visit.    Patient accepts the risks of this procedure and would like to proceed with surgery. He is able to give informed consent for this medically necessary procedure.   Once we receive his therapist letter of support and mammogram results we will initiate the prior authorization process.     According to [place] Case Law and [facility] standards of care, with an appropriate letter of support from a mental health provider, top surgery/mastectomy is medically necessary for the treatment of gender dysphoria.     Total time = 30 minutes, spent on the date of encounter doing chart review, history and physical, dressing changes, documentation, patient education, and any further activity as noted above.     This note was prepared on behalf of Pretty Mayers MD by Corina Salmeron, a trained medical scribe, based on my observations and the provider's statements to me.       Again, thank you for allowing me to participate in the care of your patient.      Sincerely,    Pretty Mayers MD

## 2021-09-14 NOTE — PROGRESS NOTES
"PLASTICS NEW TOP   HPI: This is a 31 year old biological female transitioning to male with a history of gender dysphoria, ADHD and schizoaffective disorder who presents today by himself for a consultation for top surgery. His pronouns are he/him and his preferred name is Prakash. He found us through Internet research and made his appointment 4-5 months ago. His therapist is Dora Mazariegos and he has been seeing this therapist since March. She has already written the patient a letter of support and apparently sent it over [employer]. Instructed patient to send the letter to us via [employer] He has been on testosterone for 6 months (SubQ injections Qweek), administered by Dr. Juan at [facility]. He has been living his chosen gender identity/role for 9 months. He does not bind. No breast lumps, skin puckering, nipple drainage, or other breast problems. No history of breast imaging, including mammogram or breast ultrasound.     Patient is in a \"restricted provider program\", patient states that he had something filled out for him to come to this clinic today.    Medical Hx: Gender dysphoria. No history of asthma, diabetes mellitus, or GERD. No bleeding, clotting, healing or scarring problems.  Anxiety - managed with medication.   PTSD  ADHD  Schizoaffective disorder. Takes medication, prescribed by psychiatrist Regine Last.   Obesity    Surgical Hx:   Laparoscopic cholecystectomy-2014  Kidney stone removal - recurrent  Cauda Equina syndrome - 2016  No complications with anesthesia, apart from vomiting.     Family Hx: No family history of breast or ovarian cancer.  MGM and MGF had diabetes. MGF had prostate CA and MGM had non-Hodgkin's lymphoma.     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 Patient is a full time student, getting his second Bachelor's degree (has a degree in Biology) Relationship status/family: Lives in  [facility] in [place]. Lives " "with 5 other residents. Patient is planning on staying with a friend for them to do postop cares.  Smoking status: Vapes nicotine. Informed patient that he needs to stop consuming all forms of nicotine 1 month before and 1 month after surgery. Vaping is \"pretty much constant.\" Alcohol use: None. Diet: Omnivore. Drinks protein shakes. No restrictions. Caffeine: Rare, occasional soda. Exercise: None. Sleep: Averages about 4 hours. Has a hard time falling asleep. Started taking Seroquel. States that its not helping, only making him gain weight.     PE: General: Height: 5' 6\"  Weight: 235lbs. Nervous leg \"bouncing\" during the entire visit.  Chest:   Nice upper chest contour.   Mild central depression.   Grade 3 nipple ptosis.   Minimal striae.   Breasts situated fairly close together.   R breast is slightly larger than the L.  Both breasts 600-700g.   IMFs situated about 2-3cms below the pec muscle.   L IMF situated about 1cm lower than the R.   Moderate lateral thoracic rolls.   Mild anterior axillary folds.    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   Photos taken with consent.     A&P: 31 year old biological female transitioning to male who is a good candidate for gender affirming top surgery with one of the following: bilateral simple mastectomy vs. breast reduction, +/- possible nipple graft reconstruction. He will most likely need a bilateral simple mastectomy with nipple graft reconstruction depending on intraoperative findings and the patient's desired outcome. The patient is interested in nipple grafts. The patient will need a pre-op mammogram in addition to an H&P from their PCP.     Instructed patient to stop consuming nicotine at least 1 month pre and post op. Discussed alternatives such as vaping without nicotine.    He did not meet with our Transgender Coordinator but they will be in contact via [employer] to discuss communications with staff and " timeline. He did receive an introductory folder of information and contact numbers/names. Any further discussion of risks and complications will be reviewed during the pre-op visit.    Patient accepts the risks of this procedure and would like to proceed with surgery. He is able to give informed consent for this medically necessary procedure.   Once we receive his therapist letter of support and mammogram results we will initiate the prior authorization process.     According to [place] Case Law and [facility] standards of care, with an appropriate letter of support from a mental health provider, top surgery/mastectomy is medically necessary for the treatment of gender dysphoria.     Total time = 30 minutes, spent on the date of encounter doing chart review, history and physical, dressing changes, documentation, patient education, and any further activity as noted above.     This note was prepared on behalf of Pertty Mayers MD by Corina Salmeron, a trained medical scribe, based on my observations and the provider's statements to me.

## 2021-09-14 NOTE — NURSING NOTE
"Chief Complaint   Patient presents with     Consult     Prakash, is being seen today for a consult.       Vitals:    09/14/21 1404   BP: (!) 128/92   BP Location: Left arm   Patient Position: Chair   Cuff Size: Adult Large   Pulse: 110   Temp: 98.8  F (37.1  C)   TempSrc: Oral   SpO2: 93%   Weight: 106.6 kg (235 lb)   Height: 1.676 m (5' 6\")       Body mass index is 37.93 kg/m .      Kristin Meier LPN    "

## 2021-09-15 ENCOUNTER — TELEPHONE (OUTPATIENT)
Dept: FAMILY MEDICINE | Facility: CLINIC | Age: 31
End: 2021-09-15

## 2021-09-15 DIAGNOSIS — Z12.31 ENCOUNTER FOR SCREENING MAMMOGRAM FOR BREAST CANCER: Primary | ICD-10-CM

## 2021-09-15 NOTE — TELEPHONE ENCOUNTER
Message left for are Restricted Recipient Program. We need to verify if recent referrals have been approved. Referrals were placed for gender care, plastic and reconstructive surgery, and psychiatry.   Cassia Price, ASHLYN

## 2021-09-15 NOTE — TELEPHONE ENCOUNTER
Spoke to Ayana at [facility], the following referrals are in place for the RR  Program:     Neurosurgery: Georgina Smith NP  Plastic Reconstructive Surgery: Dr. Pretty Mayers   [facility]: Dr.Peter Linton for injections and Glenda Juan, Resident  Psych for [facility]:Harvey Negron,   [facility]: SUSANNAH CalvinI to MA & Provider, encounter may be closed if not needed.

## 2021-09-15 NOTE — TELEPHONE ENCOUNTER
Message left for Ayana at [facility]. If more information is needed she can call [phone].

## 2021-09-16 RX ORDER — ESCITALOPRAM OXALATE 10 MG/1
TABLET ORAL
Qty: 90 TABLET | Refills: 0 | Status: SHIPPED | OUTPATIENT
Start: 2021-09-16 | End: 2021-11-24

## 2021-09-16 NOTE — TELEPHONE ENCOUNTER
Routing refill request to provider for review/approval because:  A break in medication  escitalopram (LEXAPRO) 10 MG tablet  Last Written Prescription Date:  5/18/21  Last Fill Quantity: 30,  # refills: 0   Last office visit: 7/21/2021 with prescribing provider:  Virtual visit with Gena on 9/13/21   Future Office Visit:

## 2021-09-17 NOTE — TELEPHONE ENCOUNTER
A message has been left on this line for clinic NPI # : [id number]    Surgery Clinic - Plastic and Reconstructive Surgery  [facility] - [address] [address]  Appointments:  [phone]

## 2021-09-20 ENCOUNTER — MYC REFILL (OUTPATIENT)
Dept: FAMILY MEDICINE | Facility: CLINIC | Age: 31
End: 2021-09-20

## 2021-09-20 ENCOUNTER — TELEPHONE (OUTPATIENT)
Dept: FAMILY MEDICINE | Facility: CLINIC | Age: 31
End: 2021-09-20

## 2021-09-20 DIAGNOSIS — F25.0 SCHIZOAFFECTIVE DISORDER, BIPOLAR TYPE (H): ICD-10-CM

## 2021-09-20 NOTE — TELEPHONE ENCOUNTER
Requested Prescriptions   Pending Prescriptions Disp Refills     clonazePAM (KLONOPIN) 1 MG tablet 14 tablet 0     Sig: Take 1 tablet (1 mg) by mouth 2 times daily as needed for anxiety       There is no refill protocol information for this order        Routing refill request to provider for review/approval because:  Drug not on the [facility] refill protocol

## 2021-09-20 NOTE — TELEPHONE ENCOUNTER
Reason for call:  Patient reporting a symptom DIAHHREA    Omocola, her caregiver is calling regarding  Ayana having diahhrea for a week    Duration (how long have symptoms been present): diahhrea for a week.with no other symptoms.     Have you been treated for this before? no    Phone Number patient can be reached at:  Home number on file [phone] (home)  any    Can we leave a detailed message on this number:  YES    Call taken on 9/20/2021 at 12:02 PM by Lissett Jose

## 2021-09-20 NOTE — TELEPHONE ENCOUNTER
Called patient and assisted with setting up a video visit this week. Patient stated they are staying hydrated and able to keep food and liquids down.    Kristin Machado RN

## 2021-09-21 ENCOUNTER — TELEPHONE (OUTPATIENT)
Dept: FAMILY MEDICINE | Facility: CLINIC | Age: 31
End: 2021-09-21

## 2021-09-21 RX ORDER — CLONAZEPAM 1 MG/1
1 TABLET ORAL 2 TIMES DAILY PRN
Qty: 14 TABLET | Refills: 0 | Status: SHIPPED | OUTPATIENT
Start: 2021-09-21 | End: 2021-09-30

## 2021-09-21 NOTE — TELEPHONE ENCOUNTER
Forms received from: [facility]   Phone number listed: [phone]   Fax listed: [phone]  Date received: 09/21/2021  Form description: recert order   Once forms are completed, please return to [facility] via fax.  Form placed: in providers in mirian Shipman

## 2021-09-22 ENCOUNTER — VIRTUAL VISIT (OUTPATIENT)
Dept: FAMILY MEDICINE | Facility: CLINIC | Age: 31
End: 2021-09-22
Payer: COMMERCIAL

## 2021-09-22 DIAGNOSIS — R19.7 DIARRHEA, UNSPECIFIED TYPE: Primary | ICD-10-CM

## 2021-09-22 PROCEDURE — 99213 OFFICE O/P EST LOW 20 MIN: CPT | Mod: 95 | Performed by: NURSE PRACTITIONER

## 2021-09-22 ASSESSMENT — ENCOUNTER SYMPTOMS
CHILLS: 0
DIARRHEA: 1
FEVER: 0
DIZZINESS: 0
COUGH: 0
FATIGUE: 0
CONSTIPATION: 0
EYE ITCHING: 0
DIAPHORESIS: 0
SINUS PRESSURE: 0
SHORTNESS OF BREATH: 0
SORE THROAT: 0
EYE DISCHARGE: 0
HEADACHES: 0
RHINORRHEA: 0
NAUSEA: 0
WHEEZING: 0
LIGHT-HEADEDNESS: 0

## 2021-09-24 ENCOUNTER — DOCUMENTATION ONLY (OUTPATIENT)
Dept: BEHAVIORAL HEALTH | Facility: CLINIC | Age: 31
End: 2021-09-24

## 2021-09-24 NOTE — PROGRESS NOTES
Email to Prakash, re: missed appointment on 9/24/2021 at 3pm    From: Joana Hagen   Sent: Friday, September 24, 2021 3:15 PM  To: [email or web address]  Subject: Secure    Jarad Randall,  I hope this finds you well.    I missed you today for our 3pm appointment. Please call [phone] to reschedule.  Chapincito Oscar

## 2021-09-26 ENCOUNTER — HEALTH MAINTENANCE LETTER (OUTPATIENT)
Age: 31
End: 2021-09-26

## 2021-09-27 NOTE — TELEPHONE ENCOUNTER
Received signed releases from pt in 9/26/21 [employer] encounter. Coordinated with provider, who requested that [employer] request for records be sent out.    Writer faxed release to [place] at [phone] via Epic communication.

## 2021-09-30 ENCOUNTER — TELEPHONE (OUTPATIENT)
Dept: NURSING | Facility: CLINIC | Age: 31
End: 2021-09-30

## 2021-09-30 NOTE — TELEPHONE ENCOUNTER
Telephone call  Home care nurse calling to schedule 2nd vaccine for patient but they did not have the  Lot numbers. They will call back when they find them  Casandra Eaton RN   [facility] Nurse Advisor  2:28 PM 9/30/2021

## 2021-10-01 ENCOUNTER — TELEPHONE (OUTPATIENT)
Dept: PSYCHIATRY | Facility: CLINIC | Age: 31
End: 2021-10-01

## 2021-10-01 NOTE — TELEPHONE ENCOUNTER
See 9/30/21 MyChart refill encounter for resolution.  Writer called pt and identified that the refill was sent.

## 2021-10-01 NOTE — TELEPHONE ENCOUNTER
[facility] Call Center    Phone Message    May a detailed message be left on voicemail: yes     Reason for Call: Medication Refill Request    Has the patient contacted the pharmacy for the refill? Yes   Name of medication being requested: indra  Provider who prescribed the medication: Regine Last  [facility]  Date medication is needed: ASAP. Pt ran out today. Pt's appt on 10/5 had to be cancelled due to clinician being out of the office. Appt was rescheduled to 10/28/21.          Action Taken: Other: [facility]    Travel Screening: Not Applicable

## 2021-10-06 ENCOUNTER — IMMUNIZATION (OUTPATIENT)
Dept: NURSING | Facility: CLINIC | Age: 31
End: 2021-10-06
Attending: NURSE PRACTITIONER
Payer: COMMERCIAL

## 2021-10-06 DIAGNOSIS — F25.0 SCHIZOAFFECTIVE DISORDER, BIPOLAR TYPE (H): ICD-10-CM

## 2021-10-06 DIAGNOSIS — F64.9 GENDER DYSPHORIA: ICD-10-CM

## 2021-10-06 DIAGNOSIS — F90.2 ATTENTION DEFICIT HYPERACTIVITY DISORDER (ADHD), COMBINED TYPE: ICD-10-CM

## 2021-10-06 DIAGNOSIS — R11.2 NON-INTRACTABLE VOMITING WITH NAUSEA, UNSPECIFIED VOMITING TYPE: ICD-10-CM

## 2021-10-06 LAB
ALBUMIN SERPL-MCNC: 3.5 G/DL (ref 3.4–5)
ALBUMIN UR-MCNC: NEGATIVE MG/DL
ALP SERPL-CCNC: 82 U/L (ref 40–150)
ALT SERPL W P-5'-P-CCNC: 62 U/L (ref 0–70)
ANION GAP SERPL CALCULATED.3IONS-SCNC: 7 MMOL/L (ref 3–14)
APPEARANCE UR: CLEAR
AST SERPL W P-5'-P-CCNC: 44 U/L (ref 0–45)
BASOPHILS # BLD AUTO: 0.1 10E3/UL (ref 0–0.2)
BASOPHILS NFR BLD AUTO: 1 %
BILIRUB SERPL-MCNC: 0.3 MG/DL (ref 0.2–1.3)
BILIRUB UR QL STRIP: NEGATIVE
BUN SERPL-MCNC: 12 MG/DL (ref 7–30)
CALCIUM SERPL-MCNC: 9 MG/DL (ref 8.5–10.1)
CHLORIDE BLD-SCNC: 108 MMOL/L (ref 94–109)
CO2 SERPL-SCNC: 21 MMOL/L (ref 20–32)
COLOR UR AUTO: ABNORMAL
CREAT SERPL-MCNC: 0.77 MG/DL (ref 0.52–1.25)
EOSINOPHIL # BLD AUTO: 0.3 10E3/UL (ref 0–0.7)
EOSINOPHIL NFR BLD AUTO: 3 %
ERYTHROCYTE [DISTWIDTH] IN BLOOD BY AUTOMATED COUNT: 14.6 % (ref 10–15)
GFR SERPL CREATININE-BSD FRML MDRD: >90 ML/MIN/1.73M2
GLUCOSE BLD-MCNC: 253 MG/DL (ref 70–99)
GLUCOSE UR STRIP-MCNC: >=1000 MG/DL
HCT VFR BLD AUTO: 40.8 % (ref 35–53)
HGB BLD-MCNC: 13.1 G/DL (ref 11.7–17.7)
HGB UR QL STRIP: ABNORMAL
IMM GRANULOCYTES # BLD: 0.1 10E3/UL
IMM GRANULOCYTES NFR BLD: 1 %
KETONES UR STRIP-MCNC: NEGATIVE MG/DL
LEUKOCYTE ESTERASE UR QL STRIP: ABNORMAL
LIPASE SERPL-CCNC: 240 U/L (ref 73–393)
LYMPHOCYTES # BLD AUTO: 3.2 10E3/UL (ref 0.8–5.3)
LYMPHOCYTES NFR BLD AUTO: 26 %
MCH RBC QN AUTO: 26.3 PG (ref 26.5–33)
MCHC RBC AUTO-ENTMCNC: 32.1 G/DL (ref 31.5–36.5)
MCV RBC AUTO: 82 FL (ref 78–100)
MONOCYTES # BLD AUTO: 0.7 10E3/UL (ref 0–1.3)
MONOCYTES NFR BLD AUTO: 6 %
NEUTROPHILS # BLD AUTO: 7.9 10E3/UL (ref 1.6–8.3)
NEUTROPHILS NFR BLD AUTO: 63 %
NITRATE UR QL: NEGATIVE
NRBC # BLD AUTO: 0 10E3/UL
NRBC BLD AUTO-RTO: 0 /100
PH UR STRIP: 6.5 [PH] (ref 5–7)
PLATELET # BLD AUTO: 347 10E3/UL (ref 150–450)
POTASSIUM BLD-SCNC: 4.2 MMOL/L (ref 3.4–5.3)
PROT SERPL-MCNC: 7.7 G/DL (ref 6.8–8.8)
RBC # BLD AUTO: 4.99 10E6/UL (ref 3.8–5.9)
RBC URINE: >182 /HPF
SODIUM SERPL-SCNC: 136 MMOL/L (ref 133–144)
SP GR UR STRIP: 1.01 (ref 1–1.03)
UROBILINOGEN UR STRIP-MCNC: NORMAL MG/DL
WBC # BLD AUTO: 12.4 10E3/UL (ref 4–11)
WBC URINE: 82 /HPF

## 2021-10-06 PROCEDURE — 81001 URINALYSIS AUTO W/SCOPE: CPT | Performed by: EMERGENCY MEDICINE

## 2021-10-06 PROCEDURE — 83690 ASSAY OF LIPASE: CPT | Performed by: EMERGENCY MEDICINE

## 2021-10-06 PROCEDURE — 0012A PR COVID VAC MODERNA 100 MCG/0.5 ML IM: CPT

## 2021-10-06 PROCEDURE — 91301 PR COVID VAC MODERNA 100 MCG/0.5 ML IM: CPT

## 2021-10-06 PROCEDURE — 96374 THER/PROPH/DIAG INJ IV PUSH: CPT

## 2021-10-06 PROCEDURE — 250N000011 HC RX IP 250 OP 636: Performed by: EMERGENCY MEDICINE

## 2021-10-06 PROCEDURE — 36415 COLL VENOUS BLD VENIPUNCTURE: CPT | Performed by: EMERGENCY MEDICINE

## 2021-10-06 PROCEDURE — 80053 COMPREHEN METABOLIC PANEL: CPT | Performed by: EMERGENCY MEDICINE

## 2021-10-06 PROCEDURE — 87086 URINE CULTURE/COLONY COUNT: CPT | Performed by: EMERGENCY MEDICINE

## 2021-10-06 PROCEDURE — 83036 HEMOGLOBIN GLYCOSYLATED A1C: CPT | Performed by: EMERGENCY MEDICINE

## 2021-10-06 PROCEDURE — 85025 COMPLETE CBC W/AUTO DIFF WBC: CPT | Performed by: EMERGENCY MEDICINE

## 2021-10-06 PROCEDURE — 99285 EMERGENCY DEPT VISIT HI MDM: CPT | Mod: 25

## 2021-10-06 PROCEDURE — 81025 URINE PREGNANCY TEST: CPT | Performed by: EMERGENCY MEDICINE

## 2021-10-06 PROCEDURE — 96361 HYDRATE IV INFUSION ADD-ON: CPT

## 2021-10-06 PROCEDURE — 85004 AUTOMATED DIFF WBC COUNT: CPT | Performed by: EMERGENCY MEDICINE

## 2021-10-06 PROCEDURE — 258N000003 HC RX IP 258 OP 636: Performed by: EMERGENCY MEDICINE

## 2021-10-06 PROCEDURE — 96375 TX/PRO/DX INJ NEW DRUG ADDON: CPT

## 2021-10-06 RX ORDER — ONDANSETRON 2 MG/ML
4 INJECTION INTRAMUSCULAR; INTRAVENOUS ONCE
Status: COMPLETED | OUTPATIENT
Start: 2021-10-06 | End: 2021-10-06

## 2021-10-06 RX ORDER — HYDROMORPHONE HYDROCHLORIDE 1 MG/ML
0.5 INJECTION, SOLUTION INTRAMUSCULAR; INTRAVENOUS; SUBCUTANEOUS ONCE
Status: COMPLETED | OUTPATIENT
Start: 2021-10-06 | End: 2021-10-06

## 2021-10-06 RX ORDER — LITHIUM CARBONATE 300 MG/1
TABLET, FILM COATED, EXTENDED RELEASE ORAL
Qty: 120 TABLET | Refills: 0 | Status: CANCELLED | OUTPATIENT
Start: 2021-10-06

## 2021-10-06 RX ADMIN — SODIUM CHLORIDE 1000 ML: 9 INJECTION, SOLUTION INTRAVENOUS at 21:29

## 2021-10-06 RX ADMIN — ONDANSETRON 4 MG: 2 INJECTION INTRAMUSCULAR; INTRAVENOUS at 21:32

## 2021-10-06 RX ADMIN — HYDROMORPHONE HYDROCHLORIDE 0.5 MG: 1 INJECTION, SOLUTION INTRAMUSCULAR; INTRAVENOUS; SUBCUTANEOUS at 21:29

## 2021-10-07 ENCOUNTER — HOSPITAL ENCOUNTER (EMERGENCY)
Facility: CLINIC | Age: 31
Discharge: HOME | End: 2021-10-07
Attending: EMERGENCY MEDICINE | Admitting: EMERGENCY MEDICINE
Payer: COMMERCIAL

## 2021-10-07 ENCOUNTER — APPOINTMENT (OUTPATIENT)
Dept: CT IMAGING | Facility: CLINIC | Age: 31
End: 2021-10-07
Attending: EMERGENCY MEDICINE
Payer: COMMERCIAL

## 2021-10-07 VITALS
RESPIRATION RATE: 22 BRPM | DIASTOLIC BLOOD PRESSURE: 89 MMHG | OXYGEN SATURATION: 96 % | TEMPERATURE: 98.3 F | WEIGHT: 229 LBS | SYSTOLIC BLOOD PRESSURE: 132 MMHG | HEART RATE: 89 BPM | BODY MASS INDEX: 36.96 KG/M2

## 2021-10-07 DIAGNOSIS — R31.9 HEMATURIA, UNSPECIFIED TYPE: ICD-10-CM

## 2021-10-07 DIAGNOSIS — F25.0 SCHIZOAFFECTIVE DISORDER, BIPOLAR TYPE (H): ICD-10-CM

## 2021-10-07 DIAGNOSIS — R73.9 HYPERGLYCEMIA: ICD-10-CM

## 2021-10-07 DIAGNOSIS — N20.0 CALCULUS OF KIDNEY: ICD-10-CM

## 2021-10-07 DIAGNOSIS — R10.9 FLANK PAIN: ICD-10-CM

## 2021-10-07 LAB
HBA1C MFR BLD: 5.9 % (ref 0–5.6)
HCG UR QL: NEGATIVE

## 2021-10-07 PROCEDURE — 74176 CT ABD & PELVIS W/O CONTRAST: CPT

## 2021-10-07 PROCEDURE — 96376 TX/PRO/DX INJ SAME DRUG ADON: CPT

## 2021-10-07 PROCEDURE — 250N000011 HC RX IP 250 OP 636: Performed by: EMERGENCY MEDICINE

## 2021-10-07 PROCEDURE — 96375 TX/PRO/DX INJ NEW DRUG ADDON: CPT

## 2021-10-07 RX ORDER — ONDANSETRON 2 MG/ML
4 INJECTION INTRAMUSCULAR; INTRAVENOUS EVERY 30 MIN PRN
Status: DISCONTINUED | OUTPATIENT
Start: 2021-10-07 | End: 2021-10-07 | Stop reason: HOSPADM

## 2021-10-07 RX ORDER — HYDROMORPHONE HYDROCHLORIDE 1 MG/ML
0.5 INJECTION, SOLUTION INTRAMUSCULAR; INTRAVENOUS; SUBCUTANEOUS ONCE
Status: COMPLETED | OUTPATIENT
Start: 2021-10-07 | End: 2021-10-07

## 2021-10-07 RX ORDER — KETOROLAC TROMETHAMINE 15 MG/ML
15 INJECTION, SOLUTION INTRAMUSCULAR; INTRAVENOUS ONCE
Status: COMPLETED | OUTPATIENT
Start: 2021-10-07 | End: 2021-10-07

## 2021-10-07 RX ADMIN — KETOROLAC TROMETHAMINE 15 MG: 15 INJECTION, SOLUTION INTRAMUSCULAR; INTRAVENOUS at 00:36

## 2021-10-07 RX ADMIN — ONDANSETRON 4 MG: 2 INJECTION INTRAMUSCULAR; INTRAVENOUS at 00:36

## 2021-10-07 RX ADMIN — HYDROMORPHONE HYDROCHLORIDE 0.5 MG: 1 INJECTION, SOLUTION INTRAMUSCULAR; INTRAVENOUS; SUBCUTANEOUS at 01:04

## 2021-10-07 ASSESSMENT — ENCOUNTER SYMPTOMS
FEVER: 0
VOMITING: 0
FLANK PAIN: 1
DIARRHEA: 0
HEMATURIA: 1

## 2021-10-07 NOTE — DISCHARGE INSTRUCTIONS
Make a lab appointment to bring stool sample in.  The urine culture is in process, you would get a call if positive. This would mean that you would need to be started on antibiotics  Follow up with primary care provider about the elevated blood sugar  If blood does not clear in urine, should see urology

## 2021-10-07 NOTE — ED PROVIDER NOTES
History   Chief Complaint:  Flank Pain       HPI   Ayana Prasad is a 31 year old adult with history of kidney stones and AVA who presents with flank pain and hematuria. Per the patient the flank pain started suddenly around 1800. He then developed blood in his urine. He also reports a history of kidney stones. He denies urinary frequency, dysuria, fever, vomiting, cough, shortness of breath, worse pain with deep breathing. He took zofran earlier today. No vaginal bleeding.     Review of Systems   Constitutional: Negative for fever.   Gastrointestinal: Negative for diarrhea and vomiting.   Genitourinary: Positive for flank pain and hematuria.   All other systems reviewed and are negative.    Allergies:  Haldol   Percocet [Oxycodone-Acetaminophen]  Prednisone  Risperidone  Tramadol Hcl  Droperidol  Seroquel [Quetiapine]    Medications:  Adderall  Lexapro  Gabapentin  Nicotine  Lisdexamfetamine    Zyprexa  Prilosec  Seroquel  Depotestosterone   Eszopiclone  Benzatropine  Aripiprazole    Past Medical History:     ADHD  Bipolar 1 disorder  BPD  Chronic low back pian  Depression  GERD  Nephrolithiasis  Leukocytosis  Schizoaffective disorder  PTSD   Polysubstance abuse (heroin, marijuana, meth)   Suicidal ideation  Overdose  Seizure-like activity  AVA      Past Surgical History:    Back surgery  Combined cystoscopy insert stent ureter  combined cystoscopy retrogrades, exchange stent ureters x3  Left L5/S1 transforaminal injection for selective L5 nerve root block;  Laparoscopic cholecystectomy   Combined cystoscopy, ureteroscopy, laser holmium lithotripsy ureters, insert stent   Transurethral stone resection      Family History:    Father: crohn's disease, liver cancer, obesity  Brother: hypertensin, esophagei cancer, diabetes, obesity  Mother: hyperlipidemia, metal illness, anxiety disorder    Social History:  Patient was unaccompanied  History of polysubstance abuse    Physical Exam     Patient Vitals for the past 24  hrs:   BP Temp Temp src Pulse Resp SpO2 Weight   10/07/21 0030 (!) 145/101 98.3  F (36.8  C) -- 100 -- 96 % --   10/06/21 2120 (!) 147/95 99.7  F (37.6  C) Oral (!) 125 22 96 % 103.9 kg (229 lb)       Physical Exam  General: Sitting up in bed  Eyes:  The pupils are equal and round    Conjunctivae and sclerae are normal  ENT:    Wearing a mask  Neck:  Normal range of motion  CV:  Regular rate, regular rhythm     Skin warm and well perfused   Resp:  Non labored breathing on room air    No tachypnea    No cough heard  GI:  Abdomen is soft, there is no rigidity    No distension    No rebound tenderness     No abdominal tenderness    Minimal left lower back tenderness  MS:  Normal muscular tone  Skin:  No rash or acute skin lesions noted  Neuro:   Awake, alert.      Speech is normal and fluent.    Face is symmetric.     Moves all extremities equally  Psych: Normal affect.  Appropriate interactions.    Emergency Department Course     Imaging:  CT Abdomen Pelvis w/o Contrast   Preliminary Result   IMPRESSION:    1. No cause of acute pain identified in the abdomen or pelvis.   2. A few small nonobstructing bilateral renal calculi. No ureteral calculi or evidence of urinary obstruction.         Report per radiology    Laboratory:  Labs Ordered and Resulted from Time of ED Arrival Up to the Time of Departure from the ED   COMPREHENSIVE METABOLIC PANEL - Abnormal; Notable for the following components:       Result Value    Glucose 253 (*)     All other components within normal limits    Narrative:     The sex of this patient cannot be reliably determined based on discrepancies in demographics (legal sex, sex assigned at birth, gender identity).  Both male and female reference ranges are provided where applicable.  Careful evaluation of the patient s results as compared to the gender specific reference intervals is required in this setting.    ROUTINE UA WITH MICROSCOPIC REFLEX TO CULTURE - Abnormal; Notable for the following  components:    Color Urine Light Brown (*)     Glucose Urine >=1000 (*)     Blood Urine Large (*)     Leukocyte Esterase Urine Small (*)     RBC Urine >182 (*)     WBC Urine 82 (*)     All other components within normal limits    Narrative:     Urine Culture ordered based on laboratory criteria   CBC WITH PLATELETS AND DIFFERENTIAL - Abnormal; Notable for the following components:    WBC Count 12.4 (*)     MCH 26.3 (*)     Absolute Immature Granulocytes 0.1 (*)     All other components within normal limits    Narrative:     The sex of this patient cannot be reliably determined based on discrepancies in demographics (legal sex, sex assigned at birth, gender identity).  Both male and female reference ranges are provided where applicable.  Careful evaluation of the patient s results as compared to the gender specific reference intervals is required in this setting.    HEMOGLOBIN A1C - Abnormal; Notable for the following components:    Hemoglobin A1C 5.9 (*)     All other components within normal limits   LIPASE - Normal   HCG QUALITATIVE URINE - Normal   URINE CULTURE   CBC WITH PLATELETS & DIFFERENTIAL    Narrative:     The following orders were created for panel order CBC with platelets + differential.  Procedure                               Abnormality         Status                     ---------                               -----------         ------                     CBC with platelets and d...[[id number]]  Abnormal            Final result                 Please view results for these tests on the individual orders.      Emergency Department Course:  Reviewed:  I reviewed nursing notes, vitals, past medical history, Care Everywhere and [facility]    Assessments:  0022 I obtained history and examined the patient as noted above.   0245 I rechecked the patient    Interventions:  2129 Sodium chloride bolus, 1000 ml, IV   2129 Dilaudid, 0.5 mg, IV  2132 Zofran, 4 mg, IV  0036 Toradol , 15 mg, IV  0036 Zofran, 4 mg, IV  "  0104 Dilaudid, 0.5 mg, IV    Disposition:  The patient was discharged to home.     Impression & Plan     Medical Decision Making:  Ayana \"galindo\" VINCENT Prasad is a 31 year old male who presented to the ED with flank pain.  Patient with left flank pain.  Reports history of stones.  Did notice blood in the urine after the pain started.  CT abdomen showed bilateral kidney stones but no ureteral stone.  No other finding that would cause pain on the abdomen CT.  Urine analysis is abnormal with blood.  Some white blood cells which is likely from the amount of red blood cells.  Urine culture is in process.  Is not having other symptoms of urinary tract infection though discussed that this is a possibility.  Declines starting antibiotics at this time as has a history of C. difficile colitis.  Would like to wait for the urine culture results.  Is also having diarrhea and his primary care provider had put in orders for C. difficile testing recently but has not made a lab appointment yet.  He was given collection kit for stool sample that he can bring into his primary care lab.  Also has hyperglycemia.  Hemoglobin A1c is slightly higher than a few months ago.  Recommend follow-up with primary care doctor about this.  No shortness of breath, chest pain, cough or respiratory symptoms to suggest PE.    Diagnosis:    ICD-10-CM    1. Hyperglycemia  R73.9    2. Hematuria, unspecified type  R31.9    3. Flank pain  R10.9    4. Calculus of kidney  N20.0      Scribe Disclosure:  WAYNE, Orlando Hinkle, am serving as a scribe at 12:21 AM on 10/7/2021 to document services personally performed by Tran Burden, based on my observations and the provider's statements to me.            Tran Burden MD  10/07/21 0311    "

## 2021-10-08 DIAGNOSIS — F90.2 ATTENTION DEFICIT HYPERACTIVITY DISORDER (ADHD), COMBINED TYPE: ICD-10-CM

## 2021-10-08 DIAGNOSIS — F25.0 SCHIZOAFFECTIVE DISORDER, BIPOLAR TYPE (H): ICD-10-CM

## 2021-10-08 LAB — BACTERIA UR CULT: NORMAL

## 2021-10-08 RX ORDER — QUETIAPINE FUMARATE 50 MG/1
TABLET, FILM COATED ORAL
Qty: 60 TABLET | Refills: 0 | OUTPATIENT
Start: 2021-10-08

## 2021-10-08 RX ORDER — LITHIUM CARBONATE 300 MG/1
TABLET, FILM COATED, EXTENDED RELEASE ORAL
Qty: 120 TABLET | Refills: 0 | OUTPATIENT
Start: 2021-10-08

## 2021-10-08 RX ORDER — QUETIAPINE FUMARATE 50 MG/1
100 TABLET, FILM COATED ORAL AT BEDTIME
Qty: 60 TABLET | Refills: 0 | Status: SHIPPED | OUTPATIENT
Start: 2021-10-08 | End: 2021-10-28

## 2021-10-08 RX ORDER — LITHIUM CARBONATE 300 MG/1
TABLET, FILM COATED, EXTENDED RELEASE ORAL
Qty: 120 TABLET | Refills: 0 | Status: SHIPPED | OUTPATIENT
Start: 2021-10-08 | End: 2021-10-28

## 2021-10-08 NOTE — TELEPHONE ENCOUNTER
Writer received call from pt, requesting refill of all psych meds except for clonazepam. Writer reviewed list with patient, who identified the following meds needed: Adderall, Lexapro, gabapentin, Vyvanse, lithium, Seroquel.    Last seen: 9/7  RTC: 10/5  Non-provider cancel: None  No-show: None  Next appt: 10/28     Incoming refill from patient via telephone     Medication requested:  Disp Refills Start End TALHA    amphetamine-dextroamphetamine (ADDERALL) 10 MG tablet 30 tablet 0 9/13/2021  No   Sig: TAKE 1 TABLET BY MOUTH EVERY AFTERNOON   Last refilled: 9/13 (9/13 order), 8/14, 7/14 per [place]      Disp Refills Start End TALHA   escitalopram (LEXAPRO) 10 MG tablet 90 tablet 0 9/16/2021  No   Sig: TAKE 1 TABLET BY MOUTH DAILY      Disp Refills Start End TALHA   gabapentin (NEURONTIN) 300 MG capsule 300 capsule 0 9/13/2021  No   Sig: TAKE 3 CAPSULES (900MG) AND TAKE 4 CAPSULES (1200MG ) BY MOUTH MIDDAY DAY AND TAKE 3 CAPSULES (900MG) BY MOUTH EVERY EVENING   Per [place]  last filled 10/7, 9/2, 8/5 (all 12/11 order).     Disp Refills Start End TALHA   lisdexamfetamine (VYVANSE) 50 MG capsule 30 capsule 0 9/13/2021  No   Sig: TAKE 1 CAPSULE BY MOUTH EVERY MORNING   Last refilled: 9/13 (9/13 order), 8/14, 7/14 per [place]      Disp Refills Start End TALHA    lithium ER (LITHOBID) 300 MG CR tablet 120 tablet 0 9/13/2021  No   Sig: Take one tab (1) each morning and three (3) tabs at bedtime     Disp Refills Start End TALHA    QUEtiapine (SEROQUEL) 50 MG tablet 60 tablet 0 9/3/2021  No   Sig: TAKE 2 TABLETS BY MOUTH EVERY NIGHT AT BEDTIME FOR SLEEP     Writer called pharmacy ([phone]) and Mikaela identified that she ran the pt's med cycle today and lithium and Seroquel are the only refills needed.    Medication refill approved per refill protocol.  Writer e-prescribed 30-day supply of lithium and Seroquel to pharmacy.

## 2021-10-08 NOTE — TELEPHONE ENCOUNTER
Requested Prescriptions   Pending Prescriptions Disp Refills     testosterone cypionate (DEPOTESTOSTERONE) 200 MG/ML injection [Pharmacy Med Name: Testosterone Cypionate 200MG/ML SOLN] 4 mL      Sig: INJECT 0.2 ML SUBCUTANEOUSLY ONCE WEEKLY       Androgen Agents Failed - 10/8/2021  1:03 PM        Failed - Refills for this classification require provider review        Passed - Patient is of age 12 and older        Passed - Lipid panel on file in past 12 mos     Recent Labs   Lab Test 04/28/21  0000   CHOL 181   TRIG 317*   HDL 37*   LDL 81   NHDL 144               Passed - ALT on file within past 12 mos     Recent Labs   Lab Test 10/06/21  2129   ALT 62             Passed - Medication is active on med list        Passed - HCT less than 54% on file within past 12 mos     Recent Labs   Lab Test 10/06/21  2129   HCT 40.8             Passed - Serum Testosterone on file within past 12 mos     Recent Labs   Lab Test 03/01/21  1558   TESTOSTTOTAL 3*             Passed - Blood pressure under 140/90 in past 6 months     BP Readings from Last 3 Encounters:   10/07/21 132/89   09/14/21 (!) 128/92   07/21/21 122/88                 Passed - Patient is not pregnant        Passed - No positive pregnancy test on file within past 12 mos        Passed - Recent (6 mo) or future (30 days) visit within the authorizing provider's specialty        Passed - AST on file within past 12 mos     Recent Labs   Lab Test 10/06/21  2129   AST 44                omeprazole (PRILOSEC) 20 MG DR capsule [Pharmacy Med Name: Omeprazole 20MG CPDR] 30 capsule 0     Sig: TAKE 1 CAPSULE BY MOUTH DAILY       PPI Protocol Passed - 10/8/2021  1:03 PM        Passed - Not on Clopidogrel (unless Pantoprazole ordered)        Passed - No diagnosis of osteoporosis on record        Passed - Recent (12 mo) or future (30 days) visit within the authorizing provider's specialty     Patient has had an office visit with the authorizing provider or a provider within the  "authorizing providers department within the previous 12 mos or has a future within next 30 days. See \"Patient Info\" tab in inbasket, or \"Choose Columns\" in Meds & Orders section of the refill encounter.              Passed - Medication is active on med list        Passed - Patient is age 18 or older        Passed - No active pregnacy on record        Passed - No positive pregnancy test in past 12 months         Refused Prescriptions Disp Refills     QUEtiapine (SEROQUEL) 50 MG tablet [Pharmacy Med Name: QUEtiapine Fumarate 50MG TABS*] 60 tablet 0     Sig: TAKE 2 TABLETS BY MOUTH EVERY NIGHT AT BEDTIME FOR SLEEP       Antipsychotic Medications Passed - 10/8/2021  1:03 PM        Passed - Blood pressure under 140/90 in past 12 months     BP Readings from Last 3 Encounters:   10/07/21 132/89   09/14/21 (!) 128/92   07/21/21 122/88                 Passed - Patient is 12 years of age or older        Passed - Lipid panel on file within the past 12 months     Recent Labs   Lab Test 04/28/21  0000   CHOL 181   TRIG 317*   HDL 37*   LDL 81   NHDL 144               Passed - CBC on file in past 12 months     Recent Labs   Lab Test 10/06/21  2129   WBC 12.4*   RBC 4.99   HGB 13.1   HCT 40.8                    Passed - Heart Rate on file within past 12 months     Pulse Readings from Last 3 Encounters:   10/07/21 89   09/14/21 110   07/21/21 104               Passed - A1c or Glucose on file in past 12 months     Recent Labs   Lab Test 10/06/21  2129   *   A1C 5.9*       Please review patients last 3 weights. If a weight gain of >10 lbs exists, you may refill the prescription once after instructing the patient to schedule an appointment within the next 30 days.    Wt Readings from Last 3 Encounters:   10/06/21 103.9 kg (229 lb)   09/14/21 106.6 kg (235 lb)   07/21/21 105.5 kg (232 lb 9.6 oz)             Passed - Medication is active on med list        Passed - Patient is not pregnant        Passed - No positve " "pregnancy test on file in past 12 months        Passed - Recent (6 mo) or future (30 days) visit within the authorizing provider's specialty     Patient had office visit in the last 6 months or has a visit in the next 30 days with authorizing provider or within the authorizing provider's specialty.  See \"Patient Info\" tab in inbasket, or \"Choose Columns\" in Meds & Orders section of the refill encounter.               Routing refill request to provider for review/approval because:  Failed protocol.  Tran Thibodeaux RN, BSN  Triage nurse  [facility]: [place]    "

## 2021-10-08 NOTE — RESULT ENCOUNTER NOTE
Final urine culture report is negative.  Adult Negative Urine culture parameters per protocol: Any # Urogenital single or mixed organism, <10,000 col/ml single organism (cath specimen), and <50,000 col/ml single organism (midstream).  [facility] discharge antibiotic prescribed (If applicable): None  Treatment recommendations per [facility] ED Lab Result Urine Culture protocol.

## 2021-10-11 DIAGNOSIS — R11.2 NON-INTRACTABLE VOMITING WITH NAUSEA, UNSPECIFIED VOMITING TYPE: ICD-10-CM

## 2021-10-11 RX ORDER — TESTOSTERONE CYPIONATE 200 MG/ML
INJECTION, SOLUTION INTRAMUSCULAR
Qty: 4 ML | Refills: 0 | Status: SHIPPED | OUTPATIENT
Start: 2021-10-11 | End: 2021-11-22

## 2021-10-13 ENCOUNTER — VIRTUAL VISIT (OUTPATIENT)
Dept: PSYCHOLOGY | Facility: CLINIC | Age: 31
End: 2021-10-13
Payer: COMMERCIAL

## 2021-10-13 DIAGNOSIS — F64.9 GENDER DYSPHORIA: Primary | ICD-10-CM

## 2021-10-13 DIAGNOSIS — F25.9 SCHIZOAFFECTIVE DISORDER, CHRONIC CONDITION (H): ICD-10-CM

## 2021-10-13 PROCEDURE — 90834 PSYTX W PT 45 MINUTES: CPT | Mod: 95 | Performed by: MARRIAGE & FAMILY THERAPIST

## 2021-10-13 PROCEDURE — 99207 PR NO BILLABLE SERVICE THIS VISIT: CPT | Performed by: MARRIAGE & FAMILY THERAPIST

## 2021-10-13 NOTE — LETTER
October 13, 2021     [facility]  Department of Plastic Surgery   [facility] [address] [address]    Re: Prakash Prasad  YOB: 1990  MRN:  [id number]    Primary letter of support for gender affirmation surgery (GAS); top surgery     Dear [facility],    I am writing this letter in support of Prakash Prasad s intention to pursue GAS, specifically top surgery. Prakash is a 31 year-old transmasculine person who has been receiving care through the [facility] ([facility] since Feb 2021. Prakash sought services at [facility] access hospital to receive his letter of support for GAS and to make decisions about his transition and care. I have been employed as an  and Licensed Marital and Family Therapist with the Critical access hospital since September 2016, and have been working with transgender and gender diverse clients in the Transgender Health Services Program since that time.     Prakash completed a diagnostic assessment with Dr. Dora Mazariegos LP on February 26, 2021 and continued to meet with her bi-weekly to address concerns related to gender dysphoria until she left in August 2021. Through the diagnostic assessment process, Prakash was diagnosed with 302.85 - Gender Dysphoria. This author met with Prakash to continue the process of obtaining a letter of support on October 13, 2021. Prakash was diagnosed with Gender Dysphoria based on indication of social and anatomical gender dysphoria related to his birth assigned sex dating back to childhood. Prakash began his social gender transition within the past year. He began masculinizing hormone therapy in March 2021 through [facility]. He changed his legal name to his chosen name October 2021. He plans to continue individual therapy to support his gender transition.     Prakash reported the following  mental health diagnoses: F25.0/295.70 - Schizoaffective Disorder, Bipolar Type; F43.10/309.81- Posttraumatic Stress Disorder; F41.1/300.02 - Generalized Anxiety Disorder; F90.9/314.01  - Unspecified Attention Deficit/Hyperactivity Disorder. 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 Prakash receives mental health therapy from JULISA Vanegas through [facility]. He is in the process of transferring psychiatric care from Funsho King, PMHNP, [facility] to [place], through [facility]. Current medications include: Prolixin 25mg injection every two weeks for psychotic symptoms; Lithium 1200mg; Adderall 10mg; Vyvanse 50mg; Ambien 5mg at bedtime; Klonopin as needed for anxiety; Zyprexa as needed. Prakash has additional support through [facility] and in-home nursing care. Additionally, Prakash reports that he voluntarily extends a civil mental health commitment in service of ongoing mental health stability.      His last psychiatric hospitalization was in 2019 due to suicidal ideation and psychosis. He reports no ongoing suicidal ideation, and stability of mental health symptoms for the past two years through his medication regimen and the ongoing support of his care team. At this time, Prakash reported that his mental health is stable. He continues to experience symptoms including auditory hallucinations, but reports good coping skills and no interference with his daily life. He has demonstrated good compliance with treatment recommendations, including adherence to a complex medication regimen, with the support of his care team.       Prakash greatly desires GAS to decrease his gender and anatomical dysphoria. Prakash has given his informed consent to work towards GAS, and has support and knowledge regarding the aftercare process. Prakash has consulted with other individuals who have  gone through this surgery to develop an aftercare plan that takes into account the recommendations for post-surgery recovery. Prakash has support from friends and his care team during his recovery. It is recommended that Prakash utilize supports through his group home environment and care team during recovery.     Prakash currently meets the World Professional Association for Transgender Health (WPATH) Standards of Care for surgical gender reassignment of gender dysphoric persons, as well as internal prerequisites for GAS:    WPATH Criteria for Top Surgery (One Referral)    1. Persistent well-documented gender dysphoria    2. Capacity to make a fully informed decision and to consent to treatment    3. Age of majority in a given country; if minor, parental consent    4. If significant medical or mental health concerns are present, they must be reasonably well controlled.    Surgery is considered a medically necessary treatment by the WPATH Standards of Care for transgender persons. On behalf of our Transgender Services team, I support Prakash s decision to pursue medically necessary surgery at this time.     In some cases, denial of access to this surgery can lead to increased isolation, decreased work performance, and decreased sexual and interpersonal functioning. Surgery is expected to decrease dysphoria, increase comfort with self, and improve interpersonal, sexual, and vocational functioning. Prakash has educated himself about the surgery and is aware of its risks and benefits.    The team s support is based on the psychological indication for surgery and did not include a physical examination to assess medical contra-indications for the surgical procedure. This letter of support is valid for one year from the date of approval.      Please note that I am available for ongoing consultation and coordination of Prakash s ongoing care needs. If you have any questions, or are in need of additional information, please do  not hesitate to contact me.      Sincerely,           Pronouns: she/her/hers    [facility]  Department of Family Medicine & Community Health  [facility]  [phone]

## 2021-10-13 NOTE — PROGRESS NOTES
[facility] -  Case Progress Note    Date of Service: 10/13/21   Legal Name: Ayana Prasad  Chosen name: Prakash Prasad (he/him)   : 1990  Medical Record Number: [id number]  Treating Provider: Dora Mazariegos, PhD - Postdoctoral Fellow   Type of Session: Individual  Present in Session: Prakash   Number of Minutes:  45  DA Completed: 21  Tx Plan Completed: 3/9/21    Health Maintenance Summary - Mental Health Treatment Plan       Status Date      MENTAL HEALTH TX PLAN Next Due 2022      Done 3/9/2021 HIM MENTAL HEALTH TX PLAN SCAN        Video start time: 2:00  Video end time: 2:45    Telemedicine Visit: The patient's condition can be safely assessed and treated via synchronous audio and visual telemedicine encounter.      Reason for Telemedicine Visit: Services only offered telehealth    Originating Site (Patient Location): Patient's home    Distant Site (Provider Location): Provider Remote Setting    Consent:  The patient/guardian has verbally consented to: the potential risks and benefits of telemedicine (video visit) versus in person care; bill my insurance or make self-payment for services provided; and responsibility for payment of non-covered services.     Mode of Communication:  Video Conference via [employer]    As the provider I attest to compliance with applicable laws and regulations related to telemedicine.    Current Symptoms/Status:  Gender Dysphoria: discomfort with birth-assigned sex (female), identifies and expresses gender as male. Dysphoria includes social and anatomical dysphoria. Prakash is pursuing gender-affirming medical interventions and has socially transitioned in most areas of his life.      Significant mental health history, with active diagnoses of Schizoaffective Disorder - Bipolar Type, PTSD, Generalized Anxiety Disorder, ADHD. Mental health symptoms are managed with medication and monitored through routine psychiatry visits and civil commitment. Prakash has  therapy and [facility] support. Symptoms are reported to be stable at present.      Significant substance use history, including Opioid use Disorder, Stimulant Use Disorder, Cannabis Use Disorder. Specific substance use diagnoses unclear and require further assessment and collateral information.      Progress Toward Treatment Goals:   Prakash began masculinizing HRT under care of [facility] - psychiatric symptoms are reported to be improved this week.    Intervention: Modality and Description:  Primary intervention was supportive individual therapy. He presented today seated upright, was engaged and focused. Prakash reported feeling well today, symptoms reported as stable. Explored history of gender dysphoria, mental health, plans for surgery and overall experiences with transition.Prakash was engaged today and responsive to feedback.     Co-wrote letter of support.      Assignment:  Continue attending to MH stability and use all resources    Interactive Complexity:  There are four specific communication difficulties that complicate the work of the primary psychiatric procedure.  Interactive complexity (+[id number]) may be reported when at least one of these difficulties is present.    Communication difficulties present during current the psychiatric procedure include:  1. None.    DSM-5 Diagnoses:    Encounter Diagnoses   Name Primary?     Gender dysphoria Yes     Schizoaffective disorder, chronic condition (H)        History of opioid, methamphetamine, and cannabis use disorders.     Plan/Need for Future Services:  Return in two weeks for check-in on mood status and to finalize LOS.      Aleshia Henao PsyD, NATHAN      TREATMENT PLAN  Date of Treatment Plan: 3/9/21  Name: Ayana Prasad  : 1990  Medical Record Number: [id number]  Treating Provider: Dora Mazariegos, PhD - Postdoctoral Fellow  Type of Session: Individual  Present in Session: Prakash    Current Status:    Depression/Mood:  History of manic  episodes with no current mood issues reported.    Anxiety/Panic:  Worry  Physiological reactivity (PTSD)    Thought:   Auditory Hallucinations    Sensorium:  Reports no problems or symptoms at this time    Behavior/Health:  Reports no problems or symptoms at this time    Chemical Use:  Denies both Alcohol and Drug Abuse - hx of substance dependence    Sexual Problems:  Reports no problems or symptoms at this time    Gender Problems:  Body and social dysphoria     Suicide Risk Assessment:  Assessed Level of Immediate Risk:  YES  Ideation:  NO  Plan:  NO  Means:  NO  Intent:  NO    Homicide Risk Assessment:  Assessed Level of Immediate Risk:  YES  Ideation:  NO  Plan:  NO  Means:  NO  Intent:  NO    Impact of Symptoms on Function:  Decreased Physical/Health Status  Decreased Social/Family Function  Decreased Work Function  Decreased School Function    DSM-5 Diagnoses:     F64.0/302.85  - Gender Dysphoria in Adult      F25.0/295.70  - Schizoaffective Disorder, Bipolar Type - per history  F43.10/309.81- Posttraumatic Stress Disorder - per history  F41.1/300.02  - Generalized Anxiety Disorder - per history  F90.9/314.01  - Unspecified Attention Deficit/Hyperactivity Disorder - per history     History of opioid, methamphetamine, and cannabis use disorders.     Screening Questionnaires:  Please indicate whether the following screening questionnaires have been completed:  CAGE: Yes  PHQ-9: Yes    AVA-7: Yes  Safety Screening: Yes  WHODAS: No    Problem(s):  1. Gender Dysphoria  2. Mental health issues of Schizoaffective Disorder, PTSD, Anxiety, ADHD  3. History of substance abuse    Short-Long Term Goals  Decrease Symptoms  Increase Coping  Monitor Psych Status    Interventions  [facility]  Cognitive-Behavioral Therapy    Expected Outcomes and Prognosis  Expected improvement    Anticipated Treatment Duration:  Unknown    Frequency of Sessions  2 x / Month    Progress Update (for Plan Update Only)  NA:  1st  Treatment Planning    Current Psychoactive Medications:    - Prolixin 25mg injection every two weeks for psychotic symptoms  - Lithium 1200mg  - Adderall 10mg  - Vyvanse 50mg  - Ambien 5mg at bedtime  - Klonopin as needed for anxiety  - Zyprexa as needed.   Discharge & Aftercare Goals: To Be Determined.  Care Coordination: Yes; psychiatrist Jim Corbett, [facility], [facility]; therapist Joana Hagen [facility]; [facility], [facility] worker Nahum Bowman through [employer].     Consent to Treatment:   Patient participated in this treatment planning process and indicated verbal agreement with the above treatment plan.  If patient doesn't sign, indicate why: Telehealth visit due to COVID19 pandemic    Patient Signature: unable to sign - verbal review and consent given through telemedicine  Date: 3/9/21    Provider Signature:     Date: 3/9/21    Supervisor Signature:Hilda Davis PsyD, CLAUDE    Date: 3/14/2021

## 2021-10-14 ENCOUNTER — NURSE TRIAGE (OUTPATIENT)
Dept: NURSING | Facility: CLINIC | Age: 31
End: 2021-10-14

## 2021-10-15 ENCOUNTER — VIRTUAL VISIT (OUTPATIENT)
Dept: BEHAVIORAL HEALTH | Facility: CLINIC | Age: 31
End: 2021-10-15
Payer: COMMERCIAL

## 2021-10-15 ENCOUNTER — E-VISIT (OUTPATIENT)
Dept: FAMILY MEDICINE | Facility: CLINIC | Age: 31
End: 2021-10-15
Payer: COMMERCIAL

## 2021-10-15 ENCOUNTER — MYC MEDICAL ADVICE (OUTPATIENT)
Dept: BEHAVIORAL HEALTH | Facility: CLINIC | Age: 31
End: 2021-10-15

## 2021-10-15 DIAGNOSIS — K21.9 GASTROESOPHAGEAL REFLUX DISEASE WITHOUT ESOPHAGITIS: ICD-10-CM

## 2021-10-15 DIAGNOSIS — K21.00 GASTROESOPHAGEAL REFLUX DISEASE WITH ESOPHAGITIS WITHOUT HEMORRHAGE: Primary | ICD-10-CM

## 2021-10-15 DIAGNOSIS — F25.9 SCHIZOAFFECTIVE DISORDER, CHRONIC CONDITION (H): Primary | ICD-10-CM

## 2021-10-15 DIAGNOSIS — F30.9 MANIA (H): ICD-10-CM

## 2021-10-15 DIAGNOSIS — F25.0 SCHIZOAFFECTIVE DISORDER, BIPOLAR TYPE (H): Primary | ICD-10-CM

## 2021-10-15 PROCEDURE — 99421 OL DIG E/M SVC 5-10 MIN: CPT | Performed by: NURSE PRACTITIONER

## 2021-10-15 PROCEDURE — 90834 PSYTX W PT 45 MINUTES: CPT | Mod: 95 | Performed by: SOCIAL WORKER

## 2021-10-15 NOTE — PROGRESS NOTES
Progress Note    Patient Name: Ayana Prasad  Date: 10/15/2021       Service Type: Individual      Session Start Time: 4:04 Session End Time: 4:53     Session Length: 49 min    Session #: 15    Attendees: Client attended alone    Service Modality:  Video Visit:      Provider verified identity through the following two step process.  Patient provided:  Patient is known previously to provider    Telemedicine Visit: The patient's condition can be safely assessed and treated via synchronous audio and visual telemedicine encounter.      Reason for Telemedicine Visit: Patient has requested telehealth visit    Originating Site (Patient Location): Patient's home    lDistant Site (Provider Location): [facility]: [facility] and [place].    Consent:  The patient/guardian has verbally consented to: the potential risks and benefits of telemedicine (video visit) versus in person care; bill my insurance or make self-payment for services provided; and responsibility for payment of non-covered services.     Patient would like the video invitation sent by:  My Chart    Mode of Communication:  Video Conference via [employer]    As the provider I attest to compliance with applicable laws and regulations related to telemedicine.     Treatment Plan Last Reviewed:10/15/2021  PHQ-14:  Prakash will be given these questionnaires at next session.   AVA-14:     DATA  Interactive Complexity: No  Crisis: No       Progress Since Last Session (Related to Symptoms / Goals / Homework):   Symptoms: Worsening manic behavior. hallucination (hear's voices that are instructive).      Homework: Partially completed  Does not complete out side of therapy.  Not taking medications.       Episode of Care Goals: Achieved / completed to satisfaction - CONTEMPLATION (Considering change and yet undecided); Intervened by assessing the negative and positive thinking (ambivalence) about behavior  change     Current / Ongoing Stressors and Concerns:    Prakash has stopped taking his medicaitons. Wanted to stop therapy and case management.  States  wants him to remain on a commitment another year.  Strange beliefs. Stopped stimulants without a taper.  Has not been sleeping well states he is not tired.    Has ABIMAEL and .  States his diagnosis is incorrect. He does not believe he has Schizoaffective DO, manic or depressed.  States he has ADHD but does not need themedicaiton.  Was able to convince him to see this provider for another session.    Denied suicidal or homicidal ideations.   Senia [phone] Will keep Prakash on a commitment for another year.        Treatment Objective(s) Addressed in This Session:    Use at least 2 coping skills for stress management in the next 2 weeks.    Reviewd medications.  Reviewed current symptoms.  Discussed best practice.  Discussed services.       Intervention:       On hold this session    Motivational Interviewing: client-centered; Explore and resolve ambivalence to elicite behavior change. Support through exploration of motives to resolve of ambivalence to change.   Motivational interviewing to identify the thoughts and feelings that lead to continue anxiety and help her to develop new thought patterns to aid in symptom management/reduction.     Message sent via [employer] to psychiatry re: medication management.        ASSESSMENT: Current Emotional / Mental Status (status of significant symptoms):   Risk status (Self / Other harm or suicidal ideation)   Patient denies current fears or concerns for personal safety.   Patient denies current or recent suicidal ideation or behaviors.  Denied suicidal or homicidal ideations.10/15/2021   Patient denies current or recent homicidal ideation or behaviors.   Patient denies current or recent self injurious behavior or ideation.   Patient denies other safety concerns.   Patient reports there has been  "no change in risk factors since their last session.     Patient reports there has been no change in protective factors since their last session.     A safety and risk management plan has been developed including: Patient consented to co-developed safety plan.  Safety and risk management plan was completed.  Patient agreed to use safety plan should any safety concerns arise.  A copy was given to the patient.     Appearance:   Approprate   Eye Contact:   Poor   Psychomotor Behavior: Normal  Restless    Attitude:   Guarded  Indifferent Evasive   Orientation:   All   Speech    Rate / Production: Normal/ Responsive Normal     Volume:  Normal    Mood:    Depressed  Apathetic Ambivalent   Affect:    Blunted    Thought Content:  Delusions hears voices that are directive to harm self or ignore professionals. [Prakash states \"they are here for moral support']   Thought Form:  Coherent  Psychosis   Insight:    Good  and Fair      Medication Review:   No changes to current psychiatric medication(s)     Medication Compliance:   Yes     Changes in Health Issues:   None reported     Chemical Use Review:   Substance Use: Chemical use reviewed, no active concerns identified   History of meth use.   Last use 6 months ago.  Will begin harm reduction treatment wit R.     Tobacco Use: No change in amount of tobacco use since last session.  Reviewed information and resources for quitting. Repeats every session. Vapping much of the day.     Diagnosis:  Schizoaffective disorder-Manic      Collateral Reports Completed:   No release needs identified this session.    PLAN: (Patient Tasks / Therapist Tasks / Other)  Follow-up [facility] psychiatry visit on oct 28th.  Discuss taking medictions/reintroducing psychotropic medication. Report to the nearest ED if feeling unsafe.  Continue to engage in activities as able with friends. Continue to contact mother every day. Return in 2-3 weeks.      Joana Hagen, [facility]  10/15/2021                 "                                       ______________________________________________________________________    Treatment Plan    Patient's Name: Ayana Prasad  YOB: 1990    Date: 10/15/2021    DSM5 Diagnoses: 295.70  (F25.1) Schizoaffective Disorder Depressive Type  Psychosocial / Contextual Factors: in group home; relationship issues.    WHODAS: 22    Referral / Collaboration:  No referral or collaboration needs identified this sessions.     Anticipated number of session or this episode of care: 24+      MeasurableTreatment Goal(s) related to diagnosis / functional impairment(s)  Goal 1:  Decrease or alleviate symptoms of depression by 2 points on the PHQ-9   I will know I've met my goal when I no longer have thoughts of suicide.    Objective #A Patient will Decrease frequency and intensity of feeling down, depressed, hopeless  Improve quantity and quality of night time sleep / decrease daytime naps  Identify negative self-talk and behaviors: challenge core beliefs, myths, and actions  Decrease thoughts that you'd be better off dead or of suicide / self-harm.   Patient will follow her safety plan; using skills to manage symptoms  Status: Reviewed  - Date: 10/15/2021     Intervention(s)  Therapist will provide client centered therapy and DBT: mindfulness and emotional regulation..   Patient will follow her safety plan; using skills to manage symptoms    Goal 2: Reduce/manage anxiety decreasing anxiety by 2 points on the AVA.    I will know I've met my goal when I can recognize and mange anxiety.      Objective #A Patient will identify at least 3 triggers for anxiety.    Status: Reviewed  - Date: 10/15/2021       Intervention(s)  Therapist will provide CBT: identify negative self defeating thoughts that exacerbate anxiousness and lead to suicidal ideations.      Patient has reviewed and agreed to the above plan.      Joana Hagen, [facility]  Reviewed   Date: 10/15/2021

## 2021-10-15 NOTE — TELEPHONE ENCOUNTER
TRIAGE CALL     Patient calling  Heart burn for the last 3 days  Has been taking Tums   And her Omeprazole As prescribed    Symptoms/concern started 3 days.   Her PCP  Becki Avery APRN CNP - saw he last month    Medications taken today beside her Rx she tool zofran at noon    Her dinner was her last meal tonight and it was a Taco   Took 2 tums (4 for today) and did not helped    Patient denies difficulty with breathing, chest pain, fever, nausea, vomiting, or diarrhea.     Patient able to Speak in full sentences -     Per protocol, disposition to Home care   Advised her to make an appt with PCP is this continues to happened more often to make changes on medications.    Care advise given, patients questions were answered  Reminded we will be here 24/7 and can call back if symptoms worsen   Patient verbalized understanding and agrees with plan    Martina Torres RN Nurse Triage Advisor 8:54 PM 10/14/2021  Additional Information    Negative: Severe difficulty breathing (e.g., struggling for each breath, speaks in single words)    Negative: Difficult to awaken or acting confused (e.g., disoriented, slurred speech)    Negative: Shock suspected (e.g., cold/pale/clammy skin, too weak to stand, low BP, rapid pulse)    Negative: [1] Chest pain lasts > 5 minutes AND [2] history of heart disease  (i.e., heart attack, bypass surgery, angina, angioplasty, CHF; not just a heart murmur)    Negative: [1] Chest pain lasts > 5 minutes AND [2] described as crushing, pressure-like, or heavy    Negative: [1] Chest pain lasts > 5 minutes AND [2] age > 50    Negative: [1] Chest pain lasts > 5 minutes AND [2] age > 30 AND [3] at least one cardiac risk factor (i.e., hypertension, diabetes, obesity, smoker or strong family history of heart disease)    Negative: [1] Chest pain lasts > 5 minutes AND [2] not relieved with nitroglycerin    Negative: Passed out (i.e., lost consciousness, collapsed and was not responding)    Negative:  "Heart beating < 50 beats per minute OR > 140 beats per minute    Negative: Visible sweat on face or sweat dripping down face    Negative: Sounds like a life-threatening emergency to the triager    Negative: SEVERE chest pain    Negative: [1] Intermittent  chest pain or \"angina\" AND [2] increasing in severity or frequency  (Exception: pains lasting a few seconds)    Negative: Pain also present in shoulder(s) or arm(s) or jaw  (Exception: pain is clearly made worse by movement)    Negative: Difficulty breathing    Negative: Dizziness or lightheadedness    Negative: Coughing up blood    Negative: Cocaine use within last 3 days    Negative: History of prior \"blood clot\" in leg or lungs (i.e., deep vein thrombosis, pulmonary embolism)    Negative: Recent illness requiring prolonged bedrest (i.e., immobilization)    Negative: Hip or leg fracture in past 2 months (e.g., had cast on leg or ankle)    Negative: Major surgery in the past month    Negative: Recent long-distance travel with prolonged time in car, bus, plane, or train (i.e., within past 2 weeks; 6 or  more hours duration)    Negative: Followed a chest injury    Negative: Chest pain lasts > 5 minutes (Exceptions: chest pain occurring > 3 days ago and now asymptomatic; same as previously diagnosed heartburn and has accompanying sour taste in mouth)    Negative: Taking a deep breath makes pain worse    Negative: Patient sounds very sick or weak to the triager    Negative: Fever > 100.5 F (38.1 C)    Negative: Rash in same area as pain (may be described as \"small blisters\")    Negative: [1] Chest pain lasting <= 5 minutes AND [2] NO chest pain or cardiac symptoms now(Exceptions: pains lasting a few seconds)    Negative: [1] Chest pain lasts > 5 minutes AND [2] occurred > 3 days ago (72 hours) AND [3] NO chest pain or cardiac symptoms now    Negative: [1] Patient claims chest pain is same as previously diagnosed \"heartburn\" AND [2] describes burning in chest AND [3] " "accompanying sour taste in mouth    Negative: [1] Chest pain lasting <= 5 minutes AND [2] has not taken prescribed nitroglycerin    Negative: [1] Chest pain lasting <= 5 minutes AND [2] completely relieved by nitroglycerin    Negative: [1] Intermittent chest pain from \"angina\" AND [2] NO increase in severity or frequency    Negative: Chest pain(s) lasting a few seconds from coughing AND [2] persists > 3 days    Negative: [1] Chest pain(s) lasting a few seconds AND [2] persists > 3 days    Negative: Chest pain(s) lasting a few seconds from coughing    Negative: Chest pain(s) lasting a few seconds    Protocols used: CHEST PAIN-A-AH      "

## 2021-10-17 ENCOUNTER — MYC REFILL (OUTPATIENT)
Dept: FAMILY MEDICINE | Facility: CLINIC | Age: 31
End: 2021-10-17

## 2021-10-17 DIAGNOSIS — F25.0 SCHIZOAFFECTIVE DISORDER, BIPOLAR TYPE (H): ICD-10-CM

## 2021-10-18 NOTE — TELEPHONE ENCOUNTER
Last seen: 9/7  RTC: 1 month  Non-provider cancel: None  No-show: None  Next appt: 10/28     Incoming refill from patient via MyChart     Medication requested:  Disp Refills Start End TALHA    clonazePAM (KLONOPIN) 1 MG tablet 14 tablet 0 10/1/2021  No   Sig - Route: Take 1 tablet (1 mg) by mouth 2 times daily as needed for anxiety   Last refilled: 10/1, 9/21, 9/13 (all qty 14) per [facility]     Medication refill approved per refill protocol.  Routed to provider to sign and send.

## 2021-10-19 RX ORDER — CLONAZEPAM 1 MG/1
1 TABLET ORAL 2 TIMES DAILY PRN
Qty: 14 TABLET | Refills: 0 | Status: SHIPPED | OUTPATIENT
Start: 2021-10-19 | End: 2021-10-28

## 2021-10-19 NOTE — TELEPHONE ENCOUNTER
10/19/21 1519 Sign Regine Last APRN CNP     E-Prescribing Status: Receipt confirmed by pharmacy (10/19/2021  3:20 PM CDT)    Routed message to pt via [employer].

## 2021-10-22 ENCOUNTER — TELEPHONE (OUTPATIENT)
Dept: BEHAVIORAL HEALTH | Facility: CLINIC | Age: 31
End: 2021-10-22

## 2021-10-22 ENCOUNTER — DOCUMENTATION ONLY (OUTPATIENT)
Dept: BEHAVIORAL HEALTH | Facility: CLINIC | Age: 31
End: 2021-10-22

## 2021-10-22 NOTE — TELEPHONE ENCOUNTER
T/C to [facility] who was prescribing psychotropic medication as of 8/13/2021. VAL in Patient file signed 9/26/2021 for [facility].  [facility] and Dr. Burrows has not heard form Shelton Randall since 8/13/2021.  Juana nurse manager at [facility] agreed to contact josie to let her know it was time to reschedule for medication management.  This provider reported to Juana Randall missed her appointment with this writer today 10/22/2021.  Juana will contact this provider as needed with any safety/crisis issues..  BOB ManSW

## 2021-10-22 NOTE — PROGRESS NOTES
Contacted Manager Reji Bunn. Reviewed patient file, needs and concens. Reported on collaboration with [facility] and with BROOKLYN Calvin CNP.  Reported Prakash missed her therapy session today at 10:30  A. Discussed Prakash's last session with this provider and concerns re;haven and noncompliance with medication. Prakash has not expressed suicidal ideation or intent to harm self. Based on the information this provider has currently there is insufficient reason to complete a welfare check. This writer will attempt to contact Prakash again at end of day.  BOB VangeasSW

## 2021-10-25 ENCOUNTER — VIRTUAL VISIT (OUTPATIENT)
Dept: DERMATOLOGY | Facility: CLINIC | Age: 31
End: 2021-10-25
Payer: COMMERCIAL

## 2021-10-25 DIAGNOSIS — Z53.9 NO SHOW: Primary | ICD-10-CM

## 2021-10-25 NOTE — LETTER
10/25/2021         RE: Ayana Prasad  [address] [address]        Dear Colleague,    Thank you for referring your patient, Ayana Prasad, to the [facility]. Please see a copy of my visit note below.    Patient called message left [phone]          Again, thank you for allowing me to participate in the care of your patient.        Sincerely,        Oswaldo Amado MD

## 2021-10-26 NOTE — PROGRESS NOTES
"Prakash is a 31 year old who is being evaluated via a billable video visit.      How would you like to obtain your AVS? MyChart  If the video visit is dropped, the invitation should be resent by: Text to cell phone: [phone]  Will anyone else be joining your video visit? No  Video Start Time: 1:58 PM    Assessment & Plan     Prediabetes  Encouraged to discuss use of Seroquel with psychiatrist at appointment tomorrow.  Encouraged to increase activity.  Referral placed for diabetic education.  We will plan to recheck A1c again in 3 months.  - [facility] Adult Diabetes Educator Referral; Future    Review of the result(s) of each unique test - Glucose, A1c  17 minutes spent on the date of the encounter doing chart review, history and exam, documentation and further activities per the note     BMI:   Estimated body mass index is 36.96 kg/m  as calculated from the following:    Height as of 9/14/21: 1.676 m (5' 6\").    Weight as of 10/6/21: 103.9 kg (229 lb).       No follow-ups on file.    BROOKLYN Cruz CNP  [facility] [place] Randall is a 31 year old who presents for the following health issues     HPI     Chief Complaint   Patient presents with     Follow Up     A1C 10/6/21         Video visit completed due to COVID 19 outbreak.     Was in the ER dx with kidney stone. Passed on own and is no longer having flank pain.  Had labs drawn during emergency department visit. Blood sugar 253, hemoglobin A1c 5.9. Does feel more thirsty over the last 1-2 months. No increased appetite.  Has been going to the bathroom more but feels like is due to increased water intake family history of DM. 3 grandparents with DM. Parents do not have DM. Is not walking as much as used to in the past.  Has noticed significant weight gain since starting Seroquel.    Review of Systems   Constitutional: Negative for fatigue.   HENT: Negative for ear pain, rhinorrhea and sore throat.    Eyes: Negative for visual " disturbance.   Respiratory: Negative for cough, chest tightness, shortness of breath and wheezing.    Cardiovascular: Negative for chest pain and palpitations.   Gastrointestinal: Negative for abdominal distention, abdominal pain, constipation, diarrhea, nausea and vomiting.   Endocrine: Positive for polydipsia and polyuria. Negative for cold intolerance, heat intolerance and polyphagia.   Musculoskeletal: Negative for arthralgias and myalgias.   Skin: Negative for rash.   Neurological: Negative for dizziness, light-headedness, numbness and headaches.   Psychiatric/Behavioral: Negative for dysphoric mood and sleep disturbance. The patient is not nervous/anxious.           Objective           Vitals:  No vitals were obtained today due to virtual visit.    Physical Exam  Constitutional:       Appearance: Normal appearance.   HENT:      Nose: No congestion.   Eyes:      General: Lids are normal.   Pulmonary:      Effort: No tachypnea, bradypnea or respiratory distress.   Skin:     Coloration: Skin is not ashen, cyanotic, jaundiced or pale.   Neurological:      Mental Status: He is alert.   Psychiatric:         Mood and Affect: Mood normal.         Speech: Speech normal.         Behavior: Behavior normal.             Video-Visit Details    Type of service:  Video Visit    Video End Time:2:07 PM    Originating Location (pt. Location): Home    Distant Location (provider location):  [facility] [place]     Platform used for Video Visit: [employer]

## 2021-10-27 ENCOUNTER — VIRTUAL VISIT (OUTPATIENT)
Dept: FAMILY MEDICINE | Facility: CLINIC | Age: 31
End: 2021-10-27
Payer: COMMERCIAL

## 2021-10-27 DIAGNOSIS — Z53.9 DIAGNOSIS NOT YET DEFINED: Primary | ICD-10-CM

## 2021-10-27 DIAGNOSIS — R73.03 PREDIABETES: Primary | ICD-10-CM

## 2021-10-27 PROCEDURE — 99213 OFFICE O/P EST LOW 20 MIN: CPT | Mod: 95 | Performed by: NURSE PRACTITIONER

## 2021-10-27 PROCEDURE — G0179 MD RECERTIFICATION HHA PT: HCPCS | Performed by: NURSE PRACTITIONER

## 2021-10-27 ASSESSMENT — ENCOUNTER SYMPTOMS
WHEEZING: 0
DIZZINESS: 0
FATIGUE: 0
SHORTNESS OF BREATH: 0
CONSTIPATION: 0
NUMBNESS: 0
ABDOMINAL DISTENTION: 0
LIGHT-HEADEDNESS: 0
SORE THROAT: 0
RHINORRHEA: 0
MYALGIAS: 0
HEADACHES: 0
DIARRHEA: 0
ABDOMINAL PAIN: 0
CHEST TIGHTNESS: 0
POLYPHAGIA: 0
COUGH: 0
NAUSEA: 0
PALPITATIONS: 0
ARTHRALGIAS: 0
VOMITING: 0
NERVOUS/ANXIOUS: 0
POLYDIPSIA: 1
SLEEP DISTURBANCE: 0
DYSPHORIC MOOD: 0

## 2021-10-28 ENCOUNTER — TELEPHONE (OUTPATIENT)
Dept: FAMILY MEDICINE | Facility: CLINIC | Age: 31
End: 2021-10-28
Payer: COMMERCIAL

## 2021-10-28 ENCOUNTER — VIRTUAL VISIT (OUTPATIENT)
Dept: PSYCHIATRY | Facility: CLINIC | Age: 31
End: 2021-10-28
Attending: NURSE PRACTITIONER
Payer: COMMERCIAL

## 2021-10-28 ENCOUNTER — TELEPHONE (OUTPATIENT)
Dept: PSYCHIATRY | Facility: CLINIC | Age: 31
End: 2021-10-28

## 2021-10-28 DIAGNOSIS — F12.21 CANNABIS USE DISORDER, SEVERE, IN EARLY REMISSION (H): ICD-10-CM

## 2021-10-28 DIAGNOSIS — Z79.899 MEDICATION MANAGEMENT: ICD-10-CM

## 2021-10-28 DIAGNOSIS — F15.21 AMPHETAMINE USE DISORDER, SEVERE, IN SUSTAINED REMISSION (H): ICD-10-CM

## 2021-10-28 DIAGNOSIS — F11.21 OPIOID USE DISORDER, SEVERE, IN SUSTAINED REMISSION (H): ICD-10-CM

## 2021-10-28 DIAGNOSIS — F25.0 SCHIZOAFFECTIVE DISORDER, BIPOLAR TYPE (H): ICD-10-CM

## 2021-10-28 DIAGNOSIS — F90.2 ATTENTION DEFICIT HYPERACTIVITY DISORDER (ADHD), COMBINED TYPE: ICD-10-CM

## 2021-10-28 DIAGNOSIS — G47.00 INSOMNIA, UNSPECIFIED TYPE: Primary | ICD-10-CM

## 2021-10-28 DIAGNOSIS — F17.200 NICOTINE USE DISORDER: ICD-10-CM

## 2021-10-28 DIAGNOSIS — F43.10 PTSD (POST-TRAUMATIC STRESS DISORDER): ICD-10-CM

## 2021-10-28 PROCEDURE — 99215 OFFICE O/P EST HI 40 MIN: CPT | Mod: 95 | Performed by: NURSE PRACTITIONER

## 2021-10-28 RX ORDER — QUETIAPINE FUMARATE 50 MG/1
150 TABLET, FILM COATED ORAL AT BEDTIME
Qty: 90 TABLET | Refills: 1 | Status: SHIPPED | OUTPATIENT
Start: 2021-10-28 | End: 2021-11-24

## 2021-10-28 RX ORDER — LISDEXAMFETAMINE DIMESYLATE 50 MG/1
CAPSULE ORAL
Qty: 30 CAPSULE | Refills: 0 | Status: SHIPPED | OUTPATIENT
Start: 2021-10-28 | End: 2021-11-24

## 2021-10-28 RX ORDER — FLUPHENAZINE DECANOATE 25 MG/ML
INJECTION, SOLUTION INTRAMUSCULAR; SUBCUTANEOUS
Qty: 6 ML | Refills: 4 | Status: SHIPPED | OUTPATIENT
Start: 2021-10-28 | End: 2021-12-22

## 2021-10-28 RX ORDER — CLONAZEPAM 1 MG/1
1 TABLET ORAL 2 TIMES DAILY PRN
Qty: 14 TABLET | Refills: 1 | Status: SHIPPED | OUTPATIENT
Start: 2021-11-02 | End: 2021-11-24

## 2021-10-28 RX ORDER — LITHIUM CARBONATE 300 MG/1
TABLET, FILM COATED, EXTENDED RELEASE ORAL
Qty: 120 TABLET | Refills: 0 | Status: SHIPPED | OUTPATIENT
Start: 2021-10-28 | End: 2021-11-24

## 2021-10-28 ASSESSMENT — PAIN SCALES - GENERAL: PAINLEVEL: NO PAIN (0)

## 2021-10-28 NOTE — PROGRESS NOTES
"VIDEO VISIT  Ayana Prasad is a 31 year old patient that has consented to receive services via billable video visit.      The patient has been notified of following:   \"This video visit will be conducted via a call between you and your physician/provider. We have found that certain health care needs can be provided without the need for an in-person physical exam. This service lets us provide the care you need with a video conversation. If a prescription is necessary we can send it directly to your pharmacy. If lab work is needed we can place an order for that and you can then stop by our lab to have the test done at a later time. Insurers are generally covering virtual visits as they would in-office visits so billing should not be different than normal.  If for some reason you do get billed incorrectly, you should contact the billing office to correct it and that number is in the AVS .    Patient will join video visit via:  email invite was sent ([employer] is preferred, but patient is unable to join via [employer])    If patient attempts to join the video via [employer] at appointment start time, but is unable to, they would prefer that the provider send them a video invitation via:   Send to preferred e-mail: [email or web address]      How would patient like to obtain AVS?:  MyChart    "
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
## 2021-10-28 NOTE — TELEPHONE ENCOUNTER
----- Message from Joana Hagen, [facility] sent at 10/19/2021 12:35 PM CDT -----  I will see him on 10/22.  I will call the case manger if I receive a verbal.  I'm remote and there is no way to obtain a hard copy of an VAL.  Also, if there is danger of decompensation isn't that a reason to contact the  relaying only the needed info on his refusal to take his medication as directed. Adding my manager.  Thanks all for your concern and responses on this.  [place]  ----- Message -----  From: Sonia Bolivar, [facility]  Sent: 10/18/2021   3:45 PM CDT  To: Mark Hammond, RN, #    Hi all,    Thanks for reaching out [place]! I don't see any Ashley ruling for Randall. I don't have a way to get commitment paperwork unless the patient or  provides it. I see that Scotty previously sent out paperwork to communicate with his  (thank you!), but it doesn't look like Randall returned it. [place]ito, would you happen to have authorization from Randall to ?     I agree that this is concerning. My experience has been that the patient still needs to provide consent for us to work with the  unless there is an emergency concern. Chapincito, if you don't have signed consent and the next time  you see them, can you assess for possible need to reach out to the ?     Alexandra  ----- Message -----  From: Regine Last APRN CNP  Sent: 10/17/2021  12:11 PM CDT  To: Mark Hammond RN, #    Thank you Chapincito for this message.  Indeed, this pt is new to me.  I am also on FMLA leave currently and only returned to work reduced hours.  Though he is on commitment (this was placed prior to coming to me), since he has not signed VAL to CM, we have not been able to get the record nor speak to CM.  My understanding nowadays is that even if pt is committed, since he is not jarvised (this is per his report, I have not been able to confirm anything), we can't force medication at this time  "unfortunately.  Hopefully he will show up on appt. Since he is very new pt (I believe I only saw him x2), I don't have much rapport nor have good grasp of his dx.    Also, I have not prescribed Adderall, so unless pt has been getting Adderall from others, he should not have been taking adderall (piper together with Vyvance).  Pt is prescribed Gabapentin for pain, not for psychiatric reason.      Also, do you know if he has recurrent substance use?  He was seen previously by different provider in this clinic and her impression was that most of his symptoms are due to substance use.  When I saw him last 2 times, he was not using any substance (and also reported he was taking the medication).  I assume if he is not taking medication, did he move from [facility]?  Because they were administering medications and if they are not administering medication, pt may not be able to stay.    I am ccing our clinic  here.  Alexandra, would you be able to get his commitment paper and also verify he is not jarvised?  If pt is on commitment, without signing VAL, can we talk to CM?    Thank you for giving me heads up on this pt.    regine  ----- Message -----  From: Joana Hagen, [facility]  Sent: 10/15/2021   4:55 PM CDT  To: Mark Hammond RN, Regine Last, BROOK[place]N CNP    Hi Mark,  I am a psychotherapist working with Prakash Prasad [MRN [id number]].  I saw Prakash today.  He tried to dismiss me however I was able to convince him to schedule at least 1 more therapy session.  Although I'm not seeing excitability or increased energy I do note he is more verbal than he usually is, is grandiose, and has delusional thoughts. Prakash hears voices and states \"they have been great. They are here for moral support\".  When I commented the voices aren't moral support he laughed stating \"everyone has quirks\".     Prakash reports he has stopped most of his medication.  He believes he was on too many different medications.  He states he " "does not have schizoaffective disorder and is not manic.  He added, I only have ADHD.He acknowledge \"my thoughts are going as fast as 10 Cheetahs\".    Prakash reports you are his psychiatrist and are new to him at 2 months. He has an appointment with you on October 28th.  It is only a matter of time before Prakash decompensates.  He is on a commitment with [facility].    Medications he stopped for 2 weeks ago:  Lithium  Gabapentin  Prolixin (injection)  Adderall  Vyvanse      He continues to take:  Lexapro  Klonopin  Seroquel    I note in his chart he has recently had some of these medications refilled which concerns me in light of his telling me he stopped many of these medications.     I can be reached via inIndelsul or email to [email or web address]    Thanks  Joana Man LICSW Nakajima, Yukiko, APRN CNP; Mark Hammond RN; Sonia Bolivar LICSW  Wanted to address your questions from message below;     Prakash denies any current substance abuse.  He continues to vape. I don't remember her mentioning [place] and that is not  the address listed in her file. Her address needs to be verified.  I don't think either address is correct. Prakash has told me he has open access to his medication and takes them on his own.  Prakash's  has been working to transition him to a supportive independent apartment.       He has had the diagnosis of Schizoaffective disorder since I have worked with him. He has always heard voices and atone point told him not to talk to me.     Hope this helps.         "

## 2021-10-28 NOTE — TELEPHONE ENCOUNTER
Diabetes Education Scheduling Outreach #1:    Call to patient to schedule. Voicemail full.    Plan for 2nd outreach attempt within 1 week.    Lissett Moncada  [place] [facility]  Diabetes and Nutrition Scheduling

## 2021-11-01 ENCOUNTER — TELEPHONE (OUTPATIENT)
Dept: FAMILY MEDICINE | Facility: CLINIC | Age: 31
End: 2021-11-01
Payer: COMMERCIAL

## 2021-11-01 ENCOUNTER — TELEPHONE (OUTPATIENT)
Dept: PSYCHIATRY | Facility: CLINIC | Age: 31
End: 2021-11-01

## 2021-11-01 NOTE — TELEPHONE ENCOUNTER
Diabetes Education Scheduling Outreach #2:    Call to patient to schedule. Voicemail full.      Lissett Moncada  [place] [facility]  Diabetes and Nutrition Scheduling

## 2021-11-01 NOTE — TELEPHONE ENCOUNTER
----- Message from BROOKLYN Calvin CNP sent at 10/28/2021  3:10 PM CDT -----  Regarding: VAL  Would you send VAL that pt signed on 9/26 to get a record from [place] [facility]?    Thank you!            =========================================      Writer faxed VAL to [employer] at [phone] using Epic communication.

## 2021-11-03 DIAGNOSIS — R11.2 NON-INTRACTABLE VOMITING WITH NAUSEA, UNSPECIFIED VOMITING TYPE: ICD-10-CM

## 2021-11-03 NOTE — TELEPHONE ENCOUNTER
"Requested Prescriptions   Pending Prescriptions Disp Refills     omeprazole (PRILOSEC) 20 MG DR capsule [Pharmacy Med Name: Omeprazole 20MG CPDR] 30 capsule 0     Sig: TAKE 1 CAPSULE BY MOUTH DAILY       PPI Protocol Passed - 11/3/2021  1:19 PM        Passed - Not on Clopidogrel (unless Pantoprazole ordered)        Passed - No diagnosis of osteoporosis on record        Passed - Recent (12 mo) or future (30 days) visit within the authorizing provider's specialty     Patient has had an office visit with the authorizing provider or a provider within the authorizing providers department within the previous 12 mos or has a future within next 30 days. See \"Patient Info\" tab in inbasket, or \"Choose Columns\" in Meds & Orders section of the refill encounter.              Passed - Medication is active on med list        Passed - Patient is age 18 or older        Passed - No active pregnacy on record        Passed - No positive pregnancy test in past 12 months           Prescription approved per [facility] Refill Protocol.  Tran,RN,BSN  Triage Nurse  [employer] [facility]: [facility]      "

## 2021-11-10 ENCOUNTER — VIRTUAL VISIT (OUTPATIENT)
Dept: BEHAVIORAL HEALTH | Facility: CLINIC | Age: 31
End: 2021-11-10
Payer: COMMERCIAL

## 2021-11-10 DIAGNOSIS — F30.9 MANIA (H): Primary | ICD-10-CM

## 2021-11-10 DIAGNOSIS — F25.9 SCHIZOAFFECTIVE DISORDER, CHRONIC CONDITION (H): ICD-10-CM

## 2021-11-10 DIAGNOSIS — F41.1 GAD (GENERALIZED ANXIETY DISORDER): ICD-10-CM

## 2021-11-10 PROCEDURE — 90834 PSYTX W PT 45 MINUTES: CPT | Mod: 95 | Performed by: SOCIAL WORKER

## 2021-11-11 ENCOUNTER — TELEPHONE (OUTPATIENT)
Dept: PSYCHIATRY | Facility: CLINIC | Age: 31
End: 2021-11-11
Payer: COMMERCIAL

## 2021-11-11 NOTE — TELEPHONE ENCOUNTER
----- Message from BROOKLYN Calvin CNP sent at 10/28/2021  3:18 PM CDT -----  Regarding: adult foster fax   gave me the number for adult foster, but this was phone number only ([phone]).  I left  to let us know fax number so medication change information can be shared.  I have not gotten the call back.  Once when we get a call back, could you pls fax AVS to adult foster?    Thank you!

## 2021-11-12 NOTE — PROGRESS NOTES
Progress Note    Patient Name: Ayana Prasad  Date: 10/15/2021       Service Type: Individual      Session Start Time: 5:06 Session End Time: 5:53     Session Length: 43 min    Session #: 16    Attendees: Client attended alone    Service Modality:  Video Visit:      Provider verified identity through the following two step process.  Patient provided:  Patient is known previously to provider    Telemedicine Visit: The patient's condition can be safely assessed and treated via synchronous audio and visual telemedicine encounter.      Reason for Telemedicine Visit: Patient has requested telehealth visit    Originating Site (Patient Location): Patient's home    lDistant Site (Provider Location): [facility]: [facility] and [place].    Consent:  The patient/guardian has verbally consented to: the potential risks and benefits of telemedicine (video visit) versus in person care; bill my insurance or make self-payment for services provided; and responsibility for payment of non-covered services.     Patient would like the video invitation sent by:  My Chart    Mode of Communication:  Video Conference via [employer]    As the provider I attest to compliance with applicable laws and regulations related to telemedicine.     Treatment Plan Last Reviewed:10/15/2021  PHQ-14:  Has not completed for review  AVA-14:     DATA  Interactive Complexity: No  Crisis: No       Progress Since Last Session (Related to Symptoms / Goals / Homework):   Symptoms: Worsening manic behavior. hallucination (hear's voices that are instructive).      Homework: Partially completed  Does not complete out side of therapy.       Episode of Care Goals: Minimal progress - PRECONTEMPLATION (Not seeing need for change); Intervened by educating the patient about the effects of current behavior on health.  Evoked information about reasons to continue behavior, express concern / recommendations, and  explored any change talk      Current / Ongoing Stressors and Concerns:   Disengaged. Appears tired.lays head down or hold head in her hand.  States she is taking her medications.  Enjoys  working with her [employer] worker.  Denied Drug use though at one point states she was using but declined to identify  her drug of choice. Denied auditory hallucinations. Discussed Prakash's desire for therapy. No progress made with  Prakash not wanting to share personal thoughts or emotions.         Treatment Objective(s) Addressed in This Session:    Use at least 2 coping skills for stress management in the next 2 weeks.    Reviewd medications.  Reviewed current symptoms.  Discussed best practice.  Discussed services.     Discussed readiness for change     Intervention:     Motivational Interviewing: client-centered; Explore and resolve ambivalence to elicite behavior change. Support  through exploration of motives to resolve of ambivalence to change.   Motivational interviewing to identify the thoughts and feelings that lead to continue anxiety and help her to develop  new thought patterns to aid in symptom management/reduction.           ASSESSMENT: Current Emotional / Mental Status (status of significant symptoms):   Risk status (Self / Other harm or suicidal ideation)   Patient denies current fears or concerns for personal safety.   Patient denies current or recent suicidal ideation or behaviors.  Denied suicidal or homicidal ideations.10/15/2021   Patient denies current or recent homicidal ideation or behaviors.   Patient denies current or recent self injurious behavior or ideation.   Patient denies other safety concerns.   Patient reports there has been no change in risk factors since their last session.     Patient reports there has been no change in protective factors since their last session.     A safety and risk management plan has been developed including: Patient consented to co-developed safety plan.  Safety and risk  management plan was completed.  Patient agreed to use safety plan should any safety concerns arise.  A copy was given to the patient.     Appearance:   Approprate   Eye Contact:   Poor   Psychomotor Behavior: Normal  Restless    Attitude:   Guarded  Indifferent Evasive   Orientation:   All   Speech    Rate / Production: Normal/ Responsive Normal     Volume:  Normal    Mood:    Depressed  Apathetic Ambivalent   Affect:    Blunted    Thought Content:  Denied   Thought Form:  Coherent  Denied Psychosis   Insight:    Good  and Fair      Medication Review:   No changes to current psychiatric medication(s)     Medication Compliance:   Yes     Changes in Health Issues:   None reported     Chemical Use Review:   Substance Use: Chemical use reviewed, no active concerns identified   History of meth use.   Last use 6 months ago.  Will begin harm reduction treatment wit [facility].     Tobacco Use: No change in amount of tobacco use since last session.  Reviewed information and resources for quitting. Repeats every session. Vapping much of the day.     Diagnosis:  Schizoaffective disorder-Manic      Collateral Reports Completed:   No release needs identified this session.    PLAN: (Patient Tasks / Therapist Tasks / Other)  Discuss with Prakash need to refer to another psychotherapist if she feel she is unable to make progress with this provider or make commitment to begin work on goals.  Report to the nearest ED if feeling unsafe.  Continue to engage in activities as able with friends. Continue to contact mother every day. Return in 2 weeks.      Joana Hagen, [facility]  11/10/2021                                                       ______________________________________________________________________    Treatment Plan    Patient's Name: Ayana Prasad  YOB: 1990    Date: 10/15/2021    DSM5 Diagnoses: 295.70  (F25.1) Schizoaffective Disorder Depressive Type  Psychosocial / Contextual Factors: in group home;  relationship issues.    WHODAS: 22    Referral / Collaboration:  No referral or collaboration needs identified this sessions.     Anticipated number of session or this episode of care: 24+      MeasurableTreatment Goal(s) related to diagnosis / functional impairment(s)  Goal 1:  Decrease or alleviate symptoms of depression by 2 points on the PHQ-9   I will know I've met my goal when I no longer have thoughts of suicide.    Objective #A Patient will Decrease frequency and intensity of feeling down, depressed, hopeless  Improve quantity and quality of night time sleep / decrease daytime naps  Identify negative self-talk and behaviors: challenge core beliefs, myths, and actions  Decrease thoughts that you'd be better off dead or of suicide / self-harm.   Patient will follow her safety plan; using skills to manage symptoms  Status: Reviewed  - Date: 10/15/2021     Intervention(s)  Therapist will provide client centered therapy and DBT: mindfulness and emotional regulation..   Patient will follow her safety plan; using skills to manage symptoms    Goal 2: Reduce/manage anxiety decreasing anxiety by 2 points on the AVA.    I will know I've met my goal when I can recognize and mange anxiety.      Objective #A Patient will identify at least 3 triggers for anxiety.    Status: Reviewed  - Date: 10/15/2021       Intervention(s)  Therapist will provide CBT: identify negative self defeating thoughts that exacerbate anxiousness and lead to suicidal ideations.      Patient has reviewed and agreed to the above plan.      Joana Hagen, [facility]  Reviewed   Date: 10/15/2021

## 2021-11-17 ENCOUNTER — MEDICAL CORRESPONDENCE (OUTPATIENT)
Dept: HEALTH INFORMATION MANAGEMENT | Facility: CLINIC | Age: 31
End: 2021-11-17
Payer: COMMERCIAL

## 2021-11-18 ENCOUNTER — TELEPHONE (OUTPATIENT)
Dept: PSYCHIATRY | Facility: CLINIC | Age: 31
End: 2021-11-18
Payer: COMMERCIAL

## 2021-11-18 NOTE — TELEPHONE ENCOUNTER
Health Call Center    Phone Message    May a detailed message be left on voicemail: yes   Vero  from [facility], calling to speak with someone about an update on this pt's care.  stated that pt's commitment is being reviewed and would like to speak with someone about this. Requesting a call back.   Reason for Call: Other: Call back     Action Taken: Message routed to:  Other: nursing pool    Travel Screening: Not Applicable

## 2021-11-21 ENCOUNTER — HEALTH MAINTENANCE LETTER (OUTPATIENT)
Age: 31
End: 2021-11-21

## 2021-11-22 DIAGNOSIS — F64.9 GENDER DYSPHORIA: ICD-10-CM

## 2021-11-22 NOTE — TELEPHONE ENCOUNTER
"Request for medication refill:    Providers if patient needs an appointment and you are willing to give a one month supply please refill for one month and  send a letter/MyChart using \".SMILLIMITEDREFILL\" .smillimited and route chart to \"P SMI \" (Giving one month refill in non controlled medications is strongly recommended before denial)    If refill has been denied, meaning absolutely no refills without visit, please complete the smart phrase \".smirxrefuse\" and route it to the \"P SMI MED REFILLS\"  pool to inform the patient and the pharmacy.    Jennifer Cerrato MA        "

## 2021-11-22 NOTE — TELEPHONE ENCOUNTER
Regine Last APRN CNP Snyder, David J RN  Caller: Unspecified (4 days ago,  2:40 PM)  Thank you so much.  This pt is so new to me, I don't have any sense if CM wants commitment extended.  Last time I talked to her on 10/28, she also noted the pt is also new to her and she didn't have a good sense.  Also asked CM to get medication record and impression from adult foster care as CM is also new to pt and he has been guarded and has not been able to provide accurate assessment. Looks like we haven't received anything.  I think adult foster may be the only place that may have some insight.           =========================================    Writer left a voice mail message for pt's  ([phone]), reviewing provider information, last and next appointment dates, and prompting a callback if needed.        =========================================    Writer received call from , who clarified that she spoke to pt's  and they were able to provide limited information and that she is looking for provider's support for pt's commitment or not, as CM team might not have sufficient information on their own to support commitment.    Per CM,  reports pt has been using substances, possibly doctor-shopping, getting meds from pharmacies other than what the [facility] uses. Pt reports to  that they don't take meds if plans to use.  CM knows pt has passed out on at least two occasions.    CM prepared pt to expect extension if provider doesn't feel has sufficient information to not support.    Pt would need to be seen by provider in 2-week timeframe of when court needs recommendation if provider supports commitment, so appointment would need to be 12/18-12/31.  CM agreed to have pt scheduled on 12/22 @ 2PM. This appointment would not be required if provider is not in support of commitment. Routed message to scheduling to convert held time.      FLORIDA identified that she is out of work 11/26 and 11/29, so VMM can be  left. Writer provided fax number so examiner's statement can be sent.      Routed to SUSANNAH Last.

## 2021-11-24 ENCOUNTER — TELEPHONE (OUTPATIENT)
Dept: PSYCHIATRY | Facility: CLINIC | Age: 31
End: 2021-11-24

## 2021-11-24 ENCOUNTER — VIRTUAL VISIT (OUTPATIENT)
Dept: PSYCHIATRY | Facility: CLINIC | Age: 31
End: 2021-11-24
Attending: NURSE PRACTITIONER
Payer: COMMERCIAL

## 2021-11-24 ENCOUNTER — VIRTUAL VISIT (OUTPATIENT)
Dept: BEHAVIORAL HEALTH | Facility: CLINIC | Age: 31
End: 2021-11-24
Payer: COMMERCIAL

## 2021-11-24 DIAGNOSIS — F16.20: ICD-10-CM

## 2021-11-24 DIAGNOSIS — F25.0 SCHIZOAFFECTIVE DISORDER, BIPOLAR TYPE (H): ICD-10-CM

## 2021-11-24 DIAGNOSIS — K21.9 GASTROESOPHAGEAL REFLUX DISEASE WITHOUT ESOPHAGITIS: Primary | ICD-10-CM

## 2021-11-24 DIAGNOSIS — F15.20 AMPHETAMINE USE DISORDER, MODERATE (H): ICD-10-CM

## 2021-11-24 DIAGNOSIS — F25.0 SCHIZOAFFECTIVE DISORDER, BIPOLAR TYPE (H): Primary | ICD-10-CM

## 2021-11-24 DIAGNOSIS — F11.20 OPIOID USE DISORDER, MODERATE, DEPENDENCE (H): Primary | ICD-10-CM

## 2021-11-24 DIAGNOSIS — F43.10 PTSD (POST-TRAUMATIC STRESS DISORDER): ICD-10-CM

## 2021-11-24 DIAGNOSIS — F17.200 NICOTINE USE DISORDER: ICD-10-CM

## 2021-11-24 DIAGNOSIS — F12.20 CANNABIS USE DISORDER, SEVERE, DEPENDENCE (H): ICD-10-CM

## 2021-11-24 DIAGNOSIS — F90.2 ATTENTION DEFICIT HYPERACTIVITY DISORDER (ADHD), COMBINED TYPE: ICD-10-CM

## 2021-11-24 PROCEDURE — 99215 OFFICE O/P EST HI 40 MIN: CPT | Mod: 95 | Performed by: NURSE PRACTITIONER

## 2021-11-24 PROCEDURE — 90834 PSYTX W PT 45 MINUTES: CPT | Mod: 95 | Performed by: SOCIAL WORKER

## 2021-11-24 PROCEDURE — G0179 MD RECERTIFICATION HHA PT: HCPCS | Performed by: NURSE PRACTITIONER

## 2021-11-24 RX ORDER — LITHIUM CARBONATE 300 MG/1
TABLET, FILM COATED, EXTENDED RELEASE ORAL
Qty: 120 TABLET | Refills: 0 | Status: SHIPPED | OUTPATIENT
Start: 2021-11-24 | End: 2021-12-22

## 2021-11-24 RX ORDER — CLONAZEPAM 1 MG/1
1 TABLET ORAL 2 TIMES DAILY PRN
Qty: 14 TABLET | Refills: 1 | Status: SHIPPED | OUTPATIENT
Start: 2021-11-24 | End: 2021-12-22

## 2021-11-24 RX ORDER — ESCITALOPRAM OXALATE 10 MG/1
10 TABLET ORAL DAILY
Qty: 90 TABLET | Refills: 0 | Status: SHIPPED | OUTPATIENT
Start: 2021-11-24 | End: 2022-01-21

## 2021-11-24 RX ORDER — LISDEXAMFETAMINE DIMESYLATE 50 MG/1
CAPSULE ORAL
Qty: 30 CAPSULE | Refills: 0 | Status: SHIPPED | OUTPATIENT
Start: 2021-11-27 | End: 2022-01-02

## 2021-11-24 RX ORDER — TESTOSTERONE CYPIONATE 200 MG/ML
INJECTION, SOLUTION INTRAMUSCULAR
Qty: 2 ML | Refills: 0 | Status: SHIPPED | OUTPATIENT
Start: 2021-11-24 | End: 2022-04-15

## 2021-11-24 RX ORDER — QUETIAPINE FUMARATE 50 MG/1
150 TABLET, FILM COATED ORAL AT BEDTIME
Qty: 90 TABLET | Refills: 1 | Status: ON HOLD | OUTPATIENT
Start: 2021-11-24 | End: 2021-12-10

## 2021-11-24 ASSESSMENT — PATIENT HEALTH QUESTIONNAIRE - PHQ9
SUM OF ALL RESPONSES TO PHQ QUESTIONS 1-9: 0
10. IF YOU CHECKED OFF ANY PROBLEMS, HOW DIFFICULT HAVE THESE PROBLEMS MADE IT FOR YOU TO DO YOUR WORK, TAKE CARE OF THINGS AT HOME, OR GET ALONG WITH OTHER PEOPLE: NOT DIFFICULT AT ALL
SUM OF ALL RESPONSES TO PHQ QUESTIONS 1-9: 0

## 2021-11-24 NOTE — Clinical Note
I left VM, but this pt is coming to ER after CM is revoking his commitment.  Interesting response as we discussed not pursing commitment extension and pt was happy and immediately called CM to ask to revoke his commitment.  Significant trauma hx.

## 2021-11-24 NOTE — TELEPHONE ENCOUNTER
On November 24, 2021, at 2:14 PM, writer called patient at mobile to confirm Virtual Visit. Writer unable to make contact with patient. Writer left detailed voice message for call back. [phone] left as call back number. CHRISTOPHER Maurice    On November 24, 2021, at 2:39 PM, writer called patient at mobile to confirm Virtual Visit. Writer unable to make contact with patient. A link to the video visit was sent to the patient's email address and mobile phone number. Karl Canada, EMT
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
## 2021-11-24 NOTE — TELEPHONE ENCOUNTER
Patient has not been seen by me since 5/12/2021, 2 months after initiation of testosterone for gender affirming hormone therapy. He has auditory hallucinations at baseline and there had been concerns raised by providers regarding increase in hallucinations since initiating T (this was something we were closely monitoring for). Ultimately felt hallucinations were at baseline and all providers were new to him at the time. He does regularly follow with psychiatry, psychology and his PCP -- though recent notes indicate concerns about level of engagement in therapy. Also note elevated glucose and A1c on recent labs with PCP. Is on Seroquel prescribed by Psychiatry. Did have ER visit in May 2021 following overdose.    Short refill sent (2 mo), routing to  to schedule follow up as patient very much overdue. Unable to continue to safely prescribe until follow up.     Glenda Juan MD

## 2021-11-24 NOTE — PATIENT INSTRUCTIONS
-Continue on current medication regimen for now.  -May use Narcan nasal spray for opioid overdose.    Your next appointment is scheduled on 12/22/2021 (Wed) at 2pm.    Thank you for coming to the [facility].    Lab Testing:  If you had lab testing today and your results are reassuring or normal they will be mailed to you or sent through [employer] within 7 days. If the lab tests need quick action we will call you with the results. The phone number we will call with results is # [phone] (home) . If this is not the best number please call our clinic and change the number.    Medication Refills:  If you need any refills please call your pharmacy and they will contact us. Our fax number for refills is [phone]. Please allow three business for refill processing. If you need to  your refill at a new pharmacy, please contact the new pharmacy directly. The new pharmacy will help you get your medications transferred.     Scheduling:  If you have any concerns about today's visit or wish to schedule another appointment please call our office during normal business hours [phone] (8-5:00 M-F)    Contact Us:  Please call [phone] during business hours (8-5:00 M-F).  If after clinic hours, or on the weekend, please call  [phone].    Financial Assistance [phone]  [facility] Billing [phone]  [place] Billing Office, [facility]: [phone]  [place] Billing [phone]  Medical Records [phone]      MENTAL HEALTH CRISIS NUMBERS:  For a medical emergency please call  911 or go to the nearest ER.     [facility]:   [facility] -[phone]   Crisis Residence [facility] -[phone]   Walk-In Counseling Center [facility] -[phone]   COPE 24/7 [place] Mobile Team -[phone] (adults)/[phone] (child)  [facility] - [phone]      [facility]:   [facility] -  [phone]   Walk-in counseling [facility] - [phone]   Walk-in counseling [facility] - [phone]   Crisis Residence [facility] - [phone]  Urgent Care Adult Mental Mgyyhz-[phone] mobile unit/ 24/7 crisis line    National Crisis Numbers:   National Suicide Prevention Lifeline: 4-669-222-TALK (215-859-4488)  Poison Control Center - 8-513-861-0655  Streamup/resources for a list of additional resources (SOS)  Trans Lifeline a hotline for transgender people 6-417-136-0815  The [employer] Project a hotline for LGBT youth 1-509.363.7503  Crisis Text Line: For any crisis 24/7   To: 166303  see www.crisistextline.org  - IF MAKING A CALL FEELS TOO HARD, send a text!         Again thank you for choosing [facility] and please let us know how we can best partner with you to improve you and your family's health.    You may be receiving a survey regarding this appointment. We would love to have your feedback, both positive and negative. The survey is done by an external company, so your answers are anonymous.

## 2021-11-25 ASSESSMENT — PATIENT HEALTH QUESTIONNAIRE - PHQ9: SUM OF ALL RESPONSES TO PHQ QUESTIONS 1-9: 0

## 2021-11-26 ENCOUNTER — HOSPITAL ENCOUNTER (EMERGENCY)
Facility: CLINIC | Age: 31
Discharge: ANOTHER HOSPITAL | End: 2021-11-27
Attending: EMERGENCY MEDICINE | Admitting: EMERGENCY MEDICINE
Payer: COMMERCIAL

## 2021-11-26 DIAGNOSIS — Z78.9 TRANSGENDER: ICD-10-CM

## 2021-11-26 DIAGNOSIS — F25.0 SCHIZOAFFECTIVE DISORDER, BIPOLAR TYPE (H): ICD-10-CM

## 2021-11-26 LAB
ANION GAP SERPL CALCULATED.3IONS-SCNC: 6 MMOL/L (ref 3–14)
BASOPHILS # BLD AUTO: 0.1 10E3/UL (ref 0–0.2)
BASOPHILS NFR BLD AUTO: 1 %
BUN SERPL-MCNC: 16 MG/DL (ref 7–30)
CALCIUM SERPL-MCNC: 10.1 MG/DL (ref 8.5–10.1)
CHLORIDE BLD-SCNC: 105 MMOL/L (ref 94–109)
CO2 SERPL-SCNC: 26 MMOL/L (ref 20–32)
CREAT SERPL-MCNC: 0.78 MG/DL (ref 0.52–1.25)
EOSINOPHIL # BLD AUTO: 0.2 10E3/UL (ref 0–0.7)
EOSINOPHIL NFR BLD AUTO: 2 %
ERYTHROCYTE [DISTWIDTH] IN BLOOD BY AUTOMATED COUNT: 14.9 % (ref 10–15)
GFR SERPL CREATININE-BSD FRML MDRD: >90 ML/MIN/1.73M2
GLUCOSE BLD-MCNC: 93 MG/DL (ref 70–99)
HCT VFR BLD AUTO: 42.6 % (ref 35–53)
HGB BLD-MCNC: 14 G/DL (ref 11.7–17.7)
IMM GRANULOCYTES # BLD: 0 10E3/UL
IMM GRANULOCYTES NFR BLD: 0 %
LITHIUM SERPL-SCNC: 0.9 MMOL/L
LYMPHOCYTES # BLD AUTO: 3.2 10E3/UL (ref 0.8–5.3)
LYMPHOCYTES NFR BLD AUTO: 25 %
MCH RBC QN AUTO: 26.6 PG (ref 26.5–33)
MCHC RBC AUTO-ENTMCNC: 32.9 G/DL (ref 31.5–36.5)
MCV RBC AUTO: 81 FL (ref 78–100)
MONOCYTES # BLD AUTO: 0.7 10E3/UL (ref 0–1.3)
MONOCYTES NFR BLD AUTO: 5 %
NEUTROPHILS # BLD AUTO: 8.4 10E3/UL (ref 1.6–8.3)
NEUTROPHILS NFR BLD AUTO: 67 %
NRBC # BLD AUTO: 0 10E3/UL
NRBC BLD AUTO-RTO: 0 /100
PLATELET # BLD AUTO: 358 10E3/UL (ref 150–450)
POTASSIUM BLD-SCNC: 3.6 MMOL/L (ref 3.4–5.3)
RBC # BLD AUTO: 5.27 10E6/UL (ref 3.8–5.9)
SARS-COV-2 RNA RESP QL NAA+PROBE: NEGATIVE
SODIUM SERPL-SCNC: 137 MMOL/L (ref 133–144)
TSH SERPL DL<=0.005 MIU/L-ACNC: 4.95 MU/L (ref 0.4–4)
WBC # BLD AUTO: 12.6 10E3/UL (ref 4–11)

## 2021-11-26 PROCEDURE — 90791 PSYCH DIAGNOSTIC EVALUATION: CPT

## 2021-11-26 PROCEDURE — 80178 ASSAY OF LITHIUM: CPT | Performed by: EMERGENCY MEDICINE

## 2021-11-26 PROCEDURE — 80048 BASIC METABOLIC PNL TOTAL CA: CPT | Performed by: EMERGENCY MEDICINE

## 2021-11-26 PROCEDURE — 85025 COMPLETE CBC W/AUTO DIFF WBC: CPT | Performed by: EMERGENCY MEDICINE

## 2021-11-26 PROCEDURE — 250N000013 HC RX MED GY IP 250 OP 250 PS 637: Performed by: EMERGENCY MEDICINE

## 2021-11-26 PROCEDURE — C9803 HOPD COVID-19 SPEC COLLECT: HCPCS | Performed by: EMERGENCY MEDICINE

## 2021-11-26 PROCEDURE — 84439 ASSAY OF FREE THYROXINE: CPT | Performed by: EMERGENCY MEDICINE

## 2021-11-26 PROCEDURE — U0005 INFEC AGEN DETEC AMPLI PROBE: HCPCS | Performed by: EMERGENCY MEDICINE

## 2021-11-26 PROCEDURE — 99285 EMERGENCY DEPT VISIT HI MDM: CPT | Mod: 25 | Performed by: EMERGENCY MEDICINE

## 2021-11-26 PROCEDURE — 99284 EMERGENCY DEPT VISIT MOD MDM: CPT | Performed by: EMERGENCY MEDICINE

## 2021-11-26 PROCEDURE — 84443 ASSAY THYROID STIM HORMONE: CPT | Performed by: EMERGENCY MEDICINE

## 2021-11-26 PROCEDURE — 36415 COLL VENOUS BLD VENIPUNCTURE: CPT | Performed by: EMERGENCY MEDICINE

## 2021-11-26 RX ORDER — HYDROXYZINE HYDROCHLORIDE 50 MG/1
50 TABLET, FILM COATED ORAL ONCE
Status: COMPLETED | OUTPATIENT
Start: 2021-11-26 | End: 2021-11-26

## 2021-11-26 RX ORDER — CLONAZEPAM 1 MG/1
1 TABLET ORAL 2 TIMES DAILY
Status: DISCONTINUED | OUTPATIENT
Start: 2021-11-26 | End: 2021-11-26

## 2021-11-26 RX ADMIN — HYDROXYZINE HYDROCHLORIDE 50 MG: 50 TABLET, FILM COATED ORAL at 23:26

## 2021-11-26 ASSESSMENT — ENCOUNTER SYMPTOMS
NERVOUS/ANXIOUS: 1
SLEEP DISTURBANCE: 1
HALLUCINATIONS: 1

## 2021-11-26 NOTE — TELEPHONE ENCOUNTER
Regine Last APRN CNP Snyder, David J RN  Caller: Unspecified (1 week ago)  Yes,  we are keepig 12/22 appt and you don't need to call CM.  I talked to CM after the visit on 11/24.  Pt and I talked and not to pursue commitment, but pt wanted to check in on 12/22.   But CM told me after the visit, pt called her and asked commitment to be revoked due to continued substance use, so she was in the process of revoking.  I have been checking to see if he is brought to ED, but clearly not.  I told CM to let us know where he will be brought to and she said [facility]. But I wonder with bed availability and all, if he is at elsewhere.     I feel in general, even after having VAL signed, we can't view [facility] [place], I think we need paper record sent.     Thank you.

## 2021-11-27 ENCOUNTER — HOSPITAL ENCOUNTER (INPATIENT)
Facility: CLINIC | Age: 31
LOS: 13 days | Discharge: NURSING, REHAB OR LONG-TERM CARE | End: 2021-12-10
Attending: PSYCHIATRY & NEUROLOGY | Admitting: PSYCHIATRY & NEUROLOGY
Payer: COMMERCIAL

## 2021-11-27 VITALS
DIASTOLIC BLOOD PRESSURE: 87 MMHG | RESPIRATION RATE: 16 BRPM | HEART RATE: 92 BPM | SYSTOLIC BLOOD PRESSURE: 140 MMHG | TEMPERATURE: 97 F | OXYGEN SATURATION: 94 %

## 2021-11-27 DIAGNOSIS — R07.89 MUSCULOSKELETAL CHEST PAIN: ICD-10-CM

## 2021-11-27 DIAGNOSIS — F99 INSOMNIA DUE TO OTHER MENTAL DISORDER: ICD-10-CM

## 2021-11-27 DIAGNOSIS — F51.05 INSOMNIA DUE TO OTHER MENTAL DISORDER: ICD-10-CM

## 2021-11-27 DIAGNOSIS — G25.71 AKATHISIA: ICD-10-CM

## 2021-11-27 DIAGNOSIS — F25.9 SCHIZOAFFECTIVE DISORDER, CHRONIC CONDITION WITH ACUTE EXACERBATION (H): Primary | ICD-10-CM

## 2021-11-27 DIAGNOSIS — R11.2 NON-INTRACTABLE VOMITING WITH NAUSEA, UNSPECIFIED VOMITING TYPE: ICD-10-CM

## 2021-11-27 DIAGNOSIS — R03.0 ELEVATED BLOOD PRESSURE READING WITHOUT DIAGNOSIS OF HYPERTENSION: ICD-10-CM

## 2021-11-27 PROBLEM — F31.64 BIPOLAR AFFECTIVE DISORDER, MIXED, SEVERE, WITH PSYCHOTIC BEHAVIOR (H): Status: ACTIVE | Noted: 2021-11-27

## 2021-11-27 PROBLEM — F29 PSYCHOSIS (H): Status: ACTIVE | Noted: 2021-11-27

## 2021-11-27 LAB
HCG UR QL: NEGATIVE
T4 FREE SERPL-MCNC: 0.87 NG/DL

## 2021-11-27 PROCEDURE — 81025 URINE PREGNANCY TEST: CPT | Performed by: PSYCHIATRY & NEUROLOGY

## 2021-11-27 PROCEDURE — 250N000011 HC RX IP 250 OP 636: Performed by: PSYCHIATRY & NEUROLOGY

## 2021-11-27 PROCEDURE — 99222 1ST HOSP IP/OBS MODERATE 55: CPT | Performed by: PSYCHIATRY & NEUROLOGY

## 2021-11-27 PROCEDURE — 128N000001 HC R&B CD/MH ADULT

## 2021-11-27 PROCEDURE — 250N000013 HC RX MED GY IP 250 OP 250 PS 637: Performed by: EMERGENCY MEDICINE

## 2021-11-27 PROCEDURE — 250N000013 HC RX MED GY IP 250 OP 250 PS 637: Performed by: PSYCHIATRY & NEUROLOGY

## 2021-11-27 RX ORDER — TESTOSTERONE CYPIONATE 200 MG/ML
0.2 INJECTION, SOLUTION INTRAMUSCULAR
Status: DISCONTINUED | OUTPATIENT
Start: 2021-12-01 | End: 2021-12-10 | Stop reason: HOSPADM

## 2021-11-27 RX ORDER — LITHIUM CARBONATE 300 MG/1
900 TABLET, FILM COATED, EXTENDED RELEASE ORAL AT BEDTIME
Status: DISCONTINUED | OUTPATIENT
Start: 2021-11-27 | End: 2021-12-10 | Stop reason: HOSPADM

## 2021-11-27 RX ORDER — CLONAZEPAM 1 MG/1
1 TABLET ORAL 2 TIMES DAILY PRN
Status: DISCONTINUED | OUTPATIENT
Start: 2021-11-27 | End: 2021-11-29

## 2021-11-27 RX ORDER — POLYETHYLENE GLYCOL 3350 17 G/17G
17 POWDER, FOR SOLUTION ORAL DAILY PRN
Status: DISCONTINUED | OUTPATIENT
Start: 2021-11-27 | End: 2021-12-10 | Stop reason: HOSPADM

## 2021-11-27 RX ORDER — ESCITALOPRAM OXALATE 10 MG/1
10 TABLET ORAL DAILY
Status: DISCONTINUED | OUTPATIENT
Start: 2021-11-27 | End: 2021-12-10 | Stop reason: HOSPADM

## 2021-11-27 RX ORDER — CLINDAMYCIN PHOSPHATE 10 MG/G
GEL TOPICAL 2 TIMES DAILY
Status: DISCONTINUED | OUTPATIENT
Start: 2021-11-27 | End: 2021-12-10 | Stop reason: HOSPADM

## 2021-11-27 RX ORDER — ACETAMINOPHEN 325 MG/1
650 TABLET ORAL EVERY 4 HOURS PRN
Status: DISCONTINUED | OUTPATIENT
Start: 2021-11-27 | End: 2021-12-01

## 2021-11-27 RX ORDER — ONDANSETRON 4 MG/1
4 TABLET, ORALLY DISINTEGRATING ORAL EVERY 8 HOURS PRN
Status: DISCONTINUED | OUTPATIENT
Start: 2021-11-27 | End: 2021-12-10 | Stop reason: HOSPADM

## 2021-11-27 RX ORDER — NYSTATIN 100000 U/G
CREAM TOPICAL 2 TIMES DAILY
Status: DISCONTINUED | OUTPATIENT
Start: 2021-11-27 | End: 2021-11-27

## 2021-11-27 RX ORDER — QUETIAPINE FUMARATE 50 MG/1
150 TABLET, FILM COATED ORAL AT BEDTIME
Status: DISCONTINUED | OUTPATIENT
Start: 2021-11-27 | End: 2021-11-29

## 2021-11-27 RX ORDER — OLANZAPINE 10 MG/2ML
10 INJECTION, POWDER, FOR SOLUTION INTRAMUSCULAR 3 TIMES DAILY PRN
Status: DISCONTINUED | OUTPATIENT
Start: 2021-11-27 | End: 2021-12-10 | Stop reason: HOSPADM

## 2021-11-27 RX ORDER — MAGNESIUM HYDROXIDE/ALUMINUM HYDROXICE/SIMETHICONE 120; 1200; 1200 MG/30ML; MG/30ML; MG/30ML
30 SUSPENSION ORAL EVERY 4 HOURS PRN
Status: DISCONTINUED | OUTPATIENT
Start: 2021-11-27 | End: 2021-12-10 | Stop reason: HOSPADM

## 2021-11-27 RX ORDER — CLONAZEPAM 1 MG/1
1 TABLET ORAL 2 TIMES DAILY PRN
Status: DISCONTINUED | OUTPATIENT
Start: 2021-11-27 | End: 2021-11-27 | Stop reason: HOSPADM

## 2021-11-27 RX ORDER — QUETIAPINE FUMARATE 50 MG/1
50-100 TABLET, FILM COATED ORAL 2 TIMES DAILY PRN
Status: DISCONTINUED | OUTPATIENT
Start: 2021-11-27 | End: 2021-11-29

## 2021-11-27 RX ORDER — GABAPENTIN 400 MG/1
1200 CAPSULE ORAL DAILY
Status: DISCONTINUED | OUTPATIENT
Start: 2021-11-27 | End: 2021-12-10 | Stop reason: HOSPADM

## 2021-11-27 RX ORDER — GABAPENTIN 300 MG/1
900 CAPSULE ORAL 2 TIMES DAILY
Status: DISCONTINUED | OUTPATIENT
Start: 2021-11-27 | End: 2021-12-10 | Stop reason: HOSPADM

## 2021-11-27 RX ORDER — GABAPENTIN 300 MG/1
900 CAPSULE ORAL 3 TIMES DAILY
Status: DISCONTINUED | OUTPATIENT
Start: 2021-11-27 | End: 2021-11-27

## 2021-11-27 RX ORDER — OLANZAPINE 10 MG/1
10 TABLET ORAL 3 TIMES DAILY PRN
Status: DISCONTINUED | OUTPATIENT
Start: 2021-11-27 | End: 2021-12-10 | Stop reason: HOSPADM

## 2021-11-27 RX ORDER — PANTOPRAZOLE SODIUM 20 MG/1
40 TABLET, DELAYED RELEASE ORAL DAILY
Status: DISCONTINUED | OUTPATIENT
Start: 2021-11-27 | End: 2021-12-10 | Stop reason: HOSPADM

## 2021-11-27 RX ORDER — LITHIUM CARBONATE 300 MG/1
300 TABLET, FILM COATED, EXTENDED RELEASE ORAL EVERY MORNING
Status: DISCONTINUED | OUTPATIENT
Start: 2021-11-27 | End: 2021-12-10 | Stop reason: HOSPADM

## 2021-11-27 RX ORDER — LITHIUM CARBONATE 300 MG/1
300 TABLET, FILM COATED, EXTENDED RELEASE ORAL EVERY 12 HOURS SCHEDULED
Status: DISCONTINUED | OUTPATIENT
Start: 2021-11-27 | End: 2021-11-27

## 2021-11-27 RX ORDER — GABAPENTIN 300 MG/1
300 CAPSULE ORAL
Status: DISCONTINUED | OUTPATIENT
Start: 2021-11-27 | End: 2021-11-27

## 2021-11-27 RX ORDER — HYDROXYZINE HYDROCHLORIDE 25 MG/1
25 TABLET, FILM COATED ORAL EVERY 4 HOURS PRN
Status: DISCONTINUED | OUTPATIENT
Start: 2021-11-27 | End: 2021-12-10 | Stop reason: HOSPADM

## 2021-11-27 RX ORDER — LITHIUM CARBONATE 300 MG/1
600 TABLET, FILM COATED, EXTENDED RELEASE ORAL AT BEDTIME
Status: DISCONTINUED | OUTPATIENT
Start: 2021-11-27 | End: 2021-11-27

## 2021-11-27 RX ORDER — TRAZODONE HYDROCHLORIDE 50 MG/1
50 TABLET, FILM COATED ORAL
Status: DISCONTINUED | OUTPATIENT
Start: 2021-11-27 | End: 2021-12-01

## 2021-11-27 RX ORDER — FLUPHENAZINE DECANOATE 25 MG/ML
25 INJECTION, SOLUTION INTRAMUSCULAR; SUBCUTANEOUS
Status: DISCONTINUED | OUTPATIENT
Start: 2021-12-01 | End: 2021-11-29

## 2021-11-27 RX ADMIN — QUETIAPINE FUMARATE 100 MG: 50 TABLET ORAL at 17:05

## 2021-11-27 RX ADMIN — GABAPENTIN 900 MG: 300 CAPSULE ORAL at 20:20

## 2021-11-27 RX ADMIN — CLONAZEPAM 1 MG: 1 TABLET ORAL at 01:07

## 2021-11-27 RX ADMIN — CLINDAMYCIN PHOSPHATE: 10 GEL TOPICAL at 20:20

## 2021-11-27 RX ADMIN — NICOTINE POLACRILEX 2 MG: 2 GUM, CHEWING BUCCAL at 11:28

## 2021-11-27 RX ADMIN — LITHIUM CARBONATE 900 MG: 300 TABLET, FILM COATED, EXTENDED RELEASE ORAL at 20:20

## 2021-11-27 RX ADMIN — PANTOPRAZOLE SODIUM 40 MG: 40 TABLET, DELAYED RELEASE ORAL at 11:28

## 2021-11-27 RX ADMIN — ONDANSETRON 4 MG: 4 TABLET, ORALLY DISINTEGRATING ORAL at 12:03

## 2021-11-27 RX ADMIN — ESCITALOPRAM OXALATE 10 MG: 10 TABLET ORAL at 11:24

## 2021-11-27 RX ADMIN — CLONAZEPAM 1 MG: 1 TABLET ORAL at 11:38

## 2021-11-27 RX ADMIN — CLINDAMYCIN PHOSPHATE: 10 GEL TOPICAL at 11:24

## 2021-11-27 RX ADMIN — CLONAZEPAM 1 MG: 1 TABLET ORAL at 20:22

## 2021-11-27 RX ADMIN — QUETIAPINE FUMARATE 150 MG: 50 TABLET ORAL at 20:20

## 2021-11-27 RX ADMIN — NICOTINE POLACRILEX 2 MG: 2 GUM, CHEWING BUCCAL at 17:07

## 2021-11-27 RX ADMIN — NICOTINE POLACRILEX 2 MG: 2 GUM, CHEWING BUCCAL at 20:21

## 2021-11-27 RX ADMIN — GABAPENTIN 900 MG: 300 CAPSULE ORAL at 11:28

## 2021-11-27 RX ADMIN — HYDROXYZINE HYDROCHLORIDE 25 MG: 25 TABLET, FILM COATED ORAL at 17:05

## 2021-11-27 RX ADMIN — NICOTINE POLACRILEX 2 MG: 2 GUM, CHEWING BUCCAL at 09:44

## 2021-11-27 RX ADMIN — LITHIUM CARBONATE 300 MG: 300 TABLET, FILM COATED, EXTENDED RELEASE ORAL at 11:23

## 2021-11-27 RX ADMIN — GABAPENTIN 1200 MG: 400 CAPSULE ORAL at 14:17

## 2021-11-27 RX ADMIN — NICOTINE POLACRILEX 2 MG: 2 GUM, CHEWING BUCCAL at 01:07

## 2021-11-27 ASSESSMENT — ACTIVITIES OF DAILY LIVING (ADL)
ORAL_HYGIENE: INDEPENDENT
DRESS: INDEPENDENT
HYGIENE/GROOMING: INDEPENDENT
LAUNDRY: WITH SUPERVISION

## 2021-11-27 ASSESSMENT — MIFFLIN-ST. JEOR
SCORE: 1755.52
SCORE: 1789.54

## 2021-11-27 NOTE — PROGRESS NOTES
Pt was sitting in the milieu and gave a verbal response to invite, though pt declined to join OT group today.       11/27/21 1311   Engagement   Intervention Group   Topic Detail Amador game for concentration, sequencing, cognitive processing, coping, symptom management, healthy leisure, frustration tolerance and socialization   Attendance Did not attend   Reason for Not Attending Refused

## 2021-11-27 NOTE — PLAN OF CARE
"    Initial Psychosocial Assessment    Type of CM visit: Initial Assessment, Clinical Treatment Coordinator Role Introduction, Offer Discharge Planning    Information obtained from: [x]Patient   []Chart review  []Collateral Contacts  []Court Website    Hospitalization information:   Ayana Prasad is a 31 year old who was admitted to [address]  on 11/27/2021 due to panic attacks and auditory hallucinations.     Patient Self-Assessment  Patient reported reason for admission: \"not sure\"      Patient reported symptoms of concern: []sadness    [x]anxiety     []anger    []poor sleep     []medications not working    []racing thoughts     []substance use     []agitation     [x]hearing voices     []hopelessness   []Eating concerns    []Self-injury      [x] Other   Comments: pt endorses experiencing panic attacks.    Current suicidal ideation:  [x]No    []Yes, no plan     []Yes, with plan (describe):          Comments:   Current homicidal ideation:  [x]No   [] Yes       Comments:     Legal Status at Admission: Voluntary/Patient has signed consent for treatment    Pt is under civil commitment in [facility] per court record.       History of Mental Health:  Describe current and past mental health symptoms present? : Pt endorses a hx of schizoaffective disorder-bipolar type, ADHD, PTSD, anxiety as well a hx of polysubstance abuse. Pt's roommates drove pt to the emergency department per recommendation of pt's mental health . Pt is currently under civil commitment in [facility]. Pt states that his  states that she plans to revoke pt's provisional discharge.  Per ED records \"Per chart review patient with multiple psychiatric hospitalizations starting 2013, last admission 2019.  Last [facility] 2019. Three suicide attempts (accidental overdose, carbon monoxide poisoning). Notes DOC as cannabis, but also used methamphetamine and opioid.  Never had substance use with injection. Hx of substance use " "treatment program. Also was in [facility] and completed, but recently in 2021 spring, was not accepted at [facility] or [facility] program. Psych critical item history includes 3 suicidal attempts, last 2019, trauma hx, multiple medication trials, multiple hospitalizations (estimates +25, first admitted in 2011 for overdose, last 2019 for haven and depression), substance use, substance treatment (2015 and 2017). Committed x 2 (2017 and 2019)\".  Pt is currently hospitalized on a voluntary basis. Pt denies SI/HI/SIB. Pt does endorse command hallucinations and states that voices are telling him to kill himself. Pt states that this is distressing to him and exacerbating his anxiety.     Do you understand your mental health diagnosis? YES [x]   NO []    History of psychiatric hospitalizations?  YES [x]     NO  []  Details:  \"at least a dozen\". Pt states most recent admission was in 2019.    If YES, within the last 30 days? YES []     NO  [x]    History of commitment?  YES [x]     NO  []    Details:  Pt is currently under commitment in [facility]   History of ECT?  YES []     NO  [x]    Details:     History of Substance Use Disorder:  Have you used alcohol or substances in the past 12 months? YES []/ NO []              If Yes, Pt appears very guarded and states that he is not using alcohol or drugs currently. Pt does have a hx of polysubstance abuse and has participated in several [facility] treatment programs. Pt endorse historically using methamphetamines and marijuana.     Would you like a substance use disorder evaluation? YES [] / NO [x]    Previous Treatment? YES [x]/ NO []  Details: Hx of multiple [facility] treatment programs. Pt did not disclose further at this time.     Significant Life Events  (Illness, Death, Loss): Pt denies.       Is there a history of abuse or psychological trauma:    [x]Denies       []Yes, present (type):         []Yes, past (type):        []Patient declined to answer    Identify current " "stressors:    []financial,    []legal issues,    []homelessness,    []housing,     []recent loss,    []relationships,    []substance use concerns,    []medical     []unemployment     []employment  concerns    []isolation,    []lack of resources,     []out of home placements,     []parenting issues     []domestic violence     [x]other: Pt declined experiencing current life stressors at this time.   Comments:       Living Situation:     []House/apt    [x]Group Home    []IRTS     []Homeless     []Assisted Living     []Nursing home    []Lives alone    []Lives with :                         []Other:          Family Composition: Pt states that he currently resides at [facility] in [place], [place]     Children, ages and current location if minor: n/a     Relationship status  []Single     []     []     [x]       []Significant Other   []Other:     Educational Background:  []Less Than High School     []High School     []GED     [x]College       Cognitive/learning concerns: Pt denies, pt does have a hx of ADHD per chart review.    Financial Status: [] Employed, status and location:  []Unemployment    []County Assistance     [x]SSI/SSDI      [x]Waivered services    []Other:    Legal status(present):   [x]Voluntary, []72-hour hold, [x]Commitment, []Guardianship, []Revocation, []Stay of commitment,    Details:    Other legal issues identified:  [x]None, []Arrest,  []Probation/[place],  []Driving under influence,  []Incarceration,  []Sexual offense (level):   []Child Protective Services,      []Other:      Details:    Ethnic/Cultural considerations:  Pt denies     Spiritual considerations: Pt denies      Service History:  [x]No     []Yes: details:    Social Functioning (organization, interests) and strengths: \"not sure\".     Current Treatment Providers Are:     NO Name, Agency, and phone   Psychiatrist  [] Reg[facility] @ [facility]    Psychotherapist  [] \"[facility]\". Pt was unable to state name of " therapist.    [facility] worker  [x]      [] Senia Arreaga @ [facility]   Waivered Services  [] [facility]I waiver : [facility]  [x]    Day Treatment/PHP/[facility] trtmt  [x]    Group Home/AFC/AL  [] Pt states that he currently lives at [facility] in [place], [place]     [x]    Other:  []            Social Service Assessment of identified patient needs and plan to meet those needs: Prakash identifies as male and uses he/him pronouns. Prakash is a 31 year old male admitted to [address] from [facility] ED. Pt presented in the ED with roommates from group [facility] per pt's 's request. Pt states that he is experiencing an increase in anxiety and states having frequent panic attacks. Pt also endorses experiencing auditory hallucinations that are command in nature. Pt states that voices tell him to kill himself. Pt states that this is distressing to him and increases his anxiety symptoms. Pt also has a hx of poly substance abuse. Per ED documentation, pt ran out of Klonopin prescription. Per ED it states that pt apparently picked up klonopin script on 11/12. It is also documented that group home states that they have found prescription drugs in room that are not his. During initial assessment, pt denies current substance abuse. Pt did endorse remote hx of methamphetamine and marijuana use. Pt was guarded during initial assessment and presents with a flat affect. Pt is restless and appears highly anxious. Pt denies SI/HI/SIB. Pt is currently under civil commitment in [facility]. Pt states that his mental health : Senia Arreaga encouraged pt to present to the emergency department. Pt reports that Senia is planning to revoke pt's provisional discharge. Per court website, intent to revoke has not yet been filed.     Social work to coordinate with interdisciplinary team and make referrals as indicated. Further collateral to be obtained from mental health  to obtain  more information regarding substance use and confirming that she is planning to file intent to revoke.       Possible discharge plan: Psychiatrically stabilize and discharge back to group home with outpatient mental health supports.     Barriers: Medication Management, Symptom Stabilization, Coordination of Care

## 2021-11-27 NOTE — PLAN OF CARE
"  Problem: Sleep Disturbance  Goal: Adequate Sleep/Rest  Outcome: Improving     Problem: Suicidal Behavior  Goal: Suicidal Behavior is Absent or Managed  Outcome: Improving     Problem: Activity and Energy Impairment (Anxiety Signs/Symptoms)  Goal: Optimized Energy Level (Anxiety Signs/Symptoms)  Outcome: Improving     Problem: Mood Impairment (Anxiety Signs/Symptoms)  Goal: Improved Mood Symptoms (Anxiety Signs/Symptoms)  Outcome: Improving   Patient is a 31 year old transgender male [facility]. Patient appears calm with flat blunted affect, mood is sad and depressed. Patient appears slow to respond to assessment questions and very evasive, almost muted. Patient is pretty much unreliable historian. Example when patient was asked why he feels he's here this morning, patient states \"I don't know why I'm here.\"  Patient denies being suicidal, denies previous suicide attempts, denies illicit  drug (street drug) use when it was documented patient had history of cannabis and other substance (polysubstance) abuse and history history of 3 suicidal attempts (see chart review). Patient however endorsed a disturbing auditory hallucinations but would not elaborate on that. Patient also rated anxiety as 5/10 and depression as 0/10. Denies SI/HI and SIB. When it was obvious that patient would not answer assessment questions truthfully, he was allowed to go to his room. Patient is sleeping at this time, Will continue to monitor. VSS. Patient refused to submit urine for UDS. Patient has several medication allergies including Haldol, Risperidone, Seroquel, Tramadol, Percocet, Prednisone etc.    Earle Mancini, RAMÍREZ, RN, APRN, CNS, AGCNS-BC.  "

## 2021-11-27 NOTE — PHARMACY-ADMISSION MEDICATION HISTORY
Pharmacy Note - Admission Medication History    Pertinent Provider Information:     Patient reports that he is due for the Prolixin injection and testosterone injection on Wednesday (12/1/21). Left voicemail with group home ([phone]) to call me back to confirm these dates.    Update 11/29/21, unable to confirm dates with group home after 3 attempts. Patient appeared to be a reliable historian during medication history interview; so will document next due dates of both injections as 12/01/21.    Confirmed medication doses with [facility] ([phone])   ______________________________________________________________________    Prior To Admission (PTA) med list completed and updated in EMR.       PTA Med List   Medication Sig Last Dose     clindamycin (CLINDAMAX) 1 % external gel Apply topically 2 times daily Past Week at Unknown time     clonazePAM (KLONOPIN) 1 MG tablet Take 1 tablet (1 mg) by mouth 2 times daily as needed for anxiety 11/26/2021 at Unknown time     escitalopram (LEXAPRO) 10 MG tablet Take 1 tablet (10 mg) by mouth daily 11/26/2021 at AM     fluPHENAZine decanoate (PROLIXIN) 25 MG/ML injection INJECT 1ML (25MG) INTRAMUSCULARLY EVERY 14 DAYS 11/17/2021     gabapentin (NEURONTIN) 300 MG capsule TAKE 3 CAPSULES (900MG) AND TAKE 4 CAPSULES (1200MG ) BY MOUTH MIDDAY DAY AND TAKE 3 CAPSULES (900MG) BY MOUTH EVERY EVENING 11/26/2021 at Unknown time     lisdexamfetamine (VYVANSE) 50 MG capsule TAKE 1 CAPSULE BY MOUTH EVERY MORNING 11/26/2021 at AM     lithium ER (LITHOBID) 300 MG CR tablet Take 1 tablet (300 mg) by mouth every morning AND 3 tablets (900 mg) At Bedtime. 11/26/2021 at Unknown time     nicotine (NICORETTE) 2 MG gum Place 1 each (2 mg) inside cheek as needed for smoking cessation 11/26/2021 at Unknown time     omeprazole (PRILOSEC) 20 MG DR capsule TAKE 1 CAPSULE BY MOUTH DAILY 11/26/2021 at AM     ondansetron (ZOFRAN ODT) 4 MG ODT tab Take 1 tablet (4 mg) by mouth every 8  hours as needed 11/26/2021 at Unknown time     QUEtiapine (SEROQUEL) 50 MG tablet Take 3 tablets (150 mg) by mouth At Bedtime 11/26/2021 at PM     testosterone cypionate (DEPOTESTOSTERONE) 200 MG/ML injection INJECT 0.2 ML SUBCUTANEOUSLY ONCE WEEKLY 11/24/2021       Information source(s): Patient, Patient's pharmacy, [place]  Method of interview communication: in-person    Summary of Changes to PTA Med List  New: --  Discontinued: adderall (per patient and chart review this was discontinued), nystatin cream (per patient, and pharmacy states this was last filled 07/2021)  Changed: --    Patient was asked about OTC/herbal products specifically.  PTA med list reflects this.    Allergies were reviewed, assessed, and updated with the patient.      Patient does not use any multi-dose medications prior to admission.    The information provided in this note is only as accurate as the sources available at the time of the update(s).    Thank you for the opportunity to participate in the care of this patient.    Becki Reddy [facility]  11/27/2021 11:41 AM

## 2021-11-27 NOTE — SAFE
Ayana Prasad  November 26, 2021  SAFE Note    Critical Safety Issues: Hx of schizoaffective disorder, bipolar type and polysubstance abuse, presents voluntarily reporting high anxiety, out of Klonopin, and command hallucinations to kill himself. Denies suicidal ideation.       Current Suicidal Ideation/Self-Injurious Concerns/Methods: None - N/A      Current or Historical Inappropriate Sexual Behavior: No      Current or Historical Aggression/Homicidal Ideation: History of Violence towards authorities (per records)      Triggers: out of Klonopin    Updated care team: Yes    For additional details see full LMHP assessment.       BOB RodriguezSW

## 2021-11-27 NOTE — CONSULTS
"11/26/2021  Ayana Prasad 1990     [facility] Crisis Assessment    Patient was assessed: remote  Patient location: [facility]    Referral Data and Chief Complaint  Ayana Prasad \"Prakash\" is a 31 year old who uses he/him pronouns. Patient presented to the ED via roommates and was referred to the ED by his . Patient is presenting to the ED for the following concerns: panic attack.      Informed Consent and Assessment Methods  Patient is his own guardian. Writer met with patient and explained the crisis assessment process, including applicable information disclosures and limits to confidentiality, assessed understanding of the process, and obtained consent to proceed with the assessment. Patient was observed to be able to participate in the assessment as evidenced by verbalized understanding and agreement. Assessment methods included conducting a formal interview with patient, review of medical records, collaboration with medical staff, and obtaining relevant collateral information from family and community providers when available.    Narrative Summary of Presenting Problem and Current Functioning  What led to the patient presenting for crisis services, factors that make the crisis life threatening or complex, stressors, how is this disrupting the patient's life, and how current functioning is in comparison to baseline. How is patient presenting during the assessment.     Patient appeared to be resting at the start of the visit, and was startled by writer's voice. He was wearing earbuds, and also had the television on. Patient is oriented x3, but unable to provide the correct date. There are long pauses before he responds to writer's questions and he provides very brief responses. He does disclose that he lives in a group home and is able to provide the group home phone number. He does not otherwise appear to be forthcoming with information, nor does he appear motivated to participate in the interview. He " "endorses high anxiety and appears to be falling asleep between questions.      Patient reports that he was having a panic attack, then quickly adds that his  told him to come in because she is revoking his civil commitment. He states that the panic attack was triggered by coming to the hospital. She wanted him to come to the hospital because of his drug use, and had told him to come in Wednesday, but he told her he wanted to do it Friday. He will not say what drugs he is or has used. He denies any drug or alcohol use today. He states he has \"bad anxiety,\" and takes Klonopin. He denies any other psychotropic medications, though does later tell the MD that he is also prescribed Seroquel, which he took earlier today hoping it would help with his anxiety. He denies suicidal ideation. Reports previous suicide attempt \"long ago.\" Unwilling to provide additional information. Patient endorsed auditory hallucinations, though either cannot or will not elaborate. There were no overt signs of psychosis observed during the interaction with writer. Patient later disclosed to MD that he is experiencing command hallucinations to kill himself. Patient also endorsed sleep difficulty, which he denied to writer, and during his most recent psychiatric visit on 11/24/21. During this last visit patient also indicated that his anxiety and mood were okay, and endorsed abuse of opiates, ectasy, cannabis, and methamphetamines.     Visit notes from patient's psychiatric provider, Regine Last, on 11/24/21, indicate that patient had planned to present to [facility] for admission due to ongoing polysubstance abuse. It appears that the aforementioned provider may have contacted an [facility] point person to request direct admission.     History of the Crisis  Duration of the current crisis, coping skills attempted to reduce the crisis, community resources used, and past presentations.    Patient has a history of schizoaffective " disorder, bipolar type, PTSD, ADHD, borderline personality disorder, and polysubstance abuse. He is currently followed by psychiatry @ [facility], and also has a therapist with us, with whom he appears to be minimally engaged. He is currently under MI commitment with [facility] through 2/4/22. Review of records indicates that his  may be preparing to either petition for continued commitment or revoke his provisional discharge (PD), information is not clear. There does no evidence of an apprehend and hold order on the court website, and so it does not seem likely they will be revoking the PD. Patient has a history of multiple psychiatric hospitalizations, the most recent of which appears to have been 10/22/19 - 12/17/19 at [facility].     Records indicate that the chronic, occasional auditory hallucinations patient has endorsed may actually be flashbacks from previous trauma.     Collateral Information  The following information was received from Domenica whose relationship to the patient is group home staff. Information was obtained via telephone. Their phone number is # [phone] and they last had contact with patient on earlier tonight.    How long have they been a resident: since January 22, 2021    Why does patient live in the facility: Needs reminders for ADLs, medication administration    Significant changes to environment: Unable to obtain    Legal status (Commitment, probation, guardian, etc.): Commitment    Has the patient made any comments about wanting to kill themselves/others: Unable to obtain    What happened today: He's getting all these medications and they don't know how. He uses [facility], instead of theirs. They found an unknown drug in his room. They are revoking his commitment. Domenica states that patient was alert, oriented, and cognitively intact during their last interaction earlier this evening. She reports being confused by writer's report of his current mental status.     Domenica was  "very short with writer, questioned writer calling for information when all of patient's providers are within [facility], and repeatedly insisted writer obtain information from patient (even after writer explained patient was unable/unwilling to participate), and his  (whom is out of the office, which she was already aware of). Writer explained the reason for the call, and provided rationale for the information being requested. Domenica indicated that she would like to get the information and call writer back. Domenica called back a few minutes later, and said she did not have access to the information. She stated that she would fax a copy of patient's medication list to [facility] tomorrow, and was provided with the fax number. Writer explained that since the group home is responsible for administering his medications this information was needed tonight, to which Domenica became even more upset. Writer requested to speak with another staff member who could provide the necessary information, or her supervisor, which lead to Doemnica yelling at writer and ultimately hanging up the phone. Writer was able to find an alternate contact number ([phone]) for the group home, which unfortunately writer learned also belongs to Domenica after calling it.     What is different about patient's functioning: Unable to obtain    Concern about alcohol/drug use: Yes expressed concerns regarding misuse of prescrition narcotics    If d/c is recommended, can patient return to current living situation: Unable to obtain.    If no, what needs to happen in order for patient to return: Unable to obtain.    Risk Assessment  Risk of Harm to Self   ESS-6  1.a. Over the past 2 weeks, have you had thoughts of killing yourself? No  1.b. Have you ever attempted to kill yourself and, if yes, when did this last happen? Yes \"long time ago\"   2. Recent or current suicide plan? No   3. Recent or current intent to act on ideation? No  4. Lifetime psychiatric " hospitalization? Yes  5. Pattern of excessive substance use? Yes  6. Current irritability, agitation, or aggression? No  Scoring note: BOTH 1a and 1b must be yes for it to score 1 point, if both are not yes it is zero. All others are 1 point per number. If all questions 1a/1b - 6 are no, risk is negligible. If one of 1a/1b is yes, then risk is mild. If either question 2 or 3, but not both, is yes, then risk is automatically moderate regardless of total score. If both 2 and 3 are yes, risk is automatically high regardless of total score.     Score: 2, mild risk    The patient has the following risk factors for suicide: substance abuse, prior suicide attempt and psychosis    Is the patient experiencing current suicidal ideation: No    Is the patient engaging in preparatory suicide behaviors (formulating how to act on plan, giving away possessions, saying goodbye, displaying dramatic behavior changes, etc)? No    Does the patient have access to firearms or other lethal means? Unable to assess.     The patient has the following protective factors: voluntarily seeking mental health support, displays resiliency , established relationship community mental health provider(s), future focused thinking, safe/stable housing and engagement in school    Support system information: Established relationships with outpatient mental jn supports, including psychiatry, therapy, and case management.      Patient strengths: voluntarily seeking treatment today    Does the patient engage in non-suicidal self-injurious behavior (NSSI/SIB)? Unable to assess.     Is the patient vulnerable to sexual exploitation?  Yes when under the influence of mood-altering substances     Is the patient experiencing abuse or neglect? no    Is the patient a vulnerable adult? Yes    Risk of Harm to Others  The patient has the following risk factors of harm to others: history of violence, records indicate that this was directed towards authorities    Does  the patient have thoughts of harming others? No    Is the patient engaging in sexually inappropriate behavior? no     Current Substance Abuse  Is there recent substance abuse? Yes, records indicate that patient reported use of methamphetamines for two days at his last psychiatric appointment. He also reported using dilaudid, cannabis, and ectasy.     Was a urine drug screen or blood alcohol level obtained: No    Current Symptoms/Concerns  Symptoms  Attention, hyperactivity, and impulsivity symptoms present: No    Anxiety symptoms present: Yes: Panic attacks and Generalized Symptoms: Agitation, Avoidance and Physiological anxiety - sweating, flushing, shaking, shortness of breath, or racing heart      Appetite symptoms present: No     Behavioral difficulties present: No     Cognitive impairment symptoms present: Yes: Judgment/Insight and Orientation    Depressive symptoms present: Unable to assess.     Eating disorder symptoms present: No    Learning disabilities, cognitive challenges, and/or developmental disorder symptoms present: Unable to assess.      Manic/hypomanic symptoms present: Unable to assess.    Personality and interpersonal functioning difficulties present : Unable to assess.     Psychosis symptoms present: None observed, though patient endorse command hallucinations       Sleep difficulties present: No    Substance abuse disorder symptoms present: Unable to assess.      Trauma and stressor related symptoms present: Unable to assess.      Mental Status Exam   Affect: Appropriate   Appearance: Appropriate    Attention Span/Concentration: Inattentive?    Eye Contact: Avoidant   Fund of Knowledge: Appropriate    Language /Speech Content: Fluent   Language /Speech Volume: Soft    Language /Speech Rate/Productions: Slow    Recent Memory: Poor   Remote Memory: Poor   Mood: Anxious    Orientation to Person: Yes    Orientation to Place: Yes   Orientation to Time of Day: Answer: 10 pm (it is 8 pm)     Orientation to Date: Answer: November 2022    Situation (Do they understand why they are here?): Yes    Psychomotor Behavior: Normal    Thought Content: Clear   Thought Form: Intact     Mental Health and Substance Abuse History  History  Current and historical diagnoses or mental health concerns: Schizoaffective disorder, bipolar type, ADHD, PTSD, anxiety. borderline personality disorder, and polysubstance abuse    Prior MH services (inpatient, programmatic care, outpatient, etc): Yes multiple psychiatric hospitalizations, most recently 2020    History of substance abuse: Yes polysubstance abuse    Prior KIERRA services (inpatient, programmatic care, detox, outpatient, etc): Unable to assess.     History of commitment: Yes currently under commitment with [facility] through 2/4/2022    Family history of MH/KIERRA: Unable to assess.     Trauma history: Yes noted in records, not assessed    Medication  Psychotropic medications: patient only reports [facility]    Current [facility] Team  Primary Care Provider: BROOKLYN Edwards, CNP @ [facility], last visit was 10/27/21    Psychiatrist: BROOKLYN Calvin, CNP @ [facility], last visit was 11/24/21; per records was also seeing Dr. Burrows @ [facility], last visit 8/13/21    Therapist: JULISA Vanegas @ [facility], last visit was 11/10/21    : Senia Arreaga @ [facility] ([phone])    CTSS or ARMHS: Unknown    ACT Team: No    Release of Information  Was a release of information signed: No. Reason: patient not asked due to mental status    Biopsychosocial Information  Socioeconomic Information  Current living situation: Lives in a group home ([facility], [phone]) in [place]     Employment/income source: Disabled, receiving SSI    Relevant legal issues: Unable to assess.     Cultural, Sikhism, or spiritual influences on mental health care: Identifies  as Pentecostal    Active in the  or a : No    Relevant Medical Concerns   Patient identifies concerns with completing ADLs? No     Patient can ambulate independently? Yes     Other medical concerns? No     History of concussion or TBI? No      Diagnosis    ICD-10-CM    1. Schizoaffective disorder, bipolar type (H)  F25.0        Therapeutic Intervention  The following therapeutic methodologies were employed when working with the patient: active listening and assessing dimensions of crisis. Patient response to intervention: minimally engaged, disinterested.    Disposition  Recommended disposition: Inpatient Mental Health      Reviewed case and recommendations with attending provider. Attending Name: Dr. Dominguez    Attending concurs with disposition: Yes    Patient concurs with disposition: Yes      Final disposition: Inpatient mental health .     Inpatient Details:  Is patient admitted voluntarily:Yes  Patient aware of potential for transfer if there is not appropriate placement? Yes   Patient is willing to travel outside of the [facility] for placement? No    Behavioral Intake Notified? Yes: Date: 11/26/21 Time: 9:26 pm.     Clinical Substantiation of Recommendations   Rationale with supporting factors for disposition and diagnosis.   Patient presents voluntarily seeking admission for increased anxiety and command hallucinations. He appeared tired and sleeping to writer, and was anxious and restless with the physician. Writer did not observe any overt symptoms of psychosis. He indicates that he is out of Klonopin, which may be the motivation behind his decision to pursue care this evening.  There is clear evidence of concern regarding his polysubstance abuse, including suspected misuse of prescription medications, however, per records, he did not desire treatment as of 11/24. He was recently prescribed Narcan at his last psychiatry appointment, unclear whether or not he has picked this up.     Assessment  Details  Patient interview started at: 7:58 pm and completed at: 9:30 pm.    Total duration spent on the patient case in minutes: 1.50 hrs     CPT code(s) utilized: 20217 - Psychotherapy for Crisis - 60 (30-74*) min     Aftercare and Safety Planning  Follow up plans with MH/KIERRA services: Yes as per recommendations at time of discharge; has f/u with psychiatry on 12/22/21      Aftercare plan placed in the AVS and provided to patient: No. Rationale: patient on bed list for admission    JULISA Rodriguez

## 2021-11-27 NOTE — ED PROVIDER NOTES
ED Provider Note  [facility]      History     Chief Complaint   Patient presents with     Anxiety     HPI  Ayana Prasad is a 31 year old transgender male (preferred he/him/his pronouns) with history of schizoaffective disorder, bipolar type, ADHD, PTSD, polysubstance abuse presenting to the ED for anxiety.  He says he has been feeling anxious for the past 2 days.  He can't say why.  He says he has klonopin for anxiety but ran out 2 days ago and couldn't get the refill because the pharmacy was closed.  (apparently he has picked up klonopin script on 11/12/21).  He lives in a group home and was brought here by a roommate.  He denies si.  He says he has been hearing more voices for the past 2 days and that they say bad things.  He didn't want to talk about but admits that they are telling him to kill himself which he says is not his baseline.  He says his sleep hasn't been good for the past couple of days.  He says he took his bedtime seroquel at 4 pm today to try and help the anxiety but he still feels really anxious. Per his group home they have found prescription drugs in his room that are not his.  He also uses [facility] instead of the group home pharmacy.      Per chart review patient with multiple psychiatric hospitalizations starting 2013, last admission 2019.  Last [facility] 2019. 3 suicide attempts (accidental overdose, carbon monoxide poisoning). Notes DOC as cannabis, but also used methamphetamine and opioid.  Never had substance use with injection. Hx of substance use treatment program. Also was in [facility] and completed, but recently in 2021 spring, was not accepted at [facility] or [facility]. Psych critical item history includes 3 suicidal attempts, last 2019, trauma hx, multiple medication trials, multiple hospitalizations (estimates +25, first admitted in 2011 for overdose, last 2019 for [facility] and depression), substance use, substance treatment (2015  and 2017). Committed x 2 (2017 and 2019).    PCP: Becki Avery (restricted provider)  Therapist: Joana Hagen  : Senia Arreaga ([phone])     Past Medical History  Past Medical History:   Diagnosis Date     ADHD (attention deficit hyperactivity disorder)      Bipolar 1 disorder      Borderline personality disorder      Cauda equina syndrome      Chronic low back pain      Depression      Depressive disorder      GERD (gastroesophageal reflux disease)      h/o TBI (traumatic brain injury)      Marginal corneal ulcer, left 7/17/2015     Nephrolithiasis      Polysubstance abuse - methamphetamine, hallucinagen, heroin, marijuana     currently in remission     PONV (postoperative nausea and vomiting)      PTSD (post-traumatic stress disorder)      Past Surgical History:   Procedure Laterality Date     BACK SURGERY  12/24/2016     BACK SURGERY - For Cauda Equina Syndrome  12/24/2016     COLONOSCOPY       COMBINED CYSTOSCOPY, INSERT STENT URETER(S) Left 8/30/2018    Procedure: COMBINED CYSTOSCOPY, INSERT STENT URETER(S);  Cystoscopy With Left Stent Placement;  Surgeon: Kiran Ulrich MD;  Location: [facility]     COMBINED CYSTOSCOPY, RETROGRADES, EXCHANGE STENT URETER(S) Left 10/14/2018    Procedure: COMBINED CYSTOSCOPY, RETROGRADES, EXCHANGE STENT URETER(S);  Cystoscopy and removal of left-sided stent.;  Surgeon: Stiven Olivo MD;  Location:  OR     COMBINED CYSTOSCOPY, RETROGRADES, URETEROSCOPY, INSERT STENT  1/6/2014    Procedure: COMBINED CYSTOSCOPY, RETROGRADES, URETEROSCOPY, INSERT STENT;  Cystyoscopy place left ureteral stent;  Surgeon: Jaun Kimble MD;  Location: [facility] OR     COMBINED CYSTOSCOPY, RETROGRADES, URETEROSCOPY, INSERT STENT Left 10/23/2018    Procedure: Video cystoscopy, left ureteroscopy with stone extraction;  Surgeon: Bull Mast MD;  Location:  OR     CYSTOSCOPY, URETEROSCOPY, COMBINED Right 9/23/2015    Procedure: COMBINED CYSTOSCOPY,  URETEROSCOPY;  Surgeon: ROME Jett MD;  Location: [facility] OR     ENT SURGERY       ESOPHAGOSCOPY, GASTROSCOPY, DUODENOSCOPY (EGD), COMBINED  4/8/2013    Procedure: COMBINED ESOPHAGOSCOPY, GASTROSCOPY, DUODENOSCOPY (EGD), BIOPSY SINGLE OR MULTIPLE;  Gastroscopy;  Surgeon: Peter Pickard MD;  Location: [facility]     ESOPHAGOSCOPY, GASTROSCOPY, DUODENOSCOPY (EGD), COMBINED Left 10/28/2014    Procedure: COMBINED ESOPHAGOSCOPY, GASTROSCOPY, DUODENOSCOPY (EGD), BIOPSY SINGLE OR MULTIPLE;  Surgeon: Narcisa Ramirez MD;  Location:  OR     ESOPHAGOSCOPY, GASTROSCOPY, DUODENOSCOPY (EGD), COMBINED N/A 12/24/2018    Procedure: COMBINED ESOPHAGOSCOPY, GASTROSCOPY, DUODENOSCOPY (EGD), BIOPSY SINGLE OR MULTIPLE;  Surgeon: Sonu Verduzco MD;  Location: [facility]     INJECT EPIDURAL TRANSFORAMINAL LUMBAR / SACRAL EA ADDITIONAL LEVEL Left 3/18/2021    Procedure: Left L5/S1 transforaminal injection for selective L5 nerve root block;  Surgeon: Eliza Pagan MD;  [place] [facility] [place]     LAPAROSCOPIC CHOLECYSTECTOMY  11/20/2014    [facility], Dr. Ramirez     LASER HOLMIUM LITHOTRIPSY URETER(S), INSERT STENT, COMBINED  4/2/2014    Procedure: COMBINED CYSTOSCOPY, URETEROSCOPY, LASER HOLMIUM LITHOTRIPSY URETER(S), INSERT STENT;  Cystoscopy,Left Ureteral Stent Removal,Left Ureteroscopy with Laser Lithotripsy,Left Ureter Stent Placement;  Surgeon: ROME Jett MD;  Location: [facility] OR     Transurethral stone resection  03/11/2011     amphetamine-dextroamphetamine (ADDERALL) 10 MG tablet  clindamycin (CLINDAMAX) 1 % external gel  clonazePAM (KLONOPIN) 1 MG tablet  escitalopram (LEXAPRO) 10 MG tablet  fluPHENAZine decanoate (PROLIXIN) 25 MG/ML injection  gabapentin (NEURONTIN) 300 MG capsule  [START ON 11/27/2021] lisdexamfetamine (VYVANSE) 50 MG capsule  lithium ER (LITHOBID) 300 MG CR tablet  naloxone (NARCAN) 4 MG/0.1ML nasal spray  nystatin (MYCOSTATIN) 470559 UNIT/GM external cream  omeprazole (PRILOSEC) 20 MG DR  "capsule  ondansetron (ZOFRAN ODT) 4 MG ODT tab  QUEtiapine (SEROQUEL) 50 MG tablet  testosterone cypionate (DEPOTESTOSTERONE) 200 MG/ML injection  needle, disp, 18G X 1\" MISC  Needle, Disp, 25G X 5/8\" MISC  nicotine (NICORETTE) 2 MG gum  syringe, disposable, 1 ML MISC  Syringe/Needle, Disp, 21G X 1\" 3 ML MISC      Allergies   Allergen Reactions     Haldol [Haloperidol] Other (See Comments)     Makes patient very angry and anxious     Adhesive Tape Hives     Percocet [Oxycodone-Acetaminophen] Nausea and Vomiting     Prednisone Other (See Comments) and Hives     Suicidal ideation     Risperidone Other (See Comments)     Tramadol Hcl Nausea and Vomiting     Droperidol Anxiety     Seroquel [Quetiapine] Palpitations     Spent 2 weeks in the hospital due to having seroquel, caused palpitations and QT prolongation     Family History  Family History   Problem Relation Age of Onset     Gastrointestinal Disease Father         Crohn's Disease     Cancer Father         Liver Cancer     Other Cancer Father         Liver     Obesity Father      Cancer Maternal Grandmother         lympoma     Diabetes Maternal Grandmother      Mental Illness Maternal Grandmother      Other Cancer Maternal Grandmother         Non Hodgkins Lymphoma     Diabetes Maternal Grandfather      Hypertension Maternal Grandfather      Prostate Cancer Maternal Grandfather      Hyperlipidemia Maternal Grandfather      Heart Disease Paternal Grandfather         heart disease     Hypertension Brother      Other Cancer Brother         Esophagecial     Diabetes Brother      Obesity Brother      Hyperlipidemia Mother      Mental Illness Mother      Anxiety Disorder Mother      Anesthesia Reaction Mother         Vomiting/Nausea     Hypertension Brother      Other Cancer Brother      Other Cancer Paternal Half-Brother         Esophageal     No Known Problems Sister      Social History   Social History     Tobacco Use     Smoking status: Former Smoker     Packs/day: " 1.00     Years: 5.00     Pack years: 5.00     Types: Dip, chew, snus or snuff, Other     Start date: 8/1/2011     Smokeless tobacco: Former User     Types: Chew     Quit date: 12/17/2019     Tobacco comment: vape   Vaping Use     Vaping Use: Former   Substance Use Topics     Alcohol use: No     Alcohol/week: 0.0 standard drinks     Drug use: Not Currently     Types: Marijuana, Opiates     Comment: oxy, heroin (smoke)      Past medical history, past surgical history, medications, allergies, family history, and social history were reviewed with the patient. No additional pertinent items.       Review of Systems   Unable to perform ROS: Psychiatric disorder   Psychiatric/Behavioral: Positive for hallucinations and sleep disturbance. Negative for suicidal ideas. The patient is nervous/anxious.    All other systems reviewed and are negative.    A complete review of systems was performed with pertinent positives and negatives noted in the HPI, and all other systems negative.    Physical Exam   BP: (!) 141/97  Pulse: 116  Temp: 97.4  F (36.3  C)  Resp: 18  SpO2: 96 %  Physical Exam  Vitals and nursing note reviewed.   Constitutional:       Appearance: He is obese.      Comments: Withdrawn, decreased eye contact, appears anxious   HENT:      Head: Atraumatic.      Nose: No congestion or rhinorrhea.   Eyes:      Extraocular Movements: Extraocular movements intact.   Cardiovascular:      Rate and Rhythm: Tachycardia present.   Pulmonary:      Effort: Pulmonary effort is normal.   Musculoskeletal:         General: Normal range of motion.      Cervical back: Normal range of motion.   Skin:     General: Skin is warm and dry.   Neurological:      General: No focal deficit present.   Psychiatric:         Attention and Perception: He perceives auditory hallucinations.         Mood and Affect: Mood is anxious. Affect is blunt and flat.         Speech: Speech is delayed.         Behavior: Behavior is slowed and withdrawn.          Thought Content: Suicidal: voices are telling him to kill himself but patient denies si.         Cognition and Memory: Cognition and memory normal.         Judgment: Judgment is not impulsive.         ED Course      Procedures       The medical record was reviewed and interpreted.  Current labs reviewed and interpreted.       Results for orders placed or performed during the hospital encounter of 11/26/21   Asymptomatic COVID-19 Virus (Coronavirus) by PCR Nasopharyngeal     Status: Normal    Specimen: Nasopharyngeal; Swab   Result Value Ref Range    SARS CoV2 PCR Negative Negative    Narrative    Testing was performed using the janet  SARS-CoV-2 & Influenza A/B Assay on the janet  Gab[employer]ela  System.  This test should be ordered for the detection of SARS-COV-2 in individuals who meet SARS-CoV-2 clinical and/or epidemiological criteria. Test performance is unknown in asymptomatic patients.  This test is for in vitro diagnostic use under the FDA EUA for laboratories certified under CLIA to perform moderate and/or high complexity testing. This test has not been FDA cleared or approved.  A negative test does not rule out the presence of PCR inhibitors in the specimen or target RNA in concentration below the limit of detection for the assay. The possibility of a false negative should be considered if the patient's recent exposure or clinical presentation suggests COVID-19.  [facility] are certified under the Clinical Laboratory Improvement Amendments of 1988 (CLIA-88) as qualified to perform moderate and/or high complexity laboratory testing.   Basic metabolic panel     Status: Normal   Result Value Ref Range    Sodium 137 133 - 144 mmol/L    Potassium 3.6 3.4 - 5.3 mmol/L    Chloride 105 94 - 109 mmol/L    Carbon Dioxide (CO2) 26 20 - 32 mmol/L    Anion Gap 6 3 - 14 mmol/L    Urea Nitrogen 16 7 - 30 mg/dL    Creatinine 0.78 0.52 - 1.25 mg/dL    Calcium 10.1 8.5 - 10.1 mg/dL    Glucose 93 70 - 99 mg/dL     GFR Estimate >90 >60 mL/min/1.73m2    Narrative    The sex of this patient cannot be reliably determined based on discrepancies in demographics (legal sex, sex assigned at birth, gender identity).  Both male and female reference ranges are provided where applicable.  Careful evaluation of the patient s results as compared to the gender specific reference intervals is required in this setting.    Lithium level     Status: Normal   Result Value Ref Range    Lithium 0.9   mmol/L   TSH with free T4 reflex     Status: Abnormal   Result Value Ref Range    TSH 4.95 (H) 0.40 - 4.00 mU/L   CBC with platelets and differential     Status: Abnormal   Result Value Ref Range    WBC Count 12.6 (H) 4.0 - 11.0 10e3/uL    RBC Count 5.27 3.80 - 5.90 10e6/uL    Hemoglobin 14.0 11.7 - 17.7 g/dL    Hematocrit 42.6 35.0 - 53.0 %    MCV 81 78 - 100 fL    MCH 26.6 26.5 - 33.0 pg    MCHC 32.9 31.5 - 36.5 g/dL    RDW 14.9 10.0 - 15.0 %    Platelet Count 358 150 - 450 10e3/uL    % Neutrophils 67 %    % Lymphocytes 25 %    % Monocytes 5 %    % Eosinophils 2 %    % Basophils 1 %    % Immature Granulocytes 0 %    NRBCs per 100 WBC 0 <1 /100    Absolute Neutrophils 8.4 (H) 1.6 - 8.3 10e3/uL    Absolute Lymphocytes 3.2 0.8 - 5.3 10e3/uL    Absolute Monocytes 0.7 0.0 - 1.3 10e3/uL    Absolute Eosinophils 0.2 0.0 - 0.7 10e3/uL    Absolute Basophils 0.1 0.0 - 0.2 10e3/uL    Absolute Immature Granulocytes 0.0 <=0.0 10e3/uL    Absolute NRBCs 0.0 10e3/uL    Narrative    The sex of this patient cannot be reliably determined based on discrepancies in demographics (legal sex, sex assigned at birth, gender identity).  Both male and female reference ranges are provided where applicable.  Careful evaluation of the patient s results as compared to the gender specific reference intervals is required in this setting.    CBC with platelets differential     Status: Abnormal    Narrative    The following orders were created for panel order CBC with platelets  "differential.  Procedure                               Abnormality         Status                     ---------                               -----------         ------                     CBC with platelets and d...[[id number]]  Abnormal            Final result                 Please view results for these tests on the individual orders.     Medications   hydrOXYzine (ATARAX) tablet 50 mg (50 mg Oral Given 11/26/21 7579)        Assessments & Plan (with Medical Decision Making)   The patient has schizoaffective disorder, bipolar type and presents to the ED with a friend.  He says he came because he is really anxious.  He says he took his bedtime seroquel at 4 pm to help his anxiety but he still feels really anxious.  He says he has been having worsening hallucinations over the past couple of days and the voices are telling him to kill himself. He usually has klonopin for anxiety but ran out 2 days ago.  He apparently last filled his klonopin 11/12/21 and is it unclear if he is abusing the klonopin and ran out early.  The group home hung up on the DEC  so they did not give much information to us.  They did say they have found prescription meds for other people in the patient's room and that the patient uses a different pharmacy than the group [place] pharmacy.  There are also notes in epic showing discussion about commitment and if they should support extension or not.  Note also shows\"Per CM,  reports pt has been using substances, possibly doctor-shopping, getting meds from pharmacies other than what the  uses. Pt reports to  that they don't take meds if plans to use.  CM knows pt has passed out on at least two occasions.\"  He was given atarax 50 mg po here.  He does seem a bit sleepy but no airway concerns.  He was seen by the DEC  and myself.  We felt given the recent commitment discussion and possible drug abuse concerns, the possibility he could be withdrawal from benzos and also the fact the " he is hearing voices to tell him to kill himself, all make admission to inpatient mental health our recommendation.  The patient agrees with admission.   He is medically appropriate for inpatient mental health admission. He was accepted at [facility].     I have reviewed the nursing notes. I have reviewed the findings, diagnosis, plan and need for follow up with the patient.    New Prescriptions    No medications on file       Final diagnoses:   Schizoaffective disorder, bipolar type (H)   Transgender       --  Sylvia Dominguez  [facility] EMERGENCY DEPARTMENT  11/26/2021     Sylvia Dominguez MD  11/26/21 [id number]       Sylvia Dominguez MD  11/26/21 [id number]

## 2021-11-27 NOTE — PLAN OF CARE
"  Problem: Cognitive Impairment (Anxiety Signs/Symptoms)  Goal: Optimized Cognitive Function (Anxiety Signs/Symptoms)  Outcome: Improving     Problem: Behavioral Health Plan of Care  Goal: Adheres to Safety Considerations for Self and Others  Outcome: Improving   Pt was mainly in room for shift, while out in the lounge kept to self and didn't socialize with staffs or peers. Very poor historian. Answers yes or no to questions. Refused to attend group as encouraged. BP elevated 143/99 while pt was reporting anxiety 9/10. Klonopin given @ 1138. Staff was unable to re-assess as pt remain in bed sleeping for rest of shift. Pt refused breakfast and lunch. Pt was given PRN Zofran @ 12pm as requested. Pt endorsed command auditory hallucination telling him to kill himself, but pt stated \"I know better not to do it. I will contract for safety\"   Denied SI/HI, and depression. Denied pain. Med compliant and contracted for safety. Will continue to evaluate.  "

## 2021-11-27 NOTE — H&P
Psychiatric History and Physical          Chief Complaint:     Hallucinations      HPI:     Patient reports problems with his mood for several years. He states that he is diagnosed with anxiety, PTSD and ADHD. Chart indicates a diagnosis of bipolar disorder. Patient reports that he has had up to 6 prior admissions for haven/psychosis. His most recent admission was 2 years. He follows with Dr. Regine Last at [facility] outpatient dept. She also has case management through [facility].     Patient reports that he had been reasonably stable for the past 2 years until a few days ago. He started having more hallucinations.     Patient was referred to hospital due to worsening auditory hallucinations and poor stability in his current group home.    Patient presented to ER on advice from his . Reportedly case manage is revoking patients provisional discharge.    According to ER MD report by:  Sylvia PEREZ [facility] EMERGENCY DEPARTMENT  11/26/2021  Ayana Prasad is a 31 year old transgender male (preferred he/him/his pronouns) with history of schizoaffective disorder, bipolar type, ADHD, PTSD, polysubstance abuse presenting to the ED for anxiety.  He says he has been feeling anxious for the past 2 days.  He can't say why.  He says he has klonopin for anxiety but ran out 2 days ago and couldn't get the refill because the pharmacy was closed.  (apparently he has picked up klonopin script on 11/12/21).  He lives in a group home and was brought here by a roommate.  He denies si.  He says he has been hearing more voices for the past 2 days and that they say bad things.  He didn't want to talk about but admits that they are telling him to kill himself which he says is not his baseline.  He says his sleep hasn't been good for the past couple of days.  He says he took his bedtime seroquel at 4 pm today to try and help the anxiety but he still feels really anxious. Per his group home they  have found prescription drugs in his room that are not his.  He also uses [facility] instead of the group home pharmacy.    Per chart review patient with multiple psychiatric hospitalizations starting 2013, last admission 2019.  Last haven 2019. 3 suicide attempts (accidental overdose, carbon monoxide poisoning). Notes DOC as cannabis, but also used methamphetamine and opioid.  Never had substance use with injection. Hx of substance use treatment program. Also was in [facility] and completed, but recently in 2021 spring, was not accepted at [facility] or [facility] program. Psych critical item history includes 3 suicidal attempts, last 2019, trauma hx, multiple medication trials, multiple hospitalizations (estimates +25, first admitted in 2011 for overdose, last 2019 for haven and depression), substance use, substance treatment (2015 and 2017). Committed x 2 (2017 and 2019).  PCP: Becki Avery (restricted provider)  Therapist: Joana Hagen  : Senia Arreaga          Past Psychiatric History:     Current and historical diagnoses or mental health concerns: Schizoaffective disorder, bipolar type, ADHD, PTSD, anxiety. borderline personality disorder, and polysubstance abuse     Prior  services (inpatient, programmatic care, outpatient, etc): Yes multiple psychiatric hospitalizations, most recently 2020     History of substance abuse: Yes polysubstance abuse     Prior KIERRA services (inpatient, programmatic care, detox, outpatient, etc): Unable to assess.      History of commitment: Yes currently under commitment with [facility] through 2/4/2022             Substance Use and History:     Patient denies alcohol or drug use        Past Medical History:   PAST MEDICAL HISTORY:   Past Medical History:   Diagnosis Date     ADHD (attention deficit hyperactivity disorder)      Bipolar 1 disorder      Borderline personality disorder      Cauda equina syndrome      Chronic low back pain       Depression      GERD (gastroesophageal reflux disease)      h/o TBI (traumatic brain injury)      Marginal corneal ulcer, left 07/17/2015     Nephrolithiasis      obesity      Polysubstance abuse - methamphetamine, hallucinagen, heroin, marijuana     currently in remission     PONV (postoperative nausea and vomiting)      PTSD (post-traumatic stress disorder)        PAST SURGICAL HISTORY:   Past Surgical History:   Procedure Laterality Date     BACK SURGERY  12/24/2016     BACK SURGERY - For Cauda Equina Syndrome  12/24/2016     COLONOSCOPY       COMBINED CYSTOSCOPY, INSERT STENT URETER(S) Left 8/30/2018    Procedure: COMBINED CYSTOSCOPY, INSERT STENT URETER(S);  Cystoscopy With Left Stent Placement;  Surgeon: Kiran Ulrich MD;  Location: [facility] OR     COMBINED CYSTOSCOPY, RETROGRADES, EXCHANGE STENT URETER(S) Left 10/14/2018    Procedure: COMBINED CYSTOSCOPY, RETROGRADES, EXCHANGE STENT URETER(S);  Cystoscopy and removal of left-sided stent.;  Surgeon: Stiven Olivo MD;  Location:  OR     COMBINED CYSTOSCOPY, RETROGRADES, URETEROSCOPY, INSERT STENT  1/6/2014    Procedure: COMBINED CYSTOSCOPY, RETROGRADES, URETEROSCOPY, INSERT STENT;  Cystyoscopy place left ureteral stent;  Surgeon: Jaun Kimble MD;  Location: [facility] OR     COMBINED CYSTOSCOPY, RETROGRADES, URETEROSCOPY, INSERT STENT Left 10/23/2018    Procedure: Video cystoscopy, left ureteroscopy with stone extraction;  Surgeon: Bull Mast MD;  Location:  OR     CYSTOSCOPY, URETEROSCOPY, COMBINED Right 9/23/2015    Procedure: COMBINED CYSTOSCOPY, URETEROSCOPY;  Surgeon: ROME Jett MD;  Location: [facility] OR     ENT SURGERY       ESOPHAGOSCOPY, GASTROSCOPY, DUODENOSCOPY (EGD), COMBINED  4/8/2013    Procedure: COMBINED ESOPHAGOSCOPY, GASTROSCOPY, DUODENOSCOPY (EGD), BIOPSY SINGLE OR MULTIPLE;  Gastroscopy;  Surgeon: Peter Pickard MD;  Location: [facility] GI     ESOPHAGOSCOPY, GASTROSCOPY, DUODENOSCOPY (EGD), COMBINED Left  10/28/2014    Procedure: COMBINED ESOPHAGOSCOPY, GASTROSCOPY, DUODENOSCOPY (EGD), BIOPSY SINGLE OR MULTIPLE;  Surgeon: Narcisa Ramirez MD;  Location:  OR     ESOPHAGOSCOPY, GASTROSCOPY, DUODENOSCOPY (EGD), COMBINED N/A 12/24/2018    Procedure: COMBINED ESOPHAGOSCOPY, GASTROSCOPY, DUODENOSCOPY (EGD), BIOPSY SINGLE OR MULTIPLE;  Surgeon: Sonu Verduzco MD;  Location: [facility] GI     INJECT EPIDURAL TRANSFORAMINAL LUMBAR / SACRAL EA ADDITIONAL LEVEL Left 3/18/2021    Procedure: Left L5/S1 transforaminal injection for selective L5 nerve root block;  Surgeon: Eliza Pagan MD;  [place] [facility] [place]     [place]APAROSCOPIC CHOLECYSTECTOMY  11/20/2014    [facility], Dr. Ramirez     LASER HOLMIUM LITHOTRIPSY URETER(S), INSERT STENT, COMBINED  4/2/2014    Procedure: COMBINED CYSTOSCOPY, URETEROSCOPY, LASER HOLMIUM LITHOTRIPSY URETER(S), INSERT STENT;  Cystoscopy,Left Ureteral Stent Removal,Left Ureteroscopy with Laser Lithotripsy,Left Ureter Stent Placement;  Surgeon: ROME Jett MD;  Location: [facility] OR     Transurethral stone resection  03/11/2011             Family History:   FAMILY HISTORY:   Family History   Problem Relation Age of Onset     Gastrointestinal Disease Father         Crohn's Disease     Cancer Father         Liver Cancer     Other Cancer Father         Liver     Obesity Father      Cancer Maternal Grandmother         lympoma     Diabetes Maternal Grandmother      Mental Illness Maternal Grandmother      Other Cancer Maternal Grandmother         Non Hodgkins Lymphoma     Diabetes Maternal Grandfather      Hypertension Maternal Grandfather      Prostate Cancer Maternal Grandfather      Hyperlipidemia Maternal Grandfather      Heart Disease Paternal Grandfather         heart disease     Hypertension Brother      Other Cancer Brother         Esophagecial     Diabetes Brother      Obesity Brother      Hyperlipidemia Mother      Mental Illness Mother      Anxiety Disorder Mother      Anesthesia Reaction Mother          Vomiting/Nausea     Hypertension Brother      Other Cancer Brother      Other Cancer Paternal Half-Brother         Esophageal     No Known Problems Sister      Patient denies family history of mental illness or chemical dependency        Social History:   Please see the full psychosocial profile from the clinical treatment coordinator.   SOCIAL HISTORY:   Social History     Tobacco Use     Smoking status: Former Smoker     Packs/day: 1.00     Years: 5.00     Pack years: 5.00     Types: Dip, chew, snus or snuff, Other     Start date: 8/1/2011     Smokeless tobacco: Former User     Types: Chew     Quit date: 12/17/2019     Tobacco comment: vape   Substance Use Topics     Alcohol use: No     Alcohol/week: 0.0 standard drinks     Patient completed HS and college and has a degree in biology. He has been unemployed for past 2 years. He is currently on disability. He lives in a group home where he has been for past 10  Months. He has no current romantic involvement and no children. He has occasional contact with parents.    Patient states that he started transitioning in March of this year which has been stressful.         PTA Medications:     Medications Prior to Admission   Medication Sig Dispense Refill Last Dose     amphetamine-dextroamphetamine (ADDERALL) 10 MG tablet TAKE 1 TABLET BY MOUTH EVERY AFTERNOON 30 tablet 0      clindamycin (CLINDAMAX) 1 % external gel Apply topically 2 times daily 60 g 4      clonazePAM (KLONOPIN) 1 MG tablet Take 1 tablet (1 mg) by mouth 2 times daily as needed for anxiety 14 tablet 1      escitalopram (LEXAPRO) 10 MG tablet Take 1 tablet (10 mg) by mouth daily 90 tablet 0      fluPHENAZine decanoate (PROLIXIN) 25 MG/ML injection INJECT 1ML (25MG) INTRAMUSCULARLY EVERY 14 DAYS 6 mL 4      gabapentin (NEURONTIN) 300 MG capsule TAKE 3 CAPSULES (900MG) AND TAKE 4 CAPSULES (1200MG ) BY MOUTH MIDDAY DAY AND TAKE 3 CAPSULES (900MG) BY MOUTH EVERY EVENING 300 capsule 0      lisdexamfetamine  "(VYVANSE) 50 MG capsule TAKE 1 CAPSULE BY MOUTH EVERY MORNING 30 capsule 0      lithium ER (LITHOBID) 300 MG CR tablet Take 1 tablet (300 mg) by mouth every morning AND 3 tablets (900 mg) At Bedtime. 120 tablet 0      naloxone (NARCAN) 4 MG/0.1ML nasal spray Spray 1 spray (4 mg) into one nostril alternating nostrils once as needed for opioid reversal every 2-3 minutes until assistance arrives 0.2 mL 11      needle, disp, 18G X 1\" MISC Use for drawing up weekly testosterone dose 50 each 3      Needle, Disp, 25G X 5/8\" MISC Use for injecting weekly testosterone dose 50 each 3      nicotine (NICORETTE) 2 MG gum Place 1 each (2 mg) inside cheek as needed for smoking cessation 50 each 3      nystatin (MYCOSTATIN) 767607 UNIT/GM external cream Apply topically 2 times daily Apply to affected area and armpits twice per day until redness resolves 60 g 2      omeprazole (PRILOSEC) 20 MG DR capsule TAKE 1 CAPSULE BY MOUTH DAILY 30 capsule 1      ondansetron (ZOFRAN ODT) 4 MG ODT tab Take 1 tablet (4 mg) by mouth every 8 hours as needed 10 tablet 0      QUEtiapine (SEROQUEL) 50 MG tablet Take 3 tablets (150 mg) by mouth At Bedtime 90 tablet 1      syringe, disposable, 1 ML MISC Use for weekly testosterone dose 60 each 3      Syringe/Needle, Disp, 21G X 1\" 3 ML MISC 1 each every 14 days 6 each 3      testosterone cypionate (DEPOTESTOSTERONE) 200 MG/ML injection INJECT 0.2 ML SUBCUTANEOUSLY ONCE WEEKLY 2 mL 0             Current Medications:       clindamycin   Topical BID     escitalopram  10 mg Oral Daily     gabapentin  300 mg Oral Daily with lunch     gabapentin  900 mg Oral TID     lithium ER  300 mg Oral Q12H RADHA     lithium ER  600 mg Oral At Bedtime     nystatin   Topical BID     omeprazole  20 mg Oral Daily     QUEtiapine  150 mg Oral At Bedtime     acetaminophen, alum & mag hydroxide-simethicone, clonazePAM, hydrOXYzine, nicotine, OLANZapine **OR** OLANZapine, ondansetron, polyethylene glycol, traZODone         " Allergies:     Allergies   Allergen Reactions     Haldol [Haloperidol] Other (See Comments)     Makes patient very angry and anxious     Adhesive Tape Hives     Percocet [Oxycodone-Acetaminophen] Nausea and Vomiting     Prednisone Other (See Comments) and Hives     Suicidal ideation     Risperidone Other (See Comments)     Tramadol Hcl Nausea and Vomiting     Droperidol Anxiety     Seroquel [Quetiapine] Palpitations     Spent 2 weeks in the hospital due to having seroquel, caused palpitations and QT prolongation          Labs:     Recent Results (from the past 48 hour(s))   Asymptomatic COVID-19 Virus (Coronavirus) by PCR Nasopharyngeal    Collection Time: 11/26/21 11:03 PM    Specimen: Nasopharyngeal; Swab   Result Value Ref Range    SARS CoV2 PCR Negative Negative   Basic metabolic panel    Collection Time: 11/26/21 11:03 PM   Result Value Ref Range    Sodium 137 133 - 144 mmol/L    Potassium 3.6 3.4 - 5.3 mmol/L    Chloride 105 94 - 109 mmol/L    Carbon Dioxide (CO2) 26 20 - 32 mmol/L    Anion Gap 6 3 - 14 mmol/L    Urea Nitrogen 16 7 - 30 mg/dL    Creatinine 0.78 0.52 - 1.25 mg/dL    Calcium 10.1 8.5 - 10.1 mg/dL    Glucose 93 70 - 99 mg/dL    GFR Estimate >90 >60 mL/min/1.73m2   Lithium level    Collection Time: 11/26/21 11:03 PM   Result Value Ref Range    Lithium 0.9   mmol/L   TSH with free T4 reflex    Collection Time: 11/26/21 11:03 PM   Result Value Ref Range    TSH 4.95 (H) 0.40 - 4.00 mU/L   CBC with platelets and differential    Collection Time: 11/26/21 11:03 PM   Result Value Ref Range    WBC Count 12.6 (H) 4.0 - 11.0 10e3/uL    RBC Count 5.27 3.80 - 5.90 10e6/uL    Hemoglobin 14.0 11.7 - 17.7 g/dL    Hematocrit 42.6 35.0 - 53.0 %    MCV 81 78 - 100 fL    MCH 26.6 26.5 - 33.0 pg    MCHC 32.9 31.5 - 36.5 g/dL    RDW 14.9 10.0 - 15.0 %    Platelet Count 358 150 - 450 10e3/uL    % Neutrophils 67 %    % Lymphocytes 25 %    % Monocytes 5 %    % Eosinophils 2 %    % Basophils 1 %    % Immature Granulocytes  "0 %    NRBCs per 100 WBC 0 <1 /100    Absolute Neutrophils 8.4 (H) 1.6 - 8.3 10e3/uL    Absolute Lymphocytes 3.2 0.8 - 5.3 10e3/uL    Absolute Monocytes 0.7 0.0 - 1.3 10e3/uL    Absolute Eosinophils 0.2 0.0 - 0.7 10e3/uL    Absolute Basophils 0.1 0.0 - 0.2 10e3/uL    Absolute Immature Granulocytes 0.0 <=0.0 10e3/uL    Absolute NRBCs 0.0 10e3/uL   T4 free    Collection Time: 11/26/21 11:03 PM   Result Value Ref Range    Free T4 0.87 ng/dL          Physical Exam and Psychiatric Examination:     /87 (BP Location: Left arm, Patient Position: Standing)   Pulse 98   Temp 98.6  F (37  C) (Oral)   Resp 18   Ht 1.651 m (5' 5\")   Wt 107.4 kg (236 lb 11.2 oz)   SpO2 95%   BMI 39.39 kg/m    Weight is 236 lbs 11.2 oz  Body mass index is 39.39 kg/m .    Physical Exam:  Gen: No acute distress  HEENT: EOMI, no nystagmus or scleral icterus, moist mucous membranes  Skin: No diaphoresis or rash  Resp: Clear to auscultation bilaterally   CV: Regular rate and rhythm, no murmur   Abd: No pain  Ext: No cyanosis, clubbing or edema  Neuro: No abnormal movements, no focal deficits         Physical ROS:     Review of Systems is otherwise negative including HEENT, CV, Respiratory, GI, , Musculoskeletal, Neurologic, Dermatologic, Endocrine, Immunological, Constitutional systems           Mental Status Exam:     Mental Status  Patient is casually dressed  Hygiene good  Speech fluent  Thought Process concrete  Thought Content:  denies suicidal ideation but has command AH to kill self,    No homicidal ideation,   No ideas of reference,    No loose associations,    + auditory hallucinations including commands to kill self,     No visual hallucinations   No delusions  Psychomotor: restless  Cognition:  Alert and oriented to time place and person  Attention good  Concentration fair  Memory normal including recent and remote memory  Mood:  Denies depression  Affect: mood congruent  Judgement: limited  Eye contact good  Cooperation " good  Language normal  Fund of knowledge normal  Musculoskeletal normal gait with no abnormal movements         Admission Diagnoses:   Bipolar affective disorder, mixed, severe, with psychotic behavior (H)    Patient Active Problem List    Diagnosis Date Noted     Psychosis (H) 11/27/2021     Priority: Medium     Prediabetes 10/27/2021     Priority: Medium     Viral gastroenteritis 09/13/2021     Priority: Medium     Acanthosis nigricans 07/21/2021     Priority: Medium     Elevated blood pressure reading without diagnosis of hypertension 05/05/2021     Priority: Medium     Seizure-like activity (H) 03/23/2021     Priority: Medium     DUB (dysfunctional uterine bleeding) 03/01/2021     Priority: Medium     3/1/2021  Plan Documentation  Service ordered Depo Provera injection (150mg IM) may be given every 3 months for one year per protoccol.  Education by RN to be done in clinic.  topic injection teaching.   Plan and order should be renewed at a visit no later than March 1, 2022    Glenda Juan MD        Lumbosacral radiculopathy at L5 02/23/2021     Priority: Medium     Added automatically from request for surgery [id number]       Gender identity disorder 02/16/2021     Priority: Medium     Aggressive behavior 12/13/2020     Priority: Medium     AVA (generalized anxiety disorder) 11/16/2020     Priority: Medium     Chronic bilateral low back pain without sciatica 01/17/2020     Priority: Medium     Obesity (BMI 35.0-39.9) with comorbidity (H) 12/18/2019     Priority: Medium     Urinary retention 06/29/2019     Priority: Medium     Spell of altered consciousness 06/27/2019     Priority: Medium     Bella (H) 06/04/2019     Priority: Medium     Suicidal ideation 04/09/2019     Priority: Medium     Nausea 02/25/2019     Priority: Medium     Cyst of left ovary 11/05/2018     Priority: Medium     Nephrolithiasis 08/29/2018     Priority: Medium     Class 2 obesity due to excess calories in adult 03/07/2018      Priority: Medium     Auditory hallucination 02/26/2018     Priority: Medium     Anxiety 01/05/2018     Priority: Medium     Schizoaffective disorder, bipolar type (H) 06/30/2017     Priority: Medium     PTSD (post-traumatic stress disorder) 06/30/2017     Priority: Medium     Cauda equina syndrome with neurogenic bladder (H) 01/20/2017     Priority: Medium     Moderate episode of recurrent major depressive disorder (H) 01/17/2017     Priority: Medium     Borderline personality disorder (H) 12/27/2016     Priority: Medium     History of heroin abuse (H) 12/27/2016     Priority: Medium     Optic neuritis 03/04/2016     Priority: Medium     From recent dx of optic neuritis w/ vision loss, and iritis. Received infusions x 4 of solumedrol at [facility]. MRI done of head as well which was normal. Spinal tap looked ok per patient.         Intractable back pain 09/20/2015     Priority: Medium     Depression 02/14/2015     Priority: Medium     Overdose 02/14/2015     Priority: Medium     Overview:   Intentional overdose October 2016 and May 2016       Cannabis dependence (H) 08/22/2013     Priority: Medium     Episodic mood disorder (H) 08/22/2013     Priority: Medium     Opioid use disorder, severe, dependence (H) 08/22/2013     Priority: Medium     Substance-induced psychotic disorder with hallucinations (H) 08/22/2013     Priority: Medium     GERD (gastroesophageal reflux disease) 07/08/2013     Priority: Medium     Tobacco abuse 07/08/2013     Priority: Medium     Marijuana abuse 05/31/2013     Priority: Medium     Daily.  Some use of MDMA and cocaine in past and recently had a 4 day break where she doesn't recall getting lost in woods.        Polysubstance abuse (H) 05/31/2013     Priority: Medium     Marijuana daily, Xanax periodically.  MDMA a couple times and cocaine. Narcotics and over the counter. Dextromethorphan with overdose 5/1/16 at [facility].  CD recommended.        Bipolar 1 disorder, manic, mild  01/13/2012     Priority: Medium     Close to meeting criteria for bipolar 1? Reji Dey.  Psychology feels bipolar may be appropriate diagnosis although subsequent evaluation by psychiatry at [facility] felt depression with borderline personality.  Will return for med discussion with preference for Lithium 300 two times daily with goal 12 hour trough 0.8-1.2.  March 26, 2012 - intitiated Li and seemed to help some but stopped due to shakiness.  Lamotrigine trial as having more depression issues 25/d, up to two times daily then gradually increase to 100-200 mg daily.  Consider olazapine for thought disturbance. Has not wanted medications but used friends Xanax.  Prozac plus olazapine good next option once GI issues worked through. Patient very resistent to medications.  Continue with Reji.   May 31, 2013 - patient likely in a hypomanic/manic phase right now but no signs or symptoms of dangerous behaviors or thought processes.  Trial of olanzapine and fluoxetine. Didn't like olanzapine. Stopped as inpt for non-suicidal overdose at [facility]. Pyschiatry, psychology and continue with Prozac.  Now agreeing to take prozac and seroquel. Stopped Prozac on her own because didn't notice difference.  Trazodone didn't help. Stopped all. Possible haven?  Restart quetiapine for minor hallucinations.        ADHD (attention deficit hyperactivity disorder) 09/09/2011     Priority: Medium     Adderall ineffective.  Better on Concerta.  Still with anger issues predating medications. Declines counseling. Suggested vocational assessment.  Trial of guanfacine adjunct for anger but didn't help. Would avoid controlled substances given CD issues and impulsivity.       Abdominal pain, right upper quadrant 11/16/2010     Priority: Medium              Assessment:     Patient presents upon suggestion of  who is reportedly revoking provisional discharge. Patient is currently bothered by hallucinations       Plan:     Legal:  voluntary but is on commitment      Medication:    clindamycin   Topical BID     escitalopram  10 mg Oral Daily     [START ON 12/1/2021] fluPHENAZine decanoate  25 mg Intramuscular Q14 Days     gabapentin  300 mg Oral Daily with lunch     gabapentin  900 mg Oral TID     lithium ER  300 mg Oral Q12H RADHA     lithium ER  600 mg Oral At Bedtime     nystatin   Topical BID     omeprazole  20 mg Oral Daily     QUEtiapine  150 mg Oral At Bedtime     [START ON 12/1/2021] testosterone cypionate  0.2 mL Intramuscular Q14 Days     Will also use Seroquel  bid prn      Consults: Hospitalist will be consulted if medical issues arise      Multidisciplinary Interventions:  to gather collateral information, coordinate care with outpatient providers and begin follow up planning      Disposition: Group Home          More than 60 minutes spent on this visit with more than 50% time spent on coordination of care with staff, reviewing medical record, psychoeducation, providing supportive therapy regarding coping with chronic mental illness, entering orders and preparing documentation for the visit    Valentino Sarah MD    Initial Certification I certify that the inpatient psychiatric facility admission was medically necessary for treatment which could reasonably be expected to improve the patient s condition.        I estimate 10 days of hospitalization is necessary for proper treatment of the patient. My plans for post-hospital care this patient are Group Home     Valentino Sarah MD     -     11/27/2021     -

## 2021-11-28 PROCEDURE — 128N000001 HC R&B CD/MH ADULT

## 2021-11-28 PROCEDURE — 250N000013 HC RX MED GY IP 250 OP 250 PS 637: Performed by: PSYCHIATRY & NEUROLOGY

## 2021-11-28 RX ORDER — NICOTINE 21 MG/24HR
1 PATCH, TRANSDERMAL 24 HOURS TRANSDERMAL DAILY
Status: DISCONTINUED | OUTPATIENT
Start: 2021-11-28 | End: 2021-12-10 | Stop reason: HOSPADM

## 2021-11-28 RX ADMIN — NICOTINE POLACRILEX 2 MG: 2 GUM, CHEWING BUCCAL at 08:56

## 2021-11-28 RX ADMIN — GABAPENTIN 900 MG: 300 CAPSULE ORAL at 08:55

## 2021-11-28 RX ADMIN — GABAPENTIN 900 MG: 300 CAPSULE ORAL at 22:18

## 2021-11-28 RX ADMIN — NICOTINE POLACRILEX 2 MG: 2 GUM, CHEWING BUCCAL at 14:48

## 2021-11-28 RX ADMIN — NICOTINE POLACRILEX 2 MG: 2 GUM, CHEWING BUCCAL at 17:09

## 2021-11-28 RX ADMIN — Medication 1 PATCH: at 12:19

## 2021-11-28 RX ADMIN — ESCITALOPRAM OXALATE 10 MG: 10 TABLET ORAL at 08:56

## 2021-11-28 RX ADMIN — GABAPENTIN 1200 MG: 400 CAPSULE ORAL at 14:45

## 2021-11-28 RX ADMIN — QUETIAPINE FUMARATE 100 MG: 50 TABLET ORAL at 12:39

## 2021-11-28 RX ADMIN — QUETIAPINE FUMARATE 150 MG: 50 TABLET ORAL at 22:20

## 2021-11-28 RX ADMIN — LITHIUM CARBONATE 300 MG: 300 TABLET, FILM COATED, EXTENDED RELEASE ORAL at 09:01

## 2021-11-28 RX ADMIN — NICOTINE POLACRILEX 2 MG: 2 GUM, CHEWING BUCCAL at 12:40

## 2021-11-28 RX ADMIN — CLONAZEPAM 1 MG: 1 TABLET ORAL at 18:01

## 2021-11-28 RX ADMIN — LITHIUM CARBONATE 900 MG: 300 TABLET, FILM COATED, EXTENDED RELEASE ORAL at 22:19

## 2021-11-28 RX ADMIN — CLONAZEPAM 1 MG: 1 TABLET ORAL at 09:56

## 2021-11-28 RX ADMIN — PANTOPRAZOLE SODIUM 40 MG: 40 TABLET, DELAYED RELEASE ORAL at 08:56

## 2021-11-28 RX ADMIN — CLINDAMYCIN PHOSPHATE: 10 GEL TOPICAL at 08:56

## 2021-11-28 NOTE — PROGRESS NOTES
11/28/21 1145   Engagement   Intervention Group   Topic Detail OT [employer] group-fuzzy poster coloring for social engagement, healthy distraction, symptom management, and creativity   Attendance Attended   Patient Response Demonstrated understanding of materials provided;Was respectful   Concentrated on Task duration of group   Cognition Goal-directed;Attends to detail;Preoccupied   Mood/Affect Flat   Social/Behavioral Cooperative   Goals addressed in session today pt actively engaged in fuzzy poster coloring during group time. pt wore headphones for most of group. pt shared he would like to focus on decreasing his anxiety as his goal for the week

## 2021-11-28 NOTE — PROGRESS NOTES
"   11/28/21 1500   Occupational Therapy Psychosocial Assessment   Assessment Type Interview;Interview Form;Chart Review   Prior Level of Function   People in home facility resident;other (see comments)  (Group home staff)   Current Living Arrangements group home   ADLs   Activity/Exercise/Self-Care Comment Patient independently manages bADLs   Instrumental Activities of Daily Living (IADL)   Previous Responsibilities   (Group home staff provide support for IADLs)   Functional Mobility   Functional Mobility WFL   Equipment Currently Used at Home none   Therapy Assessment   Patient Presentation Pt is a 30 yo who identifies as male, going by \"Prakash\", with he/him pronouns. He was admitted voluntarily for command auditory hallucinations telling him to kill himself. History significant for schizoaffective disorder -bipolar type, ADHD, PTSD, anxiety, polysubstance abuse, and 25+ hospitalizations from 2011 to 2019, most recently for [facility]. Patient presents today as approachable and agreeable. He demonstrates insight to exacerbation of mental health symptoms and is seeking symptom stabilization. He reports the voices make him anxious and he expresses a desire to manage anxiety. Patient provided with sensory-based coping strategies list. He identified journaling, coloring, lavendar essential oil, and listening to music as preferred strategies for sx management. OT set patient up with a journal and lavendar essential oil.    Patient-identified areas of success Time spent with family or friends;Healthy leisure activities   Healthy Leisure Activities \"art - drawing, painting, sculpting\"   Patient-identified areas of difficulty Lack of satisfying daily routine;Motivation/mood   Patient-identified sources of support Safe place to live;Family, friends or caregivers to help when needed;Pets;Leisure supplies to support own interests and hobbies;Professional support   Professional support details Group home staff, , " "psychiatrist, psychotherapist   Patient-identified emotional, physical or mental health concerns \"anxiety and hearing voices\"   Patient-identified coping stategies for these concerns \"medication and art for the anxiety, aslo playing switch video games\"   Patient-identified stressors or changes in the past year \"transitioning from female to male and moving into the group home\"  (Pt reports he is dealing with it well)   Patient-identified values \"honesty\"   Patient's goal for the future \"to own my own home\"   Patient-identified community resources ;Personal Care Assistant   Patient's goals to work on with OT Feel better;Relax and enjoy oneself;Share own thoughts and feelings   Clinical Impression   Patient Personal Strengths community support;expressive of needs;interests/hobbies;motivated for treatment;positive educational history;stable living environment   Pt appears to have the following barriers/limitations Lack of daily routine;Poorly managed mental health symptoms   Patient's barriers/limitations contribute to Exacerbation of mental health symptoms   Planned Interventions Encourage group attendance;Identify and develop coping strategies;Continue to build rapport;Encourage engagement in meaningful activities   Risk & Benefits of therapy have been explained care plan/treatment goals reviewed;participants included;patient   Minutes Spent with Patient 10   Beh OT Plan for Next Session Patient will demonstrate or verbalize use of 3 healthy coping strategies for anxiety management this reveiw period.      Occupational Therapy   AIDET      Patient was introduced to the role of occupational therapy and oriented to the process of occupational therapy services on the unit, including function of groups.  Patient is approachable and pleasant. Patient goes by he/him pronouns and the name \"Prakash\". OT will follow up with assessment and address any questions, needs, or concerns.          "

## 2021-11-28 NOTE — PLAN OF CARE
Problem: Suicidal Behavior  Goal: Suicidal Behavior is Absent or Managed  Outcome: Improving     Problem: Behavioral Health Plan of Care  Goal: Plan of Care Review  Outcome: Improving     Pt was sleeping beginning of the shift and later was visible in the milieu. He did not socialized or engaged with peers or staff. Kept to himself. His affect was flat, blunted and looking tense and angry. His vital was stable with b/p 138/90 sitting and 137/92 standing. At that time patient stated his anxiety was 7/10. Prn Atarax 25 mg and Seroquel 100 mg was given which had some effect. He denied pain all shift. Denied depression, denied SI/HI. Endorses auditory hallucination and stated the voices are telling him to Kill himself but he is not going to act on what the voices are saying. He contracted for safety. He was out for all meals. Ate 25 % for supper and 100% for HS snacks. He was cooperative and med compliant. Prn Klonopin 1 mg was given at bed time for anxiety 9/10. Pt requested for headphone. Sticky note left for the treatment team. No other concerns or behavior noted at this time. Will continue to monitor and will assist if need arise.

## 2021-11-28 NOTE — PLAN OF CARE
"Less isolative in room this shift. Pt was out in [facility], attended OT group, solved puzzles and some coloring. Mood brighter and interacted with staffs and peers, but still appeared anxious.   Pt refused Atarax offered for increased anxiety rated 9/10 after given Klonopin @ 0956. Pt insisted on getting seraquel as his Anxiety PRN. Pt was reminded of Seroquel allergy with palpitations. Pt said \"I want it, I still take it. I had palpitation a while ego\" Pt was reminded what is written on allergy section that stated, \"he spent two weeks in hospital for palpitation and prolonged QT after taken Seroquel\"  Pt denied spending 2weeks in the hospital for Seroquel allergy.  Pt had Seroquel yesterday without c/o. PRN Seraquel given @ 1239 with minimal result as pt continues to rate high anxiety.    Pt continued to endorse command auditory hallucinations telling him to kill himself, but pt stated able to contract for safety. Pt denied depression and pain. Was med compliant. Pt demanding to have his ART work brought in from group home since no visiting @ this time. Sticky note left for pts care teams for evaluation. Will continue to evaluate.     Problem: Suicidal Behavior  Goal: Suicidal Behavior is Absent or Managed  Outcome: Improving     Problem: Mood Impairment (Anxiety Signs/Symptoms)  Goal: Improved Mood Symptoms (Anxiety Signs/Symptoms)  Outcome: No change.      Problem: Social, Occupational or Functional Impairment (Anxiety Signs/Symptoms)  Goal: Enhanced Social, Occupational or Functional Skills (Anxiety Signs/Symptoms)  Outcome: Improving     Problem: Behavioral Health Plan of Care  Goal: Optimized Coping Skills in Response to Life Stressors  Outcome: Improving     "

## 2021-11-28 NOTE — PLAN OF CARE
Problem: Suicidal Behavior  Goal: Suicidal Behavior is Absent or Managed  Outcome: Improving     Problem: Sleep Disturbance (Anxiety Signs/Symptoms)  Goal: Improved Sleep (Anxiety Signs/Symptoms)  Outcome: Improving     Problem: Behavioral Health Plan of Care  Goal: Absence of New-Onset Illness or Injury  Outcome: Improving     Patient slept well, sometimes with audible sleep apnea. Patient remained in bed throughout without making request for prn medications neither complaints of anxiety or pain this shift. Patient was essentially cooperative with safety checks with no behavioral or mood dysregulations. Patient endorses no SI/HI, auditory or visual hallucinations or SIB. No evidence of withdrawal or respiratory distress. Overall patient achieves greater than 7 hours of uninterrupted sleep.    Earle Mancini DNP, RN, APRN, CNS, AGCNS-BC

## 2021-11-29 PROCEDURE — 99233 SBSQ HOSP IP/OBS HIGH 50: CPT | Performed by: PSYCHIATRY & NEUROLOGY

## 2021-11-29 PROCEDURE — 250N000013 HC RX MED GY IP 250 OP 250 PS 637: Performed by: PSYCHIATRY & NEUROLOGY

## 2021-11-29 PROCEDURE — 128N000001 HC R&B CD/MH ADULT

## 2021-11-29 RX ORDER — QUETIAPINE FUMARATE 200 MG/1
200 TABLET, FILM COATED ORAL AT BEDTIME
Status: DISCONTINUED | OUTPATIENT
Start: 2021-11-29 | End: 2021-12-01

## 2021-11-29 RX ADMIN — OLANZAPINE 10 MG: 10 TABLET, FILM COATED ORAL at 16:53

## 2021-11-29 RX ADMIN — CLONAZEPAM 1 MG: 1 TABLET ORAL at 09:43

## 2021-11-29 RX ADMIN — ESCITALOPRAM OXALATE 10 MG: 10 TABLET ORAL at 08:33

## 2021-11-29 RX ADMIN — LITHIUM CARBONATE 300 MG: 300 TABLET, FILM COATED, EXTENDED RELEASE ORAL at 08:32

## 2021-11-29 RX ADMIN — NICOTINE POLACRILEX 2 MG: 2 GUM, CHEWING BUCCAL at 16:26

## 2021-11-29 RX ADMIN — NICOTINE POLACRILEX 2 MG: 2 GUM, CHEWING BUCCAL at 08:59

## 2021-11-29 RX ADMIN — GABAPENTIN 1200 MG: 400 CAPSULE ORAL at 13:37

## 2021-11-29 RX ADMIN — GABAPENTIN 900 MG: 300 CAPSULE ORAL at 08:32

## 2021-11-29 RX ADMIN — Medication 1 PATCH: at 08:37

## 2021-11-29 RX ADMIN — NICOTINE POLACRILEX 2 MG: 2 GUM, CHEWING BUCCAL at 13:06

## 2021-11-29 RX ADMIN — CLINDAMYCIN PHOSPHATE: 10 GEL TOPICAL at 08:33

## 2021-11-29 RX ADMIN — PANTOPRAZOLE SODIUM 40 MG: 40 TABLET, DELAYED RELEASE ORAL at 08:32

## 2021-11-29 RX ADMIN — QUETIAPINE FUMARATE 100 MG: 50 TABLET ORAL at 13:38

## 2021-11-29 ASSESSMENT — ACTIVITIES OF DAILY LIVING (ADL)
HYGIENE/GROOMING: INDEPENDENT
DRESS: INDEPENDENT
HYGIENE/GROOMING: INDEPENDENT
ORAL_HYGIENE: INDEPENDENT
LAUNDRY: WITH SUPERVISION

## 2021-11-29 NOTE — PLAN OF CARE
BEHAVIORAL TEAM DISCUSSION    Participants: RN: Sirisha MUJICA CTC: Dom BARNEY Provider: Gillian MCDONNELL MD, OT: Shona MUJICA Psychology: Michelle MUJICA PsyD. Pharmacy: [facility]   Progress: Isolative, auditory hallucinations  Anticipated length of stay: 1-2 weeks  Continued Stay Criteria/Rationale: Commitment ([facility]), symptom stabilization, medication management, coordination of care  Medical/Physical: None reported  Precautions: None reported  Behavioral Orders   Procedures     Code 1 - Restrict to Unit     Routine Programming     As clinically indicated     Status 15     Every 15 minutes.     Plan: Return to group home  Rationale for change in precautions or plan: N/A

## 2021-11-29 NOTE — PROGRESS NOTES
11/29/21 1110   Engagement   Intervention Group   Topic Detail OT Creative Expressions group-watercolor painting for social engagement, symptom management, healthy distraction, creativity, focus, and healthy leisure exploration   Attendance Attended   Patient Response Demonstrated understanding of materials provided;Expressed feelings/issues;Prosocial behavior;Contributes to conversation;Accepted feedback   Concentrated on Task duration of group   Cognition Goal-directed;Sequences task   Mood/Affect Anxious;Pleasant   Social/Behavioral Cooperative;Engaged   Goals addressed in session today pt actively engaged in group activity. pt chose a forest scene for his project. pt engaged in peer initiated conversation during group. pt was appreciative of radio channel change. pt shared he requires walk breaks during group when he get too anxious. pt shared he likes to paint and color      Medicare Cap   [x] Physical Therapy  [] Speech Therapy  [] Occupational therapy  *PT and Speech caps combine      $2040 Cap limit < kx modifier needed        Patient Name: Brandy Mays: 1954    Note:  This is an estimate of charges billed.      Date of [address] Name # units/ charge $$$ charge Daily Total Charge Ongoing Total $$$   11/12/20 PT eval  Therex  Manual 1+1+1 82.82+23.22+21.70  127.74   11/16/20 2TE, 1Man (PTA)  2+1 25.26+19.74+18.45 63.45 191.19   11/19 2 manual  1 therex 2+1 27.39+21.70+23.22 72.31 263.5   11/24 2 manual  1 therex 2+1 27.39+21.70+23.22 72.31 335.81   12/1 2 manual  1 therex 2+1 27.39+21.70+23.22 72.31 408.12   12/3 2 manual  1 therex 2+1 27.39+21.70+23.22 72.31 480.43   12/8 2 manual   1 therex 2+1 27.39+21.70+23.22 72.31 552.74   12/10 2 manual  1 therex 2+1 27.39+21.70+23.22 72.31 625.05   12/15 2 manual  1 therex 2+1 27.39+21.70+23.22 72.31    12/17 2 manual  1 therex 2+1 27.39+21.70+23.22 72.31 769.67   12/22 2 manual  2 therex 2+2 29.72+23.22+21.70+21.70 96.34 866.01

## 2021-11-29 NOTE — PLAN OF CARE
Problem: Behavioral Health Plan of Care  Goal: Adheres to Safety Considerations for Self and Others  Outcome: Improving  Goal: Absence of New-Onset Illness or Injury  Outcome: Improving   Patient had unremarkable night, seems to be sleeping during safety checks with no behavior issues, patient observed sleeping with some expiratory grunts/ sleep apnea with lesser intensity compared to yesterday's. Patient did not complain of pain or anxiety, endorses no SI/HI, A/VH or SIB. No evidence of pain or distress and patient achieves greater than 7 hours of uninterrupted sleep.    Earle Mancini DNP, RN, APRN, CNS, AGCNS-BC.

## 2021-11-29 NOTE — PLAN OF CARE
Out and active on unit at the start of the shift, cooperative.  Talked in 1:1 about addiction, etc., endorses high anxiety and AH.  He worked on a jigsaw puzzle for awhile on  the unit, initially didn't want klonopin for anxiety but did take it about 1800.    Noted to do some pacing later and using headphone radio, Prakash c/o AH and feeling like giving in to them (wanting to die) because he is tired of this.  Prakash did say he would not hurt himself even if an opportunity presented. He went to his room and layed down about 1930, not waking for snack.  Appeared to sleep soundly until woken for HS meds about 2215 at which time he said he went to sleep d/t the voices, that it was easier to cope.  Med compliant.

## 2021-11-29 NOTE — PLAN OF CARE
"Assessment/Intervention, Care Coordination and Current Symptoms:   CTC met with patient post morning IDT meeting to introduce self and begin developing rapport. Patient was in his room when writer approached. He was calm and pleasant when speaking with CTC. Patient stated, \"I want to lower my anxiety and make the voices more manageable.\" CTC attempted to contact patient's , Vero Arreaga with R. Voice mail was left as CM is out-of-the-office until 11/30/21. CTC seeking clarification on CM's intent to file revocation papers on patient. Patient signed VAL's for his psychiatrist, Regine Last; his , Vero Arreaga; and his [facility] manager, Joshua Lyle. CTC agreed to follow-up with patient after speaking with his R .     Writer will continue to follow the patient and identify his needs, then work in tandem to develop a discharge plan that address his needs.      Discharge Plan or Goal:  TBD pending consultation with attending psychiatrist and .     Barriers to Discharge:  commitment, symptom stabilization, medication management, coordination of care     Referral Status:  None at this time     Legal Status:  Commitment. (pending revocation by R )      Dom Carnes MA, [facility], 11/29/2021, 2:21 PM        "

## 2021-11-29 NOTE — PROGRESS NOTES
PSYCHIATRY PROGRESS NOTE         DATE OF SERVICE:   11/29/2021         CHIEF COMPLAINT:     Auditory hallucinations, anxiety, revocation of provisional discharge under commitment        OBJECTIVE:     Nursing reports : Patient less isolative, spends more time in the lounge, takes medications, Seroquel helps for anxiety   reports working on outpatient referrals         SUBJECTIVE:      Prakash  is a 31-year-old transgender female to male.  He wants to be referred as he/ him.   He has schizoaffective disorder bipolar type, PTSD, ADHD and polysubstance abuse.  He was seen in the emergency room on 11/26/2021 for evaluation of anxiety for 2 days.  He reported that he had run out of Klonopin 2 days prior to that visit.  He picked up Klonopin prescription on 11/12/2021 according to the chart.  He denied suicidal and homicidal ideas.  He reported auditory hallucinations saying bad things such as to kill himself which was not his baseline.  He reported decreased sleep for couple of days prior to admission.  He took bedtime Seroquel at 4 PM to help with anxiety but he continued to be anxious.  The group home staff reported that they found prescription drugs in his room that did not belong to him.    He says he was hospitalized first time in 2011 for overdose, and his last admission was in 2019 for mood lability and haven.  He says at that time he was manic.  He reports 3 suicide attempts with accidental overdose and carbon monoxide poisoning.  He had 2 CD [place]  for marijuana, amphetamines and opioids In 2015 and 2017.   He says he was on multiple medication trials and he had more than 25 psychiatric hospitalizations since 2011.  He reports 2 civil commitment in 2017 and 2019.  He says he has been under extended commitment since 2019.  He has psychotherapist, psychiatrist,  and arms worker.  His home medications are listed as: Lexapro 10 mg every morning, Klonopin 1 mg twice daily as needed, Vyvanse 50  mg every morning, Prolixin 25 mg IM every 14 days, due December 1, 2021, Seroquel 150 mg nightly, Neurontin 1200 mg daily and 900 mg at at bedtime, Lithobid 300 mg every morning and 900 mg at at bedtime, Narcan spray for opioid reversal 4 mg every 2 to 3 minutes into 1 nostril, alternating nostrils, clindamycin 1% external gel topically twice daily, nicotine gum 2 mg every hour as needed, Prilosec 20 mg daily, Zofran 4 mg 3 times daily as needed, Depo testosterone 200 mg/mL, 0.2 mL subcutaneously weekly.  He is on provisional discharge and according to the chart  is revoking provisional discharge.  He is agitated.  He is angry that staff reported that there were medications found in his room that did not belong to him.  He says he does not abuse medications.  He has auditory hallucinations.  He is paranoid.  He denies homicidal ideas.  He is due for Prolixin decanoate on December 1.  He wants increased Seroquel for psychosis.  He refused to sign neuroleptic consent.  He started hitting the glass on the nursing station.  He had to be redirected.  Nurse later informed me that he signed neuroleptic consent.    I reviewed his last admission from April 9 to April 15, 2019.  He had command hallucinations telling him to hurt himself and he was acting out and it by jumping in front of a moving vehicle.  He was put on 72-hour hold and commitment was filed and supported.  He reported benefit from Stelazine which was increased to 2 mg 3 times daily.  Hallucinations improved but there is still present on the day of discharge.  He was discharged on Stelazine 2 mg 3 times daily, Ambien 10 mg nightly as needed and trazodone 50 mg nightly as needed           MEDICATIONS:       clindamycin   Topical BID     escitalopram  10 mg Oral Daily     [START ON 12/1/2021] fluPHENAZine decanoate  25 mg Intramuscular Q14 Days     gabapentin  1,200 mg Oral Daily     gabapentin  900 mg Oral BID     lithium ER  300 mg Oral QAM      "lithium ER  900 mg Oral At Bedtime     nicotine  1 patch Transdermal Daily     nicotine   Transdermal Q8H     pantoprazole  40 mg Oral Daily     QUEtiapine  150 mg Oral At Bedtime     [START ON 12/1/2021] testosterone cypionate  0.2 mL Intramuscular Q14 Days     acetaminophen, alum & mag hydroxide-simethicone, clonazePAM, hydrOXYzine, nicotine, OLANZapine **OR** OLANZapine, ondansetron, polyethylene glycol, QUEtiapine, traZODone    Medication adherence: Yes  Medication side effects: No  Benefit: Symptom reduction         ROS:   As per history of present illness, otherwise reminder of review of systems is negative for: General, eyes, ears, nose, throat, neck, respiratory, cardiovascular, gastrointestinal, genitourinary, meniscal skeletal, neurological, hematological, dermatological and endocrine system.         MENTAL STATUS EXAM:   BP (!) 151/103 (Patient Position: Sitting)   Pulse 100   Temp 97.7  F (36.5  C) (Oral)   Resp 22   Ht 1.651 m (5' 5\")   Wt 104 kg (229 lb 3.2 oz)   SpO2 100%   BMI 38.14 kg/m      Appearance:fair hygiene  Orientation: Alert and oriented x3  Speech:fluent  Language ability: intact  Thought process: Perseverates  Thought content: Positive for auditory hallucinations telling him to kill himself  Associations: Connected  Suicidal Ideation: Present  Homicidal Ideation: Denies  Mood: Labile  Affect: Labile  Intellectual functioning:average  Fund of Knowledge: Consistent with education and experience  Attention/Concentration: decreased  Memory: intact  Psychomotor Behavior:  Agitated  Muscle Strength and Tone: no atrophy or involuntary movement  Gait and Station: steady  Insight and judgement: Impaired, under commitment         LABS:   personally reviewed.   Lab Results   Component Value Date     11/26/2021     10/06/2021     05/19/2021     01/10/2021     12/17/2020    CO2 26 11/26/2021    CO2 21 10/06/2021    CO2 19 05/19/2021    CO2 21 01/10/2021    CO2 19 " 12/17/2020    BUN 16 11/26/2021    BUN 12 10/06/2021    BUN 11 05/19/2021    BUN 13 01/10/2021    BUN 11 12/17/2020     No results found for: CKTOTAL, CKMB, TROPONINI  Lab Results   Component Value Date    WBC 12.6 11/26/2021    WBC 12.4 10/06/2021    WBC 13.1 05/19/2021    WBC 12.0 01/10/2021    WBC 12.4 12/15/2020    HGB 14.0 11/26/2021    HGB 13.1 10/06/2021    HGB 13.6 05/19/2021    HGB 12.2 03/01/2021    HGB 13.0 01/10/2021    HCT 42.6 11/26/2021    HCT 40.8 10/06/2021    HCT 41.6 05/19/2021    HCT 39.8 01/10/2021    HCT 38.9 12/15/2020    MCV 81 11/26/2021    MCV 82 10/06/2021    MCV 84 05/19/2021    MCV 83 01/10/2021    MCV 85 12/15/2020     11/26/2021     10/06/2021     05/19/2021     01/10/2021     12/18/2020     Lab Results   Component Value Date    CHOL 181 04/28/2021    CHOL 188 03/01/2021    CHOL 230 10/23/2019    TRIG 317 04/28/2021    TRIG 358 03/01/2021    TRIG 211 10/23/2019    HDL 37 04/28/2021    HDL 66 03/01/2021    HDL 58 10/23/2019                DIAGNOSIS:     Schizoaffective disorder chronic with acute exacerbation  Posttraumatic stress disorder  ADHD combined type  Borderline personality disorder  Marijuana dependency    Patient Active Problem List   Diagnosis     ADHD (attention deficit hyperactivity disorder)     Bipolar 1 disorder, manic, mild     Marijuana abuse     Polysubstance abuse (H)     GERD (gastroesophageal reflux disease)     Tobacco abuse     Abdominal pain, right upper quadrant     Intractable back pain     Optic neuritis     Cauda equina syndrome with neurogenic bladder (H)     Schizoaffective disorder, bipolar type (H)     PTSD (post-traumatic stress disorder)     Anxiety     Auditory hallucination     Class 2 obesity due to excess calories in adult     Nephrolithiasis     Cyst of left ovary     Borderline personality disorder (H)     Cannabis dependence (H)     Depression     Episodic mood disorder (H)     History of heroin abuse (H)      Moderate episode of recurrent major depressive disorder (H)     Opioid use disorder, severe, dependence (H)     Substance-induced psychotic disorder with hallucinations (H)     Nausea     Overdose     Suicidal ideation     Bella (H)     Spell of altered consciousness     Urinary retention     Obesity (BMI 35.0-39.9) with comorbidity (H)     Chronic bilateral low back pain without sciatica     AVA (generalized anxiety disorder)     Aggressive behavior     Gender identity disorder     Lumbosacral radiculopathy at L5     DUB (dysfunctional uterine bleeding)     Seizure-like activity (H)     Elevated blood pressure reading without diagnosis of hypertension     Acanthosis nigricans     Viral gastroenteritis     Prediabetes     Psychosis (H)     Bipolar affective disorder, mixed, severe, with psychotic behavior (H)          PLAN:   Patient was admitted for anxiety and auditory hallucinations.  According to the chart it was reported that group home staff found prescription drugs in his room which did not belong to him.  He also reported that he ran out of Klonopin 2 days prior to the admission.  According to the chart he picked up his Klonopin on 11/12/2021.  He says his doctor prescribes it 1 week at the time.  According to the chart his  is in the process of revoking provisional discharge.   will coordinate it with the  we discussed diagnosis and treatment plan and patient agrees with the following recommendations:    Medications:  Prolixin 25 mg IM every 14 days, due December 1, 2021 for hallucinations,   Increase Seroquel 200 mg nightly for hallucinations and sleep,  Seroquel  mg twice daily as needed for anxiety  Neurontin 1200 mg daily at 2 pm  Gabapentin 900 mg twice daily for anxiety and mood stabilization,   Lithobid 300 mg every morning   Lithobid 900 mg twice daily for mood stabilization,   Lexapro 10 mg every morning for depression,   Discontinue  Klonopin  Discontinue Vyvanse    Narcan spray for opioid reversal 4 mg every 2 to 3 minutes into 1 nostril, alternating nostrils,   clindamycin 1% external gel topically twice daily,   nicotine gum 2 mg every hour as needed for nicotine use disorder  Prilosec 20 mg daily for heartburn,   Zofran 4 mg 3 times daily as needed nausea,   Depo testosterone 200 mg/mL, 0.2 mL subcutaneously weekly.  We discussed side effects, benefits and alternative treatments and patient agrees.  Rule 25 for chemical dependency treatment   will collect collateral information and make outpatient referrals  Staff to provide emotional support and redirect as needed  Patient encouraged to attend groups  Lab results: Reviewed personally  Consultation: According to patient symptom report    Risk Assessment: Auditory hallucinations, anxiety, revocation of provisional discharge under commitment    Coordination of Care:   Patient seen, medical record reviewed, care coordinated with the team.    Total time:  More than 35 minutes spent on this visit with more than 50% time  spent on coordination of care with staff, reviewing medical record, educating patient about treatment options, side effects and benefits and alternative treatments for medications, providing supportive therapy regarding above issues.    This document is created with the help of Dragon dictation system.  All grammatical/typing errors or context distortion are unintentional and inherent to software.    Gillian Andrade MD        Re-Certification I certify that the inpatient psychiatric facility services furnished since the previous certification were, and continue to be, medically necessary for, either, treatment which could reasonably be expected to improve the patient s condition or diagnostic study and that the hospital records indicate that the services furnished were, either, intensive treatment services, admission and related services necessary for diagnostic  study, or equivalent services.     I certify that the patient continues to need, on a daily basis, active treatment furnished directly by or requiring the supervision of inpatient psychiatric facility personnel.   I estimate TBD days of hospitalization is necessary for proper treatment of the patient. My plans for post-hospital care for this patient are : Medications, appointments     Gillian Andrade MD

## 2021-11-30 ENCOUNTER — APPOINTMENT (OUTPATIENT)
Dept: RADIOLOGY | Facility: CLINIC | Age: 31
End: 2021-11-30
Attending: NURSE PRACTITIONER
Payer: COMMERCIAL

## 2021-11-30 LAB
ATRIAL RATE - MUSE: 76 BPM
DIASTOLIC BLOOD PRESSURE - MUSE: NORMAL MMHG
INTERPRETATION ECG - MUSE: NORMAL
P AXIS - MUSE: 31 DEGREES
PR INTERVAL - MUSE: 158 MS
QRS DURATION - MUSE: 98 MS
QT - MUSE: 390 MS
QTC - MUSE: 438 MS
R AXIS - MUSE: 25 DEGREES
SYSTOLIC BLOOD PRESSURE - MUSE: NORMAL MMHG
T AXIS - MUSE: 27 DEGREES
VENTRICULAR RATE- MUSE: 76 BPM

## 2021-11-30 PROCEDURE — 93005 ELECTROCARDIOGRAM TRACING: CPT

## 2021-11-30 PROCEDURE — 73120 X-RAY EXAM OF HAND: CPT | Mod: RT

## 2021-11-30 PROCEDURE — 250N000013 HC RX MED GY IP 250 OP 250 PS 637: Performed by: PSYCHIATRY & NEUROLOGY

## 2021-11-30 PROCEDURE — 999N000157 HC STATISTIC RCP TIME EA 10 MIN

## 2021-11-30 PROCEDURE — 250N000013 HC RX MED GY IP 250 OP 250 PS 637: Performed by: NURSE PRACTITIONER

## 2021-11-30 PROCEDURE — 99232 SBSQ HOSP IP/OBS MODERATE 35: CPT | Performed by: PSYCHIATRY & NEUROLOGY

## 2021-11-30 PROCEDURE — 999N000054 HC STATISTIC EKG NON-CHARGEABLE

## 2021-11-30 PROCEDURE — 93005 ELECTROCARDIOGRAM TRACING: CPT | Performed by: PSYCHIATRY & NEUROLOGY

## 2021-11-30 PROCEDURE — 93010 ELECTROCARDIOGRAM REPORT: CPT | Performed by: INTERNAL MEDICINE

## 2021-11-30 PROCEDURE — 128N000001 HC R&B CD/MH ADULT

## 2021-11-30 RX ORDER — QUETIAPINE FUMARATE 50 MG/1
50 TABLET, FILM COATED ORAL 3 TIMES DAILY PRN
Status: DISCONTINUED | OUTPATIENT
Start: 2021-11-30 | End: 2021-12-01

## 2021-11-30 RX ORDER — AMLODIPINE BESYLATE 5 MG/1
5 TABLET ORAL DAILY
Status: DISCONTINUED | OUTPATIENT
Start: 2021-11-30 | End: 2021-12-01

## 2021-11-30 RX ORDER — ACETAMINOPHEN 325 MG/1
975 TABLET ORAL 2 TIMES DAILY
Status: DISCONTINUED | OUTPATIENT
Start: 2021-11-30 | End: 2021-12-01

## 2021-11-30 RX ORDER — FLUPHENAZINE DECANOATE 25 MG/ML
25 INJECTION, SOLUTION INTRAMUSCULAR; SUBCUTANEOUS
Status: DISCONTINUED | OUTPATIENT
Start: 2021-12-01 | End: 2021-12-10 | Stop reason: HOSPADM

## 2021-11-30 RX ADMIN — NICOTINE POLACRILEX 2 MG: 2 GUM, CHEWING BUCCAL at 08:53

## 2021-11-30 RX ADMIN — LITHIUM CARBONATE 900 MG: 300 TABLET, FILM COATED, EXTENDED RELEASE ORAL at 20:08

## 2021-11-30 RX ADMIN — GABAPENTIN 1200 MG: 400 CAPSULE ORAL at 14:03

## 2021-11-30 RX ADMIN — Medication 1 PATCH: at 08:53

## 2021-11-30 RX ADMIN — ACETAMINOPHEN 975 MG: 325 TABLET ORAL at 20:08

## 2021-11-30 RX ADMIN — QUETIAPINE FUMARATE 50 MG: 50 TABLET ORAL at 16:16

## 2021-11-30 RX ADMIN — CLINDAMYCIN PHOSPHATE: 10 GEL TOPICAL at 08:54

## 2021-11-30 RX ADMIN — GABAPENTIN 900 MG: 300 CAPSULE ORAL at 08:53

## 2021-11-30 RX ADMIN — NICOTINE POLACRILEX 2 MG: 2 GUM, CHEWING BUCCAL at 17:08

## 2021-11-30 RX ADMIN — QUETIAPINE FUMARATE 200 MG: 200 TABLET ORAL at 20:09

## 2021-11-30 RX ADMIN — OLANZAPINE 10 MG: 10 TABLET, FILM COATED ORAL at 17:08

## 2021-11-30 RX ADMIN — AMLODIPINE BESYLATE 5 MG: 5 TABLET ORAL at 10:40

## 2021-11-30 RX ADMIN — NICOTINE POLACRILEX 2 MG: 2 GUM, CHEWING BUCCAL at 14:06

## 2021-11-30 RX ADMIN — ESCITALOPRAM OXALATE 10 MG: 10 TABLET ORAL at 08:54

## 2021-11-30 RX ADMIN — NICOTINE POLACRILEX 2 MG: 2 GUM, CHEWING BUCCAL at 11:47

## 2021-11-30 RX ADMIN — HYDROXYZINE HYDROCHLORIDE 25 MG: 25 TABLET, FILM COATED ORAL at 17:57

## 2021-11-30 RX ADMIN — PANTOPRAZOLE SODIUM 40 MG: 40 TABLET, DELAYED RELEASE ORAL at 08:53

## 2021-11-30 RX ADMIN — ACETAMINOPHEN 650 MG: 325 TABLET ORAL at 08:53

## 2021-11-30 RX ADMIN — GABAPENTIN 900 MG: 300 CAPSULE ORAL at 20:09

## 2021-11-30 RX ADMIN — CLINDAMYCIN PHOSPHATE: 10 GEL TOPICAL at 20:09

## 2021-11-30 RX ADMIN — HYDROXYZINE HYDROCHLORIDE 25 MG: 25 TABLET, FILM COATED ORAL at 11:51

## 2021-11-30 RX ADMIN — ACETAMINOPHEN 975 MG: 325 TABLET ORAL at 14:03

## 2021-11-30 RX ADMIN — LITHIUM CARBONATE 300 MG: 300 TABLET, FILM COATED, EXTENDED RELEASE ORAL at 08:54

## 2021-11-30 ASSESSMENT — ACTIVITIES OF DAILY LIVING (ADL)
DRESS: INDEPENDENT
HYGIENE/GROOMING: INDEPENDENT
ORAL_HYGIENE: INDEPENDENT
HYGIENE/GROOMING: INDEPENDENT
DRESS: INDEPENDENT
ORAL_HYGIENE: INDEPENDENT

## 2021-11-30 ASSESSMENT — MIFFLIN-ST. JEOR: SCORE: 1750.08

## 2021-11-30 NOTE — PLAN OF CARE
"  Problem: Sleep Disturbance  Goal: Adequate Sleep/Rest  Outcome: Improving     Problem: Suicidal Behavior  Goal: Suicidal Behavior is Absent or Managed  Outcome: Improving     Problem: Activity and Energy Impairment (Anxiety Signs/Symptoms)  Goal: Optimized Energy Level (Anxiety Signs/Symptoms)  Outcome: Improving     Problem: Mood Impairment (Anxiety Signs/Symptoms)  Goal: Improved Mood Symptoms (Anxiety Signs/Symptoms)  Outcome: Improving   Patient is visible on unit, engaged with staff and selective peers. His behavior is calm and cooperative. Pt denied of depression, rated anxiety low. Pt endorses auditory hallucinations, stated \" the voice is telling me to kill myself but I am not going to do it\". Pt's BP is  high : 172/98 and  159/127. Hospitalist saw pt this am. Norvasc was ordered and it was given. Rechecked BP for 145/115 and 147/93. Texted information to hospitalist. Atarax was given for anxiety with some relief. X-ray for right hand was done to rule out frature. Pt punched the window yesterday. X-ray shows no Fracture.  Right hand is swollen. Pt has difficulty moving right middle finger. Prn tylenol given this for hand pain rated 5/10 with some relief.  EKG was done. Pt is on 72 hrs hold, started at 1143 on 11/30/21. A copy of patient right provided to patient . Explained meds care plan and activities with patient.      "

## 2021-11-30 NOTE — PROGRESS NOTES
PSYCHIATRY PROGRESS NOTE         DATE OF SERVICE:   11/30/2021         CHIEF COMPLAINT:     Auditory hallucinations commanding the patient to kill himself, anxiety, depression and mood lability, elevated blood pressure          OBJECTIVE:     Nursing reports : Patient is going to groups, taking medications, visible on the unit     reports working on outpatient referrals    Hospitalist consult by Nancy Alfaro CNP on 11/30/2021  For elevated blood pressure amlodipine started  For hand trauma after hitting the fiberglass, x-ray ordered  For elevated TSH with normal free T4, recommended to repeat TSH in 4 to 6 weeks.  Please see full note for details         SUBJECTIVE:      Prakash reports hallucinations telling him to kill himself.  He is not homicidal.  He has decreased sleep, decreased energy, decreased concentration.  We discussed his agitation and aggression from last night.  He eventually signed neuroleptic consent and he took Seroquel.  He says that it helps with sleep.  He is due for IM Prolixin tomorrow.  He has impaired insight and judgment.  He asks where he can leave.  I explained to him that  talked with his outpatient  and revocation of provisional discharge is started and I will put him on 72-hour hold.  He is not safe for discharge due to auditory hallucinations telling him to kill himself.  He has been under extended commitment since May 2019.  He says that he was angry because I was asking about medications that were found in his room.  He says that the medications did not belong to another patient.  He says they were his medications that he picked up from the pharmacy, but did not give to the staff.  He says it was Seroquel, he says it was not controlled substance.  He is visible on the unit.  No altercations with staff or patients.  He has high blood pressure.  He was evaluated by hospitalist yesterday.  Last night he hit the glass on the nursing station  "with his right hand.  Hospitalist ordered x-ray.  I ordered EKG for QT QTC on Prolixin and Seroquel.  It is normal of 390/438.  We discussed risk of prolongation of QT QTC and arrhythmia on 2 antipsychotics and he understands it.         MEDICATIONS:       - Psychiatric Emergency -   Does not apply See Admin Instructions     acetaminophen  975 mg Oral BID     amLODIPine  5 mg Oral Daily     clindamycin   Topical BID     escitalopram  10 mg Oral Daily     [START ON 12/1/2021] fluPHENAZine decanoate  25 mg Intramuscular Q14 Days     gabapentin  1,200 mg Oral Daily     gabapentin  900 mg Oral BID     lithium ER  300 mg Oral QAM     lithium ER  900 mg Oral At Bedtime     nicotine  1 patch Transdermal Daily     nicotine   Transdermal Q8H     pantoprazole  40 mg Oral Daily     QUEtiapine  200 mg Oral At Bedtime     [START ON 12/1/2021] testosterone cypionate  0.2 mL Subcutaneous Q7 Days     acetaminophen, alum & mag hydroxide-simethicone, hydrOXYzine, nicotine, OLANZapine **OR** OLANZapine, ondansetron, polyethylene glycol, QUEtiapine, traZODone    Medication adherence: Yes  Medication side effects: No  Benefit: Symptom reduction         ROS:   As per history of present illness, otherwise reminder of review of systems is negative for: General, eyes, ears, nose, throat, neck, respiratory, cardiovascular, gastrointestinal, genitourinary, meniscal skeletal, neurological, hematological, dermatological and endocrine system.         MENTAL STATUS EXAM:   BP (!) 153/101   Pulse 111   Temp 98.6  F (37  C) (Oral)   Resp 22   Ht 1.651 m (5' 5\")   Wt 103.4 kg (228 lb)   SpO2 96%   BMI 37.94 kg/m      Appearance:fair hygiene, cooperative  Orientation:x3  Speech:fluent  Language ability: intact  Thought process: concrete  Thought content: Positive for auditory hallucinations giving command to kill himself  Associations: Connected  Suicidal Ideation: Present  Homicidal Ideation: Denies  Mood: Labile, depressed  Affect: " Irritable  Intellectual functioning:average  Fund of Knowledge: Consistent with education and experience  Attention/Concentration: decreased  Memory: intact  Psychomotor Behavior:  Mild agitation  Muscle Strength and Tone: no atrophy or involuntary movement  Gait and Station: steady  Insight and judgement:impaired, under extended commitment, going through revocation of provisional discharge         LABS:   personally reviewed.   Lab Results   Component Value Date     11/26/2021     10/06/2021     05/19/2021     01/10/2021     12/17/2020    CO2 26 11/26/2021    CO2 21 10/06/2021    CO2 19 05/19/2021    CO2 21 01/10/2021    CO2 19 12/17/2020    BUN 16 11/26/2021    BUN 12 10/06/2021    BUN 11 05/19/2021    BUN 13 01/10/2021    BUN 11 12/17/2020     No results found for: CKTOTAL, CKMB, TROPONINI  Lab Results   Component Value Date    WBC 12.6 11/26/2021    WBC 12.4 10/06/2021    WBC 13.1 05/19/2021    WBC 12.0 01/10/2021    WBC 12.4 12/15/2020    HGB 14.0 11/26/2021    HGB 13.1 10/06/2021    HGB 13.6 05/19/2021    HGB 12.2 03/01/2021    HGB 13.0 01/10/2021    HCT 42.6 11/26/2021    HCT 40.8 10/06/2021    HCT 41.6 05/19/2021    HCT 39.8 01/10/2021    HCT 38.9 12/15/2020    MCV 81 11/26/2021    MCV 82 10/06/2021    MCV 84 05/19/2021    MCV 83 01/10/2021    MCV 85 12/15/2020     11/26/2021     10/06/2021     05/19/2021     01/10/2021     12/18/2020     Lab Results   Component Value Date    CHOL 181 04/28/2021    CHOL 188 03/01/2021    CHOL 230 10/23/2019    TRIG 317 04/28/2021    TRIG 358 03/01/2021    TRIG 211 10/23/2019    HDL 37 04/28/2021    HDL 66 03/01/2021    HDL 58 10/23/2019       Recent Results (from the past 24 hour(s))   ECG 12-LEAD WITH MUSE (LHE)    Collection Time: 11/30/21 11:28 AM   Result Value Ref Range    Systolic Blood Pressure  mmHg    Diastolic Blood Pressure  mmHg    Ventricular Rate 76 BPM    Atrial Rate 76 BPM    ME Interval 158 ms     QRS Duration 98 ms     ms    QTc 438 ms    P Axis 31 degrees    R AXIS 25 degrees    T Axis 27 degrees    Interpretation ECG       Sinus rhythm  Normal ECG  When compared with ECG of 19-MAY-2021 12:45,  No significant change was found              DIAGNOSIS:     Schizoaffective disorder chronic with acute exacerbation  Posttraumatic stress disorder  ADHD combined type  Borderline personality disorder  Marijuana dependency    Patient Active Problem List   Diagnosis     ADHD (attention deficit hyperactivity disorder)     Bipolar 1 disorder, manic, mild     Marijuana abuse     Polysubstance abuse (H)     GERD (gastroesophageal reflux disease)     Tobacco abuse     Abdominal pain, right upper quadrant     Intractable back pain     Optic neuritis     Cauda equina syndrome with neurogenic bladder (H)     Schizoaffective disorder, bipolar type (H)     PTSD (post-traumatic stress disorder)     Anxiety     Auditory hallucination     Class 2 obesity due to excess calories in adult     Nephrolithiasis     Cyst of left ovary     Borderline personality disorder (H)     Cannabis dependence (H)     Depression     Episodic mood disorder (H)     History of heroin abuse (H)     Moderate episode of recurrent major depressive disorder (H)     Opioid use disorder, severe, dependence (H)     Substance-induced psychotic disorder with hallucinations (H)     Nausea     Overdose     Suicidal ideation     Bella (H)     Spell of altered consciousness     Urinary retention     Obesity (BMI 35.0-39.9) with comorbidity (H)     Chronic bilateral low back pain without sciatica     AVA (generalized anxiety disorder)     Aggressive behavior     Gender identity disorder     Lumbosacral radiculopathy at L5     DUB (dysfunctional uterine bleeding)     Seizure-like activity (H)     Elevated blood pressure reading without diagnosis of hypertension     Acanthosis nigricans     Viral gastroenteritis     Prediabetes     Psychosis (H)     Bipolar  affective disorder, mixed, severe, with psychotic behavior (H)          PLAN:   Patient will be put on 72-hour hold.  He asks when he can be discharged.  He has auditory hallucinations telling him to kill himself.  He is not safe for discharge.  His  is in the process of filing for revocation of provisional discharge.  We discussed diagnosis and treatment plan and these are recommendations for treatment::    Medications:  Prolixin 25 mg IM every 14 days, due December 1, 2021 for hallucinations,   Increase Seroquel 200 mg nightly for hallucinations and sleep,  Seroquel  mg twice daily as needed for anxiety  Neurontin 1200 mg daily at 2 pm  Gabapentin 900 mg twice daily for anxiety and mood stabilization,   Lithobid 300 mg every morning   Lithobid 900 mg twice daily for mood stabilization,   Lexapro 10 mg every morning for depression,   Discontinue Klonopin  Discontinue Vyvanse    Narcan spray for opioid reversal 4 mg every 2 to 3 minutes into 1 nostril, alternating nostrils,   clindamycin 1% external gel topically twice daily,   nicotine gum 2 mg every hour as needed for nicotine use disorder  Prilosec 20 mg daily for heartburn,   Zofran 4 mg 3 times daily as needed nausea,   Depo testosterone 200 mg/mL, 0.2 mL subcutaneously weekly  We discussed side effects, benefits and alternative treatments and patient agrees with capacity to do so.  Rule 25 for chemical dependency treatment   will collect collateral information and make outpatient referrals  Staff to provide emotional support and redirect as needed  Patient encouraged to attend groups  Lab results: Reviewed personally  Consultation: Hospitalist consult for elevated blood pressure and right head injury after hitting fiberglass    Risk Assessment: Auditory hallucinations with command to kill himself, going through revocation of provisional discharge    Coordination of Care:   Patient seen, medical record reviewed, care coordinated  with the team.       This document is created with the help of Dragon dictation system.  All grammatical/typing errors or context distortion are unintentional and inherent to software.    Gillian Andrade MD        Re-Certification I certify that the inpatient psychiatric facility services furnished since the previous certification were, and continue to be, medically necessary for, either, treatment which could reasonably be expected to improve the patient s condition or diagnostic study and that the hospital records indicate that the services furnished were, either, intensive treatment services, admission and related services necessary for diagnostic study, or equivalent services.     I certify that the patient continues to need, on a daily basis, active treatment furnished directly by or requiring the supervision of inpatient psychiatric facility personnel.   I estimate TBD days of hospitalization is necessary for proper treatment of the patient. My plans for post-hospital care for this patient are : Medications, appointments     Gillian Andrade MD

## 2021-11-30 NOTE — PLAN OF CARE
"  Problem: Activity and Energy Impairment (Anxiety Signs/Symptoms)  Goal: Optimized Energy Level (Anxiety Signs/Symptoms)  Outcome: No Change     Problem: Mood Impairment (Anxiety Signs/Symptoms)  Goal: Improved Mood Symptoms (Anxiety Signs/Symptoms)  Outcome: No Change     Problem: Pain Chronic (Persistent)  Goal: Acceptable Pain Control and Functional Ability  Outcome: Improving     Problem: Suicidal Behavior  Goal: Suicidal Behavior is Absent or Managed  Outcome: Declining     Pt is withdrawn. Pt engaged with select staff. Pt uses headphones. Behavior is calm and cooperative. Pt has poor concentration and insight and is disoriented to time. Pt endorses auditory hallucinations that are \"always there\", and ,\"they are softer when I am taking my meds\". Pt endorses anxiety rated 7/10. Pt visits with doctor and verbalizes anger after visit regarding his situation. Pt asks, \"can I hit something---the wall?\". Pt requests Zyprexa PRN for agitation. Pt later punches fiberglass wall of nursing station (see significant event note). Pt is redirectable and calm afterwards. Pt also initially refusing to sign neuroleptic consent form presented by MD. Pt later signs form and requests Seroquel 200 mg at bedtime. Verbal order entered per MD, who will sign NCF tomorrow. Pt goes to room and is currently sleeping with even respirations. Will approach with hs medications if awake. Dinner intake is 100%, pt sleeping during snack. Nicotine patch in place for nicotine withdrawal. Pt requests nicotine gum at 1626. Pt verbalizes no further concerns. Staff will continue to monitor.  Amarilis June RN    "

## 2021-11-30 NOTE — PROGRESS NOTES
LEGAL:  Order for MI Recommitment was issued on January 27, 2021 in [facility] [address] [facility] with an expiration date of February 4, 2022.  File number is [id number]. No Ashley.   No Intent to revoke has been served to [facility].and there is no Ex Parte Order as of this date and time 11/30/21 at 0925. DANILO Foster

## 2021-11-30 NOTE — CONSULTS
"[facility]  Consult Note - Hospitalist Service     Date of Admission:  11/27/2021  Consult Requested by: Dr Andrade  Reason for Consult: patient punched wall, hand injury    Assessment & Plan   Ayana \"Prakash\"  VINCENT Prasad is a 31 year old transgender male with preferred pronouns he/him who presented to [facility] emergency department for mental health evaluation, anxiety.  Patient lives in a group home and was brought in by a roommate.  His roommate reported that patient had been hearing more voices the last 2 days.  Past medical history includes schizoaffective disorder, bipolar type, ADHD, PTSD, polysubstance abuse.  Hospital medicine consult for evaluation after patient punched the wall with a right hand injury.     #Hand trauma, punched wall  -Patient had a behavioral escalation after the psychiatrist informed him that commitment would be pursued.  Patient punched the fiberglass wall in the lounge area  -Right second and third knuckle swelling. CMS intact, no circulatory compromise  - treat with ice pack and tylenol as needed. No imaging at this time. Will reassess tomorrow     #Schizoaffective disorder, bipolar type  -Psychiatry managing     Total floor/unit time spent was 20 minutes in reviewing the record, medications, lab results and completing documentation. 12 minutes spent in counseling and discussion with patient regarding diagnosis, medications and treatment plan. Care discussed and coordinated with patient and nurse.     BROOKLYN Cuadra CNP  [facility]  Securely message with the Vocera Web Console (learn more here)  Text page via [employer] Paging/Directory    ______________________________________________________________________    Chief Complaint   Right hand trauma, punched the wall    History is obtained from the patient and staff report    History of Present Illness   Patient was sitting in the lounge area.  Nursing staff reported that the psychiatrist " told the patient they would be seeking commitment.  The patient had escalated behavior and punched the fiberglass wall in the lounge area causing a hand injury.  Nursing noticing no broken skin but some swelling along the right third knuckle.  I did observe this area, and patient has some swelling and bruising of the proximal knuckle of middle finger. Rates pain 3/10, able to move.     Review of Systems   The 10 point Review of Systems is negative other than noted in the HPI    Past Medical History    I have reviewed this patient's medical history and updated it with pertinent information if needed.   Past Medical History:   Diagnosis Date     ADHD (attention deficit hyperactivity disorder)      Bipolar 1 disorder      Borderline personality disorder      Cauda equina syndrome      Chronic low back pain      Depression      GERD (gastroesophageal reflux disease)      h/o TBI (traumatic brain injury)      Marginal corneal ulcer, left 07/17/2015     Nephrolithiasis      obesity      Polysubstance abuse - methamphetamine, hallucinagen, heroin, marijuana     currently in remission     PONV (postoperative nausea and vomiting)      PTSD (post-traumatic stress disorder)        Past Surgical History   I have reviewed this patient's surgical history and updated it with pertinent information if needed.  Past Surgical History:   Procedure Laterality Date     BACK SURGERY  12/24/2016     BACK SURGERY - For Cauda Equina Syndrome  12/24/2016     COLONOSCOPY       COMBINED CYSTOSCOPY, INSERT STENT URETER(S) Left 8/30/2018    Procedure: COMBINED CYSTOSCOPY, INSERT STENT URETER(S);  Cystoscopy With Left Stent Placement;  Surgeon: Kiran Ulrich MD;  Location: [facility] OR     COMBINED CYSTOSCOPY, RETROGRADES, EXCHANGE STENT URETER(S) Left 10/14/2018    Procedure: COMBINED CYSTOSCOPY, RETROGRADES, EXCHANGE STENT URETER(S);  Cystoscopy and removal of left-sided stent.;  Surgeon: Stiven Olivo MD;  Location:  OR      COMBINED CYSTOSCOPY, RETROGRADES, URETEROSCOPY, INSERT STENT  1/6/2014    Procedure: COMBINED CYSTOSCOPY, RETROGRADES, URETEROSCOPY, INSERT STENT;  Cystyoscopy place left ureteral stent;  Surgeon: Jaun Kimble MD;  Location: [facility] OR     COMBINED CYSTOSCOPY, RETROGRADES, URETEROSCOPY, INSERT STENT Left 10/23/2018    Procedure: Video cystoscopy, left ureteroscopy with stone extraction;  Surgeon: Bull Mast MD;  Location:  OR     CYSTOSCOPY, URETEROSCOPY, COMBINED Right 9/23/2015    Procedure: COMBINED CYSTOSCOPY, URETEROSCOPY;  Surgeon: ROME Jett MD;  Location: [facility] OR     ENT SURGERY       ESOPHAGOSCOPY, GASTROSCOPY, DUODENOSCOPY (EGD), COMBINED  4/8/2013    Procedure: COMBINED ESOPHAGOSCOPY, GASTROSCOPY, DUODENOSCOPY (EGD), BIOPSY SINGLE OR MULTIPLE;  Gastroscopy;  Surgeon: Peter Pickard MD;  Location: [facility]     ESOPHAGOSCOPY, GASTROSCOPY, DUODENOSCOPY (EGD), COMBINED Left 10/28/2014    Procedure: COMBINED ESOPHAGOSCOPY, GASTROSCOPY, DUODENOSCOPY (EGD), BIOPSY SINGLE OR MULTIPLE;  Surgeon: Narcisa Ramirez MD;  Location:  OR     ESOPHAGOSCOPY, GASTROSCOPY, DUODENOSCOPY (EGD), COMBINED N/A 12/24/2018    Procedure: COMBINED ESOPHAGOSCOPY, GASTROSCOPY, DUODENOSCOPY (EGD), BIOPSY SINGLE OR MULTIPLE;  Surgeon: Sonu Verduzco MD;  Location: [facility]     INJECT EPIDURAL TRANSFORAMINAL LUMBAR / SACRAL EA ADDITIONAL LEVEL Left 3/18/2021    Procedure: Left L5/S1 transforaminal injection for selective L5 nerve root block;  Surgeon: Eliza Pagan MD;  [place] [facility] [place]     LAPAROSCOPIC CHOLECYSTECTOMY  11/20/2014    [facility], Dr. Ramirez     LASER HOLMIUM LITHOTRIPSY URETER(S), INSERT STENT, COMBINED  4/2/2014    Procedure: COMBINED CYSTOSCOPY, URETEROSCOPY, LASER HOLMIUM LITHOTRIPSY URETER(S), INSERT STENT;  Cystoscopy,Left Ureteral Stent Removal,Left Ureteroscopy with Laser Lithotripsy,Left Ureter Stent Placement;  Surgeon: ROME Jett MD;  Location: [facility] OR     Transurethral stone  resection  03/11/2011       Social History   I have reviewed this patient's social history and updated it with pertinent information if needed.  Social History     Tobacco Use     Smoking status: Former Smoker     Packs/day: 1.00     Years: 5.00     Pack years: 5.00     Types: Dip, chew, snus or snuff, Other     Start date: 8/1/2011     Smokeless tobacco: Former User     Types: Chew     Quit date: 12/17/2019     Tobacco comment: vape   Vaping Use     Vaping Use: Former   Substance Use Topics     Alcohol use: No     Alcohol/week: 0.0 standard drinks     Drug use: Not Currently     Types: Marijuana, Opiates     Comment: oxy, heroin (smoke)       Family History   I have reviewed this patient's family history and updated it with pertinent information if needed.  Family History   Problem Relation Age of Onset     Gastrointestinal Disease Father         Crohn's Disease     Cancer Father         Liver Cancer     Other Cancer Father         Liver     Obesity Father      Cancer Maternal Grandmother         lympoma     Diabetes Maternal Grandmother      Mental Illness Maternal Grandmother      Other Cancer Maternal Grandmother         Non Hodgkins Lymphoma     Diabetes Maternal Grandfather      Hypertension Maternal Grandfather      Prostate Cancer Maternal Grandfather      Hyperlipidemia Maternal Grandfather      Heart Disease Paternal Grandfather         heart disease     Hypertension Brother      Other Cancer Brother         Esophagecial     Diabetes Brother      Obesity Brother      Hyperlipidemia Mother      Mental Illness Mother      Anxiety Disorder Mother      Anesthesia Reaction Mother         Vomiting/Nausea     Hypertension Brother      Other Cancer Brother      Other Cancer Paternal Half-Brother         Esophageal     No Known Problems Sister        Medications   I have reviewed this patient's current medications    Allergies   Allergies   Allergen Reactions     Haldol [Haloperidol] Other (See Comments)     Makes  patient very angry and anxious     Adhesive Tape Hives     Percocet [Oxycodone-Acetaminophen] Nausea and Vomiting     Prednisone Other (See Comments) and Hives     Suicidal ideation     Risperidone Other (See Comments)     Tramadol Hcl Nausea and Vomiting     Droperidol Anxiety     Seroquel [Quetiapine] Palpitations     Spent 2 weeks in the hospital due to having seroquel, caused palpitations and QT prolongation       Physical Exam   Vital Signs: Temp: 97.7  F (36.5  C) Temp src: Oral BP: (!) 139/108 (Rechecked) Pulse: 93   Resp: 22 SpO2: 100 % O2 Device: None (Room air)    Weight: 229 lbs 3.2 oz  General: Alert, calm, no apparent distress  HEENT: Normocephalic, atraumatic, mucous membranes pink and moist, oropharynx without exudate, lymph nodes free and mobile  Respiratory: Lung sounds clear bilaterally, non-labored  Cardiovascular: S1, S2, rhythm rate regular, negative murmur, negative lower extremity edema  Gastrointestinal: Bowel sounds positive x4, nondistended and non-tender  Musculoskeletal: right hand 3rd knuckle proximal (middle finger) with mild swelling and bruising, cms intact  Neurological: Alert and oriented x3, speech intact, moves all extremities equally, sensation intact    Data   I personally reviewed no images or EKG's today.  No results found. However, due to the size of the patient record, not all encounters were searched. Please check Results Review for a complete set of results.  Most Recent 3 CBC's:Recent Labs   Lab Test 11/26/21  2303 10/06/21  2129 05/19/21  1314   WBC 12.6* 12.4* 13.1*   HGB 14.0 13.1 13.6   MCV 81 82 84    347 403     Most Recent 3 BMP's:Recent Labs   Lab Test 11/26/21  2303 10/06/21  2129 05/19/21  1314    136 140   POTASSIUM 3.6 4.2 4.0   CHLORIDE 105 108 113*   CO2 26 21 19*   BUN 16 12 11   CR 0.78 0.77 0.81   ANIONGAP 6 7 8   WILLIAMS 10.1 9.0 9.8   GLC 93 253* 101*     Most Recent 2 LFT's:Recent Labs   Lab Test 10/06/21  2129 04/28/21  0000 03/01/21  1558    AST 44 31 12   ALT 62 46* 21   ALKPHOS 82  --  56   BILITOTAL 0.3  --  0.2

## 2021-11-30 NOTE — PROGRESS NOTES
"Pt reported having strong suicidal thoughts, pt stated\" the voices is getting louder and telling me to kill myself\" staff talked with pt 1:1, offered pt prn olanzapine  Tab 10  mg. Pt was encouraged to stay in the lounge with staff and peers. Pt agreed. patient eating at this time, will monitor.  "

## 2021-11-30 NOTE — PLAN OF CARE
Assessment/Intervention, Care Coordination and Current Symptoms:   [facility] spoke with patient's MHR  whom was seeking updated information on patient status and wellbeing. After sharing patient progress and treatment team assessments with , it was reported that MHR is planing to file revocation papers.         Discharge Plan or Goal:  TBD pending filing for revocation and consultation with  and treatment team.      Barriers to Discharge:  Hold (MHR  filing revocation paperwork 11/30/21), symptom stabilization, medication management, coordination of care     Referral Status:   None at this time     Legal Status:  Per : Order for MI Recommitment was issued on January 27, 2021 in [facility] Court with an expiration date of February 4, 2022.  File number is [id number]. No Ashley.   [place] [facility] [place]and there is no Ex Parte Order.      Dom Carnes MA, [facility], 11/30/2021, 12:25 PM

## 2021-11-30 NOTE — PROGRESS NOTES
Received page from patient's nurse that patient was requesting flexeril for back muscle spasm.  When I saw patient today he was not complaining of back pain. Chart review - no prior clinic visits or ER visits for back pain.  I will not order flexeril at this time. Continue scheduled tylenol twice daily and as needed. Ice or heat to back for pain relief. Cannot have NSAIDs due to risk of lithium toxicity.   BP improved after Norvasc given; 147/93.  No further work-up.   Medicine will sign off.

## 2021-11-30 NOTE — PROGRESS NOTES
"[facility]    Medicine Progress Note - Hospitalist Service       Date of Admission:  11/27/2021    Brief Summary: Ayana \"Prakash\"  VINCENT Prasad is a 31 year old transgender male with preferred pronouns he/him who presented to [facility] emergency department for mental health evaluation, anxiety.  Patient lives in a group home and was brought in by a roommate.  His roommate reported that patient had been hearing more voices the last 2 days.  Past medical history includes schizoaffective disorder, bipolar type, ADHD, PTSD, polysubstance abuse.  Hospital medicine consult for evaluation after patient punched the wall with a right hand injury.      Assessment & Plan    #Elevated blood pressure without diagnosis of hypertension  - blood pressures have been high 152/96, 175/98, 151/103, baseline 130-140's systolic  - likely has some pre-hypertension with borderline primary hypertension  - will start amlodipine 5 mg daily with parameters to hold if sbp <100  - kidney function normal  -Blood pressure elevations likely secondary to psychiatric illness with some underlying prehypertension, and pain may also be contributing    #Hand trauma, punched wall  -11/29 Patient had a behavioral escalation after the psychiatrist informed him that commitment would be pursued.  Patient punched the fiberglass wall in the lounge area  -Right proximal third knuckle swelling. CMS intact, no circulatory compromise.  Less swollen with mild ecchymosis but had pain with palpation of this area and limited range of motion due to swelling and pain  - treat with ice pack and tylenol as needed.  Nurse was asking for ibuprofen to use, however he is on lithium and NSAIDs are typically contraindicated with this medication as it can lead to lithium toxicity.  I have scheduled Tylenol extra strength twice daily x3 days, and patient may also have Tylenol as needed.  Ice as needed for pain and swelling.    -Obtain right hand 2 view image to " rule out fracture     #Elevated TSH  #Subclinical hypothyroidism  -11/26/21 TSH 4.95, slightly elevated with free T4 within normal range at 0.87. on 11/26/2021.  Last TSH 4/15/2020 was 3.68, prior to that 3.62 and 1.06.   -TSH is not significantly elevated.  Patient denying symptoms of hypothyroidism including cold sensitivity, constipation, dry skin and unexplained weight gain. Does report feeling tired.   -Patient does follow-up regularly with a primary care provider outpatient.  I do not think patient's psychiatric symptoms are due to thyroid imbalance.  Recommend patient repeat TSH in 4-6 weeks at primary clinic.  The TSH has been trending up over the last couple of years which may be due to subclinical hypothyroidism, or transient elevations in TSH secondary to exacerbations of mental illness.      #Schizoaffective disorder, bipolar type  -Psychiatry managing       Diet: Regular Diet Adult    DVT Prophylaxis: Low Risk/Ambulatory with no VTE prophylaxis indicated  Styles Catheter: Not present  Central Lines: None  Code Status: Full Code      Disposition Plan   Expected discharge:  per psychiatry     Total floor/unit time spent was 20 minutes in reviewing the record, medications, lab results and completing documentation. 11 minutes spent in counseling and discussion with patient regarding diagnosis, medications and treatment plan. Care discussed and coordinated with patient and nurse. .    BROOKLYN [facility]  Hospitalist Service  [facility]  Securely message with the Vocera Web Console (learn more here)  Text page via [facility] Paging/Directory    ______________________________________________________________________    Interval History   According to nursing staff patient has been sleeping this morning it did not come out for breakfast.  Patient was lying in bed asleep, easily aroused.  He tells me that the right hand still hurts.  The swelling over the proximal third knuckle, right  hand is improved from last evening with some mild bruising.  Patient had significant pain when I palpated around this area today.  Some limited range of motion of the third finger due to the pain and swelling.  Using Tylenol as needed for pain and ice pack.  Currently rates the pain 5 out of 10.  No other complaints.  Denies headache, lightheadedness, cough, shortness of breath, chest pain, nausea.  No numbness or tingling of the right fingers.  We discussed the mildly elevated TSH.  Patient says he has felt tired but does not have any symptoms of cold intolerance, unexplained weight gain or dry skin.    Review of Systems: 10 point review of systems negative except for pertinent positives mentioned in HPI    Data reviewed today: I reviewed all medications, new labs and imaging results over the last 24 hours. I personally reviewed no images or EKG's today.    Physical Exam   Vital Signs: Temp: 98.6  F (37  C) Temp src: Oral BP: (!) 152/96 Pulse: 111   Resp: 22 SpO2: 96 % O2 Device: None (Room air)    Weight: 228 lbs 0 oz  General: Alert, calm, no apparent distress  HEENT: Normocephalic, atraumatic, mucous membranes pink and moist, oropharynx without exudate, lymph nodes free and mobile  Respiratory: Lung sounds clear bilaterally, non-labored  Cardiovascular: S1, S2, rhythm rate regular, negative murmur, negative lower extremity edema  Gastrointestinal: Bowel sounds positive x4, nondistended and non-tender  Musculoskeletal: right hand 3rd knuckle proximal (middle finger) with mild swelling and bruising, pain with palpation over this area, cms hand/fingers intact, skin intact  Neurological: Alert and oriented x3, speech intact, moves all extremities equally, sensation intact       Data   Recent Labs   Lab 11/26/21  2303   WBC 12.6*   HGB 14.0   MCV 81         POTASSIUM 3.6   CHLORIDE 105   CO2 26   BUN 16   CR 0.78   ANIONGAP 6   WILLIAMS 10.1   GLC 93     No results found for this or any previous visit (from  the past 24 hour(s)).  Medications       - Psychiatric Emergency -   Does not apply See Admin Instructions     acetaminophen  975 mg Oral BID     amLODIPine  5 mg Oral Daily     clindamycin   Topical BID     escitalopram  10 mg Oral Daily     gabapentin  1,200 mg Oral Daily     gabapentin  900 mg Oral BID     lithium ER  300 mg Oral QAM     lithium ER  900 mg Oral At Bedtime     nicotine  1 patch Transdermal Daily     nicotine   Transdermal Q8H     pantoprazole  40 mg Oral Daily     QUEtiapine  200 mg Oral At Bedtime     [START ON 12/1/2021] testosterone cypionate  0.2 mL Subcutaneous Q7 Days

## 2021-11-30 NOTE — PROGRESS NOTES
11/30/21 1059   Engagement   Intervention Group   Topic Detail OT Creative Expressions group-Paint by Sticker and bananagrams for social engagement, healthy distraction, focus, following directions, and symptom management   Attendance Attended   Patient Response Demonstrated understanding of materials provided;Asked questions and/or took notes;Expressed feelings/issues;Accepted feedback   Concentrated on Task duration of group   Cognition Goal-directed   Mood/Affect Anxious;Pleasant   Social/Behavioral Cooperative;Engaged   Goals addressed in session today pt started yarn basket project-shared it was causing frustration and anxiety. pt chose a clownfish paint by sticker design to complete during group. pt was polite and pleasant during interactions with staff and peers. pt assisted therapist with bananagrams activity and provided helpful word choices. pt was provided with a new stress ball at end of group. pt shared he likes coloring as a coping skill

## 2021-11-30 NOTE — PLAN OF CARE
Problem: Sleep Disturbance (Anxiety Signs/Symptoms)  Goal: Improved Sleep (Anxiety Signs/Symptoms)  Outcome: Improving     Problem: Behavioral Health Plan of Care  Goal: Adheres to Safety Considerations for Self and Others  Outcome: Improving  Intervention: Develop and Maintain Individualized Safety Plan  Recent Flowsheet Documentation  Taken 11/30/2021 0200 by Stephenie Amezquita, RN  Safety Measures: safety rounds completed   Pt spent a quiet night, denied anxiety and depression, denied SI / HI .  Pt denied pain / discomforts, med compliant, will continue to monitor.

## 2021-11-30 NOTE — PROGRESS NOTES
Progress Note    Patient Name: Ayana Prasad  Date: 11/24/2021       Service Type: Individual      Session Start Time: 2:06 Session End Time: 2:55     Session Length: 49 min    Session #: 17    Attendees: Client attended alone    Service Modality:  Video Visit:      Provider verified identity through the following two step process.  Patient provided:  Patient is known previously to provider    Telemedicine Visit: The patient's condition can be safely assessed and treated via synchronous audio and visual telemedicine encounter.      Reason for Telemedicine Visit: Patient has requested telehealth visit    Originating Site (Patient Location): Patient's home    lDistant Site (Provider Location): [facility]: [facility] and [place].    Consent:  The patient/guardian has verbally consented to: the potential risks and benefits of telemedicine (video visit) versus in person care; bill my insurance or make self-payment for services provided; and responsibility for payment of non-covered services.     Patient would like the video invitation sent by:  My Chart    Mode of Communication:  Video Conference via [employer]    As the provider I attest to compliance with applicable laws and regulations related to telemedicine.     Treatment Plan Last Reviewed:10/15/2021  PHQ-14:  Has not completed for review  AVA-14:     DATA  Interactive Complexity: No  Crisis: No       Progress Since Last Session (Related to Symptoms / Goals / Homework):   Symptoms: Worsening manic behavior. hallucination (hear's voices that are instructive).      Homework: Partially completed  Decided to continue treatment with this provider.     Episode of Care Goals: Minimal progress - PRECONTEMPLATION (Not seeing need for change); Intervened by educating the patient about the effects of current behavior on health.  Evoked information about reasons to continue behavior, express concern /  recommendations, and explored any change talk      Current / Ongoing Stressors and Concerns:Prakash Randall reports more stable mood.  He is taking medications as prescribed by his Psychiatrist.  Prakash reports ongoing drug use though he refused to disclose what.  He reports she continues to hear voices  others don't hear though he states they are not loud nor aggressive. Discussed consequences of continued drug use/abuse.   Prakash disclosed he uses drugs to cover/numb his deep sadness,     Treatment Objective(s) Addressed in This Session:    Use at least 2 coping skills for stress management in the next 2 weeks.    Reviewd medications.  Reviewed current symptoms.  Discussed best practice.  Discussed services.     Discussed readiness for change     Intervention:     Motivational Interviewing: client-centered; Explore and resolve ambivalence to elicite behavior change. Support  through exploration of motives to resolve of ambivalence to change (treatment for drug abuse)   Motivational interviewing to identify the thoughts and feelings that lead to continue anxiety and help her to develop new thought patterns to aid in symptom management/reduction.          ASSESSMENT: Current Emotional / Mental Status (status of significant symptoms):   Risk status (Self / Other harm or suicidal ideation)   Patient denies current fears or concerns for personal safety.   Patient denies current or recent suicidal ideation or behaviors.  Denied suicidal or homicidal ideations.10/15/2021   Patient denies current or recent homicidal ideation or behaviors.   Patient denies current or recent self injurious behavior or ideation.   Patient denies other safety concerns.   Patient reports there has been no change in risk factors since their last session.     Patient reports there has been no change in protective factors since their last session.     A safety and risk management plan has been developed including: Patient consented to  co-developed safety plan.  Safety and risk management plan was completed.  Patient agreed to use safety plan should any safety concerns arise.  A copy was given to the patient.     Appearance:   Approprate   Eye Contact:   Poor   Psychomotor Behavior: Normal  Restless    Attitude:   Guarded  Indifferent Evasive   Orientation:   All   Speech    Rate / Production: Normal/ Responsive Normal     Volume:  Normal    Mood:    Depressed  Apathetic Ambivalent   Affect:    Blunted    Thought Content:  Denied   Thought Form:  Coherent  Denied Psychosis   Insight:    Good  and Fair      Medication Review:   No changes to current psychiatric medication(s)     Medication Compliance:   Yes     Changes in Health Issues:   None reported     Chemical Use Review:   Substance Use: Chemical use reviewed, no active concerns identified   History of meth use.   Last use 6 months ago.  Will begin harm reduction treatment wit R.     Tobacco Use: No change in amount of tobacco use since last session.  Reviewed information and resources for quitting. Repeats every session. Vapping much of the day.     Diagnosis:  Schizoaffective disorder-Manic      Collateral Reports Completed:   No release needs identified this session.    PLAN: (Patient Tasks / Therapist Tasks / Other)   Discuss CD treatment with [facility] counselor.  Identify 2 goals that will increase activities with others and improve mood.  make life Report to the nearest ED if feeling unsafe.  Continue to engage in activities as able with friends. Continue to contact mother every day. Return in 2 weeks.    Joana Hagen, [facility]  11/24/2021                                                       ______________________________________________________________________    Treatment Plan    Patient's Name: Ayana Prasad  YOB: 1990    Date: 10/15/2021    DSM5 Diagnoses: 295.70  (F25.1) Schizoaffective Disorder Depressive Type  Psychosocial / Contextual Factors: in group home;  relationship issues.    WHODAS: 22    Referral / Collaboration:  No referral or collaboration needs identified this sessions.     Anticipated number of session or this episode of care: 24+      MeasurableTreatment Goal(s) related to diagnosis / functional impairment(s)  Goal 1:  Decrease or alleviate symptoms of depression by 2 points on the PHQ-9   I will know I've met my goal when I no longer have thoughts of suicide.    Objective #A Patient will Decrease frequency and intensity of feeling down, depressed, hopeless  Improve quantity and quality of night time sleep / decrease daytime naps  Identify negative self-talk and behaviors: challenge core beliefs, myths, and actions  Decrease thoughts that you'd be better off dead or of suicide / self-harm.   Patient will follow her safety plan; using skills to manage symptoms  Status: Reviewed  - Date: 10/15/2021     Intervention(s)  Therapist will provide client centered therapy and DBT: mindfulness and emotional regulation..   Patient will follow her safety plan; using skills to manage symptoms    Goal 2: Reduce/manage anxiety decreasing anxiety by 2 points on the AVA.    I will know I've met my goal when I can recognize and mange anxiety.      Objective #A Patient will identify at least 3 triggers for anxiety.    Status: Reviewed  - Date: 10/15/2021       Intervention(s)  Therapist will provide CBT: identify negative self defeating thoughts that exacerbate anxiousness and lead to suicidal ideations.      Patient has reviewed and agreed to the above plan.      Joana Hagen, [facility]  Reviewed   Date: 10/15/2021

## 2021-11-30 NOTE — SIGNIFICANT EVENT
"Pt was seen by MD this afternoon and verbalized feelings of \"anger\" after the visit. Pt punches fiberglass window of nursing office three times. Pt is redirectable by behavioral tech. Pt is assessed by writer and c/o pain 5/10 and sweeling to knuckles of right hand is assessed. Pt verbalizes anger r/t \"she took all my PRNs away\". Pt asks to sign neuroleptic consent form that he had refused to sign earlier with MD. A new form is signed and will update MD and request Seroquel at bedtime. Hospitalist visits pt  to assess hand. Ice pack applied to hand per MD orders. Behavior is calm. PT denies any HI at this time and contracts for safety on the unit. Staff will continue to monitor.  Amarilis June RN    Pt is approached with hs medications and does not wake up. Medications not given.    "

## 2021-12-01 ENCOUNTER — APPOINTMENT (OUTPATIENT)
Dept: RADIOLOGY | Facility: CLINIC | Age: 31
End: 2021-12-01
Attending: NURSE PRACTITIONER
Payer: COMMERCIAL

## 2021-12-01 LAB — GLUCOSE BLDC GLUCOMTR-MCNC: 157 MG/DL (ref 70–99)

## 2021-12-01 PROCEDURE — 73090 X-RAY EXAM OF FOREARM: CPT | Mod: RT

## 2021-12-01 PROCEDURE — 250N000013 HC RX MED GY IP 250 OP 250 PS 637: Performed by: NURSE PRACTITIONER

## 2021-12-01 PROCEDURE — 250N000013 HC RX MED GY IP 250 OP 250 PS 637: Performed by: PSYCHIATRY & NEUROLOGY

## 2021-12-01 PROCEDURE — 250N000011 HC RX IP 250 OP 636: Performed by: PSYCHIATRY & NEUROLOGY

## 2021-12-01 PROCEDURE — 128N000001 HC R&B CD/MH ADULT

## 2021-12-01 PROCEDURE — 99233 SBSQ HOSP IP/OBS HIGH 50: CPT | Performed by: PSYCHIATRY & NEUROLOGY

## 2021-12-01 PROCEDURE — 99231 SBSQ HOSP IP/OBS SF/LOW 25: CPT | Performed by: NURSE PRACTITIONER

## 2021-12-01 RX ORDER — DOXYCYCLINE 50 MG/1
100 CAPSULE ORAL EVERY 12 HOURS SCHEDULED
Status: COMPLETED | OUTPATIENT
Start: 2021-12-01 | End: 2021-12-06

## 2021-12-01 RX ORDER — ACETAMINOPHEN 325 MG/1
650 TABLET ORAL EVERY 4 HOURS PRN
Status: DISCONTINUED | OUTPATIENT
Start: 2021-12-02 | End: 2021-12-01

## 2021-12-01 RX ORDER — CAFFEINE 200 MG
100 TABLET ORAL EVERY 6 HOURS PRN
Status: DISCONTINUED | OUTPATIENT
Start: 2021-12-01 | End: 2021-12-09

## 2021-12-01 RX ORDER — AMLODIPINE BESYLATE 5 MG/1
5 TABLET ORAL ONCE
Status: COMPLETED | OUTPATIENT
Start: 2021-12-01 | End: 2021-12-01

## 2021-12-01 RX ORDER — ACETAMINOPHEN 500 MG
1000 TABLET ORAL ONCE
Status: COMPLETED | OUTPATIENT
Start: 2021-12-01 | End: 2021-12-01

## 2021-12-01 RX ORDER — LACTOBACILLUS RHAMNOSUS GG 10B CELL
1 CAPSULE ORAL 2 TIMES DAILY
Status: DISCONTINUED | OUTPATIENT
Start: 2021-12-01 | End: 2021-12-10 | Stop reason: HOSPADM

## 2021-12-01 RX ORDER — ZIPRASIDONE HYDROCHLORIDE 20 MG/1
20 CAPSULE ORAL 2 TIMES DAILY WITH MEALS
Status: DISCONTINUED | OUTPATIENT
Start: 2021-12-01 | End: 2021-12-06

## 2021-12-01 RX ORDER — AMLODIPINE BESYLATE 5 MG/1
10 TABLET ORAL DAILY
Status: DISCONTINUED | OUTPATIENT
Start: 2021-12-02 | End: 2021-12-10 | Stop reason: HOSPADM

## 2021-12-01 RX ORDER — CLONAZEPAM 1 MG/1
1 TABLET ORAL 2 TIMES DAILY PRN
Status: DISCONTINUED | OUTPATIENT
Start: 2021-12-01 | End: 2021-12-10 | Stop reason: HOSPADM

## 2021-12-01 RX ORDER — ACETAMINOPHEN 500 MG
1000 TABLET ORAL EVERY 6 HOURS PRN
Status: DISCONTINUED | OUTPATIENT
Start: 2021-12-01 | End: 2021-12-09

## 2021-12-01 RX ORDER — CAFFEINE 200 MG
100 TABLET ORAL ONCE
Status: COMPLETED | OUTPATIENT
Start: 2021-12-01 | End: 2021-12-01

## 2021-12-01 RX ORDER — MIRTAZAPINE 15 MG/1
15 TABLET, FILM COATED ORAL AT BEDTIME
Status: DISCONTINUED | OUTPATIENT
Start: 2021-12-01 | End: 2021-12-10 | Stop reason: HOSPADM

## 2021-12-01 RX ADMIN — TESTOSTERONE CYPIONATE 40 MG: 200 INJECTION INTRAMUSCULAR at 11:34

## 2021-12-01 RX ADMIN — LITHIUM CARBONATE 900 MG: 300 TABLET, FILM COATED, EXTENDED RELEASE ORAL at 20:06

## 2021-12-01 RX ADMIN — ZIPRASIDONE HCL 20 MG: 20 CAPSULE ORAL at 20:06

## 2021-12-01 RX ADMIN — PANTOPRAZOLE SODIUM 40 MG: 40 TABLET, DELAYED RELEASE ORAL at 08:06

## 2021-12-01 RX ADMIN — CLINDAMYCIN PHOSPHATE: 10 GEL TOPICAL at 20:06

## 2021-12-01 RX ADMIN — QUETIAPINE FUMARATE 50 MG: 50 TABLET ORAL at 11:34

## 2021-12-01 RX ADMIN — FLUPHENAZINE DECANOATE 25 MG: 25 INJECTION, SOLUTION INTRAMUSCULAR; SUBCUTANEOUS at 11:34

## 2021-12-01 RX ADMIN — LITHIUM CARBONATE 300 MG: 300 TABLET, FILM COATED, EXTENDED RELEASE ORAL at 08:06

## 2021-12-01 RX ADMIN — NICOTINE POLACRILEX 2 MG: 2 GUM, CHEWING BUCCAL at 11:48

## 2021-12-01 RX ADMIN — CLINDAMYCIN PHOSPHATE: 10 GEL TOPICAL at 08:06

## 2021-12-01 RX ADMIN — ACETAMINOPHEN 1000 MG: 500 TABLET ORAL at 15:03

## 2021-12-01 RX ADMIN — ACETAMINOPHEN 975 MG: 325 TABLET ORAL at 08:06

## 2021-12-01 RX ADMIN — NICOTINE POLACRILEX 2 MG: 2 GUM, CHEWING BUCCAL at 17:36

## 2021-12-01 RX ADMIN — NICOTINE POLACRILEX 2 MG: 2 GUM, CHEWING BUCCAL at 13:08

## 2021-12-01 RX ADMIN — ESCITALOPRAM OXALATE 10 MG: 10 TABLET ORAL at 08:10

## 2021-12-01 RX ADMIN — DOXYCYCLINE 100 MG: 50 CAPSULE ORAL at 20:05

## 2021-12-01 RX ADMIN — Medication 1 CAPSULE: at 15:03

## 2021-12-01 RX ADMIN — CLONAZEPAM 1 MG: 1 TABLET ORAL at 20:11

## 2021-12-01 RX ADMIN — Medication 1 CAPSULE: at 20:06

## 2021-12-01 RX ADMIN — AMLODIPINE BESYLATE 5 MG: 5 TABLET ORAL at 08:06

## 2021-12-01 RX ADMIN — GABAPENTIN 1200 MG: 400 CAPSULE ORAL at 13:09

## 2021-12-01 RX ADMIN — GABAPENTIN 900 MG: 300 CAPSULE ORAL at 08:05

## 2021-12-01 RX ADMIN — CAFFEINE 100 MG: 200 TABLET ORAL at 15:04

## 2021-12-01 RX ADMIN — GABAPENTIN 900 MG: 300 CAPSULE ORAL at 20:05

## 2021-12-01 RX ADMIN — AMLODIPINE BESYLATE 5 MG: 5 TABLET ORAL at 11:44

## 2021-12-01 RX ADMIN — HYDROXYZINE HYDROCHLORIDE 25 MG: 25 TABLET, FILM COATED ORAL at 16:27

## 2021-12-01 RX ADMIN — NICOTINE POLACRILEX 2 MG: 2 GUM, CHEWING BUCCAL at 20:11

## 2021-12-01 RX ADMIN — NICOTINE POLACRILEX 2 MG: 2 GUM, CHEWING BUCCAL at 09:29

## 2021-12-01 RX ADMIN — Medication 1 PATCH: at 08:05

## 2021-12-01 RX ADMIN — ACETAMINOPHEN 1000 MG: 500 TABLET ORAL at 21:24

## 2021-12-01 RX ADMIN — CAFFEINE 100 MG: 200 TABLET ORAL at 21:24

## 2021-12-01 RX ADMIN — MIRTAZAPINE 15 MG: 15 TABLET, FILM COATED ORAL at 20:06

## 2021-12-01 ASSESSMENT — ACTIVITIES OF DAILY LIVING (ADL)
ORAL_HYGIENE: INDEPENDENT
HYGIENE/GROOMING: INDEPENDENT
LAUNDRY: WITH SUPERVISION
DRESS: INDEPENDENT
HYGIENE/GROOMING: INDEPENDENT
ORAL_HYGIENE: INDEPENDENT
DRESS: INDEPENDENT

## 2021-12-01 NOTE — SIGNIFICANT EVENT
Pt stabbed his right forearm with a pencil on an old scabbed wound .Spot bloody, superficial wound cleaned and dressing applied.Pt placed on 1:1, sitter for safety as pt is at risk for injuring himself. Pt's room searched and made safe.

## 2021-12-01 NOTE — PROGRESS NOTES
Pt continue on 1:1, for safety due to SIB, pt took all his scheduled medications,ate 100% of bedtime snack, no further self injurious behavior noted. Staff will continue to monitor.

## 2021-12-01 NOTE — PROGRESS NOTES
"[facility]    Medicine Progress Note - Hospitalist Service       Date of Admission:  11/27/2021  31 year old transgender female to male admitted inpatient psychiatry unit for auditory hallucinations and anxiety.    Assessment & Plan           # Headache, Occipital:    # Dizziness:  Headache onset this morning. Rates 5/10. Back of head.   Dizziness onset 1 hour ago. Denies chest pain or shortness of breath.  Ate 100% lunch prior to my arrival.    Vital signs stable with /104. No acute neuro deficit. Alert. No acute vision changes. Treat elevated blood pressure, anxiety.  Trial Excedrin migraine x 1 for possible tension headache, maybe caffeine withdrawal. Drinks at least 300 grams caffeine daily with energy drink.   Fingerstick blood glucose 157  Encourage oral hydration  Imaging if headache worse, no relief from Excedrin or acute neuro changes.   Addendum: per pharmacy, Excedrin we carry has aspirin. Not able to give aspirin/nsaids due to lithium therapy. Lets give one time dose caffeine with acetaminophen 1 gram and see if she responds with improvement of headache.     # Elevated Blood Pressure without official hypertension diagnosis:  Dating back May 2021 per external facility problem list. No on BP medication outpatient.  Elevated blood pressures . On Amlodipine 5 mg.  Increase Amlodipine by 5 mg. Give additional dose today.  Continue to monitor blood pressures.    # Self Inflicted Injury Right Inner Forearm:   # Puncture wound:  Last night \" twisted pencil in healing scab area right inner forearm. Unsure if she broke off pencil in skin. Plain film assess for foreign body  Patient reports history c-diff with keflex  Vomiting with bactrim  Mild surrounding erythema, afebrile. treat with Doxycycline 100 mg po BID x 5 days  Oral probiotics BID x 14 days.   Watch for new onset loose stool.     # Auditory hallucinations:  # Anxiety:  - Management per primary team. "               Diet: Regular Diet Adult    DVT Prophylaxis: Low Risk/Ambulatory with no VTE prophylaxis indicated  Styles Catheter: Not present  Central Lines: None  Code Status: Full Code      Disposition Plan   Per Psychiatry        The patient's care was discussed with the Bedside Nurse and Patient.    BROOKLYN Salgado CNP  Hospitalist Service  [facility]  Securely message with the Vocera Web Console (learn more here)  Text page via [employer] Paging/Directory        Clinically Significant Risk Factors Present on Admission                ______________________________________________________________________    Interval History     See A & P    Data reviewed today: I reviewed all medications, new labs over the last 24 hours.       Physical Exam   Vital Signs: Temp: 97.5  F (36.4  C) Temp src: Oral BP: (!) 151/104 Pulse: 86   Resp: 18 SpO2: 98 % O2 Device: None (Room air)    Weight: 228 lbs 0 oz  PHYSICAL EXAMINATION FINDINGS: Alert, not in acute distress, Engages in conversation, Respirations unlabored, No rales at posterior lung bases, Pulse rate regular, Abdomen soft and non-tender, No edema of legs or feet  Skin: right inner forearm: dime size scabbed area with black dot center of wound, there is mild surrounding erythema around wound.   Distal pulses present. ROM intact.  No obvious swelling or deformity. Right handed.

## 2021-12-01 NOTE — PLAN OF CARE
Assessment/Intervention, Care Coordination and Current Symptoms:   CTC met with patient to update him on the status of the revocation that was filed by his R . Patient took the news well. He showed no visible irritation or aggression. He was very matter of fact and simple shrugged his shoulders in response. Patient requested that CTC help seek new group housing. He requested that CTC contact [facility] and inquire about availability. CTC left message asking for a return call with [facility].     CTC will continue to follow the patient and identify his needs, then work with patient and  to develop a safe discharge plan that addresses patient's needs.       Discharge Plan or Goal:  TBD pending filing for revocation and consultation with  and treatment team.         Barriers to Discharge:  Hold, symptom stabilization, medication management, coordination of care     Referral Status:  None at thie time.     Legal Status:  Commitment (R  filing revocation paperwork 11/30/21),     Dom Carnes MA, [facility], 12/1/2021, 2:23 PM

## 2021-12-01 NOTE — PLAN OF CARE
"  Problem: Suicidal Behavior  Goal: Suicidal Behavior is Absent or Managed  Outcome: Improving     Problem: Mood Impairment (Anxiety Signs/Symptoms)  Goal: Improved Mood Symptoms (Anxiety Signs/Symptoms)  Outcome: Improving     Problem: Sleep Disturbance (Anxiety Signs/Symptoms)  Goal: Improved Sleep (Anxiety Signs/Symptoms)  Outcome: Improving    Patient is visible on unit, engaged with staff. He rated anxiety 4/10 and depression 8/10. He endorses auditory  Hallucination, stated \" the voice is telling me to kill my self.\" He endorses SI with a plan to kill himself but didn't want to share specific plan. Pt complaint of headache this am, rated at 3. BP was 161/106, P 84. Schedule tylenol and norvasc given. Headache did not get better, Bp was down to 141/98. Text paged Hospitalist regarding high /104. New order for extra 5mg of norvasc and was given. Pt was feeling light headedness and dizziness. He reported not feeling good at 1253, VS: T 98.0, p 98, R 22,  Bp 153/99. Hospitalist saw pt and see new orders. . Pt received seroquel for anxiety with some relief. Puncture site on right forearm is slightly red and painful. Site was clean and open to air. Informed Hospitalist. He is started Doxycycline. Explained meds, care plan and treatment to patient. Will continue to monitor.     "

## 2021-12-01 NOTE — PLAN OF CARE
"  Problem: Suicidal Behavior  Goal: Suicidal Behavior is Absent or Managed  Outcome: Improving     Problem: Mood Impairment (Anxiety Signs/Symptoms)  Goal: Improved Mood Symptoms (Anxiety Signs/Symptoms)  Outcome: Improving     Problem: Behavioral Health Plan of Care  Goal: Plan of Care Review  Outcome: Improving  Flowsheets (Taken 12/1/2021 1640)  Plan of Care Reviewed With: patient  Patient Agreement with Plan of Care: agrees     Patient endorsed anxiety rated 8/10 and depression 5/10.  Endorsed AH, commanding him to commit suicide.  Denied visual hallucinations.  Endorsed SI with no plan.  Received hydroxyzine 25 mg with reported relief.  Denied SIB, stating that he plans to follow the safety care plan to enable him to access art supplies.  Patient is visible in the unit, minimally engaged in unit activities.  Pt is quiet, blunted affect, calm and controlled.  Pt continues to be on a 1:1 safety monitoring.  C/o headache rated 7/10, received prn Excedrin.   BP (!) 151/104   Pulse 86   Temp 97.5  F (36.4  C) (Oral)   Resp 18   Ht 1.651 m (5' 5\")   Wt 103.4 kg (228 lb)   SpO2 98%   BMI 37.94 kg/m      "

## 2021-12-01 NOTE — PROGRESS NOTES
12/01/21 1116   Engagement   Intervention Group   Topic Detail OT [employer] group-fimo mackenzie animals for social engagement, creativity, healthy distraction, symptom management, focus and direction following   Attendance Attended   Patient Response Accepted feedback;Asked questions and/or took notes   Concentrated on Task duration of group   Mood/Affect Pleasant   Social/Behavioral Cooperative   Goals addressed in session today pt declined to engage in fimo activity. pt remained in lounge and engaged in conversation with staff and peers

## 2021-12-01 NOTE — PLAN OF CARE
Problem: Sleep Disturbance  Goal: Adequate Sleep/Rest  Outcome: Improving     Problem: Behavioral Health Plan of Care  Goal: Adheres to Safety Considerations for Self and Others  Outcome: Improving  Intervention: Develop and Maintain Individualized Safety Plan  Recent Flowsheet Documentation  Taken 12/1/2021 0000 by Stephenie Amezquita, RN  Safety Measures: safety rounds completed   Pt is on suicidal precaution, no suicide noted during the night. Pt slept >  6 hours, denied pains / discomforts, resp wnl. No behavior problems, remained on 1:1 through the night, will continue to monitor.

## 2021-12-01 NOTE — SIGNIFICANT EVENT
Paged by RN requesting PRN anti-hypertensive medication for /106. Patient did receive morning dose Amlodipine 5 mg this morning. Recheck /98.     Plan: Amlodipine 5 mg po x 1. Hold for SBP less than 140    Tomorrow start Amlodipine 10 mg po daily.    Per electronic chart review, external facility problem list, history of elevated blood pressure without official hypertension diagnosis dating back May 2021.  Agree could also be situational given current inpatient setting.      BROOKLYN Salgado, CNP  Hospital Medicine Service  [facility]

## 2021-12-02 PROBLEM — F25.9 SCHIZOPHRENIA, SCHIZOAFFECTIVE, CHRONIC WITH ACUTE EXACERBATION (H): Status: ACTIVE | Noted: 2021-11-29

## 2021-12-02 PROBLEM — F25.9 SCHIZOAFFECTIVE DISORDER, CHRONIC CONDITION WITH ACUTE EXACERBATION (H): Status: ACTIVE | Noted: 2021-11-27

## 2021-12-02 PROCEDURE — 250N000013 HC RX MED GY IP 250 OP 250 PS 637: Performed by: NURSE PRACTITIONER

## 2021-12-02 PROCEDURE — 99231 SBSQ HOSP IP/OBS SF/LOW 25: CPT | Performed by: NURSE PRACTITIONER

## 2021-12-02 PROCEDURE — 250N000013 HC RX MED GY IP 250 OP 250 PS 637: Performed by: PSYCHIATRY & NEUROLOGY

## 2021-12-02 PROCEDURE — 250N000011 HC RX IP 250 OP 636: Performed by: PSYCHIATRY & NEUROLOGY

## 2021-12-02 PROCEDURE — 128N000001 HC R&B CD/MH ADULT

## 2021-12-02 PROCEDURE — 99233 SBSQ HOSP IP/OBS HIGH 50: CPT | Performed by: PSYCHIATRY & NEUROLOGY

## 2021-12-02 RX ORDER — HYDRALAZINE HYDROCHLORIDE 10 MG/1
10 TABLET, FILM COATED ORAL EVERY 6 HOURS PRN
Status: DISCONTINUED | OUTPATIENT
Start: 2021-12-02 | End: 2021-12-10 | Stop reason: HOSPADM

## 2021-12-02 RX ADMIN — Medication 1 CAPSULE: at 08:03

## 2021-12-02 RX ADMIN — CLONAZEPAM 1 MG: 1 TABLET ORAL at 12:57

## 2021-12-02 RX ADMIN — HYDROXYZINE HYDROCHLORIDE 25 MG: 25 TABLET, FILM COATED ORAL at 17:08

## 2021-12-02 RX ADMIN — NICOTINE POLACRILEX 2 MG: 2 GUM, CHEWING BUCCAL at 12:37

## 2021-12-02 RX ADMIN — NICOTINE POLACRILEX 2 MG: 2 GUM, CHEWING BUCCAL at 07:37

## 2021-12-02 RX ADMIN — DOXYCYCLINE 100 MG: 50 CAPSULE ORAL at 08:08

## 2021-12-02 RX ADMIN — Medication 1 CAPSULE: at 19:36

## 2021-12-02 RX ADMIN — OLANZAPINE 10 MG: 10 INJECTION, POWDER, FOR SOLUTION INTRAMUSCULAR at 20:28

## 2021-12-02 RX ADMIN — DOXYCYCLINE 100 MG: 50 CAPSULE ORAL at 20:27

## 2021-12-02 RX ADMIN — ESCITALOPRAM OXALATE 10 MG: 10 TABLET ORAL at 08:03

## 2021-12-02 RX ADMIN — LITHIUM CARBONATE 900 MG: 300 TABLET, FILM COATED, EXTENDED RELEASE ORAL at 20:26

## 2021-12-02 RX ADMIN — HYDROXYZINE HYDROCHLORIDE 25 MG: 25 TABLET, FILM COATED ORAL at 06:04

## 2021-12-02 RX ADMIN — ZIPRASIDONE HCL 20 MG: 20 CAPSULE ORAL at 08:04

## 2021-12-02 RX ADMIN — AMLODIPINE BESYLATE 10 MG: 10 TABLET ORAL at 08:03

## 2021-12-02 RX ADMIN — LITHIUM CARBONATE 300 MG: 300 TABLET, FILM COATED, EXTENDED RELEASE ORAL at 08:03

## 2021-12-02 RX ADMIN — NICOTINE POLACRILEX 2 MG: 2 GUM, CHEWING BUCCAL at 14:51

## 2021-12-02 RX ADMIN — CLONAZEPAM 1 MG: 1 TABLET ORAL at 17:06

## 2021-12-02 RX ADMIN — NICOTINE POLACRILEX 2 MG: 2 GUM, CHEWING BUCCAL at 09:33

## 2021-12-02 RX ADMIN — ACETAMINOPHEN 1000 MG: 500 TABLET ORAL at 11:43

## 2021-12-02 RX ADMIN — CAFFEINE 100 MG: 200 TABLET ORAL at 14:51

## 2021-12-02 RX ADMIN — HYDROXYZINE HYDROCHLORIDE 25 MG: 25 TABLET, FILM COATED ORAL at 11:43

## 2021-12-02 RX ADMIN — Medication 1 PATCH: at 08:04

## 2021-12-02 RX ADMIN — NICOTINE POLACRILEX 2 MG: 2 GUM, CHEWING BUCCAL at 04:54

## 2021-12-02 RX ADMIN — GABAPENTIN 1200 MG: 400 CAPSULE ORAL at 13:00

## 2021-12-02 RX ADMIN — GABAPENTIN 900 MG: 300 CAPSULE ORAL at 20:27

## 2021-12-02 RX ADMIN — GABAPENTIN 900 MG: 300 CAPSULE ORAL at 08:03

## 2021-12-02 RX ADMIN — CLINDAMYCIN PHOSPHATE: 10 GEL TOPICAL at 20:27

## 2021-12-02 RX ADMIN — CLINDAMYCIN PHOSPHATE: 10 GEL TOPICAL at 08:05

## 2021-12-02 RX ADMIN — PANTOPRAZOLE SODIUM 40 MG: 40 TABLET, DELAYED RELEASE ORAL at 08:04

## 2021-12-02 RX ADMIN — ZIPRASIDONE HCL 20 MG: 20 CAPSULE ORAL at 17:09

## 2021-12-02 RX ADMIN — MIRTAZAPINE 15 MG: 15 TABLET, FILM COATED ORAL at 20:26

## 2021-12-02 ASSESSMENT — ACTIVITIES OF DAILY LIVING (ADL)
DRESS: INDEPENDENT
ORAL_HYGIENE: INDEPENDENT
LAUNDRY: WITH SUPERVISION
HYGIENE/GROOMING: INDEPENDENT
ORAL_HYGIENE: INDEPENDENT
DRESS: INDEPENDENT
HYGIENE/GROOMING: INDEPENDENT

## 2021-12-02 NOTE — CONSULTS
Completed 12/01/21      BROOKLYN Salgado, CNP  Hospital Medicine Service  [facility]
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
## 2021-12-02 NOTE — PLAN OF CARE
"Assessment/Intervention, Care Coordination and Current Symptoms:   [facility] met with patient for daily check-in. Patient requested that [facility] schedule a conference call with his , Vero Arreaga, tomorrow afternoon. Writer contacted [facility] and she agreed to participate in the call. During the check-in, patient was calm and cooperative. He reported that he has been speaking with another resident of his group home and was informed that \"if I don't move back to the house within 30 days, they are going to pack my belongings and move me out.\" [facility] called the group home in an attempt to verify this report. Call was not returned prior to posting of this note.     [facility] call [facility] per patient request to inquire about bed/room availability.  Message was left. Call not returned prior to posting of note.        Discharge Plan or Goal:  TBD pending filing for revocation and consultation with  and treatment team.      Barriers to Discharge:  symptom stabilization, medication management, coordination of care     Referral Status:  Hold, symptom stabilization, medication management, coordination of care     Legal Status:  Commitment (R  filing revocation paperwork 11/30/21),     Dom Carnes MA, [facility], 12/2/2021, 3:04 PM        "

## 2021-12-02 NOTE — PLAN OF CARE
BEHAVIORAL TEAM DISCUSSION    Participants: RN: Luz WHITMAN CTC: Dom BARNEY Provider: Gillian MCDONNELL MD, OT: Shona MUJICA Psychology: Michelle MUJICA PsyD, Pharmacy: [facility]   Progress: Improving  Anticipated length of stay: 1-2 weeks  Continued Stay Criteria/Rationale: symptom stabilization, medication management, coordination of care  Medical/Physical: None reported  Precautions: Aggression   Behavioral Orders   Procedures    Assault precautions    Code 1 - Restrict to Unit    Routine Programming     As clinically indicated    Self Injury Precaution    Status 15     Every 15 minutes.    Status Individual Observation     Pt stabbed self with pencil on right forearm. Pt will not contract for safety.     Order Specific Question:   CONTINUOUS 24 hours / day     Answer:   1 foot     Order Specific Question:   Indications for SIO     Answer:   Self-injury risk     Plan: Return to group home or new supported living home  Rationale for change in precautions or plan: Patient requested new living arrangements

## 2021-12-02 NOTE — PROGRESS NOTES
"   12/02/21 1046   Engagement   Intervention Group   Topic Detail OT: Wellness (Exercise Jenga) to promote physical wellness, pain management, healthy leisure, and coping strategies to manage mental health symptoms.   Attendance Attended   Patient Response Needs reinforcement/repetition to learn materials;Accepted feedback   Concentrated on Task duration of group   Cognition Poor organization   Mood/Affect Content   Thought Content [place]   Goals addressed in session today Pt progressing toward goal by checking in and ID'ing tennis as preferred activity for managing mental health symptoms. Pt declining to take most turns during gp, however with encouragement from staff and peers opting to take turn and knocking over thredUP game on purpose stating, \"sorry I'm impulsive.\" Pt redirectable and attempting to complete turn a different time appropriately sequencing and not intentionally knocking over gp game. Positive reinforcement provided folloiwng appropriate gp interaction. Pt requested sharpies to complete previous gp project. Sharpies supplied to pt's 1:1 for pt to engage in hands on activity for symptom management. Sharpies to be monitored by 1:1 at all times.     "
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
## 2021-12-02 NOTE — PLAN OF CARE
Problem: Suicidal Behavior  Goal: Suicidal Behavior is Absent or Managed  Outcome: Improving     Problem: Activity and Energy Impairment (Anxiety Signs/Symptoms)  Goal: Optimized Energy Level (Anxiety Signs/Symptoms)  Outcome: Improving  Intervention: Optimize Energy Level  Recent Flowsheet Documentation  Taken 12/2/2021 0900 by Uriel Dumont, RN  Diversional Activity: play    Patient was anxious, rated anxiety 4 depression 6 this am. He endorsed SI with plan to harm himself. He endorsed auditory hallucination- the voice was calling him bad name. He denied HI. Pt's bp was 151/107, p 78. meds were given. Rechecked bp for 118/82. Pt complaint of increased anxiety, rated at 8/10,  Atarax  Given with no relief, klonopin 1 mg given with some relief. Anxiety was down to 5/10. Pt complaint of headache, rated at 5/10. Tylenol was given. Pain was down to 2/10. Patient spoke to his mom on the phone at 1335. He got frustrated, he cut the top of his right with the  broken plastic board on the wall. He stated his mom refused to talk to him. Site was cleaned and bandage applied. Text message sent to DR Andrade. Explained meds, care plan and treatment to patient.

## 2021-12-02 NOTE — PROGRESS NOTES
PSYCHIATRY PROGRESS NOTE         DATE OF SERVICE:   12/1/2021         CHIEF COMPLAINT:     Auditory hallucinations telling him to kill himself, anxiety, medication adjustment          OBJECTIVE:     Nursing reports : Takes medications, reports auditory hallucinations telling him to kill himself     reports working on outpatient referrals    Hospitalist consult by Star Mora APRN CNP on 12/1/2021  Elevated Blood Pressure without official hypertension diagnosis:  Dating back May 2021 per external facility problem list. No on BP medication outpatient.  Elevated blood pressures . On Amlodipine 5 mg.  Increase Amlodipine by 5 mg. Give additional dose today.  Continue to monitor blood pressures  For headache coffeine  with acetaminophen 1 g         SUBJECTIVE:      Prakash  says that he continues to have auditory hallucinations telling him to kill himself.  He had Prolixin decanoate injection today.  He says that he does not get much benefit from Prolixin.  He says that Geodon helped with hallucinations in the past.  I discussed multiple antipsychotics.  He is on Prolixin and Seroquel.  Seroquel would have to be discontinued and replaced with Geodon.  He says that Seroquel helps with sleep.  We discussed starting Remeron for sleep.  He is paranoid.  He has suicidal thoughts.  No thoughts of hurting others.  He says he is anxious.  He says that the medications that were found in his room were not other persons medications.  He says that is where his medications which he picked up in the pharmacy.  He says it was Seroquel that he had in his room, not addictive substances.  I explained to him that regardless of that, if the rule is that all the medications have to be handed to the staff then he needs to follow that through.  He asks if Klonopin can be ordered in the hospital, he says he does not need to be ordered on the outpatient basis, but it could help with anxiety.  I agreed to  "prescribe it in the hospital.  He says that Stelazine helped in the past.  In 2019 he was on 2 mg 3 times a day.  We discussed again risk of QT QTC prolongation on multiple neuroleptics.  He had EKG yesterday.  QT QTC was 390/438.  Last night he twisted pencil in a healing scab in the right inner forearm.         MEDICATIONS:       - Psychiatric Emergency -   Does not apply See Admin Instructions     [START ON 12/2/2021] amLODIPine  10 mg Oral Daily     clindamycin   Topical BID     doxycycline monohydrate  100 mg Oral Q12H RADHA     escitalopram  10 mg Oral Daily     fluPHENAZine decanoate  25 mg Intramuscular Q14 Days     gabapentin  1,200 mg Oral Daily     gabapentin  900 mg Oral BID     lactobacillus rhamnosus (GG)  1 capsule Oral BID     lithium ER  300 mg Oral QAM     lithium ER  900 mg Oral At Bedtime     mirtazapine  15 mg Oral At Bedtime     nicotine  1 patch Transdermal Daily     nicotine   Transdermal Q8H     pantoprazole  40 mg Oral Daily     testosterone cypionate  0.2 mL Subcutaneous Q7 Days     ziprasidone  20 mg Oral BID w/meals     [START ON 12/2/2021] acetaminophen, alum & mag hydroxide-simethicone, clonazePAM, hydrOXYzine, nicotine, OLANZapine **OR** OLANZapine, ondansetron, polyethylene glycol    Medication adherence: Yes  Medication side effects: No  Benefit: Symptom reduction         ROS:   As per history of present illness, otherwise reminder of review of systems is negative for: General, eyes, ears, nose, throat, neck, respiratory, cardiovascular, gastrointestinal, genitourinary, meniscal skeletal, neurological, hematological, dermatological and endocrine system.         MENTAL STATUS EXAM:   BP (!) 151/104   Pulse 86   Temp 97.5  F (36.4  C) (Oral)   Resp 18   Ht 1.651 m (5' 5\")   Wt 103.4 kg (228 lb)   SpO2 98%   BMI 37.94 kg/m      Appearance:fair hygiene, cooperative  Orientation: Alert and oriented x3  Speech: Normal in rate and tone  Language ability: Normal syntax and " vocabulary  Thought process: concrete  Thought content: Positive for auditory hallucinations telling him to kill himself  Associations: Connected  Suicidal Ideation: Present  Homicidal Ideation: Denies  Mood: Depressed  Affect: Anxious and irritable l  Intellectual functioning:average  Fund of Knowledge: Consistent with education and experience  Attention/Concentration: decreased  Memory: intact  Psychomotor Behavior:  Mild agitation  Muscle Strength and Tone: no atrophy or involuntary movement  Gait and Station: steady  Insight and judgement: Impaired, going through revocation of provisional discharge, on extended commitment since 2019          LABS:   personally reviewed.     11/26/2021  COVID-19 test negative  Lithium level 0.9  TSH 4.95  Free T4 0.87  Pregnancy test negative  Creatinine 0.78    Lab Results   Component Value Date     11/26/2021     10/06/2021     05/19/2021     01/10/2021     12/17/2020    CO2 26 11/26/2021    CO2 21 10/06/2021    CO2 19 05/19/2021    CO2 21 01/10/2021    CO2 19 12/17/2020    BUN 16 11/26/2021    BUN 12 10/06/2021    BUN 11 05/19/2021    BUN 13 01/10/2021    BUN 11 12/17/2020     No results found for: CKTOTAL, CKMB, TROPONINI  Lab Results   Component Value Date    WBC 12.6 11/26/2021    WBC 12.4 10/06/2021    WBC 13.1 05/19/2021    WBC 12.0 01/10/2021    WBC 12.4 12/15/2020    HGB 14.0 11/26/2021    HGB 13.1 10/06/2021    HGB 13.6 05/19/2021    HGB 12.2 03/01/2021    HGB 13.0 01/10/2021    HCT 42.6 11/26/2021    HCT 40.8 10/06/2021    HCT 41.6 05/19/2021    HCT 39.8 01/10/2021    HCT 38.9 12/15/2020    MCV 81 11/26/2021    MCV 82 10/06/2021    MCV 84 05/19/2021    MCV 83 01/10/2021    MCV 85 12/15/2020     11/26/2021     10/06/2021     05/19/2021     01/10/2021     12/18/2020     Lab Results   Component Value Date    CHOL 181 04/28/2021    CHOL 188 03/01/2021    CHOL 230 10/23/2019    TRIG 317 04/28/2021    TRIG 358  03/01/2021    TRIG 211 10/23/2019    HDL 37 04/28/2021    HDL 66 03/01/2021    HDL 58 10/23/2019     ECG on 11/30/2021  Systolic Blood Pressure mmHg       Diastolic Blood Pressure mmHg      Ventricular Rate BPM 76      Atrial Rate BPM 76      FL Interval ms 158      QRS Duration ms 98      QT ms 390      QTc ms 438      P Axis degrees 31      R AXIS degrees 25      T Axis degrees 27      Interpretation ECG  Sinus rhythm   Normal ECG        Ref. Range 11/26/2021 23:03 11/27/2021 17:27 12/1/2021 13:41   Sodium Latest Ref Range: 133 - 144 mmol/L 137     Potassium Latest Ref Range: 3.4 - 5.3 mmol/L 3.6     Chloride Latest Ref Range: 94 - 109 mmol/L 105     Carbon Dioxide Latest Ref Range: 20 - 32 mmol/L 26     Urea Nitrogen Latest Ref Range: 7 - 30 mg/dL 16     Creatinine Latest Ref Range: 0.52 - 1.25 mg/dL 0.78     GFR Estimate Latest Ref Range: >60 mL/min/1.73m2 >90     Calcium Latest Ref Range: 8.5 - 10.1 mg/dL 10.1     Anion Gap Latest Ref Range: 3 - 14 mmol/L 6     HCG Qual Urine Latest Ref Range: Negative   Negative    T4 Free Latest Units: ng/dL 0.87     TSH Latest Ref Range: 0.40 - 4.00 mU/L 4.95 (H)     Glucose Latest Ref Range: 70 - 99 mg/dL 93     GLUCOSE BY METER POCT Latest Ref Range: 70 - 99 mg/dL   157 (H)   WBC Latest Ref Range: 4.0 - 11.0 10e3/uL 12.6 (H)     Hemoglobin Latest Ref Range: 11.7 - 17.7 g/dL 14.0     Hematocrit Latest Ref Range: 35.0 - 53.0 % 42.6     Platelet Count Latest Ref Range: 150 - 450 10e3/uL 358     RBC Count Latest Ref Range: 3.80 - 5.90 10e6/uL 5.27     MCV Latest Ref Range: 78 - 100 fL 81     MCH Latest Ref Range: 26.5 - 33.0 pg 26.6     MCHC Latest Ref Range: 31.5 - 36.5 g/dL 32.9     RDW Latest Ref Range: 10.0 - 15.0 % 14.9     % Neutrophils Latest Units: % 67     % Lymphocytes Latest Units: % 25     % Monocytes Latest Units: % 5     % Eosinophils Latest Units: % 2     % Basophils Latest Units: % 1     Absolute Basophils Latest Ref Range: 0.0 - 0.2 10e3/uL 0.1     Absolute  Eosinophils Latest Ref Range: 0.0 - 0.7 10e3/uL 0.2     Absolute Immature Granulocytes Latest Ref Range: <=0.0 10e3/uL 0.0     Absolute Lymphocytes Latest Ref Range: 0.8 - 5.3 10e3/uL 3.2     Absolute Monocytes Latest Ref Range: 0.0 - 1.3 10e3/uL 0.7     % Immature Granulocytes Latest Units: % 0     Absolute Neutrophils Latest Ref Range: 1.6 - 8.3 10e3/uL 8.4 (H)     Absolute NRBCs Latest Units: 10e3/uL 0.0     NRBCs per 100 WBC Latest Ref Range: <1 /100 0     SARS CoV2 PCR Latest Ref Range: Negative  Negative     Lithium Level Latest Ref Range:   mmol/L 0.9       Recent Results (from the past 24 hour(s))   Glucose by meter    Collection Time: 12/01/21  1:41 PM   Result Value Ref Range    GLUCOSE BY METER POCT 157 (H) 70 - 99 mg/dL            DIAGNOSIS:     Schizoaffective disorder chronic with acute exacerbation  Generalized anxiety disorder  Posttraumatic stress disorder  ADHD combined type  Borderline personality disorder  Marijuana dependency    Patient Active Problem List   Diagnosis     ADHD (attention deficit hyperactivity disorder)     Bipolar 1 disorder, manic, mild     Marijuana abuse     Polysubstance abuse (H)     GERD (gastroesophageal reflux disease)     Tobacco abuse     Abdominal pain, right upper quadrant     Intractable back pain     Optic neuritis     Cauda equina syndrome with neurogenic bladder (H)     Schizoaffective disorder, bipolar type (H)     PTSD (post-traumatic stress disorder)     Anxiety     Auditory hallucination     Class 2 obesity due to excess calories in adult     Nephrolithiasis     Cyst of left ovary     Borderline personality disorder (H)     Cannabis dependence (H)     Depression     Episodic mood disorder (H)     History of heroin abuse (H)     Moderate episode of recurrent major depressive disorder (H)     Opioid use disorder, severe, dependence (H)     Substance-induced psychotic disorder with hallucinations (H)     Nausea     Overdose     Suicidal ideation     Bella (H)      Spell of altered consciousness     Urinary retention     Obesity (BMI 35.0-39.9) with comorbidity (H)     Chronic bilateral low back pain without sciatica     AVA (generalized anxiety disorder)     Aggressive behavior     Gender identity disorder     Lumbosacral radiculopathy at L5     DUB (dysfunctional uterine bleeding)     Seizure-like activity (H)     Elevated blood pressure reading without diagnosis of hypertension     Acanthosis nigricans     Viral gastroenteritis     Prediabetes     Psychosis (H)     Bipolar affective disorder, mixed, severe, with psychotic behavior (H)          PLAN:   Patient continues to have auditory hallucinations telling him  to kill himself.  He is going through revocation of provisional discharge.  He has been under extended commitment since 2019.  We discussed diagnosis and treatment plan and patient agrees with the following recommendations:    Medications:  Discontinue Seroquel  Start Geodon 20 mg twice daily with food for psychosis  Start Remeron 15 mg nightly for sleep  Start Klonopin 1 mg twice daily as needed for anxiety  Prolixin Decanoate 25 mg IM every 14 days, given on 12/1/2021  Lithobid 300 mg every morning and 900 mg nightly  Neurontin 900 mg twice daily and 1200 mg midday  Lexapro 10 mg daily  Nicotine patch 21 mg daily  Continue nonpsychiatric medications:  Amlodipine 10 mg daily  Doxycycline 100 mg twice daily for 5 days  Protonix 40 mg daily  Depo testosterone 40 mg every 7 days, given on 12/1/2021  We discussed side effects, benefits and alternative treatments and patient agrees with capacity to do so.  Rule 25 for chemical dependency treatment   will collect collateral information and make outpatient referrals  Staff to provide emotional support and redirect as needed  Patient encouraged to attend groups  Lab results: Reviewed personally  Consultation: Hospitalist consult t    Risk Assessment: Auditory hallucinations telling him to kill himself,  going for revocation of provisional discharge    Coordination of Care:   Patient seen, medical record reviewed, care coordinated with the team.    Total time:  More than 35 minutes spent on this visit with more than 50% time  spent on coordination of care with staff, educating patient about treatment options, side effects and benefits and alternative treatments for medications, providing supportive therapy regarding above symptoms.    This document is created with the help of Dragon dictation system.  All grammatical/typing errors or context distortion are unintentional and inherent to software.    Gillian Andrade MD        Re-Certification I certify that the inpatient psychiatric facility services furnished since the previous certification were, and continue to be, medically necessary for, either, treatment which could reasonably be expected to improve the patient s condition or diagnostic study and that the hospital records indicate that the services furnished were, either, intensive treatment services, admission and related services necessary for diagnostic study, or equivalent services.     I certify that the patient continues to need, on a daily basis, active treatment furnished directly by or requiring the supervision of inpatient psychiatric facility personnel.   I estimate TBD days of hospitalization is necessary for proper treatment of the patient. My plans for post-hospital care for this patient are : Medications, appointments     Gillian Andrade MD

## 2021-12-03 ENCOUNTER — TELEPHONE (OUTPATIENT)
Dept: FAMILY MEDICINE | Facility: CLINIC | Age: 31
End: 2021-12-03
Payer: COMMERCIAL

## 2021-12-03 PROCEDURE — 128N000001 HC R&B CD/MH ADULT

## 2021-12-03 PROCEDURE — 250N000013 HC RX MED GY IP 250 OP 250 PS 637: Performed by: PSYCHIATRY & NEUROLOGY

## 2021-12-03 PROCEDURE — 99232 SBSQ HOSP IP/OBS MODERATE 35: CPT | Performed by: PSYCHIATRY & NEUROLOGY

## 2021-12-03 PROCEDURE — 250N000013 HC RX MED GY IP 250 OP 250 PS 637: Performed by: NURSE PRACTITIONER

## 2021-12-03 RX ADMIN — CAFFEINE 100 MG: 200 TABLET ORAL at 06:11

## 2021-12-03 RX ADMIN — LITHIUM CARBONATE 300 MG: 300 TABLET, FILM COATED, EXTENDED RELEASE ORAL at 08:52

## 2021-12-03 RX ADMIN — NICOTINE POLACRILEX 2 MG: 2 GUM, CHEWING BUCCAL at 13:50

## 2021-12-03 RX ADMIN — CAFFEINE 100 MG: 200 TABLET ORAL at 12:37

## 2021-12-03 RX ADMIN — CLINDAMYCIN PHOSPHATE: 10 GEL TOPICAL at 20:15

## 2021-12-03 RX ADMIN — CLINDAMYCIN PHOSPHATE: 10 GEL TOPICAL at 08:51

## 2021-12-03 RX ADMIN — DOXYCYCLINE 100 MG: 50 CAPSULE ORAL at 20:14

## 2021-12-03 RX ADMIN — ZIPRASIDONE HCL 20 MG: 20 CAPSULE ORAL at 08:51

## 2021-12-03 RX ADMIN — MIRTAZAPINE 15 MG: 15 TABLET, FILM COATED ORAL at 20:16

## 2021-12-03 RX ADMIN — LITHIUM CARBONATE 900 MG: 300 TABLET, FILM COATED, EXTENDED RELEASE ORAL at 20:16

## 2021-12-03 RX ADMIN — AMLODIPINE BESYLATE 10 MG: 10 TABLET ORAL at 08:52

## 2021-12-03 RX ADMIN — ESCITALOPRAM OXALATE 10 MG: 10 TABLET ORAL at 08:52

## 2021-12-03 RX ADMIN — GABAPENTIN 900 MG: 300 CAPSULE ORAL at 20:15

## 2021-12-03 RX ADMIN — DOXYCYCLINE 100 MG: 50 CAPSULE ORAL at 08:52

## 2021-12-03 RX ADMIN — NICOTINE POLACRILEX 2 MG: 2 GUM, CHEWING BUCCAL at 09:31

## 2021-12-03 RX ADMIN — Medication 1 CAPSULE: at 06:40

## 2021-12-03 RX ADMIN — ZIPRASIDONE HCL 20 MG: 20 CAPSULE ORAL at 15:00

## 2021-12-03 RX ADMIN — CLONAZEPAM 1 MG: 1 TABLET ORAL at 16:04

## 2021-12-03 RX ADMIN — HYDROXYZINE HYDROCHLORIDE 25 MG: 25 TABLET, FILM COATED ORAL at 12:37

## 2021-12-03 RX ADMIN — GABAPENTIN 900 MG: 300 CAPSULE ORAL at 08:52

## 2021-12-03 RX ADMIN — Medication 1 CAPSULE: at 18:39

## 2021-12-03 RX ADMIN — NICOTINE POLACRILEX 2 MG: 2 GUM, CHEWING BUCCAL at 18:09

## 2021-12-03 RX ADMIN — GABAPENTIN 1200 MG: 400 CAPSULE ORAL at 13:50

## 2021-12-03 RX ADMIN — ACETAMINOPHEN 1000 MG: 500 TABLET ORAL at 06:11

## 2021-12-03 RX ADMIN — HYDROXYZINE HYDROCHLORIDE 25 MG: 25 TABLET, FILM COATED ORAL at 20:27

## 2021-12-03 RX ADMIN — Medication 1 PATCH: at 08:53

## 2021-12-03 RX ADMIN — NICOTINE POLACRILEX 2 MG: 2 GUM, CHEWING BUCCAL at 19:03

## 2021-12-03 RX ADMIN — PANTOPRAZOLE SODIUM 40 MG: 40 TABLET, DELAYED RELEASE ORAL at 08:52

## 2021-12-03 ASSESSMENT — ACTIVITIES OF DAILY LIVING (ADL)
DRESS: INDEPENDENT
DRESS: INDEPENDENT
LAUNDRY: WITH SUPERVISION
HYGIENE/GROOMING: INDEPENDENT
ORAL_HYGIENE: INDEPENDENT
HYGIENE/GROOMING: INDEPENDENT
ORAL_HYGIENE: INDEPENDENT
LAUNDRY: WITH SUPERVISION

## 2021-12-03 NOTE — PROGRESS NOTES
12/03/21 1026   Engagement   Intervention Group   Topic Detail OT: Wellness (Qigong) to promote physical wellness, pain management, relaxation and coping strategies to manage mental health symptoms.   Attendance Did not attend   Reason for Not Attending Refused  (Pt sitting for ~10 mins on periphery listening to headphones.)

## 2021-12-03 NOTE — PLAN OF CARE
Problem: Suicidal Behavior  Goal: Suicidal Behavior is Absent or Managed  Outcome: Improving     Problem: Sleep Disturbance (Anxiety Signs/Symptoms)  Goal: Improved Sleep (Anxiety Signs/Symptoms)  Outcome: Improving     Problem: Somatic Disturbance (Anxiety Signs/Symptoms)  Goal: Improved Somatic Symptoms (Anxiety Signs/Symptoms)  Outcome: Improving     Problem: Behavioral Health Plan of Care  Goal: Absence of New-Onset Illness or Injury  Outcome: Improving  Patient said to be angry, irritable and very agitated that he hit the window once toward the end of evening shift after his visitor left, patient expressed anger and frustration because according to him, he was at the verge of loosing his housing couple with the troubling command auditory hallucination was making him to have thoughts of suicide.  However the night went unremarkably for patient as he slept well without any incidence. Patient remains 1:1 staff monitoring for safety. No behavioral display this night, patient reports no SI/HI, SIB or auditory hallucinations. At about 0610 patient requested and received prn Caffeine 100 mg and Tylenol 1000 mg for a migraine headache rated 7/10 with good relief.  Staff will continue to monitor and document mood,behavior, thought process  and thought contents as well as establishing and maintaining a therapeutic relationship and rapport with patient.       Earle Mancini DNP, RN, APRN, CNS, AGCNS-BC

## 2021-12-03 NOTE — TELEPHONE ENCOUNTER
Reason for Call:  Other     Detailed comments: Needs an order placed for a  psychiatrist Berhane Weiner. Please advise when this has been placed.    Phone Number  [facility]  [phone]         Best Time:     Can we leave a detailed message on this number? Not Applicable    Call taken on 12/3/2021 at 8:42 AM by Benita Shipman

## 2021-12-03 NOTE — PLAN OF CARE
Goal review completed:       Patient agreeable to meeting with OT for goal review. Patient initially stating he hasn't met his goal of using three coping strategies, however with prompting pt able to ID coloring, music and walking as healthy coping strategies used daily on the unit for improvement of mental health symptoms. Pt has met goal this review period. New goal area added related to participating in new group activities to expand set of coping skills. Pt in agreement with new goal. Continue to correspond with interdisciplinary team regarding ongoing treatment plan, potential changes in patient's status, and discharge planning.    Problem: Coping with Symptoms  Goal: Practice Coping Skills  Description: Patient will demonstrate or verbalize use of 3 healthy coping strategies for anxiety management this reveiw period.   Outcome: Completed     Problem: Relapse Prevention  Goal: Engage in OT Group  Description: Engage in OT group activities that support recovery  Flowsheets (Taken 12/3/2021 8487)  Engage in OT group activities that support recovery: Pt will attempt 3 new group activities for at least 15 minutes for symptom management this review period.

## 2021-12-03 NOTE — PLAN OF CARE
Problem: Suicidal Behavior  Goal: Suicidal Behavior is Absent or Managed  Outcome: Improving     Problem: Mood Impairment (Anxiety Signs/Symptoms)  Goal: Improved Mood Symptoms (Anxiety Signs/Symptoms)  Outcome: Improving   Pt visible on the unit, did not participate in group activities, paced between his room and the lounge area with head radio on. Pt requested Atarax for anxiety 6/10, Caffeine for headache and helpful. Pt denied depression, HI, hallucination and did not contract for safety. Pt stated feelings of suicide with no plan. No discomfort observed.

## 2021-12-04 PROCEDURE — 250N000013 HC RX MED GY IP 250 OP 250 PS 637: Performed by: PSYCHIATRY & NEUROLOGY

## 2021-12-04 PROCEDURE — 250N000011 HC RX IP 250 OP 636: Performed by: PSYCHIATRY & NEUROLOGY

## 2021-12-04 PROCEDURE — 250N000013 HC RX MED GY IP 250 OP 250 PS 637: Performed by: NURSE PRACTITIONER

## 2021-12-04 PROCEDURE — 128N000001 HC R&B CD/MH ADULT

## 2021-12-04 RX ADMIN — LITHIUM CARBONATE 300 MG: 300 TABLET, FILM COATED, EXTENDED RELEASE ORAL at 08:35

## 2021-12-04 RX ADMIN — CAFFEINE 100 MG: 200 TABLET ORAL at 17:26

## 2021-12-04 RX ADMIN — PANTOPRAZOLE SODIUM 40 MG: 40 TABLET, DELAYED RELEASE ORAL at 08:35

## 2021-12-04 RX ADMIN — DOXYCYCLINE 100 MG: 50 CAPSULE ORAL at 20:04

## 2021-12-04 RX ADMIN — HYDROXYZINE HYDROCHLORIDE 25 MG: 25 TABLET, FILM COATED ORAL at 21:06

## 2021-12-04 RX ADMIN — ONDANSETRON 4 MG: 4 TABLET, ORALLY DISINTEGRATING ORAL at 07:06

## 2021-12-04 RX ADMIN — ACETAMINOPHEN 1000 MG: 500 TABLET ORAL at 17:26

## 2021-12-04 RX ADMIN — DOXYCYCLINE 100 MG: 50 CAPSULE ORAL at 08:35

## 2021-12-04 RX ADMIN — CLONAZEPAM 1 MG: 1 TABLET ORAL at 12:37

## 2021-12-04 RX ADMIN — Medication 1 CAPSULE: at 19:09

## 2021-12-04 RX ADMIN — NICOTINE POLACRILEX 2 MG: 2 GUM, CHEWING BUCCAL at 08:54

## 2021-12-04 RX ADMIN — GABAPENTIN 900 MG: 300 CAPSULE ORAL at 08:35

## 2021-12-04 RX ADMIN — ESCITALOPRAM OXALATE 10 MG: 10 TABLET ORAL at 08:36

## 2021-12-04 RX ADMIN — NICOTINE POLACRILEX 2 MG: 2 GUM, CHEWING BUCCAL at 06:23

## 2021-12-04 RX ADMIN — CLONAZEPAM 1 MG: 1 TABLET ORAL at 06:26

## 2021-12-04 RX ADMIN — MIRTAZAPINE 15 MG: 15 TABLET, FILM COATED ORAL at 20:04

## 2021-12-04 RX ADMIN — NICOTINE POLACRILEX 2 MG: 2 GUM, CHEWING BUCCAL at 19:11

## 2021-12-04 RX ADMIN — NICOTINE POLACRILEX 2 MG: 2 GUM, CHEWING BUCCAL at 16:10

## 2021-12-04 RX ADMIN — NICOTINE POLACRILEX 2 MG: 2 GUM, CHEWING BUCCAL at 10:51

## 2021-12-04 RX ADMIN — AMLODIPINE BESYLATE 10 MG: 10 TABLET ORAL at 08:35

## 2021-12-04 RX ADMIN — ZIPRASIDONE HCL 20 MG: 20 CAPSULE ORAL at 17:25

## 2021-12-04 RX ADMIN — ZIPRASIDONE HCL 20 MG: 20 CAPSULE ORAL at 08:36

## 2021-12-04 RX ADMIN — CLINDAMYCIN PHOSPHATE: 10 GEL TOPICAL at 20:04

## 2021-12-04 RX ADMIN — GABAPENTIN 1200 MG: 400 CAPSULE ORAL at 14:13

## 2021-12-04 RX ADMIN — NICOTINE POLACRILEX 2 MG: 2 GUM, CHEWING BUCCAL at 17:47

## 2021-12-04 RX ADMIN — LITHIUM CARBONATE 900 MG: 300 TABLET, FILM COATED, EXTENDED RELEASE ORAL at 20:04

## 2021-12-04 RX ADMIN — GABAPENTIN 900 MG: 300 CAPSULE ORAL at 20:04

## 2021-12-04 RX ADMIN — Medication 1 PATCH: at 08:40

## 2021-12-04 RX ADMIN — Medication 1 CAPSULE: at 06:23

## 2021-12-04 ASSESSMENT — MIFFLIN-ST. JEOR: SCORE: 1764.6

## 2021-12-04 ASSESSMENT — ACTIVITIES OF DAILY LIVING (ADL)
ORAL_HYGIENE: INDEPENDENT
HYGIENE/GROOMING: INDEPENDENT
DRESS: INDEPENDENT
ORAL_HYGIENE: INDEPENDENT
DRESS: INDEPENDENT
LAUNDRY: WITH SUPERVISION
LAUNDRY: WITH SUPERVISION
HYGIENE/GROOMING: INDEPENDENT

## 2021-12-04 NOTE — PROGRESS NOTES
12/04/21 1045   Engagement   Intervention Group   Topic Detail OT: Creative expression group (sand art) to promote concentration, healthy leisure, sequencing, and positive coping strateiges to manage mental heatlh symptoms.   Attendance Did not attend   Reason for Not Attending Excused  (Pt sleeping during gp.)

## 2021-12-04 NOTE — PROGRESS NOTES
0626-Clonazepam prn given to patient per his request for c/o anxiety 9/10. Nicotine gum also given.    0700-Pt stated anxiety is gone. Pt is social with staff and appeared calm and cooperative. No other concerns at this time.     0706- Pt c/o nausea and requested zofran prn.Given to patient as needed for nausea. Nursing will monitor.
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
## 2021-12-04 NOTE — PLAN OF CARE
Problem: Suicidal Behavior  Goal: Suicidal Behavior is Absent or Managed  Outcome: Improving     Problem: Mood Impairment (Anxiety Signs/Symptoms)  Goal: Improved Mood Symptoms (Anxiety Signs/Symptoms)  Outcome: Improving   Pt visible in the milieu and did not participate in group activities. Pt complained of anxiety 9/10 and requested Clonazepam x 1, helpful and slept after taking medication. Pt stated feelings of suicide ideation with no plan and can not help the way he is feeling. Pt denied depression, HI, hallucination and contract for safety.

## 2021-12-04 NOTE — PLAN OF CARE
Problem: Mood Impairment (Anxiety Signs/Symptoms)  Goal: Improved Mood Symptoms (Anxiety Signs/Symptoms)  Outcome: Improving     Problem: Social, Occupational or Functional Impairment (Anxiety Signs/Symptoms)  Goal: Enhanced Social, Occupational or Functional Skills (Anxiety Signs/Symptoms)  Outcome: Improving  Intervention: Promote Social, Occupational and Functional Ability  Recent Flowsheet Documentation  Taken 12/3/2021 1632 by Latrell Jean RN  Trust Relationship/Rapport:    care explained    empathic listening provided    emotional support provided    reassurance provided    thoughts/feelings acknowledged      Patient endorsed anxiety rated 9/10, requested and received prn clonopin 1 mg and Vistaril 25 mg with reported relief.  Endorsed depression 6/10.  Endorsed passive SI.  Endorsed audial hallucinations making negative statements.  Denied visual hallucinations.  Patient is visible, quiet, minimally engaged with peers.  Patient attended community meeting.  Cooperative with medications.

## 2021-12-05 LAB — SARS-COV-2 RNA RESP QL NAA+PROBE: NEGATIVE

## 2021-12-05 PROCEDURE — 250N000013 HC RX MED GY IP 250 OP 250 PS 637: Performed by: PSYCHIATRY & NEUROLOGY

## 2021-12-05 PROCEDURE — 128N000001 HC R&B CD/MH ADULT

## 2021-12-05 PROCEDURE — 250N000013 HC RX MED GY IP 250 OP 250 PS 637: Performed by: NURSE PRACTITIONER

## 2021-12-05 PROCEDURE — 87635 SARS-COV-2 COVID-19 AMP PRB: CPT | Performed by: PSYCHIATRY & NEUROLOGY

## 2021-12-05 RX ADMIN — ESCITALOPRAM OXALATE 10 MG: 10 TABLET ORAL at 09:05

## 2021-12-05 RX ADMIN — NICOTINE POLACRILEX 2 MG: 2 GUM, CHEWING BUCCAL at 07:40

## 2021-12-05 RX ADMIN — DOXYCYCLINE 100 MG: 50 CAPSULE ORAL at 20:57

## 2021-12-05 RX ADMIN — GABAPENTIN 1200 MG: 400 CAPSULE ORAL at 14:55

## 2021-12-05 RX ADMIN — LITHIUM CARBONATE 900 MG: 300 TABLET, FILM COATED, EXTENDED RELEASE ORAL at 20:57

## 2021-12-05 RX ADMIN — NICOTINE POLACRILEX 2 MG: 2 GUM, CHEWING BUCCAL at 05:47

## 2021-12-05 RX ADMIN — NICOTINE POLACRILEX 2 MG: 2 GUM, CHEWING BUCCAL at 20:58

## 2021-12-05 RX ADMIN — NICOTINE POLACRILEX 2 MG: 2 GUM, CHEWING BUCCAL at 09:06

## 2021-12-05 RX ADMIN — NICOTINE POLACRILEX 2 MG: 2 GUM, CHEWING BUCCAL at 14:19

## 2021-12-05 RX ADMIN — GABAPENTIN 900 MG: 300 CAPSULE ORAL at 09:05

## 2021-12-05 RX ADMIN — AMLODIPINE BESYLATE 10 MG: 10 TABLET ORAL at 09:05

## 2021-12-05 RX ADMIN — Medication 1 CAPSULE: at 20:00

## 2021-12-05 RX ADMIN — OLANZAPINE 10 MG: 10 TABLET, FILM COATED ORAL at 11:26

## 2021-12-05 RX ADMIN — MIRTAZAPINE 15 MG: 15 TABLET, FILM COATED ORAL at 20:57

## 2021-12-05 RX ADMIN — PANTOPRAZOLE SODIUM 40 MG: 40 TABLET, DELAYED RELEASE ORAL at 09:06

## 2021-12-05 RX ADMIN — ZIPRASIDONE HCL 20 MG: 20 CAPSULE ORAL at 17:33

## 2021-12-05 RX ADMIN — CLINDAMYCIN PHOSPHATE: 10 GEL TOPICAL at 14:55

## 2021-12-05 RX ADMIN — GABAPENTIN 900 MG: 300 CAPSULE ORAL at 20:55

## 2021-12-05 RX ADMIN — Medication 1 CAPSULE: at 05:47

## 2021-12-05 RX ADMIN — CLONAZEPAM 1 MG: 1 TABLET ORAL at 16:59

## 2021-12-05 RX ADMIN — Medication 1 PATCH: at 09:03

## 2021-12-05 RX ADMIN — LITHIUM CARBONATE 300 MG: 300 TABLET, FILM COATED, EXTENDED RELEASE ORAL at 09:06

## 2021-12-05 RX ADMIN — NICOTINE POLACRILEX 2 MG: 2 GUM, CHEWING BUCCAL at 11:26

## 2021-12-05 RX ADMIN — CLONAZEPAM 1 MG: 1 TABLET ORAL at 10:11

## 2021-12-05 RX ADMIN — ZIPRASIDONE HCL 20 MG: 20 CAPSULE ORAL at 09:06

## 2021-12-05 RX ADMIN — CLINDAMYCIN PHOSPHATE: 10 GEL TOPICAL at 20:55

## 2021-12-05 RX ADMIN — DOXYCYCLINE 100 MG: 50 CAPSULE ORAL at 09:05

## 2021-12-05 NOTE — PROGRESS NOTES
PSYCHIATRY PROGRESS NOTE         DATE OF SERVICE:   12/3/2021         CHIEF COMPLAINT:     Auditory hallucinations of derogatory type, suicidal thoughts off-and-on          OBJECTIVE:     Nursing reports : Takes medications, reports auditory hallucinations telling him to kill himself     reports working on outpatient referrals    Hospitalist consult by Star Mora APRN CNP on 12/1/2021  Elevated Blood Pressure without official hypertension diagnosis:  Dating back May 2021 per external facility problem list. No on BP medication outpatient.  Elevated blood pressures . On Amlodipine 5 mg.  Increase Amlodipine by 5 mg. Give additional dose today.  Continue to monitor blood pressures  For headache coffeine  with acetaminophen 1 g         SUBJECTIVE:      Prakash said that he felt angry or irritable last night.  He had auditory hallucinations.  He he had the window.  He says he thought about talking to group home and hearing that he would not be allowed to come back.  He continues to be on one-to-one.  He says that he had stable living situation for 2 years and now they are telling him they would not take him back.  I discussed it with the .  He says that he talked to the group home and the staff member said that Prakash could not come back.   said that it must of been one of the clients who said something like that, but it certainly was not decision that staff made.  The staff told  that Colt would be accepted back after his symptoms have been stabilized.  He is now under revocation of provisional discharge.  He has been under extended commitment since 2019.  He is on one-to-one for self-injurious behavior.  He asks if he could go with one-to-one supervision to AB unit to look at the library books and  something to read.  He is one-to-one agrees with that.  He says he continues to have auditory hallucinations, but they are not telling  "him so much to kill himself.  They are making negative comments.  He is paranoid.  He has off-and-on suicidal thoughts.  No thoughts of hurting others.  No altercation with staff or patients.         MEDICATIONS:       - Psychiatric Emergency -   Does not apply See Admin Instructions     amLODIPine  10 mg Oral Daily     clindamycin   Topical BID     doxycycline monohydrate  100 mg Oral Q12H RADHA     escitalopram  10 mg Oral Daily     fluPHENAZine decanoate  25 mg Intramuscular Q14 Days     gabapentin  1,200 mg Oral Daily     gabapentin  900 mg Oral BID     lactobacillus rhamnosus (GG)  1 capsule Oral BID     lithium ER  300 mg Oral QAM     lithium ER  900 mg Oral At Bedtime     mirtazapine  15 mg Oral At Bedtime     nicotine  1 patch Transdermal Daily     nicotine   Transdermal Q8H     pantoprazole  40 mg Oral Daily     testosterone cypionate  0.2 mL Subcutaneous Q7 Days     ziprasidone  20 mg Oral BID w/meals     caffeine **AND** acetaminophen, alum & mag hydroxide-simethicone, clonazePAM, hydrALAZINE, hydrOXYzine, nicotine, OLANZapine **OR** OLANZapine, ondansetron, polyethylene glycol    Medication adherence: Yes  Medication side effects: No  Benefit: Symptom reduction         ROS:   As per history of present illness, otherwise reminder of review of systems is negative for: General, eyes, ears, nose, throat, neck, respiratory, cardiovascular, gastrointestinal, genitourinary, meniscal skeletal, neurological, hematological, dermatological and endocrine system.         MENTAL STATUS EXAM:   BP (!) 146/102 (BP Location: Right arm, Patient Position: Sitting)   Pulse 94   Temp 97.9  F (36.6  C) (Oral)   Resp 16   Ht 1.651 m (5' 5\")   Wt 104.9 kg (231 lb 3.2 oz)   SpO2 99%   BMI 38.47 kg/m      Appearance:fair hygiene, cooperative  Orientation: Alert and oriented x3  Speech: Normal in rate and tone  Language ability: Normal syntax and vocabulary  Thought process: concrete  Thought content: Positive for auditory " hallucinations telling him to kill Associations: Connected  Suicidal Ideation: Off-and-on  Homicidal Ideation: Denies  Mood: Depressed  Affect: Irritable  Intellectual functioning:average  Fund of Knowledge: Consistent with education and experience  Attention/Concentration: decreased  Memory: intact  Psychomotor Behavior:  Mild agitation  Muscle Strength and Tone: no atrophy or involuntary movement  Gait and Station: steady  Insight and judgement: Impaired, going through revocation of provisional discharge, on extended commitment since 2019          LABS:   personally reviewed.     11/26/2021  COVID-19 test negative  Lithium level 0.9  TSH 4.95  Free T4 0.87  Pregnancy test negative  Creatinine 0.78    Lab Results   Component Value Date     11/26/2021     10/06/2021     05/19/2021     01/10/2021     12/17/2020    CO2 26 11/26/2021    CO2 21 10/06/2021    CO2 19 05/19/2021    CO2 21 01/10/2021    CO2 19 12/17/2020    BUN 16 11/26/2021    BUN 12 10/06/2021    BUN 11 05/19/2021    BUN 13 01/10/2021    BUN 11 12/17/2020     No results found for: CKTOTAL, CKMB, TROPONINI  Lab Results   Component Value Date    WBC 12.6 11/26/2021    WBC 12.4 10/06/2021    WBC 13.1 05/19/2021    WBC 12.0 01/10/2021    WBC 12.4 12/15/2020    HGB 14.0 11/26/2021    HGB 13.1 10/06/2021    HGB 13.6 05/19/2021    HGB 12.2 03/01/2021    HGB 13.0 01/10/2021    HCT 42.6 11/26/2021    HCT 40.8 10/06/2021    HCT 41.6 05/19/2021    HCT 39.8 01/10/2021    HCT 38.9 12/15/2020    MCV 81 11/26/2021    MCV 82 10/06/2021    MCV 84 05/19/2021    MCV 83 01/10/2021    MCV 85 12/15/2020     11/26/2021     10/06/2021     05/19/2021     01/10/2021     12/18/2020     Lab Results   Component Value Date    CHOL 181 04/28/2021    CHOL 188 03/01/2021    CHOL 230 10/23/2019    TRIG 317 04/28/2021    TRIG 358 03/01/2021    TRIG 211 10/23/2019    HDL 37 04/28/2021    HDL 66 03/01/2021    HDL 58 10/23/2019      ECG on 11/30/2021  Systolic Blood Pressure mmHg       Diastolic Blood Pressure mmHg      Ventricular Rate BPM 76      Atrial Rate BPM 76      IA Interval ms 158      QRS Duration ms 98      QT ms 390      QTc ms 438      P Axis degrees 31      R AXIS degrees 25      T Axis degrees 27      Interpretation ECG  Sinus rhythm   Normal ECG        Ref. Range 11/26/2021 23:03 11/27/2021 17:27 12/1/2021 13:41   Sodium Latest Ref Range: 133 - 144 mmol/L 137     Potassium Latest Ref Range: 3.4 - 5.3 mmol/L 3.6     Chloride Latest Ref Range: 94 - 109 mmol/L 105     Carbon Dioxide Latest Ref Range: 20 - 32 mmol/L 26     Urea Nitrogen Latest Ref Range: 7 - 30 mg/dL 16     Creatinine Latest Ref Range: 0.52 - 1.25 mg/dL 0.78     GFR Estimate Latest Ref Range: >60 mL/min/1.73m2 >90     Calcium Latest Ref Range: 8.5 - 10.1 mg/dL 10.1     Anion Gap Latest Ref Range: 3 - 14 mmol/L 6     HCG Qual Urine Latest Ref Range: Negative   Negative    T4 Free Latest Units: ng/dL 0.87     TSH Latest Ref Range: 0.40 - 4.00 mU/L 4.95 (H)     Glucose Latest Ref Range: 70 - 99 mg/dL 93     GLUCOSE BY METER POCT Latest Ref Range: 70 - 99 mg/dL   157 (H)   WBC Latest Ref Range: 4.0 - 11.0 10e3/uL 12.6 (H)     Hemoglobin Latest Ref Range: 11.7 - 17.7 g/dL 14.0     Hematocrit Latest Ref Range: 35.0 - 53.0 % 42.6     Platelet Count Latest Ref Range: 150 - 450 10e3/uL 358     RBC Count Latest Ref Range: 3.80 - 5.90 10e6/uL 5.27     MCV Latest Ref Range: 78 - 100 fL 81     MCH Latest Ref Range: 26.5 - 33.0 pg 26.6     MCHC Latest Ref Range: 31.5 - 36.5 g/dL 32.9     RDW Latest Ref Range: 10.0 - 15.0 % 14.9     % Neutrophils Latest Units: % 67     % Lymphocytes Latest Units: % 25     % Monocytes Latest Units: % 5     % Eosinophils Latest Units: % 2     % Basophils Latest Units: % 1     Absolute Basophils Latest Ref Range: 0.0 - 0.2 10e3/uL 0.1     Absolute Eosinophils Latest Ref Range: 0.0 - 0.7 10e3/uL 0.2     Absolute Immature Granulocytes Latest Ref  Range: <=0.0 10e3/uL 0.0     Absolute Lymphocytes Latest Ref Range: 0.8 - 5.3 10e3/uL 3.2     Absolute Monocytes Latest Ref Range: 0.0 - 1.3 10e3/uL 0.7     % Immature Granulocytes Latest Units: % 0     Absolute Neutrophils Latest Ref Range: 1.6 - 8.3 10e3/uL 8.4 (H)     Absolute NRBCs Latest Units: 10e3/uL 0.0     NRBCs per 100 WBC Latest Ref Range: <1 /100 0     SARS CoV2 PCR Latest Ref Range: Negative  Negative     Lithium Level Latest Ref Range:   mmol/L 0.9       No results found for this or any previous visit (from the past 24 hour(s)).         DIAGNOSIS:     Schizoaffective disorder chronic with acute exacerbation  Generalized anxiety disorder  Posttraumatic stress disorder  ADHD combined type  Borderline personality disorder  Marijuana dependency    Patient Active Problem List   Diagnosis     ADHD (attention deficit hyperactivity disorder)     Bipolar 1 disorder, manic, mild     Marijuana abuse     Polysubstance abuse (H)     GERD (gastroesophageal reflux disease)     Tobacco abuse     Abdominal pain, right upper quadrant     Intractable back pain     Optic neuritis     Cauda equina syndrome with neurogenic bladder (H)     Schizoaffective disorder, bipolar type (H)     PTSD (post-traumatic stress disorder)     Anxiety     Auditory hallucination     Class 2 obesity due to excess calories in adult     Nephrolithiasis     Cyst of left ovary     Borderline personality disorder (H)     Cannabis dependence (H)     Depression     Episodic mood disorder (H)     History of heroin abuse (H)     Moderate episode of recurrent major depressive disorder (H)     Opioid use disorder, severe, dependence (H)     Substance-induced psychotic disorder with hallucinations (H)     Nausea     Overdose     Suicidal ideation     Bella (H)     Spell of altered consciousness     Urinary retention     Obesity (BMI 35.0-39.9) with comorbidity (H)     Chronic bilateral low back pain without sciatica     AVA (generalized anxiety disorder)      Aggressive behavior     Gender identity disorder     Lumbosacral radiculopathy at L5     DUB (dysfunctional uterine bleeding)     Seizure-like activity (H)     Elevated blood pressure reading without diagnosis of hypertension     Acanthosis nigricans     Viral gastroenteritis     Prediabetes     Schizoaffective disorder, chronic condition with acute exacerbation (H)     Bipolar affective disorder, mixed, severe, with psychotic behavior (H)     Schizophrenia, schizoaffective, chronic with acute exacerbation (H)          PLAN:   Patient continues to have auditory hallucinations, now of derogatory type.  Provisional discharge has been revoked.  He has been under extended commitment since 2019.  He is on one-to-one for safety reasons.  We discussed diagnosis and treatment plan and patient agrees with the following recommendations:    Medications:  Geodon 20 mg twice daily with food for psychosis  Remeron 15 mg nightly for sleep  Klonopin 1 mg twice daily as needed for anxiety  Prolixin Decanoate 25 mg IM every 14 days, given on 12/1/2021  Lithobid 300 mg every morning and 900 mg nightly  Neurontin 900 mg twice daily and 1200 mg midday  Lexapro 10 mg daily  Nicotine patch 21 mg daily  Continue nonpsychiatric medications:  Amlodipine 10 mg daily  Doxycycline 100 mg twice daily for 5 days  Protonix 40 mg daily  Depo testosterone 40 mg every 7 days, given on 12/1/2021  We discussed side effects, benefits and alternative treatments and patient agrees with capacity to do so.  Rule 25 for chemical dependency treatment   will collect collateral information and make outpatient referrals  Staff to provide emotional support and redirect as needed  Patient encouraged to attend groups  Lab results: Reviewed personally  Consultation: Hospitalist consult   Continue one-to-one for safety    Risk Assessment: Chronic persistent mental illness, an extended commitment since 2019, revoked provisional  discharge    Coordination of Care:   Patient seen, medical record reviewed, care coordinated with the team.      This document is created with the help of Dragon dictation system.  All grammatical/typing errors or context distortion are unintentional and inherent to software.    Gillian Andrade MD        Re-Certification I certify that the inpatient psychiatric facility services furnished since the previous certification were, and continue to be, medically necessary for, either, treatment which could reasonably be expected to improve the patient s condition or diagnostic study and that the hospital records indicate that the services furnished were, either, intensive treatment services, admission and related services necessary for diagnostic study, or equivalent services.     I certify that the patient continues to need, on a daily basis, active treatment furnished directly by or requiring the supervision of inpatient psychiatric facility personnel.   I estimate TBD days of hospitalization is necessary for proper treatment of the patient. My plans for post-hospital care for this patient are : Medications, appointments     Gillian Andrade MD

## 2021-12-05 NOTE — PROGRESS NOTES
"   12/05/21 1039   Engagement   Intervention Group   Topic Detail OT: Creative expression (sun catchers) to promote concentration, sequencing, organization and healthy leisure.   Attendance Attended   Patient Response Needs reinforcement/repetition to learn materials   Concentrated on Task duration of group   Cognition Goal-directed   Mood/Affect Content   Social/Behavioral Engaged;Cooperative   Thought Content Reality oriented   Goals addressed in session today Pt progressing toward OT goal during gp today by ID'ing coloring as coping strategy for managing mental health symptoms. Pt inititally declining gp project stating, \"It's too hard for me.\" With encouragement from staff pt choosing project and initiating with min A. Pt with min A for problem solving throughout project. Pt expressing being proud of self following gp activity.     "

## 2021-12-05 NOTE — PROGRESS NOTES
PSYCHIATRY PROGRESS NOTE         DATE OF SERVICE:   12/2/2021         CHIEF COMPLAINT:     Hallucinations of command type, suciidal thoughts, self injurious behavior, on 1 to 1           OBJECTIVE:     Nursing reports : Takes medications, reports auditory hallucinations telling him to kill himself     reports working on outpatient referrals    Hospitalist consult by Star Mora APRN CNP on 12/2/2021  Elevated Blood Pressure without official hypertension diagnosis:  Dating back May 2021 per external facility problem list. Not on BP medication outpatient.  Amlodipine started during this admission. Increased Amlodipine dose 12/01  BP elevated prior to morning dose Amlodipine.   Recommend manual blood pressures for more accurate blood pressure readings.   PRN Hydralazine with parameters (inpatient use only)  Will need follow up outpatient after discharge.         SUBJECTIVE:      Prakash continues to have auditory hallucinations telling him to kill himself. He is angry about revocation of PD, but he says he can understand why it is needed. He wanted to leave the hospital while suicidal and psychotic, labile and agitated, and it was not safe, so revocation was initiated. He tolerates medication change well. He says that Geodon generally worked better for hallucinations in the past.. She had ECG on 11/30/2021 and qt/qtc was normal of   390/338.  He pocked himself with pencil. He is on 1 to 1. He says he wants discharge. I explained that if he wants discharge, he has to work on processing his emotions and using adequate coping skills  instead of maladaptive self injurious behavior. We discussed distraction methods, such as using elastic band, squize ball, head phones, talking to staff, using DBT techniques.    Sleep fluctuates. Energy and concentration decreased.He is anxious about his GH. He says he does not like it. He says he would like to live alone. I explained that is not option at this  time. He has been under extended commitment x 2 years and he needs supervised living. I discussed controlled substances and I would not order them for her if I were her OP doctor, if she lived unsupervised. Even supervised , she gets Klonopin only 1 week at the time.She says that she takes it as ordered and she knows that her psychiatrist would not order it if she used it inappropriately.She also understands that she will have to call her psychiatrist for OP order, and no controlled substances will be prescribed by me.She denies other medical problems. No altercations with staff and patients.. She is followed by hosoitalist for elevated blood pressure, and Amlodipine dose was increased and Hydralazine prn ordered.         MEDICATIONS:       - Psychiatric Emergency -   Does not apply See Admin Instructions     amLODIPine  10 mg Oral Daily     clindamycin   Topical BID     doxycycline monohydrate  100 mg Oral Q12H RADHA     escitalopram  10 mg Oral Daily     fluPHENAZine decanoate  25 mg Intramuscular Q14 Days     gabapentin  1,200 mg Oral Daily     gabapentin  900 mg Oral BID     lactobacillus rhamnosus (GG)  1 capsule Oral BID     lithium ER  300 mg Oral QAM     lithium ER  900 mg Oral At Bedtime     mirtazapine  15 mg Oral At Bedtime     nicotine  1 patch Transdermal Daily     nicotine   Transdermal Q8H     pantoprazole  40 mg Oral Daily     testosterone cypionate  0.2 mL Subcutaneous Q7 Days     ziprasidone  20 mg Oral BID w/meals     caffeine **AND** acetaminophen, alum & mag hydroxide-simethicone, clonazePAM, hydrALAZINE, hydrOXYzine, nicotine, OLANZapine **OR** OLANZapine, ondansetron, polyethylene glycol    Medication adherence: Yes  Medication side effects: No  Benefit: Symptom reduction         ROS:   As per history of present illness, otherwise reminder of review of systems is negative for: General, eyes, ears, nose, throat, neck, respiratory, cardiovascular, gastrointestinal, genitourinary, meniscal  "skeletal, neurological, hematological, dermatological and endocrine system.         MENTAL STATUS EXAM:   BP (!) 146/102 (BP Location: Right arm, Patient Position: Sitting)   Pulse 94   Temp 97.9  F (36.6  C) (Oral)   Resp 16   Ht 1.651 m (5' 5\")   Wt 104.9 kg (231 lb 3.2 oz)   SpO2 99%   BMI 38.47 kg/m      Appearance:fair hygiene, cooperative  Orientation: Alert and oriented x3  Speech: Normal in rate and tone  Language ability: Normal syntax and vocabulary  Thought process: concrete  Thought content: positive  for auditory hallucinations telling him to kill himself  Associations: Connected  Suicidal Ideation: positive   Homicidal Ideation: negative   Mood: Depressed  Affect: irritable   Intellectual functioning:average  Fund of Knowledge: Consistent with education and experience  Attention/Concentration: decreased  Memory: intact  Psychomotor Behavior:  Mild agitation  Muscle Strength and Tone: no atrophy or involuntary movement  Gait and Station: steady  Insight and judgement: Impaired, going through revocation of provisional discharge, on extended commitment since 2019          LABS:   personally reviewed.     11/26/2021  COVID-19 test negative  Lithium level 0.9  TSH 4.95  Free T4 0.87  Pregnancy test negative  Creatinine 0.78    Lab Results   Component Value Date     11/26/2021     10/06/2021     05/19/2021     01/10/2021     12/17/2020    CO2 26 11/26/2021    CO2 21 10/06/2021    CO2 19 05/19/2021    CO2 21 01/10/2021    CO2 19 12/17/2020    BUN 16 11/26/2021    BUN 12 10/06/2021    BUN 11 05/19/2021    BUN 13 01/10/2021    BUN 11 12/17/2020       Lab Results   Component Value Date    WBC 12.6 11/26/2021    WBC 12.4 10/06/2021    WBC 13.1 05/19/2021    WBC 12.0 01/10/2021    WBC 12.4 12/15/2020    HGB 14.0 11/26/2021    HGB 13.1 10/06/2021    HGB 13.6 05/19/2021    HGB 12.2 03/01/2021    HGB 13.0 01/10/2021    HCT 42.6 11/26/2021    HCT 40.8 10/06/2021    HCT 41.6 " 05/19/2021    HCT 39.8 01/10/2021    HCT 38.9 12/15/2020    MCV 81 11/26/2021    MCV 82 10/06/2021    MCV 84 05/19/2021    MCV 83 01/10/2021    MCV 85 12/15/2020     11/26/2021     10/06/2021     05/19/2021     01/10/2021     12/18/2020     Lab Results   Component Value Date    CHOL 181 04/28/2021    CHOL 188 03/01/2021    CHOL 230 10/23/2019    TRIG 317 04/28/2021    TRIG 358 03/01/2021    TRIG 211 10/23/2019    HDL 37 04/28/2021    HDL 66 03/01/2021    HDL 58 10/23/2019     ECG on 11/30/2021  Systolic Blood Pressure mmHg       Diastolic Blood Pressure mmHg      Ventricular Rate BPM 76      Atrial Rate BPM 76      MS Interval ms 158      QRS Duration ms 98      QT ms 390      QTc ms 438      P Axis degrees 31      R AXIS degrees 25      T Axis degrees 27      Interpretation ECG  Sinus rhythm   Normal ECG        Ref. Range 11/26/2021 23:03 11/27/2021 17:27 12/1/2021 13:41   Sodium Latest Ref Range: 133 - 144 mmol/L 137     Potassium Latest Ref Range: 3.4 - 5.3 mmol/L 3.6     Chloride Latest Ref Range: 94 - 109 mmol/L 105     Carbon Dioxide Latest Ref Range: 20 - 32 mmol/L 26     Urea Nitrogen Latest Ref Range: 7 - 30 mg/dL 16     Creatinine Latest Ref Range: 0.52 - 1.25 mg/dL 0.78     GFR Estimate Latest Ref Range: >60 mL/min/1.73m2 >90     Calcium Latest Ref Range: 8.5 - 10.1 mg/dL 10.1     Anion Gap Latest Ref Range: 3 - 14 mmol/L 6     HCG Qual Urine Latest Ref Range: Negative   Negative    T4 Free Latest Units: ng/dL 0.87     TSH Latest Ref Range: 0.40 - 4.00 mU/L 4.95 (H)     Glucose Latest Ref Range: 70 - 99 mg/dL 93     GLUCOSE BY METER POCT Latest Ref Range: 70 - 99 mg/dL   157 (H)   WBC Latest Ref Range: 4.0 - 11.0 10e3/uL 12.6 (H)     Hemoglobin Latest Ref Range: 11.7 - 17.7 g/dL 14.0     Hematocrit Latest Ref Range: 35.0 - 53.0 % 42.6     Platelet Count Latest Ref Range: 150 - 450 10e3/uL 358     RBC Count Latest Ref Range: 3.80 - 5.90 10e6/uL 5.27     MCV Latest Ref  Range: 78 - 100 fL 81     MCH Latest Ref Range: 26.5 - 33.0 pg 26.6     MCHC Latest Ref Range: 31.5 - 36.5 g/dL 32.9     RDW Latest Ref Range: 10.0 - 15.0 % 14.9     % Neutrophils Latest Units: % 67     % Lymphocytes Latest Units: % 25     % Monocytes Latest Units: % 5     % Eosinophils Latest Units: % 2     % Basophils Latest Units: % 1     Absolute Basophils Latest Ref Range: 0.0 - 0.2 10e3/uL 0.1     Absolute Eosinophils Latest Ref Range: 0.0 - 0.7 10e3/uL 0.2     Absolute Immature Granulocytes Latest Ref Range: <=0.0 10e3/uL 0.0     Absolute Lymphocytes Latest Ref Range: 0.8 - 5.3 10e3/uL 3.2     Absolute Monocytes Latest Ref Range: 0.0 - 1.3 10e3/uL 0.7     % Immature Granulocytes Latest Units: % 0     Absolute Neutrophils Latest Ref Range: 1.6 - 8.3 10e3/uL 8.4 (H)     Absolute NRBCs Latest Units: 10e3/uL 0.0     NRBCs per 100 WBC Latest Ref Range: <1 /100 0     SARS CoV2 PCR Latest Ref Range: Negative  Negative     Lithium Level Latest Ref Range:   mmol/L 0.9           DIAGNOSIS:     Schizoaffective disorder chronic with acute exacerbation  Generalized anxiety disorder  Posttraumatic stress disorder  ADHD combined type  Borderline personality disorder  Marijuana dependency    Patient Active Problem List   Diagnosis     ADHD (attention deficit hyperactivity disorder)     Bipolar 1 disorder, manic, mild     Marijuana abuse     Polysubstance abuse (H)     GERD (gastroesophageal reflux disease)     Tobacco abuse     Abdominal pain, right upper quadrant     Intractable back pain     Optic neuritis     Cauda equina syndrome with neurogenic bladder (H)     Schizoaffective disorder, bipolar type (H)     PTSD (post-traumatic stress disorder)     Anxiety     Auditory hallucination     Class 2 obesity due to excess calories in adult     Nephrolithiasis     Cyst of left ovary     Borderline personality disorder (H)     Cannabis dependence (H)     Depression     Episodic mood disorder (H)     History of heroin abuse (H)      Moderate episode of recurrent major depressive disorder (H)     Opioid use disorder, severe, dependence (H)     Substance-induced psychotic disorder with hallucinations (H)     Nausea     Overdose     Suicidal ideation     Bella (H)     Spell of altered consciousness     Urinary retention     Obesity (BMI 35.0-39.9) with comorbidity (H)     Chronic bilateral low back pain without sciatica     AVA (generalized anxiety disorder)     Aggressive behavior     Gender identity disorder     Lumbosacral radiculopathy at L5     DUB (dysfunctional uterine bleeding)     Seizure-like activity (H)     Elevated blood pressure reading without diagnosis of hypertension     Acanthosis nigricans     Viral gastroenteritis     Prediabetes     Schizoaffective disorder, chronic condition with acute exacerbation (H)     Bipolar affective disorder, mixed, severe, with psychotic behavior (H)     Schizophrenia, schizoaffective, chronic with acute exacerbation (H)          PLAN:   Patient continues to have auditory hallucinations telling him  to kill himself.  He is going through revocation of provisional discharge.  He has been under extended commitment since 2019.  We discussed diagnosis and treatment plan and patient agrees with the following recommendations:    Medications:   Geodon 20 mg twice daily with food for psychosis  Remeron 15 mg nightly for sleep  Klonopin 1 mg twice daily as needed for anxiety  Prolixin Decanoate 25 mg IM every 14 days, given on 12/1/2021  Lithobid 300 mg every morning and 900 mg nightly  Neurontin 900 mg twice daily and 1200 mg midday  Lexapro 10 mg daily  Nicotine patch 21 mg daily  Continue nonpsychiatric medications:  Amlodipine 10 mg daily  Doxycycline 100 mg twice daily for 5 days  Protonix 40 mg daily  Depo testosterone 40 mg every 7 days, given on 12/1/2021  We discussed side effects, benefits and alternative treatments and patient agrees with capacity to do so.  Rule 25 for chemical dependency  treatment   will collect collateral information and make outpatient referrals  Staff to provide emotional support and redirect as needed  Patient encouraged to attend groups  Lab results: Reviewed personally  Consultation: Hospitalist consult t    Risk Assessment: Auditory hallucinations telling him to kill himself, going for revocation of provisional discharge    Coordination of Care:   Patient seen, medical record reviewed, care coordinated with the team.    Total time:  More than 35 minutes  spent on this visit with more than 50% time  spent on coordination of care with staff, psycho education  And providing supportive therapy regarding above symptoms.    This document is created with the help of Dragon dictation system.  All grammatical/typing errors or context distortion are unintentional and inherent to software.    Gillian Andrade MD        Re-Certification I certify that the inpatient psychiatric facility services furnished since the previous certification were, and continue to be, medically necessary for, either, treatment which could reasonably be expected to improve the patient s condition or diagnostic study and that the hospital records indicate that the services furnished were, either, intensive treatment services, admission and related services necessary for diagnostic study, or equivalent services.     I certify that the patient continues to need, on a daily basis, active treatment furnished directly by or requiring the supervision of inpatient psychiatric facility personnel.   I estimate TBD days of hospitalization is necessary for proper treatment of the patient. My plans for post-hospital care for this patient are : Medications, appointments     Gillian Andrade MD

## 2021-12-05 NOTE — PLAN OF CARE
"  Problem: Behavioral Health Plan of Care  Goal: Patient-Specific Goal (Individualization)  Outcome: Improving  Note: Shift Summary: Pt acknowledges anxiety, restlessness, remains on 1:1 for safety r/t SIB.    Patient's Stated Goal for Shift: To manage his anxiety, stay busy, to not self-harm    Goal Status:  In process    Pt's affect is pleasant, coop. Engaged in activities/conversation with 1:1 as well as this writer. Pt is med-compliant.     Denies pain, denies SI at this time.    Acknowledges feeling anxious, pt asks twice if Dr. Andrade can come to see him today. He states that he wants to start \"a new medicine.\" Writer is able to redirect patient, prn klonopin and later prn Zyprexa are administered.    Pt stays busy with artwork, OT group.    Pt's visitor is turned away by staff due to strong odor of cannabis. Pt demonstrates a calm manner regarding this news. Pt also intermittently uses headphones.    Room checks/sweeps performed per unit protocol.     Luz Diaz RN         "

## 2021-12-05 NOTE — PROGRESS NOTES
Remainder of day shift went well for patient. He remained in common area with 1:1, working intermittently on artwork. Intermittently social with peers and staff. Pt denies SI. Affect is flat, pleasant, coop.    Will leave voicemail for manager about pt's request for support dog to visit.    Luz Diaz RN

## 2021-12-05 NOTE — PLAN OF CARE
Problem: Activity and Energy Impairment (Anxiety Signs/Symptoms)  Goal: Optimized Energy Level (Anxiety Signs/Symptoms)  Outcome: Improving     Problem: Mood Impairment (Anxiety Signs/Symptoms)  Goal: Improved Mood Symptoms (Anxiety Signs/Symptoms)  Outcome: Improving     Problem: Somatic Disturbance (Anxiety Signs/Symptoms)  Goal: Improved Somatic Symptoms (Anxiety Signs/Symptoms)  Outcome: Improving    Patient endorsed anxiety and depression rated 5/10; stating that he is feeling better than yesterday.  Endorsed AH and passive SI, contracted to be safe. Denied visual hallucinations.  Patient spent most of the shift [facility] area, more engaged and social with staff and peers.  Attended community meeting.  Patient was more verbal and has a brighter affect today compared to yesterday.  Received prn vistaril 25 mg.  Showered.  Patient requested to speak to a psychologist, stating that he has some relationship challenges that he would like to get help with. Cooperative with medications.

## 2021-12-05 NOTE — PLAN OF CARE
Problem: Activity and Energy Impairment (Anxiety Signs/Symptoms)  Goal: Optimized Energy Level (Anxiety Signs/Symptoms)  Outcome: Improving     Problem: Mood Impairment (Anxiety Signs/Symptoms)  Goal: Improved Mood Symptoms (Anxiety Signs/Symptoms)  Outcome: Improving     Problem: Behavioral Health Plan of Care  Goal: Plan of Care Review  Outcome: Improving  Flowsheets (Taken 12/5/2021 3835)  Plan of Care Reviewed With: patient  Patient Agreement with Plan of Care: agrees      Endorsed anxiety rated 8/10, requested and received prn Klonopin 1 mg with reported relief.  Denied depression.  Denied SI/HI/AH/VH.  Patient attended community meeting.  Minimally engaged with peers.  Flat affect.  Cooperative with medications.  Pt reported that he is increasingly hungry all the time and wondered whether it was a side effect of medications.    PT was encouraged to eat healthier snacks; offered apples.

## 2021-12-05 NOTE — PLAN OF CARE
Problem: Suicidal Behavior  Goal: Suicidal Behavior is Absent or Managed  Outcome: Improving     Problem: Mood Impairment (Anxiety Signs/Symptoms)  Goal: Improved Mood Symptoms (Anxiety Signs/Symptoms)  Outcome: Improving     Pt slept through the night without distress. No concerns reported. Continues to be monitored for assault and self injurious behavior. Pt is on continuous close monitoring SIB. No attempt to harm self noted. Nursing will monitor and follow plan of care.

## 2021-12-06 DIAGNOSIS — F90.2 ATTENTION DEFICIT HYPERACTIVITY DISORDER (ADHD), COMBINED TYPE: ICD-10-CM

## 2021-12-06 DIAGNOSIS — F31.11 BIPOLAR 1 DISORDER, MANIC, MILD (H): Primary | ICD-10-CM

## 2021-12-06 DIAGNOSIS — F25.0 SCHIZOAFFECTIVE DISORDER, BIPOLAR TYPE (H): ICD-10-CM

## 2021-12-06 DIAGNOSIS — F41.9 ANXIETY: ICD-10-CM

## 2021-12-06 PROCEDURE — 250N000011 HC RX IP 250 OP 636: Performed by: PSYCHIATRY & NEUROLOGY

## 2021-12-06 PROCEDURE — 250N000013 HC RX MED GY IP 250 OP 250 PS 637: Performed by: NURSE PRACTITIONER

## 2021-12-06 PROCEDURE — 99233 SBSQ HOSP IP/OBS HIGH 50: CPT | Performed by: PSYCHIATRY & NEUROLOGY

## 2021-12-06 PROCEDURE — 250N000013 HC RX MED GY IP 250 OP 250 PS 637: Performed by: PSYCHIATRY & NEUROLOGY

## 2021-12-06 PROCEDURE — 128N000001 HC R&B CD/MH ADULT

## 2021-12-06 RX ORDER — LISDEXAMFETAMINE DIMESYLATE 20 MG/1
20 CAPSULE ORAL DAILY
Status: DISCONTINUED | OUTPATIENT
Start: 2021-12-06 | End: 2021-12-08

## 2021-12-06 RX ORDER — ZIPRASIDONE HYDROCHLORIDE 40 MG/1
40 CAPSULE ORAL 2 TIMES DAILY WITH MEALS
Status: DISCONTINUED | OUTPATIENT
Start: 2021-12-06 | End: 2021-12-10 | Stop reason: HOSPADM

## 2021-12-06 RX ADMIN — Medication 1 CAPSULE: at 08:21

## 2021-12-06 RX ADMIN — ACETAMINOPHEN 1000 MG: 500 TABLET ORAL at 07:03

## 2021-12-06 RX ADMIN — NICOTINE POLACRILEX 2 MG: 2 GUM, CHEWING BUCCAL at 15:14

## 2021-12-06 RX ADMIN — PANTOPRAZOLE SODIUM 40 MG: 40 TABLET, DELAYED RELEASE ORAL at 08:24

## 2021-12-06 RX ADMIN — NICOTINE POLACRILEX 2 MG: 2 GUM, CHEWING BUCCAL at 20:26

## 2021-12-06 RX ADMIN — LITHIUM CARBONATE 900 MG: 300 TABLET, FILM COATED, EXTENDED RELEASE ORAL at 20:16

## 2021-12-06 RX ADMIN — NICOTINE POLACRILEX 2 MG: 2 GUM, CHEWING BUCCAL at 06:45

## 2021-12-06 RX ADMIN — NICOTINE POLACRILEX 2 MG: 2 GUM, CHEWING BUCCAL at 19:24

## 2021-12-06 RX ADMIN — ZIPRASIDONE HCL 20 MG: 20 CAPSULE ORAL at 08:24

## 2021-12-06 RX ADMIN — CLINDAMYCIN PHOSPHATE: 10 GEL TOPICAL at 20:16

## 2021-12-06 RX ADMIN — CLINDAMYCIN PHOSPHATE: 10 GEL TOPICAL at 08:19

## 2021-12-06 RX ADMIN — CAFFEINE 100 MG: 200 TABLET ORAL at 08:27

## 2021-12-06 RX ADMIN — Medication 1 CAPSULE: at 18:14

## 2021-12-06 RX ADMIN — AMLODIPINE BESYLATE 10 MG: 10 TABLET ORAL at 08:17

## 2021-12-06 RX ADMIN — HYDROXYZINE HYDROCHLORIDE 25 MG: 25 TABLET, FILM COATED ORAL at 21:32

## 2021-12-06 RX ADMIN — GABAPENTIN 1200 MG: 400 CAPSULE ORAL at 15:01

## 2021-12-06 RX ADMIN — DOXYCYCLINE 100 MG: 50 CAPSULE ORAL at 08:20

## 2021-12-06 RX ADMIN — HYDROXYZINE HYDROCHLORIDE 25 MG: 25 TABLET, FILM COATED ORAL at 00:41

## 2021-12-06 RX ADMIN — OLANZAPINE 10 MG: 10 INJECTION, POWDER, FOR SOLUTION INTRAMUSCULAR at 21:51

## 2021-12-06 RX ADMIN — NICOTINE POLACRILEX 2 MG: 2 GUM, CHEWING BUCCAL at 18:24

## 2021-12-06 RX ADMIN — LISDEXAMFETAMINE DIMESYLATE 20 MG: 20 CAPSULE ORAL at 15:02

## 2021-12-06 RX ADMIN — NICOTINE POLACRILEX 2 MG: 2 GUM, CHEWING BUCCAL at 12:09

## 2021-12-06 RX ADMIN — ONDANSETRON 4 MG: 4 TABLET, ORALLY DISINTEGRATING ORAL at 10:31

## 2021-12-06 RX ADMIN — GABAPENTIN 900 MG: 300 CAPSULE ORAL at 08:25

## 2021-12-06 RX ADMIN — ZIPRASIDONE HCL 40 MG: 40 CAPSULE ORAL at 18:14

## 2021-12-06 RX ADMIN — CLONAZEPAM 1 MG: 1 TABLET ORAL at 11:41

## 2021-12-06 RX ADMIN — CLONAZEPAM 1 MG: 1 TABLET ORAL at 16:41

## 2021-12-06 RX ADMIN — NICOTINE POLACRILEX 2 MG: 2 GUM, CHEWING BUCCAL at 08:34

## 2021-12-06 RX ADMIN — NICOTINE POLACRILEX 2 MG: 2 GUM, CHEWING BUCCAL at 16:41

## 2021-12-06 RX ADMIN — GABAPENTIN 900 MG: 300 CAPSULE ORAL at 20:16

## 2021-12-06 RX ADMIN — NICOTINE POLACRILEX 2 MG: 2 GUM, CHEWING BUCCAL at 10:55

## 2021-12-06 RX ADMIN — Medication 1 PATCH: at 08:23

## 2021-12-06 RX ADMIN — ESCITALOPRAM OXALATE 10 MG: 10 TABLET ORAL at 08:25

## 2021-12-06 RX ADMIN — LITHIUM CARBONATE 300 MG: 300 TABLET, FILM COATED, EXTENDED RELEASE ORAL at 08:22

## 2021-12-06 RX ADMIN — MIRTAZAPINE 15 MG: 15 TABLET, FILM COATED ORAL at 20:16

## 2021-12-06 ASSESSMENT — ACTIVITIES OF DAILY LIVING (ADL)
HYGIENE/GROOMING: INDEPENDENT
ORAL_HYGIENE: INDEPENDENT
LAUNDRY: WITH SUPERVISION
DRESS: INDEPENDENT

## 2021-12-06 NOTE — PROGRESS NOTES
12/06/21 1307   Engagement   Intervention Group   Topic Detail OT [employer] group and pet therapy-[employer] decoupage boxes for social engagement, symptom management, insight development, creativity, following directions and healthy distraction   Attendance Attended   Patient Response Expressed feelings/issues;Accepted feedback;Needs reinforcement/repetition to learn materials;Was dismissive;Asked questions and/or took notes   Concentrated on Task duration of group   Cognition Restless;Attends to detail   Mood/Affect Anxious;Content   Social/Behavioral Cooperative;Engaged;Redirectable   Goals addressed in session today pt initially dismissive of group. pt endorsed positive response to pet therapy visit. pt chose a larger square box for his project. pt chose a green/blue color scheme for his box and used black tissue paper for the lid. pt was polite and appropriate for remainder of group.

## 2021-12-06 NOTE — TELEPHONE ENCOUNTER
[facility] form faxed to NEERU GARZON Attn Ayana @ [phone]. [facility] Sec number given for any additional questions.

## 2021-12-06 NOTE — TELEPHONE ENCOUNTER
Referral placed.  Previous medical referral form for restricted recipient printed.  Please coordinate with patient to see you exactly what he needs.    Becki CARVALHOC

## 2021-12-06 NOTE — PLAN OF CARE
Problem: Sleep Disturbance (Anxiety Signs/Symptoms)  Goal: Improved Sleep (Anxiety Signs/Symptoms)  Outcome: Improving     Problem: Behavioral Health Plan of Care  Goal: Plan of Care Review  Outcome: Improving   Pt is out with request for Hydroxyzine.  It is administered at 00:41 am along with Lavender essential oil.  Pt again retires to his room after these are provided.   Pt continues on SIB and assault precautions.  Slept poorly thru the night per pt report.  Awake at 6:30 am, requests and is given prn Tylenol 1000 mg po, Nicotine gum 2 mg po.  Awaiting Caffeine from pharmacy.

## 2021-12-06 NOTE — PLAN OF CARE
Problem: Activity and Energy Impairment (Anxiety Signs/Symptoms)  Goal: Optimized Energy Level (Anxiety Signs/Symptoms)  Outcome: Improving   Prakash expressed anxiety today, but stated that he knew he could use the breathing and progressive relaxation that this writer had worked with him on a couple of days ago, and stated that he had used it last night and that it had helped him to fall asleep. He was patient when he had to wait for medication, and was interactive, participated in groups. He appeared brighter.    Prakash stated that he hasn't been sleeping well but didn't nap today. He asked for zofran for nausea once and stated that it helped almost immediately. He didn't know what had caused the nausea.     Prakash stated that his prn anxiety medication brought his anxiety down from 10 to 8 and then 5.

## 2021-12-06 NOTE — PLAN OF CARE
Assessment/Intervention, Care Coordination and Current Symptoms:   CTC met with patient post morning IDT meeting. Patient was calm and pleasant upon approach. He rated his depression 0/10 and his anxiety  0/10. He has been without SIB for the past few days. Attending psychiatrist will removed patient's 1:1 if these positive behaviors continue. CTC attempted to meet with patient a second time to coordinate conference call with his CM, but writer was unable to meet with patient due to a [place] on PCU 55c. CTC left voice mail with patient's group home which was not returned by the time CTC posted note.        Discharge Plan or Goal:  Group Home ([facility] in [place], [place])     Barriers to Discharge: Commitment, symptom stabilization, medication management, coordination of care     Referral Status:    12/2/21 - [facility]. ( out-of-office until Monday, December 6) per patient request.      Legal Status:  commitment      Dom Carnes MA, [facility], 12/6/2021, 3:28 PM

## 2021-12-06 NOTE — PROGRESS NOTES
"PSYCHIATRY PROGRESS NOTE         DATE OF SERVICE:   12/6/2021         CHIEF COMPLAINT:     Impulsive, response to medications, asks to restart Vyvanse          OBJECTIVE:     Nursing reports : Compliant with groups and medications      reports working on outpatient referrals    Hospitalist consult by Star Mora APRN CNP on 12/2/2021  Elevated Blood Pressure without official hypertension diagnosis:  Elevated Blood Pressure without official hypertension diagnosis:  Dating back May 2021 per external facility problem list. Not on BP medication outpatient.  Amlodipine started during this admission. Increased Amlodipine dose 12/01  BP elevated prior to morning dose Amlodipine.   Recommend manual blood pressures for more accurate blood pressure readings.   PRN Hydralazine with parameters (inpatient use only)  Will need follow up outpatient after discharge.    # Self Inflicted Injury Right Inner Forearm:   # Puncture wound:  11/30  \" twisted pencil in healing scab area right inner forearm. Imaging negative for foreign body and fracture.  Keeps picking at sore. Cover with dressing. Encouraged not to pick skin.  Continue prophylaxis doxycycline. Oral probiotics to avoid abx associated diarrhea. Watch for new onset loose stool.          SUBJECTIVE:      Prakash says that he did not try to hurt himself over the weekend.  He says that he has no suicidal thoughts today.  He has no thoughts of hurting himself in any way today.  He is still on one-to-one, but he says he can contract for safety so one-to-one can be discontinued.  He says that hallucinations are improving.  He says that he has impulsivity without Vyvanse.  He says that he gets medications from the staff.  He does not have access to medications.  He says that last time when he was in [place] he picked up medications from the pharmacy and the staff found those medications in his room.  He says that the  called the " pharmacy and notify them not to give medications to the patient, and to continue to deliver it directly to the pharmacy.  He says that in the last 2 years medications have been given to him by the staff.  He says that is why his psychiatrist agreed to prescribe controlled substances.  He says that anxiety is decreased now that he knows that group home will take him back.  He says that he was really anxious about losing place to live.  He says that he has had stable living situation in the last 2 years.  He says he slept better.  Appetite fluctuates.  Energy and concentration fluctuate.  He says that he tries to do puzzle, but he starts and then he stops because he cannot focus because he is not on Vyvanse.  He says that he understands that he can lose privilege to be on controlled substances if he does not play by the rules of the group home.  Blood pressure has improved on amlodipine.  He was evaluated by hospitalist on December 2.  Wounds from self inflicted pencil injuries are healing.  He attends groups.  No altercations with staff or patients.  He denies other medical problems.  .       MEDICATIONS:       - Psychiatric Emergency -   Does not apply See Admin Instructions     amLODIPine  10 mg Oral Daily     clindamycin   Topical BID     escitalopram  10 mg Oral Daily     fluPHENAZine decanoate  25 mg Intramuscular Q14 Days     gabapentin  1,200 mg Oral Daily     gabapentin  900 mg Oral BID     lactobacillus rhamnosus (GG)  1 capsule Oral BID     lisdexamfetamine  20 mg Oral Daily     lithium ER  300 mg Oral QAM     lithium ER  900 mg Oral At Bedtime     mirtazapine  15 mg Oral At Bedtime     nicotine  1 patch Transdermal Daily     nicotine   Transdermal Q8H     pantoprazole  40 mg Oral Daily     testosterone cypionate  0.2 mL Subcutaneous Q7 Days     ziprasidone  20 mg Oral BID w/meals     caffeine **AND** acetaminophen, alum & mag hydroxide-simethicone, clonazePAM, hydrALAZINE, hydrOXYzine, nicotine, OLANZapine  "**OR** OLANZapine, ondansetron, polyethylene glycol    Medication adherence: Yes  Medication side effects: No  Benefit: Symptom reduction         ROS:   As per history of present illness, otherwise reminder of review of systems is negative for: General, eyes, ears, nose, throat, neck, respiratory, cardiovascular, gastrointestinal, genitourinary, meniscal skeletal, neurological, hematological, dermatological and endocrine system.         MENTAL STATUS EXAM:   BP (!) 145/84   Pulse 94   Temp 97.9  F (36.6  C) (Oral)   Resp 16   Ht 1.651 m (5' 5\")   Wt 104.9 kg (231 lb 3.2 oz)   SpO2 99%   BMI 38.47 kg/m      Appearance:fair hygiene, cooperative  Orientation: Alert and oriented x3  Speech: Normal in rate and tone  Language ability: Normal syntax and vocabulary  Thought process: concrete  Thought content: Decreasing delusions and hallucinations  Associations: Connected  Suicidal Ideation: Denies  Homicidal Ideation: Denies  Mood: Depressed  Affect: Less anxious  Intellectual functioning:average  Fund of Knowledge: Consistent with education and experience  Attention/Concentration: decreased  Memory: intact  Psychomotor Behavior:  Less agitated  Muscle Strength and Tone: no atrophy or involuntary movement  Gait and Station: steady  Insight and judgement: Impaired, going through revocation of provisional discharge, on extended commitment since 2019          LABS:   personally reviewed.     11/26/2021  COVID-19 test negative  Lithium level 0.9  TSH 4.95  Free T4 0.87  Pregnancy test negative  Creatinine 0.78    Lab Results   Component Value Date     11/26/2021     10/06/2021     05/19/2021     01/10/2021     12/17/2020    CO2 26 11/26/2021    CO2 21 10/06/2021    CO2 19 05/19/2021    CO2 21 01/10/2021    CO2 19 12/17/2020    BUN 16 11/26/2021    BUN 12 10/06/2021    BUN 11 05/19/2021    BUN 13 01/10/2021    BUN 11 12/17/2020     No results found for: CKTOTAL, CKMB, TROPONINI  Lab Results "   Component Value Date    WBC 12.6 11/26/2021    WBC 12.4 10/06/2021    WBC 13.1 05/19/2021    WBC 12.0 01/10/2021    WBC 12.4 12/15/2020    HGB 14.0 11/26/2021    HGB 13.1 10/06/2021    HGB 13.6 05/19/2021    HGB 12.2 03/01/2021    HGB 13.0 01/10/2021    HCT 42.6 11/26/2021    HCT 40.8 10/06/2021    HCT 41.6 05/19/2021    HCT 39.8 01/10/2021    HCT 38.9 12/15/2020    MCV 81 11/26/2021    MCV 82 10/06/2021    MCV 84 05/19/2021    MCV 83 01/10/2021    MCV 85 12/15/2020     11/26/2021     10/06/2021     05/19/2021     01/10/2021     12/18/2020     Lab Results   Component Value Date    CHOL 181 04/28/2021    CHOL 188 03/01/2021    CHOL 230 10/23/2019    TRIG 317 04/28/2021    TRIG 358 03/01/2021    TRIG 211 10/23/2019    HDL 37 04/28/2021    HDL 66 03/01/2021    HDL 58 10/23/2019     ECG on 11/30/2021  Systolic Blood Pressure mmHg       Diastolic Blood Pressure mmHg      Ventricular Rate BPM 76      Atrial Rate BPM 76      DC Interval ms 158      QRS Duration ms 98      QT ms 390      QTc ms 438      P Axis degrees 31      R AXIS degrees 25      T Axis degrees 27      Interpretation ECG  Sinus rhythm   Normal ECG        Ref. Range 11/26/2021 23:03 11/27/2021 17:27 12/1/2021 13:41   Sodium Latest Ref Range: 133 - 144 mmol/L 137     Potassium Latest Ref Range: 3.4 - 5.3 mmol/L 3.6     Chloride Latest Ref Range: 94 - 109 mmol/L 105     Carbon Dioxide Latest Ref Range: 20 - 32 mmol/L 26     Urea Nitrogen Latest Ref Range: 7 - 30 mg/dL 16     Creatinine Latest Ref Range: 0.52 - 1.25 mg/dL 0.78     GFR Estimate Latest Ref Range: >60 mL/min/1.73m2 >90     Calcium Latest Ref Range: 8.5 - 10.1 mg/dL 10.1     Anion Gap Latest Ref Range: 3 - 14 mmol/L 6     HCG Qual Urine Latest Ref Range: Negative   Negative    T4 Free Latest Units: ng/dL 0.87     TSH Latest Ref Range: 0.40 - 4.00 mU/L 4.95 (H)     Glucose Latest Ref Range: 70 - 99 mg/dL 93     GLUCOSE BY METER POCT Latest Ref Range: 70 - 99  mg/dL   157 (H)   WBC Latest Ref Range: 4.0 - 11.0 10e3/uL 12.6 (H)     Hemoglobin Latest Ref Range: 11.7 - 17.7 g/dL 14.0     Hematocrit Latest Ref Range: 35.0 - 53.0 % 42.6     Platelet Count Latest Ref Range: 150 - 450 10e3/uL 358     RBC Count Latest Ref Range: 3.80 - 5.90 10e6/uL 5.27     MCV Latest Ref Range: 78 - 100 fL 81     MCH Latest Ref Range: 26.5 - 33.0 pg 26.6     MCHC Latest Ref Range: 31.5 - 36.5 g/dL 32.9     RDW Latest Ref Range: 10.0 - 15.0 % 14.9     % Neutrophils Latest Units: % 67     % Lymphocytes Latest Units: % 25     % Monocytes Latest Units: % 5     % Eosinophils Latest Units: % 2     % Basophils Latest Units: % 1     Absolute Basophils Latest Ref Range: 0.0 - 0.2 10e3/uL 0.1     Absolute Eosinophils Latest Ref Range: 0.0 - 0.7 10e3/uL 0.2     Absolute Immature Granulocytes Latest Ref Range: <=0.0 10e3/uL 0.0     Absolute Lymphocytes Latest Ref Range: 0.8 - 5.3 10e3/uL 3.2     Absolute Monocytes Latest Ref Range: 0.0 - 1.3 10e3/uL 0.7     % Immature Granulocytes Latest Units: % 0     Absolute Neutrophils Latest Ref Range: 1.6 - 8.3 10e3/uL 8.4 (H)     Absolute NRBCs Latest Units: 10e3/uL 0.0     NRBCs per 100 WBC Latest Ref Range: <1 /100 0     SARS CoV2 PCR Latest Ref Range: Negative  Negative     Lithium Level Latest Ref Range:   mmol/L 0.9       Recent Results (from the past 24 hour(s))   Asymptomatic COVID-19 Virus (Coronavirus) by PCR Nasopharyngeal    Collection Time: 12/05/21  7:41 PM    Specimen: Nasopharyngeal; Swab   Result Value Ref Range    SARS CoV2 PCR Negative Negative            DIAGNOSIS:     Schizoaffective disorder chronic with acute exacerbation  Generalized anxiety disorder  Posttraumatic stress disorder  ADHD combined type  Borderline personality disorder  Marijuana dependency    Patient Active Problem List   Diagnosis     ADHD (attention deficit hyperactivity disorder)     Bipolar 1 disorder, manic, mild     Marijuana abuse     Polysubstance abuse (H)     GERD  (gastroesophageal reflux disease)     Tobacco abuse     Abdominal pain, right upper quadrant     Intractable back pain     Optic neuritis     Cauda equina syndrome with neurogenic bladder (H)     Schizoaffective disorder, bipolar type (H)     PTSD (post-traumatic stress disorder)     Anxiety     Auditory hallucination     Class 2 obesity due to excess calories in adult     Nephrolithiasis     Cyst of left ovary     Borderline personality disorder (H)     Cannabis dependence (H)     Depression     Episodic mood disorder (H)     History of heroin abuse (H)     Moderate episode of recurrent major depressive disorder (H)     Opioid use disorder, severe, dependence (H)     Substance-induced psychotic disorder with hallucinations (H)     Nausea     Overdose     Suicidal ideation     Bella (H)     Spell of altered consciousness     Urinary retention     Obesity (BMI 35.0-39.9) with comorbidity (H)     Chronic bilateral low back pain without sciatica     AVA (generalized anxiety disorder)     Aggressive behavior     Gender identity disorder     Lumbosacral radiculopathy at L5     DUB (dysfunctional uterine bleeding)     Seizure-like activity (H)     Elevated blood pressure reading without diagnosis of hypertension     Acanthosis nigricans     Viral gastroenteritis     Prediabetes     Schizoaffective disorder, chronic condition with acute exacerbation (H)     Bipolar affective disorder, mixed, severe, with psychotic behavior (H)     Schizophrenia, schizoaffective, chronic with acute exacerbation (H)          PLAN:   Patient says that hallucinations are improving on Geodon.  He is under extended commitment since 2019.  Provisional discharge was revoked.  He asks to restart Vyvanse for ADHD.  We discussed diagnosis and treatment plan and patient agrees with the following recommendations:    Medications:  Increase Geodon 40 mg twice daily with food for mood and hallucinations  Start Vyvanse 20 mg daily for ADHD  Remeron 15 mg  nightly for sleep  Klonopin 1 mg twice daily as needed for anxiety  Prolixin Decanoate 25 mg IM every 14 days, given on 12/1/2021  Lithobid 300 mg every morning and 900 mg nightly  Neurontin 900 mg twice daily and 1200 mg midday  Lexapro 10 mg daily  Nicotine patch 21 mg daily  Continue nonpsychiatric medications:  Amlodipine 10 mg daily  Doxycycline 100 mg twice daily for 5 days completed  Protonix 40 mg daily  Depo testosterone 40 mg every 7 days, given on 12/1/2021  We discussed side effects, benefits and alternative treatments and patient agrees with capacity to do so.  Rule 25 for chemical dependency treatment   will collect collateral information and make outpatient referrals  Staff to provide emotional support and redirect as needed  Patient encouraged to attend groups  Lab results: Reviewed personally  Consultation: Hospitalist consult completed  Discontinue one-to-one    Risk Assessment: Auditory hallucinations telling him to kill himself, going for revocation of provisional discharge    Coordination of Care:   Patient seen, medical record reviewed, care coordinated with the team.    Total time: More than 35 minutes spent on this visit with more than 50% of time spent in coordination of care with staff, psychoeducation of the patient about diagnosis, prognosis, risk of inappropriate use of controlled substances, diet, exercise, abstinence from drugs and alcohol and provided supportive therapy regarding above symptoms.    This document is created with the help of Dragon dictation system.  All grammatical/typing errors or context distortion are unintentional and inherent to software.    Gillian Andrade MD        Re-Certification I certify that the inpatient psychiatric facility services furnished since the previous certification were, and continue to be, medically necessary for, either, treatment which could reasonably be expected to improve the patient s condition or diagnostic study and that the  hospital records indicate that the services furnished were, either, intensive treatment services, admission and related services necessary for diagnostic study, or equivalent services.     I certify that the patient continues to need, on a daily basis, active treatment furnished directly by or requiring the supervision of inpatient psychiatric facility personnel.   I estimate TBD days of hospitalization is necessary for proper treatment of the patient. My plans for post-hospital care for this patient are : Medications, appointments     Gillian Andrade MD

## 2021-12-06 NOTE — PLAN OF CARE
BEHAVIORAL TEAM DISCUSSION    Participants: RN: Sirisha MUJICA CTC: Dom BARNEY Provider: Gillian MCDONNELL MD, OT: Shona MUJICA Psychology: Michelle MUJICA PsyD, Pharmacy: [facility]   Progress: Improving, 1:1 removed  Anticipated length of stay: 1 week  Continued Stay Criteria/Rationale: commitment, symptom stabilization, medication management  Medical/Physical: Headaches (nursing addressing)  Precautions: None reported  Behavioral Orders   Procedures    Assault precautions    Code 1 - Restrict to Unit    Routine Programming     As clinically indicated    Self Injury Precaution    Status 15     Every 15 minutes.    Status Individual Observation     Pt stabbed self with pencil on right forearm. Pt will not contract for safety.       Okay on day and evening only if able to have patient in a camera room for continuous monitoring for self-injurious behavior at night     Order Specific Question:   CONTINUOUS 24 hours / day     Answer:   1 foot     Order Specific Question:   Indications for SIO     Answer:   Self-injury risk     Plan: Return to group home .  Rationale for change in precautions or plan: N/A

## 2021-12-07 ENCOUNTER — TELEPHONE (OUTPATIENT)
Dept: FAMILY MEDICINE | Facility: CLINIC | Age: 31
End: 2021-12-07
Payer: COMMERCIAL

## 2021-12-07 DIAGNOSIS — F31.11 BIPOLAR 1 DISORDER, MANIC, MILD (H): ICD-10-CM

## 2021-12-07 DIAGNOSIS — F41.9 ANXIETY: ICD-10-CM

## 2021-12-07 DIAGNOSIS — M54.9 INTRACTABLE BACK PAIN: ICD-10-CM

## 2021-12-07 PROCEDURE — G0008 ADMIN INFLUENZA VIRUS VAC: HCPCS | Performed by: PSYCHIATRY & NEUROLOGY

## 2021-12-07 PROCEDURE — 90686 IIV4 VACC NO PRSV 0.5 ML IM: CPT | Performed by: PSYCHIATRY & NEUROLOGY

## 2021-12-07 PROCEDURE — 250N000013 HC RX MED GY IP 250 OP 250 PS 637: Performed by: PSYCHIATRY & NEUROLOGY

## 2021-12-07 PROCEDURE — 250N000013 HC RX MED GY IP 250 OP 250 PS 637: Performed by: NURSE PRACTITIONER

## 2021-12-07 PROCEDURE — 128N000001 HC R&B CD/MH ADULT

## 2021-12-07 PROCEDURE — 99233 SBSQ HOSP IP/OBS HIGH 50: CPT | Performed by: NURSE PRACTITIONER

## 2021-12-07 PROCEDURE — 250N000011 HC RX IP 250 OP 636: Performed by: PSYCHIATRY & NEUROLOGY

## 2021-12-07 PROCEDURE — 90853 GROUP PSYCHOTHERAPY: CPT

## 2021-12-07 PROCEDURE — 99232 SBSQ HOSP IP/OBS MODERATE 35: CPT | Performed by: PSYCHIATRY & NEUROLOGY

## 2021-12-07 RX ADMIN — NICOTINE POLACRILEX 2 MG: 2 GUM, CHEWING BUCCAL at 19:06

## 2021-12-07 RX ADMIN — PANTOPRAZOLE SODIUM 40 MG: 40 TABLET, DELAYED RELEASE ORAL at 08:32

## 2021-12-07 RX ADMIN — CLONAZEPAM 1 MG: 1 TABLET ORAL at 08:44

## 2021-12-07 RX ADMIN — GABAPENTIN 1200 MG: 400 CAPSULE ORAL at 13:50

## 2021-12-07 RX ADMIN — INFLUENZA A VIRUS A/VICTORIA/2570/2019 IVR-215 (H1N1) ANTIGEN (FORMALDEHYDE INACTIVATED), INFLUENZA A VIRUS A/TASMANIA/503/2020 IVR-221 (H3N2) ANTIGEN (FORMALDEHYDE INACTIVATED), INFLUENZA B VIRUS B/PHUKET/3073/2013 ANTIGEN (FORMALDEHYDE INACTIVATED), AND INFLUENZA B VIRUS B/WASHINGTON/02/2019 ANTIGEN (FORMALDEHYDE INACTIVATED) 0.5 ML: 15; 15; 15; 15 INJECTION, SUSPENSION INTRAMUSCULAR at 09:36

## 2021-12-07 RX ADMIN — ESCITALOPRAM OXALATE 10 MG: 10 TABLET ORAL at 08:32

## 2021-12-07 RX ADMIN — Medication 1 CAPSULE: at 19:31

## 2021-12-07 RX ADMIN — ONDANSETRON 4 MG: 4 TABLET, ORALLY DISINTEGRATING ORAL at 11:34

## 2021-12-07 RX ADMIN — GABAPENTIN 900 MG: 300 CAPSULE ORAL at 20:16

## 2021-12-07 RX ADMIN — ZIPRASIDONE HCL 40 MG: 40 CAPSULE ORAL at 17:38

## 2021-12-07 RX ADMIN — NICOTINE POLACRILEX 2 MG: 2 GUM, CHEWING BUCCAL at 17:00

## 2021-12-07 RX ADMIN — Medication 1 PATCH: at 08:30

## 2021-12-07 RX ADMIN — LITHIUM CARBONATE 900 MG: 300 TABLET, FILM COATED, EXTENDED RELEASE ORAL at 20:16

## 2021-12-07 RX ADMIN — LISDEXAMFETAMINE DIMESYLATE 20 MG: 20 CAPSULE ORAL at 08:32

## 2021-12-07 RX ADMIN — HYDRALAZINE HYDROCHLORIDE 10 MG: 10 TABLET, FILM COATED ORAL at 20:17

## 2021-12-07 RX ADMIN — Medication 1 CAPSULE: at 08:32

## 2021-12-07 RX ADMIN — METOPROLOL SUCCINATE 12.5 MG: 25 TABLET, EXTENDED RELEASE ORAL at 11:45

## 2021-12-07 RX ADMIN — GABAPENTIN 900 MG: 300 CAPSULE ORAL at 08:37

## 2021-12-07 RX ADMIN — NICOTINE POLACRILEX 2 MG: 2 GUM, CHEWING BUCCAL at 08:08

## 2021-12-07 RX ADMIN — OLANZAPINE 10 MG: 10 INJECTION, POWDER, FOR SOLUTION INTRAMUSCULAR at 19:26

## 2021-12-07 RX ADMIN — LITHIUM CARBONATE 300 MG: 300 TABLET, FILM COATED, EXTENDED RELEASE ORAL at 08:32

## 2021-12-07 RX ADMIN — MIRTAZAPINE 15 MG: 15 TABLET, FILM COATED ORAL at 20:17

## 2021-12-07 RX ADMIN — CLINDAMYCIN PHOSPHATE: 10 GEL TOPICAL at 20:15

## 2021-12-07 RX ADMIN — CLONAZEPAM 1 MG: 1 TABLET ORAL at 15:43

## 2021-12-07 RX ADMIN — HYDROXYZINE HYDROCHLORIDE 25 MG: 25 TABLET, FILM COATED ORAL at 11:34

## 2021-12-07 RX ADMIN — NICOTINE POLACRILEX 2 MG: 2 GUM, CHEWING BUCCAL at 14:43

## 2021-12-07 RX ADMIN — CLINDAMYCIN PHOSPHATE: 10 GEL TOPICAL at 08:33

## 2021-12-07 RX ADMIN — AMLODIPINE BESYLATE 10 MG: 10 TABLET ORAL at 08:32

## 2021-12-07 RX ADMIN — ZIPRASIDONE HCL 40 MG: 40 CAPSULE ORAL at 08:31

## 2021-12-07 RX ADMIN — NICOTINE POLACRILEX 2 MG: 2 GUM, CHEWING BUCCAL at 09:32

## 2021-12-07 RX ADMIN — NICOTINE POLACRILEX 2 MG: 2 GUM, CHEWING BUCCAL at 11:09

## 2021-12-07 RX ADMIN — HYDROXYZINE HYDROCHLORIDE 25 MG: 25 TABLET, FILM COATED ORAL at 19:26

## 2021-12-07 ASSESSMENT — ACTIVITIES OF DAILY LIVING (ADL)
DRESS: INDEPENDENT
ORAL_HYGIENE: INDEPENDENT
HYGIENE/GROOMING: INDEPENDENT
ORAL_HYGIENE: INDEPENDENT
HYGIENE/GROOMING: INDEPENDENT
DRESS: INDEPENDENT
LAUNDRY: WITH SUPERVISION

## 2021-12-07 ASSESSMENT — MIFFLIN-ST. JEOR: SCORE: 1773.21

## 2021-12-07 NOTE — PLAN OF CARE
"  Problem: Suicidal Behavior  Goal: Suicidal Behavior is Absent or Managed  Outcome: Improving     Problem: Activity and Energy Impairment (Anxiety Signs/Symptoms)  Goal: Optimized Energy Level (Anxiety Signs/Symptoms)  Outcome: Improving    Patient is pleasant and cooperative with assessment. He rated anxiety 8/10. He denied of depression, SI, HI and pain. He endorsed auditory hallucination, stated \" the voice is more quiet today.\" he was meds compliant. Klonopin given at 0844 for anxiety. Patient has a lump on his right arm. Hospitalist saw patient. New order for U/S for right arm. Patient's bp was 156/111, p 106. Scheduled bp med was given.  Patient is transferred to AB side at 1030. Report given AB RN. Explained meds and care plan to patient.     "

## 2021-12-07 NOTE — PROGRESS NOTES
Pt transferred from 5500 C at 1045. Pt was alert and oriented times 3. Speech and thoughts were clear and organized. Pt was oriented to his room and unit. Pt attended groups. Pt complained nausea and Zofran given and effective. Vistaril given for anxiety 8/10 and was effective. Pt was started on Metoprolol for High B/p and was effective. B/P 125/80/

## 2021-12-07 NOTE — PROGRESS NOTES
12/07/21 1105   Engagement   Intervention Group   Topic Detail OT Creative Expressions group-holiday ornaments for creativity, healthy distraction, social engagement, symptom management and following directions   Attendance Attended   Patient Response Accepted feedback;Asked questions and/or took notes;Expressed feelings/issues;Demonstrated understanding of materials provided   Concentrated on Task 30 - 45 min   Cognition Attends to detail   Mood/Affect Anxious;Pleasant   Social/Behavioral Cooperative;Engaged   Goals addressed in session today pt chose to participate in coloring with colored pencils during group time. pt was polite and appropriate during interactions with staff and peers.pt moving to AB unit at end of group.

## 2021-12-07 NOTE — PROGRESS NOTES
"PSYCHIATRY PROGRESS NOTE         DATE OF SERVICE:   12/7/2021         CHIEF COMPLAINT:     Transferred to AB unit insists on needing more Vyvanse          OBJECTIVE:     Nursing reports : Compliant with groups and medications      reports working on outpatient referrals    Hospitalist consult by Star Mora APRN CNP on 12/2/2021  Elevated Blood Pressure without official hypertension diagnosis:  Elevated Blood Pressure without official hypertension diagnosis:  Dating back May 2021 per external facility problem list. Not on BP medication outpatient.  Amlodipine started during this admission. Increased Amlodipine dose 12/01  BP elevated prior to morning dose Amlodipine.   Recommend manual blood pressures for more accurate blood pressure readings.   PRN Hydralazine with parameters (inpatient use only)  Will need follow up outpatient after discharge.    # Self Inflicted Injury Right Inner Forearm:   # Puncture wound:  11/30  \" twisted pencil in healing scab area right inner forearm. Imaging negative for foreign body and fracture.  Keeps picking at sore. Cover with dressing. Encouraged not to pick skin.  Continue prophylaxis doxycycline. Oral probiotics to avoid abx associated diarrhea. Watch for new onset loose stool.          SUBJECTIVE:      Prakash says that he prefers AB side over  C side.  He says minimal activities.  He says he relates well to staff and patients.  He was seen by hospitalist for growth in the right inner arm above the elbow.  He will have ultrasound.  He says it is painful.  He denies suicidal and homicidal ideas now.  He denies delusions and hallucinations.  He says that he was on higher dose of Vyvanse.  He says when he was on Adderall he was taking it twice a day.  I informed him that Vyvanse was scheduled once a day 50 mg before admission.  He says that he would rather take it 20 mg twice a day in the morning and in the afternoon.  He is going to groups.  He is " visible on the unit.  No altercations with staff or patients.  He says that he would like to go to a different group home because the group home where he used to live is not done well.  He says that  is looking at the options.   notified me that he will schedule care conference with patient's group home on December 8 at 2:30 PM.   has not tried to refer patient to a different group home.  If patient wants to go not, patient can discuss it with outpatient , but the plan is that patient will return to his present group home.  .       MEDICATIONS:       - Psychiatric Emergency -   Does not apply See Admin Instructions     amLODIPine  10 mg Oral Daily     clindamycin   Topical BID     escitalopram  10 mg Oral Daily     fluPHENAZine decanoate  25 mg Intramuscular Q14 Days     gabapentin  1,200 mg Oral Daily     gabapentin  900 mg Oral BID     lactobacillus rhamnosus (GG)  1 capsule Oral BID     lisdexamfetamine  20 mg Oral Daily     lithium ER  300 mg Oral QAM     lithium ER  900 mg Oral At Bedtime     metoprolol succinate ER  12.5 mg Oral Daily     mirtazapine  15 mg Oral At Bedtime     nicotine  1 patch Transdermal Daily     nicotine   Transdermal Q8H     pantoprazole  40 mg Oral Daily     testosterone cypionate  0.2 mL Subcutaneous Q7 Days     ziprasidone  40 mg Oral BID w/meals     caffeine **AND** acetaminophen, alum & mag hydroxide-simethicone, clonazePAM, hydrALAZINE, hydrOXYzine, nicotine, OLANZapine **OR** OLANZapine, ondansetron, polyethylene glycol    Medication adherence: Yes  Medication side effects: No  Benefit: Symptom reduction         ROS:   As per history of present illness, otherwise reminder of review of systems is negative for: General, eyes, ears, nose, throat, neck, respiratory, cardiovascular, gastrointestinal, genitourinary, meniscal skeletal, neurological, hematological, dermatological and endocrine system.         MENTAL STATUS EXAM:   BP  "125/80 (BP Location: Left arm, Patient Position: Sitting)   Pulse 106   Temp 97.9  F (36.6  C) (Oral)   Resp 18   Ht 1.651 m (5' 5\")   Wt 105.7 kg (233 lb 1.6 oz)   SpO2 98%   BMI 38.79 kg/m      Appearance:fair hygiene, cooperative  Orientation: Alert and oriented x3  Speech: Normal in rate and tone  Language ability: Normal syntax and vocabulary  Thought process: concrete  Thought content: Decreasing delusions and hallucinations  Associations: Connected  Suicidal Ideation: Denies  Homicidal Ideation: Denies  Mood: Depressed  Affect: Anxious  Intellectual functioning:average  Fund of Knowledge: Consistent with education and experience  Attention/Concentration: decreased  Memory: intact  Psychomotor Behavior:  Less agitated  Muscle Strength and Tone: no atrophy or involuntary movement  Gait and Station: steady  Insight and judgement: Impaired, going through revocation of provisional discharge, on extended commitment since 2019          LABS:   personally reviewed.     11/26/2021  COVID-19 test negative  Lithium level 0.9  TSH 4.95  Free T4 0.87  Pregnancy test negative  Creatinine 0.78    Lab Results   Component Value Date     11/26/2021     10/06/2021     05/19/2021     01/10/2021     12/17/2020    CO2 26 11/26/2021    CO2 21 10/06/2021    CO2 19 05/19/2021    CO2 21 01/10/2021    CO2 19 12/17/2020    BUN 16 11/26/2021    BUN 12 10/06/2021    BUN 11 05/19/2021    BUN 13 01/10/2021    BUN 11 12/17/2020     No results found for: CKTOTAL, CKMB, TROPONINI  Lab Results   Component Value Date    WBC 12.6 11/26/2021    WBC 12.4 10/06/2021    WBC 13.1 05/19/2021    WBC 12.0 01/10/2021    WBC 12.4 12/15/2020    HGB 14.0 11/26/2021    HGB 13.1 10/06/2021    HGB 13.6 05/19/2021    HGB 12.2 03/01/2021    HGB 13.0 01/10/2021    HCT 42.6 11/26/2021    HCT 40.8 10/06/2021    HCT 41.6 05/19/2021    HCT 39.8 01/10/2021    HCT 38.9 12/15/2020    MCV 81 11/26/2021    MCV 82 10/06/2021    MCV 84 " 05/19/2021    MCV 83 01/10/2021    MCV 85 12/15/2020     11/26/2021     10/06/2021     05/19/2021     01/10/2021     12/18/2020     Lab Results   Component Value Date    CHOL 181 04/28/2021    CHOL 188 03/01/2021    CHOL 230 10/23/2019    TRIG 317 04/28/2021    TRIG 358 03/01/2021    TRIG 211 10/23/2019    HDL 37 04/28/2021    HDL 66 03/01/2021    HDL 58 10/23/2019     ECG on 11/30/2021  Systolic Blood Pressure mmHg       Diastolic Blood Pressure mmHg      Ventricular Rate BPM 76      Atrial Rate BPM 76      CT Interval ms 158      QRS Duration ms 98      QT ms 390      QTc ms 438      P Axis degrees 31      R AXIS degrees 25      T Axis degrees 27      Interpretation ECG  Sinus rhythm   Normal ECG        Ref. Range 11/26/2021 23:03 11/27/2021 17:27 12/1/2021 13:41   Sodium Latest Ref Range: 133 - 144 mmol/L 137     Potassium Latest Ref Range: 3.4 - 5.3 mmol/L 3.6     Chloride Latest Ref Range: 94 - 109 mmol/L 105     Carbon Dioxide Latest Ref Range: 20 - 32 mmol/L 26     Urea Nitrogen Latest Ref Range: 7 - 30 mg/dL 16     Creatinine Latest Ref Range: 0.52 - 1.25 mg/dL 0.78     GFR Estimate Latest Ref Range: >60 mL/min/1.73m2 >90     Calcium Latest Ref Range: 8.5 - 10.1 mg/dL 10.1     Anion Gap Latest Ref Range: 3 - 14 mmol/L 6     HCG Qual Urine Latest Ref Range: Negative   Negative    T4 Free Latest Units: ng/dL 0.87     TSH Latest Ref Range: 0.40 - 4.00 mU/L 4.95 (H)     Glucose Latest Ref Range: 70 - 99 mg/dL 93     GLUCOSE BY METER POCT Latest Ref Range: 70 - 99 mg/dL   157 (H)   WBC Latest Ref Range: 4.0 - 11.0 10e3/uL 12.6 (H)     Hemoglobin Latest Ref Range: 11.7 - 17.7 g/dL 14.0     Hematocrit Latest Ref Range: 35.0 - 53.0 % 42.6     Platelet Count Latest Ref Range: 150 - 450 10e3/uL 358     RBC Count Latest Ref Range: 3.80 - 5.90 10e6/uL 5.27     MCV Latest Ref Range: 78 - 100 fL 81     MCH Latest Ref Range: 26.5 - 33.0 pg 26.6     MCHC Latest Ref Range: 31.5 - 36.5 g/dL  32.9     RDW Latest Ref Range: 10.0 - 15.0 % 14.9     % Neutrophils Latest Units: % 67     % Lymphocytes Latest Units: % 25     % Monocytes Latest Units: % 5     % Eosinophils Latest Units: % 2     % Basophils Latest Units: % 1     Absolute Basophils Latest Ref Range: 0.0 - 0.2 10e3/uL 0.1     Absolute Eosinophils Latest Ref Range: 0.0 - 0.7 10e3/uL 0.2     Absolute Immature Granulocytes Latest Ref Range: <=0.0 10e3/uL 0.0     Absolute Lymphocytes Latest Ref Range: 0.8 - 5.3 10e3/uL 3.2     Absolute Monocytes Latest Ref Range: 0.0 - 1.3 10e3/uL 0.7     % Immature Granulocytes Latest Units: % 0     Absolute Neutrophils Latest Ref Range: 1.6 - 8.3 10e3/uL 8.4 (H)     Absolute NRBCs Latest Units: 10e3/uL 0.0     NRBCs per 100 WBC Latest Ref Range: <1 /100 0     SARS CoV2 PCR Latest Ref Range: Negative  Negative     Lithium Level Latest Ref Range:   mmol/L 0.9       No results found for this or any previous visit (from the past 24 hour(s)).         DIAGNOSIS:     Schizoaffective disorder chronic with acute exacerbation  Generalized anxiety disorder  Posttraumatic stress disorder  ADHD combined type  Borderline personality disorder  Marijuana dependency    Patient Active Problem List   Diagnosis     ADHD (attention deficit hyperactivity disorder)     Bipolar 1 disorder, manic, mild     Marijuana abuse     Polysubstance abuse (H)     GERD (gastroesophageal reflux disease)     Tobacco abuse     Abdominal pain, right upper quadrant     Intractable back pain     Optic neuritis     Cauda equina syndrome with neurogenic bladder (H)     Schizoaffective disorder, bipolar type (H)     PTSD (post-traumatic stress disorder)     Anxiety     Auditory hallucination     Class 2 obesity due to excess calories in adult     Nephrolithiasis     Cyst of left ovary     Borderline personality disorder (H)     Cannabis dependence (H)     Depression     Episodic mood disorder (H)     History of heroin abuse (H)     Moderate episode of recurrent  major depressive disorder (H)     Opioid use disorder, severe, dependence (H)     Substance-induced psychotic disorder with hallucinations (H)     Nausea     Overdose     Suicidal ideation     Bella (H)     Spell of altered consciousness     Urinary retention     Obesity (BMI 35.0-39.9) with comorbidity (H)     Chronic bilateral low back pain without sciatica     AVA (generalized anxiety disorder)     Aggressive behavior     Gender identity disorder     Lumbosacral radiculopathy at L5     DUB (dysfunctional uterine bleeding)     Seizure-like activity (H)     Elevated blood pressure reading without diagnosis of hypertension     Acanthosis nigricans     Viral gastroenteritis     Prediabetes     Schizoaffective disorder, chronic condition with acute exacerbation (H)     Bipolar affective disorder, mixed, severe, with psychotic behavior (H)     Schizophrenia, schizoaffective, chronic with acute exacerbation (H)          PLAN:   Patient says that hallucinations are improving on Geodon.  He is under extended commitment since 2019.  Provisional discharge was revoked.  He asks to restart Vyvanse for ADHD.  We discussed diagnosis and treatment plan and patient agrees with the following recommendations:    Medications:   Geodon 40 mg twice daily with food for mood and hallucinations   Vyvanse 20 mg daily for ADHD  Remeron 15 mg nightly for sleep  Klonopin 1 mg twice daily as needed for anxiety  Prolixin Decanoate 25 mg IM every 14 days, given on 12/1/2021  Lithobid 300 mg every morning and 900 mg nightly  Neurontin 900 mg twice daily and 1200 mg midday  Lexapro 10 mg daily  Nicotine patch 21 mg daily  Continue nonpsychiatric medications:  Amlodipine 10 mg daily  Doxycycline 100 mg twice daily for 5 days completed  Protonix 40 mg daily  Depo testosterone 40 mg every 7 days, given on 12/1/2021  We discussed side effects, benefits and alternative treatments and patient agrees with capacity to do so.  Rule 25 for chemical  dependency treatment   will collect collateral information and make outpatient referrals  Staff to provide emotional support and redirect as needed  Patient encouraged to attend groups  Lab results: Reviewed personally  Consultation: Hospitalist consult completed  Discontinue one-to-one    Risk Assessment: Off one-to-one, agrees not to hurt himself    Coordination of Care:   Patient seen, medical record reviewed, care coordinated with the team.    Total time: More than 25 minutes spent on this visit with more than 50% of time spent in coordination of care with staff, psychoeducation of the patient about diagnosis, prognosis, risk of inappropriate use of controlled substances, diet, exercise, abstinence from drugs and alcohol and provided supportive therapy regarding above symptoms.    This document is created with the help of Dragon dictation system.  All grammatical/typing errors or context distortion are unintentional and inherent to software.    Gillian Andrade MD        Re-Certification I certify that the inpatient psychiatric facility services furnished since the previous certification were, and continue to be, medically necessary for, either, treatment which could reasonably be expected to improve the patient s condition or diagnostic study and that the hospital records indicate that the services furnished were, either, intensive treatment services, admission and related services necessary for diagnostic study, or equivalent services.     I certify that the patient continues to need, on a daily basis, active treatment furnished directly by or requiring the supervision of inpatient psychiatric facility personnel.   I estimate TBD days of hospitalization is necessary for proper treatment of the patient. My plans for post-hospital care for this patient are : Medications, appointments     Gillian Andrade MD

## 2021-12-07 NOTE — PLAN OF CARE
"  Problem: Borderline behaviour  Goal: Social accomodation  Outcome: Improving   Unspecified unintelligible, non-distressing auditory hallucinations reported. No imperatives. Some chronic passive suicidal ideation ( no plan, no intent). No SIB and no urges reported.   Wishes to have a visit by his dog.  Was notified by a roommate at his  that his bank account was hacked. (How did roommate know?) Prakash may need more telephone/computer time to address this problem. He requested and received IM Zyprexa to deal with his anxiety. Considers Vistaril ineffective. Wants orders for more anti anxiety and more sleeping medication.     Concerned with his weight gain. Attributes his increased appetite to his medications.  New somatic complaint: unspecified \"Lump\" R arm. Expecting to be seen by a hospitalist today.    Referral to [facility] in progress.     Appeared to be sleeping uneventfully through the night  Continue to monitor.    Plan: Monitor and document mood and behaviour, thought process and content. Establish and maintain therapeutic relationship. Educate about diagnoses, medications, treatment, legal status, plan of care. Address preexisting and concurrent medical concerns.   "

## 2021-12-07 NOTE — CONSULTS
Hospital medicine service consulted by psychiatry to assess new growth right arm above elbow.    Patient reports onset painful lump right upper arm over the last 24 hours.  Patient is asking this provider to drain it.  Denies history of IV drug use.    Reviewed blood pressures high 140s to 150s and diastolic high 90s to 100.    Physical Exam:  Patient is alert, nonlabored breathing    Right Arm: above antecubital area medial aspect right arm: There is linear intact raised cyst-like area, same skin color, no surrounding erythema, similar to skin nodules left upper arm without scabbed area.  Distal pulses present    Left Arm: There are scattered scabbed nodules acne-cyst like presentation      Assessment and Plan:  # Skin Nodule Right Upper Arm: Not too sure what to make of this, suspect chronic skin issue given similar acne cyst-like presentation left upper arm.  I will obtain ultrasound make sure it is not an abscess.    # Primary Hypertension: Blood pressure still above goal.  Add metoprolol XL 12.5 mg.  Parameters provided.  Continue amlodipine.  Continue manual blood pressures.         BROOKLYN Salgado, CNP  Hospital Medicine Service  [facility], [facility]

## 2021-12-07 NOTE — TELEPHONE ENCOUNTER
Signature needed on admitted to hospital, order date 12/06/21.  To [facility] in basket for signature.

## 2021-12-07 NOTE — PLAN OF CARE
"  Problem: Suicidal Behavior  Goal: Suicidal Behavior is Absent or Managed  Outcome: Improving  Flowsheets (Taken 12/6/2021 2305)  Mutually Determined Action Steps (Suicidal Behavior Absent/Managed): verbalizes safety check rationale     Problem: Activity and Energy Impairment (Anxiety Signs/Symptoms)  Goal: Optimized Energy Level (Anxiety Signs/Symptoms)  Outcome: Improving  Flowsheets (Taken 12/6/2021 2305)  Mutually Determined Action Steps (Optimized Energy Level): grooms self without prompting  Intervention: Optimize Energy Level  Recent Flowsheet Documentation  Taken 12/6/2021 1720 by Casalenda, Gina R, RN  Diversional Activity:   television   music    Pt reports 8/10 anxiety - PRN hydroxyzine given per pt request. Pt denies depression. Pt denies SI and HI. Pt endorses auditory hallucinations. Pt reports auditory hallucinations are  \"quiet voices.\" Pt states they are very quiet today and he cannot hear what they are saying. He contracts for safety. He initially denied pain but approached writer around 1720 reporting he has a \"lump\" on his right arm that is new and painful. Writer spoke with on-call provider and a Hospitalist consult ordered. Pt aware that Hospitalist will see pt tomorrow. Pt requested PRN klonopin frequency be increased but provider stated no. Pt aware of this. Pt given handouts for schizophrenia and schizoaffective per request. Writer went through pt's medications with him and answered any questions he  had. Pt visible on unit attending dinner, watching television, listening to music, and socializing. Pt visible intermittently resting in room. He is cooperative. A/O. Pt will receive flu vaccine tomorrow morning per request. Pt is medication compliant. PRN nicotine gum given per request. Around 2100, pt received phone call from roommate who told pt his bank account was hacked. Writer gave pt a phone number to pt's bank for him to call. Pt reports his bank account was hacked and he will have to " call claims tomorrow. Writer left sticky  note to provider and treatment team since pt may need order to use phone or computer tomorrow to finish handling issue with his bank. Pt expressed increased anxiety due to someone hacking his account. Pt given PRN hydroxyzine per request. Pt reports hydroxyzine ineffective. Pt requested IM PRN zyprexa. Writer gave pt IM zyprexa per request in right deltoid. Pt requesting additional PRN anxiety medication be ordered, as well as an additional medication to help him sleep - sticky note left for provider regarding this. Pt requesting to transfer to AB side. Per provider, will reassess pt tomorrow to see if he is appropriate. Pt encouraged to attend AB groups tomorrow. Pt is on assault and SIB precautions - no behaviors noted this shift. Will continue to monitor and assist as needed.

## 2021-12-07 NOTE — PLAN OF CARE
Assessment/Intervention, Care Coordination and Current Symptoms:  [facility] met with patient to discuss his interest in participating in the care conference/call with his  and . Patient reported he remains in agreement with the conference call when date/time assigned. [facility] contacted patients FLORIDA Vero, to inquire about her availability for the care conference/call. She reported she would not have availability until tomorrow, Wednesday, December 8 post 2:30pm. [facility] also contacted patients , Domenica, who continues to report that the patient is welcomed to return to the group home post hospitalization. [facility] and group home staff had conversation about administration of patient's medications. It was reported that all patient medications are to given to staff and administered with staff present. Patients are not allowed to keep their medications in their personal space according to group [facility]. Patient denied new or worsening psych symptoms. As a result of improved behaviors and lack of SIB, patient was removed from a 1:1 and transferred to [facility].         Discharge Plan or Goal:  Return to group [facility] ([place], [place])     Barriers to Discharge:  symptom stabilization, medication management, coordination of care     Referral Status:  12/2/21 - [facility]. ( out-of-office until Monday, December 6) per patient request.      Legal Status:  commitment      Dom Carnes MA, [facility], 12/7/2021, 11:27 AM

## 2021-12-07 NOTE — PROGRESS NOTES
12/07/21 1524   Engagement   Intervention Group   Topic Detail OT: Day 2 Decop[employer] boxes and fuzzy posters for creative expression, building hope and coping with stress/sxs   Attendance Attended   Patient Response Was respectful;Positive attitude;Accepted feedback   Concentrated on Task 30 - 45 min   Cognition Goal-directed;Follows through with task   Mood/Affect Blunted;Content   Social/Behavioral Cooperative;Disengaged   Goals addressed in session today Pt joined group and introduced himself; pt shared that moving to AB from [place] was a positive part of his day; pt worked on sticker-by-number while peers worked on a Day 2 project; pt appeared content; did not initiate conversations with others but was pleasant upon approach.

## 2021-12-08 ENCOUNTER — APPOINTMENT (OUTPATIENT)
Dept: ULTRASOUND IMAGING | Facility: CLINIC | Age: 31
End: 2021-12-08
Attending: NURSE PRACTITIONER
Payer: COMMERCIAL

## 2021-12-08 LAB
ANION GAP SERPL CALCULATED.3IONS-SCNC: 9 MMOL/L (ref 5–18)
BASOPHILS # BLD AUTO: 0 10E3/UL (ref 0–0.2)
BASOPHILS NFR BLD AUTO: 0 %
BUN SERPL-MCNC: 14 MG/DL (ref 8–22)
CALCIUM SERPL-MCNC: 10.1 MG/DL (ref 8.5–10.5)
CHLORIDE BLD-SCNC: 107 MMOL/L (ref 98–107)
CO2 SERPL-SCNC: 24 MMOL/L (ref 22–31)
CREAT SERPL-MCNC: 0.74 MG/DL (ref 0.6–1.3)
EOSINOPHIL # BLD AUTO: 0.3 10E3/UL (ref 0–0.7)
EOSINOPHIL NFR BLD AUTO: 3 %
ERYTHROCYTE [DISTWIDTH] IN BLOOD BY AUTOMATED COUNT: 14.7 % (ref 10–15)
GFR SERPL CREATININE-BSD FRML MDRD: >90 ML/MIN/1.73M2
GLUCOSE BLD-MCNC: 83 MG/DL (ref 70–125)
HCT VFR BLD AUTO: 42.4 % (ref 35–53)
HGB BLD-MCNC: 13.8 G/DL (ref 11.7–17.7)
IMM GRANULOCYTES # BLD: 0 10E3/UL
IMM GRANULOCYTES NFR BLD: 0 %
LYMPHOCYTES # BLD AUTO: 2.5 10E3/UL (ref 0.8–5.3)
LYMPHOCYTES NFR BLD AUTO: 27 %
MCH RBC QN AUTO: 26.1 PG (ref 26.5–33)
MCHC RBC AUTO-ENTMCNC: 32.5 G/DL (ref 31.5–36.5)
MCV RBC AUTO: 80 FL (ref 78–100)
MONOCYTES # BLD AUTO: 0.7 10E3/UL (ref 0–1.3)
MONOCYTES NFR BLD AUTO: 8 %
NEUTROPHILS # BLD AUTO: 5.7 10E3/UL (ref 1.6–8.3)
NEUTROPHILS NFR BLD AUTO: 62 %
NRBC # BLD AUTO: 0 10E3/UL
NRBC BLD AUTO-RTO: 0 /100
PLATELET # BLD AUTO: 308 10E3/UL (ref 150–450)
POTASSIUM BLD-SCNC: 4 MMOL/L (ref 3.5–5)
RBC # BLD AUTO: 5.29 10E6/UL (ref 3.8–5.9)
SODIUM SERPL-SCNC: 140 MMOL/L (ref 136–145)
TROPONIN I SERPL-MCNC: <0.01 NG/ML (ref 0–0.29)
WBC # BLD AUTO: 9.3 10E3/UL (ref 4–11)

## 2021-12-08 PROCEDURE — 93010 ELECTROCARDIOGRAM REPORT: CPT | Performed by: GENERAL ACUTE CARE HOSPITAL

## 2021-12-08 PROCEDURE — 99233 SBSQ HOSP IP/OBS HIGH 50: CPT | Performed by: PSYCHIATRY & NEUROLOGY

## 2021-12-08 PROCEDURE — 84484 ASSAY OF TROPONIN QUANT: CPT | Performed by: NURSE PRACTITIONER

## 2021-12-08 PROCEDURE — 250N000011 HC RX IP 250 OP 636: Performed by: PSYCHIATRY & NEUROLOGY

## 2021-12-08 PROCEDURE — 80048 BASIC METABOLIC PNL TOTAL CA: CPT | Performed by: NURSE PRACTITIONER

## 2021-12-08 PROCEDURE — 250N000013 HC RX MED GY IP 250 OP 250 PS 637: Performed by: PSYCHIATRY & NEUROLOGY

## 2021-12-08 PROCEDURE — 85004 AUTOMATED DIFF WBC COUNT: CPT | Performed by: NURSE PRACTITIONER

## 2021-12-08 PROCEDURE — 250N000013 HC RX MED GY IP 250 OP 250 PS 637: Performed by: NURSE PRACTITIONER

## 2021-12-08 PROCEDURE — 250N000009 HC RX 250: Performed by: NURSE PRACTITIONER

## 2021-12-08 PROCEDURE — 36415 COLL VENOUS BLD VENIPUNCTURE: CPT | Performed by: NURSE PRACTITIONER

## 2021-12-08 PROCEDURE — 93005 ELECTROCARDIOGRAM TRACING: CPT

## 2021-12-08 PROCEDURE — 128N000001 HC R&B CD/MH ADULT

## 2021-12-08 PROCEDURE — 76882 US LMTD JT/FCL EVL NVASC XTR: CPT | Mod: RT

## 2021-12-08 PROCEDURE — 93005 ELECTROCARDIOGRAM TRACING: CPT | Performed by: NURSE PRACTITIONER

## 2021-12-08 RX ORDER — IBUPROFEN 600 MG/1
600 TABLET, FILM COATED ORAL ONCE
Status: COMPLETED | OUTPATIENT
Start: 2021-12-08 | End: 2021-12-08

## 2021-12-08 RX ADMIN — ZIPRASIDONE HCL 40 MG: 40 CAPSULE ORAL at 17:21

## 2021-12-08 RX ADMIN — NICOTINE POLACRILEX 2 MG: 2 GUM, CHEWING BUCCAL at 10:10

## 2021-12-08 RX ADMIN — LITHIUM CARBONATE 900 MG: 300 TABLET, FILM COATED, EXTENDED RELEASE ORAL at 20:47

## 2021-12-08 RX ADMIN — GABAPENTIN 1200 MG: 400 CAPSULE ORAL at 13:10

## 2021-12-08 RX ADMIN — PANTOPRAZOLE SODIUM 40 MG: 40 TABLET, DELAYED RELEASE ORAL at 08:21

## 2021-12-08 RX ADMIN — CLINDAMYCIN PHOSPHATE: 10 GEL TOPICAL at 20:48

## 2021-12-08 RX ADMIN — NICOTINE POLACRILEX 2 MG: 2 GUM, CHEWING BUCCAL at 13:10

## 2021-12-08 RX ADMIN — NICOTINE POLACRILEX 2 MG: 2 GUM, CHEWING BUCCAL at 17:46

## 2021-12-08 RX ADMIN — TESTOSTERONE CYPIONATE 40 MG: 200 INJECTION INTRAMUSCULAR at 11:39

## 2021-12-08 RX ADMIN — MIRTAZAPINE 15 MG: 15 TABLET, FILM COATED ORAL at 20:46

## 2021-12-08 RX ADMIN — NICOTINE POLACRILEX 2 MG: 2 GUM, CHEWING BUCCAL at 08:21

## 2021-12-08 RX ADMIN — ESCITALOPRAM OXALATE 10 MG: 10 TABLET ORAL at 08:21

## 2021-12-08 RX ADMIN — IBUPROFEN 600 MG: 600 TABLET, FILM COATED ORAL at 11:38

## 2021-12-08 RX ADMIN — Medication 1 PATCH: at 08:20

## 2021-12-08 RX ADMIN — AMLODIPINE BESYLATE 10 MG: 10 TABLET ORAL at 08:22

## 2021-12-08 RX ADMIN — LITHIUM CARBONATE 300 MG: 300 TABLET, FILM COATED, EXTENDED RELEASE ORAL at 08:21

## 2021-12-08 RX ADMIN — OLANZAPINE 10 MG: 10 TABLET, FILM COATED ORAL at 19:26

## 2021-12-08 RX ADMIN — GABAPENTIN 900 MG: 300 CAPSULE ORAL at 08:20

## 2021-12-08 RX ADMIN — ZIPRASIDONE HCL 40 MG: 40 CAPSULE ORAL at 08:21

## 2021-12-08 RX ADMIN — GABAPENTIN 900 MG: 300 CAPSULE ORAL at 20:47

## 2021-12-08 RX ADMIN — Medication 1 CAPSULE: at 19:26

## 2021-12-08 RX ADMIN — Medication 1 CAPSULE: at 08:19

## 2021-12-08 RX ADMIN — METOPROLOL SUCCINATE 12.5 MG: 25 TABLET, EXTENDED RELEASE ORAL at 08:22

## 2021-12-08 RX ADMIN — LISDEXAMFETAMINE DIMESYLATE 20 MG: 20 CAPSULE ORAL at 09:34

## 2021-12-08 RX ADMIN — NICOTINE POLACRILEX 2 MG: 2 GUM, CHEWING BUCCAL at 14:52

## 2021-12-08 RX ADMIN — CLINDAMYCIN PHOSPHATE: 10 GEL TOPICAL at 08:22

## 2021-12-08 RX ADMIN — CLONAZEPAM 1 MG: 1 TABLET ORAL at 13:10

## 2021-12-08 RX ADMIN — LIDOCAINE HYDROCHLORIDE 30 ML: 20 SOLUTION ORAL; TOPICAL at 12:37

## 2021-12-08 RX ADMIN — NICOTINE POLACRILEX 2 MG: 2 GUM, CHEWING BUCCAL at 16:37

## 2021-12-08 ASSESSMENT — ACTIVITIES OF DAILY LIVING (ADL)
DRESS: INDEPENDENT
LAUNDRY: WITH SUPERVISION
ORAL_HYGIENE: INDEPENDENT
HYGIENE/GROOMING: INDEPENDENT
HYGIENE/GROOMING: INDEPENDENT

## 2021-12-08 NOTE — PROGRESS NOTES
12/08/21 1014   Engagement   Intervention Group   Topic Detail OT: Wellness (chair yoga and meditation) to promote self-worth, physical wellness, positive leisure activity and symptom mangement for mental health symptoms.   Attendance Did not attend   Reason for Not Attending Refused

## 2021-12-08 NOTE — PROGRESS NOTES
12/08/21 1509   Engagement   Intervention Group   Topic Detail OT: Scrutine[place] for concentration, healthy leisure, and insight development to manage mental health symptoms.   Attendance Attended   Patient Response Needs reinforcement/repetition to learn materials   Concentrated on Task 10 - 15 min   Cognition Restless   Mood/Affect Content;Congruent   Social/Behavioral Cooperative   Thought Content [place]   Goals addressed in session today Pt progressing toward OT goal by participating in gp today. Pt demonstrating limited attention span, completing first card of game then politely leaving gp opting to color. Pt provided with colored pencils to participate in leisure activity to manage mental health symptoms.

## 2021-12-08 NOTE — PROGRESS NOTES
Initial visit, spiritual care literature provided as requested by Pt. Pt appreciated the visit   to follow up as needed

## 2021-12-08 NOTE — PLAN OF CARE
"  Problem: Suicidal Behavior  Goal: Suicidal Behavior is Absent or Managed  Outcome: Improving     Problem: Mood Impairment (Anxiety Signs/Symptoms)  Goal: Improved Mood Symptoms (Anxiety Signs/Symptoms)  Outcome: Improving     Problem: Sleep Disturbance (Anxiety Signs/Symptoms)  Goal: Improved Sleep (Anxiety Signs/Symptoms)  Outcome: Improving  Patient slept on/off, was up confused at about midnight, patient inquired what time it was, states \"I thought it was day already.\" Patient went back to his bed and was out again at 0215 to request for mineral enhanced (flavored) water, then went back to bed. Patient has not come out since then or request for any other thing. Patient did not complain of anxiety this night, affect remains flat and blunted while awake. Patient endorses no SI/HI, A/VH or SIB. Patient is still in his room at this time. Staff will continue to monitor and document mood, behavior, thought process  and thought contents as well as establishing and maintaining a therapeutic relationship and rapport with patient.     Earle Mancini, DNP, RN, APRN, CNS, AGCNS-BC     "

## 2021-12-08 NOTE — PROGRESS NOTES
"[facility]    Medicine Progress Note - Hospitalist Service    Brief Summary: Ayana \"Prakash\"  VINCENT Prasad is a 31 year old transgender male with preferred pronouns he/him who presented to [facility] emergency department for mental health evaluation, anxiety.  Patient lives in a group home and was brought in by a roommate.  His roommate reported that patient had been hearing more voices the last 2 days.  Past medical history includes schizoaffective disorder, bipolar type, ADHD, PTSD, polysubstance abuse.  Hospital medicine consult for evaluation after patient punched the wall with a right hand injury.       Date of Admission:  11/27/2021    Assessment & Plan         #Atypical chest pain  -Troponin negative, EKG with normal sinus rhythm, no ectopy.  Vital signs stable.  -Patient reports chest pain/heartburn in the past which was relieved with a GI cocktail.  Still reporting some anxiety during this event  -Patient is on lithium so is not a good candidate for NSAIDs.  However this pain may be musculoskeletal in nature.  I have ordered a one-time dose of ibuprofen 600 mg as patient is rating the left-sided chest pain 7 out of 10.  I have also ordered a GI cocktail x1  -Low suspicion for cardiac etiology as troponin is negative, EKG with no ST elevation or arrhythmia  -Recommend ongoing management of anxiety.  Pulse is slightly elevated on exam 100 bpm which could be contributing to the chest pain symptoms as well.  I will follow up with patient to see if the GI cocktail or ibuprofen improved the pain  ADDENDUM 4:50pm: Blood work; CMP, CBC unremarkable. No further complaints of chest pain this afternoon. Will follow up with patient tomorrow     #Right upper arm cyst  #Left arm nodule  - Patient did not complain about right arm cyst today.  Ultrasound was done which was negative for abscess. CBC is within normal range with white blood cell count 9.3.  Afebrile.  I did not examine this site as patient was " not complaining of pain here.  He has similar lesions on the left upper arm, may be acne cysts.  Antibiotics not indicated at this time.  Patient should follow-up with primary care provider after discharge    #Primary hypertension  #Sinus tachycardia  -Likely secondary to anxiety and psychiatric illness  -On low-dose metoprolol 12.5 mg daily.  Blood pressure 128/92 with pulse 100 bpm.  Ventricular rate on EKG was 86 bpm and regular  -Continue current dose of metoprolol    #Schizoaffective disorder, bipolar type  -Psychiatry managing     Diet: Regular Diet Adult    DVT Prophylaxis: Low Risk/Ambulatory with no VTE prophylaxis indicated  Styles Catheter: Not present  Central Lines: None  Code Status: Full Code      Disposition Plan   Expected Discharge: per psychiatry team    Total floor/unit time spent was 25 minutes in reviewing the record, medications, lab results and completing documentation. 15 minutes spent in counseling and discussion with patient regarding diagnosis, medications and treatment plan. Care discussed and coordinated with patient and nurse.     BROOKLYN Cuadra [facility]  Hospitalist Service  [facility]  Securely message with the Vocera Web Console (learn more here)  Text page via [employer] Paging/Directory                 ______________________________________________________________________    Interval History   Patient reports sitting in the lounge area doing a puzzle when suddenly he felt sharp left-sided chest pain rated 7 out of 10, radiating up into the left collarbone.  Patient has not experienced pain like this before.  Did have some heartburn/chest pain symptoms in the recent past which was treated with a GI cocktail, which did help the pain at the time.  When patient was doing the puzzle today, he did not notice any extreme anxiety at that time, but became more anxious once the chest pain did set in.  Currently states his anxiety is high.  He is sitting on the edge of  "the bed in his room, noted mild left arm tremor.  After 30 minutes since onset the chest pain is still present, rated 7 out of 10.  No prior history of heart disease or arrhythmia.  Patient does have a chronically mild sinus tachycardia with heart rates in the 100s and occasionally \"I sometimes can feel my heart racing.\"  No shortness of breath, nausea or vomiting.    Review of Systems: 10 point review of systems negative except for pertinent positives mentioned in HPI    Data reviewed today: I reviewed all medications, new labs and imaging results over the last 24 hours. I personally reviewed the EKG tracing showing normal sinus rhythm. ,     Physical Exam   Vital Signs: Temp: 97.9  F (36.6  C) Temp src: Oral BP: (!) 128/92 Pulse: 107   Resp: 20 SpO2: 97 % O2 Device: None (Room air)    Weight: 233 lbs 1.6 oz  General: Alert, calm, no apparent distress  HEENT: Normocephalic, atraumatic, mucous membranes pink and moist, oropharynx without exudate, no lymphadenopathy   Respiratory: Lung sounds clear bilaterally, non-labored  Cardiovascular: S1, S2, rhythm rate regular, negative murmur, negative lower extremity edema  Gastrointestinal: Bowel sounds positive x4, nondistended and non-tender  Musculoskeletal: No joint deformities  Neurological: Alert and oriented x3, speech intact, moves all extremities equally, sensation intact    Data   Recent Labs   Lab 12/08/21  1122   WBC 9.3   HGB 13.8   MCV 80         POTASSIUM 4.0   CHLORIDE 107   CO2 24   BUN 14   CR 0.74   ANIONGAP 9   WILLIAMS 10.1   GLC 83   Troponin negative     No results found for this or any previous visit (from the past 24 hour(s)).  Medications       - Psychiatric Emergency -   Does not apply See Admin Instructions     amLODIPine  10 mg Oral Daily     clindamycin   Topical BID     escitalopram  10 mg Oral Daily     fluPHENAZine decanoate  25 mg Intramuscular Q14 Days     gabapentin  1,200 mg Oral Daily     gabapentin  900 mg Oral BID "     lactobacillus rhamnosus (GG)  1 capsule Oral BID     [START ON 12/9/2021] lisdexamfetamine  30 mg Oral Daily     lithium ER  300 mg Oral QAM     lithium ER  900 mg Oral At Bedtime     metoprolol succinate ER  12.5 mg Oral Daily     mirtazapine  15 mg Oral At Bedtime     nicotine  1 patch Transdermal Daily     nicotine   Transdermal Q8H     pantoprazole  40 mg Oral Daily     testosterone cypionate  0.2 mL Subcutaneous Q7 Days     ziprasidone  40 mg Oral BID w/meals

## 2021-12-08 NOTE — PLAN OF CARE
Problem: Suicidal Behavior  Goal: Suicidal Behavior is Absent or Managed  Outcome: No Change  Flowsheets (Taken 12/6/2021 1218 by Casalenda, Gina R, RN)  Mutually Determined Action Steps (Suicidal Behavior Absent/Managed): verbalizes safety check rationale     Problem: Behavioral Health Plan of Care  Goal: Plan of Care Review  Outcome: No Change   Patient has been complaining about food since dinner and escalating talking about wanting to cause a code. Patient was given Zyprexa per patient request. Patient observed walking around angry at snack time stating he got the wrong order. Writer offered other options patient refused and later agreed to eat a yoghurt. Patient was given all his night time med's and he requested for Klonopin and another Zyprexa. Five minutes later when writer went to check on patient to give the Klonopin , he was noted to be snoring therefore, Klonopin was not given. Writer will keep monitoring patient and administer Klonopin when patient wakes up. Patient has been noted to be irritable and restless for the most part of the shift. Staff will continue to monitor.

## 2021-12-08 NOTE — PROVIDER NOTIFICATION
Patient complained of chest pain on the left side, radiating close to left shoulder, denied dizziness, vitals Temp 97.9, Pulse 97 Resp 20 /76 (sitting), oxygen saturation 97 %. /92, pulse 107 (standing) at 1051, medical service provider (Nancy) was paged and informed of this

## 2021-12-08 NOTE — PLAN OF CARE
Problem: Mood Impairment (Anxiety Signs/Symptoms)  Goal: Improved Mood Symptoms (Anxiety Signs/Symptoms)  Outcome: No Change     Problem: Suicidal Behavior  Goal: Suicidal Behavior is Absent or Managed  Outcome: Improving     Patient is cooperative with assessment. Reports elevated anxiety after transfer from C side this morning, but otherwise adjusting well. He rated anxiety 9/10. He denied depression, SI and HI. Second and last dose of prn klonopin given at 15:45, and brought anxiety down to 4/10. Patient was out for meals, groups, and activities. Good food and fluid intake. Pleasant and social with staff and peers. He endorsed auditory hallucinations, and reported they were getting a little louder after dinner. Order obtained to use headphones, to distract self per patient's request.   Patient seen by hospitalist for lump on right arm on day shift. New order for U/S for right arm not done yet. Patient reported pain 5/10 and used warm pack with some effect. BP at hs 167/102 sitting and 160/97 standing. Prn hydralazine 10 mg given with hs meds.    Designated visitor is not allowed to visit now, and was turned away due to attempt to bring marijuana last time. Patient was upset about this, and requested to move back to C side and to get extra dose of klonopin. Patient was encouraged to use coping skills and prn medication available to him first. Patient given IM zyprexa and atarax which was effective. Patient reported feeling calmer and was out for snack and hs meds before going back to bed. No aggression or self harm behavior noted.

## 2021-12-08 NOTE — PLAN OF CARE
Problem: Suicidal Behavior  Goal: Suicidal Behavior is Absent or Managed  Outcome: No Change  Patient denied SI at this time  Problem: Mood Impairment (Anxiety Signs/Symptoms)  Goal: Improved Mood Symptoms (Anxiety Signs/Symptoms)  Outcome: Improving  Patient has been calm and cooperative so far this shift, denied all psych symptoms so far          Patient's affect has been flat, though noted to be calm and cooperative with cares/medications so far, denied all psych symptoms, including pain, contracted for safety, visible on the unit as of now, paces hallway intermittently

## 2021-12-08 NOTE — SIGNIFICANT EVENT
Significant Event Note    Time of event: 10:57 AM December 8, 2021    Description of event:  Received a page from nursing staff.  Patient complaining of left-sided chest pain.  Vital signs currently stable.  Patient has had chest pain in the past with previous work-ups that have been negative.  No prior history of heart disease.    Plan:  Obtain EKG now  Obtain troponin x1  Check BMP and CBC    Discussed with: charge nurse. I will see the patient asap. Please refer to my progress note from today for full assessment.     BROOKLYN Cuadra CNP

## 2021-12-08 NOTE — PLAN OF CARE
Assessment/Intervention, Care Coordination and Current Symptoms:    Writer contacted patient's  and the  to confirm the time of the conference call this afternoon. Writer then checked in with patient, who presented with an anxious mood due to his chest pain. Otherwise patient denied all psych symptoms. At 2:45 writer met with patient again to facilitate conference call with Domenica and Vero. Vero's updates included that the [facility]  is working on finding patient an apartment, and a different group home for in the meantime (must accept dogs). Patient is willing to return to [facility], but would like to switch to a different group home eventually. Domenica stated that patient is able to return to [facility]. Domenica noted that one condition they're asking is for patient's pharmacy to switch from [facility] to [facility]. [facility] partners with [facility] and therefore delivers directly to the group home staff, so patient would not have access to his medications, which is the case currently with [facility]. Domenica believes the current set up using [facility] has allowed patient to access all his  medications leading to potential overdose. Due to patient being on a restricted program, he will need to work with his  and staff at [facility] to see if this pharmacy switch can happen. Patient was agreeable to all of Domenica and Vero's requests. Writer had spoken to the MD who is wanting pt to discharge either tomorrow or Friday. Domenica reported that due to staffing reasons, Monday would be more ideal for discharge. Writer explained that if the treatment team feels patient no longer meets criteria for inpatient then it would be preferred to discharge Friday. Domenica to follow up with primary  regarding a concrete discharge date.     Discharge Plan or Goal:  Return to group [facility] ([place], [place])     Barriers to Discharge:  symptom stabilization, medication management, coordination of care      Referral Status:  12/2/21 - [facility]. ( out-of-office until Monday, December 6) per patient request.      Legal Status:  issac Power [facility], 12/8/2021, 3:54pm

## 2021-12-08 NOTE — PROGRESS NOTES
Pt's Visitor (Rod) was not allowed to visit because the last time he came pt's visitor was escorted out by security because Rod brought  marijuana into the unit. Leader (Juan) on call informed and she said visitor is not allowed to visit pt.

## 2021-12-09 ENCOUNTER — TELEPHONE (OUTPATIENT)
Dept: FAMILY MEDICINE | Facility: CLINIC | Age: 31
End: 2021-12-09
Payer: COMMERCIAL

## 2021-12-09 PROBLEM — G25.71 AKATHISIA: Status: ACTIVE | Noted: 2021-12-09

## 2021-12-09 PROCEDURE — 90853 GROUP PSYCHOTHERAPY: CPT

## 2021-12-09 PROCEDURE — 99233 SBSQ HOSP IP/OBS HIGH 50: CPT | Performed by: PSYCHIATRY & NEUROLOGY

## 2021-12-09 PROCEDURE — 128N000001 HC R&B CD/MH ADULT

## 2021-12-09 PROCEDURE — 250N000013 HC RX MED GY IP 250 OP 250 PS 637: Performed by: PSYCHIATRY & NEUROLOGY

## 2021-12-09 PROCEDURE — 250N000009 HC RX 250: Performed by: NURSE PRACTITIONER

## 2021-12-09 PROCEDURE — 250N000013 HC RX MED GY IP 250 OP 250 PS 637: Performed by: NURSE PRACTITIONER

## 2021-12-09 RX ORDER — LISDEXAMFETAMINE DIMESYLATE 20 MG/1
40 CAPSULE ORAL DAILY
Status: DISCONTINUED | OUTPATIENT
Start: 2021-12-10 | End: 2021-12-10 | Stop reason: HOSPADM

## 2021-12-09 RX ORDER — GABAPENTIN 300 MG/1
CAPSULE ORAL
Qty: 300 CAPSULE | Refills: 0 | Status: SHIPPED | OUTPATIENT
Start: 2021-12-09 | End: 2022-01-02

## 2021-12-09 RX ORDER — CAFFEINE 200 MG
100 TABLET ORAL EVERY 6 HOURS PRN
Status: DISCONTINUED | OUTPATIENT
Start: 2021-12-09 | End: 2021-12-10 | Stop reason: HOSPADM

## 2021-12-09 RX ORDER — DIPHENHYDRAMINE HCL 50 MG
50 CAPSULE ORAL AT BEDTIME
Status: DISCONTINUED | OUTPATIENT
Start: 2021-12-09 | End: 2021-12-10 | Stop reason: HOSPADM

## 2021-12-09 RX ORDER — LIDOCAINE 50 MG/G
OINTMENT TOPICAL 2 TIMES DAILY
Status: DISCONTINUED | OUTPATIENT
Start: 2021-12-09 | End: 2021-12-10 | Stop reason: HOSPADM

## 2021-12-09 RX ORDER — ACETAMINOPHEN 500 MG
1000 TABLET ORAL EVERY 6 HOURS PRN
Status: DISCONTINUED | OUTPATIENT
Start: 2021-12-09 | End: 2021-12-10 | Stop reason: HOSPADM

## 2021-12-09 RX ADMIN — NICOTINE POLACRILEX 2 MG: 2 GUM, CHEWING BUCCAL at 12:05

## 2021-12-09 RX ADMIN — GABAPENTIN 1200 MG: 400 CAPSULE ORAL at 13:35

## 2021-12-09 RX ADMIN — ZIPRASIDONE HCL 40 MG: 40 CAPSULE ORAL at 07:42

## 2021-12-09 RX ADMIN — Medication 1 CAPSULE: at 19:03

## 2021-12-09 RX ADMIN — LISDEXAMFETAMINE DIMESYLATE 30 MG: 20 CAPSULE ORAL at 08:45

## 2021-12-09 RX ADMIN — AMLODIPINE BESYLATE 10 MG: 10 TABLET ORAL at 08:20

## 2021-12-09 RX ADMIN — LITHIUM CARBONATE 300 MG: 300 TABLET, FILM COATED, EXTENDED RELEASE ORAL at 08:21

## 2021-12-09 RX ADMIN — NICOTINE POLACRILEX 2 MG: 2 GUM, CHEWING BUCCAL at 17:22

## 2021-12-09 RX ADMIN — NICOTINE POLACRILEX 2 MG: 2 GUM, CHEWING BUCCAL at 10:32

## 2021-12-09 RX ADMIN — NICOTINE POLACRILEX 2 MG: 2 GUM, CHEWING BUCCAL at 14:53

## 2021-12-09 RX ADMIN — NICOTINE POLACRILEX 2 MG: 2 GUM, CHEWING BUCCAL at 09:29

## 2021-12-09 RX ADMIN — CAFFEINE 100 MG: 200 TABLET ORAL at 09:29

## 2021-12-09 RX ADMIN — LITHIUM CARBONATE 900 MG: 300 TABLET, FILM COATED, EXTENDED RELEASE ORAL at 20:19

## 2021-12-09 RX ADMIN — ESCITALOPRAM OXALATE 10 MG: 10 TABLET ORAL at 08:21

## 2021-12-09 RX ADMIN — ZIPRASIDONE HCL 40 MG: 40 CAPSULE ORAL at 17:10

## 2021-12-09 RX ADMIN — NICOTINE POLACRILEX 2 MG: 2 GUM, CHEWING BUCCAL at 15:55

## 2021-12-09 RX ADMIN — Medication 1 PATCH: at 08:48

## 2021-12-09 RX ADMIN — ACETAMINOPHEN 1000 MG: 500 TABLET ORAL at 09:31

## 2021-12-09 RX ADMIN — METOPROLOL SUCCINATE 12.5 MG: 25 TABLET, EXTENDED RELEASE ORAL at 07:41

## 2021-12-09 RX ADMIN — GABAPENTIN 900 MG: 300 CAPSULE ORAL at 08:22

## 2021-12-09 RX ADMIN — NICOTINE POLACRILEX 2 MG: 2 GUM, CHEWING BUCCAL at 18:34

## 2021-12-09 RX ADMIN — CLONAZEPAM 1 MG: 1 TABLET ORAL at 10:29

## 2021-12-09 RX ADMIN — Medication 1 CAPSULE: at 07:42

## 2021-12-09 RX ADMIN — LIDOCAINE 1 G: 50 OINTMENT TOPICAL at 20:19

## 2021-12-09 RX ADMIN — LIDOCAINE: 50 OINTMENT TOPICAL at 16:26

## 2021-12-09 RX ADMIN — CAFFEINE 100 MG: 200 TABLET ORAL at 16:26

## 2021-12-09 RX ADMIN — PANTOPRAZOLE SODIUM 40 MG: 40 TABLET, DELAYED RELEASE ORAL at 08:21

## 2021-12-09 RX ADMIN — NICOTINE POLACRILEX 2 MG: 2 GUM, CHEWING BUCCAL at 07:42

## 2021-12-09 RX ADMIN — CLONAZEPAM 1 MG: 1 TABLET ORAL at 20:19

## 2021-12-09 RX ADMIN — CLINDAMYCIN PHOSPHATE: 10 GEL TOPICAL at 20:19

## 2021-12-09 RX ADMIN — MIRTAZAPINE 15 MG: 15 TABLET, FILM COATED ORAL at 20:19

## 2021-12-09 RX ADMIN — GABAPENTIN 900 MG: 300 CAPSULE ORAL at 20:18

## 2021-12-09 ASSESSMENT — ACTIVITIES OF DAILY LIVING (ADL)
DRESS: INDEPENDENT
ORAL_HYGIENE: INDEPENDENT
HYGIENE/GROOMING: INDEPENDENT
HYGIENE/GROOMING: INDEPENDENT
DRESS: INDEPENDENT
ORAL_HYGIENE: INDEPENDENT

## 2021-12-09 NOTE — PROGRESS NOTES
"Patient approached this writer and asked for extra snack stating \"I am starving, this medications are making me hungry.\" Patient was offered fruits and he refused saying he doesn't eat fruits. Patient was then given two applesauce and 4 crackers. Patient asked for sun butter and writer checked the kitchen and there were no sun butter left. When writer told patient they are finished patient verbalized that \"I just got one from a staff with some snack a few minutes ago.\" Patient then ate his snack then came to this writer and asked for Zyprexa IM. Writer explained that IM are usually administered in an emergency situation. Patient then replied \"Do you want me to cause trouble before giving me Zyprexa? This writer explained to patient that if one can take PO then there's no need for IM. \"I am stuck in this fucked place with no food.\" Patient then agreed to take it orally. Patient received 10 mg of Zyprexa and was observed on the phone in the lounge. Staff will continue to monitor.  "

## 2021-12-09 NOTE — PROVIDER NOTIFICATION
12/09/21 1600   Engagement   Intervention Group   Topic Detail SW DBT Process   Attendance Attended   Patient Response Demonstrated understanding of materials provided   Concentrated on Task 30 - 45 min   Cognition Restless   Mood/Affect Content   Social/Behavioral Cooperative;Engaged   Thought Content Coleman     GROUP LENGTH (MINS):   45   RELEVANT PRIMARY DIAGNOSIS: Patient Active Problem List   Diagnosis     ADHD (attention deficit hyperactivity disorder)     Bipolar 1 disorder, manic, mild     Marijuana abuse     Polysubstance abuse (H)     GERD (gastroesophageal reflux disease)     Tobacco abuse     Abdominal pain, right upper quadrant     Intractable back pain     Optic neuritis     Cauda equina syndrome with neurogenic bladder (H)     Schizoaffective disorder, bipolar type (H)     PTSD (post-traumatic stress disorder)     Anxiety     Auditory hallucination     Class 2 obesity due to excess calories in adult     Nephrolithiasis     Cyst of left ovary     Borderline personality disorder (H)     Cannabis dependence (H)     Depression     Episodic mood disorder (H)     History of heroin abuse (H)     Moderate episode of recurrent major depressive disorder (H)     Opioid use disorder, severe, dependence (H)     Substance-induced psychotic disorder with hallucinations (H)     Nausea     Overdose     Suicidal ideation     Bella (H)     Spell of altered consciousness     Urinary retention     Obesity (BMI 35.0-39.9) with comorbidity (H)     Chronic bilateral low back pain without sciatica     AVA (generalized anxiety disorder)     Aggressive behavior     Gender identity disorder     Lumbosacral radiculopathy at L5     DUB (dysfunctional uterine bleeding)     Seizure-like activity (H)     Elevated blood pressure reading without diagnosis of hypertension     Acanthosis nigricans     Viral gastroenteritis     Prediabetes     Schizoaffective disorder, chronic condition with acute exacerbation (H)     Bipolar  affective disorder, mixed, severe, with psychotic behavior (H)     Schizophrenia, schizoaffective, chronic with acute exacerbation (H)        TREATMENT MODALITY:      []CBT       [x]DBT       []ACT       []Interpersonal psychotherapy                                 []Psychoeducational therapy       [x]Skill development       []Other:        ATTENDANCE:        []Stayed for entire group     []Arrived late    [x] Left early       # OF PATIENTS IN GROUP: 5   BILLABLE:     [x]Yes            []No   Notes:

## 2021-12-09 NOTE — PLAN OF CARE
Problem: Sleep Disturbance (Anxiety Signs/Symptoms)  Goal: Improved Sleep (Anxiety Signs/Symptoms)  Outcome: Improving     As of 0645, has slept > 7 hrs. Safety checks completed every 15 minutes. Self-injury and assault precautions continued. Pt has been sleeping uneventfully.

## 2021-12-09 NOTE — TELEPHONE ENCOUNTER
Reason for Call:  Request for form completion     Type of form:  [place] RRP     Where the form was placed: [employer] in basket    What number is listed as a contact on the form?: [facility] [phone]        Additional comments: restricted patient is being discharged from [facility] on 12/10/21 and needs the form signed to allow provider Gillian Andrade to d/c    Call taken on 12/9/2021 at 3:25 PM by Terri Lipscomb

## 2021-12-09 NOTE — PROGRESS NOTES
"PSYCHIATRY PROGRESS NOTE         DATE OF SERVICE:   12/8/2021         CHIEF COMPLAINT:     Anxiety about care conference today,  ultrasound of the growth on the right arm, asks for increase of Vyvanse, wants to wear his hat for [employer]ism reasons          OBJECTIVE:     Nursing reports : Compliant with groups and medications      reports working on outpatient referrals    Hospitalist consult by Star Mora APRN CNP on 12/2/2021  Elevated Blood Pressure without official hypertension diagnosis:  Elevated Blood Pressure without official hypertension diagnosis:  Dating back May 2021 per external facility problem list. Not on BP medication outpatient.  Amlodipine started during this admission. Increased Amlodipine dose 12/01  BP elevated prior to morning dose Amlodipine.   Recommend manual blood pressures for more accurate blood pressure readings.   PRN Hydralazine with parameters (inpatient use only)  Will need follow up outpatient after discharge.    # Self Inflicted Injury Right Inner Forearm:   # Puncture wound:  11/30  \" twisted pencil in healing scab area right inner forearm. Imaging negative for foreign body and fracture.  Keeps picking at sore. Cover with dressing. Encouraged not to pick skin.  Continue prophylaxis doxycycline. Oral probiotics to avoid abx associated diarrhea. Watch for new onset loose stool.          SUBJECTIVE:      Prakash says that he was anxious last night about group home meeting today.  He is irritable.  He says that the group home is not well around, but he has to go back there.  He says that he really wants to live in the apartment.  He says that he is anxious about right arm ultrasound.  There is no abscess and most likely he has edema or scar from previous venipuncture.  He says that he wants to wear his hat for Hinduism reason.  He says that he was on Vyvanse 50 mg daily and he needs higher dose.  I will gradually increase it.  I discussed controlled " substances with him.  I will not order controlled substances for him and he would have to call his outpatient psychiatrist for the order.  He says that it was ordered already and it needs to be picked up.  He cannot have access to medications.   says that they discussed that during the care conference with [facility].  The staff from [facility] will call the pharmacy to tell them that patient cannot  medications and only staff can pick it up or it has to be delivered.  He denies suicidal and homicidal ideas.  He denies delusions and hallucinations.  Appetite is decreased, energy fluctuates, concentration improving, but still below baseline.  He says it will get better with Vyvanse.  He denies other medical problems.  .       MEDICATIONS:       - Psychiatric Emergency -   Does not apply See Admin Instructions     amLODIPine  10 mg Oral Daily     clindamycin   Topical BID     escitalopram  10 mg Oral Daily     fluPHENAZine decanoate  25 mg Intramuscular Q14 Days     gabapentin  1,200 mg Oral Daily     gabapentin  900 mg Oral BID     lactobacillus rhamnosus (GG)  1 capsule Oral BID     lidocaine   Topical BID 09 12     lisdexamfetamine  30 mg Oral Daily     lithium ER  300 mg Oral QAM     lithium ER  900 mg Oral At Bedtime     metoprolol succinate ER  12.5 mg Oral Daily     mirtazapine  15 mg Oral At Bedtime     nicotine  1 patch Transdermal Daily     nicotine   Transdermal Q8H     pantoprazole  40 mg Oral Daily     testosterone cypionate  0.2 mL Subcutaneous Q7 Days     ziprasidone  40 mg Oral BID w/meals     caffeine **AND** acetaminophen, alum & mag hydroxide-simethicone, clonazePAM, hydrALAZINE, hydrOXYzine, nicotine, OLANZapine **OR** OLANZapine, ondansetron, polyethylene glycol    Medication adherence: Yes  Medication side effects: No  Benefit: Symptom reduction         ROS:   As per history of present illness, right inner arm lump, otherwise reminder of review of systems is negative for:  "General, eyes, ears, nose, throat, neck, respiratory, cardiovascular, gastrointestinal, genitourinary, meniscal skeletal, neurological, hematological, dermatological and endocrine system.         MENTAL STATUS EXAM:   BP (!) 147/64 (Patient Position: Standing)   Pulse 114   Temp 97.8  F (36.6  C) (Oral)   Resp 16   Ht 1.651 m (5' 5\")   Wt 105.7 kg (233 lb 1.6 oz)   SpO2 96%   BMI 38.79 kg/m      Appearance:fair hygiene, cooperative  Orientation: Alert and oriented x3  Speech: Normal in rate and tone  Language ability: Normal syntax and vocabulary  Thought process: concrete  Thought content: Denies delusions and hallucinations  Associations: Connected  Suicidal Ideation: Denies  Homicidal Ideation: Denies  Mood: Depressed  Affect: Irritable  Intellectual functioning:average  Fund of Knowledge: Consistent with education and experience  Attention/Concentration: decreased  Memory: intact  Psychomotor Behavior:  Mild agitation  Muscle Strength and Tone: no atrophy or involuntary movement  Gait and Station: steady  Insight and judgement: Impaired, going through revocation of provisional discharge, on extended commitment since 2019          LABS:   personally reviewed.     11/26/2021  COVID-19 test negative  Lithium level 0.9  TSH 4.95  Free T4 0.87  Pregnancy test negative  Creatinine 0.78    Lab Results   Component Value Date     11/26/2021     10/06/2021     05/19/2021     01/10/2021     12/17/2020    CO2 26 11/26/2021    CO2 21 10/06/2021    CO2 19 05/19/2021    CO2 21 01/10/2021    CO2 19 12/17/2020    BUN 16 11/26/2021    BUN 12 10/06/2021    BUN 11 05/19/2021    BUN 13 01/10/2021    BUN 11 12/17/2020     No results found for: CKTOTAL, CKMB, TROPONINI  Lab Results   Component Value Date    WBC 12.6 11/26/2021    WBC 12.4 10/06/2021    WBC 13.1 05/19/2021    WBC 12.0 01/10/2021    WBC 12.4 12/15/2020    HGB 14.0 11/26/2021    HGB 13.1 10/06/2021    HGB 13.6 05/19/2021    HGB 12.2 " 03/01/2021    HGB 13.0 01/10/2021    HCT 42.6 11/26/2021    HCT 40.8 10/06/2021    HCT 41.6 05/19/2021    HCT 39.8 01/10/2021    HCT 38.9 12/15/2020    MCV 81 11/26/2021    MCV 82 10/06/2021    MCV 84 05/19/2021    MCV 83 01/10/2021    MCV 85 12/15/2020     11/26/2021     10/06/2021     05/19/2021     01/10/2021     12/18/2020     Lab Results   Component Value Date    CHOL 181 04/28/2021    CHOL 188 03/01/2021    CHOL 230 10/23/2019    TRIG 317 04/28/2021    TRIG 358 03/01/2021    TRIG 211 10/23/2019    HDL 37 04/28/2021    HDL 66 03/01/2021    HDL 58 10/23/2019     ECG on 11/30/2021  Systolic Blood Pressure mmHg       Diastolic Blood Pressure mmHg      Ventricular Rate BPM 76      Atrial Rate BPM 76      IN Interval ms 158      QRS Duration ms 98      QT ms 390      QTc ms 438      P Axis degrees 31      R AXIS degrees 25      T Axis degrees 27      Interpretation ECG  Sinus rhythm   Normal ECG     12/8/2021                Diastolic Blood Pressure mmHg            Ventricular Rate BPM 86  76          Atrial Rate BPM 86  76          IN Interval ms 176  158          QRS Duration ms 98  98          QT ms 372  390          QTc ms 445  438          P [place] degrees 32  31          R AXIS degrees 23  25          T Axis degrees 21  27          Interpretation ECG  Sinus rhythm   Normal ECG   When compared with ECG of 30-NOV-2021 11:28,   No significant change was found  Sinus rhythm   Normal ECG   When              Ref. Range 11/26/2021 23:03 11/27/2021 17:27 12/1/2021 13:41   Sodium Latest Ref Range: 133 - 144 mmol/L 137     Potassium Latest Ref Range: 3.4 - 5.3 mmol/L 3.6     Chloride Latest Ref Range: 94 - 109 mmol/L 105     Carbon Dioxide Latest Ref Range: 20 - 32 mmol/L 26     Urea Nitrogen Latest Ref Range: 7 - 30 mg/dL 16     Creatinine Latest Ref Range: 0.52 - 1.25 mg/dL 0.78     GFR Estimate Latest Ref Range: >60 mL/min/1.73m2 >90     Calcium Latest Ref Range: 8.5 - 10.1 mg/dL 10.1      Anion Gap Latest Ref Range: 3 - 14 mmol/L 6     HCG Qual Urine Latest Ref Range: Negative   Negative    T4 Free Latest Units: ng/dL 0.87     TSH Latest Ref Range: 0.40 - 4.00 mU/L 4.95 (H)     Glucose Latest Ref Range: 70 - 99 mg/dL 93     GLUCOSE BY METER POCT Latest Ref Range: 70 - 99 mg/dL   157 (H)   WBC Latest Ref Range: 4.0 - 11.0 10e3/uL 12.6 (H)     Hemoglobin Latest Ref Range: 11.7 - 17.7 g/dL 14.0     Hematocrit Latest Ref Range: 35.0 - 53.0 % 42.6     Platelet Count Latest Ref Range: 150 - 450 10e3/uL 358     RBC Count Latest Ref Range: 3.80 - 5.90 10e6/uL 5.27     MCV Latest Ref Range: 78 - 100 fL 81     MCH Latest Ref Range: 26.5 - 33.0 pg 26.6     MCHC Latest Ref Range: 31.5 - 36.5 g/dL 32.9     RDW Latest Ref Range: 10.0 - 15.0 % 14.9     % Neutrophils Latest Units: % 67     % Lymphocytes Latest Units: % 25     % Monocytes Latest Units: % 5     % Eosinophils Latest Units: % 2     % Basophils Latest Units: % 1     Absolute Basophils Latest Ref Range: 0.0 - 0.2 10e3/uL 0.1     Absolute Eosinophils Latest Ref Range: 0.0 - 0.7 10e3/uL 0.2     Absolute Immature Granulocytes Latest Ref Range: <=0.0 10e3/uL 0.0     Absolute Lymphocytes Latest Ref Range: 0.8 - 5.3 10e3/uL 3.2     Absolute Monocytes Latest Ref Range: 0.0 - 1.3 10e3/uL 0.7     % Immature Granulocytes Latest Units: % 0     Absolute Neutrophils Latest Ref Range: 1.6 - 8.3 10e3/uL 8.4 (H)     Absolute NRBCs Latest Units: 10e3/uL 0.0     NRBCs per 100 WBC Latest Ref Range: <1 /100 0     SARS CoV2 PCR Latest Ref Range: Negative  Negative     Lithium Level Latest Ref Range:   mmol/L 0.9         Ref. Range 12/8/2021 11:22   Sodium Latest Ref Range: 136 - 145 mmol/L 140   Potassium Latest Ref Range: 3.5 - 5.0 mmol/L 4.0   Chloride Latest Ref Range: 98 - 107 mmol/L 107   Carbon Dioxide Latest Ref Range: 22 - 31 mmol/L 24   Urea Nitrogen Latest Ref Range: 8 - 22 mg/dL 14   Creatinine Latest Ref Range: 0.60 - 1.30 mg/dL 0.74   GFR Estimate Latest Ref  Range: >60 mL/min/1.73m2 >90   Calcium Latest Ref Range: 8.5 - 10.5 mg/dL 10.1   Anion Gap Latest Ref Range: 5 - 18 mmol/L 9   Troponin I Latest Ref Range: 0.00 - 0.29 ng/mL <0.01   Glucose Latest Ref Range: 70 - 125 mg/dL 83   WBC Latest Ref Range: 4.0 - 11.0 10e3/uL 9.3   Hemoglobin Latest Ref Range: 11.7 - 17.7 g/dL 13.8   Hematocrit Latest Ref Range: 35.0 - 53.0 % 42.4   Platelet Count Latest Ref Range: 150 - 450 10e3/uL 308   RBC Count Latest Ref Range: 3.80 - 5.90 10e6/uL 5.29   MCV Latest Ref Range: 78 - 100 fL 80   MCH Latest Ref Range: 26.5 - 33.0 pg 26.1 (L)   MCHC Latest Ref Range: 31.5 - 36.5 g/dL 32.5   RDW Latest Ref Range: 10.0 - 15.0 % 14.7   % Neutrophils Latest Units: % 62   % Lymphocytes Latest Units: % 27   % Monocytes Latest Units: % 8   % Eosinophils Latest Units: % 3   % Basophils Latest Units: % 0   Absolute Basophils Latest Ref Range: 0.0 - 0.2 10e3/uL 0.0   Absolute Eosinophils Latest Ref Range: 0.0 - 0.7 10e3/uL 0.3   Absolute Immature Granulocytes Latest Ref Range: <=0.4 10e3/uL 0.0   Absolute Lymphocytes Latest Ref Range: 0.8 - 5.3 10e3/uL 2.5   Absolute Monocytes Latest Ref Range: 0.0 - 1.3 10e3/uL 0.7   % Immature Granulocytes Latest Units: % 0   Absolute Neutrophils Latest Ref Range: 1.6 - 8.3 10e3/uL 5.7   Absolute NRBCs Latest Units: 10e3/uL 0.0   NRBCs per 100 WBC Latest Ref Range: <1 /100 0     EXAM: US UPPER EXTREMITY NON VASCULAR RIGHT  LOCATION: [facility]  DATE/TIME: 12/8/2021 4:08 PM     INDICATION: assess lump/cyst medial aspect right upper arm  COMPARISON: None.  TECHNIQUE: Routine.   FINDINGS: Imaging performed over the antecubital fossa lump. The subcutaneous tissue contains an irregularly marginated 12 x 10 x 3 mm anechoic area with some scant peripheral blood flow. This could represent a small area of edema or scar, possibly from   prior vena puncture.  No abscess.         DIAGNOSIS:     Schizoaffective disorder chronic with acute  exacerbation  Generalized anxiety disorder  Posttraumatic stress disorder  ADHD combined type  Borderline personality disorder  Marijuana dependency    Patient Active Problem List   Diagnosis     ADHD (attention deficit hyperactivity disorder)     Bipolar 1 disorder, manic, mild     Marijuana abuse     Polysubstance abuse (H)     GERD (gastroesophageal reflux disease)     Tobacco abuse     Abdominal pain, right upper quadrant     Intractable back pain     Optic neuritis     Cauda equina syndrome with neurogenic bladder (H)     Schizoaffective disorder, bipolar type (H)     PTSD (post-traumatic stress disorder)     Anxiety     Auditory hallucination     Class 2 obesity due to excess calories in adult     Nephrolithiasis     Cyst of left ovary     Borderline personality disorder (H)     Cannabis dependence (H)     Depression     Episodic mood disorder (H)     History of heroin abuse (H)     Moderate episode of recurrent major depressive disorder (H)     Opioid use disorder, severe, dependence (H)     Substance-induced psychotic disorder with hallucinations (H)     Nausea     Overdose     Suicidal ideation     Bella (H)     Spell of altered consciousness     Urinary retention     Obesity (BMI 35.0-39.9) with comorbidity (H)     Chronic bilateral low back pain without sciatica     AVA (generalized anxiety disorder)     Aggressive behavior     Gender identity disorder     Lumbosacral radiculopathy at L5     DUB (dysfunctional uterine bleeding)     Seizure-like activity (H)     Elevated blood pressure reading without diagnosis of hypertension     Acanthosis nigricans     Viral gastroenteritis     Prediabetes     Schizoaffective disorder, chronic condition with acute exacerbation (H)     Bipolar affective disorder, mixed, severe, with psychotic behavior (H)     Schizophrenia, schizoaffective, chronic with acute exacerbation (H)          PLAN:   Patient says that hallucinations are improving on Geodon.  He is under extended  commitment since 2019.  Provisional discharge was revoked.    He had care conference with his group home and .  We discussed diagnosis and treatment plan and patient agrees with the following recommendations:    Medications:  Increase Vyvanse 30 mg  daily for ADHD  Geodon 40 mg twice daily with food for mood and hallucinations  Remeron 15 mg nightly for sleep  Klonopin 1 mg twice daily as needed for anxiety  Prolixin Decanoate 25 mg IM every 14 days, given on 12/1/2021  Lithobid 300 mg every morning and 900 mg nightly  Neurontin 900 mg twice daily and 1200 mg midday  Lexapro 10 mg daily  Nicotine patch 21 mg daily  Continue nonpsychiatric medications:  Amlodipine 10 mg daily  Doxycycline 100 mg twice daily for 5 days completed  Protonix 40 mg daily  Depo testosterone 40 mg every 7 days, given on 12/1/2021  We discussed side effects, benefits and alternative treatments and patient agrees with capacity to do so.  Rule 25 for chemical dependency treatment   will collect collateral information and make outpatient referrals  Staff to provide emotional support and redirect as needed  Patient encouraged to attend groups  Lab results: Reviewed personally  Consultation: Hospitalist consult completed  May wear hat for [employer] reasons    Risk Assessment: Off one-to-one, agrees not to hurt himself    Coordination of Care:   Patient seen, medical record reviewed, care coordinated with the team.    Total time: More than 35 minutes spent on this visit with more than 50% of time spent in coordination of care with staff, psychoeducation of the patient and providing supportive therapy regarding above symptoms..    This document is created with the help of Dragon dictation system.  All grammatical/typing errors or context distortion are unintentional and inherent to software.    Gillian Andrade MD        Re-Certification I certify that the inpatient psychiatric facility services furnished since the previous  certification were, and continue to be, medically necessary for, either, treatment which could reasonably be expected to improve the patient s condition or diagnostic study and that the hospital records indicate that the services furnished were, either, intensive treatment services, admission and related services necessary for diagnostic study, or equivalent services.     I certify that the patient continues to need, on a daily basis, active treatment furnished directly by or requiring the supervision of inpatient psychiatric facility personnel.   I estimate TBD days of hospitalization is necessary for proper treatment of the patient. My plans for post-hospital care for this patient are : Medications, appointments     Gillian Andrade MD

## 2021-12-09 NOTE — PROGRESS NOTES
"PSYCHIATRY PROGRESS NOTE         DATE OF SERVICE:   12/8/2021         CHIEF COMPLAINT:     Restlessness from  medications, decreased concentration, anxiety about discharge, last night agitation about food, medication adjustment          OBJECTIVE:     Nursing reports : Last night agitation about wanting more food, compliant with medications     reports working on outpatient referrals    Hospitalist consult by Star Mora APRN CNP on 12/2/2021  Elevated Blood Pressure without official hypertension diagnosis:  Elevated Blood Pressure without official hypertension diagnosis:  Dating back May 2021 per external facility problem list. Not on BP medication outpatient.  Amlodipine started during this admission. Increased Amlodipine dose 12/01  BP elevated prior to morning dose Amlodipine.   Recommend manual blood pressures for more accurate blood pressure readings.   PRN Hydralazine with parameters (inpatient use only)  Will need follow up outpatient after discharge.    # Self Inflicted Injury Right Inner Forearm:   # Puncture wound:  11/30  \" twisted pencil in healing scab area right inner forearm. Imaging negative for foreign body and fracture.  Keeps picking at sore. Cover with dressing. Encouraged not to pick skin.  Continue prophylaxis doxycycline. Oral probiotics to avoid abx associated diarrhea. Watch for new onset loose stool.          SUBJECTIVE:      Prakash says that he was angry last night because he did not get enough food.  He says he was hungry and did not want to eat food.  He wanted some water.  The nurse told him that there was no sun butter and he thought that there was.  He says that another patient got it.  He took oral Zyprexa as needed and he went to bed.  We discussed increased appetite on medications, and patients need to modify diet.  Statin is already overweight.  I discussed risk of diabetes and high cholesterol.  He has high blood pressure.  I discussed exercise with " him.  I encouraged him to walk the hallway or exercise when he is in the group home.  He says that he has leg restlessness from medications.  He agrees to take Benadryl.  We discussed Cogentin and propranolol.  He is already on metoprolol so adding propranolol may cause hypotension  He says that concentration is decreased and he needs higher dose of Vyvanse.  I told him that I would gradually increase it to 40 mg tomorrow and then I will discharge him back on 50 mg.  I explained again that no controlled substances will be prescribed from me and that he would have to call his psychiatrist to order controlled substances.  He later asked the  if someone could prescribe controlled substances for him.  I called his pharmacy.  Vyvanse and Klonopin was ordered on November 24.  Prakash says that he did not  the medications.   talked with his group home and the staff will go to the pharmacy to  medications.  Prkaash will not have access to medications.  He is on restricted pharmacy list.  He gets medications from [facility].  The group home will work on switching to pharmacy that only delivers medications.  He denies suicidal and homicidal ideas.  He says that this morning he had mumbling voices, no command no conversation, just like sounds.  He says it lasted for few hours and then it got resolved.  Energy fluctuates.  He takes medications.  He goes to groups.  No altercations with staff or patients.  I will order psychology consult.  .       MEDICATIONS:       - Psychiatric Emergency -   Does not apply See Admin Instructions     amLODIPine  10 mg Oral Daily     clindamycin   Topical BID     escitalopram  10 mg Oral Daily     fluPHENAZine decanoate  25 mg Intramuscular Q14 Days     gabapentin  1,200 mg Oral Daily     gabapentin  900 mg Oral BID     lactobacillus rhamnosus (GG)  1 capsule Oral BID     lidocaine   Topical BID 09 12     lisdexamfetamine  30 mg Oral Daily     lithium ER  300 mg  "Oral QAM     lithium ER  900 mg Oral At Bedtime     metoprolol succinate ER  12.5 mg Oral Daily     mirtazapine  15 mg Oral At Bedtime     nicotine  1 patch Transdermal Daily     nicotine   Transdermal Q8H     pantoprazole  40 mg Oral Daily     testosterone cypionate  0.2 mL Subcutaneous Q7 Days     ziprasidone  40 mg Oral BID w/meals     caffeine **AND** acetaminophen, alum & mag hydroxide-simethicone, clonazePAM, hydrALAZINE, hydrOXYzine, nicotine, OLANZapine **OR** OLANZapine, ondansetron, polyethylene glycol    Medication adherence: Yes  Medication side effects: Akathisia  Benefit: Symptom reduction         ROS:   As per history of present illness, right inner arm lump, otherwise reminder of review of systems is negative for: General, eyes, ears, nose, throat, neck, respiratory, cardiovascular, gastrointestinal, genitourinary, meniscal skeletal, neurological, hematological, dermatological and endocrine system.         MENTAL STATUS EXAM:   BP (!) 147/64 (Patient Position: Standing)   Pulse 114   Temp 97.8  F (36.6  C) (Oral)   Resp 16   Ht 1.651 m (5' 5\")   Wt 105.7 kg (233 lb 1.6 oz)   SpO2 96%   BMI 38.79 kg/m      Appearance:fair hygiene, cooperative  Orientation: Alert and oriented x3  Speech: Normal in rate and tone  Language ability: Normal syntax and vocabulary  Thought process: concrete  Thought content: This morning hallucinations, no command, no delusions  Associations: Connected  Suicidal Ideation: Denies  Homicidal Ideation: Denies  Mood: Mood lability  Affect: Anxious  Intellectual functioning:average  Fund of Knowledge: Consistent with education and experience  Attention/Concentration: decreased  Memory: intact  Psychomotor Behavior:  Mild agitation  Muscle Strength and Tone: no atrophy or involuntary movement  Gait and Station: steady  Insight and judgement: Impaired, going through revocation of provisional discharge, on extended commitment since 2019          LABS:   personally reviewed. "     11/26/2021  COVID-19 test negative  Lithium level 0.9  TSH 4.95  Free T4 0.87  Pregnancy test negative  Creatinine 0.78    Lab Results   Component Value Date     11/26/2021     10/06/2021     05/19/2021     01/10/2021     12/17/2020    CO2 26 11/26/2021    CO2 21 10/06/2021    CO2 19 05/19/2021    CO2 21 01/10/2021    CO2 19 12/17/2020    BUN 16 11/26/2021    BUN 12 10/06/2021    BUN 11 05/19/2021    BUN 13 01/10/2021    BUN 11 12/17/2020     No results found for: CKTOTAL, CKMB, TROPONINI  Lab Results   Component Value Date    WBC 12.6 11/26/2021    WBC 12.4 10/06/2021    WBC 13.1 05/19/2021    WBC 12.0 01/10/2021    WBC 12.4 12/15/2020    HGB 14.0 11/26/2021    HGB 13.1 10/06/2021    HGB 13.6 05/19/2021    HGB 12.2 03/01/2021    HGB 13.0 01/10/2021    HCT 42.6 11/26/2021    HCT 40.8 10/06/2021    HCT 41.6 05/19/2021    HCT 39.8 01/10/2021    HCT 38.9 12/15/2020    MCV 81 11/26/2021    MCV 82 10/06/2021    MCV 84 05/19/2021    MCV 83 01/10/2021    MCV 85 12/15/2020     11/26/2021     10/06/2021     05/19/2021     01/10/2021     12/18/2020     Lab Results   Component Value Date    CHOL 181 04/28/2021    CHOL 188 03/01/2021    CHOL 230 10/23/2019    TRIG 317 04/28/2021    TRIG 358 03/01/2021    TRIG 211 10/23/2019    HDL 37 04/28/2021    HDL 66 03/01/2021    HDL 58 10/23/2019     ECG on 11/30/2021  Systolic Blood Pressure mmHg       Diastolic Blood Pressure mmHg      Ventricular Rate BPM 76      Atrial Rate BPM 76      NV Interval ms 158      QRS Duration ms 98      QT ms 390      QTc ms 438      P Axis degrees 31      R AXIS degrees 25      T Axis degrees 27      Interpretation ECG  Sinus rhythm   Normal ECG     12/8/2021                Diastolic Blood Pressure mmHg            Ventricular Rate BPM 86  76          Atrial Rate BPM 86  76          NV Interval ms 176  158          QRS Duration ms 98  98          QT ms 372  390          QTc ms 445  438           P Axis degrees 32  31          R AXIS degrees 23  25          T Axis degrees 21  27          Interpretation ECG  Sinus rhythm   Normal ECG   When compared with ECG of 30-NOV-2021 11:28,   No significant change was found  Sinus rhythm   Normal ECG   When              Ref. Range 11/26/2021 23:03 11/27/2021 17:27 12/1/2021 13:41   Sodium Latest Ref Range: 133 - 144 mmol/L 137     Potassium Latest Ref Range: 3.4 - 5.3 mmol/L 3.6     Chloride Latest Ref Range: 94 - 109 mmol/L 105     Carbon Dioxide Latest Ref Range: 20 - 32 mmol/L 26     Urea Nitrogen Latest Ref Range: 7 - 30 mg/dL 16     Creatinine Latest Ref Range: 0.52 - 1.25 mg/dL 0.78     GFR Estimate Latest Ref Range: >60 mL/min/1.73m2 >90     Calcium Latest Ref Range: 8.5 - 10.1 mg/dL 10.1     Anion Gap Latest Ref Range: 3 - 14 mmol/L 6     HCG Qual Urine Latest Ref Range: Negative   Negative    T4 Free Latest Units: ng/dL 0.87     TSH Latest Ref Range: 0.40 - 4.00 mU/L 4.95 (H)     Glucose Latest Ref Range: 70 - 99 mg/dL 93     GLUCOSE BY METER POCT Latest Ref Range: 70 - 99 mg/dL   157 (H)   WBC Latest Ref Range: 4.0 - 11.0 10e3/uL 12.6 (H)     Hemoglobin Latest Ref Range: 11.7 - 17.7 g/dL 14.0     Hematocrit Latest Ref Range: 35.0 - 53.0 % 42.6     Platelet Count Latest Ref Range: 150 - 450 10e3/uL 358     RBC Count Latest Ref Range: 3.80 - 5.90 10e6/uL 5.27     MCV Latest Ref Range: 78 - 100 fL 81     MCH Latest Ref Range: 26.5 - 33.0 pg 26.6     MCHC Latest Ref Range: 31.5 - 36.5 g/dL 32.9     RDW Latest Ref Range: 10.0 - 15.0 % 14.9     % Neutrophils Latest Units: % 67     % Lymphocytes Latest Units: % 25     % Monocytes Latest Units: % 5     % Eosinophils Latest Units: % 2     % Basophils Latest Units: % 1     Absolute Basophils Latest Ref Range: 0.0 - 0.2 10e3/uL 0.1     Absolute Eosinophils Latest Ref Range: 0.0 - 0.7 10e3/uL 0.2     Absolute Immature Granulocytes Latest Ref Range: <=0.0 10e3/uL 0.0     Absolute Lymphocytes Latest Ref Range: 0.8 -  5.3 10e3/uL 3.2     Absolute Monocytes Latest Ref Range: 0.0 - 1.3 10e3/uL 0.7     % Immature Granulocytes Latest Units: % 0     Absolute Neutrophils Latest Ref Range: 1.6 - 8.3 10e3/uL 8.4 (H)     Absolute NRBCs Latest Units: 10e3/uL 0.0     NRBCs per 100 WBC Latest Ref Range: <1 /100 0     SARS CoV2 PCR Latest Ref Range: Negative  Negative     Lithium Level Latest Ref Range:   mmol/L 0.9         Ref. Range 12/8/2021 11:22   Sodium Latest Ref Range: 136 - 145 mmol/L 140   Potassium Latest Ref Range: 3.5 - 5.0 mmol/L 4.0   Chloride Latest Ref Range: 98 - 107 mmol/L 107   Carbon Dioxide Latest Ref Range: 22 - 31 mmol/L 24   Urea Nitrogen Latest Ref Range: 8 - 22 mg/dL 14   Creatinine Latest Ref Range: 0.60 - 1.30 mg/dL 0.74   GFR Estimate Latest Ref Range: >60 mL/min/1.73m2 >90   Calcium Latest Ref Range: 8.5 - 10.5 mg/dL 10.1   Anion Gap Latest Ref Range: 5 - 18 mmol/L 9   Troponin I Latest Ref Range: 0.00 - 0.29 ng/mL <0.01   Glucose Latest Ref Range: 70 - 125 mg/dL 83   WBC Latest Ref Range: 4.0 - 11.0 10e3/uL 9.3   Hemoglobin Latest Ref Range: 11.7 - 17.7 g/dL 13.8   Hematocrit Latest Ref Range: 35.0 - 53.0 % 42.4   Platelet Count Latest Ref Range: 150 - 450 10e3/uL 308   RBC Count Latest Ref Range: 3.80 - 5.90 10e6/uL 5.29   MCV Latest Ref Range: 78 - 100 fL 80   MCH Latest Ref Range: 26.5 - 33.0 pg 26.1 (L)   MCHC Latest Ref Range: 31.5 - 36.5 g/dL 32.5   RDW Latest Ref Range: 10.0 - 15.0 % 14.7   % Neutrophils Latest Units: % 62   % Lymphocytes Latest Units: % 27   % Monocytes Latest Units: % 8   % Eosinophils Latest Units: % 3   % Basophils Latest Units: % 0   Absolute Basophils Latest Ref Range: 0.0 - 0.2 10e3/uL 0.0   Absolute Eosinophils Latest Ref Range: 0.0 - 0.7 10e3/uL 0.3   Absolute Immature Granulocytes Latest Ref Range: <=0.4 10e3/uL 0.0   Absolute Lymphocytes Latest Ref Range: 0.8 - 5.3 10e3/uL 2.5   Absolute Monocytes Latest Ref Range: 0.0 - 1.3 10e3/uL 0.7   % Immature Granulocytes Latest Units:  % 0   Absolute Neutrophils Latest Ref Range: 1.6 - 8.3 10e3/uL 5.7   Absolute NRBCs Latest Units: 10e3/uL 0.0   NRBCs per 100 WBC Latest Ref Range: <1 /100 0     EXAM: US UPPER EXTREMITY NON VASCULAR RIGHT  LOCATION: [facility]  DATE/TIME: 12/8/2021 4:08 PM     INDICATION: assess lump/cyst medial aspect right upper arm  COMPARISON: None.  TECHNIQUE: Routine.   FINDINGS: Imaging performed over the antecubital fossa lump. The subcutaneous tissue contains an irregularly marginated 12 x 10 x 3 mm anechoic area with some scant peripheral blood flow. This could represent a small area of edema or scar, possibly from   prior vena puncture.  No abscess.         DIAGNOSIS:     Schizoaffective disorder chronic with acute exacerbation  Generalized anxiety disorder  Posttraumatic stress disorder  ADHD combined type  Akathisia  Borderline personality disorder  Marijuana dependency    Patient Active Problem List   Diagnosis     ADHD (attention deficit hyperactivity disorder)     Bipolar 1 disorder, manic, mild     Marijuana abuse     Polysubstance abuse (H)     GERD (gastroesophageal reflux disease)     Tobacco abuse     Abdominal pain, right upper quadrant     Intractable back pain     Optic neuritis     Cauda equina syndrome with neurogenic bladder (H)     Schizoaffective disorder, bipolar type (H)     PTSD (post-traumatic stress disorder)     Anxiety     Auditory hallucination     Class 2 obesity due to excess calories in adult     Nephrolithiasis     Cyst of left ovary     Borderline personality disorder (H)     Cannabis dependence (H)     Depression     Episodic mood disorder (H)     History of heroin abuse (H)     Moderate episode of recurrent major depressive disorder (H)     Opioid use disorder, severe, dependence (H)     Substance-induced psychotic disorder with hallucinations (H)     Nausea     Overdose     Suicidal ideation     Bella (H)     Spell of altered consciousness     Urinary retention      Obesity (BMI 35.0-39.9) with comorbidity (H)     Chronic bilateral low back pain without sciatica     AVA (generalized anxiety disorder)     Aggressive behavior     Gender identity disorder     Lumbosacral radiculopathy at L5     DUB (dysfunctional uterine bleeding)     Seizure-like activity (H)     Elevated blood pressure reading without diagnosis of hypertension     Acanthosis nigricans     Viral gastroenteritis     Prediabetes     Schizoaffective disorder, chronic condition with acute exacerbation (H)     Bipolar affective disorder, mixed, severe, with psychotic behavior (H)     Schizophrenia, schizoaffective, chronic with acute exacerbation (H)          PLAN:   Patient says that hallucinations are improving on Geodon.  He is under extended commitment since 2019.  Provisional discharge was revoked.    He had care conference with his group home and .  We discussed diagnosis and treatment plan and patient agrees with the following recommendations:    Medications:  Increase Vyvanse 40 mg daily for ADHD  Start Benadryl 50 mg nightly for extrapyramidal symptoms  Geodon 40 mg twice daily with food for mood and hallucinations  Remeron 15 mg nightly for sleep  Klonopin 1 mg twice daily as needed for anxiety  Prolixin Decanoate 25 mg IM every 14 days, given on 12/1/2021  Lithobid 300 mg every morning and 900 mg nightly  Neurontin 900 mg twice daily and 1200 mg midday  Lexapro 10 mg daily  Nicotine patch 21 mg daily  Continue nonpsychiatric medications:  Amlodipine 10 mg daily  Doxycycline 100 mg twice daily for 5 days completed  Protonix 40 mg daily  Depo testosterone 40 mg every 7 days, given on 12/1/2021  We discussed side effects, benefits and alternative treatments and patient agrees with capacity to do so.  Rule 25 for chemical dependency treatment   will collect collateral information and make outpatient referrals  Staff to provide emotional support and redirect as needed  Patient  encouraged to attend groups  Lab results: Reviewed personally  Consultation: Hospitalist consult completed  May wear hat for Faith reasons  Psychology consult  Risk Assessment: Off one-to-one, agrees not to hurt himself    Coordination of Care:   Patient seen, medical record reviewed, care coordinated with the team.    Total time: More than 35 minutes spent on this visit with more than 50% of time spent in coordination of care with staff, psychoeducation of the patient and providing supportive therapy regarding above symptoms..    This document is created with the help of Dragon dictation system.  All grammatical/typing errors or context distortion are unintentional and inherent to software.    Gillian Andrade MD        Re-Certification I certify that the inpatient psychiatric facility services furnished since the previous certification were, and continue to be, medically necessary for, either, treatment which could reasonably be expected to improve the patient s condition or diagnostic study and that the hospital records indicate that the services furnished were, either, intensive treatment services, admission and related services necessary for diagnostic study, or equivalent services.     I certify that the patient continues to need, on a daily basis, active treatment furnished directly by or requiring the supervision of inpatient psychiatric facility personnel.   I estimate TBD days of hospitalization is necessary for proper treatment of the patient. My plans for post-hospital care for this patient are : Medications, appointments     Gillian Andrade MD

## 2021-12-09 NOTE — TELEPHONE ENCOUNTER
Gabapentin 300MG CAPS   Last Written Prescription Date:  9/13/2021  Last Fill Quantity: 360,   # refills: 0  Last Office Visit : 10/27/2021  Future Office visit:  None    Routing refill request to provider for review/approval because:  Second Request  Not on refill protocol  Refer to [facility] as we do not fill medications for this clinic.        Darline Fritz RN  Central Triage Red Flags/Med Refills

## 2021-12-09 NOTE — PROGRESS NOTES
12/09/21 1512   Engagement   Intervention Group   Topic Detail OT: [employer] manual skills activity for attention/fine motor coord./organization/planning/attention to detail, creative expression and coping with stress/sxs   Attendance Attended   Patient Response Demonstrated understanding of materials provided;Was respectful;Positive attitude   Concentrated on Task 30 - 45 min   Cognition Goal-directed;Follows through with task   Mood/Affect Content;Pleasant   Social/Behavioral Cooperative;Engaged   Goals addressed in session today Pt is progressing towards goal; pt shared that he made a wood duck house when he was younger that he was proud of; worked in a goal directed manner; requested help with finding colors but followed a pattern independently and successfully; appeared pleased with his final project; prosocial throughout.

## 2021-12-09 NOTE — PROGRESS NOTES
12/09/21 1128   Engagement   Intervention Group   Topic Detail OT: Nail care to build sense of self-worth and autonomy, building connections with others, wellness strategies and coping with stress/sxs   Attendance Attended   Patient Response Needs reinforcement/repetition to learn materials;Was respectful;Prosocial behavior   Concentrated on Task 30 - 45 min   Cognition Goal-directed;Follows through with task;Poor attention to detail   Mood/Affect Content;Pleasant   Social/Behavioral Cooperative;Engaged;Motivated     Pt initially declined group and observed; pt commented that they had never done their nails; pt observed writer paint another patient's nails and then opted to join group; pt said they had never clipped their nails before; writer educated pt on how to clip nails and pt engaged independently but paid little attention to detail; writer painted pt's nails; pt's mood was congruent and pt was socially engaged; pt presents with an external locus of control but was receptive to writer challenging them to take some ownership of self-cares today.

## 2021-12-09 NOTE — PROGRESS NOTES
"[facility]    Medicine Progress Note - Hospitalist Service       Date of Admission:  11/27/2021    Brief Summary: Ayana \"Prakash\"  VINCENT Prasad is a 31 year old transgender male with preferred pronouns he/him who presented to [facility] emergency department for mental health evaluation, anxiety.  Patient lives in a group home and was brought in by a roommate.  His roommate reported that patient had been hearing more voices the last 2 days.  Past medical history includes schizoaffective disorder, bipolar type, ADHD, PTSD, polysubstance abuse.  Hospital medicine consult for evaluation after patient punched the wall with a right hand injury.       Assessment & Plan             #Atypical chest pain  -Left-sided chest pain which started yesterday.  Troponin negative, EKG with normal sinus rhythm, no ectopy.  Vital signs stable.  -Patient reports chest pain/heartburn in the past which was relieved with a GI cocktail.  I did repeat GI cocktail yesterday which did not relieve the pain.  I also gave a one-time dose of ibuprofen 600 mg which did help the pain somewhat.    -Patient is still reporting this left-sided chest pain radiating to the collarbone.  Slightly decreased from yesterday with pain 6 out of 10 and constant.  Patient reporting that the pain woke him up during the night.  May be secondary to anxiety.  Low suspicion for cardiac etiology, no hypotension.  Does not hurt when he takes a deep breath, no shortness of breath.  Low suspicion for pulmonary embolus  -Patient is on lithium so is not a good candidate for NSAIDs, but did have some relief with a one-time dose of ibuprofen yesterday.  Likely this chest pain is musculoskeletal.  Patient has Excedrin as needed for pain which he would like to try today instead of Tylenol or NSAIDs.  I have added lidocaine gel twice daily x3 days to this left chest area to see if that provides any pain relief.  -Patient participating in group activities, up " walking around the unit without difficulties and no signs of distress  -No further work-up at this time.  If pain does not improved consider D-Dimer blood test. Will follow up tomorrow    #Right upper arm cyst  #Left arm nodule  - Patient did not complain about right arm cyst today.  Ultrasound was done which was negative for abscess. CBC is within normal range with white blood cell count 9.3.  Afebrile.  I did not examine this site as patient was not complaining of pain here.  He has similar lesions on the left upper arm, may be acne cysts.  Antibiotics not indicated at this time.  Patient should follow-up with primary care provider after discharge    #Primary hypertension  #Sinus tachycardia  -Likely secondary to anxiety and psychiatric illness  -On low-dose metoprolol 12.5 mg daily.  Blood pressure 147/64 with pulse 104-114 bpm.  Ventricular rate on EKG was 86 bpm and regular  -Increase metoprolol ER to 25 mg daily     #Schizoaffective disorder, bipolar type  -Psychiatry managing       Diet: Regular Diet Adult    DVT Prophylaxis: Low Risk/Ambulatory with no VTE prophylaxis indicated  Styles Catheter: Not present  Central Lines: None  Code Status: Full Code      Disposition Plan per psychiatry    Total floor/unit time spent was 25 minutes in reviewing the record, medications, lab results and completing documentation. 15 minutes spent in counseling and discussion with patient regarding diagnosis, medications and treatment plan. Care discussed and coordinated with patient and nurse.     BROOKLYN [facility]  Hospitalist Service  [facility]  Securely message with the Vocera Web Console (learn more here)  Text page via [employer] Paging/Directory               ______________________________________________________________________    Interval History   I saw patient in the conference room.  Prior to this he was walking in the hallways without difficulty and in no apparent distress.  Patient  rates the left-sided chest pain 6 out of 10, yesterday the pain was 7 out of 10.  It is mostly constant with intermittent sharp pains, radiating to the left collarbone.  The sharp pain did wake him up during the night.  Patient felt that the GI cocktail yesterday did not help the pain but the 1 dose of ibuprofen did help.  He has been moving his left shoulder and arm and does not feel it is muscle strain and denies recent trauma to the chest or arm.  We discussed his cardiac work-up yesterday with troponin, electrolytes and EKG which were all negative.  No history of pulmonary embolus or DVT.  Chest pain does not hurt when he takes a deep breath.  Oxygen saturation stable at 96% on room air.  Hemodynamically stable with no hypotension.  Continues to be slightly tachycardic and hypertensive.  Denies fever, cough, shortness of breath, nausea, heartburn, constipation or loose stools.    Review of Systems: 10 point review of systems negative except for pertinent positives mentioned in HPI    Data reviewed today: I reviewed all medications, new labs and imaging results over the last 24 hours. I personally reviewed no images or EKG's today.    Physical Exam   Vital Signs: Temp: 97.8  F (36.6  C) Temp src: Oral BP: (!) 147/64 Pulse: 114   Resp: 16 SpO2: 96 % O2 Device: None (Room air)    Weight: 233 lbs 1.6 oz  General: Alert, calm, no apparent distress  HEENT: Normocephalic, atraumatic, mucous membranes pink and moist, oropharynx without exudate, no lymphadenopathy   Respiratory: Lung sounds clear bilaterally, non-labored  Cardiovascular: S1, S2, rhythm rate regular, negative murmur, negative lower extremity edema  Gastrointestinal: Bowel sounds positive x4, nondistended and non-tender  Musculoskeletal: No joint deformities  Neurological: Alert and oriented x3, speech intact, moves all extremities equally, sensation intact       Data   Recent Labs   Lab 12/08/21  1122   WBC 9.3   HGB 13.8   MCV 80          POTASSIUM 4.0   CHLORIDE 107   CO2 24   BUN 14   CR 0.74   ANIONGAP 9   WILLIAMS 10.1   GLC 83     Recent Results (from the past 24 hour(s))   US Upper Extremity Non Vascular Right    Narrative    EXAM: US UPPER EXTREMITY NON VASCULAR RIGHT  LOCATION: [facility]  DATE/TIME: 12/8/2021 4:08 PM    INDICATION: assess lump/cyst medial aspect right upper arm  COMPARISON: None.  TECHNIQUE: Routine.    FINDINGS: Imaging performed over the antecubital fossa lump. The subcutaneous tissue contains an irregularly marginated 12 x 10 x 3 mm anechoic area with some scant peripheral blood flow. This could represent a small area of edema or scar, possibly from   prior vena puncture.    No abscess.         Medications       - Psychiatric Emergency -   Does not apply See Admin Instructions     amLODIPine  10 mg Oral Daily     clindamycin   Topical BID     escitalopram  10 mg Oral Daily     fluPHENAZine decanoate  25 mg Intramuscular Q14 Days     gabapentin  1,200 mg Oral Daily     gabapentin  900 mg Oral BID     lactobacillus rhamnosus (GG)  1 capsule Oral BID     lidocaine   Topical BID 09 12     lisdexamfetamine  30 mg Oral Daily     lithium ER  300 mg Oral QAM     lithium ER  900 mg Oral At Bedtime     metoprolol succinate ER  12.5 mg Oral Daily     mirtazapine  15 mg Oral At Bedtime     nicotine  1 patch Transdermal Daily     nicotine   Transdermal Q8H     pantoprazole  40 mg Oral Daily     testosterone cypionate  0.2 mL Subcutaneous Q7 Days     ziprasidone  40 mg Oral BID w/meals

## 2021-12-10 VITALS
TEMPERATURE: 98.8 F | RESPIRATION RATE: 16 BRPM | WEIGHT: 233.1 LBS | DIASTOLIC BLOOD PRESSURE: 99 MMHG | SYSTOLIC BLOOD PRESSURE: 153 MMHG | HEART RATE: 98 BPM | OXYGEN SATURATION: 97 % | BODY MASS INDEX: 38.84 KG/M2 | HEIGHT: 65 IN

## 2021-12-10 PROCEDURE — 250N000013 HC RX MED GY IP 250 OP 250 PS 637: Performed by: PSYCHIATRY & NEUROLOGY

## 2021-12-10 PROCEDURE — 99239 HOSP IP/OBS DSCHRG MGMT >30: CPT | Performed by: PSYCHIATRY & NEUROLOGY

## 2021-12-10 PROCEDURE — 250N000011 HC RX IP 250 OP 636: Performed by: PSYCHIATRY & NEUROLOGY

## 2021-12-10 PROCEDURE — 250N000013 HC RX MED GY IP 250 OP 250 PS 637: Performed by: NURSE PRACTITIONER

## 2021-12-10 RX ORDER — ZIPRASIDONE HYDROCHLORIDE 40 MG/1
40 CAPSULE ORAL 2 TIMES DAILY WITH MEALS
Qty: 60 CAPSULE | Refills: 0 | Status: SHIPPED | OUTPATIENT
Start: 2021-12-10 | End: 2022-03-02 | Stop reason: DRUGHIGH

## 2021-12-10 RX ORDER — MIRTAZAPINE 15 MG/1
15 TABLET, FILM COATED ORAL AT BEDTIME
Qty: 30 TABLET | Refills: 0 | Status: SHIPPED | OUTPATIENT
Start: 2021-12-10 | End: 2021-12-22

## 2021-12-10 RX ORDER — METOPROLOL SUCCINATE 25 MG/1
12.5 TABLET, EXTENDED RELEASE ORAL DAILY
Qty: 15 TABLET | Refills: 0 | Status: SHIPPED | OUTPATIENT
Start: 2021-12-11 | End: 2022-01-06

## 2021-12-10 RX ORDER — DIPHENHYDRAMINE HCL 50 MG
50 CAPSULE ORAL AT BEDTIME
Qty: 30 CAPSULE | Refills: 0 | Status: SHIPPED | OUTPATIENT
Start: 2021-12-10 | End: 2022-01-21

## 2021-12-10 RX ORDER — AMLODIPINE BESYLATE 10 MG/1
10 TABLET ORAL DAILY
Qty: 30 TABLET | Refills: 0 | Status: SHIPPED | OUTPATIENT
Start: 2021-12-11 | End: 2022-01-06

## 2021-12-10 RX ORDER — LIDOCAINE 50 MG/G
OINTMENT TOPICAL 2 TIMES DAILY PRN
Qty: 30 G | Refills: 0 | Status: SHIPPED | OUTPATIENT
Start: 2021-12-10 | End: 2022-04-15

## 2021-12-10 RX ADMIN — ESCITALOPRAM OXALATE 10 MG: 10 TABLET ORAL at 08:09

## 2021-12-10 RX ADMIN — Medication 1 PATCH: at 08:16

## 2021-12-10 RX ADMIN — LISDEXAMFETAMINE DIMESYLATE 40 MG: 20 CAPSULE ORAL at 08:07

## 2021-12-10 RX ADMIN — CAFFEINE 100 MG: 200 TABLET ORAL at 07:40

## 2021-12-10 RX ADMIN — NICOTINE POLACRILEX 2 MG: 2 GUM, CHEWING BUCCAL at 03:07

## 2021-12-10 RX ADMIN — GABAPENTIN 900 MG: 300 CAPSULE ORAL at 08:08

## 2021-12-10 RX ADMIN — METOPROLOL SUCCINATE 12.5 MG: 25 TABLET, EXTENDED RELEASE ORAL at 08:08

## 2021-12-10 RX ADMIN — ZIPRASIDONE HCL 40 MG: 40 CAPSULE ORAL at 08:08

## 2021-12-10 RX ADMIN — NICOTINE POLACRILEX 2 MG: 2 GUM, CHEWING BUCCAL at 07:41

## 2021-12-10 RX ADMIN — ONDANSETRON 4 MG: 4 TABLET, ORALLY DISINTEGRATING ORAL at 11:27

## 2021-12-10 RX ADMIN — LITHIUM CARBONATE 300 MG: 300 TABLET, FILM COATED, EXTENDED RELEASE ORAL at 08:09

## 2021-12-10 RX ADMIN — AMLODIPINE BESYLATE 10 MG: 10 TABLET ORAL at 08:08

## 2021-12-10 RX ADMIN — Medication 1 CAPSULE: at 07:40

## 2021-12-10 RX ADMIN — CLONAZEPAM 1 MG: 1 TABLET ORAL at 11:27

## 2021-12-10 RX ADMIN — CLINDAMYCIN PHOSPHATE: 10 GEL TOPICAL at 08:10

## 2021-12-10 RX ADMIN — PANTOPRAZOLE SODIUM 40 MG: 40 TABLET, DELAYED RELEASE ORAL at 08:08

## 2021-12-10 RX ADMIN — NICOTINE POLACRILEX 2 MG: 2 GUM, CHEWING BUCCAL at 06:03

## 2021-12-10 RX ADMIN — NICOTINE POLACRILEX 2 MG: 2 GUM, CHEWING BUCCAL at 11:08

## 2021-12-10 RX ADMIN — NICOTINE POLACRILEX 2 MG: 2 GUM, CHEWING BUCCAL at 09:12

## 2021-12-10 RX ADMIN — CLONAZEPAM 1 MG: 1 TABLET ORAL at 03:15

## 2021-12-10 RX ADMIN — LIDOCAINE: 50 OINTMENT TOPICAL at 08:10

## 2021-12-10 NOTE — PLAN OF CARE
Problem: Suicidal Behavior  Goal: Suicidal Behavior is Absent or Managed  Outcome: Improving     Problem: Cognitive Impairment (Anxiety Signs/Symptoms)  Goal: Optimized Cognitive Function (Anxiety Signs/Symptoms)  Outcome: Improving    Pleasant and cooperative. Pt med compliant, claims right chest pain of 6/10. Gave Excedrin 1/2 tab and lidocaine ointment  at 1626- resulted with good effect.  Pt rates anxiety 2/10. Pt denies depression, SI, HI, hallucinations, and all other psych sx. Pt in common area, attends groups. Pt will be discharging tomorrow at noon.

## 2021-12-10 NOTE — TELEPHONE ENCOUNTER
Per Ayana @ [facility], an additional discharge provider needs to be added on the request. Nancy Muro has been added to the request, she would also like a covering provider to sign ASAP so that pt may receive their medications.   To Linh AMIN in absence of Becki Avery.

## 2021-12-10 NOTE — PLAN OF CARE
Problem: Sleep Disturbance (Anxiety Signs/Symptoms)  Goal: Improved Sleep (Anxiety Signs/Symptoms)  Outcome: Improving     Pt awake at 0300. Presented as pleasant, calm, and cooperative. Reported difficulty sleeping d/t looking forward to anticipated discharge today. PRN klonopin given per pt request. Pt sleeping at follow-up.    As of 0600, has slept ~ 6 hrs. Safety checks completed every 15 minutes. Self-injurious and assault precautions continued. Pt has been sleeping uneventfully.

## 2021-12-10 NOTE — DISCHARGE INSTRUCTIONS
Behavioral Discharge Planning and Instructions    Summary: You were admitted on 11/27/2021 due to {Mental Health 1:130351}.  You were treated by Dr. Andrade and discharged on 12/10/2021 from [facility] [address] to [facility]    Main Diagnosis: Schizophrenia    Health Care Follow-up:     Psychiatry Appointment  Date/Time: December 22nd 2pm  Provider: BROOKLYN Calvin, CNP    Therapy Appointment  Date/Time: Friday, December 17th at 8:30am  Provider: Joana Stanley [facility]  Left voicemail for Joshua Schaefer with [facility] to set up [facility] appointment  Follow up at [phone]    Attend all scheduled appointments with your outpatient providers. Call at least 24 hours in advance if you need to reschedule an appointment to ensure continued access to your outpatient providers.     Major Treatments, Procedures and Findings:  You were provided with: a psychiatric assessment, assessed for medical stability, medication evaluation and/or management, group therapy, milieu management and medical interventions    Symptoms to Report: feeling more aggressive, increased confusion, losing more sleep, mood getting worse or thoughts of suicide    Early warning signs can include: increased depression or anxiety sleep disturbances increased thoughts or behaviors of suicide or self-harm  increased unusual thinking, such as paranoia or hearing voices    Safety and Wellness:  Take all medicines as directed.  Make no changes unless your doctor suggests them.      Follow treatment recommendations.  Refrain from alcohol and non-prescribed drugs.  If there is a concern for safety, call 911.    Resources:   Crisis Intervention: 657.188.9461 or 713-024-0077 (TTY: 708.233.8643).  Call anytime for help.  National [place] on Mental Illness (www.[place].jhonatan.org): 499.152.4081 or 504-879-1870.  [place] Association for Children's Mental Health (www.macmh.org): 982.783.3962.  Alcoholics Anonymous  "(www.alcoholics-anonymous.org): Check your phone book for your local chapter.  Suicide Awareness Voices of Education (SAVE) (www.save.org): 222-263-JMTX (5069)  National Suicide Prevention Line (www.mentalhealthmn.org): 881-083-SWMA (4824)  Mental Health Consumer/Survivor Network of [place] (www.mhcsn.net): 537.101.2721 or 631-933-6631  Mental Health Association of [place] (www.mentalhealth.org): 968.114.7394 or 139-864-9399  Self- Management and Recovery Training., SMART-- Toll free: 916.787.1729  www.ForeUp.Geospiza  [facility] Crisis Response 220 227-3096  [facility] Crisis Response 430-916-7203  [facility] Crisis Response 598-828-2095  [facility] Crisis (COPE) Response - Adult [phone]  [facility] Crisis Response - Adult [phone]  [facility] Rapid Response [phone]  Text 4 Life: txt \"LIFE\" to 17004 for immediate support and crisis intervention  Crisis text line: Text \"[place]\" to 714090. Free, confidential, 24/7.  Crisis Intervention: 457.466.2243 or 587-577-3521. Call anytime for help.   [facility] Mental Health Crisis Team - Child: [phone]  [facility] Children's Mental Health Crisis Response Team - Child: [phone]  [facility] Mental Health Crisis: 1-173-940-8530   [facility] Mental Health Crisis Team:  [phone]  [facility], and [facility] Crisis Response Team (CRT):  [phone] or [phone]     General Medication Instructions:   See your medication sheet(s) for instructions.   Take all medicines as directed.  Make no changes unless your doctor suggests them.   Go to all your doctor visits.  Be sure to have all your required lab tests. This way, your medicines can be refilled on time.  Do not use any drugs not prescribed by your doctor.  Avoid alcohol.    Advance Directives:   Scanned document on file with [employer]? No scanned doc  Is document scanned? Pt unable to confirm  Honoring " Choices Your Rights Handout: Informed and given  Was more information offered? Pt declined    The Treatment team has appreciated the opportunity to work with you. If you have any questions or concerns about your recent admission, you can contact the unit which can receive your call 24 hours a day, 7 days a week. They will be able to get in touch with a Provider if needed. The unit number is [phone].

## 2021-12-10 NOTE — CONSULTS
"Psychology Psychotherapy  Note       Name:  Ayana Prasad  :  1990  MRN:  [id number]      Date: 12/10/2021  Duration:  60 minutes (9:35-10:35am)     Target Symptoms:    The patient was seen in light of concerns regarding symptoms of significant anxiety surrounding discharge today.     Participation:  The patient was able to participate and benefit from treatment as evidenced by his verbal expression of ideas and initiation of topics discussed.     Mental Status:  Level of Consciousness:  alert  Appearance:  adequately groomed  Attitude:  Attitude: open and cooperative  Speech: normal rate, tone, latency, and volume  Language ability: intact  Mood:  \"anxious\"  Affect: full range and appropriate and was congruent to speech  Suicidal Ideation: No  Homicidal Ideation: No  Thought formation: coherent and goal directed  Thought content:  Clear   Fundamentals of Knowledge: Average  Attention/Concentration: Easily distracted  Memory: recent and remote memory intact  Insight:  fair.  Judgement: fair  Orientation: Yes, x4  Psychomotor Behavior: restless and fidgety    Estimated IQ: IQ: average    These cognitive functions grossly appear as described, but were not formally tested.          Intervention:    Writer approached patient during OT group to meet for individual psychotherapy.  Patient was agreeable to meet in an office.  Patient described feeling very anxious about discharging and concerned about his ability to stay safe.  He has been in the hospital more this year than he has been out.  Patient has become somewhat institutionalized and said he would be happy to stay in the hospital the rest of his life.  Writer validated patient while also confronting him on this as he describes enjoying his freedom and wanting a place of his own.  Patient asserted only using methamphetamine 'in a crisis' and denied ever abusing his medications.  Writer observed how quickly a periodic crisis becomes monthly, then weekly and " then everyday.  Patient was acknowledging though did not admit to this explicitly.  Provided patient with psychoeducation on the use of skills and multiple stills at a time to manage emotions.  Patient stated he had not heard it like that before and this was helpful.  Discussed the risk of comparing it to substances and having that expectation.  Patient was able to identify risks of using substances as well as short and long term goals of improving his mental health.  Discussed behavioral activation and doing things despite not having motivation to do them.  Also discussed the benefit and importance of a routine (part of what he likes in the hospital and making friends in the hospital).  Provided patient with a list of coping skills and added some additional that are applicable to him including nitendo switch, walking the dog and woodworking.    Goals:   Have own place  Feel happy  Be more independent  Own a cat  Travel  Own and drive a car (obtain license)  Have sober, positive friends  Top surgery   See parents more often    Short term goals:  Get out of bed everyday  Daily hygiene  Space organized and clean  Want parents to be proud  Give [place] a good home  Be there for people/pets in his life (means not being in the hospital)  Reconcile relationship with girlfriend  Limit use of marijuana      Psychotherapeutic Techniques: Interpersonal Psychotherapy, Cognitive-behavioral therapy, motivational interviewing and supportive psychotherapy strategies were utilized.     Necessity:    The session was necessary for the care of the patient to address symptoms of significant anxiety surrounding discharge today.     Progress:    Patient has shown improvement in his ability to utilize coping skills to address symptoms of significant anxiety surrounding discharge today.       Plan:    Patient is scheduled to discharge today.     Diagnosis:    Schizoaffective Disorder  Post Traumatic Stress Disorder  Borderline Personality  Disorder       Provider: Michlele Alvarado PsyD, LP  Date: 12/10/2021
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
## 2021-12-10 NOTE — PLAN OF CARE
Discharge plan or goal: Back to group home with outpatient supports    Today's Plan: Prakash will be discharging home to his group home in [place] today, 12/10, at 12pm. Writer scheduled a medical cab ride to arrive at 12:00pm, number for [employer] is [phone]. His medications will be sent to [facility]; writer already called to lift the provider restriction yesterday. The group home staff will  the medications from the pharmacy to prevent Prakash from having direct access to the meds. Writer connected the  with Ayana from [facility] in order to coordinate switching the restricted pharmacy to [facility] moving forward. Prakash has follow up scheduled with psychiatry, therapy, ILS, [facility] and case management. Writer completed a provisional discharge with Prakash and will send it to his . Prakash idenies psych symptoms including SI and SIB at time of discharge.     Pt has the following outpatient appointment(s) scheduled:    Psychiatry Appointment  Date/Time: December 22nd 2pm  Provider: BROOKLYN Calvin, CNP    Therapy Appointment  Date/Time: Friday, December 17th at 8:30am  Provider: Joana [facility]  Left voicemail for Joshua Vierakelly with [facility] to set up [facility] appointment  Follow up at [phone]    Pt has the following professional community support(s): Case management, psychiatrist, therapist, ILS worker, [facility] worker, group home staff    Legal Status: Provisional Discharge    Diagnosis/Procedure:   Patient Active Problem List   Diagnosis    ADHD (attention deficit hyperactivity disorder)    Bipolar 1 disorder, manic, mild    Marijuana abuse    Polysubstance abuse (H)    GERD (gastroesophageal reflux disease)    Tobacco abuse    Abdominal pain, right upper quadrant    Intractable back pain    Optic neuritis    Cauda equina syndrome with neurogenic bladder (H)    Schizoaffective disorder, bipolar type (H)    PTSD  (post-traumatic stress disorder)    Anxiety    Auditory hallucination    Class 2 obesity due to excess calories in adult    Nephrolithiasis    Cyst of left ovary    Borderline personality disorder (H)    Cannabis dependence (H)    Depression    Episodic mood disorder (H)    History of heroin abuse (H)    Moderate episode of recurrent major depressive disorder (H)    Opioid use disorder, severe, dependence (H)    Substance-induced psychotic disorder with hallucinations (H)    Nausea    Overdose    Suicidal ideation    Bella (H)    Spell of altered consciousness    Urinary retention    Obesity (BMI 35.0-39.9) with comorbidity (H)    Chronic bilateral low back pain without sciatica    AVA (generalized anxiety disorder)    Aggressive behavior    Gender identity disorder    Lumbosacral radiculopathy at L5    DUB (dysfunctional uterine bleeding)    Seizure-like activity (H)    Elevated blood pressure reading without diagnosis of hypertension    Acanthosis nigricans    Viral gastroenteritis    Prediabetes    Schizoaffective disorder, chronic condition with acute exacerbation (H)    Bipolar affective disorder, mixed, severe, with psychotic behavior (H)    Schizophrenia, schizoaffective, chronic with acute exacerbation (H)    Akathisia       Care Rounds Attendance:   JONH   RN   Charge RN   OT/TR  MD/CNP    Jesenia Power [facility], 12/10/2021, 8:38 AM

## 2021-12-10 NOTE — PROGRESS NOTES
"[facility]    Medicine Progress Note - Hospitalist Service       Date of Admission:  11/27/2021    Assessment & Plan         Brief Summary: Ayana \"Prakash\"  VINCENT Prasad is a 31 year old transgender male with preferred pronouns he/him who presented to [facility] emergency department for mental health evaluation, anxiety.  Patient lives in a group home and was brought in by a roommate.  His roommate reported that patient had been hearing more voices the last 2 days.  Past medical history includes schizoaffective disorder, bipolar type, ADHD, PTSD, polysubstance abuse.  Hospital medicine following for multiple medical issues.     Medically cleared for discharge today. Discharge medication reconciliation for non-psychiatric medications completed. Rx electronically sent to [facility].        #Atypical chest pain  -Left-sided chest pain which started 2 days ago.  Troponin negative, EKG with normal sinus rhythm, no ectopy.  Vital signs stable.  -Pain 2 days ago was not relieved with GI cocktail or one-time dose of ibuprofen 600 mg. Tried Excedrin and lidocaine gel yesterday. Patient reporting pain improved today 2/10, previous 6-7/10 and feels the Excedrin helped the most. Asymptomatic with stable vital signs this morning.   -ok to discharge back to group home today    #Right upper arm cyst  #Left arm nodule  - Patient did not complain about right arm cyst today.  Ultrasound was done which was negative for abscess. CBC is within normal range with white blood cell count 9.3.  Afebrile.  I did not examine this site as patient was not complaining of pain here.  He has similar lesions on the left upper arm, may be acne cysts.  Antibiotics not indicated at this time.  Patient should follow-up with primary care provider after discharge    #Primary hypertension  #Sinus tachycardia  -Likely secondary to anxiety and psychiatric illness  -On low-dose metoprolol 12.5 mg daily.  Blood pressure 147/64 with pulse " 104-114 bpm.  Ventricular rate on EKG was 86 bpm and regular  -Recheck at PCP office in 1-2 weeks.     #Schizoaffective disorder, bipolar type  -Psychiatry managing     Diet: Regular Diet Adult    DVT Prophylaxis: Low Risk/Ambulatory with no VTE prophylaxis indicated  Styles Catheter: Not present  Central Lines: None  Code Status: Full Code      Disposition Plan   Expected Discharge: 12/10/2021     Anticipated discharge location: return to group home today    Total floor/unit time spent was 25 minutes in reviewing the record, medications, lab results and completing documentation. 15 minutes spent in counseling and discussion with patient regarding diagnosis, medications and treatment plan. Care discussed and coordinated with patient and nurse.     [facility]  Hospitalist Service  [facility]  Securely message with the Vocera Web Console (learn more here)  Text page via [employer] Paging/Directory                 ______________________________________________________________________    Interval History   Patient reporting left chest wall pain improved after taking Excedrin yesterday. The lidocaine gel topical did not help much. GI cocktail and 1 time ibuprofen dose the day before did not help. Current left chest wall pain rated 2/10. No shortness of breath or palpitations. No other complaints. Requesting Excedrin, PPI and blood pressure medicine for discharge.     Review of Systems: 10 point review of systems negative except for pertinent positives mentioned in HPI      Data reviewed today: I reviewed all medications, new labs and imaging results over the last 24 hours. I personally reviewed no images or EKG's today.    Physical Exam   Vital Signs: Temp: 98.8  F (37.1  C) Temp src: Oral BP: (!) 153/99 Pulse: 98   Resp: 16 SpO2: 97 % O2 Device: None (Room air)    Weight: 233 lbs 1.6 oz  General: Alert, calm, no apparent distress  HEENT: Normocephalic, atraumatic, mucous membranes pink  and moist, oropharynx without exudate, no lymphadenopathy   Respiratory: Lung sounds clear bilaterally, non-labored  Cardiovascular: S1, S2, rhythm rate regular, negative murmur, negative lower extremity edema  Gastrointestinal: Bowel sounds positive x4, nondistended and non-tender  Musculoskeletal: No joint deformities  Neurological: Alert and oriented x3, speech intact, moves all extremities equally, sensation intact    Data   Recent Labs   Lab 12/08/21  1122   WBC 9.3   HGB 13.8   MCV 80         POTASSIUM 4.0   CHLORIDE 107   CO2 24   BUN 14   CR 0.74   ANIONGAP 9   WILLIAMS 10.1   GLC 83     No results found for this or any previous visit (from the past 24 hour(s)).  Medications       - Psychiatric Emergency -   Does not apply See Admin Instructions     amLODIPine  10 mg Oral Daily     clindamycin   Topical BID     diphenhydrAMINE  50 mg Oral At Bedtime     escitalopram  10 mg Oral Daily     fluPHENAZine decanoate  25 mg Intramuscular Q14 Days     gabapentin  1,200 mg Oral Daily     gabapentin  900 mg Oral BID     lactobacillus rhamnosus (GG)  1 capsule Oral BID     lidocaine   Topical BID 09 12     lisdexamfetamine  40 mg Oral Daily     lithium ER  300 mg Oral QAM     lithium ER  900 mg Oral At Bedtime     metoprolol succinate ER  12.5 mg Oral Daily     mirtazapine  15 mg Oral At Bedtime     nicotine  1 patch Transdermal Daily     nicotine   Transdermal Q8H     pantoprazole  40 mg Oral Daily     testosterone cypionate  0.2 mL Subcutaneous Q7 Days     ziprasidone  40 mg Oral BID w/meals

## 2021-12-10 NOTE — PROGRESS NOTES
Pt discharged to group home via [facility] cab ride  @ noon. Pt verbalized ready to be discharged. PRN klonopin given as requested @ 1127 for anxiety. PRN Zofran given at the same time with relieve. Staff discussed discharge summary with pt including where to pick Meds @ [facility], and appointments. Pt acknowledged understanding.

## 2021-12-10 NOTE — TELEPHONE ENCOUNTER
[place] form has been signed by Linh Freeman & faxed to Toby @ [phone] and sent to scanning STAT. Ayana has been notified.
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
## 2021-12-10 NOTE — PLAN OF CARE
Occupational Therapy Discharge Note    Patient Name:  Ayana Prasad    D: Refer to daily doc flowsheets for details of progress toward goals.  A: Improvement seen in symptom management, self-management, insight and future orientation.   P: Patient discharging to group home with outpatient supports. Discontinue OT Care Plan goals and interventions.    Problem: Relapse Prevention  Goal: Engage in OT Group  Description: Engage in OT group activities that support recovery  Outcome: Completed     Date: 12/10/2021  Time: 1:47 PM

## 2021-12-15 ENCOUNTER — OFFICE VISIT (OUTPATIENT)
Dept: FAMILY MEDICINE | Facility: CLINIC | Age: 31
End: 2021-12-15
Payer: COMMERCIAL

## 2021-12-15 ENCOUNTER — TELEPHONE (OUTPATIENT)
Dept: FAMILY MEDICINE | Facility: CLINIC | Age: 31
End: 2021-12-15
Payer: COMMERCIAL

## 2021-12-15 VITALS
HEART RATE: 97 BPM | SYSTOLIC BLOOD PRESSURE: 124 MMHG | OXYGEN SATURATION: 98 % | WEIGHT: 233.2 LBS | TEMPERATURE: 98.7 F | DIASTOLIC BLOOD PRESSURE: 85 MMHG | BODY MASS INDEX: 38.81 KG/M2

## 2021-12-15 DIAGNOSIS — M54.9 INTRACTABLE BACK PAIN: Primary | ICD-10-CM

## 2021-12-15 DIAGNOSIS — I10 HYPERTENSION, UNSPECIFIED TYPE: ICD-10-CM

## 2021-12-15 DIAGNOSIS — F25.0 SCHIZOAFFECTIVE DISORDER, BIPOLAR TYPE (H): ICD-10-CM

## 2021-12-15 DIAGNOSIS — F19.10 POLYSUBSTANCE ABUSE (H): ICD-10-CM

## 2021-12-15 DIAGNOSIS — G83.4 CAUDA EQUINA SYNDROME WITH NEUROGENIC BLADDER (H): ICD-10-CM

## 2021-12-15 DIAGNOSIS — R73.03 PREDIABETES: ICD-10-CM

## 2021-12-15 DIAGNOSIS — F64.9 GENDER IDENTITY DISORDER: ICD-10-CM

## 2021-12-15 DIAGNOSIS — F12.20 CANNABIS DEPENDENCE (H): ICD-10-CM

## 2021-12-15 PROCEDURE — 99214 OFFICE O/P EST MOD 30 MIN: CPT | Performed by: NURSE PRACTITIONER

## 2021-12-15 RX ORDER — METHOCARBAMOL 500 MG/1
500 TABLET, FILM COATED ORAL 4 TIMES DAILY PRN
Qty: 120 TABLET | Refills: 0 | Status: SHIPPED | OUTPATIENT
Start: 2021-12-15 | End: 2022-04-15

## 2021-12-15 ASSESSMENT — PAIN SCALES - GENERAL: PAINLEVEL: SEVERE PAIN (6)

## 2021-12-15 NOTE — TELEPHONE ENCOUNTER
Left below orders on confidential voice mail for Car.  He is to call if any questions or concerns.  Jeanna Sidhu RN  [facility]

## 2021-12-15 NOTE — PATIENT INSTRUCTIONS
[facility] or [facility] has multiple locations that you can try to schedule your mammogram with them.
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
## 2021-12-16 ENCOUNTER — TELEPHONE (OUTPATIENT)
Dept: PALLIATIVE MEDICINE | Facility: CLINIC | Age: 31
End: 2021-12-16

## 2021-12-16 PROBLEM — I10 HYPERTENSION, UNSPECIFIED TYPE: Status: ACTIVE | Noted: 2021-12-16

## 2021-12-16 PROBLEM — R03.0 ELEVATED BLOOD PRESSURE READING WITHOUT DIAGNOSIS OF HYPERTENSION: Status: RESOLVED | Noted: 2021-05-05 | Resolved: 2021-12-16

## 2021-12-16 PROBLEM — R45.851 SUICIDAL IDEATION: Status: RESOLVED | Noted: 2019-04-09 | Resolved: 2021-12-16

## 2021-12-16 ASSESSMENT — ENCOUNTER SYMPTOMS
COUGH: 0
DYSPHORIC MOOD: 0
VOMITING: 0
ABDOMINAL DISTENTION: 0
NUMBNESS: 0
HEADACHES: 0
SLEEP DISTURBANCE: 0
SHORTNESS OF BREATH: 0
FATIGUE: 0
DIZZINESS: 0
SORE THROAT: 0
DIARRHEA: 0
LIGHT-HEADEDNESS: 0
ARTHRALGIAS: 0
BACK PAIN: 1
WHEEZING: 0
PALPITATIONS: 0
RHINORRHEA: 0
CHEST TIGHTNESS: 0
NERVOUS/ANXIOUS: 1
NAUSEA: 0
MYALGIAS: 0
CONSTIPATION: 0
ABDOMINAL PAIN: 0

## 2021-12-16 NOTE — TELEPHONE ENCOUNTER
Order received for a New Evaluation.    Are there any red flags that may impact the assessment or management of the patient?   Active or recent alcohol, drug or prescription medication misuse - use comments  Mental Illness / Communication Difficulties - use comments        Routing to review.    Vero MALDONADO    [facility]

## 2021-12-21 ENCOUNTER — MYC MEDICAL ADVICE (OUTPATIENT)
Dept: FAMILY MEDICINE | Facility: CLINIC | Age: 31
End: 2021-12-21
Payer: COMMERCIAL

## 2021-12-21 DIAGNOSIS — F19.10 POLYSUBSTANCE ABUSE (H): Primary | ICD-10-CM

## 2021-12-21 DIAGNOSIS — F25.0 SCHIZOAFFECTIVE DISORDER, BIPOLAR TYPE (H): ICD-10-CM

## 2021-12-21 DIAGNOSIS — F12.20 CANNABIS DEPENDENCE (H): ICD-10-CM

## 2021-12-22 ENCOUNTER — VIRTUAL VISIT (OUTPATIENT)
Dept: PSYCHIATRY | Facility: CLINIC | Age: 31
End: 2021-12-22
Attending: NURSE PRACTITIONER
Payer: COMMERCIAL

## 2021-12-22 ENCOUNTER — TELEPHONE (OUTPATIENT)
Dept: PSYCHIATRY | Facility: CLINIC | Age: 31
End: 2021-12-22
Payer: COMMERCIAL

## 2021-12-22 DIAGNOSIS — F99 INSOMNIA DUE TO OTHER MENTAL DISORDER: ICD-10-CM

## 2021-12-22 DIAGNOSIS — F90.2 ATTENTION DEFICIT HYPERACTIVITY DISORDER (ADHD), COMBINED TYPE: ICD-10-CM

## 2021-12-22 DIAGNOSIS — F25.0 SCHIZOAFFECTIVE DISORDER, BIPOLAR TYPE (H): ICD-10-CM

## 2021-12-22 DIAGNOSIS — F51.05 INSOMNIA DUE TO OTHER MENTAL DISORDER: ICD-10-CM

## 2021-12-22 DIAGNOSIS — F31.75 BIPOLAR DISORDER, IN PARTIAL REMISSION, MOST RECENT EPISODE DEPRESSED (H): ICD-10-CM

## 2021-12-22 DIAGNOSIS — F41.9 ANXIETY: Primary | ICD-10-CM

## 2021-12-22 PROCEDURE — 99215 OFFICE O/P EST HI 40 MIN: CPT | Mod: 95 | Performed by: NURSE PRACTITIONER

## 2021-12-22 RX ORDER — ZIPRASIDONE HYDROCHLORIDE 60 MG/1
60 CAPSULE ORAL EVERY EVENING
Qty: 30 CAPSULE | Refills: 1 | Status: SHIPPED | OUTPATIENT
Start: 2021-12-22 | End: 2022-01-21

## 2021-12-22 RX ORDER — LITHIUM CARBONATE 300 MG/1
TABLET, FILM COATED, EXTENDED RELEASE ORAL
Qty: 120 TABLET | Refills: 0 | Status: SHIPPED | OUTPATIENT
Start: 2021-12-22 | End: 2022-01-21

## 2021-12-22 RX ORDER — ZIPRASIDONE HYDROCHLORIDE 40 MG/1
40 CAPSULE ORAL EVERY MORNING
Qty: 30 CAPSULE | Refills: 1 | Status: SHIPPED | OUTPATIENT
Start: 2021-12-22 | End: 2022-01-21

## 2021-12-22 RX ORDER — CLONAZEPAM 1 MG/1
1 TABLET ORAL 2 TIMES DAILY PRN
Qty: 14 TABLET | Refills: 1 | Status: SHIPPED | OUTPATIENT
Start: 2021-12-22 | End: 2022-01-21

## 2021-12-22 RX ORDER — FLUPHENAZINE DECANOATE 25 MG/ML
INJECTION, SOLUTION INTRAMUSCULAR; SUBCUTANEOUS
Qty: 6 ML | Refills: 4 | Status: SHIPPED | OUTPATIENT
Start: 2021-12-22 | End: 2022-01-21

## 2021-12-22 RX ORDER — MIRTAZAPINE 15 MG/1
15 TABLET, FILM COATED ORAL AT BEDTIME
Qty: 30 TABLET | Refills: 0 | Status: SHIPPED | OUTPATIENT
Start: 2021-12-22 | End: 2022-01-21

## 2021-12-22 NOTE — TELEPHONE ENCOUNTER
On 12/22/21, at 8670, writer called patient at [phone] to confirm Virtual Visit. Writer unable to make contact with patient. Writer left detailed voice message for call back. [phone] left as call back number. Also an email for bowen was sent to [email or web address].  Francine Garcia, ASHLYN  ]

## 2021-12-23 ENCOUNTER — TELEPHONE (OUTPATIENT)
Dept: PSYCHIATRY | Facility: CLINIC | Age: 31
End: 2021-12-23
Payer: COMMERCIAL

## 2021-12-23 ENCOUNTER — TELEPHONE (OUTPATIENT)
Dept: FAMILY MEDICINE | Facility: CLINIC | Age: 31
End: 2021-12-23
Payer: COMMERCIAL
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
## 2021-12-23 NOTE — TELEPHONE ENCOUNTER
[facility] Call Center    Phone Message    May a detailed message be left on voicemail: yes     Reason for Call: Other: Nurse at  called regarding pt's pharmacy. Pt will continue using [facility], delivery only, for prescriptions. Not all of the pt's meds can be covered if they are sent to another pharmacy.      Action Taken: Other: Regine Last    Travel Screening: Not Applicable

## 2021-12-23 NOTE — PATIENT INSTRUCTIONS
-Increase evening Geodon to 60 mg.  Continue 40 mg in AM, but make sure to take it with protein shake/bar to maximize metabolism.  -Will continue all other medications for now.    Your next appointment is scheduled on 1/19/2022 (Wed) at 1pm.    Thank you for coming to the [facility] [place] [facility].    Lab Testing:  If you had lab testing today and your results are reassuring or normal they will be mailed to you or sent through [employer] within 7 days. If the lab tests need quick action we will call you with the results. The phone number we will call with results is # [phone] (home) . If this is not the best number please call our clinic and change the number.    Medication Refills:  If you need any refills please call your pharmacy and they will contact us. Our fax number for refills is [phone]. Please allow three business for refill processing. If you need to  your refill at a new pharmacy, please contact the new pharmacy directly. The new pharmacy will help you get your medications transferred.     Scheduling:  If you have any concerns about today's visit or wish to schedule another appointment please call our office during normal business hours [phone] (8-5:00 M-F)    Contact Us:  Please call [phone] during business hours (8-5:00 M-F).  If after clinic hours, or on the weekend, please call  [phone].    Financial Assistance [phone]  [facility] Billing [phone]  Central Billing Office, [facility]: [phone]  [place] Billing [phone]  Medical Records [phone]      MENTAL HEALTH CRISIS NUMBERS:  For a medical emergency please call  911 or go to the nearest ER.     [facility]:   [facility] -[phone]   Crisis Residence [facility] Residence -[phone]   Walk-In Counseling Center [facility] -[phone]   COPE 24/7 [place] Mobile Team -[phone] (adults)/[phone] (child)  CHILD: [facility] needs  assessment team - [phone]      [facility]:   [facility] - [phone]   Walk-in counseling [facility] House - [phone]   Walk-in counseling [facility] - [phone]   Crisis Residence [facility] Residence - [phone]  Urgent Care Adult Mental [place]-[phone] mobile unit/ 24/7 crisis line    National Crisis Numbers:   National Suicide Prevention Lifeline: 4-028-248-TALK (465-540-5713)  Poison Control Center - 3-091-338-2241  [employer]/resources for a list of additional resources (SOS)  Trans Lifeline a hotline for transgender people 4-263-613-7096  [employer] a hotline for LGBT youth 5-884-801-1531  Crisis Text Line: For any crisis 24/7   To: 684773  see www.crisistextline.org  - IF MAKING A CALL FEELS TOO HARD, send a text!         Again thank you for choosing [facility] MENTAL HEALTH & ADDICTION [facility] and please let us know how we can best partner with you to improve you and your family's health.    You may be receiving a survey regarding this appointment. We would love to have your feedback, both positive and negative. The survey is done by an external company, so your answers are anonymous.

## 2021-12-28 ENCOUNTER — TELEPHONE (OUTPATIENT)
Dept: PSYCHIATRY | Facility: CLINIC | Age: 31
End: 2021-12-28
Payer: COMMERCIAL

## 2021-12-28 NOTE — TELEPHONE ENCOUNTER
Called back CM.  CM noted that even if this writer does not support recommitment, the agency will. From pt's last encounter where pt agreed not to be recommitted, but immediately after the visit, contacted CM wanting to be revoked due to substance use,  Agreed to recommit pt with Ashley.  Will work on the paperwork and fax it over to CM as soon as possible.  Will continue same medication for Ashley.  Regine Last, CNP, 12/28/2021

## 2021-12-28 NOTE — TELEPHONE ENCOUNTER
Patricia Wylie Yukiko, [place] CNP  Cc: Mark Hammond, RN; Edward Stringer  Phone Number: [phone]     Jarad Weiner     Vero Arreaga, commitment [phone] called and said she is waiting to hear back about re-commitment / commitment about this patient. She said that she called last week  and hasn't hear back. CM requesting for follow up.     On pt's chart it says that she has an VAL from  21.       Thank you,   Patricia         =========================================            Writer received call from , who identified that:  > At the pt's recent hospitalization, the hospital didn't need to do a commitment because pt was on a commitment. The provisional discharge that they did was on the old commitment, which is about to .  > Vero needs to know if the provider is supporting re-commitment for pt. If so, the examiner's stmt and Ashley form (if needed) need to be completed. If not, she needs to know that.  > If the provider does not support the commitment, they will be pursing it by other means.  > The response is needed by end of 21.

## 2021-12-28 NOTE — TELEPHONE ENCOUNTER
Writer received completed forms from provider.  Faxed to [facility]  at [phone] via Epic communication.

## 2021-12-28 NOTE — TELEPHONE ENCOUNTER
Called back and LVM to CM.  Noted that inpatient initiated ex-parte order dated on 12/1/2021 to return to the facility.  Their discharge note indicated that pt was discharged with commitment and PD, but no other paperwork available.  No other paperwork found. Regine Last, NELLY, 12/28/2021

## 2021-12-29 ENCOUNTER — TELEPHONE (OUTPATIENT)
Dept: FAMILY MEDICINE | Facility: CLINIC | Age: 31
End: 2021-12-29
Payer: COMMERCIAL

## 2021-12-29 NOTE — TELEPHONE ENCOUNTER
Forms received from: [employer]   Phone number listed: [phone]   Fax listed: [phone]  Date received: 12/29/2021  Form description: tens unit  Once forms are completed, please return to [employer] via fax .  Form placed: in providers in mirian Shipman

## 2021-12-30 ENCOUNTER — TELEPHONE (OUTPATIENT)
Dept: FAMILY MEDICINE | Facility: CLINIC | Age: 31
End: 2021-12-30

## 2021-12-30 DIAGNOSIS — M54.9 INTRACTABLE BACK PAIN: ICD-10-CM

## 2021-12-30 DIAGNOSIS — F41.9 ANXIETY: ICD-10-CM

## 2021-12-30 DIAGNOSIS — F31.11 BIPOLAR 1 DISORDER, MANIC, MILD (H): ICD-10-CM

## 2021-12-30 NOTE — TELEPHONE ENCOUNTER
Forms received from: [facility]    Phone number listed: [phone]   Fax listed: [phone]  Date received: 12/30/2021  Form description: change order  Once forms are completed, please return to [facility]  via fax.  Form placed: in providers in mirian Shipman

## 2021-12-31 ENCOUNTER — MYC MEDICAL ADVICE (OUTPATIENT)
Dept: FAMILY MEDICINE | Facility: CLINIC | Age: 31
End: 2021-12-31
Payer: COMMERCIAL

## 2021-12-31 NOTE — TELEPHONE ENCOUNTER
Received another request for testosterone. Sent patient a Declara message requesting a call back to main clinic number to schedule an appointment before refilling testosterone.

## 2022-01-02 ENCOUNTER — MYC REFILL (OUTPATIENT)
Dept: PSYCHIATRY | Facility: CLINIC | Age: 32
End: 2022-01-02
Payer: COMMERCIAL

## 2022-01-02 DIAGNOSIS — F90.2 ATTENTION DEFICIT HYPERACTIVITY DISORDER (ADHD), COMBINED TYPE: ICD-10-CM

## 2022-01-02 RX ORDER — GABAPENTIN 300 MG/1
CAPSULE ORAL
Qty: 300 CAPSULE | Refills: 0 | Status: SHIPPED | OUTPATIENT
Start: 2022-01-02 | End: 2022-01-25

## 2022-01-03 RX ORDER — LISDEXAMFETAMINE DIMESYLATE 50 MG/1
CAPSULE ORAL
Qty: 30 CAPSULE | Refills: 0 | Status: SHIPPED | OUTPATIENT
Start: 2022-01-03 | End: 2022-01-21

## 2022-01-03 NOTE — TELEPHONE ENCOUNTER
Last Seen 12/22  RTC 1 month  Cancel None  No-Show None    Next Appt 1/19    Incoming Refill From patient via MyChart    Medication Requested   lisdexamfetamine (VYVANSE) 50 MG capsule    Directions   TAKE 1 CAPSULE BY MOUTH EVERY MORNING    Qty 30    Last Refill Per [place]  11/24 #30, 10/28 #30, 9/13 #30      Medication pended and routed to provider for approval.

## 2022-01-04 ENCOUNTER — DOCUMENTATION ONLY (OUTPATIENT)
Dept: BEHAVIORAL HEALTH | Facility: CLINIC | Age: 32
End: 2022-01-04
Payer: COMMERCIAL

## 2022-01-04 ENCOUNTER — TELEPHONE (OUTPATIENT)
Dept: PSYCHIATRY | Facility: CLINIC | Age: 32
End: 2022-01-04
Payer: COMMERCIAL

## 2022-01-04 NOTE — TELEPHONE ENCOUNTER
Called   Carmen Clay    Acting --Adult Services    [facility] Attorney s Office    [address] [facility]    [address] [place]  [address]    Phone: [phone]    Fax:     [phone]    To inquire about revision date and names that needs to be used for extension of commitment and Ashley order.  Regine Last, NELLY, 1/4/2022

## 2022-01-04 NOTE — TELEPHONE ENCOUNTER
"Writer received incoming call from Vero BARTHOLOMEW at  Resources requesting a urgent response. She is working with commitment mac order. Provider completed forms for Examiner statement in support of petition for commitment as well as Neuroleptic medication note for Mac. Court is requesting few changes to the form.     - Patient's last name is needing to be changed   - If provider support patient is needing Mac order a correction is needed on the Exam statement last question needs to be changed.    - Will this patient need neuroleptic meds: should be \"no\"   - Does this person have sufficient awareness of their situation and understanding of treament with neuroleptic to make this decision for themselves: should be \"no\"   - They are request revisen on Neuroleptic Medication Note For Mac Proceedings to reflect that c: Specifically, a, b, and c of #12 need to be  NO  and currently they all say  Yes.     Vero can be reached at [phone]    Routed to provider as high priority     "

## 2022-01-04 NOTE — TELEPHONE ENCOUNTER
Writer returned a call to Vero BARTHOLOMEW at  Resources to confirm patients full name needing to be on the form. No answer at number provided. LVM, requesting a call back. Clinic number provided.

## 2022-01-04 NOTE — TELEPHONE ENCOUNTER
E-mail received from FLORIDA Pham at  Resources:     From: Carmen Clay [email or web address]>  Sent: Tuesday, January 4, 2022 1:51 PM  To: Vero Arreaga <[email or web address]>  Cc: Carmen Clay <[email or web address]  Subject: Prakash Prasad - Exam Statement     This message was sent from outside of the organization. Please do not click links or open attachments unless you recognize the source of this email and know the content is safe.      Reggie Pham!         Thanks much for your assistance with the Exam Statement for Mr. Prasad.         Currently the Exam Statement indicates a Ashley Order is not needed.         If this is fine as is, with Ms. Last indicating that no Ashley Order is needed, and that MI commit is sufficient, she need not change anything.  We can file the Exam Statement and not the Ashley paperwork and pursue MI recommitment.         If she feels a Ashley Order is warranted, she will have to submit a corrected Exam Statement ASAP that answers YES to needs med and NO to does this person have a sufficient awareness and understanding . Currently they both say Yes.         She will also have to revise her Neuroleptic Medication Note For Ashley Proceedings to reflect that c: Specifically, a, b, and c of #12 need to be  NO  and currently they all say  Yes.          Feel free to call me with questions.         I have to have this sent for filing by end of day tomorrow.         Updated paperwork can be faxed to me at [phone].         Carmen Clay    Acting --Adult Services    [facility] Attorney s Office    [address] [facility]    [address] [place]  [address]    Phone: [phone]    Fax:     [phone]    [email or web address]

## 2022-01-04 NOTE — PROGRESS NOTES
Prakash was scheduled for a video call this date at 9 am.  It appeared she was  In the room however provider was not able to connect.  Provider sent a SIM message to her with a link to connect however again there was no connection.  This provider telephoned Prakash at [phone] amd a message came on that the person I was calling could not accept calls at this time.  There was no option to leave a message.   Chapincito Vasquez, BOBSW

## 2022-01-04 NOTE — TELEPHONE ENCOUNTER
Writer received an email from Vero BARTHOLOMEW with below clarification:   - Full name of the patient: Prakash Prasad  - Date on the form is needing to be still the same of 12/28 at 1330     Routed to Jaelyn ANDERSON LPN

## 2022-01-05 NOTE — TELEPHONE ENCOUNTER
"UPDATE: writer received incoming call from Vero who needed forms corrected. Answer to \"Will this person need neuroleptic medications?\" was changed to \"Yes\" per request. Additionally, on question #12, d) was amended to say \"medication followup appointments poor. Lack of medication adherence.\" This was faxed and emailed .Jaelyn Mascorro LPN     [facility] [phone], who requested completed forms be emailed to her at [email or web address]. Writer emailed these forms while still on the phone and Vero did confirm receipt of this email. Vero also requested copies be faxed to Carmen Clay at the [facility]. [facility] at [phone]. Writer agreed and fax was sent. The original copies were faxed to scanning and copies are held until scanning is verified.Jaelyn Mascorro LPN    "

## 2022-01-07 LAB
ATRIAL RATE - MUSE: 86 BPM
DIASTOLIC BLOOD PRESSURE - MUSE: NORMAL MMHG
INTERPRETATION ECG - MUSE: NORMAL
P AXIS - MUSE: 32 DEGREES
PR INTERVAL - MUSE: 176 MS
QRS DURATION - MUSE: 98 MS
QT - MUSE: 372 MS
QTC - MUSE: 445 MS
R AXIS - MUSE: 23 DEGREES
SYSTOLIC BLOOD PRESSURE - MUSE: NORMAL MMHG
T AXIS - MUSE: 21 DEGREES
VENTRICULAR RATE- MUSE: 86 BPM

## 2022-01-07 NOTE — TELEPHONE ENCOUNTER
Testosterone refill declined.  Patient with significant mental health issues and recent inpatient psychiatric hospitalization at [facility] in November through December 2021.  During his hospitalization he did have some changes made to his psychiatric medications.  Did recently see his psychiatrist and there were notes about concerns regarding ongoing polysubstance use.  Notably, Prakash does hear voices at baseline and we have been monitoring closely for any worsening of these with his recent initiation of testosterone.    Given these developments, I recommend holding off on restarting testosterone at this point.  We can discuss further at his upcoming appointment with me.    Glenda Juan MD

## 2022-01-14 ENCOUNTER — TELEPHONE (OUTPATIENT)
Dept: FAMILY MEDICINE | Facility: CLINIC | Age: 32
End: 2022-01-14
Payer: COMMERCIAL

## 2022-01-16 ENCOUNTER — HOSPITAL ENCOUNTER (EMERGENCY)
Facility: CLINIC | Age: 32
Discharge: HOME | End: 2022-01-16
Attending: EMERGENCY MEDICINE | Admitting: EMERGENCY MEDICINE
Payer: COMMERCIAL

## 2022-01-16 ENCOUNTER — APPOINTMENT (OUTPATIENT)
Dept: CT IMAGING | Facility: CLINIC | Age: 32
End: 2022-01-16
Attending: EMERGENCY MEDICINE
Payer: COMMERCIAL

## 2022-01-16 VITALS
BODY MASS INDEX: 36.96 KG/M2 | TEMPERATURE: 98.7 F | WEIGHT: 230 LBS | DIASTOLIC BLOOD PRESSURE: 114 MMHG | SYSTOLIC BLOOD PRESSURE: 161 MMHG | OXYGEN SATURATION: 95 % | RESPIRATION RATE: 18 BRPM | HEIGHT: 66 IN | HEART RATE: 94 BPM

## 2022-01-16 DIAGNOSIS — N20.1 LEFT URETERAL CALCULUS: ICD-10-CM

## 2022-01-16 LAB
ALBUMIN UR-MCNC: NEGATIVE MG/DL
ANION GAP SERPL CALCULATED.3IONS-SCNC: 5 MMOL/L (ref 3–14)
APPEARANCE UR: ABNORMAL
BASOPHILS # BLD AUTO: 0.1 10E3/UL (ref 0–0.2)
BASOPHILS NFR BLD AUTO: 0 %
BILIRUB UR QL STRIP: NEGATIVE
BUN SERPL-MCNC: 9 MG/DL (ref 7–30)
CALCIUM SERPL-MCNC: 9.1 MG/DL (ref 8.5–10.1)
CHLORIDE BLD-SCNC: 104 MMOL/L (ref 94–109)
CO2 SERPL-SCNC: 26 MMOL/L (ref 20–32)
COLOR UR AUTO: ABNORMAL
CREAT SERPL-MCNC: 1.04 MG/DL (ref 0.52–1.25)
EOSINOPHIL # BLD AUTO: 0.3 10E3/UL (ref 0–0.7)
EOSINOPHIL NFR BLD AUTO: 2 %
ERYTHROCYTE [DISTWIDTH] IN BLOOD BY AUTOMATED COUNT: 15.7 % (ref 10–15)
GFR SERPL CREATININE-BSD FRML MDRD: 73 ML/MIN/1.73M2
GLUCOSE BLD-MCNC: 133 MG/DL (ref 70–99)
GLUCOSE UR STRIP-MCNC: NEGATIVE MG/DL
HCT VFR BLD AUTO: 41.9 % (ref 35–53)
HGB BLD-MCNC: 13.9 G/DL (ref 11.7–17.7)
HGB UR QL STRIP: ABNORMAL
IMM GRANULOCYTES # BLD: 0.1 10E3/UL
IMM GRANULOCYTES NFR BLD: 1 %
KETONES UR STRIP-MCNC: NEGATIVE MG/DL
LEUKOCYTE ESTERASE UR QL STRIP: ABNORMAL
LIPASE SERPL-CCNC: 144 U/L (ref 73–393)
LYMPHOCYTES # BLD AUTO: 2.4 10E3/UL (ref 0.8–5.3)
LYMPHOCYTES NFR BLD AUTO: 18 %
MCH RBC QN AUTO: 26.8 PG (ref 26.5–33)
MCHC RBC AUTO-ENTMCNC: 33.2 G/DL (ref 31.5–36.5)
MCV RBC AUTO: 81 FL (ref 78–100)
MONOCYTES # BLD AUTO: 0.8 10E3/UL (ref 0–1.3)
MONOCYTES NFR BLD AUTO: 6 %
MUCOUS THREADS #/AREA URNS LPF: PRESENT /LPF
NEUTROPHILS # BLD AUTO: 9.8 10E3/UL (ref 1.6–8.3)
NEUTROPHILS NFR BLD AUTO: 73 %
NITRATE UR QL: NEGATIVE
NRBC # BLD AUTO: 0 10E3/UL
NRBC BLD AUTO-RTO: 0 /100
PH UR STRIP: 7 [PH] (ref 5–7)
PLATELET # BLD AUTO: 321 10E3/UL (ref 150–450)
POTASSIUM BLD-SCNC: 3.6 MMOL/L (ref 3.4–5.3)
RBC # BLD AUTO: 5.18 10E6/UL (ref 3.8–5.9)
RBC URINE: 43 /HPF
SODIUM SERPL-SCNC: 135 MMOL/L (ref 133–144)
SP GR UR STRIP: 1 (ref 1–1.03)
SQUAMOUS EPITHELIAL: 7 /HPF
UROBILINOGEN UR STRIP-MCNC: NORMAL MG/DL
WBC # BLD AUTO: 13.5 10E3/UL (ref 4–11)
WBC URINE: 29 /HPF

## 2022-01-16 PROCEDURE — 82310 ASSAY OF CALCIUM: CPT | Performed by: EMERGENCY MEDICINE

## 2022-01-16 PROCEDURE — 36415 COLL VENOUS BLD VENIPUNCTURE: CPT | Performed by: EMERGENCY MEDICINE

## 2022-01-16 PROCEDURE — 74176 CT ABD & PELVIS W/O CONTRAST: CPT

## 2022-01-16 PROCEDURE — 99285 EMERGENCY DEPT VISIT HI MDM: CPT | Mod: 25

## 2022-01-16 PROCEDURE — 96375 TX/PRO/DX INJ NEW DRUG ADDON: CPT

## 2022-01-16 PROCEDURE — 96374 THER/PROPH/DIAG INJ IV PUSH: CPT

## 2022-01-16 PROCEDURE — 81001 URINALYSIS AUTO W/SCOPE: CPT | Performed by: EMERGENCY MEDICINE

## 2022-01-16 PROCEDURE — 250N000011 HC RX IP 250 OP 636: Performed by: EMERGENCY MEDICINE

## 2022-01-16 PROCEDURE — 258N000003 HC RX IP 258 OP 636: Performed by: EMERGENCY MEDICINE

## 2022-01-16 PROCEDURE — 85025 COMPLETE CBC W/AUTO DIFF WBC: CPT | Performed by: EMERGENCY MEDICINE

## 2022-01-16 PROCEDURE — 87086 URINE CULTURE/COLONY COUNT: CPT | Performed by: EMERGENCY MEDICINE

## 2022-01-16 PROCEDURE — 83690 ASSAY OF LIPASE: CPT | Performed by: EMERGENCY MEDICINE

## 2022-01-16 PROCEDURE — 96361 HYDRATE IV INFUSION ADD-ON: CPT

## 2022-01-16 RX ORDER — OXYCODONE AND ACETAMINOPHEN 5; 325 MG/1; MG/1
1-2 TABLET ORAL EVERY 4 HOURS PRN
Qty: 12 TABLET | Refills: 0 | Status: CANCELLED | OUTPATIENT
Start: 2022-01-16

## 2022-01-16 RX ORDER — ONDANSETRON 4 MG/1
4 TABLET, ORALLY DISINTEGRATING ORAL EVERY 6 HOURS PRN
Qty: 15 TABLET | Refills: 0 | Status: CANCELLED | OUTPATIENT
Start: 2022-01-16

## 2022-01-16 RX ORDER — OXYCODONE AND ACETAMINOPHEN 5; 325 MG/1; MG/1
1-2 TABLET ORAL EVERY 4 HOURS PRN
Qty: 12 TABLET | Refills: 0 | Status: SHIPPED | OUTPATIENT
Start: 2022-01-16 | End: 2022-01-18

## 2022-01-16 RX ORDER — ONDANSETRON 4 MG/1
4 TABLET, ORALLY DISINTEGRATING ORAL EVERY 6 HOURS PRN
Qty: 15 TABLET | Refills: 0 | Status: SHIPPED | OUTPATIENT
Start: 2022-01-16 | End: 2022-01-18

## 2022-01-16 RX ORDER — KETOROLAC TROMETHAMINE 15 MG/ML
15 INJECTION, SOLUTION INTRAMUSCULAR; INTRAVENOUS ONCE
Status: COMPLETED | OUTPATIENT
Start: 2022-01-16 | End: 2022-01-16

## 2022-01-16 RX ORDER — HYDROMORPHONE HYDROCHLORIDE 1 MG/ML
0.5 INJECTION, SOLUTION INTRAMUSCULAR; INTRAVENOUS; SUBCUTANEOUS EVERY 30 MIN PRN
Status: DISCONTINUED | OUTPATIENT
Start: 2022-01-16 | End: 2022-01-16 | Stop reason: HOSPADM

## 2022-01-16 RX ORDER — TAMSULOSIN HYDROCHLORIDE 0.4 MG/1
0.4 CAPSULE ORAL DAILY
Qty: 5 CAPSULE | Refills: 0 | Status: SHIPPED | OUTPATIENT
Start: 2022-01-16 | End: 2022-01-18

## 2022-01-16 RX ORDER — ONDANSETRON 2 MG/ML
4 INJECTION INTRAMUSCULAR; INTRAVENOUS ONCE
Status: COMPLETED | OUTPATIENT
Start: 2022-01-16 | End: 2022-01-16

## 2022-01-16 RX ADMIN — ONDANSETRON 4 MG: 2 INJECTION INTRAMUSCULAR; INTRAVENOUS at 09:20

## 2022-01-16 RX ADMIN — KETOROLAC TROMETHAMINE 15 MG: 15 INJECTION, SOLUTION INTRAMUSCULAR; INTRAVENOUS at 09:20

## 2022-01-16 RX ADMIN — SODIUM CHLORIDE 1000 ML: 9 INJECTION, SOLUTION INTRAVENOUS at 09:17

## 2022-01-16 RX ADMIN — HYDROMORPHONE HYDROCHLORIDE 0.5 MG: 1 INJECTION, SOLUTION INTRAMUSCULAR; INTRAVENOUS; SUBCUTANEOUS at 10:29

## 2022-01-16 ASSESSMENT — ENCOUNTER SYMPTOMS
NAUSEA: 1
FLANK PAIN: 1
WEAKNESS: 0
COUGH: 0
DIARRHEA: 0
NUMBNESS: 0
DIFFICULTY URINATING: 0
DYSURIA: 0
SHORTNESS OF BREATH: 0
CHILLS: 1
FEVER: 0
VOMITING: 0
HEMATURIA: 0
ABDOMINAL PAIN: 0
FREQUENCY: 0

## 2022-01-16 ASSESSMENT — MIFFLIN-ST. JEOR: SCORE: 1775.02

## 2022-01-16 NOTE — ED PROVIDER NOTES
History   Chief Complaint:  Flank Pain       HPI   Ayana Prasad is a 31 year old adult with history of kidney stones, cauda equina syndrome, chronic low back pain, and TBI who presents via EMS with sudden severe left flank pain that woke him out of sleep around 630 this morning. He notes associated chills and nausea. Denies fever, vomiting, diarrhea, cough, chest or abdominal pain, shortness of breath, or urinary symptoms. Denies any numbness or weakness of his legs. No chance of pregnancy. Denies any known Covid exposure.     Review of Systems   Constitutional: Positive for chills. Negative for fever.   Respiratory: Negative for cough and shortness of breath.    Cardiovascular: Negative for chest pain.   Gastrointestinal: Positive for nausea. Negative for abdominal pain, diarrhea and vomiting.   Genitourinary: Positive for flank pain. Negative for difficulty urinating, dysuria, frequency, hematuria and urgency.   Neurological: Negative for weakness and numbness.   All other systems reviewed and are negative.    Allergies:  Haldol [Haloperidol]  Adhesive Tape  Percocet [Oxycodone-Acetaminophen]  Prednisone  Risperidone  Tramadol Hcl  Droperidol  Seroquel [Quetiapine]    Medications:  Norvasc  Klonopin  Benadryl  Lexapro  Prolixin  Neurontin  Vyvanse  Lithobid  Robaxin  Toprol  Remeron  Narcan  Prilosec  Zofran  Depotestosterone  Geodon    Past Medical History:     ADHD  Bipolar 1 disorder  Borderline personality disorder  Cauda equina syndrome  Depression  GERD  TBI  Hypertension  Marginal corneal ulcer  Nephrolithiasis  Polysubstance abuse  PTSD  Optic neuritis  Anxiety  Bella  Gender identity disorder  Seizure-like activity  Prediabetes  Schizophrenia  Pyelonephritis    Past Surgical History:    L5-S1 decompression discectomy  Colonoscopy  Cystoscopy, insert stent ureters x4  Cystoscopy ureteroscopy  ENT surgery  EGD combined x3  Cholecystectomy  Laser holmium lithotripsy ureter  Transurethral stone  "resection  Tympanostomy    Family History:    Father: Crohn's disease, liver cancer  Mother: Hyperlipidemia, anxiety  Brother: Hypertension, Diabetes, cancer    Social History:  The patient presents to the ED alone via EMS.     Physical Exam     Patient Vitals for the past 24 hrs:   BP Temp Temp src Pulse Resp SpO2 Height Weight   01/16/22 1100 (!) 161/114 -- -- 94 -- 92 % -- --   01/16/22 1045 (!) 147/101 -- -- 98 -- 93 % -- --   01/16/22 1030 (!) 136/124 -- -- 101 -- 96 % -- --   01/16/22 0829 (!) 149/92 98.7  F (37.1  C) Oral 100 18 96 % 1.676 m (5' 6\") 104.3 kg (230 lb)       Physical Exam   Nursing note and vitals reviewed.  Constitutional:  Appears well-developed and well-nourished. Appears uncomfortable.  HENT:   Head:    Atraumatic.   Mouth/Throat:   Oropharynx is clear and moist. No oropharyngeal exudate.   Eyes:    Pupils are equal, round, and reactive to light.   Neck:    Normal range of motion. Neck supple.      No tracheal deviation present. No thyromegaly present.   Cardiovascular:  Normal rate, regular rhythm, no murmur   Pulmonary/Chest: Breath sounds are clear and equal without wheezes or crackles.  Abdominal:   Soft. Bowel sounds are normal. Exhibits no distension and      no mass. There is no tenderness. No CVA tenderness.     There is no rebound and no guarding.   Musculoskeletal:  Exhibits no edema.   Lymphadenopathy:  No cervical adenopathy.   Neurological:   Alert and oriented to person, place, and time.     GCS 15.  CN 2-12 intact.  and proximal upper extremity strength strong and    equal.  Bilateral lower extremity strength strong and equal, including strong    dorsiflexion and plantarflexion strength.  Sensation intact and equal to the face,    arms and legs.  No facial droop or weakness. Normal speech.  Follows commands    and answers questions normally.        Normal saddle area sensation. DTRs 2+/= to bilateral patella and achilles.   Skin:    Skin is warm and dry. No rash noted. " No pallor.     Emergency Department Course     Imaging:  CT Abdomen Pelvis w/o Contrast   Final Result   IMPRESSION:    1.  A 5 x 4 x 4 mm calculus at the left UPJ results in mild hydronephrosis.   2.  Additional nonobstructing calyceal stones in both kidneys are unchanged from 10/7/2021.   3.  Marked fatty infiltration of the liver.           Report per radiology    Laboratory:  Labs Ordered and Resulted from Time of ED Arrival to Time of ED Departure   UA MACROSCOPIC WITH REFLEX TO MICRO AND CULTURE - Abnormal       Result Value    Color Urine Straw      Appearance Urine Slightly Cloudy (*)     Glucose Urine Negative      Bilirubin Urine Negative      Ketones Urine Negative      Specific Gravity Urine 1.005      Blood Urine Moderate (*)     pH Urine 7.0      Protein Albumin Urine Negative      Urobilinogen Urine Normal      Nitrite Urine Negative      Leukocyte Esterase Urine Moderate (*)     Mucus Urine Present (*)     RBC Urine 43 (*)     WBC Urine 29 (*)     Squamous Epithelials Urine 7 (*)    BASIC METABOLIC PANEL - Abnormal    Sodium 135      Potassium 3.6      Chloride 104      Carbon Dioxide (CO2) 26      Anion Gap 5      Urea Nitrogen 9      Creatinine 1.04      Calcium 9.1      Glucose 133 (*)     GFR Estimate 73     CBC WITH PLATELETS AND DIFFERENTIAL - Abnormal    WBC Count 13.5 (*)     RBC Count 5.18      Hemoglobin 13.9      Hematocrit 41.9      MCV 81      MCH 26.8      MCHC 33.2      RDW 15.7 (*)     Platelet Count 321      % Neutrophils 73      % Lymphocytes 18      % Monocytes 6      % Eosinophils 2      % Basophils 0      % Immature Granulocytes 1      NRBCs per 100 WBC 0      Absolute Neutrophils 9.8 (*)     Absolute Lymphocytes 2.4      Absolute Monocytes 0.8      Absolute Eosinophils 0.3      Absolute Basophils 0.1      Absolute Immature Granulocytes 0.1      Absolute NRBCs 0.0     LIPASE - Normal    Lipase 144     URINE CULTURE      Emergency Department Course:    Reviewed:  I reviewed  nursing notes, vitals, past medical history and Care Everywhere    Assessments:  0907 I obtained history and examined the patient as noted above.     1019 I rechecked the patient and explained findings.     Interventions:  0917 NS 1L IV  0920 Toradol 15 mg IV  0920 Zofran 4 mg IV    Disposition:  The patient was discharged to home.     Impression & Plan     Medical Decision Making:  Ayana Prasad is a 31 year old adult who presented with unilateral flank consistent with renal colic. CT confirms a ureteral stone.  Pain is controlled with interventions in the Emergency Department. There is no fever or evidence of a urinary tract infection.  The patient will be discharged with opioid analgesics and Ibuprofen for pain. Flomax will be prescribed daily to attempt to ease stone passage.  Other etiologies for these symptoms (AAA, pyelonephritis, amongst others) are considered and have been excluded.  Return if increasing pain not controlled with pain meds, vomiting, and fever.  Strain urine to look for stone, if detected, submit to primary doctor for lab analysis.  Follow up with urology within one week, sooner if pain continues, as retrieval of the stone may be required for refractory symptoms.     Diagnosis:    ICD-10-CM    1. Left ureteral calculus  N20.1      Discharge Medications:  New Prescriptions    ONDANSETRON (ZOFRAN ODT) 4 MG ODT TAB    Take 1 tablet (4 mg) by mouth every 6 hours as needed for nausea or vomiting    OXYCODONE-ACETAMINOPHEN (PERCOCET) 5-325 MG TABLET    Take 1-2 tablets by mouth every 4 hours as needed for pain No driving a car or drinking alcohol for 6 hours after taking this medication.    TAMSULOSIN (FLOMAX) 0.4 MG CAPSULE    Take 1 capsule (0.4 mg) by mouth daily for 5 doses     Scribe Disclosure:  WAYNE, Ismael Jones, am serving as a scribe at 9:06 AM on 1/16/2022 to document services personally performed by Haleigh Collins MD based on my observations and the provider's statements to me.            Haleigh Collins MD  01/16/22 1545

## 2022-01-16 NOTE — DISCHARGE INSTRUCTIONS
Take Ibuprofen 600 mg every 6 hours as needed for pain    Drink plenty of fluids.    Opioid Medication Information    You have been given a prescription for an opioid (narcotic) pain medicine and/or have received a pain medicine while here in the Emergency Department. These medicines can make you drowsy or impaired. You must not drive, operate dangerous equipment, or engage in any other dangerous activities while taking these medications. If you drive while taking these medications, you could be arrested for DUI, or driving under the influence. Do not drink any alcohol while you are taking these medications.   Opioid pain medications can cause addiction. If you have a history of chemical dependency of any type, you are at a higher risk of becoming addicted to pain medications.  Only take these prescribed medications to treat your pain when all other options have been tried. Take it for as short a time and as few doses as possible. Store your pain pills in a secure place, as they are frequently stolen and provide a dangerous opportunity for children or visitors in your house to start abusing these powerful medications. We will not replace any lost or stolen medicine.  As soon as your pain is better, you should flush all your remaining medication.   Many prescription pain medications contain Tylenol  (acetaminophen), including Vicodin , Tylenol #3 , Norco , Lortab , and Percocet .  You should not take any extra pills of Tylenol  if you are using these prescription medications or you can get very sick.  Do not ever take more than 4000 mg of acetaminophen in any 24 hour period.  All opioids tend to cause constipation. Drink plenty of water and eat foods that have a lot of fiber, such as fruits, vegetables, prune juice, apple juice and high fiber cereal.  Take a laxative if you don t move your bowels at least every other day. Miralax , Milk of Magnesia, Colace , or Senna  can be used to keep you regular.

## 2022-01-17 ENCOUNTER — TELEPHONE (OUTPATIENT)
Dept: FAMILY MEDICINE | Facility: CLINIC | Age: 32
End: 2022-01-17
Payer: COMMERCIAL

## 2022-01-17 DIAGNOSIS — N20.1 LEFT URETERAL CALCULUS: Primary | ICD-10-CM

## 2022-01-17 LAB — BACTERIA UR CULT: NORMAL

## 2022-01-17 NOTE — TELEPHONE ENCOUNTER
Patient seen in ER 1/16/2022 and was prescribed and given hard copy of scripts for  oxyCODONE-acetaminophen(PERCOCET) 5-325 MG tablet, tamsulosin (FLOMAX) 0.4 MG capsule, ondansetron (ZOFRAN ODT) 4 MG ODT tab. Patient states theses need to be prescribed by PCP.

## 2022-01-18 RX ORDER — OXYCODONE AND ACETAMINOPHEN 5; 325 MG/1; MG/1
1 TABLET ORAL EVERY 4 HOURS PRN
Qty: 12 TABLET | Refills: 0 | Status: SHIPPED | OUTPATIENT
Start: 2022-01-18 | End: 2022-04-15

## 2022-01-18 RX ORDER — TAMSULOSIN HYDROCHLORIDE 0.4 MG/1
0.4 CAPSULE ORAL DAILY
Qty: 5 CAPSULE | Refills: 0 | Status: SHIPPED | OUTPATIENT
Start: 2022-01-18 | End: 2022-04-15

## 2022-01-18 RX ORDER — ONDANSETRON 4 MG/1
4 TABLET, ORALLY DISINTEGRATING ORAL EVERY 6 HOURS PRN
Qty: 15 TABLET | Refills: 0 | Status: SHIPPED | OUTPATIENT
Start: 2022-01-18 | End: 2022-05-26

## 2022-01-18 NOTE — TELEPHONE ENCOUNTER
Patient notified of below and voiced understanding and agreement.  Jeanna Sidhu RN  [facility]

## 2022-01-18 NOTE — TELEPHONE ENCOUNTER
Called [phone] (home)     Did patient answer the phone: No, left a message on voicemail to return call to the [facility]3] at [phone].    Tran RN,BSN  Triage Nurse  [facility]: [facility]3]

## 2022-01-18 NOTE — TELEPHONE ENCOUNTER
I have sent medications.  Please let patient know that this is a one-time only prescription for Percocet.  The prescription will not be refilled.    Becki CARVALHOC

## 2022-01-20 DIAGNOSIS — Z53.9 DIAGNOSIS NOT YET DEFINED: Primary | ICD-10-CM

## 2022-01-20 PROCEDURE — G0179 MD RECERTIFICATION HHA PT: HCPCS | Performed by: NURSE PRACTITIONER

## 2022-01-21 ENCOUNTER — VIRTUAL VISIT (OUTPATIENT)
Dept: PSYCHIATRY | Facility: CLINIC | Age: 32
End: 2022-01-21
Attending: NURSE PRACTITIONER
Payer: COMMERCIAL

## 2022-01-21 ENCOUNTER — TELEPHONE (OUTPATIENT)
Dept: PSYCHIATRY | Facility: CLINIC | Age: 32
End: 2022-01-21

## 2022-01-21 ENCOUNTER — TELEPHONE (OUTPATIENT)
Dept: FAMILY MEDICINE | Facility: CLINIC | Age: 32
End: 2022-01-21

## 2022-01-21 DIAGNOSIS — F25.0 SCHIZOAFFECTIVE DISORDER, BIPOLAR TYPE (H): Primary | ICD-10-CM

## 2022-01-21 DIAGNOSIS — F90.2 ATTENTION DEFICIT HYPERACTIVITY DISORDER (ADHD), COMBINED TYPE: ICD-10-CM

## 2022-01-21 DIAGNOSIS — F41.9 ANXIETY: ICD-10-CM

## 2022-01-21 DIAGNOSIS — F31.75 BIPOLAR DISORDER, IN PARTIAL REMISSION, MOST RECENT EPISODE DEPRESSED (H): ICD-10-CM

## 2022-01-21 DIAGNOSIS — G25.71 AKATHISIA: ICD-10-CM

## 2022-01-21 PROCEDURE — 99215 OFFICE O/P EST HI 40 MIN: CPT | Mod: 95 | Performed by: NURSE PRACTITIONER

## 2022-01-21 RX ORDER — DIPHENHYDRAMINE HCL 50 MG
50 CAPSULE ORAL AT BEDTIME
Qty: 30 CAPSULE | Refills: 1 | Status: SHIPPED | OUTPATIENT
Start: 2022-01-21 | End: 2022-03-02

## 2022-01-21 RX ORDER — ZIPRASIDONE HYDROCHLORIDE 40 MG/1
80 CAPSULE ORAL EVERY MORNING
Qty: 60 CAPSULE | Refills: 1 | Status: SHIPPED | OUTPATIENT
Start: 2022-01-21 | End: 2022-03-02

## 2022-01-21 RX ORDER — LITHIUM CARBONATE 300 MG/1
TABLET, FILM COATED, EXTENDED RELEASE ORAL
Qty: 120 TABLET | Refills: 1 | Status: SHIPPED | OUTPATIENT
Start: 2022-01-21 | End: 2022-03-02

## 2022-01-21 RX ORDER — LISDEXAMFETAMINE DIMESYLATE 50 MG/1
50 CAPSULE ORAL DAILY
Qty: 30 CAPSULE | Refills: 0 | Status: SHIPPED | OUTPATIENT
Start: 2022-02-04 | End: 2022-03-06

## 2022-01-21 RX ORDER — FLUPHENAZINE DECANOATE 25 MG/ML
INJECTION, SOLUTION INTRAMUSCULAR; SUBCUTANEOUS
Qty: 6 ML | Refills: 4 | Status: SHIPPED | OUTPATIENT
Start: 2022-01-21 | End: 2022-03-22

## 2022-01-21 RX ORDER — ESCITALOPRAM OXALATE 10 MG/1
10 TABLET ORAL DAILY
Qty: 90 TABLET | Refills: 0 | Status: SHIPPED | OUTPATIENT
Start: 2022-01-21 | End: 2022-04-07

## 2022-01-21 RX ORDER — LISDEXAMFETAMINE DIMESYLATE 50 MG/1
50 CAPSULE ORAL DAILY
Qty: 30 CAPSULE | Refills: 0 | Status: SHIPPED | OUTPATIENT
Start: 2022-03-06 | End: 2022-04-05

## 2022-01-21 NOTE — TELEPHONE ENCOUNTER
On January 21, 2022, at 11:33 AM, writer called patient at mobile to confirm Virtual Visit. Writer unable to make contact with patient. Writer left detailed voice message for call back, with [phone] left as callback number. Link for [employer] was provided via: Send to preferred e-mail: [email or web address]. Mercy Gonzalez, EMT    Writer called patient back at 12:14 PM at mobile to confirm virtual visit. Writer was still unable to reach patient. Mercy Gonzalez, EMT

## 2022-01-21 NOTE — PATIENT INSTRUCTIONS
-Discontinue Klonopin and Remeron as you have not been taking the medication.  -Change Geodon to 80 mg in AM.  Evening dose is discontinued.  -Continue all other medication regimen for now.    Your next appointment is scheduled on 3/2/2022 (Wed) at 1:30pm.    Thank you for coming to the [facility].    Lab Testing:  If you had lab testing today and your results are reassuring or normal they will be mailed to you or sent through [employer] within 7 days. If the lab tests need quick action we will call you with the results. The phone number we will call with results is # [phone] (home) . If this is not the best number please call our clinic and change the number.    Medication Refills:  If you need any refills please call your pharmacy and they will contact us. Our fax number for refills is [phone]. Please allow three business for refill processing. If you need to  your refill at a new pharmacy, please contact the new pharmacy directly. The new pharmacy will help you get your medications transferred.     Scheduling:  If you have any concerns about today's visit or wish to schedule another appointment please call our office during normal business hours [phone] (8-5:00 M-F)    Contact Us:  Please call [phone] during business hours (8-5:00 M-F).  If after clinic hours, or on the weekend, please call  [phone].    Financial Assistance [phone]  [facility] Billing [phone]  Central Billing Office, [facility]: [phone]  [facility] Billing [phone]  Medical Records [phone]      MENTAL HEALTH CRISIS NUMBERS:  For a medical emergency please call  911 or go to the nearest ER.     [facility]:   [facility] -[phone]   Crisis Residence [facility] Residence -[phone]   Walk-In Counseling Center [facility] -[phone]   COPE 24/7 [facility] Mobile Team -[phone] (adults)/[phone] (child)  CHILD: [facility] needs assessment team - [phone]      [facility]:   [facility] - [phone]   Walk-in counseling [facility] - [phone]   Walk-in counseling [facility] - [phone]   Crisis Residence [facility] Residence - [phone]  Urgent Care Adult Mental [facility]-[phone] mobile unit/ 24/7 crisis line    National Crisis Numbers:   National Suicide Prevention Lifeline: 1-569-011-TALK (655-558-9934)  Poison Control Center - 5-172-188-4747  [employer]/resources for a list of additional resources (SOS)  Trans Lifeline a hotline for transgender people 0-031-847-3412  The [employer] Project a hotline for LGBT youth 1-357-251-6819  Crisis Text Line: For any crisis 24/7   To: 566297  see www.crisistextline.org  - IF MAKING A CALL FEELS TOO HARD, send a text!         Again thank you for choosing [facility] M[facility]AL HEALTH [facility] and please let us know how we can best partner with you to improve you and your family's health.    You may be receiving a survey regarding this appointment. We would love to have your feedback, both positive and negative. The survey is done by an external company, so your answers are anonymous.

## 2022-01-24 ENCOUNTER — MEDICAL CORRESPONDENCE (OUTPATIENT)
Dept: HEALTH INFORMATION MANAGEMENT | Facility: CLINIC | Age: 32
End: 2022-01-24
Payer: COMMERCIAL

## 2022-01-24 DIAGNOSIS — F31.11 BIPOLAR 1 DISORDER, MANIC, MILD (H): ICD-10-CM

## 2022-01-24 DIAGNOSIS — M54.9 INTRACTABLE BACK PAIN: ICD-10-CM

## 2022-01-24 DIAGNOSIS — F41.9 ANXIETY: ICD-10-CM

## 2022-01-25 RX ORDER — GABAPENTIN 300 MG/1
CAPSULE ORAL
Qty: 300 CAPSULE | Refills: 3 | Status: SHIPPED | OUTPATIENT
Start: 2022-01-25 | End: 2022-04-15

## 2022-01-25 NOTE — ANESTHESIA CARE TRANSFER NOTE
Body Location Override (Optional - Billing Will Still Be Based On Selected Body Map Location If Applicable): left distal pretibial region Patient: Ayana Prasad    Procedure(s):  Cystoscopy With Left Stent Placement - Wound Class: II-Clean Contaminated    Diagnosis: left ureteral stone  Diagnosis Additional Information: No value filed.    Anesthesia Type:   General, LMA     Note:  Airway :Face Mask  Patient transferred to:Phase II  Handoff Report: Identifed the Patient, Identified the Reponsible Provider, Reviewed the pertinent medical history, Discussed the surgical course, Reviewed Intra-OP anesthesia mangement and issues during anesthesia, Set expectations for post-procedure period and Allowed opportunity for questions and acknowledgement of understanding      Vitals: (Last set prior to Anesthesia Care Transfer)    CRNA VITALS  8/30/2018 1143 - 8/30/2018 1214      8/30/2018             Pulse: 78    SpO2: 94 %                Electronically Signed By: BROOKLYN Aguilar CRNA  August 30, 2018  12:14 PM

## 2022-01-27 ENCOUNTER — DOCUMENTATION ONLY (OUTPATIENT)
Dept: BEHAVIORAL HEALTH | Facility: CLINIC | Age: 32
End: 2022-01-27
Payer: COMMERCIAL

## 2022-01-27 NOTE — PROGRESS NOTES
Discharge Summary  Multiple Sessions    Client Name: Ayana Prasad MRN#: [id number] YOB: 1990      Intake 3/31/2022  /  Discharge Date, 1/27/2022      DSM5 Diagnoses: (Sustained by DSM5 Criteria Listed Above)  Diagnoses: 295.70  (F25.1) Schizoaffective Disorder Depressive Type  Psychosocial & Contextual Factors: Prakash Chand) is residing in adult foster care.   He has a history of unstable living situations, drug and alcohol use and family/friend conflicts.  Prakash is is moving through gender transition.  Prakash's parents live in [place] and rarely visits Prakash.      WHODAS 2.0 (12 item) Score: 22          Presenting Concern:  Referred for depression.  On a commitment with [facility]. History of suicidal ideation, drug abuse.      Reason for Discharge:  multible cacellations and multible failed appointments      Disposition at Time of Last Encounter:   Prakash is continuing in the Gender Transition clinic with [facility]. He has had at least 2 overdoses of her psychotropic medication.  He denied suicidal intent.  Safety plan was completed with Prakash participating. Prakash was engaged with a transition psychologist with those sessions ending in the Fall. Prakash was planning on moving to a independent support apartment.  That's on hold with Prakash's relapse with drug use. He will remain on commitment with [facility]  .     Risk Management:   Client has had a history of suicidal ideation: On a committment with [facility].  Safety plan competed Prakash agreed to foollow her safety plan. and suicide attempts: Prakash denied intent however he was admitted inpatient care on several occations.  Safety plan completed.  A safety and risk management plan has been developed including: Prakash has a safety plan he agreed to follow.  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  He is seen in the [facility] transition clinic. He rsides in  adult foster care.      Referred To:  He will continue to work with [facility] and her mental [facility] .  He will continue as needed with the Gender Transition clinic at [facility].        Joana Hagen, JULISA   1/27/2022

## 2022-01-28 ENCOUNTER — MEDICAL CORRESPONDENCE (OUTPATIENT)
Dept: HEALTH INFORMATION MANAGEMENT | Facility: CLINIC | Age: 32
End: 2022-01-28
Payer: COMMERCIAL

## 2022-01-28 ENCOUNTER — TELEPHONE (OUTPATIENT)
Dept: PSYCHIATRY | Facility: CLINIC | Age: 32
End: 2022-01-28
Payer: COMMERCIAL

## 2022-01-28 NOTE — TELEPHONE ENCOUNTER
Returned the call to CM.  CM noted that pt agreed to 9 months commitment and Ashley extension yesterday.  However, he noted to her yesterday that he will start not taking the medications on 3/3/2022 as that is the next appt.  CM wondering if pt can be seen sooner.  Discussed that's the soonest appt available at this time and if pt started not to take medications and deteriorates, to bring him to ER.  CM will encourage pt to continue taking medications .Regine Last, NELLY, 1/28/2022        ----- Message from Karen Carlton sent at 1/28/2022 12:31 PM CST -----  Regarding: message from pt.   Contact: [phone]  Vero the  from [facility] m on the clinic line. On 01/28/22 at 10:33 a.m.  Pt. Stated that he will no  longer take  his psy meds. Vero is requesting a call back to discuss pt. Concerns. [phone]  this is Vero's number. VAL on file.    Thanks   Karen

## 2022-02-04 ENCOUNTER — TELEPHONE (OUTPATIENT)
Dept: FAMILY MEDICINE | Facility: CLINIC | Age: 32
End: 2022-02-04
Payer: COMMERCIAL

## 2022-02-07 ENCOUNTER — MEDICAL CORRESPONDENCE (OUTPATIENT)
Dept: HEALTH INFORMATION MANAGEMENT | Facility: CLINIC | Age: 32
End: 2022-02-07
Payer: COMMERCIAL

## 2022-03-02 ENCOUNTER — TELEPHONE (OUTPATIENT)
Dept: PSYCHIATRY | Facility: CLINIC | Age: 32
End: 2022-03-02

## 2022-03-02 ENCOUNTER — VIRTUAL VISIT (OUTPATIENT)
Dept: PSYCHIATRY | Facility: CLINIC | Age: 32
End: 2022-03-02
Attending: NURSE PRACTITIONER
Payer: COMMERCIAL

## 2022-03-02 DIAGNOSIS — F41.9 ANXIETY: ICD-10-CM

## 2022-03-02 DIAGNOSIS — F31.75 BIPOLAR DISORDER, IN PARTIAL REMISSION, MOST RECENT EPISODE DEPRESSED (H): ICD-10-CM

## 2022-03-02 DIAGNOSIS — F25.0 SCHIZOAFFECTIVE DISORDER, BIPOLAR TYPE (H): ICD-10-CM

## 2022-03-02 DIAGNOSIS — F99 INSOMNIA DUE TO OTHER MENTAL DISORDER: Primary | ICD-10-CM

## 2022-03-02 DIAGNOSIS — G25.71 AKATHISIA: ICD-10-CM

## 2022-03-02 DIAGNOSIS — F51.05 INSOMNIA DUE TO OTHER MENTAL DISORDER: Primary | ICD-10-CM

## 2022-03-02 PROCEDURE — 99215 OFFICE O/P EST HI 40 MIN: CPT | Mod: GT | Performed by: NURSE PRACTITIONER

## 2022-03-02 PROCEDURE — 99417 PROLNG OP E/M EACH 15 MIN: CPT | Mod: GT | Performed by: NURSE PRACTITIONER

## 2022-03-02 RX ORDER — DIPHENHYDRAMINE HCL 50 MG
50 CAPSULE ORAL AT BEDTIME
Qty: 30 CAPSULE | Refills: 1 | Status: SHIPPED | OUTPATIENT
Start: 2022-03-02 | End: 2022-04-15

## 2022-03-02 RX ORDER — LITHIUM CARBONATE 300 MG/1
TABLET, FILM COATED, EXTENDED RELEASE ORAL
Qty: 120 TABLET | Refills: 1 | Status: SHIPPED | OUTPATIENT
Start: 2022-03-02 | End: 2022-03-22

## 2022-03-02 RX ORDER — ZIPRASIDONE HYDROCHLORIDE 40 MG/1
80 CAPSULE ORAL EVERY MORNING
Qty: 60 CAPSULE | Refills: 1 | Status: SHIPPED | OUTPATIENT
Start: 2022-03-02 | End: 2022-03-22
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
## 2022-03-02 NOTE — PATIENT INSTRUCTIONS
-Take Belsomra 5 mg at bedtime as needed for sleep  -Continue all other medications for now.    Your next appointment is scheduled on 4/7/2022 (Thu) at 1:30pm.      **For crisis resources, please see the information at the end of this document**   Patient Education    Thank you for coming to the [facility].    Lab Testing:  If you had lab testing today and your results are reassuring or normal they will be mailed to you or sent through [employer] within 7 days. If the lab tests need quick action we will call you with the results. The phone number we will call with results is # [phone]. If this is not the best number please call our clinic and change the number.     Medication Refills:  If you need any refills please call your pharmacy and they will contact us. Our fax number for refills is [phone]. Please allow three business days for refill processing.   If you need to change to a different pharmacy, please contact the new pharmacy directly. The new pharmacy will help you get your medications transferred.     Contact Us:  Please call [phone] during business hours (8-5:00 M-F).  If you have medication related questions after clinic hours, or on the weekend, please call [phone].    Financial Assistance [phone]  Medical Records [phone]       MENTAL HEALTH CRISIS RESOURCES:  For a emergency help, please call 911 or go to the nearest Emergency Department.     Emergency Walk-In Options:   EmPATH Unit @ [facility] ([place]): [phone] - Specialized mental health emergency area designed to be calming  [facility] ([place]): [phone]  [facility] ([place]): [phone]  [facility]): [phone]    [facility] Crisis Information:   [place]: [phone]  [place]: [phone]  [place] ([place]) - Adult: [phone]     Child: [phone]  [place] - Adult: [phone]      [facility]: [phone]  [place]: [phone]  List of all [facility] resources:   https://[place].gov/dhs/people-we-serve/adults/health-care/mental-health/resources/crisis-contacts.jsp    National Crisis Information:   Crisis Text Line: Text  [place]  to 871292  National Suicide Prevention Lifeline: 0-666-334-TALK (1-923.389.1003)       For online chat options, visit https://suicidepreventionlifeline.org/chat/  Poison Control Center: 3-630-428-9295  Trans Lifeline: 6-004-872-7525 - Hotline for transgender people of all ages  The [employer] Project: 5-227-117-5186 - Hotline for LGBT youth     For Non-Emergency Support:   Fast Tracker: Mental Health & Substance Use Disorder Resources -   https://www.[employer] C Systemsckermn.org/

## 2022-03-02 NOTE — TELEPHONE ENCOUNTER
Called and LVM to Senia BARTHOLOMEW.  Updated that pt reported he is not taking any medications x 1 month, but feeling fairly stable in his mood.  Anxiety could be better, but manageable, denies any psychotic symptoms.  Pt also noted no substance use.  Pt noted he is not sleeping at all and does not feel lack of need for sleep and wanted to retry Seroquel.  Discussed Seroquel trial caused QTC prolongation and would recommend EKG prior to possible restart.  Prescribed Belsomra 1 mg HS PRN for sleep today.  Requested call back as pt is Jarvised. Regine Last, CNP, 3/2/2022
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
## 2022-03-03 ASSESSMENT — PATIENT HEALTH QUESTIONNAIRE - PHQ9: SUM OF ALL RESPONSES TO PHQ QUESTIONS 1-9: 0

## 2022-03-09 ENCOUNTER — TELEPHONE (OUTPATIENT)
Dept: FAMILY MEDICINE | Facility: CLINIC | Age: 32
End: 2022-03-09

## 2022-03-09 NOTE — TELEPHONE ENCOUNTER
The patient missed their video visit with  at 3pm today. Attempted to send multiple emails for the video link and waited for 25 minutes with no answer. Unable to call the patient due to having no phone # in their chart. Please update that if they call back!! Will need to reschedule.

## 2022-03-10 ENCOUNTER — TELEPHONE (OUTPATIENT)
Dept: FAMILY MEDICINE | Facility: CLINIC | Age: 32
End: 2022-03-10
Payer: COMMERCIAL

## 2022-03-10 NOTE — TELEPHONE ENCOUNTER
Forms received from: [facility]  Phone number listed: [phone]   Fax listed: [phone]  Date received: 3/9/22  Form description: SN  Once forms are completed, please return to [facility] [place] [facility] visa fax [phone].  Is patient requesting to be contacted when forms are completed: na  Phone: na  Form placed: a to Yusefs mirian Sorto

## 2022-03-11 ENCOUNTER — MEDICAL CORRESPONDENCE (OUTPATIENT)
Dept: HEALTH INFORMATION MANAGEMENT | Facility: CLINIC | Age: 32
End: 2022-03-11
Payer: COMMERCIAL

## 2022-03-11 ENCOUNTER — TELEPHONE (OUTPATIENT)
Dept: FAMILY MEDICINE | Facility: CLINIC | Age: 32
End: 2022-03-11
Payer: COMMERCIAL

## 2022-03-11 NOTE — LETTER
March 11, 2022      Ayana Prasad  [address]  [place] [address]      Your healthcare team cares about your health. To provide you with the best care,   we have reviewed your chart and based on our findings, we see that you are due to:     - CERVICAL CANCER SCREENING: Schedule a Cervical Cancer Screening, with Pap and wellness exam.     If you have already completed these items, please contact the clinic via phone or   Mychart so your care team can review and update your records. Thank you for   choosing [facility] for your healthcare needs. For any questions,   concerns, or to schedule an appointment please contact the clinic.       Healthy Regards,      Your [facility] Care Team

## 2022-03-11 NOTE — TELEPHONE ENCOUNTER
Patient Quality Outreach    Patient is due for the following:   Cervical Cancer Screening - PAP Needed  Physical  - due    NEXT STEPS:   Schedule a yearly physical    Type of outreach:    Sent letter.      Questions for provider review:    None     Cassia Silverman

## 2022-03-17 ENCOUNTER — TELEPHONE (OUTPATIENT)
Dept: FAMILY MEDICINE | Facility: CLINIC | Age: 32
End: 2022-03-17

## 2022-03-17 DIAGNOSIS — Z79.899 ENCOUNTER FOR LONG-TERM (CURRENT) USE OF MEDICATIONS: ICD-10-CM

## 2022-03-17 NOTE — TELEPHONE ENCOUNTER
Forms received from: [facility]   Phone number listed: [phone]   Fax listed: [phone]  Date received: 3/16/22  Form description: SN unable contact patient  Once forms are completed, please return to [facility]   via fax [phone].  Is patient requesting to be contacted when forms are completed: na  Phone: na  Form placed:  To Yusefs basket  Marge Sorto

## 2022-03-19 LAB
ATRIAL RATE - MUSE: 72 BPM
DIASTOLIC BLOOD PRESSURE - MUSE: NORMAL MMHG
INTERPRETATION ECG - MUSE: NORMAL
P AXIS - MUSE: 34 DEGREES
PR INTERVAL - MUSE: 160 MS
QRS DURATION - MUSE: 96 MS
QT - MUSE: 402 MS
QTC - MUSE: 440 MS
R AXIS - MUSE: 24 DEGREES
SYSTOLIC BLOOD PRESSURE - MUSE: NORMAL MMHG
T AXIS - MUSE: 28 DEGREES
VENTRICULAR RATE- MUSE: 72 BPM

## 2022-03-21 ENCOUNTER — TELEPHONE (OUTPATIENT)
Dept: PALLIATIVE MEDICINE | Facility: CLINIC | Age: 32
End: 2022-03-21

## 2022-03-21 ENCOUNTER — VIRTUAL VISIT (OUTPATIENT)
Dept: FAMILY MEDICINE | Facility: CLINIC | Age: 32
End: 2022-03-21
Payer: COMMERCIAL

## 2022-03-21 DIAGNOSIS — F41.9 ANXIETY: Primary | ICD-10-CM

## 2022-03-21 DIAGNOSIS — F12.20 CANNABIS DEPENDENCE (H): ICD-10-CM

## 2022-03-21 DIAGNOSIS — G83.4 CAUDA EQUINA SYNDROME WITH NEUROGENIC BLADDER (H): ICD-10-CM

## 2022-03-21 DIAGNOSIS — F25.0 SCHIZOAFFECTIVE DISORDER, BIPOLAR TYPE (H): ICD-10-CM

## 2022-03-21 DIAGNOSIS — I10 HYPERTENSION, UNSPECIFIED TYPE: ICD-10-CM

## 2022-03-21 DIAGNOSIS — M54.9 INTRACTABLE BACK PAIN: ICD-10-CM

## 2022-03-21 DIAGNOSIS — Z78.9 FEMALE-TO-MALE TRANSGENDER PERSON: ICD-10-CM

## 2022-03-21 PROCEDURE — 99214 OFFICE O/P EST MOD 30 MIN: CPT | Mod: 95 | Performed by: NURSE PRACTITIONER

## 2022-03-21 RX ORDER — HYDROXYZINE HYDROCHLORIDE 25 MG/1
TABLET, FILM COATED ORAL
Qty: 60 TABLET | Refills: 1 | Status: SHIPPED | OUTPATIENT
Start: 2022-03-21 | End: 2022-04-15

## 2022-03-21 ASSESSMENT — ANXIETY QUESTIONNAIRES
4. TROUBLE RELAXING: NEARLY EVERY DAY
2. NOT BEING ABLE TO STOP OR CONTROL WORRYING: NEARLY EVERY DAY
5. BEING SO RESTLESS THAT IT IS HARD TO SIT STILL: NEARLY EVERY DAY
3. WORRYING TOO MUCH ABOUT DIFFERENT THINGS: NEARLY EVERY DAY
1. FEELING NERVOUS, ANXIOUS, OR ON EDGE: NEARLY EVERY DAY
GAD7 TOTAL SCORE: 15
6. BECOMING EASILY ANNOYED OR IRRITABLE: NOT AT ALL
7. FEELING AFRAID AS IF SOMETHING AWFUL MIGHT HAPPEN: NOT AT ALL
GAD7 TOTAL SCORE: 15
GAD7 TOTAL SCORE: 15
7. FEELING AFRAID AS IF SOMETHING AWFUL MIGHT HAPPEN: NOT AT ALL

## 2022-03-21 ASSESSMENT — ENCOUNTER SYMPTOMS
HALLUCINATIONS: 1
NERVOUS/ANXIOUS: 1
SLEEP DISTURBANCE: 1

## 2022-03-21 ASSESSMENT — PATIENT HEALTH QUESTIONNAIRE - PHQ9
SUM OF ALL RESPONSES TO PHQ QUESTIONS 1-9: 3
10. IF YOU CHECKED OFF ANY PROBLEMS, HOW DIFFICULT HAVE THESE PROBLEMS MADE IT FOR YOU TO DO YOUR WORK, TAKE CARE OF THINGS AT HOME, OR GET ALONG WITH OTHER PEOPLE: NOT DIFFICULT AT ALL
SUM OF ALL RESPONSES TO PHQ QUESTIONS 1-9: 3

## 2022-03-21 NOTE — TELEPHONE ENCOUNTER
A pain consult was placed recently for the patient and Ayana Gregory advised the PCP at the time to place an addiction medicine consult and have the patient seen by addiction before coming for a pain consult.  I need to do some more research on this so will leave this encounter open while investigating this.     Kelli Arreaga MD

## 2022-03-21 NOTE — PROGRESS NOTES
"Prakash is a 31 year old who is being evaluated via a billable video visit.      How would you like to obtain your AVS? MyChart  If the video visit is dropped, the invitation should be resent by: Text to cell phone: [phone]  Will anyone else be joining your video visit? No      Video Start Time: 11:25 AM    Assessment & Plan     Anxiety  Educated on use of hydroxyzine.  Encouraged to follow-up with mental health as scheduled in April  - hydrOXYzine (ATARAX) 25 MG tablet; Take 1 to 2 tablets every 6 hours as needed for anxiety/sleep    Cauda equina syndrome with neurogenic bladder (H)  - Pain Management Referral; Future    Intractable back pain  - Pain Management Referral; Future    Cannabis dependence (H)    Schizoaffective disorder, bipolar type (H)  Follow-up with mental health as scheduled in April    Female-to-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 person  Encouraged to follow-up with gender care provider-declines  Offered referral to alternative gender care provider-declines.    Hypertension, unspecified type  We will set up for [facility] only appointment for blood pressure check.  Anticipate need to restart blood pressure medications.               BMI:   Estimated body mass index is 37.12 kg/m  as calculated from the following:    Height as of 1/16/22: 1.676 m (5' 6\").    Weight as of 1/16/22: 104.3 kg (230 lb).           No follow-ups on file.    [facility] SHAILESH Randall is a 31 year old who presents for the following health issues     History of Present Illness       Mental Health Follow-up:  Patient presents to follow-up on Anxiety.    Patient's anxiety since last visit has been:  Worse  The patient is having other symptoms associated with anxiety.  Any significant life events: grief or loss  Patient is feeling anxious or having panic attacks.  Patient has no concerns about alcohol or drug use.       Today's PHQ-9         PHQ-9 Total Score: 3  PHQ-9 Q9 Thoughts of better " off dead/self-harm past 2 weeks :   (P) Not at all    How difficult have these problems made it for you to do your work, take care of things at home, or get along with other people: Not difficult at all    Today's AVA-7 Score: 15    He eats 0-1 servings of fruits and vegetables daily.He consumes 0 sweetened beverage(s) daily.He exercises with enough effort to increase his heart rate 30 to 60 minutes per day.  He exercises with enough effort to increase his heart rate 6 days per week.   He is taking medications regularly.       Social History     Tobacco Use     Smoking status: Former Smoker     Packs/day: 1.00     Years: 5.00     Pack years: 5.00     Types: Dip, chew, snus or snuff, Other     Start date: 8/1/2011     Smokeless tobacco: Former User     Types: Chew     Quit date: 12/17/2019     Tobacco comment: vape   Vaping Use     Vaping Use: Every day     Substances: Nicotine, Flavoring     Devices: Refillable tank   Substance Use Topics     Alcohol use: No     Alcohol/week: 0.0 standard drinks     Drug use: Not Currently     Types: Marijuana, Opiates     Comment: oxy, heroin (smoke)     PHQ 3/30/2020 2/16/2021 3/1/2021   PHQ-9 Total Score 2 0 0   Q9: Thoughts of better off dead/self-harm past 2 weeks Not at all Not at all Not at all   Some encounter information is confidential and restricted. Go to Review Flowsheets activity to see all data.     AVA-7 SCORE 3/30/2020 2/16/2021 3/1/2021   Total Score - - -   Total Score 15 2 0   Some encounter information is confidential and restricted. Go to Review Flowsheets activity to see all data.         Suicide Assessment Five-step Evaluation and Treatment (SAFE-T)          Video visit completed due to COVID 19 outbreak.     Has been feeling more anxious and is having panic attacks. This has been going on since Jan. Had been dealing with them by smoking week. Needing to smoke almost continusly to help control anxiety. Last appointment  With mental health was about 1 mo ago.  [facility] When has a panic attack Will feel really hot, short of breath, will feel like is freaking out. Voices came back and is are getting louder. Sleep is not good. Thinks is getting 30-60 minutes of sleep per day. Has not been taking blood pressure meds since Jan.  Testosterone was stopped after hospitalization, reports gender care no longer prescribing.  Stopped all mental health medications on only.  Using marijuana daily, denies other illicit drug use.    Review of Systems   Psychiatric/Behavioral: Positive for hallucinations (Auditory) and sleep disturbance. Negative for suicidal ideas. The patient is nervous/anxious.           Objective           Vitals:  No vitals were obtained today due to virtual visit.    Physical Exam  Constitutional:       General: He is not in acute distress.     Appearance: Normal appearance. He is not toxic-appearing.   HENT:      Nose: No congestion.   Eyes:      General: Lids are normal.   Pulmonary:      Effort: Pulmonary effort is normal. No tachypnea, bradypnea or respiratory distress.   Skin:     Coloration: Skin is not ashen, cyanotic, jaundiced or pale.   Neurological:      Mental Status: He is alert.   Psychiatric:         Mood and Affect: Mood normal.         Speech: Speech normal.         Behavior: Behavior normal. Behavior is cooperative.                Video-Visit Details    Type of service:  Video Visit    Video End Time:11:42 AM    Originating Location (pt. Location): Home    Distant Location (provider location):  [facility] SHAILESH     Platform used for Video Visit: Elías

## 2022-03-21 NOTE — TELEPHONE ENCOUNTER
Order received for a New Evaluation     Are there any red flags that may impact the assessment or management of the patient?   Active or recent alcohol, drug or prescription medication misuse - use comments  Mental Illness / Communication Difficulties - use comments        Routing to review.    Vero MALDONADO    [facility]

## 2022-03-21 NOTE — LETTER
March 23, 2022    Ayana Prasad  [address]  [place] [address]    Dear Nabil Piña to the [facility].  We are located at [address] [place] [address] [address] [place] [address]. Your appointment has been scheduled on 4/27/2022 at 11:00a with Richie Da Silva MD .    At your first visit, you will meet your team of caregivers who will help you to develop pain management strategies that will last a lifetime. You will meet with our support staff to review your insurance information, and collect your co-payment if required by your insurance company. You will also meet with a medical pain specialist and care coordinator who will assess your pain and develop a plan of care for your successful pain rehabilitation. You should expect to spend approximately 1 hour at your first visit with us. Usually, patients work with us for a period of 6-12 months, and eventually return to their primary doctor once their pain management has stabilized.      To help us make your visit go as smoothly as possible, please bring the following items with you on your visit:       Completed Pain Questionnaire enclosed in this packet.  If you do not bring the completed questionnaire, we may have to reschedule your appointment.    List of any medicines that you are currently taking or have been prescribed    Important NON-[place] medical information such as medical records or tests results (X-rays, or laboratory tests)    Your health insurance card    Financial resources to cover your co-payment or balance due at the time of service (cash, personal check, Visa, and MasterCard are acceptable methods of payment)     Due to the demand for new patient evaluations, you must notify the scheduling department 48 hours (2 days) in advance if you are not able to keep this appointment. Failure to do so could affect your ability to reschedule with our clinic. Please be aware that we will not prescribe any medications at  your first visit.     Please call [phone] with any questions regarding your appointment. We look forward to meeting you and working to address your health care needs.     Sincerely,    [facility] [place]

## 2022-03-22 DIAGNOSIS — F99 INSOMNIA DUE TO OTHER MENTAL DISORDER: Primary | ICD-10-CM

## 2022-03-22 DIAGNOSIS — F51.05 INSOMNIA DUE TO OTHER MENTAL DISORDER: Primary | ICD-10-CM

## 2022-03-22 RX ORDER — QUETIAPINE FUMARATE 50 MG/1
50 TABLET, FILM COATED ORAL 2 TIMES DAILY
Qty: 30 TABLET | Refills: 1 | Status: SHIPPED | OUTPATIENT
Start: 2022-03-22 | End: 2022-04-07

## 2022-03-22 ASSESSMENT — ANXIETY QUESTIONNAIRES: GAD7 TOTAL SCORE: 15

## 2022-03-22 ASSESSMENT — PATIENT HEALTH QUESTIONNAIRE - PHQ9: SUM OF ALL RESPONSES TO PHQ QUESTIONS 1-9: 3

## 2022-03-22 NOTE — PROGRESS NOTES
Per Mychart note, pt has not been taking the medications.  Request not all medications to be discontinued, but agrees to discontinue Prolixin and Lithium.  Also discussed Geodon will be discontinued since this could elevated abnormal QTC.  Pt has hx of elevated QTC with Seroquel.  Most recent  on 3/17/2022.  OK to start Seroquel 50 mg HS and will repeat EKG before next increase on Seroquel if needed.  Regine Last, CNP, 3/22/2022

## 2022-03-22 NOTE — TELEPHONE ENCOUNTER
"He continues to smoke marijuana \"continuously\" and is off all his mental health medications.  Your note states he is sleeping an hour every night. He is not addressing his addiction or his mental health.  Pain is significantly affected by these 2 things.  It will be difficult to recommend anything for his pain until he is stable from a mental health and addiction perspective.     We can see him, however, the recommendation will not include any new medications.  We could consider pain psychology, possibly PT and consider injection options.        I will forward to the  to schedule for new evaluation.     Kelli Arreaga MD   "

## 2022-03-22 NOTE — TELEPHONE ENCOUNTER
Referral for addiction medicine was placed on January 28, 2021.  Patient did not schedule.  Patient was under the impression that if he remained free from illicit substances other than marijuana he would be seen in the pain clinic.  Please clarify if he needs to follow-up with addiction medicine or if he will be seen.  Has reported no illicit drug use other than marijuana since January.    Becki JENKINS

## 2022-03-22 NOTE — TELEPHONE ENCOUNTER
I will send another note to the primary referring provider that patient needs to address addiction prior to seeing pain and to place an addiction medicine consult.  I do not see that this has been done since original referral was placed 3 months ago. See referral not from 12/2021 by Ayana Gregory.

## 2022-03-23 ENCOUNTER — TELEPHONE (OUTPATIENT)
Dept: FAMILY MEDICINE | Facility: CLINIC | Age: 32
End: 2022-03-23
Payer: COMMERCIAL

## 2022-03-23 NOTE — TELEPHONE ENCOUNTER
Pain Management Center Referral      1. Confirmed address with patient? Yes  2. Confirmed phone number with patient? Yes  3. Confirmed referring provider? Yes  4. Is the PCP the same as the referring provider? Yes  5. Has the patient been to any previous pain clinics? No  (If yes, send VAL with welcome letter)  6. Which insurance are we to bill for this appointment?  [employer]    7. Informed pt of cancellation (48 hour) policy? Yes    REGARDING OPIOID MEDICATIONS: We will always address appropriateness of opioid pain medications, but we generally will not automatically take on a prescribing role. When we do take on prescribing of opioids for chronic pain, it is in collaboration with the referring physician for an intermediate period of time (months), with an expectation that the primary physician or provider will assume the prescribing role if medications are effective at stable doses with demonstrated compliance. Therefore, please do not assume that your prescribing responsibilities end on the day of pain clinic consultation.  8. Informed pt of prescribing policy? Yes    9.Please be aware that once you are established with a pain provider and location, you will need to continue have all future visits with that provider and location. It is best to determine what location is the most convenient for you and schedule with that one.    ** PATIENT INFORMED OF THIS POLICY Yes      9. Referring Provider: Becki Avery     10. Criteria for Triage Eval:   -Missed/Failed 1st appointment? N/A     -Medication Focused? N/A     -Mental Health Concerns? (e.g. Recent psych hospitalization/snap shot)? N/A     -Active substance abuse? N/A     -Patient behaviors (e.g. Offensive language/raised voice)? N/A

## 2022-03-23 NOTE — TELEPHONE ENCOUNTER
Forms received from: [facility]   Phone number listed: [phone]   Fax listed: [phone]  Date received: 3/22/22  Form description: discharge order  Once forms are completed, please return to [facility] via fax [phone].  Is patient requesting to be contacted when forms are completed: na  Phone: na  Form placed:  To Yasmin Sorto

## 2022-03-29 ENCOUNTER — TELEPHONE (OUTPATIENT)
Dept: FAMILY MEDICINE | Facility: CLINIC | Age: 32
End: 2022-03-29

## 2022-03-29 DIAGNOSIS — F25.0 SCHIZOAFFECTIVE DISORDER, BIPOLAR TYPE (H): Primary | ICD-10-CM

## 2022-03-29 DIAGNOSIS — K21.9 GASTRO-ESOPHAGEAL REFLUX DISEASE WITHOUT ESOPHAGITIS: ICD-10-CM

## 2022-03-29 NOTE — TELEPHONE ENCOUNTER
Forms received from: [facility]   Phone number listed: [phone]   Fax listed: [phone]  Date received: 3/28/22  Form description: discharge order/SN  Once forms are completed, please return to [facility]  via fax [phone].  Is patient requesting to be contacted when forms are completed: na  Phone: na  Form placed:  To Yasmin Sorto

## 2022-03-30 ENCOUNTER — TELEPHONE (OUTPATIENT)
Dept: FAMILY MEDICINE | Facility: CLINIC | Age: 32
End: 2022-03-30
Payer: COMMERCIAL

## 2022-04-01 ENCOUNTER — TELEPHONE (OUTPATIENT)
Dept: FAMILY MEDICINE | Facility: CLINIC | Age: 32
End: 2022-04-01
Payer: COMMERCIAL

## 2022-04-01 NOTE — TELEPHONE ENCOUNTER
Kristin from [facility] is asking that a new Restricted Recipient form be completed for patient's gender care at [facility]. Form to Becki's in basket for signature.   Will fax to [phone] when done.

## 2022-04-02 NOTE — PLAN OF CARE
Problem: Manic Symptoms  Goal: Manic Symptoms  Signs and symptoms of listed problems will be absent or manageable.   Patient will report less lability of mood  Patient will maintain appropriate boundaries with peers and staff  Patient speech will be notable for appropriate content, rate, and volume  Patient will report less difficulty falling and staying asleep   Outcome: Improving  Verbalizing that she feels more restless, and irritable on the inside. Outwardly patient looks a little disheveled and asking for prn Zyprexa to help with her irritability. Patient attended community meeting and stated goal was to be positive today. Napping late morning and chose to eat in her room.       02-Apr-2022 00:32

## 2022-04-05 ENCOUNTER — TELEPHONE (OUTPATIENT)
Dept: PSYCHIATRY | Facility: CLINIC | Age: 32
End: 2022-04-05
Payer: COMMERCIAL

## 2022-04-05 DIAGNOSIS — Z79.899 ENCOUNTER FOR LONG-TERM (CURRENT) USE OF MEDICATIONS: Primary | ICD-10-CM

## 2022-04-05 NOTE — TELEPHONE ENCOUNTER
"Spoke with pt via phone, who reports the following:  -Hasn't slept in 3-4 days. No napping either.  -Felt like he was experiencing manic symptoms a few days ago. Symptoms included decreased need for sleep, racing thoughts, and worsening impulsivity. Pt reports they've \"been getting into trouble.\" They state they were arrested a few days ago but would not specify details other than to confirm the reason for arrest did not involve any type of physical violence.   -Currently they describe their mood as, \"flat.\" Feeling physically tired but unable to sleep. Reporting VH for the past two days - seeing people in his room at night. Also has not been leaving his room at the  due to worsening paranoia. Denies AH.   -Substance use: daily, chronic marijuana use. Denies alcohol, cocaine, methamphetamine, or other intoxicants.  -Medications: has been taking Seroquel 50mg at night, although reportedly took 100mg last night without effect. Has hydroxyzine but not using regularly. No other medications.  -Safety: Denies SI/HI. Denies aggressive behavior towards others. Verbally contracts for safety. Is living at a . Feels like they have adequate support systems in place. Was instructed to contact 911 should acute safety concerns develop, which pt agreed to do. Does not feel like he requires hospitalization or a trip to the ED at this time.   -Not eating, but has been drinking protein shakes and water.   -Provides verbal consent for writer to call  with [facility] for update. VAL on file.     Discussion with CM:  -CM in frequent contact with pt.  -Pt disclosed he is currently using methamphetamine, which he asked CM not to disclose to provider due to feeling embarrassed. As of yesterday the pt reported having 2 days left. His current \"binge\" has been about 4-5 days. The plan was for pt to go to detox yesterday but they didn't follow through.  -They were arrested when visiting [place] [facility]. " CM uncertain of specific details but charges included 4 misdemeanors (possession of marijuana, drug paraphernalia  and 1 felony weapon charge, per CM's understanding.  -Pt continues to be on commitment.   -Pt denying SI/HI to CM.   -Supposed to be starting PHP through [facility], was offered a start date of this week but pt declined.   -Pt informed CM that he did sleep last night for a few hours.   -CM does not feel the pt is in acute crisis necessitating hospitalization at this time, but is looking for provider's recommendations.  -Pt has been reaching out to CM frequently an expressed an interest in restarting medications and therapy.

## 2022-04-05 NOTE — TELEPHONE ENCOUNTER
"----- Message from Emily Gibbs RN sent at 4/5/2022 12:21 PM CDT -----  Regarding: FW: Sleep Medication    ----- Message -----  From: Karen Carlton  Sent: 4/5/2022   9:13 AM CDT  To: [facility]  Subject: Sleep Medication                                 Senia the  [facility] stating that  \"Prakash\" has not slept in 4 to 5 days and was wondering if the psychiatrist could prescribe some thing for sleep and is considering going to the hospital or detox. The patients provider is Regine Last. Senia's number is [phone]. And the patients number is [phone].    Thank You,  Karen  "

## 2022-04-05 NOTE — TELEPHONE ENCOUNTER
-Repeat EKG ordered by provider.  -Spoke with pt. He is unable to complete the EKG today but will plan to do so tomorrow. He is aware that in order to increase medications the EKG is needed. Again reiterated instructions to contact 911 or present to the ED for any imminent safety concerns. Encouraged pt to engage in non-stimulating activities. Pt noted that reading typically helps him relax.  -Spoke with CM and provided update on plan.

## 2022-04-07 ENCOUNTER — TELEPHONE (OUTPATIENT)
Dept: PSYCHIATRY | Facility: CLINIC | Age: 32
End: 2022-04-07
Payer: COMMERCIAL

## 2022-04-07 ENCOUNTER — VIRTUAL VISIT (OUTPATIENT)
Dept: PSYCHIATRY | Facility: CLINIC | Age: 32
End: 2022-04-07
Attending: NURSE PRACTITIONER
Payer: COMMERCIAL

## 2022-04-07 DIAGNOSIS — F43.10 PTSD (POST-TRAUMATIC STRESS DISORDER): ICD-10-CM

## 2022-04-07 DIAGNOSIS — F15.21 AMPHETAMINE USE DISORDER, MODERATE, IN EARLY REMISSION (H): ICD-10-CM

## 2022-04-07 DIAGNOSIS — F99 INSOMNIA DUE TO OTHER MENTAL DISORDER: Primary | ICD-10-CM

## 2022-04-07 DIAGNOSIS — F12.20 CANNABIS USE DISORDER, SEVERE, DEPENDENCE (H): ICD-10-CM

## 2022-04-07 DIAGNOSIS — F16.21: ICD-10-CM

## 2022-04-07 DIAGNOSIS — F25.0 SCHIZOAFFECTIVE DISORDER, BIPOLAR TYPE (H): ICD-10-CM

## 2022-04-07 DIAGNOSIS — F51.05 INSOMNIA DUE TO OTHER MENTAL DISORDER: Primary | ICD-10-CM

## 2022-04-07 DIAGNOSIS — F11.21 OPIOID USE DISORDER, MODERATE, IN EARLY REMISSION (H): ICD-10-CM

## 2022-04-07 PROCEDURE — 99215 OFFICE O/P EST HI 40 MIN: CPT | Mod: GT | Performed by: NURSE PRACTITIONER

## 2022-04-07 RX ORDER — QUETIAPINE FUMARATE 50 MG/1
50 TABLET, FILM COATED ORAL AT BEDTIME
Qty: 30 TABLET | Refills: 2 | Status: SHIPPED | OUTPATIENT
Start: 2022-04-07 | End: 2022-06-15
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
## 2022-04-07 NOTE — PATIENT INSTRUCTIONS
-Lexapro and Belsomra are discontinued since you are not taking the medication.  -May continue Seroquel 50 mg at bedtime and Hydroxyzine 25-50 mg up to 4 times a day as needed for sleep and anxiety.    -Follow up in 3 months    **For crisis resources, please see the information at the end of this document**   Patient Education    Thank you for coming to the [facility] [place] [facility].    Lab Testing:  If you had lab testing today and your results are reassuring or normal they will be mailed to you or sent through [facility] within 7 days. If the lab tests need quick action we will call you with the results. The phone number we will call with results is # [phone]. If this is not the best number please call our clinic and change the number.     Medication Refills:  If you need any refills please call your pharmacy and they will contact us. Our fax number for refills is [phone]. Please allow three business days for refill processing.   If you need to change to a different pharmacy, please contact the new pharmacy directly. The new pharmacy will help you get your medications transferred.     Contact Us:  Please call [phone] during business hours (8-5:00 M-F).  If you have medication related questions after clinic hours, or on the weekend, please call [phone].    Financial Assistance [phone]  Medical Records [phone]       MENTAL HEALTH CRISIS RESOURCES:  For a emergency help, please call 911 or go to the nearest Emergency Department.     Emergency Walk-In Options:   EmPATH Unit @ [facility] ([place]): [phone] - Specialized mental health emergency area designed to be calming  [facility] ([place]): [phone]  [facility] ([place]): [phone]  [facility] ([place]): [phone]    [facility] Crisis Information:   [place]: [phone]  [place]: [phone]  [place] ([place]) - Adult:  [phone]     Child: [phone]  [place] - Adult: [phone]     Child: [phone]  [place]: [phone]  List of all [facility] resources:   https://[place].gov/dhs/people-we-serve/adults/health-care/mental-health/resources/crisis-contacts.jsp    National Crisis Information:   Crisis Text Line: Text  [place]  to 542125  National Suicide Prevention Lifeline: 3-078-865-TALK (1-969.953.6870)       For online chat options, visit https://suicidepreventionlifeline.org/chat/  Poison Control Center: 5-514-962-2181  Trans Lifeline: 1-287-527-3475 - Hotline for transgender people of all ages  The [employer] Project: 3-718-084-2351 - Hotline for LGBT youth     For Non-Emergency Support:   Fast Tracker: Mental Health & Substance Use Disorder Resources -   https://www.[employer].org/

## 2022-04-07 NOTE — PROGRESS NOTES
Ayana Prasad is a 31 year old who has consented to receive services via billable video visit.      Pt will join video visit via: Transfercar  If there are problems joining the visit, send backup video invite via: Text to preferred phone: [phone]      Originating Location (patient location): Patient's home  Distant Location (provider location): [facility]    Will anyone else be joining the video visit? No    How would you prefer to obtain AVS?: Regina

## 2022-04-15 ENCOUNTER — VIRTUAL VISIT (OUTPATIENT)
Dept: FAMILY MEDICINE | Facility: CLINIC | Age: 32
End: 2022-04-15
Payer: COMMERCIAL

## 2022-04-15 DIAGNOSIS — F51.04 PSYCHOPHYSIOLOGICAL INSOMNIA: ICD-10-CM

## 2022-04-15 DIAGNOSIS — F31.11 BIPOLAR 1 DISORDER, MANIC, MILD (H): Primary | ICD-10-CM

## 2022-04-15 DIAGNOSIS — F25.0 SCHIZOAFFECTIVE DISORDER, BIPOLAR TYPE (H): ICD-10-CM

## 2022-04-15 PROCEDURE — 99214 OFFICE O/P EST MOD 30 MIN: CPT | Mod: 95 | Performed by: PHYSICIAN ASSISTANT

## 2022-04-15 RX ORDER — LITHIUM CARBONATE 300 MG/1
TABLET, FILM COATED, EXTENDED RELEASE ORAL
Qty: 120 TABLET | Refills: 1 | Status: SHIPPED | OUTPATIENT
Start: 2022-04-15 | End: 2022-05-05

## 2022-04-15 RX ORDER — ZOLPIDEM TARTRATE 10 MG/1
10 TABLET ORAL
Qty: 10 TABLET | Refills: 0 | Status: SHIPPED | OUTPATIENT
Start: 2022-04-15 | End: 2022-05-05 | Stop reason: SINTOL

## 2022-04-15 RX ORDER — ESCITALOPRAM OXALATE 10 MG/1
10 TABLET ORAL DAILY
Qty: 90 TABLET | Refills: 0 | Status: SHIPPED | OUTPATIENT
Start: 2022-04-15 | End: 2022-05-26

## 2022-04-15 NOTE — PROGRESS NOTES
Prakash is a 31 year old who is being evaluated via a billable video visit.      How would you like to obtain your AVS? MyChart  If the video visit is dropped, the invitation should be resent by: [phone]  Will anyone else be joining your video visit? No    Video Start Time: 10:40 AM        Subjective   Prakash is a 31 year old who presents for the following health issues   History of Present Illness       Reason for visit:  Medical advice  Symptom onset:  More than a month  Symptoms include:  M  Symptom intensity:  Severe  Symptom progression:  Staying the same  Had these symptoms before:  Yes  What makes it worse:  N  What makes it better:  N    He eats 0-1 servings of fruits and vegetables daily.He consumes 0 sweetened beverage(s) daily.He exercises with enough effort to increase his heart rate 30 to 60 minutes per day.  He exercises with enough effort to increase his heart rate 5 days per week.   He is taking medications regularly.     History of schizoaffective disorder . Feels like he's been manic for the past 6 weeks. Relapsed with drugs (meth). No longer using. Racing thoughts. Diagnosed with bipolar yrs ago. Has been off of his meds for 3 mos.  Not suicidal. Not sleeping well.     Review of Systems   Constitutional, HEENT, cardiovascular, pulmonary, GI, , musculoskeletal, neuro, skin, endocrine and psych systems are negative, except as otherwise noted.      Objective           Vitals:  No vitals were obtained today due to virtual visit.    Physical Exam   GENERAL: Healthy, alert and no distress  EYES: Eyes grossly normal to inspection.  No discharge or erythema, or obvious scleral/conjunctival abnormalities.  RESP: No audible wheeze, cough, or visible cyanosis.  No visible retractions or increased work of breathing.    SKIN: Visible skin clear. No significant rash, abnormal pigmentation or lesions.  NEURO: Cranial nerves grossly intact.  Mentation and speech appropriate for age.  PSYCH: Mentation  appears normal, affect normal/bright, judgement and insight intact, normal speech and appearance well-groomed.    Ayana was seen today for musculoskeletal problem.    Diagnoses and all orders for this visit:    Bipolar 1 disorder, manic, mild (H)  -     escitalopram (LEXAPRO) 10 MG tablet; Take 1 tablet (10 mg) by mouth daily    Schizoaffective disorder, bipolar type (H)  -     lithium ER (LITHOBID) 300 MG CR tablet; Take 1 tablet (300 mg) by mouth every morning AND 3 tablets (900 mg) At Bedtime.  -     escitalopram (LEXAPRO) 10 MG tablet; Take 1 tablet (10 mg) by mouth daily    Psychophysiological insomnia  -     zolpidem (AMBIEN) 10 MG tablet; Take 1 tablet (10 mg) by mouth nightly as needed for sleep      For now will have him stop seroquel for his insomnia. Short rx for zolpidem to aid in helping him sleep while he restarts meds.   F/u with psych.         Video-Visit Details    Type of service:  Video Visit    Video End Time:11:09 AM    Originating Location (pt. Location): Home    Distant Location (provider location):  [facility]     Platform used for Video Visit: SureDone

## 2022-04-19 ENCOUNTER — DOCUMENTATION ONLY (OUTPATIENT)
Dept: PSYCHIATRY | Facility: CLINIC | Age: 32
End: 2022-04-19
Payer: COMMERCIAL
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
## 2022-04-25 NOTE — PROGRESS NOTES
"Prakash is a 31 year old who is being evaluated via a billable video visit.      How would you like to obtain your AVS? MyChart  If the video visit is dropped, the invitation should be resent by: Text to cell phone: [phone]  Will anyone else be joining your video visit? No      Video Start Time: 11:31 AM    Assessment & Plan     Psychophysiological insomnia  Would not recommend long-term controlled such as Ambien for sleep.  Needs EKG prior to increasing dose of Seroquel.  Unfortunately, mental health provider is unavailable.  Will plan to start amitriptyline short-term until can be reevaluated by mental health provider.  Discussed risk with Prakash-needs to stop taking Seroquel.  Is agreeable and voices understanding.  - amitriptyline (ELAVIL) 10 MG tablet; Take 1-3 tabs nightly as needed for sleep.    Prescription drug management  23 minutes spent on the date of the encounter doing chart review, history and exam, documentation and further activities per the note     BMI:   Estimated body mass index is 37.12 kg/m  as calculated from the following:    Height as of 1/16/22: 1.676 m (5' 6\").    Weight as of 1/16/22: 104.3 kg (230 lb).       No follow-ups on file.    BROOKLYN Cruz CNP  [facility] Randall is a 31 year old who presents for the following health issues     HPI     Chief Complaint   Patient presents with     Recheck Medication       Medication Followup of zolpidem 10 mg    Taking Medication as prescribed: yes    Side Effects:  None    Medication Helping Symptoms:  Yes-needs to discuss taking it long term       Video visit completed due to COVID 19 outbreak.     Was seen by another provider in my abscense and was given prescription for ambien. Would like to discuss taking ambien long term.  Was told at that time to stop taking Seroquel however, has not stopped taking it.  Has been losing track of pieces of time. Has been going on for about 2 weeks. Will be " someplace and does not know how got there. Will occur on days that does not sleep and is hallucinating.  Has tried trazodone trazadone in the past for sleep does not work.  Reports Seroquel is to lower the dose in order to be effective for sleep.  Psychiatry would not increase dose without doing another EKG, he was unwilling to go to clinic to have done.    Review of Systems   Psychiatric/Behavioral: Positive for hallucinations (visual and auditory) and sleep disturbance.            Objective           Vitals:  No vitals were obtained today due to virtual visit.    Physical Exam  Constitutional:       Appearance: Normal appearance.   HENT:      Nose: No congestion.   Eyes:      General: Lids are normal.   Pulmonary:      Effort: No tachypnea, bradypnea or respiratory distress.   Skin:     Coloration: Skin is not ashen, cyanotic, jaundiced or pale.   Neurological:      Mental Status: He is alert.   Psychiatric:         Mood and Affect: Mood normal.         Speech: Speech normal.         Behavior: Behavior normal.              Video-Visit Details    Type of service:  Video Visit    Video End Time:11:48 AM    Originating Location (pt. Location): Home    Distant Location (provider location):  [facility]     Platform used for Video Visit: Elías

## 2022-04-27 ENCOUNTER — TRANSFERRED RECORDS (OUTPATIENT)
Dept: HEALTH INFORMATION MANAGEMENT | Facility: CLINIC | Age: 32
End: 2022-04-27

## 2022-04-27 ENCOUNTER — MEDICAL CORRESPONDENCE (OUTPATIENT)
Dept: HEALTH INFORMATION MANAGEMENT | Facility: CLINIC | Age: 32
End: 2022-04-27

## 2022-04-28 ENCOUNTER — TELEPHONE (OUTPATIENT)
Dept: PSYCHIATRY | Facility: CLINIC | Age: 32
End: 2022-04-28

## 2022-04-28 ENCOUNTER — TELEPHONE (OUTPATIENT)
Dept: PSYCHIATRY | Facility: CLINIC | Age: 32
End: 2022-04-28
Payer: COMMERCIAL

## 2022-04-28 ENCOUNTER — VIRTUAL VISIT (OUTPATIENT)
Dept: FAMILY MEDICINE | Facility: CLINIC | Age: 32
End: 2022-04-28
Payer: COMMERCIAL

## 2022-04-28 DIAGNOSIS — F51.04 PSYCHOPHYSIOLOGICAL INSOMNIA: Primary | ICD-10-CM

## 2022-04-28 PROCEDURE — 99213 OFFICE O/P EST LOW 20 MIN: CPT | Mod: 95 | Performed by: NURSE PRACTITIONER

## 2022-04-28 RX ORDER — AMITRIPTYLINE HYDROCHLORIDE 10 MG/1
TABLET ORAL
Qty: 30 TABLET | Refills: 3 | Status: SHIPPED | OUTPATIENT
Start: 2022-04-28 | End: 2022-05-05 | Stop reason: SINTOL

## 2022-04-28 ASSESSMENT — ENCOUNTER SYMPTOMS
HALLUCINATIONS: 1
SLEEP DISTURBANCE: 1

## 2022-04-28 NOTE — TELEPHONE ENCOUNTER
Writer received 4 fax pages from [facility], including a signed VAL, requesting an updated copy of the patient's medication list. This was printed and attached to the forms, then sent to the provided fax number ([phone]). Karl Canada EMT

## 2022-04-28 NOTE — TELEPHONE ENCOUNTER
Received a return call. He reports that in the past 1 to 1.5 weeks, he has noticed a worsening of auditory hallucinations. He has had them for 10 years, but they are now constant (all day everyday) and are loud and distracting. There are two voices and they are coming from outside his head. He does not recognize the voices. They comment on what is going on or what he is thinking about. They do not tell him to do anything such as harm himself or others. He denies safety concerns.     He wonders if the worsening of the voices has anything to do with better sleep. He said that his sleep has improved in the past 1.5 weeks, and he is sleeping 6-8 hours. He denies nightmares and wakes up feeling refreshed.    Another symptom that he has noticed is that he is losing track of time in terms of hours. This first occurred when he was in [place] for Easter, and he does not remember his flights and how he got home. He implied that he did not always end up getting on the correct flight, but it was eventually figured out. He has found himself in places he does not recall going to.His PCP believed that this is related to zolpidem and discontinued that medication today. However, Prakash said that losing track of time predated him taking zolpidem.    He will start therapy tomorrow at [facility].    He was prescribed amitriptyline by his PCP today, and was told to stop taking quetiapine as these interact. He is taking lithium, escitalopram, and will start amitriptyline tonight.     He has an appointment in June, but would like to see a covering provider sooner.    He agreed to call the clinic or go to the ED if symptoms worsen. Also provided him with the number for on-call.

## 2022-04-28 NOTE — TELEPHONE ENCOUNTER
[facility] Call [facility]    Phone Message    May a detailed message be left on voicemail: yes     Reason for Call: Other: Pt stated he wanted to schedule an appt with Regine to discuss starting back on an anti-psychotic. Pt reports auditory hallucinations. He stated they have been going on for a year, and have gotten worse in the last 2 weeks. Pt reports feeling safe (no harm to himself or others) during this time.      Action Taken: Other: [facility] psych pool    Travel Screening: Not Applicable

## 2022-04-28 NOTE — TELEPHONE ENCOUNTER
Start: 0458 End time: 0526.    Called and talked with Vero issac Arreaga CM.  Discussed Vero the ideal plan would be for pt to be seen at the [facility].  However, he is a restricted pt and unsure if prescription written by anyone who is not on the restricted list (PCP or this writer) would be accepted.  Vero does not know pt is a restricted status.  Will ask nursing staff to explore restricted status and if provider outside of can prescribe, to have [facility] provider see pt asap.  However, if the prescribing is only allowed by restricted provider, will have pt see a provider at the [facility] and [facility] provider contact PCP to prescribe the medication.  Will update Vero tomorrow on the plan. Regine Last, CNP, 4/28/2022

## 2022-04-29 ENCOUNTER — TELEPHONE (OUTPATIENT)
Dept: PSYCHIATRY | Facility: CLINIC | Age: 32
End: 2022-04-29
Payer: COMMERCIAL

## 2022-04-29 ENCOUNTER — TELEPHONE (OUTPATIENT)
Dept: BEHAVIORAL HEALTH | Facility: CLINIC | Age: 32
End: 2022-04-29
Payer: COMMERCIAL

## 2022-04-29 NOTE — TELEPHONE ENCOUNTER
Spoke to Vero and let her know that clinic would be scheduling.     Called Ayana listed in care team as [facility] restricted  and she is no longer the . She did give the main restriction number to speak to someone to get the provider added. [phone]. Called and left message on the voicemail as this is the only option. Message states that voicemail is checked multiple times a day. Writer left direct number for Sussy to call back.

## 2022-04-29 NOTE — TELEPHONE ENCOUNTER
[facility] ([facility]):     NURSING Post-Consultation Referral Review  _________________________________________    This RN has consulted with provider & this Medication Management referral to the [facility] is deemed   [x] Appropriate  [] Inappropriate     Based on the following additional information gathered:    Regine Last, BROOKLYN CNP  Lucie Chan, RN; Becki Sommers APRN CNP; [facility] Rn Pool 51 minutes ago (10:28 AM)     YN    [facility] schedule team: Pls schedule this pt with Becki as soon as possible.       Becki: As noted in separate message, you are now added to his restricted provider, so you can see him and prescribe the medication.  I will contact CM to update.     Thank you!    Message text         Contact w/ patient/parent: Wtr made 1st attempt to reach pt, LVM requesting return call to schedule & orienting to Mychart msg, sent msg as well.     Adiel Farfan, RN on April 29, 2022 at 11:20 AM
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
## 2022-04-29 NOTE — TELEPHONE ENCOUNTER
[facility] Call Center    Phone Message    May a detailed message be left on voicemail: yes     Reason for Call:   Vero called to relay more information that she received after speaking with her Supervisor. They said that in order for Prakash to be seen by another psychiatrist provider, Prakash's PCP much authorize the provider as they did with Regine.    Vero asks that whether if it's Regine or RN that will reaching back out to her, she just needs clear communication if clinic will still be moving forwards with scheduling patient. She needs to know to help with care coordination.      Action Taken: Message routed to:  Other: [facility]DER[facility]N PSYCHIATRY NURSE[facility]    Travel Screening: Not Applicable

## 2022-04-29 NOTE — TELEPHONE ENCOUNTER
Spoke with Kristin from [facility] number is [phone]. Kristin will have Becki Ayon added for the time that Regine is gone. She will leave Regine as the restricted provider also so that we do not have to re-add when Regine comes back. She will check to see if this can be signed off on prior to PCP coming back 5/1 or if we have to wait. She will call back to let us know once the paperwork has gone through so that we can get the patient scheduled.

## 2022-04-30 NOTE — TELEPHONE ENCOUNTER
Regine Last, APRN CNP  You 2 days ago     YN    I called CM, Vero Arreaga ([phone]) to [facility] to ask if he can be seen at the [facility] as he is resticted pt to Becki Avery, PCP and me.  Looks like Becki discontinued Seroquel to start Amitriptyline today.  She is out until 5/1, looks like.  I do not want to restart/recommend medication that PCP discontinued.  Also, pt's insurance may not approve prescription outside from Becki Avery, PCP and me, so it may be best to follow up with PCP in this case.     I will check here to see if Vero calls back.  This is complicated case, but Vero and I have been discussing that pt would likely be hospitalized at one point as he stops taking medications and using substance consistently.  He is on commitment, but currently not danger to self or others.  This may be a good point to bring him to the hospital rather than trying to manage care at outpatient.    Message text        Regine Last, APRN NELLY  You; Becki Sommers, APRN CNP; Adiel Farfan, RN; Becki Avery, APRMARY CNP 2 days ago     YN    So I am including Becki Avery who is pt's PCP.  I don't have any opening tomorrow and I will be out for the entire month of May.  Becki and I are pt's restricted provider, so I am not sure if being seen at ED or elsewhere, if pt is able to receive prescription.  I also see Becki will be out until 5/1.     Pt recently stopped all the medications, but agreed to just to take Seroquel.  But pt has hx of elevated QTC with Seroquel, so I agreed to start Seroquel 50 mg HS after QTC was 440 on 4/7/2022.  Pt has not had good rapport with me, but after 4/7 visit, he did not want to schedule follow up visit with me.  He has hx of seeing different psych providers in the past.  Has a good CM from [facility], but also does not know him well.  I will also give a call to CM to report the plan. But if he is restricted, seeing Becki at [facility] may not  provide help in terms of starting medication.     Becki Avery: (I realized both of you are Becki ANDERSON!)  do you know if pt is prescribed medication at the [facility], if this would be dispensed?     Thank you   mattie

## 2022-05-04 ENCOUNTER — APPOINTMENT (OUTPATIENT)
Dept: GENERAL RADIOLOGY | Facility: CLINIC | Age: 32
End: 2022-05-04
Attending: EMERGENCY MEDICINE
Payer: COMMERCIAL

## 2022-05-04 VITALS
BODY MASS INDEX: 36.96 KG/M2 | RESPIRATION RATE: 16 BRPM | DIASTOLIC BLOOD PRESSURE: 99 MMHG | HEIGHT: 66 IN | OXYGEN SATURATION: 96 % | TEMPERATURE: 98.8 F | SYSTOLIC BLOOD PRESSURE: 160 MMHG | WEIGHT: 230 LBS | HEART RATE: 116 BPM

## 2022-05-04 PROCEDURE — 71046 X-RAY EXAM CHEST 2 VIEWS: CPT

## 2022-05-04 PROCEDURE — 999N000104 HC STATISTIC NO CHARGE

## 2022-05-05 ENCOUNTER — VIRTUAL VISIT (OUTPATIENT)
Dept: BEHAVIORAL HEALTH | Facility: CLINIC | Age: 32
End: 2022-05-05
Attending: NURSE PRACTITIONER
Payer: COMMERCIAL

## 2022-05-05 ENCOUNTER — HOSPITAL ENCOUNTER (EMERGENCY)
Facility: CLINIC | Age: 32
Discharge: LEFT BEFORE DISCHARGE | End: 2022-05-05
Admitting: EMERGENCY MEDICINE
Payer: COMMERCIAL

## 2022-05-05 VITALS
BODY MASS INDEX: 36.96 KG/M2 | TEMPERATURE: 97.6 F | RESPIRATION RATE: 16 BRPM | OXYGEN SATURATION: 98 % | WEIGHT: 230 LBS | SYSTOLIC BLOOD PRESSURE: 140 MMHG | HEART RATE: 105 BPM | HEIGHT: 66 IN | DIASTOLIC BLOOD PRESSURE: 89 MMHG

## 2022-05-05 DIAGNOSIS — F19.10 POLYSUBSTANCE ABUSE (H): ICD-10-CM

## 2022-05-05 DIAGNOSIS — F99 INSOMNIA DUE TO OTHER MENTAL DISORDER: ICD-10-CM

## 2022-05-05 DIAGNOSIS — F51.05 INSOMNIA DUE TO OTHER MENTAL DISORDER: ICD-10-CM

## 2022-05-05 DIAGNOSIS — F43.10 PTSD (POST-TRAUMATIC STRESS DISORDER): ICD-10-CM

## 2022-05-05 DIAGNOSIS — F25.0 SCHIZOAFFECTIVE DISORDER, BIPOLAR TYPE (H): Primary | ICD-10-CM

## 2022-05-05 DIAGNOSIS — F60.3 BORDERLINE PERSONALITY DISORDER (H): ICD-10-CM

## 2022-05-05 PROCEDURE — 999N000104 HC STATISTIC NO CHARGE

## 2022-05-05 PROCEDURE — 99215 OFFICE O/P EST HI 40 MIN: CPT | Mod: 95 | Performed by: NURSE PRACTITIONER

## 2022-05-05 PROCEDURE — 93005 ELECTROCARDIOGRAM TRACING: CPT

## 2022-05-05 RX ORDER — OLANZAPINE 20 MG/1
20 TABLET ORAL AT BEDTIME
Qty: 30 TABLET | Refills: 1 | Status: SHIPPED | OUTPATIENT
Start: 2022-05-05 | End: 2022-06-22

## 2022-05-05 RX ORDER — LITHIUM CARBONATE 300 MG/1
900 TABLET, FILM COATED, EXTENDED RELEASE ORAL AT BEDTIME
Qty: 90 TABLET | Refills: 1 | Status: SHIPPED | OUTPATIENT
Start: 2022-05-05 | End: 2022-07-07

## 2022-05-05 ASSESSMENT — ANXIETY QUESTIONNAIRES
4. TROUBLE RELAXING: NOT AT ALL
6. BECOMING EASILY ANNOYED OR IRRITABLE: NOT AT ALL
1. FEELING NERVOUS, ANXIOUS, OR ON EDGE: MORE THAN HALF THE DAYS
2. NOT BEING ABLE TO STOP OR CONTROL WORRYING: NOT AT ALL
3. WORRYING TOO MUCH ABOUT DIFFERENT THINGS: MORE THAN HALF THE DAYS
7. FEELING AFRAID AS IF SOMETHING AWFUL MIGHT HAPPEN: NOT AT ALL
5. BEING SO RESTLESS THAT IT IS HARD TO SIT STILL: NOT AT ALL
GAD7 TOTAL SCORE: 4

## 2022-05-05 ASSESSMENT — PATIENT HEALTH QUESTIONNAIRE - PHQ9: SUM OF ALL RESPONSES TO PHQ QUESTIONS 1-9: 3

## 2022-05-05 NOTE — PROGRESS NOTES
" This video/telephone visit will be conducted virtually between you and your provider. We have found that certain health care needs can be provided without the need for an in-person physical exam. This service lets us provide the care you need with a video /telephone conversation. If a prescription is necessary we can send it directly to your pharmacy. If lab work is needed we can place an order for that and you can then stop by our lab to have the test done at a later time.\"   Just as we bill insurance for in-person visits, we also bill insurance for video/telephone visits. If you have questions about your insurance coverage, we recommend that you speak with your insurance company.      Patient/Parent has given verbal consent for video/Telephone visit? yes    Patient would like the video visit invitation sent by: Text to cell phone: [employer]    Patient verified allergies, medications and pharmacy via phone. Patient states they are ready for visit.      [facility] ([facility] ([facility]):     Provides Patient Support While Waiting to Access Programmatic and Outpatient MH&A Care and Provides [facility] Assessment Services     INTAKE  ____________________________________________________    \"The [facility] is a temporary psychiatry service that helps to bridge the time to your next appointment. It is not intended to be a long-term service and you are expected to attend your scheduled appointment with your next provider.\"  [x] Patient/Parent verbalized understanding    If you need support between appointments, please call [phone] and let them know you're seen by [facility] Psychiatry. You may also send a [employer] message to reach us.    General-     Most pressing MH needs at this time: pt reports, \"sleep- I get caught up in responding to voices & stuck in my head at night and I get lost\"       Any physical health conditions or diagnoses we should be aware of or that are impacting " you: no       Medications-     Injectable medications currently prescribed: no   If yes, do you need an appointment for the next injection:     Any Controlled Substances that you are prescribed: yes-ambien       Primary care provider: BROOKLYN Cruz CNP        AVA-7 scores: TODAY:  4   AVA-7 SCORE 7/16/2021 3/21/2022   Total Score - -   Total Score - 15 (severe anxiety)   Total Score 14 15       PHQ-9 scores: TODAY: 3    PHQ-9 SCORE 3/2/2022 3/21/2022   PHQ-9 Total Score - -   PHQ-9 Total Score MyChart 0 3 (Minimal depression)   PHQ-9 Total Score 0 3           Anything the provider should be aware of for today's appointment: ED visit yesterday/today for coughing/breathing    New (awaiting) Mental health provider or next programming: SUSANNAH Last      Date of scheduled apt: 6/17/22         Adiel Farfan RN on May 5, 2022 at 10:01 AM

## 2022-05-05 NOTE — PROGRESS NOTES
[facility]      Mental Health & Addiction Service Line    Transition Clinic: Psychiatry Progress Note  Medication Management                Charts/documentation read prior to the appointment:  -2022    -2022            VISIT INFORMATION      Date:  May 5, 2022       Number:  -1st      Referral source:  -[facility] for long term outpatient psychiatry      Patient Identifying Information:  Legal name: Ayana Prasad  Preferred name: Prakash  : 1990  Preferred pronouns: He/him      Participants:   -Patient  -Provider        Telehealth visit details:  Type of service:   Video  Patient location:   At home  Provider Location: [facility]  Platform utilized: Explorra    Start time: 2:39 pm  End time:  2:57 pm        INTERIM HX    -Was feeling impulsive, not sleeping, and doing stupid things  -Started back on psychotropics in 2022    -Met with DEVIN BARAHONA-CNP on 2022  -Since going back on meds I still can't sleep and now experiencing AH  -Met with PCP on 2022 and given Amitriptyline    ---------------------------------------    -On SSDI/don't work  -Mostly hang out at my apartment ... using THC multiple times   throughout the day        PSYCHIATRIC ROS      Sleep:  -Can't fall asleep ... ongoing insomnia  -Only getting a few hours at a time ... maybe 3 to 5 total    Amitriptyline   -10 to 20 mg = ineffective   -30 mg = dizziness, therefore I discontinued         Trauma and or PTSD:  -Hx of trauma    -Denies current flashbacks, intrusive memories of prior trauma, hypervigilance         Mood/Anxiety:    -See PHQ-9 score    -See AVA-7 score        Bella/Hypomania:    Denies in the interim hx:  -Need for less sleep with an increase in goal directed activities  -Inflated self esteem, grandiose thoughts  -Increased spending or gambling  -Engaging in high risk behaviors  -Excessive feelings of irritability or  "impulsivity      Psychosis:  +AH, Paranoia       Suicidal ideations:  -Denies at this time        SIB:  -Denies engaging in self harm         Side effects:  -See above for Amitriptyline   -Ambien recently seemed to contribute to memory impairment        PSYCHIATRIC HISTORY    Pt initially seen on 9/2013 at this clinic with residents. Initial DA notes indicated that depression and anxiety started after \"all drugs\" in 2010. AH started around college. Multiple psychiatric hospitalizations starting 2013, last admission 2019.  Last haven 2019. 3 suicide attempts (accidental overdose, carbon monoxide poisoning).     Notes DOC as cannabis, but also used methamphetamine and opioid.  Never had substance use with injection. Hx of substance use treatment program. Also was in [facility] and completed, but recently in 2021 spring, was not accepted at first psychosis or [facility] program.     Psych critical item history includes 3 suicide attempts, last 2019, trauma hx, multiple medication trials, multiple hospitalizations (estimates +25, first admitted in 2011 for overdose, last 2019 for haven and depression), substance use, substance treatment (2015 and 2017). Committed x 2 (2017 and 2019). Previous provider note indicated violent behavior, alcohol use and heroin use.      SNRIs  -Cymbalta 20 to 30 mg    SSRIs  -Prozac 20 mg: ineffective     Other antidepressants:  -Remeron 7.5 mg: unsure of effectiveness    Other anxiolytics:  -Hydroxyzine HCL and catalina 25 to 50 mg: ineffective, worsened urinary retention  -Buspar     Beta Blocker:  -Propranolol 10 to 20mg: effective, poorly tolerated- may have dropped BP    Mood stabilizers:  -Lamotrigine    Antipsychotics:   -Abilify 10 to 15 mg   -Geodon 80 mg: limited efficacy  -Haldol: allergy, agitating  -Latuda 40 mg   -Prolixin  -Risperdal 0.25 -1 mg: allergy  -Seroquel: QT prolongation  -Zyprexa 5-10 mg: effective    Alpha receptor:  -Intuniv and Tenex 1mg (both effective, severe " "dry mouth with guanfacine  -Prazosin    Stimulants:  -Adderall XR 10 to 20 mg  -Concerta 18 mg: sedation  -Vyvanse 20 mg: effective, \"better than Adderall\"  -Strattera 60 to 80 mg: effective    Benzodiazepines:  -Halcion  -Lorazepam 0.5 mg: effective   -Stelazine 2-6 mg: effective  -Xanax 0.5 mg: sedation    Sleep aides:  -Ambien: amnesia, memory impairment   -Belsomra: discontinued on own  -Lunesta 2-3 mg   -Melatonin 10 mg: ineffective  -Trazodone  mg: ineffective          CURRENT SUBSTANCE USE    Alcohol:  -None reported     Recreational Drugs:  -THC multiple times per day  -Methamphetamines   -MDMA    Medical Marijuana:  -None reported    Cigarettes per day:  -None reported    Chewing tobacco:  -None reported    Vaping:  -None reported    Caffeine intake:  -None reported           MEDICAL HISTORY  -The problem list was reviewed via the EMR prior to the appointment        MEDICATIONS      Current Outpatient Medications:      amitriptyline (ELAVIL) 10 MG tablet, Take 1-3 tabs nightly as needed for sleep., Disp: 30 tablet, Rfl: 3     escitalopram (LEXAPRO) 10 MG tablet, Take 1 tablet (10 mg) by mouth daily, Disp: 90 tablet, Rfl: 0     lithium ER (LITHOBID) 300 MG CR tablet, Take 1 tablet (300 mg) by mouth every morning AND 3 tablets (900 mg) At Bedtime., Disp: 120 tablet, Rfl: 1     ondansetron (ZOFRAN ODT) 4 MG ODT tab, Take 1 tablet (4 mg) by mouth every 6 hours as needed for nausea or vomiting (Patient not taking: Reported on 4/28/2022), Disp: 15 tablet, Rfl: 0     QUEtiapine (SEROQUEL) 50 MG tablet, Take 1 tablet (50 mg) by mouth At Bedtime, Disp: 30 tablet, Rfl: 2     zolpidem (AMBIEN) 10 MG tablet, Take 1 tablet (10 mg) by mouth nightly as needed for sleep, Disp: 10 tablet, Rfl: 0      If a controlled substance is prescribed during today's appointment:    -The [place] Prescription Monitoring Program has been reviewed and there are no current concerns with: diversionary activity, early refill requests, " and or obtaining the medication from multiple providers.        VITALS    BP Readings from Last 3 Encounters:   05/04/22 (!) 160/99   01/16/22 (!) 161/114   12/15/21 124/85       Pulse Readings from Last 3 Encounters:   05/04/22 116   01/16/22 94   12/15/21 97         LABS    The following labs were reviewed prior to or during the appointment:  -1/16/2022        SCALES    PHQ 11/24/2021 3/2/2022 3/21/2022   PHQ-9 Total Score 0 0 3   Q9: Thoughts of better off dead/self-harm past 2 weeks Not at all Not at all Not at all        AVA-7 SCORE 3/3/2021 7/16/2021 3/21/2022   Total Score - - -   Total Score - - 15 (severe anxiety)   Total Score 11 14 15               MENTAL STATUS EXAMINATION    Appearance: Adequately Groomed, Wearing sunglasses  General Behavior:  Cooperative  Speech: Monotone  Musculoskeletal:    -Gait not observed during t.h. visit  -No facial tics/tremors observed   -Motor coordination is grossly intact   Mood: Alright  Affect: Tired, Flat  Attention: Intact  Orientation:  Person, Place, Time, Situation  Thought Associations:  Intact  Thought Content: +AH   Thought Processes: Organized, Normal rate  Memory:  -Per patient report it's not good, time lapses  -No screening or formal testing performed  Language: Intact  Judgement: Fair to poor  Insight:  Fair to poor         ASSESSMENT/CLINICAL IMPRESSIONS    Summary:    Santi Prasad is a transgender person (female to male) with a history of:  ADHD, BPD, Schizoaffective Bipolar Type, PTSD, and Polysubstance abuse.    Currently psychotropics are managed by  DEVIN HUI who is on a KIM during May/2022, therefore is attending today's appointment for bridging services.    It is noteworthy Prakash is on a restricted prescriber list.   During today's appointment he outlines being unemployed, doesn't seem to have any daytime structure, and uses THC multiple times   on a daily basis.   Comments abusing methamphetamines and MDMA intermittently  (although doesn't further quantify frequency).    Since last evaluation on 4/7/2022 continues to experience AH and can't fall or stay asleep throughout the night.        Thus far has tried multiple antipsychotics ... given there were no prior adverse effects from Zyprexa, restarted this.    Oddly, Prakash expresses desire to decrease AH but doesn't want to eliminate them completely.     Presentation is tired and ambivalent.   Is not responding to internal stimuli, nor is Prakash demonstrating any overt s/s of psychosis or haven/hypomania.    He denies any immediate concerns towards self or others;  Prakash was reminded to go to the nearest ED if mental health symptoms worsen between now and follow up with inability to maintain safety.          DSM-V and or working diagnosis:    1. Schizoaffective disorder, bipolar type (H)    2. Borderline Personality Disorder    3. Polysubstance abuse    4. PTSD    5.  Insomnia due to other mental disorder      Rule outs:    6. Substance induced psychosis versus above dx of Schizoaffective Bipolar           SAFETY EVALUATION:    Suicidal ideations:  -denies  Homicidal ideations:  -denies  Protective and mitigating factors:  -case management  -involved in outpatient mental health services  Risk factors:  -[id number]+ demographic  -prior attempts  -hx of impulsive, erractic behaviors   Risk assessment:  -low to medium            TREATMENT PLAN      Medications:  Start:  Olanzapine/Zyprexa 20 mg at bedtime      Continue:  Escitalopram/Lexapro 10 mg daily    Lithium/Lithobid  mg (3 tabs) at bedtime    Quetiapine/Seroquel 50 mg at bedtime    -No increase > 50 mg  (QTc = 440 on 3/17/2022,  459 on 5/5/2022 )       Labs:  -None obtained        Therapy:  -As needed (either individual or group)        Non-pharmacological modalities:  -Encouraged cutting back on all recreational and illicit substances         Return to Clinic or Referrals:  -Please follow up at the [facility] for  medication management in: 3 weeks            Total time:  46 minutes per:    -Review of EMR   -Appointment time   -Care coordination (before and after appt)  -Documentation        Becki HUI [facility]    ---------------------------------------------------------------------------------------------------------------------------------        TREATMENT RISK STATEMENT    The risks, benefits, alternatives, and potential adverse effects have been explained and are understood by the patient.  The patient agrees to the treatment plan with their ability to do so.      The patient knows to call the clinic: [phone]  for any problems or concerns until the next psychiatry visit, regardless if it is within or outside of the [facility] system.     If unable to reach clinic staff (via phone call or medical messaging) during the normal business hours: 8:00 am to 4:30 pm then it is recommended accessing the nearest: emergency department, urgent care facility, or utilizing local (varies based on county of residence) and national crisis #'s or text messaging services for immediate assistance.          ---------------------------------------------------------------------------------------------------------------------------------        If applicable the following has been discussed with the patient, parent/guardian, and or attending family member during the appointment:      1. Risks of polypharmacy and possible drug interactions with current medication list + common OTC products, herbs, and supplements.    Moving forward, it is suggested to intermittently check-in with a clinic or retail pharmacist whenever new medications or OTC/h/s are consumed.    2. Recommendation to adhere to CDC guidelines as it relates alcohol consumption.  If taking benzodiazepines, you should abstain from alcohol intake due to increased risks of CNS and respiratory depression, as well as psychomotor impairment.    3. If possible, it is  recommended to avoid concurrent use of prescribed:  opioids  +  benzodiazepines due to increased risks of CNS and respiratory depression, as well as the increased risk of overdose.     4. Recommendation to minimize and or abstain from THC use (unless the pt. is prescribed medical marijuana).    5. Recommendation to abstain from illicit substances including but not limited to the following: heroin, street fentanyl, cocaine, methamphetamines, and bath salts.    6. Do not take opioids, stimulants, and or other prescription medications unless they are specifically prescribed for you.    7. Recommendation to abstain from tobacco/smoking, alcohol, THC, and all illicit substances if trying to become or are pregnant.    8. Black Box Warnings associated with the prescribed psychotropic(s).    9. Potential adverse effects of antipsychotics including but not limited to the following: weight gain, metabolic syndrome, EPS/Tardive Dyskinesias.    10. Potential CV and neurological adverse effects of stimulants including but not limited to the following:  sudden death, MI, stroke, HTN, cardiomyopathy (long term use) as well as seizures.

## 2022-05-05 NOTE — ED TRIAGE NOTES
EMS arrival:    Patient sitting outside of group home.  Woke up 2 ft away from where he was sitting on sidewalk.  Abrasion to bridge of nose & left of eye.  Denies symptoms prior.   States seen yesterday but left.  Here for rib pain, lung pain.      Triage Assessment     Row Name 05/05/22 [id number]       Triage Assessment (Adult)    Airway WDL WDL       Respiratory WDL    Respiratory WDL WDL       Skin Circulation/Temperature WDL    Skin Circulation/Temperature WDL X  Facial abrasion       Cardiac WDL    Cardiac WDL WDL       Peripheral/Neurovascular WDL    Peripheral Neurovascular WDL WDL       Cognitive/Neuro/Behavioral WDL    Cognitive/Neuro/Behavioral WDL X  Reports syncope today.        Dayanara Coma Scale    Best Eye Response 4-->(E4) spontaneous    Best Motor Response 6-->(M6) obeys commands    Best Verbal Response 5-->(V5) oriented    Day[place]ara Coma Scale Score 15

## 2022-05-05 NOTE — ED TRIAGE NOTES
Patient states coughing from smoking today.  Canaan like her lung popped & now having right lung pain, pain with breathing.      Triage Assessment     Row Name 05/04/22 1957       Triage Assessment (Adult)    Airway WDL WDL       Respiratory WDL    Respiratory WDL X;all    Rhythm/Pattern, Respiratory unlabored  Pain with breathing    Expansion/Accessory Muscles/Retractions expansion symmetric       Skin Circulation/Temperature WDL    Skin Circulation/Temperature WDL WDL       Cardiac WDL    Cardiac WDL WDL       Peripheral/Neurovascular WDL    Peripheral Neurovascular WDL WDL       Cognitive/Neuro/Behavioral WDL    Cognitive/Neuro/Behavioral WDL WDL

## 2022-05-05 NOTE — PATIENT INSTRUCTIONS
Start:  Zyprexa 20 mg at bedtime     Continue:  Lexapro 10 mg daily     Lithium  mg (3 tabs) at bedtime     Seroquel 50 mg at bedtime

## 2022-05-06 LAB
ATRIAL RATE - MUSE: 90 BPM
DIASTOLIC BLOOD PRESSURE - MUSE: NORMAL MMHG
INTERPRETATION ECG - MUSE: NORMAL
P AXIS - MUSE: 26 DEGREES
PR INTERVAL - MUSE: 152 MS
QRS DURATION - MUSE: 88 MS
QT - MUSE: 376 MS
QTC - MUSE: 459 MS
R AXIS - MUSE: 6 DEGREES
SYSTOLIC BLOOD PRESSURE - MUSE: NORMAL MMHG
T AXIS - MUSE: 11 DEGREES
VENTRICULAR RATE- MUSE: 90 BPM

## 2022-05-06 ASSESSMENT — ANXIETY QUESTIONNAIRES: GAD7 TOTAL SCORE: 4

## 2022-05-09 NOTE — TELEPHONE ENCOUNTER
Received phone call from Kristin at [facility], [phone], with an update on having Becki Sommers from the [facility] added as restricted provider.  Kristin states she has not heard back yet from the PCP clinic and has just sent in her third request.  She asks that we follow-up with PCP clinic also.

## 2022-05-11 ENCOUNTER — TELEPHONE (OUTPATIENT)
Dept: FAMILY MEDICINE | Facility: CLINIC | Age: 32
End: 2022-05-11
Payer: COMMERCIAL

## 2022-05-11 NOTE — TELEPHONE ENCOUNTER
Restricted Recipient forms have been received so that patient may see Becki Sommers in Psych. Placed in Becki Avery's in basket.

## 2022-05-12 ENCOUNTER — MYC MEDICAL ADVICE (OUTPATIENT)
Dept: BEHAVIORAL HEALTH | Facility: CLINIC | Age: 32
End: 2022-05-12
Payer: COMMERCIAL

## 2022-05-12 NOTE — TELEPHONE ENCOUNTER
"Return call placed to Prakash to assess patients safety regarding MyChart message to Becki Sommers.  Patient has future first time new patient  appointment with Becki Sommers 5/26/22.  Prakash  answered the phone.  Prakash stated, \"I am OK.  I may need something for my impulsiveness. I stole something from a store and that is not me.\"  Patient denies suicidal or homicidal ideations.  Patient was able to contract for safety.  He denied he was in a any sense of emergency.  He stated, \"I feel great. I am at my place.\"  He admitted to hearing voice non command type.  He stated, The voices tell me what's going on.  They comment on conversations.\"  He admits to racing thoughts.  Patient has diagnosis of Schizoaffective Disorder Bipolar Type. Patient denies being a danger to himself or others.He stated, \"My Family doctor doesn't prescribe any psych meds.\"  RN reminded Prakash that Becki Reneueger needs to have a visit with him before prescribing medications.       Patient was educated as to the following:      If you need urgent help, please call your local crisis number, 911, or go to your nearest emergency room.     [facility] has a new mental health emergency area at [facility] called [facility] that is very calming and helpful if you need additional support. ([address] [place] [address]  [phone]).     Once at the [facility], you will walk into the Emergency Department and say that you are there for [facility] and they will show you where to go. You will be evaluated by a medical provider and then have a visit with the [facility] team. This is a team of mental health providers who can assess your needs and connect you with resources and medication in a timely manner.     Patient thanked RN and said, \"I will think about it.\"  He then stated, \"I don't like going there because they lock me up and take my phone and stuff.  They don't do that at [facility].\"  Patient assured RN that he " would utilize above resources if needed.  Patient thanked RN and looks forward to seeing Becki Sommers later this month.

## 2022-05-16 DIAGNOSIS — M54.9 INTRACTABLE BACK PAIN: ICD-10-CM

## 2022-05-16 DIAGNOSIS — F41.9 ANXIETY: ICD-10-CM

## 2022-05-16 DIAGNOSIS — F31.11 BIPOLAR 1 DISORDER, MANIC, MILD (H): ICD-10-CM

## 2022-05-18 NOTE — PROGRESS NOTES
"Prakash is a 31 year old who is being evaluated via a billable video visit.      How would you like to obtain your AVS? MyChart  If the video visit is dropped, the invitation should be resent by: Text to cell phone: [phone]  Will anyone else be joining your video visit? No      Video Start Time: 11:36 AM    Assessment & Plan     Fainting spell  EKG from emergency department reviewed  We will set up for lab only appointment.  Encouraged to eat more regular meals.  Check blood pressure during spells.  Education given regarding concerning signs to watch for and when would need to seek immediate medical treatment.  - CBC with Platelets & Differential; Future  - Basic metabolic panel; Future  - TSH with free T4 reflex; Future  - Lithium level; Future    Poor impulse control  Previous psychiatry notes reviewed.  Behavior is concerning and different from baseline.  Encouraged to addressed with psychiatry.    Rib pain on right side  Plans to follow-up with chiropractor.    Borderline personality disorder (H)  Previous psychiatry notes reviewed.  Continue to follow with psychiatry.      Review of the result(s) of each unique test - Labs  44 minutes spent on the date of the encounter doing chart review, history and exam, documentation and further activities per the note     BMI:   Estimated body mass index is 37.12 kg/m  as calculated from the following:    Height as of 5/5/22: 1.676 m (5' 6\").    Weight as of 5/5/22: 104.3 kg (230 lb).       No follow-ups on file.    BROOKLYN Cruz CNP  [facility] SHAILESH Randall is a 31 year old who presents for the following health issues     Chief Complaint   Patient presents with     Syncope     Fainting spells and weakness in hands for 1-2 weeks.        HPI     Video visit completed due to COVID 19 outbreak.     Has been having fainting spells.  Reports last week was sitting on front step of house and fell forward and landed on face.  Did hit " "head.  Did go to the emergency department however, wanted to smoke and so left without being seen.  Does not have any headaches, dizziness or lightheaded.  Has had a few other times where he has felt very faint.  Tries to drink a lot of water.  Typically does not eat until 6 or 7 PM.  Will be a very large meal at that time.     Right sided rib pain. Otoe a popping sound to right side.  Has been coughing a lot.  Was sitting up in the chair when this occurred.  Went to ER and had x-ray that was unremarkable.-left ribs still hurt a lot. Laying on that side hurts. Feels like a weight on right ribs.  When looks at this area it appears normal. Pain is mostly under right breast.     Easter weekend was at parents house.  Was being snowboard behind a 4 hoffmann.  Had an \" accident\" and crashed.  Since having this accident arms and hands are weaker.  Will noticed that will drop things like phone or dog leash.  No neck pain.  No numbness or tingling.  No lower back pain.  No loss of control of bowels or bladder.  Yesterday, was rollerblading downhill and Going faster and faster.  Fell really hard.  Did not hit head.  Does not wear a helmet.  Has noticed since started Zyprexa is doing more harmful things.  Recently, has been feeling very impulsive.  Started stealing.  Has never stolen before in his life.  Very concerned about this new behavior noticed with himself.    Has been clean and sober for 1 month    Review of Systems   Constitutional: Negative for fatigue.   HENT: Negative for ear pain, rhinorrhea and sore throat.    Eyes: Negative for visual disturbance.   Respiratory: Negative for cough, chest tightness, shortness of breath and wheezing.    Cardiovascular: Negative for chest pain and palpitations.   Gastrointestinal: Negative for abdominal distention, abdominal pain, constipation, diarrhea, nausea and vomiting.   Endocrine: Negative for cold intolerance and heat intolerance.   Musculoskeletal: Negative for arthralgias " and myalgias.        Right sided rib pain     Skin: Negative for rash.   Neurological: Positive for weakness (upper extremities). Negative for dizziness, light-headedness, numbness and headaches.        Fainting     Psychiatric/Behavioral: Negative for dysphoric mood and sleep disturbance. The patient is not nervous/anxious.         Impulsive, harmful behavior.          Objective           Vitals:  No vitals were obtained today due to virtual visit.    Physical Exam  Constitutional:       Appearance: Normal appearance.   HENT:      Nose: No congestion.   Eyes:      General: Lids are normal.   Pulmonary:      Effort: No tachypnea, bradypnea or respiratory distress.   Skin:     Coloration: Skin is not ashen, cyanotic, jaundiced or pale.   Neurological:      Mental Status: He is alert.   Psychiatric:         Mood and Affect: Mood normal.         Speech: Speech normal.         Behavior: Behavior normal.              Video-Visit Details    Type of service:  Video Visit    Video End Time:12:03 PM    Originating Location (pt. Location): Home    Distant Location (provider location):  [facility]     Platform used for Video Visit: DennisHelium

## 2022-05-19 ENCOUNTER — VIRTUAL VISIT (OUTPATIENT)
Dept: FAMILY MEDICINE | Facility: CLINIC | Age: 32
End: 2022-05-19
Payer: COMMERCIAL

## 2022-05-19 DIAGNOSIS — R45.87 POOR IMPULSE CONTROL: ICD-10-CM

## 2022-05-19 DIAGNOSIS — R07.81 RIB PAIN ON RIGHT SIDE: ICD-10-CM

## 2022-05-19 DIAGNOSIS — F60.3 BORDERLINE PERSONALITY DISORDER (H): ICD-10-CM

## 2022-05-19 DIAGNOSIS — R55 FAINTING SPELL: Primary | ICD-10-CM

## 2022-05-19 PROBLEM — A08.4 VIRAL GASTROENTERITIS: Status: RESOLVED | Noted: 2021-09-13 | Resolved: 2022-05-19

## 2022-05-19 PROCEDURE — 99214 OFFICE O/P EST MOD 30 MIN: CPT | Mod: 95 | Performed by: NURSE PRACTITIONER

## 2022-05-19 RX ORDER — GABAPENTIN 300 MG/1
CAPSULE ORAL
Qty: 300 CAPSULE | Refills: 3 | OUTPATIENT
Start: 2022-05-19

## 2022-05-19 ASSESSMENT — ENCOUNTER SYMPTOMS
ABDOMINAL DISTENTION: 0
SLEEP DISTURBANCE: 0
FATIGUE: 0
NERVOUS/ANXIOUS: 0
LIGHT-HEADEDNESS: 0
WEAKNESS: 1
CONSTIPATION: 0
SHORTNESS OF BREATH: 0
VOMITING: 0
MYALGIAS: 0
COUGH: 0
RHINORRHEA: 0
CHEST TIGHTNESS: 0
ABDOMINAL PAIN: 0
HEADACHES: 0
ARTHRALGIAS: 0
SORE THROAT: 0
PALPITATIONS: 0
WHEEZING: 0
DIARRHEA: 0
DYSPHORIC MOOD: 0
NUMBNESS: 0
NAUSEA: 0
DIZZINESS: 0

## 2022-05-25 DIAGNOSIS — R55 SYNCOPE, UNSPECIFIED SYNCOPE TYPE: ICD-10-CM

## 2022-05-25 DIAGNOSIS — R29.898 BILATERAL ARM WEAKNESS: Primary | ICD-10-CM

## 2022-05-26 ENCOUNTER — VIRTUAL VISIT (OUTPATIENT)
Dept: BEHAVIORAL HEALTH | Facility: CLINIC | Age: 32
End: 2022-05-26
Attending: NURSE PRACTITIONER
Payer: COMMERCIAL

## 2022-05-26 DIAGNOSIS — F51.05 INSOMNIA DUE TO OTHER MENTAL DISORDER: ICD-10-CM

## 2022-05-26 DIAGNOSIS — F43.10 PTSD (POST-TRAUMATIC STRESS DISORDER): ICD-10-CM

## 2022-05-26 DIAGNOSIS — Z78.9 FEMALE-TO-MALE TRANSGENDER PERSON: Primary | ICD-10-CM

## 2022-05-26 DIAGNOSIS — F60.3 BORDERLINE PERSONALITY DISORDER (H): ICD-10-CM

## 2022-05-26 DIAGNOSIS — F25.0 SCHIZOAFFECTIVE DISORDER, BIPOLAR TYPE (H): Primary | ICD-10-CM

## 2022-05-26 DIAGNOSIS — F99 INSOMNIA DUE TO OTHER MENTAL DISORDER: ICD-10-CM

## 2022-05-26 DIAGNOSIS — F19.10 POLYSUBSTANCE ABUSE (H): ICD-10-CM

## 2022-05-26 PROCEDURE — 99214 OFFICE O/P EST MOD 30 MIN: CPT | Mod: 95 | Performed by: NURSE PRACTITIONER

## 2022-05-26 ASSESSMENT — ANXIETY QUESTIONNAIRES
1. FEELING NERVOUS, ANXIOUS, OR ON EDGE: NOT AT ALL
2. NOT BEING ABLE TO STOP OR CONTROL WORRYING: NOT AT ALL
4. TROUBLE RELAXING: NOT AT ALL
8. IF YOU CHECKED OFF ANY PROBLEMS, HOW DIFFICULT HAVE THESE MADE IT FOR YOU TO DO YOUR WORK, TAKE CARE OF THINGS AT HOME, OR GET ALONG WITH OTHER PEOPLE?: NOT DIFFICULT AT ALL
3. WORRYING TOO MUCH ABOUT DIFFERENT THINGS: NOT AT ALL
GAD7 TOTAL SCORE: 0
7. FEELING AFRAID AS IF SOMETHING AWFUL MIGHT HAPPEN: NOT AT ALL
7. FEELING AFRAID AS IF SOMETHING AWFUL MIGHT HAPPEN: NOT AT ALL
6. BECOMING EASILY ANNOYED OR IRRITABLE: NOT AT ALL
5. BEING SO RESTLESS THAT IT IS HARD TO SIT STILL: NOT AT ALL

## 2022-05-26 NOTE — PROGRESS NOTES
"[facility]      Mental Health & Addiction Service Line    Transition Clinic: Psychiatry Progress Note  Medication Management                Charts/documentation read prior to the appointment:  -2022    -2022            VISIT INFORMATION      Date:  May 26, 2022       Number:  -2nd      Referral source:  -Long term outpatient psychiatry      Patient Identifying Information:  Legal name: Ayana Prasad  Preferred name: Prakash  : 1990  Preferred pronouns:       Participants:   -Patient  -Provider        Telehealth visit details:  Patient location:   At home  Provider Location: [facility]  Platform utilized: Doximity TE    Start time: 3:44 pm  End time:  4:06 pm      INTERIM HX      -Went to the ED on 2022 but didn't get eval'd per a MDD or OFELIA    -See 2022 PCP encounter for summarization of fainting spell ...  plans on getting lab work done tommorrow    -Haven't used any drugs for the last 5 weeks ... I was sober, avoiding all   recreational and illicit substances during and since the 2022 T.C. appointment        PSYCHIATRIC ROS      Sleep:  -Going to sleep faster and sleeping longer amounts of time (> 3 to 5 hours)  since starting the Zyprexa        Trauma and or PTSD:  -Hx of trauma    -Did not discuss in detail during the appointment        Mood/Anxiety:    -See PHQ-9 score    -See AVA-7 score    -Discontinued Lexapro 10 mg on own        Bella/Hypomania:    -See  MyChart message and  encounter    -During today's appointment Prakash specifically states: \"I don't think the impulsive behaviors and stealing are from the Zyprexa\"      Psychosis:  +AH    -Denies a decrease in frequency or intensity in AH since going on the Zyprexa      Suicidal ideations:  -Denies passive or active thoughts at this time        SIB:  -Denies engaging in self harm in the interim hx        Side effects:  -None reported         PSYCHIATRIC " "HISTORY    Pertinent Background:  Pt initially seen on 9/2013 at this clinic with residents. Initial DA notes indicated that depression and anxiety started after \"all drugs\" in 2010. AH started around college. Multiple psychiatric hospitalizations starting 2013, last admission 2019.  Last [facility] 2019. 3 suicide attempts (accidental overdose, carbon monoxide poisoning).      Notes DOC as cannabis, but also used methamphetamine and opioid.  Never had substance use with injection. Hx of substance use treatment program. Also was in [facility] program and completed, but recently in 2021 spring, was not accepted at first psychosis or [facility] program.      Psych critical item history includes:  -3 suicide attempts, last 2019, trauma hx, multiple medication trials, multiple hospitalizations (estimates +25, first admitted in 2011 for overdose, last 2019 for haven and depression), substance use, substance treatment (2015 and 2017). Committed x 2 (2017 and 2019). Previous provider note indicated violent behavior, alcohol use and heroin use.       Medication Trials:    SNRIs  -Cymbalta 20 to 30 mg     SSRIs  -Prozac 20 mg: ineffective      Other antidepressants:  -Remeron 7.5 mg: unsure of effectiveness     Other anxiolytics:  -Hydroxyzine HCL and catalina 25 to 50 mg: ineffective, worsened urinary retention  -Buspar      Beta Blocker:  -Propranolol 10 to 20mg: effective, poorly tolerated- may have dropped BP     Mood stabilizers:  -Lamotrigine     Antipsychotics:   -Abilify 10 to 15 mg   -Geodon 80 mg: limited efficacy  -Haldol: allergy, agitating  -Latuda 40 mg   -Prolixin  -Risperdal 0.25 -1 mg: allergy  -Seroquel: QT prolongation  -Zyprexa 5-10 mg: effective     Alpha receptors:  -Intuniv and Tenex 1mg (both effective, severe dry mouth with guanfacine)  -Prazosin     Stimulants:  -Adderall XR 10 to 20 mg  -Concerta 18 mg: sedation  -Vyvanse 20 mg: effective, \"better than Adderall\"  -Strattera 60 to 80 mg: " effective     Benzodiazepines:  -Halcion  -Lorazepam 0.5 mg: effective   -Stelazine 2-6 mg: effective  -Xanax 0.5 mg: sedation     Sleep aides:  -Ambien: amnesia, memory impairment   -Belsomra: discontinued on own  -Lunesta 2-3 mg   -Melatonin 10 mg: ineffective  -Trazodone  mg: ineffective            CURRENT SUBSTANCE USE    Alcohol:  -None reported      Recreational Drugs:  -None reported     Medical Marijuana:  -None reported     Cigarettes per day:  -Use nicotine pouches      Chewing tobacco:  -None reported     Vaping:  -None reported     Caffeine intake:  -None reported             MEDICAL HISTORY    -The problem list was reviewed via the EMR prior to the appointment    -The patient denies any concerning physical and or medical symptoms during the interviewing process          MEDICATIONS      Current Outpatient Medications:      escitalopram (LEXAPRO) 10 MG tablet, Take 1 tablet (10 mg) by mouth daily, Disp: 90 tablet, Rfl: 0     lithium ER (LITHOBID) 300 MG CR tablet, Take 3 tablets (900 mg) by mouth At Bedtime, Disp: 90 tablet, Rfl: 1     OLANZapine (ZYPREXA) 20 MG tablet, Take 1 tablet (20 mg) by mouth At Bedtime, Disp: 30 tablet, Rfl: 1     ondansetron (ZOFRAN ODT) 4 MG ODT tab, Take 1 tablet (4 mg) by mouth every 6 hours as needed for nausea or vomiting (Patient not taking: No sig reported), Disp: 15 tablet, Rfl: 0     QUEtiapine (SEROQUEL) 50 MG tablet, Take 1 tablet (50 mg) by mouth At Bedtime, Disp: 30 tablet, Rfl: 2      If a controlled substance is prescribed during today's appointment:    -The [place] Prescription Monitoring Program has been reviewed and there are no current concerns with: diversionary activity, early refill requests, and or obtaining the medication from multiple providers.        VITALS    BP Readings from Last 3 Encounters:   05/05/22 (!) 140/89   05/04/22 (!) 160/99   01/16/22 (!) 161/114       Pulse Readings from Last 3 Encounters:   05/05/22 105   05/04/22 116    01/16/22 94       Wt Readings from Last 3 Encounters:   05/05/22 104.3 kg (230 lb)   05/04/22 104.3 kg (230 lb)   01/16/22 104.3 kg (230 lb)         LABS    The following labs were reviewed prior to or during the appointment:  -None        SCALES    PHQ 3/2/2022 3/21/2022 5/5/2022   PHQ-9 Total Score 0 3 3   Q9: Thoughts of better off dead/self-harm past 2 weeks Not at all Not at all Not at all        AVA-7 SCORE 7/16/2021 3/21/2022 5/5/2022   Total Score - - -   Total Score - 15 (severe anxiety) -   Total Score 14 15 4       Answers for HPI/ROS submitted by the patient on 5/26/2022  If you checked off any problems, how difficult have these problems made it for you to do your work, take care of things at home, or get along with other people?: Not difficult at all  PHQ9 TOTAL SCORE: 0  AVA 7 TOTAL SCORE: 0        MENTAL STATUS EXAMINATION    Appearance: DIANNA  General Behavior:  Ambivalent   Speech: Monotone  Musculoskeletal:  DIANNA  Mood: Fine  Affect: DIANNA  Attention: Intact  Orientation:  Person, Place, Time, Situation  Thought Associations:  Intact  Thought Content: +AH   Thought Processes: Organized, Normal rate  Memory: No compliant of impairment  Language: Intact  Judgement: Fair to poor  Insight:  Fair to poor         ASSESSMENT/CLINICAL IMPRESSIONS    Summary:    Santi Prasad is a transgender person (female to male) with a history of:  ADHD, BPD, Schizoaffective Bipolar Type, PTSD, and Polysubstance abuse.     Currently psychotropics are managed by  DEVIN HUI who is on a KIM during May/2022, therefore initiated care on 5/5/2022 at the [facility] for bridging services.    ------------------    It is noteworthy there were ongoing IT issues therefore had to convert to a TE and unable to view patient via video.    Prakash engages with writer minimally and often provides limited short replies to questions/conversation throughout the interview.    He now retracts the previous report of  polysubstance use on 5/5/2022 and comments being totally sober for the past 5 weeks.        Additionally, Prakash is no longer concerned Zyprexa (writer outlined it could be discontinued)  is contributing to impulsive behaviors, or causing him to do harmful things like steal or trespass, as summarized per 5/12/2022 Saved[employer] message and to PCP on 5/19/2022.       Prakash does state he wants to remain on current medication regimen, including Zyprexa since it is helping him fall asleep, although it is not reduced AH.    Moving forward would consider BPD as the primary dx as mood lability seems more consistent with personality disorder traits/behaviors or drug use (although no recent WEEKS has been obtained) versus from haven or hypomania episodes.    Prakash denies any immediate safety concerns towards himself or others and is forward thinking about going to his parents house over Memorial Day weekend.        DSM-V and or working diagnosis:    1. Schizoaffective disorder, bipolar type (H)     2. Borderline Personality Disorder     3. Hx of: Polysubstance abuse     4. PTSD     5. Insomnia due to other mental disorder        Rule outs:     6. Substance induced psychosis versus above dx of Schizoaffective Bipolar               SAFETY EVALUATION:     Suicidal ideations:  -Denies  Homicidal ideations:  -Denies  Protective and mitigating factors:  -Case management  -Involved in outpatient mental health services  Risk factors:  -OFFOCNT8t+ demographic  -Prior attempts  -Hx of impulsive, erractic behaviors   Risk assessment:  -Low to medium      TREATMENT PLAN      Medications:  Continue:  -Lithium/Lithobid  mg (3 tabs) at bedtime    -Olanzapine/Zyprexa 20 mg at bedtime     -Quetiapine/Seroquel 50 mg at bedtime     -No increase > 50 mg  (QTc = 440 on 3/17/2022,  459 on 5/5/2022 )         Labs:  -States he will be getting labs ordered by PCP on 5/19/2022 done on 5/27/2022  -Reminded to obtain Li level 12 to 14 hours after last  dosage          Therapy:  -As needed (either individual or group)        Non-pharmacological modalities:  -Did not discuss        Return to Clinic or Referrals:  -You do not need to return to the Transition Clinic for medication management  -Please make sure to keep the appointment on: 6/17/2022   for longitudinal outpatient psychiatry services      Total time:  33 minutes per:    -Review of EMR  -Appointment time   -Documentation          Becki BARAHONA-CNP, [facility]    --------------------------------------------------------------------------------------------------------------------------        TREATMENT RISK STATEMENT    The risks, benefits, alternatives, and potential adverse effects have been explained and are understood by the patient.  The patient agrees to the treatment plan with their ability to do so.      The patient knows to call the clinic: [phone]  for any problems or concerns until the next psychiatry visit, regardless if it is within or outside of the [facility] system.     If unable to reach clinic staff (via phone call or medical messaging) during the normal business hours: 8:00 am to 4:30 pm then it is recommended accessing the nearest: emergency department, urgent care facility, or utilizing local (varies based on county of residence) and national crisis #'s or text messaging services for immediate assistance.          --------------------------------------------------------------------------------------------------------------------------        If applicable the following has been discussed with the patient, parent/guardian, and or attending family member during the appointment:      1. Risks of polypharmacy and possible drug interactions with current medication list + common OTC products, herbs, and supplements.    Moving forward, it is suggested to intermittently check-in with a clinic or retail pharmacist whenever new medications or OTC/h/s are consumed.    2. Recommendation  to adhere to CDC guidelines as it relates alcohol consumption.  If taking benzodiazepines, you should abstain from alcohol intake due to increased risks of CNS and respiratory depression, as well as psychomotor impairment.    3. If possible, it is recommended to avoid concurrent use of prescribed:  opioids  +  benzodiazepines due to increased risks of CNS and respiratory depression, as well as the increased risk of overdose.     4. Recommendation to minimize and or abstain from THC use (unless the pt. is prescribed medical marijuana).    5. Recommendation to abstain from illicit substances including but not limited to the following: heroin, street fentanyl, cocaine, methamphetamines, and bath salts.    6. Do not take opioids, stimulants, and or other prescription medications unless they are specifically prescribed for you.    7. Recommendation to abstain from tobacco/smoking, alcohol, THC, and all illicit substances if trying to become or are pregnant.    8. Black Box Warnings associated with the prescribed psychotropic(s).    9. Potential adverse effects of antipsychotics including but not limited to the following: weight gain, metabolic syndrome, EPS/Tardive Dyskinesias.    10. Potential CV and neurological adverse effects of stimulants including but not limited to the following:  sudden death, MI, stroke, HTN, cardiomyopathy (long term use) as well as seizures.

## 2022-05-26 NOTE — Clinical Note
Regine and Becki-  Just ARTHUR ...  Given Prakash's gross change in report of drug use as well as response to the Zyprexa, it seems like there is an attempt on his end of provider splitting.  Becki, have a good upcoming Memorial Day weekend.  roxy Weiner travels back to the [place]  Sincerely,  Becki Sommers

## 2022-05-26 NOTE — PATIENT INSTRUCTIONS
Continue:  -Lithium/Lithobid  mg (3 tabs) at bedtime    -Olanzapine/Zyprexa 20 mg at bedtime     -Quetiapine/Seroquel 50 mg at bedtime    ------------------------    -You do not need to return to the [facility] Clinic for medication management  -Please make sure to keep the appointment on: 6/17/2022   for longitudinal outpatient psychiatry services

## 2022-05-26 NOTE — PROGRESS NOTES
" This video/telephone visit will be conducted virtually between you and your provider. We have found that certain health care needs can be provided without the need for an in-person physical exam. This service lets us provide the care you need with a video /telephone conversation. If a prescription is necessary we can send it directly to your pharmacy. If lab work is needed we can place an order for that and you can then stop by our lab to have the test done at a later time.\"   Just as we bill insurance for in-person visits, we also bill insurance for video/telephone visits. If you have questions about your insurance coverage, we recommend that you speak with your insurance company.      Patient/Parent has given verbal consent for video/Telephone visit? yes    Patient would like the video visit invitation sent by: [employer]  Patient verified allergies, medications and pharmacy via phone. Patient states they are ready for visit.     Mental Health &Addiction (MH&A)Transition Clinic (TC):     Provides Patient Support While Waiting to Access Programmatic and Outpatient MH&A Care and Provides [facility] Crisis Assessment Services     FOLLOW-UP VISIT  ____________________________________________      \"The Transition Clinic is a temporary psychiatry service that helps to bridge the time to your next appointment. It is not intended to be a long-term service and you are expected to attend your scheduled appointment with your next provider.\"  [x] Patient/Parent verbalized understanding    If you need support between appointments, please call [phone] and let them know you're seen by [facility] Psychiatry. You may also send a Eventful message to reach us.    General-     Most pressing needs at this time: just a folow up hiren     Mental Health currently:  : \"good\"     Any changes to your physical health: not reported         Medications-     Injectable medications currently prescribed: No    If yes, do you need an " appointment for the next injection: NA     Any Controlled Substances that you are prescribed: None     Any refills you are aware that you'll need soon: none at this time        Primary care provider: BROOKLYN Cruz CNP        AVA-7 scores:  0  AVA-7 SCORE 3/21/2022 5/5/2022   Total Score - -   Total Score 15 (severe anxiety) -   Total Score 15 4       PHQ-9 scores: 0  PHQ-9 SCORE 3/21/2022 5/5/2022   PHQ-9 Total Score - -   PHQ-9 Total Score MyChart 3 (Minimal depression) -   PHQ-9 Total Score 3 3         Anything the provider should be aware of for today's appointment:   His second appt w/ORIN Sommers  Transgender Male / Female-to-Male    New (awaiting) Mental health provider or next programming: Carlos      Date of scheduled apt: 6/17/22      Louisa Jacinto on May 26, 2022 at 1:43 PM

## 2022-05-27 ENCOUNTER — LAB (OUTPATIENT)
Dept: LAB | Facility: CLINIC | Age: 32
End: 2022-05-27
Payer: COMMERCIAL

## 2022-05-27 DIAGNOSIS — R55 FAINTING SPELL: ICD-10-CM

## 2022-05-27 LAB
ANION GAP SERPL CALCULATED.3IONS-SCNC: 9 MMOL/L (ref 3–14)
BASOPHILS # BLD AUTO: 0.1 10E3/UL (ref 0–0.2)
BASOPHILS NFR BLD AUTO: 1 %
BUN SERPL-MCNC: 12 MG/DL (ref 7–30)
CALCIUM SERPL-MCNC: 9 MG/DL (ref 8.5–10.1)
CHLORIDE BLD-SCNC: 108 MMOL/L (ref 94–109)
CO2 SERPL-SCNC: 23 MMOL/L (ref 20–32)
CREAT SERPL-MCNC: 0.59 MG/DL (ref 0.52–1.25)
EOSINOPHIL # BLD AUTO: 0.2 10E3/UL (ref 0–0.7)
EOSINOPHIL NFR BLD AUTO: 2 %
ERYTHROCYTE [DISTWIDTH] IN BLOOD BY AUTOMATED COUNT: 15.2 % (ref 10–15)
GFR SERPL CREATININE-BSD FRML MDRD: >90 ML/MIN/1.73M2
GLUCOSE BLD-MCNC: 106 MG/DL (ref 70–99)
HCT VFR BLD AUTO: 43.6 % (ref 35–53)
HGB BLD-MCNC: 14.6 G/DL (ref 11.7–17.7)
IMM GRANULOCYTES # BLD: 0.1 10E3/UL
IMM GRANULOCYTES NFR BLD: 1 %
LITHIUM SERPL-SCNC: 0.6 MMOL/L
LYMPHOCYTES # BLD AUTO: 2.8 10E3/UL (ref 0.8–5.3)
LYMPHOCYTES NFR BLD AUTO: 28 %
MCH RBC QN AUTO: 28.9 PG (ref 26.5–33)
MCHC RBC AUTO-ENTMCNC: 33.5 G/DL (ref 31.5–36.5)
MCV RBC AUTO: 86 FL (ref 78–100)
MONOCYTES # BLD AUTO: 0.6 10E3/UL (ref 0–1.3)
MONOCYTES NFR BLD AUTO: 6 %
NEUTROPHILS # BLD AUTO: 6.1 10E3/UL (ref 1.6–8.3)
NEUTROPHILS NFR BLD AUTO: 62 %
NRBC # BLD AUTO: 0 10E3/UL
NRBC BLD AUTO-RTO: 0 /100
PLATELET # BLD AUTO: 306 10E3/UL (ref 150–450)
POTASSIUM BLD-SCNC: 3.9 MMOL/L (ref 3.4–5.3)
RBC # BLD AUTO: 5.06 10E6/UL (ref 3.8–5.9)
SODIUM SERPL-SCNC: 140 MMOL/L (ref 133–144)
TSH SERPL DL<=0.005 MIU/L-ACNC: 2.02 MU/L (ref 0.4–4)
WBC # BLD AUTO: 9.9 10E3/UL (ref 4–11)

## 2022-05-27 PROCEDURE — 80048 BASIC METABOLIC PNL TOTAL CA: CPT

## 2022-05-27 PROCEDURE — 86803 HEPATITIS C AB TEST: CPT

## 2022-05-27 PROCEDURE — 84443 ASSAY THYROID STIM HORMONE: CPT

## 2022-05-27 PROCEDURE — 85025 COMPLETE CBC W/AUTO DIFF WBC: CPT

## 2022-05-27 PROCEDURE — 36415 COLL VENOUS BLD VENIPUNCTURE: CPT

## 2022-05-27 PROCEDURE — 80178 ASSAY OF LITHIUM: CPT

## 2022-05-27 NOTE — PROGRESS NOTES
MH&A TC   NURSING Post-Appointment Chart-check:    Correct pharmacy verified with patient and updated in chart? [] yes []no    Charge captured ? [x] yes  [] no    Medications ordered this visit were e-scribed.  Verified by order class [] yes  [x] no    List Medications:    Medication changes or discontinuations were communicated to patient's pharmacy: [] yes  [x] no    UA collected [] yes  [] no  [x] n/a-virtual     Future appointment was made: [] yes  [] no  [x] n/a    Dictation completed at time of chart check: [x] yes  [] no    I have checked the documentation for today s encounters and the above information has been reviewed and completed.      Adiel Farfan RN on May 27, 2022 at 2:59 PM

## 2022-05-30 LAB — HCV AB SERPL QL IA: NONREACTIVE

## 2022-05-31 ENCOUNTER — MYC MEDICAL ADVICE (OUTPATIENT)
Dept: DERMATOLOGY | Facility: CLINIC | Age: 32
End: 2022-05-31
Payer: COMMERCIAL

## 2022-06-01 ENCOUNTER — TELEPHONE (OUTPATIENT)
Dept: PLASTIC SURGERY | Facility: CLINIC | Age: 32
End: 2022-06-01
Payer: COMMERCIAL

## 2022-06-01 NOTE — TELEPHONE ENCOUNTER
ROSE and sent [facility] for plastic surg referral. Pt can schedule UMP RETURN with Dr. Mayers, next available.

## 2022-06-06 ENCOUNTER — VIRTUAL VISIT (OUTPATIENT)
Dept: DERMATOLOGY | Facility: CLINIC | Age: 32
End: 2022-06-06

## 2022-06-06 ENCOUNTER — VIRTUAL VISIT (OUTPATIENT)
Dept: FAMILY MEDICINE | Facility: CLINIC | Age: 32
End: 2022-06-06
Payer: COMMERCIAL

## 2022-06-06 DIAGNOSIS — F19.951 SUBSTANCE-INDUCED PSYCHOTIC DISORDER WITH HALLUCINATIONS (H): ICD-10-CM

## 2022-06-06 DIAGNOSIS — F31.11 BIPOLAR 1 DISORDER, MANIC, MILD (H): Chronic | ICD-10-CM

## 2022-06-06 DIAGNOSIS — L81.0 POST-INFLAMMATORY HYPERPIGMENTATION: ICD-10-CM

## 2022-06-06 DIAGNOSIS — L70.0 ACNE, NODULAR: Primary | ICD-10-CM

## 2022-06-06 DIAGNOSIS — Z79.899 ON ISOTRETINOIN THERAPY: ICD-10-CM

## 2022-06-06 DIAGNOSIS — F64.9 GENDER DYSPHORIA: Primary | ICD-10-CM

## 2022-06-06 PROCEDURE — 99203 OFFICE O/P NEW LOW 30 MIN: CPT | Mod: 95 | Performed by: DERMATOLOGY

## 2022-06-06 PROCEDURE — 99214 OFFICE O/P EST MOD 30 MIN: CPT | Mod: 95 | Performed by: STUDENT IN AN ORGANIZED HEALTH CARE EDUCATION/TRAINING PROGRAM

## 2022-06-06 NOTE — PROGRESS NOTES
Prakash is a 31 year old who is being evaluated via a billable video visit.      How would you like to obtain your AVS? MyChart  If the video visit is dropped, the invitation should be resent by: Text to cell phone: in chart  Will anyone else be joining your video visit? No      Video Start Time: 2:45 PM    Assessment & Plan     Gender dysphoria  Substance-induced psychotic disorder with hallucinations (H)  Bipolar 1 disorder, manic, mild  Patient with significant mental health history of bipolar, borderline personality disorder and TBI, initiated gender affirming hormone therapy w/T 3/2021. Has auditory hallucinations (occasionally command) at baseline and recommended close monitoring of on T. Per patient, voices were no worse than baseline and he noted significant improvement in his depression symptoms while on T. He was subsequently lost to follow up with myself, his therapist and psychiatrist and further testosterone refills were ultimately declined without office visit. He then had an inpatient psychiatric hospitalization at [facility] in November through December 2021. During his hospitalization he did receive T as it was still on his active medication list despite not taking it for several months. He notes improvement in his mood during that hospitalization. Subsequently, he was off all medications from January 2022 until about 1 month ago when he re-established with his mental health team. He reports he has been sober since 5/5/2022 which is consistent with other notes. He reports being motivated to stay connected with his mental health and medical providers at this time. He would like to restart T.     I will attempt to get in contact with his therapist for collateral and ensure all parties are in support of patient restarting T. Would again plan to start low and go slow, initial dose 20 mg subQ weekly, likely goal 70-80 mg weekly. Monitor hallucinations/voices on T. Emphasized the importance of close  follow up with restarting this medication, which is complicated by the fact that I graduate in a few weeks, which I did inform him of. Encouraged scheduling a follow up appointment with new HRT provider at [facility] in 1 month.    No contraception needed, no partners with sperm. Is also starting Accutane and will be followed closely through [facility] program.    Psychiatrist: Dr. Weiner  Therapist: [email or web address]  [facility] worker    Return in about 4 weeks (around 7/4/2022).    Glenda Juan MD  [facility] LINETTE Randall is a 31 year old who presents for the following health issues    HPI     Patient with significant mental health history of bipolar, borderline personality disorder and TBI, initiated gender affirming hormone therapy w/T 3/2021. Has auditory hallucinations (occasionally command) at baseline and there were concerns raised by providers regarding increased hallucinations after initiating testosterone. All providers were new to him and, per patient, the voices were no worse than baseline. He noted feeling significant improvement in his depression symptoms while on T. He was subsequently lost to follow up with myself, his therapist and psychiatrist and further testosterone refills were declined without office visit.      Recent inpatient psychiatric hospitalization at [facility] in November through December 2021.  During his hospitalization he did have some changes made to his psychiatric medications.  Did recently see his psychiatrist and there were notes about concerns regarding ongoing polysubstance use.  more recent notes state he has been off BPD meds x3 mo and has felt manic sx.     On hormones?  No +++ previously on testosterone cypionate 200 mg/mL 40 mg (0.2 mL) subQ weekly. While he was in the hospital he was given T since it was on his med list.     - Becki burns PCP  - Psychiatry Dr. Weiner- got back on medications recently (Lithium, olanzapine,  "seroquel)    --> mood \"it could be better\"   --> was off medications from January until very recently    --> hallucinations same, hearing voices - always, just get quieter more manageable when on meds   - Deborah therapist, new 3 weeks ago, meets weekly - [facility]   - [facility] worker     Sober since 5/5/2022    Starting Accutane - ipledge   No partners with sperm        Objective         Vitals:  No vitals were obtained today due to virtual visit.    Physical Exam   GENERAL: Healthy, alert and no distress  RESP: No audible wheeze, cough, or visible cyanosis.  No visible retractions or increased work of breathing.    SKIN: Visible skin clear. No significant rash, abnormal pigmentation or lesions.  NEURO: Cranial nerves grossly intact.  Mentation and speech appropriate for age.  PSYCH: Mentation appears normal, affect normal/bright, judgement and insight intact, normal speech and appearance well-groomed.    No results found. However, due to the size of the patient record, not all encounters were searched. Please check Results Review for a complete set of results.          Video-Visit Details  Type of service:  Video Visit  Video End Time:2:56 PM  Originating Location (pt. Location): Home  Distant Location (provider location):  [facility]   Platform used for Video Visit: Evens  "

## 2022-06-06 NOTE — NURSING NOTE
Chief Complaint   Patient presents with     Acne     Ongoing 6-9 months. Has not seen a dermatologist for this     Teledermatology Nurse Call Patients:     Are you in the [facility] at the time of the encounter? yes    Today's visit will be billed to you and your insurance.    A teledermatology visit is not as thorough as an in-person visit and the quality of the photograph sent may not be of the same quality as that taken by the dermatology clinic.    Zahraa Pope, EDWAR

## 2022-06-06 NOTE — PATIENT INSTRUCTIONS
You have nodulocystic acne. Acne can worsen with testosterone.  Plan:  The most effective medication for nodulocystic acne is Accutane. After 2-3 months, you will most likely notice visible improvement of your skin. It will take around 9-10 months of treating on this pill. This medication will not interfere with testosterone. Some side effects include: dryness, mood changes, muscle/joint pain. If you have any side effects, please let me know as soon as possible. Please let your primary care provider know that you are taking this medication.   With this medication, we have to:  Do an in-person nurse visit: blood work, urine pregnancy test, I-Pledge  30 days after your nurse visit: home pregnancy test or lab pregnancy test. If you do a home test, you must label the test with your initials and date, and send us a picture. Any at home pregnancy test is okay to use.   After two pregnancy tests  by a month, we can start the pill medication. We will then have short monthly visits together to continue using the medications.    Isotretinoin     Isotretinoin helpful tips:  Accutane is the best known name brand for isotretinoin. It is actually no longer made under this Accutane name brand. Other name brands are available (i.e. Amnesteem, Sotret). Generic isotretinoin is available and is what generally will be prescribed for you.     Isotretinoin and side effects:   When you read through the Ipledge booklet (which you must do!), taking isotretinoin quickly starts to sound scary. Here is the breakdown:   What you will notice on an everyday basis: Dry lips.     Help for dry lips:   Vitamin E oil   Dr Mat Motta   Aquaphor healing ointment   Vaniply   Hydrocortisone ointment   If these are not helping, you can also start taking oral vitamin E (400units/day) and alpha omega fatty acids.     What you might notice:   - dry skin (mainly of face but sometimes increased hand dryness)   - redder face   - photosensitivity  (increased likelihood of sun-burning)   - If you are taking a trip and be in the sun a lot, then consider stopping your isotretinoin 2 days prior to leaving. (Ask your doctor).    -Sore muscles or joints   - If you are going to be doing heavy physical activity or a contact sport, you are at higher risk for muscle breakdown, and please discuss with your provider (i.e. football and hockey players). General exercise (shoveling snow, long runs, weightlifting) should be fine, but if you are more sore than usual after, you may need to adjust your dose.   Other side effects are more rare but important to learn about--please read the Ipledge booklet. The most important side effects are 1) birth defects and 2) risk of depression and suicide.   - If you feel different, more irritable, anxious, sad, or emotional in any way, please bring it up! That doesn't mean we are going to stop your isotretinoin, but we do want to address the issue.   - Blood monitoring is done a minimum of three times (more depending on you and your doctor).   1) before you start, for baseline levels and to make sure things with your kidneys, liver, blood counts, and cholesterol are good starting out   2) after the first month of therapy   3) after the second month of therapy     Ipledge program tips for women:   Women need to answer questions on-line every month, mainly about but not limited to the fact that this medicine causes birth defects and about the importance of not becoming pregnant while on isotretinoin.   - Women must be on 2 forms of birth control! What you put down in the ipledge program MUST match what the doctor puts down! So if you change your mind on your two forms of birth control, we need to know about it!   - Your prescription must be picked up from the pharmacy within 7 days from the date it was written and your pregnancy test was done or you will need to have another pregnancy test done.   If you forget your number or password,  please call the Ipledge program: [phone]     Appointments:   You have to come in every month to see the doctor/NP/PA.   Why? 1) Ensure you are not pregnant (for women)   Blood testing and side effect monitoring   Mood evaluation   Because it is mandated by the FDA   Please make all of your appointments in advance--as close to 30 days as possible. You will be on isotretinoin for 4-6 months. A one month post-course final pregnancy test (turn out negative, please!) is also required. If scheduling requires, you may see a different provider, but be sure the  understands you need to see someone who works with isotretinoin patients.   Thank you! This is a very good medicine and we think worth the hassle. We enjoy working through this course with you and seeing your acne (and everything that goes with it) improve!

## 2022-06-06 NOTE — LETTER
6/6/2022       RE: Ayana Prasad  [address] [address]     Dear Colleague,    Thank you for referring your patient, Ayana Prasad, to the [facility] [place] at [facility]. Please see a copy of my visit note below.    [facility] Dermatology Note  Encounter Date: Jun 6, 2022  Store-and-Forward and Telephone ([phone]). Location of teledermatologist: [facility] [place].  Start time: 1:15 PM. End time: 1:41PM.    Dermatology Problem List:  1. Severe nodulocystic acne  - correlated to testosterone therapy, patient IS of child-bearing potential  - planning to start isotretinoin pending nurse visit  - cumulative dose: 0 mg  - goal dose: 220 mg/kg x 100 kg = 61996 mg    ____________________________________________    Assessment & Plan:     1.Severe acne, nodular with scarring, hyperpigmentation  * active  Correlated to testosterone replacment, which he plans to restart and increase the dosage. We discussed the risks and benefits of trialing topical medications vs isotretinoin. Recommended starting isotretinoin as acne will likely progress with increased dosage of testosterone. Patient opted for oral isotretinoin. Risks and benefits or oral isotretinoin reviewed: skin and lip dryness, muscle aches, joint pains, mood changes, depression. Discussed next steps of initiating treatment. He does have child-bearing potential. Will schedule for nurse visit to start iPledge and baseline labs.   - labs: CBC, CMP, lipids, hCG; repeat UPT 30 days later  - future start: isotretinoin 40 mg PO daily with goal cumulative dose of approximately 97474 mg    Procedures Performed:    None    Follow-up: 2 months for accutane    Staff and Medical Student:     Heather BLACK, MS4 saw and staffed the patient with Mark Cleary MD.  [facility] [employer]    Staff Physician  Comments:   I evaluated any available patient photographs with the medical student and I edited the assessment and plan as documented in the note. I was present on the line and participated in the entire telephone call.    Mark Cleary MD   of Dermatology  Department of Dermatology  [facility] [employer]        ____________________________________________    CC: Acne (Ongoing 6-9 months. Has not seen a dermatologist for this)    HPI:  Mr. Prakash Prasad is a(n) 31 year old man who presents today as a new patient for acne. Past medical history significant for psychiatric illness and substance abuse.    Acne:  - duration: April 2021  - lesions: painful bump, no pus  - location: face, neck, chest, back, arms  - triggers: correlated with testosterone (started March 2022, stopped 12/2021). He would like to restart testosterone and increase his dosage.  - current tx: Cetaphil wash  - previous prescriptions: no  - previous isotretinoin: no  - family history of acne: no  - ROS: no fever  - goal: clear acne     Patient is otherwise feeling well, without additional skin concerns.    Labs Reviewed:  N/A    Physical Exam:  Vitals: There were no vitals taken for this visit.  SKIN: Teledermatology photos were reviewed; image quality and interpretability: acceptable. Image date: 5/31/2022.  - Inflammatory papules and nodules with intermixed open and closed comedones, hyperpigmented macules, and scarring on the forehead, cheeks, chin, neck, antecubital fossa, and right extensor arm.  - No other lesions of concern on areas examined.     Medications:  Current Outpatient Medications   Medication     lithium ER (LITHOBID) 300 MG CR tablet     OLANZapine (ZYPREXA) 20 MG tablet     QUEtiapine (SEROQUEL) 50 MG tablet     No current facility-administered medications for this visit.      Past Medical/Surgical History:   Patient Active Problem List   Diagnosis     ADHD (attention deficit  hyperactivity disorder)     Bipolar 1 disorder, manic, mild     Marijuana abuse     Polysubstance abuse (H)     GERD (gastroesophageal reflux disease)     Tobacco abuse     Intractable back pain     Optic neuritis     Cauda equina syndrome with neurogenic bladder (H)     Schizoaffective disorder, bipolar type (H)     PTSD (post-traumatic stress disorder)     Anxiety     Auditory hallucination     Class 2 obesity due to excess calories in adult     Nephrolithiasis     Cyst of left ovary     Borderline personality disorder (H)     Cannabis dependence (H)     Depression     Episodic mood disorder (H)     History of heroin abuse (H)     Moderate episode of recurrent major depressive disorder (H)     Opioid use disorder, severe, dependence (H)     Substance-induced psychotic disorder with hallucinations (H)     Nausea     Overdose     Bella (H)     Spell of altered consciousness     Urinary retention     Obesity (BMI 35.0-39.9) with comorbidity (H)     Chronic bilateral low back pain without sciatica     AVA (generalized anxiety disorder)     Aggressive behavior     Gender identity disorder     Lumbosacral radiculopathy at L5     DUB (dysfunctional uterine bleeding)     Seizure-like activity (H)     Acanthosis nigricans     Prediabetes     Schizoaffective disorder, chronic condition with acute exacerbation (H)     Bipolar affective disorder, mixed, severe, with psychotic behavior (H)     Schizophrenia, schizoaffective, chronic with acute exacerbation (H)     Akathisia     Hypertension, unspecified type     Female-to-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 person     Past Medical History:   Diagnosis Date     ADHD (attention deficit hyperactivity disorder)      Bipolar 1 disorder      Borderline personality disorder      Cauda equina syndrome      Chronic low back pain      Depression      GERD (gastroesophageal reflux disease)      h/o TBI (traumatic brain injury)      Hypertension, unspecified type 12/16/2021     Marginal corneal ulcer, left  07/17/2015     Nephrolithiasis      obesity      Polysubstance abuse - methamphetamine, hallucinagen, heroin, marijuana     currently in remission     PONV (postoperative nausea and vomiting)      PTSD (post-traumatic stress disorder)        CC Referred Self, MD  No address on file on close of this encounter.

## 2022-06-06 NOTE — PROGRESS NOTES
[facility] Dermatology Note  Encounter Date: Jun 6, 2022  Store-and-Forward and Telephone ([phone]). Location of teledermatologist: [facility] [place].  Start time: 1:15 PM. End time: 1:41PM.    Dermatology Problem List:  1. Severe nodulocystic acne  - correlated to testosterone therapy, patient IS of child-bearing potential  - planning to start isotretinoin pending nurse visit  - cumulative dose: 0 mg  - goal dose: 220 mg/kg x 100 kg = 52466 mg    ____________________________________________    Assessment & Plan:     1.Severe acne, nodular with scarring, hyperpigmentation  * active  Correlated to testosterone replacment, which he plans to restart and increase the dosage. We discussed the risks and benefits of trialing topical medications vs isotretinoin. Recommended starting isotretinoin as acne will likely progress with increased dosage of testosterone. Patient opted for oral isotretinoin. Risks and benefits or oral isotretinoin reviewed: skin and lip dryness, muscle aches, joint pains, mood changes, depression. Discussed next steps of initiating treatment. He does have child-bearing potential. Will schedule for nurse visit to start iPledge and baseline labs.   - labs: CBC, CMP, lipids, hCG; repeat UPT 30 days later  - future start: isotretinoin 40 mg PO daily with goal cumulative dose of approximately 30341 mg    Procedures Performed:    None    Follow-up: 2 months for accutane    Staff and Medical Student:     Heather BLACK, MS4 saw and staffed the patient with Mark Cleary MD.  [facility] [employer]    Staff Physician Comments:   I evaluated any available patient photographs with the medical student and I edited the assessment and plan as documented in the note. I was present on the line and participated in the entire telephone call.    Mark Cleary MD   of Dermatology  Department of Dermatology  [place]  [facility] [employer]        ____________________________________________    CC: Acne (Ongoing 6-9 months. Has not seen a dermatologist for this)    HPI:  Mr. Prakash Prasad is a(n) 31 year old man who presents today as a new patient for acne. Past medical history significant for psychiatric illness and substance abuse.    Acne:  - duration: April 2021  - lesions: painful bump, no pus  - location: face, neck, chest, back, arms  - triggers: correlated with testosterone (started March 2022, stopped 12/2021). He would like to restart testosterone and increase his dosage.  - current tx: Cetaphil wash  - previous prescriptions: no  - previous isotretinoin: no  - family history of acne: no  - ROS: no fever  - goal: clear acne     Patient is otherwise feeling well, without additional skin concerns.    Labs Reviewed:  N/A    Physical Exam:  Vitals: There were no vitals taken for this visit.  SKIN: Teledermatology photos were reviewed; image quality and interpretability: acceptable. Image date: 5/31/2022.  - Inflammatory papules and nodules with intermixed open and closed comedones, hyperpigmented macules, and scarring on the forehead, cheeks, chin, neck, antecubital fossa, and right extensor arm.  - No other lesions of concern on areas examined.     Medications:  Current Outpatient Medications   Medication     lithium ER (LITHOBID) 300 MG CR tablet     OLANZapine (ZYPREXA) 20 MG tablet     QUEtiapine (SEROQUEL) 50 MG tablet     No current facility-administered medications for this visit.      Past Medical/Surgical History:   Patient Active Problem List   Diagnosis     ADHD (attention deficit hyperactivity disorder)     Bipolar 1 disorder, manic, mild     Marijuana abuse     Polysubstance abuse (H)     GERD (gastroesophageal reflux disease)     Tobacco abuse     Intractable back pain     Optic neuritis     Cauda equina syndrome with neurogenic bladder (H)     Schizoaffective disorder, bipolar type (H)     PTSD  (post-traumatic stress disorder)     Anxiety     Auditory hallucination     Class 2 obesity due to excess calories in adult     Nephrolithiasis     Cyst of left ovary     Borderline personality disorder (H)     Cannabis dependence (H)     Depression     Episodic mood disorder (H)     History of heroin abuse (H)     Moderate episode of recurrent major depressive disorder (H)     Opioid use disorder, severe, dependence (H)     Substance-induced psychotic disorder with hallucinations (H)     Nausea     Overdose     Bella (H)     Spell of altered consciousness     Urinary retention     Obesity (BMI 35.0-39.9) with comorbidity (H)     Chronic bilateral low back pain without sciatica     AVA (generalized anxiety disorder)     Aggressive behavior     Gender identity disorder     Lumbosacral radiculopathy at L5     DUB (dysfunctional uterine bleeding)     Seizure-like activity (H)     Acanthosis nigricans     Prediabetes     Schizoaffective disorder, chronic condition with acute exacerbation (H)     Bipolar affective disorder, mixed, severe, with psychotic behavior (H)     Schizophrenia, schizoaffective, chronic with acute exacerbation (H)     Akathisia     Hypertension, unspecified type     Female-to-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 person     Past Medical History:   Diagnosis Date     ADHD (attention deficit hyperactivity disorder)      Bipolar 1 disorder      Borderline personality disorder      Cauda equina syndrome      Chronic low back pain      Depression      GERD (gastroesophageal reflux disease)      h/o TBI (traumatic brain injury)      Hypertension, unspecified type 12/16/2021     Marginal corneal ulcer, left 07/17/2015     Nephrolithiasis      obesity      Polysubstance abuse - methamphetamine, hallucinagen, heroin, marijuana     currently in remission     PONV (postoperative nausea and vomiting)      PTSD (post-traumatic stress disorder)        CC Referred Jose, MD  No address on file on close of this encounter.

## 2022-06-08 RX ORDER — TESTOSTERONE CYPIONATE 200 MG/ML
20 INJECTION, SOLUTION INTRAMUSCULAR WEEKLY
Qty: 5 ML | Refills: 0 | Status: SHIPPED | OUTPATIENT
Start: 2022-06-08 | End: 2023-01-24

## 2022-06-09 ENCOUNTER — MYC MEDICAL ADVICE (OUTPATIENT)
Dept: FAMILY MEDICINE | Facility: CLINIC | Age: 32
End: 2022-06-09
Payer: COMMERCIAL

## 2022-06-09 DIAGNOSIS — Z72.0 NICOTINE ABUSE: Primary | ICD-10-CM

## 2022-06-09 NOTE — TELEPHONE ENCOUNTER
Gave 2nd call and spoke with patient about referral. Patient had some questions about if a mammogram is needed before following up in clinic. Will speak to plastics team and call patient back.

## 2022-06-09 NOTE — PLAN OF CARE
Assessment/Intervention, Care Coordination and Current Symptoms:    Writer consulted with MD and with  Domenica. It was agreed upon that patient can discharge back to the group home tomorrow, 12/10, at noon. Writer communicated with Domenica that it is essential that Prakash does not have access to his medications at any time. Domenica understood the importance of this and was in agreement with this. Due to patient being on a restricted recipient program, and being restricted to a specific provider, polyr called Ayana at [facility] ([phone]) and requested the restriction on provider be lifted so that the attending provider here can send the prescriptions to [place]. Ayana confirmed that this will be taken care of by tomorrow morning. Patient's meds should all be sent to [place], where Domenica has confirmed that the group home staff will pick them up to prevent Prakash from picking them up himself. Writer to schedule a [facility] cab ride for patient for tomorrow at noon going directly to the group home.    Writer checked in with patient. Together made phone calls to set up the following outpatient appointments:  Psychiatry Appointment  Date/Time: December 22nd 2pm  Provider: BROOKLYN Calvin, CNP  Therapy Appointment  Date/Time: Friday, December 17th at 8:30am  Provider: Joana [facility]  Left voicemail for Joshua Schaefer with [facility] to set up [facility] appointment  [phone]    Prior to discharge tomorrow patient will need to complete a provisional discharge due to being revoked.     Discharge Plan or Goal:  Return to group home with outpatient supports     Barriers to Discharge:  Care coordination     Referral Status:  12/2/21 - [facility]. ( out-of-office until Monday, December 6) per patient request.      Legal Status:  Revoked     Jesenia Whittberg [facility], 12/8/2021, 3:54pm         Hesham Canela with Benjamin díaz: Amy of exercise

## 2022-06-10 ENCOUNTER — TELEPHONE (OUTPATIENT)
Dept: PSYCHIATRY | Facility: CLINIC | Age: 32
End: 2022-06-10
Payer: COMMERCIAL

## 2022-06-10 ENCOUNTER — HOSPITAL ENCOUNTER (EMERGENCY)
Facility: CLINIC | Age: 32
Discharge: HOME | End: 2022-06-11
Attending: EMERGENCY MEDICINE | Admitting: EMERGENCY MEDICINE
Payer: COMMERCIAL

## 2022-06-10 ENCOUNTER — HOSPITAL ENCOUNTER (EMERGENCY)
Facility: CLINIC | Age: 32
Discharge: STILL ADMITTED OR ADMINISTRATIVE | End: 2022-06-10
Payer: COMMERCIAL

## 2022-06-10 DIAGNOSIS — F11.10 OPIOID ABUSE, CONTINUOUS (H): ICD-10-CM

## 2022-06-10 DIAGNOSIS — R45.851 SUICIDE IDEATION: ICD-10-CM

## 2022-06-10 DIAGNOSIS — R44.0 AUDITORY HALLUCINATION: ICD-10-CM

## 2022-06-10 DIAGNOSIS — Z11.52 ENCOUNTER FOR SCREENING LABORATORY TESTING FOR SEVERE ACUTE RESPIRATORY SYNDROME CORONAVIRUS 2 (SARS-COV-2): ICD-10-CM

## 2022-06-10 DIAGNOSIS — F25.0 SCHIZOAFFECTIVE DISORDER, BIPOLAR TYPE (H): ICD-10-CM

## 2022-06-10 DIAGNOSIS — R44.0 AUDITORY HALLUCINATIONS: ICD-10-CM

## 2022-06-10 PROCEDURE — 250N000013 HC RX MED GY IP 250 OP 250 PS 637: Performed by: EMERGENCY MEDICINE

## 2022-06-10 PROCEDURE — 99285 EMERGENCY DEPT VISIT HI MDM: CPT | Mod: 25

## 2022-06-10 PROCEDURE — 99284 EMERGENCY DEPT VISIT MOD MDM: CPT | Performed by: EMERGENCY MEDICINE

## 2022-06-10 PROCEDURE — C9803 HOPD COVID-19 SPEC COLLECT: HCPCS

## 2022-06-10 RX ORDER — HYDROXYZINE HYDROCHLORIDE 50 MG/1
50 TABLET, FILM COATED ORAL ONCE
Status: COMPLETED | OUTPATIENT
Start: 2022-06-10 | End: 2022-06-10

## 2022-06-10 RX ADMIN — NICOTINE POLACRILEX 4 MG: 4 GUM, CHEWING BUCCAL at 23:26

## 2022-06-10 RX ADMIN — HYDROXYZINE HYDROCHLORIDE 50 MG: 50 TABLET, FILM COATED ORAL at 23:26

## 2022-06-10 NOTE — TELEPHONE ENCOUNTER
5:23 PM Pt called asking for Psych resident on call to contact them about AH.    5:27 PM Paged Psych resident
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
## 2022-06-11 ENCOUNTER — TELEPHONE (OUTPATIENT)
Dept: BEHAVIORAL HEALTH | Facility: CLINIC | Age: 32
End: 2022-06-11

## 2022-06-11 VITALS
HEART RATE: 109 BPM | RESPIRATION RATE: 16 BRPM | TEMPERATURE: 99.4 F | WEIGHT: 230 LBS | OXYGEN SATURATION: 97 % | HEIGHT: 67 IN | BODY MASS INDEX: 36.1 KG/M2 | SYSTOLIC BLOOD PRESSURE: 152 MMHG | DIASTOLIC BLOOD PRESSURE: 105 MMHG

## 2022-06-11 LAB
AMPHETAMINES UR QL SCN: ABNORMAL
BARBITURATES UR QL: ABNORMAL
BENZODIAZ UR QL: ABNORMAL
CANNABINOIDS UR QL SCN: ABNORMAL
COCAINE UR QL: ABNORMAL
HCG UR QL: NEGATIVE
OPIATES UR QL SCN: ABNORMAL
SARS-COV-2 RNA RESP QL NAA+PROBE: NEGATIVE

## 2022-06-11 PROCEDURE — 87635 SARS-COV-2 COVID-19 AMP PRB: CPT | Performed by: EMERGENCY MEDICINE

## 2022-06-11 PROCEDURE — 250N000013 HC RX MED GY IP 250 OP 250 PS 637: Performed by: EMERGENCY MEDICINE

## 2022-06-11 PROCEDURE — 80307 DRUG TEST PRSMV CHEM ANLYZR: CPT | Performed by: FAMILY MEDICINE

## 2022-06-11 PROCEDURE — 90791 PSYCH DIAGNOSTIC EVALUATION: CPT

## 2022-06-11 PROCEDURE — 250N000011 HC RX IP 250 OP 636: Performed by: EMERGENCY MEDICINE

## 2022-06-11 PROCEDURE — 81025 URINE PREGNANCY TEST: CPT | Performed by: EMERGENCY MEDICINE

## 2022-06-11 RX ORDER — ONDANSETRON 4 MG/1
4 TABLET, ORALLY DISINTEGRATING ORAL ONCE
Status: COMPLETED | OUTPATIENT
Start: 2022-06-11 | End: 2022-06-11

## 2022-06-11 RX ORDER — OLANZAPINE 10 MG/1
20 TABLET, ORALLY DISINTEGRATING ORAL AT BEDTIME
Status: DISCONTINUED | OUTPATIENT
Start: 2022-06-11 | End: 2022-06-11 | Stop reason: HOSPADM

## 2022-06-11 RX ORDER — ACETAMINOPHEN 500 MG
1000 TABLET ORAL ONCE
Status: COMPLETED | OUTPATIENT
Start: 2022-06-11 | End: 2022-06-11

## 2022-06-11 RX ORDER — QUETIAPINE FUMARATE 50 MG/1
50 TABLET, FILM COATED ORAL AT BEDTIME
Status: DISCONTINUED | OUTPATIENT
Start: 2022-06-11 | End: 2022-06-11 | Stop reason: HOSPADM

## 2022-06-11 RX ORDER — LITHIUM CARBONATE 450 MG
900 TABLET, EXTENDED RELEASE ORAL AT BEDTIME
Status: DISCONTINUED | OUTPATIENT
Start: 2022-06-11 | End: 2022-06-11 | Stop reason: HOSPADM

## 2022-06-11 RX ADMIN — ONDANSETRON 4 MG: 4 TABLET, ORALLY DISINTEGRATING ORAL at 00:56

## 2022-06-11 RX ADMIN — NICOTINE POLACRILEX 4 MG: 4 GUM, CHEWING BUCCAL at 11:23

## 2022-06-11 RX ADMIN — ACETAMINOPHEN 1000 MG: 500 TABLET ORAL at 11:23

## 2022-06-11 ASSESSMENT — ENCOUNTER SYMPTOMS
BRUISES/BLEEDS EASILY: 0
ABDOMINAL PAIN: 0
ARTHRALGIAS: 0
FEVER: 0
HEADACHES: 0
SHORTNESS OF BREATH: 0
EYE REDNESS: 0
CONFUSION: 0
NECK STIFFNESS: 0
WOUND: 1
COLOR CHANGE: 0
DIFFICULTY URINATING: 0

## 2022-06-11 NOTE — ED NOTES
Bed: HW09UR  Expected date: 6/10/22  Expected time: 8:54 PM  Means of arrival:   Comments:  [id number]  To george
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
## 2022-06-11 NOTE — ED NOTES
Handoff report given to SRINIVAS Fish.  Informed of course of ED stay and plan of care.  Polina verbalized understanding.

## 2022-06-11 NOTE — SAFE
Ayana (Jeniffer VINCENT Prasad  June 11, 2022  SAFE Note    Critical Safety Issues: History of schizoaffective disorder, bipolar type, presents voluntarily reporting high anxiety and command hallucinations. Stabbed himself with a knife on right forearm as the result of AH command. Denies active suicidal ideation.        Current Suicidal Ideation/Self-Injurious Concerns/Methods: None - N/A       Current or Historical Inappropriate Sexual Behavior: No       Current or Historical Aggression/Homicidal Ideation: No       Triggers: relational conflicts, fights with group home owner       Guardianship Status: is his own guardian.. Guardianship paperwork is not required.    This patient is a child/adolescent: No    This patient has additional special visitor precautions: No    Updated care team: Yes: Attending and RN    For additional details see full LMHP assessment.       Alice Almaraz

## 2022-06-11 NOTE — TELEPHONE ENCOUNTER
R: The pt is currently in the [facility] awaiting placement.     8:10a [facility]. The pt remains on the waitlist. Intake continues to identify appropriate bed placement.    1:13p Intake received notification from [facility]DEC that the pt is being discharged w/ outpatient supportive services. Intake updated the work-list. Intake no longer seeking active placement.

## 2022-06-11 NOTE — DISCHARGE INSTRUCTIONS
"Aftercare Plan  If I am feeling unsafe or I am in a crisis, I will:   Contact my established care providers   Call the National Suicide Prevention Lifeline: 497.981.1937   Go to the nearest emergency room   Call 911     Warning signs that I or other people might notice when a crisis is developing for me: distracted & isolating    Things I am able to do on my own to cope or help me feel better: draw, color, garden and play on game boy switch.     Changes I can make to support my mental health and wellness: continue to maintain all appointments. Discuss with OP therapist coping skills to reduce self-harm. Talk with psychiatrist about medications and the concerns regarding AH. Your next appointment with the psychiatrist is 6/17/2022    People in my life that I can ask for help: Parents are my supports and the  Domenica.    Your [facility] has a mental health crisis team you can call 24/7: [facility] Mobile Crisis  [phone] (adults)  [phone] (children)    Other things that are important when I'm in crisis: Yesterday you recognized that you needed to be seen in the ER, and did this because you wanted to be safe. If you begin to feel that you are unable      Additional resources and information:   Consider support groups via GILMA. There are numerous groups for adults living with MI.      Crisis Lines  Crisis Text Line  Text 277097  You will be connected with a trained live crisis counselor to provide support.    Por michell, texto  CHARITY a 859622 o texto a 442-AYUDAME en WhatsApp    The Manuel Project (LGBTQ Youth Crisis Line)  9.036.062.3700  text START to 200-403      Community Resources  Fast Tracker  Linking people to mental health and substance use disorder resources  fastmiDriveckermn.org     [place] Mental Health Warm Line  Peer to peer support  Monday thru Saturday, 12 pm to 10 pm  944.689.3140 or 1.774.584.5394  Text \"Support\" to 06219    National [place] on Mental Illness (GILMA) " " 631.248.9825 or 1.888.GILMA.HELPS      Mental Health Apps  My3  https://Nginx/    VirtualIntheGloeBox  https://ReFlow Medical/apps/virtual-hope-box/      Crisis Lines  Crisis Text Line  Text 096423  You will be connected with a trained live crisis counselor to provide support.    Por espanol, texto  CHARITY a 866168 o texto a 442-AYUDAME en WhatsApp    National Hope Line  1.800.SUICIDE [9709578]      Community Resources  Fast Tracker  Linking people to mental health and substance use disorder resources  Cartour.org     [place] Mental Health Warm Line  Peer to peer support  Monday thru Saturday, 12 pm to 10 pm  670.983.8945 or 6.965.787.3510  Text \"Support\" to 11013    National [place] on Mental Illness (GILMA)  508.045.9568 or 1.888.GILMA.HELPS      Mental Health Apps  My3  https://Nginx/    SocialCrunch  https://TrialReach.Jump On It/apps/virtual-hope-box/      Additional Information  Today you were seen by a licensed mental health professional through Triage and Transition services, [facility] ([facility])  for a crisis assessment in the Emergency Department at [facility].  It is recommended that you follow up with your established providers (psychiatrist, mental health therapist, and/or primary care doctor - as relevant) as soon as possible. Coordinators from [facility] will be calling you in the next 24-48 hours to ensure that you have the resources you need.  You can also contact [facility] coordinators directly at [phone]. You may have been scheduled for or offered an appointment with a mental health provider. [facility] maintains an extensive network of licensed behavioral health providers to connect patients with the services they need.  We do not charge providers a fee to participate in our referral network.  We match patients with providers based on a patient's specific needs, insurance coverage, and location.  Our first effort will be to refer you to a provider within your care " system, and will utilize providers outside your care system as needed.

## 2022-06-11 NOTE — ED PROVIDER NOTES
[facility] EMERGENCY DEPARTMENT ([facility])  6/10/22  History     Chief Complaint   Patient presents with     Suicidal     Pt reports auditory hallucination telling pt to kill self, went to ED and was not seen for 7 hours, went home, increase SI, cut wrist with steak knife. Called 911 after     The history is provided by the patient.     Ayana Prasad is a 31 year old adult who has a significant medical history of schizoaffective, bipolar 1 disorder, borderline personality disorder, ADHD, amongst others, who presents to the Emergency Department with c/o suicidal ideation and reports of self-harm.  The patient reports stabbing himself in the right arm today at the command of auditory hallucinations.  The patient afterword attempted to use coping skills, which did not relieve the commanding auditory hallucinations, the patient presents to the ED for further care.  They state that they experience hallucinations for 1 month, worsening over the last week they report living in a group home, the need for fighting with group home staff and other health assessment, they did not feel safe at the group home.  Patient reports passive SI for the last 2 months, but states they will not follow through after thinking about the consequences and how it affects their parents and their dog.  Patient reports having established psychiatrist, has been taking medications as prescribed.  They report a previous SI attempts 2019.  They also report a previous hospital admission 2021.  Reports he used cannabis daily, last use was on 6/9/2022. Patient presents for voluntary admission.    Past Medical History  Past Medical History:   Diagnosis Date     ADHD (attention deficit hyperactivity disorder)      Bipolar 1 disorder      Borderline personality disorder      Cauda equina syndrome      Chronic low back pain      Depression      GERD (gastroesophageal reflux disease)      h/o TBI (traumatic brain injury)      Hypertension,  unspecified type 12/16/2021     Marginal corneal ulcer, left 07/17/2015     Nephrolithiasis      obesity      Polysubstance abuse - methamphetamine, hallucinagen, heroin, marijuana     currently in remission     PONV (postoperative nausea and vomiting)      PTSD (post-traumatic stress disorder)      Past Surgical History:   Procedure Laterality Date     BACK SURGERY  12/24/2016     BACK SURGERY - For Cauda Equina Syndrome  12/24/2016     COLONOSCOPY       COMBINED CYSTOSCOPY, INSERT STENT URETER(S) Left 8/30/2018    Procedure: COMBINED CYSTOSCOPY, INSERT STENT URETER(S);  Cystoscopy With Left Stent Placement;  Surgeon: Kiran Ulrich MD;  Location: [facility] OR     COMBINED CYSTOSCOPY, RETROGRADES, EXCHANGE STENT URETER(S) Left 10/14/2018    Procedure: COMBINED CYSTOSCOPY, RETROGRADES, EXCHANGE STENT URETER(S);  Cystoscopy and removal of left-sided stent.;  Surgeon: Stiven Olivo MD;  Location:  OR     COMBINED CYSTOSCOPY, RETROGRADES, URETEROSCOPY, INSERT STENT  1/6/2014    Procedure: COMBINED CYSTOSCOPY, RETROGRADES, URETEROSCOPY, INSERT STENT;  Cystyoscopy place left ureteral stent;  Surgeon: Jaun Kimble MD;  Location: [facility] OR     COMBINED CYSTOSCOPY, RETROGRADES, URETEROSCOPY, INSERT STENT Left 10/23/2018    Procedure: Video cystoscopy, left ureteroscopy with stone extraction;  Surgeon: Bull Mast MD;  Location:  OR     CYSTOSCOPY, URETEROSCOPY, COMBINED Right 9/23/2015    Procedure: COMBINED CYSTOSCOPY, URETEROSCOPY;  Surgeon: ROME Jett MD;  Location: [facility] OR     ENT SURGERY       ESOPHAGOSCOPY, GASTROSCOPY, DUODENOSCOPY (EGD), COMBINED  4/8/2013    Procedure: COMBINED ESOPHAGOSCOPY, GASTROSCOPY, DUODENOSCOPY (EGD), BIOPSY SINGLE OR MULTIPLE;  Gastroscopy;  Surgeon: Peter Pickard MD;  Location: [facility] GI     ESOPHAGOSCOPY, GASTROSCOPY, DUODENOSCOPY (EGD), COMBINED Left 10/28/2014    Procedure: COMBINED ESOPHAGOSCOPY, GASTROSCOPY, DUODENOSCOPY (EGD), BIOPSY SINGLE OR  "MULTIPLE;  Surgeon: Narcisa Ramirez MD;  Location:  OR     ESOPHAGOSCOPY, GASTROSCOPY, DUODENOSCOPY (EGD), COMBINED N/A 12/24/2018    Procedure: COMBINED ESOPHAGOSCOPY, GASTROSCOPY, DUODENOSCOPY (EGD), BIOPSY SINGLE OR MULTIPLE;  Surgeon: Sonu Verduzco MD;  Location: [facility] GI     INJECT EPIDURAL TRANSFORAMINAL LUMBAR / SACRAL EA ADDITIONAL LEVEL Left 3/18/2021    Procedure: Left L5/S1 transforaminal injection for selective L5 nerve root block;  Surgeon: Eliza Pagan MD;  Location: [facility] OR     LAPAROSCOPIC CHOLECYSTECTOMY  11/20/2014    [facility], Dr. Ramirez     LASER HOLMIUM LITHOTRIPSY URETER(S), INSERT STENT, COMBINED  4/2/2014    Procedure: COMBINED CYSTOSCOPY, URETEROSCOPY, LASER HOLMIUM LITHOTRIPSY URETER(S), INSERT STENT;  Cystoscopy,Left Ureteral Stent Removal,Left Ureteroscopy with Laser Lithotripsy,Left Ureter Stent Placement;  Surgeon: ROME Jett MD;  Location: [facility] OR     Transurethral stone resection  03/11/2011     lithium ER (LITHOBID) 300 MG CR tablet  needle, disp, 18G X 1\" MISC  Needle, Disp, 25G X 5/8\" MISC  nicotine (NICORETTE) 2 MG gum  OLANZapine (ZYPREXA) 20 MG tablet  QUEtiapine (SEROQUEL) 50 MG tablet  syringe, disposable, 1 ML MISC  testosterone cypionate (DEPOTESTOSTERONE) 200 MG/ML injection      Allergies   Allergen Reactions     Haldol [Haloperidol] Other (See Comments)     Makes patient very angry and anxious     Adhesive Tape Hives     Percocet [Oxycodone-Acetaminophen] Nausea and Vomiting     Prednisone Other (See Comments) and Hives     Suicidal ideation     Risperidone Other (See Comments)     Tramadol Hcl Nausea and Vomiting     Droperidol Anxiety     Seroquel [Quetiapine] Palpitations     Spent 2 weeks in the hospital due to having seroquel, caused palpitations and QT prolongation     Family History  Family History   Problem Relation Age of Onset     Gastrointestinal Disease Father         Crohn's Disease     Cancer Father         Liver Cancer     Other Cancer " Father         Liver     Obesity Father      Cancer Maternal Grandmother         lympoma     Diabetes Maternal Grandmother      Mental Illness Maternal Grandmother      Other Cancer Maternal Grandmother         Non Hodgkins Lymphoma     Diabetes Maternal Grandfather      Hypertension Maternal Grandfather      Prostate Cancer Maternal Grandfather      Hyperlipidemia Maternal Grandfather      Heart Disease Paternal Grandfather         heart disease     Hypertension Brother      Other Cancer Brother         Esophagecial     Diabetes Brother      Obesity Brother      Hyperlipidemia Mother      Mental Illness Mother      Anxiety Disorder Mother      Anesthesia Reaction Mother         Vomiting/Nausea     Other Cancer Mother      Hypertension Brother      Other Cancer Brother      Other Cancer Paternal Half-Brother      No Known Problems Sister      Social History   Social History     Tobacco Use     Smoking status: Former Smoker     Packs/day: 0.00     Years: 5.00     Pack years: 0.00     Types: Dip, chew, snus or snuff, Other     Start date: 8/1/2011     Smokeless tobacco: Former User     Types: Chew     Quit date: 12/17/2019   Vaping Use     Vaping Use: Former     Substances: Nicotine, Flavoring     Devices: Refillable tank   Substance Use Topics     Alcohol use: No     Drug use: Not Currently     Types: Marijuana, Opiates     Comment: oxy, heroin (smoke)      Past medical history, past surgical history, medications, allergies, family history, and social history were reviewed with the patient. No additional pertinent items.     I have reviewed the Medications, Allergies, Past Medical and Surgical History, and Social History in the Epic system.    Review of Systems   Constitutional: Negative for fever.   HENT: Negative for congestion.    Eyes: Negative for redness.   Respiratory: Negative for shortness of breath.    Cardiovascular: Negative for chest pain.   Gastrointestinal: Negative for abdominal pain.   Endocrine:  "Negative for polyuria.   Genitourinary: Negative for difficulty urinating.   Musculoskeletal: Negative for arthralgias and neck stiffness.   Skin: Positive for wound. Negative for color change.   Allergic/Immunologic: Negative for immunocompromised state.   Neurological: Negative for headaches.   Hematological: Does not bruise/bleed easily.   Psychiatric/Behavioral: Positive for self-injury and suicidal ideas. Negative for confusion.   All other systems reviewed and are negative.    A complete review of systems was performed with pertinent positives and negatives noted in the HPI, and all other systems negative.    Physical Exam   BP: (!) 155/97  Pulse: 109  Temp: 99.4  F (37.4  C)  Resp: 18  Height: 170.2 cm (5' 7\")  Weight: 104.3 kg (230 lb)  SpO2: 98 %      Physical Exam  General: Afebrile, no acute distress   HEENT: Normocephalic, atraumatic, conjunctivae normal. MMM  Neck: non-tender, supple  Cardio: regular rate. regular rhythm   Resp: Normal work of breathing, no respiratory distress, lungs clear bilaterally, no wheezing, rhonchi, rales  Chest/Back: no visual signs of trauma, no CVA tenderness   Abdomen: soft, non distension, no tenderness, no peritoneal signs   Neuro: alert and fully oriented. CN II-XII grossly intact. Grossly normal strength and sensation in all extremities.   MSK: no deformities. Normal range of motion  Integumentary/Skin: right forearm with superficial laceration wit no active bleeding, no repair needed, no rash visualized, normal color  Psych: normal affect, normal behavior    ED Course     At 12:43 AM the patient was seen and examined by Jayne Beasley MD in Room HW01.        Procedures      Results for orders placed or performed during the hospital encounter of 06/10/22 (from the past 24 hour(s))   Urine Drugs of Abuse Screen    Narrative    The following orders were created for panel order Urine Drugs of Abuse Screen.  Procedure                               Abnormality         " Status                     ---------                               -----------         ------                     Drug abuse screen 1 urin...[[id number]]  Abnormal            Final result                 Please view results for these tests on the individual orders.   Drug abuse screen 1 urine (ED)   Result Value Ref Range    Amphetamines Urine Screen Negative Screen Negative    Barbiturates Urine Screen Negative Screen Negative    Benzodiazepines Urine Screen Negative Screen Negative    Cannabinoids Urine Screen Positive (A) Screen Negative    Cocaine Urine Screen Negative Screen Negative    Opiates Urine Screen Negative Screen Negative   HCG qualitative urine   Result Value Ref Range    hCG Urine Qualitative Negative Negative   Asymptomatic COVID-19 Virus (Coronavirus) by PCR Nasopharyngeal    Specimen: Nasopharyngeal; Swab   Result Value Ref Range    SARS CoV2 PCR Negative Negative    Narrative    Testing was performed using the janet  SARS-CoV-2 & Influenza A/B Assay on the janet  Gabriela  System.  This test should be ordered for the detection of SARS-COV-2 in individuals who meet SARS-CoV-2 clinical and/or epidemiological criteria. Test performance is unknown in asymptomatic patients.  This test is for in vitro diagnostic use under the FDA EUA for laboratories certified under CLIA to perform moderate and/or high complexity testing. This test has not been FDA cleared or approved.  A negative test does not rule out the presence of PCR inhibitors in the specimen or target RNA in concentration below the limit of detection for the assay. The possibility of a false negative should be considered if the patient's recent exposure or clinical presentation suggests COVID-19.  [facility] are certified under the Clinical Laboratory Improvement Amendments of 1988 (CLIA-88) as qualified to perform moderate and/or high complexity laboratory testing.     *Note: Due to a large number of results and/or encounters  for the requested time period, some results have not been displayed. A complete set of results can be found in Results Review.     Medications   lithium (ESKALITH CR/LITHOBID) CR tablet 900 mg (has no administration in time range)   OLANZapine zydis (zyPREXA) ODT tab 20 mg (has no administration in time range)   QUEtiapine (SEROquel) tablet 50 mg (has no administration in time range)   hydrOXYzine (ATARAX) tablet 50 mg (50 mg Oral Given 6/10/22 2326)   nicotine polacrilex (NICORETTE) gum 4 mg (4 mg Buccal Given 6/10/22 2326)   ondansetron (ZOFRAN ODT) ODT tab 4 mg (4 mg Oral Given 6/11/22 0056)             Assessments & Plan (with Medical Decision Making)   Ayana Prasad is a 31 year old adult who has a significant medical history of schizoaffective, bipolar 1 disorder, borderline personality disorder, ADHD, amongst others, who presents to the Emergency Department with c/o suicidal ideation and reports of self-harm.  Upon arrival patient is calm, cooperative, no distress.  Behavioral health  eval the patient as well as myself at this time plan on voluntary admission for further evaluation and stabilization for auditory hallucinations, suicidal ideation, and self-injurious behavior.  Patient understands and agrees with the plan.    I have reviewed the nursing notes.    I have reviewed the findings, diagnosis, plan and need for follow up with the patient.    New Prescriptions    No medications on file       Final diagnoses:   Auditory hallucinations   Suicide ideation       I, Fermin Braswell am serving as a trained medical scribe to document services personally performed by Jayne Beasley, based on the provider's statements to me.      I, Jayne Beasley, was physically present and have reviewed and verified the accuracy of this note documented by Fermin Braswell.     Jayne Beasley MD  6/10/2022   [facility] EMERGENCY DEPARTMENT     Jayne Beasley MD  06/11/22 3423

## 2022-06-11 NOTE — ED PROVIDER NOTES
Patient was signed out to me by Dr. Soo Beasley this morning with plan for admission.  Patient has been reassessed by the DEC  and patient states he is feeling much better at this time.  Is not have any suicidal thoughts, his auditory hallucinations have quieted, he feels like the auditory hallucinations are under much better control at this time.  He would like to go back to his group home.  Since the patient feels better, I think this is reasonable.  He is comfortable taking a cab back to the group home and states he feels safe with this plan.     Vesna Hester MD  06/11/22 1930

## 2022-06-11 NOTE — CONSULTS
"Diagnostic Evaluation Crisis   Assessment    Patient was assessed: in person  Patient location: [facility]  Was a release of information signed: Yes. Providers included on the release: [facility]      Referral Data and Chief Complaint  Ayana (Lon Randall) is a 31 year old who uses he/him they/them pronouns. presented to the ED via EMS and was referred to the ED by self. Patient is presenting to the ED for the following concerns: increased psychosis symptoms and SI.      Informed Consent and Assessment Methods     Patient is his own guardian. Writer met with patient and explained the crisis assessment process, including applicable information disclosures and limits to confidentiality, assessed understanding of the process, and obtained consent to proceed with the assessment. Patient was observed to be able to participate in the assessment as evidenced by X4 orientation and active engagement. Assessment methods included conducting a formal interview with patient, review of medical records, collaboration with medical staff, and obtaining relevant collateral information from family and community providers when available.     Summary of Patient Situation    Ayana, lon Randall, presented to the ED via EMS due to increased auditory hallucination and SI. Throughout the assessment, the patient was calm and cooperative. He endorsed high anxiety and auditory hallucinations. Patient stated that at baseline, he experiences auditory hallucinations with occasional command hallucinations. Patient reported that within the past week, the command hallucinations has worsen due stress. Patient reported that the voices (a male and a female) commanded him to elope from the group home and \"do things to hurt myself.\" Today, patient followed the command and stabbed himself in the right forearm. Patient stated that the voices are getting louder, and are more difficult to tune out. Additionally, patient endorsed " "increased passive suicidal ideation. Patient denied suicidal intention or plan. Patient denied HI and intentional SIB. Patient was able to name some protective factors. However, patient also reported feeling unsafe because \"I'm losing control. I'm afraid they will tell me to harm myself again.\" Patient reported a history of 3 suicidal attempts, most recent in 2019, due to command hallucinations.       Patient denied other psychosis symptoms. Patient endorsed a history of abusing of opiates, ectasy, cannabis, and methamphetamines. Currently, patient reportedly uses marijuana (daily, last use 1 day ago) to cope with the anxiety and voices. Patient works with a medication management provider. He reportedly takes all medications as prescribed. Patient also receives weekly individual therapy.     Brief Psychosocial History      Patient has been living at this current group home for about 1 year ([facility], [phone]) in [place]. Patient lives with 5 other housemates with 24/7 supervision 1:4 staffing. Patient stated he is unemployed. Patient denied penidng legal issues. Patient is currently on MI commitment.      Significant clinical changes since last assessment      Patient has a history of schizoaffective disorder, bipolar type, PTSD, ADHD, borderline personality disorder, and polysubstance abuse. He is currently under MI commitment with [facility]. Patient has a history of multiple psychiatric hospitalizations, the most recent of which appears to have been Nov-Dec, 2021 at [facility].      Records indicate that the chronic, occasional auditory hallucinations patient has endorsed may actually be flashbacks from previous trauma.      Collateral Information    ED records and Care Everywhere information reviewed.    Writer attempted to contact patient's group home at  [phone]. Requested a call back.      Risk Assessment     ESS-6  1.a. Over the past 2 weeks, have you had thoughts of killing yourself? No  1.b. " Have you ever attempted to kill yourself and, if yes, when did this last happen? Yes in 2021  2. Recent or current suicide plan? No   3. Recent or current intent to act on ideation? No  4. Lifetime psychiatric hospitalization? Yes  5. Pattern of excessive substance use? Yes  6. Current irritability, agitation, or aggression? No    Scoring note: BOTH 1a and 1b must be yes for it to score 1 point, if both are not yes it is zero. All others are 1 point per number. If all questions 1a/1b - 6 are no, risk is negligible. If one of 1a/1b is yes, then risk is mild. If either question 2 or 3, but not both, is yes, then risk is automatically moderate regardless of total score. If both 2 and 3 are yes, risk is automatically high regardless of total score.      Score: 2, mild risk      Is the patient a vulnerable adult? Yes     Does the patient engage in non-suicidal self-injurious behavior (NSSI/SIB)? Denied intention SIB     Does the patient have thoughts of harming others? No     Is the patient engaging in sexually inappropriate behavior?  no      Current Substance Abuse     Is there recent substance abuse? Marijuana      Was a urine drug screen or blood alcohol level obtained: Yes pending     Mental Status Exam     Affect: Appropriate   Appearance: Appropriate    Attention Span/Concentration: Attentive?    Eye Contact: Variable   Fund of Knowledge: Appropriate    Language /Speech Content: Fluent   Language /Speech Volume: Normal    Language /Speech Rate/Productions: Normal    Recent Memory: Intact   Remote Memory: Variable   Mood: Anxious    Orientation to Person: Yes    Orientation to Place: Yes   Orientation to Time of Day: Yes    Orientation to Date: Yes    Situation (Do they understand why they are here?): Yes    Psychomotor Behavior: Normal    Thought Content: Hallucinations   Thought Form: Intact      History of commitment: Yes Current MI commitment     Medication    Psychotropic medications: Yes, reportedly takes all  meds as prescribed     Current Care Team    Primary provider: None  Psychiatrist: BROOKLYN Calvin, CNP @ [facility], last visit was 4/2022  Therapist: Deborah at [facility]   : Senia Arreaga @ [facility] ([phone])   [facility] or [facility]: Yes but can't recall name   ACT Team: No     Diagnosis      Schizoaffective disorder, bipolar type (F25.0)  Attention-Deficit/Hyperactivity Disorder  314.01 (F90.2) Combined presentation - by history   309.81 (F43.10) Posttraumatic Stress Disorder (includes Posttraumatic Stress Disorder for Children 6 Years and Younger)  Without dissociative symptoms - by history   301.83 (F60.3) Borderline Personality Disorder - by history      Disposition    Recommended disposition: Inpatient Mental Health       Reviewed case and recommendations with attending provider. Attending Name: Dr. Beasley       Attending concurs with disposition: Yes       Patient concurs with disposition: Yes       Guardian concurs with disposition: NA      Final disposition: Inpatient mental health .     Inpatient Details (if applicable):  Is patient admitted voluntarily:Yes      Patient aware of potential for transfer if there is not appropriate placement? Yes       Patient is willing to travel outside of the [facility] for placement? No      Behavioral Intake Notified? Yes: Date: 6/11/2022 Time: 1:00am.     Clinical Substantiation of Recommendations   Patient presents voluntarily seeking admission for increased anxiety and command hallucinations. Patient endorsed that he feel unsafe due to the  commands. Patient stabbed himself this morning as the result of  commands. Patient is appropriate for inpatient mental health programming for symptoms stabilization and risk reduction.  Assessment Details    Patient interview started at: 11:45pm and completed at: 12:45am.     Total duration spent on the patient case in minutes: 1.0 hrs      CPT code(s) utilized: 88553 - Psychotherapy for  Crisis - 60 (30-74*) min       Alice Domingo-Le  Psychotherapist, [facility] - Triage & Transition Services

## 2022-06-13 ENCOUNTER — TELEPHONE (OUTPATIENT)
Dept: PSYCHIATRY | Facility: CLINIC | Age: 32
End: 2022-06-13
Payer: COMMERCIAL

## 2022-06-13 NOTE — TELEPHONE ENCOUNTER
[facility] Call Center    Phone Message    May a detailed message be left on voicemail: yes     Reason for Call: Symptoms or Concerns     If patient has red-flag symptoms, warm transfer to triage line    Current symptom or concern: anxiety and auditory hallucinations. Pt says that this is an ongoing situation that they would like a call from the nurses to discuss.        By mattie arrieta:     [facility] [place] [place] [address] - [place], [place] - [facility] [address]        Action Taken: Message routed to:  Other: nursing pool    Travel Screening: Not Applicable

## 2022-06-13 NOTE — TELEPHONE ENCOUNTER
Spoke with patient and they were seen in the ED over the weekend for increased anxiety and command hallucinations to hurt himself. He stabbed self in the right forearm. After a while he requested to discharge and was reassessed and he was discharged back to group home.   At this time patient is still very anxious and is reporting AH but states that they are able to be managed at this time. Denies thoughts of self harm, thoughts of harming others, and suicidal ideation. They feel like the anxiety makes the AH worse. Patient was given hydroxyzine in the ED and felt like it was not very helpful. They would like to try the gabapentin that Regine had discussed previously.     Message sent to provider.

## 2022-06-13 NOTE — TELEPHONE ENCOUNTER
[facility] Progress Note Date: 2019 Name: Jean-Pierre Gutierrez MRN: [id number] : 1961 Daily Invanz and Daptomycin infusions Ms. Mendel Burnette was assessed and education was provided. Ms. Sylvain Zuniga vitals were reviewed. Visit Vitals /77 (BP 1 Location: Left arm, BP Patient Position: Sitting) Pulse 66 Temp 98.6 °F (37 °C) Resp 18 SpO2 96% Lab results were obtained and reviewed 2019. No results found for this or any previous visit (from the past 12 hour(s)). Good blood return obtaiend from each lumen of PICC line at right upper arm, followed with 10 ml NS flush per lumen. 
 
  []  Vancomycin  
  [x]  Invanz 1 gram/50 ml  
  []  Cubicin  
  []  Rocephin  
 was infused via purple lumen at 100 ml/hr, followed with 20 ml NS flush. Daptomycin 550 mg/11 ml was infused via red lumen by slow IVP, followed with 10 ml NS. Each lumen flushed with 2.5 ml of 100 units/ml Heparin. New Curos caps placed, then lumens wrapped and secured. Ms. Mendel Burnette tolerated infusion, and had no complaints at this time. Patient armband removed and shredded. Ms. Mendel Burnette was discharged from [place] [address] in stable condition at 1600. She is to return on 2019 at  for her next appointment for Invanz and Daptomycin infusions. Kassidy Spears RN 2019 
4:09 PM 
 
 Spoke to patient again to see if he wants to wait until tomorrow to talk with Regine or does he want writer to reach out to a different provider. Writer feels more comfortable waiting to talk with Regine tomorrow. He will take an extra 1/2 of olanzapine as directed by the on-call doctor this weekend. From note on 6/10/22:   Plan:   - patient will take 1/2 tablet of olanzapine as daily PRN over the weekend  while continuing to take 20 mg hs as prescribed. Patient knows not to  exceed 30 mg in a day.     Patient offered appointment that opened up for tomorrow and took it. Patient has after hours line number and agrees to call if any issues tonight.

## 2022-06-13 NOTE — PROGRESS NOTES
Preceptor Attestation:   Patient seen and evaluated via video visit. I have verified the content of the note, which accurately reflects my assessment of the patient and the plan of care.   Supervising Physician:  Pieter Chatterjee MD.

## 2022-06-14 ENCOUNTER — VIRTUAL VISIT (OUTPATIENT)
Dept: PSYCHIATRY | Facility: CLINIC | Age: 32
End: 2022-06-14
Attending: NURSE PRACTITIONER
Payer: COMMERCIAL

## 2022-06-14 ENCOUNTER — TELEPHONE (OUTPATIENT)
Dept: URGENT CARE | Facility: URGENT CARE | Age: 32
End: 2022-06-14

## 2022-06-14 DIAGNOSIS — F11.21 OPIOID USE DISORDER, MODERATE, IN EARLY REMISSION (H): ICD-10-CM

## 2022-06-14 DIAGNOSIS — F25.0 SCHIZOAFFECTIVE DISORDER, BIPOLAR TYPE (H): Primary | ICD-10-CM

## 2022-06-14 DIAGNOSIS — F12.20 CANNABIS USE DISORDER, SEVERE, DEPENDENCE (H): ICD-10-CM

## 2022-06-14 DIAGNOSIS — F43.10 PTSD (POST-TRAUMATIC STRESS DISORDER): ICD-10-CM

## 2022-06-14 DIAGNOSIS — F16.21: ICD-10-CM

## 2022-06-14 DIAGNOSIS — F15.21 AMPHETAMINE USE DISORDER, MODERATE, IN EARLY REMISSION (H): ICD-10-CM

## 2022-06-14 PROCEDURE — 99215 OFFICE O/P EST HI 40 MIN: CPT | Mod: 95 | Performed by: NURSE PRACTITIONER

## 2022-06-14 RX ORDER — GABAPENTIN 600 MG/1
120 TABLET ORAL AT BEDTIME
COMMUNITY
End: 2022-06-22

## 2022-06-14 RX ORDER — HYDROXYZINE HYDROCHLORIDE 25 MG/1
25-50 TABLET, FILM COATED ORAL 2 TIMES DAILY PRN
COMMUNITY
End: 2022-07-18

## 2022-06-14 ASSESSMENT — ANXIETY QUESTIONNAIRES
5. BEING SO RESTLESS THAT IT IS HARD TO SIT STILL: SEVERAL DAYS
GAD7 TOTAL SCORE: 9
GAD7 TOTAL SCORE: 9
6. BECOMING EASILY ANNOYED OR IRRITABLE: SEVERAL DAYS
8. IF YOU CHECKED OFF ANY PROBLEMS, HOW DIFFICULT HAVE THESE MADE IT FOR YOU TO DO YOUR WORK, TAKE CARE OF THINGS AT HOME, OR GET ALONG WITH OTHER PEOPLE?: EXTREMELY DIFFICULT
1. FEELING NERVOUS, ANXIOUS, OR ON EDGE: NEARLY EVERY DAY
7. FEELING AFRAID AS IF SOMETHING AWFUL MIGHT HAPPEN: SEVERAL DAYS
7. FEELING AFRAID AS IF SOMETHING AWFUL MIGHT HAPPEN: SEVERAL DAYS
GAD7 TOTAL SCORE: 9
3. WORRYING TOO MUCH ABOUT DIFFERENT THINGS: SEVERAL DAYS
4. TROUBLE RELAXING: SEVERAL DAYS
2. NOT BEING ABLE TO STOP OR CONTROL WORRYING: SEVERAL DAYS
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
## 2022-06-14 NOTE — PATIENT INSTRUCTIONS
-Reduce Gabapentin to 600 mg daily x 3 days, 300 mg x 3 days and discontinue.  -Continue on current medication regimen for now.  -May take additional Zyprexa 10 mg daily as needed for anxiety.  -May take Hydroxyzine 25-50 mg up to 2 times a day as needed for anxiety    -Will message you on change of the medication and follow up.    **For crisis resources, please see the information at the end of this document**   Patient Education    Thank you for coming to the [facility] MENTAL HEALTH & ADDICTION [place] [facility].     Lab Testing:  If you had lab testing today and your results are reassuring or normal they will be mailed to you or sent through Zones within 7 days. If the lab tests need quick action we will call you with the results. The phone number we will call with results is # [phone]. If this is not the best number please call our clinic and change the number.     Medication Refills:  If you need any refills please call your pharmacy and they will contact us. Our fax number for refills is [phone].   Three business days of notice are needed for general medication refill requests.   Five business days of notice are needed for controlled substance refill requests.   If you need to change to a different pharmacy, please contact the new pharmacy directly. The new pharmacy will help you get your medications transferred.     Contact Us:  Please call [phone] during business hours (8-5:00 M-F).   If you have medication related questions after clinic hours, or on the weekend, please call [phone].     Financial Assistance [phone]   Medical Records [phone]       MENTAL HEALTH CRISIS RESOURCES:  For a emergency help, please call 911 or go to the nearest Emergency Department.     Emergency Walk-In Options:   Em[facility] Unit @ [facility] ([place]): [phone] - Specialized mental health emergency area designed to be calming  [facility] ([place]):  [phone]  [facility] ([place]): [phone]  [facility] ([place]): [phone]    [facility] Crisis Information:   [place]: [phone]  [place]: [phone]  [place] ([place]) - Adult: [phone]     Child: [phone]  [place] - Adult: [phone]     Child: [phone]  [place]: [phone]  List of all [facility] resources:   https://[place].gov/dhs/people-we-serve/adults/health-care/mental-health/resources/crisis-contacts.jsp    National Crisis Information:   Crisis Text Line: Text  [place]  to 021756  National Suicide Prevention Lifeline: 4-485-296-TALK (1-327.919.1165)       For online chat options, visit https://suicidepreventionlifeline.org/chat/  Poison Control Center: 1-644.541.7988  Trans Lifeline: 6-434-742-2414 - Hotline for transgender people of all ages  The [employer] Project: 1-590-605-0623 - Hotline for LGBT youth     For Non-Emergency Support:   Fast Tracker: Mental Health & Substance Use Disorder Resources -   https://www.[employer].org/

## 2022-06-14 NOTE — TELEPHONE ENCOUNTER
Prior Authorization Retail Medication Request    Medication/Dose: testosterone cypionate (DEPOTESTOSTERONE) 200 MG/ML injection  ICD code (if different than what is on RX):  Gender dysphoria [F64.9]  - Primary   Previously Tried and Failed:  See chart  Rationale:  See chart    Insurance Name:  Sussy  Insurance ID:  [id number]      Pharmacy Information (if different than what is on RX)  Name:  [facility]   Phone:  [phone]

## 2022-06-15 ENCOUNTER — ANCILLARY PROCEDURE (OUTPATIENT)
Dept: MRI IMAGING | Facility: CLINIC | Age: 32
End: 2022-06-15
Attending: NURSE PRACTITIONER
Payer: COMMERCIAL

## 2022-06-15 ENCOUNTER — MYC MEDICAL ADVICE (OUTPATIENT)
Dept: PSYCHIATRY | Facility: CLINIC | Age: 32
End: 2022-06-15

## 2022-06-15 ENCOUNTER — TELEPHONE (OUTPATIENT)
Dept: PSYCHIATRY | Facility: CLINIC | Age: 32
End: 2022-06-15
Payer: COMMERCIAL

## 2022-06-15 DIAGNOSIS — R29.898 BILATERAL ARM WEAKNESS: ICD-10-CM

## 2022-06-15 DIAGNOSIS — F25.0 SCHIZOAFFECTIVE DISORDER, BIPOLAR TYPE (H): Primary | ICD-10-CM

## 2022-06-15 PROCEDURE — 70551 MRI BRAIN STEM W/O DYE: CPT | Mod: GC | Performed by: RADIOLOGY

## 2022-06-15 RX ORDER — FLUPHENAZINE HYDROCHLORIDE 1 MG/1
1 TABLET ORAL DAILY
Qty: 30 TABLET | Refills: 1 | Status: SHIPPED | OUTPATIENT
Start: 2022-06-15 | End: 2022-07-07

## 2022-06-15 NOTE — PROGRESS NOTES
After consultation with Pharm D, decided following;    1) Stop Seroquel 50 mg at bedtime.     2) Start Prolixin 1 mg daily and will continue this for 1-2 weeks (depending on how you are doing), then will decrease Zyprexa to 15 mg at bedtime.     3) If your symptoms are not well managed, then we will plan to increase Prolixin 1 mg 2 times a day.  (This was the dose you were on prior to switching to injection) and will continue to reduce Zyprexa.     The goal is to keep you only on Prolixin at the end.    Discontinued Seroquel, placed an order of Prolixin 1 mg daily.    Informed the plan to pt and he agreed and will follow up on 6/22 (Wed) at 1:30pm.      Regine Last, NELLY, 6/15/2022

## 2022-06-15 NOTE — TELEPHONE ENCOUNTER
----- Message from BROOKLYN Calvin CNP sent at 6/14/2022  6:24 PM CDT -----  Would you contact CM and ask couple things;    1) recommittment paperwork.  I filed for extension/recommittment and this was approved, I believe.  But we don't have any paperwork.  Pls have them send commitment paperwork.    2) pt noted that he will be moving to [place] sometimes this year.  Pt noted he is committed until 2023.  If he moves to [place], does commitment need to change to different country?  If so, when do we need to do paperwork?    3) pt noted he is doing virtual outpt [facility] program at [facility] (where I believe CM is from).  We need pt to sign VAL and Can we get progress notes/summary?  When does pt finish the program?    4) pt could not recall name of the therapist at the [facility].  Do you have contacts?  Can we get VAL and also can we get summary from therapist?    Pt could not track the name and contact information for [facility] program or therapist.  I can't tell pt is trying to avoid telling me because he said AH is telling him no to tell much with me, but this was most interactive pt has been since I started seeing him in 8/2021.    Thank you!

## 2022-06-15 NOTE — TELEPHONE ENCOUNTER
Spoke to  Senia about the followin)Commitment is up in 2023. She is faxing over the paperwork for our records.     2)She states that commitment will just follow patient. No need to do anything for his move.     3)Called and left message for Hiram, treatment director at the [facility], to get information to fill out VAL. Will send VAL to patient once information is obtained.     4) The only information that she has for [facility] is that therapist's name is Deborah Rosas, patient did not fill out VAL for her to have contact with them. VAL started and sent to patient.     Message sent to provider.

## 2022-06-15 NOTE — TELEPHONE ENCOUNTER
Spoke with Hiram Villela - treatment director at [facility]. Program is tailored to individual and can be 3 months to over a year. He said to send releases to him at fax [phone]. VAL started and sent to patient to complete via [employer]

## 2022-06-16 ENCOUNTER — TELEPHONE (OUTPATIENT)
Dept: PSYCHIATRY | Facility: CLINIC | Age: 32
End: 2022-06-16
Payer: COMMERCIAL

## 2022-06-16 DIAGNOSIS — R29.898 BILATERAL ARM WEAKNESS: Primary | ICD-10-CM

## 2022-06-16 NOTE — TELEPHONE ENCOUNTER
Received 12 pages of [facility] court records from [facility]. Records were sent to scanning and a hard copy is being held in nurse triage until scanning is confirmed. Lucie Chan, KATHIE has already reviewed these records. Mercy Gonzalez, EMT

## 2022-06-17 NOTE — TELEPHONE ENCOUNTER
Central Prior Authorization Team   Phone: [phone]      PA Initiation    Medication: testosterone cypionate (DEPOTESTOSTERONE) 200 MG/ML injection--INITIATED  Insurance Company: [employer] Part D - Phone [phone] Fax [phone]  Pharmacy Filling the Rx: [id number] - [place], [place] - [address] [place] [address], [address]  Filling Pharmacy Phone: [phone]  Filling Pharmacy Fax:    Start Date: 6/17/2022

## 2022-06-17 NOTE — PLAN OF CARE
[facility]    Justine Fernandes  1957   [id number]      Patient Care Team:  Radha Cooney PA-C as PCP - General (Physician Assistant)    Chief Complaint::   Chief Complaint   Patient presents with   • Diabetes   • Anxiety     C/o        HPI    Justine Fernandes is a 65-year-old female, date of birth 1957, who presents today to [facility] care. Her past medical history is significant for anxiety. She is currently taking Lexapro 10 mg tablets daily. She also has significant history for hypothyroidism and diabetes. She is compliant on levothyroxine 75 mcg daily and metformin 500 mg daily with breakfast. She has upcoming appointments scheduled for gastroenterology and hematology. She is taking a significant amount of supplements.     Family and past history  The patient was born and raised in [place]. Her brother and step-father are . Her biological father  at 57 years old from lung cancer. He also had heart issues. Her mother is , and she had breast cancer and colon cancer the age of 80, but she did not die from cancer. One of her brothers had prostate cancer and the other ones have had melanoma a couple of times. She has 2 half brothers and sisters. Her half sister  from breast cancer. Her other sister had melanoma a couple of times. The patient has had a total hip replacement.    Heart issues  The patient has been told that she has bundle branch block as well as heart defect. She has done an echocardiogram. She has been following up with cardiology.     Sleep apnea  She states that her sleep apnea is mild. She is not on a CPAP. She states that she is tired at all time.     Anxiety  She reports that she had mild anxiety several years ago and was put on Lexapro. She is still taking the medication, and it works well for her.    Diabetes  The patient states that she has diabetes. She was diagnosed 5 years ago. She is staking 500 mg of metformin. She is due for a  Problem: Manic Symptoms  Goal: Manic Symptoms  Signs and symptoms of listed problems will be absent or manageable.   Patient will report less lability of mood  Patient will maintain appropriate boundaries with peers and staff  Patient speech will be notable for appropriate content, rate, and volume  Patient will report less difficulty falling and staying asleep   Outcome: Adequate for Discharge Date Met:  06/27/17  Patient prepared for discharge very organized and excited for discharge.  Bright affect, thoughts clear.  Denies any self harm or suicidal thoughts.  Has girl friend here to take her home and to gp living.  No questions and no concerns. All valuables returned. Medication from out patient pharmacy. Happy about leaving hospital.       diabetic eye examination and has an appointment in 07/2022. She has seen a podiatrist once for pain in her feet.      Asthma  She reports having mild asthma. She takes albuterol a couple of times a year if her allergies become severe.     Varicose veins   She does have varicose veins and spider veins in her legs. She had a varicose vein removed a few years ago. She is not interested wearing compression stockings.    Leukopenia  She has had leukopenia in the last several years. Her last testing dropped to 2.8. She has been following up with hematology. She is scheduled to do a bone marrow biopsy in 07/2022.    Family history of melanoma  The patient has been following up with dermatology once a year since she does have a family history of melanoma. She did have a small basal cell in the past.     Hypothyroidism  The patient would like to check her thyroid. She is on Synthroid. She is fasting today.    History of UTI  She states that she had a urinary tract infection a few years ago, but she does not get it frequently.    Vitamin D deficiency  She was diagnosed with vitamin D deficiency many years ago. She is still taking vitamin D supplement.    Reginaldo maintenance  She has been getting colonoscopies. One of her past colonoscopies did show polyps. She had a mammogram around 01/2022 or 02/2022. She did have a breast biopsy when she was 40 years old. The patient has been walking for exercise. Her bowel movements are regular.       Patient Active Problem List   Diagnosis   • Anxiety   • Type 2 diabetes mellitus without complication, without long-term current use of insulin (HCC)   • Congenital heart defect   • Sleep apnea   • Leukopenia   • Hypothyroidism   • Fatigue        Past Medical History:   Diagnosis Date   • Anxiety    • Asthma     Mild    • Bundle branch block    • Circulation disorder of lower extremity     legs   • Diabetes mellitus (HCC)    • Hypothyroid    • Sleep apnea        Past Surgical History:    Procedure Laterality Date   • BLADDER SURGERY  1973   • CARPAL TUNNEL RELEASE  2018    right    • KIDNEY STONE SURGERY  1994   • KNEE SURGERY  2019    meniscus repair- left knee   • NASAL POLYP SURGERY  2006   • NASAL SEPTUM SURGERY  1999   • REPLACEMENT TOTAL HIP LATERAL POSITION  10/02/2020   • TONSILLECTOMY AND ADENOIDECTOMY     • TUBAL ABDOMINAL LIGATION     • VARICOSE VEIN SURGERY  2006    Right leg        Family History   Problem Relation Age of Onset   • Heart disease Mother    • Cancer Mother    • Colon cancer Mother    • Arthritis Mother    • Angina Mother    • Osteoporosis Mother    • Hyperlipidemia Mother    • Stroke Father    • Colon cancer Father    • Heart attack Father    • Lung cancer Father    • Cancer Sister    • Migraines Sister    • Cancer Sister    • Cancer Brother    • Arthritis Brother    • Hypertension Brother    • Cancer Brother    • Diabetes Brother        Social History     Socioeconomic History   • Marital status:    Tobacco Use   • Smoking status: Never Smoker   • Smokeless tobacco: Never Used   Substance and Sexual Activity   • Alcohol use: Never   • Drug use: Never   • Sexual activity: Defer       No Known Allergies    Review of Systems   Constitutional: Positive for fatigue. Negative for activity change, appetite change, diaphoresis, unexpected weight gain and unexpected weight loss.   HENT: Negative for hearing loss.    Eyes: Negative for visual disturbance.   Respiratory: Negative for chest tightness and shortness of breath.    Cardiovascular: Negative for chest pain, palpitations and leg swelling.   Gastrointestinal: Negative for abdominal pain, blood in stool, GERD and indigestion.   Endocrine: Negative for cold intolerance and heat intolerance.   Genitourinary: Negative for dysuria and hematuria.   Musculoskeletal: Negative for arthralgias and myalgias.   Skin: Negative for skin lesions.   Neurological: Negative for tremors, seizures, syncope, speech difficulty,  "weakness, headache, memory problem and confusion.   Hematological: Does not bruise/bleed easily.   Psychiatric/Behavioral: Negative for sleep disturbance and depressed mood. The patient is not nervous/anxious.         Vital Signs  Vitals:    06/17/22 1349   BP: 114/78   BP Location: Right arm   Patient Position: Sitting   Cuff Size: Adult   Pulse: 77   Temp: 98.2 °F (36.8 °C)   TempSrc: Infrared   SpO2: 95%   Weight: 86 kg (189 lb 9.6 oz)   Height: 160 cm (62.99\")   PainSc: 0-No pain     Body mass index is 33.6 kg/m².  BMI is >= 30 and <35. (Class 1 Obesity). The following options were offered after discussion;: nutrition counseling/recommendations     Advance Care Planning   ACP discussion was held with the patient during this visit. Patient does not have an advance directive, information provided.       Current Outpatient Medications:   •  escitalopram (LEXAPRO) 10 MG tablet, Take 10 mg by mouth Daily., Disp: , Rfl:   •  levothyroxine sodium (TIROSINT) 75 MCG capsule, Take 75 mcg by mouth Daily., Disp: , Rfl:   •  metFORMIN (GLUCOPHAGE) 500 MG tablet, Take 500 mg by mouth Daily With Breakfast., Disp: , Rfl:   •  Omega-3 Fatty Acids (OMEGA-3 2100 PO), Take 1 capsule by mouth Daily., Disp: , Rfl:   •  VITAMIN D PO, Take 140 mg by mouth Daily., Disp: , Rfl:   •  vitamin E 400 UNIT capsule, Take 400 Units by mouth Daily., Disp: , Rfl:     Physical Exam  Vitals reviewed.   Constitutional:       Appearance: Normal appearance. She is well-developed.   HENT:      Head: Normocephalic and atraumatic.      Right Ear: Hearing, tympanic membrane, ear canal and external ear normal.      Left Ear: Hearing, tympanic membrane, ear canal and external ear normal.      Nose: Nose normal.      Mouth/Throat:      Mouth: Mucous membranes are moist.      Pharynx: Oropharynx is clear. Uvula midline. No posterior oropharyngeal erythema.   Eyes:      General: Lids are normal.      Conjunctiva/sclera: Conjunctivae normal.      Pupils: Pupils " are equal, round, and reactive to light.   Cardiovascular:      Rate and Rhythm: Normal rate and regular rhythm.      Heart sounds: Normal heart sounds.   Pulmonary:      Effort: Pulmonary effort is normal.      Breath sounds: Normal breath sounds.   Abdominal:      General: Bowel sounds are normal.      Palpations: Abdomen is soft.      Tenderness: There is no right CVA tenderness or left CVA tenderness.   Musculoskeletal:         General: Normal range of motion.      Cervical back: Full passive range of motion without pain, normal range of motion and neck supple.   Skin:     General: Skin is warm and dry.   Neurological:      General: No focal deficit present.      Mental Status: She is alert and oriented to person, place, and time. Mental status is at baseline.      Deep Tendon Reflexes: Reflexes are normal and symmetric.   Psychiatric:         Mood and Affect: Mood normal.         Speech: Speech normal.         Behavior: Behavior normal.         Thought Content: Thought content normal.         Judgment: Judgment normal.          ACE III MINI            Results Review:    Recent Results (from the past 672 hour(s))   Adult Transthoracic Echo Complete W/ Cont if Necessary Per Protocol    Collection Time: 06/09/22  2:52 PM   Result Value Ref Range    Target HR (85%) 132 bpm    Max. Pred. HR (100%) 155 bpm    BH CV VAS BP RIGHT /62 mmHg    RV S' 11.70 cm/sec    RV Base 3.30 cm    RV Length 5.50 cm    RV Mid 2.70 cm    Ascending aorta 3.5 cm    IVRT 77.0 msec    LA ESV Index (BP) 23.8 ml/m2    Avg E/e' ratio 8.63     Ao root diam 3.3 cm    Ao pk trenton 186.0 cm/sec    Ao V2 VTI 36.4 cm    MUSA(I,D) 2.06 cm2    EDV(cubed) 35.3 ml    EDV(MOD-sp2) 59.0 ml    EDV(MOD-sp4) 58.0 ml    EF(MOD-bp) 64.0 %    EF(MOD-sp2) 64.4 %    EF(MOD-sp4) 65.5 %    ESV(cubed) 8.9 ml    ESV(MOD-sp2) 21.0 ml    ESV(MOD-sp4) 20.0 ml    IVS/LVPW 1.66 cm    Lat Peak E' [place] 8.8 cm/sec    LV mass(C)d 210.4 grams    LV V1 max PG 10.2 mmHg    LV  V1 mean PG 6.0 mmHg    LV V1 max 160.0 cm/sec    LVPWd 1.25 cm    Med Peak E' [place] 4.80 cm/sec    MV dec slope 297.0 cm/sec2    MV dec time 0.20 msec    MV P1/2t 79.0 msec    PA acc slope 691.5 cm/sec2    PA acc time 0.13 sec    PA pr(Accel) 21.2 mmHg    PI end-d trent[place] 81.4 cm/sec    RF(MV,LVOT) 14     RF(MV,LVOT)(1diam) 130 cm    SV(LVOT) 75.1 ml    SV(MOD-sp2) 38.0 ml    SV(MOD-sp4) 38.0 ml    TR max PG 18.8 mmHg    Ao max PG 13.8 mmHg    Ao mean PG 7.0 mmHg    FS 36.9 %    IVSd 2.07 cm    LA dimension (2D)  3.8 cm    LV V1 VTI 29.5 cm    LVIDd 3.3 cm    LVIDs 2.07 cm    LVOT area 2.5 cm2    LVOT diam 1.80 cm    MV E/A 0.87     MVA(P1/2t) 2.8 cm2    MV A max trenton 67.1 cm/sec    MV E max trent[place] 58.7 cm/sec    TR max t[place] 217.0 cm/sec   CBC & Differential    Collection Time: 06/17/22  3:33 PM    Specimen: Blood    Blood  Manual Differen   Result Value Ref Range    WBC 3.67 3.40 - 10.80 10*3/mm3    RBC 4.29 3.77 - 5.28 10*6/mm3    Hemoglobin 13.9 12.0 - 15.9 g/dL    Hematocrit 38.2 34.0 - 46.6 %    MCV 89.0 79.0 - 97.0 fL    MCH 32.4 26.6 - 33.0 pg    MCHC 36.4 (H) 31.5 - 35.7 g/dL    RDW 13.7 12.3 - 15.4 %    Platelets 118 (L) 140 - 450 10*3/mm3    Neutrophil Rel % 51.3 42.7 - 76.0 %    Lymphocyte Rel % 38.1 19.6 - 45.3 %    Monocyte Rel % 5.7 5.0 - 12.0 %    Eosinophil Rel % 4.4 0.3 - 6.2 %    Basophil Rel % 0.5 0.0 - 1.5 %    Neutrophils Absolute 1.88 1.70 - 7.00 10*3/mm3    Lymphocytes Absolute 1.40 0.70 - 3.10 10*3/mm3    Monocytes Absolute 0.21 0.10 - 0.90 10*3/mm3    Eosinophils Absolute 0.16 0.00 - 0.40 10*3/mm3    Basophils Absolute 0.02 0.00 - 0.20 10*3/mm3    Immature Granulocyte Rel % 0.0 0.0 - 0.5 %    Immature Grans Absolute 0.00 0.00 - 0.05 10*3/mm3    nRBC 0.0 0.0 - 0.2 /100 WBC   Comprehensive Metabolic Panel    Collection Time: 06/17/22  3:33 PM    Specimen: Blood    Blood  Manual Differen   Result Value Ref Range    Glucose 93 65 - 99 mg/dL    BUN 14 8 - 23 mg/dL    Creatinine 0.79 0.57 - 1.00 mg/dL     EGFR Result 83.1 >60.0 mL/min/1.73    BUN/Creatinine Ratio 17.7 7.0 - 25.0    Sodium 141 136 - 145 mmol/L    Potassium 4.5 3.5 - 5.2 mmol/L    Chloride 105 98 - 107 mmol/L    Total CO2 25.1 22.0 - 29.0 mmol/L    Calcium 9.8 8.6 - 10.5 mg/dL    Total Protein 8.0 6.0 - 8.5 g/dL    Albumin 4.30 3.50 - 5.20 g/dL    Globulin 3.7 gm/dL    A/G Ratio 1.2 g/dL    Total Bilirubin 1.3 (H) 0.0 - 1.2 mg/dL    Alkaline Phosphatase 62 39 - 117 U/L    AST (SGOT) 50 (H) 1 - 32 U/L    ALT (SGPT) 38 (H) 1 - 33 U/L   TSH    Collection Time: 06/17/22  3:33 PM    Specimen: Blood    Blood  Manual Differen   Result Value Ref Range    TSH 1.920 0.270 - 4.200 uIU/mL   T4, Free    Collection Time: 06/17/22  3:33 PM    Specimen: Blood    Blood  Manual Differen   Result Value Ref Range    Free T4 1.03 0.93 - 1.70 ng/dL   Lipid Panel    Collection Time: 06/17/22  3:33 PM    Specimen: Blood    Blood  Manual Differen   Result Value Ref Range    Total Cholesterol 198 0 - 200 mg/dL    Triglycerides 153 (H) 0 - 150 mg/dL    HDL Cholesterol 53 40 - 60 mg/dL    VLDL Cholesterol Venkata 27 5 - 40 mg/dL    LDL Chol Calc (NIH) 118 (H) 0 - 100 mg/dL   Vitamin B12    Collection Time: 06/17/22  3:33 PM    Specimen: Blood    Blood  Manual Differen   Result Value Ref Range    Vitamin B-12 855 211 - 946 pg/mL   Hemoglobin A1c    Collection Time: 06/17/22  3:33 PM    Specimen: Blood    Blood  Manual Differen   Result Value Ref Range    Hemoglobin A1C 5.60 4.80 - 5.60 %   Vitamin D 25 Hydroxy    Collection Time: 06/17/22  3:33 PM    Specimen: Blood    Blood  Manual Differen   Result Value Ref Range    25 Hydroxy, Vitamin D 140.0 (H) 30.0 - 100.0 ng/ml   T3, Free    Collection Time: 06/17/22  3:33 PM    Specimen: Blood    Blood  Manual Differen   Result Value Ref Range    T3, Free 3.0 2.0 - 4.4 pg/mL     Procedures    Medication Review: Medications reviewed and noted    Social History     Socioeconomic History   • Marital status:    Tobacco Use   • Smoking  status: Never Smoker   • Smokeless tobacco: Never Used   Substance and Sexual Activity   • Alcohol use: Never   • Drug use: Never   • Sexual activity: Defer        Assessment/Plan:    Problem List Items Addressed This Visit        Cardiac and Vasculature    Congenital heart defect    Overview     Follows up with cardiology. Echo in May.              Endocrine and Metabolic    Type 2 diabetes mellitus without complication, without long-term current use of insulin (HCC) - Primary    Overview     Currently taking metformin 500mg daily.           Current Assessment & Plan     We will do a full blood work-up on her for monitoring.              Relevant Medications    metFORMIN (GLUCOPHAGE) 500 MG tablet    Other Relevant Orders    Hemoglobin A1c (Completed)    Hypothyroidism    Overview     Currently taking levothyroxine 75mg daily. Labs today.            Relevant Medications    levothyroxine sodium (TIROSINT) 75 MCG capsule    Other Relevant Orders    TSH (Completed)    T4, Free (Completed)    T3, Free (Completed)       Hematology and Neoplasia    Leukopenia    Overview     Currently follows up with hematology [facility] in [place]. Bone marrow biopsy scheduled in one July.            Relevant Orders    CBC & Differential (Completed)       Mental Health    Anxiety    Overview     Currently taking Lexapro 10mg daily which controls this well.              Sleep    Sleep apnea       Symptoms and Signs    Fatigue      Other Visit Diagnoses     Annual physical exam        Relevant Orders    CBC & Differential (Completed)    Comprehensive Metabolic Panel (Completed)    Encounter for vitamin deficiency screening        Relevant Orders    Vitamin B12 (Completed)    Vitamin D 25 Hydroxy (Completed)    Lipid screening        Relevant Orders    Lipid Panel (Completed)    Vitamin D deficiency        Relevant Orders    Vitamin D 25 Hydroxy (Completed)           Patient Instructions   BMI for Adults  What is  "BMI?  Body mass index (BMI) is a number that is calculated from a person's weight and height. BMI can help estimate how much of a person's weight is composed of fat. BMI does not measure body fat directly. Rather, it is an alternative to procedures that directly measure body fat, which can be difficult and expensive.  BMI can help identify people who may be at higher risk for certain medical problems.  What are BMI measurements used for?  BMI is used as a screening tool to identify possible weight problems. It helps determine whether a person is obese, overweight, a healthy weight, or underweight.  BMI is useful for:  · Identifying a weight problem that may be related to a medical condition or may increase the risk for medical problems.  · Promoting changes, such as changes in diet and exercise, to help reach a healthy weight. BMI screening can be repeated to see if these changes are working.  How is BMI calculated?  BMI involves measuring your weight in relation to your height. Both height and weight are measured, and the BMI is calculated from those numbers. This can be done either in English (U.S[place] or metric measurements. Note that charts and online BMI calculators are available to help you find your BMI quickly and easily without having to do these calculations yourself.  To calculate your BMI in English (U.S.) measurements:    1. Measure your weight in pounds (lb).  2. Multiply the number of pounds by 703.  ? For example, for a person who weighs 180 lb, multiply that number by 703, which equals 126,540.  3. Measure your height in inches. Then multiply that number by itself to get a measurement called \"inches squared.\"  ? For example, for a person who is 70 inches tall, the \"inches squared\" measurement is 70 inches x 70 inches, which equals 4,900 inches squared.  4. Divide the total from step 2 (number of lb x 703) by the total from step 3 (inches squared): 126,540 ÷ 4,900 = 25.8. This is your BMI.    To " "calculate your BMI in metric measurements:  1. Measure your weight in kilograms (kg).  2. Measure your height in meters (m). Then multiply that number by itself to get a measurement called \"meters squared.\"  ? For example, for a person who is 1.75 m tall, the \"meters squared\" measurement is 1.75 m x 1.75 m, which is equal to 3.1 meters squared.  3. Divide the number of kilograms (your weight) by the meters squared number. In this example: 70 ÷ 3.1 = 22.6. This is your BMI.  What do the results mean?  BMI charts are used to identify whether you are underweight, normal weight, overweight, or obese. The following guidelines will be used:  · Underweight: BMI less than 18.5.  · Normal weight: BMI between 18.5 and 24.9.  · Overweight: BMI between 25 and 29.9.  · Obese: BMI of 30 or above.  Keep these notes in mind:  · Weight includes both fat and muscle, so someone with a muscular build, such as an athlete, may have a BMI that is higher than 24.9. In cases like these, BMI is not an accurate measure of body fat.  · To determine if excess body fat is the cause of a BMI of 25 or higher, further assessments may need to be done by a health care provider.  · BMI is usually interpreted in the same way for men and women.  Where to find more information  For more information about BMI, including tools to quickly calculate your BMI, go to these websites:  · Centers for Disease Control and Prevention: www.cdc.gov  · American Heart Association: www.heart.org  · National Heart, Lung, and Blood [place]: www.nhlbi.nih.gov  Summary  · Body mass index (BMI) is a number that is calculated from a person's weight and height.  · BMI may help estimate how much of a person's weight is composed of fat. BMI can help identify those who may be at higher risk for certain medical problems.  · BMI can be measured using English measurements or metric measurements.  · BMI charts are used to identify whether you are underweight, normal weight, " "overweight, or obese.  This information is not intended to replace advice given to you by your health care provider. Make sure you discuss any questions you have with your health care provider.  Document Revised: 09/09/2020 Document Reviewed: 07/17/2020  [employer] Patient Education © 2021 [employer]  Critical care medicine: Principles of diagnosis and management in the adult (4th ed., pp. 6235-9636). Jorgensen.\"> Brown's anesthesia (8th ed., pp. 232-250). Jorgensen.\">   Advance Directive    Advance directives are legal documents that allow you to make decisions about your health care and medical treatment in case you become unable to communicate for yourself. Advance directives let your wishes be known to family, friends, and health care providers.  Discussing and writing advance directives should happen over time rather than all at once. Advance directives can be changed and updated at any time. There are different types of advance directives, such as:  · Medical power of .  · Living will.  · Do not resuscitate (DNR) order or do not attempt resuscitation (DNAR) order.  Health care proxy and medical power of   A health care proxy is also called a health care agent. This person is appointed to make medical decisions for you when you are unable to make decisions for yourself. Generally, people ask a trusted friend or family member to act as their proxy and represent their preferences. Make sure you have an agreement with your trusted person to act as your proxy. A proxy may have to make a medical decision on your behalf if your wishes are not known.  A medical power of , also called a durable power of  for health care, is a legal document that names your health care proxy. Depending on the laws in your state, the document may need to be:  · Signed.  · Notarized.  · Dated.  · Copied.  · Witnessed.  · Incorporated into your medical record.  You may also want to appoint a trusted person " to manage your money in the event you are unable to do so. This is called a durable power of  for finances. It is a separate legal document from the durable power of  for health care. You may choose your health care proxy or someone different to act as your agent in money matters.  If you do not appoint a proxy, or there is a concern that the proxy is not acting in your best interest, a court may appoint a guardian to act on your behalf.  Living will  A living will is a set of instructions that state your wishes about medical care when you cannot express them yourself. Health care providers should keep a copy of your living will in your medical record. You may want to give a copy to family members or friends. To alert caregivers in case of an emergency, you can place a card in your wallet to let them know that you have a living will and where they can find it. A living will is used if you become:  · Terminally ill.  · Disabled.  · Unable to communicate or make decisions.  The following decisions should be included in your living will:  · To use or not to use life support equipment, such as dialysis machines and breathing machines (ventilators).  · Whether you want a DNR or DNAR order. This tells health care providers not to use cardiopulmonary resuscitation (CPR) if breathing or heartbeat stops.  · To use or not to use tube feeding.  · To be given or not to be given food and fluids.  · Whether you want comfort (palliative) care when the goal becomes comfort rather than a cure.  · Whether you want to donate your organs and tissues.  A living will does not give instructions for distributing your money and property if you should pass away.  DNR or DNAR  A DNR or DNAR order is a request not to have CPR in the event that your heart stops beating or you stop breathing. If a DNR or DNAR order has not been made and shared, a health care provider will try to help any patient whose heart has stopped or who has  stopped breathing. If you plan to have surgery, talk with your health care provider about how your DNR or DNAR order will be followed if problems occur.  What if I do not have an advance directive?  Some states assign family decision makers to act on your behalf if you do not have an advance directive. Each state has its own laws about advance directives. You may want to check with your health care provider, , or state representative about the laws in your state.  Summary  · Advance directives are legal documents that allow you to make decisions about your health care and medical treatment in case you become unable to communicate for yourself.  · The process of discussing and writing advance directives should happen over time. You can change and update advance directives at any time.  · Advance directives may include a medical power of , a living will, and a DNR or DNAR order.  This information is not intended to replace advice given to you by your health care provider. Make sure you discuss any questions you have with your health care provider.  Document Revised: 09/21/2021 Document Reviewed: 09/21/2021  [employer] Patient Education © 2021 [employer]         Plan of care reviewed with patient at the conclusion of today's visit. Education was provided regarding diagnosis, management, and any prescribed or recommended OTC medications.Patient verbalizes understanding of and agreement with management plan.       I spent 45 minutes caring for Justine on this date of service. This time includes time spent by me in the following activities:preparing for the visit, reviewing tests, obtaining and/or reviewing a separately obtained history, performing a medically appropriate examination and/or evaluation , counseling and educating the patient/family/caregiver, ordering medications, tests, or procedures and documenting information in the medical record    Radha Cooney PA-C      Note: Part of this note  may be an electronic transcription/translation of spoken language to printed text using the Dragon Dictation system.  Transcribed from ambient dictation for Rahda Cooney PA-C by Selma Espitia.  06/17/22   16:34 EDT    Patient verbalized consent to the visit recording.

## 2022-06-20 NOTE — CONFIDENTIAL NOTE
At the request of Lucie Chan, RNCC, writer printed 2 ROIs and 2 PHIs (partially completed) and placed in outgoing mail basket to be delivered to patient's home address. Mercy Gonzalez, EMT

## 2022-06-22 ENCOUNTER — VIRTUAL VISIT (OUTPATIENT)
Dept: PSYCHIATRY | Facility: CLINIC | Age: 32
End: 2022-06-22
Attending: NURSE PRACTITIONER
Payer: COMMERCIAL

## 2022-06-22 DIAGNOSIS — F51.05 INSOMNIA DUE TO OTHER MENTAL DISORDER: ICD-10-CM

## 2022-06-22 DIAGNOSIS — F15.21 AMPHETAMINE USE DISORDER, MODERATE, IN EARLY REMISSION (H): ICD-10-CM

## 2022-06-22 DIAGNOSIS — F99 INSOMNIA DUE TO OTHER MENTAL DISORDER: ICD-10-CM

## 2022-06-22 DIAGNOSIS — F25.0 SCHIZOAFFECTIVE DISORDER, BIPOLAR TYPE (H): ICD-10-CM

## 2022-06-22 DIAGNOSIS — F11.21 OPIOID USE DISORDER, MODERATE, IN EARLY REMISSION (H): ICD-10-CM

## 2022-06-22 DIAGNOSIS — F12.20 CANNABIS USE DISORDER, SEVERE, DEPENDENCE (H): ICD-10-CM

## 2022-06-22 DIAGNOSIS — F43.10 PTSD (POST-TRAUMATIC STRESS DISORDER): ICD-10-CM

## 2022-06-22 DIAGNOSIS — F41.9 ANXIETY: Primary | ICD-10-CM

## 2022-06-22 DIAGNOSIS — F16.21: ICD-10-CM

## 2022-06-22 PROCEDURE — 99214 OFFICE O/P EST MOD 30 MIN: CPT | Mod: 95 | Performed by: NURSE PRACTITIONER

## 2022-06-22 RX ORDER — OLANZAPINE 20 MG/1
20 TABLET ORAL AT BEDTIME
Qty: 30 TABLET | Refills: 1 | Status: SHIPPED | OUTPATIENT
Start: 2022-06-22 | End: 2022-07-07

## 2022-06-22 RX ORDER — OLANZAPINE 20 MG/1
TABLET ORAL
Qty: 30 TABLET | Refills: 1 | Status: SHIPPED | OUTPATIENT
Start: 2022-06-22 | End: 2022-07-07

## 2022-06-22 NOTE — NURSING NOTE
No change to medications since the last visit per patient.    QNRS were not assigned, PHQ was not done per department protocol.    Violetta Driscoll VF

## 2022-06-22 NOTE — TELEPHONE ENCOUNTER
Central Prior Authorization Team   Phone: [phone]      Prior Authorization Approval    Authorization Effective Date: 3/1/2022  Authorization Expiration Date: 6/18/2023  Medication: testosterone cypionate (DEPOTESTOSTERONE) 200 MG/ML injection--APPROVED  Approved Dose/Quantity:    Reference #:     Insurance Company: [employer] Part D - Phone [phone] Fax [phone]  Expected CoPay:       CoPay Card Available:      Foundation Assistance Needed:    Which Pharmacy is filling the prescription (Not needed for infusion/clinic administered): [facility] [place] [place] [id number] - [place], [place] - [address] [place] [address] [address]  Pharmacy Notified: Yes  Patient Notified: Yes PHARMACY WILL CONTACT WHEN FILLED

## 2022-06-22 NOTE — PATIENT INSTRUCTIONS
-Start Prolixin 1 mg at bedtime for your anxiety and sleep.  -Stop Seroquel 50 mg.  -Continue Zyprexa 20 mg daily for now.  -Continue lithium and may also use Hydroxyzine.    Your next appointment is scheduled on 7/7/2022 (Thu) 1:30pm.'    **For crisis resources, please see the information at the end of this document**   Patient Education    Thank you for coming to the [facility] [place] [facility].     Lab Testing:  If you had lab testing today and your results are reassuring or normal they will be mailed to you or sent through [employer] within 7 days. If the lab tests need quick action we will call you with the results. The phone number we will call with results is # [phone]. If this is not the best number please call our clinic and change the number.     Medication Refills:  If you need any refills please call your pharmacy and they will contact us. Our fax number for refills is [phone].   Three business days of notice are needed for general medication refill requests.   Five business days of notice are needed for controlled substance refill requests.   If you need to change to a different pharmacy, please contact the new pharmacy directly. The new pharmacy will help you get your medications transferred.     Contact Us:  Please call [phone] during business hours (8-5:00 M-F).   If you have medication related questions after clinic hours, or on the weekend, please call [phone].     Financial Assistance [phone]   Medical Records [phone]       MENTAL HEALTH CRISIS RESOURCES:  For a emergency help, please call 911 or go to the nearest Emergency Department.     Emergency Walk-In Options:   [facility] Unit @ [facility] ([place]): [phone] - Specialized mental health emergency area designed to be calming  [facility] ([place]): [phone]  [facility] ([place]): [phone]  [facility]  [place]): [phone]    [facility] Crisis Information:   [place]: [phone]  [place]: [phone]  [place] ([place]) - Adult: [phone]     Child: [phone]  [place] - Adult: [phone]     Child: [phone]  [place]: [phone]  List of all [facility] resources:   [email or web address]/dhs/people-[email or web address]-care/mental-health/resources/crisis-contacts.jsp    National Crisis Information:   Crisis Text Line: Text  [place]  to 560186  National Suicide Prevention Lifeline: 2-401-065-ACIJ (1-913.909.2069)       For online chat options, visit https://suicidepreventionlifeline.org/chat/  Poison Control Center: 1-724.202.6448  Trans Lifeline: 1-537.683.1073 - Hotline for transgender people of all ages  The [employer] Project: 9-235-989-3396 - Hotline for LGBT youth     For Non-Emergency Support:   Fast Tracker: Mental Health & Substance Use Disorder Resources -   https://www.[email or web address]„[employer] Medicaln.org/
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
## 2022-07-03 ENCOUNTER — HEALTH MAINTENANCE LETTER (OUTPATIENT)
Age: 32
End: 2022-07-03

## 2022-07-07 ENCOUNTER — VIRTUAL VISIT (OUTPATIENT)
Dept: PSYCHIATRY | Facility: CLINIC | Age: 32
End: 2022-07-07
Attending: NURSE PRACTITIONER
Payer: COMMERCIAL

## 2022-07-07 DIAGNOSIS — F99 INSOMNIA DUE TO OTHER MENTAL DISORDER: ICD-10-CM

## 2022-07-07 DIAGNOSIS — F51.05 INSOMNIA DUE TO OTHER MENTAL DISORDER: ICD-10-CM

## 2022-07-07 DIAGNOSIS — F11.21 OPIOID USE DISORDER, MODERATE, IN EARLY REMISSION (H): ICD-10-CM

## 2022-07-07 DIAGNOSIS — F12.20 CANNABIS USE DISORDER, SEVERE, DEPENDENCE (H): ICD-10-CM

## 2022-07-07 DIAGNOSIS — F25.0 SCHIZOAFFECTIVE DISORDER, BIPOLAR TYPE (H): ICD-10-CM

## 2022-07-07 DIAGNOSIS — F16.21: ICD-10-CM

## 2022-07-07 DIAGNOSIS — F41.9 ANXIETY: Primary | ICD-10-CM

## 2022-07-07 DIAGNOSIS — F43.10 PTSD (POST-TRAUMATIC STRESS DISORDER): ICD-10-CM

## 2022-07-07 DIAGNOSIS — F15.21 AMPHETAMINE USE DISORDER, MODERATE, IN EARLY REMISSION (H): ICD-10-CM

## 2022-07-07 PROCEDURE — 99215 OFFICE O/P EST HI 40 MIN: CPT | Mod: GT | Performed by: NURSE PRACTITIONER

## 2022-07-07 RX ORDER — OLANZAPINE 20 MG/1
20 TABLET ORAL AT BEDTIME
Qty: 30 TABLET | Refills: 1 | Status: SHIPPED | OUTPATIENT
Start: 2022-07-07 | End: 2022-08-24

## 2022-07-07 RX ORDER — FLUPHENAZINE HYDROCHLORIDE 1 MG/1
1 TABLET ORAL 2 TIMES DAILY
Qty: 60 TABLET | Refills: 1 | Status: SHIPPED | OUTPATIENT
Start: 2022-07-07 | End: 2022-08-12

## 2022-07-07 RX ORDER — LITHIUM CARBONATE 300 MG/1
900 TABLET, FILM COATED, EXTENDED RELEASE ORAL AT BEDTIME
Qty: 90 TABLET | Refills: 1 | Status: SHIPPED | OUTPATIENT
Start: 2022-07-07 | End: 2022-08-24

## 2022-07-07 ASSESSMENT — PATIENT HEALTH QUESTIONNAIRE - PHQ9
10. IF YOU CHECKED OFF ANY PROBLEMS, HOW DIFFICULT HAVE THESE PROBLEMS MADE IT FOR YOU TO DO YOUR WORK, TAKE CARE OF THINGS AT HOME, OR GET ALONG WITH OTHER PEOPLE: SOMEWHAT DIFFICULT
SUM OF ALL RESPONSES TO PHQ QUESTIONS 1-9: 5
SUM OF ALL RESPONSES TO PHQ QUESTIONS 1-9: 5

## 2022-07-07 NOTE — PATIENT INSTRUCTIONS
-Increase Prolixin to 1 mg 2 times a day for anxiety, mood and psychosis.  Monitor for abnormal muscle movement.  If this symptom occurs, please contact mattie immediately.  -Continue all other medications for now.    -Please monitor your blood pressure 2-3 times a week in next 2 weeks and report to mattie.  You may need to follow up with your primary care provider to restart on blood pressure medication.    **For crisis resources, please see the information at the end of this document**   Patient Education    Thank you for coming to the [facility] [place] [facility].     Lab Testing:  If you had lab testing today and your results are reassuring or normal they will be mailed to you or sent through [employer] within 7 days. If the lab tests need quick action we will call you with the results. The phone number we will call with results is # [phone]. If this is not the best number please call our clinic and change the number.     Medication Refills:  If you need any refills please call your pharmacy and they will contact us. Our fax number for refills is [phone].   Three business days of notice are needed for general medication refill requests.   Five business days of notice are needed for controlled substance refill requests.   If you need to change to a different pharmacy, please contact the new pharmacy directly. The new pharmacy will help you get your medications transferred.     Contact Us:  Please call [phone] during business hours (8-5:00 M-F).   If you have medication related questions after clinic hours, or on the weekend, please call [phone].     Financial Assistance [phone]   Medical Records [phone]       MENTAL HEALTH CRISIS RESOURCES:  For a emergency help, please call 911 or go to the nearest Emergency Department.     Emergency Walk-In Options:   Em[facility] Unit @ [place] [place] ([place]): [phone] - Specialized mental health emergency area  designed to be calming  [facility] ([place]): [phone]  [facility] ([place]): [phone]  [facility] ([place]): [phone]    [facility] Crisis Information:   [place]: [phone]  [place]: [phone]  [place] ([place]) - Adult: [phone]     Child: [phone]  [place] - Adult: [phone]     Child: [phone]  [place]: [phone]  List of all [facility] resources:   https://[place].gov/dhs/people-we-serve/adults/health-care/mental-health/resources/crisis-contacts.jsp    National Crisis Information:   Crisis Text Line: Text  [place]  to 623059  National Suicide Prevention Lifeline: 1-851-732-TALK (1-114.339.4500)       For online chat options, visit https://suicidepreventionlifeline.org/chat/  Poison Control Center: 1-328.661.3245  Trans Lifeline: 1-871.480.5089 - Hotline for transgender people of all ages  The [employer]: 1-919.887.3111 - Hotline for LGBT youth     For Non-Emergency Support:   Fast Tracker: Mental Health & Substance Use Disorder Resources -   https://www.[employer] [facility].org/
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
## 2022-07-07 NOTE — PROGRESS NOTES
Ayana Prasad is a 31 year old who has consented to receive services via billable video visit.      Pt will join video visit via: [employer]  If there are problems joining the visit, send backup video invite via: Send to preferred e-mail: [email or web address]      Originating Location (patient location): Patient's home  Distant Location (provider location): [facility] [place] [facility]    Will anyone else be joining the video visit? Yes:   . How would they like to receive their invitation? Other e-mail:   [email or web address]    How would you prefer to obtain AVS?: Regina

## 2022-07-08 ENCOUNTER — DOCUMENTATION ONLY (OUTPATIENT)
Dept: PLASTIC SURGERY | Facility: CLINIC | Age: 32
End: 2022-07-08

## 2022-07-08 DIAGNOSIS — F41.9 ANXIETY: Primary | ICD-10-CM

## 2022-07-08 RX ORDER — GABAPENTIN 300 MG/1
300 CAPSULE ORAL 3 TIMES DAILY
Qty: 90 CAPSULE | Refills: 1 | Status: SHIPPED | OUTPATIENT
Start: 2022-07-08 | End: 2022-08-11

## 2022-07-08 NOTE — PROGRESS NOTES
[facility] :  Care Coordination Note     SITUATION   Prakash Prasad is a 31 year old adult who is receiving support for:  Care Team  .    BACKGROUND     Reviewed LOS from 10/13/21- meets requirements. Praaksh will need a mammogram before surgery per office visit notes.     ASSESSMENT     Surgery              [facility] Assessment  [facility] ([facility]) Enrollment: Enrolled  Patient has a therapist: Yes  Name of therapist: Aleshia Henao  Letter of support #1: Received  Letter #1 Date: 10/13/21  Surgery being considered: Yes  Mastectomy: Yes          PLAN          Nursing Interventions:       Follow-up plan:  Prakash will need mammogram. LOS Meets requirements. RNCCs to request orders from Dr. Mayers per old protocol. Surgery scheduler to reach out to schedule.        CARLOS RASHID LPCC

## 2022-07-13 ENCOUNTER — E-VISIT (OUTPATIENT)
Dept: FAMILY MEDICINE | Facility: CLINIC | Age: 32
End: 2022-07-13
Payer: COMMERCIAL

## 2022-07-13 ENCOUNTER — MYC MEDICAL ADVICE (OUTPATIENT)
Dept: BEHAVIORAL HEALTH | Facility: CLINIC | Age: 32
End: 2022-07-13

## 2022-07-13 DIAGNOSIS — L81.9 DISCOLORATION OF SKIN: ICD-10-CM

## 2022-07-13 DIAGNOSIS — L02.419 ABSCESS OF LEG: Primary | ICD-10-CM

## 2022-07-13 DIAGNOSIS — R22.9 LOCALIZED SUPERFICIAL SWELLING, MASS, OR LUMP: ICD-10-CM

## 2022-07-13 PROCEDURE — 99421 OL DIG E/M SVC 5-10 MIN: CPT | Performed by: NURSE PRACTITIONER

## 2022-07-13 RX ORDER — SULFAMETHOXAZOLE/TRIMETHOPRIM 800-160 MG
1 TABLET ORAL 2 TIMES DAILY
Qty: 14 TABLET | Refills: 0 | Status: SHIPPED | OUTPATIENT
Start: 2022-07-13 | End: 2022-07-18

## 2022-07-13 NOTE — TELEPHONE ENCOUNTER
ERNIE RN received this image in the  [facility] basket.  TC RN phoned this patient who indicated he had sent this image in error to [facility] Provider Becki Sommers.  This patient apologized indicating his other medical Provider has the initials A.K.  Patient indicated that Provider Becki Sommers please disregard this image as it was intended for his PCP.  .

## 2022-07-14 ENCOUNTER — MYC MEDICAL ADVICE (OUTPATIENT)
Dept: FAMILY MEDICINE | Facility: CLINIC | Age: 32
End: 2022-07-14

## 2022-07-14 DIAGNOSIS — L81.9 DISCOLORATION OF SKIN: ICD-10-CM

## 2022-07-14 DIAGNOSIS — L02.419 ABSCESS OF LEG: Primary | ICD-10-CM

## 2022-07-14 DIAGNOSIS — R22.9 LOCALIZED SUPERFICIAL SWELLING, MASS, OR LUMP: ICD-10-CM

## 2022-07-15 RX ORDER — CEPHALEXIN 500 MG/1
500 CAPSULE ORAL 3 TIMES DAILY
Qty: 21 CAPSULE | Refills: 0 | Status: SHIPPED | OUTPATIENT
Start: 2022-07-15 | End: 2022-07-22

## 2022-07-18 ENCOUNTER — VIRTUAL VISIT (OUTPATIENT)
Dept: FAMILY MEDICINE | Facility: CLINIC | Age: 32
End: 2022-07-18
Payer: COMMERCIAL

## 2022-07-18 VITALS — WEIGHT: 230 LBS | BODY MASS INDEX: 36.02 KG/M2

## 2022-07-18 DIAGNOSIS — F19.951 SUBSTANCE-INDUCED PSYCHOTIC DISORDER WITH HALLUCINATIONS (H): ICD-10-CM

## 2022-07-18 DIAGNOSIS — F31.11 BIPOLAR 1 DISORDER, MANIC, MILD (H): ICD-10-CM

## 2022-07-18 DIAGNOSIS — F64.9 GENDER DYSPHORIA: Primary | ICD-10-CM

## 2022-07-18 DIAGNOSIS — F41.9 ANXIETY: ICD-10-CM

## 2022-07-18 PROCEDURE — 99214 OFFICE O/P EST MOD 30 MIN: CPT | Mod: 95

## 2022-07-18 NOTE — PROGRESS NOTES
Prakash is a 31 year old who is being evaluated via a billable video visit.      Assessment & Plan     Gender affirming care  Patient is currently taking approximately 20 mg of testosterone weekly for gender affirming care and would like to increase his testosterone dosage. I discussed with patient that uptitration of testosterone is appropriate only after having checked lab work including: CBC, lipid panel, and CMP. Getting labs before up titrating patients testosterone is particularly key given his other medications (which include zyprexa known to cause dyslipidemia and metabolic syndrome in certain patients).  - CBC with platelets; Future  - Lipid panel; Future  - CMP  - discussed plan to follow up in clinic in approximately one month to check mid-cycle total testosterone and determine whether further  titration is appropriate    - continue testosterone cypionate 20mg weekly    Substance Induced Psychotic Disorder with Hallucinations  Anxiety  Bipolar 1 Disorder  Patient feels that mental health is currently stable on his regimen and feels that testosterone supplementation is eating his mental health. To change patient's mental health regimen this time  - continue lithium 900mg QHS  - continue olanzapine 20mg  QHS  - continue gabapentin 300mg TID      Review of external notes as documented elsewhere in note  Review of the result(s) of each unique test - previous lab work piper past BMP and CBC  Ordering of each unique test  Prescription drug management    [id number]}     FUTURE LABS:       - Schedule a fasting blood draw: lipid, Hgb, glucose, LFT and midcycle testosterone level at next in person clinic appointment (this will be three month check of testosterone therapy after he get his one month check labs within the next week)  FUTURE APPOINTMENTS:       - Follow-up officevisit in    Return in about 4 weeks (around 8/15/2022) for titration of testosterone and follow-up.    Nita Bowie MD  [facility]  "LINETTE Randall is a 31 year old, presenting for the following health issues:  RECHECK (Pt is present today for testosterone follow up ) and Refill Request (Pt also needs refills on Testosterone medciation)    Patient started on 20mg of testosterone at clinic visit on 6/6/2020. Patient feels that this going well and appreciates that his voice has become deeper and that he has been growing some facial hair.    Previously, patient had been lost to follow-up for approximately one year. Although he hasbeen on testosterone intermittently since March 2021.     Patient is still taking his mental health medications including Zyprexa and gabapentin (for anxiety). Patient feels that overall his mental health has been stable for the past one month and feels quote better\" on testosterone. Patient denies depressive symptoms or suicidal intention or thoughts. Patient states that he continues to have auditory hallucinations but that these are at baseline and have been unchanged from previous.    Patient denies side effects from testosterone such as headache, chest pain, nausea, or vomiting. However, patient does share that he has severe acne for which he is going to go on to Accutane soon. Patient agreeable to getting more lab work before increasing testosterone dosage.    Patient would like to go up on his testosterone. Patient has no other health concerns or complaints today      HPI   Review of Systems   PSYCHIATRIC: NEGATIVE for changes in mood or affect, suicidal ideation/plan, or depression POSITIVE for anxiety and hallucinations (at baseline)  ROS otherwise negative except as documented in above HPI      Objective       Vitals:  No vitals were obtained today due to virtual visit.    Physical Exam   GENERAL: Healthy, alert and no distress  PSYCH: Mentation appears normal, judgement and insight intact, normal speech and appearance well-groomed.          Video-Visit Details    Video Start Time: 1:00 PM    Type " of service:  Video Visit    Video End Time: 1:34 PM    Originating Location (pt. Location): Home    Distant Location (provider location):  [facility]     Platform used for Video Visit: [employer]

## 2022-07-18 NOTE — PATIENT INSTRUCTIONS
You should get lab work (CMP, lipid panel and CBC) to check the health of your liver, kidneys, cholesterol level and blood counts prior to increasing your dose of testosterone (this is especially true because of the specific combination of medications you are already on for instance  zyprexa, lithium)  Once we get the results of these tests, if everything is fine we can go ahead and increase your testosterone level.  We would like to see you back in clinic in-person in about one month to discuss your testosterone level and to do an overall checkup.

## 2022-07-18 NOTE — PROGRESS NOTES
Preceptor Attestation:   Patient seen and evaluated via video visit. I have verified the content of the note, which accurately reflects my assessment of the patient and the plan of care.   Supervising Physician:  Isaac Linton MD.

## 2022-07-20 DIAGNOSIS — F64.0 GENDER DYSPHORIA IN ADOLESCENT AND ADULT: Primary | ICD-10-CM

## 2022-07-20 DIAGNOSIS — F90.2 ATTENTION DEFICIT HYPERACTIVITY DISORDER (ADHD), COMBINED TYPE: Primary | ICD-10-CM

## 2022-07-20 RX ORDER — CEFAZOLIN SODIUM 2 G/50ML
2 SOLUTION INTRAVENOUS SEE ADMIN INSTRUCTIONS
Status: CANCELLED | OUTPATIENT
Start: 2022-07-20

## 2022-07-20 RX ORDER — CEFAZOLIN SODIUM 2 G/50ML
2 SOLUTION INTRAVENOUS
Status: CANCELLED | OUTPATIENT
Start: 2022-07-20

## 2022-07-20 RX ORDER — DEXTROAMPHETAMINE SACCHARATE, AMPHETAMINE ASPARTATE MONOHYDRATE, DEXTROAMPHETAMINE SULFATE AND AMPHETAMINE SULFATE 1.25; 1.25; 1.25; 1.25 MG/1; MG/1; MG/1; MG/1
5 CAPSULE, EXTENDED RELEASE ORAL EVERY MORNING
Qty: 30 CAPSULE | Refills: 0 | Status: SHIPPED | OUTPATIENT
Start: 2022-07-20 | End: 2022-08-11

## 2022-07-20 RX ORDER — LISDEXAMFETAMINE DIMESYLATE 10 MG/1
10 CAPSULE ORAL EVERY MORNING
Qty: 30 CAPSULE | Refills: 0 | Status: SHIPPED | OUTPATIENT
Start: 2022-07-20 | End: 2022-07-20

## 2022-07-22 ENCOUNTER — OFFICE VISIT (OUTPATIENT)
Dept: PALLIATIVE MEDICINE | Facility: CLINIC | Age: 32
End: 2022-07-22
Attending: NURSE PRACTITIONER
Payer: COMMERCIAL

## 2022-07-22 VITALS — DIASTOLIC BLOOD PRESSURE: 96 MMHG | SYSTOLIC BLOOD PRESSURE: 139 MMHG | HEART RATE: 111 BPM

## 2022-07-22 DIAGNOSIS — M54.59 LUMBAR FACET JOINT PAIN: Primary | ICD-10-CM

## 2022-07-22 DIAGNOSIS — M62.838 MUSCLE SPASM: ICD-10-CM

## 2022-07-22 DIAGNOSIS — M54.9 INTRACTABLE BACK PAIN: ICD-10-CM

## 2022-07-22 DIAGNOSIS — G83.4 CAUDA EQUINA SYNDROME WITH NEUROGENIC BLADDER (H): ICD-10-CM

## 2022-07-22 DIAGNOSIS — M51.369 DDD (DEGENERATIVE DISC DISEASE), LUMBAR: ICD-10-CM

## 2022-07-22 DIAGNOSIS — M47.817 LUMBOSACRAL SPONDYLOSIS WITHOUT MYELOPATHY: ICD-10-CM

## 2022-07-22 DIAGNOSIS — M79.18 MYOFASCIAL PAIN: ICD-10-CM

## 2022-07-22 PROCEDURE — 99205 OFFICE O/P NEW HI 60 MIN: CPT | Performed by: NURSE PRACTITIONER

## 2022-07-22 RX ORDER — METAXALONE 800 MG/1
400-800 TABLET ORAL 3 TIMES DAILY PRN
Qty: 60 TABLET | Refills: 1 | Status: SHIPPED | OUTPATIENT
Start: 2022-07-22 | End: 2023-04-19

## 2022-07-22 RX ORDER — PHENOL 1.4 %
10 AEROSOL, SPRAY (ML) MUCOUS MEMBRANE
COMMUNITY
End: 2022-10-14

## 2022-07-22 ASSESSMENT — PAIN SCALES - GENERAL: PAINLEVEL: MODERATE PAIN (5)
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
## 2022-07-22 NOTE — PATIENT INSTRUCTIONS
Physical Therapy: ordered  Clinical Health Psychologist to address issues of relaxation, behavioral change, coping style, and other factors important to improvement: none  Diagnostic Studies: none  Medication Management:   Certified for medical cannabis today  Start metaxalone 400-800mg three times daily as needed for muscle spasms. Caution sedation.   I don't recommend opiate medication for your chronic pain  Further procedures recommended: schedule lumbar medial branch blocks proceeding to lumbar RFA, our office to contact you  Urine toxicology screen today: none  Recommendations/follow-up for PCP:  See above  Release of information: none  Follow up: 8-10 weeks after lumbar RFA.     -------------------------------------    Clinic Number:  [phone]   Call with any questions about your care and for scheduling assistance.   Calls are returned Monday through Friday between 8 AM and 4:30 PM. We usually get back to you within 2 business days depending on the issue/request.    If we are prescribing your medications:  For opioid medication refills, call the clinic or send a "[employer] message 7 days in advance.  Please include:  Name of requested medication  Name of the pharmacy.  For non-opioid medications, call your pharmacy directly to request a refill. Please allow 3-4 days to be processed.   Per [place] State Law:  All controlled substance prescriptions must be filled within 30 days of being written.    For those controlled substances allowing refills, pickup must occur within 30 days of last fill.      We believe regular attendance is key to your success in our program!    Any time you are unable to keep your appointment we ask that you call us at least 24 hours in advance to cancel.This will allow us to offer the appointment time to another patient.   Multiple missed appointments may lead to dismissal from the clinic.

## 2022-07-25 ENCOUNTER — TELEPHONE (OUTPATIENT)
Dept: PALLIATIVE MEDICINE | Facility: CLINIC | Age: 32
End: 2022-07-25

## 2022-07-25 NOTE — TELEPHONE ENCOUNTER
Prior Authorization Retail Medication Request    Medication/Dose: metaxalone (SKELAXIN) 800 MG tablet  ICD code (if different than what is on RX):    Previously Tried and Failed:    Rationale:      KEY: [id number]      Pharmacy Information     [address], [address]  [place] [address]  Phone: [phone] Fax: [phone]

## 2022-07-26 ENCOUNTER — TELEPHONE (OUTPATIENT)
Dept: PALLIATIVE MEDICINE | Facility: CLINIC | Age: 32
End: 2022-07-26

## 2022-07-26 NOTE — TELEPHONE ENCOUNTER
Central Prior Authorization Team  Phone: [phone]    PA Initiation    Medication: metaxalone (SKELAXIN) 800 MG tablet  Insurance Company: [employer] - Phone [phone] Fax [phone]  Pharmacy Filling the Rx: [facility] [place] [id number] - [place], [place] - [address], [address]  Filling Pharmacy Phone: [phone]  Filling Pharmacy Fax:    Start Date: 7/26/2022

## 2022-07-26 NOTE — TELEPHONE ENCOUNTER
Screening questions for MBB Injections:    Injection to be done at which interventional clinic site? [facility] [place]     Instruct patient to arrive as directed prior to the scheduled appointment time:    [place] and [place]: 30 minutes before      Procedure ordered by Dr. Luz Moncada     Procedure ordered?  left sided lumbar medial branch blocks, L3-4, L4-5 and L5-S1     What insurance would patient like us to bill for this procedure? UCARE & MEDICARE       MEDICA: REQUIRES A PA FOR BOTH MBB     Worker's comp- Any injection DO NOT SCHEDULE and route to Brenda Wade.      [employer] insurance - If scheduling an SI joint injection DO NOT SCHEDULE and route to Nancy Urbina.       MBBs must be scheduled with elapsed time interval of at least 2 weeks and not more than 6 months between the First MBB and the Second MBB for insurance purposes       [employer] - Any injection besides hip/shoulder/knee joint DO NOT SCHEDULE and route to Nancy Urbina. She will obtain PA and call pt back to schedule procedure or notify pt of denial.       [employer]- PA required for all MBB's      **BCBS- ALL need to be routed to Nancy for review if a PA is needed**    IF SCHEDULING IN [place] AND NEEDS A PA, IT IS OKAY TO SCHEDULE. [place] HANDLES THEIR OWN PA'S AFTER THE PATIENT IS SCHEDULED. PLEASE SCHEDULE AT LEAST 1 WEEK OUT SO A PA CAN BE OBTAINED.    Any chance of pregnancy? Not Applicable   If YES, do NOT schedule and route to RN pool    Is an  needed? No     Patient has a drive home? (mandatory) Yes     Is patient taking any blood thinners (plavix, coumadin, jantoven, warfarin, heparin, pradaxa or dabigatran )? No    If hold needed, do NOT schedule, route to RN pool     Is patient taking any aspirin products? No     If more than 325mg/day, OK to schedule; Instruct pt to decrease to less than 325 mg for 7 days AND route to RN pool    For CERVICAL procedures, hold all aspirin products for 6 days.      Tell pt that if aspirin product is not held for 6 days, the procedure WILL BE cancelled.      Does the patient have a bleeding or clotting disorder? No    If YES, okay to schedule AND route to RN nurse pool    **For any patients with platelet count <100, must be forwarded to provider**    Is patient diabetic? No If YES, have them bring their glucometer.    Does patient have an active infection or treated for one within the past week? Yes - Abscess on left leg     Is patient currently taking any antibiotics?  Yes - done taking ab on 7/24/22.    For patients on chronic, preventative, or prophylactic antibiotics, procedures may be scheduled.     For patients on antibiotics for active or recent infection:antibiotic course must have been completed for 4 days    Is patient currently taking any steroid medications? (i.e. Prednisone, Medrol)  No     For patients on steroid medications, course must have been completed for 4 days    Is patient actively being treated for cancer or immunocompromised? No   If YES, do NOT schedule and route to RN    Are you able to get on and off an exam table with minimal or no assistance? Yes   If NO, do NOT schedule and route to RN    Are you able to roll over and lay on your stomach with minimal or no assistance? Yes   If NO, do NOT schedule and route to RN    Any allergies to contrast dye, iodine, shellfish, or numbing and steroid medications? No  (If so, inform nursing and note in scheduling comments.)    Allergies: Haldol [haloperidol], Adhesive tape, Percocet [oxycodone-acetaminophen], Prednisone, Risperidone, Tramadol hcl, Droperidol, and Seroquel [quetiapine]     Does patient have an MRI/CT?  YES: 01/29/2021  Check Procedure Scheduling Grid to see if required.      Was the MRI done within the last 3 years?  Yes    If yes, where was the MRI done i.e.[facility], [facility], [place], [place] [facility] etc? [facility]      If no, do not schedule and route to  nursing    If MRI was not done at [place], [facility] or [facility] do NOT schedule and route to nursing.      If pt has an imaging disc, the injection MAY be scheduled but pt has to bring disc to appt.     If they show up without the disc the injection cannot be done      Medial Branch Block Pre-Procedure Instructions    It is okay to take long acting pain medications (if you are on them) the day of the procedure but try not to take any short acting medications unless absolutely necessary.    YES: Informed   Long acting meds would include: Gabapentin (Neurontin), MS Contin, Oxycontin        Short acting meds would include:  Percocet, Oxycodone, Vicodin, Ibuprofen     The day of the procedure, you should try to do things that provoke your pain, since the injection is being done to see if it will relieve your pain . YES: Informed      If your pain level is a 4 out of 10 or less on the day of the procedu re, please call [phone] to reschedule.  YES: Informed      Reminders:      If you are started on any steroids or antibiotics between now and your appointment, you must contact us because it may affect our ability to perform your procedure - Informed       Instructed pt to arrive 30 minutes early for IV start if required. (Check Procedure Scheduling Grid) INot Applicable       If this is for a cervical MBB aspirin needs to be held for 6 days.  Not Applicable       Do not schedule procedures requiring IV placement in the first appointment of the day or first appointment after lunch. Do NOT schedule at 0745, 0815 or 1245.        For patients 85 or older we recommend having an adult stay w/ them for the remainder of the day.      Does the patient have any questions? No     Pricila Dean      [facility]  Pain Management

## 2022-07-27 NOTE — TELEPHONE ENCOUNTER
Prior Authorization Approval    Authorization Effective Date: 3/1/2022  Authorization Expiration Date: 7/26/2023  Medication: metaxalone (SKELAXIN) 800 MG tablet- APPROVED   Approved Dose/Quantity:   Reference #:     Insurance Company: [employer] - Phone [phone] Fax [phone]  Expected CoPay:       CoPay Card Available:      Foundation Assistance Needed:    Which Pharmacy is filling the prescription (Not needed for infusion/clinic administered): [facility] [place] [id number] - [place], [place] - [address] [place] [address], [address]  Pharmacy Notified: Yes  Patient Notified: **Instructed pharmacy to notify patient when script is ready to /ship.**

## 2022-08-08 ENCOUNTER — VIRTUAL VISIT (OUTPATIENT)
Dept: DERMATOLOGY | Facility: CLINIC | Age: 32
End: 2022-08-08
Payer: COMMERCIAL

## 2022-08-08 DIAGNOSIS — L70.0 ACNE VULGARIS: Primary | ICD-10-CM

## 2022-08-08 PROCEDURE — 99441 PR PHYSICIAN TELEPHONE EVALUATION 5-10 MIN: CPT | Mod: 93 | Performed by: DERMATOLOGY

## 2022-08-08 RX ORDER — TRETINOIN 0.25 MG/G
CREAM TOPICAL AT BEDTIME
Qty: 45 G | Refills: 3 | Status: SHIPPED | OUTPATIENT
Start: 2022-08-08 | End: 2023-02-06 | Stop reason: ALTCHOICE

## 2022-08-08 RX ORDER — DOXYCYCLINE 100 MG/1
100 CAPSULE ORAL 2 TIMES DAILY
Qty: 60 CAPSULE | Refills: 4 | Status: SHIPPED | OUTPATIENT
Start: 2022-08-08 | End: 2023-02-06

## 2022-08-08 NOTE — PROGRESS NOTES
"[facility] Dermatology Note  Encounter Date: Aug 8, 2022  Telephone ([phone]). Location of teledermatologist: [facility] DERMATOLOGY [facility]IC [place]. Start time: 1:33. End time: 1:38.    Dermatology Problem List:  1. Severe nodulocystic acne  - doxycycline 100 mg BID, tretinoin 0.025% at bedtime, BPO 5% wash  - in reserve: isotretinoin  - on testosterone supplementation for FTM   ____________________________________________    Assessment & Plan:     1. Acne vulgaris: inflammatory, with scarring, in context of testosterone supplementation for FTM. Not interested in isotretinoin; we discussed risks/benefits of alternatives and will start doxycycline + topicals.  - doxycycline 100 mg BID  - tretinoin 0.025% cream at bedtime, BPO wash    Procedures Performed:    None    Follow-up: 3 months    Staff:     Mark Cleary MD, FAAD   of Dermatology  Department of Dermatology  [facility]    ____________________________________________    CC: Derm Problem (acne)    HPI:  Mr. Prakash Prasad is a(n) 32 year old adult who presents today as a return patient for acne vulgaris    Acne vulgaris - not contacted to start isotretinoin, but would prefer to avoid  - would like pill medication    Patient is otherwise feeling well, without additional skin concerns.    Labs Reviewed:  N/A    Physical Exam:  Vitals: There were no vitals taken for this visit.  SKIN: no photos    Medications:  Current Outpatient Medications   Medication     amphetamine-dextroamphetamine (ADDERALL XR) 5 MG 24 hr capsule     fluPHENAZine (PROLIXIN) 1 MG tablet     gabapentin (NEURONTIN) 300 MG capsule     lithium ER (LITHOBID) 300 MG CR tablet     Melatonin 10 MG TABS tablet     metaxalone (SKELAXIN) 800 MG tablet     needle, disp, 18G X 1\" MISC     Needle, Disp, 25G X 5/8\" MISC     nicotine (NICORETTE) 2 MG gum     OLANZapine (ZYPREXA) 20 MG tablet     syringe, disposable, 1 " ML MISC     testosterone cypionate (DEPOTESTOSTERONE) 200 MG/ML injection     No current facility-administered medications for this visit.      Past Medical/Surgical History:   Patient Active Problem List   Diagnosis     ADHD (attention deficit hyperactivity disorder)     Bipolar 1 disorder, manic, mild     Marijuana abuse     Polysubstance abuse (H)     GERD (gastroesophageal reflux disease)     Tobacco abuse     Intractable back pain     Optic neuritis     Cauda equina syndrome with neurogenic bladder (H)     Schizoaffective disorder, bipolar type (H)     PTSD (post-traumatic stress disorder)     Anxiety     Auditory hallucination     Class 2 obesity due to excess calories in adult     Nephrolithiasis     Cyst of left ovary     Borderline personality disorder (H)     Cannabis dependence (H)     Depression     Episodic mood disorder (H)     History of heroin abuse (H)     Moderate episode of recurrent major depressive disorder (H)     Opioid use disorder, severe, dependence (H)     Substance-induced psychotic disorder with hallucinations (H)     Nausea     Overdose     Bella (H)     Spell of altered consciousness     Urinary retention     Obesity (BMI 35.0-39.9) with comorbidity (H)     Chronic bilateral low back pain without sciatica     AVA (generalized anxiety disorder)     Aggressive behavior     Gender identity disorder     Lumbosacral radiculopathy at L5     DUB (dysfunctional uterine bleeding)     Seizure-like activity (H)     Acanthosis nigricans     Prediabetes     Schizoaffective disorder, chronic condition with acute exacerbation (H)     Bipolar affective disorder, mixed, severe, with psychotic behavior (H)     Schizophrenia, schizoaffective, chronic with acute exacerbation (H)     Akathisia     Hypertension, unspecified type     Female-to-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 person     Past Medical History:   Diagnosis Date     ADHD (attention deficit hyperactivity disorder)      Bipolar 1 disorder      Borderline  personality disorder      Cauda equina syndrome      Chronic low back pain      Depression      GERD (gastroesophageal reflux disease)      h/o TBI (traumatic brain injury)      Hypertension, unspecified type 12/16/2021     Marginal corneal ulcer, left 07/17/2015     Nephrolithiasis      obesity      Polysubstance abuse - methamphetamine, hallucinagen, heroin, marijuana     currently in remission     PONV (postoperative nausea and vomiting)      PTSD (post-traumatic stress disorder)        CC Referred Self, MD  No address on file on close of this encounter.

## 2022-08-08 NOTE — LETTER
8/8/2022       RE: Ayana Prasad  [address] [address]     Dear Colleague,    Thank you for referring your patient, Ayana Prasad, to the [facility] [place] at [facility]. Please see a copy of my visit note below.    [facility] Dermatology Note  Encounter Date: Aug 8, 2022  Telephone ([phone]). Location of teledermatologist: [facility] [place]. Start time: 1:33. End time: 1:38.    Dermatology Problem List:  1. Severe nodulocystic acne  - doxycycline 100 mg BID, tretinoin 0.025% at bedtime, BPO 5% wash  - in reserve: isotretinoin  - on testosterone supplementation for FTM   ____________________________________________    Assessment & Plan:     1. Acne vulgaris: inflammatory, with scarring, in context of testosterone supplementation for FTM. Not interested in isotretinoin; we discussed risks/benefits of alternatives and will start doxycycline + topicals.  - doxycycline 100 mg BID  - tretinoin 0.025% cream at bedtime, BPO wash    Procedures Performed:    None    Follow-up: 3 months    Staff:     Mark Cleary MD, FAAD   of Dermatology  Department of Dermatology  [facility]    ____________________________________________    CC: Derm Problem (acne)    HPI:  Mr. Prakash Prasad is a(n) 32 year old adult who presents today as a return patient for acne vulgaris    Acne vulgaris - not contacted to start isotretinoin, but would prefer to avoid  - would like pill medication    Patient is otherwise feeling well, without additional skin concerns.    Labs Reviewed:  N/A    Physical Exam:  Vitals: There were no vitals taken for this visit.  SKIN: no photos    Medications:  Current Outpatient Medications   Medication     amphetamine-dextroamphetamine (ADDERALL XR) 5 MG 24 hr capsule     fluPHENAZine (PROLIXIN) 1 MG tablet      "gabapentin (NEURONTIN) 300 MG capsule     lithium ER (LITHOBID) 300 MG CR tablet     Melatonin 10 MG TABS tablet     metaxalone (SKELAXIN) 800 MG tablet     needle, disp, 18G X 1\" MISC     Needle, Disp, 25G X 5/8\" MISC     nicotine (NICORETTE) 2 MG gum     OLANZapine (ZYPREXA) 20 MG tablet     syringe, disposable, 1 ML MISC     testosterone cypionate (DEPOTESTOSTERONE) 200 MG/ML injection     No current facility-administered medications for this visit.      Past Medical/Surgical History:   Patient Active Problem List   Diagnosis     ADHD (attention deficit hyperactivity disorder)     Bipolar 1 disorder, manic, mild     Marijuana abuse     Polysubstance abuse (H)     GERD (gastroesophageal reflux disease)     Tobacco abuse     Intractable back pain     Optic neuritis     Cauda equina syndrome with neurogenic bladder (H)     Schizoaffective disorder, bipolar type (H)     PTSD (post-traumatic stress disorder)     Anxiety     Auditory hallucination     Class 2 obesity due to excess calories in adult     Nephrolithiasis     Cyst of left ovary     Borderline personality disorder (H)     Cannabis dependence (H)     Depression     Episodic mood disorder (H)     History of heroin abuse (H)     Moderate episode of recurrent major depressive disorder (H)     Opioid use disorder, severe, dependence (H)     Substance-induced psychotic disorder with hallucinations (H)     Nausea     Overdose     Bella (H)     Spell of altered consciousness     Urinary retention     Obesity (BMI 35.0-39.9) with comorbidity (H)     Chronic bilateral low back pain without sciatica     AVA (generalized anxiety disorder)     Aggressive behavior     Gender identity disorder     Lumbosacral radiculopathy at L5     DUB (dysfunctional uterine bleeding)     Seizure-like activity (H)     Acanthosis nigricans     Prediabetes     Schizoaffective disorder, chronic condition with acute exacerbation (H)     Bipolar affective disorder, mixed, severe, with " psychotic behavior (H)     Schizophrenia, schizoaffective, chronic with acute exacerbation (H)     Akathisia     Hypertension, unspecified type     Female-to-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 person     Past Medical History:   Diagnosis Date     ADHD (attention deficit hyperactivity disorder)      Bipolar 1 disorder      Borderline personality disorder      Cauda equina syndrome      Chronic low back pain      Depression      GERD (gastroesophageal reflux disease)      h/o TBI (traumatic brain injury)      Hypertension, unspecified type 12/16/2021     Marginal corneal ulcer, left 07/17/2015     Nephrolithiasis      obesity      Polysubstance abuse - methamphetamine, hallucinagen, heroin, marijuana     currently in remission     PONV (postoperative nausea and vomiting)      PTSD (post-traumatic stress disorder)        CC Referred Self, MD  No address on file on close of this encounter.

## 2022-08-08 NOTE — NURSING NOTE
Chief Complaint   Patient presents with     Derm Problem     acne     Stated unable to send photos today    Teledermatology Nurse Call Patients:     Are you in the [facility] at the time of the encounter? yes    Today's visit will be billed to you and your insurance.    A teledermatology visit is not as thorough as an in-person visit and the quality of the photograph sent may not be of the same quality as that taken by the dermatology clinic.

## 2022-08-09 ENCOUNTER — TELEPHONE (OUTPATIENT)
Dept: DERMATOLOGY | Facility: CLINIC | Age: 32
End: 2022-08-09

## 2022-08-09 DIAGNOSIS — F25.0 SCHIZOAFFECTIVE DISORDER, BIPOLAR TYPE (H): ICD-10-CM

## 2022-08-09 DIAGNOSIS — F41.9 ANXIETY: ICD-10-CM

## 2022-08-09 NOTE — TELEPHONE ENCOUNTER
Prior Authorization Retail Medication Request    Medication/Dose: tretinoin (RETIN-A) 0.025 % external cream  ICD code (if different than what is on RX):  Acne vulgaris [L70.0]  Previously Tried and Failed:    Rationale:      Insurance Name:  Medicare  Insurance ID:  [id number]  [facility] [id number]

## 2022-08-10 ENCOUNTER — TRANSFERRED RECORDS (OUTPATIENT)
Dept: HEALTH INFORMATION MANAGEMENT | Facility: CLINIC | Age: 32
End: 2022-08-10

## 2022-08-10 RX ORDER — GABAPENTIN 300 MG/1
CAPSULE ORAL
Qty: 90 CAPSULE | Refills: 1 | OUTPATIENT
Start: 2022-08-10

## 2022-08-10 RX ORDER — FLUPHENAZINE HYDROCHLORIDE 1 MG/1
TABLET ORAL
Qty: 60 TABLET | Refills: 1 | OUTPATIENT
Start: 2022-08-10

## 2022-08-10 NOTE — TELEPHONE ENCOUNTER
Dup    fluPHENAZine HCl 1MG TABS*  Last rx: 7-7-22 for 60 t w/1 RF    Gabapentin 300MG CAPS  Last rx: 7-8-22 for 90 t w/1 RF

## 2022-08-10 NOTE — TELEPHONE ENCOUNTER
Prior Authorization Approval    Authorization Effective Date: 3/1/2022  Authorization Expiration Date: 8/10/2023  Medication: tretinoin (RETIN-A) 0.025 % external cream--APPROVED  Approved Dose/Quantity:   Reference #:     Insurance Company: [employer] - Phone [phone] Fax [phone]  Expected CoPay:       CoPay Card Available:      Foundation Assistance Needed:    Which Pharmacy is filling the prescription (Not needed for infusion/clinic administered): [id number] - [place], [place] - [address] [place] [address], [address]  Pharmacy Notified: Yes  Patient Notified: Yes **Instructed pharmacy to notify patient when script is ready to /ship.**

## 2022-08-10 NOTE — TELEPHONE ENCOUNTER
PA Initiation    Medication: tretinoin (RETIN-A) 0.025 % external cream  Insurance Company: [employer] - Phone [phone] Fax [phone]  Pharmacy Filling the Rx: [id number] - [place], [place] - [address] [place] [address], [address]  Filling Pharmacy Phone: [phone]  Filling Pharmacy Fax: [phone]  Start Date: 8/10/2022

## 2022-08-11 ENCOUNTER — VIRTUAL VISIT (OUTPATIENT)
Dept: PSYCHIATRY | Facility: CLINIC | Age: 32
End: 2022-08-11
Attending: NURSE PRACTITIONER
Payer: COMMERCIAL

## 2022-08-11 ENCOUNTER — TELEPHONE (OUTPATIENT)
Dept: DERMATOLOGY | Facility: CLINIC | Age: 32
End: 2022-08-11

## 2022-08-11 ENCOUNTER — MYC MEDICAL ADVICE (OUTPATIENT)
Dept: PALLIATIVE MEDICINE | Facility: CLINIC | Age: 32
End: 2022-08-11

## 2022-08-11 DIAGNOSIS — F90.2 ATTENTION DEFICIT HYPERACTIVITY DISORDER (ADHD), COMBINED TYPE: ICD-10-CM

## 2022-08-11 DIAGNOSIS — F41.9 ANXIETY: ICD-10-CM

## 2022-08-11 DIAGNOSIS — M51.369 DDD (DEGENERATIVE DISC DISEASE), LUMBAR: Primary | ICD-10-CM

## 2022-08-11 DIAGNOSIS — F25.0 SCHIZOAFFECTIVE DISORDER, BIPOLAR TYPE (H): Primary | ICD-10-CM

## 2022-08-11 PROCEDURE — 99214 OFFICE O/P EST MOD 30 MIN: CPT | Mod: 95 | Performed by: NURSE PRACTITIONER

## 2022-08-11 RX ORDER — GABAPENTIN 300 MG/1
300 CAPSULE ORAL 3 TIMES DAILY
Qty: 90 CAPSULE | Refills: 1 | Status: SHIPPED | OUTPATIENT
Start: 2022-08-11 | End: 2022-08-29

## 2022-08-11 RX ORDER — DEXTROAMPHETAMINE SACCHARATE, AMPHETAMINE ASPARTATE MONOHYDRATE, DEXTROAMPHETAMINE SULFATE AND AMPHETAMINE SULFATE 1.25; 1.25; 1.25; 1.25 MG/1; MG/1; MG/1; MG/1
5 CAPSULE, EXTENDED RELEASE ORAL DAILY
Qty: 30 CAPSULE | Refills: 0 | Status: SHIPPED | OUTPATIENT
Start: 2022-08-18 | End: 2022-08-24 | Stop reason: DRUGHIGH

## 2022-08-11 NOTE — TELEPHONE ENCOUNTER
RN called patient to inquire about the questions patient had regarding medication BPO 5% wash and tretinoin cream application. Patient was curious as how to use the BPO and when to apply the tretinoin. RN relayed message to patient that they should wash the face with the BPO and after the face is cleansed with the BPO and washed off, to then apply the tretinoin cream at bedtime. Patient understood.    Nadine ODONNELL RN

## 2022-08-11 NOTE — TELEPHONE ENCOUNTER
[facility] Call Center    Phone Message    May a detailed message be left on voicemail: yes     Reason for Call: Other: Pt has questions about the medications that were prescribed. Please call [phone]. Thanks!      Action Taken: Message routed to:  [facility] DERM    Travel Screening: Not Applicable

## 2022-08-11 NOTE — PATIENT INSTRUCTIONS
-Continue on current medication regimen.  Mattie will reach out to you after speaking to pharmacist.  -Monitor blood pressure 2 times a week and report back to mattie.    Your next appointment is scheduled on 8/24/2022 (Wed) at noon.      **For crisis resources, please see the information at the end of this document**   Patient Education    Thank you for coming to the [facility] MENTAL HEALTH & [facility]DI[facility] [place] CLIN[facility].     Lab Testing:  If you had lab testing today and your results are reassuring or normal they will be mailed to you or sent through [employer] within 7 days. If the lab tests need quick action we will call you with the results. The phone number we will call with results is # [phone]. If this is not the best number please call our clinic and change the number.     Medication Refills:  If you need any refills please call your pharmacy and they will contact us. Our fax number for refills is [phone].   Three business days of notice are needed for general medication refill requests.   Five business days of notice are needed for controlled substance refill requests.   If you need to change to a different pharmacy, please contact the new pharmacy directly. The new pharmacy will help you get your medications transferred.     Contact Us:  Please call [phone] during business hours (8-5:00 M-F).   If you have medication related questions after clinic hours, or on the weekend, please call [phone].     Financial Assistance [phone]   Medical Records [phone]       MENTAL HEALTH CRISIS RESOURCES:  For a emergency help, please call 911 or go to the nearest Emergency Department.     Emergency Walk-In Options:   EmPATH Unit @ [place] ([place]): [phone] - Specialized mental health emergency area designed to be calming  [facility] ([place]): [phone]  [facility] ([place]): [phone]  [facility]  [place]): [phone]    [facility] Crisis Information:   [place]: [phone]  [place]: [phone]  [place] ([place]) - Adult: [phone]     Child: [phone]  [place] - Adult: [phone]     Child: [phone]  [place]: [phone]  List of all [facility] resources:   https://[place].[facility]/dhs/people-we-serve/adults/health-care/mental-health/resources/crisis-contacts.jsp    National Crisis Information:   Crisis Text Line: Text  [place]  to 023053  Suicide & Crisis Lifeline: 988  National Suicide Prevention Lifeline: 9-416-755-TALK (1-218.776.4343)       For online chat options, visit https://suicidepreventionlifeline.org/chat/  Poison Control Center: 1-154.363.5391  Trans Lifeline: 1-419.146.2430 - Hotline for transgender people of all ages  The [employer]: 3-795-595-3069 - Hotline for LGBT youth     For Non-Emergency Support:   Fast Tracker: Mental Health & Substance Use Disorder Resources -   https://www.[employer]ict[employer]ulmonxn.org/

## 2022-08-11 NOTE — PROGRESS NOTES
Ayana Prasad is a 32 year old who has consented to receive services via billable video visit.      Pt will join video visit via: [employer]  If there are problems joining the visit, send backup video invite via: Text to preferred phone: [phone]      Originating Location (patient location): Patient's home  Distant Location (provider location): [facility] MENTAL HEALTH & [facility]DI[facility] [place] [facility]    Will anyone else be joining the video visit? No
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
## 2022-08-12 DIAGNOSIS — F25.0 SCHIZOAFFECTIVE DISORDER, BIPOLAR TYPE (H): ICD-10-CM

## 2022-08-12 RX ORDER — FLUPHENAZINE HYDROCHLORIDE 1 MG/1
1 TABLET ORAL DAILY
Qty: 14 TABLET | Refills: 0 | Status: SHIPPED | DISCHARGE
Start: 2022-08-12 | End: 2022-08-24

## 2022-08-12 NOTE — TELEPHONE ENCOUNTER
Call to pt to get more information.      To: PAIN NURSE      From: Prakash VINCENT Shanice      Created: 8/11/2022 3:08 PM        *-*-*This message has not been handled.*-*-*    I was wondering if you could prescribe me more gabapentin? Higher than the 1200mg dose I was on, if possible.      Thanks,   Prakash        Pt stated that she is on Gabapentin for anxiety from her therapist.  Her therapist said she needed to check with Pain about increasing her dose for pain.      Previous dose Per Pt was 900mg q am, 1200mg q noon, 900mg q pm.  Pt reports that this dose was helpful.     Pt reports her pain is in her left lower back where her scar is and down left leg.     Pt has Numbness and tingling in left leg, this is not new, rather related to her surgery and never recovered.  Pain is shooting and sharp.    Pt reports also she has Left arm pain that is dull a pain going down arm, Pt reports this is coming from her shoulder blade area.      Pt uses a lot of heat to help both area.    Pharmacy-[facility].      Ata Ambrose RN  Patient Care Supervisor   [facility]

## 2022-08-14 RX ORDER — GABAPENTIN 300 MG/1
CAPSULE ORAL
Qty: 90 CAPSULE | Refills: 1 | OUTPATIENT
Start: 2022-08-14

## 2022-08-16 ENCOUNTER — LAB (OUTPATIENT)
Dept: LAB | Facility: CLINIC | Age: 32
End: 2022-08-16
Payer: COMMERCIAL

## 2022-08-16 DIAGNOSIS — F64.9 GENDER DYSPHORIA: ICD-10-CM

## 2022-08-16 LAB
ALBUMIN SERPL-MCNC: 3.7 G/DL (ref 3.4–5)
ALP SERPL-CCNC: 83 U/L (ref 40–150)
ALT SERPL W P-5'-P-CCNC: 57 U/L (ref 0–70)
ANION GAP SERPL CALCULATED.3IONS-SCNC: 8 MMOL/L (ref 3–14)
AST SERPL W P-5'-P-CCNC: 38 U/L (ref 0–45)
BILIRUB SERPL-MCNC: 0.2 MG/DL (ref 0.2–1.3)
BUN SERPL-MCNC: 13 MG/DL (ref 7–30)
CALCIUM SERPL-MCNC: 9.2 MG/DL (ref 8.5–10.1)
CHLORIDE BLD-SCNC: 111 MMOL/L (ref 94–109)
CHOLEST SERPL-MCNC: 191 MG/DL
CO2 SERPL-SCNC: 21 MMOL/L (ref 20–32)
CREAT SERPL-MCNC: 0.67 MG/DL (ref 0.52–1.25)
ERYTHROCYTE [DISTWIDTH] IN BLOOD BY AUTOMATED COUNT: 13.3 % (ref 10–15)
FASTING STATUS PATIENT QL REPORTED: NO
GFR SERPL CREATININE-BSD FRML MDRD: >90 ML/MIN/1.73M2
GLUCOSE BLD-MCNC: 182 MG/DL (ref 70–99)
HCT VFR BLD AUTO: 41.6 % (ref 35–53)
HDLC SERPL-MCNC: 41 MG/DL
HGB BLD-MCNC: 13.8 G/DL (ref 11.7–17.7)
LDLC SERPL CALC-MCNC: ABNORMAL MG/DL
MCH RBC QN AUTO: 28.9 PG (ref 26.5–33)
MCHC RBC AUTO-ENTMCNC: 33.2 G/DL (ref 31.5–36.5)
MCV RBC AUTO: 87 FL (ref 78–100)
NONHDLC SERPL-MCNC: 150 MG/DL
PLATELET # BLD AUTO: 322 10E3/UL (ref 150–450)
POTASSIUM BLD-SCNC: 3.9 MMOL/L (ref 3.4–5.3)
PROT SERPL-MCNC: 7.7 G/DL (ref 6.8–8.8)
RBC # BLD AUTO: 4.78 10E6/UL (ref 3.8–5.9)
SODIUM SERPL-SCNC: 140 MMOL/L (ref 133–144)
TRIGL SERPL-MCNC: 545 MG/DL
WBC # BLD AUTO: 9.2 10E3/UL (ref 4–11)

## 2022-08-16 PROCEDURE — 80061 LIPID PANEL: CPT | Performed by: PATHOLOGY

## 2022-08-16 PROCEDURE — 36415 COLL VENOUS BLD VENIPUNCTURE: CPT | Performed by: PATHOLOGY

## 2022-08-16 PROCEDURE — 80053 COMPREHEN METABOLIC PANEL: CPT | Performed by: PATHOLOGY

## 2022-08-16 PROCEDURE — 85027 COMPLETE CBC AUTOMATED: CPT | Performed by: PATHOLOGY

## 2022-08-24 ENCOUNTER — VIRTUAL VISIT (OUTPATIENT)
Dept: PSYCHIATRY | Facility: CLINIC | Age: 32
End: 2022-08-24
Attending: NURSE PRACTITIONER
Payer: COMMERCIAL

## 2022-08-24 DIAGNOSIS — F99 INSOMNIA DUE TO OTHER MENTAL DISORDER: ICD-10-CM

## 2022-08-24 DIAGNOSIS — F90.2 ATTENTION DEFICIT HYPERACTIVITY DISORDER (ADHD), COMBINED TYPE: Primary | ICD-10-CM

## 2022-08-24 DIAGNOSIS — F51.05 INSOMNIA DUE TO OTHER MENTAL DISORDER: ICD-10-CM

## 2022-08-24 DIAGNOSIS — F25.0 SCHIZOAFFECTIVE DISORDER, BIPOLAR TYPE (H): ICD-10-CM

## 2022-08-24 PROCEDURE — 99214 OFFICE O/P EST MOD 30 MIN: CPT | Mod: 95 | Performed by: NURSE PRACTITIONER

## 2022-08-24 RX ORDER — LITHIUM CARBONATE 300 MG/1
900 TABLET, FILM COATED, EXTENDED RELEASE ORAL AT BEDTIME
Qty: 90 TABLET | Refills: 1 | Status: SHIPPED | OUTPATIENT
Start: 2022-08-24 | End: 2022-10-11

## 2022-08-24 RX ORDER — OLANZAPINE 20 MG/1
20 TABLET ORAL AT BEDTIME
Qty: 30 TABLET | Refills: 1 | Status: SHIPPED | OUTPATIENT
Start: 2022-08-24 | End: 2022-10-11

## 2022-08-24 RX ORDER — DEXTROAMPHETAMINE SACCHARATE, AMPHETAMINE ASPARTATE MONOHYDRATE, DEXTROAMPHETAMINE SULFATE AND AMPHETAMINE SULFATE 2.5; 2.5; 2.5; 2.5 MG/1; MG/1; MG/1; MG/1
10 CAPSULE, EXTENDED RELEASE ORAL DAILY
Qty: 30 CAPSULE | Refills: 0 | Status: SHIPPED | OUTPATIENT
Start: 2022-08-24 | End: 2022-09-19 | Stop reason: DRUGHIGH

## 2022-08-24 ASSESSMENT — PATIENT HEALTH QUESTIONNAIRE - PHQ9
10. IF YOU CHECKED OFF ANY PROBLEMS, HOW DIFFICULT HAVE THESE PROBLEMS MADE IT FOR YOU TO DO YOUR WORK, TAKE CARE OF THINGS AT HOME, OR GET ALONG WITH OTHER PEOPLE: SOMEWHAT DIFFICULT
SUM OF ALL RESPONSES TO PHQ QUESTIONS 1-9: 8
SUM OF ALL RESPONSES TO PHQ QUESTIONS 1-9: 8

## 2022-08-24 NOTE — PATIENT INSTRUCTIONS
-Increase Adderall XR to 10 mg daily for ADHD.  Monitor changes in anxiety or sleep.  Please continue to monitor blood pressure at least 2 times a week and report it back to mattie.  -Prolixin is discontinued.  -Continue all other medication regimen.    Your next appointment is scheduled on 9/22/2022 (Thu) at 11am.      **For crisis resources, please see the information at the end of this document**   Patient Education    Thank you for coming to the [facility].     Lab Testing:  If you had lab testing today and your results are reassuring or normal they will be mailed to you or sent through [employer] within 7 days. If the lab tests need quick action we will call you with the results. The phone number we will call with results is # [phone]. If this is not the best number please call our clinic and change the number.     Medication Refills:  If you need any refills please call your pharmacy and they will contact us. Our fax number for refills is [phone].   Three business days of notice are needed for general medication refill requests.   Five business days of notice are needed for controlled substance refill requests.   If you need to change to a different pharmacy, please contact the new pharmacy directly. The new pharmacy will help you get your medications transferred.     Contact Us:  Please call [phone] during business hours (8-5:00 M-F).   If you have medication related questions after clinic hours, or on the weekend, please call [phone].     Financial Assistance [phone]   Medical Records [phone]       MENTAL HEALTH CRISIS RESOURCES:  For a emergency help, please call 911 or go to the nearest Emergency Department.     Emergency Walk-In Options:   EmPATH Unit @ [facility] ([place]): [phone] - Specialized mental health emergency area designed to be calming  [facility] ([place]): [phone]  [facility] [place]  [facility] ([place]): [phone]  [facility] ([place]): [phone]    [facility] Crisis Information:   [place]: [phone]  [place]: [phone]  [place] ([place]) - Adult: [phone]     Child: [phone]  [place] - Adult: [phone]     Child: [phone]  [place]: [phone]  List of all [facility] resources:   https://[email or web address]/people-[email or web address]-serve/[email or web address]-care/mental-health/resources/crisis-contacts.jsp    National Crisis Information:   Crisis Text Line: Text  [place]  to 307427  Suicide & Crisis Lifeline: 988  National Suicide Prevention Lifeline: 4-442-068-TALK (1-361.695.1752)       For online chat options, visit https://suicidepreventionlifeline.org/chat/  Poison Control Center: 1-164.226.3517  Trans Lifeline: 8-584-569-7171 - Hotline for transgender people of all ages  [employer]: 5-057-572-2138 - Hotline for LGBT youth     For Non-Emergency Support:   Fast Tracker: Mental Health & Substance Use Disorder Resources -   https://www.[employer].org/

## 2022-08-24 NOTE — TELEPHONE ENCOUNTER
Sorry for the delay in responding.     So, Prakash is currently using gabapentin 300mg TID and psychiatrist is Okay with my increasing the dosage.     Let's go to 600mg TID and increase by one cap per day every 3 days until this is accomplished (ie 300/300/600 x 3, 600/300/600 x 3, 600 TID, then patient should check in and we can continue to slowly increase but I want to know how he is doing once on 600mg TID.    Thanks.   Luz BARAHONA, RN CNP, FNP  [facility]  [facility]

## 2022-08-24 NOTE — TELEPHONE ENCOUNTER
Outreach X1. Left a  requesting call back. Provided call back number for questions.    Madelinehart from patient:    Reggie Escobar is fine with increasing the dose of Gabapentin. We will titrate slowly until you are on 600 MG three times daily and then please call us at that dose for a check in. We can go up from there after we check in. If you are currently taking Gabapentin 300 MG three times daily the schedule to increase is below. Let me know if you have any questions. And if there are any side effects please return to the last tolerated dose.      Gabapentin 300 MG increasing by 1 capsule every 3 days     300/300/600 for 3 days  600/300/600 for 3 days  600/600/600 Contact us after 1 week at this dose with an update. Please let me know if you have any questions and also let me know if you need this refilled. Thank you.     Nancy Sands, ELIN, RN  Care Coordinator  [facility]

## 2022-08-24 NOTE — NURSING NOTE
Patient declined individual allergy and medication review by support staff because he said nothing has changed and didn't want to go over them.  Will do QNRS on his own.  Violetta Driscoll VF

## 2022-08-29 ENCOUNTER — TELEPHONE (OUTPATIENT)
Dept: FAMILY MEDICINE | Facility: CLINIC | Age: 32
End: 2022-08-29

## 2022-08-29 ENCOUNTER — OFFICE VISIT (OUTPATIENT)
Dept: FAMILY MEDICINE | Facility: CLINIC | Age: 32
End: 2022-08-29
Payer: COMMERCIAL

## 2022-08-29 VITALS
BODY MASS INDEX: 43.99 KG/M2 | OXYGEN SATURATION: 98 % | DIASTOLIC BLOOD PRESSURE: 88 MMHG | WEIGHT: 233 LBS | TEMPERATURE: 99 F | SYSTOLIC BLOOD PRESSURE: 132 MMHG | HEIGHT: 61 IN | RESPIRATION RATE: 18 BRPM | HEART RATE: 92 BPM

## 2022-08-29 DIAGNOSIS — Z12.4 CERVICAL CANCER SCREENING: ICD-10-CM

## 2022-08-29 DIAGNOSIS — E78.1 HYPERTRIGLYCERIDEMIA: ICD-10-CM

## 2022-08-29 DIAGNOSIS — Z00.00 ROUTINE GENERAL MEDICAL EXAMINATION AT A HEALTH CARE FACILITY: ICD-10-CM

## 2022-08-29 DIAGNOSIS — R73.03 PREDIABETES: ICD-10-CM

## 2022-08-29 DIAGNOSIS — G89.29 CHRONIC BILATERAL LOW BACK PAIN WITHOUT SCIATICA: ICD-10-CM

## 2022-08-29 DIAGNOSIS — F25.0 SCHIZOAFFECTIVE DISORDER, BIPOLAR TYPE (H): ICD-10-CM

## 2022-08-29 DIAGNOSIS — F12.20 CANNABIS DEPENDENCE (H): ICD-10-CM

## 2022-08-29 DIAGNOSIS — M54.50 CHRONIC BILATERAL LOW BACK PAIN WITHOUT SCIATICA: ICD-10-CM

## 2022-08-29 DIAGNOSIS — K21.9 GASTROESOPHAGEAL REFLUX DISEASE WITHOUT ESOPHAGITIS: Primary | ICD-10-CM

## 2022-08-29 DIAGNOSIS — I10 HYPERTENSION, UNSPECIFIED TYPE: ICD-10-CM

## 2022-08-29 DIAGNOSIS — Z72.0 TOBACCO ABUSE: ICD-10-CM

## 2022-08-29 DIAGNOSIS — E66.01 MORBID OBESITY (H): ICD-10-CM

## 2022-08-29 DIAGNOSIS — R73.03 PREDIABETES: Primary | ICD-10-CM

## 2022-08-29 PROBLEM — E66.812 CLASS 2 OBESITY DUE TO EXCESS CALORIES IN ADULT: Status: RESOLVED | Noted: 2018-03-07 | Resolved: 2022-08-29

## 2022-08-29 PROBLEM — R40.4 SPELL OF ALTERED CONSCIOUSNESS: Status: RESOLVED | Noted: 2019-06-27 | Resolved: 2022-08-29

## 2022-08-29 PROBLEM — E66.09 CLASS 2 OBESITY DUE TO EXCESS CALORIES IN ADULT: Status: RESOLVED | Noted: 2018-03-07 | Resolved: 2022-08-29

## 2022-08-29 LAB
ALBUMIN SERPL-MCNC: 3.9 G/DL (ref 3.4–5)
ALP SERPL-CCNC: 81 U/L (ref 40–150)
ALT SERPL W P-5'-P-CCNC: 58 U/L (ref 0–70)
ANION GAP SERPL CALCULATED.3IONS-SCNC: 6 MMOL/L (ref 3–14)
AST SERPL W P-5'-P-CCNC: 34 U/L (ref 0–45)
BILIRUB SERPL-MCNC: 0.4 MG/DL (ref 0.2–1.3)
BUN SERPL-MCNC: 8 MG/DL (ref 7–30)
CALCIUM SERPL-MCNC: 9.3 MG/DL (ref 8.5–10.1)
CHLORIDE BLD-SCNC: 111 MMOL/L (ref 94–109)
CHOLEST SERPL-MCNC: 162 MG/DL
CO2 SERPL-SCNC: 23 MMOL/L (ref 20–32)
CREAT SERPL-MCNC: 0.62 MG/DL (ref 0.52–1.25)
FASTING STATUS PATIENT QL REPORTED: YES
GFR SERPL CREATININE-BSD FRML MDRD: >90 ML/MIN/1.73M2
GLUCOSE BLD-MCNC: 89 MG/DL (ref 70–99)
HBA1C MFR BLD: 6.4 % (ref 0–5.6)
HDLC SERPL-MCNC: 50 MG/DL
LDLC SERPL CALC-MCNC: 78 MG/DL
NONHDLC SERPL-MCNC: 112 MG/DL
POTASSIUM BLD-SCNC: 4.1 MMOL/L (ref 3.4–5.3)
PROT SERPL-MCNC: 7.7 G/DL (ref 6.8–8.8)
SODIUM SERPL-SCNC: 140 MMOL/L (ref 133–144)
TRIGL SERPL-MCNC: 170 MG/DL
TSH SERPL DL<=0.005 MIU/L-ACNC: 0.75 MU/L (ref 0.4–4)

## 2022-08-29 PROCEDURE — 0064A COVID-19,PF,MODERNA (18+ YRS BOOSTER .25ML): CPT | Performed by: NURSE PRACTITIONER

## 2022-08-29 PROCEDURE — 36415 COLL VENOUS BLD VENIPUNCTURE: CPT | Performed by: NURSE PRACTITIONER

## 2022-08-29 PROCEDURE — 91306 COVID-19,PF,MODERNA (18+ YRS BOOSTER .25ML): CPT | Performed by: NURSE PRACTITIONER

## 2022-08-29 PROCEDURE — 99214 OFFICE O/P EST MOD 30 MIN: CPT | Mod: 25 | Performed by: NURSE PRACTITIONER

## 2022-08-29 PROCEDURE — 84443 ASSAY THYROID STIM HORMONE: CPT | Performed by: NURSE PRACTITIONER

## 2022-08-29 PROCEDURE — 80061 LIPID PANEL: CPT | Performed by: NURSE PRACTITIONER

## 2022-08-29 PROCEDURE — 83036 HEMOGLOBIN GLYCOSYLATED A1C: CPT | Performed by: NURSE PRACTITIONER

## 2022-08-29 PROCEDURE — 99395 PREV VISIT EST AGE 18-39: CPT | Mod: 25 | Performed by: NURSE PRACTITIONER

## 2022-08-29 PROCEDURE — 80053 COMPREHEN METABOLIC PANEL: CPT | Performed by: NURSE PRACTITIONER

## 2022-08-29 RX ORDER — METFORMIN HCL 500 MG
TABLET, EXTENDED RELEASE 24 HR ORAL
Qty: 194 TABLET | Refills: 0 | Status: SHIPPED | OUTPATIENT
Start: 2022-08-29 | End: 2022-12-07

## 2022-08-29 RX ORDER — METFORMIN HCL 500 MG
TABLET, EXTENDED RELEASE 24 HR ORAL
Qty: 104 TABLET | Refills: 0 | Status: SHIPPED | OUTPATIENT
Start: 2022-08-29 | End: 2022-10-14

## 2022-08-29 RX ORDER — ROSUVASTATIN CALCIUM 20 MG/1
20 TABLET, COATED ORAL DAILY
Qty: 90 TABLET | Refills: 1 | Status: SHIPPED | OUTPATIENT
Start: 2022-08-29 | End: 2022-08-30

## 2022-08-29 RX ORDER — FAMOTIDINE 20 MG/1
20 TABLET, FILM COATED ORAL AT BEDTIME
Qty: 90 TABLET | Refills: 3 | Status: SHIPPED | OUTPATIENT
Start: 2022-08-29 | End: 2023-05-03

## 2022-08-29 RX ORDER — GABAPENTIN 300 MG/1
600 CAPSULE ORAL 3 TIMES DAILY
Qty: 180 CAPSULE | Refills: 1 | Status: SHIPPED | OUTPATIENT
Start: 2022-08-29 | End: 2022-09-14

## 2022-08-29 ASSESSMENT — ENCOUNTER SYMPTOMS
CHEST TIGHTNESS: 0
SORE THROAT: 0
HEMATOCHEZIA: 0
POLYPHAGIA: 0
ACTIVITY CHANGE: 0
WEAKNESS: 0
DYSURIA: 0
SHORTNESS OF BREATH: 0
ARTHRALGIAS: 0
NAUSEA: 0
FATIGUE: 0
DYSPHORIC MOOD: 0
FREQUENCY: 0
MYALGIAS: 0
NUMBNESS: 0
EYE PAIN: 1
POLYDIPSIA: 0
BACK PAIN: 1
LIGHT-HEADEDNESS: 0
PALPITATIONS: 0
COUGH: 0
VOMITING: 0
HEADACHES: 1
CONSTIPATION: 0
HEARTBURN: 0
RHINORRHEA: 0
ABDOMINAL PAIN: 0
SLEEP DISTURBANCE: 0
DIFFICULTY URINATING: 0
JOINT SWELLING: 0
ABDOMINAL DISTENTION: 0
NERVOUS/ANXIOUS: 0
FEVER: 0
WHEEZING: 0
PARESTHESIAS: 0
CHILLS: 0
UNEXPECTED WEIGHT CHANGE: 1
DIZZINESS: 0
HEMATURIA: 0
DIARRHEA: 0

## 2022-08-29 ASSESSMENT — PATIENT HEALTH QUESTIONNAIRE - PHQ9
SUM OF ALL RESPONSES TO PHQ QUESTIONS 1-9: 3
SUM OF ALL RESPONSES TO PHQ QUESTIONS 1-9: 3
10. IF YOU CHECKED OFF ANY PROBLEMS, HOW DIFFICULT HAVE THESE PROBLEMS MADE IT FOR YOU TO DO YOUR WORK, TAKE CARE OF THINGS AT HOME, OR GET ALONG WITH OTHER PEOPLE: NOT DIFFICULT AT ALL

## 2022-08-29 ASSESSMENT — PAIN SCALES - GENERAL: PAINLEVEL: NO PAIN (0)

## 2022-08-29 ASSESSMENT — ACTIVITIES OF DAILY LIVING (ADL): CURRENT_FUNCTION: NO ASSISTANCE NEEDED

## 2022-08-29 NOTE — PROGRESS NOTES
"   SUBJECTIVE:   CC: Ayana Prasad is an 32 year old  who presents for preventive health visit.       Patient has been advised of split billing requirements and indicates understanding: Yes  Healthy Habits:     In general, how would you rate your overall health?  Fair    Frequency of exercise:  None    Do you usually eat at least 4 servings of fruit and vegetables a day, include whole grains    & fiber and avoid regularly eating high fat or \"junk\" foods?  No    Taking medications regularly:  Yes    Medication side effects:  Other    Ability to successfully perform activities of daily living:  No assistance needed    Home Safety:  No safety concerns identified    Hearing Impairment:  No hearing concerns    In the past 6 months, have you been bothered by leaking of urine?  No    In general, how would you rate your overall mental or emotional health?  Good      PHQ-2 Total Score: 3    Additional concerns today:  No      Today's PHQ-2 Score:   PHQ-2 ( 1999 Pf[employer]) 8/29/2022   Q1: Little interest or pleasure in doing things 3   Q2: Feeling down, depressed or hopeless 0   PHQ-2 Score 3   PHQ-2 Total Score (12-17 Years)- Positive if 3 or more points; Administer PHQ-A if positive -   Q1: Little interest or pleasure in doing things Nearly every day   Q2: Feeling down, depressed or hopeless Not at all   PHQ-2 Score 3     Abuse: Current or Past (Physical, Sexual or Emotional) - No  Do you feel safe in your environment? Yes      Social History     Tobacco Use     Smoking status: Current Every Day Smoker     Packs/day: 0.00     Years: 5.00     Pack years: 0.00     Types: Dip, chew, snus or snuff, Other, Cigars, Cigarettes     Start date: 8/1/2011     Smokeless tobacco: Former User     Types: Chew     Quit date: 12/17/2019   Substance Use Topics     Alcohol use: No     If you drink alcohol do you typically have >3 drinks per day or >7 drinks per week? No    Alcohol Use 8/29/2022   Prescreen: >3 drinks/day or >7 drinks/week? No "   Prescreen: >3 drinks/day or >7 drinks/week? -     Reviewed orders with patient.  Reviewed health maintenance and updated orders accordingly - Yes  BP Readings from Last 3 Encounters:   22 132/88   22 (!) 139/96   22 (!) 152/105    Wt Readings from Last 3 Encounters:   22 105.7 kg (233 lb)   22 104.3 kg (230 lb)   06/10/22 104.3 kg (230 lb)          Breast Cancer Screening:  Any new diagnosis of family breast, ovarian, or bowel cancer? No    FHS-7: No flowsheet data found.      Pertinent mammograms are reviewed under the imaging tab.    History of abnormal Pap smear: NO - age 30-65 PAP every 5 years with negative HPV co-testing recommended  PAP / HPV Latest Ref Rng & Units 2018   PAP (Historical) - NIL   HPV16 NEG:Negative Negative   HPV18 NEG:Negative Negative   HRHPV NEG:Negative Negative     Reviewed and updated as needed this visit by clinical staff   Tobacco  Allergies  Meds             [facility]    Reviewed and updated as needed this visit by Provider                       Here today for physical.  Is not fasting for labs.  Had recent triglycerides and A1c checked that returned and were elevated.  Here today for treatment.  Brother home was 52 years old  of a MI in January.  No other family history of high blood pressure or heart disease.    Has been going to pain management.  Was approved for medical [facility]-is going to the dispensary, is not using on supply. Has noticed improvement in pain and also in mental health.  Recently had gabapentin increased to 600 mg 3 times per day.  Has noticed improvement in pain as well.    Continue to follow closely with mental health.  Prolixin was recently discontinued.  Prolixin made feel emotionless. Still has not worn off. Increased dose of adderall.  Continues to hear voices but they are manageable.  Denies any thoughts of harming self or others.    Has been trying to lose weight.  Will walk multiple miles per day.   Walking helps with mental health.  Has not been able to lose any weight in fact, continues to gain weight.  Recently had eye exam.  Was told that had swelling to the back of optic nerves and poor peripheral vision.  Is going to be seeing a retina specialist.    Has previously been referred to neurology.  Has upcoming appointment in September.    Never an IV drug user.     The last couple of weeks has woke up nightly with burning in throat.  Will have fluid in throat.  Will have to vomit or spit fluid out.    Last Pap: 2018 normal, declines repeat  Last mammogram: Never, no family history   Last BMD: Not applicable  Last Colonoscopy: 2011-no family history   Last eye exam: 8/10/2022  Last dental exam: 3 years ago  Last tetanus vaccine: 2014  Last influenza vaccine: Gets in the fall  Last shingles vaccine: N/A  Last pneumonia vaccine: Defer  Last COVID vaccine: has had both doses  Last COVID booster: Plans to get here today  Hep C screen: May 2022  HIV screen: 2019  AAA screen (age 65-78 with smoking hx): Not applicable  IVD (HTN, Hyperlipid, Smoking): Not applicable  Lung CA screening (50-77, 20 pk smoking hx) Quit within 15 years: Not applicable      Review of Systems   Constitutional: Positive for unexpected weight change (Gain). Negative for activity change, chills, fatigue and fever.   HENT: Negative for congestion, ear pain, hearing loss, rhinorrhea and sore throat.    Eyes: Positive for pain. Negative for visual disturbance.   Respiratory: Negative for cough, chest tightness, shortness of breath and wheezing.    Cardiovascular: Negative for chest pain and palpitations.   Gastrointestinal: Negative for abdominal distention, abdominal pain, constipation, diarrhea, nausea and vomiting.        Burning in throat, fluid in throat.   Endocrine: Negative for cold intolerance, heat intolerance, polydipsia, polyphagia and polyuria.   Genitourinary: Negative for difficulty urinating, dysuria, enuresis, frequency, genital  "sores, hematuria and urgency.   Musculoskeletal: Positive for back pain. Negative for arthralgias, joint swelling and myalgias.   Skin: Negative for rash.   Neurological: Positive for headaches. Negative for dizziness, weakness, light-headedness and numbness.   Psychiatric/Behavioral: Negative for dysphoric mood and sleep disturbance. The patient is not nervous/anxious.           OBJECTIVE:   /88   Pulse 92   Temp 99  F (37.2  C) (Tympanic)   Resp 18   Ht 1.549 m (5' 1\")   Wt 105.7 kg (233 lb)   SpO2 98%   BMI 44.02 kg/m    Physical Exam  Constitutional:       Appearance: Normal appearance. He is well-developed.   HENT:      Head: Normocephalic and atraumatic.      Right Ear: Tympanic membrane and external ear normal. No middle ear effusion.      Left Ear: Tympanic membrane and external ear normal.  No middle ear effusion.      Nose: No mucosal edema.      Mouth/Throat:      Pharynx: Uvula midline.   Eyes:      Pupils: Pupils are equal, round, and reactive to light.   Neck:      Thyroid: No thyromegaly.      Vascular: No carotid bruit.   Cardiovascular:      Rate and Rhythm: Normal rate and regular rhythm.      Pulses:           Femoral pulses are 2+ on the right side and 2+ on the left side.     Heart sounds: Normal heart sounds.   Pulmonary:      Effort: Pulmonary effort is normal.      Breath sounds: Normal breath sounds.   Abdominal:      General: Bowel sounds are normal.      Palpations: Abdomen is soft.      Tenderness: There is no abdominal tenderness.   Musculoskeletal:         General: Normal range of motion.      Cervical back: Normal range of motion.   Skin:     General: Skin is warm and dry.      Findings: No rash.   Neurological:      Mental Status: He is alert.   Psychiatric:         Behavior: Behavior normal.         Diagnostic Test Results:  Labs reviewed in Epic    ASSESSMENT/PLAN:   Routine general medical examination at a health care facility  Screening guidelines reviewed. "     Cervical cancer screening  Declines screening    Gastroesophageal reflux disease without esophagitis  Discussed how to diminish symptoms. Patient understands that it may take 1-2 weeks to experience an improvement in symptoms  --Enc to stop smoking  --Stop alcohol or at least drink no more than one drink per day  --Avoid eating large meals, do not eat late at night   --Avoid foods that trigger   --Elevate head of bed 2-3 inches  --Eat frequently  - famotidine (PEPCID) 20 MG tablet; Take 1 tablet (20 mg) by mouth At Bedtime    Prediabetes  Previous A1c is reviewed.  We will plan to start on metformin.  Educated on use and possible side effects.  We will recheck A1c-if is above 6.5 will plan to treat as type II diabetic  - metFORMIN (GLUCOPHAGE XR) 500 MG 24 hr tablet; Take 1 tablet (500 mg) by mouth daily (with dinner) for 14 days, THEN 1 tablet (500 mg) daily (with dinner) for 90 days.  - Albumin Random Urine Quantitative with Creat Ratio; Future  - Comprehensive metabolic panel; Future  - Hemoglobin A1c; Future  - Lipid panel reflex to direct LDL Fasting; Future  - TSH with free T4 reflex; Future    Hypertriglyceridemia  Previous lipids reviewed.  Encouraged to continue to be active.  Encouraged lower carbohydrate diet.  - rosuvastatin (CRESTOR) 20 MG tablet; Take 1 tablet (20 mg) by mouth daily    Chronic bilateral low back pain without sciatica  Previous pain management notes reviewed.  Pain gradually improving with medical marijuana and gabapentin.  Continue to follow with pain management.    Cannabis dependence (H)    Hypertension, unspecified type  Blood pressure controlled today in clinic.  We will plan to continue to monitor closely.    Morbid obesity (H)  Educated regarding need to continue to be active, monitor portions, decrease calorie intake.  Could improve prediabetes and hypertension.    Tobacco abuse  Continues to work on smoking cessation.  Is using nicotine gum.    Schizoaffective disorder,  "bipolar type (H)  Most recent mental health notes reviewed.  Continue to follow closely with mental health.    Patient has been advised of split billing requirements and indicates understanding: Yes    COUNSELING:  Reviewed preventive health counseling, as reflected in patient instructions       Regular exercise       Healthy diet/nutrition       Immunizations    Vaccinated for: COVID-19           Safe sex practices/STD prevention       Colorectal Cancer Screening       Consider Hep C screening for all patients one time for ages 18-79 years       HIV screeninx in teen years, 1x in adult years, and at intervals if high risk    Estimated body mass index is 44.02 kg/m  as calculated from the following:    Height as of this encounter: 1.549 m (5' 1\").    Weight as of this encounter: 105.7 kg (233 lb).    Weight management plan: Discussed healthy diet and exercise guidelines    He reports that he has been smoking dip, chew, snus or snuff, other, cigars, and cigarettes. He started smoking about 11 years ago. He has been smoking about 0.00 packs per day for the past 5.00 years. He quit smokeless tobacco use about 2 years ago.  His smokeless tobacco use included chew.  Nicotine/Tobacco Cessation Plan:   Pharmacotherapies : other Has prescription for gum      Counseling Resources:  ATP IV Guidelines  Pooled Cohorts Equation Calculator  Breast Cancer Risk Calculator  BRCA-Related Cancer Risk Assessment: FHS-7 Tool  FRAX Risk Assessment  ICSI Preventive Guidelines  Dietary Guidelines for Americans, 2010  USDA's MyPlate  ASA Prophylaxis  Lung CA Screening    BROOKLYN Cruz CNP  [facility]  "

## 2022-08-29 NOTE — TELEPHONE ENCOUNTER
Pharmacy calling, needs clarification on metformin Rx sent.   500 mg daily for 14 days and 500 mg daily for 90 days.    Assume was intended to be a lower starting dose but I don't see documentation of that plan in visit either.    Routed to Becki Avery, patient was seen today.    Pharmacy would like corrected Rx sent.    Edith Batista RN  [facility]

## 2022-08-29 NOTE — PATIENT INSTRUCTIONS
Please make appointment to see the dentist.    Plan to start on metformin.  Will gradually increase dose.  Metformin may cause you to have abdominal bloating, cramping and diarrhea.  Typically, this improves the longer that you are on it.    We will plan to start you on Crestor/rosuvastatin to help control your triglycerides.  Please let me know if you have any worsening muscle or joint aches.    Preventive Health Recommendations  Female Ages 26 - 39  Yearly exam:   See your health care provider every year in order to  Review health changes.   Discuss preventive care.    Review your medicines if you your doctor has prescribed any.    Until age 30: Get a Pap test every three years (more often if you have had an abnormal result).    After age 30: Talk to your doctor about whether you should have a Pap test every 3 years or have a Pap test with HPV screening every 5 years.   You do not need a Pap test if your uterus was removed (hysterectomy) and you have not had cancer.  You should be tested each year for STDs (sexually transmitted diseases), if you're at risk.   Talk to your provider about how often to have your cholesterol checked.  If you are at risk for diabetes, you should have a diabetes test (fasting glucose).  Shots: Get a flu shot each year. Get a tetanus shot every 10 years.   Nutrition:   Eat at least 5 servings of fruits and vegetables each day.  Eat whole-grain bread, whole-wheat pasta and brown rice instead of white grains and rice.  Get adequate Calcium and Vitamin D.     Lifestyle  Exercise at least 150 minutes a week (30 minutes a day, 5 days of the week). This will help you control your weight and prevent disease.  Limit alcohol to one drink per day.  No smoking.   Wear sunscreen to prevent skin cancer.  See your dentist every six months for an exam and cleaning.

## 2022-08-29 NOTE — TELEPHONE ENCOUNTER
Regina from patient:    Hello, I was wondering if I could get a refill on the gabapentin. So far I m on 600 300 600 and it s been helping a little.      Thanks,   Prakash    Gabapentin 300 MG  2 caps (600 MG) three times daily  pended for review.

## 2022-08-30 DIAGNOSIS — E78.1 HYPERTRIGLYCERIDEMIA: ICD-10-CM

## 2022-08-30 RX ORDER — ROSUVASTATIN CALCIUM 10 MG/1
10 TABLET, COATED ORAL DAILY
Qty: 90 TABLET | Refills: 3 | Status: ON HOLD | OUTPATIENT
Start: 2022-08-30 | End: 2023-06-08

## 2022-08-30 NOTE — TELEPHONE ENCOUNTER
Signed Prescriptions:                        Disp   Refills    gabapentin (NEURONTIN) 300 MG capsule      180 ca*1        Sig: Take 2 capsules (600 mg) by mouth 3 times daily  Authorizing Provider: TODD BUSTOS, RN CNP, FNP  [facility]

## 2022-09-01 DIAGNOSIS — R73.03 PREDIABETES: Primary | ICD-10-CM

## 2022-09-06 ENCOUNTER — TELEPHONE (OUTPATIENT)
Dept: FAMILY MEDICINE | Facility: CLINIC | Age: 32
End: 2022-09-06

## 2022-09-06 NOTE — TELEPHONE ENCOUNTER
Diabetes Education Scheduling Outreach #1:    Call to patient to schedule. Call cannot be completed as dialed.    Also sent [employer] message for second attempt. Requested patient to call to schedule.    Colleen Ward OnCall  Diabetes and Nutrition Scheduling

## 2022-09-12 ENCOUNTER — MYC MEDICAL ADVICE (OUTPATIENT)
Dept: PSYCHIATRY | Facility: CLINIC | Age: 32
End: 2022-09-12

## 2022-09-12 DIAGNOSIS — F25.0 SCHIZOAFFECTIVE DISORDER, BIPOLAR TYPE (H): ICD-10-CM

## 2022-09-12 RX ORDER — FLUPHENAZINE HYDROCHLORIDE 1 MG/1
TABLET ORAL
Qty: 60 TABLET | Refills: 1 | OUTPATIENT
Start: 2022-09-12

## 2022-09-13 ENCOUNTER — TRANSFERRED RECORDS (OUTPATIENT)
Dept: HEALTH INFORMATION MANAGEMENT | Facility: CLINIC | Age: 32
End: 2022-09-13

## 2022-09-15 ENCOUNTER — MYC MEDICAL ADVICE (OUTPATIENT)
Dept: FAMILY MEDICINE | Facility: CLINIC | Age: 32
End: 2022-09-15

## 2022-09-15 DIAGNOSIS — Z12.31 ENCOUNTER FOR SCREENING MAMMOGRAM FOR BREAST CANCER: Primary | ICD-10-CM

## 2022-09-19 ENCOUNTER — MEDICAL CORRESPONDENCE (OUTPATIENT)
Dept: HEALTH INFORMATION MANAGEMENT | Facility: CLINIC | Age: 32
End: 2022-09-19

## 2022-09-19 DIAGNOSIS — F90.2 ATTENTION DEFICIT HYPERACTIVITY DISORDER (ADHD), COMBINED TYPE: Primary | ICD-10-CM

## 2022-09-19 RX ORDER — DEXTROAMPHETAMINE SACCHARATE, AMPHETAMINE ASPARTATE MONOHYDRATE, DEXTROAMPHETAMINE SULFATE AND AMPHETAMINE SULFATE 3.75; 3.75; 3.75; 3.75 MG/1; MG/1; MG/1; MG/1
15 CAPSULE, EXTENDED RELEASE ORAL DAILY
Qty: 30 CAPSULE | Refills: 0 | Status: SHIPPED | OUTPATIENT
Start: 2022-09-19 | End: 2022-10-11

## 2022-09-21 ENCOUNTER — TRANSCRIBE ORDERS (OUTPATIENT)
Dept: OTHER | Age: 32
End: 2022-09-21

## 2022-09-21 DIAGNOSIS — H46.9 UNSPECIFIED OPTIC NEURITIS: Primary | ICD-10-CM

## 2022-09-22 ENCOUNTER — TELEPHONE (OUTPATIENT)
Dept: OPHTHALMOLOGY | Facility: CLINIC | Age: 32
End: 2022-09-22

## 2022-09-22 NOTE — ED NOTES
Pt has been walking the halls with one to one staff and has been calm and pleasant the entire shift.   Pt has been updated on plan of care.   Pt offered shower and declined at this time.   Pt's wife here at this time.  VSS   Spoke with patient and relayed message. Patient verbalized understanding and has no further questions at this time.

## 2022-09-22 NOTE — TELEPHONE ENCOUNTER
Called and LVM for Shanice     They can make an appointment with Dr. Beckham for next available    Ute Rader Communication Facilitator on 9/22/2022 at 1:38 PM

## 2022-09-22 NOTE — TELEPHONE ENCOUNTER
[facility] Call Center    Phone Message    May a detailed message be left on voicemail: yes     Reason for Call: Appointment Intake    Referring Provider Name: Referred by: Dr. Isael Mauricio @ [facility]  Phone: [phone], Fax: [phone]  Diagnosis and/or Symptoms:   Neuro Ophth w/Dr. Beckham for 2nd opinion of optic neuritis     Per the protocols for this dx, send encounter for clinic review    Action Taken: Message routed to:  [facility]    Travel Screening: Not Applicable

## 2022-10-06 DIAGNOSIS — F99 INSOMNIA DUE TO OTHER MENTAL DISORDER: ICD-10-CM

## 2022-10-06 DIAGNOSIS — F25.0 SCHIZOAFFECTIVE DISORDER, BIPOLAR TYPE (H): ICD-10-CM

## 2022-10-06 DIAGNOSIS — F51.05 INSOMNIA DUE TO OTHER MENTAL DISORDER: ICD-10-CM

## 2022-10-10 ENCOUNTER — TELEPHONE (OUTPATIENT)
Dept: PSYCHIATRY | Facility: CLINIC | Age: 32
End: 2022-10-10

## 2022-10-10 ENCOUNTER — HOSPITAL ENCOUNTER (EMERGENCY)
Facility: CLINIC | Age: 32
Discharge: HOME | End: 2022-10-10
Attending: FAMILY MEDICINE | Admitting: FAMILY MEDICINE
Payer: COMMERCIAL

## 2022-10-10 VITALS
SYSTOLIC BLOOD PRESSURE: 163 MMHG | HEART RATE: 102 BPM | DIASTOLIC BLOOD PRESSURE: 97 MMHG | TEMPERATURE: 98.8 F | OXYGEN SATURATION: 95 % | RESPIRATION RATE: 16 BRPM

## 2022-10-10 DIAGNOSIS — F25.9 SCHIZOAFFECTIVE DISORDER, SUBCHRONIC CONDITION (H): ICD-10-CM

## 2022-10-10 DIAGNOSIS — F41.9 ANXIETY: ICD-10-CM

## 2022-10-10 PROCEDURE — 250N000013 HC RX MED GY IP 250 OP 250 PS 637: Performed by: FAMILY MEDICINE

## 2022-10-10 PROCEDURE — 90791 PSYCH DIAGNOSTIC EVALUATION: CPT

## 2022-10-10 PROCEDURE — 99284 EMERGENCY DEPT VISIT MOD MDM: CPT | Performed by: FAMILY MEDICINE

## 2022-10-10 PROCEDURE — 250N000013 HC RX MED GY IP 250 OP 250 PS 637: Performed by: EMERGENCY MEDICINE

## 2022-10-10 PROCEDURE — 99285 EMERGENCY DEPT VISIT HI MDM: CPT | Mod: 25

## 2022-10-10 RX ORDER — CLONAZEPAM 1 MG/1
1 TABLET ORAL ONCE
Status: COMPLETED | OUTPATIENT
Start: 2022-10-10 | End: 2022-10-10

## 2022-10-10 RX ORDER — HYDROXYZINE HYDROCHLORIDE 50 MG/1
50 TABLET, FILM COATED ORAL ONCE
Status: COMPLETED | OUTPATIENT
Start: 2022-10-10 | End: 2022-10-10

## 2022-10-10 RX ORDER — OLANZAPINE 20 MG/1
TABLET ORAL
Qty: 30 TABLET | Refills: 1 | OUTPATIENT
Start: 2022-10-10

## 2022-10-10 RX ADMIN — CLONAZEPAM 1 MG: 1 TABLET ORAL at 18:34

## 2022-10-10 RX ADMIN — HYDROXYZINE HYDROCHLORIDE 50 MG: 50 TABLET ORAL at 17:20

## 2022-10-10 ASSESSMENT — ACTIVITIES OF DAILY LIVING (ADL): ADLS_ACUITY_SCORE: 35

## 2022-10-10 NOTE — TELEPHONE ENCOUNTER
Patient called to report uncontrollable anxiety for the last 4-5 days. Anxiety is lasting all day. Patient reports not eating, not sleeping well. States that he is only getting 5-6 hours of sleep per night. Patient is unable to identify any new stressors. No changes in medications. Patient state that he was close to going to the ED yesterday and thinks that he needs to go today. Patient states that TIPPs skills are not helpful. Medication have not been helpful. Patient has appointment with mattie tomorrow but feels like he needs to be seen today and will go to the ED at [place]. Denies thoughts of self harm, thoughts of harming others, and suicidal ideation.  Update sent to provider. Update sent to Carrillo in patient placement.

## 2022-10-10 NOTE — ED PROVIDER NOTES
[facility] EMERGENCY DEPARTMENT ([facility])    10/10/22     S    History     Chief Complaint   Patient presents with     Anxiety     Suicidal     Started four days ago, getting worse today.  No plan     HPI  Ayana Prasad is a 32 year old transgender male (he/him pronouns) who presents with increased anxiety over the past 4-5 days with poor sleep and oral intake.  He has only been sleeping 5-6 hours a night.  Patient states they have not been able to identify any particular stressor or trigger.  They have not found their medications helpful currently.  No recent med changes, was previously on hydroxyzine and propanolol which were discontinued due to either side effects or lack of effectiveness.  Was on Klonopin which they found helpful but in conjunction with their physician decided this was not a good long-term option.  They admit to having suicidal thoughts but states these are passive and they have never had a plan or intent for suicide.  Currently they feel they can be safe and would like to go home to be with their Labrador retriever puppy.  Patient is followed by Regine Last NP and has an appointment with her tomorrow but feels that he needs to be seen more emergently due to the worsening anxiety.     Past Medical History  Past Medical History:   Diagnosis Date     ADHD (attention deficit hyperactivity disorder)      Bipolar 1 disorder      Borderline personality disorder      Cauda equina syndrome      Chronic low back pain      Depression      GERD (gastroesophageal reflux disease)      h/o TBI (traumatic brain injury)      Hypertension, unspecified type 12/16/2021     Marginal corneal ulcer, left 07/17/2015     Nephrolithiasis      obesity      Polysubstance abuse - methamphetamine, hallucinagen, heroin, marijuana     currently in remission     PONV (postoperative nausea and vomiting)      PTSD (post-traumatic stress disorder)      Past Surgical History:   Procedure Laterality Date      BACK SURGERY  12/24/2016     BACK SURGERY - For Cauda Equina Syndrome  12/24/2016     COLONOSCOPY       COMBINED CYSTOSCOPY, INSERT STENT URETER(S) Left 8/30/2018    Procedure: COMBINED CYSTOSCOPY, INSERT STENT URETER(S);  Cystoscopy With Left Stent Placement;  Surgeon: Kiran Ulrich MD;  Location: [facility] OR     COMBINED CYSTOSCOPY, RETROGRADES, EXCHANGE STENT URETER(S) Left 10/14/2018    Procedure: COMBINED CYSTOSCOPY, RETROGRADES, EXCHANGE STENT URETER(S);  Cystoscopy and removal of left-sided stent.;  Surgeon: Stiven Olivo MD;  Location:  OR     COMBINED CYSTOSCOPY, RETROGRADES, URETEROSCOPY, INSERT STENT  1/6/2014    Procedure: COMBINED CYSTOSCOPY, RETROGRADES, URETEROSCOPY, INSERT STENT;  Cystyoscopy place left ureteral stent;  Surgeon: Jaun Kimble MD;  Location: [facility] OR     COMBINED CYSTOSCOPY, RETROGRADES, URETEROSCOPY, INSERT STENT Left 10/23/2018    Procedure: Video cystoscopy, left ureteroscopy with stone extraction;  Surgeon: Bull Mast MD;  Location:  OR     CYSTOSCOPY, URETEROSCOPY, COMBINED Right 9/23/2015    Procedure: COMBINED CYSTOSCOPY, URETEROSCOPY;  Surgeon: ROME Jett MD;  Location: [facility] OR     ENT SURGERY       ESOPHAGOSCOPY, GASTROSCOPY, DUODENOSCOPY (EGD), COMBINED  4/8/2013    Procedure: COMBINED ESOPHAGOSCOPY, GASTROSCOPY, DUODENOSCOPY (EGD), BIOPSY SINGLE OR MULTIPLE;  Gastroscopy;  Surgeon: Peter Pickard MD;  Location: [facility] GI     ESOPHAGOSCOPY, GASTROSCOPY, DUODENOSCOPY (EGD), COMBINED Left 10/28/2014    Procedure: COMBINED ESOPHAGOSCOPY, GASTROSCOPY, DUODENOSCOPY (EGD), BIOPSY SINGLE OR MULTIPLE;  Surgeon: Narcisa Ramirez MD;  Location:  OR     ESOPHAGOSCOPY, GASTROSCOPY, DUODENOSCOPY (EGD), COMBINED N/A 12/24/2018    Procedure: COMBINED ESOPHAGOSCOPY, GASTROSCOPY, DUODENOSCOPY (EGD), BIOPSY SINGLE OR MULTIPLE;  Surgeon: Sonu Verduzco MD;  Location: [facility] GI     INJECT EPIDURAL TRANSFORAMINAL LUMBAR / SACRAL EA ADDITIONAL LEVEL Left  "3/18/2021    Procedure: Left L5/S1 transforaminal injection for selective L5 nerve root block;  Surgeon: Eliza Pagan MD;  [place] [facility] [place]     LAPAROSCOPIC CHOLECYSTECTOMY  11/20/2014    [facility], Dr. Ramirez     LASER HOLMIUM LITHOTRIPSY URETER(S), INSERT STENT, COMBINED  4/2/2014    Procedure: COMBINED CYSTOSCOPY, URETEROSCOPY, LASER HOLMIUM LITHOTRIPSY URETER(S), INSERT STENT;  Cystoscopy,Left Ureteral Stent Removal,Left Ureteroscopy with Laser Lithotripsy,Left Ureter Stent Placement;  Surgeon: ROME Jett MD;  Location: [facility] OR     Transurethral stone resection  03/11/2011     amphetamine-dextroamphetamine (ADDERALL XR) 15 MG 24 hr capsule  benzoyl peroxide 5 % external liquid  doxycycline monohydrate (MONODOX) 100 MG capsule  famotidine (PEPCID) 20 MG tablet  gabapentin (NEURONTIN) 300 MG capsule  lithium ER (LITHOBID) 300 MG CR tablet  Melatonin 10 MG TABS tablet  metaxalone (SKELAXIN) 800 MG tablet  metFORMIN (GLUCOPHAGE XR) 500 MG 24 hr tablet  nicotine (NICORETTE) 2 MG gum  OLANZapine (ZYPREXA) 20 MG tablet  rosuvastatin (CRESTOR) 10 MG tablet  testosterone cypionate (DEPOTESTOSTERONE) 200 MG/ML injection  tretinoin (RETIN-A) 0.025 % external cream  metFORMIN (GLUCOPHAGE XR) 500 MG 24 hr tablet  needle, disp, 18G X 1\" MISC  Needle, Disp, 25G X 5/8\" MISC  syringe, disposable, 1 ML MISC      Allergies   Allergen Reactions     Haldol [Haloperidol] Other (See Comments)     Makes patient very angry and anxious     Adhesive Tape Hives     Percocet [Oxycodone-Acetaminophen] Nausea and Vomiting     Prednisone Other (See Comments) and Hives     Suicidal ideation     Risperidone Other (See Comments)     Tramadol Hcl Nausea and Vomiting     Droperidol Anxiety     Seroquel [Quetiapine] Palpitations     Spent 2 weeks in the hospital due to having seroquel, caused palpitations and QT prolongation     Family History  Family History   Problem Relation Age of Onset     Gastrointestinal Disease Father       "   Crohn's Disease     Cancer Father         Liver Cancer     Other Cancer Father         Liver     Obesity Father      Cancer Maternal Grandmother         lympoma     Diabetes Maternal Grandmother      Mental Illness Maternal Grandmother      Other Cancer Maternal Grandmother         Non Hodgkins Lymphoma     Diabetes Maternal Grandfather      Hypertension Maternal Grandfather      Prostate Cancer Maternal Grandfather      Hyperlipidemia Maternal Grandfather      Heart Disease Paternal Grandfather         heart disease     Hypertension Brother      Other Cancer Brother         Esophagecial     Diabetes Brother      Obesity Brother      Hyperlipidemia Mother      Mental Illness Mother      Anxiety Disorder Mother      Anesthesia Reaction Mother         Vomiting/Nausea     Other Cancer Mother      Hypertension Brother      Other Cancer Brother      Other Cancer Paternal Half-Brother      No Known Problems Sister      Social History   Social History     Tobacco Use     Smoking status: Every Day     Packs/day: 0.00     Years: 5.00     Pack years: 0.00     Types: Dip, chew, snus or snuff, Other, Cigars, Cigarettes     Start date: 8/1/2011     Smokeless tobacco: Former     Types: Chew     Quit date: 12/17/2019   Vaping Use     Vaping Use: Former     Substances: Nicotine, Flavoring     Devices: [email or web address]   Substance Use Topics     Alcohol use: No     Drug use: Not Currently     Types: Marijuana, Opiates     Comment: oxy, heroin (smoke)      Past medical history, past surgical history, medications, allergies, family history, and social history were reviewed with the patient. No additional pertinent items.       Review of Systems  A complete review of systems was performed with pertinent positives and negatives noted in the HPI, and all other systems negative.    Physical Exam   BP: (!) 134/93  Pulse: 98  Temp: 98.8  F (37.1  C)  Resp: 16  SpO2: 97 %  Physical Exam  Vitals and nursing note reviewed.   Constitutional:        General: He is not in acute distress.     Appearance: He is not diaphoretic.   HENT:      Head: Atraumatic.      Mouth/Throat:      Pharynx: No oropharyngeal exudate.   Eyes:      General: No scleral icterus.     Pupils: Pupils are equal, round, and reactive to light.   Cardiovascular:      Heart sounds: Normal heart sounds.   Pulmonary:      Effort: No respiratory distress.      Breath sounds: Normal breath sounds.   Abdominal:      General: Bowel sounds are normal.      Palpations: Abdomen is soft.      Tenderness: There is no abdominal tenderness.   Musculoskeletal:         General: No tenderness.   Skin:     General: Skin is warm.      Findings: No rash.   Neurological:      General: No focal deficit present.      Mental Status: He is oriented to person, place, and time. Mental status is at baseline.   Psychiatric:         Attention and Perception: Attention normal.         Mood and Affect: Mood is anxious.         Speech: Speech normal.         Behavior: Behavior normal.         Thought Content: Thought content includes suicidal ideation. Thought content does not include suicidal plan.         Cognition and Memory: Cognition normal.         Judgment: Judgment normal.      Comments: Patient denies any plan or intent for suicide and states they are able to contract for safety         ED Course      Procedures       The medical record was reviewed and interpreted.              No results found for any visits on 10/10/22.  Medications   clonazePAM (klonoPIN) tablet 1 mg (has no administration in time range)   hydrOXYzine (ATARAX) tablet 50 mg (50 mg Oral Given 10/10/22 1720)        Assessments & Plan (with Medical Decision Making)   32-year-old female to 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 patient with a history of anxiety here due to 4 to 5 days of worsening anxiety, insomnia, passive suicidal thoughts.  No definite specified trigger.  The patient was also seen by the [facility] , please refer to their extensive  note/evaluation which was reviewed with me and is documented in EPIC on 10/10/2022 for further details.  Patient stated they would feel better if they were released to home.  They were contracted for safety both to the  and myself.  They have appointments with her providers tomorrow, and would also be interested in a day treatment referral.  They are given 1 mg of Klonopin p.o. for assistance today but both the patient and myself agree this would not be a good long-term solution.  Patient states that will return to the ED if it anytime they feel unsafe.  We discussed the indications for emergency department return and follow-up.  Stable for discharge.      I have reviewed the nursing notes. I have reviewed the findings, diagnosis, plan and need for follow up with the patient.    New Prescriptions    No medications on file       Final diagnoses:   Anxiety       --  Mark Aponte MD  [facility] EMERGENCY DEPARTMENT  10/10/2022     Mark Aponte MD  10/10/22 1822

## 2022-10-10 NOTE — DISCHARGE INSTRUCTIONS
Mental Health Appointment    A diagnostic assessment has been scheduled for the [facility] on Wednesday, 10/12/22 at 10:30 AM. This appointment will be conducted via video.  If you need to reschedule or cancel this appointment, please call Behavioral Access at [phone]. If you must cancel, please call at least 24 hours prior to your scheduled appointment.    Aftercare Plan  If I am feeling unsafe or I am in a crisis, I will:   Contact my established care providers   Call the [place] Suicide Prevention Lifeline: [phone]   Go to the nearest emergency room   Call [phone]     Warning signs that I or other people might notice when a crisis is developing for me: I am having increasing suicidal thoughts that turn to plans with intent or means, I am having additional urges to self-harm, my emotions are of hopelessness, feeling like there's no way out, rage or anger, engaging in risky activities without thinking, withdrawing from family/friends, dramatic mood swings, drastic personality changes, use of alcohol or drugs, neglect of personal hygiene or cares       Things I am able to do on my own to cope or help me feel better: Exercise, listen to music, practice deep breathing, meditations, write in a journal, self-regulate, self-check-in, ask for help when needed       Things that I am able to do with others to cope or help me feel better: Spending quality time with loved ones, Pet my animal, Staying hydrated, Eating balanced meals, Going for a walk every day, Take care of daily responsibilities/needs, Focus on positive self-talk vs negative self-talk       Things I can use or do for distraction: Reach out to/spend time with family and friends, take a warm shower or bath, Exercise, chores or do a project, listen to music, watch movie/TV, journaling, reading a book, meditating, call a friend       Changes I can make to support my mental health and wellness: I will abstain from all  "mood altering chemicals not currently prescribed to me I will attend scheduled mental health therapy and psychiatric appointments and follow all recommendations      -I will commit to 30 minutes of self care daily - this can be as simple as taking a shower, going for a walk, cooking a meal, read, writing, etc      -I will practice square breathing when I begin to feel anxious - in breath through the nose for the count of 4 and the first line on the square. Out breath through the mouth for the count of 4 for the second line of the square. Repeat to complete the square. Repeat the square as many times as needed.      -I will use distraction skills of: going for walks, watching TV, spending time outside, calling a friend or family member      -Use community resources, including hotline numbers, Critical access hospital crisis and support meetings         People in my life that I can ask for help: Parents, Therapist    Your Critical access hospital has a mental health crisis team you can call 24/7: [facility]  [phone]     Other things that are important when I m in crisis: Remember help is available, follow up with established providers, attend all appointments, take medications as prescribed??     Appointment information and/or additional resources available to me:        Crisis Lines  Crisis Text Line  Text 995212  You will be connected with a trained live crisis counselor to provide support.    Por espanol, texto  CHARITY a 997793 o texto a 442-AYUDAME en WhatsApp    [place] Hope Line  1.800.SUICIDE [3066861]      Community Resources  Fast Tracker  Linking people to mental health and substance use disorder resources  fasttrackermn.org     [place] Mental Health Warm Line  Peer to peer support  Monday thru Saturday, 12 pm to 10 pm  665.106.1785 or 4.188.258.8275  Text \"Support\" to 17580    National [place] [employer] Mental Illness (GILMA)  214.153.2883 or 1.888.GILMA.HELPS        Mental Health Apps  My3  " https://myBeam Expresspp.org/    VirtualHopeBox  https://[employer]/apps/virtual-hope-box/      Additional Information  Today you were seen by a licensed mental health professional through Triage and Transition services, [facility] (P)  for a crisis assessment in the Emergency Department at [facility].  It is recommended that you follow up with your established providers (psychiatrist, mental health therapist, and/or primary care doctor - as relevant) as soon as possible. Coordinators from [facility] will be calling you in the next 24-48 hours to ensure that you have the resources you need.  You can also contact [facility] coordinators directly at [phone]. You may have been scheduled for or offered an appointment with a mental health provider. [facility] maintains an extensive network of licensed behavioral health providers to connect patients with the services they need.  We do not charge providers a fee to participate in our referral network.  We match patients with providers based on a patient's specific needs, insurance coverage, and location.  Our first effort will be to refer you to a provider within your care system, and will utilize providers outside your care system as needed.
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
## 2022-10-10 NOTE — ED TRIAGE NOTES
Triage Assessment     Row Name 10/10/22 5632       Triage Assessment (Adult)    Airway WDL WDL       Respiratory WDL    Respiratory WDL WDL       Skin Circulation/Temperature WDL    Skin Circulation/Temperature WDL WDL       Cardiac WDL    Cardiac WDL WDL       Peripheral/Neurovascular WDL    Peripheral Neurovascular WDL WDL       Cognitive/Neuro/Behavioral WDL    Cognitive/Neuro/Behavioral WDL X;mood/behavior    Mood/Behavior anxious

## 2022-10-11 ENCOUNTER — VIRTUAL VISIT (OUTPATIENT)
Dept: PSYCHIATRY | Facility: CLINIC | Age: 32
End: 2022-10-11
Attending: NURSE PRACTITIONER
Payer: COMMERCIAL

## 2022-10-11 DIAGNOSIS — F25.0 SCHIZOAFFECTIVE DISORDER, BIPOLAR TYPE (H): ICD-10-CM

## 2022-10-11 DIAGNOSIS — F99 INSOMNIA DUE TO OTHER MENTAL DISORDER: ICD-10-CM

## 2022-10-11 DIAGNOSIS — F51.05 INSOMNIA DUE TO OTHER MENTAL DISORDER: ICD-10-CM

## 2022-10-11 DIAGNOSIS — F25.0 SCHIZOAFFECTIVE DISORDER, BIPOLAR TYPE (H): Primary | ICD-10-CM

## 2022-10-11 DIAGNOSIS — F41.9 ANXIETY: ICD-10-CM

## 2022-10-11 DIAGNOSIS — R25.1 TREMOR: ICD-10-CM

## 2022-10-11 DIAGNOSIS — F90.2 ATTENTION DEFICIT HYPERACTIVITY DISORDER (ADHD), COMBINED TYPE: ICD-10-CM

## 2022-10-11 PROCEDURE — 99215 OFFICE O/P EST HI 40 MIN: CPT | Mod: 95 | Performed by: NURSE PRACTITIONER

## 2022-10-11 RX ORDER — OLANZAPINE 20 MG/1
TABLET ORAL
Qty: 30 TABLET | Refills: 0 | Status: SHIPPED | OUTPATIENT
Start: 2022-10-11 | End: 2022-11-15

## 2022-10-11 RX ORDER — LITHIUM CARBONATE 300 MG/1
900 TABLET, FILM COATED, EXTENDED RELEASE ORAL AT BEDTIME
Qty: 90 TABLET | Refills: 1 | Status: SHIPPED | OUTPATIENT
Start: 2022-10-11 | End: 2022-11-15

## 2022-10-11 RX ORDER — DEXTROAMPHETAMINE SACCHARATE, AMPHETAMINE ASPARTATE MONOHYDRATE, DEXTROAMPHETAMINE SULFATE AND AMPHETAMINE SULFATE 3.75; 3.75; 3.75; 3.75 MG/1; MG/1; MG/1; MG/1
15 CAPSULE, EXTENDED RELEASE ORAL DAILY
Qty: 30 CAPSULE | Refills: 0 | Status: SHIPPED | OUTPATIENT
Start: 2022-10-23 | End: 2022-10-20 | Stop reason: SINTOL

## 2022-10-11 RX ORDER — PROPRANOLOL HYDROCHLORIDE 10 MG/1
10 TABLET ORAL DAILY
Qty: 30 TABLET | Refills: 1 | Status: SHIPPED | OUTPATIENT
Start: 2022-10-11 | End: 2022-11-02

## 2022-10-11 ASSESSMENT — PATIENT HEALTH QUESTIONNAIRE - PHQ9
SUM OF ALL RESPONSES TO PHQ QUESTIONS 1-9: 4
SUM OF ALL RESPONSES TO PHQ QUESTIONS 1-9: 4
10. IF YOU CHECKED OFF ANY PROBLEMS, HOW DIFFICULT HAVE THESE PROBLEMS MADE IT FOR YOU TO DO YOUR WORK, TAKE CARE OF THINGS AT HOME, OR GET ALONG WITH OTHER PEOPLE: VERY DIFFICULT

## 2022-10-11 ASSESSMENT — ANXIETY QUESTIONNAIRES
IF YOU CHECKED OFF ANY PROBLEMS ON THIS QUESTIONNAIRE, HOW DIFFICULT HAVE THESE PROBLEMS MADE IT FOR YOU TO DO YOUR WORK, TAKE CARE OF THINGS AT HOME, OR GET ALONG WITH OTHER PEOPLE: VERY DIFFICULT
GAD7 TOTAL SCORE: 8
7. FEELING AFRAID AS IF SOMETHING AWFUL MIGHT HAPPEN: SEVERAL DAYS
3. WORRYING TOO MUCH ABOUT DIFFERENT THINGS: SEVERAL DAYS
5. BEING SO RESTLESS THAT IT IS HARD TO SIT STILL: SEVERAL DAYS
7. FEELING AFRAID AS IF SOMETHING AWFUL MIGHT HAPPEN: SEVERAL DAYS
1. FEELING NERVOUS, ANXIOUS, OR ON EDGE: NEARLY EVERY DAY
GAD7 TOTAL SCORE: 8
GAD7 TOTAL SCORE: 8
6. BECOMING EASILY ANNOYED OR IRRITABLE: NOT AT ALL
4. TROUBLE RELAXING: SEVERAL DAYS
8. IF YOU CHECKED OFF ANY PROBLEMS, HOW DIFFICULT HAVE THESE MADE IT FOR YOU TO DO YOUR WORK, TAKE CARE OF THINGS AT HOME, OR GET ALONG WITH OTHER PEOPLE?: VERY DIFFICULT
2. NOT BEING ABLE TO STOP OR CONTROL WORRYING: SEVERAL DAYS

## 2022-10-11 NOTE — PROGRESS NOTES
Ayana Prasad is a 32 year old who has consented to receive services via billable video visit.      Pt will join video visit via: [employer]Well  If there are problems joining the visit, send backup video invite via: Send to preferred e-mail: [email or web address]      Originating Location (patient location): Patient's home  Distant Location (provider location): [facility] [place] [facility]    Will anyone else be joining the video visit? No  Answers for HPI/ROS submitted by the patient on 10/11/2022  If you checked off any problems, how difficult have these problems made it for you to do your work, take care of things at home, or get along with other people?: Very difficult  PHQ9 TOTAL SCORE: 4  AVA 7 TOTAL SCORE: 8

## 2022-10-11 NOTE — PATIENT INSTRUCTIONS
-Start Propranolol 10 mg daily for tremor and anxiety.  Monitor your blood pressure and if it is lower than 90/60, hold the medication.  -Continue all other medication regimen for now.  Regine will message you on Clozapine start.    -Recommend SAD light 10.000 LUX for seasonal depression.  Use it first thing in the morning for 15-30 min while monitoring for irritability.  If irritability occurs, to reduce the duration.  Place the light box on a desk or table, and sit in front of it at the specified distance. You can do this while you read, eat breakfast, or work at a computer. The light should reach your eyes, but don't stare at the light box.   https://[employer]/  -Start Vitamin D 2000 international unit(s) daily.    Your next appointment is scheduled on 11/15/2022 (Tue) at 2:30pm.  If you are in intensive outpatient program, you can cancel this appointment and follow up with psychiatrist in the program.        **For crisis resources, please see the information at the end of this document**   Patient Education    Thank you for coming to the [facility] & [facility] [place] [facility].     Lab Testing:  If you had lab testing today and your results are reassuring or normal they will be mailed to you or sent through Hydrocision within 7 days. If the lab tests need quick action we will call you with the results. The phone number we will call with results is # [phone]. If this is not the best number please call our clinic and change the number.     Medication Refills:  If you need any refills please call your pharmacy and they will contact us. Our fax number for refills is [phone].   Three business days of notice are needed for general medication refill requests.   Five business days of notice are needed for controlled substance refill requests.   If you need to change to a different pharmacy, please contact the new pharmacy directly. The new pharmacy will help you get your medications  transferred.     Contact Us:  Please call [phone] during business hours (8-5:00 M-F).   If you have medication related questions after clinic hours, or on the weekend, please call [phone].     Financial Assistance [phone]   Medical Records [phone]       MENTAL HEALTH CRISIS RESOURCES:  For a emergency help, please call 911 or go to the nearest Emergency Department.     Emergency Walk-In Options:   EmPATH Unit @ [facility] ([place]): [phone] - Specialized mental health emergency area designed to be calming  [facility] ([place]): [phone]  [facility] [place] [facility] ([place]): [phone]  [facility] ([place]): [phone]    [facility] Crisis Information:   [place]: [phone]  [place]: [phone]  [place] ([place]) - Adult: [phone]     Child: [phone]  [place] - Adult: [phone]     Child: [phone]  [place]: [phone]  List of all [facility] resources:   https://[place].gov/dhs/people-we-serve/adults/health-care/mental-health/resources/crisis-contacts.jsp    National Crisis Information:   Crisis Text Line: Text  [place]  to 437542  Suicide & Crisis Lifeline: 988  National Suicide Prevention Lifeline: 6-325-482-TALK (1-496.800.5847)       For online chat options, visit https://suicidepreventionlifeline.org/chat/  Poison Control Center: 1-556.297.9158  Trans Lifeline: 1-496-711-8290 - Hotline for transgender people of all ages  The [employer] Project: 1-712-557-4296 - Hotline for LGBT youth     For Non-Emergency Support:   Fast Tracker: Mental Health & Substance Use Disorder Resources -   https://www.[employer].org/

## 2022-10-11 NOTE — TELEPHONE ENCOUNTER
OLANZapine (ZYPREXA) 20 MG  Last refilled: 8/24/22  Qty: 30 : 1    Last seen: 10/11/22  RTC:  1 MOS  Cancel: 0  No-show: 0  Next appt: 11/15/22  Refill pended and routed to the provider for review/determination due to : last note 10/11  Unsigned

## 2022-10-12 ENCOUNTER — TELEPHONE (OUTPATIENT)
Dept: BEHAVIORAL HEALTH | Facility: CLINIC | Age: 32
End: 2022-10-12

## 2022-10-12 NOTE — TELEPHONE ENCOUNTER
Patient have a video appointment today at 10:30am with [facility]. Writer placed a call this morning to check in patient. Unable to get a hold of patient. Writer left a voicemail with writer's call back number.

## 2022-10-14 ENCOUNTER — VIRTUAL VISIT (OUTPATIENT)
Dept: PHARMACY | Facility: CLINIC | Age: 32
End: 2022-10-14
Attending: NURSE PRACTITIONER
Payer: COMMERCIAL

## 2022-10-14 DIAGNOSIS — F90.9 ADHD (ATTENTION DEFICIT HYPERACTIVITY DISORDER): ICD-10-CM

## 2022-10-14 DIAGNOSIS — R73.03 PREDIABETES: ICD-10-CM

## 2022-10-14 DIAGNOSIS — E78.5 HYPERLIPIDEMIA LDL GOAL <100: ICD-10-CM

## 2022-10-14 DIAGNOSIS — F25.0 SCHIZOAFFECTIVE DISORDER, BIPOLAR TYPE (H): Primary | ICD-10-CM

## 2022-10-14 DIAGNOSIS — L70.9 ACNE: ICD-10-CM

## 2022-10-14 DIAGNOSIS — K21.9 GASTROESOPHAGEAL REFLUX DISEASE WITHOUT ESOPHAGITIS: ICD-10-CM

## 2022-10-14 DIAGNOSIS — Z79.899 TRANSGENDER MAN ON HORMONE THERAPY: ICD-10-CM

## 2022-10-14 DIAGNOSIS — R25.1 TREMOR: ICD-10-CM

## 2022-10-14 DIAGNOSIS — F41.1 GAD (GENERALIZED ANXIETY DISORDER): ICD-10-CM

## 2022-10-14 DIAGNOSIS — F64.0 TRANSGENDER MAN ON HORMONE THERAPY: ICD-10-CM

## 2022-10-14 DIAGNOSIS — M54.9 BACK PAIN: ICD-10-CM

## 2022-10-14 PROCEDURE — 99605 MTMS BY PHARM NP 15 MIN: CPT | Mod: 93 | Performed by: PHARMACIST

## 2022-10-14 PROCEDURE — 99607 MTMS BY PHARM ADDL 15 MIN: CPT | Mod: 93 | Performed by: PHARMACIST

## 2022-10-14 NOTE — Clinical Note
Jarad Weiner,   Thank you for referring this patient! He is working with his  to see if he can get an in-home RN for weekly clozapine ANC draws. I also think it would be a good idea to get another EKG before starting clozapine since his one from a couple months ago showed QTC >450ms. I am not sure the best way to have him get an EKG done? Have you done this with your remote patients?  He is going to reach out once he hears back about in home RN - if he can't get that, his plan would be to go to [facility] for weekly labs.   Let me know if you have questions. I am happy to work with him on clozapine dose titration as we get him going on it.   Thank you,   Alice Tubbs, PharmD, BCPP Medication Therapy Management Pharmacist [facility]

## 2022-10-17 ENCOUNTER — TELEPHONE (OUTPATIENT)
Dept: PSYCHIATRY | Facility: CLINIC | Age: 32
End: 2022-10-17

## 2022-10-17 DIAGNOSIS — F90.2 ATTENTION DEFICIT HYPERACTIVITY DISORDER (ADHD), COMBINED TYPE: Primary | ICD-10-CM

## 2022-10-17 RX ORDER — DEXTROAMPHETAMINE SACCHARATE, AMPHETAMINE ASPARTATE MONOHYDRATE, DEXTROAMPHETAMINE SULFATE AND AMPHETAMINE SULFATE 3.75; 3.75; 3.75; 3.75 MG/1; MG/1; MG/1; MG/1
15 CAPSULE, EXTENDED RELEASE ORAL DAILY
Qty: 5 CAPSULE | Refills: 0 | Status: SHIPPED | OUTPATIENT
Start: 2022-10-17 | End: 2022-10-20 | Stop reason: SINTOL

## 2022-10-17 NOTE — PATIENT INSTRUCTIONS
"Recommendations from today's MTM visit:                                                      1. Let us know what you hear back from your  about in-home RN services for weekly clozapine draws.   2. I would recommend a baseline EKG before starting clozapine - I will talk to Regine about how to order this.  3. Once we have a plan for lab draws and the EKG is done, I will reach out about the specifics of dosing for your clozapine.     Follow-up: MTM follow-up timing pending clozapine lab plan.     It was great speaking with you today.  I value your experience and would be very thankful for your time in providing feedback in our clinic survey. In the next few days, you may receive an email or text message from [employer] with a link to a survey related to your  clinical pharmacist.\"     To schedule another MTM appointment, please call the clinic directly or you may call the MTM scheduling line at [phone] or toll-free at [phone].     My Clinical Pharmacist's contact information:                                                      Please feel free to contact me with any questions or concerns you have.      Alice Tubbs, PharmD, BCPP  Medication Therapy Management Pharmacist  [facility]     "
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
## 2022-10-17 NOTE — PROGRESS NOTES
Medication Therapy Management (MTM) Encounter    ASSESSMENT:                              Schizoaffective disorder, bipolar type; ADHD; anxiety: Reviewed clozapine side effects (constipation, metabolic, sialorrhea, sedation, agranulocytosis) and required monitoring (ANC Q1 week x6 months, then Q2 weeks x6 months, then monthly). He is already working with his  on getting in-home RN services for lab draws. I encouraged patient to contact us once he knows if this is an option. If not, he will go to [facility] lab. Patient will need to obtain baseline ANC within 7 days prior to starting clozapine. Would also recommend baseline EKG given h/o QTC >450ms. Generally recommend starting clozapine at 12.5mg nightly then increasing by 25-50mg every 2-3 days. Could consider titration as follows:   Day 1: clozapine 12.5mg nightly  Day 2 and 3: clozapine 25mg nightly  Day 4 and 5: clozapine 50mg nightly  Day 6 and 7: clozapine 75mg nightly   Day 8 and 9: clozapine 100mg nightly  Would reassess at this point and proceed with titration to target 200mg daily based on symptoms/side effects. Pending efficacy/side effects, could consider reducing olanzapine once clozapine 100mg nightly is reached. MTM is happy to follow-up to help guide subsequent dose increases/cross-taper. Finally, we discussed risk of anxiety and possible worsening psychosis symptoms with Adderall. Strattera would have lower risk of these side effects, however patient is not interested in making a change to Strattera at this time.      Tremor: Plan in place to start propranolol.    Prediabetes:  Stable. Recommend A1C/glucose recheck ~11/29/22 since we will be starting clozapine.    Hyperlipidemia: Stable. Recommend A1C/glucose recheck ~11/29/22 since we will be starting clozapine.    Back Pain: Stable.  Improved with starting gabapentin.    Gender-affirming hormone therapy: Continue monitoring with [facility].  Do not see recent hormone  "levels on file. Target testosterone level 300-1000ng/dL.    GERD: Stable    Acne: Stable    PLAN:                            1. Let us know what you hear back from your  about in-home RN services for weekly clozapine draws.   2. I would recommend a baseline EKG before starting clozapine.  3. Once we have a plan for lab draws and the EKG is done, I will reach out about the specifics of dosing for your clozapine.     Follow-up: MTM follow-up timing pending clozapine lab plan.     SUBJECTIVE/OBJECTIVE:                          Ayana Prasad \"Prakash\" is a 32 year old adult called for an initial visit. He was referred to me from psychiatry.      Reason for referral: \"Clozaril start\".    Allergies/ADRs: Reviewed in chart  Past Medical History: Reviewed in chart  Tobacco: Reports he has been tobacco free for the last month with the use of nicotine gum  Alcohol: none    Schizoaffective disorder, bipolar type; ADHD; anxiety:   Current medications:   Lithium  mg nightly  Olanzapine 20 mg nightly  Adderall XR 15 mg daily    Prakash is seen at [facility] by BROKOLYN Calvin . Most recent visit was 10/11/22 at which time they discussed starting clozapine. Patient was referred to MTM to help coordinate clozapine start. Please see note by pt's provider for additional details regarding symptoms and history.     Today, pt reports he has multiple past medication trials and ongoing auditory hallucinations. He also endorses anxiety and panic which have worsened over the last several weeks. He has been on olanzapine + lithium for several years and is interested in starting clozapine. He restarted Adderall in July 2022 for inattentiveness, impulsivity, and trouble with focus. He does find this helpful and doesn't think it has contributed to his anxiety. He has tried Strattera in the past and didn't find it helpful. He is aware of need for weekly lab draws for the first 6 months on " clozapine and has already reached out to his  about arranging in home RN services. If this isn't an option, he would like to go to [facility] for lab draws.  He denies any current medication side effects other than tremor from lithium.       Lab Results   Component Value Date    LITHIUM 0.6 05/27/2022    LITHIUM 0.9 11/26/2021    LITHIUM 0.86 11/13/2020     QTc = 440 on 3/17/2022,  459 on 5/5/2022     Tremor:   Current medications:   Propranolol 10 mg daily    Prakash reports this was just prescribed for lithium-induced tremor.  He has not yet started it, but plans to as soon as he gets it from the pharmacy.    Prediabetes:  Currently taking metformin XR 1000 mg daily. Patient is not experiencing side effects.    Lab Results   Component Value Date    A1C 6.4 08/29/2022    A1C 5.9 10/06/2021    A1C 5.8 03/19/2021    A1C 5.8 11/05/2018    A1C 5.8 10/03/2017         Hyperlipidemia: Current therapy includes rosuvatatin 10mg daily.  Patient reports no significant myalgias or other side effects.    Recent Labs   Lab Test 08/29/22  1558 08/16/22  1259   CHOL 162 191   HDL 50 41   LDL 78  --    TRIG 170* 545*       Back Pain:   Current medications:  Gabapentin 900 mg 3 times daily  Metaxalone 800 mg 3 times daily as needed    Prakash reports gabapentin was recently started.  He increased to 900 mg 3 times daily a couple days ago and feels this has been very effective.  It is currently managed by pain medicine.  He has not been needing to take metaxalone, last use was several months ago.  Denies medication side effects.     Gender-affirming hormone therapy:   Current medication:  Testosterone IM 20 mg once weekly    Prakash reports testosterone was recently restarted after stopping it for period of time.  He is happy to be resuming therapy.  This is managed at [facility].        GERD: Current medications include: Pepcid (famotidine) 20mg once daily.  Patient feels that current regimen is  effective.    Acne: Current medications:  Doxycycline 100 mg twice daily  Benzoyl peroxide topically daily  RetinA topically daily    No issues reported    ----------------      I spent 49 minutes with this patient toda. A copy of the visit note was provided to the patient's provider(s).    The patient was sent via [employer] a summary of these recommendations.     Luh KentD, BCPP  Medication Therapy Management Pharmacist  [facility]    Telemedicine Visit Details  Type of service:  Telephone visit  Start Time: 9:00 AM  End Time: 9:49 AM  Originating Location (patient location): [place]  Distant Location (provider location):  [facility]     Medication Therapy Recommendations  Schizoaffective disorder, bipolar type (H)    Rationale: Medication requires monitoring - Needs additional monitoring   Recommendation: Order Lab   Status: Contact Provider - Awaiting Response   Note: EKG for clozapine

## 2022-10-17 NOTE — TELEPHONE ENCOUNTER
Writer called patient to notify of 5 day refill to provide a bridge until he is able to  10/23 prescription. Writer also notified patient that he may have to pay out of pocket for prescription if it is not covered by insurance. Patient verbalized understanding. No further questions or concerns at this time.

## 2022-10-18 DIAGNOSIS — Z79.899 MEDICATION MANAGEMENT: Primary | ICD-10-CM

## 2022-10-18 NOTE — TELEPHONE ENCOUNTER
Patient have a video appointment with [facility] today at 1pm. Writer got a hold of patient to check in. Patient informed writer that patient have another appointment today. Patient will call Intake to reschedule when available. Patient's  is informed.

## 2022-10-19 ENCOUNTER — BEH TREATMENT PLAN (OUTPATIENT)
Dept: BEHAVIORAL HEALTH | Facility: CLINIC | Age: 32
End: 2022-10-19

## 2022-10-19 ENCOUNTER — TELEPHONE (OUTPATIENT)
Dept: PSYCHIATRY | Facility: CLINIC | Age: 32
End: 2022-10-19

## 2022-10-19 ENCOUNTER — HOSPITAL ENCOUNTER (OUTPATIENT)
Dept: BEHAVIORAL HEALTH | Facility: CLINIC | Age: 32
Discharge: HOME | End: 2022-10-19
Attending: FAMILY MEDICINE | Admitting: FAMILY MEDICINE
Payer: COMMERCIAL

## 2022-10-19 PROCEDURE — 90791 PSYCH DIAGNOSTIC EVALUATION: CPT | Mod: GT,95 | Performed by: COUNSELOR

## 2022-10-19 ASSESSMENT — COLUMBIA-SUICIDE SEVERITY RATING SCALE - C-SSRS
6. HAVE YOU EVER DONE ANYTHING, STARTED TO DO ANYTHING, OR PREPARED TO DO ANYTHING TO END YOUR LIFE?: YES
2. HAVE YOU ACTUALLY HAD ANY THOUGHTS OF KILLING YOURSELF IN THE PAST MONTH?: YES
4. HAVE YOU HAD THESE THOUGHTS AND HAD SOME INTENTION OF ACTING ON THEM?: YES
1. IN THE PAST MONTH, HAVE YOU WISHED YOU WERE DEAD OR WISHED YOU COULD GO TO SLEEP AND NOT WAKE UP?: YES
3. HAVE YOU BEEN THINKING ABOUT HOW YOU MIGHT KILL YOURSELF?: YES
5. HAVE YOU STARTED TO WORK OUT OR WORKED OUT THE DETAILS OF HOW TO KILL YOURSELF? DO YOU INTEND TO CARRY OUT THIS PLAN?: YES

## 2022-10-19 NOTE — PROGRESS NOTES
Regine arranging orders for EKG. Once labs and EKG done, will plan to start clozapine per titration outlined in note (approved by Regine). MTM will follow-up with patient during clozapine titration. Still waiting to hear from patient on lab plan.

## 2022-10-19 NOTE — TELEPHONE ENCOUNTER
----- Message from BROOKLYN Calvin CNP sent at 10/11/2022  4:54 PM CDT -----  I am not sure if we have VAL for CM, but she contacted as we needed updated DA.  My note is done now, if pt has VAL, would you send updated DA to CM? She has been in appts with pt before, but was not sure if there's official VAL to release record. Thank you.

## 2022-10-19 NOTE — PATIENT INSTRUCTIONS
Welcome to [facility] Adult Mental Health Outpatient Programs    [facility]!    Congratulations! You have completed the first step in your recovery by participating in a Diagnostic Assessment. With your input, Og Tan, [facility], LAD, is recommending the following level of care and services to best meet your needs.    Managing mental health symptoms while balancing life stressors can be difficult. Our mental health programming will provide the group therapy, education, skills, and support needed to improve your well-being while living a healthy and manageable lifestyle.    All our Adult Mental Health Outpatient Programs are group-based and allow you to meet with peers who are trying to manage similar symptoms and/or life circumstances in a safe and therapeutic setting.    Recommendations and Plan:    Level of Care:  Combined Intensive Outpatient Program - Adult Day Treatment  (IOP-ADT) [phone]    Start Date:  October / 24 / 2022    Schedule:  (6B track) Monday, Tuesday, Thursday @ 1 PM to 3:50 PM    If you were placed on a Wait List following your Diagnostic Assessment, please expect the following:  You will receive a phone call from the program within a few days to discuss a start date and plan.    Please contact the program at the number listed above if you are choosing to be removed from the Wait List, need to reschedule your start or if you have any additional questions.    Type of Participation:  Virtual     We are currently providing 100% online group-based programming. It is a requirement that you be physically in the State of [place] when accessing services. Our providers must be licensed in the state you are located in.      To provide the best group experience for everyone, we expect confidentiality, regular attendance, and respectful participation by all.      Cameras need to be on during groups. We want to see you!   Be sure to be in a private place where others will  not overhear or walk in. Using headphones and making sure your screen is not visible to others are steps you can take to ensure confidentiality.   What is said in group, stays in group. (All personal or identifying information shared in group should be kept confidential and not shared with anyone).  NOTE: Audio or Visual recordings are not allowed and may result in immediate discharge from the program.  Zoom may automatically show your first and last name unless you change it when logging into group. We encourage you to change your name to your first or preferred name. You may also include preferred pronouns.   Please be sitting upright in a comfortable position. This will maximize engagement and participation for everyone.   Please refrain from smoking, eating, driving, or engaging in other distracting activities during groups.  Facilitators may provide reminders of the above expectations during group as needed.    If your camera is off and you are not responding to prompts, facilitators will assume you have left and place you in the waiting room. You will need to request to return to group.    Please do NOT cancel your appointments through [employer].  If you are going to be absent, please contact the program number and leave a message so staff will not expect you.   You will NOT be billed for any sessions you do not attend.    See additional attendance and program participation information below.     Accessing Virtual Groups:    The best way to attend groups is through your [employer] account. Please ask staff if you need assistance setting up an account. [employer] HELPLINE: [phone] or [employer] - Login Page [email or web address]).  You will also need to download Zoom Download for Windows - Zoom to your computer (preferred), phone or iPad/Tablet devise. It is NOT necessary to log into or set up a Zoom account.  Log into My Chart each day before group.   Go to the  Visits  tab and select the current date.    You will be  asked to verify personal information on the first day or for longer programs, every 30 days. Please allow extra time on your first day to complete this. You will also be asked to complete assessments regularly so we can monitor your progress and address concerns.    The daily invite through [employer] expires 15 minutes after group starts.   You will need to call the program number to request a link to the group if you:   log out of group once it begins   are late to group   get disconnected   are unable to access group for any reason    There is a new link created every day.    Breaks are provided between groups (10 minutes)   Do not log out.  You may mute or pause your video.   The group facilitator may put you in virtual waiting room until the next group starts.        Combined Intensive Outpatient Day Treatment Program Overview [phone]     This level of care is less intensive than an inpatient or partial hospitalization program and provides more support than traditional individual psychotherapy.      Length of Stay Typically, patients attend up to 12 weeks of programming, although this can vary depending on specific patient needs.   Treatment team Multi-disciplinary team includes a licensed psychotherapist, registered nurse, and occupational therapist. All groups are facilitated by a Licensed Mental Health professional.      Group-based programming 3 groups per day; 3 days per week  50 minutes per group  10-minute break between each group  Facilitated by a member of the treatment team    Group Psychotherapy is provided daily, allowing time to check-in, process concerns, and share feedback/support. All other groups include a topic and provide opportunities for education, skill building, discussion, and support.      Example Group Topics Topics may include:      Behavior Activation, Cognitive Restructuring: Cognitive Distortions, Communication Skills/ Areas for Self-Improvement. Coping Skills, Discharge  Planning, Dimensions of Wellness, Emotions, Forgiveness, Functional Nutrition, Grief, Life Transitions, Leisure Exploration, Life Skills, Medication Assessment, Mental Health Social Support, Mindfulness, Mood Tracking/Triggers, Motivation and Procrastination, Relationship, Relaxation Techniques, Self-Awareness, Self-Confidence, Self-Care, Self-Compassion, Shame and Guilt, Sleep hygiene, SMART Goals, Stages to [place] with Stress, Stigma, Strategies to Improve Motivation, Symptom Awareness, Time Management, Trauma Triggers, Values, Wellness       Psychiatrist and/or Psychologist             Patients will see the program psychiatrist and/or psychologist following admission to the program. Follow-up visits will be every 4 weeks or more frequent, as needed.    If seeing the psychiatrist, they will partner with you and your other providers for medication management, as needed.   Psychiatry services are billed separately.   For insurance purposes, please coordinate with team to prevent scheduling to see the program psychiatrist on the same day you see your outpatient psychiatrist.   If seeing the psychologist, they will provide individual therapy. Medication management will not be provided.     NOTE: Either provider will continue to assess your progress and coordinate with the treatment team to determine when you may be ready for completion.  This is a program requirement.       Individual Therapy   See psychologist services above.    Physical Health Screen Patients meet with a program nurse within the first week to complete a brief physical health screen. This is a program requirement.     FMLA or Short-term Disability We encourage you to request completion of paperwork from your long-term providers.   If this is not an option, please notify the program nurse as soon as possible.  We will do our best to help you coordinate completion of paperwork.   Medical Record requests: please contact Release of Information  at  [phone] and allow up to 14 days for records once the authorization for release is received.    Treatment and Discharge Planning Patients meet individually with team members for treatment planning.   We will help you develop goals and identify strengths and/or barriers.   We will discuss your program participation, progress, and discharge planning.   We are available to assist with referrals and service coordination; please let us know how best we can support your specific needs.   You will receive copies of your treatment plan and discharge summary via [employer].            An Important Note from your Program Treatment Team...    Welcome! We are happy to be partnering with you on your recovery journey. Our experienced clinicians have developed programming based on current research and evidence-based practice to provide you with high quality mental health treatment.     Attendance and Program Participation:  You will participate in a variety of groups each day. Our goal is to provide you with a rich and varied therapeutic healing environment knowing patients have unique experiences and preferred ways of sharing, learning, processing, and engaging in the recovery process.  You are expected to attend all groups on time.  If you are going to be late or absent, please let a team member know the reason. You can also leave that same information at the number listed above.  In the event of an unreported absence, we will reach out to you. If we are unable to reach you, know that we may call your emergency contact.  We always attempt to establish contact with your emergency contact prior to initiating a wellness check.  While it is important that you continue to attend appointments with your individual therapist, psychiatrist, and other medical providers, you are encouraged to schedule these appointments outside of group hours whenever possible.  If your attendance becomes a concern, your treatment team will have a  discussion with you and may start an Attendance Agreement. Following your Attendance Agreement is required to prevent early discharge from the program.  To get the most out of the program and to support your wellbeing we require that you do not use any chemicals, tobacco or vape products on screen or during program hours. The team is available to assist you if this is something you struggle with.  Please be mindful that you are part of a group; therefore, we ask that you be respectful of other group members' needs and do not use derogatory, offensive, or discriminatory language.  You will be removed from group if suspected of being under the influence of substances or if using language that negatively impacts the group.  Your treatment team will address any concerns with you and offer recommendations. A Behavior Agreement may be started. Following your Behavior Agreement is required to prevent early discharge from the program.  Communication with other group members outside of group is discouraged. This can affect your treatment and the way the group functions.  If you choose to share contact information with group peers AFTER you are discharged from the program, this decision is completely independent of any program coordination or support.  While in the program, participants may not engage in any sexual or intimate relationships with each other outside of group. This may result in immediate discharge of both participants from the program.     Trauma:  Many of our patients have experienced trauma. You are not alone. This can be challenging for patients to manage. All our team members have been trained in Trauma Informed Care and will provide you with the education, skills training, and support that you need to stabilize your symptoms.  Specific details and descriptions of abuse, assault, violence, neglect, etc., are best processed in individual therapy as to avoid triggering other group members.  Discussing how  "traumatic experiences have impacted beliefs about self/others/world and practicing skills to cope with symptoms is very appropriate for group therapy.     We look forward to working together to support your mental health.     We love feedback from our patients about our program!  Please take a few moments to respond to surveys sent out by [employer].  This will help us continue to make improvements and to keep the things that are   important to you!  MY COPING PLAN FOR SAFETY        Name: Ayana Prasad  YOB: 1990  Date: October 19, 2022   My primary care provider: Becki Avery  My primary care clinic: [facility]  My prescriber:  psychiatry with Regine Last APRN CNP.  Next appointment: 11/15/2022  Other care team support: psychotherapy with Deborah Rosas with [facility] and   My Triggers:  \"I don't know if I can name them\"     Additional People, Places, and Things that I can access for support: All my providers, my parents, going home         What is important to me and makes life worth living: \"my parents and my dog\".         GREEN    Good Control  1. I feel good  2. No suicidal thoughts   3. Can work, sleep and play      Action Steps  1. Self-care: balanced meals, exercising, sleep practices, etc.  2. Take your medications as prescribed.  3. Continue meetings with therapist and prescriber.  4.  Do the healthy things that I enjoy.             YELLOW  Getting Worse  I have ANY of these:  1. I do not feel good  2. Difficulty Concentrating  3. Sleep is changing  4. Increase/Change in my thoughts to hurt self and/or others, but I can still manage and not act on it.   5. Not taking care of self.               Action Steps (in addition to the above):  1. Inform your therapist and psychiatric prescriber/PCP.  2. Keep taking your medications as prescribed.    3. Turn to people you can ask for help.  4. Use internal coping strategies -see below.  5. Create safe environment: lock and limit " medications, notify friends/family of increase in symptoms and reduce means to other identified method             RED  Get Help  If I have ANY of these:  1. Current and uncontrollable thoughts and/or behaviors to hurt self and/or others.      Actions to manage my safety  1. Contact your emergency person Negra Randall (mother) [phone]  2. Call my crisis team- [facility] [phone] [facility]gen[facility]  3. Or Call 911 or go to the emergency room right away          My Internal Coping Strategies include the following:  take a bath, arts and crafts, color, chew gum, fidget toys, play with my pet and use my coping skills    [Safety Plan Provided via [employer]]     Safety Concerns  How To Identify Situations That Make Your Mental Health Worse:  Triggers are things that make your mental health worse.  Look at the list below to help you find your triggers and what you can do about them.     1. Identify Early Warning Signs:    Sometimes symptoms return, even when people do their best to stay well. Symptoms can develop over a short period of time with little or no warning, but most of the time they emerge gradually over several weeks.  Early warning signs are changes that people experience when a relapse is starting. Some early warning signs are common and others are not as common.   Common Early Warning Signs:    Feeling tense or nervous, Eating less or eating more, Trouble sleeping -either too much or too little sleep, Feeling depressed or low, Feeling irritable, Feeling like not being around other people, Trouble concentrating and Urges to harm self     2. Identify action steps to take when warning signs are noticed:    Taking Action- It is important to take action if you are experiencing early warning signs of a relapse.  The faster you act, the more likely it is that you can avoid a full relapse.  It is helpful to identify several specific ways to cope with symptoms.      The following  is my list of symptoms and coping strategies that I can use when they are present:    Symptom Coping Strategies   Anxiety -Talk with someone in your support system and let him or her know how you are feeling.  -Use relaxation techniques such as deep breathing or imagery.  -Use positive affirmations to counteract negative self-talk such as  I am learning to let go of worry.    Depression - Schedule your day; include activities you have to do and activities you enjoy doing.  - Get some exercise - walk, run, bike, or swim.  - Give yourself credit for even the smallest things you get done.   Sleep Difficulties   - Go to sleep at the same time every day.  - Do something relaxing before bed, such as drinking herbal tea or listening to music.  - Avoid having discussions about upsetting topics before going to bed.   Delusions   - Distract yourself from the disturbing thought by doing something that requires your attention such as a puzzle.  - Check out your beliefs by talking to someone you trust.    Hallucinations   - Use headphones to listen to music.  - Tell voices to  stop  or say to yourself,  I am safe.   - Ignore the hallucinations as much as possible; focus on other things.   Concentration Difficulties - Minimize distractions so there is only one thing for you to focus on at a time.    - Ask the person you are having a conversation with to slow down or repeat things you are unsure of.

## 2022-10-19 NOTE — PROGRESS NOTES
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 activities  - Is often easily distractedby extraneous stimuli  - Is often forgetful in daily activities  - Often fidgets with or taps hands or feet or squirms in seat  B) Several inattentive or hyperactive-impulsive symptoms were present prior to age 12 years B.  Delusions or hallucinations for 2 or more week in the absence of a major mood episode (depressive or manic) during the lifetime duration of the illness., C.  Symptoms that meet criteria for a major mood episode are present for the majority of the total duration of the active and residual portions of the illness.  and D.  The disturbance is not attributable to the effects of a substance or another medical condition. Post- Traumatic Stress Disorder  A. The person has been exposed to a traumatic event in which both of the following were present:     (1) the person experienced, witnessed, or was confronted with an event or events that involved actual or threatened death or serious injury, or a threat to the physical integrity of self or others  B. The traumatic event is persistently reexperienced in one (or more) of the following ways:     - Recurrent and intrusive  distressing recollections of the event, including images, thoughts, or perceptions. Note: In young children, repetitive play may occur in which themes or aspects of the trauma are expressed.      - Recurrent distressing dreams of the event. Note: In children, there may be frightening dreams without recognizable content.      - Intense psychological distress at exposure to internal or external cues that symbolize or resemble an aspect of the traumatic event.   C. Persistent avoidance of stimuli associated with the trauma and numbing of general responsiveness (not present before the trauma), as indicated by three (or more) of the following:     - Efforts to avoid thoughts, feelings, or conversations associated with the trauma.      - Efforts to avoid activities, places, or people that arouse recollections of the trauma.   D. Persistent symptoms of increased arousal (not present before the trauma), as indicated by two (or more) of the following:     - Difficulty concentrating.      - Hypervigilance.      - Exaggerated startle response.   E. Duration of the disturbance is more than 1 month.  F. The disturbance causes clinically significant distress or impairment in social, occupational, or other important areas of functioning.      Functional Status:  Patient reports the following functional impairments:  social interactions.     Programmatic care:  Current WHODAS was assigned and patient needs the following level of care based on score 28  .    Clinical Summary:  1. Reason for assessment: referral from hospital.  2. Psychosocial, Cultural and Contextual Factors: None.  3. Principal DSM5 Diagnoses  (Sustained by DSM5 Criteria Listed Above):   300.02 (F41.1) Generalized Anxiety Disorder  309.81 (F43.10) Posttraumatic Stress Disorder (includes Posttraumatic Stress Disorder for Children 6 Years and Younger)  Without dissociative symptoms.  4. Other Diagnoses that is relevant to services:   Attention-Deficit/Hyperactivity  Disorder  314.01 (F90.9) Unspecified Attention -Deficit / Hyperactivity Disorder  295.70  (F25) Schizoaffective Disorder Bipolar Type.  5. Provisional Diagnosis: none  6. Prognosis: Relieve Acute Symptoms.  7. Likely consequences of symptoms if not treated: If untreated, patient's mental health will likely deteriorate and may require a higher level of care.  8. Client strengths include:  has a previous history of therapy and support of family, friends and providers .     Recommendations:     1. Plan for Safety and Risk Management:   Safety and Risk: A safety and risk management plan has been developed including: Patient consented to co-developed safety plan on 10/19/2022.  Safety and risk management plan was reviewed.   Patient agreed to use safety plan should any safety concerns arise.  A copy was made available to the patient..          Report to child / adult protection services was NA.     2. Patient's identified None.     3. Initial Treatment will focus on:    Depressed Mood   Anxiety  Mood Instability .     4. Resources/Service Plan:    services are not indicated.   Modifications to assist communication are not indicated.   Additional disability accommodations are not indicated.      5. Collaboration:   Collaboration / coordination of treatment will be initiated with the following  support professionals: None.      6.  Referrals:   The following referral(s) will be initiated: Day Treatment. Next Scheduled Appointment: 10/24/2022     A Release of Information has been obtained for the following: None.     Emergency Contact Negra Randall (mother) [phone] was obtained.     7. KIERRA:    KIERRA:  Discussed the general effects of drugs and alcohol on health and well-being. Recommendations:  Pt agreed to abstain from use while participating in programming.     8. Records:   These were reviewed at time of assessment.   Information in this assessment was obtained from the medical record and  provided by  patient who is a good historian.    Patient will have open access to their mental health medical record.        Provider Name/ Credentials:  Og Tan, [facility]    October 19, 2022              Clinical Substantiation  Summary: Patient is a 32 year old transgender male with a mental health history ADHD, PTSD, anxiety and schizoaffective disorder. Patient did not provide any other mental health diagnosis, but medical records state: .     ADHD (attention deficit hyperactivity disorder)       Bipolar 1 disorder       Borderline personality disorder       Cauda equina syndrome       Chronic low back pain       Depression       GERD (gastroesophageal reflux disease)       h/o TBI (traumatic brain injury)       Hypertension, unspecified type 12/16/2021     Marginal corneal ulcer, left 07/17/2015     Nephrolithiasis       obesity       Polysubstance abuse - methamphetamine, hallucinagen, heroin, marijuana       currently in remission     PONV (postoperative nausea and vomiting)       PTSD (post-traumatic stress disorder)      Patient has a  history of psychiatric hospitalizations. Patient has a history of SI and SA, but denies SIB. Denies any concerns with current alcohol use.  Patient would benefit from additional support and psycho education to manage current mental health symptoms.  The patient's acute suicide risk was determined to be low due to the following factors: Denial of suicidal thoughts, no history of suicide attempts. Patient is not currently under the influence of alcohol or illicit substances, denies experiencing command hallucinations, and has no immediate access to firearms. The patient's acute risk could be higher if noncompliant with their follow-up appointments or using illicit substances or alcohol. Protective factors include: dedication to family or friends        Placement/Program Barriers Identified: None    Referral: Day Treatment Psychosis group               Outpatient Mental Health  "Services - Adult    MY COPING PLAN FOR SAFETY        Name: Ayana Prasad  YOB: 1990  Date: October 19, 2022   My primary care provider: Becki Avery  My primary care clinic[facility]  My prescriber:  psychiatry with Regine Last APRN CNP.  Next appointment: 11/15/2022  Other care team support: psychotherapy with Deborah Rosas with [facility] and   My Triggers:  \"I don't know if I can name them\"     Additional People, Places, and Things that I can access for support: All my providers, my parents, going home         What is important to me and makes life worth living: \"my parents and my dog\".         GREEN    Good Control  1. I feel good  2. No suicidal thoughts   3. Can work, sleep and play      Action Steps  1. Self-care: balanced meals, exercising, sleep practices, etc.  2. Take your medications as prescribed.  3. Continue meetings with therapist and prescriber.  4.  Do the healthy things that I enjoy.             YELLOW  Getting Worse  I have ANY of these:  1. I do not feel good  2. Difficulty Concentrating  3. Sleep is changing  4. Increase/Change in my thoughts to hurt self and/or others, but I can still manage and not act on it.   5. Not taking care of self.               Action Steps (in addition to the above):  1. Inform your therapist and psychiatric prescriber/PCP.  2. Keep taking your medications as prescribed.    3. Turn to people you can ask for help.  4. Use internal coping strategies -see below.  5. Create safe environment: lock and limit medications, notify friends/family of increase in symptoms and reduce means to other identified method             RED  Get Help  If I have ANY of these:  1. Current and uncontrollable thoughts and/or behaviors to hurt self and/or others.      Actions to manage my safety  1. Contact your emergency person Negra Randall (mother) [phone]  2. Call my crisis team- [facility] [phone] Community Outreach for Psych Emergencies  3. Or " Call 911 or go to the emergency room right away          My Internal Coping Strategies include the following:  take a bath, arts and crafts, color, chew gum, fidget toys, play with my pet and use my coping skills    [Safety Plan Provided via [employer]]     Safety Concerns  How To Identify Situations That Make Your Mental Health Worse:  Triggers are things that make your mental health worse.  Look at the list below to help you find your triggers and what you can do about them.     1. Identify Early Warning Signs:    Sometimes symptoms return, even when people do their best to stay well. Symptoms can develop over a short period of time with little or no warning, but most of the time they emerge gradually over several weeks.  Early warning signs are changes that people experience when a relapse is starting. Some early warning signs are common and others are not as common.   Common Early Warning Signs:    Feeling tense or nervous, Eating less or eating more, Trouble sleeping -either too much or too little sleep, Feeling depressed or low, Feeling irritable, Feeling like not being around other people, Trouble concentrating and Urges to harm self     2. Identify action steps to take when warning signs are noticed:    Taking Action- It is important to take action if you are experiencing early warning signs of a relapse.  The faster you act, the more likely it is that you can avoid a full relapse.  It is helpful to identify several specific ways to cope with symptoms.      The following is my list of symptoms and coping strategies that I can use when they are present:    Symptom Coping Strategies   Anxiety -Talk with someone in your support system and let him or her know how you are feeling.  -Use relaxation techniques such as deep breathing or imagery.  -Use positive affirmations to counteract negative self-talk such as  I am learning to let go of worry.    Depression - Schedule your day; include activities you have to do and  activities you enjoy doing.  - Get some exercise - walk, run, bike, or swim.  - Give yourself credit for even the smallest things you get done.   Sleep Difficulties   - Go to sleep at the same time every day.  - Do something relaxing before bed, such as drinking herbal tea or listening to music.  - Avoid having discussions about upsetting topics before going to bed.   Delusions   - Distract yourself from the disturbing thought by doing something that requires your attention such as a puzzle.  - Check out your beliefs by talking to someone you trust.    Hallucinations   - Use headphones to listen to music.  - Tell voices to  stop  or say to yourself,  I am safe.   - Ignore the hallucinations as much as possible; focus on other things.   Concentration Difficulties - Minimize distractions so there is only one thing for you to focus on at a time.    - Ask the person you are having a conversation with to slow down or repeat things you are unsure of.

## 2022-10-20 ENCOUNTER — HOSPITAL ENCOUNTER (OUTPATIENT)
Dept: MAMMOGRAPHY | Facility: CLINIC | Age: 32
Discharge: HOME | End: 2022-10-20
Attending: NURSE PRACTITIONER | Admitting: NURSE PRACTITIONER
Payer: COMMERCIAL

## 2022-10-20 ENCOUNTER — TELEPHONE (OUTPATIENT)
Dept: PSYCHIATRY | Facility: CLINIC | Age: 32
End: 2022-10-20

## 2022-10-20 DIAGNOSIS — F90.2 ATTENTION DEFICIT HYPERACTIVITY DISORDER (ADHD), COMBINED TYPE: Primary | ICD-10-CM

## 2022-10-20 DIAGNOSIS — Z12.31 ENCOUNTER FOR SCREENING MAMMOGRAM FOR BREAST CANCER: ICD-10-CM

## 2022-10-20 PROCEDURE — 77067 SCR MAMMO BI INCL CAD: CPT

## 2022-10-20 RX ORDER — DEXTROAMPHETAMINE SACCHARATE, AMPHETAMINE ASPARTATE MONOHYDRATE, DEXTROAMPHETAMINE SULFATE AND AMPHETAMINE SULFATE 1.25; 1.25; 1.25; 1.25 MG/1; MG/1; MG/1; MG/1
5 CAPSULE, EXTENDED RELEASE ORAL DAILY
Qty: 30 CAPSULE | Refills: 0 | Status: SHIPPED | OUTPATIENT
Start: 2022-10-20 | End: 2022-11-15

## 2022-10-20 NOTE — TELEPHONE ENCOUNTER
Patient called and is unable to get anxiety under control and now having passive suicidal thoughts. No plan and no intent.  Patient has been using coping skills and medications and still unable to get anxiety under control. Discussed options with patient and he feels like he has tried everything he can. He is somewhat hesitant to go to the ED as he was recently seen there. Writer will reach out to mattie to see what they think. Declines [facility]. He prefers [facility]. Writer offered to have patient hold while writer reached out to provider but states that he is at group home and safe and just to call back. Message sent to provider.

## 2022-10-20 NOTE — TELEPHONE ENCOUNTER
"Regine Last, [place] CNP  You 7 minutes ago (4:21 PM)     YMARY  He just sent me a message earlier to see if he can take higher Propranolol dose and I ok'd to change to 10 mg BD. I am not sure if there's other things going on because he has been having anxiety exacerbation last couple weeks. I know we have been increasing Adderall and also he requested early refill. I wonder if he is using more than he should, I am not sure. I may say first recommendation would be to decrease Adderall to 5 mg daily as he was not reporting anxiety when he was on this dose.     He can also go to ER, looks like he is starting IOP on 10/24.  I would say no to benzo.      Follow up:   Patient does agree that he was not having as much anxiety on the lower dose of Adderall. He agrees to go back to 5 mg but does need some sent in. He does feel like he will be able to stay safe tonight and plans to play with dog, Nugget.He does agree to go to the ED if needed.  Discussed IOP next week and patient states, \"I don't know if I an going to do it\". \"It is making me nervous\". Discussed at least trying it out and he said he will go on Monday. Writer will follow up and see how it went.   "

## 2022-10-21 NOTE — TELEPHONE ENCOUNTER
Attempted to call patient.  No answer. Left voicemail that medication was sent in and to call back clinic at [phone] if any questions.

## 2022-10-24 ENCOUNTER — TELEPHONE (OUTPATIENT)
Dept: BEHAVIORAL HEALTH | Facility: CLINIC | Age: 32
End: 2022-10-24

## 2022-10-24 NOTE — TELEPHONE ENCOUNTER
Phone call to coordinate start.  A message was left    John Ling, ROSALINA,  Licensed Clinical Psychologist

## 2022-10-24 NOTE — PROGRESS NOTES
"Outpatient Mental Health Services - Adult     MY COPING PLAN FOR SAFETY           Name: Ayana Prasad  YOB: 1990  Date: October 19, 2022   My primary care provider: Becki Avery  My primary care clinic: [facility]  My prescriber:  psychiatry with Regine Last APRN CNP.  Next appointment: 11/15/2022  Other care team support: psychotherapy with Deborah Rosas with [facility]    My Triggers:  \"I don't know if I can name them\"       Additional People, Places, and Things that I can access for support: All my providers, my parents, going home            What is important to me and makes life worth living: \"my parents and my dog\".            GREEN     Good Control  1. I feel good  2. No suicidal thoughts   3. Can work, sleep and play        Action Steps  1. Self-care: balanced meals, exercising, sleep practices, etc.  2. Take your medications as prescribed.  3. Continue meetings with therapist and prescriber.  4.  Do the healthy things that I enjoy.                YELLOW  Getting Worse  I have ANY of these:  1. I do not feel good  2. Difficulty Concentrating  3. Sleep is changing  4. Increase/Change in my thoughts to hurt self and/or others, but I can still manage and not act on it.   5. Not taking care of self.                Action Steps (in addition to the above):  1. Inform your therapist and psychiatric prescriber/PCP.  2. Keep taking your medications as prescribed.    3. Turn to people you can ask for help.  4. Use internal coping strategies -see below.  5. Create safe environment: lock and limit medications, notify friends/family of increase in symptoms and reduce means to other identified method                RED  Get Help  If I have ANY of these:  1. Current and uncontrollable thoughts and/or behaviors to hurt self and/or others.        Actions to manage my safety  1. Contact your emergency person Negra Randall (mother) [phone]  2. Call my crisis team- [facility] [phone] " Community Outreach for Psych Emergencies  3. Or Call 911 or go to the emergency room right away            My Internal Coping Strategies include the following:  take a bath, arts and crafts, color, chew gum, fidget toys, play with my pet and use my coping skills     [Safety Plan Provided via [employer]]      Safety Concerns  How To Identify Situations That Make Your Mental Health Worse:  Triggers are things that make your mental health worse.  Look at the list below to help you find your triggers and what you can do about them.      1. Identify Early Warning Signs:     Sometimes symptoms return, even when people do their best to stay well. Symptoms can develop over a short period of time with little or no warning, but most of the time they emerge gradually over several weeks.  Early warning signs are changes that people experience when a relapse is starting. Some early warning signs are common and others are not as common.   Common Early Warning Signs:    Feeling tense or nervous, Eating less or eating more, Trouble sleeping -either too much or too little sleep, Feeling depressed or low, Feeling irritable, Feeling like not being around other people, Trouble concentrating and Urges to harm self                 2. Identify action steps to take when warning signs are noticed:     Taking Action- It is important to take action if you are experiencing early warning signs of a relapse.  The faster you act, the more likely it is that you can avoid a full relapse.  It is helpful to identify several specific ways to cope with symptoms.       The following is my list of symptoms and coping strategies that I can use when they are present:     Symptom Coping Strategies   Anxiety -Talk with someone in your support system and let him or her know how you are feeling.  -Use relaxation techniques such as deep breathing or imagery.  -Use positive affirmations to counteract negative self-talk such as  I am learning to let go of worry.     Depression - Schedule your day; include activities you have to do and activities you enjoy doing.  - Get some exercise - walk, run, bike, or swim.  - Give yourself credit for even the smallest things you get done.   Sleep Difficulties    - Go to sleep at the same time every day.  - Do something relaxing before bed, such as drinking herbal tea or listening to music.  - Avoid having discussions about upsetting topics before going to bed.   Delusions    - Distract yourself from the disturbing thought by doing something that requires your attention such as a puzzle.  - Check out your beliefs by talking to someone you trust.    Hallucinations    - Use headphones to listen to music.  - Tell voices to  stop  or say to yourself,  I am safe.   - Ignore the hallucinations as much as possible; focus on other things.   Concentration Difficulties - Minimize distractions so there is only one thing for you to focus on at a time.    - Ask the person you are having a conversation with to slow down or repeat things you are unsure of.

## 2022-10-24 NOTE — TELEPHONE ENCOUNTER
Writer called to conduct Admission. Left VM requesting call back. Will reattempt.  Kristin Gongora RN on 10/24/2022 at 12:08 PM

## 2022-10-25 ENCOUNTER — TELEPHONE (OUTPATIENT)
Dept: BEHAVIORAL HEALTH | Facility: CLINIC | Age: 32
End: 2022-10-25

## 2022-10-25 NOTE — TELEPHONE ENCOUNTER
----- Message from [place] MORALES [place] sent at 10/25/2022  8:08 AM CDT -----  Regarding: Appt Request  Hi  Team,     Please make more appts.     Location of programming: [facility]/Telemedicine   Group: Adult Day Treatment 6B: M, Tu, Th @ 1pm   Provider: Oswaldo Seaamn   Number of visits to be scheduled: as many as authorized   Length/Duration of Appointment in minutes: 180 minutes   Visit Type (VIDEO/TELEPHONE/IN-PERSON): [facility] GROUP THERAPY VIDEO VISIT [5125]   Additional notes: Patient was scheduled to start yesterday, didn't start.     Thank you!

## 2022-10-25 NOTE — TELEPHONE ENCOUNTER
LVM requesting call back and to indicate interest in starting.  Kristin Gongora RN on 10/25/2022 at 10:26 AM

## 2022-10-25 NOTE — TELEPHONE ENCOUNTER
Phone call to coordinate care and a message was left for starting group.    John Ling, D,  Licensed Clinical Psychologist

## 2022-10-26 ENCOUNTER — VIRTUAL VISIT (OUTPATIENT)
Dept: PSYCHOLOGY | Facility: CLINIC | Age: 32
End: 2022-10-26
Payer: COMMERCIAL

## 2022-10-26 ENCOUNTER — MYC REFILL (OUTPATIENT)
Dept: PALLIATIVE MEDICINE | Facility: CLINIC | Age: 32
End: 2022-10-26

## 2022-10-26 ENCOUNTER — TELEPHONE (OUTPATIENT)
Dept: PSYCHOLOGY | Facility: CLINIC | Age: 32
End: 2022-10-26

## 2022-10-26 DIAGNOSIS — M51.369 DDD (DEGENERATIVE DISC DISEASE), LUMBAR: ICD-10-CM

## 2022-10-26 DIAGNOSIS — F64.0 GENDER DYSPHORIA IN ADULT: Primary | ICD-10-CM

## 2022-10-26 PROCEDURE — 90832 PSYTX W PT 30 MINUTES: CPT | Mod: 95 | Performed by: MARRIAGE & FAMILY THERAPIST

## 2022-10-26 NOTE — PROGRESS NOTES
[place] for [facility] - Progress Note    Date of Service: 10/26/22   Name: Prakash Prasad (Katie)  : 1990  Medical Record Number: [id number]  Treating Provider: Aleshia Henao PsyD  Type of Session: Individual  Present in Session: Client  Session Start and Stop Time: 1:00-1:20  Number of Minutes:  20    SERVICE MODALITY:  Video Visit:      Provider verified identity through the following two step process.  Patient provided:  Patient was verified at admission/transfer    Telemedicine Visit: The patient's condition can be safely assessed and treated via synchronous audio and visual telemedicine encounter.      Reason for Telemedicine Visit: Patient convenience (e.g. access to timely appointments / distance to available provider)    Originating Site (Patient Location): Patient's home    Distant Site (Provider Location): [facility]    Consent:  The patient/guardian has verbally consented to: the potential risks and benefits of telemedicine (video visit) versus in person care; bill my insurance or make self-payment for services provided; and responsibility for payment of non-covered services.     Patient would like the video invitation sent by:  My Chart    Mode of Communication:  Video Conference via [employer]    Distant Location (Provider):  On-site    As the provider I attest to compliance with applicable laws and regulations related to telemedicine.    DSM-5 Diagnoses:  Encounter Diagnosis   Name Primary?     Gender dysphoria in adult Yes       Current Reported Symptoms and Status update:  Client endorses persistent gender dysphoria related to birth assigned sex.    Progress Toward Treatment Goals:   Satisfactory progress   Stable/Maintenance of progress     Therapeutic Interventions/Treatment Strategies:    Area(s) of treatment focus addressed in this session included Gender Health      Psychotherapist offered support, feedback and validation Treatment  modalities used include Gender Affirming Care Interventions include Other: updated letter of support.  Structured Activity    Patient Response:   Patient responded to session by focusing on goals and accepting support  Possible barriers to participation / learning include: N/A    Current Mental Status Exam:   Appearance:  Appropriate   Eye Contact:  Poor  Attitude / Demeanor: Asher  Speech      Rate / Production: Normal/ Responsive      Volume:  Normal  volume  Orientation:  All  Mood:   Apathetic  Affect:   Blunted   Thought Content: Clear   Insight:   Fair       Plan/Need for Future Services:    Patient has a current master individualized treatment plan.  See Epic treatment plan for more information.    Referral / Collaboration:  Referral to another professional/service is not indicated at this time..    Emergency Intervention needed?  No    Assignment:  None    Interactive Complexity:  There are four specific communication difficulties that complicate the work of the primary psychiatric procedure.  Interactive complexity (+[id number]) may be reported when at least one of these difficulties is present.    Communication difficulties present during current the psychiatric procedure include:  1. None.      Signature/Title:      Aleshia Henao PsyD

## 2022-10-26 NOTE — LETTER
October 26, 2022     [facility]  Department of Plastic Surgery   [address] [address]    Re: Prakash Prasad  YOB: 1990  MRN:  [id number]    Primary letter of support for gender affirmation surgery (GAS); top surgery     Dear [facility],    I am writing this letter in support of Prakash Prasad s intention to pursue GAS, specifically top surgery. Prakash is a 32 year-old transmasculine person who has been receiving care through the [facility] ([facility] since Feb 2021. Prakash sought services at [facility] to receive his letter of support for GAS and to make decisions about his transition and care. I have been employed as an  and Licensed Marital and Family Therapist with the [facility] since September 2016, and have been working with transgender and gender diverse clients in the Transgender Health Services Program since that time.     Prakash completed a diagnostic assessment with Dr. Dora Mazariegos LP on February 26, 2021 and continued to meet with her bi-weekly to address concerns related to gender dysphoria until she left in August 2021. Through the diagnostic assessment process, Prakash was diagnosed with 302.85 - Gender Dysphoria. This author met with Prakash to continue the process of obtaining a letter of support on October 13, 2021 and October 26,2022. Prakash was diagnosed with Gender Dysphoria based on indication of social and anatomical gender dysphoria related to his birth assigned sex dating back to childhood. Prakash began his social gender transition within the past year. He began masculinizing hormone therapy in March through December 2021 through [facility]. Prakash plans to resume hormones in the near future. He changed his legal name to his chosen name October 2021. He plans to  continue individual therapy to support his gender transition.     Prakash reported the following mental health diagnoses: F25.0/295.70 - Schizoaffective Disorder, Bipolar Type; F43.10/309.81- Posttraumatic Stress Disorder; F41.1/300.02 - Generalized Anxiety Disorder; F90.9/314.01  - Unspecified Attention Deficit/Hyperactivity Disorder. 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 Prakash receives mental health therapy from Deborah Snaford at [facility]. He see's BROOKLYN Cavlin, through [facility]. Current medications include: Propranolol and Gabapentin; Lithium 1200mg; Adderall 10mg; Zyprexa as needed. Prakash has additional support through [facility] and in-home nursing care. Additionally, Prakash reports that he voluntarily extends a civil mental health commitment in service of ongoing mental health stability.      His last psychiatric hospitalization was in December 2021 due to haven. He reports no ongoing suicidal ideation, and stability of mental health symptoms through his medication regimen and the ongoing support of his care team. At this time, Prakash reported that his mental health is stable. He continues to experience symptoms including auditory hallucinations, but reports good coping skills and no interference with his daily life. He has demonstrated good compliance with treatment recommendations, including adherence to a complex medication regimen, with the support of his care team.       Prakash greatly desires GAS to decrease his gender and anatomical dysphoria. Prakash has given his informed consent to work towards GAS, and has support and knowledge regarding the aftercare process. Prakash has consulted with other individuals who have gone through this surgery to develop an aftercare plan that takes into account the recommendations for post-surgery recovery. Prakash has support from friends and his care team  during his recovery. It is recommended that Prakash utilize supports through his group home environment and care team during recovery.     Prakash currently meets the World Professional Association for Transgender Health (WPATH) Standards of Care for surgical gender reassignment of gender dysphoric persons, as well as internal prerequisites for GAS:    WPATH Criteria for Top Surgery (One Referral)    1. Persistent well-documented gender dysphoria    2. Capacity to make a fully informed decision and to consent to treatment    3. Age of majority in a given country; if minor, parental consent    4. If significant medical or mental health concerns are present, they must be reasonably well controlled.    Surgery is considered a medically necessary treatment by the WPATH Standards of Care for transgender persons. On behalf of our Transgender Services team, I support Prakash s decision to pursue medically necessary surgery at this time.     In some cases, denial of access to this surgery can lead to increased isolation, decreased work performance, and decreased sexual and interpersonal functioning. Surgery is expected to decrease dysphoria, increase comfort with self, and improve interpersonal, sexual, and vocational functioning. Prakash has educated himself about the surgery and is aware of its risks and benefits.    The team s support is based on the psychological indication for surgery and did not include a physical examination to assess medical contra-indications for the surgical procedure. This letter of support is valid for one year from the date of approval.      Please note that I am available for ongoing consultation and coordination of Prakash s ongoing care needs. If you have any questions, or are in need of additional information, please do not hesitate to contact me.      Sincerely,           Pronouns: she/her/hers    [facility]  Department  of Family Medicine & Community Health  [facility]  [phone]

## 2022-10-26 NOTE — TELEPHONE ENCOUNTER
Refills have been requested for the following medications:         gabapentin (NEURONTIN) 300 MG capsule [Luz N]     Preferred pharmacy: [facility] [id number] - [place], [place] - [address] [place] [address] ROAD[address]

## 2022-10-27 RX ORDER — GABAPENTIN 300 MG/1
900 CAPSULE ORAL 3 TIMES DAILY
Qty: 270 CAPSULE | Refills: 1 | Status: SHIPPED | OUTPATIENT
Start: 2022-10-27 | End: 2022-12-16

## 2022-10-27 NOTE — TELEPHONE ENCOUNTER
Signed Prescriptions:                        Disp   Refills    gabapentin (NEURONTIN) 300 MG capsule      270 ca*1        Sig: Take 3 capsules (900 mg) by mouth 3 times daily  Authorizing Provider: TODD BUSTOS, RN CNP, FNP  [facility]

## 2022-10-27 NOTE — TELEPHONE ENCOUNTER
Outreach X1. Left a  requesting call back. Provided call back number.    ELI AnneN, RN  Care Coordinator  [facility] [place]

## 2022-10-27 NOTE — TELEPHONE ENCOUNTER
Received fax request from [id number] - [place], [place] pharmacy requesting refill(s) for gabapentin (NEURONTIN) 300 MG capsule     Last refilled on 10/01/2022    Pt last seen on 07/22/2022  Next appt scheduled for None    Will facilitate refill.    Kat Multani MA  [facility] [place]

## 2022-10-28 ENCOUNTER — DOCUMENTATION ONLY (OUTPATIENT)
Dept: PLASTIC SURGERY | Facility: CLINIC | Age: 32
End: 2022-10-28

## 2022-10-28 ENCOUNTER — TELEPHONE (OUTPATIENT)
Dept: BEHAVIORAL HEALTH | Facility: CLINIC | Age: 32
End: 2022-10-28

## 2022-10-28 NOTE — PROGRESS NOTES
Received updated LOS. Moved pt to 'ready to schedule' Dr. Mayers surgery list per team protocol.

## 2022-10-28 NOTE — TELEPHONE ENCOUNTER
----- Message from Kristin Gongora RN sent at 10/28/2022 12:08 PM CDT -----  Regarding: Please remove from 6B CATERINA  Hi there,  Randall didn't end up starting, and we were not able to connect with him to start at this time. Please remove from CATERINA.    Thank you!  Kristin Gongora RN

## 2022-10-31 ENCOUNTER — TELEPHONE (OUTPATIENT)
Dept: BEHAVIORAL HEALTH | Facility: CLINIC | Age: 32
End: 2022-10-31

## 2022-10-31 ENCOUNTER — BEH TREATMENT PLAN (OUTPATIENT)
Dept: BEHAVIORAL HEALTH | Facility: CLINIC | Age: 32
End: 2022-10-31
Attending: PSYCHIATRY & NEUROLOGY

## 2022-10-31 NOTE — TELEPHONE ENCOUNTER
Phone call to coordinate start.  A message was left on voice mail  About coordination of care.    John Ling, D,  Licensed Clinical Psychologist

## 2022-10-31 NOTE — PROGRESS NOTES
"    Admission SBAR NOTE  Adult  Outpatient Programs          SITUATION:     Admission Date: 10/31/2022    Provider verified identity through the following two step process.  Patient provided: verbal spelling of full first and last name and Patient  address    Patient name:  Ayana Prasad  Preferred name: Prakash He/Him/His/Himself 32 year old  Diagnosis/-es (copy from , including ICD-10):   300.02 (F41.1) Generalized Anxiety Disorder  309.81 (F43.10) Posttraumatic Stress Disorder (includes Posttraumatic Stress Disorder for Children 6 Years and Younger)  Without dissociative symptoms.  4. Other Diagnoses that is relevant to services:   Attention-Deficit/Hyperactivity Disorder  314.01 (F90.9) Unspecified Attention -Deficit / Hyperactivity Disorder  295.70  (F25) Schizoaffective Disorder Bipolar Type.      Assigned Program/Track: 6B    Reviewed patient's schedule and informed them of any variation due to holidays. yes    Does the patient have any planned absences and/or barriers to admission/treatment? yes internet is down, currently    NOTE: impact of transportation, technology, childcare, work, or housing concerns.    Insurance: Payor: [facility] / Plan: [facility] [facility] / Product Type: HMO /  Changes/Concerns: no    Medicare    Does patient need an appointment with the program provider? no  NOTE: If yes, please confirm/schedule provider visit.      BACKGROUND:     Patient's stated goal/reason for treatment (copy from [employer]; confirm with patient): \" He reported an increased problem with voices and anxiety. He stated that the voices are giving command-type hallucinations, and do stuff in regards to things he is doing and it interferes with his concentration.  He stated that it doesn't cause panic attacks.\"      ASSESSMENT:     Please consult  if any of the following concerns may impact admission/participation in program:     PHQ, AVA and PROMIS done within 7 days OR send upon admission if over 7 days.  "     [place] Suicide Severity Rating (select Lifetime/Recent):   [place] Suicide Severity Rating Scale (Short Version)  [place] Suicide Severity Rating (Short Version) 11/26/2021 11/27/2021 5/5/2022 6/10/2022 10/10/2022 10/19/2022 10/31/2022   Over the past 2 weeks have you felt down, depressed, or hopeless? no no no yes no - -   Comments - Available records show that patient was suicidal - - - - -   Over the past 2 weeks have you had thoughts of killing yourself? no no no yes no - -   Comments - Patient denied despite record confirming suicidal thoughts - - - - -   Have you ever attempted to kill yourself? no no no yes no - -   Comments - Record says patient had attempted suicide x 3 in the past. - - - - -   When did this last happen? - - - within the last 24 hours (including today) - - -   Q1 Wished to be Dead (Past Month) no - - yes - yes -   Q2 Suicidal Thoughts (Past Month) no - - yes - yes -   Q3 Suicidal Thought Method no - - yes - yes -   Comments - - - - - - -   Q4 Suicidal Intent without Specific Plan no - - yes - yes -   Q5 Suicide Intent with Specific Plan no - - yes - yes -   Q6 Suicide Behavior (Lifetime) no - - yes - yes -   Within the Past 3 Months? - - - - - no -   Level of Risk per Screen - - - high risk - high risk -   High Risk Required Interventions - - - Provider notified;On continuous in person observation - - -   Required Interventions - - - Belongings removed;Patient searched - - -   Interventions - - - DEC consulted;Monitored via video - - -   1. Wish to be Dead (Since Last Contact) - - - - - - 1   Wish to be Dead Description (Since Last Contact) - - - - - - 0   Most Severe Ideation Rating (Since Last Contact) - - - - - - 2   Frequency (Since Last Contact) - - - - - - 3   Duration (Since Last Contact) - - - - - - 1   Deterrents (Since Last Contact) - - - - - - 1   Reasons for Ideation (Since Last Contact) - - - - - - 0   Actual Attempt (Since Last Contact) - - - - - - 0   Total Number of  Actual Attempts (Since Last Contact) - - - - - - 0   Actual Attempt Description (Since Last Contact) - - - - - - 0   Has subject engaged in non-suicidal self-injurious behavior? (Since Last Contact) - - - - - - 0   Interrupted Attempts (Since Last Contact) - - - - - - 0   Aborted or Self-Interrupted Attempt (Since Last Contact) - - - - - - 0   Preparatory Acts or Behavior (Since Last Contact) - - - - - - 0   Suicide (Since Last Contact) - - - - - - 0   Most Lethal Attempt Date - - - - - - (No Data)   Actual Lethality/Medical Damage Code (Most Lethal Attempt) - - - - - - 1   Calculated C-SSRS Risk Score (Since Last Contact) - - - - - - Low Risk       WHODAS completed for recommended level of care? yes     Copy/Paste current Safety Plan to the BEH TX PLAN ENCOUNTER. Yes  attached    Safety status/concerns: He reported being safe.     Substance use concerns: no  NOTE: if no substance use concerns, OK to delete below:     Patient reported last use of substances as: sober, for months.   He is prescribed medical cannabis    Patient reports/presents withdrawal/intoxication concerns: no    Pertinent Medical/Nutritional concerns: no    Review Tele-Health Requirements (including secure environment, confidentiality, in-state status, equipment needs and process - encourage MyChart): is ok    Confirm Emergency Contact listed in the SnapShot/Demographics with patient and notify OBC if an update is required. yes    Paper or Docusign requirements for ROIs, e-VAL, emergency contact, etc have been completed? yes  If not, do upon admission.     Does patient have FMLA or Short-Term Disability requests/plans? no  NOTE: Whenever possible, FMLA or Short-Term Disability paperwork needs to be managed/completed by the patient's community provider.   Exceptions: Patient does not have a community provider AND request is specific to mental health and time off for the duration of the program participation.    Notify RN Triage as soon as  possible.     Care Providers/Medication Management Needs: Andrea Phoenix, : [phone] [facility]    Does patient have a current community or other [facility] provider prescribing medications for mental health? yes  NOTE: Delete below if not applicable:  Dr. Mc, [facility]     psychiatry with Regine Last APRN CNP.  Next appointment: 11/15/2022  Other care team support: psychotherapy with Deborah Rosas with [facility] and    Psychiatric Provider (or PCP if managing MH meds)/Name:  Berhane Last CNP  Clinic name/location:  psychiatry with Regine Last APRN CNP.  Next appointment: 11/15/2022  Other care team support:   Last appointment:    Next appointment:  11/15     NOTE: Inform patients, program is temporary and we will not be transferring care. Patient's should continue to see their community provider.       Individual Therapist/Name:  psychotherapy with Deborah Rosas with [facility] and  Clinic name[facility]location: [facility]  Last appointment:    Next appointment:  Will schedule     Patient will continue to see above provider while participating in program: no        RECOMMENDATIONS:     Patient Admission Completed: yes    Care Team, referrals made/needed: yes  PCP: Becki Avery  NOTE: Notify RN, as needed, to make internal referrals.                                                             Completed by: Amanda Meléndez Psy., D,  Licensed Clinical Psychologist

## 2022-10-31 NOTE — PROGRESS NOTES
"   Outpatient Mental Health Services - Adult     MY COPING PLAN FOR SAFETY           Name: Ayana Prasad  YOB: 1990  Date: October 19, 2022   My primary care provider: Becki Avery  My primary care clinic: [facility]  My prescriber:  psychiatry with Regine Last APRN CNP.  Next appointment: 11/15/2022  Other care team support: psychotherapy with Deborah Rosas with [facility] and    My Triggers:  \"I don't know if I can name them\"       Additional People, Places, and Things that I can access for support: All my providers, my parents, going home            What is important to me and makes life worth living: \"my parents and my dog\".            GREEN     Good Control  1. I feel good  2. No suicidal thoughts   3. Can work, sleep and play        Action Steps  1. Self-care: balanced meals, exercising, sleep practices, etc.  2. Take your medications as prescribed.  3. Continue meetings with therapist and prescriber.  4.  Do the healthy things that I enjoy.                YELLOW  Getting Worse  I have ANY of these:  1. I do not feel good  2. Difficulty Concentrating  3. Sleep is changing  4. Increase/Change in my thoughts to hurt self and/or others, but I can still manage and not act on it.   5. Not taking care of self.                Action Steps (in addition to the above):  1. Inform your therapist and psychiatric prescriber/PCP.  2. Keep taking your medications as prescribed.    3. Turn to people you can ask for help.  4. Use internal coping strategies -see below.  5. Create safe environment: lock and limit medications, notify friends/family of increase in symptoms and reduce means to other identified method                RED  Get Help  If I have ANY of these:  1. Current and uncontrollable thoughts and/or behaviors to hurt self and/or others.        Actions to manage my safety  1. Contact your emergency person Negra Randall (mother) [phone]  2. Call my crisis team- [facility] " [phone] [facility] for Psych Emergencies  3. Or Call 911 or go to the emergency room right away            My Internal Coping Strategies include the following:  take a bath, arts and crafts, color, chew gum, fidget toys, play with my pet and use my coping skills     [Safety Plan Provided via [employer]]      Safety Concerns  How To Identify Situations That Make Your Mental Health Worse:  Triggers are things that make your mental health worse.  Look at the list below to help you find your triggers and what you can do about them.      1. Identify Early Warning Signs:     Sometimes symptoms return, even when people do their best to stay well. Symptoms can develop over a short period of time with little or no warning, but most of the time they emerge gradually over several weeks.  Early warning signs are changes that people experience when a relapse is starting. Some early warning signs are common and others are not as common.   Common Early Warning Signs:    Feeling tense or nervous, Eating less or eating more, Trouble sleeping -either too much or too little sleep, Feeling depressed or low, Feeling irritable, Feeling like not being around other people, Trouble concentrating and Urges to harm self                 2. Identify action steps to take when warning signs are noticed:     Taking Action- It is important to take action if you are experiencing early warning signs of a relapse.  The faster you act, the more likely it is that you can avoid a full relapse.  It is helpful to identify several specific ways to cope with symptoms.       The following is my list of symptoms and coping strategies that I can use when they are present:     Symptom Coping Strategies   Anxiety -Talk with someone in your support system and let him or her know how you are feeling.  -Use relaxation techniques such as deep breathing or imagery.  -Use positive affirmations to counteract negative self-talk such as  I am learning to let  go of worry.    Depression - Schedule your day; include activities you have to do and activities you enjoy doing.  - Get some exercise - walk, run, bike, or swim.  - Give yourself credit for even the smallest things you get done.   Sleep Difficulties    - Go to sleep at the same time every day.  - Do something relaxing before bed, such as drinking herbal tea or listening to music.  - Avoid having discussions about upsetting topics before going to bed.   Delusions    - Distract yourself from the disturbing thought by doing something that requires your attention such as a puzzle.  - Check out your beliefs by talking to someone you trust.    Hallucinations    - Use headphones to listen to music.  - Tell voices to  stop  or say to yourself,  I am safe.   - Ignore the hallucinations as much as possible; focus on other things.   Concentration Difficulties - Minimize distractions so there is only one thing for you to focus on at a time.    - Ask the person you are having a conversation with to slow down or repeat things you are unsure of.

## 2022-10-31 NOTE — TELEPHONE ENCOUNTER
Phone call from Andrea Phoenix  ([phone],  Fax: [phone]  And Prakash Prasad to say that Prakash did want to start this week  Coordinated care with .    Prakash left a phone number: [phone]    Lisa Ling., D,  Licensed Clinical Psychologist

## 2022-10-31 NOTE — TELEPHONE ENCOUNTER
Pt called with AUGIE Rooney, and LVM expressing wanting to start ADT.    Wrtr called to conduct admission.    Discussed program.   Kristin Gongora RN on 10/31/2022 at 1:43 PM

## 2022-10-31 NOTE — PROGRESS NOTES
"RN Review of Medical History / Physical Health Screen  Outpatient Mental Health Programs - [facility] Adult Mental Health Day Treatment    PATIENT'S NAME: Ayana \"Prakash\"VINCENT Prasad  MRN:   [id number]  :   1990  ACCT. NUMBER: [id number]  CURRENT AGE:  32 year old    DATE OF DIAGNOSTIC ASSESSMENT: 10/19/22  DATE OF ADMISSION: 22     Please see Diagnostic Assessment for additional Medical History.     General Health:   Have you had any exposure to any communicable disease in the past 2-3 weeks? no     Are you aware of safe sex practices? yes   Do you have a history of seizures?     If so, do you have a seizure plan? Known triggers?     Notify patient that we will call 911 (if virtual) or a code (if in-person), if we were to witness seizure during group. no    no  no    n/a     Nutrition:    Are you on a special diet? If yes, please explain:  no   Do you have any concerns regarding your nutritional status? If yes, please explain:  yes - \"I don't think I eat right; I'm in pre-diabetic stage.\" [facility]   Have you had any appetite changes in the last 3 months?  No     Have you had any weight loss or weight gain in the last 3 months?  No     Do you have a history of an eating disorder? no   Do you have a history of being in an eating disorder program? no         Height/Weight Review:  Patient reported height:  5'7\"      Patient reports weight:  Date last checked:  230 pounds   a few days ago   Any referrals/needs identified?  none          Patient height and weight recorded by RN in epic flowsheet: No; Unable to measure  Programmatic Care currently provided via telehealth. All pt weights and heights will be collected through patient self-report and recorded in physical health screening progress note upon admission to the program.      BMI Review:  Was the patient informed of BMI? no      Findings See above         Fall Risk:   Have you had any falls in the past 3 " months? no     Do you currently use any assistive devices for mobility?   no      Does the patient have medication concerns? yes - wants to change - not working well enough; providers aware   If yes, RN to triage if patient will address concerns with their community provider or with our program psychiatrist.     Does the patient have any acute or chronic pain concerns that might impact participation in the program? yes - low and general back pain; working wth pain mgmt, also gets muscle relaxers and pain meds       Additional Comments/Assessment: Pt denies seizures (current/historical), dizziness, mobility concerns. No fall risk assessed; No safety concerns r/t falls.      Per completion of the Medical History / Physical Health Screen, is there a recommendation to see / follow up with a primary care physician/clinic or dentist?    No.      Kristin Gongora RN  10/31/2022

## 2022-11-01 ENCOUNTER — TELEPHONE (OUTPATIENT)
Dept: PSYCHIATRY | Facility: CLINIC | Age: 32
End: 2022-11-01

## 2022-11-01 ENCOUNTER — TRANSFERRED RECORDS (OUTPATIENT)
Dept: MULTI SPECIALTY CLINIC | Facility: CLINIC | Age: 32
End: 2022-11-01

## 2022-11-01 LAB — RETINOPATHY: NEGATIVE

## 2022-11-01 NOTE — TELEPHONE ENCOUNTER
Received 3 faxed pages from CM Andrea Phoenix at [facility] (VAL on file) requesting an updated medication list. Writer printed current med list and faxed 4 pages back to R at [phone]. Mercy Gonzalez, EMT

## 2022-11-02 DIAGNOSIS — R25.1 TREMOR: ICD-10-CM

## 2022-11-02 DIAGNOSIS — F41.9 ANXIETY: ICD-10-CM

## 2022-11-02 RX ORDER — PROPRANOLOL HYDROCHLORIDE 10 MG/1
10 TABLET ORAL 2 TIMES DAILY PRN
Qty: 30 TABLET | Refills: 0 | Status: SHIPPED | OUTPATIENT
Start: 2022-11-02 | End: 2022-11-15

## 2022-11-02 NOTE — TELEPHONE ENCOUNTER
Last seen: 10/11/22  RTC: 4 weeks   Cancel: none  No-show: none  Next appt: 11/15     Incoming refill from Patient via phone    Medication requested:   Pending Prescriptions:                       Disp   Refills    propranolol (INDERAL) 10 MG tablet        30 tab*1            Sig: Take 1 tablet (10 mg) by mouth 2 times daily as           needed (tremor and anxiety)        Medication sent to provider for review.

## 2022-11-02 NOTE — TELEPHONE ENCOUNTER
[facility] Call Center    Phone Message    May a detailed message be left on voicemail: yes     Reason for Call: Medication Question or concern regarding medication   Prescription Clarification  Name of Medication: Propranolol  Prescribing Provider: Regine Last   Pharmacy:   [facility] [place] [id number] - [place], [place] - [address]     What on the order needs clarification? Would like to discuss getting new orders to take propranolol 2x/day instead of 1x/day.  Would like a call back to [phone]          Action Taken: Other: nursing pool    Travel Screening: Not Applicable

## 2022-11-03 ENCOUNTER — HOSPITAL ENCOUNTER (OUTPATIENT)
Dept: BEHAVIORAL HEALTH | Facility: CLINIC | Age: 32
Discharge: HOME | End: 2022-11-03
Attending: PSYCHIATRY & NEUROLOGY
Payer: COMMERCIAL

## 2022-11-03 PROCEDURE — 90853 GROUP PSYCHOTHERAPY: CPT | Mod: GT,95 | Performed by: PSYCHOLOGIST

## 2022-11-03 PROCEDURE — 90853 GROUP PSYCHOTHERAPY: CPT | Mod: GT,95 | Performed by: SOCIAL WORKER

## 2022-11-03 NOTE — PROGRESS NOTES
"Adult Outpatient Programs  Individualized Treatment Plan       Date of Plan: 22    Name: \"Hector" Ayana Prasad MRN: [id number]    : 1990     Program: Adult Day Treatment Program (ADT)    Clinical Track: 6B    DSM5 Diagnosis:  295.70  (F25) Schizoaffective Disorder Bipolar Type.   300.02 (F41.1) Generalized Anxiety Disorder  309.81 (F43.10) Posttraumatic Stress Disorder (includes Posttraumatic Stress Disorder for Children 6 Years and Younger)  Without dissociative symptoms.   Attention-Deficit/Hyperactivity Disorder  314.01 (F90.9) Unspecified Attention -Deficit / Hyperactivity Disorder    ADT Multidisciplinary Team Members:  Dr. Oswaldo Seaman MD, Kacey Tena PsyD, LP, Nick Haynes, OTR/L, Kristin Gongora RN, BSN, Negra Gonzales, [facility]  Ayana Prasad will participate in the Adult Outpatient Programs Clinic Group; 3 day per week, 3 hours per day.   Anticipated duration/discharge: 12 weeks    Due to COVID-19, services will be delivered via telemedicine until further notice.     Program Start Date: 11/3/22  Anticipated Discharge Date: 23 (pending authorization/clinical changes)    Review Date: Does Ayana Prasad continue to meet criteria to participate in the ADT Program, 3 days per week; 3 hours per day?   22 Yes- CLARISSA Dow/L on 2022 at 1:56 PM   11/10/22 Yes John Ling, ROSALINA,  Licensed Clinical Psychologist 0921 am     2022 staffing Yes - Oswaldo Seaman MD on 2022 at 8:17 AM   2022 staffing Yes - Oswaldo Seaman MD on 2022 at 8:21 AM   23 (60 Days)    12/15/2022 Discharge  John Ling, ROSALINA,  Licensed Clinical Psychologist   6 am             Client Strengths:  has a previous history of therapy and support of family, friends and providers .     Client Participation in Plan:  Contributed to goals and plan     Areas of Vulnerability:  Suicidal Ideation   Psychotic symptoms/behavior   Anxiety  Depressive " "symptoms   Trauma/Abuse/Neglect  History of ADHD    Long-Term Goals:  Knowledge about illness and management of symptoms   Maintenance of personal safety     Abuse Prevention Plan:  Safe, therapeutic environment   Safety coping plan as needed   Education regarding illness and skill development   Coordination with care providers     Discharge Criteria:  Satisfactory progress toward treatment goals   Improvement re: identified problems and symptoms   Ability to continue recovery at next level of service   Has a discharge plan in place   Has safety/coping plan in place      Areas of Treatment Focus     Why are you seeking treatment/What do you want to focus on during treatment? \"Schizoaffective, Anxiety\".  The problem(s) began 05/18/17.  DA 10/19/22       Area of Treatment Focus:   Personal Safety  Start Date:    11/7/22    Goal:  Target Date: 12/15/22 Status: stopped  Prakash will notify staff when needing assistance to develop or implement a coping plan to manage suicidal or self-harm thoughts.Use coping plan for safety, as needed.      Progress:   11/7 Patient reports some passive suidal ideation over this past weekend but not \"too bad\". 11/10 Prakash reported being safe.  12/15 Prakash reported being safe.        Treatment Strategies:  Assess / reassess level of potential for harm to self or others  Engage in safety planning when indicated          Area of Treatment Focus:   Symptom Stabilization and Management  Start Date:    11/7/22    Goal:  Target Date: 12/15/22 Status: stopped  Prakash will learn and practice 1-2 coping skills to help improve management of \"anxiety\".  Prakash will practice 1-2 skills to lower the feeling of dread and anxiety in the morning.    Progress:   11/7 This is the only symptom patient wanted to address  11/10 Prakash reported anxiety and symptoms of panic, but uses skills to lower it, distraction, mindfulness coloring and walks with therapy dog, working with doctors, therapist, ADT, takes " "TERENs.   Prakash suggested a skill to listen to [employer] music, taking a shower, playing with the therapy dog, and getting a glass of milk to drink in the morning to lower anxiety in the morning.  12/15 Prakash reported skills of distraction, identifying emotions, negative thoughts, and using CBT and DBT skills, practicing deep breathing and mindfulness, attending individual therapy and psychiatry appointments.    Prakash reported feeling stronger and will discharge today.    Treatment Strategies:   Assess / reassess level of potential for harm to self or others  Engage in safety planning when indicated  Facilitate increased self awareness  Teach adaptive coping skills and communication skills          Area of Treatment Focus:  Wellness and Mental Health Recovery    Start Date:    11/7/22    Goal:  Target Date: 12/15/22 Status: stopped  Prakash will improve wellness related behaviors by getting adequate sleep(\"getting better but less sleep\"),exercise(\"walking once per day\"),balanced nutrition( more fruits and veggies \"on my plate\") and stress relief to maintain good mental health. Also as a part of recovery be thinking about your social supportive living situation and purpose.      Progress:    11/10 Prakash reported walks around [place] with therapy dog, eating healthy foods, and taking medications to sleep through the night.  Prakash reported that using a heating pad and medical marijuana helps reduce some of the chronic stomach pain felt each day, and will check if anxiety makes the pain worse.  12/15 Prakash reported feeling ill with the flu and had to rest, due to nausea. Prakash reported feeling better after a few days and decided to discharge.         Treatment Strategies:   Assess / reassess level of potential for harm to self or others  Engage in safety planning when indicated  Facilitate increased self awareness  Teach adaptive coping skills and communication skills          Area of Treatment Focus:   Community " Resources / Support and Discharge Planning  Start Date:    11/10/22    Goal:  Target Date: 12/15/22 Status: stopped  Prakash will establish an aftercare plan to include medical providers,individual therapy and social supports by discharge on 1/26/23.      Progress:   11/10/22 Prakash reported keeping appointments and being honest about not being compliant with medications. Prakash talks openly with medical providers.  12/15 Prakash reported attending appointments with providers as scheduled. Prakash will continue with individual therapy, psychiatry, [facility] and lives in a group home.        Treatment Strategies:   Assist clients in establishing / strengthening support network  Assist with discharge planning  Facilitate increased self awareness     Lorenzo Haynes, OTR/L      NOTE: Signatures are available on the Acknowledgement of Treatment Plan located in Chart Review    The Individualized Treatment Plan Signature Page has been routed to the provider for co-sign.    I have reviewed the patient's Individualized Treatment Plan and agree with the current goals, interventions and level of care.     John Ling, D,  Licensed Clinical Psychologist    11/10/2022, 12/15/22 discharge    I have reviewed the patient's Individualized Treatment Plan and agree with the current goals, interventions and level of care.     Oswaldo Seaman MD  11/14/2022, 12/12/2022
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
## 2022-11-03 NOTE — GROUP NOTE
Process Group Note                                    Service Modality:  Video Visit     Telemedicine Visit: The patient's condition can be safely assessed and treated via synchronous audio and visual telemedicine encounter.      Reason for Telemedicine Visit: Patient has requested telehealth visit, Patient unable to travel, Patient convenience (e.g. access to timely appointments / distance to available provider), Patient lives in a designated Health Professional Shortage Area (HPSA), and Services only offered telehealth    Originating Site (Patient Location): Patient's home    Distant Site (Provider Location): [facility]: [facility], [facility]    Consent:  The patient/guardian has verbally consented to: the potential risks and benefits of telemedicine (video visit) versus in person care; bill my insurance or make self-payment for services provided; and responsibility for payment of non-covered services.     Patient would like the video invitation sent by:  My Chart    Mode of Communication:  Video Conference via Medical Zoom    As the provider I attest to compliance with applicable laws and regulations related to telemedicine.        PATIENT'S NAME: Ayana Prasad  MRN:   [id number]  :   1990  ACCT. NUMBER: [id number]  DATE OF SERVICE: 22  START TIME:  1:00 PM  END TIME:  1:50 PM  FACILITATOR: Kacey Tena PsyD  TOPIC:  Process Group    Diagnoses:     Clinical Substantiation  Summary: Patient is a 32 year old transgender male with a mental health history ADHD, PTSD, anxiety and schizoaffective disorder. Patient did not provide any other mental health diagnosis, but medical records state: .      295.70  (F25) Schizoaffective Disorder Bipolar Type.  300.02 (F41.1) Generalized Anxiety Disorder  309.81 (F43.10) Posttraumatic Stress Disorder (includes Posttraumatic Stress Disorder for Children 6 Years and Younger)  Without dissociative symptoms.  314.01 (F90.9) Unspecified Attention  -Deficit / Hyperactivity Disorder         ADHD (attention deficit hyperactivity disorder)       Bipolar 1 disorder       Borderline personality disorder       Cauda equina syndrome       Chronic low back pain       Depression       GERD (gastroesophageal reflux disease)       h/o TBI (traumatic brain injury)       Hypertension, unspecified type 12/16/2021     Marginal corneal ulcer, left 07/17/2015     Nephrolithiasis       obesity       Polysubstance abuse - methamphetamine, hallucinagen, heroin, marijuana       currently in remission     PONV (postoperative nausea and vomiting)       PTSD (post-traumatic stress disorder)        Psychiatry: Murali Gonzalez, CNP, [facility]  Therapy: Deborah Rosas, [facility]  :  Mental Health Resources: Vu [place]: [phone]  Fax: [phone]  [place] Primary Care Provider: Dr. Becki Avery      [facility] Adult Mental Health Day Treatment  TRACK: 6B    NUMBER OF PARTICIPANTS: 5          Data:    Session content: At the start of this group, patients were invited to check in by identifying themselves, describing their current emotional status, and identifying issues to address in this group.   Area(s) of treatment focus addressed in this session included Symptom Management, Personal Safety and Community Resources/Discharge Planning.  Client reported being safe today.  Reported mood is ok.    Goal for today is to attend group therapy online and talk with others. The client talked to the group about how they are sleeping ok, trying to get exercise on a regular basis, and eat healthy foods. Prakash reported problems with anxiety and panic attacks, visual and auditory hallucinations. They talked to the group about their service dog that is a black lab and how the other 5 roommates help care for the dog and walk it.       Therapeutic Interventions/Treatment Strategies:  Psychotherapist reinforced use of skills. Treatment modalities used include  Cognitive Behavioral Therapy and Dialectical Behavioral Therapy. Interventions include Coping Skills: Promoted understanding of how and when to apply grounding strategies to reduce distress and increase presence in the moment, Relapse Prevention: Coached on skills to manage factors that contribute to relapse, Mindfulness: Facilitated discussion of when/how to use mindfulness skills to benefit general health, mental health symptoms, and stressors and Symptoms Management: Promoted understanding of their diagnoses and how it impacts their functioning.    Assessment:    Patient response:   Patient responded to session by giving feedback, listening and being attentive    Possible barriers to participation / learning include: severity of symptoms    Health Issues:   None reported       Substance Use Review:   Substance Use: No active concerns identified.    Mental Status/Behavioral Observations  Appearance:   Appropriate   Eye Contact:   Good   Psychomotor Behavior: Normal   Attitude:   Cooperative   Orientation:   All  Speech   Rate / Production: Normal    Volume:  Normal   Mood:    Anxious  Depressed   Affect:    Constricted   Thought Content:   Rumination and Psychosis reports hallucinations auditory and visual  Thought Form:  Coherent  Logical  Psychosis    Insight:    Good     Plan:     Safety Plan: Recommended that patient call 911 or go to the local ED should there be a change in any of these risk factors.     Barriers to treatment: None identified    Patient Contracts (see media tab):  None    Substance Use: Provided encouragement towards sobriety     Continue or Discharge: Patient will continue in Adult Day Treatment (ADT)  as planned. Patient is likely to benefit from learning and using skills as they work toward the goals identified in their treatment plan.      Kacey Tena PsyD  November 3, 2022  John Ling, ROSALINA,  Licensed Clinical Psychologist

## 2022-11-03 NOTE — GROUP NOTE
Psychoeducation Group Note                                    Service Modality:  Video Visit     Telemedicine Visit: The patient's condition can be safely assessed and treated via synchronous audio and visual telemedicine encounter.      Reason for Telemedicine Visit: Patient has requested telehealth visit, Patient unable to travel, Patient convenience (e.g. access to timely appointments / distance to available provider), Patient lives in a designated Health Professional Shortage Area (HPSA), and Services only offered telehealth    Originating Site (Patient Location): Patient's home    Distant Site (Provider Location): [facility]: [facility], [facility]    Consent:  The patient/guardian has verbally consented to: the potential risks and benefits of telemedicine (video visit) versus in person care; bill my insurance or make self-payment for services provided; and responsibility for payment of non-covered services.     Patient would like the video invitation sent by:  My Chart    Mode of Communication:  Video Conference via Medical Zoom    As the provider I attest to compliance with applicable laws and regulations related to telemedicine.        PATIENT'S NAME: Ayana Prasad  MRN:   [id number]  :   1990  ACCT. NUMBER: [id number]  DATE OF SERVICE: 22  START TIME:  2:00 PM  END TIME:  2:50 PM  FACILITATOR: Kacey Tena PsyD; Kevin Patel RN  TOPIC:  Wellness Group: Mental Health Maintenance  [facility] Adult Mental Health Day Treatment  TRACK: 6B    NUMBER OF PARTICIPANTS: 5    Summary of Group / Topics Discussed:  Mental Health Maintenance:  Letting Go of Stress: Patients viewed 20 minute video which demonstrated simple proven techniques too quickly and effectively release stress.     Patient Session Goals / Objectives:  ? Patients discovered quick, easy and effective stress reduction techniques  ? Patients practiced techniques that were demonstrated on the video  ? Patients  identified one technique they will use to cope with symptoms        Patient Participation / Response:  Fully participated with the group by sharing personal reflections / insights and openly received / provided feedback with other participants.    Verbalized understanding of mental health maintenance topic  Prakash Talked to the group about stress and how anxiety and panic attacks were trouble for them. He stated that he tries to do self-cares each day as taking care of symptoms.  Treatment Plan:  Patient has a current master individualized treatment plan.  See Epic treatment plan for more information.    Amanda Meléndez Psy., ROSALINA,  Licensed Clinical Psychologist       Billing Type: Third-Party Bill

## 2022-11-03 NOTE — GROUP NOTE
Psychoeducation Group Note    PATIENT'S NAME: Ayana Prasad  MRN:   [id number]  :   1990  ACCT. NUMBER: [id number]  DATE OF SERVICE: 22  START TIME:  3:00 PM  END TIME:  3:50 PM  FACILITATOR: Negra Gonzales LICSW; Lorenzo Haynes, OTR/L  TOPIC: MH Life Skills Group: Resiliency Development  Service Modality:  Video Visit     Telemedicine Visit: The patient's condition can be safely assessed and treated via synchronous audio and visual telemedicine encounter.       Reason for Telemedicine Visit: Services only offered telehealth and due to COVID-19.     Originating Site (Patient Location): Patient's home     Distant Site (Provider Location): Provider Remote Setting- Home Office     Consent:  The patient/guardian has verbally consented to: the potential risks and benefits of telemedicine (video visit) versus in person care; bill my insurance or make self-payment for services provided; and responsibility for payment of non-covered services.      Patient would like the video invitation sent by:  My Chart     Mode of Communication:  Video Conference via Medical Zoom     As the provider I attest to compliance with applicable laws and regulations related to telemedicine.    [facility] Adult Mental Health Day Treatment  TRACK: 6B    NUMBER OF PARTICIPANTS: 5    Summary of Group / Topics Discussed:  Resiliency Development:  Coping Skills (PROM): Patients were taught how to identify stressors, signs of stress, coping skills, and prevention strategies for overall stress management.  Patients were given the opportunity to identify both ongoing and acute mental health symptoms and how to effectively manage these symptoms by developing an effective aftercare plan.  Patients increased awareness of community based resources.    Patient Session Goals / Objectives:    Identified how using coping skills can be used for symptom and stress management       Improved awareness of individualed symptoms and  stressors and how to effectively cope     Established a relapse prevention plan to practice these skills in their own environments    Practiced and reflected on how to generalize taught skills to their everyday life    Completed and discussed the PROM (Personal Recovery Outcome Measure).        Patient Participation / Response:  Fully participated with the group by sharing personal reflections / insights and openly received / provided feedback with other participants.    Verbalized understanding of content    Treatment Plan:  Patient has a current master individualized treatment plan.  See Epic treatment plan for more information.    eNgra Gonzales, BOBSW

## 2022-11-04 ENCOUNTER — E-VISIT (OUTPATIENT)
Dept: FAMILY MEDICINE | Facility: CLINIC | Age: 32
End: 2022-11-04
Payer: COMMERCIAL

## 2022-11-04 DIAGNOSIS — R51.9 ACUTE INTRACTABLE HEADACHE, UNSPECIFIED HEADACHE TYPE: Primary | ICD-10-CM

## 2022-11-04 PROCEDURE — 99207 PR NON-BILLABLE SERV PER CHARTING: CPT | Performed by: NURSE PRACTITIONER

## 2022-11-07 ENCOUNTER — HOSPITAL ENCOUNTER (OUTPATIENT)
Dept: BEHAVIORAL HEALTH | Facility: CLINIC | Age: 32
Discharge: HOME | End: 2022-11-07
Attending: PSYCHIATRY & NEUROLOGY
Payer: COMMERCIAL

## 2022-11-07 PROCEDURE — 90853 GROUP PSYCHOTHERAPY: CPT | Mod: GT,95 | Performed by: PSYCHOLOGIST

## 2022-11-07 PROCEDURE — 90853 GROUP PSYCHOTHERAPY: CPT | Mod: GT,95 | Performed by: SOCIAL WORKER

## 2022-11-07 NOTE — GROUP NOTE
Psychoeducation Group Note                                    Service Modality:  Video Visit     Telemedicine Visit: The patient's condition can be safely assessed and treated via synchronous audio and visual telemedicine encounter.      Reason for Telemedicine Visit: Patient has requested telehealth visit, Patient unable to travel, Patient convenience (e.g. access to timely appointments / distance to available provider), Patient lives in a designated Health Professional Shortage Area (HPSA), and Services only offered telehealth    Originating Site (Patient Location): Patient's home    Distant Site (Provider Location): [facility]: [facility], [facility]    Consent:  The patient/guardian has verbally consented to: the potential risks and benefits of telemedicine (video visit) versus in person care; bill my insurance or make self-payment for services provided; and responsibility for payment of non-covered services.     Patient would like the video invitation sent by:  My Chart    Mode of Communication:  Video Conference via Medical Zoom    As the provider I attest to compliance with applicable laws and regulations related to telemedicine.        PATIENT'S NAME: Ayana Prasad  MRN:   [id number]  :   1990  ACCT. NUMBER: [id number]  DATE OF SERVICE: 22  START TIME:  2:00 PM  END TIME:  2:50 PM  FACILITATOR: Kacey Tena PsyD; Kristin Gongora RN  TOPIC:  Wellness Group: Health Maintenance  [facility] Adult Mental Health Day Treatment  TRACK: 6B    NUMBER OF PARTICIPANTS: 3    Summary of Group / Topics Discussed:  Health Maintenance: Goal Setting: Meaningful goals can bring a sense of direction and purpose in life.  They also highlight our most important values. Patients were assisted by instructor to identify short term goals to promote their mental health recovery and improve overall health and wellness. Patients were educated on SMART goal setting framework as a strategy to  increase outcomes and promote success.    Patient Session Goals / Objectives:  ? Explained the key concepts of SMART goal setting framework  ? Identified three goals successfully using SMART goal setting framework  ? Reviewed concept of balance in wellness as it pertains to goal setting        Patient Participation / Response:  Fully participated with the group by sharing personal reflections / insights and openly received / provided feedback with other participants.    Identified / Expressed personal readiness to practice skills  Prakash reported that he was coloring for meditation and relaxation. He reported that he takes care of his dog each day and talked to the group about the emotional service dog.    Treatment Plan:  Patient has a current master individualized treatment plan.  See Epic treatment plan for more information.    Amanda Meléndez Psy., D,  Licensed Clinical Psychologist

## 2022-11-07 NOTE — GROUP NOTE
Process Group Note                                    Service Modality:  Video Visit     Telemedicine Visit: The patient's condition can be safely assessed and treated via synchronous audio and visual telemedicine encounter.      Reason for Telemedicine Visit: Patient has requested telehealth visit, Patient unable to travel, Patient convenience (e.g. access to timely appointments / distance to available provider), Patient lives in a designated Health Professional Shortage Area (HPSA), and Services only offered telehealth    Originating Site (Patient Location): Patient's home    Distant Site (Provider Location): [facility]: [facility], [facility]    Consent:  The patient/guardian has verbally consented to: the potential risks and benefits of telemedicine (video visit) versus in person care; bill my insurance or make self-payment for services provided; and responsibility for payment of non-covered services.     Patient would like the video invitation sent by:  My Chart    Mode of Communication:  Video Conference via Medical Zoom    As the provider I attest to compliance with applicable laws and regulations related to telemedicine.        PATIENT'S NAME: Ayana Prasad  MRN:   [id number]  :   1990  ACCT. NUMBER: [id number]  DATE OF SERVICE: 22  START TIME:  1:00 PM  END TIME:  1:50 PM  FACILITATOR: Kacey Tena PsyD  TOPIC:  Process Group    DiagnSummary: Patient is a 32 year old transgender male with a mental health history ADHD, PTSD, anxiety and schizoaffective disorder. Patient did not provide any other mental health diagnosis, but medical records state: .      295.70  (F25) Schizoaffective Disorder Bipolar Type.  300.02 (F41.1) Generalized Anxiety Disorder  309.81 (F43.10) Posttraumatic Stress Disorder (includes Posttraumatic Stress Disorder for Children 6 Years and Younger)  Without dissociative symptoms.  314.01 (F90.9) Unspecified Attention -Deficit / Hyperactivity Disorder      "    ADHD (attention deficit hyperactivity disorder)       Bipolar 1 disorder       Borderline personality disorder       Cauda equina syndrome       Chronic low back pain       Depression       GERD (gastroesophageal reflux disease)       h/o TBI (traumatic brain injury)       Hypertension, unspecified type 12/16/2021     Marginal corneal ulcer, left 07/17/2015     Nephrolithiasis       obesity       Polysubstance abuse - methamphetamine, hallucinagen, heroin, marijuana       currently in remission     PONV (postoperative nausea and vomiting)       PTSD (post-traumatic stress disorder)        Psychiatry: Murali Gonzalez, CNP, [facility]  Therapy: Deborah Rosas, [facility]  :  Mental Health Resources: [facility] [place]: [phone]  Fax: [phone]  [facility] Primary Care Provider: Dr. Becki andrews:      [facility] Adult Mental Health Day Treatment  TRACK: 6B    NUMBER OF PARTICIPANTS: 3          Data:    Session content: At the start of this group, patients were invited to check in by identifying themselves, describing their current emotional status, and identifying issues to address in this group.   Area(s) of treatment focus addressed in this session included Symptom Management, Personal Safety and Community Resources/Discharge Planning.  Client reported being safe today.  Reported mood is \"ok.\"   Goal for today is to attend group therapy online and talk with others.  The client talked to the group about how he stayed inside and relaxed at home. He reported feeling support from friends and family. He stated that he walked his dog regularly and goes out each day. He stated that he met with his psychiatrist and they talked about being compliant with medications. The group discussed where to put medications, side-effects, and other reactions to medications. He stated that coloring a mandala was helpful, being with his dog, meditation, and listening to music were helpful. He stated " that sleep was good lately and he was able to sleep through the night.       Therapeutic Interventions/Treatment Strategies:  Psychotherapist reinforced use of skills. Treatment modalities used include Cognitive Behavioral Therapy and Dialectical Behavioral Therapy. Interventions include Coping Skills: Promoted understanding of how and when to apply grounding strategies to reduce distress and increase presence in the moment, Relapse Prevention: Coached on skills to manage factors that contribute to relapse, Mindfulness: Facilitated discussion of when/how to use mindfulness skills to benefit general health, mental health symptoms, and stressors and Symptoms Management: Promoted understanding of their diagnoses and how it impacts their functioning.    Assessment:    Patient response:   Patient responded to session by focusing on goals, being attentive and appearing alert    Possible barriers to participation / learning include: severity of symptoms    Health Issues:   None reported       Substance Use Review:   Substance Use: No active concerns identified.    Mental Status/Behavioral Observations  Appearance:   Appropriate   Eye Contact:   Good   Psychomotor Behavior: Normal   Attitude:   Cooperative   Orientation:   All  Speech   Rate / Production: Normal    Volume:  Normal   Mood:    Anxious  Depressed  Sad   Affect:    Constricted   Thought Content:   Rumination and Psychosis reports hallucinations auditory and visual  Thought Form:  Coherent  Logical  Psychosis    Insight:    Good     Plan:     Safety Plan: Recommended that patient call 911 or go to the local ED should there be a change in any of these risk factors.     Barriers to treatment: None identified    Patient Contracts (see media tab):  None    Substance Use: Provided encouragement towards sobriety     Continue or Discharge: Patient will continue in Adult Day Treatment (ADT)  as planned. Patient is likely to benefit from learning and using skills as they  work toward the goals identified in their treatment plan.      Kacey Tena PsyD  November 7, 2022  John Ling, ROSALINA,  Licensed Clinical Psychologist

## 2022-11-07 NOTE — PROGRESS NOTES
Acknowledgement of Current Treatment Plan       I have reviewed my treatment plan with my therapist on 11/7/22.   I agree with the plan as it is written in the electronic health record. (6B)    Name:      Signature:  Ayana KAUR Shanice Unable to sign due to Virtual and COVID     Oswaldo Seaman MD  Psychiatrist/Medical Director    Kacey Tena PsyD, LP  Psychotherapist John Ling, D,  Licensed Clinical Psychologist     Nick Haynes OTR/L CLARISSA Dow/L on 11/7/2022 at 3:56 PM

## 2022-11-07 NOTE — GROUP NOTE
Psychotherapy Group Note    PATIENT'S NAME: Ayana Prasad  MRN:   [id number]  :   1990  ACCT. NUMBER: [id number]  DATE OF SERVICE: 22  START TIME:  3:00 PM  END TIME:  3:50 PM  FACILITATOR: Negra Gonzales LICSW; Lorenzo Haynes, OTR/L  TOPIC: MH EBP Group: Self-Awareness  Service Modality:  Video Visit     Telemedicine Visit: The patient's condition can be safely assessed and treated via synchronous audio and visual telemedicine encounter.       Reason for Telemedicine Visit: Services only offered telehealth and due to COVID-19.     Originating Site (Patient Location): Patient's home     Distant Site (Provider Location): Provider Remote Setting- Home Office     Consent:  The patient/guardian has verbally consented to: the potential risks and benefits of telemedicine (video visit) versus in person care; bill my insurance or make self-payment for services provided; and responsibility for payment of non-covered services.      Patient would like the video invitation sent by:  My Chart     Mode of Communication:  Video Conference via Medical Zoom     As the provider I attest to compliance with applicable laws and regulations related to telemedicine.    [facility] Adult Mental Health Day Treatment  TRACK: 6B  NUMBER OF PARTICIPANTS: 3    Summary of Group / Topics Discussed:  Self-Awareness: Self-Compassion: Patients received overview of key concepts in developing self-compassion. Patients discussed mindfulness, self-kindness, and finding common humanity. Patients identified their current approach to problems in their lives and learned skills for increasing self-compassion. Patients identified ways they can put self-compassion skills into practice and problem solve barriers to application of skills.     Patient Session Goals / Objectives:    [place] components of self-compassion    Identify ways to practice self-compassion in daily life    Problem solve barriers to self-compassion  "practice    Education through video, \"Finding Self Love & Self Compassion with Schizophrenia and Schizoaffective Disorder\" with Galina Vernon.      Patient Participation / Response:  Fully participated with the group by sharing personal reflections / insights and openly received / provided feedback with other participants.    Demonstrated understanding of topics discussed through group discussion and participation and Practiced skills in session    Treatment Plan:  Patient has a current master individualized treatment plan.  See Epic treatment plan for more information.    Negra Gonzales, LICSW     "

## 2022-11-07 NOTE — ED NOTES
I agree with your advice  Mom should come in for an appointment if she has any additional concerns  Otherwise, we can discuss at her next well check up  Report called to SRINIVAS Rivero at [facility].

## 2022-11-08 ENCOUNTER — TELEPHONE (OUTPATIENT)
Dept: PSYCHIATRY | Facility: CLINIC | Age: 32
End: 2022-11-08

## 2022-11-08 ENCOUNTER — HOSPITAL ENCOUNTER (OUTPATIENT)
Dept: BEHAVIORAL HEALTH | Facility: CLINIC | Age: 32
Discharge: HOME | End: 2022-11-08
Attending: PSYCHIATRY & NEUROLOGY
Payer: COMMERCIAL

## 2022-11-08 PROCEDURE — 90853 GROUP PSYCHOTHERAPY: CPT | Mod: GT,95 | Performed by: PSYCHOLOGIST

## 2022-11-08 PROCEDURE — 90853 GROUP PSYCHOTHERAPY: CPT | Mod: GT,95 | Performed by: SOCIAL WORKER

## 2022-11-08 NOTE — TELEPHONE ENCOUNTER
Called back and discuss with CM on recommitment.  Provided that previously pt initially did not want to be on commitment and this writer supported not being on a commitment, but at the last minute, pt called previous CM requesting recommitment.  Pt did not sound like he has been using any substance and CM thought he may have mentioned about substance use just to keep commitment in the past.  CM also noted she has not observed any response to internal stimuli or psychotic sxs.  Discussed this writer's impression that he may be experiencing PTSD, reporting psychosis, but this may be flashbacks though previous substance use could have caused psychosis. Discussed this writer would support both commitment or not to continue commitment depending on what pt may be looking for.  Pt would benefit from CM regardless of commitment and CM can provide this.  Also discussed pt appeared to start day treatment, but did not continue PHP. She also asked if he was started on Clozaril. Discussed pt wanted to try Clozaril as current and previous antipsychotics trial has not been helpful sufficiently but seemed not to want to try Clozaril any longer after learning about need for blood draw. CM will be present in next appt.  If pt needs recommitment, pt needs to have exam between 12/17-12/31.  Regine Last, CNP, 11/8/2022

## 2022-11-08 NOTE — TELEPHONE ENCOUNTER
[facility] Call Center    Phone Message    May a detailed message be left on voicemail: yes     Reason for Call: Other: Vu () would like provider's opinion on patient's recommitment (helping? Should it be dropped?) and update on progress. Detailed voicemail can be left on secure line--[phone]     Action Taken: Message routed to:  Other: Provider    Travel Screening: Not Applicable

## 2022-11-08 NOTE — GROUP NOTE
Psychotherapy Group Note                                    Service Modality:  Video Visit     Telemedicine Visit: The patient's condition can be safely assessed and treated via synchronous audio and visual telemedicine encounter.      Reason for Telemedicine Visit: Patient has requested telehealth visit, Patient unable to travel, Patient convenience (e.g. access to timely appointments / distance to available provider), Patient lives in a designated Health Professional Shortage Area (HPSA), and Services only offered telehealth    Originating Site (Patient Location): Patient's home    Distant Site (Provider Location): [facility]: [facility], [facility]    Consent:  The patient/guardian has verbally consented to: the potential risks and benefits of telemedicine (video visit) versus in person care; bill my insurance or make self-payment for services provided; and responsibility for payment of non-covered services.     Patient would like the video invitation sent by:  My Chart    Mode of Communication:  Video Conference via Medical Zoom    As the provider I attest to compliance with applicable laws and regulations related to telemedicine.        PATIENT'S NAME: Ayana Prasad  MRN:   [id number]  :   1990  ACCT. NUMBER: [id number]  DATE OF SERVICE: 22  START TIME:  2:00 PM  END TIME:  2:50 PM  FACILITATOR: Kacey Tena PsyD  TOPIC:  EBP Group: Cognitive Restructuring  [facility] Adult Mental Health Day Treatment  TRACK: 6B    NUMBER OF PARTICIPANTS: 4    Summary of Group / Topics Discussed:  Cognitive Restructuring: Distortions: Patients received an overview of how to identify common cognitive distortions. Patients will explore alternatives to cognitive distortions and practice challenging their negative thought patterns. The goal is to help patients target modify ineffective thought patterns.     Patient Session Goals / Objectives:    Familiarized self with ineffective / unhealthy  thoughts and how they develop.      Explored impact of ineffective thoughts / distortions on mood and activity    Formulated new neutral/positive alternatives to challenge less helpful / ineffective thoughts.    Practiced and plan to apply in daily life               Patient Participation / Response:  Fully participated with the group by sharing personal reflections / insights and openly received / provided feedback with other participants.    Expressed understanding of the relationship between behaviors, thoughts, and feelings  Prakash reported problems with anxiety, helplessness, and some boredom and identified negative thoughts around the anxiety of feeling bad.  Prakash talked to the group about [place].    Treatment Plan:  Patient has a current master individualized treatment plan.  See Epic treatment plan for more information.    Amanda Meléndez Psy., D,  Licensed Clinical Psychologist

## 2022-11-08 NOTE — GROUP NOTE
Process Group Note                                    Service Modality:  Video Visit     Telemedicine Visit: The patient's condition can be safely assessed and treated via synchronous audio and visual telemedicine encounter.      Reason for Telemedicine Visit: Patient has requested telehealth visit, Patient unable to travel, Patient convenience (e.g. access to timely appointments / distance to available provider), Patient lives in a designated Health Professional Shortage Area (HPSA), and Services only offered telehealth    Originating Site (Patient Location): Patient's home    Distant Site (Provider Location): [facility]: [facility], [facility]    Consent:  The patient/guardian has verbally consented to: the potential risks and benefits of telemedicine (video visit) versus in person care; bill my insurance or make self-payment for services provided; and responsibility for payment of non-covered services.     Patient would like the video invitation sent by:  My Chart    Mode of Communication:  Video Conference via Medical Zoom    As the provider I attest to compliance with applicable laws and regulations related to telemedicine.        PATIENT'S NAME: Ayana Prasad  MRN:   [id number]  :   1990  ACCT. NUMBER: [id number]  DATE OF SERVICE: 22  START TIME:  1:00 PM  END TIME:  1:50 PM  FACILITATOR: Kacey Tena PsyD  TOPIC:  Process Group    Diagnoses:  Summary: Patient is a 32 year old transgender male with a mental health history ADHD, PTSD, anxiety and schizoaffective disorder. Patient did not provide any other mental health diagnosis, but medical records state: .      295.70  (F25) Schizoaffective Disorder Bipolar Type.  300.02 (F41.1) Generalized Anxiety Disorder  309.81 (F43.10) Posttraumatic Stress Disorder (includes Posttraumatic Stress Disorder for Children 6 Years and Younger)  Without dissociative symptoms.  314.01 (F90.9) Unspecified Attention -Deficit / Hyperactivity  Disorder         ADHD (attention deficit hyperactivity disorder)       Bipolar 1 disorder       Borderline personality disorder       Cauda equina syndrome       Chronic low back pain       Depression       GERD (gastroesophageal reflux disease)       h/o TBI (traumatic brain injury)       Hypertension, unspecified type 12/16/2021     Marginal corneal ulcer, left 07/17/2015     Nephrolithiasis       obesity       Polysubstance abuse - methamphetamine, hallucinagen, heroin, marijuana       currently in remission     PONV (postoperative nausea and vomiting)       PTSD (post-traumatic stress disorder)        Psychiatry: Murali Gonzalez, CNP, [facility]  Therapy: Deborah Rosas, [facility]  :  Mental Health Resources: [facility] [place]: [phone]  Fax: [phone]  [place] Primary Care Provider: Dr. Becki Avery      [facility] Adult Mental Health Day Treatment  TRACK: 6B    NUMBER OF PARTICIPANTS: 4          Data:    Session content: At the start of this group, patients were invited to check in by identifying themselves, describing their current emotional status, and identifying issues to address in this group.   Area(s) of treatment focus addressed in this session included Symptom Management, Personal Safety and Community Resources/Discharge Planning.  Client reported being safe today.  Reported mood is anxious and overwhelmed.    Goal for today is to attend group therapy online and talk with others.  The client talked to the group about how he woke feeling anxious and had a knot in his stomach. He talked to the group about ongoing anxiety.      Therapeutic Interventions/Treatment Strategies:  Psychotherapist reinforced use of skills. Treatment modalities used include Cognitive Behavioral Therapy and Dialectical Behavioral Therapy. Interventions include Coping Skills: Discussed meditation skills and addressed ways to implement meditation skills , Relapse Prevention: Assisted patient in  "identifying personal vulnerabilities, thoughts, emotions, and situations that may lead to relapse , Mindfulness: Facilitated discussion of when/how to use mindfulness skills to benefit general health, mental health symptoms, and stressors and Symptoms Management: Promoted understanding of their diagnoses and how it impacts their functioning.    Assessment:    Patient response:   Patient responded to session by giving feedback, listening, focusing on goals and accepting support    Possible barriers to participation / learning include: severity of symptoms    Health Issues:   Yes: anxiety with a stomach \"tied up in knots\"       Substance Use Review:   Substance Use: No active concerns identified.    Mental Status/Behavioral Observations  Appearance:   Appropriate   Eye Contact:   Good   Psychomotor Behavior: Normal   Attitude:   Cooperative   Orientation:   All  Speech   Rate / Production: Normal    Volume:  Normal   Mood:    Anxious  Depressed  Sad   Affect:    Constricted   Thought Content:   Rumination and Psychosis reports hallucinations auditory and visual  Thought Form:  Coherent  Logical  Psychosis    Insight:    Good     Plan:     Safety Plan: Recommended that patient call 911 or go to the local ED should there be a change in any of these risk factors.     Barriers to treatment: None identified    Patient Contracts (see media tab):  None    Substance Use: Provided encouragement towards sobriety     Continue or Discharge: Patient will continue in Adult Day Treatment (ADT)  as planned. Patient is likely to benefit from learning and using skills as they work toward the goals identified in their treatment plan.      Kacey Tena PsyD  November 8, 2022  John Ling D,  Licensed Clinical Psychologist    "

## 2022-11-08 NOTE — GROUP NOTE
Psychoeducation Group Note    PATIENT'S NAME: Ayana Prasad  MRN:   [id number]  :   1990  ACCT. NUMBER: [id number]  DATE OF SERVICE: 22  START TIME:  3:00 PM  END TIME:  3:50 PM  FACILITATOR: Negra Gonzales LICSW; Lorenzo Haynes, OTR/L  TOPIC: MH Life Skills Group: Resiliency Development  Service Modality:  Video Visit     Telemedicine Visit: The patient's condition can be safely assessed and treated via synchronous audio and visual telemedicine encounter.       Reason for Telemedicine Visit: Services only offered telehealth and due to COVID-19.     Originating Site (Patient Location): Patient's home     Distant Site (Provider Location): Provider Remote Setting- Home Office     Consent:  The patient/guardian has verbally consented to: the potential risks and benefits of telemedicine (video visit) versus in person care; bill my insurance or make self-payment for services provided; and responsibility for payment of non-covered services.      Patient would like the video invitation sent by:  My Chart     Mode of Communication:  Video Conference via Medical Zoom     As the provider I attest to compliance with applicable laws and regulations related to telemedicine.    [facility] Adult Mental Health Day Treatment  TRACK: 6B    NUMBER OF PARTICIPANTS: 4    Summary of Group / Topics Discussed:  Resiliency Development:  Coping Skills (Stress Management): Patients were taught how to identify stressors, signs of stress, coping skills, and prevention strategies for overall stress management.  Patients were given the opportunity to identify both ongoing and acute mental health symptoms and how to effectively manage these symptoms by developing an effective aftercare plan.  Patients increased awareness of community based resources.    Patient Session Goals / Objectives:    Identified how using coping skills can be used for symptom and stress management       Improved awareness of individualed  symptoms and stressors and how to effectively cope     Established a relapse prevention plan to practice these skills in their own environments    Practiced and reflected on how to generalize taught skills to their everyday life    Education about the physiology of stress and anxiety        Patient Participation / Response:  Fully participated with the group by sharing personal reflections / insights and openly received / provided feedback with other participants.    Verbalized understanding of content    Treatment Plan:  Patient has a current master individualized treatment plan.  See Epic treatment plan for more information.    Negra Gonzales, LICSW

## 2022-11-10 ENCOUNTER — TELEPHONE (OUTPATIENT)
Dept: BEHAVIORAL HEALTH | Facility: CLINIC | Age: 32
End: 2022-11-10

## 2022-11-10 ENCOUNTER — HOSPITAL ENCOUNTER (OUTPATIENT)
Dept: BEHAVIORAL HEALTH | Facility: CLINIC | Age: 32
Discharge: HOME | End: 2022-11-10
Attending: PSYCHIATRY & NEUROLOGY
Payer: COMMERCIAL

## 2022-11-10 ENCOUNTER — TELEPHONE (OUTPATIENT)
Dept: PSYCHIATRY | Facility: CLINIC | Age: 32
End: 2022-11-10

## 2022-11-10 PROCEDURE — 90853 GROUP PSYCHOTHERAPY: CPT | Mod: GT,95 | Performed by: PSYCHOLOGIST

## 2022-11-10 NOTE — TELEPHONE ENCOUNTER
Writer returned patient's call. LVM with call back number. It appears that patient was in group therapy when he called.

## 2022-11-10 NOTE — TELEPHONE ENCOUNTER
[facility] Call Center    Phone Message    May a detailed message be left on voicemail: yes     Reason for Call: pt called and asked to speak to Regine's nurse Lucie regarding anxiety symptoms. Pt was informed that Lucie was out and writer would try to connect him to Deena instead. While trying to connect to nurse, the pt hung up.    Action Taken: Message routed to:  Other: p psychiatry nurse    Travel Screening: Not Applicable

## 2022-11-10 NOTE — GROUP NOTE
Psychoeducation Group Note    PATIENT'S NAME: Ayana Prasad  MRN:   [id number]  :   1990  ACCT. NUMBER: [id number]  DATE OF SERVICE: 11/10/22  START TIME:  3:00 PM  END TIME:  3:50 PM  FACILITATOR: Negra Gonzales LICSW; Lorenzo Haynes, OTR/L  TOPIC: MH Life Skills Group: Resiliency Development  Service Modality:  Video Visit     Telemedicine Visit: The patient's condition can be safely assessed and treated via synchronous audio and visual telemedicine encounter.       Reason for Telemedicine Visit: Services only offered telehealth and due to COVID-19.     Originating Site (Patient Location): Patient's home     Distant Site (Provider Location): Provider Remote Setting- Home Office     Consent:  The patient/guardian has verbally consented to: the potential risks and benefits of telemedicine (video visit) versus in person care; bill my insurance or make self-payment for services provided; and responsibility for payment of non-covered services.      Patient would like the video invitation sent by:  My Chart     Mode of Communication:  Video Conference via Medical Zoom     As the provider I attest to compliance with applicable laws and regulations related to telemedicine.    [facility] Adult Mental Health Day Treatment  TRACK: 6B    NUMBER OF PARTICIPANTS: 3    Summary of Group / Topics Discussed:  Resiliency Development:  Coping Skills (Mental Health Check In): Patients were taught how to identify stressors, signs of stress, coping skills, and prevention strategies for overall stress management.  Patients were given the opportunity to identify both ongoing and acute mental health symptoms and how to effectively manage these symptoms by developing an effective aftercare plan.  Patients increased awareness of community based resources.    Patient Session Goals / Objectives:  Identified how using coping skills can be used for symptom and stress management     Improved awareness of individualed  symptoms and stressors and how to effectively cope   Established a relapse prevention plan to practice these skills in their own environments  Practiced and reflected on how to generalize taught skills to their everyday life        Patient Participation / Response:  {[facility] PROGRESS NOTE PATIENT PARTICIPATION:723320}    {[facility] PROGRESS NOTE PATIENT RESPONSE - LIFE SKILLS :782307}    Treatment Plan:  Patient has {[facility] PROGRAMMATIC TX PLAN STATUS:900115}    Negra Gonzales, [facility] HospitalSW

## 2022-11-10 NOTE — GROUP NOTE
Psychoeducation Group Note                                    Service Modality:  Video Visit     Telemedicine Visit: The patient's condition can be safely assessed and treated via synchronous audio and visual telemedicine encounter.      Reason for Telemedicine Visit: Patient has requested telehealth visit, Patient unable to travel, Patient convenience (e.g. access to timely appointments / distance to available provider), Patient lives in a designated Health Professional Shortage Area (HPSA), and Services only offered telehealth    Originating Site (Patient Location): Patient's home    Distant Site (Provider Location): [facility]: [facility], [facility]    Consent:  The patient/guardian has verbally consented to: the potential risks and benefits of telemedicine (video visit) versus in person care; bill my insurance or make self-payment for services provided; and responsibility for payment of non-covered services.     Patient would like the video invitation sent by:  My Chart    Mode of Communication:  Video Conference via Medical Zoom    As the provider I attest to compliance with applicable laws and regulations related to telemedicine.        PATIENT'S NAME: Ayana Prasad  MRN:   [id number]  :   1990  ACCT. NUMBER: [id number]  DATE OF SERVICE: 11/10/22  START TIME:  2:00 PM  END TIME:  2:50 PM  FACILITATOR: Kacey Tena PsyD; Kristin Gongora RN  TOPIC:  Wellness Group: Mind/Body Practice & Complementary  [facility] Adult Mental Health Day Treatment  TRACK: 6B    NUMBER OF PARTICIPANTS: 4    Summary of Group / Topics Discussed:  Mind/Body Practice & Complementary Therapies:  Progressive Muscle Relaxation: In addition to affecting our mood and behavior, psychological stress can cause a myriad of physical symptoms in our body. Patients were educated on these effects and guided to increased self-awareness of how stress affects their body. The purpose, benefits, history, and  techniques of progressive muscle relaxation were discussed. In an instructor guided experiential, patients were guided to practice PMR to help reduce physical symptoms of psychological stress and achieve a more balanced feeling of well-being.    Patient Session Goals / Objectives:  ? Identified physiological symptoms of stress on the body  ? Listed & Explained the purpose and benefits to practicing PMR   ? Practiced progressive muscle relaxation experiential      Patient Participation / Response:  Fully participated with the group by sharing personal reflections / insights and openly received / provided feedback with other participants.    Identified / Expressed personal readiness to practice skills  Prakash reported problems with chronic pain and talked to the group about different ways to resolve the pain with hot and cold packs and some medications.  Prakash reported ongoing auditory hallucinations where the voices make demands upon him and he can't ignore them or they get louder. He stated that headphones sometimes help.    Treatment Plan:  Patient has a current master individualized treatment plan.  See Epic treatment plan for more information.    Amanda Meléndez Psy., D,  Licensed Clinical Psychologist

## 2022-11-10 NOTE — TELEPHONE ENCOUNTER
Phone call to Prakash to coordinate care for treatment plan and goals.    John Ling, D,  Licensed Clinical Psychologist

## 2022-11-14 ENCOUNTER — TELEPHONE (OUTPATIENT)
Dept: PLASTIC SURGERY | Facility: CLINIC | Age: 32
End: 2022-11-14

## 2022-11-14 ENCOUNTER — HOSPITAL ENCOUNTER (OUTPATIENT)
Dept: BEHAVIORAL HEALTH | Facility: CLINIC | Age: 32
Discharge: HOME | End: 2022-11-14
Attending: PSYCHIATRY & NEUROLOGY
Payer: COMMERCIAL

## 2022-11-14 PROCEDURE — 90853 GROUP PSYCHOTHERAPY: CPT | Mod: GT,95 | Performed by: SOCIAL WORKER

## 2022-11-14 PROCEDURE — 90853 GROUP PSYCHOTHERAPY: CPT | Mod: GT,95 | Performed by: PSYCHOLOGIST

## 2022-11-14 NOTE — GROUP NOTE
Psychoeducation Group Note    PATIENT'S NAME: Ayana Prasad  MRN:   [id number]  :   1990  ACCT. NUMBER: [id number]  DATE OF SERVICE: 22  START TIME:  3:00 PM  END TIME:  3:50 PM  FACILITATOR: Negra Gonzales LICSW; Lorenzo Haynes, OTR/L  TOPIC: MH Life Skills Group: Communication and Social Skills Development  Service Modality:  Video Visit     Telemedicine Visit: The patient's condition can be safely assessed and treated via synchronous audio and visual telemedicine encounter.       Reason for Telemedicine Visit: Services only offered telehealth and due to COVID-19.     Originating Site (Patient Location): Patient's home     Distant Site (Provider Location): Provider Remote Setting- Home Office     Consent:  The patient/guardian has verbally consented to: the potential risks and benefits of telemedicine (video visit) versus in person care; bill my insurance or make self-payment for services provided; and responsibility for payment of non-covered services.      Patient would like the video invitation sent by:  My Chart     Mode of Communication:  Video Conference via Medical Zoom     As the provider I attest to compliance with applicable laws and regulations related to telemedicine.    [facility] Adult Mental Health Day Treatment  TRACK: 6B    NUMBER OF PARTICIPANTS: 3    Summary of Group / Topics Discussed:  Communication and Social Skills Development: Communication Styles: Conflict Life Corona (Personal Conflicts): Patients discussed and were taught about different corona of conflict in life and how it impacts or is impacted by mental health symptoms.  Patients gained awareness of personal conflict corona.  Patients were taught how to identify and take active steps to resolve these conflicts.      Patient Session Goals / Objectives:    Identified personal conflict corona and how these impact or are impacted by mental health symptoms        Improved awareness of important life corona  "were conflict is being experienced        Established a plan for practice of these skills in their own environments    Practiced and reflected on how to generalize taught skills to their everyday life    Education through video, \"A New Way to Think About Schizophrenia\" with Galina Junior      Patient Participation / Response:  Fully participated with the group by sharing personal reflections / insights and openly received / provided feedback with other participants.    Verbalized understanding of content    Treatment Plan:  Patient has a current master individualized treatment plan.  See Epic treatment plan for more information.    Negra Gonzales, LICSW      "

## 2022-11-14 NOTE — TELEPHONE ENCOUNTER
Called Randall to verify that he's off nicotine. He plans to stop tomorrow. This writer told him he'd let the  know to reach out for a date for surgery.    For after care, Randall stated the owner of his group home is a nurse. Randall plans to ask this person (Domenica) if she can help with aftercare or get an in home provider. Randall stated he's gotten in home care before and it is covered.     Prakash confirmed he has a ride home.

## 2022-11-14 NOTE — GROUP NOTE
Process Group Note                                    Service Modality:  Video Visit     Telemedicine Visit: The patient's condition can be safely assessed and treated via synchronous audio and visual telemedicine encounter.      Reason for Telemedicine Visit: Patient has requested telehealth visit, Patient unable to travel, Patient convenience (e.g. access to timely appointments / distance to available provider), Patient lives in a designated Health Professional Shortage Area (HPSA), and Services only offered telehealth    Originating Site (Patient Location): Patient's home    Distant Site (Provider Location): [facility]: [facility], [facility]    Consent:  The patient/guardian has verbally consented to: the potential risks and benefits of telemedicine (video visit) versus in person care; bill my insurance or make self-payment for services provided; and responsibility for payment of non-covered services.     Patient would like the video invitation sent by:  My Chart    Mode of Communication:  Video Conference via Medical Zoom    As the provider I attest to compliance with applicable laws and regulations related to telemedicine.        PATIENT'S NAME: Ayana Prasad  MRN:   [id number]  :   1990  ACCT. NUMBER: [id number]  DATE OF SERVICE: 22  START TIME:  1:00 PM  END TIME:  1:50 PM  FACILITATOR: Kacey Tena PsyD  TOPIC:  Process Group    Diagnoses:  Summary: Patient is a 32 year old transgender male with a mental health history ADHD, PTSD, anxiety and schizoaffective disorder. Patient did not provide any other mental health diagnosis, but medical records state: .      295.70  (F25) Schizoaffective Disorder Bipolar Type.  300.02 (F41.1) Generalized Anxiety Disorder  309.81 (F43.10) Posttraumatic Stress Disorder (includes Posttraumatic Stress Disorder for Children 6 Years and Younger)  Without dissociative symptoms.  314.01 (F90.9) Unspecified Attention -Deficit / Hyperactivity  "Disorder         ADHD (attention deficit hyperactivity disorder)       Bipolar 1 disorder       Borderline personality disorder       Cauda equina syndrome       Chronic low back pain       Depression       GERD (gastroesophageal reflux disease)       h/o TBI (traumatic brain injury)       Hypertension, unspecified type 12/16/2021     Marginal corneal ulcer, left 07/17/2015     Nephrolithiasis       obesity       Polysubstance abuse - methamphetamine, hallucinagen, heroin, marijuana       currently in remission     PONV (postoperative nausea and vomiting)       PTSD (post-traumatic stress disorder)        Psychiatry: Murali Gonzalez, CNP, [facility]  Therapy: Deborah Rosas, [facility]  :  Mental Health Resources: [facility] [place]: [phone]  Fax: [phone]  [place] Primary Care Provider: Dr. Becki Avery      [facility] Adult Mental Health Day Treatment  TRACK: 6B    NUMBER OF PARTICIPANTS: 3          Data:    Session content: At the start of this group, patients were invited to check in by identifying themselves, describing their current emotional status, and identifying issues to address in this group.   Area(s) of treatment focus addressed in this session included Symptom Management, Personal Safety and Community Resources/Discharge Planning.  Client reported being safe today.  Reported mood is \"ok.\"    Goal for today is to attend group therapy online and talk with others. The client talked to the group about how he distracted himself with watching movies and netflick shows, and the [place] with a roommate. He reported that there was not problems with psychosis over the weekend and mood seemed stable. He stated that he stayed inside the apartment, stayed warm, and \"hung-out\" with his roommate.  He stated that he enjoyed the movies and talked to the group about their themes. He stated that he enjoyed the suspenseful movies.  He reported that he used distraction to help lower " other symptoms. He reported that he managed chronic pain in his back with a heating pad and that sleep was good. He reported that he uses medical marijuana to manage pain.      Therapeutic Interventions/Treatment Strategies:  Psychotherapist reinforced use of skills. Treatment modalities used include Cognitive Behavioral Therapy and Dialectical Behavioral Therapy. Interventions include Coping Skills: Discussed meditation skills and addressed ways to implement meditation skills , Relapse Prevention: Coached on skills to manage factors that contribute to relapse, Mindfulness: Facilitated discussion of when/how to use mindfulness skills to benefit general health, mental health symptoms, and stressors and Symptoms Management: Promoted understanding of their diagnoses and how it impacts their functioning.    Assessment:    Patient response:   Patient responded to session by listening, focusing on goals, being attentive and appearing alert    Possible barriers to participation / learning include: severity of symptoms    Health Issues:   Yes: Pain, Associated Psychological Distress       Substance Use Review:   Substance Use: No active concerns identified.    Mental Status/Behavioral Observations  Appearance:   Appropriate   Eye Contact:   Good   Psychomotor Behavior: Normal   Attitude:   Cooperative   Orientation:   All  Speech   Rate / Production: Normal    Volume:  Normal   Mood:    Anxious  Depressed   Affect:    Constricted   Thought Content:   Rumination and Psychosis Psychosis symptoms have  decreased, less voices and visual hallucinations.  Thought Form:  Coherent  Logical     Insight:    Good     Plan:     Safety Plan: Recommended that patient call 911 or go to the local ED should there be a change in any of these risk factors.     Barriers to treatment: None identified    Patient Contracts (see media tab):  None    Substance Use: Provided encouragement towards sobriety     Continue or Discharge: Patient will  continue in Adult Day Treatment (ADT)  as planned. Patient is likely to benefit from learning and using skills as they work toward the goals identified in their treatment plan.      Kacey Tena PsyD  November 14, 2022  John Ling, ROSALINA,  Licensed Clinical Psychologist

## 2022-11-14 NOTE — GROUP NOTE
Psychoeducation Group Note                                    Service Modality:  Video Visit     Telemedicine Visit: The patient's condition can be safely assessed and treated via synchronous audio and visual telemedicine encounter.      Reason for Telemedicine Visit: Patient has requested telehealth visit, Patient unable to travel, Patient convenience (e.g. access to timely appointments / distance to available provider), Patient lives in a designated Health Professional Shortage Area (HPSA), and Services only offered telehealth    Originating Site (Patient Location): Patient's home    Distant Site (Provider Location): [facility]: [facility], [facility]    Consent:  The patient/guardian has verbally consented to: the potential risks and benefits of telemedicine (video visit) versus in person care; bill my insurance or make self-payment for services provided; and responsibility for payment of non-covered services.     Patient would like the video invitation sent by:  My Chart    Mode of Communication:  Video Conference via Medical Zoom    As the provider I attest to compliance with applicable laws and regulations related to telemedicine.        PATIENT'S NAME: Ayana Prasad  MRN:   [id number]  :   1990  ACCT. NUMBER: [id number]  DATE OF SERVICE: 22  START TIME:  2:00 PM  END TIME:  2:50 PM  FACILITATOR: Kacey Tena PsyD; Kristin Gongora RN  TOPIC:  Wellness Group: Health Maintenance  [facility] Adult Mental Health Day Treatment  TRACK: 6B    NUMBER OF PARTICIPANTS: 3    Summary of Group / Topics Discussed:  Health Maintenance: Goal Setting: Meaningful goals can bring a sense of direction and purpose in life.  They also highlight our most important values. Patients were assisted by instructor to identify short term goals to promote their mental health recovery and improve overall health and wellness. Patients were educated on SMART goal setting framework as a strategy to  increase outcomes and promote success.    Patient Session Goals / Objectives:  ? Explained the key concepts of SMART goal setting framework  ? Identified three goals successfully using SMART goal setting framework  ? Reviewed concept of balance in wellness as it pertains to goal setting        Patient Participation / Response:  Fully participated with the group by sharing personal reflections / insights and openly received / provided feedback with other participants.    Demonstrated understanding of topics discussed through group discussion and participation  Prakash talked to the group about an upcoming psychiatry appointment and trying to get on a different anti-psychotic medication. The group discussed alternatives.  Prakash reported that he will talk about ADHD medications.  Treatment Plan:  Patient has a current master individualized treatment plan.  See Epic treatment plan for more information.    Amanda Meléndez Psy., D,  Licensed Clinical Psychologist

## 2022-11-15 ENCOUNTER — VIRTUAL VISIT (OUTPATIENT)
Dept: PSYCHIATRY | Facility: CLINIC | Age: 32
End: 2022-11-15
Attending: NURSE PRACTITIONER
Payer: COMMERCIAL

## 2022-11-15 DIAGNOSIS — F41.9 ANXIETY: ICD-10-CM

## 2022-11-15 DIAGNOSIS — R25.1 TREMOR: ICD-10-CM

## 2022-11-15 DIAGNOSIS — F90.2 ATTENTION DEFICIT HYPERACTIVITY DISORDER (ADHD), COMBINED TYPE: ICD-10-CM

## 2022-11-15 DIAGNOSIS — F51.05 INSOMNIA DUE TO OTHER MENTAL DISORDER: ICD-10-CM

## 2022-11-15 DIAGNOSIS — F25.0 SCHIZOAFFECTIVE DISORDER, BIPOLAR TYPE (H): Primary | ICD-10-CM

## 2022-11-15 DIAGNOSIS — F99 INSOMNIA DUE TO OTHER MENTAL DISORDER: ICD-10-CM

## 2022-11-15 PROCEDURE — 99214 OFFICE O/P EST MOD 30 MIN: CPT | Mod: 95 | Performed by: NURSE PRACTITIONER

## 2022-11-15 RX ORDER — OLANZAPINE 20 MG/1
20 TABLET ORAL AT BEDTIME
Qty: 30 TABLET | Refills: 0 | Status: SHIPPED | OUTPATIENT
Start: 2022-11-15 | End: 2022-12-14

## 2022-11-15 RX ORDER — DEXTROAMPHETAMINE SACCHARATE, AMPHETAMINE ASPARTATE MONOHYDRATE, DEXTROAMPHETAMINE SULFATE AND AMPHETAMINE SULFATE 1.25; 1.25; 1.25; 1.25 MG/1; MG/1; MG/1; MG/1
5 CAPSULE, EXTENDED RELEASE ORAL DAILY
Qty: 30 CAPSULE | Refills: 0 | Status: SHIPPED | OUTPATIENT
Start: 2022-11-18 | End: 2022-12-14 | Stop reason: DRUGHIGH

## 2022-11-15 RX ORDER — PROPRANOLOL HYDROCHLORIDE 10 MG/1
TABLET ORAL
Qty: 30 TABLET | Refills: 0 | OUTPATIENT
Start: 2022-11-15

## 2022-11-15 RX ORDER — PROPRANOLOL HYDROCHLORIDE 10 MG/1
10 TABLET ORAL 2 TIMES DAILY PRN
Qty: 60 TABLET | Refills: 1 | Status: SHIPPED | OUTPATIENT
Start: 2022-11-15 | End: 2023-01-12

## 2022-11-15 RX ORDER — LITHIUM CARBONATE 300 MG/1
900 TABLET, FILM COATED, EXTENDED RELEASE ORAL AT BEDTIME
Qty: 90 TABLET | Refills: 2 | Status: SHIPPED | OUTPATIENT
Start: 2022-11-15 | End: 2022-12-19

## 2022-11-15 ASSESSMENT — PATIENT HEALTH QUESTIONNAIRE - PHQ9
10. IF YOU CHECKED OFF ANY PROBLEMS, HOW DIFFICULT HAVE THESE PROBLEMS MADE IT FOR YOU TO DO YOUR WORK, TAKE CARE OF THINGS AT HOME, OR GET ALONG WITH OTHER PEOPLE: NOT DIFFICULT AT ALL
SUM OF ALL RESPONSES TO PHQ QUESTIONS 1-9: 2
SUM OF ALL RESPONSES TO PHQ QUESTIONS 1-9: 2

## 2022-11-15 NOTE — PATIENT INSTRUCTIONS
-Start Rexulti 1 mg daily for mood and psychosis.  -Continue all other medication regimen for now.    -Complete EKG after 1 months of Rexulti trial, but before next appt.    Your next appointment is scheduled on 12/14/2022 (Wed) at 1:30pm.    Thank you for coming to the [facility].    Lab Testing:  If you had lab testing today and your results are reassuring or normal they will be mailed to you or sent through [facility] within 7 days. If the lab tests need quick action we will call you with the results. The phone number we will call with results is # [phone] (home) . If this is not the best number please call our clinic and change the number.    Medication Refills:  If you need any refills please call your pharmacy and they will contact us. Our fax number for refills is [phone]. Please allow three business for refill processing. If you need to  your refill at a new pharmacy, please contact the new pharmacy directly. The new pharmacy will help you get your medications transferred.     Scheduling:  If you have any concerns about today's visit or wish to schedule another appointment please call our office during normal business hours [phone] (8-5:00 M-F)    Contact Us:  Please call [phone] during business hours (8-5:00 M-F).  If after clinic hours, or on the weekend, please call  [phone].    Financial Assistance [phone]  [facility] Billing [phone]  Central Billing Office, [facility]: [phone]  [facility]illing [phone]  Medical Records [phone]      MENTAL HEALTH CRISIS NUMBERS:  For a medical emergency please call  911 or go to the nearest ER.     [facility]:   [facility] -[phone]   Crisis Residence [facility] Residence -[phone]   Walk-In Counseling Center [facility] -[phone]   COPE 24/7 [place] Mobile Team -[phone] (adults)/[phone] (child)  CHILD: [facility] needs  assessment team - [phone]      [facility]:   [facility] - [phone]   Walk-in counseling [facility] - [phone]   Walk-in counseling [facility] - [phone]   Crisis Residence [facility]] [facility] - [phone]  Urgent Care Adult Mental [facility]-[phone] mobile unit/ 24/7 crisis line    National Crisis Numbers:   National Suicide Prevention Lifeline: 6-984-672-TALK (524-550-8782)  Poison Control Center - 9-185-737-8057  TipRanks/resources for a list of additional resources (SOS)  Trans Lifeline a hotline for transgender people 9-056-469-2998  The [employer] Project a hotline for LGBT youth 4-573-911-7011  Crisis Text Line: For any crisis 24/7   To: 690470  see www.crisistextline.org  - IF MAKING A CALL FEELS TOO HARD, send a text!         Again thank you for choosing [facility] MENTAL HEALTH & ADDICTION [facility] and please let us know how we can best partner with you to improve you and your family's health.    You may be receiving a survey regarding this appointment. We would love to have your feedback, both positive and negative. The survey is done by an external company, so your answers are anonymous.
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
## 2022-11-16 ENCOUNTER — TELEPHONE (OUTPATIENT)
Dept: PLASTIC SURGERY | Facility: CLINIC | Age: 32
End: 2022-11-16

## 2022-11-16 NOTE — TELEPHONE ENCOUNTER
Pt called this RN back and stated that he quit smoking 11/15/22. Pt says it's been hard but he is motivated to quit so he can have top surgery. Discussed with pt why smoking cessation is a requirement one month before and after surgery. Pt shared that this information is helpful for him to think about when it's difficult to avoid smoking. Per Dr Mayers, pt may schedule surgery after quitting smoking. Contacted surgery scheduler to share that pt is ready to schedule surgery. Pt understands that this RN will follow up with him after surgery is scheduled and periodically leading up to surgery to check on nicotine cessation progress. Pt verbalized an understanding and shared that this would be helpful.    Carlos Ríos, RN

## 2022-11-16 NOTE — TELEPHONE ENCOUNTER
Pt is ready to schedule surgery with Dr Mayers. However, per Dr Mayers, pt needs to have quit using nicotine before scheduling. Pt has spoken with [facility]  [facility]uck and said he was planning to quit smoking on 11/15/22. Called pt to check in on his nicotine cessation progress. Unable to reach, left voicemail with reason for calling and provided a return call.    Carlos Ríos RN

## 2022-11-17 ENCOUNTER — HOSPITAL ENCOUNTER (OUTPATIENT)
Dept: BEHAVIORAL HEALTH | Facility: CLINIC | Age: 32
Discharge: HOME | End: 2022-11-17
Attending: PSYCHIATRY & NEUROLOGY
Payer: COMMERCIAL

## 2022-11-17 PROCEDURE — 90853 GROUP PSYCHOTHERAPY: CPT | Mod: GT,95 | Performed by: SOCIAL WORKER

## 2022-11-17 PROCEDURE — 90853 GROUP PSYCHOTHERAPY: CPT | Mod: GT,95 | Performed by: PSYCHOLOGIST

## 2022-11-17 NOTE — GROUP NOTE
Process Group Note                                    Service Modality:  Video Visit     Telemedicine Visit: The patient's condition can be safely assessed and treated via synchronous audio and visual telemedicine encounter.      Reason for Telemedicine Visit: Patient has requested telehealth visit, Patient unable to travel, Patient convenience (e.g. access to timely appointments / distance to available provider), Patient lives in a designated Health Professional Shortage Area (HPSA), and Services only offered telehealth    Originating Site (Patient Location): Patient's home    Distant Site (Provider Location): [facility]: [facility], [facility]    Consent:  The patient/guardian has verbally consented to: the potential risks and benefits of telemedicine (video visit) versus in person care; bill my insurance or make self-payment for services provided; and responsibility for payment of non-covered services.     Patient would like the video invitation sent by:  My Chart    Mode of Communication:  Video Conference via Medical Zoom    As the provider I attest to compliance with applicable laws and regulations related to telemedicine.        PATIENT'S NAME: Ayana Prasad  MRN:   [id number]  :   1990  ACCT. NUMBER: [id number]  DATE OF SERVICE: 22  START TIME:  1:00 PM  END TIME:  1:50 PM  FACILITATOR: Kacey Tena PsyD  TOPIC:  Process Group    Diagnoses:  Summary: Patient is a 32 year old transgender male with a mental health history ADHD, PTSD, anxiety and schizoaffective disorder. Patient did not provide any other mental health diagnosis, but medical records state: .      295.70  (F25) Schizoaffective Disorder Bipolar Type.  300.02 (F41.1) Generalized Anxiety Disorder  309.81 (F43.10) Posttraumatic Stress Disorder (includes Posttraumatic Stress Disorder for Children 6 Years and Younger)  Without dissociative symptoms.  314.01 (F90.9) Unspecified Attention -Deficit / Hyperactivity  Disorder         ADHD (attention deficit hyperactivity disorder)       Bipolar 1 disorder       Borderline personality disorder       Cauda equina syndrome       Chronic low back pain       Depression       GERD (gastroesophageal reflux disease)       h/o TBI (traumatic brain injury)       Hypertension, unspecified type 12/16/2021     Marginal corneal ulcer, left 07/17/2015     Nephrolithiasis       obesity       Polysubstance abuse - methamphetamine, hallucinagen, heroin, marijuana       currently in remission     PONV (postoperative nausea and vomiting)       PTSD (post-traumatic stress disorder)        Psychiatry: Murali Gonzalez, CNP, [employer]  Therapy: Deborah Rosas, [facility]  :  Mental Health Resources: [place]: [phone]  Fax: [phone]  [place] Primary Care Provider: Dr. Becki Avery      [facility] Adult Mental Health Day Treatment  TRACK: 6B    NUMBER OF PARTICIPANTS: 6          Data:    Session content: At the start of this group, patients were invited to check in by identifying themselves, describing their current emotional status, and identifying issues to address in this group.   Area(s) of treatment focus addressed in this session included Symptom Management, Personal Safety and Community Resources/Discharge Planning.  Client reported being safe today.  Reported mood is ok.    Goal for today is to attend group therapy online and talk with others.  The client talked to the group about how he will get an end to the Civil Commitment in 2/2023 and was happy about it.  He reported feeling supported by others and feeling more stabilized. He stated that he talked to his psychiatrist and started Rexulti and feels good.  He stated that he did some coloring to relax and did mindfulness.  He stated that his Adderall was increased and feels that concentration has improved. He stated that psychosis symptoms were better.       Therapeutic Interventions/Treatment  Strategies:  Psychotherapist reinforced use of skills. Treatment modalities used include Cognitive Behavioral Therapy and Dialectical Behavioral Therapy. Interventions include Coping Skills: Discussed meditation skills and addressed ways to implement meditation skills , Relapse Prevention: Assisted patient in identifying personal vulnerabilities, thoughts, emotions, and situations that may lead to relapse , Mindfulness: Facilitated discussion of when/how to use mindfulness skills to benefit general health, mental health symptoms, and stressors and Symptoms Management: Promoted understanding of their diagnoses and how it impacts their functioning.    Assessment:    Patient response:   Patient responded to session by giving feedback, listening and being attentive    Possible barriers to participation / learning include: severity of symptoms    Health Issues:   Chronic pain       Substance Use Review:   Substance Use: No active concerns identified.    Mental Status/Behavioral Observations  Appearance:   Appropriate   Eye Contact:   Good   Psychomotor Behavior: Normal   Attitude:   Cooperative   Orientation:   All  Speech   Rate / Production: Normal    Volume:  Normal   Mood:    Anxious  Depressed   Affect:    Constricted   Thought Content:   Rumination and Psychosis Psychosis symptoms have  decreased,   Thought Form:  Coherent  Logical     Insight:    Good     Plan:     Safety Plan: Recommended that patient call 911 or go to the local ED should there be a change in any of these risk factors.     Barriers to treatment: None identified    Patient Contracts (see media tab):  None    Substance Use: Provided encouragement towards sobriety     Continue or Discharge: Patient will continue in Adult Day Treatment (ADT)  as planned. Patient is likely to benefit from learning and using skills as they work toward the goals identified in their treatment plan.      Kacey Tena PsyD  November 17, 2022  Lisa Ling., D,   Licensed Clinical Psychologist

## 2022-11-17 NOTE — GROUP NOTE
Psychoeducation Group Note                                    Service Modality:  Video Visit     Telemedicine Visit: The patient's condition can be safely assessed and treated via synchronous audio and visual telemedicine encounter.      Reason for Telemedicine Visit: Patient has requested telehealth visit, Patient unable to travel, Patient convenience (e.g. access to timely appointments / distance to available provider), Patient lives in a designated Health Professional Shortage Area (HPSA), and Services only offered telehealth    Originating Site (Patient Location): Patient's home    Distant Site (Provider Location): [facility]: [facility], [facility]    Consent:  The patient/guardian has verbally consented to: the potential risks and benefits of telemedicine (video visit) versus in person care; bill my insurance or make self-payment for services provided; and responsibility for payment of non-covered services.     Patient would like the video invitation sent by:  My Chart    Mode of Communication:  Video Conference via Medical Zoom    As the provider I attest to compliance with applicable laws and regulations related to telemedicine.        PATIENT'S NAME: Ayana Prasad  MRN:   [id number]  :   1990  ACCT. NUMBER: [id number]  DATE OF SERVICE: 22  START TIME:  2:00 PM  END TIME:  2:50 PM  FACILITATOR: Kacey Tena PsyD; Kristin Gongora RN  TOPIC:  Wellness Group: Medication Education and Management  [facility] Adult Mental Health Day Treatment  TRACK: 6B    NUMBER OF PARTICIPANTS: 6    Summary of Group / Topics Discussed:  Medication Educations and Management:  Medication Jeopardy: Patients provided education regarding medication safety, antidepressants, side effects, neuroleptics, expected medication outcomes, knowledge of diagnosis, symptoms, and symptom management through an engaging jeopardy-style format.     Patient Session Goals / Objectives:    ? Participated in  team-based Jeopardy game  ? Identified strategies for safe use, handling, and disposal of medications  ? Discussed basic aspects of medication safety, side effects, adverse outcomes and contraindications      Patient Participation / Response:  Fully participated with the group by sharing personal reflections / insights and openly received / provided feedback with other participants.     Identified / Expressed personal readiness to practice skills     Prakash talked with others about anti-psychotic medications.    Treatment Plan:  Patient has a current master individualized treatment plan.  See Epic treatment plan for more information.    Amanda Meléndez Psy., D,  Licensed Clinical Psychologist

## 2022-11-18 NOTE — GROUP NOTE
Psychoeducation Group Note    PATIENT'S NAME: Ayana Prasad  MRN:   [id number]  :   1990  ACCT. NUMBER: [id number]  DATE OF SERVICE: 22  START TIME:  3:00 PM  END TIME:  3:50 PM  FACILITATOR: Negra Gonzales LICSW; Lorenzo Haynes, OTR/L  TOPIC: MH Life Skills Group: Resiliency Development  Service Modality:  Video Visit     Telemedicine Visit: The patient's condition can be safely assessed and treated via synchronous audio and visual telemedicine encounter.       Reason for Telemedicine Visit: Services only offered telehealth and due to COVID-19.     Originating Site (Patient Location): Patient's home     Distant Site (Provider Location): Provider Remote Setting- Home Office     Consent:  The patient/guardian has verbally consented to: the potential risks and benefits of telemedicine (video visit) versus in person care; bill my insurance or make self-payment for services provided; and responsibility for payment of non-covered services.      Patient would like the video invitation sent by:  My Chart     Mode of Communication:  Video Conference via Medical Zoom     As the provider I attest to compliance with applicable laws and regulations related to telemedicine.    [facility] Adult Mental Health Day Treatment  TRACK: 6B    NUMBER OF PARTICIPANTS: 6    Summary of Group / Topics Discussed:  Resiliency Development:  Coping Skills (PROM) : Patients were taught how to identify stressors, signs of stress, coping skills, and prevention strategies for overall stress management.  Patients were given the opportunity to identify both ongoing and acute mental health symptoms and how to effectively manage these symptoms by developing an effective aftercare plan.  Patients increased awareness of community based resources.    Patient Session Goals / Objectives:    Identified how using coping skills can be used for symptom and stress management       Improved awareness of individualed symptoms and  stressors and how to effectively cope     Established a relapse prevention plan to practice these skills in their own environments    Practiced and reflected on how to generalize taught skills to their everyday life    Completed and discussed the PROM.        Patient Participation / Response:  Fully participated with the group by sharing personal reflections / insights and openly received / provided feedback with other participants.    Verbalized understanding of content    Treatment Plan:  Patient has a current master individualized treatment plan.  See Epic treatment plan for more information.    Negra Gonzales, BOBSW

## 2022-11-21 ENCOUNTER — TELEPHONE (OUTPATIENT)
Dept: BEHAVIORAL HEALTH | Facility: CLINIC | Age: 32
End: 2022-11-21

## 2022-11-21 NOTE — TELEPHONE ENCOUNTER
Called and left VM regarding attending group today. Requested call back or to join group if able.  Kristin Gongora RN on 11/21/2022 at 1:37 PM

## 2022-11-22 ENCOUNTER — TELEPHONE (OUTPATIENT)
Dept: BEHAVIORAL HEALTH | Facility: CLINIC | Age: 32
End: 2022-11-22

## 2022-11-22 NOTE — TELEPHONE ENCOUNTER
Phone call from Prakash, who is sick and   Has the flu    John Ling, D,  Licensed Clinical Psychologist

## 2022-11-28 ENCOUNTER — HOSPITAL ENCOUNTER (OUTPATIENT)
Dept: BEHAVIORAL HEALTH | Facility: CLINIC | Age: 32
Discharge: HOME | End: 2022-11-28
Attending: PSYCHIATRY & NEUROLOGY
Payer: COMMERCIAL

## 2022-11-28 ENCOUNTER — TELEPHONE (OUTPATIENT)
Dept: PLASTIC SURGERY | Facility: CLINIC | Age: 32
End: 2022-11-28

## 2022-11-28 PROCEDURE — 90853 GROUP PSYCHOTHERAPY: CPT | Mod: GT,95 | Performed by: PSYCHOLOGIST

## 2022-11-28 PROCEDURE — 90853 GROUP PSYCHOTHERAPY: CPT | Mod: GT,95 | Performed by: SOCIAL WORKER

## 2022-11-28 NOTE — GROUP NOTE
Psychoeducation Group Note    PATIENT'S NAME: Ayana Prasad  MRN:   [id number]  :   1990  ACCT. NUMBER: [id number]  DATE OF SERVICE: 22  START TIME:  3:00 PM  END TIME:  3:50 PM  FACILITATOR: Kacey Tena PsyD Hermann, Jessica, RN  TOPIC: MH Wellness Group: Mental Health Maintenance  [facility] Adult Mental Health Day Treatment  TRACK: 6B    NUMBER OF PARTICIPANTS: 5    Summary of Group / Topics Discussed:  Mental Health Maintenance:  Assessments of Strengths: Patients completed a self-reflection on personal strengths worksheet. The concept of personal strength as it relates to resilience were explored. Patients shared responses with the group and participated in discussion.     Patient Session Goals / Objectives:  ? Patients identified 1-3 qualities they consider a personal strength.  ? Patients understood the concept of personal strengths and the connection it has to resiliency        Patient Participation / Response:  Fully participated with the group by sharing personal reflections / insights and openly received / provided feedback with other participants.    Identified / Expressed personal readiness to practice skills and Verbalized understanding of mental health maintenance topic  Prakash reported feeling tired from the flu and talked with others about the flu shot and wearing a mask. Prakash reported that he felt more stable today.  He reported that sleep is off and talked about starting Rexalti.    Treatment Plan:  Patient has a current master individualized treatment plan.  See Epic treatment plan for more information.    Amanda Meléndez Psy., D,  Licensed Clinical Psychologist

## 2022-11-28 NOTE — GROUP NOTE
Psychoeducation Group Note    PATIENT'S NAME: Ayana Prasad  MRN:   [id number]  :   1990  ACCT. NUMBER: [id number]  DATE OF SERVICE: 22  START TIME:  2:00 PM  END TIME:  2:50 PM  FACILITATOR: Negra Gonzales LICSW; Lorenzo Haynes OTR/L  TOPIC: MH Life Skills Group: Communication and Social Skills Development  Service Modality:  Video Visit     Telemedicine Visit: The patient's condition can be safely assessed and treated via synchronous audio and visual telemedicine encounter.       Reason for Telemedicine Visit: Services only offered telehealth and due to COVID-19.     Originating Site (Patient Location): Patient's home     Distant Site (Provider Location): Provider Remote Setting- Home Office     Consent:  The patient/guardian has verbally consented to: the potential risks and benefits of telemedicine (video visit) versus in person care; bill my insurance or make self-payment for services provided; and responsibility for payment of non-covered services.      Patient would like the video invitation sent by:  My Chart     Mode of Communication:  Video Conference via Medical Zoom     As the provider I attest to compliance with applicable laws and regulations related to telemedicine.    Service Modality:  Video Visit     Telemedicine Visit: The patient's condition can be safely assessed and treated via synchronous audio and visual telemedicine encounter.       Reason for Telemedicine Visit: Services only offered telehealth and due to COVID-19.     Originating Site (Patient Location): Patient's home     Distant Site (Provider Location): Provider Remote Setting- Home Office     Consent:  The patient/guardian has verbally consented to: the potential risks and benefits of telemedicine (video visit) versus in person care; bill my insurance or make self-payment for services provided; and responsibility for payment of non-covered services.      Patient would like the video invitation sent by:  My  Chart     Mode of Communication:  Video Conference via Medical Zoom     As the provider I attest to compliance with applicable laws and regulations related to telemedicine.    [facility] Mental Health Day Treatment  TRACK: 6B    NUMBER OF PARTICIPANTS: 6    Summary of Group / Topics Discussed:  Communication and Social Skills Development: Social Supports: Social Risk Taking: Patients explored and evaluated how effective they are in different aspects of social risk taking.  Patients gained awareness of different areas in which they need/want to take risks, possible benefits of taking this risk, and action steps to take.  Patients identified both personal strengths and opportunities for growth in social risk taking to improve overall communication and connection with other people.      Patient Session Goals / Objectives:    Identified strengths and opportunities for growth in social risk taking skills and how these impact their ability to communicate clearly with other people       Improved awareness of important aspects of social risk taking skills and how this relates to mental health recovery        Established a plan for practice of these skills in their own environments    Practiced and reflected on how to generalize taught skills to their everyday life      Patient Participation / Response:  Fully participated with the group by sharing personal reflections / insights and openly received / provided feedback with other participants.    Verbalized understanding of content    Treatment Plan:  Patient has a current master individualized treatment plan.  See Epic treatment plan for more information.    Negra Gonzales, [facility] CenterSW

## 2022-11-28 NOTE — GROUP NOTE
Process Group Note                                    Service Modality:  Video Visit     Telemedicine Visit: The patient's condition can be safely assessed and treated via synchronous audio and visual telemedicine encounter.      Reason for Telemedicine Visit: Patient has requested telehealth visit, Patient unable to travel, Patient convenience (e.g. access to timely appointments / distance to available provider), Patient lives in a designated Health Professional Shortage Area (HPSA), and Services only offered telehealth    Originating Site (Patient Location): Patient's home    Distant Site (Provider Location): [facility]: [facility], [facility]    Consent:  The patient/guardian has verbally consented to: the potential risks and benefits of telemedicine (video visit) versus in person care; bill my insurance or make self-payment for services provided; and responsibility for payment of non-covered services.     Patient would like the video invitation sent by:  My Chart    Mode of Communication:  Video Conference via Medical Zoom    As the provider I attest to compliance with applicable laws and regulations related to telemedicine.        PATIENT'S NAME: Ayana Prasad  MRN:   [id number]  :   1990  ACCT. NUMBER: [id number]  DATE OF SERVICE: 22  START TIME:  1:00 PM  END TIME:  1:50 PM  FACILITATOR: Kacey Tena PsyD  TOPIC:  Process Group    Diagnoses:  Summary: Patient is a 32 year old transgender male with a mental health history ADHD, PTSD, anxiety and schizoaffective disorder. Patient did not provide any other mental health diagnosis, but medical records state: .      295.70  (F25) Schizoaffective Disorder Bipolar Type.  300.02 (F41.1) Generalized Anxiety Disorder  309.81 (F43.10) Posttraumatic Stress Disorder (includes Posttraumatic Stress Disorder for Children 6 Years and Younger)  Without dissociative symptoms.  314.01 (F90.9) Unspecified Attention -Deficit / Hyperactivity  "Disorder         ADHD (attention deficit hyperactivity disorder)       Bipolar 1 disorder       Borderline personality disorder       Cauda equina syndrome       Chronic low back pain       Depression       GERD (gastroesophageal reflux disease)       h/o TBI (traumatic brain injury)       Hypertension, unspecified type 12/16/2021     Marginal corneal ulcer, left 07/17/2015     Nephrolithiasis       obesity       Polysubstance abuse - methamphetamine, hallucinagen, heroin, marijuana       currently in remission     PONV (postoperative nausea and vomiting)       PTSD (post-traumatic stress disorder)        Psychiatry: Murali Gonzalez, CNP, [facility]  [facility]  :  Mental Health Resources: [place]: [phone]  Fax: [phone]  [place] Primary Care Provider: Dr. Becki Avery      [facility] Adult Mental Health Day Treatment  TRACK: 6B    NUMBER OF PARTICIPANTS: 6          Data:    Session content: At the start of this group, patients were invited to check in by identifying themselves, describing their current emotional status, and identifying issues to address in this group.   Area(s) of treatment focus addressed in this session included Symptom Management, Personal Safety and Community Resources/Discharge Planning.  Client reported being safe today.  Reported mood is \"stable.\"    Goal for today is to attend group therapy online and talk with others. The client talked to the group about how he had the flu, had to go to the hsopitl to get fluids, and was switched to Rexalti as a new medication for managing psychosis and mood symptoms. He reported that he feels much better today and rested over the weekend. He stated he was too sick to celebrate Thanksgiving dinner with others.       Therapeutic Interventions/Treatment Strategies:  Psychotherapist reinforced use of skills. Treatment modalities used include Cognitive Behavioral Therapy and Dialectical " Behavioral Therapy. Interventions include Coping Skills: Assisted patient in understanding the purpose of planning / creating / participating / sharing in positive experiences, Relapse Prevention: Coached on skills to manage factors that contribute to relapse, Mindfulness: Facilitated discussion of when/how to use mindfulness skills to benefit general health, mental health symptoms, and stressors and Symptoms Management: Promoted understanding of their diagnoses and how it impacts their functioning.    Assessment:    Patient response:   Patient responded to session by accepting feedback, listening and being attentive    Possible barriers to participation / learning include: severity of symptoms    Health Issues:   Yes: recovery from flu       Substance Use Review:   Substance Use: No active concerns identified.    Mental Status/Behavioral Observations  Appearance:   Appropriate   Eye Contact:   Good   Psychomotor Behavior: Normal   Attitude:   Cooperative   Orientation:   All  Speech   Rate / Production: Normal    Volume:  Normal   Mood:    Anxious  Depressed   Affect:    Constricted   Thought Content:   Rumination and Psychosis denies any symptoms of psychosis  Thought Form:  Coherent  Logical     Insight:    Good     Plan:     Safety Plan: Recommended that patient call 911 or go to the local ED should there be a change in any of these risk factors.     Barriers to treatment: None identified    Patient Contracts (see media tab):  None    Substance Use: Provided encouragement towards sobriety     Continue or Discharge: Patient will continue in Adult Day Treatment (ADT)  as planned. Patient is likely to benefit from learning and using skills as they work toward the goals identified in their treatment plan.      Kacey Tena PsyD  November 28, 2022  John Ling D,  Licensed Clinical Psychologist

## 2022-11-29 ENCOUNTER — HOSPITAL ENCOUNTER (OUTPATIENT)
Dept: BEHAVIORAL HEALTH | Facility: CLINIC | Age: 32
Discharge: HOME | End: 2022-11-29
Attending: PSYCHIATRY & NEUROLOGY
Payer: COMMERCIAL

## 2022-11-29 PROCEDURE — 90853 GROUP PSYCHOTHERAPY: CPT | Mod: GT,95 | Performed by: PSYCHOLOGIST

## 2022-11-29 PROCEDURE — 90853 GROUP PSYCHOTHERAPY: CPT | Mod: GT,95 | Performed by: SOCIAL WORKER

## 2022-11-29 NOTE — GROUP NOTE
Psychotherapy Group Note    Service Modality:  Video Visit     Telemedicine Visit: The patient's condition can be safely assessed and treated via synchronous audio and visual telemedicine encounter.      Reason for Telemedicine Visit: Patient has requested telehealth visit, Patient unable to travel, Patient convenience (e.g. access to timely appointments / distance to available provider), Patient lives in a designated Health Professional Shortage Area (HPSA), and Services only offered telehealth    Originating Site (Patient Location): Patient's home    Distant Site (Provider Location): remote site at therapist's home    Consent:  The patient/guardian has verbally consented to: the potential risks and benefits of telemedicine (video visit) versus in person care; bill my insurance or make self-payment for services provided; and responsibility for payment of non-covered services.     Patient would like the video invitation sent by:  My Chart    Mode of Communication:  Video Conference via Medical Zoom    As the provider I attest to compliance with applicable laws and regulations related to telemedicine.     PATIENT'S NAME: Ayana Prasad  MRN:   [id number]  :   1990  ACCT. NUMBER: [id number]  DATE OF SERVICE: 22  START TIME:  2:00 PM  END TIME:  2:50 PM  FACILITATOR: Kacey Tena PsyD  TOPIC: MH EBP Group: Specialty Awareness  [facility] Adult Mental Health Day Treatment  TRACK: 6B    NUMBER OF PARTICIPANTS: 6    Summary of Group / Topics Discussed:  Specialty Topics: Life Transitions: The topic of life transitions was presented in order to help patients to better understand the challenges presented by life transitions, and how to best navigate them. Exploring the phases of transition and how one works through them was discussed. Patients were provided with information regarding community resources.     Patient Session Goals / Objectives:    Discussed the timing and nature of major life  transitions    Explored how life transitions may impact mental health and functioning    Discussed coping strategies to manage symptoms and help with transitioning    Discussed and planned a successful transition      Patient Participation / Response:  Fully participated with the group by sharing personal reflections / insights and openly received / provided feedback with other participants.    Identified plan to address barriers to practicing skills discussed in topic  Randall talked to others about repairing relationships and rebuilding them.    Treatment Plan:  Patient has a  master individualized treatment plan is in the process of being developed with the patient and multi-disciplinary care team.    Amanda Meléndez Psy., D,  Licensed Clinical Psychologist

## 2022-11-29 NOTE — PROGRESS NOTES
[facility] Adult Mental Health Day Treatment  TRACK: 6B    PATIENT'S NAME: Ayana Prasad  MRN:   [id number]  :   1990  ACCT. NUMBER: [id number]  DATE OF SERVICE: 22  START TIME: 3:00  END TIME: 3:50  Service Modality: Video Visit:  Facilitators: DESIRE Bermeo, LICSW; Lorenzo Haynes OT      Provider verified identity through the following two step process.  Patient provided:  Patient is known previously to provider    Telemedicine Visit: The patient's condition can be safely assessed and treated via synchronous audio and visual telemedicine encounter.      Reason for Telemedicine Visit: Services only offered telehealth    Originating Site (Patient Location): Patient's home    Distant Site (Provider Location): Provider Remote Setting- Home Office    Consent:  The patient/guardian has verbally consented to: the potential risks and benefits of telemedicine (video visit) versus in person care; bill my insurance or make self-payment for services provided; and responsibility for payment of non-covered services.     Patient would like the video invitation sent by:  My Chart    Mode of Communication:  Video Conference via Medical Zoom    Distant Location (Provider):  Off-site    As the provider I attest to compliance with applicable laws and regulations related to telemedicine.    PROVIDER OVERSIGHT: Oswaldo Seaman MD  NUMBER OF PARTICIPANTS: 6      Summary of Group / Topics Discussed:  Resiliency Development:  Coping Skills (Symptoms of Schizoaffective/Schizophrenia) : Patients were taught how to identify stressors, signs of stress, coping skills, and prevention strategies for overall stress management.  Patients were given the opportunity to identify both ongoing and acute mental health symptoms and how to effectively manage these symptoms by developing an effective aftercare plan.  Patients increased awareness of community based resources.     Patient Session Goals /  "Objectives:    Identified how using coping skills can be used for symptom and stress management       Improved awareness of individualed symptoms and stressors and how to effectively cope     Established a relapse prevention plan to practice these skills in their own environments    Practiced and reflected on how to generalize taught skills to their everyday life    Education through video, \"Treatment Options for Schizophrenia\" with Galina Junior, Living Well with Schizophrenia           Patient Participation / Response:  Fully participated with the group by sharing personal reflections / insights and openly received / provided feedback with other participants.     Verbalized understanding of content     Treatment Plan:  Patient has a current master individualized treatment plan.  See Epic treatment plan for more information.     Negra Gonzales, LICSW            "

## 2022-11-29 NOTE — GROUP NOTE
Process Group Note    Service Modality:  Video Visit     Telemedicine Visit: The patient's condition can be safely assessed and treated via synchronous audio and visual telemedicine encounter.      Reason for Telemedicine Visit: Patient has requested telehealth visit, Patient unable to travel, Patient convenience (e.g. access to timely appointments / distance to available provider), Patient lives in a designated Health Professional Shortage Area (HPSA), and Services only offered telehealth    Originating Site (Patient Location): Patient's home    Distant Site (Provider Location): remote site at therapist's home    Consent:  The patient/guardian has verbally consented to: the potential risks and benefits of telemedicine (video visit) versus in person care; bill my insurance or make self-payment for services provided; and responsibility for payment of non-covered services.     Patient would like the video invitation sent by:  My Chart    Mode of Communication:  Video Conference via Medical Zoom    As the provider I attest to compliance with applicable laws and regulations related to telemedicine.     PATIENT'S NAME: Ayana Prasad  MRN:   [id number]  :   1990  ACCT. NUMBER: [id number]  DATE OF SERVICE: 22  START TIME:  1:00 PM  END TIME:  1:50 PM  FACILITATOR: Kacey Tena PsyD  TOPIC:  Process Group    Diagnoses:  Summary: Patient is a 32 year old transgender male with a mental health history ADHD, PTSD, anxiety and schizoaffective disorder. Patient did not provide any other mental health diagnosis, but medical records state: .      295.70  (F25) Schizoaffective Disorder Bipolar Type.  300.02 (F41.1) Generalized Anxiety Disorder  309.81 (F43.10) Posttraumatic Stress Disorder (includes Posttraumatic Stress Disorder for Children 6 Years and Younger)  Without dissociative symptoms.  314.01 (F90.9) Unspecified Attention -Deficit / Hyperactivity Disorder         ADHD (attention deficit hyperactivity  "disorder)       Bipolar 1 disorder       Borderline personality disorder       Cauda equina syndrome       Chronic low back pain       Depression       GERD (gastroesophageal reflux disease)       h/o TBI (traumatic brain injury)       Hypertension, unspecified type 12/16/2021     Marginal corneal ulcer, left 07/17/2015     Nephrolithiasis       obesity       Polysubstance abuse - methamphetamine, hallucinagen, heroin, marijuana       currently in remission     PONV (postoperative nausea and vomiting)       PTSD (post-traumatic stress disorder)        Psychiatry: Murali Gonzalez, CNP, [facility]  Therapy: Deborah Rosas, [facility]  :  Mental Health Resources: [facility] [place]: [phone]  Fax: [phone]  [place] Primary Care Provider: Dr. Becki Avery      [facility] Adult Mental Health Day Treatment  TRACK: 6B    NUMBER OF PARTICIPANTS: 6          Data:    Session content: At the start of this group, patients were invited to check in by identifying themselves, describing their current emotional status, and identifying issues to address in this group.   Area(s) of treatment focus addressed in this session included Symptom Management, Personal Safety and Community Resources/Discharge Planning.  Client reported being safe today.  Reported mood is     Goal for today is to attend group therapy online and talk with others.  The client talked to the group about how she will discharge and go to the 1 day a week clinic and see her individual therapist. She reported feeling bad about leaving and was grateful for the support from the group. She reported feeling a \"little out of sorts,\" because she will have a change in her schedule. She reported that she will continue to apply for jobs and prepare for interviews.      Therapeutic Interventions/Treatment Strategies:  Psychotherapist reinforced use of skills. Treatment modalities used include Cognitive Behavioral Therapy and Dialectical " Behavioral Therapy. Interventions include Coping Skills: Promoted understanding of how and when to apply grounding strategies to reduce distress and increase presence in the moment, Relapse Prevention: Assisted patient in identifying personal vulnerabilities, thoughts, emotions, and situations that may lead to relapse , Mindfulness: Facilitated discussion of when/how to use mindfulness skills to benefit general health, mental health symptoms, and stressors and Symptoms Management: Promoted understanding of their diagnoses and how it impacts their functioning.    Assessment:    Patient response:   Patient responded to session by giving feedback, listening and being attentive    Possible barriers to participation / learning include: severity of symptoms    Health Issues:   Yes: Tourette's, No Psychological Distress       Substance Use Review:   Substance Use: No active concerns identified.    Mental Status/Behavioral Observations  Appearance:   Appropriate   Eye Contact:   Good   Psychomotor Behavior: Normal   Attitude:   Cooperative   Orientation:   All  Speech   Rate / Production: Normal    Volume:  Normal   Mood:    Anxious   Affect:    Appropriate   Thought Content:   Clear  Thought Form:  Coherent  Logical     Insight:    Good     Plan:     Safety Plan: Recommended that patient call 911 or go to the local ED should there be a change in any of these risk factors.     Barriers to treatment: None identified    Patient Contracts (see media tab):  None    Substance Use: Provided encouragement towards sobriety     Continue or Discharge: Patient is being discharged today. See Treatment Plan and Discharge Summary.       Kacey Tena PsyD  November 29, 2022  John Ling, ROSALINA,  Licensed Clinical Psychologist

## 2022-12-01 ENCOUNTER — HOSPITAL ENCOUNTER (OUTPATIENT)
Dept: BEHAVIORAL HEALTH | Facility: CLINIC | Age: 32
Discharge: HOME | End: 2022-12-01
Attending: PSYCHIATRY & NEUROLOGY
Payer: COMMERCIAL

## 2022-12-01 PROCEDURE — 90853 GROUP PSYCHOTHERAPY: CPT | Mod: GT,95 | Performed by: PSYCHOLOGIST

## 2022-12-01 PROCEDURE — 90853 GROUP PSYCHOTHERAPY: CPT | Mod: GT,95 | Performed by: SOCIAL WORKER

## 2022-12-01 NOTE — GROUP NOTE
Psychoeducation Group Note                                    Service Modality:  Video Visit     Telemedicine Visit: The patient's condition can be safely assessed and treated via synchronous audio and visual telemedicine encounter.      Reason for Telemedicine Visit: Patient has requested telehealth visit, Patient unable to travel, Patient convenience (e.g. access to timely appointments / distance to available provider), Patient lives in a designated Health Professional Shortage Area (HPSA), and Services only offered telehealth    Originating Site (Patient Location): Patient's home    Distant Site (Provider Location): [facility]: [facility]    Consent:  The patient/guardian has verbally consented to: the potential risks and benefits of telemedicine (video visit) versus in person care; bill my insurance or make self-payment for services provided; and responsibility for payment of non-covered services.     Patient would like the video invitation sent by:  My Chart    Mode of Communication:  Video Conference via Medical Zoom    As the provider I attest to compliance with applicable laws and regulations related to telemedicine.        PATIENT'S NAME: Ayana Prasad  MRN:   [id number]  :   1990  ACCT. NUMBER: [id number]  DATE OF SERVICE: 22  START TIME:  2:00 PM  END TIME:  2:50 PM  FACILITATOR: Kacey Tena PsyD; Kristin Gongora RN  TOPIC:  Wellness Group: Mental Health Maintenance  [facility] 55+ Program  TRACK: 6B    NUMBER OF PARTICIPANTS: 6    Summary of Group / Topics Discussed:  Mental Health Maintenance:  Assessments of Strengths: Patients completed a self-reflection on personal strengths worksheet. The concept of personal strength as it relates to resilience were explored. Patients shared responses with the group and participated in discussion.     Patient Session Goals / Objectives:  ? Patients identified 1-3 qualities they consider a personal  strength.  ? Patients understood the concept of personal strengths and the connection it has to resiliency        Patient Participation / Response:  Fully participated with the group by sharing personal reflections / insights and openly received / provided feedback with other participants.    Identified / Expressed personal readiness to practice skills  Prakash reported strengths in arts and drawing with charcoal. He stated that he enjoys drawing landscapes. He talked about being funny with the group and being kind.    Treatment Plan:  Patient has a current master individualized treatment plan.  See Epic treatment plan for more information.    Kacey Tena PsyD

## 2022-12-01 NOTE — GROUP NOTE
Process Group Note                                    Service Modality:  Video Visit     Telemedicine Visit: The patient's condition can be safely assessed and treated via synchronous audio and visual telemedicine encounter.      Reason for Telemedicine Visit: Patient has requested telehealth visit, Patient unable to travel, Patient convenience (e.g. access to timely appointments / distance to available provider), Patient lives in a designated Health Professional Shortage Area (HPSA), and Services only offered telehealth    Originating Site (Patient Location): Patient's home    Distant Site (Provider Location): [facility]: [facility], [facility]    Consent:  The patient/guardian has verbally consented to: the potential risks and benefits of telemedicine (video visit) versus in person care; bill my insurance or make self-payment for services provided; and responsibility for payment of non-covered services.     Patient would like the video invitation sent by:  My Chart    Mode of Communication:  Video Conference via Medical Zoom    As the provider I attest to compliance with applicable laws and regulations related to telemedicine.        PATIENT'S NAME: Ayana Prasad  MRN:   [id number]  :   1990  ACCT. NUMBER: [id number]  DATE OF SERVICE: 22  START TIME:  1:00 PM  END TIME:  1:50 PM  FACILITATOR: Kacey Tena PsyD  TOPIC:  Process Group    Diagnoses:  Summary: Patient is a 32 year old transgender male with a mental health history ADHD, PTSD, anxiety and schizoaffective disorder. Patient did not provide any other mental health diagnosis, but medical records state: .      295.70  (F25) Schizoaffective Disorder Bipolar Type.  300.02 (F41.1) Generalized Anxiety Disorder  309.81 (F43.10) Posttraumatic Stress Disorder (includes Posttraumatic Stress Disorder for Children 6 Years and Younger)  Without dissociative symptoms.  314.01 (F90.9) Unspecified Attention -Deficit / Hyperactivity  "Disorder         ADHD (attention deficit hyperactivity disorder)       Bipolar 1 disorder       Borderline personality disorder       Cauda equina syndrome       Chronic low back pain       Depression       GERD (gastroesophageal reflux disease)       h/o TBI (traumatic brain injury)       Hypertension, unspecified type 12/16/2021     Marginal corneal ulcer, left 07/17/2015     Nephrolithiasis       obesity       Polysubstance abuse - methamphetamine, hallucinagen, heroin, marijuana       currently in remission     PONV (postoperative nausea and vomiting)       PTSD (post-traumatic stress disorder)        Psychiatry: Murali Gonzalez, CNP, [facility]  Therapy: Deborah Rosas, [facility]  :  Mental Health Resources: [facility] [place]: [phone]  Fax: [phone]  [place] Primary Care Provider: Dr. Becki Avery      [facility] 55+ Program  TRACK: 6B    NUMBER OF PARTICIPANTS: 6          Data:    Session content: At the start of this group, patients were invited to check in by identifying themselves, describing their current emotional status, and identifying issues to address in this group.   Area(s) of treatment focus addressed in this session included Symptom Management, Personal Safety and Community Resources/Discharge Planning.  Client reported being safe today.  Reported mood is \"good.\"    Goal for today is to attend group therapy and talk with others.  The client talked to the group about how he has been resting from having the flu and stayed home. He talked with others about sports and the [employer]. He stated that he cooks for himself and couldn't recall if anything was tasty lately.  He stated that he continues to monitor the [employer], and will determine if it is helping his symptoms.  He reported that he is managing PTSD symptoms and psychosis.      Therapeutic Interventions/Treatment Strategies:  Psychotherapist reinforced use of skills. Treatment modalities used include Cognitive " Behavioral Therapy and Dialectical Behavioral Therapy. Interventions include Coping Skills: Assisted patient in understanding the purpose of planning / creating / participating / sharing in positive experiences, Relapse Prevention: Assisted patient in identifying personal vulnerabilities, thoughts, emotions, and situations that may lead to relapse , Mindfulness: Facilitated discussion of when/how to use mindfulness skills to benefit general health, mental health symptoms, and stressors and Symptoms Management: Promoted understanding of their diagnoses and how it impacts their functioning.    Assessment:    Patient response:   Patient responded to session by giving feedback, listening and being attentive    Possible barriers to participation / learning include: severity of symptoms    Health Issues:   Yes: Pain, Associated Psychological Distress       Substance Use Review:   Substance Use: No active concerns identified.    Mental Status/Behavioral Observations  Appearance:   Appropriate   Eye Contact:   Good   Psychomotor Behavior: Normal   Attitude:   Cooperative   Orientation:   All  Speech   Rate / Production: Normal    Volume:  Normal   Mood:    Anxious  Depressed   Affect:    Constricted   Thought Content:   Rumination and Psychosis reports preservative thoughts  Thought Form:  Coherent  Logical     Insight:    Good     Plan:     Safety Plan: Recommended that patient call 911 or go to the local ED should there be a change in any of these risk factors.     Barriers to treatment: None identified    Patient Contracts (see media tab):  None    Substance Use: Provided encouragement towards sobriety     Continue or Discharge: Patient will continue in Adult Day Treatment (ADT)  as planned. Patient is likely to benefit from learning and using skills as they work toward the goals identified in their treatment plan.      Kacey Tena PsyD  December 1, 2022  John Ling D,  Licensed Clinical Psychologist

## 2022-12-01 NOTE — PROGRESS NOTES
"[facility] Adult Mental Health Day Treatment  TRACK: 6B    PATIENT'S NAME: Ayana Prasad  MRN:   [id number]  :   1990  ACCT. NUMBER: [id number]  DATE OF SERVICE: 22  START TIME: 3:00  END TIME: 3:50  Service Modality: Video Visit:  Facilitators: DESIRE Bermeo, LICSW; Lorenzo Haynes OT      Provider verified identity through the following two step process.  Patient provided:  Patient is known previously to provider    Telemedicine Visit: The patient's condition can be safely assessed and treated via synchronous audio and visual telemedicine encounter.      Reason for Telemedicine Visit: Services only offered telehealth    Originating Site (Patient Location): Patient's home    Distant Site (Provider Location): [facility]    Consent:  The patient/guardian has verbally consented to: the potential risks and benefits of telemedicine (video visit) versus in person care; bill my insurance or make self-payment for services provided; and responsibility for payment of non-covered services.     Patient would like the video invitation sent by:  My Chart    Mode of Communication:  Video Conference via Medical Zoom    Distant Location (Provider):  Off-site    As the provider I attest to compliance with applicable laws and regulations related to telemedicine.         Summary of Group / Topics Discussed:  Coping Skills: Additional Coping Skills:  Patients engaged in an insight oriented writing activity called \"Letters to The Illness\".  Reviewed the benefits of using writing as a coping strategy.  Patients explored how mindfulness and journaling can be used a tool to build awareness into how they are communicating to themselves about the mood disorder. Patients share their insights into their cognitive themes and experience to practice self disclosure.      Patient Session Goals / Objectives:    Understand the purpose and benefits of applying journaling and " mindfulness as coping strategies    Build self awareness into the stage of acceptance of the illness, clinical needs including self compassion.     Patient Participation / Response:  Minimally participated, only when prompted / asked.    Demonstrated understanding of topics discussed through group discussion and participation    Treatment Plan:  Patient has a current master individualized treatment plan.  See Epic treatment plan for more information.          Negra Gonzales, DESIRE, LICSW

## 2022-12-05 ENCOUNTER — HOSPITAL ENCOUNTER (OUTPATIENT)
Dept: BEHAVIORAL HEALTH | Facility: CLINIC | Age: 32
Discharge: HOME | End: 2022-12-05
Attending: PSYCHIATRY & NEUROLOGY
Payer: COMMERCIAL

## 2022-12-05 ENCOUNTER — HOSPITAL ENCOUNTER (OUTPATIENT)
Facility: CLINIC | Age: 32
End: 2022-12-05
Attending: SURGERY | Admitting: SURGERY
Payer: MEDICARE

## 2022-12-05 ENCOUNTER — MYC MEDICAL ADVICE (OUTPATIENT)
Dept: FAMILY MEDICINE | Facility: CLINIC | Age: 32
End: 2022-12-05

## 2022-12-05 DIAGNOSIS — R51.9 NONINTRACTABLE HEADACHE, UNSPECIFIED CHRONICITY PATTERN, UNSPECIFIED HEADACHE TYPE: Primary | ICD-10-CM

## 2022-12-05 PROCEDURE — 90853 GROUP PSYCHOTHERAPY: CPT | Mod: GT,95 | Performed by: PSYCHOLOGIST

## 2022-12-05 PROCEDURE — 90853 GROUP PSYCHOTHERAPY: CPT | Mod: GT,95 | Performed by: SOCIAL WORKER

## 2022-12-05 NOTE — GROUP NOTE
Psychoeducation Group Note    PATIENT'S NAME: Ayana Prasad  MRN:   [id number]  :   1990  ACCT. NUMBER: [id number]  DATE OF SERVICE: 22  START TIME:  3:00 PM  END TIME:  3:50 PM  FACILITATOR: Negra Gonzales LICSW; Juli Thibodeaux OTR/L  TOPIC: MH Life Skills Group: Communication and Social Skills Development  Service Modality:  Video Visit     Telemedicine Visit: The patient's condition can be safely assessed and treated via synchronous audio and visual telemedicine encounter.       Reason for Telemedicine Visit: Services only offered telehealth and due to COVID-19.     Originating Site (Patient Location): Patient's home     Distant Site (Provider Location): Provider Remote Setting- Home Office     Consent:  The patient/guardian has verbally consented to: the potential risks and benefits of telemedicine (video visit) versus in person care; bill my insurance or make self-payment for services provided; and responsibility for payment of non-covered services.      Patient would like the video invitation sent by:  My Chart     Mode of Communication:  Video Conference via Medical Zoom     As the provider I attest to compliance with applicable laws and regulations related to telemedicine.     [facility] 55+ Program  TRACK: B1    NUMBER OF PARTICIPANTS: 7    Summary of Group / Topics Discussed:  Communication and Social Skills Development: Communication Styles: Am I Assertive?: Patients completed a self assessment of assertiveness skills and how this impacts their communication with other people.  Patients were given an opportunity to identify strengths as well as areas for improvement in assertive communication.    Patient Session Goals / Objectives:    Identified strengths and areas for improvement in assertive communication and how that impacts their ability to communicate clearly with other people       Improved awareness of important aspects of assertive communication and how this relates to  mental health recovery        Established a plan for practice of these skills in their own environments    Practiced and reflected on how to generalize taught skills to their everyday life      Patient Participation / Response:  Fully participated with the group by sharing personal reflections / insights and openly received / provided feedback with other participants.    Verbalized understanding of content    Treatment Plan:  Patient has a current master individualized treatment plan.  See Epic treatment plan for more information.    Negra Gonzales, LICSW

## 2022-12-05 NOTE — GROUP NOTE
Process Group Note                                    Service Modality:  Video Visit     Telemedicine Visit: The patient's condition can be safely assessed and treated via synchronous audio and visual telemedicine encounter.      Reason for Telemedicine Visit: Patient has requested telehealth visit, Patient unable to travel, Patient convenience (e.g. access to timely appointments / distance to available provider), Patient lives in a designated Health Professional Shortage Area (HPSA), and Services only offered telehealth    Originating Site (Patient Location): Patient's home    Distant Site (Provider Location): [facility]: [facility], [facility]    Consent:  The patient/guardian has verbally consented to: the potential risks and benefits of telemedicine (video visit) versus in person care; bill my insurance or make self-payment for services provided; and responsibility for payment of non-covered services.     Patient would like the video invitation sent by:  My Chart    Mode of Communication:  Video Conference via Medical Zoom    As the provider I attest to compliance with applicable laws and regulations related to telemedicine.        PATIENT'S NAME: Aynaa Prasad  MRN:   [id number]  :   1990  ACCT. NUMBER: [id number]  DATE OF SERVICE: 22  START TIME:  1:00 PM  END TIME:  1:50 PM  FACILITATOR: Kacey Tena PsyD  TOPIC:  Process Group    Diagnoses:  Summary: Patient is a 32 year old transgender male with a mental health history ADHD, PTSD, anxiety and schizoaffective disorder. Patient did not provide any other mental health diagnosis, but medical records state: .      295.70  (F25) Schizoaffective Disorder Bipolar Type.  300.02 (F41.1) Generalized Anxiety Disorder  309.81 (F43.10) Posttraumatic Stress Disorder (includes Posttraumatic Stress Disorder for Children 6 Years and Younger)  Without dissociative symptoms.  314.01 (F90.9) Unspecified Attention -Deficit / Hyperactivity  "Disorder         ADHD (attention deficit hyperactivity disorder)       Bipolar 1 disorder       Borderline personality disorder       Cauda equina syndrome       Chronic low back pain       Depression       GERD (gastroesophageal reflux disease)       h/o TBI (traumatic brain injury)       Hypertension, unspecified type 12/16/2021     Marginal corneal ulcer, left 07/17/2015     Nephrolithiasis       obesity       Polysubstance abuse - methamphetamine, hallucinagen, heroin, marijuana       currently in remission     PONV (postoperative nausea and vomiting)       PTSD (post-traumatic stress disorder)        Psychiatry: Murali Gonzalez, CNP, [facility]  Therapy: Deborah Rosas, [facility]  :  Mental Health Resources: [facility] [place]: [phone]  Fax: [phone]  [place] Primary Care Provider: Dr. Becki Avery      [facility] 55+ Program  TRACK: 6B    NUMBER OF PARTICIPANTS: 6          Data:    Session content: At the start of this group, patients were invited to check in by identifying themselves, describing their current emotional status, and identifying issues to address in this group.   Area(s) of treatment focus addressed in this session included Symptom Management, Personal Safety and Community Resources/Discharge Planning.  Client reported being safe today.  Reported mood is \"sick.\"    Goal for today is to attend group therapy online and talk with others. The client talked to the group about how he had problems with vomiting and fever and talked with staff about covid 19, the flu, and viruses. He reported problems with movement and medication side effects and talked with others about tardive dyskinesia and akathisia, and [place]. He reported chronic pain and heart troubles. He reported a temperature of 101 and took some tylenol. He reported that he went to a bar on the past weekend and talked about getting the flu again.      Therapeutic Interventions/Treatment " Strategies:  Psychotherapist reinforced use of skills. Treatment modalities used include Cognitive Behavioral Therapy and Dialectical Behavioral Therapy. Interventions include Coping Skills: Discussed meditation skills and addressed ways to implement meditation skills , Relapse Prevention: Coached on skills to manage factors that contribute to relapse, Mindfulness: Facilitated discussion of when/how to use mindfulness skills to benefit general health, mental health symptoms, and stressors and Symptoms Management: Promoted understanding of their diagnoses and how it impacts their functioning.    Assessment:    Patient response:   Patient responded to session by focusing on goals, being attentive and appearing engaged    Possible barriers to participation / learning include: severity of symptoms    Health Issues: restless legs possibility   None reported       Substance Use Review:   Substance Use: No active concerns identified.    Mental Status/Behavioral Observations  Appearance:   Appropriate   Eye Contact:   Good   Psychomotor Behavior: Normal   Attitude:   Cooperative   Orientation:   All  Speech   Rate / Production: Normal/ Responsive Normal    Volume:  Normal   Mood:    Anxious  Depressed   Affect:    Constricted   Thought Content:   Rumination and Psychosis reports paranoia  Thought Form:  Coherent  Logical     Insight:    Good     Plan:     Safety Plan: Recommended that patient call 911 or go to the local ED should there be a change in any of these risk factors.     Barriers to treatment: None identified    Patient Contracts (see media tab):  None    Substance Use: Provided encouragement towards sobriety     Continue or Discharge: Patient will continue in Adult Day Treatment (ADT)  as planned. Patient is likely to benefit from learning and using skills as they work toward the goals identified in their treatment plan.      Kacey Tena PsyD  December 5, 2022  Lisa Ling., D,  Licensed Clinical  Psychologist

## 2022-12-05 NOTE — GROUP NOTE
Psychoeducation Group Note                                    Service Modality:  Video Visit     Telemedicine Visit: The patient's condition can be safely assessed and treated via synchronous audio and visual telemedicine encounter.      Reason for Telemedicine Visit: Patient has requested telehealth visit, Patient unable to travel, Patient convenience (e.g. access to timely appointments / distance to available provider), Patient lives in a designated Health Professional Shortage Area (HPSA), and Services only offered telehealth    Originating Site (Patient Location): Patient's home    Distant Site (Provider Location): [facility]: [facility], [facility]    Consent:  The patient/guardian has verbally consented to: the potential risks and benefits of telemedicine (video visit) versus in person care; bill my insurance or make self-payment for services provided; and responsibility for payment of non-covered services.     Patient would like the video invitation sent by:  My Chart    Mode of Communication:  Video Conference via Medical Zoom    As the provider I attest to compliance with applicable laws and regulations related to telemedicine.        PATIENT'S NAME: Ayana Prasad  MRN:   [id number]  :   1990  ACCT. NUMBER: [id number]  DATE OF SERVICE: 22  START TIME:  2:00 PM  END TIME:  2:50 PM  FACILITATOR: Kacey Tena PsyD; Kristin Gongora RN  TOPIC:  Wellness Group: Health Maintenance  [facility] Adult Mental Health Day Treatment  TRACK: 6B    NUMBER OF PARTICIPANTS: 6    Summary of Group / Topics Discussed:  Health Maintenance: Goal Setting: Meaningful goals can bring a sense of direction and purpose in life.  They also highlight our most important values. Patients were assisted by instructor to identify short term goals to promote their mental health recovery and improve overall health and wellness. Patients were educated on SMART goal setting framework as a strategy to  increase outcomes and promote success.    Patient Session Goals / Objectives:  ? Explained the key concepts of SMART goal setting framework  ? Identified three goals successfully using SMART goal setting framework  ? Reviewed concept of balance in wellness as it pertains to goal setting        Patient Participation / Response:  Fully participated with the group by sharing personal reflections / insights and openly received / provided feedback with other participants.  Prakash talked with the group about physical health issues and caring for himself. He discussed how to manage flu-like symptoms with fever.   Demonstrated understanding of topics discussed through group discussion and participation and Identified / Expressed personal readiness to practice skills    Treatment Plan:  Patient has a current master individualized treatment plan.  See Epic treatment plan for more information.    Amanda Meléndez Psy., D,  Licensed Clinical Psychologist

## 2022-12-06 ENCOUNTER — HOSPITAL ENCOUNTER (OUTPATIENT)
Dept: BEHAVIORAL HEALTH | Facility: CLINIC | Age: 32
Discharge: HOME | End: 2022-12-06
Attending: PSYCHIATRY & NEUROLOGY
Payer: COMMERCIAL

## 2022-12-06 PROCEDURE — 90853 GROUP PSYCHOTHERAPY: CPT | Mod: GT,95 | Performed by: PSYCHOLOGIST

## 2022-12-06 PROCEDURE — 90853 GROUP PSYCHOTHERAPY: CPT | Mod: GT,95 | Performed by: SOCIAL WORKER

## 2022-12-06 NOTE — GROUP NOTE
Process Group Note                                    Service Modality:  Video Visit     Telemedicine Visit: The patient's condition can be safely assessed and treated via synchronous audio and visual telemedicine encounter.      Reason for Telemedicine Visit: Patient has requested telehealth visit, Patient unable to travel, Patient convenience (e.g. access to timely appointments / distance to available provider), Patient lives in a designated Health Professional Shortage Area (HPSA), and Services only offered telehealth    Originating Site (Patient Location): Patient's home    Distant Site (Provider Location): [facility]: [facility], [facility]    Consent:  The patient/guardian has verbally consented to: the potential risks and benefits of telemedicine (video visit) versus in person care; bill my insurance or make self-payment for services provided; and responsibility for payment of non-covered services.     Patient would like the video invitation sent by:  My Chart    Mode of Communication:  Video Conference via Medical Zoom    As the provider I attest to compliance with applicable laws and regulations related to telemedicine.        PATIENT'S NAME: Ayana Prasad  MRN:   [id number]  :   1990  ACCT. NUMBER: [id number]  DATE OF SERVICE: 22  START TIME:  1:00 PM  END TIME:  1:50 PM  FACILITATOR: Kacey Tena PsyD  TOPIC:  Process Group    Diagnoses:  Summary: Patient is a 32 year old transgender male with a mental health history ADHD, PTSD, anxiety and schizoaffective disorder. Patient did not provide any other mental health diagnosis, but medical records state: .      295.70  (F25) Schizoaffective Disorder Bipolar Type.  300.02 (F41.1) Generalized Anxiety Disorder  309.81 (F43.10) Posttraumatic Stress Disorder (includes Posttraumatic Stress Disorder for Children 6 Years and Younger)  Without dissociative symptoms.  314.01 (F90.9) Unspecified Attention -Deficit / Hyperactivity  Disorder         ADHD (attention deficit hyperactivity disorder)       Bipolar 1 disorder       Borderline personality disorder       Cauda equina syndrome       Chronic low back pain       Depression       GERD (gastroesophageal reflux disease)       h/o TBI (traumatic brain injury)       Hypertension, unspecified type 12/16/2021     Marginal corneal ulcer, left 07/17/2015     Nephrolithiasis       obesity       Polysubstance abuse - methamphetamine, hallucinagen, heroin, marijuana       currently in remission     PONV (postoperative nausea and vomiting)       PTSD (post-traumatic stress disorder)        Psychiatry: Murali Gonzalez, CNP, [facility]  Therapy: Deborah Rosas, [facility]  :  Mental Health Resources: [facility] [place]: [phone]  Fax: [phone]  [place] Primary Care Provider: Dr. Becki Avery      [facility] Adult Mental Health Day Treatment  TRACK: 6B    NUMBER OF PARTICIPANTS: 6          Data:    Session content: At the start of this group, patients were invited to check in by identifying themselves, describing their current emotional status, and identifying issues to address in this group.   Area(s) of treatment focus addressed in this session included Symptom Management, Personal Safety and Community Resources/Discharge Planning.  Client reported being safe today.  Reported mood is tired and nauseated.     Goal for today is to attend group therapy online and rest. The client talked to the group about how he felt ill and was resting at home. He stated that he wasn't sure if he still had a fever or not. He stated that his roommates were caring for his dog and that he had enough food to eat.        Therapeutic Interventions/Treatment Strategies:  Psychotherapist reinforced use of skills. Treatment modalities used include Cognitive Behavioral Therapy and Dialectical Behavioral Therapy. Interventions include Coping Skills: Promoted understanding of how and when to apply  grounding strategies to reduce distress and increase presence in the moment, Relapse Prevention: Facilitated understanding the importance of awareness of factors that contribute to relapse , Mindfulness: Facilitated discussion of when/how to use mindfulness skills to benefit general health, mental health symptoms, and stressors and Symptoms Management: Promoted understanding of their diagnoses and how it impacts their functioning.    Assessment:    Patient response:   Patient responded to session by listening, being attentive and accepting support    Possible barriers to participation / learning include: severity of symptoms    Health Issues:   Yes: Pain, Associated Psychological Distress  feeling nauseated       Substance Use Review:   Substance Use: No active concerns identified.    Mental Status/Behavioral Observations  Appearance:   Appropriate   Eye Contact:   Good   Psychomotor Behavior: Normal   Attitude:   Cooperative   Orientation:   All  Speech   Rate / Production: Normal    Volume:  Normal   Mood:    Depressed   Affect:    Constricted   Thought Content:   Rumination and Psychosis reports hallucinations auditory  Thought Form:  Coherent  Logical     Insight:    Good     Plan:     Safety Plan: Recommended that patient call 911 or go to the local ED should there be a change in any of these risk factors.     Barriers to treatment: None identified    Patient Contracts (see media tab):  None    Substance Use: Provided encouragement towards sobriety     Continue or Discharge: Patient will continue in Adult Day Treatment (ADT)  as planned. Patient is likely to benefit from learning and using skills as they work toward the goals identified in their treatment plan.      Kacey Tena PsyD  December 6, 2022  John Ling D,  Licensed Clinical Psychologist

## 2022-12-07 ENCOUNTER — TELEPHONE (OUTPATIENT)
Dept: PLASTIC SURGERY | Facility: CLINIC | Age: 32
End: 2022-12-07

## 2022-12-07 NOTE — TELEPHONE ENCOUNTER
[facility] :  Care Coordination Note     SITUATION   Prakash Prasad is a 32 year old adult who is receiving support for: gender dysphoria.    BACKGROUND     Pt has seen Dr Mayers for gender affirming top surgery consultation  Pt has surgery scheduled for 1/4/23    ASSESSMENT     Pt has LOS in chart, reviewed by   Pt had a pre-op mammogram (negative) on 10/20/22.  Per Dr Mayers's consultation note, needed to quit nicotine use before scheduling surgery. Pt quit 11/15/22.    PLAN     Follow-up plan:    Followed up with pt to confirm that he continues to be nicotine free. Pt shared that he has been nicotine free since 11/15/22 except for one day. Discussed that pt will need to be nicotine free at least one month before and one month after surgery, to which he verbalized an understanding.  Pt has pre-op H&P scheduled for 12/15 with PCP at [facility]  Pt also has pre-op surgical appointment with Dr Mayers on 12/27/22.     Carlos Burkett RN

## 2022-12-07 NOTE — GROUP NOTE
Psychoeducation Group Note    PATIENT'S NAME: Ayana Prasad  MRN:   [id number]  :   1990  ACCT. NUMBER: [id number]  DATE OF SERVICE: 22  START TIME:  3:00 PM  END TIME:  3:50 PM  FACILITATOR: Negra Gonzales LICSW; Lorenzo Haynes, OTR/L  TOPIC: MH Life Skills Group: Resiliency Development  Service Modality:  Video Visit     Telemedicine Visit: The patient's condition can be safely assessed and treated via synchronous audio and visual telemedicine encounter.       Reason for Telemedicine Visit: Services only offered telehealth and due to COVID-19.     Originating Site (Patient Location): Patient's home     Distant Site (Provider Location): Provider Remote Setting- Home Office     Consent:  The patient/guardian has verbally consented to: the potential risks and benefits of telemedicine (video visit) versus in person care; bill my insurance or make self-payment for services provided; and responsibility for payment of non-covered services.      Patient would like the video invitation sent by:  My Chart     Mode of Communication:  Video Conference via Medical Zoom     As the provider I attest to compliance with applicable laws and regulations related to telemedicine.    [facility] Adult Mental Health Day Treatment  TRACK: 6B    NUMBER OF PARTICIPANTS:     Summary of Group / Topics Discussed:  Resiliency Development:  Coping Skills: Self Awareness (Emotion & Knowledge): Patients were taught how to identify stressors, signs of stress, coping skills, and prevention strategies for overall stress management.  Patients were given the opportunity to identify both ongoing and acute mental health symptoms and how to effectively manage these symptoms by developing an effective aftercare plan.  Patients increased awareness of community based resources.    Patient Session Goals / Objectives:    Identified how using coping skills can be used for symptom and stress management       Improved awareness of  individualed symptoms and stressors and how to effectively cope     Established a relapse prevention plan to practice these skills in their own environments    Practiced and reflected on how to generalize taught skills to their everyday life    Educated about self soothing through the senses.        Patient Participation / Response:  Fully participated with the group by sharing personal reflections / insights and openly received / provided feedback with other participants.    Verbalized understanding of content    Treatment Plan:  Patient has a current master individualized treatment plan.  See Epic treatment plan for more information.    Negra Gonzales, BOBSW

## 2022-12-08 ENCOUNTER — TELEPHONE (OUTPATIENT)
Dept: BEHAVIORAL HEALTH | Facility: CLINIC | Age: 32
End: 2022-12-08

## 2022-12-08 NOTE — TELEPHONE ENCOUNTER
Absence call- Phone call and message left to check on Ayana Prasad  Since Ayana Prasad did not attend today.    Lisa Ling., D,  L.P.    Invited via [employer]

## 2022-12-08 NOTE — TELEPHONE ENCOUNTER
Called and left VM regarding attending group today. Requested call back or to join group if able.  Kristin Gongora RN on 12/8/2022 at 1:35 PM

## 2022-12-12 ENCOUNTER — TELEPHONE (OUTPATIENT)
Dept: BEHAVIORAL HEALTH | Facility: CLINIC | Age: 32
End: 2022-12-12

## 2022-12-12 NOTE — TELEPHONE ENCOUNTER
Absence call- Phone call and message left to check on Ayana Prasad  Since Ayana Prasad did not attend today.    Kacey Tena, Psy., D,  L.P.

## 2022-12-12 NOTE — TELEPHONE ENCOUNTER
Left voicemail for patient on 11/28 regarding scheduling surgery with Dr. Mayers.    Holding date: 1/4/23    Provided contact number to discuss.    P: [phone]    __    Deena Youngblood, Perioperative Coordinator, on 11/28/2022 at 5:06 PM      
Left voicemail for patient on 12/6 regarding scheduled surgery with Dr. Mayers + appointments.    Notified patient of need for H&P. Will also send MyChart.    Provided contact number to discuss.    P: [phone]    __    Deena Youngblood, Senior Perioperative Coordinator, on 12/6/2022 at 5:03 PM      
RN Care Coordinator: Carlos Burkett; [phone]    Surgery is scheduled with Dr. Mayers   Date: 1/4   Location: [facility]  Scheduled per: next available date    H&P to be completed by Primary Care    Surgical consult: 12/27     Post-op visit(s): 1/10 + 2/14    Patient will receive a phone call from pre-admission nurses 1-2 days prior to surgery with arrival and start time.        Left voicemail on 12/6. Sent [employer] message on 12/12 to confirm all information after no call back from patient.       Patient questions/concerns: N/A       Surgery packet was sent via [employer]    __    Deena Youngblood, Senior Perioperative Coordinator, on 12/12/2022 at 3:48 PM  P: [phone]  
Received voicemail from patient regarding scheduling surgery with Dr. Mayers on 1/4.    Patient unable to chat when writer called, and requested a call to discuss appointments tomorrow. Writer let Prakash know that some appointments will be tentatively scheduled and can be discussed tomorrow.     RN Care Coordinator: Carlos Ríos; [phone]    Surgery is scheduled with Dr. Mayers   Date: 1/4   Location: [facility]  Scheduled per: next available date    H&P to be completed by: Primary Care    Surgical consult: 12/27    Post-op: 1/10 & 2/14    Patient will receive a phone call from pre-admission nurses 1-2 days prior to surgery with arrival and start time.      Patient questions/concerns: N/A       Surgery packet to be sent via US mail and via [employer]    __    Deena Youngblood, Senior Perioperative Coordinator, on 12/5/2022 at 12:18 PM  P: [phone]  
no

## 2022-12-13 ENCOUNTER — TELEPHONE (OUTPATIENT)
Dept: BEHAVIORAL HEALTH | Facility: CLINIC | Age: 32
End: 2022-12-13

## 2022-12-14 ENCOUNTER — VIRTUAL VISIT (OUTPATIENT)
Dept: PSYCHIATRY | Facility: CLINIC | Age: 32
End: 2022-12-14
Attending: NURSE PRACTITIONER
Payer: COMMERCIAL

## 2022-12-14 ENCOUNTER — TELEPHONE (OUTPATIENT)
Dept: PSYCHIATRY | Facility: CLINIC | Age: 32
End: 2022-12-14

## 2022-12-14 ENCOUNTER — MYC MEDICAL ADVICE (OUTPATIENT)
Dept: PSYCHIATRY | Facility: CLINIC | Age: 32
End: 2022-12-14

## 2022-12-14 DIAGNOSIS — F51.05 INSOMNIA DUE TO OTHER MENTAL DISORDER: ICD-10-CM

## 2022-12-14 DIAGNOSIS — F99 INSOMNIA DUE TO OTHER MENTAL DISORDER: ICD-10-CM

## 2022-12-14 DIAGNOSIS — F90.2 ATTENTION DEFICIT HYPERACTIVITY DISORDER (ADHD), COMBINED TYPE: Primary | ICD-10-CM

## 2022-12-14 DIAGNOSIS — F25.0 SCHIZOAFFECTIVE DISORDER, BIPOLAR TYPE (H): ICD-10-CM

## 2022-12-14 DIAGNOSIS — F90.2 ATTENTION DEFICIT HYPERACTIVITY DISORDER (ADHD), COMBINED TYPE: ICD-10-CM

## 2022-12-14 PROCEDURE — 99214 OFFICE O/P EST MOD 30 MIN: CPT | Mod: 95 | Performed by: NURSE PRACTITIONER

## 2022-12-14 RX ORDER — DEXTROAMPHETAMINE SACCHARATE, AMPHETAMINE ASPARTATE MONOHYDRATE, DEXTROAMPHETAMINE SULFATE AND AMPHETAMINE SULFATE 3.75; 3.75; 3.75; 3.75 MG/1; MG/1; MG/1; MG/1
15 CAPSULE, EXTENDED RELEASE ORAL EVERY MORNING
Qty: 30 CAPSULE | Refills: 0 | Status: SHIPPED | OUTPATIENT
Start: 2022-12-14 | End: 2023-01-24

## 2022-12-14 RX ORDER — DEXTROAMPHETAMINE SACCHARATE, AMPHETAMINE ASPARTATE MONOHYDRATE, DEXTROAMPHETAMINE SULFATE AND AMPHETAMINE SULFATE 3.75; 3.75; 3.75; 3.75 MG/1; MG/1; MG/1; MG/1
15 CAPSULE, EXTENDED RELEASE ORAL EVERY MORNING
Qty: 30 CAPSULE | Refills: 0 | Status: SHIPPED | OUTPATIENT
Start: 2022-12-14 | End: 2022-12-14

## 2022-12-14 RX ORDER — OLANZAPINE 20 MG/1
20 TABLET ORAL AT BEDTIME
Qty: 30 TABLET | Refills: 0 | Status: SHIPPED | OUTPATIENT
Start: 2022-12-14 | End: 2023-01-12

## 2022-12-14 NOTE — TELEPHONE ENCOUNTER
[facility]    Phone Message    May a detailed message be left on voicemail: yes     Reason for Call: Other: Pt called requesting to speak with the nurse about his ins and adderall. Pt has rescricted pharmacy access and the pharmacy that the adderall was sent to, is out of that medication. He needs with getting access to the pharmacy that he found which also carries the adderall.      Action Taken: Other: [facility] psych pool    Travel Screening: Not Applicable
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
## 2022-12-14 NOTE — TELEPHONE ENCOUNTER
Replacement Rx sent to [facility] for Adderall XR. Called [facility] and requested that they cancel the Rx sent there earlier today. (Spoke with Ayana).

## 2022-12-14 NOTE — PROGRESS NOTES
Ayana Prasad is a 32 year old who is being evaluated via a billable video visit.      Pt will join video visit via: Hemarina  If there are problems joining the visit, send backup video invite via: Text to preferred phone: [phone]    Reason for telehealth visit: Patient has requested telehealth visit    Originating location (patient location): Patient's home    Will anyone else be joining the visit? No

## 2022-12-14 NOTE — PATIENT INSTRUCTIONS
-Increase Adderall XR to 15 mg daily for ADHD. Monitor for changes in anxiety. Please monitor blood pressure 2 times a week and report them back to mattie via Wipit.  -OK to continue on all other medications for now if EKG is relatively normal.    -Complete EKG tomorrow.  -Call IOP to say you are no longer continuing IOP.    Your next appointment is scheduled on 1/12/2023 (Thu) at 1:30pm.      **For crisis resources, please see the information at the end of this document**   Patient Education    Thank you for coming to the [facility] [place] [facility].     Lab Testing:  If you had lab testing today and your results are reassuring or normal they will be mailed to you or sent through ActivIdentity within 7 days. If the lab tests need quick action we will call you with the results. The phone number we will call with results is # [phone]. If this is not the best number please call our clinic and change the number.     Medication Refills:  If you need any refills please call your pharmacy and they will contact us. Our fax number for refills is [phone].   Three business days of notice are needed for general medication refill requests.   Five business days of notice are needed for controlled substance refill requests.   If you need to change to a different pharmacy, please contact the new pharmacy directly. The new pharmacy will help you get your medications transferred.     Contact Us:  Please call [phone] during business hours (8-5:00 M-F).   If you have medication related questions after clinic hours, or on the weekend, please call [phone].     Financial Assistance [phone]   Medical Records [phone]       MENTAL HEALTH CRISIS RESOURCES:  For a emergency help, please call 911 or go to the nearest Emergency Department.     Emergency Walk-In Options:   Em[facility] Unit @ [facility] ([place]): [phone] - Specialized mental health emergency area designed to be  [facility] ([place]): [phone]  [facility] ([place]): [phone]  [facility] ([place]): [phone]    County Crisis Information:   [place]: [phone]  [place]: [phone]  [place] ([place]) - Adult: [phone]     Child: [phone]  [place] - Adult: [phone]     Child: [phone]  [place]: [phone]  List of all [facility] resources:   https://[place].gov/dhs/people-we-serve/adults/health-care/mental-health/resources/crisis-contacts.jsp    National Crisis Information:   Crisis Text Line: Text  [place]  to 274931  Suicide & Crisis Lifeline: 988  National Suicide Prevention Lifeline: 8-531-636-TALK (1-113.903.6440)       For online chat options, visit https://suicidepreventionlifeline.org/chat/  Poison Control Center: 1-227.220.9357  Trans Lifeline: 1-714.605.3134 - Hotline for transgender people of all ages  The [employer] Project: 6-584-462-9501 - Hotline for LGBT youth     For Non-Emergency Support:   Fast Tracker: Mental Health & Substance Use Disorder Resources -   https://www.[employer] Semicon[employer]ortrackDaptivn.org/

## 2022-12-15 ENCOUNTER — OFFICE VISIT (OUTPATIENT)
Dept: FAMILY MEDICINE | Facility: CLINIC | Age: 32
End: 2022-12-15
Payer: COMMERCIAL

## 2022-12-15 ENCOUNTER — MYC MEDICAL ADVICE (OUTPATIENT)
Dept: BEHAVIORAL HEALTH | Facility: CLINIC | Age: 32
End: 2022-12-15

## 2022-12-15 ENCOUNTER — TELEPHONE (OUTPATIENT)
Dept: BEHAVIORAL HEALTH | Facility: CLINIC | Age: 32
End: 2022-12-15

## 2022-12-15 VITALS
HEART RATE: 78 BPM | DIASTOLIC BLOOD PRESSURE: 100 MMHG | TEMPERATURE: 97.7 F | SYSTOLIC BLOOD PRESSURE: 138 MMHG | OXYGEN SATURATION: 95 % | RESPIRATION RATE: 16 BRPM | BODY MASS INDEX: 39.25 KG/M2 | WEIGHT: 235.6 LBS | HEIGHT: 65 IN

## 2022-12-15 DIAGNOSIS — Z01.818 PRE-OP EXAM: ICD-10-CM

## 2022-12-15 DIAGNOSIS — E78.1 HYPERTRIGLYCERIDEMIA: ICD-10-CM

## 2022-12-15 DIAGNOSIS — R73.03 PREDIABETES: ICD-10-CM

## 2022-12-15 DIAGNOSIS — F25.0 SCHIZOAFFECTIVE DISORDER, BIPOLAR TYPE (H): ICD-10-CM

## 2022-12-15 DIAGNOSIS — Z78.9 FEMALE-TO-MALE TRANSGENDER PERSON: Primary | ICD-10-CM

## 2022-12-15 DIAGNOSIS — I10 BENIGN ESSENTIAL HYPERTENSION: ICD-10-CM

## 2022-12-15 LAB
ANION GAP SERPL CALCULATED.3IONS-SCNC: 9 MMOL/L (ref 3–14)
BUN SERPL-MCNC: 18 MG/DL (ref 7–30)
CALCIUM SERPL-MCNC: 10.3 MG/DL (ref 8.5–10.1)
CHLORIDE BLD-SCNC: 108 MMOL/L (ref 94–109)
CHOLEST SERPL-MCNC: 89 MG/DL
CO2 SERPL-SCNC: 19 MMOL/L (ref 20–32)
CREAT SERPL-MCNC: 0.73 MG/DL (ref 0.52–1.25)
FASTING STATUS PATIENT QL REPORTED: YES
GFR SERPL CREATININE-BSD FRML MDRD: >90 ML/MIN/1.73M2
GLUCOSE BLD-MCNC: 119 MG/DL (ref 70–99)
HBA1C MFR BLD: 6.5 % (ref 0–5.6)
HDLC SERPL-MCNC: 44 MG/DL
LDLC SERPL CALC-MCNC: 10 MG/DL
LITHIUM SERPL-SCNC: 1.4 MMOL/L (ref 0.6–1.2)
NONHDLC SERPL-MCNC: 45 MG/DL
POTASSIUM BLD-SCNC: 3.8 MMOL/L (ref 3.4–5.3)
SODIUM SERPL-SCNC: 136 MMOL/L (ref 133–144)
TRIGL SERPL-MCNC: 173 MG/DL

## 2022-12-15 PROCEDURE — 90686 IIV4 VACC NO PRSV 0.5 ML IM: CPT | Performed by: NURSE PRACTITIONER

## 2022-12-15 PROCEDURE — 80048 BASIC METABOLIC PNL TOTAL CA: CPT | Performed by: NURSE PRACTITIONER

## 2022-12-15 PROCEDURE — 93000 ELECTROCARDIOGRAM COMPLETE: CPT | Performed by: NURSE PRACTITIONER

## 2022-12-15 PROCEDURE — 36415 COLL VENOUS BLD VENIPUNCTURE: CPT | Performed by: NURSE PRACTITIONER

## 2022-12-15 PROCEDURE — 83036 HEMOGLOBIN GLYCOSYLATED A1C: CPT | Performed by: NURSE PRACTITIONER

## 2022-12-15 PROCEDURE — 90471 IMMUNIZATION ADMIN: CPT | Performed by: NURSE PRACTITIONER

## 2022-12-15 PROCEDURE — 80178 ASSAY OF LITHIUM: CPT | Performed by: NURSE PRACTITIONER

## 2022-12-15 PROCEDURE — 91313 COVID-19 VACCINE BIVALENT BOOSTER 18+ (MODERNA): CPT | Performed by: NURSE PRACTITIONER

## 2022-12-15 PROCEDURE — 80061 LIPID PANEL: CPT | Performed by: NURSE PRACTITIONER

## 2022-12-15 PROCEDURE — 99215 OFFICE O/P EST HI 40 MIN: CPT | Mod: 25 | Performed by: NURSE PRACTITIONER

## 2022-12-15 PROCEDURE — 0134A COVID-19 VACCINE BIVALENT BOOSTER 18+ (MODERNA): CPT | Performed by: NURSE PRACTITIONER

## 2022-12-15 RX ORDER — LISINOPRIL AND HYDROCHLOROTHIAZIDE 20; 25 MG/1; MG/1
1 TABLET ORAL DAILY
Status: CANCELLED | OUTPATIENT
Start: 2022-12-15

## 2022-12-15 RX ORDER — AMLODIPINE BESYLATE 10 MG/1
10 TABLET ORAL DAILY
Qty: 90 TABLET | Refills: 3 | Status: ON HOLD | OUTPATIENT
Start: 2022-12-15 | End: 2023-06-08

## 2022-12-15 ASSESSMENT — ENCOUNTER SYMPTOMS
PALPITATIONS: 0
CHEST TIGHTNESS: 0
ABDOMINAL PAIN: 0
SHORTNESS OF BREATH: 0
LIGHT-HEADEDNESS: 0
VOMITING: 0
ABDOMINAL DISTENTION: 0
FATIGUE: 0
MYALGIAS: 0
DYSPHORIC MOOD: 0
SLEEP DISTURBANCE: 0
WHEEZING: 0
DIARRHEA: 1
NAUSEA: 0
ARTHRALGIAS: 0
HEADACHES: 0
RHINORRHEA: 0
NERVOUS/ANXIOUS: 0
CONSTIPATION: 0
SORE THROAT: 0
NUMBNESS: 0
DIZZINESS: 0
COUGH: 0

## 2022-12-15 NOTE — PROGRESS NOTES
[facility]  [address]  [place] [address]  Phone: [phone]  Primary Provider: Becki Ley  Pre-op Performing Provider: BECKI LEY      PREOPERATIVE EVALUATION:  Today's date: 12/15/2022    Ayana Prasad is a 32 year old adult who presents for a preoperative evaluation.    Surgical Information:  Surgery/Procedure: MASTECTOMY, BILATERAL, SIMPLE, possible nipple grafts. OnQ  Surgery Location: [facility]  Surgeon: Riana  Surgery Date: 1/4/23  Time of Surgery: 1pm  Where patient plans to recover: Other: group home  Fax number for surgical facility: Note does not need to be faxed, will be available electronically in Epic.    Type of Anesthesia Anticipated: to be determined    Assessment & Plan     The proposed surgical procedure is considered INTERMEDIATE risk.    Female-to-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 person  Will obtain EKG here today.  Continue to follow with surgery  - EKG 12-lead complete w/read - Clinics    Pre-op exam  Will start on antihypertensive today, amlodipine.  Plan to return to clinic in 2 weeks for recheck of blood pressure.  Okay to proceed with surgery as long as blood pressure is controlled on recheck and lithium level is in normal range on recheck.   - EKG 12-lead complete w/read - Clinics    Benign essential hypertension  Blood pressure elevated today in clinic.  Has been on amlodipine in the past-stopped on own.  We will plan to restart amlodipine.  Drug to drug interaction with lisinopril and lithium-we will avoid.  Plan to return to clinic in 2 weeks for blood pressure recheck.  May proceed with surgery as long as blood pressure is controlled in 2 weeks on recheck  - Basic metabolic panel; Future  - Albumin Random Urine Quantitative with Creat Ratio; Future  - Hemoglobin A1c; Future  - amLODIPine (NORVASC) 10 MG tablet; Take 1 tablet (10 mg) by mouth daily    Prediabetes  Metformin causing diarrhea during the night.  No diarrhea  during the day.  Is currently tolerable.  Encouraged to take metformin with food.  - Hemoglobin A1c; Future    Hypertriglyceridemia  Taking Crestor/rosuvastatin and tolerating well.  Plan to recheck lipids here today  - Lipid panel reflex to direct LDL Fasting; Future    Schizoaffective disorder, bipolar type (H)  Will obtain EKG per psychiatrist request.  Check lithium level prior to surgery.  Continue to follow closely with psychiatry.  - Lithium level; Future      Risks and Recommendations:  The patient has the following additional risks and recommendations for perioperative complications:   - Consult Hospitalist / IM to assist with post-op medical management   - History of urinary retention  Diabetes:  - Patient is not on insulin therapy: regular NPO guidelines can be followed.     Medication Instructions:   - lithium: Check lithium level. Continue without modification.    - Psychostimulants: Hold the day of surgery  We will plan to hold metformin night prior to surgery due to causing diarrhea.    RECOMMENDATION:  Approval given to proceed with procedure as long as blood pressure is improved on recheck and lithium level in range on recheck.     Review of the result(s) of each unique test - Labs  Ordering of each unique test  Prescription drug management  60+ minutes spent on the date of the encounter doing chart review, history and exam, documentation and further activities per the note    Subjective     HPI related to upcoming procedure: Here today for preop exam.  Is going to be top surgery. Scheduled 1/4/23.  Currently living in [place] in a group home.    Has been on blood pressure meds in the past. Stopped taking all meds.  Does not feel like blood pressure is high.  Does have a strong family history of cardiovascular disease including high blood pressure.  Brother passed away at age 52 due to MI.  Paternal grandfather with MI in his 50s.    Needs EKG as requested by mental health to check for long  QT    No chance of pregnancy. Is not sexually active. Is not getting mentstrual cycle.  Due to taking testosterone.    Has not been using nicotine. No chewing or nicotine.     Taking metformin. Is waking nearly every night with loose stools. Will get up 2 times per night with diarrhea. Is able to go back to sleep. No diarrhea during the day.     1. No - Have you ever had a heart attack or stroke?  2. No - Have you ever had surgery on your heart or blood vessels, such as a stent, coronary (heart) bypass, or surgery on an artery in the head, neck, heart, or legs?  3. No - Do you have chest pain when you are physically active?  4. No - Do you have a history of heart failure?  5. No - Do you currently have a cold, bronchitis, or symptoms of other respiratory (head and chest) infections?  6. No - Do you have a cough, shortness of breath, or wheezing?  7. No - Do you or anyone in your family have a history of blood clots?  8. No - Do you or anyone in your family have a serious bleeding problem, such as long-lasting bleeding after surgeries or cuts?  9. No - Have you ever had anemia or been told to take iron pills?  10. No - Have you had any abnormal blood loss such as black, tarry or bloody stools, or abnormal vaginal bleeding?  11. No - Have you ever had a blood transfusion?  12. Yes - Are you willing to have a blood transfusion if it is medically needed before, during, or after your surgery?  13. Yes - Have you or anyone in your family ever had problems with anesthesia (sedation for surgery)? Mónica. Gets very nauseated with anesthetic. Has used scopolamine patch in the past to help decrease nausea.   14. No - Do you have sleep apnea, excessive snoring, or daytime drowsiness?   15. No - Do you have any artifical heart valves or other implanted medical devices, such as a pacemaker, defibrillator, or continuous glucose monitor?  16. No - Do you have any artifical joints?  17. No - Are you allergic to latex?  18.  No - Is there any chance that you may be pregnant?   Health Care Directive:  Patient does not have a Health Care Directive or Living Will:   Preoperative Review of :   reviewed - controlled substances reflected in medication list.      Status of Chronic Conditions:  DIABETES - Patient has a history of being prediabetic. Patient is being treated with oral agents and denies significant side effects. Control has been good. Complicating factors include but are not limited to: hypertension, hyperlipidemia and morbid obesity .     HYPERLIPIDEMIA - Patient has a long history of significant Hyperlipidemia requiring medication for treatment with recent good control. Patient reports no problems or side effects with the medication.     HYPERTENSION - Patient has longstanding history of HTN , currently denies any symptoms referable to elevated blood pressure. Specifically denies chest pain, palpitations, dyspnea, orthopnea, PND or peripheral edema. Blood pressure readings have not been in normal range. Current medication regimen is as listed below. Patient denies any side effects of medication.       Review of Systems   Constitutional: Negative for fatigue.   HENT: Negative for ear pain, rhinorrhea and sore throat.    Eyes: Negative for visual disturbance.   Respiratory: Negative for cough, chest tightness, shortness of breath and wheezing.    Cardiovascular: Negative for chest pain, palpitations and leg swelling.   Gastrointestinal: Positive for diarrhea. Negative for abdominal distention, abdominal pain, constipation, nausea and vomiting.   Endocrine: Negative for cold intolerance and heat intolerance.   Musculoskeletal: Negative for arthralgias and myalgias.   Skin: Negative for rash.   Neurological: Negative for dizziness, light-headedness, numbness and headaches.   Psychiatric/Behavioral: Negative for dysphoric mood and sleep disturbance. The patient is not nervous/anxious.        Patient Active Problem List     Diagnosis Date Noted     Morbid obesity (H) 08/29/2022     Priority: Medium     Female-to-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 person 03/21/2022     Priority: Medium     Hypertension, unspecified type 12/16/2021     Priority: Medium     Akathisia 12/09/2021     Priority: Medium     Schizoaffective disorder, chronic condition with acute exacerbation (H) 11/29/2021     Priority: Medium     Schizophrenia, schizoaffective, chronic with acute exacerbation (H) 11/29/2021     Priority: Medium     Bipolar affective disorder, mixed, severe, with psychotic behavior (H) 11/27/2021     Priority: Medium     Prediabetes 10/27/2021     Priority: Medium     Acanthosis nigricans 07/21/2021     Priority: Medium     Seizure-like activity (H) 03/23/2021     Priority: Medium     DUB (dysfunctional uterine bleeding) 03/01/2021     Priority: Medium     3/1/2021  Plan Documentation  Service ordered Depo Provera injection (150mg IM) may be given every 3 months for one year per protoccol.  Education by RN to be done in clinic.  topic injection teaching.   Plan and order should be renewed at a visit no later than March 1, 2022    Glenda Juan MD        Lumbosacral radiculopathy at L5 02/23/2021     Priority: Medium     Added automatically from request for surgery [id number]       Gender identity disorder 02/16/2021     Priority: Medium     Aggressive behavior 12/13/2020     Priority: Medium     AVA (generalized anxiety disorder) 11/16/2020     Priority: Medium     Chronic bilateral low back pain without sciatica 01/17/2020     Priority: Medium     Urinary retention 06/29/2019     Priority: Medium     Bella (H) 06/04/2019     Priority: Medium     Nausea 02/25/2019     Priority: Medium     Cyst of left ovary 11/05/2018     Priority: Medium     Nephrolithiasis 08/29/2018     Priority: Medium     Auditory hallucination 02/26/2018     Priority: Medium     Anxiety 01/05/2018     Priority: Medium     Schizoaffective disorder, bipolar type (H) 06/30/2017      Priority: Medium     PTSD (post-traumatic stress disorder) 06/30/2017     Priority: Medium     Cauda equina syndrome with neurogenic bladder (H) 01/20/2017     Priority: Medium     Moderate episode of recurrent major depressive disorder (H) 01/17/2017     Priority: Medium     Borderline personality disorder (H) 12/27/2016     Priority: Medium     History of heroin abuse (H) 12/27/2016     Priority: Medium     Optic neuritis 03/04/2016     Priority: Medium     From recent dx of optic neuritis w/ vision loss, and iritis. Received infusions x 4 of solumedrol at [facility]. MRI done of head as well which was normal. Spinal tap looked ok per patient.         Intractable back pain 09/20/2015     Priority: Medium     Depression 02/14/2015     Priority: Medium     Overdose 02/14/2015     Priority: Medium     Overview:   Intentional overdose October 2016 and May 2016       Cannabis dependence (H) 08/22/2013     Priority: Medium     Episodic mood disorder (H) 08/22/2013     Priority: Medium     Opioid use disorder, severe, dependence (H) 08/22/2013     Priority: Medium     Substance-induced psychotic disorder with hallucinations (H) 08/22/2013     Priority: Medium     GERD (gastroesophageal reflux disease) 07/08/2013     Priority: Medium     Tobacco abuse 07/08/2013     Priority: Medium     Marijuana abuse 05/31/2013     Priority: Medium     Daily.  Some use of MDMA and cocaine in past and recently had a 4 day break where she doesn't recall getting lost in woods.        Polysubstance abuse (H) 05/31/2013     Priority: Medium     Marijuana daily, Xanax periodically.  MDMA a couple times and cocaine. Narcotics and over the counter. Dextromethorphan with overdose 5/1/16 at [facility].  CD recommended.        Bipolar 1 disorder, manic, mild 01/13/2012     Priority: Medium     Close to meeting criteria for bipolar 1? Reji Dey.  Psychology feels bipolar may be appropriate diagnosis although subsequent evaluation by psychiatry  at [facility] felt depression with borderline personality.  Will return for med discussion with preference for Lithium 300 two times daily with goal 12 hour trough 0.8-1.2.  March 26, 2012 - intitiated Li and seemed to help some but stopped due to shakiness.  Lamotrigine trial as having more depression issues 25/d, up to two times daily then gradually increase to 100-200 mg daily.  Consider olazapine for thought disturbance. Has not wanted medications but used friends Xanax.  Prozac plus olazapine good next option once GI issues worked through. Patient very resistent to medications.  Continue with Reji.   May 31, 2013 - patient likely in a hypomanic/manic phase right now but no signs or symptoms of dangerous behaviors or thought processes.  Trial of olanzapine and fluoxetine. Didn't like olanzapine. Stopped as inpt for non-suicidal overdose at [facility]. Pyschiatry, psychology and continue with Prozac.  Now agreeing to take prozac and seroquel. Stopped Prozac on her own because didn't notice difference.  Trazodone didn't help. Stopped all. Possible haven?  Restart quetiapine for minor hallucinations.        ADHD (attention deficit hyperactivity disorder) 09/09/2011     Priority: Medium     Adderall ineffective.  Better on Concerta.  Still with anger issues predating medications. Declines counseling. Suggested vocational assessment.  Trial of guanfacine adjunct for anger but didn't help. Would avoid controlled substances given CD issues and impulsivity.        Past Medical History:   Diagnosis Date     ADHD (attention deficit hyperactivity disorder)      Bipolar 1 disorder      Borderline personality disorder      Cauda equina syndrome      Chronic low back pain      Depression      GERD (gastroesophageal reflux disease)      h/o TBI (traumatic brain injury)      Hypertension, unspecified type 12/16/2021     Marginal corneal ulcer, left 07/17/2015     Nephrolithiasis      obesity      Polysubstance abuse - methamphetamine,  hallucinagen, heroin, marijuana     currently in remission     PONV (postoperative nausea and vomiting)      PTSD (post-traumatic stress disorder)      Past Surgical History:   Procedure Laterality Date     BACK SURGERY  12/24/2016     BACK SURGERY - For Cauda Equina Syndrome  12/24/2016     COLONOSCOPY       COMBINED CYSTOSCOPY, INSERT STENT URETER(S) Left 8/30/2018    Procedure: COMBINED CYSTOSCOPY, INSERT STENT URETER(S);  Cystoscopy With Left Stent Placement;  Surgeon: Kiran Ulrich MD;  Location: [facility]     COMBINED CYSTOSCOPY, RETROGRADES, EXCHANGE STENT URETER(S) Left 10/14/2018    Procedure: COMBINED CYSTOSCOPY, RETROGRADES, EXCHANGE STENT URETER(S);  Cystoscopy and removal of left-sided stent.;  Surgeon: Stiven Olivo MD;  Location:  OR     COMBINED CYSTOSCOPY, RETROGRADES, URETEROSCOPY, INSERT STENT  1/6/2014    Procedure: COMBINED CYSTOSCOPY, RETROGRADES, URETEROSCOPY, INSERT STENT;  Cystyoscopy place left ureteral stent;  Surgeon: Jaun Kimble MD;  Location: [facility] OR     COMBINED CYSTOSCOPY, RETROGRADES, URETEROSCOPY, INSERT STENT Left 10/23/2018    Procedure: Video cystoscopy, left ureteroscopy with stone extraction;  Surgeon: Bull Mast MD;  Location:  OR     CYSTOSCOPY, URETEROSCOPY, COMBINED Right 9/23/2015    Procedure: COMBINED CYSTOSCOPY, URETEROSCOPY;  Surgeon: ROME Jett MD;  Location: [facility] OR     ENT SURGERY       ESOPHAGOSCOPY, GASTROSCOPY, DUODENOSCOPY (EGD), COMBINED  4/8/2013    Procedure: COMBINED ESOPHAGOSCOPY, GASTROSCOPY, DUODENOSCOPY (EGD), BIOPSY SINGLE OR MULTIPLE;  Gastroscopy;  Surgeon: Peter Pickard MD;  Location: [facility] GI     ESOPHAGOSCOPY, GASTROSCOPY, DUODENOSCOPY (EGD), COMBINED Left 10/28/2014    Procedure: COMBINED ESOPHAGOSCOPY, GASTROSCOPY, DUODENOSCOPY (EGD), BIOPSY SINGLE OR MULTIPLE;  Surgeon: Narcisa Ramirez MD;  Location:  OR     ESOPHAGOSCOPY, GASTROSCOPY, DUODENOSCOPY (EGD), COMBINED N/A 12/24/2018    Procedure:  COMBINED ESOPHAGOSCOPY, GASTROSCOPY, DUODENOSCOPY (EGD), BIOPSY SINGLE OR MULTIPLE;  Surgeon: Sonu Verduzco MD;  Location: [facility] GI     INJECT EPIDURAL TRANSFORAMINAL LUMBAR / SACRAL EA ADDITIONAL LEVEL Left 3/18/2021    Procedure: Left L5/S1 transforaminal injection for selective L5 nerve root block;  Surgeon: Eliza Pagan MD;  Location: [facility] OR     LAPAROSCOPIC CHOLECYSTECTOMY  11/20/2014    [facility], Dr. Ramirez     LASER HOLMIUM LITHOTRIPSY URETER(S), INSERT STENT, COMBINED  4/2/2014    Procedure: COMBINED CYSTOSCOPY, URETEROSCOPY, LASER HOLMIUM LITHOTRIPSY URETER(S), INSERT STENT;  Cystoscopy,Left Ureteral Stent Removal,Left Ureteroscopy with Laser Lithotripsy,Left Ureter Stent Placement;  Surgeon: ROME Jett MD;  Location: [facility] OR     Transurethral stone resection  03/11/2011     Current Outpatient Medications   Medication Sig Dispense Refill     amphetamine-dextroamphetamine (ADDERALL XR) 15 MG 24 hr capsule Take 1 capsule (15 mg) by mouth every morning 30 capsule 0     aspirin-acetaminophen-caffeine (EXCEDRIN MIGRAINE) 250-250-65 MG tablet Take 1 tablet by mouth daily as needed for headaches 30 tablet 0     benzoyl peroxide 5 % external liquid Apply topically daily 226 g 11     brexpiprazole (REXULTI) 1 MG tablet Take 1 tablet (1 mg) by mouth daily 30 tablet 1     doxycycline monohydrate (MONODOX) 100 MG capsule Take 1 capsule (100 mg) by mouth 2 times daily 60 capsule 4     famotidine (PEPCID) 20 MG tablet Take 1 tablet (20 mg) by mouth At Bedtime 90 tablet 3     gabapentin (NEURONTIN) 300 MG capsule Take 3 capsules (900 mg) by mouth 3 times daily 270 capsule 1     lithium ER (LITHOBID) 300 MG CR tablet Take 3 tablets (900 mg) by mouth At Bedtime 90 tablet 2     metaxalone (SKELAXIN) 800 MG tablet Take 0.5-1 tablets (400-800 mg) by mouth 3 times daily as needed for moderate pain 60 tablet 1     metFORMIN (GLUCOPHAGE XR) 500 MG 24 hr tablet Take 2 tablets (1,000 mg) by mouth daily (with  "dinner) 180 tablet 0     needle, disp, 18G X 1\" MISC Use to draw up weekly testosterone dose 10 each 1     Needle, Disp, 25G X 5/8\" MISC Use to inject weekly Testosterone dose 10 each 1     nicotine (NICORETTE) 2 MG gum Place 1 each (2 mg) inside cheek every hour as needed for smoking cessation 100 each 1     OLANZapine (ZYPREXA) 20 MG tablet Take 1 tablet (20 mg) by mouth At Bedtime 30 tablet 0     propranolol (INDERAL) 10 MG tablet Take 1 tablet (10 mg) by mouth 2 times daily as needed (tremor and anxiety) 60 tablet 1     rosuvastatin (CRESTOR) 10 MG tablet Take 1 tablet (10 mg) by mouth daily 90 tablet 3     syringe, disposable, 1 ML MISC Use to draw up and administer weekly testosterone dose 10 each 1     testosterone cypionate (DEPOTESTOSTERONE) 200 MG/ML injection Inject 0.1 mLs (20 mg) Subcutaneous once a week 5 mL 0     tretinoin (RETIN-A) 0.025 % external cream Apply topically At Bedtime Pea-sized amount to whole face 45 g 3       Allergies   Allergen Reactions     Haldol [Haloperidol] Other (See Comments)     Makes patient very angry and anxious     Adhesive Tape Hives     Percocet [Oxycodone-Acetaminophen] Nausea and Vomiting     Prednisone Other (See Comments) and Hives     Suicidal ideation     Risperidone Other (See Comments)     Tramadol Hcl Nausea and Vomiting     Droperidol Anxiety     Seroquel [Quetiapine] Palpitations     Spent 2 weeks in the hospital due to having seroquel, caused palpitations and QT prolongation        Social History     Tobacco Use     Smoking status: Former     Packs/day: 0.00     Years: 5.00     Pack years: 0.00     Types: Dip, chew, snus or snuff, Other, Cigars, Cigarettes     Start date: 2011     Quit date: 2022     Years since quittin.2     Smokeless tobacco: Former     Types: Chew     Quit date: 2019   Substance Use Topics     Alcohol use: No     History   Drug Use Unknown     Comment: oxy, heroin (smoke)         Objective     There were no vitals taken " for this visit.    Physical Exam  Constitutional:       Appearance: He is well-developed and well-nourished.   HENT:      Right Ear: Tympanic membrane and external ear normal.      Left Ear: Tympanic membrane and external ear normal.      Mouth/Throat:      Mouth: Oropharynx is clear and moist and mucous membranes are normal.      Pharynx: Uvula midline.   Neck:      Thyroid: No thyromegaly.      Vascular: No carotid bruit.   Cardiovascular:      Rate and Rhythm: Normal rate and regular rhythm.      Heart sounds: Normal heart sounds.   Pulmonary:      Effort: Pulmonary effort is normal.      Breath sounds: Normal breath sounds.   Abdominal:      General: Bowel sounds are normal.      Palpations: Abdomen is soft.   Musculoskeletal:         General: No edema.   Skin:     General: Skin is warm and dry.   Neurological:      Mental Status: He is alert.   Psychiatric:         Mood and Affect: Mood and affect normal.         Recent Labs     Recent Labs   Lab Test 12/15/22  0911 08/29/22  1558    140   POTASSIUM 3.8 4.1   CHLORIDE 108 111*   CO2 19* 23   ANIONGAP 9 6   * 89   BUN 18 8   CR 0.73 0.62   WILLIAMS 10.3* 9.3     Lab Results   Component Value Date    WBC 9.2 08/16/2022    WBC 13.1 05/19/2021     Lab Results   Component Value Date    RBC 4.78 08/16/2022    RBC 4.95 05/19/2021     Lab Results   Component Value Date    HGB 13.8 08/16/2022    HGB 13.6 05/19/2021     Lab Results   Component Value Date    HCT 41.6 08/16/2022    HCT 41.6 05/19/2021     No components found for: MCT  Lab Results   Component Value Date    MCV 87 08/16/2022    MCV 84 05/19/2021     Lab Results   Component Value Date    MCH 28.9 08/16/2022    MCH 27.5 05/19/2021     Lab Results   Component Value Date    MCHC 33.2 08/16/2022    MCHC 32.7 05/19/2021     Lab Results   Component Value Date    RDW 13.3 08/16/2022    RDW 14.9 05/19/2021     Lab Results   Component Value Date     08/16/2022     05/19/2021     Lab Results    Component Value Date    A1C 6.5 12/15/2022    A1C 6.4 08/29/2022    A1C 5.9 10/06/2021    A1C 5.8 03/19/2021    A1C 5.8 11/05/2018    A1C 5.8 10/03/2017     LITHIUM 12/15/2022-1.4, elevated     Diagnostics:     EKG: sinus tachycardia, normal axis, normal intervals, no acute ST/T changes c/w ischemia, no LVH by voltage criteria, unchanged from previous tracings    Revised Cardiac Risk Index (RCRI):  The patient has the following serious cardiovascular risks for perioperative complications:   - No serious cardiac risks = 0 points     RCRI Interpretation: 0 points: Class I (very low risk - 0.4% complication rate)           Signed Electronically by: BROOKLYN Cruz CNP  Copy of this evaluation report is provided to requesting physician.

## 2022-12-15 NOTE — TELEPHONE ENCOUNTER
Upon chart review, it appears that this was resolved yesterday. Sent patient [employer] message to confirm.

## 2022-12-15 NOTE — TELEPHONE ENCOUNTER
Phone call to Prakash, who feels ready to discharge and reported learning and applying many skills. Prakash requested to discharge, so today is the last day.  Coordinated care for completing  Treatment plans.    John Ling D,  Licensed Clinical Psychologist

## 2022-12-15 NOTE — TELEPHONE ENCOUNTER
Phone call to .  Andrea Phoenix: [phone]  Fax: [phone]   and a message was left to explain that Prakash will discharge today.  Coordinated care with staff and , who has the program phone number.      295.70  (F25) Schizoaffective Disorder Bipolar Type.  300.02 (F41.1) Generalized Anxiety Disorder  309.81 (F43.10) Posttraumatic Stress Disorder (includes Posttraumatic Stress Disorder for Children 6 Years and Younger)  Without dissociative symptoms.  314.01 (F90.9) Unspecified Attention -Deficit / Hyperactivity Disorder         Psychiatry: Murali Gonzalez, CNP, [facility]  Therapy: Joshua Mann, [facility]  :  Mental Health Resources: Andrea Phoenix: [phone]  Fax: [phone]  [place] Primary Care Provider: Dr. Becki Avery  ARM:  [facility]          John Ling, ROSALINA,  Licensed Clinical Psychologist

## 2022-12-15 NOTE — PATIENT INSTRUCTIONS
Do not take your Adderall the day of your surgery.    Hold your metformin the night before surgery due to it causing you looser stools.    No cannabis/marijuana the morning of your surgery.

## 2022-12-16 DIAGNOSIS — M51.369 DDD (DEGENERATIVE DISC DISEASE), LUMBAR: ICD-10-CM

## 2022-12-16 RX ORDER — GABAPENTIN 300 MG/1
900 CAPSULE ORAL 3 TIMES DAILY
Qty: 270 CAPSULE | Refills: 1 | Status: SHIPPED | OUTPATIENT
Start: 2022-12-16 | End: 2023-02-08

## 2022-12-16 NOTE — TELEPHONE ENCOUNTER
Received fax request from [facility] requesting refill(s) for gabapentin (NEURONTIN) 300 MG capsule    Last refilled on 11/23/22    Pt last seen on 07/22/22  Next appt scheduled for : none    Will facilitate refill.

## 2022-12-17 NOTE — TELEPHONE ENCOUNTER
Signed Prescriptions:                        Disp   Refills    gabapentin (NEURONTIN) 300 MG capsule      270 ca*1        Sig: Take 3 capsules (900 mg) by mouth 3 times daily  Authorizing Provider: TODD BUSTOS, RN CNP, FNP  [facility]

## 2022-12-19 ENCOUNTER — TELEPHONE (OUTPATIENT)
Dept: PSYCHIATRY | Facility: CLINIC | Age: 32
End: 2022-12-19

## 2022-12-19 ENCOUNTER — DOCUMENTATION ONLY (OUTPATIENT)
Dept: PSYCHIATRY | Facility: CLINIC | Age: 32
End: 2022-12-19

## 2022-12-19 ENCOUNTER — MYC MEDICAL ADVICE (OUTPATIENT)
Dept: PSYCHIATRY | Facility: CLINIC | Age: 32
End: 2022-12-19

## 2022-12-19 DIAGNOSIS — Z79.899 MEDICATION MANAGEMENT: Primary | ICD-10-CM

## 2022-12-19 DIAGNOSIS — F25.0 SCHIZOAFFECTIVE DISORDER, BIPOLAR TYPE (H): ICD-10-CM

## 2022-12-19 DIAGNOSIS — F25.0 SCHIZOAFFECTIVE DISORDER, BIPOLAR TYPE (H): Primary | ICD-10-CM

## 2022-12-19 RX ORDER — LITHIUM CARBONATE 300 MG/1
600 TABLET, FILM COATED, EXTENDED RELEASE ORAL AT BEDTIME
Qty: 60 TABLET | Refills: 2 | Status: SHIPPED | OUTPATIENT
Start: 2022-12-19 | End: 2023-02-09

## 2022-12-19 NOTE — TELEPHONE ENCOUNTER
Spoke with patient to verify that they will take 600 mg and get labs redrawn in 5 days. Will also call to remind them to get labs drawn.

## 2022-12-19 NOTE — TELEPHONE ENCOUNTER
Received call from patient notifying EKG result are in and patient is wondering if mattie has had a chance to look at them yet. Patient also is wondering if mattie still wants patient to send over BPs 2x per week since they were now started on BP medications.   Message sent to provider.

## 2022-12-19 NOTE — TELEPHONE ENCOUNTER
Spoke to patient and he is having diarrhea 5 to 6 times a day. He states that he vomits mostly  Patient states that he has had these symptoms for a couple of weeks.

## 2022-12-19 NOTE — PROGRESS NOTES
Recent lithium level 1.4 on 12/15. Cr WNL level. Lithium level 0.6 on 5/27/2022. Lithium dose has not changed over a year. Last level Nursing staff contacted pt, pt is having diarrhea x5-6 and vomiting x2 for couple weeks.  Pt thought this is due to Metformin. On 12/15, pt had PCP visit and Metformin was changed to XR.  Pt was instructed to reduce lithium to 600 mg daily for now and repeat lithium trough lab in 5 days.  Lithium level ordered. Also epic messaged PCP to see if any other medication can be used for his DM as pt is having significant diarrhea and vomiting that are causing lithium toxicity. Pt has tried numerous medications and lithium seemed to stabilized him. Regine Last, CNP, 12/19/2022

## 2022-12-22 NOTE — TELEPHONE ENCOUNTER
Called patient to remind him of lab draw and he is out of town and cannot get it done until Tuesday or Wednesday next week. Provider updated.

## 2022-12-27 ENCOUNTER — MYC MEDICAL ADVICE (OUTPATIENT)
Dept: FAMILY MEDICINE | Facility: CLINIC | Age: 32
End: 2022-12-27

## 2022-12-27 ENCOUNTER — OFFICE VISIT (OUTPATIENT)
Dept: PLASTIC SURGERY | Facility: CLINIC | Age: 32
End: 2022-12-27
Payer: COMMERCIAL

## 2022-12-27 VITALS
HEART RATE: 107 BPM | HEIGHT: 65 IN | WEIGHT: 240 LBS | DIASTOLIC BLOOD PRESSURE: 88 MMHG | OXYGEN SATURATION: 97 % | TEMPERATURE: 98.2 F | SYSTOLIC BLOOD PRESSURE: 133 MMHG | BODY MASS INDEX: 39.99 KG/M2

## 2022-12-27 DIAGNOSIS — F64.0 GENDER DYSPHORIA IN ADULT: Primary | ICD-10-CM

## 2022-12-27 PROCEDURE — 99212 OFFICE O/P EST SF 10 MIN: CPT | Performed by: SURGERY

## 2022-12-27 ASSESSMENT — PAIN SCALES - GENERAL: PAINLEVEL: MILD PAIN (2)

## 2022-12-27 NOTE — NURSING NOTE
"Chief Complaint   Patient presents with     LULU Randall, is being sen today for a pre-op DOS 1/4/23.       Vitals:    12/27/22 1035   BP: 133/88   BP Location: Left arm   Patient Position: Chair   Cuff Size: Adult Large   Pulse: 107   Temp: 98.2  F (36.8  C)   TempSrc: Oral   SpO2: 97%   Weight: 108.9 kg (240 lb)   Height: 1.66 m (5' 5.35\")       Body mass index is 39.51 kg/m .      Kristin Meier LPN    "

## 2022-12-27 NOTE — TELEPHONE ENCOUNTER
Called Prakash, no answer. Left VM asking for call back or MyChart message regarding if they were completing labs today or tomorrow. Reminded them that Lithium level must be drawn 12 hours after taking last dose of Lithium.

## 2022-12-27 NOTE — LETTER
"12/27/2022       RE: Ayana Prasad  [address]  [place] [address]     Dear Colleague,    Thank you for referring your patient, Ayana Prasad, to the [facility] [place] at [facility]. Please see a copy of my visit note below.    PLASTICS PRE-OP  This is a 32 year old biological female transitioning to male who presents for his pre-op visit prior to bilateral simple mastectomy with nipple graft reconstruction scheduled for 1/4/23. Last seen by me 9/14/2021. He is here today by himself. He lives at a group home due to schizoaffective diagnosis and plans to have a roommate drive him to and from surgery. The owner of the  is an RN and can help him with his postop cares. The patient has had a negative mammogram, and his letter of support from Dora Mazariegos was reportedly received via [employer] some time ago. History and physical were done last week, but needs follow up with PCP to check for BP and lithium levels. COVID test can be done as home test at .      Prakash stopped using nicotine in his vape over 1 month ago. He is using more cannabis now. Had \"jittery\" legs during entire visit.     My LPN, Kristin, discussed periop instructions with the patient including: not eating anything 8 hours prior to surgery, drinking clear liquids up to 2 hours before surgery except for morning medications with a sip of water, the preop shower with surgical soap which was given, and wearing a button- or zip-up shirt on the day of surgery. The patient was also instructed to avoid NSAIDs x 1 week both before AND after surgery, but he may take Tylenol post-op for pain as needed. The patient was provided with a folder of information on flako-op topics, including where the surgery will be.     I discussed the following with the patient; preop, intraop and postop phases of care on the day of surgery, the placement of a bladder " catheter during surgery that will likely be removed in recovery, postop cares and limitations with relation to home and work settings, 5-lb weight restriction for the first 3 weeks postop, and how long to maintain limited activities. We also discussed Zofran, oxycodone, Z-jim, and antipruritics which will be prescribed. We discussed that preventing constipation will be their responsibility, and we discussed methods such as aloe, prune juice or Miralax.     In addition, we went over the possible risks and complications involved with this elective procedure. These include but are not limited to: infection, bleeding, hematoma/seroma formation, and poor healing (including dehiscence, nipple graft loss, or hypertrophic scarring). We also discussed the possibility of altered chest sensation (either hypo or hypersensitive), residual deformities and asymmetries, possible further surgical revisions, and possible injury to surrounding neurovascular and musculoskeletal structures, including intra-axillary or intra-thoracic. We lastly discussed anesthetic risks including DVT/PE or cardiopulmonary events.      All questions were answered. Prakash will bring any other questions that arise to the day of surgery.    Total time = 15 minutes, spent on the date of encounter doing chart review, history and physical, dressing changes, documentation, patient education, and any further activity as noted above.       Sincerely,    Pretty Mayers MD

## 2022-12-27 NOTE — TELEPHONE ENCOUNTER
"Patient report having moderate blood (bright red in color) after wiping. Denies clots. Believes this is irritation due to wiping so frequently. Stool is \"water\". Patient reports staying hydrated, today \"5 water bottles\".  Stomach cramping comes and goes (prior to having BM and right after) moderate.     Did not eat dinner last night because of nausea.   Reports constant diarrhea for 1 week. The only day that he has not eaten with metformin was last night.     Declines weakness/faint/fever/rapid or labored breathing.     Patient reports eating big dinner meals with metformin (normally).     RN encouraged eating bland diet with recommendation of this. Patient agreed.     RN encouraged ER / UC with severe symptoms. Patient verbalized acknowledgment.     RN routing to covering provider team     Disp Refills Start End TALHA   metFORMIN (GLUCOPHAGE XR) 500 MG 24 hr tablet 180 tablet 0 12/7/2022  No   Sig - Route: Take 2 tablets (1,000 mg) by mouth daily (with dinner) - Oral         Neeru Muro RN on 12/27/2022 at 2:18 PM        "

## 2022-12-28 ENCOUNTER — TELEPHONE (OUTPATIENT)
Dept: PSYCHIATRY | Facility: CLINIC | Age: 32
End: 2022-12-28

## 2022-12-28 DIAGNOSIS — R19.7 DIARRHEA, UNSPECIFIED TYPE: Primary | ICD-10-CM

## 2022-12-28 NOTE — TELEPHONE ENCOUNTER
[facility] Call Center    Phone Message    May a detailed message be left on voicemail: yes     Reason for Call: Other: Pt is calling back to speak with the nurse regarding a call/MyChart msg he got this morning.      Action Taken: Other: [facility] psych pool    Travel Screening: Not Applicable

## 2022-12-28 NOTE — TELEPHONE ENCOUNTER
Writer spoke with Prakash. He reports that he's still out of town and won't be able to get labs done this week. He will be back next week for surgery scheduled on 1/4/23 and states he will complete it then.   Reports no mood changes since decrease in Lithium and denies any safety concerns. Denies SI/SIB. Denies any physical symptoms or concerns and agrees to contact clinic if needed.

## 2022-12-28 NOTE — TELEPHONE ENCOUNTER
Writer called to check in with Prakash. No answer. Left VM asking for return call to clinic and that we wanted to see when he was getting the Lithium lab draw.

## 2023-01-02 ENCOUNTER — TELEPHONE (OUTPATIENT)
Dept: FAMILY MEDICINE | Facility: CLINIC | Age: 33
End: 2023-01-02

## 2023-01-02 NOTE — TELEPHONE ENCOUNTER
Scheduled for surgery on 1/4/23. Was to return on 12/29/22 for recheck of labs (lithium, cbc, bmp and stool studies) and recheck of blood pressure after starting Norvasc/amlodipine. Needs to have both of these done before will be cleared for surgery. Please call and assist patient in scheduling today or tomorrow otherwise will not be able to have surgery on 1/4/23.    Becki CARVALHOC

## 2023-01-03 NOTE — PROGRESS NOTES
"PLASTICS PRE-OP  This is a 32 year old biological female transitioning to male who presents for his pre-op visit prior to bilateral simple mastectomy with nipple graft reconstruction scheduled for 1/4/23. Last seen by me 9/14/2021. He is here today by himself. He lives at a group home due to schizoaffective diagnosis and plans to have a roommate drive him to and from surgery. The owner of the  is an RN and can help him with his postop cares. The patient has had a negative mammogram, and his letter of support from Dora Mazariegos was reportedly received via [employer] some time ago. History and physical were done last week, but needs follow up with PCP to check for BP and lithium levels. COVID test can be done as home test at .      Prakash stopped using nicotine in his vape over 1 month ago. He is using more cannabis now. Had \"jittery\" legs during entire visit.     My LPN, Kristin, discussed periop instructions with the patient including: not eating anything 8 hours prior to surgery, drinking clear liquids up to 2 hours before surgery except for morning medications with a sip of water, the preop shower with surgical soap which was given, and wearing a button- or zip-up shirt on the day of surgery. The patient was also instructed to avoid NSAIDs x 1 week both before AND after surgery, but he may take Tylenol post-op for pain as needed. The patient was provided with a folder of information on flako-op topics, including where the surgery will be.     I discussed the following with the patient; preop, intraop and postop phases of care on the day of surgery, the placement of a bladder catheter during surgery that will likely be removed in recovery, postop cares and limitations with relation to home and work settings, 5-lb weight restriction for the first 3 weeks postop, and how long to maintain limited activities. We also discussed Zofran, oxycodone, Z-jim, and antipruritics which will be prescribed. We discussed that " preventing constipation will be their responsibility, and we discussed methods such as aloe, prune juice or Miralax.     In addition, we went over the possible risks and complications involved with this elective procedure. These include but are not limited to: infection, bleeding, hematoma/seroma formation, and poor healing (including dehiscence, nipple graft loss, or hypertrophic scarring). We also discussed the possibility of altered chest sensation (either hypo or hypersensitive), residual deformities and asymmetries, possible further surgical revisions, and possible injury to surrounding neurovascular and musculoskeletal structures, including intra-axillary or intra-thoracic. We lastly discussed anesthetic risks including DVT/PE or cardiopulmonary events.      All questions were answered. Prakash will bring any other questions that arise to the day of surgery.    Total time = 15 minutes, spent on the date of encounter doing chart review, history and physical, dressing changes, documentation, patient education, and any further activity as noted above.

## 2023-01-10 ENCOUNTER — TELEPHONE (OUTPATIENT)
Dept: PSYCHIATRY | Facility: CLINIC | Age: 33
End: 2023-01-10

## 2023-01-10 NOTE — TELEPHONE ENCOUNTER
Called and spoke to patient and he states that he is going to get lab checked tomorrow. He states that he is feeling good and denies any symptoms. He states that his mood has been good. No issues. Patient is currently taking 600 mg at bedtime. Reminded patient to get labs done exactly 12 hours from last dose.     ==> Lithium Excess Assessment <==    Tired or sedated: no  Hand tremor: no  Having to go to the bathroom a lot: no  Increased appetite: no  Nausea, vomiting, loose stools: no - resolved since stopping metformin   Gait unsteadiness: no  Worsening cognition: no    Normal: Cr=0.57-1.11. Gfr>60  Recent Labs   Lab Test 12/15/22  0911 08/29/22  1558 08/16/22  1259   CR 0.73 0.62 0.67   GFRESTIMATED >90 >90 >90

## 2023-01-12 ENCOUNTER — TELEPHONE (OUTPATIENT)
Dept: FAMILY MEDICINE | Facility: CLINIC | Age: 33
End: 2023-01-12

## 2023-01-12 ENCOUNTER — MYC MEDICAL ADVICE (OUTPATIENT)
Dept: PSYCHIATRY | Facility: CLINIC | Age: 33
End: 2023-01-12

## 2023-01-12 DIAGNOSIS — F99 INSOMNIA DUE TO OTHER MENTAL DISORDER: ICD-10-CM

## 2023-01-12 DIAGNOSIS — F41.9 ANXIETY: ICD-10-CM

## 2023-01-12 DIAGNOSIS — R25.1 TREMOR: ICD-10-CM

## 2023-01-12 DIAGNOSIS — F51.05 INSOMNIA DUE TO OTHER MENTAL DISORDER: ICD-10-CM

## 2023-01-12 DIAGNOSIS — F25.0 SCHIZOAFFECTIVE DISORDER, BIPOLAR TYPE (H): ICD-10-CM

## 2023-01-12 DIAGNOSIS — F90.2 ATTENTION DEFICIT HYPERACTIVITY DISORDER (ADHD), COMBINED TYPE: ICD-10-CM

## 2023-01-12 RX ORDER — PROPRANOLOL HYDROCHLORIDE 10 MG/1
10 TABLET ORAL 2 TIMES DAILY PRN
Qty: 60 TABLET | Refills: 1 | Status: SHIPPED | OUTPATIENT
Start: 2023-01-12 | End: 2023-03-16

## 2023-01-12 RX ORDER — OLANZAPINE 20 MG/1
20 TABLET ORAL AT BEDTIME
Qty: 30 TABLET | Refills: 0 | Status: SHIPPED | OUTPATIENT
Start: 2023-01-12 | End: 2023-02-09

## 2023-01-12 NOTE — TELEPHONE ENCOUNTER
"[facility] Call Center    Phone Message    May a detailed message be left on voicemail: yes     Reason for Call: Medication Refill Request    Has the patient contacted the pharmacy for the refill? Yes   Name of medication being requested: amphetamine-dextroamphetamine (ADDERALL XR) 15 MG 24 hr capsule  Provider who prescribed the medication: Regine Last  Pharmacy: Pt stated \"the one that was used last time.\"  Date medication is needed: 1-2 days left    Pt had an appt today, but provider out sick. Pt was rescheduled for 2/09/23 @ 2:30pm and asked for RN to follow up with them regarding refills.      Action Taken: Message routed to:  Other: PSYCHIATRY NURSE[facility]    Travel Screening: Not Applicable                                                                        "

## 2023-01-12 NOTE — TELEPHONE ENCOUNTER
Last seen: 12/14/22  RTC: 4 weeks   Cancel: 1/12 provider out   No-show: none  Next appt: 02/09/23     Incoming refill from Patient via mychart    Medication requested:   Pending Prescriptions:                       Disp   Refills    amphetamine-dextroamphetamine (ADDERALL X*30 cap*0            Sig: Take 1 capsule (15 mg) by mouth every morning    OLANZapine (ZYPREXA) 20 MG tablet         30 tab*0            Sig: Take 1 tablet (20 mg) by mouth At Bedtime    propranolol (INDERAL) 10 MG tablet        60 tab*1            Sig: Take 1 tablet (10 mg) by mouth 2 times daily as           needed (tremor and anxiety)        Last refill per         Medication sent to provider for review.

## 2023-01-12 NOTE — TELEPHONE ENCOUNTER
Date forms received: 01/12/2023  Form completed as much as possible by Benita Shipman.  Forms placed: in providers in basket Date placed: 01/12/2023  Benita Shipman

## 2023-01-13 ENCOUNTER — TELEPHONE (OUTPATIENT)
Dept: PSYCHIATRY | Facility: CLINIC | Age: 33
End: 2023-01-13

## 2023-01-13 DIAGNOSIS — F90.2 ATTENTION DEFICIT HYPERACTIVITY DISORDER (ADHD), COMBINED TYPE: Primary | ICD-10-CM

## 2023-01-13 RX ORDER — DEXTROAMPHETAMINE SACCHARATE, AMPHETAMINE ASPARTATE MONOHYDRATE, DEXTROAMPHETAMINE SULFATE AND AMPHETAMINE SULFATE 2.5; 2.5; 2.5; 2.5 MG/1; MG/1; MG/1; MG/1
10 CAPSULE, EXTENDED RELEASE ORAL DAILY
Qty: 30 CAPSULE | Refills: 0 | Status: SHIPPED | OUTPATIENT
Start: 2023-01-13 | End: 2023-02-09

## 2023-01-13 RX ORDER — DEXTROAMPHETAMINE SACCHARATE, AMPHETAMINE ASPARTATE MONOHYDRATE, DEXTROAMPHETAMINE SULFATE AND AMPHETAMINE SULFATE 3.75; 3.75; 3.75; 3.75 MG/1; MG/1; MG/1; MG/1
15 CAPSULE, EXTENDED RELEASE ORAL EVERY MORNING
Qty: 30 CAPSULE | Refills: 0 | OUTPATIENT
Start: 2023-01-13

## 2023-01-13 NOTE — TELEPHONE ENCOUNTER
Writer called pharmacy to ensure that any remaining 15 mg Adderall prescriptions were canceled. Pharmacy staff confirmed there were no remaining scripts, but informed writer that they do not have 10 mg Adderall in stock and were planning to inform patient of this.

## 2023-01-13 NOTE — TELEPHONE ENCOUNTER
Received call from patient. Patient reports he scheduled labs with his PCP, but states that the lab is scheduled at 12:20PM and he takes his lithium at 4:00PM every evening.     Patient states that transportation is a barrier, but states his  at the group home is able to bring him.     Patient also states pharmacy is able to fill his 10 mg Adderall today.

## 2023-01-13 NOTE — TELEPHONE ENCOUNTER
Patient called to say he also has some lab draws from his PCP and plans to schedule these with his [place] lab on Tuesday and plans to ensure the labs are drawn 12 hours after his Monday night dose.

## 2023-01-16 ENCOUNTER — TELEPHONE (OUTPATIENT)
Dept: PLASTIC SURGERY | Facility: CLINIC | Age: 33
End: 2023-01-16

## 2023-01-16 ENCOUNTER — LAB (OUTPATIENT)
Dept: LAB | Facility: CLINIC | Age: 33
End: 2023-01-16
Payer: COMMERCIAL

## 2023-01-16 DIAGNOSIS — R19.7 DIARRHEA, UNSPECIFIED TYPE: ICD-10-CM

## 2023-01-16 DIAGNOSIS — Z78.9 FEMALE-TO-MALE TRANSGENDER PERSON: ICD-10-CM

## 2023-01-16 DIAGNOSIS — R73.03 PREDIABETES: ICD-10-CM

## 2023-01-16 DIAGNOSIS — Z79.899 MEDICATION MANAGEMENT: ICD-10-CM

## 2023-01-16 DIAGNOSIS — F25.0 SCHIZOAFFECTIVE DISORDER, BIPOLAR TYPE (H): ICD-10-CM

## 2023-01-16 DIAGNOSIS — Z01.818 PRE-OP EXAM: ICD-10-CM

## 2023-01-16 DIAGNOSIS — K21.9 GASTROESOPHAGEAL REFLUX DISEASE WITHOUT ESOPHAGITIS: ICD-10-CM

## 2023-01-16 DIAGNOSIS — E78.1 HYPERTRIGLYCERIDEMIA: ICD-10-CM

## 2023-01-16 DIAGNOSIS — I10 BENIGN ESSENTIAL HYPERTENSION: ICD-10-CM

## 2023-01-16 LAB
ANION GAP SERPL CALCULATED.3IONS-SCNC: 9 MMOL/L (ref 3–14)
BASOPHILS # BLD AUTO: 0.1 10E3/UL (ref 0–0.2)
BASOPHILS NFR BLD AUTO: 1 %
BUN SERPL-MCNC: 15 MG/DL (ref 7–30)
CALCIUM SERPL-MCNC: 10.2 MG/DL (ref 8.5–10.1)
CHLORIDE BLD-SCNC: 112 MMOL/L (ref 94–109)
CO2 SERPL-SCNC: 23 MMOL/L (ref 20–32)
CREAT SERPL-MCNC: 0.62 MG/DL (ref 0.52–1.25)
EOSINOPHIL # BLD AUTO: 0.3 10E3/UL (ref 0–0.7)
EOSINOPHIL NFR BLD AUTO: 3 %
ERYTHROCYTE [DISTWIDTH] IN BLOOD BY AUTOMATED COUNT: 13.2 % (ref 10–15)
GFR SERPL CREATININE-BSD FRML MDRD: >90 ML/MIN/1.73M2
GLUCOSE BLD-MCNC: 203 MG/DL (ref 70–99)
HCT VFR BLD AUTO: 42.1 % (ref 35–53)
HGB BLD-MCNC: 13.7 G/DL (ref 11.7–17.7)
IMM GRANULOCYTES # BLD: 0.1 10E3/UL
IMM GRANULOCYTES NFR BLD: 1 %
LITHIUM SERPL-SCNC: 0.4 MMOL/L (ref 0.6–1.2)
LYMPHOCYTES # BLD AUTO: 2.3 10E3/UL (ref 0.8–5.3)
LYMPHOCYTES NFR BLD AUTO: 25 %
MCH RBC QN AUTO: 28.2 PG (ref 26.5–33)
MCHC RBC AUTO-ENTMCNC: 32.5 G/DL (ref 31.5–36.5)
MCV RBC AUTO: 87 FL (ref 78–100)
MONOCYTES # BLD AUTO: 0.5 10E3/UL (ref 0–1.3)
MONOCYTES NFR BLD AUTO: 6 %
NEUTROPHILS # BLD AUTO: 6.2 10E3/UL (ref 1.6–8.3)
NEUTROPHILS NFR BLD AUTO: 64 %
NRBC # BLD AUTO: 0 10E3/UL
NRBC BLD AUTO-RTO: 0 /100
PLATELET # BLD AUTO: 277 10E3/UL (ref 150–450)
POTASSIUM BLD-SCNC: 4 MMOL/L (ref 3.4–5.3)
RBC # BLD AUTO: 4.85 10E6/UL (ref 3.8–5.9)
SODIUM SERPL-SCNC: 144 MMOL/L (ref 133–144)
WBC # BLD AUTO: 9.4 10E3/UL (ref 4–11)

## 2023-01-16 PROCEDURE — 36415 COLL VENOUS BLD VENIPUNCTURE: CPT

## 2023-01-16 PROCEDURE — 80178 ASSAY OF LITHIUM: CPT

## 2023-01-16 PROCEDURE — 80048 BASIC METABOLIC PNL TOTAL CA: CPT

## 2023-01-16 PROCEDURE — 85025 COMPLETE CBC W/AUTO DIFF WBC: CPT

## 2023-01-16 NOTE — TELEPHONE ENCOUNTER
Pt was previously scheduled for gender affirming top surgery with Dr Mayers on 1/4/23. However, pt did not have an insurance approval for this surgery before this date. Additionally, pt had a pre-op H&P with PCP BROOKLYN Edwards CNP which stated that before surgery pt would need BP rechecked for improvement after starting BP meds and recheck of lithium level to ensure it is in range. Pt's surgery was cancelled due to no insurance approval prior to surgery date.    We have since received notification of insurance payor approval from financial securing. Pt okay to reschedule surgery. Called pt to discuss this and to inform of need for another pre-op H&P within 30 days of surgery plus following any recommendations made from this appointment. Unable to reach, left voicemail with callback number. Pt is ok to move forward with surgery for now. If pt does not return this call, will follow up after surgery is scheduled to discuss the above.    Carlos Burkett RN

## 2023-01-18 ENCOUNTER — MYC MEDICAL ADVICE (OUTPATIENT)
Dept: PSYCHIATRY | Facility: CLINIC | Age: 33
End: 2023-01-18
Payer: MEDICARE

## 2023-01-18 ENCOUNTER — TELEPHONE (OUTPATIENT)
Dept: PSYCHIATRY | Facility: CLINIC | Age: 33
End: 2023-01-18
Payer: MEDICARE

## 2023-01-18 DIAGNOSIS — F25.0 SCHIZOAFFECTIVE DISORDER, BIPOLAR TYPE (H): ICD-10-CM

## 2023-01-18 NOTE — TELEPHONE ENCOUNTER
Patient called and says that he saw message from lab result and does not want to increase lithium dose. He states that he does not want to take it at all anymore. He feels like his depression is getting worse and wants to be on something else. He doesn't have anything specific that he wants to try. He would like to see what mattie thinks. Denies thoughts of self harm, thoughts of harming others, and suicidal ideation and does agree to go to the hospital if he starts to have these thoughts.

## 2023-01-19 ENCOUNTER — VIRTUAL VISIT (OUTPATIENT)
Dept: FAMILY MEDICINE | Facility: CLINIC | Age: 33
End: 2023-01-19
Payer: COMMERCIAL

## 2023-01-19 DIAGNOSIS — E66.01 MORBID OBESITY (H): ICD-10-CM

## 2023-01-19 DIAGNOSIS — E11.65 TYPE 2 DIABETES MELLITUS WITH HYPERGLYCEMIA, WITHOUT LONG-TERM CURRENT USE OF INSULIN (H): Primary | ICD-10-CM

## 2023-01-19 PROBLEM — E11.9 DIABETES MELLITUS, TYPE 2 (H): Status: ACTIVE | Noted: 2023-01-19

## 2023-01-19 PROCEDURE — 99213 OFFICE O/P EST LOW 20 MIN: CPT | Mod: 95 | Performed by: NURSE PRACTITIONER

## 2023-01-19 RX ORDER — SEMAGLUTIDE 1.34 MG/ML
INJECTION, SOLUTION SUBCUTANEOUS
Qty: 6 ML | Refills: 0 | Status: SHIPPED | OUTPATIENT
Start: 2023-01-19 | End: 2023-05-03

## 2023-01-19 ASSESSMENT — ENCOUNTER SYMPTOMS: CONSTITUTIONAL NEGATIVE: 1

## 2023-01-19 NOTE — PROGRESS NOTES
Prakash is a 32 year old who is being evaluated via a billable video visit.      How would you like to obtain your AVS? MyChart  If the video visit is dropped, the invitation should be resent by: Text to cell phone: [phone]  Will anyone else be joining your video visit? No        Assessment & Plan     Type 2 diabetes mellitus with hyperglycemia, without long-term current use of insulin (H)  Plan to remain off metformin due to excessive diarrhea.  Will plan to start on Ozempic.  Educated on use and possible side effects.  Will plan to recheck A1c in 3 months.  - semaglutide (OZEMPIC, 0.25 OR 0.5 MG/DOSE,) 2 MG/1.5ML SOPN pen; Inject 0.25 mg Subcutaneous every 7 days for 30 days, THEN 0.5 mg every 7 days for 90 days.  - Hemoglobin A1c; Future    Morbid obesity (H)  Increasing weight likely secondary to psychiatric medications.  Will plan to start on Ozempic.  Educated on use and possible side effects.  - semaglutide (OZEMPIC, 0.25 OR 0.5 MG/DOSE,) 2 MG/1.5ML SOPN pen; Inject 0.25 mg Subcutaneous every 7 days for 30 days, THEN 0.5 mg every 7 days for 90 days.    Review of the result(s) of each unique test - labs  Ordering of each unique test  Prescription drug management  20 minutes spent on the date of the encounter doing chart review, history and exam, documentation and further activities per the note       No follow-ups on file.    BROOKLYN Cruz [facility] SHAILESH Randall is a 32 year old, presenting for the following health issues:  Video Visit (diabetes)      HPI       *Follow up on elevated labs      BP Readings from Last 2 Encounters:   12/27/22 133/88   12/15/22 (!) 138/100     Hemoglobin A1C (%)   Date Value   12/15/2022 6.5 (H)   08/29/2022 6.4 (H)   03/19/2021 5.8 (H)   11/05/2018 5.8 (H)     LDL Cholesterol Calculated (mg/dL)   Date Value   12/15/2022 10   08/29/2022 78   04/28/2021 81   03/01/2021 51                 Video visit completed due to COVID 19  outbreak.     Recently had labs drawn blood sugar 203.  Previously prescribed metformin.  Was having severe diarrhea onto metformin.  Metformin discontinued.  Diarrhea has resolved.  Not currently taking any medication to control blood sugar.  No family history of thyroid cancers.  No symptoms of elevated blood sugar.  No excessive thirst, urination or hunger.  Is not currently having any diarrhea. Was having severe diarrhea on 2 metformin. No family history of thyroid.     Review of Systems   Constitutional: Negative.             Objective           Vitals:  No vitals were obtained today due to virtual visit.    Physical Exam  Constitutional:       Appearance: Normal appearance.   HENT:      Nose: No congestion.   Eyes:      General: Lids are normal.   Pulmonary:      Effort: No tachypnea, bradypnea or respiratory distress.   Skin:     Coloration: Skin is not ashen, cyanotic, jaundiced or pale.   Neurological:      Mental Status: He is alert.   Psychiatric:         Mood and Affect: Mood normal.         Speech: Speech normal.         Behavior: Behavior normal.              Video-Visit Details    Type of service:  Video Visit   Video Start Time: 11:44 AM  Video End Time:11:53 AM    Originating Location (pt. Location): Home  Distant Location (provider location):  On-site  Platform used for Video Visit: [employer]

## 2023-01-21 ENCOUNTER — MYC MEDICAL ADVICE (OUTPATIENT)
Dept: FAMILY MEDICINE | Facility: CLINIC | Age: 33
End: 2023-01-21
Payer: MEDICARE

## 2023-01-24 ENCOUNTER — TELEPHONE (OUTPATIENT)
Dept: FAMILY MEDICINE | Facility: CLINIC | Age: 33
End: 2023-01-24

## 2023-01-24 ENCOUNTER — VIRTUAL VISIT (OUTPATIENT)
Dept: FAMILY MEDICINE | Facility: CLINIC | Age: 33
End: 2023-01-24
Payer: COMMERCIAL

## 2023-01-24 DIAGNOSIS — F64.9 GENDER DYSPHORIA: Primary | ICD-10-CM

## 2023-01-24 DIAGNOSIS — F31.64 BIPOLAR AFFECTIVE DISORDER, MIXED, SEVERE, WITH PSYCHOTIC BEHAVIOR (H): ICD-10-CM

## 2023-01-24 DIAGNOSIS — Z78.9 FEMALE-TO-MALE TRANSGENDER PERSON: ICD-10-CM

## 2023-01-24 DIAGNOSIS — Z51.81 ENCOUNTER FOR THERAPEUTIC DRUG MONITORING: ICD-10-CM

## 2023-01-24 PROCEDURE — 99214 OFFICE O/P EST MOD 30 MIN: CPT | Mod: 95 | Performed by: STUDENT IN AN ORGANIZED HEALTH CARE EDUCATION/TRAINING PROGRAM

## 2023-01-24 RX ORDER — TESTOSTERONE CYPIONATE 200 MG/ML
20 INJECTION, SOLUTION INTRAMUSCULAR WEEKLY
Qty: 5 ML | Refills: 1 | Status: SHIPPED | OUTPATIENT
Start: 2023-01-24 | End: 2023-05-04

## 2023-01-24 RX ORDER — TESTOSTERONE CYPIONATE 200 MG/ML
20 INJECTION, SOLUTION INTRAMUSCULAR WEEKLY
Qty: 5 ML | Refills: 0 | Status: SHIPPED | OUTPATIENT
Start: 2023-01-24 | End: 2023-01-24

## 2023-01-24 NOTE — PROGRESS NOTES
Telephone Preceptor Attestation:   Patient discussed with the resident. I have verified the content of the note, which accurately reflects my assessment of the patient and the plan of care.   Supervising Physician:  Nancy Ravi MD

## 2023-01-24 NOTE — PATIENT INSTRUCTIONS
It was palmira to hear from you,    We will start your testosterone again.  After clarifying which pharmacy would be best I will send it to that pharmacy.  I will also with your permission reach out to your primary care provider and we will try to arrange for you to get your testosterone checked 4 weeks after you initiated, and then we should check in via virtual visit to make sure that you are feeling well after we have that level checked.  Please reach out to me if you have any questions or concerns.    Palmira to meet you (sort of meet you),   Nory Grissom MD

## 2023-01-24 NOTE — Clinical Note
Hello, I provide [facility] gender care.  A barrier has been getting regular testing done in order to stay on testosterone, it sounds like getting testing done at your office may be more effective.  I have asked for his permission to chat with you, if I order the labs, would you be able to help him get scheduled for a lab visit at your [place] office in about 5 weeks?  I just need a testosterone level. Thank you so much! Nory Grissom MD

## 2023-01-24 NOTE — PROGRESS NOTES
"Prakash is a 32 year old who is being evaluated via a billable video visit.      How would you like to obtain your AVS? MyChart  If the video visit is dropped, the invitation should be resent by: Text to cell phone: [phone]  Will anyone else be joining your video visit? No    Assessment & Plan     Gender dysphoria  Bipolar disorder, not exacerbated by testosterone based on chart review  Patient has difficulty following up with necessary labs to be on testosterone stable he, we troubleshoot this and think that may be using primary care office to draw labs may be helpful in maintaining his goal of staying on testosterone.  Based on last 3 years of data, at least on low levels of testosterone his mental health stabilizes and this medication is of significant benefit for him to remain on.  We will start testosterone at 0.1 mL or approximately 20 mg subcutaneous injection once a week, in 1 month he should have a testosterone level checked.  I will reach out to his primary care provider in [place] and request that he be allowed to draw labs there, as this is still within the [facility] system it should still be visible to me here.  After that time we will discuss whether we want to increase dosing or stay where we are at.  Virtual follow-up is perfectly acceptable if that helps with patient attendance.  Patient expressed good understanding of risks and benefits and we will reinitiate testosterone today. No sperm exposure.   - Needle, Disp, 25G X 5/8\" MISC; Use to inject weekly Testosterone dose  - needle, disp, 18G X 1\" MISC; Use to draw up weekly testosterone dose  - syringe, disposable, 1 ML MISC; Use to draw up and administer weekly testosterone dose  - testosterone cypionate (DEPOTESTOSTERONE) 200 MG/ML injection; Inject 0.1 mLs (20 mg) Subcutaneous once a week    Patient is a member of the LGBT community, this is a social determinants of health that impacts how they interact with the medical system.    No follow-ups " on file.    Nory Grissom MD  [facility] LINETTE Randall is a 32 year old, presenting for the following health issues:  Recheck Medication      HPI     Wants to be back on T, has been in stable mental health for >6 months and well established with his PCP.   OK with us reaching out to PCP to coordinate lab tests.  Confirmed will start with low dose (20mg weekly) as this has been very well tolerated and escalate from there if needed.     Review of Systems   See ROS      Objective           Vitals:  No vitals were obtained today due to virtual visit.    Physical Exam   GENERAL: Clear and appropriate voice and conversation   RESP: No audible wheeze, cough  NEURO: Mentation and speech appropriate for age.  PSYCH: Mentation appears normal,judgement and insight intact, normal speech    Lab on 01/16/2023   Component Date Value Ref Range Status     Lithium 01/16/2023 0.4 (L)  0.6 - 1.2 mmol/L Final    Therapeutic: 0.60 - 1.20 mmol/L;   Toxic: >2.00 mmol/L     Sodium 01/16/2023 144  133 - 144 mmol/L Final     Potassium 01/16/2023 4.0  3.4 - 5.3 mmol/L Final     Chloride 01/16/2023 112 (H)  94 - 109 mmol/L Final    0-20 years:       Female:  mmol/L  Male:    mmol/L       20 years and older:   Female:  mmol/L   Male:    mmol/L       Carbon Dioxide (CO2) 01/16/2023 23  20 - 32 mmol/L Final     Anion Gap 01/16/2023 9  3 - 14 mmol/L Final     Urea Nitrogen 01/16/2023 15  7 - 30 mg/dL Final    Female  0 to 15 days           3-23  15 days to 1 year      3-17  1 to 10 years          9-22  10 to 19 years         7-19  19 years and older     7-30    Male  0 to 15 days           3-23  15 days to 1 year      3-17  1 to 10 years          9-22  10 to 19 years         7-21  19 years and older     7-30     Creatinine 01/16/2023 0.62  0.52 - 1.25 mg/dL Final    20 y and older Female 0.52-1.04 mg/dL  20 y and older Male 0.66-1.25 mg/dL    Varies with the amount of muscle mass  present.    GICH  Female: 0.6-1.2 mg/dL  Male: 0.7-1.3 mg/dL       Calcium 01/16/2023 10.2 (H)  8.5 - 10.1 mg/dL Final     Glucose 01/16/2023 203 (H)  70 - 99 mg/dL Final     GFR Estimate 01/16/2023 >90  >60 mL/min/1.73m2 Final    GFR not calculated when sex unspecified or nonbinary.  Effective December 21, 2021 eGFRcr in adults is calculated using the 2021 CKD-EPI creatinine equation which includes age and gender (Terrell et al., NEJ, DOI: 10.1056/UXKBar1366301)     WBC Count 01/16/2023 9.4  4.0 - 11.0 10e3/uL Final     RBC Count 01/16/2023 4.85  3.80 - 5.90 10e6/uL Final    Reference Range:                                                     Female 3.80-5.20 10e6/uL                                      Male 4.40-5.90 10e6u/L     Hemoglobin 01/16/2023 13.7  11.7 - 17.7 g/dL Final    Reference Range:                                                     Female 11.7-15.7 g/dL                                      Male 13.3-17.7 g/dL     Hematocrit 01/16/2023 42.1  35.0 - 53.0 % Final    Reference Range:                                                     Female 35.0-47.0 %                                            Male 40.0-54.0 %     MCV 01/16/2023 87  78 - 100 fL Final     MCH 01/16/2023 28.2  26.5 - 33.0 pg Final     MCHC 01/16/2023 32.5  31.5 - 36.5 g/dL Final     RDW 01/16/2023 13.2  10.0 - 15.0 % Final     Platelet Count 01/16/2023 277  150 - 450 10e3/uL Final     % Neutrophils 01/16/2023 64  % Final     % Lymphocytes 01/16/2023 25  % Final     % Monocytes 01/16/2023 6  % Final     % Eosinophils 01/16/2023 3  % Final     % Basophils 01/16/2023 1  % Final     % Immature Granulocytes 01/16/2023 1  % Final     NRBCs per 100 WBC 01/16/2023 0  <1 /100 Final     Absolute Neutrophils 01/16/2023 6.2  1.6 - 8.3 10e3/uL Final     Absolute Lymphocytes 01/16/2023 2.3  0.8 - 5.3 10e3/uL Final     Absolute Monocytes 01/16/2023 0.5  0.0 - 1.3 10e3/uL Final     Absolute Eosinophils 01/16/2023 0.3  0.0 - 0.7 10e3/uL Final      Absolute Basophils 01/16/2023 0.1  0.0 - 0.2 10e3/uL Final     Absolute Immature Granulocytes 01/16/2023 0.1  <=0.4 10e3/uL Final     Absolute NRBCs 01/16/2023 0.0  10e3/uL Final         Video-Visit Details    Type of service:  Telephone  Length of call: 13 minutes    Originating Location (pt. Location): Home  Distant Location (provider location):  On-site  Platform used for Video Visit: Doximity as unable to complete video visit, converted to telephone

## 2023-01-24 NOTE — TELEPHONE ENCOUNTER
Patient confirmed that he would like medication sent to [facility] not [place].     Mary Martinez RN

## 2023-01-24 NOTE — TELEPHONE ENCOUNTER
[facility] phone call message- medication clarification/question:    Full Medication Name: testosterone cypionate (DEPOTESTOSTERONE) 200 MG/ML injection    Question: The [facility] pharmacy is looking to have this medication transferred from [place] to their pharmacy.    Pharmacy confirmed as      [facility] - [address]    : Yes    OK to leave a message on voice mail? Yes    Primary language: English      needed? No    Call taken on January 24, 2023 at 2:40 PM by Tracey Patel

## 2023-01-26 ENCOUNTER — TELEPHONE (OUTPATIENT)
Dept: FAMILY MEDICINE | Facility: CLINIC | Age: 33
End: 2023-01-26
Payer: MEDICARE

## 2023-01-26 DIAGNOSIS — F51.3 SLEEP WALKING: Primary | ICD-10-CM

## 2023-01-26 DIAGNOSIS — F25.0 SCHIZOAFFECTIVE DISORDER, BIPOLAR TYPE (H): ICD-10-CM

## 2023-01-26 DIAGNOSIS — L70.0 ACNE VULGARIS: ICD-10-CM

## 2023-01-26 RX ORDER — DOXYCYCLINE 100 MG/1
100 CAPSULE ORAL 2 TIMES DAILY
Qty: 60 CAPSULE | Refills: 4 | OUTPATIENT
Start: 2023-01-26

## 2023-01-26 NOTE — TELEPHONE ENCOUNTER
[facility] Call Center    Phone Message    May a detailed message be left on voicemail: no     Reason for Call: Medication Question or concern regarding medication   Prescription Clarification  Name of Medication: doxycycline monohydrate (MONODOX) 100 MG capsule  Prescribing Provider: Madiha   Pharmacy: [facility]  [address] [place] [address] [address]   ph: [phone]   What on the order needs clarification? Rx needs to be sent to above pharmacy          Action Taken: Message routed to:  [facility] DERM    Travel Screening: Not Applicable

## 2023-01-26 NOTE — TELEPHONE ENCOUNTER
doxycycline monohydrate (MONODOX) 100 MG capsule  Last Written Prescription Date:   8/8/2022  Last Fill Quantity: 60,   # refills: 4  Last Office Visit :  8/8/2022  Future Office visit:  None    Routing refill request to provider for review/approval because:  Pt needs updated office visit  Med refused and scheduling notified      Darline Fritz RN  Central Triage Red Flags/Med Refills    Assessment & Plan:   8/8/2022     1. Acne vulgaris: inflammatory, with scarring, in context of testosterone supplementation for FTM. Not interested in isotretinoin; we discussed risks/benefits of alternatives and will start doxycycline + topicals.  - doxycycline 100 mg BID  - tretinoin 0.025% cream at bedtime, BPO wash     Procedures Performed:    None     Follow-up: 3 months     Staff:      Mark Cleary MD, FAAD   of Dermatology  Department of Dermatology  [facility]

## 2023-01-26 NOTE — TELEPHONE ENCOUNTER
Forms received from: [facility]   Phone number listed: [phone]   Fax listed: [phone]  Date received: 1/26/23  Form description: Specialty referral for [facility]  Once forms are completed, please return to [facility] via fax [phone].  Is patient requesting to be contacted when forms are completed: na  Phone: na  Form placed:  To Becki Sorto

## 2023-01-26 NOTE — TELEPHONE ENCOUNTER
Writer called patient to explain patient is going to require refills due to the fact this was last ordered August of 2022. Explained to patient I would send this off to the refill team for review and to alert them to the pharmacy requested by the patient.    Nadine ODONNELL RN

## 2023-01-27 ENCOUNTER — TELEPHONE (OUTPATIENT)
Dept: DERMATOLOGY | Facility: CLINIC | Age: 33
End: 2023-01-27
Payer: MEDICARE

## 2023-01-30 ENCOUNTER — TELEPHONE (OUTPATIENT)
Dept: FAMILY MEDICINE | Facility: CLINIC | Age: 33
End: 2023-01-30

## 2023-01-30 DIAGNOSIS — Z79.899 MEDICATION MANAGEMENT: Primary | ICD-10-CM

## 2023-01-30 RX ORDER — LITHIUM CARBONATE 300 MG/1
900 TABLET, FILM COATED, EXTENDED RELEASE ORAL AT BEDTIME
Qty: 90 TABLET | Refills: 0 | Status: CANCELLED | OUTPATIENT
Start: 2023-01-30

## 2023-01-30 NOTE — TELEPHONE ENCOUNTER
Patient calling with questions about ozempic pens.   Wonders if he is supposed to use a new needle each time?  I advised yes he should.   He is not sure how to get the old pen needle off.    I did online search for ozempic and found patient instructions, patient was able to remove old needle and sees how the new needle will go on.    Concerned about only having 5 needles left but sounds like the volume dispensed might only be a partial fill.    Advised he call pharmacy when running out of med and/or if needs more pens.   Pharmacy can ask us to send Rx for extra pen needles if necessary.    Patient verbalized understanding of and agreement with plan and expressed gratitude for my time in looking this up.    Edith Batista RN  [facility]

## 2023-01-31 ENCOUNTER — MYC MEDICAL ADVICE (OUTPATIENT)
Dept: FAMILY MEDICINE | Facility: CLINIC | Age: 33
End: 2023-01-31
Payer: MEDICARE

## 2023-01-31 DIAGNOSIS — E11.65 TYPE 2 DIABETES MELLITUS WITH HYPERGLYCEMIA, WITHOUT LONG-TERM CURRENT USE OF INSULIN (H): Primary | ICD-10-CM

## 2023-02-01 ENCOUNTER — TELEPHONE (OUTPATIENT)
Dept: PLASTIC SURGERY | Facility: CLINIC | Age: 33
End: 2023-02-01
Payer: MEDICARE

## 2023-02-01 ENCOUNTER — DOCUMENTATION ONLY (OUTPATIENT)
Dept: PLASTIC SURGERY | Facility: CLINIC | Age: 33
End: 2023-02-01
Payer: MEDICARE

## 2023-02-01 RX ORDER — GLUCOSAMINE HCL/CHONDROITIN SU 500-400 MG
CAPSULE ORAL
Qty: 100 EACH | Refills: 3 | Status: SHIPPED | OUTPATIENT
Start: 2023-02-01 | End: 2023-05-26

## 2023-02-01 RX ORDER — LANCETS
EACH MISCELLANEOUS
Qty: 100 EACH | Refills: 3 | Status: SHIPPED | OUTPATIENT
Start: 2023-02-01 | End: 2023-02-01

## 2023-02-01 RX ORDER — LANCETS
EACH MISCELLANEOUS
Qty: 100 EACH | Refills: 3 | Status: SHIPPED | OUTPATIENT
Start: 2023-02-01 | End: 2023-03-09

## 2023-02-01 RX ORDER — GLUCOSAMINE HCL/CHONDROITIN SU 500-400 MG
CAPSULE ORAL
Qty: 100 EACH | Refills: 3 | Status: SHIPPED | OUTPATIENT
Start: 2023-02-01 | End: 2023-02-01

## 2023-02-01 NOTE — TELEPHONE ENCOUNTER
Called pt to follow up on a voicemail requesting to reschedule surgery. Provided pt with surgery scheduler's phone number and asked him to call her. Also shared that pt would need a pre-op H&P within 30 days of surgery. Discussed with pt that his PCP wanted him to get BP rechecked and have labs drawn before surgery, which he would need to follow up with before surgery. Pt verbalized an understanding and will schedule another pre-op H&P within 30 days of surgery once it is scheduled.    Carlos NUÑEZ, RN

## 2023-02-01 NOTE — PROGRESS NOTES
Pt was previously scheduled for top surgery with Dr Mayers, which was rescheduled for 4/24/23. Pt understands the need to schedule a new pre-op H&P and follow instructions given (recheck BP, check labs). Pt also needs to be nicotine free for one month before and after surgery. Pt had quit nicotine in November. Will follow up with pt to ask if he continues to be nicotine free.     Carlos NUÑEZ RN

## 2023-02-01 NOTE — TELEPHONE ENCOUNTER
RN Care Coordinator: Carlos Burkett; [phone]    Surgery is scheduled with Dr. Mayers   Date: 4/24   Location: [facility]  Scheduled per: next available date    H&P to be completed by Primary Care team; patient to schedule    Surgical consult: 4/4     Post-op visit(s): 5/2 + 6/6    Patient will receive a phone call from pre-admission nurses 1-2 days prior to surgery with arrival and start time.        Spoke with the patient and was able to confirm all scheduled information.       Patient questions/concerns: N/A       Surgery packet to be sent via US mail and via [employer]    __    Deena Youngblood, Senior Perioperative Coordinator, on 2/1/2023 at 2:10 PM  P: [phone]

## 2023-02-01 NOTE — TELEPHONE ENCOUNTER
Resent RXs for blood glucose meter device kit and supplies to preferred pharmacy, as patient requested.    Ponce Reed RN

## 2023-02-06 ENCOUNTER — VIRTUAL VISIT (OUTPATIENT)
Dept: DERMATOLOGY | Facility: CLINIC | Age: 33
End: 2023-02-06
Payer: COMMERCIAL

## 2023-02-06 ENCOUNTER — TELEPHONE (OUTPATIENT)
Dept: FAMILY MEDICINE | Facility: CLINIC | Age: 33
End: 2023-02-06

## 2023-02-06 DIAGNOSIS — L70.0 ACNE VULGARIS: Primary | ICD-10-CM

## 2023-02-06 DIAGNOSIS — D17.22 LIPOMA OF LEFT UPPER EXTREMITY: ICD-10-CM

## 2023-02-06 PROCEDURE — 99441 PR PHYSICIAN TELEPHONE EVALUATION 5-10 MIN: CPT | Mod: 93 | Performed by: DERMATOLOGY

## 2023-02-06 RX ORDER — DOXYCYCLINE 100 MG/1
100 CAPSULE ORAL 2 TIMES DAILY
Qty: 60 CAPSULE | Refills: 6 | Status: ON HOLD | OUTPATIENT
Start: 2023-02-06 | End: 2023-06-08

## 2023-02-06 RX ORDER — TRETINOIN 0.5 MG/G
CREAM TOPICAL AT BEDTIME
Qty: 45 G | Refills: 3 | Status: ON HOLD | OUTPATIENT
Start: 2023-02-06 | End: 2023-06-08

## 2023-02-06 NOTE — PROGRESS NOTES
[facility] Dermatology Note  Encounter Date: Feb 6, 2023  Store-and-Forward and Telephone ([phone]). Location of teledermatologist: [facility] [place].  Start time: 12:25. End time: 12:35.    Dermatology Problem List:  1. Severe nodulocystic acne  - doxycycline 100 mg BID, tretinoin 0.025% at bedtime, BPO 5% wash  - in reserve: isotretinoin  - on testosterone supplementation for FTM   2. Left upper arm: suspect lipoma  ____________________________________________    Assessment & Plan:     1. Acne vulgaris: inflammatory, likely exacerbated by testosterone supplementation. Going to restart testosterone in near future and ran out of doxycycline. Will plan to restart doxycycline and uptitrate topicals. We did briefly discuss isotretinoin given many recent studies which have not found association with exacerbating mental health issues.  - doxycycline 100 mg BID  - tretinoin 0.05% cream  - BPO wash    2. Suspected lipoma on the left upper arm: will evaluate at next in-person visit.    Procedures Performed:    None    Follow-up: 3-4 months    Staff:     Mark Cleary MD, FAAD   of Dermatology  Department of Dermatology  [facility]    ____________________________________________    CC: Derm Problem (Acne follow-up )    HPI:  Mr. Prakash Prasad is a(n) 32 year old adult who presents today as a return patient for acne vulgaris    Acne vulgaris - on doxycycline, BPO, tretinoin  - ran out of doxycycline a few weeks ago  - getting lots of blackheads on the chin  - restarting testosterone soon    Soft bump on left upper arm - present for some time, asymptomatic    Patient is otherwise feeling well, without additional skin concerns.    Labs Reviewed:  N/A    Physical Exam:  Vitals: There were no vitals taken for this visit.  SKIN: Teledermatology photos were reviewed; image quality and interpretability: acceptable. Image  "date: 1/29/23.  - acneiform papules on the face  - subcutaneous nodule on the left upper arm  - No other lesions of concern on areas examined.     Medications:  Current Outpatient Medications   Medication     alcohol swab prep pads     amLODIPine (NORVASC) 10 MG tablet     amphetamine-dextroamphetamine (ADDERALL XR) 10 MG 24 hr capsule     aspirin-acetaminophen-caffeine (EXCEDRIN MIGRAINE) 250-250-65 MG tablet     benzoyl peroxide 5 % external liquid     blood glucose (NO BRAND SPECIFIED) test strip     blood glucose calibration (NO BRAND SPECIFIED) solution     blood glucose monitoring (NO BRAND SPECIFIED) meter device kit     famotidine (PEPCID) 20 MG tablet     gabapentin (NEURONTIN) 300 MG capsule     lithium ER (LITHOBID) 300 MG CR tablet     metaxalone (SKELAXIN) 800 MG tablet     needle, disp, 18G X 1\" MISC     Needle, Disp, 25G X 5/8\" MISC     nicotine (NICORETTE) 2 MG gum     OLANZapine (ZYPREXA) 20 MG tablet     propranolol (INDERAL) 10 MG tablet     rosuvastatin (CRESTOR) 10 MG tablet     semaglutide (OZEMPIC, 0.25 OR 0.5 MG/DOSE,) 2 MG/1.5ML SOPN pen     syringe, disposable, 1 ML MISC     testosterone cypionate (DEPOTESTOSTERONE) 200 MG/ML injection     thin (NO BRAND SPECIFIED) lancets     tretinoin (RETIN-A) 0.025 % external cream     doxycycline monohydrate (MONODOX) 100 MG capsule     No current facility-administered medications for this visit.      Past Medical/Surgical History:   Patient Active Problem List   Diagnosis     ADHD (attention deficit hyperactivity disorder)     Bipolar 1 disorder, manic, mild     Marijuana abuse     Polysubstance abuse (H)     GERD (gastroesophageal reflux disease)     Tobacco abuse     Intractable back pain     Optic neuritis     Cauda equina syndrome with neurogenic bladder (H)     Schizoaffective disorder, bipolar type (H)     PTSD (post-traumatic stress disorder)     Anxiety     Auditory hallucination     Nephrolithiasis     Cyst of left ovary     Borderline " personality disorder (H)     Cannabis dependence (H)     Depression     Episodic mood disorder (H)     History of heroin abuse (H)     Moderate episode of recurrent major depressive disorder (H)     Opioid use disorder, severe, dependence (H)     Substance-induced psychotic disorder with hallucinations (H)     Nausea     Overdose     Bella (H)     Urinary retention     Chronic bilateral low back pain without sciatica     AVA (generalized anxiety disorder)     Aggressive behavior     Gender identity disorder     Lumbosacral radiculopathy at L5     DUB (dysfunctional uterine bleeding)     Seizure-like activity (H)     Acanthosis nigricans     Prediabetes     Schizoaffective disorder, chronic condition with acute exacerbation (H)     Bipolar affective disorder, mixed, severe, with psychotic behavior (H)     Schizophrenia, schizoaffective, chronic with acute exacerbation (H)     Akathisia     Hypertension, unspecified type     Female-to-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 person     Morbid obesity (H)     Diabetes mellitus, type 2 (H)     Past Medical History:   Diagnosis Date     ADHD (attention deficit hyperactivity disorder)      Bipolar 1 disorder      Borderline personality disorder      Cauda equina syndrome      Chronic low back pain      Depression      Diabetes mellitus, type 2 (H) 1/19/2023     GERD (gastroesophageal reflux disease)      h/o TBI (traumatic brain injury)      Hypertension, unspecified type 12/16/2021     Marginal corneal ulcer, left 07/17/2015     Nephrolithiasis      obesity      Polysubstance abuse - methamphetamine, hallucinagen, heroin, marijuana     currently in remission     PONV (postoperative nausea and vomiting)      PTSD (post-traumatic stress disorder)        CC No referring provider defined for this encounter. on close of this encounter.

## 2023-02-06 NOTE — LETTER
2/6/2023       RE: Ayana Prasad  [address]  [place] [address]     Dear Colleague,    Thank you for referring your patient, Ayana Prasad, to the [facility] [place] at [facility]. Please see a copy of my visit note below.    [facility] Dermatology Note  Encounter Date: Feb 6, 2023  Store-and-Forward and Telephone ([phone]). Location of teledermatologist: [facility] [place].  Start time: 12:25. End time: 12:35.    Dermatology Problem List:  1. Severe nodulocystic acne  - doxycycline 100 mg BID, tretinoin 0.025% at bedtime, BPO 5% wash  - in reserve: isotretinoin  - on testosterone supplementation for FTM   2. Left upper arm: suspect lipoma  ____________________________________________    Assessment & Plan:     1. Acne vulgaris: inflammatory, likely exacerbated by testosterone supplementation. Going to restart testosterone in near future and ran out of doxycycline. Will plan to restart doxycycline and uptitrate topicals. We did briefly discuss isotretinoin given many recent studies which have not found association with exacerbating mental health issues.  - doxycycline 100 mg BID  - tretinoin 0.05% cream  - BPO wash    2. Suspected lipoma on the left upper arm: will evaluate at next in-person visit.    Procedures Performed:    None    Follow-up: 3-4 months    Staff:     Mark Cleary MD, FAAD   of Dermatology  Department of Dermatology  [facility]    ____________________________________________    CC: Derm Problem (Acne follow-up )    HPI:  Mr. Prakash Prasad is a(n) 32 year old adult who presents today as a return patient for acne vulgaris    Acne vulgaris - on doxycycline, BPO, tretinoin  - ran out of doxycycline a few weeks ago  - getting lots of blackheads on the chin  - restarting testosterone soon    Soft bump on left  "upper arm - present for some time, asymptomatic    Patient is otherwise feeling well, without additional skin concerns.    Labs Reviewed:  N/A    Physical Exam:  Vitals: There were no vitals taken for this visit.  SKIN: Teledermatology photos were reviewed; image quality and interpretability: acceptable. Image date: 1/29/23.  - acneiform papules on the face  - subcutaneous nodule on the left upper arm  - No other lesions of concern on areas examined.     Medications:  Current Outpatient Medications   Medication     alcohol swab prep pads     amLODIPine (NORVASC) 10 MG tablet     amphetamine-dextroamphetamine (ADDERALL XR) 10 MG 24 hr capsule     aspirin-acetaminophen-caffeine (EXCEDRIN MIGRAINE) 250-250-65 MG tablet     benzoyl peroxide 5 % external liquid     blood glucose (NO BRAND SPECIFIED) test strip     blood glucose calibration (NO BRAND SPECIFIED) solution     blood glucose monitoring (NO BRAND SPECIFIED) meter device kit     famotidine (PEPCID) 20 MG tablet     gabapentin (NEURONTIN) 300 MG capsule     lithium ER (LITHOBID) 300 MG CR tablet     metaxalone (SKELAXIN) 800 MG tablet     needle, disp, 18G X 1\" MISC     Needle, Disp, 25G X 5/8\" MISC     nicotine (NICORETTE) 2 MG gum     OLANZapine (ZYPREXA) 20 MG tablet     propranolol (INDERAL) 10 MG tablet     rosuvastatin (CRESTOR) 10 MG tablet     semaglutide (OZEMPIC, 0.25 OR 0.5 MG/DOSE,) 2 MG/1.5ML SOPN pen     syringe, disposable, 1 ML MISC     testosterone cypionate (DEPOTESTOSTERONE) 200 MG/ML injection     thin (NO BRAND SPECIFIED) lancets     tretinoin (RETIN-A) 0.025 % external cream     doxycycline monohydrate (MONODOX) 100 MG capsule     No current facility-administered medications for this visit.      Past Medical/Surgical History:   Patient Active Problem List   Diagnosis     ADHD (attention deficit hyperactivity disorder)     Bipolar 1 disorder, manic, mild     Marijuana abuse     Polysubstance abuse (H)     GERD (gastroesophageal reflux " disease)     Tobacco abuse     Intractable back pain     Optic neuritis     Cauda equina syndrome with neurogenic bladder (H)     Schizoaffective disorder, bipolar type (H)     PTSD (post-traumatic stress disorder)     Anxiety     Auditory hallucination     Nephrolithiasis     Cyst of left ovary     Borderline personality disorder (H)     Cannabis dependence (H)     Depression     Episodic mood disorder (H)     History of heroin abuse (H)     Moderate episode of recurrent major depressive disorder (H)     Opioid use disorder, severe, dependence (H)     Substance-induced psychotic disorder with hallucinations (H)     Nausea     Overdose     Bella (H)     Urinary retention     Chronic bilateral low back pain without sciatica     AVA (generalized anxiety disorder)     Aggressive behavior     Gender identity disorder     Lumbosacral radiculopathy at L5     DUB (dysfunctional uterine bleeding)     Seizure-like activity (H)     Acanthosis nigricans     Prediabetes     Schizoaffective disorder, chronic condition with acute exacerbation (H)     Bipolar affective disorder, mixed, severe, with psychotic behavior (H)     Schizophrenia, schizoaffective, chronic with acute exacerbation (H)     Akathisia     Hypertension, unspecified type     Female-to-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 person     Morbid obesity (H)     Diabetes mellitus, type 2 (H)     Past Medical History:   Diagnosis Date     ADHD (attention deficit hyperactivity disorder)      Bipolar 1 disorder      Borderline personality disorder      Cauda equina syndrome      Chronic low back pain      Depression      Diabetes mellitus, type 2 (H) 1/19/2023     GERD (gastroesophageal reflux disease)      h/o TBI (traumatic brain injury)      Hypertension, unspecified type 12/16/2021     Marginal corneal ulcer, left 07/17/2015     Nephrolithiasis      obesity      Polysubstance abuse - methamphetamine, hallucinagen, heroin, marijuana     currently in remission     PONV (postoperative nausea  and vomiting)      PTSD (post-traumatic stress disorder)        CC No referring provider defined for this encounter. on close of this encounter.

## 2023-02-06 NOTE — NURSING NOTE
Chief Complaint   Patient presents with     Derm Problem     Acne follow-up    Teledermatology Nurse Call Patients:     Are you in the [facility] at the time of the encounter? yes    Today's visit will be billed to you and your insurance.    A teledermatology visit is not as thorough as an in-person visit and the quality of the photograph sent may not be of the same quality as that taken by the dermatology clinic.    Ashlyn Brower,   Virtual Visit Facilitator

## 2023-02-06 NOTE — TELEPHONE ENCOUNTER
[facility] phone call message- general phone call:    Reason for call: Syringe is different so he doesn't know how much he is injection and that he has to push down really hard. He also said that the needle is hard to insert into skin. Patient would like to talk to the nurse to follow up.     Return call needed: Yes    OK to leave a message on voice mail? Yes    Primary language: English      needed? No    Call taken on February 6, 2023 at 2:36 PM by Viji Pereira

## 2023-02-06 NOTE — TELEPHONE ENCOUNTER
I see this call in patient's chart when dealing with another phone encounter for this patient.   This is a [facility] patient.    Routed to [facility] RN pool and will address there.    Edith Batista RN  [facility]

## 2023-02-06 NOTE — TELEPHONE ENCOUNTER
See previous TE below.    Spoke with patient; states only having 3 ml syringe for drawing up Testosterone; is heading back to pharmacy; informed to get smaller 1 ml syringe that are clearly marked for 0.1 ml (20 mg) dose.    Patient stated his last injection was difficult to push in with injection; informed patient testosterone is thicker and does have a little resistance but shouldn't take that much effort with 0.1 ml.  Informed patient to bring medication and needle supplies into the pharmacy when picking up syringes, to review/look at medication and make sure it looks normal (clarity, expiration date, etc).      Patient verbalized agreement and understanding.  Kathleen Rae, RN  [facility] Pe[facility]     LR     2:38 PM  [facility] phone call message- general phone call:     Reason for call: Syringe is different so he doesn't know how much he is injection and that he has to push down really hard. He also said that the needle is hard to insert into skin. Patient would like to talk to the nurse to follow up.

## 2023-02-06 NOTE — TELEPHONE ENCOUNTER
Reason for Call:  Other prescription    Detailed comments: Pt called stating that the pharmacy is requesting prior auth for diabetes supplies.     Phone Number Patient can be reached at: Home number on file [phone] (home)    Best Time: Anytime    Can we leave a detailed message on this number? YES    Call taken on 2/6/2023 at 2:26 PM by Esthela Eric

## 2023-02-06 NOTE — TELEPHONE ENCOUNTER
I called the [facility].    Spoke to pharmacy who verifies diabetic supplies need prior auth.    Meter, control solution, test strips and lancets all require prior authorization.    [employer] insurance issue, pharmacist say it appears insurance thinks patient has part B but he does not.    Patient's regular insurance requires a PA for diabetic supplies.    Routed to PA team to investigate and initiate PA.    Edith Batista RN  [facility]

## 2023-02-08 ENCOUNTER — MYC MEDICAL ADVICE (OUTPATIENT)
Dept: PALLIATIVE MEDICINE | Facility: CLINIC | Age: 33
End: 2023-02-08
Payer: MEDICARE

## 2023-02-08 DIAGNOSIS — M51.369 DDD (DEGENERATIVE DISC DISEASE), LUMBAR: ICD-10-CM

## 2023-02-08 NOTE — TELEPHONE ENCOUNTER
Please process a refill of gabapentin (NEURONTIN) 300 MG capsule to     [facility] - [place], [place] - [address]  [address]  [facility] [address]  Phone: [phone] Fax: [phone]

## 2023-02-09 ENCOUNTER — TELEPHONE (OUTPATIENT)
Dept: PSYCHIATRY | Facility: CLINIC | Age: 33
End: 2023-02-09
Payer: MEDICARE

## 2023-02-09 ENCOUNTER — VIRTUAL VISIT (OUTPATIENT)
Dept: PSYCHIATRY | Facility: CLINIC | Age: 33
End: 2023-02-09
Attending: NURSE PRACTITIONER
Payer: COMMERCIAL

## 2023-02-09 DIAGNOSIS — F90.2 ATTENTION DEFICIT HYPERACTIVITY DISORDER (ADHD), COMBINED TYPE: ICD-10-CM

## 2023-02-09 DIAGNOSIS — F51.05 INSOMNIA DUE TO OTHER MENTAL DISORDER: ICD-10-CM

## 2023-02-09 DIAGNOSIS — F99 INSOMNIA DUE TO OTHER MENTAL DISORDER: ICD-10-CM

## 2023-02-09 DIAGNOSIS — F25.0 SCHIZOAFFECTIVE DISORDER, BIPOLAR TYPE (H): Primary | ICD-10-CM

## 2023-02-09 PROCEDURE — 99214 OFFICE O/P EST MOD 30 MIN: CPT | Mod: VID | Performed by: NURSE PRACTITIONER

## 2023-02-09 RX ORDER — DEXTROAMPHETAMINE SACCHARATE, AMPHETAMINE ASPARTATE MONOHYDRATE, DEXTROAMPHETAMINE SULFATE AND AMPHETAMINE SULFATE 2.5; 2.5; 2.5; 2.5 MG/1; MG/1; MG/1; MG/1
10 CAPSULE, EXTENDED RELEASE ORAL DAILY
Qty: 30 CAPSULE | Refills: 0 | Status: SHIPPED | OUTPATIENT
Start: 2023-02-09 | End: 2023-02-23 | Stop reason: DRUGHIGH

## 2023-02-09 RX ORDER — OLANZAPINE 20 MG/1
20 TABLET ORAL AT BEDTIME
Qty: 30 TABLET | Refills: 1 | Status: SHIPPED | OUTPATIENT
Start: 2023-02-09 | End: 2023-02-23

## 2023-02-09 RX ORDER — LITHIUM CARBONATE 300 MG/1
900 TABLET, FILM COATED, EXTENDED RELEASE ORAL AT BEDTIME
Qty: 90 TABLET | Refills: 1 | Status: SHIPPED | OUTPATIENT
Start: 2023-02-09 | End: 2023-02-23

## 2023-02-09 ASSESSMENT — PATIENT HEALTH QUESTIONNAIRE - PHQ9
SUM OF ALL RESPONSES TO PHQ QUESTIONS 1-9: 6
10. IF YOU CHECKED OFF ANY PROBLEMS, HOW DIFFICULT HAVE THESE PROBLEMS MADE IT FOR YOU TO DO YOUR WORK, TAKE CARE OF THINGS AT HOME, OR GET ALONG WITH OTHER PEOPLE: VERY DIFFICULT
SUM OF ALL RESPONSES TO PHQ QUESTIONS 1-9: 6

## 2023-02-09 NOTE — PROGRESS NOTES
Ayana Prasad is a 32 year old who is being evaluated via a billable video visit.      Pt will join video visit via: [employer]  If there are problems joining the visit, send backup video invite via: Text to preferred phone: [phone]    Reason for telehealth visit: Patient has requested telehealth visit    Originating location (patient location): Patient's home    Will anyone else be joining the visit? No    SARAH Celaya on 2/9/2023 at 2:23 PM      Answers for HPI/ROS submitted by the patient on 2/9/2023  If you checked off any problems, how difficult have these problems made it for you to do your work, take care of things at home, or get along with other people?: Very difficult  PHQ9 TOTAL SCORE: 6

## 2023-02-09 NOTE — TELEPHONE ENCOUNTER
Patient called to report that group home gave their gabapentin and lithium to another person that lives there because that person was out of their's. Patient is unsure who he should report this to. Writer will try to find out to patient should report to and get back to patient.

## 2023-02-09 NOTE — TELEPHONE ENCOUNTER
Received fax from pharmacy requesting refill(s) for     gabapentin (NEURONTIN) 300 MG capsule     Date last filled 01.10.2023    Last Appt Date:07.22.2022    Next Appt scheduled: 03.07.2023    Pharmacy:      [facility] - [facility] [address]      Will route for processing    Luz PEREZ [facility] Visit Facilitator
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
## 2023-02-09 NOTE — TELEPHONE ENCOUNTER
Lab orders faxed to [facility] at [phone]. Copy of orders also sent to patient.

## 2023-02-09 NOTE — TELEPHONE ENCOUNTER
Central Prior Authorization Team   Phone: [phone]    PA Initiation    Medication: meter device kit  Insurance Company: [employer] - Phone [phone] Fax [phone]  Pharmacy Filling the Rx: [facility] [place], [place] - [address] [place] [address]  Filling Pharmacy Phone: [phone]  Filling Pharmacy Fax:    Start Date: 2/9/2023

## 2023-02-09 NOTE — NURSING NOTE
Is the patient currently in the state of [place]? YES    Visit mode:VIDEO    If the visit is dropped, the patient can be reconnected by: VIDEO VISIT: Text to cell phone: [phone]    Will anyone else be joining the visit? NO      How would you like to obtain your AVS? MyChart    Are changes needed to the allergy or medication list? NO    Comments or concerns regarding today's visit: no    SARAH Celaya on 2/9/2023 at 2:24 PM

## 2023-02-09 NOTE — PATIENT INSTRUCTIONS
-Rexluti is discontinued as you are not taking the medication.  -Continue all other medications including Adderall XR 10 mg daily.    Your next appointment is scheduled on 2/23/2023 (Thu) at 11am.      **For crisis resources, please see the information at the end of this document**   Patient Education    Thank you for coming to the [facility] [place] [facility].     Lab Testing:  If you had lab testing today and your results are reassuring or normal they will be mailed to you or sent through [employer] within 7 days. If the lab tests need quick action we will call you with the results. The phone number we will call with results is # [phone]. If this is not the best number please call our clinic and change the number.     Medication Refills:  If you need any refills please call your pharmacy and they will contact us. Our fax number for refills is [phone].   Three business days of notice are needed for general medication refill requests.   Five business days of notice are needed for controlled substance refill requests.   If you need to change to a different pharmacy, please contact the new pharmacy directly. The new pharmacy will help you get your medications transferred.     Contact Us:  Please call [phone] during business hours (8-5:00 M-F).   If you have medication related questions after clinic hours, or on the weekend, please call [phone].     Financial Assistance [phone]   Medical Records [phone]       MENTAL HEALTH CRISIS RESOURCES:  For a emergency help, please call 911 or go to the nearest Emergency Department.     Emergency Walk-In Options:   [facility] Unit @ [facility] ([place]): [phone] - Specialized mental health emergency area designed to be calming  [facility] ([place]): [phone]  [facility] ([place]): [phone]  [facility] ([place]): [phone]    [facility] Crisis  Information:   [place]: [phone]  [place]: [phone]  [place] ([place]) - Adult: [phone]     Child: [phone]  [place] - Adult: [phone]     Child: [phone]  [place]: [phone]  List of all [facility] resources:   https://[place].gov/dhs/people-we-serve/adults/health-care/mental-health/resources/crisis-contacts.jsp    National Crisis Information:   Crisis Text Line: Text  [place]  to 655882  Suicide & Crisis Lifeline: 988  National Suicide Prevention Lifeline: 4-223-393-TALK (1-291.345.2878)       For online chat options, visit https://suicidepreventionlifeline.org/chat/  Poison Control Center: 1-290.699.7620  Trans Lifeline: 1-484.743.6483 - Hotline for transgender people of all ages  The [employer] Project: 2-500-498-0381 - Hotline for LGBT youth     For Non-Emergency Support:   Fast Tracker: Mental Health & Substance Use Disorder Resources -   https://www.Risetrackyavalun.org/

## 2023-02-10 RX ORDER — GABAPENTIN 300 MG/1
900 CAPSULE ORAL 3 TIMES DAILY
Qty: 270 CAPSULE | Refills: 0 | Status: SHIPPED | OUTPATIENT
Start: 2023-02-10 | End: 2023-02-23

## 2023-02-10 NOTE — TELEPHONE ENCOUNTER
Signed Prescriptions:                        Disp   Refills    gabapentin (NEURONTIN) 300 MG capsule      270 ca*0        Sig: Take 3 capsules (900 mg) by mouth 3 times daily  Authorizing Provider: TODD BUSTOS, RN CNP, FNP  [facility] Pain Management [facility]

## 2023-02-10 NOTE — TELEPHONE ENCOUNTER
Discussed situation with provider and social work and will have patient talk with the group home staff and see if the reported this on their own and if not patient will call Office of Health Facility Complaints at [phone] to report issue. Patient agrees with plan and will call if any issues.

## 2023-02-13 ENCOUNTER — VIRTUAL VISIT (OUTPATIENT)
Dept: FAMILY MEDICINE | Facility: CLINIC | Age: 33
End: 2023-02-13
Payer: COMMERCIAL

## 2023-02-13 DIAGNOSIS — E11.65 TYPE 2 DIABETES MELLITUS WITH HYPERGLYCEMIA, WITHOUT LONG-TERM CURRENT USE OF INSULIN (H): Primary | ICD-10-CM

## 2023-02-13 DIAGNOSIS — F12.20 CANNABIS DEPENDENCE (H): ICD-10-CM

## 2023-02-13 DIAGNOSIS — I10 HYPERTENSION, UNSPECIFIED TYPE: ICD-10-CM

## 2023-02-13 DIAGNOSIS — R11.2 NAUSEA AND VOMITING, UNSPECIFIED VOMITING TYPE: ICD-10-CM

## 2023-02-13 DIAGNOSIS — R35.89 POLYURIA: ICD-10-CM

## 2023-02-13 DIAGNOSIS — F51.3 SLEEP WALKING: ICD-10-CM

## 2023-02-13 DIAGNOSIS — R63.1 POLYDIPSIA: ICD-10-CM

## 2023-02-13 DIAGNOSIS — G47.9 SLEEP DISTURBANCE: ICD-10-CM

## 2023-02-13 PROCEDURE — 99214 OFFICE O/P EST MOD 30 MIN: CPT | Mod: VID | Performed by: NURSE PRACTITIONER

## 2023-02-13 NOTE — PROGRESS NOTES
Prakash is a 32 year old who is being evaluated via a billable video visit.      How would you like to obtain your AVS? MyChart  If the video visit is dropped, the invitation should be resent by: Text to cell phone: [phone]  Will anyone else be joining your video visit? No  Assessment & Plan     Type 2 diabetes mellitus with hyperglycemia, without long-term current use of insulin (H)  Plan to continue Ozempic.  May need to add additional medication in the future.  Consider insulin short-term if needed to get blood sugars under control  We will work with prior authorization team to get glucometer.  It is odd that a glucometer is not covered in a type II diabetic.   - UA reflex to Microscopic and Culture; Future  - AMB Adult Diabetes Educator Referral; Future  - Glucose; Future  - Med Therapy Management Referral    Sleep walking  Medications reviewed  Uncertain cause, likely multifactorial-mental health, cannabis use, antipsychotic medications.  - Med Therapy Management Referral    Sleep disturbance  Continue to work with psychiatry.    Polyuria  Suspect significantly elevated blood sugars.  We will work with prior authorization team to get glucometer     Polydipsia  Suspect significantly elevated blood sugars.  We will work with prior authorization team to get glucometer   May also be related to medications.  Referral placed to MTM    Cannabis dependence (H)  Encouraged to decrease use.    Hypertension, unspecified type  Stable-most recent blood pressure on file, controlled.   Encouraged to check blood pressures twice per week and report.     Nausea and vomiting, unspecified vomiting type  Again likely multifactorial-marijuana overuse, polypharmacy  Will refer to MTM  Most recent labs reviewed-stable  Has been evaluated by GI in the past for N/V and had upper endoscopy that was normal, last in 2018.   Will continue to monitor  Has been gaining weight, vomiting unlikely effecting nutritional status.     Review of  external notes as documented elsewhere in note  Review of the result(s) of each unique test - labs, scopes  Prescription drug management  27 minutes spent on the date of the encounter doing chart review, history and exam, documentation and further activities per the note       No follow-ups on file.    BROOKLYN Cruz CNP  [facility] SHAILESH Randall is a 32 year old, presenting for the following health issues:  Video Visit    HPI   * Waking frequently through the night and sleep walking. Ending up outside on occasion when it occurs. Unsure if side effect from medication. Vomiting at night also.    Delberttiffanie Hankinsmei CMA      Video visit completed due to COVID 19 outbreak.     Multiple concerns.  Is having trouble sleeping.  Is getting up approximately 5 times per night to urinate.  Wakes and mouth is very very dry.  Dry mouth will cause her to become very nauseated and will vomit.  Feels thirsty all of the time.  Is drinking a lot of water.  No increased hunger.  Metformin previously discontinued due to diarrhea.  Diarrhea has improved. Currently on Ozempic 0.25 mg weekly for 1 more week and then will go up to 0.5 mg.  Does occasionally self at home.  Thinks has gained approximately 15 pounds.  Last weight at home was around 250 pounds.  Prescription previously sent for glucometer.  Was not able to get from pharmacy.  Reports needs prior authorization completed in order to get glucometer.    Is also sleepwalking.  Has recognized that this occurs on days when mental health is worse.  Will go outside.  As soon as goes outside wakes up due to the cold.  Using marijuana, dab pens and flour.  Using 5+ times a day.  Continues to see psychiatry at least monthly.  Does have appointment coming up in 1 week.      Review of Systems   Constitutional: Positive for unexpected weight change (gain).   Gastrointestinal: Positive for nausea and vomiting.   Endocrine: Positive for polydipsia and polyuria.  Negative for polyphagia.   Psychiatric/Behavioral: Positive for sleep disturbance. Negative for self-injury and suicidal ideas.        Sleepwalking          Objective           Vitals:  No vitals were obtained today due to virtual visit.    Physical Exam  Constitutional:       General: He is not in acute distress.     Appearance: Normal appearance. He is not toxic-appearing.   HENT:      Nose: No congestion.   Eyes:      General: Lids are normal.   Pulmonary:      Effort: Pulmonary effort is normal. No tachypnea, bradypnea or respiratory distress.   Skin:     Coloration: Skin is not ashen, cyanotic, jaundiced or pale.   Neurological:      Mental Status: He is alert.   Psychiatric:         Mood and Affect: Mood normal.         Speech: Speech normal.         Behavior: Behavior normal. Behavior is cooperative.              Video-Visit Details    Type of service:  Video Visit   Video Start Time: 543   Video End Time:6:11 PM    Originating Location (pt. Location): [facility] Location (provider location):  On-site  [facility]

## 2023-02-13 NOTE — TELEPHONE ENCOUNTER
What is the status of this PA. It is odd that a type 2 diabetic needs a PA for a glucometer.     Becki CARVALHOC

## 2023-02-14 PROBLEM — R73.03 PREDIABETES: Status: RESOLVED | Noted: 2021-10-27 | Resolved: 2023-02-14

## 2023-02-14 ASSESSMENT — ENCOUNTER SYMPTOMS
SLEEP DISTURBANCE: 1
POLYPHAGIA: 0
UNEXPECTED WEIGHT CHANGE: 1
NAUSEA: 1
POLYDIPSIA: 1
VOMITING: 1

## 2023-02-14 NOTE — TELEPHONE ENCOUNTER
PRIOR AUTHORIZATION DENIED    Medication: meter device kit    Denial Date: 2/14/2023    Denial Rational:  Medicare Part D Insurance does not cover diabetic supplies. This will have to be billed through medical benefits (Medicare Part B).  This may be covered under Medicare Part A or Part B.  Patient can call 1-800-MEDICARE for more information. Central PA team does not process medical benefit PA's.                  Appeal Information:  N/A

## 2023-02-14 NOTE — TELEPHONE ENCOUNTER
Please notify patient. He would need to pay out of pocket for glucometer because he dose not have Part B or try to get part B coverage. I think it is imperative that he has a glucometer.     Becki CARVALHOC

## 2023-02-14 NOTE — TELEPHONE ENCOUNTER
Spoke to pt and he states that his insurance does have Part B but they told him he needs part D. Pt instructed to call insurance and inquire how he gets that, and also notified him he may need to pay out of pocket for it . Pt verbalized his understanding and denies further questions.     Belinda Krause RN  [facility]

## 2023-02-15 ENCOUNTER — TELEPHONE (OUTPATIENT)
Dept: FAMILY MEDICINE | Facility: CLINIC | Age: 33
End: 2023-02-15
Payer: MEDICARE

## 2023-02-15 NOTE — TELEPHONE ENCOUNTER
Forms received from: [facility]   Phone number listed: [phone]   Fax listed: [phone]  Date received:  2/14/23  Form description:  Home health certification & plan care  Once forms are completed, please return to [facility] via fax [phone].  Is patient requesting to be contacted when forms are completed: na  Phone: na  Form placed:  To Becki Sorto

## 2023-02-15 NOTE — TELEPHONE ENCOUNTER
Called [phone] (home). Did they answer the phone: No, left a message on voicemail to return call to the [facility] at [phone], and to ask for any available triage nurse.    Tran RN  Triage Nurse  [facility]: [facility]

## 2023-02-15 NOTE — TELEPHONE ENCOUNTER
Was able to obtain a glucometer and some test strips from our diabetic educator.  This is a short-term fix.  The most expensive part of the glucometer is the test strips.  Please call him, he may come to the clinic and  the Contour NexGen glucometer and 30 total strips.  I have also placed some diabetic education booklets in there as well.  Supplies placed at .  Would still recommend that he calls and gets things worked out with his insurance.    Becki Avery NP-C

## 2023-02-15 NOTE — TELEPHONE ENCOUNTER
Patient called back, below message relayed to patient. He verbalized understanding.    Kristin Machado RN

## 2023-02-16 ENCOUNTER — MYC MEDICAL ADVICE (OUTPATIENT)
Dept: FAMILY MEDICINE | Facility: CLINIC | Age: 33
End: 2023-02-16
Payer: MEDICARE

## 2023-02-16 DIAGNOSIS — E11.65 TYPE 2 DIABETES MELLITUS WITH HYPERGLYCEMIA, WITHOUT LONG-TERM CURRENT USE OF INSULIN (H): ICD-10-CM

## 2023-02-22 ENCOUNTER — TELEPHONE (OUTPATIENT)
Dept: PSYCHIATRY | Facility: CLINIC | Age: 33
End: 2023-02-22
Payer: MEDICARE

## 2023-02-22 ENCOUNTER — VIRTUAL VISIT (OUTPATIENT)
Dept: PHARMACY | Facility: CLINIC | Age: 33
End: 2023-02-22
Attending: NURSE PRACTITIONER
Payer: COMMERCIAL

## 2023-02-22 DIAGNOSIS — F25.0 SCHIZOAFFECTIVE DISORDER, BIPOLAR TYPE (H): ICD-10-CM

## 2023-02-22 DIAGNOSIS — L70.9 ACNE, UNSPECIFIED ACNE TYPE: ICD-10-CM

## 2023-02-22 DIAGNOSIS — I10 HYPERTENSION, UNSPECIFIED TYPE: ICD-10-CM

## 2023-02-22 DIAGNOSIS — E11.9 TYPE 2 DIABETES MELLITUS WITHOUT COMPLICATION, WITHOUT LONG-TERM CURRENT USE OF INSULIN (H): ICD-10-CM

## 2023-02-22 DIAGNOSIS — Z79.899 TRANSGENDER MAN ON HORMONE THERAPY: ICD-10-CM

## 2023-02-22 DIAGNOSIS — E78.5 HYPERLIPIDEMIA LDL GOAL <100: ICD-10-CM

## 2023-02-22 DIAGNOSIS — F51.3 SLEEP WALKING: Primary | ICD-10-CM

## 2023-02-22 DIAGNOSIS — F64.0 TRANSGENDER MAN ON HORMONE THERAPY: ICD-10-CM

## 2023-02-22 DIAGNOSIS — M54.9 BACK PAIN, UNSPECIFIED BACK LOCATION, UNSPECIFIED BACK PAIN LATERALITY, UNSPECIFIED CHRONICITY: ICD-10-CM

## 2023-02-22 DIAGNOSIS — G43.909 MIGRAINE WITHOUT STATUS MIGRAINOSUS, NOT INTRACTABLE, UNSPECIFIED MIGRAINE TYPE: ICD-10-CM

## 2023-02-22 PROCEDURE — 99207 PR NO CHARGE LOS: CPT | Mod: VID | Performed by: PHARMACIST

## 2023-02-22 NOTE — TELEPHONE ENCOUNTER
Called and checked with patient to see if he was able to get labs done yet and he states he is planing on doing them tomorrow. Double checked with the [facility] lab and they do have the order on file still. Writer will watch for results.

## 2023-02-22 NOTE — PROGRESS NOTES
Medication Therapy Management (MTM) Encounter    ASSESSMENT:                            Medication Adherence/Access: No issues identified    ***: ***    ***: ***    ***: ***    PLAN:                            ***    Follow-up: {followuptest2:859267}    SUBJECTIVE/OBJECTIVE:                          Ayana Prasad is a 32 year old adult contacted via secure video for an initial visit. He was referred to me from ***. [facility]     Reason for visit: ***.    Allergies/ADRs: {1/2/3/4/5:702958}  Past Medical History: {1/2/3/4/5:922674}  Tobacco: He reports that he quit smoking about 5 months ago. His smoking use included dip, chew, snus or snuff, other, cigars, and cigarettes. He started smoking about 11 years ago. He quit smokeless tobacco use about 3 years ago.  His smokeless tobacco use included chew. Cutting down on smoking, using nicotine gum  Alcohol: {ALCOHOL CONSUMPTION HX:980645}  {Social and Goals:327122}    Medication Adherence/Access: {fumedadherence:220634}    Sleep walking: reports this started 2 weeks ago, new onset sleep walking. However per chart review, 23 patient sent [employer] message reporting sleep walking x2 weeks prior (est start date .  Doesn't remember getting up at night. Occurs 2-3 times a week. Doesn't feel well rested. Getting up to use the restroom as well.   Recent medication changes:      Ozempic and testosterone.    Type 2 Diabetes:  Currently taking Ozempic 0.25mg every 7 days (4th injection, will be increasing to 0.5mg next week). Patient is not experiencing side effects.  Blood sugar monitorin time(s) daily. Ranges (patient reported): Fasting- 160-190.  130 lowest but typically   Symptoms of low blood sugar? none  Symptoms of high blood sugar? polydipsia and polyuria. Nausea, started before Ozempic.  Eye exam: {up to date:244428}  Foot exam: {up to date:229981}  Diet/Exercise: ***  Aspirin: {ASAyesno:004762}  Statin: {YES (EXPLAIN)/NO:958794}   ACEi/ARB: {YES  (EXPLAIN)/NO:132792}.   Urine Albumin: No results found for: UMALCR   Lab Results   Component Value Date    A1C 6.5 12/15/2022    A1C 6.4 08/29/2022    A1C 5.9 10/06/2021    A1C 5.8 03/19/2021    A1C 5.8 11/05/2018    A1C 5.8 10/03/2017     {IMMUNIZATION REMINDER LOOK IN NAVIGATOR:941909}     ***: testosterone injections going ok. Injections are subcutaneous. Plans to increase the dose next month.  Followed by Dr Samuels    ***: ***  Lithium 900mg at bedtime. Olanzapine has been effective. Hasn't been using propranolol.    Gabapentin somewhat effective. No use of muscle relaxant.    Excedrin migraine for headaches, uses 1x/month avg.    Doxycycline and Retin-A cream, benzoyl peroxide working well.     Today's Vitals: There were no vitals taken for this visit.  ----------------  {SOUTH?:724128}    I spent {mt total time 3:033873} with this patient today{MTMpartdbillingquestion:878582}. { :372333}. A copy of the visit note was provided to the patient's provider(s).    A summary of these recommendations {GIVEN/NOT GIVEN:409678}.    ***    Telemedicine Visit Details  Type of service:  Video Conference via [employer]  Start Time: 3:04 PM  End Time: 3:30 PM     Medication Therapy Recommendations  No medication therapy recommendations to display

## 2023-02-22 NOTE — PROGRESS NOTES
Medication Therapy Management (MTM) Encounter    ASSESSMENT:                            Medication Adherence/Access: No issues identified    Sleep walking: of the medications he is taking, antipsychotics are the most associated with sleep walking in a number of case reports.  Adderall has also been cited to cause sleep walking rarely.  However, the timing of medication changes does not seem to explain the new onset of sleep walking.  The patient had started Adderall around the time the behavior started but had taken Adderall for long periods of the time previously without issues. At this time, it seems unlikely a medication is causing sleep walking. Patient will follow-up with sleep specialists for further evaluation.     Type 2 Diabetes: A1c at goal <7% but recent glucose readings are not at goal fasting .  Encouraged reduction in carbohydrates, sweets and follow-up with CDE nutritionist. Based on reported symptoms, hyperglycemia appears to be causing sleep disturbance and wonder about possible impact on sleep walking behavior. Encouraged continuing titration of Ozempic as planned.     Hyperlipidemia: stable    Hypertension: blood pressure not at goal <130/80. If persistently elevated, consider addition of hydrochlorothiazide. ACE/ARB not preferred due to lithium.     Gender affirming hormone therapy: follow-up with Dr Bland as planned for dose titration of testosterone.     Mental health: discussed metabolic side effects with current therapies and management.  He will follow-up with psychiatry as planned regarding medication effiacy.     Back pain: adequate pain control    Migraine: stable    Acne: stable    PLAN:                            Unlikely for medications to be cause of sleep walking behavior    Follow-up: as needed    SUBJECTIVE/OBJECTIVE:                          Ayana Prasad is a 32 year old adult contacted via secure video for an initial visit. He was referred to me from Becki Avery  [place]ac[place].      Reason for visit: review medications for side effect of sleep walking.    Allergies/ADRs: Reviewed in chart  Past Medical History: Reviewed in chart  Tobacco: He reports that he quit smoking about 5 months ago. His smoking use included dip, chew, snus or snuff, other, cigars, and cigarettes. He started smoking about 11 years ago. He quit smokeless tobacco use about 3 years ago.  His smokeless tobacco use included chew. Cutting down on smoking, using nicotine gum  Alcohol: not currently using      Medication Adherence/Access: no issues reported    Sleep walking:  Patient reports 2 weeks ago had new onset sleep walking. Thinks this may be a side effect of either Ozempic or testosterone. However per chart review, this was reported as a symptom in [employer] message on  (states symptoms started 2 weeks prior, est onset 23)   Occurs 2-3 times a week. Has found himself outside without memory of getting there. Doesn't feel well rested which is a change, typically sleeps well. Notes he has been getting up to urinate more lately as well.   Has been referred to sleep medicine, appt scheduled.  Ozempic started 23  Testosterone started 23 - initially discussed testosterone as a possible contributor to sleep walking and thinks he remembers history of sleep behaviors with prior testosterone therapy    Recent medication changes prior to ~23 per chart review:  23 Adderall restarted  22 metformin discontinued due to diarrhea  - lithium ranged between 600 and 900mg  11/15/22 Rexulti 1mg daily started, unclear when patient stopped.(reported as stopped in visit on 23)    Type 2 Diabetes:  Currently taking Ozempic 0.25mg every 7 days (4th injection, will be increasing to 0.5mg next week). Patient is not experiencing side effects.  Blood sugar monitorin time(s) daily. Ranges (patient reported): Fasting- 160-190.  130 lowest  Symptoms of low blood sugar? none  Symptoms of high blood  sugar? polydipsia and polyuria. Nausea, started before Ozempic.  Eye exam: due  Foot exam: due  Diet/Exercise: did not review in detail. He reports having little knowledge on management of diabetes but planning to meet with diabetes education.   Aspirin: No  Statin: Yes: rosuvastatin   ACEi/ARB: No.   Urine Albumin: No results found for: UMALCR   Lab Results   Component Value Date    A1C 6.5 12/15/2022    A1C 6.4 08/29/2022    A1C 5.9 10/06/2021    A1C 5.8 03/19/2021    A1C 5.8 11/05/2018    A1C 5.8 10/03/2017     Hyperlipidemia: Current therapy includes rosuvastatin 10mg daily.  Patient reports no significant myalgias or other side effects.  The ASCVD Risk score (Chang FRANCIS, et al., 2019) failed to calculate for the following reasons:    The 2019 ASCVD risk score is only valid for ages 40 to 79  Recent Labs   Lab Test 12/15/22  0911 08/29/22  1558   CHOL 89 162   HDL 44 50   LDL 10 78   TRIG 173* 170*     Hypertension: Current medications include amlodipine 10mg daily.   Patient reports no current medication side effects.  BP Readings from Last 3 Encounters:   12/27/22 133/88   12/15/22 (!) 138/100   10/10/22 (!) 163/97      Gender affirming hormone therapy: testosterone injections going ok. Injections are subcutaneous. Plans to increase the dose next month. No reported side effects.  Followed by Dr Samuels    [facility]: Currently taking  Lithium ER 900mg at bedtime, olanzapine 20mg daily, Adderall XR 15mg daily. Reports olanzapine has been effective, no recent dose changes. Per chart review, multiple alternative antipsychotics have been recommended, including most recently Rexulti but he has not continued taking this. Hasn't been using propranolol.   Followed by [facility].     Back pain: Gabapentin 900mg 3 times a day somewhat effective. No use of muscle relaxant (metaxalone).     Migraine: Excedrin migraine for headache management, uses 1x/month avg.    Acne: currently taking Doxycycline,  Retin-A cream, benzoyl peroxide - combination is working well.     Today's Vitals: There were no vitals taken for this visit.  ----------------      I spent 25 minutes with this patient today.  A copy of the visit note was provided to the patient's provider(s).    A summary of these recommendations was sent via [employer].    Moriah Rojas, PharmD, BCACP   Medication Management Pharmacist   [facility]  [phone]     Telemedicine Visit Details  Type of service:  Video Conference via [employer]9 ([employer])  Start Time: 3:04 PM  End Time: 3:30 PM     Medication Therapy Recommendations  No medication therapy recommendations to display

## 2023-02-22 NOTE — Clinical Note
Reviewed medication history extensively around the time I think sleep walking started and doesn't seem to be related to medication.  See notes for details and let me know if you have questions

## 2023-02-23 ENCOUNTER — TELEPHONE (OUTPATIENT)
Dept: PSYCHIATRY | Facility: CLINIC | Age: 33
End: 2023-02-23
Payer: MEDICARE

## 2023-02-23 ENCOUNTER — VIRTUAL VISIT (OUTPATIENT)
Dept: PSYCHIATRY | Facility: CLINIC | Age: 33
End: 2023-02-23
Attending: NURSE PRACTITIONER
Payer: COMMERCIAL

## 2023-02-23 ENCOUNTER — MYC MEDICAL ADVICE (OUTPATIENT)
Dept: PSYCHIATRY | Facility: CLINIC | Age: 33
End: 2023-02-23
Payer: MEDICARE

## 2023-02-23 DIAGNOSIS — F51.05 INSOMNIA DUE TO OTHER MENTAL DISORDER: ICD-10-CM

## 2023-02-23 DIAGNOSIS — M51.369 DDD (DEGENERATIVE DISC DISEASE), LUMBAR: ICD-10-CM

## 2023-02-23 DIAGNOSIS — F25.0 SCHIZOAFFECTIVE DISORDER, BIPOLAR TYPE (H): ICD-10-CM

## 2023-02-23 DIAGNOSIS — F99 INSOMNIA DUE TO OTHER MENTAL DISORDER: ICD-10-CM

## 2023-02-23 DIAGNOSIS — F90.2 ATTENTION DEFICIT HYPERACTIVITY DISORDER (ADHD), COMBINED TYPE: ICD-10-CM

## 2023-02-23 DIAGNOSIS — F90.2 ATTENTION DEFICIT HYPERACTIVITY DISORDER (ADHD), COMBINED TYPE: Primary | ICD-10-CM

## 2023-02-23 PROCEDURE — 99215 OFFICE O/P EST HI 40 MIN: CPT | Mod: VID | Performed by: NURSE PRACTITIONER

## 2023-02-23 RX ORDER — LITHIUM CARBONATE 300 MG/1
900 TABLET, FILM COATED, EXTENDED RELEASE ORAL AT BEDTIME
Qty: 90 TABLET | Refills: 1 | Status: SHIPPED | OUTPATIENT
Start: 2023-02-23 | End: 2023-03-16

## 2023-02-23 RX ORDER — DEXTROAMPHETAMINE SACCHARATE, AMPHETAMINE ASPARTATE MONOHYDRATE, DEXTROAMPHETAMINE SULFATE AND AMPHETAMINE SULFATE 3.75; 3.75; 3.75; 3.75 MG/1; MG/1; MG/1; MG/1
15 CAPSULE, EXTENDED RELEASE ORAL DAILY
Qty: 30 CAPSULE | Refills: 0 | Status: SHIPPED | OUTPATIENT
Start: 2023-02-23 | End: 2023-02-23

## 2023-02-23 RX ORDER — DEXTROAMPHETAMINE SACCHARATE, AMPHETAMINE ASPARTATE MONOHYDRATE, DEXTROAMPHETAMINE SULFATE AND AMPHETAMINE SULFATE 1.25; 1.25; 1.25; 1.25 MG/1; MG/1; MG/1; MG/1
15 CAPSULE, EXTENDED RELEASE ORAL DAILY
Qty: 90 CAPSULE | Refills: 0 | Status: SHIPPED | OUTPATIENT
Start: 2023-02-23 | End: 2023-02-23

## 2023-02-23 RX ORDER — OLANZAPINE 20 MG/1
20 TABLET ORAL AT BEDTIME
Qty: 30 TABLET | Refills: 1 | Status: SHIPPED | OUTPATIENT
Start: 2023-02-23 | End: 2023-03-16

## 2023-02-23 RX ORDER — DEXTROAMPHETAMINE SACCHARATE, AMPHETAMINE ASPARTATE MONOHYDRATE, DEXTROAMPHETAMINE SULFATE AND AMPHETAMINE SULFATE 3.75; 3.75; 3.75; 3.75 MG/1; MG/1; MG/1; MG/1
15 CAPSULE, EXTENDED RELEASE ORAL DAILY
Qty: 30 CAPSULE | Refills: 0 | Status: SHIPPED | OUTPATIENT
Start: 2023-02-23 | End: 2023-04-13

## 2023-02-23 RX ORDER — GABAPENTIN 300 MG/1
900 CAPSULE ORAL 3 TIMES DAILY
Qty: 270 CAPSULE | Refills: 0 | Status: SHIPPED | OUTPATIENT
Start: 2023-02-23 | End: 2023-04-12
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
## 2023-02-23 NOTE — TELEPHONE ENCOUNTER
Writer called pharmacy to ensure that patient's insurance would cover 5 mg capsule prescription. Pharmacy staff reported they had told the patient that they had 15 mg capsules in their order for tomorrow and that patient's insurance would not cover the 5 mg capsules.     Pharmacy staff also confirmed that previously sent 15 mg prescription was canceled out. Writer asked that the 5 mg prescription be canceled as it was not covered.

## 2023-02-23 NOTE — PATIENT INSTRUCTIONS
-Increase Adderall XR to 15 mg daily for ADHD. Monitor blood pressure 2 times a week and report them back to mattie thomas. Also pls monitor changes in anxiety and sleep.  -Continue all other mediation regimen.    Your next appointment is scheduled on 3/16/2023 (Thu) at 1:30pm.    Thank you for coming to the [facility] [place] [facility].    Lab Testing:  If you had lab testing today and your results are reassuring or normal they will be mailed to you or sent through [facility] within 7 days. If the lab tests need quick action we will call you with the results. The phone number we will call with results is # [phone] (home) . If this is not the best number please call our clinic and change the number.    Medication Refills:  If you need any refills please call your pharmacy and they will contact us. Our fax number for refills is [phone]. Please allow three business for refill processing. If you need to  your refill at a new pharmacy, please contact the new pharmacy directly. The new pharmacy will help you get your medications transferred.     Scheduling:  If you have any concerns about today's visit or wish to schedule another appointment please call our office during normal business hours [phone] (8-5:00 M-F)    Contact Us:  Please call [phone] during business hours (8-5:00 M-F).  If after clinic hours, or on the weekend, please call  [phone].    Financial Assistance [phone]  [facility] Billing [phone]  Central Billing Office, [facility]: [phone]  [facility] Billing [phone]  Medical Records [phone]      MENTAL HEALTH CRISIS NUMBERS:  For a medical emergency please call  911 or go to the nearest ER.     [facility]:   [facility] -[phone]   Crisis Residence [facility] -[phone]   Walk-In Counseling Center [facility] -[phone]   COPE 24/7 [place] Mobile Team -[phone] (adults)/[phone]  (child)  CHILD: [facility] needs assessment team - [phone]      [facility]:   [facility] - [phone]   Walk-in counseling [facility] - [phone]   Walk-in counseling [facility] - [phone]   Crisis Residence [facility] - [phone]  Urgent Care Adult Mental [facility]-[phone] mobile unit/ 24/7 crisis line    National Crisis Numbers:   National Suicide Prevention Lifeline: 0-569-680-TALK (457-040-5802)  Poison Control Center - 3-965-946-8136  [facility]/resources for a list of additional resources (SOS)  Trans Lifeline a hotline for transgender people 3-244-615-0591  The [employer] Project a hotline for LGBT youth 1-270-888-4026  Crisis Text Line: For any crisis 24/7   To: 405826  see www.crisistextline.org  - IF MAKING A CALL FEELS TOO HARD, send a text!         Again thank you for choosing [facility]ENT[facility] & AD[facility] and please let us know how we can best partner with you to improve you and your family's health.    You may be receiving a survey regarding this appointment. We would love to have your feedback, both positive and negative. The survey is done by an external company, so your answers are anonymous.

## 2023-02-23 NOTE — TELEPHONE ENCOUNTER
Writer called [facility] pharmacy to cancel previously sent Adderall prescription.

## 2023-02-24 RX ORDER — LANCETS
EACH MISCELLANEOUS
Qty: 100 EACH | Refills: 3 | Status: CANCELLED | OUTPATIENT
Start: 2023-02-24

## 2023-03-02 NOTE — PATIENT INSTRUCTIONS
"Recommendations from today's MTM visit:                                                    MTM (medication therapy management) is a service provided by a clinical pharmacist designed to help you get the most of out of your medicines.   Today we reviewed what your medicines are for, how to know if they are working, that your medicines are safe and how to make your medicine regimen as easy as possible.      I reviewed your medication history for possible causes of sleep walking.      At the time of the visit, we thought the sleep walking had started after you were taking Ozempic and testosterone but when I look back in your chart, I see a Press[employer] message reporting the sleep walking before you were taking those medicines.     I don't see a medication around the time you were reporting sleep walking that would explain why you started having that behavior.  Please follow-up with sleep medicine as planned to discuss other possible causes.    Follow-up: as needed    It was great speaking with you today.  I value your experience and would be very thankful for your time in providing feedback in our clinic survey. In the next few days, you may receive an email or text message from [employer] with a link to a survey related to your  clinical pharmacist.\"     To schedule another MTM appointment, please call the clinic directly or you may call the MTM scheduling line at [phone] or toll-free at [phone].     My Clinical Pharmacist's contact information:                                                      Please feel free to contact me with any questions or concerns you have.      Moriah Rojas, PharmD, BCACP   Medication Management Pharmacist   [facility]  [phone]    "

## 2023-03-07 ENCOUNTER — TELEPHONE (OUTPATIENT)
Dept: FAMILY MEDICINE | Facility: CLINIC | Age: 33
End: 2023-03-07

## 2023-03-07 NOTE — TELEPHONE ENCOUNTER
Pt calling in regarding his BG .     Pt states it is 207 .   Pt reports that he feels mostly normal is experiencing increased thirst and is he is taking his Ozempic  and jittery but has not eaten today.     Pt states he normally runs around 120    RN reviewed recent notes from 2/13/2023 visit which states the following:      Type 2 diabetes mellitus with hyperglycemia, without long-term current use of insulin (H)  Plan to continue Ozempic.  May need to add additional medication in the future.  Consider insulin short-term if needed to get blood sugars under control  We will work with prior authorization team to get glucometer.  It is odd that a glucometer is not covered in a type II diabetic.   - UA reflex to Microscopic and Culture; Future  - AMB Adult Diabetes Educator Referral; Future  - Glucose; Future  - Med Therapy Management Referral        Instructions       Return in about 1 month (around 3/13/2023), or if symptoms worsen or fail to improve.      Pt placed on schedule next week with PCP for Diabetes recheck.     Red flag symptoms reviewed with pt when to seek immediate care such as :   - very weak and tired   - Severe headache   -vision changes   - fruity breath  - belly pain or vomiting.     Pt agreeable with plan of care and verbalized understanding.     Belinda Krause, SRINIVAS  [facility]

## 2023-03-09 ENCOUNTER — VIRTUAL VISIT (OUTPATIENT)
Dept: EDUCATION SERVICES | Facility: OTHER | Age: 33
End: 2023-03-09
Payer: COMMERCIAL

## 2023-03-09 DIAGNOSIS — E11.65 TYPE 2 DIABETES MELLITUS WITH HYPERGLYCEMIA, WITHOUT LONG-TERM CURRENT USE OF INSULIN (H): ICD-10-CM

## 2023-03-09 DIAGNOSIS — E11.65 TYPE 2 DIABETES MELLITUS WITH HYPERGLYCEMIA, WITHOUT LONG-TERM CURRENT USE OF INSULIN (H): Primary | ICD-10-CM

## 2023-03-09 PROCEDURE — G0108 DIAB MANAGE TRN  PER INDIV: HCPCS | Mod: AE | Performed by: DIETITIAN, REGISTERED

## 2023-03-09 RX ORDER — LANCETS
EACH MISCELLANEOUS
Qty: 100 EACH | Refills: 3 | Status: SHIPPED | OUTPATIENT
Start: 2023-03-09 | End: 2024-08-12

## 2023-03-09 NOTE — TELEPHONE ENCOUNTER
Jarad Connell,    I recommend starting insulin based on Randall's blood sugars and symptoms of hyperglycemia.    Blood sugars are limited to how often he is checking and pt willing to make diet changes, so I am recommending 0.1 units per kg to start.  Pt instructed on how to use.    If in agreement, please sign.    Thanks!    ANDREA Cedeño [facility]  [phone]

## 2023-03-09 NOTE — CONFIDENTIAL NOTE
Date Breakfast  Lunch  Dinner  Bedtime    Before After Before After Before After    3/8     211     3/7    207      2/22   283       2/21   233       Been on higher ozempic dose for 2 weeks 0.5mg   Drinks a lot of milk and water, very thirsty.  Drinking up to a gallon of milk per day as very thirsty.  Milk and water mostly at night.    Says lives in group home - says good choices limited, but working on this to get more funding to purchase more foods    Diabetes Self-Management Education & Support    Presents for: Individual review    Type of service:  Video Visit    If the video visit is dropped, the video visit invitation should be resent by: Text to cell phone: [phone]    Originating Location (pt. Location): Home  Distant Location (provider location): Home  Mode of Communication:  Video Conference via [employer]      How would patient like to obtain AVS? MyChart      Assessment Type:   ASSESSMENT:  Pt reports his blood sugars have increased to in the 200s and having symptoms of hyperglycemia.  Says PCP did mention he may need insulin.  Pt was newly dx with DM back in Dec and has not had DM education.  Did not tolerate metformin.  Doing well on Ozempic 0.5 mg weekly and has been on this dose for 2 weeks since upping it from 0.25 mg weekly.  Testing blood sugars has been difficult as he has had hard time getting supplies covered by insurance.    Patient's most recent   Lab Results   Component Value Date    A1C 6.5 12/15/2022    A1C 5.8 03/19/2021     is meeting goal of <7.0  - but will be more elevated now    Diabetes knowledge and skills assessment:   Patient is knowledgeable in diabetes management concepts related to: Monitoring    Continue education with the following diabetes management concepts: Healthy Eating and Taking Medication    Based on learning assessment above, most appropriate setting for further diabetes education would be: Individual setting.      PLAN    Taught insulin today and recommend  "start lantus at 0.1 units per kg  Ordered testing supplies through [facility] to see if they can help    Encouraged eating 3 times per day and following myplate method; reducing milk intake    Topics to cover at upcoming visits: Healthy Eating, Being Active, Monitoring and Taking Medication    Follow-up: 2 weeks for blood sugar review and insulin adjustment    See Care Plan for co-developed, patient-state behavior change goals.  AVS provided for patient today.    Education Materials Provided:  [facility] Understanding Diabetes Booklet, Safe Disposal Options for Needles & Syringes, Hypoglycemia Signs and Symptoms and My Plate Planner      SUBJECTIVE/OBJECTIVE:  Presents for: Individual review  Accompanied by: Self  Diabetes education in the past 24mo: No  Cultural Influences/Ethnic Background:  Not  or       Diabetes Symptoms & Complications:  Fatigue: No  Neuropathy: No  Polydipsia: Yes  Polyphagia: Yes  Polyuria: Yes  Visual change: No  Slow healing wounds: No  Weight trend: Stable  Complications assessed today?: No    Patient Problem List and Family Medical History reviewed for relevant medical history, current medical status, and diabetes risk factors.    Vitals:  There were no vitals taken for this visit.  Estimated body mass index is 39.51 kg/m  as calculated from the following:    Height as of 12/27/22: 1.66 m (5' 5.35\").    Weight as of 12/27/22: 108.9 kg (240 lb).   Last 3 BP:   BP Readings from Last 3 Encounters:   12/27/22 133/88   12/15/22 (!) 138/100   10/10/22 (!) 163/97       History   Smoking Status     Former     Packs/day: 0.00     Years: 5.00     Types: Dip, chew, snus or snuff, Other, Cigars, Cigarettes     Start date: 8/1/2011     Quit date: 9/14/2022   Smokeless Tobacco     Former     Types: Chew     Quit date: 12/17/2019       Labs:  Lab Results   Component Value Date    A1C 6.5 12/15/2022    A1C 5.8 03/19/2021     Lab Results   Component Value Date     " 01/16/2023     05/19/2021     Lab Results   Component Value Date    LDL 10 12/15/2022    LDL 81 04/28/2021     HDL Cholesterol   Date Value Ref Range Status   04/28/2021 37 (L) >40 mg/dL Final     Direct Measure HDL   Date Value Ref Range Status   12/15/2022 44 >=40 mg/dL Final   ]  GFR Estimate   Date Value Ref Range Status   01/16/2023 >90 >60 mL/min/1.73m2 Final     Comment:     GFR not calculated when sex unspecified or nonbinary.  Effective December 21, 2021 eGFRcr in adults is calculated using the 2021 CKD-EPI creatinine equation which includes age and gender (Terrell et al., NEJ, DOI: 10.1056/JSHVfp9504062)   05/19/2021 >90 >60 mL/min/[1.73_m2] Final     Comment:     Non  GFR Calc  Starting 12/18/2018, serum creatinine based estimated GFR (eGFR) will be   calculated using the Chronic Kidney Disease Epidemiology Collaboration   (CKD-EPI) equation.       GFR Estimate If Black   Date Value Ref Range Status   05/19/2021 >90 >60 mL/min/[1.73_m2] Final     Comment:      GFR Calc  Starting 12/18/2018, serum creatinine based estimated GFR (eGFR) will be   calculated using the Chronic Kidney Disease Epidemiology Collaboration   (CKD-EPI) equation.       Lab Results   Component Value Date    CR 0.62 01/16/2023    CR 0.81 05/19/2021     No results found for: MICROALBUMIN    Healthy Eating:  Healthy Eating Assessed Today: Yes  Cultural/Rastafarian diet restrictions?: Yes (allergic to nuts EXCEPT peanuts.  Doesn't eat meat except ground beef)  Meals include: Dinner  Breakfast: dont every eat breakfast  Lunch: 5 chicken tenders or doesn't eat lunch - says he should start to eat lunch  Dinner: Biggest meal (4 tacos, chicken sandoval, 2 bagels with butter) - says feels hungry from medical cannabis  Snacks: no snacks  Beverages: Water, Milk (Drinking about a gallon a day of milk)  Has patient met with a dietitian in the past?: No    Being Active:  Being Active Assessed Today: Yes  Exercise::  Currently not exercising    Monitoring:  Monitoring Assessed Today: Yes  Did patient bring glucose meter to appointment? : Yes  Times checking blood sugar at home (number): 1  Times checking blood sugar at home (per):  (not daily)  Blood glucose trend: Increasing    See top of note    Taking Medications:  Diabetes Medication(s)     Incretin Mimetic Agents       semaglutide (OZEMPIC, 0.25 OR 0.5 MG/DOSE,) 2 MG/1.5ML SOPN pen    Inject 0.25 mg Subcutaneous every 7 days for 30 days, THEN 0.5 mg every 7 days for 90 days.          Taking Medication Assessed Today: Yes  Current Treatments: Diet, Non-insulin Injectables  Problems taking diabetes medications regularly?: No  Diabetes medication side effects?: No    Problem Solving:  Problem Solving Assessed Today: Yes              Reducing Risks:  Reducing Risks Assessed Today: No    Healthy Coping:  Healthy Coping Assessed Today: Yes  Emotional response to diabetes: Ready to learn, Acceptance  Stage of change: ACTION (Actively working towards change)  Patient Activation Measure Survey Score:  CATIE Score (Last Two) 4/15/2013   CATIE Raw Score 52   Activation Score 100   CATIE Level 4         Care Plan and Education Provided:  Insulin administration technique taught today. Patient verbalized understanding and was able to perform an accurate return demonstration of administration technique.  Care Plan: Diabetes   Updates made by Anabel Chris RD since 3/9/2023 12:00 AM      Problem: HbA1C Not In Goal       Goal: Establish Regular Follow-Ups with PCP       Task: Discuss with PCP the recommended timing for patient's next follow up visit(s)    Responsible User: Anabel Chris RD      Task: Discuss schedule for PCP visits with patient    Responsible User: Anabel Chris RD      Goal: Get HbA1C Level in Goal       Task: Educate patient on diabetes education self-management topics    Responsible User: Anabel Chris RD      Task: Educate patient on benefits of regular  glucose monitoring    Responsible User: Anabel Chris RD      Task: Refer patient to appropriate extended care team member, as needed (Medication Therapy Management, Behavioral Health, Physical Therapy, etc.)    Responsible User: Anabel Chris RD      Task: Discuss diabetes treatment plan with patient    Responsible User: Anabel Chris RD      Problem: Diabetes Self-Management Education Needed to Optimize Self-Care Behaviors       Goal: Understand diabetes pathophysiology and disease progression       Task: Provide education on diabetes pathophysiology and disease progression specfic to patient's diabetes type    Responsible User: Anabel Chris RD      Goal: Healthy Eating - follow a healthy eating pattern for diabetes    Start Date: 3/9/2023   This Visit's Progress: 0%   Note:    Limit milk to 2-3 cups per day and drink sugar free beverages and water      Task: Provide education on portion control and consistency in amount, composition and timing of food intake Completed 3/9/2023   Responsible User: Anabel Chris RD      Task: Provide education on managing carbohydrate intake (carbohydrate counting, plate planning method, etc.) Completed 3/9/2023   Responsible User: Anabel Chris RD      Task: Provide education on weight management    Responsible User: Anabel Chris RD      Task: Provide education on heart healthy eating    Responsible User: Anabel Chris RD      Task: Provide education on eating out    Responsible User: Anabel Chris RD      Task: Develop individualized healthy eating plan with patient    Responsible User: Anabel Chris RD      Goal: Being Active - get regular physical activity, working up to at least 150 minutes per week       Task: Provide education on relationship of activity to glucose and precautions to take if at risk for low glucose    Responsible User: Anabel Chris RD      Task: Discuss barriers to physical activity with patient     Responsible User: Anabel Chris RD      Task: Develop physical activity plan with patient    Responsible User: Anabel Chris RD      Task: Explore community resources including walking groups, assistance programs, and home videos    Responsible User: Anabel Chris RD      Goal: Monitoring - monitor glucose and ketones as directed       Task: Provide education on blood glucose monitoring (purpose, proper technique, frequency, glucose targets, interpreting results, when to use glucose control solution, sharps disposal) Completed 3/9/2023   Responsible User: Anabel Chris RD      Task: Provide education on continuous glucose monitoring (sensor placement, use of hiren or /reader, understanding glucose trends, alerts and alarms, differences between sensor glucose and blood glucose)    Responsible User: Anabel Chris RD      Task: Provide education on ketone monitoring (when to monitor, frequency, etc.)    Responsible User: Anabel Chris RD      Goal: Taking Medication - patient is consistently taking medications as directed       Task: Provide education on action of prescribed medication, including when to take and possible side effects    Responsible User: Anabel Chris RD      Task: Provide education on insulin and injectable diabetes medications, including administration, storage, site selection and rotation for injection sites    Responsible User: Anabel Chris RD      Task: Discuss barriers to medication adherence with patient and provide management technique ideas as appropriate    Responsible User: Anabel Chris RD      Task: Provide education on frequency and refill details of medications    Responsible User: Anabel Chris RD      Goal: Problem Solving - know how to prevent and manage short-term diabetes complications       Task: Provide education on high blood glucose - causes, signs/symptoms, prevention and treatment    Responsible User: Anabel Chris  ANDREA ROMANO      Task: Provide education on low blood glucose - causes, signs/symptoms, prevention, treatment, carrying a carbohydrate source at all times, and medical identification    Responsible User: Anabel Chris RD      Task: Provide education on safe travel with diabetes    Responsible User: Anabel Chris RD      Task: Provide education on how to care for diabetes on sick days    Responsible User: Anabel Chris RD      Task: Provide education on when to call a health care provider    Responsible User: Anabel Chris RD      Goal: Reducing Risks - know how to prevent and treat long-term diabetes complications       Task: Provide education on major complications of diabetes, prevention, early diagnostic measures and treatment of complications    Responsible User: Anabel Chris RD      Task: Provide education on recommended care for dental, eye and foot health    Responsible User: Anabel Chris RD      Task: Provide education on Hemoglobin A1c - goals and relationship to blood glucose levels    Responsible User: Anabel Chris RD      Task: Provide education on recommendations for heart health - lipid levels and goals, blood pressure and goals, and aspirin therapy, if indicated    Responsible User: Anabel Chris RD      Task: Provide education on tobacco cessation    Responsible User: Anabel Chris RD      Goal: Healthy Coping - use available resources to cope with the challenges of managing diabetes       Task: Discuss recognizing feelings about having diabetes    Responsible User: Anabel Chris RD      Task: Provide education on the benefits of making appropriate lifestyle changes    Responsible User: Anabel Chris RD      Task: Provide education on benefits of utilizing support systems    Responsible User: Anabel Chris RD      Task: Discuss methods for coping with stress    Responsible User: Anabel Chris RD      Task: Provide education on when to seek  professional counseling    Responsible User: Anabel Chris RD              Time Spent: 38 minutes  Encounter Type: Individual    Any diabetes medication dose changes were made via the CDE Protocol per the patient's referring provider. A copy of this encounter was shared with the provider.    Anabel Chris RD [facility]  [phone]

## 2023-03-09 NOTE — PATIENT INSTRUCTIONS
"Start insulin lantus (or pharmacy will sub equivalent)    You will still take your Ozempic as well    Taking Insulin:    1. Take Lantus - 11 units at bedtime.     - Rotate injection sites, keeping at least 1 inch apart from last injection site and 2 inches away from belly button or surgical scars.    -If you have a cloudy insulin, mix the insulin gently by rolling between your hands 10 times and turning upside down and right side up 10 times. Do not shake.     2. Pen - Use a new pen needle for each injection. Always remove pen needle from the insulin pen after use and do not store insulin pens with the needle on the pen. Do a 2 unit \"prime\" before each injection, be sure a drop or stream of insulin comes out of the needle before you give your injection. After you inject, hold the needle under the skin to the count of 10 to be sure all of the insulin goes in.      3. Store insulin you are not using in the refrigerator (do not freeze). Take new insulin out of the refrigerator a few hours prior to use to bring to room temperature.     4. Once opened Lantus can be kept at room temperature for 28 days.. Do not use the opened insulin after this time has passed, even if there is still medicine inside.     5. Always carry your blood sugar meter and a sugar source like glucose tablets with you in case of a low blood sugar. Treat a low blood sugar (less than 70) with 15 grams of carbohydrate (1 carb choice). Wait 15 minutes, recheck blood sugar. If blood sugar is still below 70, repeat the treatment.    6. Wear Medical ID or carry a wallet card stating you have Diabetes.    7. Call your doctor or diabetes educator if you begin having low blood sugars or if you have questions or concerns.     Anabel Chris RD LD [facility]  [phone]     "

## 2023-03-09 NOTE — LETTER
3/9/2023         RE: Ayana Prasad  [address] [place]  [place] [address]        Dear Colleague,    Thank you for referring your patient, Ayana Prasad, to the [facility]. Please see a copy of my visit note below.    No notes on file

## 2023-03-10 RX ORDER — BLOOD PRESSURE TEST KIT
KIT MISCELLANEOUS
Qty: 120 EACH | Refills: 0 | Status: SHIPPED | OUTPATIENT
Start: 2023-03-10 | End: 2023-05-26

## 2023-03-11 ENCOUNTER — NURSE TRIAGE (OUTPATIENT)
Dept: NURSING | Facility: CLINIC | Age: 33
End: 2023-03-11
Payer: MEDICARE

## 2023-03-11 NOTE — TELEPHONE ENCOUNTER
Call from patient who reports current blood sugar of 98 at 5:30 pm he is asking if he should inject 11 units of Lantus tonight.     Patient reports his blood sugar runs to about 200 in the morning. He only checks when he feels off so not a lot of information about when he his glucose tends to be high. A1C 2  Months ago was 6.5. There is no direction to hold his Lantus for any reason. Advised he should be ok to inject current  Lantus. Informed to check his blood sugar in the morning to see what it is and report to his PCP if it tends to drop then they may make changes. Patient verbalized understanding.      Reason for Disposition    Caller has medicine question only, adult not sick, AND triager answers question    Protocols used: MEDICATION QUESTION CALL-A-

## 2023-03-13 NOTE — TELEPHONE ENCOUNTER
Orders for insulin start signed by Linh Chase.    Closing encounter.    Sydney Marquez RN, [facility]

## 2023-03-16 ENCOUNTER — VIRTUAL VISIT (OUTPATIENT)
Dept: PSYCHIATRY | Facility: CLINIC | Age: 33
End: 2023-03-16
Attending: NURSE PRACTITIONER
Payer: COMMERCIAL

## 2023-03-16 ENCOUNTER — VIRTUAL VISIT (OUTPATIENT)
Dept: FAMILY MEDICINE | Facility: CLINIC | Age: 33
End: 2023-03-16
Payer: COMMERCIAL

## 2023-03-16 DIAGNOSIS — F51.05 INSOMNIA DUE TO OTHER MENTAL DISORDER: ICD-10-CM

## 2023-03-16 DIAGNOSIS — F99 INSOMNIA DUE TO OTHER MENTAL DISORDER: ICD-10-CM

## 2023-03-16 DIAGNOSIS — F41.9 ANXIETY: ICD-10-CM

## 2023-03-16 DIAGNOSIS — F90.2 ATTENTION DEFICIT HYPERACTIVITY DISORDER (ADHD), COMBINED TYPE: Primary | ICD-10-CM

## 2023-03-16 DIAGNOSIS — F25.0 SCHIZOAFFECTIVE DISORDER, BIPOLAR TYPE (H): ICD-10-CM

## 2023-03-16 DIAGNOSIS — R25.1 TREMOR: ICD-10-CM

## 2023-03-16 DIAGNOSIS — E11.65 TYPE 2 DIABETES MELLITUS WITH HYPERGLYCEMIA, WITHOUT LONG-TERM CURRENT USE OF INSULIN (H): Primary | ICD-10-CM

## 2023-03-16 PROCEDURE — 99213 OFFICE O/P EST LOW 20 MIN: CPT | Mod: VID | Performed by: NURSE PRACTITIONER

## 2023-03-16 PROCEDURE — 99214 OFFICE O/P EST MOD 30 MIN: CPT | Mod: VID | Performed by: NURSE PRACTITIONER

## 2023-03-16 RX ORDER — PROPRANOLOL HYDROCHLORIDE 10 MG/1
10 TABLET ORAL 2 TIMES DAILY PRN
Qty: 60 TABLET | Refills: 1 | Status: SHIPPED | OUTPATIENT
Start: 2023-03-16 | End: 2023-04-13

## 2023-03-16 RX ORDER — PROCHLORPERAZINE 25 MG/1
SUPPOSITORY RECTAL
Qty: 1 EACH | Refills: 0 | Status: SHIPPED | OUTPATIENT
Start: 2023-03-16 | End: 2023-05-26

## 2023-03-16 RX ORDER — OLANZAPINE 20 MG/1
20 TABLET ORAL AT BEDTIME
Qty: 30 TABLET | Refills: 1 | Status: SHIPPED | OUTPATIENT
Start: 2023-03-16 | End: 2023-04-13

## 2023-03-16 RX ORDER — DEXTROAMPHETAMINE SACCHARATE, AMPHETAMINE ASPARTATE MONOHYDRATE, DEXTROAMPHETAMINE SULFATE AND AMPHETAMINE SULFATE 5; 5; 5; 5 MG/1; MG/1; MG/1; MG/1
20 CAPSULE, EXTENDED RELEASE ORAL DAILY
Qty: 30 CAPSULE | Refills: 0 | Status: SHIPPED | OUTPATIENT
Start: 2023-03-16 | End: 2023-04-13

## 2023-03-16 RX ORDER — DEXTROAMPHETAMINE SACCHARATE, AMPHETAMINE ASPARTATE MONOHYDRATE, DEXTROAMPHETAMINE SULFATE AND AMPHETAMINE SULFATE 5; 5; 5; 5 MG/1; MG/1; MG/1; MG/1
20 CAPSULE, EXTENDED RELEASE ORAL DAILY
Qty: 30 CAPSULE | Refills: 0 | Status: SHIPPED | OUTPATIENT
Start: 2023-03-16 | End: 2023-03-16

## 2023-03-16 RX ORDER — LITHIUM CARBONATE 300 MG/1
900 TABLET, FILM COATED, EXTENDED RELEASE ORAL AT BEDTIME
Qty: 90 TABLET | Refills: 1 | Status: SHIPPED | OUTPATIENT
Start: 2023-03-16 | End: 2023-04-13

## 2023-03-16 RX ORDER — PROCHLORPERAZINE 25 MG/1
SUPPOSITORY RECTAL
Qty: 12 EACH | Refills: 4 | Status: SHIPPED | OUTPATIENT
Start: 2023-03-16 | End: 2023-05-26

## 2023-03-16 ASSESSMENT — PATIENT HEALTH QUESTIONNAIRE - PHQ9
SUM OF ALL RESPONSES TO PHQ QUESTIONS 1-9: 8
SUM OF ALL RESPONSES TO PHQ QUESTIONS 1-9: 8
10. IF YOU CHECKED OFF ANY PROBLEMS, HOW DIFFICULT HAVE THESE PROBLEMS MADE IT FOR YOU TO DO YOUR WORK, TAKE CARE OF THINGS AT HOME, OR GET ALONG WITH OTHER PEOPLE: SOMEWHAT DIFFICULT

## 2023-03-16 ASSESSMENT — ENCOUNTER SYMPTOMS: POLYPHAGIA: 1

## 2023-03-16 NOTE — PATIENT INSTRUCTIONS
-Increase Adderall XR to 20 mg daily for ADHD. Monitor blood pressure 2 times a week and report them back to mattie thomas. Also pls monitor changes in psychosis, anxiety and sleep.  -Continue all other mediation regimen.    -Please complete lithium lab as soon as you are able.    Links to comment on BINTA proposed change for controlled substance and telehealth. https://www.regulations.gov/commenton/IKU-1615-9966-0001    Your next appointment is scheduled on 4/13/2023 (Thu) at 1:30pm.      **For crisis resources, please see the information at the end of this document**   Patient Education    Thank you for coming to the [facility] [place] [facility].     Lab Testing:  If you had lab testing today and your results are reassuring or normal they will be mailed to you or sent through [employer] within 7 days. If the lab tests need quick action we will call you with the results. The phone number we will call with results is # [phone]. If this is not the best number please call our clinic and change the number.     Medication Refills:  If you need any refills please call your pharmacy and they will contact us. Our fax number for refills is [phone].   Three business days of notice are needed for general medication refill requests.   Five business days of notice are needed for controlled substance refill requests.   If you need to change to a different pharmacy, please contact the new pharmacy directly. The new pharmacy will help you get your medications transferred.     Contact Us:  Please call [phone] during business hours (8-5:00 M-F).   If you have medication related questions after clinic hours, or on the weekend, please call [phone].     Financial Assistance [phone]   Medical Records [phone]       MENTAL HEALTH CRISIS RESOURCES:  For a emergency help, please call 911 or go to the nearest Emergency Department.     Emergency Walk-In Options:   EmPATH Unit @ [place]  Keith ([place]): [phone] - Specialized mental health emergency area designed to be calming  [facility] ([place]): [phone]  [facility] ([place]): [phone]  [facility] ([place]): [phone]    [facility] Crisis Information:   [place]: [phone]  [place]: [phone]  [place] ([place]) - Adult: [phone]     Child: [phone]  [place] - Adult: [phone]     Child: [phone]  [place]: [phone]  List of all [facility] resources:   https://[place].gov/dhs/people-we-serve/adults/health-care/mental-health/resources/crisis-contacts.jsp    National Crisis Information:   Crisis Text Line: Text  [place]  to 352984  Suicide & Crisis Lifeline: 988  National Suicide Prevention Lifeline: 4-324-339-TALK (1-732.260.6948)       For online chat options, visit https://suicidepreventionlifeline.org/chat/  Poison Control Center: 1-967.416.3922  Trans Lifeline: 1-839.739.9665 - Hotline for transgender people of all ages  The [employer] Project: 2-286-542-5964 - Hotline for LGBT youth     For Non-Emergency Support:   Fast Tracker: Mental Health & Substance Use Disorder Resources -   https://www.Element PowerckQuickProNotesn.org/

## 2023-03-16 NOTE — PROGRESS NOTES
"Prakash is a 32 year old who is being evaluated via a billable video visit.      How would you like to obtain your AVS? MyChart  If the video visit is dropped, the invitation should be resent by: Text to cell phone: [phone]  Will anyone else be joining your video visit? No        Assessment & Plan     Type 2 diabetes mellitus with hyperglycemia, without long-term current use of insulin (H)  Tolerating Lantus well.  Continue Ozempic.  Diarrhea has resolved since stopping metformin.  Prescription sent for Dexcom.  Has applied for part B to try and get glucometer covered.  Encouraged to check blood sugars 2-3 times per week upon waking.  Keep follow-up appointment with diabetic education.  - Continuous Blood Gluc  (DEXCOM G6 ) ZEINAB; Use to read blood sugars as per 's instructions.  - Continuous Blood Gluc Sensor (DEXCOM G6 SENSOR) MISC; Change every 10 days.    Prescription drug management  16 minutes spent on the date of the encounter doing chart review, history and exam, documentation and further activities per the note     BMI:   Estimated body mass index is 39.51 kg/m  as calculated from the following:    Height as of 12/27/22: 1.66 m (5' 5.35\").    Weight as of 12/27/22: 108.9 kg (240 lb).       No follow-ups on file.    BROOKLYN Cruz [facility] SHAILESH Randall is a 32 year old, presenting for the following health issues:  Diabetes      HPI     Diabetes Follow-up    How often are you checking your blood sugar? A few times a week  What time of day are you checking your blood sugars (select all that apply)?  varies  Have you had any blood sugars above 200?  Yes most of them  Have you had any blood sugars below 70?  No    What symptoms do you notice when your blood sugar is low?  None    What concerns do you have today about your diabetes? None and Other:      Do you have any of these symptoms? (Select all that apply)  Excessive thirst    Have " you had a diabetic eye exam in the last 12 months? No        BP Readings from Last 2 Encounters:   12/27/22 133/88   12/15/22 (!) 138/100     Hemoglobin A1C (%)   Date Value   12/15/2022 6.5 (H)   08/29/2022 6.4 (H)   03/19/2021 5.8 (H)   11/05/2018 5.8 (H)     LDL Cholesterol Calculated (mg/dL)   Date Value   12/15/2022 10   08/29/2022 78   04/28/2021 81   03/01/2021 51           How many servings of fruits and vegetables do you eat daily?  2-3    On average, how many sweetened beverages do you drink each day (Examples: soda, juice, sweet tea, etc.  Do NOT count diet or artificially sweetened beverages)?   1    How many days per week do you exercise enough to make your heart beat faster? 3 or less    How many minutes a day do you exercise enough to make your heart beat faster? 9 or less  How many days per week do you miss taking your medication? 2    What makes it hard for you to take your medications?  remembering to take      Video visit completed due to COVID 19 outbreak.     Seen by diabetic ed and was started on 11 units of insulin daily.  Has been taking insulin and tolerating it well.  Previous prescription sent for glucometer.  Not covered by insurance.  Did apply for Medicaid part B.  Has not heard anything back yet.  Applied about 2 weeks ago.  Has not been checking blood sugars.  Does have glucometer and strips available, just has not checked.  No longer taking metformin.  Diarrhea has resolved.  Is taking Ozempic weekly and tolerating well.  Appetite is less.  Has not weighed self.  Thirst continues, is not getting less but is not getting more either.    Review of Systems   Endocrine: Positive for polyphagia.          Objective           Vitals:  No vitals were obtained today due to virtual visit.    Physical Exam  Constitutional:       General: He is not in acute distress.     Appearance: Normal appearance. He is not toxic-appearing.   HENT:      Nose: No congestion.   Eyes:      General: Lids are  normal.   Pulmonary:      Effort: Pulmonary effort is normal. No tachypnea, bradypnea or respiratory distress.   Skin:     Coloration: Skin is not ashen, cyanotic, jaundiced or pale.   Neurological:      Mental Status: He is alert.   Psychiatric:         Mood and Affect: Mood normal.         Speech: Speech normal.         Behavior: Behavior normal. Behavior is cooperative.              Video-Visit Details    Type of service:  Video Visit   Video Start Time: 1132  Video End Time:11:42 AM    Originating Location (pt. Location): Home  Distant Location (provider location):  On-site  [facility]
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
## 2023-03-16 NOTE — PATIENT INSTRUCTIONS
I have sent a prescription for DEXA, which is a continuous glucose monitor to the pharmacy for you.  Hopefully, your insurance will cover this.    Please check your blood sugar 2-3 times per week, right away in the morning when you wake up.    Hopefully, as your blood sugar decreases your thirst will decreased as well.

## 2023-03-16 NOTE — PROGRESS NOTES
Ayana Prasad is a 32 year old who is being evaluated via a billable video visit.      Pt will join video visit via: [employer]  If there are problems joining the visit, send backup video invite via: Text to preferred phone: [phone]    Originating location (patient location): Patient's home    Will anyone else be joining the visit? No

## 2023-03-16 NOTE — NURSING NOTE
Is the patient currently in the state of [place]? YES    Visit mode:VIDEO    If the visit is dropped, the patient can be reconnected by: VIDEO VISIT: Text to cell phone:   Telephone Information:   Mobile [phone]       Will anyone else be joining the visit? No  (If patient encounters technical issues they should call [phone])    How would you like to obtain your AVS? MyChart    Are changes needed to the allergy or medication list? NO   Patient declined individual  medication review by support staff because patient states no changes to medications.    Rooming Documentation: Patient will complete qnrs in mychart    Reason for visit:  follow up    EDWAR Bond

## 2023-03-22 ENCOUNTER — TELEPHONE (OUTPATIENT)
Dept: PSYCHIATRY | Facility: CLINIC | Age: 33
End: 2023-03-22
Payer: MEDICARE

## 2023-03-22 ENCOUNTER — MYC MEDICAL ADVICE (OUTPATIENT)
Dept: FAMILY MEDICINE | Facility: CLINIC | Age: 33
End: 2023-03-22
Payer: MEDICARE

## 2023-03-22 DIAGNOSIS — E11.65 TYPE 2 DIABETES MELLITUS WITH HYPERGLYCEMIA, WITHOUT LONG-TERM CURRENT USE OF INSULIN (H): Primary | ICD-10-CM

## 2023-03-22 RX ORDER — PROCHLORPERAZINE 25 MG/1
SUPPOSITORY RECTAL
Qty: 1 EACH | Refills: 1 | Status: SHIPPED | OUTPATIENT
Start: 2023-03-22 | End: 2023-05-26

## 2023-03-22 NOTE — TELEPHONE ENCOUNTER
Received a call from patient who states they attempted to go to [facility] to have their labs drawn, but they did not have the lab orders. Patient states he needs labs re-sent to [facility] and will need to have his labs drawn another day.

## 2023-03-22 NOTE — TELEPHONE ENCOUNTER
"Received call from lab stating that the orders they received did not have a dx code.     Lab staff also provided their phone number: [phone].    Writer re-faxed lab order that includes diagnosis: Medication management    Writer called the lab and they confirm receipt of the order for lithium level and stated they included both \"Ayana\" and \"Randall\" on the order.   "

## 2023-04-04 ENCOUNTER — TELEPHONE (OUTPATIENT)
Dept: PLASTIC SURGERY | Facility: CLINIC | Age: 33
End: 2023-04-04

## 2023-04-04 NOTE — TELEPHONE ENCOUNTER
Pt had a pre-op appointment with Dr Mayers today to discuss planning for surgery on 4/23/23. Pt needed to cancel today's appointment and needed to reschedule. Discussed with Dr Mayers who said that this patient wouldn't need to reschedule if they feel prepared and don't have questions related to his surgery. He previously had this pre-op surgical consultation in December, but his scheduled surgery needed to be cancelled at this time. Called pt to discuss. He said he feels prepared for surgery and has no questions. He continues to be nicotine free and has reviewed the pre and postop instruction packet. He is comfortable without rescheduling today's appointment with Dr Mayers. Pt has pre-op H&P on 4/12 with his PCP. Discussed that he will need to follow any recommendations made by his PCP during this appointment, to which the pt verbalized an understanding. Pt had PA approval verified in January 2023 but reports that he has since added Medicare Part B. This RN is unsure if this will impact his PA approval. Asked pt to send pictures of new insurance information via [employer]. Will discuss this with prior authorization team and ask them to follow up to ensure PA approval is still in effect. Pt understanding of this as well.     Called pt back to discuss plans after surgery. He said that his roommate will be driving him home after surgery and that an RN at his group home will be going to empty his drains twice daily. He said that his nurse knows the surgery is scheduled for 4/24/23 and that they will need to empty his drains twice daily until they are removed, typically at the first postop appointment. Pt understanding of the plan moving forward.

## 2023-04-06 ENCOUNTER — TELEPHONE (OUTPATIENT)
Dept: PLASTIC SURGERY | Facility: CLINIC | Age: 33
End: 2023-04-06
Payer: MEDICARE

## 2023-04-06 NOTE — TELEPHONE ENCOUNTER
"Called pt to inform him that the prior authorization team submitted PA to his new insurance, and the response was \"no approval necessary\" and that we can move forward with surgery on 4/24/23 with Dr Mayers as scheduled.     Pt reiterated that his roommate will be driving him home after surgery and that an RN at his group home will be going to empty his drains twice daily. He said that his nurse knows the surgery is scheduled for 4/24/23 and that they will need to empty his drains twice daily until they are removed, typically at the first postop appointment. Pt's group home provides meals, so preparing food will not be an issue after surgery. Pt confirmed that his roommate will also be driving him to and from postop appointments. He has no questions and verbalizes an understanding of all pre-op needs. He has pre-op H&P on 4/12/23. Will review the documentation from this appointment and will follow up with the patient if any lab work is needed for follow up.      "

## 2023-04-07 ENCOUNTER — TELEPHONE (OUTPATIENT)
Dept: PSYCHIATRY | Facility: CLINIC | Age: 33
End: 2023-04-07
Payer: MEDICARE

## 2023-04-07 NOTE — TELEPHONE ENCOUNTER
"Called to remind patient to try to get lithium labs done prior to appointment next week. Patient states that they stopped taking the lithium last week and plan to talk to provider about a different medication at appointment. Patient states that mood is \"OK, maybe a little on the depressed side, but I am doing good.\" Asked patient to call back to clinic if depression becomes worse before appointment and he agrees. Message sent to provider for update.   "

## 2023-04-12 ENCOUNTER — MYC REFILL (OUTPATIENT)
Dept: PSYCHIATRY | Facility: CLINIC | Age: 33
End: 2023-04-12

## 2023-04-12 DIAGNOSIS — M51.369 DDD (DEGENERATIVE DISC DISEASE), LUMBAR: ICD-10-CM

## 2023-04-12 DIAGNOSIS — E11.65 TYPE 2 DIABETES MELLITUS WITH HYPERGLYCEMIA, WITHOUT LONG-TERM CURRENT USE OF INSULIN (H): Primary | ICD-10-CM

## 2023-04-12 RX ORDER — GABAPENTIN 300 MG/1
900 CAPSULE ORAL 3 TIMES DAILY
Qty: 270 CAPSULE | Refills: 0 | Status: CANCELLED | OUTPATIENT
Start: 2023-04-12

## 2023-04-12 NOTE — TELEPHONE ENCOUNTER
Last Seen 3/16  RTC 4 weeks  Cancel None  No-Show None    Next Appt 4/13    Incoming Refill From patient via mychart    Medication Requested   gabapentin (NEURONTIN) 300 MG capsule    Directions   Take 3 capsules (900 mg) by mouth 3 times daily    Qty 270    Last Refill Per [place]  1/10 #270, 12/18 #270, 11/23 #270    Per pharmacy no refills on file. They are unsure on where the script written on 2/23 is.       Medication pended and routed to provider for approval.

## 2023-04-13 ENCOUNTER — MYC REFILL (OUTPATIENT)
Dept: FAMILY MEDICINE | Facility: CLINIC | Age: 33
End: 2023-04-13
Payer: MEDICARE

## 2023-04-13 ENCOUNTER — TELEPHONE (OUTPATIENT)
Dept: FAMILY MEDICINE | Facility: CLINIC | Age: 33
End: 2023-04-13
Payer: MEDICARE

## 2023-04-13 ENCOUNTER — VIRTUAL VISIT (OUTPATIENT)
Dept: PSYCHIATRY | Facility: CLINIC | Age: 33
End: 2023-04-13
Attending: NURSE PRACTITIONER
Payer: MEDICARE

## 2023-04-13 ENCOUNTER — TELEPHONE (OUTPATIENT)
Dept: PSYCHIATRY | Facility: CLINIC | Age: 33
End: 2023-04-13
Payer: MEDICARE

## 2023-04-13 ENCOUNTER — MYC MEDICAL ADVICE (OUTPATIENT)
Dept: PSYCHIATRY | Facility: CLINIC | Age: 33
End: 2023-04-13
Payer: MEDICARE

## 2023-04-13 DIAGNOSIS — R51.9 NONINTRACTABLE HEADACHE, UNSPECIFIED CHRONICITY PATTERN, UNSPECIFIED HEADACHE TYPE: ICD-10-CM

## 2023-04-13 DIAGNOSIS — F90.2 ATTENTION DEFICIT HYPERACTIVITY DISORDER (ADHD), COMBINED TYPE: ICD-10-CM

## 2023-04-13 DIAGNOSIS — F99 INSOMNIA DUE TO OTHER MENTAL DISORDER: ICD-10-CM

## 2023-04-13 DIAGNOSIS — M51.369 DDD (DEGENERATIVE DISC DISEASE), LUMBAR: ICD-10-CM

## 2023-04-13 DIAGNOSIS — F51.05 INSOMNIA DUE TO OTHER MENTAL DISORDER: ICD-10-CM

## 2023-04-13 DIAGNOSIS — F25.0 SCHIZOAFFECTIVE DISORDER, BIPOLAR TYPE (H): Primary | ICD-10-CM

## 2023-04-13 PROCEDURE — 99214 OFFICE O/P EST MOD 30 MIN: CPT | Mod: VID | Performed by: NURSE PRACTITIONER

## 2023-04-13 RX ORDER — GABAPENTIN 300 MG/1
900 CAPSULE ORAL 3 TIMES DAILY
Qty: 270 CAPSULE | Refills: 0 | Status: SHIPPED | OUTPATIENT
Start: 2023-04-13 | End: 2023-04-14

## 2023-04-13 RX ORDER — DEXTROAMPHETAMINE SACCHARATE, AMPHETAMINE ASPARTATE MONOHYDRATE, DEXTROAMPHETAMINE SULFATE AND AMPHETAMINE SULFATE 5; 5; 5; 5 MG/1; MG/1; MG/1; MG/1
20 CAPSULE, EXTENDED RELEASE ORAL DAILY
Qty: 30 CAPSULE | Refills: 0 | Status: SHIPPED | OUTPATIENT
Start: 2023-04-13 | End: 2023-04-14

## 2023-04-13 RX ORDER — OLANZAPINE 5 MG/1
5 TABLET ORAL AT BEDTIME
Qty: 30 TABLET | Refills: 1 | Status: SHIPPED | OUTPATIENT
Start: 2023-04-13 | End: 2023-05-17

## 2023-04-13 RX ORDER — OLANZAPINE 20 MG/1
20 TABLET ORAL AT BEDTIME
Qty: 30 TABLET | Refills: 1 | Status: SHIPPED | OUTPATIENT
Start: 2023-04-13 | End: 2023-05-17

## 2023-04-13 RX ORDER — ESCITALOPRAM OXALATE 5 MG/1
5 TABLET ORAL DAILY
Qty: 30 TABLET | Refills: 1 | Status: SHIPPED | OUTPATIENT
Start: 2023-04-13 | End: 2023-05-17
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
## 2023-04-13 NOTE — TELEPHONE ENCOUNTER
Forms received from: [facility]   Phone number listed: [phone]   Fax listed: [phone]  Date received: 4/11/23  Form description:  Home health certification & plan of care  Once forms are completed, please return to [facility] via fax [phone].  Is patient requesting to be contacted when forms are completed: na  Phone: na  Form placed:  Becki Sorto

## 2023-04-13 NOTE — PATIENT INSTRUCTIONS
-Increase Zyprexa (Olanzapine) to 25 mg at bedtime for mood and psychosis.  Monitor for sedation and appetite.  -Start Lexapro 5 mg daily for mood. Monitor for any sign of hypomania.  -Lithium and Propranolol are discontinued.  -Continue all other medication regimen. Gabapentin is refilled with 900 mg 3 times a day only. Discuss this with pain management next week and have them manage Gabapentin for the future.    Your next appointment is scheduled on 5/10/2023 (Wed) at 1pm.    Thank you for coming to the [facility] [place] [facility].    Lab Testing:  If you had lab testing today and your results are reassuring or normal they will be mailed to you or sent through [facility] within 7 days. If the lab tests need quick action we will call you with the results. The phone number we will call with results is # [phone] (home) . If this is not the best number please call our clinic and change the number.    Medication Refills:  If you need any refills please call your pharmacy and they will contact us. Our fax number for refills is [phone]. Please allow three business for refill processing. If you need to  your refill at a new pharmacy, please contact the new pharmacy directly. The new pharmacy will help you get your medications transferred.     Scheduling:  If you have any concerns about today's visit or wish to schedule another appointment please call our office during normal business hours [phone] (8-5:00 M-F)    Contact Us:  Please call [phone] during business hours (8-5:00 M-F).  If after clinic hours, or on the weekend, please call  [phone].    Financial Assistance [phone]  [facility] [phone]  Central Billing Office, [facility]: [phone]  [facility] [phone]  Medical Records [phone]      MENTAL HEALTH CRISIS NUMBERS:  For a medical emergency please call  558 or go to the nearest ER.     [facility]:   [facility]  [facility] -[phone]   Crisis Residence [facility] -[phone]   Walk-In Counseling Center [facility] -[phone]   COPE 24/7 [facility] Mobile Team -[phone] (adults)/[phone] (child)  CHILD: [facility] needs assessment team - [phone]      [facility]:   [facility] - [phone]   Walk-in counseling [facility] - [phone]   Walk-in counseling [facility] - [phone]   Crisis Residence [facility] - [phone]  Urgent Care Adult Mental Sxnqxq-[phone] mobile unit/ 24/7 crisis line    National Crisis Numbers:   National Suicide Prevention Lifeline: 2-295-348-TALK (981-606-7363)  Poison Control Center - 1-905.503.3725  [facility]/resources for a list of additional resources (SOS)  Trans Lifeline a hotline for transgender people 1-383.857.6381  The [employer] a hotline for LGBT youth 1-813.886.6572  Crisis Text Line: For any crisis 24/7   To: 108598  see www.crisistextline.org  - IF MAKING A CALL FEELS TOO HARD, send a text!         Again thank you for choosing [facility] and please let us know how we can best partner with you to improve you and your family's health.    You may be receiving a survey regarding this appointment. We would love to have your feedback, both positive and negative. The survey is done by an external company, so your answers are anonymous.

## 2023-04-13 NOTE — NURSING NOTE
Is the patient currently in the state of [place]? YES    Visit mode:VIDEO    If the visit is dropped, the patient can be reconnected by: VIDEO VISIT: Text to cell phone: [phone]    Will anyone else be joining the visit? NO      How would you like to obtain your AVS? MyChart    Are changes needed to the allergy or medication list? NO    Reason for visit: Follow Up

## 2023-04-13 NOTE — TELEPHONE ENCOUNTER
Regine Last, BROOKLYN CNP  P Psychiatry Nurse-[facility] [[email or web address]]  I received a refill for Gabapentin and approved it today earlier, but can we call and cancel?  This has been managed by pain management.  Pls send refill request to pain management. Thank you!     Writer called [facility] to cancel prescription of Gabapentin. Per pharmacy staff medication had already been filled and picked up by patient. Writer notified provider.

## 2023-04-14 ENCOUNTER — TELEPHONE (OUTPATIENT)
Dept: PLASTIC SURGERY | Facility: CLINIC | Age: 33
End: 2023-04-14
Payer: MEDICARE

## 2023-04-14 RX ORDER — GABAPENTIN 300 MG/1
900 CAPSULE ORAL 3 TIMES DAILY
Qty: 270 CAPSULE | Refills: 0 | COMMUNITY
Start: 2023-04-14 | End: 2023-04-19 | Stop reason: DRUGHIGH

## 2023-04-14 RX ORDER — DEXTROAMPHETAMINE SACCHARATE, AMPHETAMINE ASPARTATE MONOHYDRATE, DEXTROAMPHETAMINE SULFATE AND AMPHETAMINE SULFATE 5; 5; 5; 5 MG/1; MG/1; MG/1; MG/1
20 CAPSULE, EXTENDED RELEASE ORAL DAILY
Qty: 30 CAPSULE | Refills: 0 | Status: SHIPPED | OUTPATIENT
Start: 2023-04-14 | End: 2023-05-17
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
## 2023-04-17 DIAGNOSIS — Z53.9 DIAGNOSIS NOT YET DEFINED: Primary | ICD-10-CM

## 2023-04-17 PROCEDURE — G0180 MD CERTIFICATION HHA PATIENT: HCPCS | Performed by: NURSE PRACTITIONER

## 2023-04-19 ENCOUNTER — OFFICE VISIT (OUTPATIENT)
Dept: PALLIATIVE MEDICINE | Facility: CLINIC | Age: 33
End: 2023-04-19
Payer: MEDICARE

## 2023-04-19 VITALS — SYSTOLIC BLOOD PRESSURE: 122 MMHG | DIASTOLIC BLOOD PRESSURE: 78 MMHG | HEART RATE: 96 BPM

## 2023-04-19 DIAGNOSIS — G89.29 CHRONIC LEFT-SIDED LOW BACK PAIN WITH LEFT-SIDED SCIATICA: ICD-10-CM

## 2023-04-19 DIAGNOSIS — M54.42 CHRONIC LEFT-SIDED LOW BACK PAIN WITH LEFT-SIDED SCIATICA: ICD-10-CM

## 2023-04-19 DIAGNOSIS — M51.369 DDD (DEGENERATIVE DISC DISEASE), LUMBAR: Primary | ICD-10-CM

## 2023-04-19 PROCEDURE — 99213 OFFICE O/P EST LOW 20 MIN: CPT | Performed by: NURSE PRACTITIONER

## 2023-04-19 RX ORDER — GABAPENTIN 600 MG/1
1200 TABLET ORAL 3 TIMES DAILY
Qty: 540 TABLET | Refills: 1 | Status: ON HOLD | OUTPATIENT
Start: 2023-04-19 | End: 2023-06-08

## 2023-04-19 ASSESSMENT — PAIN SCALES - GENERAL: PAINLEVEL: MILD PAIN (3)
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
## 2023-04-19 NOTE — PATIENT INSTRUCTIONS
Plan:  Physical Therapy: ordered  Clinical Health Psychologist to address issues of relaxation, behavioral change, coping style, and other factors important to improvement: none  Diagnostic Studies: none  Medication Management:   Certified for medical cannabis today  Start metaxalone 400-800mg three times daily as needed for muscle spasms. Caution sedation.   I don't recommend opiate medication for your chronic pain  Further procedures recommended:   Let me know if you ever want to do a lumbar epidural steroid injection. I can either give you a little valium in a tablet or I can talk to our interventionalist MD and see if a little IV sedation would be okay with them.   Urine toxicology screen today: none  Recommendations/follow-up for PCP:  See above  Release of information: none  Follow up: 3 months     ----------------------------------------------------------------  Clinic Number:  [phone]   Call with any questions about your care and for scheduling assistance.   Calls are returned Monday through Friday between 8 AM and 4:30 PM. We usually get back to you within 2 business days depending on the issue/request.    If we are prescribing your medications:  For opioid medication refills, call the clinic or send a [email or web address] message 7 days in advance.  Please include:  Name of requested medication  Name of the pharmacy.  For non-opioid medications, call your pharmacy directly to request a refill. Please allow 3-4 days to be processed.   Per [place] State Law:  All controlled substance prescriptions must be filled within 30 days of being written.    For those controlled substances allowing refills, pickup must occur within 30 days of last fill.      We believe regular attendance is key to your success in our program!    Any time you are unable to keep your appointment we ask that you call us at least 24 hours in advance to cancel.This will allow us to offer the appointment time to another patient.   Multiple missed  appointments may lead to dismissal from the clinic.

## 2023-04-21 ENCOUNTER — NURSE TRIAGE (OUTPATIENT)
Dept: FAMILY MEDICINE | Facility: CLINIC | Age: 33
End: 2023-04-21
Payer: MEDICARE

## 2023-04-21 NOTE — TELEPHONE ENCOUNTER
"Nurse Triage SBAR    Is this a 2nd Level Triage? YES, LICENSED PRACTITIONER REVIEW IS REQUIRED    Situation: Blood sugars elevated at 355 at of about 4:30 pm. Tired and thirsty. No other symptoms. Patient states forgot to take insulin for past 3-4 days.     Background: takes Lantus 11 units at bedtime. States that he has been forgetting simply from being so tired.       Assessment: advised patient to set two alarms on his phone around bedtime as reminders. Needs to be an advocate for himself and his health. Drink plenty of water. Take insulin now.       Protocol Recommended Disposition:   Home Care    Recommendation: spoke with PCP here in the clinic who advised drinking plenty of water to assist with flushing glucose out of the blood. Take next dose of lantus tomorrow night at bedtime. Take regularly.     Instruction/Advice: Call 911 or go to ER if start having more symptoms such as vomiting, fever, confusion, and unconsciousness. Patient does have a roommate who will check on him.      Set alarms on phone as reminders to take medication.     Routed to provider    Does the patient meet one of the following criteria for ADS visit consideration? No      1. BLOOD GLUCOSE: \"What is your blood glucose level?\"       355  2. ONSET: \"When did you check the blood glucose?\"      4:25 pm today  3. USUAL RANGE: \"What is your glucose level usually?\" (e.g., usual fasting morning value, usual evening value)      150  4. KETONES: \"Do you check for ketones (urine or blood test strips)?\" If yes, ask: \"What does the test show now?\"       No.   5. TYPE 1 or 2:  \"Do you know what type of diabetes you have?\"  (e.g., Type 1, Type 2, Gestational; doesn't know)       2  6. INSULIN: \"Do you take insulin?\" \"What type of insulin(s) do you use? What is the mode of delivery? (syringe, pen; injection or pump)?\"       Lantus at bedtime. Instructed patient to take it now which he did.   7. DIABETES PILLS: \"Do you take any pills for your diabetes?\" " "If yes, ask: \"Have you missed taking any pills recently?\"      None.   8. OTHER SYMPTOMS: \"Do you have any symptoms?\" (e.g., fever, frequent urination, difficulty breathing, dizziness, weakness, vomiting)      Increased frequency in urinating (noticed about 2 hours a go). Tiredness. Increased thirst.   9. PREGNANCY: \"Is there any chance you are pregnant?\" \"When was your last menstrual period?\"      NA    Reason for Disposition    Blood glucose > 300 mg/dL (16.7 mmol/L)    Additional Information    Negative: Unconscious or difficult to awaken    Negative: Acting confused (e.g., disoriented, slurred speech)    Negative: Very weak (can't stand)    Negative: Sounds like a life-threatening emergency to the triager    Negative: Vomiting and signs of dehydration (e.g., very dry mouth, lightheaded, dark urine)    Negative: Blood glucose > 240 mg/dL (13.3 mmol/L) and rapid breathing    Negative: Blood glucose > 500 mg/dL (27.8 mmol/L)    Negative: Blood glucose > 240 mg/dL (13.3 mmol/L) AND urine ketones moderate-large (or more than 1+)    Negative: Blood glucose > 240 mg/dL (13.3 mmol/L) and blood ketones > 1.4 mmol/L    Negative: Blood glucose > 240 mg/dL (13.3 mmol/L) AND vomiting AND unable to check for ketones (in blood or urine)    Negative: Vomiting lasting > 4 hours    Negative: Patient sounds very sick or weak to the triager    Negative: Fever > 100.4 F (38.0 C)    Negative: Caller has URGENT medication or insulin pump question and triager unable to answer question    Protocols used: DIABETES - HIGH BLOOD SUGAR-A-OH    ELI MejiaN SRINIVAS  [facility]    "

## 2023-04-23 ENCOUNTER — HEALTH MAINTENANCE LETTER (OUTPATIENT)
Age: 33
End: 2023-04-23

## 2023-04-24 ENCOUNTER — TELEPHONE (OUTPATIENT)
Dept: FAMILY MEDICINE | Facility: CLINIC | Age: 33
End: 2023-04-24
Payer: MEDICARE

## 2023-04-24 NOTE — TELEPHONE ENCOUNTER
Pt calling to request an appointment with pcp(Becki) for acid reflux and asthma this week.     -Acid reflux: Pt woke up overnight feeling like they were choking like fluids down the wrong way.  -Asthma: Pt was experiencing difficulty breathing when exercising.    Writer informed pt that pcp will need to be notified of possible openings for the next week. Pt was advised that if their symptoms worsen, they need to be seen sooner in the Emergency Room to be evaluated. Pt verbalized understanding and stated they would rather wait for pcp's response.    Can pt be placed on schedule this week (4/24/23) for acid reflux and asthma consult?    Routing to pcp to advise.

## 2023-04-24 NOTE — TELEPHONE ENCOUNTER
Had appointment scheduled last week-no showed.  Next available opening is a third which is virtual.  If would like to be seen in clinic will need to be scheduled at next available opening.    Becki JENKINS

## 2023-05-03 ENCOUNTER — VIRTUAL VISIT (OUTPATIENT)
Dept: FAMILY MEDICINE | Facility: CLINIC | Age: 33
End: 2023-05-03
Payer: MEDICARE

## 2023-05-03 DIAGNOSIS — Z12.4 CERVICAL CANCER SCREENING: Primary | ICD-10-CM

## 2023-05-03 DIAGNOSIS — R51.9 NONINTRACTABLE HEADACHE, UNSPECIFIED CHRONICITY PATTERN, UNSPECIFIED HEADACHE TYPE: ICD-10-CM

## 2023-05-03 DIAGNOSIS — E66.01 MORBID OBESITY (H): ICD-10-CM

## 2023-05-03 DIAGNOSIS — E11.65 TYPE 2 DIABETES MELLITUS WITH HYPERGLYCEMIA, WITHOUT LONG-TERM CURRENT USE OF INSULIN (H): ICD-10-CM

## 2023-05-03 DIAGNOSIS — K21.9 GASTROESOPHAGEAL REFLUX DISEASE WITHOUT ESOPHAGITIS: ICD-10-CM

## 2023-05-03 PROCEDURE — 99214 OFFICE O/P EST MOD 30 MIN: CPT | Mod: VID | Performed by: NURSE PRACTITIONER

## 2023-05-03 RX ORDER — FAMOTIDINE 40 MG/1
TABLET, FILM COATED ORAL
Qty: 180 TABLET | Refills: 0 | Status: SHIPPED | OUTPATIENT
Start: 2023-05-03 | End: 2023-05-22

## 2023-05-03 RX ORDER — SEMAGLUTIDE 1.34 MG/ML
0.5 INJECTION, SOLUTION SUBCUTANEOUS
Qty: 9 ML | Refills: 0 | Status: SHIPPED | OUTPATIENT
Start: 2023-05-03 | End: 2023-05-03

## 2023-05-03 ASSESSMENT — ASTHMA QUESTIONNAIRES: ACT_TOTALSCORE: 16

## 2023-05-03 ASSESSMENT — ENCOUNTER SYMPTOMS
CHOKING: 1
COUGH: 0
WHEEZING: 1
ROS GI COMMENTS: HEARTBURN

## 2023-05-03 NOTE — PROGRESS NOTES
Prakash is a 32 year old who is being evaluated via a billable video visit.      How would you like to obtain your AVS? MyChart  If the video visit is dropped, the invitation should be resent by: Text to cell phone: [phone]  Will anyone else be joining your video visit? No          Assessment & Plan       Type 2 diabetes mellitus with hyperglycemia, without long-term current use of insulin (H)  Encouraged to set reminder or alarm.  We will plan to increase Ozempic from 0.5mg to 1 mg weekly.  Ideally would like to get Prakash off of insulin and be able to control diabetes with weekly injectable and oral medications.  Does not tolerate metformin-causes extreme diarrhea.  Encouraged to make lab only appointment.  - Semaglutide, 1 MG/DOSE, (OZEMPIC) 4 MG/3ML pen; Inject 1 mg Subcutaneous every 7 days    Morbid obesity (H)  Working well-has lost 5 pounds.  Encouraged to remain active.  Reinforced importance of higher protein diet.  - semaglutide (OZEMPIC, 0.25 OR 0.5 MG/DOSE,) 2 MG/1.5ML SOPN pen; Inject 0.5 mg Subcutaneous every 7 days for 30 days    Gastroesophageal reflux disease without esophagitis  We will plan to increase famotidine to 40 mg twice a day for 12 weeks and then will decrease to daily.  Has been on omeprazole in the past.  Reports omeprazole worked much better.  Does have history of C. difficile.  Ideally would like to be able to control GERD with use of famotidine instead of PPI.  Encouraged to send [employer] message if is not controlled over the next 2 to 3 weeks.  - famotidine (PEPCID) 40 MG tablet; Take twice per day for 12 weeks then decrease to daily    Review of the result(s) of each unique test - Labs  Ordering of each unique test  Prescription drug management  37 minutes spent by me on the date of the encounter doing chart review, history and exam, documentation and further activities per the note     BMI:   Estimated body mass index is 39.51 kg/m  as calculated from the following:    Height as of  "12/27/22: 1.66 m (5' 5.35\").    Weight as of 12/27/22: 108.9 kg (240 lb).   Weight management plan: Discussed healthy diet and exercise guidelines      BROOKLYN Cruz CNP  [facility] SHAILESH Randall is a 32 year old, presenting for the following health issues:  Asthma (/) and Heartburn        5/3/2023    11:09 AM   Additional Questions   Roomed by SHAHZAD   Accompanied by TYREL         5/3/2023    11:09 AM   Patient Reported Additional Medications   Patient reports taking the following new medications None per patient     HPI     Asthma Follow-Up    Was ACT completed today?  Yes        5/3/2023    11:06 AM   ACT Total Scores   ACT TOTAL SCORE (Goal Greater than or Equal to 20) 16   In the past 12 months, how many times did you visit the emergency room for your asthma without being admitted to the hospital? 0   In the past 12 months, how many times were you hospitalized overnight because of your asthma? 0         How many days per week do you miss taking your asthma controller medication?  I do not have an asthma controller medication    Please describe any recent triggers for your asthma: None    Have you had any Emergency Room Visits, Urgent Care Visits, or Hospital Admissions since your last office visit?  No        Medication Followup of pepcid    Taking Medication as prescribed: yes    Side Effects:  None    Medication Helping Symptoms:  yes        Woke up and throat was burning and was gasping for air. Did not have liquid in mouth. Unsure what had to eat. Felt like it was really hard to breath.  Was short of breath.  This has been happening more frequently.  Has been taking Pepcid.  Feels like is not able to exercise like would like to because will have wheezing.  Whenever coughs and burps will bring up fluids.    Has been checking blood sugars.  Having a hard time remembering to take Lantus insulin daily.  When travels forgets to take insulin with.  Was recently in [place] for 5 " days and forgot to take insulin with.  Will check blood sugar when does not take insulin and will be over 400.  Does not feel like blood sugar is high during those times.  Is due for labs.  Is restricted to [facility] for lab work.    Has not been able to get top surgery.  Was approved by insurance finally due to mental health was not able to get it.         Review of Systems   Respiratory: Positive for choking and wheezing. Negative for cough.    Gastrointestinal:        Heartburn          Objective           Vitals:  No vitals were obtained today due to virtual visit.    Physical Exam  Constitutional:       General: He is not in acute distress.     Appearance: Normal appearance. He is not toxic-appearing.   HENT:      Nose: No congestion.   Eyes:      General: Lids are normal.   Pulmonary:      Effort: Pulmonary effort is normal. No tachypnea, bradypnea or respiratory distress.   Skin:     Coloration: Skin is not ashen, cyanotic, jaundiced or pale.   Neurological:      Mental Status: He is alert.   Psychiatric:         Mood and Affect: Mood normal.         Speech: Speech normal.         Behavior: Behavior normal. Behavior is cooperative.             Video-Visit Details    Type of service:  Video Visit   Video Start Time: 1133  Video End Time: 1209    Originating Location (pt. Location): Home    Distant Location (provider location):  On-site  Platform used for Video Visit: Elías

## 2023-05-04 ENCOUNTER — TELEPHONE (OUTPATIENT)
Dept: PSYCHIATRY | Facility: CLINIC | Age: 33
End: 2023-05-04
Payer: MEDICARE

## 2023-05-04 NOTE — TELEPHONE ENCOUNTER
Patient called and feels like he is having increased haven symptoms. He also met with therapist who agrees. He had been sleeping 12 hours a day and now is down to 1-2 hours per night and one night has no sleep. This has been going on for the last few nights. He is also experiencing racing thoughts, pressured speech and an increase in voices. He recently started Lunesta and said that he was told to call if he started to have increased haven symptoms. He took last dose this morning.   Denies thoughts of self harm, thoughts of harming others, and suicidal ideation. Patient lives in group home and will have staff bring him to ED if this changes. Update sent to provider for plan.

## 2023-05-05 ENCOUNTER — LAB (OUTPATIENT)
Dept: LAB | Facility: CLINIC | Age: 33
End: 2023-05-05
Payer: MEDICARE

## 2023-05-05 ENCOUNTER — TELEPHONE (OUTPATIENT)
Dept: PSYCHIATRY | Facility: CLINIC | Age: 33
End: 2023-05-05

## 2023-05-05 DIAGNOSIS — E11.65 TYPE 2 DIABETES MELLITUS WITH HYPERGLYCEMIA, WITHOUT LONG-TERM CURRENT USE OF INSULIN (H): ICD-10-CM

## 2023-05-05 DIAGNOSIS — F64.9 GENDER DYSPHORIA: ICD-10-CM

## 2023-05-05 DIAGNOSIS — Z78.9 FEMALE-TO-MALE TRANSGENDER PERSON: ICD-10-CM

## 2023-05-05 DIAGNOSIS — Z51.81 ENCOUNTER FOR THERAPEUTIC DRUG MONITORING: ICD-10-CM

## 2023-05-05 LAB
ALBUMIN SERPL-MCNC: 4.2 G/DL (ref 3.4–5)
ALP SERPL-CCNC: 85 U/L (ref 40–150)
ALT SERPL W P-5'-P-CCNC: 85 U/L (ref 0–70)
ANION GAP SERPL CALCULATED.3IONS-SCNC: 9 MMOL/L (ref 3–14)
AST SERPL W P-5'-P-CCNC: 80 U/L (ref 0–45)
BASOPHILS # BLD AUTO: 0 10E3/UL (ref 0–0.2)
BASOPHILS NFR BLD AUTO: 1 %
BILIRUB SERPL-MCNC: 0.3 MG/DL (ref 0.2–1.3)
BUN SERPL-MCNC: 13 MG/DL (ref 7–30)
CALCIUM SERPL-MCNC: 9.3 MG/DL (ref 8.5–10.1)
CHLORIDE BLD-SCNC: 108 MMOL/L (ref 94–109)
CHOLEST SERPL-MCNC: 152 MG/DL
CO2 SERPL-SCNC: 22 MMOL/L (ref 20–32)
CREAT SERPL-MCNC: 0.59 MG/DL (ref 0.52–1.25)
EOSINOPHIL # BLD AUTO: 0.1 10E3/UL (ref 0–0.7)
EOSINOPHIL NFR BLD AUTO: 1 %
ERYTHROCYTE [DISTWIDTH] IN BLOOD BY AUTOMATED COUNT: 14.7 % (ref 10–15)
FASTING STATUS PATIENT QL REPORTED: YES
FASTING STATUS PATIENT QL REPORTED: YES
GFR SERPL CREATININE-BSD FRML MDRD: >90 ML/MIN/1.73M2
GLUCOSE BLD-MCNC: 221 MG/DL (ref 70–99)
GLUCOSE BLD-MCNC: 221 MG/DL (ref 70–99)
HBA1C MFR BLD: 9.1 % (ref 0–5.6)
HCT VFR BLD AUTO: 45.2 % (ref 35–53)
HDLC SERPL-MCNC: 42 MG/DL
HGB BLD-MCNC: 14.9 G/DL (ref 11.7–17.7)
IMM GRANULOCYTES # BLD: 0 10E3/UL
IMM GRANULOCYTES NFR BLD: 0 %
LDLC SERPL CALC-MCNC: 46 MG/DL
LYMPHOCYTES # BLD AUTO: 1.8 10E3/UL (ref 0.8–5.3)
LYMPHOCYTES NFR BLD AUTO: 20 %
MCH RBC QN AUTO: 27.6 PG (ref 26.5–33)
MCHC RBC AUTO-ENTMCNC: 33 G/DL (ref 31.5–36.5)
MCV RBC AUTO: 84 FL (ref 78–100)
MONOCYTES # BLD AUTO: 0.5 10E3/UL (ref 0–1.3)
MONOCYTES NFR BLD AUTO: 6 %
NEUTROPHILS # BLD AUTO: 6.2 10E3/UL (ref 1.6–8.3)
NEUTROPHILS NFR BLD AUTO: 72 %
NONHDLC SERPL-MCNC: 110 MG/DL
NRBC # BLD AUTO: 0 10E3/UL
NRBC BLD AUTO-RTO: 0 /100
PLATELET # BLD AUTO: 376 10E3/UL (ref 150–450)
POTASSIUM BLD-SCNC: 3.9 MMOL/L (ref 3.4–5.3)
PROT SERPL-MCNC: 8.7 G/DL (ref 6.8–8.8)
RBC # BLD AUTO: 5.4 10E6/UL (ref 3.8–5.9)
SODIUM SERPL-SCNC: 139 MMOL/L (ref 133–144)
TRIGL SERPL-MCNC: 322 MG/DL
WBC # BLD AUTO: 8.6 10E3/UL (ref 4–11)

## 2023-05-05 PROCEDURE — 85025 COMPLETE CBC W/AUTO DIFF WBC: CPT

## 2023-05-05 PROCEDURE — 80053 COMPREHEN METABOLIC PANEL: CPT

## 2023-05-05 PROCEDURE — 84403 ASSAY OF TOTAL TESTOSTERONE: CPT

## 2023-05-05 PROCEDURE — 36415 COLL VENOUS BLD VENIPUNCTURE: CPT

## 2023-05-05 PROCEDURE — 83036 HEMOGLOBIN GLYCOSYLATED A1C: CPT

## 2023-05-05 PROCEDURE — 80061 LIPID PANEL: CPT

## 2023-05-05 NOTE — TELEPHONE ENCOUNTER
No responses to [employer] invites.  Called and LVM at 1005. Pt was no show at 1015. Regine Last, CNP, 5/5/2023

## 2023-05-08 ENCOUNTER — TELEPHONE (OUTPATIENT)
Dept: FAMILY MEDICINE | Facility: CLINIC | Age: 33
End: 2023-05-08
Payer: MEDICARE

## 2023-05-08 DIAGNOSIS — R79.89 ELEVATED LFTS: Primary | ICD-10-CM

## 2023-05-08 DIAGNOSIS — E78.1 HYPERTRIGLYCERIDEMIA: ICD-10-CM

## 2023-05-08 RX ORDER — METAPROTERENOL SULFATE 10 MG
2000 TABLET ORAL DAILY
Qty: 360 CAPSULE | Refills: 3 | Status: SHIPPED | OUTPATIENT
Start: 2023-05-08 | End: 2023-05-22

## 2023-05-08 NOTE — TELEPHONE ENCOUNTER
Pharmacy calling to state that they don't have Omega-3 Fish Oil 500 MG capsule.     The closest thing they have is Fish oil - 1200mg total and 360mg of omega 3 per capsule.      Writer unable to find this medication that pharmacy suggested. What would you like to have as an alternative?    Routing to pcp (Becki) to advise.    Jie Walters, SRINIVAS

## 2023-05-08 NOTE — TELEPHONE ENCOUNTER
Called pharmacy to let them know that pcp has offered pt this medication over the counter.     Attempted to call patient with number on file to relay provider's message below with no answer, left voicemail to call clinic back at [phone].    When pt calls back, please lets them know that Omega-3 Fish Oil 500 MG capsule can be purchased over the counter because their pharmacy does not carry this type.    Jie Walters RN

## 2023-05-09 ENCOUNTER — TELEPHONE (OUTPATIENT)
Dept: PSYCHIATRY | Facility: CLINIC | Age: 33
End: 2023-05-09
Payer: MEDICARE

## 2023-05-09 ENCOUNTER — MYC MEDICAL ADVICE (OUTPATIENT)
Dept: PSYCHIATRY | Facility: CLINIC | Age: 33
End: 2023-05-09
Payer: MEDICARE

## 2023-05-09 LAB — TESTOST SERPL-MCNC: 51 NG/DL (ref 8–950)

## 2023-05-09 NOTE — TELEPHONE ENCOUNTER
Attempted to call patient to check in and offer appointment for tomorrow.  No answer. Left voicemail to call back clinic at [phone].     MyChart sent also.

## 2023-05-09 NOTE — TELEPHONE ENCOUNTER
Called [phone] (home). Did they answer the phone: No, left a message on voicemail to return call to the [facility] at [phone], and to ask for any available triage nurse.    Tran RN  Triage Nurse  [facility]: [facility]

## 2023-05-10 ENCOUNTER — TELEPHONE (OUTPATIENT)
Dept: PSYCHIATRY | Facility: CLINIC | Age: 33
End: 2023-05-10

## 2023-05-10 NOTE — TELEPHONE ENCOUNTER
No responses to [employer] invites. Called and LVM at 1305. Pt was no show at 1315. Regine Last, CNP, 5/10/2023

## 2023-05-10 NOTE — TELEPHONE ENCOUNTER
Called [phone] (home). Did they answer the phone: No, left a message on voicemail to return call to the [facility] at [phone], and to ask for any available triage nurse.    Tran RN  Triage Nurse  [facility]: [facility]

## 2023-05-10 NOTE — TELEPHONE ENCOUNTER
Patient called back to reschedule. Patient was offered appointment tomorrow at 8:30 but he feels like that is too early in the morning and he will miss it. He was given a noon appointment on 5/17.   Patient reports that he feels like his haven symptoms are the same as when we spoke last. Denies thoughts of self harm, thoughts of harming others, and suicidal ideation. We reviewed his safety plan is anything changes over the weekend and he will go to the ED if needed. He has no immediate concerns but will reach out if needed before the appointment.

## 2023-05-11 NOTE — TELEPHONE ENCOUNTER
Patient has not called back.    Sent [employer] message regarding the message below.    Tran RN BSN  Triage Nurse  [facility]: [facility]  Ph: [phone]

## 2023-05-16 ENCOUNTER — TELEPHONE (OUTPATIENT)
Dept: PSYCHIATRY | Facility: CLINIC | Age: 33
End: 2023-05-16
Payer: MEDICARE

## 2023-05-16 DIAGNOSIS — F41.9 ANXIETY: Primary | ICD-10-CM

## 2023-05-16 RX ORDER — CLONIDINE HYDROCHLORIDE 0.1 MG/1
0.1 TABLET ORAL DAILY PRN
Qty: 1 TABLET | Refills: 0 | Status: ON HOLD | OUTPATIENT
Start: 2023-05-16 | End: 2023-06-08

## 2023-05-16 NOTE — TELEPHONE ENCOUNTER
Patient is calling to report increased anxiety.  He is wondering if there is any way he can get a one-time dose of anxiety medication to help.  He has been having increased anxiety for the last couple days and now is getting chest cramping due to this.This is causing him to become more anxious.  He does not want to go to the hospital to get an anxiety medication. He denies any new stressors.     Patient does plan on attending appointment tomorrow.  Message sent to provider with request is there anyway - really anxious last.

## 2023-05-16 NOTE — TELEPHONE ENCOUNTER
[facility] Call Center    Phone Message    May a detailed message be left on voicemail: yes     Reason for Call: Other: Pt calling to discuss medications with the nurse.  Wanted to discuss getting a medication for anxiety.     Action Taken: Other: [facility]    Travel Screening: Not Applicable

## 2023-05-17 ENCOUNTER — VIRTUAL VISIT (OUTPATIENT)
Dept: PSYCHIATRY | Facility: CLINIC | Age: 33
End: 2023-05-17
Attending: NURSE PRACTITIONER
Payer: MEDICARE

## 2023-05-17 ENCOUNTER — TELEPHONE (OUTPATIENT)
Dept: SLEEP MEDICINE | Facility: CLINIC | Age: 33
End: 2023-05-17

## 2023-05-17 DIAGNOSIS — F25.0 SCHIZOAFFECTIVE DISORDER, BIPOLAR TYPE (H): Primary | ICD-10-CM

## 2023-05-17 DIAGNOSIS — F41.9 ANXIETY: Primary | ICD-10-CM

## 2023-05-17 DIAGNOSIS — F51.05 INSOMNIA DUE TO OTHER MENTAL DISORDER: ICD-10-CM

## 2023-05-17 DIAGNOSIS — F99 INSOMNIA DUE TO OTHER MENTAL DISORDER: ICD-10-CM

## 2023-05-17 DIAGNOSIS — F90.2 ATTENTION DEFICIT HYPERACTIVITY DISORDER (ADHD), COMBINED TYPE: ICD-10-CM

## 2023-05-17 PROCEDURE — 99214 OFFICE O/P EST MOD 30 MIN: CPT | Mod: VID | Performed by: NURSE PRACTITIONER

## 2023-05-17 RX ORDER — DEXTROAMPHETAMINE SACCHARATE, AMPHETAMINE ASPARTATE MONOHYDRATE, DEXTROAMPHETAMINE SULFATE AND AMPHETAMINE SULFATE 5; 5; 5; 5 MG/1; MG/1; MG/1; MG/1
20 CAPSULE, EXTENDED RELEASE ORAL DAILY
Qty: 30 CAPSULE | Refills: 0 | Status: ON HOLD | OUTPATIENT
Start: 2023-05-17 | End: 2023-06-08

## 2023-05-17 RX ORDER — OLANZAPINE 5 MG/1
5 TABLET ORAL AT BEDTIME
Qty: 30 TABLET | Refills: 2 | Status: ON HOLD | OUTPATIENT
Start: 2023-05-17 | End: 2023-06-08

## 2023-05-17 RX ORDER — CLONAZEPAM 1 MG/1
1 TABLET ORAL DAILY PRN
Qty: 5 TABLET | Refills: 0 | Status: ON HOLD | OUTPATIENT
Start: 2023-05-17 | End: 2023-06-08

## 2023-05-17 RX ORDER — OLANZAPINE 20 MG/1
20 TABLET ORAL AT BEDTIME
Qty: 30 TABLET | Refills: 2 | Status: ON HOLD | OUTPATIENT
Start: 2023-05-17 | End: 2023-06-08

## 2023-05-17 ASSESSMENT — ANXIETY QUESTIONNAIRES
8. IF YOU CHECKED OFF ANY PROBLEMS, HOW DIFFICULT HAVE THESE MADE IT FOR YOU TO DO YOUR WORK, TAKE CARE OF THINGS AT HOME, OR GET ALONG WITH OTHER PEOPLE?: VERY DIFFICULT
IF YOU CHECKED OFF ANY PROBLEMS ON THIS QUESTIONNAIRE, HOW DIFFICULT HAVE THESE PROBLEMS MADE IT FOR YOU TO DO YOUR WORK, TAKE CARE OF THINGS AT HOME, OR GET ALONG WITH OTHER PEOPLE: VERY DIFFICULT
GAD7 TOTAL SCORE: 15
7. FEELING AFRAID AS IF SOMETHING AWFUL MIGHT HAPPEN: NOT AT ALL
1. FEELING NERVOUS, ANXIOUS, OR ON EDGE: NEARLY EVERY DAY
3. WORRYING TOO MUCH ABOUT DIFFERENT THINGS: NEARLY EVERY DAY
7. FEELING AFRAID AS IF SOMETHING AWFUL MIGHT HAPPEN: NOT AT ALL
GAD7 TOTAL SCORE: 15
2. NOT BEING ABLE TO STOP OR CONTROL WORRYING: NEARLY EVERY DAY
6. BECOMING EASILY ANNOYED OR IRRITABLE: NOT AT ALL
5. BEING SO RESTLESS THAT IT IS HARD TO SIT STILL: NEARLY EVERY DAY
4. TROUBLE RELAXING: NEARLY EVERY DAY
GAD7 TOTAL SCORE: 15

## 2023-05-17 ASSESSMENT — PATIENT HEALTH QUESTIONNAIRE - PHQ9
10. IF YOU CHECKED OFF ANY PROBLEMS, HOW DIFFICULT HAVE THESE PROBLEMS MADE IT FOR YOU TO DO YOUR WORK, TAKE CARE OF THINGS AT HOME, OR GET ALONG WITH OTHER PEOPLE: SOMEWHAT DIFFICULT
SUM OF ALL RESPONSES TO PHQ QUESTIONS 1-9: 9
SUM OF ALL RESPONSES TO PHQ QUESTIONS 1-9: 9
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
## 2023-05-17 NOTE — TELEPHONE ENCOUNTER
Pt was called to reschedule appt on 5/22 due to insurance coverage changes. Appt needs to be in person. Gave pt next available appt and asked them to call back if that doesn't work for them. Will send a [employer] message as well

## 2023-05-17 NOTE — PATIENT INSTRUCTIONS
-Discontinue Lexapro.  -Continue all other medication regimen including restart of Adderall XR 20 mg daily while monitoring for hypomania.    -Follow up in 2 weeks.    **For crisis resources, please see the information at the end of this document**   Patient Education    Thank you for coming to the [facility] [place] [facility].     Lab Testing:  If you had lab testing today and your results are reassuring or normal they will be mailed to you or sent through [employer] within 7 days. If the lab tests need quick action we will call you with the results. The phone number we will call with results is # [phone]. If this is not the best number please call our clinic and change the number.     Medication Refills:  If you need any refills please call your pharmacy and they will contact us. Our fax number for refills is [phone].   Three business days of notice are needed for general medication refill requests.   Five business days of notice are needed for controlled substance refill requests.   If you need to change to a different pharmacy, please contact the new pharmacy directly. The new pharmacy will help you get your medications transferred.     Contact Us:  Please call [phone] during business hours (8-5:00 M-F).   If you have medication related questions after clinic hours, or on the weekend, please call [phone].     Financial Assistance [phone]   Medical Records [phone]       MENTAL HEALTH CRISIS RESOURCES:  For a emergency help, please call 911 or go to the nearest Emergency Department.     Emergency Walk-In Options:   EmPATH Unit @ [place] ([place]): [phone] - Specialized mental health emergency area designed to be calming  [facility] ([place]): [phone]  [facility] ([place]): [phone]  [facility] ([place]): [phone]    [facility] Crisis Information:   [place]:  [phone]  [place]: [phone]  [place] (COP[place]) - Adult: [phone]     Child: [phone]  [place] - Adult: [phone]     Child: [phone]  [place]: [phone]  List of all [facility] resources:   https://[place].gov/dhs/people-we-serve/adults/health-care/mental-health/resources/crisis-contacts.jsp    National Crisis Information:   Crisis Text Line: Text  [place]  to 300198  Suicide & Crisis Lifeline: 988  National Suicide Prevention Lifeline: 5-487-490-TALK (1-324.738.8534)       For online chat options, visit https://suicidepreventionlifeline.org/chat/  Poison Control Center: 0-635-488-7929  Trans Lifeline: 1-760.610.2365 - Hotline for transgender people of all ages  [employer]: 1-090-701-9008 - Hotline for LGBT youth     For Non-Emergency Support:   Fast Tracker: Mental Health & Substance Use Disorder Resources -   https://www.XAircraftckGruburgn.org/

## 2023-05-17 NOTE — NURSING NOTE
Is the patient currently in the state of [place]? YES    Visit mode:VIDEO    If the visit is dropped, the patient can be reconnected by: VIDEO VISIT: Send to e-mail at: [email or web address]    Will anyone else be joining the visit? NO      How would you like to obtain your AVS? MyChart    Are changes needed to the allergy or medication list? NO    Reason for visit: Video Visit

## 2023-05-18 NOTE — PROGRESS NOTES
Assessment & Plan     Type 2 diabetes mellitus with hyperglycemia, without long-term current use of insulin (H)  Previous A1c reviewed.  Will increase Lantus insulin from 11 units to 15 units daily.  To take half dose if does not eat due to nausea.  We will plan follow-up appointment in 2 months.  Tolerating semaglutide well.  Previous labs reviewed.  - Continuous Blood Gluc  (FREESTYLE COLTEN 2 READER) ZEINAB; Apply 1 each topically once for 1 dose Use to read blood sugars per 's instructions.  - Continuous Blood Gluc Sensor (FREESTYLE COLTEN 2 SENSOR) MISC; Inject 1 each Subcutaneous every 14 days Use 1 sensor every 14 days. Use to read blood sugars per 's instructions.  - insulin glargine (LANTUS PEN) 100 UNIT/ML pen; Take 15 units daily.  Take half dose, 7 units, on days you do not eat  - ASPIRIN NOT PRESCRIBED (INTENTIONAL); Please choose reason not prescribed from choices below.  - ACE/ARB/ARNI NOT PRESCRIBED (INTENTIONAL); Please choose reason not prescribed from choices below.  - FOOT EXAM    Elevated LFTs  Previous labs reviewed.  We will arrange for ultrasound.  Avoid over-the-counter Tylenol/acetaminophen and alcohol  - US Abdomen Limited; Future  - Adult GI  Referral - Consult Only; Future  - Hepatic panel (Albumin, ALT, AST, Bili, Alk Phos, TP); Future    Hypertriglyceridemia  Lipids reviewed.  Has been prescribed fish oil-reports was unable to get from pharmacy.  Declines wanting to take fish oil.  We will plan to continue to monitor lipids.      Gastroesophageal reflux disease without esophagitis  Famotidine/Pepcid ineffective.  Prescription sent for omeprazole.  Educated on use and possible side effects.  - omeprazole (PRILOSEC) 20 MG DR capsule; Take 1 capsule (20 mg) by mouth daily    Hypertension, unspecified type  Stable-blood pressure well controlled today in clinic.  Has refills of amlodipine available.  We will recheck lab here today  - Basic metabolic  panel; Future    Schizoaffective disorder, bipolar type (H)  Most recent psychiatry notes reviewed.  Continue to follow closely with psychiatry    Tobacco abuse  Declines wanting to quit    Nausea and vomiting, unspecified vomiting type  Prescription sent for Zofran to have available to take when needed.  We will refer to GI due to ongoing nausea  - ondansetron (ZOFRAN ODT) 4 MG ODT tab; Take 1 tablet (4 mg) by mouth every 8 hours as needed for nausea  - Adult GI  Referral - Consult Only; Future    Fatty liver disease, nonalcoholic  Previous imaging and labs reviewed.  We will arrange for ultrasound of liver.  We will recheck labs here today.  Continue to avoid over-the-counter Tylenol/acetaminophen and alcohol.  Refer to GI  - US Abdomen Limited; Future  - Adult GI  Referral - Consult Only; Future  - Hepatic panel (Albumin, ALT, AST, Bili, Alk Phos, TP); Future    High risk medication use  - Vitamin D Deficiency; Future    Review of external notes as documented elsewhere in note  Review of the result(s) of each unique test - Labs, imaging  Ordering of each unique test  Prescription drug management  53 minutes spent by me on the date of the encounter doing chart review, history and exam, documentation and further activities per the note   MED REC REQUIRED  Post Medication Reconciliation Status: discharge medications reconciled and changed, per note/orders    [facility] SHAILESH Randall is a 32 year old, presenting for the following health issues:  ER F/U        5/3/2023    11:09 AM   Additional Questions   Roomed by MP   Accompanied by TYREL     HPI     ED/UC Followup:    Facility:  [facility]  Date of visit: 05/08/23  Reason for visit:    Nausea  Abdominal cramping;   Abnormal transaminases  Current Status: Nausea, chest pain persisting    *Discuss lab results      Here today for multiple concerns.  Feels like mental health  has been stable and physical health is getting worse.  Having cramping pain to chest, neck and ribs.  Body feels drained of energy.  When gets the cramps really increases anxiety.  Has tried muscle relaxers which are ineffective, warm pack which is ineffective in helping to decrease muscle cramps.    Nausea all the time, thinks is due to anxiety. No worse than usual. Has used zofran in the past for nausea and that was effective.  Heartburn/reflux is worse.  Has been taking famotidine 40 mg twice per day.  Is getting fluid into mouth.  Happens more when burps or lays down.    Has been checking blood sugars at various times throughout the day.  Blood sugars have been consistently over 400.  Is not using Dexcom, Dexcom has to get filled through a special pharmacy. Not able to get it from normal pharmacy.  Has a hard time remembering to take the insulin before bed.  Uncertain what time to take it if does not sleep.  Has missed many doses of insulin.  Does check feet for sores, does not have any    Last A1C: May 5-9.1.  Last Eye Exam: November.  The eye doc-Shoshana Webster.  Normal eye exam  Last Urine Micro: Over 1 year ago  On ASA: No, not prescribed intentionally-not indicated based on age  On ACE/ARB: No, not prescribed intentionally due to of childbearing years  On Statin: Crestor/rosuvastatin  Last Foot Exam: Today  B/P controlled: Yes  Smoker: Yes    Had labs that showed elevated liver function.  Does not take any over-the-counter Tylenol/acetaminophen.  Does not drink alcohol.  Does have history of fatty liver disease which was seen on CT scan.  Has never had ultrasound    Concerned that may never be able to have top surgery completed.  Top surgery is very important to him.    Review of Systems   Constitutional: Negative for fatigue.   HENT: Negative for ear pain, rhinorrhea and sore throat.    Eyes: Negative for visual disturbance.   Respiratory: Negative for cough, chest tightness, shortness of breath and  "wheezing.    Cardiovascular: Negative for chest pain and palpitations.   Gastrointestinal: Positive for nausea. Negative for abdominal distention, abdominal pain, constipation, diarrhea and vomiting.        Reflux     Endocrine: Negative for cold intolerance and heat intolerance.   Musculoskeletal: Positive for arthralgias. Negative for myalgias.   Skin: Negative for rash.   Neurological: Negative for dizziness, light-headedness, numbness and headaches.   Psychiatric/Behavioral: Positive for hallucinations and sleep disturbance. Negative for dysphoric mood. The patient is nervous/anxious.           Objective    /88   Pulse 120   Temp 98.5  F (36.9  C) (Tympanic)   Resp 18   Ht 1.657 m (5' 5.25\")   Wt 108.9 kg (240 lb)   LMP  (LMP Unknown)   SpO2 96%   BMI 39.63 kg/m    Body mass index is 39.63 kg/m .  Physical Exam  Constitutional:       Appearance: Normal appearance. He is well-developed.   HENT:      Head: Normocephalic and atraumatic.      Right Ear: Tympanic membrane and external ear normal. No middle ear effusion.      Left Ear: Tympanic membrane and external ear normal.  No middle ear effusion.      Nose: No mucosal edema.   Neck:      Thyroid: No thyromegaly.      Vascular: No carotid bruit.   Cardiovascular:      Rate and Rhythm: Normal rate and regular rhythm.      Heart sounds: Normal heart sounds.   Pulmonary:      Effort: Pulmonary effort is normal.      Breath sounds: Normal breath sounds.   Abdominal:      General: Bowel sounds are normal.      Palpations: Abdomen is soft.   Skin:     General: Skin is warm and dry.   Neurological:      Mental Status: He is alert.   Psychiatric:         Behavior: Behavior normal.                  "

## 2023-05-22 ENCOUNTER — OFFICE VISIT (OUTPATIENT)
Dept: FAMILY MEDICINE | Facility: CLINIC | Age: 33
End: 2023-05-22
Payer: MEDICARE

## 2023-05-22 VITALS
BODY MASS INDEX: 39.99 KG/M2 | OXYGEN SATURATION: 96 % | HEART RATE: 120 BPM | SYSTOLIC BLOOD PRESSURE: 138 MMHG | TEMPERATURE: 98.5 F | DIASTOLIC BLOOD PRESSURE: 88 MMHG | RESPIRATION RATE: 18 BRPM | HEIGHT: 65 IN | WEIGHT: 240 LBS

## 2023-05-22 DIAGNOSIS — E11.65 TYPE 2 DIABETES MELLITUS WITH HYPERGLYCEMIA, WITHOUT LONG-TERM CURRENT USE OF INSULIN (H): ICD-10-CM

## 2023-05-22 DIAGNOSIS — F25.0 SCHIZOAFFECTIVE DISORDER, BIPOLAR TYPE (H): ICD-10-CM

## 2023-05-22 DIAGNOSIS — I10 HYPERTENSION, UNSPECIFIED TYPE: ICD-10-CM

## 2023-05-22 DIAGNOSIS — Z72.0 TOBACCO ABUSE: ICD-10-CM

## 2023-05-22 DIAGNOSIS — R11.2 NAUSEA AND VOMITING, UNSPECIFIED VOMITING TYPE: ICD-10-CM

## 2023-05-22 DIAGNOSIS — K76.0 FATTY LIVER DISEASE, NONALCOHOLIC: ICD-10-CM

## 2023-05-22 DIAGNOSIS — R79.89 ELEVATED LFTS: Primary | ICD-10-CM

## 2023-05-22 DIAGNOSIS — K21.9 GASTROESOPHAGEAL REFLUX DISEASE WITHOUT ESOPHAGITIS: ICD-10-CM

## 2023-05-22 DIAGNOSIS — E78.1 HYPERTRIGLYCERIDEMIA: ICD-10-CM

## 2023-05-22 DIAGNOSIS — Z79.899 HIGH RISK MEDICATION USE: ICD-10-CM

## 2023-05-22 PROCEDURE — 99215 OFFICE O/P EST HI 40 MIN: CPT | Performed by: NURSE PRACTITIONER

## 2023-05-22 PROCEDURE — 99207 PR FOOT EXAM NO CHARGE: CPT | Performed by: NURSE PRACTITIONER

## 2023-05-22 RX ORDER — ONDANSETRON 4 MG/1
4 TABLET, ORALLY DISINTEGRATING ORAL EVERY 8 HOURS PRN
Qty: 30 TABLET | Refills: 2 | Status: ON HOLD | OUTPATIENT
Start: 2023-05-22 | End: 2023-06-08

## 2023-05-22 ASSESSMENT — ENCOUNTER SYMPTOMS
ABDOMINAL DISTENTION: 0
FATIGUE: 0
HEADACHES: 0
SHORTNESS OF BREATH: 0
NAUSEA: 1
ABDOMINAL PAIN: 0
CHEST TIGHTNESS: 0
SLEEP DISTURBANCE: 1
DIZZINESS: 0
ROS GI COMMENTS: REFLUX
ARTHRALGIAS: 1
HALLUCINATIONS: 1
VOMITING: 0
SORE THROAT: 0
NUMBNESS: 0
MYALGIAS: 0
RHINORRHEA: 0
PALPITATIONS: 0
DIARRHEA: 0
DYSPHORIC MOOD: 0
LIGHT-HEADEDNESS: 0
CONSTIPATION: 0
WHEEZING: 0
COUGH: 0
NERVOUS/ANXIOUS: 1

## 2023-05-22 ASSESSMENT — PAIN SCALES - GENERAL: PAINLEVEL: MODERATE PAIN (4)

## 2023-05-22 NOTE — PATIENT INSTRUCTIONS
Please send me blood sugar readings in 2-3 weeks    Ok to change your lantus to AM    Increase your lantus insulin to 15 units daily. Take 1/2 dose 7 Units if you do not eat all day    You can call imaging scheduling to set up appointment date, time, and location that works best for you to have US of liver [phone]

## 2023-05-24 ENCOUNTER — TELEPHONE (OUTPATIENT)
Dept: BEHAVIORAL HEALTH | Facility: CLINIC | Age: 33
End: 2023-05-24

## 2023-05-24 ENCOUNTER — HOSPITAL ENCOUNTER (INPATIENT)
Facility: CLINIC | Age: 33
LOS: 13 days | Discharge: HOME | DRG: 885 | End: 2023-06-09
Attending: FAMILY MEDICINE | Admitting: PSYCHIATRY & NEUROLOGY
Payer: MEDICARE

## 2023-05-24 ENCOUNTER — VIRTUAL VISIT (OUTPATIENT)
Dept: PSYCHIATRY | Facility: CLINIC | Age: 33
End: 2023-05-24
Attending: NURSE PRACTITIONER
Payer: MEDICARE

## 2023-05-24 ENCOUNTER — HOSPITAL ENCOUNTER (EMERGENCY)
Facility: CLINIC | Age: 33
Discharge: HOME | End: 2023-05-24
Payer: MEDICARE

## 2023-05-24 DIAGNOSIS — R11.2 NAUSEA AND VOMITING, UNSPECIFIED VOMITING TYPE: ICD-10-CM

## 2023-05-24 DIAGNOSIS — F33.1 MODERATE EPISODE OF RECURRENT MAJOR DEPRESSIVE DISORDER (H): ICD-10-CM

## 2023-05-24 DIAGNOSIS — E78.1 HYPERTRIGLYCERIDEMIA: ICD-10-CM

## 2023-05-24 DIAGNOSIS — L70.0 ACNE VULGARIS: ICD-10-CM

## 2023-05-24 DIAGNOSIS — I10 BENIGN ESSENTIAL HYPERTENSION: ICD-10-CM

## 2023-05-24 DIAGNOSIS — R45.851 SUICIDAL IDEATION: ICD-10-CM

## 2023-05-24 DIAGNOSIS — Z20.822 LAB TEST NEGATIVE FOR COVID-19 VIRUS: ICD-10-CM

## 2023-05-24 DIAGNOSIS — F99 INSOMNIA DUE TO OTHER MENTAL DISORDER: ICD-10-CM

## 2023-05-24 DIAGNOSIS — G89.29 CHRONIC LEFT-SIDED LOW BACK PAIN WITH LEFT-SIDED SCIATICA: ICD-10-CM

## 2023-05-24 DIAGNOSIS — K59.03 DRUG-INDUCED CONSTIPATION: ICD-10-CM

## 2023-05-24 DIAGNOSIS — M54.42 CHRONIC LEFT-SIDED LOW BACK PAIN WITH LEFT-SIDED SCIATICA: ICD-10-CM

## 2023-05-24 DIAGNOSIS — F25.9 SCHIZOPHRENIA, SCHIZOAFFECTIVE, CHRONIC WITH ACUTE EXACERBATION (H): ICD-10-CM

## 2023-05-24 DIAGNOSIS — F25.0 SCHIZOAFFECTIVE DISORDER, BIPOLAR TYPE (H): ICD-10-CM

## 2023-05-24 DIAGNOSIS — F90.2 ATTENTION DEFICIT HYPERACTIVITY DISORDER (ADHD), COMBINED TYPE: ICD-10-CM

## 2023-05-24 DIAGNOSIS — F25.9 SCHIZOAFFECTIVE DISORDER, CHRONIC CONDITION WITH ACUTE EXACERBATION (H): ICD-10-CM

## 2023-05-24 DIAGNOSIS — R68.2 DRY MOUTH: ICD-10-CM

## 2023-05-24 DIAGNOSIS — F41.9 ANXIETY: ICD-10-CM

## 2023-05-24 DIAGNOSIS — I10 HYPERTENSION, UNSPECIFIED TYPE: Primary | ICD-10-CM

## 2023-05-24 DIAGNOSIS — K21.9 GASTROESOPHAGEAL REFLUX DISEASE WITHOUT ESOPHAGITIS: ICD-10-CM

## 2023-05-24 DIAGNOSIS — F25.0 SCHIZOAFFECTIVE DISORDER, BIPOLAR TYPE (H): Primary | ICD-10-CM

## 2023-05-24 DIAGNOSIS — M51.369 DDD (DEGENERATIVE DISC DISEASE), LUMBAR: ICD-10-CM

## 2023-05-24 DIAGNOSIS — F51.05 INSOMNIA DUE TO OTHER MENTAL DISORDER: ICD-10-CM

## 2023-05-24 LAB
ALBUMIN SERPL BCG-MCNC: 4.7 G/DL (ref 3.5–5.2)
ALP SERPL-CCNC: 92 U/L (ref 35–129)
ALT SERPL W P-5'-P-CCNC: 101 U/L (ref 10–50)
ANION GAP SERPL CALCULATED.3IONS-SCNC: 15 MMOL/L (ref 7–15)
AST SERPL W P-5'-P-CCNC: ABNORMAL U/L
BASOPHILS # BLD AUTO: 0.1 10E3/UL (ref 0–0.2)
BASOPHILS NFR BLD AUTO: 0 %
BILIRUB SERPL-MCNC: 0.2 MG/DL
BUN SERPL-MCNC: 17.4 MG/DL (ref 6–20)
CALCIUM SERPL-MCNC: 10.8 MG/DL (ref 8.6–10)
CHLORIDE SERPL-SCNC: 100 MMOL/L (ref 98–107)
CREAT SERPL-MCNC: 0.75 MG/DL (ref 0.51–1.17)
DEPRECATED HCO3 PLAS-SCNC: 24 MMOL/L (ref 22–29)
EOSINOPHIL # BLD AUTO: 0.2 10E3/UL (ref 0–0.7)
EOSINOPHIL NFR BLD AUTO: 2 %
ERYTHROCYTE [DISTWIDTH] IN BLOOD BY AUTOMATED COUNT: 14.2 % (ref 10–15)
GFR SERPL CREATININE-BSD FRML MDRD: >90 ML/MIN/1.73M2
GLUCOSE BLDC GLUCOMTR-MCNC: 129 MG/DL (ref 70–99)
GLUCOSE BLDC GLUCOMTR-MCNC: 139 MG/DL (ref 70–99)
GLUCOSE BLDC GLUCOMTR-MCNC: 165 MG/DL (ref 70–99)
GLUCOSE SERPL-MCNC: 108 MG/DL (ref 70–99)
HCT VFR BLD AUTO: 45.8 % (ref 35–53)
HGB BLD-MCNC: 15.2 G/DL (ref 11.7–17.7)
IMM GRANULOCYTES # BLD: 0.1 10E3/UL
IMM GRANULOCYTES NFR BLD: 1 %
LIPASE SERPL-CCNC: 76 U/L (ref 13–60)
LYMPHOCYTES # BLD AUTO: 3.4 10E3/UL (ref 0.8–5.3)
LYMPHOCYTES NFR BLD AUTO: 30 %
MCH RBC QN AUTO: 27.9 PG (ref 26.5–33)
MCHC RBC AUTO-ENTMCNC: 33.2 G/DL (ref 31.5–36.5)
MCV RBC AUTO: 84 FL (ref 78–100)
MONOCYTES # BLD AUTO: 0.7 10E3/UL (ref 0–1.3)
MONOCYTES NFR BLD AUTO: 6 %
NEUTROPHILS # BLD AUTO: 6.8 10E3/UL (ref 1.6–8.3)
NEUTROPHILS NFR BLD AUTO: 61 %
NRBC # BLD AUTO: 0 10E3/UL
NRBC BLD AUTO-RTO: 0 /100
PLAT MORPH BLD: ABNORMAL
PLATELET # BLD AUTO: ABNORMAL 10*3/UL
POTASSIUM SERPL-SCNC: 3.7 MMOL/L (ref 3.4–5.3)
PROT SERPL-MCNC: 8.1 G/DL (ref 6.4–8.3)
RBC # BLD AUTO: 5.44 10E6/UL (ref 3.8–5.9)
RBC MORPH BLD: ABNORMAL
SODIUM SERPL-SCNC: 139 MMOL/L (ref 136–145)
WBC # BLD AUTO: 11.4 10E3/UL (ref 4–11)

## 2023-05-24 PROCEDURE — 36415 COLL VENOUS BLD VENIPUNCTURE: CPT | Performed by: FAMILY MEDICINE

## 2023-05-24 PROCEDURE — 250N000013 HC RX MED GY IP 250 OP 250 PS 637: Performed by: FAMILY MEDICINE

## 2023-05-24 PROCEDURE — 85018 HEMOGLOBIN: CPT | Performed by: FAMILY MEDICINE

## 2023-05-24 PROCEDURE — 84155 ASSAY OF PROTEIN SERUM: CPT | Performed by: FAMILY MEDICINE

## 2023-05-24 PROCEDURE — 82962 GLUCOSE BLOOD TEST: CPT

## 2023-05-24 PROCEDURE — 250N000012 HC RX MED GY IP 250 OP 636 PS 637: Performed by: FAMILY MEDICINE

## 2023-05-24 PROCEDURE — 83690 ASSAY OF LIPASE: CPT | Performed by: FAMILY MEDICINE

## 2023-05-24 PROCEDURE — 99285 EMERGENCY DEPT VISIT HI MDM: CPT | Performed by: FAMILY MEDICINE

## 2023-05-24 PROCEDURE — 99285 EMERGENCY DEPT VISIT HI MDM: CPT | Mod: 25 | Performed by: FAMILY MEDICINE

## 2023-05-24 PROCEDURE — 99214 OFFICE O/P EST MOD 30 MIN: CPT | Mod: VID | Performed by: NURSE PRACTITIONER

## 2023-05-24 PROCEDURE — 90791 PSYCH DIAGNOSTIC EVALUATION: CPT

## 2023-05-24 RX ORDER — AMLODIPINE BESYLATE 10 MG/1
10 TABLET ORAL DAILY
Status: DISCONTINUED | OUTPATIENT
Start: 2023-05-25 | End: 2023-06-09 | Stop reason: HOSPADM

## 2023-05-24 RX ORDER — CLONIDINE HYDROCHLORIDE 0.1 MG/1
0.1 TABLET ORAL DAILY PRN
Status: DISCONTINUED | OUTPATIENT
Start: 2023-05-24 | End: 2023-06-09 | Stop reason: HOSPADM

## 2023-05-24 RX ORDER — NICOTINE POLACRILEX 4 MG
15-30 LOZENGE BUCCAL
Status: DISCONTINUED | OUTPATIENT
Start: 2023-05-24 | End: 2023-06-09 | Stop reason: HOSPADM

## 2023-05-24 RX ORDER — DEXTROAMPHETAMINE SACCHARATE, AMPHETAMINE ASPARTATE MONOHYDRATE, DEXTROAMPHETAMINE SULFATE AND AMPHETAMINE SULFATE 5; 5; 5; 5 MG/1; MG/1; MG/1; MG/1
20 CAPSULE, EXTENDED RELEASE ORAL DAILY
Status: DISCONTINUED | OUTPATIENT
Start: 2023-05-25 | End: 2023-05-27

## 2023-05-24 RX ORDER — OLANZAPINE 20 MG/1
20 TABLET ORAL AT BEDTIME
Status: DISCONTINUED | OUTPATIENT
Start: 2023-05-24 | End: 2023-05-26

## 2023-05-24 RX ORDER — OLANZAPINE 5 MG/1
5 TABLET ORAL AT BEDTIME
Status: DISCONTINUED | OUTPATIENT
Start: 2023-05-24 | End: 2023-05-26

## 2023-05-24 RX ORDER — DOXYCYCLINE HYCLATE 50 MG/1
100 CAPSULE ORAL 2 TIMES DAILY
Status: DISCONTINUED | OUTPATIENT
Start: 2023-05-24 | End: 2023-06-09 | Stop reason: HOSPADM

## 2023-05-24 RX ORDER — GABAPENTIN 600 MG/1
1200 TABLET ORAL 3 TIMES DAILY
Status: DISCONTINUED | OUTPATIENT
Start: 2023-05-24 | End: 2023-06-09 | Stop reason: HOSPADM

## 2023-05-24 RX ORDER — DEXTROSE MONOHYDRATE 25 G/50ML
25-50 INJECTION, SOLUTION INTRAVENOUS
Status: DISCONTINUED | OUTPATIENT
Start: 2023-05-24 | End: 2023-06-09 | Stop reason: HOSPADM

## 2023-05-24 RX ORDER — ROSUVASTATIN CALCIUM 10 MG/1
10 TABLET, COATED ORAL DAILY
Status: DISCONTINUED | OUTPATIENT
Start: 2023-05-25 | End: 2023-06-09 | Stop reason: HOSPADM

## 2023-05-24 RX ADMIN — GABAPENTIN 1200 MG: 600 TABLET, FILM COATED ORAL at 20:59

## 2023-05-24 RX ADMIN — OLANZAPINE 5 MG: 5 TABLET, FILM COATED ORAL at 21:00

## 2023-05-24 RX ADMIN — OLANZAPINE 20 MG: 10 TABLET, FILM COATED ORAL at 21:00

## 2023-05-24 RX ADMIN — DOXYCYCLINE HYCLATE 100 MG: 50 CAPSULE ORAL at 21:30

## 2023-05-24 RX ADMIN — INSULIN GLARGINE 15 UNITS: 100 INJECTION, SOLUTION SUBCUTANEOUS at 21:27

## 2023-05-24 RX ADMIN — NICOTINE POLACRILEX 4 MG: 4 GUM, CHEWING BUCCAL at 20:59

## 2023-05-24 ASSESSMENT — COLUMBIA-SUICIDE SEVERITY RATING SCALE - C-SSRS
REASONS FOR IDEATION SINCE LAST CONTACT: COMPLETELY TO END OR STOP THE PAIN (YOU COULDN'T GO ON LIVING WITH THE PAIN OR HOW YOU WERE FEELING)
LETHALITY/MEDICAL DAMAGE CODE MOST LETHAL ACTUAL ATTEMPT: MINOR PHYSICAL DAMAGE
ATTEMPT LIFETIME: YES
1. IN THE PAST MONTH, HAVE YOU WISHED YOU WERE DEAD OR WISHED YOU COULD GO TO SLEEP AND NOT WAKE UP?: YES
6. HAVE YOU EVER DONE ANYTHING, STARTED TO DO ANYTHING, OR PREPARED TO DO ANYTHING TO END YOUR LIFE?: YES
TOTAL  NUMBER OF ABORTED OR SELF INTERRUPTED ATTEMPTS SINCE LAST CONTACT: NO
TOTAL  NUMBER OF ABORTED OR SELF INTERRUPTED ATTEMPTS LIFETIME: NO
LETHALITY/MEDICAL DAMAGE CODE MOST RECENT POTENTIAL ATTEMPT: BEHAVIOR NOT LIKELY TO RESULT IN INJURY
5. HAVE YOU STARTED TO WORK OUT OR WORKED OUT THE DETAILS OF HOW TO KILL YOURSELF? DO YOU INTEND TO CARRY OUT THIS PLAN?: YES
ATTEMPT SINCE LAST CONTACT: NO
SUICIDE, SINCE LAST CONTACT: NO
5. HAVE YOU STARTED TO WORK OUT OR WORKED OUT THE DETAILS OF HOW TO KILL YOURSELF? DO YOU INTEND TO CARRY OUT THIS PLAN?: YES
4. HAVE YOU HAD THESE THOUGHTS AND HAD SOME INTENTION OF ACTING ON THEM?: YES
LETHALITY/MEDICAL DAMAGE CODE MOST LETHAL POTENTIAL ATTEMPT: BEHAVIOR NOT LIKELY TO RESULT IN INJURY
6. HAVE YOU EVER DONE ANYTHING, STARTED TO DO ANYTHING, OR PREPARED TO DO ANYTHING TO END YOUR LIFE?: NO
REASONS FOR IDEATION PAST MONTH: COMPLETELY TO END OR STOP THE PAIN (YOU COULDN'T GO ON LIVING WITH THE PAIN OR HOW YOU WERE FEELING)
ATTEMPT PAST THREE MONTHS: NO
4. HAVE YOU HAD THESE THOUGHTS AND HAD SOME INTENTION OF ACTING ON THEM?: YES
2. HAVE YOU ACTUALLY HAD ANY THOUGHTS OF KILLING YOURSELF?: YES
TOTAL  NUMBER OF ACTUAL ATTEMPTS LIFETIME: 3
1. SINCE LAST CONTACT, HAVE YOU WISHED YOU WERE DEAD OR WISHED YOU COULD GO TO SLEEP AND NOT WAKE UP?: YES
TOTAL  NUMBER OF INTERRUPTED ATTEMPTS SINCE LAST CONTACT: NO
LETHALITY/MEDICAL DAMAGE CODE FIRST ACTUAL ATTEMPT: NO PHYSICAL DAMAGE OR VERY MINOR PHYSICAL DAMAGE
REASONS FOR IDEATION LIFETIME: EQUALLY TO GET ATTENTION, REVENGE, OR A REACTION FROM OTHERS AND TO END/STOP THE PAIN
3. HAVE YOU BEEN THINKING ABOUT HOW YOU MIGHT KILL YOURSELF?: YES
6. HAVE YOU EVER DONE ANYTHING, STARTED TO DO ANYTHING, OR PREPARED TO DO ANYTHING TO END YOUR LIFE?: YES
TOTAL  NUMBER OF INTERRUPTED ATTEMPTS LIFETIME: NO
LETHALITY/MEDICAL DAMAGE CODE FIRST POTENTIAL ATTEMPT: BEHAVIOR NOT LIKELY TO RESULT IN INJURY
2. HAVE YOU ACTUALLY HAD ANY THOUGHTS OF KILLING YOURSELF?: YES
LETHALITY/MEDICAL DAMAGE CODE MOST RECENT ACTUAL ATTEMPT: NO PHYSICAL DAMAGE OR VERY MINOR PHYSICAL DAMAGE
1. HAVE YOU WISHED YOU WERE DEAD OR WISHED YOU COULD GO TO SLEEP AND NOT WAKE UP?: YES

## 2023-05-24 ASSESSMENT — ACTIVITIES OF DAILY LIVING (ADL)
ADLS_ACUITY_SCORE: 37
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
## 2023-05-24 NOTE — NURSING NOTE
Is the patient currently in the state of [place]? YES    Visit mode:VIDEO    If the visit is dropped, the patient can be reconnected by: VIDEO VISIT: Text to cell phone: [phone]    Will anyone else be joining the visit? NO      How would you like to obtain your AVS? MyChart    Are changes needed to the allergy or medication list? NO    Reason for visit: RECHECK    PHQ not complete per department protocol.    SARAH Celaya on 5/24/2023 at 2:04 PM

## 2023-05-24 NOTE — CONFIDENTIAL NOTE
DIAGNOSIS:    Elevated LFTs   Nausea and vomiting, unspecified vomiting type   Fatty liver disease, nonalcoholic      Appt Date: 07.20.2023   NOTES STATUS DETAILS   OFFICE NOTE from referring provider Internal 05.22.2023 Becki Avery APRN CNP   OFFICE NOTES from other specialists     DISCHARGE SUMMARY from hospital     MEDICATION LIST Internal    LIVER BIOSPY (IF APPLICABLE)      PATHOLOGY REPORTS      IMAGING     ENDOSCOPY (IF AVAILABLE)     COLONOSCOPY (IF AVAILABLE)     ULTRASOUND LIVER     CT OF ABDOMEN Internal 01.16.2022 CT ABDOMEN PELVIS W/O CONTRAST   MRI OF LIVER     FIBROSCAN, US ELASTOGRAPHY, FIBROSIS SCAN, MR ELASTOGRAPHY     LABS     HEPATIC PANEL (LIVER PANEL) Care Everywhere 05.08.2023   BASIC METABOLIC PANEL Care Everywhere 05.08.2023   COMPLETE METABOLIC PANEL Care Everywhere 05.08.2023   COMPLETE BLOOD COUNT (CBC) Care Everywhere 05.08.2-23   INTERNATIONAL NORMALIZED RATIO (INR)     HEPATITIS C ANTIBODY Internal 05.27.2022   HEPATITIS C VIRAL LOAD/PCR     HEPATITIS C GENOTYPE     HEPATITIS B SURFACE ANTIGEN     HEPATITIS B SURFACE ANTIBODY     HEPATITIS B DNA QUANT LEVEL     HEPATITIS B CORE ANTIBODY

## 2023-05-24 NOTE — ED TRIAGE NOTES
Triage Assessment     Row Name 05/24/23 9955       Triage Assessment (Adult)    Airway WDL WDL       Respiratory WDL    Respiratory WDL X  SOB       Skin Circulation/Temperature WDL    Skin Circulation/Temperature WDL WDL       Cardiac WDL    Cardiac WDL WDL       Peripheral/Neurovascular WDL    Peripheral Neurovascular WDL WDL       Cognitive/Neuro/Behavioral WDL    Cognitive/Neuro/Behavioral WDL X;mood/behavior    Mood/Behavior anxious

## 2023-05-24 NOTE — ED NOTES
Bed: ED07  Expected date:   Expected time:   Means of arrival:   Comments:  N733  30yM  Anxiety  To TRIAGE if appropriate

## 2023-05-24 NOTE — ED NOTES
Pt called 3 times. Pt is not in ED lobby. Pt LWBS before triage. He did not sign the declination form.

## 2023-05-24 NOTE — PATIENT INSTRUCTIONS
**For crisis resources, please see the information at the end of this document**   Patient Education    Thank you for coming to the [facility] [place] [facility].     Lab Testing:  If you had lab testing today and your results are reassuring or normal they will be mailed to you or sent through [employer] within 7 days. If the lab tests need quick action we will call you with the results. The phone number we will call with results is # [phone]. If this is not the best number please call our clinic and change the number.     Medication Refills:  If you need any refills please call your pharmacy and they will contact us. Our fax number for refills is [phone].   Three business days of notice are needed for general medication refill requests.   Five business days of notice are needed for controlled substance refill requests.   If you need to change to a different pharmacy, please contact the new pharmacy directly. The new pharmacy will help you get your medications transferred.     Contact Us:  Please call [phone] during business hours (8-5:00 M-F).   If you have medication related questions after clinic hours, or on the weekend, please call [phone].     Financial Assistance [phone]   Medical Records [phone]       MENTAL HEALTH CRISIS RESOURCES:  For a emergency help, please call 911 or go to the nearest Emergency Department.     Emergency Walk-In Options:   EmPATH Unit @ [facility] ([place]): [phone] - Specialized mental health emergency area designed to be calming  [facility] [place] ([place]): [phone]  [facility] ([place]): [phone]  [facility]): [phone]    [facility] Crisis Information:   [place]: [phone]  [place]: [phone]  [place] ([place]) - Adult: [phone]     Child: [phone]  [place] - Adult: [phone]     Child: [phone]  [place]:  [phone]  List of all [facility] resources:   https://[place].gov/dhs/people-we-serve/adults/health-care/mental-health/resources/crisis-contacts.jsp    National Crisis Information:   Crisis Text Line: Text  [place]  to 125266  Suicide & Crisis Lifeline: 988  National Suicide Prevention Lifeline: 0-570-071-TALK (1-261.376.3488)       For online chat options, visit https://suicidepreventionlifeline.org/chat/  Poison Control Center: 2-126-135-1067  Trans Lifeline: 3-435-089-2473 - Hotline for transgender people of all ages  [employer]: 4-927-437-1484 - Hotline for LGBT youth     For Non-Emergency Support:   Fast Tracker: Mental Health & Substance Use Disorder Resources -   https://www.[employer].org/
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
## 2023-05-25 ENCOUNTER — TELEPHONE (OUTPATIENT)
Dept: BEHAVIORAL HEALTH | Facility: CLINIC | Age: 33
End: 2023-05-25
Payer: MEDICARE

## 2023-05-25 ENCOUNTER — DOCUMENTATION ONLY (OUTPATIENT)
Dept: PLASTIC SURGERY | Facility: CLINIC | Age: 33
End: 2023-05-25
Payer: MEDICARE

## 2023-05-25 LAB
ALBUMIN UR-MCNC: 10 MG/DL
AMPHETAMINES UR QL SCN: ABNORMAL
APPEARANCE UR: CLEAR
BARBITURATES UR QL SCN: ABNORMAL
BENZODIAZ UR QL SCN: ABNORMAL
BILIRUB UR QL STRIP: NEGATIVE
BZE UR QL SCN: ABNORMAL
CANNABINOIDS UR QL SCN: ABNORMAL
COLOR UR AUTO: ABNORMAL
GLUCOSE BLDC GLUCOMTR-MCNC: 116 MG/DL (ref 70–99)
GLUCOSE BLDC GLUCOMTR-MCNC: 167 MG/DL (ref 70–99)
GLUCOSE BLDC GLUCOMTR-MCNC: 202 MG/DL (ref 70–99)
GLUCOSE BLDC GLUCOMTR-MCNC: 246 MG/DL (ref 70–99)
GLUCOSE UR STRIP-MCNC: 50 MG/DL
HGB UR QL STRIP: NEGATIVE
KETONES UR STRIP-MCNC: NEGATIVE MG/DL
LEUKOCYTE ESTERASE UR QL STRIP: ABNORMAL
MUCOUS THREADS #/AREA URNS LPF: PRESENT /LPF
NITRATE UR QL: NEGATIVE
OPIATES UR QL SCN: ABNORMAL
PH UR STRIP: 6.5 [PH] (ref 5–7)
RBC URINE: 0 /HPF
SARS-COV-2 RNA RESP QL NAA+PROBE: NEGATIVE
SP GR UR STRIP: 1.02 (ref 1–1.03)
SQUAMOUS EPITHELIAL: 2 /HPF
TRANSITIONAL EPI: <1 /HPF
UROBILINOGEN UR STRIP-MCNC: NORMAL MG/DL
WBC URINE: 58 /HPF

## 2023-05-25 PROCEDURE — 87635 SARS-COV-2 COVID-19 AMP PRB: CPT | Performed by: EMERGENCY MEDICINE

## 2023-05-25 PROCEDURE — 82962 GLUCOSE BLOOD TEST: CPT

## 2023-05-25 PROCEDURE — 80307 DRUG TEST PRSMV CHEM ANLYZR: CPT | Performed by: FAMILY MEDICINE

## 2023-05-25 PROCEDURE — 250N000013 HC RX MED GY IP 250 OP 250 PS 637: Performed by: FAMILY MEDICINE

## 2023-05-25 PROCEDURE — 81001 URINALYSIS AUTO W/SCOPE: CPT | Performed by: FAMILY MEDICINE

## 2023-05-25 PROCEDURE — 250N000012 HC RX MED GY IP 250 OP 636 PS 637: Performed by: EMERGENCY MEDICINE

## 2023-05-25 PROCEDURE — 87086 URINE CULTURE/COLONY COUNT: CPT | Performed by: FAMILY MEDICINE

## 2023-05-25 PROCEDURE — C9803 HOPD COVID-19 SPEC COLLECT: HCPCS | Performed by: FAMILY MEDICINE

## 2023-05-25 RX ORDER — CLONAZEPAM 1 MG/1
1 TABLET ORAL DAILY PRN
Status: DISCONTINUED | OUTPATIENT
Start: 2023-05-25 | End: 2023-05-26

## 2023-05-25 RX ORDER — OLANZAPINE 5 MG/1
5 TABLET, ORALLY DISINTEGRATING ORAL 2 TIMES DAILY PRN
Status: DISCONTINUED | OUTPATIENT
Start: 2023-05-25 | End: 2023-05-27

## 2023-05-25 RX ADMIN — DOXYCYCLINE HYCLATE 100 MG: 50 CAPSULE ORAL at 19:47

## 2023-05-25 RX ADMIN — INSULIN GLARGINE 15 UNITS: 100 INJECTION, SOLUTION SUBCUTANEOUS at 14:23

## 2023-05-25 RX ADMIN — GABAPENTIN 1200 MG: 600 TABLET, FILM COATED ORAL at 08:22

## 2023-05-25 RX ADMIN — AMLODIPINE BESYLATE 10 MG: 10 TABLET ORAL at 08:21

## 2023-05-25 RX ADMIN — GABAPENTIN 1200 MG: 600 TABLET, FILM COATED ORAL at 19:47

## 2023-05-25 RX ADMIN — OMEPRAZOLE 20 MG: 20 CAPSULE, DELAYED RELEASE ORAL at 08:21

## 2023-05-25 RX ADMIN — NICOTINE POLACRILEX 4 MG: 4 GUM, CHEWING BUCCAL at 13:37

## 2023-05-25 RX ADMIN — ROSUVASTATIN CALCIUM 10 MG: 10 TABLET, FILM COATED ORAL at 08:21

## 2023-05-25 RX ADMIN — GABAPENTIN 1200 MG: 600 TABLET, FILM COATED ORAL at 14:24

## 2023-05-25 RX ADMIN — NICOTINE POLACRILEX 4 MG: 4 GUM, CHEWING BUCCAL at 10:46

## 2023-05-25 RX ADMIN — OLANZAPINE 20 MG: 10 TABLET, FILM COATED ORAL at 19:46

## 2023-05-25 RX ADMIN — CLONIDINE HYDROCHLORIDE 0.1 MG: 0.1 TABLET ORAL at 09:23

## 2023-05-25 RX ADMIN — CLONIDINE HYDROCHLORIDE 0.1 MG: 0.1 TABLET ORAL at 19:47

## 2023-05-25 RX ADMIN — DOXYCYCLINE HYCLATE 100 MG: 50 CAPSULE ORAL at 08:21

## 2023-05-25 RX ADMIN — OLANZAPINE 5 MG: 5 TABLET, FILM COATED ORAL at 22:14

## 2023-05-25 RX ADMIN — CLONAZEPAM 1 MG: 1 TABLET ORAL at 14:24

## 2023-05-25 RX ADMIN — NICOTINE POLACRILEX 4 MG: 4 GUM, CHEWING BUCCAL at 20:20

## 2023-05-25 RX ADMIN — NICOTINE POLACRILEX 4 MG: 4 GUM, CHEWING BUCCAL at 08:31

## 2023-05-25 RX ADMIN — NICOTINE POLACRILEX 4 MG: 4 GUM, CHEWING BUCCAL at 15:22

## 2023-05-25 RX ADMIN — DEXTROAMPHETAMINE SULFATE, DEXTROAMPHETAMINE SACCHARATE, AMPHETAMINE SULFATE AND AMPHETAMINE ASPARTATE 20 MG: 1.25; 1.25; 1.25; 1.25 CAPSULE, EXTENDED RELEASE ORAL at 08:21

## 2023-05-25 RX ADMIN — NICOTINE POLACRILEX 4 MG: 4 GUM, CHEWING BUCCAL at 09:23

## 2023-05-25 RX ADMIN — NICOTINE POLACRILEX 4 MG: 4 GUM, CHEWING BUCCAL at 18:25

## 2023-05-25 ASSESSMENT — ACTIVITIES OF DAILY LIVING (ADL)
ADLS_ACUITY_SCORE: 37

## 2023-05-25 ASSESSMENT — COLUMBIA-SUICIDE SEVERITY RATING SCALE - C-SSRS
REASONS FOR IDEATION SINCE LAST CONTACT: COMPLETELY TO END OR STOP THE PAIN (YOU COULDN'T GO ON LIVING WITH THE PAIN OR HOW YOU WERE FEELING)
ATTEMPT SINCE LAST CONTACT: NO
1. SINCE LAST CONTACT, HAVE YOU WISHED YOU WERE DEAD OR WISHED YOU COULD GO TO SLEEP AND NOT WAKE UP?: YES
SUICIDE, SINCE LAST CONTACT: NO
TOTAL  NUMBER OF INTERRUPTED ATTEMPTS SINCE LAST CONTACT: NO
TOTAL  NUMBER OF ABORTED OR SELF INTERRUPTED ATTEMPTS SINCE LAST CONTACT: NO
6. HAVE YOU EVER DONE ANYTHING, STARTED TO DO ANYTHING, OR PREPARED TO DO ANYTHING TO END YOUR LIFE?: NO

## 2023-05-25 NOTE — ED NOTES
IP MH Referral Acuity Rating Score (RARS)     LMHP complete at referral to IP MH, with DEC; and, daily while awaiting IP MH placement. Call score to PPS.  CRITERIA SCORING   New 72 HH and Involuntary for IP MH (not adolescent) 0/1   Boarding over 24 hours 1/1   Vulnerable adult at least 55+ with multiple co morbidities; or, Patient age 11 or under 0/1   Suicide ideation without relief of precipitating factors 1/1   Current plan for suicide 1/1   Current plan for homicide 0/1   Imminent risk or actual attempt to seriously harm another without relief of factors precipitating the attempt 0/1   Severe dysfunction in daily living (ex: complete neglect for self care, extreme disruption in vegetative function, extreme deterioration in social interactions) 1/1   Recent (last 2 weeks) or current physical aggression in the ED 0/1   Restraints or seclusion episode in ED 0/1   Verbal aggression, agitation, yelling, etc., while in the ED 0/1   Active psychosis with psychomotor agitation or catatonia 0/1   Need for constant or near constant redirection (from leaving, from others, etc).  0/1   Intrusive or disruptive behaviors 0/1   TOTAL Acuity Total Score: 4

## 2023-05-25 NOTE — TELEPHONE ENCOUNTER
[facility] Access Inpatient Bed Call Log 5/25/2023 1:48 AM      Intake has called facilities that have not updated their bed status within the last 12 hours.     Adults:         [facility] is posting 0 beds.      [facility] is posting 0 beds. [phone]      [facility] is posting 0 beds. [phone]     [facility] is posting 0 beds. [phone] - 1:49am Per Sami, they are capped.    [facility] is posting 0 beds. [phone]     [facility] is posting 0 bed. [phone]      [facility] is posting 0 beds. [phone]     [facility] is posting 0 beds. [phone]     [facility] is posting 2 beds. [phone]     [facility] is posting 0 beds. [phone]       [facility] is posting 4 beds. Mixed unit 12+. Low acuity only.  [phone]     [facility] is posting 0 beds. No aggression.  [phone]     [facility] is posting 0 beds. [phone]      [facility] is posting 1 beds. [phone]      [facility] is posting 2 bed. [phone]      [facility] is posting 1 beds. Low acuity. [phone]     [facility] is posting 2 beds. 72 hr hold preferred. [phone] - 1:51am Per Lissett, call back after 7:30am.     [facility] is posting 3 bed.  [phone]        [facility] is posting 6 bed. Voluntary only- no holds/commitments, No Hx of aggression, violence, or assault. No sexual offenders. No 72 hr holds. [phone]     [facility] is posting 5 beds. (Must have the cognitive ability to do programming. No aggressive or violent behavior or recent HX in the last 2 yrs.  must be primary.) [phone]     is posting 2 beds. Low acuity only. Violence and aggression capped. [phone]      [facility] is posting 2 bed. Low acuity, Neg Covid. [phone]     [facility] is posting 2 beds. Covid neg. Vol only. Combined adolescent and adult  unit. No aggressive or violent behavior. No registered sex offenders. [phone]     [facility] posting 0 beds. Negative covid.      [facility] is posting 14 beds. Call for details. [phone]      [facility] is posting 4 beds. [phone]  - 1:53am Per Aylin, they are open to review.      Pt remains on work list pending appropriate bed availability.

## 2023-05-25 NOTE — TELEPHONE ENCOUNTER
S: [facility]  DEC  Dannielle calling at 8:24pm about a 32 year old/Male presenting with SI w/ a plan.         B: Pt arrived via Self . Presenting problem, stressors: Pt is a female to 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 who goes by he/him.  Pt was in triage earlier, then left to his psychiatry appointment and was recommended to come back for his SI.  Psychiatrist thought he needs medication change that needs to be done in the ER.  Pt SI with a plan to overdose on lithium.  He has been researching.      Pt affect in ED: Flat  & depress  Pt Dx: Schizoaffective Disorder  Bipolar type, PtSD  ADHD, and anxiety.   Previous IPMH hx? Yes: 2021 at [facility].  Pt endorses SI with a plan to overdose   Hx of suicide attempt? Yes: 2021  Pt denies SIB  Pt denies HI   Pt denies hallucinations .   Pt RARS Score: 4    Hx of aggression/violence, sexual offenses, legal concerns, Epic care plan? Describe: Hx of aggression from psychosis  Current concerns for aggression this visit? No  Does pt have a history of Civil Commitment? 3 past commitments.  Currentl not on one.   Is Pt their own guardian? Yes    Pt is prescribed medication. Is patient medication compliant? Yes  He has a hx of noncompliance.    Pt endorses OP services: Psychiatrist, therapist and an [facility] worker and a .   CD concerns: None  Hx of CD use.  None since a year a go.   Acute or chronic medical concerns: Diabetes on insulin.   Does Pt present with specific needs, assistive devices, or exclusionary criteria? None      Pt is not ambulatory  Pt is able to perform ADLs independently      A: Pt to be reviewed for [facility] admission. Pt is Voluntary  Preferred placement: [facility].  Prefers [facility].     COVID:Not yet ordered, Intake to request lab   Utox: Ordered, not yet collected   CMP: In process  CBC: In process  HCG: N/A    R: Patient cleared and ready for behavioral bed placement: Yes  Pt placed on [facility] worklist? Yes    COVID needs to be collected.     No approp beds  at this time.

## 2023-05-25 NOTE — TELEPHONE ENCOUNTER
11:12a Intake received call from [facility] ([facility]) informing that there are no changes to pt's RARS score, pt is still at a RARS score of 4.

## 2023-05-25 NOTE — PROGRESS NOTES
Reviewed the notes from pt's recent appointmenst with Regine and PCP. Pt has been working on medication management with Regine for mental health stabilization. Pt was seen by PCP last week and pt discussed BG in the 400s with A1c 9.1 and elevated LFTs. Discussed with Dr Mayers, who stated that she wants pt's physical health and mental health stabilized before surgery to optimize post-surgical healing. Dr Mayers would like to see the patient in clinic again to discuss. Planned to call pt today but observed that he is currently in the [facility] ED with suicidal ideation and anxiety, awaiting inpatient admission once a bed becomes available. Will reevaluate in a few weeks and contact the patient to schedule a return appointment with Dr Mayers at that time.

## 2023-05-25 NOTE — ED NOTES
IP MH Referral Acuity Rating Score (RARS)    LMHP complete at referral to IP MH, with DEC; and, daily while awaiting IP MH placement. Call score to PPS.  CRITERIA SCORING   New 72 HH and Involuntary for IP MH (not adolescent) 0/1   Boarding over 24 hours 0/1   Vulnerable adult at least 55+ with multiple co morbidities; or, Patient age 11 or under 0/1   Suicide ideation without relief of precipitating factors 1/1   Current plan for suicide 1/1   Current plan for homicide 0/1   Imminent risk or actual attempt to seriously harm another without relief of factors precipitating the attempt 0/1   Severe dysfunction in daily living (ex: complete neglect for self care, extreme disruption in vegetative function, extreme deterioration in social interactions) 1/1   Recent (last 2 weeks) or current physical aggression in the ED 0/1   Restraints or seclusion episode in ED 0/1   Verbal aggression, agitation, yelling, etc., while in the ED 0/1   Active psychosis with psychomotor agitation or catatonia 0/1   Need for constant or near constant redirection (from leaving, from others, etc).  0/1   Intrusive or disruptive behaviors 1/1   TOTAL Acuity Total Score: 4     MONIQUE Borjas  [facility] - Triage & Transition Services  Callback: [phone]

## 2023-05-25 NOTE — PROGRESS NOTES
Triage & Transition Services, [facility]    Client Name: Ayana Prasad    Date: May 25, 2023  Service Type:  Group Therapy  Facilitator: Lydia Sanford     Topic:   Calm vs. Chaos    Intervention:    Group process: support, challenge, affirm, psycho-education.     Response:  Patient did not participate in group.      Lydia Sanford
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
## 2023-05-25 NOTE — TELEPHONE ENCOUNTER
Updated Bed Search @ 1230pm  Per chart review, [place]     [facility] has 0 appropriate beds available. Phone: [phone]  [facility] posting 0 available beds. Phone: [phone]  [facility] posting 0 available beds. Phone: [phone]  [facility] posting 0 available beds. Phone: [phone]  [facility] posting 0 available beds. Phone: [phone]  [facility] posting 0 available beds. Phone:[phone]  [facility] posting 0 available beds. Phone: [phone]. Negative covid required.   [facility] posting 0 available beds. Phone: [phone]     Pt remains on work list pending appropriate bed placement.

## 2023-05-25 NOTE — PLAN OF CARE
Ayana Prasad  May 24, 2023  Plan of Care Hand-off Note     Patient Care Path: Inpatient Mental Health    Plan for Care:     It is the recommendation of this clinician that pt admit to IP MH for safety and stabilization. Pt displays the following risk factors that support IP admission: Current plan for suicide, inability to contract for safety, inability to care for self due to exacerbation of current depressive symptoms. Pt is unable to engage in safety planning to mitigate risk level in a non-secure setting. Lower levels of care would not be sufficient in managing the level of risk pt is presenting with. Due to this IP is the least restrictive option of care for pt. Pt should remain in IP until deemed safe to return to the community and engage in OP MH supports. Pt will be able to resume work with established providers upon discharge.    Critical Safety Issues: None    Overview:  This patient is a child/adolescent: No    This patient has additional special visitor precautions: No    Legal Status: Voluntary, but holdable due to current plan of suicide with intent and means to do so    Contacts:   Mother: Negra [phone]   , Domenica: [phone]    Psychiatry Consult:  Adult Psychiatry Consult requested related to medication consultation. Psychiatry IP Consult Order Placed: Yes    Updated RN, Attending Provider and   regarding plan of care.    Rosemary Jon, OMKARSW

## 2023-05-25 NOTE — PROGRESS NOTES
Triage & Transition Services, Extended Care     Ayana Prasad  May 25, 2023    Prakash is followed related to long wait time for admission. Please see initial DEC Crisis Assessment completed for complete assessment information. Medical record is reviewed. While patient is in the ED, care team is working towards Learn and Demonstrate at [employer] Focused on Crisis Stabilization.     Patient was engaged with writer. Reported he is still experiencing suicidal ideation with a plan to overdose. States he currently feels safe in the hospital but will let staff know if this changes. Reports he feels he would act on thoughts if he were to discharge.    Plan:  Inpatient Mental Health:Currently suicidal with plan to overdose.    Plan for Care reviewed with Assigned Medical Provider? Yes. Provider, Dr. Pagan, response: agreeable.    Extended Care will follow and meet with patient/family/care team as able or requested.     Corinne Romitti, [facility], Extended Care   [phone]

## 2023-05-25 NOTE — ED PROVIDER NOTES
"ED Provider Note  [facility]      History     Chief Complaint   Patient presents with     Suicidal     \"More suicidal thoughts than normal\", thoughts of mx methods     Anxiety     Weaning off of lexapro for past week; \"manic\" episode, severe anxiety, not eating     HPI  Ayana Prasad is a 32 year old adult who is female to 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 with previous diagnosis of schizoaffective disorder bipolar type notes that he had a recent medication change with the addition of Lexapro 3 to 4 weeks ago.  Since that time his had manic-like symptoms until the last 72 hours.  He describes now feeling marked increase in depression with acute suicidal ideation and has been googling different possible options for suicide.  Patient has a specific plan of wanting to overdose on lithium and is doing research on lithium toxicity.  Patient has a history of insulin-dependent diabetes as well and states that his sugars have been relatively out of control.    Past Medical History  Past Medical History:   Diagnosis Date     ADHD (attention deficit hyperactivity disorder)      Bipolar 1 disorder      Borderline personality disorder      Cauda equina syndrome      Chronic low back pain      Depression      Diabetes mellitus, type 2 (H) 1/19/2023     GERD (gastroesophageal reflux disease)      h/o TBI (traumatic brain injury)      Hypertension, unspecified type 12/16/2021     Marginal corneal ulcer, left 07/17/2015     Nephrolithiasis      obesity      Polysubstance abuse - methamphetamine, hallucinagen, heroin, marijuana     currently in remission     PONV (postoperative nausea and vomiting)      PTSD (post-traumatic stress disorder)      Past Surgical History:   Procedure Laterality Date     BACK SURGERY  12/24/2016     BACK SURGERY - For Cauda Equina Syndrome  12/24/2016     COLONOSCOPY       COMBINED CYSTOSCOPY, INSERT STENT URETER(S) Left 8/30/2018    Procedure: COMBINED CYSTOSCOPY, INSERT STENT URETER(S);  " Cystoscopy With Left Stent Placement;  Surgeon: Kiran Ulrich MD;  Location: [facility] OR     COMBINED CYSTOSCOPY, RETROGRADES, EXCHANGE STENT URETER(S) Left 10/14/2018    Procedure: COMBINED CYSTOSCOPY, RETROGRADES, EXCHANGE STENT URETER(S);  Cystoscopy and removal of left-sided stent.;  Surgeon: Stiven Olivo MD;  Location:  OR     COMBINED CYSTOSCOPY, RETROGRADES, URETEROSCOPY, INSERT STENT  1/6/2014    Procedure: COMBINED CYSTOSCOPY, RETROGRADES, URETEROSCOPY, INSERT STENT;  Cystyoscopy place left ureteral stent;  Surgeon: Jaun iKmble MD;  Location: [facility] OR     COMBINED CYSTOSCOPY, RETROGRADES, URETEROSCOPY, INSERT STENT Left 10/23/2018    Procedure: Video cystoscopy, left ureteroscopy with stone extraction;  Surgeon: Bull Mast MD;  Location:  OR     CYSTOSCOPY, URETEROSCOPY, COMBINED Right 9/23/2015    Procedure: COMBINED CYSTOSCOPY, URETEROSCOPY;  Surgeon: ROME Jett MD;  Location: [facility] OR     ENT SURGERY       ESOPHAGOSCOPY, GASTROSCOPY, DUODENOSCOPY (EGD), COMBINED  4/8/2013    Procedure: COMBINED ESOPHAGOSCOPY, GASTROSCOPY, DUODENOSCOPY (EGD), BIOPSY SINGLE OR MULTIPLE;  Gastroscopy;  Surgeon: Peter Pickard MD;  Location: [facility] GI     ESOPHAGOSCOPY, GASTROSCOPY, DUODENOSCOPY (EGD), COMBINED Left 10/28/2014    Procedure: COMBINED ESOPHAGOSCOPY, GASTROSCOPY, DUODENOSCOPY (EGD), BIOPSY SINGLE OR MULTIPLE;  Surgeon: Narcisa Ramirez MD;  Location:  OR     ESOPHAGOSCOPY, GASTROSCOPY, DUODENOSCOPY (EGD), COMBINED N/A 12/24/2018    Procedure: COMBINED ESOPHAGOSCOPY, GASTROSCOPY, DUODENOSCOPY (EGD), BIOPSY SINGLE OR MULTIPLE;  Surgeon: Sonu Verduzco MD;  Location: [facility] GI     INJECT EPIDURAL TRANSFORAMINAL LUMBAR / SACRAL EA ADDITIONAL LEVEL Left 3/18/2021    Procedure: Left L5/S1 transforaminal injection for selective L5 nerve root block;  Surgeon: Eliza Pagan MD;  [place] [facility] [place]     LAPAROSCOPIC CHOLECYSTECTOMY  11/20/2014    [facility], Dr. Ramirez      "LASER HOLMIUM LITHOTRIPSY URETER(S), INSERT STENT, COMBINED  4/2/2014    Procedure: COMBINED CYSTOSCOPY, URETEROSCOPY, LASER HOLMIUM LITHOTRIPSY URETER(S), INSERT STENT;  Cystoscopy,Left Ureteral Stent Removal,Left Ureteroscopy with Laser Lithotripsy,Left Ureter Stent Placement;  Surgeon: ROME Jett MD;  Location: [facility] OR     Transurethral stone resection  03/11/2011     ACE/ARB/ARNI NOT PRESCRIBED (INTENTIONAL)  alcohol swab prep pads  Alcohol Swabs PADS  amLODIPine (NORVASC) 10 MG tablet  amphetamine-dextroamphetamine (ADDERALL XR) 20 MG 24 hr capsule  ASPIRIN NOT PRESCRIBED (INTENTIONAL)  aspirin-acetaminophen-caffeine (EXCEDRIN MIGRAINE) 250-250-65 MG tablet  benzoyl peroxide 5 % external liquid  blood glucose (NO BRAND SPECIFIED) test strip  blood glucose calibration (NO BRAND SPECIFIED) solution  blood glucose monitoring (NO BRAND SPECIFIED) meter device kit  clonazePAM (KLONOPIN) 1 MG tablet  cloNIDine (CATAPRES) 0.1 MG tablet  Continuous Blood Gluc  (DEXCOM G6 ) ZEINAB  Continuous Blood Gluc Sensor (DEXCOM G6 SENSOR) MISC  Continuous Blood Gluc Sensor (FREESTYLE COLTEN 2 SENSOR) MISC  Continuous Blood Gluc Transmit (DEXCOM G6 TRANSMITTER) MISC  doxycycline monohydrate (MONODOX) 100 MG capsule  gabapentin (NEURONTIN) 600 MG tablet  insulin glargine (LANTUS PEN) 100 UNIT/ML pen  insulin pen needle (32G X 4 MM) 32G X 4 MM miscellaneous  needle, disp, 18G X 1\" MISC  Needle, Disp, 25G X 5/8\" MISC  OLANZapine (ZYPREXA) 20 MG tablet  OLANZapine (ZYPREXA) 5 MG tablet  omeprazole (PRILOSEC) 20 MG DR capsule  ondansetron (ZOFRAN ODT) 4 MG ODT tab  rosuvastatin (CRESTOR) 10 MG tablet  Semaglutide, 1 MG/DOSE, (OZEMPIC) 4 MG/3ML pen  syringe, disposable, 1 ML MISC  testosterone cypionate (DEPOTESTOSTERONE) 200 MG/ML injection  thin (NO BRAND SPECIFIED) lancets  tretinoin (RETIN-A) 0.05 % external cream      Allergies   Allergen Reactions     Haldol [Haloperidol] Other (See Comments)     Makes patient very " angry and anxious     Adhesive Tape Hives     Percocet [Oxycodone-Acetaminophen] Nausea and Vomiting     Prednisone Other (See Comments) and Hives     Suicidal ideation     Risperidone Other (See Comments)     Tramadol Hcl Nausea and Vomiting     Droperidol Anxiety     Seroquel [Quetiapine] Palpitations     Spent 2 weeks in the hospital due to having seroquel, caused palpitations and QT prolongation     Family History  Family History   Problem Relation Age of Onset     Gastrointestinal Disease Father         Crohn's Disease     Cancer Father         Liver Cancer     Other Cancer Father         Liver     Obesity Father      Cancer Maternal Grandmother         lympoma     Diabetes Maternal Grandmother      Mental Illness Maternal Grandmother      Other Cancer Maternal Grandmother         Non Hodgkins Lymphoma     Diabetes Maternal Grandfather      Hypertension Maternal Grandfather      Prostate Cancer Maternal Grandfather      Hyperlipidemia Maternal Grandfather      Heart Disease Paternal Grandfather         heart disease     Hypertension Brother      Other Cancer Brother         Esophagecial     Diabetes Brother      Obesity Brother      Hyperlipidemia Mother      Mental Illness Mother      Anxiety Disorder Mother      Anesthesia Reaction Mother         Vomiting/Nausea     Other Cancer Mother      Hypertension Brother      Other Cancer Brother      Other Cancer Paternal Half-Brother      No Known Problems Sister      Social History   Social History     Tobacco Use     Smoking status: Every Day     Packs/day: 0.50     Years: 5.00     Pack years: 2.50     Types: Other, Cigarettes     Start date: 2011     Last attempt to quit: 2022     Years since quittin.6     Passive exposure: Never     Smokeless tobacco: Former     Types: Chew     Quit date: 2019     Tobacco comments:     Just nicotine gum currently. 23   Vaping Use     Vaping status: Former     Substances: Nicotine, Flavoring     Devices:  Certeon   Substance Use Topics     Alcohol use: No     Drug use: Not Currently     Types: Marijuana, Opiates     Comment: oxy, heroin (smoke)         A medically appropriate review of systems was performed with pertinent positives and negatives noted in the HPI, and all other systems negative.    Physical Exam   BP: 139/84  Pulse: 107  Temp: 98.2  F (36.8  C)  Resp: 16  SpO2: 96 %  Physical Exam  Constitutional:       General: He is not in acute distress.     Appearance: Normal appearance. He is not diaphoretic.   HENT:      Head: Atraumatic.      Mouth/Throat:      Mouth: Mucous membranes are moist.   Eyes:      General: No scleral icterus.     Conjunctiva/sclera: Conjunctivae normal.   Cardiovascular:      Rate and Rhythm: Normal rate.      Heart sounds: Normal heart sounds.   Pulmonary:      Effort: No respiratory distress.      Breath sounds: Normal breath sounds.   Abdominal:      General: Abdomen is flat.   Musculoskeletal:      Cervical back: Neck supple.   Skin:     General: Skin is warm.      Findings: No rash.   Neurological:      General: No focal deficit present.      Mental Status: He is alert and oriented to person, place, and time.      Sensory: No sensory deficit.      Motor: No weakness.      Coordination: Coordination normal.   Psychiatric:         Mood and Affect: Mood is anxious.         Behavior: Behavior is cooperative.         Thought Content: Thought content includes suicidal ideation. Thought content includes suicidal plan.           ED Course, Procedures, & Data      Procedures         Suicide assessment completed by mental health (D.E.C., LCSW, etc.)           Results for orders placed or performed during the hospital encounter of 05/24/23   Glucose by meter     Status: Abnormal   Result Value Ref Range    GLUCOSE BY METER POCT 139 (H) 70 - 99 mg/dL   Comprehensive metabolic panel     Status: Abnormal   Result Value Ref Range    Sodium 139 136 - 145 mmol/L    Potassium 3.7 3.4 -  5.3 mmol/L    Chloride 100 98 - 107 mmol/L    Carbon Dioxide (CO2) 24 22 - 29 mmol/L    Anion Gap 15 7 - 15 mmol/L    Urea Nitrogen 17.4 6.0 - 20.0 mg/dL    Creatinine 0.75 0.51 - 1.17 mg/dL    Calcium 10.8 (H) 8.6 - 10.0 mg/dL    Glucose 108 (H) 70 - 99 mg/dL    Alkaline Phosphatase 92 35 - 129 U/L    AST       (H) 10 - 50 U/L    Protein Total 8.1 6.4 - 8.3 g/dL    Albumin 4.7 3.5 - 5.2 g/dL    Bilirubin Total 0.2 <=1.2 mg/dL    GFR Estimate >90 >60 mL/min/1.73m2    Narrative    The sex of this patient cannot be reliably determined based on discrepancies in demographics (legal sex, sex assigned at birth, gender identity).  Both male and female reference ranges are provided where applicable.  Careful evaluation of the patient's results as compared to the gender specific reference intervals is required in this setting.    Lipase     Status: Abnormal   Result Value Ref Range    Lipase 76 (H) 13 - 60 U/L   Glucose by meter     Status: Abnormal   Result Value Ref Range    GLUCOSE BY METER POCT 165 (H) 70 - 99 mg/dL   CBC with platelets and differential     Status: Abnormal   Result Value Ref Range    WBC Count 11.4 (H) 4.0 - 11.0 10e3/uL    RBC Count 5.44 3.80 - 5.90 10e6/uL    Hemoglobin 15.2 11.7 - 17.7 g/dL    Hematocrit 45.8 35.0 - 53.0 %    MCV 84 78 - 100 fL    MCH 27.9 26.5 - 33.0 pg    MCHC 33.2 31.5 - 36.5 g/dL    RDW 14.2 10.0 - 15.0 %    Platelet Count      % Neutrophils 61 %    % Lymphocytes 30 %    % Monocytes 6 %    % Eosinophils 2 %    % Basophils 0 %    % Immature Granulocytes 1 %    NRBCs per 100 WBC 0 <1 /100    Absolute Neutrophils 6.8 1.6 - 8.3 10e3/uL    Absolute Lymphocytes 3.4 0.8 - 5.3 10e3/uL    Absolute Monocytes 0.7 0.0 - 1.3 10e3/uL    Absolute Eosinophils 0.2 0.0 - 0.7 10e3/uL    Absolute Basophils 0.1 0.0 - 0.2 10e3/uL    Absolute Immature Granulocytes 0.1 <=0.4 10e3/uL    Absolute NRBCs 0.0 10e3/uL    Narrative    The sex of this patient cannot be reliably determined based on  discrepancies in demographics (legal sex, sex assigned at birth, gender identity).  Both male and female reference ranges are provided where applicable.  Careful evaluation of the patient s results as compared to the gender specific reference intervals is required in this setting.    Glucose by meter     Status: Abnormal   Result Value Ref Range    GLUCOSE BY METER POCT 129 (H) 70 - 99 mg/dL   RBC and Platelet Morphology     Status: Abnormal   Result Value Ref Range    Platelet Assessment Platelets Clumped (A) Automated Count Confirmed. Platelet morphology is normal.    RBC Morphology Confirmed RBC Indices    CBC with platelets differential     Status: Abnormal    Narrative    The following orders were created for panel order CBC with platelets differential.  Procedure                               Abnormality         Status                     ---------                               -----------         ------                     CBC with platelets and d...[[id number]]  Abnormal            Final result               RBC and Platelet Morphology[[id number]]  Abnormal            Final result                 Please view results for these tests on the individual orders.     Medications   amLODIPine (NORVASC) tablet 10 mg (has no administration in time range)   amphetamine-dextroamphetamine (ADDERALL XR) ER cap 20 mg (has no administration in time range)   cloNIDine (CATAPRES) tablet 0.1 mg (has no administration in time range)   gabapentin (NEURONTIN) tablet 1,200 mg (1,200 mg Oral $Given 5/24/23 2059)   doxycycline hyclate (VIBRAMYCIN) capsule 100 mg (100 mg Oral $Given 5/24/23 2130)   insulin glargine (LANTUS PEN) injection 15 Units (15 Units Subcutaneous $Given 5/24/23 2127)   OLANZapine (zyPREXA) tablet 20 mg (20 mg Oral $Given 5/24/23 2100)   OLANZapine (zyPREXA) tablet 5 mg (5 mg Oral $Given 5/24/23 2100)   omeprazole (priLOSEC) CR capsule 20 mg (has no administration in time range)   rosuvastatin (CRESTOR) tablet 10  mg (has no administration in time range)   glucose gel 15-30 g (has no administration in time range)     Or   dextrose 50 % injection 25-50 mL (has no administration in time range)     Or   glucagon injection 1 mg (has no administration in time range)   nicotine polacrilex (NICORETTE) gum 4 mg (4 mg Buccal $Given 5/24/23 2059)     Labs Ordered and Resulted from Time of ED Arrival to Time of ED Departure   GLUCOSE BY METER - Abnormal       Result Value    GLUCOSE BY METER POCT 139 (*)    COMPREHENSIVE METABOLIC PANEL - Abnormal    Sodium 139      Potassium 3.7      Chloride 100      Carbon Dioxide (CO2) 24      Anion Gap 15      Urea Nitrogen 17.4      Creatinine 0.75      Calcium 10.8 (*)     Glucose 108 (*)     Alkaline Phosphatase 92      AST         (*)     Protein Total 8.1      Albumin 4.7      Bilirubin Total 0.2      GFR Estimate >90     LIPASE - Abnormal    Lipase 76 (*)    GLUCOSE BY METER - Abnormal    GLUCOSE BY METER POCT 165 (*)    CBC WITH PLATELETS AND DIFFERENTIAL - Abnormal    WBC Count 11.4 (*)     RBC Count 5.44      Hemoglobin 15.2      Hematocrit 45.8      MCV 84      MCH 27.9      MCHC 33.2      RDW 14.2      Platelet Count        % Neutrophils 61      % Lymphocytes 30      % Monocytes 6      % Eosinophils 2      % Basophils 0      % Immature Granulocytes 1      NRBCs per 100 WBC 0      Absolute Neutrophils 6.8      Absolute Lymphocytes 3.4      Absolute Monocytes 0.7      Absolute Eosinophils 0.2      Absolute Basophils 0.1      Absolute Immature Granulocytes 0.1      Absolute NRBCs 0.0     GLUCOSE BY METER - Abnormal    GLUCOSE BY METER POCT 129 (*)    RBC AND PLATELET MORPHOLOGY - Abnormal    Platelet Assessment Platelets Clumped (*)     RBC Morphology Confirmed RBC Indices     GLUCOSE MONITOR NURSING POCT   ROUTINE UA WITH MICROSCOPIC REFLEX TO CULTURE   GLUCOSE MONITOR NURSING POCT     No orders to display          Critical care was not performed.     Medical Decision Making  The  patient's presentation was of high complexity (a chronic illness severe exacerbation, progression, or side effect of treatment).    The patient's evaluation involved:  ordering and/or review of 3+ test(s) in this encounter (see separate area of note for details)  discussion of management or test interpretation with another health professional (Patient was discussed face-to-face with independent provider DEC  with reference to need for inpatient hospitalization versus outpatient management this time we agreed that inpatient hospitalization is indicated.)    The patient's management necessitated high risk (a decision regarding hospitalization).      Assessment & Plan        I have reviewed the nursing notes. I have reviewed the findings, diagnosis, plan and need for follow up with the patient.      Patient with history of schizoaffective bipolar type now with marked increase in depression with severe suicidal ideation will require inpatient hospitalization patient is a voluntary admission at this time and will be awaiting bed placement here in the emergency room.    Final diagnoses:   Schizoaffective disorder, bipolar type (H)   Moderate episode of recurrent major depressive disorder (H)   Suicidal ideation       Hector Mendoza  [facility] EMERGENCY DEPARTMENT  5/24/2023     Hector Mendoza MD  05/24/23 2041       Hector Mendoza MD  05/24/23 0362

## 2023-05-25 NOTE — ED NOTES
Writer giving pt's Insulin, during administration needle self retracted. Writer noticed 5 units of insulin had not been administered. MD updated. No new orders. Will continue to monitor sugar and encourage food.

## 2023-05-25 NOTE — ED NOTES
Patient transferred to [facility] at 1017. Patient is cooperative, but anxious. Patient reports SI with plan to overdose. Patient reports no pain at this time. Patient reports anxiety 8/10 and depression 10/10. Patient stated they came in due to their psychiatrist telling them to while they had their meds changed. Patient has been oriented to the unit and has no concerns at this time. Will continue to monitor for safety at this time.

## 2023-05-26 LAB — GLUCOSE BLDC GLUCOMTR-MCNC: 180 MG/DL (ref 70–99)

## 2023-05-26 PROCEDURE — 250N000013 HC RX MED GY IP 250 OP 250 PS 637: Performed by: FAMILY MEDICINE

## 2023-05-26 PROCEDURE — 82962 GLUCOSE BLOOD TEST: CPT

## 2023-05-26 PROCEDURE — 250N000013 HC RX MED GY IP 250 OP 250 PS 637: Performed by: PSYCHIATRY & NEUROLOGY

## 2023-05-26 PROCEDURE — 99285 EMERGENCY DEPT VISIT HI MDM: CPT | Performed by: PSYCHIATRY & NEUROLOGY

## 2023-05-26 RX ORDER — CLONAZEPAM 1 MG/1
1 TABLET ORAL 2 TIMES DAILY
Status: DISCONTINUED | OUTPATIENT
Start: 2023-05-26 | End: 2023-06-01

## 2023-05-26 RX ORDER — NICOTINE 21 MG/24HR
1 PATCH, TRANSDERMAL 24 HOURS TRANSDERMAL DAILY
Status: DISCONTINUED | OUTPATIENT
Start: 2023-05-26 | End: 2023-06-09 | Stop reason: HOSPADM

## 2023-05-26 RX ORDER — IBUPROFEN 600 MG/1
600 TABLET, FILM COATED ORAL ONCE
Status: COMPLETED | OUTPATIENT
Start: 2023-05-26 | End: 2023-05-26

## 2023-05-26 RX ORDER — LAMOTRIGINE 25 MG/1
25 TABLET ORAL 2 TIMES DAILY
Status: DISCONTINUED | OUTPATIENT
Start: 2023-05-26 | End: 2023-05-27

## 2023-05-26 RX ORDER — ACETAMINOPHEN 325 MG/1
650 TABLET ORAL EVERY 4 HOURS PRN
Status: DISCONTINUED | OUTPATIENT
Start: 2023-05-26 | End: 2023-05-27

## 2023-05-26 RX ORDER — PRAZOSIN HYDROCHLORIDE 1 MG/1
1 CAPSULE ORAL AT BEDTIME
Status: DISCONTINUED | OUTPATIENT
Start: 2023-05-26 | End: 2023-06-09 | Stop reason: HOSPADM

## 2023-05-26 RX ORDER — OLANZAPINE 15 MG/1
30 TABLET ORAL AT BEDTIME
Status: DISCONTINUED | OUTPATIENT
Start: 2023-05-26 | End: 2023-05-30

## 2023-05-26 RX ADMIN — OMEPRAZOLE 20 MG: 20 CAPSULE, DELAYED RELEASE ORAL at 09:14

## 2023-05-26 RX ADMIN — ACETAMINOPHEN 650 MG: 325 TABLET ORAL at 15:36

## 2023-05-26 RX ADMIN — IBUPROFEN 600 MG: 600 TABLET ORAL at 16:42

## 2023-05-26 RX ADMIN — LAMOTRIGINE 25 MG: 25 TABLET ORAL at 21:09

## 2023-05-26 RX ADMIN — NICOTINE POLACRILEX 4 MG: 4 GUM, CHEWING BUCCAL at 09:21

## 2023-05-26 RX ADMIN — CLONAZEPAM 1 MG: 1 TABLET ORAL at 21:09

## 2023-05-26 RX ADMIN — GABAPENTIN 1200 MG: 600 TABLET, FILM COATED ORAL at 09:20

## 2023-05-26 RX ADMIN — NICOTINE POLACRILEX 4 MG: 4 GUM, CHEWING BUCCAL at 18:42

## 2023-05-26 RX ADMIN — DOXYCYCLINE HYCLATE 100 MG: 50 CAPSULE ORAL at 21:10

## 2023-05-26 RX ADMIN — GABAPENTIN 1200 MG: 600 TABLET, FILM COATED ORAL at 13:00

## 2023-05-26 RX ADMIN — NICOTINE POLACRILEX 4 MG: 4 GUM, CHEWING BUCCAL at 16:42

## 2023-05-26 RX ADMIN — OLANZAPINE 30 MG: 15 TABLET, FILM COATED ORAL at 22:46

## 2023-05-26 RX ADMIN — NICOTINE POLACRILEX 4 MG: 4 GUM, CHEWING BUCCAL at 21:31

## 2023-05-26 RX ADMIN — NICOTINE POLACRILEX 4 MG: 4 GUM, CHEWING BUCCAL at 07:27

## 2023-05-26 RX ADMIN — NICOTINE POLACRILEX 4 MG: 4 GUM, CHEWING BUCCAL at 12:27

## 2023-05-26 RX ADMIN — DEXTROAMPHETAMINE SULFATE, DEXTROAMPHETAMINE SACCHARATE, AMPHETAMINE SULFATE AND AMPHETAMINE ASPARTATE 20 MG: 1.25; 1.25; 1.25; 1.25 CAPSULE, EXTENDED RELEASE ORAL at 09:13

## 2023-05-26 RX ADMIN — DOXYCYCLINE HYCLATE 100 MG: 50 CAPSULE ORAL at 09:13

## 2023-05-26 RX ADMIN — INSULIN GLARGINE 15 UNITS: 100 INJECTION, SOLUTION SUBCUTANEOUS at 09:13

## 2023-05-26 RX ADMIN — ROSUVASTATIN CALCIUM 10 MG: 10 TABLET, FILM COATED ORAL at 09:22

## 2023-05-26 RX ADMIN — PRAZOSIN HYDROCHLORIDE 1 MG: 1 CAPSULE ORAL at 22:48

## 2023-05-26 RX ADMIN — NICOTINE 1 PATCH: 21 PATCH, EXTENDED RELEASE TRANSDERMAL at 14:59

## 2023-05-26 RX ADMIN — GABAPENTIN 1200 MG: 600 TABLET, FILM COATED ORAL at 21:09

## 2023-05-26 RX ADMIN — NICOTINE POLACRILEX 4 MG: 4 GUM, CHEWING BUCCAL at 13:58

## 2023-05-26 RX ADMIN — AMLODIPINE BESYLATE 10 MG: 10 TABLET ORAL at 09:13

## 2023-05-26 RX ADMIN — CLONAZEPAM 1 MG: 1 TABLET ORAL at 12:57

## 2023-05-26 ASSESSMENT — ACTIVITIES OF DAILY LIVING (ADL)
ADLS_ACUITY_SCORE: 37

## 2023-05-26 NOTE — ED PROVIDER NOTES
[facility] ED Mental Health Handoff Note:       Brief HPI:  This is a 32 year old adult signed out to me by Dr. Mendoza.  See initial ED Provider note for full details of the presentation. Interval history is pertinent for ongoing ED boarding while he awaits an inpatient MH placement.    Home meds reviewed and ordered/administered: Yes    Medically stable for inpatient mental health admission: Yes.    Evaluated by mental health: Yes. The recommendation is for inpatient mental health treatment. Bed search in process    Safety concerns: At the time I received sign out, there were no safety concerns.    Hold Status:  Active Orders   N/A       Exam:   Patient Vitals for the past 24 hrs:   BP Temp Temp src Pulse Resp SpO2   05/26/23 0923 (!) 144/96 97.3  F (36.3  C) Oral 100 16 96 %   05/26/23 0913 (!) 144/96 -- -- -- -- --         ED Course:    Medications   amLODIPine (NORVASC) tablet 10 mg (10 mg Oral $Given 5/26/23 0913)   amphetamine-dextroamphetamine (ADDERALL XR) ER cap 20 mg (20 mg Oral $Given 5/26/23 0913)   cloNIDine (CATAPRES) tablet 0.1 mg (0.1 mg Oral $Given 5/25/23 1947)   gabapentin (NEURONTIN) tablet 1,200 mg (1,200 mg Oral $Given 5/26/23 0920)   doxycycline hyclate (VIBRAMYCIN) capsule 100 mg (100 mg Oral $Given 5/26/23 0913)   OLANZapine (zyPREXA) tablet 20 mg (20 mg Oral Not Given 5/25/23 2219)   OLANZapine (zyPREXA) tablet 5 mg (5 mg Oral $Given 5/25/23 2214)   omeprazole (priLOSEC) CR capsule 20 mg (20 mg Oral $Given 5/26/23 0914)   rosuvastatin (CRESTOR) tablet 10 mg (10 mg Oral $Given 5/26/23 0922)   glucose gel 15-30 g (has no administration in time range)     Or   dextrose 50 % injection 25-50 mL (has no administration in time range)     Or   glucagon injection 1 mg (has no administration in time range)   nicotine polacrilex (NICORETTE) gum 4 mg (4 mg Buccal $Given 5/26/23 1227)   insulin glargine (LANTUS PEN) injection 15 Units (15 Units Subcutaneous $Given 5/26/23 7859)   clonazePAM  (klonoPIN) tablet 1 mg (1 mg Oral $Given 5/25/23 2409)   OLANZapine zydis (zyPREXA) ODT tab 5 mg (has no administration in time range)            There were no significant events during my shift.    Impression:    ICD-10-CM    1. Schizoaffective disorder, bipolar type (H)  F25.0       2. Moderate episode of recurrent major depressive disorder (H)  F33.1       3. Suicidal ideation  R45.851           Plan:    1. Awaiting mental health evaluation/recommendations.      RESULTS:   Results for orders placed or performed during the hospital encounter of 05/24/23 (from the past 24 hour(s))   Glucose by meter     Status: Abnormal    Collection Time: 05/25/23  2:19 PM   Result Value Ref Range    GLUCOSE BY METER POCT 202 (H) 70 - 99 mg/dL   Urine Drugs of Abuse Screen     Status: Abnormal    Collection Time: 05/25/23  2:39 PM    Narrative    The following orders were created for panel order Urine Drugs of Abuse Screen.  Procedure                               Abnormality         Status                     ---------                               -----------         ------                     Drug abuse screen 1 urin...[[id number]]  Abnormal            Final result                 Please view results for these tests on the individual orders.   UA with Microscopic reflex to Culture     Status: Abnormal    Collection Time: 05/25/23  2:39 PM    Specimen: Urine, Midstream   Result Value Ref Range    Color Urine Light Yellow Colorless, Straw, Light Yellow, Yellow    Appearance Urine Clear Clear    Glucose Urine 50 (A) Negative mg/dL    Bilirubin Urine Negative Negative    Ketones Urine Negative Negative mg/dL    Specific Gravity Urine 1.022 1.003 - 1.035    Blood Urine Negative Negative    pH Urine 6.5 5.0 - 7.0    Protein Albumin Urine 10 (A) Negative mg/dL    Urobilinogen Urine Normal Normal, 2.0 mg/dL    Nitrite Urine Negative Negative    Leukocyte Esterase Urine Large (A) Negative    Mucus Urine Present (A) None Seen /LPF    RBC  Urine 0 <=2 /HPF    WBC Urine 58 (H) <=5 /HPF    Squamous Epithelials Urine 2 (H) <=1 /HPF    Transitional Epithelials Urine <1 <=1 /HPF    Narrative    Urine Culture ordered based on laboratory criteria   Drug abuse screen 1 urine (ED)     Status: Abnormal    Collection Time: 05/25/23  2:39 PM   Result Value Ref Range    Amphetamines Urine Screen Positive (A) Screen Negative    Barbituates Urine Screen Negative Screen Negative    Benzodiazepine Urine Screen Negative Screen Negative    Cannabinoids Urine Screen Positive (A) Screen Negative    Cocaine Urine Screen Negative Screen Negative    Opiates Urine Screen Negative Screen Negative   Urine Culture     Status: None (Preliminary result)    Collection Time: 05/25/23  2:39 PM    Specimen: Urine, Midstream   Result Value Ref Range    Culture No growth, less than 1 day    Glucose by meter     Status: Abnormal    Collection Time: 05/25/23  7:47 PM   Result Value Ref Range    GLUCOSE BY METER POCT 167 (H) 70 - 99 mg/dL   Glucose by meter     Status: Abnormal    Collection Time: 05/26/23  8:29 AM   Result Value Ref Range    GLUCOSE BY METER POCT 180 (H) 70 - 99 mg/dL             MD Jermaine Reddy, Marco Quintero MD  05/26/23 6135

## 2023-05-26 NOTE — TELEPHONE ENCOUNTER
3:52 PM: Intake paged Provider to review Pt for station [facility] overflow for low acuity.    4:02 PM: Intake presented Pt to Provider. Pt accepted to [facility]. Provider requesting Pt has informed decision, d/t placement being a locked unit and milieu having high acuity pts. and wanting to verify if Pt is holdable at this time.    4:04: Intake spoke with CRN at station 12 to inform that Pt has been accepted. CRN to call back after a transfer of current Pt is completed for placement.    4:24 PM: Pt added to the queue and admit board.    4:30 PM: Intake called [facility] to provide disposition to CRN. CRN not available at this time.    4:40 PM: Intake called [facility] and spoke with CRN to provide disposition. Intake informed CRN of placement concerns. And reiterated that a hold would only be necessary if Pt is declining placement. CRN informed that Pt is still voluntary with continued SI and would place a hold if necessary.

## 2023-05-26 NOTE — PROGRESS NOTES
"Triage & Transition Services, Extended Care     Therapy Progress Note    Patient: Prakash goes by \"Prakash,\" uses he/him pronouns  Date of Service: May 26, 2023  Site of Service: [facility]  Patient was seen in-person.     Presenting problem:   Prakash is followed related to Long wait time for admission: 43+ hours in the ED. Please see initial DEC/[facility] Crisis Assessment completed by Rosemary Jon on 5/24/23 for complete assessment information. Notable concerns include: Suicidal ideation     Individuals Present: Prakash & JULISA Wynn    Session start: 955am   Session end: 10:15am   Session duration in minutes: 20 minutes   Session number: 1  Anticipated number of sessions or this episode of care: 1-3  CPT utilized: 86022 - Psychotherapy (with patient) - 30 (16-37*) min    Current Presentation:   Patient reports slightly less anxiety compared to yesterday. Patient contributes this to adjusting to his surrounding. Patient endorsed suicidal  Ideation with plan of overdose with an intensity rating of 9 out of 10. Patient denies homicidal ideation. Patient endorsed auditory hallucinations describing it as one intrusive negative voice often telling him to hurt himself. Patient reports a recent increase in panic attacks and feeling unable to control anxiety. Patient actively engaged with Writer on practicing grounding techniques.      Mental Status Exam:   Appearance: awake, alert, adequately groomed and mild distress  Attitude: cooperative  Eye Contact: good  Mood: anxious  Affect: intensity is blunted  Speech: clear, coherent  Psychomotor Behavior: fidgeting  Thought Process:  logical  Associations: no loose associations  Thought Content: plan for suicide present  Insight: good  Judgement: intact  Oriented to: time, person, and place  Attention Span and Concentration: intact  Recent and Remote Memory: intact    Diagnosis:   295.70  (F25) Schizoaffective Disorder Bipolar Type   309.81 (F43.10) Posttraumatic Stress " Disorder (includes Posttraumatic Stress Disorder for Children 6 Years and Younger)  With dissociative symptoms - by history   300.02 (F41.1) Generalized Anxiety Disorder - by history     Therapeutic Intervention(s):   Provided active listening, unconditional positive regard, and validation.     Treatment Objective(s) Addressed:   The focus of this session was on rapport building and assessing safety        Case Management:   Patient [phone]    General Recommendations:   Continue to monitor for harm. Consider: Use clear and concise directions, too many words can be overwhelming, Listen in a neutral, non-judgmental way. Offer reassurance and Be mindful of your nonverbal cues (body language, facial expressions)    Plan:   Inpatient Mental Health: safety and stabilization  Patient endorsed active suicidal ideation with a plan. Patient reports a significant increase in an anxiety and panic attack symptoms over the past week. Patient is unable to safety plan to mitigate risk level at this time.       Plan for Care reviewed with Assigned Medical Provider? Yes. Provider, Dr. Dean, response: acknowledged     Jesenia Colby [facility]   Licensed Mental Health Professional (LMHP), [facility]  [phone]

## 2023-05-26 NOTE — TELEPHONE ENCOUNTER
[facility] Access Inpatient Bed Call Log 5/26/2023 1:19 AM      Intake has called facilities that have not updated their bed status within the last 12 hours.     Adults:         [facility] is posting 0 beds.      [facility] is posting 0 beds. [phone]      [facility] is posting 0 beds. [phone]     [facility] is posting 0 beds. [phone]     [facility] is posting 0 beds. [phone]     [facility] is posting 0 bed. [phone]      [facility] is posting 0 beds. [phone]     [facility] is posting 0 beds. [phone]     [facility] is posting 2 beds. [phone]     [facility] is posting 0 beds. [phone]       [facility] is posting 2 beds. Mixed unit 12+. Low acuity only.  [phone]     [facility] is posting 0 beds. No aggression.  [phone]     [facility] is posting 0 beds. [phone]      [facility] is posting 1 beds. [phone]      [facility] is posting 3 bed. [phone]      [facility] is posting 0 beds. Low acuity. [phone]     [facility] is posting 2 beds. 72 hr hold preferred. [phone]     [facility] is posting 3 bed.  [phone]        [facility] is posting 4 bed. Voluntary only- no holds/commitments, No Hx of aggression, violence, or assault. No sexual offenders. No 72 hr holds. [phone]     [facility] is posting 6 beds. (Must have the cognitive ability to do programming. No aggressive or violent behavior or recent HX in the last 2 yrs. MH must be primary.) [phone]    [facility] is posting 0 beds. Low acuity only. Violence and aggression capped. [phone]      [facility] is posting 0 bed. Low acuity, Neg Covid. [phone]     [facility] is posting 2 beds. Covid neg. Vol only. Combined adolescent and adult unit. No aggressive or violent behavior. No registered sex offenders. [phone]     [facility] posting 3  beds. [place] covid.      [facility] is posting 14 beds. Call for details. [phone]      [facility] is posting 4 beds. [phone]       Pt remains on work list pending appropriate bed availability.

## 2023-05-26 NOTE — PROGRESS NOTES
Triage & Transition Services, Extended Care    Client Name: Ayana Prasad    Date: May 26, 2023  Service Type:  Group Therapy  Session Start Time:  5:45P    Session End Time: 5:55P  Session Length: 10min  Site Location: [facility], [place]  Attendees: Patient and other group members  Facilitator: Lydia Sanford     Topic:   What to do on a Tough Day    Intervention:    Group process: support, challenge, affirm, psycho-education.     Response:  Patient did participate in group. Behavior in group was appropriate. Patient shared his coping skills and distraction activities.       Lydia Sanford

## 2023-05-26 NOTE — CONSULTS
PSYCHIATRIC CONSULTATION    Requesting Physician: Marco Dean MD    Admission Date: 05/24/2023  Date of Service: 05/26/2023    The patient was seen, his chart reviewed, report to follow    Dx: Schizoaffective Disorder, bipolar type, currently depressed         PTSD, unresolved         AVA         ADHD         Polysubstance Abuse by history, currently in remission.    Plan: The patient has to be admitted to Inpatient Psychiatry for further evaluation and medication adjustment. I will increase Zyprexa to 30 mg at bedtime and utilize PRN Zyprexa as ordered. I will increase Klonopin to 1 mg BID and start him on Lamictal 25 mg BID. Prazosin 1 mg at bedtime will also be started.    Thanks,    Ismael Greenberg MD

## 2023-05-26 NOTE — ED NOTES
"Patient is A&O to self and place. Patient has been med seeking. Patient  said at the beginning of the shift that she was having an anxiety attack and was given all their meds before the schedule time. Patient said \" hydroxyzine and all that other stuff wont work for me, Xanax works for me\". Provider said he wanted patient to get a psych consult the next day before he prescribe any extra dose of BZO. Patient then requested to leave. But after speaking to an , it was determine that patient couldn't leave. Patient was okay with that. Provider ordered and extra dose of Zyprexa 5mg BID PRN for the patient.  Patient then was able to fall asleep. Patient has been sleeping well all night. No physical complaints made, no acute distress noted. We will continue to  monitor patient and provide safe environment.   "

## 2023-05-26 NOTE — ED NOTES
"[facility] Crisis Reassessment      Ayana \"Randall\" Shanice was reassessed at the request of pt for the following reasons: seeking discharge home rather than continued wait for admission. Pt was first seen on 5/24/2023 by MONIQUE Borjas; see the initial assessment note for details.      Patient Presentation    Initial ED presentation details: Pt present yesterday following increasing episode of depression which resulted in intrusive suicidal thoughts and subsequent research on how to overdose on Lithium.  Pt resides in a group home, has outpatient providers in place, is medication compliant, history of past suicide attempts and three past commitments.  Pt did not feel safe to discharge yesterday, unable to engage in safety planning, agreeable to voluntary inpatient admission.    Current patient presentation: Pt is notably anxious, reports being \"panicked\" with no specific trigger, reports he generally experiences episodes of panic, has one PRN that he can take in the day of Klonopin (already took today's dose at about 2 PM).  He reports no change in his intrusive suicidal thoughts or plans regarding overdosing, but \"I just feel panicked here or there and don't know what to do\".  Pt understands that he would not be discharged with continued intrusive suicidal thoughts and plan to overdose.  He just requested and received his HS medications which may help reduce the distress, but agreeable to take an additional PRN to target current symptoms, will continue to await voluntary inpatient admission.  Pt signed ROIs for his outpatient medication provider and therapist.    Changes observed since initial assessment: Episodes of panic since arrival, no change in suicidal ideation or intent.      Risk of Harm    [place] Suicide Severity Rating Scale Full Clinical Version: 5/24/2023  Suicidal Ideation  1. Wish to be Dead (Lifetime): Yes  1. Wish to be Dead (Past 1 Month): Yes  2. Non-Specific Active Suicidal Thoughts " (Lifetime): Yes  2. Non-Specific Active Suicidal Thoughts (Past 1 Month): Yes  3. Active Suicidal Ideation with any Methods (Not Plan) Without Intent to Act (Lifetime): Yes  3. Active Suicidal Ideation with any Methods (Not Plan) Without Intent to Act (Past 1 Month): Yes  4. Active Suicidal Ideation with Some Intent to Act, Without Specific Plan (Lifetime): Yes  4. Active Suicidal Ideation with Some Intent to Act, Without Specific Plan (Past 1 Month): Yes  5. Active Suicidal Ideation with Specific Plan and Intent (Lifetime): Yes  5. Active Suicidal Ideation with Specific Plan and Intent (Past 1 Month): Yes  Active Suicidal Ideation with Specific Plan and Intent Description (Past 1 Month): Overdose on psych meds  Intensity of Ideation  Most Severe Ideation Rating (Lifetime): 5  Most Severe Ideation Rating (Past 1 Month): 5  Frequency (Lifetime): Less than once a week  Frequency (Past 1 Month): Daily or almost daily  Duration (Lifetime): Fleeting, few seconds or minutes  Duration (Past 1 Month): More than 8 hours/persistent or continuous  Controllability (Lifetime): Can control thoughts with some difficulty  Controllability (Past 1 Month): Can control thoughts with a lot of difficulty  Deterrents (Lifetime): Uncertain that deterrents stopped you  Deterrents (Past 1 Month): Deterrents definitely did not stop you  Reasons for Ideation (Lifetime): Equally to get attention, revenge, or a reaction from others and to end/stop the pain  Reasons for Ideation (Past 1 Month): Completely to end or stop the pain (You couldn't go on living with the pain or how you were feeling)  Suicidal Behavior  Actual Attempt (Lifetime): Yes  Total Number of Actual Attempts (Lifetime): 3  Actual Attempt (Past 3 Months): No  Has subject engaged in non-suicidal self-injurious behavior? (Lifetime): No  Interrupted Attempts (Lifetime): No  Aborted or Self-Interrupted Attempt (Lifetime): No  Preparatory Acts or Behavior (Lifetime): Yes  Preparatory  Acts or Behavior (Past 3 Months): Yes  Preparatory Acts or Behavior Description (Past 3 Months): Researching how to get Lithium toxicity from overdosing  C-SSRS Risk (Lifetime/Recent)  Calculated C-SSRS Risk Score (Lifetime/Recent): High Risk    [place] Suicide Severity Rating Scale Since Last Contact: 5/25/2023  Suicidal Ideation (Since Last Contact)  1. Wish to be Dead (Since Last Contact): Yes  Suicidal Behavior (Since Last Contact)  Actual Attempt (Since Last Contact): No  Has subject engaged in non-suicidal self-injurious behavior? (Since Last Contact): No  Interrupted Attempts (Since Last Contact): No  Aborted or Self-Interrupted Attempt (Since Last Contact): No  Preparatory Acts or Behavior (Since Last Contact): No  Suicide (Since Last Contact): No  Actual/Potential Lethality (Most Lethal Attempt)  Actual Lethality/Medical Damage Code (Most Lethal Attempt): Minor physical damage  Potential Lethality Code (Most Lethal Attempt): Behavior not likely to result in injury  C-SSRS Risk (Since Last Contact)  Calculated C-SSRS Risk Score (Since Last Contact): Low Risk    Validity of evaluation is not impacted by presenting factors during interview .   Comments regarding subjective versus objective responses to [place] tool: tool does not capture continued plan and intent  Environmental or Psychosocial Events: loss of a loved one, threats to a prized relationship, challenging interpersonal relationships, geographic isolation from supports, helplessness/hopelessness and neither working nor attending school  Chronic Risk Factors: history of suicide attempts (4), history of psychiatric hospitalization, history of abuse or neglect, chronic health problems, LGBTQ+ orientation , serious, persistent mental illness and personality disorders   Warning Signs: seeking access to means to hurt or kill self, talking or writing about death, dying, or suicide, hopelessness, acting reckless or engaging in risky activities, feeling  trapped, like there is no way out, anxiety, agitation, unable to sleep, sleeping all the time and no reason for living, no sense of purpose in life  Protective Factors: strong bond to family unit, community support, or employment, lives in a responsibly safe and stable environment, good treatment engagement, supportive ongoing medical and mental health care relationships and help seeking  Interpretation of Risk Scoring, Risk Mitigation Interventions and Safety Plan:  high        Does the patient have thoughts of harming others? No    Mental Status Exam   Affect: Constricted   Appearance: Appropriate    Attention Span/Concentration: Attentive?    Eye Contact: Engaged   Fund of Knowledge: Appropriate    Language /Speech Content: Fluent   Language /Speech Volume: Normal    Language /Speech Rate/Productions: Articulate    Recent Memory: Intact   Remote Memory: Intact   Mood: Anxious and Depressed    Orientation to Person: Yes    Orientation to Place: Yes   Orientation to Time of Day: Yes    Orientation to Date: Yes    Situation (Do they understand why they are here?): Yes    Psychomotor Behavior: Other: restless    Thought Content: Clear   Thought Form: Intact       Additional Collateral Information     None at this time      Therapeutic Intervention  The following therapeutic methodologies were employed when working with the patient: Establishing rapport, Active listening, Assess dimensions of crisis, Apply solution-focused therapy to address current crisis, Identify additional supports and alternative coping skills, Establish a discharge plan, Motivational Interviewing, Brief Supportive Therapy, Trauma-Informed Care and Safety planning..    Diagnosis:     295.70  (F25) Schizoaffective Disorder Bipolar Type   309.81 (F43.10) Posttraumatic Stress Disorder (includes Posttraumatic Stress Disorder for Children 6 Years and Younger)  With dissociative symptoms - by history   300.02 (F41.1) Generalized Anxiety Disorder - by  history     Clinical Substantiation of Recommendations    Pt continues to endorse suicidal behavior, risk elevated, recommendation for voluntary inpatient admission for acute stabilization remains.    Plan:    Disposition  Recommended disposition: Inpatient Mental Health      Reviewed case and recommendations with attending provider. Attending Name: Dr. Dean      Attending concurs with disposition: Yes      Patient and/or verified legal guardian concurs with disposition: Yes      Final disposition: Inpatient mental health .         Assessment Details  Total duration spent on the patient case in minutes: .50 hrs     CPT code(s) utilized: 76586 - Psychotherapy for Crisis (Each additional 30 minutes) - 30 min        DESIRE Mackay, [facility]  Callback: [phone]

## 2023-05-26 NOTE — PROGRESS NOTES
Triage & Transition Services, Extended Care    Client Name: Ayana Prasad    Date: May 26, 2023  Service Type:  Group Therapy  Session Start Time:  2:40P    Session End Time: 2:50P  Session Length: 10min  Site Location: [facility]  Attendees: Patient and other group members  Facilitator: Lydia Sanford     Topic:   Gift Box    Intervention:    Group process: support, challenge, affirm, psycho-education.     Response:  Patient did participate in group. Behavior in group was appropriate. Patient shared his gift of self-compassion and resilience. Patient shared long term goals to achieve completing a master's program.       Lydia Sanford

## 2023-05-26 NOTE — PROGRESS NOTES
Triage & Transition Services, Extended Care    Client Name: Ayana Prasad    Date: May 26, 2023  Service Type:  Group Therapy  Session Start Time:  1130am     Session End Time: 12:00pm   Session Length: 30 minutes  Site Location: [facility]  Attendees: Patient and other group members  Facilitator: JULISA Wynn     Topic:   Anxiety    Intervention:    Group process: support, challenge, affirm, psycho-education.     Response:  Patient did participate in group. Behavior in group was engaged and appropriate. Patient shared about how they experience anxiety and practiced deep breathing       JULISA Wynn

## 2023-05-26 NOTE — ED NOTES
"1940 pt came to nurses desk and stated she is about to have a panic attack. Requested Clonidine and all her other meds early. . Pt encouraged to use her breathing techniques. Meds given and report given to oncoming nurse. Pt came to nurse and stated she \"cannot stay here and has to go home.\" Spoke w/ Dr. De La Cruz and he ordered a reassessment. Awaiting reassessment.   "

## 2023-05-26 NOTE — ED NOTES
Patient is admitting thought of suicide with plan to overdose on lithium. Patient reports that he does not want to hurt anyone else. Patient denies AVH. Patient states his depression is high and his anxiety is as well. Patient reports that while her they feel safe. Patient has taken all medications as prescribed. Patient continue to sit in day area at this time and color calmly. Patient is cooperative with staff. Education was provided on use of insulin Pen on proper use. Will continue to monitor patient at this time for safety.

## 2023-05-27 PROBLEM — R45.851 SUICIDAL IDEATION: Status: ACTIVE | Noted: 2023-05-27

## 2023-05-27 LAB
BACTERIA UR CULT: NORMAL
GLUCOSE BLDC GLUCOMTR-MCNC: 132 MG/DL (ref 70–99)
GLUCOSE BLDC GLUCOMTR-MCNC: 188 MG/DL (ref 70–99)
GLUCOSE BLDC GLUCOMTR-MCNC: 224 MG/DL (ref 70–99)
GLUCOSE BLDC GLUCOMTR-MCNC: 98 MG/DL (ref 70–99)

## 2023-05-27 PROCEDURE — 250N000013 HC RX MED GY IP 250 OP 250 PS 637: Performed by: PSYCHIATRY & NEUROLOGY

## 2023-05-27 PROCEDURE — 250N000013 HC RX MED GY IP 250 OP 250 PS 637: Performed by: FAMILY MEDICINE

## 2023-05-27 PROCEDURE — 93010 ELECTROCARDIOGRAM REPORT: CPT | Performed by: INTERNAL MEDICINE

## 2023-05-27 PROCEDURE — 124N000002 HC R&B MH UMMC

## 2023-05-27 PROCEDURE — 82962 GLUCOSE BLOOD TEST: CPT

## 2023-05-27 PROCEDURE — 99223 1ST HOSP IP/OBS HIGH 75: CPT | Mod: AI | Performed by: PSYCHIATRY & NEUROLOGY

## 2023-05-27 PROCEDURE — 250N000011 HC RX IP 250 OP 636: Performed by: PSYCHIATRY & NEUROLOGY

## 2023-05-27 PROCEDURE — 93005 ELECTROCARDIOGRAM TRACING: CPT

## 2023-05-27 RX ORDER — ACETAMINOPHEN 325 MG/1
650 TABLET ORAL EVERY 4 HOURS PRN
Status: DISCONTINUED | OUTPATIENT
Start: 2023-05-27 | End: 2023-06-09 | Stop reason: HOSPADM

## 2023-05-27 RX ORDER — LAMOTRIGINE 25 MG/1
25 TABLET ORAL DAILY
Status: DISCONTINUED | OUTPATIENT
Start: 2023-05-28 | End: 2023-06-08

## 2023-05-27 RX ORDER — TESTOSTERONE CYPIONATE 200 MG/ML
20 INJECTION, SOLUTION INTRAMUSCULAR WEEKLY
Status: DISCONTINUED | OUTPATIENT
Start: 2023-05-27 | End: 2023-06-09 | Stop reason: HOSPADM

## 2023-05-27 RX ORDER — AMOXICILLIN 250 MG
1 CAPSULE ORAL 2 TIMES DAILY PRN
Status: DISCONTINUED | OUTPATIENT
Start: 2023-05-27 | End: 2023-06-09 | Stop reason: HOSPADM

## 2023-05-27 RX ORDER — OLANZAPINE 10 MG/2ML
10 INJECTION, POWDER, FOR SOLUTION INTRAMUSCULAR DAILY PRN
Status: DISCONTINUED | OUTPATIENT
Start: 2023-05-27 | End: 2023-06-09 | Stop reason: HOSPADM

## 2023-05-27 RX ORDER — IBUPROFEN 600 MG/1
600 TABLET, FILM COATED ORAL EVERY 6 HOURS PRN
Status: DISCONTINUED | OUTPATIENT
Start: 2023-05-27 | End: 2023-06-09 | Stop reason: HOSPADM

## 2023-05-27 RX ORDER — OLANZAPINE 5 MG/1
5-10 TABLET ORAL DAILY PRN
Status: DISCONTINUED | OUTPATIENT
Start: 2023-05-27 | End: 2023-06-09 | Stop reason: HOSPADM

## 2023-05-27 RX ORDER — ONDANSETRON 4 MG/1
4 TABLET, ORALLY DISINTEGRATING ORAL EVERY 8 HOURS PRN
Status: DISCONTINUED | OUTPATIENT
Start: 2023-05-27 | End: 2023-06-09 | Stop reason: HOSPADM

## 2023-05-27 RX ORDER — MAGNESIUM HYDROXIDE/ALUMINUM HYDROXICE/SIMETHICONE 120; 1200; 1200 MG/30ML; MG/30ML; MG/30ML
30 SUSPENSION ORAL EVERY 4 HOURS PRN
Status: DISCONTINUED | OUTPATIENT
Start: 2023-05-27 | End: 2023-06-09 | Stop reason: HOSPADM

## 2023-05-27 RX ORDER — OLANZAPINE 5 MG/1
5-10 TABLET ORAL 3 TIMES DAILY PRN
Status: DISCONTINUED | OUTPATIENT
Start: 2023-05-27 | End: 2023-05-27

## 2023-05-27 RX ORDER — LANOLIN ALCOHOL/MO/W.PET/CERES
3 CREAM (GRAM) TOPICAL
Status: DISCONTINUED | OUTPATIENT
Start: 2023-05-27 | End: 2023-06-02

## 2023-05-27 RX ORDER — TRETINOIN 0.5 MG/G
CREAM TOPICAL AT BEDTIME
Status: DISCONTINUED | OUTPATIENT
Start: 2023-05-27 | End: 2023-06-09 | Stop reason: HOSPADM

## 2023-05-27 RX ORDER — HYDROXYZINE HYDROCHLORIDE 25 MG/1
25 TABLET, FILM COATED ORAL EVERY 4 HOURS PRN
Status: DISCONTINUED | OUTPATIENT
Start: 2023-05-27 | End: 2023-06-09 | Stop reason: HOSPADM

## 2023-05-27 RX ORDER — HYDROXYZINE HYDROCHLORIDE 25 MG/1
25 TABLET, FILM COATED ORAL EVERY 4 HOURS PRN
Status: DISCONTINUED | OUTPATIENT
Start: 2023-05-27 | End: 2023-05-27

## 2023-05-27 RX ORDER — TESTOSTERONE CYPIONATE 200 MG/ML
20 INJECTION, SOLUTION INTRAMUSCULAR WEEKLY
Status: DISCONTINUED | OUTPATIENT
Start: 2023-05-30 | End: 2023-05-27

## 2023-05-27 RX ORDER — OLANZAPINE 10 MG/2ML
10 INJECTION, POWDER, FOR SOLUTION INTRAMUSCULAR 3 TIMES DAILY PRN
Status: DISCONTINUED | OUTPATIENT
Start: 2023-05-27 | End: 2023-05-27

## 2023-05-27 RX ADMIN — OMEPRAZOLE 20 MG: 20 CAPSULE, DELAYED RELEASE ORAL at 09:24

## 2023-05-27 RX ADMIN — NICOTINE POLACRILEX 4 MG: 4 GUM, CHEWING BUCCAL at 12:40

## 2023-05-27 RX ADMIN — DOXYCYCLINE HYCLATE 100 MG: 50 CAPSULE ORAL at 21:36

## 2023-05-27 RX ADMIN — CLONAZEPAM 1 MG: 1 TABLET ORAL at 09:23

## 2023-05-27 RX ADMIN — CLONAZEPAM 1 MG: 1 TABLET ORAL at 21:08

## 2023-05-27 RX ADMIN — GABAPENTIN 1200 MG: 600 TABLET, FILM COATED ORAL at 21:09

## 2023-05-27 RX ADMIN — DOXYCYCLINE HYCLATE 100 MG: 50 CAPSULE ORAL at 09:45

## 2023-05-27 RX ADMIN — DEXTROAMPHETAMINE SULFATE, DEXTROAMPHETAMINE SACCHARATE, AMPHETAMINE SULFATE AND AMPHETAMINE ASPARTATE 20 MG: 1.25; 1.25; 1.25; 1.25 CAPSULE, EXTENDED RELEASE ORAL at 09:23

## 2023-05-27 RX ADMIN — AMLODIPINE BESYLATE 10 MG: 10 TABLET ORAL at 09:23

## 2023-05-27 RX ADMIN — NICOTINE POLACRILEX 4 MG: 4 GUM, CHEWING BUCCAL at 21:40

## 2023-05-27 RX ADMIN — GABAPENTIN 1200 MG: 600 TABLET, FILM COATED ORAL at 09:23

## 2023-05-27 RX ADMIN — ROSUVASTATIN CALCIUM 10 MG: 10 TABLET, FILM COATED ORAL at 09:24

## 2023-05-27 RX ADMIN — NICOTINE POLACRILEX 4 MG: 4 GUM, CHEWING BUCCAL at 09:45

## 2023-05-27 RX ADMIN — OLANZAPINE 30 MG: 15 TABLET, FILM COATED ORAL at 21:09

## 2023-05-27 RX ADMIN — NICOTINE 1 PATCH: 21 PATCH, EXTENDED RELEASE TRANSDERMAL at 09:21

## 2023-05-27 RX ADMIN — NICOTINE POLACRILEX 4 MG: 4 GUM, CHEWING BUCCAL at 09:03

## 2023-05-27 RX ADMIN — CLONIDINE HYDROCHLORIDE 0.1 MG: 0.1 TABLET ORAL at 12:40

## 2023-05-27 RX ADMIN — TESTOSTERONE CYPIONATE 20 MG: 200 INJECTION, SOLUTION INTRAMUSCULAR at 21:11

## 2023-05-27 RX ADMIN — PRAZOSIN HYDROCHLORIDE 1 MG: 1 CAPSULE ORAL at 21:09

## 2023-05-27 RX ADMIN — NICOTINE POLACRILEX 4 MG: 4 GUM, CHEWING BUCCAL at 17:43

## 2023-05-27 RX ADMIN — TRETINOIN: 0.5 CREAM TOPICAL at 21:14

## 2023-05-27 RX ADMIN — LAMOTRIGINE 25 MG: 25 TABLET ORAL at 09:23

## 2023-05-27 RX ADMIN — GABAPENTIN 1200 MG: 600 TABLET, FILM COATED ORAL at 14:34

## 2023-05-27 RX ADMIN — INSULIN GLARGINE 15 UNITS: 100 INJECTION, SOLUTION SUBCUTANEOUS at 09:45

## 2023-05-27 ASSESSMENT — ACTIVITIES OF DAILY LIVING (ADL)
ADLS_ACUITY_SCORE: 37
ADLS_ACUITY_SCORE: 45
ADLS_ACUITY_SCORE: 45
LAUNDRY: WITH SUPERVISION
ADLS_ACUITY_SCORE: 37
HYGIENE/GROOMING: INDEPENDENT
DRESS: INDEPENDENT
ADLS_ACUITY_SCORE: 37
ADLS_ACUITY_SCORE: 37
ADLS_ACUITY_SCORE: 45
ORAL_HYGIENE: INDEPENDENT
ADLS_ACUITY_SCORE: 37
ADLS_ACUITY_SCORE: 37
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
## 2023-05-27 NOTE — ED NOTES
Call received from [facility] for report. Staff informed of patient does not want to be woken for transfer to the unit and wants to wait until the morning.

## 2023-05-27 NOTE — PHARMACY-ADMISSION MEDICATION HISTORY
Admission Medication History Completed by Pharmacy    See [facility] Admission Navigator for allergy information, preferred outpatient pharmacy, prior to admission medications and immunization status.     Medication history sources:  Patient; Surescripts; recent family practice visit note from 5/22/23; psychiatry visit note from 5/24/23     Pertinent changes made to PTA medication list:  Added: N/A  Deleted: N/A  Changed: N/A    Additional medication history information:   - Reports clonidine only made him more tired and did not help with anxiety. Prefers to use clonazepam for anxiety.   - Reports he takes his Ozempic and testosterone injections on Tuesdays. He believes someone from his group home can bring in his home supply of Ozempic. Writer attempted to call  a couple times over the last 2 days and has not heard back.   - Patient denies taking any additional Rx/OTC medications other than the ones listed below.    Prior to Admission medications    Medication Sig Last Dose Taking? Auth Provider Long Term End Date   ACE/ARB/ARNI NOT PRESCRIBED (INTENTIONAL) Please choose reason not prescribed from choices below. Unknown Yes Becki Avery APRN CNP Yes    amLODIPine (NORVASC) 10 MG tablet Take 1 tablet (10 mg) by mouth daily 5/26/2023 Yes Becki Avery APRN CNP Yes    amphetamine-dextroamphetamine (ADDERALL XR) 20 MG 24 hr capsule Take 1 capsule (20 mg) by mouth daily 5/26/2023 Yes Regine Last APRN CNP     ASPIRIN NOT PRESCRIBED (INTENTIONAL) Please choose reason not prescribed from choices below. Unknown Yes Becki Avery APRN CNP Yes    aspirin-acetaminophen-caffeine (EXCEDRIN MIGRAINE) 250-250-65 MG tablet Take 1 tablet by mouth daily as needed for headaches Past Month Yes Becki Avery APRN CNP     benzoyl peroxide 5 % external liquid Apply topically daily 5/23/2023 Yes Mark Cleary MD     clonazePAM (KLONOPIN) 1 MG tablet Take 1 tablet (1 mg) by  mouth daily as needed (severe anxiety only) 5/26/2023 Yes Regine Last APRN CNP Yes    cloNIDine (CATAPRES) 0.1 MG tablet Take 1 tablet (0.1 mg) by mouth daily as needed (anxiety) Past Week Yes Regine Last APRN CNP Yes    doxycycline monohydrate (MONODOX) 100 MG capsule Take 1 capsule (100 mg) by mouth 2 times daily 5/26/2023 Yes Mark Cleary MD     gabapentin (NEURONTIN) 600 MG tablet Take 2 tablets (1,200 mg) by mouth 3 times daily 5/26/2023 Yes Luz Moncada APRN CNP Yes    insulin glargine (LANTUS PEN) 100 UNIT/ML pen Take 15 units daily.  Take half dose, 7 units, on days you do not eat 5/26/2023 Yes Becki Avery APRN CNP Yes    OLANZapine (ZYPREXA) 20 MG tablet Take 1 tablet (20 mg) by mouth At Bedtime 5/25/2023 Yes Regine Last APRN CNP Yes    OLANZapine (ZYPREXA) 5 MG tablet Take 1 tablet (5 mg) by mouth At Bedtime Together with one 20 mg tablet to make total of 25 mg at bedtime. 5/25/2023 Yes Regine Last APRN CNP Yes    omeprazole (PRILOSEC) 20 MG DR capsule Take 1 capsule (20 mg) by mouth daily 5/26/2023 Yes Becki Avery APRN CNP No    ondansetron (ZOFRAN ODT) 4 MG ODT tab Take 1 tablet (4 mg) by mouth every 8 hours as needed for nausea Past Month Yes Becki Avery APRN CNP     rosuvastatin (CRESTOR) 10 MG tablet Take 1 tablet (10 mg) by mouth daily 5/26/2023 Yes Becki Avery APRN CNP Yes    Semaglutide, 1 MG/DOSE, (OZEMPIC) 4 MG/3ML pen Inject 1 mg Subcutaneous every 7 days 5/16/2023 Yes Becki Avery APRN CNP     testosterone cypionate (DEPOTESTOSTERONE) 200 MG/ML injection Inject 0.1 mLs (20 mg) Subcutaneous once a week 5/23/2023 Yes Nory Bland MD Yes    tretinoin (RETIN-A) 0.05 % external cream Apply topically At Bedtime Pea-sized amount to whole face 5/23/2023 Yes Mark Cleary MD            Date completed: 05/26/23    Medication history completed by:   Kamala Donnelly, PharmD   *[phone]

## 2023-05-27 NOTE — ED NOTES
"Informed by previous shift RN charge that the patient had stated they \"do not want to be woken up in the middle of the night to be taken upstairs. They would rather wait until the morning.\" Previous charge nurse clarified that if the patient can not be transferred upstairs before midnight the patient will most likely decline until the morning.  "

## 2023-05-27 NOTE — ED NOTES
"Patient is calm and quiet, patient has flat affect, and cooperative for assessment. Patient endorses commanding auditory hallucination that tells him to kill himself. Patient stated that that he turned manic at the beginning of this week. Patient is on antidepressant medication and he said, \"I turned manic after starting the meds. The I felt suicidal ideation. I thought of over dosing with meds but couldn't.\" Patient stated that he had a gun but he kept it at his parents' place. When the manic situation started to escalate, his group home called 9-1-1 and was brought by ambulance. Patient had four times suicidal attempt so far. Patient claims to feel better today. The SI is decreasing in intensity. He endorses mild pain on his lower back. Patient is to be transferred to a different unit on the next shift. Patient was compliant with medication administration. Patient will be closely monitored.  "

## 2023-05-27 NOTE — ED NOTES
Report received from previous shift. Assumed care of patient sleeping with no s/s of acute distress. Pt noted to be breathing with ease. Informed from previous nurse that admission to [facility] is pending and the unit will call when ready to receive the patient. Will continue to monitor.

## 2023-05-27 NOTE — CONSULTS
Brief Medicine Note    Medicine service consulted to aid in management of patient with insulin-dependent diabetes mellitus.  It appears at baseline patient is on 15 units Lantus daily, as well as weekly 1 mg injection of Ozempic.  PCP notes reviewed, on 5/22/2023 patient was seen in clinic by PCP, at that point he had reported blood sugars been consistently over 400.  At that time, his Lantus was increased from 11 units to 15 units daily.  He checks his blood sugars at various times during the day. Per nurse, patient has eaten well thus far on the unit.  No nausea or vomiting.  A1c of 9.1 on 5/5/2023.  Glycemic control has been fair thus far since hospitalization, has been on his home dose Lantus here.    Recent Labs   Lab 05/27/23  0943 05/26/23  0829 05/25/23  1947 05/25/23  1419 05/25/23  0817 05/25/23  0209   * 180* 167* 202* 246* 116*       Plan:    Continue semaglutide to be given weekly on Tuesdays as ordered.    Continue Lantus 15 units here.    Initiate sliding scale insulin, low insulin resistance to start.  We will follow peripherally and adjust regimen as indicated.      Yolie Rodriguez PA-C  [facility]  5/27/2023 12:51 PM

## 2023-05-27 NOTE — TELEPHONE ENCOUNTER
11:24 AM: Pt admitted to station 12 and is no longer in queue. Pt removed from work list, Intake no longer following.

## 2023-05-27 NOTE — PROGRESS NOTES
[facility] ED Mental Health Handoff Note:       Brief HPI:  This is a 32 year old adult signed out to me by Dr. Godfrey.  See initial ED Provider note for full details of the presentation. Interval history is pertinent for pending transfer.    Home meds reviewed and ordered/administered: Yes    Medically stable for inpatient mental health admission: Yes.    Evaluated by mental health: Yes. The recommendation is for inpatient mental health treatment. Bed secured and awaiting admission/transfer.    Safety concerns: At the time I received sign out, there were no safety concerns.    Hold Status:  Active Orders   N/A           Exam:   Patient Vitals for the past 24 hrs:   BP Temp Temp src Pulse Resp SpO2   05/26/23 2250 120/74 98.3  F (36.8  C) Oral 94 16 98 %   05/26/23 2248 120/74 -- -- -- -- --   05/26/23 0923 (!) 144/96 97.3  F (36.3  C) Oral 100 16 96 %   05/26/23 0913 (!) 144/96 -- -- -- -- --           ED Course:    Medications   amLODIPine (NORVASC) tablet 10 mg (10 mg Oral $Given 5/26/23 0913)   amphetamine-dextroamphetamine (ADDERALL XR) ER cap 20 mg (20 mg Oral $Given 5/26/23 0913)   cloNIDine (CATAPRES) tablet 0.1 mg (0.1 mg Oral $Given 5/25/23 1947)   gabapentin (NEURONTIN) tablet 1,200 mg (1,200 mg Oral $Given 5/26/23 2109)   doxycycline hyclate (VIBRAMYCIN) capsule 100 mg (100 mg Oral $Given 5/26/23 2110)   omeprazole (priLOSEC) CR capsule 20 mg (20 mg Oral $Given 5/26/23 0914)   rosuvastatin (CRESTOR) tablet 10 mg (10 mg Oral $Given 5/26/23 0922)   glucose gel 15-30 g (has no administration in time range)     Or   dextrose 50 % injection 25-50 mL (has no administration in time range)     Or   glucagon injection 1 mg (has no administration in time range)   nicotine polacrilex (NICORETTE) gum 4 mg (4 mg Buccal $Given 5/26/23 2131)   insulin glargine (LANTUS PEN) injection 15 Units (15 Units Subcutaneous $Given 5/26/23 8918)   OLANZapine zydis (zyPREXA) ODT tab 5 mg (has no administration in time range)    OLANZapine (zyPREXA) tablet 30 mg (30 mg Oral $Given 5/26/23 2246)   clonazePAM (klonoPIN) tablet 1 mg (1 mg Oral $Given 5/26/23 2109)   lamoTRIgine (LaMICtal) tablet 25 mg (25 mg Oral $Given 5/26/23 2109)   prazosin (MINIPRESS) capsule 1 mg (1 mg Oral $Given 5/26/23 2248)   nicotine (NICODERM CQ) 21 MG/24HR 24 hr patch 1 patch (1 patch Transdermal $Patch/Med Applied 5/26/23 1858)     And   nicotine Patch in Place (1 each Transdermal Patch in Place 5/26/23 5400)   acetaminophen (TYLENOL) tablet 650 mg (650 mg Oral $Given 5/26/23 1536)   ibuprofen (ADVIL/MOTRIN) tablet 600 mg (600 mg Oral $Given 5/26/23 1642)            There were no significant events during my shift.          Impression:    ICD-10-CM    1. Schizoaffective disorder, bipolar type (H)  F25.0       2. Moderate episode of recurrent major depressive disorder (H)  F33.1       3. Suicidal ideation  R45.851           Plan:    1. Awaiting inpatient mental health admission/transfer.      RESULTS:   Results for orders placed or performed during the hospital encounter of 05/24/23 (from the past 24 hour(s))   Glucose by meter     Status: Abnormal    Collection Time: 05/26/23  8:29 AM   Result Value Ref Range    GLUCOSE BY METER POCT 180 (H) 70 - 99 mg/dL             IRIS BOO MD PhD

## 2023-05-27 NOTE — ED NOTES
Pt slept throughout the shift. No s/s of acute distress during rounding and patient was noted to be breathing with ease. No safety events or concerns throughout the shift. Pending admission to [email or web address] after patient awakens and shift change. Will continue to monitor.

## 2023-05-27 NOTE — PROVIDER NOTIFICATION
05/27/23 1314   Patient Belongings   Did you bring any home meds/supplements to the hospital?  No   Patient Belongings locker   Patient Belongings Put in Hospital Secure Location (Security or Locker, etc.) clothing;cell phone/electronics   Clothing Search Yes   Second Staff JR   Pant 1  Shorts 4  Underwear/boxers 5  T-shirts 5  Socks 2  Sets of coloring pencils 2  , iphone & earpods  Snacks & hygiene stuff.   Note: No wallet, ID, money or credit/debt cards found in belongings    A               Admission:  I am responsible for any personal items that are not sent to the safe or pharmacy.  [facility] is not responsible for loss, theft or damage of any property in my possession.    Signature:  _________________________________ Date: _______  Time: _____                                              Staff Signature:  ____________________________ Date: ________  Time: _____      2nd Staff person, if patient is unable/unwilling to sign:    Signature: ________________________________ Date: ________  Time: _____     Discharge:  [facility] has returned all of my personal belongings:    Signature: _________________________________ Date: ________  Time: _____                                          Staff Signature:  ____________________________ Date: ________  Time: _____

## 2023-05-27 NOTE — H&P
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 capsule Take 1 capsule (20 mg) by mouth daily 90 capsule 3 5/26/2023     ondansetron (ZOFRAN ODT) 4 MG ODT tab Take 1 tablet (4 mg) by mouth every 8 hours as needed for nausea 30 tablet 2 Past Month     rosuvastatin (CRESTOR) 10 MG tablet Take 1 tablet (10 mg) by mouth daily 90 tablet 3 5/26/2023     Semaglutide, 1 MG/DOSE, (OZEMPIC) 4 MG/3ML pen Inject 1 mg Subcutaneous every 7 days 3 mL 0 5/16/2023     syringe, disposable, 1 ML MISC Use to draw up and administer weekly testosterone dose 25 each 1 Unknown     testosterone cypionate (DEPOTESTOSTERONE) 200 MG/ML injection Inject 0.1 mLs (20 mg) Subcutaneous once a week 5 mL 1 5/23/2023     thin (NO BRAND SPECIFIED) lancets Use with lanceting device to check blood sugars once per day. To accompany: Blood Glucose Monitor Brands: per insurance. 100 each 3 Unknown     tretinoin (RETIN-A) 0.05 % external cream Apply topically At Bedtime Pea-sized amount to whole face 45 g 3 5/23/2023             Labs:     Recent Results (from the past 24 hour(s))   Glucose by meter    Collection Time: 05/27/23  9:43 AM   Result Value Ref Range    GLUCOSE BY METER POCT 224 (H) 70 - 99 mg/dL       /82   Pulse 94   Temp 98.3  F (36.8  C) (Oral)   Resp 16   LMP  (LMP Unknown)   SpO2 98%   Weight is 0 lbs 0 oz  There is no height or weight on file to calculate BMI.         " "Psychiatric Mental Status Examination:   Appearance: awake, alert, adequately groomed, dressed in scrubs  Attitude: cooperative   Eye Contact: fair  Mood:  \"anxious\" and depressed   Affect: mood congruent and full range  Speech:  clear, coherent and normal prosody  Language: fluent in English  Psychomotor Behavior:  no evidence of tardive dyskinesia, dystonia, or tics  Gait/Station: ambulates independently   Thought Process:  linear, logical, goal oriented  Associations:  no loose associations  Thought Content: reports passive SI, denies HI, reports AH some command in nature, denies VH  Insight:  partial  Judgement: fair  Oriented to:  time, person, and place  Attention Span and Concentration:  intact  Recent and Remote Memory:  intact  Fund of Knowledge: appropriate         Physical Exam:   Please refer to physical exam completed by ED provider, Hector Mendoza MD, on 5/24/23 as below. I agree with the findings and assessment and have no additional findings to add at this time with exception of psychiatric findings as above in MSE.     Physical Exam  Constitutional:       General: He is not in acute distress.     Appearance: Normal appearance. He is not diaphoretic.   HENT:      Head: Atraumatic.      Mouth/Throat:      Mouth: Mucous membranes are moist.   Eyes:      General: No scleral icterus.     Conjunctiva/sclera: Conjunctivae normal.   Cardiovascular:      Rate and Rhythm: Normal rate.      Heart sounds: Normal heart sounds.   Pulmonary:      Effort: No respiratory distress.      Breath sounds: Normal breath sounds.   Abdominal:      General: Abdomen is flat.   Musculoskeletal:      Cervical back: Neck supple.   Skin:     General: Skin is warm.      Findings: No rash.   Neurological:      General: No focal deficit present.      Mental Status: He is alert and oriented to person, place, and time.      Sensory: No sensory deficit.      Motor: No weakness.      Coordination: Coordination normal.   Psychiatric:  "        Mood and Affect: Mood is anxious.         Behavior: Behavior is cooperative.         Thought Content: Thought content includes suicidal ideation. Thought content includes suicidal plan.

## 2023-05-27 NOTE — ED NOTES
Patient transported to station 12. Reports was called and all patient items were sent with patient.  Patient in no distress at time of transfer. Nothing further

## 2023-05-27 NOTE — ED NOTES
Patient is calm and cooperative, but states anxiety is 7/10. Patient is friendly and engages staff with no issues. Patient is independent and alert and oriented x3. Patient denies HI/AVH, but endorses SI with plan to overdose. Patient took all medications this am with no problems. Patient complained of some constipation, and writer recommended that patient drink water and walk around the unit to increase likelihood of having a BM. Writer will request from physician for miralax or possible colace. Will continue to monitor patient at this time for safety.

## 2023-05-28 LAB
ALBUMIN SERPL BCG-MCNC: 4.1 G/DL (ref 3.5–5.2)
ALP SERPL-CCNC: 73 U/L (ref 35–129)
ALT SERPL W P-5'-P-CCNC: 82 U/L (ref 10–50)
ANION GAP SERPL CALCULATED.3IONS-SCNC: 13 MMOL/L (ref 7–15)
AST SERPL W P-5'-P-CCNC: 76 U/L (ref 10–50)
BILIRUB SERPL-MCNC: 0.2 MG/DL
BUN SERPL-MCNC: 12.5 MG/DL (ref 6–20)
CALCIUM SERPL-MCNC: 9.6 MG/DL (ref 8.6–10)
CHLORIDE SERPL-SCNC: 103 MMOL/L (ref 98–107)
CHOLEST SERPL-MCNC: 119 MG/DL
CREAT SERPL-MCNC: 0.54 MG/DL (ref 0.51–1.17)
DEPRECATED HCO3 PLAS-SCNC: 22 MMOL/L (ref 22–29)
GFR SERPL CREATININE-BSD FRML MDRD: >90 ML/MIN/1.73M2
GLUCOSE BLDC GLUCOMTR-MCNC: 131 MG/DL (ref 70–99)
GLUCOSE BLDC GLUCOMTR-MCNC: 155 MG/DL (ref 70–99)
GLUCOSE BLDC GLUCOMTR-MCNC: 189 MG/DL (ref 70–99)
GLUCOSE BLDC GLUCOMTR-MCNC: 239 MG/DL (ref 70–99)
GLUCOSE SERPL-MCNC: 154 MG/DL (ref 70–99)
HDLC SERPL-MCNC: 35 MG/DL
LDLC SERPL CALC-MCNC: 36 MG/DL
NONHDLC SERPL-MCNC: 84 MG/DL
POTASSIUM SERPL-SCNC: 4.2 MMOL/L (ref 3.4–5.3)
PROT SERPL-MCNC: 7 G/DL (ref 6.4–8.3)
SODIUM SERPL-SCNC: 138 MMOL/L (ref 136–145)
TRIGL SERPL-MCNC: 240 MG/DL
TSH SERPL DL<=0.005 MIU/L-ACNC: 1.62 UIU/ML (ref 0.3–4.2)
WBC # BLD AUTO: 8.7 10E3/UL (ref 4–11)

## 2023-05-28 PROCEDURE — 85048 AUTOMATED LEUKOCYTE COUNT: CPT | Performed by: PSYCHIATRY & NEUROLOGY

## 2023-05-28 PROCEDURE — 250N000013 HC RX MED GY IP 250 OP 250 PS 637: Performed by: PSYCHIATRY & NEUROLOGY

## 2023-05-28 PROCEDURE — 250N000012 HC RX MED GY IP 250 OP 636 PS 637: Performed by: PSYCHIATRY & NEUROLOGY

## 2023-05-28 PROCEDURE — 250N000012 HC RX MED GY IP 250 OP 636 PS 637: Performed by: PHYSICIAN ASSISTANT

## 2023-05-28 PROCEDURE — 84443 ASSAY THYROID STIM HORMONE: CPT | Performed by: PSYCHIATRY & NEUROLOGY

## 2023-05-28 PROCEDURE — 36415 COLL VENOUS BLD VENIPUNCTURE: CPT | Performed by: PSYCHIATRY & NEUROLOGY

## 2023-05-28 PROCEDURE — 124N000002 HC R&B MH UMMC

## 2023-05-28 PROCEDURE — 99233 SBSQ HOSP IP/OBS HIGH 50: CPT | Performed by: PSYCHIATRY & NEUROLOGY

## 2023-05-28 PROCEDURE — 80053 COMPREHEN METABOLIC PANEL: CPT | Performed by: PSYCHIATRY & NEUROLOGY

## 2023-05-28 PROCEDURE — 80061 LIPID PANEL: CPT | Performed by: PSYCHIATRY & NEUROLOGY

## 2023-05-28 RX ORDER — DEXTROAMPHETAMINE SACCHARATE, AMPHETAMINE ASPARTATE MONOHYDRATE, DEXTROAMPHETAMINE SULFATE AND AMPHETAMINE SULFATE 5; 5; 5; 5 MG/1; MG/1; MG/1; MG/1
20 CAPSULE, EXTENDED RELEASE ORAL DAILY
Status: DISCONTINUED | OUTPATIENT
Start: 2023-05-28 | End: 2023-06-09 | Stop reason: HOSPADM

## 2023-05-28 RX ADMIN — OMEPRAZOLE 20 MG: 20 CAPSULE, DELAYED RELEASE ORAL at 08:51

## 2023-05-28 RX ADMIN — INSULIN GLARGINE 15 UNITS: 100 INJECTION, SOLUTION SUBCUTANEOUS at 09:28

## 2023-05-28 RX ADMIN — ROSUVASTATIN CALCIUM 10 MG: 10 TABLET, FILM COATED ORAL at 10:26

## 2023-05-28 RX ADMIN — DEXTROAMPHETAMINE SULFATE, DEXTROAMPHETAMINE SACCHARATE, AMPHETAMINE SULFATE AND AMPHETAMINE ASPARTATE 20 MG: 5; 5; 5; 5 CAPSULE, EXTENDED RELEASE ORAL at 10:26

## 2023-05-28 RX ADMIN — GABAPENTIN 1200 MG: 600 TABLET, FILM COATED ORAL at 20:15

## 2023-05-28 RX ADMIN — CLONAZEPAM 1 MG: 1 TABLET ORAL at 08:52

## 2023-05-28 RX ADMIN — NICOTINE POLACRILEX 4 MG: 4 GUM, CHEWING BUCCAL at 10:26

## 2023-05-28 RX ADMIN — NICOTINE POLACRILEX 4 MG: 4 GUM, CHEWING BUCCAL at 09:12

## 2023-05-28 RX ADMIN — OLANZAPINE 30 MG: 15 TABLET, FILM COATED ORAL at 22:25

## 2023-05-28 RX ADMIN — NICOTINE 1 PATCH: 21 PATCH, EXTENDED RELEASE TRANSDERMAL at 08:52

## 2023-05-28 RX ADMIN — INSULIN ASPART 1 UNITS: 100 INJECTION, SOLUTION INTRAVENOUS; SUBCUTANEOUS at 17:12

## 2023-05-28 RX ADMIN — CLONAZEPAM 1 MG: 1 TABLET ORAL at 20:15

## 2023-05-28 RX ADMIN — NICOTINE POLACRILEX 4 MG: 4 GUM, CHEWING BUCCAL at 12:46

## 2023-05-28 RX ADMIN — DOXYCYCLINE HYCLATE 100 MG: 50 CAPSULE ORAL at 20:04

## 2023-05-28 RX ADMIN — DOXYCYCLINE HYCLATE 100 MG: 50 CAPSULE ORAL at 08:51

## 2023-05-28 RX ADMIN — NICOTINE POLACRILEX 4 MG: 4 GUM, CHEWING BUCCAL at 17:24

## 2023-05-28 RX ADMIN — AMLODIPINE BESYLATE 10 MG: 10 TABLET ORAL at 08:51

## 2023-05-28 RX ADMIN — NICOTINE POLACRILEX 4 MG: 4 GUM, CHEWING BUCCAL at 19:03

## 2023-05-28 RX ADMIN — GABAPENTIN 1200 MG: 600 TABLET, FILM COATED ORAL at 08:51

## 2023-05-28 RX ADMIN — LAMOTRIGINE 25 MG: 25 TABLET ORAL at 08:53

## 2023-05-28 RX ADMIN — NICOTINE POLACRILEX 4 MG: 4 GUM, CHEWING BUCCAL at 16:21

## 2023-05-28 RX ADMIN — GABAPENTIN 1200 MG: 600 TABLET, FILM COATED ORAL at 13:38

## 2023-05-28 ASSESSMENT — ACTIVITIES OF DAILY LIVING (ADL)
DRESS: SCRUBS (BEHAVIORAL HEALTH)
ADLS_ACUITY_SCORE: 45
HYGIENE/GROOMING: INDEPENDENT
ADLS_ACUITY_SCORE: 45
ADLS_ACUITY_SCORE: 45
HYGIENE/GROOMING: INDEPENDENT
ADLS_ACUITY_SCORE: 45
ORAL_HYGIENE: INDEPENDENT
ADLS_ACUITY_SCORE: 45

## 2023-05-28 NOTE — PLAN OF CARE
"Problem: Suicide Risk  Goal: Absence of Self-Harm  Outcome: Progressing  Intervention: Assess Risk to Self and Maintain Safety  Behavior Management:    impulse control promoted    behavioral plan reviewed  Self-Harm Prevention: environmental self-harm risks assessed    Patient is alert and oriented, calm and cooperative. Patient was present in the milieu and appeared minimally social and interactive with peers/staff. Patient has a blunted/flat affect, normal speech and some insight into his situation. Patient was offered and encouraged to shower and did shower this shift. Patient voices his needs appropriately. Patient endorses racing thoughts,  depression and anxiety rating  8/10 for both and voiced that the medications are not helpful relieving anxiety. Patient appears anxious and taps his foot on the floor. Patient was offered both pharmacological PRN and non-pharmacological interventions to target anxiety and he declined. Patient continues to endorse passive suicidal ideations and auditory hallucinations where voices are telling him to \"kill myself\". Patient denied thoughts of SIB/HI, visual hallucinations, and thoughts of paranoia. Patient is eating, hydrating, and sleeping adequately.     Patient is medication compliant, with reminders. Medication side effects were not observed or reported this shift. Patient denies pain/physical discomfort. No acute medical concern. VSS /83 (BP Location: Left arm, Patient Position: Sitting)   Pulse 107   Temp 97.1  F (36.2  C) (Temporal)   Resp 17   Wt 107.9 kg (237 lb 12.8 oz)   LMP  (LMP Unknown)   SpO2 97%   BMI 39.27 kg/m      PRN's given this shift: Nicotine gum x2    Medications refused: Benzoyl peroxide % lotion    Patient didn't receive his correction insulin this morning after breakfast. Insulin Aspart pen was not available and by the time it arrived from the pharmacy, it was too late to administer. No correction insulin also administered after lunch (BG " 131).     Recent Labs   Lab 05/28/23  1158 05/28/23  0808 05/28/23  0756 05/27/23  2120 05/27/23  1710 05/27/23  1246   * 155* 154* 188* 98 132*

## 2023-05-28 NOTE — PLAN OF CARE
Problem: Adult Behavioral Health Plan of Care  Goal: Plan of Care Review  Outcome: Progressing  Flowsheets (Taken 5/27/2023 1900)  Patient Agreement with Plan of Care: agrees     Problem: Suicide Risk  Goal: Absence of Self-Harm  Outcome: Progressing   Goal Outcome Evaluation:    Plan of Care Reviewed With: parent    Patient is alert and oriented x 4. Patient is calm, disorganized and poorly groomed, has flat affect. Patient did not associate much with other peers, he has been isolative, sporadically visible at the milieu. Patient is diabetic, continues with blood sugar checks BID during this shift, no coverage given.The patient was medication compliant, no side effects endorsed, meal consumption was adequate. Patient used nicotine gum few times during this shift, he had his testosterone 0.1ml injection this evening, remaining was discarded and witnessed by another nurse. Patient denies SI/SIB/HI and AVH. Will continue to monitor.

## 2023-05-28 NOTE — PROGRESS NOTES
SPIRITUAL HEALTH SERVICES  SPIRITUAL ASSESSMENT Progress Note  [facility] ([facility]) 12NB   ON-CALL VISIT    REFERRAL SOURCE: Admission     Introduced SHS. Prakash declined visit at present and stated no need for follow-up. Pt prefers to request visit if/when desired.    PLAN: [facility] remains available.    Alice Lawson  Associate   Pager [phone]    [facility] remains available 24/7 for emergent requests/referrals, either by having the switchboard page the on-call  or by entering an ASAP/STAT consult in Epic (this will also page the on-call ).

## 2023-05-28 NOTE — PROGRESS NOTES
Update:   When writer arrived on the unit to meet with the pt, RN informed me that the pt had signed a 12hr-intent to leave the hospital.  Writer met with the pt and while we were talking Dr. Wu stopped in and we both had a conversation with the pt about his symptoms and best options at this time.     Pt agreed that due to still having suicidal ideations (no active plan), and ongoing command hallucinations to hurt self it would be in his best interest to remain in the hospital. Therefore, pt signed the form to revoke his 12 hr intent and agreed to remain hospitalized at this time.   Pt remains Voluntary.

## 2023-05-28 NOTE — PLAN OF CARE
Problem: Adult Behavioral Health Plan of Care  Goal: Adheres to Safety Considerations for Self and Others  Outcome: Progressing     Problem: Suicide Risk  Goal: Absence of Self-Harm  Outcome: Progressing     Pt slept through the night without distress. Pt reported no pain or discomfort. No problems with behavior. No concerns reported by pt. Pt slept 6.5 hours.  Staff will continue to monitor.

## 2023-05-28 NOTE — PROGRESS NOTES
[facility], [place]   Psychiatric Progress Note  Hospital Day: 1        Interim History:   The patient's care was discussed with the treatment team during the daily team meeting and/or staff's chart notes were reviewed.  Staff report patient has been visible in the milieu, taking medications as prescribed, reporting ongoing suicidal thoughts and command auditory hallucinations, continues to report high levels of anxiety with minimal improvement with medications including as needed clonidine, received testosterone injection in the evening, slept 6.5 hours.  This morning upon not receiving Adderall became dysregulated and signed 12-hour intent to discharge.    Upon interview, the patient reports that they continue to feel depressed, reports suicidal thoughts are a little bit better and that they are not having any thoughts of a plan.  Continue to have auditory hallucinations commanding him to harm himself, states that the voices tell him to either shoot himself or stab himself.  Does not report any visual hallucinations.  Denies that the command hallucinations tell him to harm others, denies HI.  Tolerating medications.  Writer reviewed why PTA Adderall has been held on admission, provided education around concerns for this medication causing a worsening of psychosis or elevated mood states.  Patient states he has been on this medication since he was 7 years old, has tolerated it without any concerns, denies that he has noticed any changes with psychosis symptoms or mood states when on or off this medication.  Does report that he feels more tired and less able to function without this medication.  He apologized for being upset this morning, was receptive to discussion around this medication, continues to want to resume it.  Discussed plan to resume it cautiously while monitoring for any worsening of symptoms and he states that he will be honest with staff and team about any changes with  resuming this medication.  Tolerating other medications.  Denies having any acute physical health concerns, the muscle pain on the left side of his chest wall has been improving.  Reports that he is working with staff around the management of his blood sugars.  Slept okay.  Has been eating okay since admission.  He was meeting with [facility] to complete psychosocial assessment and denied having any other questions or concerns for writer.  He agreed to rescind his 12-hour intent for discharge and remain as a voluntary patient.           Medications:       amLODIPine  10 mg Oral Daily     amphetamine-dextroamphetamine  20 mg Oral Daily     benzoyl peroxide   Topical Daily     clonazePAM  1 mg Oral BID     doxycycline hyclate  100 mg Oral BID     gabapentin  1,200 mg Oral TID     insulin aspart  1-3 Units Subcutaneous TID AC     insulin aspart  1-3 Units Subcutaneous At Bedtime     insulin glargine  15 Units Subcutaneous QAM AC     lamoTRIgine  25 mg Oral Daily     nicotine  1 patch Transdermal Daily    And     nicotine   Transdermal Q8H RADHA     OLANZapine  30 mg Oral At Bedtime     omeprazole  20 mg Oral Daily     prazosin  1 mg Oral At Bedtime     rosuvastatin  10 mg Oral Daily     [START ON 5/30/2023] Semaglutide (1 MG/DOSE)  1 mg Subcutaneous Q7 Days     testosterone cypionate  20 mg Subcutaneous Weekly     tretinoin   Topical At Bedtime          Allergies:     Allergies   Allergen Reactions     Haldol [Haloperidol] Other (See Comments)     Makes patient very angry and anxious     Adhesive Tape Hives     Percocet [Oxycodone-Acetaminophen] Nausea and Vomiting     Prednisone Other (See Comments) and Hives     Suicidal ideation     Risperidone Other (See Comments)     Tramadol Hcl Nausea and Vomiting     Droperidol Anxiety     Seroquel [Quetiapine] Palpitations     Spent 2 weeks in the hospital due to having seroquel, caused palpitations and QT prolongation          Labs:     Recent Results (from the past 48 hour(s))    Glucose by meter    Collection Time: 05/27/23  9:43 AM   Result Value Ref Range    GLUCOSE BY METER POCT 224 (H) 70 - 99 mg/dL   Glucose by meter    Collection Time: 05/27/23 12:46 PM   Result Value Ref Range    GLUCOSE BY METER POCT 132 (H) 70 - 99 mg/dL   EKG 12-lead, complete    Collection Time: 05/27/23  2:05 PM   Result Value Ref Range    Systolic Blood Pressure  mmHg    Diastolic Blood Pressure  mmHg    Ventricular Rate 111 BPM    Atrial Rate 111 BPM    WA Interval 154 ms    QRS Duration 96 ms     ms    QTc 476 ms    P Axis 42 degrees    R AXIS 16 degrees    T Axis 48 degrees    Interpretation ECG       Sinus tachycardia  Otherwise normal ECG  When compared with ECG of 05-MAY-2022 17:26,  Nonspecific T wave abnormality no longer evident in Inferior leads     Glucose by meter    Collection Time: 05/27/23  5:10 PM   Result Value Ref Range    GLUCOSE BY METER POCT 98 70 - 99 mg/dL   Glucose by meter    Collection Time: 05/27/23  9:20 PM   Result Value Ref Range    GLUCOSE BY METER POCT 188 (H) 70 - 99 mg/dL   Lipid panel    Collection Time: 05/28/23  7:56 AM   Result Value Ref Range    Cholesterol 119 <200 mg/dL    Triglycerides 240 (H) <150 mg/dL    Direct Measure HDL 35 (L) >=40 mg/dL    LDL Cholesterol Calculated 36 <=100 mg/dL    Non HDL Cholesterol 84 <130 mg/dL   TSH with free T4 reflex and/or T3 as indicated    Collection Time: 05/28/23  7:56 AM   Result Value Ref Range    TSH 1.62 0.30 - 4.20 uIU/mL   Comprehensive metabolic panel    Collection Time: 05/28/23  7:56 AM   Result Value Ref Range    Sodium 138 136 - 145 mmol/L    Potassium 4.2 3.4 - 5.3 mmol/L    Chloride 103 98 - 107 mmol/L    Carbon Dioxide (CO2) 22 22 - 29 mmol/L    Anion Gap 13 7 - 15 mmol/L    Urea Nitrogen 12.5 6.0 - 20.0 mg/dL    Creatinine 0.54 0.51 - 1.17 mg/dL    Calcium 9.6 8.6 - 10.0 mg/dL    Glucose 154 (H) 70 - 99 mg/dL    Alkaline Phosphatase 73 35 - 129 U/L    AST 76 (H) 10 - 50 U/L    ALT 82 (H) 10 - 50 U/L    Protein  Total 7.0 6.4 - 8.3 g/dL    Albumin 4.1 3.5 - 5.2 g/dL    Bilirubin Total 0.2 <=1.2 mg/dL    GFR Estimate >90 >60 mL/min/1.73m2   Glucose by meter    Collection Time: 05/28/23  8:08 AM   Result Value Ref Range    GLUCOSE BY METER POCT 155 (H) 70 - 99 mg/dL   WBC count    Collection Time: 05/28/23  8:15 AM   Result Value Ref Range    WBC Count 8.7 4.0 - 11.0 10e3/uL          Psychiatric Examination:     BP (!) 140/91   Pulse 101   Temp 97.1  F (36.2  C) (Temporal)   Resp 16   Wt 107.9 kg (237 lb 12.8 oz)   LMP  (LMP Unknown)   SpO2 98%   BMI 39.27 kg/m    Weight is 237 lbs 12.8 oz  Body mass index is 39.27 kg/m .    Orthostatic Vitals     None        Appearance: awake, alert, adequately groomed and dressed in hospital scrubs  Attitude:  somewhat cooperative  Eye Contact:  fair  Mood:  depressed, anxious, more irritable  Affect:  mood congruent  Speech:  clear, coherent and normal prosody  Language: fluent and intact in English  Psychomotor, Gait, Musculoskeletal:  no evidence of tardive dyskinesia, dystonia, or tics, fidgeting   Thought Process:  goal oriented  Associations:  no loose associations  Thought Content:  reports passive SI, denies HI, reports CAH telling him to harm self, denies VH  Insight:  partial  Judgement:  fair  Oriented to:  time, person, and place  Attention Span and Concentration:  fair  Recent and Remote Memory:  intact  Fund of Knowledge:  appropriate           Precautions:     Behavioral Orders   Procedures     Code 1 - Restrict to Unit     Routine Programming     As clinically indicated     Status 15     Every 15 minutes.     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   Ecstasy use disorder, 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 Medically cleared in ED, UDS positive for amphetamines (on adderall) and cannabinoids; was evaluated by psychiatry consult service. PTA olanzapine and clonazepam increased, prazosin started for PTSD and lamotrigine started for mood. Admitted to 12N as voluntary patient and overflow patient for high acuity unit. PTA medications continued along with changes made to medications in ED with exception of Adderall discontinued at this time for further evaluation by primary team. Pt also requests to take testosterone IM today as has missed dose for 2 weeks due to increased depression/amotivation. Pt unsure of goals for hospitalization other to have SI, anxiety improved, likely will return to . Pt signed 12H intent due to Adderall not being ordered on admission to unit, provided education for patient, discussed R/B/A, pt continues to request to resume medication, will resume cautiously while monitoring closely for worsening of psychosis or mood instability. Pt rescinded 12H intent to discharge and remains voluntary patient.      Inpatient psychiatric hospitalization is  warranted at this time for safety, stabilization, and possible adjustment in medications.    Psychiatric treatment/interventions:  Medications:   -will resume PTA Adderall XR 20mg daily for ADHD, pt reports this has been well tolerated for >20 years with no change in psychosis, anxiety or mood state noted with changes in this medication, monitoring closely   -continue lamotrigine 25mg daily for mood, will follow standard titration and reduce risk of SJS   -Continue PTA clonazepam at increased dose of 1mg BID for anxiety   -Continue PTA gabapentin 1200 mg 3 times daily for anxiety and pain  -Continue PTA olanzapine at increased dose of 30 mg at bedtime for mood and psychosis   -Continue prazosin 1 mg at bedtime for PTSD, added hold parameters   -Continue PTA PRN clonidine 0.1 mg daily for anxiety  -PRN hydroxyzine 25mg every 4 hour for anxiety not improved with clonidine  -PRN melatonin 3 mg at bedtime for sleep   -continue PRN olanzapine 5-10mg PO or 10mg IM daily for agitation/psychosis, not to exceed 40mg in 24 hours  -Continue testosterone injections weekly, resumed Sat 5/27    The risks, benefits, alternatives and side effects have been discussed and are understood by the patient.     Laboratory/Imaging:TSH WNL and lipid panel with (H) and HDL 35(L) otherwise WNL; repeat WBC WNL; repeat CMP showed LFTs down trending with AST 76(H) and ALT 82(H), Ca now WNL and glucose 154(H); EKG with Sinus tachycardia with  and QTc of 476ms      Patient will be treated in therapeutic milieu with appropriate individual and group therapies as described.     Medical treatment/interventions:  Medical concerns:   1) Type 2 DM on insulin, poorly controlled, last A1c 9.1 on 5/5, had insulin dose increased on 5/22  -continue Lantus 15 units every morning before breakfast  -also per chart was prescribed semaglutide weekly, last dose 5/16, can use home supply if GH brings in  -BG checks ordered along with hypoglycemia  medications   -IM consult placedfor further management, appreciate assistance, per note 5/27:      Medicine service consulted to aid in management of patient with insulin-dependent diabetes mellitus.  It appears at baseline patient is on 15 units Lantus daily, as well as weekly 1 mg injection of Ozempic.  PCP notes reviewed, on 5/22/2023 patient was seen in clinic by PCP, at that point he had reported blood sugars been consistently over 400.  At that time, his Lantus was increased from 11 units to 15 units daily.  He checks his blood sugars at various times during the day. Per nurse, patient has eaten well thus far on the unit.  No nausea or vomiting.  A1c of 9.1 on 5/5/2023.  Glycemic control has been fair thus far since hospitalization, has been on his home dose Lantus here.              Recent Labs   Lab 05/27/23  0943 05/26/23  0829 05/25/23  1947 05/25/23  1419 05/25/23  0817 05/25/23  0209   * 180* 167* 202* 246* 116*         Plan:    Continue semaglutide to be given weekly on Tuesdays as ordered.    Continue Lantus 15 units here.    Initiate sliding scale insulin, low insulin resistance to start.  We will follow peripherally and adjust regimen as indicated.        Yolie Rodriguez, PA-C  [facility]       2) Elevated LFTs, hx of fatty liver per chart   -repeat CMP shows LFTs downtrending   -was evaluated by PCP on 5/22, recommended avoiding acetaminophen and alcohol with GI referral and Abd US ordered   -will defer additional work up to IM      3) Hypertriglyceridemia history   -ordered lipid panel as above, per chart has declined to take fish oil when recommended by PCP  -lipid panel with (H) and HDL 35(L) otherwise WNL   -recommend follow up with PCP as outpatient      4) Leukocytosis on admit WBC 11.4(H)  -repeat WBC this AM normalized at 8.7  -no other evidence of infection   -continue to monitor     5) HTN  -monitor BPs  -continued PTA amlodipine   -follow up with PCP     6)  Acne  -continued PTA doxycycline, benzoyl peroxide and tretinoin  -follow up with PCP/derm on discharge     7) Sinus tachycardia and borderline prolonged QTc of 476ms  -per EKG on 5/27  -continue to monitor vitals, electrolytes were WNL on admission   -recommend rechecking EKG periodically and avoiding QTc prolonging agents as able       Disposition Plan   Reason for ongoing admission: poses an imminent risk to self  Discharge location: group home  Discharge Medications: not ordered  Follow-up Appointments: not scheduled  Legal Status: voluntary, pt signed 12 hour intent to discharge, rescinded and remains voluntary     >50 min total time that was spent in counseling and coordination of care with staff, reviewing medical record, educating patient about treatment options, side effects and benefits and alternative treatments for medications, providing supportive therapy and redirection regarding above symptoms.     This document is created with the help of Dragon dictation system.  All grammatical/typing errors or context distortion are unintentional and inherent to software.    Patient has been seen and evaluated by me, Jennifer Wu DO.

## 2023-05-28 NOTE — PROGRESS NOTES
05/27/23 1912   Group Therapy Session   Group Attendance refused to attend group session   Time Session Began 1600   Time Session Ended 1700   Total Time (minutes) 0   Total # Attendees 5   Group Type psychotherapeutic   Group Session Detail Group members completed/discussed work sheet on Therapy goals.
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
## 2023-05-29 LAB
GLUCOSE BLDC GLUCOMTR-MCNC: 137 MG/DL (ref 70–99)
GLUCOSE BLDC GLUCOMTR-MCNC: 177 MG/DL (ref 70–99)
GLUCOSE BLDC GLUCOMTR-MCNC: 203 MG/DL (ref 70–99)
GLUCOSE BLDC GLUCOMTR-MCNC: 205 MG/DL (ref 70–99)

## 2023-05-29 PROCEDURE — 124N000002 HC R&B MH UMMC

## 2023-05-29 PROCEDURE — 250N000013 HC RX MED GY IP 250 OP 250 PS 637: Performed by: PSYCHIATRY & NEUROLOGY

## 2023-05-29 PROCEDURE — G0177 OPPS/PHP; TRAIN & EDUC SERV: HCPCS

## 2023-05-29 RX ADMIN — OLANZAPINE 30 MG: 15 TABLET, FILM COATED ORAL at 21:06

## 2023-05-29 RX ADMIN — DOXYCYCLINE HYCLATE 100 MG: 50 CAPSULE ORAL at 08:24

## 2023-05-29 RX ADMIN — PRAZOSIN HYDROCHLORIDE 1 MG: 1 CAPSULE ORAL at 21:06

## 2023-05-29 RX ADMIN — NICOTINE POLACRILEX 4 MG: 4 GUM, CHEWING BUCCAL at 14:23

## 2023-05-29 RX ADMIN — DEXTROAMPHETAMINE SULFATE, DEXTROAMPHETAMINE SACCHARATE, AMPHETAMINE SULFATE AND AMPHETAMINE ASPARTATE 20 MG: 5; 5; 5; 5 CAPSULE, EXTENDED RELEASE ORAL at 08:24

## 2023-05-29 RX ADMIN — AMLODIPINE BESYLATE 10 MG: 10 TABLET ORAL at 08:24

## 2023-05-29 RX ADMIN — OMEPRAZOLE 20 MG: 20 CAPSULE, DELAYED RELEASE ORAL at 08:24

## 2023-05-29 RX ADMIN — NICOTINE POLACRILEX 4 MG: 4 GUM, CHEWING BUCCAL at 11:24

## 2023-05-29 RX ADMIN — CLONAZEPAM 1 MG: 1 TABLET ORAL at 19:08

## 2023-05-29 RX ADMIN — CLONAZEPAM 1 MG: 1 TABLET ORAL at 08:24

## 2023-05-29 RX ADMIN — GABAPENTIN 1200 MG: 600 TABLET, FILM COATED ORAL at 08:24

## 2023-05-29 RX ADMIN — INSULIN GLARGINE 15 UNITS: 100 INJECTION, SOLUTION SUBCUTANEOUS at 08:26

## 2023-05-29 RX ADMIN — NICOTINE POLACRILEX 4 MG: 4 GUM, CHEWING BUCCAL at 18:30

## 2023-05-29 RX ADMIN — NICOTINE POLACRILEX 4 MG: 4 GUM, CHEWING BUCCAL at 16:39

## 2023-05-29 RX ADMIN — DOXYCYCLINE HYCLATE 100 MG: 50 CAPSULE ORAL at 19:08

## 2023-05-29 RX ADMIN — GABAPENTIN 1200 MG: 600 TABLET, FILM COATED ORAL at 19:08

## 2023-05-29 RX ADMIN — LAMOTRIGINE 25 MG: 25 TABLET ORAL at 08:24

## 2023-05-29 RX ADMIN — ROSUVASTATIN CALCIUM 10 MG: 10 TABLET, FILM COATED ORAL at 08:24

## 2023-05-29 RX ADMIN — NICOTINE 1 PATCH: 21 PATCH, EXTENDED RELEASE TRANSDERMAL at 08:24

## 2023-05-29 RX ADMIN — INSULIN ASPART 1 UNITS: 100 INJECTION, SOLUTION INTRAVENOUS; SUBCUTANEOUS at 08:28

## 2023-05-29 RX ADMIN — NICOTINE POLACRILEX 4 MG: 4 GUM, CHEWING BUCCAL at 10:45

## 2023-05-29 RX ADMIN — BENZOYL PEROXIDE: 50 LOTION TOPICAL at 08:25

## 2023-05-29 RX ADMIN — OLANZAPINE 10 MG: 5 TABLET, FILM COATED ORAL at 18:30

## 2023-05-29 RX ADMIN — NICOTINE POLACRILEX 4 MG: 4 GUM, CHEWING BUCCAL at 20:35

## 2023-05-29 RX ADMIN — HYDROXYZINE HYDROCHLORIDE 25 MG: 25 TABLET, FILM COATED ORAL at 16:35

## 2023-05-29 RX ADMIN — GABAPENTIN 1200 MG: 600 TABLET, FILM COATED ORAL at 15:47

## 2023-05-29 ASSESSMENT — ACTIVITIES OF DAILY LIVING (ADL)
DRESS: INDEPENDENT
ADLS_ACUITY_SCORE: 45
LAUNDRY: UNABLE TO COMPLETE
ADLS_ACUITY_SCORE: 45
ADLS_ACUITY_SCORE: 45
ORAL_HYGIENE: INDEPENDENT
ADLS_ACUITY_SCORE: 45
HYGIENE/GROOMING: INDEPENDENT
ADLS_ACUITY_SCORE: 45

## 2023-05-29 NOTE — PLAN OF CARE
Problem: Suicidal Behavior  Goal: Suicidal Behavior is Absent or Managed  Outcome: Progressing   Goal Outcome Evaluation:    Slept in much of the morning. Reports increased tiredness. Did not eat breakfast. Medication compliant. Attended and participated in group. Endorsed feeling so sadness and hopelessness today. Ate meals. Makes appropriate requests of staff. No SI/SIB thoughts today. Able to contract for safety while on the unit. Offers not other concerns.

## 2023-05-29 NOTE — PLAN OF CARE
Problem: Adult Behavioral Health Plan of Care  Goal: Adheres to Safety Considerations for Self and Others  Outcome: Progressing     Problem: Suicidal Behavior  Goal: Suicidal Behavior is Absent or Managed  Outcome: Progressing     Pt slept through the night without distress. Pt reported no pain or discomfort. No problems with behavior. No concerns reported by pt. Pt slept 6.5 hours.  Staff will continue to monitor.

## 2023-05-29 NOTE — CARE PLAN
"   05/28/23 1900   Group Therapy Session   Group Attendance attended group session   Time Session Began 1615   Time Session Ended 1700   Total Time (minutes) 45   Total # Attendees 4   Group Type expressive therapy   Group Topic Covered emotions/expression   Patient Response/Contribution cooperative with task   Pt participated in a mindfulness based Art Therapy group this evening. Pts were encouraged to choose a personal intention and to create artwork while focused on this intention.   Goals of directive: emotional expression, emotional regulation, media exploration, mindfulness  Pt was an engaged participant, focused on task for the full duration of group. Pt finished artwork and briefly verbally processed with author and group. Pt chose the personal intention of \"serenity\" and created a drawing of his parent's house and surrounding landscape which he described as being in a calming natural setting with lake and woods nearby.  Pts mood was calm, pleasant participant.  "

## 2023-05-29 NOTE — CARE PLAN
05/29/23 1448   Group Therapy Session   Group Attendance attended group session   Total Time (minutes) 90   Total # Attendees 3,3   Group Type task skill   Group Topic Covered coping skills/lifestyle management;leisure exploration/use of leisure time;structured socialization   Group Session Detail OT clinic x2   Patient Response/Contribution able to recall/repeat info presented;cooperative with task;organized     Pt missed the morning topic group, though attended the morning OT clinic group and the afternoon leisure group.  Pt engaged in check-in with writer, discussed having done well for the last 2 years (no hospitalizations), though recently having a significant increase in anxiety and suicidal ideation.  Pt reports living in his group home is going well, and is happy to be able to have his therapy dog there (being taken care of by housemates and staff currently).  Discussed hobbies and interests, though acknowledges having a lot of time on his hands with limited structure.    Pt did well in group, demonstrates good focus, task planning, organization, and enjoys doing detailed work.  Pt showed writer detailed coloring he's working on outside of group, and was given a few paint by sticker activities to do outside of group time for structure.

## 2023-05-29 NOTE — PROGRESS NOTES
Brief Medicine Follow Up Note    Following up regarding diabetes management.     Today's vital signs, medications, and nursing notes were reviewed. Labs reviewed most recent serum and urine laboratories.     /86 (BP Location: Right arm, Patient Position: Sitting, Cuff Size: Adult Regular)   Pulse 105   Temp 98.5  F (36.9  C) (Temporal)   Resp 16   Wt 107.9 kg (237 lb 12.8 oz)   LMP  (LMP Unknown)   SpO2 96%   BMI 39.27 kg/m      A/P:  Type 2 Diabetes Mellitus, insulin-dependent, poorly controlled  A1c on 5/5/23 9/1%. Please see my colleague's most recent consult note dated 5/27/23 (Yolie Rodriguez PA-C) for diabetes management. Pt was continued on PTA Semaglutide (weekly, Tuesdays), Lantus 15 units qAM before breakfast. The pt was initiated on low sliding scale insulin on 5/27/23. Review of BGs since initiation of SSI show improved response compared to prior. However, BGs still above goal (130s-230s).   - Will increase sliding scale insulin from low to medium  - Continue Semaglutide and Lantus as ordered  - BG checks TID AC, qHS, and at 0200 (while on insulin)  - Hypoglycemia protocol    Medicine will continue to follow along peripherally for BG management. Recommendations relayed to primary team via this progress note.      Pool Mclean PA-C  [facility]  Contact information available via [facility] Paging/Directory

## 2023-05-29 NOTE — PLAN OF CARE
Problem: Suicide Risk  Goal: Absence of Self-Harm  Outcome: Progressing     Milieu Participation:Behavior: Pt calm, cooperative with staff, and visible on the unit. Attended group making a name sign for room. Pt stated he does feel suicidal and currently has voices telling him to kill himself. Pt contracted for safety and will reach out to staff if he feels unsafe. Listening to music and watching TV are affective distractions while in room, and going to groups and speaking with staff and peers also are said to be beneficial. Endorsed depression and anxiety and rated them 8, and 5, out of 10 respectively. Pt stated the current medications do make him feel tired during the day.Medication compliant. Blood glucose checks 239, and 189 receiving Novolog 1 unit total.  Ate 100% of dinner.     Safety: status 15   Aggression/agitation/HI: denies, exhibited safe behavior  Affect: blunted Mood: calm    Physical Complaints/Issues: denies    PRN Med: Nicorette gum x 3  Medication AE: pt mentioned that he felt the medications make him tired during the day.    I & O: eating and drinking well 100%  Sleep: denies concerns  LBM: denies concerns  ADLs: independent  Visits/calls: none    Vitals:     BP: 130/86  Temp: 98.5  F (36.9  C)  Temp src: Temporal  Pulse: 105  Resp: 16  SpO2: 96 % (05/28 1700-05/28 2150)   Denies pain    Plan of Care Reviewed With: patient

## 2023-05-30 ENCOUNTER — MEDICAL CORRESPONDENCE (OUTPATIENT)
Dept: BEHAVIORAL HEALTH | Facility: CLINIC | Age: 33
End: 2023-05-30
Payer: MEDICARE

## 2023-05-30 PROBLEM — F41.1 GAD (GENERALIZED ANXIETY DISORDER): Status: ACTIVE | Noted: 2020-11-16

## 2023-05-30 PROBLEM — F43.10 PTSD (POST-TRAUMATIC STRESS DISORDER): Status: ACTIVE | Noted: 2017-06-30

## 2023-05-30 LAB
ATRIAL RATE - MUSE: 111 BPM
DIASTOLIC BLOOD PRESSURE - MUSE: NORMAL MMHG
GLUCOSE BLDC GLUCOMTR-MCNC: 188 MG/DL (ref 70–99)
GLUCOSE BLDC GLUCOMTR-MCNC: 228 MG/DL (ref 70–99)
GLUCOSE BLDC GLUCOMTR-MCNC: 269 MG/DL (ref 70–99)
GLUCOSE BLDC GLUCOMTR-MCNC: 300 MG/DL (ref 70–99)
INTERPRETATION ECG - MUSE: NORMAL
P AXIS - MUSE: 42 DEGREES
PR INTERVAL - MUSE: 154 MS
QRS DURATION - MUSE: 96 MS
QT - MUSE: 350 MS
QTC - MUSE: 476 MS
R AXIS - MUSE: 16 DEGREES
SYSTOLIC BLOOD PRESSURE - MUSE: NORMAL MMHG
T AXIS - MUSE: 48 DEGREES
VENTRICULAR RATE- MUSE: 111 BPM

## 2023-05-30 PROCEDURE — 250N000013 HC RX MED GY IP 250 OP 250 PS 637: Performed by: PSYCHIATRY & NEUROLOGY

## 2023-05-30 PROCEDURE — 124N000002 HC R&B MH UMMC

## 2023-05-30 PROCEDURE — G0177 OPPS/PHP; TRAIN & EDUC SERV: HCPCS

## 2023-05-30 PROCEDURE — 99233 SBSQ HOSP IP/OBS HIGH 50: CPT | Performed by: PSYCHIATRY & NEUROLOGY

## 2023-05-30 RX ORDER — OLANZAPINE 15 MG/1
30 TABLET ORAL AT BEDTIME
Status: DISCONTINUED | OUTPATIENT
Start: 2023-05-30 | End: 2023-05-31

## 2023-05-30 RX ORDER — BUSPIRONE HYDROCHLORIDE 10 MG/1
10 TABLET ORAL 3 TIMES DAILY
Status: DISCONTINUED | OUTPATIENT
Start: 2023-05-30 | End: 2023-06-09 | Stop reason: HOSPADM

## 2023-05-30 RX ORDER — OLANZAPINE 10 MG/1
10 TABLET ORAL 2 TIMES DAILY
Status: DISCONTINUED | OUTPATIENT
Start: 2023-05-30 | End: 2023-05-30

## 2023-05-30 RX ADMIN — BUSPIRONE HYDROCHLORIDE 10 MG: 10 TABLET ORAL at 20:16

## 2023-05-30 RX ADMIN — AMLODIPINE BESYLATE 10 MG: 10 TABLET ORAL at 07:55

## 2023-05-30 RX ADMIN — ROSUVASTATIN CALCIUM 10 MG: 10 TABLET, FILM COATED ORAL at 07:55

## 2023-05-30 RX ADMIN — GABAPENTIN 1200 MG: 600 TABLET, FILM COATED ORAL at 07:56

## 2023-05-30 RX ADMIN — GABAPENTIN 1200 MG: 600 TABLET, FILM COATED ORAL at 20:16

## 2023-05-30 RX ADMIN — NICOTINE POLACRILEX 4 MG: 4 GUM, CHEWING BUCCAL at 16:52

## 2023-05-30 RX ADMIN — DOXYCYCLINE HYCLATE 100 MG: 50 CAPSULE ORAL at 20:16

## 2023-05-30 RX ADMIN — LAMOTRIGINE 25 MG: 25 TABLET ORAL at 07:56

## 2023-05-30 RX ADMIN — SENNOSIDES AND DOCUSATE SODIUM 1 TABLET: 50; 8.6 TABLET ORAL at 10:02

## 2023-05-30 RX ADMIN — NICOTINE POLACRILEX 4 MG: 4 GUM, CHEWING BUCCAL at 09:38

## 2023-05-30 RX ADMIN — CLONAZEPAM 1 MG: 1 TABLET ORAL at 07:56

## 2023-05-30 RX ADMIN — NICOTINE POLACRILEX 2 MG: 2 GUM, CHEWING ORAL at 21:26

## 2023-05-30 RX ADMIN — OMEPRAZOLE 20 MG: 20 CAPSULE, DELAYED RELEASE ORAL at 07:56

## 2023-05-30 RX ADMIN — CLONAZEPAM 1 MG: 1 TABLET ORAL at 20:16

## 2023-05-30 RX ADMIN — PRAZOSIN HYDROCHLORIDE 1 MG: 1 CAPSULE ORAL at 21:54

## 2023-05-30 RX ADMIN — DOXYCYCLINE HYCLATE 100 MG: 50 CAPSULE ORAL at 07:55

## 2023-05-30 RX ADMIN — NICOTINE POLACRILEX 4 MG: 4 GUM, CHEWING BUCCAL at 07:59

## 2023-05-30 RX ADMIN — NICOTINE POLACRILEX 4 MG: 4 GUM, CHEWING BUCCAL at 12:37

## 2023-05-30 RX ADMIN — OLANZAPINE 30 MG: 15 TABLET, FILM COATED ORAL at 21:26

## 2023-05-30 RX ADMIN — TRETINOIN: 0.5 CREAM TOPICAL at 21:28

## 2023-05-30 RX ADMIN — DEXTROAMPHETAMINE SULFATE, DEXTROAMPHETAMINE SACCHARATE, AMPHETAMINE SULFATE AND AMPHETAMINE ASPARTATE 20 MG: 5; 5; 5; 5 CAPSULE, EXTENDED RELEASE ORAL at 07:55

## 2023-05-30 RX ADMIN — NICOTINE 1 PATCH: 21 PATCH, EXTENDED RELEASE TRANSDERMAL at 07:56

## 2023-05-30 RX ADMIN — INSULIN GLARGINE 15 UNITS: 100 INJECTION, SOLUTION SUBCUTANEOUS at 07:57

## 2023-05-30 RX ADMIN — ACETAMINOPHEN 650 MG: 325 TABLET, FILM COATED ORAL at 18:44

## 2023-05-30 RX ADMIN — NICOTINE POLACRILEX 2 MG: 2 GUM, CHEWING ORAL at 18:19

## 2023-05-30 RX ADMIN — GABAPENTIN 1200 MG: 600 TABLET, FILM COATED ORAL at 16:03

## 2023-05-30 RX ADMIN — INSULIN ASPART 4 UNITS: 100 INJECTION, SOLUTION INTRAVENOUS; SUBCUTANEOUS at 17:04

## 2023-05-30 ASSESSMENT — ACTIVITIES OF DAILY LIVING (ADL)
ADLS_ACUITY_SCORE: 45
HYGIENE/GROOMING: INDEPENDENT
ADLS_ACUITY_SCORE: 45
ADLS_ACUITY_SCORE: 45
DRESS: INDEPENDENT
ADLS_ACUITY_SCORE: 45
ORAL_HYGIENE: INDEPENDENT
ADLS_ACUITY_SCORE: 45

## 2023-05-30 NOTE — CARE PLAN
"Occupational Therapy Group Note:     05/30/23 1554   Group Therapy Session   Group Attendance attended group session   Time Session Began 1120   Time Session Ended 1215   Total Time (minutes) 55   Total # Attendees 2   Group Type task skill   Group Topic Covered coping skills/lifestyle management;leisure exploration/use of leisure time;relaxation techniques;structured socialization   Group Session Detail OT Clinic Group   Patient Response/Contribution confronted peers appropriately;cooperative with task;listened actively   Patient Participation Detail Pt actively participated in occupational therapy clinic to facilitate coping skill exploration, creative expression within personally meaningful activities, and clinical observation of social, cognitive, and kinesthetic performance skills. Patient requested to work on either scratch art or paint by sticker this date. Patient reported that these activities both helped yesterday when he was \"having a bad day.\" Patient reports that these activities help to take his mind off things and help him to feel more relaxed. Patient worked on scratch art for full duration of group. Appeared focused and engaged. Worked at an appropriate pace; unable to reach task completion. Patient voiced plan to continue work on task in future session. Patient made multiple musical song requests throughout group. Engaged in light and superficial conversation with writer and peer. Positive and pleasant. Affect: congruent. Mood: calm, cooperative.       "

## 2023-05-30 NOTE — PLAN OF CARE
Problem: Sleep Disturbance  Goal: Adequate Sleep/Rest  Outcome: Progressing   Goal Outcome Evaluation:    Patient appeared to sleep 7 hours this night shift.  No prns or snacks given or requested.  No concerns were reported or noted.  Appears comfortable and no self-injury attempts reported.

## 2023-05-30 NOTE — PLAN OF CARE
Problem: Suicide Risk  Goal: Absence of Self-Harm  Outcome: Progressing   Goal Outcome Evaluation:       Patient has been up since the beginning of the shift. He complains of increased anxiety but feel his medications are helping. Requested PRN medication for constipation. Has been attending groups today. Indicated that discharge is not an option for him today. He has been medication compliant and adherent to his BG monitoring. Eating and drinking in good amounts. Denies any physical concerns. Denies any SI/SIB today. He has been keeping a small abrasion on the L forarm clean/dry and intact. Working on coping skills. Pleasant with staff and peers today.

## 2023-05-30 NOTE — PROGRESS NOTES
05/30/23 1123   Individualization/Patient Specific Goals   Patient Personal Strengths ability to maintain sobriety;community support;coping skills;expressive of emotions;expressive of needs;family/social support;insight into illness/situation;intellectual cognitive skills;medication/treatment adherence;motivated for recovery;motivated for treatment;resilient;resourceful;self-awareness;stable living environment   Patient Vulnerabilities adverse childhood experience(s);occupational insecurity;poor physical health;substance abuse/addiction;traumatic event   Interprofessional Rounds   Participants CTC;nursing;psychiatrist   Behavioral Team Discussion   Participants Dr. Villeda, Dr. Andrade, nursing, CTCs   Anticipated length of stay 7 days   Continued Stay Criteria/Rationale Needs continued medication management and symptom stabilization before discharge   Plan Psychiatrist will continue medication management. Patient will continue attending groups. CTC will continue care coordination with community providers and after care planning.   Anticipated Discharge Disposition group home   PRECAUTIONS AND SAFETY    Behavioral Orders   Procedures     Code 1 - Restrict to Unit     Routine Programming     As clinically indicated     Status 15     Every 15 minutes.     Suicide precautions     Patients on Suicide Precautions should have a Combination Diet ordered that includes a Diet selection(s) AND a Behavioral Tray selection for Safe Tray - with utensils, or Safe Tray - NO utensils         Safety  Safety WDL: .WDL except, safety factors  Patient Location: [facility]  Observed Behavior: other (see comments) (anxious and exprese's having thoughts of harming himself)  Safety Measures: environmental rounds completed, safety plan mutually developed, safety rounds completed  Diversional Activity: art work, music, television  Suicidality: Status 15

## 2023-05-30 NOTE — PLAN OF CARE
Problem: Suicidal Behavior  Goal: Suicidal Behavior is Absent or Managed  Outcome: Progressing     Problem: Sleep Disturbance  Goal: Adequate Sleep/Rest  Outcome: Progressing   Goal Outcome Evaluation:    Around 1700  patient  requested for 12 hour Notice of Intent to Leave. Writer Called provider and updated with patient intent to leave. Provider said, to let patient knows if he wants to leave, he will be put on a 72 hours hold.  Patient agreed to be put on a 72 hours hold. Provider wrote an order for 72 hours hold. Copy of Emergency Hospitalization Hold  was given to patient.     Patient blood sugar at 1700 was 228, patient received coverage. Ozempic injection administered at 1600. Patient took all scheduled medications with zero problem accept for 1600 buspirone. PRN tylenol 650 mg given for back pain was helpful. Bed time blood sugar was 300,  coverage given per sliding scale.  Affect flat, mood was calmed. Patient was social with peers. Patient denied SI, SIB, and  hearing voices. No concern with behavior on this shift.
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
## 2023-05-30 NOTE — PROGRESS NOTES
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 psychosis  Zyprexa 10 mg daily as needed p.o. or IM for psychosis, agitation and aggression  Prazosin 1 mg nightly for nightmares of PTSD  Depo testosterone injection 20 mg subcu weekly, first dose on 5/27/2023  If given IM, it  has to be given in glluleus   Lamictal 25 mg daily for mood stabilization, will titrate  Klonopin 1 mg twice daily for anxiety  Neurontin 1200 mg 3 times daily for anxiety and mood stabilization  Adderall XR 20 mg daily for ADHD  Melatonin 3 mg nightly as needed for pain  Ibuprofen 600 mg 4 times daily as needed for pain  Nicotine gum 2 mg q 1 hour prn for smoking secession  Kalyn-Colace 1 tablet twice daily as needed for constipation  Continue other nonpsychiatric medications  We discussed side effects, benefits and alternative treatments and patient agrees .  Legal: 72 hour hold for safety, stabilization medication management, auditory hallucinations of command type telling him to kill himself  Suicide precautions  Full code   will collect collateral information and make outpatient referrals  Staff to provide emotional support and redirect as needed  Patient encouraged to attend groups  Lab results: Reviewed personally, glucose monitoring 4 times  daily before meals and at bedtime  Consultation: According to patient symptom report    Risk Assessment: Auditory hallucinations of command type telling patient to kill himself    Coordination of Care:   Patient seen, medical record reviewed, care coordinated with the team.    Total time:  More than 50 minutes spent on this visit with more than 50% time  spent on coordination of care with staff, team meeting,issuing hold, reviewing medical record, educating patient about treatment options, side effects and benefits and alternative treatments for medications, providing supportive therapy regarding above issues,entering orders and preparing documentation for the visit.    This document is created with the help of Dragon dictation system.  All grammatical/typing errors or context distortion are unintentional and inherent to software.    Gillian Andrade MD       Re-Certification I certify that the inpatient psychiatric facility services furnished since the previous certification were, and continue to be, medically necessary for, either, treatment which could reasonably be expected to improve the patient s condition or diagnostic study and that the hospital records indicate that the services furnished were, either, intensive treatment services, admission and related services necessary for diagnostic study, or equivalent services.     I certify that the patient continues to need, on a daily basis, active treatment furnished directly by or requiring the supervision of inpatient psychiatric facility personnel.   I estimate TBD days of hospitalization is necessary for proper treatment of the patient. My plans for post-hospital care for this patient are : Medications, appointments     Gillian Andrade MD

## 2023-05-30 NOTE — CARE PLAN
"Occupational Therapy Group Note:     05/30/23 1527   Group Therapy Session   Group Attendance attended group session   Time Session Began 1015   Time Session Ended 1110   Total Time (minutes) 45   Total # Attendees 3   Group Type psychoeducation   Group Topic Covered balanced lifestyle;other (see comments)  (Goal development)   Group Session Detail OT Topic Group: Goal Formation   Patient Response/Contribution confronted peers appropriately;cooperative with task;listened actively   Patient Participation Detail Verbal topic group discussing goals was facilitated in order to provide a rudimentary understanding of: what are goals, how to formulate goals, and the therapeutic benefit for setting goals. The acronym (SMART) was utilized for goal development: specific, measurable, attainable, relevant, and timely. Patient appeared motivated to engage in group this date. Patient reported feeling \"content\" at beginning of group. Patient demonstrated active listening throughout group; asking appropriate clarifying questions about information presented. Patient was able to develop goals regarding: implementing meditation into routine and to take prescribed medications as directed. Patient was receptive to feedback and suggestions provided by writer. Patient worked collaboratively with writer to develop goals this date. Patient reported a positive response to group, stating, \"That was a good group\" at end of session. Affect: blunted. Mood: calm, cooperative, engaged, pleasant.        "

## 2023-05-30 NOTE — PROGRESS NOTES
Brief Medicine Follow Up Note    Following up regarding diabetes management.     Today's vital signs, medications, and nursing notes were reviewed. Labs reviewed most recent serum and urine laboratories.     BP (!) 150/91 (BP Location: Right arm, Patient Position: Sitting, Cuff Size: Adult Large)   Pulse 110   Temp 98  F (36.7  C) (Oral)   Resp 18   Wt 108.6 kg (239 lb 6.4 oz)   LMP  (LMP Unknown)   SpO2 98%   BMI 39.53 kg/m      A/P:  Type 2 Diabetes Mellitus, insulin-dependent, poorly controlled  A1c on 5/5/23 9/1%. Please see my most recent consult note dated 5/27/23 (Yolie Rodriguez PA-C) for full details regarding diabetes management. Pt was continued on PTA Semaglutide (weekly, Tuesdays), Lantus 15 units qAM before breakfast. The pt was initiated on low sliding scale insulin on 5/27/23. Review of BGs since initiation of SSI show improved response compared to prior. However, BGs still above goal (130s-260s).   - Will increase sliding scale insulin from medium to high  - Continue Semaglutide and Lantus as ordered  - BG checks TID AC, qHS, and at 0200 (while on insulin)  - Hypoglycemia protocol    Medicine will continue to follow along peripherally for BG management. Recommendations relayed to primary team via this progress note.      Pool Mclean PA-C  [facility]  Contact information available via [facility] Paging/Directory

## 2023-05-30 NOTE — PLAN OF CARE
Assessment/Intervention/Current Symptoms and Care Coordination:  Met with team. Met with patient. Reviewed chart. Left voicemail with patient's psychotherapist (Joshua at [employer], VAL signed) and [facility] worker (Deena at [employer], VAL signed).      Discharge Plan or Goal:  Discharge back to [facility] in [place].      Barriers to Discharge:  Discharge pending symptom stabilization and medication management.      Referral Status:  Referral pending for psychiatrist at [facility]     Legal Status:  Voluntary    Contacts:  : Vu at [facility], [phone] (VAL signed)   [facility]: Deena at [facility], [phone] (VAL signed)   [facility] Director and Nurse: Domenica [phone] (VAL signed)   [facility] worker: Pretty Guzman at [employer], [phone] (VAL signed)   Psychotherapist: Joshua at [employer], [phone] (VAL signed)      Upcoming Meetings and Dates/Important Information and next steps:  Waiting for psychiatrist appointment

## 2023-05-31 LAB
GLUCOSE BLDC GLUCOMTR-MCNC: 176 MG/DL (ref 70–99)
GLUCOSE BLDC GLUCOMTR-MCNC: 193 MG/DL (ref 70–99)
GLUCOSE BLDC GLUCOMTR-MCNC: 221 MG/DL (ref 70–99)
GLUCOSE BLDC GLUCOMTR-MCNC: 286 MG/DL (ref 70–99)

## 2023-05-31 PROCEDURE — 250N000013 HC RX MED GY IP 250 OP 250 PS 637: Performed by: PSYCHIATRY & NEUROLOGY

## 2023-05-31 PROCEDURE — 99233 SBSQ HOSP IP/OBS HIGH 50: CPT | Performed by: PSYCHIATRY & NEUROLOGY

## 2023-05-31 PROCEDURE — 124N000002 HC R&B MH UMMC

## 2023-05-31 PROCEDURE — 250N000011 HC RX IP 250 OP 636: Performed by: PSYCHIATRY & NEUROLOGY

## 2023-05-31 RX ORDER — QUETIAPINE FUMARATE 100 MG/1
100 TABLET, FILM COATED ORAL AT BEDTIME
Status: DISCONTINUED | OUTPATIENT
Start: 2023-05-31 | End: 2023-06-05

## 2023-05-31 RX ORDER — KETOROLAC TROMETHAMINE 15 MG/ML
15 INJECTION, SOLUTION INTRAMUSCULAR; INTRAVENOUS ONCE
Status: COMPLETED | OUTPATIENT
Start: 2023-05-31 | End: 2023-05-31

## 2023-05-31 RX ORDER — OLANZAPINE 20 MG/1
20 TABLET ORAL AT BEDTIME
Status: DISCONTINUED | OUTPATIENT
Start: 2023-05-31 | End: 2023-06-05

## 2023-05-31 RX ADMIN — INSULIN ASPART 1 UNITS: 100 INJECTION, SOLUTION INTRAVENOUS; SUBCUTANEOUS at 17:57

## 2023-05-31 RX ADMIN — BUSPIRONE HYDROCHLORIDE 10 MG: 10 TABLET ORAL at 19:49

## 2023-05-31 RX ADMIN — LAMOTRIGINE 25 MG: 25 TABLET ORAL at 08:16

## 2023-05-31 RX ADMIN — AMLODIPINE BESYLATE 10 MG: 10 TABLET ORAL at 08:17

## 2023-05-31 RX ADMIN — GABAPENTIN 1200 MG: 600 TABLET, FILM COATED ORAL at 19:48

## 2023-05-31 RX ADMIN — ACETAMINOPHEN 650 MG: 325 TABLET, FILM COATED ORAL at 10:41

## 2023-05-31 RX ADMIN — GABAPENTIN 1200 MG: 600 TABLET, FILM COATED ORAL at 13:38

## 2023-05-31 RX ADMIN — INSULIN ASPART 3 UNITS: 100 INJECTION, SOLUTION INTRAVENOUS; SUBCUTANEOUS at 12:14

## 2023-05-31 RX ADMIN — ACETAMINOPHEN 650 MG: 325 TABLET, FILM COATED ORAL at 19:48

## 2023-05-31 RX ADMIN — SENNOSIDES AND DOCUSATE SODIUM 1 TABLET: 50; 8.6 TABLET ORAL at 21:55

## 2023-05-31 RX ADMIN — NICOTINE POLACRILEX 2 MG: 2 GUM, CHEWING ORAL at 08:58

## 2023-05-31 RX ADMIN — NICOTINE POLACRILEX 2 MG: 2 GUM, CHEWING ORAL at 11:23

## 2023-05-31 RX ADMIN — TRETINOIN: 0.5 CREAM TOPICAL at 21:56

## 2023-05-31 RX ADMIN — CLONAZEPAM 1 MG: 1 TABLET ORAL at 19:49

## 2023-05-31 RX ADMIN — OMEPRAZOLE 20 MG: 20 CAPSULE, DELAYED RELEASE ORAL at 08:16

## 2023-05-31 RX ADMIN — NICOTINE 1 PATCH: 21 PATCH, EXTENDED RELEASE TRANSDERMAL at 08:36

## 2023-05-31 RX ADMIN — DOXYCYCLINE HYCLATE 100 MG: 50 CAPSULE ORAL at 19:48

## 2023-05-31 RX ADMIN — ROSUVASTATIN CALCIUM 10 MG: 10 TABLET, FILM COATED ORAL at 08:17

## 2023-05-31 RX ADMIN — NICOTINE POLACRILEX 2 MG: 2 GUM, CHEWING ORAL at 17:10

## 2023-05-31 RX ADMIN — INSULIN ASPART 4 UNITS: 100 INJECTION, SOLUTION INTRAVENOUS; SUBCUTANEOUS at 08:17

## 2023-05-31 RX ADMIN — DEXTROAMPHETAMINE SULFATE, DEXTROAMPHETAMINE SACCHARATE, AMPHETAMINE SULFATE AND AMPHETAMINE ASPARTATE 20 MG: 5; 5; 5; 5 CAPSULE, EXTENDED RELEASE ORAL at 08:16

## 2023-05-31 RX ADMIN — QUETIAPINE FUMARATE 100 MG: 100 TABLET ORAL at 21:46

## 2023-05-31 RX ADMIN — INSULIN GLARGINE 15 UNITS: 100 INJECTION, SOLUTION SUBCUTANEOUS at 08:34

## 2023-05-31 RX ADMIN — GABAPENTIN 1200 MG: 600 TABLET, FILM COATED ORAL at 08:16

## 2023-05-31 RX ADMIN — BENZOYL PEROXIDE: 50 LOTION TOPICAL at 08:34

## 2023-05-31 RX ADMIN — NICOTINE POLACRILEX 2 MG: 2 GUM, CHEWING ORAL at 07:47

## 2023-05-31 RX ADMIN — KETOROLAC TROMETHAMINE 15 MG: 15 INJECTION, SOLUTION INTRAMUSCULAR; INTRAVENOUS at 16:52

## 2023-05-31 RX ADMIN — PRAZOSIN HYDROCHLORIDE 1 MG: 1 CAPSULE ORAL at 21:46

## 2023-05-31 RX ADMIN — NICOTINE POLACRILEX 2 MG: 2 GUM, CHEWING ORAL at 19:54

## 2023-05-31 RX ADMIN — HYDROXYZINE HYDROCHLORIDE 25 MG: 25 TABLET, FILM COATED ORAL at 19:49

## 2023-05-31 RX ADMIN — NICOTINE POLACRILEX 2 MG: 2 GUM, CHEWING ORAL at 13:38

## 2023-05-31 RX ADMIN — OLANZAPINE 20 MG: 20 TABLET, FILM COATED ORAL at 21:46

## 2023-05-31 RX ADMIN — BUSPIRONE HYDROCHLORIDE 10 MG: 10 TABLET ORAL at 08:16

## 2023-05-31 RX ADMIN — BUSPIRONE HYDROCHLORIDE 10 MG: 10 TABLET ORAL at 13:38

## 2023-05-31 RX ADMIN — DOXYCYCLINE HYCLATE 100 MG: 50 CAPSULE ORAL at 08:15

## 2023-05-31 RX ADMIN — CLONAZEPAM 1 MG: 1 TABLET ORAL at 08:16

## 2023-05-31 ASSESSMENT — ACTIVITIES OF DAILY LIVING (ADL)
ADLS_ACUITY_SCORE: 45
LAUNDRY: UNABLE TO COMPLETE
ADLS_ACUITY_SCORE: 45
DRESS: SCRUBS (BEHAVIORAL HEALTH)
ADLS_ACUITY_SCORE: 45
ADLS_ACUITY_SCORE: 45
ORAL_HYGIENE: INDEPENDENT
ADLS_ACUITY_SCORE: 45
HYGIENE/GROOMING: INDEPENDENT
ADLS_ACUITY_SCORE: 45

## 2023-05-31 NOTE — PLAN OF CARE
Problem: Sleep Disturbance  Goal: Adequate Sleep/Rest  Outcome: Progressing   Goal Outcome Evaluation:    Patient appeared to sleep 6.75 hours this night shift.  No prns or snacks given or requested. Up to use the bathroom once during night shift.  No concerns were reported or noted.

## 2023-05-31 NOTE — PLAN OF CARE
Assessment/Intervention/Current Symptoms and Care Coordination:  Met with team. Met with patient. Reviewed chart. Called [facility] to clarify 's name - chart reflects accurate name (Cristy). Patient given Cristy's phone number.      Discharge Plan or Goal:  Discharge back to [facility] in [place].      Barriers to Discharge:  Discharge pending symptom stabilization and medication management. Pre-petition paperwork started.     Referral Status:  No referrals sent, need for referrals being assessed     Legal Status:  72HH    Pre-petition paperwork started for [facility]    Contacts:  : Cristy at [facility], [phone] (VAL signed)   [facility]: Deena at [employer], [phone] (VAL signed)   [facility] Director and Nurse: Domenica [phone] (VAL signed)   [facility] worker: Pretty Guzman at [employer], [phone] (VAL signed)   Psychotherapist: Joshua at [employer], [phone] (VAL signed)      Upcoming Meetings and Dates/Important Information and next steps:  Psychiatrist follow-up scheduled

## 2023-05-31 NOTE — PLAN OF CARE
"Goal Outcome Evaluation:  Problem: Plan of Care - These are the overarching goals to be used throughout the patient stay.    Goal: Optimal Comfort and Wellbeing  Outcome: Progressing     Patient is alert and oriented to person and place with a flat, guarded affect. Patient is calm and cooperative with vitals check and medication administration. Patient was present in  milieu but not talkative and not social with peers. Patient is unclean and has some body odor. Patient complained of lower back pain rated 7/10 and requested something stronger (toradol) for his pain. Writer told him that Toradol is not among his medication list, and encouraged patient to let the provider know when they meet. He was given a hot pack and 650 mg of Tylenol to manage the pain. Provider order Toradol 15mg, and approved patient to wear his personal short due to patient being hot. Patient ate 100% of his breakfast and lunch without issues. Patient told writer that \" am a bit suicidal but am able to manage it.\" Patient contracted for safety, and denied all psych symptoms of hallucinations, anxiety and depression.     Prn tylenol 650mg was given for pain   Patient morning BG was 221 and afternoon was 193.   "

## 2023-06-01 ENCOUNTER — MEDICAL CORRESPONDENCE (OUTPATIENT)
Dept: HEALTH INFORMATION MANAGEMENT | Facility: CLINIC | Age: 33
End: 2023-06-01

## 2023-06-01 LAB
GLUCOSE BLDC GLUCOMTR-MCNC: 156 MG/DL (ref 70–99)
GLUCOSE BLDC GLUCOMTR-MCNC: 206 MG/DL (ref 70–99)
GLUCOSE BLDC GLUCOMTR-MCNC: 277 MG/DL (ref 70–99)
GLUCOSE BLDC GLUCOMTR-MCNC: 295 MG/DL (ref 70–99)

## 2023-06-01 PROCEDURE — 250N000011 HC RX IP 250 OP 636: Performed by: PSYCHIATRY & NEUROLOGY

## 2023-06-01 PROCEDURE — 99232 SBSQ HOSP IP/OBS MODERATE 35: CPT | Performed by: PSYCHIATRY & NEUROLOGY

## 2023-06-01 PROCEDURE — 250N000013 HC RX MED GY IP 250 OP 250 PS 637: Performed by: PSYCHIATRY & NEUROLOGY

## 2023-06-01 PROCEDURE — H2032 ACTIVITY THERAPY, PER 15 MIN: HCPCS

## 2023-06-01 PROCEDURE — 124N000002 HC R&B MH UMMC

## 2023-06-01 RX ORDER — CLONAZEPAM 0.5 MG/1
0.5 TABLET ORAL 2 TIMES DAILY
Status: DISCONTINUED | OUTPATIENT
Start: 2023-06-01 | End: 2023-06-05

## 2023-06-01 RX ADMIN — NICOTINE POLACRILEX 2 MG: 2 GUM, CHEWING ORAL at 21:36

## 2023-06-01 RX ADMIN — AMLODIPINE BESYLATE 10 MG: 10 TABLET ORAL at 08:39

## 2023-06-01 RX ADMIN — ROSUVASTATIN CALCIUM 10 MG: 10 TABLET, FILM COATED ORAL at 08:39

## 2023-06-01 RX ADMIN — NICOTINE POLACRILEX 2 MG: 2 GUM, CHEWING ORAL at 09:00

## 2023-06-01 RX ADMIN — PRAZOSIN HYDROCHLORIDE 1 MG: 1 CAPSULE ORAL at 21:36

## 2023-06-01 RX ADMIN — GABAPENTIN 1200 MG: 600 TABLET, FILM COATED ORAL at 14:30

## 2023-06-01 RX ADMIN — INSULIN ASPART 3 UNITS: 100 INJECTION, SOLUTION INTRAVENOUS; SUBCUTANEOUS at 12:30

## 2023-06-01 RX ADMIN — INSULIN ASPART 1 UNITS: 100 INJECTION, SOLUTION INTRAVENOUS; SUBCUTANEOUS at 18:23

## 2023-06-01 RX ADMIN — DOXYCYCLINE HYCLATE 100 MG: 50 CAPSULE ORAL at 08:39

## 2023-06-01 RX ADMIN — NICOTINE POLACRILEX 2 MG: 2 GUM, CHEWING ORAL at 10:36

## 2023-06-01 RX ADMIN — INSULIN GLARGINE 15 UNITS: 100 INJECTION, SOLUTION SUBCUTANEOUS at 08:40

## 2023-06-01 RX ADMIN — NICOTINE POLACRILEX 2 MG: 2 GUM, CHEWING ORAL at 14:31

## 2023-06-01 RX ADMIN — QUETIAPINE FUMARATE 100 MG: 100 TABLET ORAL at 21:36

## 2023-06-01 RX ADMIN — NICOTINE POLACRILEX 2 MG: 2 GUM, CHEWING ORAL at 17:15

## 2023-06-01 RX ADMIN — BUSPIRONE HYDROCHLORIDE 10 MG: 10 TABLET ORAL at 08:39

## 2023-06-01 RX ADMIN — ONDANSETRON 4 MG: 4 TABLET, ORALLY DISINTEGRATING ORAL at 10:36

## 2023-06-01 RX ADMIN — CLONAZEPAM 0.5 MG: 0.5 TABLET ORAL at 08:38

## 2023-06-01 RX ADMIN — DOXYCYCLINE HYCLATE 100 MG: 50 CAPSULE ORAL at 19:59

## 2023-06-01 RX ADMIN — NICOTINE 1 PATCH: 21 PATCH, EXTENDED RELEASE TRANSDERMAL at 08:38

## 2023-06-01 RX ADMIN — CLONAZEPAM 0.5 MG: 0.5 TABLET ORAL at 19:59

## 2023-06-01 RX ADMIN — OLANZAPINE 20 MG: 20 TABLET, FILM COATED ORAL at 21:36

## 2023-06-01 RX ADMIN — DEXTROAMPHETAMINE SULFATE, DEXTROAMPHETAMINE SACCHARATE, AMPHETAMINE SULFATE AND AMPHETAMINE ASPARTATE 20 MG: 5; 5; 5; 5 CAPSULE, EXTENDED RELEASE ORAL at 08:38

## 2023-06-01 RX ADMIN — BUSPIRONE HYDROCHLORIDE 10 MG: 10 TABLET ORAL at 14:30

## 2023-06-01 RX ADMIN — GABAPENTIN 1200 MG: 600 TABLET, FILM COATED ORAL at 08:39

## 2023-06-01 RX ADMIN — LAMOTRIGINE 25 MG: 25 TABLET ORAL at 08:39

## 2023-06-01 RX ADMIN — IBUPROFEN 600 MG: 600 TABLET, FILM COATED ORAL at 21:36

## 2023-06-01 RX ADMIN — NICOTINE POLACRILEX 2 MG: 2 GUM, CHEWING ORAL at 12:40

## 2023-06-01 RX ADMIN — INSULIN ASPART 7 UNITS: 100 INJECTION, SOLUTION INTRAVENOUS; SUBCUTANEOUS at 08:39

## 2023-06-01 RX ADMIN — HYDROXYZINE HYDROCHLORIDE 25 MG: 25 TABLET, FILM COATED ORAL at 17:15

## 2023-06-01 RX ADMIN — TRETINOIN: 0.5 CREAM TOPICAL at 21:36

## 2023-06-01 RX ADMIN — BUSPIRONE HYDROCHLORIDE 10 MG: 10 TABLET ORAL at 19:59

## 2023-06-01 RX ADMIN — GABAPENTIN 1200 MG: 600 TABLET, FILM COATED ORAL at 19:59

## 2023-06-01 RX ADMIN — OMEPRAZOLE 20 MG: 20 CAPSULE, DELAYED RELEASE ORAL at 08:39

## 2023-06-01 ASSESSMENT — ACTIVITIES OF DAILY LIVING (ADL)
LAUNDRY: WITH SUPERVISION
DRESS: INDEPENDENT
HYGIENE/GROOMING: INDEPENDENT
ADLS_ACUITY_SCORE: 45
ORAL_HYGIENE: INDEPENDENT
HYGIENE/GROOMING: INDEPENDENT
DRESS: INDEPENDENT
ADLS_ACUITY_SCORE: 45
ADLS_ACUITY_SCORE: 45
LAUNDRY: UNABLE TO COMPLETE
ADLS_ACUITY_SCORE: 45
ORAL_HYGIENE: INDEPENDENT
ADLS_ACUITY_SCORE: 45
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
## 2023-06-01 NOTE — PROGRESS NOTES
"[facility], [place]   Psychiatric Progress Note  Hospital Day: 5        Interim History:   The patient's care was discussed with the treatment team during the daily team meeting and/or staff's chart notes were reviewed.  Staff report patient is exhibiting connection seeking behaviors with staff. Patient did report any acute medical concerns or side effects with exception of lower back pain. Given IM Toradol last evening with no relief. Requesting something stronger.\" No behavioral issues overnight, including violent or aggressive behaviors. Patient did not require seclusion or restraints. Patient is not exhibiting signs/sx of psychosis or haven with exception of longstanding command AH. Patient did endorse suicidal ideation. Patient did not endorse HI. Patient is medication adherent. Patient is attending groups. Patient is sleeping well. Patient is eating adequately. Patient is attending to ADLs.    Upon interview, the patient reports ongoing passive SI and feelings of being better off dead. States that he presented to ED rather than attempting suicide because he lost a brother one year ago and he does not want his parents to lose another child. Also mentioned that he has a dog who needs him. He tells me that he needs to be under a commitment for \"accountability.\" He cannot answer me when asked what has been a barrier in being accountable in the past without a commitment in place. He says that he uses substances and stops medications when not on a commitment and has historically \"done better when I am committed.\"     Suicidal ideation: Passive SI though no current intent or plan    Homicidal ideation: denies current or recent homicidal ideation or behaviors.    Psychotic symptoms: Patient reporting command AH telling him to kill himself and that he is stupid. Patient denies, VH, paranoia, delusions.     Medication side effects reported: No significant side effects other than \"the " "munchies\" from Seroquel.    Acute medical concerns: none, longstanding lower back pain    Other issues reported by patient: Patient had no further questions or concerns.           Medications:       amLODIPine  10 mg Oral Daily     amphetamine-dextroamphetamine  20 mg Oral Daily     benzoyl peroxide   Topical Daily     busPIRone  10 mg Oral TID     clonazePAM  0.5 mg Oral BID     doxycycline hyclate  100 mg Oral BID     gabapentin  1,200 mg Oral TID     [START ON 6/2/2023] insulin aspart  2 Units Subcutaneous Daily with breakfast     insulin aspart  2 Units Subcutaneous Daily with lunch     insulin aspart  2 Units Subcutaneous Daily with supper     insulin aspart  1-10 Units Subcutaneous TID AC     insulin aspart  1-7 Units Subcutaneous At Bedtime     [START ON 6/2/2023] insulin glargine  18 Units Subcutaneous QAM AC     lamoTRIgine  25 mg Oral Daily     nicotine  1 patch Transdermal Daily    And     nicotine   Transdermal Q8H RADHA     OLANZapine  20 mg Oral At Bedtime     omeprazole  20 mg Oral Daily     prazosin  1 mg Oral At Bedtime     QUEtiapine  100 mg Oral At Bedtime     rosuvastatin  10 mg Oral Daily     Semaglutide (1 MG/DOSE)  1 mg Subcutaneous Q7 Days     testosterone cypionate  20 mg Subcutaneous Weekly     tretinoin   Topical At Bedtime          Allergies:     Allergies   Allergen Reactions     Haldol [Haloperidol] Other (See Comments)     Makes patient very angry and anxious     Adhesive Tape Hives     Percocet [Oxycodone-Acetaminophen] Nausea and Vomiting     Prednisone Other (See Comments) and Hives     Suicidal ideation     Risperidone Other (See Comments)     Tramadol Hcl Nausea and Vomiting     Droperidol Anxiety     Seroquel [Quetiapine] Palpitations     Spent 2 weeks in the hospital due to having seroquel, caused palpitations and QT prolongation          Labs:     Recent Results (from the past 24 hour(s))   Glucose by meter    Collection Time: 05/31/23  4:49 PM   Result Value Ref Range    GLUCOSE " BY METER POCT 176 (H) 70 - 99 mg/dL   Glucose by meter    Collection Time: 05/31/23  9:01 PM   Result Value Ref Range    GLUCOSE BY METER POCT 286 (H) 70 - 99 mg/dL   Glucose by meter    Collection Time: 06/01/23  8:26 AM   Result Value Ref Range    GLUCOSE BY METER POCT 295 (H) 70 - 99 mg/dL   Glucose by meter    Collection Time: 06/01/23 11:40 AM   Result Value Ref Range    GLUCOSE BY METER POCT 206 (H) 70 - 99 mg/dL          Psychiatric Examination:     /82 (BP Location: Left arm, Patient Position: Sitting, Cuff Size: Adult Large)   Pulse 109   Temp 97.6  F (36.4  C) (Temporal)   Resp 16   Wt 108.6 kg (239 lb 6.4 oz)   LMP  (LMP Unknown)   SpO2 97%   BMI 39.53 kg/m    Weight is 239 lbs 6.4 oz  Body mass index is 39.53 kg/m .    Weight over time:  Vitals:    05/27/23 1225 05/30/23 0748   Weight: 107.9 kg (237 lb 12.8 oz) 108.6 kg (239 lb 6.4 oz)       Orthostatic Vitals       Most Recent      Standing Orthostatic /73 05/31 0824    Standing Orthostatic Pulse (bpm) 117 05/31 0824            Cardiometabolic risk assessment. 06/01/23      Reviewed patient profile for cardiometabolic risk factors    Date taken /Value  REFERENCE RANGE   Abdominal Obesity  (Waist Circumference)   See nursing flowsheet Women ?35 in (88 cm)   Men ?40 in (102 cm)      Triglycerides  Triglycerides   Date Value Ref Range Status   05/28/2023 240 (H) <150 mg/dL Final   04/28/2021 317 (H) <150 mg/dL Final       ?150 mg/dL (1.7 mmol/L) or current treatment for elevated triglycerides   HDL cholesterol  HDL Cholesterol   Date Value Ref Range Status   04/28/2021 37 (L) >40 mg/dL Final     Direct Measure HDL   Date Value Ref Range Status   05/28/2023 35 (L) >=40 mg/dL Final   ]   Women <50 mg/dL (1.3 mmol/L) in women or current treatment for low HDL cholesterol  Men <40 mg/dL (1 mmol/L) in men or current treatment for low HDL cholesterol     Fasting plasma glucose (FPG) Lab Results   Component Value Date     06/01/2023      05/05/2023     05/05/2023     05/19/2021      FPG ?100 mg/dL (5.6 mmol/L) or treatment for elevated blood glucose   Blood pressure  BP Readings from Last 3 Encounters:   06/01/23 133/82   05/22/23 138/88   04/19/23 122/78    Blood pressure ?130/85 mmHg or treatment for elevated blood pressure   Family History  See family history     Mental Status Exam:  Appearance: awake, alert and adequately groomed  Attitude:  cooperative  Eye Contact:  good  Mood:  depressed  Affect:  mood congruent  Speech:  clear, coherent  Language: fluent and intact in English  Psychomotor, Gait, Musculoskeletal:  no evidence of tardive dyskinesia, dystonia, or tics  Throught Process:  logical, linear and goal oriented  Associations:  no loose associations  Thought Content:  passive suicidal ideation present and auditory hallucinations present  Insight:  fair  Judgement:  fair  Oriented to:  time, person, and place  Attention Span and Concentration:  intact  Recent and Remote Memory:  intact  Fund of Knowledge:  appropriate    Clinical Global Impressions  First:  Considering your total clinical experience with this particular patient population, how severe are the patient's symptoms at this time?: 7 (05/27/23 1309)  Compared to the patient's condition at the START of treatment, this patient's condition is: 4 (05/27/23 1309)  Most recent:  Considering your total clinical experience with this particular patient population, how severe are the patient's symptoms at this time?: 7 (05/27/23 1309)  Compared to the patient's condition at the START of treatment, this patient's condition is: 4 (05/27/23 1309)           Precautions:     Behavioral Orders   Procedures     Code 1 - Restrict to Unit     Routine Programming     As clinically indicated     Status 15     Every 15 minutes.     Suicide precautions     Patients on Suicide Precautions should have a Combination Diet ordered that includes a Diet selection(s) AND a Behavioral  Tray selection for Safe Tray - with utensils, or Safe Tray - NO utensils            Diagnoses:     Schizoaffective disorder, bipolar type (H)  Borderline personality disorder  Moderate episode of recurrent major depressive disorder (H)  Suicidal ideation  Lab test negative for COVID-19 virus         Assessment & Plan:     Assessment and hospital summary:  This patient is a 32 year old transgender male with history of mood disorder, psychosis and substance use who presented to ED with SI in context of increased medical concerns with abnormal LFTs and uncontrolled BG, possible medication induced haven/hypomania with medication changes and elevated anxiety. Medically cleared in ED, UDS positive for amphetamines (on adderall) and cannabinoids; was evaluated by psychiatry consult service. PTA olanzapine and clonazepam increased, prazosin started for PTSD and lamotrigine started for mood. Admitted to 12 as voluntary patient and overflow patient for high acuity unit. PTA medications continued along with changes made to medications in ED with exception of Adderall discontinued for further evaluation by primary team. Pt also requests to take testosterone IM on 5/27 as has missed dose for 2 weeks due to increased depression/amotivation. Pt unsure of goals for hospitalization other to have SI, anxiety improved, likely will return to . Inpatient psychiatric hospitalization is warranted at this time for safety, stabilization, and possible adjustment in medications.    5/27: Testerone IM restarted    5/28: will resume PTA Adderall XR 20mg daily for ADHD, pt reports this has been well tolerated for >20 years with no change in psychosis, anxiety or mood state noted with changes in this medication, monitoring closely. Continue PTA clonazepam at increased dose of 1mg BID for anxiety.      5/30: Start BuSpar 10 mg 3 times daily for anxiety and Start Seroquel 50 mg 4 times daily as needed for anxiety    5/31:He says that Seroquel  works better for him, QT QTc was normal on EKG, will order Seroquel (100 mg) at night and decrease Zyprexa dose to 20 mg QHS.  He gained weight on Zyprexa and blood glucoses more difficult to control on Zyprexa.  He says that uncontrolled blood glucose affects his mood 2. We discussed decreasing Klonopin to 0.5 mg twice daily for few days and then discontinuing it.  Controlled substance is not the best option for him considering his history of chemical dependency. We also discussed addictive potential of Adderall, versus Strattera.     Today's Changes:  File for MI commitment WITHOUT Ashley    Target psychiatric symptoms and interventions:  Continue Zyprexa 20 mg nightly for mood stabilization and psychosis  Continue Seroquel 100 mg nightly for augmentation of Zyprexa, sleep, anxiety monitor EKG frequently/, should check it in a week  Seroquel 50 mg 4 times daily as needed for anxiety  BuSpar 10 mg 3 times daily for anxiety  Zyprexa 10 mg daily as needed p.o. or IM for psychosis, agitation and aggression  Prazosin 1 mg nightly for nightmares of PTSD  Depo testosterone injection 20 mg subcu weekly, first dose on 5/27/2023  If given IM, it  has to be given in glluleus   Lamictal 25 mg daily for mood stabilization, will titrate  Klonopin 0.5 mg twice daily for anxiety  Neurontin 1200 mg 3 times daily for anxiety and mood stabilization  Adderall XR 20 mg daily for ADHD    Risks, benefits, and alternatives discussed at length with patient.     Acute Medical Problems and Treatments:  Acute medical concerns:  Medical concerns:   1) Type 2 DM on insulin, poorly controlled, last A1c 9.1 on 5/5, had insulin dose increased on 5/22  -continue Lantus 15 units every morning before breakfast  -also per chart was prescribed semaglutide weekly, last dose 5/16, can use home supply if GH brings in  -BG checks ordered along with hypoglycemia medications   -IM consult placedfor further management, appreciate assistance, per note 6/1:       Brief Medicine Progress Note     Internal medicine following peripheral for blood sugar management in setting of poorly controlled diabetes mellitus, type 2.  Hgb A1c on 5/5/23 was 9.1%  Prior to admission, patient managed with Semaglutide 1mg every Tuesday and Lantus 15 units every morning. Medicine team started him on sliding scale insulin 5/27, with an increase to high sliding scale on 5/30. Blood sugars remain elevated in the 200s. On average, has been receiving 4-7 units sliding scale per parameters since being placed on the high intensity sliding scale.      -Increase morning Lantus from 15 units to 18 units (this will start 6/2)   -add prandial insulin, 2 units novolog with meals.   -continue glucose checks before meals and at bedtime.      Internal medicine will continue to follow      Gricelda Coello PA-C       2) Elevated LFTs, hx of fatty liver per chart   -repeat CMP shows LFTs downtrending   -was evaluated by PCP on 5/22, recommended avoiding acetaminophen and alcohol with GI referral and Abd US ordered   -will defer additional work up to IM      3) Hypertriglyceridemia history   -ordered lipid panel as above, per chart has declined to take fish oil when recommended by PCP  -lipid panel with (H) and HDL 35(L) otherwise WNL   -recommend follow up with PCP as outpatient      4) Leukocytosis on admit WBC 11.4(H)  -repeat WBC this AM normalized at 8.7  -no other evidence of infection   -continue to monitor     5) HTN  -monitor BPs  -continued PTA amlodipine   -follow up with PCP     6) Acne  -continued PTA doxycycline, benzoyl peroxide and tretinoin  -follow up with PCP/derm on discharge      7) Sinus tachycardia and borderline prolonged QTc of 476ms  -per EKG on 5/27  -continue to monitor vitals, electrolytes were WNL on admission   -recommend rechecking EKG periodically and avoiding QTc prolonging agents as able       Pertinent labs/imaging:  TSH WNL and lipid panel with (H) and HDL 35(L)  otherwise WNL; repeat WBC WNL; repeat CMP showed LFTs down trending with AST 76(H) and ALT 82(H), Ca now WNL and glucose 154(H); EKG with Sinus tachycardia with  and QTc of 476ms     Behavioral/Psychological/Social:  - Encourage unit programming    Safety:  - Continue precautions as noted above  - Status 15 minute checks  - Suicide precautions    Legal Status: 72 hour hold. Filing for MI commitment through [facility]    Disposition Plan   Reason for ongoing admission: poses an imminent risk to self  Discharge location: group home  Discharge Medications: not ordered  Follow-up Appointments: not scheduled    Entered by: Tessa Mendoza MD on 6/1/2023 at 12:51 PM

## 2023-06-01 NOTE — PROGRESS NOTES
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 for anxiety  BuSpar 10 mg 3 times daily for anxiety  Zyprexa 10 mg daily as needed p.o. or IM for psychosis, agitation and aggression  Prazosin 1 mg nightly for nightmares of PTSD  Depo testosterone injection 20 mg subcu weekly, first dose on 5/27/2023  If given IM, it  has to be given in glluleus   Lamictal 25 mg daily for mood stabilization, will titrate  Klonopin 1 mg twice daily for anxiety  Neurontin 1200 mg 3 times daily for anxiety and mood stabilization  Adderall XR 20 mg daily for ADHD  Melatonin 3 mg nightly as needed for pain  Ibuprofen 600 mg 4 times daily as needed for pain  Nicotine gum 2 mg q 1 hour prn for smoking secession  Kalyn-Colace 1 tablet twice daily as needed for constipation  Continue other nonpsychiatric medications  We discussed side effects, benefits and alternative treatments and patient agrees .  Legal: 72 hour hold for safety, stabilization medication management, auditory hallucinations of command type telling him to kill himself  Suicide precautions  Full code   will collect collateral information and make outpatient referrals  Staff to provide emotional support and redirect as needed  Patient  encouraged to attend groups  Lab results: Reviewed personally, glucose monitoring 4 times daily before meals and at bedtime  Consultation: medicine consult completed on May 27\,2023    Risk Assessment: Auditory hallucinations of command type telling patient to kill himself, commitment request to be filed , pt says he functioned better under commitment..    Coordination of Care:   Patient seen, medical record reviewed, care coordinated with the team.    Total time:  More than 50 minutes spent on this visit with more than 50% time  spent on coordination of care with staff, team meeting,issuing hold, reviewing medical record, educating patient about treatment options, side effects and benefits and alternative treatments for medications, providing supportive therapy regarding above issues,entering orders and preparing documentation for the visit.    This document is created with the help of Dragon dictation system.  All grammatical/typing errors or context distortion are unintentional and inherent to software.    Gillian Andrade MD       Re-Certification I certify that the inpatient psychiatric facility services furnished since the previous certification were, and continue to be, medically necessary for, either, treatment which could reasonably be expected to improve the patient s condition or diagnostic study and that the hospital records indicate that the services furnished were, either, intensive treatment services, admission and related services necessary for diagnostic study, or equivalent services.     I certify that the patient continues to need, on a daily basis, active treatment furnished directly by or requiring the supervision of inpatient psychiatric facility personnel.   I estimate TBD days of hospitalization is necessary for proper treatment of the patient. My plans for post-hospital care for this patient are : Medications, appointments     Gillian Andrade MD

## 2023-06-01 NOTE — PLAN OF CARE
"Nursing Assessment    Recent Vitals: B/P: 133/82, T: 97.6, P: 109, R: 16     Sleep:  Hours of sleep at night: 6.75    General Shift Summary  Patient has been present in the lounge and partakes in groups. He frequently requests for Nicotine gum or often approaches writer with questions about medications. Patient was provided medication education on insulin. Patient stated at the start of the shift \"I'll be making a lot of medication requests just to let you know.\". He presents with a flat/blunted affect. He voiced having a mild amount of anxiety that is relieved by medications and craft projects. Patient voiced having mild SI with no plan. He stated he can contract for safety. He denied AVH/HI. Hygiene is fair, patient was untidy until after lunch when he showered. He is eating well.     Patient seems to have poor incite into his diabetes management. He quickly started to eat prior to writer obtaining his morning blood sugar. Blood sugar level was taken as patient was half way through eating. He stated that he couldn't remember when he was suppose to have blood sugar levels taken. Later he requested for 10 sugars with his coffee and was provided with 5. Writer educated him on sugar substitutes and reducing overall sugar intake.     Latest Reference Range & Units 06/01/23 08:26 06/01/23 11:40   GLUCOSE BY METER POCT 70 - 99 mg/dL 295 (H) 206 (H)   (H): Data is abnormally high    Patient is medication cooperative with no voiced side effects. Denies pain.    Plan is to commit patient.    Juliana Kim, RN MSN  "

## 2023-06-01 NOTE — PLAN OF CARE
"  Problem: Adult Behavioral Health Plan of Care  Goal: Adheres to Safety Considerations for Self and Others  Outcome: Progressing   Goal Outcome Evaluation:    Plan of Care Reviewed With: patient    Pt presenting with flat and blunted affect being visible on the unit with no aggression or behavior problem but frequently seeking staff every 15 to 30 minutes .   Pt did eat  for supper and has been medication complaint .  Insulin coverage given per sliding scale . Pt received IM Toradol 15 mg  for pain without relieve . Later pt was offer Atarax 25 mg for Anxiety and Tylenol 650 mg for pain . Pt denied all mental health Sx stating \" I am fine mentally but hurting physically\"  Will continue POC                  "

## 2023-06-01 NOTE — PLAN OF CARE
Problem: Sleep Disturbance  Goal: Adequate Sleep/Rest  Outcome: Progressing   Goal Outcome Evaluation:    Patient appeared to sleep 6.75 hours this night shift.  No prns or snacks given or requested.  No concerns were reported or noted.

## 2023-06-01 NOTE — PROGRESS NOTES
Brief Medicine Progress Note    Internal medicine following peripheral for blood sugar management in setting of poorly controlled diabetes mellitus, type 2.  Hgb A1c on 5/5/23 was 9.1%  Prior to admission, patient managed with Semaglutide 1mg every Tuesday and Lantus 15 units every morning. Medicine team started him on sliding scale insulin 5/27, with an increase to high sliding scale on 5/30. Blood sugars remain elevated in the 200s. On average, has been receiving 4-7 units sliding scale per parameters since being placed on the high intensity sliding scale.     -Increase morning Lantus from 15 units to 18 units (this will start 6/2)   -add prandial insulin, 2 units novolog with meals.   -continue glucose checks before meals and at bedtime.     Internal medicine will continue to follow     Gricelda Coello PA-C  Hospitalist Service  [facility], [place]  Pager: [phone]

## 2023-06-01 NOTE — PLAN OF CARE
Assessment/Intervention/Current Symptoms and Care Coordination:  Met with team. Met with patient. Reviewed chart. Petition for Judicial Commitment paperwork sent to [facility]. , Cristy, planning to visit today and has requested a nurse be present - [facility] informed and communicating this request.      Discharge Plan or Goal:  Discharge back to [facility] in [place].      Barriers to Discharge:  Discharge pending symptom stabilization and medication management. Pre-petition paperwork sent to [facility].     Referral Status:  No referrals sent, need for referrals being assessed     Legal Status:  72HH expires 6/2 at 1717    Pre-petition paperwork sent to [facility] on 6/1 at 1:30pm    Contacts:  : Cristy at [facility], [phone] (VAL signed)   [facility]: Deena at [facility], [phone] (VAL signed)   [facility] Director and Nurse: Domenica [phone] (VAL signed)   CADI worker: Pretty Guzman at [facility], [phone] (VAL signed)   Psychotherapist: Joshua at [facility], [phone] (VAL signed)      Upcoming Meetings and Dates/Important Information and next steps:  Psychiatrist follow-up scheduled

## 2023-06-02 ENCOUNTER — MEDICAL CORRESPONDENCE (OUTPATIENT)
Dept: HEALTH INFORMATION MANAGEMENT | Facility: CLINIC | Age: 33
End: 2023-06-02
Payer: MEDICARE

## 2023-06-02 LAB
GLUCOSE BLDC GLUCOMTR-MCNC: 207 MG/DL (ref 70–99)
GLUCOSE BLDC GLUCOMTR-MCNC: 236 MG/DL (ref 70–99)
GLUCOSE BLDC GLUCOMTR-MCNC: 250 MG/DL (ref 70–99)
GLUCOSE BLDC GLUCOMTR-MCNC: 321 MG/DL (ref 70–99)

## 2023-06-02 PROCEDURE — 250N000013 HC RX MED GY IP 250 OP 250 PS 637: Performed by: PSYCHIATRY & NEUROLOGY

## 2023-06-02 PROCEDURE — H2032 ACTIVITY THERAPY, PER 15 MIN: HCPCS

## 2023-06-02 PROCEDURE — 124N000002 HC R&B MH UMMC

## 2023-06-02 PROCEDURE — G0177 OPPS/PHP; TRAIN & EDUC SERV: HCPCS

## 2023-06-02 PROCEDURE — 99232 SBSQ HOSP IP/OBS MODERATE 35: CPT | Performed by: PSYCHIATRY & NEUROLOGY

## 2023-06-02 RX ADMIN — NICOTINE POLACRILEX 2 MG: 2 GUM, CHEWING ORAL at 12:30

## 2023-06-02 RX ADMIN — NICOTINE POLACRILEX 2 MG: 2 GUM, CHEWING ORAL at 10:24

## 2023-06-02 RX ADMIN — ROSUVASTATIN CALCIUM 10 MG: 10 TABLET, FILM COATED ORAL at 08:17

## 2023-06-02 RX ADMIN — CLONAZEPAM 0.5 MG: 0.5 TABLET ORAL at 08:18

## 2023-06-02 RX ADMIN — NICOTINE POLACRILEX 2 MG: 2 GUM, CHEWING ORAL at 16:06

## 2023-06-02 RX ADMIN — DOXYCYCLINE HYCLATE 100 MG: 50 CAPSULE ORAL at 08:17

## 2023-06-02 RX ADMIN — QUETIAPINE FUMARATE 100 MG: 100 TABLET ORAL at 20:39

## 2023-06-02 RX ADMIN — NICOTINE POLACRILEX 2 MG: 2 GUM, CHEWING ORAL at 08:31

## 2023-06-02 RX ADMIN — GABAPENTIN 1200 MG: 600 TABLET, FILM COATED ORAL at 19:00

## 2023-06-02 RX ADMIN — PRAZOSIN HYDROCHLORIDE 1 MG: 1 CAPSULE ORAL at 20:39

## 2023-06-02 RX ADMIN — NICOTINE POLACRILEX 2 MG: 2 GUM, CHEWING ORAL at 17:46

## 2023-06-02 RX ADMIN — OLANZAPINE 20 MG: 20 TABLET, FILM COATED ORAL at 20:38

## 2023-06-02 RX ADMIN — NICOTINE POLACRILEX 2 MG: 2 GUM, CHEWING ORAL at 06:52

## 2023-06-02 RX ADMIN — NICOTINE POLACRILEX 2 MG: 2 GUM, CHEWING ORAL at 20:53

## 2023-06-02 RX ADMIN — ACETAMINOPHEN 650 MG: 325 TABLET, FILM COATED ORAL at 21:45

## 2023-06-02 RX ADMIN — INSULIN ASPART 4 UNITS: 100 INJECTION, SOLUTION INTRAVENOUS; SUBCUTANEOUS at 17:12

## 2023-06-02 RX ADMIN — NICOTINE POLACRILEX 2 MG: 2 GUM, CHEWING ORAL at 13:52

## 2023-06-02 RX ADMIN — ACETAMINOPHEN 650 MG: 325 TABLET, FILM COATED ORAL at 15:06

## 2023-06-02 RX ADMIN — Medication 5 MG: at 20:39

## 2023-06-02 RX ADMIN — DEXTROAMPHETAMINE SULFATE, DEXTROAMPHETAMINE SACCHARATE, AMPHETAMINE SULFATE AND AMPHETAMINE ASPARTATE 20 MG: 5; 5; 5; 5 CAPSULE, EXTENDED RELEASE ORAL at 08:17

## 2023-06-02 RX ADMIN — NICOTINE 1 PATCH: 21 PATCH, EXTENDED RELEASE TRANSDERMAL at 08:17

## 2023-06-02 RX ADMIN — CLONAZEPAM 0.5 MG: 0.5 TABLET ORAL at 19:01

## 2023-06-02 RX ADMIN — LAMOTRIGINE 25 MG: 25 TABLET ORAL at 08:18

## 2023-06-02 RX ADMIN — NICOTINE POLACRILEX 2 MG: 2 GUM, CHEWING ORAL at 19:02

## 2023-06-02 RX ADMIN — BUSPIRONE HYDROCHLORIDE 10 MG: 10 TABLET ORAL at 13:52

## 2023-06-02 RX ADMIN — HYDROXYZINE HYDROCHLORIDE 25 MG: 25 TABLET, FILM COATED ORAL at 10:20

## 2023-06-02 RX ADMIN — INSULIN ASPART 5 UNITS: 100 INJECTION, SOLUTION INTRAVENOUS; SUBCUTANEOUS at 07:59

## 2023-06-02 RX ADMIN — OMEPRAZOLE 20 MG: 20 CAPSULE, DELAYED RELEASE ORAL at 08:17

## 2023-06-02 RX ADMIN — AMLODIPINE BESYLATE 10 MG: 10 TABLET ORAL at 08:18

## 2023-06-02 RX ADMIN — INSULIN ASPART 3 UNITS: 100 INJECTION, SOLUTION INTRAVENOUS; SUBCUTANEOUS at 12:00

## 2023-06-02 RX ADMIN — GABAPENTIN 1200 MG: 600 TABLET, FILM COATED ORAL at 13:51

## 2023-06-02 RX ADMIN — DOXYCYCLINE HYCLATE 100 MG: 50 CAPSULE ORAL at 20:13

## 2023-06-02 RX ADMIN — BUSPIRONE HYDROCHLORIDE 10 MG: 10 TABLET ORAL at 19:00

## 2023-06-02 RX ADMIN — GABAPENTIN 1200 MG: 600 TABLET, FILM COATED ORAL at 08:17

## 2023-06-02 RX ADMIN — BUSPIRONE HYDROCHLORIDE 10 MG: 10 TABLET ORAL at 08:18

## 2023-06-02 ASSESSMENT — ACTIVITIES OF DAILY LIVING (ADL)
ADLS_ACUITY_SCORE: 45
DRESS: INDEPENDENT
ORAL_HYGIENE: INDEPENDENT
DRESS: INDEPENDENT
ADLS_ACUITY_SCORE: 45
HYGIENE/GROOMING: INDEPENDENT
ADLS_ACUITY_SCORE: 45
ADLS_ACUITY_SCORE: 45
HYGIENE/GROOMING: INDEPENDENT
ADLS_ACUITY_SCORE: 45
LAUNDRY: UNABLE TO COMPLETE
ADLS_ACUITY_SCORE: 45
ORAL_HYGIENE: INDEPENDENT
ADLS_ACUITY_SCORE: 45

## 2023-06-02 NOTE — PLAN OF CARE
"  Problem: Suicide Risk  Goal: Absence of Self-Harm  Outcome: Not Progressing   Goal Outcome Evaluation:    Plan of Care Reviewed With: patient      Patient's blood sugar pre-breakfast was 250, and he was given 5 units of short acting Insulin and 18 units of long acting, and with breakfast he was given 2 units of short acting. Patient's blood sugar pre-lunch was 207 and he received 3 units of short acting before lunch and with lunch  Patient Patient was requesting to have gel pens and coloring pencils. Per provider it's okay for him to use gel pens out in the visible area during group or per nurse discretion. He was encouraged to engage  activities and listen to music.      He was stating \" I am having thoughts right now and my anxiety is getting worse\". Patient was given PRN hydroxyzine to help with anxiety. He took scheduled morning medications. PRN Nicorette gum .     He does endorse having anxiety, depression, and thoughts of wanting to harm himself but no plan and does contract for safety. PRN hydroxyzine was effective.           "

## 2023-06-02 NOTE — CARE PLAN
Occupational Therapy Group Note:     06/02/23 1517   Group Therapy Session   Group Attendance attended group session   Time Session Began 1100   Time Session Ended 1145   Total Time (minutes) 45   Total # Attendees 1   Group Type task skill   Group Topic Covered coping skills/lifestyle management;emotions/expression;leisure exploration/use of leisure time;structured socialization   Group Session Detail OT Clinic Group Part 2   Patient Response/Contribution cooperative with task   Patient Participation Detail Patient continued engagement in artistic projects (scratch art and gel pen coloring task) from OT clinic group (10:15 a.m. group). Patient was the only participate in this 11:15 a.m. group timeframe; therefore, the option was provided to patient to continue work on art projects or transition focus to a topic group; patient requested to continue work on artistic projects. Patient requested the opportunity to utilize personal gel pens from locked locker to engage in a meaningful coloring task via personal coloring book; writer approved request. Psych associate retrieved gel pens from locker and provided them to patient. Patient utilized pens safely and appropriately throughout group. Patient spoke about how art focused activities are helpful to him and how he incorporates art into his daily lifestyle outside of the hospital setting. Patient engaged in casual conversation with writer throughout group. Affect appeared to brighten with 1:1 interaction and conversation. Patient appeared grateful for having the opportunity to utilize gel pens at end of group. Gel pens returned to patient's locked locker at end of group. Calm, cooperative, engaged, and pleasant participant in group.

## 2023-06-02 NOTE — PROGRESS NOTES
06/02/23 1800   Group Therapy Session   Group Attendance attended group session   Time Session Began 1615   Time Session Ended 1700   Total Time (minutes) 40   Total # Attendees 4   Group Type recreation   Group Topic Covered leisure exploration/use of leisure time   Group Session Detail TR leisure group   Patient Response/Contribution cooperative with task   Patient Participation Detail Pt attended the structured Therapeutic Recreation group, participating in a group activity. Pt participated in group discussion, leisure participation, and social engagement to gain self-esteem, manage behaviors, improve social skills, decrease isolation, and reduce anxiety/depression.   Pt was passively engaged in the group activity, coloring with gel pens throughout the group. Pt chose not to take any turns in the activity, but occasionally helped contribute to the clues and descriptions for others' turns.

## 2023-06-02 NOTE — PLAN OF CARE
*Per Ele at [facility], Prakash's legal name was changed last year to: Prakash Prasad*      Assessment/Intervention/Current Symptoms and Care Coordination:  Met with team. Met with patient. Reviewed chart. Received verbal support for a commitment of Mentally Ill from [facility] pre-petitioner, Kel Gallardo, at 10:28am on 06/02.      Discharge Plan or Goal:  Discharge back to [facility] in [place].      Barriers to Discharge:  Discharge pending symptom stabilization and medication management.      Referral Status:  Referred to [facility] Health Day Treatment on 06/02     Legal Status:  Verbal support for the MI commitment received at 10:28am on 06/02.     Contacts:  : Cristy at [facility], [phone] (VAL signed)   [facility]: Deena at [facility], [phone] (VAL signed)   [facility] Director and Nurse: Domenica [phone] (VAL signed)   CADI worker: Pretty Guzman at [facility], [phone] (VAL signed)   Psychotherapist: Joshua at [facility], [phone] (VAL signed)      Upcoming Meetings and Dates/Important Information and next steps:  Psychiatrist follow-up scheduled

## 2023-06-02 NOTE — PLAN OF CARE
Problem: Adult Behavioral Health Plan of Care  Goal: Absence of New-Onset Illness or Injury  Outcome: Progressing  Note: Patient reports no new-onset illness or injury     Problem: Suicidal Behavior  Goal: Suicidal Behavior is Absent or Managed  Outcome: Progressing  Note: Patient manifests no suicidal behavior this shift   Goal Outcome Evaluation:       Patient slept well (6.5 hrs) declined blood sugar check at 0200. Presents no complaints and requests no prn medications. All precautions in place, endorses no SI/HI, A/VH or SIB.Will continue to monitor.         Earle Mancini DNP, RN

## 2023-06-02 NOTE — PROGRESS NOTES
Writer approached patient for a BG check at 0200 but declined re approached again later still declined.  Earle Mancini DNP, RN.

## 2023-06-02 NOTE — PROGRESS NOTES
Pt was ready and waiting for dance/movement therapy (D/MT) when therapist arrived on the unit.  Pt acknowledged remembering this therapist from previous hospitalizations on other units and provided a brief recent history update. Due to familiarity with D/MT group processes, pt was a leader in facilitating socially-engaged movements for building a sense of connection, community and belonging.  Vitalizing and organizing movements supported an acknowledgement and affirmation of individual creative self-expression.  Pt supported peers through group processes of affirmation as well.      Pt requested some time to speak with therapist after the session and shared that his recent gender transition was not something he felt he could share with his outpatient therapist and requested 1:1 psychotherapy for this purpose while inpatient.  He stated he is concerned his mental health will interfere with an upcoming gender-affirming surgery in July.  This therapist offered some ideas for support available on the unit: some specialty therapists as well as the unit clinical .  This was communicated to his CTC.         06/01/23 6105   Expressive Therapy   Therapy Type dance/movement   Minutes of Treatment 70

## 2023-06-02 NOTE — CARE PLAN
"Occupational Therapy Group Note:     06/02/23 1505   Group Therapy Session   Group Attendance attended group session   Time Session Began 1015   Time Session Ended 1100   Total Time (minutes) 45   Total # Attendees 2   Group Type task skill   Group Topic Covered coping skills/lifestyle management;emotions/expression;leisure exploration/use of leisure time;structured socialization   Group Session Detail OT Clinic Group   Patient Response/Contribution confronted peers appropriately;cooperative with task   Patient Participation Detail Pt actively participated in occupational therapy clinic to facilitate coping skill exploration, creative expression within personally meaningful activities, and clinical observation of social, cognitive, and kinesthetic performance skills. Patient reported feeling anxious and uncomfortable following conversation with the \"commitment person\" this morning. Patient reported, \"the questions were very in-depth and it made me uncomfortable.\" Writer offered patient the opportunity to continue engagement with his scratch-art project; patient appeared motivated by this suggestion. Patient reported, \"I think a PRN and art will be perfect to take my mind off it\" (referring to the commitment conversation). Patient gathered scratch art canvas from room and utilized scratch art tool appropriately throughout session. Patient appears willing and wanting to show his artwork with others (gathered coloring and collage projects from previous sessions). Seems receptive to positive feedback about artwork. Patient worked diligently on task for full duration of group; unable to reach task completion. Engaged in superficial and general conversation about an array of subjects. Patient made multiple song requests throughout group. Affect: blunted. Mood: calm, cooperative, pleasant. Patient reported at end of session, \"This brought my anxiety level way down.\"       "

## 2023-06-02 NOTE — CARE PLAN
"   06/01/23 2100   Group Therapy Session   Group Attendance attended group session   Time Session Began 1015   Time Session Ended 1115   Total Time (minutes) 60   Total # Attendees 3   Group Type expressive therapy   Group Topic Covered emotions/expression   Patient Response/Contribution cooperative with task   Art Therapy directive was to create a future-focused \"vision board\" collage using magazines and mixed media art materials.  Goals of directive: to identify personal strengths and goals, emotional expression, emotional regulation, mindfulness, to assess motivation for change, future-focused directive.  Pt was an engaged participant, focused on task for the full duration of group. Pt talked about how he has an undergrad degree in Biology and started a Master's degree in natural sciences but had to put this education on hold \"because of my mental health issues.\" Pt is working on a large vision board that contains nature imagery expressing a personal narrative- pts past/childhood memories (camping yearly with his father in the [facility]) pts present (the natural world as healing for pt) and the future (finishing degree and working for the [employer]).   Pt later talked about how he would like to meet with a therapist for a 1:1 to talk about more personal issues. Author began to start a 1:1 with pt, however other pts came back to group and the [facility] was not a quiet space suitable for a 1:1 at that time. Pt requests to check in 1:1 with a therapist when someone is available to do so. Author will further check in with pt at a later date.  Pts mood was calm, pleasant participant.  "

## 2023-06-03 LAB
GLUCOSE BLDC GLUCOMTR-MCNC: 256 MG/DL (ref 70–99)
GLUCOSE BLDC GLUCOMTR-MCNC: 256 MG/DL (ref 70–99)
GLUCOSE BLDC GLUCOMTR-MCNC: 334 MG/DL (ref 70–99)
GLUCOSE BLDC GLUCOMTR-MCNC: 336 MG/DL (ref 70–99)

## 2023-06-03 PROCEDURE — 250N000013 HC RX MED GY IP 250 OP 250 PS 637

## 2023-06-03 PROCEDURE — 250N000013 HC RX MED GY IP 250 OP 250 PS 637: Performed by: PSYCHIATRY & NEUROLOGY

## 2023-06-03 PROCEDURE — G0177 OPPS/PHP; TRAIN & EDUC SERV: HCPCS

## 2023-06-03 PROCEDURE — 124N000002 HC R&B MH UMMC

## 2023-06-03 PROCEDURE — 250N000011 HC RX IP 250 OP 636: Performed by: PSYCHIATRY & NEUROLOGY

## 2023-06-03 RX ADMIN — BUSPIRONE HYDROCHLORIDE 10 MG: 10 TABLET ORAL at 07:34

## 2023-06-03 RX ADMIN — NICOTINE POLACRILEX 2 MG: 2 GUM, CHEWING ORAL at 11:14

## 2023-06-03 RX ADMIN — INSULIN ASPART 5 UNITS: 100 INJECTION, SOLUTION INTRAVENOUS; SUBCUTANEOUS at 11:13

## 2023-06-03 RX ADMIN — TRETINOIN: 0.5 CREAM TOPICAL at 21:50

## 2023-06-03 RX ADMIN — NICOTINE POLACRILEX 2 MG: 2 GUM, CHEWING ORAL at 08:02

## 2023-06-03 RX ADMIN — NICOTINE POLACRILEX 2 MG: 2 GUM, CHEWING ORAL at 22:09

## 2023-06-03 RX ADMIN — Medication 5 MG: at 21:48

## 2023-06-03 RX ADMIN — BUSPIRONE HYDROCHLORIDE 10 MG: 10 TABLET ORAL at 19:14

## 2023-06-03 RX ADMIN — DOXYCYCLINE HYCLATE 100 MG: 50 CAPSULE ORAL at 07:34

## 2023-06-03 RX ADMIN — PRAZOSIN HYDROCHLORIDE 1 MG: 1 CAPSULE ORAL at 21:49

## 2023-06-03 RX ADMIN — GABAPENTIN 1200 MG: 600 TABLET, FILM COATED ORAL at 19:14

## 2023-06-03 RX ADMIN — OLANZAPINE 5 MG: 5 TABLET, FILM COATED ORAL at 15:28

## 2023-06-03 RX ADMIN — CLONAZEPAM 0.5 MG: 0.5 TABLET ORAL at 07:34

## 2023-06-03 RX ADMIN — DEXTROAMPHETAMINE SULFATE, DEXTROAMPHETAMINE SACCHARATE, AMPHETAMINE SULFATE AND AMPHETAMINE ASPARTATE 20 MG: 5; 5; 5; 5 CAPSULE, EXTENDED RELEASE ORAL at 07:34

## 2023-06-03 RX ADMIN — NICOTINE POLACRILEX 2 MG: 2 GUM, CHEWING ORAL at 15:28

## 2023-06-03 RX ADMIN — ROSUVASTATIN CALCIUM 10 MG: 10 TABLET, FILM COATED ORAL at 07:33

## 2023-06-03 RX ADMIN — NICOTINE 1 PATCH: 21 PATCH, EXTENDED RELEASE TRANSDERMAL at 08:20

## 2023-06-03 RX ADMIN — BUSPIRONE HYDROCHLORIDE 10 MG: 10 TABLET ORAL at 14:08

## 2023-06-03 RX ADMIN — LAMOTRIGINE 25 MG: 25 TABLET ORAL at 07:34

## 2023-06-03 RX ADMIN — NICOTINE POLACRILEX 2 MG: 2 GUM, CHEWING ORAL at 18:32

## 2023-06-03 RX ADMIN — DOXYCYCLINE HYCLATE 100 MG: 50 CAPSULE ORAL at 19:14

## 2023-06-03 RX ADMIN — INSULIN ASPART 5 UNITS: 100 INJECTION, SOLUTION INTRAVENOUS; SUBCUTANEOUS at 07:35

## 2023-06-03 RX ADMIN — NICOTINE POLACRILEX 2 MG: 2 GUM, CHEWING ORAL at 19:45

## 2023-06-03 RX ADMIN — QUETIAPINE FUMARATE 100 MG: 100 TABLET ORAL at 21:48

## 2023-06-03 RX ADMIN — AMLODIPINE BESYLATE 10 MG: 10 TABLET ORAL at 07:34

## 2023-06-03 RX ADMIN — GABAPENTIN 1200 MG: 600 TABLET, FILM COATED ORAL at 14:07

## 2023-06-03 RX ADMIN — NICOTINE POLACRILEX 2 MG: 2 GUM, CHEWING ORAL at 12:51

## 2023-06-03 RX ADMIN — GABAPENTIN 1200 MG: 600 TABLET, FILM COATED ORAL at 07:34

## 2023-06-03 RX ADMIN — OLANZAPINE 20 MG: 20 TABLET, FILM COATED ORAL at 22:08

## 2023-06-03 RX ADMIN — TESTOSTERONE CYPIONATE 20 MG: 200 INJECTION, SOLUTION INTRAMUSCULAR at 12:30

## 2023-06-03 RX ADMIN — INSULIN ASPART 8 UNITS: 100 INJECTION, SOLUTION INTRAVENOUS; SUBCUTANEOUS at 17:03

## 2023-06-03 RX ADMIN — NICOTINE POLACRILEX 2 MG: 2 GUM, CHEWING ORAL at 09:04

## 2023-06-03 RX ADMIN — OMEPRAZOLE 20 MG: 20 CAPSULE, DELAYED RELEASE ORAL at 07:34

## 2023-06-03 RX ADMIN — CLONAZEPAM 0.5 MG: 0.5 TABLET ORAL at 19:14

## 2023-06-03 RX ADMIN — HYDROXYZINE HYDROCHLORIDE 25 MG: 25 TABLET, FILM COATED ORAL at 18:32

## 2023-06-03 RX ADMIN — INSULIN ASPART 6 UNITS: 100 INJECTION, SOLUTION INTRAVENOUS; SUBCUTANEOUS at 12:03

## 2023-06-03 ASSESSMENT — ACTIVITIES OF DAILY LIVING (ADL)
ADLS_ACUITY_SCORE: 45

## 2023-06-03 NOTE — CARE PLAN
Occupational Therapy Group Note:     06/03/23 1222   Group Therapy Session   Group Attendance attended group session   Time Session Began 1115   Time Session Ended 1200   Total Time (minutes) 45   Total # Attendees 2   Group Type recreation   Group Topic Covered leisure exploration/use of leisure time;structured socialization   Group Session Detail OT Leisure Group: [employer]   Patient Response/Contribution confronted peers appropriately;cooperative with task;listened actively   Patient Participation Detail Patient actively engaged in a occupational therapy group with leisure exploration and engagement being the topic and focus. Leisure exploration offered for increased intrinsic motivation to engage in social situations with peers and exercise cognitive skills. Patient was familiar with game. Patient was able to actively engage in game independently without cueing required. Patient remained focused/attentive to game throughout session. Patient was able to complete basic mathematical calculations independently. Patient was supportive to peer. Occasional conversational statements were noted. Affect: congruent. Mood: calm, pleasant, cooperative.   Patient was also agreeable to engage in a discussion based activity to maximize social engagement, challenge memory and recall, and promote positive self-reflection. Patient appeared comfortable answering conversational prompts in game. Shared a favorite childhood memory (getting a pony), favorite holiday (4th of July), three things most important to him, etc. Patient appeared to be actively listening throughout discussion and asked peer appropriate follow-up questions. Affect appeared to brighten with this social engagement. Patient did have difficulty when prompted to name one positive thing he has done today; patient appeared grateful and receptive to writer and peer identifying things for him (utilizing coping skills, developing a handshake with  peer).     Of note, patient has expressed interest in a topic based group about strengths. Potential group activity for future sessions.

## 2023-06-03 NOTE — PROGRESS NOTES
"Pt observed isolative to room stating they are having command hallucination to kill self. Stated, I feel stuck, I get depressed and then have the voices which tell me to kill myself by not taking my insulin, or to hang myself\". \"The only thing that stops me is that it would be the second death that my parents would have to endure\". \"The other thing is nugget, she is my 4 year old black lab\". \"I try to remember that there is a reason why I am here and that I need the help. I try to remember this but I worry that I am waisting Dr. Mendoza's time and I feel unworthy\". \"If Dr. Mendoza takes my klonopin away, I fear things may get worse\".    Pt supported for transparency and appeared appreciative of processing their thoughts. Pt contracted for safety and stated they will tell staff if things become unsafe. Pt encouraged to come out to the lounge with peers and staff which they did. Pt stated they have a good appetite and are ready for dinner to come soon.Writer informed pt's nurse at this time. Will continue to monitor.  "

## 2023-06-03 NOTE — PROGRESS NOTES
Brief Medicine Progress Note    Internal Medicine following peripherally for blood sugar management. Blood sugars remain elevated in the 200-300 range.  Currently on high intensity sliding scale, prandial insulin (3 units with each meal) and Lantus 18 units every morning.     -increase prandial insulin coverage to 6 units   -Continue current Lantus dose     Internal medicine will continue to follow     Gricelda Coello PA-C  Hospitalist Service  [facility], [place]  Pager: [phone]

## 2023-06-03 NOTE — PLAN OF CARE
"  Problem: Suicidal Behavior  Goal: Suicidal Behavior is Absent or Managed  Outcome: Progressing   Goal Outcome Evaluation:    Plan of Care Reviewed With: patient      Patient has been out on the unit engaged in activities, and using the gel pens. When patient is using the gel pen staff need to monitor for safety and making sure the patient does not use that for self injuries behaviors. Patient denies having any thoughts of wanting to harm himself or others, but does endorse having depression and anxiety, and having auditory hallucinations \" to hurt myself\" . He is agreeable to staying safe and sena for safety.    Patient took scheduled medication. Patient's blood sugar pre-breakfast and pre-lunch was 256, and he received 5 units of short acting Insulin at both times. He was also given 18 units of the long acting in the morning.     Blood pressure 104/68, pulse 92, temperature 98.4  F (36.9  C), temperature source Temporal, resp. rate 16, weight 108.6 kg (239 lb 6.4 oz), SpO2 96 %, not currently breastfeeding.        "
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
## 2023-06-03 NOTE — PLAN OF CARE
Problem: Adult Behavioral Health Plan of Care  Goal: Adheres to Safety Considerations for Self and Others  Intervention: Develop and Maintain Individualized Safety Plan  Recent Flowsheet Documentation  Taken 6/2/2023 2100 by Stephany Gonzalez RN  Safety Measures: environmental rounds completed   Goal Outcome Evaluation:    Plan of Care Reviewed With: patient    Patient appeared to sleep 6.75 hours this night shift. Up once to use the bathroom.   No prns or snacks given or requested.  No concerns were reported or noted.

## 2023-06-04 LAB
GLUCOSE BLDC GLUCOMTR-MCNC: 171 MG/DL (ref 70–99)
GLUCOSE BLDC GLUCOMTR-MCNC: 243 MG/DL (ref 70–99)
GLUCOSE BLDC GLUCOMTR-MCNC: 271 MG/DL (ref 70–99)
GLUCOSE BLDC GLUCOMTR-MCNC: 297 MG/DL (ref 70–99)

## 2023-06-04 PROCEDURE — 124N000002 HC R&B MH UMMC

## 2023-06-04 PROCEDURE — 250N000013 HC RX MED GY IP 250 OP 250 PS 637: Performed by: PSYCHIATRY & NEUROLOGY

## 2023-06-04 RX ORDER — SALIVA STIMULANT COMB. NO.3
2 SPRAY, NON-AEROSOL (ML) MUCOUS MEMBRANE 4 TIMES DAILY PRN
Status: DISCONTINUED | OUTPATIENT
Start: 2023-06-04 | End: 2023-06-09 | Stop reason: HOSPADM

## 2023-06-04 RX ADMIN — DOXYCYCLINE HYCLATE 100 MG: 50 CAPSULE ORAL at 20:12

## 2023-06-04 RX ADMIN — NICOTINE POLACRILEX 2 MG: 2 GUM, CHEWING ORAL at 08:09

## 2023-06-04 RX ADMIN — NICOTINE POLACRILEX 2 MG: 2 GUM, CHEWING ORAL at 16:37

## 2023-06-04 RX ADMIN — NICOTINE 1 PATCH: 21 PATCH, EXTENDED RELEASE TRANSDERMAL at 08:09

## 2023-06-04 RX ADMIN — AMLODIPINE BESYLATE 10 MG: 10 TABLET ORAL at 07:46

## 2023-06-04 RX ADMIN — NICOTINE POLACRILEX 2 MG: 2 GUM, CHEWING ORAL at 14:40

## 2023-06-04 RX ADMIN — ROSUVASTATIN CALCIUM 10 MG: 10 TABLET, FILM COATED ORAL at 07:46

## 2023-06-04 RX ADMIN — OLANZAPINE 5 MG: 5 TABLET, FILM COATED ORAL at 18:00

## 2023-06-04 RX ADMIN — NICOTINE POLACRILEX 2 MG: 2 GUM, CHEWING ORAL at 20:12

## 2023-06-04 RX ADMIN — GABAPENTIN 1200 MG: 600 TABLET, FILM COATED ORAL at 07:46

## 2023-06-04 RX ADMIN — DOXYCYCLINE HYCLATE 100 MG: 50 CAPSULE ORAL at 07:46

## 2023-06-04 RX ADMIN — OLANZAPINE 20 MG: 20 TABLET, FILM COATED ORAL at 22:31

## 2023-06-04 RX ADMIN — BUSPIRONE HYDROCHLORIDE 10 MG: 10 TABLET ORAL at 07:46

## 2023-06-04 RX ADMIN — CLONAZEPAM 0.5 MG: 0.5 TABLET ORAL at 07:46

## 2023-06-04 RX ADMIN — GABAPENTIN 1200 MG: 600 TABLET, FILM COATED ORAL at 20:12

## 2023-06-04 RX ADMIN — INSULIN ASPART 2 UNITS: 100 INJECTION, SOLUTION INTRAVENOUS; SUBCUTANEOUS at 17:45

## 2023-06-04 RX ADMIN — SENNOSIDES AND DOCUSATE SODIUM 1 TABLET: 50; 8.6 TABLET ORAL at 08:48

## 2023-06-04 RX ADMIN — NICOTINE POLACRILEX 2 MG: 2 GUM, CHEWING ORAL at 18:05

## 2023-06-04 RX ADMIN — INSULIN ASPART 6 UNITS: 100 INJECTION, SOLUTION INTRAVENOUS; SUBCUTANEOUS at 11:55

## 2023-06-04 RX ADMIN — NICOTINE POLACRILEX 2 MG: 2 GUM, CHEWING ORAL at 10:40

## 2023-06-04 RX ADMIN — NICOTINE POLACRILEX 2 MG: 2 GUM, CHEWING ORAL at 12:14

## 2023-06-04 RX ADMIN — INSULIN ASPART 6 UNITS: 100 INJECTION, SOLUTION INTRAVENOUS; SUBCUTANEOUS at 11:54

## 2023-06-04 RX ADMIN — CLONAZEPAM 0.5 MG: 0.5 TABLET ORAL at 20:12

## 2023-06-04 RX ADMIN — BUSPIRONE HYDROCHLORIDE 10 MG: 10 TABLET ORAL at 14:41

## 2023-06-04 RX ADMIN — INSULIN ASPART 5 UNITS: 100 INJECTION, SOLUTION INTRAVENOUS; SUBCUTANEOUS at 08:00

## 2023-06-04 RX ADMIN — LAMOTRIGINE 25 MG: 25 TABLET ORAL at 07:46

## 2023-06-04 RX ADMIN — PRAZOSIN HYDROCHLORIDE 1 MG: 1 CAPSULE ORAL at 22:32

## 2023-06-04 RX ADMIN — DEXTROAMPHETAMINE SULFATE, DEXTROAMPHETAMINE SACCHARATE, AMPHETAMINE SULFATE AND AMPHETAMINE ASPARTATE 20 MG: 5; 5; 5; 5 CAPSULE, EXTENDED RELEASE ORAL at 07:46

## 2023-06-04 RX ADMIN — GABAPENTIN 1200 MG: 600 TABLET, FILM COATED ORAL at 14:40

## 2023-06-04 RX ADMIN — QUETIAPINE FUMARATE 100 MG: 100 TABLET ORAL at 20:16

## 2023-06-04 RX ADMIN — BUSPIRONE HYDROCHLORIDE 10 MG: 10 TABLET ORAL at 20:12

## 2023-06-04 RX ADMIN — OMEPRAZOLE 20 MG: 20 CAPSULE, DELAYED RELEASE ORAL at 07:46

## 2023-06-04 ASSESSMENT — ACTIVITIES OF DAILY LIVING (ADL)
ADLS_ACUITY_SCORE: 45
HYGIENE/GROOMING: HANDWASHING;INDEPENDENT
ADLS_ACUITY_SCORE: 45
LAUNDRY: UNABLE TO COMPLETE
ADLS_ACUITY_SCORE: 45
ORAL_HYGIENE: INDEPENDENT
ADLS_ACUITY_SCORE: 45
ADLS_ACUITY_SCORE: 45
DRESS: SCRUBS (BEHAVIORAL HEALTH)

## 2023-06-04 NOTE — PLAN OF CARE
Goal Outcome Evaluation:       Patient appeared to sleep 7 hours this night shift.  No prns or snacks given or requested.  No concerns were reported or noted.

## 2023-06-04 NOTE — PLAN OF CARE
"  Problem: Suicidal Behavior  Goal: Suicidal Behavior is Absent or Managed  Outcome: Progressing   Goal Outcome Evaluation:    Plan of Care Reviewed With: patient      Patient was out on the lounge, took shower this morning, and appears with a bright affect. He requested for the writer if he could be assisted with cutting his hair. His blood sugar pre-breakfast was 243 and pre-lunch was 271. Patient continues endorse feeling depressed, anxiety and hearing voices that are telling him to harm himself, but does contract for safety. He was complaning of having dry mouth, and complaining of being constipated. He was given PRN Senna to help constipation.      Patient started to complain about feeling dizzy. Vital's and blood sugar was checked. Vitals were with in normal limit, and blood sugar was 271.    Patient states \" I am feeling a lot better today, I spoke to my mom and my friend that helped a lot\"           Blood pressure 127/87, pulse 109, temperature 97.7  F (36.5  C), temperature source Oral, resp. rate 16, weight 112.5 kg (248 lb), SpO2 93 %, not currently breastfeeding.             "

## 2023-06-04 NOTE — PLAN OF CARE
"  Problem: Adult Behavioral Health Plan of Care  Goal: Optimized Coping Skills in Response to Life Stressors  Outcome: Not Progressing   Goal Outcome Evaluation:    Plan of Care Reviewed With: patient      Patient has been expressing having thoughts of wanting to harm himself, and the voices are telling him to end his life. Patient did not engage in injuries behaviors but continues to talk about wanting to end his life. He did contract for safety and that he will seek out staff.He states he has a high anxiety and always depressed . Patient was telling writer and another staff that he doesn't want to take Insulin anymore. Patient was educated on the importance of Insulin regimen, and was able to take the Insulin coverage that was scheduled.     Patient's blood sugar pre-dinner was 334 and before bed time was 336 . Patient was compliant with taking medications, and Insulin coverage.    Patient tends to seek out staff frequently, and appears with flat affect. Patient states \" you are the challenging one do you know that... but I like you as my nurse you have been helpful\". Patient has been appreciative of writer. Patient was encouraged to engage in activities and that seems to help patient.    No other behaviors /outbursts during this shift .    Blood pressure (!) 140/93, pulse 92, temperature 98.4  F (36.9  C), temperature source Temporal, resp. rate 16, weight 108.6 kg (239 lb 6.4 oz), SpO2 96 %, not currently breastfeeding.               "

## 2023-06-05 ENCOUNTER — TRANSCRIBE ORDERS (OUTPATIENT)
Dept: OTHER | Age: 33
End: 2023-06-05

## 2023-06-05 DIAGNOSIS — F43.10 PTSD (POST-TRAUMATIC STRESS DISORDER): ICD-10-CM

## 2023-06-05 DIAGNOSIS — F25.0 SCHIZOAFFECTIVE DISORDER, BIPOLAR TYPE (H): ICD-10-CM

## 2023-06-05 DIAGNOSIS — F41.9 ANXIETY: ICD-10-CM

## 2023-06-05 DIAGNOSIS — F19.10 POLYSUBSTANCE ABUSE (H): ICD-10-CM

## 2023-06-05 DIAGNOSIS — F29 PSYCHOSIS (H): ICD-10-CM

## 2023-06-05 DIAGNOSIS — F90.9 ADHD (ATTENTION DEFICIT HYPERACTIVITY DISORDER): ICD-10-CM

## 2023-06-05 DIAGNOSIS — F64.9 GENDER DYSPHORIA: ICD-10-CM

## 2023-06-05 DIAGNOSIS — R45.851 SUICIDAL IDEATION: Primary | ICD-10-CM

## 2023-06-05 LAB
GLUCOSE BLDC GLUCOMTR-MCNC: 236 MG/DL (ref 70–99)
GLUCOSE BLDC GLUCOMTR-MCNC: 244 MG/DL (ref 70–99)
GLUCOSE BLDC GLUCOMTR-MCNC: 268 MG/DL (ref 70–99)
GLUCOSE BLDC GLUCOMTR-MCNC: 298 MG/DL (ref 70–99)

## 2023-06-05 PROCEDURE — 124N000002 HC R&B MH UMMC

## 2023-06-05 PROCEDURE — 93005 ELECTROCARDIOGRAM TRACING: CPT

## 2023-06-05 PROCEDURE — 250N000013 HC RX MED GY IP 250 OP 250 PS 637: Performed by: PSYCHIATRY & NEUROLOGY

## 2023-06-05 PROCEDURE — 99232 SBSQ HOSP IP/OBS MODERATE 35: CPT | Performed by: PSYCHIATRY & NEUROLOGY

## 2023-06-05 PROCEDURE — 93010 ELECTROCARDIOGRAM REPORT: CPT | Performed by: INTERNAL MEDICINE

## 2023-06-05 PROCEDURE — 90791 PSYCH DIAGNOSTIC EVALUATION: CPT | Mod: TS | Performed by: COUNSELOR

## 2023-06-05 PROCEDURE — G0177 OPPS/PHP; TRAIN & EDUC SERV: HCPCS

## 2023-06-05 RX ORDER — CLONAZEPAM 0.5 MG/1
0.5 TABLET ORAL AT BEDTIME
Status: DISCONTINUED | OUTPATIENT
Start: 2023-06-05 | End: 2023-06-09 | Stop reason: HOSPADM

## 2023-06-05 RX ORDER — CLONAZEPAM 0.5 MG/1
0.25 TABLET ORAL AT BEDTIME
Status: DISCONTINUED | OUTPATIENT
Start: 2023-06-05 | End: 2023-06-05

## 2023-06-05 RX ORDER — CLONAZEPAM 0.5 MG/1
0.5 TABLET ORAL DAILY
Status: DISCONTINUED | OUTPATIENT
Start: 2023-06-06 | End: 2023-06-05

## 2023-06-05 RX ORDER — OLANZAPINE 15 MG/1
15 TABLET ORAL AT BEDTIME
Status: DISCONTINUED | OUTPATIENT
Start: 2023-06-05 | End: 2023-06-09 | Stop reason: HOSPADM

## 2023-06-05 RX ORDER — CLONAZEPAM 0.5 MG/1
0.25 TABLET ORAL DAILY
Status: DISCONTINUED | OUTPATIENT
Start: 2023-06-06 | End: 2023-06-08

## 2023-06-05 RX ADMIN — NICOTINE POLACRILEX 2 MG: 2 GUM, CHEWING ORAL at 10:40

## 2023-06-05 RX ADMIN — CLONIDINE HYDROCHLORIDE 0.1 MG: 0.1 TABLET ORAL at 17:45

## 2023-06-05 RX ADMIN — LAMOTRIGINE 25 MG: 25 TABLET ORAL at 08:16

## 2023-06-05 RX ADMIN — CLONAZEPAM 0.5 MG: 0.5 TABLET ORAL at 21:49

## 2023-06-05 RX ADMIN — BENZOYL PEROXIDE: 50 LOTION TOPICAL at 21:08

## 2023-06-05 RX ADMIN — OMEPRAZOLE 20 MG: 20 CAPSULE, DELAYED RELEASE ORAL at 08:16

## 2023-06-05 RX ADMIN — DOXYCYCLINE HYCLATE 100 MG: 50 CAPSULE ORAL at 08:17

## 2023-06-05 RX ADMIN — BUSPIRONE HYDROCHLORIDE 10 MG: 10 TABLET ORAL at 14:28

## 2023-06-05 RX ADMIN — DOXYCYCLINE HYCLATE 100 MG: 50 CAPSULE ORAL at 21:50

## 2023-06-05 RX ADMIN — QUETIAPINE FUMARATE 150 MG: 100 TABLET ORAL at 21:47

## 2023-06-05 RX ADMIN — NICOTINE POLACRILEX 2 MG: 2 GUM, CHEWING ORAL at 21:24

## 2023-06-05 RX ADMIN — TRETINOIN: 0.5 CREAM TOPICAL at 22:04

## 2023-06-05 RX ADMIN — BUSPIRONE HYDROCHLORIDE 10 MG: 10 TABLET ORAL at 08:17

## 2023-06-05 RX ADMIN — NICOTINE 1 PATCH: 21 PATCH, EXTENDED RELEASE TRANSDERMAL at 08:17

## 2023-06-05 RX ADMIN — DEXTROAMPHETAMINE SULFATE, DEXTROAMPHETAMINE SACCHARATE, AMPHETAMINE SULFATE AND AMPHETAMINE ASPARTATE 20 MG: 5; 5; 5; 5 CAPSULE, EXTENDED RELEASE ORAL at 08:16

## 2023-06-05 RX ADMIN — NICOTINE POLACRILEX 2 MG: 2 GUM, CHEWING ORAL at 08:16

## 2023-06-05 RX ADMIN — INSULIN ASPART 6 UNITS: 100 INJECTION, SOLUTION INTRAVENOUS; SUBCUTANEOUS at 08:33

## 2023-06-05 RX ADMIN — NICOTINE POLACRILEX 2 MG: 2 GUM, CHEWING ORAL at 15:16

## 2023-06-05 RX ADMIN — INSULIN ASPART 7 UNITS: 100 INJECTION, SOLUTION INTRAVENOUS; SUBCUTANEOUS at 17:29

## 2023-06-05 RX ADMIN — INSULIN ASPART 5 UNITS: 100 INJECTION, SOLUTION INTRAVENOUS; SUBCUTANEOUS at 12:39

## 2023-06-05 RX ADMIN — GABAPENTIN 1200 MG: 600 TABLET, FILM COATED ORAL at 08:17

## 2023-06-05 RX ADMIN — CLONAZEPAM 0.5 MG: 0.5 TABLET ORAL at 08:17

## 2023-06-05 RX ADMIN — OLANZAPINE 15 MG: 15 TABLET, FILM COATED ORAL at 21:49

## 2023-06-05 RX ADMIN — BUSPIRONE HYDROCHLORIDE 10 MG: 10 TABLET ORAL at 21:48

## 2023-06-05 RX ADMIN — ROSUVASTATIN CALCIUM 10 MG: 10 TABLET, FILM COATED ORAL at 08:16

## 2023-06-05 RX ADMIN — AMLODIPINE BESYLATE 10 MG: 10 TABLET ORAL at 08:17

## 2023-06-05 RX ADMIN — PRAZOSIN HYDROCHLORIDE 1 MG: 1 CAPSULE ORAL at 21:50

## 2023-06-05 RX ADMIN — GABAPENTIN 1200 MG: 600 TABLET, FILM COATED ORAL at 14:28

## 2023-06-05 RX ADMIN — GABAPENTIN 1200 MG: 600 TABLET, FILM COATED ORAL at 21:47

## 2023-06-05 RX ADMIN — INSULIN ASPART 6 UNITS: 100 INJECTION, SOLUTION INTRAVENOUS; SUBCUTANEOUS at 12:38

## 2023-06-05 RX ADMIN — NICOTINE POLACRILEX 2 MG: 2 GUM, CHEWING ORAL at 17:40

## 2023-06-05 ASSESSMENT — ACTIVITIES OF DAILY LIVING (ADL)
ADLS_ACUITY_SCORE: 45
DRESS: SCRUBS (BEHAVIORAL HEALTH);INDEPENDENT
HYGIENE/GROOMING: INDEPENDENT
ADLS_ACUITY_SCORE: 45
ORAL_HYGIENE: INDEPENDENT
ADLS_ACUITY_SCORE: 45

## 2023-06-05 ASSESSMENT — ANXIETY QUESTIONNAIRES
2. NOT BEING ABLE TO STOP OR CONTROL WORRYING: NEARLY EVERY DAY
1. FEELING NERVOUS, ANXIOUS, OR ON EDGE: NEARLY EVERY DAY
6. BECOMING EASILY ANNOYED OR IRRITABLE: NOT AT ALL
4. TROUBLE RELAXING: NEARLY EVERY DAY
7. FEELING AFRAID AS IF SOMETHING AWFUL MIGHT HAPPEN: NEARLY EVERY DAY
GAD7 TOTAL SCORE: 15
3. WORRYING TOO MUCH ABOUT DIFFERENT THINGS: NEARLY EVERY DAY
5. BEING SO RESTLESS THAT IT IS HARD TO SIT STILL: NOT AT ALL
GAD7 TOTAL SCORE: 15

## 2023-06-05 ASSESSMENT — COLUMBIA-SUICIDE SEVERITY RATING SCALE - C-SSRS
6. HAVE YOU EVER DONE ANYTHING, STARTED TO DO ANYTHING, OR PREPARED TO DO ANYTHING TO END YOUR LIFE?: YES
5. HAVE YOU STARTED TO WORK OUT OR WORKED OUT THE DETAILS OF HOW TO KILL YOURSELF? DO YOU INTEND TO CARRY OUT THIS PLAN?: YES
2. HAVE YOU ACTUALLY HAD ANY THOUGHTS OF KILLING YOURSELF IN THE PAST MONTH?: YES
4. HAVE YOU HAD THESE THOUGHTS AND HAD SOME INTENTION OF ACTING ON THEM?: NO
3. HAVE YOU BEEN THINKING ABOUT HOW YOU MIGHT KILL YOURSELF?: YES
1. IN THE PAST MONTH, HAVE YOU WISHED YOU WERE DEAD OR WISHED YOU COULD GO TO SLEEP AND NOT WAKE UP?: YES

## 2023-06-05 ASSESSMENT — PATIENT HEALTH QUESTIONNAIRE - PHQ9: SUM OF ALL RESPONSES TO PHQ QUESTIONS 1-9: 16

## 2023-06-05 NOTE — CARE PLAN
06/05/23 1509   Group Therapy Session   Group Attendance attended group session   Total Time (minutes) 120   Total # Attendees 3,3,1   Group Type task skill;psychoeducation;life skill   Group Topic Covered coping skills/lifestyle management;problem-solving;emotions/expression;self-care activities   Group Session Detail topic group, OT clinic, individual session   Patient Response/Contribution cooperative with task;able to recall/repeat info presented;expressed readiness to alter behaviors;organized;expressed understanding of topic     Topic Group (5722-9422):  Pt completed a personal crisis plan where she wrote about, and discussed the following:  -I know I'm triggered, or not doing well when I notice:  Anxious all the time, not using coping skills, sleeping too much/or not very much, hopelessness, helplessness  -Ways to distract myself:  Art, music, walking dog, medical cannabis, PRNs, friends  -Safe people to reach out to:  Mom, dad, roommate, friends, staff  -Ways to keep self/space safe:  Reach out for help, keep organized, medication, use skills  -Resources to get myself care:   , Hospital, Psychiatrist, [facility], Therapist  Personal strengths:  Kind, humorous, compassionate, strong, artistic    OT clinic ([id number])  Pt shared vision board with writer that was completed last week.  Spent time working on scratch art project while discussing the positive impact of crafts on his life.  Pt shares he wishes his home manager would provide craft supplies, as others would find it beneficial as well.    Individual time ([id number]) not billed  Met with pt to review individual work done on anxiety management workbook and 'WRAP' mental health workbook.  Pt discussed work he doing with therapist outside of the hospital, and ongoing desire for stable mental health for long term goals of working for the [employer].

## 2023-06-05 NOTE — PLAN OF CARE
Problem: Sleep Disturbance  Goal: Adequate Sleep/Rest  Outcome: Progressing   Goal Outcome Evaluation:    Patient appeared to sleep 6.5 hours this night shift.  No prns or snacks given or requested.  No concerns were reported or noted.  EKG today.

## 2023-06-05 NOTE — CONSULTS
"Adult Diagnostic Assessment Update    [facility]  Provider [facility]  [facility]         PATIENT'S NAME: Ayana Prasad  PREFERRED NAME: Prakash  PRONOUNS: he/him/his     MRN:   [id number]  :   1990   ACCT. NUMBER: [id number]  DATE OF SERVICE: 23  START TIME: 2:20pm  END TIME: 2:48pm  PREFERRED PHONE: [phone]  May we leave a program related message: unknown  SERVICE MODALITY:  In-person    Provider reviewed initial DA dated:  10/19/2022    Chief Complaint:   The reason for seeking services at this time is: \" Because I want to live a better life. I started being manic and that didn't go so well. They took me off the medication that made me manic and then I got depressed. SI came along with that and it just snowballed. \"   The problem(s) began \"First part of May\". Patient has attempted to resolve these concerns in the past through medication management and therapy and ARMS worker\".    Patient would like the following family members involved in services mother by including them in \"I will sign an VAL\".    Current Stressors/Losses/Concerns: \"my brother passed away\"    Patient reports current meds as:   Outpatient Medications Marked as Taking for the 23 encounter (Hospital Encounter)   Medication Sig     ACE/ARB/ARNI NOT PRESCRIBED (INTENTIONAL) Please choose reason not prescribed from choices below.     amLODIPine (NORVASC) 10 MG tablet Take 1 tablet (10 mg) by mouth daily     amphetamine-dextroamphetamine (ADDERALL XR) 20 MG 24 hr capsule Take 1 capsule (20 mg) by mouth daily     ASPIRIN NOT PRESCRIBED (INTENTIONAL) Please choose reason not prescribed from choices below.     aspirin-acetaminophen-caffeine (EXCEDRIN MIGRAINE) 250-250-65 MG tablet Take 1 tablet by mouth daily as needed for headaches     benzoyl peroxide 5 % external liquid Apply topically daily     blood glucose (NO BRAND SPECIFIED) test strip Use to test blood sugar one " "times daily and as needed. To accompany: Blood Glucose Monitor Brands: per insurance.     clonazePAM (KLONOPIN) 1 MG tablet Take 1 tablet (1 mg) by mouth daily as needed (severe anxiety only)     cloNIDine (CATAPRES) 0.1 MG tablet Take 1 tablet (0.1 mg) by mouth daily as needed (anxiety)     doxycycline monohydrate (MONODOX) 100 MG capsule Take 1 capsule (100 mg) by mouth 2 times daily     gabapentin (NEURONTIN) 600 MG tablet Take 2 tablets (1,200 mg) by mouth 3 times daily     insulin glargine (LANTUS PEN) 100 UNIT/ML pen Take 15 units daily.  Take half dose, 7 units, on days you do not eat     needle, disp, 18G X 1\" MISC Use to draw up weekly testosterone dose     Needle, Disp, 25G X 5/8\" MISC Use to inject weekly Testosterone dose     OLANZapine (ZYPREXA) 20 MG tablet Take 1 tablet (20 mg) by mouth At Bedtime     OLANZapine (ZYPREXA) 5 MG tablet Take 1 tablet (5 mg) by mouth At Bedtime Together with one 20 mg tablet to make total of 25 mg at bedtime.     omeprazole (PRILOSEC) 20 MG DR capsule Take 1 capsule (20 mg) by mouth daily     ondansetron (ZOFRAN ODT) 4 MG ODT tab Take 1 tablet (4 mg) by mouth every 8 hours as needed for nausea     rosuvastatin (CRESTOR) 10 MG tablet Take 1 tablet (10 mg) by mouth daily     Semaglutide, 1 MG/DOSE, (OZEMPIC) 4 MG/3ML pen Inject 1 mg Subcutaneous every 7 days     syringe, disposable, 1 ML MISC Use to draw up and administer weekly testosterone dose     testosterone cypionate (DEPOTESTOSTERONE) 200 MG/ML injection Inject 0.1 mLs (20 mg) Subcutaneous once a week     thin (NO BRAND SPECIFIED) lancets Use with lanceting device to check blood sugars once per day. To accompany: Blood Glucose Monitor Brands: per insurance.     tretinoin (RETIN-A) 0.05 % external cream Apply topically At Bedtime Pea-sized amount to whole face       Medication Adherence:  Patient reports taking prescribed medications as prescribed    Patient Allergies:    Allergies   Allergen Reactions     Haldol " [Haloperidol] Other (See Comments)     Makes patient very angry and anxious     Adhesive Tape Hives     Percocet [Oxycodone-Acetaminophen] Nausea and Vomiting     Prednisone Other (See Comments) and Hives     Suicidal ideation     Risperidone Other (See Comments)     Tramadol Hcl Nausea and Vomiting     Droperidol Anxiety     Seroquel [Quetiapine] Palpitations     Spent 2 weeks in the hospital due to having seroquel, caused palpitations and QT prolongation       Medical History:    Past Medical History:   Diagnosis Date     ADHD (attention deficit hyperactivity disorder)      Bipolar 1 disorder      Borderline personality disorder      Cauda equina syndrome      Chronic low back pain      Depression      Diabetes mellitus, type 2 (H) 1/19/2023     GERD (gastroesophageal reflux disease)      h/o TBI (traumatic brain injury)      Hypertension, unspecified type 12/16/2021     Marginal corneal ulcer, left 07/17/2015     Nephrolithiasis      obesity      Polysubstance abuse - methamphetamine, hallucinagen, heroin, marijuana     currently in remission     PONV (postoperative nausea and vomiting)      PTSD (post-traumatic stress disorder)        Since the initial DA, Ayana:  reports changes in his medical history.  Diabetes  denies changes in his living situation.    denies changes in his employment.    denies changes regarding financial concerns or gambling behavior.   denies  changes in education status.   denies ability to meet his basic needs.   Since the initial DA, the Ayana denies changes with his relationships/support system.       Significant Losses / Trauma / Abuse / Neglect Issues:   Since the initial DA, Ayana reports new losses/trauma/abuse/neglect issues. Brother passed away    Substance Use History:   Patient does not report use of substances since the initial DA.     Substances Used: none   Based on the negative CAGE score and clinical interview there  are not indications of drug or alcohol  abuse.      Current Mental Status Exam:   Appearance:  Appropriate    Eye Contact:  Good   Psychomotor:  Normal       Gait / station:  no problem  Attitude / Demeanor: Cooperative   Speech      Rate / Production: Normal/ Responsive      Volume:  Normal  volume      Language:  no problems  Mood:   Normal  Affect:   Appropriate    Thought Content: Clear   Thought Process: Coherent       Associations: No loosening of associations  Insight:   Good   Judgment:  Intact   Orientation:  All  Attention/concentration: Good    Rating Scales:    PHQ9:        3/16/2023     1:18 PM 5/17/2023    11:53 AM 6/5/2023     2:00 PM   PHQ-9 SCORE   PHQ-9 Total Score MyChart 8 (Mild depression) 9 (Mild depression)    PHQ-9 Total Score 8 9 16   ;    GAD7:        10/31/2022     5:45 PM 5/17/2023    11:54 AM 6/5/2023     2:00 PM   AVA-7 SCORE   Total Score 11 (moderate anxiety) 15 (severe anxiety)    Total Score 11 15 15     CGI:     First:Considering your total clinical experience with this particular patient population, how severe are the patient's symptoms at this time?: 7 (5/27/2023  1:09 PM)  ;    Most recentCompared to the patient's condition at the START of treatment, this patient's condition is: 4 (5/27/2023  1:09 PM)        Safety Assessment:  Current Safety Concerns:  [place] Suicide Severity Rating Scale (Lifetime/Recent)      10/31/2022    12:00 PM 12/19/2022    11:00 AM 5/24/2023     5:33 PM 5/24/2023     8:00 PM 5/26/2023    10:54 PM 5/27/2023    12:00 PM 6/5/2023     2:00 PM   [place] Suicide Severity Rating (Lifetime/Recent)   Wish to be Dead (Lifetime)      Yes    Q1 Wished to be Dead (Past Month)   yes  yes yes yes   Q2 Suicidal Thoughts (Past Month)   yes  yes yes yes   Q3 Suicidal Thought Method   yes  yes yes yes   Q4 Suicidal Intent without Specific Plan   no  yes no no   Q5 Suicide Intent with Specific Plan   yes  yes yes yes   Q6 Suicide Behavior (Lifetime)   yes  yes yes yes   Within the Past 3 Months?   no  yes yes  yes   Level of Risk per Screen   high risk  high risk high risk high risk   1. Wish to be Dead (Lifetime)    Y      1. Wish to be Dead (Past 1 Month)    Y      2. Non-Specific Active Suicidal Thoughts (Lifetime)    Y      2. Non-Specific Active Suicidal Thoughts (Past 1 Month)    Y      3. Active Suicidal Ideation with any Methods (Not Plan) Without Intent to Act (Lifetime)    Y      3. Active Suicidal Ideation with any Methods (Not Plan) Without Intent to Act (Past 1 Month)    Y      4. Active Suicidal Ideation with Some Intent to Act, Without Specific Plan (Lifetime)    Y      4. Active Suicidal Ideation with Some Intent to Act, Without Specific Plan (Past 1 Month)    Y      5. Active Suicidal Ideation with Specific Plan and Intent (Lifetime)    Y      5. Active Suicidal Ideation with Specific Plan and Intent (Past 1 Month)    Y      Active Suicidal Ideation with Specific Plan and Intent Description (Past 1 Month)    Overdose on psych meds      Most Severe Ideation Rating (Lifetime)    5      Most Severe Ideation Rating (Past 1 Month)    5      Frequency (Lifetime)    1      Frequency (Past 1 Month)    4      Duration (Lifetime)    1      Duration (Past 1 Month)    5      Controllability (Lifetime)    3      Controllability (Past 1 Month)    4      Deterrents (Lifetime)    3      Deterrents (Past 1 Month)    5      Reasons for Ideation (Lifetime)    3      Reasons for Ideation (Past 1 Month)    5      Actual Attempt (Lifetime)    Y      Total Number of Actual Attempts (Lifetime)    3      Actual Attempt (Past 3 Months)    N      Has subject engaged in non-suicidal self-injurious behavior? (Lifetime)    N      Interrupted Attempts (Lifetime)    N      Aborted or Self-Interrupted Attempt (Lifetime)    N      Preparatory Acts or Behavior (Lifetime)    Y      Preparatory Acts or Behavior (Past 3 Months)    Y      Preparatory Acts or Behavior Description (Past 3 Months)    Researching how to get Lithium toxicity from  overdosing      Actual Lethality/Medical Damage Code (Most Recent Attempt)    0      Potential Lethality Code (Most Recent Attempt)    0      Most Lethal Attempt Date  6/10/2022        Actual Lethality/Medical Damage Code (Most Lethal Attempt) 1 1  1      Potential Lethality Code (Most Lethal Attempt)    0      Actual Lethality/Medical Damage Code (Initial/First Attempt)    0      Potential Lethality Code (Initial/First Attempt)    0      Calculated C-SSRS Risk Score (Lifetime/Recent)    High Risk            3/3/2021     7:00 PM 10/18/2022     5:40 PM 6/5/2023     2:00 PM   CAGE-AID Total Score   Total Score 0 1    1 0   Total Score MyChart  1 (A total score of 2 or greater is considered clinically significant)        CAGE-AID score  > 1 is a positive screen, suggesting further discussion is needed to determine if evaluation for alcohol or substance abuse is appropriate.  A score > 2 is considered clinically significant, suggesting further evaluation of alcohol or substance-related problems is indicated.      Patient reports the following protective factors: dedication to family/friends and pets    See Preliminary Treatment Plan for Safety and Risk Management Plan      Review of Symptoms per patient report:  Depression:     Lack of interest, Difficulties concentrating, Suicidal ideation, Feelings of hopelessness, Feelings of helplessness, Low self-worth, Ruminations, Feeling sad, down, or depressed and Withdrawn  Bella:             Elevated mood, Grandiosity, Racing thoughts, Increased activity, Decreased need for sleep, Restlessness, Distractibility and Impulsiveness  Psychosis:       Auditory hallucinations  Anxiety:           Excessive worry, Nervousness, Physical complaints, such as headaches, stomachaches, muscle tension, Separation anxiety, Social anxiety, Psychomotor agitation, Ruminations and Poor concentration  Panic:              Palpitations, Tremors and Shortness of breath  Post Traumatic Stress  Disorder:  Reexperiencing of trauma, Avoids traumatic stimuli, Increased arousal, Impaired functioning and Nightmares   Eating Disorder:          No Symptoms  ADD / ADHD:              Inattentive, Difficulties listening, Poor task completion, Poor organizational skills, Distractibility, Forgetful, Impulsive and Restlessness/fidgety  Conduct Disorder:       No symptoms  Autism Spectrum Disorder:     No symptoms  Obsessive Compulsive Disorder:       No Symptoms      Patient reports the following compulsive behaviors and treatment history: Shoplifting - has not had treatment..      Diagnostic Criteria:   Generalized Anxiety Disorder  A. Excessive anxiety and worry about a number of events or activities (such as work or school performance).   B. The person finds it difficult to control the worry.   - Restlessness or feeling keyed up or on edge.    - Being easily fatigued.    - Difficulty concentrating or mind going blank.    - Irritability.    - Muscle tension.    - Sleep disturbance (difficulty falling or staying asleep, or restless unsatisfying sleep).   D. The focus of the anxiety and worry is not confined to features of an Axis I disorder.  E. The anxiety, worry, or physical symptoms cause clinically significant distress or impairment in social, occupational, or other important areas of functioning.   F. The disturbance is not due to the direct physiological effects of a substance (e.g., a drug of abuse, a medication) or a general medical condition (e.g., hyperthyroidism) and does not occur exclusively during a Mood Disorder, a Psychotic Disorder, or a Pervasive Developmental Disorder. Bipolar I Manic Episode  A. A distinct period of abnormally and persistently elevated, expansive, or irritable mood, lasting at least 1 week (or any duration if hospitalization is necessary).   B. During the period of mood disturbance, three (or more) of the following symptoms (four if the mood is only irritable) have persisted and  have been present to a significant degree:   - inflated self-esteem or grandiosity    - decreased need for sleep (e.g., feels rested after only 3 hours of sleep)    - more talkative than usual or pressure to keep talking    - flight of ideas or subjectivie experience that thoughts are racing   - distractibility   - increase in goal-directed activity   - excessive involvement in pleasurable activities that have a high potential for painful consequences, such as spending money or sexual indiscretion.  C. The mood disturbance is sufficiently severe to cause marked impairment in social or occupational functioning or to necessitate hospitalization to prevent harm to self or others, or there are severe psychotic features  D. The symptoms are not attributable to the physiologicial effects of a substance or to another medical condition  E. The episode is sufficiently severe enough to cause marked impairment in social or occupational functioning or to necessitate hospitalization to prevent harm to self or others, or there are psychotic features  F. The symptoms are not due to the direct physiological effects of a substance (eg, a drug of abuse, a medication, or other treatment) or a general medical condition (eg, hyperthyroidism). Major Depressive Disorder  A) Recurrent episode(s) - symptoms have been present during the same 2-week period and represent a change from previous functioning 5 or more symptoms (required for diagnosis)   - Depressed mood. Note: In children and adolescents, can be irritable mood.     - Diminished interest or pleasure in all, or almost all, activities.    - Psychomotor activity agitation.    - Fatigue or loss of energy.    - Feelings of worthlessness or inappropriate and excessive guilt.    - Diminished ability to think or concentrate, or indecisiveness.    - Recurrent thoughts of death (not just fear of dying), recurrent suicidal ideation without a specific plan, or a suicide attempt or a specific  plan for committing suicide.   B) The symptoms cause clinically significant distress or impairment in social, occupational, or other important areas of functioning  C) The episode is not attributable to the physiological effects of a substance or to another medical condition  D) The occurence of major depressive episode is not better explained by other thought / psychotic disorders Borderline Personality Disorder   A pervasive pattern of instability of interpersonal relationships, self-image, and affects, and marked impulsivity beginning by early adulthood and present in a variety of contexts, as indicated by five (or more) of the following:   - Frantic efforts to avoid real or imagined abandonment.    - A pattern of unstable and intense interpersonal relationships characterized by alternating between extremes of idealization and devaluation.    - Identity disturbance: markedly and persistently unstable self-image or sense of self.    - Recurrent suicidal behavior, gestures, or threats, or self-mutilating behavior.   - Affective instability due to a marked reactivity of mood. Schizoaffective Disorder Bipolar Type - This subtype applies if a manic episode is part of the presentation. Major depressive episodes may also occur. and B.  Delusions or hallucinations for 2 or more week in the absence of a major mood episode (depressive or manic) during the lifetime duration of the illness. Post- Traumatic Stress Disorder  A. The person has been exposed to a traumatic event in which both of the following were present:     (1) the person experienced, witnessed, or was confronted with an event or events that involved actual or threatened death or serious injury, or a threat to the physical integrity of self or others  B. The traumatic event is persistently reexperienced in one (or more) of the following ways:     - Recurrent and intrusive distressing recollections of the event, including images, thoughts, or perceptions. Note:  In young children, repetitive play may occur in which themes or aspects of the trauma are expressed.      - Recurrent distressing dreams of the event. Note: In children, there may be frightening dreams without recognizable content.      - Acting or feeling as if the traumatic event were recurring (includes a sense of reliving the experience, illusions, hallucinations, and dissociative flashback episodes, including those that occur on awakening or when intoxicated). Note: In young children, trauma-specific reenactment may occur.   C. Persistent avoidance of stimuli associated with the trauma and numbing of general responsiveness (not present before the trauma), as indicated by three (or more) of the following:     - Efforts to avoid thoughts, feelings, or conversations associated with the trauma.      - Efforts to avoid activities, places, or people that arouse recollections of the trauma.      - Inability to recall an important aspect of the trauma.      - Markedly diminished interest or participation in significant activities.      - Feeling of detachment or estrangement from others.      - Restricted range of affect (e.g., unable to have loving feelings).   D. Persistent symptoms of increased arousal (not present before the trauma), as indicated by two (or more) of the following:     - Difficulty falling or staying asleep.      - Irritability or outbursts of anger.      - Difficulty concentrating.      - Hypervigilance.      - Exaggerated startle response.   E. Duration of the disturbance is more than 1 month.  F. The disturbance causes clinically significant distress or impairment in social, occupational, or other important areas of functioning.      Functional Status:  Patient reports the following functional impairments: self-care.     WHODAS:       7/16/2021     2:54 PM 10/18/2022     5:40 PM   WHODAS 2.0 Total Score   Total Score 22 28    28   Total Score MyChart  28     Programmatic care:  Current LOCUS was  assigned and patient needs the following level of care based on score 17  .     LOCUS Worksheet     Name: Ayana Prasad MRN: [id number]    : 1990      Gender:  Male (born female)    PMI:  [id number]   Provider Name: CINTHYA Park, JESUS     Provider NPI:  [id number]      Actual level of Care Provided:  DA Update    Service(s) receiving or referred to:  Day Treatment    Reason for Variance:  For symptom management due to worsening mental health symptoms and passive suicidal ideation.        Rating completed by: CINTHYA Park LADC        I. Risk of Harm:   3      Moderate Risk of Harm    II. Functional Status:   2      Mild Impairment    III. Co-Morbidity:   1      No Co-Morbidity    IV - A. Recovery Environment - Level of Stress:   3      Moderately Stress Environment    IV - B. Recovery Environment - Level of Support:   2      Supportive Environment    V. Treatment and Recovery History:   3      Moderate to Equivocal Response to Treatment and Recovery Management    VI. Engagement and Recovery Project:   3      Limited Engagement and Recovery       17 Composite Score    Level of Care Recommendation:   17 to 19       High Intensity Community Based Services        Clinical Summary:  1. Reason for assessment: inpatient referral.  2. Psychosocial, Cultural and Contextual Factors: None.  3. Principal DSM5 Diagnoses  (Sustained by DSM5 Criteria Listed Above):   296.32 (F33.1) Major Depressive Disorder, Recurrent Episode, Moderate _  300.02 (F41.1) Generalized Anxiety Disorder  309.81 (F43.10) Posttraumatic Stress Disorder (includes Posttraumatic Stress Disorder for Children 6 Years and Younger)  With dissociative symptoms.  4. Other Diagnoses that is relevant to services:   295.70  (F25) Schizoaffective Disorder Bipolar Type  Other Unspecified and Specified Bipolar and Related Disorder 296.80 (F31.9) Unspecified Bipolar and Related Disorder.  5. Provisional Diagnosis: none  6. Prognosis: Relieve Acute  Symptoms.  7. Likely consequences of symptoms if not treated: If untreated, patient's mental health will likely deteriorate and may require a higher level of care.  8. Patient's strengths/skills/abilities include:  has a previous history of therapy, support of family, friends and providers and willing to ask questions .   9. Patient's resources for support: Parent, care team, Providers    Recommendations:     1. Plan for Safety and Risk Management:   A safety and risk management plan has been developed including: Patient consented to co-developed safety plan on 06/05/2023.  Safety and risk management plan was reviewed.   Patient agreed to use safety plan should any safety concerns arise.  A copy was made available to the patient..          Report to child / adult protection services was NA.     2. Patient's identified none.     3. Initial Treatment will focus on:    Depressed Mood   Anxiety  Mood Instability.     4. Resources/Service Plan:    services are not indicated.   Modifications to assist communication are not indicated.   Additional disability accommodations are not indicated.      5. Collaboration:   Collaboration / coordination of treatment will be initiated with the following support professionals: Group Home.      6.  Referrals:   The following referral(s) will be initiated (list in order of priority or patient preference): Day Treatment. Next Scheduled Appointment: TBD.     A Release of Information has been obtained for the following: None.    7.  KIERRA:  Discussed Discussed the general effects of drugs and alcohol on health and well-being.     8. Records:   They were reviewed at time of assessment.   Information in this assessment was obtained from the medical record and  provided by patient who is a good historian.    Patient will have open access to their mental health medical record.     Clinical Substantiation  Summary: Patient is a 32 year old transgender male with a history of Anxiety  "Disorder, Depression, Bipolar disorder, Schizoaffective disorder, PTSD,   presents for an assessment of mental health needs. Patient has a  history of psychiatric hospitalizations. Patient has a history of SI and SA, but denies SIB. Denies any concerns with current alcohol use.  Patient would benefit from additional support and psycho education to manage current mental health symptoms. The patient's acute suicide risk was determined to be high due to the following factors: hx suicidal thoughts, history of suicide attempts. Patient is not currently under the influence of alcohol or illicit substances, denies experiencing command hallucinations, and has no immediate access to firearms. The patient's acute risk could be higher if noncompliant with their follow-up appointments or using illicit substances or alcohol. Protective factors include: dedication to family or friends    Placement/Program Barriers Identified: None    Referral: [facility] and Addiction Assessment [place]    Outpatient Mental Health Services - Adult Safety Plan      PATIENT'S NAME: Ayana Prasad  MRN:   [id number]    Step 1: Warning signs / cues (Thoughts, images, mood, situation, behavior) that a crisis may be developing:    Thoughts: \"I don't matter\", \"People would be better off without me\", \"I'm a burden\", \"I can't do this anymore\" and \"I just want this to end\"    Images: obsessive thoughts of death or dying and flashbacks    Thinking Processes: ruminations (can't stop thinking about my problems), racing thoughts and intrusive thoughts (bothersome, unwanted thoughts that come out of nowhere)    Mood: worsening depression, hopelessness and helplessness    Behaviors: isolating/withdrawing , not taking care of myself and not taking care of my responsibilities    Situations: relationship problems and trauma      Step 2: Coping strategies - Things I can do to take my mind off of my problems " "without contacting another person (relaxation technique, physical activity):    Distress Tolerance Strategies:  play with my pet  and paced breathing/progressive muscle relaxation    Physical Activities: meditation, deep breathing and stretching     Focus on helpful thoughts:  \"This is temporary\", \"I will get through this\", \"It always passes\" and \"Ride the wave\"    Step 3: People and social settings that provide distraction:       [place]     Step 4: Remind myself of people and things that are important to me and worth living for:  My parents, my dog, fishing and the outdoors.      Step 5: When I am in crisis, I can ask these people to help me use my safety plan:   Name: Negra Prasad Phone: [phone]       Step 6: Making the environment safe:     remove alcohol, remove drugs and be around others    Step 7: Professionals or agencies I can contact during a crisis:      Suicide Prevention Lifeline: 4-882-852-TALK (9497)    Crisis Text Line Service (available 24 hours a day, 7 days a week): Text [place] to 016967    Call  **CRISIS (218037) from a cell phone to talk to a team of professionals who can help you.    Crisis Services By [facility]: Phone Number:   [place]     [phone]   [place]    [phone]   [facility]    [phone]   [place]    [phone]   [place]    [phone]   [place] [phone]   [place]     [phone]       Call 911 or go to my nearest emergency department.     I helped develop this safety plan and agree to use it when needed.  I have been given a copy of this plan.      Completed by Provider Name/ Credentials:  Og Tan, [facility]  Today s date:  6/5/2023    Ayana VINCENT Prasad wasprovided a copy of this Safety Plan.    Adapted from Safety Plan Template 2008 Vane Rivers and Eron Jaimes is reprinted with the express permission of the authors.  No portion of the Safety Plan Template may be reproduced without the express, written permission.  You can contact the " authors at [email or web address] or [email or web address]

## 2023-06-05 NOTE — PROGRESS NOTES
"[facility], [place]   Psychiatric Progress Note  Hospital Day: 9        Interim History:   The patient's care was discussed with the treatment team during the daily team meeting and/or staff's chart notes were reviewed.  Staff report patient continues to report intermittent command AH telling him to harm himself. Notes that protective factors include his dog and his parents. Pt tends to seek out staff frequently. Sleeping well per chart review. No agitation or aggression. NO SIB.      Upon interview, Prakash says that Saturday was \"hard, but Sunday was better.\" Notes that he was experiencing suicidal thinking and feeling depressed on Saturday. Zyprexa helped him, as well as moving to pod 2 \"where it calmer so I could settle down a bit.\" Notes that unit milieu has been acute and overstimulating at times. He feels he will be ready for discharge in the near future so that he may have access to other coping strategies he does not have access to in restrictive hospital setting. Discussed dose reduction in Klonopin and he is requesting an increase in Seroquel to assist with sleep. Agreed with this plan and ongoing cross titration from Zyprexa to Seroquel.    Suicidal ideation: Chronic, passive SI though no current intent or plan. Denying SIB urges today.     Homicidal ideation: denies current or recent homicidal ideation or behaviors.    Psychotic symptoms: Patient reporting command AH telling him to kill himself and that he is stupid. Patient denies, VH, paranoia, delusions.     Medication side effects reported: No significant side effects other than \"the munchies\" from Seroquel.    Acute medical concerns: none, longstanding lower back pain    Other issues reported by patient: Patient had no further questions or concerns.           Medications:       amLODIPine  10 mg Oral Daily     amphetamine-dextroamphetamine  20 mg Oral Daily     benzoyl peroxide   Topical Daily     busPIRone  10 mg Oral " TID     clonazePAM  0.5 mg Oral BID     doxycycline hyclate  100 mg Oral BID     gabapentin  1,200 mg Oral TID     insulin aspart  6 Units Subcutaneous Daily with supper     insulin aspart  6 Units Subcutaneous Daily with lunch     insulin aspart  6 Units Subcutaneous Daily with breakfast     insulin aspart  1-10 Units Subcutaneous TID AC     insulin aspart  1-7 Units Subcutaneous At Bedtime     insulin glargine  18 Units Subcutaneous QAM AC     lamoTRIgine  25 mg Oral Daily     melatonin  5 mg Oral At Bedtime     nicotine  1 patch Transdermal Daily    And     nicotine   Transdermal Q8H [facility]     OLANZapine  20 mg Oral At Bedtime     omeprazole  20 mg Oral Daily     prazosin  1 mg Oral At Bedtime     QUEtiapine  100 mg Oral At Bedtime     rosuvastatin  10 mg Oral Daily     Semaglutide (1 MG/DOSE)  1 mg Subcutaneous Q7 Days     testosterone cypionate  20 mg Subcutaneous Weekly     tretinoin   Topical At Bedtime          Allergies:     Allergies   Allergen Reactions     Haldol [Haloperidol] Other (See Comments)     Makes patient very angry and anxious     Adhesive Tape Hives     Percocet [Oxycodone-Acetaminophen] Nausea and Vomiting     Prednisone Other (See Comments) and Hives     Suicidal ideation     Risperidone Other (See Comments)     Tramadol Hcl Nausea and Vomiting     Droperidol Anxiety     Seroquel [Quetiapine] Palpitations     Spent 2 weeks in the hospital due to having seroquel, caused palpitations and QT prolongation          Labs:     Recent Results (from the past 24 hour(s))   Glucose by meter    Collection Time: 06/04/23 10:37 AM   Result Value Ref Range    GLUCOSE BY METER POCT 271 (H) 70 - 99 mg/dL   Glucose by meter    Collection Time: 06/04/23  5:23 PM   Result Value Ref Range    GLUCOSE BY METER POCT 171 (H) 70 - 99 mg/dL   Glucose by meter    Collection Time: 06/04/23  8:26 PM   Result Value Ref Range    GLUCOSE BY METER POCT 297 (H) 70 - 99 mg/dL          Psychiatric Examination:     /83 (BP  Location: Right arm, Patient Position: Sitting, Cuff Size: Adult Large)   Pulse 92   Temp 98.1  F (36.7  C) (Oral)   Resp 20   Wt 112.5 kg (248 lb)   LMP  (LMP Unknown)   SpO2 100%   BMI 40.95 kg/m    Weight is 248 lbs 0 oz  Body mass index is 40.95 kg/m .    Weight over time:  Vitals:    05/27/23 1225 05/30/23 0748 06/04/23 0730   Weight: 107.9 kg (237 lb 12.8 oz) 108.6 kg (239 lb 6.4 oz) 112.5 kg (248 lb)       Orthostatic Vitals       Most Recent      Standing Orthostatic /73 05/31 0824    Standing Orthostatic Pulse (bpm) 117 05/31 0824            Cardiometabolic risk assessment. 06/01/23      Reviewed patient profile for cardiometabolic risk factors    Date taken /Value  REFERENCE RANGE   Abdominal Obesity  (Waist Circumference)   See nursing flowsheet Women ?35 in (88 cm)   Men ?40 in (102 cm)      Triglycerides  Triglycerides   Date Value Ref Range Status   05/28/2023 240 (H) <150 mg/dL Final   04/28/2021 317 (H) <150 mg/dL Final       ?150 mg/dL (1.7 mmol/L) or current treatment for elevated triglycerides   HDL cholesterol  HDL Cholesterol   Date Value Ref Range Status   04/28/2021 37 (L) >40 mg/dL Final     Direct Measure HDL   Date Value Ref Range Status   05/28/2023 35 (L) >=40 mg/dL Final   ]   Women <50 mg/dL (1.3 mmol/L) in women or current treatment for low HDL cholesterol  Men <40 mg/dL (1 mmol/L) in men or current treatment for low HDL cholesterol     Fasting plasma glucose (FPG) Lab Results   Component Value Date     06/01/2023     05/05/2023     05/05/2023     05/19/2021      FPG ?100 mg/dL (5.6 mmol/L) or treatment for elevated blood glucose   Blood pressure  BP Readings from Last 3 Encounters:   06/04/23 132/83   05/22/23 138/88   04/19/23 122/78    Blood pressure ?130/85 mmHg or treatment for elevated blood pressure   Family History  See family history     Mental Status Exam:  Appearance: awake, alert and adequately groomed  Attitude:  cooperative  Eye  "Contact:  good  Mood:  \"better\"  Affect:  mood congruent, blunted  Speech:  clear, coherent  Language: fluent and intact in English  Psychomotor, Gait, Musculoskeletal:  no evidence of tardive dyskinesia, dystonia, or tics  Throught Process:  logical, linear and goal oriented  Associations:  no loose associations  Thought Content:  passive suicidal ideation present and auditory hallucinations present. Likely present intermittently at baseline.   Insight:  fair  Judgement:  fair  Oriented to:  time, person, and place  Attention Span and Concentration:  intact  Recent and Remote Memory:  intact  Fund of Knowledge:  appropriate    Clinical Global Impressions  First:  Considering your total clinical experience with this particular patient population, how severe are the patient's symptoms at this time?: 7 (05/27/23 1309)  Compared to the patient's condition at the START of treatment, this patient's condition is: 4 (05/27/23 1309)  Most recent:  Considering your total clinical experience with this particular patient population, how severe are the patient's symptoms at this time?: 7 (05/27/23 1309)  Compared to the patient's condition at the START of treatment, this patient's condition is: 4 (05/27/23 1309)           Precautions:     Behavioral Orders   Procedures     Code 1 - Restrict to Unit     Routine Programming     As clinically indicated     Status 15     Every 15 minutes.     Suicide precautions     Patients on Suicide Precautions should have a Combination Diet ordered that includes a Diet selection(s) AND a Behavioral Tray selection for Safe Tray - with utensils, or Safe Tray - NO utensils            Diagnoses:     Schizoaffective disorder, bipolar type (H)  Borderline personality disorder  Moderate episode of recurrent major depressive disorder (H)  Suicidal ideation  Lab test negative for COVID-19 virus         Assessment & Plan:     Assessment and hospital summary:  This patient is a 32 year old transgender " male with history of mood disorder, psychosis and substance use who presented to ED with SI in context of increased medical concerns with abnormal LFTs and uncontrolled BG, possible medication induced haven/hypomania with medication changes and elevated anxiety. Medically cleared in ED, UDS positive for amphetamines (on adderall) and cannabinoids; was evaluated by psychiatry consult service. PTA olanzapine and clonazepam increased, prazosin started for PTSD and lamotrigine started for mood. Admitted to 12N as voluntary patient and overflow patient for high acuity unit. PTA medications continued along with changes made to medications in ED with exception of Adderall discontinued for further evaluation by primary team. Pt also requests to take testosterone IM on 5/27 as has missed dose for 2 weeks due to increased depression/amotivation. Pt unsure of goals for hospitalization other to have SI, anxiety improved, likely will return to . Inpatient psychiatric hospitalization is warranted at this time for safety, stabilization, and possible adjustment in medications.    5/27: Testerone IM restarted    5/28: will resume PTA Adderall XR 20mg daily for ADHD, pt reports this has been well tolerated for >20 years with no change in psychosis, anxiety or mood state noted with changes in this medication, monitoring closely. Continue PTA clonazepam at increased dose of 1mg BID for anxiety.      5/30: Start BuSpar 10 mg 3 times daily for anxiety and Start Seroquel 50 mg 4 times daily as needed for anxiety    5/31:He says that Seroquel works better for him, QT QTc was normal on EKG, will order Seroquel (100 mg) at night and decrease Zyprexa dose to 20 mg QHS.  He gained weight on Zyprexa and blood glucoses more difficult to control on Zyprexa.  He says that uncontrolled blood glucose affects his mood 2. We discussed decreasing Klonopin to 0.5 mg twice daily for few days and then discontinuing it.  Controlled substance is not the  best option for him considering his history of chemical dependency. We also discussed addictive potential of Adderall, versus Strattera.     6/1-6/2: Continue current medications without changes.     6/3: Internal Medicine following peripherally for blood sugar management. Blood sugars remain elevated in the 200-300 range.  Currently on high intensity sliding scale, prandial insulin (3 units with each meal) and Lantus 18 units every morning.   -increase prandial insulin coverage to 6 units   -Continue current Lantus dose     Today's Changes:  Contact Nohemi about individual therapy and whether she would have capacity to do this and/or her thoughts about it being therapeutic for patient  Continue Klonopin taper and reduce dose by 25% (total of 0.75 mg daily)  Increase Seroquel to 150 mg at bedtime  Reduce Zyprexa to 15 mg QHS  Increase Lamictal on 6/9  Day treatment referral placed.     Target psychiatric symptoms and interventions:  Continue Zyprexa 15 mg nightly for mood stabilization and psychosis  Continue Seroquel 150 mg nightly for augmentation of Zyprexa, sleep, anxiety monitor  Continue melatonin 5 mg at bedtime  Seroquel 50 mg 4 times daily as needed for anxiety  BuSpar 10 mg 3 times daily for anxiety  Zyprexa 10 mg daily as needed p.o. or IM for psychosis, agitation and aggression  Prazosin 1 mg nightly for nightmares of PTSD  Depo testosterone injection 20 mg subcu weekly, first dose on 5/27/2023  If given IM, it  has to be given in glluleus   Lamictal 25 mg daily for mood stabilization, will titrate (initiated on 5/26)  Klonopin 0.25 mg daily and 0.5 mg QHS  Neurontin 1200 mg 3 times daily for anxiety and mood stabilization  Adderall XR 20 mg daily for ADHD    Risks, benefits, and alternatives discussed at length with patient.     Acute Medical Problems and Treatments:  Acute medical concerns:  Medical concerns:   1) Type 2 DM on insulin, poorly controlled, last A1c 9.1 on 5/5, had insulin dose  increased on 5/22  -continue Lantus 15 units every morning before breakfast  -also per chart was prescribed semaglutide weekly, last dose 5/16, can use home supply if GH brings in  -BG checks ordered along with hypoglycemia medications   -IM consult placedfor further management, appreciate assistance, per note 6/1:      Brief Medicine Progress Note     Internal medicine following peripheral for blood sugar management in setting of poorly controlled diabetes mellitus, type 2.  Hgb A1c on 5/5/23 was 9.1%  Prior to admission, patient managed with Semaglutide 1mg every Tuesday and Lantus 15 units every morning. Medicine team started him on sliding scale insulin 5/27, with an increase to high sliding scale on 5/30. Blood sugars remain elevated in the 200s. On average, has been receiving 4-7 units sliding scale per parameters since being placed on the high intensity sliding scale.      -Increase morning Lantus from 15 units to 18 units (this will start 6/2)   -add prandial insulin, 2 units novolog with meals.   -continue glucose checks before meals and at bedtime.      Internal medicine will continue to follow      Gricelda Coello PA-C       2) Elevated LFTs, hx of fatty liver per chart   -repeat CMP shows LFTs downtrending   -was evaluated by PCP on 5/22, recommended avoiding acetaminophen and alcohol with GI referral and Abd US ordered   -will defer additional work up to IM      3) Hypertriglyceridemia history   -ordered lipid panel as above, per chart has declined to take fish oil when recommended by PCP  -lipid panel with (H) and HDL 35(L) otherwise WNL   -recommend follow up with PCP as outpatient      4) Leukocytosis on admit WBC 11.4(H)  -repeat WBC this AM normalized at 8.7  -no other evidence of infection   -continue to monitor     5) HTN  -monitor BPs  -continued PTA amlodipine   -follow up with PCP     6) Acne  -continued PTA doxycycline, benzoyl peroxide and tretinoin  -follow up with PCP/derm on  discharge      7) Sinus tachycardia and borderline prolonged QTc of 476ms  -per EKG on 5/27  -continue to monitor vitals, electrolytes were WNL on admission   -recommend rechecking EKG periodically and avoiding QTc prolonging agents as able       Pertinent labs/imaging:  TSH WNL and lipid panel with (H) and HDL 35(L) otherwise WNL; repeat WBC WNL; repeat CMP showed LFTs down trending with AST 76(H) and ALT 82(H), Ca now WNL and glucose 154(H); EKG with Sinus tachycardia with  and QTc of 476ms     Behavioral/Psychological/Social:  - Encourage unit programming    Safety:  - Continue precautions as noted above  - Status 15 minute checks  - Suicide precautions    Legal Status: Court hold. Filled for MI commitment through [facility] on 6/1.    Disposition Plan   Reason for ongoing admission: poses an imminent risk to self  Discharge location: group home  Discharge Medications: not ordered  Follow-up Appointments: not scheduled    Entered by: Tessa Mendoza MD on 6/5/2023 at 8:13 AM

## 2023-06-05 NOTE — PLAN OF CARE
Problem: Adult Behavioral Health Plan of Care  Goal: Plan of Care Review  Outcome: Progressing  Flowsheets  Taken 6/4/2023 2203  Plan of Care Reviewed With: patient  Overall Patient Progress: improving  Patient Agreement with Plan of Care: agrees  Taken 6/4/2023 1839  Patient Agreement with Plan of Care: agrees   Goal Outcome Evaluation:    Plan of Care Reviewed With: patient Plan of Care Reviewed With: patient    Overall Patient Progress: improvingOverall Patient Progress: improving     Patient in the milieu throughout the evening social upon approach mainly with staff. Patient upon approach full range in affect, calm and cooperative with verbal assessment. Patient stating overall feeling as though he continues to progress and feels ready for discharge, accepting of care plan and commitment process. Patient reporting continued auditory hallucinations directing to kill self though denies any active plan or intent and contracts for plan of safety to speak with staff if worsening. Patient reports continued depression of 8/10 and anxiety 7/10. Patient early in the evening reporting agitation related to hearing other patients talking and upset about hospitalization. Patient requested and received PRN Zyprexa 5 mg which he later reported was therapeutic.     Patient cooperative with vital sign assessments which were stable. Patient blood glucose elevated throughout the evening, 243 at dinner, and 271 at snack. Patient independent with requesting and receptive of HS medications and ordered diabetic regimen. Patient reports dry mouth as a side effect to the medications. Patient independent with identifying needs and completing ADL's.

## 2023-06-05 NOTE — PLAN OF CARE
"Goal Outcome Evaluation:    Pt is med compliant and cooperative. Pt attended group.  Pt denies all mental health concerns except admits to hearing voices stating they are, \"command hallucinations.\"  Pt denies any other concerns.                          "

## 2023-06-05 NOTE — PLAN OF CARE
"Problem: Suicidal Behavior  Goal: Suicidal Behavior is Absent or Managed  Outcome: Progressing   Goal Outcome Evaluation:    Pt occasionally out in milieu. Rated depression 3/10, linked this to his hospitalization and the court process.  Pt initially rated anxiety 8/10, stated that he is worrying about his court process and that he misses his dog. Stated anxiety has been high all day and has been high throughout his hospitalization. Offered the potential of PRN medication to address anxiety and pt refused. Writer asked pt if there was anything that helps and pt requested the use of his gel pens and identified coloring as a means to reduce his anxiety. Pt also showed writer work pages that he had received from OT, stating that these help him. Shortly after this, pt came to writer stating that his anxiety was even higher. When asked, pt rated anxiety 10/10. Pt could not identify a reason when asked, and requested medication to address his anxiety. Give PRN Clonidine. Pt later verbally reported that he was \"feeling better,\" but did not provide a number rating to his anxiety.     Pt denied visual hallucinations, but stated that he hears command hallucinations, eg that he should jump off a bridge. Pt stated that he always has these voices, but that his SI is passive and that he does not have a plan. No SIB behavior was observed or reported. Denied HI.     Later in the shift, pt stated that he will refuse all medical treatment, and that everyone in the hospital are \"fake bitches,\" with pt being frustrated about healthcare notes that he became aware of. Pt was re-approached approximately an hour later and refused all medications again. Shortly after this, pt approached writer and stated that he would take his medication. Pt apologized and expressed gratitude numerous times for care. For most of the shift, pt was pleasant and cooperative, save approximately a hour of frustration.     Blood sugars: 298 (given 13 units (6 " scheduled + 7 sliding scale); 236 (given 2 units)

## 2023-06-05 NOTE — PROGRESS NOTES
Welcome to [facility] Adult Mental Health Outpatient Programs    Thank you for choosing [facility]!    Congratulations! You have completed the first step in your recovery by participating in a Diagnostic Assessment. With your input, [facility] L[facility], is recommending the following level of care and services to best meet your needs.    Managing mental health symptoms while balancing life stressors can be difficult. Our mental health programming will provide the group therapy, education, skills, and support needed to improve your well-being while living a healthy and manageable lifestyle.    All our Adult Mental Health Outpatient Programs are group-based and allow you to meet with peers who are trying to manage similar symptoms and/or life circumstances in a safe and therapeutic setting.    Recommendations and Plan:      Level of Care:  Dual Disorder Program Intensive Outpatient   (DDP-IOP) [phone]    Start Date:  TBD    Schedule:  TBD    If you were placed on a Wait List following your Diagnostic Assessment, please expect the following:    You will receive a phone call from the program within a few days to discuss a start date and plan.      Please contact the program at the number listed above if you are choosing to be removed from the Wait List, need to reschedule your start or if you have any additional questions.    Type of Participation:  On-site    We are located at: [facility], [facility] in the [facility] at: [address] [place] [address].          Parking is available in the  Yellow  ramp located across from the [facility] entrance as shown above. You will receive a parking voucher upon arrival that discounts your cost to $2 per day.     Lunch is NOT provided. You may bring a lunch or purchase one in our cafeteria or at [employer] located within the hospital.     BEFORE arriving at the hospital, please consider the following: If you answer  YES to any of the following questions, please contact the program nurse for further screening and instructions: [phone]    1. Have you been exposed to anyone who has COVID in the last 10 days?  2. Have you taken a COVID test in the last 10 days? If so, what are the results, or are they pending?  3. Do you have any of the following symptoms, new or worsening: cough, fever, loss of taste/smell, sore throat, GI symptoms, rash, etc?  4. Have you travelled internationally/domestically in the last 30 days?    In general...  -please stay home if you are ill.  -practice good hand washing.  -cover your cough.  -anyone can mask at any time.      To provide the best group experience for everyone, we expect confidentiality, regular attendance, and respectful participation by all.        1. What is said in group, stays in group. (All personal or identifying information shared in group should be kept confidential and not shared with anyone).    NOTE: Audio or Visual recording are not allowed and may result in immediate discharge from the program.      2. Please be sitting upright in a comfortable position. This will maximize engagement and participation for everyone.    3. Please refrain from smoking, eating, or engaging in other distracting activities during groups.  Smoking is allowed outside on the street side walk (not in the drop-off area or near the building).    4. Facilitators may provide reminders of the above expectations during group as needed.    5. Please do NOT cancel your appointments through [facility].    If you are going to be absent, please contact the program number and leave a message so staff will not expect you.    You will NOT be billed for any sessions you do not attend.    See additional attendance and program participation information below.     Dual Disorder Intensive Outpatient Program Overview (DDP-IOP) [phone]      Multi-disciplinary team includes a licensed psychotherapist, registered nurse,  and occupational therapist.        This level of care is less intensive than an inpatient or partial hospitalization program and provides more support than traditional individual psychotherapy. Mental health and substance use concerns will be addressed together for a successful treatment experience.    Length of Stay Typically, patients attend 6-8 weeks of programming, although this can vary depending on specific patient needs.   Treatment team Licensed mental health professionals including psychotherapists, a psychiatrist and/or psychologist and a Licensed Alcohol and Drug Counselor. Some groups are co-facilitated groups with Registered Nurse or an Occupational Therapist as part of a multi-disciplinary team.     Group-based programming - 3 groups per day; 4 days per week  - 50 minutes per group  - 10-minute break between each group  - Facilitated by a member of the treatment team    Group Psychotherapy is provided daily, allowing time to check-in, process concerns and share feedback/support. All other groups include a topic and provide opportunities for education, skill building, discussion, and support.      Example Group Topics Admission IOP topics are listed above and will align with additional group topics covered throughout the 12-week combined programming.     Topics may include:      Behavior Activation, Cognitive Restructuring: Cognitive Distortions, Communication Skills/ Areas for Self-Improvement. Coping Skills, Discharge Planning, Dimensions of Wellness, Emotions, Forgiveness, Functional Nutrition, Grief, Life Transitions, Leisure Exploration, Life Skills, Listening for Meaning, Medication Assessment, Mental Health Social Support, Mindfulness, Mood Tracking/Triggers, Motivation and Procrastination, Relationship, Relaxation Techniques, Self-Awareness, Self-Confidence, Self-Care, Self-Compassion, Self-Esteem and Self-compassion, Shame and Guilt, Sleep hygiene, SMART Goals, Stages to [place] with Stress,  Stigma, Strategies to Improve Motivation, Symptom Awareness, Time Management, Trauma Triggers, Values, Wellness                   Psychiatrist and/or Psychologist             - Patients will see the program psychiatrist and/or a psychologist for and initial visit and follow-up every 30 days.   - If seeing the psychiatrist, they will partner with you and your other providers for medication management, as needed.   o Psychiatry services will be billed separately.   o For insurance purposes, please coordinate with team to prevent scheduling to see the program psychiatrist on the same day you see your outpatient psychiatrist  - If seeing the psychologist, they will provide individual therapy. Medication management will not be provided.     NOTE: Either provider will continue to assess your progress and coordinate with the treatment team to determine when you may be ready for completion.  This is a program requirement.      Individual Therapy See psychologist services above.    Physical Health Screen You will meet with a program nurse within the first week to complete a brief physical health screen. This is a program requirement.     FMLA or Short-term Disability - We encourage you to request completion of paperwork from your long-term providers.   - If this is not an option, please notify the program nurse as soon as possible.  - We will do our best to help you coordinate completion of paperwork.   - Medical Record requests: please contact Release of Information  at [phone] and allow up to 14 days for records once the authorization for release is received.    Treatment and Discharge Planning Patients meet individually with team members for treatment planning.     - We will help you develop goals and identify strengths and/or barriers.   - We will discuss your program participation, progress, and discharge planning.   - We are available to assist with referrals and service coordination; please let us know how best  we can support your specific needs.   - You will receive copies of your treatment plan and discharge summary via [employer].                  An Important Note from your Program Treatment Team...    Welcome! We are happy to be partnering with you on your recovery journey. Our experienced clinicians have developed programming based on current research and evidence-based practice to provide you with high quality mental health treatment.     Attendance and Program Participation:    You will participate in a variety of groups each day. Our goal is to provide you with a rich and varied therapeutic healing environment knowing patients have unique experiences and preferred ways of sharing, learning, processing, and engaging in the recovery process.    You are expected to attend all groups on time.    If you are going to be late or absent, please let a team member know the reason. You can also leave that same information at the number listed above.  o In the event of an unreported absence, we will reach out to you. If we are unable to reach you, know that we may call your emergency contact.  o We always attempt to establish contact with your emergency contact prior to initiating a wellness check.    While it is important that you continue to attend appointments with your individual therapist, psychiatrist, and other medical providers, you are encouraged to schedule these appointments outside of group hours whenever possible.    If your attendance becomes a concern, your treatment team will have a discussion with you and may start an Attendance Agreement. Following your Attendance Agreement is required to prevent early discharge from the program.    To get the most out of the program and to support your wellbeing we require that you do not use any chemicals, tobacco or vape products on screen or during program hours. The team is available to assist you if this is something you struggle with.    Please be mindful that you are  part of a group; therefore, we ask that you be respectful of other group members' needs and do not use derogatory, offensive, or discriminatory language.  o You will be removed from group if suspected of being under the influence of substances or if using language that negatively impacts the group.  o Your treatment team will address any concerns with you and offer recommendations. A Behavior Agreement may be started. Following your Behavior Agreement is required to prevent early discharge from the program.    Communication with other group members outside of group is discouraged. This can affect your treatment and the way the group functions.  o If you choose to share contact information with group peers AFTER you are discharged from the program, this decision is completely independent of any program coordination or support.  o While in the program, participants may not engage in any sexual or intimate relationships with each other outside of group. This may result in immediate discharge of both participants from the program.     Trauma:    Many of our patients have experienced trauma. You are not alone. This can be challenging for patients to manage. All our team members have been trained in Trauma Informed Care and will provide you with the education, skills training, and support that you need to stabilize your symptoms.  o Specific details and descriptions of abuse, assault, violence, neglect, etc., are best processed in individual therapy as to avoid triggering other group members.  o Discussing how traumatic experiences have impacted beliefs about self/others/world and practicing skills to cope with symptoms is very appropriate for group therapy.     We look forward to working together to support your mental health.     We love feedback from our patients about our program!  Please take a few moments to respond to surveys sent out by [employer].  This will help us continue to make improvements and to keep  "the things that are   important to you!        [facility]    Outpatient Mental Health Services - Adult Safety Plan      PATIENT'S NAME: Ayana Prasad  MRN:   [id number]    Step 1: Warning signs / cues (Thoughts, images, mood, situation, behavior) that a crisis may be developing:    Thoughts: \"I don't matter\", \"People would be better off without me\", \"I'm a burden\", \"I can't do this anymore\" and \"I just want this to end\"    Images: obsessive thoughts of death or dying and flashbacks    Thinking Processes: ruminations (can't stop thinking about my problems), racing thoughts and intrusive thoughts (bothersome, unwanted thoughts that come out of nowhere)    Mood: worsening depression, hopelessness and helplessness    Behaviors: isolating/withdrawing , not taking care of myself and not taking care of my responsibilities    Situations: relationship problems and trauma      Step 2: Coping strategies - Things I can do to take my mind off of my problems without contacting another person (relaxation technique, physical activity):    Distress Tolerance Strategies:  play with my pet  and paced breathing/progressive muscle relaxation    Physical Activities: meditation, deep breathing and stretching     Focus on helpful thoughts:  \"This is temporary\", \"I will get through this\", \"It always passes\" and \"Ride the wave\"    Step 3: People and social settings that provide distraction:       [place]     Step 4: Remind myself of people and things that are important to me and worth living for:  My parents, my dog, fishing and the outdoors.      Step 5: When I am in crisis, I can ask these people to help me use my safety plan:   Name: Negra Prasad Phone: [phone]       Step 6: Making the environment safe:     remove alcohol, remove drugs and be around others    Step 7: Professionals or agencies I can contact during a crisis:      Suicide Prevention Lifeline: " 6-537-655-TALK (0461)    Crisis Text Line Service (available 24 hours a day, 7 days a week): Text [place] to 468773    Call  **CRISIS (001674) from a cell phone to talk to a team of professionals who can help you.    Crisis Services By [facility]: Phone Number:   [place]     [phone]   [place]    [phone]   [facility]    [phone]   [place]    [phone]   [place]    [phone]   [place] [phone]   [place]     [phone]       Call 911 or go to my nearest emergency department.     I helped develop this safety plan and agree to use it when needed.  I have been given a copy of this plan.      Completed by Provider Name/ Credentials:  Og Tan, [facility]  Today s date:  6/5/2023    Ayana Prasad wasprovided a copy of this Safety Plan.    Adapted from Safety Plan Template 2008 Vane Rivers and Eron Jaimes is reprinted with the express permission of the authors.  No portion of the Safety Plan Template may be reproduced without the express, written permission.  You can contact the authors at [email or web address] or [email or web address]

## 2023-06-05 NOTE — PLAN OF CARE
Assessment/Intervention/Current Symptoms and Care Coordination:  Met with team. Met with patient. Reviewed chart. Received court hearing notice, copy given to patient with  contact information highlighted as well as hearing dates and times. Copy put in chart.      Discharge Plan or Goal:  Discharge back to [facility] in [place].      Barriers to Discharge:  Discharge pending symptom stabilization and medication management.      Referral Status:  Referred to [facility] Adult Outpatient Mental Health Day Treatment on      Legal Status:  Court Hold    [employer]  File Number: [id number]  Examination & Preliminary Hearin/7 at 2:15pm  Final Hearin/12 at D    Contacts:  : Samantha Bell, [phone]  : Cristy at [facility], [phone] (VAL signed)   ARM: Deena at [employer], [phone] (VAL signed)   [facility] Director and Nurse: Domenica [phone] (VAL signed)   CADI worker: Pretty Guzman at [employer], [phone] (VAL signed)   Psychotherapist: Joshua at [employer], [phone] (VAL signed)      Upcoming Meetings and Dates/Important Information and next steps:  Psychiatrist follow-up scheduled   Examination and preliminary hearing

## 2023-06-06 LAB
ATRIAL RATE - MUSE: 101 BPM
DIASTOLIC BLOOD PRESSURE - MUSE: NORMAL MMHG
GLUCOSE BLDC GLUCOMTR-MCNC: 167 MG/DL (ref 70–99)
GLUCOSE BLDC GLUCOMTR-MCNC: 175 MG/DL (ref 70–99)
GLUCOSE BLDC GLUCOMTR-MCNC: 205 MG/DL (ref 70–99)
GLUCOSE BLDC GLUCOMTR-MCNC: 249 MG/DL (ref 70–99)
INTERPRETATION ECG - MUSE: NORMAL
P AXIS - MUSE: 62 DEGREES
PR INTERVAL - MUSE: 158 MS
QRS DURATION - MUSE: 98 MS
QT - MUSE: 374 MS
QTC - MUSE: 484 MS
R AXIS - MUSE: 11 DEGREES
SYSTOLIC BLOOD PRESSURE - MUSE: NORMAL MMHG
T AXIS - MUSE: 28 DEGREES
VENTRICULAR RATE- MUSE: 101 BPM

## 2023-06-06 PROCEDURE — 250N000013 HC RX MED GY IP 250 OP 250 PS 637: Performed by: PSYCHIATRY & NEUROLOGY

## 2023-06-06 PROCEDURE — 124N000002 HC R&B MH UMMC

## 2023-06-06 PROCEDURE — 250N000011 HC RX IP 250 OP 636: Performed by: PSYCHIATRY & NEUROLOGY

## 2023-06-06 PROCEDURE — G0177 OPPS/PHP; TRAIN & EDUC SERV: HCPCS

## 2023-06-06 RX ORDER — POLYETHYLENE GLYCOL 3350 17 G/17G
17 POWDER, FOR SOLUTION ORAL DAILY PRN
Status: DISCONTINUED | OUTPATIENT
Start: 2023-06-06 | End: 2023-06-09 | Stop reason: HOSPADM

## 2023-06-06 RX ADMIN — BUSPIRONE HYDROCHLORIDE 10 MG: 10 TABLET ORAL at 08:07

## 2023-06-06 RX ADMIN — INSULIN ASPART 6 UNITS: 100 INJECTION, SOLUTION INTRAVENOUS; SUBCUTANEOUS at 11:51

## 2023-06-06 RX ADMIN — Medication 0.25 MG: at 08:07

## 2023-06-06 RX ADMIN — GABAPENTIN 1200 MG: 600 TABLET, FILM COATED ORAL at 19:11

## 2023-06-06 RX ADMIN — GABAPENTIN 1200 MG: 600 TABLET, FILM COATED ORAL at 13:13

## 2023-06-06 RX ADMIN — OMEPRAZOLE 20 MG: 20 CAPSULE, DELAYED RELEASE ORAL at 08:06

## 2023-06-06 RX ADMIN — NICOTINE 1 PATCH: 21 PATCH, EXTENDED RELEASE TRANSDERMAL at 08:21

## 2023-06-06 RX ADMIN — DOXYCYCLINE HYCLATE 100 MG: 50 CAPSULE ORAL at 19:12

## 2023-06-06 RX ADMIN — Medication 2 SPRAY: at 22:12

## 2023-06-06 RX ADMIN — QUETIAPINE FUMARATE 150 MG: 100 TABLET ORAL at 21:59

## 2023-06-06 RX ADMIN — NICOTINE POLACRILEX 2 MG: 2 GUM, CHEWING ORAL at 09:17

## 2023-06-06 RX ADMIN — DOXYCYCLINE HYCLATE 100 MG: 50 CAPSULE ORAL at 08:06

## 2023-06-06 RX ADMIN — Medication 2 SPRAY: at 09:17

## 2023-06-06 RX ADMIN — OLANZAPINE 15 MG: 15 TABLET, FILM COATED ORAL at 22:00

## 2023-06-06 RX ADMIN — INSULIN ASPART 2 UNITS: 100 INJECTION, SOLUTION INTRAVENOUS; SUBCUTANEOUS at 17:49

## 2023-06-06 RX ADMIN — BUSPIRONE HYDROCHLORIDE 10 MG: 10 TABLET ORAL at 19:11

## 2023-06-06 RX ADMIN — AMLODIPINE BESYLATE 10 MG: 10 TABLET ORAL at 08:07

## 2023-06-06 RX ADMIN — NICOTINE POLACRILEX 2 MG: 2 GUM, CHEWING ORAL at 19:06

## 2023-06-06 RX ADMIN — ROSUVASTATIN CALCIUM 10 MG: 10 TABLET, FILM COATED ORAL at 08:07

## 2023-06-06 RX ADMIN — SENNOSIDES AND DOCUSATE SODIUM 1 TABLET: 50; 8.6 TABLET ORAL at 10:08

## 2023-06-06 RX ADMIN — NICOTINE POLACRILEX 2 MG: 2 GUM, CHEWING ORAL at 13:16

## 2023-06-06 RX ADMIN — POLYETHYLENE GLYCOL 3350 17 G: 17 POWDER, FOR SOLUTION ORAL at 17:53

## 2023-06-06 RX ADMIN — INSULIN ASPART 3 UNITS: 100 INJECTION, SOLUTION INTRAVENOUS; SUBCUTANEOUS at 11:55

## 2023-06-06 RX ADMIN — NICOTINE POLACRILEX 2 MG: 2 GUM, CHEWING ORAL at 12:17

## 2023-06-06 RX ADMIN — SENNOSIDES AND DOCUSATE SODIUM 1 TABLET: 50; 8.6 TABLET ORAL at 15:34

## 2023-06-06 RX ADMIN — DEXTROAMPHETAMINE SULFATE, DEXTROAMPHETAMINE SACCHARATE, AMPHETAMINE SULFATE AND AMPHETAMINE ASPARTATE 20 MG: 5; 5; 5; 5 CAPSULE, EXTENDED RELEASE ORAL at 08:07

## 2023-06-06 RX ADMIN — LAMOTRIGINE 25 MG: 25 TABLET ORAL at 08:07

## 2023-06-06 RX ADMIN — BUSPIRONE HYDROCHLORIDE 10 MG: 10 TABLET ORAL at 13:13

## 2023-06-06 RX ADMIN — INSULIN ASPART 5 UNITS: 100 INJECTION, SOLUTION INTRAVENOUS; SUBCUTANEOUS at 08:23

## 2023-06-06 RX ADMIN — NICOTINE POLACRILEX 2 MG: 2 GUM, CHEWING ORAL at 07:29

## 2023-06-06 RX ADMIN — NICOTINE POLACRILEX 2 MG: 2 GUM, CHEWING ORAL at 17:52

## 2023-06-06 RX ADMIN — ONDANSETRON 4 MG: 4 TABLET, ORALLY DISINTEGRATING ORAL at 17:02

## 2023-06-06 RX ADMIN — PRAZOSIN HYDROCHLORIDE 1 MG: 1 CAPSULE ORAL at 21:59

## 2023-06-06 RX ADMIN — CLONAZEPAM 0.5 MG: 0.5 TABLET ORAL at 21:59

## 2023-06-06 RX ADMIN — GABAPENTIN 1200 MG: 600 TABLET, FILM COATED ORAL at 08:14

## 2023-06-06 ASSESSMENT — ACTIVITIES OF DAILY LIVING (ADL)
ADLS_ACUITY_SCORE: 45
ADLS_ACUITY_SCORE: 45
DRESS: INDEPENDENT
ADLS_ACUITY_SCORE: 45
HYGIENE/GROOMING: INDEPENDENT
ORAL_HYGIENE: INDEPENDENT
ORAL_HYGIENE: INDEPENDENT
ADLS_ACUITY_SCORE: 45
LAUNDRY: UNABLE TO COMPLETE
DRESS: SCRUBS (BEHAVIORAL HEALTH);INDEPENDENT
ADLS_ACUITY_SCORE: 45
HYGIENE/GROOMING: INDEPENDENT

## 2023-06-06 NOTE — PLAN OF CARE
Problem: Suicidal Behavior  Goal: Suicidal Behavior is Absent or Managed  Outcome: Progressing  Flowsheets (Taken 6/6/2023 5976)  Mutually Determined Action Steps (Suicidal Behavior Absent/Managed):   verbalizes safety check rationale   shares suicidal thoughts   Goal Outcome Evaluation:    The patient was out in the milieu and interacted appropriately with his peers and staff. His affect was flat, but he was pleasant and cooperative. He endorsed chronic command auditory hallucinations telling him to kill himself but denied having a plan or wanting to act on it. He also reported that he thinks he's having some medication side effects as he's started experiencing ringing in both ears. His blood glucose was 249 before breakfast and 205 before lunch, and he received correction insulin at both times per his sliding scale. The patient ate dinner and lunch, engaged in self-directed activities, attended group therapy, and had a pleasant shift.

## 2023-06-06 NOTE — PROGRESS NOTES
Brief Medicine Note:    IM following glucose in the setting of diabetes. Glucose remains elevated, ranging 240s-290s recently. Currently on Lantus 18 units daily, HSSI, and Novolog 6 units with meals  - Increase lantus to 22 units daily  - Continue HSSI and Novolog 6 units with meals  - Medicine will continue to follow    Ashley Wynn PA-C  Internal Medicine [facility]  [phone]

## 2023-06-06 NOTE — CARE PLAN
06/06/23 1308   Group Therapy Session   Group Attendance attended group session   Total Time (minutes) 135   Total # Attendees 3,2   Group Type task skill;psychoeducation   Group Topic Covered coping skills/lifestyle management;leisure exploration/use of leisure time;structured socialization   Group Session Detail topic group, OT clinic   Patient Response/Contribution cooperative with task;able to recall/repeat info presented;organized   Topic group (1213-5335)  Pt attended the OT discussion group which involved reading a selected quote, discussing it's meaning, and relating it to personal life experience or situation.  Pt found deeper meaning in a few of the quotes, and was easily able to relate these to his life in the theme of support around his gender identify and another in focusing on the present moment/mindfulness.    OT clinic (8213-5164)  Per his usual, pt enjoys time to work on task activities.  Discussed wishing his group home owner would have similar supplies at home, and how helpful it would be others for as well.  Writer helped pt during group to put binder together of worksheets given to him by his therapist, and discussed journaling sheets given to him yesterday, which he found enjoyable.    Leisure Group (1703-2480)  Pt chose to work on his scratch art instead of joining writer and peer with a game.  Bright mood, friendly conversation.

## 2023-06-06 NOTE — PLAN OF CARE
Assessment/Intervention/Current Symptoms and Care Coordination:  Met with patient. Reviewed chart. Prakash would like to waive the court hearing so gave instructions on how to contact  and request that, my contact info was given to ensure paperwork is routed appropriately. Prakash had an interview with day treatment providers yesterday.      Amanda Lala at [facility] called to communicate that she would be involved in Prakash's court hearing, her number is [phone].     Prakash talked to her , Samantha Bell, who communicated that she would like for Prakash to attend the exam and preliminary hearing tomorrow instead of waiving, and Prakash was agreeable to this.      Discharge Plan or Goal:  Discharge back to [facility] in [place]      Barriers to Discharge:  Discharge pending symptom stabilization, currently awaiting commitment      Referral Status:  Interviewed with [facility] Adult Outpatient Mental Health Day Treatment on      Legal Status:  Court Hold    [facility]  File Number: [id number]  Examination & Preliminary Hearin/7 at 2:15pm  Final Hearin/12 at [facility]    Contacts:  : Samantha Bell, [phone]  : Cristy at [facility], [phone] (VAL signed)   [facility]: Deena at [facility] [employer], [phone] (VAL signed)   [facility] Director and Nurse: Domenica [phone] (VAL signed)   CADI worker: Pretty Guzman at [employer], [phone] (VAL signed)   Psychotherapist: Joshua at [employer], [phone] (VAL signed)      Upcoming Meetings and Dates/Important Information and next steps:  Psychiatrist follow-up scheduled   Examination and preliminary hearing  at 2:15pm

## 2023-06-06 NOTE — PLAN OF CARE
Problem: Sleep Disturbance  Goal: Adequate Sleep/Rest  Outcome: Progressing   Goal Outcome Evaluation:    Patient slept 7 during the night. Safety checks done every 15 minutes. No concern noted. At 0730 patient requested for PRN  Nicotine gum 2 mg was given.

## 2023-06-06 NOTE — PLAN OF CARE
"Problem: Suicidal Behavior  Goal: Suicidal Behavior is Absent or Managed  Outcome: Progressing   Goal Outcome Evaluation:     Pt visible in milieu, appears outwardly calm. Cooperative, pleasant, expresses gratitude for care, makes appropriate eye contact.  Pt reports mild anxiety, did not state a number, but indicted that it was much lower than yesterday. Reported no depression.     Pt reports hearing command voices constantly, and that he strongly wants the voices to stop. These voices tell pt to \"kill yourself\" over and over. Pt denied active SI, states the he feels safe and much safer than he did when he arrived at the hospital. Denied having any plan to kill self. No signs of SIB observed or reported.     Pt complaining of constipation. Stated that he hasn't had a BM in 4-5 days. Provider notified. Given PRN senna-docusate and Miralax. Magnesium hydroxide ordered.     Took medications without issue     Dinner blood sugar: 175, given 8 units of insulin.   HS blood sugar: 167, no insulin given  "

## 2023-06-07 ENCOUNTER — MEDICAL CORRESPONDENCE (OUTPATIENT)
Dept: HEALTH INFORMATION MANAGEMENT | Facility: CLINIC | Age: 33
End: 2023-06-07
Payer: MEDICARE

## 2023-06-07 LAB
GLUCOSE BLDC GLUCOMTR-MCNC: 162 MG/DL (ref 70–99)
GLUCOSE BLDC GLUCOMTR-MCNC: 166 MG/DL (ref 70–99)
GLUCOSE BLDC GLUCOMTR-MCNC: 194 MG/DL (ref 70–99)
GLUCOSE BLDC GLUCOMTR-MCNC: 201 MG/DL (ref 70–99)
GLUCOSE BLDC GLUCOMTR-MCNC: 220 MG/DL (ref 70–99)

## 2023-06-07 PROCEDURE — 99232 SBSQ HOSP IP/OBS MODERATE 35: CPT | Performed by: PSYCHIATRY & NEUROLOGY

## 2023-06-07 PROCEDURE — 250N000013 HC RX MED GY IP 250 OP 250 PS 637: Performed by: PSYCHIATRY & NEUROLOGY

## 2023-06-07 PROCEDURE — 250N000013 HC RX MED GY IP 250 OP 250 PS 637: Performed by: PHYSICIAN ASSISTANT

## 2023-06-07 PROCEDURE — G0177 OPPS/PHP; TRAIN & EDUC SERV: HCPCS

## 2023-06-07 PROCEDURE — 250N000012 HC RX MED GY IP 250 OP 636 PS 637

## 2023-06-07 PROCEDURE — 124N000002 HC R&B MH UMMC

## 2023-06-07 PROCEDURE — 250N000012 HC RX MED GY IP 250 OP 636 PS 637: Performed by: PHYSICIAN ASSISTANT

## 2023-06-07 RX ORDER — BISACODYL 10 MG
10 SUPPOSITORY, RECTAL RECTAL DAILY PRN
Status: DISCONTINUED | OUTPATIENT
Start: 2023-06-07 | End: 2023-06-09 | Stop reason: HOSPADM

## 2023-06-07 RX ORDER — POLYETHYLENE GLYCOL 3350 17 G/17G
17 POWDER, FOR SOLUTION ORAL DAILY
Status: DISCONTINUED | OUTPATIENT
Start: 2023-06-07 | End: 2023-06-09 | Stop reason: HOSPADM

## 2023-06-07 RX ADMIN — NICOTINE POLACRILEX 2 MG: 2 GUM, CHEWING ORAL at 14:11

## 2023-06-07 RX ADMIN — INSULIN ASPART 3 UNITS: 100 INJECTION, SOLUTION INTRAVENOUS; SUBCUTANEOUS at 13:14

## 2023-06-07 RX ADMIN — NICOTINE POLACRILEX 2 MG: 2 GUM, CHEWING ORAL at 16:36

## 2023-06-07 RX ADMIN — DOXYCYCLINE HYCLATE 100 MG: 50 CAPSULE ORAL at 20:49

## 2023-06-07 RX ADMIN — POLYETHYLENE GLYCOL 3350 17 G: 17 POWDER, FOR SOLUTION ORAL at 09:41

## 2023-06-07 RX ADMIN — Medication 5 MG: at 20:49

## 2023-06-07 RX ADMIN — NICOTINE POLACRILEX 2 MG: 2 GUM, CHEWING ORAL at 08:59

## 2023-06-07 RX ADMIN — Medication 0.25 MG: at 07:54

## 2023-06-07 RX ADMIN — QUETIAPINE FUMARATE 150 MG: 100 TABLET ORAL at 20:52

## 2023-06-07 RX ADMIN — OLANZAPINE 15 MG: 15 TABLET, FILM COATED ORAL at 21:16

## 2023-06-07 RX ADMIN — INSULIN ASPART 2 UNITS: 100 INJECTION, SOLUTION INTRAVENOUS; SUBCUTANEOUS at 17:03

## 2023-06-07 RX ADMIN — NICOTINE POLACRILEX 2 MG: 2 GUM, CHEWING ORAL at 08:00

## 2023-06-07 RX ADMIN — BUSPIRONE HYDROCHLORIDE 10 MG: 10 TABLET ORAL at 13:12

## 2023-06-07 RX ADMIN — OMEPRAZOLE 20 MG: 20 CAPSULE, DELAYED RELEASE ORAL at 07:54

## 2023-06-07 RX ADMIN — MAGNESIUM HYDROXIDE 30 ML: 400 SUSPENSION ORAL at 08:07

## 2023-06-07 RX ADMIN — GABAPENTIN 1200 MG: 600 TABLET, FILM COATED ORAL at 20:48

## 2023-06-07 RX ADMIN — PRAZOSIN HYDROCHLORIDE 1 MG: 1 CAPSULE ORAL at 20:59

## 2023-06-07 RX ADMIN — LAMOTRIGINE 25 MG: 25 TABLET ORAL at 07:53

## 2023-06-07 RX ADMIN — INSULIN ASPART 3 UNITS: 100 INJECTION, SOLUTION INTRAVENOUS; SUBCUTANEOUS at 08:38

## 2023-06-07 RX ADMIN — NICOTINE POLACRILEX 2 MG: 2 GUM, CHEWING ORAL at 18:04

## 2023-06-07 RX ADMIN — NICOTINE POLACRILEX 2 MG: 2 GUM, CHEWING ORAL at 12:22

## 2023-06-07 RX ADMIN — BUSPIRONE HYDROCHLORIDE 10 MG: 10 TABLET ORAL at 20:52

## 2023-06-07 RX ADMIN — PRAZOSIN HYDROCHLORIDE 1 MG: 1 CAPSULE ORAL at 21:30

## 2023-06-07 RX ADMIN — CLONAZEPAM 0.5 MG: 0.5 TABLET ORAL at 21:28

## 2023-06-07 RX ADMIN — BENZOYL PEROXIDE: 50 LOTION TOPICAL at 21:29

## 2023-06-07 RX ADMIN — BISACODYL 10 MG: 10 SUPPOSITORY RECTAL at 10:07

## 2023-06-07 RX ADMIN — BUSPIRONE HYDROCHLORIDE 10 MG: 10 TABLET ORAL at 07:54

## 2023-06-07 RX ADMIN — INSULIN ASPART 6 UNITS: 100 INJECTION, SOLUTION INTRAVENOUS; SUBCUTANEOUS at 13:05

## 2023-06-07 RX ADMIN — GABAPENTIN 1200 MG: 600 TABLET, FILM COATED ORAL at 07:55

## 2023-06-07 RX ADMIN — GABAPENTIN 1200 MG: 600 TABLET, FILM COATED ORAL at 13:12

## 2023-06-07 RX ADMIN — ROSUVASTATIN CALCIUM 10 MG: 10 TABLET, FILM COATED ORAL at 07:54

## 2023-06-07 RX ADMIN — TRETINOIN: 0.5 CREAM TOPICAL at 21:23

## 2023-06-07 RX ADMIN — DEXTROAMPHETAMINE SULFATE, DEXTROAMPHETAMINE SACCHARATE, AMPHETAMINE SULFATE AND AMPHETAMINE ASPARTATE 20 MG: 5; 5; 5; 5 CAPSULE, EXTENDED RELEASE ORAL at 07:53

## 2023-06-07 RX ADMIN — CLONAZEPAM 0.5 MG: 0.5 TABLET ORAL at 20:54

## 2023-06-07 RX ADMIN — AMLODIPINE BESYLATE 10 MG: 10 TABLET ORAL at 08:14

## 2023-06-07 RX ADMIN — NICOTINE POLACRILEX 2 MG: 2 GUM, CHEWING ORAL at 11:22

## 2023-06-07 RX ADMIN — DOXYCYCLINE HYCLATE 100 MG: 50 CAPSULE ORAL at 07:53

## 2023-06-07 RX ADMIN — NICOTINE 1 PATCH: 21 PATCH, EXTENDED RELEASE TRANSDERMAL at 08:00

## 2023-06-07 RX ADMIN — NICOTINE POLACRILEX 2 MG: 2 GUM, CHEWING ORAL at 21:01

## 2023-06-07 ASSESSMENT — ACTIVITIES OF DAILY LIVING (ADL)
ADLS_ACUITY_SCORE: 45
ORAL_HYGIENE: INDEPENDENT
ADLS_ACUITY_SCORE: 45
ADLS_ACUITY_SCORE: 45
HYGIENE/GROOMING: INDEPENDENT
ADLS_ACUITY_SCORE: 45
ADLS_ACUITY_SCORE: 45
DRESS: SCRUBS (BEHAVIORAL HEALTH)
ORAL_HYGIENE: INDEPENDENT
ADLS_ACUITY_SCORE: 45
ADLS_ACUITY_SCORE: 45
LAUNDRY: UNABLE TO COMPLETE
ADLS_ACUITY_SCORE: 45
ADLS_ACUITY_SCORE: 45
HYGIENE/GROOMING: INDEPENDENT
ADLS_ACUITY_SCORE: 45

## 2023-06-07 NOTE — PLAN OF CARE
Pt asleep at start of shift.     Breathing quiet and unlabored when sleeping.     Pt had no c/o pain or discomfort during the HS.     Appears to have slept 6.25 hours.     BG @ 0300 was 162.     Goal Outcome Evaluation:  Problem: Sleep Disturbance  Goal: Adequate Sleep/Rest  Outcome: Progressing

## 2023-06-07 NOTE — DISCHARGE INSTRUCTIONS
Behavioral Discharge Planning and Instructions    Summary: You were admitted on 5/24/2023  due to suicidal ideation.  You were treated by Dr. Mendoza and discharged on 6/8/23 from [facility] 12 to [facility].    Main Diagnosis: Suicidal ideation   Schizoaffective disorder, bipolar type, chronic, severe, acute episode appears mixed with psychosis symptoms active    Health Care Follow-up:   Follow Up Primary  Date/time: Tuesday June 13th, 2023 at 2:00 PM  In person  Provider: Anatoly Womack  Address: [facility], [address] [place] [id number]  Phone: [phone]    Follow Up Psychiatry  Date/time: Wednesday, June 14th at 2:00pm, In person  Provider: Regine Last  Address: [place] [address]  Phone: [phone]        Attend all scheduled appointments with your outpatient providers. Call at least 24 hours in advance if you need to reschedule an appointment to ensure continued access to your outpatient providers.     Major Treatments, Procedures and Findings:  You were provided with: a psychiatric assessment, assessed for medical stability, medication evaluation and/or management, group therapy, milieu management, and medical interventions    Symptoms to Report: mood getting worse or thoughts of suicide    Early warning signs can include: increased depression or anxiety increased thoughts or behaviors of suicide or self-harm     Safety and Wellness:  {Adult [facility] Safety and Wellness:747432}    Resources:   Crisis Intervention: 482.226.9876 or 743-283-8047 (TTY: 657.158.3827).  Call anytime for help.  National [place] on Mental Illness (www.mn.jhonatan.org): 917.285.2515 or 554-899-0117.  [place] Association for Children's Mental Health (www.macmh.org): 654.625.6073.  Suicide Awareness Voices of Education (SAVE) (www.save.org): 897-845-FWHE (4081)  National Suicide Prevention Line (www.mentalhealthmn.org): 531-016-EDQR (3064)  Mental Health  "Consumer/Survivor Network of [place] (www.mhcsn.net): 965-473-6044 or 837-034-1931  Mental Health Association of [place] (www.mentalhealth.org): 590.476.1923 or 399-348-1700  Self- Management and Recovery Training., SMART-- Toll free: 807.121.2723  www.[employer]  [facility] Crisis (COPE) Response - Adult 292 354-5849  Text 4 Life: txt \"LIFE\" to 89098 for immediate support and crisis intervention  Crisis text line: Text \"[place]\" to 943724. Free, confidential, 24/7.  Crisis Intervention: 283.981.2120 or 071-573-5473. Call anytime for help.   [facility] Crisis Team - Child: [phone]    General Medication Instructions:   See your medication sheet(s) for instructions.   Take all medicines as directed.  Make no changes unless your doctor suggests them.   Go to all your doctor visits.  Be sure to have all your required lab tests. This way, your medicines can be refilled on time.  Do not use any drugs not prescribed by your doctor.  Avoid alcohol.    Advance Directives:   Scanned document on file with [employer]? No scanned doc  Is document scanned? Pt states no documents  Honoring Choices Your Rights Handout: Informed and given  Was more information offered? Materials given    The Treatment team has appreciated the opportunity to work with you. If you have any questions or concerns about your recent admission, you can contact the unit which can receive your call 24 hours a day, 7 days a week. They will be able to get in touch with a Provider if needed. The unit number is [phone] .      "

## 2023-06-07 NOTE — PLAN OF CARE
"Goal Outcome Evaluation:    Plan of Care Reviewed With: patient        Prakash was up ad joshua, spent most of the shift in the milieu, Pt participated in group activities. Prakash was med adherent, cooperative with diabetic cares.     Pt reported continued constipation. Pt was given PRN milk of mag per progression with no results. Internal med notified, ordered PRN suppository which was given, no result. Primary ordered daily  miralax scheduled, which Pt started this in addition to the aforementioned medication. Internal med was briefed on Pt's still not producing BM, spoke with Pt and an enema was ordered. Just prior to the Enema being delivered, Pt had a small formed BM, then 45 minutes later had a medium sized formed BM. I.M. notified, enema was discontinued.     Prakash said he continues to experience command auditory hallucinations (suicidal) though they are much less intrusive \"They are much better\" Pt reports feeling somewhat anxious which he primarily attributed to having Court meeting today at 14:15 pm.    Prakash was pleasant on approach, cooperative.            "

## 2023-06-07 NOTE — PROGRESS NOTES
06/07/23 1002   Individualization/Patient Specific Goals   Patient Personal Strengths community support;expressive of emotions;expressive of needs;family/social support;humor;medication/treatment adherence;interests/hobbies;motivated for treatment;no history of violence;positive attitude;resilient;resourceful;stable living environment;tolerant   Patient Vulnerabilities adverse childhood experience(s);history of unsuccessful treatment;substance abuse/addiction;traumatic event   Interprofessional Rounds   Summary Prakash is in the court process and will safely discharge upon completion of court   Participants CTC;nursing;psychiatrist   Behavioral Team Discussion   Participants Gaye Baca, nurse manager, Juliana ESPINO, Tisha RN, Troy BORDEN, Donita BORDEN   Anticipated length of stay 3 days   Continued Stay Criteria/Rationale Court process   Anticipated Discharge Disposition group home   PRECAUTIONS AND SAFETY    Behavioral Orders   Procedures     Code 1 - Restrict to Unit     Routine Programming     As clinically indicated     Status 15     Every 15 minutes.     Suicide precautions     Patients on Suicide Precautions should have a Combination Diet ordered that includes a Diet selection(s) AND a Behavioral Tray selection for Safe Tray - with utensils, or Safe Tray - NO utensils         Safety  Safety WDL: WDL  Patient Location: [facility]  Observed Behavior: calm  Safety Measures: environmental rounds completed, suicide check-in completed, suicide assessment completed  Diversional Activity: music, television  Suicidality: Status 15, Behavioral scrubs (pajamas), Minimal furniture in room, Minimal personal belongings in room

## 2023-06-07 NOTE — PROGRESS NOTES
Brief Medicine Note:     IM following glucose in the setting of diabetes. Glucose improved over the past 24 hours with changed made 6/6. Currently on Lantus 22 units daily, HSSI, and Novolog 6 units with meals  - Continue current regimen  - Contact medicine if glucose spikes >350, <70, or if consistently >250  - Medicine will sign off. Please contact medicine with future questions or concerns      Ashley Wynn PA-C  Internal Medicine [facility]  [phone]

## 2023-06-07 NOTE — PLAN OF CARE
Assessment/Intervention/Current Symptoms and Care Coordination:  Met with patient. Reviewed chart. Met with team to review patient care. Checked in with Prakash prior to his court hearing at which time he reported feeling anxious, writer encouraged the use of coping skills which Prakash agreed to. Received follow up from Amanda with [facility] Attorney's Office and writer communicated support for a stay of commitment.      Discharge Plan or Goal:  Discharge back to [facility] in [place]      Barriers to Discharge:  Discharge pending symptom stabilization, currently awaiting commitment      Referral Status:  Interviewed with [facility] on      Legal Status:  Court Hold    [facility]  File Number: [id number]  Final Hearin/12 at D    Contacts:  : Samantha Bell, [phone]  : Cristy at [facility], [phone] (VAL signed)   [facility]: Deena at [facility], [phone] (VAL signed)   [facility] Director and Nurse: Domenica [phone] (VAL signed)   CADI worker: Pretty Guzman at [employer], [phone] (VAL signed)   Psychotherapist: Joshua at [facility], [phone] (VAL signed)      Upcoming Meetings and Dates/Important Information and next steps:  Psychiatrist follow-up scheduled   Final hearing scheduled for , stay of commitment proposed to [facility] Attorney's Office

## 2023-06-07 NOTE — PROGRESS NOTES
"[facility] [place]   Psychiatric Progress Note  Hospital Day: 11        Interim History:   The patient's care was discussed with the treatment team during the daily team meeting and/or staff's chart notes were reviewed.  Staff report patient reports AH though overall improving and no longer command in nature. Less frequent SI. Sleeping well per chart review. No agitation or aggression. NO SIB. Reporting constipation. Last BM was 4-5 days ago.     Upon interview, Prakash says that he feels stable for discharge. He would like to be discharge this week, if possible. He said that suicidal thoughts have gone from constant, active SI on daily basis at time of admission to passive, fleeting SI twice per week. He feels that they are manageable and agreed to present to ED/call 911 if they worsened upon discharge. He is really looking forward to seeing his dog and his family. He said that he continues to hear voices, but \"there more just paranoid thoughts and they are not telling me to harm myself.\"     Suicidal ideation: Chronic, passive SI though no current intent or plan. Denying SIB urges again today.     Homicidal ideation: denies current or recent homicidal ideation or behaviors.    Psychotic symptoms: Patient denies command AH, VH, delusions.     Medication side effects reported: No reported side effects    Acute medical concerns: none, longstanding lower back pain    Other issues reported by patient: Patient had no further questions or concerns.           Medications:       amLODIPine  10 mg Oral Daily     amphetamine-dextroamphetamine  20 mg Oral Daily     benzoyl peroxide   Topical Daily     busPIRone  10 mg Oral TID     clonazePAM  0.25 mg Oral Daily     clonazePAM  0.5 mg Oral At Bedtime     doxycycline hyclate  100 mg Oral BID     gabapentin  1,200 mg Oral TID     insulin aspart  6 Units Subcutaneous Daily with supper     insulin aspart  6 Units Subcutaneous Daily with lunch     " insulin aspart  6 Units Subcutaneous Daily with breakfast     insulin aspart  1-10 Units Subcutaneous TID AC     insulin aspart  1-7 Units Subcutaneous At Bedtime     insulin glargine  22 Units Subcutaneous QAM AC     lamoTRIgine  25 mg Oral Daily     melatonin  5 mg Oral At Bedtime     nicotine  1 patch Transdermal Daily    And     nicotine   Transdermal Q8H RADHA     OLANZapine  15 mg Oral At Bedtime     omeprazole  20 mg Oral Daily     prazosin  1 mg Oral At Bedtime     QUEtiapine  150 mg Oral At Bedtime     rosuvastatin  10 mg Oral Daily     Semaglutide (1 MG/DOSE)  1 mg Subcutaneous Q7 Days     testosterone cypionate  20 mg Subcutaneous Weekly     tretinoin   Topical At Bedtime          Allergies:     Allergies   Allergen Reactions     Haldol [Haloperidol] Other (See Comments)     Makes patient very angry and anxious     Adhesive Tape Hives     Percocet [Oxycodone-Acetaminophen] Nausea and Vomiting     Prednisone Other (See Comments) and Hives     Suicidal ideation     Risperidone Other (See Comments)     Tramadol Hcl Nausea and Vomiting     Droperidol Anxiety     Seroquel [Quetiapine] Palpitations     Spent 2 weeks in the hospital due to having seroquel, caused palpitations and QT prolongation          Labs:     Recent Results (from the past 24 hour(s))   Glucose by meter    Collection Time: 06/06/23 11:45 AM   Result Value Ref Range    GLUCOSE BY METER POCT 205 (H) 70 - 99 mg/dL   Glucose by meter    Collection Time: 06/06/23  5:09 PM   Result Value Ref Range    GLUCOSE BY METER POCT 175 (H) 70 - 99 mg/dL   Glucose by meter    Collection Time: 06/06/23  9:52 PM   Result Value Ref Range    GLUCOSE BY METER POCT 167 (H) 70 - 99 mg/dL   Glucose by meter    Collection Time: 06/07/23  3:01 AM   Result Value Ref Range    GLUCOSE BY METER POCT 162 (H) 70 - 99 mg/dL   Glucose by meter    Collection Time: 06/07/23  7:46 AM   Result Value Ref Range    GLUCOSE BY METER POCT 194 (H) 70 - 99 mg/dL          Psychiatric  Examination:     /83 (BP Location: Right arm, Patient Position: Sitting, Cuff Size: Adult Large)   Pulse 104   Temp 97.9  F (36.6  C) (Temporal)   Resp 16   Wt 112.5 kg (248 lb)   LMP  (LMP Unknown)   SpO2 95%   BMI 40.95 kg/m    Weight is 248 lbs 0 oz  Body mass index is 40.95 kg/m .    Weight over time:  Vitals:    05/27/23 1225 05/30/23 0748 06/04/23 0730   Weight: 107.9 kg (237 lb 12.8 oz) 108.6 kg (239 lb 6.4 oz) 112.5 kg (248 lb)       Orthostatic Vitals       Most Recent      Standing Orthostatic /73 05/31 0824    Standing Orthostatic Pulse (bpm) 117 05/31 0824            Cardiometabolic risk assessment. 06/01/23      Reviewed patient profile for cardiometabolic risk factors    Date taken /Value  REFERENCE RANGE   Abdominal Obesity  (Waist Circumference)   See nursing flowsheet Women ?35 in (88 cm)   Men ?40 in (102 cm)      Triglycerides  Triglycerides   Date Value Ref Range Status   05/28/2023 240 (H) <150 mg/dL Final   04/28/2021 317 (H) <150 mg/dL Final       ?150 mg/dL (1.7 mmol/L) or current treatment for elevated triglycerides   HDL cholesterol  HDL Cholesterol   Date Value Ref Range Status   04/28/2021 37 (L) >40 mg/dL Final     Direct Measure HDL   Date Value Ref Range Status   05/28/2023 35 (L) >=40 mg/dL Final   ]   Women <50 mg/dL (1.3 mmol/L) in women or current treatment for low HDL cholesterol  Men <40 mg/dL (1 mmol/L) in men or current treatment for low HDL cholesterol     Fasting plasma glucose (FPG) Lab Results   Component Value Date     06/01/2023     05/05/2023     05/05/2023     05/19/2021      FPG ?100 mg/dL (5.6 mmol/L) or treatment for elevated blood glucose   Blood pressure  BP Readings from Last 3 Encounters:   06/07/23 129/83   05/22/23 138/88   04/19/23 122/78    Blood pressure ?130/85 mmHg or treatment for elevated blood pressure   Family History  See family history     Mental Status Exam:  Appearance: awake, alert and adequately  "groomed  Attitude:  cooperative  Eye Contact:  good  Mood:  \"better\"  Affect:  mood congruent, blunted  Speech:  clear, coherent  Language: fluent and intact in English  Psychomotor, Gait, Musculoskeletal:  no evidence of tardive dyskinesia, dystonia, or tics  Throught Process:  logical, linear and goal oriented  Associations:  no loose associations  Thought Content:  passive suicidal ideation present and auditory hallucinations present. Likely present intermittently at baseline.   Insight:  fair  Judgement:  fair  Oriented to:  time, person, and place  Attention Span and Concentration:  intact  Recent and Remote Memory:  intact  Fund of Knowledge:  appropriate    Clinical Global Impressions  First:  Considering your total clinical experience with this particular patient population, how severe are the patient's symptoms at this time?: 7 (05/27/23 1309)  Compared to the patient's condition at the START of treatment, this patient's condition is: 4 (05/27/23 1309)  Most recent:  Considering your total clinical experience with this particular patient population, how severe are the patient's symptoms at this time?: 7 (05/27/23 1309)  Compared to the patient's condition at the START of treatment, this patient's condition is: 4 (05/27/23 1309)           Precautions:     Behavioral Orders   Procedures     Code 1 - Restrict to Unit     Routine Programming     As clinically indicated     Status 15     Every 15 minutes.     Suicide precautions     Patients on Suicide Precautions should have a Combination Diet ordered that includes a Diet selection(s) AND a Behavioral Tray selection for Safe Tray - with utensils, or Safe Tray - NO utensils            Diagnoses:     Schizoaffective disorder, bipolar type (H)  Borderline personality disorder  Moderate episode of recurrent major depressive disorder (H)  Suicidal ideation  Lab test negative for COVID-19 virus  Prolonged QTc         Assessment & Plan:     Assessment and hospital " summary:  This patient is a 32 year old transgender male with history of mood disorder, psychosis and substance use who presented to ED with SI in context of increased medical concerns with abnormal LFTs and uncontrolled BG, possible medication induced haven/hypomania with medication changes and elevated anxiety. Medically cleared in ED, UDS positive for amphetamines (on adderall) and cannabinoids; was evaluated by psychiatry consult service. PTA olanzapine and clonazepam increased, prazosin started for PTSD and lamotrigine started for mood. Admitted to [facility] as voluntary patient and overflow patient for high acuity unit. PTA medications continued along with changes made to medications in ED with exception of Adderall discontinued for further evaluation by primary team. Pt also requests to take testosterone IM on 5/27 as has missed dose for 2 weeks due to increased depression/amotivation. Pt unsure of goals for hospitalization other to have SI, anxiety improved, likely will return to . Inpatient psychiatric hospitalization is warranted at this time for safety, stabilization, and possible adjustment in medications.    5/27: Testerone IM restarted    5/28: will resume PTA Adderall XR 20mg daily for ADHD, pt reports this has been well tolerated for >20 years with no change in psychosis, anxiety or mood state noted with changes in this medication, monitoring closely. Continue PTA clonazepam at increased dose of 1mg BID for anxiety.      5/30: Start BuSpar 10 mg 3 times daily for anxiety and Start Seroquel 50 mg 4 times daily as needed for anxiety    5/31:He says that Seroquel works better for him, QT QTc was normal on EKG, will order Seroquel (100 mg) at night and decrease Zyprexa dose to 20 mg QHS.  He gained weight on Zyprexa and blood glucoses more difficult to control on Zyprexa.  He says that uncontrolled blood glucose affects his mood 2. We discussed decreasing Klonopin to 0.5 mg twice daily for few days and then  discontinuing it.  Controlled substance is not the best option for him considering his history of chemical dependency. We also discussed addictive potential of Adderall, versus Strattera.     6/1-6/2: Continue current medications without changes.     6/3: Internal Medicine following peripherally for blood sugar management. Blood sugars remain elevated in the 200-300 range.  Currently on high intensity sliding scale, prandial insulin (3 units with each meal) and Lantus 18 units every morning.   -increase prandial insulin coverage to 6 units   -Continue current Lantus dose     6/5: EKG with QTc of 484    Today's Changes:  Add Miralax 17 g daily and RN discussed with IM to formulate plan moving forward.  Reviewed IM notes. Appreciate assistance.   Increase Lamictal on 6/9  Day treatment referral placed.     Target psychiatric symptoms and interventions:  Continue Zyprexa 15 mg nightly for mood stabilization and psychosis  Continue Seroquel 150 mg nightly for augmentation of Zyprexa, sleep, anxiety monitor  Continue melatonin 5 mg at bedtime  Seroquel 50 mg 4 times daily as needed for anxiety  BuSpar 10 mg 3 times daily for anxiety  Zyprexa 10 mg daily as needed p.o. or IM for psychosis, agitation and aggression  Prazosin 1 mg nightly for nightmares of PTSD  Depo testosterone injection 20 mg subcu weekly, first dose on 5/27/2023  If given IM, it  has to be given in glluleus   Lamictal 25 mg daily for mood stabilization, will titrate (initiated on 5/26)  Klonopin 0.25 mg daily and 0.5 mg QHS  Neurontin 1200 mg 3 times daily for anxiety and mood stabilization  Adderall XR 20 mg daily for ADHD    Risks, benefits, and alternatives discussed at length with patient.     Acute Medical Problems and Treatments:  Acute medical concerns:  Medical concerns:   1) Type 2 DM on insulin, poorly controlled, last A1c 9.1 on 5/5, had insulin dose increased on 5/22  -continue Lantus 15 units every morning before breakfast  -also per chart  was prescribed semaglutide weekly, last dose 5/16, can use home supply if GH brings in  -BG checks ordered along with hypoglycemia medications   -IM consult placedfor further management, appreciate assistance, per note 6/6:      Brief Medicine Note:     IM following glucose in the setting of diabetes. Glucose improved over the past 24 hours with changed made 6/6. Currently on Lantus 22 units daily, HSSI, and Novolog 6 units with meals  - Continue current regimen  - Contact medicine if glucose spikes >350, <70, or if consistently >250  - Medicine will sign off. Please contact medicine with future questions or concerns      Ashley Wynn PA-C     2) Elevated LFTs, hx of fatty liver per chart   -repeat CMP shows LFTs downtrending   -was evaluated by PCP on 5/22, recommended avoiding acetaminophen and alcohol with GI referral and Abd US ordered   -will defer additional work up to IM      3) Hypertriglyceridemia history   -ordered lipid panel as above, per chart has declined to take fish oil when recommended by PCP  -lipid panel with (H) and HDL 35(L) otherwise WNL   -recommend follow up with PCP as outpatient      4) Leukocytosis on admit WBC 11.4(H)  - repeat WBC this AM normalized at 8.7  -no other evidence of infection   -continue to monitor     5) HTN  -monitor BPs  -continued PTA amlodipine   -follow up with PCP     6) Acne  -continued PTA doxycycline, benzoyl peroxide and tretinoin  -follow up with PCP/derm on discharge      7) Sinus tachycardia and borderline prolonged QTc of 476ms per EKG on 5/27  -continue to monitor vitals, electrolytes were WNL on admission   -recommend rechecking EKG periodically and avoiding QTc prolonging agents as able   - repeat EKG on 6/5 with QTc of 484. Will continue to monitor and will avoid QTc prolonging agents as able.     Pertinent labs/imaging:  TSH WNL and lipid panel with (H) and HDL 35(L) otherwise WNL; repeat WBC WNL; repeat CMP showed LFTs down trending  with AST 76(H) and ALT 82(H), Ca now WNL and glucose 154(H); EKG with Sinus tachycardia with  and QTc of 476ms     Behavioral/Psychological/Social:  - Encourage unit programming    Safety:  - Continue precautions as noted above  - Status 15 minute checks  - Suicide precautions    Legal Status: Court hold. Filled for MI commitment through [facility] on 6/1. Preliminary hearing today.     Disposition Plan   Reason for ongoing admission: Pt is improving with resolution of active SI and SIB urges. Awaiting outcome of commitment process.   Discharge location: group home with day treatment in place  Discharge Medications: not ordered  Follow-up Appointments: not scheduled    Entered by: Tessa Mendoza MD on 6/7/2023 at 8:56 AM

## 2023-06-07 NOTE — CARE PLAN
Occupational Therapy     06/07/23 1400   Group Therapy Session   Group Attendance attended group session   Total Time (minutes) 135   Total # Attendees 2,2,1   Group Type task skill;psychoeducation   Group Topic Covered cognitive activities;coping skills/lifestyle management;leisure exploration/use of leisure time;problem-solving;structured socialization   Group Session Detail OT: Education on healthy activity engagement with creative hands-on endeavor or cognitive activity (OT clinic) to increase concentration, focus, attention to task/detail, decision making, problem solving, frustration tolerance, task follow through, coping with stress, healthy leisure engagement, creative expression, and social engagement   Patient Response/Contribution cooperative with task;listened actively;offered helpful suggestions to peers;organized;other (see comments)  (pleasant; cooperative; engaged; insightful)   Patient Participation Detail 10:15-11:45 Pt elected to engage in familiar and preferred creative hands on endeavor and able to recall the steps needed for the activity. Pt sat with therapist and engaged pleasant reciprocal social interactions and was able to sustain attention to both the project and the conversation. Pt reported they are both nervous and excited about the court hearing. Pt reported they don't want to get their hopes up that the court will allow them to discharge tomorrow, but are excited about the possibility. Pt worked in a neat and tidy manner to complete project and appeared proud of what they had accomplished.     1:15-2:00 Pt elected to engage in familiar and preferred creative hands on endeavor and able to recall the steps needed for the activity. Pt sat with therapist and engaged pleasant reciprocal social interactions and was able to sustain attention to both the project and the conversation. Pt reported they are feeling confident in their 's advice regarding their hearing today and feel they  are ready to complete the hearing and receive a decision.

## 2023-06-08 LAB
GLUCOSE BLDC GLUCOMTR-MCNC: 182 MG/DL (ref 70–99)
GLUCOSE BLDC GLUCOMTR-MCNC: 199 MG/DL (ref 70–99)
GLUCOSE BLDC GLUCOMTR-MCNC: 228 MG/DL (ref 70–99)
GLUCOSE BLDC GLUCOMTR-MCNC: 259 MG/DL (ref 70–99)
GLUCOSE BLDC GLUCOMTR-MCNC: 293 MG/DL (ref 70–99)

## 2023-06-08 PROCEDURE — 250N000013 HC RX MED GY IP 250 OP 250 PS 637: Performed by: PSYCHIATRY & NEUROLOGY

## 2023-06-08 PROCEDURE — G0177 OPPS/PHP; TRAIN & EDUC SERV: HCPCS

## 2023-06-08 PROCEDURE — 124N000002 HC R&B MH UMMC

## 2023-06-08 PROCEDURE — H2032 ACTIVITY THERAPY, PER 15 MIN: HCPCS

## 2023-06-08 PROCEDURE — 99239 HOSP IP/OBS DSCHRG MGMT >30: CPT | Performed by: PSYCHIATRY & NEUROLOGY

## 2023-06-08 RX ORDER — BISACODYL 10 MG
10 SUPPOSITORY, RECTAL RECTAL DAILY PRN
Qty: 30 SUPPOSITORY | Refills: 0 | Status: SHIPPED | OUTPATIENT
Start: 2023-06-08 | End: 2023-08-10

## 2023-06-08 RX ORDER — AMOXICILLIN 250 MG
1 CAPSULE ORAL 2 TIMES DAILY PRN
Qty: 60 TABLET | Refills: 0 | Status: SHIPPED | OUTPATIENT
Start: 2023-06-08 | End: 2023-08-10

## 2023-06-08 RX ORDER — ONDANSETRON 4 MG/1
4 TABLET, ORALLY DISINTEGRATING ORAL DAILY PRN
Qty: 30 TABLET | Refills: 0 | Status: ON HOLD | OUTPATIENT
Start: 2023-06-08 | End: 2023-08-28

## 2023-06-08 RX ORDER — OLANZAPINE 15 MG/1
15 TABLET ORAL AT BEDTIME
Qty: 30 TABLET | Refills: 0 | Status: SHIPPED | OUTPATIENT
Start: 2023-06-08 | End: 2023-06-22

## 2023-06-08 RX ORDER — CLONAZEPAM 0.5 MG/1
0.5 TABLET ORAL AT BEDTIME
Qty: 30 TABLET | Refills: 0 | Status: SHIPPED | OUTPATIENT
Start: 2023-06-08 | End: 2023-06-22

## 2023-06-08 RX ORDER — TRETINOIN 0.5 MG/G
CREAM TOPICAL AT BEDTIME
Qty: 45 G | Refills: 0 | Status: ON HOLD | OUTPATIENT
Start: 2023-06-08 | End: 2023-08-28

## 2023-06-08 RX ORDER — LAMOTRIGINE 25 MG/1
50 TABLET ORAL DAILY
Qty: 60 TABLET | Refills: 0 | Status: SHIPPED | OUTPATIENT
Start: 2023-06-09 | End: 2023-06-22

## 2023-06-08 RX ORDER — QUETIAPINE FUMARATE 150 MG/1
150 TABLET, FILM COATED ORAL AT BEDTIME
Qty: 30 TABLET | Refills: 0 | Status: SHIPPED | OUTPATIENT
Start: 2023-06-08 | End: 2023-06-22 | Stop reason: DRUGHIGH

## 2023-06-08 RX ORDER — DEXTROAMPHETAMINE SACCHARATE, AMPHETAMINE ASPARTATE MONOHYDRATE, DEXTROAMPHETAMINE SULFATE AND AMPHETAMINE SULFATE 5; 5; 5; 5 MG/1; MG/1; MG/1; MG/1
20 CAPSULE, EXTENDED RELEASE ORAL DAILY
Qty: 30 CAPSULE | Refills: 0 | Status: SHIPPED | OUTPATIENT
Start: 2023-06-08 | End: 2023-06-22

## 2023-06-08 RX ORDER — CLONIDINE HYDROCHLORIDE 0.1 MG/1
0.1 TABLET ORAL DAILY PRN
Qty: 1 TABLET | Refills: 0 | Status: SHIPPED | OUTPATIENT
Start: 2023-06-08 | End: 2023-06-14

## 2023-06-08 RX ORDER — PRAZOSIN HYDROCHLORIDE 1 MG/1
1 CAPSULE ORAL AT BEDTIME
Qty: 30 CAPSULE | Refills: 0 | Status: SHIPPED | OUTPATIENT
Start: 2023-06-08 | End: 2023-06-22

## 2023-06-08 RX ORDER — AMLODIPINE BESYLATE 10 MG/1
10 TABLET ORAL DAILY
Qty: 30 TABLET | Refills: 0 | Status: ON HOLD | OUTPATIENT
Start: 2023-06-08 | End: 2023-08-28

## 2023-06-08 RX ORDER — LAMOTRIGINE 25 MG/1
50 TABLET ORAL DAILY
Status: DISCONTINUED | OUTPATIENT
Start: 2023-06-09 | End: 2023-06-09 | Stop reason: HOSPADM

## 2023-06-08 RX ORDER — BUSPIRONE HYDROCHLORIDE 10 MG/1
10 TABLET ORAL 3 TIMES DAILY
Qty: 90 TABLET | Refills: 0 | Status: SHIPPED | OUTPATIENT
Start: 2023-06-08 | End: 2023-06-22

## 2023-06-08 RX ORDER — SALIVA STIMULANT COMB. NO.3
2 SPRAY, NON-AEROSOL (ML) MUCOUS MEMBRANE 4 TIMES DAILY PRN
Qty: 44.3 ML | Refills: 0 | Status: SHIPPED | OUTPATIENT
Start: 2023-06-08 | End: 2023-08-10

## 2023-06-08 RX ORDER — IBUPROFEN 600 MG/1
600 TABLET, FILM COATED ORAL EVERY 6 HOURS PRN
Start: 2023-06-08 | End: 2023-08-10

## 2023-06-08 RX ORDER — POLYETHYLENE GLYCOL 3350 17 G/17G
17 POWDER, FOR SOLUTION ORAL DAILY
Qty: 510 G | Refills: 0 | Status: SHIPPED | OUTPATIENT
Start: 2023-06-08 | End: 2023-08-10

## 2023-06-08 RX ORDER — DOXYCYCLINE 100 MG/1
100 CAPSULE ORAL 2 TIMES DAILY
Qty: 60 CAPSULE | Refills: 0 | Status: ON HOLD | OUTPATIENT
Start: 2023-06-08 | End: 2023-08-28

## 2023-06-08 RX ORDER — GABAPENTIN 600 MG/1
1200 TABLET ORAL 3 TIMES DAILY
Qty: 180 TABLET | Refills: 0 | Status: ON HOLD | OUTPATIENT
Start: 2023-06-08 | End: 2023-08-28

## 2023-06-08 RX ORDER — ROSUVASTATIN CALCIUM 10 MG/1
10 TABLET, COATED ORAL DAILY
Qty: 30 TABLET | Refills: 0 | Status: SHIPPED | OUTPATIENT
Start: 2023-06-08 | End: 2023-08-10

## 2023-06-08 RX ADMIN — NICOTINE 1 PATCH: 21 PATCH, EXTENDED RELEASE TRANSDERMAL at 08:25

## 2023-06-08 RX ADMIN — LAMOTRIGINE 25 MG: 25 TABLET ORAL at 08:07

## 2023-06-08 RX ADMIN — DOXYCYCLINE HYCLATE 100 MG: 50 CAPSULE ORAL at 20:06

## 2023-06-08 RX ADMIN — GABAPENTIN 1200 MG: 600 TABLET, FILM COATED ORAL at 13:17

## 2023-06-08 RX ADMIN — BENZOYL PEROXIDE: 50 LOTION TOPICAL at 21:09

## 2023-06-08 RX ADMIN — OLANZAPINE 5 MG: 5 TABLET, FILM COATED ORAL at 15:17

## 2023-06-08 RX ADMIN — POLYETHYLENE GLYCOL 3350 17 G: 17 POWDER, FOR SOLUTION ORAL at 08:06

## 2023-06-08 RX ADMIN — DEXTROAMPHETAMINE SULFATE, DEXTROAMPHETAMINE SACCHARATE, AMPHETAMINE SULFATE AND AMPHETAMINE ASPARTATE 20 MG: 5; 5; 5; 5 CAPSULE, EXTENDED RELEASE ORAL at 08:06

## 2023-06-08 RX ADMIN — TRETINOIN: 0.5 CREAM TOPICAL at 21:08

## 2023-06-08 RX ADMIN — NICOTINE POLACRILEX 2 MG: 2 GUM, CHEWING ORAL at 16:34

## 2023-06-08 RX ADMIN — INSULIN ASPART 7 UNITS: 100 INJECTION, SOLUTION INTRAVENOUS; SUBCUTANEOUS at 12:50

## 2023-06-08 RX ADMIN — Medication 2 SPRAY: at 11:20

## 2023-06-08 RX ADMIN — INSULIN ASPART 6 UNITS: 100 INJECTION, SOLUTION INTRAVENOUS; SUBCUTANEOUS at 12:49

## 2023-06-08 RX ADMIN — CLONAZEPAM 0.5 MG: 0.5 TABLET ORAL at 20:15

## 2023-06-08 RX ADMIN — NICOTINE POLACRILEX 2 MG: 2 GUM, CHEWING ORAL at 07:40

## 2023-06-08 RX ADMIN — GABAPENTIN 1200 MG: 600 TABLET, FILM COATED ORAL at 08:07

## 2023-06-08 RX ADMIN — PRAZOSIN HYDROCHLORIDE 1 MG: 1 CAPSULE ORAL at 20:16

## 2023-06-08 RX ADMIN — NICOTINE POLACRILEX 2 MG: 2 GUM, CHEWING ORAL at 10:23

## 2023-06-08 RX ADMIN — HYDROXYZINE HYDROCHLORIDE 25 MG: 25 TABLET, FILM COATED ORAL at 14:08

## 2023-06-08 RX ADMIN — BUSPIRONE HYDROCHLORIDE 10 MG: 10 TABLET ORAL at 20:06

## 2023-06-08 RX ADMIN — GABAPENTIN 1200 MG: 600 TABLET, FILM COATED ORAL at 20:06

## 2023-06-08 RX ADMIN — BUSPIRONE HYDROCHLORIDE 10 MG: 10 TABLET ORAL at 08:06

## 2023-06-08 RX ADMIN — BUSPIRONE HYDROCHLORIDE 10 MG: 10 TABLET ORAL at 13:17

## 2023-06-08 RX ADMIN — Medication 0.25 MG: at 08:06

## 2023-06-08 RX ADMIN — NICOTINE POLACRILEX 2 MG: 2 GUM, CHEWING ORAL at 15:00

## 2023-06-08 RX ADMIN — OMEPRAZOLE 20 MG: 20 CAPSULE, DELAYED RELEASE ORAL at 08:06

## 2023-06-08 RX ADMIN — OLANZAPINE 15 MG: 15 TABLET, FILM COATED ORAL at 20:15

## 2023-06-08 RX ADMIN — Medication 5 MG: at 20:06

## 2023-06-08 RX ADMIN — NICOTINE POLACRILEX 2 MG: 2 GUM, CHEWING ORAL at 13:17

## 2023-06-08 RX ADMIN — DOXYCYCLINE HYCLATE 100 MG: 50 CAPSULE ORAL at 08:07

## 2023-06-08 RX ADMIN — NICOTINE POLACRILEX 2 MG: 2 GUM, CHEWING ORAL at 17:55

## 2023-06-08 RX ADMIN — NICOTINE POLACRILEX 2 MG: 2 GUM, CHEWING ORAL at 19:18

## 2023-06-08 RX ADMIN — QUETIAPINE FUMARATE 150 MG: 100 TABLET ORAL at 20:06

## 2023-06-08 RX ADMIN — ROSUVASTATIN CALCIUM 10 MG: 10 TABLET, FILM COATED ORAL at 08:06

## 2023-06-08 RX ADMIN — INSULIN ASPART 2 UNITS: 100 INJECTION, SOLUTION INTRAVENOUS; SUBCUTANEOUS at 17:09

## 2023-06-08 RX ADMIN — NICOTINE POLACRILEX 2 MG: 2 GUM, CHEWING ORAL at 20:58

## 2023-06-08 RX ADMIN — AMLODIPINE BESYLATE 10 MG: 10 TABLET ORAL at 08:07

## 2023-06-08 ASSESSMENT — ACTIVITIES OF DAILY LIVING (ADL)
ADLS_ACUITY_SCORE: 45
HYGIENE/GROOMING: INDEPENDENT
ADLS_ACUITY_SCORE: 45
ADLS_ACUITY_SCORE: 45
ORAL_HYGIENE: INDEPENDENT
ADLS_ACUITY_SCORE: 45
ADLS_ACUITY_SCORE: 45
ORAL_HYGIENE: INDEPENDENT
ADLS_ACUITY_SCORE: 45
DRESS: INDEPENDENT
HYGIENE/GROOMING: INDEPENDENT
ADLS_ACUITY_SCORE: 45
DRESS: INDEPENDENT
LAUNDRY: UNABLE TO COMPLETE
ADLS_ACUITY_SCORE: 45

## 2023-06-08 NOTE — PROGRESS NOTES
Pt was able to take on a leadership role in dance/movement therapy (D/MT) group using peer-selected music.  He was focused on closure and therapeutic termination in this session, repeating some therapeutic themes from this and previous hospitalizations with this therapist.  He identified some next steps for him after discharge and expressed some anxiety about his time until discharge today.  He was able to verbalize his needs.         06/08/23 1315   Expressive Therapy   Therapy Type dance/movement   Minutes of Treatment 60

## 2023-06-08 NOTE — PLAN OF CARE
"Pt had an uneventful shift this day. Pt is looking forward to discharge this day. No complaints voiced. Pt was visible in the milieu, participated in groups and was social and appropriate with staff and peers. Pt declined to eat breakfast as he was \"too tired\" to get up. Novolog insulin given and the other was charted as refused d/t not eating. Pt did eat lunch, insulin coverage provided per MAR.    Pt denies SIB, HI and thoughts of hurting others. Pt denies depression and endorses situational anxiety. Pt requested and was provided PRN 25mg hydroxyzine for anxiety rated 6/10. Pt continues to endorse hearing voices but reports the voices are much quieter and calmer since admission. Pt states the medication helps. Pt states she has chronic thoughts of suicide but no denies plan or intent. Pt is future oriented and talks about plans for the future.    VS reviewed: /80   Pulse 103   Temp 98  F (36.7  C) (Temporal)   Resp 16   Wt 112.5 kg (248 lb)   LMP  (LMP Unknown)   SpO2 96%   BMI 40.95 kg/m   . Patient denies pain.    Length of stay: 12  "

## 2023-06-08 NOTE — CARE PLAN
06/08/23 1303   Group Therapy Session   Group Attendance attended group session   Time Session Began 1030   Time Session Ended 1200   Total Time (minutes) 90   Total # Attendees 1   Group Type task skill;psychoeducation   Group Topic Covered coping skills/lifestyle management;leisure exploration/use of leisure time   Group Session Detail mindfulness, mandala stone painting   Patient Response/Contribution cooperative with task;able to recall/repeat info presented;organized     Pt actively participated in a coping skill exploration group focused on guided painting of a decorative stone mandala. Pt engaged in a discussion regarding the themes related to the repetitive nature of mandalas including: meditation, balance, harmony, and relaxation. Pt indicated that they enjoyed the activity, and plans to engage in similar activities upon discharge (was given resource information for doing so).

## 2023-06-08 NOTE — CARE PLAN
Occupational Therapy Group Note:     06/08/23 1548   Group Therapy Session   Group Attendance attended group session   Time Session Began 1415   Time Session Ended 1500   Total Time (minutes) 45   Total # Attendees 2   Group Type recreation   Group Topic Covered leisure exploration/use of leisure time;structured socialization   Group Session Detail OT Leisure Group   Patient Response/Contribution confronted peers appropriately;cooperative with task;listened actively;offered helpful suggestions to peers;organized   Patient Participation Detail Patient offered ideas for leisure opportunities this date. Patient retrieved playing cards from his room and offered instructional cueing to group on various card games ([employer], [employer], [employer]). The focus of today's group was leisure exploration and engagement. Patient was able to engage purposefully in leisure activities; as well as, engage in reciprocal prosocial conversation. Patient made musical selections throughout group. Patient spoke about his excitement for discharge this date; especially to see and play with his dog (Nugget). Patient appeared anxious throughout group due to the waiting requirement to receive discharge paperwork. Patient was cooperative and pleasant in group.  At end of group, patient received news from [facility] that the discharge paperwork was not going to be received this date after all. Patient voiced being stressed about this and appeared very disappointed. Patient requested to leave group in order to make a phone call; writer approved. Patient still exhibited gratefulness at end of group for activity.

## 2023-06-08 NOTE — PROGRESS NOTES
Brief Medicine Note:    IM asked to assist with diabetes medication reconciliation for discharge. Per chart review and pharmacy consult 5/26, patient on Ozempic weekly and Lantus 15 units daily PTA. Patient has been started on sliding scale insulin and carb coverage this admission.     Writer called and spoke with group home nurse, Domenica ([phone]), regarding regimen and management at the group home. Domenica states that patient does all of their own diabetes management, and the group home does not assist with dosing or administration at all.   - Will hold off on carb coverage or sliding scale insulin on discharge  - Discharge on increased lantus dose of 22 units daily (ordered)  - Continue weekly Ozempic injections  - Follow up closely with PCP within 3-5 days for ongoing care  - Check blood glucose tid with meals and at bedtime. If blood glucose <70 or >350, call EMS  - Contact medicine service with any questions or concerns     Ashley Wynn PA-C  Internal Medicine [facility]  [phone]

## 2023-06-08 NOTE — PLAN OF CARE
Goal Outcome Evaluation:      Pt's blood sugar @ 0200 was 228.  Pt appeared to be a sleep @ all safety checks.  Pt slept 7 hours.

## 2023-06-08 NOTE — PLAN OF CARE
"  Problem: Adult Behavioral Health Plan of Care  Goal: Patient-Specific Goal (Individualization)  Description: You can add care plan individualizations to a care plan. Examples of Individualization might be:  \"Parent requests to be called daily at 9am for status\", \"I have a hard time hearing out of my right ear\", or \"Do not touch me to wake me up as it startles me\".  Outcome: Progressing   Goal Outcome Evaluation:    Plan of Care Reviewed With: patient        Patient is alert and oriented x 4, vitals are stable. Patient complained of constipation on the day shift. He was given suppository which was effective. According to day shift patient will not discharge tomorrow. Patient was visible on the unit the entire shift. Took all HS scheduled medications with zero problem. Appetite was good ate 100% of supper. Blood sugar at 1700 was 166 and at 2100 was 220.      At 1800 patient was asking for document that was faxed by his  that he was suppose to sign. Writer educated patient to talk to  tomorrow. Patient. was social with peers and staff. No behavior issues or any safety concern.           "

## 2023-06-08 NOTE — DISCHARGE SUMMARY
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 sliding scale insulin on 5/27/23. Review of BGs since initiation of SSI show improved response compared to prior. However, BGs still above goal (130s-260s).   - Will increase sliding scale insulin from medium to high  - Continue Semaglutide and Lantus as ordered  - BG checks TID AC, qHS, and at 0200 (while on insulin)  - Hypoglycemia protocol     Medicine will continue to follow along peripherally for BG management. Recommendations relayed to primary team via this progress note.       Pool Mclean PA-C  [facility] Medicine Progress Note 6/1/23     Internal medicine following peripheral for blood sugar management in setting of poorly controlled diabetes mellitus, type 2.  Hgb A1c on 5/5/23 was 9.1%  Prior to admission, patient managed with Semaglutide 1mg every Tuesday and Lantus 15 units every morning. Medicine team started him on sliding scale insulin 5/27, with an increase to high sliding scale on 5/30. Blood sugars remain elevated in the 200s. On average, has been receiving 4-7 units sliding scale per parameters since being placed on the high intensity sliding scale.      -Increase morning Lantus from 15 units to 18 units (this will start 6/2)   -add prandial insulin, 2 units novolog with meals.   -continue glucose checks before meals and at bedtime.      Internal medicine will continue to  follow      Gricelda Coello PA-C    Brief Medicine Progress Note 6/3/23     Internal Medicine following peripherally for blood sugar management. Blood sugars remain elevated in the 200-300 range.  Currently on high intensity sliding scale, prandial insulin (3 units with each meal) and Lantus 18 units every morning.      -increase prandial insulin coverage to 6 units   -Continue current Lantus dose      Internal medicine will continue to follow      Gricelda Coello PA-C    Brief Medicine Note 6/6/23:     IM following glucose in the setting of diabetes. Glucose remains elevated, ranging 240s-290s recently. Currently on Lantus 18 units daily, HSSI, and Novolog 6 units with meals  - Increase lantus to 22 units daily  - Continue HSSI and Novolog 6 units with meals  - Medicine will continue to follow     Ashley Wynn PA-C    Brief Medicine Note 6/8/23:     IM asked to assist with diabetes medication reconciliation for discharge. Per chart review and pharmacy consult 5/26, patient on Ozempic weekly and Lantus 15 units daily PTA. Patient has been started on sliding scale insulin and carb coverage this admission.      Writer called and spoke with group home nurse, Domenica ([phone]), regarding regimen and management at the group home. Domenica states that patient does all of their own diabetes management, and the group home does not assist with dosing or administration at all.   - Will hold off on carb coverage or sliding scale insulin on discharge  - Discharge on increased lantus dose of 22 units daily (ordered)  - Continue weekly Ozempic injections  - Follow up closely with PCP within 3-5 days for ongoing care  - Check blood glucose tid with meals and at bedtime. If blood glucose <70 or >350, call EMS  - Contact medicine service with any questions or concerns      Ashley Wynn PA-C         Hospital Course:   Prakash Prasad was admitted to [facility] with attending Tessa Mendoza MD as a voluntary  patient. The patient was placed under status 15 (15 minute checks) to ensure patient safety.     This patient is a 32 year old transgender male with history of mood disorder, psychosis and substance use who presented to ED with SI in context of increased medical concerns with abnormal LFTs and uncontrolled BG, possible medication induced haven/hypomania with medication changes and elevated anxiety. Medically cleared in ED, UDS positive for amphetamines (on adderall) and cannabinoids; was evaluated by psychiatry consult service. PTA olanzapine and clonazepam increased, prazosin started for PTSD and lamotrigine started for mood. Admitted to [facility] as voluntary patient and overflow patient for high acuity unit. PTA medications continued along with changes made to medications in ED with exception of Adderall discontinued for further evaluation by primary team. Pt also requests to take testosterone IM on 5/27 as has missed dose for 2 weeks due to increased depression/amotivation. Pt unsure of goals for hospitalization other to have SI, anxiety improved, likely will return to .      5/27: Testerone IM restarted     5/28: will resume PTA Adderall XR 20mg daily for ADHD, pt reports this has been well tolerated for >20 years with no change in psychosis, anxiety or mood state noted with changes in this medication, monitoring closely. Continue PTA clonazepam at increased dose of 1mg BID for anxiety.      5/30: Start BuSpar 10 mg 3 times daily for anxiety and Start Seroquel 50 mg 4 times daily as needed for anxiety     5/31:He says that Seroquel works better for him, QT QTc was normal on EKG, will order Seroquel (100 mg) at night and decrease Zyprexa dose to 20 mg QHS.  He gained weight on Zyprexa and blood glucoses more difficult to control on Zyprexa.  He says that uncontrolled blood glucose affects his mood 2. We discussed decreasing Klonopin to 0.5 mg twice daily for few days and then discontinuing it.  Controlled substance  is not the best option for him considering his history of chemical dependency. We also discussed addictive potential of Adderall, versus Strattera.      6/1: Filed for commitment without Ashley. Pt said that he wants to be under a commitment for more accountability and supports. He said that he functions better in the community when a commitment is in place. He did not agree to voluntary hospitalization and was not sena for safety at this time.     6/1-6/2: Continue current medications without changes.      6/3: Internal Medicine following peripherally for blood sugar management. Blood sugars remain elevated in the 200-300 range.  Currently on high intensity sliding scale, prandial insulin (3 units with each meal) and Lantus 18 units every morning.   -increase prandial insulin coverage to 6 units   -Continue current Lantus dose      6/5:   EKG with QTc of 484  Continue Klonopin taper and reduce dose by 25% (total of 0.75 mg daily)  Increase Seroquel to 150 mg at bedtime  Reduce Zyprexa to 15 mg at bedtime  Plan to continue both neuroleptics for now with ultimate goal of completing cross titration from Zyprexa to Seroquel on an outpatient basis.     6/7: Preliminary hearing held. Recommended stay of commitment as patient improving, sena for safety, denying active SI, future oriented. Requesting discharge due to lack of available nonpharmacologic coping strategies and concerns for decompensation in restrictive hospital setting.      6/9: Increased Lamictal to 50 mg daily with plan to titrate. Pt signed voluntary forms.     Prakash Prasad did participate in groups and was visible in the milieu.     The patient's symptoms of depression improved. Acute SI with plan resolved.     Prakash Prasad was released to group home on A STAY OF COMMITMENT WITHOUT ASHLEY and with day treatment referral in place. At the time of discharge Prakash Prasad was determined to not be a danger to himself or others.     RISK  ASSESSMENT:  Today Prakash Prasad denies SI, SIB, and HI. No overt evidence of psychosis or haven observed. Patient grossly appears to be cognitively intact. Patient has not exhibited any aggressive or violent behaviors throughout hospitalization. He has notable risk factors for self-harm including previous suicide attempt, recent psych inpt stay and new/ worsening medical issue.  However, risk is mitigated by no plan or intent, no access to lethal means, describes a safety plan, h/o seeking help when needed, symptom improvement, future oriented, feeling hopeful, none to minimal alcohol use , commitment to family, good social support  , stable housing, participation in DBT or day program and protective factors, including family and his dog, Nugget.. Patient does not have access to firearms. Based on all available evidence he does not appear to be at imminent risk for self-harm therefore does not meet criteria for a 72-hr hold/ involuntary hospitalization.  However, based on degree of symptoms therapy, DBT and close psych FU was recommended which the pt did agree to. Patient also agreed to call 911/present to ED if any imminent safety concerns arise, including emergence of SI, HI, symptoms of psychosis or haven. Patient was provided with crisis resources at the time of discharge. Patient agreed to further reduce risk of self-harm by completely abstaining from illicit substances and alcohol, and agreed to remain medication adherent. Expressed understanding of the risks associated with excessive alcohol use, illicit substance use, and medication/treatment non-adherence, including increased risk of harm to self or others.             Discharge Medications:     Discharge Medication List as of 6/9/2023  1:05 PM      START taking these medications    Details   artificial saliva (BIOTENE MT) SOLN solution Swish and spit 2 mLs (2 sprays) in mouth 4 times daily as needed for dry mouth, Disp-44.3 mL, R-0, E-Prescribe       bisacodyl (DULCOLAX) 10 MG suppository Place 1 suppository (10 mg) rectally daily as needed for constipation, Disp-30 suppository, R-0, E-Prescribe      busPIRone (BUSPAR) 10 MG tablet Take 1 tablet (10 mg) by mouth 3 times daily, Disp-90 tablet, R-0, E-Prescribe      ibuprofen (ADVIL/MOTRIN) 600 MG tablet Take 1 tablet (600 mg) by mouth every 6 hours as needed for inflammatory pain or moderate pain, No Print Out      lamoTRIgine (LAMICTAL) 25 MG tablet Take 2 tablets (50 mg) by mouth daily, Disp-60 tablet, R-0, E-Prescribe      magnesium hydroxide (MILK OF MAGNESIA) 400 MG/5ML suspension Take 30 mLs by mouth daily as needed for constipation or other (No results from senna colace and miralax), Disp-769 mL, R-0, E-Prescribe      melatonin 5 MG tablet Take 1 tablet (5 mg) by mouth At Bedtime, Disp-30 tablet, R-0, E-Prescribe      polyethylene glycol (MIRALAX) 17 GM/Dose powder Take 17 g by mouth daily, Disp-510 g, R-0, E-Prescribe      prazosin (MINIPRESS) 1 MG capsule Take 1 capsule (1 mg) by mouth At Bedtime, Disp-30 capsule, R-0, E-Prescribe      QUEtiapine 150 MG TABS Take 150 mg by mouth At Bedtime, Disp-30 tablet, R-0, E-Prescribe      senna-docusate (SENOKOT-S/PERICOLACE) 8.6-50 MG tablet Take 1 tablet by mouth 2 times daily as needed for constipation, Disp-60 tablet, R-0, E-Prescribe         CONTINUE these medications which have CHANGED    Details   amLODIPine (NORVASC) 10 MG tablet Take 1 tablet (10 mg) by mouth daily, Disp-30 tablet, R-0, E-Prescribe      amphetamine-dextroamphetamine (ADDERALL XR) 20 MG 24 hr capsule Take 1 capsule (20 mg) by mouth daily, Disp-30 capsule, R-0, Local Print      benzoyl peroxide 5 % external liquid Apply topically dailyDisp-226 g, [id number]      clonazePAM (KLONOPIN) 0.5 MG tablet Take 1 tablet (0.5 mg) by mouth At Bedtime, Disp-30 tablet, R-0, Local Print      cloNIDine (CATAPRES) 0.1 MG tablet Take 1 tablet (0.1 mg) by mouth daily as needed (anxiety), Disp-1 tablet,  "R-0, E-Prescribe      doxycycline monohydrate (MONODOX) 100 MG capsule Take 1 capsule (100 mg) by mouth 2 times daily, Disp-60 capsule, R-0, E-Prescribe      gabapentin (NEURONTIN) 600 MG tablet Take 2 tablets (1,200 mg) by mouth 3 times daily, Disp-180 tablet, R-0, E-Prescribe      OLANZapine (ZYPREXA) 15 MG tablet Take 1 tablet (15 mg) by mouth At Bedtime, Disp-30 tablet, R-0, E-Prescribe      omeprazole (PRILOSEC) 20 MG DR capsule Take 1 capsule (20 mg) by mouth daily, Disp-30 capsule, R-0, E-Prescribe      ondansetron (ZOFRAN ODT) 4 MG ODT tab Take 1 tablet (4 mg) by mouth daily as needed for nausea, Disp-30 tablet, R-0, E-Prescribe      rosuvastatin (CRESTOR) 10 MG tablet Take 1 tablet (10 mg) by mouth daily, Disp-30 tablet, R-0, E-Prescribe      tretinoin (RETIN-A) 0.05 % external cream Apply topically At Bedtime Pea-sized amount to whole faceDisp-45 g, [id number]      insulin glargine (LANTUS PEN) 100 UNIT/ML pen Inject 22 Units Subcutaneous every morning (before breakfast), Disp-15 mL, HistoricalIf Lantus is not covered by insurance, may substitute Basaglar or Semglee or other insulin glargine product per insurance preference at same dose and frequency.           CONTINUE these medications which have NOT CHANGED    Details   ACE/ARB/ARNI NOT PRESCRIBED (INTENTIONAL) Reason ACE/ARB was Not Prescribed: Patient of childbearing potentialHistorical      aspirin-acetaminophen-caffeine (EXCEDRIN MIGRAINE) 250-250-65 MG tablet Take 1 tablet by mouth daily as needed for headaches, Disp-30 tablet, R-0, E-Prescribe      blood glucose (NO BRAND SPECIFIED) test strip Use to test blood sugar one times daily and as needed. To accompany: Blood Glucose Monitor Brands: per insurance., Disp-100 strip, R-3, E-Prescribe      needle, disp, 18G X 1\" MISC Use to draw up weekly testosterone dose, Disp-50 each, R-1, E-Prescribe      Needle, Disp, 25G X 5/8\" MISC Use to inject weekly Testosterone dose, Disp-50 each, R-1, " E-Prescribe      syringe, disposable, 1 ML MISC Use to draw up and administer weekly testosterone dose, Disp-25 each, R-1, E-Prescribe      testosterone cypionate (DEPOTESTOSTERONE) 200 MG/ML injection Inject 0.1 mLs (20 mg) Subcutaneous once a week, Disp-5 mL, R-1, E-Prescribe      thin (NO BRAND SPECIFIED) lancets Use with lanceting device to check blood sugars once per day. To accompany: Blood Glucose Monitor Brands: per insurance., Disp-100 each, R-3, E-Prescribe      Semaglutide, 1 MG/DOSE, (OZEMPIC) 4 MG/3ML pen Inject 1 mg Subcutaneous every 7 days, Disp-3 mL, R-0, E-Prescribe         STOP taking these medications       ASPIRIN NOT PRESCRIBED (INTENTIONAL) Comments:   Reason for Stopping:                    Psychiatric Examination:   Appearance:  awake, alert and adequately groomed  Attitude:  cooperative  Eye Contact:  good  Mood:  better  Affect:  appropriate and in normal range, constricted mobility though did smile intermittently when talking about his dog  Speech:  clear, coherent  Psychomotor Behavior:  no evidence of tardive dyskinesia, dystonia, or tics  Thought Process:  logical, linear and goal oriented  Associations:  no loose associations  Thought Content:  no evidence of suicidal ideation or homicidal ideation and no evidence of psychotic thought  Insight:  good  Judgment:  intact  Oriented to:  time, person, and place  Attention Span and Concentration:  fair  Recent and Remote Memory:  intact  Language: Able to name objects, Able to repeat phrases and Able to read and write  Fund of Knowledge: appropriate  Muscle Strength and Tone: normal  Gait and Station: Normal         Discharge Plan:   Summary: You were admitted on 5/24/2023  due to suicidal ideation.  You were treated by Dr. Mendoza and discharged on 6/8/23 from [facility] to [facility].     Main Diagnosis: Suicidal ideation   Schizoaffective disorder, bipolar type, chronic, severe, acute episode appears mixed with psychosis  "symptoms active     Health Care Follow-up:   Follow Up Primary  Date/time: Tuesday June 13th, 2023 at 2:00 PM  In person  Provider: Anatoly Womack  Address: [facility], [address] [place] [id number]  Phone: [phone]     Follow Up Psychiatry  Date/time: Wednesday, June 14th at 2:00pm, In person  Provider: Regine Last  Address: [facility], [address] [place] [address]  Phone: [phone]           Attend all scheduled appointments with your outpatient providers. Call at least 24 hours in advance if you need to reschedule an appointment to ensure continued access to your outpatient providers.      Major Treatments, Procedures and Findings:  You were provided with: a psychiatric assessment, assessed for medical stability, medication evaluation and/or management, group therapy, milieu management, and medical interventions     Symptoms to Report: mood getting worse or thoughts of suicide     Early warning signs can include: increased depression or anxiety increased thoughts or behaviors of suicide or self-harm        Resources:   Crisis Intervention: 711.555.1281 or 100-431-8320 (TTY: 624.502.5489).  Call anytime for help.  National [place] on Mental Illness (www.mn.jhonatan.org): 806.694.8846 or 513-880-0219.  [place] Association for Children's Mental Health (www.macmh.org): 176.341.8180.  Suicide Awareness Voices of Education (SAVE) (www.save.org): 697-343-OOFP (4883)  National Suicide Prevention Line (www.mentalhealthmn.org): 311-469-GAXR (4075)  Mental Health Consumer/Survivor Network of [place] (www.mhcsn.net): 630.692.9058 or 077-684-3966  Mental Health Association of [place] (www.mentalhealth.org): 703.913.1631 or 473-907-5258  Self- Management and Recovery Training., SMART-- Toll free: 677.720.1360  www.Veronica.org  [facility] Crisis (COPE) Response - Adult 958 268-8367  Text 4 Life: txt \"LIFE\" to 91781 for immediate support and crisis intervention  Crisis text " "line: Text \"[place]\" to 298147. Free, confidential, 24/7.  Crisis Intervention: 474.729.2572 or 204-129-2871. Call anytime for help.   [facility] Crisis Team - Child: [phone]     General Medication Instructions:   See your medication sheet(s) for instructions.   Take all medicines as directed.  Make no changes unless your doctor suggests them.   Go to all your doctor visits.  Be sure to have all your required lab tests. This way, your medicines can be refilled on time.  Do not use any drugs not prescribed by your doctor.  Avoid alcohol.     Advance Directives:   Scanned document on file with [employer]? No scanned doc  Is document scanned? Pt states no documents  Honoring Choices Your Rights Handout: Informed and given  Was more information offered? Materials given     The Treatment team has appreciated the opportunity to work with you. If you have any questions or concerns about your recent admission, you can contact the unit which can receive your call 24 hours a day, 7 days a week. They will be able to get in touch with a Provider if needed. The unit number is [phone] .    Attestation:  The patient has been seen and evaluated by me,  Tessa Mendoza MD     > 70 minutes total time that was spent and over 50% of this time was spent in counseling and coordination of care with staff, reviewing medical record, educating patient about treatment options, side effects and benefits and alternative treatments for medications, providing supportive therapy and redirection regarding above symptoms.     This document is created with the help of Dragon dictation system.  All grammatical/typing errors or context distortion are unintentional and inherent to software.         "

## 2023-06-08 NOTE — PLAN OF CARE
Assessment/Intervention/Current Symptoms and Care Coordination:  Met with patient. Reviewed chart. Met with team to review patient care. Met with patient and brought him the order for stay of commitment and plan of services paperwork which he signed, writer securely emailed these to Amanda Reeves and his , Samantha Bell. Waiting on paperwork for discharge.      Discharge Plan or Goal:  Discharge back to [facility] in [place]      Barriers to Discharge:  Discharge pending symptom stabilization, currently awaiting commitment      Referral Status:  Interviewed with [facility] Day Treatment on      Legal Status:  Court Hold    [facility]  File Number: [id number]  Final Hearin/12 at D    Contacts:  : Samantha Bell, [phone]  : Cristy at [facility], [phone] (VAL signed)   ARMHS: Deena at [facility], [phone] (VAL signed)   [facility] Director and Nurse: Domenica [phone] (VAL signed)   CADI worker: Pretty Guzman at [employer], [phone] (VAL signed)   Psychotherapist: Joshua at [employer], [phone] (VAL signed)      Upcoming Meetings and Dates/Important Information and next steps:  Psychiatrist follow-up scheduled   Final hearing scheduled for , stay of commitment proposed to [facility] Attorney's Office

## 2023-06-09 VITALS
DIASTOLIC BLOOD PRESSURE: 89 MMHG | SYSTOLIC BLOOD PRESSURE: 133 MMHG | BODY MASS INDEX: 40.95 KG/M2 | HEART RATE: 103 BPM | OXYGEN SATURATION: 96 % | WEIGHT: 248 LBS | RESPIRATION RATE: 16 BRPM | TEMPERATURE: 97.8 F

## 2023-06-09 LAB
GLUCOSE BLDC GLUCOMTR-MCNC: 184 MG/DL (ref 70–99)
GLUCOSE BLDC GLUCOMTR-MCNC: 211 MG/DL (ref 70–99)
GLUCOSE BLDC GLUCOMTR-MCNC: 226 MG/DL (ref 70–99)

## 2023-06-09 PROCEDURE — 250N000013 HC RX MED GY IP 250 OP 250 PS 637: Performed by: PSYCHIATRY & NEUROLOGY

## 2023-06-09 RX ADMIN — DEXTROAMPHETAMINE SULFATE, DEXTROAMPHETAMINE SACCHARATE, AMPHETAMINE SULFATE AND AMPHETAMINE ASPARTATE 20 MG: 5; 5; 5; 5 CAPSULE, EXTENDED RELEASE ORAL at 08:23

## 2023-06-09 RX ADMIN — HYDROXYZINE HYDROCHLORIDE 25 MG: 25 TABLET, FILM COATED ORAL at 02:39

## 2023-06-09 RX ADMIN — AMLODIPINE BESYLATE 10 MG: 10 TABLET ORAL at 08:45

## 2023-06-09 RX ADMIN — LAMOTRIGINE 50 MG: 25 TABLET ORAL at 08:22

## 2023-06-09 RX ADMIN — INSULIN ASPART 3 UNITS: 100 INJECTION, SOLUTION INTRAVENOUS; SUBCUTANEOUS at 08:40

## 2023-06-09 RX ADMIN — NICOTINE 1 PATCH: 21 PATCH, EXTENDED RELEASE TRANSDERMAL at 08:23

## 2023-06-09 RX ADMIN — NICOTINE POLACRILEX 2 MG: 2 GUM, CHEWING ORAL at 09:36

## 2023-06-09 RX ADMIN — NICOTINE POLACRILEX 2 MG: 2 GUM, CHEWING ORAL at 14:18

## 2023-06-09 RX ADMIN — INSULIN ASPART 6 UNITS: 100 INJECTION, SOLUTION INTRAVENOUS; SUBCUTANEOUS at 12:11

## 2023-06-09 RX ADMIN — BUSPIRONE HYDROCHLORIDE 10 MG: 10 TABLET ORAL at 08:23

## 2023-06-09 RX ADMIN — NICOTINE POLACRILEX 2 MG: 2 GUM, CHEWING ORAL at 08:35

## 2023-06-09 RX ADMIN — GABAPENTIN 1200 MG: 600 TABLET, FILM COATED ORAL at 08:22

## 2023-06-09 RX ADMIN — OMEPRAZOLE 20 MG: 20 CAPSULE, DELAYED RELEASE ORAL at 08:22

## 2023-06-09 RX ADMIN — ACETAMINOPHEN 650 MG: 325 TABLET, FILM COATED ORAL at 02:40

## 2023-06-09 RX ADMIN — NICOTINE POLACRILEX 2 MG: 2 GUM, CHEWING ORAL at 12:26

## 2023-06-09 RX ADMIN — ROSUVASTATIN CALCIUM 10 MG: 10 TABLET, FILM COATED ORAL at 08:22

## 2023-06-09 RX ADMIN — DOXYCYCLINE HYCLATE 100 MG: 50 CAPSULE ORAL at 08:22

## 2023-06-09 RX ADMIN — INSULIN ASPART 2 UNITS: 100 INJECTION, SOLUTION INTRAVENOUS; SUBCUTANEOUS at 12:12

## 2023-06-09 ASSESSMENT — ACTIVITIES OF DAILY LIVING (ADL)
ADLS_ACUITY_SCORE: 45

## 2023-06-09 NOTE — PLAN OF CARE
Goal Outcome Evaluation:      Pt a wake x1 briefly otherwise appeared to be a sleep at all other safety checks.  Pt slept 6.25.

## 2023-06-09 NOTE — PLAN OF CARE
Goal Outcome Evaluation:  Pt discharged to [facility] with referral to primary care provider. All belongings returned to pt, including locker contents; no items sent to security. Discharge medications provided to patient. Pt denies all mental health symptoms at this time, including SI,SIB, anxiety, depression and HI. Patient contracted for safety and is optimistic about discharge.

## 2023-06-09 NOTE — PLAN OF CARE
"  Problem: Adult Behavioral Health Plan of Care  Goal: Patient-Specific Goal (Individualization)  Description: You can add care plan individualizations to a care plan. Examples of Individualization might be:  \"Parent requests to be called daily at 9am for status\", \"I have a hard time hearing out of my right ear\", or \"Do not touch me to wake me up as it startles me\".  Outcome: Progressing   Goal Outcome Evaluation:    Patient pleasant and cooperative on this shift. He was visible on the unit throughout the shift  sitting in the lounge. Blood sugar at 1700 was 182 and . Patient will be discharged tomorrow, discharge medications are in the med room.  Patient reported eating 100% of supper and bed time snacks. Patient denied all mental health symptoms. Took all scheduled medications with no problem.  No complain of pain or any discomfort. No behavior issues or any safety concern noted on this shift.           "

## 2023-06-09 NOTE — PROGRESS NOTES
[facility], [place]   Psychiatric Progress Note  Hospital Day: 12        Interim History:   The patient's care was discussed with the treatment team during the daily team meeting and/or staff's chart notes were reviewed.  Staff report patient reports AH though overall improving and no longer command in nature. Less frequent SI. Sleeping well per chart review. No agitation or aggression. NO SIB. Reporting constipation. Had enema with good results.     Upon interview, Prakash says that he feels stable for discharge. He is hoping for discharge today. Settled on stay of commitment.     Suicidal ideation: Chronic, passive SI though no current intent or plan. Denying SIB urges again today.     Homicidal ideation: denies current or recent homicidal ideation or behaviors.    Psychotic symptoms: Patient denies command AH, VH, delusions.     Medication side effects reported: No reported side effects    Acute medical concerns: none, longstanding lower back pain    Other issues reported by patient: Patient had no further questions or concerns.           Medications:       amLODIPine  10 mg Oral Daily     amphetamine-dextroamphetamine  20 mg Oral Daily     benzoyl peroxide   Topical Daily     busPIRone  10 mg Oral TID     clonazePAM  0.25 mg Oral Daily     clonazePAM  0.5 mg Oral At Bedtime     doxycycline hyclate  100 mg Oral BID     gabapentin  1,200 mg Oral TID     insulin aspart  6 Units Subcutaneous Daily with supper     insulin aspart  6 Units Subcutaneous Daily with lunch     insulin aspart  6 Units Subcutaneous Daily with breakfast     insulin aspart  1-10 Units Subcutaneous TID AC     insulin aspart  1-7 Units Subcutaneous At Bedtime     insulin glargine  22 Units Subcutaneous QAM AC     lamoTRIgine  25 mg Oral Daily     melatonin  5 mg Oral At Bedtime     nicotine  1 patch Transdermal Daily    And     nicotine   Transdermal Q8H [facility]     OLANZapine  15 mg Oral At Bedtime     omeprazole  20 mg  Oral Daily     polyethylene glycol  17 g Oral Daily     prazosin  1 mg Oral At Bedtime     QUEtiapine  150 mg Oral At Bedtime     rosuvastatin  10 mg Oral Daily     Semaglutide (1 MG/DOSE)  1 mg Subcutaneous Q7 Days     testosterone cypionate  20 mg Subcutaneous Weekly     tretinoin   Topical At Bedtime          Allergies:     Allergies   Allergen Reactions     Haldol [Haloperidol] Other (See Comments)     Makes patient very angry and anxious     Adhesive Tape Hives     Percocet [Oxycodone-Acetaminophen] Nausea and Vomiting     Prednisone Other (See Comments) and Hives     Suicidal ideation     Risperidone Other (See Comments)     Tramadol Hcl Nausea and Vomiting     Droperidol Anxiety     Seroquel [Quetiapine] Palpitations     Spent 2 weeks in the hospital due to having seroquel, caused palpitations and QT prolongation          Labs:     Recent Results (from the past 24 hour(s))   Glucose by meter    Collection Time: 06/07/23  9:09 PM   Result Value Ref Range    GLUCOSE BY METER POCT 220 (H) 70 - 99 mg/dL   Glucose by meter    Collection Time: 06/08/23  1:57 AM   Result Value Ref Range    GLUCOSE BY METER POCT 228 (H) 70 - 99 mg/dL   Glucose by meter    Collection Time: 06/08/23  7:44 AM   Result Value Ref Range    GLUCOSE BY METER POCT 259 (H) 70 - 99 mg/dL   Glucose by meter    Collection Time: 06/08/23 12:01 PM   Result Value Ref Range    GLUCOSE BY METER POCT 199 (H) 70 - 99 mg/dL   Glucose by meter    Collection Time: 06/08/23  5:04 PM   Result Value Ref Range    GLUCOSE BY METER POCT 182 (H) 70 - 99 mg/dL          Psychiatric Examination:     /80 (BP Location: Left arm, Patient Position: Sitting, Cuff Size: Adult Large)   Pulse 104   Temp 97.5  F (36.4  C) (Temporal)   Resp 16   Wt 112.5 kg (248 lb)   LMP  (LMP Unknown)   SpO2 96%   BMI 40.95 kg/m    Weight is 248 lbs 0 oz  Body mass index is 40.95 kg/m .    Weight over time:  Vitals:    05/27/23 1225 05/30/23 0748 06/04/23 0730   Weight: 107.9  "kg (237 lb 12.8 oz) 108.6 kg (239 lb 6.4 oz) 112.5 kg (248 lb)       Orthostatic Vitals       Most Recent      Standing Orthostatic /73 05/31 0824    Standing Orthostatic Pulse (bpm) 117 05/31 0824            Cardiometabolic risk assessment. 06/01/23      Reviewed patient profile for cardiometabolic risk factors    Date taken /Value  REFERENCE RANGE   Abdominal Obesity  (Waist Circumference)   See nursing flowsheet Women ?35 in (88 cm)   Men ?40 in (102 cm)      Triglycerides  Triglycerides   Date Value Ref Range Status   05/28/2023 240 (H) <150 mg/dL Final   04/28/2021 317 (H) <150 mg/dL Final       ?150 mg/dL (1.7 mmol/L) or current treatment for elevated triglycerides   HDL cholesterol  HDL Cholesterol   Date Value Ref Range Status   04/28/2021 37 (L) >40 mg/dL Final     Direct Measure HDL   Date Value Ref Range Status   05/28/2023 35 (L) >=40 mg/dL Final   ]   Women <50 mg/dL (1.3 mmol/L) in women or current treatment for low HDL cholesterol  Men <40 mg/dL (1 mmol/L) in men or current treatment for low HDL cholesterol     Fasting plasma glucose (FPG) Lab Results   Component Value Date     06/01/2023     05/05/2023     05/05/2023     05/19/2021      FPG ?100 mg/dL (5.6 mmol/L) or treatment for elevated blood glucose   Blood pressure  BP Readings from Last 3 Encounters:   06/08/23 115/80   05/22/23 138/88   04/19/23 122/78    Blood pressure ?130/85 mmHg or treatment for elevated blood pressure   Family History  See family history     Mental Status Exam:  Appearance: awake, alert and adequately groomed  Attitude:  cooperative  Eye Contact:  good  Mood:  \"better\"  Affect:  mood congruent, blunted  Speech:  clear, coherent  Language: fluent and intact in English  Psychomotor, Gait, Musculoskeletal:  no evidence of tardive dyskinesia, dystonia, or tics  Throught Process:  logical, linear and goal oriented  Associations:  no loose associations  Thought Content:  passive suicidal " ideation present and auditory hallucinations present. Likely present intermittently at baseline.   Insight:  fair  Judgement:  fair  Oriented to:  time, person, and place  Attention Span and Concentration:  intact  Recent and Remote Memory:  intact  Fund of Knowledge:  appropriate    Clinical Global Impressions  First:  Considering your total clinical experience with this particular patient population, how severe are the patient's symptoms at this time?: 7 (05/27/23 1309)  Compared to the patient's condition at the START of treatment, this patient's condition is: 4 (05/27/23 1309)  Most recent:  Considering your total clinical experience with this particular patient population, how severe are the patient's symptoms at this time?: 7 (05/27/23 1309)  Compared to the patient's condition at the START of treatment, this patient's condition is: 4 (05/27/23 1309)           Precautions:     Behavioral Orders   Procedures     Code 1 - Restrict to Unit     Routine Programming     As clinically indicated     Status 15     Every 15 minutes.     Suicide precautions     Patients on Suicide Precautions should have a Combination Diet ordered that includes a Diet selection(s) AND a Behavioral Tray selection for Safe Tray - with utensils, or Safe Tray - NO utensils            Diagnoses:     Schizoaffective disorder, bipolar type (H)  Borderline personality disorder  Moderate episode of recurrent major depressive disorder (H)  Suicidal ideation  Lab test negative for COVID-19 virus  Prolonged QTc         Assessment & Plan:     Assessment and hospital summary:  This patient is a 32 year old transgender male with history of mood disorder, psychosis and substance use who presented to ED with SI in context of increased medical concerns with abnormal LFTs and uncontrolled BG, possible medication induced haven/hypomania with medication changes and elevated anxiety. Medically cleared in ED, UDS positive for amphetamines (on adderall) and  cannabinoids; was evaluated by psychiatry consult service. PTA olanzapine and clonazepam increased, prazosin started for PTSD and lamotrigine started for mood. Admitted to [facility] as voluntary patient and overflow patient for high acuity unit. PTA medications continued along with changes made to medications in ED with exception of Adderall discontinued for further evaluation by primary team. Pt also requests to take testosterone IM on 5/27 as has missed dose for 2 weeks due to increased depression/amotivation. Pt unsure of goals for hospitalization other to have SI, anxiety improved, likely will return to . Inpatient psychiatric hospitalization is warranted at this time for safety, stabilization, and possible adjustment in medications.    5/27: Testerone IM restarted    5/28: will resume PTA Adderall XR 20mg daily for ADHD, pt reports this has been well tolerated for >20 years with no change in psychosis, anxiety or mood state noted with changes in this medication, monitoring closely. Continue PTA clonazepam at increased dose of 1mg BID for anxiety.      5/30: Start BuSpar 10 mg 3 times daily for anxiety and Start Seroquel 50 mg 4 times daily as needed for anxiety    5/31:He says that Seroquel works better for him, QT QTc was normal on EKG, will order Seroquel (100 mg) at night and decrease Zyprexa dose to 20 mg QHS.  He gained weight on Zyprexa and blood glucoses more difficult to control on Zyprexa.  He says that uncontrolled blood glucose affects his mood 2. We discussed decreasing Klonopin to 0.5 mg twice daily for few days and then discontinuing it.  Controlled substance is not the best option for him considering his history of chemical dependency. We also discussed addictive potential of Adderall, versus Strattera.     6/1-6/2: Continue current medications without changes.     6/3: Internal Medicine following peripherally for blood sugar management. Blood sugars remain elevated in the 200-300 range.  Currently  on high intensity sliding scale, prandial insulin (3 units with each meal) and Lantus 18 units every morning.   -increase prandial insulin coverage to 6 units   -Continue current Lantus dose     6/5: EKG with QTc of 484    Today's Changes:  Increase Lamictal on 6/9  Day treatment referral placed.   Ordered discharge medications  Coordinated with IM to order diabetes medications at time of discharge  Plan is for discharge back to  tomorrow    Target psychiatric symptoms and interventions:  Continue Zyprexa 15 mg nightly for mood stabilization and psychosis  Continue Seroquel 150 mg nightly for augmentation of Zyprexa, sleep, anxiety monitor  Continue melatonin 5 mg at bedtime  Seroquel 50 mg 4 times daily as needed for anxiety  BuSpar 10 mg 3 times daily for anxiety  Zyprexa 10 mg daily as needed p.o. or IM for psychosis, agitation and aggression  Prazosin 1 mg nightly for nightmares of PTSD  Depo testosterone injection 20 mg subcu weekly, first dose on 5/27/2023  If given IM, it  has to be given in glluleus   Lamictal 25 mg daily for mood stabilization, will titrate (initiated on 5/26)  Klonopin 0.25 mg daily and 0.5 mg QHS  Neurontin 1200 mg 3 times daily for anxiety and mood stabilization  Adderall XR 20 mg daily for ADHD    Risks, benefits, and alternatives discussed at length with patient.     Acute Medical Problems and Treatments:  Acute medical concerns:  Medical concerns:   1) Type 2 DM on insulin, poorly controlled, last A1c 9.1 on 5/5, had insulin dose increased on 5/22  -continue Lantus 15 units every morning before breakfast  -also per chart was prescribed semaglutide weekly, last dose 5/16, can use home supply if  brings in  -BG checks ordered along with hypoglycemia medications   -IM consult placedfor further management, appreciate assistance, per note 6/6:      Brief Medicine Note:     IM following glucose in the setting of diabetes. Glucose improved over the past 24 hours with changed made 6/6.  Currently on Lantus 22 units daily, HSSI, and Novolog 6 units with meals  - Continue current regimen  - Contact medicine if glucose spikes >350, <70, or if consistently >250  - Medicine will sign off. Please contact medicine with future questions or concerns      Ashley Wynn PA-C     2) Elevated LFTs, hx of fatty liver per chart   -repeat CMP shows LFTs downtrending   -was evaluated by PCP on 5/22, recommended avoiding acetaminophen and alcohol with GI referral and Abd US ordered   -will defer additional work up to IM      3) Hypertriglyceridemia history   -ordered lipid panel as above, per chart has declined to take fish oil when recommended by PCP  -lipid panel with (H) and HDL 35(L) otherwise WNL   -recommend follow up with PCP as outpatient      4) Leukocytosis on admit WBC 11.4(H)  - repeat WBC this AM normalized at 8.7  -no other evidence of infection   -continue to monitor     5) HTN  -monitor BPs  -continued PTA amlodipine   -follow up with PCP     6) Acne  -continued PTA doxycycline, benzoyl peroxide and tretinoin  -follow up with PCP/derm on discharge      7) Sinus tachycardia and borderline prolonged QTc of 476ms per EKG on 5/27  -continue to monitor vitals, electrolytes were WNL on admission   -recommend rechecking EKG periodically and avoiding QTc prolonging agents as able   - repeat EKG on 6/5 with QTc of 484. Will continue to monitor and will avoid QTc prolonging agents as able.     Pertinent labs/imaging:  TSH WNL and lipid panel with (H) and HDL 35(L) otherwise WNL; repeat WBC WNL; repeat CMP showed LFTs down trending with AST 76(H) and ALT 82(H), Ca now WNL and glucose 154(H); EKG with Sinus tachycardia with  and QTc of 476ms     Behavioral/Psychological/Social:  - Encourage unit programming    Safety:  - Continue precautions as noted above  - Status 15 minute checks  - Suicide precautions    Legal Status: Court hold. Filled for MI commitment through [facility] on  6/1. Preliminary hearing today.     Disposition Plan   Reason for ongoing admission: Pt is improving with resolution of active SI and SIB urges. Awaiting outcome of commitment process. Discharge on 6/9 expected.   Discharge location: group home with day treatment in place  Discharge Medications: not ordered  Follow-up Appointments: not scheduled    Entered by: Tessa Mendoza MD on 6/8/2023 at 7:58 PM

## 2023-06-09 NOTE — PLAN OF CARE
Problem: Plan of Care - These are the overarching goals to be used throughout the patient stay.    Goal: Readiness for Transition of Care  Outcome: Progressing  Patient was visible in the lounge area, alert and oriented x3 with a bright affect. His mood is calm with normal speech. He is preoccupied with discharge and would ask writer frequently about when is he leaving. He is compliant with medication administration and vitals check. His morning blood sugar was 211 and afternoon was 184, and insulin was given. Patient did not complain of pain this shift. Patient refused his afternoon medication.

## 2023-06-12 ENCOUNTER — TELEPHONE (OUTPATIENT)
Dept: PSYCHIATRY | Facility: CLINIC | Age: 33
End: 2023-06-12

## 2023-06-12 ENCOUNTER — VIRTUAL VISIT (OUTPATIENT)
Dept: FAMILY MEDICINE | Facility: CLINIC | Age: 33
End: 2023-06-12
Payer: MEDICARE

## 2023-06-12 DIAGNOSIS — E11.65 TYPE 2 DIABETES MELLITUS WITH HYPERGLYCEMIA, WITHOUT LONG-TERM CURRENT USE OF INSULIN (H): Primary | ICD-10-CM

## 2023-06-12 PROCEDURE — 99442 PR PHYSICIAN TELEPHONE EVALUATION 11-20 MIN: CPT | Mod: 95 | Performed by: PHYSICIAN ASSISTANT

## 2023-06-12 ASSESSMENT — ENCOUNTER SYMPTOMS
FEVER: 0
VOMITING: 0
NAUSEA: 0

## 2023-06-12 NOTE — PATIENT INSTRUCTIONS
I sent a prescription for Lantus/insulin glargine, to your pharmacy.  You will be restarted at 15 units daily which is what you were taking prior to your hospital admission.  I also sent a refill of Ozempic.  Please continue this medication as prescribed.  Continue with your diabetes follow-up in July with Becki, your primary care provider, as scheduled.  Make sure to follow-up in clinic for any new or worsening symptoms.

## 2023-06-12 NOTE — TELEPHONE ENCOUNTER
Writer attempted to call patient for TCM. VINCENZOM requesting a call back at the main clinic number.

## 2023-06-12 NOTE — PROGRESS NOTES
Prakash is a 32 year old who is being evaluated via a billable telephone visit.      What phone number would you like to be contacted at? [phone]  How would you like to obtain your AVS? Reigna  Distant Location (provider location):  On-site    Assessment & Plan     Type 2 diabetes mellitus with hyperglycemia, without long-term current use of insulin (H)  Patient is a 32-year-old who presents to telephone visit for refills of Lantus and Ozempic as well as for further recommendations regarding dosing given changes in dosing during recent hospitalization.  Prior to hospitalization PCP did increase Lantus dose to 15 units daily based on elevated A1c.  Most recent Lantus prescription was for 22 units daily, but unable to find documentation regarding changes.  We will continue 15 units daily as prescribed by PCP.  Refill of Ozempic prescribed.  Recommended follow-up with PCP as scheduled in 1 month for reevaluation and further diabetes management.    - Semaglutide, 1 MG/DOSE, (OZEMPIC) 4 MG/3ML pen; Inject 1 mg Subcutaneous every 7 days  - insulin glargine (LANTUS PEN) 100 UNIT/ML pen; Inject 15 Units Subcutaneous every morning (before breakfast) Take 15 units daily. Take half dose, 7 units, on days you do not eat       See Patient Instructions    Narcisa Arreaga PA-C  [facility] SHAILESH Randall is a 32 year old, presenting for the following health issues:  Recheck Medication (Patient needs follow up to get refills of his insulin. Patient has been out since Friday.)        6/12/2023     1:05 PM   Additional Questions   Roomed by Deena MUJICA MA   Accompanied by No one         6/12/2023     1:05 PM   Patient Reported Additional Medications   Patient reports taking the following new medications None     HPI     Patient notes he was hospitalized [facility] 24-June 9th. Prior to hospitalization patient was using 11U long acting insulin daily. Patient was increased to 15U daily by PCP due to elevated A1C  and then during hospitalization was placed on sliding scale insulin. Patient is unsure if he needs to continue short acting insulin and which dose of long acting insulin to use. Ozempic got left at hospital per patient thus he would like refill.     Per chart review most recent RX for Lantus was 22U daily.       Review of Systems   Constitutional: Negative for fever.   Gastrointestinal: Negative for nausea and vomiting.            Objective           Vitals:  No vitals were obtained today due to virtual visit.    Physical Exam   healthy, alert and no distress  PSYCH: Alert and oriented times 3; coherent speech, normal   rate and volume, able to articulate logical thoughts, able   to abstract reason, no tangential thoughts, no hallucinations   or delusions  His affect is normal  RESP: No cough, no audible wheezing, able to talk in full sentences  Remainder of exam unable to be completed due to telephone visits                Phone call duration: 11 minutes

## 2023-06-12 NOTE — TELEPHONE ENCOUNTER
"Received a call back from patient who states he is \"doing alright.\" He reports he has been experiencing a little bit of passive SI since Sunday and identifies trigger of his group home being a bit chaotic. Patient states he is comfortable talking to group home staff if he has safety concerns.    Rcvd Discharge Summary: Yes  Compare Pt Discharge summary to that in EPIC: Yes  Able to get meds filled: Yes - except for insulin. Patient is out of this medication and planning to reach out to PCP's office for refill. Patient states his appointment was canceled as his PCP is out for the month.  Mood: Depressed. Patient reports he has been isolating more  SE: No    DC Plan:   Admit Date: 05/24/2023  Discharge date: 06/09/2023  Next appt: 06/14/2023  Referrals (therapy, daytx, homecare, ect): MTM and day treatment. Patient is unsure when day treatment is supposed to start. He states that he had an intake when he was inpatient and they told him to call when he was discharged.  Crisis Plan: Go to the hospital.   Contact Numbers Reviewed: Yes    TCM:  Direct contact made with pt within 2 business days? Yes  Pt is schedule in clinic within 14 days of discharge? Yes  Pt qualifies for TCM visit? Yes      RECENT SYMPTOMS:   DEPRESSION:  reports-suicidal ideation and depressed mood;    Physical health: N/A  Sleep: yes Sleeping 12-14 hours per night - feeling well rested after.  Racing thoughts, haven (where applicable): no  Patient pregnant (where applicable): unknown  Mood (include other cultures' views - heavy heart, heaviness, pain): depressed  Thought Process/Associations: unremarkable   Thought Content   Include ideas of reference, thought broadcasting/insertion, religiosity  Include specificity of target, credibility of threat):  Reports none;  Denies none  Perception:  Reports auditory hallucinations; patient reports these are baseline and feels they are manageable.  Insight: adequate  Judgment: appropriate  Thoughts of " non-suicidal self-harm: no  Has the patient acted on these? no    RECENT SUBSTANCE USE:     Alcohol: no      Tobacco: no     Caffeine: None reported  Opioids: no         Narcan kit: no        Cannabis: no            Other illicit drugs: none  Educate on alcohol, substance interaction with medications    MEDICATIONS:  Review medications with patient.  Taking medications as prescribed: yes  New medications added by other providers: yes  Herbal supplement use: no  Taking medications as prescribed: yes  Difficulty obtaining refills: no  Medication side effect concerns: no  Questions about meds: no

## 2023-06-13 ENCOUNTER — TELEPHONE (OUTPATIENT)
Dept: BEHAVIORAL HEALTH | Facility: CLINIC | Age: 33
End: 2023-06-13
Payer: MEDICARE

## 2023-06-13 ENCOUNTER — TELEPHONE (OUTPATIENT)
Dept: FAMILY MEDICINE | Facility: CLINIC | Age: 33
End: 2023-06-13
Payer: MEDICARE

## 2023-06-13 NOTE — TELEPHONE ENCOUNTER
Writer contacted patient regarding placement on the program waitlist, requested a call back by tomorrow (6/14) end of day if patient is interested in programming at this time. Provided call back information, [phone].    Rosa Martinez, [facility] on 6/13/2023 at 10:40 AM

## 2023-06-13 NOTE — TELEPHONE ENCOUNTER
MTM referral from: Transitions of Care (recent hospital discharge or ED visit)    MTM referral outreach attempt #2 on June 13, 2023 at 9:45 AM      Outcome: Patient not reachable after several attempts, will route to MT Pharmacist/Provider as an FYI.  [facility] scheduling number is [phone].  Thank you for the referral.    Use VBC (cindy) for the carrier/Plan on the flowsheet    Ayana Lincoln - [facility]

## 2023-06-14 ENCOUNTER — VIRTUAL VISIT (OUTPATIENT)
Dept: PSYCHIATRY | Facility: CLINIC | Age: 33
End: 2023-06-14
Attending: NURSE PRACTITIONER
Payer: MEDICARE

## 2023-06-14 DIAGNOSIS — Z79.899 MEDICATION MANAGEMENT: ICD-10-CM

## 2023-06-14 DIAGNOSIS — F41.9 ANXIETY: ICD-10-CM

## 2023-06-14 DIAGNOSIS — F25.0 SCHIZOAFFECTIVE DISORDER, BIPOLAR TYPE (H): Primary | ICD-10-CM

## 2023-06-14 PROCEDURE — 99214 OFFICE O/P EST MOD 30 MIN: CPT | Mod: VID | Performed by: NURSE PRACTITIONER

## 2023-06-14 RX ORDER — CLONIDINE HYDROCHLORIDE 0.1 MG/1
0.1 TABLET ORAL DAILY PRN
Qty: 30 TABLET | Refills: 1 | Status: SHIPPED | OUTPATIENT
Start: 2023-06-14 | End: 2023-07-11

## 2023-06-14 RX ORDER — QUETIAPINE FUMARATE 200 MG/1
200 TABLET, FILM COATED ORAL DAILY
Qty: 30 TABLET | Refills: 1 | Status: SHIPPED | OUTPATIENT
Start: 2023-06-14 | End: 2023-07-11

## 2023-06-14 NOTE — NURSING NOTE
Is the patient currently in the state of [place]? YES    Visit mode:VIDEO    If the visit is dropped, the patient can be reconnected by: VIDEO VISIT: Text to cell phone: [phone]    Will anyone else be joining the visit? NO      How would you like to obtain your AVS? MyChart    Are changes needed to the allergy or medication list? NO    Reason for visit: RECHECK    PHQ not complete per department protocol.    Lauryn Read, SARAH on 6/14/2023 at 1:43 PM

## 2023-06-14 NOTE — PATIENT INSTRUCTIONS
-Increase Seroquel to 200 mg at bedtime for mood and voices.   -Continue all other medication regimen. If you tend to miss bedtime medication, start taking Lamictal 50 mg daily, not 25 mg 2 times a day.    -Complete EKG on 6/16 or at least before next appt.    Your next appointment is scheduled on 6/22/2023 (Thu) at 1pm.      **For crisis resources, please see the information at the end of this document**   Patient Education    Thank you for coming to the [facility] [place] [facility].     Lab Testing:  If you had lab testing today and your results are reassuring or normal they will be mailed to you or sent through [employer] within 7 days. If the lab tests need quick action we will call you with the results. The phone number we will call with results is # [phone]. If this is not the best number please call our clinic and change the number.     Medication Refills:  If you need any refills please call your pharmacy and they will contact us. Our fax number for refills is [phone].   Three business days of notice are needed for general medication refill requests.   Five business days of notice are needed for controlled substance refill requests.   If you need to change to a different pharmacy, please contact the new pharmacy directly. The new pharmacy will help you get your medications transferred.     Contact Us:  Please call [phone] during business hours (8-5:00 M-F).   If you have medication related questions after clinic hours, or on the weekend, please call [phone].     Financial Assistance [phone]   Medical Records [phone]       MENTAL HEALTH CRISIS RESOURCES:  For a emergency help, please call 911 or go to the nearest Emergency Department.     Emergency Walk-In Options:   [facility] Unit @ [facility] ([place]): [phone] - Specialized mental health emergency area designed to be calming  [facility] ([place]):  [phone]  [facility] [place] [facility] ([place]): [phone]  [facility] ([place]): [phone]    [facility] Crisis Information:   [place]: [phone]  [place]: [phone]  [place] ([place]) - Adult: [phone]     Child: [phone]  [place] - Adult: [phone]     Child: [phone]  [place]: [phone]  List of all [facility] resources:   https://[place].gov/dhs/people-we-serve/adults/health-care/mental-health/resources/crisis-contacts.jsp    National Crisis Information:   Crisis Text Line: Text  [place]  to 605136  Suicide & Crisis Lifeline: 988  National Suicide Prevention Lifeline: 2-133-108-TALK (1-569.394.2951)       For online chat options, visit https://suicidepreventionlifeline.org/chat/  Poison Control Center: 1-154.528.3995  Trans Lifeline: 5-248-132-9230 - Hotline for transgender people of all ages  [employer]: 7-934-878-5417 - Hotline for LGBT youth     For Non-Emergency Support:   Fast Tracker: Mental Health & Substance Use Disorder Resources -   https://www.[employer].org/
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
## 2023-06-15 ENCOUNTER — MYC MEDICAL ADVICE (OUTPATIENT)
Dept: PSYCHIATRY | Facility: CLINIC | Age: 33
End: 2023-06-15
Payer: MEDICARE

## 2023-06-16 ENCOUNTER — DOCUMENTATION ONLY (OUTPATIENT)
Dept: PSYCHIATRY | Facility: CLINIC | Age: 33
End: 2023-06-16

## 2023-06-16 ENCOUNTER — TELEPHONE (OUTPATIENT)
Dept: PSYCHIATRY | Facility: CLINIC | Age: 33
End: 2023-06-16

## 2023-06-16 DIAGNOSIS — Z79.899 MEDICATION MANAGEMENT: ICD-10-CM

## 2023-06-16 LAB
ATRIAL RATE - MUSE: 102 BPM
DIASTOLIC BLOOD PRESSURE - MUSE: NORMAL MMHG
INTERPRETATION ECG - MUSE: NORMAL
P AXIS - MUSE: 52 DEGREES
PR INTERVAL - MUSE: 154 MS
QRS DURATION - MUSE: 94 MS
QT - MUSE: 358 MS
QTC - MUSE: 466 MS
R AXIS - MUSE: 1 DEGREES
SYSTOLIC BLOOD PRESSURE - MUSE: NORMAL MMHG
T AXIS - MUSE: 29 DEGREES
VENTRICULAR RATE- MUSE: 102 BPM

## 2023-06-16 PROCEDURE — 93010 ELECTROCARDIOGRAM REPORT: CPT | Performed by: INTERNAL MEDICINE

## 2023-06-16 NOTE — TELEPHONE ENCOUNTER
AVS from 5/24/23-6/9/23 inpatient hospitalization faxed to  with [facility], Cristy Mcgee, at [phone].    Emily Gibbs RN on 6/16/2023 at 2:04 PM

## 2023-06-16 NOTE — TELEPHONE ENCOUNTER
----- Message from BROOKLYN Calvin CNP sent at 6/16/2023 11:57 AM CDT -----  Regarding: this is pt cm requested discharge plan  digna Scott, [facility] ([phone], fax ) [email or web address]     Thank you

## 2023-06-16 NOTE — PROGRESS NOTES
Phone conversation with pt's CM, Cristy Mcgee. CM requested inpatient discharge plan to be sent to her (fax: [phone]) as she did not have discharge plan. Discussed inpatient recommendation for day treatment and when pt last seen on 6/14, this was not made, but strongly recommended day treatment and have [facility] and therapist scheduled adjusted. Pt is now scheduled for intake on day treatment on 6/28 11am in person visit. Also discussed about need for EKG which is scheduled for today. CM will contact  to see if they can help with transportation. Discussed concern for current living arrangement, CM is aware of this and just got hold of [facility] manager to start looking for different housing. CM has some ideas on new living arrangement.  CM mentioned that [facility] owner noted that he becomes aggressive when he is not allowed certain medication administration on his own.  Delegated nursing staff to send inpatient discharge plan to her. Regine Last, NELLY, 6/16/2023

## 2023-06-19 ENCOUNTER — DOCUMENTATION ONLY (OUTPATIENT)
Dept: BEHAVIORAL HEALTH | Facility: CLINIC | Age: 33
End: 2023-06-19
Payer: MEDICARE

## 2023-06-22 ENCOUNTER — VIRTUAL VISIT (OUTPATIENT)
Dept: PSYCHIATRY | Facility: CLINIC | Age: 33
End: 2023-06-22
Attending: NURSE PRACTITIONER
Payer: MEDICARE

## 2023-06-22 DIAGNOSIS — F90.2 ATTENTION DEFICIT HYPERACTIVITY DISORDER (ADHD), COMBINED TYPE: ICD-10-CM

## 2023-06-22 DIAGNOSIS — F25.0 SCHIZOAFFECTIVE DISORDER, BIPOLAR TYPE (H): Primary | ICD-10-CM

## 2023-06-22 DIAGNOSIS — F51.05 INSOMNIA DUE TO OTHER MENTAL DISORDER: ICD-10-CM

## 2023-06-22 DIAGNOSIS — F99 INSOMNIA DUE TO OTHER MENTAL DISORDER: ICD-10-CM

## 2023-06-22 DIAGNOSIS — M54.42 CHRONIC LEFT-SIDED LOW BACK PAIN WITH LEFT-SIDED SCIATICA: ICD-10-CM

## 2023-06-22 DIAGNOSIS — M51.369 DDD (DEGENERATIVE DISC DISEASE), LUMBAR: ICD-10-CM

## 2023-06-22 DIAGNOSIS — G89.29 CHRONIC LEFT-SIDED LOW BACK PAIN WITH LEFT-SIDED SCIATICA: ICD-10-CM

## 2023-06-22 DIAGNOSIS — F25.9 SCHIZOAFFECTIVE DISORDER, CHRONIC CONDITION WITH ACUTE EXACERBATION (H): ICD-10-CM

## 2023-06-22 PROCEDURE — 99214 OFFICE O/P EST MOD 30 MIN: CPT | Mod: VID | Performed by: NURSE PRACTITIONER

## 2023-06-22 RX ORDER — PRAZOSIN HYDROCHLORIDE 1 MG/1
1 CAPSULE ORAL AT BEDTIME
Qty: 30 CAPSULE | Refills: 2 | Status: SHIPPED | OUTPATIENT
Start: 2023-06-22 | End: 2023-07-11

## 2023-06-22 RX ORDER — OLANZAPINE 15 MG/1
15 TABLET ORAL AT BEDTIME
Qty: 30 TABLET | Refills: 1 | Status: SHIPPED | OUTPATIENT
Start: 2023-06-22 | End: 2023-07-11 | Stop reason: DRUGHIGH

## 2023-06-22 RX ORDER — LAMOTRIGINE 25 MG/1
25 TABLET ORAL 2 TIMES DAILY
Qty: 60 TABLET | Refills: 1 | Status: SHIPPED | OUTPATIENT
Start: 2023-06-22 | End: 2023-07-11

## 2023-06-22 RX ORDER — CLONAZEPAM 0.5 MG/1
0.5 TABLET ORAL AT BEDTIME
Qty: 30 TABLET | Refills: 1 | Status: SHIPPED | OUTPATIENT
Start: 2023-07-06 | End: 2023-07-11

## 2023-06-22 RX ORDER — BUSPIRONE HYDROCHLORIDE 10 MG/1
10 TABLET ORAL 3 TIMES DAILY
Qty: 90 TABLET | Refills: 2 | Status: ON HOLD | OUTPATIENT
Start: 2023-06-22 | End: 2023-08-28

## 2023-06-22 RX ORDER — DEXTROAMPHETAMINE SACCHARATE, AMPHETAMINE ASPARTATE MONOHYDRATE, DEXTROAMPHETAMINE SULFATE AND AMPHETAMINE SULFATE 5; 5; 5; 5 MG/1; MG/1; MG/1; MG/1
20 CAPSULE, EXTENDED RELEASE ORAL DAILY
Qty: 30 CAPSULE | Refills: 0 | Status: SHIPPED | OUTPATIENT
Start: 2023-07-06 | End: 2023-07-11 | Stop reason: DRUGHIGH

## 2023-06-22 ASSESSMENT — ANXIETY QUESTIONNAIRES
GAD7 TOTAL SCORE: 7
6. BECOMING EASILY ANNOYED OR IRRITABLE: NOT AT ALL
8. IF YOU CHECKED OFF ANY PROBLEMS, HOW DIFFICULT HAVE THESE MADE IT FOR YOU TO DO YOUR WORK, TAKE CARE OF THINGS AT HOME, OR GET ALONG WITH OTHER PEOPLE?: SOMEWHAT DIFFICULT
7. FEELING AFRAID AS IF SOMETHING AWFUL MIGHT HAPPEN: NOT AT ALL
7. FEELING AFRAID AS IF SOMETHING AWFUL MIGHT HAPPEN: NOT AT ALL
5. BEING SO RESTLESS THAT IT IS HARD TO SIT STILL: SEVERAL DAYS
4. TROUBLE RELAXING: NOT AT ALL
IF YOU CHECKED OFF ANY PROBLEMS ON THIS QUESTIONNAIRE, HOW DIFFICULT HAVE THESE PROBLEMS MADE IT FOR YOU TO DO YOUR WORK, TAKE CARE OF THINGS AT HOME, OR GET ALONG WITH OTHER PEOPLE: SOMEWHAT DIFFICULT
1. FEELING NERVOUS, ANXIOUS, OR ON EDGE: MORE THAN HALF THE DAYS
2. NOT BEING ABLE TO STOP OR CONTROL WORRYING: MORE THAN HALF THE DAYS
GAD7 TOTAL SCORE: 7
GAD7 TOTAL SCORE: 7
3. WORRYING TOO MUCH ABOUT DIFFERENT THINGS: MORE THAN HALF THE DAYS

## 2023-06-22 NOTE — PATIENT INSTRUCTIONS
-Continue on current medication regimen.    Your next appointment is scheduled on 7/11/2023 (Tue) at 2:30pm.      **For crisis resources, please see the information at the end of this document**   Patient Education    Thank you for coming to the [facility] [place] [facility].     Lab Testing:  If you had lab testing today and your results are reassuring or normal they will be mailed to you or sent through Swing[employer]ot within 7 days. If the lab tests need quick action we will call you with the results. The phone number we will call with results is # [phone]. If this is not the best number please call our clinic and change the number.     Medication Refills:  If you need any refills please call your pharmacy and they will contact us. Our fax number for refills is [phone].   Three business days of notice are needed for general medication refill requests.   Five business days of notice are needed for controlled substance refill requests.   If you need to change to a different pharmacy, please contact the new pharmacy directly. The new pharmacy will help you get your medications transferred.     Contact Us:  Please call [phone] during business hours (8-5:00 M-F).   If you have medication related questions after clinic hours, or on the weekend, please call [phone].     Financial Assistance [phone]   Medical Records [phone]       MENTAL HEALTH CRISIS RESOURCES:  For a emergency help, please call 911 or go to the nearest Emergency Department.     Emergency Walk-In Options:   [facility] @ [facility] ([place]): [phone] - Specialized mental health emergency area designed to be calming  [facility] ([place]): [phone]  [facility] ([place]): [phone]  [facility]): [phone]    [facility] Crisis Information:   [place]: [phone]  [place]: [phone]  [place] ([place]) - Adult:  [phone]     Child: [phone]  [place] - Adult: [phone]     Child: [phone]  [place]: [phone]  List of all [facility] resources:   https://[place].gov/dhs/people-we-serve/adults/health-care/mental-health/resources/crisis-contacts.jsp    National Crisis Information:   Crisis Text Line: Text  [place]  to 851419  Suicide & Crisis Lifeline: 988  National Suicide Prevention Lifeline: 1-792-529-TALK (1-210.124.5822)       For online chat options, visit https://suicidepreventionlifeline.org/chat/  Poison Control Center: 1-379.655.3471  Trans Lifeline: 1-689.653.1309 - Hotline for transgender people of all ages  The [employer] Project: 9-216-297-7864 - Hotline for LGBT youth     For Non-Emergency Support:   Fast Tracker: Mental Health & Substance Use Disorder Resources -   https://www.NetechyckExpenseBotn.org/

## 2023-06-22 NOTE — NURSING NOTE
Is the patient currently in the state of [place]? YES    Visit mode:VIDEO    If the visit is dropped, the patient can be reconnected by: VIDEO VISIT: Text to cell phone: [phone]    Will anyone else be joining the visit? NO      How would you like to obtain your AVS? MyChart    Are changes needed to the allergy or medication list? NO    Reason for visit: RECHECK    PHQ not complete per department protocol.    SARAH Celaya on 6/22/2023 at 12:42 PM
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
## 2023-06-23 ENCOUNTER — TELEPHONE (OUTPATIENT)
Dept: BEHAVIORAL HEALTH | Facility: CLINIC | Age: 33
End: 2023-06-23
Payer: MEDICARE

## 2023-06-23 ENCOUNTER — TELEPHONE (OUTPATIENT)
Dept: PSYCHIATRY | Facility: CLINIC | Age: 33
End: 2023-06-23
Payer: MEDICARE

## 2023-06-23 NOTE — TELEPHONE ENCOUNTER
[facility] Call Center    Phone Message    May a detailed message be left on voicemail: yes     Reason for Call: Other: Pt requested to speak with Lucie to discuss IOP.      Action Taken: Other: [facility] psych pool    Travel Screening: Not Applicable

## 2023-06-23 NOTE — TELEPHONE ENCOUNTER
Patient called and was not sure what was happening with day treatment. He thought that he was able to start - he did intake while IP and they called to set up another intake. RN called and spoke to PA at [phone] and discussed and she is unsure why he meeds another intake. She is going to reach out to the  and  and see and they will call and update the patient. If patient doesn nto hear back they will attend the new intake appointment.

## 2023-07-03 NOTE — PHARMACY-ADMISSION MEDICATION HISTORY
Admission Medication History Completed by Pharmacy    Sources used to verify prior to admission medications:   - Patient interview  - Prescription directions and fill records from [facility] [place], [place] - [address] [facility]   --> all refills up to date     Medication Adherence: Poor, patient reports missing several doses per week     [place]  Report:  1) 10/21: gabapentin 300mg capsules, qty #90, 15 days   2) 10/17: lorazepam 1mg tablets, qty #30, 15 days    Pertinent Changes Made to PTA Med List:  Added: trazodone prn (based on pharmacy fill records)     Removed:  1) amino acids supplement - patient reports no longer taking  2) naproxen 500mg PO BID x 2 weeks, then BID PRN pain thereafter --> prescribed 10/9/2019 but patient reports never starting because she never followed up with pain clinic     Prior to Admission Medication List:  Prior to Admission Medications   Prescriptions Last Dose Informant     LORazepam (ATIVAN) 1 MG tablet Past Week Pharmacy     Sig: Take 1 tablet (1 mg) by mouth 2 times daily as needed for anxiety To be administered by wife Sergio   fluPHENAZine (PROLIXIN) 1 MG tablet Past Week Pharmacy     Sig: Take 1 tablet (1 mg) by mouth 2 times daily   gabapentin (NEURONTIN) 300 MG capsule Past Week Pharmacy     Sig: Take 2 capsules (600 mg) by mouth 3 times daily   lithium ER (LITHOBID/ESKALITH CR) 300 MG CR tablet Past Week Pharmacy     Sig: Take 1 tab, 300 mg, in the a.m. and 2 tabs, 600 mg in the p.m.   traZODone (DESYREL) 50 MG tablet Past Week Pharmacy     Sig: Take 50 mg by mouth nightly as needed for sleep           Time spent: 30 minutes  -----------  Ashlyn Manning, Pharm.D., BCPP  Behavioral Health Pharmacist  [facility] ([facility])  Ascom: *[phone] or [phone]        
knee/Left:

## 2023-07-11 ENCOUNTER — VIRTUAL VISIT (OUTPATIENT)
Dept: PSYCHIATRY | Facility: CLINIC | Age: 33
End: 2023-07-11
Attending: NURSE PRACTITIONER
Payer: MEDICARE

## 2023-07-11 DIAGNOSIS — F90.2 ATTENTION DEFICIT HYPERACTIVITY DISORDER (ADHD), COMBINED TYPE: Primary | ICD-10-CM

## 2023-07-11 DIAGNOSIS — F41.9 ANXIETY: ICD-10-CM

## 2023-07-11 DIAGNOSIS — F25.9 SCHIZOAFFECTIVE DISORDER, CHRONIC CONDITION WITH ACUTE EXACERBATION (H): ICD-10-CM

## 2023-07-11 DIAGNOSIS — F25.0 SCHIZOAFFECTIVE DISORDER, BIPOLAR TYPE (H): ICD-10-CM

## 2023-07-11 PROCEDURE — 99214 OFFICE O/P EST MOD 30 MIN: CPT | Mod: VID | Performed by: NURSE PRACTITIONER

## 2023-07-11 RX ORDER — CLONAZEPAM 0.5 MG/1
0.5 TABLET ORAL AT BEDTIME
Qty: 30 TABLET | Refills: 1 | Status: SHIPPED | DISCHARGE
Start: 2023-07-11 | End: 2023-07-11

## 2023-07-11 RX ORDER — LAMOTRIGINE 25 MG/1
25 TABLET ORAL DAILY
Qty: 30 TABLET | Refills: 1 | Status: SHIPPED | OUTPATIENT
Start: 2023-07-11 | End: 2023-08-01

## 2023-07-11 RX ORDER — LAMOTRIGINE 25 MG/1
25 TABLET ORAL DAILY
Qty: 30 TABLET | Refills: 1 | Status: SHIPPED | OUTPATIENT
Start: 2023-07-11 | End: 2023-07-11

## 2023-07-11 RX ORDER — CLONIDINE HYDROCHLORIDE 0.1 MG/1
0.1 TABLET ORAL DAILY PRN
Qty: 30 TABLET | Refills: 1 | Status: SHIPPED | OUTPATIENT
Start: 2023-07-11 | End: 2023-08-01

## 2023-07-11 RX ORDER — CLONAZEPAM 0.5 MG/1
0.5 TABLET ORAL
Qty: 30 TABLET | Refills: 1 | Status: SHIPPED | DISCHARGE
Start: 2023-07-11 | End: 2023-08-01

## 2023-07-11 RX ORDER — OLANZAPINE 10 MG/1
10 TABLET ORAL AT BEDTIME
Qty: 30 TABLET | Refills: 1 | Status: SHIPPED | OUTPATIENT
Start: 2023-07-11 | End: 2023-07-11

## 2023-07-11 RX ORDER — QUETIAPINE FUMARATE 200 MG/1
200 TABLET, FILM COATED ORAL DAILY
Qty: 30 TABLET | Refills: 1 | Status: SHIPPED | OUTPATIENT
Start: 2023-07-11 | End: 2023-07-11

## 2023-07-11 RX ORDER — OLANZAPINE 10 MG/1
10 TABLET ORAL AT BEDTIME
Qty: 30 TABLET | Refills: 1 | Status: SHIPPED | OUTPATIENT
Start: 2023-07-11 | End: 2023-07-19 | Stop reason: DRUGHIGH

## 2023-07-11 RX ORDER — QUETIAPINE FUMARATE 200 MG/1
200 TABLET, FILM COATED ORAL DAILY
Qty: 30 TABLET | Refills: 1 | Status: SHIPPED | OUTPATIENT
Start: 2023-07-11 | End: 2023-08-01

## 2023-07-11 RX ORDER — DEXTROAMPHETAMINE SACCHARATE, AMPHETAMINE ASPARTATE MONOHYDRATE, DEXTROAMPHETAMINE SULFATE AND AMPHETAMINE SULFATE 6.25; 6.25; 6.25; 6.25 MG/1; MG/1; MG/1; MG/1
25 CAPSULE, EXTENDED RELEASE ORAL EVERY MORNING
Qty: 30 CAPSULE | Refills: 0 | Status: SHIPPED | OUTPATIENT
Start: 2023-07-11 | End: 2023-08-01

## 2023-07-11 RX ORDER — CLONIDINE HYDROCHLORIDE 0.1 MG/1
0.1 TABLET ORAL DAILY PRN
Qty: 30 TABLET | Refills: 1 | Status: SHIPPED | OUTPATIENT
Start: 2023-07-11 | End: 2023-07-11

## 2023-07-11 ASSESSMENT — PATIENT HEALTH QUESTIONNAIRE - PHQ9
SUM OF ALL RESPONSES TO PHQ QUESTIONS 1-9: 7
10. IF YOU CHECKED OFF ANY PROBLEMS, HOW DIFFICULT HAVE THESE PROBLEMS MADE IT FOR YOU TO DO YOUR WORK, TAKE CARE OF THINGS AT HOME, OR GET ALONG WITH OTHER PEOPLE: SOMEWHAT DIFFICULT
SUM OF ALL RESPONSES TO PHQ QUESTIONS 1-9: 7

## 2023-07-11 NOTE — PROGRESS NOTES
"Virtual Visit Details    Type of service:  Video Visit   Video Start Time: {video visit start/end time for provider to select:758033}  Video End Time:{video visit start/end time for provider to select:577759}    Originating Location (pt. Location): {video visit patient location:630528::\"Home\"}  {PROVIDER LOCATION On-site should be selected for visits conducted from your clinic location or adjoining [facility] hospital, academic office, or other nearby [facility] building. Off-site should be selected for all other provider locations, including home:494151}  Distant Location (provider location):  {virtual location provider:643627}  Platform used for Video Visit: {Virtual Visit Platforms:218666::\"[employer]"}  "

## 2023-07-11 NOTE — NURSING NOTE
Is the patient currently in the state of [place]? YES    Visit mode:VIDEO    If the visit is dropped, the patient can be reconnected by: VIDEO VISIT: Text to cell phone: [phone]    Will anyone else be joining the visit? NO      How would you like to obtain your AVS? MyChart    Are changes needed to the allergy or medication list? NO    Reason for visit: RECHECK
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
## 2023-07-11 NOTE — PATIENT INSTRUCTIONS
-Prazosin is discontinued. Monitor changes in nightmares.  -Increase Adderall XR to 25 mg daily for ADHD. Please monitor blood pressure 2 times a week and report them back to mattie. If this is consistently over 140/90, recommend following up with primary care.  -Decrease Zyprexa to 10 mg daily. Monitor changes in psychosis, paranoia, sleep and mood.  -Lamictal is decreased to 25 mg daily. Please do not adjust the dose on your own.  -Klonopin is now changed to bedtime as needed. If you are able to sleep or not anxious, do not take Klonopin.  -Continue all other medication regimen for now.    Your next appointment is scheduled on 8/8/2023 (Tue) at 3pm.    Thank you for coming to the [facility] MENTAL HEALTH & ADDICTION [place] [facility].    Lab Testing:  If you had lab testing today and your results are reassuring or normal they will be mailed to you or sent through [employer] within 7 days. If the lab tests need quick action we will call you with the results. The phone number we will call with results is # [phone] (home) . If this is not the best number please call our clinic and change the number.    Medication Refills:  If you need any refills please call your pharmacy and they will contact us. Our fax number for refills is [phone]. Please allow three business for refill processing. If you need to  your refill at a new pharmacy, please contact the new pharmacy directly. The new pharmacy will help you get your medications transferred.     Scheduling:  If you have any concerns about today's visit or wish to schedule another appointment please call our office during normal business hours [phone] (8-5:00 M-F)    Contact Us:  Please call [phone] during business hours (8-5:00 M-F).  If after clinic hours, or on the weekend, please call  [phone].    Financial Assistance [phone]  [place] Billing [phone]  [place] Billing [facility], [facility]: [phone]  [place] [facility]  [phone]  Medical Records [phone]      MENTAL HEALTH CRISIS NUMBERS:  For a medical emergency please call  911 or go to the nearest ER.     [facility]:   [facility] -[phone]   Crisis Residence [facility] -[phone]   Walk-In Counseling Center [facility] -[phone]   COPE 24/7 [place] Mobile Team -[phone] (adults)/[phone] (child)  CHILD: [facility] needs assessment team - [phone]      [facility]:   [facility] - [phone]   Walk-in counseling [facility] - [phone]   Walk-in counseling [facility] - [phone]   Crisis Residence [facility] - [phone]  Urgent Care Adult Mental [facility]-[phone] mobile unit/ 24/7 crisis line    National Crisis Numbers:   National Suicide Prevention Lifeline: 5-382-936-TALK (961-274-3076)  Poison Control Center - 3-023-927-4489  [employer]/resources for a list of additional resources (SOS)  Trans Lifeline a hotline for transgender people 1-555.182.4362  The Manuel Project a hotline for LGBT youth 4-181-857-9115  Crisis Text Line: For any crisis 24/7   To: 316285  see www.crisistextline.org  - IF MAKING A CALL FEELS TOO HARD, send a text!         Again thank you for choosing [facility] MENTAL HEALTH & ADDICTION [facility] and please let us know how we can best partner with you to improve you and your family's health.    You may be receiving a survey regarding this appointment. We would love to have your feedback, both positive and negative. The survey is done by an external company, so your answers are anonymous.

## 2023-07-19 ENCOUNTER — VIRTUAL VISIT (OUTPATIENT)
Dept: PSYCHIATRY | Facility: CLINIC | Age: 33
End: 2023-07-19
Attending: NURSE PRACTITIONER
Payer: MEDICARE

## 2023-07-19 DIAGNOSIS — F25.9 SCHIZOAFFECTIVE DISORDER, CHRONIC CONDITION WITH ACUTE EXACERBATION (H): Primary | ICD-10-CM

## 2023-07-19 DIAGNOSIS — F41.9 ANXIETY: ICD-10-CM

## 2023-07-19 DIAGNOSIS — F90.2 ATTENTION DEFICIT HYPERACTIVITY DISORDER (ADHD), COMBINED TYPE: ICD-10-CM

## 2023-07-19 PROCEDURE — 99214 OFFICE O/P EST MOD 30 MIN: CPT | Mod: VID | Performed by: NURSE PRACTITIONER

## 2023-07-19 RX ORDER — OLANZAPINE 5 MG/1
5 TABLET ORAL AT BEDTIME
Qty: 30 TABLET | Refills: 0 | Status: SHIPPED | OUTPATIENT
Start: 2023-07-19 | End: 2023-08-01

## 2023-07-19 ASSESSMENT — ANXIETY QUESTIONNAIRES
6. BECOMING EASILY ANNOYED OR IRRITABLE: NOT AT ALL
7. FEELING AFRAID AS IF SOMETHING AWFUL MIGHT HAPPEN: NOT AT ALL
GAD7 TOTAL SCORE: 0
3. WORRYING TOO MUCH ABOUT DIFFERENT THINGS: NOT AT ALL
GAD7 TOTAL SCORE: 0
1. FEELING NERVOUS, ANXIOUS, OR ON EDGE: NOT AT ALL
2. NOT BEING ABLE TO STOP OR CONTROL WORRYING: NOT AT ALL
5. BEING SO RESTLESS THAT IT IS HARD TO SIT STILL: NOT AT ALL
IF YOU CHECKED OFF ANY PROBLEMS ON THIS QUESTIONNAIRE, HOW DIFFICULT HAVE THESE PROBLEMS MADE IT FOR YOU TO DO YOUR WORK, TAKE CARE OF THINGS AT HOME, OR GET ALONG WITH OTHER PEOPLE: NOT DIFFICULT AT ALL

## 2023-07-19 ASSESSMENT — PATIENT HEALTH QUESTIONNAIRE - PHQ9
SUM OF ALL RESPONSES TO PHQ QUESTIONS 1-9: 6
5. POOR APPETITE OR OVEREATING: NOT AT ALL

## 2023-07-19 ASSESSMENT — PAIN SCALES - GENERAL: PAINLEVEL: NO PAIN (0)

## 2023-07-19 NOTE — PROGRESS NOTES
"Virtual Visit Details    Type of service:  Video Visit   Video Start Time: {video visit start/end time for provider to select:767199}  Video End Time:{video visit start/end time for provider to select:256436}    Originating Location (pt. Location): {video visit patient location:513752::\"Home\"}  {PROVIDER LOCATION On-site should be selected for visits conducted from your clinic location or adjoining [facility] hospital, academic office, or other nearby [facility] building. Off-site should be selected for all other provider locations, including home:240130}  Distant Location (provider location):  {virtual location provider:882405}  Platform used for Video Visit: {Virtual Visit Platforms:786011::\"Ongo\"}  "

## 2023-07-19 NOTE — NURSING NOTE
Is the patient currently in the state of [place]? YES    Visit mode:VIDEO    If the visit is dropped, the patient can be reconnected by: VIDEO VISIT: Text to cell phone: [phone]    Will anyone else be joining the visit? NO      How would you like to obtain your AVS? MyChart    Are changes needed to the allergy or medication list? NO    Reason for visit: RECHECK

## 2023-07-19 NOTE — PATIENT INSTRUCTIONS
-Decrease Zyprexa to 5 mg at bedtime. Monitor changes in oversedation, paranoia, or psychosis.  -Minimize as needed Klonopin regimen.  -Continue all other medication regimen for now.    Your next appointment is scheduled on 7/26/2023 (Wed) at noon.     **For crisis resources, please see the information at the end of this document**   Patient Education    Thank you for coming to the [facility] MENTAL HEALTH & ADDICTION [place] CL[facility].     Lab Testing:  If you had lab testing today and your results are reassuring or normal they will be mailed to you or sent through [employer] within 7 days. If the lab tests need quick action we will call you with the results. The phone number we will call with results is # [phone]. If this is not the best number please call our clinic and change the number.     Medication Refills:  If you need any refills please call your pharmacy and they will contact us. Our fax number for refills is [phone].   Three business days of notice are needed for general medication refill requests.   Five business days of notice are needed for controlled substance refill requests.   If you need to change to a different pharmacy, please contact the new pharmacy directly. The new pharmacy will help you get your medications transferred.     Contact Us:  Please call [phone] during business hours (8-5:00 M-F).   If you have medication related questions after clinic hours, or on the weekend, please call [phone].     Financial Assistance [phone]   Medical Records [phone]       MENTAL HEALTH CRISIS RESOURCES:  For a emergency help, please call 911 or go to the nearest Emergency Department.     Emergency Walk-In Options:   EmPATH Unit @ [place] [facility] ([place]): [phone] - Specialized mental health emergency area designed to be calming  [facility] ([place]): [phone]  [facility] Acute Psychiat[facility] Services ([place]): [phone]  [facility]  Center ([place]): [phone]    [facility] Crisis Information:   [place]: [phone]  [place]: [phone]  [place] ([place]) - Adult: [phone]     Child: [phone]  [place] - Adult: [phone]     Child: [phone]  [place]: [phone]  List of all [facility] resources:   https://[place].[facility]/dhs/people-we-serve/adults/health-care/mental-health/resources/crisis-contacts.jsp    National Crisis Information:   Crisis Text Line: Text  [place]  to 065323  Suicide & Crisis Lifeline: 988  National Suicide Prevention Lifeline: 9-915-111-BMMU (1-306.163.6395)       For online chat options, visit https://suicidepreventionlifeline.org/chat/  Poison Control Center: 1-144.233.7717  Trans Lifeline: 1-421.463.4589 - Hotline for transgender people of all ages  The Manuel Project: 3-436-794-4562 - Hotline for LGBT youth     For Non-Emergency Support:   Fast Tracker: Mental Health & Substance Use Disorder Resources -   https://www.[employer].org/

## 2023-07-20 ENCOUNTER — PRE VISIT (OUTPATIENT)
Dept: GASTROENTEROLOGY | Facility: CLINIC | Age: 33
End: 2023-07-20
Payer: MEDICARE

## 2023-07-21 ENCOUNTER — OFFICE VISIT (OUTPATIENT)
Dept: FAMILY MEDICINE | Facility: CLINIC | Age: 33
End: 2023-07-21
Payer: MEDICARE

## 2023-07-21 VITALS
DIASTOLIC BLOOD PRESSURE: 87 MMHG | HEIGHT: 66 IN | WEIGHT: 238 LBS | BODY MASS INDEX: 38.25 KG/M2 | OXYGEN SATURATION: 96 % | RESPIRATION RATE: 17 BRPM | HEART RATE: 64 BPM | SYSTOLIC BLOOD PRESSURE: 126 MMHG

## 2023-07-21 DIAGNOSIS — F64.9 GENDER DYSPHORIA: Primary | ICD-10-CM

## 2023-07-21 PROCEDURE — 99214 OFFICE O/P EST MOD 30 MIN: CPT | Mod: GC

## 2023-07-21 RX ORDER — TESTOSTERONE 1.62 MG/G
2 GEL TRANSDERMAL DAILY
Qty: 75 G | Refills: 3 | Status: SHIPPED | OUTPATIENT
Start: 2023-07-21 | End: 2023-10-27

## 2023-07-21 NOTE — PROGRESS NOTES
Preceptor Attestation:   Patient seen, evaluated and discussed with the resident. I have verified the content of the note, which accurately reflects my assessment of the patient and the plan of care.   Supervising Physician:  Juana Peters, DO

## 2023-07-21 NOTE — PROGRESS NOTES
Assessment & Plan     Gender dysphoria  Re-starting testosterone after brief gap (ran out in early June.) Per patient preference, switching to a gel form. Discussed 4-hour dry time and precautions against transfer to other people, pets.   Will plan to follow-up with a return visit in 2 months. Can repeat T level lab at that visit. No labs done today due to recent lab values available.   - testosterone (ANDROGEL 1.62 % PUMP) 20.25 MG/ACT gel; Place 2 Pump onto the skin daily for 30 days Apply from dispenser to clean, dry, intact skin of the upper arms and shoulders.    Diagnosis or treatment significantly limited by social determinants of health - Member of LGBTQ+ community  Prescription drug management      Return in about 2 months (around 9/21/2023) for Testosterone follow-up, with any available provider.    Jailene Singh MD  [facility] LINETTE Randall is a 32 year old, presenting for the following health issues:  Recheck Medication (Testosterone /)        7/21/2023     2:03 PM   Additional Questions   Roomed by monik   Accompanied by self     HPI   Prakash re-started testosterone in January 2023. His dose was 20mg subcutaneous injection weekly. He has had difficulty with follow-up in the past. Claudia's provider had attempted to coordinate gender care-related labs at his PCP office in [place] in February; these labs seem to have been completed in May. Lab results listed below.     Prakash reports satisfactory results since re-starting T. He has noticed new chin and stomach hair.     He ran out of refills in the first week of June and was not able to follow-up sooner. Since then, he has not had return of menstruation or any major changes in symptoms.     He would like to re-start T today, and would prefer not to do shots anymore.     He reports that his mental health has been pretty good. He does not report significant mental health changes when he is on vs off T. Of note, he had a  "psychiatric hospitalization earlier this summer for suicidality.     Has had elevated LFTs and hepatic steatosis noted on recent imaging. Was to establish with GI for nonalcoholic fatty liver disease on 7/20; overslept. Plans to reschedule this.     He reports he is enjoying the summer, going to the beach a lot. He has a dog named ginger who does go to the beach but does not like to swim.       Objective    /87 (BP Location: Left arm, Patient Position: Sitting, Cuff Size: Adult Large)   Pulse 64   Resp 17   Ht 1.676 m (5' 6\")   Wt 108 kg (238 lb)   SpO2 96%   BMI 38.41 kg/m    Body mass index is 38.41 kg/m .  Physical Exam  Vitals reviewed.   Constitutional:       Appearance: Normal appearance.   Cardiovascular:      Rate and Rhythm: Normal rate and regular rhythm.   Pulmonary:      Effort: Pulmonary effort is normal.      Breath sounds: Normal breath sounds.   Neurological:      General: No focal deficit present.      Mental Status: He is alert.   Psychiatric:         Mood and Affect: Mood normal.         Behavior: Behavior normal.          Last testosterone level was 5/5/23, midway between shots: 51.     Last HGB: 15.2  Recent Labs   Lab Test 05/28/23  0756 05/05/23  1320   CHOL 119 152   HDL 35* 42   LDL 36 46   TRIG 240* 322*     Liver Function Studies - Recent Labs   Lab Test 05/28/23  0756   PROTTOTAL 7.0   ALBUMIN 4.1   BILITOTAL 0.2   ALKPHOS 73   AST 76*   ALT 82*       Hemoglobin   Date Value Ref Range Status   05/24/2023 15.2 11.7 - 17.7 g/dL Final     Comment:     Reference Range:                                                     Female 11.7-15.7 g/dL                                      Male 13.3-17.7 g/dL   05/19/2021 13.6 11.7 - 15.7 g/dL Final         "

## 2023-07-21 NOTE — PATIENT INSTRUCTIONS
Patient Education   Here is the plan from today's visit    1. Gender dysphoria  You will re-start testosterone using a gel. Please come back to the clinic in 2 months to discuss how it is going and to get labs re-checked.     - testosterone (ANDROGEL 1.62 % PUMP) 20.25 MG/ACT gel; Place 2 Pump onto the skin daily for 30 days Apply from dispenser to clean, dry, intact skin of the upper arms and shoulders.  Dispense: 75 g; Refill: 3      Follow up plan  Return in about 2 months (around 9/21/2023) for Testosterone follow-up, with any available provider.    Thank you for coming to [facility] today.  Lab Testing:  **If you had lab testing today and your results are reassuring or normal they will be mailed to you or sent through [employer] within 7 days.   **If the lab tests need quick action we will call you with the results.  **If you are having labs done on a different day, please call [phone] to schedule at [facility] or [phone] for other [facility] Outpatient Lab locations. Labs do not offer walk-in appointments.  The phone number we will call with results is # [phone] (home) . If this is not the best number please call our clinic and change the number.  Medication Refills:  If you need any refills please call your pharmacy and they will contact us.   If you need to  your refill at a new pharmacy, please contact the new pharmacy directly. The new pharmacy will help you get your medications transferred faster.   Scheduling:  If you have any concerns about today's visit or wish to schedule another appointment please call our office during normal business hours [phone] (8-5:00 M-F). If you can no longer make a scheduled visit, please cancel via [employer] or call us to cancel.   If a referral was made to an [facility] specialty provider and you do not get a call from central scheduling, please refer to directions on your visit summary or call our office during normal business  hours for assistance.   If a Mammogram was ordered for you at the Breast Center call [phone] to schedule or change your appointment.  If you had an XRay/CT/Ultrasound/MRI ordered the number is [phone] to schedule or change your radiology appointment.   [facility] has limited ultrasound appointments available on Wednesdays, if you would like your ultrasound at [facility], please call [phone] to schedule.   Medical Concerns:  If you have urgent medical concerns please call [phone] at any time of the day.    Jailene Singh MD

## 2023-07-26 ENCOUNTER — DOCUMENTATION ONLY (OUTPATIENT)
Dept: PSYCHIATRY | Facility: CLINIC | Age: 33
End: 2023-07-26

## 2023-08-01 ENCOUNTER — VIRTUAL VISIT (OUTPATIENT)
Dept: PSYCHIATRY | Facility: CLINIC | Age: 33
End: 2023-08-01
Attending: NURSE PRACTITIONER
Payer: MEDICARE

## 2023-08-01 DIAGNOSIS — F41.9 ANXIETY: ICD-10-CM

## 2023-08-01 DIAGNOSIS — F90.2 ATTENTION DEFICIT HYPERACTIVITY DISORDER (ADHD), COMBINED TYPE: ICD-10-CM

## 2023-08-01 DIAGNOSIS — F25.0 SCHIZOAFFECTIVE DISORDER, BIPOLAR TYPE (H): Primary | ICD-10-CM

## 2023-08-01 PROCEDURE — 99214 OFFICE O/P EST MOD 30 MIN: CPT | Mod: VID | Performed by: NURSE PRACTITIONER

## 2023-08-01 RX ORDER — DEXTROAMPHETAMINE SACCHARATE, AMPHETAMINE ASPARTATE MONOHYDRATE, DEXTROAMPHETAMINE SULFATE AND AMPHETAMINE SULFATE 6.25; 6.25; 6.25; 6.25 MG/1; MG/1; MG/1; MG/1
25 CAPSULE, EXTENDED RELEASE ORAL DAILY
Qty: 30 CAPSULE | Refills: 0 | Status: ON HOLD | OUTPATIENT
Start: 2023-09-07 | End: 2023-08-28

## 2023-08-01 RX ORDER — QUETIAPINE FUMARATE 200 MG/1
200 TABLET, FILM COATED ORAL DAILY
Qty: 30 TABLET | Refills: 1 | Status: ON HOLD | OUTPATIENT
Start: 2023-08-01 | End: 2023-08-28

## 2023-08-01 RX ORDER — LAMOTRIGINE 25 MG/1
25 TABLET ORAL DAILY
Qty: 30 TABLET | Refills: 1 | Status: SHIPPED | OUTPATIENT
Start: 2023-08-01 | End: 2023-08-10

## 2023-08-01 RX ORDER — CLONIDINE HYDROCHLORIDE 0.1 MG/1
0.1 TABLET ORAL DAILY PRN
Qty: 30 TABLET | Refills: 1 | Status: SHIPPED | OUTPATIENT
Start: 2023-08-01 | End: 2023-08-10

## 2023-08-01 RX ORDER — DEXTROAMPHETAMINE SACCHARATE, AMPHETAMINE ASPARTATE MONOHYDRATE, DEXTROAMPHETAMINE SULFATE AND AMPHETAMINE SULFATE 6.25; 6.25; 6.25; 6.25 MG/1; MG/1; MG/1; MG/1
25 CAPSULE, EXTENDED RELEASE ORAL DAILY
Qty: 30 CAPSULE | Refills: 0 | Status: SHIPPED | OUTPATIENT
Start: 2023-08-08 | End: 2023-08-07

## 2023-08-01 RX ORDER — DEXTROAMPHETAMINE SACCHARATE, AMPHETAMINE ASPARTATE MONOHYDRATE, DEXTROAMPHETAMINE SULFATE AND AMPHETAMINE SULFATE 6.25; 6.25; 6.25; 6.25 MG/1; MG/1; MG/1; MG/1
25 CAPSULE, EXTENDED RELEASE ORAL DAILY
Qty: 30 CAPSULE | Refills: 0 | Status: ON HOLD | OUTPATIENT
Start: 2023-10-07 | End: 2023-08-28

## 2023-08-01 NOTE — PROGRESS NOTES
"Virtual Visit Details    Type of service:  Video Visit   Video Start Time: {video visit start/end time for provider to select:068003}  Video End Time:{video visit start/end time for provider to select:437144}    Originating Location (pt. Location): {video visit patient location:006136::\"Home\"}  {PROVIDER LOCATION On-site should be selected for visits conducted from your clinic location or adjoining [facility] hospital, academic office, or other nearby [facility] building. Off-site should be selected for all other provider locations, including home:781159}  Distant Location (provider location):  {virtual location provider:161185}  Platform used for Video Visit: {Virtual Visit Platforms:672356::\"NativeX\"}    "
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
## 2023-08-01 NOTE — PATIENT INSTRUCTIONS
-Discontinue Klonopin.  -Discontinue Zyprexa 5 mg.  Monitor changes in sleep, psychosis, paranoia, anxiety and mood.  -Continue all other medication regimen.    Your next appointment is scheduled on 8/17/2023 (Thu) at 1pm.      **For crisis resources, please see the information at the end of this document**   Patient Education    Thank you for coming to the [facility] [place] [facility].     Lab Testing:  If you had lab testing today and your results are reassuring or normal they will be mailed to you or sent through [employer] within 7 days. If the lab tests need quick action we will call you with the results. The phone number we will call with results is # [phone]. If this is not the best number please call our clinic and change the number.     Medication Refills:  If you need any refills please call your pharmacy and they will contact us. Our fax number for refills is [phone].   Three business days of notice are needed for general medication refill requests.   Five business days of notice are needed for controlled substance refill requests.   If you need to change to a different pharmacy, please contact the new pharmacy directly. The new pharmacy will help you get your medications transferred.     Contact Us:  Please call [phone] during business hours (8-5:00 M-F).   If you have medication related questions after clinic hours, or on the weekend, please call [phone].     Financial Assistance [phone]   Medical Records [phone]       MENTAL HEALTH CRISIS RESOURCES:  For a emergency help, please call 911 or go to the nearest Emergency Department.     Emergency Walk-In Options:   EmPATH Unit @ [facility] ([place]): [phone] - Specialized mental health emergency area designed to be calming  [facility] ([place]): [phone]  [facility] ([place]): [phone]  [facility] ([place]):  [phone]    [facility] Crisis Information:   [place]: [phone]  [place]: [phone]  [place] ([place]) - Adult: [phone]     Child: [phone]  [place] - Adult: [phone]     Child: [phone]  [place]: [phone]  List of all [facility] resources:   https://[place].gov/dhs/people-we-serve/adults/health-care/mental-health/resources/crisis-contacts.jsp    National Crisis Information:   Crisis Text Line: Text  [place]  to 201725  Suicide & Crisis Lifeline: 988  National Suicide Prevention Lifeline: 2-047-746-TALK (1-588.672.8408)       For online chat options, visit https://suicidepreventionlifeline.org/chat/  Poison Control Center: 1-426.685.9266  Trans Lifeline: 1-817.787.3230 - Hotline for transgender people of all ages  The [employer]: 7-735-738-6966 - Hotline for LGBT youth     For Non-Emergency Support:   Fast Tracker: Mental Health & Substance Use Disorder Resources -   https://www.[employer].org/

## 2023-08-01 NOTE — NURSING NOTE
Is the patient currently in the state of [place]? YES    Visit mode:VIDEO    If the visit is dropped, the patient can be reconnected by: VIDEO VISIT: Text to cell phone: [phone]    Will anyone else be joining the visit? NO      How would you like to obtain your AVS? MyChart    Are changes needed to the allergy or medication list? NO    Reason for visit: RECHECK

## 2023-08-07 ENCOUNTER — TELEPHONE (OUTPATIENT)
Dept: FAMILY MEDICINE | Facility: CLINIC | Age: 33
End: 2023-08-07

## 2023-08-07 ENCOUNTER — E-VISIT (OUTPATIENT)
Dept: FAMILY MEDICINE | Facility: CLINIC | Age: 33
End: 2023-08-07
Payer: MEDICARE

## 2023-08-07 DIAGNOSIS — E11.65 TYPE 2 DIABETES MELLITUS WITH HYPERGLYCEMIA, WITHOUT LONG-TERM CURRENT USE OF INSULIN (H): Primary | ICD-10-CM

## 2023-08-07 DIAGNOSIS — F90.2 ATTENTION DEFICIT HYPERACTIVITY DISORDER (ADHD), COMBINED TYPE: ICD-10-CM

## 2023-08-07 PROCEDURE — 99421 OL DIG E/M SVC 5-10 MIN: CPT | Performed by: NURSE PRACTITIONER

## 2023-08-07 RX ORDER — DEXTROAMPHETAMINE SACCHARATE, AMPHETAMINE ASPARTATE MONOHYDRATE, DEXTROAMPHETAMINE SULFATE AND AMPHETAMINE SULFATE 6.25; 6.25; 6.25; 6.25 MG/1; MG/1; MG/1; MG/1
25 CAPSULE, EXTENDED RELEASE ORAL DAILY
Qty: 30 CAPSULE | Refills: 0 | Status: ON HOLD | OUTPATIENT
Start: 2023-08-08 | End: 2023-08-28

## 2023-08-07 NOTE — TELEPHONE ENCOUNTER
Writer called patient's pharmacy to ensure that previous 8/8 prescription had been canceled and they confirmed that it had.

## 2023-08-07 NOTE — TELEPHONE ENCOUNTER
Reviewed BHAVANI Clark RN's most recent addendum to this thread. Will sign 30 day rx supply of amphetamine-dextroamphetamine XR 25mg, #30, sig 1 cap po qday, earliest fill date 8/7/2023, starting date 8/8/2023 - I additionally marked the box that I am not the prescriber for future refills, and that BROOKLYN Taylor, CNP, is the prescriber for future refills.    Deepak Marinelli MD  Psychiatry POD

## 2023-08-07 NOTE — TELEPHONE ENCOUNTER
[facility] Call Center    Phone Message    May a detailed message be left on voicemail: yes     Reason for Call: Medication Refill Request    Has the patient contacted the pharmacy for the refill? Yes   Name of medication being requested: adderall  Provider who prescribed the medication: mattie arrieta  Pharmacy:    [facility] [address]    Date medication is needed: patient is going out of town today and requests authorization to pharmacy to fill medication early today.    Action Taken: Message routed to:  Other: nursing pool    Travel Screening: Not Applicable

## 2023-08-07 NOTE — TELEPHONE ENCOUNTER
Writer called patient's pharmacy to authorize early refill of patient's Adderall today 8/7, per Dr. Marinelli. Pharmacist unsure why, but system would not let them override the date of 8/8 and suggested current prescription be discontinued and new prescription be sent in with fill date 8/7.

## 2023-08-07 NOTE — TELEPHONE ENCOUNTER
[facility] phone call message- medication clarification/question:    Full Medication Name: testosterone (ANDROGEL 1.62 % PUMP) 20.25 MG/ACT gel   Dose: Place 2 Pump onto the skin daily for 30 days Apply from dispenser to clean, dry, intact skin of the upper arms and shoulders. - Transdermal     Question: Prior auth hasn't cleared, patient seeking callback with guidance/explanation    Pharmacy confirmed as    [facility] - [place], [place] - [address] [id number] - [place], [place] - [address]  : Yes    OK to leave a message on voice mail? Yes    Primary language: English      needed? No    Call taken on August 7, 2023 at 4:53 PM by Yinka Capone

## 2023-08-08 ENCOUNTER — TELEPHONE (OUTPATIENT)
Dept: FAMILY MEDICINE | Facility: CLINIC | Age: 33
End: 2023-08-08
Payer: MEDICARE

## 2023-08-08 NOTE — TELEPHONE ENCOUNTER
Central Prior Authorization Team  Phone: [phone]    PA Initiation    Medication: TESTOSTERONE 20.25 MG/ACT (1.62%) TD GEL  Insurance Company: [employer] - Phone [phone] Fax [phone]  Pharmacy Filling the Rx: [facility] - [place], [place] - [address]  Filling Pharmacy Phone: [phone]  Filling Pharmacy Fax:    Start Date: 8/8/2023

## 2023-08-08 NOTE — TELEPHONE ENCOUNTER
Prior Authorization Approval    Medication: TESTOSTERONE 20.25 MG/ACT (1.62%) TD GEL  Authorization Effective Date: 1/1/2023  Authorization Expiration Date: 8/7/2024  Approved Dose/Quantity:   Reference #:     Insurance Company: [employer] - Phone [phone] Fax [phone]  Expected CoPay:       CoPay Card Available:      Financial Assistance Needed:   Which Pharmacy is filling the prescription: [facility] [address]  Pharmacy Notified: Yes  Patient Notified:  **Instructed pharmacy to notify patient when script is ready to /ship.**

## 2023-08-08 NOTE — TELEPHONE ENCOUNTER
Prior Authorization Specialty Medication Request    Medication/Dose: testosterone (ANDROGEL 1.62 % PUMP) 20.25 MG/ACT gel   ICD code (if different than what is on RX):    Previously Tried and Failed:      Important Lab Values:   Rationale:     Insurance Name: MEDICARE   Insurance ID: [id number]   Insurance Phone Number:     Secondary Insurance: [facility]   Insurance ID: [id number]     Pharmacy Information (if different than what is on RX)  Name:    Phone:      We were not notified that PA required, med was ordered 7/21/23, can we please expedite?    Eliana Abdullahi RN

## 2023-08-08 NOTE — TELEPHONE ENCOUNTER
Called pharmacy, they have been trying to get a PA on injectable testosterone not on the gel. Per the last visit on 7/21/23 patient wanted to switch to the gel.    Pharmacy will cancel the prescription for injectable testosterone and I will initiate the PA for the gel.     Called patient to update    Eliana Abdullahi RN

## 2023-08-10 ENCOUNTER — TELEPHONE (OUTPATIENT)
Dept: PSYCHIATRY | Facility: CLINIC | Age: 33
End: 2023-08-10
Payer: MEDICARE

## 2023-08-10 ENCOUNTER — HOSPITAL ENCOUNTER (INPATIENT)
Facility: CLINIC | Age: 33
LOS: 15 days | Discharge: NURSING, REHAB OR LONG-TERM CARE | DRG: 885 | End: 2023-08-28
Attending: FAMILY MEDICINE | Admitting: PSYCHIATRY & NEUROLOGY
Payer: MEDICARE

## 2023-08-10 ENCOUNTER — TELEPHONE (OUTPATIENT)
Dept: BEHAVIORAL HEALTH | Facility: CLINIC | Age: 33
End: 2023-08-10
Payer: MEDICARE

## 2023-08-10 DIAGNOSIS — M51.369 DDD (DEGENERATIVE DISC DISEASE), LUMBAR: ICD-10-CM

## 2023-08-10 DIAGNOSIS — F25.9 SCHIZOAFFECTIVE DISORDER, CHRONIC CONDITION WITH ACUTE EXACERBATION (H): ICD-10-CM

## 2023-08-10 DIAGNOSIS — M54.42 CHRONIC LEFT-SIDED LOW BACK PAIN WITH LEFT-SIDED SCIATICA: ICD-10-CM

## 2023-08-10 DIAGNOSIS — B96.89 BACTERIAL VAGINOSIS: ICD-10-CM

## 2023-08-10 DIAGNOSIS — F60.3 BORDERLINE PERSONALITY DISORDER (H): ICD-10-CM

## 2023-08-10 DIAGNOSIS — N76.0 BACTERIAL VAGINOSIS: ICD-10-CM

## 2023-08-10 DIAGNOSIS — Z72.0 TOBACCO ABUSE: ICD-10-CM

## 2023-08-10 DIAGNOSIS — K59.03 DRUG-INDUCED CONSTIPATION: ICD-10-CM

## 2023-08-10 DIAGNOSIS — E11.65 TYPE 2 DIABETES MELLITUS WITH HYPERGLYCEMIA, WITHOUT LONG-TERM CURRENT USE OF INSULIN (H): ICD-10-CM

## 2023-08-10 DIAGNOSIS — F41.9 ANXIETY: ICD-10-CM

## 2023-08-10 DIAGNOSIS — B00.9 HSV (HERPES SIMPLEX VIRUS) INFECTION: ICD-10-CM

## 2023-08-10 DIAGNOSIS — E11.65 TYPE 2 DIABETES MELLITUS WITH HYPERGLYCEMIA, UNSPECIFIED WHETHER LONG TERM INSULIN USE (H): ICD-10-CM

## 2023-08-10 DIAGNOSIS — F43.10 PTSD (POST-TRAUMATIC STRESS DISORDER): ICD-10-CM

## 2023-08-10 DIAGNOSIS — F25.0 SCHIZOAFFECTIVE DISORDER, BIPOLAR TYPE (H): ICD-10-CM

## 2023-08-10 DIAGNOSIS — K64.4 EXTERNAL HEMORRHOIDS: ICD-10-CM

## 2023-08-10 DIAGNOSIS — F06.30 MOOD DISORDER DUE TO A GENERAL MEDICAL CONDITION: Primary | ICD-10-CM

## 2023-08-10 DIAGNOSIS — K21.9 GASTROESOPHAGEAL REFLUX DISEASE WITHOUT ESOPHAGITIS: ICD-10-CM

## 2023-08-10 DIAGNOSIS — L70.0 ACNE VULGARIS: ICD-10-CM

## 2023-08-10 DIAGNOSIS — I10 BENIGN ESSENTIAL HYPERTENSION: ICD-10-CM

## 2023-08-10 DIAGNOSIS — R45.851 SUICIDAL IDEATION: ICD-10-CM

## 2023-08-10 DIAGNOSIS — F90.2 ATTENTION DEFICIT HYPERACTIVITY DISORDER (ADHD), COMBINED TYPE: ICD-10-CM

## 2023-08-10 DIAGNOSIS — G89.29 CHRONIC LEFT-SIDED LOW BACK PAIN WITH LEFT-SIDED SCIATICA: ICD-10-CM

## 2023-08-10 DIAGNOSIS — R11.2 NAUSEA AND VOMITING, UNSPECIFIED VOMITING TYPE: ICD-10-CM

## 2023-08-10 PROCEDURE — 99285 EMERGENCY DEPT VISIT HI MDM: CPT | Performed by: FAMILY MEDICINE

## 2023-08-10 PROCEDURE — 99285 EMERGENCY DEPT VISIT HI MDM: CPT | Mod: 25 | Performed by: FAMILY MEDICINE

## 2023-08-10 PROCEDURE — 83036 HEMOGLOBIN GLYCOSYLATED A1C: CPT | Performed by: PHYSICIAN ASSISTANT

## 2023-08-10 PROCEDURE — 80053 COMPREHEN METABOLIC PANEL: CPT | Performed by: FAMILY MEDICINE

## 2023-08-10 PROCEDURE — 90791 PSYCH DIAGNOSTIC EVALUATION: CPT

## 2023-08-10 PROCEDURE — 36415 COLL VENOUS BLD VENIPUNCTURE: CPT | Performed by: FAMILY MEDICINE

## 2023-08-10 PROCEDURE — 85004 AUTOMATED DIFF WBC COUNT: CPT | Performed by: FAMILY MEDICINE

## 2023-08-10 RX ORDER — BUSPIRONE HYDROCHLORIDE 10 MG/1
10 TABLET ORAL 3 TIMES DAILY
Status: DISCONTINUED | OUTPATIENT
Start: 2023-08-11 | End: 2023-08-16

## 2023-08-10 RX ORDER — NICOTINE POLACRILEX 4 MG
15-30 LOZENGE BUCCAL
Status: DISCONTINUED | OUTPATIENT
Start: 2023-08-10 | End: 2023-08-28 | Stop reason: HOSPADM

## 2023-08-10 RX ORDER — AMLODIPINE BESYLATE 10 MG/1
10 TABLET ORAL DAILY
Status: DISCONTINUED | OUTPATIENT
Start: 2023-08-11 | End: 2023-08-28 | Stop reason: HOSPADM

## 2023-08-10 RX ORDER — DOXYCYCLINE HYCLATE 50 MG/1
100 CAPSULE ORAL 2 TIMES DAILY
Status: DISCONTINUED | OUTPATIENT
Start: 2023-08-10 | End: 2023-08-28 | Stop reason: HOSPADM

## 2023-08-10 RX ORDER — TESTOSTERONE 20.25 MG/1.25G
20.25 GEL TOPICAL DAILY
Status: DISCONTINUED | OUTPATIENT
Start: 2023-08-11 | End: 2023-08-28 | Stop reason: HOSPADM

## 2023-08-10 RX ORDER — DEXTROSE MONOHYDRATE 25 G/50ML
25-50 INJECTION, SOLUTION INTRAVENOUS
Status: DISCONTINUED | OUTPATIENT
Start: 2023-08-10 | End: 2023-08-28 | Stop reason: HOSPADM

## 2023-08-10 RX ORDER — GABAPENTIN 600 MG/1
1200 TABLET ORAL 3 TIMES DAILY
Status: DISCONTINUED | OUTPATIENT
Start: 2023-08-11 | End: 2023-08-28 | Stop reason: HOSPADM

## 2023-08-10 RX ORDER — QUETIAPINE FUMARATE 100 MG/1
200 TABLET, FILM COATED ORAL DAILY
Status: DISCONTINUED | OUTPATIENT
Start: 2023-08-11 | End: 2023-08-10

## 2023-08-10 RX ORDER — CLONAZEPAM 0.5 MG/1
0.5 TABLET ORAL DAILY
Status: DISCONTINUED | OUTPATIENT
Start: 2023-08-11 | End: 2023-08-14

## 2023-08-10 RX ORDER — ONDANSETRON 4 MG/1
4 TABLET, ORALLY DISINTEGRATING ORAL DAILY PRN
Status: DISCONTINUED | OUTPATIENT
Start: 2023-08-10 | End: 2023-08-28 | Stop reason: HOSPADM

## 2023-08-10 RX ORDER — CLONAZEPAM 0.5 MG/1
0.5 TABLET ORAL DAILY
Status: ON HOLD | COMMUNITY
End: 2023-08-28

## 2023-08-10 RX ORDER — QUETIAPINE FUMARATE 100 MG/1
200 TABLET, FILM COATED ORAL DAILY
Status: DISCONTINUED | OUTPATIENT
Start: 2023-08-10 | End: 2023-08-11

## 2023-08-10 ASSESSMENT — ACTIVITIES OF DAILY LIVING (ADL)
ADLS_ACUITY_SCORE: 37

## 2023-08-10 NOTE — ED NOTES
AIDET: done    Security ( Search ): done    Belongings:  [ 1  ] BAG(s)    CELLPHONE  [ YES ]  WALLET   [ NO ]    >w/pt: clothing, cellphone      >rack: sandals, vape, lighter      >security:   none

## 2023-08-10 NOTE — ED NOTES
Bed: [facility]  Expected date: 8/10/23  Expected time: 4:26 PM  Means of arrival:   Comments:  N 713: 32 y/o, M, Meth & SI

## 2023-08-10 NOTE — ED PROVIDER NOTES
"ED Provider Note  [facility]      History     Chief Complaint   Patient presents with    Suicidal     Suicidal ideational with no plan. Voiced that these were the strongest urges they have ever had, has attempted suicide 4x prior. Hx of hypertension, systolic bp 138, , . A few weeks ago they stated they \"relapsed\" On adderall, by buying it off the street. They currently have a prescription for adderall. Feeling hopeless. Are the type 2 diabetic has been refusing to take insulin and ozempic for the last two months.      HPI  Ayana Prasad \"Prakash\" is a 33 year old adult with PMH of schizoaffective/bipolar disorder, SI with 4 suicide attempts, anxiety, ADHD, polysubstance abuse, HTN, T2DM, and gender dysphoria who presents to the ED BIBA with SI with no plan. They note these were the strongest urges they have ever had.  Patient was in contact with the psychiatry clinic today and was calling to discuss \"best ways to kill himself\" patient is currently under commitment stay of commitment is still in place however patient is voluntarily here with increased suicidal thoughts and inability to maintain safety.    Past Medical History  Past Medical History:   Diagnosis Date    ADHD (attention deficit hyperactivity disorder)     Bipolar 1 disorder     Borderline personality disorder     Cauda equina syndrome     Chronic low back pain     Depression     Diabetes mellitus, type 2 (H) 1/19/2023    GERD (gastroesophageal reflux disease)     h/o TBI (traumatic brain injury)     Hypertension, unspecified type 12/16/2021    Marginal corneal ulcer, left 07/17/2015    Nephrolithiasis     obesity     Polysubstance abuse - methamphetamine, hallucinagen, heroin, marijuana     currently in remission    PONV (postoperative nausea and vomiting)     PTSD (post-traumatic stress disorder)      Past Surgical History:   Procedure Laterality Date    BACK SURGERY  12/24/2016    BACK SURGERY - For Cauda " Equina Syndrome  12/24/2016    COLONOSCOPY      COMBINED CYSTOSCOPY, INSERT STENT URETER(S) Left 8/30/2018    Procedure: COMBINED CYSTOSCOPY, INSERT STENT URETER(S);  Cystoscopy With Left Stent Placement;  Surgeon: Kiran Ulrich MD;  Location: [facility]    COMBINED CYSTOSCOPY, RETROGRADES, EXCHANGE STENT URETER(S) Left 10/14/2018    Procedure: COMBINED CYSTOSCOPY, RETROGRADES, EXCHANGE STENT URETER(S);  Cystoscopy and removal of left-sided stent.;  Surgeon: Stiven Olivo MD;  Location:  OR    COMBINED CYSTOSCOPY, RETROGRADES, URETEROSCOPY, INSERT STENT  1/6/2014    Procedure: COMBINED CYSTOSCOPY, RETROGRADES, URETEROSCOPY, INSERT STENT;  Cystyoscopy place left ureteral stent;  Surgeon: Jaun Kimble MD;  Location: [facility] OR    COMBINED CYSTOSCOPY, RETROGRADES, URETEROSCOPY, INSERT STENT Left 10/23/2018    Procedure: Video cystoscopy, left ureteroscopy with stone extraction;  Surgeon: Bull Mast MD;  Location:  OR    CYSTOSCOPY, URETEROSCOPY, COMBINED Right 9/23/2015    Procedure: COMBINED CYSTOSCOPY, URETEROSCOPY;  Surgeon: ROME Jett MD;  Location: [facility] OR    ENT SURGERY      ESOPHAGOSCOPY, GASTROSCOPY, DUODENOSCOPY (EGD), COMBINED  4/8/2013    Procedure: COMBINED ESOPHAGOSCOPY, GASTROSCOPY, DUODENOSCOPY (EGD), BIOPSY SINGLE OR MULTIPLE;  Gastroscopy;  Surgeon: Peter Pickard MD;  Location: [facility]    ESOPHAGOSCOPY, GASTROSCOPY, DUODENOSCOPY (EGD), COMBINED Left 10/28/2014    Procedure: COMBINED ESOPHAGOSCOPY, GASTROSCOPY, DUODENOSCOPY (EGD), BIOPSY SINGLE OR MULTIPLE;  Surgeon: Narcisa Ramirez MD;  Location:  OR    ESOPHAGOSCOPY, GASTROSCOPY, DUODENOSCOPY (EGD), COMBINED N/A 12/24/2018    Procedure: COMBINED ESOPHAGOSCOPY, GASTROSCOPY, DUODENOSCOPY (EGD), BIOPSY SINGLE OR MULTIPLE;  Surgeon: Sonu Verduzco MD;  Location: [facility]    INJECT EPIDURAL TRANSFORAMINAL LUMBAR / SACRAL EA ADDITIONAL LEVEL Left 3/18/2021    Procedure: Left L5/S1 transforaminal injection for  selective L5 nerve root block;  Surgeon: Eliza Pagan MD;  Location: [facility] OR    LAPAROSCOPIC CHOLECYSTECTOMY  11/20/2014    [facility], Dr. Ramirez    LASER HOLMIUM LITHOTRIPSY URETER(S), INSERT STENT, COMBINED  4/2/2014    Procedure: COMBINED CYSTOSCOPY, URETEROSCOPY, LASER HOLMIUM LITHOTRIPSY URETER(S), INSERT STENT;  Cystoscopy,Left Ureteral Stent Removal,Left Ureteroscopy with Laser Lithotripsy,Left Ureter Stent Placement;  Surgeon: ROME Jett MD;  Location: [facility] OR    Transurethral stone resection  03/11/2011     amLODIPine (NORVASC) 10 MG tablet  [START ON 9/7/2023] amphetamine-dextroamphetamine (ADDERALL XR) 25 MG 24 hr capsule  busPIRone (BUSPAR) 10 MG tablet  clonazePAM (KLONOPIN) 0.5 MG tablet  doxycycline monohydrate (MONODOX) 100 MG capsule  gabapentin (NEURONTIN) 600 MG tablet  insulin glargine (LANTUS PEN) 100 UNIT/ML pen  melatonin 5 MG tablet  omeprazole (PRILOSEC) 20 MG DR capsule  ondansetron (ZOFRAN ODT) 4 MG ODT tab  QUEtiapine (SEROQUEL) 200 MG tablet  Semaglutide, 1 MG/DOSE, (OZEMPIC) 4 MG/3ML pen  testosterone (ANDROGEL 1.62 % PUMP) 20.25 MG/ACT gel  tretinoin (RETIN-A) 0.05 % external cream  ACE/ARB/ARNI NOT PRESCRIBED (INTENTIONAL)  amphetamine-dextroamphetamine (ADDERALL XR) 25 MG 24 hr capsule  [START ON 10/7/2023] amphetamine-dextroamphetamine (ADDERALL XR) 25 MG 24 hr capsule  blood glucose (NO BRAND SPECIFIED) test strip  Continuous Blood Gluc  (FREESTYLE COLTEN 2 READER) ZEINAB  Continuous Blood Gluc Sensor (FREESTYLE COLTEN 2 SENSOR) MISC  thin (NO BRAND SPECIFIED) lancets      Allergies   Allergen Reactions    Haldol [Haloperidol] Other (See Comments)     Makes patient very angry and anxious    Adhesive Tape Hives    Percocet [Oxycodone-Acetaminophen] Nausea and Vomiting    Prednisone Other (See Comments) and Hives     Suicidal ideation    Risperidone Other (See Comments)    Tramadol Hcl Nausea and Vomiting    Droperidol Anxiety    Seroquel [Quetiapine]  Palpitations     Spent 2 weeks in the hospital due to having seroquel, caused palpitations and QT prolongation     Family History  Family History   Problem Relation Age of Onset    Gastrointestinal Disease Father         Crohn's Disease    Cancer Father         Liver Cancer    Other Cancer Father         Liver    Obesity Father     Cancer Maternal Grandmother         lympoma    Diabetes Maternal Grandmother     Mental Illness Maternal Grandmother     Other Cancer Maternal Grandmother         Non Hodgkins Lymphoma    Diabetes Maternal Grandfather     Hypertension Maternal Grandfather     Prostate Cancer Maternal Grandfather     Hyperlipidemia Maternal Grandfather     Heart Disease Paternal Grandfather         heart disease    Hypertension Brother     Other Cancer Brother         Esophagecial    Diabetes Brother     Obesity Brother     Hyperlipidemia Mother     Mental Illness Mother     Anxiety Disorder Mother     Anesthesia Reaction Mother         Vomiting/Nausea    Other Cancer Mother     Hypertension Brother     Other Cancer Brother     Other Cancer Paternal Half-Brother     No Known Problems Sister      Social History   Social History     Tobacco Use    Smoking status: Every Day     Packs/day: 0.50     Years: 5.00     Pack years: 2.50     Types: Other, Cigarettes     Start date: 2011     Last attempt to quit: 2022     Years since quittin.9     Passive exposure: Never    Smokeless tobacco: Former     Types: Chew     Quit date: 2019    Tobacco comments:     Just nicotine gum currently. 23   Vaping Use    Vaping Use: Former    Substances: Nicotine, Flavoring    Devices: Refillable tank   Substance Use Topics    Alcohol use: No    Drug use: Not Currently     Types: Marijuana, Opiates     Comment: oxy, heroin (smoke)         A medically appropriate review of systems was performed with pertinent positives and negatives noted in the HPI, and all other systems negative.    Physical Exam   BP: (!)  132/90  Pulse: 88  Temp: 98.5  F (36.9  C)  Resp: 16  SpO2: 96 %  Physical Exam  Constitutional:       General: He is not in acute distress.     Appearance: Normal appearance. He is not diaphoretic.   HENT:      Head: Atraumatic.      Mouth/Throat:      Mouth: Mucous membranes are moist.   Eyes:      General: No scleral icterus.     Conjunctiva/sclera: Conjunctivae normal.   Cardiovascular:      Rate and Rhythm: Normal rate.      Heart sounds: Normal heart sounds.   Pulmonary:      Effort: No respiratory distress.      Breath sounds: Normal breath sounds.   Abdominal:      General: Abdomen is flat.   Musculoskeletal:      Cervical back: Neck supple.   Skin:     General: Skin is warm.      Findings: No rash.   Neurological:      General: No focal deficit present.      Mental Status: He is alert and oriented to person, place, and time.      Sensory: No sensory deficit.      Motor: No weakness.      Coordination: Coordination normal.   Psychiatric:         Mood and Affect: Mood is anxious and depressed.         Speech: Speech is delayed.         Behavior: Behavior is slowed and withdrawn.         Thought Content: Thought content includes suicidal ideation. Thought content includes suicidal plan.           ED Course, Procedures, & Data      Procedures       Suicide assessment completed by mental health (D.E.C., LCSW, etc.)         Medications   amLODIPine (NORVASC) tablet 10 mg (has no administration in time range)   amphetamine-dextroamphetamine (ADDERALL XR) 15 mg, amphetamine-dextroamphetamine (ADDERALL XR) 10 mg (has no administration in time range)   busPIRone (BUSPAR) tablet 10 mg (has no administration in time range)   clonazePAM (klonoPIN) tablet 0.5 mg (has no administration in time range)   doxycycline hyclate (VIBRAMYCIN) capsule 100 mg (has no administration in time range)   gabapentin (NEURONTIN) tablet 1,200 mg (has no administration in time range)   insulin glargine (LANTUS PEN) injection 15 Units (has no  administration in time range)   omeprazole (PriLOSEC) CR capsule 20 mg (has no administration in time range)   ondansetron (ZOFRAN ODT) ODT tab 4 mg (has no administration in time range)   testosterone (ANDROGEL) topical gel 20.25 mg (has no administration in time range)   QUEtiapine (SEROquel) tablet 200 mg (has no administration in time range)     Labs Ordered and Resulted from Time of ED Arrival to Time of ED Departure - No data to display  No orders to display          Critical care was not performed.     Medical Decision Making  The patient's presentation was of high complexity (a chronic illness severe exacerbation, progression, or side effect of treatment).    The patient's evaluation involved:  ordering and/or review of 3+ test(s) in this encounter (see separate area of note for details)  discussion of management or test interpretation with another health professional (independent provider discussed possible admission versus outpatient management at this time patient acutely suicidal unable to maintain safety)    The patient's management necessitated high risk (a decision regarding hospitalization).    Assessment & Plan        I have reviewed the nursing notes. I have reviewed the findings, diagnosis, plan and need for follow up with the patient.    Patient with underlying borderline personality disorder schizoaffective disorder PTSD now presenting with increased suicidal ideation at this time will require inpatient stabilization patient is under commitment but on stay of commitment the stay of commitment has not been revoked as of yet however patient would need a 72-hour hold if they attempt to leave.    Final diagnoses:   Borderline personality disorder (H)   Schizoaffective disorder, chronic condition with acute exacerbation (H)   PTSD (post-traumatic stress disorder)   Suicidal ideation       Hector Mendoza  [facility] EMERGENCY DEPARTMENT  8/10/2023     Hector Mendoza,  MD  08/10/23 [id number]

## 2023-08-10 NOTE — TELEPHONE ENCOUNTER
Patient called and asked to speak to writer. He stated that he was not doing well and was feeling very suicidal and wanted to know what to do. He said that he has been trying to think of ways all day. He was resistant to going to the hospital. He clarified that he was not calling to get help that way he wanted help in how to kill himself. He said he was unable to come up with the best way. Writer reached out to nursing team to have them call 911 while remaining on line with patient.     Risk factors:   Patient states that he relapsed on amphetamines and was embarrassed. He has not used today and was having some withdrawal symptoms. Encouraged patient to go to hospital for comfort meds to help with the withdrawal.   Hopelessness.Patient feels like he has tried many treatments and medications over the years and they have not helped with his mental health. Reminded patient that he was doing really well while he was on lithium and he agreed. (Patient chose to stop due to labs) Explained that they can help with med changes at a much faster rate in the hospital.   Lost his therapist. He states that his therapist is done as of last week. Writer will help patient get a new therapist.     Protective factors:   Dog, Nugget. He agrees that he does not want to leave [place]get alone.     Patient still hesitant to go to hospital. He doesn't want to be placed on a hold. Patient states at one point that if police come he will try to do suicide by  but he then agreed to be safe right before the showed up. He did agree to leave writer on speaker phone while he talked with EMS. He was calm with them and did agree to go to the ED. Writer will check in tomorrow with patient.     Patient called back while in the ambulance and was anxious and thinks he is no longer wanting to go to the hospital.Talked with him for a while and he is agreeable to go still.     Updated ED charge nurse. Will call  and update as patient is on a  stay of commitment.

## 2023-08-10 NOTE — TELEPHONE ENCOUNTER
Called and LVM to [facility], requesting pt to be placed on hold should he decide to leave. Pls see phone conversation with nursing staff same date.  Talked with nursing staff. Pt is currently on a stay of commitment, has not used substance over a year, but recently restarted to use amphetamine and became very suicidal.  Recently Lamictal 25 mg was discontinued as he has not been taking the medication consistently. Pt was doing well previously on Lithium 900 mg HS until 3/2023 when he decided to stop the medication due to numerous lab needs. Pt did not have any mental health ED visits while on ED, but started to have few ED visits and inpatient hospitalization after discontinuing Lithium.  Would strongly recommend hospitalization and restart on lithium. Pt was using substance at home. Regine Last, CNP, 8/10/2023

## 2023-08-11 ENCOUNTER — TELEPHONE (OUTPATIENT)
Dept: BEHAVIORAL HEALTH | Facility: CLINIC | Age: 33
End: 2023-08-11
Payer: MEDICARE

## 2023-08-11 LAB
ALBUMIN SERPL BCG-MCNC: 4.4 G/DL (ref 3.5–5.2)
ALP SERPL-CCNC: 89 U/L (ref 35–129)
ALT SERPL W P-5'-P-CCNC: 82 U/L (ref 0–70)
ANION GAP SERPL CALCULATED.3IONS-SCNC: 14 MMOL/L (ref 7–15)
AST SERPL W P-5'-P-CCNC: 106 U/L (ref 0–45)
BASOPHILS # BLD AUTO: 0.1 10E3/UL (ref 0–0.2)
BASOPHILS NFR BLD AUTO: 1 %
BILIRUB SERPL-MCNC: 0.4 MG/DL
BUN SERPL-MCNC: 12.9 MG/DL (ref 6–20)
CALCIUM SERPL-MCNC: 9.5 MG/DL (ref 8.6–10)
CHLORIDE SERPL-SCNC: 101 MMOL/L (ref 98–107)
CREAT SERPL-MCNC: 0.71 MG/DL (ref 0.51–1.17)
DEPRECATED HCO3 PLAS-SCNC: 21 MMOL/L (ref 22–29)
EOSINOPHIL # BLD AUTO: 0.3 10E3/UL (ref 0–0.7)
EOSINOPHIL NFR BLD AUTO: 2 %
ERYTHROCYTE [DISTWIDTH] IN BLOOD BY AUTOMATED COUNT: 14.6 % (ref 10–15)
GFR SERPL CREATININE-BSD FRML MDRD: >90 ML/MIN/1.73M2
GLUCOSE BLDC GLUCOMTR-MCNC: 149 MG/DL (ref 70–99)
GLUCOSE BLDC GLUCOMTR-MCNC: 163 MG/DL (ref 70–99)
GLUCOSE SERPL-MCNC: 120 MG/DL (ref 70–99)
HCT VFR BLD AUTO: 43.9 % (ref 35–53)
HGB BLD-MCNC: 14.7 G/DL (ref 11.7–17.7)
IMM GRANULOCYTES # BLD: 0 10E3/UL
IMM GRANULOCYTES NFR BLD: 0 %
LYMPHOCYTES # BLD AUTO: 3.2 10E3/UL (ref 0.8–5.3)
LYMPHOCYTES NFR BLD AUTO: 30 %
MCH RBC QN AUTO: 27.4 PG (ref 26.5–33)
MCHC RBC AUTO-ENTMCNC: 33.5 G/DL (ref 31.5–36.5)
MCV RBC AUTO: 82 FL (ref 78–100)
MONOCYTES # BLD AUTO: 0.7 10E3/UL (ref 0–1.3)
MONOCYTES NFR BLD AUTO: 6 %
NEUTROPHILS # BLD AUTO: 6.7 10E3/UL (ref 1.6–8.3)
NEUTROPHILS NFR BLD AUTO: 61 %
NRBC # BLD AUTO: 0 10E3/UL
NRBC BLD AUTO-RTO: 0 /100
PLATELET # BLD AUTO: 273 10E3/UL (ref 150–450)
POTASSIUM SERPL-SCNC: 3.5 MMOL/L (ref 3.4–5.3)
PROT SERPL-MCNC: 7.5 G/DL (ref 6.4–8.3)
RBC # BLD AUTO: 5.36 10E6/UL (ref 3.8–5.9)
SODIUM SERPL-SCNC: 136 MMOL/L (ref 136–145)
WBC # BLD AUTO: 10.9 10E3/UL (ref 4–11)

## 2023-08-11 PROCEDURE — 250N000013 HC RX MED GY IP 250 OP 250 PS 637: Performed by: FAMILY MEDICINE

## 2023-08-11 PROCEDURE — 82962 GLUCOSE BLOOD TEST: CPT

## 2023-08-11 PROCEDURE — 250N000013 HC RX MED GY IP 250 OP 250 PS 637: Performed by: EMERGENCY MEDICINE

## 2023-08-11 RX ORDER — HYDROXYZINE HYDROCHLORIDE 25 MG/1
25 TABLET, FILM COATED ORAL EVERY 6 HOURS PRN
Status: DISCONTINUED | OUTPATIENT
Start: 2023-08-11 | End: 2023-08-28 | Stop reason: HOSPADM

## 2023-08-11 RX ORDER — QUETIAPINE FUMARATE 25 MG/1
50 TABLET, FILM COATED ORAL AT BEDTIME
Status: DISCONTINUED | OUTPATIENT
Start: 2023-08-11 | End: 2023-08-11

## 2023-08-11 RX ORDER — QUETIAPINE FUMARATE 200 MG/1
200 TABLET, FILM COATED ORAL AT BEDTIME
Status: DISCONTINUED | OUTPATIENT
Start: 2023-08-12 | End: 2023-08-13

## 2023-08-11 RX ORDER — HYDROXYZINE HYDROCHLORIDE 50 MG/1
50 TABLET, FILM COATED ORAL EVERY 6 HOURS PRN
Status: DISCONTINUED | OUTPATIENT
Start: 2023-08-11 | End: 2023-08-28 | Stop reason: HOSPADM

## 2023-08-11 RX ORDER — QUETIAPINE FUMARATE 50 MG/1
50 TABLET, FILM COATED ORAL AT BEDTIME
Status: COMPLETED | OUTPATIENT
Start: 2023-08-11 | End: 2023-08-11

## 2023-08-11 RX ADMIN — GABAPENTIN 1200 MG: 600 TABLET, FILM COATED ORAL at 16:44

## 2023-08-11 RX ADMIN — NICOTINE POLACRILEX 4 MG: 4 GUM, CHEWING BUCCAL at 13:40

## 2023-08-11 RX ADMIN — AMLODIPINE BESYLATE 10 MG: 10 TABLET ORAL at 11:15

## 2023-08-11 RX ADMIN — QUETIAPINE FUMARATE 200 MG: 100 TABLET ORAL at 11:12

## 2023-08-11 RX ADMIN — HYDROXYZINE HYDROCHLORIDE 50 MG: 50 TABLET, FILM COATED ORAL at 19:41

## 2023-08-11 RX ADMIN — DEXTROAMPHETAMINE SULFATE, DEXTROAMPHETAMINE SACCHARATE, AMPHETAMINE SULFATE AND AMPHETAMINE ASPARTATE 25 MG: 3.75; 3.75; 3.75; 3.75 CAPSULE, EXTENDED RELEASE ORAL at 10:10

## 2023-08-11 RX ADMIN — DOXYCYCLINE HYCLATE 100 MG: 50 CAPSULE ORAL at 19:41

## 2023-08-11 RX ADMIN — Medication 5 MG: at 20:06

## 2023-08-11 RX ADMIN — HYDROXYZINE HYDROCHLORIDE 25 MG: 25 TABLET, FILM COATED ORAL at 12:42

## 2023-08-11 RX ADMIN — BUSPIRONE HYDROCHLORIDE 10 MG: 10 TABLET ORAL at 11:14

## 2023-08-11 RX ADMIN — NICOTINE POLACRILEX 4 MG: 4 GUM, CHEWING BUCCAL at 20:06

## 2023-08-11 RX ADMIN — BUSPIRONE HYDROCHLORIDE 10 MG: 10 TABLET ORAL at 16:44

## 2023-08-11 RX ADMIN — GABAPENTIN 1200 MG: 600 TABLET, FILM COATED ORAL at 11:13

## 2023-08-11 RX ADMIN — OMEPRAZOLE 20 MG: 20 CAPSULE, DELAYED RELEASE ORAL at 11:13

## 2023-08-11 RX ADMIN — NICOTINE POLACRILEX 4 MG: 4 GUM, CHEWING BUCCAL at 11:49

## 2023-08-11 RX ADMIN — DOXYCYCLINE HYCLATE 100 MG: 50 CAPSULE ORAL at 11:14

## 2023-08-11 RX ADMIN — CLONAZEPAM 0.5 MG: 0.5 TABLET ORAL at 08:51

## 2023-08-11 RX ADMIN — TESTOSTERONE 20.25 MG: 20.25 GEL TOPICAL at 08:39

## 2023-08-11 ASSESSMENT — ACTIVITIES OF DAILY LIVING (ADL)
ADLS_ACUITY_SCORE: 37

## 2023-08-11 NOTE — PROGRESS NOTES
Triage & Transition Services, Extended Care    Client Name: Ayana Prasad    Date: August 11, 2023  Service Type:  Group Therapy  Session Start Time:  2:45 pm    Session End Time: 3:00 pm  Session Length: 15  Site Location: [facility]  Attendees: Patient and other group members  Facilitator: Dian Prado     Topic:   Art Group    Intervention:    Group process: support, challenge, affirm, psycho-education.     Response:  Patient did not participate in group.        Dian Prado

## 2023-08-11 NOTE — PLAN OF CARE
Ayana Prasad  August 10, 2023  Plan of Care Hand-off Note     Patient Care Path: inpatient mental health    Plan for Care:   It is the recommendation of this clinician that pt admit to IP MH for safety and stabilization. Pt displays the following risk factors that support IP admission: pt present with SI and intent. He denies a plan at this time, though reported earlier today to his psych clinic considering suicide by . Reports SI is constant and the worst he has ever experienced. Reports he has been asking others in his life for weapons or means to hurt himself. Endorses feelings of hopelessness. Per note from Regine Last today (med provider) pt was previously doing well on lithium 900 mg HS until 3/23 when he decided to stop due to numerous labs needed. Regine notes that pt started to have increased ED visits and IP MH after discontinuing lithium. eRgine strongly recommends hospitalization and restarting lithium.Pt is unable to engage in safety planning to mitigate risk level in a non-secure setting. Lower levels of care would not be sufficient in managing the level of risk pt is presenting with. Due to this IP is the least restrictive option of care for pt. Pt should remain in IP until deemed safe to return to the community and engage in OP MH supports. Pt will need assistance establishing OP MH services prior to discharge.    Identified Goals and Safety Issues: SI, crisis stabilization - provider recommends restarting lithium    Overview:  Domenica, , [phone]     Negra Prasad, mother, [phone]      Legal Status: Legal Status at Admission: Commitment, 72 Hour Hold  72 Hour Hold - Date/Time Initiated: 8/10/2023 at 2305 (11:05 pm)  72 Hour Hold - Date/Time Ends: 8/15/2023 at 2305 (11:05 pm)    Updated MD and RN regarding plan of care.        Angi Escobar [facility]  [phone]

## 2023-08-11 NOTE — ED NOTES
Pt requested again to go home. Dr. Pagan stated he will place him on Hold and MD informed pt. Refused Vibramycin. Labs ordered.

## 2023-08-11 NOTE — ED NOTES
"Pt presents calm, pleasant, and cooperative. Pt reports SI but denies HI, SIB, and hallucinations. Pt reports wanting to discharge today and has stated multiple times \"I can't be here anymore, I need to go home\". When writer asked if they were still having SI, Pt responded \"yes\". Pt explained that he feels there is no point in staying alive and it would provide him with happiness if he could \"just end it\". Writer explained that due to these feelings, he will not be discharging, however writer explained that going inpatient is extremely important for Pt's overall wellbeing. Writer explained to Pt that going inpatient and working with his team will help him to not have these feeling.   Pt declined most medications this morning. Writer explained to Pt that taking his meds may significantly help his overall mental health. Pt still declined most medications.  Pt is concerned that he started his menstrual cycle today which he hasn't had for three years due to taking testosterone and would like to speak with a doctor about this.  VSS, partially med compliant, behaviorally in control.   "

## 2023-08-11 NOTE — TELEPHONE ENCOUNTER
[place]: [place]  Access Inpatient Bed Call Log 8/11/23 8:40 AM   Intake has called facilities that have not updated the15 bed status within the last 12 hours.                         [facility] is at capacity.           [facility] is posting 0 beds. [phone]. Per call @9:10am  [facility] is posting 0 beds. Negative covid required.              [facility] is posting 0 beds. Negative covid required. [phone]. Per call @8:37am, CRN in meeting. Intake can call back after an hour.  [facility] is posting 0 beds.     [facility] is posting 0 beds. [phone]       [facility] is posting 0 beds        [facility] is posting 0 beds.       Pt remains on the work list pending appropriate bed availability.

## 2023-08-11 NOTE — ED PROVIDER NOTES
[facility] ED Mental Health Handoff Note:       Brief HPI:  This is a 33 year old adult signed out to me. .  See initial ED Provider note for full details of the presentation. The patient continues to be suicidal with plan to kill themself if they go home. Will continue with plan for admission.     Home meds reviewed and ordered/administered: Yes    Medically stable for inpatient mental health admission: Yes.    Evaluated by mental health: Yes. The recommendation is for inpatient mental health treatment. Bed search in process    Safety concerns: At the time I received sign out, there were no safety concerns.    Hold Status:  Active Orders   Legal    Emergency Hospitalization Hold (72 Hr Hold)     Frequency: Effective Now     Start Date/Time: 08/10/23 2305      Number of Occurrences: Until Specified         Exam:   Patient Vitals for the past 24 hrs:   BP Temp Temp src Pulse Resp SpO2   08/11/23 0850 131/87 98.2  F (36.8  C) Oral 71 16 96 %   08/10/23 1931 (!) 161/109 98.4  F (36.9  C) Oral 91 16 94 %   08/10/23 1859 (!) 132/90 98.5  F (36.9  C) Oral 88 16 96 %           ED Course:    Medications   amLODIPine (NORVASC) tablet 10 mg (10 mg Oral $Given 8/11/23 1115)   amphetamine-dextroamphetamine (ADDERALL XR) 15 mg, amphetamine-dextroamphetamine (ADDERALL XR) 10 mg (25 mg Oral $New Bag 8/11/23 1010)   busPIRone (BUSPAR) tablet 10 mg (10 mg Oral $Given 8/11/23 1114)   clonazePAM (klonoPIN) tablet 0.5 mg (0.5 mg Oral $Given 8/11/23 0851)   doxycycline hyclate (VIBRAMYCIN) capsule 100 mg (100 mg Oral $Given 8/11/23 1114)   gabapentin (NEURONTIN) tablet 1,200 mg (1,200 mg Oral $Given 8/11/23 1113)   insulin glargine (LANTUS PEN) injection 15 Units (15 Units Subcutaneous Not Given 8/11/23 0730)   omeprazole (PriLOSEC) CR capsule 20 mg (20 mg Oral $Given 8/11/23 1113)   ondansetron (ZOFRAN ODT) ODT tab 4 mg (has no administration in time range)   testosterone (ANDROGEL) topical gel 20.25 mg (20.25 mg Transdermal  $Given 8/11/23 0839)   QUEtiapine (SEROquel) tablet 200 mg (200 mg Oral $Given 8/11/23 1112)   glucose gel 15-30 g (has no administration in time range)     Or   dextrose 50 % injection 25-50 mL (has no administration in time range)     Or   glucagon injection 1 mg (has no administration in time range)   nicotine polacrilex (NICORETTE) gum 4 mg (4 mg Buccal $Given 8/11/23 1149)            There were no significant events during my shift.    Patient was signed out to the oncoming provider.       Impression:    ICD-10-CM    1. Borderline personality disorder (H)  F60.3       2. Schizoaffective disorder, chronic condition with acute exacerbation (H)  F25.9       3. PTSD (post-traumatic stress disorder)  F43.10       4. Suicidal ideation  R45.851           Plan:    Awaiting inpatient mental health admission/transfer.      RESULTS:   Results for orders placed or performed during the hospital encounter of 08/10/23 (from the past 24 hour(s))   [facility] Assessment Consult Order:     Status: None ()    Collection Time: 08/10/23  8:50 PM    Narrative    Angi Escobar LGSW     8/10/2023 11:24 PM  Diagnostic Evaluation Consultation  Crisis Assessment    Patient Name: Ayana Prasad  Age:  33 year old  Legal Sex: female  Gender Identity: transgender male  Pronouns: he/him/his  Race: White  Ethnicity: Not  or   Language: English      Patient was assessed: In person      Patient location: [facility] EMERGENCY DEPARTMENT                             [facility]    Referral Data and Chief Complaint  Ayana Prasad presents to the ED via EMS. Patient is presenting   to the ED for the following concerns: Suicidal ideation.     Factors that make the mental health crisis life threatening or   complex are:  Pt presents to [facility] ED at the recommendation of   their psych provider reporting SI, no plan, but intent. Pt   reports this is the strongest these thoughts have ever been and   they do  "not feel they can keep themselves safe.    Informed Consent and Assessment Methods  Explained the crisis assessment process, including applicable   information disclosures and limits to confidentiality, assessed   understanding of the process, and obtained consent to proceed   with the assessment.  Assessment methods included conducting a   formal interview with patient, review of medical records,   collaboration with medical staff, and obtaining relevant   collateral information from family and community providers when   available.  : done     Patient response to interventions: acceptance expressed  Coping skills were attempted to reduce the crisis:  Pt denies   attempting any use of coping skills prior to arrival to the ED.   They do not feel anything is helpful to provide relief for their   symptoms.     History of the Crisis   Pt has a hx of schizoaffective disorder, bipolar type, borderline   personality disorder, AVA, ADHD, PTSD and polysubstance abuse. Pt   currently under stay of commitment through [facility]. Pt   had contacted their psych clinic earlier today reporting SI and   not knowing what to do. Pt had reported thinking of different   means to do this all day. Pt clarified that he was not seeking   help to get treatment, but rather wanted to know the best way to   kill himself. Pt was resistant to going to the hospital during   this conversation and had reported considering suicide by  if   they were called to respond to the scene. Did eventually agree to   go to the ED. At the time of assessment, pt continued to endorse   SI, no plan, but intent. Reports this is the strongest these   thoughts have ever been and \"I just feel a powerful drive to end   my life.\" Reports he has been reaching out to people to get help   obtaining weapons. Endorses feeling hopeless. Endorses anhedonia,   lack of appetite and reports he has not slept for 4 days. Reports   recently relapsing on adderall. Did not use " this today and felt   he was experiencing some withdrawal symptoms. Pt endorses a hx of   AH and VH, but denies any at the time of assessment. Denies HI or   recent NSSI.    Brief Psychosocial History  Family:  , Children no  Support System:  Parent(s), Other (specify) (pt reports parents   and roommates are supportive)  Employment Status:  unemployed  Source of Income:  unknown  Financial Environmental Concerns:  No concerns identified  Current Hobbies:  other (see comments) (pt denied having any   hobbies or interests at the time of assessment)  Barriers in Personal Life:  mental health concerns    Significant Clinical History  Current Anxiety Symptoms:     Current Depression/Trauma:  hoplessness, thoughts of   death/suicide  Current Somatic Symptoms:   (none)  Current Psychosis/Thought Disturbance:   (none)  Current Eating Symptoms:  loss of appetite  Chemical Use History:  Alcohol: None  Benzodiazepines: None  Opiates: None  Cocaine: None  Marijuana: None  Other Use: Other (comments) (adderall abuse)  Last Use:: 08/09/23  Withdrawal Symptoms:  (none identified)  Addictions:  (adderall abuse)   Past diagnosis:  Bipolar Disorder, Schizophrenia, Substance Use   Disorder, Personality Disorder, Suicide attempt(s), PTSD, Anxiety   Disorder, ADHD  Family history:  No known history of mental health or chemical   health concerns  Past treatment:  Individual therapy, Case management, Civil   Commitment, Day Treatment, Inpatient Hospitalization, Primary   Care, Supportive Living Environment (group home, retirement house,   etc)  Details of most recent treatment:  Pt was most recently IP  at   [facility] from 5/24/23-6/9/23 for SI. Currently resides at a group   home. Has PCP and psychiatry. Pt is currently under MI commitment   through [facility]. Recently lamictal 25 mg was discontinued   as he was not taking the medication consistently. Per note from   Regine Last (med provider) pt was previously doing well n    lithium 900 mg HS until 3/23 when he decided to stop due to   numerous labs needed. Regine notes that pt started to have   increased ED visits and IP MH after discontinuing lithium. Regine   strongly recommends hospitalization and restarting lithium.     Collateral Information  Is there collateral information: No     Collateral information name, relationship, phone number:  [facility], [phone], attempted to reach - LVM    Attempted to reach patient's mother, Negra [phone], LVM     Risk Assessment  [place] Suicide Severity Rating Scale Full Clinical Version:  Suicidal Ideation  Q1 Wish to be Dead (Lifetime): Yes  Q2 Non-Specific Active Suicidal Thoughts (Lifetime): Yes  3. Active Suicidal Ideation with any Methods (Not Plan) Without   Intent to Act (Lifetime): Yes  Q4 Active Suicidal Ideation with Some Intent to Act, Without   Specific Plan (Lifetime): Yes  Q5 Active Suicidal Ideation with Specific Plan and Intent   (Lifetime): Yes  Q6 Suicide Behavior (Lifetime): yes     Suicidal Behavior (Lifetime)  Actual Attempt (Lifetime): Yes  Total Number of Actual Attempts (Lifetime): 4  Has subject engaged in non-suicidal self-injurious behavior?   (Lifetime): No  Interrupted Attempts (Lifetime): No  Aborted or Self-Interrupted Attempt (Lifetime): No  Preparatory Acts or Behavior (Lifetime): Yes  Total Number of Preparatory Acts (Lifetime): 1  Preparatory Acts or Behavior Description (Lifetime): reports they   have been asking people for weapons    [place] Suicide Severity Rating Scale Recent:   Suicidal Ideation (Recent)  Q1 Wished to be Dead (Past Month): yes  Q2 Suicidal Thoughts (Past Month): yes  Q3 Suicidal Thought Method: no (per notes from psych clinic -   considered suicide by )  Q4 Suicidal Intent without Specific Plan: yes  Q5 Suicide Intent with Specific Plan: no  Level of Risk per Screen: high risk  Intensity of Ideation (Recent)  Most Severe Ideation Rating (Past 1 Month):  5  Frequency (Past 1 Month): Many times each day  Duration (Past 1 Month): More than 8 hours/persistent or   continuous  Controllability (Past 1 Month): Unable to control thoughts  Deterrents (Past 1 Month): Deterrents most likely did not stop   you  Reasons for Ideation (Past 1 Month): Completely to end or stop   the pain (You couldn't go on living with the pain or how you were   feeling)  Suicidal Behavior (Recent)  Actual Attempt (Past 3 Months): No  Total Number of Actual Attempts (Past 3 Months): 0  Actual Attempt Description (Past 3 Months): 0  Has subject engaged in non-suicidal self-injurious behavior?   (Past 3 Months): No  Interrupted Attempts (Past 3 Months): No  Total Number of Interrupted Attempts (Past 3 Months): 0  Interrupted Attempt Description (Past 3 Months): 0  Aborted or Self-Interrupted Attempt (Past 3 Months): No  Total Number of Aborted or Self-Interrupted Attempts (Past 3   Months): 0  Aborted or Self-Interrupted Attempt Description (Past 3 Months):   0  Preparatory Acts or Behavior (Past 3 Months): Yes (pt reports he   has been asking others for weapons)  Total Number of Preparatory Acts (Past 3 Months): 1  Preparatory Acts or Behavior Description (Past 3 Months): asking   people for weapons to hurt himself    Environmental or Psychosocial Events: helplessness/hopelessness  Protective Factors: Protective Factors: strong bond to family   unit, community support, or employment, lives in a responsibly   safe and stable environment, supportive ongoing medical and   mental health care relationships, help seeking    Does the patient have thoughts of harming others? Feels Like   Hurting Others: no  Previous Attempt to Hurt Others: no  Current presentation:  (calm and cooperative.)  Is the patient engaging in sexually inappropriate behavior?: no    Is the patient engaging in sexually inappropriate behavior?  no          Mental Status Exam   Affect: Flat  Appearance: Appropriate  Attention  Span/Concentration: Attentive  Eye Contact: Variable    Fund of Knowledge: Appropriate   Language /Speech Content: Fluent  Language /Speech Volume: Normal  Language /Speech Rate/Productions: Normal  Recent Memory: Intact  Remote Memory: Intact  Mood: Normal  Orientation to Person: Yes   Orientation to Place: Yes  Orientation to Time of Day: Yes  Orientation to Date: Yes     Situation (Do they understand why they are here?): Yes  Psychomotor Behavior: Normal  Thought Content: Suicidal  Thought Form: Intact     Medication  Psychotropic medications: see notes from Regine Carbajal from   today - recently taken off lamictal 25 mg. Was previously doing   well on lithium 900 mg, recommends restarting  Current Facility-Administered Medications   Medication    [START ON 8/11/2023] amLODIPine (NORVASC) tablet 10 mg    [START ON 8/11/2023] amphetamine-dextroamphetamine (ADDERALL XR)   15 mg, amphetamine-dextroamphetamine (ADDERALL XR) 10 mg    [START ON 8/11/2023] busPIRone (BUSPAR) tablet 10 mg    [START ON 8/11/2023] clonazePAM (klonoPIN) tablet 0.5 mg    glucose gel 15-30 g    Or    dextrose 50 % injection 25-50 mL    Or    glucagon injection 1 mg    doxycycline hyclate (VIBRAMYCIN) capsule 100 mg    [START ON 8/11/2023] gabapentin (NEURONTIN) tablet 1,200 mg    [START ON 8/11/2023] insulin glargine (LANTUS PEN) injection 15   Units    [START ON 8/11/2023] omeprazole (PriLOSEC) CR capsule 20 mg    ondansetron (ZOFRAN ODT) ODT tab 4 mg    QUEtiapine (SEROquel) tablet 200 mg    [START ON 8/11/2023] testosterone (ANDROGEL) topical gel 20.25   mg     Current Outpatient Medications   Medication    amLODIPine (NORVASC) 10 MG tablet    [START ON 9/7/2023] amphetamine-dextroamphetamine (ADDERALL XR)   25 MG 24 hr capsule    busPIRone (BUSPAR) 10 MG tablet    clonazePAM (KLONOPIN) 0.5 MG tablet    doxycycline monohydrate (MONODOX) 100 MG capsule    gabapentin (NEURONTIN) 600 MG tablet    insulin glargine (LANTUS PEN) 100 UNIT/ML pen     melatonin 5 MG tablet    omeprazole (PRILOSEC) 20 MG DR capsule    ondansetron (ZOFRAN ODT) 4 MG ODT tab    QUEtiapine (SEROQUEL) 200 MG tablet    Semaglutide, 1 MG/DOSE, (OZEMPIC) 4 MG/3ML pen    testosterone (ANDROGEL 1.62 % PUMP) 20.25 MG/ACT gel    tretinoin (RETIN-A) 0.05 % external cream    ACE/ARB/ARNI NOT PRESCRIBED (INTENTIONAL)    amphetamine-dextroamphetamine (ADDERALL XR) 25 MG 24 hr capsule    [START ON 10/7/2023] amphetamine-dextroamphetamine (ADDERALL XR)   25 MG 24 hr capsule    blood glucose (NO BRAND SPECIFIED) test strip    Continuous Blood Gluc  (FREESTYLE COLTEN 2 READER) ZEINAB    Continuous Blood Gluc Sensor (FREESTYLE COLTEN 2 SENSOR) MISC    thin (NO BRAND SPECIFIED) lancets        Current Care Team  Patient Care Team:  Shanice Singh MD as PCP - General (Family Medicine)  Joana Hagen LICSW as PCP - Mental Health/Behavioral   Medicine ( - Clinical)  Kathie Sandhu MD as MD (Neurology)  Tessa Galvan, RN as Specialty Care Coordinator  Ashu Russell DO as MD (Psychiatry & Neurology -   Neurology)  Becki Avery APRN CNP as Assigned PCP  Desmond West as Specialty Care Coordinator (Plastic Surgery)  Harvey Negron MD as MD (Psychiatry)  Dora Mazariegos, PhD (Student in organized health care   education/training program)  Glenda Juan MD as Resident (Family Medicine)  Ayana  as   Regine Last APRN CNP as Nurse Practitioner (Psychiatry)  Oswaldo Amado MD as MD (Dermatology)  Regine Last APRN CNP as Assigned Behavioral Health Provider  Mark Cleary MD as MD (Dermatology)  Lucie Chan, RN as Specialty Care Coordinator   (Psychiatry)  Ashu Russell DO as MD (Neurology)  Luz Moncada APRN CNP as Assigned Neuroscience Provider  Alice Tubbs [facility] as Pharmacist (Pharmacist)  Mark Cleary MD as Assigned Surgical  Provider  Moriah Rojas [facility] as Pharmacist (Pharmacist)  Moriah Rojas [facility] as Assigned MTM Pharmacist  Leventhal, Thomas Michael, MD as MD (Gastroenterology)  Mark Cleary MD as MD (Dermatology)    Diagnosis  Patient Active Problem List   Diagnosis Code    ADHD (attention deficit hyperactivity disorder) F90.9    Bipolar 1 disorder, manic, mild F31.11    Marijuana abuse F12.10    Polysubstance abuse (H) F19.10    GERD (gastroesophageal reflux disease) K21.9    Tobacco abuse Z72.0    Intractable back pain M54.9    Optic neuritis H46.9    Cauda equina syndrome with neurogenic bladder (H) G83.4    Schizoaffective disorder, bipolar type (H) F25.0    PTSD (post-traumatic stress disorder) F43.10    Anxiety F41.9    Auditory hallucination R44.0    Nephrolithiasis N20.0    Cyst of left ovary N83.202    Borderline personality disorder (H) F60.3    Cannabis use disorder, severe, dependence (H) F12.20    Depression F32.A    Episodic mood disorder (H) F39    History of heroin abuse (H) F11.11    Moderate episode of recurrent major depressive disorder (H)   F33.1    Opioid use disorder, severe, dependence (H) F11.20    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    Nausea R11.0    Overdose T50.901A    Bella (H) F30.9    Urinary retention R33.9    Chronic bilateral low back pain without sciatica M54.50, G89.29    AVA (generalized anxiety disorder) F41.1    Aggressive behavior R46.89    Gender identity disorder F64.9    Lumbosacral radiculopathy at L5 M54.17    DUB (dysfunctional uterine bleeding) N93.8    Seizure-like activity (H) R56.9    Acanthosis nigricans L83    Schizoaffective disorder, chronic condition with acute   exacerbation (H) F25.9    Bipolar affective disorder, mixed, severe, with psychotic   behavior (H) F31.64    Schizophrenia, schizoaffective, chronic with acute exacerbation   (H) F25.9    Akathisia G25.71    Hypertension, unspecified type I10    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 Pt displays the following risk   factors that support IP admission: pt present with SI and intent.   He denies a plan at this time, though reported earlier today to   his psych clinic considering suicide by . Reports SI is   constant and the worst he has ever experienced. Reports he has   been asking others in his life for weapons or means to hurt   himself. Endorses feelings of hopelessness. Per note from Regine Last today (med provider) pt was previously doing well on   lithium 900 mg HS until 3/23 when he decided to stop due to   numerous labs needed. Regine notes that pt started to have   increased ED visits and IP MH after discontinuing lithium. Regine   strongly recommends hospitalization and restarting lithium.Pt is   unable to engage in safety planning to mitigate risk level in a   non-secure setting. Lower levels of care would not be sufficient   in managing the level of risk pt is presenting with. Due to this   IP is the least restrictive option of care for pt. Pt should   remain in IP until deemed safe to return to the community and   engage in OP MH supports. Pt will need assistance establishing OP   MH services prior to discharge.       Imminent risk of harm: Suicidal Behavior  Severe psychiatric, behavioral or other comorbid conditions are   appropriate for management at inpatient mental health as   indicated by at least one of the following: Comorbid substance   use disorder  Severe dysfunction in daily living is present as indicated by at   least one of the  following: Other evidence of severe dysfunction  Situation and expectations are appropriate for inpatient care:   Patient management/treatment at lower level of care is not   feasible or is inappropriate  Inpatient mental health services are necessary to meet patient   needs and at least one of the following: Specific condition   related to admission diagnosis is present and judged likely to   deteriorate in absence of treatment at proposed level of care      Patient coping skills attempted to reduce the crisis:  Pt denies   attempting any use of coping skills prior to arrival to the ED.   They do not feel anything is helpful to provide relief for their   symptoms.    Disposition  Recommended disposition: Inpatient Mental Health        Reviewed case and recommendations with attending provider.   Attending Name: Dr. Hector Mendoza       Attending concurs with disposition: yes       Patient and/or validated legal guardian concurs with disposition:     yes       Final disposition:  inpatient mental health    Legal status on admission: Commitment, 72 Hour Hold    Assessment Details   Total duration spent on the patient case in minutes: 30 min     CPT code(s) utilized: 05203 - Psychotherapy for Crisis - 60   (30-74*) min    MONIQUE Hill, Psychotherapist  [facility] - Triage & Transition Services  Callback: [phone]          CBC with platelets differential     Status: None    Collection Time: 08/10/23 11:45 PM    Narrative    The following orders were created for panel order CBC with platelets differential.  Procedure                               Abnormality         Status                     ---------                               -----------         ------                     CBC with platelets and d...[[id number]]                      Final result                 Please view results for these tests on the individual orders.   Comprehensive metabolic panel     Status: Abnormal    Collection Time: 08/10/23 11:45  "PM   Result Value Ref Range    Sodium 136 136 - 145 mmol/L    Potassium 3.5 3.4 - 5.3 mmol/L    Chloride 101 98 - 107 mmol/L    Carbon Dioxide (CO2) 21 (L) 22 - 29 mmol/L    Anion Gap 14 7 - 15 mmol/L    Urea Nitrogen 12.9 6.0 - 20.0 mg/dL    Creatinine 0.71 0.51 - 1.17 mg/dL    Calcium 9.5 8.6 - 10.0 mg/dL    Glucose 120 (H) 70 - 99 mg/dL    Alkaline Phosphatase 89 35 - 129 U/L     (H) 0 - 45 U/L    ALT 82 (H) 0 - 70 U/L    Protein Total 7.5 6.4 - 8.3 g/dL    Albumin 4.4 3.5 - 5.2 g/dL    Bilirubin Total 0.4 <=1.2 mg/dL    GFR Estimate >90 >60 mL/min/1.73m2    Narrative    The generation of reference intervals for this test is currently based on binary male or female sex. If the electronic health record information indicates another gender identity or if Legal Sex is recorded as \"Unknown\", both male and female reference intervals are provided where applicable, and should be considered according to the individual's appropriate clinical context.   CBC with platelets and differential     Status: None    Collection Time: 08/10/23 11:45 PM   Result Value Ref Range    WBC Count 10.9 4.0 - 11.0 10e3/uL    RBC Count 5.36 3.80 - 5.90 10e6/uL    Hemoglobin 14.7 11.7 - 17.7 g/dL    Hematocrit 43.9 35.0 - 53.0 %    MCV 82 78 - 100 fL    MCH 27.4 26.5 - 33.0 pg    MCHC 33.5 31.5 - 36.5 g/dL    RDW 14.6 10.0 - 15.0 %    Platelet Count 273 150 - 450 10e3/uL    % Neutrophils 61 %    % Lymphocytes 30 %    % Monocytes 6 %    % Eosinophils 2 %    % Basophils 1 %    % Immature Granulocytes 0 %    NRBCs per 100 WBC 0 <1 /100    Absolute Neutrophils 6.7 1.6 - 8.3 10e3/uL    Absolute Lymphocytes 3.2 0.8 - 5.3 10e3/uL    Absolute Monocytes 0.7 0.0 - 1.3 10e3/uL    Absolute Eosinophils 0.3 0.0 - 0.7 10e3/uL    Absolute Basophils 0.1 0.0 - 0.2 10e3/uL    Absolute Immature Granulocytes 0.0 <=0.4 10e3/uL    Absolute NRBCs 0.0 10e3/uL    Narrative    The generation of reference intervals for this test is currently based on binary male " "or female sex. If the electronic health record information indicates another gender identity or if Legal Sex is recorded as \"Unknown\", both male and female reference intervals are provided where applicable, and should be considered according to the individual's appropriate clinical context.   Glucose by meter     Status: Abnormal    Collection Time: 08/11/23  8:37 AM   Result Value Ref Range    GLUCOSE BY METER POCT 163 (H) 70 - 99 mg/dL             MD Alberto Ballard Cara, MD  08/11/23 1234    "

## 2023-08-11 NOTE — PROGRESS NOTES
"Triage & Transition Services, Extended Care     Therapy Progress Note    Patient: Prakash goes by \"Prakash,\" uses he/him pronouns  Date of Service: August 11, 2023  Site of Service: [facility]  Patient was seen in-person.     Presenting problem:   Prakash is followed related to long wait time for admission. Please see initial DEC/[facility] Crisis Assessment completed by MONIQUE Hill on 8/10/2023 for complete assessment information. Notable concerns include suicidal ideation and intent to act on SI without plan.     Individuals Present: Prakash & MONIQUE Lang    Session start: 10:12am  Session end: 10:37am  Session duration in minutes: 25  Session number: 1  Anticipated number of sessions or this episode of care: 1-3  CPT utilized: 48148 - Psychotherapy (with patient) - 30 (16-37*) min    Current Presentation:   Patient was seated in the [facility] milieu upon this writer approaching to check-in. Patient was agreeable to meet in the consult room for privacy. Patient appeared flat, apathetic and depressed. Patient reports he is having constant suicidal thoughts and wants to \"discharge home\". This writer inquired about what his intention would be in discharging and patient indicated his intention would be to \"commit suicide\". This writer asked if patient had any plans for how he would complete suicide and patient indicated \"no\". However, during another part of the conversation when talking about coping tools, patient mentioned going to \"sit outside with my roommate for 3 or 4 hours\" before presenting to the ED and all patient could talk about were his thoughts and figuring out \"how to get weapons\" or other means to \"harm myself\". Patient reports \"I shouldn't have reached out  for help\". This writer inquired who he reached out to prior to presenting to the ED, and patient indicated the RN at his provider's office who encouraged patient to present to the ED for evaluation. Patient also reports increased daily anxiety but " "was unable to identify any triggers. Similarly, patient had little insight into triggers for increased suicidal ideation. Patient reported increased anxiety contributes to increased SI but was unable to identify anything else. Patient denies any AH or VH.    This writer then asked patient about changes in sleep and appetite. Patient reports a reduced appetite and indicated he is not sleeping well. Last night was the \"first time I slept in 4-5 days\". Despite not sleeping for several days, patient does not believe they are experiencing any manic symptoms. Additionally, patient states \"I am not depressed\". Patient shares he feels \"happy\" about the thought of his life ending and feels like he should be allowed to end his life if he wants to. This writer offered psychoeducation regarding why it would not be appropriate for patient to discharge today and discussed status of 72HH.     This writer then asked about medications and effectiveness. Patient reports having a Psychiatrist who prescribes his medications and feels as though his medications \"could be better\". He shared that he and his care team have been working to decreased the amount of medications he takes but now feels like that \"maybe wasn't a good idea\". Patient reports being open to medication changes and believes medication change(s) could be what helps him feel better. Patient reports that during previous [facility] hospitalizations he was primarily manic and being admitted to [facility] allowed patient to \"get some relief\" and \"things get calmed down really nicely\". Although patient identifies as being in a different state of mind than previous hospitalizations, he still sees benefit from being admitted to [facility] and alternatively, continues to request to discharge to complete suicide. Patient also reports having a medical marijuana card and utilizing it for pain management and symptoms of anxiety. Patient indicated it only slightly helps with anxiety symptoms. " "    This writer then discussed supports, distractions and coping tools. Patient identified his parents, roommates and OP providers as supports. When specifically asked about if patient sees a therapist, patient indicated he had a therapist he worked with for \"over 2 years\" who \"used to be my arhms worker\" but indicated his therapist discontinued his OP therapy \"due to lack of engagement\". Patient reports he would need a new therapist.     When discussing distractions and coping tools, patient mentioned that at times \"words will stick out\" in his mind and to distract himself from the anxiety or other MH symptoms, patient will \"spell out the words\" to focus on something other than his own thoughts. Patient did mention sitting outside or spending time with other as coping tools. This writer then inquired about his living environment to determine if there are any stressors adding to patient's current presentation. Patient shared he lives in a \"group home\" and has for the \"past 3 years\". Patient states living there is \"alright\" but feels like he has \"declined in functioning\" from being at he group home.     Overall, patient could not identify any activities that would be beneficial to him currently. Patient was offered a variety of options and supplies but declined. Patient was encouraged to connect with staff if he decides otherwise.      Mental Status Exam:   Appearance: awake, alert  Attitude: cooperative  Eye Contact: poor   Mood: anxious and apathetic  Affect: intensity is blunted and intensity is flat  Speech: clear, coherent  Psychomotor Behavior: no evidence of tardive dyskinesia, dystonia, or tics  Thought Process:   goal oriented towards ending his life  Associations: no loose associations  Thought Content: active suicidal ideation present  Insight: limited  Judgement: limited  Oriented to: time, person, and place  Attention Span and Concentration: intact  Recent and Remote Memory: intact    Diagnosis:   F25 - " Schizoaffective disorder, Bipolar type  F60.3 - Borderline personality disorder    Case Management:   3:54pm - this writer contacted patient's MH  through [facility] ([phone]) and left a VM requesting urgent follow-up to determine if revocation will be pursued based on patient being under MI commitment through [facility]. VM left also provided time that 72HH expires.    Therapeutic Intervention(s):   Provided active listening, unconditional positive regard, and validation. Identified stress relief practices.    Treatment Objective(s) Addressed:   The focus of this session was on rapport building, orienting the patient to therapy, assessing safety, identifying treatment goals, identifying additional supports, and exploring obstacles to safety in the community.     Progress Towards Goals:   Patient reports  the same  symptoms as yesterday when he presented to the ED. Patient is not making progress towards treatment goals as evidenced by desire to discharge home to complete suicide and refusal of life sustaining medication(s) such as insulin.     General Recommendations:   Continue to monitor for harm. Consider: Provide the pt with options to provide a sense of control. Try to tell the pt what they can do instead of what they can't do, Allow family calls/visits, and Listen in a neutral, non-judgmental way. Offer reassurance.    Plan:   Inpatient Mental Health: Patient endorses active suicidal ideation with intent to act. Patient denies having a plan, however, shared he was talking to his roommate about how to get weapons and means to end his life yesterday. Patient lacks judgement and insight into present mental health concerns, as seen by patient stating his desire to discharge home in order to complete suicide. Additionally, patient denies being depressed. Patient has been inconsistent with taking prescribed medications, including life sustaining medication of Insulin. Patient is  being recommended for an IP MH admission for safety, stabilization and medication management. Patient is currently on a 72HH, which reportedly expires on 8/15/2023 at 2305. Patient is also under MI commitment through [facility].     Plan for Care reviewed with Assigned Medical Provider? Yes. Provider, Dr. Dominguez, response: in agreement.     MONIQUE Lang  Licensed Mental Health Professional (LMHP), [facility]  [phone]

## 2023-08-11 NOTE — ED NOTES
"32 yo male received to [facility] 09. Blunted, flat ,sad hopeles , slumped body language. . Unkempt. States he has mnot had his meds since last night . SI- I need someone to help me die. And have been focused on suicide. I am hoping for a Ketamine injection. Notified Pharmacy to reconcile meds . HI-no SIB- no AVH- no  Compliance w/ meds - somewhat. Side Effects of meds - none. Mood \" I FEEL I AM READY TO POP\" . No sleep in 3 days . Relapsed on amphetamines ( not meth) the last 2 days. I have anxiety and am tired of it and life. . 2030 States he wants to leave and wants more done . States he wants a ketamine injection. Informed Dr. Mendoza. Also informed pt the DEC  will need to assess him before any med changes are made. Pt did not want to lie down so dosing in the chair at intervals. Requested MD check her reconciled meds.   "

## 2023-08-11 NOTE — ED NOTES
IP MH Referral Acuity Rating Score (RARS)    LMHP complete at referral to IP MH, with DEC; and, daily while awaiting IP MH placement. Call score to PPS.  CRITERIA SCORING   New 72 HH and Involuntary for IP MH (not adolescent) 0/1   Boarding over 24 hours 0/1   Vulnerable adult at least 55+ with multiple co morbidities; or, Patient age 11 or under 0/1   Suicide ideation without relief of precipitating factors 1/1   Current plan for suicide 1/1   Current plan for homicide 0/1   Imminent risk or actual attempt to seriously harm another without relief of factors precipitating the attempt 0/1   Severe dysfunction in daily living (ex: complete neglect for self care, extreme disruption in vegetative function, extreme deterioration in social interactions) 0/1   Recent (last 2 weeks) or current physical aggression in the ED 0/1   Restraints or seclusion episode in ED 0/1   Verbal aggression, agitation, yelling, etc., while in the ED 0/1   Active psychosis with psychomotor agitation or catatonia 0/1   Need for constant or near constant redirection (from leaving, from others, etc).  0/1   Intrusive or disruptive behaviors 0/1   TOTAL Acuity Total Score: 2

## 2023-08-11 NOTE — TELEPHONE ENCOUNTER
[facility] Access Inpatient Bed Call Log 8/11/2023 1:42 AM    Intake has called facilities that have not updated their bed status within the last 12 hours.    Placement Preference: [facility] is posting 0 beds.              [facility] is posting 0 beds. [phone] -1:43am Per Vidya, they are capped. Call in the AM.            [facility] is posting 0 beds. [phone]             [facility] is posting 0 beds. [phone] - 1:45am Per Lissett, they are capped. Call back in the AM.            [facility] is posting 0 beds. [phone]             [facility] is posting 0 bed. [phone]              [facility] is posting 0 beds. [phone]             [facility] is posting 0 beds. [phone]             [facility] is posting 0 beds. [phone]             [facility] is posting 0 beds. [phone]    Pt remains on work list pending appropriate bed availability

## 2023-08-11 NOTE — CONSULTS
Diagnostic Evaluation Consultation  Crisis Assessment    Patient Name: Ayana Prasad  Age:  33 year old  Legal Sex: female  Gender Identity: transgender male  Pronouns: he/him/his  Race: White  Ethnicity: Not  or   Language: English      Patient was assessed: In person      Patient location: [facility] EMERGENCY DEPARTMENT                             [id number]    Referral Data and Chief Complaint  Ayana Prasad presents to the ED via EMS. Patient is presenting to the ED for the following concerns: Suicidal ideation.   Factors that make the mental health crisis life threatening or complex are:  Pt presents to [facility] ED at the recommendation of their psych provider reporting SI, no plan, but intent. Pt reports this is the strongest these thoughts have ever been and they do not feel they can keep themselves safe.    Informed Consent and Assessment Methods  Explained the crisis assessment process, including applicable information disclosures and limits to confidentiality, assessed understanding of the process, and obtained consent to proceed with the assessment.  Assessment methods included conducting a formal interview with patient, review of medical records, collaboration with medical staff, and obtaining relevant collateral information from family and community providers when available.  : done     Patient response to interventions: acceptance expressed  Coping skills were attempted to reduce the crisis:  Pt denies attempting any use of coping skills prior to arrival to the ED. They do not feel anything is helpful to provide relief for their symptoms.     History of the Crisis   Pt has a hx of schizoaffective disorder, bipolar type, borderline personality disorder, AVA, ADHD, PTSD and polysubstance abuse. Pt currently under stay of commitment through [facility]. Pt had contacted their psych clinic earlier today reporting SI and not knowing what to do. Pt had reported thinking of different  "means to do this all day. Pt clarified that he was not seeking help to get treatment, but rather wanted to know the best way to kill himself. Pt was resistant to going to the hospital during this conversation and had reported considering suicide by  if they were called to respond to the scene. Did eventually agree to go to the ED. At the time of assessment, pt continued to endorse SI, no plan, but intent. Reports this is the strongest these thoughts have ever been and \"I just feel a powerful drive to end my life.\" Reports he has been reaching out to people to get help obtaining weapons. Endorses feeling hopeless. Endorses anhedonia, lack of appetite and reports he has not slept for 4 days. Reports recently relapsing on adderall. Did not use this today and felt he was experiencing some withdrawal symptoms. Pt endorses a hx of AH and VH, but denies any at the time of assessment. Denies HI or recent NSSI.    Brief Psychosocial History  Family:  , Children no  Support System:  Parent(s), Other (specify) (pt reports parents and roommates are supportive)  Employment Status:  unemployed  Source of Income:  unknown  Financial Environmental Concerns:  No concerns identified  Current Hobbies:  other (see comments) (pt denied having any hobbies or interests at the time of assessment)  Barriers in Personal Life:  mental health concerns    Significant Clinical History  Current Anxiety Symptoms:     Current Depression/Trauma:  hoplessness, thoughts of death/suicide  Current Somatic Symptoms:   (none)  Current Psychosis/Thought Disturbance:   (none)  Current Eating Symptoms:  loss of appetite  Chemical Use History:  Alcohol: None  Benzodiazepines: None  Opiates: None  Cocaine: None  Marijuana: None  Other Use: Other (comments) (adderall abuse)  Last Use:: 08/09/23  Withdrawal Symptoms:  (none identified)  Addictions:  (adderall abuse)   Past diagnosis:  Bipolar Disorder, Schizophrenia, Substance Use Disorder, " Personality Disorder, Suicide attempt(s), PTSD, Anxiety Disorder, ADHD  Family history:  No known history of mental health or chemical health concerns  Past treatment:  Individual therapy, Case management, Civil Commitment, Day Treatment, Inpatient Hospitalization, Primary Care, Supportive Living Environment (group home, senior living house, etc)  Details of most recent treatment:  Pt was most recently IP MH at [facility] from 5/24/23-6/9/23 for SI. Currently resides at a group home. Has PCP and psychiatry. Pt is currently under MI commitment through [facility]. Recently lamictal 25 mg was discontinued as he was not taking the medication consistently. Per note from Regine Last (med provider) pt was previously doing well n lithium 900 mg HS until 3/23 when he decided to stop due to numerous labs needed. Regine notes that pt started to have increased ED visits and IP MH after discontinuing lithium. Regine strongly recommends hospitalization and restarting lithium.     Collateral Information  Is there collateral information: No     Collateral information name, relationship, phone number:  [facility], [phone], attempted to reach - LVM    Attempted to reach patient's motherNegra [phone], LVM     Risk Assessment  [place] Suicide Severity Rating Scale Full Clinical Version:  Suicidal Ideation  Q1 Wish to be Dead (Lifetime): Yes  Q2 Non-Specific Active Suicidal Thoughts (Lifetime): Yes  3. Active Suicidal Ideation with any Methods (Not Plan) Without Intent to Act (Lifetime): Yes  Q4 Active Suicidal Ideation with Some Intent to Act, Without Specific Plan (Lifetime): Yes  Q5 Active Suicidal Ideation with Specific Plan and Intent (Lifetime): Yes  Q6 Suicide Behavior (Lifetime): yes     Suicidal Behavior (Lifetime)  Actual Attempt (Lifetime): Yes  Total Number of Actual Attempts (Lifetime): 4  Has subject engaged in non-suicidal self-injurious behavior? (Lifetime): No  Interrupted Attempts (Lifetime):  No  Aborted or Self-Interrupted Attempt (Lifetime): No  Preparatory Acts or Behavior (Lifetime): Yes  Total Number of Preparatory Acts (Lifetime): 1  Preparatory Acts or Behavior Description (Lifetime): reports they have been asking people for weapons    [place] Suicide Severity Rating Scale Recent:   Suicidal Ideation (Recent)  Q1 Wished to be Dead (Past Month): yes  Q2 Suicidal Thoughts (Past Month): yes  Q3 Suicidal Thought Method: no (per notes from psych clinic - considered suicide by )  Q4 Suicidal Intent without Specific Plan: yes  Q5 Suicide Intent with Specific Plan: no  Level of Risk per Screen: high risk  Intensity of Ideation (Recent)  Most Severe Ideation Rating (Past 1 Month): 5  Frequency (Past 1 Month): Many times each day  Duration (Past 1 Month): More than 8 hours/persistent or continuous  Controllability (Past 1 Month): Unable to control thoughts  Deterrents (Past 1 Month): Deterrents most likely did not stop you  Reasons for Ideation (Past 1 Month): Completely to end or stop the pain (You couldn't go on living with the pain or how you were feeling)  Suicidal Behavior (Recent)  Actual Attempt (Past 3 Months): No  Total Number of Actual Attempts (Past 3 Months): 0  Actual Attempt Description (Past 3 Months): 0  Has subject engaged in non-suicidal self-injurious behavior? (Past 3 Months): No  Interrupted Attempts (Past 3 Months): No  Total Number of Interrupted Attempts (Past 3 Months): 0  Interrupted Attempt Description (Past 3 Months): 0  Aborted or Self-Interrupted Attempt (Past 3 Months): No  Total Number of Aborted or Self-Interrupted Attempts (Past 3 Months): 0  Aborted or Self-Interrupted Attempt Description (Past 3 Months): 0  Preparatory Acts or Behavior (Past 3 Months): Yes (pt reports he has been asking others for weapons)  Total Number of Preparatory Acts (Past 3 Months): 1  Preparatory Acts or Behavior Description (Past 3 Months): asking people for weapons to hurt  himself    Environmental or Psychosocial Events: helplessness/hopelessness  Protective Factors: Protective Factors: strong bond to family unit, community support, or employment, lives in a responsibly safe and stable environment, supportive ongoing medical and mental health care relationships, help seeking    Does the patient have thoughts of harming others? Feels Like Hurting Others: no  Previous Attempt to Hurt Others: no  Current presentation:  (calm and cooperative.)  Is the patient engaging in sexually inappropriate behavior?: no    Is the patient engaging in sexually inappropriate behavior?  no        Mental Status Exam   Affect: Flat  Appearance: Appropriate  Attention Span/Concentration: Attentive  Eye Contact: Variable    Fund of Knowledge: Appropriate   Language /Speech Content: Fluent  Language /Speech Volume: Normal  Language /Speech Rate/Productions: Normal  Recent Memory: Intact  Remote Memory: Intact  Mood: Normal  Orientation to Person: Yes   Orientation to Place: Yes  Orientation to Time of Day: Yes  Orientation to Date: Yes     Situation (Do they understand why they are here?): Yes  Psychomotor Behavior: Normal  Thought Content: Suicidal  Thought Form: Intact     Medication  Psychotropic medications: see notes from Regine Carbajal from today - recently taken off lamictal 25 mg. Was previously doing well on lithium 900 mg, recommends restarting  Current Facility-Administered Medications   Medication    [START ON 8/11/2023] amLODIPine (NORVASC) tablet 10 mg    [START ON 8/11/2023] amphetamine-dextroamphetamine (ADDERALL XR) 15 mg, amphetamine-dextroamphetamine (ADDERALL XR) 10 mg    [START ON 8/11/2023] busPIRone (BUSPAR) tablet 10 mg    [START ON 8/11/2023] clonazePAM (klonoPIN) tablet 0.5 mg    glucose gel 15-30 g    Or    dextrose 50 % injection 25-50 mL    Or    glucagon injection 1 mg    doxycycline hyclate (VIBRAMYCIN) capsule 100 mg    [START ON 8/11/2023] gabapentin (NEURONTIN) tablet 1,200 mg     [START ON 8/11/2023] insulin glargine (LANTUS PEN) injection 15 Units    [START ON 8/11/2023] omeprazole (PriLOSEC) CR capsule 20 mg    ondansetron (ZOFRAN ODT) ODT tab 4 mg    QUEtiapine (SEROquel) tablet 200 mg    [START ON 8/11/2023] testosterone (ANDROGEL) topical gel 20.25 mg     Current Outpatient Medications   Medication    amLODIPine (NORVASC) 10 MG tablet    [START ON 9/7/2023] amphetamine-dextroamphetamine (ADDERALL XR) 25 MG 24 hr capsule    busPIRone (BUSPAR) 10 MG tablet    clonazePAM (KLONOPIN) 0.5 MG tablet    doxycycline monohydrate (MONODOX) 100 MG capsule    gabapentin (NEURONTIN) 600 MG tablet    insulin glargine (LANTUS PEN) 100 UNIT/ML pen    melatonin 5 MG tablet    omeprazole (PRILOSEC) 20 MG DR capsule    ondansetron (ZOFRAN ODT) 4 MG ODT tab    QUEtiapine (SEROQUEL) 200 MG tablet    Semaglutide, 1 MG/DOSE, (OZEMPIC) 4 MG/3ML pen    testosterone (ANDROGEL 1.62 % PUMP) 20.25 MG/ACT gel    tretinoin (RETIN-A) 0.05 % external cream    ACE/ARB/ARNI NOT PRESCRIBED (INTENTIONAL)    amphetamine-dextroamphetamine (ADDERALL XR) 25 MG 24 hr capsule    [START ON 10/7/2023] amphetamine-dextroamphetamine (ADDERALL XR) 25 MG 24 hr capsule    blood glucose (NO BRAND SPECIFIED) test strip    Continuous Blood Gluc  (FREESTYLE COLTEN 2 READER) ZEINAB    Continuous Blood Gluc Sensor (FREESTYLE COLTEN 2 SENSOR) MISC    thin (NO BRAND SPECIFIED) lancets        Current Care Team  Patient Care Team:  Shanice Singh MD as PCP - General (Family Medicine)  Joana Hagen LICSW as PCP - Mental Health/Behavioral Medicine ( - Clinical)  Kathie Sandhu MD as MD (Neurology)  Tessa Galvan, RN as Specialty Care Coordinator  Ashu Russell DO as MD (Psychiatry & Neurology - Neurology)  Becki Avery APRN CNP as Assigned PCP  Desmond West as Specialty Care Coordinator (Plastic Surgery)  Harvey Negron MD as MD (Psychiatry)  Dora Mazariegos, PhD  (Student in organized health care education/training program)  Glenda Juan MD as Resident (Family Medicine)  Ayana  as   Regine Last APRN CNP as Nurse Practitioner (Psychiatry)  Oswaldo Amado MD as MD (Dermatology)  Regine Last APRN CNP as Assigned Behavioral Health Provider  Mark Cleary MD as MD (Dermatology)  Lucie Chan, RN as Specialty Care Coordinator (Psychiatry)  Ashu Russell DO as MD (Neurology)  Luz Moncada APRN CNP as Assigned Neuroscience Provider  Alice Tubbs [facility] as Pharmacist (Pharmacist)  Mark Cleary MD as Assigned Surgical Provider  Moriah Rojas [facility] as Pharmacist (Pharmacist)  Moriah Rojas [facility] as Assigned MT Pharmacist  Leventhal, Thomas Michael, MD as MD (Gastroenterology)  Mark Cleary MD as MD (Dermatology)    Diagnosis  Patient Active Problem List   Diagnosis Code    ADHD (attention deficit hyperactivity disorder) F90.9    Bipolar 1 disorder, manic, mild F31.11    Marijuana abuse F12.10    Polysubstance abuse (H) F19.10    GERD (gastroesophageal reflux disease) K21.9    Tobacco abuse Z72.0    Intractable back pain M54.9    Optic neuritis H46.9    Cauda equina syndrome with neurogenic bladder (H) G83.4    Schizoaffective disorder, bipolar type (H) F25.0    PTSD (post-traumatic stress disorder) F43.10    Anxiety F41.9    Auditory hallucination R44.0    Nephrolithiasis N20.0    Cyst of left ovary N83.202    Borderline personality disorder (H) F60.3    Cannabis use disorder, severe, dependence (H) F12.20    Depression F32.A    Episodic mood disorder (H) F39    History of heroin abuse (H) F11.11    Moderate episode of recurrent major depressive disorder (H) F33.1    Opioid use disorder, severe, dependence (H) F11.20    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    Nausea R11.0    Overdose T50.901A    Bella (H) F30.9    Urinary retention R33.9     Chronic bilateral low back pain without sciatica M54.50, G89.29    AVA (generalized anxiety disorder) F41.1    Aggressive behavior R46.89    Gender identity disorder F64.9    Lumbosacral radiculopathy at L5 M54.17    DUB (dysfunctional uterine bleeding) N93.8    Seizure-like activity (H) R56.9    Acanthosis nigricans L83    Schizoaffective disorder, chronic condition with acute exacerbation (H) F25.9    Bipolar affective disorder, mixed, severe, with psychotic behavior (H) F31.64    Schizophrenia, schizoaffective, chronic with acute exacerbation (H) F25.9    Akathisia G25.71    Hypertension, unspecified type I10    Female-to-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 person Z78.9    Morbid obesity (H) E66.01    Diabetes mellitus, type 2 (H) E11.9    Suicidal ideation R45.851       Primary Problem This Admission  Active Hospital Problems    *Schizoaffective disorder, bipolar type (H)      Borderline personality disorder (H)      Clinical Summary and Substantiation of Recommendations   It is the recommendation of this clinician that pt admit to IP MH for safety and stabilization. Pt displays the following risk factors that support IP admission: pt present with SI and intent. He denies a plan at this time, though reported earlier today to his psych clinic considering suicide by . Reports SI is constant and the worst he has ever experienced. Reports he has been asking others in his life for weapons or means to hurt himself. Endorses feelings of hopelessness. Per note from Regine Last today (med provider) pt was previously doing well on lithium 900 mg HS until 3/23 when he decided to stop due to numerous labs needed. Regine notes that pt started to have increased ED visits and IP MH after discontinuing lithium. Regine strongly recommends hospitalization and restarting lithium.Pt is unable to engage in safety planning to mitigate risk level in a non-secure setting. Lower levels of care would not be sufficient in managing the level of  risk pt is presenting with. Due to this IP is the least restrictive option of care for pt. Pt should remain in IP until deemed safe to return to the community and engage in OP MH supports. Pt will need assistance establishing OP MH services prior to discharge.       Imminent risk of harm: Suicidal Behavior  Severe psychiatric, behavioral or other comorbid conditions are appropriate for management at inpatient mental health as indicated by at least one of the following: Comorbid substance use disorder  Severe dysfunction in daily living is present as indicated by at least one of the following: Other evidence of severe dysfunction  Situation and expectations are appropriate for inpatient care: Patient management/treatment at lower level of care is not feasible or is inappropriate  Inpatient mental health services are necessary to meet patient needs and at least one of the following: Specific condition related to admission diagnosis is present and judged likely to deteriorate in absence of treatment at proposed level of care      Patient coping skills attempted to reduce the crisis:  Pt denies attempting any use of coping skills prior to arrival to the ED. They do not feel anything is helpful to provide relief for their symptoms.    Disposition  Recommended disposition: Inpatient Mental Health        Reviewed case and recommendations with attending provider. Attending Name: Dr. Hector Mendoza       Attending concurs with disposition: yes       Patient and/or validated legal guardian concurs with disposition:   yes       Final disposition:  inpatient mental health    Legal status on admission: Commitment, 72 Hour Hold    Assessment Details   Total duration spent on the patient case in minutes: 30 min     CPT code(s) utilized: 81910 - Psychotherapy for Crisis - 60 (30-74*) min    MONIQUE Hill, Psychotherapist  [facility] - Triage & Transition Services  Callback: [phone]

## 2023-08-11 NOTE — PHARMACY-ADMISSION MEDICATION HISTORY
Pharmacy Intern Admission Medication History    Admission medication history is complete. The information provided in this note is only as accurate as the sources available at the time of the update.    Medication reconciliation/reorder completed by provider prior to medication history? No    Information Source(s): Patient, Hospital records, and CareEverywhere/SureScripts via in-person    Pertinent Information: Per patient, doxycycline monohydrate 100 mg capsule is indicated for acne  Patient recently filled prazosin 1 mg, olanzapine 5 mg, lamotrigine 25 mg, rosuvastatin 10 mg, and clonidine 0.1 mg but reported not taking it.     Changes made to PTA medication list:  Added:   Clonazepam 0.5 mg tablet: take 1 tablet PO daily  Deleted:   Clonidine 0.1 mg tablet  Lamotrigine 25 mg tablet - per patient, does not take anymore  Rosuvastatin 10 mg tablet  PRN:  Artificial saliva  Aspirin-Acetaminophen-Caffeine 250-250-65 mg tablet  Benzol Peroxide 5%  Bisacodyl 10 mg suppository  Miralax   Magnesium hydroxide   Senna-docusate  Changed: None    Allergies reviewed with patient and updates made in EHR: yes    Medication History Completed By: Dom Domingo 8/10/2023 8:25 PM    Prior to Admission medications    Medication Sig Last Dose Taking? Auth Provider Long Term End Date   amLODIPine (NORVASC) 10 MG tablet Take 1 tablet (10 mg) by mouth daily 8/8/2023 Yes Tessa Mendoza MD Yes    amphetamine-dextroamphetamine (ADDERALL XR) 25 MG 24 hr capsule Take 1 capsule (25 mg) by mouth daily for 30 days 8/10/2023 Yes Regine Last APRN CNP  10/7/23   busPIRone (BUSPAR) 10 MG tablet Take 1 tablet (10 mg) by mouth 3 times daily 8/10/2023 Yes Regine Last APRN CNP Yes    clonazePAM (KLONOPIN) 0.5 MG tablet Take 0.5 mg by mouth daily  Yes Unknown, Entered By History No    doxycycline monohydrate (MONODOX) 100 MG capsule Take 1 capsule (100 mg) by mouth 2 times daily 8/10/2023 Yes Tessa Mendoza MD     gabapentin  (NEURONTIN) 600 MG tablet Take 2 tablets (1,200 mg) by mouth 3 times daily 8/10/2023 Yes Tessa Mendoza MD Yes    insulin glargine (LANTUS PEN) 100 UNIT/ML pen Inject 15 Units Subcutaneous every morning (before breakfast) Take 15 units daily. Take half dose, 7 units, on days you do not eat More than a month Yes Naricsa Arreaga PA-C Yes    melatonin 5 MG tablet Take 1 tablet (5 mg) by mouth At Bedtime 8/9/2023 Yes Regine Last APRN CNP     omeprazole (PRILOSEC) 20 MG DR capsule Take 1 capsule (20 mg) by mouth daily 8/10/2023 Yes Tessa Mendoza MD No    ondansetron (ZOFRAN ODT) 4 MG ODT tab Take 1 tablet (4 mg) by mouth daily as needed for nausea 8/9/2023 Yes Tessa Mendoza MD     QUEtiapine (SEROQUEL) 200 MG tablet Take 1 tablet (200 mg) by mouth daily 8/9/2023 Yes Regine Last APRN CNP Yes    Semaglutide, 1 MG/DOSE, (OZEMPIC) 4 MG/3ML pen Inject 1 mg Subcutaneous every 7 days More than a month Yes Narcisa Arreaga PA-C     testosterone (ANDROGEL 1.62 % PUMP) 20.25 MG/ACT gel Place 2 Pump onto the skin daily for 30 days Apply from dispenser to clean, dry, intact skin of the upper arms and shoulders. 8/10/2023 Yes Juana Peters R, DO Yes 8/20/23   tretinoin (RETIN-A) 0.05 % external cream Apply topically At Bedtime Pea-sized amount to whole face 8/9/2023 Yes Tessa Mendoza MD     ACE/ARB/ARNI NOT PRESCRIBED (INTENTIONAL) Please choose reason not prescribed from choices below.   Becki Avery APRN CNP Yes    amphetamine-dextroamphetamine (ADDERALL XR) 25 MG 24 hr capsule Take 1 capsule (25 mg) by mouth daily   Deepak Marinelli MD     amphetamine-dextroamphetamine (ADDERALL XR) 25 MG 24 hr capsule Take 1 capsule (25 mg) by mouth daily for 30 days   Regine Last APRN CNP  11/6/23   blood glucose (NO BRAND SPECIFIED) test strip Use to test blood sugar one times daily and as needed. To accompany: Blood Glucose Monitor Brands: per insurance.   Becki Avery  [place] Quinones CNP     Continuous Blood Gluc  (FREESTYLE COLTEN 2 READER) ZEINAB Use to read blood sugars as per 's instructions.   Emily Esteves PA-C     Continuous Blood Gluc Sensor (FREESTYLE COLTEN 2 SENSOR) [facility] Use to read blood sugars per 's instructions. Change sensor every 14 days.   Emily Esteves PA-C     thin (NO BRAND SPECIFIED) lancets Use with lanceting device to check blood sugars once per day. To accompany: Blood Glucose Monitor Brands: per insurance.   Becki Avery APRN CNP

## 2023-08-11 NOTE — ED NOTES
IP MH Referral Acuity Rating Score (RARS)    LMHP complete at referral to IP MH, with DEC; and, daily while awaiting IP MH placement. Call score to PPS.  CRITERIA SCORING   New 72 HH and Involuntary for IP MH (not adolescent) 1/1   Boarding over 24 hours 0/1   Vulnerable adult at least 55+ with multiple co morbidities; or, Patient age 11 or under 0/1   Suicide ideation without relief of precipitating factors 1/1   Current plan for suicide 1/1   Current plan for homicide 0/1   Imminent risk or actual attempt to seriously harm another without relief of factors precipitating the attempt 0/1   Severe dysfunction in daily living (ex: complete neglect for self care, extreme disruption in vegetative function, extreme deterioration in social interactions) 1/1   Recent (last 2 weeks) or current physical aggression in the ED 0/1   Restraints or seclusion episode in ED 0/1   Verbal aggression, agitation, yelling, etc., while in the ED 0/1   Active psychosis with psychomotor agitation or catatonia 0/1   Need for constant or near constant redirection (from leaving, from others, etc).  0/1   Intrusive or disruptive behaviors 0/1   TOTAL Acuity Total Score: 4     MONIQUE Lang

## 2023-08-11 NOTE — TELEPHONE ENCOUNTER
S: SANDRA HILL  Angi  calling at 10:10pm about a 33 year old female to 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 presenting with SI with a plan.      B: Pt arrived via EMS. Presenting problem, stressors: Pt is a female to 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, goes by Randall.  Using he/him pronouns. Pt reached out to their psychiatrist, reporting SI.  Denies a plan.  Pt denies having a plan but he reported considered suicide by  with the center.  Denies a plan in the ED.     Pt affect in ED: Flat  Pt Dx: Borderline Personality Disorder, schizoaffective type, ADHD, Anxiety  Previous IPMH hx? Yes: Sylvia in May 2023  Pt endorses SI, no plan   Hx of suicide attempt? Yes: Had 4 prior attempts.  Recent one was in 2018.   Pt denies SIB  Pt denies HI   Pt denies hallucinations .   Pt RARS Score: 2    Hx of aggression/violence, sexual offenses, legal concerns, Epic care plan? describe: None  Current concerns for aggression this visit? No  Does pt have a history of Civil Commitment? Yes, most recent commitment yes currently [facility]  Is Pt their own guardian? Yes    Pt is prescribed medication. Is patient medication compliant? Yes  Pt endorses OP services: Psychiatrist and   CD concerns: Actively using/consuming Some adderall abuse  Acute or chronic medical concerns: No  Does Pt present with specific needs, assistive devices, or exclusionary criteria? None      Pt is ambulatory  Pt is able to perform ADLs independently      A: Pt to be reviewed for [facility] admission. Pt is Voluntary  Preferred placement: [facility] for now    COVID Symptoms: No  If yes, COVID test required   Utox: Not ordered, intake to request lab    CMP: Not ordered, intake requested lab  CBC: Not ordered, intake requested lab  HCG: Not ordered, intake to request lab     R: Patient cleared and ready for behavioral bed placement: Yes  Pt placed on [facility] worklist? Yes    There are no approp beds at this time.  Pt remains on waitlist pending appropriate bed availability.

## 2023-08-11 NOTE — PROGRESS NOTES
"                       Triage & Transition Services, Extended Care    Client Name: Ayana \"Prakash\" VINCENT Prasad    Date: August 11, 2023  Service Type:  Group Therapy  Session Start Time:  11:05am    Session End Time: 11:35am  Session Length: 30 minutes  Site Location: [facility]  Attendees: Patient and other group members  Facilitator: MONIQUE Lang     Topic:   Identifying how we can meet the needs of each self-care domain: physical, psychological, emotional, spiritual, personal and professional.    Intervention:    Group process: support, challenge, affirm, psycho-education.     Response:  Patient did participate in group. Behavior in group was appropriate. Patient felt overwhelmed and struggled to fully engage in group.       MONIQUE Lang    "

## 2023-08-12 ENCOUNTER — TELEPHONE (OUTPATIENT)
Dept: BEHAVIORAL HEALTH | Facility: CLINIC | Age: 33
End: 2023-08-12
Payer: MEDICARE

## 2023-08-12 PROCEDURE — 250N000013 HC RX MED GY IP 250 OP 250 PS 637: Performed by: EMERGENCY MEDICINE

## 2023-08-12 PROCEDURE — 250N000013 HC RX MED GY IP 250 OP 250 PS 637: Performed by: FAMILY MEDICINE

## 2023-08-12 RX ORDER — IBUPROFEN 600 MG/1
600 TABLET, FILM COATED ORAL EVERY 8 HOURS PRN
Status: DISCONTINUED | OUTPATIENT
Start: 2023-08-12 | End: 2023-08-17

## 2023-08-12 RX ORDER — OLANZAPINE 5 MG/1
5 TABLET, ORALLY DISINTEGRATING ORAL EVERY 30 MIN PRN
Status: COMPLETED | OUTPATIENT
Start: 2023-08-12 | End: 2023-08-12

## 2023-08-12 RX ORDER — OLANZAPINE 5 MG/1
5-10 TABLET, ORALLY DISINTEGRATING ORAL 2 TIMES DAILY PRN
Status: DISCONTINUED | OUTPATIENT
Start: 2023-08-12 | End: 2023-08-13

## 2023-08-12 RX ADMIN — NICOTINE POLACRILEX 4 MG: 4 GUM, CHEWING BUCCAL at 14:21

## 2023-08-12 RX ADMIN — IBUPROFEN 600 MG: 600 TABLET ORAL at 10:02

## 2023-08-12 RX ADMIN — BUSPIRONE HYDROCHLORIDE 10 MG: 10 TABLET ORAL at 14:21

## 2023-08-12 RX ADMIN — OLANZAPINE 5 MG: 5 TABLET, ORALLY DISINTEGRATING ORAL at 13:37

## 2023-08-12 RX ADMIN — OLANZAPINE 5 MG: 5 TABLET, ORALLY DISINTEGRATING ORAL at 11:55

## 2023-08-12 RX ADMIN — GABAPENTIN 1200 MG: 600 TABLET, FILM COATED ORAL at 07:44

## 2023-08-12 RX ADMIN — CLONAZEPAM 0.5 MG: 0.5 TABLET ORAL at 07:43

## 2023-08-12 RX ADMIN — BUSPIRONE HYDROCHLORIDE 10 MG: 10 TABLET ORAL at 07:43

## 2023-08-12 RX ADMIN — DEXTROAMPHETAMINE SULFATE, DEXTROAMPHETAMINE SACCHARATE, AMPHETAMINE SULFATE AND AMPHETAMINE ASPARTATE 25 MG: 3.75; 3.75; 3.75; 3.75 CAPSULE, EXTENDED RELEASE ORAL at 10:11

## 2023-08-12 RX ADMIN — TESTOSTERONE 20.25 MG: 20.25 GEL TOPICAL at 07:44

## 2023-08-12 RX ADMIN — DOXYCYCLINE HYCLATE 100 MG: 50 CAPSULE ORAL at 20:39

## 2023-08-12 RX ADMIN — GABAPENTIN 1200 MG: 600 TABLET, FILM COATED ORAL at 14:21

## 2023-08-12 RX ADMIN — NICOTINE POLACRILEX 4 MG: 4 GUM, CHEWING BUCCAL at 21:34

## 2023-08-12 RX ADMIN — QUETIAPINE FUMARATE 200 MG: 100 TABLET ORAL at 21:34

## 2023-08-12 RX ADMIN — NICOTINE POLACRILEX 4 MG: 4 GUM, CHEWING BUCCAL at 15:31

## 2023-08-12 RX ADMIN — OLANZAPINE 5 MG: 5 TABLET, ORALLY DISINTEGRATING ORAL at 18:47

## 2023-08-12 RX ADMIN — DOXYCYCLINE HYCLATE 100 MG: 50 CAPSULE ORAL at 07:44

## 2023-08-12 RX ADMIN — NICOTINE POLACRILEX 4 MG: 4 GUM, CHEWING BUCCAL at 10:03

## 2023-08-12 RX ADMIN — NICOTINE POLACRILEX 4 MG: 4 GUM, CHEWING BUCCAL at 18:50

## 2023-08-12 RX ADMIN — NICOTINE POLACRILEX 4 MG: 4 GUM, CHEWING BUCCAL at 07:56

## 2023-08-12 RX ADMIN — BUSPIRONE HYDROCHLORIDE 10 MG: 10 TABLET ORAL at 20:35

## 2023-08-12 RX ADMIN — AMLODIPINE BESYLATE 10 MG: 10 TABLET ORAL at 07:43

## 2023-08-12 RX ADMIN — NICOTINE POLACRILEX 4 MG: 4 GUM, CHEWING BUCCAL at 11:58

## 2023-08-12 RX ADMIN — OMEPRAZOLE 20 MG: 20 CAPSULE, DELAYED RELEASE ORAL at 07:44

## 2023-08-12 RX ADMIN — GABAPENTIN 1200 MG: 600 TABLET, FILM COATED ORAL at 20:35

## 2023-08-12 RX ADMIN — HYDROXYZINE HYDROCHLORIDE 50 MG: 50 TABLET, FILM COATED ORAL at 18:23

## 2023-08-12 ASSESSMENT — ACTIVITIES OF DAILY LIVING (ADL)
ADLS_ACUITY_SCORE: 37

## 2023-08-12 NOTE — ED NOTES
"Pt presents calm, pleasant, and cooperative. Pt reports SI but denies HI, SIB, and hallucinations. Pt reports feeling \"the same\" as yesterday. Pt is reporting anxiety over having quarter-sized blood clots due to having their menstrual cycle as well as abdominal pain as they are unsure if this is normal. Writer explained that this can be normal, however provider in ED will be notified. Pt was very guarded with a flat affect and tends to avoid social contact if possible.  VSS, med compliant, behaviorally in control.   "

## 2023-08-12 NOTE — TELEPHONE ENCOUNTER
R: [place]  Access Inpatient Bed Call Log 8/21/23 @ 7:04 am   Intake has called facilities that have not updated their bed status within the last 12 hours. ([facility]):              [facility] is at capacity.            [facility] is posting 0 beds. [phone]. Per call @ 7:07 am to Perla, they are at cap.   [facility] is posting 0 beds. Negative covid required.               [facility] is posting 0 beds. Negative covid required. [phone]. Per call @ 7:08 am to Nimco, she will ask her nurse to call us back re: bed avail.    [facility] is [facility] 0 beds.      [facility] is posting 0 beds. [phone]        [facility] is posting 0 beds         [facility] is posting 1 bed. Per call at 7:59 am to Dina, they are at cap but we can call back later. Per call at 11:33 am, recording said someone will call back in about 6 minutes. No call received. Author called again at 2:35 pm to Analilia who said they are at cap.     Paged Melissa at 9:44 am  Paged Melissa at 11:24 am  Melissa called at 11:25 am and said he will discuss case with unit staff and will call back.   Per RN on #10, they do not have an appropriate bed avail.  Pt remains on work list until appropriate placement is avail.

## 2023-08-12 NOTE — ED NOTES
Pt calm and cooperative appears flat reported SI with no plan denies HI,AVH,SIB. Contracts for safety. Pt asked for Seroquel for sleep but fell asleep prior to the medication coming. Pt now refuses the rest of his hs medications and is sleeping. Pt behaviorally in control.

## 2023-08-12 NOTE — ED NOTES
No significant event this shift. Patient resting comfortably with eyes closed, respirations unlabored on all safety checks. No indication of pain distress/discomfort.

## 2023-08-12 NOTE — ED PROVIDER NOTES
[facility] ED Mental Health Handoff Note:       Brief HPI:  This is a 33 year old adult signed out to me by the previous provider.  See initial ED Provider note for full details of the presentation.     Home meds reviewed and ordered/administered: Yes    Medically stable for inpatient mental health admission: Yes.    Evaluated by mental health: Yes. The recommendation is for inpatient mental health treatment. Bed search in process    Safety concerns: At the time I received sign out, there were no safety concerns.    Hold Status:  Active Orders   Legal    Emergency Hospitalization Hold (72 Hr Hold)     Frequency: Effective Now     Start Date/Time: 08/10/23 2305      Number of Occurrences: Until Specified       Exam:   Patient Vitals for the past 24 hrs:   BP Temp Temp src Pulse Resp SpO2   08/11/23 0850 131/87 98.2  F (36.8  C) Oral 71 16 96 %       ED Course:    Medications   amLODIPine (NORVASC) tablet 10 mg (10 mg Oral $Given 8/11/23 1115)   amphetamine-dextroamphetamine (ADDERALL XR) 15 mg, amphetamine-dextroamphetamine (ADDERALL XR) 10 mg (0 mg Oral Stopped 8/11/23 1923)   busPIRone (BUSPAR) tablet 10 mg (10 mg Oral Not Given 8/11/23 2205)   clonazePAM (klonoPIN) tablet 0.5 mg (0.5 mg Oral $Given 8/11/23 0851)   doxycycline hyclate (VIBRAMYCIN) capsule 100 mg (100 mg Oral $Given 8/11/23 1941)   gabapentin (NEURONTIN) tablet 1,200 mg (1,200 mg Oral Not Given 8/11/23 2205)   insulin glargine (LANTUS PEN) injection 15 Units (15 Units Subcutaneous Not Given 8/11/23 0730)   omeprazole (PriLOSEC) CR capsule 20 mg (20 mg Oral $Given 8/11/23 1113)   ondansetron (ZOFRAN ODT) ODT tab 4 mg (has no administration in time range)   testosterone (ANDROGEL) topical gel 20.25 mg (20.25 mg Transdermal $Given 8/11/23 0839)   glucose gel 15-30 g (has no administration in time range)     Or   dextrose 50 % injection 25-50 mL (has no administration in time range)     Or   glucagon injection 1 mg (has no administration in time  range)   nicotine polacrilex (NICORETTE) gum 4 mg (4 mg Buccal $Given 8/11/23 2006)   hydrOXYzine (ATARAX) tablet 25 mg ( Oral See Alternative 8/11/23 1941)     Or   hydrOXYzine (ATARAX) tablet 50 mg (50 mg Oral $Given 8/11/23 1941)   QUEtiapine (SEROquel) tablet 200 mg (has no administration in time range)   melatonin tablet 5 mg (5 mg Oral $Given 8/11/23 2006)   QUEtiapine (SEROquel) tablet 50 mg (50 mg Oral Not Given 8/11/23 2239)       There no significant events during my shift.    Patient was signed out to the oncoming provider.    Impression:    ICD-10-CM    1. Borderline personality disorder (H)  F60.3       2. Schizoaffective disorder, chronic condition with acute exacerbation (H)  F25.9       3. PTSD (post-traumatic stress disorder)  F43.10       4. Suicidal ideation  R45.851           Plan:    Awaiting inpatient mental health admission/transfer.          MD Jose J Lopez Jill C, MD  08/12/23 0708

## 2023-08-12 NOTE — TELEPHONE ENCOUNTER
No appropriate beds are currently available within the  system. [facility] @ 1:15AM:       [facility]: @ cap per website. Per [place] @ 01:20, they do not have beds available  [facility]: Per Edith @ 01:18, they only have low acuity beds in [place] (No psychosis, haven, aggression, HI). [place] is outside of [facility] area  [facility]: @ cap per website. Per Job @ 01:20, suggests calling back later this morning  [facility]: @ cap per website  [facility]: @ cap per website  [facility]: @ cap per website    Pt remains on work list until appropriate placement is available

## 2023-08-12 NOTE — TELEPHONE ENCOUNTER
R: [place]  Access Inpatient Bed Call Log 8/11/23 4:48 PM    Intake has called facilities that have not updated the15 bed status within the last 12 hours.                           [facility] is at capacity.            [facility] is posting 0 beds. [phone]. Per call @4:15PM to [facility] no bed availability or pending discharges.   [facility] is posting 0 beds. Negative covid required.               [facility] is posting 0 beds. Negative covid required. [phone]. Per call @4:49 PM no bed availability.   [facility] is posting 0 beds.      [facility] is posting 0 beds. [phone]        [facility] is posting 0 beds         [facility] is posting 1 beds.               Pt remains on the work list pending appropriate bed availability.

## 2023-08-12 NOTE — PROGRESS NOTES
"Triage & Transition Services, Extended Care     Therapy Progress Note    Patient: Prakash goes by \"Prakash,\" uses he/him pronouns  Date of Service: August 12, 2023  Site of Service: [facility]  Patient was seen in-person.     Prakash is followed related to 72 Hour Hold:   . Please see initial [facility] Crisis Assessment completed by Angi Escobar on 8/11 for complete assessment information. Notable concerns include suicidal ideation.     Individuals Present: Prakash & MONIQUE Lepe    Session start: 130pm  Session end: 200pm  Session duration in minutes: 30  Session number: 2  Anticipated number of sessions or this episode of care: 1-4  CPT utilized: 42084 - Psychotherapy (with patient) - 30 (16-37*) min    Current Presentation:   Patient was seated in the [facility] milieu upon this writer approaching to check-in. Patient was agreeable to meet in the consult room for privacy. Patient appeared flat, apathetic and depressed. Patient reports he is having constant suicidal thoughts and wants writer to fix his thoughts for him.   Therapist asked about coping skills and the patient stated he \"has never had any.\" Writer tried to engage the patient in behavioral activation planning and the patient declined.  The patient stated \"I guess I will do the time (IP  Hospitalization) and just... do whatever I want.\" Pt said this time is different than other MH crises because he \"doesn't care about anybody else.\" Writer attempted other interventions for skill building and the patient declined to engage, stating \"I'm suicidal, I'm suicidal.\"     Mental Status Exam:   Appearance: poorly groomed  Attitude: uncooperative  Eye Contact: fair  Mood: depressed  Affect: intensity is flat  Speech: mumbling  Psychomotor Behavior: fidgeting  Thought Process:  disorganized  Associations: no loose associations  Thought Content: active suicidal ideation present, passive suicidal ideation present, no auditory hallucinations present, and no visual hallucinations " present  Insight: partial  Judgement: limited  Oriented to: time, person, and place  Attention Span and Concentration: limited  Recent and Remote Memory: limited    Diagnosis:     F 25.9 Schizoaffective disorder, bipolar type (H)     Bipolar affective disorder, mixed, severe, with psychotic behavior (H) F31.64          Therapeutic Intervention(s):   Provided active listening, unconditional positive regard, and validation. Engaged in cognitive restructuring/ reframing, looked at common cognitive distortions and challenged negative thoughts. Engaged in activity scheduling and behavioral activation, looking at and reviewing the prior week's goals, problem solving any barriers and acknowledging successes, as well as setting new goals. Explored strategies for self-soothing.     Treatment Objective(s) Addressed:   The focus of this session was on safety planning, building distress tolerance, and assessing safety     Progress Towards Goals:   Patient reports worsening symptoms. Patient is not making progress towards treatment goals as evidenced by choosing to not attend group, lack of engagement in exploring skill building, safety planning.     Case Management:   NA  General Recommendations:   Continue to monitor for harm. Consider: Use a positive, direct and calm approach. Pt's tend to match the energy/mood of the staff. Keep focus positive and upbeat, Use clear and concise directions, too many words can be overwhelming, and Provide the pt with options to provide a sense of control. Try to tell the pt what they can do instead of what they can't do    Plan:   Inpatient Mental Health:   Plan remains the same as plan of care.   recommendation of this clinician that pt admit to IP  for safety and stabilization. Pt displays the following risk factors that support IP admission: pt present with SI and intent. He denies a plan at this time, though reported earlier today to his psych clinic considering suicide by . Reports SI  is constant and the worst he has ever experienced. Reports he has been asking others in his life for weapons or means to hurt himself. Endorses feelings of hopelessness. Per note from Regine Last today (med provider) pt was previously doing well on lithium 900 mg HS until 3/23 when he decided to stop due to numerous labs needed. Regine notes that pt started to have increased ED visits and IP MH after discontinuing lithium. Regine strongly recommends hospitalization and restarting lithium.Pt is unable to engage in safety planning to mitigate risk level in a non-secure setting. Lower levels of care would not be sufficient in managing the level of risk pt is presenting with. Due to this IP is the least restrictive option of care for pt. Pt should remain in IP until deemed safe to return to the community and engage in OP MH supports. Pt will need assistance establishing OP MH services prior to discharge.       No Status Change    Legal Status: Legal Status at Admission: Commitment, 72 Hour Hold  72 Hour Hold - Date/Time Initiated: 8/10/2023 at 2305 (11:05 pm)  72 Hour Hold - Date/Time Ends: 8/15/2023 at 2305 (11:05 pm)    Presenting problem:   Suicidal ideation    Plan for Care reviewed with Assigned Medical Provider? No. Provider, Dr. Lux, response: Not contacted, no changes     MONIQUE Lepe   Licensed Mental Health Professional (LMHP), [facility]  [phone]

## 2023-08-12 NOTE — PROGRESS NOTES
Patient was signed out at change of shift, please see initial providers note for full details.  Briefly this is a 33-year-old with history of schizoaffective disorder who is currently in the [facility] awaiting admission.    At 6:30 PM I was called by the patient's nurse who noted that the patient was outside of the room near a computer and had taken the electrical cord from the computer and was trying to wrap it around their neck.  Staff intervened so the patient was unable to succeed in this gesture.  Patient told staff that he is hearing voices telling him to kill himself.      Nursing asked for order for Zyprexa.  I have placed an order for PRN Zyprexa.  1:1 has also been ordered for safety.

## 2023-08-13 PROBLEM — F25.9 SCHIZOAFFECTIVE DISORDER, CHRONIC CONDITION WITH ACUTE EXACERBATION (H): Status: ACTIVE | Noted: 2021-11-29

## 2023-08-13 LAB
GLUCOSE BLDC GLUCOMTR-MCNC: 117 MG/DL (ref 70–99)
GLUCOSE BLDC GLUCOMTR-MCNC: 135 MG/DL (ref 70–99)
GLUCOSE BLDC GLUCOMTR-MCNC: 162 MG/DL (ref 70–99)
GLUCOSE BLDC GLUCOMTR-MCNC: 201 MG/DL (ref 70–99)

## 2023-08-13 PROCEDURE — G0177 OPPS/PHP; TRAIN & EDUC SERV: HCPCS

## 2023-08-13 PROCEDURE — 250N000013 HC RX MED GY IP 250 OP 250 PS 637: Performed by: EMERGENCY MEDICINE

## 2023-08-13 PROCEDURE — 124N000002 HC R&B MH UMMC

## 2023-08-13 PROCEDURE — 99223 1ST HOSP IP/OBS HIGH 75: CPT | Mod: AI | Performed by: PSYCHIATRY & NEUROLOGY

## 2023-08-13 PROCEDURE — 250N000013 HC RX MED GY IP 250 OP 250 PS 637: Performed by: CLINICAL NURSE SPECIALIST

## 2023-08-13 PROCEDURE — 250N000013 HC RX MED GY IP 250 OP 250 PS 637: Performed by: FAMILY MEDICINE

## 2023-08-13 PROCEDURE — 250N000013 HC RX MED GY IP 250 OP 250 PS 637: Performed by: PSYCHIATRY & NEUROLOGY

## 2023-08-13 PROCEDURE — 82962 GLUCOSE BLOOD TEST: CPT

## 2023-08-13 PROCEDURE — 93005 ELECTROCARDIOGRAM TRACING: CPT

## 2023-08-13 PROCEDURE — 93010 ELECTROCARDIOGRAM REPORT: CPT | Performed by: INTERNAL MEDICINE

## 2023-08-13 RX ORDER — OLANZAPINE 5 MG/1
5-10 TABLET, ORALLY DISINTEGRATING ORAL 4 TIMES DAILY PRN
Status: DISCONTINUED | OUTPATIENT
Start: 2023-08-13 | End: 2023-08-15

## 2023-08-13 RX ORDER — DIPHENHYDRAMINE HYDROCHLORIDE, ZINC ACETATE 2; .1 G/100G; G/100G
CREAM TOPICAL 3 TIMES DAILY PRN
Status: DISCONTINUED | OUTPATIENT
Start: 2023-08-13 | End: 2023-08-28 | Stop reason: HOSPADM

## 2023-08-13 RX ORDER — QUETIAPINE FUMARATE 300 MG/1
300 TABLET, FILM COATED ORAL AT BEDTIME
Status: DISCONTINUED | OUTPATIENT
Start: 2023-08-13 | End: 2023-08-14

## 2023-08-13 RX ORDER — NICOTINE 21 MG/24HR
1 PATCH, TRANSDERMAL 24 HOURS TRANSDERMAL DAILY
Status: DISCONTINUED | OUTPATIENT
Start: 2023-08-13 | End: 2023-08-28 | Stop reason: HOSPADM

## 2023-08-13 RX ADMIN — BUSPIRONE HYDROCHLORIDE 10 MG: 10 TABLET ORAL at 08:26

## 2023-08-13 RX ADMIN — NICOTINE POLACRILEX 4 MG: 4 GUM, CHEWING BUCCAL at 08:28

## 2023-08-13 RX ADMIN — DOXYCYCLINE HYCLATE 100 MG: 50 CAPSULE ORAL at 08:25

## 2023-08-13 RX ADMIN — IBUPROFEN 600 MG: 600 TABLET ORAL at 08:28

## 2023-08-13 RX ADMIN — DIPHENHYDRAMINE HYDROCHLORIDE, ZINC ACETATE: 2; .1 CREAM TOPICAL at 20:34

## 2023-08-13 RX ADMIN — NICOTINE POLACRILEX 4 MG: 4 GUM, CHEWING BUCCAL at 14:47

## 2023-08-13 RX ADMIN — CLONAZEPAM 0.5 MG: 0.5 TABLET ORAL at 09:52

## 2023-08-13 RX ADMIN — NICOTINE POLACRILEX 4 MG: 4 GUM, CHEWING BUCCAL at 12:02

## 2023-08-13 RX ADMIN — OMEPRAZOLE 20 MG: 20 CAPSULE, DELAYED RELEASE ORAL at 08:26

## 2023-08-13 RX ADMIN — Medication 5 MG: at 22:11

## 2023-08-13 RX ADMIN — OLANZAPINE 5 MG: 5 TABLET, ORALLY DISINTEGRATING ORAL at 16:24

## 2023-08-13 RX ADMIN — BUSPIRONE HYDROCHLORIDE 10 MG: 10 TABLET ORAL at 13:57

## 2023-08-13 RX ADMIN — BUSPIRONE HYDROCHLORIDE 10 MG: 10 TABLET ORAL at 20:34

## 2023-08-13 RX ADMIN — NICOTINE POLACRILEX 4 MG: 4 GUM, CHEWING BUCCAL at 18:14

## 2023-08-13 RX ADMIN — NICOTINE POLACRILEX 4 MG: 4 GUM, CHEWING BUCCAL at 09:52

## 2023-08-13 RX ADMIN — DEXTROAMPHETAMINE SULFATE, DEXTROAMPHETAMINE SACCHARATE, AMPHETAMINE SULFATE AND AMPHETAMINE ASPARTATE 25 MG: 3.75; 3.75; 3.75; 3.75 CAPSULE, EXTENDED RELEASE ORAL at 08:26

## 2023-08-13 RX ADMIN — NICOTINE 1 PATCH: 21 PATCH, EXTENDED RELEASE TRANSDERMAL at 12:02

## 2023-08-13 RX ADMIN — GABAPENTIN 1200 MG: 600 TABLET, FILM COATED ORAL at 13:57

## 2023-08-13 RX ADMIN — TESTOSTERONE 20.25 MG: 20.25 GEL TOPICAL at 08:27

## 2023-08-13 RX ADMIN — DOXYCYCLINE HYCLATE 100 MG: 50 CAPSULE ORAL at 21:30

## 2023-08-13 RX ADMIN — QUETIAPINE FUMARATE 300 MG: 300 TABLET ORAL at 20:35

## 2023-08-13 RX ADMIN — GABAPENTIN 1200 MG: 600 TABLET, FILM COATED ORAL at 08:25

## 2023-08-13 RX ADMIN — OLANZAPINE 5 MG: 5 TABLET, ORALLY DISINTEGRATING ORAL at 12:02

## 2023-08-13 RX ADMIN — GABAPENTIN 1200 MG: 600 TABLET, FILM COATED ORAL at 20:34

## 2023-08-13 RX ADMIN — AMLODIPINE BESYLATE 10 MG: 10 TABLET ORAL at 08:26

## 2023-08-13 RX ADMIN — NICOTINE POLACRILEX 4 MG: 4 GUM, CHEWING BUCCAL at 16:24

## 2023-08-13 ASSESSMENT — ACTIVITIES OF DAILY LIVING (ADL)
ADLS_ACUITY_SCORE: 42
ADLS_ACUITY_SCORE: 37
HYGIENE/GROOMING: HANDWASHING;SHOWER;INDEPENDENT
ADLS_ACUITY_SCORE: 42
LAUNDRY: UNABLE TO COMPLETE
ORAL_HYGIENE: INDEPENDENT
ADLS_ACUITY_SCORE: 42
DRESS: SCRUBS (BEHAVIORAL HEALTH)
ADLS_ACUITY_SCORE: 37
ADLS_ACUITY_SCORE: 37

## 2023-08-13 ASSESSMENT — LIFESTYLE VARIABLES
SKIP TO QUESTIONS 9-10: 1
AUDIT-C TOTAL SCORE: 0

## 2023-08-13 NOTE — ED NOTES
Remains on 1:1 staff observation for safety. Up x1 briefly to restroom at 0530 and for fluids. Otherwise, Patient observed to be sleeping on all other safety checks at [facility]. Respirations even and unlabored. No visible s/s of pain distress/discomfort. No behavior issues this shift. Staff to continue to assess/monitor.

## 2023-08-13 NOTE — ED NOTES
Pt was noted on the floor in the milieu attempting to wrap a computer cord around his neck. Pt was agreeable to giving cord to staff and continued to sit on the floor. States he has been hearing voices since this morning telling him to kill himself. Denies any visual hallucinations.Pt placed on  1:1 attendant immediately. Zyprexa and hydroxyzine given. Pt is calm and watching movies.

## 2023-08-13 NOTE — ED NOTES
Pt resting in bed 1:1 at bedside. Pt calm watching movies. Pt medication compliant. Still suicidal states he feels less anxious after zyprexa. Pt will be going to st 12 next shift.

## 2023-08-13 NOTE — CARE PLAN
08/13/23 1038   Patient Belongings   Did you bring any home meds/supplements to the hospital?  No   Patient Belongings locker   Belongings Search Yes   Clothing Search Yes   Second Staff Mark Burnett       In locker: green shirt, black shorts, cell phone, vape, lighter, one pair of sandals, black hat    A               Admission:  I am responsible for any personal items that are not sent to the safe or pharmacy.  [facility] is not responsible for loss, theft or damage of any property in my possession.    Signature:  _________________________________ Date: _______  Time: _____                                              Staff Signature:  ____________________________ Date: ________  Time: _____      2nd Staff person, if patient is unable/unwilling to sign:    Signature: ________________________________ Date: ________  Time: _____     Discharge:  [facility] has returned all of my personal belongings:    Signature: _________________________________ Date: ________  Time: _____                                          Staff Signature:  ____________________________ Date: ________  Time: _____
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
## 2023-08-13 NOTE — TELEPHONE ENCOUNTER
Dr Lange accepts pt for unit 32/[facility] at 8:58PM    Pt added to unit queue, unit called with disposition    ED updated with pt placement    Per CRN on unit 32, unit not staffed to take pt on 1:1.  ANS notified.  Pt will remain in queue per ANS Mary Jane

## 2023-08-13 NOTE — CARE PLAN
08/13/23 1818   Group Therapy Session   Group Attendance attended group session   Time Session Began 0415   Time Session Ended 0515   Total Time (minutes) 60   Total # Attendees 4   Group Type psychotherapeutic   Group Topic Covered cognitive therapy techniques;coping skills/lifestyle management;emotions/expression   Group Session Detail Psychoeducation Process Group. Weekly Review worksheet completed. Small wins of the week, grateful for, what's going well, what's not working, tasks in progress and next things to focus on moving forward.   Patient Response/Contribution did not discuss personal experience;did not share thoughts verbally;listened actively   Patient Participation Detail Pt was minimally engaged during this activity.  They listened attentively and answered questions very briefly if asked directly.  Did not want to discuss personal topics, but remained in the group for the entire hour.

## 2023-08-13 NOTE — ED PROVIDER NOTES
[facility] ED Mental Health Handoff Note:       Brief HPI:  This is a 33 year old adult signed out to me by the previous provider.  See initial ED Provider note for full details of the presentation.   Interval history is pertinent for no acute events.  Pending inpatient mental health admission    Home meds reviewed and ordered/administered: Yes    Medically stable for inpatient mental health admission: Yes    Evaluated by mental health: Yes    Safety concerns: At the time I received sign out, there were no safety concerns.    Hold Status:  Active Orders   Legal    Emergency Hospitalization Hold (72 Hr Hold)     Frequency: Effective Now     Start Date/Time: 08/10/23 2305      Number of Occurrences: Until Specified       Exam:   Patient Vitals for the past 24 hrs:   BP Pulse Resp SpO2   08/12/23 2159 123/85 84 18 99 %       GEN: resting in bed, calm  PULM: breathing comfortably, in no respiratory distress  PSYCH: Calm and cooperative, interactive    ED Course:    Medications   amLODIPine (NORVASC) tablet 10 mg (10 mg Oral $Given 8/12/23 0743)   amphetamine-dextroamphetamine (ADDERALL XR) 15 mg, amphetamine-dextroamphetamine (ADDERALL XR) 10 mg (25 mg Oral $New Bag 8/12/23 1011)   busPIRone (BUSPAR) tablet 10 mg (10 mg Oral $Given 8/12/23 2035)   clonazePAM (klonoPIN) tablet 0.5 mg (0.5 mg Oral $Given 8/12/23 0743)   doxycycline hyclate (VIBRAMYCIN) capsule 100 mg (100 mg Oral $Given 8/12/23 2039)   gabapentin (NEURONTIN) tablet 1,200 mg (1,200 mg Oral $Given 8/12/23 2035)   insulin glargine (LANTUS PEN) injection 15 Units (15 Units Subcutaneous Not Given 8/12/23 0752)   omeprazole (PriLOSEC) CR capsule 20 mg (20 mg Oral $Given 8/12/23 0744)   ondansetron (ZOFRAN ODT) ODT tab 4 mg (has no administration in time range)   testosterone (ANDROGEL) topical gel 20.25 mg (20.25 mg Transdermal $Given 8/12/23 0744)   glucose gel 15-30 g (has no administration in time range)     Or   dextrose 50 % injection 25-50 mL (has  no administration in time range)     Or   glucagon injection 1 mg (has no administration in time range)   nicotine polacrilex (NICORETTE) gum 4 mg (4 mg Buccal $Given 8/12/23 2134)   hydrOXYzine (ATARAX) tablet 25 mg ( Oral See Alternative 8/12/23 1823)     Or   hydrOXYzine (ATARAX) tablet 50 mg (50 mg Oral $Given 8/12/23 1823)   QUEtiapine (SEROquel) tablet 200 mg (200 mg Oral $Given 8/12/23 2134)   melatonin tablet 5 mg (5 mg Oral $Given 8/11/23 2006)   ibuprofen (ADVIL/MOTRIN) tablet 600 mg (600 mg Oral $Given 8/12/23 1002)   OLANZapine zydis (zyPREXA) ODT tab 5-10 mg (5 mg Oral $Given 8/12/23 1847)   QUEtiapine (SEROquel) tablet 50 mg (50 mg Oral Not Given 8/11/23 2239)   OLANZapine zydis (zyPREXA) ODT tab 5 mg (5 mg Oral $Given 8/12/23 1337)            There were no significant events during my shift.    Patient was signed out to the oncoming provider.      Impression:    ICD-10-CM    1. Borderline personality disorder (H)  F60.3       2. Schizoaffective disorder, chronic condition with acute exacerbation (H)  F25.9       3. PTSD (post-traumatic stress disorder)  F43.10       4. Suicidal ideation  R45.851           Plan:    Awaiting inpatient mental health admission/transfer.      RESULTS:   No results found for this visit on 08/10/23 (from the past 24 hour(s)).    MD Isabel Agudelo, MD Lauryn  08/13/23 0759

## 2023-08-13 NOTE — ED NOTES
Pt will be transferring to [facility] via transport and . Pt transferring with VSS, med compliant, and behaviorally in control. Pt transferring with all belongings.

## 2023-08-14 LAB
ATRIAL RATE - MUSE: 90 BPM
DIASTOLIC BLOOD PRESSURE - MUSE: NORMAL MMHG
GLUCOSE BLDC GLUCOMTR-MCNC: 130 MG/DL (ref 70–99)
GLUCOSE BLDC GLUCOMTR-MCNC: 132 MG/DL (ref 70–99)
GLUCOSE BLDC GLUCOMTR-MCNC: 151 MG/DL (ref 70–99)
GLUCOSE BLDC GLUCOMTR-MCNC: 214 MG/DL (ref 70–99)
INTERPRETATION ECG - MUSE: NORMAL
P AXIS - MUSE: 39 DEGREES
PR INTERVAL - MUSE: 162 MS
QRS DURATION - MUSE: 100 MS
QT - MUSE: 378 MS
QTC - MUSE: 462 MS
R AXIS - MUSE: 8 DEGREES
SYSTOLIC BLOOD PRESSURE - MUSE: NORMAL MMHG
T AXIS - MUSE: 28 DEGREES
VENTRICULAR RATE- MUSE: 90 BPM

## 2023-08-14 PROCEDURE — 250N000013 HC RX MED GY IP 250 OP 250 PS 637: Performed by: PSYCHIATRY & NEUROLOGY

## 2023-08-14 PROCEDURE — G0177 OPPS/PHP; TRAIN & EDUC SERV: HCPCS

## 2023-08-14 PROCEDURE — 250N000013 HC RX MED GY IP 250 OP 250 PS 637: Performed by: FAMILY MEDICINE

## 2023-08-14 PROCEDURE — 124N000002 HC R&B MH UMMC

## 2023-08-14 PROCEDURE — 250N000013 HC RX MED GY IP 250 OP 250 PS 637: Performed by: EMERGENCY MEDICINE

## 2023-08-14 PROCEDURE — 99233 SBSQ HOSP IP/OBS HIGH 50: CPT | Performed by: PSYCHIATRY & NEUROLOGY

## 2023-08-14 RX ORDER — CLONAZEPAM 0.5 MG/1
0.5 TABLET ORAL DAILY PRN
Status: DISCONTINUED | OUTPATIENT
Start: 2023-08-14 | End: 2023-08-28 | Stop reason: HOSPADM

## 2023-08-14 RX ORDER — QUETIAPINE FUMARATE 300 MG/1
300 TABLET, FILM COATED ORAL AT BEDTIME
Status: COMPLETED | OUTPATIENT
Start: 2023-08-14 | End: 2023-08-14

## 2023-08-14 RX ADMIN — NICOTINE POLACRILEX 4 MG: 4 GUM, CHEWING BUCCAL at 17:56

## 2023-08-14 RX ADMIN — NICOTINE POLACRILEX 4 MG: 4 GUM, CHEWING BUCCAL at 16:33

## 2023-08-14 RX ADMIN — NICOTINE 1 PATCH: 21 PATCH, EXTENDED RELEASE TRANSDERMAL at 10:16

## 2023-08-14 RX ADMIN — NICOTINE POLACRILEX 4 MG: 4 GUM, CHEWING BUCCAL at 10:18

## 2023-08-14 RX ADMIN — BUSPIRONE HYDROCHLORIDE 10 MG: 10 TABLET ORAL at 14:08

## 2023-08-14 RX ADMIN — BUSPIRONE HYDROCHLORIDE 10 MG: 10 TABLET ORAL at 20:12

## 2023-08-14 RX ADMIN — TESTOSTERONE 20.25 MG: 20.25 GEL TOPICAL at 10:07

## 2023-08-14 RX ADMIN — BUSPIRONE HYDROCHLORIDE 10 MG: 10 TABLET ORAL at 10:08

## 2023-08-14 RX ADMIN — OLANZAPINE 10 MG: 5 TABLET, ORALLY DISINTEGRATING ORAL at 16:33

## 2023-08-14 RX ADMIN — NICOTINE POLACRILEX 4 MG: 4 GUM, CHEWING BUCCAL at 20:12

## 2023-08-14 RX ADMIN — QUETIAPINE FUMARATE 300 MG: 300 TABLET ORAL at 20:12

## 2023-08-14 RX ADMIN — DOXYCYCLINE HYCLATE 100 MG: 50 CAPSULE ORAL at 20:11

## 2023-08-14 RX ADMIN — DOXYCYCLINE HYCLATE 100 MG: 50 CAPSULE ORAL at 10:07

## 2023-08-14 RX ADMIN — CLONAZEPAM 0.5 MG: 0.5 TABLET ORAL at 20:12

## 2023-08-14 RX ADMIN — IBUPROFEN 600 MG: 600 TABLET ORAL at 16:47

## 2023-08-14 RX ADMIN — OMEPRAZOLE 20 MG: 20 CAPSULE, DELAYED RELEASE ORAL at 10:08

## 2023-08-14 RX ADMIN — GABAPENTIN 1200 MG: 600 TABLET, FILM COATED ORAL at 14:08

## 2023-08-14 RX ADMIN — NICOTINE POLACRILEX 4 MG: 4 GUM, CHEWING BUCCAL at 12:55

## 2023-08-14 RX ADMIN — AMLODIPINE BESYLATE 10 MG: 10 TABLET ORAL at 10:07

## 2023-08-14 RX ADMIN — GABAPENTIN 1200 MG: 600 TABLET, FILM COATED ORAL at 20:12

## 2023-08-14 RX ADMIN — GABAPENTIN 1200 MG: 600 TABLET, FILM COATED ORAL at 10:08

## 2023-08-14 RX ADMIN — CLONAZEPAM 0.5 MG: 0.5 TABLET ORAL at 10:08

## 2023-08-14 ASSESSMENT — ACTIVITIES OF DAILY LIVING (ADL)
ADLS_ACUITY_SCORE: 42
DRESS: INDEPENDENT
ADLS_ACUITY_SCORE: 42
HYGIENE/GROOMING: INDEPENDENT
ADLS_ACUITY_SCORE: 42
ORAL_HYGIENE: INDEPENDENT
ADLS_ACUITY_SCORE: 42

## 2023-08-14 NOTE — PROGRESS NOTES
"[facility], [place]   Psychiatric Progress Note  Hospital Day: 1        Interim History:   The patient's care was discussed with the treatment team during the daily team meeting and/or staff's chart notes were reviewed.  Staff report patient reported active SI with intent on admission and was placed on SIO. Also noted that patient attempted to strangle himself in the BEC. Staff report evidence of connection seeking behaviors. Patient did not report any acute medical concerns or side effects. No behavioral issues overnight, including violent or aggressive behaviors. Patient did not require seclusion or restraints. Patient is exhibiting signs/sx of psychosis or haven. Patient did endorse suicidal ideation. Patient did not endorse HI. Patient is medication adherent. Patient is attending groups. Patient is not sleeping well. Patient is eating adequately. Patient is not attending to ADLs.    Upon interview, the patient recalled working with writer on station 12 in 5/2023. He said that he began experiencing worsening command AH telling him to harm himself. He shared that his dog, Dong, was a protective factor in the past, \"but now I don't even care anymore.\" He said that he has had chronic command AH at baseline, but believes they worsened due to lack of sleep. Suicidal thoughts began about 6 days ago. Noted that he does not experience SI at baseline. Reported ongoing passive SI, though denied active SI and intent. He said that he will notify staff if feeling unsafe and did not require the current level of monitoring (SIO). He inquired about ECT. We discussed that this would be a consideration, but would likely be premature as he has not trialed mood stabilizers or clozapine. Additionally, he shared that he is \"not depressed,\" but the suicidal thoughts and command AH have become more intense. Cannot identify any prompting factors other than sleep deprivation.     Suicidal ideation: as " above    Homicidal ideation: denies current or recent homicidal ideation or behaviors.    Psychotic symptoms: Patient reports command AH telling him to kill himself, Denies VH, paranoia.      Medication side effects reported: No significant side effects.    Acute medical concerns: none    Other issues reported by patient: Patient had no further questions or concerns.           Medications:       amLODIPine  10 mg Oral Daily     busPIRone  10 mg Oral TID     doxycycline hyclate  100 mg Oral BID     gabapentin  1,200 mg Oral TID     insulin glargine  15 Units Subcutaneous QAM AC     nicotine  1 patch Transdermal Daily     nicotine   Transdermal Q8H     omeprazole  20 mg Oral Daily     QUEtiapine  300 mg Oral At Bedtime     [START ON 8/15/2023] QUEtiapine  350 mg Oral At Bedtime     testosterone  20.25 mg Transdermal Daily          Allergies:     Allergies   Allergen Reactions     Haldol [Haloperidol] Other (See Comments)     Makes patient very angry and anxious     Adhesive Tape Hives     Percocet [Oxycodone-Acetaminophen] Nausea and Vomiting     Prednisone Other (See Comments) and Hives     Suicidal ideation     Risperidone Other (See Comments)     Tramadol Hcl Nausea and Vomiting     Droperidol Anxiety     Seroquel [Quetiapine] Palpitations     Spent 2 weeks in the hospital due to having seroquel, caused palpitations and QT prolongation          Labs:     Recent Results (from the past 24 hour(s))   Glucose by meter    Collection Time: 08/13/23 10:14 PM   Result Value Ref Range    GLUCOSE BY METER POCT 162 (H) 70 - 99 mg/dL   Glucose by meter    Collection Time: 08/14/23  1:56 AM   Result Value Ref Range    GLUCOSE BY METER POCT 132 (H) 70 - 99 mg/dL   Glucose by meter    Collection Time: 08/14/23  8:52 AM   Result Value Ref Range    GLUCOSE BY METER POCT 151 (H) 70 - 99 mg/dL   Glucose by meter    Collection Time: 08/14/23 12:25 PM   Result Value Ref Range    GLUCOSE BY METER POCT 130 (H) 70 - 99 mg/dL   Glucose by  "meter    Collection Time: 08/14/23  5:15 PM   Result Value Ref Range    GLUCOSE BY METER POCT 214 (H) 70 - 99 mg/dL          Psychiatric Examination:     /88   Pulse 99   Temp 98.4  F (36.9  C) (Oral)   Resp 18   Ht 1.676 m (5' 6\")   Wt 105.8 kg (233 lb 4.8 oz)   SpO2 98%   BMI 37.66 kg/m    Weight is 233 lbs 4.8 oz  Body mass index is 37.66 kg/m .    Weight over time:  Vitals:    08/13/23 1812   Weight: 105.8 kg (233 lb 4.8 oz)       Orthostatic Vitals       Most Recent      Sitting Orthostatic /89 08/14 0924    Sitting Orthostatic Pulse (bpm) 94 08/14 0924    Standing Orthostatic /92 08/13 1600            Cardiometabolic risk assessment. 08/14/23      Reviewed patient profile for cardiometabolic risk factors    Date taken /Value  REFERENCE RANGE   Abdominal Obesity  (Waist Circumference)   See nursing flowsheet Women ?35 in (88 cm)   Men ?40 in (102 cm)      Triglycerides  Triglycerides   Date Value Ref Range Status   05/28/2023 240 (H) <150 mg/dL Final   04/28/2021 317 (H) <150 mg/dL Final       ?150 mg/dL (1.7 mmol/L) or current treatment for elevated triglycerides   HDL cholesterol  HDL Cholesterol   Date Value Ref Range Status   04/28/2021 37 (L) >40 mg/dL Final     Direct Measure HDL   Date Value Ref Range Status   05/28/2023 35 (L) >=40 mg/dL Final   ]   Women <50 mg/dL (1.3 mmol/L) in women or current treatment for low HDL cholesterol  Men <40 mg/dL (1 mmol/L) in men or current treatment for low HDL cholesterol     Fasting plasma glucose (FPG) Lab Results   Component Value Date     08/14/2023     05/05/2023     05/05/2023     05/19/2021      FPG ?100 mg/dL (5.6 mmol/L) or treatment for elevated blood glucose   Blood pressure  BP Readings from Last 3 Encounters:   08/14/23 138/88   07/21/23 126/87   06/09/23 133/89    Blood pressure ?130/85 mmHg or treatment for elevated blood pressure   Family History  See family history     Mental Status " Exam:  Appearance: awake, alert  Attitude:  cooperative  Eye Contact:  good  Mood:  depressed  Affect:  mood congruent, blunted  Speech:  clear, coherent  Language: fluent and intact in English  Psychomotor, Gait, Musculoskeletal:  no evidence of tardive dyskinesia, dystonia, or tics  Throught Process:  linear, disorganized and illogical  Associations:  no loose associations  Thought Content:  no evidence of suicidal ideation or homicidal ideation, auditory hallucinations present and no visual hallucinations present  Insight:  fair  Judgement:  fair  Oriented to:  time, person, and place  Attention Span and Concentration:  fair  Recent and Remote Memory:  fair  Fund of Knowledge:  appropriate           Precautions:     Behavioral Orders   Procedures     Code 1 - Restrict to Unit     Routine Programming     As clinically indicated     Self Injury Precaution     Status 15     Every 15 minutes.     Status Individual Observation     Patient SIO status reviewed with team/RN.  Please also refer to RN/team documentation for add'l detail.    -SIO staff to monitor following which have contributed to patient being on SIO:  Active si with intent  -Possible interventions SIO staff could use to support patient's treatment progress:  Close monitoring   -When following observed, team will review discontinuation of SIO:  Absence of active SI and intent for 24 hours     Order Specific Question:   CONTINUOUS 24 hours / day     Answer:   5 feet     Order Specific Question:   Indications for SIO     Answer:   Suicide risk     Suicide precautions     Patients on Suicide Precautions should have a Combination Diet ordered that includes a Diet selection(s) AND a Behavioral Tray selection for Safe Tray - with utensils, or Safe Tray - NO utensils            Diagnoses:     Schizoaffective disorder, chronic condition with acute exacerbation (H)  PTSD (post-traumatic stress disorder)  Borderline personality disorder (H)  Suicidal ideation          Assessment & Plan:     Assessment and hospital summary:  Patient is a 33 year old with past history of schizoaffective disorder, borderline personality disorder and methamphetamine dependence in remission for 7 years who reports increased command auditory hallucinations of God telling him to kill himself.  He has also had 5 nights without sleep and been taking risks and been arrested twice in the last 2 weeks.  He most likely is in a mixed state episode with psychosis.  Dr. Torres stopped Adderall and increased Seroquel to 300mg at bedtime.      Today's Changes:  Increase Seroquel to 350 mg this evening and consider further increases if tolerating  Nutrition consult per pt request  Diabetes education consult per pt request  Discontinued SIO though was informed later this evening that patient had re-emerging active SI with intent, and would not contract for safety  Consider trial of Lithium  Consider ECT consult, though R/B/A would thoroughly need to be reviewed with patient.    Target psychiatric symptoms and interventions:  HOLDING Adderall  Buspar 10 mg TID  Gabapentin 1200 mg TID  Nicotine replacement  Seroquel 300 mg at bedtime --> 350 mg QHS  Risks, benefits, and alternatives discussed at length with patient.     Acute Medical Problems and Treatments:  Acute medical concerns:  - No acute medical concerns    Pertinent labs/imaging:  Reviewed    Behavioral/Psychological/Social:  - Encourage unit programming    Safety:  - Continue precautions as noted above  - Status 15 minute checks  - SIO in place for active SI with intent    Legal Status: 72 hour hold, initiated on 8/10 at 2305. Pt is on stay of commitment.     Disposition Plan   Reason for ongoing admission: poses an imminent risk to self  Discharge location: group home  Discharge Medications: not ordered  Follow-up Appointments: not scheduled    Entered by: Tessa Mendoza MD on 8/14/2023 at 6:16 PM

## 2023-08-14 NOTE — CARE PLAN
"Occupational Therapy     08/14/23 1300   Group Therapy Session   Group Attendance attended group session   Time Session Began 1115   Time Session Ended 1200   Total Time (minutes) 45   Total # Attendees 3-4   Group Type recreation   Group Topic Covered cognitive activities;coping skills/lifestyle management;leisure exploration/use of leisure time;structured socialization   Group Session Detail OT: Education on cognitive wellness and interactive social activity (Tapple) to increase concentration, focus, attention to task/detail, task follow through, memory recall, healthy leisure engagement, coping with stress, heathy distraction engagement, cognitive wellness, social wellness, and social engagement   Patient Response/Contribution cooperative with task;listened actively;other (see comments)  (pleasant; engaged)   Patient Participation Detail Pt reported during check-in they enjoy \"art\" (charcoal landscapes) as a way to support their cognitive wellness. Pt sat among peers to complete presented activity and engaged in reciprocal social interactions with peers. Pt able to follow verbal directions and visual demonstration for novel activity. Pt able to independently identify several unique answers. Pt verbalized understanding of importance of cognitive wellness in a healthy lifestyle.        "

## 2023-08-14 NOTE — PLAN OF CARE
Problem: Adult Behavioral Health Plan of Care  Goal: Adheres to Safety Considerations for Self and Others  Outcome: Progressing  Flowsheets (Taken 8/13/2023 2110)  Adheres to Safety Considerations for Self and Others: making progress toward outcome     Problem: Plan of Care - These are the overarching goals to be used throughout the patient stay.    Goal: Optimal Comfort and Wellbeing  Intervention: Provide Person-Centered Care  Recent Flowsheet Documentation  Taken 8/13/2023 2014 by Jalen Deng RN  Trust Relationship/Rapport:   care explained   choices provided   emotional support provided   empathic listening provided   questions answered   questions encouraged   reassurance provided   thoughts/feelings acknowledged   Goal Outcome Evaluation:    Plan of Care Reviewed With: patient          Patient remains on 1:1 SIO monitoring for suicidal behaviors. Patient had no observed behaviors this evening though still reporting active suicidal thoughts with no current plan or intent to act, agreeable to contract for safety.    Patient in the milieu all evening talking with staff, attending group, and talking on the phone. Patient upon approach flat in affect, calm and cooperative with verbal assessment. Patient reporting continued manic symptoms and psychosis. Patient reporting auditory hallucinations that he experiences coming from the radio and television which he was observed to avoid throughout the evening. Patient reporting increased anxiety related to hospitalization Patient denies VH, depression, or thoughts of harming others. Patient not future oriented and unable to identify goals. Patient did early in the evening reporting high anxiety and agitation, requesting and receiving PRN Zyprexa 5 mg PRN.    Patient cooperative with vital sign assessments which were stable. Patient cooperative with blood glucose assessment though reporting he will not test his glucose or use insulin at home due to fear of needles.  Patient initially stated he did not want to take his HS medications and asked if he could stay awake all night. RN writer educated patient on importance of medication adherence and proper sleep hygiene which he was receptive of and later requested to receive all his HS medications. Patient had no observed or reported side effects this evening. Patient independent with identifying needs and completing ADL's. Patient did report anti itch medication for chigger bites on bilateral feet, with raised red bumps observed by RN writer. On call provider contacted and added PRN Benadryl cream which patient was accepting of.    Patient requesting for meeting with dietician reporting poor appetite and wait loss of 20 pounds in the past month. Patient cooperative with weight assessment and observed to have a weight loss less then 5 lbs since last charted weight on 6/21/23. Patient states he believes it would be helpful and increase his appetite to have more soups and Glucerna shakes instead of solid foods. Dietician orders placed. Patient also requesting for meeting with diabetic educator.

## 2023-08-14 NOTE — PROGRESS NOTES
BG at 0200 = 132.  States feeling fine and declines any prns for anxiety or sleep.    Appeared to sleep a total of 5.5 hours this night shift. Appears comfortable.    Continues on 1:1 staffing for SI.

## 2023-08-14 NOTE — PLAN OF CARE
Assessment/Intervention/Current Symtoms and Care Coordination:  Chart review and met with team, discussed pt progress, symptomology, and response to treatment.  Discussed the discharge plan and any potential impediments to discharge.    -Writer spoke w/MHR-Nadine Damico Mgr Supervisor @[phone] who states she will submit revocation before hold expires.        Discharge Plan or Goal  Back to group home     Barriers to Discharge:  Symptom and medication stabilization     Referral Status:  TBD-DBT      Legal Status:  Committed   County: [facility]   File Number: [id number]   Start and expiration date of commitment:     Contacts:  : Cristy at [facility], [phone] (VAL signed)   [facility]: Deena at [facility], [phone] (VAL signed)   [facility] Director and Nurse: Domenica [phone] (VAL signed)   CADI worker: Pretty Guzman at [facility], [phone] (VAL signed)   Psychotherapist: Joshua at [employer], [phone] (VAL signed)       Upcoming Meetings and Dates/Important Information and next steps:

## 2023-08-14 NOTE — PLAN OF CARE
"Pt was withdrawn to self but was present in the milieu. Endorsed back pain but refused intervention.    Blood sugar:151 and 130- refused his Latus and no sign of hyperglycemic noted or reported.    Pt denies delusions but endorsed auditory hallucination with voices telling him to kill himself by overdosing on drugs. However, verbalized not to hurt himself while in the hospital. No harm or assault  to self and others this shift.     Wants adderall added to his medication list, stated that he was on it the last time he was here. Informed to speak with the provider when she rounds but pt is not receptive to that but was calm. Message sent to the provider and she replied that we are going to hold off on it for now due to severity of psychotic symptoms.  Pt was updated of the provider reply and he verbalized \"Okay.\" Pt received his schedule Buspar and Gabapentin and remain calm this shift.              Problem: Suicidal Behavior  Goal: Suicidal Behavior is Absent or Managed  Outcome: Progressing     Problem: Suicidal Behavior  Goal: Suicidal Behavior is Absent or Managed  Outcome: Progressing   Goal Outcome Evaluation:    Plan of Care Reviewed With: patient                   "

## 2023-08-14 NOTE — PLAN OF CARE
Goal Outcome Evaluation:      Plan of Care Reviewed With: patient    Overall Patient Progress: improvingOverall Patient Progress: improving    Outcome Evaluation: Providing Glucerna daily    Lissett GUSMAN  Mental Health Pager (M-F): [phone]  On Call Pager (weekends only): [phone]

## 2023-08-14 NOTE — PROGRESS NOTES
"CLINICAL NUTRITION SERVICES - ASSESSMENT NOTE     Nutrition Prescription    RECOMMENDATIONS FOR MDs/PROVIDERS TO ORDER:  N/A    Malnutrition Status:    Does not meet malnutrition criteria    Recommendations already ordered by Registered Dietitian (RD):  Glucerna daily, pt request    Future/Additional Recommendations:  Monitor intakes and supplement tolerance     REASON FOR ASSESSMENT  Ayana Prasad \"Prakash\" is a 33 year old transgender adult male assessed by the dietitian for positive Admission Nutrition Risk Screen: 20 lbs over 4 weeks. Per EMR review, report of weight loss is not supported. Pt requests Glucerna protein shakes. Patient states he believes it would be helpful and increase his appetite to have more soups and Glucerna shakes instead of solid foods     Reason for admission:  suicidal ideation   PMH: schizoaffective/bipolar disorder, SI with 4 suicide attempts, anxiety, ADHD, polysubstance abuse, HTN, T2DM, TBI,  and gender dysphoria     NUTRITION HISTORY  Pt reports decreased appetite for a couple of months and consuming only 1 meal per day. Pt denies food insecurity. Pt reports being on  hormone therapy x 3 years.     CURRENT NUTRITION ORDERS  Diet: Regular  Intake/Tolerance: No intakes recorded    LABS  Labs reviewed  Last A1c 9.1 5/05/23    MEDICATIONS  Medications reviewed    ANTHROPOMETRICS  Height: 167.6 cm (5' 6\"[From 7/21/23[  Most Recent Weight: 105.8 kg (233 lb 4.8 oz)    IBW: 64.5 kg (using male criteria)  Body mass index is 37.66 kg/m .  BMI: Obesity Grade II BMI 35-39.9  Weight History:  Pt reports  lbs  Wt Readings from Last 15 Encounters:   08/13/23 105.8 kg (233 lb 4.8 oz)   07/21/23 108 kg (238 lb)   06/04/23 112.5 kg (248 lb)   05/22/23 108.9 kg (240 lb)   12/27/22 108.9 kg (240 lb)   12/15/22 106.9 kg (235 lb 9.6 oz)   08/29/22 105.7 kg (233 lb)   07/18/22 104.3 kg (230 lb)   06/10/22 104.3 kg (230 lb)   05/05/22 104.3 kg (230 lb)   05/04/22 104.3 kg (230 lb)   01/16/22 104.3 " kg (230 lb)   12/15/21 105.8 kg (233 lb 3.2 oz)   10/06/21 103.9 kg (229 lb)   09/14/21 106.6 kg (235 lb)     Dosing Weight: 105.8 kg (total kcals), 64.5 kg (protein)    ASSESSED NUTRITION NEEDS  Estimated Energy Needs: 1480 - 1800 kcals/day (14 - 17 kcals/kg)  Justification: Obese  Estimated Protein Needs: 97 - 129 grams protein/day (1.5 - 2 grams of pro/kg)  Justification: Obesity guidelines  Estimated Fluid Needs: 1480 - 1800 mL/day (1 mL/kcal)   Justification: Maintenance    PHYSICAL FINDINGS  See malnutrition section below.  Moiz Vazquez     MALNUTRITION  % Intake: < 75% for >/= 3 months (moderate)  % Weight Loss: Weight loss does not meet criteria  Subcutaneous Fat Loss: None observed  Muscle Loss: None observed  Fluid Accumulation/Edema: None noted  Malnutrition Diagnosis: Patient does not meet two of the established criteria necessary for diagnosing malnutrition    NUTRITION DIAGNOSIS  Predicted inadequate nutrient intake related to decreased appetite as evidenced by pt self report      INTERVENTIONS  Implementation  Medical food supplement therapy     Goals  Patient to consume % of nutritionally adequate meal trays TID, or the equivalent with supplements/snacks.     Monitoring/Evaluation  Progress toward goals will be monitored and evaluated per protocol.  Lissett Ramires RDN LD  Mental Health Pager (M-F): [phone]  On Call Pager (weekends only): [phone]

## 2023-08-15 ENCOUNTER — TELEPHONE (OUTPATIENT)
Dept: BEHAVIORAL HEALTH | Facility: CLINIC | Age: 33
End: 2023-08-15
Payer: MEDICARE

## 2023-08-15 ENCOUNTER — MEDICAL CORRESPONDENCE (OUTPATIENT)
Dept: HEALTH INFORMATION MANAGEMENT | Facility: CLINIC | Age: 33
End: 2023-08-15
Payer: MEDICARE

## 2023-08-15 PROBLEM — F06.30 MOOD DISORDER DUE TO A GENERAL MEDICAL CONDITION: Status: ACTIVE | Noted: 2023-08-15

## 2023-08-15 LAB
GLUCOSE BLDC GLUCOMTR-MCNC: 129 MG/DL (ref 70–99)
GLUCOSE BLDC GLUCOMTR-MCNC: 164 MG/DL (ref 70–99)
GLUCOSE BLDC GLUCOMTR-MCNC: 179 MG/DL (ref 70–99)
GLUCOSE BLDC GLUCOMTR-MCNC: 199 MG/DL (ref 70–99)
HBA1C MFR BLD: 8.8 %
TROPONIN T SERPL HS-MCNC: <6 NG/L

## 2023-08-15 PROCEDURE — 250N000013 HC RX MED GY IP 250 OP 250 PS 637: Performed by: FAMILY MEDICINE

## 2023-08-15 PROCEDURE — 250N000013 HC RX MED GY IP 250 OP 250 PS 637: Performed by: PSYCHIATRY & NEUROLOGY

## 2023-08-15 PROCEDURE — 36415 COLL VENOUS BLD VENIPUNCTURE: CPT | Performed by: PHYSICIAN ASSISTANT

## 2023-08-15 PROCEDURE — 84484 ASSAY OF TROPONIN QUANT: CPT | Performed by: PHYSICIAN ASSISTANT

## 2023-08-15 PROCEDURE — 99233 SBSQ HOSP IP/OBS HIGH 50: CPT | Performed by: PSYCHIATRY & NEUROLOGY

## 2023-08-15 PROCEDURE — 99222 1ST HOSP IP/OBS MODERATE 55: CPT | Mod: AI | Performed by: PHYSICIAN ASSISTANT

## 2023-08-15 PROCEDURE — 124N000002 HC R&B MH UMMC

## 2023-08-15 PROCEDURE — 250N000013 HC RX MED GY IP 250 OP 250 PS 637: Performed by: EMERGENCY MEDICINE

## 2023-08-15 PROCEDURE — G0177 OPPS/PHP; TRAIN & EDUC SERV: HCPCS

## 2023-08-15 RX ORDER — OLANZAPINE 5 MG/1
5-10 TABLET, ORALLY DISINTEGRATING ORAL 3 TIMES DAILY PRN
Status: DISCONTINUED | OUTPATIENT
Start: 2023-08-15 | End: 2023-08-28 | Stop reason: HOSPADM

## 2023-08-15 RX ADMIN — IBUPROFEN 600 MG: 600 TABLET ORAL at 09:38

## 2023-08-15 RX ADMIN — NICOTINE POLACRILEX 4 MG: 4 GUM, CHEWING BUCCAL at 21:01

## 2023-08-15 RX ADMIN — NICOTINE POLACRILEX 4 MG: 4 GUM, CHEWING BUCCAL at 14:59

## 2023-08-15 RX ADMIN — OLANZAPINE 5 MG: 5 TABLET, ORALLY DISINTEGRATING ORAL at 20:10

## 2023-08-15 RX ADMIN — CLONAZEPAM 0.5 MG: 0.5 TABLET ORAL at 13:24

## 2023-08-15 RX ADMIN — NICOTINE POLACRILEX 4 MG: 4 GUM, CHEWING BUCCAL at 10:15

## 2023-08-15 RX ADMIN — OLANZAPINE 10 MG: 5 TABLET, ORALLY DISINTEGRATING ORAL at 17:21

## 2023-08-15 RX ADMIN — DOXYCYCLINE HYCLATE 100 MG: 50 CAPSULE ORAL at 20:09

## 2023-08-15 RX ADMIN — OMEPRAZOLE 20 MG: 20 CAPSULE, DELAYED RELEASE ORAL at 08:18

## 2023-08-15 RX ADMIN — IBUPROFEN 600 MG: 600 TABLET ORAL at 21:00

## 2023-08-15 RX ADMIN — NICOTINE POLACRILEX 4 MG: 4 GUM, CHEWING BUCCAL at 08:18

## 2023-08-15 RX ADMIN — QUETIAPINE FUMARATE 350 MG: 300 TABLET ORAL at 21:01

## 2023-08-15 RX ADMIN — NICOTINE POLACRILEX 4 MG: 4 GUM, CHEWING BUCCAL at 16:18

## 2023-08-15 RX ADMIN — BUSPIRONE HYDROCHLORIDE 10 MG: 10 TABLET ORAL at 08:18

## 2023-08-15 RX ADMIN — TESTOSTERONE 20.25 MG: 20.25 GEL TOPICAL at 08:18

## 2023-08-15 RX ADMIN — GABAPENTIN 1200 MG: 600 TABLET, FILM COATED ORAL at 13:24

## 2023-08-15 RX ADMIN — NICOTINE 1 PATCH: 21 PATCH, EXTENDED RELEASE TRANSDERMAL at 08:27

## 2023-08-15 RX ADMIN — Medication 5 MG: at 21:01

## 2023-08-15 RX ADMIN — GABAPENTIN 1200 MG: 600 TABLET, FILM COATED ORAL at 08:17

## 2023-08-15 RX ADMIN — HYDROXYZINE HYDROCHLORIDE 50 MG: 50 TABLET, FILM COATED ORAL at 16:27

## 2023-08-15 RX ADMIN — DOXYCYCLINE HYCLATE 100 MG: 50 CAPSULE ORAL at 08:17

## 2023-08-15 RX ADMIN — NICOTINE POLACRILEX 4 MG: 4 GUM, CHEWING BUCCAL at 12:41

## 2023-08-15 RX ADMIN — BUSPIRONE HYDROCHLORIDE 10 MG: 10 TABLET ORAL at 13:24

## 2023-08-15 RX ADMIN — GABAPENTIN 1200 MG: 600 TABLET, FILM COATED ORAL at 18:34

## 2023-08-15 RX ADMIN — AMLODIPINE BESYLATE 10 MG: 10 TABLET ORAL at 09:38

## 2023-08-15 RX ADMIN — BUSPIRONE HYDROCHLORIDE 10 MG: 10 TABLET ORAL at 18:31

## 2023-08-15 RX ADMIN — NICOTINE POLACRILEX 4 MG: 4 GUM, CHEWING BUCCAL at 18:35

## 2023-08-15 ASSESSMENT — ACTIVITIES OF DAILY LIVING (ADL)
ADLS_ACUITY_SCORE: 42
ORAL_HYGIENE: INDEPENDENT
ADLS_ACUITY_SCORE: 42
ADLS_ACUITY_SCORE: 42
ORAL_HYGIENE: INDEPENDENT
ADLS_ACUITY_SCORE: 42
HYGIENE/GROOMING: INDEPENDENT
ADLS_ACUITY_SCORE: 42
LAUNDRY: UNABLE TO COMPLETE
ADLS_ACUITY_SCORE: 42
HYGIENE/GROOMING: HANDWASHING;INDEPENDENT
DRESS: SCRUBS (BEHAVIORAL HEALTH)
ADLS_ACUITY_SCORE: 42
DRESS: INDEPENDENT

## 2023-08-15 NOTE — CARE PLAN
08/15/23 1432   Group Therapy Session   Group Attendance attended group session   Time Session Began 1015   Time Session Ended 1100   Total Time (minutes) 45   Total # Attendees 1   Group Type psychoeducation;life skill;community   Group Topic Covered coping skills/lifestyle management;emotions/expression;relationship;structured socialization   Group Session Detail Topic Discussion Cards: Group activity to encourage connection, communication, and self-reflection through discussion topic cards.    Patient Participation Detail Pt participated in a group activity promoting connection and self-reflection using discussion topic cards; pt was joined by SIO. Pt appeared engaged and focused on the activity, and was open to taking turns in sharing insight and personal experiences in response to the discussion questions. Pt reported feeling relaxed and laid back. Pt shared about their current struggles with mental health, as well as their struggles from childhood.

## 2023-08-15 NOTE — CONSULTS
"[facility]  Consult Note - Hospitalist Service  Date of Admission:  8/10/2023  Consult Requested by: Cheyenne Andrade MD  Reason for Consult: diabetic, refusing insulin, please evaluate for other options.     Assessment & Plan   Ayana \"Randall\" VINCENT Prasad is a 33 year old adult admitted on 8/13/2023 to inpatient psychiatry through ED where he initially presented on 8/10/23. He has a history of type 2 diabetes.     Type 2 diabetes mellitus  Patient is prescribed Lantus 15 units every morning and Ozempic weekly. He has not been compliant with this regimen for months. He states he believes that insulin is poison, that he was told this by someone. Most recent A1c in May was 9.1%. He has been refusing Lantus here. After some education and discussion, patient was willing to try taking the Lantus here.   - continue PTA Lantus 15 units q am  - hold Ozempic here  - hypoglycemia protocol  - glucose monitoring POC PRN  - check A1c  - recommend followup with PCP at discharge for continued management    Left upper back and shoulder pain  Patient reports starting from waking this am to now that he has had pain to his left upper back region radiating to his shoulder to his left chest and left arm. He states at its worst it was 8/10 in severity. He took ibuprofen and this did help. The pain is reproducible on exam. He denies any recent SOB, nausea, diaphoresis, clamminess.   - check troponin  - if negative, assume MSK cause, continue with ibuprofen PRN.     Medicine service will follow peripherally to evaluate troponin.      The patient's care was discussed with the Bedside Nurse and Patient.    Clinically Significant Risk Factors                  # Hypertension: Noted on problem list       # DMII: A1C = N/A within past 6 months, PRESENT ON ADMISSION  # Obesity: Estimated body mass index is 37.66 kg/m  as calculated from the following:    Height as of this encounter: 1.676 m (5' 6\").    " "Weight as of this encounter: 105.8 kg (233 lb 4.8 oz)., PRESENT ON ADMISSION            Yolie Rodriguez PA-C  Hospitalist Service  Securely message with [employer] (more info)  Text page via [facility] Paging/Directory   ______________________________________________________________________    Chief Complaint   Left shoulder pain    History is obtained from the patient    History of Present Illness   Ayana \"Randall\" VINCENT Prasad is a 33 year old adult who is seen on the psychiatric unit for a medical evaluation. He is being seen because he has been refusing his insulin. He states he believes that insulin is poison, that he was told this by someone. He has not had any adverse effects noted from the insulin or the Ozempic. After some education and discussion, patient was willing to try taking the Lantus here. Patient also reports starting from waking this am to now that he has had pain to his left upper back region radiating to his shoulder to his left chest and left arm. He states at its worst it was 8/10 in severity. He took ibuprofen and this did help. The pain is reproducible on exam. He denies any recent SOB, nausea, diaphoresis, clamminess.       Past Medical History    Past Medical History:   Diagnosis Date    ADHD (attention deficit hyperactivity disorder)     Bipolar 1 disorder     Borderline personality disorder     Cauda equina syndrome     Chronic low back pain     Depression     Diabetes mellitus, type 2 (H) 1/19/2023    GERD (gastroesophageal reflux disease)     h/o TBI (traumatic brain injury)     Hypertension, unspecified type 12/16/2021    Marginal corneal ulcer, left 07/17/2015    Nephrolithiasis     obesity     Polysubstance abuse - methamphetamine, hallucinagen, heroin, marijuana     currently in remission    PONV (postoperative nausea and vomiting)     PTSD (post-traumatic stress disorder)        Past Surgical History   Past Surgical History:   Procedure Laterality Date    BACK SURGERY  12/24/2016    BACK SURGERY " - For Cauda Equina Syndrome  12/24/2016    COLONOSCOPY      COMBINED CYSTOSCOPY, INSERT STENT URETER(S) Left 8/30/2018    Procedure: COMBINED CYSTOSCOPY, INSERT STENT URETER(S);  Cystoscopy With Left Stent Placement;  Surgeon: Kiran Ulrich MD;  Location: [facility] OR    COMBINED CYSTOSCOPY, RETROGRADES, EXCHANGE STENT URETER(S) Left 10/14/2018    Procedure: COMBINED CYSTOSCOPY, RETROGRADES, EXCHANGE STENT URETER(S);  Cystoscopy and removal of left-sided stent.;  Surgeon: Stiven Olivo MD;  Location:  OR    COMBINED CYSTOSCOPY, RETROGRADES, URETEROSCOPY, INSERT STENT  1/6/2014    Procedure: COMBINED CYSTOSCOPY, RETROGRADES, URETEROSCOPY, INSERT STENT;  Cystyoscopy place left ureteral stent;  Surgeon: Jaun Kimble MD;  Location: [facility] OR    COMBINED CYSTOSCOPY, RETROGRADES, URETEROSCOPY, INSERT STENT Left 10/23/2018    Procedure: Video cystoscopy, left ureteroscopy with stone extraction;  Surgeon: Bull Mast MD;  Location:  OR    CYSTOSCOPY, URETEROSCOPY, COMBINED Right 9/23/2015    Procedure: COMBINED CYSTOSCOPY, URETEROSCOPY;  Surgeon: ROME Jett MD;  Location: [facility] OR    ENT SURGERY      ESOPHAGOSCOPY, GASTROSCOPY, DUODENOSCOPY (EGD), COMBINED  4/8/2013    Procedure: COMBINED ESOPHAGOSCOPY, GASTROSCOPY, DUODENOSCOPY (EGD), BIOPSY SINGLE OR MULTIPLE;  Gastroscopy;  Surgeon: Peter Pickard MD;  Location: [facility]    ESOPHAGOSCOPY, GASTROSCOPY, DUODENOSCOPY (EGD), COMBINED Left 10/28/2014    Procedure: COMBINED ESOPHAGOSCOPY, GASTROSCOPY, DUODENOSCOPY (EGD), BIOPSY SINGLE OR MULTIPLE;  Surgeon: Narcisa Ramirez MD;  Location:  OR    ESOPHAGOSCOPY, GASTROSCOPY, DUODENOSCOPY (EGD), COMBINED N/A 12/24/2018    Procedure: COMBINED ESOPHAGOSCOPY, GASTROSCOPY, DUODENOSCOPY (EGD), BIOPSY SINGLE OR MULTIPLE;  Surgeon: Sonu Verduzco MD;  Location: [facility]    INJECT EPIDURAL TRANSFORAMINAL LUMBAR / SACRAL EA ADDITIONAL LEVEL Left 3/18/2021    Procedure: Left L5/S1 transforaminal injection  for selective L5 nerve root block;  Surgeon: Eliza Pagan MD;  Location: [facility] OR    LAPAROSCOPIC CHOLECYSTECTOMY  11/20/2014    [facility], Dr. Ramirez    LASER HOLMIUM LITHOTRIPSY URETER(S), INSERT STENT, COMBINED  4/2/2014    Procedure: COMBINED CYSTOSCOPY, URETEROSCOPY, LASER HOLMIUM LITHOTRIPSY URETER(S), INSERT STENT;  Cystoscopy,Left Ureteral Stent Removal,Left Ureteroscopy with Laser Lithotripsy,Left Ureter Stent Placement;  Surgeon: ROME Jett MD;  Location: [facility] OR    Transurethral stone resection  03/11/2011       Medications   Medications Prior to Admission   Medication Sig Dispense Refill Last Dose    amLODIPine (NORVASC) 10 MG tablet Take 1 tablet (10 mg) by mouth daily 30 tablet 0 8/8/2023    [START ON 9/7/2023] amphetamine-dextroamphetamine (ADDERALL XR) 25 MG 24 hr capsule Take 1 capsule (25 mg) by mouth daily for 30 days 30 capsule 0 8/10/2023    busPIRone (BUSPAR) 10 MG tablet Take 1 tablet (10 mg) by mouth 3 times daily 90 tablet 2 8/10/2023    clonazePAM (KLONOPIN) 0.5 MG tablet Take 0.5 mg by mouth daily       doxycycline monohydrate (MONODOX) 100 MG capsule Take 1 capsule (100 mg) by mouth 2 times daily 60 capsule 0 8/10/2023    gabapentin (NEURONTIN) 600 MG tablet Take 2 tablets (1,200 mg) by mouth 3 times daily 180 tablet 0 8/10/2023    insulin glargine (LANTUS PEN) 100 UNIT/ML pen Inject 15 Units Subcutaneous every morning (before breakfast) Take 15 units daily. Take half dose, 7 units, on days you do not eat 15 mL 0 More than a month    melatonin 5 MG tablet Take 1 tablet (5 mg) by mouth At Bedtime 30 tablet 0 8/9/2023    omeprazole (PRILOSEC) 20 MG DR capsule Take 1 capsule (20 mg) by mouth daily 30 capsule 0 8/10/2023    ondansetron (ZOFRAN ODT) 4 MG ODT tab Take 1 tablet (4 mg) by mouth daily as needed for nausea 30 tablet 0 8/9/2023    QUEtiapine (SEROQUEL) 200 MG tablet Take 1 tablet (200 mg) by mouth daily 30 tablet 1 8/9/2023    Semaglutide, 1 MG/DOSE, (OZEMPIC) 4  MG/3ML pen Inject 1 mg Subcutaneous every 7 days 3 mL 1 More than a month    testosterone (ANDROGEL 1.62 % PUMP) 20.25 MG/ACT gel Place 2 Pump onto the skin daily for 30 days Apply from dispenser to clean, dry, intact skin of the upper arms and shoulders. 75 g 3 8/10/2023    tretinoin (RETIN-A) 0.05 % external cream Apply topically At Bedtime Pea-sized amount to whole face 45 g 0 8/9/2023    ACE/ARB/ARNI NOT PRESCRIBED (INTENTIONAL) Please choose reason not prescribed from choices below.       amphetamine-dextroamphetamine (ADDERALL XR) 25 MG 24 hr capsule Take 1 capsule (25 mg) by mouth daily 30 capsule 0     [START ON 10/7/2023] amphetamine-dextroamphetamine (ADDERALL XR) 25 MG 24 hr capsule Take 1 capsule (25 mg) by mouth daily for 30 days 30 capsule 0     blood glucose (NO BRAND SPECIFIED) test strip Use to test blood sugar one times daily and as needed. To accompany: Blood Glucose Monitor Brands: per insurance. 100 strip 3     Continuous Blood Gluc  (FREESTYLE COLTEN 2 READER) ZEINAB Use to read blood sugars as per 's instructions. 1 each 0     Continuous Blood Gluc Sensor (FREESTYLE COLTEN 2 SENSOR) MISC Use to read blood sugars per 's instructions. Change sensor every 14 days. 6 each 0     thin (NO BRAND SPECIFIED) lancets Use with lanceting device to check blood sugars once per day. To accompany: Blood Glucose Monitor Brands: per insurance. 100 each 3           Review of Systems    The 5 point Review of Systems is negative other than noted in the HPI.     Physical Exam   Vital Signs: Temp: 98.2  F (36.8  C) Temp src: Temporal BP: (!) 129/90 Pulse: 99   Resp: 16 SpO2: 96 % O2 Device: None (Room air)    Weight: 233 lbs 4.8 oz    GENERAL: adult seen sitting and ambulating without difficulty in unit milieu. NAD.   NEURO / PSYCH: Alert, converses appropriately. Flat affect. No focal deficits. Moves all extremities.   HEENT: Anicteric sclera. PERRL.   CV: RRR. S1, S2. No murmurs  appreciated.  2+ left and right radial pulse, equal bilaterally.  RESPIRATORY: Effort normal. Lungs CTAB with no wheezing, rales, rhonchi.   MSK: no gross deformities  SKIN: No jaundice. No rashes or lesions to exposed areas.       Medical Decision Making       45 MINUTES SPENT BY ME on the date of service doing chart review, history, exam, documentation & further activities per the note.      Data   Recent Labs   Lab 08/15/23  1223 08/15/23  0802 08/14/23  1715 08/11/23  0837 08/10/23  2345   WBC  --   --   --   --  10.9   HGB  --   --   --   --  14.7   MCV  --   --   --   --  82   PLT  --   --   --   --  273   NA  --   --   --   --  136   POTASSIUM  --   --   --   --  3.5   CHLORIDE  --   --   --   --  101   CO2  --   --   --   --  21*   BUN  --   --   --   --  12.9   CR  --   --   --   --  0.71   ANIONGAP  --   --   --   --  14   WILLIAMS  --   --   --   --  9.5   * 164* 214*   < > 120*   ALBUMIN  --   --   --   --  4.4   PROTTOTAL  --   --   --   --  7.5   BILITOTAL  --   --   --   --  0.4   ALKPHOS  --   --   --   --  89   ALT  --   --   --   --  82*   AST  --   --   --   --  106*    < > = values in this interval not displayed.

## 2023-08-15 NOTE — CARE PLAN
08/15/23 8905   Group Therapy Session   Group Attendance attended group session   Total Time (minutes) 90   Total # Attendees 2,1   Group Type psychoeducation;task skill   Group Topic Covered coping skills/lifestyle management;leisure exploration/use of leisure time;structured socialization   Group Session Detail check-in, OT clinic   Patient Response/Contribution cooperative with task;able to recall/repeat info presented;listened actively;organized     Check-in, OT clinic ([id number]):  Pt was in a bright mood, much more awake and alert as compared to yesterday when he basically had his head on the table and slept for the duration of group, stated medications must have made him tired yesterday.  Pt was friendly and easily engaged in conversation - shared what he has been up to since last hospitalization.  Informed writer he bought mandala stone dotting art supplies that he used in group and has been carrying on with activity at home since, finding it relaxing.    OT clinic ([id number]):  Pt was interested in learning something new, and took interest in learning a wire sculpture activity.  Able to work fairly independently with instruction and demonstration.

## 2023-08-15 NOTE — TELEPHONE ENCOUNTER
R:    Intake notes Findings of Fact, Conclusions of Law, and Order for Continued Commitment  received in [facility] on 8/15/2023 at 9:53:38 AM, placed in Folder labeled Court/Commitment/[place]/Hold.     Intake notes pt's current location is on [address]. Intake faxed court paperwork to [address] at 10:19:03 AM.     10:20a Intake called [address] ([place]) to inform that paperwork has been faxed to the unit.

## 2023-08-15 NOTE — PLAN OF CARE
"Problem: Suicidal Behavior  Goal: Suicidal Behavior is Absent or Managed  8/14/2023 2025 by Juan Tesfaye RN  Outcome: Not Progressing   Goal Outcome Evaluation:    Near the beginning of the shift, pt appeared visibly agitated and stated to writer that he was feeling strongly suicidal. Pt endorsed command auditory hallucinations to kill self and endorsed intention to kill self. When assessed for a plan, pt stated that he would \"run into a wall or something.\" Upon further assessment of plan, pt stated that he did not know how he could kill himself in the hospital, but that he would impulsively kill himself if he found a way in the hospital. Provider was notified and pt was subsequently placed on a 1:1. Pt was given 10 mg Zyprexa PRN for agitation, with pt subsequently stating that he felt a significantly reduction in suicidal impulses. No behavioral instances of self harm observed or reported.     Engaged in extended conversation with pt, with pt discussing his mental health, stressors, and engaging in small talk. During this conversation, pt was calm and displayed some insight into his mental health with pt stating that he believes he probably does have schizoaffective disorder and that he struggles with certain negative beliefs and feeling.      Pt denied depression, rated anxiety 8/10. Given PRN clonazepam, with pt reporting the medication to be effective. Denied HI. Cooperative.     Pt reported a headache, given PRN ibuprofen.     Took all scheduled medications without issue.     /88   Pulse 99   Temp 98.4  F (36.9  C) (Oral)   Resp 18   Ht 1.676 m (5' 6\")   Wt 105.8 kg (233 lb 4.8 oz)   SpO2 98%   BMI 37.66 kg/m        "

## 2023-08-15 NOTE — PROGRESS NOTES
PSYCHIATRY PROGRESS NOTE         DATE OF SERVICE:   8/15/2023         CHIEF COMPLAINT:   Suicidal thoughts, auditory hallucinations telling  him to kill himself, refusing Insulin, agrees with DBT, asks for ECT          OBJECTIVE:     Nursing reports : Patient islept 7 hours, refused Insulin, last glucose 162     reports working on outpatient referrals    Medicine consult by Yolie Rodriguez PA 8/15/23  Type 2 diabetes mellitus  Patient is prescribed Lantus 15 units every morning and Ozempic weekly. He has not been compliant with this regimen for months. He states he believes that insulin is poison, that he was told this by someone. Most recent A1c in May was 9.1%. He has been refusing Lantus here. After some education and discussion, patient was willing to try taking the Lantus here.   - continue PTA Lantus 15 units q am  - hold Ozempic here  - hypoglycemia protocol  - glucose monitoring POC PRN  - check A1c  - recommend followup with PCP at discharge for continued management  Left upper back and shoulder pain  Patient reports starting from waking this am to now that he has had pain to his left upper back region radiating to his shoulder to his left chest and left arm. He states at its worst it was 8/10 in severity. He took ibuprofen and this did help. The pain is reproducible on exam. He denies any recent SOB, nausea, diaphoresis, clamminess.   - check troponin  - if negative, assume MSK cause, continue with ibuprofen PRN.   Medicine service will follow peripherally to evaluate troponin.             SUBJECTIVE:      I reviewed Epic electronic ER record and H&P by  Dr Lange and attending Dr Mendoza's notes.  During the interview with me Prakash says he is depressed. He says that his OP provider called 911 due to his suicidal thoughts.   He says he stays in , no structure, no day program, no work. He says he just thinks how to die, because there is no sense to live like this. His brother  last year  from heart attack. He has inadequate support system. He says that he does not want to take Insulin because he thinks it is a poison. He had diarrhea on Metformin. He thinks that insulin will kill him. I asked him  about fear of dying, but then saying he wants to die. He can not give any explanation, just repeats that he wants to die. We discussed risk of diabetes and high cholesterol on Seroquel and need for diabetes control. I educated him about role of Insulin and risk of dying without Insulin , not from Insulin, due to uncontrolled diabetes. Blood glucose  164, not really high considering noncompliance. He will be evaluated by Medicine. I talked with VASILE Vance. Mood is affected by uncontrolled diabetes .No homicidal thoughts. He reports auditory hallucinations telling him to to kill self. Paranoid that people are after him and trying to manipulate him and Tv talking to him. I ask him if he talked to his  about participating in DBT, Day [place], job training, sheltered work. He says he talked with his CM yesterday and he will go to DBT.Reports  sporadic marihuana use.   We discussed his medications. He says that he tolerates increase in Seroquel. He says it may help with voices, but it does not help with depression. He says he can not take antidepressant because he gets manic on antidepressant. He says that he would be willing to have ECT. He says he has never had ECT before.   He reports decreased sleep and appetite. He reports fluctuating energy and concentration.   He is on SIO for safety.          MEDICATIONS:      amLODIPine  10 mg Oral Daily    busPIRone  10 mg Oral TID    doxycycline hyclate  100 mg Oral BID    gabapentin  1,200 mg Oral TID    insulin glargine  15 Units Subcutaneous QAM AC    nicotine  1 patch Transdermal Daily    nicotine   Transdermal Q8H    omeprazole  20 mg Oral Daily    QUEtiapine  350 mg Oral At Bedtime    testosterone  20.25 mg Transdermal Daily     clonazePAM, glucose  "**OR** dextrose **OR** glucagon, diphenhydrAMINE-zinc acetate, hydrOXYzine **OR** hydrOXYzine, ibuprofen, melatonin, nicotine polacrilex, OLANZapine zydis, ondansetron    Medication adherence: Yes in the hospital, was missing medications out of the hospital   Medication side effects: No  Benefit: Symptom reduction         ROS:   As per history of present illness, otherwise reminder of review of systems is negative for: General, eyes, ears, nose, throat, neck, respiratory, cardiovascular, gastrointestinal, genitourinary, meniscal skeletal, neurological, hematological, dermatological and endocrine system.         MENTAL STATUS EXAM:   /88   Pulse 99   Temp 98.4  F (36.9  C) (Oral)   Resp 18   Ht 1.676 m (5' 6\")   Wt 105.8 kg (233 lb 4.8 oz)   SpO2 98%   BMI 37.66 kg/m      Appearance:fair hygiene  Orientation:x3  Speech:fluent  Language ability: intact  Thought process: concrete  Thought content: positive for auditory hallucinations telling her to kill self ,positive for paranoid delusions   Associations: Connected  Suicidal Ideation: present   Homicidal Ideation: denies   Mood:  depressed  Affect: irritable   Intellectual functioning:average  Fund of Knowledge: average  Attention/Concentration: decreased  Memory: intact  Psychomotor Behavior: mild  agitation   Muscle Strength and Tone: no atrophy or involuntary movement  Gait and Station: steady  Insight and judgement:impaired          LABS:   personally reviewed.   Lab Results   Component Value Date     08/10/2023     05/28/2023     05/24/2023     05/19/2021     01/10/2021     12/17/2020    CO2 21 08/10/2023    CO2 22 05/28/2023    CO2 24 05/24/2023    CO2 22 05/05/2023    CO2 23 01/16/2023    CO2 19 12/15/2022    CO2 19 05/19/2021    CO2 21 01/10/2021    CO2 19 12/17/2020    BUN 12.9 08/10/2023    BUN 12.5 05/28/2023    BUN 17.4 05/24/2023    BUN 13 05/05/2023    BUN 15 01/16/2023    BUN 18 12/15/2022    BUN 11 " 05/19/2021    BUN 13 01/10/2021    BUN 11 12/17/2020     Lab Results   Component Value Date    TROPONINI <0.01 12/08/2021     Lab Results   Component Value Date    WBC 10.9 08/10/2023    WBC 8.7 05/28/2023    WBC 11.4 05/24/2023    WBC 13.1 05/19/2021    WBC 12.0 01/10/2021    WBC 12.4 12/15/2020    HGB 14.7 08/10/2023    HGB 15.2 05/24/2023    HGB 14.9 05/05/2023    HGB 13.6 05/19/2021    HGB 12.2 03/01/2021    HGB 13.0 01/10/2021    HCT 43.9 08/10/2023    HCT 45.8 05/24/2023    HCT 45.2 05/05/2023    HCT 41.6 05/19/2021    HCT 39.8 01/10/2021    HCT 38.9 12/15/2020    MCV 82 08/10/2023    MCV 84 05/24/2023    MCV 84 05/05/2023    MCV 84 05/19/2021    MCV 83 01/10/2021    MCV 85 12/15/2020     08/10/2023    PLT  05/24/2023      Comment:      Platelets Clumped-Platelet Count Not Available     05/05/2023     05/19/2021     01/10/2021     12/18/2020     Lab Results   Component Value Date    CHOL 119 05/28/2023    CHOL 152 05/05/2023    CHOL 89 12/15/2022    CHOL 181 04/28/2021    CHOL 188 03/01/2021    CHOL 230 10/23/2019    TRIG 240 05/28/2023    TRIG 322 05/05/2023    TRIG 173 12/15/2022    TRIG 317 04/28/2021    TRIG 358 03/01/2021    TRIG 211 10/23/2019    HDL 35 05/28/2023    HDL 42 05/05/2023    HDL 44 12/15/2022    HDL 37 04/28/2021    HDL 66 03/01/2021    HDL 58 10/23/2019       Recent Results (from the past 24 hour(s))   Glucose by meter    Collection Time: 08/14/23  8:52 AM   Result Value Ref Range    GLUCOSE BY METER POCT 151 (H) 70 - 99 mg/dL   Glucose by meter    Collection Time: 08/14/23 12:25 PM   Result Value Ref Range    GLUCOSE BY METER POCT 130 (H) 70 - 99 mg/dL   Glucose by meter    Collection Time: 08/14/23  5:15 PM   Result Value Ref Range    GLUCOSE BY METER POCT 214 (H) 70 - 99 mg/dL       Latest Reference Range & Units 08/10/23 23:45   Sodium 136 - 145 mmol/L 136   Potassium 3.4 - 5.3 mmol/L 3.5   Chloride 98 - 107 mmol/L 101   Carbon Dioxide (CO2) 22 - 29  mmol/L 21 (L)   Urea Nitrogen 6.0 - 20.0 mg/dL 12.9   Creatinine 0.51 - 1.17 mg/dL 0.71   GFR Estimate >60 mL/min/1.73m2 >90   Calcium 8.6 - 10.0 mg/dL 9.5   Anion Gap 7 - 15 mmol/L 14   Albumin 3.5 - 5.2 g/dL 4.4   Protein Total 6.4 - 8.3 g/dL 7.5   Alkaline Phosphatase 35 - 129 U/L 89   ALT 0 - 70 U/L 82 (H)   AST 0 - 45 U/L 106 (H)   Bilirubin Total <=1.2 mg/dL 0.4   Glucose 70 - 99 mg/dL 120 (H)        [facility] Reference Range & Units 08/10/23 23:45 08/11/23 08:37   GLUCOSE BY METER POCT 70 - 99 mg/dL  163 (H)   WBC 4.0 - 11.0 10e3/uL 10.9    Hemoglobin 11.7 - 17.7 g/dL 14.7    Hematocrit 35.0 - 53.0 % 43.9    Platelet Count 150 - 450 10e3/uL 273    RBC Count 3.80 - 5.90 10e6/uL 5.36    MCV 78 - 100 fL 82    MCH 26.5 - 33.0 pg 27.4    MCHC 31.5 - 36.5 g/dL 33.5    RDW 10.0 - 15.0 % 14.6    % Neutrophils % 61    % Lymphocytes % 30    % Monocytes % 6    % Eosinophils % 2    % Basophils % 1    Absolute Basophils 0.0 - 0.2 10e3/uL 0.1    Absolute Eosinophils 0.0 - 0.7 10e3/uL 0.3    Absolute Immature Granulocytes <=0.4 10e3/uL 0.0    Absolute Lymphocytes 0.8 - 5.3 10e3/uL 3.2    Absolute Monocytes 0.0 - 1.3 10e3/uL 0.7    % Immature Granulocytes % 0    Absolute Neutrophils 1.6 - 8.3 10e3/uL 6.7    Absolute NRBCs 10e3/uL 0.0    NRBCs per 100 WBC <1 /100 0            DIAGNOSIS:     Schizoaffective disorder bipolar type     Generalized anxiety disorder  Mood disorder due to general medical condition/diabetes mellitus  Cannabis use disorder   Polysubstance use disorder in early remission.      Patient Active Problem List   Diagnosis    ADHD (attention deficit hyperactivity disorder)    Bipolar 1 disorder, manic, mild    Marijuana abuse    Polysubstance abuse (H)    GERD (gastroesophageal reflux disease)    Tobacco abuse    Intractable back pain    Optic neuritis    Cauda equina syndrome with neurogenic bladder (H)    Schizoaffective disorder, bipolar type (H)    PTSD (post-traumatic stress disorder)    Anxiety    Auditory  hallucination    Nephrolithiasis    Cyst of left ovary    Borderline personality disorder (H)    Cannabis use disorder, severe, dependence (H)    Depression    Episodic mood disorder (H)    History of heroin abuse (H)    Moderate episode of recurrent major depressive disorder (H)    Opioid use disorder, severe, dependence (H)    Substance-induced psychotic disorder with hallucinations (H)    Nausea    Overdose    Bella (H)    Urinary retention    Chronic bilateral low back pain without sciatica    AVA (generalized anxiety disorder)    Aggressive behavior    Gender identity disorder    Lumbosacral radiculopathy at L5    DUB (dysfunctional uterine bleeding)    Seizure-like activity (H)    Acanthosis nigricans    Schizoaffective disorder, chronic condition with acute exacerbation (H)    Bipolar affective disorder, mixed, severe, with psychotic behavior (H)    Schizophrenia, schizoaffective, chronic with acute exacerbation (H)    Akathisia    Hypertension, unspecified type    Female-to-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 person    Morbid obesity (H)    Diabetes mellitus, type 2 (H)    Suicidal ideation          PLAN:   Patient and I discussed diagnosis and treatment plan .He is under commitment, revoked PD on 8/14/2023 x 6 months. He was admitted for depression, hallucinations and suicidal thoughts. He has chronic persistent mental illness. He is resident of  ,  and does not have much structure. Persistent suicidal thoughts. Agrees with increasing Seroquel for hallucinations and mood control. Can not use antidepressant due to previous bella induction. Has  hallucinations even when mood is controlled. Agrees with increasing Seroquel and DBT, interested in ECT. Agrees with the following recommendations:    Medications:  Seroquel  350 mg at bedtime for mood stabilization   BuSpar 10 mg 3 times daily for anxiety  Neurontin 1200 mg 3 times daily for anxiety  Amlodipine 10 mg every morning for hypertension  Omeprazole 20 mg  daily  Testosterone 20.25 mg transdermal daily  Doxycycline 100 mg twice daily for acne  Insulin Lantus 15 units subcu every morning before breakfast  Nicotine patch 21 mg daily for nicotine use disorder  Clonazepam 0.5 mg daily prn anxiety  Hydroxyzine 25-50 mg q 6 hours prn anxiety  Melatonin 5 mg at bedtime prn sleep  We discussed side effects, benefits and alternative treatments and patient agrees   Suicide precaution  SIB precaution  Full code  Legal: revocation of PD with [place]   Rule 25 for chemical dependency treatment   will collect collateral information and make outpatient referrals  Staff to provide emotional support and redirect as needed  Patient encouraged to attend groups  Lab results: Reviewed personally  Consultation: Medicine consult for diabetes control and neck and back pain   SIO for safety due to persistent suicidal thoughts.  Pt interested in ECT. Dr Mendoza will discus that with him.    Risk Assessment: chronic persistent mental illness     Coordination of Care:   Patient seen, medical record reviewed, care coordinated with the team.    Total time:  More than 50 minutes  spent on this visit with more than 50% time  spent on coordination of care with staff, team meeting, reviewing medical record, educating patient about treatment options, side effects and benefits and alternative treatments for medications, providing supportive therapy regarding above issues,entering orders and preparing documentation for the visit.    This document is created with the help of Dragon dictation system.  All grammatical/typing errors or context distortion are unintentional and inherent to software.    Gillian Andrade MD         Re-Certification I certify that the inpatient psychiatric facility services furnished since the previous certification were, and continue to be, medically necessary for, either, treatment which could reasonably be expected to improve the patient s condition or diagnostic  study and that the hospital records indicate that the services furnished were, either, intensive treatment services, admission and related services necessary for diagnostic study, or equivalent services.     I certify that the patient continues to need, on a daily basis, active treatment furnished directly by or requiring the supervision of inpatient psychiatric facility personnel.   I estimate TBD days of hospitalization is necessary for proper treatment of the patient. My plans for post-hospital care for this patient are : Medications, appointments     Gillian Andrade MD

## 2023-08-15 NOTE — PLAN OF CARE
"  Problem: Plan of Care - These are the overarching goals to be used throughout the patient stay.    Goal: Plan of Care Review  Description: The Plan of Care Review/Shift note should be completed every shift.  The Outcome Evaluation is a brief statement about your assessment that the patient is improving, declining, or no change.  This information will be displayed automatically on your shift note.  Outcome: Progressing   Goal Outcome Evaluation:    Plan of Care Reviewed With: patient                 Patient alert and oriented times 4. Patient endorses SI and contracted for safety. Endorsed auditory / command hallucinations. Some anxiety and depression stating that she will act on the thoughts when she gets out of the hospital and that will bring peace. Patient has been calm and cooperative. Patient has been engaging on approach, attended group and compliant with some medications and not all.   Patient reported pain in upper back close to her shoulder which was radiating down her left hand and requested prn ibuprofen. Prn Ibuprofen 600 mg administered for pain at 6/10. Patient reported after an hour that the pain was still the same. Writer gave patient hot pack to try for the pain.    Patient not compliant with insulin administration noting \"I do not take Insulin\".    Continues to be on SIO for SI and has been cooperative with SIO staff.     Yolie from internal medication arrived on unit to meet with patient before 1400 and writer led her to patient and also informed her of pain in patient left upper back close to the shoulder and down the left hand (back of the hand). Yolie spoke with patient and noted that she will order labs due to the pain location and radiation. Patient had reported earlier that the pain was resolved after using the hot packs. Provider order Troponin labs-waiting for labs to come draw blood/labs.   She also reordered the daily insulin to be administered and patient was in agreeance with plan. 15 " units of Insulin glargine administered and patient was complaint. Patient has been in the afternoon group and engaged.     No further complaints noted this shift. Patient uses nicorette gum as needed.

## 2023-08-15 NOTE — PLAN OF CARE
Problem: Sleep Disturbance  Goal: Adequate Sleep/Rest  Outcome: Progressing   Goal Outcome Evaluation:      Patient appeared to sleep 7 hours this night shift.  No prns or snacks given or requested.  No concerns were reported or noted.  Prakash declined his BG at 0200 this morning and stated he felt fine,

## 2023-08-15 NOTE — CONSULTS
"Diabetes Educator consult received for Ayana OQUENDO \"Prakash\" Shanice, age 33, admitted 8/13/23 for suicidal ideation via ED.  EMS brought patient to ED and admitted on a 72 hour hold.  Patient identifies as a transgender male.  PMH is significant for schizoaffective disorder, bipolar type, borderline personality disorder, AVA, ADHD, PTSD and polysubstance abuse. Pt currently under stay of commitment through [facility].     Patient has history of type 2 DM initially treated with Metformin but he could not tolerate the side effects.  More recently he has been prescribed Lantus 15 units every morning and Ozempic weekly. He denies any side effects from the Ozempic.  He has not been compliant with this regimen for months. He states he believes that insulin is poison, he is afraid of it, [employer] told him the insulin is poison. Most recent A1c 8/10/23 is 8.8%.  He does have an Accu Chek glucose meter at home.    Met with Prakash on unit to discuss his diabetes history and provide some education regarding diabetes and its treatment.   Prakash asked if there were pills.  Described the difference between type 1 and type 2 DM, action of insulin, how our body makes and releases insulin to effectively use \"our fuel\", and that it is not poison.  Identified signs of a high sugar and he pointed to ones he has had on the paper; polyuria, polydipsia, fatigue.  He wants to think about what we discussed. Provided Prakash with Understanding Diabetes Booklet.    My recommendation would be to explore if an oral agent other than Metformin might be an alternative for glycemic mgt.  Not knowing his full DM history, do not know if he ever had C-Peptide, AVA run.  Something to consider.    Angely Mcgowan, [place]  Diabetes CNS/CDCES  [phone]  "

## 2023-08-15 NOTE — TELEPHONE ENCOUNTER
Patient calling reporting having bloody diarrhea and feeling very dizzy. Unsure if she can walk due to the dizziness. Advised patient to call 911. States will have wife drive her to the hospital.     Benito Galvin RN  [facility]       Reason for Disposition    Shock suspected (e.g., cold/pale/clammy skin, too weak to stand, low BP, rapid pulse)    Protocols used: RECTAL BLEEDING-ADULT-AH       No

## 2023-08-16 LAB
GLUCOSE BLDC GLUCOMTR-MCNC: 139 MG/DL (ref 70–99)
GLUCOSE BLDC GLUCOMTR-MCNC: 140 MG/DL (ref 70–99)
GLUCOSE BLDC GLUCOMTR-MCNC: 165 MG/DL (ref 70–99)
GLUCOSE BLDC GLUCOMTR-MCNC: 243 MG/DL (ref 70–99)

## 2023-08-16 PROCEDURE — 250N000013 HC RX MED GY IP 250 OP 250 PS 637: Performed by: PSYCHIATRY & NEUROLOGY

## 2023-08-16 PROCEDURE — 250N000013 HC RX MED GY IP 250 OP 250 PS 637: Performed by: FAMILY MEDICINE

## 2023-08-16 PROCEDURE — 99232 SBSQ HOSP IP/OBS MODERATE 35: CPT | Performed by: PSYCHIATRY & NEUROLOGY

## 2023-08-16 PROCEDURE — 250N000012 HC RX MED GY IP 250 OP 636 PS 637: Performed by: FAMILY MEDICINE

## 2023-08-16 PROCEDURE — 124N000002 HC R&B MH UMMC

## 2023-08-16 PROCEDURE — 250N000013 HC RX MED GY IP 250 OP 250 PS 637: Performed by: EMERGENCY MEDICINE

## 2023-08-16 PROCEDURE — G0177 OPPS/PHP; TRAIN & EDUC SERV: HCPCS

## 2023-08-16 RX ORDER — BUSPIRONE HYDROCHLORIDE 15 MG/1
15 TABLET ORAL 3 TIMES DAILY
Status: DISCONTINUED | OUTPATIENT
Start: 2023-08-16 | End: 2023-08-28 | Stop reason: HOSPADM

## 2023-08-16 RX ORDER — TRIFLUOPERAZINE HYDROCHLORIDE 2 MG/1
2 TABLET, FILM COATED ORAL 2 TIMES DAILY
Status: DISCONTINUED | OUTPATIENT
Start: 2023-08-16 | End: 2023-08-28 | Stop reason: HOSPADM

## 2023-08-16 RX ADMIN — AMLODIPINE BESYLATE 10 MG: 10 TABLET ORAL at 08:53

## 2023-08-16 RX ADMIN — Medication 5 MG: at 22:46

## 2023-08-16 RX ADMIN — DOXYCYCLINE HYCLATE 100 MG: 50 CAPSULE ORAL at 19:00

## 2023-08-16 RX ADMIN — BUSPIRONE HYDROCHLORIDE 10 MG: 10 TABLET ORAL at 09:11

## 2023-08-16 RX ADMIN — NICOTINE POLACRILEX 4 MG: 4 GUM, CHEWING BUCCAL at 15:45

## 2023-08-16 RX ADMIN — NICOTINE 1 PATCH: 21 PATCH, EXTENDED RELEASE TRANSDERMAL at 08:53

## 2023-08-16 RX ADMIN — TRIFLUOPERAZINE HYDROCHLORIDE 2 MG: 2 TABLET, FILM COATED ORAL at 19:00

## 2023-08-16 RX ADMIN — NICOTINE POLACRILEX 4 MG: 4 GUM, CHEWING BUCCAL at 21:05

## 2023-08-16 RX ADMIN — DOXYCYCLINE HYCLATE 100 MG: 50 CAPSULE ORAL at 08:53

## 2023-08-16 RX ADMIN — BUSPIRONE HYDROCHLORIDE 15 MG: 15 TABLET ORAL at 13:43

## 2023-08-16 RX ADMIN — GABAPENTIN 1200 MG: 600 TABLET, FILM COATED ORAL at 08:53

## 2023-08-16 RX ADMIN — NICOTINE POLACRILEX 4 MG: 4 GUM, CHEWING BUCCAL at 13:43

## 2023-08-16 RX ADMIN — QUETIAPINE FUMARATE 350 MG: 300 TABLET ORAL at 22:02

## 2023-08-16 RX ADMIN — IBUPROFEN 600 MG: 600 TABLET ORAL at 09:00

## 2023-08-16 RX ADMIN — GABAPENTIN 1200 MG: 600 TABLET, FILM COATED ORAL at 13:43

## 2023-08-16 RX ADMIN — NICOTINE POLACRILEX 4 MG: 4 GUM, CHEWING BUCCAL at 17:06

## 2023-08-16 RX ADMIN — NICOTINE POLACRILEX 4 MG: 4 GUM, CHEWING BUCCAL at 08:54

## 2023-08-16 RX ADMIN — OLANZAPINE 10 MG: 5 TABLET, ORALLY DISINTEGRATING ORAL at 21:05

## 2023-08-16 RX ADMIN — NICOTINE POLACRILEX 4 MG: 4 GUM, CHEWING BUCCAL at 18:06

## 2023-08-16 RX ADMIN — OMEPRAZOLE 20 MG: 20 CAPSULE, DELAYED RELEASE ORAL at 08:53

## 2023-08-16 RX ADMIN — NICOTINE POLACRILEX 4 MG: 4 GUM, CHEWING BUCCAL at 20:01

## 2023-08-16 RX ADMIN — BUSPIRONE HYDROCHLORIDE 15 MG: 15 TABLET ORAL at 19:00

## 2023-08-16 RX ADMIN — GABAPENTIN 1200 MG: 600 TABLET, FILM COATED ORAL at 19:00

## 2023-08-16 RX ADMIN — CLONAZEPAM 0.5 MG: 0.5 TABLET ORAL at 17:06

## 2023-08-16 RX ADMIN — INSULIN GLARGINE 15 UNITS: 100 INJECTION, SOLUTION SUBCUTANEOUS at 08:27

## 2023-08-16 RX ADMIN — TESTOSTERONE 20.25 MG: 20.25 GEL TOPICAL at 08:53

## 2023-08-16 RX ADMIN — NICOTINE POLACRILEX 4 MG: 4 GUM, CHEWING BUCCAL at 07:40

## 2023-08-16 ASSESSMENT — ACTIVITIES OF DAILY LIVING (ADL)
ADLS_ACUITY_SCORE: 42
ORAL_HYGIENE: INDEPENDENT
LAUNDRY: UNABLE TO COMPLETE
ADLS_ACUITY_SCORE: 42
DRESS: SCRUBS (BEHAVIORAL HEALTH);INDEPENDENT
HYGIENE/GROOMING: HANDWASHING;INDEPENDENT
ADLS_ACUITY_SCORE: 42
HYGIENE/GROOMING: INDEPENDENT

## 2023-08-16 NOTE — PROGRESS NOTES
Brief medicine note    Medicine service following up for patient's troponin, which was checked for some left shoulder pain radiating to his chest.  Troponin returned at <6.  No plans for further monitoring at this time.  As pain is reproducible, suspect musculoskeletal pain.  Continue treatment with as needed ibuprofen, which has provided patient relief.  Please refer to consultation note on 8/15 for further details.      Medicine service to sign off at this time.  Please contact or consult us with any new medical questions or concerns.      Yolie Rodriguez PA-C  [facility]  8/16/2023 9:17 AM

## 2023-08-16 NOTE — CARE PLAN
Assessment/Intervention/Current Symtoms and Care Coordination:  Chart review and met with team, discussed pt progress, symptomology, and response to treatment.  Discussed the discharge plan and any potential impediments to discharge.     -Writer met with pt and pt was in a pleasant mood. Writer discussed pt's feelings around more supportive services and pt was agreeable to IOP services along with DBT. Pt reports his CM is deciding if he will go back to his facility due to it being a toxic environment and drug use is happening on site. Writer will locate available IOP's and obtain VAL from pt.       Discharge Plan or Goal  Back to group home     Barriers to Discharge:  Symptom and medication stabilization     Referral Status:  TBD-DBT      Legal Status:  Committed   County: [facility]   File Number: [id number]   Start and expiration date of commitment: 8/14/23-2/14/24     Contacts:  : Cristy at [facility], [phone] (VAL signed)   [facility]: Deena at [employer], [phone] (VAL signed)   [facility] Director and Nurse: Domenica [phone] (VAL signed)   CADI worker: Pretty Guzman at [employer], [phone] (VAL signed)   Psychotherapist: Joshua at [employer], [phone] (VAL signed)       Upcoming Meetings and Dates/Important Information and next steps:

## 2023-08-16 NOTE — PLAN OF CARE
"  Problem: Adult Behavioral Health Plan of Care  Goal: Adheres to Safety Considerations for Self and Others  Outcome: Progressing  Flowsheets (Taken 8/15/2023 2143)  Adheres to Safety Considerations for Self and Others: making progress toward outcome     Problem: Plan of Care - These are the overarching goals to be used throughout the patient stay.    Goal: Optimal Comfort and Wellbeing  Intervention: Provide Person-Centered Care  Recent Flowsheet Documentation  Taken 8/15/2023 7317 by Jalen Deng RN  Trust Relationship/Rapport:   care explained   choices provided   emotional support provided   empathic listening provided   questions answered   questions encouraged   reassurance provided   thoughts/feelings acknowledged   Goal Outcome Evaluation:    Plan of Care Reviewed With: patient        Patient remains on 1:1 SIO to monitor for SI/SIB. Patient continues to report suicidal thoughts and did report that without staff present would most likely act in one conversation indicating plan to hang self with bed sheet. Patient sena for safety with SIO staff and reporting he can still talk with staff before acting on thoughts.    Patient spent the majority of the evening in the milieu attending group and using the phone, observed social mainly with staff. Patient upon approach flat in affect though brightens with interactions. Patient reporting continued anxiety, agitation, and SI/SIB related to intrusive thoughts and auditory hallucinations. Patient denies depression and reporting no thoughts of harming others. Patient reported anger with meeting with provider today and stated the doctor accused him of being delusional which he denies. Patient also refused dinner tray reporting the diabetic diet and the meal tray that arrived further worsened symptoms. Patient while awaiting an alternative dinner choice did make a comment to RN writer stating \"I think I'm going to have a code tonight\", this said as a code was " "occurring on the neighboring unit. With reassurance and discussing coping techniques patient was able to identify plan to eat dinner in the therapy room and work on long division as a coping mechanism.     Patient cooperative with vital sign assessments and was stable. Patient cooperative with blood glucose assessments which were 179 at dinner, and 199 HS. Patient reporting difficulty with diabetic medication regimen including injectable insulin which he reports is \"poison\". Patient early in the evening met with diabetic educator and stated goal to get switched to oral diabetes medication. RN writer provided education for low carbohydrate diet and identified low carb choices. Patient receptive though appears unwilling to accept low carb choices.     Patient independent with requesting HS and PRN medications. Patient requesting numerous PRN's throughout the evening to target anxiety, agitation, auditory hallucinations, and pain. Patient early in the evening requested PRN Hydroxyzine for anxiety. A half hour later also requested PRN Zyprexa reporting increase in agitation related to intrusive thoughts and auditory hallucinations. Patient requested HS anxiety medications early due to continuation of symptoms. Patient at 2010 again requesting PRN Zyprexa for continuation of symptoms. Patient does report continuation of symptoms with current medications but identifies slight relief with combination of medications and staff reassurance. Patient does demonstrate some dependence on SIO staff and RN writer throughout these often requiring long conversations to talk through symptoms.     Patient continued to report left arm pain and later in the evening headache as well 7/10. Patient requested PRN Ibuprofen and was receptive of applying heat and soft care mattress to further address the symptoms. Patient was cooperative with lab assessments with the troponin coming back within limits. Patient denies any further physical " symptoms and vitals were stable.

## 2023-08-16 NOTE — PLAN OF CARE
Goal Outcome Evaluation:    Plan of Care Reviewed With: patient      Problem: Plan of Care - These are the overarching goals to be used throughout the patient stay.    Goal: Plan of Care Review  Description: The Plan of Care Review/Shift note should be completed every shift.  The Outcome Evaluation is a brief statement about your assessment that the patient is improving, declining, or no change.  This information will be displayed automatically on your shift note.  Outcome: Progressing  Pt was visible on milieu with a flat and blunted affect. Pt cooperated with medications and vital signs check. Pt continued to be on SIO for SIB, no SIB this shift. Pt is medication compliant, denied side effects. Pt had adequate intake of both meals as well as fluids. Pt's Buspar was increased to 15 mg and they were started on stelazine 2 mg bid.   Will continue to monitor.

## 2023-08-16 NOTE — PROGRESS NOTES
PSYCHIATRY PROGRESS NOTE         DATE OF SERVICE:   8/16/2023         CHIEF COMPLAINT:     Depression, anxiety, suicidal thoughts , can not contract for safety on the unit , asks for Stelazine which helped her in the past          OBJECTIVE:     Nursing reports : Patient slept most of the night, on SIO, takes medications, going to groups      reports working on outpatient referrals    Medicine consult by Yolie Rodriguez PA 8/15/23  Type 2 diabetes mellitus  Patient is prescribed Lantus 15 units every morning and Ozempic weekly. He has not been compliant with this regimen for months. He states he believes that insulin is poison, that he was told this by someone. Most recent A1c in May was 9.1%. He has been refusing Lantus here. After some education and discussion, patient was willing to try taking the Lantus here.   - continue PTA Lantus 15 units q am  - hold Ozempic here  - hypoglycemia protocol  - glucose monitoring POC PRN  - check A1c  - recommend followup with PCP at discharge for continued management  Left upper back and shoulder pain  Patient reports starting from waking this am to now that he has had pain to his left upper back region radiating to his shoulder to his left chest and left arm. He states at its worst it was 8/10 in severity. He took ibuprofen and this did help. The pain is reproducible on exam. He denies any recent SOB, nausea, diaphoresis, clamminess.   - check troponin  - if negative, assume MSK cause, continue with ibuprofen PRN.   Medicine service will follow peripherally to evaluate troponin.     Endocrinology consult by Angely SEN on 8/15/2023   Patient has history of type 2 DM initially treated with Metformin but he could not tolerate the side effects.  More recently he has been prescribed Lantus 15 units every morning and Ozempic weekly. He denies any side effects from the Ozempic.  He has not been compliant with this regimen for months. He states he believes that  "insulin is poison, he is afraid of it, [employer] told him the insulin is poison. Most recent A1c 8/10/23 is 8.8%.  He does have an Accu Chek glucose meter at home.   Met with Prakash on unit to discuss his diabetes history and provide some education regarding diabetes and its treatment.   Prakash asked if there were pills.  Described the difference between type 1 and type 2 DM, action of insulin, how our body makes and releases insulin to effectively use \"our fuel\", and that it is not poison.  Identified signs of a high sugar and he pointed to ones he has had on the paper; polyuria, polydipsia, fatigue.  He wants to think about what we discussed. Provided Prakash with Understanding Diabetes Booklet.  Explore if an oral agent other than Metformin might be an alternative for glycemic mgt.  Not knowing his full DM history, do not know if he ever had C-Peptide, AVA run.  Something to consider.   See full note for details.             SUBJECTIVE:      Prakash continues to have suicidal thoughts.  He cannot contract for safety.  He says that if he was not on SIO one-to-one supervision, he will try to hurt himself. He says he wrapped a cord  around his neck when he was in the ER. He says he does not he denies homicidal thoughts.  See the reasons to live.  He has auditory hallucinations telling him to kill himself.  He reports decreased energy and decreased concentration, decreased sleep and appetite.  He agrees to take insulin . He met with diabetic educator.  He wants ECT .I discussed it with him and I explain that ECT is tx option, but we could  try medications first. We discussed Lithium. He says that he was on Stelazine first because it helped him in the past. He says he was on it in 2016 and he stopped taking it when he decided he did not need medications. We discussed risk of tardive dyskinesia as it is more prominent with older type of neuroleptics such as Stelazine.   He attends groups. No altercations with staff and " "patients.         MEDICATIONS:      amLODIPine  10 mg Oral Daily    busPIRone  10 mg Oral TID    doxycycline hyclate  100 mg Oral BID    gabapentin  1,200 mg Oral TID    insulin glargine  15 Units Subcutaneous QAM AC    nicotine  1 patch Transdermal Daily    nicotine   Transdermal Q8H    omeprazole  20 mg Oral Daily    QUEtiapine  350 mg Oral At Bedtime    testosterone  20.25 mg Transdermal Daily     clonazePAM, glucose **OR** dextrose **OR** glucagon, diphenhydrAMINE-zinc acetate, hydrOXYzine **OR** hydrOXYzine, ibuprofen, melatonin, nicotine polacrilex, OLANZapine zydis, ondansetron    Medication adherence: Yes in the hospital, was missing medications out of the hospital   Medication side effects: No  Benefit: Symptom reduction         ROS:   As per history of present illness, otherwise reminder of review of systems is negative for: General, eyes, ears, nose, throat, neck, respiratory, cardiovascular, gastrointestinal, genitourinary, meniscal skeletal, neurological, hematological, dermatological and endocrine system.         MENTAL STATUS EXAM:   /85 (BP Location: Right arm, Patient Position: Sitting, Cuff Size: Adult Regular)   Pulse 81   Temp 97.4  F (36.3  C) (Temporal)   Resp 16   Ht 1.676 m (5' 6\")   Wt 105.8 kg (233 lb 4.8 oz)   SpO2 94%   BMI 37.66 kg/m      Appearance:fair hygiene  Orientation:x3  Speech:fluent  Language ability: intact  Thought process: concrete  Thought content: positive for auditory hallucinations telling her to kill self ,positive for paranoid delusions   Associations: Connected  Suicidal Ideation: present   Homicidal Ideation: denies   Mood:  depressed  Affect: irritable   Intellectual functioning:average  Fund of Knowledge: average  Attention/Concentration: decreased  Memory: intact  Psychomotor Behavior: mild  agitation   Muscle Strength and Tone: no atrophy or involuntary movement  Gait and Station: steady  Insight and judgement:impaired          LABS:   personally " reviewed.   Lab Results   Component Value Date     08/10/2023     05/28/2023     05/24/2023     05/19/2021     01/10/2021     12/17/2020    CO2 21 08/10/2023    CO2 22 05/28/2023    CO2 24 05/24/2023    CO2 22 05/05/2023    CO2 23 01/16/2023    CO2 19 12/15/2022    CO2 19 05/19/2021    CO2 21 01/10/2021    CO2 19 12/17/2020    BUN 12.9 08/10/2023    BUN 12.5 05/28/2023    BUN 17.4 05/24/2023    BUN 13 05/05/2023    BUN 15 01/16/2023    BUN 18 12/15/2022    BUN 11 05/19/2021    BUN 13 01/10/2021    BUN 11 12/17/2020     Lab Results   Component Value Date    TROPONINI <0.01 12/08/2021     Lab Results   Component Value Date    WBC 10.9 08/10/2023    WBC 8.7 05/28/2023    WBC 11.4 05/24/2023    WBC 13.1 05/19/2021    WBC 12.0 01/10/2021    WBC 12.4 12/15/2020    HGB 14.7 08/10/2023    HGB 15.2 05/24/2023    HGB 14.9 05/05/2023    HGB 13.6 05/19/2021    HGB 12.2 03/01/2021    HGB 13.0 01/10/2021    HCT 43.9 08/10/2023    HCT 45.8 05/24/2023    HCT 45.2 05/05/2023    HCT 41.6 05/19/2021    HCT 39.8 01/10/2021    HCT 38.9 12/15/2020    MCV 82 08/10/2023    MCV 84 05/24/2023    MCV 84 05/05/2023    MCV 84 05/19/2021    MCV 83 01/10/2021    MCV 85 12/15/2020     08/10/2023    PLT  05/24/2023      Comment:      Platelets Clumped-Platelet Count Not Available     05/05/2023     05/19/2021     01/10/2021     12/18/2020     Lab Results   Component Value Date    CHOL 119 05/28/2023    CHOL 152 05/05/2023    CHOL 89 12/15/2022    CHOL 181 04/28/2021    CHOL 188 03/01/2021    CHOL 230 10/23/2019    TRIG 240 05/28/2023    TRIG 322 05/05/2023    TRIG 173 12/15/2022    TRIG 317 04/28/2021    TRIG 358 03/01/2021    TRIG 211 10/23/2019    HDL 35 05/28/2023    HDL 42 05/05/2023    HDL 44 12/15/2022    HDL 37 04/28/2021    HDL 66 03/01/2021    HDL 58 10/23/2019       Recent Results (from the past 24 hour(s))   Glucose by meter    Collection Time: 08/15/23 12:23 PM    Result Value Ref Range    GLUCOSE BY METER POCT 129 (H) 70 - 99 mg/dL   Troponin T, High Sensitivity    Collection Time: 08/15/23  4:15 PM   Result Value Ref Range    Troponin T, High Sensitivity <6 <=22 ng/L   Glucose by meter    Collection Time: 08/15/23  5:15 PM   Result Value Ref Range    GLUCOSE BY METER POCT 179 (H) 70 - 99 mg/dL   Glucose by meter    Collection Time: 08/15/23  9:29 PM   Result Value Ref Range    GLUCOSE BY METER POCT 199 (H) 70 - 99 mg/dL   Glucose by meter    Collection Time: 08/16/23  8:21 AM   Result Value Ref Range    GLUCOSE BY METER POCT 165 (H) 70 - 99 mg/dL       Latest Reference Range & Units 08/10/23 23:45   Sodium 136 - 145 mmol/L 136   Potassium 3.4 - 5.3 mmol/L 3.5   Chloride 98 - 107 mmol/L 101   Carbon Dioxide (CO2) 22 - 29 mmol/L 21 (L)   Urea Nitrogen 6.0 - 20.0 mg/dL 12.9   Creatinine 0.51 - 1.17 mg/dL 0.71   GFR Estimate >60 mL/min/1.73m2 >90   Calcium 8.6 - 10.0 mg/dL 9.5   Anion Gap 7 - 15 mmol/L 14   Albumin 3.5 - 5.2 g/dL 4.4   Protein Total 6.4 - 8.3 g/dL 7.5   Alkaline Phosphatase 35 - 129 U/L 89   ALT 0 - 70 U/L 82 (H)   AST 0 - 45 U/L 106 (H)   Bilirubin Total <=1.2 mg/dL 0.4   Glucose 70 - 99 mg/dL 120 (H)        Latest Reference Range & Units 08/10/23 23:45 08/11/23 08:37   GLUCOSE BY METER POCT 70 - 99 mg/dL  163 (H)   WBC 4.0 - 11.0 10e3/uL 10.9    Hemoglobin 11.7 - 17.7 g/dL 14.7    Hematocrit 35.0 - 53.0 % 43.9    Platelet Count 150 - 450 10e3/uL 273    RBC Count 3.80 - 5.90 10e6/uL 5.36    MCV 78 - 100 fL 82    MCH 26.5 - 33.0 pg 27.4    MCHC 31.5 - 36.5 g/dL 33.5    RDW 10.0 - 15.0 % 14.6    % Neutrophils % 61    % Lymphocytes % 30    % Monocytes % 6    % Eosinophils % 2    % Basophils % 1    Absolute Basophils 0.0 - 0.2 10e3/uL 0.1    Absolute Eosinophils 0.0 - 0.7 10e3/uL 0.3    Absolute Immature Granulocytes <=0.4 10e3/uL 0.0    Absolute Lymphocytes 0.8 - 5.3 10e3/uL 3.2    Absolute Monocytes 0.0 - 1.3 10e3/uL 0.7    % Immature Granulocytes % 0     Absolute Neutrophils 1.6 - 8.3 10e3/uL 6.7    Absolute NRBCs 10e3/uL 0.0    NRBCs per 100 WBC <1 /100 0            DIAGNOSIS:     Schizoaffective disorder bipolar type     Generalized anxiety disorder  Mood disorder due to general medical condition/diabetes mellitus  Cannabis use disorder   Polysubstance use disorder in early remission.      Patient Active Problem List   Diagnosis    ADHD (attention deficit hyperactivity disorder)    Bipolar 1 disorder, manic, mild    Marijuana abuse    Polysubstance abuse (H)    GERD (gastroesophageal reflux disease)    Tobacco abuse    Intractable back pain    Optic neuritis    Cauda equina syndrome with neurogenic bladder (H)    Schizoaffective disorder, bipolar type (H)    PTSD (post-traumatic stress disorder)    Anxiety    Auditory hallucination    Nephrolithiasis    Cyst of left ovary    Borderline personality disorder (H)    Cannabis use disorder, severe, dependence (H)    Depression    Episodic mood disorder (H)    History of heroin abuse (H)    Moderate episode of recurrent major depressive disorder (H)    Opioid use disorder, severe, dependence (H)    Substance-induced psychotic disorder with hallucinations (H)    Nausea    Overdose    Bella (H)    Urinary retention    Chronic bilateral low back pain without sciatica    AVA (generalized anxiety disorder)    Aggressive behavior    Gender identity disorder    Lumbosacral radiculopathy at L5    DUB (dysfunctional uterine bleeding)    Seizure-like activity (H)    Acanthosis nigricans    Schizoaffective disorder, chronic condition with acute exacerbation (H)    Bipolar affective disorder, mixed, severe, with psychotic behavior (H)    Schizophrenia, schizoaffective, chronic with acute exacerbation (H)    Akathisia    Hypertension, unspecified type    Female-to-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 person    Morbid obesity (H)    Diabetes mellitus, type 2 (H)    Suicidal ideation    Mood disorder due to a general medical condition          PLAN:    Patient and I discussed diagnosis and treatment plan .He is under commitment, revoked PD on 8/14/2023 x 6 months. He was admitted for depression, hallucinations and suicidal thoughts. He has chronic persistent mental illness. He is resident of  ,  and does not have much structure. Persistent suicidal thoughts. Can not contract for safety, needs SIO. Asks for Stelazine before he would consider Lithium. Agrees to wait for  ECT eval and to give medication chance.He is also on Seroquel, has some response  to it, but inadequate.   Can not use antidepressant due to previous haven induction. Has  hallucinations even when mood is controlled. Agrees with increasing Seroquel and DBT, interested in ECT. Agrees with the following recommendations:    Medications:  Increase Buspar 15 mg tid for anxiety  Start Stelazine 2 mg bid for hallucinations, if that does not work, consider lithium, before ECT  Seroquel  350 mg at bedtime for mood stabilization   Neurontin 1200 mg 3 times daily for anxiety  Amlodipine 10 mg every morning for hypertension  Omeprazole 20 mg daily  Testosterone 20.25 mg transdermal daily  Doxycycline 100 mg twice daily for acne  Insulin Lantus 15 units subcu every morning before breakfast  Nicotine patch 21 mg daily for nicotine use disorder  Clonazepam 0.5 mg daily prn anxiety  Hydroxyzine 25-50 mg q 6 hours prn anxiety  Melatonin 5 mg at bedtime prn sleep  We discussed side effects, benefits and alternative treatments and patient agrees   Suicide precaution  SIB precaution  Full code  Legal: revocation of PD with [place]   Rule 25 for chemical dependency treatment   will collect collateral information and make outpatient referrals  Staff to provide emotional support and redirect as needed  Patient encouraged to attend groups  Lab results: Reviewed personally  Consultation: Medicine consult for diabetes control and neck and back pain   SIO for safety due to persistent suicidal thoughts.  Pt  interested in ECT. Dr Mendoza will discus that with him.    Risk Assessment: chronic persistent mental illness     Coordination of Care:   Patient seen, medical record reviewed, care coordinated with the team.    Total time:  More than 35  spent on this visit with more than 50% time  spent on coordination of care with staff, team meeting, reviewing medical record, educating patient about treatment options, side effects and benefits and alternative treatments for medications, providing supportive therapy regarding above issues,entering orders and preparing documentation for the visit.    This document is created with the help of Dragon dictation system.  All grammatical/typing errors or context distortion are unintentional and inherent to software.    Gillian Andrade MD         Re-Certification I certify that the inpatient psychiatric facility services furnished since the previous certification were, and continue to be, medically necessary for, either, treatment which could reasonably be expected to improve the patient s condition or diagnostic study and that the hospital records indicate that the services furnished were, either, intensive treatment services, admission and related services necessary for diagnostic study, or equivalent services.     I certify that the patient continues to need, on a daily basis, active treatment furnished directly by or requiring the supervision of inpatient psychiatric facility personnel.   I estimate TBD days of hospitalization is necessary for proper treatment of the patient. My plans for post-hospital care for this patient are : Medications, appointments     Gillian Andrade MD

## 2023-08-16 NOTE — PLAN OF CARE
Pt asleep at start of shift.     Breathing quiet and unlabored when sleeping.     Pt had no c/o pain or discomfort during the HS.     Appears to have slept 7 hours.     Pt continues on 1:1 SIO/5 ft space distance.     Goal Outcome Evaluation:  Problem: Adult Behavioral Health Plan of Care  Goal: Adheres to Safety Considerations for Self and Others  Intervention: Develop and Maintain Individualized Safety Plan  Recent Flowsheet Documentation  Taken 8/16/2023 0000 by Nancy Dietz, RN  Safety Measures:   safety rounds completed   suicide assessment completed   1:1 observation maintained  Goal: Absence of New-Onset Illness or Injury  Intervention: Identify and Manage Fall Risk  Recent Flowsheet Documentation  Taken 8/16/2023 0000 by Nancy Dietz, RN  Safety Measures:   safety rounds completed   suicide assessment completed   1:1 observation maintained     Problem: Sleep Disturbance  Goal: Adequate Sleep/Rest  Outcome: Progressing

## 2023-08-16 NOTE — CARE PLAN
08/16/23 1338   Group Therapy Session   Group Attendance attended group session   Time Session Began 1115   Time Session Ended 1200   Total Time (minutes) 45   Total # Attendees 3   Group Type Occupational Therapy   Group Topic Covered balanced lifestyle;coping skills/lifestyle management;leisure exploration/use of leisure time;relaxation techniques   Group Session Detail OT Clinic Group   Patient Participation Detail Intervention: OT Clinic with 2 peers. Pt participated in a OT Clinic group to facilitate coping skills exploration and creative expression through personally meaningful activities, and to encourage utilization of these healthy coping skills to promote overall health and wellness. Group included clinical observation of social, cognitive and kinesthetic performance skills to inform treatment and safe discharge planning.    Patient Response:  Joined clinic group with SIO staff present during this time to continue working on wire tree project. Expressed gratitude to writer for assisting in finding more beads needed to complete the project. Was social off and on with another peer in the group regarding various topics such as music. Worked on project for the duration of time in group and cleaned up supplies and thanked writer for their time. Used tool and materials appropriately and not express any SIB urges or behaviors.     Mood/Affect: Pleasant       Plan: Patient encouraged to maintain attendance for continued ongoing support in working towards occupational therapy goals to support overall treatment/care.

## 2023-08-17 LAB
GLUCOSE BLDC GLUCOMTR-MCNC: 137 MG/DL (ref 70–99)
GLUCOSE BLDC GLUCOMTR-MCNC: 161 MG/DL (ref 70–99)
GLUCOSE BLDC GLUCOMTR-MCNC: 164 MG/DL (ref 70–99)
GLUCOSE BLDC GLUCOMTR-MCNC: 166 MG/DL (ref 70–99)

## 2023-08-17 PROCEDURE — 250N000013 HC RX MED GY IP 250 OP 250 PS 637: Performed by: PSYCHIATRY & NEUROLOGY

## 2023-08-17 PROCEDURE — 99233 SBSQ HOSP IP/OBS HIGH 50: CPT | Performed by: PSYCHIATRY & NEUROLOGY

## 2023-08-17 PROCEDURE — 124N000002 HC R&B MH UMMC

## 2023-08-17 PROCEDURE — 250N000013 HC RX MED GY IP 250 OP 250 PS 637: Performed by: EMERGENCY MEDICINE

## 2023-08-17 PROCEDURE — 250N000013 HC RX MED GY IP 250 OP 250 PS 637: Performed by: FAMILY MEDICINE

## 2023-08-17 RX ORDER — QUETIAPINE FUMARATE 400 MG/1
400 TABLET, FILM COATED ORAL AT BEDTIME
Status: DISCONTINUED | OUTPATIENT
Start: 2023-08-17 | End: 2023-08-25

## 2023-08-17 RX ORDER — LITHIUM CARBONATE 300 MG/1
600 TABLET, FILM COATED, EXTENDED RELEASE ORAL AT BEDTIME
Status: DISCONTINUED | OUTPATIENT
Start: 2023-08-17 | End: 2023-08-23

## 2023-08-17 RX ADMIN — AMLODIPINE BESYLATE 10 MG: 10 TABLET ORAL at 08:11

## 2023-08-17 RX ADMIN — QUETIAPINE FUMARATE 400 MG: 400 TABLET ORAL at 21:12

## 2023-08-17 RX ADMIN — OLANZAPINE 10 MG: 5 TABLET, ORALLY DISINTEGRATING ORAL at 10:55

## 2023-08-17 RX ADMIN — NICOTINE POLACRILEX 4 MG: 4 GUM, CHEWING BUCCAL at 11:07

## 2023-08-17 RX ADMIN — HYDROXYZINE HYDROCHLORIDE 50 MG: 50 TABLET, FILM COATED ORAL at 04:50

## 2023-08-17 RX ADMIN — OMEPRAZOLE 20 MG: 20 CAPSULE, DELAYED RELEASE ORAL at 08:11

## 2023-08-17 RX ADMIN — TRIFLUOPERAZINE HYDROCHLORIDE 2 MG: 2 TABLET, FILM COATED ORAL at 21:12

## 2023-08-17 RX ADMIN — NICOTINE POLACRILEX 4 MG: 4 GUM, CHEWING BUCCAL at 22:05

## 2023-08-17 RX ADMIN — BUSPIRONE HYDROCHLORIDE 15 MG: 15 TABLET ORAL at 14:23

## 2023-08-17 RX ADMIN — NICOTINE POLACRILEX 4 MG: 4 GUM, CHEWING BUCCAL at 06:45

## 2023-08-17 RX ADMIN — LITHIUM CARBONATE 600 MG: 300 TABLET, EXTENDED RELEASE ORAL at 21:11

## 2023-08-17 RX ADMIN — GABAPENTIN 1200 MG: 600 TABLET, FILM COATED ORAL at 14:22

## 2023-08-17 RX ADMIN — TESTOSTERONE 20.25 MG: 20.25 GEL TOPICAL at 08:11

## 2023-08-17 RX ADMIN — NICOTINE POLACRILEX 4 MG: 4 GUM, CHEWING BUCCAL at 12:47

## 2023-08-17 RX ADMIN — NICOTINE POLACRILEX 4 MG: 4 GUM, CHEWING BUCCAL at 14:23

## 2023-08-17 RX ADMIN — GABAPENTIN 1200 MG: 600 TABLET, FILM COATED ORAL at 08:10

## 2023-08-17 RX ADMIN — NICOTINE POLACRILEX 4 MG: 4 GUM, CHEWING BUCCAL at 16:31

## 2023-08-17 RX ADMIN — GABAPENTIN 1200 MG: 600 TABLET, FILM COATED ORAL at 21:12

## 2023-08-17 RX ADMIN — NICOTINE 1 PATCH: 21 PATCH, EXTENDED RELEASE TRANSDERMAL at 08:10

## 2023-08-17 RX ADMIN — DOXYCYCLINE HYCLATE 100 MG: 50 CAPSULE ORAL at 08:10

## 2023-08-17 RX ADMIN — INSULIN GLARGINE 15 UNITS: 100 INJECTION, SOLUTION SUBCUTANEOUS at 08:12

## 2023-08-17 RX ADMIN — CLONAZEPAM 0.5 MG: 0.5 TABLET ORAL at 09:49

## 2023-08-17 RX ADMIN — BUSPIRONE HYDROCHLORIDE 15 MG: 15 TABLET ORAL at 08:11

## 2023-08-17 RX ADMIN — Medication 10 MG: at 22:37

## 2023-08-17 RX ADMIN — TRIFLUOPERAZINE HYDROCHLORIDE 2 MG: 2 TABLET, FILM COATED ORAL at 08:11

## 2023-08-17 RX ADMIN — OLANZAPINE 10 MG: 5 TABLET, ORALLY DISINTEGRATING ORAL at 23:54

## 2023-08-17 RX ADMIN — DOXYCYCLINE HYCLATE 100 MG: 50 CAPSULE ORAL at 21:12

## 2023-08-17 RX ADMIN — NICOTINE POLACRILEX 4 MG: 4 GUM, CHEWING BUCCAL at 08:34

## 2023-08-17 RX ADMIN — OLANZAPINE 10 MG: 5 TABLET, ORALLY DISINTEGRATING ORAL at 15:52

## 2023-08-17 RX ADMIN — BUSPIRONE HYDROCHLORIDE 15 MG: 15 TABLET ORAL at 21:12

## 2023-08-17 ASSESSMENT — ACTIVITIES OF DAILY LIVING (ADL)
ADLS_ACUITY_SCORE: 42

## 2023-08-17 NOTE — PLAN OF CARE
"  Problem: Sleep Disturbance  Goal: Adequate Sleep/Rest  Outcome: Not Progressing   Goal Outcome Evaluation:    Patient appeared to sleep 5.5 hours this night shift.  Vistaril given at 0455.  Spoke of \"chest discomfort\" and VS done. Assessment done by ANTONETTE (RN). No other concerns and returned to sleep shortly after.  Will continue to monitor and support patient who appears comfortable at this time.                      "

## 2023-08-17 NOTE — PROGRESS NOTES
Patient was up at about 0445, requested for prn sleep medication, informed that it was a bit too late to offers sleep medications at 0450 as this might make her sleepy during the day, however patient has no trazodone on board and had received melatonin at 2246 yesterday. Patient was offered prn hydroxyzine 50 mg. Patient also complaints of chest pain rated 7/10. VS B/P 125/83  Resp. 18, Temp 97.2 02 sat 96% R/A. Patient was reassured and encouraged to rest in bed so the medication could work for him and let staff know if pain persists or worsen. Patient also requested for nicorette gum but was informed that would be at 0630. Patient demonstrated understanding, went to bed with no further complaints. Will continue to monitor and follow plan of care.    Earle Mancini DNP, RN.

## 2023-08-17 NOTE — PLAN OF CARE
Assessment/Intervention/Current Symtoms and Care Coordination:  Chart review and met with team, discussed pt progress, symptomology, and response to treatment.  Discussed the discharge plan and any potential impediments to discharge.     -Writer met with pt and pt expressed he was feeling better. Writer discussed provider placing an ECT consult and they would go through that process with him when they get the order. Patient reports feeling that would be helpful to him and expressing positive mood regarding ECT. Writer discussed waiting to submit referrals for IOP at this time.       Discharge Plan or Goal  Back to group [place]     Barriers to Discharge:  Symptom and medication stabilization     Referral Status:  TBD-DBT      Legal Status:  Committed   County: [facility]   File Number: [id number]   Start and expiration date of commitment: 8/14/23-2/14/24     Contacts:  : Cristy at [facility], [phone] (VAL signed)   [facility]: Deena at [facility], [phone] (VAL signed)   [facility] [place] Director and Nurse: Domenica [phone] (VAL signed)   CADI worker: Pretty Guzman at [employer], [phone] (VAL signed)   Psychotherapist: Joshua at [employer], [phone] (VAL signed)       Upcoming Meetings and Dates/Important Information and next steps:

## 2023-08-17 NOTE — PROGRESS NOTES
"[facility], [place]   Psychiatric Progress Note  Hospital Day: 4        Interim History:   The patient's care was discussed with the treatment team during the daily team meeting and/or staff's chart notes were reviewed.  Staff report patient reported active SI with intent on admission and was placed on SIO. Still not sena for safety. Staff report evidence of connection seeking behaviors. Made comments about wanting to strangle himself with the exercise bike, but notified staff before acting on this. Patient did not report any acute medical concerns or side effects. No behavioral issues overnight, including violent or aggressive behaviors. Patient did not require seclusion or restraints. Patient is exhibiting signs/sx of psychosis or haven. Patient did endorse suicidal ideation. Patient did not endorse HI. Patient is medication adherent. Patient is attending groups. Patient is not sleeping well. Patient is eating adequately. Patient is not attending to ADLs.    Upon interview, Prakash stated that suicidal thoughts are \"intense right now.\" He said that he has had command AH for several years, and is continuing to hear a single male voice repeatedly telling him to kill himself. He said that medication has not been helpful for command AH specifically. He also then inquired about when he could be discharged because \"the hospital isnt helping me. Can I be discharged today?\" I explained that today would likely not be a possibility but if he is able to contract for safety and maintain safe behaviors overnight off of SIO, it is possible to consider discharge tomorrow. He said that the SIO \"makes me feel comforted.\" Also explained indications for very invasive intervention. He said that he cannot be safe without an SIO because he is feeling \"impulsive\" and has thoughts of strangling himself with an exercise cord, and believes he would do this if he is not closely monitored. I informed him " "that we could still remove SIO monitoring if he would be willing to notify staff prior to acting on SI thoughts. He said that he would be unable to do so, and would \"find ways\" to strangle himself without SIO monitoring. We discussed starting Lithium to target suicidal ideation. He said that he would be willing to trial it.     Writer was informed by staff that patient wanted to speak with me again. He said that he was feeling \"unheard\" by this writer. He was \"surprised\" that I had considered discharge tomorrow, and that made him feel \"invalidated.\" He said that he was inquiring about discharge because he wanted to leave here and \"die.\" He repeatedly stated that he is feeling \"miserable,\" and is certainly in no position to be discharged from the hospital. He also agreed that long-term hospitalization is also not therapeutic for him. Writer validated how miserable he is feeling and that he is suffering right now. He thanked writer for coming back to his room to meet with him again, and is feeling reassured after writer confirmed that he would not be discharged if feeling unsafe.     Suicidal ideation: as above    Homicidal ideation: denies current or recent homicidal ideation or behaviors.    Psychotic symptoms: Patient reports command AH telling him to kill himself, Denies VH, paranoia.      Medication side effects reported: No significant side effects.    Acute medical concerns: none    Other issues reported by patient: Patient had no further questions or concerns.           Medications:      amLODIPine  10 mg Oral Daily    busPIRone  15 mg Oral TID    doxycycline hyclate  100 mg Oral BID    gabapentin  1,200 mg Oral TID    insulin glargine  15 Units Subcutaneous QAM AC    nicotine  1 patch Transdermal Daily    nicotine   Transdermal Q8H    omeprazole  20 mg Oral Daily    QUEtiapine  350 mg Oral At Bedtime    testosterone  20.25 mg Transdermal Daily    trifluoperazine  2 mg Oral BID          Allergies: " "    Allergies   Allergen Reactions    Haldol [Haloperidol] Other (See Comments)     Makes patient very angry and anxious    Adhesive Tape Hives    Percocet [Oxycodone-Acetaminophen] Nausea and Vomiting    Prednisone Other (See Comments) and Hives     Suicidal ideation    Risperidone Other (See Comments)    Tramadol Hcl Nausea and Vomiting    Droperidol Anxiety    Seroquel [Quetiapine] Palpitations     Spent 2 weeks in the hospital due to having seroquel, caused palpitations and QT prolongation          Labs:     Recent Results (from the past 24 hour(s))   Glucose by meter    Collection Time: 08/16/23 12:38 PM   Result Value Ref Range    GLUCOSE BY METER POCT 140 (H) 70 - 99 mg/dL   Glucose by meter    Collection Time: 08/16/23  5:13 PM   Result Value Ref Range    GLUCOSE BY METER POCT 139 (H) 70 - 99 mg/dL   Glucose by meter    Collection Time: 08/16/23 10:05 PM   Result Value Ref Range    GLUCOSE BY METER POCT 243 (H) 70 - 99 mg/dL   Glucose by meter    Collection Time: 08/17/23  7:41 AM   Result Value Ref Range    GLUCOSE BY METER POCT 161 (H) 70 - 99 mg/dL          Psychiatric Examination:     BP (!) 126/90   Pulse 96   Temp 97.7  F (36.5  C)   Resp 16   Ht 1.676 m (5' 6\")   Wt 105.8 kg (233 lb 4.8 oz)   SpO2 96%   BMI 37.66 kg/m    Weight is 233 lbs 4.8 oz  Body mass index is 37.66 kg/m .    Weight over time:  Vitals:    08/13/23 1812   Weight: 105.8 kg (233 lb 4.8 oz)       Orthostatic Vitals         Most Recent      Sitting Orthostatic /89 08/14 0924    Sitting Orthostatic Pulse (bpm) 94 08/14 0924    Standing Orthostatic /92 08/13 1600              Cardiometabolic risk assessment. 08/14/23      Reviewed patient profile for cardiometabolic risk factors    Date taken /Value  REFERENCE RANGE   Abdominal Obesity  (Waist Circumference)   See nursing flowsheet Women ?35 in (88 cm)   Men ?40 in (102 cm)      Triglycerides  Triglycerides   Date Value Ref Range Status   05/28/2023 240 (H) <150 mg/dL " Final   04/28/2021 317 (H) <150 mg/dL Final       ?150 mg/dL (1.7 mmol/L) or current treatment for elevated triglycerides   HDL cholesterol  HDL Cholesterol   Date Value Ref Range Status   04/28/2021 37 (L) >40 mg/dL Final     Direct Measure HDL   Date Value Ref Range Status   05/28/2023 35 (L) >=40 mg/dL Final   ]   Women <50 mg/dL (1.3 mmol/L) in women or current treatment for low HDL cholesterol  Men <40 mg/dL (1 mmol/L) in men or current treatment for low HDL cholesterol     Fasting plasma glucose (FPG) Lab Results   Component Value Date     08/14/2023     05/05/2023     05/05/2023     05/19/2021      FPG ?100 mg/dL (5.6 mmol/L) or treatment for elevated blood glucose   Blood pressure  BP Readings from Last 3 Encounters:   08/17/23 (!) 126/90   07/21/23 126/87   06/09/23 133/89    Blood pressure ?130/85 mmHg or treatment for elevated blood pressure   Family History  See family history     Mental Status Exam:  Appearance: awake, alert  Attitude:  cooperative  Eye Contact:  good  Mood:  depressed  Affect:  mood congruent, blunted  Speech:  clear, coherent  Language: fluent and intact in English  Psychomotor, Gait, Musculoskeletal:  no evidence of tardive dyskinesia, dystonia, or tics  Throught Process:  linear, disorganized and illogical  Associations:  no loose associations  Thought Content:  no evidence of suicidal ideation or homicidal ideation, auditory hallucinations present and no visual hallucinations present  Insight:  fair  Judgement:  fair  Oriented to:  time, person, and place  Attention Span and Concentration:  fair  Recent and Remote Memory:  fair  Fund of Knowledge:  appropriate           Precautions:     Behavioral Orders   Procedures    Code 1 - Restrict to Unit    Routine Programming     As clinically indicated    Self Injury Precaution    Status 15     Every 15 minutes.    Status Individual Observation     Patient SIO status reviewed with team/RN.  Please also refer to  RN/team documentation for add'l detail.    -SIO staff to monitor following which have contributed to patient being on SIO:  Active si with intent  -Possible interventions SIO staff could use to support patient's treatment progress:  Close monitoring   -When following observed, team will review discontinuation of SIO:  Absence of active SI and intent for 24 hours     Order Specific Question:   CONTINUOUS 24 hours / day     Answer:   5 feet     Order Specific Question:   Indications for SIO     Answer:   Suicide risk    Suicide precautions     Patients on Suicide Precautions should have a Combination Diet ordered that includes a Diet selection(s) AND a Behavioral Tray selection for Safe Tray - with utensils, or Safe Tray - NO utensils            Diagnoses:     Schizoaffective disorder, chronic condition with acute exacerbation (H)  PTSD (post-traumatic stress disorder)  Borderline personality disorder (H)  Suicidal ideation         Assessment & Plan:     Assessment and hospital summary:  Patient is a 33 year old with past history of schizoaffective disorder, borderline personality disorder and methamphetamine dependence in remission for 7 years who reports increased command auditory hallucinations of God telling him to kill himself.  He has also had 5 nights without sleep and been taking risks and been arrested twice in the last 2 weeks.  He most likely is in a mixed state episode with psychosis.  Dr. Torres stopped Adderall and increased Seroquel to 300mg at bedtime. Since then, we have optimized Seroquel. Pt has required SIO monitoring due to persistent active SI with intent. Buspar was increased to target anxiety. Open to a trial of Lithium.       Today's Changes:  Increase Seroquel to 400 mg this evening and consider further increases if tolerating  Start Lithium 600 mg at bedtime  Increase melatonin to target initial insomnia  Consider ECT consult, though R/B/A would thoroughly need to be reviewed with  patient.    Target psychiatric symptoms and interventions:  HOLDING Adderall  Buspar 15 mg TID  Gabapentin 1200 mg TID  Nicotine replacement  Seroquel 350 mg at bedtime --> 400 mg QHS  Risks, benefits, and alternatives discussed at length with patient.     Acute Medical Problems and Treatments:  Acute medical concerns:  Left shoulder pain:  Per IM note dated 8/16:   Brief medicine note     Medicine service following up for patient's troponin, which was checked for some left shoulder pain radiating to his chest.  Troponin returned at <6.  No plans for further monitoring at this time.  As pain is reproducible, suspect musculoskeletal pain.  Continue treatment with as needed ibuprofen, which has provided patient relief.  Please refer to consultation note on 8/15 for further details.        Medicine service to sign off at this time.  Please contact or consult us with any new medical questions or concerns.     Yolie Rodriguez PA-C    Type 2 diabetes mellitus  Patient is prescribed Lantus 15 units every morning and Ozempic weekly. He has not been compliant with this regimen for months. He states he believes that insulin is poison, that he was told this by someone. Most recent A1c in May was 9.1%. He has been refusing Lantus here. After some education and discussion, patient was willing to try taking the Lantus here.   - continue PTA Lantus 15 units q am  - hold Ozempic here  - hypoglycemia protocol  - glucose monitoring POC PRN  - check A1c  - recommend followup with PCP at discharge for continued management     Pertinent labs/imaging:  Reviewed    Behavioral/Psychological/Social:  - Encourage unit programming    Safety:  - Continue precautions as noted above  - Status 15 minute checks  - SIO in place for active SI with intent    Legal Status: Patient is committed    Disposition Plan   Reason for ongoing admission: poses an imminent risk to self  Discharge location: group home  Discharge Medications: not ordered  Follow-up  Appointments: not scheduled    Entered by: Tessa Mendoza MD on 8/17/2023 at 11:32 AM

## 2023-08-17 NOTE — PLAN OF CARE
"  Problem: Adult Behavioral Health Plan of Care  Goal: Adheres to Safety Considerations for Self and Others  Outcome: Progressing  Flowsheets (Taken 8/16/2023 2133)  Adheres to Safety Considerations for Self and Others: making progress toward outcome   Goal Outcome Evaluation:    Plan of Care Reviewed With: patient          Patient remains on 1:1 SIO to monitor for SI/SIB behaviors. Patient continues to report suicidal thoughts and with intent and plan to act by using cords to strangle self. Patient overheard another patient asking for the cord for the exercise bike and stated \"don't get that out\", stating they would feel unsafe if cords were accessible. Patient receptive to processing these thoughts with SIO staff and RN and sena for safety with plan to talk with staff before acting on thoughts.    Patient in the milieu all evening social mainly with staff, listening to music, and playing video games. Patient upon approach flat in affect though brightens with conversation. Patient continues to report high anxiety and at times agitation related to suicidal thoughts and command auditory hallucinations constantly telling him to kill himself. Patient stating medications only slightly effective and reports using coping mechanisms including talking with staff, music, and working on math problems to calm. Patient reporting stressors with roommate at group home that he has been in contact with due to them caring for his dog. He reports the roommate is often urging him to lie about his symptoms so that he can get discharged. Patient states he wants care and is thinking he may have seek out new group homes due to concerns with peers and drug use there. Patient is stating hopelessness with current care plan and medications stating he is hopeful he can do psychiatric testing and ultimately ECT to alleviate symptoms.    Patient cooperative with vital sign assessments which were stable. Patient cooperative with glucose " checks with no stated or observed physical symptoms. Patient reporting increased appetite and better food today with the low carbohydrate diet. Patient independent with requesting and accepting of HS medications with no stated or observed side effects. Patient requested PRN Klonopin and later PRN Zyprexa for increased anxiety and agitation related to suicidal thoughts and auditory hallucinations.     Patient continues to report left posterior arm pain 5/10, refusing any medications or interventions from RN.

## 2023-08-17 NOTE — PLAN OF CARE
"Problem: Suicide Risk  Goal: Absence of Self-Harm  Outcome: Not Progressing  Intervention: Assess Risk to Self and Maintain Safety  Behavior Management:   behavioral plan reviewed   impulse control promoted  Self-Harm Prevention:   environmental self-harm risks assessed   observed one-to-one  Patient remains on SIO 1:1 for mitigating suicidal thoughts. Patient continues to endorse suicidal thoughts 2/2 auditory hallucinations where voices are commanding him to \"kill myself\". Prakash reports the SI thoughts are intense but denies plan or intent. He verbalized feeling safe here and with the presence of his SIO staff. Writer provided support and reassurance. He denies that the voices are telling to harm others. No evidence of self-injurious behavior. Prakash endorses intrusive thoughts and high anxiety 8/10 and requested prn Clonazepam with some relief reported. Received Zyprexa 10 mg this shift to target voice. He denies depression, irritability and no evidence of agitation.    He is alert and oriented x3, calm and cooperative. Present in the milieu and appeared withdrawn from peers and intermittently interactive with staff. Patient attended and  actively participated in group. Patient has flat affect that is reactive upon approach; mood congruent with affect; speech is normal and no insight into his situation. Patient is eating, hydrating, and sleeping adequately.     Patient is medication compliant with no reminders. Medication side effects were not observed or reported this shift. Denies pain/physical discomfort. VSS BP (!) 126/90   Pulse 96   Temp 97.7  F (36.5  C)   Resp 16   Ht 1.676 m (5' 6\")   Wt 105.8 kg (233 lb 4.8 oz)   SpO2 96%   BMI 37.66 kg/m        Recent Labs   Lab 08/17/23  1229 08/17/23  0741 08/16/23  2205 08/16/23  1713 08/16/23  1238 08/16/23  0821   * 161* 243* 139* 140* 165*           "

## 2023-08-17 NOTE — PLAN OF CARE
"Problem: Suicide Risk  Goal: Absence of Self-Harm  Intervention: Assess Risk to Self and Maintain Safety  Behavior Management:   behavioral plan reviewed   impulse control promoted  Self-Harm Prevention:   environmental self-harm risks assessed   observed one-to-one    Patient remains on SIO 1:1 for safety. Patient continues to endorse suicidal thoughts 2/2 auditory hallucinations where voices are commanding him to \"kill myself\". Early in the shift, he engaged self-harm where he began to head bang on the wall in his room. No aberrant physical injuries noted upon assessment. Writer provided support and reassurance and educated patient on the use of available coping skills. Patient identifies music and coloring for his primary coping skills to lessen the intensity of the voices. Prakash came out of his room and sat in the lounge engaging coloring activity with his SIO staff while listening to music. Prior to the head banging episode, Prakash approached  the writer requesting Zyprexa 10 mg to target voices. He described the voices as male and stated that they are bothersome and irritating. Patient reports to the writer that the Zyprexa was not quick enough to help with the voices and he became \"frustrated\" and began head banging. Took a nap and was awaken ~ 2115 for meds, VS and BG check; cooperative with all. Took a shower after nap and reports that the intensity of the SI thoughts decreased as well as auditory hallucinations. He reports Zyprexa was efficacious     He denies that the voices are telling to harm others. Prakash continues to endorse intrusive thoughts and anxiety. He reports that the anxiety is improving this evening 3/10. He denies depression, irritability and no evidence of agitation.     He is alert and oriented x3, calm and cooperative. Present in the milieu intermittently and appeared withdrawn from peers and only interactive with staff. Patient has flat affect; mood congruent with affect; speech is " "normal and no insight into his situation. Patient is eating, hydrating, and sleeping adequately.     Patient is medication compliant with no reminders. Started lithium 600 mg this evening with no issue. Medication side effects were not observed or reported this shift. Denies pain/physical discomfort. VSS /82 (BP Location: Left arm, Patient Position: Supine)   Pulse 79   Temp 97.9  F (36.6  C) (Temporal)   Resp 16   Ht 1.676 m (5' 6\")   Wt 105.8 kg (233 lb 4.8 oz)   SpO2 97%   BMI 37.66 kg/m      Recent Labs   Lab 08/17/23  2126 08/17/23  1703 08/17/23  1229 08/17/23  0741 08/16/23  2205 08/16/23  1713   * 164* 166* 161* 243* 139*             "

## 2023-08-18 LAB
GLUCOSE BLDC GLUCOMTR-MCNC: 136 MG/DL (ref 70–99)
GLUCOSE BLDC GLUCOMTR-MCNC: 168 MG/DL (ref 70–99)
GLUCOSE BLDC GLUCOMTR-MCNC: 185 MG/DL (ref 70–99)
GLUCOSE BLDC GLUCOMTR-MCNC: 201 MG/DL (ref 70–99)

## 2023-08-18 PROCEDURE — 250N000013 HC RX MED GY IP 250 OP 250 PS 637: Performed by: PSYCHIATRY & NEUROLOGY

## 2023-08-18 PROCEDURE — 250N000013 HC RX MED GY IP 250 OP 250 PS 637: Performed by: EMERGENCY MEDICINE

## 2023-08-18 PROCEDURE — G0177 OPPS/PHP; TRAIN & EDUC SERV: HCPCS

## 2023-08-18 PROCEDURE — 250N000013 HC RX MED GY IP 250 OP 250 PS 637: Performed by: FAMILY MEDICINE

## 2023-08-18 PROCEDURE — 250N000011 HC RX IP 250 OP 636: Performed by: FAMILY MEDICINE

## 2023-08-18 PROCEDURE — 99233 SBSQ HOSP IP/OBS HIGH 50: CPT | Performed by: PSYCHIATRY & NEUROLOGY

## 2023-08-18 PROCEDURE — 124N000002 HC R&B MH UMMC

## 2023-08-18 RX ADMIN — GABAPENTIN 1200 MG: 600 TABLET, FILM COATED ORAL at 13:48

## 2023-08-18 RX ADMIN — BUSPIRONE HYDROCHLORIDE 15 MG: 15 TABLET ORAL at 08:29

## 2023-08-18 RX ADMIN — CLONAZEPAM 0.5 MG: 0.5 TABLET ORAL at 18:26

## 2023-08-18 RX ADMIN — NICOTINE POLACRILEX 4 MG: 4 GUM, CHEWING BUCCAL at 07:48

## 2023-08-18 RX ADMIN — TRIFLUOPERAZINE HYDROCHLORIDE 2 MG: 2 TABLET, FILM COATED ORAL at 08:29

## 2023-08-18 RX ADMIN — NICOTINE 1 PATCH: 21 PATCH, EXTENDED RELEASE TRANSDERMAL at 08:29

## 2023-08-18 RX ADMIN — BUSPIRONE HYDROCHLORIDE 15 MG: 15 TABLET ORAL at 13:48

## 2023-08-18 RX ADMIN — OMEPRAZOLE 20 MG: 20 CAPSULE, DELAYED RELEASE ORAL at 08:28

## 2023-08-18 RX ADMIN — INSULIN GLARGINE 15 UNITS: 100 INJECTION, SOLUTION SUBCUTANEOUS at 08:09

## 2023-08-18 RX ADMIN — DOXYCYCLINE HYCLATE 100 MG: 50 CAPSULE ORAL at 08:27

## 2023-08-18 RX ADMIN — AMLODIPINE BESYLATE 10 MG: 10 TABLET ORAL at 08:28

## 2023-08-18 RX ADMIN — BUSPIRONE HYDROCHLORIDE 15 MG: 15 TABLET ORAL at 21:06

## 2023-08-18 RX ADMIN — OLANZAPINE 5 MG: 5 TABLET, ORALLY DISINTEGRATING ORAL at 20:45

## 2023-08-18 RX ADMIN — NICOTINE POLACRILEX 4 MG: 4 GUM, CHEWING BUCCAL at 15:44

## 2023-08-18 RX ADMIN — GABAPENTIN 1200 MG: 600 TABLET, FILM COATED ORAL at 08:28

## 2023-08-18 RX ADMIN — NICOTINE POLACRILEX 4 MG: 4 GUM, CHEWING BUCCAL at 11:38

## 2023-08-18 RX ADMIN — NICOTINE POLACRILEX 4 MG: 4 GUM, CHEWING BUCCAL at 06:07

## 2023-08-18 RX ADMIN — DOXYCYCLINE HYCLATE 100 MG: 50 CAPSULE ORAL at 21:05

## 2023-08-18 RX ADMIN — TRIFLUOPERAZINE HYDROCHLORIDE 2 MG: 2 TABLET, FILM COATED ORAL at 21:06

## 2023-08-18 RX ADMIN — ONDANSETRON 4 MG: 4 TABLET, ORALLY DISINTEGRATING ORAL at 16:19

## 2023-08-18 RX ADMIN — NICOTINE POLACRILEX 4 MG: 4 GUM, CHEWING BUCCAL at 22:23

## 2023-08-18 RX ADMIN — Medication 10 MG: at 23:14

## 2023-08-18 RX ADMIN — NICOTINE POLACRILEX 4 MG: 4 GUM, CHEWING BUCCAL at 17:17

## 2023-08-18 RX ADMIN — HYDROXYZINE HYDROCHLORIDE 25 MG: 25 TABLET, FILM COATED ORAL at 16:19

## 2023-08-18 RX ADMIN — LITHIUM CARBONATE 600 MG: 300 TABLET, EXTENDED RELEASE ORAL at 21:05

## 2023-08-18 RX ADMIN — NICOTINE POLACRILEX 4 MG: 4 GUM, CHEWING BUCCAL at 13:48

## 2023-08-18 RX ADMIN — NICOTINE POLACRILEX 4 MG: 4 GUM, CHEWING BUCCAL at 09:09

## 2023-08-18 RX ADMIN — QUETIAPINE FUMARATE 400 MG: 400 TABLET ORAL at 21:06

## 2023-08-18 RX ADMIN — GABAPENTIN 1200 MG: 600 TABLET, FILM COATED ORAL at 21:06

## 2023-08-18 RX ADMIN — TESTOSTERONE 20.25 MG: 20.25 GEL TOPICAL at 08:28

## 2023-08-18 RX ADMIN — NICOTINE POLACRILEX 4 MG: 4 GUM, CHEWING BUCCAL at 20:36

## 2023-08-18 RX ADMIN — OLANZAPINE 5 MG: 5 TABLET, ORALLY DISINTEGRATING ORAL at 18:26

## 2023-08-18 ASSESSMENT — ACTIVITIES OF DAILY LIVING (ADL)
ADLS_ACUITY_SCORE: 42

## 2023-08-18 NOTE — DISCHARGE INSTRUCTIONS
Behavioral Discharge Planning and Instructions    Summary: You were admitted on 8/10/2023  due to Schizoaffective disorder and Suicidal Ideations.  ou were treated by Dr. Tessa Mendoza and discharged on 8/28/23 from [facility]    Main Diagnosis: Schizoaffective disorder, bipolar type, Borderline Personality Disorder, Schizoaffective disorder,    Commitment:  You were dually committed to the [facility] and the Commissioner of Human Services on 8/14/23 and you are being discharged on a Provisional Discharge Agreement which shall remain in effect for the duration of the Commitment which expires on 2/14/24.  You are also court ordered to take the medications that the doctor ordered for you.     Health Care Follow-up:     Primary Care Appointment with Narcisa Arreaga 9/1/23 @1 pm in person for medical care follow up.  [address] [place] [address] Phone: [phone]    Regine Last CNP, APRN-In Office appointment 9/26/2023 12:30 pm  [facility] [place] [id number], [place] [address]  Telephone: [phone]    [facility]-has sent you a portal email link. All information needs to be submitted in the portal by Friday September 1, 2023. DBT-PTSD scheduled 9/6/23 12pm-1pm in person. [address], [place] [address] [phone] fax#[phone]    Information will be faxed to your outpatient providers to ensure a healthy continuity of care for you.     Attend all scheduled appointments with your outpatient providers. Call at least 24 hours in advance if you need to reschedule an appointment to ensure continued access to your outpatient providers.     Referral Made:  [facility] confirmed receipt of referral and will reach out to you: If you need to contact the program Phone [phone]   Email: Katiuska Miramontes;   [email or web address]    [facility] offers  several treatment programs for adults with significant mental health challenges, including Adult Day Treatment and Group Therapy. [facility] currently offers Adult Day Treatment Programs at its [facility].  Adult Mental Health Day Treatment    In some cases for those with significant mental health challenges, a period of intensive daily treatment is necessary to bring about meaningful change. Adult Day Treatment focuses both on the process of recovery as well as on the development of skills clients can use to cope with mental health symptoms. We accept Medicare as well as other insurance and payment methods for these programs.    Adult Day Treatment Groups  [facility] runs a series of groups based on client needs: Connections Day Treatment - 12-week program w/ recommendation to attend aftercare support groups 1-2 times for 4 months. This treatment focuses on teaching skills to cope with mental health symptoms and lifestyle changes to support wellness. Illness management and recovery curriculum is used as well as support and validation. The program utilizes cognitive behavioral therapy, dialectical behavioral therapy, psycho education, and expressive therapies to teach and practice coping skills and wellness/lifestyles changes for managing impact of mental illness. Dialectical Behavior Therapy (DBT) Adult Day Treatment - 20-week program w/ recommendation to attend aftercare support groups 1-2 times for 4 months. DBT is an evidence-based comprehensive cognitive-behavioral treatment that emphasizes mindfulness, emotion regulation, interpersonal effectiveness and distress tolerance. DBT is helpful for clients struggling with suicidal thoughts or other self-damaging behavior Following graduation from our Connections or DBT Day Treatment program, all clients are encouraged to attend IOP aftercare groups. [email or web address] offers a variety of adult IOP groups multiple days per week in 60 to 90 minutes  sessions for mental health challenges. There are 8 to 12 people in a group at a time.     Please note: You do not have to complete Day Treatment to join IOP groups. PACE - This program meets once per week for 1 hour per day. This program best fits clients who are typically lower-functioning and benefits from a slower -paced program. This is NOT for the developmentally delayed, based on client s ability to process information, learn and retain skills. In this program you will learn DBT Skills and symptom management in a structured format. DBT Skills Aftercare - This program meets once per week for 1  hours. This program is best for adults who are completing a more intensive program schedule or those who struggle with ineffective decision making, emotional dysregulation, and tolerating distress. DBT Skills are reviewed and reinforced at each session. Group therapy is provided in a supportive, nonjudgmental environment. Some experience with DBT concepts is required. This is ideal for aftercare to DBT Adherent or DBT Day Treatment. Groups are either in-person or hybrid (both in person and virtual). Symptom Management - This group meets once per week for 1 or 1   hour (s) per day. This is a support-based group for client s struggling with mental health symptoms and co-occurring disorders. This group is focused on sharing experiences, problem solving, review of coping skills and general support. This group would benefit clients who are looking for more general peer support and review of skills learned in Day Treatment. Download an Adult Day Treatment/Group Therapy referral form. Adherent Adult DBT Skills Training - A group for clients aged 18 and older who struggle with suicidal ideation, self-harm, impulsive behaviors and/or frequent hospitalizations.  We support clients to learn skills to help them maintain personal safety, reduce problematic behaviors and improve their relationships.  This is an evidence based practice  that includes group, individual therapy, and  coaching calls to support clients to make lasting changes.    For more information about the Adherent Adult DBT Skills Training Program, please call [phone].  Referrals and Appointments      Major Treatments, Procedures and Findings:  You were provided with: a psychiatric assessment, assessed for medical stability, medication evaluation and/or management, group therapy, individual therapy, CD evaluation/assessment, milieu management, and medical interventions    Symptoms to Report: feeling more aggressive, increased confusion, losing more sleep, mood getting worse, or thoughts of suicide    Early warning signs can include: increased depression or anxiety sleep disturbances increased thoughts or behaviors of suicide or self-harm     Safety and Wellness:  Take all medicines as directed.  Make no changes unless your doctor suggests them.      Follow treatment recommendations.  Refrain from alcohol and non-prescribed drugs.  Ask your support system to help you reduce your access to items that could harm yourself or others. Items could include:  Firearms  Medicines (both prescribed and over-the-counter)  Knives and other sharp objects  Ropes and like materials  Car keys  If there is a concern for safety, call 911. If there is a concern for safety, call 911.    Resources:   Crisis Intervention: 868.242.2488 or 088-261-5820 (TTY: 599.583.8422).  Call anytime for help.  National [employer] on Mental Illness (www.[place].jhonatan.org): 199.918.9654 or 289-191-7768.  Suicide Awareness Voices of Education (SAVE) (www.save.org): 574-164-ICUH (1343)  National Suicide Prevention Line (www.mentalhealthmn.org): 856-032-SHFQ (5238)  Mental Health Consumer/Survivor Network of [place] (www.mhcsn.net): 273.346.6008 or 266-839-9816  Mental Health Association of [place] (www.mentalhealth.org): 337.256.5435 or 970-352-5844  Self- Management and Recovery Training., SMART-- Toll free: 984.594.7550   "www.[employer]  [facility] Crisis (COPE) Response - Adult 288 083-2691  Text 4 Life: txt \"LIFE\" to 41557 for immediate support and crisis intervention  Crisis text line: Text \"[place]\" to 189267. Free, confidential, 24/7.  Crisis Intervention: 701.332.3070 or 959-102-1666. Call anytime for help.   The [employer] Project: A support network for LGBTQ youth providing crisis intervention and suicide prevention, 24/7 by phone (call 1-257.108.7745), text (text \"START\" to 893023), or instant message (https://www.[employer]rproject.org/get-help/).       GILMA [place] (National [employer] on Mental Illness) improves the lives of children and adults with mental illnesses and their families by providing free classes on mental illnesses and support groups for adults with mental illnesses, parents and family members. For more information: Phone: 607.700.4411 Toll free: 6-382-IPUK-Pixia Website: www.namMarine Life Research.orghttp://www.namPayoffps.org/      General Medication Instructions:   See your medication sheet(s) for instructions.   Take all medicines as directed.  Make no changes unless your doctor suggests them.   Go to all your doctor visits.  Be sure to have all your required lab tests. This way, your medicines can be refilled on time.  Do not use any drugs not prescribed by your doctor.  Avoid alcohol.    Advance Directives:   Scanned document on file with [facility]? No scanned doc  Is document scanned? Pt states no documents  Honoring Choices Your Rights Handout: Informed and given  Was more information offered? Pt declined    The Treatment team has appreciated the opportunity to work with you. If you have any questions or concerns about your recent admission, you can contact the unit which can receive your call 24 hours a day, 7 days a week. They will be able to get in touch with a Provider if needed. The unit number is [phone] .     "

## 2023-08-18 NOTE — PLAN OF CARE
"Problem: Suicide Risk  Goal: Absence of Self-Harm  Outcome: Progressing  Intervention: Assess Risk to Self and Maintain Safety  Behavior Management:   behavioral plan reviewed   impulse control promoted  Self-Harm Prevention:   environmental self-harm risks assessed   observed one-to-one    Patient remains on SIO 1:1 for safety. Patient continues to endorse suicidal thoughts but no intent or plan to act on it. Pt complained suicidal thoughts comes to him in a form of a male voice and it is active but pt denied of any urges. Pt also presented that intrusive thoughts are better today than previous days. Upon assessment no self injurious behavior noted. Pt stated coloring, pacing the hallways, talking to staff and listening to music to be effective against the voices. Pt took a shower last night and stated he did not need to shower for the day shift. Pt appears bright, calm, collective and and attendant to ADL's. Eating and hydrating adequately. Diet order changed back to regular.     Pt denies having voices to harm others. Pt continues to be anxious rating 7/10. Pt denies visual hallucinations,  depression, and paranoia. No acute behavioral concern.    Pt is med compliant with no reminders. Reports to have restless legs last night and said he had trouble sleeping which he added has improved this morning. No other med side effects noted. He is tolerating well with the lithium. Pt denies pain and physical discomfort. Blood glucose check was slightly higher than previously noted (201). IM provider is aware of and increased insulin dose to 17u daily. No acute medical concern noted this shift. VSS /84 (BP Location: Right arm, Patient Position: Sitting, Cuff Size: Adult Regular)   Pulse 91   Temp 97.7  F (36.5  C) (Temporal)   Resp 16   Ht 1.676 m (5' 6\")   Wt 105.8 kg (233 lb 4.8 oz)   SpO2 96%   BMI 37.66 kg/m        Recent Labs   Lab 08/18/23  1202 08/18/23  1057 08/18/23  0806 08/17/23  2126 08/17/23  1703 " 08/17/23  1229   * 185* 201* 137* 164* 166*

## 2023-08-18 NOTE — PROGRESS NOTES
Brief Medicine Note    Alerted by nursing that patient has concerns about his blood sugars.  Please see consultation note from 8/16 by medicine for more details of patient's diabetes.  Upon review of his blood sugars, he has had fair glycemic control.  His fasting sugars are 150s to 160s each morning.  We will increase Lantus from his home dose of 15 units to 17 units daily.  Upon discharge, likely appropriate for patient to go back to his PTA dose of Lantus, assuming he will also be on Ozempic at home (nonformulary here), although he was not compliant with this medication previously.  Patient also requested increase of his carb allowance per meals.  Per nursing, he has been eating well of his meals.  Recommend continuing with 60 g carb allowance with each meal, and consideration of discontinuation of his Glucerna supplement.    Recommendations communicated to nursing. Medicine service will sign off. Please contact or consult us with any new medical questions or concerns.       Yolie Rodriguez PA-C  [facility] Internal Medicine [facility]  8/18/2023 11:08 AM

## 2023-08-18 NOTE — PROVIDER NOTIFICATION
08/18/23 0614   Sleep/Rest   Sleep/Rest/Relaxation no problem identified   Sleep Hygiene Promotion regular sleep pattern promoted;relaxation techniques promoted;noise level reduced   Night Time # Hours 4 hours   Day/Evening Time Hours resting in bed     Patient is on SIO  5 feet for SI. He came up to writer at start of shift requesting  for and received Zyprexa 10 mg for increasing thoughts of SI and command hallucination. He stayed up for some time doing cross word puzzle until he fell asleep.Writer went into patient's room to check his blood sugar at 0200. Patient was startled and screamed out. Writer explained to patient that she was just trying to check his blood sugar. Patient he refused staff from checking his blood sugar that he wanted to sleep.Staff will continue to monitor and educate on the importance of checking his blood sugar at night.

## 2023-08-18 NOTE — PLAN OF CARE
Assessment/Intervention/Current Symtoms and Care Coordination:  Chart review and met with team, discussed pt progress, symptomology, and response to treatment.  Discussed the discharge plan and any potential impediments to discharge. Patient started on Lithium. Patient is being encouraged to trial other treatment medications before ECT is consulted.      -Writer met with pt and pt expressed he was feeling better discussed tough experience last evening and validated pt's emotions.Writer discussed provider's recommendation of trialing lithium and are clozapine if necessary before referring for ECT. Patient expressed understanding is agreeable to the process. Writer discussed continuing the search for outpatient services. Writer obtained VAL for [facility]-was contacted and they have DBT starting 9/2023 and [facility] for IOP or PHP services.        Discharge Plan or Goal  Back to group [facility]     Barriers to Discharge:  Symptom and medication stabilization     Referral Status:  [facility]  [facility]  [facility]      Legal Status:  Committed   [facility]: [facility]   File Number: [id number]   Start and expiration date of commitment: 8/14/23-2/14/24     Contacts:  : Cristy at [facility], [phone] (VAL signed)   [facility]: Deena at [facility], [phone] (VAL signed)   [facility] Group Home Director and Nurse: Domenica [phone] (VAL signed)   CADI worker: Pretty Guzman at [facility], [phone] (VAL signed)   Psychotherapist: Joshua at [facility], [phone] (VAL signed)       Upcoming Meetings and Dates/Important Information and next steps:

## 2023-08-18 NOTE — PLAN OF CARE
Task completed:     Care Coordinator called and spoke with Coordinator Santa from [facility], [facility] ([phone], option 1) to schedule DBT Therapy, and was informed there is a 2 month wait. CC received verbal confirmation that pt is placed on the waiting list and will be contacted @ [phone] once an Intake becomes available.     CC updated CTC    Brianna Rojas  Care Coordinator  [email or web address]  [phone]

## 2023-08-18 NOTE — CARE PLAN
Topic Group (9250-1385)  Pt reports feeling better today as compared to yesterday.  States using coping skills to prevent boredom, feeling good about discharge planning with IOP and DBT in the works, and discussed importance of having structure.  Pt brought up feeling that he has limited education on fairly new diagnosis on diabetes, felt educator that came up to unit during stay was rushed, and he would benefit from further education.  Pt reports he is also considering not renewing he medical marijuana card, and is wondering how marijuana use is affecting his psych meds, and was encouraged to talk to his provider about this on Monday.    OT clinic (3332-2602)  Continued work with planning and problem solving on task project.  Pt was very pleased how his work is turning out which has been very motivating for him.  While working, brought up having a degree in biology, and having done volunteer work for the [employer] which was enjoyable.  Discussed maybe doing something like this again in the future.    Leisure group ([id number])  Pt requested not necessarily an exercise group, though a more active game.  Pt and peer participated in a gross motor toss game, and later several rounds of a card game.  Friendly and social throughout activities.  Discussed importance of remaining active throughout the weekend - activities will be available for use with staff.

## 2023-08-18 NOTE — PROGRESS NOTES
"[facility], [place]   Psychiatric Progress Note  Hospital Day: 5        Interim History:   The patient's care was discussed with the treatment team during the daily team meeting and/or staff's chart notes were reviewed.  Staff report patient engaged in SIB via head banging. He was given Zyprexa prior to this incident but stated that it was not effective soon enough.  Continues to report active SI with intent. Patient did not report any acute medical concerns or side effects. No behavioral issues overnight, including violent or aggressive behaviors. Patient did not require seclusion or restraints. Patient is exhibiting signs/sx of psychosis, including command AH with less intensity. Does not appear to be responding to internal stimuli. Anxiety has improved. Patient did endorse suicidal ideation. Patient did not endorse HI. Patient is medication adherent. Patient is attending groups. Patient is not sleeping well, but also has poor sleep hygiene. Patient is eating adequately. Patient is not attending to ADLs.    Upon interview, Prakash stated that he continues to experience \"intense thoughts of suicide, and I have a hard time using my coping strategies.\" He added \"so I started to head bang last night.\" When asked what coping strategies were ineffective for him, he said \"art, music, basketball.\" We discussed the importance of using TIPP skills during periods of significant distress as the alternative coping strategies are likely not going to be effective when he is in a flight or fight mode state. He was somewhat familiar with these skills and said that he will attempt to utilize them more frequently. When asked why he would be unable to notify staff if feeling unsafe or if these thoughts intensify, he said that he is impulsive. He mentioned that he attempted suicide while in ED and engaged in head banging because of his impulsivity and inability to seek staff's assistance. We discussed SIO. " "He said that he feels it needs to remain in place over the weekend. He added \"from a safety standpoint, If I have the chance [to attempt suicide], it would happen again. I cannot stop the impulse from happening.\" He agreed to practice TIPP skills over the weekend.     Prakash again inquired about ECT, and asked that it be \"left on the table because I am desperate to try anything that might help, even a little.\" We again discussed that it is most appropriate to trial Lithium first prior to pursuing ECT as this has not been trialed. He again indicated that he does not feel depressed, but rather his anxiety builds, which leads to command AH which then results in an increase in suicdal thinking.     Prakash shared that he is anxious about returning to his group home. When asked why, he said \"There are days when I go without getting my meds, fighting, drug use; its pretty toxic.\"       Suicidal ideation: as above    Homicidal ideation: denies current or recent homicidal ideation or behaviors.    Psychotic symptoms: Patient reports command AH telling him to kill himself, Denies VH, paranoia.      Medication side effects reported: No significant side effects.    Acute medical concerns: none    Other issues reported by patient:   An increase in insulin  Addition of metformin  Switch in diet to regular diet (despite having concerns about higher blood sugars); states that current diet is \"unrealistic because I wont be doing that at home\"  \"Restless legs last night\"         Medications:      amLODIPine  10 mg Oral Daily    busPIRone  15 mg Oral TID    doxycycline hyclate  100 mg Oral BID    gabapentin  1,200 mg Oral TID    insulin glargine  15 Units Subcutaneous QAM AC    lithium ER  600 mg Oral At Bedtime    nicotine  1 patch Transdermal Daily    nicotine   Transdermal Q8H    omeprazole  20 mg Oral Daily    QUEtiapine  400 mg Oral At Bedtime    testosterone  20.25 mg Transdermal Daily    trifluoperazine  2 mg Oral BID          " "Allergies:     Allergies   Allergen Reactions    Haldol [Haloperidol] Other (See Comments)     Makes patient very angry and anxious    Adhesive Tape Hives    Percocet [Oxycodone-Acetaminophen] Nausea and Vomiting    Prednisone Other (See Comments) and Hives     Suicidal ideation    Risperidone Other (See Comments)    Tramadol Hcl Nausea and Vomiting    Droperidol Anxiety    Seroquel [Quetiapine] Palpitations     Spent 2 weeks in the hospital due to having seroquel, caused palpitations and QT prolongation          Labs:     Recent Results (from the past 24 hour(s))   Glucose by meter    Collection Time: 08/17/23 12:29 PM   Result Value Ref Range    GLUCOSE BY METER POCT 166 (H) 70 - 99 mg/dL   Glucose by meter    Collection Time: 08/17/23  5:03 PM   Result Value Ref Range    GLUCOSE BY METER POCT 164 (H) 70 - 99 mg/dL   Glucose by meter    Collection Time: 08/17/23  9:26 PM   Result Value Ref Range    GLUCOSE BY METER POCT 137 (H) 70 - 99 mg/dL   Glucose by meter    Collection Time: 08/18/23  8:06 AM   Result Value Ref Range    GLUCOSE BY METER POCT 201 (H) 70 - 99 mg/dL          Psychiatric Examination:     /84 (BP Location: Right arm, Patient Position: Sitting, Cuff Size: Adult Regular)   Pulse 91   Temp 97.7  F (36.5  C) (Temporal)   Resp 16   Ht 1.676 m (5' 6\")   Wt 105.8 kg (233 lb 4.8 oz)   SpO2 96%   BMI 37.66 kg/m    Weight is 233 lbs 4.8 oz  Body mass index is 37.66 kg/m .    Weight over time:  Vitals:    08/13/23 1812   Weight: 105.8 kg (233 lb 4.8 oz)       Orthostatic Vitals         Most Recent      Sitting Orthostatic /89 08/14 0924    Sitting Orthostatic Pulse (bpm) 94 08/14 0924    Standing Orthostatic /92 08/13 1600              Cardiometabolic risk assessment. 08/14/23      Reviewed patient profile for cardiometabolic risk factors    Date taken /Value  REFERENCE RANGE   Abdominal Obesity  (Waist Circumference)   See nursing flowsheet Women ?35 in (88 cm)   Men ?40 in (102 cm) " "     Triglycerides  Triglycerides   Date Value Ref Range Status   05/28/2023 240 (H) <150 mg/dL Final   04/28/2021 317 (H) <150 mg/dL Final       ?150 mg/dL (1.7 mmol/L) or current treatment for elevated triglycerides   HDL cholesterol  HDL Cholesterol   Date Value Ref Range Status   04/28/2021 37 (L) >40 mg/dL Final     Direct Measure HDL   Date Value Ref Range Status   05/28/2023 35 (L) >=40 mg/dL Final   ]   Women <50 mg/dL (1.3 mmol/L) in women or current treatment for low HDL cholesterol  Men <40 mg/dL (1 mmol/L) in men or current treatment for low HDL cholesterol     Fasting plasma glucose (FPG) Lab Results   Component Value Date     08/14/2023     05/05/2023     05/05/2023     05/19/2021      FPG ?100 mg/dL (5.6 mmol/L) or treatment for elevated blood glucose   Blood pressure  BP Readings from Last 3 Encounters:   08/18/23 119/84   07/21/23 126/87   06/09/23 133/89    Blood pressure ?130/85 mmHg or treatment for elevated blood pressure   Family History  See family history     Mental Status Exam:  Appearance: awake, alert  Attitude:  cooperative  Eye Contact:  good  Mood:  \"anxious\"  Affect:  mood congruent, blunted  Speech:  clear, coherent  Language: fluent and intact in English  Psychomotor, Gait, Musculoskeletal:  no evidence of tardive dyskinesia, dystonia, or tics  Throught Process:  linear, disorganized and illogical  Associations:  no loose associations  Thought Content:  no evidence of suicidal ideation or homicidal ideation, auditory hallucinations present and no visual hallucinations present  Insight:  fair  Judgement:  fair  Oriented to:  time, person, and place  Attention Span and Concentration:  fair  Recent and Remote Memory:  fair  Fund of Knowledge:  appropriate           Precautions:     Behavioral Orders   Procedures    Code 1 - Restrict to Unit    Routine Programming     As clinically indicated    Self Injury Precaution    Status 15     Every 15 minutes.    " Status Individual Observation     Patient SIO status reviewed with team/RN.  Please also refer to RN/team documentation for add'l detail.    -SIO staff to monitor following which have contributed to patient being on SIO:  Active si with intent  -Possible interventions SIO staff could use to support patient's treatment progress:  Close monitoring   -When following observed, team will review discontinuation of SIO:  Absence of active SI and intent for 24 hours     Order Specific Question:   CONTINUOUS 24 hours / day     Answer:   5 feet     Order Specific Question:   Indications for SIO     Answer:   Suicide risk    Suicide precautions     Patients on Suicide Precautions should have a Combination Diet ordered that includes a Diet selection(s) AND a Behavioral Tray selection for Safe Tray - with utensils, or Safe Tray - NO utensils            Diagnoses:     Schizoaffective disorder, chronic condition with acute exacerbation (H)  PTSD (post-traumatic stress disorder)  Borderline personality disorder (H)  Suicidal ideation         Assessment & Plan:     Assessment and hospital summary:  Patient is a 33 year old with past history of schizoaffective disorder, borderline personality disorder and methamphetamine dependence in remission for 7 years who reports increased command auditory hallucinations of God telling him to kill himself.  He has also had 5 nights without sleep and been taking risks and been arrested twice in the last 2 weeks.  He most likely is in a mixed state episode with psychosis.  Dr. Torres stopped Adderall and increased Seroquel to 300mg at bedtime. Since then, we have optimized Seroquel. Pt has required SIO monitoring due to persistent active SI with intent. Buspar was increased to target anxiety. Open to a trial of Lithium, which was initiated on 8/17. Patient engaged in head banging on evening of 8/17. Unable to implement healthier coping strategies per his report. Appears to be exhibiting  maladaptive and avoidant behaviors; did express concerns about current living situation at group home and desire to explore alternative housing options. Does appear to have connection seeking behaviors, and has been very resistant to discontinuing SIO. Unable to contract for safety at this time.       Today's Changes:  Discussed patient's concerns about blood glucose, diet, and current medication regimen with internal medicine over the phone. Please see her note today for details.     Continue SIO for now though reassess need daily    Educated patient on use of TIPP skills. Will continue to encourage implementation of nonpharmacologic coping strategies to target emotional dysregulation, SIB, and impulsivity    Target psychiatric symptoms and interventions:  HOLDING Adderall  Lithium 600 mg QHS  Buspar 15 mg TID  Gabapentin 1200 mg TID  Nicotine replacement  Seroquel 400 mg at bedtime    Risks, benefits, and alternatives discussed at length with patient.     Acute Medical Problems and Treatments:  Acute medical concerns:  Left shoulder pain:  Per IM note dated 8/16:   Brief medicine note     Medicine service following up for patient's troponin, which was checked for some left shoulder pain radiating to his chest.  Troponin returned at <6.  No plans for further monitoring at this time.  As pain is reproducible, suspect musculoskeletal pain.  Continue treatment with as needed ibuprofen, which has provided patient relief.  Please refer to consultation note on 8/15 for further details.        Medicine service to sign off at this time.  Please contact or consult us with any new medical questions or concerns.     Yolie Rodriguez PA-C    Type 2 diabetes mellitus  Patient is prescribed Lantus 15 units every morning and Ozempic weekly. He has not been compliant with this regimen for months. He states he believes that insulin is poison, that he was told this by someone. Most recent A1c in May was 9.1%. He has been refusing Lantus  here. After some education and discussion, patient was willing to try taking the Lantus here.   - continue PTA Lantus 15 units q am  - hold Ozempic here  - hypoglycemia protocol  - glucose monitoring POC PRN  - check A1c  - recommend followup with PCP at discharge for continued management    UPDATE 8/18:    Brief Medicine Note     Alerted by nursing that patient has concerns about his blood sugars.  Please see consultation note from 8/16 by medicine for more details of patient's diabetes.  Upon review of his blood sugars, he has had fair glycemic control.  His fasting sugars are 150s to 160s each morning.  We will increase Lantus from his home dose of 15 units to 17 units daily.  Upon discharge, likely appropriate for patient to go back to his PTA dose of Lantus, assuming he will also be on Ozempic at home (nonformulary here), although he was not compliant with this medication previously.  Patient also requested increase of his carb allowance per meals.  Per nursing, he has been eating well of his meals.  Recommend continuing with 60 g carb allowance with each meal, and consideration of discontinuation of his Glucerna supplement.     Recommendations communicated to nursing. Medicine service will sign off. Please contact or consult us with any new medical questions or concerns.      Yolie Rodriguez PA-C    Pertinent labs/imaging:  Reviewed    Behavioral/Psychological/Social:  - Encourage unit programming    Safety:  - Continue precautions as noted above  - Status 15 minute checks  - SIO in place for active SI with intent    Legal Status: Patient is committed    Disposition Plan   Reason for ongoing admission: poses an imminent risk to self  Discharge location: group home  Discharge Medications: not ordered  Follow-up Appointments: not scheduled    Entered by: Tessa Mendoza MD on 8/18/2023 at 9:18 AM       Video-Visit Details    Type of service:  Video Visit    Video Start Time (time video started): 1020    Video  End Time (time video stopped): 1041    Originating Location (pt. Location): Other station 32    Distant Location (provider location): Provider remote location    Mode of Communication:  Video Conference via [employer]    Physician has received verbal consent for a Video Visit from the patient? Yes      Tessa Mendoza MD

## 2023-08-19 LAB
GLUCOSE BLDC GLUCOMTR-MCNC: 225 MG/DL (ref 70–99)
GLUCOSE BLDC GLUCOMTR-MCNC: 238 MG/DL (ref 70–99)
GLUCOSE BLDC GLUCOMTR-MCNC: 241 MG/DL (ref 70–99)
GLUCOSE BLDC GLUCOMTR-MCNC: 285 MG/DL (ref 70–99)

## 2023-08-19 PROCEDURE — 250N000013 HC RX MED GY IP 250 OP 250 PS 637: Performed by: PSYCHIATRY & NEUROLOGY

## 2023-08-19 PROCEDURE — 124N000002 HC R&B MH UMMC

## 2023-08-19 PROCEDURE — 99232 SBSQ HOSP IP/OBS MODERATE 35: CPT | Performed by: PHYSICIAN ASSISTANT

## 2023-08-19 PROCEDURE — 250N000011 HC RX IP 250 OP 636: Performed by: FAMILY MEDICINE

## 2023-08-19 PROCEDURE — 250N000013 HC RX MED GY IP 250 OP 250 PS 637: Performed by: FAMILY MEDICINE

## 2023-08-19 PROCEDURE — 250N000013 HC RX MED GY IP 250 OP 250 PS 637: Performed by: PHYSICIAN ASSISTANT

## 2023-08-19 PROCEDURE — 250N000012 HC RX MED GY IP 250 OP 636 PS 637: Performed by: PHYSICIAN ASSISTANT

## 2023-08-19 RX ORDER — IBUPROFEN 200 MG
200 TABLET ORAL
Status: COMPLETED | OUTPATIENT
Start: 2023-08-19 | End: 2023-08-19

## 2023-08-19 RX ORDER — NICOTINE POLACRILEX 4 MG
15-30 LOZENGE BUCCAL
Status: DISCONTINUED | OUTPATIENT
Start: 2023-08-19 | End: 2023-08-19

## 2023-08-19 RX ORDER — IBUPROFEN 100 MG/1
400 TABLET, CHEWABLE ORAL EVERY 8 HOURS PRN
Status: DISCONTINUED | OUTPATIENT
Start: 2023-08-19 | End: 2023-08-19

## 2023-08-19 RX ORDER — DEXTROSE MONOHYDRATE 25 G/50ML
25-50 INJECTION, SOLUTION INTRAVENOUS
Status: DISCONTINUED | OUTPATIENT
Start: 2023-08-19 | End: 2023-08-19

## 2023-08-19 RX ORDER — ACETAMINOPHEN 325 MG/1
650 TABLET ORAL EVERY 4 HOURS PRN
Status: DISCONTINUED | OUTPATIENT
Start: 2023-08-19 | End: 2023-08-28 | Stop reason: HOSPADM

## 2023-08-19 RX ADMIN — NICOTINE POLACRILEX 4 MG: 4 GUM, CHEWING BUCCAL at 06:37

## 2023-08-19 RX ADMIN — LITHIUM CARBONATE 600 MG: 300 TABLET, EXTENDED RELEASE ORAL at 20:07

## 2023-08-19 RX ADMIN — BUSPIRONE HYDROCHLORIDE 15 MG: 15 TABLET ORAL at 08:00

## 2023-08-19 RX ADMIN — DOXYCYCLINE HYCLATE 100 MG: 50 CAPSULE ORAL at 20:07

## 2023-08-19 RX ADMIN — IBUPROFEN 200 MG: 200 TABLET, FILM COATED ORAL at 09:00

## 2023-08-19 RX ADMIN — BUSPIRONE HYDROCHLORIDE 15 MG: 15 TABLET ORAL at 20:07

## 2023-08-19 RX ADMIN — GABAPENTIN 1200 MG: 600 TABLET, FILM COATED ORAL at 08:00

## 2023-08-19 RX ADMIN — DOXYCYCLINE HYCLATE 100 MG: 50 CAPSULE ORAL at 08:00

## 2023-08-19 RX ADMIN — CLONAZEPAM 0.5 MG: 0.5 TABLET ORAL at 11:16

## 2023-08-19 RX ADMIN — NICOTINE POLACRILEX 4 MG: 4 GUM, CHEWING BUCCAL at 12:23

## 2023-08-19 RX ADMIN — GABAPENTIN 1200 MG: 600 TABLET, FILM COATED ORAL at 13:37

## 2023-08-19 RX ADMIN — AMLODIPINE BESYLATE 10 MG: 10 TABLET ORAL at 08:00

## 2023-08-19 RX ADMIN — TRIFLUOPERAZINE HYDROCHLORIDE 2 MG: 2 TABLET, FILM COATED ORAL at 20:07

## 2023-08-19 RX ADMIN — ONDANSETRON 4 MG: 4 TABLET, ORALLY DISINTEGRATING ORAL at 07:51

## 2023-08-19 RX ADMIN — ACETAMINOPHEN 650 MG: 325 TABLET, FILM COATED ORAL at 13:37

## 2023-08-19 RX ADMIN — NICOTINE 1 PATCH: 21 PATCH, EXTENDED RELEASE TRANSDERMAL at 08:00

## 2023-08-19 RX ADMIN — NICOTINE POLACRILEX 4 MG: 4 GUM, CHEWING BUCCAL at 11:05

## 2023-08-19 RX ADMIN — BUSPIRONE HYDROCHLORIDE 15 MG: 15 TABLET ORAL at 13:37

## 2023-08-19 RX ADMIN — OMEPRAZOLE 20 MG: 20 CAPSULE, DELAYED RELEASE ORAL at 07:40

## 2023-08-19 RX ADMIN — TRIFLUOPERAZINE HYDROCHLORIDE 2 MG: 2 TABLET, FILM COATED ORAL at 08:00

## 2023-08-19 RX ADMIN — NICOTINE POLACRILEX 4 MG: 4 GUM, CHEWING BUCCAL at 08:59

## 2023-08-19 RX ADMIN — NICOTINE POLACRILEX 4 MG: 4 GUM, CHEWING BUCCAL at 20:14

## 2023-08-19 RX ADMIN — QUETIAPINE FUMARATE 400 MG: 400 TABLET ORAL at 20:08

## 2023-08-19 RX ADMIN — OLANZAPINE 10 MG: 5 TABLET, ORALLY DISINTEGRATING ORAL at 18:22

## 2023-08-19 RX ADMIN — NICOTINE POLACRILEX 4 MG: 4 GUM, CHEWING BUCCAL at 14:39

## 2023-08-19 RX ADMIN — INSULIN ASPART 1 UNITS: 100 INJECTION, SOLUTION INTRAVENOUS; SUBCUTANEOUS at 22:50

## 2023-08-19 RX ADMIN — NICOTINE POLACRILEX 4 MG: 4 GUM, CHEWING BUCCAL at 18:04

## 2023-08-19 RX ADMIN — GABAPENTIN 1200 MG: 600 TABLET, FILM COATED ORAL at 20:07

## 2023-08-19 RX ADMIN — NICOTINE POLACRILEX 4 MG: 4 GUM, CHEWING BUCCAL at 22:15

## 2023-08-19 RX ADMIN — TESTOSTERONE 20.25 MG: 20.25 GEL TOPICAL at 08:00

## 2023-08-19 ASSESSMENT — ACTIVITIES OF DAILY LIVING (ADL)
ADLS_ACUITY_SCORE: 42

## 2023-08-19 NOTE — CONSULTS
"[facility]  Consult Note - Hospitalist Service  Date of Admission:  8/10/2023  Consult Requested by: Zandra Pompa MD  Reason for Consult: headache, without PRNs        Assessment & Plan  Ayana \"Randall\" VINCENT Prasad is a 33 year old adult admitted on 8/13/2023 to inpatient psychiatry through ED where he initially presented on 8/10/23. He has a history of type 2 diabetes.     Medicine service consulted for the above-headaches, also following for type 2 diabetes mellitus management.      Acute headache  Patient reports headaches daily for the last few days.  He states he feels some posterior aspect of his head, rated 7/10 at its worst.  He feels that he is withdrawing from Adderall.  He denies any recent dizziness, vision changes, lightheadedness.  He did try 200 mg one-time Motrin ordered by psychiatrist this morning, he states that this did not offer any improvement.  He also does endorse increased anxiety, fatigue, and some mild nausea.  He is agreeable to trying as needed Tylenol to start.  Patient does state that he takes Excedrin at home for headaches.  - start 650 mg Tylenol PRN Q4H  - considered motrin, but there is concern that it will increase lithium levels, thus held at this time.     Type 2 diabetes mellitus  Patient is prescribed Lantus 15 units every morning and Ozempic weekly at baseline. He has not been compliant with this regimen for months. He states he believes that insulin is poison, that he was told this by someone. Most recent A1c in May was 9.1%.  A1c checked and is 8.8% here.  Patient was initially refusing Lantus here. After some education and discussion, patient was willing to try taking the Lantus here.   - Increase Lantus from 15 units daily to 17 units daily, his fasting blood sugars remain elevated  -Start low-dose sliding scale insulin, due to BG >250. D/w patient and he is agreeable  - Plan to resume Ozempic after discharge  - hypoglycemia " "protocol  - glucose monitoring POC PRN  - recommend followup with PCP at discharge for continued management       Medicine service will follow peripherally to monitor headache and diabetes management.         The patient's care was discussed with the Bedside Nurse, psychiatry, and patient.        Yolie Rodriguez PA-C  Hospitalist Service  Securely message with [employer] (more info)  Text page via [facility] Paging/Directory   ______________________________________________________________________        Chief Complaint   Headache     History is obtained from the patient           History of Present Illness  Ayana \"Randall\" VINCENT Prasad is a 33 year old adult who is seen on the psychiatric unit for a medical evaluation. Patient reports headaches daily for the last few days.  He states he feels some posterior aspect of his head, rated 7/10 at its worst.  He feels that he is withdrawing from Adderall.  He denies any recent dizziness, vision changes, lightheadedness.  He did try 200 mg one-time Motrin ordered by psychiatrist this morning, he states that this did not offer any improvement.  He also does endorse increased anxiety, fatigue, and some mild nausea.  He is agreeable to trying as needed Tylenol to start.  Patient does state that he takes Excedrin at home for headaches.        Past Medical History        Past Medical History:   Diagnosis Date    ADHD (attention deficit hyperactivity disorder)      Bipolar 1 disorder      Borderline personality disorder      Cauda equina syndrome      Chronic low back pain      Depression      Diabetes mellitus, type 2 (H) 1/19/2023    GERD (gastroesophageal reflux disease)      h/o TBI (traumatic brain injury)      Hypertension, unspecified type 12/16/2021    Marginal corneal ulcer, left 07/17/2015    Nephrolithiasis      obesity      Polysubstance abuse - methamphetamine, hallucinagen, heroin, marijuana       currently in remission    PONV (postoperative nausea and vomiting)      PTSD " (post-traumatic stress disorder)           Past Surgical History         Past Surgical History:   Procedure Laterality Date    BACK SURGERY   12/24/2016    BACK SURGERY - For Cauda Equina Syndrome   12/24/2016    COLONOSCOPY        COMBINED CYSTOSCOPY, INSERT STENT URETER(S) Left 8/30/2018     Procedure: COMBINED CYSTOSCOPY, INSERT STENT URETER(S);  Cystoscopy With Left Stent Placement;  Surgeon: Kiran Ulrich MD;  Location: [facility] OR    COMBINED CYSTOSCOPY, RETROGRADES, EXCHANGE STENT URETER(S) Left 10/14/2018     Procedure: COMBINED CYSTOSCOPY, RETROGRADES, EXCHANGE STENT URETER(S);  Cystoscopy and removal of left-sided stent.;  Surgeon: Stiven Olivo MD;  Location:  OR    COMBINED CYSTOSCOPY, RETROGRADES, URETEROSCOPY, INSERT STENT   1/6/2014     Procedure: COMBINED CYSTOSCOPY, RETROGRADES, URETEROSCOPY, INSERT STENT;  Cystyoscopy place left ureteral stent;  Surgeon: Jaun Kimble MD;  Location: [facility] OR    COMBINED CYSTOSCOPY, RETROGRADES, URETEROSCOPY, INSERT STENT Left 10/23/2018     Procedure: Video cystoscopy, left ureteroscopy with stone extraction;  Surgeon: Bull Mast MD;  Location:  OR    CYSTOSCOPY, URETEROSCOPY, COMBINED Right 9/23/2015     Procedure: COMBINED CYSTOSCOPY, URETEROSCOPY;  Surgeon: ROME Jett MD;  Location: [facility] OR    ENT SURGERY        ESOPHAGOSCOPY, GASTROSCOPY, DUODENOSCOPY (EGD), COMBINED   4/8/2013     Procedure: COMBINED ESOPHAGOSCOPY, GASTROSCOPY, DUODENOSCOPY (EGD), BIOPSY SINGLE OR MULTIPLE;  Gastroscopy;  Surgeon: Peter Pickard MD;  Location: [facility] GI    ESOPHAGOSCOPY, GASTROSCOPY, DUODENOSCOPY (EGD), COMBINED Left 10/28/2014     Procedure: COMBINED ESOPHAGOSCOPY, GASTROSCOPY, DUODENOSCOPY (EGD), BIOPSY SINGLE OR MULTIPLE;  Surgeon: Narcisa Ramirez MD;  Location:  OR    ESOPHAGOSCOPY, GASTROSCOPY, DUODENOSCOPY (EGD), COMBINED N/A 12/24/2018     Procedure: COMBINED ESOPHAGOSCOPY, GASTROSCOPY, DUODENOSCOPY (EGD), BIOPSY SINGLE OR  MULTIPLE;  Surgeon: Sonu Verduzco MD;  Location: [facility] GI    INJECT EPIDURAL TRANSFORAMINAL LUMBAR / SACRAL EA ADDITIONAL LEVEL Left 3/18/2021     Procedure: Left L5/S1 transforaminal injection for selective L5 nerve root block;  Surgeon: Eliza Pagan MD;  Location: [facility] OR    LAPAROSCOPIC CHOLECYSTECTOMY   11/20/2014     [facility], Dr. Ramirez    LASER HOLMIUM LITHOTRIPSY URETER(S), INSERT STENT, COMBINED   4/2/2014     Procedure: COMBINED CYSTOSCOPY, URETEROSCOPY, LASER HOLMIUM LITHOTRIPSY URETER(S), INSERT STENT;  Cystoscopy,Left Ureteral Stent Removal,Left Ureteroscopy with Laser Lithotripsy,Left Ureter Stent Placement;  Surgeon: ROME Jett MD;  Location: [facility] OR    Transurethral stone resection   03/11/2011               Medications   Prescriptions Prior to Admission           Medications Prior to Admission   Medication Sig Dispense Refill Last Dose    amLODIPine (NORVASC) 10 MG tablet Take 1 tablet (10 mg) by mouth daily 30 tablet 0 8/8/2023    [START ON 9/7/2023] amphetamine-dextroamphetamine (ADDERALL XR) 25 MG 24 hr capsule Take 1 capsule (25 mg) by mouth daily for 30 days 30 capsule 0 8/10/2023    busPIRone (BUSPAR) 10 MG tablet Take 1 tablet (10 mg) by mouth 3 times daily 90 tablet 2 8/10/2023    clonazePAM (KLONOPIN) 0.5 MG tablet Take 0.5 mg by mouth daily          doxycycline monohydrate (MONODOX) 100 MG capsule Take 1 capsule (100 mg) by mouth 2 times daily 60 capsule 0 8/10/2023    gabapentin (NEURONTIN) 600 MG tablet Take 2 tablets (1,200 mg) by mouth 3 times daily 180 tablet 0 8/10/2023    insulin glargine (LANTUS PEN) 100 UNIT/ML pen Inject 15 Units Subcutaneous every morning (before breakfast) Take 15 units daily. Take half dose, 7 units, on days you do not eat 15 mL 0 More than a month    melatonin 5 MG tablet Take 1 tablet (5 mg) by mouth At Bedtime 30 tablet 0 8/9/2023    omeprazole (PRILOSEC) 20 MG DR capsule Take 1 capsule (20 mg) by mouth daily 30 capsule 0 8/10/2023     ondansetron (ZOFRAN ODT) 4 MG ODT tab Take 1 tablet (4 mg) by mouth daily as needed for nausea 30 tablet 0 8/9/2023    QUEtiapine (SEROQUEL) 200 MG tablet Take 1 tablet (200 mg) by mouth daily 30 tablet 1 8/9/2023    Semaglutide, 1 MG/DOSE, (OZEMPIC) 4 MG/3ML pen Inject 1 mg Subcutaneous every 7 days 3 mL 1 More than a month    testosterone (ANDROGEL 1.62 % PUMP) 20.25 MG/ACT gel Place 2 Pump onto the skin daily for 30 days Apply from dispenser to clean, dry, intact skin of the upper arms and shoulders. 75 g 3 8/10/2023    tretinoin (RETIN-A) 0.05 % external cream Apply topically At Bedtime Pea-sized amount to whole face 45 g 0 8/9/2023    ACE/ARB/ARNI NOT PRESCRIBED (INTENTIONAL) Please choose reason not prescribed from choices below.          amphetamine-dextroamphetamine (ADDERALL XR) 25 MG 24 hr capsule Take 1 capsule (25 mg) by mouth daily 30 capsule 0      [START ON 10/7/2023] amphetamine-dextroamphetamine (ADDERALL XR) 25 MG 24 hr capsule Take 1 capsule (25 mg) by mouth daily for 30 days 30 capsule 0      blood glucose (NO BRAND SPECIFIED) test strip Use to test blood sugar one times daily and as needed. To accompany: Blood Glucose Monitor Brands: per insurance. 100 strip 3      Continuous Blood Gluc  (FREESTYLE COLTEN 2 READER) [facility] Use to read blood sugars as per 's instructions. 1 each 0      Continuous Blood Gluc Sensor (FREESTYLE COLTEN 2 SENSOR) [facility] Use to read blood sugars per 's instructions. Change sensor every 14 days. 6 each 0      thin (NO BRAND SPECIFIED) lancets Use with lanceting device to check blood sugars once per day. To accompany: Blood Glucose Monitor Brands: per insurance. 100 each 3                       Review of Systems   The 5 point Review of Systems is negative other than noted in the HPI.            Physical Exam  Vital Signs: Temp: 98.2  F (36.8  C) Temp src: Temporal BP: (!) 129/90 Pulse: 99   Resp: 16 SpO2: 96 % O2 Device: None (Room air)     Weight: 233 lbs 4.8 oz     GENERAL: adult seen sitting and ambulating without difficulty in unit milieu. NAD.   NEURO / PSYCH: Alert, converses appropriately. Flat affect. No focal deficits. Moves all extremities.   HEENT: Anicteric sclera. PERRL.   CV: RRR. S1, S2. No murmurs appreciated.  2+ left and right radial pulse, equal bilaterally.  RESPIRATORY: Effort normal. Lungs CTAB with no wheezing, rales, rhonchi.   MSK: no gross deformities  SKIN: No jaundice. No rashes or lesions to exposed areas.     Medical Decision Making         45 MINUTES SPENT BY ME on the date of service doing chart review, history, exam, documentation & further activities per the note.             Data

## 2023-08-19 NOTE — PROGRESS NOTES
Patient approached the writer at the start of the shift c/o headache. He requested medication for the headache pain and had no available medications. On call provider paged and place on time prn Mortin 200 mg and IM consult.

## 2023-08-19 NOTE — PROGRESS NOTES
Pt continues to be SIO 1:1 For suicidal ideation. She slept through the shift without any problems. Closely monitored patient. She remained safe. Will continue to monitor patient.

## 2023-08-19 NOTE — PLAN OF CARE
"Problem: Suicide Risk  Goal: Absence of Self-Harm  Outcome: Progressing     Patient is alert and oriented x3, calm and cooperative. Patient was present in the milieu and appeared social and interactive with peers. Patient attended and  actively participated in group. Patient has a flat affect that is reactive upon interaction; mood remain anxious; has normal speech and some insight into his situation. Patient continues to endorse suicidal thoughts 2/2 auditory hallucinations where voices are commanding him to \"kill myself\". Prakash reports the SI thoughts are more passive this today with no plan or intent. He verbalized feeling safe here and with the presence of his SIO staff. Writer provided support and reassurance. He denies that the voices are telling to harm others. No evidence of self-injurious behavior. Prakash endorses intrusive thoughts and high anxiety 8/10 and requested prn Clonazepam with some relief reported later rating anxiety 2/10. He denies depression, irritability and no evidence of agitation. No acute behavioral concern. Patient is eating, hydrating, and sleeping adequately.     Reports c/o headache at the start of the shift. Denies dizziness, lightheadedness. Recieved Motrin 200 mg administered with no relief. IM consulted and they placed Tylenol order and later received it with some relief reported. C/o nausea and received  PRN Zofran with some relief.     Patient is medication compliant with no reminders. Medication side effects were not observed or reported this shift. Denies pain/physical discomfort. VSS /84 (Patient Position: Sitting, Cuff Size: Adult Regular)   Pulse 93   Temp 97.8  F (36.6  C) (Oral)   Resp 16   Ht 1.676 m (5' 6\")   Wt 105.8 kg (233 lb 4.8 oz)   SpO2 94%   BMI 37.66 kg/m        Recent Labs   Lab 08/19/23  1207 08/19/23  0736 08/18/23  1730 08/18/23  1202 08/18/23  1057 08/18/23  0806   * 285* 136* 168* 185* 201*         "

## 2023-08-19 NOTE — PLAN OF CARE
"  Problem: Adult Behavioral Health Plan of Care  Goal: Adheres to Safety Considerations for Self and Others  Outcome: Not Progressing   Goal Outcome Evaluation:    Patient continues to endorse having active thoughts of wanting to harm himself and doesn't want to contract for safety. He is on SIO for active self injuries thoughts. Patient denies having thoughts of wanting to harm others. During this shift patient was given PRN hydroxyzine, Zyprexa, and Clonazepam. Patient had a visitor this evening and states the visit went well.     Patient came to the nursing station and states \" I Just want to die can you just kill me, I keep on having racing thoughts\". He then requested for his night time medication and was medication compliant.     He is on a blood sugar check QID. His blood sugar pre-dinner was 136 and before bed time was 250.    His vitals are stable except his pulse was elevated but he was expressing being anxious. Patient came to the nursing station and states that he was feeling better.    Blood pressure 135/88, pulse 103, temperature 97.8  F (36.6  C), temperature source Oral, resp. rate 16, height 1.676 m (5' 6\"), weight 105.8 kg (233 lb 4.8 oz), SpO2 94 %, not currently breastfeeding.    "

## 2023-08-20 LAB
GLUCOSE BLDC GLUCOMTR-MCNC: 141 MG/DL (ref 70–99)
GLUCOSE BLDC GLUCOMTR-MCNC: 210 MG/DL (ref 70–99)
GLUCOSE BLDC GLUCOMTR-MCNC: 230 MG/DL (ref 70–99)
GLUCOSE BLDC GLUCOMTR-MCNC: 247 MG/DL (ref 70–99)

## 2023-08-20 PROCEDURE — 250N000013 HC RX MED GY IP 250 OP 250 PS 637: Performed by: FAMILY MEDICINE

## 2023-08-20 PROCEDURE — 250N000013 HC RX MED GY IP 250 OP 250 PS 637: Performed by: PSYCHIATRY & NEUROLOGY

## 2023-08-20 PROCEDURE — 250N000013 HC RX MED GY IP 250 OP 250 PS 637: Performed by: EMERGENCY MEDICINE

## 2023-08-20 PROCEDURE — 250N000013 HC RX MED GY IP 250 OP 250 PS 637: Performed by: PHYSICIAN ASSISTANT

## 2023-08-20 PROCEDURE — 124N000002 HC R&B MH UMMC

## 2023-08-20 RX ORDER — POLYETHYLENE GLYCOL 3350 17 G/17G
17 POWDER, FOR SOLUTION ORAL DAILY
Status: DISCONTINUED | OUTPATIENT
Start: 2023-08-20 | End: 2023-08-28 | Stop reason: HOSPADM

## 2023-08-20 RX ADMIN — TRIFLUOPERAZINE HYDROCHLORIDE 2 MG: 2 TABLET, FILM COATED ORAL at 19:59

## 2023-08-20 RX ADMIN — QUETIAPINE FUMARATE 400 MG: 400 TABLET ORAL at 19:59

## 2023-08-20 RX ADMIN — Medication 10 MG: at 22:06

## 2023-08-20 RX ADMIN — INSULIN ASPART 1 UNITS: 100 INJECTION, SOLUTION INTRAVENOUS; SUBCUTANEOUS at 22:07

## 2023-08-20 RX ADMIN — NICOTINE POLACRILEX 4 MG: 4 GUM, CHEWING BUCCAL at 07:47

## 2023-08-20 RX ADMIN — NICOTINE POLACRILEX 4 MG: 4 GUM, CHEWING BUCCAL at 16:37

## 2023-08-20 RX ADMIN — TESTOSTERONE 20.25 MG: 20.25 GEL TOPICAL at 07:41

## 2023-08-20 RX ADMIN — NICOTINE 1 PATCH: 21 PATCH, EXTENDED RELEASE TRANSDERMAL at 07:41

## 2023-08-20 RX ADMIN — OLANZAPINE 10 MG: 5 TABLET, ORALLY DISINTEGRATING ORAL at 13:26

## 2023-08-20 RX ADMIN — NICOTINE POLACRILEX 4 MG: 4 GUM, CHEWING BUCCAL at 14:17

## 2023-08-20 RX ADMIN — HYDROXYZINE HYDROCHLORIDE 50 MG: 50 TABLET, FILM COATED ORAL at 12:24

## 2023-08-20 RX ADMIN — NICOTINE POLACRILEX 4 MG: 4 GUM, CHEWING BUCCAL at 22:10

## 2023-08-20 RX ADMIN — NICOTINE POLACRILEX 4 MG: 4 GUM, CHEWING BUCCAL at 09:09

## 2023-08-20 RX ADMIN — NICOTINE POLACRILEX 4 MG: 4 GUM, CHEWING BUCCAL at 12:39

## 2023-08-20 RX ADMIN — TRIFLUOPERAZINE HYDROCHLORIDE 2 MG: 2 TABLET, FILM COATED ORAL at 08:02

## 2023-08-20 RX ADMIN — GABAPENTIN 1200 MG: 600 TABLET, FILM COATED ORAL at 07:42

## 2023-08-20 RX ADMIN — OLANZAPINE 5 MG: 5 TABLET, ORALLY DISINTEGRATING ORAL at 22:06

## 2023-08-20 RX ADMIN — BUSPIRONE HYDROCHLORIDE 15 MG: 15 TABLET ORAL at 14:11

## 2023-08-20 RX ADMIN — BUSPIRONE HYDROCHLORIDE 15 MG: 15 TABLET ORAL at 07:43

## 2023-08-20 RX ADMIN — CLONAZEPAM 0.5 MG: 0.5 TABLET ORAL at 17:01

## 2023-08-20 RX ADMIN — NICOTINE POLACRILEX 4 MG: 4 GUM, CHEWING BUCCAL at 18:30

## 2023-08-20 RX ADMIN — AMLODIPINE BESYLATE 10 MG: 10 TABLET ORAL at 07:42

## 2023-08-20 RX ADMIN — ACETAMINOPHEN 650 MG: 325 TABLET, FILM COATED ORAL at 07:47

## 2023-08-20 RX ADMIN — POLYETHYLENE GLYCOL 3350 17 G: 17 POWDER, FOR SOLUTION ORAL at 09:21

## 2023-08-20 RX ADMIN — BUSPIRONE HYDROCHLORIDE 15 MG: 15 TABLET ORAL at 19:59

## 2023-08-20 RX ADMIN — DOXYCYCLINE HYCLATE 100 MG: 50 CAPSULE ORAL at 19:59

## 2023-08-20 RX ADMIN — ACETAMINOPHEN, ASPIRIN, CAFFEINE 1 TABLET: 250; 65; 250 TABLET, FILM COATED ORAL at 10:09

## 2023-08-20 RX ADMIN — GABAPENTIN 1200 MG: 600 TABLET, FILM COATED ORAL at 14:10

## 2023-08-20 RX ADMIN — NICOTINE POLACRILEX 4 MG: 4 GUM, CHEWING BUCCAL at 06:29

## 2023-08-20 RX ADMIN — ACETAMINOPHEN 650 MG: 325 TABLET, FILM COATED ORAL at 18:00

## 2023-08-20 RX ADMIN — DOXYCYCLINE HYCLATE 100 MG: 50 CAPSULE ORAL at 07:42

## 2023-08-20 RX ADMIN — NICOTINE POLACRILEX 4 MG: 4 GUM, CHEWING BUCCAL at 20:03

## 2023-08-20 RX ADMIN — LITHIUM CARBONATE 600 MG: 300 TABLET, EXTENDED RELEASE ORAL at 19:59

## 2023-08-20 RX ADMIN — GABAPENTIN 1200 MG: 600 TABLET, FILM COATED ORAL at 19:59

## 2023-08-20 RX ADMIN — OMEPRAZOLE 20 MG: 20 CAPSULE, DELAYED RELEASE ORAL at 07:41

## 2023-08-20 ASSESSMENT — ACTIVITIES OF DAILY LIVING (ADL)
ADLS_ACUITY_SCORE: 42

## 2023-08-20 NOTE — PLAN OF CARE
Pt asleep at start of shift.     Breathing quiet and unlabored when sleeping.     Pt had no c/o pain or discomfort during the HS.     Appears to have slept 6.25 hours.     Pt continues on 1:1 SIO/5 ft space distance.     Refused 0200 BG check.     Goal Outcome Evaluation:  Problem: Adult Behavioral Health Plan of Care  Goal: Adheres to Safety Considerations for Self and Others  Intervention: Develop and Maintain Individualized Safety Plan  Recent Flowsheet Documentation  Taken 8/20/2023 0000 by Nancy Dietz, RN  Safety Measures:   safety rounds completed   1:1 observation maintained  Goal: Absence of New-Onset Illness or Injury  Intervention: Identify and Manage Fall Risk  Recent Flowsheet Documentation  Taken 8/20/2023 0000 by Nancy Dietz, RN  Safety Measures:   safety rounds completed   1:1 observation maintained     Problem: Sleep Disturbance  Goal: Adequate Sleep/Rest  Outcome: Progressing

## 2023-08-20 NOTE — PROGRESS NOTES
Brief Medicine Note    Following up on headaches and DM.       Updates today:   - per nursing, patient states Tylenol has helped some with his headache, he had resolution last night but pain is returned this morning.   - add PRN Excedrin daily, patient states this was most effective for his pain at home.   - Glycemic control fair to suboptimal. Fasting glucose elevated this am. Increase Lantus again to 20 units daily.     Acute headache  Patient reports headaches daily for the last few days.  He states he feels some posterior aspect of his head, rated 7/10 at its worst.  He feels that he is withdrawing from Adderall.  He denies any recent dizziness, vision changes, lightheadedness.  He did try 200 mg one-time Motrin ordered by psychiatrist this morning, he states that this did not offer any improvement.  He also does endorse increased anxiety, fatigue, and some mild nausea.  He is agreeable to trying as needed Tylenol to start.  Patient does state that he takes Excedrin at home for headaches.  - continue 650 mg Tylenol PRN Q4H  - start Excedrin daily PRN  - considered motrin, but there is concern that it will increase lithium levels, thus held at this time.     Type 2 diabetes mellitus  Patient is prescribed Lantus 15 units every morning and Ozempic weekly at baseline. He has not been compliant with this regimen for months. He states he believes that insulin is poison, that he was told this by someone. Most recent A1c in May was 9.1%.  A1c checked and is 8.8% here.  Patient was initially refusing Lantus here. After some education and discussion, patient was willing to try taking the Lantus here.   - Increase Lantus from 17 units to 20 units daily  - Continue low-dose sliding scale insulin, consider increase to medium dosing  - Plan to resume Ozempic after discharge  - hypoglycemia protocol  - glucose monitoring POC PRN  - recommend followup with PCP at discharge for continued management      Medicine service will  continue to follow peripherally for monitoring / management of the above.       Yolie Rodriguez PA-C  [facility]  8/20/2023 9:44 AM

## 2023-08-20 NOTE — PLAN OF CARE
"Problem: Suicide Risk  Goal: Absence of Self-Harm  Outcome: Progressing  Intervention: Assess Risk to Self and Maintain Safety  Behavior Management:   behavioral plan reviewed   impulse control promoted  Self-Harm Prevention:   environmental self-harm risks assessed   observed one-to-one    Patient is alert & oriented x3; calm and cooperative. Visible in the milieu; minimally interactive with peers and mostly keeps to himself sitting in the lounge engaging in coloring activity. Patient continues to endorse SI thoughts in the context of auditory hallucinations commanding him to kill himself. He reports this morning that the urges to engage self-harm is less tense than previously reported. He later approached the writer and reports he is not feeling safe in his room and asked his SIO staff to lock out of his room for safety. When writer asked to describe the thoughts, he stated that he is feeling \"uncomfortable\" in his brain and refused to talk about what kind of urges he has that prompted him to as staff to lock his room. He later approached another RN and was administered Zyprexa 10 mg for increased \"thoughts of committing suicide\". When writer asked about his thoughts of suicide, he said to the writer \"help me kill myself. He denies thought of SIB and no evidence of SIB. He is not agreeing to safety.  Reports moderate anxiety and depression. He stated that \"...may be I am depressed; I am isolating and sleeping a lot.\" He doesn't present as depressed. He requested and received prn Hydroxyzine 50 mg with report of no relief. Denies other mental health symptoms. Presents with flat affect; anxious mood and insight remains poor. No acute behavioral concern this shift.     Patient frequently approaches the nursing staff with complaints and requests. Patient reports headache 7/10 at the start of the shift and Tylenol 650 was administered with no relief. Later IM provider place an Excedrin with report of relief rating 2/10 " "upon reassessment. Patient approached writer ~ 11:20 reporting having BM mixed with blood \"like a period blood, and I don't have my period.\" Writer encouraged him to have a nurse take a look first before flashing for any abnormally output fluids including vomit, BM or urine.     Insulin latus increased to 20 units daily. Patient attended to ADLs; changed bedding linens and scrubs; stated will shower later. He is eating, hydrating and sleeping adequately. Patient is medication compliant with no reminders. Medication side effects were not observed or reported this shift. Continues to report headache pain; denies lightheadedness, weakness or dizziness and any other physical discomfort. No acute medical concern. VSS /81 (BP Location: Left arm)   Pulse 99   Temp 97.9  F (36.6  C) (Temporal)   Resp 16   Ht 1.676 m (5' 6\")   Wt 105.8 kg (233 lb 4.8 oz)   SpO2 94%   BMI 37.66 kg/m      PRN's given this shift:     Last 24H PRN:     acetaminophen (TYLENOL) tablet 650 mg, 650 mg at 08/20/23 0747    aspirin-acetaminophen-caffeine (EXCEDRIN MIGRAINE) per tablet 1 tablet, 1 tablet at 08/20/23 1009    hydrOXYzine (ATARAX) tablet 25 mg, 25 mg at 08/18/23 1619 **OR** hydrOXYzine (ATARAX) tablet 50 mg, 50 mg at 08/20/23 1224    nicotine polacrilex (NICORETTE) gum 4 mg, 4 mg at 08/20/23 1417    OLANZapine zydis (zyPREXA) ODT tab 5-10 mg, 10 mg at 08/20/23 1326      Plan is to continue SIO 1:1 for safety.     "

## 2023-08-20 NOTE — PLAN OF CARE
"Goal Outcome Evaluation:    Plan of Care Reviewed With: patient    Overall Patient Progress: no change     Patient remains on SIO 1:1 for safety. Patient continues to endorse suicidal thoughts 2/2 auditory hallucinations where voices are commanding him to \"kill myself\". Patient took a nap at shift change and woke up when dinner arrived. After dinner, Prakash reports strong \"urges\" to self-harm but denies plan or intent. Writer encouraged him the use of coping skills and he agree to pace the hallway with his SIO staff and then engaged coloring activities. Later when reassessed, he reports decreased intensity of urges to engage self-harm and added that he is feeling safe here and with the presence of his SIO staff. Writer provided support and reassurance. He denies that the voices are telling to harm others. No evidence of self-injurious behavior. Prakash endorses intrusive thoughts and moderate anxiety 5/10. Administered Zyprexa 10 mg per patient's request to target voices and some relief reported. He denies depression, irritability and no evidence of agitation.    He is alert and oriented x3, calm and cooperative. Present in the milieu and appeared withdrawn from peers, but interactive with staff. Patient has flat affect that is reactive upon approach; mood congruent with affect; speech is normal and no insight into his situation. Patient is eating, hydrating, and sleeping adequately.     Patient is medication compliant with no reminders. Medication side effects were not observed or reported this shift. Denies pain/physical discomfort. VSS /81 (BP Location: Left arm)   Pulse 99   Temp 97.9  F (36.6  C) (Temporal)   Resp 16   Ht 1.676 m (5' 6\")   Wt 105.8 kg (233 lb 4.8 oz)   SpO2 94%   BMI 37.66 kg/m      IM provider met with patient this morning and initiated insulin sliding scale with no orders in place. Writer then contacted IM again this evening and evening provider placed insulin sliding scale " orders. 1 unit of insulin administered at bedtime per protocol.    Recent Labs   Lab 08/19/23  2203 08/19/23  1747 08/19/23  1207 08/19/23  0736 08/18/23  1730 08/18/23  1202   * 225* 241* 285* 136* 168*

## 2023-08-20 NOTE — PLAN OF CARE
"  Problem: Adult Behavioral Health Plan of Care  Goal: Adheres to Safety Considerations for Self and Others  Outcome: Not Progressing   Goal Outcome Evaluation:    Plan of Care Reviewed With: patient         Patient continues to have active thoughts of wanting harm himself and states \" I want to die\". Patient was given PRN Klonopin to help with anxiety. Patient denies having any thoughts of wanting to harm others, visual hallucinations but does endorse auditory hallucinations, anxiety and depression.     Patient's blood pressure before dinner was 141 and was given 1 units of Insulin sliding scale, and before bed time it was 230 and he was given 1 units of of Insulin sliding scale. Patient requested for PRN Melatonin to help with sleep. He also requested for PRN Zyprexa. He took his night time medication.    Patient has been out on the unit socializing with peers and staff. Patient presents with labile affect at times. Vitals are stable. Patient continues to be on SIO for self injuries thoughts.    Blood pressure 124/84, pulse 105, temperature 97.7  F (36.5  C), temperature source Temporal, resp. rate 16, height 1.676 m (5' 6\"), weight 105.8 kg (233 lb 4.8 oz), SpO2 94 %, not currently breastfeeding.             "

## 2023-08-21 LAB
GLUCOSE BLDC GLUCOMTR-MCNC: 198 MG/DL (ref 70–99)
GLUCOSE BLDC GLUCOMTR-MCNC: 236 MG/DL (ref 70–99)
GLUCOSE BLDC GLUCOMTR-MCNC: 282 MG/DL (ref 70–99)
GLUCOSE BLDC GLUCOMTR-MCNC: 308 MG/DL (ref 70–99)

## 2023-08-21 PROCEDURE — 250N000013 HC RX MED GY IP 250 OP 250 PS 637: Performed by: PSYCHIATRY & NEUROLOGY

## 2023-08-21 PROCEDURE — 250N000013 HC RX MED GY IP 250 OP 250 PS 637: Performed by: STUDENT IN AN ORGANIZED HEALTH CARE EDUCATION/TRAINING PROGRAM

## 2023-08-21 PROCEDURE — 250N000013 HC RX MED GY IP 250 OP 250 PS 637: Performed by: FAMILY MEDICINE

## 2023-08-21 PROCEDURE — 250N000013 HC RX MED GY IP 250 OP 250 PS 637: Performed by: PHYSICIAN ASSISTANT

## 2023-08-21 PROCEDURE — H2032 ACTIVITY THERAPY, PER 15 MIN: HCPCS

## 2023-08-21 PROCEDURE — 124N000002 HC R&B MH UMMC

## 2023-08-21 PROCEDURE — 99232 SBSQ HOSP IP/OBS MODERATE 35: CPT | Performed by: PSYCHIATRY & NEUROLOGY

## 2023-08-21 PROCEDURE — 250N000011 HC RX IP 250 OP 636: Performed by: FAMILY MEDICINE

## 2023-08-21 RX ORDER — PANTOPRAZOLE SODIUM 40 MG/1
40 TABLET, DELAYED RELEASE ORAL
Status: DISCONTINUED | OUTPATIENT
Start: 2023-08-22 | End: 2023-08-28 | Stop reason: HOSPADM

## 2023-08-21 RX ORDER — FLUORIDE TOOTHPASTE
100 TOOTHPASTE DENTAL 4 TIMES DAILY PRN
Status: DISCONTINUED | OUTPATIENT
Start: 2023-08-21 | End: 2023-08-21

## 2023-08-21 RX ORDER — FLUORIDE TOOTHPASTE
15 TOOTHPASTE DENTAL 4 TIMES DAILY PRN
Status: DISCONTINUED | OUTPATIENT
Start: 2023-08-21 | End: 2023-08-28 | Stop reason: HOSPADM

## 2023-08-21 RX ADMIN — QUETIAPINE FUMARATE 400 MG: 400 TABLET ORAL at 20:26

## 2023-08-21 RX ADMIN — LITHIUM CARBONATE 600 MG: 300 TABLET, EXTENDED RELEASE ORAL at 20:27

## 2023-08-21 RX ADMIN — ACETAMINOPHEN, ASPIRIN, CAFFEINE 1 TABLET: 250; 65; 250 TABLET, FILM COATED ORAL at 07:08

## 2023-08-21 RX ADMIN — GABAPENTIN 1200 MG: 600 TABLET, FILM COATED ORAL at 13:49

## 2023-08-21 RX ADMIN — TRIFLUOPERAZINE HYDROCHLORIDE 2 MG: 2 TABLET, FILM COATED ORAL at 20:26

## 2023-08-21 RX ADMIN — OMEPRAZOLE 20 MG: 20 CAPSULE, DELAYED RELEASE ORAL at 08:12

## 2023-08-21 RX ADMIN — NICOTINE POLACRILEX 4 MG: 4 GUM, CHEWING BUCCAL at 20:50

## 2023-08-21 RX ADMIN — AMLODIPINE BESYLATE 10 MG: 10 TABLET ORAL at 08:13

## 2023-08-21 RX ADMIN — INSULIN ASPART 2 UNITS: 100 INJECTION, SOLUTION INTRAVENOUS; SUBCUTANEOUS at 20:22

## 2023-08-21 RX ADMIN — BUSPIRONE HYDROCHLORIDE 15 MG: 15 TABLET ORAL at 08:13

## 2023-08-21 RX ADMIN — GABAPENTIN 1200 MG: 600 TABLET, FILM COATED ORAL at 08:13

## 2023-08-21 RX ADMIN — DOXYCYCLINE HYCLATE 100 MG: 50 CAPSULE ORAL at 20:27

## 2023-08-21 RX ADMIN — CLONAZEPAM 0.5 MG: 0.5 TABLET ORAL at 08:16

## 2023-08-21 RX ADMIN — NICOTINE POLACRILEX 4 MG: 4 GUM, CHEWING BUCCAL at 19:28

## 2023-08-21 RX ADMIN — BUSPIRONE HYDROCHLORIDE 15 MG: 15 TABLET ORAL at 13:49

## 2023-08-21 RX ADMIN — NICOTINE POLACRILEX 4 MG: 4 GUM, CHEWING BUCCAL at 08:16

## 2023-08-21 RX ADMIN — NICOTINE POLACRILEX 4 MG: 4 GUM, CHEWING BUCCAL at 13:49

## 2023-08-21 RX ADMIN — DOXYCYCLINE HYCLATE 100 MG: 50 CAPSULE ORAL at 08:13

## 2023-08-21 RX ADMIN — OLANZAPINE 10 MG: 5 TABLET, ORALLY DISINTEGRATING ORAL at 18:29

## 2023-08-21 RX ADMIN — NICOTINE 1 PATCH: 21 PATCH, EXTENDED RELEASE TRANSDERMAL at 08:13

## 2023-08-21 RX ADMIN — GABAPENTIN 1200 MG: 600 TABLET, FILM COATED ORAL at 20:27

## 2023-08-21 RX ADMIN — NICOTINE POLACRILEX 4 MG: 4 GUM, CHEWING BUCCAL at 15:44

## 2023-08-21 RX ADMIN — POLYETHYLENE GLYCOL 3350 17 G: 17 POWDER, FOR SOLUTION ORAL at 08:12

## 2023-08-21 RX ADMIN — ONDANSETRON 4 MG: 4 TABLET, ORALLY DISINTEGRATING ORAL at 08:27

## 2023-08-21 RX ADMIN — NICOTINE POLACRILEX 4 MG: 4 GUM, CHEWING BUCCAL at 06:54

## 2023-08-21 RX ADMIN — NICOTINE POLACRILEX 4 MG: 4 GUM, CHEWING BUCCAL at 12:07

## 2023-08-21 RX ADMIN — Medication 10 MG: at 20:26

## 2023-08-21 RX ADMIN — BUSPIRONE HYDROCHLORIDE 15 MG: 15 TABLET ORAL at 20:26

## 2023-08-21 RX ADMIN — TESTOSTERONE 20.25 MG: 20.25 GEL TOPICAL at 08:13

## 2023-08-21 RX ADMIN — TRIFLUOPERAZINE HYDROCHLORIDE 2 MG: 2 TABLET, FILM COATED ORAL at 08:13

## 2023-08-21 RX ADMIN — OLANZAPINE 10 MG: 5 TABLET, ORALLY DISINTEGRATING ORAL at 10:37

## 2023-08-21 RX ADMIN — NICOTINE POLACRILEX 4 MG: 4 GUM, CHEWING BUCCAL at 18:08

## 2023-08-21 ASSESSMENT — ACTIVITIES OF DAILY LIVING (ADL)
DRESS: SCRUBS (BEHAVIORAL HEALTH)
ADLS_ACUITY_SCORE: 42
ADLS_ACUITY_SCORE: 42
ORAL_HYGIENE: INDEPENDENT
HYGIENE/GROOMING: HANDWASHING;INDEPENDENT
ADLS_ACUITY_SCORE: 42

## 2023-08-21 NOTE — PLAN OF CARE
Problem: Suicide Risk  Goal: Absence of Self-Harm  Outcome: Progressing   Goal Outcome Evaluation:    Plan of Care Reviewed With: patient      Pt visible in milieu interacting with select peers and staff, intermittently isolative and withdrawn, still on SIO for suicidal thoughts. She brightens up on approach otherwise flat and blunted affect and calm mood. Pt cooperative with cares and med compliance. Pt alert and oriented, VSS on room air,  in am and 198 @ noon. Patient  endorsed anxiety 8/10, klonopin given and later Zyprexa given. Pt reported c/o auditory hallucinations on thoughts to harm self. Later pt said suicidal thoughts were getting stronger but denied plans to respond to voices. Pt denied rest of mental health symptoms, reported headaches/migraines, tylenol given with moderate effect. Pt utilized nicitine gum, c/o nausea, Zofran administered. Pt c/o facial numbness and tingling and lightheadedness, provider notified and saw patient. When patient assessed for medication side effects, pt reported dry mouth, provider notified and biotine ordered. Provider ordered Lithium levels for 8/22/23. Pt attended group  Denied blood in BM. Pt may use markers per the orders

## 2023-08-21 NOTE — PROGRESS NOTES
Progress Notes      Signed     Date of Service: 8/21/2023  3:05 PM  Creation Time: 8/21/2023  3:05 PM            08/21/23 1500   Group Therapy Session   Time Session Began 1115   Time Session Ended 1500   Total Time (minutes) 60   Total # Attendees 3   Group Type expressive therapy   Group Topic Covered balanced lifestyle;emotions/expression;coping skills/lifestyle management;relaxation techniques;self-care activities;structured socialization   Group Session Detail Relaxation/Life Review/Instrument Clinic   Patient Response/Contribution cooperative with task   Patient Participation Detail Cooperatively engaged in Morning Music Relaxation group to decrease anxiety.   Also participated in 20 minutes of afternoon session, but was in and out multiple times, with 1:1 staff present.   Pt likes to check in about when Rehab staff will be back, who is working when.  Pays attention in detail to the schedule, which MT complimented pt for.   Did participate for the majority of morning session, sharing songs pt found relaxing/grounding/meaningful.  Did not expound verbally on processing these at this time.  Plan to offer MT group again tomorrow at the evening (4:15) time slot.  Pt appears to value independence and having choice within sessions, while also thriving on structure.

## 2023-08-21 NOTE — PLAN OF CARE
Pt asleep at start of shift.     Breathing quiet and unlabored when sleeping.     Pt had no c/o pain or discomfort during the HS.     Appears to have slept 6.25 hours.     Pt continues on 1:1 SIO/5 ft space distance.     Refused 0200 BG check.     Patient requested and was given an EXCEDRIN MIGRAINE 650 mg @ 0708 for c/o a headache.     Goal Outcome Evaluation:  Problem: Adult Behavioral Health Plan of Care  Goal: Adheres to Safety Considerations for Self and Others  Intervention: Develop and Maintain Individualized Safety Plan  Recent Flowsheet Documentation  Taken 8/21/2023 0000 by Nancy Dietz, RN  Safety Measures:   safety rounds completed   1:1 observation maintained  Goal: Absence of New-Onset Illness or Injury  Intervention: Identify and Manage Fall Risk  Recent Flowsheet Documentation  Taken 8/21/2023 0000 by Nancy Dietz, RN  Safety Measures:   safety rounds completed   1:1 observation maintained     Problem: Sleep Disturbance  Goal: Adequate Sleep/Rest  Outcome: Progressing

## 2023-08-21 NOTE — PLAN OF CARE
Assessment/Intervention/Current Symtoms and Care Coordination:  Chart review and met with team, discussed pt progress, symptomology, and response to treatment.  Discussed the discharge plan and any potential impediments to discharge. Patient started on Lithium. Patient is being encouraged to trial other treatment medications before ECT is consulted.      -Writer received call from [facility] gunderson/[facility] requesting updated insurance information. Writer provided info and per [facility] pt's insurance is out of network for all their programs.     Writer called FLORIDA Muniz @[phone] writer left msg regarding status of pt returning to group home and an update on referrals for outpatient services. Ph call from Cristy stating she has spoken w/[facility] for [facility] Housing and pt will discharge to his current housing and they will schedule a meet and greet to the location in [facility].       Discharge Plan or Goal  Back to group home     Barriers to Discharge:  Symptom and medication stabilization     Referral Status:  DBT-PTSD  [facility] has openings starting 9/2023  [facility]      Legal Status:  Committed   County: [facility]   File Number: [id number]   Start and expiration date of commitment: 8/14/23-2/14/24     Contacts:  : Cristy at [facility], [phone](VAL signed)   [facility]: Deena at [employer], [phone] (VAL signed)   [facility] Group Home Director and Nurse: Domenica [phone] (VAL signed)   CADI worker: Pretty Guzman at [employer], [phone] (VAL signed)   Psychotherapist: Joshua at [employer], [phone] (VAL signed)       Upcoming Meetings and Dates/Important Information and next steps:

## 2023-08-21 NOTE — PROGRESS NOTES
[facility], [place]   Psychiatric Progress Note  Hospital Day: 8        Interim History:   The patient's care was discussed with the treatment team during the daily team meeting and/or staff's chart notes were reviewed.  Patient slept most of the night, last night endorsed SI after SIO was discontinued, denied SI this AM.     Upon interview, Prakash stated that suicidal thoughts are still present and that he does not believe that the hospitalization is helping very much so far or the medication that was started last week.  He said that he was feeling numbness and tingling in his cheeks and related this to anxiety preceding her check and conversation.  He states he does not feel safe if he were to discharge.  He could not recall effective coping mechanisms at this time for negative emotions besides using his markers.    Suicidal ideation: as above    Homicidal ideation: denies current or recent homicidal ideation or behaviors.    Psychotic symptoms: Patient reports command AH telling him to kill himself, Denies VH, paranoia.      Medication side effects reported: Some dry mouth, otherwise no significant side effects.    Acute medical concerns: none    Other issues reported by patient: Patient had no further questions or concerns.           Medications:      amLODIPine  10 mg Oral Daily    busPIRone  15 mg Oral TID    doxycycline hyclate  100 mg Oral BID    gabapentin  1,200 mg Oral TID    insulin aspart  1-3 Units Subcutaneous TID AC    insulin aspart  1-3 Units Subcutaneous At Bedtime    insulin glargine  20 Units Subcutaneous QAM AC    lithium ER  600 mg Oral At Bedtime    melatonin  10 mg Oral QPM    nicotine  1 patch Transdermal Daily    nicotine   Transdermal Q8H    omeprazole  20 mg Oral Daily    polyethylene glycol  17 g Oral Daily    QUEtiapine  400 mg Oral At Bedtime    testosterone  20.25 mg Transdermal Daily    trifluoperazine  2 mg Oral BID          Allergies:     Allergies  "  Allergen Reactions    Haldol [Haloperidol] Other (See Comments)     Makes patient very angry and anxious    Adhesive Tape Hives    Percocet [Oxycodone-Acetaminophen] Nausea and Vomiting    Prednisone Other (See Comments) and Hives     Suicidal ideation    Risperidone Other (See Comments)    Tramadol Hcl Nausea and Vomiting    Droperidol Anxiety    Seroquel [Quetiapine] Palpitations     Spent 2 weeks in the hospital due to having seroquel, caused palpitations and QT prolongation          Labs:     Recent Results (from the past 24 hour(s))   Glucose by meter    Collection Time: 08/20/23 11:36 AM   Result Value Ref Range    GLUCOSE BY METER POCT 210 (H) 70 - 99 mg/dL   Glucose by meter    Collection Time: 08/20/23  5:05 PM   Result Value Ref Range    GLUCOSE BY METER POCT 141 (H) 70 - 99 mg/dL   Glucose by meter    Collection Time: 08/20/23 10:01 PM   Result Value Ref Range    GLUCOSE BY METER POCT 230 (H) 70 - 99 mg/dL   Glucose by meter    Collection Time: 08/21/23  7:59 AM   Result Value Ref Range    GLUCOSE BY METER POCT 236 (H) 70 - 99 mg/dL          Psychiatric Examination:     /86 (BP Location: Right arm, Patient Position: Sitting, Cuff Size: Adult Large)   Pulse 98   Temp 97.2  F (36.2  C) (Temporal)   Resp 16   Ht 1.676 m (5' 6\")   Wt 105.8 kg (233 lb 4.8 oz)   SpO2 94%   BMI 37.66 kg/m    Weight is 233 lbs 4.8 oz  Body mass index is 37.66 kg/m .    Weight over time:  Vitals:    08/13/23 1812   Weight: 105.8 kg (233 lb 4.8 oz)       Orthostatic Vitals         Most Recent      Sitting Orthostatic /89 08/14 0924    Sitting Orthostatic Pulse (bpm) 94 08/14 0924    Standing Orthostatic /92 08/13 1600              Cardiometabolic risk assessment. 08/14/23      Reviewed patient profile for cardiometabolic risk factors    Date taken /Value  REFERENCE RANGE   Abdominal Obesity  (Waist Circumference)   See nursing flowsheet Women ?35 in (88 cm)   Men ?40 in (102 cm)      Triglycerides  " Triglycerides   Date Value Ref Range Status   05/28/2023 240 (H) <150 mg/dL Final   04/28/2021 317 (H) <150 mg/dL Final       ?150 mg/dL (1.7 mmol/L) or current treatment for elevated triglycerides   HDL cholesterol  HDL Cholesterol   Date Value Ref Range Status   04/28/2021 37 (L) >40 mg/dL Final     Direct Measure HDL   Date Value Ref Range Status   05/28/2023 35 (L) >=40 mg/dL Final   ]   Women <50 mg/dL (1.3 mmol/L) in women or current treatment for low HDL cholesterol  Men <40 mg/dL (1 mmol/L) in men or current treatment for low HDL cholesterol     Fasting plasma glucose (FPG) Lab Results   Component Value Date     08/14/2023     05/05/2023     05/05/2023     05/19/2021      FPG ?100 mg/dL (5.6 mmol/L) or treatment for elevated blood glucose   Blood pressure  BP Readings from Last 3 Encounters:   08/21/23 131/86   07/21/23 126/87   06/09/23 133/89    Blood pressure ?130/85 mmHg or treatment for elevated blood pressure   Family History  See family history     Mental Status Exam:  Appearance: awake, alert  Attitude:  cooperative  Eye Contact:  good  Mood:  depressed  Affect:  mood congruent, blunted  Speech:  clear, coherent  Language: fluent and intact in English  Psychomotor, Gait, Musculoskeletal:  no evidence of tardive dyskinesia, dystonia, or tics  Throught Process:  linear, disorganized and illogical  Associations:  no loose associations  Thought Content:  no evidence of suicidal ideation or homicidal ideation, auditory hallucinations present and no visual hallucinations present  Insight:  fair  Judgement:  fair  Oriented to:  time, person, and place  Attention Span and Concentration:  fair  Recent and Remote Memory:  fair  Fund of Knowledge:  appropriate           Precautions:     Behavioral Orders   Procedures    Code 1 - Restrict to Unit    Routine Programming     As clinically indicated    Self Injury Precaution    Status 15     Every 15 minutes.    Status Individual  Observation     Patient SIO status reviewed with team/RN.  Please also refer to RN/team documentation for add'l detail.    -SIO staff to monitor following which have contributed to patient being on SIO:  Active si with intent  -Possible interventions SIO staff could use to support patient's treatment progress:  Close monitoring   -When following observed, team will review discontinuation of SIO:  Absence of active SI and intent for 24 hours     Order Specific Question:   CONTINUOUS 24 hours / day     Answer:   5 feet     Order Specific Question:   Indications for SIO     Answer:   Suicide risk    Suicide precautions     Patients on Suicide Precautions should have a Combination Diet ordered that includes a Diet selection(s) AND a Behavioral Tray selection for Safe Tray - with utensils, or Safe Tray - NO utensils            Diagnoses:     Schizoaffective disorder, chronic condition with acute exacerbation (H)  PTSD (post-traumatic stress disorder)  Borderline personality disorder (H)  Suicidal ideation         Assessment & Plan:     Assessment and hospital summary:  Patient is a 33 year old with past history of schizoaffective disorder, borderline personality disorder and methamphetamine dependence in remission for 7 years who reports increased command auditory hallucinations of God telling him to kill himself.  He has also had 5 nights without sleep and been taking risks and been arrested twice in the last 2 weeks.  He most likely is in a mixed state episode with psychosis.  Dr. Torres stopped Adderall and increased Seroquel to 300mg at bedtime. Since then, we have optimized Seroquel. Pt has required SIO monitoring due to persistent active SI with intent. Buspar was increased to target anxiety and lithium was initiated upon admission.  Patient was initially requesting ECT, and this is being considered pending treatment response to lithium.      Today's Changes:  Add biotene saline mouthwash for dry mouth  Order  lithium level for tomorrow morning  Schedule Melatonin in evening     Target psychiatric symptoms and interventions:  continue Seroquel to 400 mg at bedtime   continue Lithium 600 mg at bedtime  Continue melatonin for initial insomnia  HOLDING Adderall  Buspar 15 mg TID  Gabapentin 1200 mg TID  Nicotine replacement  Risks, benefits, and alternatives discussed at length with patient.     Acute Medical Problems and Treatments:  Acute medical concerns:  Left shoulder pain:  Per IM note dated 8/16:   Brief medicine note     Medicine service following up for patient's troponin, which was checked for some left shoulder pain radiating to his chest.  Troponin returned at <6.  No plans for further monitoring at this time.  As pain is reproducible, suspect musculoskeletal pain.  Continue treatment with as needed ibuprofen, which has provided patient relief.  Please refer to consultation note on 8/15 for further details.        Medicine service to sign off at this time.  Please contact or consult us with any new medical questions or concerns.     Yolie Rodriguez PA-C    Type 2 diabetes mellitus  Patient is prescribed Lantus 15 units every morning and Ozempic weekly. He has not been compliant with this regimen for months. He states he believes that insulin is poison, that he was told this by someone. Most recent A1c in May was 9.1%. He has been refusing Lantus here. After some education and discussion, patient was willing to try taking the Lantus here.   - continue PTA Lantus 15 units q am  - hold Ozempic here  - hypoglycemia protocol  - glucose monitoring POC PRN  - check A1c  - recommend followup with PCP at discharge for continued management     Pertinent labs/imaging:  Reviewed    Behavioral/Psychological/Social:  - Encourage unit programming    Safety:  - Continue precautions as noted above  - Status 15 minute checks  - SIO in place for active SI with intent    Legal Status: Patient is committed    Disposition Plan   Reason  for ongoing admission: poses an imminent risk to self  Discharge location: group home  Discharge Medications: not ordered  Follow-up Appointments: not scheduled    Entered by: David Vegas DO on 8/17/2023 at 10:58 AM

## 2023-08-22 LAB
GLUCOSE BLDC GLUCOMTR-MCNC: 220 MG/DL (ref 70–99)
GLUCOSE BLDC GLUCOMTR-MCNC: 230 MG/DL (ref 70–99)
GLUCOSE BLDC GLUCOMTR-MCNC: 239 MG/DL (ref 70–99)
GLUCOSE BLDC GLUCOMTR-MCNC: 277 MG/DL (ref 70–99)
LITHIUM SERPL-SCNC: 0.5 MMOL/L (ref 0.6–1.2)

## 2023-08-22 PROCEDURE — 250N000013 HC RX MED GY IP 250 OP 250 PS 637: Performed by: PSYCHIATRY & NEUROLOGY

## 2023-08-22 PROCEDURE — G0177 OPPS/PHP; TRAIN & EDUC SERV: HCPCS

## 2023-08-22 PROCEDURE — 250N000013 HC RX MED GY IP 250 OP 250 PS 637: Performed by: STUDENT IN AN ORGANIZED HEALTH CARE EDUCATION/TRAINING PROGRAM

## 2023-08-22 PROCEDURE — 124N000002 HC R&B MH UMMC

## 2023-08-22 PROCEDURE — 250N000013 HC RX MED GY IP 250 OP 250 PS 637: Performed by: PHYSICIAN ASSISTANT

## 2023-08-22 PROCEDURE — 80178 ASSAY OF LITHIUM: CPT | Performed by: STUDENT IN AN ORGANIZED HEALTH CARE EDUCATION/TRAINING PROGRAM

## 2023-08-22 PROCEDURE — 250N000011 HC RX IP 250 OP 636: Performed by: FAMILY MEDICINE

## 2023-08-22 PROCEDURE — 36415 COLL VENOUS BLD VENIPUNCTURE: CPT | Performed by: STUDENT IN AN ORGANIZED HEALTH CARE EDUCATION/TRAINING PROGRAM

## 2023-08-22 PROCEDURE — 250N000013 HC RX MED GY IP 250 OP 250 PS 637: Performed by: EMERGENCY MEDICINE

## 2023-08-22 PROCEDURE — 99207 PR NO BILLABLE SERVICE THIS VISIT: CPT

## 2023-08-22 PROCEDURE — 250N000013 HC RX MED GY IP 250 OP 250 PS 637: Performed by: FAMILY MEDICINE

## 2023-08-22 RX ADMIN — TRIFLUOPERAZINE HYDROCHLORIDE 2 MG: 2 TABLET, FILM COATED ORAL at 20:12

## 2023-08-22 RX ADMIN — POLYETHYLENE GLYCOL 3350 17 G: 17 POWDER, FOR SOLUTION ORAL at 08:17

## 2023-08-22 RX ADMIN — GABAPENTIN 1200 MG: 600 TABLET, FILM COATED ORAL at 08:15

## 2023-08-22 RX ADMIN — NICOTINE POLACRILEX 4 MG: 4 GUM, CHEWING BUCCAL at 09:57

## 2023-08-22 RX ADMIN — LITHIUM CARBONATE 600 MG: 300 TABLET, EXTENDED RELEASE ORAL at 20:11

## 2023-08-22 RX ADMIN — GABAPENTIN 1200 MG: 600 TABLET, FILM COATED ORAL at 20:11

## 2023-08-22 RX ADMIN — OLANZAPINE 10 MG: 5 TABLET, ORALLY DISINTEGRATING ORAL at 13:52

## 2023-08-22 RX ADMIN — PANTOPRAZOLE SODIUM 40 MG: 40 TABLET, DELAYED RELEASE ORAL at 08:15

## 2023-08-22 RX ADMIN — NICOTINE POLACRILEX 4 MG: 4 GUM, CHEWING BUCCAL at 08:47

## 2023-08-22 RX ADMIN — OLANZAPINE 10 MG: 5 TABLET, ORALLY DISINTEGRATING ORAL at 18:13

## 2023-08-22 RX ADMIN — NICOTINE POLACRILEX 4 MG: 4 GUM, CHEWING BUCCAL at 21:03

## 2023-08-22 RX ADMIN — TESTOSTERONE 20.25 MG: 20.25 GEL TOPICAL at 08:15

## 2023-08-22 RX ADMIN — CLONAZEPAM 0.5 MG: 0.5 TABLET ORAL at 10:26

## 2023-08-22 RX ADMIN — TRIFLUOPERAZINE HYDROCHLORIDE 2 MG: 2 TABLET, FILM COATED ORAL at 08:15

## 2023-08-22 RX ADMIN — NICOTINE 1 PATCH: 21 PATCH, EXTENDED RELEASE TRANSDERMAL at 08:16

## 2023-08-22 RX ADMIN — BUSPIRONE HYDROCHLORIDE 15 MG: 15 TABLET ORAL at 08:15

## 2023-08-22 RX ADMIN — BUSPIRONE HYDROCHLORIDE 15 MG: 15 TABLET ORAL at 13:52

## 2023-08-22 RX ADMIN — NICOTINE POLACRILEX 4 MG: 4 GUM, CHEWING BUCCAL at 07:48

## 2023-08-22 RX ADMIN — DOXYCYCLINE HYCLATE 100 MG: 50 CAPSULE ORAL at 20:11

## 2023-08-22 RX ADMIN — GABAPENTIN 1200 MG: 600 TABLET, FILM COATED ORAL at 13:52

## 2023-08-22 RX ADMIN — ONDANSETRON 4 MG: 4 TABLET, ORALLY DISINTEGRATING ORAL at 08:45

## 2023-08-22 RX ADMIN — ACETAMINOPHEN, ASPIRIN, CAFFEINE 1 TABLET: 250; 65; 250 TABLET, FILM COATED ORAL at 07:48

## 2023-08-22 RX ADMIN — AMLODIPINE BESYLATE 10 MG: 10 TABLET ORAL at 08:15

## 2023-08-22 RX ADMIN — NICOTINE POLACRILEX 4 MG: 4 GUM, CHEWING BUCCAL at 13:52

## 2023-08-22 RX ADMIN — DOXYCYCLINE HYCLATE 100 MG: 50 CAPSULE ORAL at 08:15

## 2023-08-22 RX ADMIN — QUETIAPINE FUMARATE 400 MG: 400 TABLET ORAL at 20:12

## 2023-08-22 RX ADMIN — BUSPIRONE HYDROCHLORIDE 15 MG: 15 TABLET ORAL at 20:11

## 2023-08-22 RX ADMIN — HYDROXYZINE HYDROCHLORIDE 50 MG: 50 TABLET, FILM COATED ORAL at 18:58

## 2023-08-22 RX ADMIN — NICOTINE POLACRILEX 4 MG: 4 GUM, CHEWING BUCCAL at 16:24

## 2023-08-22 RX ADMIN — Medication 10 MG: at 20:11

## 2023-08-22 RX ADMIN — NICOTINE POLACRILEX 4 MG: 4 GUM, CHEWING BUCCAL at 18:55

## 2023-08-22 RX ADMIN — NICOTINE POLACRILEX 4 MG: 4 GUM, CHEWING BUCCAL at 12:37

## 2023-08-22 ASSESSMENT — ACTIVITIES OF DAILY LIVING (ADL)
ORAL_HYGIENE: INDEPENDENT
ADLS_ACUITY_SCORE: 42
HYGIENE/GROOMING: INDEPENDENT
ADLS_ACUITY_SCORE: 42
ADLS_ACUITY_SCORE: 42
DRESS: INDEPENDENT
ADLS_ACUITY_SCORE: 42
LAUNDRY: UNABLE TO COMPLETE
ADLS_ACUITY_SCORE: 42
ADLS_ACUITY_SCORE: 42

## 2023-08-22 NOTE — PLAN OF CARE
Problem: Suicide Risk  Goal: Absence of Self-Harm  Outcome: Progressing   Goal Outcome Evaluation:       Pt was in the milieu at the beginning of the shift.  with 1 unit on sliding scale. C/O being nauseas, and migraine 10/10.  Zofran  and Excedrin given with relief. Endorsed pain 10/10, anxiety 6/10, depression 4/10, and loud voices telling him to kill himself. Pt declined any further intervention from the PRN and scheduled medication given. Pain reassessment 3/10.Denied SI/HI/SIB and contracted for safety.    Pt calm and cooperative. He spent most of the shift at the [employer] coloring and listen to music. He says that keeps him busy, not concentrating on the voices. About 1100 he requested for Clonazepam for anxiety with relief. 1145  with 2 unit sliding scale.    He asking for nicotine gum about hourly. Attended some group. Ate adequately both breakfast and lunch. 1410 pt requested for Zyprexa. Will continue to monitor    PRN  Nicotine gum  Zofran  Excedrin  Clonazepam  Zyprexa 10 mg

## 2023-08-22 NOTE — PROGRESS NOTES
08/22/23 1624   Group Therapy Session   Group Attendance attended group session   Time Session Began 1315   Time Session Ended 1400   Total Time (minutes) 15   Total # Attendees 3   Group Type recreation   Group Session Detail Education and group discussion provided on the importance of healthy leisure participation and the challenge of maintaining healthy leisure activities when struggling with mental illness and/or chemical dependency.  Hands on Scattergories category activity for concentration, cognitive processing, flexible thinking, creativity, tracking, mood stabilization, reality-based activity, healthy leisure, frustration tolerance, follow through, socialization and an opportunity to experience success   Patient Participation Detail Pt joined group, though declined to work on the planned activity.  Instead pt opted to work on a coloring project and listen to music.  Pt only remained for about 15 min prior to leaving group.  Pt was social upon approach, though a bit restless.  At times pt would also offer verbal answers to the Scattergories activity when peers were sharing their responses.  No charge.

## 2023-08-22 NOTE — PROGRESS NOTES
Brief Medicine  Note:    Medicine is following for T2DM and headaches. Pt was not seen, but relevant data was reviewed below.     Type 2 diabetes mellitus  See trends below. Patient is prescribed Lantus 15 units every morning and Ozempic weekly at baseline. He has not been compliant with this regimen for months. He states he believes that insulin is poison, that he was told this by someone. Most recent A1c in May was 9.1%.  A1c checked and is 8.8% here.  Patient was initially refusing Lantus here. After some education and discussion, patient was willing to try taking the Lantus here.   - Increase Lantus from 17 units to 20 units daily  - Increase low sliding scale insulin to medium sliding on 6/22  - Plan to resume Ozempic after discharge  - hypoglycemia protocol  - glucose monitoring POC PRN  - recommend followup with PCP at discharge for continued management    Recent Labs   Lab 08/22/23  1144 08/22/23  0755 08/21/23 2014 08/21/23  1751 08/21/23  1205 08/21/23  0759   * 230* 308* 282* 198* 236*       Radha Barclay, CNP, APRN  Internal Medicine OFELIA Hospitalist  [facility]  [phone]  Securely message with the Vocera Web Console   Text page via [employer]   Text page via [employer]

## 2023-08-22 NOTE — PLAN OF CARE
Problem: Sleep Disturbance  Goal: Adequate Sleep/Rest  Outcome: Progressing   Goal Outcome Evaluation:    Patient appeared to sleep 7 hours this night shift.  No prns or snacks given or requested.  No concerns were reported or noted.  Declined BG at 0200 due to being asleep.  HS BG was 308.

## 2023-08-22 NOTE — PROGRESS NOTES
08/22/23 1557   Group Therapy Session   Group Attendance attended group session   Time Session Began 1025   Time Session Ended 1110   Total Time (minutes) 45   Total # Attendees 1   Group Type life skill   Group Session Detail Stretching and Social Questions for large motor movement, grounding, direction following, coping, reality-based activity, mood stabilization, encouraging daily self-care, building self-esteem, sharing thoughts/feelings, and expanding social skills.   Patient Participation Detail Pt was pleasant, approachable and invested in group.  Pt was particularly interested in the physical stretches and shared about the spinal injury they had sustained and recovered from in the past.  Pt admitted that they are still having suicidal thoughts, which have been persistant.  Pt responded positively to empathetic listening and voiced appreciating time spent with them.

## 2023-08-22 NOTE — PLAN OF CARE
Assessment/Intervention/Current Symtoms and Care Coordination:  Chart review and met with team, discussed pt progress, symptomology, and response to treatment.  Discussed the discharge plan and any potential impediments to discharge. Lithium being titrated to reach therapeutic level. Patient is being encouraged to trial other treatment medications before ECT is consulted.      -Writer met with pt and provided information from CM that he would discharge back to his group home and they would schedule a meet and greet with ICS provider with his own apartment. Pt expressed excitement and is looking forward to that. Writer informed him his insurance is out of network with [facility]'s IOP and PHP programming. Writer advised pt would look to 's and [facility] program wait times.         Discharge Plan or Goal  Back to group home     Barriers to Discharge:  Symptom and medication stabilization     Referral Status:  DBT-PTSD  [facility] has openings starting 9/2023  [facility]      Legal Status:  Committed   County: [facility]   File Number: [id number]   Start and expiration date of commitment: 8/14/23-2/14/24     Contacts:  : Cristy at [facility], [phone](VAL signed)   [facility]: Deena at [employer], [phone] (VAL signed)   [facility] Group Home Director and Nurse: Domenica [phone] (VAL signed)   CADI worker: Pretty Guzman at [employer], [phone] (VAL signed)   Psychotherapist: Joshua at [employer], [phone] (VAL signed)       Upcoming Meetings and Dates/Important Information and next steps:

## 2023-08-22 NOTE — PLAN OF CARE
Problem: Suicidal Behavior  Goal: Suicidal Behavior is Absent or Managed  Outcome: Not Progressing   Goal Outcome Evaluation:    Plan of Care Reviewed With: patient        Patient continues to have thoughts of wanting to harm himself, endorses depression and anxiety. He denies having any physical pain. Patient was given PRN Zyprexa due  to increased auditory hallucinations of hurting him self. Patient requested to use markers and was able to use it while SIO staff is monitoring patient. Patient stated he is been planning on harming himself with staples but did not act on it and was willing to tell writer his thoughts. Patient's blood sugar prior to supper was 282 and was given 2 units of Insulin sliding scale, and before bed time it was blood sugar was 308 and was given 2 units of Insulin siding scale. He was out on unit socializing with peers and staff. He played card games with writer and SIO staff.     Vitals were stable this shift, and no behavioral outburst during this shift.

## 2023-08-23 ENCOUNTER — APPOINTMENT (OUTPATIENT)
Dept: GENERAL RADIOLOGY | Facility: CLINIC | Age: 33
DRG: 885 | End: 2023-08-23
Attending: PSYCHIATRY & NEUROLOGY
Payer: MEDICARE

## 2023-08-23 LAB
GLUCOSE BLDC GLUCOMTR-MCNC: 187 MG/DL (ref 70–99)
GLUCOSE BLDC GLUCOMTR-MCNC: 230 MG/DL (ref 70–99)
GLUCOSE BLDC GLUCOMTR-MCNC: 235 MG/DL (ref 70–99)
GLUCOSE BLDC GLUCOMTR-MCNC: 310 MG/DL (ref 70–99)

## 2023-08-23 PROCEDURE — 250N000013 HC RX MED GY IP 250 OP 250 PS 637: Performed by: STUDENT IN AN ORGANIZED HEALTH CARE EDUCATION/TRAINING PROGRAM

## 2023-08-23 PROCEDURE — 250N000013 HC RX MED GY IP 250 OP 250 PS 637: Performed by: PSYCHIATRY & NEUROLOGY

## 2023-08-23 PROCEDURE — H2032 ACTIVITY THERAPY, PER 15 MIN: HCPCS

## 2023-08-23 PROCEDURE — 250N000013 HC RX MED GY IP 250 OP 250 PS 637: Performed by: FAMILY MEDICINE

## 2023-08-23 PROCEDURE — 99232 SBSQ HOSP IP/OBS MODERATE 35: CPT | Performed by: PSYCHIATRY & NEUROLOGY

## 2023-08-23 PROCEDURE — G0177 OPPS/PHP; TRAIN & EDUC SERV: HCPCS

## 2023-08-23 PROCEDURE — 73120 X-RAY EXAM OF HAND: CPT | Mod: RT

## 2023-08-23 PROCEDURE — 124N000002 HC R&B MH UMMC

## 2023-08-23 PROCEDURE — 250N000013 HC RX MED GY IP 250 OP 250 PS 637: Performed by: PHYSICIAN ASSISTANT

## 2023-08-23 RX ORDER — LITHIUM CARBONATE 450 MG
900 TABLET, EXTENDED RELEASE ORAL AT BEDTIME
Status: DISCONTINUED | OUTPATIENT
Start: 2023-08-23 | End: 2023-08-28 | Stop reason: HOSPADM

## 2023-08-23 RX ADMIN — NICOTINE POLACRILEX 4 MG: 4 GUM, CHEWING BUCCAL at 08:07

## 2023-08-23 RX ADMIN — NICOTINE POLACRILEX 4 MG: 4 GUM, CHEWING BUCCAL at 09:14

## 2023-08-23 RX ADMIN — Medication 15 ML: at 16:05

## 2023-08-23 RX ADMIN — OLANZAPINE 10 MG: 5 TABLET, ORALLY DISINTEGRATING ORAL at 14:16

## 2023-08-23 RX ADMIN — GABAPENTIN 1200 MG: 600 TABLET, FILM COATED ORAL at 08:45

## 2023-08-23 RX ADMIN — ACETAMINOPHEN 650 MG: 325 TABLET, FILM COATED ORAL at 08:07

## 2023-08-23 RX ADMIN — TRIFLUOPERAZINE HYDROCHLORIDE 2 MG: 2 TABLET, FILM COATED ORAL at 08:45

## 2023-08-23 RX ADMIN — NICOTINE 1 PATCH: 21 PATCH, EXTENDED RELEASE TRANSDERMAL at 08:08

## 2023-08-23 RX ADMIN — BUSPIRONE HYDROCHLORIDE 15 MG: 15 TABLET ORAL at 19:31

## 2023-08-23 RX ADMIN — OLANZAPINE 10 MG: 5 TABLET, ORALLY DISINTEGRATING ORAL at 20:28

## 2023-08-23 RX ADMIN — DOXYCYCLINE HYCLATE 100 MG: 50 CAPSULE ORAL at 08:44

## 2023-08-23 RX ADMIN — PANTOPRAZOLE SODIUM 40 MG: 40 TABLET, DELAYED RELEASE ORAL at 07:51

## 2023-08-23 RX ADMIN — TESTOSTERONE 20.25 MG: 20.25 GEL TOPICAL at 08:44

## 2023-08-23 RX ADMIN — ACETAMINOPHEN, ASPIRIN, CAFFEINE 1 TABLET: 250; 65; 250 TABLET, FILM COATED ORAL at 05:23

## 2023-08-23 RX ADMIN — AMLODIPINE BESYLATE 10 MG: 10 TABLET ORAL at 08:45

## 2023-08-23 RX ADMIN — Medication 10 MG: at 19:31

## 2023-08-23 RX ADMIN — QUETIAPINE FUMARATE 400 MG: 400 TABLET ORAL at 19:32

## 2023-08-23 RX ADMIN — NICOTINE POLACRILEX 4 MG: 4 GUM, CHEWING BUCCAL at 16:04

## 2023-08-23 RX ADMIN — NICOTINE POLACRILEX 4 MG: 4 GUM, CHEWING BUCCAL at 19:37

## 2023-08-23 RX ADMIN — CLONAZEPAM 0.5 MG: 0.5 TABLET ORAL at 08:07

## 2023-08-23 RX ADMIN — POLYETHYLENE GLYCOL 3350 17 G: 17 POWDER, FOR SOLUTION ORAL at 08:44

## 2023-08-23 RX ADMIN — NICOTINE POLACRILEX 4 MG: 4 GUM, CHEWING BUCCAL at 13:34

## 2023-08-23 RX ADMIN — Medication 15 ML: at 09:22

## 2023-08-23 RX ADMIN — NICOTINE POLACRILEX 4 MG: 4 GUM, CHEWING BUCCAL at 18:32

## 2023-08-23 RX ADMIN — NICOTINE POLACRILEX 4 MG: 4 GUM, CHEWING BUCCAL at 11:10

## 2023-08-23 RX ADMIN — GABAPENTIN 1200 MG: 600 TABLET, FILM COATED ORAL at 19:31

## 2023-08-23 RX ADMIN — ACETAMINOPHEN 650 MG: 325 TABLET, FILM COATED ORAL at 16:05

## 2023-08-23 RX ADMIN — TRIFLUOPERAZINE HYDROCHLORIDE 2 MG: 2 TABLET, FILM COATED ORAL at 19:31

## 2023-08-23 RX ADMIN — DOXYCYCLINE HYCLATE 100 MG: 50 CAPSULE ORAL at 19:31

## 2023-08-23 RX ADMIN — LITHIUM CARBONATE 900 MG: 450 TABLET, EXTENDED RELEASE ORAL at 19:30

## 2023-08-23 RX ADMIN — NICOTINE POLACRILEX 4 MG: 4 GUM, CHEWING BUCCAL at 20:55

## 2023-08-23 RX ADMIN — NICOTINE POLACRILEX 4 MG: 4 GUM, CHEWING BUCCAL at 22:08

## 2023-08-23 RX ADMIN — BUSPIRONE HYDROCHLORIDE 15 MG: 15 TABLET ORAL at 08:45

## 2023-08-23 RX ADMIN — BUSPIRONE HYDROCHLORIDE 15 MG: 15 TABLET ORAL at 13:34

## 2023-08-23 RX ADMIN — NICOTINE POLACRILEX 4 MG: 4 GUM, CHEWING BUCCAL at 12:35

## 2023-08-23 RX ADMIN — GABAPENTIN 1200 MG: 600 TABLET, FILM COATED ORAL at 13:33

## 2023-08-23 RX ADMIN — NICOTINE POLACRILEX 4 MG: 4 GUM, CHEWING BUCCAL at 06:58

## 2023-08-23 ASSESSMENT — ACTIVITIES OF DAILY LIVING (ADL)
ORAL_HYGIENE: INDEPENDENT
LAUNDRY: UNABLE TO COMPLETE
ADLS_ACUITY_SCORE: 42
LAUNDRY: UNABLE TO COMPLETE
ADLS_ACUITY_SCORE: 42
ADLS_ACUITY_SCORE: 42
DRESS: INDEPENDENT
ADLS_ACUITY_SCORE: 42
ADLS_ACUITY_SCORE: 42
HYGIENE/GROOMING: INDEPENDENT
ORAL_HYGIENE: WITH SUPERVISION
ADLS_ACUITY_SCORE: 42
DRESS: INDEPENDENT
ADLS_ACUITY_SCORE: 42
HYGIENE/GROOMING: INDEPENDENT

## 2023-08-23 NOTE — CARE PLAN
08/23/23 1416   Group Therapy Session   Group Attendance attended group session   Time Session Began 1030   Time Session Ended 1115   Total Time (minutes) 5 (No Charge)   Total # Attendees 3   Group Type Occupational Therapy   Group Topic Covered balanced lifestyle;coping skills/lifestyle management;leisure exploration/use of leisure time;relaxation techniques   Group Session Detail OT Clinic Group   Patient Participation Detail Intervention: OT Clinic with 2 peers. Pt participated in a OT Clinic group to facilitate coping skills exploration and creative expression through personally meaningful activities, and to encourage utilization of these healthy coping skills to promote overall health and wellness. Group included clinical observation of social, cognitive and kinesthetic performance skills to inform treatment and safe discharge planning.    Patient Response: Briefly joined clinic group to continue working on a project started in a previous group. Shared with writer their plans for the project, however ultimately decided not to work on this, stating that they do not have the attention span to focus on a project at this time and left the group.     Cognition: Distractible        Mood/Affect:  Flat, Pleasant       Plan: Patient encouraged to maintain attendance for continued ongoing support in working towards occupational therapy goals to support overall treatment/care.

## 2023-08-23 NOTE — PLAN OF CARE
"Goal Outcome Evaluation:    Problem: Suicide Risk  Goal: Absence of Self-Harm  Outcome: Progressing     Patient has been calm and cooperative since 1700. Pt punched a wall prior to evening shift. Pt has a swollen knuckle on his right hand ring finger and pinky finger because of it. Pt had tough start to the evening shift, was mad about hearing voices; kept hitting the wall with his legs, writer came to check in with him, pt was not responsive he was selective about who he can answer too. Pt kept hitting the wall again with his legs, another staff member came with writer to pt room to check in. Pt was recpetive by nodding his head when he was asked if he was in pain and denied pain. Staff asked pt what was bothering him. Pt stated \"I can not with these voices anymore, its too much its the only thing I hear nothing is helping I need medication\". Pt had already taken Zyprexa 10mg at 1416 so he was not due for one at 1600. Writer called the Doctor and asked about an intervention to help the pt. The Doctor suggested Pharmacological interventions like, Distress toltrence techinuque, ice pack for any pain, and paced breathing. Writer spoke with pt about these interventions and pt was hopeful and was willing to participate. Writer took pt to the group room and tried the interventions for 30 minutes with music. Pt became calm and stated he was feeling a bit better. X-ray was ordered by the Doctor for the swollen hand and it was done by the imaging tech.     Pt is medication adherent, Zyprexa 10 mg was given at 2028 for voices. Pt is actively hearing voices that are telling him to harm himself. Denies SIB. Pt is anxious and depressed. Denies delusions and paranoia. Pt stated he does not want to get off of the SIO because he thinks it will not benefit him. Pt stated \"I hope you know I want to get better, I am trying to get better\". Pt is hopeful. Patient has no other mental health concerns.     Pt has not had a bowel movement " "in over three days and is concerned. Pt has not showered today and looks unkempt. Pt stated he has no medication side effects. No other medical concerns besides the hand pain. Tylenol was offered for the pain, pt declined.     BP (!) 145/94 (BP Location: Right arm)   Pulse 100   Temp 98.2  F (36.8  C) (Temporal)   Resp 18   Ht 1.676 m (5' 6\")   Wt 105.8 kg (233 lb 4.8 oz)   SpO2 93%   BMI 37.66 kg/m       Last 24H PRN:     acetaminophen (TYLENOL) tablet 650 mg, 650 mg at 08/23/23 1605    artificial saliva (BIOTENE DRY MOUTHWASH) liquid 15 mL, 15 mL at 08/23/23 1605    aspirin-acetaminophen-caffeine (EXCEDRIN MIGRAINE) per tablet 1 tablet, 1 tablet at 08/23/23 0523    clonazePAM (klonoPIN) tablet 0.5 mg, 0.5 mg at 08/23/23 0807    nicotine polacrilex (NICORETTE) gum 4 mg, 4 mg at 08/23/23 2208    OLANZapine zydis (zyPREXA) ODT tab 5-10 mg, 10 mg at 08/23/23 2028                          "

## 2023-08-23 NOTE — PLAN OF CARE
"Pt was seen on the unit this morning requesting for his BS to be checked due to having a bad headache. BS was 230. Pt given PRN tylenol and scheduled AM medications. Pt later c/o numbness of the cheeks, lips, and nose. Reports that this happened a couple days ago as well but it went away on its own. No swelling of the tongue, vitals stable.  notified and expressed how symptoms could be due to hyperventilation from anxiety and most likely not an allergic reaction. Pt encouraged to do deep breathing exercises throughout the day. Pt endorses AH telling him to hurt himself. He contracts for safety while on the unit. Pt requests to be woken up at 0200 for BS checks due to having headaches every morning instead of staff letting him sleep through it.     BP (!) 135/93 (BP Location: Left arm, Patient Position: Sitting, Cuff Size: Adult Large)   Pulse 113   Temp 97.4  F (36.3  C) (Temporal)   Resp 18   Ht 1.676 m (5' 6\")   Wt 105.8 kg (233 lb 4.8 oz)   SpO2 95%   BMI 37.66 kg/m      Problem: Adult Behavioral Health Plan of Care  Goal: Adheres to Safety Considerations for Self and Others  Outcome: Progressing  "

## 2023-08-23 NOTE — PROGRESS NOTES
[facility], [place]   Psychiatric Progress Note  Hospital Day: 10        Interim History:   The patient's care was discussed with the treatment team during the daily team meeting and/or staff's chart notes were reviewed.  Patient slept most of the night, last night endorsed SI after SIO was discontinued. Headache this am and given excedrin.     Upon interview, Prakash stated that suicidal thoughts are still present and that he does not believe that the hospitalization is helping very much so far or the medication that was started last week.  He said he feels nervous about talking with the doctor team.  He says he was feeling really anxious yesterday and he tried a few coping skills including ice packs on the face and drawing which helped.  He says he would like to try going off of the SIO tomorrow and would like to discharge next week if possible.  He says he does not recall testosterone worsening mood swings or making suicidal ideation any worse.  He says he is hopeful about the fact that his  gave him an update that he might be able to move back into his old apartment after discharge.    Suicidal ideation: as above    Homicidal ideation: denies current or recent homicidal ideation or behaviors.    Psychotic symptoms: Patient reports command AH telling him to kill himself, Denies VH, paranoia.      Medication side effects reported: Some dry mouth, otherwise no significant side effects.    Acute medical concerns: none    Other issues reported by patient: Patient had no further questions or concerns.           Medications:      amLODIPine  10 mg Oral Daily    busPIRone  15 mg Oral TID    doxycycline hyclate  100 mg Oral BID    gabapentin  1,200 mg Oral TID    insulin aspart  1-7 Units Subcutaneous TID AC    insulin aspart  1-5 Units Subcutaneous At Bedtime    insulin glargine  20 Units Subcutaneous QAM AC    lithium ER  900 mg Oral At Bedtime    melatonin  10 mg Oral QPM     "nicotine  1 patch Transdermal Daily    nicotine   Transdermal Q8H    pantoprazole  40 mg Oral QAM AC    polyethylene glycol  17 g Oral Daily    QUEtiapine  400 mg Oral At Bedtime    testosterone  20.25 mg Transdermal Daily    trifluoperazine  2 mg Oral BID          Allergies:     Allergies   Allergen Reactions    Haldol [Haloperidol] Other (See Comments)     Makes patient very angry and anxious    Adhesive Tape Hives    Percocet [Oxycodone-Acetaminophen] Nausea and Vomiting    Prednisone Other (See Comments) and Hives     Suicidal ideation    Risperidone Other (See Comments)    Tramadol Hcl Nausea and Vomiting    Droperidol Anxiety    Seroquel [Quetiapine] Palpitations     Spent 2 weeks in the hospital due to having seroquel, caused palpitations and QT prolongation          Labs:     Recent Results (from the past 24 hour(s))   Glucose by meter    Collection Time: 08/22/23  5:21 PM   Result Value Ref Range    GLUCOSE BY METER POCT 239 (H) 70 - 99 mg/dL   Glucose by meter    Collection Time: 08/22/23 10:14 PM   Result Value Ref Range    GLUCOSE BY METER POCT 277 (H) 70 - 99 mg/dL   Glucose by meter    Collection Time: 08/23/23  7:43 AM   Result Value Ref Range    GLUCOSE BY METER POCT 230 (H) 70 - 99 mg/dL   Glucose by meter    Collection Time: 08/23/23 12:05 PM   Result Value Ref Range    GLUCOSE BY METER POCT 187 (H) 70 - 99 mg/dL          Psychiatric Examination:     BP (!) 135/93 (BP Location: Left arm, Patient Position: Sitting, Cuff Size: Adult Large)   Pulse 113   Temp 97.4  F (36.3  C) (Temporal)   Resp 18   Ht 1.676 m (5' 6\")   Wt 105.8 kg (233 lb 4.8 oz)   SpO2 95%   BMI 37.66 kg/m    Weight is 233 lbs 4.8 oz  Body mass index is 37.66 kg/m .    Weight over time:  Vitals:    08/13/23 1812   Weight: 105.8 kg (233 lb 4.8 oz)       Orthostatic Vitals         Most Recent      Sitting Orthostatic /89 08/14 0924    Sitting Orthostatic Pulse (bpm) 94 08/14 0924    Standing Orthostatic /92 08/13 " 1600              Cardiometabolic risk assessment. 08/14/23      Reviewed patient profile for cardiometabolic risk factors    Date taken /Value  REFERENCE RANGE   Abdominal Obesity  (Waist Circumference)   See nursing flowsheet Women ?35 in (88 cm)   Men ?40 in (102 cm)      Triglycerides  Triglycerides   Date Value Ref Range Status   05/28/2023 240 (H) <150 mg/dL Final   04/28/2021 317 (H) <150 mg/dL Final       ?150 mg/dL (1.7 mmol/L) or current treatment for elevated triglycerides   HDL cholesterol  HDL Cholesterol   Date Value Ref Range Status   04/28/2021 37 (L) >40 mg/dL Final     Direct Measure HDL   Date Value Ref Range Status   05/28/2023 35 (L) >=40 mg/dL Final   ]   Women <50 mg/dL (1.3 mmol/L) in women or current treatment for low HDL cholesterol  Men <40 mg/dL (1 mmol/L) in men or current treatment for low HDL cholesterol     Fasting plasma glucose (FPG) Lab Results   Component Value Date     08/14/2023     05/05/2023     05/05/2023     05/19/2021      FPG ?100 mg/dL (5.6 mmol/L) or treatment for elevated blood glucose   Blood pressure  BP Readings from Last 3 Encounters:   08/23/23 (!) 135/93   07/21/23 126/87   06/09/23 133/89    Blood pressure ?130/85 mmHg or treatment for elevated blood pressure   Family History  See family history     Mental Status Exam:  Appearance: awake, alert  Attitude:  cooperative  Eye Contact:  good  Mood:  depressed  Affect:  mood congruent, blunted  Speech:  clear, coherent  Language: fluent and intact in English  Psychomotor, Gait, Musculoskeletal:  no evidence of tardive dyskinesia, dystonia, or tics  Throught Process:  linear, disorganized and illogical  Associations:  no loose associations  Thought Content:  no evidence of suicidal ideation or homicidal ideation, auditory hallucinations present and no visual hallucinations present  Insight:  fair  Judgement:  fair  Oriented to:  time, person, and place  Attention Span and Concentration:   fair  Recent and Remote Memory:  fair  Fund of Knowledge:  appropriate           Precautions:     Behavioral Orders   Procedures    Code 1 - Restrict to Unit    Routine Programming     As clinically indicated    Self Injury Precaution    Status 15     Every 15 minutes.    Status Individual Observation     Patient SIO status reviewed with team/RN.  Please also refer to RN/team documentation for add'l detail.    -SIO staff to monitor following which have contributed to patient being on SIO:  Active si with intent  -Possible interventions SIO staff could use to support patient's treatment progress:  Close monitoring   -When following observed, team will review discontinuation of SIO:  Absence of active SI and intent for 24 hours     Order Specific Question:   CONTINUOUS 24 hours / day     Answer:   5 feet     Order Specific Question:   Indications for SIO     Answer:   Suicide risk    Suicide precautions     Patients on Suicide Precautions should have a Combination Diet ordered that includes a Diet selection(s) AND a Behavioral Tray selection for Safe Tray - with utensils, or Safe Tray - NO utensils            Diagnoses:     Schizoaffective disorder, chronic condition with acute exacerbation (H)  PTSD (post-traumatic stress disorder)  Borderline personality disorder (H)  Suicidal ideation         Assessment & Plan:     Assessment and hospital summary:  Patient is a 33 year old with past history of schizoaffective disorder, borderline personality disorder and methamphetamine dependence in remission for 7 years who reports increased command auditory hallucinations of God telling him to kill himself.  He has also had 5 nights without sleep and been taking risks and been arrested twice in the last 2 weeks.  He most likely is in a mixed state episode with psychosis.  Dr. Torres stopped Adderall and increased Seroquel to 300mg at bedtime. Since then, we have optimized Seroquel. Pt has required SIO monitoring due to  persistent active SI with intent. Buspar was increased to target anxiety and lithium was initiated upon admission.  Patient was initially requesting ECT, and this is being considered pending treatment response to lithium.      Today's Changes:  Increase lithium to 900 mg at bedtime    Target psychiatric symptoms and interventions:  continue Seroquel to 400 mg at bedtime   continue Lithium 600 mg at bedtime  Continue melatonin for initial insomnia  HOLDING Adderall  Buspar 15 mg TID  Gabapentin 1200 mg TID  Nicotine replacement  Risks, benefits, and alternatives discussed at length with patient.     Acute Medical Problems and Treatments:  Acute medical concerns:  Left shoulder pain:  Per IM note dated 8/16:   Brief medicine note     Medicine service following up for patient's troponin, which was checked for some left shoulder pain radiating to his chest.  Troponin returned at <6.  No plans for further monitoring at this time.  As pain is reproducible, suspect musculoskeletal pain.  Continue treatment with as needed ibuprofen, which has provided patient relief.  Please refer to consultation note on 8/15 for further details.        Medicine service to sign off at this time.  Please contact or consult us with any new medical questions or concerns.     Yolie Rodriguez PA-C    Type 2 diabetes mellitus  See trends below. Patient is prescribed Lantus 15 units every morning and Ozempic weekly at baseline. He has not been compliant with this regimen for months. He states he believes that insulin is poison, that he was told this by someone. Most recent A1c in May was 9.1%.  A1c checked and is 8.8% here.  Patient was initially refusing Lantus here. After some education and discussion, patient was willing to try taking the Lantus here.   - Increase Lantus from 17 units to 20 units daily  - Increase low sliding scale insulin to medium sliding on 6/22  - Plan to resume Ozempic after discharge  - hypoglycemia protocol  - glucose  monitoring POC PRN  - recommend followup with PCP at discharge for continued management     Pertinent labs/imaging:  Reviewed    Behavioral/Psychological/Social:  - Encourage unit programming    Safety:  - Continue precautions as noted above  - Status 15 minute checks  - SIO in place for active SI with intent    Legal Status: Patient is committed    Disposition Plan   Reason for ongoing admission: poses an imminent risk to self  Discharge location: group home  Discharge Medications: not ordered  Follow-up Appointments: not scheduled    Entered by: David Vegas DO on 8/17/2023 at 12:28 PM

## 2023-08-23 NOTE — PLAN OF CARE
"Upon approach, the pt was calm, cooperative, and friendly. Prakash denied any pain or discomfort and rated anxiety 6/10 and depression 2/10. Prakash did report hearing voices and said their SI was high today. They were in and out of the milieu periodically Around dinner time the pt became more upset, the writer went to the patient's room and Prakash was crying in their bed, No SIB was noted and the pt requested just some time and space. Prakash shortly after came to RN and asked for Zyprexa 10mg around 1800. Dinner arrived shortly after and Elliot BG before dinner was 239, 2 units given. At bedtime, BG was 277, and 2 units were given. Pt ate dinner and had no snacks. Around 1900 the pt became more upset, reporting a lot of guilt surrounding his service dog being left without him. He said his SI was really bad and wanted to \"wrap a blanket around his neck right now\" The Writer reassured pt that 1:1 was there to help as well as a writer. He did request another PRN and PRN Hydorxyzine 50mg was given at 1900. He then used other copping mechanisms to help such as playing card games, listening to music, reading, etc. No further issues or concerns. Continue to monitor and assess.   Problem: Adult Behavioral Health Plan of Care  Goal: Plan of Care Review  Outcome: Progressing  Flowsheets (Taken 8/22/2023 1631)  Patient Agreement with Plan of Care: agrees     Problem: Suicidal Behavior  Goal: Suicidal Behavior is Absent or Managed  Outcome: Progressing     "

## 2023-08-23 NOTE — CARE PLAN
"   08/23/23 1436   Group Therapy Session   Group Attendance attended group session   Time Session Began 1300   Time Session Ended 1345   Total Time (minutes) 20 (No Charge)   Total # Attendees 3   Group Type Occupational Therapy   Group Topic Covered balanced lifestyle;coping skills/lifestyle management;leisure exploration/use of leisure time;emotions/expression;relaxation techniques;self-care activities   Group Session Detail General Health and Coping   Patient Participation Detail Intervention: General Health and Coping Group with 2 peers.    Patient Response: Pt joined group with [facility] staff present. Pt participated in group focused on the use of positive affirmations as a healthy coping skill. Group involved discussion of positive affirmations and creating a personal affirmations project using personally selected meaningful affirmations. Joined group after finding out there was a creative portion of the group, initially declining stating they did not have enough attention span at this time. Worked on project for about 20 minutes before stating \"I have the attention span of a bug today, so I need to leave\". Writer reminded patient that earlier today they were not able to join d/t not being able to focus and that staying for a half a group was an improvement. Patient thanked writer for acknowledging this and left group.     Cognition: Distractible, Restless       Mood/Affect: Pleasant     Plan: Patient encouraged to maintain attendance for continued ongoing support in working towards occupational therapy goals to support overall treatment/care.          "

## 2023-08-23 NOTE — PLAN OF CARE
Goal Outcome Evaluation: Day Treatment/ IOP accepting Medicare  Pref: In person, areas of focus? Writer to assume CD/ MH programs    [facility]   [address] [place] [address]  P: [phone] F: [phone]  Note: Offers ADT and IOP @ this location only.  Appointments and Referrals: [email or web address]  **6 types of IOPs offered.  Referral form: [email or web address]    [place]  [address] [place] [address], [address]  [place] [address]   Phone: [phone] Fax: [phone]  Notes: Multiple locations near [facility].    -Lucie Fagan  Adult Behavioral Health Care Coordinator

## 2023-08-23 NOTE — PLAN OF CARE
"Goal Outcome Evaluation:    Prakash appeared to sleep a total of 6.25 hours this night shift.  He awoke with \"a very bad headache\" and received Excedrin for the headache.  Declined BG and HS BG was 277.  No snacks were requested or provided.  Remains on 1:1 staffing.                        "

## 2023-08-23 NOTE — PROGRESS NOTES
CLINICAL NUTRITION SERVICES - REASSESSMENT NOTE     Nutrition Prescription    RECOMMENDATIONS FOR MDs/PROVIDERS TO ORDER:  N/A    Malnutrition Status:    Does not meet malnutrition criteria    Recommendations already ordered by Registered Dietitian (RD):  Glucerna BLD, pt request    Future/Additional Recommendations:  Monitor weight trends, intakes and supplement tolerance       EVALUATION OF THE PROGRESS TOWARD GOALS   Diet: Regular  Nutrition Support: Glucerna breakfast only  Intake: Fair, pt self report       NEW FINDINGS   Pt reports TID meals and consuming at least 50% of meals. Pt requested to have Glucerna with all meals. Per notes, pt has not had a BM in several days.   08/24/23 4648824.5 kg (241 lb 4.8 oz)Standing scale  08/13/23 7414368.8 kg (233 lb 4.8 oz)--     Medication Review:  Novolog  Lantus  Lithium  Protonix  Miralax (last given 0758 8/24)  Androgel    MALNUTRITION  % Intake: < 75% for > 7 days (moderate) pt self report  % Weight Loss: Weight loss does not meet criteria  Subcutaneous Fat Loss: None observed  Muscle Loss: None observed  Fluid Accumulation/Edema: None noted  Malnutrition Diagnosis: Patient does not meet two of the established criteria necessary for diagnosing malnutrition    Previous Goals   Patient to consume % of nutritionally adequate meal trays TID, or the equivalent with supplements/snacks.   Evaluation: Not met    Previous Nutrition Diagnosis  Predicted inadequate nutrient intake related to decreased appetite as evidenced by pt self report   Evaluation: No change    CURRENT NUTRITION DIAGNOSIS  Predicted inadequate nutrient intake related to decreased appetite as evidenced by pt self report     INTERVENTIONS  Implementation  Medical food supplement therapy    Goals  Patient to consume % of nutritionally adequate meal trays TID, or the equivalent with supplements/snacks.    Monitoring/Evaluation  Progress toward goals will be monitored and evaluated per  protocol.    Lissett Ramires RDN LD  Mental Health Pager (M-F): [phone]  On Call Pager (weekends only): [phone]

## 2023-08-24 LAB
ALBUMIN UR-MCNC: NEGATIVE MG/DL
APPEARANCE UR: CLEAR
BILIRUB UR QL STRIP: NEGATIVE
COLOR UR AUTO: ABNORMAL
GLUCOSE BLDC GLUCOMTR-MCNC: 198 MG/DL (ref 70–99)
GLUCOSE BLDC GLUCOMTR-MCNC: 264 MG/DL (ref 70–99)
GLUCOSE BLDC GLUCOMTR-MCNC: 271 MG/DL (ref 70–99)
GLUCOSE BLDC GLUCOMTR-MCNC: 280 MG/DL (ref 70–99)
GLUCOSE UR STRIP-MCNC: >=1000 MG/DL
HGB UR QL STRIP: NEGATIVE
KETONES UR STRIP-MCNC: ABNORMAL MG/DL
LEUKOCYTE ESTERASE UR QL STRIP: NEGATIVE
NITRATE UR QL: NEGATIVE
PH UR STRIP: 6.5 [PH] (ref 5–7)
RBC URINE: 0 /HPF
SP GR UR STRIP: 1.02 (ref 1–1.03)
SQUAMOUS EPITHELIAL: 5 /HPF
UROBILINOGEN UR STRIP-MCNC: NORMAL MG/DL
WBC URINE: <1 /HPF

## 2023-08-24 PROCEDURE — 250N000013 HC RX MED GY IP 250 OP 250 PS 637: Performed by: PHYSICIAN ASSISTANT

## 2023-08-24 PROCEDURE — 250N000013 HC RX MED GY IP 250 OP 250 PS 637: Performed by: PSYCHIATRY & NEUROLOGY

## 2023-08-24 PROCEDURE — 124N000002 HC R&B MH UMMC

## 2023-08-24 PROCEDURE — 81001 URINALYSIS AUTO W/SCOPE: CPT | Performed by: PSYCHIATRY & NEUROLOGY

## 2023-08-24 PROCEDURE — 250N000013 HC RX MED GY IP 250 OP 250 PS 637: Performed by: STUDENT IN AN ORGANIZED HEALTH CARE EDUCATION/TRAINING PROGRAM

## 2023-08-24 PROCEDURE — 250N000011 HC RX IP 250 OP 636: Performed by: FAMILY MEDICINE

## 2023-08-24 PROCEDURE — 250N000013 HC RX MED GY IP 250 OP 250 PS 637: Performed by: INTERNAL MEDICINE

## 2023-08-24 PROCEDURE — 99231 SBSQ HOSP IP/OBS SF/LOW 25: CPT | Performed by: PSYCHIATRY & NEUROLOGY

## 2023-08-24 PROCEDURE — 250N000013 HC RX MED GY IP 250 OP 250 PS 637: Performed by: FAMILY MEDICINE

## 2023-08-24 PROCEDURE — 250N000013 HC RX MED GY IP 250 OP 250 PS 637: Performed by: EMERGENCY MEDICINE

## 2023-08-24 RX ORDER — DOCUSATE SODIUM 100 MG/1
100 CAPSULE, LIQUID FILLED ORAL 2 TIMES DAILY
Status: DISCONTINUED | OUTPATIENT
Start: 2023-08-24 | End: 2023-08-28 | Stop reason: HOSPADM

## 2023-08-24 RX ORDER — BISACODYL 5 MG
5 TABLET, DELAYED RELEASE (ENTERIC COATED) ORAL DAILY PRN
Status: DISCONTINUED | OUTPATIENT
Start: 2023-08-24 | End: 2023-08-28 | Stop reason: HOSPADM

## 2023-08-24 RX ADMIN — LITHIUM CARBONATE 900 MG: 450 TABLET, EXTENDED RELEASE ORAL at 19:22

## 2023-08-24 RX ADMIN — NICOTINE POLACRILEX 4 MG: 4 GUM, CHEWING BUCCAL at 14:48

## 2023-08-24 RX ADMIN — Medication 15 ML: at 11:41

## 2023-08-24 RX ADMIN — NICOTINE POLACRILEX 4 MG: 4 GUM, CHEWING BUCCAL at 20:50

## 2023-08-24 RX ADMIN — CLONAZEPAM 0.5 MG: 0.5 TABLET ORAL at 13:16

## 2023-08-24 RX ADMIN — NICOTINE POLACRILEX 4 MG: 4 GUM, CHEWING BUCCAL at 22:02

## 2023-08-24 RX ADMIN — OLANZAPINE 10 MG: 5 TABLET, ORALLY DISINTEGRATING ORAL at 19:49

## 2023-08-24 RX ADMIN — GABAPENTIN 1200 MG: 600 TABLET, FILM COATED ORAL at 08:00

## 2023-08-24 RX ADMIN — NICOTINE POLACRILEX 4 MG: 4 GUM, CHEWING BUCCAL at 16:56

## 2023-08-24 RX ADMIN — Medication 15 ML: at 20:50

## 2023-08-24 RX ADMIN — NICOTINE 1 PATCH: 21 PATCH, EXTENDED RELEASE TRANSDERMAL at 07:20

## 2023-08-24 RX ADMIN — Medication 15 ML: at 18:35

## 2023-08-24 RX ADMIN — NICOTINE POLACRILEX 4 MG: 4 GUM, CHEWING BUCCAL at 06:37

## 2023-08-24 RX ADMIN — NICOTINE POLACRILEX 4 MG: 4 GUM, CHEWING BUCCAL at 12:45

## 2023-08-24 RX ADMIN — GABAPENTIN 1200 MG: 600 TABLET, FILM COATED ORAL at 19:22

## 2023-08-24 RX ADMIN — AMLODIPINE BESYLATE 10 MG: 10 TABLET ORAL at 07:58

## 2023-08-24 RX ADMIN — DOCUSATE SODIUM 100 MG: 100 CAPSULE, LIQUID FILLED ORAL at 20:50

## 2023-08-24 RX ADMIN — NICOTINE POLACRILEX 4 MG: 4 GUM, CHEWING BUCCAL at 11:41

## 2023-08-24 RX ADMIN — TRIFLUOPERAZINE HYDROCHLORIDE 2 MG: 2 TABLET, FILM COATED ORAL at 19:23

## 2023-08-24 RX ADMIN — NICOTINE POLACRILEX 4 MG: 4 GUM, CHEWING BUCCAL at 09:38

## 2023-08-24 RX ADMIN — ONDANSETRON 4 MG: 4 TABLET, ORALLY DISINTEGRATING ORAL at 09:02

## 2023-08-24 RX ADMIN — DOXYCYCLINE HYCLATE 100 MG: 50 CAPSULE ORAL at 19:22

## 2023-08-24 RX ADMIN — GABAPENTIN 1200 MG: 600 TABLET, FILM COATED ORAL at 13:37

## 2023-08-24 RX ADMIN — BUSPIRONE HYDROCHLORIDE 15 MG: 15 TABLET ORAL at 07:58

## 2023-08-24 RX ADMIN — ACETAMINOPHEN, ASPIRIN, CAFFEINE 1 TABLET: 250; 65; 250 TABLET, FILM COATED ORAL at 06:28

## 2023-08-24 RX ADMIN — BUSPIRONE HYDROCHLORIDE 15 MG: 15 TABLET ORAL at 13:37

## 2023-08-24 RX ADMIN — BUSPIRONE HYDROCHLORIDE 15 MG: 15 TABLET ORAL at 19:22

## 2023-08-24 RX ADMIN — Medication 10 MG: at 19:23

## 2023-08-24 RX ADMIN — NICOTINE POLACRILEX 4 MG: 4 GUM, CHEWING BUCCAL at 13:48

## 2023-08-24 RX ADMIN — QUETIAPINE FUMARATE 400 MG: 400 TABLET ORAL at 19:22

## 2023-08-24 RX ADMIN — DOXYCYCLINE HYCLATE 100 MG: 50 CAPSULE ORAL at 07:58

## 2023-08-24 RX ADMIN — NICOTINE POLACRILEX 4 MG: 4 GUM, CHEWING BUCCAL at 19:23

## 2023-08-24 RX ADMIN — TESTOSTERONE 20.25 MG: 20.25 GEL TOPICAL at 07:58

## 2023-08-24 RX ADMIN — PANTOPRAZOLE SODIUM 40 MG: 40 TABLET, DELAYED RELEASE ORAL at 07:58

## 2023-08-24 RX ADMIN — NICOTINE POLACRILEX 4 MG: 4 GUM, CHEWING BUCCAL at 18:12

## 2023-08-24 RX ADMIN — HYDROXYZINE HYDROCHLORIDE 50 MG: 50 TABLET, FILM COATED ORAL at 22:01

## 2023-08-24 RX ADMIN — NICOTINE POLACRILEX 4 MG: 4 GUM, CHEWING BUCCAL at 08:11

## 2023-08-24 RX ADMIN — POLYETHYLENE GLYCOL 3350 17 G: 17 POWDER, FOR SOLUTION ORAL at 07:58

## 2023-08-24 RX ADMIN — TRIFLUOPERAZINE HYDROCHLORIDE 2 MG: 2 TABLET, FILM COATED ORAL at 08:00

## 2023-08-24 ASSESSMENT — ACTIVITIES OF DAILY LIVING (ADL)
ADLS_ACUITY_SCORE: 42
LAUNDRY: WITH SUPERVISION
ADLS_ACUITY_SCORE: 42
ORAL_HYGIENE: INDEPENDENT
DRESS: INDEPENDENT
ADLS_ACUITY_SCORE: 42
HYGIENE/GROOMING: INDEPENDENT

## 2023-08-24 NOTE — PLAN OF CARE
Problem: Sleep Disturbance  Goal: Adequate Sleep/Rest  Outcome: Progressing   Goal Outcome Evaluation:    Prakash appeared to sleep a total of 6.75 hours this night shift.  Up and socializing in the lounge now.  Reports having a migraine currently and Excedrin Migraine given.  BG last evening was 310 and at 0630 it was 371.  3 units Novolog given and will have the Lantus right before breakfast.  Now wanting his BG done at the 0200 time as he has been waking up with a migraine and attributes it to blood sugar being high.  Out in the lounge now socializing with peers.  Calm, appreciative, and cooperative.

## 2023-08-24 NOTE — PROGRESS NOTES
Received a call from Josie, Patients [facility] outpatient  who works with [facility] ([phone]). Discussed with josie current discharge planning as well as outpatient care planning. Josie requested to receive a call back from inpatient provider Dr. Mendoza tomorrow. Josie attempted to talk with Prakash over the phone but he refused to take the call. Josie indicated plan to attempt to call back to speak with patient tomorrow and information was shared with the patient.

## 2023-08-24 NOTE — PLAN OF CARE
Pt was seen participating in group this afternoon. His SIO was discontinued and he is aware. Pt requested to have his fingers taped up from his incident yesterday. He was informed that based off the xray there is no evidence to need them to be taped up. He expressed understanding and agreed. Pt given PRN biotene and nicotine gum. Pt continues to have AH telling him to harm himself. He does not endorse SIB and contracts for safety. Denies all other psych symptoms.  Writer walked pt through some breathing exercises later in the afternoon as he expressed he wanted to stay away from PRNs for anxiety. Later in the afternoon, rated anxiety to be at a 10/10 and requested PRN klonopin.     Problem: Adult Behavioral Health Plan of Care  Goal: Optimized Coping Skills in Response to Life Stressors  Outcome: Progressing

## 2023-08-24 NOTE — PROGRESS NOTES
"[facility], [place]   Psychiatric Progress Note  Hospital Day: 11        Interim History:   The patient's care was discussed with the treatment team during the daily team meeting and/or staff's chart notes were reviewed.    Patient has been calm and cooperative since 1700. Pt punched a wall prior to evening shift. Pt has a swollen knuckle on his right hand ring finger and pinky finger because of it. Pt had tough start to the evening shift, was mad about hearing voices; kept hitting the wall with his legs, writer came to check in with him, pt was not responsive he was selective about who he can answer too. Pt kept hitting the wall again with his legs, another staff member came with writer to pt room to check in. Pt was recpetive by nodding his head when he was asked if he was in pain and denied pain. Staff asked pt what was bothering him. Pt stated \"I can not with these voices anymore, its too much its the only thing I hear nothing is helping I need medication\". Pt had already taken Zyprexa 10mg at 1416 so he was not due for one at 1600. Writer called the Doctor and asked about an intervention to help the pt. The Doctor suggested Pharmacological interventions like, Distress toltrence techinuque, ice pack for any pain, and paced breathing. Writer spoke with pt about these interventions and pt was hopeful and was willing to participate. Writer took pt to the group room and tried the interventions for 30 minutes with music. Pt became calm and stated he was feeling a bit better. X-ray was ordered by the Doctor for the swollen hand and it was done by the imaging tech.   Pt is medication adherent, Zyprexa 10 mg was given at 2028 for voices. Pt is actively hearing voices that are telling him to harm himself. Denies SIB. Pt is anxious and depressed. Denies delusions and paranoia. Pt stated he does not want to get off of the SIO because he thinks it will not benefit him. Pt stated \"I hope you know " "I want to get better, I am trying to get better\". Pt is hopeful. Patient has no other mental health concerns.     Upon interview, Prakash stated that suicidal thoughts are still present and said that yesterday he punched a wall because the voices told him to and it was impulsive.  We discussed events leading up to this decision.  Cannot recall an upsetting event.  He says that usually conflicts with staff at his group home usually perceived self harm rather than conflicts with other peers.  He agreed to let staff know if his urge of self-harm or negative emotions are becoming overwhelming to the point of feeling unsafe.  He says he feels like it is punishment being here and says this is similar to correction in his mind.    Suicidal ideation: as above    Homicidal ideation: denies current or recent homicidal ideation or behaviors.    Psychotic symptoms: Patient reports command AH telling him to kill himself, Denies VH, paranoia.      Medication side effects reported: Some dry mouth, otherwise no significant side effects.    Acute medical concerns: none    Other issues reported by patient: Patient had no further questions or concerns.           Medications:      amLODIPine  10 mg Oral Daily    busPIRone  15 mg Oral TID    doxycycline hyclate  100 mg Oral BID    gabapentin  1,200 mg Oral TID    insulin aspart  1-10 Units Subcutaneous TID AC    insulin aspart  1-7 Units Subcutaneous At Bedtime    insulin glargine  20 Units Subcutaneous QAM AC    lithium ER  900 mg Oral At Bedtime    melatonin  10 mg Oral QPM    nicotine  1 patch Transdermal Daily    nicotine   Transdermal Q8H    pantoprazole  40 mg Oral QAM AC    polyethylene glycol  17 g Oral Daily    QUEtiapine  400 mg Oral At Bedtime    testosterone  20.25 mg Transdermal Daily    trifluoperazine  2 mg Oral BID          Allergies:     Allergies   Allergen Reactions    Haldol [Haloperidol] Other (See Comments)     Makes patient very angry and anxious    Adhesive Tape Hives " "   Percocet [Oxycodone-Acetaminophen] Nausea and Vomiting    Prednisone Other (See Comments) and Hives     Suicidal ideation    Risperidone Other (See Comments)    Tramadol Hcl Nausea and Vomiting    Droperidol Anxiety    Seroquel [Quetiapine] Palpitations     Spent 2 weeks in the hospital due to having seroquel, caused palpitations and QT prolongation          Labs:     Recent Results (from the past 24 hour(s))   Glucose by meter    Collection Time: 08/23/23 12:05 PM   Result Value Ref Range    GLUCOSE BY METER POCT 187 (H) 70 - 99 mg/dL   Glucose by meter    Collection Time: 08/23/23  5:34 PM   Result Value Ref Range    GLUCOSE BY METER POCT 235 (H) 70 - 99 mg/dL   Glucose by meter    Collection Time: 08/23/23  8:39 PM   Result Value Ref Range    GLUCOSE BY METER POCT 310 (H) 70 - 99 mg/dL   Glucose by meter    Collection Time: 08/24/23  6:32 AM   Result Value Ref Range    GLUCOSE BY METER POCT 271 (H) 70 - 99 mg/dL          Psychiatric Examination:     BP (!) 135/91 (BP Location: Left arm, Patient Position: Sitting, Cuff Size: Adult Regular)   Pulse 98   Temp 97.8  F (36.6  C) (Temporal)   Resp 18   Ht 1.676 m (5' 6\")   Wt 109.5 kg (241 lb 4.8 oz)   SpO2 96%   BMI 38.95 kg/m    Weight is 241 lbs 4.8 oz  Body mass index is 38.95 kg/m .    Weight over time:  Vitals:    08/13/23 1812 08/24/23 0800   Weight: 105.8 kg (233 lb 4.8 oz) 109.5 kg (241 lb 4.8 oz)       Orthostatic Vitals         Most Recent      Sitting Orthostatic /89 08/14 0924    Sitting Orthostatic Pulse (bpm) 94 08/14 0924    Standing Orthostatic /92 08/13 1600              Cardiometabolic risk assessment. 08/14/23      Reviewed patient profile for cardiometabolic risk factors    Date taken /Value  REFERENCE RANGE   Abdominal Obesity  (Waist Circumference)   See nursing flowsheet Women ?35 in (88 cm)   Men ?40 in (102 cm)      Triglycerides  Triglycerides   Date Value Ref Range Status   05/28/2023 240 (H) <150 mg/dL Final "   04/28/2021 317 (H) <150 mg/dL Final       ?150 mg/dL (1.7 mmol/L) or current treatment for elevated triglycerides   HDL cholesterol  HDL Cholesterol   Date Value Ref Range Status   04/28/2021 37 (L) >40 mg/dL Final     Direct Measure HDL   Date Value Ref Range Status   05/28/2023 35 (L) >=40 mg/dL Final   ]   Women <50 mg/dL (1.3 mmol/L) in women or current treatment for low HDL cholesterol  Men <40 mg/dL (1 mmol/L) in men or current treatment for low HDL cholesterol     Fasting plasma glucose (FPG) Lab Results   Component Value Date     08/14/2023     05/05/2023     05/05/2023     05/19/2021      FPG ?100 mg/dL (5.6 mmol/L) or treatment for elevated blood glucose   Blood pressure  BP Readings from Last 3 Encounters:   08/24/23 (!) 135/91   07/21/23 126/87   06/09/23 133/89    Blood pressure ?130/85 mmHg or treatment for elevated blood pressure   Family History  See family history     Mental Status Exam:  Appearance: awake, alert  Attitude:  cooperative  Eye Contact:  good  Mood:  depressed  Affect:  mood congruent, blunted  Speech:  clear, coherent  Language: fluent and intact in English  Psychomotor, Gait, Musculoskeletal:  no evidence of tardive dyskinesia, dystonia, or tics  Throught Process:  linear, disorganized and illogical  Associations:  no loose associations  Thought Content:  no evidence of suicidal ideation or homicidal ideation, auditory hallucinations present and no visual hallucinations present  Insight:  fair  Judgement:  fair  Oriented to:  time, person, and place  Attention Span and Concentration:  fair  Recent and Remote Memory:  fair  Fund of Knowledge:  appropriate           Precautions:     Behavioral Orders   Procedures    Code 1 - Restrict to Unit    Routine Programming     As clinically indicated    Self Injury Precaution    Status 15     Every 15 minutes.    Suicide precautions     Patients on Suicide Precautions should have a Combination Diet ordered that  includes a Diet selection(s) AND a Behavioral Tray selection for Safe Tray - with utensils, or Safe Tray - NO utensils            Diagnoses:     Schizoaffective disorder, chronic condition with acute exacerbation (H)  PTSD (post-traumatic stress disorder)  Borderline personality disorder (H)  Suicidal ideation         Assessment & Plan:     Assessment and hospital summary:  Patient is a 33 year old with past history of schizoaffective disorder, borderline personality disorder and methamphetamine dependence in remission for 7 years who reports increased command auditory hallucinations of God telling him to kill himself.  He has also had 5 nights without sleep and been taking risks and been arrested twice in the last 2 weeks.  He most likely is in a mixed state episode with psychosis.  Dr. Torres stopped Adderall and increased Seroquel to 300mg at bedtime. Since then, we have optimized Seroquel. Pt has required SIO monitoring due to persistent active SI with intent. Buspar was increased to target anxiety and lithium was initiated upon admission.  Patient was initially requesting ECT, and this is being considered pending treatment response to lithium.      Today's Changes:  No changes    Target psychiatric symptoms and interventions:  continue Seroquel to 400 mg at bedtime   continue Lithium 600 mg at bedtime  Continue melatonin for initial insomnia  HOLDING Adderall  Buspar 15 mg TID  Gabapentin 1200 mg TID  Nicotine replacement  Risks, benefits, and alternatives discussed at length with patient.     Acute Medical Problems and Treatments:  Acute medical concerns:  Left shoulder pain:  Per IM note dated 8/16:   Brief medicine note     Medicine service following up for patient's troponin, which was checked for some left shoulder pain radiating to his chest.  Troponin returned at <6.  No plans for further monitoring at this time.  As pain is reproducible, suspect musculoskeletal pain.  Continue treatment with as  needed ibuprofen, which has provided patient relief.  Please refer to consultation note on 8/15 for further details.        Medicine service to sign off at this time.  Please contact or consult us with any new medical questions or concerns.     Yolie Rodriguez PA-C    Type 2 diabetes mellitus  See trends below. Patient is prescribed Lantus 15 units every morning and Ozempic weekly at baseline. He has not been compliant with this regimen for months. He states he believes that insulin is poison, that he was told this by someone. Most recent A1c in May was 9.1%.  A1c checked and is 8.8% here.  Patient was initially refusing Lantus here. After some education and discussion, patient was willing to try taking the Lantus here.   - Increase Lantus from 17 units to 20 units daily  - Increase low sliding scale insulin to medium sliding on 6/22  - Plan to resume Ozempic after discharge  - hypoglycemia protocol  - glucose monitoring POC PRN  - recommend followup with PCP at discharge for continued management     Pertinent labs/imaging:  Reviewed    Behavioral/Psychological/Social:  - Encourage unit programming    Safety:  - Continue precautions as noted above  - Status 15 minute checks  - SIO in place for active SI with intent    Legal Status: Patient is committed    Disposition Plan   Reason for ongoing admission: poses an imminent risk to self  Discharge location: group home  Discharge Medications: not ordered  Follow-up Appointments: not scheduled    Entered by: David Vegas DO on 8/17/2023 at 12:03 PM

## 2023-08-24 NOTE — PLAN OF CARE
Assessment/Intervention/Current Symtoms and Care Coordination:  Chart review and met with team, discussed pt progress, symptomology, and response to treatment. Discussed the discharge plan and any potential impediments to discharge. Lithium being titrated to reach therapeutic level. Patient is being encouraged to trial other treatment medications before ECT is consulted.      -Writer met with pt obtained VAL's for [facility] and sent referral for IOP/DBT. Writer contacted [facility] regarding DBT referral and they have 1 provider taking Medicare and he is in [facility]. [facility] no longer providers IOP services.         Discharge Plan or Goal  Back to group [place]     Barriers to Discharge:  Symptom and medication stabilization     Referral Status:  DBT-PTSD  [facility] has openings starting 9/2023  [facility]      Legal Status:  Committed   County: [facility]   File Number: [id number]   Start and expiration date of commitment: 8/14/23-2/14/24     Contacts:  : Cristy at [facility], [phone](VAL signed)   [facility]: Deena at its learning, [phone] (VAL signed)   [facility] [place] Director and Nurse: Domeinca [phone] (VAL signed)   [facility] worker: Pretty Guzman at [facility], [phone] (VAL signed)   Psychotherapist: Joshua at [facility], [phone] (VAL signed)       Upcoming Meetings and Dates/Important Information and next steps:

## 2023-08-25 LAB
ALBUMIN SERPL BCG-MCNC: 4.8 G/DL (ref 3.5–5.2)
ALP SERPL-CCNC: 106 U/L (ref 35–129)
ALT SERPL W P-5'-P-CCNC: 92 U/L (ref 0–70)
ANION GAP SERPL CALCULATED.3IONS-SCNC: 13 MMOL/L (ref 7–15)
AST SERPL W P-5'-P-CCNC: 72 U/L (ref 0–45)
BASOPHILS # BLD AUTO: 0.1 10E3/UL (ref 0–0.2)
BASOPHILS NFR BLD AUTO: 1 %
BILIRUB SERPL-MCNC: 0.2 MG/DL
BUN SERPL-MCNC: 11.2 MG/DL (ref 6–20)
CALCIUM SERPL-MCNC: 10.4 MG/DL (ref 8.6–10)
CHLORIDE SERPL-SCNC: 100 MMOL/L (ref 98–107)
CREAT SERPL-MCNC: 0.66 MG/DL (ref 0.51–1.17)
DEPRECATED HCO3 PLAS-SCNC: 25 MMOL/L (ref 22–29)
EOSINOPHIL # BLD AUTO: 0.2 10E3/UL (ref 0–0.7)
EOSINOPHIL NFR BLD AUTO: 2 %
ERYTHROCYTE [DISTWIDTH] IN BLOOD BY AUTOMATED COUNT: 14.6 % (ref 10–15)
GFR SERPL CREATININE-BSD FRML MDRD: >90 ML/MIN/1.73M2
GLUCOSE BLDC GLUCOMTR-MCNC: 247 MG/DL (ref 70–99)
GLUCOSE BLDC GLUCOMTR-MCNC: 255 MG/DL (ref 70–99)
GLUCOSE BLDC GLUCOMTR-MCNC: 267 MG/DL (ref 70–99)
GLUCOSE BLDC GLUCOMTR-MCNC: 275 MG/DL (ref 70–99)
GLUCOSE BLDC GLUCOMTR-MCNC: 301 MG/DL (ref 70–99)
GLUCOSE BLDC GLUCOMTR-MCNC: 351 MG/DL (ref 70–99)
GLUCOSE BLDC GLUCOMTR-MCNC: 374 MG/DL (ref 70–99)
GLUCOSE SERPL-MCNC: 287 MG/DL (ref 70–99)
HCT VFR BLD AUTO: 44.1 % (ref 35–53)
HGB BLD-MCNC: 14.8 G/DL (ref 11.7–17.7)
IMM GRANULOCYTES # BLD: 0.1 10E3/UL
IMM GRANULOCYTES NFR BLD: 1 %
LYMPHOCYTES # BLD AUTO: 2.2 10E3/UL (ref 0.8–5.3)
LYMPHOCYTES NFR BLD AUTO: 23 %
MCH RBC QN AUTO: 27.6 PG (ref 26.5–33)
MCHC RBC AUTO-ENTMCNC: 33.6 G/DL (ref 31.5–36.5)
MCV RBC AUTO: 82 FL (ref 78–100)
MONOCYTES # BLD AUTO: 0.6 10E3/UL (ref 0–1.3)
MONOCYTES NFR BLD AUTO: 6 %
NEUTROPHILS # BLD AUTO: 6.3 10E3/UL (ref 1.6–8.3)
NEUTROPHILS NFR BLD AUTO: 67 %
NRBC # BLD AUTO: 0 10E3/UL
NRBC BLD AUTO-RTO: 0 /100
PLATELET # BLD AUTO: 325 10E3/UL (ref 150–450)
POTASSIUM SERPL-SCNC: 4.1 MMOL/L (ref 3.4–5.3)
PROT SERPL-MCNC: 8.2 G/DL (ref 6.4–8.3)
RBC # BLD AUTO: 5.36 10E6/UL (ref 3.8–5.9)
SODIUM SERPL-SCNC: 138 MMOL/L (ref 136–145)
WBC # BLD AUTO: 9.4 10E3/UL (ref 4–11)

## 2023-08-25 PROCEDURE — 86780 TREPONEMA PALLIDUM: CPT

## 2023-08-25 PROCEDURE — 86696 HERPES SIMPLEX TYPE 2 TEST: CPT

## 2023-08-25 PROCEDURE — 87491 CHLMYD TRACH DNA AMP PROBE: CPT

## 2023-08-25 PROCEDURE — 99232 SBSQ HOSP IP/OBS MODERATE 35: CPT

## 2023-08-25 PROCEDURE — 250N000013 HC RX MED GY IP 250 OP 250 PS 637: Performed by: INTERNAL MEDICINE

## 2023-08-25 PROCEDURE — 250N000013 HC RX MED GY IP 250 OP 250 PS 637: Performed by: STUDENT IN AN ORGANIZED HEALTH CARE EDUCATION/TRAINING PROGRAM

## 2023-08-25 PROCEDURE — 80053 COMPREHEN METABOLIC PANEL: CPT | Performed by: PSYCHIATRY & NEUROLOGY

## 2023-08-25 PROCEDURE — 250N000011 HC RX IP 250 OP 636: Performed by: FAMILY MEDICINE

## 2023-08-25 PROCEDURE — 250N000013 HC RX MED GY IP 250 OP 250 PS 637: Performed by: PSYCHIATRY & NEUROLOGY

## 2023-08-25 PROCEDURE — 250N000013 HC RX MED GY IP 250 OP 250 PS 637: Performed by: PHYSICIAN ASSISTANT

## 2023-08-25 PROCEDURE — 85025 COMPLETE CBC W/AUTO DIFF WBC: CPT | Performed by: PSYCHIATRY & NEUROLOGY

## 2023-08-25 PROCEDURE — 87591 N.GONORRHOEAE DNA AMP PROB: CPT

## 2023-08-25 PROCEDURE — 36415 COLL VENOUS BLD VENIPUNCTURE: CPT | Performed by: PSYCHIATRY & NEUROLOGY

## 2023-08-25 PROCEDURE — 250N000013 HC RX MED GY IP 250 OP 250 PS 637: Performed by: EMERGENCY MEDICINE

## 2023-08-25 PROCEDURE — 36415 COLL VENOUS BLD VENIPUNCTURE: CPT

## 2023-08-25 PROCEDURE — 99233 SBSQ HOSP IP/OBS HIGH 50: CPT | Performed by: PSYCHIATRY & NEUROLOGY

## 2023-08-25 PROCEDURE — 250N000013 HC RX MED GY IP 250 OP 250 PS 637: Performed by: FAMILY MEDICINE

## 2023-08-25 PROCEDURE — 124N000002 HC R&B MH UMMC

## 2023-08-25 PROCEDURE — G0177 OPPS/PHP; TRAIN & EDUC SERV: HCPCS

## 2023-08-25 RX ADMIN — NICOTINE 1 PATCH: 21 PATCH, EXTENDED RELEASE TRANSDERMAL at 08:14

## 2023-08-25 RX ADMIN — DOCUSATE SODIUM 100 MG: 100 CAPSULE, LIQUID FILLED ORAL at 08:14

## 2023-08-25 RX ADMIN — NICOTINE POLACRILEX 4 MG: 4 GUM, CHEWING BUCCAL at 19:09

## 2023-08-25 RX ADMIN — LITHIUM CARBONATE 900 MG: 450 TABLET, EXTENDED RELEASE ORAL at 19:09

## 2023-08-25 RX ADMIN — NICOTINE POLACRILEX 4 MG: 4 GUM, CHEWING BUCCAL at 13:50

## 2023-08-25 RX ADMIN — HYDROXYZINE HYDROCHLORIDE 50 MG: 50 TABLET, FILM COATED ORAL at 18:30

## 2023-08-25 RX ADMIN — NICOTINE POLACRILEX 4 MG: 4 GUM, CHEWING BUCCAL at 14:52

## 2023-08-25 RX ADMIN — DOCUSATE SODIUM 100 MG: 100 CAPSULE, LIQUID FILLED ORAL at 19:09

## 2023-08-25 RX ADMIN — ONDANSETRON 4 MG: 4 TABLET, ORALLY DISINTEGRATING ORAL at 05:31

## 2023-08-25 RX ADMIN — POLYETHYLENE GLYCOL 3350 17 G: 17 POWDER, FOR SOLUTION ORAL at 07:00

## 2023-08-25 RX ADMIN — NICOTINE POLACRILEX 4 MG: 4 GUM, CHEWING BUCCAL at 06:30

## 2023-08-25 RX ADMIN — NICOTINE POLACRILEX 4 MG: 4 GUM, CHEWING BUCCAL at 18:05

## 2023-08-25 RX ADMIN — Medication 10 MG: at 19:08

## 2023-08-25 RX ADMIN — GABAPENTIN 1200 MG: 600 TABLET, FILM COATED ORAL at 13:27

## 2023-08-25 RX ADMIN — TRIFLUOPERAZINE HYDROCHLORIDE 2 MG: 2 TABLET, FILM COATED ORAL at 08:13

## 2023-08-25 RX ADMIN — ACETAMINOPHEN 650 MG: 325 TABLET, FILM COATED ORAL at 13:50

## 2023-08-25 RX ADMIN — OLANZAPINE 5 MG: 5 TABLET, ORALLY DISINTEGRATING ORAL at 20:37

## 2023-08-25 RX ADMIN — ACETAMINOPHEN 650 MG: 325 TABLET, FILM COATED ORAL at 21:03

## 2023-08-25 RX ADMIN — TESTOSTERONE 20.25 MG: 20.25 GEL TOPICAL at 08:13

## 2023-08-25 RX ADMIN — AMLODIPINE BESYLATE 10 MG: 10 TABLET ORAL at 08:13

## 2023-08-25 RX ADMIN — BUSPIRONE HYDROCHLORIDE 15 MG: 15 TABLET ORAL at 13:27

## 2023-08-25 RX ADMIN — DOXYCYCLINE HYCLATE 100 MG: 50 CAPSULE ORAL at 08:13

## 2023-08-25 RX ADMIN — NICOTINE POLACRILEX 4 MG: 4 GUM, CHEWING BUCCAL at 10:16

## 2023-08-25 RX ADMIN — TRIFLUOPERAZINE HYDROCHLORIDE 2 MG: 2 TABLET, FILM COATED ORAL at 19:08

## 2023-08-25 RX ADMIN — QUETIAPINE FUMARATE 500 MG: 400 TABLET ORAL at 19:08

## 2023-08-25 RX ADMIN — GABAPENTIN 1200 MG: 600 TABLET, FILM COATED ORAL at 08:13

## 2023-08-25 RX ADMIN — OLANZAPINE 10 MG: 5 TABLET, ORALLY DISINTEGRATING ORAL at 09:29

## 2023-08-25 RX ADMIN — PANTOPRAZOLE SODIUM 40 MG: 40 TABLET, DELAYED RELEASE ORAL at 08:12

## 2023-08-25 RX ADMIN — GABAPENTIN 1200 MG: 600 TABLET, FILM COATED ORAL at 19:09

## 2023-08-25 RX ADMIN — DOXYCYCLINE HYCLATE 100 MG: 50 CAPSULE ORAL at 19:09

## 2023-08-25 RX ADMIN — NICOTINE POLACRILEX 4 MG: 4 GUM, CHEWING BUCCAL at 12:53

## 2023-08-25 RX ADMIN — CLONAZEPAM 0.5 MG: 0.5 TABLET ORAL at 09:29

## 2023-08-25 RX ADMIN — BUSPIRONE HYDROCHLORIDE 15 MG: 15 TABLET ORAL at 08:13

## 2023-08-25 RX ADMIN — BUSPIRONE HYDROCHLORIDE 15 MG: 15 TABLET ORAL at 19:09

## 2023-08-25 RX ADMIN — NICOTINE POLACRILEX 4 MG: 4 GUM, CHEWING BUCCAL at 20:32

## 2023-08-25 RX ADMIN — NICOTINE POLACRILEX 4 MG: 4 GUM, CHEWING BUCCAL at 11:52

## 2023-08-25 RX ADMIN — NICOTINE POLACRILEX 4 MG: 4 GUM, CHEWING BUCCAL at 08:49

## 2023-08-25 RX ADMIN — ACETAMINOPHEN, ASPIRIN, CAFFEINE 1 TABLET: 250; 65; 250 TABLET, FILM COATED ORAL at 04:16

## 2023-08-25 RX ADMIN — NICOTINE POLACRILEX 4 MG: 4 GUM, CHEWING BUCCAL at 07:36

## 2023-08-25 ASSESSMENT — ACTIVITIES OF DAILY LIVING (ADL)
ADLS_ACUITY_SCORE: 42
DRESS: SCRUBS (BEHAVIORAL HEALTH);INDEPENDENT
ADLS_ACUITY_SCORE: 42
ADLS_ACUITY_SCORE: 42
HYGIENE/GROOMING: INDEPENDENT
ADLS_ACUITY_SCORE: 42
ADLS_ACUITY_SCORE: 42
ORAL_HYGIENE: INDEPENDENT
ADLS_ACUITY_SCORE: 42

## 2023-08-25 NOTE — CONSULTS
Discharge Pharmacy Test Claim:    Patient has pharmacy benefits through [email or web address] and [employer]. Per insurance, the following are covered and preferred under the patient's plans:     Test Claim Copay   Jardiance 10mg 0.00   Jardiance 25mg 0.00     Please feel free to contact me with any other test claims, prior authorizations, or insurance questions regarding outpatient medications.     Thanks!  Jewels Bender  Pharmacy Liaison  Teams: Jewels Bender  Phone: [phone]  Email: [email or web address]  August 25, 2023

## 2023-08-25 NOTE — PLAN OF CARE
"Writer assumed care of pt at 11. Pt presented as calm during interaction. Cooperative. Endorsing auditory hallucinations that tell pt that he is a bad person, that he should kill himself, that he isn't worth anything. States has passive SI with no plan. No SIB observed or reported. Rates anxiety at 3/10. Denies depression. Speech is organized. Flat affect. Pt spent much of shift coloring.     BG of 301, 267. Complaining of a headache, rated pain as 8/10, given PRN Tylenol.      No acute behavioral outburst observed or reported when under writer's care. Took scheduled medication without issue. SIO 1:1 maintained for risk of self harm.     Problem: Suicide Risk  Goal: Absence of Self-Harm  Outcome: Progressing    BP (!) 139/95 (BP Location: Right arm, Patient Position: Sitting, Cuff Size: Adult Large)   Pulse 105   Temp 98.5  F (36.9  C) (Temporal)   Resp 16   Ht 1.676 m (5' 6\")   Wt 109.5 kg (241 lb 4.8 oz)   SpO2 94%   BMI 38.95 kg/m        "

## 2023-08-25 NOTE — PROGRESS NOTES
"   08/24/23 6162   Group Therapy Session   Group Attendance attended group session   Time Session Began 1030   Time Session Ended 1215   Total Time (minutes) 90   Total # Attendees 2,2   Group Type psychoeducation;task skill   Group Topic Covered coping skills/lifestyle management;problem-solving;emotions/expression;structured socialization   Group Session Detail topic group, OT clinic   Patient Response/Contribution able to recall/repeat info presented;cooperative with task;discussed personal experience with topic;requested more information about topic     Topic Group ([id number]) During check-in, pt expressed feeling nervous and anxious while waiting to meet with her provider \"because of this\" holding up bandaged fist, and explained to writer how she hit the wall several times.  Pt questioned if they could just skip the meeting, though talked though how that wouldn't accomplish anything.  Talked through \" Your Day\" where pt independently brought up goals of working on distress tolerance skills - specifically breathing techniques, temperature, walking halls, talking to others, and outlined the reason to make it a priority was their own wellbeing and to become self-reliant. By the end of group, pt reported feeling much better, and later said meeting his providers was positive as well.    OT clinic ([id number]) Pt needed encouragement and assistance to move forward with project, often unsure of self, and needs encouragement.  Pt shared news found out in writers absence of potential of moving in to own apartment - discussed looking forward to this possibility.  Pt is not concerned about isolation/lonliness with involvement in outpt programming and DBT, would look forward to having own space and opportunity for independence with meal prep.  "
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
## 2023-08-25 NOTE — PLAN OF CARE
Assessment/Intervention/Current Symtoms and Care Coordination:  Chart review and met with team, discussed pt progress, symptomology, and response to treatment. Discussed the discharge plan and any potential impediments to discharge. Lithium being titrated to reach therapeutic level. Patient is being encouraged to trial other treatment medications before ECT is consulted.      -Writer spoke with Anabel gunderson/[facility] who has some openings starting in September. Writer met with pt discussed [facility] referral was declined due to insurance. Writer obtained VAL for [facility] for DBT services. Pt expressed she hoped to be feeling better.        Discharge Plan or Goal  Back to group home     Barriers to Discharge:  Symptom and medication stabilization     Referral Status:  DBT-PTSD  [facility] has openings starting 9/2023  [facility]-decline they are out of network with pt's   [facility] (DBT)     Legal Status:  Committed   [facility]: [facility]   File Number: [id number]   Start and expiration date of commitment: 8/14/23-2/14/24     Contacts:  : Cristy at [facility], [phone](VAL signed)   [facility]: Deena at [facility], [phone] (VAL signed)   [facility] Director and Nurse: Domenica [phone] (VAL signed)   [facility] worker: Pretty Guzman at [facility], [phone] (VAL signed)   Psychotherapist: Joshua at [facility], [phone] (VAL signed)       Upcoming Meetings and Dates/Important Information and next steps:

## 2023-08-25 NOTE — CONSULTS
"Internal Medicine Progress Note      Assessment and plan:  Ayana \"josie\" VINCENT Prasad is a 33 year old transgender female to male adult with a history of DM2, GERD, HTN, ADHD, bipolar, borderline personality. Medicine following for DM2 and new consult for hematuria.    #Hematuria  #BRBPR  Pt noted blood in toilet. Originally thought to be hematuria- no blood noted on UA. Female anatomy- unclear if is menstruating. This AM he noted blood with stool. Pt refused to speak with writer at this time.   -Unable to order preparation H with steroid allergies  -witch hazel pads to rectum PRN bleeding, pruritus   -Notify medicine with further bleeding concerns    #Type 2 diabetes mellitus  See trends below. Patient is prescribed Lantus 15 units every morning and Ozempic weekly at baseline. He has not been compliant with this regimen for months. He states he believes that insulin is poison, that he was told this by someone. Most recent A1c in May was 9.1%.  A1c checked and is 8.8% here.  Patient was initially refusing Lantus here. After some education and discussion, patient was willing to try taking the Lantus here.   -Lantus increased from 20 to 23 units daily- may be able to decrease after discharge with resumption of Ozempic   -Sliding scale insulin increased to high insulin resistance  -Add Novolog 3 units scheduled with meals  -Pharmacy liaison consult for Jardiance coverage  -POCT BG monitoring  -Hypoglycemia protocol     Objective:  BP (!) 139/95 (BP Location: Right arm, Patient Position: Sitting, Cuff Size: Adult Large)   Pulse 105   Temp 98.5  F (36.9  C) (Temporal)   Resp 16   Ht 1.676 m (5' 6\")   Wt 109.5 kg (241 lb 4.8 oz)   SpO2 94%   BMI 38.95 kg/m      Vitals signs reviewed and noted    GENERAL: Alert and oriented x3      Pertinent labs and procedures were reviewed     Subjective:     Pt on phone in hallway. When pt finished phone call, he was visibly upset and refused to speak with writer. Spoke with pt RN " for hx of blood in toilet. RN will reach out if patient would like to speak with provider.     BROOKLYN Bailey CNP  Internal Medicine [facility] Hospitalist  Page job code [id number] (3B), [phone] (3A), or [id number] ([facility] and 4A)  Text paging via [facility] is appreciated  2023    Medical Decision Makin MINUTES SPENT BY ME on the date of service doing chart review, history, exam, documentation & further activities per the note.

## 2023-08-25 NOTE — PROGRESS NOTES
Pt remained to be on SIO 1:1. She remained Safe. She remained safe. Will continue to monitor patient.

## 2023-08-25 NOTE — PROGRESS NOTES
Patient voiced having a headache rated 6/10. He requested for Excedrin. Writer educated him that the caffeine could potentially keep him awake. Patient stated that he was easily able to fall back asleep when receiving Excedrin yesterday at 0625. He was provided with Excedrin for his headache. Will continue to monitor his pain.

## 2023-08-25 NOTE — PROGRESS NOTES
08/25/23 2193   Group Therapy Session   Group Attendance attended group session   Total Time (minutes) 135   Total # Attendees 2,2,2   Group Type psychoeducation;task skill   Group Topic Covered coping skills/lifestyle management;problem-solving;emotions/expression;structured socialization   Group Session Detail topic group, OT clinic, coping skills   Patient Response/Contribution cooperative with task;expressed understanding of topic;discussed personal experience with topic;organized     Topic Group ([id number])  Pt initially stated he was struggling, having a hard morning, felt safe in group, though didn't necessarily want to discuss what was causing him to feel this way.  Topic involved having visual cues to promote use coping skills and positive thinking, which pt stated was helpful, and something that a psych associate on the unit started working with him on to have in his room.  Pt selected a variety of color printed handouts on things like cognitive distortions, breathing techniques, positive thinking, growth mindset, and healing beliefs.  Spent time discussing why he found these things helpful/meaningful.    OT clinic: (11:20-12:10)  With writers assistance, pt spent time laminating posters/handouts from previous group to hang on his room wall.  Pt then wanted to make bookmarks to also laminate.  While working, mood appeared to improve as he was social, and easily engaged in activity.    Coping skills group (1:30-2:15)  Pt was offered opportunity to engage in leisure activity with writer and peer, though he reported continued work on his bookmark activity would be beneficial and meaningful to him.  At the end of group, pt asked for help hanging posters in his room, though was given tape and gently reminded he could do this independently.  Pt asked for recommendations where things should go, but again, was encouraged to make his own decisions, and he did so.

## 2023-08-25 NOTE — PLAN OF CARE
Goal Outcome Evaluation: Referral Follow Up    Care Coordination Note    VAL's for [facility] IOP Programming and sent referral (8/24) for IOP/DB .    [facility]   [address] [place] [id number]  P: [phone] F: [phone]    Status: Referral received. Transferred to CD/ : Mary Crooks. Left Mary OCHOA    -Lucie Fagan  Adult Behavioral Health Care Coordinator

## 2023-08-25 NOTE — PLAN OF CARE
"  Problem: Suicidal Behavior  Goal: Suicidal Behavior is Absent or Managed  Outcome: Not Progressing   Goal Outcome Evaluation:    Upon arrival of shift pt was asleep until 16:45. After waking up he asked if he can have activities to do for the evening , writer gave several coloring pages from a book. Pt ate dinner and is actively present in milieu. Pt per check in stated \"I am nervous about staying safe since I have came of the SIO, I think I need to get back on it\"  Pt is not sena for safety per check in. Pt is having intrusive SI thoughts with auditory hallucinations. No SIB observed. Pt stated he has no intent to harm others. Pt is anxious rating 8/10. Denies depression. Pt mood is flat but cooperative. Pt stated his auditory hallucination get worse during bed time, voices tell him to hurt himself and voices give him thoughts of suicidal ideation.     Pt took a shower and stated he was feeling better after the shower but soon after he went to his room and started to listen to music; pt reached out to writer about not feeling safe with the radio, writer than took the radio out of the room and pt stated that he is not feeling safe at all and requested SIO staff. Writer consulted with doctor and pt was placed on an SIO soon after. Pt complained about blood in urine, and absent bowel movement for the past 4 days. Internal med was contacted and X-ray and urine sample was ordered by internal med. Internal med stated consult can happen tomorrow morning but the tests can be done today.  Internal medicine ordered new medications/prn for the bowel movement problem, Docusate sodium 100mg was given at 2050. PRN Ketones and Glucose is present in the urine sample that was collected from pt. X-ray has yet to come for the test. Pt hands are sore but no pain; from the wall punch on 8/23 day shift. No other medical concerns noted.     Pt request: Pt will like to be woken up at 2am for his blood sugar tests.       Zyprexa " "10mg given at 1949 for agitation    BP (!) 143/90 (BP Location: Left arm)   Pulse 101   Temp 98.3  F (36.8  C) (Oral)   Resp 18   Ht 1.676 m (5' 6\")   Wt 109.5 kg (241 lb 4.8 oz)   SpO2 95%   BMI 38.95 kg/m         "

## 2023-08-26 LAB
C TRACH DNA SPEC QL PROBE+SIG AMP: NEGATIVE
CLUE CELLS: PRESENT
GLUCOSE BLDC GLUCOMTR-MCNC: 185 MG/DL (ref 70–99)
GLUCOSE BLDC GLUCOMTR-MCNC: 211 MG/DL (ref 70–99)
GLUCOSE BLDC GLUCOMTR-MCNC: 222 MG/DL (ref 70–99)
GLUCOSE BLDC GLUCOMTR-MCNC: 264 MG/DL (ref 70–99)
GLUCOSE BLDC GLUCOMTR-MCNC: 313 MG/DL (ref 70–99)
N GONORRHOEA DNA SPEC QL NAA+PROBE: NEGATIVE
T PALLIDUM AB SER QL: NONREACTIVE
TRICHOMONAS, WET PREP: ABNORMAL
WBC'S/HIGH POWER FIELD, WET PREP: ABNORMAL
YEAST, WET PREP: ABNORMAL

## 2023-08-26 PROCEDURE — 250N000013 HC RX MED GY IP 250 OP 250 PS 637: Performed by: PSYCHIATRY & NEUROLOGY

## 2023-08-26 PROCEDURE — 250N000013 HC RX MED GY IP 250 OP 250 PS 637: Performed by: STUDENT IN AN ORGANIZED HEALTH CARE EDUCATION/TRAINING PROGRAM

## 2023-08-26 PROCEDURE — 99232 SBSQ HOSP IP/OBS MODERATE 35: CPT

## 2023-08-26 PROCEDURE — 250N000013 HC RX MED GY IP 250 OP 250 PS 637: Performed by: INTERNAL MEDICINE

## 2023-08-26 PROCEDURE — G0177 OPPS/PHP; TRAIN & EDUC SERV: HCPCS

## 2023-08-26 PROCEDURE — 250N000013 HC RX MED GY IP 250 OP 250 PS 637

## 2023-08-26 PROCEDURE — 250N000013 HC RX MED GY IP 250 OP 250 PS 637: Performed by: PHYSICIAN ASSISTANT

## 2023-08-26 PROCEDURE — 87210 SMEAR WET MOUNT SALINE/INK: CPT

## 2023-08-26 PROCEDURE — 250N000013 HC RX MED GY IP 250 OP 250 PS 637: Performed by: FAMILY MEDICINE

## 2023-08-26 PROCEDURE — 124N000002 HC R&B MH UMMC

## 2023-08-26 RX ADMIN — NICOTINE POLACRILEX 4 MG: 4 GUM, CHEWING BUCCAL at 10:19

## 2023-08-26 RX ADMIN — DOXYCYCLINE HYCLATE 100 MG: 50 CAPSULE ORAL at 19:04

## 2023-08-26 RX ADMIN — NICOTINE POLACRILEX 4 MG: 4 GUM, CHEWING BUCCAL at 20:15

## 2023-08-26 RX ADMIN — Medication 10 MG: at 19:05

## 2023-08-26 RX ADMIN — POLYETHYLENE GLYCOL 3350 17 G: 17 POWDER, FOR SOLUTION ORAL at 08:15

## 2023-08-26 RX ADMIN — NICOTINE POLACRILEX 4 MG: 4 GUM, CHEWING BUCCAL at 08:04

## 2023-08-26 RX ADMIN — NICOTINE POLACRILEX 4 MG: 4 GUM, CHEWING BUCCAL at 14:24

## 2023-08-26 RX ADMIN — TRIFLUOPERAZINE HYDROCHLORIDE 2 MG: 2 TABLET, FILM COATED ORAL at 19:05

## 2023-08-26 RX ADMIN — ACETAMINOPHEN 650 MG: 325 TABLET, FILM COATED ORAL at 18:02

## 2023-08-26 RX ADMIN — AMLODIPINE BESYLATE 10 MG: 10 TABLET ORAL at 08:02

## 2023-08-26 RX ADMIN — GABAPENTIN 1200 MG: 600 TABLET, FILM COATED ORAL at 19:06

## 2023-08-26 RX ADMIN — Medication 15 ML: at 10:19

## 2023-08-26 RX ADMIN — ACETAMINOPHEN 650 MG: 325 TABLET, FILM COATED ORAL at 09:23

## 2023-08-26 RX ADMIN — DOCUSATE SODIUM 100 MG: 100 CAPSULE, LIQUID FILLED ORAL at 19:05

## 2023-08-26 RX ADMIN — EMPAGLIFLOZIN 10 MG: 10 TABLET, FILM COATED ORAL at 10:24

## 2023-08-26 RX ADMIN — NICOTINE POLACRILEX 4 MG: 4 GUM, CHEWING BUCCAL at 13:15

## 2023-08-26 RX ADMIN — DOCUSATE SODIUM 100 MG: 100 CAPSULE, LIQUID FILLED ORAL at 08:02

## 2023-08-26 RX ADMIN — LITHIUM CARBONATE 900 MG: 450 TABLET, EXTENDED RELEASE ORAL at 19:05

## 2023-08-26 RX ADMIN — OLANZAPINE 5 MG: 5 TABLET, ORALLY DISINTEGRATING ORAL at 18:02

## 2023-08-26 RX ADMIN — NICOTINE POLACRILEX 4 MG: 4 GUM, CHEWING BUCCAL at 21:31

## 2023-08-26 RX ADMIN — PANTOPRAZOLE SODIUM 40 MG: 40 TABLET, DELAYED RELEASE ORAL at 08:02

## 2023-08-26 RX ADMIN — TRIFLUOPERAZINE HYDROCHLORIDE 2 MG: 2 TABLET, FILM COATED ORAL at 08:02

## 2023-08-26 RX ADMIN — GABAPENTIN 1200 MG: 600 TABLET, FILM COATED ORAL at 08:02

## 2023-08-26 RX ADMIN — BUSPIRONE HYDROCHLORIDE 15 MG: 15 TABLET ORAL at 08:02

## 2023-08-26 RX ADMIN — NICOTINE POLACRILEX 4 MG: 4 GUM, CHEWING BUCCAL at 09:24

## 2023-08-26 RX ADMIN — QUETIAPINE FUMARATE 500 MG: 400 TABLET ORAL at 19:05

## 2023-08-26 RX ADMIN — GABAPENTIN 1200 MG: 600 TABLET, FILM COATED ORAL at 13:15

## 2023-08-26 RX ADMIN — INSULIN GLARGINE 23 UNITS: 100 INJECTION, SOLUTION SUBCUTANEOUS at 07:45

## 2023-08-26 RX ADMIN — BUSPIRONE HYDROCHLORIDE 15 MG: 15 TABLET ORAL at 19:04

## 2023-08-26 RX ADMIN — NICOTINE POLACRILEX 4 MG: 4 GUM, CHEWING BUCCAL at 18:03

## 2023-08-26 RX ADMIN — TESTOSTERONE 20.25 MG: 20.25 GEL TOPICAL at 08:01

## 2023-08-26 RX ADMIN — BUSPIRONE HYDROCHLORIDE 15 MG: 15 TABLET ORAL at 13:15

## 2023-08-26 RX ADMIN — DOXYCYCLINE HYCLATE 100 MG: 50 CAPSULE ORAL at 08:01

## 2023-08-26 RX ADMIN — CLONAZEPAM 0.5 MG: 0.5 TABLET ORAL at 10:35

## 2023-08-26 RX ADMIN — NICOTINE POLACRILEX 4 MG: 4 GUM, CHEWING BUCCAL at 12:22

## 2023-08-26 RX ADMIN — NICOTINE POLACRILEX 4 MG: 4 GUM, CHEWING BUCCAL at 19:06

## 2023-08-26 RX ADMIN — OLANZAPINE 5 MG: 5 TABLET, ORALLY DISINTEGRATING ORAL at 21:31

## 2023-08-26 RX ADMIN — ACETAMINOPHEN, ASPIRIN, CAFFEINE 1 TABLET: 250; 65; 250 TABLET, FILM COATED ORAL at 02:56

## 2023-08-26 RX ADMIN — NICOTINE 1 PATCH: 21 PATCH, EXTENDED RELEASE TRANSDERMAL at 08:02

## 2023-08-26 RX ADMIN — OLANZAPINE 5 MG: 5 TABLET, ORALLY DISINTEGRATING ORAL at 18:51

## 2023-08-26 ASSESSMENT — ACTIVITIES OF DAILY LIVING (ADL)
ADLS_ACUITY_SCORE: 42
ORAL_HYGIENE: INDEPENDENT
ADLS_ACUITY_SCORE: 42
DRESS: SCRUBS (BEHAVIORAL HEALTH)
ADLS_ACUITY_SCORE: 42
HYGIENE/GROOMING: INDEPENDENT

## 2023-08-26 NOTE — PLAN OF CARE
"  Problem: Adult Behavioral Health Plan of Care  Goal: Adheres to Safety Considerations for Self and Others  Outcome: Not Progressing  IPlan of Care Reviewed With: patient      Patient continues to endorse having thoughts of wanting to harm himself, but denies thoughts wanting to harm others. He states \" I am not  going to contract for safety today\". He has been coming to the nursing station frequently. He was complaining of a headache was given Tylenol to help relieve his pain, and was given PRN Klonopin to help with anxiety .    Patient's blood sugar pre-breakfast was 264 and pre-lunch was 185. He was given long acting Insulin coverage and short acting Insulin coverage during this shift.    Patient's wet preparation and vaginal swab has been completed and waiting for results patient has been complaining of being itchy and having sore in his private parts . Patient continues to be on SIO for self injures behaviors and for thoughts of wanting to harm himself. He was encouraged to take a shower but he states that he will take a shower sometime in the evening. Patient reported his Visitor who came to the unit yesterday called him and reported that he is COVID positive, patient was concerned regarding his exposure to his friend. He was encouraged to wear mask and that he will be tested for Covid is there is any symptoms. No behavioral outburst during this shift except patient constantly seeks for staff during this shift otherwise he has been sitting in the OT room and coloring and working on worksheets for coping mechanisms.            "

## 2023-08-26 NOTE — CARE PLAN
"Occupational Therapy Group Note:     08/26/23 1607   Group Therapy Session   Group Attendance attended group session   Time Session Began 1115   Time Session Ended 1200   Total Time (minutes) 45   Total # Attendees 3   Group Type task skill   Group Topic Covered coping skills/lifestyle management;relaxation techniques;emotions/expression   Group Session Detail OT Activity Group   Patient Response/Contribution confronted peers appropriately;cooperative with task   Patient Participation Detail Patient was sitting with SIO staff in OT group room upon writer's approach. Patient was initially the only participant in group setting. Patient was working on artistic bookmark task and appeared invested and motivated by this activity. Patient told writer that they were having a bad morning and was feeling stressed which provoked a \"panic attack.\" Patient confirmed feeling better and more calm engaging in this artistic activity. Writer and patient discussed importance of identifying positive coping techniques/activities to help deal with distressing feelings. Patient reported interest in continuing work on artistic bookmarks whilst listening to calming instrumental music to help alleviate distressing feelings this date. Patient worked in an calm and appropriate manner. Patient was pleasant in interactions with writer. Remained in group setting for full duration.        "

## 2023-08-26 NOTE — PLAN OF CARE
Problem: Sleep Disturbance  Goal: Adequate Sleep/Rest  Outcome: Progressing   Goal Outcome Evaluation:    Prakash appeared to sleep a total of 6.5 hours this night shift.  Up once requesting Excedrin Migraine which he was given.  BG = 222 at slightly after 0200.  BG at HS last night = 377.

## 2023-08-26 NOTE — PLAN OF CARE
"Pt making frequent requests. Social with staff. Cooperative. Speech is organized. Pt endorsing passive SI and auditory hallucinations to harm self. No noted SIB. When assessed for depression, pt stated that he was feeling mild-to-moderate \"hopelessness.\"     Pt had an IM consult for hematuria in the AM. Pt was not cooperative when met with for this consult. Pt later came to writer stating that pt thinks that his vagina has a sore and that he has been bleeding from his vagina because of this. Contacted IM to update with IM stating that they will met tomorrow morning. Pt has order for wet preparation. Unable to obtain this shift.     Pt came to writer and stated that he was having anxiety, stated to writer that he wanted to take a medication to lower anxiety; given 50 mg hydroxyzine. Pt indicated that medication was minimally effective. Pt requested PRN Zyprexa for apparent agitation, which was given. Given PRN Tylenol for headache.     Dinner B. HS blood sugar of 374, noted to be very high. After HS insulin correction, BG reading of 351. Contact IM due to persistently high reading. IM order one-time dose of 5 units of insulin given. Upon subsequent recheck, .    No acute behavioral outburst. Maintains 1:1 for self harm.      Problem: Suicide Risk  Goal: Absence of Self-Harm  2023 by Juan Tesfaye, RN  Outcome: Progressing    /88 (BP Location: Right arm)   Pulse 101   Temp 97.7  F (36.5  C) (Oral)   Resp 16   Ht 1.676 m (5' 6\")   Wt 109.5 kg (241 lb 4.8 oz)   SpO2 95%   BMI 38.95 kg/m      "

## 2023-08-26 NOTE — PROGRESS NOTES
"Internal Medicine Progress Note      Assessment and plan:  Ayana \"Prakash\" VINCENT Prasad is a 33 year old adult with a history of  DM2, GERD, HTN, ADHD, bipolar, borderline personality. Following for DM2, saw patient today for vaginal lesion.     #Vaginal lesion  Pt declined vaginal exam. States lesion is on the right side of vaginal area. States it is painful and bleeds. Denies any malodorous discharge. Denies hx of STIs and/or unsafe sexual activity. Has a history of oral herpes.   -Chlamydia and gonorrhea pending  -Syphilis pending  -Herpes simplex Type 1 and 2 pending  -Wet prep pending  -Clean area with warm water and mild soap, pat dry  -Notify medicine with changes or worsening symptoms of lesion    #Type 2 diabetes mellitus  See trends below. Patient is prescribed Lantus 15 units every morning and Ozempic weekly at baseline. He has not been compliant with this regimen for months. He states he believes that insulin is poison, that he was told this by someone. Most recent A1c in May was 9.1%.  A1c checked and is 8.8% here.  Patient was initially refusing Lantus here. After some education and discussion, patient was willing to try taking the Lantus here.   -Lantus increased from 20 to 23 units daily- may be able to decrease after discharge with resumption of Ozempic   -Sliding scale insulin increased to high insulin resistance  -Add Novolog 3 units scheduled with meals  -Start Jardiance 10 mg daily (pharmacy liaison consulted and covered under pt insurance)  -POCT BG monitoring  -Hypoglycemia protocol   Recent Labs   Lab 08/26/23  0742 08/26/23  0218 08/25/23  2254 08/25/23  2147 08/25/23  2111 08/25/23  1715   * 222* 275* 351* 374* 247*         Objective:  /88 (BP Location: Right arm)   Pulse 101   Temp 97.7  F (36.5  C) (Oral)   Resp 16   Ht 1.676 m (5' 6\")   Wt 109.5 kg (241 lb 4.8 oz)   SpO2 95%   BMI 38.95 kg/m      Vitals signs reviewed and noted    GENERAL: Alert and oriented x3  SKIN: No " jaundice, no rash. Red, painful lesion with serous drainage vaginal area (per pt report)    Pertinent labs and procedures were reviewed     Subjective:     Declined vaginal exam. Painful lesion in vaginal area. Denies recent sexual activity. No other concerns or questions at this time.     BROOKLYN Bailey CNP  Internal Medicine [facility] Hospitalist  Page job code [id number] (3B), [id number] (3A), or [id number] ([facility] and )  Text paging via [employer] is appreciated  2023    Medical Decision Makin MINUTES SPENT BY ME on the date of service doing chart review, history, exam, documentation & further activities per the note.

## 2023-08-27 LAB
GLUCOSE BLDC GLUCOMTR-MCNC: 162 MG/DL (ref 70–99)
GLUCOSE BLDC GLUCOMTR-MCNC: 174 MG/DL (ref 70–99)
GLUCOSE BLDC GLUCOMTR-MCNC: 187 MG/DL (ref 70–99)
GLUCOSE BLDC GLUCOMTR-MCNC: 245 MG/DL (ref 70–99)
GLUCOSE BLDC GLUCOMTR-MCNC: 249 MG/DL (ref 70–99)

## 2023-08-27 PROCEDURE — 250N000013 HC RX MED GY IP 250 OP 250 PS 637: Performed by: STUDENT IN AN ORGANIZED HEALTH CARE EDUCATION/TRAINING PROGRAM

## 2023-08-27 PROCEDURE — 250N000013 HC RX MED GY IP 250 OP 250 PS 637: Performed by: PSYCHIATRY & NEUROLOGY

## 2023-08-27 PROCEDURE — 250N000013 HC RX MED GY IP 250 OP 250 PS 637: Performed by: EMERGENCY MEDICINE

## 2023-08-27 PROCEDURE — 250N000013 HC RX MED GY IP 250 OP 250 PS 637: Performed by: INTERNAL MEDICINE

## 2023-08-27 PROCEDURE — 99232 SBSQ HOSP IP/OBS MODERATE 35: CPT

## 2023-08-27 PROCEDURE — 250N000013 HC RX MED GY IP 250 OP 250 PS 637: Performed by: FAMILY MEDICINE

## 2023-08-27 PROCEDURE — 250N000013 HC RX MED GY IP 250 OP 250 PS 637

## 2023-08-27 PROCEDURE — 124N000002 HC R&B MH UMMC

## 2023-08-27 PROCEDURE — 250N000013 HC RX MED GY IP 250 OP 250 PS 637: Performed by: PHYSICIAN ASSISTANT

## 2023-08-27 PROCEDURE — H2032 ACTIVITY THERAPY, PER 15 MIN: HCPCS

## 2023-08-27 RX ORDER — METRONIDAZOLE 500 MG/1
500 TABLET ORAL 2 TIMES DAILY
Status: DISCONTINUED | OUTPATIENT
Start: 2023-08-27 | End: 2023-08-28 | Stop reason: HOSPADM

## 2023-08-27 RX ADMIN — NICOTINE 1 PATCH: 21 PATCH, EXTENDED RELEASE TRANSDERMAL at 07:52

## 2023-08-27 RX ADMIN — TRIFLUOPERAZINE HYDROCHLORIDE 2 MG: 2 TABLET, FILM COATED ORAL at 19:10

## 2023-08-27 RX ADMIN — GABAPENTIN 1200 MG: 600 TABLET, FILM COATED ORAL at 19:09

## 2023-08-27 RX ADMIN — DOXYCYCLINE HYCLATE 100 MG: 50 CAPSULE ORAL at 07:51

## 2023-08-27 RX ADMIN — NICOTINE POLACRILEX 4 MG: 4 GUM, CHEWING BUCCAL at 19:25

## 2023-08-27 RX ADMIN — ACETAMINOPHEN 650 MG: 325 TABLET, FILM COATED ORAL at 18:45

## 2023-08-27 RX ADMIN — HYDROXYZINE HYDROCHLORIDE 50 MG: 50 TABLET, FILM COATED ORAL at 00:46

## 2023-08-27 RX ADMIN — TRIFLUOPERAZINE HYDROCHLORIDE 2 MG: 2 TABLET, FILM COATED ORAL at 07:51

## 2023-08-27 RX ADMIN — POLYETHYLENE GLYCOL 3350 17 G: 17 POWDER, FOR SOLUTION ORAL at 07:51

## 2023-08-27 RX ADMIN — PANTOPRAZOLE SODIUM 40 MG: 40 TABLET, DELAYED RELEASE ORAL at 06:47

## 2023-08-27 RX ADMIN — EMPAGLIFLOZIN 10 MG: 10 TABLET, FILM COATED ORAL at 07:51

## 2023-08-27 RX ADMIN — NICOTINE POLACRILEX 4 MG: 4 GUM, CHEWING BUCCAL at 18:04

## 2023-08-27 RX ADMIN — DOCUSATE SODIUM 100 MG: 100 CAPSULE, LIQUID FILLED ORAL at 07:50

## 2023-08-27 RX ADMIN — CLONAZEPAM 0.5 MG: 0.5 TABLET ORAL at 09:37

## 2023-08-27 RX ADMIN — BUSPIRONE HYDROCHLORIDE 15 MG: 15 TABLET ORAL at 07:50

## 2023-08-27 RX ADMIN — NICOTINE POLACRILEX 4 MG: 4 GUM, CHEWING BUCCAL at 14:19

## 2023-08-27 RX ADMIN — OLANZAPINE 10 MG: 5 TABLET, ORALLY DISINTEGRATING ORAL at 19:36

## 2023-08-27 RX ADMIN — BUSPIRONE HYDROCHLORIDE 15 MG: 15 TABLET ORAL at 19:10

## 2023-08-27 RX ADMIN — BUSPIRONE HYDROCHLORIDE 15 MG: 15 TABLET ORAL at 14:15

## 2023-08-27 RX ADMIN — AMLODIPINE BESYLATE 10 MG: 10 TABLET ORAL at 07:51

## 2023-08-27 RX ADMIN — NICOTINE POLACRILEX 4 MG: 4 GUM, CHEWING BUCCAL at 12:32

## 2023-08-27 RX ADMIN — DOXYCYCLINE HYCLATE 100 MG: 50 CAPSULE ORAL at 19:10

## 2023-08-27 RX ADMIN — Medication 10 MG: at 19:09

## 2023-08-27 RX ADMIN — LITHIUM CARBONATE 900 MG: 450 TABLET, EXTENDED RELEASE ORAL at 19:10

## 2023-08-27 RX ADMIN — NICOTINE POLACRILEX 4 MG: 4 GUM, CHEWING BUCCAL at 15:29

## 2023-08-27 RX ADMIN — QUETIAPINE FUMARATE 500 MG: 400 TABLET ORAL at 19:09

## 2023-08-27 RX ADMIN — NICOTINE POLACRILEX 4 MG: 4 GUM, CHEWING BUCCAL at 16:49

## 2023-08-27 RX ADMIN — NICOTINE POLACRILEX 4 MG: 4 GUM, CHEWING BUCCAL at 07:50

## 2023-08-27 RX ADMIN — ACETAMINOPHEN, ASPIRIN, CAFFEINE 1 TABLET: 250; 65; 250 TABLET, FILM COATED ORAL at 03:26

## 2023-08-27 RX ADMIN — ACETAMINOPHEN 650 MG: 325 TABLET, FILM COATED ORAL at 14:43

## 2023-08-27 RX ADMIN — GABAPENTIN 1200 MG: 600 TABLET, FILM COATED ORAL at 14:15

## 2023-08-27 RX ADMIN — DOCUSATE SODIUM 100 MG: 100 CAPSULE, LIQUID FILLED ORAL at 19:10

## 2023-08-27 RX ADMIN — NICOTINE POLACRILEX 4 MG: 4 GUM, CHEWING BUCCAL at 11:23

## 2023-08-27 RX ADMIN — METRONIDAZOLE 500 MG: 500 TABLET ORAL at 10:25

## 2023-08-27 RX ADMIN — METRONIDAZOLE 500 MG: 500 TABLET ORAL at 19:10

## 2023-08-27 RX ADMIN — NICOTINE POLACRILEX 4 MG: 4 GUM, CHEWING BUCCAL at 09:09

## 2023-08-27 RX ADMIN — INSULIN GLARGINE 23 UNITS: 100 INJECTION, SOLUTION SUBCUTANEOUS at 08:23

## 2023-08-27 RX ADMIN — TESTOSTERONE 20.25 MG: 20.25 GEL TOPICAL at 07:50

## 2023-08-27 RX ADMIN — GABAPENTIN 1200 MG: 600 TABLET, FILM COATED ORAL at 07:50

## 2023-08-27 RX ADMIN — NICOTINE POLACRILEX 4 MG: 4 GUM, CHEWING BUCCAL at 06:47

## 2023-08-27 RX ADMIN — ACETAMINOPHEN 650 MG: 325 TABLET, FILM COATED ORAL at 07:54

## 2023-08-27 ASSESSMENT — ACTIVITIES OF DAILY LIVING (ADL)
ADLS_ACUITY_SCORE: 42
LAUNDRY: WITH SUPERVISION
ORAL_HYGIENE: INDEPENDENT
ADLS_ACUITY_SCORE: 42
DRESS: SCRUBS (BEHAVIORAL HEALTH)
DRESS: INDEPENDENT
ADLS_ACUITY_SCORE: 42
HYGIENE/GROOMING: INDEPENDENT
HYGIENE/GROOMING: INDEPENDENT
ADLS_ACUITY_SCORE: 42
ORAL_HYGIENE: INDEPENDENT

## 2023-08-27 NOTE — PLAN OF CARE
"Pt endorses SI, stating that he has command hallucinations to hurt self; denies having a plan. Repeatedly endorsed desire to hurt self to writer. No SIB observed or reported. Continues to be on SIO 1:1 for self injury risk. Endorsed moderate anxiety and mild depression.  Blunted affect. Cooperative. Visible in milieu. Coloring. Social with staff. Adequate fluid and food intake.     Pt reported to writer that he was feeling strong SI and agitation. Pt asked for IM zyprexa. Informed pt that pt only had PO zyprexa, to which pt stated \"if I code then can I get IM zyprexa?\". Pt subsequently took Zyprexa with pt reporting the medication to be effective.     B, 162. Pt stated that he had a headache. Given PRN Tylenol. PRN nicotine. Took scheduled medication without issue.     No acute behavioral outburst noted this shift.     /84 (BP Location: Left arm)   Pulse 103   Temp 97.7  F (36.5  C) (Temporal)   Resp 16   Ht 1.676 m (5' 6\")   Wt 109.5 kg (241 lb 4.8 oz)   SpO2 99%   BMI 38.95 kg/m        "

## 2023-08-27 NOTE — PROGRESS NOTES
"Internal Medicine Progress Note      Assessment and plan:  Ayana \"Prakash\" VINCENT Prasad is a 33 year old adult with a history of  DM2, GERD, HTN, ADHD, bipolar, borderline personality. Following for DM2, saw patient today for vaginal lesion.     #Vaginal lesion  #Bacterial vaginosis  Pt declined vaginal exam. States lesion is on the right side of vaginal area. States it is painful and bleeds. Denies any malodorous discharge. Denies hx of STIs and/or unsafe sexual activity. Has a history of oral herpes. Chlamydia, gonorrhea, syphilis testing negative. Wet prep + clue cells.   -Metronidazole 500 mg BID for 7 days  -Herpes simplex Type 1 and 2 pending  -Clean area with warm water and mild soap, pat dry  -Notify medicine with changes or worsening symptoms of lesion    #Type 2 diabetes mellitus  See trends below. Patient is prescribed Lantus 15 units every morning and Ozempic weekly at baseline. He has not been compliant with this regimen for months. He states he believes that insulin is poison, that he was told this by someone. Most recent A1c in May was 9.1%.  A1c checked and is 8.8% here.  Patient was initially refusing Lantus here. After some education and discussion, patient was willing to try taking the Lantus here.   -Lantus increased from 20 to 23 units daily- may be able to decrease after discharge with resumption of Ozempic   -Sliding scale insulin increased to high insulin resistance  -Increase Novolog to 4 units scheduled with meals  -Jardiance 10 mg daily (pharmacy liaison consulted and covered under pt insurance)  -POCT BG monitoring  -Hypoglycemia protocol   Recent Labs   Lab 08/27/23  0743 08/27/23  0042 08/26/23  2124 08/26/23  1728 08/26/23  1153 08/26/23  0742   * 249* 313* 211* 185* 264*           Objective:  /88 (BP Location: Left arm, Patient Position: Sitting, Cuff Size: Adult Regular)   Pulse 100   Temp 97.9  F (36.6  C) (Temporal)   Resp 16   Ht 1.676 m (5' 6\")   Wt 109.5 kg (241 lb " 4.8 oz)   SpO2 100%   BMI 38.95 kg/m      Vitals signs reviewed and noted    GENERAL: Alert and oriented x3  SKIN: No jaundice, no rash. Red, painful lesion with serous drainage vaginal area (per pt report)    Pertinent labs and procedures were reviewed     Subjective:     Discussed initiation of Flagyl. Reviewed negative tests so far. No changes in lesion. Herpes simplex pending.     BROOKLYN Bailey CNP  Internal Medicine [facility] Hospitalist  Page job code [id number] (), [id number] (3A), or [phone] ([facility] and 4A)  Text paging via [employer] is appreciated  2023    Medical Decision Makin MINUTES SPENT BY ME on the date of service doing chart review, history, exam, documentation & further activities per the note.

## 2023-08-27 NOTE — PLAN OF CARE
Problem: Sleep Disturbance  Goal: Adequate Sleep/Rest  Outcome: Not Progressing   Goal Outcome Evaluation:    Prakash appeared to sleep a total of 4.75 hours this night shift.  BG at HS = 313 and at 0100 = 248. Vistaril and Excedrin (HA)both given a prns per patient request.  Calm and cooperative.

## 2023-08-27 NOTE — PLAN OF CARE
"  Problem: Adult Behavioral Health Plan of Care  Goal: Optimized Coping Skills in Response to Life Stressors  Outcome: Not Progressing     Patient calm and cooperative. Mood is flat and depressed. Isolates to self on the unit. Out in the lounge area watching TV or coloring. Likes to also color in the OT room. Requested  prn Klonopin 0.5 mg for anxiety 2/10 and depression 7/10. Endorses still having suicidal thoughts stated \"the voices are telling me bad things but the voices are getting better due to the increase in the Seroquel.\" Patient would not elaborate on what the voices are telling the patient. Denied SIB/HI. No suicidal plan just thoughts. Contracted for safety. Denies have visual hallucinations. Endorses a headache 7/10 medicated with tylenol 650 mg x 2 with minimal relief. He continues to have vaginal lesion pain. Patient refused his 2 O'clock medications but came to the medication room and took the medications. Medication compliant. Adequate fluid and food intake. Started on Metaconazole for positive wet prep.     Breakfast blood sugar was 245 and lunch blood sugar was 174. Insulin coverage was administrated per protocol. Ate 100% of breakfast and lunch.    BP (!) 139/91 (BP Location: Right arm, Patient Position: Sitting, Cuff Size: Adult Regular)   Pulse 102   Temp 97.5  F (36.4  C) (Oral)   Resp 16   Ht 1.676 m (5' 6\")   Wt 109.5 kg (241 lb 4.8 oz)   SpO2 100%   BMI 38.95 kg/m        Prn nicotine gum provided.   "

## 2023-08-28 ENCOUNTER — DOCUMENTATION ONLY (OUTPATIENT)
Dept: BEHAVIORAL HEALTH | Facility: CLINIC | Age: 33
End: 2023-08-28

## 2023-08-28 VITALS
DIASTOLIC BLOOD PRESSURE: 90 MMHG | OXYGEN SATURATION: 95 % | HEART RATE: 99 BPM | SYSTOLIC BLOOD PRESSURE: 138 MMHG | RESPIRATION RATE: 16 BRPM | BODY MASS INDEX: 38.78 KG/M2 | TEMPERATURE: 97.5 F | HEIGHT: 66 IN | WEIGHT: 241.3 LBS

## 2023-08-28 LAB
GLUCOSE BLDC GLUCOMTR-MCNC: 142 MG/DL (ref 70–99)
GLUCOSE BLDC GLUCOMTR-MCNC: 178 MG/DL (ref 70–99)
GLUCOSE BLDC GLUCOMTR-MCNC: 187 MG/DL (ref 70–99)
HSV1 IGG SERPL QL IA: 16.9 INDEX
HSV1 IGG SERPL QL IA: ABNORMAL
HSV2 IGG SERPL QL IA: 1.99 INDEX
HSV2 IGG SERPL QL IA: ABNORMAL
LITHIUM SERPL-SCNC: 0.4 MMOL/L (ref 0.6–1.2)

## 2023-08-28 PROCEDURE — 250N000013 HC RX MED GY IP 250 OP 250 PS 637: Performed by: PSYCHIATRY & NEUROLOGY

## 2023-08-28 PROCEDURE — 99239 HOSP IP/OBS DSCHRG MGMT >30: CPT | Performed by: PSYCHIATRY & NEUROLOGY

## 2023-08-28 PROCEDURE — 250N000012 HC RX MED GY IP 250 OP 636 PS 637

## 2023-08-28 PROCEDURE — 250N000013 HC RX MED GY IP 250 OP 250 PS 637: Performed by: PHYSICIAN ASSISTANT

## 2023-08-28 PROCEDURE — 80178 ASSAY OF LITHIUM: CPT | Performed by: STUDENT IN AN ORGANIZED HEALTH CARE EDUCATION/TRAINING PROGRAM

## 2023-08-28 PROCEDURE — 250N000013 HC RX MED GY IP 250 OP 250 PS 637

## 2023-08-28 PROCEDURE — 93005 ELECTROCARDIOGRAM TRACING: CPT

## 2023-08-28 PROCEDURE — 36415 COLL VENOUS BLD VENIPUNCTURE: CPT | Performed by: STUDENT IN AN ORGANIZED HEALTH CARE EDUCATION/TRAINING PROGRAM

## 2023-08-28 PROCEDURE — 250N000013 HC RX MED GY IP 250 OP 250 PS 637: Performed by: INTERNAL MEDICINE

## 2023-08-28 PROCEDURE — 250N000013 HC RX MED GY IP 250 OP 250 PS 637: Performed by: FAMILY MEDICINE

## 2023-08-28 PROCEDURE — 93010 ELECTROCARDIOGRAM REPORT: CPT | Performed by: INTERNAL MEDICINE

## 2023-08-28 PROCEDURE — G0177 OPPS/PHP; TRAIN & EDUC SERV: HCPCS

## 2023-08-28 PROCEDURE — 250N000011 HC RX IP 250 OP 636: Performed by: FAMILY MEDICINE

## 2023-08-28 RX ORDER — METRONIDAZOLE 500 MG/1
500 TABLET ORAL 2 TIMES DAILY
Qty: 11 TABLET | Refills: 0 | Status: SHIPPED | OUTPATIENT
Start: 2023-08-28 | End: 2023-09-14

## 2023-08-28 RX ORDER — POLYETHYLENE GLYCOL 3350 17 G/17G
17 POWDER, FOR SOLUTION ORAL DAILY
Qty: 510 G | Refills: 0 | Status: SHIPPED | OUTPATIENT
Start: 2023-08-28 | End: 2023-09-14

## 2023-08-28 RX ORDER — NICOTINE 21 MG/24HR
1 PATCH, TRANSDERMAL 24 HOURS TRANSDERMAL DAILY
Qty: 30 PATCH | Refills: 0 | Status: SHIPPED | OUTPATIENT
Start: 2023-08-29 | End: 2023-09-14

## 2023-08-28 RX ORDER — DOXYCYCLINE 100 MG/1
100 CAPSULE ORAL 2 TIMES DAILY
Qty: 60 CAPSULE | Refills: 0 | Status: ON HOLD | OUTPATIENT
Start: 2023-08-28 | End: 2023-11-27

## 2023-08-28 RX ORDER — BUSPIRONE HYDROCHLORIDE 15 MG/1
15 TABLET ORAL 3 TIMES DAILY
Qty: 90 TABLET | Refills: 0 | Status: SHIPPED | OUTPATIENT
Start: 2023-08-28 | End: 2023-09-14

## 2023-08-28 RX ORDER — AMLODIPINE BESYLATE 10 MG/1
10 TABLET ORAL DAILY
Qty: 30 TABLET | Refills: 0 | Status: SHIPPED | OUTPATIENT
Start: 2023-08-28 | End: 2023-10-26

## 2023-08-28 RX ORDER — TRETINOIN 0.5 MG/G
CREAM TOPICAL AT BEDTIME
Qty: 45 G | Refills: 0 | Status: SHIPPED | OUTPATIENT
Start: 2023-08-28 | End: 2023-09-14

## 2023-08-28 RX ORDER — DOCUSATE SODIUM 100 MG/1
100 CAPSULE, LIQUID FILLED ORAL 2 TIMES DAILY
Qty: 60 CAPSULE | Refills: 0 | Status: SHIPPED | OUTPATIENT
Start: 2023-08-28 | End: 2023-09-14

## 2023-08-28 RX ORDER — PHENOL 1.4 %
10 AEROSOL, SPRAY (ML) MUCOUS MEMBRANE EVERY EVENING
Qty: 30 TABLET | Refills: 0 | Status: SHIPPED | OUTPATIENT
Start: 2023-08-28 | End: 2023-08-29

## 2023-08-28 RX ORDER — CLONAZEPAM 0.5 MG/1
0.5 TABLET ORAL DAILY
Qty: 30 TABLET | Refills: 0 | Status: SHIPPED | OUTPATIENT
Start: 2023-08-28 | End: 2023-09-14

## 2023-08-28 RX ORDER — VALACYCLOVIR HYDROCHLORIDE 1 G/1
1000 TABLET, FILM COATED ORAL EVERY 12 HOURS
Qty: 13 TABLET | Refills: 0 | Status: SHIPPED | OUTPATIENT
Start: 2023-08-28 | End: 2023-09-14

## 2023-08-28 RX ORDER — TRIFLUOPERAZINE HYDROCHLORIDE 2 MG/1
2 TABLET, FILM COATED ORAL 2 TIMES DAILY
Qty: 60 TABLET | Refills: 0 | Status: SHIPPED | OUTPATIENT
Start: 2023-08-28 | End: 2023-09-14

## 2023-08-28 RX ORDER — ONDANSETRON 4 MG/1
4 TABLET, ORALLY DISINTEGRATING ORAL DAILY PRN
Qty: 30 TABLET | Refills: 0 | Status: ON HOLD | OUTPATIENT
Start: 2023-08-28 | End: 2023-11-27

## 2023-08-28 RX ORDER — LITHIUM CARBONATE 450 MG
900 TABLET, EXTENDED RELEASE ORAL AT BEDTIME
Qty: 60 TABLET | Refills: 0 | Status: SHIPPED | OUTPATIENT
Start: 2023-08-28 | End: 2023-09-14

## 2023-08-28 RX ORDER — GABAPENTIN 600 MG/1
1200 TABLET ORAL 3 TIMES DAILY
Qty: 180 TABLET | Refills: 0 | Status: SHIPPED | OUTPATIENT
Start: 2023-08-28 | End: 2023-10-23

## 2023-08-28 RX ORDER — VALACYCLOVIR HYDROCHLORIDE 1 G/1
1000 TABLET, FILM COATED ORAL EVERY 12 HOURS SCHEDULED
Status: DISCONTINUED | OUTPATIENT
Start: 2023-08-28 | End: 2023-08-28 | Stop reason: HOSPADM

## 2023-08-28 RX ORDER — DEXTROAMPHETAMINE SACCHARATE, AMPHETAMINE ASPARTATE MONOHYDRATE, DEXTROAMPHETAMINE SULFATE AND AMPHETAMINE SULFATE 6.25; 6.25; 6.25; 6.25 MG/1; MG/1; MG/1; MG/1
25 CAPSULE, EXTENDED RELEASE ORAL DAILY
Qty: 30 CAPSULE | Refills: 0 | Status: SHIPPED | OUTPATIENT
Start: 2023-09-07 | End: 2023-09-14

## 2023-08-28 RX ORDER — QUETIAPINE FUMARATE 100 MG/1
500 TABLET, FILM COATED ORAL AT BEDTIME
Qty: 150 TABLET | Refills: 0 | Status: SHIPPED | OUTPATIENT
Start: 2023-08-28 | End: 2023-09-14

## 2023-08-28 RX ADMIN — NICOTINE 1 PATCH: 21 PATCH, EXTENDED RELEASE TRANSDERMAL at 07:34

## 2023-08-28 RX ADMIN — INSULIN GLARGINE 23 UNITS: 100 INJECTION, SOLUTION SUBCUTANEOUS at 08:21

## 2023-08-28 RX ADMIN — NICOTINE POLACRILEX 4 MG: 4 GUM, CHEWING BUCCAL at 11:05

## 2023-08-28 RX ADMIN — TRIFLUOPERAZINE HYDROCHLORIDE 2 MG: 2 TABLET, FILM COATED ORAL at 07:34

## 2023-08-28 RX ADMIN — NICOTINE POLACRILEX 4 MG: 4 GUM, CHEWING BUCCAL at 12:28

## 2023-08-28 RX ADMIN — GABAPENTIN 1200 MG: 600 TABLET, FILM COATED ORAL at 07:34

## 2023-08-28 RX ADMIN — OLANZAPINE 10 MG: 5 TABLET, ORALLY DISINTEGRATING ORAL at 03:31

## 2023-08-28 RX ADMIN — BUSPIRONE HYDROCHLORIDE 15 MG: 15 TABLET ORAL at 13:07

## 2023-08-28 RX ADMIN — NICOTINE POLACRILEX 4 MG: 4 GUM, CHEWING BUCCAL at 15:43

## 2023-08-28 RX ADMIN — CLONAZEPAM 0.5 MG: 0.5 TABLET ORAL at 09:55

## 2023-08-28 RX ADMIN — DOXYCYCLINE HYCLATE 100 MG: 50 CAPSULE ORAL at 07:34

## 2023-08-28 RX ADMIN — NICOTINE POLACRILEX 4 MG: 4 GUM, CHEWING BUCCAL at 06:34

## 2023-08-28 RX ADMIN — NICOTINE POLACRILEX 4 MG: 4 GUM, CHEWING BUCCAL at 08:54

## 2023-08-28 RX ADMIN — NICOTINE POLACRILEX 4 MG: 4 GUM, CHEWING BUCCAL at 09:55

## 2023-08-28 RX ADMIN — EMPAGLIFLOZIN 10 MG: 10 TABLET, FILM COATED ORAL at 07:36

## 2023-08-28 RX ADMIN — POLYETHYLENE GLYCOL 3350 17 G: 17 POWDER, FOR SOLUTION ORAL at 07:34

## 2023-08-28 RX ADMIN — DOCUSATE SODIUM 100 MG: 100 CAPSULE, LIQUID FILLED ORAL at 07:35

## 2023-08-28 RX ADMIN — NICOTINE POLACRILEX 4 MG: 4 GUM, CHEWING BUCCAL at 13:41

## 2023-08-28 RX ADMIN — ONDANSETRON 4 MG: 4 TABLET, ORALLY DISINTEGRATING ORAL at 06:36

## 2023-08-28 RX ADMIN — METRONIDAZOLE 500 MG: 500 TABLET ORAL at 07:34

## 2023-08-28 RX ADMIN — BUSPIRONE HYDROCHLORIDE 15 MG: 15 TABLET ORAL at 07:35

## 2023-08-28 RX ADMIN — ACETAMINOPHEN, ASPIRIN, CAFFEINE 1 TABLET: 250; 65; 250 TABLET, FILM COATED ORAL at 05:15

## 2023-08-28 RX ADMIN — PANTOPRAZOLE SODIUM 40 MG: 40 TABLET, DELAYED RELEASE ORAL at 07:34

## 2023-08-28 RX ADMIN — GABAPENTIN 1200 MG: 600 TABLET, FILM COATED ORAL at 13:06

## 2023-08-28 RX ADMIN — TESTOSTERONE 20.25 MG: 20.25 GEL TOPICAL at 07:34

## 2023-08-28 RX ADMIN — NICOTINE POLACRILEX 4 MG: 4 GUM, CHEWING BUCCAL at 07:53

## 2023-08-28 RX ADMIN — AMLODIPINE BESYLATE 10 MG: 10 TABLET ORAL at 07:34

## 2023-08-28 ASSESSMENT — ACTIVITIES OF DAILY LIVING (ADL)
ADLS_ACUITY_SCORE: 42
LAUNDRY: WITH SUPERVISION
ADLS_ACUITY_SCORE: 42
ADLS_ACUITY_SCORE: 42
ORAL_HYGIENE: INDEPENDENT
ORAL_HYGIENE: INDEPENDENT
ADLS_ACUITY_SCORE: 42
HYGIENE/GROOMING: INDEPENDENT
ADLS_ACUITY_SCORE: 42
HYGIENE/GROOMING: INDEPENDENT
DRESS: SCRUBS (BEHAVIORAL HEALTH)
LAUNDRY: WITH SUPERVISION
ADLS_ACUITY_SCORE: 42
DRESS: SCRUBS (BEHAVIORAL HEALTH)
ADLS_ACUITY_SCORE: 42

## 2023-08-28 NOTE — PROGRESS NOTES
Pt states doesn't want herpes swab stating there is no way I could have an STD so the medications and the swab is not necessary.

## 2023-08-28 NOTE — PROVIDER NOTIFICATION
08/28/23 1436   Individualization/Patient Specific Goals   Patient Personal Strengths community support;expressive of emotions;expressive of needs;family/social support;humor;medication/treatment adherence;interests/hobbies;motivated for treatment;no history of violence;positive attitude;resilient;resourceful;stable living environment;tolerant   Patient Vulnerabilities adverse childhood experience(s);history of unsuccessful treatment;substance abuse/addiction;traumatic event   Anxieties, Fears or Concerns Current climate at group home being toxic   Individualized Care Needs IOP and DBT services   Interprofessional Rounds   Summary Pt was exhibiting behavioral concerns regarding SI and SIB behaviors during the weekend   Participants CTC;nursing;psychiatrist   Behavioral Team Discussion   Participants Gaye Ocampo-Nurse Mgr, RN, Troy Choe-[facility]   Progress Pt improving is able to contract for safety and is requesting to discharge   Anticipated length of stay 0 days   Medical/Physical Type 2 Diabetes   Plan Discharge to group home   Rationale for change in precautions or plan Pt agreeable to DBT IOP outpatient services   Safety Plan Safe Milieu   Anticipated Discharge Disposition group home     PRECAUTIONS AND SAFETY    Behavioral Orders   Procedures    Code 1 - Restrict to Unit    Routine Programming     As clinically indicated    Self Injury Precaution    Status 15     Every 15 minutes.    Suicide precautions     Patients on Suicide Precautions should have a Combination Diet ordered that includes a Diet selection(s) AND a Behavioral Tray selection for Safe Tray - with utensils, or Safe Tray - NO utensils         Safety  Safety WDL: WDL  Patient Location: [facility] [place] [facility]  Observed Behavior: calm  Observed Behavior (Comment): Sitting on the floor  Safety Measures: safety rounds completed  Diversional Activity: music, art work  Suicidality: Status 15, SIO (Status Individual Observation)  (NOTE - order will specify  distance  Elopement Interventions: status 15, behavioral scrubs (luzma)

## 2023-08-28 NOTE — PROGRESS NOTES
"   08/28/23 1549   Group Therapy Session   Group Attendance attended group session   Time Session Began 1115   Time Session Ended 1200   Total Time (minutes) 45   Total # Attendees 1   Group Type psychoeducation   Group Topic Covered coping skills/lifestyle management   Group Session Detail topic group   Patient Response/Contribution cooperative with task;able to recall/repeat info presented;organized     Topic Group:  Pt let writer know he was set to discharge today.  When asked how this was determined, pt replied \"being here has become stressful more than it's helpful\", and began talking about the bothersome behaviors of others (yelling, laughing due to psychosis, agitated behaviors, etc).  Pt repeats from last week he feels good about discharge plan, plus has an appointment this week to look at an apartment.  Discussed stress management/coping skills, and ways to prevent and manage in crisis.  "

## 2023-08-28 NOTE — DISCHARGE SUMMARY
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    Collection Time: 08/15/23 12:23 PM   Result Value Ref Range    GLUCOSE BY METER POCT 129 (H) 70 - 99 mg/dL   Troponin T, High Sensitivity    Collection Time: 08/15/23  4:15 PM   Result Value Ref Range    Troponin T, High Sensitivity <6 <=22 ng/L   Glucose by meter    Collection Time: 08/15/23  5:15 PM   Result Value Ref Range    GLUCOSE BY METER POCT 179 (H) 70 - 99 mg/dL   Glucose by meter    Collection Time: 08/15/23  9:29 PM   Result Value Ref Range    GLUCOSE BY METER POCT 199 (H) 70 - 99 mg/dL   Glucose by meter    Collection Time: 08/16/23  8:21 AM   Result Value Ref Range    GLUCOSE BY METER POCT 165 (H) 70 - 99 mg/dL   Glucose by meter    Collection Time: 08/16/23 12:38 PM   Result Value Ref Range    GLUCOSE BY METER POCT 140 (H) 70 - 99 mg/dL   Glucose by meter    Collection Time: 08/16/23  5:13 PM   Result Value Ref Range    GLUCOSE BY METER POCT 139 (H) 70 - 99 mg/dL   Glucose by meter    Collection Time: 08/16/23 10:05 PM   Result Value Ref Range    GLUCOSE BY METER POCT 243 (H) 70 - 99 mg/dL   Glucose by meter    Collection Time: 08/17/23  7:41 AM   Result Value Ref Range    GLUCOSE BY METER POCT 161 (H) 70 - 99 mg/dL   Glucose by meter    Collection Time: 08/17/23 12:29 PM   Result Value Ref  Range    GLUCOSE BY METER POCT 166 (H) 70 - 99 mg/dL   Glucose by meter    Collection Time: 08/17/23  5:03 PM   Result Value Ref Range    GLUCOSE BY METER POCT 164 (H) 70 - 99 mg/dL   Glucose by meter    Collection Time: 08/17/23  9:26 PM   Result Value Ref Range    GLUCOSE BY METER POCT 137 (H) 70 - 99 mg/dL   Glucose by meter    Collection Time: 08/18/23  8:06 AM   Result Value Ref Range    GLUCOSE BY METER POCT 201 (H) 70 - 99 mg/dL   Glucose by meter    Collection Time: 08/18/23 10:57 AM   Result Value Ref Range    GLUCOSE BY METER POCT 185 (H) 70 - 99 mg/dL   Glucose by meter    Collection Time: 08/18/23 12:02 PM   Result Value Ref Range    GLUCOSE BY METER POCT 168 (H) 70 - 99 mg/dL   Glucose by meter    Collection Time: 08/18/23  5:30 PM   Result Value Ref Range    GLUCOSE BY METER POCT 136 (H) 70 - 99 mg/dL   Glucose by meter    Collection Time: 08/19/23  7:36 AM   Result Value Ref Range    GLUCOSE BY METER POCT 285 (H) 70 - 99 mg/dL   Glucose by meter    Collection Time: 08/19/23 12:07 PM   Result Value Ref Range    GLUCOSE BY METER POCT 241 (H) 70 - 99 mg/dL   Glucose by meter    Collection Time: 08/19/23  5:47 PM   Result Value Ref Range    GLUCOSE BY METER POCT 225 (H) 70 - 99 mg/dL   Glucose by meter    Collection Time: 08/19/23 10:03 PM   Result Value Ref Range    GLUCOSE BY METER POCT 238 (H) 70 - 99 mg/dL   Glucose by meter    Collection Time: 08/20/23  7:31 AM   Result Value Ref Range    GLUCOSE BY METER POCT 247 (H) 70 - 99 mg/dL   Glucose by meter    Collection Time: 08/20/23 11:36 AM   Result Value Ref Range    GLUCOSE BY METER POCT 210 (H) 70 - 99 mg/dL   Glucose by meter    Collection Time: 08/20/23  5:05 PM   Result Value Ref Range    GLUCOSE BY METER POCT 141 (H) 70 - 99 mg/dL   Glucose by meter    Collection Time: 08/20/23 10:01 PM   Result Value Ref Range    GLUCOSE BY METER POCT 230 (H) 70 - 99 mg/dL   Glucose by meter    Collection Time: 08/21/23  7:59 AM   Result Value Ref Range     GLUCOSE BY METER POCT 236 (H) 70 - 99 mg/dL   Glucose by meter    Collection Time: 08/21/23 12:05 PM   Result Value Ref Range    GLUCOSE BY METER POCT 198 (H) 70 - 99 mg/dL   Glucose by meter    Collection Time: 08/21/23  5:51 PM   Result Value Ref Range    GLUCOSE BY METER POCT 282 (H) 70 - 99 mg/dL   Glucose by meter    Collection Time: 08/21/23  8:14 PM   Result Value Ref Range    GLUCOSE BY METER POCT 308 (H) 70 - 99 mg/dL   Lithium level    Collection Time: 08/22/23  7:09 AM   Result Value Ref Range    Lithium 0.50 (L) 0.60 - 1.20 mmol/L   Glucose by meter    Collection Time: 08/22/23  7:55 AM   Result Value Ref Range    GLUCOSE BY METER POCT 230 (H) 70 - 99 mg/dL   Glucose by meter    Collection Time: 08/22/23 11:44 AM   Result Value Ref Range    GLUCOSE BY METER POCT 220 (H) 70 - 99 mg/dL   Glucose by meter    Collection Time: 08/22/23  5:21 PM   Result Value Ref Range    GLUCOSE BY METER POCT 239 (H) 70 - 99 mg/dL   Glucose by meter    Collection Time: 08/22/23 10:14 PM   Result Value Ref Range    GLUCOSE BY METER POCT 277 (H) 70 - 99 mg/dL   Glucose by meter    Collection Time: 08/23/23  7:43 AM   Result Value Ref Range    GLUCOSE BY METER POCT 230 (H) 70 - 99 mg/dL   Glucose by meter    Collection Time: 08/23/23 12:05 PM   Result Value Ref Range    GLUCOSE BY METER POCT 187 (H) 70 - 99 mg/dL   Glucose by meter    Collection Time: 08/23/23  5:34 PM   Result Value Ref Range    GLUCOSE BY METER POCT 235 (H) 70 - 99 mg/dL   Glucose by meter    Collection Time: 08/23/23  8:39 PM   Result Value Ref Range    GLUCOSE BY METER POCT 310 (H) 70 - 99 mg/dL   Glucose by meter    Collection Time: 08/24/23  6:32 AM   Result Value Ref Range    GLUCOSE BY METER POCT 271 (H) 70 - 99 mg/dL   Glucose by meter    Collection Time: 08/24/23 12:06 PM   Result Value Ref Range    GLUCOSE BY METER POCT 198 (H) 70 - 99 mg/dL   Glucose by meter    Collection Time: 08/24/23  5:37 PM   Result Value Ref Range    GLUCOSE BY METER POCT 264  (H) 70 - 99 mg/dL   UA with Microscopic reflex to Culture    Collection Time: 08/24/23  9:26 PM    Specimen: Urine, Clean Catch   Result Value Ref Range    Color Urine Light Yellow Colorless, Straw, Light Yellow, Yellow    Appearance Urine Clear Clear    Glucose Urine >=1000 (A) Negative mg/dL    Bilirubin Urine Negative Negative    Ketones Urine Trace (A) Negative mg/dL    Specific Gravity Urine 1.021 1.003 - 1.035    Blood Urine Negative Negative    pH Urine 6.5 5.0 - 7.0    Protein Albumin Urine Negative Negative mg/dL    Urobilinogen Urine Normal Normal, 2.0 mg/dL    Nitrite Urine Negative Negative    Leukocyte Esterase Urine Negative Negative    RBC Urine 0 <=2 /HPF    WBC Urine <1 <=5 /HPF    Squamous Epithelials Urine 5 (H) <=1 /HPF   Glucose by meter    Collection Time: 08/24/23 10:19 PM   Result Value Ref Range    GLUCOSE BY METER POCT 280 (H) 70 - 99 mg/dL   Glucose by meter    Collection Time: 08/25/23  1:46 AM   Result Value Ref Range    GLUCOSE BY METER POCT 255 (H) 70 - 99 mg/dL   Glucose by meter    Collection Time: 08/25/23  7:57 AM   Result Value Ref Range    GLUCOSE BY METER POCT 301 (H) 70 - 99 mg/dL   Comprehensive metabolic panel    Collection Time: 08/25/23  9:38 AM   Result Value Ref Range    Sodium 138 136 - 145 mmol/L    Potassium 4.1 3.4 - 5.3 mmol/L    Chloride 100 98 - 107 mmol/L    Carbon Dioxide (CO2) 25 22 - 29 mmol/L    Anion Gap 13 7 - 15 mmol/L    Urea Nitrogen 11.2 6.0 - 20.0 mg/dL    Creatinine 0.66 0.51 - 1.17 mg/dL    Calcium 10.4 (H) 8.6 - 10.0 mg/dL    Glucose 287 (H) 70 - 99 mg/dL    Alkaline Phosphatase 106 35 - 129 U/L    AST 72 (H) 0 - 45 U/L    ALT 92 (H) 0 - 70 U/L    Protein Total 8.2 6.4 - 8.3 g/dL    Albumin 4.8 3.5 - 5.2 g/dL    Bilirubin Total 0.2 <=1.2 mg/dL    GFR Estimate >90 >60 mL/min/1.73m2   CBC with platelets and differential    Collection Time: 08/25/23  9:38 AM   Result Value Ref Range    WBC Count 9.4 4.0 - 11.0 10e3/uL    RBC Count 5.36 3.80 - 5.90  10e6/uL    Hemoglobin 14.8 11.7 - 17.7 g/dL    Hematocrit 44.1 35.0 - 53.0 %    MCV 82 78 - 100 fL    MCH 27.6 26.5 - 33.0 pg    MCHC 33.6 31.5 - 36.5 g/dL    RDW 14.6 10.0 - 15.0 %    Platelet Count 325 150 - 450 10e3/uL    % Neutrophils 67 %    % Lymphocytes 23 %    % Monocytes 6 %    % Eosinophils 2 %    % Basophils 1 %    % Immature Granulocytes 1 %    NRBCs per 100 WBC 0 <1 /100    Absolute Neutrophils 6.3 1.6 - 8.3 10e3/uL    Absolute Lymphocytes 2.2 0.8 - 5.3 10e3/uL    Absolute Monocytes 0.6 0.0 - 1.3 10e3/uL    Absolute Eosinophils 0.2 0.0 - 0.7 10e3/uL    Absolute Basophils 0.1 0.0 - 0.2 10e3/uL    Absolute Immature Granulocytes 0.1 <=0.4 10e3/uL    Absolute NRBCs 0.0 10e3/uL   Glucose by meter    Collection Time: 08/25/23 11:35 AM   Result Value Ref Range    GLUCOSE BY METER POCT 267 (H) 70 - 99 mg/dL   Treponema Abs w Reflex to RPR and Titer    Collection Time: 08/25/23  5:13 PM   Result Value Ref Range    Treponema Antibody Total Nonreactive Nonreactive   Herpes Simplex Virus 1 and 2 IgG    Collection Time: 08/25/23  5:13 PM   Result Value Ref Range    HSV Type 1 IgG Instrument Value 16.90 (H) <0.90 Index    Herpes Simplex Virus Type 1 IgG Antibody Positive.  IgG antibody to HSV-1 detected. (A) No HSV-1 IgG antibodies detected    HSV Type 2 IgG Instrument Value 1.99 (H) <0.90 Index    Herpes Simplex Virus Type 2 IgG Antibody Positive.  IgG antibody to HSV-2 detected. (A) No HSV-2 IgG antibodies detected   Glucose by meter    Collection Time: 08/25/23  5:15 PM   Result Value Ref Range    GLUCOSE BY METER POCT 247 (H) 70 - 99 mg/dL   Chlamydia trachomatis/Neisseria gonorrhoeae by PCR    Collection Time: 08/25/23  6:21 PM    Specimen: Urine, Voided   Result Value Ref Range    Chlamydia Trachomatis Negative Negative    Neisseria gonorrhoeae Negative Negative   Glucose by meter    Collection Time: 08/25/23  9:11 PM   Result Value Ref Range    GLUCOSE BY METER POCT 374 (H) 70 - 99 mg/dL   Glucose by meter     Collection Time: 08/25/23  9:47 PM   Result Value Ref Range    GLUCOSE BY METER POCT 351 (H) 70 - 99 mg/dL   Glucose by meter    Collection Time: 08/25/23 10:54 PM   Result Value Ref Range    GLUCOSE BY METER POCT 275 (H) 70 - 99 mg/dL   Glucose by meter    Collection Time: 08/26/23  2:18 AM   Result Value Ref Range    GLUCOSE BY METER POCT 222 (H) 70 - 99 mg/dL   Glucose by meter    Collection Time: 08/26/23  7:42 AM   Result Value Ref Range    GLUCOSE BY METER POCT 264 (H) 70 - 99 mg/dL   Wet preparation    Collection Time: 08/26/23 10:07 AM    Specimen: Vagina; Swab   Result Value Ref Range    Trichomonas Absent Absent    Yeast Absent Absent    Clue Cells Present (A) Absent    WBCs/high power field 2+ (A) None   Glucose by meter    Collection Time: 08/26/23 11:53 AM   Result Value Ref Range    GLUCOSE BY METER POCT 185 (H) 70 - 99 mg/dL   Glucose by meter    Collection Time: 08/26/23  5:28 PM   Result Value Ref Range    GLUCOSE BY METER POCT 211 (H) 70 - 99 mg/dL   Glucose by meter    Collection Time: 08/26/23  9:24 PM   Result Value Ref Range    GLUCOSE BY METER POCT 313 (H) 70 - 99 mg/dL   Glucose by meter    Collection Time: 08/27/23 12:42 AM   Result Value Ref Range    GLUCOSE BY METER POCT 249 (H) 70 - 99 mg/dL   Glucose by meter    Collection Time: 08/27/23  7:43 AM   Result Value Ref Range    GLUCOSE BY METER POCT 245 (H) 70 - 99 mg/dL   Glucose by meter    Collection Time: 08/27/23 11:53 AM   Result Value Ref Range    GLUCOSE BY METER POCT 174 (H) 70 - 99 mg/dL   Glucose by meter    Collection Time: 08/27/23  5:16 PM   Result Value Ref Range    GLUCOSE BY METER POCT 187 (H) 70 - 99 mg/dL   Glucose by meter    Collection Time: 08/27/23  9:51 PM   Result Value Ref Range    GLUCOSE BY METER POCT 162 (H) 70 - 99 mg/dL   Glucose by meter    Collection Time: 08/28/23  1:13 AM   Result Value Ref Range    GLUCOSE BY METER POCT 178 (H) 70 - 99 mg/dL   Glucose by meter    Collection Time: 08/28/23  7:45 AM   Result  "Value Ref Range    GLUCOSE BY METER POCT 187 (H) 70 - 99 mg/dL   Lithium level    Collection Time: 08/28/23  8:58 AM   Result Value Ref Range    Lithium 0.40 (L) 0.60 - 1.20 mmol/L   Glucose by meter    Collection Time: 08/28/23 12:10 PM   Result Value Ref Range    GLUCOSE BY METER POCT 142 (H) 70 - 99 mg/dL          Consults:   Consultation during this admission received from internal medicine on 8/15/23:    Expand All Collapse All    [facility]  Consult Note - Hospitalist Service  Date of Admission:  8/10/2023  Consult Requested by: Cheyenne Andrade MD  Reason for Consult: diabetic, refusing insulin, please evaluate for other options.     Assessment & Plan  Ayana \"Prakash\" VINCENT Prasad is a 33 year old adult admitted on 8/13/2023 to inpatient psychiatry through ED where he initially presented on 8/10/23. He has a history of type 2 diabetes.      Type 2 diabetes mellitus  Patient is prescribed Lantus 15 units every morning and Ozempic weekly. He has not been compliant with this regimen for months. He states he believes that insulin is poison, that he was told this by someone. Most recent A1c in May was 9.1%. He has been refusing Lantus here. After some education and discussion, patient was willing to try taking the Lantus here.   - continue PTA Lantus 15 units q am  - hold Ozempic here  - hypoglycemia protocol  - glucose monitoring POC PRN  - check A1c  - recommend followup with PCP at discharge for continued management     Left upper back and shoulder pain  Patient reports starting from waking this am to now that he has had pain to his left upper back region radiating to his shoulder to his left chest and left arm. He states at its worst it was 8/10 in severity. He took ibuprofen and this did help. The pain is reproducible on exam. He denies any recent SOB, nausea, diaphoresis, clamminess.   - check troponin  - if negative, assume MSK cause, continue with ibuprofen PRN.    " "  Medicine service will follow peripherally to evaluate troponin.         The patient's care was discussed with the Bedside Nurse and Patient.        Clinically Significant Risk Factors                   # Hypertension: Noted on problem list       # DMII: A1C = N/A within past 6 months, PRESENT ON ADMISSION  # Obesity: Estimated body mass index is 37.66 kg/m  as calculated from the following:    Height as of this encounter: 1.676 m (5' 6\").    Weight as of this encounter: 105.8 kg (233 lb 4.8 oz)., PRESENT ON ADMISSION             Yolie Rodriguez PA-C     Diabetes Education Consultation dated 8/14/23:    Diabetes Educator consult received for Ayana OQUENDO \"Prakash\" Shanice, age 33, admitted 8/13/23 for suicidal ideation via ED.  EMS brought patient to ED and admitted on a 72 hour hold.  Patient identifies as a transgender male.  PMH is significant for schizoaffective disorder, bipolar type, borderline personality disorder, AVA, ADHD, PTSD and polysubstance abuse. Pt currently under stay of commitment through [facility].      Patient has history of type 2 DM initially treated with Metformin but he could not tolerate the side effects.  More recently he has been prescribed Lantus 15 units every morning and Ozempic weekly. He denies any side effects from the Ozempic.  He has not been compliant with this regimen for months. He states he believes that insulin is poison, he is afraid of it, [employer] told him the insulin is poison. Most recent A1c 8/10/23 is 8.8%.  He does have an Accu Chek glucose meter at home.     Met with Prakash on unit to discuss his diabetes history and provide some education regarding diabetes and its treatment.   Prakash asked if there were pills.  Described the difference between type 1 and type 2 DM, action of insulin, how our body makes and releases insulin to effectively use \"our fuel\", and that it is not poison.  Identified signs of a high sugar and he pointed to ones he has had on the paper; polyuria, " "polydipsia, fatigue.  He wants to think about what we discussed. Provided Prakash with Understanding Diabetes Booklet.     My recommendation would be to explore if an oral agent other than Metformin might be an alternative for glycemic mgt.  Not knowing his full DM history, do not know if he ever had C-Peptide, AVA run.  Something to consider.     BROOKLYN Ann    Consultation during this admission received from internal medicine on 8/19/23:      [facility]  Consult Note - Hospitalist Service  Date of Admission:  8/10/2023  Consult Requested by: Zandra Pompa MD  Reason for Consult: headache, without PRNs        Assessment & Plan  Ayana \"Prakash\" VINCENT Prasad is a 33 year old adult admitted on 8/13/2023 to inpatient psychiatry through ED where he initially presented on 8/10/23. He has a history of type 2 diabetes.      Medicine service consulted for the above-headaches, also following for type 2 diabetes mellitus management.       Acute headache  Patient reports headaches daily for the last few days.  He states he feels some posterior aspect of his head, rated 7/10 at its worst.  He feels that he is withdrawing from Adderall.  He denies any recent dizziness, vision changes, lightheadedness.  He did try 200 mg one-time Motrin ordered by psychiatrist this morning, he states that this did not offer any improvement.  He also does endorse increased anxiety, fatigue, and some mild nausea.  He is agreeable to trying as needed Tylenol to start.  Patient does state that he takes Excedrin at home for headaches.  - start 650 mg Tylenol PRN Q4H  - considered motrin, but there is concern that it will increase lithium levels, thus held at this time.     Type 2 diabetes mellitus  Patient is prescribed Lantus 15 units every morning and Ozempic weekly at baseline. He has not been compliant with this regimen for months. He states he believes that insulin is poison, that he was " "told this by someone. Most recent A1c in May was 9.1%.  A1c checked and is 8.8% here.  Patient was initially refusing Lantus here. After some education and discussion, patient was willing to try taking the Lantus here.   - Increase Lantus from 15 units daily to 17 units daily, his fasting blood sugars remain elevated  -Start low-dose sliding scale insulin, due to BG >250. D/w patient and he is agreeable  - Plan to resume Ozempic after discharge  - hypoglycemia protocol  - glucose monitoring POC PRN  - recommend followup with PCP at discharge for continued management        Medicine service will follow peripherally to monitor headache and diabetes management.         The patient's care was discussed with the Bedside Nurse, psychiatry, and patient.        Yolie Rodriguez PA-C     Consultation during this admission received from internal medicine on 8/25/23:    Internal Medicine Progress Note        Assessment and plan:  Ayana \"josie\" VINCENT Prasad is a 33 year old transgender female to male adult with a history of DM2, GERD, HTN, ADHD, bipolar, borderline personality. Medicine following for DM2 and new consult for hematuria.     #Hematuria  #BRBPR  Pt noted blood in toilet. Originally thought to be hematuria- no blood noted on UA. Female anatomy- unclear if is menstruating. This AM he noted blood with stool. Pt refused to speak with writer at this time.   -Unable to order preparation H with steroid allergies  -witch hazel pads to rectum PRN bleeding, pruritus   -Notify medicine with further bleeding concerns     #Type 2 diabetes mellitus  See trends below. Patient is prescribed Lantus 15 units every morning and Ozempic weekly at baseline. He has not been compliant with this regimen for months. He states he believes that insulin is poison, that he was told this by someone. Most recent A1c in May was 9.1%.  A1c checked and is 8.8% here.  Patient was initially refusing Lantus here. After some education and discussion, patient " "was willing to try taking the Lantus here.   -Lantus increased from 20 to 23 units daily- may be able to decrease after discharge with resumption of Ozempic   -Sliding scale insulin increased to high insulin resistance  -Add Novolog 3 units scheduled with meals  -Pharmacy liaison consult for Jardiance coverage  -POCT BG monitoring  -Hypoglycemia protocol      Objective:  BP (!) 139/95 (BP Location: Right arm, Patient Position: Sitting, Cuff Size: Adult Large)   Pulse 105   Temp 98.5  F (36.9  C) (Temporal)   Resp 16   Ht 1.676 m (5' 6\")   Wt 109.5 kg (241 lb 4.8 oz)   SpO2 94%   BMI 38.95 kg/m       Vitals signs reviewed and noted     GENERAL: Alert and oriented x3        Pertinent labs and procedures were reviewed      Subjective:      Pt on phone in hallway. When pt finished phone call, he was visibly upset and refused to speak with writer. Spoke with pt RN for hx of blood in toilet. RN will reach out if patient would like to speak with provider.      BROOKLYN Bailey CNP    Consultation during this admission received from internal medicine on 8/26/23:    Assessment and plan:  Ayana \"Prakash\" VINCENT Prasad is a 33 year old adult with a history of  DM2, GERD, HTN, ADHD, bipolar, borderline personality. Following for DM2, saw patient today for vaginal lesion.      #Vaginal lesion  Pt declined vaginal exam. States lesion is on the right side of vaginal area. States it is painful and bleeds. Denies any malodorous discharge. Denies hx of STIs and/or unsafe sexual activity. Has a history of oral herpes.   -Chlamydia and gonorrhea pending  -Syphilis pending  -Herpes simplex Type 1 and 2 pending  -Wet prep pending  -Clean area with warm water and mild soap, pat dry  -Notify medicine with changes or worsening symptoms of lesion     #Type 2 diabetes mellitus  See trends below. Patient is prescribed Lantus 15 units every morning and Ozempic weekly at baseline. He has not been compliant with this regimen for months. He " "states he believes that insulin is poison, that he was told this by someone. Most recent A1c in May was 9.1%.  A1c checked and is 8.8% here.  Patient was initially refusing Lantus here. After some education and discussion, patient was willing to try taking the Lantus here.   -Lantus increased from 20 to 23 units daily- may be able to decrease after discharge with resumption of Ozempic   -Sliding scale insulin increased to high insulin resistance  -Add Novolog 3 units scheduled with meals  -Start Jardiance 10 mg daily (pharmacy liaison consulted and covered under pt insurance)  -POCT BG monitoring  -Hypoglycemia protocol     Consultation during this admission received from internal medicine on 8/27/23:    Assessment and plan:  Ayana \"Randall\" VINCENT Prasad is a 33 year old adult with a history of  DM2, GERD, HTN, ADHD, bipolar, borderline personality. Following for DM2, saw patient today for vaginal lesion.      #Vaginal lesion  #Bacterial vaginosis  Pt declined vaginal exam. States lesion is on the right side of vaginal area. States it is painful and bleeds. Denies any malodorous discharge. Denies hx of STIs and/or unsafe sexual activity. Has a history of oral herpes. Chlamydia, gonorrhea, syphilis testing negative. Wet prep + clue cells.   -Metronidazole 500 mg BID for 7 days  -Herpes simplex Type 1 and 2 pending  -Clean area with warm water and mild soap, pat dry  -Notify medicine with changes or worsening symptoms of lesion     #Type 2 diabetes mellitus  See trends below. Patient is prescribed Lantus 15 units every morning and Ozempic weekly at baseline. He has not been compliant with this regimen for months. He states he believes that insulin is poison, that he was told this by someone. Most recent A1c in May was 9.1%.  A1c checked and is 8.8% here.  Patient was initially refusing Lantus here. After some education and discussion, patient was willing to try taking the Lantus here.   -Lantus increased from 20 to 23 " "units daily- may be able to decrease after discharge with resumption of Ozempic   -Sliding scale insulin increased to high insulin resistance  -Increase Novolog to 4 units scheduled with meals  -Jardiance 10 mg daily (pharmacy liaison consulted and covered under pt insurance)  -POCT BG monitoring  -Hypoglycemia protocol     Consultation during this admission received from internal medicine on 8/28/23:      Brief Medicine Follow Up Note     Following up regarding HSV testing.      Today's vital signs, medications, and nursing notes were reviewed. Labs reviewed most recent serum and urine laboratories.      BP (!) 138/90 (Patient Position: Sitting, Cuff Size: Adult Large)   Pulse 99   Temp 97.5  F (36.4  C) (Oral)   Resp 16   Ht 1.676 m (5' 6\")   Wt 109.5 kg (241 lb 4.8 oz)   SpO2 95%   BMI 38.95 kg/m       A/P:  Vaginal Lesion  Per colleague's note on 8/27 (Ashlyn Linton, BROOKLYN CNP), pt had reported a lesion on the R side of the vulva, adjacent to the groin, which had been open, and actively draining a serous fluid (RN this AM also reports some blood). 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 This does not differentiate between an active vs resolved infection. Thus, plan as below.  - Given ongoing active symptoms, d/w RN to swab the area and send for HSV-1 and HSV-2 PCR  - Will initiate treatment w/ Valtrex 1g BID x7 days in the meantime for presumed HSV outbreak              - If HSV PCR negative, will discontinue and reevaluate lesion              - If positive, will continue Valtrex as scheduled  _____________________________________     ADDENDUM 8/28/2023 11:35 AM:  Medicine notified later this AM that patient will be discharging to his group home today. Thus, in light of above concern for HSV, and discharging today, will not have time to complete workup, so will treat for presumed HSV infection w/ Valtrex 1g BID x7 days. Would recommend close " "follow-up w/ PCP after this.     Medicine will sign off. No further recommendations at this time.  Please feel free to reconsult if any new medical issues or concerns.  Thank you for the opportunity to care for this patient.      Pool Mclean PA-C            Hospital Course:   Ayana Prasad was [facility] with attending Tessa Mendoza MD on a 72 hour mental health hold. Stay of commitment was vacated. Of note, during last hospital stay, patient requested to be placed on a commitment for \"more accountability.\" The patient was placed under status 15 (15 minute checks) to ensure patient safety.     On admission, Adderall was held due to concerns for psychosis. Seroquel was increased to a total daily dose of 300 mg. It was again increased and titrated to current dose of 500 gm at bedtime on 8/25. Patient reported overall improvement in AH, noting that they were no longer command in nature as of 8/26. He also reported improvement in sleep and racing thoughts.     Regarding suicidal thinking, patient reported \"intense SI\" at times throughout his hospital stay. He had an episode of self harm via head banging on 8/17 and also punched a wall on 8/23. He was encouraged to utilize nonpharmacologic coping strategies, including distress tolerance techniques and other DBT skills he was unfamiliar with. At times, he was unable to contract for safety, which warranted 1:1 monitoring. When 1:1 was discontinued after patient contracted for safety, it was reinstated about 6 hours later after he notified staff he could not be safe. He indicated that he \"felt comfortable\" with SIO staff in place. He would often interact with SIO staff by playing basketball, card games, etc. Staff expressed concerns about this intervention as it appeared to be reinforcing maladaptive behaviors in an attempt to seek connection/ \"comfort\" from staff. Prakash has not engaged in SIB since 8/23.     Lithium was added on 8/17 to target " "mood dysregulation and intense SI. It was titrated to current dose of 900 mg at bedtime on 8/23. Level checked on 8/28 and was 0.4. Prakash tolerated this dose well without reported side effects and overall improvement in suicidal thinking.    On Monday, 8/28, Prakash said that the weekend was \"rough but not bad.\" He reported that suicidal thinking is back at baseline as he continues to experience it daily but it is no longer \"intense\" or associated with a plan. He said that they are manageable and would be outside of the hospital as well. He was eager to discharge from the hospital when informed that the SIO monitoring would be discontinued due to absence of active SI, intent, or SIB urges. He asked if it could remain in place longer despite that. He then asked for discharge, reporting overall improvement. His affect was bright when informed that discharge may be a possibility. He said \"To be honest, though, I do think the Lithium is helping and I don't think staying here any longer will be helpful for me. There is a manic patient here that is really disruptive. I am not getting good sleep, and I will be safe outside of the hospital.\" He mentioned that he has had extended hospital stays in the past, \"and I end up getting worse. I am ready to go. I'll be safe.\"     Given absence of imminent safety concerns, limited nonpharmacologic coping strategies in restrictive hospital setting, patient's willingness to engage in aftercare plan, discharge was granted. In addition, extended hospital stay would likely reinforce maladaptive/avoidant behaviors (pt voiced several concerns about home environment, including conflict with staff, lack of structure and support, and bad food). Patient is future oriented, expressing desire to live independently and go back to college. He demonstrated consistent ability to ask for help when needed.     Of  note, during patient's hospital stay, he exhibited symptoms most consistent with " Borderline personality disorder. Other than reported longstanding AH (we can see transient symptoms of psychosis in context of Borderline personality disorder without presence of primary psychotic illness), no overt signs of psychosis or haven were observed throughout patient's hospital stay.     Prakash Prasad did participate in groups and was visible in the milieu.     The patient's symptoms of active SI with intent resolved.     Ayana Prasad was released to home. At the time of discharge Ayana Prasad was determined to not be a danger to himself or others.     I discussed patient's care with outpatient CM, Cristy, at the time of patient's discharge. She will assist patient with completing paperwork necessary for patient to engage in DBT upon discharge. She greed that he is improving and is future oriented. She also agreed that DBT will be a crucial treatment intervention for patient.     RISK ASSESSMENT:  Today Prakash Prasad denies SI, SIB, and HI. No overt evidence of psychosis or haven observed. Patient grossly appears to be cognitively intact. Patient has not exhibited any aggressive or violent behaviors throughout hospitalization. He has notable risk factors for self-harm including previous suicide attempt, recent psych inpt stay and new/ worsening medical issue.  However, risk is mitigated by no plan or intent, no access to lethal means, describes a safety plan, h/o seeking help when needed, symptom improvement, future oriented, feeling hopeful, none to minimal alcohol use , commitment to family, good social support, participation in DBT or day program and protective factors, including family and his dog, Nugget. Patient does not have access to firearms. Based on all available evidence he does not appear to be at imminent risk for self-harm therefore does not meet criteria for a 72-hr hold/ involuntary hospitalization.  However, based on degree of symptoms therapy, DBT and close psych FU was  recommended which the pt did agree to. Patient also agreed to call 911/present to ED if any imminent safety concerns arise, including emergence of SI, HI, symptoms of psychosis or haven. Patient was provided with crisis resources at the time of discharge. Patient agreed to further reduce risk of self-harm by completely abstaining from illicit substances and alcohol, and agreed to remain medication adherent. Expressed understanding of the risks associated with excessive alcohol use, illicit substance use, and medication/treatment non-adherence, including increased risk of harm to self or others.     Future considerations:  - Pt is focused on ECT vs Ketamine injections for management of depression, however, would first recommend optimizing Lithium and encouraging patient to complete [facility] adherent DBT program prior to considering more invasive treatment approaches.  - Consider discontinuing Stelazine given unclear benefit and potential for side effects  - Cautiously reintroduce Adderall as patient states that this medication has been hugely beneficial for him, and given low likelihood that patient has a primary psychotic illness  - Continue Klonopin 0.5 mg daily but plan to taper and discontinue as benzodiazepines have been shown to result in paradoxical effects in individuals with borderline personality disorder  - Would advocate for short hospital stays as extended hospital stays will likely be countertherapeutic by reinforcing maladaptive/avoidant behaviors         Discharge Medications:     Current Discharge Medication List        START taking these medications    Details   docusate sodium (COLACE) 100 MG capsule Take 1 capsule (100 mg) by mouth 2 times daily  Qty: 60 capsule, Refills: 0    Associated Diagnoses: Drug-induced constipation      empagliflozin (JARDIANCE) 10 MG TABS tablet Take 1 tablet (10 mg) by mouth daily  Qty: 30 tablet, Refills: 0    Associated Diagnoses: Type 2 diabetes mellitus with  hyperglycemia, without long-term current use of insulin (H)      !! insulin aspart (NOVOLOG PEN) 100 UNIT/ML pen Inject 1-10 Units Subcutaneous 3 times daily (before meals)  Qty: 15 mL, Refills: 0    Associated Diagnoses: Type 2 diabetes mellitus with hyperglycemia, unspecified whether long term insulin use (H)      !! insulin aspart (NOVOLOG PEN) 100 UNIT/ML pen Inject 1-7 Units Subcutaneous At Bedtime  Qty: 15 mL, Refills: 0    Associated Diagnoses: Type 2 diabetes mellitus with hyperglycemia, unspecified whether long term insulin use (H)      !! insulin aspart (NOVOLOG PEN) 100 UNIT/ML pen Inject 4 Units Subcutaneous daily (with breakfast)  Qty: 15 mL, Refills: 0    Associated Diagnoses: Type 2 diabetes mellitus with hyperglycemia, unspecified whether long term insulin use (H)      !! insulin aspart (NOVOLOG PEN) 100 UNIT/ML pen Inject 4 Units Subcutaneous daily (with lunch)  Qty: 15 mL, Refills: 0    Associated Diagnoses: Type 2 diabetes mellitus with hyperglycemia, unspecified whether long term insulin use (H)      !! insulin aspart (NOVOLOG PEN) 100 UNIT/ML pen Inject 4 Units Subcutaneous daily (with dinner)  Qty: 15 mL, Refills: 0    Associated Diagnoses: Type 2 diabetes mellitus with hyperglycemia, unspecified whether long term insulin use (H)      lithium (ESKALITH CR/LITHOBID) 450 MG CR tablet Take 2 tablets (900 mg) by mouth At Bedtime  Qty: 60 tablet, Refills: 0    Associated Diagnoses: Borderline personality disorder (H)      !! melatonin 10 MG TABS tablet Take 1 tablet (10 mg) by mouth every evening  Qty: 30 tablet, Refills: 0    Associated Diagnoses: Borderline personality disorder (H)      metroNIDAZOLE (FLAGYL) 500 MG tablet Take 1 tablet (500 mg) by mouth 2 times daily  Qty: 11 tablet, Refills: 0    Associated Diagnoses: Bacterial vaginosis      nicotine (NICODERM CQ) 21 MG/24HR 24 hr patch Place 1 patch onto the skin daily  Qty: 30 patch, Refills: 0    Associated Diagnoses: Tobacco abuse       nicotine polacrilex (NICORETTE) 4 MG gum Place 1 each (4 mg) inside cheek every hour as needed for smoking cessation  Qty: 220 each, Refills: 0    Associated Diagnoses: Tobacco abuse      polyethylene glycol (MIRALAX) 17 GM/Dose powder Take 17 g by mouth daily  Qty: 510 g, Refills: 0    Associated Diagnoses: Drug-induced constipation      trifluoperazine (STELAZINE) 2 MG tablet Take 1 tablet (2 mg) by mouth 2 times daily  Qty: 60 tablet, Refills: 0    Associated Diagnoses: Borderline personality disorder (H)      valACYclovir (VALTREX) 1000 mg tablet Take 1 tablet (1,000 mg) by mouth every 12 hours  Qty: 13 tablet, Refills: 0    Associated Diagnoses: HSV (herpes simplex virus) infection      witch hazel-glycerin (TUCKS) pad Apply topically every hour as needed for hemorrhoids  Qty: 100 each, Refills: 0    Associated Diagnoses: External hemorrhoids       !! - Potential duplicate medications found. Please discuss with provider.        CONTINUE these medications which have CHANGED    Details   amLODIPine (NORVASC) 10 MG tablet Take 1 tablet (10 mg) by mouth daily  Qty: 30 tablet, Refills: 0    Associated Diagnoses: Benign essential hypertension      amphetamine-dextroamphetamine (ADDERALL XR) 25 MG 24 hr capsule Take 1 capsule (25 mg) by mouth daily  Qty: 30 capsule, Refills: 0    Associated Diagnoses: Attention deficit hyperactivity disorder (ADHD), combined type      busPIRone (BUSPAR) 15 MG tablet Take 1 tablet (15 mg) by mouth 3 times daily  Qty: 90 tablet, Refills: 0    Associated Diagnoses: Schizoaffective disorder, chronic condition with acute exacerbation (H)      clonazePAM (KLONOPIN) 0.5 MG tablet Take 1 tablet (0.5 mg) by mouth daily  Qty: 30 tablet, Refills: 0    Associated Diagnoses: Borderline personality disorder (H)      doxycycline monohydrate (MONODOX) 100 MG capsule Take 1 capsule (100 mg) by mouth 2 times daily  Qty: 60 capsule, Refills: 0    Associated Diagnoses: Acne vulgaris      gabapentin  (NEURONTIN) 600 MG tablet Take 2 tablets (1,200 mg) by mouth 3 times daily  Qty: 180 tablet, Refills: 0    Associated Diagnoses: DDD (degenerative disc disease), lumbar; Chronic left-sided low back pain with left-sided sciatica      insulin glargine (LANTUS PEN) 100 UNIT/ML pen Inject 23 Units Subcutaneous every morning (before breakfast) Take 15 units daily. Take half dose, 7 units, on days you do not eat  Qty: 15 mL, Refills: 0    Comments: If Lantus is not covered by insurance, may substitute Basaglar or Semglee or other insulin glargine product per insurance preference at same dose and frequency.    Associated Diagnoses: Type 2 diabetes mellitus with hyperglycemia, without long-term current use of insulin (H)      omeprazole (PRILOSEC) 20 MG DR capsule Take 1 capsule (20 mg) by mouth daily  Qty: 30 capsule, Refills: 0    Associated Diagnoses: Gastroesophageal reflux disease without esophagitis      ondansetron (ZOFRAN ODT) 4 MG ODT tab Take 1 tablet (4 mg) by mouth daily as needed for nausea  Qty: 30 tablet, Refills: 0    Associated Diagnoses: Nausea and vomiting, unspecified vomiting type      QUEtiapine (SEROQUEL) 100 MG tablet Take 5 tablets (500 mg) by mouth At Bedtime  Qty: 150 tablet, Refills: 0    Associated Diagnoses: Schizoaffective disorder, bipolar type (H); Anxiety      tretinoin (RETIN-A) 0.05 % external cream Apply topically At Bedtime Pea-sized amount to whole face  Qty: 45 g, Refills: 0    Associated Diagnoses: Acne vulgaris           CONTINUE these medications which have NOT CHANGED    Details   !! melatonin 5 MG tablet Take 1 tablet (5 mg) by mouth At Bedtime  Qty: 30 tablet, Refills: 0    Associated Diagnoses: Schizoaffective disorder, chronic condition with acute exacerbation (H)      Semaglutide, 1 MG/DOSE, (OZEMPIC) 4 MG/3ML pen Inject 1 mg Subcutaneous every 7 days  Qty: 3 mL, Refills: 1    Associated Diagnoses: Type 2 diabetes mellitus with hyperglycemia, without long-term current use of  insulin (H)      testosterone (ANDROGEL 1.62 % PUMP) 20.25 MG/ACT gel Place 2 Pump onto the skin daily for 30 days Apply from dispenser to clean, dry, intact skin of the upper arms and shoulders.  Qty: 75 g, Refills: 3    Comments: 1 pump = 1.25 g gel = 20.25 mg testosterone  Associated Diagnoses: Gender dysphoria      ACE/ARB/ARNI NOT PRESCRIBED (INTENTIONAL) Please choose reason not prescribed from choices below.    Associated Diagnoses: Type 2 diabetes mellitus with hyperglycemia, without long-term current use of insulin (H)      blood glucose (NO BRAND SPECIFIED) test strip Use to test blood sugar one times daily and as needed. To accompany: Blood Glucose Monitor Brands: per insurance.  Qty: 100 strip, Refills: 3    Associated Diagnoses: Type 2 diabetes mellitus with hyperglycemia, without long-term current use of insulin (H)      Continuous Blood Gluc  (FREESTYLE COLTEN 2 READER) ZEINAB Use to read blood sugars as per 's instructions.  Qty: 1 each, Refills: 0    Associated Diagnoses: Type 2 diabetes mellitus with hyperglycemia, without long-term current use of insulin (H)      Continuous Blood Gluc Sensor (FREESTYLE COLTEN 2 SENSOR) MISC Use to read blood sugars per 's instructions. Change sensor every 14 days.  Qty: 6 each, Refills: 0    Associated Diagnoses: Type 2 diabetes mellitus with hyperglycemia, without long-term current use of insulin (H)      thin (NO BRAND SPECIFIED) lancets Use with lanceting device to check blood sugars once per day. To accompany: Blood Glucose Monitor Brands: per insurance.  Qty: 100 each, Refills: 3    Associated Diagnoses: Type 2 diabetes mellitus with hyperglycemia, without long-term current use of insulin (H)       !! - Potential duplicate medications found. Please discuss with provider.                  Psychiatric Examination:   Appearance:  awake, alert, adequately groomed, and dressed in hospital scrubs  Attitude:  cooperative  Eye Contact:   "good  Mood:  better, \"excited\" for discharge  Affect:  mood congruent, brighter, smiled a couple of times, and constricted mobility  Speech:  clear, coherent  Psychomotor Behavior:  no evidence of tardive dyskinesia, dystonia, or tics  Thought Process:  logical, linear, and goal oriented  Associations:  no loose associations  Thought Content:  no evidence of suicidal ideation or homicidal ideation and no evidence of psychotic thought, though reporting longstanding AH (present daily since age 18 per patient)  Insight:  fair  Judgment:  intact  Oriented to:  time, person, and place  Attention Span and Concentration:  intact  Recent and Remote Memory:  intact  Language: Able to name objects, Able to repeat phrases, and Able to read and write  Fund of Knowledge: appropriate  Muscle Strength and Tone: normal  Gait and Station: Normal         Discharge Plan:   Summary: You were admitted on 8/10/2023  due to Schizoaffective disorder and Suicidal Ideations.  ou were treated by Dr. Tessa Mendoza and discharged on 8/28/23 [facility]     Main Diagnosis: Schizoaffective disorder, bipolar type, Borderline Personality Disorder, Schizoaffective disorder,     Commitment:  You were dually committed to the [facility] and the Commissioner of Human Services on 8/14/23 and you are being discharged on a Provisional Discharge Agreement which shall remain in effect for the duration of the Commitment which expires on 2/14/24.  You are also court ordered to take the medications that the doctor ordered for you.      Health Care Follow-up:      Regine Last CNP, APRN-In Office appointment 9/26/2023 12:30 pm  [facility] [address], [place] [address]  Telephone: [phone]     [facility]-has sent you a portal email link. All information needs to be submitted in the portal by Friday September 1, 2023. [facility] " scheduled 9/6/23 12pm-1pm in person. [address], [place] [address]  [phone] fax#[phone]     Information will be faxed to your outpatient providers to ensure a healthy continuity of care for you.      Attend all scheduled appointments with your outpatient providers. Call at least 24 hours in advance if you need to reschedule an appointment to ensure continued access to your outpatient providers.      Referral Made:  [employer] confirmed receipt of referral and will reach out to you: If you need to contact the program Phone [phone]   Email: Katiuska Miramontes;   [email or web address]     [facility] offers several treatment programs for adults with significant mental health challenges, including Adult Day Treatment and Group Therapy. [facility] currently offers Adult Day Treatment Programs at its [facility].  Adult Mental Health Day Treatment     In some cases for those with significant mental health challenges, a period of intensive daily treatment is necessary to bring about meaningful change. Adult Day Treatment focuses both on the process of recovery as well as on the development of skills clients can use to cope with mental health symptoms. We accept Medicare as well as other insurance and payment methods for these programs.     Adult Day Treatment Groups  [facility] runs a series of groups based on client needs: Connections Day Treatment - 12-week program w/ recommendation to attend aftercare support groups 1-2 times for 4 months. This treatment focuses on teaching skills to cope with mental health symptoms and lifestyle changes to support wellness. Illness management and recovery curriculum is used as well as support and validation. The program utilizes cognitive behavioral therapy, dialectical behavioral therapy, psycho education, and expressive therapies to teach and practice coping skills and wellness/lifestyles changes for  managing impact of mental illness. Dialectical Behavior Therapy (DBT) Adult Day Treatment - 20-week program w/ recommendation to attend aftercare support groups 1-2 times for 4 months. DBT is an evidence-based comprehensive cognitive-behavioral treatment that emphasizes mindfulness, emotion regulation, interpersonal effectiveness and distress tolerance. DBT is helpful for clients struggling with suicidal thoughts or other self-damaging behavior Following graduation from our Connections or DBT Day Treatment program, all clients are encouraged to attend IOP aftercare groups. [facility] offers a variety of adult IOP groups multiple days per week in 60 to 90 minutes sessions for mental health challenges. There are 8 to 12 people in a group at a time.      Please note: You do not have to complete Day Treatment to join IOP groups. PACE - This program meets once per week for 1 hour per day. This program best fits clients who are typically lower-functioning and benefits from a slower -paced program. This is NOT for the developmentally delayed, based on client s ability to process information, learn and retain skills. In this program you will learn DBT Skills and symptom management in a structured format. DBT Skills Aftercare - This program meets once per week for 1  hours. This program is best for adults who are completing a more intensive program schedule or those who struggle with ineffective decision making, emotional dysregulation, and tolerating distress. DBT Skills are reviewed and reinforced at each session. Group therapy is provided in a supportive, nonjudgmental environment. Some experience with DBT concepts is required. This is ideal for aftercare to DBT Adherent or DBT Day Treatment. Groups are either in-person or hybrid (both in person and virtual). Symptom Management - This group meets once per week for 1 or 1   hour (s) per day. This is a support-based group for client s struggling with mental health  symptoms and co-occurring disorders. This group is focused on sharing experiences, problem solving, review of coping skills and general support. This group would benefit clients who are looking for more general peer support and review of skills learned in Day Treatment. Download an Adult Day Treatment/Group Therapy referral form. Adherent Adult DBT Skills Training - A group for clients aged 18 and older who struggle with suicidal ideation, self-harm, impulsive behaviors and/or frequent hospitalizations.  We support clients to learn skills to help them maintain personal safety, reduce problematic behaviors and improve their relationships.  This is an evidence based practice that includes group, individual therapy, and  coaching calls to support clients to make lasting changes.     For more information about the [facility]herent Adult DBT Skills Training Program, please call [phone].  Referrals and Appointments        Major Treatments, Procedures and Findings:  You were provided with: a psychiatric assessment, assessed for medical stability, medication evaluation and/or management, group therapy, individual therapy, CD evaluation/assessment, milieu management, and medical interventions     Symptoms to Report: feeling more aggressive, increased confusion, losing more sleep, mood getting worse, or thoughts of suicide     Early warning signs can include: increased depression or anxiety sleep disturbances increased thoughts or behaviors of suicide or self-harm      Safety and Wellness:  Take all medicines as directed.  Make no changes unless your doctor suggests them.      Follow treatment recommendations.  Refrain from alcohol and non-prescribed drugs.  Ask your support system to help you reduce your access to items that could harm yourself or others. Items could include:  Firearms  Medicines (both prescribed and over-the-counter)  Knives and other sharp objects  Ropes and like materials  Car keys  If there is a concern  "for safety, call 911. If there is a concern for safety, call 911.     Resources:   Crisis Intervention: 316.292.5686 or 243-352-9054 (TTY: 202.155.4779).  Call anytime for help.  National [place] on Mental Illness (www.mn.jhonatan.org): 787.488.6240 or 407-656-5020.  Suicide Awareness Voices of Education (SAVE) (www.save.org): 963-187-EASV (0847)  National Suicide Prevention Line (www.mentalhealthmn.org): 825-632-OYDH (4228)  Mental Health Consumer/Survivor Network of [place] (www.mhcsn.net): 385.909.9037 or 544-418-4949  Mental Health Association of [place] (www.mentalhealth.org): 933.770.7333 or 496-456-1023  Self- Management and Recovery Training., Empressr-- Toll free: 517.578.9718  www.Prestodiag.ISC8  [facility] Crisis (COPE) Response - Adult 740 165-8841  Text 4 Life: txt \"LIFE\" to 30178 for immediate support and crisis intervention  Crisis text line: Text \"[place]\" to 976377. Free, confidential, 24/7.  Crisis Intervention: 900.452.2344 or 940-035-0453. Call anytime for help.   The Manuel Project: A support network for LGBTQ youth providing crisis intervention and suicide prevention, 24/7 by phone (call 1-724.838.3495), text (text \"START\" to 647862), or instant message (https://www.theVarada Innovationsvorproject.org/get-help/).        [facility] ([place] on Mental Illness) improves the lives of children and adults with mental illnesses and their families by providing free classes on mental illnesses and support groups for adults with mental illnesses, parents and family members. For more information: Phone: 484.491.4204 Toll free: 1-103-PPZZ-HELPS Website: www.namihelps.orghttp://www.namihelps.org/        General Medication Instructions:   See your medication sheet(s) for instructions.   Take all medicines as directed.  Make no changes unless your doctor suggests them.   Go to all your doctor visits.  Be sure to have all your required lab tests. This way, your medicines can be refilled on time.  Do not use any drugs not " prescribed by your doctor.  Avoid alcohol.     Advance Directives:   Scanned document on file with [place]? No scanned doc  Is document scanned? Pt states no documents  Honoring Choices Your Rights Handout: Informed and given  Was more information offered? Pt declined     The Treatment team has appreciated the opportunity to work with you. If you have any questions or concerns about your recent admission, you can contact the unit which can receive your call 24 hours a day, 7 days a week. They will be able to get in touch with a Provider if needed. The unit number is [phone] .     Attestation:  The patient has been seen and evaluated by me,  Tessa Mendoza MD    > 80 minutes total time that was spent and over 50% of this time was spent in counseling and coordination of care with staff, reviewing medical record, educating patient about treatment options, side effects and benefits and alternative treatments for medications, providing supportive therapy and redirection regarding above symptoms.     This document is created with the help of Dragon dictation system.  All grammatical/typing errors or context distortion are unintentional and inherent to software.

## 2023-08-28 NOTE — PLAN OF CARE
"Pt was seen sitting in the lounge around 1100. Pt reports having passive thoughts of SI. Endorses AH but denies all other psych symptoms. Pt is cooperative and pleasant. He was seen making arts and crafts in the OT room. Has a discharge order placed, and is currently focused on waiting for medications to arrive so that he can leave. Pt contracts for safety.     BP (!) 138/90 (Patient Position: Sitting, Cuff Size: Adult Large)   Pulse 99   Temp 97.5  F (36.4  C) (Oral)   Resp 16   Ht 1.676 m (5' 6\")   Wt 109.5 kg (241 lb 4.8 oz)   SpO2 95%   BMI 38.95 kg/m      Problem: Adult Behavioral Health Plan of Care  Goal: Optimized Coping Skills in Response to Life Stressors  Outcome: Progressing  "

## 2023-08-28 NOTE — PLAN OF CARE
Goal Outcome Evaluation: Referral Follow Up    Care Coordination Note    VAL's for [facility] and sent referral (8/24) for IOP/DB .    [address] [address]  P: [phone] F: [phone]    8/25/23 Status: Referral received. Transferred to / : [facility]. Left [facility]IS.  -----------------------------------------------------------------------  8/28/2023  Status: Spoke with Isaias: All documents received. CH/MH is on hold- Prakash has been placed on wait-list. Isaias e-mailed counselor for next intake availability .    -Lucie Fagan  Adult Behavioral Health Care Coordinator

## 2023-08-28 NOTE — PROGRESS NOTES
Prakash awake at 0115 with BG of 178. HS BG was 162.  States having a headache now but declines any prn meds.  Declines any prn meds for sleep also.    Sleeping again now. Lithium level today.    Zyprexa 10 mg given at 0330 for agitation related to another patient in the lounge laughing loudly and talking to self.  Again declines any prns for headache.    At 0520 this morning, Prakash remains awake and is asking for Excedrin.  Given and rates his headache pain an 8.  Has had 5 hours sleep this night shift and remains on 1:1 staffing at this time.    Zofran and nicorette given at 0630.  In the lounge now watching TV quietly.

## 2023-08-28 NOTE — PLAN OF CARE
Assessment/Intervention/Current Symtoms and Care Coordination:  Chart review and met with team, discussed pt progress, symptomology, and response to treatment. Discussed the discharge plan and any potential impediments to discharge. Lithium being titrated to reach therapeutic level. Patient is being encouraged to trial other treatment medications before ECT is consulted.      -Writer left message for CM Cristy regarding discharge home today. Writer sent PD signed by pt over to Cristy. Writer received message from pt's therapist Joshua @[facility] that she has some availability Wednesday/Thursday this week. Writer spoke with Domenica @[facility] and they have sent portal information to pt's email to be completed by Friday 9/1. Writer discussed with pt and he will complete pw as they have appt. On 9/6/23 12pm-1pm in person. Pt completed pw with Magdy @[facility] confirmed receipt. Pt awaiting medications for discharge home. Writer contacted PCP clinically and got pt scheduled for follow up care with Narcisa Arreaga on 9/1/23 @1pm.     Pt reports he will not be going on the family trip as it had been canceled and he has received his money back. Pt's CM had concerns pt would skip mental health appointments to attend his family trip.        Discharge Plan or Goal  Back to group [facility]     Barriers to Discharge:  Symptom and medication stabilization     Referral Status:  DBT-PTSD  [facility] has openings starting 9/2023  [facility]-decline they are out of network with pt's   [facility] (DBT)     Legal Status:  Committed   County: [facility]   File Number: [id number]   Start and expiration date of commitment: 8/14/23-2/14/24     Contacts:  : Cristy at [facility], [phone](VAL signed)   [facility]: Deena at [facility], [phone] (VAL signed)   [facility] Director and Nurse: Domenica, [phone] (VAL signed)   CADI worker: Pretty Guzman at  [facility], [phone] (VAL signed)   Psychotherapist: Joshua at [facility], [phone] (VAL signed)       Upcoming Meetings and Dates/Important Information and next steps:

## 2023-08-28 NOTE — PROGRESS NOTES
"Brief Medicine Follow Up Note    Following up regarding HSV testing.     Today's vital signs, medications, and nursing notes were reviewed. Labs reviewed most recent serum and urine laboratories.     BP (!) 138/90 (Patient Position: Sitting, Cuff Size: Adult Large)   Pulse 99   Temp 97.5  F (36.4  C) (Oral)   Resp 16   Ht 1.676 m (5' 6\")   Wt 109.5 kg (241 lb 4.8 oz)   SpO2 95%   BMI 38.95 kg/m      A/P:  Vaginal Lesion  Per colleague's note on 8/27 (Ashlyn Linton, APRN CNP), pt had reported a lesion on the R side of the vulva, adjacent to the groin, which had been open, and actively draining a serous fluid (RN this AM also reports some blood). 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 This does not differentiate between an active vs resolved infection. Thus, plan as below.  - Given ongoing active symptoms, d/w RN to swab the area and send for HSV-1 and HSV-2 PCR  - Will initiate treatment w/ Valtrex 1g BID x7 days in the meantime for presumed HSV outbreak   - If HSV PCR negative, will discontinue and reevaluate lesion   - If positive, will continue Valtrex as scheduled  _____________________________________    ADDENDUM 8/28/2023 11:35 AM:  Medicine notified later this AM that patient will be discharging to his group home today. Thus, in light of above concern for HSV, and discharging today, will not have time to complete workup, so will treat for presumed HSV infection w/ Valtrex 1g BID x7 days. Would recommend close follow-up w/ PCP after this.    Medicine will sign off. No further recommendations at this time.  Please feel free to reconsult if any new medical issues or concerns.  Thank you for the opportunity to care for this patient.     Pool Mclean PA-C  [facility]  Contact information available via [facility] Paging/Directory    "

## 2023-08-28 NOTE — PROGRESS NOTES
Prior Authorization **INITIATED**    Medication: TRETINOIN 0.05 % EX CREA  Insurance Company: [employer] Part D - Phone [phone] Fax [phone]  Pharmacy Filling the Rx: [place], [place] - [address]  Filling Pharmacy Phone: [phone]  Filling Pharmacy Fax: [phone]  Start Date: 8/28/2023  Reference #: CoverMyMeds Key: [id number] - PA Case ID: [id number]  Comments:  -      Bisi Ford CPhT  [place] [facility] Liaison  Pronouns: She/Her/Hers    [facility]  [address]  [address], [place] [address]   [email or web address]  [email or web address]   Phone: [phone]  Pager: [phone]  Fax: [phone]

## 2023-08-28 NOTE — PLAN OF CARE
"  Goal: Patient-Specific Goal (Individualized)  Outcome: Adequate for Care Transition  Goal: Absence of Hospital-Acquired Illness or Injury  Outcome: Adequate for Care Transition   thoughts/feelings acknowledged  Goal: Readiness for Transition of Care  Outcome: Adequate for Care Transition       Upon the start of the eleazar shift, pt is calm and cooperative and is excited to leave the hospital. Pt is discharging to a group home. Upon check in. Pt stated he is looking forward to the weather. Pt stated voices has gotten better since admission, and stated the suicidal thoughts have also been resolved. Pt looks tidy and well rested. Pt Denied SI/SIB/HI. Pt also denied other mental health symptoms including depression, anxiety and paranoia. Pt also denied having access to a gun. Upon getting ready to leave writer observed that pt is ready to leave and content with the plan of care provided by [facility].  No mental health concerns upon discharge. Pt stated he will be safe to himself and others after discharge. Pt was given a copy of his EKG results so it can be given to his outpatient psychiatric provider Dr Weiner. Pt was seen by writer walking into a cab at 5:16pm. No medication side effects observed or noted. No medical concerns this shift.     BP (!) 138/90 (Patient Position: Sitting, Cuff Size: Adult Large)   Pulse 99   Temp 97.5  F (36.4  C) (Oral)   Resp 16   Ht 1.676 m (5' 6\")   Wt 109.5 kg (241 lb 4.8 oz)   SpO2 95%   BMI 38.95 kg/m               "

## 2023-08-28 NOTE — CARE PLAN
08/27/23 2100   Group Therapy Session   Group Attendance attended group session   Time Session Began 1615   Time Session Ended 1700   Total Time (minutes) 45   Total # Attendees 4   Group Type expressive therapy   Group Topic Covered emotions/expression   Patient Response/Contribution cooperative with task     Art Therapy directive is to draw an image of a safe place and to identify five items within safe place that represent each of the five senses.   Goals of directive: emotional expression, emotional regulation, trauma containment.  Pt was an engaged, active participant for the full duration of group. Pt finished artwork and briefly shared with group. Pt duane a detailed drawing of [place] where his parents own property. Pt described in detail the calming environment and sensory details of this safe place for pt. Pt's mood was calm. Pt was a polite and pleasant participant.

## 2023-08-29 ENCOUNTER — TELEPHONE (OUTPATIENT)
Dept: PSYCHIATRY | Facility: CLINIC | Age: 33
End: 2023-08-29
Payer: MEDICARE

## 2023-08-29 ENCOUNTER — PATIENT OUTREACH (OUTPATIENT)
Dept: CARE COORDINATION | Facility: CLINIC | Age: 33
End: 2023-08-29
Payer: MEDICARE

## 2023-08-29 DIAGNOSIS — F60.3 BORDERLINE PERSONALITY DISORDER (H): ICD-10-CM

## 2023-08-29 DIAGNOSIS — F25.9 SCHIZOAFFECTIVE DISORDER, CHRONIC CONDITION WITH ACUTE EXACERBATION (H): ICD-10-CM

## 2023-08-29 LAB
ATRIAL RATE - MUSE: 99 BPM
DIASTOLIC BLOOD PRESSURE - MUSE: NORMAL MMHG
INTERPRETATION ECG - MUSE: NORMAL
P AXIS - MUSE: 57 DEGREES
PR INTERVAL - MUSE: 170 MS
QRS DURATION - MUSE: 92 MS
QT - MUSE: 376 MS
QTC - MUSE: 482 MS
R AXIS - MUSE: 14 DEGREES
SYSTOLIC BLOOD PRESSURE - MUSE: NORMAL MMHG
T AXIS - MUSE: 28 DEGREES
VENTRICULAR RATE- MUSE: 99 BPM

## 2023-08-29 RX ORDER — PHENOL 1.4 %
10 AEROSOL, SPRAY (ML) MUCOUS MEMBRANE EVERY EVENING
Qty: 30 TABLET | Refills: 0 | Status: SHIPPED | OUTPATIENT
Start: 2023-08-29 | End: 2023-10-11

## 2023-08-29 NOTE — PROGRESS NOTES
[facility]    Background: Transitional Care Management program identified per system criteria and reviewed by [facility] [place] team for possible outreach.    Assessment: Upon chart review, CCR Team member will not proceed with patient outreach related to this episode of Transitional Care Management program due to reason below:    Patient has active communication with a nurse, provider or care team for reason of post-hospital follow up plan.  Outreach call by CCRC team not indicated to minimize duplicative efforts.     Plan: Transitional Care Management episode addressed appropriately per reason noted above.      RAYMUNDO Chand  [facility]    *Connected Care Resource Team does NOT follow patient ongoing. Referrals are identified based on internal discharge reports and the outreach is to ensure patient has an understanding of their discharge instructions.

## 2023-08-29 NOTE — TELEPHONE ENCOUNTER
Attempted to reach pt for post-hospital check-in. No answer. LVM requesting a c/b.    Emily Gibbs RN on 8/29/2023 at 9:00 AM

## 2023-08-29 NOTE — TELEPHONE ENCOUNTER
"Pt returned writer's phone call.    [facility] Discharge Summary: Yes    Compare Pt Discharge summary to that in EPIC: Complete; taking medications as prescribed. Pt had questions regarding insulin dosing - referred pt to PCP.     Able to get meds filled: Yes, except for melatonin. In the hospital pt was taking 10mg of melatonin in the evening around 6pm to \"calm\" him down and 5mg at bedtime. Pt found this regimen to be effective and would like to continue    Mood: \"pretty okay today\" Yesterday after discharge pt was experiencing heightened anxiety and irritability. They note having had a verbal outburst towards a  at a store yesterday. They were able to take PRN Klonopin and calm down. Anxiety and mood are better today. Endorses passive SI without plan or intent. Continues to endorse AH - not command in nature. The voices usually comment on what the pt is doing - they do not tell him to hurt himself or others. Pt slept from 12a-9a, waking only once to use the restroom.     SE: Denies, noticeable improvement since initiation of Li    DC Plan: Discharged back to [facility] which is located in [place], [place].   Admit Date: 8/10/23  Discharge date: 8/29/23  Next appt: 9/6/23 with Regine   Referrals (therapy, daytx, homecare, ect): Starting DBT next week through [facility] in [place], notes that CM is working on setting up IOP.   Crisis Plan: Reviewed safety planning. Pt agrees to let roommate know if they are feeling unsafe. Pt knows to call 911 should acute safety concerns develop.     Contact Numbers Reviewed: Yes    TCM:  Direct contact made with pt within 2 business days? Yes  Pt is schedule in clinic within 14 days of discharge? Yes  Pt qualifies for TCM visit? Yes    Emily Gibbs RN on 8/29/2023 at 1:14 PM  "

## 2023-08-29 NOTE — TELEPHONE ENCOUNTER
[facility] Call Center    Phone Message    May a detailed message be left on voicemail: yes     Reason for Call: Other: Sutton called wanted to speak with a nurse she did not explain the reason with speaking with a nurse but stated would like Lucie to call josie back     Action Taken: Other: nurse pool    Travel Screening: Not Applicable

## 2023-08-29 NOTE — TELEPHONE ENCOUNTER
Returned phone call to pt. Pt unable to attend the appointment with Mattie on 9/6 because they have an intake for DBT.    They are unable to meet with mattie this Friday as they have an appointment for diabetes management that day during [facility]'s green space.     Will consult with provider regarding appointment on 9/7 (AYDIN only) and will contact pt back.

## 2023-08-30 ENCOUNTER — TELEPHONE (OUTPATIENT)
Dept: FAMILY MEDICINE | Facility: CLINIC | Age: 33
End: 2023-08-30
Payer: MEDICARE

## 2023-08-30 DIAGNOSIS — E11.65 TYPE 2 DIABETES MELLITUS WITH HYPERGLYCEMIA, WITHOUT LONG-TERM CURRENT USE OF INSULIN (H): Primary | ICD-10-CM

## 2023-08-30 NOTE — TELEPHONE ENCOUNTER
"Patient was discharged 8/28 without education of insulins. He states he tries to check blood sugars. He last checked yesterday. It was \"around 230's\". He has not taken insulin since his hospital discharge 8/28/23.     He stated he has 5 vials of Novolog. He is not sure how to use each. He does not know how much Lantus to use because there are two directions written on the script.     RN reviewed Hospital discharge notes. Does not give clear directions. See below:    START taking:  docusate sodium (COLACE)  empagliflozin (JARDIANCE)  Start taking on: August 29, 2023  insulin aspart (NovoLOG PEN)  This medication has multiple start dates; refer to the medication list  below for more details.  lithium (ESKALITH CR/LITHOBID)  metroNIDAZOLE (FLAGYL)  nicotine (NICODERM CQ)  Start taking on: August 29, 2023  nicotine polacrilex (NICORETTE)  polyethylene glycol (MIRALAX)  trifluoperazine (STELAZINE)  valACYclovir (VALTREX)  witch hazel-glycerin (TUCKS)  CHANGE how you take:  amphetamine-dextroamphetamine (Adderall XR)  busPIRone (BUSPAR)  insulin glargine (LANTUS PEN)  melatonin  QUEtiapine (SEROquel)  Review your updated medication list below.  AFTER VISIT SUMMARY     ......      Disp Refills Start End TALHA    insulin glargine (LANTUS PEN) 100 UNIT/ML pen 15 mL 0 8/28/2023  No   Sig - Route: Inject 23 Units Subcutaneous every morning (before breakfast) Take 15 units daily. Take half dose, 7 units, on days you do not eat - Subcutaneous       RN does not see sliding scale on file for patient to take Novolog insulin. There are several medications on file that reflect different directions.     RN routing to provider to advise on how to use insulins.     Patient is scheduled for a Hospital follow up on 9/1/23. RN reviewed rony schedules, no available appointments today.     Neeru Muro RN on 8/30/2023 at 2:28 PM        "

## 2023-08-30 NOTE — TELEPHONE ENCOUNTER
Referral placed to diabetic education to assist with insulin and medication adjustment.  Do recommend continuous glucose monitor-I do not think he has been using it.  NovoLog 4 units with meals.  Lantus lets change the dose to 20 units daily.    Becki Avery NP-C

## 2023-08-30 NOTE — TELEPHONE ENCOUNTER
RN went through provider directions. He verbalized understanding. He requested this be sent in a [facility] message.     RN placed myChart with provider directives and diabetic Ed phone number.       Neeru Muro RN on 8/30/2023 at 4:24 PM

## 2023-09-01 ENCOUNTER — VIRTUAL VISIT (OUTPATIENT)
Dept: FAMILY MEDICINE | Facility: CLINIC | Age: 33
End: 2023-09-01
Payer: MEDICARE

## 2023-09-01 DIAGNOSIS — F33.1 MODERATE EPISODE OF RECURRENT MAJOR DEPRESSIVE DISORDER (H): ICD-10-CM

## 2023-09-01 DIAGNOSIS — Z09 HOSPITAL DISCHARGE FOLLOW-UP: Primary | ICD-10-CM

## 2023-09-01 DIAGNOSIS — B96.89 BACTERIAL VAGINOSIS: ICD-10-CM

## 2023-09-01 DIAGNOSIS — N76.0 BACTERIAL VAGINOSIS: ICD-10-CM

## 2023-09-01 DIAGNOSIS — E11.65 TYPE 2 DIABETES MELLITUS WITH HYPERGLYCEMIA, WITHOUT LONG-TERM CURRENT USE OF INSULIN (H): ICD-10-CM

## 2023-09-01 DIAGNOSIS — R51.9 NONINTRACTABLE HEADACHE, UNSPECIFIED CHRONICITY PATTERN, UNSPECIFIED HEADACHE TYPE: ICD-10-CM

## 2023-09-01 DIAGNOSIS — R19.7 DIARRHEA, UNSPECIFIED TYPE: ICD-10-CM

## 2023-09-01 DIAGNOSIS — B00.9 HSV (HERPES SIMPLEX VIRUS) INFECTION: ICD-10-CM

## 2023-09-01 PROCEDURE — 99214 OFFICE O/P EST MOD 30 MIN: CPT | Mod: VID | Performed by: PHYSICIAN ASSISTANT

## 2023-09-01 ASSESSMENT — ENCOUNTER SYMPTOMS
SORE THROAT: 0
ABDOMINAL PAIN: 0
CHILLS: 0
NAUSEA: 1
DIARRHEA: 1
FEVER: 0
SHORTNESS OF BREATH: 0
VOMITING: 0
COUGH: 0
RHINORRHEA: 1
HEADACHES: 1
LIGHT-HEADEDNESS: 0
NERVOUS/ANXIOUS: 0

## 2023-09-01 ASSESSMENT — PATIENT HEALTH QUESTIONNAIRE - PHQ9
10. IF YOU CHECKED OFF ANY PROBLEMS, HOW DIFFICULT HAVE THESE PROBLEMS MADE IT FOR YOU TO DO YOUR WORK, TAKE CARE OF THINGS AT HOME, OR GET ALONG WITH OTHER PEOPLE: VERY DIFFICULT
SUM OF ALL RESPONSES TO PHQ QUESTIONS 1-9: 9
SUM OF ALL RESPONSES TO PHQ QUESTIONS 1-9: 9

## 2023-09-01 NOTE — PATIENT INSTRUCTIONS
I am glad to hear you are feeling better.  Please continue your medications for depression as prescribed.  Additionally, continue with your psychiatry follow-up as planned.  I have listed crisis information below in case you are ever need of talking to someone urgently.  In the future, if you have any plans for self-harm, please call 911.    The diarrhea may be related to antibiotics, and bacterial diarrhea, a virus, or electrolyte changes.  We are completing lab work for further evaluation.  Please make sure to call the [facility] at [phone] to set up your lab appointment.  You can also set this up via [employer].    Continue to take your insulin as planned with Becki, your primary care provider.  Make sure to check your blood glucose once daily and keep a log to review at your next visit with Becki.    As you are not currently having a herpes outbreak, you can discontinue use of valacyclovir.    As you are not experiencing symptoms of bacterial vaginosis, you may discontinue metronidazole/Flagyl.      Please reach out with any questions or concerns.  Follow-up sooner for any new or worsening symptoms.    In an emergency--call 911  Don't leave the person alone. Anyone who is at imminent risk of suicide needs psychiatric services right away. The person must be constantly watched, and never left out of sight. Call 911 or a 24-hour suicide crisis hotline. You can search for this online. You can also take the person to the nearest hospital emergency room.     Don't keep it a secret and don't wait  Call a mental health clinic or a licensed mental health professional in your area right away. This may be a psychiatrist, clinical psychologist, psychiatric or licensed clinical , marriage and family counselor, or clergy. If you don't know how to contact such professionals and there is an immediate risk, call 911. Tell them you need help for a person who is thinking about suicide.     Resources  National  Suicide Prevention Bbkoewan375-270-9430 (506-667-QZLD)www.suicidepreventionlifeline.org  National Suicide Vgfaney953-378-0068 (800-SUICIDE)  National [place] of Mental Szuxym391-025-2704jrh.[facility].nih.gov  National [place] on Mental Wluvqog006-135-0288bmy.jhonatan.org  Mental Health Qislxqu444-489-2408npx.[facility].org

## 2023-09-01 NOTE — PROGRESS NOTES
Prakash is a 33 year old who is being evaluated via a billable video visit.      How would you like to obtain your AVS? MyChart  If the video visit is dropped, the invitation should be resent by: Text to cell phone: [phone]  Will anyone else be joining your video visit? No      Assessment & Plan     Hospital discharge follow-up  Moderate episode of recurrent major depressive disorder (H)  Patient is a 33-year-old who presents to video visit for hospital discharge follow-up.  Patient was hospitalized with suicidal ideation.  Today, patient notes feeling significantly better.  He states he is no longer experiencing suicidal ideation and feels that medications are working well to control depression.  Patient has follow-up with psychiatry in 2 weeks.  He has no longer in therapy and declines referral for this at this current time.  He does have follow-up scheduled with primary care provider.     Diarrhea, unspecified type  Nonintractable headache, unspecified chronicity pattern, unspecified headache type  Patient notes that he was recently on antibiotics during hospitalization and was discharged on antibiotics.  He has developed frequent diarrhea as well as an ongoing headache and does have a history of C. difficile.  He denies other symptoms including fever as well as abdominal pain, thus low suspicion for acute abdomen.  Discussed that symptoms may be from medication changes, may be viral given recent COVID-19 exposure, or could be from changes in electrolytes or from C. difficile.  Will complete C. difficile testing as well as electrolyte testing. recommended repeating COVID-19 testing.  Low suspicion that is caused by diabetes as blood glucose has been less than 200 and patient is compliant with insulin.  Return/follow-up precautions provided.  - C. difficile Toxin B PCR with reflex to C. difficile Antigen and Toxins A/B EIA; Future  - Basic metabolic panel  (Ca, Cl, CO2, Creat, Gluc, K, Na, BUN); Future    Type  2 diabetes mellitus with hyperglycemia, without long-term current use of insulin (H)  Patient has been compliant with diabetes medications as prescribed.  Blood glucose less than 200. Recommended checking this once daily and bringing log to visit with PCP for review.       HSV (herpes simplex virus) infection  Patient notes he was unaware of HSV infection prior to hospital stay, but has had cold sores throughout life.  He was discharged on valacyclovir and is unsure why he is on this medication as he does not currently have an outbreak.  Recommended discontinuing valacyclovir.    Bacterial vaginosis  Patient notes he is also on a new medication metronidazole/Flagyl and is unsure of why.  Testing resulted positive for bacterial vaginosis.  Patient denies any discharge, itching, discomfort in the area.  Recommended discontinuing medication.         MED REC REQUIRED  Post Medication Reconciliation Status:  Discharge medications reconciled and changed, see notes/orders  Depression Screening Follow Up        9/1/2023    11:59 AM   PHQ   PHQ-9 Total Score 9   Q9: Thoughts of better off dead/self-harm past 2 weeks Nearly every day   F/U: Thoughts of suicide or self-harm Yes   F/U: Self harm-plan Yes   F/U: Self-harm action Yes   F/U: Safety concerns No   Notes: This was how patient felt prior to hospitalization    See Patient Instructions    Narcisa Arreaga PA-C  [facility] SHAILESH Randall is a 33 year old, presenting for the following health issues:  Hospital F/U (Patient was seen at the [facility] on 08/10/2023- 08/28/2023)      9/1/2023     1:07 PM   Additional Questions   Roomed by Deena MUJICA MA   Accompanied by No one         9/1/2023     1:07 PM   Patient Reported Additional Medications   Patient reports taking the following new medications None       HPI       Hospital Follow-up Visit:    Hospital/Nursing Home/IP Rehab Facility: [facility]  Date of  Admission: 08/10/2023  Date of Discharge: 08/28/2023  Reason(s) for Admission: Mental Health    Was your hospitalization related to COVID-19? No   Problems taking medications regularly:  None  Medication changes since discharge: None  Problems adhering to non-medication therapy:  None    Summary of hospitalization:  [facility] discharge summary reviewed  Diagnostic Tests/Treatments reviewed.  Follow up needed: PCP, psychiatry, counseling  Other Healthcare Providers Involved in Patient s Care:         None  Update since discharge: improved. Patient notes that since leaving hospital he no longer has SI. Generally he is feeling unwell due to diarrhea which can occur every hour. He stopped the Miralax. He also has been experiencing headaches that do improve with Excedrin. He was worried this was from DM, but BG was 179 today. He has not been checking BG as often. Patient is using 20 U Lantis in the morning and 4 Units Novolog with meals. Patient has been on antibiotics recently. C. Diff in the past. Patient has psychiatry follow up in 2 weeks. Patient does not currently have a therapist, but does have an [employer] worker. Patient's two roommates has COVID19 last week, but patient tested negative last night. Patient is drinking plenty of fluids. He is eating regularly.     Patient notes he is on two new medications that he is unsure why he is on.   1: Flagyl: tested positive for BV. No symptoms. No discharge, discomfort  2: Valtrex: HSV1/2 testing positive. No rash currently. No cold sores.     Plan of care communicated with patient         Per chart review, patient admitted to hospital after presenting to the ED for suicidal ideation and trying to formulate plan.  Recent relapse on Adderall.  Adherence to all other medications.  Patient denies substance use except THC.  Medication changes include: Stop Adderall, increase Seroquel to 300 mg at bedtime.    Review of Systems   Constitutional:  Negative for  chills and fever.   HENT:  Positive for rhinorrhea. Negative for congestion and sore throat.    Respiratory:  Negative for cough and shortness of breath.    Cardiovascular:  Negative for chest pain.   Gastrointestinal:  Positive for diarrhea and nausea. Negative for abdominal pain and vomiting.   Skin:  Negative for rash.   Neurological:  Positive for headaches. Negative for light-headedness.   Psychiatric/Behavioral:  Negative for suicidal ideas. The patient is not nervous/anxious.             Objective         Vitals:  No vitals were obtained today due to virtual visit.    Physical Exam   GENERAL: Healthy, alert and no distress  EYES: Eyes grossly normal to inspection.  No discharge or erythema, or obvious scleral/conjunctival abnormalities.  RESP: No audible wheeze, cough, or visible cyanosis.  No visible retractions or increased work of breathing.    SKIN: Visible skin clear. No significant rash, abnormal pigmentation or lesions.  NEURO: Cranial nerves grossly intact.  Mentation and speech appropriate for age.  PSYCH: Mentation appears normal, affect normal/bright, judgement and insight intact, normal speech and appearance well-groomed.                Video-Visit Details    Type of service:  Video Visit   Video Start Time: 1:35 PM  Video End Time:2:05 PM    Originating Location (pt. Location): Home    Distant Location (provider location):  On-site  Platform used for Video Visit: [employer]ne[employer]

## 2023-09-14 ENCOUNTER — VIRTUAL VISIT (OUTPATIENT)
Dept: PSYCHIATRY | Facility: CLINIC | Age: 33
End: 2023-09-14
Attending: NURSE PRACTITIONER
Payer: MEDICARE

## 2023-09-14 ENCOUNTER — TELEPHONE (OUTPATIENT)
Dept: PSYCHIATRY | Facility: CLINIC | Age: 33
End: 2023-09-14
Payer: MEDICARE

## 2023-09-14 DIAGNOSIS — Z79.899 MEDICATION MANAGEMENT: ICD-10-CM

## 2023-09-14 DIAGNOSIS — F90.2 ATTENTION DEFICIT HYPERACTIVITY DISORDER (ADHD), COMBINED TYPE: ICD-10-CM

## 2023-09-14 DIAGNOSIS — F41.9 ANXIETY: ICD-10-CM

## 2023-09-14 DIAGNOSIS — F25.0 SCHIZOAFFECTIVE DISORDER, BIPOLAR TYPE (H): Primary | ICD-10-CM

## 2023-09-14 PROCEDURE — 99443 PR PHYSICIAN TELEPHONE EVALUATION 21-30 MIN: CPT | Mod: VID | Performed by: NURSE PRACTITIONER

## 2023-09-14 RX ORDER — BUSPIRONE HYDROCHLORIDE 15 MG/1
15 TABLET ORAL 3 TIMES DAILY
Qty: 90 TABLET | Refills: 1 | Status: SHIPPED | OUTPATIENT
Start: 2023-09-14 | End: 2023-09-26

## 2023-09-14 RX ORDER — LITHIUM CARBONATE 450 MG
900 TABLET, EXTENDED RELEASE ORAL AT BEDTIME
Qty: 60 TABLET | Refills: 1 | Status: SHIPPED | OUTPATIENT
Start: 2023-09-14 | End: 2023-10-11

## 2023-09-14 RX ORDER — TRIFLUOPERAZINE HYDROCHLORIDE 2 MG/1
2 TABLET, FILM COATED ORAL 2 TIMES DAILY
Qty: 60 TABLET | Refills: 1 | Status: SHIPPED | OUTPATIENT
Start: 2023-09-14 | End: 2023-09-26

## 2023-09-14 RX ORDER — DEXTROAMPHETAMINE SACCHARATE, AMPHETAMINE ASPARTATE MONOHYDRATE, DEXTROAMPHETAMINE SULFATE AND AMPHETAMINE SULFATE 6.25; 6.25; 6.25; 6.25 MG/1; MG/1; MG/1; MG/1
25 CAPSULE, EXTENDED RELEASE ORAL DAILY
Qty: 30 CAPSULE | Refills: 0 | Status: SHIPPED | OUTPATIENT
Start: 2023-09-28 | End: 2023-09-27

## 2023-09-14 RX ORDER — QUETIAPINE FUMARATE 100 MG/1
500 TABLET, FILM COATED ORAL AT BEDTIME
Qty: 150 TABLET | Refills: 1 | Status: SHIPPED | OUTPATIENT
Start: 2023-09-14 | End: 2023-10-11

## 2023-09-14 RX ORDER — LITHIUM CARBONATE 150 MG/1
150 CAPSULE ORAL AT BEDTIME
Qty: 30 CAPSULE | Refills: 1 | Status: SHIPPED | OUTPATIENT
Start: 2023-09-14 | End: 2023-10-11

## 2023-09-14 RX ORDER — CLONAZEPAM 0.5 MG/1
0.5 TABLET ORAL DAILY
Qty: 30 TABLET | Refills: 0 | Status: SHIPPED | OUTPATIENT
Start: 2023-09-25 | End: 2023-09-26

## 2023-09-14 ASSESSMENT — PATIENT HEALTH QUESTIONNAIRE - PHQ9
SUM OF ALL RESPONSES TO PHQ QUESTIONS 1-9: 13
SUM OF ALL RESPONSES TO PHQ QUESTIONS 1-9: 13
10. IF YOU CHECKED OFF ANY PROBLEMS, HOW DIFFICULT HAVE THESE PROBLEMS MADE IT FOR YOU TO DO YOUR WORK, TAKE CARE OF THINGS AT HOME, OR GET ALONG WITH OTHER PEOPLE: VERY DIFFICULT

## 2023-09-14 ASSESSMENT — PAIN SCALES - GENERAL: PAINLEVEL: NO PAIN (0)

## 2023-09-14 NOTE — TELEPHONE ENCOUNTER
Health Call Center    Phone Message    May a detailed message be left on voicemail: yes     Reason for Call: Medication Question or concern regarding medication   Prescription Clarification  Name of Medication: Lithium 150mg, & Lithium 450mg ER  Prescribing Provider: Regine Last   Pharmacy: [facility] [address]    What on the order needs clarification? The pharmacy called regarding the patient's prescription of Lithium 150mg & Lithium 450mg ER taken at bedtime. The pharmacy wanted to verify whether it was OK for these two prescriptions to have differing release rates.       Action Taken: Message routed to:  Other: [facility] psychiatry nurse pool    Travel Screening: Not Applicable
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
## 2023-09-14 NOTE — NURSING NOTE
Is the patient currently in the state of [place]? YES    Visit mode:VIDEO    If the visit is dropped, the patient can be reconnected by: VIDEO VISIT: Text to cell phone:   Telephone Information:   Mobile [phone]       Will anyone else be joining the visit? NO  (If patient encounters technical issues they should call [phone])    How would you like to obtain your AVS? MyChart    Are changes needed to the allergy or medication list? No    Patient will complete questionnaires via [email or web address]    Reason for visit: Recheck    Sydni VANN

## 2023-09-14 NOTE — PATIENT INSTRUCTIONS
-Increase Lithium to 1050 mg at bedtime (additional 150 mg) for your mood.  If having diarrhea or vomiting, please get lithium lab immediately. Otherwise, you can complete lithium lab with next visit when you come in person.  -Continue all other medication regimen for now.    Your next appointment is scheduled on 9/26/2023 (Tue) at 12:30pm. This is scheduled as in person visit.  Please arrive 15 minutes early for your appointment.    Thank you for coming to the [facility] HEALTH [facility].    Lab Testing:  If you had lab testing today and your results are reassuring or normal they will be mailed to you or sent through YouGov within 7 days. If the lab tests need quick action we will call you with the results. The phone number we will call with results is # [phone] (home) . If this is not the best number please call our clinic and change the number.    Medication Refills:  If you need any refills please call your pharmacy and they will contact us. Our fax number for refills is [phone]. Please allow three business for refill processing. If you need to  your refill at a new pharmacy, please contact the new pharmacy directly. The new pharmacy will help you get your medications transferred.     Scheduling:  If you have any concerns about today's visit or wish to schedule another appointment please call our office during normal business hours [phone] (8-5:00 M-F)    Contact Us:  Please call [phone] during business hours (8-5:00 M-F).  If after clinic hours, or on the weekend, please call  [phone].    Financial Assistance [phone]  [facility] Billing [phone]  Central Billing Office, [facility]: [phone]  [facility] Billing [phone]  Medical Records [phone]      MENTAL HEALTH CRISIS NUMBERS:  For a medical emergency please call  911 or go to the nearest ER.     [facility]:   [facility] -[phone]   Crisis Residence  [facility] -[phone]   Walk-In Counseling Center [facility] -[phone]   COPE 24/7 [facility] Mobile Team -[phone] (adults)/[phone] (child)  CHILD: [facility] needs assessment team - [phone]      [facility]:   [facility] - [phone]   Walk-in counseling [facility] - [phone]   Walk-in counseling [facility] - [phone]   Crisis Residence [facility] - [phone]  Urgent Care Adult Mental Sjdhcb-[phone] mobile unit/ 24/7 crisis line    National Crisis Numbers:   National Suicide Prevention Lifeline: 8-097-420-TALK (355-628-9841)  Poison Control Center - 1-894.730.6461  impok/resources for a list of additional resources (SOS)  Trans Lifeline a hotline for transgender people 1-865.454.5985  The Manuel Project a hotline for LGBT youth 0-282-768-3839  Crisis Text Line: For any crisis 24/7   To: 107024  see www.crisistextline.org  - IF MAKING A CALL FEELS TOO HARD, send a text!         Again thank you for choosing [facility] MENTAL HEALTH & ADDICTION [facility] and please let us know how we can best partner with you to improve you and your family's health.    You may be receiving a survey regarding this appointment. We would love to have your feedback, both positive and negative. The survey is done by an external company, so your answers are anonymous.

## 2023-09-19 NOTE — DISCHARGE INSTRUCTIONS
Follow up with your established providers    Take your medications as prescribed    Please keep klonopin (clonazepam) locked up and do not give more than the actual dose for each time they take it  
From an oropharyngeal standpoint, pt is deemed safe to resume a regular solid diet and thin liquids. However given aforementioned pt complaints / suspect esophageal component, suggest GI consult for further work up and defer PO diet pending GI clearance

## 2023-09-21 NOTE — PROGRESS NOTES
Subjective     Ayana Prasad is a 29 year old female who presents to clinic today for the following health issues:    HPI       Chief Complaint   Patient presents with     Back Pain     E visit initiated 10/7/19 was not appropriate. Advised office visit.        Current Outpatient Medications   Medication Sig Dispense Refill     fluPHENAZine (PROLIXIN) 1 MG tablet Take 1 tablet (1 mg) by mouth 2 times daily 60 tablet 0     gabapentin (NEURONTIN) 300 MG capsule Take 2 capsules (600 mg) by mouth 3 times daily 90 capsule 2     lithium ER (LITHOBID/ESKALITH CR) 300 MG CR tablet Take 1 tab, 300 mg, in the a.m. and 2 tabs, 600 mg in the p.m. 90 tablet 0     LORazepam (ATIVAN) 1 MG tablet Take 1 tablet (1 mg) by mouth 2 times daily as needed for anxiety To be administered by wife Sergio 30 tablet 0     naproxen (NAPROSYN) 500 MG tablet Take 1 pill twice per day with food for 2 weeks then every 12 hours as needed 60 tablet 1     Amino Acids (AMINO ACID PO) Take 1 capsule by mouth daily Unknown formulation and dose.       nicotine (NICODERM CQ) 21 MG/24HR 24 hr patch Place 1 patch onto the skin daily (Patient not taking: Reported on 10/9/2019) 30 patch 1     nicotine polacrilex (NICORETTE) 4 MG gum Place 1 each (4 mg) inside cheek every hour as needed for other (nicotine withdrawal symptoms) (Patient not taking: Reported on 10/9/2019) 100 tablet 1     OLANZapine (ZYPREXA) 10 MG tablet Take 1 tablet (10 mg) by mouth daily as needed (Agitation) (Patient not taking: Reported on 10/9/2019) 30 tablet 0     ondansetron (ZOFRAN) 4 MG tablet Take 1 tablet (4 mg) by mouth every 8 hours as needed for nausea (Patient not taking: Reported on 10/9/2019) 30 tablet 1     traZODone (DESYREL) 50 MG tablet Take 1 tablet (50 mg) by mouth nightly as needed for sleep (Patient not taking: Reported on 10/9/2019) 30 tablet 0     Allergies   Allergen Reactions     Haldol [Haloperidol] Other (See Comments)     Makes patient very angry and anxious      Seroquel [Quetiapine] Palpitations     Spent 2 weeks in the hospital due to having seroquel, caused palpitations and QT prolongation     Adhesive Tape Hives     Percocet [Oxycodone-Acetaminophen] Nausea and Vomiting     Prednisone Other (See Comments) and Hives     Suicidal ideation     Risperidone Other (See Comments)     Tramadol Hcl Nausea and Vomiting     Droperidol Anxiety       Reviewed and updated as needed this visit by Provider  Problems              Fell while was roller blading down a hill. Did not have back pain at that time. Moved Sept 28th and 29th. Having lower back pain. Was lifting couches and boxes. Pain started after moving. No pain going down legs. Pain is to center of back. Pain is to both hips. Sharp pain. No new numbnss, has longstanding numbness to left leg after cauda equina. Is currently using ice, heat, tyelnol and advil. Is taking 2 extra strnghth tylenol three times per day, is taking 200 mg three times per day. Does not always take it with food. Has never been to PHYSICAL THERAPY for lower back pain.     Has not been eating due to mental health.  Continues to work with psychiatry regarding this.    Since Monday is now able to gets meds filled just at [facility] in [place]. Mental health is trying to get her off the restriction.  Reports, restriction with insurance was triggered by hospitalization when had cauda equina.  During that hospitalization was seen by multiple different providers and prescribed multiple different narcotics.  Upon discharge, reports followed with neurosurgery and was seen by a few different neurosurgeons because her surgeon was gone and because those narcotic prescriptions came from different providers triggered restriction by insurance.    Not taking anything for sleep. None of them seem to work. Is going to be seeing mental health again on the 23rd to have this readdressed.  Has not had lithium level drawn since June.  Thinks will have it done again at appointment  on the 23rd.    Taking lorazepam twice per day. Wife Sergio is managing and monitors if is taking meds.  Also, Sergio keeps lorazepam and dispenses to Ayana so she does not misuse it.    Is currently working at [employer]. Is having to do a lot of heavy lifting there.  Unsure how long will be able to continue working at [employer].  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    Has been craving nicotine so has started smoking. Plans to quit smoking and go back to chewing. When does not have a dip in will grind teeth    Received the following message from therapist.  Insurance said she is exceeding the mileage limit from her insurance. However, they faxed a form to your office that will allow her to continue to receive rides to [employer]. They said on the form, if you can reiterate that she is an established patient with you and this is the closest establishment that will meet her needs, that should be sufficient.  Ayana reports this form was completed yesterday by the care team.        Objective    /76 (BP Location: Right arm, Patient Position: Sitting, Cuff Size: Adult Regular)   Pulse 68   Temp 98.6  F (37  C) (Tympanic)   Resp 16   Wt 93.3 kg (205 lb 9.6 oz)   LMP  (LMP Unknown)   BMI 32.20 kg/m    Body mass index is 32.2 kg/m .          Assessment & Plan      Review of Systems   Constitutional: Positive for appetite change (decreased, due to mental health). Negative for fatigue.   HENT: Negative for ear pain, rhinorrhea and sore throat.    Eyes: Negative for visual disturbance.   Respiratory: Negative for cough, chest tightness, shortness of breath and wheezing.    Cardiovascular: Negative for chest pain, palpitations and leg swelling.   Gastrointestinal: Negative for abdominal distention, abdominal pain, constipation, diarrhea, nausea and vomiting.   Endocrine: Negative for cold intolerance and heat intolerance.   Musculoskeletal: Positive for back pain (lower that radiates to hips). Negative for  arthralgias and myalgias.   Skin: Negative for rash.   Neurological: Positive for numbness (at baseline). Negative for dizziness, light-headedness and headaches.   Psychiatric/Behavioral: Positive for sleep disturbance (not able to sleep). Negative for dysphoric mood. The patient is not nervous/anxious.         Continues to see mental health-craving nicotine        Physical Exam  Constitutional:       Appearance: Normal appearance. She is well-developed.   HENT:      Head: Normocephalic and atraumatic.      Right Ear: Tympanic membrane and external ear normal. No middle ear effusion.      Left Ear: Tympanic membrane and external ear normal.  No middle ear effusion.      Nose: No mucosal edema.   Neck:      Thyroid: No thyromegaly.      Vascular: No carotid bruit.   Cardiovascular:      Rate and Rhythm: Normal rate and regular rhythm.      Heart sounds: Normal heart sounds.   Pulmonary:      Effort: Pulmonary effort is normal.      Breath sounds: Normal breath sounds.   Abdominal:      General: Bowel sounds are normal.      Palpations: Abdomen is soft.   Musculoskeletal:      Lumbar back: She exhibits decreased range of motion, tenderness, bony tenderness, pain and spasm.        Back:    Skin:     General: Skin is warm and dry.   Neurological:      Mental Status: She is alert.   Psychiatric:         Behavior: Behavior normal.       1. Bulging lumbar disc  Will plan to set up for physical therapy.  Informed that pain will likely get worse before it gets better.  Will refer to pain management.  Recommend avoiding opioids due to previous history.  - [facility] REFERRAL; Future  - PAIN MANAGEMENT REFERRAL  - naproxen (NAPROSYN) 500 MG tablet; Take 1 pill twice per day with food for 2 weeks then every 12 hours as needed  Dispense: 60 tablet; Refill: 1    2. Need for influenza vaccination   today in clinic  - INFLUENZA VACCINE IM > 6 MONTHS VALENT IIV4 [66631]  -      ADMIN VACCINE, FIRST  [07879]    3. Tobacco abuse  Encouraged to quit smoking and chewing.  However, with mental illness this is difficult  - TOBACCO CESSATION ORDER FOR HM    4. Schizoaffective disorder, bipolar type (H)  Informed of drug interaction between naproxen and lithium.  Encouraged to have a lithium level checked at next appointment with psychiatry.  Does not necessarily mean needs medication adjusted increase in level would likely be due to recent use of naproxen which is going to be short-term.    Will fill out form in the future if needed for transportation.      Return in about 2 weeks (around 10/23/2019), or if symptoms worsen or fail to improve.    BROOKLYN Cruz [facility]     Statement Selected

## 2023-09-26 ENCOUNTER — OFFICE VISIT (OUTPATIENT)
Dept: PSYCHIATRY | Facility: CLINIC | Age: 33
End: 2023-09-26
Attending: NURSE PRACTITIONER
Payer: MEDICARE

## 2023-09-26 ENCOUNTER — LAB (OUTPATIENT)
Dept: LAB | Facility: CLINIC | Age: 33
End: 2023-09-26
Attending: NURSE PRACTITIONER
Payer: MEDICARE

## 2023-09-26 VITALS
BODY MASS INDEX: 38.29 KG/M2 | DIASTOLIC BLOOD PRESSURE: 84 MMHG | WEIGHT: 237.2 LBS | HEART RATE: 108 BPM | SYSTOLIC BLOOD PRESSURE: 133 MMHG

## 2023-09-26 DIAGNOSIS — R19.7 DIARRHEA, UNSPECIFIED TYPE: ICD-10-CM

## 2023-09-26 DIAGNOSIS — R79.89 ELEVATED LFTS: ICD-10-CM

## 2023-09-26 DIAGNOSIS — Z79.899 MEDICATION MANAGEMENT: ICD-10-CM

## 2023-09-26 DIAGNOSIS — E11.65 TYPE 2 DIABETES MELLITUS WITH HYPERGLYCEMIA, WITHOUT LONG-TERM CURRENT USE OF INSULIN (H): ICD-10-CM

## 2023-09-26 DIAGNOSIS — F25.0 SCHIZOAFFECTIVE DISORDER, BIPOLAR TYPE (H): Primary | ICD-10-CM

## 2023-09-26 DIAGNOSIS — K21.9 GASTROESOPHAGEAL REFLUX DISEASE WITHOUT ESOPHAGITIS: ICD-10-CM

## 2023-09-26 DIAGNOSIS — I10 HYPERTENSION, UNSPECIFIED TYPE: ICD-10-CM

## 2023-09-26 DIAGNOSIS — K76.0 FATTY LIVER DISEASE, NONALCOHOLIC: ICD-10-CM

## 2023-09-26 DIAGNOSIS — Z79.899 HIGH RISK MEDICATION USE: ICD-10-CM

## 2023-09-26 DIAGNOSIS — F41.9 ANXIETY: ICD-10-CM

## 2023-09-26 DIAGNOSIS — F15.20 AMPHETAMINE USE DISORDER, MODERATE (H): ICD-10-CM

## 2023-09-26 LAB
ALBUMIN SERPL BCG-MCNC: 4.6 G/DL (ref 3.5–5.2)
ALBUMIN UR-MCNC: NEGATIVE MG/DL
ALP SERPL-CCNC: 97 U/L (ref 35–129)
ALT SERPL W P-5'-P-CCNC: 86 U/L (ref 0–70)
ANION GAP SERPL CALCULATED.3IONS-SCNC: 17 MMOL/L (ref 7–15)
APPEARANCE UR: CLEAR
AST SERPL W P-5'-P-CCNC: ABNORMAL U/L
BILIRUB DIRECT SERPL-MCNC: <0.2 MG/DL (ref 0–0.3)
BILIRUB SERPL-MCNC: 0.2 MG/DL
BILIRUB UR QL STRIP: NEGATIVE
BUN SERPL-MCNC: 9.3 MG/DL (ref 6–20)
CALCIUM SERPL-MCNC: 10.1 MG/DL (ref 8.6–10)
CHLORIDE SERPL-SCNC: 107 MMOL/L (ref 98–107)
CHOLEST SERPL-MCNC: 269 MG/DL
COLOR UR AUTO: ABNORMAL
CREAT SERPL-MCNC: 0.53 MG/DL (ref 0.51–1.17)
CREAT UR-MCNC: 24.6 MG/DL
DEPRECATED HCO3 PLAS-SCNC: 16 MMOL/L (ref 22–29)
EGFRCR SERPLBLD CKD-EPI 2021: >90 ML/MIN/1.73M2
GLUCOSE SERPL-MCNC: 143 MG/DL (ref 70–99)
GLUCOSE UR STRIP-MCNC: >=1000 MG/DL
HBA1C MFR BLD: 7.8 %
HDLC SERPL-MCNC: 37 MG/DL
HGB UR QL STRIP: ABNORMAL
KETONES UR STRIP-MCNC: NEGATIVE MG/DL
LDLC SERPL CALC-MCNC: ABNORMAL MG/DL
LDLC SERPL DIRECT ASSAY-MCNC: 137 MG/DL
LEUKOCYTE ESTERASE UR QL STRIP: NEGATIVE
LITHIUM SERPL-SCNC: 0.21 MMOL/L (ref 0.6–1.2)
MICROALBUMIN UR-MCNC: <12 MG/L
MICROALBUMIN/CREAT UR: NORMAL MG/G{CREAT}
MUCOUS THREADS #/AREA URNS LPF: PRESENT /LPF
NITRATE UR QL: NEGATIVE
NONHDLC SERPL-MCNC: 232 MG/DL
PH UR STRIP: 7 [PH] (ref 5–7)
POTASSIUM SERPL-SCNC: 4.4 MMOL/L (ref 3.4–5.3)
PROT SERPL-MCNC: 7.9 G/DL (ref 6.4–8.3)
RBC URINE: 16 /HPF
SODIUM SERPL-SCNC: 140 MMOL/L (ref 135–145)
SP GR UR STRIP: 1.01 (ref 1–1.03)
SQUAMOUS EPITHELIAL: 2 /HPF
TRIGL SERPL-MCNC: 759 MG/DL
UROBILINOGEN UR STRIP-MCNC: NORMAL MG/DL
WBC URINE: 10 /HPF

## 2023-09-26 PROCEDURE — 80061 LIPID PANEL: CPT

## 2023-09-26 PROCEDURE — 82570 ASSAY OF URINE CREATININE: CPT

## 2023-09-26 PROCEDURE — 36415 COLL VENOUS BLD VENIPUNCTURE: CPT

## 2023-09-26 PROCEDURE — 83721 ASSAY OF BLOOD LIPOPROTEIN: CPT

## 2023-09-26 PROCEDURE — 82310 ASSAY OF CALCIUM: CPT

## 2023-09-26 PROCEDURE — G2212 PROLONG OUTPT/OFFICE VIS: HCPCS | Performed by: NURSE PRACTITIONER

## 2023-09-26 PROCEDURE — G0463 HOSPITAL OUTPT CLINIC VISIT: HCPCS | Performed by: NURSE PRACTITIONER

## 2023-09-26 PROCEDURE — 80178 ASSAY OF LITHIUM: CPT

## 2023-09-26 PROCEDURE — 83036 HEMOGLOBIN GLYCOSYLATED A1C: CPT

## 2023-09-26 PROCEDURE — 82248 BILIRUBIN DIRECT: CPT

## 2023-09-26 PROCEDURE — 82306 VITAMIN D 25 HYDROXY: CPT

## 2023-09-26 PROCEDURE — 81001 URINALYSIS AUTO W/SCOPE: CPT

## 2023-09-26 PROCEDURE — 99215 OFFICE O/P EST HI 40 MIN: CPT | Performed by: NURSE PRACTITIONER

## 2023-09-26 PROCEDURE — 84155 ASSAY OF PROTEIN SERUM: CPT

## 2023-09-26 RX ORDER — CLONAZEPAM 0.5 MG/1
0.5 TABLET ORAL DAILY PRN
Qty: 30 TABLET | Refills: 0 | Status: SHIPPED | OUTPATIENT
Start: 2023-09-26 | End: 2023-10-11

## 2023-09-26 RX ORDER — BUSPIRONE HYDROCHLORIDE 15 MG/1
15 TABLET ORAL 3 TIMES DAILY
Qty: 90 TABLET | Refills: 1 | Status: SHIPPED | OUTPATIENT
Start: 2023-09-26 | End: 2023-10-11

## 2023-09-26 RX ORDER — TRIFLUOPERAZINE HYDROCHLORIDE 2 MG/1
TABLET, FILM COATED ORAL
Qty: 60 TABLET | Refills: 1 | Status: SHIPPED | OUTPATIENT
Start: 2023-09-26 | End: 2023-10-11

## 2023-09-26 ASSESSMENT — PAIN SCALES - GENERAL: PAINLEVEL: NO PAIN (0)

## 2023-09-26 NOTE — PATIENT INSTRUCTIONS
-Decrease Stelazine to 2 mg daily for 1 week and discontinue. Monitor for psychosis, paranoia and sleep.  -Klonopin is now changed to as needed only. Do not use Klonopin while using alcohol.  -Continue all other medication regimen for now.      Your next appointment is scheduled on 10/31/2023 (Tue) at 12:30pm in person. If you establish ACT provider before this appointment, follow up with ACT team provider.    Thank you for coming to the [facility] M[facility] HEALTH & ADDI[facility].    Lab Testing:  If you had lab testing today and your results are reassuring or normal they will be mailed to you or sent through [employer] within 7 days. If the lab tests need quick action we will call you with the results. The phone number we will call with results is # [phone] (home) . If this is not the best number please call our clinic and change the number.    Medication Refills:  If you need any refills please call your pharmacy and they will contact us. Our fax number for refills is [phone]. Please allow three business for refill processing. If you need to  your refill at a new pharmacy, please contact the new pharmacy directly. The new pharmacy will help you get your medications transferred.     Scheduling:  If you have any concerns about today's visit or wish to schedule another appointment please call our office during normal business hours [phone] (8-5:00 M-F)    Contact Us:  Please call [phone] during business hours (8-5:00 M-F).  If after clinic hours, or on the weekend, please call  [phone].    Financial Assistance [phone]  [facility] Billing [phone]  Central Billing Office, [facility]: [phone]  [facility] Billing [phone]  Medical Records [phone]      MENTAL HEALTH CRISIS NUMBERS:  For a medical emergency please call  911 or go to the nearest ER.     [facility]:   [facility] -[phone]   Crisis Residence [facility]  -[phone]   Walk[facility] Center [facility] -[phone]   COPE 24/7 [facility] Mobile Team -[phone] (adults)/[phone] (child)  CHILD: [facility] needs assessment team - [phone]      [facility]:   [facility] - [phone]   Walk-in counseling [facility] - [phone]   Walk-in counseling [facility] - [phone]   Crisis Residence [facility] - [phone]  Urgent Care Adult Mental Pvkxct-[phone] mobile unit/ 24/7 crisis line    National Crisis Numbers:   National Suicide Prevention Lifeline: 1-536-822-TALK (531-302-8921)  Poison Control Center - 7-402-233-5737  [employer]/resources for a list of additional resources (SOS)  Trans Lifeline a hotline for transgender people 1-132.279.3584  The Manuel Project a hotline for LGBT youth 6-774-958-3913  Crisis Text Line: For any crisis 24/7   To: 611304  see www.crisistextline.org  - IF MAKING A CALL FEELS TOO HARD, send a text!         Again thank you for choosing [facility] MENTAL HEALTH & ADDICTION [facility] and please let us know how we can best partner with you to improve you and your family's health.    You may be receiving a survey regarding this appointment. We would love to have your feedback, both positive and negative. The survey is done by an external company, so your answers are anonymous.

## 2023-09-26 NOTE — NURSING NOTE
Chief Complaint   Patient presents with    Recheck Medication     Schizoaffective disorder, bipolar type     - Matty Sands, Visit Facilitator
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
## 2023-09-27 ENCOUNTER — TELEPHONE (OUTPATIENT)
Dept: PSYCHIATRY | Facility: CLINIC | Age: 33
End: 2023-09-27
Payer: MEDICARE

## 2023-09-27 DIAGNOSIS — F90.2 ATTENTION DEFICIT HYPERACTIVITY DISORDER (ADHD), COMBINED TYPE: ICD-10-CM

## 2023-09-27 RX ORDER — DEXTROAMPHETAMINE SACCHARATE, AMPHETAMINE ASPARTATE MONOHYDRATE, DEXTROAMPHETAMINE SULFATE AND AMPHETAMINE SULFATE 6.25; 6.25; 6.25; 6.25 MG/1; MG/1; MG/1; MG/1
25 CAPSULE, EXTENDED RELEASE ORAL DAILY
Qty: 30 CAPSULE | Refills: 0 | Status: SHIPPED | OUTPATIENT
Start: 2023-09-28 | End: 2023-10-11

## 2023-09-27 RX ORDER — PEN NEEDLE, DIABETIC 32GX 5/32"
NEEDLE, DISPOSABLE MISCELLANEOUS
Qty: 100 EACH | Refills: 1 | Status: SHIPPED | OUTPATIENT
Start: 2023-09-27 | End: 2024-05-14

## 2023-09-27 NOTE — TELEPHONE ENCOUNTER
Patient states that uncle  and he needs to go out of town and is asking to refill Adderall 1 day early. Message sent to provider.

## 2023-09-27 NOTE — TELEPHONE ENCOUNTER
Patient called and asked to have it sent to [id number] - [place], [place] - [address]. Medication resent to [facility].

## 2023-09-28 ENCOUNTER — VIRTUAL VISIT (OUTPATIENT)
Dept: FAMILY MEDICINE | Facility: CLINIC | Age: 33
End: 2023-09-28
Payer: MEDICARE

## 2023-09-28 DIAGNOSIS — E78.5 HYPERLIPIDEMIA LDL GOAL <100: Primary | ICD-10-CM

## 2023-09-28 DIAGNOSIS — E11.65 TYPE 2 DIABETES MELLITUS WITH HYPERGLYCEMIA, WITHOUT LONG-TERM CURRENT USE OF INSULIN (H): ICD-10-CM

## 2023-09-28 LAB — VIT D+METAB SERPL-MCNC: 29 NG/ML (ref 20–50)

## 2023-09-28 PROCEDURE — 99442 PR PHYSICIAN TELEPHONE EVALUATION 11-20 MIN: CPT | Mod: VID | Performed by: NURSE PRACTITIONER

## 2023-09-28 RX ORDER — ROSUVASTATIN CALCIUM 40 MG/1
40 TABLET, COATED ORAL DAILY
Qty: 90 TABLET | Refills: 3 | Status: SHIPPED | OUTPATIENT
Start: 2023-09-28 | End: 2024-08-12

## 2023-09-28 ASSESSMENT — ENCOUNTER SYMPTOMS
NAUSEA: 0
DIARRHEA: 0
APPETITE CHANGE: 1
VOMITING: 0

## 2023-09-28 NOTE — PROGRESS NOTES
Prakash is a 33 year old who is being evaluated via a billable video visit.      How would you like to obtain your AVS? MyChart  If the video visit is dropped, the invitation should be resent by: Text to cell phone: [phone]  Will anyone else be joining your video visit? No          Assessment & Plan     Type 2 diabetes mellitus with hyperglycemia, without long-term current use of insulin (H)  Discontinue NovoLog insulin.  Increase Lantus insulin from 20 units to 25 units daily.  Continue Jardiance.  We will plan office appointment in 3 months for diabetic check.  Recommend eye exam.  - insulin glargine (LANTUS PEN) 100 UNIT/ML pen; Inject 25 Units Subcutaneous every morning (before breakfast)    Hyperlipidemia LDL goal <100  Long discussion held regarding use of statin medications in those of childbearing years.  Reports does not plan to be sexually active with males or attempt pregnancy in the future.  Informed that statins can be detrimental to fetus.  We will plan to start on Crestor daily.  Educated on use and possible side effects.  Recommend 2000 mg over-the-counter fish oil-declines wanting to take.  We will plan office visit in 3 months with fasting labs.  - rosuvastatin (CRESTOR) 40 MG tablet; Take 1 tablet (40 mg) by mouth daily     Review of the result(s) of each unique test - Labs  Ordering of each unique test  Prescription drug management     Nicotine/Tobacco Cessation:  He reports that he has been smoking other and cigarettes. He started smoking about 12 years ago. He has a 2.50 pack-year smoking history. He has never been exposed to tobacco smoke. He quit smokeless tobacco use about 3 years ago.  His smokeless tobacco use included chew.  Nicotine/Tobacco Cessation Plan:   Information offered: Patient not interested at this time      BROOKLYN Cruz CNP  [facility] SHAILESH Randall is a 33 year old, presenting for the following health issues:  No chief complaint on  file.      HPI     Patient with history of diabetes arrived to follow-up lab results from 09/26/23.     Diabetes Follow-up    How often are you checking your blood sugar? Not at all  What concerns do you have today about your diabetes? Other: Abnormal lab results    Do you have any of these symptoms? (Select all that apply)  No numbness or tingling in feet.  No redness, sores or blisters on feet.  No complaints of excessive thirst.  No reports of blurry vision.  No significant changes to weight.      BP Readings from Last 2 Encounters:   08/28/23 (!) 138/90   07/21/23 126/87     Hemoglobin A1C (%)   Date Value   09/26/2023 7.8 (H)   08/10/2023 8.8 (H)   03/19/2021 5.8 (H)   11/05/2018 5.8 (H)     LDL Cholesterol Calculated   Date Value   09/26/2023      Comment:     Cannot estimate LDL when triglyceride exceeds 400 mg/dL   05/28/2023 36 mg/dL   04/28/2021 81 mg/dL   03/01/2021 51 mg/dL     LDL Cholesterol Direct (mg/dL)   Date Value   09/26/2023 137 (H)             Video visit completed to discuss most recent labs.  Converted to phone call due to audio issues.    Ozempic was stopped when was in the hospital. Unsure why it was stopped.  Does not feel like Ozempic really helped to control cravings or appetite.  Started on Jardiance, feels like this helps better to decrease appetite.  Is not eating regularlly scheduled meals.  Does not take fast acting insulin very often.  When does not eat will still take Jardiance and Lantus.  Is not checking blood sugars.  Has continuous glucose monitor-does not like to wear it.  Does not feel like blood sugars are high.  When blood sugars are high will be very thirsty.  Has not noticed any increased thirst.  Recently had lipids drawn with significantly elevated triglycerides.  Is not having any abdominal pain, discomfort nausea or vomiting.  Is not currently sexually active with males.  Is receiving testosterone.  Is getting regular menstrual cycles.  Denies concern for pregnancy.   Does not plan to ever be sexually active with males in the future.  States is tired of taking medications and pills all the time is no longer going to be following psychiatry and is going to be enrolled in an intensive psychiatric program, [facility], will be receiving mental health care from multiple modalities.        Review of Systems   Constitutional:  Positive for appetite change (decreased).   Gastrointestinal:  Negative for diarrhea, nausea and vomiting.            Objective           Vitals:  No vitals were obtained today due to virtual visit.    Physical Exam   No physical exam completed-phone call visit.          Phone call visit-14 minutes    Originating Location (pt. Location): Home    Distant Location (provider location):  Off-site  Platform used for Video Visit: Elías

## 2023-10-02 ENCOUNTER — E-VISIT (OUTPATIENT)
Dept: FAMILY MEDICINE | Facility: CLINIC | Age: 33
End: 2023-10-02
Payer: MEDICARE

## 2023-10-02 ENCOUNTER — TELEPHONE (OUTPATIENT)
Dept: DERMATOLOGY | Facility: CLINIC | Age: 33
End: 2023-10-02
Payer: MEDICARE

## 2023-10-02 ENCOUNTER — NURSE TRIAGE (OUTPATIENT)
Dept: NURSING | Facility: CLINIC | Age: 33
End: 2023-10-02

## 2023-10-02 DIAGNOSIS — R51.9 INTRACTABLE HEADACHE, UNSPECIFIED CHRONICITY PATTERN, UNSPECIFIED HEADACHE TYPE: Primary | ICD-10-CM

## 2023-10-02 PROCEDURE — 99207 PR NON-BILLABLE SERV PER CHARTING: CPT | Performed by: NURSE PRACTITIONER

## 2023-10-02 NOTE — TELEPHONE ENCOUNTER
Left Voicemail (1st Attempt) for the patient to call back and schedule the following:    Appointment type: Return  Provider: Dr. Cleary  Return date: 12/18/23  Specialty phone number: [phone]

## 2023-10-03 ENCOUNTER — TELEPHONE (OUTPATIENT)
Dept: FAMILY MEDICINE | Facility: CLINIC | Age: 33
End: 2023-10-03
Payer: MEDICARE

## 2023-10-03 NOTE — DISCHARGE INSTRUCTIONS
Discharge Instructions  Gastroenteritis    You have been seen today for vomiting and diarrhea, called gastroenteritis or the stomach flu. This is usually caused by a virus, but some bacteria, parasites, medicines or other medical conditions can cause similar symptoms. At this time your doctor does not find that your vomiting and diarrhea is a sign of anything dangerous or life-threatening.  However, sometimes the signs of serious illness do not show up right away.  If you have new or worse symptoms, you may need to be seen again in the Emergency Department or by your primary doctor. Remember that serious problems like appendicitis can look like gastroenteritis at first.       Return to the Emergency Department if:  You keep throwing up and you are not able to keep liquids down.  You feel you are getting dehydrated, such as being very thirsty, not urinating at least every 8-12 hours, or feeling faint or lightheaded.   You develop a new fever, or your fever continues for more than 2 days.  You have belly pain that seems worse than cramps, is in one spot, or is getting worse over time.   You have blood in your vomit or in your diarrhea.  You feel very weak.  You are not starting to improve within 24 hours of your visit here.    What can I do to help myself?  The most important thing to do is to drink clear liquids.  If you have been vomiting a lot, it is best to have only small, frequent sips of liquids.  Drinking too much at once may cause more vomiting. If you are vomiting often, you must replace minerals, sodium and potassium lost with your illness. Pedialyte  and sports drinks can help you replace these minerals.  You can also drink clear liquids such as water, weak tea, apple juice, and 7-Up . Avoid acid liquids (orange), caffeine (coffee) or alcohol. Do not drink milk until you no longer have diarrhea.  After liquids are staying down, you may start eating mild foods. Soda crackers, toast, plain noodles, gelatin,  Your J junostomy tube was successfully replaced in the emergency department. Follow-up with your surgeon as needed if you have any complications. applesauce and bananas are good first choices.  Avoid foods that have acid, are spicy, fatty or fibrous (such as meats, coarse grains, vegetables). You may start eating these foods again in about 3 days when you are better.  Sometimes treatment includes prescription medicine to prevent nausea and vomiting and to prevent diarrhea. If your doctor prescribes these for you, take them as directed.  Nonprescription medicine is available for the treatment of diarrhea and can be very effective.  If you use it, make sure you use the dose recommended on the package. Avoid Lomotil . Check with your healthcare provider before you use any medicine for diarrhea.  Don?t take ibuprofen, or other nonsteroidal anti-inflammatory medicines without checking with your healthcare provider.  If you were given a prescription for medicine here today, be sure to read all of the information (including the package insert) that comes with your prescription.  This will include important information about the medicine, its side effects, and any warnings that you need to know about.  The pharmacist who fills the prescription can provide more information and answer questions you may have about the medicine.  If you have questions or concerns that the pharmacist cannot address, please call or return to the Emergency Department.   Opioid Medication Information    Pain medications are among the most commonly prescribed medicines, so we are including this information for all our patients. If you did not receive pain medication or get a prescription for pain medicine, you can ignore it.     You may have been given a prescription for an opioid (narcotic) pain medicine and/or have received a pain medicine while here in the Emergency Department. These medicines can make you drowsy or impaired. You must not drive, operate dangerous equipment, or engage in any other dangerous activities while taking these medications. If you drive while taking these  medications, you could be arrested for DUI, or driving under the influence. Do not drink any alcohol while you are taking these medications.     Opioid pain medications can cause addiction. If you have a history of chemical dependency of any type, you are at a higher risk of becoming addicted to pain medications.  Only take these prescribed medications to treat your pain when all other options have been tried. Take it for as short a time and as few doses as possible. Store your pain pills in a secure place, as they are frequently stolen and provide a dangerous opportunity for children or visitors in your house to start abusing these powerful medications. We will not replace any lost or stolen medicine.  As soon as your pain is better, you should flush all your remaining medication.     Many prescription pain medications contain Tylenol  (acetaminophen), including Vicodin , Tylenol #3 , Norco , Lortab , and Percocet .  You should not take any extra pills of Tylenol  if you are using these prescription medications or you can get very sick.  Do not ever take more than 3000 mg of acetaminophen in any 24 hour period.    All opioids tend to cause constipation. Drink plenty of water and eat foods that have a lot of fiber, such as fruits, vegetables, prune juice, apple juice and high fiber cereal.  Take a laxative if you don?t move your bowels at least every other day. Miralax , Milk of Magnesia, Colace , or Senna  can be used to keep you regular.      Remember that you can always come back to the Emergency Department if you are not able to see your regular doctor in the amount of time listed above, if you get any new symptoms, or if there is anything that worries you.

## 2023-10-03 NOTE — TELEPHONE ENCOUNTER
Patient calling back, see other encounter, he declines ER and plans to see a [facility] provider tomorrow afternoon.    The other encounter has been routed to PCP.    Edith HERNANDEZ RN  [facility] Triage

## 2023-10-03 NOTE — CONFIDENTIAL NOTE
Becki Avery APRN CNP routed conversation to Be-Rn Pool1 hour ago (6:49 AM)     Becki Avery APRN CNP1 hour ago (6:49 AM)     AK  Please see submitted e--visit.  E-visit for headache, passed out multiple times, hitting head.  Needs to be evaluated urgently in person.  Recommend urgent care.  Please ensure that patient receives this message.     Becki CARVALHOC

## 2023-10-03 NOTE — TELEPHONE ENCOUNTER
Please see submitted e--visit.  E-visit for headache, passed out multiple times, hitting head.  Needs to be evaluated urgently in person.  Recommend urgent care.  Please ensure that patient receives this message.    Becki JENKINS

## 2023-10-03 NOTE — TELEPHONE ENCOUNTER
Triage Call:    Caller: Patient  Was trying to schedule an appointment for tomorrow.  Is having concussion syptoms right now.  The injury happened about 2-3 hours ago.  Passed out and hit his head, he doesn't remember much, but just woke up.  He isn't sure why he passed out.  He went to the ER, but the wait was a few hours and he had to leave as he couldn't sit up that long, he is having a lot of pain in his head.      Protocol Recommended Disposition: ED.  He isn't sure if he is going to go, he wanted to make an appointment for tomorrow.  Advised he would need to call back in the morning for that and strongly encouraged him to go to the ED  given the current symptoms.  Advised he could call 911 also if he has no transportation, due to the severity of his symptoms.  He verbalized understanding.      Routing to PCP team high priority to follow-up with him in the morning if no ED visit seen.    Nancy Monzon RN on 10/2/2023 at 11:19 PM    Reason for Disposition   Can't remember what happened (amnesia)    Additional Information   Negative: [1] ACUTE NEURO SYMPTOM AND [2] present now  (DEFINITION: difficult to awaken OR confused thinking and talking OR slurred speech OR weakness of arms OR unsteady walking)   Negative: Knocked out (unconscious) > 1 minute   Negative: Seizure (convulsion) occurred  (Exception: Prior history of seizures and now alert and without Acute Neuro Symptoms.)   Negative: Penetrating head injury (e.g., knife, gun shot wound, metal object)   Negative: [1] Major bleeding (e.g., actively dripping or spurting) AND [2] can't be stopped   Negative: [1] Dangerous mechanism of injury (e.g., MVA, diving, trampoline, contact sports, fall > 10 feet or 3 meters) AND [2] NECK pain AND [3] began < 1 hour after injury   Negative: Sounds like a life-threatening emergency to the triager    Protocols used: Head Injury-A-

## 2023-10-03 NOTE — CONFIDENTIAL NOTE
"Does not appear patient was seen in ED. Appears an EVISIT was completed and Becki Avery responded today with directions below:   \"Becki Avery APRN CNP   to Prakash Prasad   AK      10/3/23  6:47 AM  Prakash,     Thank you for submitting the e--visit.  I have reviewed it.     Because you passed out multiple times hitting your head it is imperative that you be evaluated more urgently.  Need to try to determine the cause of you passing out and you need to be evaluated in person to be evaluated for more severe head trauma.  Please be seen today in urgent care.          Neeru Muro RN on 10/3/2023 at 7:31 AM    "

## 2023-10-03 NOTE — CONFIDENTIAL NOTE
RN left message to return call to clinic [phone] and also referred to myChart sent with provider response.     Neeru Muro RN on 10/3/2023 at 8:36 AM

## 2023-10-03 NOTE — CONFIDENTIAL NOTE
Called patient at [phone] (home)   Did they answer the phone: No, left a message on voicemail to return call to the [facility] at [phone], and to ask for any available triage nurse.    Tran ESPINO BSN  Triage Nurse  [facility]

## 2023-10-03 NOTE — TELEPHONE ENCOUNTER
Patient called back just now.   Says he will go to ER if symptoms worsen but does not want to go now.   Has a visit with a [place] provider tomorrow afternoon.    I advised again that PCP advised ER/UC visit tonight.    Prakash verbalized understanding of that but prefers to monitor and have clinic visit tomorrow.    Routed back to PCP as FYI.    Edith HERNANDEZ RN  [facility] Triage

## 2023-10-03 NOTE — TELEPHONE ENCOUNTER
Placing this note onto triage encounter 10/2/23.     RN closing this encounter      Neeru Muro RN on 10/3/2023 at 7:56 AM

## 2023-10-05 ENCOUNTER — TELEPHONE (OUTPATIENT)
Dept: DERMATOLOGY | Facility: CLINIC | Age: 33
End: 2023-10-05
Payer: MEDICARE

## 2023-10-05 NOTE — TELEPHONE ENCOUNTER
Left Voicemail (2nd Attempt) for the patient to call back and schedule the following:    Appointment type: Return  Provider: Dr. Cleary  Return date: 12/18/23  Specialty phone number: [phone]

## 2023-10-06 NOTE — PROGRESS NOTES
Prior Authorization **APPROVED**    Authorization Effective Date: 1/1/2023  Authorization Expiration Date: 8/27/2027  Medication: TRETINOIN 0.05 % EX CREA  Approved Dose/Quantity: -  Reference #: CoverMyMeds Key: [id number] - [place] Case ID: [id number]   Insurance Company: [employer] Part D - Phone [phone] Fax [phone]  Expected CoPay: $$0.00     CoPay Card Available: No    Foundation Assistance Needed: n/a  Which Pharmacy is filling the prescription (Not needed for infusion/clinic administered): [facility] [place], [place] - [address]  Pharmacy Notified: Yes  Patient Notified: Yes  Comments:  -            Bisi Ford CPhT  [place] [facility] Liaison  Pronouns: She/Her/Hers    [facility]  [address] [address]  [address], [place] [address]   [email or web address]  [email or web address]   Phone: [phone]  Pager: [phone]  Fax: [phone]

## 2023-10-09 ENCOUNTER — TELEPHONE (OUTPATIENT)
Dept: PSYCHIATRY | Facility: CLINIC | Age: 33
End: 2023-10-09
Payer: MEDICARE

## 2023-10-09 NOTE — TELEPHONE ENCOUNTER
[facility] Call Center    Phone Message    May a detailed message be left on voicemail: yes     Reason for Call: Other: Patient's  called stating that the patient's mental health symptoms have recently significantly worsened. The caller was wondering if it would be possible to either have the patient scheduled for a sooner appointment with their provider sometime this week, or otherwise have their provider reach out to the patient to do a check-in with them.     Action Taken: Message routed to:  Other: [facility] psychiatry nurse [place]    Travel Screening: Not Applicable

## 2023-10-09 NOTE — TELEPHONE ENCOUNTER
"Writer returned call to Cristy. Cristy states that patient has had an ongoing increase in mental health symptoms. She states that he's been using a lot of substances, particularly meth. She states after discharging from the hospital, he did not follow up with the treatment programming and discontinued some of his services. She states that he called her today stating that \"he can't take this anymore\" and is ready to make some type of change. Per Cristy, patient is frustrated, stating that he has not noticed any longstanding change on his medications and would like to make some sort of drastic change as he does not feel that what he's doing is helping enough. Cristy states that he reached out for a therapy referral, which is positive.    Per Cristy, patient has chronic SI, but is denying any safety concerns and currently lives in a group home. Cristy states that patient is very motivated to not be sent to the hospital.    Writer attempted to call patient to check in and schedule sooner appointment with provider (10/11). LVM requesting a call back at the main clinic number.   "

## 2023-10-09 NOTE — TELEPHONE ENCOUNTER
"Patient returned writer's called. Patient reports that he has been constantly anxious and not getting out of bed or doing anything. When asked about safety concerns, patient responded \"not at all.\" Patient denies any substance use at time of call and states he is just using medical marijuana when he needs it.     Patient states he has not been taking his medications as prescribed and reports he has only been taking his Seroquel. Patient was unable to tell writer how long it had been since he had taken his other medications. Patient states that staff administer his medications, but states \"I haven't talked to any staff in a few days\" and reports he is not receiving his other medictions.    Writer attempted to call Domenica to inquire about whether patient has been receiving his medications and if not, for how long. LVM requesting a call back at the main clinic number.         "

## 2023-10-11 ENCOUNTER — TELEPHONE (OUTPATIENT)
Dept: DERMATOLOGY | Facility: CLINIC | Age: 33
End: 2023-10-11
Payer: MEDICARE

## 2023-10-11 ENCOUNTER — TELEPHONE (OUTPATIENT)
Dept: PSYCHIATRY | Facility: CLINIC | Age: 33
End: 2023-10-11
Payer: MEDICARE

## 2023-10-11 ENCOUNTER — VIRTUAL VISIT (OUTPATIENT)
Dept: PSYCHIATRY | Facility: CLINIC | Age: 33
End: 2023-10-11
Attending: NURSE PRACTITIONER
Payer: MEDICARE

## 2023-10-11 DIAGNOSIS — Z79.899 MEDICATION MANAGEMENT: ICD-10-CM

## 2023-10-11 DIAGNOSIS — F41.9 ANXIETY: Primary | ICD-10-CM

## 2023-10-11 DIAGNOSIS — F25.0 SCHIZOAFFECTIVE DISORDER, BIPOLAR TYPE (H): ICD-10-CM

## 2023-10-11 PROCEDURE — 99215 OFFICE O/P EST HI 40 MIN: CPT | Mod: 95 | Performed by: NURSE PRACTITIONER

## 2023-10-11 RX ORDER — QUETIAPINE FUMARATE 100 MG/1
550 TABLET, FILM COATED ORAL DAILY
Qty: 165 TABLET | Refills: 1 | Status: SHIPPED | OUTPATIENT
Start: 2023-10-11 | End: 2023-10-26

## 2023-10-11 ASSESSMENT — PATIENT HEALTH QUESTIONNAIRE - PHQ9: SUM OF ALL RESPONSES TO PHQ QUESTIONS 1-9: 15

## 2023-10-11 ASSESSMENT — PAIN SCALES - GENERAL: PAINLEVEL: NO PAIN (0)

## 2023-10-11 NOTE — PATIENT INSTRUCTIONS
-Increase Seroquel to 550 mg daily. Monitor for oversedation.  -Adderall, Klonopin, Buspar, Lithium, Stelazine are discontinued as you are not taking the medications.    -Complete EKG in 2 weeks after starting increased Seroquel dose.    Your next appointment is scheduled on 10/31/2023 (Tue) at 12:30pm.      **For crisis resources, please see the information at the end of this document**   Patient Education    Thank you for coming to the [facility] [place] [facility].     Lab Testing:  If you had lab testing today and your results are reassuring or normal they will be mailed to you or sent through [employer] within 7 days. If the lab tests need quick action we will call you with the results. The phone number we will call with results is # [phone]. If this is not the best number please call our clinic and change the number.     Medication Refills:  If you need any refills please call your pharmacy and they will contact us. Our fax number for refills is [phone].   Three business days of notice are needed for general medication refill requests.   Five business days of notice are needed for controlled substance refill requests.   If you need to change to a different pharmacy, please contact the new pharmacy directly. The new pharmacy will help you get your medications transferred.     Contact Us:  Please call [phone] during business hours (8-5:00 M-F).   If you have medication related questions after clinic hours, or on the weekend, please call [phone].     Financial Assistance [phone]   Medical Records [phone]       MENTAL HEALTH CRISIS RESOURCES:  For a emergency help, please call 911 or go to the nearest Emergency Department.     Emergency Walk-In Options:   EmPATH Unit @ [facility] ([place]): [phone] - Specialized mental health emergency area designed to be calming  [facility] ([place]): [phone]  [facility] ([place]): [phone]  [facility] ([place]): [phone]    [facility] Crisis Information:   [place]: [phone]  [place]: [phone]  [place] ([place]) - Adult: [phone]     Child: [phone]  [place] - Adult: [phone]     Child: [phone]  [place]: [phone]  List of all [facility] resources:   https://[place].[email or web address]/dhs/people-we-serve/adults/health-care/mental-health/resources/crisis-contacts.jsp    National Crisis Information:   Crisis Text Line: Text  [place]  to 750114  Suicide & Crisis Lifeline: 988  National Suicide Prevention Lifeline: 4-656-382-TALK (1-850.942.2577)       For online chat options, visit https://suicidepreventionlifeline.org/chat/  Poison Control Center: 1-991.960.9871  Trans Lifeline: 1-676.900.5837 - Hotline for transgender people of all ages  The [employer]: 4-809-407-0790 - Hotline for LGBT youth     For Non-Emergency Support:   Fast Tracker: Mental Health & Substance Use Disorder Resources -   https://www.[employer]n.org/

## 2023-10-11 NOTE — TELEPHONE ENCOUNTER
Writer attempted to call patient's , Cristy, to inquire whether she would be able to attend patient's appointment at 12:00 today. LVM requesting a call back at the main clinic number.

## 2023-10-11 NOTE — PROGRESS NOTES
"Ayana Prasad is a 33 year old who is being evaluated via a billable video visit.      Pt will join video visit via: {Virtual Visit Platforms:367299::\"[employer]\"}  If there are problems joining the visit, send backup video invite via: {Video Invite Backup:842348}    Originating location (patient location): {OP [facility]H Video Visit Originating Sites:067445}    Will anyone else be joining the visit? {:[id number]}    "

## 2023-10-11 NOTE — TELEPHONE ENCOUNTER
Left Voicemail (2nd Attempt) sent Letter for the patient to call back and schedule the following:    Appointment type: RTN  Provider: Dr. Cleary  Return date: 12/18/2023  Specialty phone number: [phone]  Additional appointment(s) needed:   Additonal Notes: This appointment has now been cancelled. Dr. Cleary will not be available.
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
## 2023-10-11 NOTE — TELEPHONE ENCOUNTER
Writer called [facility] - [place], [place] - [address] and had pharmacist cancel all Adderall, Buspar, Klonopin, Lithium and Stelazine prescriptions per provider request.     Writer attempted to call Domenica at [phone], and ROSE requesting a call back at the main clinic number.

## 2023-10-11 NOTE — NURSING NOTE
Is the patient currently in the state of [place]? YES    Visit mode:VIDEO    If the visit is dropped, the patient can be reconnected by: VIDEO VISIT: Text to cell phone:   Telephone Information:   Mobile [phone]       Will anyone else be joining the visit? NO  (If patient encounters technical issues they should call [phone]    How would you like to obtain your AVS? MyChart    Are changes needed to the allergy or medication list? No    Reason for visit: RECHECK    Kezia VANN

## 2023-10-17 ENCOUNTER — TELEPHONE (OUTPATIENT)
Dept: PSYCHIATRY | Facility: CLINIC | Age: 33
End: 2023-10-17
Payer: MEDICARE

## 2023-10-17 NOTE — TELEPHONE ENCOUNTER
Called Cristy BARTHOLOMEW as the call to her has not been returned. CM only able to speak briefly as she is expecting a call. Communicated this writer's concern that pt is not doing well. Still pt is not disclosing substance use. Also call to  not returned to ensure pt is not given any medications besides Seroquel as all other medications were discontinued since pt has not taken the medication.    Cristy notes that she has not gotten response from ACT team. Does not think  residency is necessarily a barrier to receiving ACT service. Although she agrees that pt needs substance use treatment, MI commitment can't force him to go to CD treatment and forced CD treatment will not be beneficial.    Cristy will reach out to her supervisor to give us direct contact to . Supervisor: Nadine ([phone]) if this provider has any other concern that would like to be addressed today. Noted that there's no other concern today.    Regine Last, CNP, 10/17/2023

## 2023-10-17 NOTE — TELEPHONE ENCOUNTER
Received 3 pages from [facility] requesting updated medication lists. Chart reviewed and writer called to discuss Medication plan SRINIVAS Shaffer .Per encounter 10/11/2023 all medications were discontinued and patient is now only taking Quetiapine 550 mg. Medications list confirmed and will fax updated list to [facility] , Cristy Ji CM .      On cover sheet will note to Cristy to contact office to discuss further with medication changes. See encounter on 10/11/2023.    Medication list with [facility] forms to [phone]. Sent to scan and held in nurse triage until scan in chart .    ELI Talbert  Cell [phone]    Alexia Radha on 10/17/2023 at 1:15 PM
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
## 2023-10-19 NOTE — TELEPHONE ENCOUNTER
Writer attempted to call patient's , Cristy to ensure they were aware that patient had broken into med cupboard. Writer was unable to leave a message due to mailbox being full. Will attempt to reach out again later.

## 2023-10-20 ENCOUNTER — TELEPHONE (OUTPATIENT)
Dept: PSYCHIATRY | Facility: CLINIC | Age: 33
End: 2023-10-20
Payer: MEDICARE

## 2023-10-20 NOTE — TELEPHONE ENCOUNTER
"Returned the call from . Reported from what nursing staff noted about GH indicated that pt has broken into  medication room and took medications. Pt has hx of taking other people's medications. Discussed this is very concerning. CM plans to accompany pt at next visit on 10/31. CM noted pt wanted to discuss \"drastic\" medication changes as pt does not feel medications are working. Discussed with CM that in last visit, pt noted he is only taking Seroquel and discontinued all other medications. Since  is not managing pt's medications, it is difficult to know if pt is taking what medications and what dose with what frequency.  CM was not aware of pt broke into  medication rooms. CM plans to report GH.      CM still does not want this writer to disclose pt report of daily substance use to CM while denying this to this provider. Discussed that this provider can recommend UTOX since we are not sure what medications pt are taking and how often at this time. FLORIDA agrees with this.     FLORIDA also notes pt does not want to move from current  though he wants to live in independent apt.    Regine Last, CNP, 10/20/2023      "

## 2023-10-23 ENCOUNTER — MYC MEDICAL ADVICE (OUTPATIENT)
Dept: FAMILY MEDICINE | Facility: CLINIC | Age: 33
End: 2023-10-23
Payer: MEDICARE

## 2023-10-23 DIAGNOSIS — G89.29 CHRONIC LEFT-SIDED LOW BACK PAIN WITH LEFT-SIDED SCIATICA: ICD-10-CM

## 2023-10-23 DIAGNOSIS — M51.369 DDD (DEGENERATIVE DISC DISEASE), LUMBAR: ICD-10-CM

## 2023-10-23 DIAGNOSIS — E11.65 TYPE 2 DIABETES MELLITUS WITH HYPERGLYCEMIA, WITHOUT LONG-TERM CURRENT USE OF INSULIN (H): ICD-10-CM

## 2023-10-23 DIAGNOSIS — M54.42 CHRONIC LEFT-SIDED LOW BACK PAIN WITH LEFT-SIDED SCIATICA: ICD-10-CM

## 2023-10-23 RX ORDER — GABAPENTIN 600 MG/1
1200 TABLET ORAL 3 TIMES DAILY
Qty: 180 TABLET | Refills: 0 | Status: ON HOLD | OUTPATIENT
Start: 2023-10-23 | End: 2023-11-27

## 2023-10-26 ENCOUNTER — HOSPITAL ENCOUNTER (EMERGENCY)
Facility: CLINIC | Age: 33
Discharge: HOME | End: 2023-10-29
Attending: EMERGENCY MEDICINE | Admitting: EMERGENCY MEDICINE
Payer: MEDICARE

## 2023-10-26 DIAGNOSIS — F29 PSYCHOSIS, UNSPECIFIED PSYCHOSIS TYPE (H): ICD-10-CM

## 2023-10-26 LAB
ALBUMIN SERPL BCG-MCNC: 4.5 G/DL (ref 3.5–5.2)
ALP SERPL-CCNC: 83 U/L (ref 35–129)
ALT SERPL W P-5'-P-CCNC: 50 U/L (ref 0–70)
ANION GAP SERPL CALCULATED.3IONS-SCNC: 15 MMOL/L (ref 7–15)
AST SERPL W P-5'-P-CCNC: 53 U/L (ref 0–45)
BASOPHILS # BLD AUTO: 0.1 10E3/UL (ref 0–0.2)
BASOPHILS NFR BLD AUTO: 0 %
BILIRUB SERPL-MCNC: 0.2 MG/DL
BUN SERPL-MCNC: 25.7 MG/DL (ref 6–20)
CALCIUM SERPL-MCNC: 9.7 MG/DL (ref 8.6–10)
CHLORIDE SERPL-SCNC: 104 MMOL/L (ref 98–107)
CREAT SERPL-MCNC: 0.94 MG/DL (ref 0.51–1.17)
DEPRECATED HCO3 PLAS-SCNC: 20 MMOL/L (ref 22–29)
EGFRCR SERPLBLD CKD-EPI 2021: 82 ML/MIN/1.73M2
EOSINOPHIL # BLD AUTO: 0.1 10E3/UL (ref 0–0.7)
EOSINOPHIL NFR BLD AUTO: 0 %
ERYTHROCYTE [DISTWIDTH] IN BLOOD BY AUTOMATED COUNT: 15.6 % (ref 10–15)
ETHANOL SERPL-MCNC: <0.01 G/DL
GLUCOSE SERPL-MCNC: 144 MG/DL (ref 70–99)
HCT VFR BLD AUTO: 42.1 % (ref 35–53)
HGB BLD-MCNC: 14.1 G/DL (ref 11.7–17.7)
IMM GRANULOCYTES # BLD: 0.1 10E3/UL
IMM GRANULOCYTES NFR BLD: 1 %
LACTATE SERPL-SCNC: 2.4 MMOL/L (ref 0.7–2)
LYMPHOCYTES # BLD AUTO: 3.8 10E3/UL (ref 0.8–5.3)
LYMPHOCYTES NFR BLD AUTO: 28 %
MCH RBC QN AUTO: 28.3 PG (ref 26.5–33)
MCHC RBC AUTO-ENTMCNC: 33.5 G/DL (ref 31.5–36.5)
MCV RBC AUTO: 84 FL (ref 78–100)
MONOCYTES # BLD AUTO: 0.7 10E3/UL (ref 0–1.3)
MONOCYTES NFR BLD AUTO: 5 %
NEUTROPHILS # BLD AUTO: 8.9 10E3/UL (ref 1.6–8.3)
NEUTROPHILS NFR BLD AUTO: 66 %
NRBC # BLD AUTO: 0 10E3/UL
NRBC BLD AUTO-RTO: 0 /100
PLATELET # BLD AUTO: 302 10E3/UL (ref 150–450)
POTASSIUM SERPL-SCNC: 3.7 MMOL/L (ref 3.4–5.3)
PROT SERPL-MCNC: 7.6 G/DL (ref 6.4–8.3)
RBC # BLD AUTO: 4.99 10E6/UL (ref 3.8–5.9)
SODIUM SERPL-SCNC: 139 MMOL/L (ref 135–145)
WBC # BLD AUTO: 13.7 10E3/UL (ref 4–11)

## 2023-10-26 PROCEDURE — 85025 COMPLETE CBC W/AUTO DIFF WBC: CPT | Performed by: EMERGENCY MEDICINE

## 2023-10-26 PROCEDURE — 99291 CRITICAL CARE FIRST HOUR: CPT | Mod: 25 | Performed by: EMERGENCY MEDICINE

## 2023-10-26 PROCEDURE — 96372 THER/PROPH/DIAG INJ SC/IM: CPT | Performed by: EMERGENCY MEDICINE

## 2023-10-26 PROCEDURE — 82077 ASSAY SPEC XCP UR&BREATH IA: CPT | Performed by: EMERGENCY MEDICINE

## 2023-10-26 PROCEDURE — 80053 COMPREHEN METABOLIC PANEL: CPT | Performed by: EMERGENCY MEDICINE

## 2023-10-26 PROCEDURE — 36415 COLL VENOUS BLD VENIPUNCTURE: CPT | Performed by: EMERGENCY MEDICINE

## 2023-10-26 PROCEDURE — 250N000013 HC RX MED GY IP 250 OP 250 PS 637: Performed by: EMERGENCY MEDICINE

## 2023-10-26 PROCEDURE — 99291 CRITICAL CARE FIRST HOUR: CPT | Performed by: EMERGENCY MEDICINE

## 2023-10-26 PROCEDURE — 250N000011 HC RX IP 250 OP 636: Mod: JZ | Performed by: EMERGENCY MEDICINE

## 2023-10-26 PROCEDURE — 83605 ASSAY OF LACTIC ACID: CPT | Performed by: EMERGENCY MEDICINE

## 2023-10-26 RX ORDER — QUETIAPINE FUMARATE 50 MG/1
50 TABLET, FILM COATED ORAL DAILY
COMMUNITY
Start: 2023-10-11 | End: 2023-10-31

## 2023-10-26 RX ORDER — OLANZAPINE 10 MG/2ML
10 INJECTION, POWDER, FOR SOLUTION INTRAMUSCULAR ONCE
Status: COMPLETED | OUTPATIENT
Start: 2023-10-26 | End: 2023-10-26

## 2023-10-26 RX ORDER — QUETIAPINE FUMARATE 200 MG/1
2.5 TABLET, FILM COATED ORAL DAILY
COMMUNITY
Start: 2023-10-11 | End: 2023-10-31

## 2023-10-26 RX ORDER — AMLODIPINE BESYLATE 5 MG/1
10 TABLET ORAL DAILY
COMMUNITY
Start: 2023-10-22 | End: 2024-04-18

## 2023-10-26 RX ORDER — LORAZEPAM 2 MG/ML
1 INJECTION INTRAMUSCULAR ONCE
Status: COMPLETED | OUTPATIENT
Start: 2023-10-26 | End: 2023-10-26

## 2023-10-26 RX ADMIN — OLANZAPINE 10 MG: 10 INJECTION, POWDER, FOR SOLUTION INTRAMUSCULAR at 21:10

## 2023-10-26 RX ADMIN — NICOTINE POLACRILEX 4 MG: 4 GUM, CHEWING BUCCAL at 21:16

## 2023-10-26 RX ADMIN — OLANZAPINE 10 MG: 10 INJECTION, POWDER, FOR SOLUTION INTRAMUSCULAR at 19:00

## 2023-10-26 RX ADMIN — LORAZEPAM 1 MG: 2 INJECTION INTRAMUSCULAR; INTRAVENOUS at 19:10

## 2023-10-26 ASSESSMENT — ACTIVITIES OF DAILY LIVING (ADL)
ADLS_ACUITY_SCORE: 37

## 2023-10-27 LAB
AMPHETAMINES UR QL SCN: ABNORMAL
BARBITURATES UR QL SCN: ABNORMAL
BENZODIAZ UR QL SCN: ABNORMAL
BZE UR QL SCN: ABNORMAL
CANNABINOIDS UR QL SCN: ABNORMAL
FENTANYL UR QL: ABNORMAL
GLUCOSE BLDC GLUCOMTR-MCNC: 135 MG/DL (ref 70–99)
GLUCOSE BLDC GLUCOMTR-MCNC: 172 MG/DL (ref 70–99)
LACTATE SERPL-SCNC: 1.9 MMOL/L (ref 0.7–2)
OPIATES UR QL SCN: ABNORMAL
PCP QUAL URINE (ROCHE): ABNORMAL

## 2023-10-27 PROCEDURE — 82962 GLUCOSE BLOOD TEST: CPT

## 2023-10-27 PROCEDURE — 80307 DRUG TEST PRSMV CHEM ANLYZR: CPT | Performed by: EMERGENCY MEDICINE

## 2023-10-27 PROCEDURE — 83605 ASSAY OF LACTIC ACID: CPT | Performed by: EMERGENCY MEDICINE

## 2023-10-27 PROCEDURE — 36415 COLL VENOUS BLD VENIPUNCTURE: CPT | Performed by: EMERGENCY MEDICINE

## 2023-10-27 PROCEDURE — 250N000013 HC RX MED GY IP 250 OP 250 PS 637: Performed by: EMERGENCY MEDICINE

## 2023-10-27 RX ORDER — TESTOSTERONE 1.62 MG/G
2 GEL TRANSDERMAL DAILY
Status: ON HOLD | COMMUNITY
End: 2023-11-27

## 2023-10-27 RX ORDER — LORAZEPAM 1 MG/1
2 TABLET ORAL ONCE
Status: COMPLETED | OUTPATIENT
Start: 2023-10-27 | End: 2023-10-27

## 2023-10-27 RX ORDER — DIPHENHYDRAMINE HYDROCHLORIDE 50 MG/ML
25 INJECTION INTRAMUSCULAR; INTRAVENOUS ONCE
Status: DISCONTINUED | OUTPATIENT
Start: 2023-10-27 | End: 2023-10-27

## 2023-10-27 RX ORDER — DIPHENHYDRAMINE HCL 50 MG
50 CAPSULE ORAL ONCE
Status: COMPLETED | OUTPATIENT
Start: 2023-10-27 | End: 2023-10-27

## 2023-10-27 RX ORDER — LORAZEPAM 2 MG/ML
1 INJECTION INTRAMUSCULAR ONCE
Status: DISCONTINUED | OUTPATIENT
Start: 2023-10-27 | End: 2023-10-27

## 2023-10-27 RX ORDER — AMLODIPINE BESYLATE 5 MG/1
10 TABLET ORAL DAILY
Status: DISCONTINUED | OUTPATIENT
Start: 2023-10-27 | End: 2023-10-29 | Stop reason: HOSPADM

## 2023-10-27 RX ORDER — OLANZAPINE 10 MG/1
10 TABLET, ORALLY DISINTEGRATING ORAL ONCE
Status: COMPLETED | OUTPATIENT
Start: 2023-10-27 | End: 2023-10-27

## 2023-10-27 RX ORDER — DIPHENHYDRAMINE HCL 25 MG
25 CAPSULE ORAL ONCE
Status: COMPLETED | OUTPATIENT
Start: 2023-10-27 | End: 2023-10-27

## 2023-10-27 RX ADMIN — NICOTINE POLACRILEX 4 MG: 4 GUM, CHEWING BUCCAL at 08:19

## 2023-10-27 RX ADMIN — AMLODIPINE BESYLATE 10 MG: 5 TABLET ORAL at 23:38

## 2023-10-27 RX ADMIN — OLANZAPINE 10 MG: 10 TABLET, ORALLY DISINTEGRATING ORAL at 07:59

## 2023-10-27 RX ADMIN — NICOTINE POLACRILEX 4 MG: 4 GUM, CHEWING BUCCAL at 17:54

## 2023-10-27 RX ADMIN — LORAZEPAM 2 MG: 1 TABLET ORAL at 09:10

## 2023-10-27 RX ADMIN — NICOTINE POLACRILEX 4 MG: 4 GUM, CHEWING BUCCAL at 19:41

## 2023-10-27 RX ADMIN — OLANZAPINE 10 MG: 10 TABLET, ORALLY DISINTEGRATING ORAL at 12:52

## 2023-10-27 RX ADMIN — LORAZEPAM 2 MG: 1 TABLET ORAL at 19:38

## 2023-10-27 RX ADMIN — NICOTINE POLACRILEX 4 MG: 4 GUM, CHEWING BUCCAL at 22:45

## 2023-10-27 RX ADMIN — DIPHENHYDRAMINE HYDROCHLORIDE 50 MG: 50 CAPSULE ORAL at 14:33

## 2023-10-27 RX ADMIN — NICOTINE POLACRILEX 4 MG: 4 GUM, CHEWING BUCCAL at 12:58

## 2023-10-27 RX ADMIN — LORAZEPAM 2 MG: 1 TABLET ORAL at 14:33

## 2023-10-27 RX ADMIN — QUETIAPINE FUMARATE 500 MG: 400 TABLET ORAL at 23:38

## 2023-10-27 RX ADMIN — DIPHENHYDRAMINE HYDROCHLORIDE 25 MG: 25 CAPSULE ORAL at 19:38

## 2023-10-27 ASSESSMENT — ACTIVITIES OF DAILY LIVING (ADL)
ADLS_ACUITY_SCORE: 37

## 2023-10-27 NOTE — DISCHARGE INSTRUCTIONS
Aftercare Plan  Follow up with psychiatry and CM on Tuesday 10/31/23.     to continue referrals to therapy.        If I am feeling unsafe or I am in a crisis, I will:   Contact my established care providers   Call the National Suicide Prevention Lifeline: 699.391.8326   Go to the nearest emergency room   Call 911     Warning signs that I or other people might notice when a crisis is developing for me:     Increase in feelings of paranoia.    Increase in feelings of anxiety  Not taking care of self  Not sleeping   I am having increasing suicidal thoughts that turn to plans with intent or means  I am having additional urges to self-harm    My emotions are of hopelessness; feeling like there's no way out.  Rage or anger.  Engaging in risky activities without thinking  Withdrawing from family/friends  Dramatic mood swings  Drastic personality changes   Use of alcohol or drugs  Postings on social media  Neglect of personal hygiene or cares     Things I am able to do on my own to cope or help me feel better:    Spending quality time with loved ones  Staying hydrated  Eating balanced meals  Going for a walk every day  Take care of daily responsibilities/needs  Focus on positive self-talk vs negative self-talk    Things that I am able to do with others to cope or help me better:   Exercise  Music  Deep breathing  Meditations  Journal  Self-regulate  Self check-in  Ask for help    Things I can use or do for distraction:   Reach out to/spend time with family, friends  Shower  Exercise  Chores or do a project  Listen to music  Watch movie/TV  Listening to music  Journaling  Reading a book  Meditating  Call a friend    Changes I can make to support my mental health and wellness:    -I will abstain from all mood altering chemicals not currently prescribed to me   -I will attend scheduled mental health therapy and psychiatric appointments and follow all   recommendations  -I will commit to 30 minutes of self care  daily - this can be as simple as taking a shower, going for a   walk, cooking a meal, read, writing, etc  -I will practice square breathing when I begin to feel anxious - in breath through the nose for the count   of 4 and the first line on the square. Out breath through the mouth for the count of 4 for the second line   of the square. Repeat to complete the square. Repeat the square as many times as needed.  - I will use distraction skills of: going for walks, watching TV, spending time outside, calling a friend or   family member  -Use community resources, including hotline numbers, [facility] crisis and support meetings  -Maintain a daily schedule/routine  -Practice deep breathing skills  -Download a meditation hiren and spend 15-20 minutes per day mediating/relaxing. Some apps to   download include: Calm, Headspace and Insight Timer. All 3 of these apps have free version    Reduce Extreme Emotion  QUICKLY:  Changing Your Body Chemistry      T:  Change your body Temperature to change your autonomic nervous system   Use Ice Water to calm yourself down FAST   Put your face in a bowl of ice water (this is the best way; have the person keep his/her face in ice water for 30-45 seconds - initial research is showing that the longer s/he can hold her/his face in the water, the better the response), or   Splash ice water on your face, or hold an ice pack on your face      I:  Intensely exercise to calm down a body revved up by emotion   Examples: running, walking fast, jumping, playing basketball, weight lifting, swimming, calisthenics, etc.   Engage in exercises that DO NOT include violent behaviors. Exercises that utilize violent behaviors tend to function as  behavioral rehearsal,  and rather than calming the person down, may actually  rev  the person up more, increasing the likelihood of violence, and lessening the likelihood that they will  burn off  energy     P:  Progressively relax your muscles   Starting with your  hands, moving to your forearms, upper arms, shoulders, neck, forehead, eyes, cheeks and lips, tongue and teeth, chest, upper back, stomach, buttocks, thighs, calves, ankles, feet   Tense (10 seconds,   of the way), then relax each muscle (all the way)   Notice the tension   Notice the difference when relaxed (by tensing first, and then relaxing, you are able to get a more thorough relaxation than by simply relaxing)      P: Paced breathing to relax   The standard technique is to begin with counting the number of steps one takes for a typical inhale, then counting the steps one takes for a typical exhale, and then lengthening the amount of steps for the exhalation by one or two steps.  OR  Repeat this pattern for 1-2 minutes  Inhale for four (4) seconds   Exhale for six (6) to eight (8) seconds   Research demonstrated that one can change one's overall level of anxiety by doing this exercise for even a few minutes per day      People in my life that I can ask for help:   Family  Friends  Providers    Your [facility] has a mental health crisis team you can call 24/7:   [facility] Crisis Line Number: [phone]  [facility] Mental Health Crisis: [phone] - Call the crisis line for immediate mental health support, 24 hours a day.   [facility] Crisis Line Number: [phone]  [facility] Crisis Line Number: [phone]  [facility] Crisis Line Number: [phone]   [facility] Crisis Line Number: [phone]  [place]: [phone]  [place]: [phone]  [facility] Crisis Number: [phone]  [facility] Crisis: [phone]  [facility] Crisis: [phone]    Other things that are important when I'm in crisis:   Ask for help    Additional resources and information:     Mental Health Apps  My3  https://myProxToMepp.org/    VirtualHopeBox  https://Xeko.org/apps/virtual-hope-box/       Professionals or Agencies I Can Contact During A Crisis:    "    Crisis Lines  Call or Text 598 - National Suicide and Crisis Lifeline    Crisis Text Line  Text 336044  You will be connected with a trained live crisis counselor to provide support.    [employer] (LGBTQ Youth Crisis Line)  1.724.545.9312  text START to 968-302    National [place] [employer] Mental Illness (GILMA)  594.560.6371 or 5.058.GILMA.HELPS    National Suicide Prevention Lifeline at 0-365-468-DULP (0791)     Throughout  [place]: call **CRISIS (**270120)     Crisis Text Line: is available for free, 24/7 by texting [place] to 294086    Community Resources  Fast Tracker  Linking people to mental health and substance use disorder resources  Grow.org     [place] Mental Health Warm Line  Peer to peer support  Monday thru Saturday, 12 pm to 10 pm  635.229.8434 or 1.640.137.6674  Text \"Support\" to 66034     [place] on Mental Illness (www.[place].gilma.org): 402.242.8482 or 400-686-2837     [facility] Phone (free remote counseling): 723.594.5822 Web address:   https://what3words.org/     www.NPR (filter for insurance, gender preference, etc.)    [facility]   [phone]  Intake appointment will be virtual, following appointments can be in person or virtual.   **IMMEDIATE OPENINGS**    [facility]  [phone]  *offers individual therapy, medication management and Mental Health Case Workers; can self refer    [facility]  [phone]  *Immediate Openings    [facility]  [phone]  *Immediate Openings    [facility]  [phone]  *Immediate Openings    Please follow up with scheduled providers to ensure all necessary paperwork is filled out prior to your   scheduled telehealth appointments.     Coordinators from [facility] will be calling within two business days to ensure   that you have the resources you may need or provide assistance with scheduling (Phone " number: [phone].).    Remember: give the referrals 3 sessions prior to calling it quits. Do you trust them? Do you feel   understood? Do you think they can help? Check in with yourself after each session    Please reach out to the [facility]([phone]) regarding further mental health appointment needs for this emergency department visit.    [facility] SCHEDULING:  Today you were seen by a licensed mental health professional through [facility], [facility] ([facility])  for a crisis assessment in the Emergency Department at [facility].  It is recommended that you follow up with your estabished providers (psychiatrist, menta health therapist, and/or primary care doctor - as relevant) as soon as possible. Coordinators from [facility] will be calling you in the next 24-48 hours to ensure that you have the resources you need.  You can so contact [facility] coordinators directly at [phone].     [facility] maintains an extensive network of licensed behavioral health providers to connect patients with the services they need.  We do not charge providers a fee to participate in our referral network.  We match patients with providers based on a patient s specific needs, insurance coverage, and location.  Our first effort will be to refer you to a provider within your care system, and will utilize providers outside your care system as needed.

## 2023-10-27 NOTE — ED NOTES
Pt is very anxious in room pacing.  Pt took oral zyprexa.  Pt admits to hearing voices telling her to run.  Pt is cooperative and redirectable.  Unable to get BP due to patient too anxious to sit still.

## 2023-10-27 NOTE — CONSULTS
Diagnostic Evaluation Consultation  Crisis Assessment    Patient Name: Ayana Prasad  Age:  33 year old  Legal Sex: female  Gender Identity: transgender male  Pronouns: he/him/his  Race: White  Ethnicity: Not  or   Language: English      Patient was assessed: In person      Patient location: [facility] EMERGENCY DEPARTMENT                             ED14    Referral Data and Chief Complaint  Ayana Prasad presents to the ED per community partner(s) (via car with group home staff). Patient is presenting to the ED for the following concerns: Significant behavioral change, Paranoia, Substance use.   Factors that make the mental health crisis life threatening or complex are:  Patient is paranoid that people are after them with guns. Patient has a history of schizoaffective. Group home reports patient is taking medications. Group Staff reports patient going to the park and coming home with paranoid thoughts and pacing all over, unable to sit still. Group home staff believe patient may be under the influence of something..      Informed Consent and Assessment Methods  Explained the crisis assessment process, including applicable information disclosures and limits to confidentiality, assessed understanding of the process, and obtained consent to proceed with the assessment.  Assessment methods included conducting a formal interview with patient, review of medical records, collaboration with medical staff, and obtaining relevant collateral information from family and community providers when available.  : done     Patient response to interventions: acceptance expressed, verbalizes understanding  Coping skills were attempted to reduce the crisis:  Pt denies attempting any use of coping skills prior to arrival to the ED. They do not feel anything other than medication is helpful to provide relief for their symptoms at this time.     History of the Crisis   Pt has a hx of schizoaffective disorder,  "bipolar type, borderline personality disorder, AVA, ADHD, PTSD and polysubstance abuse (THC, methamphetamines and opioids). Pt currently under MI commitment through [facility]. Pt was brought into the ED this evening by his group home staff due to concerns for paranoia, racing thoughts and psychomotor agitation. His staff report that pt was found at the park with two individuals and they noticed this change in behavior once they picked him up and brought him home. They report pt was doing fine up until today when they picked him up. They have concerns for substance abuse. During assessment, pt observed to be pacing back in forth in his room, clapping, repeating \"damn, damn, damn.\" Pt appears anxious and agitated. Pt had attempted to elope immediately upon arrival to the ED, but was agreeable to staying in room 14 and engaging in assessment, though they expressed difficulty in answering questions due to racing thoughts. Pt went towards the door on occasion, but was redirectable by staff to stay in the room. Pt is oriented to himself, time/day, location and how he got here, but has difficulty describing what happened today that led him to coming in and states \"I don't know, I lost my mind.\" Endorses daily medical THC use, but denies any other recent substance use. Reports he is not sure if he was at the park with anyone else today. Denies SI, HI, NSSI. He does endorse AH, but has difficulty describing the content of the voices though denies they are command in nature. Denies VH, but reports \"No one believes me, but there are people following me with a gun and I can see them out of the window.\" Asked pt to identify which window pt was seeing this person through and pt identified the window on door 14 that was opened and pressed against the wall so all that could be seen through it was the wall. Pt reports he has not eaten anything today and does not remember last time he ate. Reports today is day 4 that he has not " slept. He reports his provider discontinued all of his medications and the only thing he is taking now is jardiance. Writer asked if there was anything they could do that would be helpful to ground the patient. Pt expressed he just wanted medication. He understood he had received IM lorazepam 1 mg and olanzapine 10 mg prior to assessment, and was agreeable to waiting to allow these medications to take effect. Pt reported he would like to continue pacing in his room for now.    Brief Psychosocial History  Family:  Single, Children no  Support System:  Facility resident(s)/Staff  Employment Status:  unemployed  Source of Income:  other (see comments) (unknown)  Financial Environmental Concerns:  none  Current Hobbies:  other (see comments) (pt denied any hobbies or interests at this time)  Barriers in Personal Life:  mental health concerns, behavioral concerns    Significant Clinical History  Current Anxiety Symptoms:  anxious, racing thoughts  Current Depression/Trauma:  impaired decision making  Current Somatic Symptoms:  anxious, racing thoughts  Current Psychosis/Thought Disturbance:  auditory hallucinations, visual hallucinations, hyperverbal, agitation, hyperactive (paranoia)  Current Eating Symptoms:  loss of appetite  Chemical Use History:  Alcohol:  (unknown, pt denies)  Benzodiazepines:  (unknown, pt denies)  Opiates:  (unknown, pt denies)  Cocaine:  (unknown, pt denies)  Marijuana: Daily  Last Use::  (pt is not sure last date of use)  Other Use:  (unknown, pt denies)  Withdrawal Symptoms:  (unknown)  Addictions:  (unknown)   Past diagnosis:  Bipolar Disorder, Schizophrenia, Substance Use Disorder, Personality Disorder, Suicide attempt(s), PTSD, Anxiety Disorder, ADHD  Family history:  No known history of mental health or chemical health concerns  Past treatment:  Individual therapy, Case management, Civil Commitment, Day Treatment, Inpatient Hospitalization, Primary Care, Supportive Living Environment  (group home, residential house, etc)  Details of most recent treatment:  Pt was most recently IP MH at [facility] from 8/10-8/28/23 for SI and 5/24/23-6/9/23 for SI. Currently resides at a group home. Has PCP and psychiatry. Pt is currently under MI commitment through [facility]. Pt reportedly self-administers medication, per chart review it appears pt had requested discontinuation of medications except for seroquel which they felt was helpful. During assessment though, pt reports only taking jardiance at this time.     Collateral Information  Is there collateral information: Yes     Collateral information name, relationship, phone number:  [facility] , [phone]    What happened today: He was fine until he went to the park. We spoke to him a few hours before. Now hearing voices and seeing things, doesn't make any sense. Won't sit still, thinks someone after him. I can't tell what really happened, never seen him running around like this. Also administers medication himself. When I went to pick pt up at the park he was with two other guys at the park, not sure what they were doing. Concerned about substance use. Pt is welcome to return if they are feeling better and discharge is recommended.     What is different about patient's functioning: AH, VH, paranoia, hyperverbal and hyperactive     Has patient made comments about wanting to kill themselves/others: no    If d/c is recommended, can they take part in safety/aftercare planning:  yes    Risk Assessment  [place] Suicide Severity Rating Scale Full Clinical Version:  Suicidal Ideation  Q1 Wish to be Dead (Lifetime): Yes  Q2 Non-Specific Active Suicidal Thoughts (Lifetime): Yes  3. Active Suicidal Ideation with any Methods (Not Plan) Without Intent to Act (Lifetime): Yes  Q4 Active Suicidal Ideation with Some Intent to Act, Without Specific Plan (Lifetime): Yes  Q5 Active Suicidal Ideation with Specific Plan and Intent (Lifetime): Yes  Q6  Suicide Behavior (Lifetime): yes     Suicidal Behavior (Lifetime)  Actual Attempt (Lifetime): Yes  Total Number of Actual Attempts (Lifetime): 4  Has subject engaged in non-suicidal self-injurious behavior? (Lifetime): No  Interrupted Attempts (Lifetime): No  Aborted or Self-Interrupted Attempt (Lifetime): No  Preparatory Acts or Behavior (Lifetime): Yes  Total Number of Preparatory Acts (Lifetime): 1  Preparatory Acts or Behavior Description (Lifetime): have asked people for weapons in the past, not recently    [place] Suicide Severity Rating Scale Recent:   Suicidal Ideation (Recent)  Q1 Wished to be Dead (Past Month): no  Q2 Suicidal Thoughts (Past Month): no     Suicidal Behavior (Recent)  Actual Attempt (Past 3 Months): No  Total Number of Actual Attempts (Past 3 Months): 0  Has subject engaged in non-suicidal self-injurious behavior? (Past 3 Months): No  Interrupted Attempts (Past 3 Months): No  Total Number of Interrupted Attempts (Past 3 Months): 0  Aborted or Self-Interrupted Attempt (Past 3 Months): No  Total Number of Aborted or Self-Interrupted Attempts (Past 3 Months): 0  Preparatory Acts or Behavior (Past 3 Months): No  Total Number of Preparatory Acts (Past 3 Months): 0    Environmental or Psychosocial Events: impulsivity/recklessness, ongoing abuse of substances  Protective Factors: Protective Factors: strong bond to family unit, community support, or employment, lives in a responsibly safe and stable environment, supportive ongoing medical and mental health care relationships, help seeking    Does the patient have thoughts of harming others? Feels Like Hurting Others: no  Previous Attempt to Hurt Others: no  Current presentation:  (psychomotor agitation, anxious, paranoid)  Is the patient engaging in sexually inappropriate behavior?: no    Is the patient engaging in sexually inappropriate behavior?  no        Mental Status Exam   Affect: Other (anxious, distressed)  Appearance:  "Appropriate  Attention Span/Concentration: Attentive  Eye Contact: Variable    Fund of Knowledge: Appropriate   Language /Speech Content: Fluent  Language /Speech Volume: Normal  Language /Speech Rate/Productions: Hyperverbal  Recent Memory: Variable  Remote Memory: Variable  Mood: Anxious  Orientation to Person: Yes   Orientation to Place: Yes  Orientation to Time of Day: Yes  Orientation to Date: Yes     Situation (Do they understand why they are here?): Answer (please comment) (pt is not sure, reports \"I don't know, I lost my mind.\")  Psychomotor Behavior: Hyperactive  Thought Content: Delusions, Hallucinations, Paranoia  Thought Form: Paranoia     Medication  Psychotropic medications: pt reports he is only taking jardiance. Reports he had his provider discontinue all medications. Per chart review, pt is still supposed to be on seroquel, but states he is not taking this.     No current facility-administered medications for this encounter.     Current Outpatient Medications   Medication    amLODIPine (NORVASC) 5 MG tablet    QUEtiapine (SEROQUEL) 200 MG tablet    QUEtiapine (SEROQUEL) 50 MG tablet    ACE/ARB/ARNI NOT PRESCRIBED (INTENTIONAL)    blood glucose (NO BRAND SPECIFIED) test strip    Continuous Blood Gluc  (FREESTYLE COLTEN 2 READER) ZEINAB    Continuous Blood Gluc Sensor (FREESTYLE COLTEN 2 SENSOR) MISC    doxycycline monohydrate (MONODOX) 100 MG capsule    empagliflozin (JARDIANCE) 10 MG TABS tablet    gabapentin (NEURONTIN) 600 MG tablet    insulin glargine (LANTUS PEN) 100 UNIT/ML pen    insulin pen needle (BD SAMANTHA U/F) 32G X 4 MM miscellaneous    omeprazole (PRILOSEC) 20 MG DR capsule    ondansetron (ZOFRAN ODT) 4 MG ODT tab    rosuvastatin (CRESTOR) 40 MG tablet    testosterone (ANDROGEL 1.62 % PUMP) 20.25 MG/ACT gel    thin (NO BRAND SPECIFIED) lancets         Current Care Team  Patient Care Team:  Becki Avery APRN CNP as PCP - General (Family Medicine)  Kathie Sandhu MD " as MD (Neurology)  Tessa Galvan, RN as Specialty Care Coordinator  Ashu Russell DO as MD (Psychiatry & Neurology - Neurology)  Becki Avery APRN CNP as Assigned PCP  Demsond West as Specialty Care Coordinator (Plastic Surgery)  Harvey Negron MD as MD (Psychiatry)  Dora Mazariegos, PhD (Student in organized health care education/training program)  Glenda Juan MD as Resident (Family Medicine)  Ayana  as   Regine Last APRN CNP as Nurse Practitioner (Psychiatry)  Oswaldo Amado MD as MD (Dermatology)  Regine Lsat APRN CNP as Assigned Behavioral Health Provider  Mark Cleary MD as MD (Dermatology)  Lucie Chan, RN as Specialty Care Coordinator (Psychiatry)  Ashu Russell DO as MD (Neurology)  Luz Moncada APRN CNP as Assigned Neuroscience Provider  Alice Tubbs [facility] as Pharmacist (Pharmacist)  Mark Cleary MD as Assigned Surgical Provider  Moriah Rojas [facility] as Pharmacist (Pharmacist)  Moriah Rojas [facility] as Assigned MTM Pharmacist  Leventhal, Thomas Michael, MD as MD (Gastroenterology)  Mark Cleary MD as MD (Dermatology)    Diagnosis  Patient Active Problem List   Diagnosis Code    ADHD (attention deficit hyperactivity disorder) F90.9    Bipolar 1 disorder, manic, mild F31.11    Marijuana abuse F12.10    Polysubstance abuse (H) F19.10    GERD (gastroesophageal reflux disease) K21.9    Tobacco abuse Z72.0    Intractable back pain M54.9    Optic neuritis H46.9    Cauda equina syndrome with neurogenic bladder (H) G83.4    Schizoaffective disorder, bipolar type (H) F25.0    PTSD (post-traumatic stress disorder) F43.10    Anxiety F41.9    Auditory hallucination R44.0    Nephrolithiasis N20.0    Cyst of left ovary N83.202    Borderline personality disorder (H) F60.3    Cannabis use disorder, severe, dependence (H) F12.20    Depression F32.A    Episodic mood  disorder (H24) F39    History of heroin abuse (H) F11.11    Moderate episode of recurrent major depressive disorder (H) F33.1    Opioid use disorder, severe, dependence (H) F11.20    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    Nausea R11.0    Overdose T50.901A    Bella (H) F30.9    Urinary retention R33.9    Chronic bilateral low back pain without sciatica M54.50, G89.29    AVA (generalized anxiety disorder) F41.1    Aggressive behavior R46.89    Gender identity disorder F64.9    Lumbosacral radiculopathy at L5 M54.17    DUB (dysfunctional uterine bleeding) N93.8    Seizure-like activity (H) R56.9    Acanthosis nigricans L83    Schizoaffective disorder, chronic condition with acute exacerbation (H) F25.9    Bipolar affective disorder, mixed, severe, with psychotic behavior (H) F31.64    Schizophrenia, schizoaffective, chronic with acute exacerbation (H) F25.9    Akathisia G25.71    Hypertension, unspecified type I10    Female-to-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 person Z78.9    Morbid obesity (H) E66.01    Diabetes mellitus, type 2 (H) E11.9    Suicidal ideation R45.851    Mood disorder due to a general medical condition F06.30       Primary Problem This Admission  Active Hospital Problems    *Schizoaffective disorder, bipolar type (H)        Clinical Summary and Substantiation of Recommendations   Pt presents to [facility] ED this evening from his group home due to paranoia, AH, and psychomotor agitation after they picked him up from the park with two strangers earlier today. Staff report pt seemed to be doing fine until after they picked him up and there is concern for possible substance use. A lower level of care has been unsuccessful in treating and stabilizing patient s mental health symptoms because of symptoms of psychosis, possibly substance-induced. However, with brief observation, monitored therapeutic treatment, and intervention of mental health symptoms in the ED, symptoms may be mitigated with  potential for disposition to a less restrictive level of care than an inpatient setting. Patient is not currently on the inpatient worklist. Next steps in care include reassessment in the morning with possible safety and aftercare planning if pt begins to clear and symptoms improve. If pt improves and is able to engage in safety planning, would recommend patient return to his group home. They are able to accept him back at any time. If pt's symptoms do not improve, recommend reassessment for possible IP MH. Per chart review from previous IP MH discharge summary, if IP MH is necessary, it is recommended stay is brief to reduce maladapative behaviors.                          Patient coping skills attempted to reduce the crisis:  Pt denies attempting any use of coping skills prior to arrival to the ED. They do not feel anything other than medication is helpful to provide relief for their symptoms at this time.    Disposition  Recommended disposition: Observation        Reviewed case and recommendations with attending provider. Attending Name: Dr. Isaiah Wilkinson       Attending concurs with disposition: yes       Patient and/or validated legal guardian concurs with disposition:   yes       Final disposition:  observation    Legal status on admission: Voluntary/Patient has signed consent for treatment    Assessment Details   Total duration spent with the patient: 15 min     CPT code(s) utilized: Non-Billable    MONIQUE Hill, Psychotherapist  [facility] - Triage & Transition Services  Callback: [phone]

## 2023-10-27 NOTE — ED NOTES
"Pt reporting people with guns after him. Pt arrived to room 14 very anxious, pacing back and forth and then attempted elope. Pt was redirected back to his room by security and nursing staff, MD presence at the time. Pt willingly to take IM medications- refer to [facility]. Pt continues pacing in room without engaging harm to self or others. 2010; pt stated, \"I cannot return to the  as there are people with guns and looking at me.\" Pt reports the IM medications didn't work and would like to try something else. MD aware. Fluid and food offered. Continues 1:1     "

## 2023-10-27 NOTE — ED PROVIDER NOTES
ED Observation  [facility]    Ayana Prasad MRN: [id number]   Age: 33 year old YOB: 1990     Interval History   About the same today.  Vitals signs stable.  Tolerating medications and treatment plan without significant side effects or problems.  Eating and voiding well.  No new concerns today.  Pt here for paranoid thoughts and may be taking drugs at the park.    Physical Exam   /65   Pulse 110   Temp 97.9  F (36.6  C) (Oral)   Resp 16   LMP  (LMP Unknown)   SpO2 98%   Physical Exam  Results   None today        Observation Course   Patient admitted to the ED Observation Unit with chest pain. Observation admission was performed to complete the evaluation of possible ACS, and for cardiac risk assessment.     Services consulted during the observation course: [facility] . Notable consultant recommendations include: pending    Through the patient's time in the ED Observation Unit, all diagnostic and therapeutic goals were met. See earlier notes for specific goals. Nursing notes reviewed.    Discharge Diagnoses:   Final diagnoses:   Psychosis, unspecified psychosis type (H)       Current Discharge Medication List        CONTINUE these medications which have NOT CHANGED    Details   amLODIPine (NORVASC) 5 MG tablet Take 10 mg by mouth daily      !! QUEtiapine (SEROQUEL) 200 MG tablet Take 2.5 tablets by mouth daily      !! QUEtiapine (SEROQUEL) 50 MG tablet Take 50 mg by mouth daily      ACE/ARB/ARNI NOT PRESCRIBED (INTENTIONAL) Please choose reason not prescribed from choices below.    Associated Diagnoses: Type 2 diabetes mellitus with hyperglycemia, without long-term current use of insulin (H)      blood glucose (NO BRAND SPECIFIED) test strip Use to test blood sugar one times daily and as needed. To accompany: Blood Glucose Monitor Brands: per insurance.  Qty: 100 strip, Refills: 3    Associated Diagnoses: Type 2 diabetes mellitus with hyperglycemia, without  long-term current use of insulin (H)      Continuous Blood Gluc  (FREESTYLE COLTEN 2 READER) ZEINAB Use to read blood sugars as per 's instructions.  Qty: 1 each, Refills: 0    Associated Diagnoses: Type 2 diabetes mellitus with hyperglycemia, without long-term current use of insulin (H)      Continuous Blood Gluc Sensor (FREESTYLE COLTEN 2 SENSOR) MISC Use to read blood sugars per 's instructions. Change sensor every 14 days.  Qty: 6 each, Refills: 0    Associated Diagnoses: Type 2 diabetes mellitus with hyperglycemia, without long-term current use of insulin (H)      doxycycline monohydrate (MONODOX) 100 MG capsule Take 1 capsule (100 mg) by mouth 2 times daily  Qty: 60 capsule, Refills: 0    Associated Diagnoses: Acne vulgaris      empagliflozin (JARDIANCE) 10 MG TABS tablet Take 1 tablet (10 mg) by mouth daily  Qty: 90 tablet, Refills: 0    Associated Diagnoses: Type 2 diabetes mellitus with hyperglycemia, without long-term current use of insulin (H)      gabapentin (NEURONTIN) 600 MG tablet Take 2 tablets (1,200 mg) by mouth 3 times daily  Qty: 180 tablet, Refills: 0    Associated Diagnoses: DDD (degenerative disc disease), lumbar; Chronic left-sided low back pain with left-sided sciatica      insulin glargine (LANTUS PEN) 100 UNIT/ML pen Inject 25 Units Subcutaneous every morning (before breakfast)  Qty: 30 mL, Refills: 1    Comments: If Lantus is not covered by insurance, may substitute Basaglar or Semglee or other insulin glargine product per insurance preference at same dose and frequency.  Hold Rx, will notify when needs to have refilled  Associated Diagnoses: Type 2 diabetes mellitus with hyperglycemia, without long-term current use of insulin (H)      insulin pen needle (BD SAMANTHA U/F) 32G X 4 MM miscellaneous Use 1 pen needles daily or as directed.  Qty: 100 each, Refills: 1    Associated Diagnoses: Type 2 diabetes mellitus with hyperglycemia, without long-term current use of  insulin (H)      omeprazole (PRILOSEC) 20 MG DR capsule Take 1 capsule (20 mg) by mouth daily  Qty: 30 capsule, Refills: 0    Associated Diagnoses: Gastroesophageal reflux disease without esophagitis      ondansetron (ZOFRAN ODT) 4 MG ODT tab Take 1 tablet (4 mg) by mouth daily as needed for nausea  Qty: 30 tablet, Refills: 0    Associated Diagnoses: Nausea and vomiting, unspecified vomiting type      rosuvastatin (CRESTOR) 40 MG tablet Take 1 tablet (40 mg) by mouth daily  Qty: 90 tablet, Refills: 3    Associated Diagnoses: Hyperlipidemia LDL goal <100      testosterone (ANDROGEL 1.62 % PUMP) 20.25 MG/ACT gel Place 2 Pump onto the skin daily for 30 days Apply from dispenser to clean, dry, intact skin of the upper arms and shoulders.  Qty: 75 g, Refills: 3    Comments: 1 pump = 1.25 g gel = 20.25 mg testosterone  Associated Diagnoses: Gender dysphoria      thin (NO BRAND SPECIFIED) lancets Use with lanceting device to check blood sugars once per day. To accompany: Blood Glucose Monitor Brands: per insurance.  Qty: 100 each, Refills: 3    Associated Diagnoses: Type 2 diabetes mellitus with hyperglycemia, without long-term current use of insulin (H)       !! - Potential duplicate medications found. Please discuss with provider.          --  MARLO LAKE MD  [facility] EMERGENCY DEPARTMENT  10/27/2023        Marlo Lake MD  10/27/23 1100

## 2023-10-27 NOTE — ED PROVIDER NOTES
ED PROVIDER NOTE  October 26, 2023  History     Chief Complaint   Patient presents with    Paranoid     Patient presents via car with group home staff. Patient is paranoid that people are after them with guns. Patient has a history of schizoaffective. Group home reports patient is taking medications. Group Staff reports patient going to the park and coming home with paranoid thoughts. Group home staff believe patient may be under the influence of something.     HPI  Ayana Prasad is a 33 year old adult who on chart review has a history significant for bipolar 1 disorder, borderline personality disorder, PTSD, arriving by EMS from the patient's group home secondary to paranoia.  Per EMS report patient transferred secondary to significant paranoia and delusions of patient with guns out to kill him.  I was asked by the charge nurse to see the patient immediately as the patient was trying to elope upon arrival.  Attempted verbal redirection unsuccessful and patient did require security to guide him back to the room.  I did assess the patient who is unable to provide any contributing history at this time.  Patient hyperverbal pacing and running back-and-forth in the room from [id number] appearing scared seeing individuals with guns in the window.  At this time no other immediate history available.      Past Medical History  Past Medical History:   Diagnosis Date    ADHD (attention deficit hyperactivity disorder)     Bipolar 1 disorder     Borderline personality disorder     Cauda equina syndrome     Chronic low back pain     Depression     Diabetes mellitus, type 2 (H) 1/19/2023    GERD (gastroesophageal reflux disease)     h/o TBI (traumatic brain injury)     Hypertension, unspecified type 12/16/2021    Marginal corneal ulcer, left 07/17/2015    Nephrolithiasis     obesity     Polysubstance abuse - methamphetamine, hallucinagen, heroin, marijuana     currently in remission    PONV (postoperative nausea and  vomiting)     PTSD (post-traumatic stress disorder)      Past Surgical History:   Procedure Laterality Date    BACK SURGERY  12/24/2016    BACK SURGERY - For Cauda Equina Syndrome  12/24/2016    COLONOSCOPY      COMBINED CYSTOSCOPY, INSERT STENT URETER(S) Left 8/30/2018    Procedure: COMBINED CYSTOSCOPY, INSERT STENT URETER(S);  Cystoscopy With Left Stent Placement;  Surgeon: Kiran Ulrich MD;  Location: [facility] OR    COMBINED CYSTOSCOPY, RETROGRADES, EXCHANGE STENT URETER(S) Left 10/14/2018    Procedure: COMBINED CYSTOSCOPY, RETROGRADES, EXCHANGE STENT URETER(S);  Cystoscopy and removal of left-sided stent.;  Surgeon: Stiven Olivo MD;  Location:  OR    COMBINED CYSTOSCOPY, RETROGRADES, URETEROSCOPY, INSERT STENT  1/6/2014    Procedure: COMBINED CYSTOSCOPY, RETROGRADES, URETEROSCOPY, INSERT STENT;  Cystyoscopy place left ureteral stent;  Surgeon: Jaun Kimble MD;  Location: [facility] OR    COMBINED CYSTOSCOPY, RETROGRADES, URETEROSCOPY, INSERT STENT Left 10/23/2018    Procedure: Video cystoscopy, left ureteroscopy with stone extraction;  Surgeon: Bull Mast MD;  Location:  OR    CYSTOSCOPY, URETEROSCOPY, COMBINED Right 9/23/2015    Procedure: COMBINED CYSTOSCOPY, URETEROSCOPY;  Surgeon: ROME Jett MD;  Location: [facility] OR    ENT SURGERY      ESOPHAGOSCOPY, GASTROSCOPY, DUODENOSCOPY (EGD), COMBINED  4/8/2013    Procedure: COMBINED ESOPHAGOSCOPY, GASTROSCOPY, DUODENOSCOPY (EGD), BIOPSY SINGLE OR MULTIPLE;  Gastroscopy;  Surgeon: Peter Pickard MD;  Location: [facility] GI    ESOPHAGOSCOPY, GASTROSCOPY, DUODENOSCOPY (EGD), COMBINED Left 10/28/2014    Procedure: COMBINED ESOPHAGOSCOPY, GASTROSCOPY, DUODENOSCOPY (EGD), BIOPSY SINGLE OR MULTIPLE;  Surgeon: Narcisa Ramirez MD;  Location:  OR    ESOPHAGOSCOPY, GASTROSCOPY, DUODENOSCOPY (EGD), COMBINED N/A 12/24/2018    Procedure: COMBINED ESOPHAGOSCOPY, GASTROSCOPY, DUODENOSCOPY (EGD), BIOPSY SINGLE OR MULTIPLE;  Surgeon: Sonu Verduzco MD;   Location: [facility] GI    INJECT EPIDURAL TRANSFORAMINAL LUMBAR / SACRAL EA ADDITIONAL LEVEL Left 3/18/2021    Procedure: Left L5/S1 transforaminal injection for selective L5 nerve root block;  Surgeon: Eliza Pagan MD;  Location: [facility] OR    LAPAROSCOPIC CHOLECYSTECTOMY  11/20/2014    [facility], Dr. Ramirez    LASER HOLMIUM LITHOTRIPSY URETER(S), INSERT STENT, COMBINED  4/2/2014    Procedure: COMBINED CYSTOSCOPY, URETEROSCOPY, LASER HOLMIUM LITHOTRIPSY URETER(S), INSERT STENT;  Cystoscopy,Left Ureteral Stent Removal,Left Ureteroscopy with Laser Lithotripsy,Left Ureter Stent Placement;  Surgeon: ROME Jett MD;  Location: [facility] OR    Transurethral stone resection  03/11/2011     ACE/ARB/ARNI NOT PRESCRIBED (INTENTIONAL)  amLODIPine (NORVASC) 10 MG tablet  blood glucose (NO BRAND SPECIFIED) test strip  Continuous Blood Gluc  (FREESTYLE COLTEN 2 READER) ZEINAB  Continuous Blood Gluc Sensor (FREESTYLE COLTEN 2 SENSOR) MISC  doxycycline monohydrate (MONODOX) 100 MG capsule  empagliflozin (JARDIANCE) 10 MG TABS tablet  gabapentin (NEURONTIN) 600 MG tablet  insulin glargine (LANTUS PEN) 100 UNIT/ML pen  insulin pen needle (BD SAMANTHA U/F) 32G X 4 MM miscellaneous  omeprazole (PRILOSEC) 20 MG DR capsule  ondansetron (ZOFRAN ODT) 4 MG ODT tab  QUEtiapine (SEROQUEL) 100 MG tablet  rosuvastatin (CRESTOR) 40 MG tablet  testosterone (ANDROGEL 1.62 % PUMP) 20.25 MG/ACT gel  thin (NO BRAND SPECIFIED) lancets      Allergies   Allergen Reactions    Haldol [Haloperidol] Other (See Comments)     Makes patient very angry and anxious    Adhesive Tape Hives    Percocet [Oxycodone-Acetaminophen] Nausea and Vomiting    Prednisone Other (See Comments) and Hives     Suicidal ideation    Risperidone Other (See Comments)    Tramadol Hcl Nausea and Vomiting    Droperidol Anxiety    Seroquel [Quetiapine] Palpitations     Spent 2 weeks in the hospital due to having seroquel, caused palpitations and QT prolongation     Family  History  Family History   Problem Relation Age of Onset    Gastrointestinal Disease Father         Crohn's Disease    Cancer Father         Liver Cancer    Other Cancer Father         Liver    Obesity Father     Cancer Maternal Grandmother         lympoma    Diabetes Maternal Grandmother     Mental Illness Maternal Grandmother     Other Cancer Maternal Grandmother         Non Hodgkins Lymphoma    Diabetes Maternal Grandfather     Hypertension Maternal Grandfather     Prostate Cancer Maternal Grandfather     Hyperlipidemia Maternal Grandfather     Heart Disease Paternal Grandfather         heart disease    Hypertension Brother     Other Cancer Brother         Esophagecial    Diabetes Brother     Obesity Brother     Hyperlipidemia Mother     Mental Illness Mother     Anxiety Disorder Mother     Anesthesia Reaction Mother         Vomiting/Nausea    Other Cancer Mother     Hypertension Brother     Other Cancer Brother     Other Cancer Paternal Half-Brother     No Known Problems Sister      Social History   Social History     Tobacco Use    Smoking status: Every Day     Packs/day: 0.50     Years: 5.00     Additional pack years: 0.00     Total pack years: 2.50     Types: Other, Cigarettes     Start date: 2011     Last attempt to quit: 2022     Years since quittin.1     Passive exposure: Never    Smokeless tobacco: Former     Types: Chew     Quit date: 2019    Tobacco comments:     Just nicotine gum currently. 23   Vaping Use    Vaping Use: Former    Substances: Nicotine, Flavoring    Devices: Refillable tank   Substance Use Topics    Alcohol use: No    Drug use: Not Currently     Types: Marijuana, Opiates     Comment: oxy, heroin (smoke)         A medically appropriate review of systems was performed with pertinent positives and negatives noted in the HPI, and all other systems negative.      Physical Exam   BP:  (restless)  Pulse: 115  Temp: 98.7  F (37.1  C)  Resp: 22  SpO2: 96 %      Physical  Exam  Vitals and nursing note reviewed.   Constitutional:       General: He is in acute distress.      Appearance: Normal appearance. He is not ill-appearing, toxic-appearing or diaphoretic.   HENT:      Head: Normocephalic and atraumatic.      Right Ear: External ear normal.      Left Ear: External ear normal.   Eyes:      Extraocular Movements: Extraocular movements intact.      Conjunctiva/sclera: Conjunctivae normal.      Pupils: Pupils are equal, round, and reactive to light.   Cardiovascular:      Rate and Rhythm: Tachycardia present.      Pulses: Normal pulses.      Heart sounds: Normal heart sounds. No murmur heard.     No friction rub.   Pulmonary:      Effort: Pulmonary effort is normal. No respiratory distress.      Breath sounds: No stridor. No wheezing, rhonchi or rales.   Musculoskeletal:         General: No deformity. Normal range of motion.      Cervical back: Normal range of motion.   Skin:     General: Skin is warm.      Capillary Refill: Capillary refill takes less than 2 seconds.      Coloration: Skin is not pale.      Findings: No bruising or erythema.   Neurological:      General: No focal deficit present.      Mental Status: He is alert.      Cranial Nerves: No cranial nerve deficit.      Motor: No weakness.   Psychiatric:         Attention and Perception: He perceives visual hallucinations.         Mood and Affect: Mood is anxious. Affect is labile.         Speech: Speech is rapid and pressured.         Behavior: Behavior is agitated and hyperactive.         Thought Content: Thought content is paranoid and delusional.         ED Course        Procedures         Results for orders placed or performed during the hospital encounter of 10/26/23 (from the past 24 hour(s))   CBC with platelets differential    Narrative    The following orders were created for panel order CBC with platelets differential.  Procedure                               Abnormality         Status                    "  ---------                               -----------         ------                     CBC with platelets and d...[[id number]]  Abnormal            Final result                 Please view results for these tests on the individual orders.   Comprehensive metabolic panel   Result Value Ref Range    Sodium 139 135 - 145 mmol/L    Potassium 3.7 3.4 - 5.3 mmol/L    Carbon Dioxide (CO2) 20 (L) 22 - 29 mmol/L    Anion Gap 15 7 - 15 mmol/L    Urea Nitrogen 25.7 (H) 6.0 - 20.0 mg/dL    Creatinine 0.94 0.51 - 1.17 mg/dL    GFR Estimate 82 >60 mL/min/1.73m2    Calcium 9.7 8.6 - 10.0 mg/dL    Chloride 104 98 - 107 mmol/L    Glucose 144 (H) 70 - 99 mg/dL    Alkaline Phosphatase 83 35 - 129 U/L    AST 53 (H) 0 - 45 U/L    ALT 50 0 - 70 U/L    Protein Total 7.6 6.4 - 8.3 g/dL    Albumin 4.5 3.5 - 5.2 g/dL    Bilirubin Total 0.2 <=1.2 mg/dL    Narrative    The generation of reference intervals for this test is currently based on binary male or female sex. If the electronic health record information indicates another gender identity or if Legal Sex is recorded as \"Unknown\", both male and female reference intervals are provided where applicable, and should be considered according to the individual's appropriate clinical context.   Ethyl Alcohol Level   Result Value Ref Range    Alcohol ethyl <0.01 <=0.01 g/dL   CBC with platelets and differential   Result Value Ref Range    WBC Count 13.7 (H) 4.0 - 11.0 10e3/uL    RBC Count 4.99 3.80 - 5.90 10e6/uL    Hemoglobin 14.1 11.7 - 17.7 g/dL    Hematocrit 42.1 35.0 - 53.0 %    MCV 84 78 - 100 fL    MCH 28.3 26.5 - 33.0 pg    MCHC 33.5 31.5 - 36.5 g/dL    RDW 15.6 (H) 10.0 - 15.0 %    Platelet Count 302 150 - 450 10e3/uL    % Neutrophils 66 %    % Lymphocytes 28 %    % Monocytes 5 %    % Eosinophils 0 %    % Basophils 0 %    % Immature Granulocytes 1 %    NRBCs per 100 WBC 0 <1 /100    Absolute Neutrophils 8.9 (H) 1.6 - 8.3 10e3/uL    Absolute Lymphocytes 3.8 0.8 - 5.3 10e3/uL    Absolute " "Monocytes 0.7 0.0 - 1.3 10e3/uL    Absolute Eosinophils 0.1 0.0 - 0.7 10e3/uL    Absolute Basophils 0.1 0.0 - 0.2 10e3/uL    Absolute Immature Granulocytes 0.1 <=0.4 10e3/uL    Absolute NRBCs 0.0 10e3/uL    Narrative    The generation of reference intervals for this test is currently based on binary male or female sex. If the electronic health record information indicates another gender identity or if Legal Sex is recorded as \"Unknown\", both male and female reference intervals are provided where applicable, and should be considered according to the individual's appropriate clinical context.     *Note: Due to a large number of results and/or encounters for the requested time period, some results have not been displayed. A complete set of results can be found in Results Review.     Medications   OLANZapine (zyPREXA) injection 10 mg (10 mg Intramuscular $Given 10/26/23 1900)   LORazepam (ATIVAN) injection 1 mg (1 mg Intramuscular $Given 10/26/23 1910)   OLANZapine (zyPREXA) injection 10 mg (10 mg Intramuscular $Given 10/26/23 2110)   nicotine polacrilex (NICORETTE) gum 4 mg (4 mg Buccal $Given 10/26/23 2116)             Critical care was performed.   Critical Care Addendum  My initial assessment, based on my review of prehospital provider report, review of nursing observations, review of vital signs, focused history, and physical exam, established a high suspicion that Ayana Prasad has  evidence of significant psychosis with ongoing paranoia and delusions , which requires immediate intervention, and therefore he is critically ill.     After the initial assessment, the care team required immediate show of force, temporary restraint by security to guide back to a safe room as well as intramuscular antipsychotic medications to provide stabilization care. Due to the critical nature of this patient, I reassessed multiple times prior to his disposition.     Time also spent performing chart review, ordering laboratory " studies, ordering intramuscular medications, EMS review, reassessment of patient, and obtaining collateral information.     Critical care time (excluding teaching time and procedures): 30 minutes.     Assessments & Plan (with Medical Decision Making)     Ayana Prasad is a 33 year old adult who on chart review has a history significant for bipolar 1 disorder, borderline personality disorder, PTSD, arriving by EMS from the patient's group home secondary to paranoia.  As per HPI I was asked to see this patient immediately as upon arrival patient attempting to elope.  Did attempt verbal redirection unsuccessfully.  Sure force of security did lead to patient being guided back to a secure room however patient again attempted to leave immediately.  I was able to get the patient back into the room.  The patient has significant ongoing paranoia and apparent visual hallucinations with delusions seeing an individual with a gun in the window not currently present.  The patient is hyperverbal with elevated psychomotor agitation pacing and running back-and-forth to the room tapping each wall concerning for a degree of psychosis NOS.  Patient not agreeable to oral medication and did initially request ketamine.  I do not believe this is optimal.  Patient agreeable to IM medication to de-escalate current psychosis.  Patient was placed on health officer hold upon arrival to gain time to better evaluate patient, review of chart, and obtain further information from collateral sources.  Presently patient is holdable and high risk for self-harm.    Patient did have a DEC assessment.  They were able to get collateral information that the patient was overall doing well until after being at the park.  Somewhat unusual with no sign of intracranial pathology or indications for CT imaging at this time.  No specific toxidrome but certainly possible.  Somewhat unusual with abrupt onset of psychosis.  We will plan to treat with antipsychotic  medication observe overnight with plan for reassessment by DEC team in a.m.  Patient currently remains holdable.    I have reviewed the nursing notes.    I have reviewed the findings, diagnosis, plan and need for follow up with the patient.    New Prescriptions    No medications on file       Final diagnoses:   Psychosis, unspecified psychosis type (H)       ISAIAH PUENTE MD    10/26/2023   [facility] EMERGENCY DEPARTMENT     Isaiah Puente MD  10/26/23 4335

## 2023-10-27 NOTE — PLAN OF CARE
Ayana J Shanice  October 26, 2023  Plan of Care Hand-off Note     Patient Care Path: observation    Plan for Care:   Pt presents to [facility] ED this evening from his group home due to paranoia, AH, and psychomotor agitation after they picked him up from the park with two strangers earlier today. Staff report pt seemed to be doing fine until after they picked him up and there is concern for possible substance use. A lower level of care has been unsuccessful in treating and stabilizing patient s mental health symptoms because of symptoms of psychosis, possibly substance-induced. However, with brief observation, monitored therapeutic treatment, and intervention of mental health symptoms in the ED, symptoms may be mitigated with potential for disposition to a less restrictive level of care than an inpatient setting. Patient is not currently on the inpatient worklist. Next steps in care include reassessment in the morning with possible safety and aftercare planning if pt begins to clear and symptoms improve. If pt improves and is able to engage in safety planning, would recommend patient return to his group home. They are able to accept him back at any time. If pt's symptoms do not improve, recommend reassessment for possible IP MH. Per chart review from previous IP MH discharge summary, if IP MH is necessary, it is recommended stay is brief to reduce maladapative behaviors.    Identified Goals and Safety Issues: crisis stabilization, pt presents with AH and paranoia - believes people with guns are out to kill him    Overview:  Domenica, , [phone] Negra Prasad, mother, [phone]     Dial numbers for the patient - do not hand them a phone and walk away    Legal Status: Legal Status at Admission: Voluntary/Patient has signed consent for treatment       Updated MD and RN regarding plan of care.           MONIQUE Hill

## 2023-10-28 LAB — GLUCOSE BLDC GLUCOMTR-MCNC: 156 MG/DL (ref 70–99)

## 2023-10-28 PROCEDURE — 250N000013 HC RX MED GY IP 250 OP 250 PS 637: Performed by: EMERGENCY MEDICINE

## 2023-10-28 PROCEDURE — 250N000011 HC RX IP 250 OP 636: Mod: JZ | Performed by: EMERGENCY MEDICINE

## 2023-10-28 PROCEDURE — 250N000012 HC RX MED GY IP 250 OP 636 PS 637: Performed by: EMERGENCY MEDICINE

## 2023-10-28 PROCEDURE — 82962 GLUCOSE BLOOD TEST: CPT

## 2023-10-28 PROCEDURE — 96372 THER/PROPH/DIAG INJ SC/IM: CPT | Performed by: EMERGENCY MEDICINE

## 2023-10-28 RX ORDER — HYDROXYZINE HYDROCHLORIDE 25 MG/1
25 TABLET, FILM COATED ORAL ONCE
Status: COMPLETED | OUTPATIENT
Start: 2023-10-28 | End: 2023-10-28

## 2023-10-28 RX ORDER — OLANZAPINE 5 MG/1
5 TABLET, ORALLY DISINTEGRATING ORAL AT BEDTIME
Status: DISCONTINUED | OUTPATIENT
Start: 2023-10-28 | End: 2023-10-28

## 2023-10-28 RX ORDER — OLANZAPINE 10 MG/2ML
10 INJECTION, POWDER, FOR SOLUTION INTRAMUSCULAR DAILY PRN
Status: DISCONTINUED | OUTPATIENT
Start: 2023-10-28 | End: 2023-10-29 | Stop reason: HOSPADM

## 2023-10-28 RX ORDER — OLANZAPINE 5 MG/1
5 TABLET, ORALLY DISINTEGRATING ORAL ONCE
Status: COMPLETED | OUTPATIENT
Start: 2023-10-28 | End: 2023-10-28

## 2023-10-28 RX ORDER — ZIPRASIDONE MESYLATE 20 MG/ML
20 INJECTION, POWDER, LYOPHILIZED, FOR SOLUTION INTRAMUSCULAR ONCE
Status: COMPLETED | OUTPATIENT
Start: 2023-10-28 | End: 2023-10-28

## 2023-10-28 RX ADMIN — NICOTINE POLACRILEX 4 MG: 4 GUM, CHEWING BUCCAL at 14:13

## 2023-10-28 RX ADMIN — OLANZAPINE 10 MG: 10 INJECTION, POWDER, LYOPHILIZED, FOR SOLUTION INTRAMUSCULAR at 15:37

## 2023-10-28 RX ADMIN — NICOTINE POLACRILEX 2 MG: 2 GUM, CHEWING BUCCAL at 12:46

## 2023-10-28 RX ADMIN — NICOTINE POLACRILEX 4 MG: 4 GUM, CHEWING BUCCAL at 15:30

## 2023-10-28 RX ADMIN — NICOTINE POLACRILEX 2 MG: 2 GUM, CHEWING BUCCAL at 18:14

## 2023-10-28 RX ADMIN — INSULIN GLARGINE 25 UNITS: 100 INJECTION, SOLUTION SUBCUTANEOUS at 13:02

## 2023-10-28 RX ADMIN — ZIPRASIDONE MESYLATE 20 MG: 20 INJECTION, POWDER, LYOPHILIZED, FOR SOLUTION INTRAMUSCULAR at 17:35

## 2023-10-28 RX ADMIN — QUETIAPINE FUMARATE 500 MG: 400 TABLET ORAL at 20:19

## 2023-10-28 ASSESSMENT — ACTIVITIES OF DAILY LIVING (ADL)
ADLS_ACUITY_SCORE: 37

## 2023-10-28 NOTE — ED PROVIDER NOTES
" ED Observation Progress Note  [facility]  Note Date: 10/28/2023    Ayana Prasad MRN: [id number]   Age: 33 year old YOB: 1990     Interval History   The patient is a 33-year-old with a past medical history of bipolar disorder, borderline personality disorder, PTSD who presented to the emergency department from a group home with paranoia, thinking that people were after theim with guns.  Has been compliant with medications.  Some concerns for substance abuse as well.  No acute events reported overnight    Physical Exam   BP (!) 148/90   Pulse 92   Temp 97.8  F (36.6  C) (Oral)   Resp 16   LMP  (LMP Unknown)   SpO2 95%   Physical Exam  General: NAD. Appears stated age.   Cardio: extremities well perfused  Resp: Normal work of breathing, normal respiratory rate  Neuro: alert, PEREZ  MSK: no deformities. Grossly normal ROM.  Integumentary/Skin: no rash visualized, normal color  Psych: Calm and cooperative     Results       Assessments & Plan (with Medical Decision Making)   Ayana Prasad is a 33 year old adult admitted to ED Observation status with paranoia.  Awaiting mental health reassessment    On this date, the patient did not require medications for agitation, and did not require restraints/seclusion for patient and/or provider safety.     Notable events and plan updates today: Pt was reevaluated by DEC. The patient wants to go home, but patient seems very agitated and endorses hallucinations. Not safe to discharge at this time, not at mental health baseline. Pt states they are \"not sure\" if they are suicidal. Recommend inpatient mental health admission    The patient's condition is such that further monitoring for psychiatric stabilization and/or coordination of a safe disposition is still indicated. The observation plan includes serial assessments of psychiatric condition, potential administration of medications if indicated, further disposition pending the " patient's psychiatric course during the monitoring period.     --  Nohemi Ayala MD  [facility] EMERGENCY DEPARTMENT  10/28/2023        Nohemi Ayala MD  10/28/23 0927       Nohemi Ayala MD  10/28/23 1321

## 2023-10-28 NOTE — PROGRESS NOTES
"Triage & Transition Services, Extended Care     Therapy Progress Note    Patient: Prakash goes by \"Prakash,\" uses he/him pronouns  Date of Service: October 28, 2023  Site of Service: [facility] EMERGENCY DEPARTMENT                             ED17  Patient was seen in-person.     Presenting problem:   Prakash is followed related to Boarding Status. Please see initial DEC/LM Crisis Assessment completed by Angi Escobar on 10.26.23 for complete assessment information. Notable concerns include Significant behavioral change, Paranoia, Substance use .     Individuals Present: Prakash & Amy Jack    Session start: 1:07 pm  Session end: 1:23 pm  Session duration in minutes: 16  Session number: 1  Anticipated number of sessions or this episode of care: 1-3  CPT utilized: 40160 - Psychotherapy (with patient) - 30 (16-37*) min    Current Presentation:   Pt was sitting on a chair and was actively anxious, this was evidenced by pt's legs moving up and down. Pt reported movement and listening to music is helpful. Pt reported the ED is over stimulating and they are feeling increased symptoms in the ED. Pt reported they see three figures dressed in black that often follow them where ever they go. Pt reported he was not seeing them while meeting with writer. Pt indicated they are experiencing current racing thoughts and described it as having 5 TV shows going on at one time. Pt advocated to leave however pt is continuing to endorse AH, VH. Pt was pleasant and engaged in session.      Mental Status Exam:   Appearance: awake, alert and adequately groomed  Attitude: cooperative  Eye Contact:  looking at the floor  Mood: anxious  Affect: intensity is heightened  Speech: clear, coherent  Psychomotor Behavior: physical agitation  Thought Process:  disorganized  Associations: loosening of associations present  Thought Content:  pt reported being \"unsure\"  Insight: fair  Judgement: fair  Oriented to: time, person, and " place  Attention Span and Concentration: fair  Recent and Remote Memory: fair    Diagnosis:   Schizoaffective disorder, bipolar type (H) F25.0       Therapeutic Intervention(s):   Provided active listening, unconditional positive regard, and validation.     Treatment Objective(s) Addressed:   The focus of this session was on rapport building and assessing safety .     Progress Towards Goals:   Patient reports  some lessoning of  symptoms. Patient is making progress towards treatment goals as evidenced by requesting PRN's when needed .     Case Management:   Writer reached out to Domenica and left a message for a returned phone call to obtain clear understanding of pt baseline.      General Recommendations:   Continue to monitor for harm. Consider: Provide the pt with options to provide a sense of control. Try to tell the pt what they can do instead of what they can't do, Be firm but gentle when redirecting, Listen in a neutral, non-judgmental way. Offer reassurance, and Be mindful of your nonverbal cues (body language, facial expressions)    Plan:   Observation: Pt lives in a group home with supports to manage pt care, pt went to park and used Cannabis this has been determined through UA given to Pt at ED. Pt would benefit from a consult from psychiatry. Writer has placed a consult request in. Pt will remain in observation to determine if baseline has been reached. Pt is under commitment at this time and remains voluntary in Ed.      Plan for Care reviewed with Assigned Medical Provider? Yes. Provider, Dr. Ayala, response: agreeable     Amy Jack   Licensed Mental Health Professional (LMHP), [facility]  [phone]

## 2023-10-28 NOTE — ED NOTES
Pt got out of the room and started walking in the hallway towards the exit and ambulance bay. Pt ws pacing in that area and was anxious. Security, writer and sitter walked with pt and called pt name asking if he needed anything and how he couldn't walk in that direction. Pt wasn't responding and seemed to have headphones on. Pt then heard staff redirect pt and ask if he could go back to his room. Pt cooperated and walked back to room. Writer asked if pt could take the medication that were prescribed to him to help with his anxiety and pt still refused and said '' I don't want to take meds.''

## 2023-10-28 NOTE — MEDICATION SCRIBE - ADMISSION MEDICATION HISTORY
Medication Scribe Admission Medication History    Admission medication history is complete. The information provided in this note is only as accurate as the sources available at the time of the update.    Information Source(s): Patient and CareEverywhere/SureScripts via in-person    Pertinent Information: Patient reported he stopped taking amlodipine, Seroquel, rosuvastatin, and monodox on his own for the past month.    Changes made to PTA medication list:  Added: None  Deleted: None  Changed: None    Medication Affordability:       Allergies reviewed with patient and updates made in EHR: yes    Medication History Completed By: Radha Wei 10/27/2023 7:46 PM    PTA Med List   Medication Sig Last Dose    empagliflozin (JARDIANCE) 10 MG TABS tablet Take 1 tablet (10 mg) by mouth daily 10/26/2023    gabapentin (NEURONTIN) 600 MG tablet Take 2 tablets (1,200 mg) by mouth 3 times daily 10/26/2023    insulin glargine (LANTUS PEN) 100 UNIT/ML pen Inject 25 Units Subcutaneous every morning (before breakfast) 10/26/2023    insulin pen needle (BD SAMANTHA U/F) 32G X 4 MM miscellaneous Use 1 pen needles daily or as directed. 10/26/2023    ondansetron (ZOFRAN ODT) 4 MG ODT tab Take 1 tablet (4 mg) by mouth daily as needed for nausea 10/26/2023    testosterone (ANDROGEL 1.62 % PUMP) 20.25 MG/ACT gel Place 2 Pump onto the skin daily Apply from dispenser to clean, dry, intact skin of the upper arms and shoulders. 10/26/2023

## 2023-10-28 NOTE — ED NOTES
"Patient pacing in room, RN asked patient if they would be willing to take PRN medications to calm. Patient initially refused but then stated \"only if its in a shot.\" MD notified  "

## 2023-10-29 ENCOUNTER — NURSE TRIAGE (OUTPATIENT)
Dept: NURSING | Facility: CLINIC | Age: 33
End: 2023-10-29
Payer: MEDICARE

## 2023-10-29 ENCOUNTER — HOSPITAL ENCOUNTER (EMERGENCY)
Facility: CLINIC | Age: 33
Discharge: HOME | End: 2023-10-30
Attending: EMERGENCY MEDICINE | Admitting: EMERGENCY MEDICINE
Payer: MEDICARE

## 2023-10-29 VITALS
TEMPERATURE: 98.3 F | DIASTOLIC BLOOD PRESSURE: 91 MMHG | HEART RATE: 118 BPM | SYSTOLIC BLOOD PRESSURE: 137 MMHG | RESPIRATION RATE: 18 BRPM | OXYGEN SATURATION: 95 %

## 2023-10-29 DIAGNOSIS — F41.9 ANXIETY: ICD-10-CM

## 2023-10-29 DIAGNOSIS — Z78.9 LIVES IN GROUP HOME: ICD-10-CM

## 2023-10-29 LAB
GLUCOSE BLDC GLUCOMTR-MCNC: 186 MG/DL (ref 70–99)
GLUCOSE BLDC GLUCOMTR-MCNC: 224 MG/DL (ref 70–99)

## 2023-10-29 PROCEDURE — 99285 EMERGENCY DEPT VISIT HI MDM: CPT | Performed by: EMERGENCY MEDICINE

## 2023-10-29 PROCEDURE — 80307 DRUG TEST PRSMV CHEM ANLYZR: CPT | Performed by: EMERGENCY MEDICINE

## 2023-10-29 PROCEDURE — 250N000013 HC RX MED GY IP 250 OP 250 PS 637: Performed by: EMERGENCY MEDICINE

## 2023-10-29 PROCEDURE — 82962 GLUCOSE BLOOD TEST: CPT

## 2023-10-29 PROCEDURE — 99284 EMERGENCY DEPT VISIT MOD MDM: CPT | Performed by: EMERGENCY MEDICINE

## 2023-10-29 RX ORDER — OLANZAPINE 5 MG/1
5 TABLET, ORALLY DISINTEGRATING ORAL
Status: COMPLETED | OUTPATIENT
Start: 2023-10-29 | End: 2023-10-29

## 2023-10-29 RX ORDER — PROPRANOLOL HYDROCHLORIDE 10 MG/1
10 TABLET ORAL ONCE
Status: COMPLETED | OUTPATIENT
Start: 2023-10-29 | End: 2023-10-29

## 2023-10-29 RX ORDER — OLANZAPINE 5 MG/1
5-10 TABLET, ORALLY DISINTEGRATING ORAL ONCE
Status: COMPLETED | OUTPATIENT
Start: 2023-10-29 | End: 2023-10-29

## 2023-10-29 RX ORDER — TESTOSTERONE 1.62 MG/G
2 GEL TRANSDERMAL DAILY
Status: DISCONTINUED | OUTPATIENT
Start: 2023-10-29 | End: 2023-10-29 | Stop reason: HOSPADM

## 2023-10-29 RX ORDER — QUETIAPINE FUMARATE 50 MG/1
50 TABLET, FILM COATED ORAL DAILY
Status: DISCONTINUED | OUTPATIENT
Start: 2023-10-29 | End: 2023-10-29 | Stop reason: HOSPADM

## 2023-10-29 RX ORDER — ROSUVASTATIN CALCIUM 20 MG/1
40 TABLET, COATED ORAL DAILY
Status: DISCONTINUED | OUTPATIENT
Start: 2023-10-29 | End: 2023-10-29 | Stop reason: HOSPADM

## 2023-10-29 RX ORDER — GABAPENTIN 600 MG/1
1200 TABLET ORAL 3 TIMES DAILY
Status: DISCONTINUED | OUTPATIENT
Start: 2023-10-29 | End: 2023-10-29 | Stop reason: HOSPADM

## 2023-10-29 RX ORDER — PANTOPRAZOLE SODIUM 40 MG/1
40 TABLET, DELAYED RELEASE ORAL
Status: DISCONTINUED | OUTPATIENT
Start: 2023-10-29 | End: 2023-10-29 | Stop reason: HOSPADM

## 2023-10-29 RX ADMIN — TESTOSTERONE 40.5 MG: 20.25 GEL TOPICAL at 11:01

## 2023-10-29 RX ADMIN — QUETIAPINE FUMARATE 50 MG: 50 TABLET ORAL at 08:41

## 2023-10-29 RX ADMIN — ROSUVASTATIN 40 MG: 20 TABLET, FILM COATED ORAL at 08:42

## 2023-10-29 RX ADMIN — INSULIN GLARGINE 25 UNITS: 100 INJECTION, SOLUTION SUBCUTANEOUS at 08:45

## 2023-10-29 RX ADMIN — PROPRANOLOL HYDROCHLORIDE 10 MG: 10 TABLET ORAL at 11:37

## 2023-10-29 RX ADMIN — OLANZAPINE 5 MG: 5 TABLET, ORALLY DISINTEGRATING ORAL at 21:16

## 2023-10-29 RX ADMIN — EMPAGLIFLOZIN 10 MG: 10 TABLET, FILM COATED ORAL at 08:42

## 2023-10-29 RX ADMIN — AMLODIPINE BESYLATE 10 MG: 5 TABLET ORAL at 08:41

## 2023-10-29 RX ADMIN — NICOTINE POLACRILEX 2 MG: 2 GUM, CHEWING BUCCAL at 11:29

## 2023-10-29 RX ADMIN — NICOTINE POLACRILEX 2 MG: 2 GUM, CHEWING BUCCAL at 08:52

## 2023-10-29 RX ADMIN — NICOTINE POLACRILEX 2 MG: 2 GUM, CHEWING BUCCAL at 07:39

## 2023-10-29 RX ADMIN — PANTOPRAZOLE SODIUM 40 MG: 40 TABLET, DELAYED RELEASE ORAL at 08:42

## 2023-10-29 RX ADMIN — OLANZAPINE 5 MG: 5 TABLET, ORALLY DISINTEGRATING ORAL at 22:58

## 2023-10-29 RX ADMIN — GABAPENTIN 1200 MG: 600 TABLET, FILM COATED ORAL at 08:41

## 2023-10-29 ASSESSMENT — COLUMBIA-SUICIDE SEVERITY RATING SCALE - C-SSRS
TOTAL  NUMBER OF INTERRUPTED ATTEMPTS SINCE LAST CONTACT: NO
6. HAVE YOU EVER DONE ANYTHING, STARTED TO DO ANYTHING, OR PREPARED TO DO ANYTHING TO END YOUR LIFE?: NO
TOTAL  NUMBER OF ABORTED OR SELF INTERRUPTED ATTEMPTS SINCE LAST CONTACT: NO
2. HAVE YOU ACTUALLY HAD ANY THOUGHTS OF KILLING YOURSELF?: NO
ATTEMPT SINCE LAST CONTACT: NO
SUICIDE, SINCE LAST CONTACT: NO
1. SINCE LAST CONTACT, HAVE YOU WISHED YOU WERE DEAD OR WISHED YOU COULD GO TO SLEEP AND NOT WAKE UP?: NO

## 2023-10-29 ASSESSMENT — ACTIVITIES OF DAILY LIVING (ADL)
ADLS_ACUITY_SCORE: 37

## 2023-10-29 NOTE — ED NOTES
[facility] ED Mental Health Handoff Note:       Brief HPI:  This is a 33 year old adult signed out to me by Dr. Villareal.  See initial ED Provider note for full details of the presentation.  The patient is a 33-year-old with a history of bipolar disorder, borderline personality disorder, PTSD who presented to the emergency department with paranoia and thinking that there were people after them with guns.  There was some concern for substance abuse as well and the patient was observed for a period of time in the emergency department to see if they would clear.  Unfortunately, the patient continued to have significant agitation and paranoid thought content, requiring multiple doses of medications to control symptoms.  Plan for inpatient mental health admission.    Home meds reviewed and ordered/administered: No    Medically stable for inpatient mental health admission: Yes.    Evaluated by mental health: Yes. The recommendation is for inpatient mental health treatment. Bed search in process    Safety concerns: At the time I received sign out, there were no safety concerns.    Hold Status:  Active Orders   Legal    Health Officer Authority to Detain (FAVIO)     Frequency: Effective Now     Start Date/Time: 10/26/23 1852      Number of Occurrences: Until Specified     Order Comments: Paranoid              Exam:   No data found.         ED Course:    Medications   QUEtiapine (SEROquel) tablet 500 mg ( Oral Canceled Entry 10/28/23 2200)   insulin glargine (LANTUS PEN) injection 25 Units (25 Units Subcutaneous $Given 10/28/23 1302)   amLODIPine (NORVASC) tablet 10 mg (10 mg Oral Not Given 10/28/23 1258)   OLANZapine (zyPREXA) injection 10 mg (10 mg Intramuscular $Given 10/28/23 1537)   nicotine (NICORETTE) gum 2-4 mg (2 mg Buccal $Given 10/29/23 2135)   OLANZapine (zyPREXA) injection 10 mg (10 mg Intramuscular $Given 10/26/23 1900)   LORazepam (ATIVAN) injection 1 mg (1 mg Intramuscular $Given 10/26/23 1910)    OLANZapine (zyPREXA) injection 10 mg (10 mg Intramuscular $Given 10/26/23 2110)   nicotine polacrilex (NICORETTE) gum 4 mg (4 mg Buccal $Given 10/26/23 2116)   OLANZapine zydis (zyPREXA) ODT tab 10 mg (10 mg Sublingual $Given 10/27/23 0759)   nicotine polacrilex (NICORETTE) gum 4 mg (4 mg Buccal $Given 10/27/23 0819)   LORazepam (ATIVAN) tablet 2 mg (2 mg Oral $Given 10/27/23 0910)   OLANZapine zydis (zyPREXA) ODT tab 10 mg (10 mg Oral $Given 10/27/23 1252)   LORazepam (ATIVAN) tablet 2 mg (2 mg Oral $Given 10/27/23 1433)   diphenhydrAMINE (BENADRYL) capsule 50 mg (50 mg Oral $Given 10/27/23 1433)   LORazepam (ATIVAN) tablet 2 mg (2 mg Oral $Given 10/27/23 1938)   diphenhydrAMINE (BENADRYL) capsule 25 mg (25 mg Oral $Given 10/27/23 1938)   nicotine (NICORETTE) gum 2 mg (2 mg Buccal $Given 10/28/23 1246)   hydrOXYzine (ATARAX) tablet 25 mg (25 mg Oral Not Given 10/28/23 1321)   OLANZapine zydis (zyPREXA) ODT tab 5 mg (5 mg Oral Not Given 10/28/23 1333)   nicotine polacrilex (NICORETTE) gum 4 mg (4 mg Buccal $Given 10/28/23 1530)   hydrOXYzine (ATARAX) tablet 25 mg (25 mg Oral Not Given 10/28/23 1759)   ziprasidone (GEODON) injection 20 mg (20 mg Intramuscular $Given 10/28/23 1735)            There were significant events during my shift.    Patient's home medications were ordered    0925 Pt was d/w DEC , pt has been re-evaluated and is now calm, cooperative, no longer agitated. Does still endorse anxiety. Pt does want to go home. Denies SI or HI. They will d/w group home, but likely plan for discharge.     Patient to be signed out to oncoming provider      Impression:    ICD-10-CM    1. Psychosis, unspecified psychosis type (H)  F29           Plan:    Discharged.      RESULTS:   No results found for this visit on 10/26/23 (from the past 24 hour(s)).          MD Lucy Orr Michelle C, MD  10/29/23 0806       Nohemi Ayala MD  10/29/23 0914

## 2023-10-29 NOTE — ED NOTES
ED Observation Discharge Summary  [facility]  Discharge Date: 10/29/2023    Ayana Prasad MRN: [id number]   Age: 33 year old YOB: 1990     Brief HPI & Initial ED Course     Chief Complaint   Patient presents with    Paranoid     Patient presents via car with group home staff. Patient is paranoid that people are after them with guns. Patient has a history of schizoaffective. Group home reports patient is taking medications. Group Staff reports patient going to the park and coming home with paranoid thoughts. Group home staff believe patient may be under the influence of something.     HPI  The patient is a 33-year-old with a history of bipolar disorder, borderline personality disorder, PTSD who presented to the emergency department with paranoia and thinking that there were people after them with guns.  There was some concern for substance abuse as well and the patient was observed for a period of time in the emergency department to see if they would clear.  Unfortunately, the patient continued to have significant agitation and paranoid thought content, requiring multiple doses of medications to control symptoms.  Plan was for inpatient mental health admission, but patient was reevaluated today, he was noted to be calm and cooperative, no longer agitated.  Does endorse some anxiety, but patient does want to be discharged home.  No suicidal or homicidal ideation.     The patient was evaluated in the emergency department by a physician and DEC behavioral health . The DEC  recommended further observation in the emergency department. See separate DEC note from this encounter for details on the assessment. The patient's psychiatric state was such that he would benefit from ongoing monitoring. Observation care was initiated with the plan including serial assessments of psychiatric condition, potential administration of medications if indicated, and further  disposition pending the patient's psychiatric course during the monitoring period.     See ED Observation H&P for further details on the patient's presenting history and initial evaluation.     Physical Exam   BP:  (restless)  Pulse: 115  Temp: 98.7  F (37.1  C)  Resp: 22  SpO2: 96 %    Physical Exam  General: NAD. Appears stated age.   HENT: NCAT  Eyes: No conjunctival injection, normal sclerae   Cardio: extremities well perfused  Resp: Normal work of breathing, normal respiratory rate  Neuro: alert, moving all extremities  MSK: no deformities.   Integumentary/Skin: no rash visualized, normal color  Psych: Calm and cooperative    Results      Procedures                    Labs Ordered and Resulted from Time of ED Arrival to Time of ED Departure   COMPREHENSIVE METABOLIC PANEL - Abnormal       Result Value    Sodium 139      Potassium 3.7      Carbon Dioxide (CO2) 20 (*)     Anion Gap 15      Urea Nitrogen 25.7 (*)     Creatinine 0.94      GFR Estimate 82      Calcium 9.7      Chloride 104      Glucose 144 (*)     Alkaline Phosphatase 83      AST 53 (*)     ALT 50      Protein Total 7.6      Albumin 4.5      Bilirubin Total 0.2     CBC WITH PLATELETS AND DIFFERENTIAL - Abnormal    WBC Count 13.7 (*)     RBC Count 4.99      Hemoglobin 14.1      Hematocrit 42.1      MCV 84      MCH 28.3      MCHC 33.5      RDW 15.6 (*)     Platelet Count 302      % Neutrophils 66      % Lymphocytes 28      % Monocytes 5      % Eosinophils 0      % Basophils 0      % Immature Granulocytes 1      NRBCs per 100 WBC 0      Absolute Neutrophils 8.9 (*)     Absolute Lymphocytes 3.8      Absolute Monocytes 0.7      Absolute Eosinophils 0.1      Absolute Basophils 0.1      Absolute Immature Granulocytes 0.1      Absolute NRBCs 0.0     LACTIC ACID WHOLE BLOOD - Abnormal    Lactic Acid 2.4 (*)    URINE DRUG SCREEN PANEL - Abnormal    Amphetamines Urine Screen Negative      Barbituates Urine Screen Negative      Benzodiazepine Urine Screen  Negative      Cannabinoids Urine Screen Positive (*)     Cocaine Urine Screen Negative      Fentanyl Qual Urine Screen Negative      Opiates Urine Screen Negative      PCP Urine Screen Negative     GLUCOSE BY METER - Abnormal    GLUCOSE BY METER POCT 135 (*)    GLUCOSE BY METER - Abnormal    GLUCOSE BY METER POCT 172 (*)    GLUCOSE BY METER - Abnormal    GLUCOSE BY METER POCT 156 (*)    GLUCOSE BY METER - Abnormal    GLUCOSE BY METER POCT 224 (*)    ETHYL ALCOHOL LEVEL - Normal    Alcohol ethyl <0.01     LACTIC ACID WHOLE BLOOD - Normal    Lactic Acid 1.9              Observation Course   The patient was found to have a psychiatric condition that would benefit from an observation stay in the emergency department for further psychiatric stabilization and/or coordination of a safe disposition. The plan upon observation admission included serial assessments of psychiatric condition, potential administration of medications if indicated, further disposition pending the patient's psychiatric course during the monitoring period.     Serial assessments of the patient's psychiatric condition were performed. Nursing notes were reviewed. During the observation period, the patient did not require medications for agitation, and did not require restraints/seclusion for patient and/or provider safety.     After the period in observation care, the patient's circumstances and mental state were safe for outpatient management. After counseling on the diagnosis, work-up, and treatment plan, the patient was discharged to group home. Close follow-up with a psychiatrist and/or therapist was recommended (patient does have an appointment scheduled on Tuesday with psychiatry) and community psychiatric resources were provided. Patient is to return to the ED if any urgent or potentially life-threatening concerns.      Discharge Diagnoses:   Final diagnoses:   Psychosis, unspecified psychosis type (H)       --  Nohemi Ayala MD  [facility]  [facility] EMERGENCY DEPARTMENT  10/29/2023      Nohemi Ayala MD  10/29/23 8032

## 2023-10-29 NOTE — TELEPHONE ENCOUNTER
"Nurse Triage SBAR    Situation: Anxious     Background: Patient calling. Patient was in the hospital today. Patient stated for the last 5 [facility].     Assessment: Patient is feeling anxious. Feeling \"wired\". Patient sounds anxious on the phone.     Protocol Recommended Disposition: Emergency Department (Or PCP Triage)    Recommendation: According to the protocol, Patient should go to the ED now (Or PCP Triage). Advised Patient that the patient needs to wait for a call-back after nurse speaks with the on-call Provider. Care advice given. Patient verbalizes understanding and agrees with plan of care. Reviewed concerning symptoms and when to call back.     Paging on call Dr Hodan Dean. Per MD - Patient needs to go into the ED.       Provider Recommendation Follow Up:   Reached patient/caregiver. Informed of provider's recommendations. Patient verbalized understanding and agrees with the plan.     Kristin Roberts RN Nursing Advisor 10/29/2023 6:24 PM     Reason for Disposition   [1] SEVERE anxiety (e.g., extremely anxious with intense emotional symptoms such as feeling of unreality, urge to flee, unable to calm down; unable to cope or function) AND [2] not better after 10 minutes of reassurance and Care Advice    Additional Information   Negative: SEVERE difficulty breathing (e.g., struggling for each breath, speaks in single words)   Negative: Bluish (or gray) lips or face now   Negative: Difficult to awaken or acting confused (e.g., disoriented, slurred speech)   Negative: Violent behavior, or threatening to physically hurt or kill someone   Negative: Sounds like a life-threatening emergency to the triager   Negative: Chest pain   Negative: Palpitations, skipped heartbeat, or rapid heartbeat is main symptom   Negative: Cough is main symptom   Negative: Suicide thoughts, threats, attempts, or questions   Negative: Depression is main problem or symptom (e.g., feelings of sadness or hopelessness)   " Negative: [1] Difficulty breathing AND [2] persists > 10 minutes AND [3] not relieved by reassurance provided by triager   Negative: [1] Lightheadedness or dizziness AND [2] persists > 10 minutes AND [3] not relieved by reassurance provided by triager    Protocols used: Anxiety and Panic Attack-A-AH

## 2023-10-29 NOTE — PROGRESS NOTES
"Triage & Transition Services, Extended Care     Therapy Progress Note    Patient: Prakash goes by \"Prakash,\" uses he/him pronouns  Date of Service: October 29, 2023  Site of Service:  ED  Patient was seen in-person.     Presenting problem:   Prakash is followed related to  Observation . Please see initial [facility] Crisis Assessment completed by Angi Escobar on 10/26/23 for complete assessment information. Notable concerns include increased paranoia, lack of sleep, increased anxiety.     Individuals Present: Praksah & JULISA Ochoa    Session start: 9:01  Session end: 9:23am  Session duration in minutes: 22  Session number: 1  Anticipated number of sessions or this episode of care: 2-3  CPT utilized: 59509 - Psychotherapy (with patient) - 30 (16-37*) min    Current Presentation:   Writer met with pt to re-assess.  Writer introduced self to pt and pt greeted writer appropriately.  Writer observed pt tapping feet anxiously.  Pt reports he feels better overall.  He denies current feelings of paranoia.  He reports he has slept while in the hospital. Pt reports decreased anxiety, although, appears baseline for pt is some anxiety.  Pt reports he discontinued his anxiety medications 2-3 weeks ago, which corresponds with pt's reported time frame of when anxiety and paranoia initially started. Writer assessed pt  symptoms related to schizoaffective disorder (pt report is he has not been sleeping).  Pt reported that he did not sleep for approximately 4 days and was out in the community, mostly walking. Pt denies engaging in risky behaviors during this time, denies hallucinations during this time. He reports he has been sleeping since he got to the hospital.  Pt reports he discontinued medications because he felt they were not working.  Pt reports he wants to go home and feels he is safe to go.  Pt denies SI/HI or SIB.  Writer worked with pt on safety planning.  Pt reports he has a psychiatrist, a CM and an S-cubism worker.  " He reports his CM is working to get him a therapist and potentially a new psychiatrist.  Pt declined assistance with getting a psychiatrist, stating he prefers to work with his CM on this.  Pt was able to identify warning signs of increased paranoia and anxiety and coping strategies he finds helpful including TIPPS skills and reaching to positive supports in his life.  Writer explored ways pt can use mindfulness when walking to help regulate increased anxiety.       Mental Status Exam:   Appearance: awake, alert  Attitude: cooperative  Eye Contact: good  Mood: anxious  Affect: mood congruent  Speech: clear, coherent  Psychomotor Behavior: no evidence of tardive dyskinesia, dystonia, or tics and fidgeting  Thought Process:  logical  Associations: no loose associations  Thought Content: no evidence of suicidal ideation or homicidal ideation and no evidence of psychotic thought  Insight: fair  Judgement: fair  Oriented to: time, person, and place  Attention Span and Concentration: intact  Recent and Remote Memory: intact    [place] Suicide Severity Rating Scale Since Last Contact: 10/29/2023  Suicidal Ideation (Since Last Contact)  1. Wish to be Dead (Since Last Contact): No  2. Non-Specific Active Suicidal Thoughts (Since Last Contact): No  Suicidal Behavior (Since Last Contact)  Actual Attempt (Since Last Contact): No  Has subject engaged in non-suicidal self-injurious behavior? (Since Last Contact): No  Interrupted Attempts (Since Last Contact): No  Aborted or Self-Interrupted Attempt (Since Last Contact): No  Preparatory Acts or Behavior (Since Last Contact): No  Suicide (Since Last Contact): No     C-SSRS Risk (Since Last Contact)  Calculated C-SSRS Risk Score (Since Last Contact): No Risk Indicated        Diagnosis:   F25.0 Schizoaffective disorder, bipolar type  F41.9 Anxiety Disorder    Therapeutic Intervention(s):   Provided active listening, unconditional positive regard, and validation. Engaged in safety  planning.  Engaged in guided discovery, explored patient's perspectives and helped expand them through socratic dialogue. Coached on coping techniques/relaxation skills to help improve distress tolerance and managing intense emotions. Reviewed healthy living that supports positive mental health, including looking at sleep hygiene, regular movement, nutrition, and regular socialization. Worked on relapse prevention planning (review of stressors, early warning signs, written plan to respond to signs, and rehearse plan). Introduced and discussed radical acceptance. Discussed TIP (body temperature, intense exercise, PMR). Discussed and practiced mindfulness.     Treatment Objective(s) Addressed:   The focus of this session was on rapport building, identifying and practicing coping strategies, safety planning, identifying an appropriate aftercare plan, building distress tolerance, assessing safety, and identifying additional supports.     Progress Towards Goals:   Patient reports improving symptoms. Patient is making progress towards treatment goals as evidenced by report of absence of paranoia, somewhat decreased anxiety, denies SI/HI/SIB, denies A/V hallucinations.     Case Management:   Writer contacted GH Manager, Domenica at  [phone].  Domenica reports pts can return and requests copy of discharge summary be faxed to [phone]    General Recommendations:   Continue to monitor for harm. Consider: Use a positive, direct and calm approach. Pt's tend to match the energy/mood of the staff. Keep focus positive and upbeat, Use clear and concise directions, too many words can be overwhelming, Provide the pt with options to provide a sense of control. Try to tell the pt what they can do instead of what they can't do, Verbally state expectations , Be firm but gentle when redirecting, Listen in a neutral, non-judgmental way. Offer reassurance, and Be mindful of your nonverbal cues (body language, facial expressions)    Plan:    Discharge: Pt is currently recommended for discharge.  Pt reports improvement in symptoms and reports decrease in feelings of paranoia, some decrease in anxiety, and has been able to sleep since admission.  Pt has outpatient supports including a  CM, psychiatrist and [employer] worker.  Pt has an appointment on Tuesday with his psychiatrist and  CM and demonstrates insight into the impact of stopping medications on presenting symptoms.  Pt denies SI/HI and is able to identify strategies for coping and managing symptoms.  Pt reports his CM is also working on re-establishing him with a therapist and he prefers to work with his CM on this.      Plan for Care reviewed with Assigned Medical Provider? Yes. Provider, Dr. Ayala, response: acknowledged and in agreement.       Mei Fleming, [facility]   Licensed Mental Health Professional (LMHP), [facility]  [phone]       Aftercare Plan  Follow up with psychiatry and CM on Tuesday 10/31/23.     to continue referrals to therapy.          If I am feeling unsafe or I am in a crisis, I will:   Contact my established care providers   Call the National Suicide Prevention Lifeline: 556.306.5472   Go to the nearest emergency room   Call 326      Warning signs that I or other people might notice when a crisis is developing for me:     Increase in feelings of paranoia.    Increase in feelings of anxiety  Not taking care of self  Not sleeping   I am having increasing suicidal thoughts that turn to plans with intent or means  I am having additional urges to self-harm    My emotions are of hopelessness; feeling like there's no way out.  Rage or anger.  Engaging in risky activities without thinking  Withdrawing from family/friends  Dramatic mood swings  Drastic personality changes   Use of alcohol or drugs  Postings on social media  Neglect of personal hygiene or cares      Things I am able to do on my own to cope or help me feel better:    Spending quality time with  loved ones  Staying hydrated  Eating balanced meals  Going for a walk every day  Take care of daily responsibilities/needs  Focus on positive self-talk vs negative self-talk     Things that I am able to do with others to cope or help me better:   Exercise  Music  Deep breathing  Meditations  Journal  Self-regulate  Self check-in  Ask for help     Things I can use or do for distraction:   Reach out to/spend time with family, friends  Shower  Exercise  Chores or do a project  Listen to music  Watch movie/TV  Listening to music  Journaling  Reading a book  Meditating  Call a friend     Changes I can make to support my mental health and wellness:    -I will abstain from all mood altering chemicals not currently prescribed to me   -I will attend scheduled mental health therapy and psychiatric appointments and follow all   recommendations  -I will commit to 30 minutes of self care daily - this can be as simple as taking a shower, going for a   walk, cooking a meal, read, writing, etc  -I will practice square breathing when I begin to feel anxious - in breath through the nose for the count   of 4 and the first line on the square. Out breath through the mouth for the count of 4 for the second line   of the square. Repeat to complete the square. Repeat the square as many times as needed.  - I will use distraction skills of: going for walks, watching TV, spending time outside, calling a friend or   family member  -Use community resources, including hotline numbers, [facility], [facility] crisis and support meetings  -Maintain a daily schedule/routine  -Practice deep breathing skills  -Download a meditation hiren and spend 15-20 minutes per day mediating/relaxing. Some apps to   download include: Calm, Headspace and Insight Timer. All 3 of these apps have free version     Reduce Extreme Emotion  QUICKLY:  Changing Your Body Chemistry      T:  Change your body Temperature to change your autonomic nervous system   Use Ice Water to calm yourself  down FAST   Put your face in a bowl of ice water (this is the best way; have the person keep his/her face in ice water for 30-45 seconds - initial research is showing that the longer s/he can hold her/his face in the water, the better the response), or   Splash ice water on your face, or hold an ice pack on your face      I:  Intensely exercise to calm down a body revved up by emotion   Examples: running, walking fast, jumping, playing basketball, weight lifting, swimming, calisthenics, etc.   Engage in exercises that DO NOT include violent behaviors. Exercises that utilize violent behaviors tend to function as  behavioral rehearsal,  and rather than calming the person down, may actually  rev  the person up more, increasing the likelihood of violence, and lessening the likelihood that they will  burn off  energy     P:  Progressively relax your muscles   Starting with your hands, moving to your forearms, upper arms, shoulders, neck, forehead, eyes, cheeks and lips, tongue and teeth, chest, upper back, stomach, buttocks, thighs, calves, ankles, feet   Tense (10 seconds,   of the way), then relax each muscle (all the way)   Notice the tension   Notice the difference when relaxed (by tensing first, and then relaxing, you are able to get a more thorough relaxation than by simply relaxing)      P: Paced breathing to relax   The standard technique is to begin with counting the number of steps one takes for a typical inhale, then counting the steps one takes for a typical exhale, and then lengthening the amount of steps for the exhalation by one or two steps.  OR  Repeat this pattern for 1-2 minutes  Inhale for four (4) seconds   Exhale for six (6) to eight (8) seconds   Research demonstrated that one can change one's overall level of anxiety by doing this exercise for even a few minutes per day       People in my life that I can ask for help:   Family  Friends  Providers     Your [facility] has a mental health crisis team you  "can call 24/7:   [facility] Crisis Line Number: [phone]  [facility] Mental Health Crisis: [phone] - Call the crisis line for immediate mental health support, 24 hours a day.   [facility] Crisis Line Number: [phone]  [facility] Crisis Line Number: [phone]  [facility] Crisis Line Number: [phone]   [facility] Crisis Line Number: [phone]  [place]: [phone]  [place]: [phone]  [facility] Crisis Number: [phone]  [facility] Crisis: [phone]  [facility] Mobile Crisis: [phone]     Other things that are important when I'm in crisis:   Ask for help     Additional resources and information:      Mental Health Apps  My3  https://Goodfilms.NextGreatPlace/     VirtualHopeBox  https://Kreix/apps/virtual-hope-box/        Professionals or Agencies I Can Contact During A Crisis:        Crisis Lines  Call or Text 988 - National Suicide and Crisis Lifeline     Crisis Text Line  Text 934168  You will be connected with a trained live crisis counselor to provide support.     The [employer] (LGBTQ Youth Crisis Line)  9.708.817.3522  text START to 706-436     National [place] on Mental Illness (GILMA)  743.143.6832 or 2.909.TYGT.HELPS     National Suicide Prevention Lifeline at 3-513-981-FXGJ (5923)      Throughout  [place]: call **CRISIS (**606820)      Crisis Text Line: is available for free, 24/7 by texting [place] to 681188     Community Resources  Fast Tracker  Linking people to mental health and substance use disorder resources  fasttrackermn.org      [place] Mental Health Warm Line  Peer to peer support  Monday thru Saturday, 12 pm to 10 pm  768.910.9420 or 1.534.222.3090  Text \"Support\" to 06860     National [place] on Mental Illness (www.[place].gilma.org): 363.899.4544 or 812-461-2558     [facility] Phone (free remote counseling): 216.126.6411 Web address:   https://"[employer]in.org/    "   www.psychologytoday.com (filter for insurance, gender preference, etc.)     [facility]   [phone]  Intake appointment will be virtual, following appointments can be in person or virtual.   **IMMEDIATE OPENINGS**     [facility]  [phone]  *offers individual therapy, medication management and Mental Health Case Workers; can self refer     [facility]  [phone]  *Immediate Openings     [facility]  [phone]  *Immediate Openings     [facility]  [phone]  *Immediate Openings     Please follow up with scheduled providers to ensure all necessary paperwork is filled out prior to your   scheduled telehealth appointments.      Coordinators from [facility] will be calling within two business days to ensure   that you have the resources you may need or provide assistance with scheduling (Phone number: [phone].).     Remember: give the referrals 3 sessions prior to calling it quits. Do you trust them? Do you feel   understood? Do you think they can help? Check in with yourself after each session     Please reach out to the [facility]([phone]) regarding further mental health appointment needs for this emergency department visit.     [facility] SCHEDULING:  Today you were seen by a licensed mental health professional through [facility]vices, [facility] ([facility])  for a crisis assessment in the Emergency Department at [facility].  It is recommended that you follow up with your estabished providers (psychiatrist, menta health therapist, and/or primary care doctor - as relevant) as soon as possible. Coordinators from [facility] will be calling you in the next 24-48 hours to ensure that you have the resources you need.  You can so contact [facility] coordinators directly at [phone].     [facility] maintains an extensive network of licensed behavioral health providers to  connect patients with the services they need.  We do not charge providers a fee to participate in our referral network.  We match patients with providers based on a patient s specific needs, insurance coverage, and location.  Our first effort will be to refer you to a provider within your care system, and will utilize providers outside your care system as needed.

## 2023-10-30 ENCOUNTER — TELEPHONE (OUTPATIENT)
Dept: PSYCHIATRY | Facility: CLINIC | Age: 33
End: 2023-10-30
Payer: MEDICARE

## 2023-10-30 VITALS
DIASTOLIC BLOOD PRESSURE: 86 MMHG | RESPIRATION RATE: 17 BRPM | TEMPERATURE: 97.5 F | SYSTOLIC BLOOD PRESSURE: 145 MMHG | HEART RATE: 90 BPM | OXYGEN SATURATION: 96 %

## 2023-10-30 LAB
AMPHETAMINES UR QL SCN: ABNORMAL
BARBITURATES UR QL SCN: ABNORMAL
BENZODIAZ UR QL SCN: ABNORMAL
BZE UR QL SCN: ABNORMAL
CANNABINOIDS UR QL SCN: ABNORMAL
FENTANYL UR QL: ABNORMAL
OPIATES UR QL SCN: ABNORMAL
PCP QUAL URINE (ROCHE): ABNORMAL

## 2023-10-30 PROCEDURE — 250N000013 HC RX MED GY IP 250 OP 250 PS 637: Performed by: EMERGENCY MEDICINE

## 2023-10-30 RX ORDER — LORAZEPAM 1 MG/1
1 TABLET ORAL ONCE
Status: COMPLETED | OUTPATIENT
Start: 2023-10-30 | End: 2023-10-30

## 2023-10-30 RX ORDER — OLANZAPINE 5 MG/1
5 TABLET ORAL 3 TIMES DAILY PRN
Qty: 3 TABLET | Refills: 0 | Status: SHIPPED | OUTPATIENT
Start: 2023-10-30 | End: 2023-10-31

## 2023-10-30 RX ORDER — LANOLIN ALCOHOL/MO/W.PET/CERES
3 CREAM (GRAM) TOPICAL
Status: DISCONTINUED | OUTPATIENT
Start: 2023-10-30 | End: 2023-10-30 | Stop reason: HOSPADM

## 2023-10-30 RX ORDER — OLANZAPINE 5 MG/1
5 TABLET, ORALLY DISINTEGRATING ORAL ONCE
Status: COMPLETED | OUTPATIENT
Start: 2023-10-30 | End: 2023-10-30

## 2023-10-30 RX ADMIN — NICOTINE POLACRILEX 2 MG: 2 GUM, CHEWING BUCCAL at 00:59

## 2023-10-30 RX ADMIN — LORAZEPAM 1 MG: 1 TABLET ORAL at 00:50

## 2023-10-30 RX ADMIN — OLANZAPINE 5 MG: 5 TABLET, ORALLY DISINTEGRATING ORAL at 10:32

## 2023-10-30 RX ADMIN — Medication 3 MG: at 01:20

## 2023-10-30 RX ADMIN — NICOTINE POLACRILEX 2 MG: 2 GUM, CHEWING BUCCAL at 08:28

## 2023-10-30 ASSESSMENT — COLUMBIA-SUICIDE SEVERITY RATING SCALE - C-SSRS
SUICIDE, SINCE LAST CONTACT: NO
TOTAL  NUMBER OF ABORTED OR SELF INTERRUPTED ATTEMPTS SINCE LAST CONTACT: NO
1. SINCE LAST CONTACT, HAVE YOU WISHED YOU WERE DEAD OR WISHED YOU COULD GO TO SLEEP AND NOT WAKE UP?: NO
TOTAL  NUMBER OF INTERRUPTED ATTEMPTS SINCE LAST CONTACT: NO
ATTEMPT SINCE LAST CONTACT: NO
6. HAVE YOU EVER DONE ANYTHING, STARTED TO DO ANYTHING, OR PREPARED TO DO ANYTHING TO END YOUR LIFE?: NO
2. HAVE YOU ACTUALLY HAD ANY THOUGHTS OF KILLING YOURSELF?: NO

## 2023-10-30 ASSESSMENT — ACTIVITIES OF DAILY LIVING (ADL)
ADLS_ACUITY_SCORE: 37

## 2023-10-30 NOTE — ED NOTES
"Pt appeared agitation, restless, and pacing in the HW. Pt walked towards ER entrance, demanding to leave. Security and staff informed pt that they are unable to leave and that we would need to speak to the MD first. Writer spoke with pt, pt agreed to go back to their HW spot while this RN asked the MD. Pt returned to their HW spot and writer informed pt that they can leave, but we are working on contacting their GH. Pt became increasingly agitate from this news stating, \"oh my gosh! They are not going to answer!\" Pt then punched the supply room door and then walked down the HW down towards the ambulance bay doors. Staff and security followed pt and was able to verbally deescalate pt. Pt was redirected to their HW spot, offered pt a room but pt declined. Pt asked for ativan, MD notified and ativan given.   "

## 2023-10-30 NOTE — ED PROVIDER NOTES
[facility] EMERGENCY DEPARTMENT ([facility])    10/29/23      ED PROVIDER NOTE   Hallway A   History     Chief Complaint   Patient presents with    Anxiety     Pacing at home, depersonalization, feels as though life isn't real; owner of group home called 911; pt just discharged from this ED today; upset with himself     The history is provided by the patient and medical records.       Prakash Prasad is a 33 year old adult (female to 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 patient, he/him pronouns) who has a history of borderline personality disorder, anxiety, PTSD, bipolar disorder, currently residing in a group home, who presents to the emergency department for anxiety.  Patient was seen in this emergency department on October 26 and was kept here for a few days for his mental health issues including psychosis.  He was discharged this afternoon after [facility] thought he had improved enough to go back to his group home.  Patient reports that he feels very anxious and depersonalized.  He reports that he has been unable to control his anxiety.  Claims he is on Klonopin for his anxiety which he takes once a day.  I do not see this on his list.  He is on Seroquel and takes this daily.  He is also on gabapentin daily.    Evidently the group home did call 911 due to the patient's anxiety.  Paramedics reports that they did administer Versed 1 mg IM in route due to severe anxiety.    Patient denies any SI/HI, denies any auditory hallucinations.  He does have psychiatry appointment in 2 days time.    Patient denies use of alcohol or drugs.  He does have a previous history of polysubstance abuse.  He does admit to medical marijuana.    Of note, according to the most recent psychiatric clinic visit dated October 11, his psychiatric nurse practitioner discontinued Klonopin.  Seroquel was increased to 550 mg daily.    Patient was seen by DEC  Jeniffer Webber, please see consult note for further details. Here patient  endorses non-command auditory hallucinations, has been hearing voices. Feels very anxious, hyper, scared and confused. When patient is hyper he makes poor decisions like walking 10 miles without shoes on and was concerned he would engage in impulsive behavior. He feels more paranoid than scared. He did sleep through the night last night. He states he didn't mean for any of this to happen, wants to go back to his group home. Patient has been at same group home for the past 3 years. Patient denies any substance use. Patient has been taking his medications.     Past Medical History:   Diagnosis Date    ADHD (attention deficit hyperactivity disorder)     Bipolar 1 disorder     Borderline personality disorder     Cauda equina syndrome     Chronic low back pain     Depression     Diabetes mellitus, type 2 (H) 1/19/2023    GERD (gastroesophageal reflux disease)     h/o TBI (traumatic brain injury)     Hypertension, unspecified type 12/16/2021    Marginal corneal ulcer, left 07/17/2015    Nephrolithiasis     obesity     Polysubstance abuse - methamphetamine, hallucinagen, heroin, marijuana     currently in remission    PONV (postoperative nausea and vomiting)     PTSD (post-traumatic stress disorder)        Past Surgical History:   Procedure Laterality Date    BACK SURGERY  12/24/2016    BACK SURGERY - For Cauda Equina Syndrome  12/24/2016    COLONOSCOPY      COMBINED CYSTOSCOPY, INSERT STENT URETER(S) Left 8/30/2018    Procedure: COMBINED CYSTOSCOPY, INSERT STENT URETER(S);  Cystoscopy With Left Stent Placement;  Surgeon: Kiran Ulrich MD;  Location: [facility] OR    COMBINED CYSTOSCOPY, RETROGRADES, EXCHANGE STENT URETER(S) Left 10/14/2018    Procedure: COMBINED CYSTOSCOPY, RETROGRADES, EXCHANGE STENT URETER(S);  Cystoscopy and removal of left-sided stent.;  Surgeon: Stiven Olivo MD;  Location:  OR    COMBINED CYSTOSCOPY, RETROGRADES, URETEROSCOPY, INSERT STENT  1/6/2014    Procedure: COMBINED  CYSTOSCOPY, RETROGRADES, URETEROSCOPY, INSERT STENT;  Cystyoscopy place left ureteral stent;  Surgeon: Jaun Kimble MD;  Location: [facility] OR    COMBINED CYSTOSCOPY, RETROGRADES, URETEROSCOPY, INSERT STENT Left 10/23/2018    Procedure: Video cystoscopy, left ureteroscopy with stone extraction;  Surgeon: Bull Mast MD;  Location:  OR    CYSTOSCOPY, URETEROSCOPY, COMBINED Right 9/23/2015    Procedure: COMBINED CYSTOSCOPY, URETEROSCOPY;  Surgeon: ROME Jett MD;  Location: [facility] OR    ENT SURGERY      ESOPHAGOSCOPY, GASTROSCOPY, DUODENOSCOPY (EGD), COMBINED  4/8/2013    Procedure: COMBINED ESOPHAGOSCOPY, GASTROSCOPY, DUODENOSCOPY (EGD), BIOPSY SINGLE OR MULTIPLE;  Gastroscopy;  Surgeon: Peter Pickard MD;  Location: [facility] GI    ESOPHAGOSCOPY, GASTROSCOPY, DUODENOSCOPY (EGD), COMBINED Left 10/28/2014    Procedure: COMBINED ESOPHAGOSCOPY, GASTROSCOPY, DUODENOSCOPY (EGD), BIOPSY SINGLE OR MULTIPLE;  Surgeon: Narcisa Ramirez MD;  Location:  OR    ESOPHAGOSCOPY, GASTROSCOPY, DUODENOSCOPY (EGD), COMBINED N/A 12/24/2018    Procedure: COMBINED ESOPHAGOSCOPY, GASTROSCOPY, DUODENOSCOPY (EGD), BIOPSY SINGLE OR MULTIPLE;  Surgeon: Sonu Verduzco MD;  Location: [facility] GI    INJECT EPIDURAL TRANSFORAMINAL LUMBAR / SACRAL EA ADDITIONAL LEVEL Left 3/18/2021    Procedure: Left L5/S1 transforaminal injection for selective L5 nerve root block;  Surgeon: Eliza Pagan MD;  Location: [facility] [place]    LAPAROSCOPIC CHOLECYSTECTOMY  11/20/2014    [facility], Dr. Ramirez    LASER HOLMIUM LITHOTRIPSY URETER(S), INSERT STENT, COMBINED  4/2/2014    Procedure: COMBINED CYSTOSCOPY, URETEROSCOPY, LASER HOLMIUM LITHOTRIPSY URETER(S), INSERT STENT;  Cystoscopy,Left Ureteral Stent Removal,Left Ureteroscopy with Laser Lithotripsy,Left Ureter Stent Placement;  Surgeon: ROME Jett MD;  Location: [facility] OR    Transurethral stone resection  03/11/2011       Family History   Problem Relation Age of Onset    Gastrointestinal Disease  Father         Crohn's Disease    Cancer Father         Liver Cancer    Other Cancer Father         Liver    Obesity Father     Cancer Maternal Grandmother         lympoma    Diabetes Maternal Grandmother     Mental Illness Maternal Grandmother     Other Cancer Maternal Grandmother         Non Hodgkins Lymphoma    Diabetes Maternal Grandfather     Hypertension Maternal Grandfather     Prostate Cancer Maternal Grandfather     Hyperlipidemia Maternal Grandfather     Heart Disease Paternal Grandfather         heart disease    Hypertension Brother     Other Cancer Brother         Esophagecial    Diabetes Brother     Obesity Brother     Hyperlipidemia Mother     Mental Illness Mother     Anxiety Disorder Mother     Anesthesia Reaction Mother         Vomiting/Nausea    Other Cancer Mother     Hypertension Brother     Other Cancer Brother     Other Cancer Paternal Half-Brother     No Known Problems Sister        Social History     Tobacco Use    Smoking status: Every Day     Packs/day: 0.50     Years: 5.00     Additional pack years: 0.00     Total pack years: 2.50     Types: Other, Cigarettes     Start date: 2011     Last attempt to quit: 2022     Years since quittin.1     Passive exposure: Never    Smokeless tobacco: Former     Types: Chew     Quit date: 2019    Tobacco comments:     Just nicotine gum currently. 23   Substance Use Topics    Alcohol use: No         Past Medical History  Past Medical History:   Diagnosis Date    ADHD (attention deficit hyperactivity disorder)     Bipolar 1 disorder     Borderline personality disorder     Cauda equina syndrome     Chronic low back pain     Depression     Diabetes mellitus, type 2 (H) 2023    GERD (gastroesophageal reflux disease)     h/o TBI (traumatic brain injury)     Hypertension, unspecified type 2021    Marginal corneal ulcer, left 2015    Nephrolithiasis     obesity     Polysubstance abuse - methamphetamine, hallucinagen, heroin,  marijuana     currently in remission    PONV (postoperative nausea and vomiting)     PTSD (post-traumatic stress disorder)      Past Surgical History:   Procedure Laterality Date    BACK SURGERY  12/24/2016    BACK SURGERY - For Cauda Equina Syndrome  12/24/2016    COLONOSCOPY      COMBINED CYSTOSCOPY, INSERT STENT URETER(S) Left 8/30/2018    Procedure: COMBINED CYSTOSCOPY, INSERT STENT URETER(S);  Cystoscopy With Left Stent Placement;  Surgeon: Kiran Ulrich MD;  Location: [facility] OR    COMBINED CYSTOSCOPY, RETROGRADES, EXCHANGE STENT URETER(S) Left 10/14/2018    Procedure: COMBINED CYSTOSCOPY, RETROGRADES, EXCHANGE STENT URETER(S);  Cystoscopy and removal of left-sided stent.;  Surgeon: Stiven Olivo MD;  Location:  OR    COMBINED CYSTOSCOPY, RETROGRADES, URETEROSCOPY, INSERT STENT  1/6/2014    Procedure: COMBINED CYSTOSCOPY, RETROGRADES, URETEROSCOPY, INSERT STENT;  Cystyoscopy place left ureteral stent;  Surgeon: Jaun Kimble MD;  Location: [facility] OR    COMBINED CYSTOSCOPY, RETROGRADES, URETEROSCOPY, INSERT STENT Left 10/23/2018    Procedure: Video cystoscopy, left ureteroscopy with stone extraction;  Surgeon: Bull Mast MD;  Location:  OR    CYSTOSCOPY, URETEROSCOPY, COMBINED Right 9/23/2015    Procedure: COMBINED CYSTOSCOPY, URETEROSCOPY;  Surgeon: ORME Jett MD;  Location: [facility] OR    ENT SURGERY      ESOPHAGOSCOPY, GASTROSCOPY, DUODENOSCOPY (EGD), COMBINED  4/8/2013    Procedure: COMBINED ESOPHAGOSCOPY, GASTROSCOPY, DUODENOSCOPY (EGD), BIOPSY SINGLE OR MULTIPLE;  Gastroscopy;  Surgeon: Peter Pickard MD;  Location: [facility] GI    ESOPHAGOSCOPY, GASTROSCOPY, DUODENOSCOPY (EGD), COMBINED Left 10/28/2014    Procedure: COMBINED ESOPHAGOSCOPY, GASTROSCOPY, DUODENOSCOPY (EGD), BIOPSY SINGLE OR MULTIPLE;  Surgeon: Narcisa Ramirez MD;  Location:  OR    ESOPHAGOSCOPY, GASTROSCOPY, DUODENOSCOPY (EGD), COMBINED N/A 12/24/2018    Procedure: COMBINED ESOPHAGOSCOPY, GASTROSCOPY,  DUODENOSCOPY (EGD), BIOPSY SINGLE OR MULTIPLE;  Surgeon: Sonu Verduzco MD;  Location: [facility]    INJECT EPIDURAL TRANSFORAMINAL LUMBAR / SACRAL EA ADDITIONAL LEVEL Left 3/18/2021    Procedure: Left L5/S1 transforaminal injection for selective L5 nerve root block;  Surgeon: Eliza Pagan MD;  Location: [facility] OR    LAPAROSCOPIC CHOLECYSTECTOMY  11/20/2014    [facility], Dr. Ramirez    LASER HOLMIUM LITHOTRIPSY URETER(S), INSERT STENT, COMBINED  4/2/2014    Procedure: COMBINED CYSTOSCOPY, URETEROSCOPY, LASER HOLMIUM LITHOTRIPSY URETER(S), INSERT STENT;  Cystoscopy,Left Ureteral Stent Removal,Left Ureteroscopy with Laser Lithotripsy,Left Ureter Stent Placement;  Surgeon: ROME Jett MD;  Location: [facility] OR    Transurethral stone resection  03/11/2011     ACE/ARB/ARNI NOT PRESCRIBED (INTENTIONAL)  amLODIPine (NORVASC) 5 MG tablet  blood glucose (NO BRAND SPECIFIED) test strip  Continuous Blood Gluc  (FREESTYLE COLTEN 2 READER) ZEINAB  Continuous Blood Gluc Sensor (FREESTYLE COLTEN 2 SENSOR) MISC  doxycycline monohydrate (MONODOX) 100 MG capsule  empagliflozin (JARDIANCE) 10 MG TABS tablet  gabapentin (NEURONTIN) 600 MG tablet  insulin glargine (LANTUS PEN) 100 UNIT/ML pen  insulin pen needle (BD SAMANTHA U/F) 32G X 4 MM miscellaneous  LORazepam (ATIVAN) 1 MG tablet  omeprazole (PRILOSEC) 20 MG DR capsule  ondansetron (ZOFRAN ODT) 4 MG ODT tab  QUEtiapine (SEROQUEL) 100 MG tablet  QUEtiapine (SEROQUEL) 300 MG tablet  rosuvastatin (CRESTOR) 40 MG tablet  testosterone (ANDROGEL 1.62 % PUMP) 20.25 MG/ACT gel  thin (NO BRAND SPECIFIED) lancets      Allergies   Allergen Reactions    Haldol [Haloperidol] Other (See Comments)     Makes patient very angry and anxious    Adhesive Tape Hives    Percocet [Oxycodone-Acetaminophen] Nausea and Vomiting    Prednisone Other (See Comments) and Hives     Suicidal ideation    Risperidone Other (See Comments)    Tramadol Hcl Nausea and Vomiting    Droperidol Anxiety    Seroquel  "[Quetiapine] Palpitations     Spent 2 weeks in the hospital due to having seroquel, caused palpitations and QT prolongation     Family History  Family History   Problem Relation Age of Onset    Gastrointestinal Disease Father         Crohn's Disease    Cancer Father         Liver Cancer    Other Cancer Father         Liver    Obesity Father     Cancer Maternal Grandmother         lympoma    Diabetes Maternal Grandmother     Mental Illness Maternal Grandmother     Other Cancer Maternal Grandmother         Non Hodgkins Lymphoma    Diabetes Maternal Grandfather     Hypertension Maternal Grandfather     Prostate Cancer Maternal Grandfather     Hyperlipidemia Maternal Grandfather     Heart Disease Paternal Grandfather         heart disease    Hypertension Brother     Other Cancer Brother         Esophagecial    Diabetes Brother     Obesity Brother     Hyperlipidemia Mother     Mental Illness Mother     Anxiety Disorder Mother     Anesthesia Reaction Mother         Vomiting/Nausea    Other Cancer Mother     Hypertension Brother     Other Cancer Brother     Other Cancer Paternal Half-Brother     No Known Problems Sister      Social History   Social History     Tobacco Use    Smoking status: Every Day     Packs/day: 0.50     Years: 5.00     Additional pack years: 0.00     Total pack years: 2.50     Types: Other, Cigarettes     Start date: 2011     Last attempt to quit: 2022     Years since quittin.1     Passive exposure: Never    Smokeless tobacco: Former     Types: Chew     Quit date: 2019    Tobacco comments:     Just nicotine gum currently. 23   Vaping Use    Vaping Use: Former    Substances: Nicotine, Flavoring    Devices: Refillable tank   Substance Use Topics    Alcohol use: No    Drug use: Not Currently     Types: Marijuana, Opiates     Comment: denies using oxy or other opiates_\"not for years\"         A medically appropriate review of systems was performed with pertinent positives and " negatives noted in the HPI, and all other systems negative.    Physical Exam   BP: 127/88  Pulse: 118  Temp: 98.2  F (36.8  C)  Resp: 20  SpO2: 94 %  Physical Exam  Vitals and nursing note reviewed.   Constitutional:       General: He is in acute distress.      Appearance: He is not diaphoretic.      Comments: Adult patient, pacing back-and-forth in the hallway, having difficulty holding still, acutely anxious   HENT:      Head: Atraumatic.   Eyes:      General: No scleral icterus.     Pupils: Pupils are equal, round, and reactive to light.   Cardiovascular:      Rate and Rhythm: Normal rate.      Pulses: Normal pulses.      Heart sounds: No murmur heard.  Pulmonary:      Effort: No respiratory distress.      Breath sounds: Normal breath sounds.   Abdominal:      General: Bowel sounds are normal.      Palpations: Abdomen is soft.      Tenderness: There is no abdominal tenderness.   Musculoskeletal:         General: No tenderness.   Skin:     General: Skin is warm.      Findings: No rash.   Neurological:      General: No focal deficit present.   Psychiatric:      Comments: Unremarkable appearance.  Anxious with some psychomotor agitation and pacing noted.  No SI/HI.  No AH.  Not physically aggressive.  Does require frequent redirection but does cooperate.  Insight slightly impaired.         ED Course, Procedures, & Data      Procedures         Mental Health Risk Assessment        PSS-3      Date and Time Over the past 2 weeks have you felt down, depressed, or hopeless? Over the past 2 weeks have you had thoughts of killing yourself? Have you ever attempted to kill yourself? When did this last happen? User   10/29/23 2055 no no yes -- RMK                Suicide assessment completed by mental health (D.E.C., LCSW, etc.)       Results for orders placed or performed during the hospital encounter of 10/29/23   Glucose by meter     Status: Abnormal   Result Value Ref Range    GLUCOSE BY METER POCT 186 (H) 70 - 99 mg/dL  "  Results for orders placed or performed during the hospital encounter of 10/26/23   Comprehensive metabolic panel     Status: Abnormal   Result Value Ref Range    Sodium 139 135 - 145 mmol/L    Potassium 3.7 3.4 - 5.3 mmol/L    Carbon Dioxide (CO2) 20 (L) 22 - 29 mmol/L    Anion Gap 15 7 - 15 mmol/L    Urea Nitrogen 25.7 (H) 6.0 - 20.0 mg/dL    Creatinine 0.94 0.51 - 1.17 mg/dL    GFR Estimate 82 >60 mL/min/1.73m2    Calcium 9.7 8.6 - 10.0 mg/dL    Chloride 104 98 - 107 mmol/L    Glucose 144 (H) 70 - 99 mg/dL    Alkaline Phosphatase 83 35 - 129 U/L    AST 53 (H) 0 - 45 U/L    ALT 50 0 - 70 U/L    Protein Total 7.6 6.4 - 8.3 g/dL    Albumin 4.5 3.5 - 5.2 g/dL    Bilirubin Total 0.2 <=1.2 mg/dL    Narrative    The generation of reference intervals for this test is currently based on binary male or female sex. If the electronic health record information indicates another gender identity or if Legal Sex is recorded as \"Unknown\", both male and female reference intervals are provided where applicable, and should be considered according to the individual's appropriate clinical context.   Ethyl Alcohol Level     Status: Normal   Result Value Ref Range    Alcohol ethyl <0.01 <=0.01 g/dL   CBC with platelets and differential     Status: Abnormal   Result Value Ref Range    WBC Count 13.7 (H) 4.0 - 11.0 10e3/uL    RBC Count 4.99 3.80 - 5.90 10e6/uL    Hemoglobin 14.1 11.7 - 17.7 g/dL    Hematocrit 42.1 35.0 - 53.0 %    MCV 84 78 - 100 fL    MCH 28.3 26.5 - 33.0 pg    MCHC 33.5 31.5 - 36.5 g/dL    RDW 15.6 (H) 10.0 - 15.0 %    Platelet Count 302 150 - 450 10e3/uL    % Neutrophils 66 %    % Lymphocytes 28 %    % Monocytes 5 %    % Eosinophils 0 %    % Basophils 0 %    % Immature Granulocytes 1 %    NRBCs per 100 WBC 0 <1 /100    Absolute Neutrophils 8.9 (H) 1.6 - 8.3 10e3/uL    Absolute Lymphocytes 3.8 0.8 - 5.3 10e3/uL    Absolute Monocytes 0.7 0.0 - 1.3 10e3/uL    Absolute Eosinophils 0.1 0.0 - 0.7 10e3/uL    Absolute " "Basophils 0.1 0.0 - 0.2 10e3/uL    Absolute Immature Granulocytes 0.1 <=0.4 10e3/uL    Absolute NRBCs 0.0 10e3/uL    Narrative    The generation of reference intervals for this test is currently based on binary male or female sex. If the electronic health record information indicates another gender identity or if Legal Sex is recorded as \"Unknown\", both male and female reference intervals are provided where applicable, and should be considered according to the individual's appropriate clinical context.   Lactic Acid STAT     Status: Abnormal   Result Value Ref Range    Lactic Acid 2.4 (H) 0.7 - 2.0 mmol/L   Lactic acid whole blood     Status: Normal   Result Value Ref Range    Lactic Acid 1.9 0.7 - 2.0 mmol/L   Urine Drug Screen Panel     Status: Abnormal   Result Value Ref Range    Amphetamines Urine Screen Negative Screen Negative    Barbituates Urine Screen Negative Screen Negative    Benzodiazepine Urine Screen Negative Screen Negative    Cannabinoids Urine Screen Positive (A) Screen Negative    Cocaine Urine Screen Negative Screen Negative    Fentanyl Qual Urine Screen Negative Screen Negative    Opiates Urine Screen Negative Screen Negative    PCP Urine Screen Negative Screen Negative   Glucose by meter     Status: Abnormal   Result Value Ref Range    GLUCOSE BY METER POCT 135 (H) 70 - 99 mg/dL   Glucose by meter     Status: Abnormal   Result Value Ref Range    GLUCOSE BY METER POCT 172 (H) 70 - 99 mg/dL   Glucose by meter     Status: Abnormal   Result Value Ref Range    GLUCOSE BY METER POCT 156 (H) 70 - 99 mg/dL   Glucose by meter     Status: Abnormal   Result Value Ref Range    GLUCOSE BY METER POCT 224 (H) 70 - 99 mg/dL   CBC with platelets differential     Status: Abnormal    Narrative    The following orders were created for panel order CBC with platelets differential.  Procedure                               Abnormality         Status                     ---------                               " -----------         ------                     CBC with platelets and d...[[id number]]  Abnormal            Final result                 Please view results for these tests on the individual orders.   Urine Drug Screen     Status: Abnormal    Narrative    The following orders were created for panel order Urine Drug Screen.  Procedure                               Abnormality         Status                     ---------                               -----------         ------                     Urine Drug Screen Panel[[id number]]      Abnormal            Final result                 Please view results for these tests on the individual orders.     Medications   OLANZapine zydis (zyPREXA) ODT tab 5-10 mg (5 mg Oral $Given 10/29/23 2116)   OLANZapine zydis (zyPREXA) ODT tab 5 mg (5 mg Oral $Given 10/29/23 2258)     Labs Ordered and Resulted from Time of ED Arrival to Time of ED Departure   GLUCOSE BY METER - Abnormal       Result Value    GLUCOSE BY METER POCT 186 (*)    GLUCOSE MONITOR NURSING POCT     No orders to display          Critical care was not performed.     Medical Decision Making  The patient's presentation was of high complexity (a chronic illness severe exacerbation, progression, or side effect of treatment).    The patient's evaluation involved:  an assessment requiring an independent historian (see separate area of note for details)  ordering and/or review of 1 test(s) in this encounter (see separate area of note for details)  discussion of management or test interpretation with another health professional (see separate area of note for details)    The patient's management necessitated high risk (a decision regarding hospitalization).    Assessment & Plan    Patient presents to the emergency department today for the above complaints.  On my evaluation he does appear to be acutely anxious and is pacing back-and-forth.  He did receive 1 mg of IM Versed from paramedics prior to arrival.    Record review  "shows that he had a recent psychiatric clinic visit (virtual visit) dated October 11 where his psychiatric nurse practitioner discontinued his Klonopin.  He seems fairly focused on Klonopin or other medications to help with anxiety.  Of note, he has multiple \"allergies\" including Haldol, Risperdal, droperidol, and Seroquel, despite the fact that he is on Seroquel.   He did tell nursing previously that hydroxyzine is ineffective for him and he is not interested in hydroxyzine.  I did offer him 1 dose of oral Zyprexa here in the emergency department.  Do not believe that benzos are necessarily appropriate at this time.  Patient did take the initial dose of Zyprexa and this helped temporarily, though then he again became anxious and started pacing and asked for more Zyprexa.  As initial dose was only 5 mg, we did give 1 more dose of 5 mg of Zyprexa.    Patient was seen by the [facility] , please see their note for full details.    Patient signed out at change of shift pending [facility]  recommendations (she had called group home and is pending a callback from the group [place]). Expect likely discharge.     I have reviewed the nursing notes. I have reviewed the findings, diagnosis, plan and need for follow up with the patient.    New Prescriptions    No medications on file       Final diagnoses:   Anxiety   Lives in group home       Ana Luisa Alaniz MD  [facility] EMERGENCY DEPARTMENT  10/29/2023     Ana Luisa Alaniz MD  11/01/23 0001    "

## 2023-10-30 NOTE — PROGRESS NOTES
"      Triage & Transition Services, Extended Care     Therapy Progress Note    Patient: Prakash goes by \"Prakash,\" uses she/her pronouns  Date of Service: October 30, 2023  Site of Service: [facility] EMERGENCY DEPARTMENT                             [facility]ED[facility]-A  Patient was seen in-person.     Presenting problem:   Prakash is followed related to Boarding Status. Please see initial DEC/[facility] Crisis Assessment completed by Jeniffer Webber on 10.29.23 for complete assessment information. Notable concerns include Paranoia, Anxiety .     Individuals Present: Prakash & mAy Jack    Session start: 9:00 am   Session end: 9:16 am   Session duration in minutes: 16  Session number: 1  Anticipated number of sessions or this episode of care: 1-3  CPT utilized: 29495 - Psychotherapy (with patient) - 30 (16-37*) min    Current Presentation:   Pt was sitting on his bed. Pt did not endorse SI, HI, AH, VH. Pt reported being discharged or the hospital at 1 pm yesterday and returning around 8 pm. Pt stated they \"messed up, by declining a psychiatry consult.\". Pt returned to  and experienced similar symptoms of paranoia and anxiety. Pt stated they have a psychiatry appointment set up for 12 pm tomorrow. Pt was able to contract for safety.           [place] Suicide Severity Rating Scale Since Last Contact: 10/30/23  Suicidal Ideation (Since Last Contact)  1. Wish to be Dead (Since Last Contact): No  2. Non-Specific Active Suicidal Thoughts (Since Last Contact): No  Suicidal Behavior (Since Last Contact)  Actual Attempt (Since Last Contact): No  Has subject engaged in non-suicidal self-injurious behavior? (Since Last Contact): No  Interrupted Attempts (Since Last Contact): No  Aborted or Self-Interrupted Attempt (Since Last Contact): No  Preparatory Acts or Behavior (Since Last Contact): No  Suicide (Since Last Contact): No     C-SSRS Risk (Since Last Contact)  Calculated C-SSRS Risk Score (Since Last Contact): No Risk " Indicated         Mental Status Exam:   Appearance: awake, alert and adequately groomed  Attitude: cooperative  Eye Contact: good  Mood: good  Affect: appropriate and in normal range  Speech: clear, coherent  Psychomotor Behavior: no evidence of tardive dyskinesia, dystonia, or tics  Thought Process:  logical  Associations: no loose associations  Thought Content: no evidence of suicidal ideation or homicidal ideation  Insight: good  Judgement: intact  Oriented to: time, person, and place  Attention Span and Concentration: intact  Recent and Remote Memory: intact    Diagnosis:   Anxiety F41.9     chizophrenia, schizoaffective, chronic with acute exacerbation (H) F25.9     Therapeutic Intervention(s):   Provided active listening, unconditional positive regard, and validation. Engaged in safety planning.     Treatment Objective(s) Addressed:   The focus of this session was on rapport building, safety planning, and assessing safety .     Progress Towards Goals:   Patient reports stable symptoms. Patient is making progress towards treatment goals as evidenced by engaging in safety planning.         General Recommendations:   Continue to monitor for harm. Consider: Use a positive, direct and calm approach. Pt's tend to match the energy/mood of the staff. Keep focus positive and upbeat, Listen in a neutral, non-judgmental way. Offer reassurance, and Be mindful of your nonverbal cues (body language, facial expressions)    Plan:   Discharge: Pt will return to  and follow - up with out pt psychiatry. Pt is voluntary and has reached stabilization to return to .     Plan for Care reviewed with Assigned Medical Provider? Yes. Provider, Dr. Vázquez, response: agreeable     Amy Jack   Licensed Mental Health Professional (LMHP), [facility]  [phone]     Aftercare Plan  Follow up with psychiatry and CM on Tuesday 10/31/23.     to continue referrals to therapy.          If I am feeling unsafe or I am in a  crisis, I will:   Contact my established care providers   Call the National Suicide Prevention Lifeline: 550.829.7503   Go to the nearest emergency room   Call 911      Warning signs that I or other people might notice when a crisis is developing for me:     Increase in feelings of paranoia.    Increase in feelings of anxiety  Not taking care of self  Not sleeping   I am having increasing suicidal thoughts that turn to plans with intent or means  I am having additional urges to self-harm    My emotions are of hopelessness; feeling like there's no way out.  Rage or anger.  Engaging in risky activities without thinking  Withdrawing from family/friends  Dramatic mood swings  Drastic personality changes   Use of alcohol or drugs  Postings on social media  Neglect of personal hygiene or cares      Things I am able to do on my own to cope or help me feel better:    Spending quality time with loved ones  Staying hydrated  Eating balanced meals  Going for a walk every day  Take care of daily responsibilities/needs  Focus on positive self-talk vs negative self-talk     Things that I am able to do with others to cope or help me better:   Exercise  Music  Deep breathing  Meditations  Journal  Self-regulate  Self check-in  Ask for help     Things I can use or do for distraction:   Reach out to/spend time with family, friends  Shower  Exercise  Chores or do a project  Listen to music  Watch movie/TV  Listening to music  Journaling  Reading a book  Meditating  Call a friend     Changes I can make to support my mental health and wellness:    -I will abstain from all mood altering chemicals not currently prescribed to me   -I will attend scheduled mental health therapy and psychiatric appointments and follow all   recommendations  -I will commit to 30 minutes of self care daily - this can be as simple as taking a shower, going for a   walk, cooking a meal, read, writing, etc  -I will practice square breathing when I begin to feel  anxious - in breath through the nose for the count   of 4 and the first line on the square. Out breath through the mouth for the count of 4 for the second line   of the square. Repeat to complete the square. Repeat the square as many times as needed.  - I will use distraction skills of: going for walks, watching TV, spending time outside, calling a friend or   family member  -Use community resources, including hotline numbers, [facility] crisis and support meetings  -Maintain a daily schedule/routine  -Practice deep breathing skills  -Download a meditation hiren and spend 15-20 minutes per day mediating/relaxing. Some apps to   download include: Calm, Headspace and Insight Timer. All 3 of these apps have free version     Reduce Extreme Emotion  QUICKLY:  Changing Your Body Chemistry      T:  Change your body Temperature to change your autonomic nervous system   Use Ice Water to calm yourself down FAST   Put your face in a bowl of ice water (this is the best way; have the person keep his/her face in ice water for 30-45 seconds - initial research is showing that the longer s/he can hold her/his face in the water, the better the response), or   Splash ice water on your face, or hold an ice pack on your face      I:  Intensely exercise to calm down a body revved up by emotion   Examples: running, walking fast, jumping, playing basketball, weight lifting, swimming, calisthenics, etc.   Engage in exercises that DO NOT include violent behaviors. Exercises that utilize violent behaviors tend to function as  behavioral rehearsal,  and rather than calming the person down, may actually  rev  the person up more, increasing the likelihood of violence, and lessening the likelihood that they will  burn off  energy     P:  Progressively relax your muscles   Starting with your hands, moving to your forearms, upper arms, shoulders, neck, forehead, eyes, cheeks and lips, tongue and teeth, chest, upper back, stomach, buttocks, thighs,  calves, ankles, feet   Tense (10 seconds,   of the way), then relax each muscle (all the way)   Notice the tension   Notice the difference when relaxed (by tensing first, and then relaxing, you are able to get a more thorough relaxation than by simply relaxing)      P: Paced breathing to relax   The standard technique is to begin with counting the number of steps one takes for a typical inhale, then counting the steps one takes for a typical exhale, and then lengthening the amount of steps for the exhalation by one or two steps.  OR  Repeat this pattern for 1-2 minutes  Inhale for four (4) seconds   Exhale for six (6) to eight (8) seconds   Research demonstrated that one can change one's overall level of anxiety by doing this exercise for even a few minutes per day       People in my life that I can ask for help:   Family  Friends  Providers     Your [facility] has a mental health crisis team you can call 24/7:   [facility] Crisis Line Number: [phone]     Other things that are important when I'm in crisis:  Ask for help  Try to eat, drink fluids, and sleep first to assist with de-escalation.     Additional resources and information:      Mental Health Apps  My3  https://PushCoin.org/     VirtualHopeBox  https://Aconexorg/apps/virtual-hope-box/        Professionals or Agencies I Can Contact During A Crisis:        Crisis Lines  Call or Text 657 - National Suicide and Crisis Lifeline     Crisis Text Line  Text 686263  You will be connected with a trained live crisis counselor to provide support.     The [employer] (LGBTQ Youth Crisis Line)  5.144.951.4552  text START to 328-896     National Howard on Mental Illness (GILMA)  380.257.0075 or 7.885.GILMA.HELPS     National Suicide Prevention Lifeline at 9-023-093-TALK (1763)      Throughout  [place]: call **CRISIS (**670414)      Crisis Text Line: is available for free, 24/7 by texting [place] to 451877     [employer]  [employer] - [place]  Linking  "people to mental health and substance use disorder resources  fastMoneyDesktopckDecohuntn.Estimize      [place] Mental Health Warm Line  Peer to peer support  Monday thru Saturday, 12 pm to 10 pm  393.581.7095 or 7.449.141.1718  Text \"Support\" to 14638     National [place] on Mental Illness (www.[place].jhonatan.org): 377.198.9660 or 171-442-7109     [facility] Phone (free remote counseling): 711.447.6485 Web address:   https://Jumpzter.org/      www.Entelos (filter for insurance, gender preference, etc.)     [facility]   [phone]  Intake appointment will be virtual, following appointments can be in person or virtual.   **IMMEDIATE OPENINGS**     [facility]  [phone]  *offers individual therapy, medication management and Mental Health Case Workers; can self refer     [facility]  [phone]  *Immediate Openings     [facility]  [phone]  *Immediate Openings     [facility]  [phone]  *Immediate Openings     Please follow up with scheduled providers to ensure all necessary paperwork is filled out prior to your   scheduled telehealth appointments.      Coordinators from [facility] will be calling within two business days to ensure   that you have the resources you may need or provide assistance with scheduling (Phone number: [phone].).     Remember: give the referrals 3 sessions prior to calling it quits. Do you trust them? Do you feel   understood? Do you think they can help? Check in with yourself after each session     Please reach out to the [facility]([phone]) regarding further mental health appointment needs for this emergency department visit.     [facility] SCHEDULING:  Today you were seen by a licensed mental health professional through [facility] Transition sevices, [facility] ([facility])  for a crisis assessment in the Emergency Department at " [facility].  It is recommended that you follow up with your estabished providers (psychiatrist, menta health therapist, and/or primary care doctor - as relevant) as soon as possible. Coordinators from [facility] will be calling you in the next 24-48 hours to ensure that you have the resources you need.  You can so contact [facility] coordinators directly at [phone].     [facility] maintains an extensive network of licensed behavioral health providers to connect patients with the services they need.  We do not charge providers a fee to participate in our referral network.  We match patients with providers based on a patient s specific needs, insurance coverage, and location.  Our first effort will be to refer you to a provider within your care system, and will utilize providers outside your care system as needed.

## 2023-10-30 NOTE — TELEPHONE ENCOUNTER
Patient called and wanted to know if we received Mychart message from earlier. Updated patient that we have but provider is out today.

## 2023-10-30 NOTE — ED NOTES
Calls for collateral/contact:    Domenica, , [phone]   (LM x 2 prior to 0130 and No Answer at 0640)    Nerga Prasad, mother, [phone]     (No answer at 0641)

## 2023-10-30 NOTE — ED NOTES
Emergency Department Patient Sign-out       Brief HPI:  This is a 33 year old adult signed out to me by Dr. Alaniz .  See initial ED Provider note for details of the presentation.            Significant Events prior to my assuming care: Pt just discharged from obs, here with anxiety. Received versed PTA, zydis here. Has psych follow up on Tuesday. Likely back to group home, has been seen by [facility].      Exam:   Patient Vitals for the past 24 hrs:   BP Temp Temp src Pulse Resp SpO2   10/29/23 2102 127/88 98.2  F (36.8  C) Oral 118 20 94 %           ED RESULTS:   Results for orders placed or performed during the hospital encounter of 10/29/23 (from the past 24 hour(s))   Glucose by meter     Status: Abnormal    Collection Time: 10/29/23 10:12 PM   Result Value Ref Range    GLUCOSE BY METER POCT 186 (H) 70 - 99 mg/dL   Urine Drug Screen     Status: None (In process)    Collection Time: 10/29/23 11:38 PM    Narrative    The following orders were created for panel order Urine Drug Screen.  Procedure                               Abnormality         Status                     ---------                               -----------         ------                     Urine Drug Screen Panel[[id number]]                          In process                   Please view results for these tests on the individual orders.       ED MEDICATIONS:   Medications   OLANZapine zydis (zyPREXA) ODT tab 5-10 mg (5 mg Oral $Given 10/29/23 2116)   OLANZapine zydis (zyPREXA) ODT tab 5 mg (5 mg Oral $Given 10/29/23 2258)         Impression:    ICD-10-CM    1. Anxiety  F41.9       2. Lives in group home  Z59.3           Plan:    Patient ok for discharge back to group home, please see 's note. We will discharge when able to contact the group home.        MD Epifanio Oconnell, Marv Simpson MD  10/30/23 2839

## 2023-10-30 NOTE — PLAN OF CARE
Ayana Laneh  October 30, 2023  Plan of Care Hand-off Note     Patient Care Path: discharge    Plan for Care:   After therapeutic assessment, intervention and aftercare planning by ED care team and [facility] and in consultation with attending provider, the patient's circumstances and mental state were appropriate for outpatient management. It is the recommendation of this clinician that pt discharge with OP MH support. A this time the pt is not presenting as an acute risk to self or others due to the following factors: Patient denied SI, NSSI, and HI.  He had mild AH and paranoia. Patient would like to return to group home and to follow up with outpatient providers.    Identified Goals and Safety Issues: Return to group home once  is reached.    Overview:    Domenica, , [phone] (LM x 2 prior to 0130 and No Answer at 0640)  Negra Prasad, mother, [phone]   (No answer at 0641)     Dial numbers for the patient - do not hand them a phone and walk away    Legal Status: Legal Status at Admission: Voluntary/Patient has signed consent for treatment    Psychiatry Consult:  Not consented       Updated   regarding plan of care.           Jeniffer Webber, [facility]

## 2023-10-30 NOTE — TELEPHONE ENCOUNTER
[facility] Call Center    Phone Message    May a detailed message be left on voicemail: yes     Reason for Call: Other: pt called and wanted the nurse to prescribe some medication and if the nurse would give Randall a call      Action Taken: Other: nurse pool    Travel Screening: Not Applicable

## 2023-10-30 NOTE — ED NOTES
"0100- pt continues to pace back and forth in EDH attempted to elope. Pt states his GH is not going to answer the phone and he is going back to his GH since he is on a hold. Pt reports he keeps coming back to the ER because the GH doesn't have his Ativan medication. Pt reports Education regarding obtaining new prescription can be done through his mental health team post discharged from ED. Pt states \"my anxiety is really bad after the ativan wears off.\" Per pt, he has not sleep for weeks, he is up all night because of his severe anxiety. Pt agrees to stay in the ED until ER staff able to reach his GH under one condition which to give him a dose of Ativan in the morning before discharge. Pt also willing to take PO melatonin to assist his sleep. MD aware of pt request. Pt resting and calm    "

## 2023-10-30 NOTE — DISCHARGE INSTRUCTIONS
Aftercare Plan  Follow up with psychiatry and CM on Tuesday 10/31/23.     to continue referrals to therapy.          If I am feeling unsafe or I am in a crisis, I will:   Contact my established care providers   Call the National Suicide Prevention Lifeline: 173.218.5011   Go to the nearest emergency room   Call 911      Warning signs that I or other people might notice when a crisis is developing for me:     Increase in feelings of paranoia.    Increase in feelings of anxiety  Not taking care of self  Not sleeping   I am having increasing suicidal thoughts that turn to plans with intent or means  I am having additional urges to self-harm    My emotions are of hopelessness; feeling like there's no way out.  Rage or anger.  Engaging in risky activities without thinking  Withdrawing from family/friends  Dramatic mood swings  Drastic personality changes   Use of alcohol or drugs  Postings on social media  Neglect of personal hygiene or cares      Things I am able to do on my own to cope or help me feel better:    Spending quality time with loved ones  Staying hydrated  Eating balanced meals  Going for a walk every day  Take care of daily responsibilities/needs  Focus on positive self-talk vs negative self-talk     Things that I am able to do with others to cope or help me better:   Exercise  Music  Deep breathing  Meditations  Journal  Self-regulate  Self check-in  Ask for help     Things I can use or do for distraction:   Reach out to/spend time with family, friends  Shower  Exercise  Chores or do a project  Listen to music  Watch movie/TV  Listening to music  Journaling  Reading a book  Meditating  Call a friend     Changes I can make to support my mental health and wellness:    -I will abstain from all mood altering chemicals not currently prescribed to me   -I will attend scheduled mental health therapy and psychiatric appointments and follow all   recommendations  -I will commit to 30 minutes of self  care daily - this can be as simple as taking a shower, going for a   walk, cooking a meal, read, writing, etc  -I will practice square breathing when I begin to feel anxious - in breath through the nose for the count   of 4 and the first line on the square. Out breath through the mouth for the count of 4 for the second line   of the square. Repeat to complete the square. Repeat the square as many times as needed.  - I will use distraction skills of: going for walks, watching TV, spending time outside, calling a friend or   family member  -Use community resources, including hotline numbers, [facility] crisis and support meetings  -Maintain a daily schedule/routine  -Practice deep breathing skills  -Download a meditation hiren and spend 15-20 minutes per day mediating/relaxing. Some apps to   download include: Calm, Headspace and Insight Timer. All 3 of these apps have free version     Reduce Extreme Emotion  QUICKLY:  Changing Your Body Chemistry      T:  Change your body Temperature to change your autonomic nervous system   Use Ice Water to calm yourself down FAST   Put your face in a bowl of ice water (this is the best way; have the person keep his/her face in ice water for 30-45 seconds - initial research is showing that the longer s/he can hold her/his face in the water, the better the response), or   Splash ice water on your face, or hold an ice pack on your face      I:  Intensely exercise to calm down a body revved up by emotion   Examples: running, walking fast, jumping, playing basketball, weight lifting, swimming, calisthenics, etc.   Engage in exercises that DO NOT include violent behaviors. Exercises that utilize violent behaviors tend to function as  behavioral rehearsal,  and rather than calming the person down, may actually  rev  the person up more, increasing the likelihood of violence, and lessening the likelihood that they will  burn off  energy     P:  Progressively relax your muscles   Starting with  your hands, moving to your forearms, upper arms, shoulders, neck, forehead, eyes, cheeks and lips, tongue and teeth, chest, upper back, stomach, buttocks, thighs, calves, ankles, feet   Tense (10 seconds,   of the way), then relax each muscle (all the way)   Notice the tension   Notice the difference when relaxed (by tensing first, and then relaxing, you are able to get a more thorough relaxation than by simply relaxing)      P: Paced breathing to relax   The standard technique is to begin with counting the number of steps one takes for a typical inhale, then counting the steps one takes for a typical exhale, and then lengthening the amount of steps for the exhalation by one or two steps.  OR  Repeat this pattern for 1-2 minutes  Inhale for four (4) seconds   Exhale for six (6) to eight (8) seconds   Research demonstrated that one can change one's overall level of anxiety by doing this exercise for even a few minutes per day       People in my life that I can ask for help:   Family  Friends  Providers     Your [facility] has a mental health crisis team you can call 24/7:   [facility] Crisis Line Number: [phone]     Other things that are important when I'm in crisis:  Ask for help  Try to eat, drink fluids, and sleep first to assist with de-escalation.     Additional resources and information:      Mental Health Apps  My3  https://FarmDrop.org/     VirtualHopeBox  https://[email or web address]/apps/virtual-hope-box/        Professionals or Agencies I Can Contact During A Crisis:        Crisis Lines  Call or Text 159 - National Suicide and Crisis Lifeline     Crisis Text Line  Text 496482  You will be connected with a trained live crisis counselor to provide support.     [employer] (LGBTQ Youth Crisis Line)  3.108.952.4457  text START to 381-124     National [place] on Mental Illness (GILMA)  545.967.0908 or 7.888.GILMA.HELPS     National Suicide Prevention Lifeline at 7-057-154-TALK (6031)      Throughout  " [place]: call **CRISIS (**275986)      Crisis Text Line: is available for free, 24/7 by texting [place] to 994064     Community Resources  Fast Tracker  Linking people to mental health and substance use disorder resources  Ratify.Musicshake      [place] Mental Health Warm Line  Peer to peer support  Monday thru Saturday, 12 pm to 10 pm  618.507.5474 or 1.015.417.6608  Text \"Support\" to 85053     National [place] on Mental Illness (www.[place].jhonatan.org): 847.772.4054 or 199-653-3731     [facility] Phone (free remote counseling): 527.957.1821 Web address:   https://g4interactive.org/      www.[employer] (filter for insurance, gender preference, etc.)     [facility]   [phone]  Intake appointment will be virtual, following appointments can be in person or virtual.   **IMMEDIATE OPENINGS**     [facility]  [phone]  *offers individual therapy, medication management and Mental Health Case Workers; can self refer     [facility]  [phone]  *Immediate Openings     [facility]  [phone]  *Immediate Openings     [facility]  [phone]  *Immediate Openings     Please follow up with scheduled providers to ensure all necessary paperwork is filled out prior to your   scheduled telehealth appointments.      Coordinators from [facility] will be calling within two business days to ensure   that you have the resources you may need or provide assistance with scheduling (Phone number: [phone].).     Remember: give the referrals 3 sessions prior to calling it quits. Do you trust them? Do you feel   understood? Do you think they can help? Check in with yourself after each session     Please reach out to the [facility]([phone]) regarding further mental health appointment needs for this emergency department visit.     [facility] SCHEDULING:  Today you were seen by a licensed mental " health professional through Aaron hernandez, [facility] ([facility])  for a crisis assessment in the Emergency Department at [facility].  It is recommended that you follow up with your estabished providers (psychiatrist, menta health therapist, and/or primary care doctor - as relevant) as soon as possible. Coordinators from [facility] will be calling you in the next 24-48 hours to ensure that you have the resources you need.  You can so contact [facility] coordinators directly at [phone].     [facility] maintains an extensive network of licensed behavioral health providers to connect patients with the services they need.  We do not charge providers a fee to participate in our referral network.  We match patients with providers based on a patient s specific needs, insurance coverage, and location.  Our first effort will be to refer you to a provider within your care system, and will utilize providers outside your care system as needed.

## 2023-10-30 NOTE — ED NOTES
Called Domenica from [facility] at [phone]. No answer. Left Domenica voicemail message to return call at the [facility] ER regarding Randall discharge back to [facility].

## 2023-10-30 NOTE — CONSULTS
"Diagnostic Evaluation Consultation  Crisis Assessment    Patient Name: Ayana Prasad  Age:  33 year old  Legal Sex: female  Gender Identity: transgender male  Pronouns: he/him/his  Race: White  Ethnicity: Not  or   Language: English      Patient was assessed: In person      Patient location: [facility] EMERGENCY DEPARTMENT                             URED[facility]-A    Referral Data and Chief Complaint  Ayana Prasad presents to the ED via EMS. Patient is presenting to the ED for the following concerns: Paranoia, Anxiety.   Factors that make the mental health crisis life threatening or complex are:  Patient stated, \"When I left here, the meds wore off and I got wired and anxious, again.  I'm really hyper, scared, and confused.  When I'm hyper, I make bad decisions.  Last time I left the house with no shows on and walked 5 miles until I had blisters.  When I'm scared, I'm paranoid.  I just didn't mean any of this.  I get angry quickly.  Domenica from the group home had me sent here.  I want to go back home.\"  Pt is oriented to himself, time/day, location and how he got here.  Patient slept in the ED, last night.  He last ate in the ED.  He stated he hears voices and they are non-command type.  He denied other symptoms of psychosis.  He's hyperactive and passing.  He asked, \"can I get out of this room, now?\"  His insight, judgment, and impulse control were impaired..      Informed Consent and Assessment Methods  Explained the crisis assessment process, including applicable information disclosures and limits to confidentiality, assessed understanding of the process, and obtained consent to proceed with the assessment.  Assessment methods included conducting a formal interview with patient, review of medical records, collaboration with medical staff, and obtaining relevant collateral information from family and community providers when available.  : done     Patient response to interventions:    Coping " skills were attempted to reduce the crisis:  Medications and pacing.     History of the Crisis   Pt has a hx of schizoaffective disorder, bipolar type, borderline personality disorder, AVA, ADHD, PTSD and polysubstance abuse (THC, methamphetamines and opioids). Pt currently under MI commitment through [facility] [place] [facility]. Pt was sent into the ED this evening by his group home staff due to concerns for paranoia, racing thoughts and psychomotor agitation. They have concerns for substance abuse. During assessment, pt observed to be pacing back in forth. Pt appears anxious and agitated.  Endorses daily medical THC use, but denies any other recent substance use. Reports he is not sure if he was at the park with anyone else today. Denies SI, HI, NSSI. He does endorse AH, but has difficulty describing the content of the voices though denies they are command in nature. He reports his provider discontinued all of his medications and the only thing he is taking now is jardiance. Writer asked if there was anything they could do that would be helpful to ground the patient. Pt expressed he just wanted medication.    Brief Psychosocial History  Family:  Single, Children no  Support System:  Facility resident(s)/Staff  Employment Status:  unemployed  Source of Income:  unable to assess  Financial Environmental Concerns:  none  Current Hobbies:     Barriers in Personal Life:  mental health concerns, behavioral concerns    Significant Clinical History  Current Anxiety Symptoms:  racing thoughts, anxious  Current Depression/Trauma:  impaired decision making, avoidance, difficulty concentrating, irritable  Current Somatic Symptoms:  anxious, racing thoughts  Current Psychosis/Thought Disturbance:  auditory hallucinations, impulsive, inattentive, displaces blame, agitation  Current Eating Symptoms:  loss of appetite  Chemical Use History:  Alcohol: None  Benzodiazepines: None  Opiates: None  Cocaine: None  Marijuana: Daily  Other Use: None    Past diagnosis:  Bipolar Disorder, Schizophrenia, Substance Use Disorder, Personality Disorder, PTSD, Anxiety Disorder, ADHD, Depression  Family history:  No known history of mental health or chemical health concerns  Past treatment:  Individual therapy, Case management, Civil Commitment, Inpatient Hospitalization, Supportive Living Environment (group home, senior care house, etc), Psychiatric Medication Management, Primary Care  Details of most recent treatment:  Pt was most recently IP MH at [facility] from 8/10-8/28/23 for SI and 5/24/23-6/9/23 for SI. Pt has lived at [facility] for 3 years. Has PCP and psychiatry. Pt is currently under MI commitment through [facility]. Pt reportedly self-administers medication, per chart review it appears pt had requested discontinuation of medications except for seroquel which they felt was helpful.     Collateral Information  Is there collateral information:  Tiffani Metzger, , [phone] (LM x 2 prior to 0130 and No Answer at 0640)    Negra Prasad, mother, [phone]   (No answer at 0641)    Risk Assessment  Cole Suicide Severity Rating Scale Full Clinical Version:  Suicidal Ideation  Q1 Wish to be Dead (Lifetime): Yes  Q2 Non-Specific Active Suicidal Thoughts (Lifetime): Yes  3. Active Suicidal Ideation with any Methods (Not Plan) Without Intent to Act (Lifetime): No  Q4 Active Suicidal Ideation with Some Intent to Act, Without Specific Plan (Lifetime): No  Q5 Active Suicidal Ideation with Specific Plan and Intent (Lifetime): No  Q6 Suicide Behavior (Lifetime): yes     Suicidal Behavior (Lifetime)  Actual Attempt (Lifetime): Yes  Total Number of Actual Attempts (Lifetime): 4  Has subject engaged in non-suicidal self-injurious behavior? (Lifetime): No  Interrupted Attempts (Lifetime): No  Aborted or Self-Interrupted Attempt (Lifetime): No  Preparatory Acts or Behavior (Lifetime): Yes  Total Number of Preparatory Acts (Lifetime): 1  Preparatory Acts or  Behavior Description (Lifetime): asked people for weapons, in the past.    [place] Suicide Severity Rating Scale Recent:   Suicidal Ideation (Recent)  Q1 Wished to be Dead (Past Month): no  Q2 Suicidal Thoughts (Past Month): no     Suicidal Behavior (Recent)  Actual Attempt (Past 3 Months): No  Total Number of Actual Attempts (Past 3 Months): 0  Has subject engaged in non-suicidal self-injurious behavior? (Past 3 Months): No  Interrupted Attempts (Past 3 Months): No  Aborted or Self-Interrupted Attempt (Past 3 Months): No  Preparatory Acts or Behavior (Past 3 Months): No    Environmental or Psychosocial Events: ongoing abuse of substances, other life stressors, impulsivity/recklessness, unemployment/underemployment  Protective Factors: Protective Factors: strong bond to family unit, community support, or employment, lives in a responsibly safe and stable environment, supportive ongoing medical and mental health care relationships    Does the patient have thoughts of harming others? Feels Like Hurting Others: no  Previous Attempt to Hurt Others: no  Is the patient engaging in sexually inappropriate behavior?: no    Is the patient engaging in sexually inappropriate behavior?  no        Mental Status Exam   Affect: Constricted  Appearance: Appropriate  Attention Span/Concentration: Attentive, Inattentive  Eye Contact: Variable    Fund of Knowledge: Appropriate   Language /Speech Content: Fluent  Language /Speech Volume: Normal  Language /Speech Rate/Productions: Minimally Responsive, Normal  Recent Memory: Variable  Remote Memory: Variable  Mood: Anxious, Irritable  Orientation to Person: Yes   Orientation to Place: Yes  Orientation to Time of Day: Yes  Orientation to Date: Yes     Situation (Do they understand why they are here?): Yes  Psychomotor Behavior: Hyperactive, Agitated  Thought Content: Hallucinations, Paranoia  Thought Form: Paranoia     Medication  Psychotropic medications:   Medication Orders -  Psychiatric (From admission, onward)      Start     Dose/Rate Route Frequency Ordered Stop    10/30/23 0054  nicotine (NICORETTE) gum 2 mg        Note to Pharmacy: DO NOT USE THIS FIELD FOR ADMIN INSTRUCTIONS; INFORMATION DOES NOT SHOW ON MAR. USE THE FIELD ABOVE MARKED ADMIN INSTRUCTIONS    2 mg Buccal EVERY 1 HOUR PRN 10/30/23 0055               Current Care Team  Patient Care Team:  Becki Avery APRN CNP as PCP - General (Family Medicine)  Kathie Sandhu MD as MD (Neurology)  Tessa Galvan, RN as Specialty Care Coordinator  Ashu Russell DO as MD (Psychiatry & Neurology - Neurology)  Becki Avery APRN CNP as Assigned PCP  Desmond West as Specialty Care Coordinator (Plastic Surgery)  Harvey Negron MD as MD (Psychiatry)  Dora Mazariegos, PhD (Student in organized health care education/training program)  Glenda Juan MD as Resident (Family Medicine)  Ayana  as   Regine Last APRN CNP as Nurse Practitioner (Psychiatry)  Oswaldo Amado MD as MD (Dermatology)  Regine Last APRN CNP as Assigned Behavioral Health Provider  Mark Cleary MD as MD (Dermatology)  Lucie Chan, RN as Specialty Care Coordinator (Psychiatry)  Ashu Russell DO as MD (Neurology)  Luz Moncada APRN CNP as Assigned Neuroscience Provider  Alice Tubbs [facility] as Pharmacist (Pharmacist)  Mark Cleary MD as Assigned Surgical Provider  Moriah Rojas [facility] as Pharmacist (Pharmacist)  Moriah Rojas [facility] as Assigned MTM Pharmacist  Leventhal, Thomas Michael, MD as MD (Gastroenterology)  Mark Cleary MD as MD (Dermatology)  Patient did not agree to sign ROIs      Diagnosis  Patient Active Problem List   Diagnosis Code    ADHD (attention deficit hyperactivity disorder) F90.9    Bipolar 1 disorder, manic, mild F31.11    Marijuana abuse F12.10    Polysubstance abuse (H) F19.10     GERD (gastroesophageal reflux disease) K21.9    Tobacco abuse Z72.0    Intractable back pain M54.9    Optic neuritis H46.9    Cauda equina syndrome with neurogenic bladder (H) G83.4    Schizoaffective disorder, bipolar type (H) F25.0    PTSD (post-traumatic stress disorder) F43.10    Anxiety F41.9    Auditory hallucination R44.0    Nephrolithiasis N20.0    Cyst of left ovary N83.202    Borderline personality disorder (H) F60.3    Cannabis use disorder, severe, dependence (H) F12.20    Depression F32.A    Episodic mood disorder (H24) F39    History of heroin abuse (H) F11.11    Moderate episode of recurrent major depressive disorder (H) F33.1    Opioid use disorder, severe, dependence (H) F11.20    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    Nausea R11.0    Overdose T50.901A    Bella (H) F30.9    Urinary retention R33.9    Chronic bilateral low back pain without sciatica M54.50, G89.29    AVA (generalized anxiety disorder) F41.1    Aggressive behavior R46.89    Gender identity disorder F64.9    Lumbosacral radiculopathy at L5 M54.17    DUB (dysfunctional uterine bleeding) N93.8    Seizure-like activity (H) R56.9    Acanthosis nigricans L83    Schizoaffective disorder, chronic condition with acute exacerbation (H) F25.9    Bipolar affective disorder, mixed, severe, with psychotic behavior (H) F31.64    Schizophrenia, schizoaffective, chronic with acute exacerbation (H) F25.9    Akathisia G25.71    Hypertension, unspecified type I10    Female-to-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 person Z78.9    Morbid obesity (H) E66.01    Diabetes mellitus, type 2 (H) E11.9    Suicidal ideation R45.851    Mood disorder due to a general medical condition F06.30       Primary Problem This Admission  Active Hospital Problems    Anxiety      *Schizoaffective disorder, bipolar type (H)        Clinical Summary and Substantiation of Recommendations   After therapeutic assessment, intervention and aftercare planning by ED care team  and [facility] and in consultation with attending provider, the patient's circumstances and mental state were appropriate for outpatient management. It is the recommendation of this clinician that pt discharge with OP MH support. A this time the pt is not presenting as an acute risk to self or others due to the following factors: Patient denied SI, NSSI, and HI.  He had mild AH and paranoia. Patient would like to return to group home and to follow up with outpatient providers.    Patient coping skills attempted to reduce the crisis:  Medications and pacing.    Disposition  Recommended disposition: Individual Therapy, Medication Management, Group Home        Reviewed case and recommendations with attending provider. Attending Name: Ana Luisa Alaniz MD       Attending concurs with disposition: yes       Patient and/or validated legal guardian concurs with disposition:   yes       Final disposition:  discharge    Legal status on admission: Voluntary/Patient has signed consent for treatment    Assessment Details   Total duration spent with the patient: 30 min     CPT code(s) utilized: 89972 - Psychotherapy for Crisis - 60 (30-74*) min    Jeniffer Webber [facility], Psychotherapist  DEC - Triage & Transition Services  Callback: [phone]    Aftercare Plan  Follow up with psychiatry and  on Tuesday 10/31/23.     to continue referrals to therapy.          If I am feeling unsafe or I am in a crisis, I will:   Contact my established care providers   Call the National Suicide Prevention Lifeline: 605.487.7106   Go to the nearest emergency room   Call [phone]      Warning signs that I or other people might notice when a crisis is developing for me:     Increase in feelings of paranoia.    Increase in feelings of anxiety  Not taking care of self  Not sleeping   I am having increasing suicidal thoughts that turn to plans with intent or means  I am having additional urges to self-harm    My emotions are of  hopelessness; feeling like there's no way out.  Rage or anger.  Engaging in risky activities without thinking  Withdrawing from family/friends  Dramatic mood swings  Drastic personality changes   Use of alcohol or drugs  Postings on social media  Neglect of personal hygiene or cares      Things I am able to do on my own to cope or help me feel better:    Spending quality time with loved ones  Staying hydrated  Eating balanced meals  Going for a walk every day  Take care of daily responsibilities/needs  Focus on positive self-talk vs negative self-talk     Things that I am able to do with others to cope or help me better:   Exercise  Music  Deep breathing  Meditations  Journal  Self-regulate  Self check-in  Ask for help     Things I can use or do for distraction:   Reach out to/spend time with family, friends  Shower  Exercise  Chores or do a project  Listen to music  Watch movie/TV  Listening to music  Journaling  Reading a book  Meditating  Call a friend     Changes I can make to support my mental health and wellness:    -I will abstain from all mood altering chemicals not currently prescribed to me   -I will attend scheduled mental health therapy and psychiatric appointments and follow all   recommendations  -I will commit to 30 minutes of self care daily - this can be as simple as taking a shower, going for a   walk, cooking a meal, read, writing, etc  -I will practice square breathing when I begin to feel anxious - in breath through the nose for the count   of 4 and the first line on the square. Out breath through the mouth for the count of 4 for the second line   of the square. Repeat to complete the square. Repeat the square as many times as needed.  - I will use distraction skills of: going for walks, watching TV, spending time outside, calling a friend or   family member  -Use community resources, including hotline numbers, [facility] crisis and support meetings  -Maintain a daily schedule/routine  -Practice  deep breathing skills  -Download a meditation hiren and spend 15-20 minutes per day mediating/relaxing. Some apps to   download include: Calm, Headspace and Insight Timer. All 3 of these apps have free version     Reduce Extreme Emotion  QUICKLY:  Changing Your Body Chemistry      T:  Change your body Temperature to change your autonomic nervous system   Use Ice Water to calm yourself down FAST   Put your face in a bowl of ice water (this is the best way; have the person keep his/her face in ice water for 30-45 seconds - initial research is showing that the longer s/he can hold her/his face in the water, the better the response), or   Splash ice water on your face, or hold an ice pack on your face      I:  Intensely exercise to calm down a body revved up by emotion   Examples: running, walking fast, jumping, playing basketball, weight lifting, swimming, calisthenics, etc.   Engage in exercises that DO NOT include violent behaviors. Exercises that utilize violent behaviors tend to function as  behavioral rehearsal,  and rather than calming the person down, may actually  rev  the person up more, increasing the likelihood of violence, and lessening the likelihood that they will  burn off  energy     P:  Progressively relax your muscles   Starting with your hands, moving to your forearms, upper arms, shoulders, neck, forehead, eyes, cheeks and lips, tongue and teeth, chest, upper back, stomach, buttocks, thighs, calves, ankles, feet   Tense (10 seconds,   of the way), then relax each muscle (all the way)   Notice the tension   Notice the difference when relaxed (by tensing first, and then relaxing, you are able to get a more thorough relaxation than by simply relaxing)      P: Paced breathing to relax   The standard technique is to begin with counting the number of steps one takes for a typical inhale, then counting the steps one takes for a typical exhale, and then lengthening the amount of steps for the exhalation by  "one or two steps.  OR  Repeat this pattern for 1-2 minutes  Inhale for four (4) seconds   Exhale for six (6) to eight (8) seconds   Research demonstrated that one can change one's overall level of anxiety by doing this exercise for even a few minutes per day       People in my life that I can ask for help:   Family  Friends  Providers     Your [facility] has a mental health crisis team you can call 24/7:   [facility] Crisis Line Number: [phone]     Other things that are important when I'm in crisis:  Ask for help  Try to eat, drink fluids, and sleep first to assist with de-escalation.     Additional resources and information:      Mental Health Apps  My3  https://ClickFacts.Mobio/     VirtualHopeBox  https://Asia Translate/apps/virtual-hope-box/        Professionals or Agencies I Can Contact During A Crisis:        Crisis Lines  Call or Text 400 - National Suicide and Crisis Lifeline     Crisis Text Line  Text 925183  You will be connected with a trained live crisis counselor to provide support.     The Manuel Project (LGBTQ Youth Crisis Line)  3.382.904.3245  text START to 651-083     National [place] on Mental Illness (GILMA)  334.515.6267 or 5.185.GILMA.HELPS     National Suicide Prevention Lifeline at 8-546-811-CBET (4960)      Throughout  [place]: call **CRISIS (**760210)      Crisis Text Line: is available for free, 24/7 by texting [place] to 657087     [employer] Resources  Fast Tracker  Linking people to mental health and substance use disorder resources  NabsystrackKrowdPadn.org      [place] Mental Health Warm Line  Peer to peer support  Monday thru Saturday, 12 pm to 10 pm  272.957.0498 or 9.194.235.3492  Text \"Support\" to 07658     National [place] on Mental Illness (www.[place].gilma.org): 988.301.6231 or 890-107-0591     [facility] Phone (free remote counseling): 285.421.3035 Web address:   https://Picaboo.org/      iKaaz[employer] (filter for insurance, gender preference, etc.)   "   [facility]   [phone]  Intake appointment will be virtual, following appointments can be in person or virtual.   **IMMEDIATE OPENINGS**     [facility]  [phone]  *offers individual therapy, medication management and Mental Health Case Workers; can self refer     [facility]  [phone]  *Immediate Openings     [facility]  [phone]  *Immediate Openings     [facility]  [phone]  *Immediate Openings     Please follow up with scheduled providers to ensure all necessary paperwork is filled out prior to your   scheduled telehealth appointments.      Coordinators from [facility] will be calling within two business days to ensure   that you have the resources you may need or provide assistance with scheduling (Phone number: [phone].).     Remember: give the referrals 3 sessions prior to calling it quits. Do you trust them? Do you feel   understood? Do you think they can help? Check in with yourself after each session     Please reach out to the [facility]([phone]) regarding further mental health appointment needs for this emergency department visit.     [facility] SCHEDULING:  Today you were seen by a licensed mental health professional through [facility] ([facility])  for a crisis assessment in the Emergency Department at [facility].  It is recommended that you follow up with your estabished providers (psychiatrist, menta health therapist, and/or primary care doctor - as relevant) as soon as possible. Coordinators from [facility] will be calling you in the next 24-48 hours to ensure that you have the resources you need.  You can so contact [facility] coordinators directly at [phone].     [facility] maintains an extensive network of licensed behavioral health providers to connect patients with the services they need.  We do not charge providers a  fee to participate in our referral network.  We match patients with providers based on a patient s specific needs, insurance coverage, and location.  Our first effort will be to refer you to a provider within your care system, and will utilize providers outside your care system as needed.

## 2023-10-30 NOTE — ED NOTES
SIGN-OUT:  - Assumed care of this patient from Dr. Dr. Godfrey.  Patient was seen and evaluated for anxiety.  Had a mental health assessment.  Has psych follow-up on Tuesday.  Plan was for discharge back to the group home but they were on able to get a hold of group home staff.  Plan is that patient will discharge back to the group home once we are able to get a hold of staff.  - Pending at shift change: Awaiting to get a hold of group home prior to discharge  - Tentative plan from original EM Physician: Nothing to do, waiting to get a hold of group home prior to discharge.  Group home is willing to take patient back.  They are requesting a as needed medication, will give 3 tablets of Zyprexa that can be used as needed severe anxiety until his psychiatrist can make further adjustments to his medications.    MD Gurpreet Wesley, Damon Machado MD  10/30/23 7661

## 2023-10-30 NOTE — ED NOTES
Bed: [facility]  Expected date: 10/29/23  Expected time:   Means of arrival:   Comments:  N411- 33M, anxiety

## 2023-10-31 ENCOUNTER — OFFICE VISIT (OUTPATIENT)
Dept: PSYCHIATRY | Facility: CLINIC | Age: 33
End: 2023-10-31
Attending: NURSE PRACTITIONER
Payer: MEDICARE

## 2023-10-31 ENCOUNTER — TELEPHONE (OUTPATIENT)
Dept: BEHAVIORAL HEALTH | Facility: CLINIC | Age: 33
End: 2023-10-31

## 2023-10-31 VITALS — HEART RATE: 116 BPM | SYSTOLIC BLOOD PRESSURE: 149 MMHG | DIASTOLIC BLOOD PRESSURE: 94 MMHG

## 2023-10-31 DIAGNOSIS — F41.9 ANXIETY: ICD-10-CM

## 2023-10-31 DIAGNOSIS — F25.0 SCHIZOAFFECTIVE DISORDER, BIPOLAR TYPE (H): Primary | ICD-10-CM

## 2023-10-31 PROCEDURE — 99215 OFFICE O/P EST HI 40 MIN: CPT | Performed by: NURSE PRACTITIONER

## 2023-10-31 PROCEDURE — 99417 PROLNG OP E/M EACH 15 MIN: CPT | Performed by: NURSE PRACTITIONER

## 2023-10-31 PROCEDURE — G0463 HOSPITAL OUTPT CLINIC VISIT: HCPCS | Performed by: NURSE PRACTITIONER

## 2023-10-31 RX ORDER — QUETIAPINE FUMARATE 300 MG/1
300 TABLET, FILM COATED ORAL AT BEDTIME
Qty: 30 TABLET | Refills: 1 | Status: ON HOLD | OUTPATIENT
Start: 2023-10-31 | End: 2023-11-27

## 2023-10-31 RX ORDER — LORAZEPAM 1 MG/1
1 TABLET ORAL DAILY PRN
Qty: 30 TABLET | Refills: 1 | Status: ON HOLD | OUTPATIENT
Start: 2023-10-31 | End: 2023-11-27

## 2023-10-31 RX ORDER — QUETIAPINE FUMARATE 100 MG/1
100 TABLET, FILM COATED ORAL EVERY MORNING
Qty: 30 TABLET | Refills: 1 | Status: ON HOLD | OUTPATIENT
Start: 2023-10-31 | End: 2023-11-27
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
## 2023-10-31 NOTE — TELEPHONE ENCOUNTER
R:    Intake notes [facility] Order for Emergency Return to Facility received in [employer] on 10/31/2023 at 11:51:12 AM, placed in Folder labeled Court/Commitment/[place]/Hold.

## 2023-10-31 NOTE — Clinical Note
I am not sure if you will receive this twice. But I saw this patient today who is seeing you on 11/7. Pt again stopped all the medications (medical and psych). Pt discussed his frustration of not getting top surgery due to his instability. But pt agreed that he had 6 months stability late last year to early this year until he stopped his lithium and not adhering lithium lab. We discussed in detail that I will support his wish to go through top surgery when he is more stable.  Stability is not just for psych stability, but BG and BP control will be important for his recovery. He agreed to restart the medications today. There has been some suspicions of substance use, but most recent UTOX at ED were all negative except medical cannabis and benzodiazapine which was given earlier though CM noted pt reported daily methamphetamine use immediately after last hospitalization and recommending MICD treatment.  Thank you mattie

## 2023-10-31 NOTE — PATIENT INSTRUCTIONS
-Take Seroquel 100 mg in AM and 300 at bedtime for mood, anxiety and agitation. Monitor for oversedation.  -May use Ativan 1 mg daily PRN for Ativan. Monitor sedation.    Your next appointment is scheduled on 11/7/2023 (Tue) at 1pm in person.    Thank you for coming to the [facility] [place] [facility].    Lab Testing:  If you had lab testing today and your results are reassuring or normal they will be mailed to you or sent through [facility] within 7 days. If the lab tests need quick action we will call you with the results. The phone number we will call with results is # [phone] (home) . If this is not the best number please call our clinic and change the number.    Medication Refills:  If you need any refills please call your pharmacy and they will contact us. Our fax number for refills is [phone]. Please allow three business for refill processing. If you need to  your refill at a new pharmacy, please contact the new pharmacy directly. The new pharmacy will help you get your medications transferred.     Scheduling:  If you have any concerns about today's visit or wish to schedule another appointment please call our office during normal business hours [phone] (8-5:00 M-F)    Contact Us:  Please call [phone] during business hours (8-5:00 M-F).  If after clinic hours, or on the weekend, please call  [phone].    Financial Assistance [phone]  [facility] Billing [phone]  Central Billing Office, [facility]: [phone]  [facility] Billing [phone]  Medical Records [phone]      MENTAL HEALTH CRISIS NUMBERS:  For a medical emergency please call  911 or go to the nearest ER.     [facility]:   [facility] -[phone]   Crisis Residence [facility] -[phone]   Walk-In Counseling Center [facility] -[phone]   COPE 24/7 [place] Mobile Team -[phone] (adults)/[phone] (child)  CHILD: [facility] needs  assessment team - [phone]      [facility]:   [facility] - [phone]   Walk-in counseling [facility] - [phone]   Walk-in counseling [facility] - [phone]   Crisis Residence [facility] - [phone]  Urgent Care Adult Mental Jlhwlp-[phone] mobile unit/ 24/7 crisis line    National Crisis Numbers:   National Suicide Prevention Lifeline: 7-384-243-TALK (260-862-9846)  Poison Control Center - 5-465-377-3003  [place] [employer]/resources for a list of additional resources (SOS)  Trans Lifeline a hotline for transgender people 2-862-434-1818  The [employer] a hotline for LGBT youth 4-933-530-9777  Crisis Text Line: For any crisis 24/7   To: 947130  see www.crisistextline.org  - IF MAKING A CALL FEELS TOO HARD, send a text!         Again thank you for choosing [facility] MENTAL HEALTH & ADDICTION [facility] and please let us know how we can best partner with you to improve you and your family's health.    You may be receiving a survey regarding this appointment. We would love to have your feedback, both positive and negative. The survey is done by an external company, so your answers are anonymous.

## 2023-10-31 NOTE — Clinical Note
"Pt has an appt with you on 11/7.  Pt noted he is frustrated that his top surgery has been postponed multiple times due to his instability.  But pt also acknowledged today that he had stability over 6 months late last year to early this year when he was taking the medication. Pt recently stopped all the medications. I discussed with him in detail that I support his wish to have top surgery and when he is more stable, will support his decision. But he stops his internal medicine medications when he just \"pissed off\" which also makes it difficult for medical stability to have surgery.  He is aware of this and agreed to restart medications today.  Thank you mattie"

## 2023-10-31 NOTE — LETTER
2023    Re: Prakash Prasad (: 1990)    To Whom It May Concern,    Prakash Prasad is currently under my care. I am familiar with his history and the functional limitations imposed by his diagnosis. The patient meets the definition of disability under the Americans with Disabilities Act, the Fair Housing Act, and the Rehabilitation Act of .    Due to this emotional/mental disability, Prakash has certain limitations related to anxiety, agitation and mood regulation. In order to help alleviate these difficulties, and to enhance the ability to live independently and to fully use and enjoy the housing unit where they live, I have prescribed Prakash the use of an Emotional Support Animal (HANNAH) and this should be accommodated to the extent legally allowed.    There is a significant evidence base for the therapeutic benefits of ESAs in the treatment of certain mental health conditions, such as that for which Prakash is currently being treated.     This letter is in support of the use of an HANNAH as an appropriate and evidence based medical intervention. The training, certification, and veterinary care of the animal is the responsibility of the animal's owner, who agrees to appropriate care and responsibility for the animal and its behaviors.         Sincerely,     SONY CalvinP

## 2023-10-31 NOTE — NURSING NOTE
Chief Complaint   Patient presents with    Recheck Medication     Anxiety     - Matty Sands, Visit Facilitator

## 2023-11-01 ENCOUNTER — TELEPHONE (OUTPATIENT)
Dept: DERMATOLOGY | Facility: CLINIC | Age: 33
End: 2023-11-01
Payer: MEDICARE

## 2023-11-01 NOTE — TELEPHONE ENCOUNTER
[facility] Call Center    Phone Message    May a detailed message be left on voicemail: yes     Reason for Call: Patient calling to schedule appt with Dr Cleary in  in December - writer unable to schedule - please call back [phone] Thank you    Action Taken: Message routed to:  Adult Clinics: Dermatology p [id number]    Travel Screening: Not Applicable

## 2023-11-02 ENCOUNTER — TELEPHONE (OUTPATIENT)
Dept: PSYCHIATRY | Facility: CLINIC | Age: 33
End: 2023-11-02

## 2023-11-02 ENCOUNTER — HOSPITAL ENCOUNTER (INPATIENT)
Facility: CLINIC | Age: 33
LOS: 22 days | Discharge: NURSING, REHAB OR LONG-TERM CARE | DRG: 885 | End: 2023-11-29
Attending: EMERGENCY MEDICINE | Admitting: PSYCHIATRY & NEUROLOGY
Payer: MEDICARE

## 2023-11-02 DIAGNOSIS — M54.42 CHRONIC LEFT-SIDED LOW BACK PAIN WITH LEFT-SIDED SCIATICA: ICD-10-CM

## 2023-11-02 DIAGNOSIS — F25.9 SCHIZOAFFECTIVE DISORDER, CHRONIC CONDITION WITH ACUTE EXACERBATION (H): ICD-10-CM

## 2023-11-02 DIAGNOSIS — F41.1 GENERALIZED ANXIETY DISORDER: ICD-10-CM

## 2023-11-02 DIAGNOSIS — L70.0 ACNE VULGARIS: Chronic | ICD-10-CM

## 2023-11-02 DIAGNOSIS — G89.29 CHRONIC LEFT-SIDED LOW BACK PAIN WITH LEFT-SIDED SCIATICA: ICD-10-CM

## 2023-11-02 DIAGNOSIS — F60.3 BORDERLINE PERSONALITY DISORDER (H): ICD-10-CM

## 2023-11-02 DIAGNOSIS — F41.1 GAD (GENERALIZED ANXIETY DISORDER): ICD-10-CM

## 2023-11-02 DIAGNOSIS — F25.0 SCHIZOAFFECTIVE DISORDER, BIPOLAR TYPE (H): ICD-10-CM

## 2023-11-02 DIAGNOSIS — Z78.9 FEMALE-TO-MALE TRANSGENDER PERSON: ICD-10-CM

## 2023-11-02 DIAGNOSIS — F25.9 SCHIZOPHRENIA, SCHIZOAFFECTIVE, CHRONIC WITH ACUTE EXACERBATION (H): ICD-10-CM

## 2023-11-02 DIAGNOSIS — F31.64 BIPOLAR AFFECTIVE DISORDER, MIXED, SEVERE, WITH PSYCHOTIC BEHAVIOR (H): ICD-10-CM

## 2023-11-02 DIAGNOSIS — F41.9 ANXIETY: ICD-10-CM

## 2023-11-02 DIAGNOSIS — F90.9 ATTENTION DEFICIT HYPERACTIVITY DISORDER (ADHD), UNSPECIFIED ADHD TYPE: ICD-10-CM

## 2023-11-02 DIAGNOSIS — R45.1 AGITATION: Primary | ICD-10-CM

## 2023-11-02 DIAGNOSIS — M79.641 PAIN OF RIGHT HAND: ICD-10-CM

## 2023-11-02 DIAGNOSIS — K21.9 GASTROESOPHAGEAL REFLUX DISEASE WITHOUT ESOPHAGITIS: ICD-10-CM

## 2023-11-02 DIAGNOSIS — M51.369 DDD (DEGENERATIVE DISC DISEASE), LUMBAR: ICD-10-CM

## 2023-11-02 PROCEDURE — 99285 EMERGENCY DEPT VISIT HI MDM: CPT | Mod: 25 | Performed by: EMERGENCY MEDICINE

## 2023-11-02 PROCEDURE — 96372 THER/PROPH/DIAG INJ SC/IM: CPT | Performed by: EMERGENCY MEDICINE

## 2023-11-02 PROCEDURE — 99285 EMERGENCY DEPT VISIT HI MDM: CPT | Performed by: EMERGENCY MEDICINE

## 2023-11-02 PROCEDURE — 250N000011 HC RX IP 250 OP 636: Mod: JZ | Performed by: EMERGENCY MEDICINE

## 2023-11-02 RX ORDER — OLANZAPINE 10 MG/2ML
5 INJECTION, POWDER, FOR SOLUTION INTRAMUSCULAR ONCE
Status: COMPLETED | OUTPATIENT
Start: 2023-11-02 | End: 2023-11-02

## 2023-11-02 RX ADMIN — OLANZAPINE 5 MG: 10 INJECTION, POWDER, FOR SOLUTION INTRAMUSCULAR at 22:10

## 2023-11-02 RX ADMIN — OLANZAPINE 5 MG: 10 INJECTION, POWDER, FOR SOLUTION INTRAMUSCULAR at 23:27

## 2023-11-02 ASSESSMENT — ACTIVITIES OF DAILY LIVING (ADL): ADLS_ACUITY_SCORE: 37

## 2023-11-02 NOTE — TELEPHONE ENCOUNTER
Patient called and states that he took his PRM of Ativan today and about 2 hours later was very irritable and unable to focus. He is hoping to try something else. He would like either Klonopin or Xanax.

## 2023-11-02 NOTE — TELEPHONE ENCOUNTER
I left a VM for patient to call the clinic to reschedule their December acne appointment with Dr. Cleary.     If patient returns call please ask if they would prefer an in person or telephone visit.     Telephone 12/18 at 7:45 am (DB)  In person 12/18 at 9:30 am (DB) OR 12/11 at 2:30 (DB)    These dates/times OK'd per Isabel.     Loree ESPINO

## 2023-11-02 NOTE — TELEPHONE ENCOUNTER
[facility] Call Center    Phone Message    May a detailed message be left on voicemail: yes     Reason for Call: Symptoms or Concerns     If patient has red-flag symptoms, warm transfer to triage line    Current symptom or concern: Getting very irritable and unable to focus - Medication: Ativan    Symptoms have been present for: just started happening.    Has patient previously been seen for this? N/A    Are there any new or worsening symptoms? No    Action Taken: Message routed to:  Other: psychiatry nurse ump    Travel Screening: Not Applicable

## 2023-11-02 NOTE — TELEPHONE ENCOUNTER
Per provider patient can bring in Ativan to be destroyed and they will order Klonopin. Patient is willing to do this and will come tomorrow before noon to bring in medication.

## 2023-11-03 ENCOUNTER — TELEPHONE (OUTPATIENT)
Dept: PSYCHIATRY | Facility: CLINIC | Age: 33
End: 2023-11-03
Payer: MEDICARE

## 2023-11-03 ENCOUNTER — APPOINTMENT (OUTPATIENT)
Dept: GENERAL RADIOLOGY | Facility: CLINIC | Age: 33
DRG: 885 | End: 2023-11-03
Attending: EMERGENCY MEDICINE
Payer: MEDICARE

## 2023-11-03 ENCOUNTER — TELEPHONE (OUTPATIENT)
Dept: BEHAVIORAL HEALTH | Facility: CLINIC | Age: 33
End: 2023-11-03
Payer: MEDICARE

## 2023-11-03 LAB — GLUCOSE BLDC GLUCOMTR-MCNC: 145 MG/DL (ref 70–99)

## 2023-11-03 PROCEDURE — 73130 X-RAY EXAM OF HAND: CPT | Mod: RT

## 2023-11-03 PROCEDURE — 250N000011 HC RX IP 250 OP 636: Mod: JZ | Performed by: EMERGENCY MEDICINE

## 2023-11-03 PROCEDURE — 250N000011 HC RX IP 250 OP 636: Performed by: EMERGENCY MEDICINE

## 2023-11-03 PROCEDURE — 250N000013 HC RX MED GY IP 250 OP 250 PS 637: Performed by: EMERGENCY MEDICINE

## 2023-11-03 PROCEDURE — 82962 GLUCOSE BLOOD TEST: CPT

## 2023-11-03 PROCEDURE — 96372 THER/PROPH/DIAG INJ SC/IM: CPT | Performed by: EMERGENCY MEDICINE

## 2023-11-03 RX ORDER — LORAZEPAM 1 MG/1
1 TABLET ORAL ONCE
Status: COMPLETED | OUTPATIENT
Start: 2023-11-03 | End: 2023-11-03

## 2023-11-03 RX ORDER — ACETAMINOPHEN 500 MG
1000 TABLET ORAL ONCE
Status: COMPLETED | OUTPATIENT
Start: 2023-11-03 | End: 2023-11-03

## 2023-11-03 RX ORDER — OLANZAPINE 10 MG/2ML
5 INJECTION, POWDER, FOR SOLUTION INTRAMUSCULAR ONCE
Status: COMPLETED | OUTPATIENT
Start: 2023-11-03 | End: 2023-11-03

## 2023-11-03 RX ORDER — LORAZEPAM 1 MG/1
1 TABLET ORAL DAILY PRN
Status: DISCONTINUED | OUTPATIENT
Start: 2023-11-03 | End: 2023-11-06

## 2023-11-03 RX ORDER — TRETINOIN 0.05 G/100G
GEL TOPICAL AT BEDTIME
Status: ON HOLD | COMMUNITY
End: 2023-11-27

## 2023-11-03 RX ORDER — LITHIUM CARBONATE 450 MG
900 TABLET, EXTENDED RELEASE ORAL AT BEDTIME
Status: DISCONTINUED | OUTPATIENT
Start: 2023-11-03 | End: 2023-11-29 | Stop reason: HOSPADM

## 2023-11-03 RX ORDER — QUETIAPINE FUMARATE 100 MG/1
100 TABLET, FILM COATED ORAL EVERY MORNING
Status: DISCONTINUED | OUTPATIENT
Start: 2023-11-04 | End: 2023-11-06

## 2023-11-03 RX ORDER — AMLODIPINE BESYLATE 5 MG/1
10 TABLET ORAL DAILY
Status: DISCONTINUED | OUTPATIENT
Start: 2023-11-03 | End: 2023-11-29 | Stop reason: HOSPADM

## 2023-11-03 RX ORDER — HYDROXYZINE HYDROCHLORIDE 50 MG/1
50 TABLET, FILM COATED ORAL ONCE
Status: COMPLETED | OUTPATIENT
Start: 2023-11-03 | End: 2023-11-03

## 2023-11-03 RX ORDER — KETOROLAC TROMETHAMINE 30 MG/ML
30 INJECTION, SOLUTION INTRAMUSCULAR; INTRAVENOUS ONCE
Status: DISCONTINUED | OUTPATIENT
Start: 2023-11-03 | End: 2023-11-03

## 2023-11-03 RX ORDER — OLANZAPINE 10 MG/1
10 TABLET, ORALLY DISINTEGRATING ORAL ONCE
Status: COMPLETED | OUTPATIENT
Start: 2023-11-03 | End: 2023-11-03

## 2023-11-03 RX ORDER — KETOROLAC TROMETHAMINE 30 MG/ML
30 INJECTION, SOLUTION INTRAMUSCULAR; INTRAVENOUS ONCE
Status: COMPLETED | OUTPATIENT
Start: 2023-11-03 | End: 2023-11-03

## 2023-11-03 RX ORDER — CHLORAL HYDRATE 500 MG
1 CAPSULE ORAL DAILY
COMMUNITY
End: 2024-04-18

## 2023-11-03 RX ORDER — ONDANSETRON 8 MG/1
8 TABLET, ORALLY DISINTEGRATING ORAL ONCE
Status: COMPLETED | OUTPATIENT
Start: 2023-11-03 | End: 2023-11-03

## 2023-11-03 RX ORDER — QUETIAPINE FUMARATE 100 MG/1
300 TABLET, FILM COATED ORAL AT BEDTIME
Status: DISCONTINUED | OUTPATIENT
Start: 2023-11-03 | End: 2023-11-06

## 2023-11-03 RX ADMIN — LORAZEPAM 1 MG: 1 TABLET ORAL at 17:01

## 2023-11-03 RX ADMIN — NICOTINE POLACRILEX 4 MG: 4 GUM, CHEWING BUCCAL at 00:27

## 2023-11-03 RX ADMIN — AMLODIPINE BESYLATE 10 MG: 5 TABLET ORAL at 12:30

## 2023-11-03 RX ADMIN — NICOTINE POLACRILEX 4 MG: 4 GUM, CHEWING BUCCAL at 12:17

## 2023-11-03 RX ADMIN — OLANZAPINE 5 MG: 10 INJECTION, POWDER, FOR SOLUTION INTRAMUSCULAR at 01:18

## 2023-11-03 RX ADMIN — NICOTINE POLACRILEX 4 MG: 4 GUM, CHEWING BUCCAL at 16:46

## 2023-11-03 RX ADMIN — NICOTINE POLACRILEX 4 MG: 4 GUM, CHEWING BUCCAL at 14:00

## 2023-11-03 RX ADMIN — Medication 5 MG: at 01:18

## 2023-11-03 RX ADMIN — ACETAMINOPHEN 1000 MG: 500 TABLET ORAL at 00:27

## 2023-11-03 RX ADMIN — ONDANSETRON 8 MG: 8 TABLET, ORALLY DISINTEGRATING ORAL at 17:01

## 2023-11-03 RX ADMIN — OLANZAPINE 10 MG: 10 TABLET, ORALLY DISINTEGRATING ORAL at 14:30

## 2023-11-03 RX ADMIN — HYDROXYZINE HYDROCHLORIDE 50 MG: 50 TABLET, FILM COATED ORAL at 17:01

## 2023-11-03 RX ADMIN — LORAZEPAM 1 MG: 1 TABLET ORAL at 12:30

## 2023-11-03 RX ADMIN — KETOROLAC TROMETHAMINE 30 MG: 30 INJECTION, SOLUTION INTRAMUSCULAR at 00:35

## 2023-11-03 ASSESSMENT — ACTIVITIES OF DAILY LIVING (ADL)
ADLS_ACUITY_SCORE: 37

## 2023-11-03 NOTE — ED NOTES
was called to get more information on how much ativan the pt ingested and if they witnessed it.  home staff stated it wasn't witnessed but pt told staff it was a full bottle, of 30 pills (1 mg/each). Staff told writer that pt self administers their own medication. Pt currently in seclusion due to threatening behaviors towards others.

## 2023-11-03 NOTE — ED TRIAGE NOTES
"Patient is experiencing haven symptoms. Patient reports he took his entire bottle of ativan - reports there were 30 pills in there. Patient reports they were 1 mg pills. Patient reports he also smoked some pot tonight. Patient denies SI, states he took the Ativan because he was anxious. Patient reports he also took some \"grab bowl\" pills at a party. Patient reports he skipped his night meds. Patient is pacing in triage. Patient reports he had been clean off drugs for a year but is no longer due to the pot, ativan, and grab bowl. Patient reports he was just prescribed the ativan yesterday,         "

## 2023-11-03 NOTE — TELEPHONE ENCOUNTER
S: SANDRA HILL  Ayana  calling at 1:16 PM  about a 33 year old/Male presenting with Anxiety and AH.     B: Pt arrived via EMS. Presenting problem, stressors: Came in from  with increasing AH, erratic behavior and anxiety. Per collateral (med provider) pt has hx of med noncompliance and believe he has not been non-compliant.    Pt affect in ED:  Agitated and needed Zyprexa to calm down and attempted to elope  Pt Dx: Generalized Anxiety Disorder, Schizoaffective Disorder, PTSD, Borderline Personality Disorder, ADHD, and Substance Use Disorder: Polysubstance   Previous [facility] hx? Yes: most recent was 8/10-8/28/2023  Pt denies SI   Hx of suicide attempt? Pt states he had 1 SA in 2019 via overdose  Pt denies SIB  Pt denies HI   Pt endorses auditory hallucinations .   Pt RARS Score: 6    Hx of aggression/violence, sexual offenses, legal concerns, Epic care plan? describe: No to all   Current concerns for aggression this visit? Yes: When he came in last night displayed aggression by hitting a wall  Does pt have a history of Civil Commitment? Yes, most recent commitment Current Provisional discharge  Is Pt their own guardian? Yes    Pt is prescribed medication. Is patient medication compliant? No  Pt endorses OP services: Medication Management  CD concerns: Pt is in recovery with a hx of thc, methamphetamines and opioids use   Acute or chronic medical concerns: Pt states type 1 diabetes  Does Pt present with specific needs, assistive devices, or exclusionary criteria? None      Pt is ambulatory  Pt is able to perform ADLs independently      A: Pt to be reviewed for [facility] admission.  PD being revoked  Preferred placement: Statewide    COVID Symptoms: No  If yes, COVID test required   Utox: Not ordered, intake to request lab    CMP: Not ordered, intake requested lab  CBC: Not ordered, intake requested lab  HCG: Not ordered, intake to request lab     R: Patient cleared and ready for behavioral bed placement: Yes  Pt  placed on Critical access hospital worklist? Yes    Does Patient need a Transfer Center request created? No, Pt is located within [facility] ED, [facility] ED, or [place] ED

## 2023-11-03 NOTE — PLAN OF CARE
Ayana KAUR Shanice  November 3, 2023  Plan of Care Hand-off Note     Patient Care Path: inpatient mental health    Plan for Care:   The pt has a diagnosis history significant for schizoaffective disorder- bipolar type, borderline personality disorder, AVA, ADHD, PTSD and polysubstance abuse (THC, methamphetamine, opioids). Pt is under MI commitment through [facility], provisional discharge was revoked on 10/31, due to medication noncompliance and increased psychosis symptoms. The pt was transported to the ED from his group home due to increasing paranoia, auditory hallucinations, and manic behavior. The pt is followed by [facility], his medication provider is Regine Last CNP. Per collateral from Lucie at the clinic, the pt is not medication compliant, and the group home where the pt resides does not administer resident's medications. The pt has been steadily decompensating, the group home does not feel they can keep the pt safe. After therapeutic assessment and intervention by the ED care team and LM, in consultation with attending provider, the pt's decompensating mental health make inpatient mental health admission necessary to stabilize the pt and address medication concerns.    Identified Goals and Safety Issues:  Pt agitated and needed a code 21 called after admission to ED       Legal Status:  [place] civil commitment provisional discharge revoked on 10/31/23.    Psychiatry Consult:No. Pt admitted for        Updated Dr. Medina regarding plan of care.           KENTRELL BENITEZ

## 2023-11-03 NOTE — ED NOTES
Called to room by camera room because pt. had fallen.  Found pt. laying on floor.  Pt. denies injuries,  assisted to feet and back to bed. Dr. Godfrey notified and went in to assess pt.  Fall was not witnessed by writer, but pt. is on 1:1 continuous.

## 2023-11-03 NOTE — ED PROVIDER NOTES
"    [facility] EMERGENCY DEPARTMENT ([facility])    11/02/23      ED PROVIDER NOTE      History     Chief Complaint   Patient presents with    Manic Behavior     Patient pacing nonstop. Patient reports taking all he had left of his ativan. Patient also reports skipping his night meds      HPI  Ayana \"Randall\" VINCENT Prasad is a 33 year old adult (female to 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; he/him/his) with a past medical history significant for bipolar affective disorder, schizophrenia with manic features, borderline personality disorder, episodic mood disorder, MDD with history of SI, polysubstance abuse (heroin, methamphetamine, marijuana), TBI, cauda equina syndrome with neurogenic bladder, nephrolithiasis, DM2, and HTN who presents to the ED from his group home with manic behavior.  Patient reports taking his entire bottle of Ativan, about 30 (1 mg) pills.  He had just been prescribed this Ativan yesterday.  Patient states that he did not do this as a self-harm attempt, but because he had been anxious. He also reports smoking marijuana, as well as taking \"some grab bowl pills\" while at a party.  Notes that he had skipped his night medications.  He now presents pacing nonstop.    Past Medical History  Past Medical History:   Diagnosis Date    ADHD (attention deficit hyperactivity disorder)     Bipolar 1 disorder     Borderline personality disorder     Cauda equina syndrome     Chronic low back pain     Depression     Diabetes mellitus, type 2 (H) 1/19/2023    GERD (gastroesophageal reflux disease)     h/o TBI (traumatic brain injury)     Hypertension, unspecified type 12/16/2021    Marginal corneal ulcer, left 07/17/2015    Nephrolithiasis     obesity     Polysubstance abuse - methamphetamine, hallucinagen, heroin, marijuana     currently in remission    PONV (postoperative nausea and vomiting)     PTSD (post-traumatic stress disorder)      Past Surgical History:   Procedure Laterality Date    BACK SURGERY  12/24/2016    BACK " SURGERY - For Cauda Equina Syndrome  12/24/2016    COLONOSCOPY      COMBINED CYSTOSCOPY, INSERT STENT URETER(S) Left 8/30/2018    Procedure: COMBINED CYSTOSCOPY, INSERT STENT URETER(S);  Cystoscopy With Left Stent Placement;  Surgeon: Kiran Ulrich MD;  Location: [facility] OR    COMBINED CYSTOSCOPY, RETROGRADES, EXCHANGE STENT URETER(S) Left 10/14/2018    Procedure: COMBINED CYSTOSCOPY, RETROGRADES, EXCHANGE STENT URETER(S);  Cystoscopy and removal of left-sided stent.;  Surgeon: Stiven Olivo MD;  Location:  OR    COMBINED CYSTOSCOPY, RETROGRADES, URETEROSCOPY, INSERT STENT  1/6/2014    Procedure: COMBINED CYSTOSCOPY, RETROGRADES, URETEROSCOPY, INSERT STENT;  Cystyoscopy place left ureteral stent;  Surgeon: Jaun Kimble MD;  Location: [facility] OR    COMBINED CYSTOSCOPY, RETROGRADES, URETEROSCOPY, INSERT STENT Left 10/23/2018    Procedure: Video cystoscopy, left ureteroscopy with stone extraction;  Surgeon: Bull Mast MD;  Location:  OR    CYSTOSCOPY, URETEROSCOPY, COMBINED Right 9/23/2015    Procedure: COMBINED CYSTOSCOPY, URETEROSCOPY;  Surgeon: ROME eJtt MD;  Location: [facility] OR    ENT SURGERY      ESOPHAGOSCOPY, GASTROSCOPY, DUODENOSCOPY (EGD), COMBINED  4/8/2013    Procedure: COMBINED ESOPHAGOSCOPY, GASTROSCOPY, DUODENOSCOPY (EGD), BIOPSY SINGLE OR MULTIPLE;  Gastroscopy;  Surgeon: Peter Pickard MD;  Location: [facility] GI    ESOPHAGOSCOPY, GASTROSCOPY, DUODENOSCOPY (EGD), COMBINED Left 10/28/2014    Procedure: COMBINED ESOPHAGOSCOPY, GASTROSCOPY, DUODENOSCOPY (EGD), BIOPSY SINGLE OR MULTIPLE;  Surgeon: Narcisa Ramirez MD;  Location:  OR    ESOPHAGOSCOPY, GASTROSCOPY, DUODENOSCOPY (EGD), COMBINED N/A 12/24/2018    Procedure: COMBINED ESOPHAGOSCOPY, GASTROSCOPY, DUODENOSCOPY (EGD), BIOPSY SINGLE OR MULTIPLE;  Surgeon: Sonu Verduzco MD;  Location: [facility] GI    INJECT EPIDURAL TRANSFORAMINAL LUMBAR / SACRAL EA ADDITIONAL LEVEL Left 3/18/2021    Procedure: Left L5/S1 transforaminal  injection for selective L5 nerve root block;  Surgeon: Eliza Pagan MD;  Location: [facility] OR    LAPAROSCOPIC CHOLECYSTECTOMY  11/20/2014    [facility], Dr. Ramirez    LASER HOLMIUM LITHOTRIPSY URETER(S), INSERT STENT, COMBINED  4/2/2014    Procedure: COMBINED CYSTOSCOPY, URETEROSCOPY, LASER HOLMIUM LITHOTRIPSY URETER(S), INSERT STENT;  Cystoscopy,Left Ureteral Stent Removal,Left Ureteroscopy with Laser Lithotripsy,Left Ureter Stent Placement;  Surgeon: ROME Jett MD;  Location: [facility] OR    Transurethral stone resection  03/11/2011     amLODIPine (NORVASC) 5 MG tablet  doxycycline monohydrate (MONODOX) 100 MG capsule  empagliflozin (JARDIANCE) 10 MG TABS tablet  gabapentin (NEURONTIN) 600 MG tablet  omeprazole (PRILOSEC) 20 MG DR capsule  QUEtiapine (SEROQUEL) 100 MG tablet  QUEtiapine (SEROQUEL) 300 MG tablet  rosuvastatin (CRESTOR) 40 MG tablet  testosterone (ANDROGEL 1.62 % PUMP) 20.25 MG/ACT gel  ACE/ARB/ARNI NOT PRESCRIBED (INTENTIONAL)  blood glucose (NO BRAND SPECIFIED) test strip  Continuous Blood Gluc  (FREESTYLE COLTEN 2 READER) ZEINAB  Continuous Blood Gluc Sensor (FREESTYLE COLTEN 2 SENSOR) MISC  insulin glargine (LANTUS PEN) 100 UNIT/ML pen  insulin pen needle (BD SAMANTHA U/F) 32G X 4 MM miscellaneous  LORazepam (ATIVAN) 1 MG tablet  ondansetron (ZOFRAN ODT) 4 MG ODT tab  thin (NO BRAND SPECIFIED) lancets      Allergies   Allergen Reactions    Haldol [Haloperidol] Other (See Comments)     Makes patient very angry and anxious    Adhesive Tape Hives    Percocet [Oxycodone-Acetaminophen] Nausea and Vomiting    Prednisone Other (See Comments) and Hives     Suicidal ideation    Risperidone Other (See Comments)    Tramadol Hcl Nausea and Vomiting    Droperidol Anxiety    Seroquel [Quetiapine] Palpitations     Spent 2 weeks in the hospital due to having seroquel, caused palpitations and QT prolongation     Family History  Family History   Problem Relation Age of Onset    Gastrointestinal Disease  "Father         Crohn's Disease    Cancer Father         Liver Cancer    Other Cancer Father         Liver    Obesity Father     Cancer Maternal Grandmother         lympoma    Diabetes Maternal Grandmother     Mental Illness Maternal Grandmother     Other Cancer Maternal Grandmother         Non Hodgkins Lymphoma    Diabetes Maternal Grandfather     Hypertension Maternal Grandfather     Prostate Cancer Maternal Grandfather     Hyperlipidemia Maternal Grandfather     Heart Disease Paternal Grandfather         heart disease    Hypertension Brother     Other Cancer Brother         Esophagecial    Diabetes Brother     Obesity Brother     Hyperlipidemia Mother     Mental Illness Mother     Anxiety Disorder Mother     Anesthesia Reaction Mother         Vomiting/Nausea    Other Cancer Mother     Hypertension Brother     Other Cancer Brother     Other Cancer Paternal Half-Brother     No Known Problems Sister      Social History   Social History     Tobacco Use    Smoking status: Every Day     Packs/day: 0.50     Years: 5.00     Additional pack years: 0.00     Total pack years: 2.50     Types: Other, Cigarettes     Start date: 2011     Last attempt to quit: 2022     Years since quittin.1     Passive exposure: Never    Smokeless tobacco: Former     Types: Chew     Quit date: 2019    Tobacco comments:     Just nicotine gum currently. 23   Vaping Use    Vaping Use: Former    Substances: Nicotine, Flavoring    Devices: Refillable tank   Substance Use Topics    Alcohol use: No    Drug use: Not Currently     Types: Marijuana, Opiates     Comment: denies using oxy or other opiates_\"not for years\"      Past medical history, past surgical history, medications, allergies, family history, and social history were reviewed with the patient. No additional pertinent items.      A complete review of systems was attempted but limited due to altered mental status.    Physical Exam   BP: (!) 142/88  Pulse: (!) 137  Temp: " "98.1  F (36.7  C)  Resp: 18  Height: 170.2 cm (5' 7\")  Weight: 104.8 kg (231 lb)  SpO2: 95 %  Physical Exam  Vitals and nursing note reviewed.   Constitutional:       General: He is not in acute distress.     Appearance: Normal appearance. He is not diaphoretic.      Comments: Pacing around the room, running up and down ER hallway   HENT:      Head: Atraumatic.      Mouth/Throat:      Mouth: Mucous membranes are moist.   Eyes:      General: No scleral icterus.     Conjunctiva/sclera: Conjunctivae normal.   Cardiovascular:      Rate and Rhythm: Tachycardia present.      Heart sounds: Normal heart sounds.   Pulmonary:      Effort: No respiratory distress.      Breath sounds: Normal breath sounds.   Abdominal:      General: Abdomen is flat.   Musculoskeletal:      Cervical back: Neck supple.   Skin:     General: Skin is warm.      Findings: No rash.   Neurological:      Mental Status: He is alert.             ED Course, Procedures, & Data      Procedures         Critical Care Addendum  My initial assessment, based on my review of nursing observations, focused history, and physical exam, established a high suspicion that Ayana Prasad has severe agitation, which requires immediate intervention, and therefore he is critically ill.     After the initial assessment, the care team initiated medication therapy with zyprexa x 3 and consulted with DEC , please refer to their note for further details  to provide stabilization care. Due to the critical nature of this patient, I reassessed physical exam, mental status, and respiratory status multiple times prior to his disposition.     Time also spent performing documentation, reviewing test results, discussion with consultants, and coordination of care.     Critical care time (excluding teaching time and procedures): 40 minutes.    Mental Health Risk Assessment        PSS-3      Date and Time Over the past 2 weeks have you felt down, depressed, or hopeless? Over the " past 2 weeks have you had thoughts of killing yourself? Have you ever attempted to kill yourself? When did this last happen? User   11/02/23 2143 no no yes -- MEM                Suicide assessment completed by mental health (D.E.C., LCSW, etc.)       No results found for any visits on 11/02/23.  Medications - No data to display  Labs Ordered and Resulted from Time of ED Arrival to Time of ED Departure - No data to display  No orders to display        Assessment & Plan      33 year old adult (female to 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; he/him/his) with a past medical history significant for bipolar affective disorder, schizophrenia with manic features, borderline personality disorder, episodic mood disorder, MDD with history of SI, polysubstance abuse (heroin, methamphetamine, marijuana), TBI, cauda equina syndrome with neurogenic bladder, nephrolithiasis, DM2, and HTN who presents to the ED from his group home with manic behavior.  Extreme agitated upon arrival, code 21 called and patient medicated with Zyprexa 5 mg IM.  Given report of overdose on 30 tablets of Ativan patient was allowed to metabolize presumed toxidrome under close observation, however patient never became excessively sedated as 1 would expect.  Due to behavioral concerns patient was given a second round of Zyprexa 5 mg IM, followed by a third round of Zyprexa 5 mg IM for persistent agitation and attempted to elope.  During the course of ED observation patient was observed to be repeatedly punching his head against the wall and developed some significant right-sided hand swelling, he was sent to x-ray for imaging of the hand which revealed no evidence of acute fracture.  DEC assessment order placed and pending at time of this dictation.    I have reviewed the nursing notes. I have reviewed the findings, diagnosis, plan and need for follow up with the patient.    New Prescriptions    No medications on file       Final diagnoses:   Agitation       Dziwe W Ntaba  MD [facility] EMERGENCY DEPARTMENT  11/2/2023     Katerina Hogue MD  11/03/23 0124

## 2023-11-03 NOTE — ED NOTES
Writer attempted to complete DEC assessment at 4:45 AM. Prakash was sleeping. Per psych associate, Parkash had been given medication earlier and had been fighting it and declining to move to the bed. Prakash was moved to the bed and is now asleep. Due to this, assessment cannot be completed at this time.    Narcisa Velasquez, DESIRE, LGSW

## 2023-11-03 NOTE — ED NOTES
"     Emergency Department Patient Sign-out       Brief HPI:  This is a 33 year old adult signed out to me by Dr. Hogue .  See initial ED Provider note for details of the presentation.            Significant Events prior to my assuming care: Pt here with 'manic' behavior. Reports taking extra ativan, unlikely clinically. DEC pending.    Patient apparently was sleepwalking. 1:1 was with patient, largely broke fall. Patient did not hit head, denies any injuries/pain. I see no head injury/extremity injury.      Exam:   Patient Vitals for the past 24 hrs:   BP Temp Temp src Pulse Resp SpO2 Height Weight   11/02/23 2139 (!) 142/88 98.1  F (36.7  C) Oral (!) 137 18 95 % 1.702 m (5' 7\") 104.8 kg (231 lb)           ED RESULTS:   Results for orders placed or performed during the hospital encounter of 11/02/23 (from the past 24 hour(s))   XR Hand Right G/E 3 Views     Status: None    Collection Time: 11/03/23 12:52 AM    Narrative    EXAM: XR HAND RIGHT G/E 3 VIEWS  LOCATION: [facility]  DATE: 11/3/2023    INDICATION: trauma  COMPARISON: 08/23/2023      Impression    IMPRESSION: Normal joint spaces and alignment. No fracture. Significant soft tissue swelling is seen in the hand, particularly over the dorsal aspect.       ED MEDICATIONS:   Medications   OLANZapine (zyPREXA) injection 5 mg (5 mg Intramuscular $Given 11/2/23 2210)   OLANZapine (zyPREXA) injection 5 mg (5 mg Intramuscular $Given 11/2/23 2327)   acetaminophen (TYLENOL) tablet 1,000 mg (1,000 mg Oral $Given 11/3/23 0027)   nicotine polacrilex (NICORETTE) gum 4 mg (4 mg Buccal $Given 11/3/23 0027)   ketorolac (TORADOL) injection 30 mg (30 mg Intramuscular $Given 11/3/23 0035)   OLANZapine (zyPREXA) injection 5 mg (5 mg Intramuscular $Given 11/3/23 0118)   melatonin tablet 5 mg (5 mg Oral $Given 11/3/23 0118)         Impression:    ICD-10-CM    1. Agitation  R45.1           Plan:    DEC assessment this AM.        Marv" MD Epifanio Herrera Matthew Joseph, MD  11/03/23 0639

## 2023-11-03 NOTE — PROGRESS NOTES
"RANDY Nurse spoke with patient 1:1 from staff request. Patient states they were their most stable when the took \"Prolixin injection, for maybe one year around 2020.\" States they stopped taking it, \"Because I felt better and didn't think I needed it anymore.\" Patient states they currently \"Feel great, like I don't need any medications.\" States their \"impulse control is low\" and they have been hearing auditory hallucinations today to leave. Patient understands they are going inpatient and cannot leave and we \"played the tape through\" of what would happen if they tried to leave \"I guess staff would have to make sure I don't, maybe I'd end in seclusion again.\" Patient states they are interested in \"trying Thorazine\" \"I saw it work on someone fast.\" Patient asked writer to speak to RN about providing a medication to help with their hallucinations and restlessness at the present moment. Provided report to staff RN.  "

## 2023-11-03 NOTE — ED PROVIDER NOTES
[facility] ED Mental Health Handoff Note:       Brief HPI:  This is a 33 year old adult signed out to me by Dr. Godfrey.  See initial ED Provider note for full details of the presentation. Interval history is pertinent for: negative hand xray. DEC is reaching out to his psychiatrist to do med reconciliation.     Home meds reviewed and ordered/administered: No awaiting psychiatrist recommendations    Medically stable for inpatient mental health admission: Yes.    Evaluated by mental health: Yes. The recommendation is for inpatient mental health treatment. Bed search in process    Safety concerns: At the time I received sign out, there were no safety concerns.    Hold Status:  Active Orders   Legal    Health Officer Authority to Detain (FAVIO)     Frequency: Effective Now     Start Date/Time: 11/02/23 2208      Number of Occurrences: Until Specified     Order Comments: This patient presented with an altered mental state that is suspected to be due to an intoxicating substance. The level of mentation is such that abuse of this substance is suspected. Given the patient's level of alteration and the circumstances in which the patient presented, it is likely that the patient utilizes intoxicating substances on a regular basis or has relapsed into utilizing them after a period of attempted sobriety. Given these circumstances, I am highly concerned that the patient has a problem with chemical dependency and cannot make safe decisions at this time. Currently, this endangers the patient's well-being and safety, and I am thus placing a Health Officer Authority hold upon the patient at this time.      Health Officer Authority to Detain (FAVIO)     Frequency: Effective Now     Start Date/Time: 11/02/23 2208      Number of Occurrences: Until Specified     Order Comments: Disorganized, poor judgement,  possible overdose              Exam:   Patient Vitals for the past 24 hrs:   BP Temp Temp src Pulse Resp SpO2 Height Weight  "  11/03/23 1149 (!) 150/103 98.4  F (36.9  C) Oral 105 19 96 % -- --   11/03/23 0353 113/76 98.6  F (37  C) Oral 91 -- 91 % -- --   11/02/23 2139 (!) 142/88 98.1  F (36.7  C) Oral (!) 137 18 95 % 1.702 m (5' 7\") 104.8 kg (231 lb)       General: Resting comfortably, NAD  HEENT. Atraumatic, MMM  Cardiovascular: HR normal  Respiratory: No respiratory distress  MSK: Atraumatic. Moving all extremities  Neurologic: Alert and Oriented. No focal deficits  Psych: Anxious      ED Course:    Medications   lithium (ESKALITH CR/LITHOBID) CR tablet 900 mg (has no administration in time range)   amLODIPine (NORVASC) tablet 10 mg (10 mg Oral $Given 11/3/23 1230)   QUEtiapine (SEROquel) tablet 300 mg (has no administration in time range)   QUEtiapine (SEROquel) tablet 100 mg (has no administration in time range)   LORazepam (ATIVAN) tablet 1 mg (1 mg Oral $Given 11/3/23 1230)   nicotine polacrilex (NICORETTE) gum 4 mg (4 mg Buccal $Given 11/3/23 1217)   OLANZapine (zyPREXA) injection 5 mg (5 mg Intramuscular $Given 11/2/23 2210)   OLANZapine (zyPREXA) injection 5 mg (5 mg Intramuscular $Given 11/2/23 2327)   acetaminophen (TYLENOL) tablet 1,000 mg (1,000 mg Oral $Given 11/3/23 0027)   nicotine polacrilex (NICORETTE) gum 4 mg (4 mg Buccal $Given 11/3/23 0027)   ketorolac (TORADOL) injection 30 mg (30 mg Intramuscular $Given 11/3/23 0035)   OLANZapine (zyPREXA) injection 5 mg (5 mg Intramuscular $Given 11/3/23 0118)   melatonin tablet 5 mg (5 mg Oral $Given 11/3/23 0118)            There were no significant events during my shift.    Patient was signed out to the oncoming provider at the end of my shift.      Impression:    ICD-10-CM    1. Agitation  R45.1           Plan:    Awaiting inpatient mental health admission/transfer.      RESULTS:   Results for orders placed or performed during the hospital encounter of 11/02/23 (from the past 24 hour(s))   XR Hand Right G/E 3 Views     Status: None    Collection Time: 11/03/23 12:52 AM    " Narrative    EXAM: XR HAND RIGHT G/E 3 VIEWS  LOCATION: [facility]  DATE: 11/3/2023    INDICATION: trauma  COMPARISON: 08/23/2023      Impression    IMPRESSION: Normal joint spaces and alignment. No fracture. Significant soft tissue swelling is seen in the hand, particularly over the dorsal aspect.             MD Adam Lynn Michael, MD  11/03/23 [id number]

## 2023-11-03 NOTE — ED NOTES
Pt punched the wall of their room with a closed fist and injured their r hand. Writer offered an ice pack and pt declined stating they couldn't hold onto it right now, they needed to pace. Pt states they are hearing voices telling them to run away, pt responding to internal stimuli and yelling at a voice named Dottie.

## 2023-11-03 NOTE — CONSULTS
Diagnostic Evaluation Consultation  Crisis Assessment    Patient Name: Ayana Prasad  Age:  33 year old  Legal Sex: female  Gender Identity: transgender male  Pronouns: he/him/his  Race: White  Ethnicity: Not  or   Language: English      Patient was assessed: In person      Patient location: [facility] EMERGENCY DEPARTMENT                             ED09    Referral Data and Chief Complaint  Ayana Prasad presents to the ED via EMS. Patient is presenting to the ED for the following concerns: Paranoia, Anxiety, Significant behavioral change.   Factors that make the mental health crisis life threatening or complex are:  The pt was transported to the ED from his group home due to increasing paranoia, auditory hallucinations, and manic behavior. The pt is followed by [facility], his medication provider is Regine Last CNP. Per collateral from Lucie at the clinic, the pt is not medication compliant, and the group home where the pt resides does not administer resident's medications. The pt has been steadily decompensating, the group home does not feel they can keep the pt safe, and the pt's provisional discharge was revoked.      Informed Consent and Assessment Methods  Explained the crisis assessment process, including applicable information disclosures and limits to confidentiality, assessed understanding of the process, and obtained consent to proceed with the assessment.  Assessment methods included conducting a formal interview with patient, review of medical records, collaboration with medical staff, and obtaining relevant collateral information from family and community providers when available: done     Patient response to interventions: acceptance expressed  Coping skills were attempted to reduce the crisis:  The pt states they were not able to use any coping skills to reduce the crisis. The writer engaged the pt in disposition planning.     History of the Crisis    The pt has a diagnosis history significant for schizoaffective disorder- bipolar type, borderline personality disorder, AVA, ADHD, PTSD and polysubstance abuse (THC, methamphetamine, opioids). Pt is under MI commitment through [facility], provisional discharge was revoked on 10/31, due to medication noncompliance and increased psychosis symptoms.    Brief Psychosocial History  Family:  Single, Children no  Support System:  Parent(s), Facility resident(s)/Staff  Employment Status:  unemployed  Source of Income:  unable to assess  Financial Environmental Concerns:  unemployed  Current Hobbies:  television/movies/videos  Barriers in Personal Life:  behavioral concerns, mental health concerns    Significant Clinical History  Current Anxiety Symptoms:  anxious, racing thoughts  Current Depression/Trauma:  sense of doom, irritable  Current Somatic Symptoms:  racing thoughts, anxious (pacing)  Current Psychosis/Thought Disturbance:  auditory hallucinations, impulsive, agitation  Current Eating Symptoms:  loss of appetite  Chemical Use History:  Alcohol: None  Benzodiazepines: None  Opiates: None  Cocaine: None  Marijuana: None  Other Use: None, Other (comments) (pt has a hx of polysubstance abuse, no utox at this time)   Past diagnosis:  ADHD, Anxiety Disorder, Personality Disorder, Schizophrenia, PTSD, Substance Use Disorder, Depression  Family history:  No known history of mental health or chemical health concerns  Past treatment:  Individual therapy, Case management, Civil Commitment, Psychiatric Medication Management, Inpatient Hospitalization, Supportive Living Environment (group home, retirement house, etc), Primary Care  Details of most recent treatment:  Pt's most recent mental health admissions were at [facility] from 8/8-8/28/23 and also 5/24-6/9/23. Pt has resided at [facility] for the last 3 years, group home does not administer pt's medications. Pt has medication provider at [facility] psych clinic  Regine Last CNP, and the pt has a history of medication noncompliance. Pt is under civil commitment through [facility], pt's provisional discharge was revoked on 10/31/23 for med noncompliance and increasing psychosis sx's.  Other relevant history:          Collateral Information  Is there collateral information: Yes     Collateral information name, relationship, phone number:  Lucie zuniga [facility] psych clinic [phone]    What happened today: Pt has been in and out of the ED over the last week, clinic has reached out to the pt's  to request the pt's provisional discharge be revoked. the pt will state that their psychiatric provider has changed or taken him off his meds, but that is not the case. The pt's medication provider has not changed the pt's meds, he has chosen to be noncompliant. The pt is currently only taking ativan and seroquel, he has chosen to stop taking lithium. Pt's psychiatrist states the pt has been the most stable when he takes all of his meds, specifically the lithium has been stabilizing.     What is different about patient's functioning: Increased AVH, hyperverbal and hyperactive. Also, med noncompliance     Concern about alcohol/drug use:  possible methamphetamine use    What do you think the patient needs:  inpatient MH admission    Has patient made comments about wanting to kill themselves/others: no    If d/c is recommended, can they take part in safety/aftercare planning:  yes    Additional collateral information:        Risk Assessment  [place] Suicide Severity Rating Scale Full Clinical Version:10/26/23      [place] Suicide Severity Rating Scale Recent: 11/3/23  Suicidal Ideation (Recent)  Q1 Wished to be Dead (Past Month): yes  Q2 Suicidal Thoughts (Past Month): yes  Q3 Suicidal Thought Method: no  Q4 Suicidal Intent without Specific Plan: no  Q5 Suicide Intent with Specific Plan: no  Level of Risk per Screen: low risk  Intensity of Ideation (Recent)  Most  Severe Ideation Rating (Past 1 Month): 2  Frequency (Past 1 Month): Once a week  Duration (Past 1 Month): Less than 1 hour/some of the time  Controllability (Past 1 Month): Can control thoughts with some difficulty  Deterrents (Past 1 Month): Uncertain that deterrents stopped you  Reasons for Ideation (Past 1 Month): Completely to end or stop the pain (You couldn't go on living with the pain or how you were feeling)  Suicidal Behavior (Recent)  Actual Attempt (Past 3 Months): No  Total Number of Actual Attempts (Past 3 Months): 0  Has subject engaged in non-suicidal self-injurious behavior? (Past 3 Months): No  Interrupted Attempts (Past 3 Months): No  Total Number of Interrupted Attempts (Past 3 Months): 0  Aborted or Self-Interrupted Attempt (Past 3 Months): No  Total Number of Aborted or Self-Interrupted Attempts (Past 3 Months): 0  Preparatory Acts or Behavior (Past 3 Months): No  Total Number of Preparatory Acts (Past 3 Months): 0    Environmental or Psychosocial Events: unemployment/underemployment, impulsivity/recklessness, other life stressors, ongoing abuse of substances, neither working nor attending school  Protective Factors: Protective Factors: strong bond to family unit, community support, or employment, responsibilities and duties to others, including pets and children, supportive ongoing medical and mental health care relationships, able to access care without barriers    Does the patient have thoughts of harming others? Feels Like Hurting Others: no  Previous Attempt to Hurt Others: no  Current presentation:  (Pacing, agitated)  Is the patient engaging in sexually inappropriate behavior?: no    Is the patient engaging in sexually inappropriate behavior?  no        Mental Status Exam   Affect: Labile  Appearance: Appropriate  Attention Span/Concentration: Inattentive  Eye Contact: Variable    Fund of Knowledge: Appropriate   Language /Speech Content: Fluent  Language /Speech Volume: Normal  Language  /Speech Rate/Productions: Pressured  Recent Memory: Variable  Remote Memory: Variable  Mood: Anxious, Irritable  Orientation to Person: Yes   Orientation to Place: Yes  Orientation to Time of Day: Yes  Orientation to Date: Yes     Situation (Do they understand why they are here?): Yes  Psychomotor Behavior: Agitated, Hyperactive  Thought Content: Hallucinations, Paranoia  Thought Form: Paranoia          Medication  Psychotropic medications:   Medication Orders - Psychiatric (From admission, onward)      Start     Dose/Rate Route Frequency Ordered Stop    11/03/23 2200  lithium (ESKALITH CR/LITHOBID) CR tablet 900 mg         900 mg Oral AT BEDTIME 11/03/23 1150      11/03/23 2200  QUEtiapine (SEROquel) tablet 300 mg         300 mg Oral AT BEDTIME 11/03/23 1151      11/03/23 1216  nicotine polacrilex (NICORETTE) gum 4 mg        Note to Pharmacy: DO NOT USE THIS FIELD FOR ADMIN INSTRUCTIONS; INFORMATION DOES NOT SHOW ON MAR. USE THE FIELD ABOVE MARKED ADMIN INSTRUCTIONS    4 mg Buccal EVERY 1 HOUR PRN 11/03/23 1217      11/03/23 1155  QUEtiapine (SEROquel) tablet 100 mg         100 mg Oral EVERY MORNING 11/03/23 1151      11/03/23 1151  LORazepam (ATIVAN) tablet 1 mg        Note to Pharmacy: DO NOT USE THIS FIELD FOR ADMIN INSTRUCTIONS; INFORMATION DOES NOT SHOW ON MAR. USE THE FIELD ABOVE MARKED ADMIN INSTRUCTIONS    1 mg Oral DAILY PRN 11/03/23 1151               Current Care Team  Patient Care Team:  Becki Avery APRN CNP as PCP - General (Family Medicine)  Kathie Sandhu MD as MD (Neurology)  Tessa Galvan, RN as Specialty Care Coordinator  Ashu Russell DO as MD (Psychiatry & Neurology - Neurology)  Becki Avery APRN CNP as Assigned PCP  Desmond West as Specialty Care Coordinator (Plastic Surgery)  Harvey Negron MD as MD (Psychiatry)  Dora Mazariegos, PhD (Student in organized health care education/training program)  Glenda Juan MD as  Resident (Family Medicine)  Ayana  as   Regine Last APRN CNP as Nurse Practitioner (Psychiatry)  Oswaldo Amado MD as MD (Dermatology)  Regine Last APRN CNP as Assigned Behavioral Health Provider  Mark Cleary MD as MD (Dermatology)  Lucie Chan, RN as Specialty Care Coordinator (Psychiatry)  Ashu Russell DO as MD (Neurology)  Luz Moncada APRN CNP as Assigned Neuroscience Provider  Alice Tubbs [facility] as Pharmacist (Pharmacist)  Mark Cleary MD as Assigned Surgical Provider  Moriah Rojas [facility] as Pharmacist (Pharmacist)  Moriah Rojas [facility] as Assigned MT Pharmacist  Leventhal, Thomas Michael, MD as MD (Gastroenterology)  Mark Cleary MD as MD (Dermatology)    Diagnosis  Patient Active Problem List   Diagnosis Code    ADHD (attention deficit hyperactivity disorder) F90.9    Bipolar 1 disorder, manic, mild F31.11    Marijuana abuse F12.10    Polysubstance abuse (H) F19.10    GERD (gastroesophageal reflux disease) K21.9    Tobacco abuse Z72.0    Intractable back pain M54.9    Optic neuritis H46.9    Cauda equina syndrome with neurogenic bladder (H) G83.4    Schizoaffective disorder, bipolar type (H) F25.0    PTSD (post-traumatic stress disorder) F43.10    Anxiety F41.9    Auditory hallucination R44.0    Nephrolithiasis N20.0    Cyst of left ovary N83.202    Borderline personality disorder (H) F60.3    Cannabis use disorder, severe, dependence (H) F12.20    Depression F32.A    Episodic mood disorder (H24) F39    History of heroin abuse (H) F11.11    Moderate episode of recurrent major depressive disorder (H) F33.1    Opioid use disorder, severe, dependence (H) F11.20    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    Nausea R11.0    Overdose T50.901A    Bella (H) F30.9    Urinary retention R33.9    Chronic bilateral low back pain without sciatica M54.50, G89.29    AVA (generalized  anxiety disorder) F41.1    Aggressive behavior R46.89    Gender identity disorder F64.9    Lumbosacral radiculopathy at L5 M54.17    DUB (dysfunctional uterine bleeding) N93.8    Seizure-like activity (H) R56.9    Acanthosis nigricans L83    Schizoaffective disorder, chronic condition with acute exacerbation (H) F25.9    Bipolar affective disorder, mixed, severe, with psychotic behavior (H) F31.64    Schizophrenia, schizoaffective, chronic with acute exacerbation (H) F25.9    Akathisia G25.71    Hypertension, unspecified type I10    Female-to-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 person Z78.9    Morbid obesity (H) E66.01    Diabetes mellitus, type 2 (H) E11.9    Suicidal ideation R45.851    Mood disorder due to a general medical condition F06.30       Primary Problem This Admission  Active Hospital Problems    Anxiety      Schizoaffective disorder, bipolar type (H)      Borderline personality disorder (H)      Depression      Polysubstance abuse (H)      ADHD (attention deficit hyperactivity disorder)        Clinical Summary and Substantiation of Recommendations   The pt has a diagnosis history significant for schizoaffective disorder- bipolar type, borderline personality disorder, AVA, ADHD, PTSD and polysubstance abuse (THC, methamphetamine, opioids). Pt is under MI commitment through [facility], provisional discharge was revoked on 10/31, due to medication noncompliance and increased psychosis symptoms. The pt was transported to the ED from his group home due to increasing paranoia, auditory hallucinations, and manic behavior. The pt is followed by [facility] clinic, his medication provider is Regine Last CNP. Per collateral from Lucie at the clinic, the pt is not medication compliant, and the group home where the pt resides does not administer resident's medications. The pt has been steadily decompensating, the group home does not feel they can keep the pt safe. After therapeutic assessment and intervention by  the ED care team and [facility], in consultation with attending provider, the pt's decompensating mental health make inpatient mental health admission necessary to stabilize the pt and address medication concerns.          Severe psychiatric, behavioral or other comorbid conditions are appropriate for management at inpatient mental health as indicated by at least one of the following: Impaired impulse control, judgement, or insight, Psychiatric Symptoms  Severe dysfunction in daily living is present as indicated by at least one of the following: Other evidence of severe dysfunction  Situation and expectations are appropriate for inpatient care: Voluntary treatment at lower level of care is not feasible  Inpatient mental health services are necessary to meet patient needs and at least one of the following: Specific condition related to admission diagnosis is present and judged likely to deteriorate in absence of treatment at proposed level of care      Patient coping skills attempted to reduce the crisis:  The pt states they were not able to use any coping skills to reduce the crisis. The writer engaged the pt in disposition planning.    Disposition  Recommended disposition: Inpatient Mental Health        Reviewed case and recommendations with attending provider. Attending Name: Dr. Medina       Attending concurs with disposition: yes       Patient and/or validated legal guardian concurs with disposition:   yes       Final disposition:  inpatient mental health    Legal status on admission:  MI civil commitment provisional discharge revoked on 10/31/23    Assessment Details   Total duration spent with the patient: 35 min     CPT code(s) utilized: 22490 - Psychotherapy for Crisis - 60 (30-74*) min    KENTRELL BENITEZ Psychotherapist  [facility] - Triage & Transition Services  Callback: [phone]

## 2023-11-03 NOTE — PHARMACY-ADMISSION MEDICATION HISTORY
Pharmacy Intern Admission Medication History    Admission medication history is complete. The information provided in this note is only as accurate as the sources available at the time of the update.    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 Patient was restarted on quetiapine in Jun 2023. He reports no adverse effects.  Patient is not taking clonazepam, lamotrigine, lithium, or trifluoperazine    Changes made to PTA medication list:  Added:   Tretinoin 0.05% cream  Fish oil  Deleted: None  Changed: None    Medication Affordability:       Allergies reviewed with patient and updates made in EHR: yes    Medication History Completed By: Emily Marino 11/3/2023 5:28 PM    Prior to Admission medications    Medication Sig Last Dose Taking? Auth Provider Long Term End Date   amLODIPine (NORVASC) 5 MG tablet Take 10 mg by mouth daily 11/2/2023 Yes Reported, Patient Yes    doxycycline monohydrate (MONODOX) 100 MG capsule Take 1 capsule (100 mg) by mouth 2 times daily 11/2/2023 Yes Tessa Mendoza MD     empagliflozin (JARDIANCE) 10 MG TABS tablet Take 1 tablet (10 mg) by mouth daily 11/2/2023 Yes Becki Avery APRN CNP     fish oil-omega-3 fatty acids 1000 MG capsule Take 1 g by mouth daily 11/2/2023 Yes Unknown, Entered By History     gabapentin (NEURONTIN) 600 MG tablet Take 2 tablets (1,200 mg) by mouth 3 times daily 11/2/2023 Yes Becki Avery APRN CNP Yes    insulin glargine (LANTUS PEN) 100 UNIT/ML pen Inject 25 Units Subcutaneous every morning (before breakfast) 11/2/2023 Yes Becki Avery APRN CNP Yes    LORazepam (ATIVAN) 1 MG tablet Take 1 tablet (1 mg) by mouth daily as needed for anxiety Past Month Yes Regine Last APRN CNP No    omeprazole (PRILOSEC) 20 MG DR capsule Take 1 capsule (20 mg) by mouth daily  11/2/2023 Yes Tessa Mendoza MD No    ondansetron (ZOFRAN ODT) 4 MG ODT tab Take 1 tablet (4 mg) by mouth daily as needed for nausea More than a month Yes Tessa Mendoza MD     QUEtiapine (SEROQUEL) 100 MG tablet Take 1 tablet (100 mg) by mouth every morning 11/2/2023 Yes Regine Last APRN CNP Yes    QUEtiapine (SEROQUEL) 300 MG tablet Take 1 tablet (300 mg) by mouth at bedtime 11/2/2023 Yes Regine Last APRN CNP Yes    rosuvastatin (CRESTOR) 40 MG tablet Take 1 tablet (40 mg) by mouth daily 11/2/2023 Yes Becki Avery APRN CNP Yes    testosterone (ANDROGEL 1.62 % PUMP) 20.25 MG/ACT gel Place 2 Pump onto the skin daily Apply from dispenser to clean, dry, intact skin of the upper arms and shoulders. 11/2/2023 Yes Reported, Patient Yes    tretinoin (RETIN-A) 0.05 % external gel Apply topically at bedtime to affected area 11/2/2023 Yes Unknown, Entered By History No    ACE/ARB/ARNI NOT PRESCRIBED (INTENTIONAL) Please choose reason not prescribed from choices below.  Patient not taking: Reported on 10/31/2023   Becki Avery APRN CNP Yes    blood glucose (NO BRAND SPECIFIED) test strip Use to test blood sugar one times daily and as needed. To accompany: Blood Glucose Monitor Brands: per insurance.   Becki Avery APRN CNP     Continuous Blood Gluc  (FREESTYLE COLTEN 2 READER) [facility] Use to read blood sugars as per 's instructions.   Emily Esteves PA-C     Continuous Blood Gluc Sensor (FREESTYLE COLTEN 2 SENSOR) [facility] Use to read blood sugars per 's instructions. Change sensor every 14 days.   Emily Esteves PA-C     insulin pen needle (BD SAMANTHA U/F) 32G X 4 MM miscellaneous Use 1 pen needles daily or as directed.   Becki Avery APRN CNP     thin (NO BRAND SPECIFIED) lancets Use with lanceting device to check blood sugars once per day. To accompany: Blood Glucose Monitor Brands: per insurance.   Becki Avery  [place] Quinones CNP

## 2023-11-03 NOTE — ED NOTES
Pt attempted to stand up, as he was sleeping on chair. It appeared as if pt was sleepwalking with his eyes slightly open. Pt fell slowly to the ground with support of sitter. Charge nurse and pt's nurse were notified. Pt was then assisted by writer and charge nurse to the bed. Dr. Godfrey assessed pt's head for injuries. No injuries found. Pt denied injuries. Pt remained sleeping throughout assessment.

## 2023-11-03 NOTE — ED NOTES
"Pt states he feels overwhelmed. Pt feels he needed to \"run away\". Writer talked with patient at length looking other alternative that we can do instead of running. Also offered to read book from home or start coloring. Pt still feels he wants to run. [facility] team called and they will be here in five minutes.  "

## 2023-11-03 NOTE — ED NOTES
"Pt contracted for safety. Pt stated, \"I won't try to leave. Does endorses auditory hallucination, \"voice tells me to leave.\" Seclusion stopped at this time. Excising some coping skills with pt, (coloring, puzzle, and deep breathing). Pt reports injury right hand pain. MD aware.   "

## 2023-11-03 NOTE — ED NOTES
IP MH Referral Acuity Rating Score (RARS)    LMHP complete at referral to IP MH, with [facility]; and, daily while awaiting IP MH placement. Call score to PPS.  CRITERIA SCORING   New 72 HH and Involuntary for IP MH (not adolescent) 0/1   Boarding over 24 hours 0/1   Vulnerable adult at least 55+ with multiple co morbidities; or, Patient age 11 or under 0/1   Suicide ideation without relief of precipitating factors 0/1   Current plan for suicide 0/1   Current plan for homicide 0/1   Imminent risk or actual attempt to seriously harm another without relief of factors precipitating the attempt 1/1   Severe dysfunction in daily living (ex: complete neglect for self care, extreme disruption in vegetative function, extreme deterioration in social interactions) 0/1   Recent (last 2 weeks) or current physical aggression in the ED 1/1   Restraints or seclusion episode in ED 1/1   Verbal aggression, agitation, yelling, etc., while in the ED 1/1   Active psychosis with psychomotor agitation or catatonia 1/1   Need for constant or near constant redirection (from leaving, from others, etc).  0/1   Intrusive or disruptive behaviors 1/1   TOTAL Acuity Total Score: 6

## 2023-11-04 ENCOUNTER — TELEPHONE (OUTPATIENT)
Dept: BEHAVIORAL HEALTH | Facility: CLINIC | Age: 33
End: 2023-11-04

## 2023-11-04 ENCOUNTER — TELEPHONE (OUTPATIENT)
Dept: BEHAVIORAL HEALTH | Facility: CLINIC | Age: 33
End: 2023-11-04
Payer: MEDICARE

## 2023-11-04 PROCEDURE — 250N000011 HC RX IP 250 OP 636: Mod: JZ | Performed by: EMERGENCY MEDICINE

## 2023-11-04 PROCEDURE — A9270 NON-COVERED ITEM OR SERVICE: HCPCS | Performed by: EMERGENCY MEDICINE

## 2023-11-04 PROCEDURE — 250N000013 HC RX MED GY IP 250 OP 250 PS 637: Performed by: EMERGENCY MEDICINE

## 2023-11-04 PROCEDURE — 250N000012 HC RX MED GY IP 250 OP 636 PS 637: Performed by: EMERGENCY MEDICINE

## 2023-11-04 PROCEDURE — 96372 THER/PROPH/DIAG INJ SC/IM: CPT | Performed by: EMERGENCY MEDICINE

## 2023-11-04 RX ORDER — GABAPENTIN 300 MG/1
1200 CAPSULE ORAL 3 TIMES DAILY
Status: DISCONTINUED | OUTPATIENT
Start: 2023-11-04 | End: 2023-11-06

## 2023-11-04 RX ORDER — KETOROLAC TROMETHAMINE 15 MG/ML
15 INJECTION, SOLUTION INTRAMUSCULAR; INTRAVENOUS ONCE
Status: COMPLETED | OUTPATIENT
Start: 2023-11-04 | End: 2023-11-04

## 2023-11-04 RX ORDER — OLANZAPINE 10 MG/1
10 TABLET, ORALLY DISINTEGRATING ORAL DAILY PRN
Status: DISCONTINUED | OUTPATIENT
Start: 2023-11-04 | End: 2023-11-06

## 2023-11-04 RX ORDER — PANTOPRAZOLE SODIUM 40 MG/1
40 TABLET, DELAYED RELEASE ORAL DAILY
Status: DISCONTINUED | OUTPATIENT
Start: 2023-11-05 | End: 2023-11-29 | Stop reason: HOSPADM

## 2023-11-04 RX ORDER — OLANZAPINE 10 MG/1
10 TABLET, ORALLY DISINTEGRATING ORAL ONCE
Status: COMPLETED | OUTPATIENT
Start: 2023-11-04 | End: 2023-11-04

## 2023-11-04 RX ORDER — TESTOSTERONE GEL, 1% 10 MG/G
50 GEL TRANSDERMAL DAILY
Status: DISCONTINUED | OUTPATIENT
Start: 2023-11-04 | End: 2023-11-29 | Stop reason: HOSPADM

## 2023-11-04 RX ORDER — ROSUVASTATIN CALCIUM 20 MG/1
40 TABLET, COATED ORAL DAILY
Status: DISCONTINUED | OUTPATIENT
Start: 2023-11-05 | End: 2023-11-29 | Stop reason: HOSPADM

## 2023-11-04 RX ORDER — DIPHENHYDRAMINE HCL 25 MG
25 CAPSULE ORAL ONCE
Status: COMPLETED | OUTPATIENT
Start: 2023-11-04 | End: 2023-11-04

## 2023-11-04 RX ORDER — QUETIAPINE FUMARATE 50 MG/1
50 TABLET, FILM COATED ORAL 2 TIMES DAILY
Status: COMPLETED | OUTPATIENT
Start: 2023-11-04 | End: 2023-11-04

## 2023-11-04 RX ADMIN — OLANZAPINE 10 MG: 10 TABLET, ORALLY DISINTEGRATING ORAL at 06:46

## 2023-11-04 RX ADMIN — EMPAGLIFLOZIN 10 MG: 10 TABLET, FILM COATED ORAL at 08:26

## 2023-11-04 RX ADMIN — DIPHENHYDRAMINE HYDROCHLORIDE 25 MG: 25 CAPSULE ORAL at 21:53

## 2023-11-04 RX ADMIN — TESTOSTERONE 50 MG OF TESTOSTERONE: 50 GEL TRANSDERMAL at 21:53

## 2023-11-04 RX ADMIN — LITHIUM CARBONATE 900 MG: 450 TABLET, EXTENDED RELEASE ORAL at 19:46

## 2023-11-04 RX ADMIN — KETOROLAC TROMETHAMINE 15 MG: 15 INJECTION, SOLUTION INTRAMUSCULAR; INTRAVENOUS at 16:17

## 2023-11-04 RX ADMIN — OLANZAPINE 10 MG: 10 TABLET, ORALLY DISINTEGRATING ORAL at 16:47

## 2023-11-04 RX ADMIN — QUETIAPINE FUMARATE 100 MG: 100 TABLET ORAL at 07:46

## 2023-11-04 RX ADMIN — NICOTINE POLACRILEX 4 MG: 4 GUM, CHEWING BUCCAL at 07:47

## 2023-11-04 RX ADMIN — INSULIN GLARGINE 25 UNITS: 100 INJECTION, SOLUTION SUBCUTANEOUS at 08:27

## 2023-11-04 RX ADMIN — NICOTINE POLACRILEX 4 MG: 4 GUM, CHEWING BUCCAL at 15:12

## 2023-11-04 RX ADMIN — NICOTINE POLACRILEX 4 MG: 4 GUM, CHEWING BUCCAL at 16:17

## 2023-11-04 RX ADMIN — NICOTINE POLACRILEX 4 MG: 4 GUM, CHEWING BUCCAL at 19:49

## 2023-11-04 RX ADMIN — NICOTINE POLACRILEX 4 MG: 4 GUM, CHEWING BUCCAL at 03:39

## 2023-11-04 RX ADMIN — LORAZEPAM 1 MG: 1 TABLET ORAL at 11:51

## 2023-11-04 RX ADMIN — QUETIAPINE FUMARATE 50 MG: 50 TABLET ORAL at 15:27

## 2023-11-04 RX ADMIN — LORAZEPAM 1 MG: 1 TABLET ORAL at 03:38

## 2023-11-04 RX ADMIN — NICOTINE POLACRILEX 4 MG: 4 GUM, CHEWING BUCCAL at 06:19

## 2023-11-04 RX ADMIN — QUETIAPINE 300 MG: 100 TABLET ORAL at 19:45

## 2023-11-04 RX ADMIN — AMLODIPINE BESYLATE 10 MG: 5 TABLET ORAL at 07:46

## 2023-11-04 RX ADMIN — GABAPENTIN 1200 MG: 300 CAPSULE ORAL at 19:46

## 2023-11-04 ASSESSMENT — ACTIVITIES OF DAILY LIVING (ADL)
ADLS_ACUITY_SCORE: 37

## 2023-11-04 NOTE — TELEPHONE ENCOUNTER
R: [place]  Access Inpatient Bed Call Log 11/4/2023 @ 5:20 PM:  Intake has called facilities that have not updated the bed status within the last 12 hours.                             [facility] is posting 0 beds.          [facility] is posting 0 beds. [phone].   [facility] is posting 0 beds. Negative covid required.                [facility] is posting 0 bed. Negative covid required. [phone]; they are at cap for the weekend.   [facility] is posting 0 bed. [phone]  [facility] is posting 0 beds. [phone].   [facility] is posting 5 beds for Young Adults ages 18-26. Call for details. Negative covid.  [phone].   [facility] is posting 0 beds          [facility] is posting 0 beds.    [facility] is posting 0 beds.   LOW acuity ONLY. Mixed unit 12+. Negative covid- [phone].    [facility] is posting 1 bed.  No aggression. Negative Covid. Low acuity.   Per Staff, no bed available.  [facility] is posting 0 beds. Negative covid. [phone] leave a message. No option to speak with a live rep.   [facility] ([place]) is posting 1 bed. Low acuity only. Negative covid.  [phone].  Per call 6:00PM pt not appropriate for low acuity bed.  [facility] is posting 0 beds. Low acuity. No current aggression.    [facility] ([place]) is posting 0 beds. Negative covid.  [phone].      [facility] is posting 0 beds. Low acuity. 72 HH hold preferred. Negative covid required. [phone].   [facility] ([place]) is posting 1 bed. Low acuity only. Negative covid.  [phone].   [facility] [place] is posting 5 beds.  Negative covid required.   Vol only, No history of aggression, violence, or assault. No sexual offenders. No 72 HH holds. [phone].       [facility] is posting 5 beds. Negative covid required.  (Must have the cognitive  ability to do programming. No aggressive or violent behavior or recent HX in the last 2 yrs. MH must be primary.) Always low acuity. [phone].  Facilities fax is currently not working.  Not receiving fax.  [facility] has 0 beds posted. Negative covid required.  Low acuity only. Violence and aggression capped.  [phone]   [facility] is posting 0 beds. Low acuity, Negative covid required. [phone]  [facility] is posting 2 beds. Unit is a combined unit (14+). No aggressive patients. Voluntary only. Must be accompanied by a guardian.  Negative covid. [phone].  Pt does not meet criteria for bed due to hold satus.    [facility] posting 1 bed. Negative covid required.  [phone].  Low acuity only.  Pt not appropriate.   [facility] [place] is posting 0 beds. Negative covid. LOW acuity. (No lines, drains, or tubes, oxygen, CPAP, IV, etc.). [phone];   [facility] is posting 4 beds. No covid test required.  [phone].  Out of [place].  Not appropriate per hold status.    [facility] ([facility]) is posting 1 bed. Negative covid. (No. lines, drains, or tubes, oxygen, CPAP, IV, etc.). [phone]      Pt remains on the work list pending appropriate bed availability.

## 2023-11-04 NOTE — TELEPHONE ENCOUNTER
R: [place]  Access Inpatient Bed Call Log 11/3/2023 @ 8:11PM     Intake has called facilities that have not updated the bed status within the last 12 hours.                               [facility] is posting 0 beds.          [facility] is posting 0 beds. [phone].   [facility] is posting 0 beds. Negative covid required.                [facility] is posting 0 bed. Negative covid required. [phone]  [facility] is posting 0 bed. [phone]  [facility] is posting 0 beds. [phone].   [facility] is posting 1 bed for Young Adults age 18-26.          Pt is not age appropriate.   for details. Negative covid.  [phone].  [facility] is posting 0 beds          [facility] ([facility]) is posting 0 beds.      Pt remains on the work list pending appropriate bed availability.

## 2023-11-04 NOTE — TELEPHONE ENCOUNTER
R; Intake received via RF 11/04/2023 8:46:40 AM EX PARTE ORDER FOR RETURN TO FACILITY Filed in Court/Commitment/Ashley Hold folder.

## 2023-11-04 NOTE — ED NOTES
"Pt reports that the increased Seroquel in the morning has helped \"quite a bit\", Pt appears much calmer than earlier this morning, has stopped pacing, is seated and holding conversation with sitter.   "

## 2023-11-04 NOTE — TELEPHONE ENCOUNTER
No appropriate beds are currently available within the  system. Bed search update (Statewide) @ 3AM:         [facility]: @ cap per website. Per [place] @ 03:00, they are capped   [facility]: @ cap per website   [facility]: @ cap per website. Per Marta @ 03:01, they are full and suggests calling back on day shift   [facility]: @ cap per website   [facility]: @ cap per website   [facility]: Posting 4 beds (Young Adult unit: No recent aggressions, violence or sexual assault. No psychosis. Neg covid required).  Pt not appropriate d/t facility restrictions   [facility]: @ cap per website   [place]: @ cap per website   [facility]: @ cap per website   [facility]: @ cap per website  [facility]: Posting 1 bed; low acuity. Per Maximilian @ 03:02, check back later re: bed availability  [facility]: @ cap per website   [facility]: Posting 1 bed; low acuity. Per Maximilian @ 03:02, check back later re: bed availability   [facility]: Does not review after 10PM.     [facility]: Posting 1 bed; low acuity.  Per Aga @ 03:03, open beds. Lower acuity  [facility]: @ cap per website. Per Aga @ 03:03, no adult beds   [facility]: Posting 1 bed; low acuity.  Per Aga @ 03:03 [facility] is closed  [facility]: Posting 5 beds (No hx of aggression. No sexual offenders. Voluntary patients only). Meets exclusionary d/t involuntary status/Revocation of PD  [facility]: Posting 4 beds (Always low acuity only. Must have the cognitive ability to do programming. No aggressive or violent behavior or recent HX in the last 2 yrs.  must be primary). Pt not appropriate d/t acuity  [facility]: @ cap per website   [facility]: @ cap per website   [facility]: Posting 2 beds (Covid neg. Voluntary only. Mixed unit. No aggressive or violent behavior. No registered sex offenders). Meets exclusionary d/t involuntary status/Revocation of PD  [facility]:  Posting 3 beds (No hx of aggression/assault. No lines, drains or tubes. Does not provide detox or CD treatment). Currently reviewing another referral   [facility]: Posting 10 beds. [place]. [place] discharge being revoked; cannot be placed in [place]  [facility]: Posting 1 bed (Mixed unit/Low acuity). Per @ 03:2, cannot take any patients w/ aggression      Pt remains on work list until appropriate placement is available

## 2023-11-04 NOTE — ED NOTES
Took over at 3 am, pt has been awake sitting on the chair talking to sitter, at around 345 am, RN noticed pt pacing in the room, appears restless and anxious. Pt was given Lorazepam oral, after several minutes, pt calm down, watch TV. Pt started pacing around 630 am, pt requested Zyprexa oral.

## 2023-11-04 NOTE — TELEPHONE ENCOUNTER
R:  No beds available within [facility].    Bed search initiated @ 9am    [facility] is posting 0 beds.          [facility] is posting 0 beds. [phone].   [facility] is posting 0 beds. Negative covid required.                [facility] is posting 0 bed. Negative covid required. [phone]  [facility] is posting 0 bed. [phone]  [facility] is posting 0 beds. [phone].   [facility] is posting 1 bed for Young Adults age 18-26. Call for details. Negative covid.  [phone].  [facility] is posting 0 beds          [facility] is posting 0 beds.    [facility] is posting 0 beds.   LOW acuity ONLY. Mixed unit 12+. Negative covid- [phone].    [facility] is posting 0 beds. Negative covid. [phone]   [facility]) is posting 0 Beds. Low acuity only. Negative covid.  [phone].   [facility] ([place]) is posting 0 bed. available. Negative covid.  [phone].      [facility] is posting 0 beds. Low acuity. 72 HH hold preferred. Negative covid required. [phone].   [facility]) is posting 0 beds. Low acuity only. Negative covid.  [phone].        [facility] is posting  4 beds. Negative covid required.  (Must have the cognitive ability to do programming. No aggressive or violent behavior or recent HX in the last 2 yrs. MH must be primary.) Always low acuity. [phone]  -   WILLING TO REVIEW:   Faxed info , but faxes werent going through.  Refaxed in 3 parts @ 11am.  Awaiting review yun Boyd faxes are not going through.  Intake called IT.  IT [id number]        IT ticket was escalated and called intake writer on teams @ 12:58pm.  After IT investigated,  The fax problem is on srinivasa Boyd side accepting and receiving.      Intake called Srinivasa Yeager @ 1:04pm -  They have no email to email fax for review.   Referral cancelled.       Intake paged Dr Wu @ 2:20pm- the on call provider to review for unit 30/[place].  Dr Wu messaged intake @ 2:27pm and pt would need an SIO.   Unit 30 too acute for addtl SIOS per ANS

## 2023-11-05 ENCOUNTER — TELEPHONE (OUTPATIENT)
Dept: BEHAVIORAL HEALTH | Facility: CLINIC | Age: 33
End: 2023-11-05
Payer: MEDICARE

## 2023-11-05 LAB — GLUCOSE BLDC GLUCOMTR-MCNC: 112 MG/DL (ref 70–99)

## 2023-11-05 PROCEDURE — 250N000013 HC RX MED GY IP 250 OP 250 PS 637: Performed by: EMERGENCY MEDICINE

## 2023-11-05 PROCEDURE — 82962 GLUCOSE BLOOD TEST: CPT

## 2023-11-05 PROCEDURE — A9270 NON-COVERED ITEM OR SERVICE: HCPCS | Performed by: EMERGENCY MEDICINE

## 2023-11-05 RX ORDER — ACETAMINOPHEN 325 MG/1
650 TABLET ORAL EVERY 4 HOURS PRN
Status: DISCONTINUED | OUTPATIENT
Start: 2023-11-05 | End: 2023-11-16

## 2023-11-05 RX ORDER — LORAZEPAM 1 MG/1
1 TABLET ORAL ONCE
Status: COMPLETED | OUTPATIENT
Start: 2023-11-05 | End: 2023-11-05

## 2023-11-05 RX ADMIN — AMLODIPINE BESYLATE 10 MG: 5 TABLET ORAL at 07:44

## 2023-11-05 RX ADMIN — ROSUVASTATIN 40 MG: 20 TABLET, FILM COATED ORAL at 07:47

## 2023-11-05 RX ADMIN — INSULIN GLARGINE 25 UNITS: 100 INJECTION, SOLUTION SUBCUTANEOUS at 07:37

## 2023-11-05 RX ADMIN — NICOTINE POLACRILEX 4 MG: 4 GUM, CHEWING BUCCAL at 18:37

## 2023-11-05 RX ADMIN — NICOTINE POLACRILEX 4 MG: 4 GUM, CHEWING BUCCAL at 14:41

## 2023-11-05 RX ADMIN — NICOTINE POLACRILEX 4 MG: 4 GUM, CHEWING BUCCAL at 08:24

## 2023-11-05 RX ADMIN — GABAPENTIN 1200 MG: 300 CAPSULE ORAL at 20:30

## 2023-11-05 RX ADMIN — QUETIAPINE 300 MG: 100 TABLET ORAL at 20:33

## 2023-11-05 RX ADMIN — ACETAMINOPHEN 650 MG: 325 TABLET, FILM COATED ORAL at 12:37

## 2023-11-05 RX ADMIN — LITHIUM CARBONATE 900 MG: 450 TABLET, EXTENDED RELEASE ORAL at 20:32

## 2023-11-05 RX ADMIN — OLANZAPINE 10 MG: 10 TABLET, ORALLY DISINTEGRATING ORAL at 13:32

## 2023-11-05 RX ADMIN — NICOTINE POLACRILEX 4 MG: 4 GUM, CHEWING BUCCAL at 06:27

## 2023-11-05 RX ADMIN — TESTOSTERONE 50 MG OF TESTOSTERONE: 50 GEL TRANSDERMAL at 07:42

## 2023-11-05 RX ADMIN — LORAZEPAM 1 MG: 1 TABLET ORAL at 09:17

## 2023-11-05 RX ADMIN — LORAZEPAM 1 MG: 1 TABLET ORAL at 18:59

## 2023-11-05 RX ADMIN — GABAPENTIN 1200 MG: 300 CAPSULE ORAL at 07:39

## 2023-11-05 RX ADMIN — NICOTINE POLACRILEX 4 MG: 4 GUM, CHEWING BUCCAL at 10:57

## 2023-11-05 RX ADMIN — ACETAMINOPHEN 650 MG: 325 TABLET, FILM COATED ORAL at 17:24

## 2023-11-05 RX ADMIN — PANTOPRAZOLE SODIUM 40 MG: 40 TABLET, DELAYED RELEASE ORAL at 07:41

## 2023-11-05 RX ADMIN — GABAPENTIN 1200 MG: 300 CAPSULE ORAL at 13:32

## 2023-11-05 RX ADMIN — EMPAGLIFLOZIN 10 MG: 10 TABLET, FILM COATED ORAL at 07:48

## 2023-11-05 RX ADMIN — NICOTINE POLACRILEX 4 MG: 4 GUM, CHEWING BUCCAL at 13:32

## 2023-11-05 ASSESSMENT — ACTIVITIES OF DAILY LIVING (ADL)
ADLS_ACUITY_SCORE: 37

## 2023-11-05 NOTE — ED NOTES
Emergency Department Patient Sign-out       Brief HPI:  This is a 33 year old adult signed out to me by Dr. Ascencio .  See initial ED Provider note for details of the presentation.          Patient is medically cleared for admission to a Behavioral Health unit.          The patient has not required medication for agitation during my shift    Awaiting Mental Health Bed and Max from extended stay will be evaluating patient and will bring any further recommendations            Exam:   Patient Vitals for the past 24 hrs:   BP Temp Temp src Resp SpO2   11/05/23 0744 (!) 150/103 -- -- 18 97 %   11/05/23 0600 (!) 159/102 97.9  F (36.6  C) Oral 18 97 %   11/04/23 1953 121/81 97.6  F (36.4  C) Oral 16 94 %     General: Alert, sitting on the bed in NAD  Neuro: awake alert moving extremities x 4 face symmetrical, PEREZ equally  Head: atraumatic  Eyes: palpebral conjunctiva normal - not pale  Mouth/Throat: mucous membranes moist  Chest/Pulm: breathing comfortably  Cardiovascular: regular rate  Skin: non diaphoretic  warm and dry        ED RESULTS:   No results found for this visit on 11/02/23 (from the past 24 hour(s)).    ED MEDICATIONS:   Medications   lithium (ESKALITH CR/LITHOBID) CR tablet 900 mg ( Oral Canceled Entry 11/4/23 2158)   amLODIPine (NORVASC) tablet 10 mg (10 mg Oral $Given 11/5/23 0744)   QUEtiapine (SEROquel) tablet 300 mg ( Oral Canceled Entry 11/4/23 2200)   QUEtiapine (SEROquel) tablet 100 mg (100 mg Oral Not Given 11/5/23 1215)   LORazepam (ATIVAN) tablet 1 mg (1 mg Oral $Given 11/5/23 0917)   nicotine polacrilex (NICORETTE) gum 4 mg (4 mg Buccal $Given 11/5/23 1332)   OLANZapine zydis (zyPREXA) ODT tab 10 mg (10 mg Oral $Given 11/5/23 1332)   gabapentin (NEURONTIN) capsule 1,200 mg (1,200 mg Oral $Given 11/5/23 1332)   insulin glargine (LANTUS PEN) injection 25 Units (25 Units Subcutaneous $Given 11/5/23 0737)   empagliflozin (JARDIANCE) tablet 10 mg (10 mg Oral $Given 11/5/23 8045)   pantoprazole  (PROTONIX) EC tablet 40 mg (40 mg Oral $Given 11/5/23 0741)   rosuvastatin (CRESTOR) tablet 40 mg (40 mg Oral $Given 11/5/23 0747)   testosterone (ANDROGEL/TESTIM) 50 MG/5GM (1%) topical gel 50 mg of testosterone (50 mg of testosterone Transdermal $Given 11/5/23 0742)   acetaminophen (TYLENOL) tablet 650 mg (650 mg Oral $Given 11/5/23 1237)   OLANZapine (zyPREXA) injection 5 mg (5 mg Intramuscular $Given 11/2/23 2210)   OLANZapine (zyPREXA) injection 5 mg (5 mg Intramuscular $Given 11/2/23 2327)   acetaminophen (TYLENOL) tablet 1,000 mg (1,000 mg Oral $Given 11/3/23 0027)   nicotine polacrilex (NICORETTE) gum 4 mg (4 mg Buccal $Given 11/3/23 0027)   ketorolac (TORADOL) injection 30 mg (30 mg Intramuscular $Given 11/3/23 0035)   OLANZapine (zyPREXA) injection 5 mg (5 mg Intramuscular $Given 11/3/23 0118)   melatonin tablet 5 mg (5 mg Oral $Given 11/3/23 0118)   OLANZapine zydis (zyPREXA) ODT tab 10 mg (10 mg Oral $Given 11/3/23 1430)   LORazepam (ATIVAN) tablet 1 mg (1 mg Oral $Given 11/3/23 1701)   hydrOXYzine (ATARAX) tablet 50 mg (50 mg Oral $Given 11/3/23 1701)   ondansetron (ZOFRAN ODT) ODT tab 8 mg (8 mg Oral $Given 11/3/23 1701)   OLANZapine zydis (zyPREXA) ODT tab 10 mg (10 mg Oral $Given 11/4/23 0646)   empagliflozin (JARDIANCE) tablet 10 mg (10 mg Oral $Given 11/4/23 0826)   insulin glargine (LANTUS PEN) injection 25 Units (25 Units Subcutaneous $Given 11/4/23 0827)   QUEtiapine (SEROquel) tablet 50 mg (50 mg Oral $Given 11/4/23 1527)   ketorolac (TORADOL) injection 15 mg (15 mg Intramuscular $Given 11/4/23 1617)   diphenhydrAMINE (BENADRYL) capsule 25 mg (25 mg Oral $Given 11/4/23 1056)         Impression:    ICD-10-CM    1. Agitation  R45.1       2. Overdose ativan    Plan:    Awaiting repeat extended stay evaluation by MD Indu Jarquin Kara, MD  11/05/23 3847

## 2023-11-05 NOTE — PROGRESS NOTES
"Triage & Transition Services, Extended Care     Therapy Progress Note & Reassessment    Patient: Prakash goes by \"Prakash,\" uses he/him pronouns  Date of Service: November 5, 2023  Site of Service: [facility]   Patient was seen in-person.     Presenting problem:   Prakash is followed related to Long wait time for admission:   . Please see initial [facility] Crisis Assessment completed by Rosalia Foley on 11/3/23 for complete assessment information. Notable concerns include Paranoia, Anxiety, Significant behavioral change .     Individuals Present: Prakash & Gopal Sidhu & Yue Ludwig  Session start: 11:27 am  Session end: 11:50 am  Session duration in minutes: 23 minutes  Session number: 1  Anticipated number of sessions or this episode of care: 1  CPT utilized: 80630 - Psychotherapy (with patient) - 30 (16-37*) min    Current Presentation:   Pt presents with symptoms of haven including pressured speech, excess energy, rapid thinking, poor concentration, impulsivity, irritability, and elevated mood/euphoria. Pt also endorses experiencing intrusive thoughts and auditory command hallucinations. Pt reports wanting stabilization of manic symptoms and is concerned about depressive symptoms occurring after manic episode. This writer and pt discussed management of symptoms with medications, coping skills, and lifestyle. Pt reports refusing Seroquel morning dose due to mind fog and fatigue but does take med at night and reports it helps with sleeping. Pt reported coping skills of music, deep breathing, art, communication, and pacing/movement to help manage symptoms. Pt reports recent housing stressors have contributed to mental health symptoms but is hopeful  is resolving issue. Pt reports wanting continued stabilization and would benefit from therapeutic support of inpatient setting.     Mental Status Exam:   Appearance: awake, alert  Attitude: cooperative  Eye Contact: good  Mood:  elevated  Affect: mood " congruent  Speech: pressured speech  Psychomotor Behavior: fidgeting  Thought Process:  linear  Associations: no loose associations  Thought Content: no evidence of suicidal ideation or homicidal ideation  Insight: fair  Judgement: fair  Oriented to: time, person, and place  Attention Span and Concentration: limited  Recent and Remote Memory: fair    Diagnosis:   Bipolar affective disorder, mixed, severe, with psychotic behavior (H) F31.64       Therapeutic Intervention(s):   Provided active listening, unconditional positive regard, and validation. Engaged in safety planning.  Identified and practiced coping skills. Engaged in relaxation training (e.g. meditation, progressive muscle relaxation, etc.).    Treatment Objective(s) Addressed:   The focus of this session was on assessing safety and identifying treatment goals.     Progress Towards Goals:   Patient reports  the same  symptoms. Patient is making progress towards treatment goals as evidenced by engagement in safety planning and cooperation in treatment recommendations.       General Recommendations:   Continue to monitor for harm. Consider: Use a positive, direct and calm approach. Pt's tend to match the energy/mood of the staff. Keep focus positive and upbeat and Provide methods of distraction such as games, music, cards, art    Plan:   Inpatient Mental Health: Pt is recommended for inpatient due to acuity of manic symptoms, hallucinations, and intrusive thoughts. Pt wants stabilization for current mental health symptoms. Pt would benefit from therapeutic supports of inpatient setting.    Plan for Care reviewed with Assigned Medical Provider? Yes. Provider, Dr. Rodriguez, response: agreed     Gopal Sidhu   Licensed Mental Health Professional (LMHP), [facility]  [phone]

## 2023-11-05 NOTE — TELEPHONE ENCOUNTER
No appropriate beds are currently available within the  system. Bed search update (Statewide) @ 12:30AM:          [facility]: @ cap per website   [facility]: @ cap per website   [facility]: @ cap per website   [facility]: @ cap per website   [facility]: @ cap per website   [facility]: Posting 5 beds (Young Adult unit: No recent aggressions, violence or sexual assault. Neg covid required). Pt does not meet age requirement      [facility]: @ cap per website   [facility]: @ cap per website   [facility]: @ cap per website   [facility]: @ cap per website  [facility]: Posting 1 bed. Per Linh @ 00:30, no beds call back after 5AM  [facility]: @ cap per website   [facility]: Posting 1 bed. Per Linh @ 00:30, no beds call back after 5AM   [facility]: Does not review after 10PM.     [facility]: Posting beds in [place] and [place]. Per Sergio @ 01:45 - No beds in [place]; [place] has 1 low acuity bed; [place] has 1 low acuity bed  [facility]: Posting 5 beds (No hx of aggression. No sexual offenders. Voluntary patients only). Meets exclusionary d/t involuntary status/Revocation of PD   [facility]: Posting 5 beds (Always low acuity only. Must have the cognitive ability to do programming. No aggressive or violent behavior or recent HX in the last 2 yrs. MH must be primary).Pt not appropriate d/t acuity    [facility]: @ cap per website   [facility]: @ cap per   [facility]: Posting 2 beds (Covid neg. Voluntary only. Mixed unit. No aggressive or violent behavior. No registered sex offenders). Meets exclusionary d/t involuntary status/Revocation of PD   [facility]: @ cap per website  [facility]: Posting 4 beds. Facility is in [place]. Pt provisional discharge being revoked; cannot be placed in [place]   [facility]: Posting 1 bed (Mixed unit/Low acuity).  Per call @ 01:47, referral RN stepped  away from the desk and will call back if high acuity beds are avail        Pt remains on work list until appropriate placement is available

## 2023-11-05 NOTE — TELEPHONE ENCOUNTER
R: [place]  Access Inpatient Bed Call Log 11/5/2023 @ 1:16 pm:   Intake can search the STATEWIDE for placement.                                             [facility] is posting 0 beds.                       [facility] is posting 0 beds. [phone].               [facility] is posting 0 beds. Negative covid required.                             [facility] is posting 0 bed. Negative covid required. [phone]               [facility] is posting 0 bed. [phone]               [facility] is posting 0 beds. [phone].                [facility] is posting 5 beds for Young Adults ages 18-26. Negative covid.  Pt not appropriate              [facility] is posting 0 beds                       [facility] is posting 0 beds.               [facility] is posting 0 beds.   LOW acuity ONLY. Mixed unit 12+. Negative covid- [phone].                 [facility] is posting 1 bed.  No aggression. Negative Covid. Low acuity. Pt not appropriate                 [facility] is posting 0 beds. Negative covid. [phone]               [facility] is posting 1 bed. Low acuity only. Negative covid.  Pt not appropriate              [facility] is posting 1 bed. Low acuity. No current aggression.   Pt not appropriate              [facility] ([place] is posting 0 beds. Negative covid.  [phone].                   [facility] is posting 0 beds. Low acuity. 72 HH hold preferred. Negative covid required. [phone].               [facility] ([place]) is posting 0 beds. Low acuity only. Negative covid.  [phone].                [facility] [place] is posting 4 beds.  Negative covid required.   Vol only, No history of aggression, violence, or assault. No sexual offenders. No 72 HH holds. [phone].               [facility] is posting 5 beds.  Negative covid required.  (Must have the cognitive ability to do programming. No aggressive or violent behavior or recent HX in the last 2 yrs. MH must be primary.) Always low acuity. [phone]  Pt not appropriate              [facility] has 0 beds posted. Negative covid required.  Low acuity only. Violence and aggression capped.  [phone]  Pt not appropriate              [facility] is posting 0 beds. Low acuity, Negative covid required. [phone]; per call at 7:17 am to Irene, they are at cap but we can call back to have pt's name added to a wait list if desired.                [facility] is posting 2 beds. Unit is a combined unit (14+). No aggressive patients. Voluntary only. Must be accompanied by a guardian.  Negative covid. [phone].Pt not appropriate               [facility] is posting 3 beds. Negative covid required.  [phone]                [facility] [place] is posting 0 beds. Negative covid. LOW acuity. (No lines, drains, or tubes, oxygen, CPAP, IV, etc.). [phone]              [facility] is posting 4 beds. No covid test required.  [phone]; per call at 7:26 am to Que, they have 7 kids beds but no adult beds.                [facility] ([facility] is posting 7 beds. Negative covid. (No. lines, drains, or tubes, oxygen, CPAP, IV, etc.). [phone]        Pt remains on the work list pending appropriate bed availability.

## 2023-11-05 NOTE — TELEPHONE ENCOUNTER
R: [place]  Access Inpatient Bed Call Log 11/5/2023 @ 3:57 PM  Intake has called facilities that have not updated the bed status within the last 12 hours.                             [facility] is posting 0 beds.  11/4: Declined for 30 as Needs SIO for self harm  [facility] is posting 0 beds. [phone].   [facility] is posting 0 beds. Negative covid required.                [facility] is posting 0 bed. Negative covid required. [phone];    [facility] is posting 0 bed. [phone]  [facility] is posting 0 beds. [phone].   [facility] is posting 3 beds for Young Adults ages 18-26. Pt not age appropriate. Call for details. Negative covid.  [phone].   [facility] is posting 0 beds          [facility] ([facility]) is posting 0 beds.    [facility] is posting 0 beds.   LOW acuity ONLY. Mixed unit 12+. Negative covid- [phone].    [facility] is posting 1 bed.  No aggression. Negative Covid. Low acuity.   Pt not appropriate.   [facility] is posting 0 beds. Negative covid. [phone]   [facility] ([place]) is posting 0 beds. Low acuity only. Negative covid.  [phone].  [facility] is posting 1 bed. Low acuity. No current aggression.  Pt not appropriate.   [facility] ([place]) is posting 0 beds. Negative covid.  [phone].      [facility] is posting 0 beds. Low acuity. 72 HH hold preferred. Negative covid required. [phone].   [facility] ([place]) is posting 0 beds. Low acuity only. Negative covid.  [phone].   [facility] in [place] is posting 4 beds.  Negative covid required.   Vol only, No history of aggression, violence, or assault. No sexual offenders. No 72 HH holds. [phone].  Pt not appropriate.   [facility] is posting 5 beds. Negative covid required.  (Must have the cognitive ability to do programming. No aggressive or  violent behavior or recent HX in the last 2 yrs. MH must be primary.) Always low acuity. [phone] Pt not appropriate.   [facility] has 0 beds posted. Negative covid required.  Low acuity only. Violence and aggression capped.  [phone]   [facility] is posting 0 beds. Low acuity, Negative covid required. [phone];   [facility] is posting 2 beds. Unit is a combined unit (14+). No aggressive patients. Voluntary only. Must be accompanied by a guardian.  Negative covid. [phone].  Pt not appropriate.   [facility] is posting 3 beds. Negative covid required.  [phone]   [facility] [place] is posting 0 beds. Negative covid. LOW acuity. (No lines, drains, or tubes, oxygen, CPAP, IV, etc.). [phone];   [facility] ([facility]) is posting 7 beds. Negative covid. (No. lines, drains, or tubes, oxygen, CPAP, IV, etc.). [phone]      Pt remains on the work list pending appropriate bed availability.

## 2023-11-06 ENCOUNTER — TELEPHONE (OUTPATIENT)
Dept: BEHAVIORAL HEALTH | Facility: CLINIC | Age: 33
End: 2023-11-06
Payer: MEDICARE

## 2023-11-06 LAB
GLUCOSE BLDC GLUCOMTR-MCNC: 125 MG/DL (ref 70–99)
GLUCOSE BLDC GLUCOMTR-MCNC: 144 MG/DL (ref 70–99)
GLUCOSE BLDC GLUCOMTR-MCNC: 181 MG/DL (ref 70–99)

## 2023-11-06 PROCEDURE — 250N000013 HC RX MED GY IP 250 OP 250 PS 637: Performed by: EMERGENCY MEDICINE

## 2023-11-06 PROCEDURE — 82962 GLUCOSE BLOOD TEST: CPT

## 2023-11-06 PROCEDURE — A9270 NON-COVERED ITEM OR SERVICE: HCPCS | Performed by: EMERGENCY MEDICINE

## 2023-11-06 PROCEDURE — 250N000013 HC RX MED GY IP 250 OP 250 PS 637: Performed by: PSYCHIATRY & NEUROLOGY

## 2023-11-06 RX ORDER — QUETIAPINE FUMARATE 200 MG/1
400 TABLET, FILM COATED ORAL AT BEDTIME
Status: DISCONTINUED | OUTPATIENT
Start: 2023-11-06 | End: 2023-11-09

## 2023-11-06 RX ORDER — OLANZAPINE 10 MG/1
10 TABLET, ORALLY DISINTEGRATING ORAL
Status: DISCONTINUED | OUTPATIENT
Start: 2023-11-06 | End: 2023-11-09

## 2023-11-06 RX ORDER — CLONAZEPAM 1 MG/1
1 TABLET ORAL 2 TIMES DAILY
Status: DISCONTINUED | OUTPATIENT
Start: 2023-11-06 | End: 2023-11-13

## 2023-11-06 RX ADMIN — ROSUVASTATIN 40 MG: 20 TABLET, FILM COATED ORAL at 07:39

## 2023-11-06 RX ADMIN — QUETIAPINE FUMARATE 100 MG: 100 TABLET ORAL at 07:38

## 2023-11-06 RX ADMIN — PANTOPRAZOLE SODIUM 40 MG: 40 TABLET, DELAYED RELEASE ORAL at 07:39

## 2023-11-06 RX ADMIN — LITHIUM CARBONATE 900 MG: 450 TABLET, EXTENDED RELEASE ORAL at 21:13

## 2023-11-06 RX ADMIN — ACETAMINOPHEN 650 MG: 325 TABLET, FILM COATED ORAL at 09:16

## 2023-11-06 RX ADMIN — ACETAMINOPHEN 650 MG: 325 TABLET, FILM COATED ORAL at 21:35

## 2023-11-06 RX ADMIN — NICOTINE POLACRILEX 4 MG: 4 GUM, CHEWING BUCCAL at 09:16

## 2023-11-06 RX ADMIN — INSULIN GLARGINE 25 UNITS: 100 INJECTION, SOLUTION SUBCUTANEOUS at 07:39

## 2023-11-06 RX ADMIN — NICOTINE POLACRILEX 4 MG: 4 GUM, CHEWING BUCCAL at 20:03

## 2023-11-06 RX ADMIN — NICOTINE POLACRILEX 4 MG: 4 GUM, CHEWING BUCCAL at 17:58

## 2023-11-06 RX ADMIN — EMPAGLIFLOZIN 10 MG: 10 TABLET, FILM COATED ORAL at 07:38

## 2023-11-06 RX ADMIN — NICOTINE POLACRILEX 4 MG: 4 GUM, CHEWING BUCCAL at 07:39

## 2023-11-06 RX ADMIN — GABAPENTIN 1200 MG: 300 CAPSULE ORAL at 15:17

## 2023-11-06 RX ADMIN — NICOTINE POLACRILEX 4 MG: 4 GUM, CHEWING BUCCAL at 10:59

## 2023-11-06 RX ADMIN — ACETAMINOPHEN 650 MG: 325 TABLET, FILM COATED ORAL at 02:18

## 2023-11-06 RX ADMIN — AMLODIPINE BESYLATE 10 MG: 5 TABLET ORAL at 07:39

## 2023-11-06 RX ADMIN — CLONAZEPAM 1 MG: 1 TABLET ORAL at 20:03

## 2023-11-06 RX ADMIN — OLANZAPINE 10 MG: 10 TABLET, ORALLY DISINTEGRATING ORAL at 17:58

## 2023-11-06 RX ADMIN — NICOTINE POLACRILEX 4 MG: 4 GUM, CHEWING BUCCAL at 21:35

## 2023-11-06 RX ADMIN — NICOTINE POLACRILEX 4 MG: 4 GUM, CHEWING BUCCAL at 05:35

## 2023-11-06 RX ADMIN — GABAPENTIN 1200 MG: 300 CAPSULE ORAL at 07:38

## 2023-11-06 RX ADMIN — OLANZAPINE 10 MG: 10 TABLET, ORALLY DISINTEGRATING ORAL at 09:17

## 2023-11-06 RX ADMIN — LORAZEPAM 1 MG: 1 TABLET ORAL at 10:52

## 2023-11-06 RX ADMIN — TESTOSTERONE 50 MG OF TESTOSTERONE: 50 GEL TRANSDERMAL at 11:10

## 2023-11-06 RX ADMIN — NICOTINE POLACRILEX 4 MG: 4 GUM, CHEWING BUCCAL at 15:15

## 2023-11-06 RX ADMIN — QUETIAPINE FUMARATE 400 MG: 200 TABLET ORAL at 21:13

## 2023-11-06 ASSESSMENT — ACTIVITIES OF DAILY LIVING (ADL)
ADLS_ACUITY_SCORE: 37

## 2023-11-06 NOTE — ED PROVIDER NOTES
Continues to be awaiting mental health admission, boarding in emergency department.  Discussing symptomatology and outpatient situation with extended care.      Physical exam: Ambulatory, breathing comfortably, organized enough to give history to staff    Assessment and plan  No acute changes.  Still pending admission for disorganized thought and haven         Rodri Hill MD  11/06/23 3999

## 2023-11-06 NOTE — TELEPHONE ENCOUNTER
[place]: [place]  Access Inpatient Bed Call Log 11/6/2023 @ 3:23 PM    Intake has called facilities that have not updated the bed status within the last 12 hours.                               PT NEEDS PRIVATE BED WITHIN [facility] is posting 0 beds.           [facility] is posting 0 beds. [phone].  [facility] is posting 0 beds. Negative covid required.                 [facility] is posting 0 bed. Negative covid required. [phone];    [facility] is posting 0 bed. [phone]   [facility] is posting 0 beds. [phone].    [facility] is posting 2 beds for Young Adults ages 18-26. Pt is not age appropriate. Call for details. Negative covid.  [phone].   [facility] is posting 0 beds           [facility] ([facility]) is posting 0 beds.       [facility] is posting 2 beds.   LOW acuity ONLY. Mixed unit 12+. Negative covid- [phone].     [facility] is posting 0 beds  No aggression. Negative Covid. Low acuity Pt not appropriate.      [facility] is posting 0 beds. Negative covid. [phone]    [facility] ([place]) is posting 0 beds. Low acuity only. Negative covid.  [phone]. Per call at 7:20 am to Irene, they are at cap.    [facility] is posting 0 beds. Low acuity. No current aggression.     [facility] ([place]) is posting  0 beds. Negative covid.  [phone]; per call at 7:20 am to Irene, they are at cap.   [facility] is posting 1 bed. Low acuity. 72 HH hold preferred. Negative covid required. [phone]. Per call at @ 5:35 PM at cap with 5 on the list   [facility] ([place]) is posting 0 beds. Low acuity only. Negative covid.  [phone]. Per call at 7:20 am to Irene, they are at cap.   [facility] in [place] is posting 2 beds.  Negative covid required.   Vol only, No history of aggression, violence, or assault. No sexual offenders. No 72 HH holds.  [phone].   Pt not appropriate.       [facility] is posting 4 beds. Negative covid required.  (Must have the cognitive ability to do programming. No aggressive or violent behavior or recent HX in the last 2 yrs. MH must be primary.) Always low acuity. [phone]  Pt not appropriate  [facility] has 0 beds posted. Negative covid required.  Low acuity only. Violence and aggression capped.  [phone]    [facility] is posting 0 beds. Low acuity, Negative covid required. [phone]; per call at 7:29 am to Irene, she said she will check and will call back.    [facility] is posting 2 beds. Unit is a combined unit (14+). No aggressive patients. Voluntary only. Must be accompanied by a guardian.  Negative covid. [phone].Pt not appropriate  [facility] is posting 3 beds. Negative covid required.  [phone]; Irene left a  around 6:30 am asking for a call back re: bed avail. Per Irene, she called at 7:39 am and they have 1 step-down/low acuity bed avail. Per call with Sergio @3:21 no beds available   [facility]. Negative covid. LOW acuity. (No lines, drains, or tubes, oxygen, CPAP, IV, etc.). [phone];   [facility] ([facility]) is posting 6 beds. Negative covid. (No. lines, drains, or tubes, oxygen, CPAP, IV, etc.). [phone] DECLINED DUE TO UNIT ACUITY 11/6        Pt remains on the work list pending appropriate bed availability.

## 2023-11-06 NOTE — TELEPHONE ENCOUNTER
[place]: [place]  Access Inpatient Bed Call Log 11/6/2023 @ 7:11 am:    Intake has called facilities that have not updated the bed status within the last 12 hours.                               [facility] is posting 0 beds.           [facility] is posting 0 beds. [phone]. Per call at 7:12 to [place], they are at cap.    [facility] is posting 0 beds. Negative covid required.                 [facility] is posting 0 bed. Negative covid required. [phone];  per call at 7:13 am to Lissett, she will ask RN to call us back after her meeting.    [facility] is posting 0 bed. [phone]   [facility] is posting 0 beds. [phone].    [facility] is posting 3 beds for Young Adults ages 18-26. Call for details. Negative covid.  [phone]. Pt is outside the accepted age range.   [facility] is posting 0 beds           [facility] ([facility]) is posting 0 beds.       [facility] is posting 0 beds.   LOW acuity ONLY. Mixed unit 12+. Negative covid- [phone].     [facility] is posting 0 beds  No aggression. Negative Covid. Low acuity.      [facility] is posting 0 beds. Negative covid. [phone]    [facility] ([place]) is posting 0 beds. Low acuity only. Negative covid.  [phone]. Per call at 7:20 am to Irene, they are at cap.    [facility] is posting 0 beds. Low acuity. No current aggression.     [facility] ([place]) is posting 0 beds. Negative covid.  [phone]; per call at 7:20 am to Irene, they are at cap.   [facility] is posting 1 bed. Low acuity. 72 HH hold preferred. Negative covid required. [phone]. Per call at 7:26 am to Suzi, they have 2 beds avail.  pt not appropriate for beds avail.   [facility] ([place]) is posting 0 beds. Low acuity only. Negative covid.  [phone]. Per call at 7:20 am to Irene, they are at cap.   [facility] in [place] is posting 0 beds.   Negative covid required.   Vol only, No history of aggression, violence, or assault. No sexual offenders. No 72 HH holds. [phone].         [facility] is posting 5 beds. Negative covid required.  (Must have the cognitive ability to do programming. No aggressive or violent behavior or recent HX in the last 2 yrs. MH must be primary.) Always low acuity. [phone]  pt not appropriate for beds avail.   [facility] has 0 beds posted. Negative covid required.  Low acuity only. Violence and aggression capped.  [phone]    [facility] is posting 0 beds. Low acuity, Negative covid required. [phone]; per call at 7:29 am to Irene, she said she will check and will call back.    [facility] is posting 2 beds. Unit is a combined unit (14+). No aggressive patients. Voluntary only. Must be accompanied by a guardian.  Negative covid. [phone].  per call at 7:32 am to Kaylie, they have 2 beds avail.  Pt meets exclusionary criteria due to being on a court hold.   [facility] is posting 3 beds. Negative covid required.  [phone]; Irene left a  around 6:30 am asking for a call back re: bed avail. Per Irene, she called at 7:39 am and they have 1 step-down/low acuity bed avail.  pt not appropriate for bed available.   [facility] [place] is posting 0 beds. Negative covid. LOW acuity. (No lines, drains, or tubes, oxygen, CPAP, IV, etc.). [phone]; per call at 7:38 am to Jaelyn, they have  a couple  beds avail for pts who have a ride back home.  pt not appropriate for beds avail.   [facility] is posting 2 beds. No covid test required.  [phone]; per call at 7:40 am to Callie, they have 3-4 beds avail.  [place]. Pt's provisional discharge is being revoked; cannot be placed in [place].    [facility] ([facility]) is posting 6 beds. Negative covid. (No. lines, drains, or tubes, oxygen, CPAP, IV, etc.). [phone]; per call at 8:23  am to Irene, she will ask her resource nurse to call us back. Per call at 11:27 am to [facility], they can review. Author faxed face sheet, labs and clinical at 11:46 am. Per [facility] at 3:17 pm, they are declining pt due to their unit acuity but said we can try again tomor.             Paged Mikel at 8:18 am.   Paged Mikel at 9:13 am.  Per Mikel, pt needs a private room. No private beds currently avail.   Pt remains on the work list pending appropriate bed availability.

## 2023-11-06 NOTE — ED NOTES
IP MH Referral Acuity Rating Score (RARS)    LMHP complete at referral to IP MH, with DEC; and, daily while awaiting IP MH placement. Call score to PPS.  CRITERIA SCORING   New 72 HH and Involuntary for IP MH (not adolescent) 0/1   Boarding over 24 hours 1/1   Vulnerable adult at least 55+ with multiple co morbidities; or, Patient age 11 or under 0/1   Suicide ideation without relief of precipitating factors 0/1   Current plan for suicide 0/1   Current plan for homicide 0/1   Imminent risk or actual attempt to seriously harm another without relief of factors precipitating the attempt 0/1   Severe dysfunction in daily living (ex: complete neglect for self care, extreme disruption in vegetative function, extreme deterioration in social interactions) 1/1   Recent (last 2 weeks) or current physical aggression in the ED 1/1   Restraints or seclusion episode in ED 1/1   Verbal aggression, agitation, yelling, etc., while in the ED 1/1   Active psychosis with psychomotor agitation or catatonia 1/1   Need for constant or near constant redirection (from leaving, from others, etc).  0/1   Intrusive or disruptive behaviors 1/1   TOTAL Acuity Total Score: 7

## 2023-11-06 NOTE — CONSULTS
PSYCHIATRIC CONSULTATION    Requesting Physician: Rodri Hill MD    Admission Date: 11/02/2023  Date of Service: 11/06/2023    The patient was seen, his chart reviewed, report to follow. He is now on 1:1 mainly for the risk of elopement. He has been hearing voices telling him to run.    Dx: Bipolar Disorder, manic with psychotic features                               Vs         Schizoaffective Disorder, bipolar type         AVA         Borderline Personality Disorder by history         Medication Noncompliance    Plan: Admit to Inpatient Psychiatry upon bed availability. Obtain Lithium level in AM and adjust the dose accordingly. Discontinue Gabapentin as ineffective. Start Klonopin 1 mg BID, Zyprexa 10 mg QAM and QPM and change Seroquel to 400 mg at bedtime only. Continue the rest of medications in the same doses. Continue 1:1.    Thanks,    Ismael Greenberg MD  [phone] pager

## 2023-11-06 NOTE — PROGRESS NOTES
"Triage & Transition Services, Extended Care     Therapy Progress Note    Patient: Prakash goes by \"Prakash,\" uses he/him pronouns  Date of Service: November 6, 2023  Site of Service: [facility] EMERGENCY DEPARTMENT                             ED09  Patient was seen in-person.     Presenting problem:   Prakash is followed related to Boarding Status. Please see initial DEC/[facility] Crisis Assessment completed by Rosalia Foley on 11.3.23 for complete assessment information. Notable concerns include Paranoia, Anxiety, Significant behavioral change .     Individuals Present: Prakash & Amy Jack    Session start: 10:25 am   Session end: 10:43 am   Session duration in minutes: 18  Session number: 1  Anticipated number of sessions or this episode of care: 1-3  CPT utilized: 33417 - Psychotherapy (with patient) - 30 (16-37*) min    Current Presentation:   Pt was in the hallway walking back and fourth as a coping skill to reduce anxiety symptoms. Pt reported not being medication compliant while at the group home due to not having access to medication. Pt was able to display several times of advocacy for himself. Pt reported he is not endorsing any SI/HI/VH. Pt reported depression symptoms are not present, however they are often challenged with the ability to regulate anxiety levels and stay focused to complete tasks. Pt communicated a high interest in pseudoreduction on diagnosis and would like to understand why they take the medication they do and what it helps to support. Pt would like to invest in themselves by stabilizing their mental health and chemical dependency challenges.    ** Pt reported they respond well to being redirected.   ** Appreciate positive reinforcement.   ** Would like reminders that we are keeping them safe and they do not have to listen to the voice that directs them to alope.      Mental Status Exam:   Appearance: awake, alert and adequately groomed  Attitude: cooperative  Eye Contact: " good, looking around room  Mood: anxious  Affect: anxious  Speech: clear, coherent and increased speech latency  Psychomotor Behavior: no evidence of tardive dyskinesia, dystonia, or tics  Thought Process:  logical, goal oriented, and disorganized  Associations: no loose associations  Thought Content: no evidence of suicidal ideation or homicidal ideation  Insight: fair  Judgement: fair  Oriented to: time, person, and place  Attention Span and Concentration: intact  Recent and Remote Memory: intact    Diagnosis:   Anxiety F41.9       Therapeutic Intervention(s):   Provided active listening, unconditional positive regard, and validation. Engaged in cognitive restructuring/ reframing, looked at common cognitive distortions and challenged negative thoughts.    Treatment Objective(s) Addressed:   The focus of this session was on rapport building and assessing safety .     Progress Towards Goals:   Patient reports improving symptoms. Patient is making progress towards treatment goals as evidenced by advocating for needs and .     General Recommendations:   Continue to monitor for harm. Consider: Use a positive, direct and calm approach. Pt's tend to match the energy/mood of the staff. Keep focus positive and upbeat, Listen in a neutral, non-judgmental way. Offer reassurance, and Be mindful of your nonverbal cues (body language, facial expressions)    Plan:   Inpatient Mental Health: Pt would benefit from further observation and medication stabilization.Pt continues to endorse AH and heightened anxiety. Pt is unable to engage in safety planning at time. Pt would benefit from an updated assessment of current mental health diagnosis. Pt would benefit from a psychiatry assessment to evaluate medication supports to reduce anxiety.      Plan for Care reviewed with Assigned Medical Provider? Yes. Provider, Dr. Hill, response: agreeable     Amy Jack   Licensed Mental Health Professional (LMHP), Extended  Care  [phone]

## 2023-11-06 NOTE — TELEPHONE ENCOUNTER
No appropriate beds are currently available within the  system. Bed search update (Statewide) @ 12:35AM:         [facility]: @ cap per website. Per call @ 00:36 they are reviewing patients in their ED and suggests calling back later today  [facility]: @ cap per website   [facility]: @ cap per website. Per Marta @ 00:58 call back after 8AM  [facility]: @ cap per website   [facility]: @ cap per website   [facility]: Posting 3 beds (Young Adult unit: No recent aggressions, violence or sexual assault. No psychosis. Vol only. Neg covid required). Pt does not meet age requirement      [facility]: @ cap per website   [facility]: @ cap per website   [facility]: @ cap per website   [facility]: @ cap per website. Mixed unit/Low acuity only.   [facility]: @ cap per website   [facility]: @ cap per website   [facility]: @ cap per website   Critical access hospital: Does not review after 10PM.     [facility]: @ cap for all 3 locations per website  [facility]: @ cap per website   [facility]: Posting 5 beds (Always low acuity only. Must have the cognitive ability to do programming. No aggressive or violent behavior or recent HX in the last 2 yrs. MH must be primary). Pt not appropriate d/t acuity   [facility]: @ cap per website   [facility]: @ cap per website   [facility]: Posting 2 beds (Covid neg. Voluntary only. Mixed unit. No aggressive or violent behavior. No registered sex offenders). Meets exclusionary d/t involuntary status/Revocation of PD   [facility]: @ cap per website  [facility]: Posting 2 beds. Facility [place]. Pt provisional discharge being revoked; cannot be placed in [place]   [facility]: Posting 7 beds (Mixed unit/Low acuity). Per Desi @ 03:17 no appropriate beds available        Pt remains on work list until appropriate placement is available

## 2023-11-06 NOTE — CONSULTS
"  PSYCHIATRIC CONSULTATION    I was asked to see this patient to assess her current status and advised on further management.    HISTORY OF PRESENT ILLNESS  I had reviewed the patient's chart and interviewed the patient in her room.  She was initially quite sleepy but quickly woke up and provided coherent and seemingly reliable history.   This is a 33-year-old single female to 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 with a long history of mood disorder previously diagnosed as bipolar disorder and schizoaffective disorder, history of borderline personality disorder, PTSD, ADHD, Gender Dysphoria,  polysubstance abuse, TBI, Cauda Equina syndrome with neurogenic bladder, nephrolithiasis, diabetes mellitus type 2 and hypertension who was brought to our emergency department via ambulance after she took an overdose with Ativan (about 31 mg tablets) to help her anxiety.  He adamantly denied that this was an attempt to kill herself.  He has a history of multiple psychiatric admissions with his last admission here in 2023 under the care of Dr. Mendoza.  He was discharged from station 12 on 2023 to a group home, [facility].    It should be noted that the patient was committed through the [facility] on 2023 and her discharge was provisional.  The commitment will  on 2024.    Apparently the patient had stopped taking all her medications about a month and a half ago because \"they were not helpful\".  Her psychotropic medications upon discharge consisted of:  Eskalith 900 mg nightly  Melatonin 10 mg nightly  Stelazine 2 mg twice daily   BuSpar 15 mg 3 times daily  Klonopin 0.5 mg daily  Gabapentin 1200 mg 3 times daily  Seroquel 500 mg nightly  Adderall XR 25 mg daily.    Previously, over the years the patient has been on Cymbalta, Xanax, Abilify, Lunesta, Prozac, hydroxyzine, Lamictal, Vyvanse, Latuda, Concerta, Remeron, propranolol, trazodone, Geodon, Rexulti and Prolixin.  She had never had ECT or TMS.    CHEMICAL " USE HISTORY  He does have a history of amphetamine use disorder, ecstasy use disorder, opioid use disorder cannabis use disorder.  He had a chemical dependency treatment at [facility] 7 years ago and claims to be drug and alcohol free since.  It should be noted however that he does have a history of medical marijuana.  He reports taking it for pain.    FAMILY HISTORY  Is remarkable for bipolar disorder and generalized anxiety disorder in his mother and some kind of mental illness in his maternal uncle and cousin who had attempted suicide.    PAST MEDICAL HISTORY  Is remarkable for diabetes mellitus type 2, hypertension, GERD, nephrolithiasis, Cauda Equina Syndrome and a history of TBI    ALLERGIES  He has been allergic to Haldol, adhesive tape, Percocet, prednisone, Risperdal, tramadol, droperidol and Seroquel which caused palpitation and QT prolongation.    BRIEF SOCIAL HISTORY  The patient was born in [place] [facility] and raised in [place] 1 of 3 children to his parents.  One of his brothers  of a heart attack 1 year ago and a 1/2 brother is currently 52-year-old.  She had graduated from high school and from the [employer] with BS in biology.  His work history is rather sketchy.  He is currently unemployed and receiving SSDI benefits.  The patient has never been  and has no children.  He currently lives in a group home in [facility].    MENTAL STATUS EXAMINATION  Revealed a normally built and normally developed, somewhat obese, single white 33-year-old biological female appearing his stated age.  He was alert and oriented x 3.  His speech was coherent logical and goal directed.  He presented with visible hand and leg tremor.  He denied being depressed and adamantly denied suicidal ideation or intent.  His mood is slightly elated.  He readily admitted to auditory hallucinations whereby the voices telling her to run.  He has no significant delusional interpretation for his  perceptual disturbances.  No other prominent delusions could be noted or elicited.  The patient had only marginal insight into his problems and his judgment is impaired.     DIAGNOSTIC IMPRESSION  Bipolar Disorder, manic with psychotic features                               Vs  Schizoaffective Disorder, bipolar type  AVA  Borderline Personality Disorder by history  Medication Noncompliance     Plan: Admit to Inpatient Psychiatry upon bed availability. Obtain Lithium level in AM and adjust the dose accordingly. Discontinue Gabapentin as ineffective. Start Klonopin 1 mg BID, Zyprexa 10 mg QAM and QPM and change Seroquel to 400 mg at bedtime only. Continue the rest of medications in the same doses. Continue 1:1.   to contact authorities to revoke provisional discharge.    I thank you very much for letting me participate in the care of this patient,    Ismael Greenberg MD

## 2023-11-07 ENCOUNTER — TELEPHONE (OUTPATIENT)
Dept: BEHAVIORAL HEALTH | Facility: CLINIC | Age: 33
End: 2023-11-07

## 2023-11-07 LAB
GLUCOSE BLDC GLUCOMTR-MCNC: 175 MG/DL (ref 70–99)
LITHIUM SERPL-SCNC: 0.42 MMOL/L (ref 0.6–1.2)

## 2023-11-07 PROCEDURE — 82962 GLUCOSE BLOOD TEST: CPT

## 2023-11-07 PROCEDURE — 80178 ASSAY OF LITHIUM: CPT | Performed by: PSYCHIATRY & NEUROLOGY

## 2023-11-07 PROCEDURE — 124N000002 HC R&B MH UMMC

## 2023-11-07 PROCEDURE — 250N000013 HC RX MED GY IP 250 OP 250 PS 637: Performed by: EMERGENCY MEDICINE

## 2023-11-07 PROCEDURE — 250N000013 HC RX MED GY IP 250 OP 250 PS 637

## 2023-11-07 PROCEDURE — 93005 ELECTROCARDIOGRAM TRACING: CPT

## 2023-11-07 PROCEDURE — 250N000013 HC RX MED GY IP 250 OP 250 PS 637: Performed by: STUDENT IN AN ORGANIZED HEALTH CARE EDUCATION/TRAINING PROGRAM

## 2023-11-07 PROCEDURE — 36415 COLL VENOUS BLD VENIPUNCTURE: CPT | Performed by: PSYCHIATRY & NEUROLOGY

## 2023-11-07 PROCEDURE — 250N000013 HC RX MED GY IP 250 OP 250 PS 637: Performed by: PSYCHIATRY & NEUROLOGY

## 2023-11-07 PROCEDURE — A9270 NON-COVERED ITEM OR SERVICE: HCPCS | Performed by: EMERGENCY MEDICINE

## 2023-11-07 PROCEDURE — 93010 ELECTROCARDIOGRAM REPORT: CPT | Performed by: INTERNAL MEDICINE

## 2023-11-07 RX ORDER — DOXYCYCLINE 50 MG/1
100 CAPSULE ORAL 2 TIMES DAILY
Status: DISCONTINUED | OUTPATIENT
Start: 2023-11-07 | End: 2023-11-07

## 2023-11-07 RX ORDER — LANOLIN ALCOHOL/MO/W.PET/CERES
3 CREAM (GRAM) TOPICAL
Status: DISCONTINUED | OUTPATIENT
Start: 2023-11-07 | End: 2023-11-29 | Stop reason: HOSPADM

## 2023-11-07 RX ORDER — GABAPENTIN 600 MG/1
1200 TABLET ORAL 3 TIMES DAILY
Status: CANCELLED | OUTPATIENT
Start: 2023-11-07

## 2023-11-07 RX ORDER — NICOTINE POLACRILEX 4 MG
15-30 LOZENGE BUCCAL
Status: DISCONTINUED | OUTPATIENT
Start: 2023-11-07 | End: 2023-11-07

## 2023-11-07 RX ORDER — HYDRALAZINE HYDROCHLORIDE 10 MG/1
10 TABLET, FILM COATED ORAL 4 TIMES DAILY PRN
Status: DISCONTINUED | OUTPATIENT
Start: 2023-11-07 | End: 2023-11-29 | Stop reason: HOSPADM

## 2023-11-07 RX ORDER — OLANZAPINE 10 MG/1
10 TABLET ORAL 3 TIMES DAILY PRN
Status: DISCONTINUED | OUTPATIENT
Start: 2023-11-07 | End: 2023-11-09

## 2023-11-07 RX ORDER — MAGNESIUM HYDROXIDE/ALUMINUM HYDROXICE/SIMETHICONE 120; 1200; 1200 MG/30ML; MG/30ML; MG/30ML
30 SUSPENSION ORAL EVERY 4 HOURS PRN
Status: DISCONTINUED | OUTPATIENT
Start: 2023-11-07 | End: 2023-11-29 | Stop reason: HOSPADM

## 2023-11-07 RX ORDER — NICOTINE POLACRILEX 4 MG
15-30 LOZENGE BUCCAL
Status: DISCONTINUED | OUTPATIENT
Start: 2023-11-07 | End: 2023-11-29 | Stop reason: HOSPADM

## 2023-11-07 RX ORDER — OLANZAPINE 10 MG/2ML
10 INJECTION, POWDER, FOR SOLUTION INTRAMUSCULAR 3 TIMES DAILY PRN
Status: DISCONTINUED | OUTPATIENT
Start: 2023-11-07 | End: 2023-11-09

## 2023-11-07 RX ORDER — CLONAZEPAM 1 MG/1
1 TABLET ORAL ONCE
Status: COMPLETED | OUTPATIENT
Start: 2023-11-07 | End: 2023-11-07

## 2023-11-07 RX ORDER — DEXTROSE MONOHYDRATE 25 G/50ML
25-50 INJECTION, SOLUTION INTRAVENOUS
Status: DISCONTINUED | OUTPATIENT
Start: 2023-11-07 | End: 2023-11-07

## 2023-11-07 RX ORDER — DEXTROSE MONOHYDRATE 25 G/50ML
25-50 INJECTION, SOLUTION INTRAVENOUS
Status: DISCONTINUED | OUTPATIENT
Start: 2023-11-07 | End: 2023-11-29 | Stop reason: HOSPADM

## 2023-11-07 RX ORDER — POLYETHYLENE GLYCOL 3350 17 G/17G
17 POWDER, FOR SOLUTION ORAL DAILY PRN
Status: DISCONTINUED | OUTPATIENT
Start: 2023-11-07 | End: 2023-11-08

## 2023-11-07 RX ORDER — TRETINOIN 0.5 MG/G
CREAM TOPICAL AT BEDTIME
Status: DISCONTINUED | OUTPATIENT
Start: 2023-11-07 | End: 2023-11-07

## 2023-11-07 RX ORDER — TRETINOIN 0.5 MG/G
CREAM TOPICAL AT BEDTIME
Status: DISCONTINUED | OUTPATIENT
Start: 2023-11-07 | End: 2023-11-20

## 2023-11-07 RX ORDER — OLANZAPINE 10 MG/1
10 TABLET, ORALLY DISINTEGRATING ORAL ONCE
Status: COMPLETED | OUTPATIENT
Start: 2023-11-07 | End: 2023-11-07

## 2023-11-07 RX ORDER — HYDROXYZINE HYDROCHLORIDE 25 MG/1
25 TABLET, FILM COATED ORAL EVERY 4 HOURS PRN
Status: DISCONTINUED | OUTPATIENT
Start: 2023-11-07 | End: 2023-11-08

## 2023-11-07 RX ORDER — DOXYCYCLINE 100 MG/1
100 CAPSULE ORAL EVERY 12 HOURS SCHEDULED
Status: DISCONTINUED | OUTPATIENT
Start: 2023-11-07 | End: 2023-11-29 | Stop reason: HOSPADM

## 2023-11-07 RX ORDER — GABAPENTIN 600 MG/1
1200 TABLET ORAL 3 TIMES DAILY
Status: DISCONTINUED | OUTPATIENT
Start: 2023-11-07 | End: 2023-11-29 | Stop reason: HOSPADM

## 2023-11-07 RX ADMIN — OLANZAPINE 10 MG: 10 TABLET, ORALLY DISINTEGRATING ORAL at 14:05

## 2023-11-07 RX ADMIN — QUETIAPINE FUMARATE 400 MG: 200 TABLET ORAL at 21:25

## 2023-11-07 RX ADMIN — NICOTINE POLACRILEX 4 MG: 4 GUM, CHEWING BUCCAL at 13:54

## 2023-11-07 RX ADMIN — ROSUVASTATIN 40 MG: 20 TABLET, FILM COATED ORAL at 08:26

## 2023-11-07 RX ADMIN — INSULIN GLARGINE 25 UNITS: 100 INJECTION, SOLUTION SUBCUTANEOUS at 07:55

## 2023-11-07 RX ADMIN — NICOTINE POLACRILEX 4 MG: 4 GUM, CHEWING BUCCAL at 12:53

## 2023-11-07 RX ADMIN — CLONAZEPAM 1 MG: 1 TABLET ORAL at 20:19

## 2023-11-07 RX ADMIN — CLONAZEPAM 1 MG: 1 TABLET ORAL at 07:42

## 2023-11-07 RX ADMIN — ACETAMINOPHEN 650 MG: 325 TABLET, FILM COATED ORAL at 08:26

## 2023-11-07 RX ADMIN — EMPAGLIFLOZIN 10 MG: 10 TABLET, FILM COATED ORAL at 07:54

## 2023-11-07 RX ADMIN — CLONAZEPAM 1 MG: 1 TABLET ORAL at 14:35

## 2023-11-07 RX ADMIN — NICOTINE POLACRILEX 4 MG: 4 GUM, CHEWING BUCCAL at 07:41

## 2023-11-07 RX ADMIN — AMLODIPINE BESYLATE 10 MG: 5 TABLET ORAL at 07:42

## 2023-11-07 RX ADMIN — GABAPENTIN 1200 MG: 600 TABLET, FILM COATED ORAL at 21:20

## 2023-11-07 RX ADMIN — OLANZAPINE 10 MG: 10 TABLET, ORALLY DISINTEGRATING ORAL at 20:20

## 2023-11-07 RX ADMIN — LITHIUM CARBONATE 900 MG: 450 TABLET, EXTENDED RELEASE ORAL at 21:19

## 2023-11-07 RX ADMIN — NICOTINE POLACRILEX 4 MG: 4 GUM, CHEWING BUCCAL at 11:23

## 2023-11-07 RX ADMIN — NICOTINE POLACRILEX 4 MG: 4 GUM, CHEWING BUCCAL at 16:00

## 2023-11-07 RX ADMIN — DOXYCYCLINE HYCLATE 100 MG: 100 CAPSULE ORAL at 21:29

## 2023-11-07 RX ADMIN — PANTOPRAZOLE SODIUM 40 MG: 40 TABLET, DELAYED RELEASE ORAL at 07:42

## 2023-11-07 RX ADMIN — OLANZAPINE 10 MG: 10 TABLET, ORALLY DISINTEGRATING ORAL at 07:42

## 2023-11-07 RX ADMIN — TESTOSTERONE 50 MG OF TESTOSTERONE: 50 GEL TRANSDERMAL at 07:41

## 2023-11-07 RX ADMIN — NICOTINE POLACRILEX 4 MG: 4 GUM, CHEWING BUCCAL at 21:27

## 2023-11-07 ASSESSMENT — ACTIVITIES OF DAILY LIVING (ADL)
ADLS_ACUITY_SCORE: 37
DRESS: STREET CLOTHES
ADLS_ACUITY_SCORE: 37
ADLS_ACUITY_SCORE: 57
ADLS_ACUITY_SCORE: 57
ADLS_ACUITY_SCORE: 37
ADLS_ACUITY_SCORE: 37

## 2023-11-07 NOTE — PROGRESS NOTES
"Triage & Transition Services, Extended Care     Ayana Prasad  November 7, 2023    Prakash is followed related to Long wait time for admission: since 11/02 . Please see initial DEC Crisis Assessment completed for complete assessment information. Medical record is reviewed.  While patient is in the ED, care team is working towards Learn and Demonstrate at Least One Skill Focused on Crisis Stabilization.     Pt was seen from 2:14-2:20 pm.  He reported that he is doing alright.  He described his current mood as irritated.  Pt reported that it is difficult being trapped in the room all day.  He stated that he saw psych yesterday and his meds are working okay.  Pt reported adequate sleep.  He stated that he is hearing voices all day and they say \"different things.\"  Pt denied the voices telling him to hurt self and others.  He reported poor concentration.  Pt denied suicidal and homicidal thoughts.  He denied any mental health needs.    There are not significant status changes.     After meeting with pt I got a call from his nurse.  She stated that pt needs a prn or medication change.  She stated that it is very difficult to manage pt in the current state.  I informed her that I would put in a psych consult order.    Plan:  Inpatient Mental Health: Pt's PD was revoked.  Reported symptoms include haven, hallucinations, and intrusive thoughts.  Pt requires inpatient care for safety and stabilization.    Plan for Care reviewed with Assigned Medical Provider? Yes. Provider, Dr. Morton, response: Agreement    Extended Care will follow and meet with patient/family/care team as able or requested.     Marcelle Bradford, CLAUDE  [facility], Extended Care   [phone]       "

## 2023-11-07 NOTE — ED NOTES
Pt became upset and started walking down hallway, throwing shoes and hitting equipment. Code 21 was called and quicky cancelled. Pt went back to room. RN talked to pt and pt states frustration and asked for clonopin to help calm down. Provider granted extra dose. Pt apologetic for his outburst.

## 2023-11-07 NOTE — ED NOTES
Received phone call from [facility].  Advised concerns with discontinued gabapentin,  Lithium levels, and need for prn anxiety medication.

## 2023-11-07 NOTE — TELEPHONE ENCOUNTER
10:59 AM Per call to [facility] Manjula can take name and number for referral if able to screen.  11:33 AM [facility] to [facility] due to filling their last bed.  4:02 PM Paged Dr. Wade.  4:05 PM Dr. Wade accepted pt unit 20 dbl will order SIO.  4:16 PM Indicia completed.  4:18 PM CRN informed of patient in queue unit currently down a HUC. Per call CRN will follow up with [facility] ED for report.  4:20 PM ED notified.      R: [place] MH Access Inpatient Bed Call Log  11/7/2023 12:47 AM  Intake has called facilities that have not updated their bed status within the last 12 hours.??      ADULTS:     *[facility] -- [facility]: 11/7 @ cap per website.  [place] -- [facility]: 11/7  @ cap per website.  [place] -- [facility]: 11/7 Per call at 1:18 pm currently at capacity until 11/8 at 10 am.  [place] -- [facility]: 11/7 @ cap per website.   [place] -- [facility]: 11/7 Per call at 1:18 pm currently at capacity.  [facility] -- [facility]: @ cap per website.11/7 Per call 1:17 PM to Christine no beds available.  [place] -- [facility] Posting 2 Bed. 11/7 Pt not appropriate d/t facility restrictions  [place] -- [facility]: 11/7 Per call at 1:18 pm currently at capacity until 11/8 at 10 am.  [place] -- [facility]: @ cap per website.  [place] -- [facility]: 11/7 Per call at 1:18 pm currently at capacity until 11/8 at 10 am.     *[facility]: Posting 1 bed. Mixed unit/Low acuity only. 11/7 Currently FULL.  [facility]: @ cap per website. Low acuity, No aggression 11/7 Per call at 1:18 pm currently at capacity until 11/8 at 10 am.  [facility]: @ cap per website [phone] 11/7  Per call at 11:01 AM to [place] no open beds.  [facility]: 11/7 @ cap per website. Low acuity only. No current aggression.  [facility]:  Posting 1 bed. COVID negative test req. Lower acuity only. 11/7 Per call at 1:02 PM currently on diversion.  [facility]: Posting 1 bed. Low  acuity only. Prefer med adjustments placement. 11/7 Per call at 1:02 PM currently no beds available.  [facility] [place]: 11/7  @ cap per website. No aggression 11/7 Per call at 1:02 PM currently on diversion.  [place] -- [facility]: Posting 1 bed. NO reviews after 10PM. Low acuity only.    [place] -- [facility]: Posting 3 bed. No hx of aggression. No sexual offenders. Voluntary patients only.   [place] -- [facility]:  Posting 5 beds. Low acuity only. Must have the cognitive ability to do programming. No aggressive or violent behavior or recent HX in the last 2 yrs. MH must be primary.   [place] -- [facility]: 11/7 @ Cap per website.  COVID negative test. Must be low acuity ONLY.  [place] -- [facility]: 11/7 @ cap per website. Low acuity. Negative Covid.   [place] -- [facility]: Posting 2 beds. Covid neg. Voluntary only. Mixed unit of adults & adol. No aggressive or violent behavior. No registered sex offenders.    [place] -- [facility]: 11/7 @ cap per website. COVID negative test  [place] [facility]: Posting 4 beds. No hx of aggression/assault. No lines, drains or tubes. Does not provide detox or CD treatment.   [place] -- [facility]: Posting 4 beds. Out-of-State Facility.11/7 PT not appropriate d/t [place] Commitment.  [place] -- [facility]: Posting 6 bed. Mixed unit/Low acuity/no medical devices - IV, CPAP etc. 11/6 TRF d/t unit acuity      Pt remains on waitlist pending appropriate placement availability.

## 2023-11-07 NOTE — ED NOTES
Called extended care.  Talked to Catalina  she will notify the patient's concerns with gabapentin being discontinued.

## 2023-11-07 NOTE — ED NOTES
Called ext care talked to Catalina again.  She will page Marcelle again about the patient's gabapentin

## 2023-11-07 NOTE — ED NOTES
IP MH Referral Acuity Rating Score (RARS)    LMHP complete at referral to IP MH, with DEC; and, daily while awaiting IP MH placement. Call score to PPS.  CRITERIA SCORING   New 72 HH and Involuntary for IP MH (not adolescent) 1/1   Boarding over 24 hours 1/1   Vulnerable adult at least 55+ with multiple co morbidities; or, Patient age 11 or under 0/1   Suicide ideation without relief of precipitating factors 0/1   Current plan for suicide 0/1   Current plan for homicide 0/1   Imminent risk or actual attempt to seriously harm another without relief of factors precipitating the attempt 0/1   Severe dysfunction in daily living (ex: complete neglect for self care, extreme disruption in vegetative function, extreme deterioration in social interactions) 1/1   Recent (last 2 weeks) or current physical aggression in the ED 1/1   Restraints or seclusion episode in ED 1/1   Verbal aggression, agitation, yelling, etc., while in the ED 1/1   Active psychosis with psychomotor agitation or catatonia 1/1   Need for constant or near constant redirection (from leaving, from others, etc).  0/1   Intrusive or disruptive behaviors 1/1   TOTAL Acuity Total Score: 8

## 2023-11-07 NOTE — TELEPHONE ENCOUNTER
R: [place]  Access Inpatient Bed Call Log  11/7/2023 12:47 AM  Intake has called facilities that have not updated their bed status within the last 12 hours.??      ADULTS:     *[place]  [place] -- [facility]: @ cap per website.  [place] -- [facility]:  @ cap per website.  [place] -- [facility]: @cap per website  [place] -- [facility]: @ cap per website.   [place] -- [facility]: @cap per website.  [facility] -- [facility]: @ cap per website.  [place] -- [facility] Posting 2 Bed.   [place] -- [facility]: @ cap per website.  [place] -- [facility]: @ cap per website.  [place] -- [facility]:  @ cap per website.     *[facility]: Posting 1 bed. Mixed unit/Low acuity only.   [facility]: @ cap per website. Low acuity, No aggression  [facility]: @ cap per website   [facility]:  @ cap per website. Low acuity only. No current aggression.  [facility]:  Posting 1 bed. COVID negative test req. Lower acuity only  [facility]: Posting 1 bed. Low acuity only. Prefer med adjustments placement.  [place] [facility]: @ cap per website. No aggression  [place] -- [facility]: Posting 1 bed. NO reviews after 10PM. Low acuity only.    [place] -- [facility]: Posting 3 bed. No hx of aggression. No sexual offenders. Voluntary patients only.   [place] -- [facility]:  Posting 5 beds. Low acuity only. Must have the cognitive ability to do programming. No aggressive or violent behavior or recent HX in the last 2 yrs.  must be primary.   [place] [facility]: @ Cap per website.  COVID negative test. Must be low acuity ONLY.  [place] -- [facility]: @ cap per website. Low acuity. Negative Covid.   [place] -- [facility]: Posting 2 beds. Covid neg. Voluntary only. Mixed unit of adults & adol. No aggressive or violent behavior. No registered sex offenders.    [place] -- [place]: @ cap per website. COVID  negative test  [facility]: Posting 4 beds. No hx of aggression/assault. No lines, drains or tubes. Does not provide detox or CD treatment.   [place] -- [facility]: Posting 6 bed. Mixed unit/Low acuity/no medical devices - IV, CPAP etc.- Declined due to unit acuity.      Pt remains on waitlist pending appropriate placement availability.

## 2023-11-07 NOTE — ED PROVIDER NOTES
[facility] ED Mental Health Handoff Note:       Brief HPI:  This is a 33 year old adult signed out to me by Dr. Nova.  See initial ED Provider note for full details of the presentation. Interval history is pertinent for no acute issues.  Patient did receive 1 additional dose of Zyprexa and Klonopin this evening.  Patient had some ongoing concerns about his right hand and his x-ray was reviewed.  No indication for needing repeat x-ray at this time.  Patient's been boarding in the ER for 115 hours.  Patient is awaiting inpatient mental health bed.    Home meds reviewed and ordered/administered: Yes    Medically stable for inpatient mental health admission: Yes.    Evaluated by mental health: Yes. The recommendation is for inpatient mental health treatment. Bed search in process    Safety concerns: At the time I received sign out, there were no safety concerns.    Hold Status:  Active Orders   Legal    Health Officer Authority to Detain (FAVIO)     Frequency: Effective Now     Start Date/Time: 11/02/23 2208      Number of Occurrences: Until Specified     Order Comments: This patient presented with an altered mental state that is suspected to be due to an intoxicating substance. The level of mentation is such that abuse of this substance is suspected. Given the patient's level of alteration and the circumstances in which the patient presented, it is likely that the patient utilizes intoxicating substances on a regular basis or has relapsed into utilizing them after a period of attempted sobriety. Given these circumstances, I am highly concerned that the patient has a problem with chemical dependency and cannot make safe decisions at this time. Currently, this endangers the patient's well-being and safety, and I am thus placing a Health Officer Authority hold upon the patient at this time.      Health Officer Authority to Detain (FAVIO)     Frequency: Effective Now     Start Date/Time: 11/02/23 2208      Number of  Occurrences: Until Specified     Order Comments: Disorganized, poor judgement,  possible overdose              Exam:   Patient Vitals for the past 24 hrs:   BP Temp Temp src Pulse Resp SpO2   11/07/23 0744 134/88 -- -- 100 16 --   11/06/23 1800 (!) 145/105 98.1  F (36.7  C) Oral 90 16 100 %           ED Course:    Medications   lithium (ESKALITH CR/LITHOBID) CR tablet 900 mg (900 mg Oral $Given 11/6/23 2113)   amLODIPine (NORVASC) tablet 10 mg (10 mg Oral $Given 11/7/23 0742)   nicotine polacrilex (NICORETTE) gum 4 mg (4 mg Buccal $Given 11/7/23 1354)   insulin glargine (LANTUS PEN) injection 25 Units (25 Units Subcutaneous $Given 11/7/23 0755)   empagliflozin (JARDIANCE) tablet 10 mg (10 mg Oral $Given 11/7/23 0754)   pantoprazole (PROTONIX) EC tablet 40 mg (40 mg Oral $Given 11/7/23 0742)   rosuvastatin (CRESTOR) tablet 40 mg (40 mg Oral $Given 11/7/23 0826)   testosterone (ANDROGEL/TESTIM) 50 MG/5GM (1%) topical gel 50 mg of testosterone (50 mg of testosterone Transdermal $Given 11/7/23 0741)   acetaminophen (TYLENOL) tablet 650 mg (650 mg Oral $Given 11/7/23 0826)   QUEtiapine (SEROquel) tablet 400 mg (400 mg Oral $Given 11/6/23 2113)   OLANZapine zydis (zyPREXA) ODT tab 10 mg (10 mg Oral $Given 11/7/23 0742)   clonazePAM (klonoPIN) tablet 1 mg (1 mg Oral $Given 11/7/23 0742)   OLANZapine (zyPREXA) injection 5 mg (5 mg Intramuscular $Given 11/2/23 2210)   OLANZapine (zyPREXA) injection 5 mg (5 mg Intramuscular $Given 11/2/23 2327)   acetaminophen (TYLENOL) tablet 1,000 mg (1,000 mg Oral $Given 11/3/23 0027)   nicotine polacrilex (NICORETTE) gum 4 mg (4 mg Buccal $Given 11/3/23 0027)   ketorolac (TORADOL) injection 30 mg (30 mg Intramuscular $Given 11/3/23 0035)   OLANZapine (zyPREXA) injection 5 mg (5 mg Intramuscular $Given 11/3/23 0118)   melatonin tablet 5 mg (5 mg Oral $Given 11/3/23 0118)   OLANZapine zydis (zyPREXA) ODT tab 10 mg (10 mg Oral $Given 11/3/23 1430)   LORazepam (ATIVAN) tablet 1 mg (1 mg Oral  $Given 11/3/23 1701)   hydrOXYzine (ATARAX) tablet 50 mg (50 mg Oral $Given 11/3/23 1701)   ondansetron (ZOFRAN ODT) ODT tab 8 mg (8 mg Oral $Given 11/3/23 1701)   OLANZapine zydis (zyPREXA) ODT tab 10 mg (10 mg Oral $Given 11/4/23 0646)   empagliflozin (JARDIANCE) tablet 10 mg (10 mg Oral $Given 11/4/23 0826)   insulin glargine (LANTUS PEN) injection 25 Units (25 Units Subcutaneous $Given 11/4/23 0827)   QUEtiapine (SEROquel) tablet 50 mg (50 mg Oral $Given 11/4/23 1527)   ketorolac (TORADOL) injection 15 mg (15 mg Intramuscular $Given 11/4/23 1617)   diphenhydrAMINE (BENADRYL) capsule 25 mg (25 mg Oral $Given 11/4/23 2153)   LORazepam (ATIVAN) tablet 1 mg (1 mg Oral $Given 11/5/23 1859)   OLANZapine zydis (zyPREXA) ODT tab 10 mg (10 mg Oral $Given 11/7/23 1405)   clonazePAM (klonoPIN) tablet 1 mg (1 mg Oral $Given 11/7/23 1435)            There were no significant events during my shift.    Patient was signed out to the oncoming provider, Dr. Prater      Impression:    ICD-10-CM    1. Agitation  R45.1       2. Schizophrenia, schizoaffective, chronic with acute exacerbation (H)  F25.9       3. Bipolar affective disorder, mixed, severe, with psychotic behavior (H)  F31.64 QUEtiapine (SEROquel) tablet 400 mg     OLANZapine zydis (zyPREXA) ODT tab 10 mg      4. Schizoaffective disorder, chronic condition with acute exacerbation (H)  F25.9 QUEtiapine (SEROquel) tablet 400 mg     OLANZapine zydis (zyPREXA) ODT tab 10 mg      5. AVA (generalized anxiety disorder)  F41.1 clonazePAM (klonoPIN) tablet 1 mg          Plan:    Awaiting inpatient mental health admission/transfer.      RESULTS:   Results for orders placed or performed during the hospital encounter of 11/02/23 (from the past 24 hour(s))   Glucose by meter     Status: Abnormal    Collection Time: 11/06/23  9:26 PM   Result Value Ref Range    GLUCOSE BY METER POCT 144 (H) 70 - 99 mg/dL   Glucose by meter     Status: Abnormal    Collection Time: 11/07/23  7:54  AM   Result Value Ref Range    GLUCOSE BY METER POCT 175 (H) 70 - 99 mg/dL   Lithium level     Status: Abnormal    Collection Time: 11/07/23  9:56 AM   Result Value Ref Range    Lithium 0.42 (L) 0.60 - 1.20 mmol/L             MD Praveen Lewis, Christy Almonte MD  11/07/23 1151

## 2023-11-07 NOTE — ED NOTES
Sent secure chat to provider    The patient still wants a second xray of his hand.  Can you epxlain to him why we are not able to do another xray.

## 2023-11-08 ENCOUNTER — APPOINTMENT (OUTPATIENT)
Dept: GENERAL RADIOLOGY | Facility: CLINIC | Age: 33
DRG: 885 | End: 2023-11-08
Payer: MEDICARE

## 2023-11-08 PROBLEM — M79.641 PAIN OF RIGHT HAND: Status: ACTIVE | Noted: 2023-11-08

## 2023-11-08 LAB
ALBUMIN SERPL BCG-MCNC: 4.3 G/DL (ref 3.5–5.2)
ALP SERPL-CCNC: 82 U/L (ref 35–129)
ALT SERPL W P-5'-P-CCNC: 71 U/L (ref 0–70)
ANION GAP SERPL CALCULATED.3IONS-SCNC: 7 MMOL/L (ref 7–15)
AST SERPL W P-5'-P-CCNC: 66 U/L (ref 0–45)
ATRIAL RATE - MUSE: 82 BPM
BASOPHILS # BLD AUTO: 0 10E3/UL (ref 0–0.2)
BASOPHILS NFR BLD AUTO: 1 %
BILIRUB SERPL-MCNC: 0.5 MG/DL
BUN SERPL-MCNC: 14.1 MG/DL (ref 6–20)
CALCIUM SERPL-MCNC: 9.2 MG/DL (ref 8.6–10)
CHLORIDE SERPL-SCNC: 108 MMOL/L (ref 98–107)
CHOLEST SERPL-MCNC: 113 MG/DL
CREAT SERPL-MCNC: 0.61 MG/DL (ref 0.51–1.17)
DEPRECATED HCO3 PLAS-SCNC: 24 MMOL/L (ref 22–29)
DIASTOLIC BLOOD PRESSURE - MUSE: NORMAL MMHG
EGFRCR SERPLBLD CKD-EPI 2021: >90 ML/MIN/1.73M2
EOSINOPHIL # BLD AUTO: 0.2 10E3/UL (ref 0–0.7)
EOSINOPHIL NFR BLD AUTO: 3 %
ERYTHROCYTE [DISTWIDTH] IN BLOOD BY AUTOMATED COUNT: 15.1 % (ref 10–15)
FOLATE SERPL-MCNC: 13.7 NG/ML (ref 4.6–34.8)
GLUCOSE BLDC GLUCOMTR-MCNC: 103 MG/DL (ref 70–99)
GLUCOSE BLDC GLUCOMTR-MCNC: 126 MG/DL (ref 70–99)
GLUCOSE BLDC GLUCOMTR-MCNC: 139 MG/DL (ref 70–99)
GLUCOSE SERPL-MCNC: 124 MG/DL (ref 70–99)
HCT VFR BLD AUTO: 44.7 % (ref 35–53)
HDLC SERPL-MCNC: 39 MG/DL
HGB BLD-MCNC: 14.7 G/DL (ref 11.7–17.7)
IMM GRANULOCYTES # BLD: 0 10E3/UL
IMM GRANULOCYTES NFR BLD: 0 %
INTERPRETATION ECG - MUSE: NORMAL
LDLC SERPL CALC-MCNC: 41 MG/DL
LYMPHOCYTES # BLD AUTO: 2.1 10E3/UL (ref 0.8–5.3)
LYMPHOCYTES NFR BLD AUTO: 26 %
MCH RBC QN AUTO: 28.4 PG (ref 26.5–33)
MCHC RBC AUTO-ENTMCNC: 32.9 G/DL (ref 31.5–36.5)
MCV RBC AUTO: 86 FL (ref 78–100)
MONOCYTES # BLD AUTO: 0.5 10E3/UL (ref 0–1.3)
MONOCYTES NFR BLD AUTO: 6 %
NEUTROPHILS # BLD AUTO: 5.2 10E3/UL (ref 1.6–8.3)
NEUTROPHILS NFR BLD AUTO: 64 %
NONHDLC SERPL-MCNC: 74 MG/DL
NRBC # BLD AUTO: 0 10E3/UL
NRBC BLD AUTO-RTO: 0 /100
P AXIS - MUSE: 36 DEGREES
PLATELET # BLD AUTO: 311 10E3/UL (ref 150–450)
POTASSIUM SERPL-SCNC: 4.2 MMOL/L (ref 3.4–5.3)
PR INTERVAL - MUSE: 178 MS
PROT SERPL-MCNC: 7.4 G/DL (ref 6.4–8.3)
QRS DURATION - MUSE: 98 MS
QT - MUSE: 390 MS
QTC - MUSE: 455 MS
R AXIS - MUSE: 21 DEGREES
RBC # BLD AUTO: 5.18 10E6/UL (ref 3.8–5.9)
SODIUM SERPL-SCNC: 139 MMOL/L (ref 135–145)
SYSTOLIC BLOOD PRESSURE - MUSE: NORMAL MMHG
T AXIS - MUSE: 9 DEGREES
TRIGL SERPL-MCNC: 163 MG/DL
TSH SERPL DL<=0.005 MIU/L-ACNC: 0.56 UIU/ML (ref 0.3–4.2)
VENTRICULAR RATE- MUSE: 82 BPM
VIT B12 SERPL-MCNC: 902 PG/ML (ref 232–1245)
WBC # BLD AUTO: 8.1 10E3/UL (ref 4–11)

## 2023-11-08 PROCEDURE — 84443 ASSAY THYROID STIM HORMONE: CPT

## 2023-11-08 PROCEDURE — 250N000013 HC RX MED GY IP 250 OP 250 PS 637

## 2023-11-08 PROCEDURE — 73120 X-RAY EXAM OF HAND: CPT | Mod: RT

## 2023-11-08 PROCEDURE — 80053 COMPREHEN METABOLIC PANEL: CPT

## 2023-11-08 PROCEDURE — 82746 ASSAY OF FOLIC ACID SERUM: CPT

## 2023-11-08 PROCEDURE — 99223 1ST HOSP IP/OBS HIGH 75: CPT | Mod: AI | Performed by: PSYCHIATRY & NEUROLOGY

## 2023-11-08 PROCEDURE — 36415 COLL VENOUS BLD VENIPUNCTURE: CPT

## 2023-11-08 PROCEDURE — 124N000002 HC R&B MH UMMC

## 2023-11-08 PROCEDURE — 85014 HEMATOCRIT: CPT

## 2023-11-08 PROCEDURE — 250N000013 HC RX MED GY IP 250 OP 250 PS 637: Performed by: STUDENT IN AN ORGANIZED HEALTH CARE EDUCATION/TRAINING PROGRAM

## 2023-11-08 PROCEDURE — 250N000013 HC RX MED GY IP 250 OP 250 PS 637: Performed by: PHYSICIAN ASSISTANT

## 2023-11-08 PROCEDURE — 82607 VITAMIN B-12: CPT

## 2023-11-08 PROCEDURE — A9270 NON-COVERED ITEM OR SERVICE: HCPCS

## 2023-11-08 PROCEDURE — 99222 1ST HOSP IP/OBS MODERATE 55: CPT | Performed by: PHYSICIAN ASSISTANT

## 2023-11-08 PROCEDURE — 80061 LIPID PANEL: CPT

## 2023-11-08 PROCEDURE — H2032 ACTIVITY THERAPY, PER 15 MIN: HCPCS

## 2023-11-08 PROCEDURE — 250N000012 HC RX MED GY IP 250 OP 636 PS 637

## 2023-11-08 RX ORDER — POLYETHYLENE GLYCOL 3350 17 G/17G
17 POWDER, FOR SOLUTION ORAL DAILY
Status: DISCONTINUED | OUTPATIENT
Start: 2023-11-08 | End: 2023-11-29 | Stop reason: HOSPADM

## 2023-11-08 RX ORDER — SULINDAC 200 MG/1
200 TABLET ORAL 2 TIMES DAILY PRN
Status: DISCONTINUED | OUTPATIENT
Start: 2023-11-08 | End: 2023-11-20

## 2023-11-08 RX ORDER — NICOTINE 21 MG/24HR
1 PATCH, TRANSDERMAL 24 HOURS TRANSDERMAL DAILY
Status: DISCONTINUED | OUTPATIENT
Start: 2023-11-08 | End: 2023-11-29 | Stop reason: HOSPADM

## 2023-11-08 RX ADMIN — CLONAZEPAM 1 MG: 1 TABLET ORAL at 17:40

## 2023-11-08 RX ADMIN — NICOTINE POLACRILEX 4 MG: 4 GUM, CHEWING BUCCAL at 18:21

## 2023-11-08 RX ADMIN — ROSUVASTATIN 40 MG: 20 TABLET, FILM COATED ORAL at 08:25

## 2023-11-08 RX ADMIN — OLANZAPINE 10 MG: 10 TABLET, ORALLY DISINTEGRATING ORAL at 17:06

## 2023-11-08 RX ADMIN — NICOTINE 1 PATCH: 21 PATCH, EXTENDED RELEASE TRANSDERMAL at 19:10

## 2023-11-08 RX ADMIN — DOXYCYCLINE HYCLATE 100 MG: 100 CAPSULE ORAL at 19:20

## 2023-11-08 RX ADMIN — LITHIUM CARBONATE 900 MG: 450 TABLET, EXTENDED RELEASE ORAL at 22:19

## 2023-11-08 RX ADMIN — GABAPENTIN 1200 MG: 600 TABLET, FILM COATED ORAL at 14:02

## 2023-11-08 RX ADMIN — EMPAGLIFLOZIN 10 MG: 10 TABLET, FILM COATED ORAL at 10:19

## 2023-11-08 RX ADMIN — NICOTINE POLACRILEX 4 MG: 4 GUM, CHEWING BUCCAL at 10:58

## 2023-11-08 RX ADMIN — ACETAMINOPHEN 650 MG: 325 TABLET, FILM COATED ORAL at 14:37

## 2023-11-08 RX ADMIN — GABAPENTIN 1200 MG: 600 TABLET, FILM COATED ORAL at 08:25

## 2023-11-08 RX ADMIN — NICOTINE POLACRILEX 4 MG: 4 GUM, CHEWING BUCCAL at 16:23

## 2023-11-08 RX ADMIN — ACETAMINOPHEN 650 MG: 325 TABLET, FILM COATED ORAL at 09:48

## 2023-11-08 RX ADMIN — INSULIN GLARGINE 25 UNITS: 100 INJECTION, SOLUTION SUBCUTANEOUS at 10:18

## 2023-11-08 RX ADMIN — NICOTINE POLACRILEX 4 MG: 4 GUM, CHEWING BUCCAL at 22:19

## 2023-11-08 RX ADMIN — NICOTINE POLACRILEX 4 MG: 4 GUM, CHEWING BUCCAL at 12:49

## 2023-11-08 RX ADMIN — NICOTINE POLACRILEX 4 MG: 4 GUM, CHEWING BUCCAL at 08:43

## 2023-11-08 RX ADMIN — NICOTINE POLACRILEX 4 MG: 4 GUM, CHEWING BUCCAL at 21:17

## 2023-11-08 RX ADMIN — QUETIAPINE FUMARATE 400 MG: 200 TABLET ORAL at 22:18

## 2023-11-08 RX ADMIN — OLANZAPINE 10 MG: 10 TABLET, ORALLY DISINTEGRATING ORAL at 08:27

## 2023-11-08 RX ADMIN — TESTOSTERONE 50 MG OF TESTOSTERONE: 50 GEL TRANSDERMAL at 08:26

## 2023-11-08 RX ADMIN — DOXYCYCLINE HYCLATE 100 MG: 100 CAPSULE ORAL at 08:27

## 2023-11-08 RX ADMIN — PANTOPRAZOLE SODIUM 40 MG: 40 TABLET, DELAYED RELEASE ORAL at 08:26

## 2023-11-08 RX ADMIN — SULINDAC 200 MG: 200 TABLET ORAL at 14:37

## 2023-11-08 RX ADMIN — CLONAZEPAM 1 MG: 1 TABLET ORAL at 08:26

## 2023-11-08 RX ADMIN — AMLODIPINE BESYLATE 10 MG: 5 TABLET ORAL at 08:25

## 2023-11-08 RX ADMIN — TRETINOIN: 0.5 CREAM TOPICAL at 22:19

## 2023-11-08 RX ADMIN — GABAPENTIN 1200 MG: 600 TABLET, FILM COATED ORAL at 19:20

## 2023-11-08 RX ADMIN — POLYETHYLENE GLYCOL 3350 17 G: 17 POWDER, FOR SOLUTION ORAL at 16:23

## 2023-11-08 ASSESSMENT — ACTIVITIES OF DAILY LIVING (ADL)
ADLS_ACUITY_SCORE: 57
ORAL_HYGIENE: INDEPENDENT
HYGIENE/GROOMING: INDEPENDENT
ADLS_ACUITY_SCORE: 57
LAUNDRY: WITH SUPERVISION
ADLS_ACUITY_SCORE: 57
ADLS_ACUITY_SCORE: 57
DRESS: STREET CLOTHES
ADLS_ACUITY_SCORE: 57

## 2023-11-08 NOTE — PLAN OF CARE
BEH IP Unit Acuity Rating Score (UARS)  Patient is given one point for every criteria they meet.    CRITERIA SCORING   On a 72 hour hold, court hold, committed, stay of commitment, or revocation 1    Patient LOS on BEH unit exceeds 20 days 0  LOS: 1   Patient under guardianship, 55+, otherwise medically complex, or under age 11 0   Suicide ideation without relief of precipitating factors 0   Current plan for suicide 0   Current plan for homicide 0   Imminent risk or actual attempt to seriously harm another without relief of factors precipitating the attempt 0   Severe dysfunction in daily living (ex: complete neglect for self care, extreme disruption in vegetative function, extreme deterioration in social interactions) 1   Recent (last 7 days) or current physical aggression in the ED or on unit 1   Restraints or seclusion episode in past 72 hours 1   Recent (last 7 days) or current verbal aggression, agitation, yelling, etc., while in the ED or unit 0   Active psychosis 1   Need for constant or near constant redirection (from leaving, from others, etc).  0   Intrusive or disruptive behaviors 0   TOTAL 5

## 2023-11-08 NOTE — PROGRESS NOTES
Orthopedic Surgery Phone Consult Note    Orthopedics did not personally examine the patient. But per consulting provider, patient has history of punch wall and window with fist over past few days. Notes increased pain and swelling. ED placed patient in orthoglass splint.    Radiographs of right hand obtained on 11/8/23 were reviewed. These demonstrate no evidence of acute fracture or dislocation.    Recommend removal of splint. Patient can have ace wrap for compression or removable wrist brace for comfort as needed. Patient can have outpatient referral to hand surgeon once discharged if continues to have hand pain. This was communicated to primary team.    If additional questions, please contact orthopedic surgery.    Neil Savage MD  Orthopedic Surgery PGY1  [phone]    Please page me directly with any questions/concerns during regular weekday hours before 5 pm. If there is no response, if it is a weekend, or if it is during evening hours then please page the orthopedic surgery resident on call.

## 2023-11-08 NOTE — CARE PLAN
11/07/23 1949   Patient Belongings   Did you bring any home meds/supplements to the hospital?  No   Patient Belongings locker   Patient Belongings Put in Hospital Secure Location (Security or Locker, etc.) cell phone/electronics;clothing;glasses   Belongings Search Yes   Clothing Search Yes   Second Staff Sanket and Pedro     Lists of patient belonging in the locker:  Super tips color (1 box), coloring book (2), book (1), colors of the world (3 boxes), short pants (2), long pants (1), boxer (2), ear bud (1), white cell phone (1), glasses (1 pair), slipper (1), t-shirt (1), and sweat shirt (1).      (11/08/2023) black hoodie swear (1), underwear (5), t-shirt (4), pant, jaydon, sock pairs (5), three shorts, and one pillow case.       ..A               Admission:  I am responsible for any personal items that are not sent to the safe or pharmacy.  [facility] is not responsible for loss, theft or damage of any property in my possession.    Signature:  _________________________________ Date: _______  Time: _____                                              Staff Signature:  ____________________________ Date: ________  Time: _____      2nd Staff person, if patient is unable/unwilling to sign:    Signature: ________________________________ Date: ________  Time: _____     Discharge:  ) [facility] has returned all of my personal belongings:    Signature: _________________________________ Date: ________  Time: _____                                          Staff Signature:  ____________________________ Date: ________  Time: _____

## 2023-11-08 NOTE — PLAN OF CARE
Pt appears to have slept for 6.75 hours. No PRNs given or requested this shift. Pt remained in his room the entire shift. Pt continue to be on SIO for SI ; No concerns noted this shift. Will continue to monitor and offer support.     Problem: Sleep Disturbance  Goal: Adequate Sleep/Rest  Outcome: Progressing   Goal Outcome Evaluation:

## 2023-11-08 NOTE — CONSULTS
"[facility]  Consult Note - Hospitalist Service  Date of Admission:  11/2/2023  Consult Requested by: Psychiatry   Reason for Consult: \"worsening pain in right hand after punching window last night\"    Assessment & Plan   Ayana Prasad is a 33 year old adult with PMH significant for diabetes mellitus, type 2, hyperlipidemia, schizoaffective disorder, bipolar type, BPD, AVA, PTSD, ADHD, gender dysphoria, polysubstance use disorder (opioids, THC, amphetamine, MDMA, nicotine) who was admitted on 11/2/2023 to inpatient Psychiatry for increasing paranoia and manic behavior in the setting of ativan overdose. Medicine was consulted due to the above.     Right hand pain   Patient punched a wall with a closed right fist on 11/2 and patient was placed in splint in ED.  Patient punched a window with a closed right hand fist again on 11/7 resulting in worsening right hand pain.  Initial imaging on 11/2 showed no acute fracture or dislocation.  Notable soft tissue swelling.  Repeat x-ray of right hand on 11/7 again showed no signs of acute fracture or joint malalignment.  On exam, notable increased swelling on right hand compared to left.  Patient also comments on numbness and tingling of fifth digit on right hand.  Question of splint needs to be readjusted.  Patient denies splint feeling too tight.  On exam, sensation is intact.  Discomfort with flexion and extension of digits on right upper extremity.  Capillary refill less than 2 seconds in extremities feel warm and well-perfused.  Discussed with Orthopedics, agreed with holding off on further imaging and noted splint could be removed and ACE wrap could be applied PRN for support   -Remove splint, discussed with RN  -Ok to place ACE warp PRN for support/comfort   -Pain: acetaminophen PRN and sulindac PRN  -Please reach out to Medicine if any loss of sensation, neurological changes or concern for poor perfusion     Constipation: " "ordered miralax daily     Medicine will continue to follow at this time peripherally      The patient's care was discussed with the Bedside Nurse and Patient.    Clinically Significant Risk Factors Present on Admission                  # Hypertension: Noted on problem list     # DMII: A1C = 7.8 % (Ref range: <5.7 %) within past 6 months   # Obesity: Estimated body mass index is 35.98 kg/m  as calculated from the following:    Height as of this encounter: 1.702 m (5' 7\").    Weight as of this encounter: 104.2 kg (229 lb 11.2 oz).         # Financial/Environmental Concerns: unemployed         Sangeeta Araya PA-C  Hospitalist Service  Securely message with [employer] (more info)  Text page via [employer] Paging/Directory   ______________________________________________________________________    Chief Complaint   Right hand pain    History is obtained from the patient    History of Present Illness   Ayana Prasad is a 33 year old adult with PMH significant for diabetes mellitus, type 2, hyperlipidemia, schizoaffective disorder, bipolar type, BPD, AVA, PTSD, ADHD, gender dysphoria, polysubstance use disorder (opioids, THC, amphetamine, MDMA, nicotine) who was admitted on 11/2/2023 to inpatient Psychiatry for increasing paranoia and manic behavior in the setting of ativan overdose.    Prakash is having a great increase in pain of right hand since he punched a window last night.  Denies any loss of sensation.  But notes he has some numbness and tingling in his fifth digit.  Does not feel like his splint is too tight, however does note that his right hand has been getting more swollen.  Able to move digits some, but restricted due to pain.  No discomfort with elbow of right upper extremity.  Has some mild discomfort with right wrist.  Discussed plan to follow-up results from x-ray and considering reaching out to orthopedics to adjust splint. No fever or chills, chest pain, dyspnea, nausea, vomiting. Notes he has not had good " bowel movement since being here and would like miralax.      Past Medical History    Past Medical History:   Diagnosis Date    ADHD (attention deficit hyperactivity disorder)     Bipolar 1 disorder     Borderline personality disorder     Cauda equina syndrome     Chronic low back pain     Depression     Diabetes mellitus, type 2 (H) 1/19/2023    GERD (gastroesophageal reflux disease)     h/o TBI (traumatic brain injury)     Hypertension, unspecified type 12/16/2021    Marginal corneal ulcer, left 07/17/2015    Nephrolithiasis     obesity     Polysubstance abuse - methamphetamine, hallucinagen, heroin, marijuana     currently in remission    PONV (postoperative nausea and vomiting)     PTSD (post-traumatic stress disorder)        Past Surgical History   Past Surgical History:   Procedure Laterality Date    BACK SURGERY  12/24/2016    BACK SURGERY - For Cauda Equina Syndrome  12/24/2016    COLONOSCOPY      COMBINED CYSTOSCOPY, INSERT STENT URETER(S) Left 8/30/2018    Procedure: COMBINED CYSTOSCOPY, INSERT STENT URETER(S);  Cystoscopy With Left Stent Placement;  Surgeon: Kiran Ulrich MD;  Location: [facility]    COMBINED CYSTOSCOPY, RETROGRADES, EXCHANGE STENT URETER(S) Left 10/14/2018    Procedure: COMBINED CYSTOSCOPY, RETROGRADES, EXCHANGE STENT URETER(S);  Cystoscopy and removal of left-sided stent.;  Surgeon: Stiven Olivo MD;  Location: [facility] OR    COMBINED CYSTOSCOPY, RETROGRADES, URETEROSCOPY, INSERT STENT  1/6/2014    Procedure: COMBINED CYSTOSCOPY, RETROGRADES, URETEROSCOPY, INSERT STENT;  Cystyoscopy place left ureteral stent;  Surgeon: Jaun Kimble MD;  Location: [facility]    COMBINED CYSTOSCOPY, RETROGRADES, URETEROSCOPY, INSERT STENT Left 10/23/2018    Procedure: Video cystoscopy, left ureteroscopy with stone extraction;  Surgeon: Bull Mast MD;  Location: [facility] OR    CYSTOSCOPY, URETEROSCOPY, COMBINED Right 9/23/2015    Procedure: COMBINED CYSTOSCOPY, URETEROSCOPY;  Surgeon:  ROME Jett MD;  Location: [facility] OR    ENT SURGERY      ESOPHAGOSCOPY, GASTROSCOPY, DUODENOSCOPY (EGD), COMBINED  4/8/2013    Procedure: COMBINED ESOPHAGOSCOPY, GASTROSCOPY, DUODENOSCOPY (EGD), BIOPSY SINGLE OR MULTIPLE;  Gastroscopy;  Surgeon: Peter Pickard MD;  Location: [facility]    ESOPHAGOSCOPY, GASTROSCOPY, DUODENOSCOPY (EGD), COMBINED Left 10/28/2014    Procedure: COMBINED ESOPHAGOSCOPY, GASTROSCOPY, DUODENOSCOPY (EGD), BIOPSY SINGLE OR MULTIPLE;  Surgeon: Narcisa Ramirez MD;  Location:  OR    ESOPHAGOSCOPY, GASTROSCOPY, DUODENOSCOPY (EGD), COMBINED N/A 12/24/2018    Procedure: COMBINED ESOPHAGOSCOPY, GASTROSCOPY, DUODENOSCOPY (EGD), BIOPSY SINGLE OR MULTIPLE;  Surgeon: Sonu Verduzco MD;  Location: [facility]    INJECT EPIDURAL TRANSFORAMINAL LUMBAR / SACRAL EA ADDITIONAL LEVEL Left 3/18/2021    Procedure: Left L5/S1 transforaminal injection for selective L5 nerve root block;  Surgeon: Eliza Pagan MD;  Location: [facility] OR    LAPAROSCOPIC CHOLECYSTECTOMY  11/20/2014    [facility], Dr. Ramirez    LASER HOLMIUM LITHOTRIPSY URETER(S), INSERT STENT, COMBINED  4/2/2014    Procedure: COMBINED CYSTOSCOPY, URETEROSCOPY, LASER HOLMIUM LITHOTRIPSY URETER(S), INSERT STENT;  Cystoscopy,Left Ureteral Stent Removal,Left Ureteroscopy with Laser Lithotripsy,Left Ureter Stent Placement;  Surgeon: ROME Jett MD;  Location: [facility] OR    Transurethral stone resection  03/11/2011       Medications   I have reviewed this patient's current medications       Review of Systems    The 5 point Review of Systems is negative other than noted in the HPI or here.     Social History   I have reviewed this patient's social history and updated it with pertinent information if needed.  Social History     Tobacco Use    Smoking status: Every Day     Packs/day: 0.50     Years: 5.00     Additional pack years: 0.00     Total pack years: 2.50     Types: Other, Cigarettes     Start date: 8/1/2011     Last attempt to quit: 9/14/2022     " Years since quittin.1     Passive exposure: Never    Smokeless tobacco: Former     Types: Chew     Quit date: 2019    Tobacco comments:     Just nicotine gum currently. 23   Vaping Use    Vaping Use: Former    Substances: Nicotine, Flavoring    Devices: Refillable tank   Substance Use Topics    Alcohol use: No    Drug use: Not Currently     Types: Marijuana, Opiates     Comment: denies using oxy or other opiates_\"not for years\"         Family History   I have reviewed this patient's family history and updated it with pertinent information if needed.  Family History   Problem Relation Age of Onset    Gastrointestinal Disease Father         Crohn's Disease    Cancer Father         Liver Cancer    Other Cancer Father         Liver    Obesity Father     Cancer Maternal Grandmother         lympoma    Diabetes Maternal Grandmother     Mental Illness Maternal Grandmother     Other Cancer Maternal Grandmother         Non Hodgkins Lymphoma    Diabetes Maternal Grandfather     Hypertension Maternal Grandfather     Prostate Cancer Maternal Grandfather     Hyperlipidemia Maternal Grandfather     Heart Disease Paternal Grandfather         heart disease    Hypertension Brother     Other Cancer Brother         Esophagecial    Diabetes Brother     Obesity Brother     Hyperlipidemia Mother     Mental Illness Mother     Anxiety Disorder Mother     Anesthesia Reaction Mother         Vomiting/Nausea    Other Cancer Mother     Hypertension Brother     Other Cancer Brother     Other Cancer Paternal Half-Brother     No Known Problems Sister          Allergies   Allergies   Allergen Reactions    Haldol [Haloperidol] Other (See Comments)     Makes patient very angry and anxious    Adhesive Tape Hives    Percocet [Oxycodone-Acetaminophen] Nausea and Vomiting    Prednisone Other (See Comments) and Hives     Suicidal ideation    Risperidone Other (See Comments)    Tramadol Hcl Nausea and Vomiting    Droperidol Anxiety    " Seroquel [Quetiapine] Palpitations     Spent 2 weeks in the hospital due to having seroquel, caused palpitations and QT prolongation        Physical Exam   Vital Signs: Temp: 97.6  F (36.4  C) Temp src: Oral BP: (!) 147/99 Pulse: 100   Resp: 16 SpO2: 96 % O2 Device: None (Room air)    Weight: 229 lbs 11.2 oz    General Appearance: Alert, cooperative, in no acute distress, restless, appears uncomfortable  HEENT: normocephalic, atraumatic, anicteric sclera   Respiratory: breathing comfortably on RA, non-labored breathing   Cardiac: on RUE, radial pulse appreciated, but challenging to assess with brace, cap refill on RUE <2 seconds   GI: non-distended   MSK: no bony deformities, decrease ROM restricted by pain on digits on RUE, right elbow ROM in tact, edema appreciated on right hand compared to left   Neuro: sensation in tact on RUE  Skin: no rashes or lesions on uncovered surfaces     Medical Decision Making       50 MINUTES SPENT BY ME on the date of service doing chart review, history, exam, documentation & further activities per the note.      Data   NOTE: Data reviewed over the past 24 hrs contributes toward MDM complexity

## 2023-11-08 NOTE — PROVIDER NOTIFICATION
11/08/23 0983   Individualization/Patient Specific Goals   Patient Personal Strengths community support;expressive of emotions;expressive of needs;family/social support;humor;interests/hobbies;positive attitude;resilient;resourceful;stable living environment;tolerant   Patient Vulnerabilities adverse childhood experience(s);history of unsuccessful treatment;substance abuse/addiction;traumatic event   Anxieties, Fears or Concerns None   Individualized Care Needs Currently on 1:1 due to SIB   Interprofessional Rounds   Summary 1. Stabilization of mood disorder sx's 2. Safe with self  3. Medication mgmt per MD's  4. Coordination of care with outpatient providers 5.Housing addressed 6. Outpatient f/u care in place   Participants CTC;nursing;psychiatrist;patient  (med students)   Behavioral Team Discussion   Participants Dr. Willson, Dr. Zelaya, Nohemi John RN, Luz Lincoln MA.LP, Med students   Progress Initial assessment   Anticipated length of stay 5-7 days   Continued Stay Criteria/Rationale S/P overdose, medication mgmt   Medical/Physical see H&P   Precautions Per unit protocol- Current SIO   Plan Patient will be seen by Psychiatry daily.  Meds will be reviewed/adjusted per MD's. Care will be coordinated with outpatient providers. Hopefully patient can return to current    Rationale for change in precautions or plan No change in plan/precautions   Safety Plan Patient to [facility]   Anticipated Discharge Disposition group home

## 2023-11-08 NOTE — DISCHARGE INSTRUCTIONS
Behavioral Discharge Planning and Instructions    Summary:   You were admitted to [facility] on 11/7/23  with worsening depression, anxiety and psychosis under the care of Ricardo Washington. You met with the Psychiatry team daily for ongoing psychiatric assessment and medication management.    Due to concerns for your safety, your PD was revoked.   You will once again be provisionally discharged for the remainder of your commitment.  You had opportunities to participate in therapeutic groups on the unit.   At this time you report your mood is stabilizing and you report you are not having thoughts or intent to harm yourself or others. You will be discharged home and will resume care with your outpatient providers.    Disposition:  Group Home      Main Diagnosis:   Schizoaffective disorder, bipolar type  AVA  PTSD  History of Polysubstance Use Disorder    Health Care Follow-up:   Next Prolixin Injection due: 12/11/23.  RN at  will provide.    Appointment:  Psychiatry: Regine Last, CNP:  12/1/23 at 1pm for a 60 min video visit.    of  Psychiatry Clinic; [facility], 2nd floor [facility] [address] [place] [address]  [phone]      Appointment: Individual Therapy: Hector Edmonds LGSW:  Friday, December 1, 2023 @ 9am via Telehealth   [facility]  [address] [place] [address] [phone]  Important Notes: Please log into [employer] at 8:30am to complete your Check-in. The email [facility] has on file is [email or web address]. This provider offers in-person visits for future appointments.      Appointment: Orthopedic: Stiven Parsons MD Thursday, December 7, 2023 @ 1pm In-person  [facility]:   [address] [place] [address]  Phone: [phone]    Appointment: Day Treatment: Contact: Nancy Martinez:  You are on the wait list and will be contacted when opening is available.  [facility] [address] [place] [address]  [phone]      CADI : Radha Britt: [phone]  CM: Cristy Ji: [phone]    Major Treatments, Procedures and Findings:   Medications were  managed throughout your stay. An internal medicine consult was completed during your stay. You had the opportunity to participate in treatment programming while on the unit including occupational therapy, mental health support and education and spiritual services.     Symptoms to Report:   Please report if you are experiencing increased aggression and/or confusion, problematic loss of sleep, worsening mood, or thoughts of suicide to your treatment team or notify your primary provider.   IF THE SYMPTOMS YOU ARE EXPERIENCING ARE A MEDICAL EMERGENCY, CALL 911 IMMEDIATELY    Lifestyle Adjustment:   1. Adjust your lifestyle to get enough sleep, relaxation, exercise and good nutrition.  Continue to develop healthy coping skills to decrease stress and promote a healthy and sober lifestyle.  2. Abstain from all substances of abuse.  3. Take medications as prescribed.  Please work with your doctor to discuss any concerns you have with your medications or side effects you may be experiencing.  4. Follow up with appointments as scheduled.      Resources:   *[facility] Crisis: COPE: ([phone]) 24 hour mobile crisis support for people having a mental health crisis in [facility].   *Acute Psychiatric Services ([phone]). 24-hour walk-in crisis psychiatric support at [facility]; Emergency Medications Clinic available 7:30am - 2:00pm  *[id number]: Text  \"life\"  to [phone]   A trained crisis counselor will respond immediately  *Crisis Connection: ([phone])  24-hour confidential telephone counseling     General Medication Instructions:   See your medication sheet(s) for instructions.   Take all medicines as directed.  Make no changes unless your doctor suggests them.   Go to all your doctor visits.  Be sure to " have all your required lab tests. This way, your medicines can be refilled on time.  Do not use any drugs not prescribed by your doctor.    Advance Directives:   Scanned document on file with [facility]? No scanned doc  Is document scanned? Pt states no documents  Honoring Choices Your Rights Handout: Informed and given  Was more information offered? Materials given    The Treatment team has appreciated the opportunity to work with you. If you have any questions or concerns about your recent admission, you can contact the unit which can receive your call 24 hours a day, 7 days a week. They will be able to get in touch with a Provider if needed. The unit number is [phone]

## 2023-11-08 NOTE — PLAN OF CARE
"Goal Outcome Evaluation:    Problem: Psychotic Signs/Symptoms  Goal: Improved Behavioral Control (Psychotic Signs/Symptoms)  Outcome: Progressing  Flowsheets (Taken 11/8/2023 0924)  Mutually Determined Action Steps (Improved Behavioral Control): identifies future-oriented goal  Individualized Action Step (Improved Behavioral Control): Pt reports that he was feeling \"implusive\" last night, but he is feeling more \"in-control\" today     Problem: Psychotic Signs/Symptoms  Goal: Improved Mood Symptoms (Psychotic Signs/Symptoms)  Outcome: Progressing  Flowsheets (Taken 11/8/2023 0924)  Mutually Determined Action Steps (Improved Mood Symptoms):   acknowledges progress   verbalizes increased insight    Pt presents calm, cooperative, and pleasant. Pt denies SI/HI and SIB, however endorses auditory hallucinations, some of which are command in nature. Pt reports the voices are commanding Pt to \"try to escape\", however Pt denies feeling as though he is going to act on this and does not have any urge to try to elope. Pt denies feeling depressed but does endorse \"a lot of anxiety\" and requested morning medications right when he woke up because he wanted his Klonopin to help his anxiety.   Pt reports that he has been having pain since injuring his right hand in ED and requested to have it re-wrapped and to use his sling. Pt originally reports pain of 5/10, but the pain increased to 8/10. Pt received PRN Tylenol and a cold pack to help reduce pain and writer spoke with provider about additional options for pain control. Provider explained since Pt punched a window after receiving radiographs, additions rads would be taken. After rads are taken, a pain management regiment can be put in place.   Pt refused breakfast explaining he never eats breakfast. Pt reports no concerns with bowel/bladder.   VSS (hypertensive), med compliant, behaviorally in control throughout shift.     "
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
## 2023-11-08 NOTE — PROGRESS NOTES
----------------------------------------------------------------------------------------------------------  [facility]  Psychiatry Progress Note  Hospital Day #1     Interim History:     The patient's care was discussed with the treatment team and chart notes were reviewed.    Vitals: VSS  Sleep: 6.75 hours (11/08/23 0715)  Scheduled medications: Took all scheduled medications as prescribed  Psychiatric PRN medications: No psychiatric prns given    Staff Report:   No acute events or safety concerns overnight, but one instance of aggressive outburst late yesterday (punched a window). In summary, Prakash remains on SIO for history of agresssion with self-harm. Please see staff notes for details.      Subjective:     Patient Interview:  Prakash Prasad is a 33 year old male with a history of schizoaffective disorder (bipolar type), borderline personality disorder, ADHD, depression and polysubstance abuse (opioids, methamphetamine, nicotine, thc) who presented to the ED 11/2 with concerns for haven and worsening psychosis. The team met with Prakash in his room right after administration of his insulin and empagliflozin. He reports doing better and is happy to be out of the ED. Feels comforted to see some familiar nurses from prior hospitalizations.     Prakash's primary complaint is hand pain, reporting that he lost control and punched the window in his room. He had previously injured his hand in a recent incident (x-ray did not reveal a fracture at that time), but Prakash is concerned that his hand is now worse after last night's outburst. He is taking his gabapentin for neurogenic pain related to sciatica and previous back injury, but this is not addressing his hand pain.     In general, Prakash discusses ongoing feelings of restlessness, an inability to stop moving, and difficulties with concentration. Said he hears voices telling him to run. Does not want to act on them.  "Says he has heard them for a long time. He does not endorse any feelings to run away, although having many people in his room at once is triggering for anxiety and nervousness. Several members of the team stepped out after Prakash expressed this anxiety.    Prakash is currently living in a group home in [place], [place], but his  wants him to discharge, feeling that this home is unsafe and should lose their license. Prakash describes that this home does not help tenants with medication administration or provide frequent meals. He feels that the lack of these supports greatly contributed to his current hospitalization and past hospitalizations this summer. Prakash also shared that 3-weeks ago his Dad was diagnosed with cancer, which added an additional life stressor. Members of the group home and his  believed he was becoming manic.    Prakash does not report any suicidal ideation or intent to self-harm or harm others. He endorses AH, stating that the voices can become quite aggressive and insistent at times, but has been ongoing for 10+ years. Compared to when he was admitted to the ED, his anxiety has gone from a 10/10 to a 6-7/10.    Prakash requested access to his coloring materials and is agreeable to treating his hand pain while continuing to monitor his MH symptoms.      ROS:  Patient has  agitation/impulse to move and pain in right hand after \"punching window\" last night   Patient denies constipation (last bowel movement? Not asked), nausea, and vomit     Objective:     Vitals:  BP (!) 147/99 (BP Location: Left arm, Patient Position: Sitting, Cuff Size: Adult Regular)   Pulse 100   Temp 97.6  F (36.4  C) (Oral)   Resp 16   Ht 1.702 m (5' 7\")   Wt 104.2 kg (229 lb 11.2 oz)   SpO2 96%   BMI 35.98 kg/m      Allergies:  Allergies   Allergen Reactions    Haldol [Haloperidol] Other (See Comments)     Makes patient very angry and anxious    Adhesive Tape Hives    Percocet " [Oxycodone-Acetaminophen] Nausea and Vomiting    Prednisone Other (See Comments) and Hives     Suicidal ideation    Risperidone Other (See Comments)    Tramadol Hcl Nausea and Vomiting    Droperidol Anxiety    Seroquel [Quetiapine] Palpitations     Spent 2 weeks in the hospital due to having seroquel, caused palpitations and QT prolongation       Current Medications:  Scheduled:  Current Facility-Administered Medications   Medication Dose Route Frequency    amLODIPine  10 mg Oral Daily    clonazePAM  1 mg Oral BID    doxycycline hyclate  100 mg Oral Q12H [facility] (08/20)    empagliflozin  10 mg Oral Daily    gabapentin  1,200 mg Oral TID    insulin glargine  25 Units Subcutaneous QAM AC    lithium ER  900 mg Oral At Bedtime    OLANZapine zydis  10 mg Oral BID IS    pantoprazole  40 mg Oral Daily    QUEtiapine  400 mg Oral At Bedtime    rosuvastatin  40 mg Oral Daily    testosterone  50 mg of testosterone Transdermal Daily    tretinoin   Topical At Bedtime       PRN:  Current Facility-Administered Medications   Medication Dose Route Frequency    acetaminophen  650 mg Oral Q4H PRN    alum & mag hydroxide-simethicone  30 mL Oral Q4H PRN    glucose  15-30 g Oral Q15 Min PRN    Or    dextrose  25-50 mL Intravenous Q15 Min PRN    Or    glucagon  1 mg Subcutaneous Q15 Min PRN    hydrALAZINE  10 mg Oral 4x Daily PRN    melatonin  3 mg Oral At Bedtime PRN    nicotine polacrilex  4 mg Buccal Q1H PRN    OLANZapine  10 mg Oral TID PRN    Or    OLANZapine  10 mg Intramuscular TID PRN    polyethylene glycol  17 g Oral Daily PRN    sulindac  200 mg Oral BID PRN       Labs and Imaging:  New results:   Recent Results (from the past 24 hour(s))   EKG 12-lead, complete    Collection Time: 11/07/23  9:36 PM   Result Value Ref Range    Systolic Blood Pressure  mmHg    Diastolic Blood Pressure  mmHg    Ventricular Rate 82 BPM    Atrial Rate 82 BPM    WV Interval 178 ms    QRS Duration 98 ms     ms    QTc 455 ms    P Axis 36 degrees    R  AXIS 21 degrees    T Axis 9 degrees    Interpretation ECG       Sinus rhythm  Normal ECG  When compared with ECG of 28-AUG-2023 15:27,  No significant change was found     Comprehensive metabolic panel    Collection Time: 11/08/23  7:46 AM   Result Value Ref Range    Sodium 139 135 - 145 mmol/L    Potassium 4.2 3.4 - 5.3 mmol/L    Carbon Dioxide (CO2) 24 22 - 29 mmol/L    Anion Gap 7 7 - 15 mmol/L    Urea Nitrogen 14.1 6.0 - 20.0 mg/dL    Creatinine 0.61 0.51 - 1.17 mg/dL    GFR Estimate >90 >60 mL/min/1.73m2    Calcium 9.2 8.6 - 10.0 mg/dL    Chloride 108 (H) 98 - 107 mmol/L    Glucose 124 (H) 70 - 99 mg/dL    Alkaline Phosphatase 82 35 - 129 U/L    AST 66 (H) 0 - 45 U/L    ALT 71 (H) 0 - 70 U/L    Protein Total 7.4 6.4 - 8.3 g/dL    Albumin 4.3 3.5 - 5.2 g/dL    Bilirubin Total 0.5 <=1.2 mg/dL   Lipid panel    Collection Time: 11/08/23  7:46 AM   Result Value Ref Range    Cholesterol 113 <200 mg/dL    Triglycerides 163 (H) <150 mg/dL    Direct Measure HDL 39 (L) >=40 mg/dL    LDL Cholesterol Calculated 41 <=100 mg/dL    Non HDL Cholesterol 74 <130 mg/dL   TSH with free T4 reflex and/or T3 as indicated    Collection Time: 11/08/23  7:46 AM   Result Value Ref Range    TSH 0.56 0.30 - 4.20 uIU/mL   Vitamin B12    Collection Time: 11/08/23  7:46 AM   Result Value Ref Range    Vitamin B12 902 232 - 1,245 pg/mL   Folate    Collection Time: 11/08/23  7:46 AM   Result Value Ref Range    Folic Acid 13.7 4.6 - 34.8 ng/mL   CBC with platelets and differential    Collection Time: 11/08/23  7:46 AM   Result Value Ref Range    WBC Count 8.1 4.0 - 11.0 10e3/uL    RBC Count 5.18 3.80 - 5.90 10e6/uL    Hemoglobin 14.7 11.7 - 17.7 g/dL    Hematocrit 44.7 35.0 - 53.0 %    MCV 86 78 - 100 fL    MCH 28.4 26.5 - 33.0 pg    MCHC 32.9 31.5 - 36.5 g/dL    RDW 15.1 (H) 10.0 - 15.0 %    Platelet Count 311 150 - 450 10e3/uL    % Neutrophils 64 %    % Lymphocytes 26 %    % Monocytes 6 %    % Eosinophils 3 %    % Basophils 1 %    % Immature  Granulocytes 0 %    NRBCs per 100 WBC 0 <1 /100    Absolute Neutrophils 5.2 1.6 - 8.3 10e3/uL    Absolute Lymphocytes 2.1 0.8 - 5.3 10e3/uL    Absolute Monocytes 0.5 0.0 - 1.3 10e3/uL    Absolute Eosinophils 0.2 0.0 - 0.7 10e3/uL    Absolute Basophils 0.0 0.0 - 0.2 10e3/uL    Absolute Immature Granulocytes 0.0 <=0.4 10e3/uL    Absolute NRBCs 0.0 10e3/uL   Glucose by meter    Collection Time: 11/08/23  7:47 AM   Result Value Ref Range    GLUCOSE BY METER POCT 126 (H) 70 - 99 mg/dL   Glucose by meter    Collection Time: 11/08/23 12:13 PM   Result Value Ref Range    GLUCOSE BY METER POCT 103 (H) 70 - 99 mg/dL     Component      Latest Ref Rn 8/22/2023  7:09 AM 8/28/2023  8:58 AM 9/26/2023  12:47 PM 11/7/2023  9:56 AM   Lithium Level      0.60 - 1.20 mmol/L 0.50 (L)  0.40 (L)  0.21 (L)  0.42 (L)       Component      Latest Ref Rn 10/27/2023  10:44 AM 10/29/2023  11:38 PM   Amphetamine Qual Urine      Screen Negative  Screen Negative  Screen Negative    Barbiturates Qual Urine      Screen Negative  Screen Negative  Screen Negative    Cannabinoids Qual Urine      Screen Negative  Screen Positive !  Screen Positive !    Opiates Qualitative Urine      Screen Negative  Screen Negative  Screen Negative    Benzodiazepine Urine      Screen Negative  Screen Negative  Screen Positive !    Cocaine Urine      Screen Negative  Screen Negative  Screen Negative    Fentanyl Qual Urine      Screen Negative  Screen Negative  Screen Negative    PCP Urine      Screen Negative  Screen Negative  Screen Negative        Data this admission:  - Lithium low, 11/7 collection  - CBC unremarkable  - CMP unremarkable  - TSH normal  - UDS pending, not collected  - Lipids: elevated triglycerides, low HDL  - Vit B12 normal  - Folate normal  - UDS, 11/7 not collected  - HCG pending from ED  - UA pending from ED       Mental Status Exam:     Oriented to:  Grossly Oriented, Person/Self, Situation, and Unit/Floor  General:  Awake, Alert, and  "restless  Appearance:  Grooming is adequate, Dressed in glasses and hoodie, and Hair is cut short. Right forearm and hand is wrapped and in a sling. Larger body habitus  Behavior/Attitude:  Cooperative and Easy to redirect  Eye Contact: Glances  Psychomotor: Agitated, Restless, and Pacing no catatonia present  Speech:  appropriate volume/tone  Language: Fluent in English with appropriate syntax and vocabulary.  Mood:  \"in a lot of pain and anxious\"  Affect:  congruent with mood  Thought Process:  linear and coherent  Thought Content:    No SI/SIB/VH, but ongoing AH ; No apparent delusions  Associations:  intact  Insight:  fair due to shared history of recent group home experience, which provides limited medication and meal support  Judgment:  partial due to recent aggressive self-harm behavior (punching walls, banging head)  Impulse control: partial  Attention Span:  adequate for conversation  Concentration:  grossly intact and limited per patient expressed experience  Recent and Remote Memory:  not formally assessed  Fund of Knowledge: average  Muscle Strength and Tone:  not assessed  Gait and Station:  normal but constant pacing back-n-forth and side-to-side     Psychiatric Assessment     Ayana Prasad \"Prakash\" is a 33 year old adult previously diagnosed with schizoaffective disorder bipolar type, BPD, AVA, PTSD, ADHD, gender dysphoria, and polysubstance use disorder (opioids, THC, amphetamine, MDMA, nicotine) who presented voluntarily with increasing paranoia, AH, and manic behavior in the context of medication non-adherence and Ativan overdose. Most recent psychiatric hospitalization was at [facility] from 8/8-8/28/23. Significant symptoms on admission included subjective hypomania and anxiety. The MSE on admission was pertinent for absence of overt haven, flat affect, and auditory hallucinations. Biological contributions to presentation include prior diagnoses of schizoaffective disorder, AVA, PTSD, and ADHD. " Psychological contributions to presentation include prior diagnosis of BPD. Social factors contributing to presentation include stressful environment in his group home, which provides limited support for medication management. Protective factors include being engaged in treatment.     In summary, the patient's reported previous symptoms of manic behavior, paranoia, auditory hallucinations in the context of medication non-adherence and Ativan overdose are consistent with schizoaffective disorder, bipolar type. He will likely benefit from medication management this admission.     Given that he currently has resolving haven and auditory hallucinations, patient warrants inpatient psychiatric hospitalization to maintain his safety. In addition, patient is under civil commitment through [facility] and his provisional discharge was revoked on 10/31/23 for med noncompliance and increasing psychosis symptoms.     Psychiatric Plan by Diagnosis      Today's changes:  - discontinue hydroxyzine (not effective per patient)  - continue all other scheduled psych meds   - Xray of right hand  - Clinoril for pain prn    # Schizoaffective disorder, bipolar type  #Anxiety  #BPD  1. Medications:  - lithium ER, 900mg  - clonazepam 1 mg BID  - olanzapine, 10 mg BID, bedtime  - quetiapine, 400mg, bedtime     2. Pertinent Labs/Monitoring:   - lithium levels, last checked 11/7  - UDS pending     3. Additional Plans:  - Patient will be treated in therapeutic milieu with appropriate individual and group therapies as described  - Consider lamotrigine for dual benefit of mood stabilization and treatment of nerve pain       Psychiatric Hospital Course:      Prakash Prasad was admitted to [facility] as a voluntary patient.   Medications:  PTA lithium, clonazepam, olanzapine, quetiapine, amlodipine, gabapentin, insulin glargine, empagliflozin, pantoprazole, rosuvastatin, testosterone & tretinoin were continued from ED.    New medications  started at the time of admission include sulindac PRN for hand pain.     The risks, benefits, alternatives, and side effects were discussed and understood by the patient.     Medical Assessment and Plan     Medical diagnoses to be addressed this admission:    # ongoing right hand pain  - x-ray ordered, 11/8  - started on sulindac, 200mg BID PRN (NSAID approved for use with lithium)  - internal medicine consulted, 11/8      Medical course: Patient was physically examined by the ED prior to being transferred to the unit and was found to be medically stable and appropriate for admission. Internal medicine has been consulted for management of right hand pain after patient punched window last night 11/7, same hand injured in ED after pt punched a wall, XR in ED was negative for fracture.     Consults:   - internal medicine for treatment of right hand pain after punching window     Checklist     Legal Status: Committed     Safety Assessment:   Behavioral Orders   Procedures    Assault precautions    Code 1 - Restrict to Unit    Routine Programming     As clinically indicated    Status 15     Every 15 minutes.    Status Individual Observation     Patient SIO status reviewed with team/RN.  Please also refer to RN/team documentation for add'l detail.    Patient on 1:1. Must have door open due to suicide risk.     -SIO staff to monitor following which have contributed to patient being on SIO:  SI, manic/impulse-control concerns, self-injurious behavior (repeated wall punching)  -Possible interventions SIO staff could use to support patient's treatment progress:  Supportive care  -When following observed, team will review discontinuation of SIO:  No self-injurious behaviors, no escalation     Order Specific Question:   CONTINUOUS 24 hours / day     Answer:   Other     Order Specific Question:   Specify distance     Answer:   10 feet     Order Specific Question:   Indications for SIO     Answer:   Suicide risk    Suicide  precautions     Patients on Suicide Precautions should have a Combination Diet ordered that includes a Diet selection(s) AND a Behavioral Tray selection for Safe Tray - with utensils, or Safe Tray - NO utensils         Risk Assessment:  Risk for harm is moderate.  Risk factors: impulsive  Protective factors: engaged in treatment     SIO: Yes    Disposition: Pending stabilization, medication optimization, & development of a safe discharge plan.     Attestations     Resident with med student: Chicho Ramos, MS3  [facility] Medical Student     I was present with the medical student who participated in the service and in the documentation of the note.  I have verified the history and personally performed the physical exam and medical decision making. I agree with the assessment and plan of care as documented in the note.    Sunni Zelaya MD  Psychiatry Resident Physician
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
## 2023-11-08 NOTE — PLAN OF CARE
"  Problem: Anxiety  Goal: Anxiety Reduction or Resolution  Outcome: Not Progressing     Problem: Pain Acute  Goal: Optimal Pain Control and Function  Outcome: Progressing   Goal Outcome Evaluation:    Pt is a 33 year old male admitted at 1900 pm from [place] ED for anxiety and AH. Per intake nurse, pt arrived ED in a manic state, restless and anxious.  Pt present with a flat, blunted, anxious and angry affect on arrival. He refused to answer all admission questions. He kept stating \"no comment\" upon assessment.  He was banging his bed and spilled water on the floor. He denied all psych symptoms. He did not contract for safety. He came with an ace wrap and a sling to the right hand which he removed as soon as he got here. Writer attempted to reapply the wrap but he declined. Pt is on SIO1:1 precaution. Pt was seen by the resident doctor. Pt was med compliant. Requested and received prn nicorette x 1. Outburst noted .    "

## 2023-11-09 LAB
GLUCOSE BLDC GLUCOMTR-MCNC: 104 MG/DL (ref 70–99)
GLUCOSE BLDC GLUCOMTR-MCNC: 136 MG/DL (ref 70–99)
GLUCOSE BLDC GLUCOMTR-MCNC: 151 MG/DL (ref 70–99)
GLUCOSE BLDC GLUCOMTR-MCNC: 167 MG/DL (ref 70–99)

## 2023-11-09 PROCEDURE — 250N000013 HC RX MED GY IP 250 OP 250 PS 637

## 2023-11-09 PROCEDURE — 250N000013 HC RX MED GY IP 250 OP 250 PS 637: Performed by: STUDENT IN AN ORGANIZED HEALTH CARE EDUCATION/TRAINING PROGRAM

## 2023-11-09 PROCEDURE — 250N000013 HC RX MED GY IP 250 OP 250 PS 637: Performed by: PHYSICIAN ASSISTANT

## 2023-11-09 PROCEDURE — 99207 PR NO BILLABLE SERVICE THIS VISIT: CPT | Performed by: PHYSICIAN ASSISTANT

## 2023-11-09 PROCEDURE — 99232 SBSQ HOSP IP/OBS MODERATE 35: CPT | Mod: GC | Performed by: STUDENT IN AN ORGANIZED HEALTH CARE EDUCATION/TRAINING PROGRAM

## 2023-11-09 PROCEDURE — A9270 NON-COVERED ITEM OR SERVICE: HCPCS

## 2023-11-09 PROCEDURE — 124N000002 HC R&B MH UMMC

## 2023-11-09 PROCEDURE — 250N000011 HC RX IP 250 OP 636

## 2023-11-09 RX ORDER — OLANZAPINE 10 MG/1
10 TABLET ORAL 3 TIMES DAILY PRN
Status: DISCONTINUED | OUTPATIENT
Start: 2023-11-09 | End: 2023-11-14

## 2023-11-09 RX ORDER — OLANZAPINE 5 MG/1
5 TABLET ORAL AT BEDTIME
Status: COMPLETED | OUTPATIENT
Start: 2023-11-09 | End: 2023-11-15

## 2023-11-09 RX ORDER — QUETIAPINE FUMARATE 200 MG/1
200 TABLET, FILM COATED ORAL AT BEDTIME
Status: DISCONTINUED | OUTPATIENT
Start: 2023-11-09 | End: 2023-11-29 | Stop reason: HOSPADM

## 2023-11-09 RX ORDER — PROPRANOLOL HYDROCHLORIDE 10 MG/1
10 TABLET ORAL 3 TIMES DAILY
Status: DISCONTINUED | OUTPATIENT
Start: 2023-11-09 | End: 2023-11-29 | Stop reason: HOSPADM

## 2023-11-09 RX ORDER — FLUORIDE TOOTHPASTE
15 TOOTHPASTE DENTAL 4 TIMES DAILY PRN
Status: DISCONTINUED | OUTPATIENT
Start: 2023-11-09 | End: 2023-11-29 | Stop reason: HOSPADM

## 2023-11-09 RX ORDER — ONDANSETRON 4 MG/1
4 TABLET, ORALLY DISINTEGRATING ORAL 2 TIMES DAILY PRN
Status: COMPLETED | OUTPATIENT
Start: 2023-11-09 | End: 2023-11-09

## 2023-11-09 RX ORDER — CLONAZEPAM 1 MG/1
1 TABLET ORAL 2 TIMES DAILY PRN
Status: CANCELLED | OUTPATIENT
Start: 2023-11-09

## 2023-11-09 RX ORDER — OLANZAPINE 10 MG/1
10 TABLET ORAL DAILY
Status: DISCONTINUED | OUTPATIENT
Start: 2023-11-10 | End: 2023-11-13

## 2023-11-09 RX ORDER — FLUPHENAZINE HYDROCHLORIDE 2.5 MG/1
2.5 TABLET ORAL AT BEDTIME
Status: DISCONTINUED | OUTPATIENT
Start: 2023-11-09 | End: 2023-11-10

## 2023-11-09 RX ORDER — OLANZAPINE 10 MG/2ML
10 INJECTION, POWDER, FOR SOLUTION INTRAMUSCULAR 3 TIMES DAILY PRN
Status: DISCONTINUED | OUTPATIENT
Start: 2023-11-09 | End: 2023-11-29 | Stop reason: HOSPADM

## 2023-11-09 RX ORDER — FLUPHENAZINE HYDROCHLORIDE 1 MG/1
1 TABLET ORAL 2 TIMES DAILY PRN
Status: DISCONTINUED | OUTPATIENT
Start: 2023-11-09 | End: 2023-11-15

## 2023-11-09 RX ADMIN — DOXYCYCLINE HYCLATE 100 MG: 100 CAPSULE ORAL at 08:04

## 2023-11-09 RX ADMIN — FLUPHENAZINE HYDROCHLORIDE 2.5 MG: 2.5 TABLET, FILM COATED ORAL at 21:15

## 2023-11-09 RX ADMIN — CLONAZEPAM 1 MG: 1 TABLET ORAL at 08:00

## 2023-11-09 RX ADMIN — NICOTINE POLACRILEX 4 MG: 4 GUM, CHEWING BUCCAL at 17:32

## 2023-11-09 RX ADMIN — NICOTINE POLACRILEX 4 MG: 4 GUM, CHEWING BUCCAL at 10:53

## 2023-11-09 RX ADMIN — LITHIUM CARBONATE 900 MG: 450 TABLET, EXTENDED RELEASE ORAL at 21:14

## 2023-11-09 RX ADMIN — PROPRANOLOL HYDROCHLORIDE 10 MG: 10 TABLET ORAL at 19:08

## 2023-11-09 RX ADMIN — QUETIAPINE FUMARATE 200 MG: 200 TABLET ORAL at 21:13

## 2023-11-09 RX ADMIN — AMLODIPINE BESYLATE 10 MG: 5 TABLET ORAL at 08:05

## 2023-11-09 RX ADMIN — TESTOSTERONE 50 MG OF TESTOSTERONE: 50 GEL TRANSDERMAL at 08:04

## 2023-11-09 RX ADMIN — GABAPENTIN 1200 MG: 600 TABLET, FILM COATED ORAL at 08:04

## 2023-11-09 RX ADMIN — NICOTINE POLACRILEX 4 MG: 4 GUM, CHEWING BUCCAL at 14:29

## 2023-11-09 RX ADMIN — ONDANSETRON 4 MG: 4 TABLET, ORALLY DISINTEGRATING ORAL at 08:27

## 2023-11-09 RX ADMIN — NICOTINE POLACRILEX 4 MG: 4 GUM, CHEWING BUCCAL at 12:21

## 2023-11-09 RX ADMIN — PROPRANOLOL HYDROCHLORIDE 10 MG: 10 TABLET ORAL at 13:21

## 2023-11-09 RX ADMIN — ALUMINUM HYDROXIDE, MAGNESIUM HYDROXIDE, AND SIMETHICONE 30 ML: 200; 200; 20 SUSPENSION ORAL at 07:37

## 2023-11-09 RX ADMIN — NICOTINE POLACRILEX 4 MG: 4 GUM, CHEWING BUCCAL at 19:23

## 2023-11-09 RX ADMIN — PANTOPRAZOLE SODIUM 40 MG: 40 TABLET, DELAYED RELEASE ORAL at 08:02

## 2023-11-09 RX ADMIN — NICOTINE 1 PATCH: 21 PATCH, EXTENDED RELEASE TRANSDERMAL at 08:00

## 2023-11-09 RX ADMIN — POLYETHYLENE GLYCOL 3350 17 G: 17 POWDER, FOR SOLUTION ORAL at 08:04

## 2023-11-09 RX ADMIN — CLONAZEPAM 1 MG: 1 TABLET ORAL at 19:04

## 2023-11-09 RX ADMIN — GABAPENTIN 1200 MG: 600 TABLET, FILM COATED ORAL at 13:21

## 2023-11-09 RX ADMIN — OLANZAPINE 10 MG: 10 TABLET, FILM COATED ORAL at 12:20

## 2023-11-09 RX ADMIN — GABAPENTIN 1200 MG: 600 TABLET, FILM COATED ORAL at 19:04

## 2023-11-09 RX ADMIN — NICOTINE POLACRILEX 4 MG: 4 GUM, CHEWING BUCCAL at 08:53

## 2023-11-09 RX ADMIN — NICOTINE POLACRILEX 4 MG: 4 GUM, CHEWING BUCCAL at 06:08

## 2023-11-09 RX ADMIN — ROSUVASTATIN 40 MG: 20 TABLET, FILM COATED ORAL at 08:04

## 2023-11-09 RX ADMIN — NICOTINE POLACRILEX 4 MG: 4 GUM, CHEWING BUCCAL at 20:52

## 2023-11-09 RX ADMIN — OLANZAPINE 5 MG: 5 TABLET, FILM COATED ORAL at 21:13

## 2023-11-09 RX ADMIN — INSULIN GLARGINE 25 UNITS: 100 INJECTION, SOLUTION SUBCUTANEOUS at 08:05

## 2023-11-09 RX ADMIN — EMPAGLIFLOZIN 10 MG: 10 TABLET, FILM COATED ORAL at 08:05

## 2023-11-09 RX ADMIN — OLANZAPINE 10 MG: 10 TABLET, ORALLY DISINTEGRATING ORAL at 08:04

## 2023-11-09 RX ADMIN — TRETINOIN: 0.5 CREAM TOPICAL at 21:15

## 2023-11-09 RX ADMIN — DOXYCYCLINE HYCLATE 100 MG: 100 CAPSULE ORAL at 19:04

## 2023-11-09 ASSESSMENT — ACTIVITIES OF DAILY LIVING (ADL)
DRESS: STREET CLOTHES
ADLS_ACUITY_SCORE: 57
ORAL_HYGIENE: INDEPENDENT
ADLS_ACUITY_SCORE: 57
LAUNDRY: WITH SUPERVISION
ADLS_ACUITY_SCORE: 57
HYGIENE/GROOMING: INDEPENDENT
ADLS_ACUITY_SCORE: 57

## 2023-11-09 NOTE — PROGRESS NOTES
Brief Hospital Medicine Note:     Medicine team following up on right hand pain.     Nursing notes, vitals, and labs reviewed.     Discussed with RN this AM that patient's pain is doing ok and that patient opted to keep splint in place for comfort. Discussed that this could be removed if he chose to as there isn't an acute fracture. RN noted that patient denied any numbness or tingling in fingers of right hand or any other neurological changes. No further recommendations from Medicine at this time.     Medicine will sign off at this time. Thank you for the consult. If further questions arise, please reach out to Medicine Team.     Sangeeta Rosales PA-C  Hospitalist Service  Contact information available via [facility] Paging/Directory
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
## 2023-11-09 NOTE — PROGRESS NOTES
"  ----------------------------------------------------------------------------------------------------------  [facility]  Psychiatry Progress Note  Hospital Day #2     Interim History:     The patient's care was discussed with the treatment team and chart notes were reviewed.    Vitals: VSS  Sleep: 7 hours (11/09/23 0600)  Scheduled medications: Took all scheduled medications as prescribed  Psychiatric PRN medications: No psychiatric prns given    Staff Report:   No acute events or safety concerns overnight. He has been relatively withdrawn and isolated, taking meals in his room--possibly due to anxiety and some paranoia. Prakash had a severe episode of anxiety last night requiring PRN zyPREXA and his evening dose of klonopin. Prakash remains on SIO for history of agressive outbursts with self-harm. Please see staff notes for details. This AM staff reported episode of nausea with vomiting, requesting Zofran.      Subjective:     Patient Interview:  Prakash Prasad is a 33 year old male with a history of schizoaffective disorder (bipolar type), borderline personality disorder, ADHD, depression and polysubstance abuse (opioids, methamphetamine, nicotine, thc) who presented to the ED 11/2 with concerns for haven and worsening psychosis. The team met with Prakash in his room today. He is well-groomed, wearing jogger pants and a hooded sweatshirt.     Prakash provided the team with an itemized list of information and requests related to his medications. Prakash reports that \"Zyprexa doesn't work\", \"Klonopin super effective but wears off around noonish [increasing anxiety] - wondering if could get 3rd dose [in afternoon]\", \"zero motivation/focus\", \"propranolol PRN?\", \"ECT?\". We first discussed the indications for ECT, explaining that it is primarily used to treat depression but can sometimes help with haven and anxiety. The team told Prakash that the priority now is to first manage " "his haven and anxiety with medications before potentially considering ECT down the road. Prakash was very appreciative that the team did not dismiss the idea outright, saying that it made him \"feel heard and respected\".  Prakash was understanding that working with his medications is the next best step.    Prakash reports that his hand is feeling better and was glad to hear that there was no fracture. He is no longer wearing a wrap or sling and reports the pain as a 3/10.    Prakash's anxiety remains high and he continues to experience auditory hallucinations in the form of self-deprecating voices. While the voices are less commanding and loud compared to his admission to the ED, he says that they are \"putting him down\", telling him he's a \"bad person\", and \"talking shit about other people\".    The rest of the interview largely addresses possible medication changes. Prakash expresses openness to modifying his medications but consistently reports that klonopin is the \"only med currently helping me\". Prakash communicates that he has taken long-acting antipsychotics in the past, specifically prolixin DOMINGUEZ. He says that when he was on prolixin, he experiences the \"most stable period of my life\". He is interested and open to restarting this medication PO and switching eventually to the DOMINGUEZ. Prakash says that propranolol works well for him for anxiety and he'd like it PRN; the team agrees.    The team discusses additional medication changes (summarized below). Prakash agrees to these changes, however, when a possible benzo taper with phenobarbital is proposed, Prakash is immediately opposed to the idea. He expresses that he does not want to change his klonopin right now. While frustrated, Prakash understands why the team considered the taper. He wants to stay on his current dosing of klonopin for at least 2 days before reconsidering the taper.    Prakash does not report any palpitations, SOB, chest pain. He did have acute onset " "nausea/vomiting this morning that has since resolved with PRN zofran. He feels it was due to anxiety.     Patient was asked about allergy to risperidone and he states that he doesn't understand why many medications are listed under allergies in his chart. He denied allergy to this med.     ROS:  Patient has  agitation/impulse to move, nausea, vomit, and pain that is reduced in right hand   Patient denies constipation (last bowel movement? Not asked), SOB, and chest pain     Objective:     Vitals:  BP (!) 137/91   Pulse 108   Temp 97.7  F (36.5  C) (Oral)   Resp 16   Ht 1.702 m (5' 7\")   Wt 105.2 kg (231 lb 14.4 oz)   SpO2 97%   BMI 36.32 kg/m      Allergies:  Allergies   Allergen Reactions    Haldol [Haloperidol] Other (See Comments)     Makes patient very angry and anxious    Adhesive Tape Hives    Percocet [Oxycodone-Acetaminophen] Nausea and Vomiting    Prednisone Other (See Comments) and Hives     Suicidal ideation    Risperidone Other (See Comments)    Tramadol Hcl Nausea and Vomiting    Droperidol Anxiety    Seroquel [Quetiapine] Palpitations     Spent 2 weeks in the hospital due to having seroquel, caused palpitations and QT prolongation       Current Medications:  Scheduled:  Current Facility-Administered Medications   Medication Dose Route Frequency    amLODIPine  10 mg Oral Daily    clonazePAM  1 mg Oral BID    doxycycline hyclate  100 mg Oral Q12H [facility] (08/20)    empagliflozin  10 mg Oral Daily    fluPHENAZine  2.5 mg Oral At Bedtime    gabapentin  1,200 mg Oral TID    insulin glargine  25 Units Subcutaneous QAM AC    lithium ER  900 mg Oral At Bedtime    nicotine  1 patch Transdermal Daily    nicotine   Transdermal Q8H    [START ON 11/10/2023] OLANZapine  10 mg Oral Daily    OLANZapine  5 mg Oral At Bedtime    pantoprazole  40 mg Oral Daily    polyethylene glycol  17 g Oral Daily    propranolol  10 mg Oral TID    QUEtiapine  200 mg Oral At Bedtime    rosuvastatin  40 mg Oral Daily    testosterone  " 50 mg of testosterone Transdermal Daily    tretinoin   Topical At Bedtime       PRN:  Current Facility-Administered Medications   Medication Dose Route Frequency    acetaminophen  650 mg Oral Q4H PRN    alum & mag hydroxide-simethicone  30 mL Oral Q4H PRN    artificial saliva  15 mL Swish & Spit 4x Daily PRN    glucose  15-30 g Oral Q15 Min PRN    Or    dextrose  25-50 mL Intravenous Q15 Min PRN    Or    glucagon  1 mg Subcutaneous Q15 Min PRN    fluPHENAZine  1 mg Oral BID PRN    hydrALAZINE  10 mg Oral 4x Daily PRN    melatonin  3 mg Oral At Bedtime PRN    nicotine polacrilex  4 mg Buccal Q1H PRN    OLANZapine  10 mg Oral TID PRN    Or    OLANZapine  10 mg Intramuscular TID PRN    sulindac  200 mg Oral BID PRN       Labs and Imaging:  New results:   Recent Results (from the past 24 hour(s))   Glucose by meter    Collection Time: 11/08/23  5:51 PM   Result Value Ref Range    GLUCOSE BY METER POCT 139 (H) 70 - 99 mg/dL   Glucose by meter    Collection Time: 11/09/23  6:42 AM   Result Value Ref Range    GLUCOSE BY METER POCT 167 (H) 70 - 99 mg/dL   Glucose by meter    Collection Time: 11/09/23 11:46 AM   Result Value Ref Range    GLUCOSE BY METER POCT 151 (H) 70 - 99 mg/dL     Component      Latest Ref [facility] 8/22/2023  7:09 AM 8/28/2023  8:58 AM 9/26/2023  12:47 PM 11/7/2023  9:56 AM   Lithium Level      0.60 - 1.20 mmol/L 0.50 (L)  0.40 (L)  0.21 (L)  0.42 (L)       Component      Latest Ref Rn 10/27/2023  10:44 AM 10/29/2023  11:38 PM   Amphetamine Qual Urine      Screen Negative  Screen Negative  Screen Negative    Barbiturates Qual Urine      Screen Negative  Screen Negative  Screen Negative    Cannabinoids Qual Urine      Screen Negative  Screen Positive !  Screen Positive !    Opiates Qualitative Urine      Screen Negative  Screen Negative  Screen Negative    Benzodiazepine Urine      Screen Negative  Screen Negative  Screen Positive !    Cocaine Urine      Screen Negative  Screen Negative  Screen Negative   "  Fentanyl Qual Urine      Screen Negative  Screen Negative  Screen Negative    PCP Urine      Screen Negative  Screen Negative  Screen Negative        Data this admission:  - Lithium low on 11/7 collection  - CBC unremarkable  - CMP unremarkable  - TSH normal  - Lipids: elevated triglycerides, low HDL  - Vit B12 normal  - Folate normal  - UDS from 11/7 not collected  - HCG  not collected  - UA  not collected       Mental Status Exam:     Oriented to:  Grossly Oriented, Person/Self, Situation, and Unit/Floor  General:  Awake, Alert, and restless  Appearance:  Grooming is adequate, Dressed in glasses and hoodie, and Hair is cut short. Right forearm and hand is wrapped and in a sling. Tattoo on left forearm visible. Larger body habitus  Behavior/Attitude:  Cooperative and Easy to redirect  Eye Contact: Glances  Psychomotor: Restless, leg shaking no catatonia present, somewhat improved from yesterday  Speech:  appropriate volume/tone  Language: Fluent in English with appropriate syntax and vocabulary.  Mood:  \"still feeling anxious\"  Affect:  congruent with mood and flat  Thought Process:  linear and coherent  Thought Content:    No SI/SIB/VH, but ongoing AH ; No apparent delusions  Associations:  intact  Insight:  fair due to shared history and in-hospital adherence to medication, but some collateral information from outpatient psychiatrist inconsistent.   Judgment:  partial due to recent aggressive self-harm behavior (punching walls, banging head)  Impulse control: fair  Attention Span:  adequate for conversation  Concentration:  grossly intact and limited per patient expressed experience  Recent and Remote Memory:  grossly intact  Fund of Knowledge: average  Muscle Strength and Tone:  not assessed  Gait and Station:  normal but constant pacing back-n-forth and side-to-side     Psychiatric Assessment     Ayana Prasad \"Prakash\" is a 33 year old adult previously diagnosed with schizoaffective disorder bipolar type, " BPD, AVA, PTSD, ADHD, gender dysphoria, and polysubstance use disorder (opioids, THC, amphetamine, MDMA, nicotine) who presented voluntarily with increasing paranoia, AH, and manic behavior in the context of medication non-adherence and Ativan overdose. Most recent psychiatric hospitalization was at [facility] from 8/8-8/28/23. Significant symptoms on admission included subjective hypomania and anxiety. The MSE on admission was pertinent for absence of overt haven, flat affect, and auditory hallucinations. Biological contributions to presentation include prior diagnoses of schizoaffective disorder, AVA, PTSD, and ADHD. Psychological contributions to presentation include prior diagnosis of BPD. Social factors contributing to presentation include stressful environment in his group home, which provides limited support for medication management. Protective factors include being engaged in treatment.     In summary, the patient's reported previous symptoms of manic behavior, paranoia, auditory hallucinations in the context of medication non-adherence and Ativan overdose are consistent with schizoaffective disorder, bipolar type. He will likely benefit from medication management this admission.     Given that he currently has resolving haven and auditory hallucinations, patient warrants inpatient psychiatric hospitalization to maintain his safety. In addition, patient is under civil commitment through [facility] and his provisional discharge was revoked on 10/31/23 for med noncompliance and increasing psychosis symptoms.     Psychiatric Plan by Diagnosis      Today's changes:  - prolixin oral, daily at bedtime, 2.5mg  - prolixin oral, 1mg BID PRN (1st line for anxiety/agitation)  - zyPREXA (15mg total), 10mg AM, 5mg @ bedtime/PM  - Seraquil reduced to 200mg, bedtime  - zyPREXA PRN moved to 2nd line for anxiety/agitation  - Biotene, 15mL, 4x daily PRN  - Propranolol, 10mg, TID  - Li levels tomorrow 11/10    #  Schizoaffective disorder, bipolar type  #Anxiety  #BPD  1. Medications:  - lithium ER, 900mg  - clonazepam 1 mg BID PRN  - olanzapine, 10 mg, AM  - olanzapine, 5 mg, bedtime  - quetiapine, 200mg, bedtime  - prolixin oral, daily at bedtime, 2.5mg  - prolixin oral, 1mg BID PRN (1st line for anxiety/agitation)  - Propranolol, 10mg, TID     2. Pertinent Labs/Monitoring:   - lithium levels, last checked 11/7, planned redraw tomorrow 11/10  - UDS pending  - EKG, 11/7      3. Additional Plans:  - Patient will be treated in therapeutic milieu with appropriate individual and group therapies as described  - Consider lamotrigine for dual benefit of mood stabilization and treatment of nerve pain       Psychiatric Hospital Course:      Prakash Prasad was admitted to [facility] as a voluntary patient.   Medications:  PTA lithium, clonazepam, olanzapine, quetiapine, amlodipine, gabapentin, insulin glargine, empagliflozin, pantoprazole, rosuvastatin, testosterone & tretinoin were continued from ED.    New medications started at the time of admission include sulindac PRN for hand pain.   11/9: started on prolixin (2.5mg daily with 1mg BID PRN) with reductions in Seroquel (200mg) and zyPREXA (15mg total) doses. Propranolol added 10mg TID. Biotene requested and ordered.  Lithium levels low on admission, plan to redraw tomorrow 11/10    The risks, benefits, alternatives, and side effects were discussed and understood by the patient.     Medical Assessment and Plan     Medical diagnoses to be addressed this admission:    # ongoing right hand pain  - x-ray ordered, 11/8 --> no fracture  - On sulindac, 200mg BID PRN (NSAID approved for use with lithium)  - internal medicine consulted, 11/8  - consult from ortho, recommendation for outpatient f/u with referral to hand surgeon if symptoms persist    #Nausea/vomiting on 11/9 - Last EKG wnl, vitals stable, BS was 160's at the time of N/V. Pt denied HA, CP, SOB. Pt believes this  episode was due to anxiety.   - Prn zofran.     Medical course: Patient was physically examined by the ED prior to being transferred to the unit and was found to be medically stable and appropriate for admission. Internal medicine has been consulted for management of right hand pain after patient punched window last night 11/7, same hand injured in ED after pt punched a wall, XR in ED was negative for fracture.     Consults:   - internal medicine for treatment of right hand pain after punching window  - ortho consult as well for hand evaluation, could provide outpt follow up if pain persists.      Checklist     Legal Status: Committed     Safety Assessment:   Behavioral Orders   Procedures    Assault precautions    Code 1 - Restrict to Unit    Routine Programming     As clinically indicated    Status 15     Every 15 minutes.    Status Individual Observation     Patient SIO status reviewed with team/RN.  Please also refer to RN/team documentation for add'l detail.    Patient on 1:1. Must have door open due to suicide risk.     -SIO staff to monitor following which have contributed to patient being on SIO:  SI, manic/impulse-control concerns, self-injurious behavior (repeated wall punching)  -Possible interventions SIO staff could use to support patient's treatment progress:  Supportive care  -When following observed, team will review discontinuation of SIO:  No self-injurious behaviors, no escalation     Order Specific Question:   CONTINUOUS 24 hours / day     Answer:   Other     Order Specific Question:   Specify distance     Answer:   10 feet     Order Specific Question:   Indications for SIO     Answer:   Suicide risk    Suicide precautions     Patients on Suicide Precautions should have a Combination Diet ordered that includes a Diet selection(s) AND a Behavioral Tray selection for Safe Tray - with utensils, or Safe Tray - NO utensils         Risk Assessment:  Risk for harm is moderate.  Risk factors:  impulsive  Protective factors: engaged in treatment     SIO: Yes    Disposition: Pending stabilization, medication optimization, & development of a safe discharge plan.     Attestations     Resident with med student: Chicho Ramos, MS3  [facility] [employer] Medical Student     I was present with the medical student who participated in the service and in the documentation of the note.  I have verified the history and personally performed the physical exam and medical decision making. I agree with the assessment and plan of care as documented in the note.    Sunni Zelaya MD  Psychiatry Resident Physician    This patient has been seen and evaluated by me, Jeniffer Wade DO.  I have discussed this patient with the team including the resident and medical student(s) and I agree with the findings and plan in this note.  Dr. Jeniffer Wade DO, DUC

## 2023-11-09 NOTE — PLAN OF CARE
BEH IP Unit Acuity Rating Score (UARS)  Patient is given one point for every criteria they meet.     CRITERIA SCORING   On a 72 hour hold, court hold, committed, stay of commitment, or revocation 1    Patient LOS on BEH unit exceeds 20 days 0  LOS: 2   Patient under guardianship, 55+, otherwise medically complex, or under age 11 0   Suicide ideation without relief of precipitating factors 0   Current plan for suicide 0   Current plan for homicide 0   Imminent risk or actual attempt to seriously harm another without relief of factors precipitating the attempt 0   Severe dysfunction in daily living (ex: complete neglect for self care, extreme disruption in vegetative function, extreme deterioration in social interactions) 1   Recent (last 7 days) or current physical aggression in the ED or on unit 1   Restraints or seclusion episode in past 72 hours 1   Recent (last 7 days) or current verbal aggression, agitation, yelling, etc., while in the ED or unit 0   Active psychosis 1   Need for constant or near constant redirection (from leaving, from others, etc).  0   Intrusive or disruptive behaviors 0   TOTAL 5

## 2023-11-09 NOTE — PLAN OF CARE
Problem: Adult Behavioral Health Plan of Care  Goal: Plan of Care Review  Outcome: Progressing  Flowsheets (Taken 11/9/2023 1700)  Patient Agreement with Plan of Care: agrees     Problem: Psychotic Signs/Symptoms  Goal: Improved Behavioral Control (Psychotic Signs/Symptoms)  Outcome: Progressing   Goal Outcome Evaluation:    Plan of Care Reviewed With: patient      Patient was visible in the lounge area through most of the shift. His mood was anxious and his affect was flat. No self injury behavior or safety concerns were observed, but patient remained on SIO for safety. He endorsed an anxiety of 7/10 through most of the shift and requested and was given scheduled Zyprexa, klonopin, and gabapentin with little or no effect, as  evidence by patient asking for more anxiety medications. He endorsed  mild auditory hallucinations but said it was manageable. He ate 100% dinner and partly attended  evening group sessions. BP @ HS was elevated:148/91 and scheduled propranolol given pending recheck. BG was 104 mg/dl @ dinner and 136 mg/dl@ HS with no intervention necessary. Will continue to monitor and encourage.

## 2023-11-09 NOTE — PLAN OF CARE
Assessment/Intervention/Current Symtoms and Care Coordination:  Chart reviewed and patient met with team,   Discussed patient progress, symptomology, and response to treatment.  Discussed the discharge plan and any potential impediments to discharge.    Patient has c/o anxiety -states he has tried utilizing coping skills but states only benzos helps.Is open to MD's med recs.  Patient was mostly isolated to room during the early part of the day  but he was out in lounge more later.  Patient continues on 1:1   Writer left msg for CM to  coordinate care and inquire more about GH and alternate placement     Discharge Plan or Goal:  GH Placement  Psychiatry  Therapy    Barriers to Discharge:  Severity of symptoms  Need for med mgmt per MD's  PLacement unclear    Referral Status:  None today    Legal Status:  Committed- PD revoked 10/31/23    [facility]: [place]  File Number:  [id number]  Start and expiration date of commitment:   6/9/23 - 12/9/23?    Contacts:  Outpatient Psychiatrist: Regine Last CNP  Psychiatry  Primary Physician: KELIN Edwards  [facility] : Cristy Ji [phone]  Family Members: Negra Prasad ([phone])     Upcoming Meetings and Dates/Important Information and next steps:    Team Update:   Wed.

## 2023-11-09 NOTE — PLAN OF CARE
Problem: Psychotic Signs/Symptoms  Goal: Improved Behavioral Control (Psychotic Signs/Symptoms)  Outcome: Not Progressing     Problem: Psychotic Signs/Symptoms  Goal: Optimal Cognitive Function (Psychotic Signs/Symptoms)  Intervention: Support and Promote Cognitive Ability  Recent Flowsheet Documentation  Taken 11/9/2023 1230 by Paty Lovett, RN  Trust Relationship/Rapport:   care explained   choices provided   emotional support provided   empathic listening provided   questions encouraged   questions answered   reassurance provided   thoughts/feelings acknowledged   Patient up and visible in Milieu, SIO staff in place for SIB and safety, patient presented with flat affect, mood anxious and frequently requesting medications for anxiety. Patient endorsed anxiety 6/10, no relief after schedule medications, PRN Zyprexa given and still no relief. Started on Propanolol and medication was effective, patient had multiple changes to medications, see MAR for changes. Patient requested for print out of med list and was given to him. He endorsed auditory hallucinations with impulse to hit self, staff intervene to help redirect patient with some coping skills and he was receptive to redirection. Patient listening to music and able to relax in room. Continue to monitor.

## 2023-11-10 LAB
GLUCOSE BLDC GLUCOMTR-MCNC: 119 MG/DL (ref 70–99)
GLUCOSE BLDC GLUCOMTR-MCNC: 121 MG/DL (ref 70–99)
GLUCOSE BLDC GLUCOMTR-MCNC: 169 MG/DL (ref 70–99)
LITHIUM SERPL-SCNC: 0.68 MMOL/L (ref 0.6–1.2)

## 2023-11-10 PROCEDURE — 250N000013 HC RX MED GY IP 250 OP 250 PS 637

## 2023-11-10 PROCEDURE — 250N000013 HC RX MED GY IP 250 OP 250 PS 637: Performed by: STUDENT IN AN ORGANIZED HEALTH CARE EDUCATION/TRAINING PROGRAM

## 2023-11-10 PROCEDURE — 90834 PSYTX W PT 45 MINUTES: CPT

## 2023-11-10 PROCEDURE — A9270 NON-COVERED ITEM OR SERVICE: HCPCS

## 2023-11-10 PROCEDURE — 124N000002 HC R&B MH UMMC

## 2023-11-10 PROCEDURE — 99233 SBSQ HOSP IP/OBS HIGH 50: CPT | Mod: GC | Performed by: PSYCHIATRY & NEUROLOGY

## 2023-11-10 PROCEDURE — 90853 GROUP PSYCHOTHERAPY: CPT

## 2023-11-10 PROCEDURE — 80178 ASSAY OF LITHIUM: CPT

## 2023-11-10 PROCEDURE — 90791 PSYCH DIAGNOSTIC EVALUATION: CPT | Performed by: COUNSELOR

## 2023-11-10 PROCEDURE — 36415 COLL VENOUS BLD VENIPUNCTURE: CPT

## 2023-11-10 PROCEDURE — 250N000013 HC RX MED GY IP 250 OP 250 PS 637: Performed by: PHYSICIAN ASSISTANT

## 2023-11-10 RX ORDER — FLUPHENAZINE HYDROCHLORIDE 2.5 MG/1
5 TABLET ORAL AT BEDTIME
Status: COMPLETED | OUTPATIENT
Start: 2023-11-12 | End: 2023-11-14

## 2023-11-10 RX ORDER — DOCUSATE SODIUM 100 MG/1
100 CAPSULE, LIQUID FILLED ORAL 2 TIMES DAILY
Status: DISCONTINUED | OUTPATIENT
Start: 2023-11-10 | End: 2023-11-29 | Stop reason: HOSPADM

## 2023-11-10 RX ORDER — FLUPHENAZINE HYDROCHLORIDE 2.5 MG/1
10 TABLET ORAL AT BEDTIME
Status: DISCONTINUED | OUTPATIENT
Start: 2023-11-15 | End: 2023-11-15

## 2023-11-10 RX ORDER — FLUPHENAZINE HYDROCHLORIDE 2.5 MG/1
2.5 TABLET ORAL AT BEDTIME
Status: COMPLETED | OUTPATIENT
Start: 2023-11-10 | End: 2023-11-11

## 2023-11-10 RX ADMIN — NICOTINE POLACRILEX 4 MG: 4 GUM, CHEWING BUCCAL at 18:23

## 2023-11-10 RX ADMIN — GABAPENTIN 1200 MG: 600 TABLET, FILM COATED ORAL at 08:08

## 2023-11-10 RX ADMIN — GABAPENTIN 1200 MG: 600 TABLET, FILM COATED ORAL at 13:07

## 2023-11-10 RX ADMIN — INSULIN GLARGINE 25 UNITS: 100 INJECTION, SOLUTION SUBCUTANEOUS at 08:08

## 2023-11-10 RX ADMIN — OLANZAPINE 5 MG: 5 TABLET, FILM COATED ORAL at 21:04

## 2023-11-10 RX ADMIN — PANTOPRAZOLE SODIUM 40 MG: 40 TABLET, DELAYED RELEASE ORAL at 06:31

## 2023-11-10 RX ADMIN — DOXYCYCLINE HYCLATE 100 MG: 100 CAPSULE ORAL at 19:14

## 2023-11-10 RX ADMIN — NICOTINE POLACRILEX 4 MG: 4 GUM, CHEWING BUCCAL at 08:22

## 2023-11-10 RX ADMIN — NICOTINE 1 PATCH: 21 PATCH, EXTENDED RELEASE TRANSDERMAL at 08:08

## 2023-11-10 RX ADMIN — DOCUSATE SODIUM 100 MG: 100 CAPSULE, LIQUID FILLED ORAL at 12:02

## 2023-11-10 RX ADMIN — NICOTINE POLACRILEX 4 MG: 4 GUM, CHEWING BUCCAL at 15:47

## 2023-11-10 RX ADMIN — OLANZAPINE 10 MG: 10 TABLET, FILM COATED ORAL at 08:07

## 2023-11-10 RX ADMIN — PROPRANOLOL HYDROCHLORIDE 10 MG: 10 TABLET ORAL at 19:14

## 2023-11-10 RX ADMIN — NICOTINE POLACRILEX 4 MG: 4 GUM, CHEWING BUCCAL at 11:46

## 2023-11-10 RX ADMIN — GABAPENTIN 1200 MG: 600 TABLET, FILM COATED ORAL at 19:14

## 2023-11-10 RX ADMIN — NICOTINE POLACRILEX 4 MG: 4 GUM, CHEWING BUCCAL at 19:55

## 2023-11-10 RX ADMIN — NICOTINE POLACRILEX 4 MG: 4 GUM, CHEWING BUCCAL at 06:32

## 2023-11-10 RX ADMIN — TRETINOIN: 0.5 CREAM TOPICAL at 21:05

## 2023-11-10 RX ADMIN — PROPRANOLOL HYDROCHLORIDE 10 MG: 10 TABLET ORAL at 08:07

## 2023-11-10 RX ADMIN — CLONAZEPAM 1 MG: 1 TABLET ORAL at 19:17

## 2023-11-10 RX ADMIN — AMLODIPINE BESYLATE 10 MG: 5 TABLET ORAL at 08:07

## 2023-11-10 RX ADMIN — FLUPHENAZINE HYDROCHLORIDE 2.5 MG: 2.5 TABLET, FILM COATED ORAL at 21:05

## 2023-11-10 RX ADMIN — EMPAGLIFLOZIN 10 MG: 10 TABLET, FILM COATED ORAL at 08:09

## 2023-11-10 RX ADMIN — FLUPHENAZINE HYDROCHLORIDE 1 MG: 1 TABLET, FILM COATED ORAL at 12:05

## 2023-11-10 RX ADMIN — LITHIUM CARBONATE 900 MG: 450 TABLET, EXTENDED RELEASE ORAL at 21:04

## 2023-11-10 RX ADMIN — POLYETHYLENE GLYCOL 3350 17 G: 17 POWDER, FOR SOLUTION ORAL at 08:08

## 2023-11-10 RX ADMIN — CLONAZEPAM 1 MG: 1 TABLET ORAL at 08:08

## 2023-11-10 RX ADMIN — DOXYCYCLINE HYCLATE 100 MG: 100 CAPSULE ORAL at 08:08

## 2023-11-10 RX ADMIN — QUETIAPINE FUMARATE 200 MG: 200 TABLET ORAL at 21:05

## 2023-11-10 RX ADMIN — DOCUSATE SODIUM 100 MG: 100 CAPSULE, LIQUID FILLED ORAL at 19:14

## 2023-11-10 RX ADMIN — TESTOSTERONE 50 MG OF TESTOSTERONE: 50 GEL TRANSDERMAL at 08:08

## 2023-11-10 RX ADMIN — ACETAMINOPHEN 650 MG: 325 TABLET, FILM COATED ORAL at 03:32

## 2023-11-10 RX ADMIN — PROPRANOLOL HYDROCHLORIDE 10 MG: 10 TABLET ORAL at 13:07

## 2023-11-10 RX ADMIN — NICOTINE POLACRILEX 4 MG: 4 GUM, CHEWING BUCCAL at 09:57

## 2023-11-10 RX ADMIN — ROSUVASTATIN 40 MG: 20 TABLET, FILM COATED ORAL at 08:07

## 2023-11-10 ASSESSMENT — ACTIVITIES OF DAILY LIVING (ADL)
ADLS_ACUITY_SCORE: 57
LAUNDRY: WITH SUPERVISION
DRESS: STREET CLOTHES
ADLS_ACUITY_SCORE: 42
ADLS_ACUITY_SCORE: 42
ADLS_ACUITY_SCORE: 57
HYGIENE/GROOMING: INDEPENDENT
ADLS_ACUITY_SCORE: 42
ADLS_ACUITY_SCORE: 57
DRESS: INDEPENDENT;STREET CLOTHES
ORAL_HYGIENE: INDEPENDENT
HYGIENE/GROOMING: INDEPENDENT
ORAL_HYGIENE: INDEPENDENT
ADLS_ACUITY_SCORE: 42
ADLS_ACUITY_SCORE: 42
LAUNDRY: WITH SUPERVISION
ADLS_ACUITY_SCORE: 42
ADLS_ACUITY_SCORE: 42
ADLS_ACUITY_SCORE: 57
ADLS_ACUITY_SCORE: 42

## 2023-11-10 ASSESSMENT — ANXIETY QUESTIONNAIRES
GAD7 TOTAL SCORE: 21
GAD7 TOTAL SCORE: 21
7. FEELING AFRAID AS IF SOMETHING AWFUL MIGHT HAPPEN: NEARLY EVERY DAY
6. BECOMING EASILY ANNOYED OR IRRITABLE: NEARLY EVERY DAY
4. TROUBLE RELAXING: NEARLY EVERY DAY
1. FEELING NERVOUS, ANXIOUS, OR ON EDGE: NEARLY EVERY DAY
3. WORRYING TOO MUCH ABOUT DIFFERENT THINGS: NEARLY EVERY DAY
2. NOT BEING ABLE TO STOP OR CONTROL WORRYING: NEARLY EVERY DAY
5. BEING SO RESTLESS THAT IT IS HARD TO SIT STILL: NEARLY EVERY DAY

## 2023-11-10 ASSESSMENT — COLUMBIA-SUICIDE SEVERITY RATING SCALE - C-SSRS
4. HAVE YOU HAD THESE THOUGHTS AND HAD SOME INTENTION OF ACTING ON THEM?: NO
5. HAVE YOU STARTED TO WORK OUT OR WORKED OUT THE DETAILS OF HOW TO KILL YOURSELF? DO YOU INTEND TO CARRY OUT THIS PLAN?: NO
3. HAVE YOU BEEN THINKING ABOUT HOW YOU MIGHT KILL YOURSELF?: NO
1. IN THE PAST MONTH, HAVE YOU WISHED YOU WERE DEAD OR WISHED YOU COULD GO TO SLEEP AND NOT WAKE UP?: YES
2. HAVE YOU ACTUALLY HAD ANY THOUGHTS OF KILLING YOURSELF IN THE PAST MONTH?: YES
6. HAVE YOU EVER DONE ANYTHING, STARTED TO DO ANYTHING, OR PREPARED TO DO ANYTHING TO END YOUR LIFE?: NO

## 2023-11-10 ASSESSMENT — PATIENT HEALTH QUESTIONNAIRE - PHQ9: SUM OF ALL RESPONSES TO PHQ QUESTIONS 1-9: 6
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
## 2023-11-10 NOTE — PROGRESS NOTES
LOCUS Worksheet     Name: Ayana Prasad MRN: [id number]    : 1990      Gender:  Transgender female to male    PMI:  NA   Provider Name: [facility]   Provider NPI:  [id number]    Actual level of Care Provided:  psychiatry    Service(s) receiving or referred to:  [facility]-psychosis group.    Reason for Variance: Due to worsening mental health symptoms and suicidal ideations.      Rating completed by: CINTHYA Mix/JESUS      I. Risk of Harm:   2      Low Risk of Harm    II. Functional Status:   3      Moderate Impairment    III. Co-Morbidity:   2      Minor Co-Morbidity    IV - A. Recovery Environment - Level of Stress:   2      Mildly Stressful Environment    IV - B. Recovery Environment - Level of Support:   3      Limited Support in Environment    V. Treatment and Recovery History:   2      Significant Response to Treatment and Recovery Management    VI. Engagement and Recovery Project:   3      Limited Engagement and Recovery       17 Composite Score    Level of Care Recommendation:   17 to 19       High Intensity Community Based Services
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
## 2023-11-10 NOTE — PLAN OF CARE

## 2023-11-10 NOTE — PROGRESS NOTES
"  ----------------------------------------------------------------------------------------------------------  [facility]  Psychiatry Progress Note  Hospital Day #3     Interim History:     The patient's care was discussed with the treatment team and chart notes were reviewed.    Vitals: VSS  Sleep: 5.25 hours (11/10/23 0612), woke at ~3AM per patient and could not fall back asleep  Scheduled medications: Took all scheduled medications as prescribed  Psychiatric PRN medications: No psychiatric prns given    Staff Report:   No acute events or safety concerns overnight. Prakash had some anxiety last night requesting PRN zyPREXA without major effect. Anxiety reduced to 3/10 in AM with reduced BP. Prakash remains on SIO for history of agressive outbursts with self-harm. Prakash shared with staff that he wants to continue SIO and is worried in the context of AH commanding him to hurt someone. Please see staff notes for details. Reported constipation (4 days since last BM), requesting colace to help with miralax.     Subjective:     Patient Interview:  Prakash Prasad is a 33 year old male with a history of schizoaffective disorder (bipolar type), borderline personality disorder, ADHD, depression and polysubstance abuse (opioids, methamphetamine, nicotine, thc) who presented to the ED 11/2 with concerns for haven and worsening psychosis. The team met with Prakash in his room today. He is well-groomed, wearing jogger pants and a T shirt.     Prakash wished to discuss three items: \"1) biotene, 2) klonopin scheduled vs. PRN, 3) voices (telling me to hurt people)\". Team also discussed discharge planning and checked in on reported constipation.    Biotene: order has been placed and the team communicated with Prakash that it is available at his request.    Klonopin: the team clarified that Klonopin must remain scheduled not prn to avoid withdrawal. Team also discussed that while klonopin " "has been helping with anxiety symptoms, it is not a long-term solution and that the team is not comfortable with continuing the klonopin and would like to start a taper either now or Monday at the latest. Prakash understood the teams reasoning. He reiterated that he could just get klonopin \"out in the community\", but agreed to start the taper on Monday. Did not want to go the route of phenobarbital taper because he said he has done it before and he prefers the Klonopin.     Voices: Prakash reports new AH that are telling him to hurt other people, specifically his SIO. Started last night. He is particularly disturbed by these voices since typically they command self-harm or SI (which he does not have at this time), but not harm to others. Prakash says that he was able to redirect these thoughts by pacing, coloring, and talking to the SIO about them. He said he feels shame around these voices, feeling that it is evidence he's a bad person. He does not want to be violent or repeat events that occurred during his ED stay he said leading to this hospitalization. The team congratulated him on the use of these coping skills, communicating with Prakash that his ability to resist the AH commands/impulse represents a separation between the voices and his own desire to NOT be violent, and that he is not a bad person. Prakash appreciated the perspective, stating that it can be difficult to separate the voices and his own intent.  The team encouraged him to continue utilizing his skills and provided a TIPS guide sheet.    Discharge planning: The team asked Prakash what his views and goals are for discharge planning. Prakash reported that he has been unable to connect with his , Cristy. He communicated worries about moving from his current group home, which he has been at for 3 years. He's worried about moving away from his community and not knowing if all his furniture will fit in the new home. He has a dog named \"nugget\" " "that he misses and is working with staff to see if she can visit on the unit. Prakash is also interested in moving his outpatient services to [facility]. Process in underway to enroll in outpatient day treatment program. He is also in need of a therapist. He is also interested in getting on the list for DBT, attending the day program in the meantime which appears to have openings now.     Prakash asked about his lithium level. The team shared that it was 0.68 after this morning's blood draw. Prakash reports that he was previously on 1200mg, which worked well. Would like to revisit going up on lithium on Monday.     ROS:  Patient has  agitation/impulse to move and new bruises on thumb without decreased mobility - team encouraged pt to do a self examination to look for other bruising and constipation (last bowel movement? Four days ago)  Patient denies SOB and chest pain     Objective:     Vitals:  /85 (BP Location: Right arm, Patient Position: Sitting, Cuff Size: Adult Regular)   Pulse 95   Temp 97.6  F (36.4  C) (Oral)   Resp 18   Ht 1.702 m (5' 7\")   Wt 105.2 kg (231 lb 14.4 oz)   SpO2 96%   BMI 36.32 kg/m      Allergies:  Allergies   Allergen Reactions    Haldol [Haloperidol] Other (See Comments)     Makes patient very angry and anxious    Adhesive Tape Hives    Percocet [Oxycodone-Acetaminophen] Nausea and Vomiting    Prednisone Other (See Comments) and Hives     Suicidal ideation    Risperidone Other (See Comments)    Tramadol Hcl Nausea and Vomiting    Droperidol Anxiety    Seroquel [Quetiapine] Palpitations     Spent 2 weeks in the hospital due to having seroquel, caused palpitations and QT prolongation       Current Medications:  Scheduled:  Current Facility-Administered Medications   Medication Dose Route Frequency    amLODIPine  10 mg Oral Daily    clonazePAM  1 mg Oral BID    docusate sodium  100 mg Oral BID    doxycycline hyclate  100 mg Oral Q12H [facility] (08/20)    empagliflozin  10 mg Oral Daily "    fluPHENAZine  2.5 mg Oral At Bedtime    gabapentin  1,200 mg Oral TID    insulin glargine  25 Units Subcutaneous QAM AC    lithium ER  900 mg Oral At Bedtime    nicotine  1 patch Transdermal Daily    nicotine   Transdermal Q8H    OLANZapine  10 mg Oral Daily    OLANZapine  5 mg Oral At Bedtime    pantoprazole  40 mg Oral Daily    polyethylene glycol  17 g Oral Daily    propranolol  10 mg Oral TID    QUEtiapine  200 mg Oral At Bedtime    rosuvastatin  40 mg Oral Daily    testosterone  50 mg of testosterone Transdermal Daily    tretinoin   Topical At Bedtime       PRN:  Current Facility-Administered Medications   Medication Dose Route Frequency    acetaminophen  650 mg Oral Q4H PRN    alum & mag hydroxide-simethicone  30 mL Oral Q4H PRN    artificial saliva  15 mL Swish & Spit 4x Daily PRN    glucose  15-30 g Oral Q15 Min PRN    Or    dextrose  25-50 mL Intravenous Q15 Min PRN    Or    glucagon  1 mg Subcutaneous Q15 Min PRN    fluPHENAZine  1 mg Oral BID PRN    hydrALAZINE  10 mg Oral 4x Daily PRN    melatonin  3 mg Oral At Bedtime PRN    nicotine polacrilex  4 mg Buccal Q1H PRN    OLANZapine  10 mg Oral TID PRN    Or    OLANZapine  10 mg Intramuscular TID PRN    sulindac  200 mg Oral BID PRN       Labs and Imaging:  New results:   Recent Results (from the past 24 hour(s))   Glucose by meter    Collection Time: 11/09/23  6:03 PM   Result Value Ref Range    GLUCOSE BY METER POCT 104 (H) 70 - 99 mg/dL   Glucose by meter    Collection Time: 11/09/23 10:44 PM   Result Value Ref Range    GLUCOSE BY METER POCT 136 (H) 70 - 99 mg/dL   Glucose by meter    Collection Time: 11/10/23  6:31 AM   Result Value Ref Range    GLUCOSE BY METER POCT 121 (H) 70 - 99 mg/dL   Lithium level    Collection Time: 11/10/23  8:10 AM   Result Value Ref Range    Lithium 0.68 0.60 - 1.20 mmol/L   Glucose by meter    Collection Time: 11/10/23 12:05 PM   Result Value Ref Range    GLUCOSE BY METER POCT 119 (H) 70 - 99 mg/dL      Component       "Latest Ref Rng 8/22/2023  7:09 AM 8/28/2023  8:58 AM 9/26/2023  12:47 PM 11/7/2023  9:56 AM 11/10/2023  8:10 AM   Lithium Level      0.60 - 1.20 mmol/L 0.50 (L)  0.40 (L)  0.21 (L)  0.42 (L)  0.68        Component      Latest Ref Rng 10/27/2023  10:44 AM 10/29/2023  11:38 PM   Amphetamine Qual Urine      Screen Negative  Screen Negative  Screen Negative    Barbiturates Qual Urine      Screen Negative  Screen Negative  Screen Negative    Cannabinoids Qual Urine      Screen Negative  Screen Positive !  Screen Positive !    Opiates Qualitative Urine      Screen Negative  Screen Negative  Screen Negative    Benzodiazepine Urine      Screen Negative  Screen Negative  Screen Positive !    Cocaine Urine      Screen Negative  Screen Negative  Screen Negative    Fentanyl Qual Urine      Screen Negative  Screen Negative  Screen Negative    PCP Urine      Screen Negative  Screen Negative  Screen Negative        Data this admission:  - Lithium low on 11/7 collection, rechecked 11/10 and is in NL range 0.68  - CBC unremarkable, pending recheck  - CMP unremarkable  - TSH normal  - Lipids: elevated triglycerides, low HDL  - Vit B12 normal  - Folate normal  - UDS from 11/7 not collected  - HCG  not collected  - UA  not collected       Mental Status Exam:     Oriented to:  Grossly Oriented, Person/Self, Situation, and Unit/Floor  General:  Awake and Alert  Appearance:  Grooming is adequate, Dressed in glasses and T shirt/home clothes, and Hair is short. Tattoos on forearms visible.  Behavior/Attitude:  Cooperative and Easy to redirect  Eye Contact: Appropriate  Psychomotor: Restless, leg shaking no catatonia present, somewhat improved from yesterday  Speech:  appropriate volume/tone  Language: Fluent in English with appropriate syntax and vocabulary.  Mood:  \"more activated\"  Affect:   noticeably more animated with wider range of emotiveness  Thought Process:  linear and coherent  Thought Content:    No SI/SIB/VH, but ongoing AH ; " No apparent delusions  Associations:  intact  Insight:  fair due to recognizing AH are outside of himself although he endorses struggling with this from time to time  Judgment:  fair due to shared history and in-hospital adherence to medication, but some collateral information from outpatient psychiatrist inconsistent. Tries to not act on AH.   Impulse control: fair, voices concern with command AH  Attention Span:  adequate for conversation  Concentration:  grossly intact and limited per patient expressed experience  Recent and Remote Memory:  grossly intact  Fund of Knowledge: average  Muscle Strength and Tone:  not assessed  Gait and Station:  normal but constant pacing back-n-forth and side-to-side     Psychiatric Assessment     Prakash Prasad is a 33 year old adult previously diagnosed with schizoaffective disorder bipolar type, BPD, AVA, PTSD, ADHD, gender dysphoria, and polysubstance use disorder (opioids, THC, amphetamine, MDMA, nicotine) who presented voluntarily with increasing paranoia, AH, and manic behavior in the context of medication non-adherence and Ativan overdose. Most recent psychiatric hospitalization was at [facility] from 8/8-8/28/23. Significant symptoms on admission included subjective hypomania and anxiety. The MSE on admission was pertinent for absence of overt haven, flat affect, and auditory hallucinations. Biological contributions to presentation include prior diagnoses of schizoaffective disorder, AVA, PTSD, and ADHD. Psychological contributions to presentation include prior diagnosis of BPD. Social factors contributing to presentation include stressful environment in his group home, which provides limited support for medication management. Protective factors include being engaged in treatment.     In summary, the patient's reported previous symptoms of manic behavior, paranoia, auditory hallucinations in the context of medication non-adherence and Ativan overdose are consistent with  schizoaffective disorder, bipolar type. He will likely benefit from medication management this admission.     Given that he currently has resolving haven and auditory hallucinations, patient warrants inpatient psychiatric hospitalization to maintain his safety. In addition, patient is under civil commitment through [facility] and his provisional discharge was revoked on 10/31/23 for med noncompliance and increasing psychosis symptoms.     Psychiatric Plan by Diagnosis      Today's changes:  - colace, 100mg BID  - IOP consult placed  - Planned complete cbc for Saturday 11/11 for baseline before prolixin increase  - Plan to increase prolixin Sunday 11/12 to 5 mg at bedtime  - EKG Sunday 11/12 to assess Qtc interval    Prolixin dose titration (half-life of ~3 days):  - Sunday, 11/12 (Day4): increase to 5mg PO, with EKG and symptom check  - Wednesday, 11/15 (Day7): increase to 10mg PO - continue for 2 weeks  - Friday, 11/17 (Day9): EKG to assess Qtc interval  - Wednesday, 11/29 (Day21): switch to 12.5mg IM DOMINGUEZ Prolixin and 5mg PO - continue for 2 weeks, recheck CBC for neutrophil changes.   - Wedesday, 12/13 (Day35): continue 12.5mg IM DOMINGUEZ Prolixin and discontinue PO    Klonopin taper:  - Monday 11/13, reduce total dose by 0.5mg (from 1mg BID to 0.75mg BID)    # Schizoaffective disorder, bipolar type  #Anxiety  #BPD  1. Medications:  - lithium ER, 900mg - plan to discuss Li changes on Monday as pt has been on 1200 in past and tolerated it well   - clonazepam 1 mg BID PRN  - olanzapine, 10 mg, AM  - olanzapine, 5 mg, bedtime  - quetiapine, 200mg, bedtime  - prolixin oral, daily at bedtime, 2.5mg through Saturday 11/11 - Plan to increase to 5 mg on Sunday 11/12  - prolixin oral, 1mg BID PRN (1st line for anxiety/agitation)  - Propranolol, 10mg, TID     2. Pertinent Labs/Monitoring:   - lithium levels, last checked 11/7 was low at that time, 0.68 on 11/10  - UDS not collected  - EKG, 11/7   - Recheck EKG Sunday  11/12  - Planned complete cbc for Saturday 11/11 for baseline neutrophils before prolixin increase     3. Additional Plans:  - Patient will be treated in therapeutic milieu with appropriate individual and group therapies as described  - Consider lamotrigine for dual benefit of mood stabilization and treatment of nerve pain  - IOP consult placed 11/10     Psychiatric Hospital Course:      Prakash Prasad was admitted to [facility] as a voluntary patient.   Medications:  PTA lithium, clonazepam, olanzapine, quetiapine, amlodipine, gabapentin, insulin glargine, empagliflozin, pantoprazole, rosuvastatin, testosterone & tretinoin were continued from ED.    New medications started at the time of admission include sulindac PRN for hand pain.   11/9: started on prolixin (2.5mg daily with 1mg BID PRN) with reductions in Seroquel (200mg) and zyPREXA (15mg total) doses. Propranolol added 10mg TID. Biotene requested and ordered.  Lithium levels low on admission, 11/10 = 0.68    The risks, benefits, alternatives, and side effects were discussed and understood by the patient.     Medical Assessment and Plan     Medical diagnoses to be addressed this admission:    # ongoing right hand pain  - x-ray ordered, 11/8 --> no fracture  - On sulindac, 200mg BID PRN (NSAID approved for use with lithium)  - internal medicine consulted, 11/8  - consult from ortho, recommendation for outpatient f/u with referral to hand surgeon if symptoms persist    #Nausea/vomiting on 11/9 - Last EKG wnl, vitals stable, BS was 160's at the time of N/V. Pt denied HA, CP, SOB. Pt believes this episode was due to anxiety.   - Prn zofran.     #Constipation  - Colace BID scheduled with Murelax     Medical course: Patient was physically examined by the ED prior to being transferred to the unit and was found to be medically stable and appropriate for admission. Internal medicine has been consulted for management of right hand pain after patient punched window  last night 11/7, same hand injured in ED after pt punched a wall, XR in ED was negative for fracture.     Consults:   - internal medicine for treatment of right hand pain after punching window  - ortho consult as well for hand evaluation, could provide outpt follow up if pain persists.        Checklist     Legal Status: Committed     Safety Assessment:   Behavioral Orders   Procedures    Assault precautions    Code 1 - Restrict to Unit    Routine Programming     As clinically indicated    Status 15     Every 15 minutes.    Status Individual Observation     Patient SIO status reviewed with team/RN.  Please also refer to RN/team documentation for add'l detail.    Patient on 1:1. Must have door open due to suicide risk.     -SIO staff to monitor following which have contributed to patient being on SIO:  SI, manic/impulse-control concerns, self-injurious behavior (repeated wall punching)  -Possible interventions SIO staff could use to support patient's treatment progress:  Supportive care  -When following observed, team will review discontinuation of SIO:  No self-injurious behaviors, no escalation     Order Specific Question:   CONTINUOUS 24 hours / day     Answer:   Other     Order Specific Question:   Specify distance     Answer:   10 feet     Order Specific Question:   Indications for SIO     Answer:   Suicide risk    Suicide precautions     Patients on Suicide Precautions should have a Combination Diet ordered that includes a Diet selection(s) AND a Behavioral Tray selection for Safe Tray - with utensils, or Safe Tray - NO utensils         Risk Assessment:  Risk for harm is moderate.  Risk factors: impulsive  Protective factors: engaged in treatment     SIO: Yes    Disposition: Pending stabilization, medication optimization, & development of a safe discharge plan.     Attestations     Resident with med student: Chicho Ramos, MS3  [facility] Medical Student     I was present with the medical student who participated in the  service and in the documentation of the note.  I have verified the history and personally performed the physical exam and medical decision making. I agree with the assessment and plan of care as documented in the note.    Sunni Zelaya MD  Psychiatry Resident Physician

## 2023-11-10 NOTE — PROGRESS NOTES
"   11/10/2023    Group Therapy Session   Group Attendance attended group session   Time Session Began 1015   Time Session Ended 1100   Total Time (minutes) 30   Total # Attendees 4   Group Type psychotherapeutic   Group Topic Covered Group Topic Covered: anger/conflict management, coping skills/lifestyle management   Group Session Detail Identify anger triggers, process accompanying emotions, and develop coping strategies for anger    Patient Response/Contribution cooperative with task, expressed readiness to alter behaviors   Patient Participation Detail Pt states he feels \"pretty elevated.\" Pt reports that he \"spazzed out\" in the ED because he was scared and anxious. Voices in his head and lack of impulse control results in losing control. Pt reports he is like a toddler in that when he does not get what he wants he loses control and spazzes out, reports wanting to work on that.      "
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
## 2023-11-10 NOTE — PLAN OF CARE
"  Problem: Adult Inpatient Plan of Care  Goal: Optimal Comfort and Wellbeing  Intervention: Monitor Pain and Promote Comfort  Recent Flowsheet Documentation  Taken 11/10/2023 0334 by Dashawn Salvador RN  Pain Management Interventions: medication (see MAR)     Problem: Pain Acute  Goal: Optimal Pain Control and Function  Intervention: Develop Pain Management Plan  Recent Flowsheet Documentation  Taken 11/10/2023 0334 by Dashawn Salvador RN  Pain Management Interventions: medication (see MAR)     Problem: Sleep Disturbance  Goal: Adequate Sleep/Rest  Outcome: Not Progressing   Patient alert and oriented x 4. Patient complained of headache-medicated with Tylenol, effective. Patient complains of constipation stated no BM for 4 days, passing gas. Encouraged patient to drink plenty of fluids-iced water provided. Patient reported Colace works for him in the past- left sticky note to MD. Patient VS, Blood pressure 123/83, pulse 85, temperature 97.6  F (36.4  C), temperature source Oral, resp. rate 20, height 1.702 m (5' 7\"), weight 105.2 kg (231 lb 14.4 oz), SpO2 94% on RA. Patient woke up early this morning, reading in the dining area. Patient paced the hallway. Patient continues on SIO (1:1). Patient appears calm,no behavioral issues so far this shift. Will continue to monitor patient, notify MD with any concerns. Will continue with current plan of care.  "

## 2023-11-11 LAB
BASOPHILS # BLD AUTO: 0.1 10E3/UL (ref 0–0.2)
BASOPHILS NFR BLD AUTO: 1 %
EOSINOPHIL # BLD AUTO: 0.2 10E3/UL (ref 0–0.7)
EOSINOPHIL NFR BLD AUTO: 2 %
ERYTHROCYTE [DISTWIDTH] IN BLOOD BY AUTOMATED COUNT: 15 % (ref 10–15)
GLUCOSE BLDC GLUCOMTR-MCNC: 115 MG/DL (ref 70–99)
GLUCOSE BLDC GLUCOMTR-MCNC: 151 MG/DL (ref 70–99)
GLUCOSE BLDC GLUCOMTR-MCNC: 163 MG/DL (ref 70–99)
HCG SERPL QL: NEGATIVE
HCT VFR BLD AUTO: 44 % (ref 35–53)
HGB BLD-MCNC: 14.5 G/DL (ref 11.7–17.7)
HOLD SPECIMEN: NORMAL
IMM GRANULOCYTES # BLD: 0 10E3/UL
IMM GRANULOCYTES NFR BLD: 0 %
LYMPHOCYTES # BLD AUTO: 2.3 10E3/UL (ref 0.8–5.3)
LYMPHOCYTES NFR BLD AUTO: 25 %
MCH RBC QN AUTO: 28.6 PG (ref 26.5–33)
MCHC RBC AUTO-ENTMCNC: 33 G/DL (ref 31.5–36.5)
MCV RBC AUTO: 87 FL (ref 78–100)
MONOCYTES # BLD AUTO: 0.6 10E3/UL (ref 0–1.3)
MONOCYTES NFR BLD AUTO: 7 %
NEUTROPHILS # BLD AUTO: 6.2 10E3/UL (ref 1.6–8.3)
NEUTROPHILS NFR BLD AUTO: 65 %
NRBC # BLD AUTO: 0 10E3/UL
NRBC BLD AUTO-RTO: 0 /100
PLATELET # BLD AUTO: 336 10E3/UL (ref 150–450)
RBC # BLD AUTO: 5.07 10E6/UL (ref 3.8–5.9)
WBC # BLD AUTO: 9.4 10E3/UL (ref 4–11)

## 2023-11-11 PROCEDURE — G0177 OPPS/PHP; TRAIN & EDUC SERV: HCPCS

## 2023-11-11 PROCEDURE — 250N000013 HC RX MED GY IP 250 OP 250 PS 637: Performed by: STUDENT IN AN ORGANIZED HEALTH CARE EDUCATION/TRAINING PROGRAM

## 2023-11-11 PROCEDURE — 250N000013 HC RX MED GY IP 250 OP 250 PS 637

## 2023-11-11 PROCEDURE — 250N000013 HC RX MED GY IP 250 OP 250 PS 637: Performed by: PHYSICIAN ASSISTANT

## 2023-11-11 PROCEDURE — 124N000002 HC R&B MH UMMC

## 2023-11-11 PROCEDURE — A9270 NON-COVERED ITEM OR SERVICE: HCPCS

## 2023-11-11 PROCEDURE — 85025 COMPLETE CBC W/AUTO DIFF WBC: CPT

## 2023-11-11 PROCEDURE — 36415 COLL VENOUS BLD VENIPUNCTURE: CPT

## 2023-11-11 PROCEDURE — 84703 CHORIONIC GONADOTROPIN ASSAY: CPT

## 2023-11-11 RX ADMIN — NICOTINE 1 PATCH: 21 PATCH, EXTENDED RELEASE TRANSDERMAL at 08:16

## 2023-11-11 RX ADMIN — DOCUSATE SODIUM 100 MG: 100 CAPSULE, LIQUID FILLED ORAL at 19:27

## 2023-11-11 RX ADMIN — EMPAGLIFLOZIN 10 MG: 10 TABLET, FILM COATED ORAL at 08:12

## 2023-11-11 RX ADMIN — GABAPENTIN 1200 MG: 600 TABLET, FILM COATED ORAL at 08:11

## 2023-11-11 RX ADMIN — NICOTINE POLACRILEX 4 MG: 4 GUM, CHEWING BUCCAL at 19:24

## 2023-11-11 RX ADMIN — OLANZAPINE 5 MG: 5 TABLET, FILM COATED ORAL at 21:08

## 2023-11-11 RX ADMIN — PROPRANOLOL HYDROCHLORIDE 10 MG: 10 TABLET ORAL at 08:11

## 2023-11-11 RX ADMIN — NICOTINE POLACRILEX 4 MG: 4 GUM, CHEWING BUCCAL at 16:07

## 2023-11-11 RX ADMIN — POLYETHYLENE GLYCOL 3350 17 G: 17 POWDER, FOR SOLUTION ORAL at 08:12

## 2023-11-11 RX ADMIN — GABAPENTIN 1200 MG: 600 TABLET, FILM COATED ORAL at 19:24

## 2023-11-11 RX ADMIN — CLONAZEPAM 1 MG: 1 TABLET ORAL at 19:24

## 2023-11-11 RX ADMIN — FLUPHENAZINE HYDROCHLORIDE 2.5 MG: 2.5 TABLET, FILM COATED ORAL at 21:08

## 2023-11-11 RX ADMIN — FLUPHENAZINE HYDROCHLORIDE 1 MG: 1 TABLET, FILM COATED ORAL at 12:32

## 2023-11-11 RX ADMIN — NICOTINE POLACRILEX 4 MG: 4 GUM, CHEWING BUCCAL at 21:09

## 2023-11-11 RX ADMIN — INSULIN GLARGINE 25 UNITS: 100 INJECTION, SOLUTION SUBCUTANEOUS at 08:09

## 2023-11-11 RX ADMIN — ROSUVASTATIN 40 MG: 20 TABLET, FILM COATED ORAL at 08:12

## 2023-11-11 RX ADMIN — CLONAZEPAM 1 MG: 1 TABLET ORAL at 08:11

## 2023-11-11 RX ADMIN — GABAPENTIN 1200 MG: 600 TABLET, FILM COATED ORAL at 13:01

## 2023-11-11 RX ADMIN — OLANZAPINE 10 MG: 10 TABLET, FILM COATED ORAL at 08:11

## 2023-11-11 RX ADMIN — AMLODIPINE BESYLATE 10 MG: 5 TABLET ORAL at 08:11

## 2023-11-11 RX ADMIN — LITHIUM CARBONATE 900 MG: 450 TABLET, EXTENDED RELEASE ORAL at 21:04

## 2023-11-11 RX ADMIN — NICOTINE POLACRILEX 4 MG: 4 GUM, CHEWING BUCCAL at 12:47

## 2023-11-11 RX ADMIN — DOCUSATE SODIUM 100 MG: 100 CAPSULE, LIQUID FILLED ORAL at 08:11

## 2023-11-11 RX ADMIN — PANTOPRAZOLE SODIUM 40 MG: 40 TABLET, DELAYED RELEASE ORAL at 08:11

## 2023-11-11 RX ADMIN — NICOTINE POLACRILEX 4 MG: 4 GUM, CHEWING BUCCAL at 14:44

## 2023-11-11 RX ADMIN — TESTOSTERONE 50 MG OF TESTOSTERONE: 50 GEL TRANSDERMAL at 08:12

## 2023-11-11 RX ADMIN — DOXYCYCLINE HYCLATE 100 MG: 100 CAPSULE ORAL at 08:11

## 2023-11-11 RX ADMIN — DOXYCYCLINE HYCLATE 100 MG: 100 CAPSULE ORAL at 19:23

## 2023-11-11 RX ADMIN — NICOTINE POLACRILEX 4 MG: 4 GUM, CHEWING BUCCAL at 17:51

## 2023-11-11 RX ADMIN — TRETINOIN: 0.5 CREAM TOPICAL at 21:09

## 2023-11-11 RX ADMIN — PROPRANOLOL HYDROCHLORIDE 10 MG: 10 TABLET ORAL at 13:01

## 2023-11-11 RX ADMIN — NICOTINE POLACRILEX 4 MG: 4 GUM, CHEWING BUCCAL at 08:16

## 2023-11-11 RX ADMIN — PROPRANOLOL HYDROCHLORIDE 10 MG: 10 TABLET ORAL at 19:23

## 2023-11-11 RX ADMIN — QUETIAPINE FUMARATE 200 MG: 200 TABLET ORAL at 21:04

## 2023-11-11 RX ADMIN — NICOTINE POLACRILEX 4 MG: 4 GUM, CHEWING BUCCAL at 10:58

## 2023-11-11 ASSESSMENT — ACTIVITIES OF DAILY LIVING (ADL)
ADLS_ACUITY_SCORE: 42
DRESS: INDEPENDENT
ADLS_ACUITY_SCORE: 42
LAUNDRY: WITH SUPERVISION
ADLS_ACUITY_SCORE: 42
DRESS: STREET CLOTHES
ORAL_HYGIENE: INDEPENDENT
HYGIENE/GROOMING: INDEPENDENT
ADLS_ACUITY_SCORE: 42
HYGIENE/GROOMING: INDEPENDENT
ORAL_HYGIENE: INDEPENDENT
LAUNDRY: WITH SUPERVISION

## 2023-11-11 NOTE — PLAN OF CARE
Problem: Sleep Disturbance  Goal: Adequate Sleep/Rest  Outcome: Progressing   Patient sleeping most of the shift. Woke up @ 0530 early this morning. Patient had 6 total hours of sleep. No behavioral issues or safety concerns at this time. Safety checks q 15 minutes. No complains of pain and discomfort so far this shift. Will continue to  monitor the patient and provide therapeutic intervention as needed. Will continue with current plan of care. Notify MD with any concerns.

## 2023-11-11 NOTE — PLAN OF CARE
Goal Outcome Evaluation:    Plan of Care Reviewed With: patient      Problem: Psychotic Signs/Symptoms  Goal: Improved Behavioral Control (Psychotic Signs/Symptoms)  Outcome: Progressing       Patient alert and oriented x 4. He's on SIO and BS checks. He appeared well-kempt. He was pleasant on approach. He endorsed mild anxiety and depression. He also reported auditory hallucinations: voices telling him to harm others. He, however, contracted for safety, said he will not act on these voices and will inform the writer when they intensify. He's eating and drinking adequately. He's visible in the milieu, social with staff but not with other peers. He spent most of his time drawing/coloring in the dining area. He's medication and care compliant.     At lunch time, patient c/o voices getting more aggressive (telling him to hurt other people), making him more anxious and agitated. PRN prolixin given. Warm packs also provided. He also listened to music through headphones. At around 1 PM, he said that he feels like acting on the voices; patient then asked he can take his 2 PM meds at that time. Scheduled meds given. Patient took a nap after.     PRN nicotine given three times this shift.

## 2023-11-11 NOTE — PLAN OF CARE
Problem: Adult Inpatient Plan of Care  Goal: Plan of Care Review  Description: The Plan of Care Review/Shift note should be completed every shift.  The Outcome Evaluation is a brief statement about your assessment that the patient is improving, declining, or no change.  This information will be displayed automatically on your shift  note.  Outcome: Progressing     Problem: Anxiety  Goal: Anxiety Reduction or Resolution  Outcome: Progressing     Problem: Psychotic Signs/Symptoms  Goal: Improved Behavioral Control (Psychotic Signs/Symptoms)  Outcome: Progressing   Goal Outcome Evaluation:    Plan of Care Reviewed With: patient      Pt continues to be SIO 1:1 10 ft for suicidal injury, alert and oriented and able to make needs known. Pt seeks staff several times to print stuff, look up his medication and to know what time he gets them. Pt endorsed anxiety and depression minimal was printed some coping mechanism and was able to go without any PRNs. Pt was doing some coloring and journaling with the SIO staff, endorsed A/hallucination that are commanding him to hurt someone but pt was able to contract for safety in the unit. Pt denied all other mental health issues. Pt attended groups, hygiene is appropriate, nutrition and intake is excellent. No outburst behavior or safety concerns noted.  BS before dinner 151    BP (!) 152/90   Pulse 87   Temp 98  F   Resp 17   SpO2 96%                   No

## 2023-11-11 NOTE — PLAN OF CARE
Problem: Adult Inpatient Plan of Care  Goal: Plan of Care Review  Description: The Plan of Care Review/Shift note should be completed every shift.  The Outcome Evaluation is a brief statement about your assessment that the patient is improving, declining, or no change.  This information will be displayed automatically on your shift  note.  Outcome: Progressing     Problem: Anxiety  Goal: Anxiety Reduction or Resolution  Outcome: Progressing     Problem: Psychotic Signs/Symptoms  Goal: Improved Behavioral Control (Psychotic Signs/Symptoms)  Outcome: Progressing     Problem: Pain Acute  Goal: Optimal Pain Control and Function  Outcome: Progressing   Goal Outcome Evaluation:    Plan of Care Reviewed With: patient      Pt stated he wanted to see the dietitian who was already in the unit, dietitian was able to see the pt, the pt requested for double portion and order was put in place. Pt verbalized he was happy about it, alert and oriented and able to make needs known. Pt visible in the milieu, paced the halls back to back, endorsed anxiety/depression minimal, denied intervention, endorsed A/Hallucination that is telling him to hurt someone but contracted for safety in the unit, SIO in place was able to distract the pt by coloring and journaling which made the pt to be busy. Pt denied SI, SIB, HI, and V/Hallucination, denied all other psyche issues Pt was in the nursing station couple times requesting for nicorette and also requested a copy to be printed for coping mechanism and stated it was helpful. Pt is medication compliant, hygiene is appropriate, no safety concerns or outburst behavior noted.    /81   Pulse 87   Temp 97.8  F   Resp 18  SpO2 94%

## 2023-11-11 NOTE — PROGRESS NOTES
"Collateral from pt's outpt mental health provider Regine Last    11/8/2023    Provider shared that at their last meeting, Prakash had shared that he had been off all his meds except for 400 mg of Seroquel which he felt was helping. He had also been taking Lithium before this but had not been consistent about coming in for labs, had been \"fired\" from a previous therapist due to not showing up for appointments. Provider shared that Prakash has been known to use and take other resident's medications at his group home. CM and provider both feel that the group home is not a good fit due to this and that they did not remove or get control of his meds after he broke into group home's med room and took all his meds back to his room recently. Pt has denied this. Pt has been known to be verbally threatening to  staff, endorsed paranoia that they are following him around. Back in early Oct when seen in clinic, Prakash had endorsed high anxiety/agitation and requested benzos. He had just been tapered off of Klonopin and was asking for Ativan. He was restarted on Klonopin for anxiety next day and was asked to follow up in clinic but did not. It has been CM's opinion that pt has not been doing well, he was hospitalized in August as well for MH, but CM is new to the pt per Regine. Shared that CM has said pt shared with her that he has been taking meth since August. Regine noted that  has shared pt has been in park with others they do not know and went to ED Oct 29th with manic behavior. Outpt provider would recommend Prakash attempt outpt DBT but that he may not due well in a group setting. [facility] treatment center would also be helpful but he has declined this in the past. Would also like to see pt set up with [facility] if possible for CM, therapy, and care in one place. An ACT team would be helpful. Overall, Prakash has not been doing well medically either with unstable BS. Provider has recommended pt stabilize more before top " surgery. Pt used to be on Prolixin, did well but stopped coming in for IM. Was also more stable on higher doses of lithium in the past. Pt has not taken Lamotrigine, and provider concerned about this med due to pt historically not consistent with taking medications. Also has a hx of drinking and having elevated LFTs so wouldn't recommend Depakote.      Sunni Zelaya MD  Psychiatry Resident Physician    This phone call was discussed with my, Dr. Jeniffer Wade.  Please see progress note dated 11/8/2023 for daily attestation statement.

## 2023-11-11 NOTE — PROGRESS NOTES
"Psychiatry Cross Cover Note    Subjective:   Per handoff, checked-in with Prakash to determine whether he was noticing any side-effects or had any concerns regarding prolixin since this was started. He reported that he feels okay currently despite still hearing some voices. Denies any side-effects or discomfort since starting prolixin. Since he is tolerating it well, would like to continue with titration plan as previously discussed with primary team.     Objective:   /86 (BP Location: Right arm, Patient Position: Sitting, Cuff Size: Adult Regular)   Pulse 92   Temp 98  F (36.7  C) (Oral)   Resp 17   Ht 1.702 m (5' 7\")   Wt 105.2 kg (231 lb 14.4 oz)   SpO2 96%   BMI 36.32 kg/m      Assessment/Plan:  - continue with primary team's medication plan of up-titration of prolixin, scheduled to start tomorrow, 11/12    María Elena Amanda MD  PGY-2 Psychiatry Resident    "

## 2023-11-12 LAB
ALBUMIN UR-MCNC: NEGATIVE MG/DL
AMPHETAMINES UR QL SCN: ABNORMAL
APPEARANCE UR: ABNORMAL
BACTERIA #/AREA URNS HPF: ABNORMAL /HPF
BARBITURATES UR QL SCN: ABNORMAL
BENZODIAZ UR QL SCN: ABNORMAL
BILIRUB UR QL STRIP: NEGATIVE
BZE UR QL SCN: ABNORMAL
CANNABINOIDS UR QL SCN: ABNORMAL
COLOR UR AUTO: ABNORMAL
FENTANYL UR QL: ABNORMAL
GLUCOSE BLDC GLUCOMTR-MCNC: 146 MG/DL (ref 70–99)
GLUCOSE BLDC GLUCOMTR-MCNC: 159 MG/DL (ref 70–99)
GLUCOSE BLDC GLUCOMTR-MCNC: 99 MG/DL (ref 70–99)
GLUCOSE UR STRIP-MCNC: >=1000 MG/DL
HGB UR QL STRIP: ABNORMAL
KETONES UR STRIP-MCNC: NEGATIVE MG/DL
LEUKOCYTE ESTERASE UR QL STRIP: ABNORMAL
MUCOUS THREADS #/AREA URNS LPF: PRESENT /LPF
NITRATE UR QL: NEGATIVE
OPIATES UR QL SCN: ABNORMAL
PCP QUAL URINE (ROCHE): ABNORMAL
PH UR STRIP: 7 [PH] (ref 5–7)
RBC URINE: 2 /HPF
SP GR UR STRIP: 1.01 (ref 1–1.03)
SQUAMOUS EPITHELIAL: 16 /HPF
UROBILINOGEN UR STRIP-MCNC: NORMAL MG/DL
WBC URINE: 6 /HPF

## 2023-11-12 PROCEDURE — 250N000013 HC RX MED GY IP 250 OP 250 PS 637

## 2023-11-12 PROCEDURE — 93005 ELECTROCARDIOGRAM TRACING: CPT

## 2023-11-12 PROCEDURE — 93010 ELECTROCARDIOGRAM REPORT: CPT | Performed by: INTERNAL MEDICINE

## 2023-11-12 PROCEDURE — 81001 URINALYSIS AUTO W/SCOPE: CPT

## 2023-11-12 PROCEDURE — 124N000002 HC R&B MH UMMC

## 2023-11-12 PROCEDURE — 80307 DRUG TEST PRSMV CHEM ANLYZR: CPT

## 2023-11-12 PROCEDURE — A9270 NON-COVERED ITEM OR SERVICE: HCPCS

## 2023-11-12 PROCEDURE — 250N000013 HC RX MED GY IP 250 OP 250 PS 637: Performed by: PHYSICIAN ASSISTANT

## 2023-11-12 PROCEDURE — 87086 URINE CULTURE/COLONY COUNT: CPT | Performed by: PHYSICIAN ASSISTANT

## 2023-11-12 PROCEDURE — 250N000013 HC RX MED GY IP 250 OP 250 PS 637: Performed by: STUDENT IN AN ORGANIZED HEALTH CARE EDUCATION/TRAINING PROGRAM

## 2023-11-12 RX ADMIN — NICOTINE POLACRILEX 4 MG: 4 GUM, CHEWING BUCCAL at 13:02

## 2023-11-12 RX ADMIN — CLONAZEPAM 1 MG: 1 TABLET ORAL at 19:30

## 2023-11-12 RX ADMIN — PANTOPRAZOLE SODIUM 40 MG: 40 TABLET, DELAYED RELEASE ORAL at 05:40

## 2023-11-12 RX ADMIN — OLANZAPINE 10 MG: 10 TABLET, FILM COATED ORAL at 18:09

## 2023-11-12 RX ADMIN — OLANZAPINE 10 MG: 10 TABLET, FILM COATED ORAL at 08:00

## 2023-11-12 RX ADMIN — NICOTINE POLACRILEX 4 MG: 4 GUM, CHEWING BUCCAL at 19:36

## 2023-11-12 RX ADMIN — DOXYCYCLINE HYCLATE 100 MG: 100 CAPSULE ORAL at 08:00

## 2023-11-12 RX ADMIN — OLANZAPINE 5 MG: 5 TABLET, FILM COATED ORAL at 22:20

## 2023-11-12 RX ADMIN — EMPAGLIFLOZIN 10 MG: 10 TABLET, FILM COATED ORAL at 08:00

## 2023-11-12 RX ADMIN — NICOTINE POLACRILEX 4 MG: 4 GUM, CHEWING BUCCAL at 10:27

## 2023-11-12 RX ADMIN — INSULIN GLARGINE 25 UNITS: 100 INJECTION, SOLUTION SUBCUTANEOUS at 08:02

## 2023-11-12 RX ADMIN — TESTOSTERONE 50 MG OF TESTOSTERONE: 50 GEL TRANSDERMAL at 08:00

## 2023-11-12 RX ADMIN — PROPRANOLOL HYDROCHLORIDE 10 MG: 10 TABLET ORAL at 19:30

## 2023-11-12 RX ADMIN — ACETAMINOPHEN 650 MG: 325 TABLET, FILM COATED ORAL at 05:34

## 2023-11-12 RX ADMIN — NICOTINE POLACRILEX 4 MG: 4 GUM, CHEWING BUCCAL at 06:16

## 2023-11-12 RX ADMIN — POLYETHYLENE GLYCOL 3350 17 G: 17 POWDER, FOR SOLUTION ORAL at 08:01

## 2023-11-12 RX ADMIN — PROPRANOLOL HYDROCHLORIDE 10 MG: 10 TABLET ORAL at 08:01

## 2023-11-12 RX ADMIN — GABAPENTIN 1200 MG: 600 TABLET, FILM COATED ORAL at 07:59

## 2023-11-12 RX ADMIN — DOXYCYCLINE HYCLATE 100 MG: 100 CAPSULE ORAL at 19:30

## 2023-11-12 RX ADMIN — GABAPENTIN 1200 MG: 600 TABLET, FILM COATED ORAL at 19:30

## 2023-11-12 RX ADMIN — FLUPHENAZINE HYDROCHLORIDE 1 MG: 1 TABLET, FILM COATED ORAL at 13:20

## 2023-11-12 RX ADMIN — ACETAMINOPHEN 650 MG: 325 TABLET, FILM COATED ORAL at 16:17

## 2023-11-12 RX ADMIN — NICOTINE POLACRILEX 4 MG: 4 GUM, CHEWING BUCCAL at 16:17

## 2023-11-12 RX ADMIN — FLUPHENAZINE HYDROCHLORIDE 5 MG: 2.5 TABLET, FILM COATED ORAL at 22:21

## 2023-11-12 RX ADMIN — PROPRANOLOL HYDROCHLORIDE 10 MG: 10 TABLET ORAL at 13:02

## 2023-11-12 RX ADMIN — QUETIAPINE FUMARATE 200 MG: 200 TABLET ORAL at 22:20

## 2023-11-12 RX ADMIN — GABAPENTIN 1200 MG: 600 TABLET, FILM COATED ORAL at 13:02

## 2023-11-12 RX ADMIN — AMLODIPINE BESYLATE 10 MG: 5 TABLET ORAL at 08:00

## 2023-11-12 RX ADMIN — ROSUVASTATIN 40 MG: 20 TABLET, FILM COATED ORAL at 08:00

## 2023-11-12 RX ADMIN — DOCUSATE SODIUM 100 MG: 100 CAPSULE, LIQUID FILLED ORAL at 08:00

## 2023-11-12 RX ADMIN — CLONAZEPAM 1 MG: 1 TABLET ORAL at 08:00

## 2023-11-12 RX ADMIN — DOCUSATE SODIUM 100 MG: 100 CAPSULE, LIQUID FILLED ORAL at 19:30

## 2023-11-12 RX ADMIN — NICOTINE 1 PATCH: 21 PATCH, EXTENDED RELEASE TRANSDERMAL at 08:03

## 2023-11-12 RX ADMIN — ACETAMINOPHEN 650 MG: 325 TABLET, FILM COATED ORAL at 20:00

## 2023-11-12 RX ADMIN — NICOTINE POLACRILEX 4 MG: 4 GUM, CHEWING BUCCAL at 08:00

## 2023-11-12 RX ADMIN — LITHIUM CARBONATE 900 MG: 450 TABLET, EXTENDED RELEASE ORAL at 22:20

## 2023-11-12 RX ADMIN — NICOTINE POLACRILEX 4 MG: 4 GUM, CHEWING BUCCAL at 18:04

## 2023-11-12 ASSESSMENT — ACTIVITIES OF DAILY LIVING (ADL)
ADLS_ACUITY_SCORE: 42
LAUNDRY: WITH SUPERVISION
HYGIENE/GROOMING: INDEPENDENT
ADLS_ACUITY_SCORE: 42
ADLS_ACUITY_SCORE: 42
DRESS: INDEPENDENT;STREET CLOTHES
ORAL_HYGIENE: INDEPENDENT
ORAL_HYGIENE: INDEPENDENT
LAUNDRY: WITH SUPERVISION
ADLS_ACUITY_SCORE: 42
HYGIENE/GROOMING: INDEPENDENT
ADLS_ACUITY_SCORE: 42
DRESS: INDEPENDENT

## 2023-11-12 NOTE — PROGRESS NOTES
"Psychiatry Cross Cover Note    Subjective:   Notified by staff that patient had reported difficulty with urination.  Nurse also reported that patient reported that he has history of neurogenic bladder.  Patient is not reporting any abdominal pain or any other issues.    Objective:   /84 (BP Location: Right arm, Patient Position: Sitting, Cuff Size: Adult Regular)   Pulse 92   Temp 97.8  F (36.6  C) (Oral)   Resp 16   Ht 1.702 m (5' 7\")   Wt 105.5 kg (232 lb 8 oz)   SpO2 96%   BMI 36.41 kg/m      Assessment:  33 year old adult previously diagnosed with schizoaffective disorder bipolar type, BPD, AVA, PTSD, ADHD, gender dysphoria, and polysubstance use disorder (opioids, THC, amphetamine, MDMA, nicotine) who presented voluntarily with increasing paranoia, AH, and manic behavior in the context of medication non-adherence and Ativan overdose, who is having bladder retention, likely secondary to history of cauda equina syndrome with neurogenic bladder.    Plan:  Obtain bladder scan and straight cath the patient if noted to be in urinary retention (per nurse, bladder scan residual was 494 mL, and as such the med flyer was called to help to straight cath the patient)  Collect urine for UA  Order placed for bladder scan with straight cath protocol every 4 hours if patient reports not voiding  Due to nursing staff report that patient does not find the straight cath comfortable and would prefer an indwelling Styles, order for indwelling Styles placed  Medicine consult placed for assistance with management and for additional recommendations       Marlen Black MD  Psychiatry PGY2    "

## 2023-11-12 NOTE — PLAN OF CARE
Goal Outcome Evaluation:    Plan of Care Reviewed With: patient      Problem: Psychotic Signs/Symptoms  Goal: Decreased Sensory Symptoms (Psychotic Signs/Symptoms)  Outcome: Progressing  Note: Pt presented with a flat affect. Pleasant on approach, but quiet and socially withdrawn. Pt endorsed anxiety - rated as a 7/10 and command auditory hallucinations - telling him to hurt others. Pt stated he does not want to act on the voices' commands. He denied depression and SI. Received prolixin per request for anxiety. Pt colored and worked on word finds in the dining area and visited with his cousin during the shift.     Pt reports trouble voiding. Stated a dx of neurogenic bladder that is causing him to feel like he is not fully emptying his bladder for the past two days. UA ordered 11/8 was collected. Provider paged and advised bladder scan, which was performed and yielded 494 mL in bladder. Pt was straight cath'd at 1100 with 1000 mL output. Bladder scan performed again at 1430, which resulted in 343 mL in bladder. Medical flyer called and she stated she was busy right now, but will call back in an hour or two.

## 2023-11-12 NOTE — PROGRESS NOTES
11/11/23 213   Group Therapy Session   Group Attendance attended group session   Time Session Began 2010   Time Session Ended 2100   Total Time (minutes) 50   Total # Attendees 5   Group Type life skill;task skill   Group Session Detail Education and group discussion provided on the benefits of changing negative thinking to positive with hands on task to make a Gratitude Calendar for the month of November for fostering hope, improving mood, reality-based activity, creative expression, fine motor skills, self-expression, building self-esteem, coping, healthy leisure and socialization.   Patient Participation Detail Pt was pleasant upon approach and took an active role in the group.  Pt worked on their calendar for the duration of the group session and volunteered to share the gratitude items they selected with the whole group.  Pt wsa curteous to others and accepted positive feedback.

## 2023-11-12 NOTE — PLAN OF CARE
"Patient alert and oriented x 4. Patient continuous on SIO for assault and SI. Patient endorsed AH stated voices telling him to hurt people. Patient appear calm, no behavioral outburst or any safety concerns at this time. Patient complained of headache- Tylenol given, patient reported effective, stated \"no pain\". VS  Blood pressure 125/88, pulse 93, temperature 98  F (36.7  C), temperature source Oral, resp. rate 16, height 1.702 m (5' 7\"), weight 105.2 kg (231 lb 14.4 oz), SpO2 98% on RA. Will continue to  monitor the patient and provide therapeutic intervention as needed. Will continue with current plan of care. Notify MD with any concerns.     Problem: Sleep Disturbance  Goal: Adequate Sleep/Rest  Outcome: Progressing     "

## 2023-11-13 LAB
GLUCOSE BLDC GLUCOMTR-MCNC: 124 MG/DL (ref 70–99)
GLUCOSE BLDC GLUCOMTR-MCNC: 142 MG/DL (ref 70–99)
GLUCOSE BLDC GLUCOMTR-MCNC: 179 MG/DL (ref 70–99)

## 2023-11-13 PROCEDURE — G0177 OPPS/PHP; TRAIN & EDUC SERV: HCPCS

## 2023-11-13 PROCEDURE — A9270 NON-COVERED ITEM OR SERVICE: HCPCS

## 2023-11-13 PROCEDURE — 250N000013 HC RX MED GY IP 250 OP 250 PS 637

## 2023-11-13 PROCEDURE — 124N000002 HC R&B MH UMMC

## 2023-11-13 PROCEDURE — 99232 SBSQ HOSP IP/OBS MODERATE 35: CPT | Performed by: PHYSICIAN ASSISTANT

## 2023-11-13 PROCEDURE — 99232 SBSQ HOSP IP/OBS MODERATE 35: CPT | Mod: GC | Performed by: STUDENT IN AN ORGANIZED HEALTH CARE EDUCATION/TRAINING PROGRAM

## 2023-11-13 PROCEDURE — 90832 PSYTX W PT 30 MINUTES: CPT

## 2023-11-13 PROCEDURE — 250N000013 HC RX MED GY IP 250 OP 250 PS 637: Performed by: PHYSICIAN ASSISTANT

## 2023-11-13 PROCEDURE — 250N000013 HC RX MED GY IP 250 OP 250 PS 637: Performed by: STUDENT IN AN ORGANIZED HEALTH CARE EDUCATION/TRAINING PROGRAM

## 2023-11-13 RX ORDER — ATOMOXETINE 10 MG/1
10 CAPSULE ORAL EVERY MORNING
Status: CANCELLED | OUTPATIENT
Start: 2023-11-13

## 2023-11-13 RX ORDER — CLONAZEPAM 0.5 MG/1
0.5 TABLET ORAL AT BEDTIME
Status: DISCONTINUED | OUTPATIENT
Start: 2023-11-13 | End: 2023-11-16

## 2023-11-13 RX ORDER — ATOMOXETINE 10 MG/1
10 CAPSULE ORAL EVERY MORNING
Status: DISCONTINUED | OUTPATIENT
Start: 2023-11-13 | End: 2023-11-13

## 2023-11-13 RX ORDER — OLANZAPINE 5 MG/1
5 TABLET ORAL 2 TIMES DAILY
Status: CANCELLED | OUTPATIENT
Start: 2023-11-13

## 2023-11-13 RX ORDER — PROPRANOLOL HYDROCHLORIDE 10 MG/1
10 TABLET ORAL DAILY PRN
Status: DISCONTINUED | OUTPATIENT
Start: 2023-11-13 | End: 2023-11-14

## 2023-11-13 RX ORDER — ATOMOXETINE 10 MG/1
10 CAPSULE ORAL DAILY
Status: CANCELLED | OUTPATIENT
Start: 2023-11-13

## 2023-11-13 RX ORDER — OLANZAPINE 5 MG/1
5 TABLET ORAL DAILY
Status: DISCONTINUED | OUTPATIENT
Start: 2023-11-14 | End: 2023-11-14

## 2023-11-13 RX ORDER — PROPRANOLOL HYDROCHLORIDE 10 MG/1
10 TABLET ORAL 3 TIMES DAILY
Status: CANCELLED | OUTPATIENT
Start: 2023-11-13

## 2023-11-13 RX ORDER — CLONAZEPAM 1 MG/1
1 TABLET ORAL DAILY
Status: DISCONTINUED | OUTPATIENT
Start: 2023-11-14 | End: 2023-11-22

## 2023-11-13 RX ADMIN — DOCUSATE SODIUM 100 MG: 100 CAPSULE, LIQUID FILLED ORAL at 08:23

## 2023-11-13 RX ADMIN — GABAPENTIN 1200 MG: 600 TABLET, FILM COATED ORAL at 08:18

## 2023-11-13 RX ADMIN — NICOTINE 1 PATCH: 21 PATCH, EXTENDED RELEASE TRANSDERMAL at 08:23

## 2023-11-13 RX ADMIN — PROPRANOLOL HYDROCHLORIDE 10 MG: 10 TABLET ORAL at 19:03

## 2023-11-13 RX ADMIN — PROPRANOLOL HYDROCHLORIDE 10 MG: 10 TABLET ORAL at 14:00

## 2023-11-13 RX ADMIN — NICOTINE POLACRILEX 4 MG: 4 GUM, CHEWING BUCCAL at 06:40

## 2023-11-13 RX ADMIN — LITHIUM CARBONATE 900 MG: 450 TABLET, EXTENDED RELEASE ORAL at 21:03

## 2023-11-13 RX ADMIN — INSULIN GLARGINE 25 UNITS: 100 INJECTION, SOLUTION SUBCUTANEOUS at 08:17

## 2023-11-13 RX ADMIN — EMPAGLIFLOZIN 10 MG: 10 TABLET, FILM COATED ORAL at 08:18

## 2023-11-13 RX ADMIN — DOXYCYCLINE HYCLATE 100 MG: 100 CAPSULE ORAL at 19:03

## 2023-11-13 RX ADMIN — NICOTINE POLACRILEX 4 MG: 4 GUM, CHEWING BUCCAL at 20:41

## 2023-11-13 RX ADMIN — CLONAZEPAM 0.5 MG: 0.5 TABLET ORAL at 21:03

## 2023-11-13 RX ADMIN — NICOTINE POLACRILEX 4 MG: 4 GUM, CHEWING BUCCAL at 18:24

## 2023-11-13 RX ADMIN — OLANZAPINE 10 MG: 10 TABLET, FILM COATED ORAL at 16:01

## 2023-11-13 RX ADMIN — ROSUVASTATIN 40 MG: 20 TABLET, FILM COATED ORAL at 08:23

## 2023-11-13 RX ADMIN — DOCUSATE SODIUM 100 MG: 100 CAPSULE, LIQUID FILLED ORAL at 19:03

## 2023-11-13 RX ADMIN — CLONAZEPAM 1 MG: 1 TABLET ORAL at 08:23

## 2023-11-13 RX ADMIN — TESTOSTERONE 50 MG OF TESTOSTERONE: 50 GEL TRANSDERMAL at 08:18

## 2023-11-13 RX ADMIN — GABAPENTIN 1200 MG: 600 TABLET, FILM COATED ORAL at 19:03

## 2023-11-13 RX ADMIN — PROPRANOLOL HYDROCHLORIDE 10 MG: 10 TABLET ORAL at 06:44

## 2023-11-13 RX ADMIN — AMLODIPINE BESYLATE 10 MG: 5 TABLET ORAL at 06:45

## 2023-11-13 RX ADMIN — NICOTINE POLACRILEX 4 MG: 4 GUM, CHEWING BUCCAL at 09:34

## 2023-11-13 RX ADMIN — NICOTINE POLACRILEX 4 MG: 4 GUM, CHEWING BUCCAL at 13:18

## 2023-11-13 RX ADMIN — NICOTINE POLACRILEX 4 MG: 4 GUM, CHEWING BUCCAL at 15:03

## 2023-11-13 RX ADMIN — OLANZAPINE 10 MG: 10 TABLET, FILM COATED ORAL at 08:23

## 2023-11-13 RX ADMIN — POLYETHYLENE GLYCOL 3350 17 G: 17 POWDER, FOR SOLUTION ORAL at 08:17

## 2023-11-13 RX ADMIN — QUETIAPINE FUMARATE 200 MG: 200 TABLET ORAL at 21:04

## 2023-11-13 RX ADMIN — NICOTINE POLACRILEX 4 MG: 4 GUM, CHEWING BUCCAL at 11:23

## 2023-11-13 RX ADMIN — FLUPHENAZINE HYDROCHLORIDE 5 MG: 2.5 TABLET, FILM COATED ORAL at 21:03

## 2023-11-13 RX ADMIN — PROPRANOLOL HYDROCHLORIDE 10 MG: 10 TABLET ORAL at 11:23

## 2023-11-13 RX ADMIN — OLANZAPINE 5 MG: 5 TABLET, FILM COATED ORAL at 21:04

## 2023-11-13 RX ADMIN — DOXYCYCLINE HYCLATE 100 MG: 100 CAPSULE ORAL at 08:18

## 2023-11-13 RX ADMIN — GABAPENTIN 1200 MG: 600 TABLET, FILM COATED ORAL at 13:39

## 2023-11-13 RX ADMIN — PANTOPRAZOLE SODIUM 40 MG: 40 TABLET, DELAYED RELEASE ORAL at 06:38

## 2023-11-13 ASSESSMENT — ACTIVITIES OF DAILY LIVING (ADL)
ADLS_ACUITY_SCORE: 42
ORAL_HYGIENE: INDEPENDENT
ADLS_ACUITY_SCORE: 42
LAUNDRY: UNABLE TO COMPLETE
ADLS_ACUITY_SCORE: 42
HYGIENE/GROOMING: INDEPENDENT
ADLS_ACUITY_SCORE: 42
DRESS: INDEPENDENT
ADLS_ACUITY_SCORE: 42

## 2023-11-13 NOTE — PLAN OF CARE
Problem: Adult Inpatient Plan of Care  Goal: Plan of Care Review  Description: The Plan of Care Review/Shift note should be completed every shift.  The Outcome Evaluation is a brief statement about your assessment that the patient is improving, declining, or no change.  This information will be displayed automatically on your shift  note.  Outcome: Progressing     Problem: Adult Behavioral Health Plan of Care  Goal: Plan of Care Review  Outcome: Progressing  Flowsheets (Taken 11/12/2023 1600)  Patient Agreement with Plan of Care: agrees     Problem: Anxiety  Goal: Anxiety Reduction or Resolution  Outcome: Progressing     Problem: Pain Acute  Goal: Optimal Pain Control and Function  Outcome: Progressing   Goal Outcome Evaluation:    Plan of Care Reviewed With: patient        Pt C/O of lower abdominal pain, rated 10/10 and very uncomfortable, stated he feels the urge of voiding but he's not able to do so. PRN Tylenol 650 mg and Zyprexa 10mg was administered and patient stated it was ineffective. Encouraged the patient to drink and sit on the toilet but nothing worked. Before dinner time patient got more agitated and uncomfortable, couldn't stay still, bladder scan done @ 1745 and the volume was 683cc. Pt had requested earlier the previous shift indwelling ferreira catheter instead of straight cathing every time and he did request the same on this shift. The resident on-call was paged and the order for the ferreira was placed. The flyer were called for the placement and they did put the ferreira in, in about 10min 1600 ml was emptied from the ferreira. In about 30 min patient was again requesting the catheter to be taken off, the doctor was called and the ferreira was discontinued. Hygiene is appropriate, adequate PO intake, medication compliant.    Since they ferreira was taken off pt has been sleeping with no any reported discomfort or urinary retention. Will continue to monitor and follow the plan of care.    /81   Pulse 91    Temp 98  F (36.7  C) (Oral)   Resp 20   SpO2 95%
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
## 2023-11-13 NOTE — PLAN OF CARE
Duration: Met with patient for a total of 20 minutes.    Time start: 1245  Time end 1305    Modality Used:DBT and Brief Therapy    Goals: Reduction in the volume and intensity of commands in auditory hallucinations    Pt progress: Focus of session was on Pt's weekend; Pt reports his command auditory hallucinations are increasingly louder. Pt reports he believes voices will never go away, at baseline they are more quite and he is able to function normally.    Pt reports utilizing coping skills to manage self-control. Pt primarily uses distraction; word searches, art. Discussed grounding/mindfulness techniques as additional coping, Pt reports them as helpful in the past. Pt feels this hospitalization is going better than previous 2 admissions this year d/t good care team and Pt's response to tx and medication changes. Writer offered positive reinforcement and encouraged Pt to try adding grounding techniques to coping strategies.    Treatment Objective(s) Addressed:   The focus of this session was on identifying and practicing coping strategies and building self-esteem       Therapeutic Intervention(s):   Provided active listening, unconditional positive regard, and validation. Coached on coping techniques/relaxation skills to help improve distress tolerance and managing intense emotions. Identified stress relief practices. Introduced and discussed radical acceptance.    Plan/next step: Made plan to check in with P Wednesday, encouraged Pt to attend group sessions.        Lydia Salazar [facility]  Psychotherapist

## 2023-11-13 NOTE — PLAN OF CARE
"  Problem: Adult Inpatient Plan of Care  Goal: Plan of Care Review  Description: The Plan of Care Review/Shift note should be completed every shift.  The Outcome Evaluation is a brief statement about your assessment that the patient is improving, declining, or no change.  This information will be displayed automatically on your shift  note.  Outcome: Progressing  Flowsheets (Taken 11/13/2023 1503)  Plan of Care Reviewed With: patient  Overall Patient Progress: improving  Goal: Patient-Specific Goal (Individualized)  Description: You can add care plan individualizations to a care plan. Examples of Individualization might be:  \"Parent requests to be called daily at 9am for status\", \"I have a hard time hearing out of my right ear\", or \"Do not touch me to wake me up as it startles  me\".  Outcome: Progressing  Goal: Absence of Hospital-Acquired Illness or Injury  Outcome: Progressing  Intervention: Identify and Manage Fall Risk  Recent Flowsheet Documentation  Taken 11/13/2023 0941 by Shelia Byers RN  Safety Promotion/Fall Prevention:   safety round/check completed   nonskid shoes/slippers when out of bed  Intervention: Prevent Skin Injury  Recent Flowsheet Documentation  Taken 11/13/2023 0941 by Shelia Byers RN  Body Position: position changed independently  Goal: Optimal Comfort and Wellbeing  Outcome: Progressing  Intervention: Provide Person-Centered Care  Recent Flowsheet Documentation  Taken 11/13/2023 0941 by Shelia Byers RN  Trust Relationship/Rapport:   care explained   choices provided   questions answered  Goal: Readiness for Transition of Care  Outcome: Progressing     Problem: Seclusion/Restraint, Behavioral  Goal: Seclusion/Behavioral Restraint Goal: Absence of Harm or Injury  Outcome: Progressing  Intervention: Protect Dignity, Rights, and Personal Wellbeing  Recent Flowsheet Documentation  Taken 11/13/2023 0941 by Shelia Byers RN  Trust Relationship/Rapport:   care explained   choices provided   " "questions answered  Intervention: Protect Skin and Joint Integrity  Recent Flowsheet Documentation  Taken 11/13/2023 0941 by Shelia Byers RN  Body Position: position changed independently     Problem: Adult Behavioral Health Plan of Care  Goal: Plan of Care Review  Outcome: Progressing  Flowsheets (Taken 11/13/2023 1503)  Plan of Care Reviewed With: patient  Overall Patient Progress: improving  Goal: Patient-Specific Goal (Individualization)  Description: You can add care plan individualizations to a care plan. Examples of Individualization might be:  \"Parent requests to be called daily at 9am for status\", \"I have a hard time hearing out of my right ear\", or \"Do not touch me to wake me up as it startles  me\".  Outcome: Progressing  Goal: Adheres to Safety Considerations for Self and Others  Outcome: Progressing  Goal: Absence of New-Onset Illness or Injury  Outcome: Progressing  Goal: Optimized Coping Skills in Response to Life Stressors  Outcome: Progressing  Goal: Develops/Participates in Therapeutic [place] to Support Successful Transition  Outcome: Progressing  Intervention: Foster Therapeutic [place]  Recent Flowsheet Documentation  Taken 11/13/2023 0941 by Shelia Byers RN  Trust Relationship/Rapport:   care explained   choices provided   questions answered     Problem: Anxiety  Goal: Anxiety Reduction or Resolution  Outcome: Progressing     Problem: Psychotic Signs/Symptoms  Goal: Improved Behavioral Control (Psychotic Signs/Symptoms)  Outcome: Progressing  Goal: Optimal Cognitive Function (Psychotic Signs/Symptoms)  Outcome: Progressing  Intervention: Support and Promote Cognitive Ability  Recent Flowsheet Documentation  Taken 11/13/2023 0941 by Shelia Byers RN  Trust Relationship/Rapport:   care explained   choices provided   questions answered  Goal: Increased Participation and Engagement (Psychotic Signs/Symptoms)  Outcome: Progressing  Goal: Improved Mood Symptoms (Psychotic " Signs/Symptoms)  Outcome: Progressing  Goal: Improved Psychomotor Symptoms (Psychotic Signs/Symptoms)  Outcome: Progressing  Goal: Decreased Sensory Symptoms (Psychotic Signs/Symptoms)  Outcome: Progressing  Goal: Improved Sleep (Psychotic Signs/Symptoms)  Outcome: Progressing  Goal: Enhanced Social, Occupational or Functional Skills (Psychotic Signs/Symptoms)  Outcome: Progressing  Intervention: Promote Social, Occupational and Functional Ability  Recent Flowsheet Documentation  Taken 11/13/2023 0941 by Shelia Byers RN  Trust Relationship/Rapport:   care explained   choices provided   questions answered     Problem: Pain Acute  Goal: Optimal Pain Control and Function  Outcome: Progressing     Problem: Sleep Disturbance  Goal: Adequate Sleep/Rest  Outcome: Progressing     Problem: Suicidal Behavior  Goal: Suicidal Behavior is Absent or Managed  Outcome: Progressing   Goal Outcome Evaluation:      Plan of Care Reviewed With: patient    Overall Patient Progress: improving  Patient has been visible in the milieu.Sociable with staff and peers.Medication compliant.Patient talked to his mother on the phone a couple of times.Denies pain.  Endorsed anxiety @ 6/10,PRN Inderal given with relief and patient was in his room sleeping this afternoon with Head phones in place.Medicine saw patient and ordered UC and prn straight cath. Post void residual of 350 ML.Patient has been voiding okay no cath. Done this shift.Blood sugar checks before meals,124 @ noon.Lantus 25 Units  with breakfast.Patient denies SI/SIB but endorses hearing voices and he is contracted for safety.  Temp: 97.7  F (36.5  C) Temp src: Oral BP: 119/79 Pulse: 94   Resp: 17 SpO2: 96 % O2 Device: None (Room air)

## 2023-11-13 NOTE — PHARMACY-CONSULT NOTE
"Pharmacy Consult   Pharmacist was consulted by Marv Jackson PA-C    Briefly describe the reason for this consult: \"Please see if any of his meds are causing current acute urinary retention.\"    \"Prakash\" is a 33 year old transgender male with previous psychiatric diagnoses of schizoaffective disorder bipolar type, Bipolar Disorder, AVA, PTSD, ADHD, gender dysphoria, and polysubstance use disorder (opioids, THC, amphetamine, MDMA, nicotine) admitted from the ED on 11/07/2023 due to concern for increasing paranoia, AH, and manic behavior in the context of Ativan overdose and medication non-adherence. Pt is currently experiencing urinary retention with a relevant hx of cauda equina syndrome, resolved, s/p surgery, 2017.    Summary of recommendations from follow-up medicine consult note on 11/13 from Marv Jackson PA-C:  ASSESSMENT: Acute urinary retention, slightly improved into this am. Doubt related to past surgically repaired cauda equina syndrome, new UTI or obstructive uropathy.      PLAN:  Will obtain routine UC. Pharmacy consult to see if related to adverse effect of one or more of his current meds. Continue bladder scans with parameters to continue prn straight-cath'ing. Medicine will continue to follow. Please feel free to call with questions.     Medications (as of 11/13/23):     amLODIPine  10 mg Oral Daily    clonazePAM  0.5 mg Oral At Bedtime    [START ON 11/14/2023] clonazePAM  1 mg Oral Daily    docusate sodium  100 mg Oral BID    doxycycline hyclate  100 mg Oral Q12H [facility] (08/20)    empagliflozin  10 mg Oral Daily    [START ON 11/15/2023] fluPHENAZine  10 mg Oral At Bedtime    fluPHENAZine  5 mg Oral At Bedtime    gabapentin  1,200 mg Oral TID    insulin glargine  25 Units Subcutaneous QAM AC    lithium ER  900 mg Oral At Bedtime    nicotine  1 patch Transdermal Daily    nicotine   Transdermal Q8H    [START ON 11/14/2023] OLANZapine  5 mg Oral Daily    OLANZapine  5 mg Oral At Bedtime " "   pantoprazole  40 mg Oral Daily    polyethylene glycol  17 g Oral Daily    propranolol  10 mg Oral TID    QUEtiapine  200 mg Oral At Bedtime    rosuvastatin  40 mg Oral Daily    testosterone  50 mg of testosterone Transdermal Daily    tretinoin   Topical At Bedtime      PRN medications currently being utilized:  acetaminophen, alum & mag hydroxide-simethicone, artificial saliva, glucose **OR** dextrose **OR** glucagon, fluPHENAZine, hydrALAZINE, melatonin, nicotine polacrilex, OLANZapine **OR** OLANZapine, propranolol, sulindac    Assessment     Urinary Retention:    Patient a new start on fluphenazine which is a typical antipsychotic that has some anticholinergic properties and post-marketing reports of bladder paralysis (incidence unknown). Per chart review, Prakash expresses openness to modifying his medications but consistently reports that klonopin is the \"only med currently helping me\". Prakash communicates that he has taken long-acting antipsychotics in the past, specifically prolixin DOMINGUEZ. He says that when he was on prolixin, he experiences the \"most stable period of my life\". He is interested and open to restarting this medication PO and switching eventually to the DOMINGUEZ. The pt is also taking olanzapine and quetiapine which are atypical antipsychotics that also have anticholinergic properties. These drugs have anti-cholinergic properties which can contribute to urinary retention. Pt is also on empagliflozin which can cause an increase in urination d/t more glucose being shunted through the kidneys into the bladder and could also contribute to UTIs. Pt is also taking lithium which has common adverse reactions associated with it as polyuria and polydipsia.    I am unable to assess the pt's fluid intake and whether a lack of fluids is contributing. During pt interview at rounds today, the pt stated that he performs a straight cath at home every once in a while when urinary retention occurs and that this is not " out of the ordinary given the relevant hx of cauda equina syndrome.    ADHD    Pt describes issues with focusing and task initiation. Pt aware that restarting ADDERALL is not a good option given substance abuse history. Pt is persistent on restarting atomoxetine to help with these symptoms. Atomoxetine, being an NRI, can increase urinary hesitation/retention.    Plan    Continue current medication therapy plan to taper olanzapine and quetiapine as Fluphenazine is being up-titrated to target dose with eventual goal of transitioning to a fluphenazine DOMINGUEZ.  Re-assess urinary retention when antipsychotics are at their target doses.  Re-assess potential to start atomoxetine after other anti-cholinergic medications are minimized above.  Avoid additional anticholinergic medications when possible as patient is already on olanzapine which has more anticholinergic effects than quetiapine.   Utilize non-pharmacological treatments such as adequate fluid intake, I/O monitoring, and monitoring of ongoing symptoms.    Dani Bowman, Pharm.D., R.Ph., PGY1 Resident

## 2023-11-13 NOTE — CONSULTS
"Brief medicine follow up note:  - Pt c/o urinary retention that started yesterday, requiring straight cath for urine due to elevated PVR. Styles placed and removed per pt request. Into this morning. Pt able to urinate, but states difficult to initiate stream and feels bladder not fully emptied. Denies assoc sxs including fever, chills, nausea, abd pain, and other LUTS including dysuria. The aforementioned within context of pt having cauda equina syndrome, resolved, s/p surgery, 2017. States chronic low back pain and radiculopathy at baseline with no new abnormal neurologic concerns.      GEN: Pleasant, in NAD  Blood pressure (!) 143/102, pulse 93, temperature 97.7  F (36.5  C), resp. rate 17, height 1.702 m (5' 7\"), weight 105.5 kg (232 lb 8 oz), SpO2 96%, unknown if currently breastfeeding.     UA RESULTS:  Recent Labs   Lab Test 11/12/23  1036 12/10/20  1526 11/13/20  1455   COLOR Straw   < > Yellow   APPEARANCE Slightly Cloudy*   < > Slightly Cloudy   URINEGLC >=1000*   < > Negative   URINEBILI Negative   < > Negative   URINEKETONE Negative   < > Negative   SG 1.008   < > 1.015   UBLD Trace*   < > Trace*   URINEPH 7.0   < > 6.5   PROTEIN Negative   < > Negative   UROBILINOGEN  --   --  0.2   NITRITE Negative   < > Negative   LEUKEST Small*   < > Trace*   RBCU 2   < > 2-5*   WBCU 6*   < > 0 - 5    < > = values in this interval not displayed.      ASSESSMENT: Acute urinary retention, slightly improved into this am. Doubt related to past surgically repaired cauda equina syndrome, new UTI or obstructive uropathy.     PLAN:  Will obtain routine UC. Pharmacy consult to see if related to adverse effect of one or more of his current meds. Continue bladder scans with parameters to continue prn straight-cath'ing. Medicine will continue to follow. Please feel free to call with questions.       Yaya Jackson PA-C  Internal Medicine Hospitalist   [facility]  [phone]   "

## 2023-11-13 NOTE — PLAN OF CARE
"  Problem: Sleep Disturbance  Goal: Adequate Sleep/Rest  Outcome: Progressing   Patient alert and oriented x 4. Patient denies pain an discomfort. VS Blood pressure (!) 133/99, pulse 93, temperature 97.7  F (36.5  C), resp. rate 17, height 1.702 m (5' 7\"), weight 105.5 kg (232 lb 8 oz), SpO2 96% on RA. Patient denies chest pain, SOB, dizziness, lightheadedness, headache or any N/V. No respiratory distress noted. Patient's Propanolol and Norvasc scheduled given early this morning. Patient sleep for 7 hours. Patient voided good amount of urine 2x per his report. Patient denies urinary discomfort.  Will continue to  monitor the patient and provide therapeutic intervention as needed. Will continue with current plan of care. Notify MD with any concerns.   "

## 2023-11-13 NOTE — PLAN OF CARE
Assessment/Intervention/Current Symtoms and Care Coordination:  Chart reviewed and patient met with team,   Discussed patient progress, symptomology, and response to treatment.  Discussed the discharge plan and any potential impediments to discharge.     Patient met with team.  Reports continued AH to harm others but no intent to act on them.  Also experiencing urinary retention x past 3 days.  Meds being reviewed and adjusted per MD's.    Patient CM found a potential new GH - Patient informed writer he is not interested in the area it is located in.  Writer has left CM a msg and has also requested again that she call patient per his request.      Discharge Plan or Goal:  GH Placement  Psychiatry  Therapy  IOP     Barriers to Discharge:  Severity of symptoms  Need for continued med mgmt per MD's.  Patient is on a benzo taper as well.  PLacement needed     Referral Status:  IOP intake completed 11/10/23     Legal Status:  Committed- PD revoked 10/31/23     [facility]: [place]  File Number:  [id number]  Start and expiration date of commitment:   6/9/23 - 12/9/23?     Contacts:  Outpatient Psychiatrist: Regine Last [facility]  Primary Physician: KELIN Edwards  [facility] : Cristy Garrison [phone]  [facility] CM:Pretty Guzman,:[phone]  Family Members: Negra Prasad ([phone])     Upcoming Meetings and Dates/Important Information and next steps:     Team Update:   Wed

## 2023-11-13 NOTE — PLAN OF CARE

## 2023-11-14 ENCOUNTER — APPOINTMENT (OUTPATIENT)
Dept: GENERAL RADIOLOGY | Facility: CLINIC | Age: 33
DRG: 885 | End: 2023-11-14
Payer: MEDICARE

## 2023-11-14 LAB
ATRIAL RATE - MUSE: 85 BPM
BACTERIA UR CULT: NORMAL
DIASTOLIC BLOOD PRESSURE - MUSE: NORMAL MMHG
GLUCOSE BLDC GLUCOMTR-MCNC: 137 MG/DL (ref 70–99)
GLUCOSE BLDC GLUCOMTR-MCNC: 160 MG/DL (ref 70–99)
GLUCOSE BLDC GLUCOMTR-MCNC: 165 MG/DL (ref 70–99)
INTERPRETATION ECG - MUSE: NORMAL
P AXIS - MUSE: 36 DEGREES
PR INTERVAL - MUSE: 180 MS
QRS DURATION - MUSE: 98 MS
QT - MUSE: 376 MS
QTC - MUSE: 447 MS
R AXIS - MUSE: 20 DEGREES
SYSTOLIC BLOOD PRESSURE - MUSE: NORMAL MMHG
T AXIS - MUSE: 20 DEGREES
VENTRICULAR RATE- MUSE: 85 BPM

## 2023-11-14 PROCEDURE — 250N000013 HC RX MED GY IP 250 OP 250 PS 637

## 2023-11-14 PROCEDURE — 73130 X-RAY EXAM OF HAND: CPT | Mod: RT

## 2023-11-14 PROCEDURE — 250N000013 HC RX MED GY IP 250 OP 250 PS 637: Performed by: STUDENT IN AN ORGANIZED HEALTH CARE EDUCATION/TRAINING PROGRAM

## 2023-11-14 PROCEDURE — 250N000011 HC RX IP 250 OP 636

## 2023-11-14 PROCEDURE — 124N000002 HC R&B MH UMMC

## 2023-11-14 PROCEDURE — A9270 NON-COVERED ITEM OR SERVICE: HCPCS

## 2023-11-14 PROCEDURE — H2032 ACTIVITY THERAPY, PER 15 MIN: HCPCS

## 2023-11-14 PROCEDURE — 99232 SBSQ HOSP IP/OBS MODERATE 35: CPT | Performed by: STUDENT IN AN ORGANIZED HEALTH CARE EDUCATION/TRAINING PROGRAM

## 2023-11-14 PROCEDURE — 250N000013 HC RX MED GY IP 250 OP 250 PS 637: Performed by: PHYSICIAN ASSISTANT

## 2023-11-14 RX ORDER — PROPRANOLOL HYDROCHLORIDE 10 MG/1
10 TABLET ORAL 2 TIMES DAILY PRN
Status: DISCONTINUED | OUTPATIENT
Start: 2023-11-14 | End: 2023-11-29 | Stop reason: HOSPADM

## 2023-11-14 RX ORDER — ONDANSETRON 4 MG/1
4 TABLET, FILM COATED ORAL EVERY 6 HOURS PRN
Status: DISCONTINUED | OUTPATIENT
Start: 2023-11-14 | End: 2023-11-29 | Stop reason: HOSPADM

## 2023-11-14 RX ADMIN — PANTOPRAZOLE SODIUM 40 MG: 40 TABLET, DELAYED RELEASE ORAL at 07:07

## 2023-11-14 RX ADMIN — GABAPENTIN 1200 MG: 600 TABLET, FILM COATED ORAL at 13:47

## 2023-11-14 RX ADMIN — AMLODIPINE BESYLATE 10 MG: 5 TABLET ORAL at 08:08

## 2023-11-14 RX ADMIN — GABAPENTIN 1200 MG: 600 TABLET, FILM COATED ORAL at 18:49

## 2023-11-14 RX ADMIN — POLYETHYLENE GLYCOL 3350 17 G: 17 POWDER, FOR SOLUTION ORAL at 08:08

## 2023-11-14 RX ADMIN — TESTOSTERONE 50 MG OF TESTOSTERONE: 50 GEL TRANSDERMAL at 08:09

## 2023-11-14 RX ADMIN — NICOTINE POLACRILEX 4 MG: 4 GUM, CHEWING BUCCAL at 12:46

## 2023-11-14 RX ADMIN — FLUPHENAZINE HYDROCHLORIDE 1 MG: 1 TABLET, FILM COATED ORAL at 15:22

## 2023-11-14 RX ADMIN — OLANZAPINE 5 MG: 5 TABLET, FILM COATED ORAL at 08:09

## 2023-11-14 RX ADMIN — EMPAGLIFLOZIN 10 MG: 10 TABLET, FILM COATED ORAL at 08:09

## 2023-11-14 RX ADMIN — ONDANSETRON 4 MG: 4 TABLET ORAL at 08:27

## 2023-11-14 RX ADMIN — DOCUSATE SODIUM 100 MG: 100 CAPSULE, LIQUID FILLED ORAL at 18:49

## 2023-11-14 RX ADMIN — PROPRANOLOL HYDROCHLORIDE 10 MG: 10 TABLET ORAL at 13:48

## 2023-11-14 RX ADMIN — CLONAZEPAM 0.5 MG: 0.5 TABLET ORAL at 22:19

## 2023-11-14 RX ADMIN — DOCUSATE SODIUM 100 MG: 100 CAPSULE, LIQUID FILLED ORAL at 08:09

## 2023-11-14 RX ADMIN — CLONAZEPAM 1 MG: 1 TABLET ORAL at 08:09

## 2023-11-14 RX ADMIN — NICOTINE POLACRILEX 4 MG: 4 GUM, CHEWING BUCCAL at 14:15

## 2023-11-14 RX ADMIN — GABAPENTIN 1200 MG: 600 TABLET, FILM COATED ORAL at 08:08

## 2023-11-14 RX ADMIN — NICOTINE POLACRILEX 4 MG: 4 GUM, CHEWING BUCCAL at 07:07

## 2023-11-14 RX ADMIN — ROSUVASTATIN 40 MG: 20 TABLET, FILM COATED ORAL at 08:08

## 2023-11-14 RX ADMIN — NICOTINE POLACRILEX 4 MG: 4 GUM, CHEWING BUCCAL at 11:04

## 2023-11-14 RX ADMIN — FLUPHENAZINE HYDROCHLORIDE 5 MG: 2.5 TABLET, FILM COATED ORAL at 22:19

## 2023-11-14 RX ADMIN — NICOTINE 1 PATCH: 21 PATCH, EXTENDED RELEASE TRANSDERMAL at 08:07

## 2023-11-14 RX ADMIN — NICOTINE POLACRILEX 4 MG: 4 GUM, CHEWING BUCCAL at 22:40

## 2023-11-14 RX ADMIN — NICOTINE POLACRILEX 4 MG: 4 GUM, CHEWING BUCCAL at 18:51

## 2023-11-14 RX ADMIN — LITHIUM CARBONATE 900 MG: 450 TABLET, EXTENDED RELEASE ORAL at 20:00

## 2023-11-14 RX ADMIN — NICOTINE POLACRILEX 4 MG: 4 GUM, CHEWING BUCCAL at 17:35

## 2023-11-14 RX ADMIN — DOXYCYCLINE HYCLATE 100 MG: 100 CAPSULE ORAL at 18:49

## 2023-11-14 RX ADMIN — QUETIAPINE FUMARATE 200 MG: 200 TABLET ORAL at 22:19

## 2023-11-14 RX ADMIN — NICOTINE POLACRILEX 4 MG: 4 GUM, CHEWING BUCCAL at 16:32

## 2023-11-14 RX ADMIN — PROPRANOLOL HYDROCHLORIDE 10 MG: 10 TABLET ORAL at 18:48

## 2023-11-14 RX ADMIN — ACETAMINOPHEN 650 MG: 325 TABLET, FILM COATED ORAL at 03:58

## 2023-11-14 RX ADMIN — DOXYCYCLINE HYCLATE 100 MG: 100 CAPSULE ORAL at 08:08

## 2023-11-14 RX ADMIN — FLUPHENAZINE HYDROCHLORIDE 1 MG: 1 TABLET, FILM COATED ORAL at 19:59

## 2023-11-14 RX ADMIN — PROPRANOLOL HYDROCHLORIDE 10 MG: 10 TABLET ORAL at 08:08

## 2023-11-14 RX ADMIN — ACETAMINOPHEN 650 MG: 325 TABLET, FILM COATED ORAL at 20:24

## 2023-11-14 RX ADMIN — OLANZAPINE 5 MG: 5 TABLET, FILM COATED ORAL at 20:00

## 2023-11-14 RX ADMIN — INSULIN GLARGINE 25 UNITS: 100 INJECTION, SOLUTION SUBCUTANEOUS at 08:10

## 2023-11-14 ASSESSMENT — ACTIVITIES OF DAILY LIVING (ADL)
ADLS_ACUITY_SCORE: 42
ADLS_ACUITY_SCORE: 42
HYGIENE/GROOMING: INDEPENDENT
DRESS: INDEPENDENT
ADLS_ACUITY_SCORE: 42
ORAL_HYGIENE: INDEPENDENT
ADLS_ACUITY_SCORE: 42
LAUNDRY: UNABLE TO COMPLETE
ADLS_ACUITY_SCORE: 42

## 2023-11-14 NOTE — CARE PLAN
"[facility] Occupational Therapy Group Intervention Note     11/14/23 1450   Group Therapy Session   Group Attendance attended group session   Time Session Began 1030   Time Session Ended 1200   Total Time (minutes) 30 (no charge)   Total # Attendees 8   Group Type task skill;life skill   Group Topic Covered coping skills/lifestyle management;relapse prevention;cognitive activities   Group Session Detail Clinic - coping skill exploration, creative expression within personally meaningful activities, and observation of social, cognitive, and kinesthetic performance skills   Patient Response/Contribution cooperative with task;discussed personal experience with topic   Patient Participation Detail Pt requested to gather a [employer] project to-go as stated \"I can't stay I have really bad social anxiety\". IND to select a project and excused self to room. Later returned for a journal. Pt was informed we no longer have prefabricated journals however she may make one in Clinic. Pt agreed to make her own with mod assist from writer. Brightened and expressed appreciation once complete.      Callie Benjamin, OT on 11/14/2023 at 2:51 PM    "

## 2023-11-14 NOTE — PLAN OF CARE
Problem: Anxiety  Goal: Anxiety Reduction or Resolution  Outcome: Progressing     Problem: Psychotic Signs/Symptoms  Goal: Improved Mood Symptoms (Psychotic Signs/Symptoms)  Outcome: Progressing     Problem: Psychotic Signs/Symptoms  Goal: Improved Behavioral Control (Psychotic Signs/Symptoms)  Outcome: Progressing   Goal Outcome Evaluation:  Patient up and visible on unit, endorsed moderate anxiety 5/10, denies depression, SI, SI and stated auditory hallucination is not consistent. Patient compliant with medications, blood sugar 160 and 137, intake is adequate and he is dress appropriately on unit. Patient using distractions, music and Nintendo to manage anxiety, no antianxiety PRN medication given except nicotine gum; vitals WNL, PRN Zofran given today at the start of shift for nausea, medication was effect and no further complained of nausea. Patient able to attend and participate in group partially, he came out and reported feeling over stimulated when he is in room with too many people. Continue to monitor.        At 15:25 patient requested for PRN Med stated the voices in his head are louder, stated that he rather take Zyprexa but med was not due at this time, patient took PRN Prolixin 1 mg.

## 2023-11-14 NOTE — PLAN OF CARE
"Patient denied SI, HI but endorsed anxiety 5/10, denied depression. Patient complained of headache (posterior) Tylenol given and effective. VS Blood pressure 117/78, pulse 61, temperature 97.7  F (36.5  C), temperature source Oral, resp. rate 17, height 1.702 m (5' 7\"), weight 105.5 kg (232 lb 8 oz), SpO2 96% on RA. Patient stated hearing voices, voices telling him to hurts somebody. Patient listening to music-headphones on. Continue with SIO (1:1). Patient had 5.75 hours of sleep. Patient calm- no behavioral outburst so far this shift. Will continue with current plan of care. Notify MD with any concerns.     Problem: Sleep Disturbance  Goal: Adequate Sleep/Rest  Outcome: Progressing     Problem: Pain Acute  Goal: Optimal Pain Control and Function  Intervention: Develop Pain Management Plan  Recent Flowsheet Documentation  Taken 11/14/2023 0356 by Dashawn Salvador, RN  Pain Management Interventions: medication (see MAR)     Problem: Adult Inpatient Plan of Care  Goal: Optimal Comfort and Wellbeing  Intervention: Monitor Pain and Promote Comfort  Recent Flowsheet Documentation  Taken 11/14/2023 0356 by Dashawn Salvador, RN  Pain Management Interventions: medication (see MAR)     "

## 2023-11-14 NOTE — PROGRESS NOTES
11/14/2023    Group Therapy Session   Group Attendance attended group session   Time Session Began 1415   Time Session Ended 1500   Total Time (minutes) 15   Total # Attendees 4   Group Type psychotherapeutic   Group Topic Covered Group Topic Covered: coping skills/lifestyle management, relationship, emotions/expression   Group Session Detail Structured therapeutic group with a focus on insight, positive change, choices and problem solving via questions and verbal interactions.    Patient Response/Contribution listened actively, did not discuss personal experience with topic   Patient Participation Detail Pt was reluctant to join group - reports he does not like playing games in group. Pt stayed for independent activity, participated minimally in discussion afterward. Pt not interested in second activity and excused himself from group and did not return. Pt appeared brighter and interested in group members but did not want to share himself.
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
## 2023-11-14 NOTE — PLAN OF CARE
Problem: Psychotic Signs/Symptoms  Goal: Increased Participation and Engagement (Psychotic Signs/Symptoms)  Outcome: Progressing   Goal Outcome Evaluation:      Patient stable on this shift, vitals were  fine. No bladder scan or straight  cath   Problem: Psychotic Signs/Symptoms  Goal: Increased Participation and Engagement (Psychotic Signs/Symptoms)  Outcome: Progressing       Patient alert and oriented, vitals were stable,  blood sugar was 179 at 1700.  Patient has an order to bladder scan every 4 hours if unable to void.  Patient reported voiding just fine on this shift.      Patient denied SI/SIB or feeling depressed, but rated anxiety 9/10 was given PRN Zyprexa 10 mg. Patient was still complaining of high anxiety level. Patient said, the PRN Zyprexa is not helping him. Educated patient to discuss his concern with the provider tomorrow. Patient was visible on the unit throughout the shift with SIO staff. Patient attended group    Patient reported eating 100% of dinner. Medication compliant, denied pain or any discomfort on this shift. No concern with behavior,  patient continues on SIO for safety.

## 2023-11-15 LAB
GLUCOSE BLDC GLUCOMTR-MCNC: 105 MG/DL (ref 70–99)
GLUCOSE BLDC GLUCOMTR-MCNC: 139 MG/DL (ref 70–99)
GLUCOSE BLDC GLUCOMTR-MCNC: 210 MG/DL (ref 70–99)

## 2023-11-15 PROCEDURE — 250N000013 HC RX MED GY IP 250 OP 250 PS 637

## 2023-11-15 PROCEDURE — G0177 OPPS/PHP; TRAIN & EDUC SERV: HCPCS

## 2023-11-15 PROCEDURE — A9270 NON-COVERED ITEM OR SERVICE: HCPCS

## 2023-11-15 PROCEDURE — G0008 ADMIN INFLUENZA VIRUS VAC: HCPCS | Performed by: STUDENT IN AN ORGANIZED HEALTH CARE EDUCATION/TRAINING PROGRAM

## 2023-11-15 PROCEDURE — 250N000013 HC RX MED GY IP 250 OP 250 PS 637: Performed by: STUDENT IN AN ORGANIZED HEALTH CARE EDUCATION/TRAINING PROGRAM

## 2023-11-15 PROCEDURE — 124N000002 HC R&B MH UMMC

## 2023-11-15 PROCEDURE — 99232 SBSQ HOSP IP/OBS MODERATE 35: CPT | Mod: GC | Performed by: STUDENT IN AN ORGANIZED HEALTH CARE EDUCATION/TRAINING PROGRAM

## 2023-11-15 PROCEDURE — H2032 ACTIVITY THERAPY, PER 15 MIN: HCPCS

## 2023-11-15 PROCEDURE — 250N000011 HC RX IP 250 OP 636: Performed by: STUDENT IN AN ORGANIZED HEALTH CARE EDUCATION/TRAINING PROGRAM

## 2023-11-15 PROCEDURE — 250N000013 HC RX MED GY IP 250 OP 250 PS 637: Performed by: PHYSICIAN ASSISTANT

## 2023-11-15 PROCEDURE — 90686 IIV4 VACC NO PRSV 0.5 ML IM: CPT | Performed by: STUDENT IN AN ORGANIZED HEALTH CARE EDUCATION/TRAINING PROGRAM

## 2023-11-15 RX ORDER — FLUPHENAZINE HYDROCHLORIDE 2.5 MG/1
10 TABLET ORAL DAILY
Status: DISCONTINUED | OUTPATIENT
Start: 2023-11-15 | End: 2023-11-27

## 2023-11-15 RX ORDER — FLUPHENAZINE HYDROCHLORIDE 1 MG/1
2 TABLET ORAL 2 TIMES DAILY PRN
Status: DISCONTINUED | OUTPATIENT
Start: 2023-11-15 | End: 2023-11-16

## 2023-11-15 RX ADMIN — NICOTINE POLACRILEX 4 MG: 4 GUM, CHEWING BUCCAL at 11:19

## 2023-11-15 RX ADMIN — GABAPENTIN 1200 MG: 600 TABLET, FILM COATED ORAL at 13:23

## 2023-11-15 RX ADMIN — CLONAZEPAM 0.5 MG: 0.5 TABLET ORAL at 20:09

## 2023-11-15 RX ADMIN — DOCUSATE SODIUM 100 MG: 100 CAPSULE, LIQUID FILLED ORAL at 09:38

## 2023-11-15 RX ADMIN — CLONAZEPAM 1 MG: 1 TABLET ORAL at 09:38

## 2023-11-15 RX ADMIN — AMLODIPINE BESYLATE 10 MG: 5 TABLET ORAL at 09:37

## 2023-11-15 RX ADMIN — QUETIAPINE FUMARATE 200 MG: 200 TABLET ORAL at 21:03

## 2023-11-15 RX ADMIN — PROPRANOLOL HYDROCHLORIDE 10 MG: 10 TABLET ORAL at 19:11

## 2023-11-15 RX ADMIN — NICOTINE POLACRILEX 4 MG: 4 GUM, CHEWING BUCCAL at 18:25

## 2023-11-15 RX ADMIN — GABAPENTIN 1200 MG: 600 TABLET, FILM COATED ORAL at 09:38

## 2023-11-15 RX ADMIN — PROPRANOLOL HYDROCHLORIDE 10 MG: 10 TABLET ORAL at 09:38

## 2023-11-15 RX ADMIN — ACETAMINOPHEN 650 MG: 325 TABLET, FILM COATED ORAL at 05:07

## 2023-11-15 RX ADMIN — DOXYCYCLINE HYCLATE 100 MG: 100 CAPSULE ORAL at 19:11

## 2023-11-15 RX ADMIN — PROPRANOLOL HYDROCHLORIDE 10 MG: 10 TABLET ORAL at 13:23

## 2023-11-15 RX ADMIN — NICOTINE POLACRILEX 4 MG: 4 GUM, CHEWING BUCCAL at 14:06

## 2023-11-15 RX ADMIN — FLUPHENAZINE HYDROCHLORIDE 2 MG: 1 TABLET, FILM COATED ORAL at 19:41

## 2023-11-15 RX ADMIN — EMPAGLIFLOZIN 10 MG: 10 TABLET, FILM COATED ORAL at 09:38

## 2023-11-15 RX ADMIN — FLUPHENAZINE HYDROCHLORIDE 10 MG: 2.5 TABLET, FILM COATED ORAL at 10:49

## 2023-11-15 RX ADMIN — NICOTINE POLACRILEX 4 MG: 4 GUM, CHEWING BUCCAL at 09:41

## 2023-11-15 RX ADMIN — DOXYCYCLINE HYCLATE 100 MG: 100 CAPSULE ORAL at 09:38

## 2023-11-15 RX ADMIN — LITHIUM CARBONATE 900 MG: 450 TABLET, EXTENDED RELEASE ORAL at 20:09

## 2023-11-15 RX ADMIN — PANTOPRAZOLE SODIUM 40 MG: 40 TABLET, DELAYED RELEASE ORAL at 06:02

## 2023-11-15 RX ADMIN — NICOTINE 1 PATCH: 21 PATCH, EXTENDED RELEASE TRANSDERMAL at 09:37

## 2023-11-15 RX ADMIN — GABAPENTIN 1200 MG: 600 TABLET, FILM COATED ORAL at 19:11

## 2023-11-15 RX ADMIN — ACETAMINOPHEN 650 MG: 325 TABLET, FILM COATED ORAL at 20:35

## 2023-11-15 RX ADMIN — NICOTINE POLACRILEX 4 MG: 4 GUM, CHEWING BUCCAL at 20:35

## 2023-11-15 RX ADMIN — NICOTINE POLACRILEX 4 MG: 4 GUM, CHEWING BUCCAL at 12:44

## 2023-11-15 RX ADMIN — ROSUVASTATIN 40 MG: 20 TABLET, FILM COATED ORAL at 09:37

## 2023-11-15 RX ADMIN — DOCUSATE SODIUM 100 MG: 100 CAPSULE, LIQUID FILLED ORAL at 19:11

## 2023-11-15 RX ADMIN — TESTOSTERONE 50 MG OF TESTOSTERONE: 50 GEL TRANSDERMAL at 09:37

## 2023-11-15 RX ADMIN — POLYETHYLENE GLYCOL 3350 17 G: 17 POWDER, FOR SOLUTION ORAL at 09:37

## 2023-11-15 RX ADMIN — ACETAMINOPHEN 650 MG: 325 TABLET, FILM COATED ORAL at 12:13

## 2023-11-15 RX ADMIN — INSULIN GLARGINE 25 UNITS: 100 INJECTION, SOLUTION SUBCUTANEOUS at 09:36

## 2023-11-15 RX ADMIN — INFLUENZA A VIRUS A/VICTORIA/4897/2022 IVR-238 (H1N1) ANTIGEN (FORMALDEHYDE INACTIVATED), INFLUENZA A VIRUS A/DARWIN/9/2021 SAN-010 (H3N2) ANTIGEN (FORMALDEHYDE INACTIVATED), INFLUENZA B VIRUS B/PHUKET/3073/2013 ANTIGEN (FORMALDEHYDE INACTIVATED), AND INFLUENZA B VIRUS B/MICHIGAN/01/2021 ANTIGEN (FORMALDEHYDE INACTIVATED) 0.5 ML: 15; 15; 15; 15 INJECTION, SUSPENSION INTRAMUSCULAR at 09:39

## 2023-11-15 RX ADMIN — NICOTINE POLACRILEX 4 MG: 4 GUM, CHEWING BUCCAL at 15:57

## 2023-11-15 RX ADMIN — OLANZAPINE 5 MG: 5 TABLET, FILM COATED ORAL at 20:09

## 2023-11-15 RX ADMIN — NICOTINE POLACRILEX 4 MG: 4 GUM, CHEWING BUCCAL at 19:41

## 2023-11-15 ASSESSMENT — ACTIVITIES OF DAILY LIVING (ADL)
ADLS_ACUITY_SCORE: 42
HYGIENE/GROOMING: HANDWASHING;SHOWER;INDEPENDENT
ADLS_ACUITY_SCORE: 42
ORAL_HYGIENE: INDEPENDENT;PROMPTS
ADLS_ACUITY_SCORE: 42
LAUNDRY: WITH SUPERVISION
DRESS: STREET CLOTHES;INDEPENDENT
ADLS_ACUITY_SCORE: 42

## 2023-11-15 NOTE — PLAN OF CARE
Problem: Adult Inpatient Plan of Care  Goal: Optimal Comfort and Wellbeing  Intervention: Monitor Pain and Promote Comfort  Recent Flowsheet Documentation  Taken 11/15/2023 0505 by Dashawn Salvador RN  Pain Management Interventions: medication (see MAR)     Problem: Pain Acute  Goal: Optimal Pain Control and Function  Intervention: Develop Pain Management Plan  Recent Flowsheet Documentation  Taken 11/15/2023 0505 by Dashawn Salvador RN  Pain Management Interventions: medication (see MAR)     Problem: Sleep Disturbance  Goal: Adequate Sleep/Rest  Outcome: Progressing   Patient alert and oriented x 4. Patient complained of headache-Tylenol given and effective. Patient voiding well, went to the BR x 2- no urinary discomfort reported. Patient complained of R hand pain-cold pack applied- swelling on R hand noted. No behavioral issues or any safety concerns currently. Patient transfer independently in the room without any assistive device, on fall precaution. Patient endorsed AH- voices telling him to hurt others. Patient continues on SIO (1:1). Will continue to monitor the patient and provide therapeutic intervention as needed. Will continue with current plan of care. Notify MD with any concerns. Patient had 5.5 total hours of sleep this shift.

## 2023-11-15 NOTE — PLAN OF CARE
Goal Outcome Evaluation:     Problem: Adult Behavioral Health Plan of Care  Goal: Optimized Coping Skills in Response to Life Stressors  Outcome: Progressing  Goal: Develops/Participates in Therapeutic [place] to Support Successful Transition  Intervention: Foster Therapeutic [place]  Recent Flowsheet Documentation  Taken 11/15/2023 1100 by Paty Lovett RN  Trust Relationship/Rapport:   care explained   choices provided   questions answered   Patient slept in until 09:15,  presented with flat affect, mood calm and cooperative, Patient denies all mental health symptom except hallucinations. Blood sugar was 139 for breakfast and 105 for lunch, He did not eat breakfast, took all scheduled medications, had some adjustments in medication and reported the changes were effective. Prolixin rescheduled for the morning. Patient requested for PRN Tylenol for headache 5/10, Patient reported medication was effective. He attended group activity and stated he enjoyed dance therapy. Patient continue to endorsed auditory hallucinations, stated he is aware that voices are not real and is trying to use distractions most of this shift, continue on SIO for SIB and safety.

## 2023-11-15 NOTE — CARE PLAN
[facility] Occupational Therapy Group Intervention Note     11/15/23 1301   Group Therapy Session   Group Attendance attended group session   Time Session Began 1115   Time Session Ended 1200   Total Time (minutes) 45   Total # Attendees 6   Group Type task skill;life skill   Group Topic Covered coping skills/lifestyle management;relapse prevention;cognitive activities   Group Session Detail Clinic - coping skill exploration, creative expression within personally meaningful activities, and observation of social, cognitive, and kinesthetic performance skills.    Patient Response/Contribution cooperative with task;organized;discussed personal experience with topic   Patient Participation Detail Congruent affect. SIO present. Expressed desire to make personally meaningful bookmarks. IND to gather materials, organize work space, sequence task, and reach task completion. Demonstrated fair social engagement with staff. Expressed gratitude for opportunity to be creative and desire to start a new project next session.     Callie Benjamin OT on 11/15/2023 at 1:01 PM

## 2023-11-15 NOTE — CARE PLAN
11/15/23 0842   Individualization/Patient Specific Goals   Patient Personal Strengths community support;expressive of emotions;expressive of needs;family/social support;humor;interests/hobbies;positive attitude;resilient;resourceful;stable living environment;tolerant   Patient Vulnerabilities adverse childhood experience(s);history of unsuccessful treatment;substance abuse/addiction;traumatic event   Anxieties, Fears or Concerns None   Individualized Care Needs Currently on 1:1 due to SIB   Interprofessional Rounds   Summary 1. Stabilization of mood/thought disorder sx's 2. Safe with self 3. Medication mgmt per MD's 4. Coordination of care with outpatient providers 5.Housing addressed 6. Outpatient f/u care in place   Participants CTC;nursing;psychiatrist;patient  (med students)   Behavioral Team Discussion   Participants Dr. Willson, Dr. Nohemi John RN, Luz Lincoln MA.LP, Pharmacy, Med students   Progress Patient continues to report AH- voices telling her to harm others.  Patient has been agreeable to taper off benzo's- med adjustments.   Anticipated length of stay 10-14 days   Continued Stay Criteria/Rationale S/P overdose, medication mgmt   Medical/Physical urine retention   Precautions Per unit protocol- Current SIO   Plan Patient will be seen by Psychiatry daily.  Meds will be reviewed/adjusted per MD's. CM looking into alternate GH placement 2' poor care. Care will continue to be coordinated with outpatient providers. CTC willensure appropriate f/u care is in place   Rationale for change in precautions or plan No change in plan/precautions   Anticipated Discharge Disposition group home

## 2023-11-15 NOTE — CARE PLAN
BEH Occupational Therapy Brief Assessment Note     11/15/23 1042   General Information  Prakash has attended ~3 OT group interventions thus far. Overall, presents with a congruent affect and mild anxiety. Self-reports social anxiety as a barrier to frequent participation. Nevertheless, has demonstrated motivation and full participation while present in group.    Date Initially Attended OT 11/11/23   Clinical Impression   Affect Appropriate to situation   Orientation Oriented to person, place and time   Appearance and ADLs General cleanliness observed in most areas   Attention to Internal Stimuli No observed signs   Interaction Skills Interacts appropriately with staff;Interacts appropriately with peers   Ability to Communicate Needs Independent   Verbal Content Clear;Appropriate to topic   Ability to Maintain Boundaries Maintains appropriate physical boundaries;Maintains appropriate verbal boundaries   Participation Initiates participation;Independently participates   Concentration Concentrates 30+ minutes   Ability to Concentrate With structure   Follows and Comprehends Directions Independently follows 2 step verbal directions   Memory Delayed and immediate recall intact   Organization Independently organizes medium tasks;Needs occasional assistance    Decision Making Independent   Planning and Problem Solving Independently plans ahead   Ability to Apply and Learn Concepts Applies within group structure   Frustrations / Stress Tolerance Independently identifies sources of frustration/stress;Independently identifies skills ;Utilizes coping skills with assistance   Level of Insight Some insight   Self Esteem Can identify positives   Social Supports Has knowledge of support systems   BEH OT Care Plan Goals   BEH OT Goal 1 With min assist, Prakash will demonstrate illness management skills necessary for positive resumption of home and community roles by discharge.     Callie Benjamin OT on 11/15/2023 at 10:44 AM

## 2023-11-15 NOTE — PROGRESS NOTES
Pt demonstrated significant improvement over previous hospitalizations and dance/movement therapy (D/MT) sessions with this therapist.  He was more engaged, standing to dance and using high energy movement to organize through synchronous group movement.  Group cohesion supported pt leadership at an increasing level, eventually using creative self-expression and movement metaphors.  This allowed for movement and cognitive integration.  Pt presented with authentic self-expression and increased tolerance and inclusion of others.  He stated 1:1 psychotherapy with the unit therapist has been helpful.       11/15/23 1015   Expressive Therapy   Therapy Type dance/movement   Minutes of Treatment 50

## 2023-11-15 NOTE — PLAN OF CARE

## 2023-11-15 NOTE — PROGRESS NOTES
"    11/14/23 2000   Group Therapy Session   Time Session Began 2000   Time Session Ended 2050   Total Time (minutes) 20   Total # Attendees 3-5   Group Type expressive therapy   Group Topic Covered cognitive therapy techniques;balanced lifestyle   Group Session Detail Relaxation   Patient Response/Contribution cooperative with task   Patient Participation Detail Cooperatively engaged in Evening Music Relaxation group to decrease anxiety and promote sleep.  Calm affect, appropriately engaged in session, responding well to the music.  DBT skills used: Mindfulness \"What\" Skills (Observe, Participate)       Checked in stating he \"had a hard day, the second half\" but appeared regulated this evening.  Was in and out of session.  The music appeared to engage and uplift a bit while in attendance.      "

## 2023-11-15 NOTE — PLAN OF CARE
Patient immediate on shift start was observed in his appearing tense and reporting demanding command hallucinations to harm others, writer was able to encourage diversional activities and patient was receptive and immediately started coloring, he also requested prn's available however at the time of intervention patient had already maximized the available medications, patient was calm for few hours and engaged with staff appropriately, around 7:50 pm patient became agitated and started to banged the window with his fist clenched reporting that he was diverting the command hallucinations to harm others by self harming, patient was educated on the importance of seeking help and letting his SIO know of his emotional state and if urges are hard to control, and he was receptive, he however requested his scheduled HS medications and writer was able to give prn Prolixin 1 mg, scheduled Zyprexa 5 mg and his Lithium 900 mg, patient reported his right hand lateral ulnar side becoming increasingly swollen, immediately writer was able to have patient place a Ice pack on it and gave tylenol 650 mg prn for the pain, notified Dr. Luna who agreed to order an X-ray, patient was send down to Radiology, results came back negative, recommendations is to continue to ice the area and keep monitoring, patient was able to take the rest of his scheduled HS medications without prompts.   Problem: Adult Behavioral Health Plan of Care  Goal: Optimized Coping Skills in Response to Life Stressors  Outcome: Progressing  Flowsheets (Taken 11/14/2023 1950)  Optimized Coping Skills in Response to Life Stressors: making progress toward outcome     Problem: Anxiety  Goal: Anxiety Reduction or Resolution  Outcome: Progressing  Intervention: Promote Anxiety Reduction  Recent Flowsheet Documentation  Taken 11/14/2023 1722 by Jessica Tolentino, RN  Family/Support System Care:   support provided   self-care encouraged   involvement promoted     Problem:  Suicidal Behavior  Goal: Suicidal Behavior is Absent or Managed  Outcome: Progressing  Flowsheets (Taken 11/14/2023 1950)  Mutually Determined Action Steps (Suicidal Behavior Absent/Managed): shares suicidal thoughts   Goal Outcome Evaluation:    Plan of Care Reviewed With: patient
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
## 2023-11-15 NOTE — PROGRESS NOTES
Brief medicine note:  - Reviewed nursing notes since 11/13 and no recurrence of urinary retention. Appreciated pharmacy consult. No further medical intervention indicated. Medicine signing off. Please feel free to call with questions.      Yaya Jackson PA-C  Internal Medicine Hospitalist   [facility] Hospitalist group  [phone]

## 2023-11-16 LAB
GLUCOSE BLDC GLUCOMTR-MCNC: 147 MG/DL (ref 70–99)
GLUCOSE BLDC GLUCOMTR-MCNC: 173 MG/DL (ref 70–99)
GLUCOSE BLDC GLUCOMTR-MCNC: 221 MG/DL (ref 70–99)

## 2023-11-16 PROCEDURE — 250N000013 HC RX MED GY IP 250 OP 250 PS 637

## 2023-11-16 PROCEDURE — A9270 NON-COVERED ITEM OR SERVICE: HCPCS

## 2023-11-16 PROCEDURE — 250N000011 HC RX IP 250 OP 636

## 2023-11-16 PROCEDURE — 250N000013 HC RX MED GY IP 250 OP 250 PS 637: Performed by: PHYSICIAN ASSISTANT

## 2023-11-16 PROCEDURE — H2032 ACTIVITY THERAPY, PER 15 MIN: HCPCS

## 2023-11-16 PROCEDURE — G0177 OPPS/PHP; TRAIN & EDUC SERV: HCPCS

## 2023-11-16 PROCEDURE — 250N000013 HC RX MED GY IP 250 OP 250 PS 637: Performed by: PSYCHIATRY & NEUROLOGY

## 2023-11-16 PROCEDURE — 124N000002 HC R&B MH UMMC

## 2023-11-16 PROCEDURE — 99232 SBSQ HOSP IP/OBS MODERATE 35: CPT | Mod: GC | Performed by: PSYCHIATRY & NEUROLOGY

## 2023-11-16 PROCEDURE — 250N000013 HC RX MED GY IP 250 OP 250 PS 637: Performed by: STUDENT IN AN ORGANIZED HEALTH CARE EDUCATION/TRAINING PROGRAM

## 2023-11-16 RX ORDER — FLUPHENAZINE HYDROCHLORIDE 1 MG/1
3 TABLET ORAL 2 TIMES DAILY PRN
Status: DISCONTINUED | OUTPATIENT
Start: 2023-11-16 | End: 2023-11-27

## 2023-11-16 RX ORDER — CLONAZEPAM 0.5 MG/1
0.25 TABLET ORAL AT BEDTIME
Status: COMPLETED | OUTPATIENT
Start: 2023-11-16 | End: 2023-11-18

## 2023-11-16 RX ADMIN — TRETINOIN: 0.5 CREAM TOPICAL at 20:31

## 2023-11-16 RX ADMIN — AMLODIPINE BESYLATE 10 MG: 5 TABLET ORAL at 06:43

## 2023-11-16 RX ADMIN — EMPAGLIFLOZIN 10 MG: 10 TABLET, FILM COATED ORAL at 07:55

## 2023-11-16 RX ADMIN — GABAPENTIN 1200 MG: 600 TABLET, FILM COATED ORAL at 20:32

## 2023-11-16 RX ADMIN — GABAPENTIN 1200 MG: 600 TABLET, FILM COATED ORAL at 13:07

## 2023-11-16 RX ADMIN — NICOTINE POLACRILEX 4 MG: 4 GUM, CHEWING BUCCAL at 19:57

## 2023-11-16 RX ADMIN — LITHIUM CARBONATE 900 MG: 450 TABLET, EXTENDED RELEASE ORAL at 20:33

## 2023-11-16 RX ADMIN — POLYETHYLENE GLYCOL 3350 17 G: 17 POWDER, FOR SOLUTION ORAL at 07:56

## 2023-11-16 RX ADMIN — PANTOPRAZOLE SODIUM 40 MG: 40 TABLET, DELAYED RELEASE ORAL at 06:44

## 2023-11-16 RX ADMIN — DOCUSATE SODIUM 100 MG: 100 CAPSULE, LIQUID FILLED ORAL at 07:56

## 2023-11-16 RX ADMIN — NICOTINE POLACRILEX 4 MG: 4 GUM, CHEWING BUCCAL at 08:09

## 2023-11-16 RX ADMIN — ACETAMINOPHEN, CAFFEINE 2 TABLET: 500; 65 TABLET, FILM COATED ORAL at 16:20

## 2023-11-16 RX ADMIN — NICOTINE 1 PATCH: 21 PATCH, EXTENDED RELEASE TRANSDERMAL at 08:08

## 2023-11-16 RX ADMIN — NICOTINE POLACRILEX 4 MG: 4 GUM, CHEWING BUCCAL at 14:58

## 2023-11-16 RX ADMIN — INSULIN GLARGINE 25 UNITS: 100 INJECTION, SOLUTION SUBCUTANEOUS at 08:05

## 2023-11-16 RX ADMIN — NICOTINE POLACRILEX 4 MG: 4 GUM, CHEWING BUCCAL at 16:22

## 2023-11-16 RX ADMIN — PROPRANOLOL HYDROCHLORIDE 10 MG: 10 TABLET ORAL at 20:33

## 2023-11-16 RX ADMIN — NICOTINE POLACRILEX 4 MG: 4 GUM, CHEWING BUCCAL at 06:43

## 2023-11-16 RX ADMIN — ROSUVASTATIN 40 MG: 20 TABLET, FILM COATED ORAL at 07:56

## 2023-11-16 RX ADMIN — NICOTINE POLACRILEX 4 MG: 4 GUM, CHEWING BUCCAL at 11:58

## 2023-11-16 RX ADMIN — ONDANSETRON 4 MG: 4 TABLET ORAL at 07:14

## 2023-11-16 RX ADMIN — Medication 0.25 MG: at 20:34

## 2023-11-16 RX ADMIN — NICOTINE POLACRILEX 4 MG: 4 GUM, CHEWING BUCCAL at 18:21

## 2023-11-16 RX ADMIN — NICOTINE POLACRILEX 4 MG: 4 GUM, CHEWING BUCCAL at 09:55

## 2023-11-16 RX ADMIN — QUETIAPINE FUMARATE 200 MG: 200 TABLET ORAL at 20:34

## 2023-11-16 RX ADMIN — DOCUSATE SODIUM 100 MG: 100 CAPSULE, LIQUID FILLED ORAL at 20:33

## 2023-11-16 RX ADMIN — DOXYCYCLINE HYCLATE 100 MG: 100 CAPSULE ORAL at 07:56

## 2023-11-16 RX ADMIN — FLUPHENAZINE HYDROCHLORIDE 3 MG: 1 TABLET, FILM COATED ORAL at 18:21

## 2023-11-16 RX ADMIN — DOXYCYCLINE HYCLATE 100 MG: 100 CAPSULE ORAL at 20:34

## 2023-11-16 RX ADMIN — FLUPHENAZINE HYDROCHLORIDE 10 MG: 2.5 TABLET, FILM COATED ORAL at 07:55

## 2023-11-16 RX ADMIN — NICOTINE POLACRILEX 4 MG: 4 GUM, CHEWING BUCCAL at 21:10

## 2023-11-16 RX ADMIN — CLONAZEPAM 1 MG: 1 TABLET ORAL at 07:56

## 2023-11-16 RX ADMIN — GABAPENTIN 1200 MG: 600 TABLET, FILM COATED ORAL at 07:55

## 2023-11-16 RX ADMIN — PROPRANOLOL HYDROCHLORIDE 10 MG: 10 TABLET ORAL at 13:21

## 2023-11-16 RX ADMIN — TESTOSTERONE 50 MG OF TESTOSTERONE: 50 GEL TRANSDERMAL at 07:56

## 2023-11-16 RX ADMIN — PROPRANOLOL HYDROCHLORIDE 10 MG: 10 TABLET ORAL at 06:44

## 2023-11-16 RX ADMIN — NICOTINE POLACRILEX 4 MG: 4 GUM, CHEWING BUCCAL at 13:00

## 2023-11-16 RX ADMIN — ONDANSETRON 4 MG: 4 TABLET ORAL at 21:10

## 2023-11-16 ASSESSMENT — ACTIVITIES OF DAILY LIVING (ADL)
ADLS_ACUITY_SCORE: 42
DRESS: STREET CLOTHES;INDEPENDENT
LAUNDRY: WITH SUPERVISION
ADLS_ACUITY_SCORE: 42
ORAL_HYGIENE: INDEPENDENT
ADLS_ACUITY_SCORE: 42
ADLS_ACUITY_SCORE: 42
DRESS: STREET CLOTHES;INDEPENDENT
ADLS_ACUITY_SCORE: 42
HYGIENE/GROOMING: HANDWASHING;SHOWER;INDEPENDENT
ADLS_ACUITY_SCORE: 42
ORAL_HYGIENE: INDEPENDENT;PROMPTS
HYGIENE/GROOMING: HANDWASHING;INDEPENDENT
ADLS_ACUITY_SCORE: 42

## 2023-11-16 NOTE — PROGRESS NOTES
Behavioral Health  Note   Behavioral Health  Spirituality Group Note     Unit [address]    Name: Ayana Prasad    YOB: 1990   MRN: [id number]    Age: 33 year old     Patient attended -led group, which included discussion of spirituality, coping with illness and building resilience.   Patient attended group for [facility] - spirituality groups are not billed.   patient demonstrated an appreciation of topic's application for their personal circumstances.     [facility]  Staff    Page [phone]

## 2023-11-16 NOTE — PLAN OF CARE
Assessment/Intervention/Current Symtoms and Care Coordination:  Chart reviewed and patient met with team,   Discussed patient progress, symptomology, and response to treatment.  Discussed the discharge plan and any potential impediments to discharge.     Patient met with team.  Reports feeling s/w better today. Meds continue to be adjusted/monitored per MD's. Patient agreeable with recommended changes.     Patient spoke with CM and after discussing positive benefits of this particular GH in [place], patient is willing to go there.  CM to visit patient on unit tomorrow at 9:30am       Discharge Plan or Goal:  GH Placement  Psychiatry  Therapy  IOP     Barriers to Discharge:  Severity of symptoms  Need for continued med mgmt per MD's.  Patient is on a benzo taper as well.  PLacement needed     Referral Status:  IOP intake completed 11/10/23     Legal Status:  Committed- PD revoked 10/31/23     [facility]: [place]  File Number:  [id number]  Start and expiration date of commitment:   6/9/23 - 12/9/23?     Contacts:  Outpatient Psychiatrist: Regine Last CNP  Psychiatry  Primary Physician: KELIN Edwards  [facility] : Cristy Ji [phone]  CADI CM:Pretty Guzman,:[phone]  Family Members: Negra Prasad ([phone])     Upcoming Meetings and Dates/Important Information and next steps:  Patient will need PD when discharged.     Team Update:   Wed

## 2023-11-16 NOTE — CARE PLAN
[facility] Occupational Therapy Group Intervention Note     11/16/23 1437   Group Therapy Session   Group Attendance attended group session   Time Session Began 1115   Time Session Ended 1200   Total Time (minutes) 45   Total # Attendees 5   Group Type task skill;life skill   Group Topic Covered relapse prevention;coping skills/lifestyle management;cognitive activities   Group Session Detail Clinic - coping skill exploration, creative expression within personally meaningful activities, and observation of social, cognitive, and kinesthetic performance skills   Patient Response/Contribution cooperative with task;organized;discussed personal experience with topic;listened actively   Patient Participation Detail Congruent affect. SIO present. Accepted min A to gather materials, organize work space, sequence task, and reach task completion. Motivated to ask for help as needed. Demonstrated fair social engagement with staff, minimally with peers.      Callie Benjamin OT on 11/16/2023 at 2:38 PM

## 2023-11-16 NOTE — PLAN OF CARE
"Goal Outcome Evaluation:    Plan of Care Reviewed With: patient Plan of Care Reviewed With: patient    Overall Patient Progress: improvingOverall Patient Progress: improving         Problem: Adult Inpatient Plan of Care  Goal: Plan of Care Review  Description: The Plan of Care Review/Shift note should be completed every shift.  The Outcome Evaluation is a brief statement about your assessment that the patient is improving, declining, or no change.  This information will be displayed automatically on your shift  note.  Outcome: Progressing  Flowsheets (Taken 11/15/2023 1852)  Plan of Care Reviewed With: patient  Overall Patient Progress: improving     Pt was presented with pain 8/10 to right upper wrist, slightly swollen and reddened. Pt stated, \"I punched the window last night because the voices were telling me to hurt other people.\" Pt was offered pain medication and ice pack but pt declined. However, pt later requested ice pack and PRN Tylenol. Endorses anxiety 6/10 which he state, manageable with coping skills, without medicine. Later on pt requested Fluphenazine. Pt also been utilized PRN nicotine gum hourly. Pt refused bed time Tretinoin cream. Pt was seen intermittently in the [facility] way and his room reading the book. Mood is anxious and seeking for staff and medications frequently. Flat and blunted affect. Pt denies SI/HI. Pt is sena for safety. No behavior escalation or safety concerns noted with this shift. No medications side effects reported or observed. Blood pressure 138/89, pulse 95, temperature 97.7  F (36.5  C), temperature source Oral, resp. rate 16, height 1.702 m (5' 7\"), weight 106.9 kg (235 lb 9.6 oz), SpO2 96%, unknown if currently breastfeeding.    "
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
## 2023-11-16 NOTE — PLAN OF CARE
"Goal Outcome Evaluation:    Plan of Care Reviewed With: patient Plan of Care Reviewed With: patient    Overall Patient Progress: improvingOverall Patient Progress: improving         Problem: Adult Inpatient Plan of Care  Goal: Plan of Care Review  Description: The Plan of Care Review/Shift note should be completed every shift.  The Outcome Evaluation is a brief statement about your assessment that the patient is improving, declining, or no change.  This information will be displayed automatically on your shift  note.  Outcome: Progressing  Flowsheets (Taken 11/16/2023 3629)  Plan of Care Reviewed With: patient  Overall Patient Progress: improving     Pt was presented with \"mild\" anxiety. Pt been utilized PRN nicotine gum hourly. Pt was seen intermittently in the lounge, song way and his room reading the book. Mood is calm and a bit bright upon approach. Continue seeking for staff and medications frequently. Flat and blunted affect. Pt denies SI/HI. Pt continue on SIO 1:1 for SIB unable to contract for safety due to constant urges for SIB. No behavior escalation or safety concerns noted with this shift. No medications side effects reported or observed. Towards the end of the shift, Pt endorses \"caffeine headache\" that \"nothing helps\", unless he get caffeine or Excedrin. Provider was paged. Provider is going to put an order for Excedrin. BG this morning 173, lunch time 147. Blood pressure 126/81, pulse 100, temperature 97.5  F (36.4  C), temperature source Oral, resp. rate 18, height 1.702 m (5' 7\"), weight 107.3 kg (236 lb 8 oz), SpO2 95%, unknown if currently breastfeeding.      Discharge Plan or Goal:  [facility] Placement  Psychiatry  Therapy  IOP     Barriers to Discharge:  Severity of symptoms  Need for continued med mgmt per MD's.  Patient is on a benzo taper as well.  PLacement needed    "

## 2023-11-16 NOTE — PLAN OF CARE

## 2023-11-16 NOTE — PLAN OF CARE
"  Problem: Sleep Disturbance  Goal: Adequate Sleep/Rest  Outcome: Progressing  Patient complained of headache, declined to take Tylenol when offered. VS Blood pressure (!) 150/96, pulse 91, temperature 97.4  F (36.3  C), temperature source Oral, resp. rate 16, height 1.702 m (5' 7\"), weight 106.9 kg (235 lb 9.6 oz), SpO2 96%, on RA. Scheduled BP medication given early this morning. No complains of chest pain, SOB, dizziness, lightheadedness. Patient continues on SIO (1:1). Will continue to monitor the patient and provide therapeutic intervention as needed. Will continue with current plan of care. Notify MD with any concerns. Patient had 6.75 total hours of sleep this shift.       0716: Patient complained of nausea-Zofran given. Patient had BM yesterday , voiding well, and no urinary discomfort per his report.  "

## 2023-11-17 LAB
ALBUMIN SERPL BCG-MCNC: 4.5 G/DL (ref 3.5–5.2)
ALP SERPL-CCNC: 92 U/L (ref 40–150)
ALT SERPL W P-5'-P-CCNC: 55 U/L (ref 0–70)
ANION GAP SERPL CALCULATED.3IONS-SCNC: 9 MMOL/L (ref 7–15)
AST SERPL W P-5'-P-CCNC: 46 U/L (ref 0–45)
BASOPHILS # BLD AUTO: 0.1 10E3/UL (ref 0–0.2)
BASOPHILS NFR BLD AUTO: 1 %
BILIRUB SERPL-MCNC: 0.3 MG/DL
BUN SERPL-MCNC: 18.5 MG/DL (ref 6–20)
CALCIUM SERPL-MCNC: 9.8 MG/DL (ref 8.6–10)
CHLORIDE SERPL-SCNC: 103 MMOL/L (ref 98–107)
CREAT SERPL-MCNC: 0.6 MG/DL (ref 0.51–1.17)
DEPRECATED HCO3 PLAS-SCNC: 25 MMOL/L (ref 22–29)
EGFRCR SERPLBLD CKD-EPI 2021: >90 ML/MIN/1.73M2
EOSINOPHIL # BLD AUTO: 0.3 10E3/UL (ref 0–0.7)
EOSINOPHIL NFR BLD AUTO: 4 %
ERYTHROCYTE [DISTWIDTH] IN BLOOD BY AUTOMATED COUNT: 14.6 % (ref 10–15)
GLUCOSE BLDC GLUCOMTR-MCNC: 171 MG/DL (ref 70–99)
GLUCOSE BLDC GLUCOMTR-MCNC: 192 MG/DL (ref 70–99)
GLUCOSE BLDC GLUCOMTR-MCNC: 96 MG/DL (ref 70–99)
GLUCOSE SERPL-MCNC: 180 MG/DL (ref 70–99)
HCT VFR BLD AUTO: 44.4 % (ref 35–53)
HGB BLD-MCNC: 14.7 G/DL (ref 11.7–17.7)
IMM GRANULOCYTES # BLD: 0.1 10E3/UL
IMM GRANULOCYTES NFR BLD: 1 %
LYMPHOCYTES # BLD AUTO: 3.1 10E3/UL (ref 0.8–5.3)
LYMPHOCYTES NFR BLD AUTO: 33 %
MCH RBC QN AUTO: 28.7 PG (ref 26.5–33)
MCHC RBC AUTO-ENTMCNC: 33.1 G/DL (ref 31.5–36.5)
MCV RBC AUTO: 87 FL (ref 78–100)
MONOCYTES # BLD AUTO: 0.6 10E3/UL (ref 0–1.3)
MONOCYTES NFR BLD AUTO: 7 %
NEUTROPHILS # BLD AUTO: 5.2 10E3/UL (ref 1.6–8.3)
NEUTROPHILS NFR BLD AUTO: 54 %
NRBC # BLD AUTO: 0 10E3/UL
NRBC BLD AUTO-RTO: 0 /100
PLATELET # BLD AUTO: 312 10E3/UL (ref 150–450)
POTASSIUM SERPL-SCNC: 4.3 MMOL/L (ref 3.4–5.3)
PROT SERPL-MCNC: 7.4 G/DL (ref 6.4–8.3)
RBC # BLD AUTO: 5.13 10E6/UL (ref 3.8–5.9)
SODIUM SERPL-SCNC: 137 MMOL/L (ref 135–145)
WBC # BLD AUTO: 9.4 10E3/UL (ref 4–11)

## 2023-11-17 PROCEDURE — 93010 ELECTROCARDIOGRAM REPORT: CPT | Performed by: INTERNAL MEDICINE

## 2023-11-17 PROCEDURE — 250N000013 HC RX MED GY IP 250 OP 250 PS 637

## 2023-11-17 PROCEDURE — 36415 COLL VENOUS BLD VENIPUNCTURE: CPT

## 2023-11-17 PROCEDURE — 124N000002 HC R&B MH UMMC

## 2023-11-17 PROCEDURE — 250N000013 HC RX MED GY IP 250 OP 250 PS 637: Performed by: STUDENT IN AN ORGANIZED HEALTH CARE EDUCATION/TRAINING PROGRAM

## 2023-11-17 PROCEDURE — 250N000013 HC RX MED GY IP 250 OP 250 PS 637: Performed by: PHYSICIAN ASSISTANT

## 2023-11-17 PROCEDURE — A9270 NON-COVERED ITEM OR SERVICE: HCPCS

## 2023-11-17 PROCEDURE — 250N000013 HC RX MED GY IP 250 OP 250 PS 637: Performed by: PSYCHIATRY & NEUROLOGY

## 2023-11-17 PROCEDURE — 85004 AUTOMATED DIFF WBC COUNT: CPT

## 2023-11-17 PROCEDURE — 80053 COMPREHEN METABOLIC PANEL: CPT

## 2023-11-17 PROCEDURE — 99232 SBSQ HOSP IP/OBS MODERATE 35: CPT | Mod: GC | Performed by: PSYCHIATRY & NEUROLOGY

## 2023-11-17 PROCEDURE — 93005 ELECTROCARDIOGRAM TRACING: CPT

## 2023-11-17 PROCEDURE — G0177 OPPS/PHP; TRAIN & EDUC SERV: HCPCS

## 2023-11-17 PROCEDURE — 90834 PSYTX W PT 45 MINUTES: CPT

## 2023-11-17 RX ORDER — ATOMOXETINE 18 MG/1
18 CAPSULE ORAL DAILY
Status: DISCONTINUED | OUTPATIENT
Start: 2023-11-17 | End: 2023-11-20

## 2023-11-17 RX ADMIN — PROPRANOLOL HYDROCHLORIDE 10 MG: 10 TABLET ORAL at 21:17

## 2023-11-17 RX ADMIN — ACETAMINOPHEN, CAFFEINE 2 TABLET: 500; 65 TABLET, FILM COATED ORAL at 18:07

## 2023-11-17 RX ADMIN — GABAPENTIN 1200 MG: 600 TABLET, FILM COATED ORAL at 21:17

## 2023-11-17 RX ADMIN — NICOTINE POLACRILEX 4 MG: 4 GUM, CHEWING BUCCAL at 18:30

## 2023-11-17 RX ADMIN — POLYETHYLENE GLYCOL 3350 17 G: 17 POWDER, FOR SOLUTION ORAL at 08:03

## 2023-11-17 RX ADMIN — DOCUSATE SODIUM 100 MG: 100 CAPSULE, LIQUID FILLED ORAL at 08:01

## 2023-11-17 RX ADMIN — PANTOPRAZOLE SODIUM 40 MG: 40 TABLET, DELAYED RELEASE ORAL at 08:01

## 2023-11-17 RX ADMIN — QUETIAPINE FUMARATE 200 MG: 200 TABLET ORAL at 21:16

## 2023-11-17 RX ADMIN — AMLODIPINE BESYLATE 10 MG: 5 TABLET ORAL at 07:59

## 2023-11-17 RX ADMIN — PROPRANOLOL HYDROCHLORIDE 10 MG: 10 TABLET ORAL at 14:01

## 2023-11-17 RX ADMIN — DOCUSATE SODIUM 100 MG: 100 CAPSULE, LIQUID FILLED ORAL at 21:17

## 2023-11-17 RX ADMIN — PROPRANOLOL HYDROCHLORIDE 10 MG: 10 TABLET ORAL at 18:30

## 2023-11-17 RX ADMIN — CAMPHOR AND MENTHOL: 5; 5 LOTION TOPICAL at 22:42

## 2023-11-17 RX ADMIN — ROSUVASTATIN 40 MG: 20 TABLET, FILM COATED ORAL at 08:01

## 2023-11-17 RX ADMIN — NICOTINE POLACRILEX 4 MG: 4 GUM, CHEWING BUCCAL at 17:18

## 2023-11-17 RX ADMIN — NICOTINE 1 PATCH: 21 PATCH, EXTENDED RELEASE TRANSDERMAL at 08:14

## 2023-11-17 RX ADMIN — NICOTINE POLACRILEX 4 MG: 4 GUM, CHEWING BUCCAL at 09:36

## 2023-11-17 RX ADMIN — CLONAZEPAM 1 MG: 1 TABLET ORAL at 08:01

## 2023-11-17 RX ADMIN — NICOTINE POLACRILEX 4 MG: 4 GUM, CHEWING BUCCAL at 08:02

## 2023-11-17 RX ADMIN — FLUPHENAZINE HYDROCHLORIDE 3 MG: 1 TABLET, FILM COATED ORAL at 17:25

## 2023-11-17 RX ADMIN — TESTOSTERONE 50 MG OF TESTOSTERONE: 50 GEL TRANSDERMAL at 08:03

## 2023-11-17 RX ADMIN — TRETINOIN: 0.5 CREAM TOPICAL at 21:22

## 2023-11-17 RX ADMIN — NICOTINE POLACRILEX 4 MG: 4 GUM, CHEWING BUCCAL at 10:56

## 2023-11-17 RX ADMIN — GABAPENTIN 1200 MG: 600 TABLET, FILM COATED ORAL at 07:59

## 2023-11-17 RX ADMIN — GABAPENTIN 1200 MG: 600 TABLET, FILM COATED ORAL at 14:01

## 2023-11-17 RX ADMIN — INSULIN GLARGINE 25 UNITS: 100 INJECTION, SOLUTION SUBCUTANEOUS at 07:56

## 2023-11-17 RX ADMIN — DOXYCYCLINE HYCLATE 100 MG: 100 CAPSULE ORAL at 21:17

## 2023-11-17 RX ADMIN — ACETAMINOPHEN, CAFFEINE 2 TABLET: 500; 65 TABLET, FILM COATED ORAL at 04:39

## 2023-11-17 RX ADMIN — DOXYCYCLINE HYCLATE 100 MG: 100 CAPSULE ORAL at 08:00

## 2023-11-17 RX ADMIN — EMPAGLIFLOZIN 10 MG: 10 TABLET, FILM COATED ORAL at 08:01

## 2023-11-17 RX ADMIN — Medication 0.25 MG: at 21:17

## 2023-11-17 RX ADMIN — ATOMOXETINE 18 MG: 18 CAPSULE ORAL at 12:05

## 2023-11-17 RX ADMIN — NICOTINE POLACRILEX 4 MG: 4 GUM, CHEWING BUCCAL at 14:02

## 2023-11-17 RX ADMIN — ACETAMINOPHEN, CAFFEINE 2 TABLET: 500; 65 TABLET, FILM COATED ORAL at 11:21

## 2023-11-17 RX ADMIN — PROPRANOLOL HYDROCHLORIDE 10 MG: 10 TABLET ORAL at 08:00

## 2023-11-17 RX ADMIN — FLUPHENAZINE HYDROCHLORIDE 10 MG: 2.5 TABLET, FILM COATED ORAL at 07:59

## 2023-11-17 RX ADMIN — LITHIUM CARBONATE 900 MG: 450 TABLET, EXTENDED RELEASE ORAL at 21:17

## 2023-11-17 RX ADMIN — NICOTINE POLACRILEX 4 MG: 4 GUM, CHEWING BUCCAL at 21:21

## 2023-11-17 ASSESSMENT — ACTIVITIES OF DAILY LIVING (ADL)
ADLS_ACUITY_SCORE: 42
ADLS_ACUITY_SCORE: 42
DRESS: STREET CLOTHES;INDEPENDENT
ADLS_ACUITY_SCORE: 42
ADLS_ACUITY_SCORE: 42
HYGIENE/GROOMING: HANDWASHING;SHOWER;INDEPENDENT
LAUNDRY: WITH SUPERVISION
LAUNDRY: WITH SUPERVISION
ADLS_ACUITY_SCORE: 42
HYGIENE/GROOMING: INDEPENDENT
ORAL_HYGIENE: INDEPENDENT
ADLS_ACUITY_SCORE: 42
DRESS: INDEPENDENT
ADLS_ACUITY_SCORE: 42
ADLS_ACUITY_SCORE: 42
ORAL_HYGIENE: INDEPENDENT;PROMPTS
ADLS_ACUITY_SCORE: 42

## 2023-11-17 NOTE — PLAN OF CARE
Assessment/Intervention/Current Symtoms and Care Coordination:  Chart reviewed and patient met with team,   Discussed patient progress, symptomology, and response to treatment.  Discussed the discharge plan and any potential impediments to discharge.     Patient met with team.  Affect bright today.  Reports feeling s/w better today. Patient continues to feel he needs 1:1 staffing for thought of self harm.  Meds continue to be adjusted/monitored per MD's. Patient agreeable with recommended changes.    Writer met with patient and CM.  CM is going ahead with referral for GH- she will continue to coordinate w/ [facility] and myself.    Discharge Plan or Goal:  GH Placement  Psychiatry  Therapy  IOP     Barriers to Discharge:  Severity of symptoms  Need for continued med mgmt per MD's.  Patient is on a benzo taper as well.  PLacement needed     Referral Status:  IOP intake completed 11/10/23     Legal Status:  Committed- PD revoked 10/31/23     [facility]: [place]  File Number:  [id number]  Start and expiration date of commitment:   6/9/23 - 12/9/23?     Contacts:  Outpatient Psychiatrist: Regine Last [facility]  Primary Physician: KELIN Edwards  [facility] : Cristy Ji [phone]  [facility] CM:Pretty Guzman,:[phone]  Family Members: Negra Prasad ([phone])     Upcoming Meetings and Dates/Important Information and next steps:  Patient has ACT team intake 11/29/23 at 3:00pm  Patient will need PD when discharged.     Team Update:   Wed

## 2023-11-17 NOTE — PLAN OF CARE
Problem: Sleep Disturbance  Goal: Adequate Sleep/Rest  Outcome: Progressing   Goal Outcome Evaluation:  Patient appears to have slept for 6 hours. No acute events/issues during the night. Requested and received prn Excedrin for tension headache x1, patient asleep during follow-up assessments. Continues on SIO 1:1 for suicide risk.

## 2023-11-17 NOTE — PLAN OF CARE
"Goal Outcome Evaluation:    Plan of Care Reviewed With: patient Plan of Care Reviewed With: patient    Overall Patient Progress: improvingOverall Patient Progress: improving           Problem: Adult Inpatient Plan of Care  Goal: Plan of Care Review  Description: The Plan of Care Review/Shift note should be completed every shift.  The Outcome Evaluation is a brief statement about your assessment that the patient is improving, declining, or no change.  This information will be displayed automatically on your shift  note.  11/17/2023 1118 by Aylin Pastor RN  Outcome: Progressing  Flowsheets (Taken 11/17/2023 1118)  Plan of Care Reviewed With: patient  Overall Patient Progress: improving     Pt BG this morning before breakfast 171 and BG 96. Had labs this morning and EKG. Pt c/o headache 8/10. PRN Excedrin was given. Pt has new order for Strattera 18 mg daily. Pt started the first dose this afternoon. Clonazepam was decreased to 0.25 mg from 0.5 mg. Pt was endorsed \"mild\" anxiety. Stated, \"I am happy today.\" Pt been utilized PRN nicotine gum hourly. Pt was seen intermittently in the [facility], [address] and his room reading the book. Mood is calm and a bit bright upon approach. Continue seeking for staff and medications frequently. Flat and blunted affect. Pt denies SI/HI. Pt continue on SIO 1:1 for SIB unable to contract for safety due to constant urges for SIB. No behavior escalation or safety concerns noted with this shift. No medications side effects reported or observed. Blood pressure 124/76, pulse 95, temperature 97.6  F (36.4  C), temperature source Oral, resp. rate 16, height 1.702 m (5' 7\"), weight 107.3 kg (236 lb 8 oz), SpO2 96%, unknown if currently breastfeeding.    Discharge Plan or Goal:  GH Placement  Psychiatry  Therapy  IOP     Barriers to Discharge:  Severity of symptoms  Need for continued med mgmt per MD's.  Patient is on a benzo taper as well.  PLacement needed  "

## 2023-11-17 NOTE — PLAN OF CARE

## 2023-11-17 NOTE — PLAN OF CARE
Problem: Suicidal Behavior  Goal: Suicidal Behavior is Absent or Managed  Outcome: Progressing     Problem: Pain Acute  Goal: Optimal Pain Control and Function  Intervention: Develop Pain Management Plan  Recent Flowsheet Documentation  Taken 11/16/2023 1600 by Zafar Ramirez, RN  Pain Management Interventions: medication (see MAR)     Problem: Pain Acute  Goal: Optimal Pain Control and Function  Outcome: Progressing  Intervention: Develop Pain Management Plan  Recent Flowsheet Documentation  Taken 11/16/2023 1600 by Zafar Ramirez, RN  Pain Management Interventions: medication (see MAR)     Problem: Anxiety  Goal: Anxiety Reduction or Resolution  Outcome: Progressing     Problem: Adult Behavioral Health Plan of Care  Goal: Absence of New-Onset Illness or Injury  Intervention: Identify and Manage Fall Risk  Recent Flowsheet Documentation  Taken 11/16/2023 1826 by Zafar Ramirez, RN  Safety Measures:   environmental rounds completed   safety rounds completed   Goal Outcome Evaluation:    Plan of Care Reviewed With: patient        Headache rated at 8/10, pain managed with Excedrin. Patient endorsed commend hallucination, no SIB noted/reported but SIB thoughts only. 1:1 SIO continues. Anxiety rated at 10/10, PRN Prolixin given, medication effective. Patient reported no depression. ADLs WNL, adequate PO intake. Presented as  calm, pleasant and cooperative. Medication compliant. PRN Zofran given at HS, medication effective.
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
## 2023-11-18 LAB
GLUCOSE BLDC GLUCOMTR-MCNC: 130 MG/DL (ref 70–99)
GLUCOSE BLDC GLUCOMTR-MCNC: 209 MG/DL (ref 70–99)
GLUCOSE BLDC GLUCOMTR-MCNC: 248 MG/DL (ref 70–99)

## 2023-11-18 PROCEDURE — 250N000013 HC RX MED GY IP 250 OP 250 PS 637

## 2023-11-18 PROCEDURE — A9270 NON-COVERED ITEM OR SERVICE: HCPCS

## 2023-11-18 PROCEDURE — 250N000013 HC RX MED GY IP 250 OP 250 PS 637: Performed by: PHYSICIAN ASSISTANT

## 2023-11-18 PROCEDURE — 250N000013 HC RX MED GY IP 250 OP 250 PS 637: Performed by: PSYCHIATRY & NEUROLOGY

## 2023-11-18 PROCEDURE — 250N000013 HC RX MED GY IP 250 OP 250 PS 637: Performed by: STUDENT IN AN ORGANIZED HEALTH CARE EDUCATION/TRAINING PROGRAM

## 2023-11-18 PROCEDURE — 124N000002 HC R&B MH UMMC

## 2023-11-18 RX ADMIN — GABAPENTIN 1200 MG: 600 TABLET, FILM COATED ORAL at 20:44

## 2023-11-18 RX ADMIN — NICOTINE POLACRILEX 4 MG: 4 GUM, CHEWING BUCCAL at 07:58

## 2023-11-18 RX ADMIN — NICOTINE 1 PATCH: 21 PATCH, EXTENDED RELEASE TRANSDERMAL at 07:58

## 2023-11-18 RX ADMIN — NICOTINE POLACRILEX 4 MG: 4 GUM, CHEWING BUCCAL at 16:52

## 2023-11-18 RX ADMIN — Medication 0.25 MG: at 20:45

## 2023-11-18 RX ADMIN — ROSUVASTATIN 40 MG: 20 TABLET, FILM COATED ORAL at 07:59

## 2023-11-18 RX ADMIN — FLUPHENAZINE HYDROCHLORIDE 10 MG: 2.5 TABLET, FILM COATED ORAL at 07:59

## 2023-11-18 RX ADMIN — CLONAZEPAM 1 MG: 1 TABLET ORAL at 07:58

## 2023-11-18 RX ADMIN — NICOTINE POLACRILEX 4 MG: 4 GUM, CHEWING BUCCAL at 12:29

## 2023-11-18 RX ADMIN — ACETAMINOPHEN, CAFFEINE 2 TABLET: 500; 65 TABLET, FILM COATED ORAL at 06:05

## 2023-11-18 RX ADMIN — CAMPHOR AND MENTHOL: 5; 5 LOTION TOPICAL at 17:23

## 2023-11-18 RX ADMIN — ACETAMINOPHEN, CAFFEINE 2 TABLET: 500; 65 TABLET, FILM COATED ORAL at 21:27

## 2023-11-18 RX ADMIN — ATOMOXETINE 18 MG: 18 CAPSULE ORAL at 08:01

## 2023-11-18 RX ADMIN — TRETINOIN: 0.5 CREAM TOPICAL at 20:49

## 2023-11-18 RX ADMIN — DOXYCYCLINE HYCLATE 100 MG: 100 CAPSULE ORAL at 20:45

## 2023-11-18 RX ADMIN — LITHIUM CARBONATE 900 MG: 450 TABLET, EXTENDED RELEASE ORAL at 20:45

## 2023-11-18 RX ADMIN — PANTOPRAZOLE SODIUM 40 MG: 40 TABLET, DELAYED RELEASE ORAL at 08:00

## 2023-11-18 RX ADMIN — EMPAGLIFLOZIN 10 MG: 10 TABLET, FILM COATED ORAL at 08:00

## 2023-11-18 RX ADMIN — NICOTINE POLACRILEX 4 MG: 4 GUM, CHEWING BUCCAL at 14:14

## 2023-11-18 RX ADMIN — ACETAMINOPHEN, CAFFEINE 2 TABLET: 500; 65 TABLET, FILM COATED ORAL at 12:35

## 2023-11-18 RX ADMIN — FLUPHENAZINE HYDROCHLORIDE 3 MG: 1 TABLET, FILM COATED ORAL at 17:30

## 2023-11-18 RX ADMIN — PROPRANOLOL HYDROCHLORIDE 10 MG: 10 TABLET ORAL at 13:01

## 2023-11-18 RX ADMIN — FLUPHENAZINE HYDROCHLORIDE 3 MG: 1 TABLET, FILM COATED ORAL at 12:59

## 2023-11-18 RX ADMIN — PROPRANOLOL HYDROCHLORIDE 10 MG: 10 TABLET ORAL at 08:00

## 2023-11-18 RX ADMIN — NICOTINE POLACRILEX 4 MG: 4 GUM, CHEWING BUCCAL at 06:02

## 2023-11-18 RX ADMIN — POLYETHYLENE GLYCOL 3350 17 G: 17 POWDER, FOR SOLUTION ORAL at 07:58

## 2023-11-18 RX ADMIN — DOCUSATE SODIUM 100 MG: 100 CAPSULE, LIQUID FILLED ORAL at 20:45

## 2023-11-18 RX ADMIN — TESTOSTERONE 50 MG OF TESTOSTERONE: 50 GEL TRANSDERMAL at 07:58

## 2023-11-18 RX ADMIN — INSULIN GLARGINE 25 UNITS: 100 INJECTION, SOLUTION SUBCUTANEOUS at 08:01

## 2023-11-18 RX ADMIN — DOXYCYCLINE HYCLATE 100 MG: 100 CAPSULE ORAL at 08:00

## 2023-11-18 RX ADMIN — DOCUSATE SODIUM 100 MG: 100 CAPSULE, LIQUID FILLED ORAL at 08:00

## 2023-11-18 RX ADMIN — NICOTINE POLACRILEX 4 MG: 4 GUM, CHEWING BUCCAL at 08:48

## 2023-11-18 RX ADMIN — NICOTINE POLACRILEX 4 MG: 4 GUM, CHEWING BUCCAL at 21:27

## 2023-11-18 RX ADMIN — NICOTINE POLACRILEX 4 MG: 4 GUM, CHEWING BUCCAL at 10:23

## 2023-11-18 RX ADMIN — PROPRANOLOL HYDROCHLORIDE 10 MG: 10 TABLET ORAL at 20:44

## 2023-11-18 RX ADMIN — AMLODIPINE BESYLATE 10 MG: 5 TABLET ORAL at 07:59

## 2023-11-18 RX ADMIN — GABAPENTIN 1200 MG: 600 TABLET, FILM COATED ORAL at 13:00

## 2023-11-18 RX ADMIN — QUETIAPINE FUMARATE 200 MG: 200 TABLET ORAL at 20:45

## 2023-11-18 RX ADMIN — NICOTINE POLACRILEX 4 MG: 4 GUM, CHEWING BUCCAL at 15:50

## 2023-11-18 RX ADMIN — GABAPENTIN 1200 MG: 600 TABLET, FILM COATED ORAL at 08:00

## 2023-11-18 ASSESSMENT — ACTIVITIES OF DAILY LIVING (ADL)
ADLS_ACUITY_SCORE: 42
DRESS: INDEPENDENT
ADLS_ACUITY_SCORE: 42
ADLS_ACUITY_SCORE: 42
LAUNDRY: WITH SUPERVISION
ADLS_ACUITY_SCORE: 42
ADLS_ACUITY_SCORE: 42
HYGIENE/GROOMING: INDEPENDENT
ADLS_ACUITY_SCORE: 42
ORAL_HYGIENE: INDEPENDENT
ADLS_ACUITY_SCORE: 42

## 2023-11-18 NOTE — PLAN OF CARE
"Pt has been mostly isolative in his room. Pt was listening to headphones and socializing with [facility] staff. Presents with flat affect. Pt reports \"today has been a good day\". Endorses anxiety 4/10 and prn prolixin was administered. Pt c/o headache and took prn Excedrin for it and was effective. Endorses auditory hallucinations but pt says the voices are not loud this evening. Denied for depression and SI/SIB thoughts. Pt c/o itchiness on left ankle and order was obtained for Sarna lotion and was applied. Will monitor.     Goal Outcome Evaluation:  Problem: Anxiety  Goal: Anxiety Reduction or Resolution  Outcome: Progressing     Problem: Psychotic Signs/Symptoms  Goal: Optimal Cognitive Function (Psychotic Signs/Symptoms)  Outcome: Progressing     Problem: Pain Acute  Goal: Optimal Pain Control and Function  Outcome: Progressing     Problem: Suicidal Behavior  Goal: Suicidal Behavior is Absent or Managed  Outcome: Progressing                          "

## 2023-11-18 NOTE — PLAN OF CARE
Problem: Adult Behavioral Health Plan of Care  Goal: Optimized Coping Skills in Response to Life Stressors  Outcome: Progressing  Intervention: Promote Effective Coping Strategies  Recent Flowsheet Documentation  Taken 11/18/2023 1100 by Paty Lovett RN  Supportive Measures:   active listening utilized   self-care encouraged   self-reflection promoted   self-responsibility promoted   verbalization of feelings encouraged   Goal Outcome Evaluation:  Patient alert and oriented x 4, up and visible in [place], patient socially interactive with staffs and peers, he denied anxiety, depression, SI and SIB, patient endorsed auditory hallucination and stated unit is overly stimulating, patient has been able to self redirect using headphone and coloring as distractions, he is also requesting for Nicotine gum frequently. By 12:30, patient reported increase headache and PRN Excedrin was given, medication was minimally effective. Patient reported commanding auditory hallucinations with intent to hit or hurt self, writer was able to help with redirections, patient using music and PRN Prolixin given with schedule gabapentin and Propranolol, patient reported that interventions were effective and no further safety concern noted this shift. Patient intake is adequate, blood sugar was 248 for breakfast and 130 for lunch, continue SIO for SIB.

## 2023-11-18 NOTE — CARE PLAN
11/17/23 2200   Group Therapy Session   Group Attendance attended group session   Time Session Began 2000   Time Session Ended 2100   Total Time (minutes) 60   Total # Attendees 3-4   Group Type task skill   Group Topic Covered emotions/expression;problem-solving   Patient Response/Contribution cooperative with task;organized   Patient Participation Detail See Note     Pt actively participated in occupational therapy clinic to facilitate coping skill exploration, creative expression within personally meaningful activities, and clinical observation of social, cognitive, and kinesthetic performance skills. Pt response: Independent to initiate, gather materials, sequence, and adjust to workspace demands as needed. Demonstrated good focus, planning, and problem solving for selected scratch art then word find task. Able to ask for assistance as needed, and appropriately social with peers and staff. Pt will continue to be encouraged to attend groups for further asssesssment and to address goals identified on plan of care.

## 2023-11-18 NOTE — PLAN OF CARE
Problem: Sleep Disturbance  Goal: Adequate Sleep/Rest  Outcome: Progressing     Pt appears to have slept for 6.5 hours. PRN hydroxyzine was given for anxiety. PRN medication administration was effective, as there was no further complain of anxiety. Pt did not complain of pain or discomfort. Pt is on SIO, 1:1 for  suicide risk. Staff will continue to offer support to pt.

## 2023-11-19 LAB
GLUCOSE BLDC GLUCOMTR-MCNC: 155 MG/DL (ref 70–99)
GLUCOSE BLDC GLUCOMTR-MCNC: 190 MG/DL (ref 70–99)
GLUCOSE BLDC GLUCOMTR-MCNC: 210 MG/DL (ref 70–99)

## 2023-11-19 PROCEDURE — 250N000011 HC RX IP 250 OP 636

## 2023-11-19 PROCEDURE — 250N000013 HC RX MED GY IP 250 OP 250 PS 637: Performed by: PHYSICIAN ASSISTANT

## 2023-11-19 PROCEDURE — 250N000013 HC RX MED GY IP 250 OP 250 PS 637

## 2023-11-19 PROCEDURE — A9270 NON-COVERED ITEM OR SERVICE: HCPCS

## 2023-11-19 PROCEDURE — 250N000012 HC RX MED GY IP 250 OP 636 PS 637

## 2023-11-19 PROCEDURE — 250N000013 HC RX MED GY IP 250 OP 250 PS 637: Performed by: PSYCHIATRY & NEUROLOGY

## 2023-11-19 PROCEDURE — 250N000013 HC RX MED GY IP 250 OP 250 PS 637: Performed by: STUDENT IN AN ORGANIZED HEALTH CARE EDUCATION/TRAINING PROGRAM

## 2023-11-19 PROCEDURE — 124N000002 HC R&B MH UMMC

## 2023-11-19 RX ADMIN — NICOTINE POLACRILEX 4 MG: 4 GUM, CHEWING BUCCAL at 20:25

## 2023-11-19 RX ADMIN — ROSUVASTATIN 40 MG: 20 TABLET, FILM COATED ORAL at 08:47

## 2023-11-19 RX ADMIN — DOXYCYCLINE HYCLATE 100 MG: 100 CAPSULE ORAL at 08:47

## 2023-11-19 RX ADMIN — POLYETHYLENE GLYCOL 3350 17 G: 17 POWDER, FOR SOLUTION ORAL at 08:47

## 2023-11-19 RX ADMIN — NICOTINE POLACRILEX 4 MG: 4 GUM, CHEWING BUCCAL at 19:36

## 2023-11-19 RX ADMIN — PROPRANOLOL HYDROCHLORIDE 10 MG: 10 TABLET ORAL at 08:47

## 2023-11-19 RX ADMIN — GABAPENTIN 1200 MG: 600 TABLET, FILM COATED ORAL at 13:02

## 2023-11-19 RX ADMIN — NICOTINE POLACRILEX 4 MG: 4 GUM, CHEWING BUCCAL at 17:32

## 2023-11-19 RX ADMIN — DOCUSATE SODIUM 100 MG: 100 CAPSULE, LIQUID FILLED ORAL at 08:47

## 2023-11-19 RX ADMIN — TESTOSTERONE 50 MG OF TESTOSTERONE: 50 GEL TRANSDERMAL at 08:46

## 2023-11-19 RX ADMIN — INSULIN GLARGINE 25 UNITS: 100 INJECTION, SOLUTION SUBCUTANEOUS at 08:49

## 2023-11-19 RX ADMIN — ATOMOXETINE 18 MG: 18 CAPSULE ORAL at 08:46

## 2023-11-19 RX ADMIN — DOXYCYCLINE HYCLATE 100 MG: 100 CAPSULE ORAL at 19:50

## 2023-11-19 RX ADMIN — PROPRANOLOL HYDROCHLORIDE 10 MG: 10 TABLET ORAL at 19:45

## 2023-11-19 RX ADMIN — PROPRANOLOL HYDROCHLORIDE 10 MG: 10 TABLET ORAL at 13:02

## 2023-11-19 RX ADMIN — EMPAGLIFLOZIN 10 MG: 10 TABLET, FILM COATED ORAL at 08:46

## 2023-11-19 RX ADMIN — NICOTINE POLACRILEX 4 MG: 4 GUM, CHEWING BUCCAL at 06:07

## 2023-11-19 RX ADMIN — PANTOPRAZOLE SODIUM 40 MG: 40 TABLET, DELAYED RELEASE ORAL at 08:47

## 2023-11-19 RX ADMIN — ACETAMINOPHEN, CAFFEINE 2 TABLET: 500; 65 TABLET, FILM COATED ORAL at 06:07

## 2023-11-19 RX ADMIN — FLUPHENAZINE HYDROCHLORIDE 10 MG: 2.5 TABLET, FILM COATED ORAL at 08:45

## 2023-11-19 RX ADMIN — CLONAZEPAM 1 MG: 1 TABLET ORAL at 08:47

## 2023-11-19 RX ADMIN — LITHIUM CARBONATE 900 MG: 450 TABLET, EXTENDED RELEASE ORAL at 20:18

## 2023-11-19 RX ADMIN — NICOTINE 1 PATCH: 21 PATCH, EXTENDED RELEASE TRANSDERMAL at 08:46

## 2023-11-19 RX ADMIN — ACETAMINOPHEN, CAFFEINE 2 TABLET: 500; 65 TABLET, FILM COATED ORAL at 12:05

## 2023-11-19 RX ADMIN — NICOTINE POLACRILEX 4 MG: 4 GUM, CHEWING BUCCAL at 13:02

## 2023-11-19 RX ADMIN — NICOTINE POLACRILEX 4 MG: 4 GUM, CHEWING BUCCAL at 11:41

## 2023-11-19 RX ADMIN — FLUPHENAZINE HYDROCHLORIDE 3 MG: 1 TABLET, FILM COATED ORAL at 17:29

## 2023-11-19 RX ADMIN — QUETIAPINE FUMARATE 200 MG: 200 TABLET ORAL at 19:45

## 2023-11-19 RX ADMIN — GABAPENTIN 1200 MG: 600 TABLET, FILM COATED ORAL at 19:45

## 2023-11-19 RX ADMIN — GABAPENTIN 1200 MG: 600 TABLET, FILM COATED ORAL at 08:46

## 2023-11-19 RX ADMIN — Medication 3 MG: at 22:28

## 2023-11-19 RX ADMIN — NICOTINE POLACRILEX 4 MG: 4 GUM, CHEWING BUCCAL at 16:29

## 2023-11-19 RX ADMIN — NICOTINE POLACRILEX 4 MG: 4 GUM, CHEWING BUCCAL at 08:47

## 2023-11-19 RX ADMIN — NICOTINE POLACRILEX 4 MG: 4 GUM, CHEWING BUCCAL at 10:13

## 2023-11-19 RX ADMIN — AMLODIPINE BESYLATE 10 MG: 5 TABLET ORAL at 08:46

## 2023-11-19 RX ADMIN — DOCUSATE SODIUM 100 MG: 100 CAPSULE, LIQUID FILLED ORAL at 19:50

## 2023-11-19 RX ADMIN — NICOTINE POLACRILEX 4 MG: 4 GUM, CHEWING BUCCAL at 21:28

## 2023-11-19 RX ADMIN — ONDANSETRON 4 MG: 4 TABLET ORAL at 09:52

## 2023-11-19 ASSESSMENT — ACTIVITIES OF DAILY LIVING (ADL)
DRESS: INDEPENDENT
LAUNDRY: WITH SUPERVISION
HYGIENE/GROOMING: INDEPENDENT
ADLS_ACUITY_SCORE: 42
ORAL_HYGIENE: INDEPENDENT
ADLS_ACUITY_SCORE: 42

## 2023-11-19 NOTE — PLAN OF CARE
Pt is alert and oriented x4. Pt is withdrawn and not socializing with peers but engages with SIO staff. Pt participated in community meeting this evening. Presents with flat affect. Endorsed anxiety and took prn Prolixin for it. Endorsed hearing voices but reported they were not loud. Pt sat outside his room most of the time which he reports helps to cope with the voices. Pt took a nap before supper. Pt ate supper adequately when he woke up. Endorsed SIB thoughts but is able to redirect self from SIB. Denied suicidal ideations. Pt was compliant with medications. Pt had a dead skin from a healed blister that was hanging on his left foot and another nurse helped cut it off.     Goal Outcome Evaluation:  Problem: Anxiety  Goal: Anxiety Reduction or Resolution  Outcome: Progressing     Problem: Suicidal Behavior  Goal: Suicidal Behavior is Absent or Managed  Outcome: Progressing

## 2023-11-19 NOTE — PLAN OF CARE
Problem: Adult Behavioral Health Plan of Care  Goal: Optimized Coping Skills in Response to Life Stressors  Intervention: Promote Effective Coping Strategies  Recent Flowsheet Documentation  Taken 11/19/2023 1200 by Paty Lovett RN  Supportive Measures:   active listening utilized   self-care encouraged   self-reflection promoted   self-responsibility promoted   verbalization of feelings encouraged   Goal Outcome Evaluation:  Patient isolative to room most of this shift, continue to endorse auditory hallucination, is using positive coping skills and did not want to take PRN anti anxiety med, patient took Nicotine gum almost every hour, he requested for Excedrin for onset of headache, requesting to have regular coffee for lunch. Provider was updated and order modified for patient to have coffee with lunch tray and 4 hours between Excedrin. Patient was receptive with current plan of care, visible in song chatting with SIO staffs, he does not interact with Peers. Has adequate intake and hygiene is clean and well groom. SIO staff in place for SIB, patient continue to use coping skills with increase onset of auditory hallucination, no SIB noted this shift.

## 2023-11-19 NOTE — PLAN OF CARE
Problem: Sleep Disturbance  Goal: Adequate Sleep/Rest  Outcome: Progressing    Pt appears to have slept for 6.5 hours. PRN Excedrin  was given for headache. PRN medication administration was effective, as there was no further complain of headache during shift. Pt denies SI/SIB.  Pt is on SIO, 1:1 for  suicide risk. Staff will continue to offer support to pt.

## 2023-11-20 LAB
GLUCOSE BLDC GLUCOMTR-MCNC: 144 MG/DL (ref 70–99)
GLUCOSE BLDC GLUCOMTR-MCNC: 176 MG/DL (ref 70–99)
GLUCOSE BLDC GLUCOMTR-MCNC: 205 MG/DL (ref 70–99)

## 2023-11-20 PROCEDURE — 124N000002 HC R&B MH UMMC

## 2023-11-20 PROCEDURE — 250N000013 HC RX MED GY IP 250 OP 250 PS 637: Performed by: STUDENT IN AN ORGANIZED HEALTH CARE EDUCATION/TRAINING PROGRAM

## 2023-11-20 PROCEDURE — 250N000013 HC RX MED GY IP 250 OP 250 PS 637

## 2023-11-20 PROCEDURE — A9270 NON-COVERED ITEM OR SERVICE: HCPCS

## 2023-11-20 PROCEDURE — 250N000013 HC RX MED GY IP 250 OP 250 PS 637: Performed by: PHYSICIAN ASSISTANT

## 2023-11-20 PROCEDURE — 250N000013 HC RX MED GY IP 250 OP 250 PS 637: Performed by: PSYCHIATRY & NEUROLOGY

## 2023-11-20 PROCEDURE — 99232 SBSQ HOSP IP/OBS MODERATE 35: CPT | Mod: GC | Performed by: STUDENT IN AN ORGANIZED HEALTH CARE EDUCATION/TRAINING PROGRAM

## 2023-11-20 PROCEDURE — 90834 PSYTX W PT 45 MINUTES: CPT

## 2023-11-20 PROCEDURE — G0177 OPPS/PHP; TRAIN & EDUC SERV: HCPCS

## 2023-11-20 RX ORDER — SULINDAC 200 MG/1
200 TABLET ORAL 2 TIMES DAILY PRN
Status: DISCONTINUED | OUTPATIENT
Start: 2023-11-20 | End: 2023-11-29 | Stop reason: HOSPADM

## 2023-11-20 RX ORDER — TRETINOIN 0.5 MG/G
CREAM TOPICAL
Status: DISCONTINUED | OUTPATIENT
Start: 2023-11-20 | End: 2023-11-29 | Stop reason: HOSPADM

## 2023-11-20 RX ORDER — ATOMOXETINE 25 MG/1
25 CAPSULE ORAL DAILY
Status: DISCONTINUED | OUTPATIENT
Start: 2023-11-21 | End: 2023-11-27

## 2023-11-20 RX ORDER — ACETAMINOPHEN 325 MG/1
650 TABLET ORAL EVERY 4 HOURS PRN
Status: DISCONTINUED | OUTPATIENT
Start: 2023-11-20 | End: 2023-11-29 | Stop reason: HOSPADM

## 2023-11-20 RX ORDER — IBUPROFEN 600 MG/1
600 TABLET, FILM COATED ORAL EVERY 6 HOURS PRN
Status: DISCONTINUED | OUTPATIENT
Start: 2023-11-20 | End: 2023-11-20

## 2023-11-20 RX ADMIN — FLUPHENAZINE HYDROCHLORIDE 3 MG: 1 TABLET, FILM COATED ORAL at 18:50

## 2023-11-20 RX ADMIN — GABAPENTIN 1200 MG: 600 TABLET, FILM COATED ORAL at 07:50

## 2023-11-20 RX ADMIN — NICOTINE POLACRILEX 4 MG: 4 GUM, CHEWING BUCCAL at 07:40

## 2023-11-20 RX ADMIN — NICOTINE POLACRILEX 4 MG: 4 GUM, CHEWING BUCCAL at 21:26

## 2023-11-20 RX ADMIN — ATOMOXETINE 18 MG: 18 CAPSULE ORAL at 07:49

## 2023-11-20 RX ADMIN — QUETIAPINE FUMARATE 200 MG: 200 TABLET ORAL at 20:47

## 2023-11-20 RX ADMIN — LITHIUM CARBONATE 900 MG: 450 TABLET, EXTENDED RELEASE ORAL at 20:55

## 2023-11-20 RX ADMIN — PROPRANOLOL HYDROCHLORIDE 10 MG: 10 TABLET ORAL at 20:51

## 2023-11-20 RX ADMIN — ROSUVASTATIN 40 MG: 20 TABLET, FILM COATED ORAL at 07:49

## 2023-11-20 RX ADMIN — NICOTINE POLACRILEX 4 MG: 4 GUM, CHEWING BUCCAL at 20:32

## 2023-11-20 RX ADMIN — NICOTINE POLACRILEX 4 MG: 4 GUM, CHEWING BUCCAL at 19:22

## 2023-11-20 RX ADMIN — ACETAMINOPHEN 650 MG: 325 TABLET, FILM COATED ORAL at 22:25

## 2023-11-20 RX ADMIN — DOXYCYCLINE HYCLATE 100 MG: 100 CAPSULE ORAL at 07:49

## 2023-11-20 RX ADMIN — DOXYCYCLINE HYCLATE 100 MG: 100 CAPSULE ORAL at 19:29

## 2023-11-20 RX ADMIN — CLONAZEPAM 1 MG: 1 TABLET ORAL at 07:49

## 2023-11-20 RX ADMIN — EMPAGLIFLOZIN 10 MG: 10 TABLET, FILM COATED ORAL at 07:50

## 2023-11-20 RX ADMIN — AMLODIPINE BESYLATE 10 MG: 5 TABLET ORAL at 07:50

## 2023-11-20 RX ADMIN — GABAPENTIN 1200 MG: 600 TABLET, FILM COATED ORAL at 13:37

## 2023-11-20 RX ADMIN — GABAPENTIN 1200 MG: 600 TABLET, FILM COATED ORAL at 19:30

## 2023-11-20 RX ADMIN — NICOTINE POLACRILEX 4 MG: 4 GUM, CHEWING BUCCAL at 08:34

## 2023-11-20 RX ADMIN — INSULIN GLARGINE 25 UNITS: 100 INJECTION, SOLUTION SUBCUTANEOUS at 08:10

## 2023-11-20 RX ADMIN — NICOTINE POLACRILEX 4 MG: 4 GUM, CHEWING BUCCAL at 18:16

## 2023-11-20 RX ADMIN — NICOTINE POLACRILEX 4 MG: 4 GUM, CHEWING BUCCAL at 17:25

## 2023-11-20 RX ADMIN — NICOTINE 1 PATCH: 21 PATCH, EXTENDED RELEASE TRANSDERMAL at 07:49

## 2023-11-20 RX ADMIN — SULINDAC 200 MG: 200 TABLET ORAL at 19:58

## 2023-11-20 RX ADMIN — PROPRANOLOL HYDROCHLORIDE 10 MG: 10 TABLET ORAL at 07:50

## 2023-11-20 RX ADMIN — ACETAMINOPHEN 650 MG: 325 TABLET, FILM COATED ORAL at 11:15

## 2023-11-20 RX ADMIN — POLYETHYLENE GLYCOL 3350 17 G: 17 POWDER, FOR SOLUTION ORAL at 07:50

## 2023-11-20 RX ADMIN — FLUPHENAZINE HYDROCHLORIDE 10 MG: 2.5 TABLET, FILM COATED ORAL at 09:38

## 2023-11-20 RX ADMIN — DOCUSATE SODIUM 100 MG: 100 CAPSULE, LIQUID FILLED ORAL at 19:29

## 2023-11-20 RX ADMIN — TESTOSTERONE 50 MG OF TESTOSTERONE: 50 GEL TRANSDERMAL at 07:50

## 2023-11-20 RX ADMIN — NICOTINE POLACRILEX 4 MG: 4 GUM, CHEWING BUCCAL at 10:57

## 2023-11-20 RX ADMIN — NICOTINE POLACRILEX 4 MG: 4 GUM, CHEWING BUCCAL at 12:33

## 2023-11-20 RX ADMIN — PANTOPRAZOLE SODIUM 40 MG: 40 TABLET, DELAYED RELEASE ORAL at 07:50

## 2023-11-20 RX ADMIN — ACETAMINOPHEN, CAFFEINE 2 TABLET: 500; 65 TABLET, FILM COATED ORAL at 08:46

## 2023-11-20 RX ADMIN — PROPRANOLOL HYDROCHLORIDE 10 MG: 10 TABLET ORAL at 13:37

## 2023-11-20 RX ADMIN — PROPRANOLOL HYDROCHLORIDE 10 MG: 10 TABLET ORAL at 19:29

## 2023-11-20 RX ADMIN — ACETAMINOPHEN 650 MG: 325 TABLET, FILM COATED ORAL at 18:35

## 2023-11-20 RX ADMIN — DOCUSATE SODIUM 100 MG: 100 CAPSULE, LIQUID FILLED ORAL at 07:50

## 2023-11-20 ASSESSMENT — ACTIVITIES OF DAILY LIVING (ADL)
ORAL_HYGIENE: INDEPENDENT
ADLS_ACUITY_SCORE: 42
ADLS_ACUITY_SCORE: 42
HYGIENE/GROOMING: INDEPENDENT
ADLS_ACUITY_SCORE: 42
ADLS_ACUITY_SCORE: 42
LAUNDRY: WITH SUPERVISION
ADLS_ACUITY_SCORE: 42
ORAL_HYGIENE: INDEPENDENT
ADLS_ACUITY_SCORE: 42
LAUNDRY: WITH SUPERVISION
DRESS: INDEPENDENT
HYGIENE/GROOMING: INDEPENDENT
ADLS_ACUITY_SCORE: 42
DRESS: INDEPENDENT

## 2023-11-20 NOTE — PLAN OF CARE

## 2023-11-20 NOTE — PLAN OF CARE
Problem: Sleep Disturbance  Goal: Adequate Sleep/Rest  Outcome: Progressing    Pt appears to have slept for 6.75 hours. Pt did not complain of pain or discomfort, and no PRN medications were given.  Pt is on SIO, 1:1 for  suicide risk. Staff will continue to offer support to pt.

## 2023-11-20 NOTE — CARE PLAN
"[facility] Occupational Therapy Group Intervention Note     11/20/23 1501   Group Therapy Session   Group Attendance attended group session   Time Session Began 1315   Time Session Ended 1400   Total Time (minutes) 45   Total # Attendees 3   Group Type psychoeducation   Group Topic Covered self-care activities;relaxation techniques;coping skills/lifestyle management   Group Session Detail Chair Yoga for occupation-based coping skill exploration group through mindful movement to facilitate stress management, awakening and/or relaxation. Education was provided on the use of stretching, exercise, and meditation practice as a coping tool within daily/weekly routine.    Patient Response/Contribution cooperative with task;discussed personal experience with topic;organized;listened actively   Patient Participation Detail Independently followed and engaged in all of group. Verbalized feeling \"relaxed and ready for a nap\" at the end of group and interest in additional mind-body interventions.      Callie Benjamin OT on 11/20/2023 at 3:01 PM    "
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
## 2023-11-20 NOTE — CARE PLAN
[facility] Occupational Therapy Group Intervention Note     11/20/23 1351   Group Therapy Session   Group Attendance attended group session   Time Session Began 1015   Time Session Ended 1100   Total Time (minutes) 45   Total # Attendees 45   Group Type task skill;life skill   Group Topic Covered coping skills/lifestyle management;relapse prevention;cognitive activities   Group Session Detail Clinic - coping skill exploration, creative expression within personally meaningful activities, and observation of social, cognitive, and kinesthetic performance skills.   Patient Response/Contribution cooperative with task;organized;discussed personal experience with topic;listened actively   Patient Participation Detail Congruent affect. SIO present. Opted to initiate a novel task, demonstrating readiness to step into unfamiliar task. Required min A to gather materials, organize work space, sequence complex unfamiliar steps, and reach task completion. Demonstrated fair social engagement with nearby peers.      Callie Benjamin OT on 11/20/2023 at 1:52 PM

## 2023-11-20 NOTE — PLAN OF CARE
Assessment/Intervention/Current Symtoms and Care Coordination:  Chart reviewed and patient met with team,   Discussed patient progress, symptomology, and response to treatment.  Discussed the discharge plan and any potential impediments to discharge.     Patient met with team.  Reported having bad headache today. Continues to report anxiety.  Meds continue to be monitored and adjusted closely.  Patient's [facility] CM provided me contact info for [place] [facility] owner.  I did speak to her and provided her contact info for patient's [facility] CM to discuss rates.  Patient was informed that this will likely take some time to get everything approved.    Writer will continue to coordinate care with CM, [facility] and [facility].     Discharge Plan or Goal:  [facility] Placement  Psychiatry  Therapy  IOP     Barriers to Discharge:  Severity of symptoms  Need for continued med mgmt per MD's.   PLacement needed     Referral Status:  IOP intake completed 11/10/23     Legal Status:  Committed- PD revoked 10/31/23     [facility]: [facility]  File Number:  [id number]  Start and expiration date of commitment:   6/9/23 - 12/9/23?     Contacts:  Outpatient Psychiatrist: Regine Last [facility]  Primary Physician: KELIN Edwards  [facility] : Cristy Garrison [phone]  [facility] CM:Pretty Guzman,:[phone]  Family Members: Negra Prasad ([phone])     Upcoming Meetings and Dates/Important Information and next steps:  Patient has ACT team intake 11/29/23 at 3:00pm  Patient will need PD when discharged.     Team Update:   Wed

## 2023-11-20 NOTE — PLAN OF CARE
"  Duration: Met with patient for a total of 40 minutes.    Time start: 1335  Time end 1415    Modality Used:CBT, Motivational Interviewing, and Brief Therapy    Goals: Continue to work on controlling impulses and asserting boundaries.    Pt progress: Focus of the session was on Pt's outburst this morning from frustration with another patient and lack of sleep. Pt reports he \"dealt with it the wrong way\" and \"made a mistake\" but punching a door. Pt reports he usually punches more than once, feels like only one punch is slight improvement. Writer provided positive reinforcement on progress and insight into resulting shame/regret. Discussed alternate ways Pt could have handled situation. Pt reports he wishes he could verbalize anger before becoming physical, but that it is hard with command hallucinations.     Pt reports he is slowing coming out of his shell on unit despite social anxiety d/t body image issues stemming from gender transition. Writer validated his feelings and encouraged him to continue to socialize and be in community with others as he feels safe.    Discussed relationship with past counselor Ashlyn. Pt does not desire to report counselor as he feels she will not have relationships with clients again. Writer encouraged Pt to think of the safety of other clients, but without more information about Ashlyn (last name, which group home she worked at) writer does not have enough information to report at this time.     Treatment Objective(s) Addressed:   The focus of this session was on anger management, and building self-esteem     Progress Towards Goals and Assessment of Patient:   Patient reports improving symptoms. Patient is making progress towards treatment goals as evidenced by improved insight and formulating alternatives to anger outbursts.     Therapeutic Intervention(s):   Provided active listening, unconditional positive regard, and validation. Provided positive reinforcement for progress towards " goals, gains in knowledge, and application of skills previously taught.  Explored strategies for self-soothing.     Plan/next step: Made plan to check in with Pt on Wednesday.      Lydia Salazar [facility]  Psychotherapist

## 2023-11-20 NOTE — PLAN OF CARE
Pt SIO 1:1 discontinued today as pt was able to contract for safety and assure that he would reach out to staff if he was having trouble with SIB thoughts. Pt is visible in the milieu, he is selectively social with peers and attends all groups offered. He is med compliant without issue, received PRN excedrin for headache and later received PRN tylenol for c/o hand pain. He was initially irritable this morning, hit a door in the hallway d/t being upset that another peer woke him up; pt initially told writer that he did not want his morning meds and to leave him alone but then requested for his meds quickly after. He was calm for the remainder of the shift with no further behavioral escalation. 0800 BG of 176, 1200 BG of 205. Pt continues to endorse AH that tell him to hurt others; no signs of violence or urges to follow through with these thoughts. He endorses anxiety/depression, denies SI/SIB currently.    Problem: Psychotic Signs/Symptoms  Goal: Improved Mood Symptoms (Psychotic Signs/Symptoms)  Outcome: Progressing   Goal Outcome Evaluation:    Plan of Care Reviewed With: patient

## 2023-11-20 NOTE — PLAN OF CARE
Pt has been mostly isolative in his room this shift. Pt has been listening to music on his headphones. Pt presents with flat affect. Pt reports auditory hallucinations has been loud commanding to SIB. Pt was able to redirect by engaging with staff, playing card and listening to music. Endorses anxiety and was given prn Prolixin. Pt was disappointed to find out the HS klonopin last dose was yesterday. Pt did not use the tretinoin ointment saying he thinks it is making his skin dry and peeling off. Pt encouraged to discuss with doctor in the morning. Appetite is good ate 100% of dinner.       Goal Outcome Evaluation:  Problem: Adult Behavioral Health Plan of Care  Goal: Adheres to Safety Considerations for Self and Others  Outcome: Progressing  Intervention: Develop and Maintain Individualized Safety Plan  Recent Flowsheet Documentation  Taken 11/19/2023 1900 by Ryan Slaughter RN  Safety Measures: 1:1 observation maintained     Problem: Anxiety  Goal: Anxiety Reduction or Resolution  Outcome: Progressing     Problem: Pain Acute  Goal: Optimal Pain Control and Function  Outcome: Progressing     Problem: Suicidal Behavior  Goal: Suicidal Behavior is Absent or Managed  Outcome: Progressing

## 2023-11-21 ENCOUNTER — APPOINTMENT (OUTPATIENT)
Dept: GENERAL RADIOLOGY | Facility: CLINIC | Age: 33
End: 2023-11-21
Payer: MEDICARE

## 2023-11-21 LAB
ATRIAL RATE - MUSE: 82 BPM
DIASTOLIC BLOOD PRESSURE - MUSE: NORMAL MMHG
GLUCOSE BLDC GLUCOMTR-MCNC: 136 MG/DL (ref 70–99)
GLUCOSE BLDC GLUCOMTR-MCNC: 178 MG/DL (ref 70–99)
GLUCOSE BLDC GLUCOMTR-MCNC: 254 MG/DL (ref 70–99)
INTERPRETATION ECG - MUSE: NORMAL
P AXIS - MUSE: 37 DEGREES
PR INTERVAL - MUSE: 174 MS
QRS DURATION - MUSE: 100 MS
QT - MUSE: 386 MS
QTC - MUSE: 450 MS
R AXIS - MUSE: 20 DEGREES
SYSTOLIC BLOOD PRESSURE - MUSE: NORMAL MMHG
T AXIS - MUSE: 24 DEGREES
VENTRICULAR RATE- MUSE: 82 BPM

## 2023-11-21 PROCEDURE — 99222 1ST HOSP IP/OBS MODERATE 55: CPT

## 2023-11-21 PROCEDURE — 73130 X-RAY EXAM OF HAND: CPT | Mod: 50

## 2023-11-21 PROCEDURE — 250N000013 HC RX MED GY IP 250 OP 250 PS 637

## 2023-11-21 PROCEDURE — 90853 GROUP PSYCHOTHERAPY: CPT

## 2023-11-21 PROCEDURE — L3807 WHFO W/O JOINTS PRE CST: HCPCS

## 2023-11-21 PROCEDURE — A9270 NON-COVERED ITEM OR SERVICE: HCPCS

## 2023-11-21 PROCEDURE — 124N000002 HC R&B MH UMMC

## 2023-11-21 PROCEDURE — 99232 SBSQ HOSP IP/OBS MODERATE 35: CPT | Mod: GC | Performed by: PSYCHIATRY & NEUROLOGY

## 2023-11-21 PROCEDURE — 250N000011 HC RX IP 250 OP 636

## 2023-11-21 PROCEDURE — 250N000013 HC RX MED GY IP 250 OP 250 PS 637: Performed by: STUDENT IN AN ORGANIZED HEALTH CARE EDUCATION/TRAINING PROGRAM

## 2023-11-21 PROCEDURE — 250N000013 HC RX MED GY IP 250 OP 250 PS 637: Performed by: PHYSICIAN ASSISTANT

## 2023-11-21 RX ORDER — OXYCODONE HYDROCHLORIDE 5 MG/1
5 TABLET ORAL
Status: COMPLETED | OUTPATIENT
Start: 2023-11-21 | End: 2023-11-21

## 2023-11-21 RX ORDER — NALOXONE HYDROCHLORIDE 0.4 MG/ML
0.4 INJECTION, SOLUTION INTRAMUSCULAR; INTRAVENOUS; SUBCUTANEOUS
Status: DISCONTINUED | OUTPATIENT
Start: 2023-11-21 | End: 2023-11-29 | Stop reason: HOSPADM

## 2023-11-21 RX ORDER — NALOXONE HYDROCHLORIDE 0.4 MG/ML
0.2 INJECTION, SOLUTION INTRAMUSCULAR; INTRAVENOUS; SUBCUTANEOUS
Status: DISCONTINUED | OUTPATIENT
Start: 2023-11-21 | End: 2023-11-29 | Stop reason: HOSPADM

## 2023-11-21 RX ADMIN — DOCUSATE SODIUM 100 MG: 100 CAPSULE, LIQUID FILLED ORAL at 08:05

## 2023-11-21 RX ADMIN — AMLODIPINE BESYLATE 10 MG: 5 TABLET ORAL at 08:05

## 2023-11-21 RX ADMIN — LITHIUM CARBONATE 900 MG: 450 TABLET, EXTENDED RELEASE ORAL at 21:31

## 2023-11-21 RX ADMIN — NICOTINE POLACRILEX 4 MG: 4 GUM, CHEWING BUCCAL at 22:20

## 2023-11-21 RX ADMIN — NICOTINE POLACRILEX 4 MG: 4 GUM, CHEWING BUCCAL at 06:09

## 2023-11-21 RX ADMIN — QUETIAPINE FUMARATE 200 MG: 200 TABLET ORAL at 21:13

## 2023-11-21 RX ADMIN — ACETAMINOPHEN 650 MG: 325 TABLET, FILM COATED ORAL at 21:13

## 2023-11-21 RX ADMIN — ONDANSETRON 4 MG: 4 TABLET ORAL at 12:52

## 2023-11-21 RX ADMIN — INSULIN GLARGINE 25 UNITS: 100 INJECTION, SOLUTION SUBCUTANEOUS at 08:15

## 2023-11-21 RX ADMIN — DOCUSATE SODIUM 100 MG: 100 CAPSULE, LIQUID FILLED ORAL at 19:12

## 2023-11-21 RX ADMIN — PROPRANOLOL HYDROCHLORIDE 10 MG: 10 TABLET ORAL at 08:05

## 2023-11-21 RX ADMIN — DOXYCYCLINE HYCLATE 100 MG: 100 CAPSULE ORAL at 08:05

## 2023-11-21 RX ADMIN — PANTOPRAZOLE SODIUM 40 MG: 40 TABLET, DELAYED RELEASE ORAL at 08:05

## 2023-11-21 RX ADMIN — PROPRANOLOL HYDROCHLORIDE 10 MG: 10 TABLET ORAL at 19:12

## 2023-11-21 RX ADMIN — PROPRANOLOL HYDROCHLORIDE 10 MG: 10 TABLET ORAL at 13:30

## 2023-11-21 RX ADMIN — ACETAMINOPHEN 650 MG: 325 TABLET, FILM COATED ORAL at 03:02

## 2023-11-21 RX ADMIN — ACETAMINOPHEN 650 MG: 325 TABLET, FILM COATED ORAL at 10:12

## 2023-11-21 RX ADMIN — ACETAMINOPHEN 650 MG: 325 TABLET, FILM COATED ORAL at 13:30

## 2023-11-21 RX ADMIN — TESTOSTERONE 50 MG OF TESTOSTERONE: 50 GEL TRANSDERMAL at 08:04

## 2023-11-21 RX ADMIN — Medication 3 MG: at 22:18

## 2023-11-21 RX ADMIN — GABAPENTIN 1200 MG: 600 TABLET, FILM COATED ORAL at 08:05

## 2023-11-21 RX ADMIN — GABAPENTIN 1200 MG: 600 TABLET, FILM COATED ORAL at 19:11

## 2023-11-21 RX ADMIN — NICOTINE POLACRILEX 4 MG: 4 GUM, CHEWING BUCCAL at 21:13

## 2023-11-21 RX ADMIN — ROSUVASTATIN 40 MG: 20 TABLET, FILM COATED ORAL at 08:05

## 2023-11-21 RX ADMIN — NICOTINE 1 PATCH: 21 PATCH, EXTENDED RELEASE TRANSDERMAL at 08:04

## 2023-11-21 RX ADMIN — SULINDAC 200 MG: 200 TABLET ORAL at 13:30

## 2023-11-21 RX ADMIN — FLUPHENAZINE HYDROCHLORIDE 10 MG: 2.5 TABLET, FILM COATED ORAL at 08:04

## 2023-11-21 RX ADMIN — OXYCODONE HYDROCHLORIDE 5 MG: 5 TABLET ORAL at 11:59

## 2023-11-21 RX ADMIN — CLONAZEPAM 1 MG: 1 TABLET ORAL at 08:05

## 2023-11-21 RX ADMIN — GABAPENTIN 1200 MG: 600 TABLET, FILM COATED ORAL at 13:30

## 2023-11-21 RX ADMIN — TRETINOIN: 0.5 CREAM TOPICAL at 18:19

## 2023-11-21 RX ADMIN — NICOTINE POLACRILEX 4 MG: 4 GUM, CHEWING BUCCAL at 10:58

## 2023-11-21 RX ADMIN — POLYETHYLENE GLYCOL 3350 17 G: 17 POWDER, FOR SOLUTION ORAL at 08:04

## 2023-11-21 RX ADMIN — Medication 2.5 MG: at 18:55

## 2023-11-21 RX ADMIN — FLUPHENAZINE HYDROCHLORIDE 3 MG: 1 TABLET, FILM COATED ORAL at 16:40

## 2023-11-21 RX ADMIN — EMPAGLIFLOZIN 10 MG: 10 TABLET, FILM COATED ORAL at 08:05

## 2023-11-21 RX ADMIN — DOXYCYCLINE HYCLATE 100 MG: 100 CAPSULE ORAL at 19:12

## 2023-11-21 RX ADMIN — ATOMOXETINE 25 MG: 25 CAPSULE ORAL at 08:09

## 2023-11-21 RX ADMIN — NICOTINE POLACRILEX 4 MG: 4 GUM, CHEWING BUCCAL at 08:05

## 2023-11-21 RX ADMIN — NICOTINE POLACRILEX 4 MG: 4 GUM, CHEWING BUCCAL at 20:32

## 2023-11-21 RX ADMIN — NICOTINE POLACRILEX 4 MG: 4 GUM, CHEWING BUCCAL at 13:04

## 2023-11-21 ASSESSMENT — ACTIVITIES OF DAILY LIVING (ADL)
HYGIENE/GROOMING: INDEPENDENT
ADLS_ACUITY_SCORE: 42
LAUNDRY: WITH SUPERVISION
LAUNDRY: WITH SUPERVISION
ADLS_ACUITY_SCORE: 42
HYGIENE/GROOMING: INDEPENDENT
DRESS: INDEPENDENT
ADLS_ACUITY_SCORE: 42
ORAL_HYGIENE: INDEPENDENT
ADLS_ACUITY_SCORE: 42
DRESS: INDEPENDENT
ADLS_ACUITY_SCORE: 42
ADLS_ACUITY_SCORE: 42
ORAL_HYGIENE: INDEPENDENT
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
## 2023-11-21 NOTE — PROGRESS NOTES
S: Pt seen by staff for delivery of the LT wrist splint with metal stays removed. O: I see the EPIC order for the LT 5th metacarpal splint ( Boxers splint) with the metal removed. A: I fit the staff since the splint is one size fits all. The staff intern fit the patient with the splint. The paper instructions were left at the desk with staff. P: FU PRN. G: The goal is to protect the 5th metacarpal without metal stays.  Electronically signed Hector Booth CPO, LPO.

## 2023-11-21 NOTE — PLAN OF CARE
"Assessment/Intervention/Current Symtoms and Care Coordination:  Chart reviewed and patient met with team,   Discussed patient progress, symptomology, and response to treatment.  Discussed the discharge plan and any potential impediments to discharge.     Patient met with team. Patient had episode of SIB- punched wall again resulting in \" Mildly comminuted apex dorsally angulated and impacted fracture of the distal fifth metacarpal. \". Team discussed alternative coping skills again with patient.   Referral has been made to [facility] . CADI worker will work with  re:rates.     Writer will continue to coordinate care with CM, CADI and .     Discharge Plan or Goal:   Placement  Psychiatry  Therapy  IOP     Barriers to Discharge:  Severity of symptoms  Need for continued med mgmt per MD's.   PLacement needed     Referral Status:  IOP intake completed 11/10/23     Legal Status:  Committed- PD revoked 10/31/23     [facility]: [place]  File Number:  [id number]  Start and expiration date of commitment:   6/9/23 - 12/9/23?     Contacts:  Outpatient Psychiatrist: Regine Last [facility]  Primary Physician: KELIN Edwards  [facility] : Cristy Garrison [phone]  [facility] CM:Pretty Guzman,:[phone]  Family Members: Negra Prasad ([phone])     Upcoming Meetings and Dates/Important Information and next steps:  Patient has ACT team intake 11/29/23 at 3:00pm  Patient will need PD when discharged.     Team Update:   Wed  "

## 2023-11-21 NOTE — PLAN OF CARE
"  Problem: Adult Inpatient Plan of Care  Goal: Plan of Care Review  Description: The Plan of Care Review/Shift note should be completed every shift.  The Outcome Evaluation is a brief statement about your assessment that the patient is improving, declining, or no change.  This information will be displayed automatically on your shift  note.  Outcome: Progressing     Problem: Anxiety  Goal: Anxiety Reduction or Resolution  Outcome: Progressing     Problem: Psychotic Signs/Symptoms  Goal: Improved Behavioral Control (Psychotic Signs/Symptoms)  Outcome: Progressing     Problem: Pain Acute  Goal: Optimal Pain Control and Function  Outcome: Progressing   Goal Outcome Evaluation:    Plan of Care Reviewed With: patient      Pt was napping at the beginning of the shift, upon assessment patient stated he has mild anxiety/depression and didn't want intervention. Patient is alert and oriented and able to make needs known. After dinner time patient was on the phone multiple times, came to ask if he can have his dog to visit him, patient was told to give us time to look for the hospital policy then get back to him. Pt got so agitated and slammed his door and started hitting the walls with his arms. The writer went to talk to the pt and the pt stated \"get out of my room\" though he had already stopped hitting the wall. Another staff approached the pt and the pt sent the staff to call the writer. The pt was apologizing and wanted to see the doctor and X-ray to be done. The resident doctor came to see the pt.  After reading the policy and asking the ANS the pt was told he can be visited by the dog which was at 1920 stated it's too late he can have the dog tomorrow.   Both arms are swollen and very painful per the patient, PRN Tylenol and Clinoril given and alternating with the ice pack but pt still in a lot of pain. Pt requested Propanolol for agitation and Nicotine gum was utilized at least hourly.  Pt calm at this time and on SIO " 1:1 10 ft for  self injury

## 2023-11-21 NOTE — PLAN OF CARE
Pt SIO discontinued today, pt agreeable and sena for safety. Pt has been doing well off of SIO though he does continue to tend to have frequent requests. Pt has been visible in the milieu and attends OT groups. He continues to endorse L hand pain after hitting window yesterday; x-ray done showing fracture and one time PRN oxycodone placed. Writer encouraged pt to take PRN clinoril prior to taking PRN oxy but pt was insistent that oxy be given first d/t level of pain. He is med compliant with all scheduled meds, additionally received PRN tylenol x 2 and later took PRN clinoril for hand pain. Pt supplied with brace by orthotics with metal removed.     Problem: Psychotic Signs/Symptoms  Goal: Improved Behavioral Control (Psychotic Signs/Symptoms)  Outcome: Progressing   Goal Outcome Evaluation:

## 2023-11-21 NOTE — PROGRESS NOTES
"   11/21/2023    Group Therapy Session   Group Attendance attended group session   Time Session Began 1415   Time Session Ended 1500   Total Time (minutes) 45   Total # Attendees 3   Group Type psychotherapeutic   Group Topic Covered Group Topic Covered: emotions/expression, structured socialization   Group Session Detail Psychoeducation session: Provided information, statistics and tools to better understand mental health, share experiences, gain insights from others, and build sense of community.    Patient Response/Contribution listened actively, offered helpful suggestions to peers, requested more information about topic   Patient Participation Detail Pt reports he feels \"content,\" goal is to learn to assert his boundaries. Pt requested more information about coping skills for anxiety, discussed coping in group and will discuss further in 1:1 session. Pt's affect bright at times, blunted at other times.        "

## 2023-11-21 NOTE — PLAN OF CARE

## 2023-11-21 NOTE — PROGRESS NOTES
11/21/23 1453   Group Therapy Session   Group Attendance attended group session   Time Session Began 1315   Time Session Ended 1400   Total Time (minutes) 25   Total # Attendees 3   Group Type life skill;task skill   Group Session Detail Exercise and Cognitive Jenga for concentration, large motor movement, cognitive processing, simple reasoning/problem solving, follow through, FM skills, physical/spatial awareness, coping, mood stabilization, reality-based activity, grounding, decreasing anxiety, encouraging a healthy daily routine, and socialization.   Patient Participation Detail Pt joined group late, but immediately joined in all of the activities.  Pt shared that they were waiting for a splint for their wrist and that joining group would help to pass the time.  Pt recalled working with this writer during a previous hospitalization.  Pt relayed that they enjoyed the exercises and was open to modifying them as needed to accommodate their swollen wrist/hand.  No charge.
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
## 2023-11-21 NOTE — PLAN OF CARE
The pt appeared sleeping most of the night without apparent respiratory distress or safety concern until 3:00 AM. The pt complain both arm pain rated 9/10 per requested PRN Tylenol and ice pack given, upon recheck helpful noted and appeared back to sleep. The pt slept  6.75 hours. 15 minutes safety check on going and continue to monitor.     Problem: Sleep Disturbance  Goal: Adequate Sleep/Rest  Outcome: Progressing     Problem: Pain Acute  Goal: Optimal Pain Control and Function  Outcome: Progressing   Goal Outcome Evaluation:

## 2023-11-22 LAB
GLUCOSE BLDC GLUCOMTR-MCNC: 162 MG/DL (ref 70–99)
GLUCOSE BLDC GLUCOMTR-MCNC: 168 MG/DL (ref 70–99)
GLUCOSE BLDC GLUCOMTR-MCNC: 192 MG/DL (ref 70–99)

## 2023-11-22 PROCEDURE — 250N000013 HC RX MED GY IP 250 OP 250 PS 637

## 2023-11-22 PROCEDURE — G0177 OPPS/PHP; TRAIN & EDUC SERV: HCPCS

## 2023-11-22 PROCEDURE — 90832 PSYTX W PT 30 MINUTES: CPT

## 2023-11-22 PROCEDURE — 250N000013 HC RX MED GY IP 250 OP 250 PS 637: Performed by: PHYSICIAN ASSISTANT

## 2023-11-22 PROCEDURE — 99232 SBSQ HOSP IP/OBS MODERATE 35: CPT | Mod: GC | Performed by: PSYCHIATRY & NEUROLOGY

## 2023-11-22 PROCEDURE — 124N000002 HC R&B MH UMMC

## 2023-11-22 PROCEDURE — 250N000013 HC RX MED GY IP 250 OP 250 PS 637: Performed by: STUDENT IN AN ORGANIZED HEALTH CARE EDUCATION/TRAINING PROGRAM

## 2023-11-22 PROCEDURE — A9270 NON-COVERED ITEM OR SERVICE: HCPCS

## 2023-11-22 PROCEDURE — H2032 ACTIVITY THERAPY, PER 15 MIN: HCPCS

## 2023-11-22 RX ORDER — CLONAZEPAM 0.5 MG/1
0.5 TABLET ORAL DAILY
Status: DISCONTINUED | OUTPATIENT
Start: 2023-11-26 | End: 2023-11-28

## 2023-11-22 RX ADMIN — NICOTINE POLACRILEX 4 MG: 4 GUM, CHEWING BUCCAL at 20:31

## 2023-11-22 RX ADMIN — CLONAZEPAM 1 MG: 1 TABLET ORAL at 08:44

## 2023-11-22 RX ADMIN — NICOTINE POLACRILEX 4 MG: 4 GUM, CHEWING BUCCAL at 10:40

## 2023-11-22 RX ADMIN — GABAPENTIN 1200 MG: 600 TABLET, FILM COATED ORAL at 21:44

## 2023-11-22 RX ADMIN — Medication 5 MG: at 17:09

## 2023-11-22 RX ADMIN — ATOMOXETINE 25 MG: 25 CAPSULE ORAL at 08:43

## 2023-11-22 RX ADMIN — DOXYCYCLINE HYCLATE 100 MG: 100 CAPSULE ORAL at 08:44

## 2023-11-22 RX ADMIN — GABAPENTIN 1200 MG: 600 TABLET, FILM COATED ORAL at 08:44

## 2023-11-22 RX ADMIN — NICOTINE POLACRILEX 4 MG: 4 GUM, CHEWING BUCCAL at 08:44

## 2023-11-22 RX ADMIN — ACETAMINOPHEN 650 MG: 325 TABLET, FILM COATED ORAL at 10:28

## 2023-11-22 RX ADMIN — LITHIUM CARBONATE 900 MG: 450 TABLET, EXTENDED RELEASE ORAL at 21:43

## 2023-11-22 RX ADMIN — EMPAGLIFLOZIN 10 MG: 10 TABLET, FILM COATED ORAL at 08:44

## 2023-11-22 RX ADMIN — SULINDAC 200 MG: 200 TABLET ORAL at 11:36

## 2023-11-22 RX ADMIN — INSULIN GLARGINE 25 UNITS: 100 INJECTION, SOLUTION SUBCUTANEOUS at 08:42

## 2023-11-22 RX ADMIN — POLYETHYLENE GLYCOL 3350 17 G: 17 POWDER, FOR SOLUTION ORAL at 08:42

## 2023-11-22 RX ADMIN — Medication 2.5 MG: at 09:00

## 2023-11-22 RX ADMIN — PROPRANOLOL HYDROCHLORIDE 10 MG: 10 TABLET ORAL at 08:44

## 2023-11-22 RX ADMIN — QUETIAPINE FUMARATE 200 MG: 200 TABLET ORAL at 21:44

## 2023-11-22 RX ADMIN — NICOTINE POLACRILEX 4 MG: 4 GUM, CHEWING BUCCAL at 16:09

## 2023-11-22 RX ADMIN — FLUPHENAZINE HYDROCHLORIDE 10 MG: 2.5 TABLET, FILM COATED ORAL at 08:43

## 2023-11-22 RX ADMIN — Medication 3 MG: at 21:44

## 2023-11-22 RX ADMIN — AMLODIPINE BESYLATE 10 MG: 5 TABLET ORAL at 08:43

## 2023-11-22 RX ADMIN — TESTOSTERONE 50 MG OF TESTOSTERONE: 50 GEL TRANSDERMAL at 08:42

## 2023-11-22 RX ADMIN — NICOTINE POLACRILEX 4 MG: 4 GUM, CHEWING BUCCAL at 12:12

## 2023-11-22 RX ADMIN — ACETAMINOPHEN 650 MG: 325 TABLET, FILM COATED ORAL at 21:22

## 2023-11-22 RX ADMIN — PROPRANOLOL HYDROCHLORIDE 10 MG: 10 TABLET ORAL at 21:43

## 2023-11-22 RX ADMIN — NICOTINE POLACRILEX 4 MG: 4 GUM, CHEWING BUCCAL at 13:19

## 2023-11-22 RX ADMIN — NICOTINE POLACRILEX 4 MG: 4 GUM, CHEWING BUCCAL at 21:22

## 2023-11-22 RX ADMIN — NICOTINE 1 PATCH: 21 PATCH, EXTENDED RELEASE TRANSDERMAL at 08:42

## 2023-11-22 RX ADMIN — Medication 2.5 MG: at 13:55

## 2023-11-22 RX ADMIN — NICOTINE POLACRILEX 4 MG: 4 GUM, CHEWING BUCCAL at 17:29

## 2023-11-22 RX ADMIN — PROPRANOLOL HYDROCHLORIDE 10 MG: 10 TABLET ORAL at 13:55

## 2023-11-22 RX ADMIN — NICOTINE POLACRILEX 4 MG: 4 GUM, CHEWING BUCCAL at 14:31

## 2023-11-22 RX ADMIN — DOXYCYCLINE HYCLATE 100 MG: 100 CAPSULE ORAL at 21:44

## 2023-11-22 RX ADMIN — GABAPENTIN 1200 MG: 600 TABLET, FILM COATED ORAL at 13:55

## 2023-11-22 RX ADMIN — DOCUSATE SODIUM 100 MG: 100 CAPSULE, LIQUID FILLED ORAL at 21:43

## 2023-11-22 RX ADMIN — PANTOPRAZOLE SODIUM 40 MG: 40 TABLET, DELAYED RELEASE ORAL at 08:44

## 2023-11-22 RX ADMIN — DOCUSATE SODIUM 100 MG: 100 CAPSULE, LIQUID FILLED ORAL at 08:44

## 2023-11-22 RX ADMIN — ROSUVASTATIN 40 MG: 20 TABLET, FILM COATED ORAL at 08:43

## 2023-11-22 RX ADMIN — TRETINOIN: 0.5 CREAM TOPICAL at 21:44

## 2023-11-22 ASSESSMENT — ACTIVITIES OF DAILY LIVING (ADL)
LAUNDRY: WITH SUPERVISION
ADLS_ACUITY_SCORE: 42
ORAL_HYGIENE: INDEPENDENT
DRESS: INDEPENDENT
HYGIENE/GROOMING: INDEPENDENT
ADLS_ACUITY_SCORE: 42
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
## 2023-11-22 NOTE — PLAN OF CARE

## 2023-11-22 NOTE — PROGRESS NOTES
"Brief Medicine Follow Up Note    Following up regarding 5th left metacarpal #.  Spoke to psychiatry yesterday who noted pt experiencing persistent pain    Today's vital signs, medications, and nursing notes were reviewed. Labs reviewed most recent serum and urine laboratories.     BP (!) 133/93 (BP Location: Left arm, Patient Position: Sitting, Cuff Size: Adult Regular)   Pulse 80   Temp 97.7  F (36.5  C) (Temporal)   Resp 18   Ht 1.702 m (5' 7\")   Wt 107.1 kg (236 lb 1.6 oz)   SpO2 97%   BMI 36.98 kg/m        A/P:  Fracture of the left distal fifth metacarpal   (See consult for further details)  - appropriate for oxycodone 2.5-5mg q8h and can increase to q6h if pain limiting patient ability to sleep, can continue up to 1 week  - ice or heat to left hand as needed and if safe  - Follow up with orthopedics in outpatient setting  - On sulindac, 200mg BID PRN (NSAID approved for use with lithium)  - tylenol 650 mg q 4 hours PRN   - orthotics consult placed for velcro wrist brace, remove metal component    Please notify medicine if new acute concern, such as new deficit or out of proportion pain in right hand    Medicine will sign off at this time, please call with questions or concerns    Belkys Cerda PA-C  [facility]  Contact information available via [facility] Paging/Directory    "

## 2023-11-22 NOTE — PLAN OF CARE
"  Duration: Met with patient for a total of 20 minutes.    Time start: 1215  Time end 1235    Modality Used:CBT, Person Centered, and Brief Therapy    Goals: Continue to learn how to assert healthy boundaries and control impulses    Pt progress: Pt reports he feels \"renewed\" after visit from his service dog last evening. Pt described a time that he felt he may have overstepped a boundary; discussed negative emotions Pt felt afterward and explored what Pt is looking for: feeling validated, seen and comforted. Pt shared he was able to control anger impulse successfully this morning. Writer commended Pt on use of new skills and offered words of encouragement.      Treatment Objective(s) Addressed:   The focus of this session was on processing feelings related to boundaries in relationships and anger management     Progress Towards Goals and Assessment of Patient:   Patient reports improving symptoms. Patient is making progress towards treatment goals as evidenced by ability to control impulses and identify issues with boundaries.     Therapeutic Intervention(s):   Provided active listening, unconditional positive regard, and validation. Taught the link between thoughts, feelings, and behaviors. Provided positive reinforcement for progress towards goals, gains in knowledge, and application of skills previously taught.  Explored strategies for self-soothing.     Plan/next step:   Writer will continue to check in with Pt, encouraged Pt to attend group sessions.       Lydia Salazar [facility]  Psychotherapist          "

## 2023-11-22 NOTE — CARE PLAN
[facility] Occupational Therapy Group Intervention Note     11/22/23 1220   Group Therapy Session   Group Attendance attended group session   Time Session Began 1115   Time Session Ended 1200   Total Time (minutes) 45   Total # Attendees 5   Group Type task skill;life skill   Group Topic Covered coping skills/lifestyle management;relapse prevention;self-care activities   Group Session Detail Mental health management group focused on coping skill exploration. Education was provided on maladaptive versus adaptive response to stress /symptoms and use within routine.    Patient Response/Contribution cooperative with task;listened actively;discussed personal experience with topic;organized   Patient Participation Detail Pt engaged in independent reflection and group discussion. Demonstrated excellent insight r/t unhealthy coping skills (ie. misuse of marijuana, physical aggression, and SIB). Identified several alternative healthy coping skills utilized to reduce stress and manage symptoms - ie. : learn a new hobby, medication, medication (PRNs within moderation), and pets, to name a few. Elaborated on application of skills to personal routine. Accepted additional suggestions within conversation.      Callie Benjamin OT on 11/22/2023 at 12:20 PM

## 2023-11-22 NOTE — PLAN OF CARE
Problem: Sleep Disturbance  Goal: Adequate Sleep/Rest  Outcome: Progressing   Goal Outcome Evaluation:      Pt had a quiet night. No PRN requested. No pain issues or safety issues. Appears to have slept for 6,5 hours.    Temp: 98  F (36.7  C) Temp src: Oral BP: (!) 145/83 Pulse: 95   Resp: 16 SpO2: 95 % O2 Device: None (Room air)

## 2023-11-22 NOTE — PLAN OF CARE
Problem: Adult Inpatient Plan of Care  Goal: Plan of Care Review  Description: The Plan of Care Review/Shift note should be completed every shift.  The Outcome Evaluation is a brief statement about your assessment that the patient is improving, declining, or no change.  This information will be displayed automatically on your shift  note.  Outcome: Progressing     Problem: Adult Behavioral Health Plan of Care  Goal: Plan of Care Review  Outcome: Progressing  Flowsheets (Taken 11/21/2023 1600)  Patient Agreement with Plan of Care: agrees     Problem: Anxiety  Goal: Anxiety Reduction or Resolution  Outcome: Progressing     Problem: Pain Acute  Goal: Optimal Pain Control and Function  Outcome: Progressing   Goal Outcome Evaluation:    Plan of Care Reviewed With: patient      Pt is presented with some agitation, C/O more pain on the left arm rated 10/10, paged the doctor and the order for Oxycodone 2.5-5 mg was placed. PRN oxycodone 2.5 mg was given and pt verbalized it was somehow effective. Pt paced the  hallway. Alert and oriented and able to make needs known. Endorsed anxiety/depression high but didn't want intervention at the moment, A/hallucination that are commanding him to hurt someone bu pt able to contract for safety. Pt was visited by his friend accompanied by the service dog and was happy about it, stated it made a huge difference. Patient is complaint with medication, PRN Tylenol, Oxycodone, Melatonin and Nicotine gum was given this shift. Adequate PO intake. No safety concerns or behavior issues was noted/reported.

## 2023-11-22 NOTE — PLAN OF CARE
Problem: Psychotic Signs/Symptoms  Goal: Improved Behavioral Control (Psychotic Signs/Symptoms)  Outcome: Progressing  Goal: Improved Mood Symptoms (Psychotic Signs/Symptoms)  Outcome: Progressing   Goal Outcome Evaluation:  Patient alert and able to make needs known, endorsing pain in left hand, PRN Oxycodone 2.5 mg given with all schedule morning medications, patient returned to within an hour and requested for Tylenol after bumping hand on wall. Medication was not fully effective, PRN Sulindac given at 11:36 am and medication was still not effective. Provider paged and one time order placed for 2.5 mg Oxy, patient took med and is using distractions to manage hallucinations and pain.  Patient denied anxiety, depression, SI and SIB, continue to endorse auditory hallucinations and is sena for safety. Patient participated in group activities, intake is adequate, patient blood sugar 162 and 197 this shift, continue to monitor

## 2023-11-22 NOTE — PROVIDER NOTIFICATION
11/22/23 0839   Individualization/Patient Specific Goals   Patient Personal Strengths community support;expressive of emotions;expressive of needs;family/social support;humor;interests/hobbies;positive attitude;resilient;resourceful;stable living environment;tolerant   Patient Vulnerabilities adverse childhood experience(s);history of unsuccessful treatment;substance abuse/addiction;traumatic event   Anxieties, Fears or Concerns None   Individualized Care Needs None   Interprofessional Rounds   Summary 1. Stabilization of mood/thought disorder sx's 2. Safe with self 3. Medication mgmt per MD's 4. Coordination of care with outpatient providers 5.Housing addressed 6. Outpatient f/u care in place   Participants CTC;nursing;psychiatrist;patient   Behavioral Team Discussion   Participants Dr. Willson, Dr De Leon,.  Suyapa Correa RN, Luz Lincoln MA.LP, Pharmacy, Med students   Progress Patient reports s/w feeling better.  Patient has been agreeable to taper off benzo's- med adjustments. Had 1 incident of hitting wall resulting in fracture.   Anticipated length of stay 10-14 days   Continued Stay Criteria/Rationale S/P overdose, medication mgmt, placement   Medical/Physical Fractured finger   Precautions Per unit protocol-   Plan Patient will be seen by Psychiatry daily.  Meds will be reviewed/adjusted per MD's. GH has been found.  Awaiting negotions of rates.. Care will continue to be coordinated with outpatient providers. CTC willensure appropriate f/u care is in place   Rationale for change in precautions or plan No change in plan/precautions   Anticipated Discharge Disposition group home

## 2023-11-23 LAB
GLUCOSE BLDC GLUCOMTR-MCNC: 167 MG/DL (ref 70–99)
GLUCOSE BLDC GLUCOMTR-MCNC: 182 MG/DL (ref 70–99)
GLUCOSE BLDC GLUCOMTR-MCNC: 188 MG/DL (ref 70–99)

## 2023-11-23 PROCEDURE — A9270 NON-COVERED ITEM OR SERVICE: HCPCS

## 2023-11-23 PROCEDURE — 250N000013 HC RX MED GY IP 250 OP 250 PS 637

## 2023-11-23 PROCEDURE — 124N000002 HC R&B MH UMMC

## 2023-11-23 PROCEDURE — 250N000013 HC RX MED GY IP 250 OP 250 PS 637: Performed by: PHYSICIAN ASSISTANT

## 2023-11-23 PROCEDURE — 250N000013 HC RX MED GY IP 250 OP 250 PS 637: Performed by: STUDENT IN AN ORGANIZED HEALTH CARE EDUCATION/TRAINING PROGRAM

## 2023-11-23 RX ADMIN — DOXYCYCLINE HYCLATE 100 MG: 100 CAPSULE ORAL at 19:59

## 2023-11-23 RX ADMIN — NICOTINE 1 PATCH: 21 PATCH, EXTENDED RELEASE TRANSDERMAL at 08:03

## 2023-11-23 RX ADMIN — PROPRANOLOL HYDROCHLORIDE 10 MG: 10 TABLET ORAL at 19:59

## 2023-11-23 RX ADMIN — Medication 0.75 MG: at 08:04

## 2023-11-23 RX ADMIN — DOCUSATE SODIUM 100 MG: 100 CAPSULE, LIQUID FILLED ORAL at 08:08

## 2023-11-23 RX ADMIN — EMPAGLIFLOZIN 10 MG: 10 TABLET, FILM COATED ORAL at 08:08

## 2023-11-23 RX ADMIN — TESTOSTERONE 50 MG OF TESTOSTERONE: 50 GEL TRANSDERMAL at 08:08

## 2023-11-23 RX ADMIN — Medication 5 MG: at 07:24

## 2023-11-23 RX ADMIN — NICOTINE POLACRILEX 4 MG: 4 GUM, CHEWING BUCCAL at 11:18

## 2023-11-23 RX ADMIN — POLYETHYLENE GLYCOL 3350 17 G: 17 POWDER, FOR SOLUTION ORAL at 08:03

## 2023-11-23 RX ADMIN — GABAPENTIN 1200 MG: 600 TABLET, FILM COATED ORAL at 19:59

## 2023-11-23 RX ADMIN — ACETAMINOPHEN 650 MG: 325 TABLET, FILM COATED ORAL at 01:32

## 2023-11-23 RX ADMIN — NICOTINE POLACRILEX 4 MG: 4 GUM, CHEWING BUCCAL at 17:28

## 2023-11-23 RX ADMIN — Medication 5 MG: at 22:31

## 2023-11-23 RX ADMIN — GABAPENTIN 1200 MG: 600 TABLET, FILM COATED ORAL at 08:06

## 2023-11-23 RX ADMIN — NICOTINE POLACRILEX 4 MG: 4 GUM, CHEWING BUCCAL at 15:05

## 2023-11-23 RX ADMIN — NICOTINE POLACRILEX 4 MG: 4 GUM, CHEWING BUCCAL at 08:06

## 2023-11-23 RX ADMIN — ATOMOXETINE 25 MG: 25 CAPSULE ORAL at 08:08

## 2023-11-23 RX ADMIN — INSULIN GLARGINE 25 UNITS: 100 INJECTION, SOLUTION SUBCUTANEOUS at 08:08

## 2023-11-23 RX ADMIN — NICOTINE POLACRILEX 4 MG: 4 GUM, CHEWING BUCCAL at 12:30

## 2023-11-23 RX ADMIN — ROSUVASTATIN 40 MG: 20 TABLET, FILM COATED ORAL at 08:07

## 2023-11-23 RX ADMIN — LITHIUM CARBONATE 900 MG: 450 TABLET, EXTENDED RELEASE ORAL at 20:03

## 2023-11-23 RX ADMIN — DOXYCYCLINE HYCLATE 100 MG: 100 CAPSULE ORAL at 08:08

## 2023-11-23 RX ADMIN — PANTOPRAZOLE SODIUM 40 MG: 40 TABLET, DELAYED RELEASE ORAL at 08:08

## 2023-11-23 RX ADMIN — AMLODIPINE BESYLATE 10 MG: 5 TABLET ORAL at 08:09

## 2023-11-23 RX ADMIN — QUETIAPINE FUMARATE 200 MG: 200 TABLET ORAL at 22:30

## 2023-11-23 RX ADMIN — FLUPHENAZINE HYDROCHLORIDE 10 MG: 2.5 TABLET, FILM COATED ORAL at 08:07

## 2023-11-23 RX ADMIN — ACETAMINOPHEN 650 MG: 325 TABLET, FILM COATED ORAL at 17:28

## 2023-11-23 RX ADMIN — NICOTINE POLACRILEX 4 MG: 4 GUM, CHEWING BUCCAL at 19:59

## 2023-11-23 RX ADMIN — DOCUSATE SODIUM 100 MG: 100 CAPSULE, LIQUID FILLED ORAL at 19:59

## 2023-11-23 RX ADMIN — ACETAMINOPHEN 650 MG: 325 TABLET, FILM COATED ORAL at 11:18

## 2023-11-23 RX ADMIN — NICOTINE POLACRILEX 4 MG: 4 GUM, CHEWING BUCCAL at 13:52

## 2023-11-23 RX ADMIN — NICOTINE POLACRILEX 4 MG: 4 GUM, CHEWING BUCCAL at 07:21

## 2023-11-23 RX ADMIN — PROPRANOLOL HYDROCHLORIDE 10 MG: 10 TABLET ORAL at 08:06

## 2023-11-23 RX ADMIN — NICOTINE POLACRILEX 4 MG: 4 GUM, CHEWING BUCCAL at 18:39

## 2023-11-23 RX ADMIN — Medication 5 MG: at 15:03

## 2023-11-23 RX ADMIN — GABAPENTIN 1200 MG: 600 TABLET, FILM COATED ORAL at 15:04

## 2023-11-23 RX ADMIN — PROPRANOLOL HYDROCHLORIDE 10 MG: 10 TABLET ORAL at 15:04

## 2023-11-23 ASSESSMENT — ACTIVITIES OF DAILY LIVING (ADL)
ADLS_ACUITY_SCORE: 42
DRESS: INDEPENDENT
HYGIENE/GROOMING: INDEPENDENT
ADLS_ACUITY_SCORE: 42
LAUNDRY: WITH SUPERVISION
ADLS_ACUITY_SCORE: 42
ORAL_HYGIENE: INDEPENDENT

## 2023-11-23 NOTE — PROGRESS NOTES
11/22/23 2100   Group Therapy Session   Group Attendance attended group session   Time Session Began 2000   Time Session Ended 2050   Total Time (minutes) 50   Total # Attendees 3   Group Type recreation   Group Topic Covered leisure exploration/use of leisure time   Group Session Detail TR leisure group   Patient Response/Contribution cooperative with task   Patient Participation Detail Pt attended the structured Therapeutic Recreation group, participating in a group activity. Pt participated in group discussion, leisure participation, and social engagement to gain self-esteem, manage behaviors, improve social skills, decrease isolation, and reduce anxiety/depression.   Pt remained focused and engaged throughout group activity.  Pt mood was sociable and was appropriate with interactions with the others in group. Pt was active in contributing to the clues and descriptions throughout the activity. Pt was a full participant for the duration of the group. Pt had a bright affect, often laughing and joking with peers appropriately.

## 2023-11-23 NOTE — PLAN OF CARE
"The pt visible intermittently out on milieu sitting in lounge watching TV and coloring. Upon approach presented calm pleasant in mood acknowledged improvement in mood today, endorsed minimal anxiety and depression for both 3/10, endorsed minimal AH with command but able to redirect to other positive coping, denied SI, HI, SIB and contracted for safety. Complain left hand pain 7/10, per requested PRN Oxycodone 5 mg given and reported helpful. PRN Tylenol given during HS to manage pain on the hand. The pt frequently seek out staff and receptive for redirection. BS pre-supper 168, appetite good and adequate fluid intake. Comply with med and care. Per requested PRN Melatonin given for sleep aid. PRN tretinoin cream applied for acne as requested. No behavioral outburst or hitting episodes during the shift. Appeared attending OT group.    /73 (BP Location: Right arm, Patient Position: Sitting, Cuff Size: Adult Large)   Pulse 90   Temp 97.8  F (36.6  C) (Oral)   Resp 16   Ht 1.702 m (5' 7\")   Wt 107.1 kg (236 lb 1.6 oz)   SpO2 97%   BMI 36.98 kg/m     Problem: Anxiety  Goal: Anxiety Reduction or Resolution  Outcome: Progressing  Intervention: Promote Anxiety Reduction  Recent Flowsheet Documentation  Taken 11/22/2023 1700 by Tim Escobar RN  Supportive Measures:   active listening utilized                    positive reinforcement provided   relaxation techniques promoted      self-care encouraged   verbalization of feelings encouraged  Family/Support System Care:   involvement promoted        self-care encouraged     Problem: Psychotic Signs/Symptoms  Goal: Improved Mood Symptoms (Psychotic Signs/Symptoms)  Outcome: Progressing  Flowsheets (Taken 11/22/2023 1700)  Mutually Determined Action Steps (Improved Mood Symptoms): acknowledges progress  Intervention: Optimize Emotion and Mood  Recent Flowsheet Documentation  Taken 11/22/2023 1700 by Tim Escobar, RN  Supportive Measures:   active listening " utilized                   positive reinforcement provided   relaxation techniques promoted    self-care encouraged   verbalization of feelings encouraged  Diversional Activity:   art work     music   television  Goal: Improved Psychomotor Symptoms (Psychotic Signs/Symptoms)  Outcome: Progressing  Intervention: Manage Psychomotor Movement  Recent Flowsheet Documentation  Taken 11/22/2023 1700 by Tim Escobar, RN  Diversional Activity:   art work      music   television   Goal Outcome Evaluation:

## 2023-11-23 NOTE — PLAN OF CARE
"  Problem: Adult Inpatient Plan of Care  Goal: Plan of Care Review  Description: The Plan of Care Review/Shift note should be completed every shift.  The Outcome Evaluation is a brief statement about your assessment that the patient is improving, declining, or no change.  This information will be displayed automatically on your shift  note.  Outcome: Progressing     Problem: Psychotic Signs/Symptoms  Goal: Improved Behavioral Control (Psychotic Signs/Symptoms)  Outcome: Progressing   Goal Outcome Evaluation:    Patient was overall calm and pleasant on approach.Denied,SI/SIB/HI/AV/VH/ and contracted for safety. Refused to attend evening group sessions. Stated \"My mom has not signed for me to attend group. I don't need to be there.\" Patient was out in the milieu through most of the shift. Watched TV/movies with peers. Appetite was good, evidence by patient eating 100% of dinner and HS snacks. Blood sugar levels @ HS was 162 mg/dl and no intervention present per order. Patient endorsed right hand pain of 9/10. Scheduled Oxycodone PRN 5 mg given with limited effect. Vitals were stable (see flow sheet) and no psychotic behavior was observed.  Patient was nevertheless extremely needy. Requested nicotine every hour. Will continue to monitor and encourage.             "

## 2023-11-23 NOTE — PLAN OF CARE
Problem: Psychotic Signs/Symptoms  Goal: Improved Behavioral Control (Psychotic Signs/Symptoms)  Outcome: Progressing     Problem: Psychotic Signs/Symptoms  Goal: Improved Mood Symptoms (Psychotic Signs/Symptoms)  Intervention: Optimize Emotion and Mood  Recent Flowsheet Documentation  Taken 11/23/2023 1000 by Paty Lovett RN  Supportive Measures:   active listening utilized   self-care encouraged   self-reflection promoted   self-responsibility promoted   verbalization of feelings encouraged   Goal Outcome Evaluation:    Patient presented with full range affect, mood restless and endorsed pain to left hand, he stated that PRN med was slightly effective but still rates pain at 6/10, immobilizer to left hand, patient range of motion and circulation are within normal limit, he is compliant with current medications regimen, frequently seeks out staffs with frequent request. Patient denied all mental health symptoms except commanding auditory hallucinations which he described as under control and was able to contract for safety. Blood sugar this shift was 182 and 188, has adequate intake of food and fluids, was able to remove the immobilizer for few minutes and ice for comfort, PRN Tylenol given without relief, patient took 5 mg Oxycodone at 3 PM. Patient requested to view pictures of possible places to move, he was able to talk to friend and was informed that placement was not accepted, patient stated he will call previous placement and return if possible.

## 2023-11-23 NOTE — PLAN OF CARE
Problem: Sleep Disturbance  Goal: Adequate Sleep/Rest  Outcome: Progressing    Pt appears to have slept for 6.75 hours. PRN tylenol and Oxycodone were given for complain of left hand pain rated 5/10. PRN medications were effective, as there were no further complain of pain, and pt was  able to sleep for the most part of the shift. 15 minutes safety checks were in place during shift.  Staff will continue to offer support to pt.

## 2023-11-24 LAB
GLUCOSE BLDC GLUCOMTR-MCNC: 136 MG/DL (ref 70–99)
GLUCOSE BLDC GLUCOMTR-MCNC: 139 MG/DL (ref 70–99)
GLUCOSE BLDC GLUCOMTR-MCNC: 143 MG/DL (ref 70–99)

## 2023-11-24 PROCEDURE — 250N000013 HC RX MED GY IP 250 OP 250 PS 637

## 2023-11-24 PROCEDURE — 250N000013 HC RX MED GY IP 250 OP 250 PS 637: Performed by: STUDENT IN AN ORGANIZED HEALTH CARE EDUCATION/TRAINING PROGRAM

## 2023-11-24 PROCEDURE — 124N000002 HC R&B MH UMMC

## 2023-11-24 PROCEDURE — A9270 NON-COVERED ITEM OR SERVICE: HCPCS

## 2023-11-24 PROCEDURE — 250N000013 HC RX MED GY IP 250 OP 250 PS 637: Performed by: PHYSICIAN ASSISTANT

## 2023-11-24 RX ADMIN — PROPRANOLOL HYDROCHLORIDE 10 MG: 10 TABLET ORAL at 15:18

## 2023-11-24 RX ADMIN — GABAPENTIN 1200 MG: 600 TABLET, FILM COATED ORAL at 08:25

## 2023-11-24 RX ADMIN — NICOTINE POLACRILEX 4 MG: 4 GUM, CHEWING BUCCAL at 19:04

## 2023-11-24 RX ADMIN — PANTOPRAZOLE SODIUM 40 MG: 40 TABLET, DELAYED RELEASE ORAL at 08:24

## 2023-11-24 RX ADMIN — DOCUSATE SODIUM 100 MG: 100 CAPSULE, LIQUID FILLED ORAL at 08:25

## 2023-11-24 RX ADMIN — NICOTINE POLACRILEX 4 MG: 4 GUM, CHEWING BUCCAL at 11:43

## 2023-11-24 RX ADMIN — GABAPENTIN 1200 MG: 600 TABLET, FILM COATED ORAL at 15:18

## 2023-11-24 RX ADMIN — FLUPHENAZINE HYDROCHLORIDE 10 MG: 2.5 TABLET, FILM COATED ORAL at 08:25

## 2023-11-24 RX ADMIN — LITHIUM CARBONATE 900 MG: 450 TABLET, EXTENDED RELEASE ORAL at 23:06

## 2023-11-24 RX ADMIN — ACETAMINOPHEN 650 MG: 325 TABLET, FILM COATED ORAL at 12:23

## 2023-11-24 RX ADMIN — NICOTINE POLACRILEX 4 MG: 4 GUM, CHEWING BUCCAL at 09:13

## 2023-11-24 RX ADMIN — PROPRANOLOL HYDROCHLORIDE 10 MG: 10 TABLET ORAL at 19:03

## 2023-11-24 RX ADMIN — NICOTINE POLACRILEX 4 MG: 4 GUM, CHEWING BUCCAL at 10:41

## 2023-11-24 RX ADMIN — NICOTINE POLACRILEX 4 MG: 4 GUM, CHEWING BUCCAL at 16:30

## 2023-11-24 RX ADMIN — GABAPENTIN 1200 MG: 600 TABLET, FILM COATED ORAL at 19:03

## 2023-11-24 RX ADMIN — Medication 0.75 MG: at 08:23

## 2023-11-24 RX ADMIN — POLYETHYLENE GLYCOL 3350 17 G: 17 POWDER, FOR SOLUTION ORAL at 08:25

## 2023-11-24 RX ADMIN — PROPRANOLOL HYDROCHLORIDE 10 MG: 10 TABLET ORAL at 08:24

## 2023-11-24 RX ADMIN — TESTOSTERONE 50 MG OF TESTOSTERONE: 50 GEL TRANSDERMAL at 08:23

## 2023-11-24 RX ADMIN — Medication 5 MG: at 07:47

## 2023-11-24 RX ADMIN — INSULIN GLARGINE 25 UNITS: 100 INJECTION, SOLUTION SUBCUTANEOUS at 08:28

## 2023-11-24 RX ADMIN — DOXYCYCLINE HYCLATE 100 MG: 100 CAPSULE ORAL at 19:03

## 2023-11-24 RX ADMIN — ROSUVASTATIN 40 MG: 20 TABLET, FILM COATED ORAL at 08:25

## 2023-11-24 RX ADMIN — DOCUSATE SODIUM 100 MG: 100 CAPSULE, LIQUID FILLED ORAL at 19:04

## 2023-11-24 RX ADMIN — NICOTINE POLACRILEX 4 MG: 4 GUM, CHEWING BUCCAL at 07:47

## 2023-11-24 RX ADMIN — NICOTINE POLACRILEX 4 MG: 4 GUM, CHEWING BUCCAL at 15:18

## 2023-11-24 RX ADMIN — Medication 5 MG: at 23:16

## 2023-11-24 RX ADMIN — EMPAGLIFLOZIN 10 MG: 10 TABLET, FILM COATED ORAL at 08:24

## 2023-11-24 RX ADMIN — ATOMOXETINE 25 MG: 25 CAPSULE ORAL at 08:24

## 2023-11-24 RX ADMIN — AMLODIPINE BESYLATE 10 MG: 5 TABLET ORAL at 08:25

## 2023-11-24 RX ADMIN — Medication 5 MG: at 15:18

## 2023-11-24 RX ADMIN — DOXYCYCLINE HYCLATE 100 MG: 100 CAPSULE ORAL at 08:25

## 2023-11-24 RX ADMIN — QUETIAPINE FUMARATE 200 MG: 200 TABLET ORAL at 23:09

## 2023-11-24 RX ADMIN — NICOTINE 1 PATCH: 21 PATCH, EXTENDED RELEASE TRANSDERMAL at 08:23

## 2023-11-24 ASSESSMENT — ACTIVITIES OF DAILY LIVING (ADL)
DRESS: INDEPENDENT
ADLS_ACUITY_SCORE: 42
LAUNDRY: WITH SUPERVISION
HYGIENE/GROOMING: INDEPENDENT
ADLS_ACUITY_SCORE: 42
ORAL_HYGIENE: INDEPENDENT
HYGIENE/GROOMING: INDEPENDENT
LAUNDRY: WITH SUPERVISION
ADLS_ACUITY_SCORE: 42
ORAL_HYGIENE: INDEPENDENT
DRESS: INDEPENDENT

## 2023-11-24 NOTE — PLAN OF CARE
"Upon initial assessment this morning, pt communicated frustration and anxiety regarding the fracture in in his left hand. He stated he was experiencing high level of pain/discomfort. He requested and was given PRN Oxycodone (5mg). Per orders, writer offered pt PRN Sulindac first but he declined, saying: \"It doesn't work.\" Pt later reported that the PRN Oxycodone was effective in reducing his pain, however, he did require PRN Tylenol later in the day. Pt has been visible in the milieu throughout most of the shift today; he has been intermittently social with select peers. However, he appears to prefer socializing with staff and frequently seeks out staff for numerous, various requests. BG this morning was 143, and at lunch it was 139. Pt did not endorse any active SI/SIB urges or thoughts today. Although he frequently sought out staff, he was always calm, polite and appropriate in his interactions.    Problem: Adult Inpatient Plan of Care  Goal: Optimal Comfort and Wellbeing  Intervention: Monitor Pain and Promote Comfort  Recent Flowsheet Documentation  Taken 11/24/2023 2197 by Nohemi John, RN  Pain Management Interventions:   medication (see MAR)   emotional support     "

## 2023-11-24 NOTE — PLAN OF CARE
BEH IP Unit Acuity Rating Score (UARS)      Patient is given one point for every criteria they meet.     CRITERIA SCORING   On a 72 hour hold, court hold, committed, stay of commitment, or revocation 1    Patient LOS on BEH unit exceeds 20 days 0  LOS: 16   Patient under guardianship, 55+, otherwise medically complex, or under age 11 0   Suicide ideation without relief of precipitating factors 0   Current plan for suicide 0   Current plan for homicide 0   Imminent risk or actual attempt to seriously harm another without relief of factors precipitating the attempt 1   Severe dysfunction in daily living (ex: complete neglect for self care, extreme disruption in vegetative function, extreme deterioration in social interactions) 1   Recent (last 7 days) or current physical aggression in the ED or on unit 1   Restraints or seclusion episode in past 72 hours 0   Recent (last 7 days) or current verbal aggression, agitation, yelling, etc., while in the ED or unit 0   Active psychosis 1   Need for constant or near constant redirection (from leaving, from others, etc).  0   Intrusive or disruptive behaviors 0   TOTAL 5

## 2023-11-24 NOTE — PLAN OF CARE
Problem: Sleep Disturbance  Goal: Adequate Sleep/Rest  Outcome: Progressing      Patient alert and oriented x 4, Patient no complains of pain and discomfort so far. Patient appears calm, no behavioral issue or any safety concerns.  Will continue to monitor the patient and provide therapeutic intervention as needed. Will continue with current plan of care. Notify MD with any concerns. Patient had 6 total hours of sleep this shift.

## 2023-11-25 LAB
GLUCOSE BLDC GLUCOMTR-MCNC: 147 MG/DL (ref 70–99)
GLUCOSE BLDC GLUCOMTR-MCNC: 177 MG/DL (ref 70–99)

## 2023-11-25 PROCEDURE — 250N000013 HC RX MED GY IP 250 OP 250 PS 637: Performed by: STUDENT IN AN ORGANIZED HEALTH CARE EDUCATION/TRAINING PROGRAM

## 2023-11-25 PROCEDURE — A9270 NON-COVERED ITEM OR SERVICE: HCPCS

## 2023-11-25 PROCEDURE — 250N000013 HC RX MED GY IP 250 OP 250 PS 637

## 2023-11-25 PROCEDURE — 99232 SBSQ HOSP IP/OBS MODERATE 35: CPT | Mod: GC | Performed by: PSYCHIATRY & NEUROLOGY

## 2023-11-25 PROCEDURE — 250N000011 HC RX IP 250 OP 636

## 2023-11-25 PROCEDURE — 124N000002 HC R&B MH UMMC

## 2023-11-25 PROCEDURE — 250N000013 HC RX MED GY IP 250 OP 250 PS 637: Performed by: PHYSICIAN ASSISTANT

## 2023-11-25 RX ADMIN — PROPRANOLOL HYDROCHLORIDE 10 MG: 10 TABLET ORAL at 19:04

## 2023-11-25 RX ADMIN — AMLODIPINE BESYLATE 10 MG: 5 TABLET ORAL at 10:55

## 2023-11-25 RX ADMIN — DOCUSATE SODIUM 100 MG: 100 CAPSULE, LIQUID FILLED ORAL at 10:56

## 2023-11-25 RX ADMIN — NICOTINE POLACRILEX 4 MG: 4 GUM, CHEWING BUCCAL at 15:58

## 2023-11-25 RX ADMIN — DOCUSATE SODIUM 100 MG: 100 CAPSULE, LIQUID FILLED ORAL at 19:02

## 2023-11-25 RX ADMIN — PANTOPRAZOLE SODIUM 40 MG: 40 TABLET, DELAYED RELEASE ORAL at 10:56

## 2023-11-25 RX ADMIN — ROSUVASTATIN 40 MG: 20 TABLET, FILM COATED ORAL at 10:56

## 2023-11-25 RX ADMIN — Medication 3 MG: at 22:41

## 2023-11-25 RX ADMIN — ONDANSETRON 4 MG: 4 TABLET ORAL at 11:37

## 2023-11-25 RX ADMIN — NICOTINE 1 PATCH: 21 PATCH, EXTENDED RELEASE TRANSDERMAL at 10:55

## 2023-11-25 RX ADMIN — Medication 5 MG: at 10:55

## 2023-11-25 RX ADMIN — DOXYCYCLINE HYCLATE 100 MG: 100 CAPSULE ORAL at 10:56

## 2023-11-25 RX ADMIN — PROPRANOLOL HYDROCHLORIDE 10 MG: 10 TABLET ORAL at 15:57

## 2023-11-25 RX ADMIN — ACETAMINOPHEN 650 MG: 325 TABLET, FILM COATED ORAL at 16:16

## 2023-11-25 RX ADMIN — Medication 2.5 MG: at 19:04

## 2023-11-25 RX ADMIN — INSULIN GLARGINE 25 UNITS: 100 INJECTION, SOLUTION SUBCUTANEOUS at 10:56

## 2023-11-25 RX ADMIN — PROPRANOLOL HYDROCHLORIDE 10 MG: 10 TABLET ORAL at 10:56

## 2023-11-25 RX ADMIN — EMPAGLIFLOZIN 10 MG: 10 TABLET, FILM COATED ORAL at 10:55

## 2023-11-25 RX ADMIN — Medication 0.75 MG: at 10:55

## 2023-11-25 RX ADMIN — NICOTINE POLACRILEX 4 MG: 4 GUM, CHEWING BUCCAL at 17:06

## 2023-11-25 RX ADMIN — GABAPENTIN 1200 MG: 600 TABLET, FILM COATED ORAL at 19:02

## 2023-11-25 RX ADMIN — PROPRANOLOL HYDROCHLORIDE 10 MG: 10 TABLET ORAL at 13:12

## 2023-11-25 RX ADMIN — SULINDAC 200 MG: 200 TABLET ORAL at 16:16

## 2023-11-25 RX ADMIN — NICOTINE POLACRILEX 4 MG: 4 GUM, CHEWING BUCCAL at 11:46

## 2023-11-25 RX ADMIN — TESTOSTERONE 50 MG OF TESTOSTERONE: 50 GEL TRANSDERMAL at 10:56

## 2023-11-25 RX ADMIN — NICOTINE POLACRILEX 4 MG: 4 GUM, CHEWING BUCCAL at 10:57

## 2023-11-25 RX ADMIN — GABAPENTIN 1200 MG: 600 TABLET, FILM COATED ORAL at 15:57

## 2023-11-25 RX ADMIN — DOXYCYCLINE HYCLATE 100 MG: 100 CAPSULE ORAL at 19:02

## 2023-11-25 RX ADMIN — FLUPHENAZINE HYDROCHLORIDE 3 MG: 1 TABLET, FILM COATED ORAL at 16:43

## 2023-11-25 RX ADMIN — FLUPHENAZINE HYDROCHLORIDE 10 MG: 2.5 TABLET, FILM COATED ORAL at 10:56

## 2023-11-25 RX ADMIN — LITHIUM CARBONATE 900 MG: 450 TABLET, EXTENDED RELEASE ORAL at 20:57

## 2023-11-25 RX ADMIN — ATOMOXETINE 25 MG: 25 CAPSULE ORAL at 10:56

## 2023-11-25 RX ADMIN — GABAPENTIN 1200 MG: 600 TABLET, FILM COATED ORAL at 10:56

## 2023-11-25 RX ADMIN — POLYETHYLENE GLYCOL 3350 17 G: 17 POWDER, FOR SOLUTION ORAL at 10:55

## 2023-11-25 RX ADMIN — QUETIAPINE FUMARATE 200 MG: 200 TABLET ORAL at 20:57

## 2023-11-25 RX ADMIN — NICOTINE POLACRILEX 4 MG: 4 GUM, CHEWING BUCCAL at 14:48

## 2023-11-25 RX ADMIN — NICOTINE POLACRILEX 4 MG: 4 GUM, CHEWING BUCCAL at 18:35

## 2023-11-25 ASSESSMENT — ACTIVITIES OF DAILY LIVING (ADL)
LAUNDRY: WITH SUPERVISION
ADLS_ACUITY_SCORE: 42
ADLS_ACUITY_SCORE: 42
DRESS: INDEPENDENT
ADLS_ACUITY_SCORE: 42
ORAL_HYGIENE: INDEPENDENT
LAUNDRY: WITH SUPERVISION
DRESS: INDEPENDENT
ADLS_ACUITY_SCORE: 42
ORAL_HYGIENE: INDEPENDENT
ADLS_ACUITY_SCORE: 42
HYGIENE/GROOMING: INDEPENDENT
ADLS_ACUITY_SCORE: 42
HYGIENE/GROOMING: INDEPENDENT

## 2023-11-25 NOTE — PLAN OF CARE
"Pt slept in much later today than has been typical for him. Writer attemped to wake him earlier in the morning. He was initially not responsive to voice alone and writer had to lightly tap him on the arm. He allowed his vitals to be taken but then quickly fell back asleep and nodded \"yes\" when writer asked if he wanted to sleep longer. Upon waking, pt was irritable and directly stated that he was \"pissed off.\" He attributed this to his morning caffeinated coffee being discarded. He also acknowledged that he feels \"more tired/drowsy\" than usual. For the last 48 hours, pt has been taking 5mg Oxycodone (PRN) regularly (30mg total over the last 2 days). Pt stated he did not feel his sedation was d/t the Oxycodone, rather that he believes his \"depression is getting worse.\" Insight and judgement appeared to be limited and impaired during this conversation. However, later in the day, pt approached writer and acknowledged that he has been \"relying on the Oxycodone too much.\" Writer encouraged pt to utilize the Tylenol and Sulindac before requesting the Oxycodone, as well as taking the 2.5mg dose rather than the 5mg dose. He was agreeable to this. Level of insight and judgment appeared more appropriate to the situation. Despite his initial level of irritability and frustration, pt appeared to become calmer as the day progressed, and he always communicated with staff and peers in a polite, respectful manner.     Problem: Seclusion/Restraint, Behavioral  Goal: Seclusion/Behavioral Restraint Goal: Absence of Harm or Injury  Intervention: Implement Least Restrictive Safety Strategies  Recent Flowsheet Documentation  Taken 11/25/2023 1124 by Nohemi John, RN  Safety Measures: safety rounds completed     "

## 2023-11-25 NOTE — PLAN OF CARE
Problem: Adult Inpatient Plan of Care  Goal: Plan of Care Review  Description: The Plan of Care Review/Shift note should be completed every shift.  The Outcome Evaluation is a brief statement about your assessment that the patient is improving, declining, or no change.  This information will be displayed automatically on your shift  note.  Outcome: Progressing     Problem: Anxiety  Goal: Anxiety Reduction or Resolution  Outcome: Progressing     Problem: Psychotic Signs/Symptoms  Goal: Improved Behavioral Control (Psychotic Signs/Symptoms)  Outcome: Progressing     Problem: Pain Acute  Goal: Optimal Pain Control and Function  Outcome: Progressing   Goal Outcome Evaluation:    Plan of Care Reviewed With: patient      Pt was visible in the milieu, intermittently seen in his room, C/O anxiety /depression 7/10, stated it was triggered by what happened in the unit the previous shift. Pt was given scheduled Propanolol and Gabapentin, after an hour pt stated the anxiety was increasing and requested for Prolixin, pt verbalized it was effective, endorsed A/hallucination which were commanding to hurt someone but he was able to be distracted by watching television with peers, contracted for safety in the unit, denied all other psyche issues. Pt frequently seeks staff for medication and other requests. Pt stated he wants to go home because he thinks he's feeling better, was referred to talk to the teams on Monday and was agreeable. C/O pain on the left arm PRN Tylenol and Sulindac were administered and pt verbalized it was not effective. Later PRN Oxycodone 2.5 mg with 1900 medication was given and pt verbalized it was effective Pt is complaint with medication, adequate PO intake. No safety concerns or behavior issues observed/noted.    PRNs Nicotine, Prolixin, Oxycodone, Tylenol, Sulindac and Melatonin                S; comfortable with epidural    O: /64  Temp 98.4  F (36.9  C) (Oral)  Resp 18  LMP 01/21/2018  SpO2 95%    FHT: 130, +accels, occasional variable decel with ctx, mod susan  [place]: Q3-5min  SVE: 6/80/-1 with SROM (clear fluid) per RN exam    Pit: 10mu/min    A/P; IUP at 41w1d, IOL   FWBR overall, cat 2 tracing due to variable decels.  Will monitor closely.   Cont pit IOL    SHERRIE BARRON MD

## 2023-11-25 NOTE — PLAN OF CARE
No Psyche symptoms observed. Patient no complains of pain and discomfort so far. Patient appears calm, no behavioral issue or any safety concerns.  Will continue to monitor the patient and provide therapeutic intervention as needed. Will continue with current plan of care. Notify MD with any concerns. Patient had 6.75 total hours of sleep this shift.     Problem: Sleep Disturbance  Goal: Adequate Sleep/Rest  Outcome: Progressing
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
## 2023-11-26 LAB
GLUCOSE BLDC GLUCOMTR-MCNC: 130 MG/DL (ref 70–99)
GLUCOSE BLDC GLUCOMTR-MCNC: 155 MG/DL (ref 70–99)
GLUCOSE BLDC GLUCOMTR-MCNC: 179 MG/DL (ref 70–99)

## 2023-11-26 PROCEDURE — 250N000013 HC RX MED GY IP 250 OP 250 PS 637

## 2023-11-26 PROCEDURE — A9270 NON-COVERED ITEM OR SERVICE: HCPCS

## 2023-11-26 PROCEDURE — 250N000013 HC RX MED GY IP 250 OP 250 PS 637: Performed by: PHYSICIAN ASSISTANT

## 2023-11-26 PROCEDURE — 124N000002 HC R&B MH UMMC

## 2023-11-26 PROCEDURE — 250N000013 HC RX MED GY IP 250 OP 250 PS 637: Performed by: STUDENT IN AN ORGANIZED HEALTH CARE EDUCATION/TRAINING PROGRAM

## 2023-11-26 RX ADMIN — NICOTINE POLACRILEX 4 MG: 4 GUM, CHEWING BUCCAL at 15:55

## 2023-11-26 RX ADMIN — DOXYCYCLINE HYCLATE 100 MG: 100 CAPSULE ORAL at 08:00

## 2023-11-26 RX ADMIN — PROPRANOLOL HYDROCHLORIDE 10 MG: 10 TABLET ORAL at 14:02

## 2023-11-26 RX ADMIN — GABAPENTIN 1200 MG: 600 TABLET, FILM COATED ORAL at 19:30

## 2023-11-26 RX ADMIN — NICOTINE POLACRILEX 4 MG: 4 GUM, CHEWING BUCCAL at 17:06

## 2023-11-26 RX ADMIN — ATOMOXETINE 25 MG: 25 CAPSULE ORAL at 08:00

## 2023-11-26 RX ADMIN — Medication 5 MG: at 14:02

## 2023-11-26 RX ADMIN — ACETAMINOPHEN 650 MG: 325 TABLET, FILM COATED ORAL at 05:31

## 2023-11-26 RX ADMIN — TESTOSTERONE 50 MG OF TESTOSTERONE: 50 GEL TRANSDERMAL at 07:59

## 2023-11-26 RX ADMIN — DOXYCYCLINE HYCLATE 100 MG: 100 CAPSULE ORAL at 19:31

## 2023-11-26 RX ADMIN — ACETAMINOPHEN 650 MG: 325 TABLET, FILM COATED ORAL at 20:18

## 2023-11-26 RX ADMIN — DOCUSATE SODIUM 100 MG: 100 CAPSULE, LIQUID FILLED ORAL at 19:31

## 2023-11-26 RX ADMIN — NICOTINE POLACRILEX 4 MG: 4 GUM, CHEWING BUCCAL at 19:30

## 2023-11-26 RX ADMIN — LITHIUM CARBONATE 900 MG: 450 TABLET, EXTENDED RELEASE ORAL at 20:18

## 2023-11-26 RX ADMIN — Medication 5 MG: at 06:05

## 2023-11-26 RX ADMIN — PROPRANOLOL HYDROCHLORIDE 10 MG: 10 TABLET ORAL at 08:00

## 2023-11-26 RX ADMIN — DOCUSATE SODIUM 100 MG: 100 CAPSULE, LIQUID FILLED ORAL at 08:00

## 2023-11-26 RX ADMIN — NICOTINE 1 PATCH: 21 PATCH, EXTENDED RELEASE TRANSDERMAL at 08:00

## 2023-11-26 RX ADMIN — NICOTINE POLACRILEX 4 MG: 4 GUM, CHEWING BUCCAL at 09:42

## 2023-11-26 RX ADMIN — SULINDAC 200 MG: 200 TABLET ORAL at 20:19

## 2023-11-26 RX ADMIN — NICOTINE POLACRILEX 4 MG: 4 GUM, CHEWING BUCCAL at 22:22

## 2023-11-26 RX ADMIN — POLYETHYLENE GLYCOL 3350 17 G: 17 POWDER, FOR SOLUTION ORAL at 07:59

## 2023-11-26 RX ADMIN — GABAPENTIN 1200 MG: 600 TABLET, FILM COATED ORAL at 14:02

## 2023-11-26 RX ADMIN — GABAPENTIN 1200 MG: 600 TABLET, FILM COATED ORAL at 07:59

## 2023-11-26 RX ADMIN — NICOTINE POLACRILEX 4 MG: 4 GUM, CHEWING BUCCAL at 20:20

## 2023-11-26 RX ADMIN — PANTOPRAZOLE SODIUM 40 MG: 40 TABLET, DELAYED RELEASE ORAL at 05:31

## 2023-11-26 RX ADMIN — EMPAGLIFLOZIN 10 MG: 10 TABLET, FILM COATED ORAL at 08:00

## 2023-11-26 RX ADMIN — CLONAZEPAM 0.5 MG: 0.5 TABLET ORAL at 08:00

## 2023-11-26 RX ADMIN — NICOTINE POLACRILEX 4 MG: 4 GUM, CHEWING BUCCAL at 14:45

## 2023-11-26 RX ADMIN — INSULIN GLARGINE 25 UNITS: 100 INJECTION, SOLUTION SUBCUTANEOUS at 08:27

## 2023-11-26 RX ADMIN — AMLODIPINE BESYLATE 10 MG: 5 TABLET ORAL at 08:00

## 2023-11-26 RX ADMIN — FLUPHENAZINE HYDROCHLORIDE 10 MG: 2.5 TABLET, FILM COATED ORAL at 08:00

## 2023-11-26 RX ADMIN — QUETIAPINE FUMARATE 200 MG: 200 TABLET ORAL at 20:34

## 2023-11-26 RX ADMIN — NICOTINE POLACRILEX 4 MG: 4 GUM, CHEWING BUCCAL at 06:05

## 2023-11-26 RX ADMIN — ROSUVASTATIN 40 MG: 20 TABLET, FILM COATED ORAL at 08:00

## 2023-11-26 RX ADMIN — NICOTINE POLACRILEX 4 MG: 4 GUM, CHEWING BUCCAL at 07:04

## 2023-11-26 RX ADMIN — TRETINOIN: 0.5 CREAM TOPICAL at 22:00

## 2023-11-26 RX ADMIN — Medication 2.5 MG: at 22:00

## 2023-11-26 RX ADMIN — PROPRANOLOL HYDROCHLORIDE 10 MG: 10 TABLET ORAL at 19:30

## 2023-11-26 RX ADMIN — NICOTINE POLACRILEX 4 MG: 4 GUM, CHEWING BUCCAL at 18:18

## 2023-11-26 RX ADMIN — NICOTINE POLACRILEX 4 MG: 4 GUM, CHEWING BUCCAL at 08:30

## 2023-11-26 ASSESSMENT — ACTIVITIES OF DAILY LIVING (ADL)
HYGIENE/GROOMING: INDEPENDENT
ORAL_HYGIENE: INDEPENDENT
ADLS_ACUITY_SCORE: 42
LAUNDRY: WITH SUPERVISION
HYGIENE/GROOMING: INDEPENDENT
ORAL_HYGIENE: INDEPENDENT
ADLS_ACUITY_SCORE: 42
DRESS: INDEPENDENT;STREET CLOTHES
DRESS: INDEPENDENT;STREET CLOTHES
ADLS_ACUITY_SCORE: 42
LAUNDRY: WITH SUPERVISION
ADLS_ACUITY_SCORE: 42

## 2023-11-26 NOTE — PLAN OF CARE
"Pt was awake in the lounge this morning upon the start of the shift. He greeted writer and other staff in a bright, friendly manner. Pt appears and reports greatly reduced anxiety symptoms compared to yesterday. Pt reported that the intensity/acuity of yesterday's milieu was highly stressful for him. Pt still reporting ongoing drowsiness/fatigue today; despite being awake around 0700 today, he has been napping off and on throughout the morning and early afternoon. Overall affect appears full range with calm, pleasant mood. He continues to frequently seek out staff with many requests, including access to staff's personal food items. However, he easily accepts redirection about appropriate requests and boundaries. Pt reported he had a \"good\" bowel movement this morning and felt relief from it. No safety or behavioral concerns today.    Problem: Seclusion/Restraint, Behavioral  Goal: Seclusion/Behavioral Restraint Goal: Absence of Harm or Injury  Intervention: Implement Least Restrictive Safety Strategies  Recent Flowsheet Documentation  Taken 11/26/2023 1046 by Nohemi John, RN  Safety Measures: safety rounds completed     "

## 2023-11-26 NOTE — PLAN OF CARE
"  Problem: Adult Inpatient Plan of Care  Goal: Optimal Comfort and Wellbeing  Intervention: Monitor Pain and Promote Comfort  Recent Flowsheet Documentation  Taken 11/26/2023 0606 by Dashawn Salvador RN  Pain Management Interventions: medication (see MAR)  Taken 11/26/2023 0536 by Dashawn Salvador RN  Pain Management Interventions: medication (see MAR)     Problem: Pain Acute  Goal: Optimal Pain Control and Function  Intervention: Develop Pain Management Plan  Recent Flowsheet Documentation  Taken 11/26/2023 0606 by Dashawn Salvador RN  Pain Management Interventions: medication (see MAR)  Taken 11/26/2023 0536 by Dashawn Salvador RN  Pain Management Interventions: medication (see MAR)     Problem: Sleep Disturbance  Goal: Adequate Sleep/Rest  Outcome: Progressing    Patient alert and oriented x 4. Patient complained of headache and L hand pain-medicated with Tylenol and Oxycodone. Patient had Nicotine gum this morning. Patient appears to have slept for 6 hours. Patient VS   Blood pressure 118/74, pulse 86, temperature 97.4  F (36.3  C), temperature source Temporal, resp. rate 18, height 1.702 m (5' 7\"), weight 106.8 kg (235 lb 6.4 oz), SpO2 97%, on RA. No BP PRN medication needed. Patient paced the hallway. Patient calm, no behavioral issue or any safety concerns at this time.  Will continue to monitor the patient and provide therapeutic intervention as needed. Will continue with current plan of care. Notify MD with any concerns. Patient had 6 total hours of sleep this shift.     "

## 2023-11-27 PROBLEM — L70.0 ACNE VULGARIS: Chronic | Status: ACTIVE | Noted: 2023-11-27

## 2023-11-27 LAB
GLUCOSE BLDC GLUCOMTR-MCNC: 120 MG/DL (ref 70–99)
GLUCOSE BLDC GLUCOMTR-MCNC: 122 MG/DL (ref 70–99)
GLUCOSE BLDC GLUCOMTR-MCNC: 178 MG/DL (ref 70–99)

## 2023-11-27 PROCEDURE — 250N000011 HC RX IP 250 OP 636

## 2023-11-27 PROCEDURE — 250N000013 HC RX MED GY IP 250 OP 250 PS 637: Performed by: STUDENT IN AN ORGANIZED HEALTH CARE EDUCATION/TRAINING PROGRAM

## 2023-11-27 PROCEDURE — 250N000013 HC RX MED GY IP 250 OP 250 PS 637

## 2023-11-27 PROCEDURE — 124N000002 HC R&B MH UMMC

## 2023-11-27 PROCEDURE — A9270 NON-COVERED ITEM OR SERVICE: HCPCS

## 2023-11-27 PROCEDURE — 250N000013 HC RX MED GY IP 250 OP 250 PS 637: Performed by: PHYSICIAN ASSISTANT

## 2023-11-27 PROCEDURE — 90837 PSYTX W PT 60 MINUTES: CPT

## 2023-11-27 PROCEDURE — 99232 SBSQ HOSP IP/OBS MODERATE 35: CPT | Mod: GC | Performed by: STUDENT IN AN ORGANIZED HEALTH CARE EDUCATION/TRAINING PROGRAM

## 2023-11-27 RX ORDER — QUETIAPINE FUMARATE 200 MG/1
200 TABLET, FILM COATED ORAL AT BEDTIME
Qty: 30 TABLET | Refills: 0 | Status: SHIPPED | OUTPATIENT
Start: 2023-11-27 | End: 2023-12-08

## 2023-11-27 RX ORDER — PANTOPRAZOLE SODIUM 40 MG/1
40 TABLET, DELAYED RELEASE ORAL DAILY
Qty: 30 TABLET | Refills: 0 | Status: SHIPPED | OUTPATIENT
Start: 2023-11-28 | End: 2023-12-28

## 2023-11-27 RX ORDER — TRETINOIN 0.5 MG/G
CREAM TOPICAL
Qty: 20 G | Refills: 0 | Status: SHIPPED | OUTPATIENT
Start: 2023-11-27 | End: 2023-12-11 | Stop reason: ALTCHOICE

## 2023-11-27 RX ORDER — GABAPENTIN 600 MG/1
1200 TABLET ORAL 3 TIMES DAILY
Qty: 180 TABLET | Refills: 0 | Status: SHIPPED | OUTPATIENT
Start: 2023-11-27 | End: 2024-01-25

## 2023-11-27 RX ORDER — SULINDAC 200 MG/1
200 TABLET ORAL 2 TIMES DAILY PRN
Qty: 60 TABLET | Refills: 0 | Status: SHIPPED | OUTPATIENT
Start: 2023-11-27 | End: 2024-03-08

## 2023-11-27 RX ORDER — PROPRANOLOL HYDROCHLORIDE 10 MG/1
10 TABLET ORAL 3 TIMES DAILY
Qty: 90 TABLET | Refills: 0 | Status: SHIPPED | OUTPATIENT
Start: 2023-11-27 | End: 2023-12-08

## 2023-11-27 RX ORDER — FLUPHENAZINE HYDROCHLORIDE 1 MG/1
1 TABLET ORAL 2 TIMES DAILY PRN
Qty: 60 TABLET | Refills: 0 | Status: SHIPPED | OUTPATIENT
Start: 2023-11-27 | End: 2023-12-01

## 2023-11-27 RX ORDER — LITHIUM CARBONATE 450 MG
900 TABLET, EXTENDED RELEASE ORAL AT BEDTIME
Qty: 60 TABLET | Refills: 0 | Status: SHIPPED | OUTPATIENT
Start: 2023-11-27 | End: 2023-12-08

## 2023-11-27 RX ORDER — FLUPHENAZINE HYDROCHLORIDE 1 MG/1
1 TABLET ORAL 2 TIMES DAILY PRN
Status: DISCONTINUED | OUTPATIENT
Start: 2023-11-27 | End: 2023-11-29 | Stop reason: HOSPADM

## 2023-11-27 RX ORDER — FLUPHENAZINE HYDROCHLORIDE 5 MG/1
5 TABLET ORAL DAILY
Qty: 30 TABLET | Refills: 0 | Status: SHIPPED | OUTPATIENT
Start: 2023-11-28 | End: 2023-12-08

## 2023-11-27 RX ORDER — FLUPHENAZINE HYDROCHLORIDE 2.5 MG/1
5 TABLET ORAL DAILY
Status: COMPLETED | OUTPATIENT
Start: 2023-11-28 | End: 2023-11-28

## 2023-11-27 RX ORDER — FLUPHENAZINE DECANOATE 25 MG/ML
12.5 INJECTION, SOLUTION INTRAMUSCULAR; SUBCUTANEOUS
Status: DISCONTINUED | OUTPATIENT
Start: 2023-11-27 | End: 2023-11-29 | Stop reason: HOSPADM

## 2023-11-27 RX ORDER — FLUPHENAZINE DECANOATE 25 MG/ML
12.5 INJECTION, SOLUTION INTRAMUSCULAR; SUBCUTANEOUS
Start: 2023-12-11 | End: 2023-11-29

## 2023-11-27 RX ORDER — DOXYCYCLINE 100 MG/1
100 CAPSULE ORAL EVERY 12 HOURS
Qty: 60 CAPSULE | Refills: 0 | Status: SHIPPED | OUTPATIENT
Start: 2023-11-27 | End: 2023-12-11

## 2023-11-27 RX ORDER — ATOMOXETINE 40 MG/1
40 CAPSULE ORAL DAILY
Status: DISCONTINUED | OUTPATIENT
Start: 2023-11-27 | End: 2023-11-29 | Stop reason: HOSPADM

## 2023-11-27 RX ORDER — ATOMOXETINE 40 MG/1
40 CAPSULE ORAL DAILY
Qty: 30 CAPSULE | Refills: 0 | Status: SHIPPED | OUTPATIENT
Start: 2023-11-28 | End: 2023-12-01

## 2023-11-27 RX ADMIN — NICOTINE 1 PATCH: 21 PATCH, EXTENDED RELEASE TRANSDERMAL at 08:49

## 2023-11-27 RX ADMIN — FLUPHENAZINE HYDROCHLORIDE 10 MG: 2.5 TABLET, FILM COATED ORAL at 08:46

## 2023-11-27 RX ADMIN — GABAPENTIN 1200 MG: 600 TABLET, FILM COATED ORAL at 20:23

## 2023-11-27 RX ADMIN — Medication 5 MG: at 08:50

## 2023-11-27 RX ADMIN — Medication 5 MG: at 16:04

## 2023-11-27 RX ADMIN — NICOTINE POLACRILEX 4 MG: 4 GUM, CHEWING BUCCAL at 08:50

## 2023-11-27 RX ADMIN — TESTOSTERONE 50 MG OF TESTOSTERONE: 50 GEL TRANSDERMAL at 08:46

## 2023-11-27 RX ADMIN — ACETAMINOPHEN 650 MG: 325 TABLET, FILM COATED ORAL at 14:25

## 2023-11-27 RX ADMIN — QUETIAPINE FUMARATE 200 MG: 200 TABLET ORAL at 20:25

## 2023-11-27 RX ADMIN — GABAPENTIN 1200 MG: 600 TABLET, FILM COATED ORAL at 08:48

## 2023-11-27 RX ADMIN — NICOTINE POLACRILEX 4 MG: 4 GUM, CHEWING BUCCAL at 14:26

## 2023-11-27 RX ADMIN — LITHIUM CARBONATE 900 MG: 450 TABLET, EXTENDED RELEASE ORAL at 20:23

## 2023-11-27 RX ADMIN — POLYETHYLENE GLYCOL 3350 17 G: 17 POWDER, FOR SOLUTION ORAL at 08:48

## 2023-11-27 RX ADMIN — GABAPENTIN 1200 MG: 600 TABLET, FILM COATED ORAL at 14:25

## 2023-11-27 RX ADMIN — ACETAMINOPHEN 650 MG: 325 TABLET, FILM COATED ORAL at 20:29

## 2023-11-27 RX ADMIN — FLUPHENAZINE DECANOATE 12.5 MG: 25 INJECTION, SOLUTION INTRAMUSCULAR; SUBCUTANEOUS at 11:32

## 2023-11-27 RX ADMIN — AMLODIPINE BESYLATE 10 MG: 5 TABLET ORAL at 08:48

## 2023-11-27 RX ADMIN — NICOTINE POLACRILEX 4 MG: 4 GUM, CHEWING BUCCAL at 12:35

## 2023-11-27 RX ADMIN — ROSUVASTATIN 40 MG: 20 TABLET, FILM COATED ORAL at 08:47

## 2023-11-27 RX ADMIN — PANTOPRAZOLE SODIUM 40 MG: 40 TABLET, DELAYED RELEASE ORAL at 08:47

## 2023-11-27 RX ADMIN — DOCUSATE SODIUM 100 MG: 100 CAPSULE, LIQUID FILLED ORAL at 08:46

## 2023-11-27 RX ADMIN — NICOTINE POLACRILEX 4 MG: 4 GUM, CHEWING BUCCAL at 10:19

## 2023-11-27 RX ADMIN — NICOTINE POLACRILEX 4 MG: 4 GUM, CHEWING BUCCAL at 17:44

## 2023-11-27 RX ADMIN — EMPAGLIFLOZIN 10 MG: 10 TABLET, FILM COATED ORAL at 08:47

## 2023-11-27 RX ADMIN — SULINDAC 200 MG: 200 TABLET ORAL at 14:25

## 2023-11-27 RX ADMIN — DOXYCYCLINE HYCLATE 100 MG: 100 CAPSULE ORAL at 08:48

## 2023-11-27 RX ADMIN — DOXYCYCLINE HYCLATE 100 MG: 100 CAPSULE ORAL at 20:23

## 2023-11-27 RX ADMIN — DOCUSATE SODIUM 100 MG: 100 CAPSULE, LIQUID FILLED ORAL at 20:23

## 2023-11-27 RX ADMIN — CLONAZEPAM 0.5 MG: 0.5 TABLET ORAL at 08:48

## 2023-11-27 RX ADMIN — NICOTINE POLACRILEX 4 MG: 4 GUM, CHEWING BUCCAL at 18:58

## 2023-11-27 RX ADMIN — SULINDAC 200 MG: 200 TABLET ORAL at 14:26

## 2023-11-27 RX ADMIN — ATOMOXETINE HYDROCHLORIDE 40 MG: 40 CAPSULE ORAL at 10:57

## 2023-11-27 RX ADMIN — NICOTINE POLACRILEX 4 MG: 4 GUM, CHEWING BUCCAL at 16:04

## 2023-11-27 RX ADMIN — INSULIN GLARGINE 25 UNITS: 100 INJECTION, SOLUTION SUBCUTANEOUS at 08:49

## 2023-11-27 RX ADMIN — PROPRANOLOL HYDROCHLORIDE 10 MG: 10 TABLET ORAL at 14:25

## 2023-11-27 RX ADMIN — PROPRANOLOL HYDROCHLORIDE 10 MG: 10 TABLET ORAL at 08:48

## 2023-11-27 ASSESSMENT — ACTIVITIES OF DAILY LIVING (ADL)
ADLS_ACUITY_SCORE: 42
LAUNDRY: WITH SUPERVISION
ORAL_HYGIENE: INDEPENDENT
ADLS_ACUITY_SCORE: 42
ADLS_ACUITY_SCORE: 42
HYGIENE/GROOMING: INDEPENDENT
ADLS_ACUITY_SCORE: 42
DRESS: INDEPENDENT
ADLS_ACUITY_SCORE: 42
ADLS_ACUITY_SCORE: 42
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
## 2023-11-27 NOTE — PLAN OF CARE
Problem: Psychotic Signs/Symptoms  Goal: Improved Mood Symptoms (Psychotic Signs/Symptoms)  Outcome: Progressing     Problem: Psychotic Signs/Symptoms  Goal: Improved Behavioral Control (Psychotic Signs/Symptoms)  Outcome: Progressing   Goal Outcome Evaluation:  Patient out and visible in milieu, frequently seeks out staffs with multiple requests, changes made to medications, Strattera dose increase to 40 mg from 25 mg, patient received DOMINGUEZ Prolixin 12.5 mg and oral dose decreased from 10 mg to 5 mg daily. Patient is in agreement with current plan of care, complained of pain in left hand rating at 9/10, PRN Oxycodone 5 mg given with some relief. Patient socially interactive with peers, participating in group activities continue to endorse minimal auditory hallucinations, he denied all other mental health symptoms, plan is for patient to discharge mid week, patient verbalized feeling hopeful for pending discharge. Blood sugar this shift was 178 and 122, patient has adequate intake of food and fluids, appeared clean and well groom. At 14:30 patient reported pain in left upper abdomen, requesting that writer called the Doctor, Provider was paged and updated, advise to monitor an report any GI symptoms. Patient took PRN Tylenol and Sulindac at 14:45, continue to monitor.

## 2023-11-27 NOTE — PLAN OF CARE
Assessment/Intervention/Current Symtoms and Care Coordination:  Chart reviewed and patient met with team,   Discussed patient progress, symptomology, and response to treatment.  Discussed the discharge plan and any potential impediments to discharge.     Patient met with team. Reports feeling better overall though intermittent depression over the holiday weekend.  Patient interviewed with [facility] and found out that bringing therapy dog is no longer an option.  Patient would now like to return to current GH while CADI/CM continue to seek alternate GH placement.  Also, patient' is concerned that CADI waiver will close in the next few days if not out of the hospital.  Patient states he feels safe/stable to discharge in the next few days.  Writer spoke to patient's CM who although is concerned about current placement, agrees to this plan.     Writer has attempted several times to reach CADI CM and VM has been fulll.  CM will contact her supervisor as she also has attempted to reach her.  CTC will work on outpatient appts and draw up PD.  .     Discharge Plan or Goal:  GH Placement  Psychiatry  Therapy  IOP  CM/ACT team     Barriers to Discharge:  Coordination with outpatient providers - likley discharge in next few days.     Referral Status:  IOP intake completed 11/10/23  Referral will be made for individual therapist     Legal Status:  Committed- PD revoked 10/31/23     [facility]: [place]  File Number:  [id number]  Start and expiration date of commitment:   6/9/23 - 12/9/23?     Contacts:  Outpatient Psychiatrist: Regine Last CNP  Psychiatry  Primary Physician: KELIN Edwards  [facility] : Cristy Ji [phone]  CADI CM:Pretty Guzman,:[phone]  Family Members: Negra Prasad ([phone])     Upcoming Meetings and Dates/Important Information and next steps:  Patient has ACT team intake 11/29/23 at 3:00pm  Patient will need PD when discharged.     Team Update:   Wed

## 2023-11-27 NOTE — CARE PLAN
11/26/23 2035   Group Therapy Session   Group Attendance attended group session   Time Session Began 2000   Time Session Ended 2030   Total Time (minutes) 15   Total # Attendees 4   Group Type psychotherapeutic   Group Topic Covered coping skills/lifestyle management;emotions/expression   Group Session Detail CTC-Group members completed/discussed a worksheet on therapy goals and safety planning including knowing when to get help, coping skills to use, and social support they can reach out to.   Patient Response/Contribution cooperative with task   Patient Participation Detail Patient presented to group was pleasant, engaged, and checked in just chilling.

## 2023-11-27 NOTE — PROGRESS NOTES
Occupational Therapy Encounter Note:    Patient entered the 11:15 OT group late. Patient was the sole participant for the remainder of group time. Patient verbalized desire to work on coloring task; writer approved request. Patient spent roughly 20 minutes coloring while socializing with writer. Patient spoke about nearing discharge and hopes for the future. Patient verbalized he is most excited to see his dog, Nugget. Pleasant and social during this time. No charge.
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
## 2023-11-27 NOTE — CARE PLAN
"Occupational Therapy Group Note:     11/27/23 1549   Group Therapy Session   Group Attendance attended group session   Time Session Began 1015   Time Session Ended 1105   Total Time (minutes) 35 (no charge)   Total # Attendees 6   Group Type task skill   Group Topic Covered coping skills/lifestyle management;emotions/expression;leisure exploration/use of leisure time;structured socialization   Group Session Detail OT Task Group: Sand Art   Patient Response/Contribution listened actively;confronted peers appropriately   Patient Participation Detail Patient engaged in a meaningful and creative hands-on sand art project. This activity allows for creative expression, positive distraction and coping with symptoms, and mood stabilization. In addition, this activity promotes cognitive functioning by addressing: focus and attention, attention to detail, new learning, direction following, frustration tolerance, follow-through, and simple problem solving. Patient, initially, appeared willing and motivated to attempt engagement in task. However, it quickly became evident of increased difficulty with task due to cast placement on patient's left hand. Patient was able to ask for assistance as needed; however, seemed to have impaired frustration tolerance and patience throughout task and bluntly stated, \"I give up.\" Patient ended up engaging in a familiar 1 step coloring task for remainder of group. Patient demonstrated future-oriented thinking and appeared excited for nearing planned discharge. Patient seemed to forget, at times, what he had already communicated and shared with writer and repeated himself multiple times in group. Overall, calm and pleasant demeanor in group. Intermittently social with peers.          "

## 2023-11-27 NOTE — PLAN OF CARE

## 2023-11-27 NOTE — PLAN OF CARE
Problem: Sleep Disturbance  Goal: Adequate Sleep/Rest  Outcome: Progressing   Patient appears sleeping, resting in bed. Patient no complains of pain na discomfort. Patient had 7 hours of sleep. Will continue with current plan of care. Notify MD with any concerns.

## 2023-11-27 NOTE — PLAN OF CARE
Problem: Adult Inpatient Plan of Care  Goal: Plan of Care Review  Description: The Plan of Care Review/Shift note should be completed every shift.  The Outcome Evaluation is a brief statement about your assessment that the patient is improving, declining, or no change.  This information will be displayed automatically on your shift  note.  Outcome: Progressing     Problem: Anxiety  Goal: Anxiety Reduction or Resolution  Outcome: Progressing     Problem: Psychotic Signs/Symptoms  Goal: Improved Behavioral Control (Psychotic Signs/Symptoms)  Outcome: Progressing     Problem: Pain Acute  Goal: Optimal Pain Control and Function  Outcome: Progressing   Goal Outcome Evaluation:    Plan of Care Reviewed With: patient      Pt is visible in the milieu, alert and oriented and able to make needs known. Seeks staff frequently for medications, C/O pain on the left arm rated 7/10, denied intervention at the moment and stated he will need it later. Endorsed A/Hallucination that are getting better, depression minimal, denied SI, SIB, HI, denied anxiety and all other psyche issues. Pt contracted for safety in the unit. Pt is more awake, engaging and bright on approach compared to yesterday, watched television with peers and was observed talking more than yesterday. Pt took Tylenol and Sulindac for pain later, at 2200 came for Oxycodone 2.5 mg, Nicotine gum was utilized at least every hr. Pt is compliant with medication, took a shower this shift, adequate PO intake, no safety concerns or behavior issues noted.

## 2023-11-27 NOTE — PLAN OF CARE
Goal Outcome Evaluation:    Plan of Care Reviewed With: patient      Problem: Adult Inpatient Plan of Care  Goal: Optimal Comfort and Wellbeing  Outcome: Progressing  Intervention: Monitor Pain and Promote Comfort  Recent Flowsheet Documentation  Taken 11/27/2023 1604 by Stefania Castro RN  Pain Management Interventions: medication (see MAR)     Patient visible on the unit watching television. Affect appears bright and pleasant. Patient seen socially interacting with peers on the unit. Patient denied anxiety and depression. He denied SI/HI/SIB. Patient endorsed mild auditory hallucination stating that  he could not make out what they are saying. Patient complained of pain in his left hand and requested for PRN Oxycodone. Same administered at 1604 with good effect. No behavior concerns noted.   Patient received PRN Tylenol at 2029 for pain on the left upper abdomen. Patient did not receive night dose of Propranolol due to BP 88/48. Patient denied dizziness. Patient maintains good appetite and hygiene was appropriate.

## 2023-11-28 LAB
GLUCOSE BLDC GLUCOMTR-MCNC: 166 MG/DL (ref 70–99)
GLUCOSE BLDC GLUCOMTR-MCNC: 171 MG/DL (ref 70–99)
GLUCOSE BLDC GLUCOMTR-MCNC: 227 MG/DL (ref 70–99)

## 2023-11-28 PROCEDURE — 250N000013 HC RX MED GY IP 250 OP 250 PS 637

## 2023-11-28 PROCEDURE — 124N000002 HC R&B MH UMMC

## 2023-11-28 PROCEDURE — 90832 PSYTX W PT 30 MINUTES: CPT

## 2023-11-28 PROCEDURE — 250N000013 HC RX MED GY IP 250 OP 250 PS 637: Performed by: STUDENT IN AN ORGANIZED HEALTH CARE EDUCATION/TRAINING PROGRAM

## 2023-11-28 PROCEDURE — A9270 NON-COVERED ITEM OR SERVICE: HCPCS

## 2023-11-28 PROCEDURE — 99232 SBSQ HOSP IP/OBS MODERATE 35: CPT | Performed by: STUDENT IN AN ORGANIZED HEALTH CARE EDUCATION/TRAINING PROGRAM

## 2023-11-28 PROCEDURE — 250N000013 HC RX MED GY IP 250 OP 250 PS 637: Performed by: PHYSICIAN ASSISTANT

## 2023-11-28 RX ORDER — CLONAZEPAM 0.5 MG/1
0.25 TABLET ORAL DAILY
Qty: 1 TABLET | Refills: 0 | Status: SHIPPED | OUTPATIENT
Start: 2023-11-30 | End: 2023-12-01

## 2023-11-28 RX ORDER — CLONAZEPAM 0.5 MG/1
0.25 TABLET ORAL DAILY
Status: DISCONTINUED | OUTPATIENT
Start: 2023-11-29 | End: 2023-11-29 | Stop reason: HOSPADM

## 2023-11-28 RX ORDER — TESTOSTERONE GEL, 1% 10 MG/G
50 GEL TRANSDERMAL DAILY
Qty: 30 PACKET | Refills: 0 | Status: SHIPPED | OUTPATIENT
Start: 2023-11-28 | End: 2023-12-19

## 2023-11-28 RX ADMIN — NICOTINE 1 PATCH: 21 PATCH, EXTENDED RELEASE TRANSDERMAL at 08:50

## 2023-11-28 RX ADMIN — ROSUVASTATIN 40 MG: 20 TABLET, FILM COATED ORAL at 08:50

## 2023-11-28 RX ADMIN — TESTOSTERONE 50 MG OF TESTOSTERONE: 50 GEL TRANSDERMAL at 08:50

## 2023-11-28 RX ADMIN — NICOTINE POLACRILEX 4 MG: 4 GUM, CHEWING BUCCAL at 16:32

## 2023-11-28 RX ADMIN — ATOMOXETINE HYDROCHLORIDE 40 MG: 40 CAPSULE ORAL at 08:51

## 2023-11-28 RX ADMIN — INSULIN GLARGINE 25 UNITS: 100 INJECTION, SOLUTION SUBCUTANEOUS at 08:49

## 2023-11-28 RX ADMIN — NICOTINE POLACRILEX 4 MG: 4 GUM, CHEWING BUCCAL at 18:48

## 2023-11-28 RX ADMIN — NICOTINE POLACRILEX 4 MG: 4 GUM, CHEWING BUCCAL at 19:58

## 2023-11-28 RX ADMIN — LITHIUM CARBONATE 900 MG: 450 TABLET, EXTENDED RELEASE ORAL at 20:00

## 2023-11-28 RX ADMIN — NICOTINE POLACRILEX 4 MG: 4 GUM, CHEWING BUCCAL at 11:26

## 2023-11-28 RX ADMIN — DOXYCYCLINE HYCLATE 100 MG: 100 CAPSULE ORAL at 19:30

## 2023-11-28 RX ADMIN — QUETIAPINE FUMARATE 200 MG: 200 TABLET ORAL at 20:00

## 2023-11-28 RX ADMIN — POLYETHYLENE GLYCOL 3350 17 G: 17 POWDER, FOR SOLUTION ORAL at 08:50

## 2023-11-28 RX ADMIN — AMLODIPINE BESYLATE 10 MG: 5 TABLET ORAL at 08:50

## 2023-11-28 RX ADMIN — NICOTINE POLACRILEX 4 MG: 4 GUM, CHEWING BUCCAL at 08:55

## 2023-11-28 RX ADMIN — PROPRANOLOL HYDROCHLORIDE 10 MG: 10 TABLET ORAL at 08:50

## 2023-11-28 RX ADMIN — GABAPENTIN 1200 MG: 600 TABLET, FILM COATED ORAL at 08:50

## 2023-11-28 RX ADMIN — NICOTINE POLACRILEX 4 MG: 4 GUM, CHEWING BUCCAL at 13:08

## 2023-11-28 RX ADMIN — FLUPHENAZINE HYDROCHLORIDE 1 MG: 1 TABLET, FILM COATED ORAL at 13:08

## 2023-11-28 RX ADMIN — DOCUSATE SODIUM 100 MG: 100 CAPSULE, LIQUID FILLED ORAL at 08:51

## 2023-11-28 RX ADMIN — EMPAGLIFLOZIN 10 MG: 10 TABLET, FILM COATED ORAL at 08:50

## 2023-11-28 RX ADMIN — ACETAMINOPHEN 650 MG: 325 TABLET, FILM COATED ORAL at 12:11

## 2023-11-28 RX ADMIN — GABAPENTIN 1200 MG: 600 TABLET, FILM COATED ORAL at 19:30

## 2023-11-28 RX ADMIN — FLUPHENAZINE HYDROCHLORIDE 5 MG: 2.5 TABLET, FILM COATED ORAL at 08:50

## 2023-11-28 RX ADMIN — Medication 3 MG: at 02:46

## 2023-11-28 RX ADMIN — GABAPENTIN 1200 MG: 600 TABLET, FILM COATED ORAL at 13:08

## 2023-11-28 RX ADMIN — DOXYCYCLINE HYCLATE 100 MG: 100 CAPSULE ORAL at 08:50

## 2023-11-28 RX ADMIN — SULINDAC 200 MG: 200 TABLET ORAL at 02:46

## 2023-11-28 RX ADMIN — PROPRANOLOL HYDROCHLORIDE 10 MG: 10 TABLET ORAL at 19:30

## 2023-11-28 RX ADMIN — CLONAZEPAM 0.5 MG: 0.5 TABLET ORAL at 08:50

## 2023-11-28 RX ADMIN — DOCUSATE SODIUM 100 MG: 100 CAPSULE, LIQUID FILLED ORAL at 19:30

## 2023-11-28 RX ADMIN — SULINDAC 200 MG: 200 TABLET ORAL at 12:11

## 2023-11-28 RX ADMIN — NICOTINE POLACRILEX 4 MG: 4 GUM, CHEWING BUCCAL at 10:21

## 2023-11-28 RX ADMIN — PROPRANOLOL HYDROCHLORIDE 10 MG: 10 TABLET ORAL at 13:09

## 2023-11-28 RX ADMIN — PANTOPRAZOLE SODIUM 40 MG: 40 TABLET, DELAYED RELEASE ORAL at 08:50

## 2023-11-28 RX ADMIN — Medication 3 MG: at 21:15

## 2023-11-28 ASSESSMENT — ACTIVITIES OF DAILY LIVING (ADL)
ADLS_ACUITY_SCORE: 42
DRESS: INDEPENDENT
ADLS_ACUITY_SCORE: 42
HYGIENE/GROOMING: INDEPENDENT
ADLS_ACUITY_SCORE: 42
ADLS_ACUITY_SCORE: 42
ORAL_HYGIENE: INDEPENDENT
ADLS_ACUITY_SCORE: 42

## 2023-11-28 NOTE — PLAN OF CARE
Goal Outcome Evaluation:      Problem: Psychotic Signs/Symptoms  Goal: Improved Behavioral Control (Psychotic Signs/Symptoms)  Outcome: Progressing   Patient awake and presented with flat affect, mood agitated and stated that he did not get enough sleep, patient appeared upset and requesting to discharge today. Writer updated team and patient was informed that discharge is plan for Wednesday at 11 AM. Patient continue to seek staff frequently with multiple needs and questions. Blood sugar readings this shift was 227 and 171, intake is adequate, patient out in milieu most of this shift appeared frustrated and has limited interactions with selected peers, patient reported pain in left hand, PRN Tylenol and Sulindac given at 12:15 pm, patient encourage to apply cool pack for comfort, intervention was partially effective. Patient continue to verbalized frustration and requested for PRN Prolixin, and Nicotine gum given hourly, he is currently sleeping this afternoon, continue to monitor.

## 2023-11-28 NOTE — PLAN OF CARE
"Adult Inpatient Safety Plan:     [facility] Adult Inpatient Mental Health Units           [address]                                Ayana Prasad     SAFETY PLAN:  Step 1: Warning signs / cues (Thoughts, images, mood, situation, behavior) that a crisis may be developing:  Increased voices  Mood: Increased anxiety, hopeless  Behaviors: Sleeping too much, impulsiveness, not taking medications    Step 2: Coping strategies - Things I can do to take my mind off of my problems without contacting another person (relaxation technique, physical activity):  Distress Tolerance Strategies: Coloring, art, hanging out with friends, smoking  Physical Activities: Walking pet dog Nugget      Step 3: Remind myself of people and things that are important to me and worth living for:    Nugget  Family (mom and dad)  Friends    Step 4: When I am in crisis, I can ask these people to help me use my safety plan:   Name: Mom    Name: SUSIE   Name: Domenica     Step 5: Making the environment safe:   I will secure my medications, dispose of old medications, and identify supportive people      Step 6: Professionals or agencies I can contact during a crisis:  [facility] Crisis (COPE) Response - Adult [phone]  Text 4 Life: txt \"LIFE\" to 21697 for immediate support and crisis intervention  Crisis text line: Text \"[place]\" to 192435. Free, confidential, 24/7.  Cristy ()  Domenica ( )  Regine Skinner (psychiatrist)      If unable to maintain safety despite working your plan, call 911 or go to my nearest emergency department.         Patient helped develop this safety plan and agreed to use it when needed.  Pt has been given a copy of this plan.          Today s date:  November 28, 2023  Completed by Clinician Name/ Credentials: Lydia Salazar, DESIRE, LGAUGIE        Adapted from Safety Plan Template 2008 Vane Rivers and Eron Jaimes is reprinted with the express permission of the authors.  No portion of the Safety " Plan Template may be reproduced without the express, written permission.  You can contact the authors at [email or web address] or [email or web address].
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
## 2023-11-28 NOTE — PLAN OF CARE
Problem: Adult Behavioral Health Plan of Care  Goal: Plan of Care Review  Outcome: Progressing  Flowsheets (Taken 11/28/2023 1640)  Patient Agreement with Plan of Care: agrees     Problem: Anxiety  Goal: Anxiety Reduction or Resolution  Outcome: Progressing  Intervention: Promote Anxiety Reduction  Recent Flowsheet Documentation  Taken 11/28/2023 1640 by Kranthi Mack RN  Supportive Measures: active listening utilized  Family/Support System Care:   presence promoted   self-care encouraged   Goal Outcome Evaluation:    Plan of Care Reviewed With: patient      Patient continue to seek for attention and medications. Approached the nursing stations multiple times asking for medications and other needs.Rated anxiety of 6/10, but was able to stay calm and distracted watching movies and attending groups. Appetite was good-ate 100% dinner and HS snack. He remained compliant with medications and continue to deny most psych symptoms. Vitals were stable( see flow sheet) and no psychotic behavior or outburst was noted. Patient was nonetheless fixated on his plan discharge tomorrow. Will continue to monitor and encourage.

## 2023-11-28 NOTE — PLAN OF CARE
Care Coordinator scheduled the following appointment:     Individual Therapy appointment: Friday, December 1, 2023 @ 9am via Telehealth  Provider: MONIQUE Alexander   Location: [facility]  [address] [place] [address]   Phone: [phone]  Important Notes: Please log into [employer] at 8:30am to complete your Check-in. The email [facility] has on file is [email or web address]. This provider offers in-person visits for future appointments.      Care Coordinator conducted the following calls in search of a closer location for Individual Therapy:     [facility] ([phone]) does not have providers accepting new pts with Medicare. [facility] ([phone]) does not have providers accepting new pts with Medicare until February. A Care [facility] search offers openings with [facility] ([phone]), but the driving time is similar and requires transport into the city. [facility] ([phone]) only has [facility] in [place], at least a 30 minute drive from pt's residence. It's determined that [facility] is the closest location available d/t pt's insurance coverage.     Care Coordinator scheduled the following appointment:     Orthopedic appointment: Thursday, December 7, 2023 @ 1pm In-person  Provider: Stiven Correa MD  Location: [facility]  [address] [place], [place] [address]  Phone: [phone]    CC updated CTC and AVS

## 2023-11-28 NOTE — PLAN OF CARE
Problem: Sleep Disturbance  Goal: Adequate Sleep/Rest  11/28/2023 0650 by Dashawn Salvador, RN  Outcome: Progressing    Patient appears to have slept for 6 hours. Patient was agitated due to the other patient was awake and noisy, making an animal sounds. Patient shouted to staff to closed the door of his room. Patient had Sulindac for his L hand pain and Melatonin for sleep-effective. Will continue to monitor the patient and provide therapeutic intervention as needed. Will continue with current plan of care. Notify MD with any concerns.

## 2023-11-28 NOTE — PLAN OF CARE
BEH IP Unit Acuity Rating Score (UARS)      Patient is given one point for every criteria they meet.     CRITERIA SCORING   On a 72 hour hold, court hold, committed, stay of commitment, or revocation 1    Patient LOS on BEH unit exceeds 20 days 1  LOS: 21   Patient under guardianship, 55+, otherwise medically complex, or under age 11 0   Suicide ideation without relief of precipitating factors 0   Current plan for suicide 0   Current plan for homicide 0   Imminent risk or actual attempt to seriously harm another without relief of factors precipitating the attempt 0   Severe dysfunction in daily living (ex: complete neglect for self care, extreme disruption in vegetative function, extreme deterioration in social interactions) 1   Recent (last 7 days) or current physical aggression in the ED or on unit 0   Restraints or seclusion episode in past 72 hours 0   Recent (last 7 days) or current verbal aggression, agitation, yelling, etc., while in the ED or unit 0   Active psychosis 0   Need for constant or near constant redirection (from leaving, from others, etc).  0   Intrusive or disruptive behaviors 0   TOTAL 3

## 2023-11-29 ENCOUNTER — TELEPHONE (OUTPATIENT)
Dept: FAMILY MEDICINE | Facility: CLINIC | Age: 33
End: 2023-11-29

## 2023-11-29 VITALS
HEART RATE: 78 BPM | SYSTOLIC BLOOD PRESSURE: 124 MMHG | HEIGHT: 67 IN | TEMPERATURE: 97.7 F | BODY MASS INDEX: 36.95 KG/M2 | DIASTOLIC BLOOD PRESSURE: 82 MMHG | RESPIRATION RATE: 16 BRPM | WEIGHT: 235.4 LBS | OXYGEN SATURATION: 96 %

## 2023-11-29 LAB — GLUCOSE BLDC GLUCOMTR-MCNC: 164 MG/DL (ref 70–99)

## 2023-11-29 PROCEDURE — 250N000013 HC RX MED GY IP 250 OP 250 PS 637

## 2023-11-29 PROCEDURE — 250N000013 HC RX MED GY IP 250 OP 250 PS 637: Performed by: STUDENT IN AN ORGANIZED HEALTH CARE EDUCATION/TRAINING PROGRAM

## 2023-11-29 PROCEDURE — 99239 HOSP IP/OBS DSCHRG MGMT >30: CPT | Mod: GC | Performed by: PSYCHIATRY & NEUROLOGY

## 2023-11-29 PROCEDURE — 250N000011 HC RX IP 250 OP 636

## 2023-11-29 PROCEDURE — A9270 NON-COVERED ITEM OR SERVICE: HCPCS

## 2023-11-29 RX ORDER — FLUPHENAZINE DECANOATE 25 MG/ML
12.5 INJECTION, SOLUTION INTRAMUSCULAR; SUBCUTANEOUS
Qty: 1 ML | Refills: 0 | Status: SHIPPED | OUTPATIENT
Start: 2023-12-11 | End: 2023-12-29 | Stop reason: DRUGHIGH

## 2023-11-29 RX ADMIN — PANTOPRAZOLE SODIUM 40 MG: 40 TABLET, DELAYED RELEASE ORAL at 07:56

## 2023-11-29 RX ADMIN — NICOTINE POLACRILEX 4 MG: 4 GUM, CHEWING BUCCAL at 06:14

## 2023-11-29 RX ADMIN — CLONAZEPAM 0.25 MG: 0.5 TABLET ORAL at 07:52

## 2023-11-29 RX ADMIN — ONDANSETRON 4 MG: 4 TABLET ORAL at 08:16

## 2023-11-29 RX ADMIN — ROSUVASTATIN 40 MG: 20 TABLET, FILM COATED ORAL at 07:52

## 2023-11-29 RX ADMIN — NICOTINE POLACRILEX 4 MG: 4 GUM, CHEWING BUCCAL at 07:55

## 2023-11-29 RX ADMIN — NICOTINE 1 PATCH: 21 PATCH, EXTENDED RELEASE TRANSDERMAL at 07:52

## 2023-11-29 RX ADMIN — PROPRANOLOL HYDROCHLORIDE 10 MG: 10 TABLET ORAL at 07:53

## 2023-11-29 RX ADMIN — AMLODIPINE BESYLATE 10 MG: 5 TABLET ORAL at 07:52

## 2023-11-29 RX ADMIN — DOXYCYCLINE HYCLATE 100 MG: 100 CAPSULE ORAL at 07:53

## 2023-11-29 RX ADMIN — GABAPENTIN 1200 MG: 600 TABLET, FILM COATED ORAL at 07:52

## 2023-11-29 RX ADMIN — INSULIN GLARGINE 25 UNITS: 100 INJECTION, SOLUTION SUBCUTANEOUS at 08:02

## 2023-11-29 RX ADMIN — NICOTINE POLACRILEX 4 MG: 4 GUM, CHEWING BUCCAL at 09:57

## 2023-11-29 RX ADMIN — TESTOSTERONE 50 MG OF TESTOSTERONE: 50 GEL TRANSDERMAL at 07:52

## 2023-11-29 RX ADMIN — EMPAGLIFLOZIN 10 MG: 10 TABLET, FILM COATED ORAL at 07:52

## 2023-11-29 RX ADMIN — ATOMOXETINE HYDROCHLORIDE 40 MG: 40 CAPSULE ORAL at 07:52

## 2023-11-29 ASSESSMENT — ACTIVITIES OF DAILY LIVING (ADL)
ADLS_ACUITY_SCORE: 42

## 2023-11-29 NOTE — PROVIDER NOTIFICATION
11/29/23 1018   Individualization/Patient Specific Goals   Patient Personal Strengths community support;expressive of emotions;expressive of needs;family/social support;humor;interests/hobbies;positive attitude;resilient;resourceful;stable living environment;tolerant   Patient Vulnerabilities adverse childhood experience(s);history of unsuccessful treatment;substance abuse/addiction;traumatic event   Anxieties, Fears or Concerns None   Individualized Care Needs None   Interprofessional Rounds   Summary 1. Stabilization of mood/thought disorder sx's 2. Safe with self 3. Medication mgmt per MD's 4. Coordination of care with outpatient providers 5.Housing addressed 6. Outpatient f/u care in place   Participants CTC;nursing;psychiatrist;patient   Behavioral Team Discussion   Participants Dr. Willson, Dr De Leon,.  Suyapa Correa RN, Luz Lincoln MA.LP, Pharmacy, Med students   Progress Patient feeling stable- feels ready to discharge back to .  Care has been coordinated and all in agreement for patient to return to current  while alternate placement is being sought.   Anticipated length of stay DC today   Continued Stay Criteria/Rationale N/A   Medical/Physical Fractured finger   Precautions Per unit protocol   Plan Care has been coordinated and all outpatient provders in agreement for patient to return to current  while alternate placement is being sought.  Outpaitent f/u care in place. PD has been drawn up and patient signed-   Rationale for change in precautions or plan N/A   Safety Plan Patient completed   Anticipated Discharge Disposition group home
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
## 2023-11-29 NOTE — PLAN OF CARE
Problem: Sleep Disturbance  Goal: Adequate Sleep/Rest  Outcome: Progressing   Patient alert and oriented x 4. Patient complained of L hand pain-declined any intervention stated Tylenol not helpful, ice pack offered but also declined. Patient complained of runny and congested nose, burning eyes and headache- left sticky note to MD. Patient endorsed AH voices are quite. Denied anxiety and depression. Will continue to monitor the patient and provide therapeutic intervention as needed. Will continue with current plan of care. Notify MD with any concerns.

## 2023-11-29 NOTE — PLAN OF CARE
AVS was given to and discussed with pt; he communicated understanding. Discharge medications were sent to outside pharmacy; pt is aware. Belongings were returned to him. He denied any active SI/SIB thoughts or urges at this time; no safety concerns at time of discharge. Pt was escorted by staff down to [facility] entrance. Group home staff picked him up and transported him back to [facility].    Problem: Adult Inpatient Plan of Care  Goal: Plan of Care Review  Description: The Plan of Care Review/Shift note should be completed every shift.  The Outcome Evaluation is a brief statement about your assessment that the patient is improving, declining, or no change.  This information will be displayed automatically on your shift  note.  Outcome: Adequate for Care Transition

## 2023-11-30 ENCOUNTER — TELEPHONE (OUTPATIENT)
Dept: BEHAVIORAL HEALTH | Facility: CLINIC | Age: 33
End: 2023-11-30
Payer: MEDICARE

## 2023-11-30 NOTE — TELEPHONE ENCOUNTER
Reason for Call:  Appointment Request    Patient requesting this type of appt:  Hospital/ED Follow-Up     Requested provider: Becki Avery    Reason patient unable to be scheduled: Not within requested timeframe    When does patient want to be seen/preferred time: 3-7 days    Comments: scheduled for 12/20, patient hoping for sooner    Could we send this information to you in [facility] or would you prefer to receive a phone call?:   Patient would prefer a phone call   Okay to leave a detailed message?: Yes at Cell number on file:    Telephone Information:   Mobile [phone]       Call taken on 11/29/2023 at 8:16 PM by Vero Ji MA

## 2023-11-30 NOTE — TELEPHONE ENCOUNTER
----- Message from Rosa Martinez [facility] sent at 11/29/2023 11:09 AM CST -----  Regarding: Patient starting IOP/DT 2 on 12/4  Adult Mental Health Programmatic Care Schedule Request    Patient Name: Ayana Prasad  Location of programming: [facility]  Start Date: 12/4/23      Adult Program Group: Adult Program Group: DT/IOP 2 Psychosis Track [[id number]]  Schedule: M, T, Th 12 noon - 3 pm  9 hours per week for 12 weeks  Number of visits to be scheduled: 36 days    Attending Provider (MD):  Dr Oswaldo Seaman.  Visit Type:  In-Person    Accommodations Needed: N/A  Alerts Identified/Substantiation: N/A  Consulted with Supervisor: N/A    Send to:   [UR [facility] ([id number])] ##ONLY if Start Date is determined##  NG 14 OBC's (BEH BEHAVIORAL OUTPATIENT ASSESSMENTS [[id number]])   Rosa Martinez ([facility])  Amy Lopez (PHP)  Denisha Ledbetter

## 2023-11-30 NOTE — TELEPHONE ENCOUNTER
The only option is to bump the 930 scheduled on 12/6.  The scheduled patient is in the hospital follow-up slot and they are coming for routine care.    Becki JENKINS

## 2023-12-01 ENCOUNTER — VIRTUAL VISIT (OUTPATIENT)
Dept: PSYCHIATRY | Facility: CLINIC | Age: 33
End: 2023-12-01
Attending: NURSE PRACTITIONER
Payer: MEDICARE

## 2023-12-01 ENCOUNTER — TELEPHONE (OUTPATIENT)
Dept: PLASTIC SURGERY | Facility: CLINIC | Age: 33
End: 2023-12-01
Payer: MEDICARE

## 2023-12-01 ENCOUNTER — TELEPHONE (OUTPATIENT)
Dept: PSYCHIATRY | Facility: CLINIC | Age: 33
End: 2023-12-01
Payer: MEDICARE

## 2023-12-01 VITALS — BODY MASS INDEX: 36.88 KG/M2 | HEIGHT: 67 IN | WEIGHT: 235 LBS

## 2023-12-01 DIAGNOSIS — F15.21 AMPHETAMINE USE DISORDER, MODERATE, IN EARLY REMISSION (H): ICD-10-CM

## 2023-12-01 DIAGNOSIS — F90.9 ATTENTION DEFICIT HYPERACTIVITY DISORDER (ADHD), UNSPECIFIED ADHD TYPE: ICD-10-CM

## 2023-12-01 DIAGNOSIS — F43.10 PTSD (POST-TRAUMATIC STRESS DISORDER): ICD-10-CM

## 2023-12-01 DIAGNOSIS — F11.21 OPIOID USE DISORDER, MODERATE, IN EARLY REMISSION (H): ICD-10-CM

## 2023-12-01 DIAGNOSIS — F39 MOOD DISORDER (H): ICD-10-CM

## 2023-12-01 DIAGNOSIS — F12.21 CANNABIS USE DISORDER, SEVERE, IN EARLY REMISSION (H): ICD-10-CM

## 2023-12-01 DIAGNOSIS — F16.21: ICD-10-CM

## 2023-12-01 DIAGNOSIS — F41.9 ANXIETY: Primary | ICD-10-CM

## 2023-12-01 DIAGNOSIS — F25.0 SCHIZOAFFECTIVE DISORDER, BIPOLAR TYPE (H): ICD-10-CM

## 2023-12-01 PROCEDURE — 99443 PR PHYSICIAN TELEPHONE EVALUATION 21-30 MIN: CPT | Mod: 95 | Performed by: NURSE PRACTITIONER

## 2023-12-01 RX ORDER — ATOMOXETINE 40 MG/1
80 CAPSULE ORAL DAILY
Qty: 60 CAPSULE | Refills: 1 | Status: SHIPPED | OUTPATIENT
Start: 2023-12-01 | End: 2023-12-29

## 2023-12-01 RX ORDER — FLUPHENAZINE HYDROCHLORIDE 1 MG/1
3 TABLET ORAL 2 TIMES DAILY PRN
Qty: 60 TABLET | Refills: 0 | Status: SHIPPED | OUTPATIENT
Start: 2023-12-01 | End: 2023-12-08

## 2023-12-01 ASSESSMENT — PAIN SCALES - GENERAL: PAINLEVEL: MODERATE PAIN (4)

## 2023-12-01 NOTE — PATIENT INSTRUCTIONS
-Increase Strattera to 80 mg daily for ADHD. Please have  staff monitor your blood pressure 2-3 times a week in consistent time and report back to mattie in next visit. If your blood pressure is consistently over 140/90, please contact the clinic.  -As need Prolixin is changed to 3 mg up to 2 times a day for anxiety or agitation.  This is in addition to 5 mg daily and injection. Monitor for tardive dyskinesia.  -Klonopin is discontinued as this was directed to stop after today's dose from inpatient.  -Continue all other medication regimen for now.     -Please make medication therapy management with Pharm D. If you don't hear from them in 3 days, please call [phone].    Your next appointment is scheduled on 12/8/2023 (Fri) at 1pm.    Thank you for coming to the [facility] [place] [facility].    Lab Testing:  If you had lab testing today and your results are reassuring or normal they will be mailed to you or sent through [employer] within 7 days. If the lab tests need quick action we will call you with the results. The phone number we will call with results is # [phone] (home) . If this is not the best number please call our clinic and change the number.    Medication Refills:  If you need any refills please call your pharmacy and they will contact us. Our fax number for refills is [phone]. Please allow three business for refill processing. If you need to  your refill at a new pharmacy, please contact the new pharmacy directly. The new pharmacy will help you get your medications transferred.     Scheduling:  If you have any concerns about today's visit or wish to schedule another appointment please call our office during normal business hours [phone] (8-5:00 M-F)    Contact Us:  Please call [phone] during business hours (8-5:00 M-F).  If after clinic hours, or on the weekend, please call  [phone].    Financial Assistance [phone]  [facility]  Billing [phone]  Central Billing Office, [facility]: [phone]  [facility] [phone]  Medical Records [phone]      MENTAL HEALTH CRISIS NUMBERS:  For a medical emergency please call  911 or go to the nearest ER.     [facility]:   [facility] -[phone]   Crisis Residence [facility] -[phone]   Walk-In Counseling Center [facility] -[phone]   COPE 24/7 [place] Mobile Team -[phone] (adults)/[phone] (child)  [facility] - [phone]      [facility]:   [facility] - [phone]   Walk-in counseling [facility] - [phone]   Walk-in counseling [facility] - [phone]   Crisis Residence [facility] - [phone]  Urgent Care Adult Mental Prpibd-[phone] mobile unit/ 24/7 crisis line    National Crisis Numbers:   National Suicide Prevention Lifeline: 7-390-792-TALK (892-410-8798)  Poison Control Center - 4-254-226-5168  [employer] [place] [employer]/resources for a list of additional resources (SOS)  Trans Lifeline a hotline for transgender people 5-589-706-5520  The [employer] a hotline for LGBT youth 1-512.965.4714  Crisis Text Line: For any crisis 24/7   To: 659221  see www.crisistextline.org  - IF MAKING A CALL FEELS TOO HARD, send a text!         Again thank you for choosing [facility] [place] and please let us know how we can best partner with you to improve you and your family's health.    You may be receiving a survey regarding this appointment. We would love to have your feedback, both positive and negative. The survey is done by an external company, so your answers are anonymous.

## 2023-12-01 NOTE — NURSING NOTE
Is the patient currently in the state of [place]? YES    Visit mode:VIDEO    If the visit is dropped, the patient can be reconnected by: VIDEO VISIT: Text to cell phone:   Telephone Information:   Mobile [phone]       Will anyone else be joining the visit? NO  (If patient encounters technical issues they should call [phone])    How would you like to obtain your AVS? MyChart    Are changes needed to the allergy or medication list? No    Reason for visit: RECHECK    Kezia VANN

## 2023-12-01 NOTE — TELEPHONE ENCOUNTER
----- Message from BROOKLYN Calvin CNP sent at 12/1/2023  1:59 PM CST -----  Regarding: CM and Prolixin IM  So pt today said he was not aware that he will get Prolixin injection at  as previously  did not offer this. Discharge person in inpatient said this is coordinated with GH and CM.  Would you pls contact CM to make sure that this is arranged at ?    Pt is also having ACT team intake on 12/5 and this may be taken over by ACT team if service can be established that quickly, just FYI.    Thank you    Follow Up:  Writer attempted to call patient's , Cristy Ji at [phone] to discuss the above. LVM requesting a call back at the main clinic number. Note from pharmacy states that injection was last given on 11/27 and due q 14 days.

## 2023-12-01 NOTE — Clinical Note
Just FYI since pt has an appt with you on 12/8. Pt was recently hospitalized inpt with Ativan overdose.  As I have noted previously, I think this pt really needs DBT to have better coping skills. But pt sounds he is more motivated than before. He actually discussed with me he wants my support as he has not been able to get gender affirming surgery due to his instability. I think this motivated him more, so he is back on lithium and Prolixin which provided over 6 months of stability before. CM is working on him to move to different  and also trying to establish ACT (assertive community treatment) team with agency that has more wrap around service. If he gets accepted at ACT team, he will move psychiatry to ACT team who can see him more often and possibly home visiting ability.  Just FYI. Thank you

## 2023-12-01 NOTE — TELEPHONE ENCOUNTER
RN called Prakash to verify appointment date and time. Pt inquired if this appointment was to set a date for surgery or if it was to plan for what needs to be done before surgery. RN clarified that this appointment would be to discuss physical and mental health treatments and plan for getting to a place where he was prepared for surgery.  Beena Sinclair RN on 12/1/2023 at 2:35 PM

## 2023-12-01 NOTE — PROGRESS NOTES
"Virtual Visit Details    Type of service:  Video Visit   Video Start Time: {video visit start/end time for provider to select:617160}  Video End Time:{video visit start/end time for provider to select:523871}    Originating Location (pt. Location): {video visit patient location:191241::\"Home\"}  {PROVIDER LOCATION On-site should be selected for visits conducted from your clinic location or adjoining [facility] hospital, academic office, or other nearby [facility] building. Off-site should be selected for all other provider locations, including home:852271}  Distant Location (provider location):  {virtual location provider:168783}  Platform used for Video Visit: {Virtual Visit Platforms:290771::\"Clinical Data\"}  "
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
## 2023-12-03 ENCOUNTER — HEALTH MAINTENANCE LETTER (OUTPATIENT)
Age: 33
End: 2023-12-03

## 2023-12-04 ENCOUNTER — TELEPHONE (OUTPATIENT)
Dept: PSYCHIATRY | Facility: CLINIC | Age: 33
End: 2023-12-04
Payer: MEDICARE

## 2023-12-04 NOTE — TELEPHONE ENCOUNTER
Health Call Center    Phone Message    May a detailed message be left on voicemail: yes     Reason for Call: Symptoms or Concerns     If patient has red-flag symptoms, warm transfer to triage line    Current symptom or concern: Patient feels like he's in a dream and is having a hard time connecting to people because nothing feels real.    Symptoms have been present for: He has been experiencing this since yesterday.    Has patient previously been seen for this?  No      Are there any new or worsening symptoms?  N/A    Action Taken: Message routed to:  Other: psychiatry nurse p    Travel Screening: Not Applicable

## 2023-12-04 NOTE — TELEPHONE ENCOUNTER
"Writer returned patient's call. Patient states he has been having a hard time connecting with people and has felt like he is in a dream since yesterday. He reports he went to get a cast on his broken hand today and states it felt \"unreal\" and like he wasn't there. He states it \"felt like they were talking to a shell.\" He also states that he feels more unaware of how to read people and how they perceive him and states he is experiencing some paranoia. Patient reports this feeling has increased his anxiety.     Patient denies any triggers yesterday. He states he recently had to quickly move into a new group home on Friday, which has caused some anxiety. He reports Dong (his dog) is there, which is good, but he doesn't not know his place yet in this home and states that all of his belongings are at the old group home. He states he can get a $3,000 precious from [employer] to help hire movers, and buy some new belongings, so he is looking into this.     Patient denies any safety concerns of SI, SIB or HI. Patient denies substance use. He states his Straterra was increased at last visit and he has been taking 80 mg for the past 3 days. He states he has only been taking PRN prolixin once daily.     Patient reports that he missed his most recent therapy appointment due to his living situation, but states that tomorrow, his  is going to help him set up another appointment.     Routed to provider for review.   "

## 2023-12-04 NOTE — TELEPHONE ENCOUNTER
Phone call to Prakash, who fractured their hand and had to  Get a cast, had a doctor appointment today.    Is in pain, will start on Monday 12/11/23.      John Ling, ROSALINA,  Licensed Clinical Psychologist

## 2023-12-05 ENCOUNTER — MYC MEDICAL ADVICE (OUTPATIENT)
Dept: FAMILY MEDICINE | Facility: CLINIC | Age: 33
End: 2023-12-05

## 2023-12-05 DIAGNOSIS — Z78.9 FEMALE-TO-MALE TRANSGENDER PERSON: ICD-10-CM

## 2023-12-05 DIAGNOSIS — Z72.0 TOBACCO ABUSE: Primary | ICD-10-CM

## 2023-12-05 DIAGNOSIS — F41.1 GAD (GENERALIZED ANXIETY DISORDER): ICD-10-CM

## 2023-12-05 NOTE — TELEPHONE ENCOUNTER
Writer attempted to call , Cristy Ji to inquire about whether injections had been sent up at new group home. LVM requesting a call back at the main clinic number.

## 2023-12-06 RX ORDER — TESTOSTERONE GEL, 1% 10 MG/G
50 GEL TRANSDERMAL DAILY
Qty: 30 G | OUTPATIENT
Start: 2023-12-06 | End: 2024-01-05

## 2023-12-06 RX ORDER — CLONAZEPAM 0.5 MG/1
0.25 TABLET ORAL DAILY
Qty: 1 TABLET | OUTPATIENT
Start: 2023-12-06 | End: 2023-12-08

## 2023-12-06 NOTE — TELEPHONE ENCOUNTER
"Medication requested:  CLONAZEPAM 0.5MG TABLET    Per 12/1/23 note; \"Klonopin was discontinued today as it was the last dose this AM \"   Medication requested:  TESTOSTERONE OUTER UD 50MG/5GM GEL (GRAM)   Last refilled: 11/28/23  Qty: 30/0 discharge medication      Last seen: 12/1/23  RTC: 1 week  Cancel: 0  No-show: 0  Next appt: 12/8/23 [facility]    Refill decision:   Denied: Klonopin was discontinued 12/1/23   Testosterone 30 day refill sent 11/28/23    "

## 2023-12-07 ENCOUNTER — VIRTUAL VISIT (OUTPATIENT)
Dept: FAMILY MEDICINE | Facility: CLINIC | Age: 33
End: 2023-12-07
Payer: MEDICARE

## 2023-12-07 ENCOUNTER — TELEPHONE (OUTPATIENT)
Dept: BEHAVIORAL HEALTH | Facility: CLINIC | Age: 33
End: 2023-12-07

## 2023-12-07 ENCOUNTER — TELEPHONE (OUTPATIENT)
Dept: FAMILY MEDICINE | Facility: CLINIC | Age: 33
End: 2023-12-07

## 2023-12-07 DIAGNOSIS — S62.92XD CLOSED FRACTURE OF LEFT HAND WITH ROUTINE HEALING, SUBSEQUENT ENCOUNTER: Primary | ICD-10-CM

## 2023-12-07 DIAGNOSIS — R11.0 NAUSEA: ICD-10-CM

## 2023-12-07 PROCEDURE — 99213 OFFICE O/P EST LOW 20 MIN: CPT | Mod: VID | Performed by: PHYSICIAN ASSISTANT

## 2023-12-07 RX ORDER — METHOCARBAMOL 500 MG/1
250-500 TABLET, FILM COATED ORAL 3 TIMES DAILY PRN
Qty: 30 TABLET | Refills: 0 | Status: SHIPPED | OUTPATIENT
Start: 2023-12-07 | End: 2024-03-08

## 2023-12-07 RX ORDER — ONDANSETRON 4 MG/1
4 TABLET, ORALLY DISINTEGRATING ORAL EVERY 8 HOURS PRN
Qty: 30 TABLET | Refills: 0 | Status: ON HOLD | OUTPATIENT
Start: 2023-12-07 | End: 2024-04-12

## 2023-12-07 NOTE — TELEPHONE ENCOUNTER
Patient calling to requests pain medication for hand pain. Patient broke hand back in February and this has been an ongoing issue. Patient reports he needs surgery and has a consult visit 12/11/23 for this but needs something now to help with the pain. Denies severe pain and stated its currently casted. Nurse advised that an appointment would be needed to discuss this. Patient scheduled for a video visit this afternoon with a same day provider to discuss.     Kristin Machado RN

## 2023-12-07 NOTE — PROGRESS NOTES
"Prakash is a 33 year old who is being evaluated via a billable video visit.      How would you like to obtain your AVS? MyChart  If the video visit is dropped, the invitation should be resent by: Text to cell phone: [phone]  Will anyone else be joining your video visit? No  {If patient encounters technical issues they should call [phone] :908478}        {PROVIDER CHARTING PREFERENCE:432113}    Subjective   Prakash is a 33 year old, presenting for the following health issues:  Pain      12/7/2023     3:12 PM   Additional Questions   Roomed by MP   Accompanied by NA         12/7/2023     3:12 PM   Patient Reported Additional Medications   Patient reports taking the following new medications None per patient       HPI     ED/UC Followup:    Facility:  [facility]  Date of visit: 11/20/23  Reason for visit: left hand-boxer fracture  Current Status: discharged- got worse-bone moved  {additonal problems for provider to add (Optional):855226}      Review of Systems   {ROS COMP (Optional):975667}      Objective           Vitals:  No vitals were obtained today due to virtual visit.    Physical Exam   {video visit exam brief selected:774539}    {Diagnostic Test Results (Optional):628867}    {AMBULATORY ATTESTATION (Optional):046283}        Video-Visit Details    Type of service:  Video Visit   Video Start Time: {video visit start/end time for provider to select:890814}  Video End Time:{video visit start/end time for provider to select:475609}    Originating Location (pt. Location): {video visit patient location:647144::\"Home\"}  {PROVIDER LOCATION On-site should be selected for visits conducted from your clinic location or adjoining [facility] hospital, academic office, or other nearby [facility] building. Off-site should be selected for all other provider locations, including home:751122}  Distant Location (provider location):  {virtual location provider:218564}  Platform used for Video Visit: {Virtual Visit " "Platforms:941187::\"Evens\"}      "

## 2023-12-07 NOTE — TELEPHONE ENCOUNTER
"Ondansetron not on active med list.    PCP Becki Avery last had virtual visit with patient 9/28/23, plan:    We will plan office visit in 3 months with fasting labs.  - rosuvastatin (CRESTOR) 40 MG tablet; Take 1 tablet (40 mg) by mouth daily       I called and spoke to patient.  Patient is scheduled for 12/20 ER follow up, has routine visit 1/3/24.    He will plan to be fasting for one or the other.    Patient says he's been using zofran 4 mg ODT for a long time, generally uses one a day.   Says the Rx came off his med list when he was discharged from the hospital.   Is requesting refill.   Patient says he wants the 4 mg sent as the 8 mg is \"too much\".        Carmelo'd Rx and routed to PCP to advise.    Routing refill request to provider for review/approval because:  Drug not active on patient's medication list    Edith HERNANDEZ RN  [facility] Triage                  "

## 2023-12-07 NOTE — PATIENT INSTRUCTIONS
Clyde Randall,    Thank you for allowing [facility] to manage your care.    If you develop worsening/changing symptoms at any time, please be seen in clinic/urgent care or call 911/go to the emergency department for evaluation as we discussed.    I sent your prescriptions to your pharmacy.    For your pain, please use Tylenol 1000mg every 6 hours. You may continue to use sulindac as prescribed.     Max acetaminophen (Tylenol) 4,000mg/24 hours    For pain not controlled by over the counter medications, please use methocarbamol as prescribed. Do not use this medication while driving, operating machinery, with other sedating medications, or while drinking alcohol as it will make you drowsy.    If you have any questions or concerns, please feel free to call us at [phone]    Sincerely,    Adolfo Chavis PA-C    Did you know?      You can schedule a video visit for follow-up appointments as well as future appointments for certain conditions.  Please see the below link.     [email or web address]    If you have not already done so,  I encourage you to sign up for [facility] ([email or web address]).  This will allow you to review your results, securely communicate with a provider, and schedule virtual visits as well.

## 2023-12-07 NOTE — TELEPHONE ENCOUNTER
Reason for Call:  Other prescription    Detailed comments:  ondansetron (ZOFRAN) 8 MG tablet     Phone Number Patient can be reached at: Other phone number:  [phone] send script to [facility]    Best Time:  Asap     Can we leave a detailed message on this number? YES    Call taken on 12/7/2023 at 9:04 AM by Marge Sorto

## 2023-12-07 NOTE — TELEPHONE ENCOUNTER
Writer and patient discussed programming, patient will not be starting at this time due to broken hand and needing surgery.    CINTHYA Lenz on 12/7/2023 at 10:34 AM

## 2023-12-07 NOTE — PROGRESS NOTES
"Prakash is a 33 year old who is being evaluated via a billable video visit.      How would you like to obtain your AVS? MyChart  If the video visit is dropped, the invitation should be resent by: Text to cell phone: [phone]  Will anyone else be joining your video visit? No          Assessment & Plan   Problem List Items Addressed This Visit    None  Visit Diagnoses       Closed fracture of left hand with routine healing, subsequent encounter    -  Primary    Relevant Medications    methocarbamol (ROBAXIN) 500 MG tablet           Patient sustained a 5th metacarpal fracture of his left hand while admitted to inpatient psych on 11/21/23. Ongoing pain. Has ortho following and an appt with them soon. Discussed pain mgmt at length and he will trial Robaxin to help with sleep and continue schedule otc analgesics, elevation and follow up with ortho. No numbness/tingling or concern for neurovascular compromise at this point.    DDx and Dx discussed with and explained to the pt to their satisfaction.  All questions were answered at this time. Pt expressed understanding of and agreement with this dx, tx, and plan. No further workup warranted and standard medication warnings given. I have given the patient a list of pertinent indications for re-evaluation. Will go to the Emergency Department if symptoms worsen or new concerning symptoms arise. Patient left the call in no apparent distress.     Prescription drug management  24 minutes spent by me on the date of the encounter doing chart review, history and exam, documentation and further activities per the note     BMI:   Estimated body mass index is 36.81 kg/m  as calculated from the following:    Height as of 12/1/23: 1.702 m (5' 7\").    Weight as of 12/1/23: 106.6 kg (235 lb).     See Patient Instructions    VASILE Rosen  [facility] SHAILESH Randall is a 33 year old, presenting for the following health issues:  No chief complaint on " file.    HPI   Takes sulindac and APAP at night. Injured left hand while inpatient at [facility]. In cast. 9/10 pain that seems worse at night. No numbness/tingling. Had oxycodone while inpatient.     Review of Systems   Constitutional, HEENT, cardiovascular, pulmonary, gi and gu systems are negative, except as otherwise noted.      Objective           Vitals:  No vitals were obtained today due to virtual visit.    Physical Exam   GENERAL: Healthy, alert and no distress  EYES: Eyes grossly normal to inspection.  No discharge or erythema, or obvious scleral/conjunctival abnormalities.  RESP: No audible wheeze, cough, or visible cyanosis.  No visible retractions or increased work of breathing.    SKIN: Visible skin clear. No significant rash, abnormal pigmentation or lesions.  NEURO: Cranial nerves grossly intact.  Mentation and speech appropriate for age.  PSYCH: Mentation appears normal, affect normal/bright, judgement and insight intact, normal speech and appearance well-groomed.        Video-Visit Details    Type of service:  Video Visit   Video Start Time: 3:30 PM  Video End Time:3:47 PM    Originating Location (pt. Location): Home    Distant Location (provider location):  On-site  Platform used for Video Visit: Elías

## 2023-12-07 NOTE — TELEPHONE ENCOUNTER
----- Message from CINTHYA Woodson sent at 12/7/2023 10:36 AM CST -----  Regarding: Patient not attending programming at this time  Patient will not be attending program, please cancel start and all appts on IOP/DT 2 CATERINA, thanks!    CINTHYA Lenz on 12/7/2023 at 10:36 AM

## 2023-12-08 ENCOUNTER — TELEPHONE (OUTPATIENT)
Dept: PSYCHIATRY | Facility: CLINIC | Age: 33
End: 2023-12-08
Payer: MEDICARE

## 2023-12-08 ENCOUNTER — TELEPHONE (OUTPATIENT)
Dept: PHARMACY | Facility: CLINIC | Age: 33
End: 2023-12-08
Payer: MEDICARE

## 2023-12-08 ENCOUNTER — VIRTUAL VISIT (OUTPATIENT)
Dept: PSYCHIATRY | Facility: CLINIC | Age: 33
End: 2023-12-08
Attending: NURSE PRACTITIONER
Payer: MEDICARE

## 2023-12-08 DIAGNOSIS — F25.9 SCHIZOAFFECTIVE DISORDER, CHRONIC CONDITION WITH ACUTE EXACERBATION (H): ICD-10-CM

## 2023-12-08 DIAGNOSIS — F31.64 BIPOLAR AFFECTIVE DISORDER, MIXED, SEVERE, WITH PSYCHOTIC BEHAVIOR (H): ICD-10-CM

## 2023-12-08 DIAGNOSIS — F41.9 ANXIETY: ICD-10-CM

## 2023-12-08 DIAGNOSIS — F25.0 SCHIZOAFFECTIVE DISORDER, BIPOLAR TYPE (H): Primary | ICD-10-CM

## 2023-12-08 PROCEDURE — 99443 PR PHYSICIAN TELEPHONE EVALUATION 21-30 MIN: CPT | Mod: 95 | Performed by: NURSE PRACTITIONER

## 2023-12-08 RX ORDER — PROPRANOLOL HYDROCHLORIDE 10 MG/1
10 TABLET ORAL 3 TIMES DAILY
Qty: 90 TABLET | Refills: 0 | Status: SHIPPED | OUTPATIENT
Start: 2023-12-08 | End: 2023-12-29

## 2023-12-08 RX ORDER — QUETIAPINE FUMARATE 200 MG/1
200 TABLET, FILM COATED ORAL AT BEDTIME
Qty: 30 TABLET | Refills: 0 | Status: SHIPPED | OUTPATIENT
Start: 2023-12-08 | End: 2023-12-29

## 2023-12-08 RX ORDER — CLONAZEPAM 0.5 MG/1
0.25 TABLET ORAL DAILY PRN
Qty: 2.5 TABLET | Refills: 0 | Status: SHIPPED | OUTPATIENT
Start: 2023-12-08 | End: 2023-12-29

## 2023-12-08 RX ORDER — FLUPHENAZINE HYDROCHLORIDE 5 MG/1
5 TABLET ORAL DAILY
Qty: 5 TABLET | Refills: 0 | Status: SHIPPED | OUTPATIENT
Start: 2023-12-08 | End: 2023-12-29

## 2023-12-08 RX ORDER — LITHIUM CARBONATE 450 MG
900 TABLET, EXTENDED RELEASE ORAL AT BEDTIME
Qty: 60 TABLET | Refills: 0 | Status: SHIPPED | OUTPATIENT
Start: 2023-12-08 | End: 2023-12-29

## 2023-12-08 RX ORDER — FLUPHENAZINE DECANOATE 25 MG/ML
25 INJECTION, SOLUTION INTRAMUSCULAR; SUBCUTANEOUS
Qty: 25 ML | Refills: 1 | OUTPATIENT
Start: 2023-12-11 | End: 2023-12-29

## 2023-12-08 NOTE — PATIENT INSTRUCTIONS
-Have Prolixin injection on 12/11 at group home.  -On 12/13, discontinue scheduled oral Prolixin 5 mg daily.  -as needed Prolixin is discontinued today as you are not needing the medication.  -May take Klonopin 0.25 mg daily as needed for panic attack. You have 5 tabs per month.  -Continue all other medication regimen for now. Monitor restless leg.    Your next appointment is scheduled on 12/29/2023 (Fri) at 1pm.    Thank you for coming to the [facility] [place] [facility].    Lab Testing:  If you had lab testing today and your results are reassuring or normal they will be mailed to you or sent through [place] within 7 days. If the lab tests need quick action we will call you with the results. The phone number we will call with results is # [phone] (home) . If this is not the best number please call our clinic and change the number.    Medication Refills:  If you need any refills please call your pharmacy and they will contact us. Our fax number for refills is [phone]. Please allow three business for refill processing. If you need to  your refill at a new pharmacy, please contact the new pharmacy directly. The new pharmacy will help you get your medications transferred.     Scheduling:  If you have any concerns about today's visit or wish to schedule another appointment please call our office during normal business hours [phone] (8-5:00 M-F)    Contact Us:  Please call [phone] during business hours (8-5:00 M-F).  If after clinic hours, or on the weekend, please call  [phone].    Financial Assistance [phone]  [facility] [phone]  Central Billing Office, [facility]: [phone]  [facility] [phone]  Medical Records [phone]      MENTAL HEALTH CRISIS NUMBERS:  For a medical emergency please call  911 or go to the nearest ER.     [facility]:   [facility] -[phone]   Crisis Residence [facility] -[phone]   [facility] Center [facility] -[phone]   COPE 24/7 [facility] Mobile Team -[phone] (adults)/[phone] (child)  CHILD: [facility] needs assessment team - [phone]      [facility]:   [facility] - [phone]   Walk-in counseling [facility] - [phone]   Walk-in counseling [facility] - [phone]   Crisis Residence [facility] - [phone]  Urgent Care Adult Mental [facility]-[phone] mobile unit/ 24/7 crisis line    National Crisis Numbers:   National Suicide Prevention Lifeline: 4-025-103-TALK (897-067-8472)  Poison Control Center - 7-161-788-4719  [facility]/resources for a list of additional resources (SOS)  Trans Lifeline a hotline for transgender people 4-197-887-6833  The [employer] Project a hotline for LGBT youth 4-321-970-7248  Crisis Text Line: For any crisis 24/7   To: 587171  see www.crisistextline.org  - IF MAKING A CALL FEELS TOO HARD, send a text!         Again thank you for choosing [facility] and please let us know how we can best partner with you to improve you and your family's health.    You may be receiving a survey regarding this appointment. We would love to have your feedback, both positive and negative. The survey is done by an external company, so your answers are anonymous.

## 2023-12-08 NOTE — PROGRESS NOTES
"Virtual Visit Details    Type of service:  Video Visit   Video Start Time: {video visit start/end time for provider to select:538192}  Video End Time:{video visit start/end time for provider to select:671746}    Originating Location (pt. Location): {video visit patient location:878059::\"Home\"}  {PROVIDER LOCATION On-site should be selected for visits conducted from your clinic location or adjoining [facility] hospital, academic office, or other nearby [facility] building. Off-site should be selected for all other provider locations, including home:507960}  Distant Location (provider location):  {virtual location provider:792022}  Platform used for Video Visit: {Virtual Visit Platforms:943563::\"[facility]\"}    "
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
## 2023-12-08 NOTE — TELEPHONE ENCOUNTER
Writer spoke with Domenica,  who confirms they can administer patient's fluphenazine at the group home.     Writer called [facility], [address] to ensure they have prolixin injection in stock. They stated it was last delivered on 12/1 and will be fillable on 12/11. Pharmacy stated they should have this medication in stock.    Domenica confirmed fax number for AVS is: [phone]

## 2023-12-08 NOTE — TELEPHONE ENCOUNTER
Patient scheduled for MTM telephone appointment today.  Patient did not answer x2 attempts. Left voicemail with MTM scheduling line ([phone]) and encouraged pt to reschedule..    Luh KentD, BCPP  Medication Therapy Management Pharmacist  [facility]

## 2023-12-08 NOTE — NURSING NOTE
Is the patient currently in the state of [place]? YES    Visit mode:VIDEO    If the visit is dropped, the patient can be reconnected by: VIDEO VISIT: Text to cell phone:   Telephone Information:   Mobile [phone]       Will anyone else be joining the visit? NO  (If patient encounters technical issues they should call [phone]    How would you like to obtain your AVS? MyChart    Are changes needed to the allergy or medication list? Pt stated no changes to allergies and Pt stated no med changes    Reason for visit: LULU CIDF

## 2023-12-11 ENCOUNTER — TELEPHONE (OUTPATIENT)
Dept: FAMILY MEDICINE | Facility: CLINIC | Age: 33
End: 2023-12-11

## 2023-12-11 ENCOUNTER — TRANSFERRED RECORDS (OUTPATIENT)
Dept: HEALTH INFORMATION MANAGEMENT | Facility: CLINIC | Age: 33
End: 2023-12-11

## 2023-12-11 ENCOUNTER — MYC MEDICAL ADVICE (OUTPATIENT)
Dept: DERMATOLOGY | Facility: CLINIC | Age: 33
End: 2023-12-11

## 2023-12-11 ENCOUNTER — TELEPHONE (OUTPATIENT)
Dept: DERMATOLOGY | Facility: CLINIC | Age: 33
End: 2023-12-11

## 2023-12-11 ENCOUNTER — VIRTUAL VISIT (OUTPATIENT)
Dept: DERMATOLOGY | Facility: CLINIC | Age: 33
End: 2023-12-11
Payer: MEDICARE

## 2023-12-11 ENCOUNTER — PATIENT OUTREACH (OUTPATIENT)
Dept: FAMILY MEDICINE | Facility: CLINIC | Age: 33
End: 2023-12-11

## 2023-12-11 DIAGNOSIS — L70.0 ACNE VULGARIS: Primary | Chronic | ICD-10-CM

## 2023-12-11 PROCEDURE — 99441 PR PHYSICIAN TELEPHONE EVALUATION 5-10 MIN: CPT | Mod: 95 | Performed by: DERMATOLOGY

## 2023-12-11 RX ORDER — DOXYCYCLINE 100 MG/1
100 CAPSULE ORAL EVERY 12 HOURS
Qty: 60 CAPSULE | Refills: 11 | Status: SHIPPED | OUTPATIENT
Start: 2023-12-11 | End: 2024-08-29

## 2023-12-11 RX ORDER — CLINDAMYCIN PHOSPHATE 10 UG/ML
LOTION TOPICAL 2 TIMES DAILY
Qty: 60 ML | Refills: 11 | Status: SHIPPED | OUTPATIENT
Start: 2023-12-11 | End: 2024-08-07

## 2023-12-11 ASSESSMENT — PAIN SCALES - GENERAL: PAINLEVEL: NO PAIN (0)

## 2023-12-11 NOTE — LETTER
12/11/2023         RE: Ayana Prasad  [address]  [facility] [address]        Dear Colleague,    Thank you for referring your patient, Ayana Prasad, to the [facility]. Please see a copy of my visit note below.    [facility] Dermatology Note  Encounter Date: Dec 11, 2023  Store-and-Forward and Telephone ([phone] ). Location of teledermatologist: [facility].  Start time: 2:34. End time: 2:39.    Dermatology Problem List:  1. Severe nodulocystic acne  - doxycycline 100 mg BID, BPO 5% wash, clindamycin  - in reserve: isotretinoin  - on testosterone supplementation for FTM   - prior: tretinoin 0.025%, 0.05%  2. Left upper arm: suspect lipoma    ____________________________________________    Assessment & Plan:     Acne vulgaris: on doxycycline and topicals but getting a lot of irritation from tretinoin. Will stop tretinoin and add clindamycin, and continue remainder of regimen.  - doxycycline 100 mg BID, BPO wash, clindamycin lotion    Procedures Performed:    None    Follow-up: 6-8 months    Staff:     Mark Cleary MD, FAAD   of Dermatology  Department of Dermatology  [facility]    ____________________________________________    CC: Acne (Prakash is being called for an acne follow-up. States it is the same since last seen.)    HPI:  Mr. Ayana Prasad is a(n) 33 year old adult who presents today as a return patient for acne vulgaris    Acne vulgaris - tretinoin not working as well - causing lots of dryness  - on doxycycline and BPO wash    Patient is otherwise feeling well, without additional skin concerns.    Labs Reviewed:  N/A    Physical Exam:  Vitals: There were no vitals taken for this visit.  SKIN: Teledermatology photos were reviewed; image quality and interpretability: acceptable. Image date: 12/11/23.  - acneiform papules on the face, interval improvement  - No other  lesions of concern on areas examined.     Medications:  Current Outpatient Medications   Medication     ACE/ARB/ARNI NOT PRESCRIBED (INTENTIONAL)     amLODIPine (NORVASC) 5 MG tablet     atomoxetine (STRATTERA) 40 MG capsule     benzoyl peroxide 5 % external liquid     blood glucose (NO BRAND SPECIFIED) test strip     clindamycin (CLEOCIN T) 1 % external lotion     clonazePAM (KLONOPIN) 0.5 MG tablet     Continuous Blood Gluc  (FREESTYLE COLTEN 2 READER) ZEINAB     Continuous Blood Gluc Sensor (FREESTYLE COLTEN 2 SENSOR) MISC     doxycycline hyclate (VIBRAMYCIN) 100 MG capsule     empagliflozin (JARDIANCE) 10 MG TABS tablet     fish oil-omega-3 fatty acids 1000 MG capsule     fluPHENAZine (PROLIXIN) 5 MG tablet     fluPHENAZine decanoate (PROLIXIN) 25 MG/ML injection     fluPHENAZine decanoate (PROLIXIN) 25 MG/ML injection     gabapentin (NEURONTIN) 600 MG tablet     insulin glargine (LANTUS PEN) 100 UNIT/ML pen     insulin pen needle (BD SAMANTHA U/F) 32G X 4 MM miscellaneous     lithium (ESKALITH CR/LITHOBID) 450 MG CR tablet     methocarbamol (ROBAXIN) 500 MG tablet     nicotine polacrilex (NICORETTE) 4 MG gum     ondansetron (ZOFRAN ODT) 4 MG ODT tab     pantoprazole (PROTONIX) 40 MG EC tablet     propranolol (INDERAL) 10 MG tablet     QUEtiapine (SEROQUEL) 200 MG tablet     rosuvastatin (CRESTOR) 40 MG tablet     sulindac (CLINORIL) 200 MG tablet     testosterone (ANDROGEL/TESTIM) 50 MG/5GM (1%) topical gel     thin (NO BRAND SPECIFIED) lancets     No current facility-administered medications for this visit.      Past Medical/Surgical History:   Patient Active Problem List   Diagnosis     ADHD (attention deficit hyperactivity disorder)     Bipolar 1 disorder, manic, mild     Marijuana abuse     Polysubstance abuse (H)     GERD (gastroesophageal reflux disease)     Tobacco abuse     Intractable back pain     Optic neuritis     Cauda equina syndrome with neurogenic bladder (H)     Schizoaffective disorder,  bipolar type (H)     PTSD (post-traumatic stress disorder)     Anxiety     Auditory hallucination     Nephrolithiasis     Cyst of left ovary     Borderline personality disorder (H)     Cannabis use disorder, severe, dependence (H)     Depression     Episodic mood disorder (H24)     History of heroin abuse (H)     Moderate episode of recurrent major depressive disorder (H)     Opioid use disorder, severe, dependence (H)     Substance-induced psychotic disorder with hallucinations (H)     Nausea     Overdose     Bella (H)     Urinary retention     Chronic bilateral low back pain without sciatica     AVA (generalized anxiety disorder)     Aggressive behavior     Gender identity disorder     Lumbosacral radiculopathy at L5     DUB (dysfunctional uterine bleeding)     Seizure-like activity (H)     Acanthosis nigricans     Schizoaffective disorder, chronic condition with acute exacerbation (H)     Bipolar affective disorder, mixed, severe, with psychotic behavior (H)     Schizophrenia, schizoaffective, chronic with acute exacerbation (H)     Akathisia     Hypertension, unspecified type     Female-to-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 person     Morbid obesity (H)     Diabetes mellitus, type 2 (H)     Suicidal ideation     Mood disorder due to a general medical condition     Pain of right hand     Acne vulgaris     Past Medical History:   Diagnosis Date     ADHD (attention deficit hyperactivity disorder)      Bipolar 1 disorder      Borderline personality disorder      Cauda equina syndrome      Chronic low back pain      Depression      Diabetes mellitus, type 2 (H) 1/19/2023     GERD (gastroesophageal reflux disease)      h/o TBI (traumatic brain injury)      Hypertension, unspecified type 12/16/2021     Marginal corneal ulcer, left 07/17/2015     Nephrolithiasis      obesity      Polysubstance abuse - methamphetamine, hallucinagen, heroin, marijuana     currently in remission     PONV (postoperative nausea and vomiting)      PTSD  (post-traumatic stress disorder)        CC No referring provider defined for this encounter. on close of this encounter.      Again, thank you for allowing me to participate in the care of your patient.        Sincerely,        Mark Cleary MD

## 2023-12-11 NOTE — NURSING NOTE
Dermatology Rooming Note    Ayana Prasad's goals for this visit include:   Chief Complaint   Patient presents with    Acne     Randall is being called for an acne follow-up. States it is the same since last seen.     Sekou Cortez, EMT

## 2023-12-11 NOTE — PROGRESS NOTES
[facility] Dermatology Note  Encounter Date: Dec 11, 2023  Store-and-Forward and Telephone ([phone] ). Location of teledermatologist: [facility].  Start time: 2:34. End time: 2:39.    Dermatology Problem List:  1. Severe nodulocystic acne  - doxycycline 100 mg BID, BPO 5% wash, clindamycin  - in reserve: isotretinoin  - on testosterone supplementation for FTM   - prior: tretinoin 0.025%, 0.05%  2. Left upper arm: suspect lipoma    ____________________________________________    Assessment & Plan:     Acne vulgaris: on doxycycline and topicals but getting a lot of irritation from tretinoin. Will stop tretinoin and add clindamycin, and continue remainder of regimen.  - doxycycline 100 mg BID, BPO wash, clindamycin lotion    Procedures Performed:    None    Follow-up: 6-8 months    Staff:     Mark Cleary MD, FAAD   of Dermatology  Department of Dermatology  [facility]    ____________________________________________    CC: Acne (Prakash is being called for an acne follow-up. States it is the same since last seen.)    HPI:  Mr. Ayana Prasad is a(n) 33 year old adult who presents today as a return patient for acne vulgaris    Acne vulgaris - tretinoin not working as well - causing lots of dryness  - on doxycycline and BPO wash    Patient is otherwise feeling well, without additional skin concerns.    Labs Reviewed:  N/A    Physical Exam:  Vitals: There were no vitals taken for this visit.  SKIN: Teledermatology photos were reviewed; image quality and interpretability: acceptable. Image date: 12/11/23.  - acneiform papules on the face, interval improvement  - No other lesions of concern on areas examined.     Medications:  Current Outpatient Medications   Medication    ACE/ARB/ARNI NOT PRESCRIBED (INTENTIONAL)    amLODIPine (NORVASC) 5 MG tablet    atomoxetine (STRATTERA) 40 MG capsule    benzoyl peroxide 5 %  external liquid    blood glucose (NO BRAND SPECIFIED) test strip    clindamycin (CLEOCIN T) 1 % external lotion    clonazePAM (KLONOPIN) 0.5 MG tablet    Continuous Blood Gluc  (FREESTYLE COLTEN 2 READER) ZEINAB    Continuous Blood Gluc Sensor (FREESTYLE COLTEN 2 SENSOR) MISC    doxycycline hyclate (VIBRAMYCIN) 100 MG capsule    empagliflozin (JARDIANCE) 10 MG TABS tablet    fish oil-omega-3 fatty acids 1000 MG capsule    fluPHENAZine (PROLIXIN) 5 MG tablet    fluPHENAZine decanoate (PROLIXIN) 25 MG/ML injection    fluPHENAZine decanoate (PROLIXIN) 25 MG/ML injection    gabapentin (NEURONTIN) 600 MG tablet    insulin glargine (LANTUS PEN) 100 UNIT/ML pen    insulin pen needle (BD SAMANTHA U/F) 32G X 4 MM miscellaneous    lithium (ESKALITH CR/LITHOBID) 450 MG CR tablet    methocarbamol (ROBAXIN) 500 MG tablet    nicotine polacrilex (NICORETTE) 4 MG gum    ondansetron (ZOFRAN ODT) 4 MG ODT tab    pantoprazole (PROTONIX) 40 MG EC tablet    propranolol (INDERAL) 10 MG tablet    QUEtiapine (SEROQUEL) 200 MG tablet    rosuvastatin (CRESTOR) 40 MG tablet    sulindac (CLINORIL) 200 MG tablet    testosterone (ANDROGEL/TESTIM) 50 MG/5GM (1%) topical gel    thin (NO BRAND SPECIFIED) lancets     No current facility-administered medications for this visit.      Past Medical/Surgical History:   Patient Active Problem List   Diagnosis    ADHD (attention deficit hyperactivity disorder)    Bipolar 1 disorder, manic, mild    Marijuana abuse    Polysubstance abuse (H)    GERD (gastroesophageal reflux disease)    Tobacco abuse    Intractable back pain    Optic neuritis    Cauda equina syndrome with neurogenic bladder (H)    Schizoaffective disorder, bipolar type (H)    PTSD (post-traumatic stress disorder)    Anxiety    Auditory hallucination    Nephrolithiasis    Cyst of left ovary    Borderline personality disorder (H)    Cannabis use disorder, severe, dependence (H)    Depression    Episodic mood disorder (H24)    History of heroin  abuse (H)    Moderate episode of recurrent major depressive disorder (H)    Opioid use disorder, severe, dependence (H)    Substance-induced psychotic disorder with hallucinations (H)    Nausea    Overdose    Bella (H)    Urinary retention    Chronic bilateral low back pain without sciatica    AVA (generalized anxiety disorder)    Aggressive behavior    Gender identity disorder    Lumbosacral radiculopathy at L5    DUB (dysfunctional uterine bleeding)    Seizure-like activity (H)    Acanthosis nigricans    Schizoaffective disorder, chronic condition with acute exacerbation (H)    Bipolar affective disorder, mixed, severe, with psychotic behavior (H)    Schizophrenia, schizoaffective, chronic with acute exacerbation (H)    Akathisia    Hypertension, unspecified type    Female-to-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 person    Morbid obesity (H)    Diabetes mellitus, type 2 (H)    Suicidal ideation    Mood disorder due to a general medical condition    Pain of right hand    Acne vulgaris     Past Medical History:   Diagnosis Date    ADHD (attention deficit hyperactivity disorder)     Bipolar 1 disorder     Borderline personality disorder     Cauda equina syndrome     Chronic low back pain     Depression     Diabetes mellitus, type 2 (H) 1/19/2023    GERD (gastroesophageal reflux disease)     h/o TBI (traumatic brain injury)     Hypertension, unspecified type 12/16/2021    Marginal corneal ulcer, left 07/17/2015    Nephrolithiasis     obesity     Polysubstance abuse - methamphetamine, hallucinagen, heroin, marijuana     currently in remission    PONV (postoperative nausea and vomiting)     PTSD (post-traumatic stress disorder)        CC No referring provider defined for this encounter. on close of this encounter.

## 2023-12-11 NOTE — TELEPHONE ENCOUNTER
Patient Quality Outreach    Type of outreach:    Chart review performed, no outreach needed.  After reviewing patients chart I noticed she is scheduled for a Px on 01/03/2024  HM/Quality Measures will be addressed at that time.     Deena Ackerman MA

## 2023-12-11 NOTE — TELEPHONE ENCOUNTER
[facility] Call Center    Phone Message    May a detailed message be left on voicemail: yes     Reason for Call: Patient has an in person appt today and would like to switch to virtual if possible - writer unable to switch - please call back [phone] Thank you    Action Taken: Message routed to:  Adult Clinics: Dermatology p [id number]    Travel Screening: Not Applicable

## 2023-12-11 NOTE — TELEPHONE ENCOUNTER
Patient Quality Outreach    Patient is due for the following:   Diabetes -  A1C, Eye Exam, and BP Check  Hypertension -  BP check  Cervical Cancer Screening - PAP Needed  Physical Annual Wellness Visit      Topic Date Due    Polio Vaccine (2 of 3 - 4-dose series) 08/29/1995    Hepatitis A Vaccine (2 of 2 - Risk 2-dose series) 04/10/2015    Pneumococcal Vaccine (2 - PCV) 09/04/2021    COVID-19 Vaccine (5 - 2023-24 season) 09/01/2023       Type of outreach:    Chart review performed, no outreach needed.    Patient has appointment on 12/20/2023, added to appointment note.  Questions for provider review:    None           Radha Engle  Chart routed to Care Team.

## 2023-12-11 NOTE — TELEPHONE ENCOUNTER
Spoke with patient. THis is a follow up for acne and treatment is going well per patient. Appointment changed to virtual and mychart message for patient to reply to with photos.     Isabel Phillips LPN

## 2023-12-12 NOTE — TELEPHONE ENCOUNTER
Writer attempted to call Domenica to inquire whether patient was able to get his injection yesterday. LVM requesting a call back at the main clinic number.

## 2023-12-12 NOTE — TELEPHONE ENCOUNTER
[facility] Call Center    Phone Message    May a detailed message be left on voicemail: yes     Reason for Call: Other: Domenica, the patient's  called back, returning the call regarding the patient's injection. They confirmed that the patient was able to receive their injection yesterday (12/11).     Action Taken: Message routed to:  Other: Deena Clark    Travel Screening: Not Applicable

## 2023-12-14 ENCOUNTER — TELEPHONE (OUTPATIENT)
Dept: FAMILY MEDICINE | Facility: CLINIC | Age: 33
End: 2023-12-14
Payer: MEDICARE

## 2023-12-14 NOTE — TELEPHONE ENCOUNTER
MTM appointment no showed, we made one more attempt to reschedule.     Routing back to referring provider and [facility] pharmacist.     [employer] Message Sent    Alexx Paolmino MTM

## 2023-12-19 ENCOUNTER — VIRTUAL VISIT (OUTPATIENT)
Dept: FAMILY MEDICINE | Facility: CLINIC | Age: 33
End: 2023-12-19
Payer: MEDICARE

## 2023-12-19 DIAGNOSIS — F64.9 GENDER INCONGRUENCE: Primary | ICD-10-CM

## 2023-12-19 DIAGNOSIS — Z78.9 FEMALE-TO-MALE TRANSGENDER PERSON: ICD-10-CM

## 2023-12-19 PROCEDURE — 99214 OFFICE O/P EST MOD 30 MIN: CPT | Mod: 95

## 2023-12-19 RX ORDER — TESTOSTERONE GEL, 1% 10 MG/G
50 GEL TRANSDERMAL DAILY
Qty: 30 PACKET | Refills: 0 | Status: SHIPPED | OUTPATIENT
Start: 2023-12-19 | End: 2024-04-13

## 2023-12-19 NOTE — PROGRESS NOTES
Prakash is a 33 year old who is being evaluated via a billable video visit.      How would you like to obtain your AVS? MyChart  If the video visit is dropped, the invitation should be resent by: Text to cell phone: [phone]  Will anyone else be joining your video visit? No        Assessment & Plan     Gender incongruence  Was re-started on androgel 1.62% 2 pumps daily  (40.25mg) in July of this year after a gap in HRT. Was lost to follow-up primarily due to frequent psychiatric hospitalizations. Was prescribed androgel 1% packets (50mg) while in the hospital, and has been sometimes using either forumulation. Seeing positive changes (voice deepening) but hoping for more facial hair. Reviewed that he has only been on T somewhat consistently (does miss doses sometimes) for 5 months, and that we expect development of facial hair in 6-12 months with max effect in 4-5 years. Can consider increase in dose (per his request) after we see lab results. Has not had HRT labs done since prior to re-starting T in July. Will order labs to be drawn tomorrow, 12/20, at PCP visit in [place]. Lipid panel, LFTs last checked 1 month ago; managed by PCP.   - testosterone total   - hgb  - testosterone (ANDROGEL/TESTIM) 50 MG/5GM (1%) topical gel; Place 1 packet (50 mg of testosterone) onto the skin daily    Return in about 3 months (around 3/19/2024) for using a video visit - HRT F/U .    A. Shanice Singh MD  [facility] LINETTE Randall is a 33 year old, presenting for the following health issues:  RECHECK        12/19/2023     9:49 AM   Additional Questions   Roomed by Galen   Accompanied by Self       HPI     Mental health better right now.     Hoping to be able to increase the T gel dosing. Wants different effects, especially facial hair.     Gel going alright, miss it once in a while but that is rare.     Using packets of 50mg T gel or 3 pumps from a bottle.     Voice now is lower than it used to be.      Not menstruating, last time was in November but it was just light spotting. Prior to that hadn't had it in a while.       Virtual visits are better.   3 month follow-up for HRT.     Labs to be done at clinic tomorrow in [place].         Objective         Vitals:  No vitals were obtained today due to virtual visit.    Physical Exam   GENERAL: Healthy, alert and no distress  EYES: Eyes grossly normal to inspection.  No discharge or erythema, or obvious scleral/conjunctival abnormalities.  RESP: No audible wheeze, cough, or visible cyanosis.  No visible retractions or increased work of breathing.    SKIN: Visible skin clear. No significant rash, abnormal pigmentation or lesions.  NEURO: Cranial nerves grossly intact.  Mentation and speech appropriate for age.  PSYCH: Mentation appears normal, affect normal/bright, judgement and insight intact, normal speech and appearance well-groomed.       Video-Visit Details    Type of service:  Video Visit   Video Start Time: 9:50  Video End Time: 10:05    Originating Location (pt. Location): Home    Distant Location (provider location):  On-site  Platform used for Video Visit: Evens

## 2023-12-19 NOTE — Clinical Note
Reggie! I saw Randall virtually today for HRT follow-up; I ordered some labs for our monitoring; he prefers to have them drawn at your clinic, so if you would be able to facilitate that tomorrow at his visit I would appreciate it!

## 2023-12-21 NOTE — TELEPHONE ENCOUNTER
Patient Quality Outreach    Type of outreach:    Chart review performed, no outreach needed.    Patient cancelled previous appointment, new preventative visit is now on 01/03/2024.  Appointment notes have been updated.      Patient new preventive visit on 02/14/2024.  Appointment notes updated.

## 2023-12-28 DIAGNOSIS — F25.9 SCHIZOAFFECTIVE DISORDER, CHRONIC CONDITION WITH ACUTE EXACERBATION (H): ICD-10-CM

## 2023-12-28 DIAGNOSIS — F41.9 ANXIETY: ICD-10-CM

## 2023-12-28 DIAGNOSIS — F31.64 BIPOLAR AFFECTIVE DISORDER, MIXED, SEVERE, WITH PSYCHOTIC BEHAVIOR (H): ICD-10-CM

## 2023-12-28 DIAGNOSIS — M51.369 DDD (DEGENERATIVE DISC DISEASE), LUMBAR: ICD-10-CM

## 2023-12-28 DIAGNOSIS — G89.29 CHRONIC LEFT-SIDED LOW BACK PAIN WITH LEFT-SIDED SCIATICA: ICD-10-CM

## 2023-12-28 DIAGNOSIS — M54.42 CHRONIC LEFT-SIDED LOW BACK PAIN WITH LEFT-SIDED SCIATICA: ICD-10-CM

## 2023-12-28 DIAGNOSIS — F25.0 SCHIZOAFFECTIVE DISORDER, BIPOLAR TYPE (H): ICD-10-CM

## 2023-12-29 ENCOUNTER — VIRTUAL VISIT (OUTPATIENT)
Dept: PSYCHIATRY | Facility: CLINIC | Age: 33
End: 2023-12-29
Attending: NURSE PRACTITIONER
Payer: MEDICARE

## 2023-12-29 ENCOUNTER — TELEPHONE (OUTPATIENT)
Dept: PSYCHIATRY | Facility: CLINIC | Age: 33
End: 2023-12-29
Payer: MEDICARE

## 2023-12-29 DIAGNOSIS — F39 MOOD DISORDER (H): ICD-10-CM

## 2023-12-29 DIAGNOSIS — F41.9 ANXIETY: ICD-10-CM

## 2023-12-29 DIAGNOSIS — F43.10 PTSD (POST-TRAUMATIC STRESS DISORDER): ICD-10-CM

## 2023-12-29 DIAGNOSIS — F90.9 ATTENTION DEFICIT HYPERACTIVITY DISORDER (ADHD), UNSPECIFIED ADHD TYPE: Primary | ICD-10-CM

## 2023-12-29 PROCEDURE — 99215 OFFICE O/P EST HI 40 MIN: CPT | Mod: 95 | Performed by: NURSE PRACTITIONER

## 2023-12-29 PROCEDURE — 96127 BRIEF EMOTIONAL/BEHAV ASSMT: CPT | Mod: VID | Performed by: NURSE PRACTITIONER

## 2023-12-29 PROCEDURE — 99417 PROLNG OP E/M EACH 15 MIN: CPT | Mod: 95 | Performed by: NURSE PRACTITIONER

## 2023-12-29 RX ORDER — CLONAZEPAM 0.5 MG/1
0.25 TABLET ORAL DAILY PRN
Qty: 2.5 TABLET | Refills: 0 | Status: SHIPPED | OUTPATIENT
Start: 2024-02-09 | End: 2024-02-20

## 2023-12-29 RX ORDER — FLUPHENAZINE DECANOATE 25 MG/ML
25 INJECTION, SOLUTION INTRAMUSCULAR; SUBCUTANEOUS
Qty: 5 ML | Refills: 0 | Status: SHIPPED | OUTPATIENT
Start: 2023-12-29 | End: 2024-03-08

## 2023-12-29 RX ORDER — PROPRANOLOL HYDROCHLORIDE 10 MG/1
10 TABLET ORAL 3 TIMES DAILY
Qty: 90 TABLET | Refills: 2 | Status: SHIPPED | OUTPATIENT
Start: 2023-12-29 | End: 2024-03-08

## 2023-12-29 RX ORDER — QUETIAPINE FUMARATE 200 MG/1
200 TABLET, FILM COATED ORAL AT BEDTIME
Qty: 30 TABLET | Refills: 2 | Status: SHIPPED | OUTPATIENT
Start: 2023-12-29 | End: 2024-03-08

## 2023-12-29 RX ORDER — PROPRANOLOL HYDROCHLORIDE 10 MG/1
TABLET ORAL
Qty: 90 TABLET | Refills: 10 | OUTPATIENT
Start: 2023-12-29

## 2023-12-29 RX ORDER — QUETIAPINE FUMARATE 200 MG/1
200 TABLET, FILM COATED ORAL
Qty: 30 TABLET | Refills: 10 | OUTPATIENT
Start: 2023-12-29

## 2023-12-29 RX ORDER — DEXTROAMPHETAMINE SACCHARATE, AMPHETAMINE ASPARTATE MONOHYDRATE, DEXTROAMPHETAMINE SULFATE AND AMPHETAMINE SULFATE 3.75; 3.75; 3.75; 3.75 MG/1; MG/1; MG/1; MG/1
15 CAPSULE, EXTENDED RELEASE ORAL DAILY
Qty: 30 CAPSULE | Refills: 0 | Status: SHIPPED | OUTPATIENT
Start: 2023-12-29 | End: 2024-01-28

## 2023-12-29 RX ORDER — CLONAZEPAM 0.5 MG/1
0.25 TABLET ORAL DAILY PRN
Qty: 2.5 TABLET | Refills: 0 | Status: SHIPPED | OUTPATIENT
Start: 2024-01-10 | End: 2024-03-08

## 2023-12-29 RX ORDER — LITHIUM CARBONATE 450 MG
900 TABLET, EXTENDED RELEASE ORAL AT BEDTIME
Qty: 60 TABLET | Refills: 2 | Status: SHIPPED | OUTPATIENT
Start: 2023-12-29 | End: 2024-02-27

## 2023-12-29 RX ORDER — LITHIUM CARBONATE 450 MG
TABLET, EXTENDED RELEASE ORAL
Qty: 60 TABLET | Refills: 10 | OUTPATIENT
Start: 2023-12-29

## 2023-12-29 ASSESSMENT — PATIENT HEALTH QUESTIONNAIRE - PHQ9
SUM OF ALL RESPONSES TO PHQ QUESTIONS 1-9: 0
SUM OF ALL RESPONSES TO PHQ QUESTIONS 1-9: 0
10. IF YOU CHECKED OFF ANY PROBLEMS, HOW DIFFICULT HAVE THESE PROBLEMS MADE IT FOR YOU TO DO YOUR WORK, TAKE CARE OF THINGS AT HOME, OR GET ALONG WITH OTHER PEOPLE: NOT DIFFICULT AT ALL

## 2023-12-29 ASSESSMENT — PAIN SCALES - GENERAL: PAINLEVEL: NO PAIN (0)

## 2023-12-29 NOTE — TELEPHONE ENCOUNTER
----- Message from BROOKLYN Calvin CNP sent at 12/29/2023  8:30 AM CST -----  Regarding: would you confirm with  on med adm?  I am seeing this pt this afternoon. Would you contact  to see if treatment plan was followed?    Medication:   -Have Prolixin injection on 12/11 at group home.  -On 12/13, discontinue scheduled oral Prolixin 5 mg daily.  -as needed Prolixin is discontinued today as you are not needing the medication.  -May take Klonopin 0.25 mg daily as needed for panic attack. You have 5 tabs per month.  -Continue all other medication regimen for now. Monitor restless leg.    Or any feedback they have?    Thank you    Follow Up:  Writer spoke with , Domenica who confirms that patient received Prolixin injection on 12/11 and again 2 weeks later (unable to confirm exact date). Domenica states she will confirm dosage administered and let us know.     Domenica also confirms that PO Prolixin was discontinued and patient now has PRN Klonopin with 5 tablets per month.     Domenica states that patient has been doing very well, though suspects that patient has been using marijuana. Domenica denies any other questions or concerns at this time.

## 2023-12-29 NOTE — PATIENT INSTRUCTIONS
-Discontinue Strattera.  -Start Adderall XR 15 mg daily for ADHD. Monitor psychosis, anxiety, sleep difficulties.  -Continue all other medication regimen.    -Please have group home check blood pressure 2 times a week and report back to mattie. If this is consistently over 140/90, you would need to follow up with Becki.    -Follow up with Becki or transfer care to ACT team provider.    Your next appointment is scheduled on 3/8/2024 (Fri) at 1pm. But if you have established ACT team provider, please cancel the appointment.      **For crisis resources, please see the information at the end of this document**   Patient Education    Thank you for coming to the [facility] [place] [facility].     Lab Testing:  If you had lab testing today and your results are reassuring or normal they will be mailed to you or sent through [facility] within 7 days. If the lab tests need quick action we will call you with the results. The phone number we will call with results is # [phone]. If this is not the best number please call our clinic and change the number.     Medication Refills:  If you need any refills please call your pharmacy and they will contact us. Our fax number for refills is [phone].   Three business days of notice are needed for general medication refill requests.   Five business days of notice are needed for controlled substance refill requests.   If you need to change to a different pharmacy, please contact the new pharmacy directly. The new pharmacy will help you get your medications transferred.     Contact Us:  Please call [phone] during business hours (8-5:00 M-F).   If you have medication related questions after clinic hours, or on the weekend, please call [phone].     Financial Assistance [phone]   Medical Records [phone]       MENTAL HEALTH CRISIS RESOURCES:  For a emergency help, please call 911 or go to the nearest Emergency Department.      Emergency Walk-In Options:   [facility] Unit @ [facility] ([place]): [phone] - Specialized mental health emergency area designed to be calming  [facility] ([place]): [phone]  [facility] ([place]): [phone]  [facility] ([place]): [phone]    County Crisis Information:   [place]: [phone]  [place]: [phone]  [place] ([place]) - Adult: [phone]     Child: [phone]  [place] - Adult: [phone]     Child: [phone]  [place]: [phone]  List of all [facility] resources:   https://[place].[facility]/dhs/people-we-serve/adults/health-care/mental-health/resources/crisis-contacts.jsp    National Crisis Information:   Crisis Text Line: Text  [place]  to 700198  Suicide & Crisis Lifeline: 988  National Suicide Prevention Lifeline: 7-493-330-TALK (1-572.731.4481)       For online chat options, visit https://suicidepreventionlifeline.org/chat/  Poison Control Center: 1-819.486.7757  Trans Lifeline: 1-784.262.9214 - Hotline for transgender people of all ages  The [employer]: 4-346-591-4741 - Hotline for LGBT youth     For Non-Emergency Support:   Fast Tracker: Mental Health & Substance Use Disorder Resources -   https://www.[employer].org/

## 2023-12-29 NOTE — NURSING NOTE
Is the patient currently in the state of [place]? YES    Visit mode:VIDEO    If the visit is dropped, the patient can be reconnected by: VIDEO VISIT: Text to cell phone:   Telephone Information:   Mobile [phone]       Will anyone else be joining the visit? NO  (If patient encounters technical issues they should call [phone])    How would you like to obtain your AVS? MyChart    Are changes needed to the allergy or medication list? No    Reason for visit: RECHECK    Kezia VANN
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
## 2023-12-29 NOTE — TELEPHONE ENCOUNTER
Medication requested: gabapentin (NEURONTIN) 600 MG tablet  Last refilled: 11/27/2023  Qty: 180/0       Last seen: 12/8/2023  [facility]     Regine Last APRN CNP     RTC:   2 weeks   Cancel: 0  No-show: 0  Next appt: 12/29/23       Refill decision: Refill pended and routed to the provider for review/determination due to Not on protocol

## 2023-12-29 NOTE — PROGRESS NOTES
"Virtual Visit Details    Type of service:  Video Visit   Video Start Time: {video visit start/end time for provider to select:485510}  Video End Time:{video visit start/end time for provider to select:737207}    Originating Location (pt. Location): {video visit patient location:417870::\"Home\"}  {PROVIDER LOCATION On-site should be selected for visits conducted from your clinic location or adjoining [facility] hospital, academic office, or other nearby [facility] building. Off-site should be selected for all other provider locations, including home:443129}  Distant Location (provider location):  {virtual location provider:668230}  Platform used for Video Visit: {Virtual Visit Platforms:731859::\"[place]\"}  "

## 2024-01-01 DIAGNOSIS — E11.65 TYPE 2 DIABETES MELLITUS WITH HYPERGLYCEMIA, WITHOUT LONG-TERM CURRENT USE OF INSULIN (H): Primary | ICD-10-CM

## 2024-01-01 DIAGNOSIS — F25.9 SCHIZOAFFECTIVE DISORDER, CHRONIC CONDITION WITH ACUTE EXACERBATION (H): ICD-10-CM

## 2024-01-01 DIAGNOSIS — F41.9 ANXIETY: ICD-10-CM

## 2024-01-02 RX ORDER — CLONAZEPAM 0.5 MG/1
0.25 TABLET ORAL DAILY PRN
Qty: 2.5 TABLET | OUTPATIENT
Start: 2024-01-02

## 2024-01-02 NOTE — TELEPHONE ENCOUNTER
Writer attempted to call patient's Domenica to confirm last DOMINGUEZ Prolixin injection as well as confirm fax number for group home to send AVS. LVM requesting a call back at the main clinic number.

## 2024-01-05 ENCOUNTER — TELEPHONE (OUTPATIENT)
Dept: PSYCHIATRY | Facility: CLINIC | Age: 34
End: 2024-01-05
Payer: MEDICARE

## 2024-01-05 DIAGNOSIS — F41.9 ANXIETY: ICD-10-CM

## 2024-01-05 RX ORDER — CLONAZEPAM 0.5 MG/1
0.25 TABLET ORAL DAILY PRN
Qty: 2.5 TABLET | Refills: 0 | Status: CANCELLED | OUTPATIENT
Start: 2024-02-09

## 2024-01-05 NOTE — TELEPHONE ENCOUNTER
[facility] Call Center    Phone Message    May a detailed message be left on voicemail: yes     Reason for Call: Other: Patient is requesting early refills for klonopin medications and says they are currently out and would like to speak to a nurse.     Action Taken: Message routed to:  Other: P PSYCHIATRY NURSE-[facility]    Travel Screening: Not Applicable

## 2024-01-05 NOTE — TELEPHONE ENCOUNTER
Writer called Domenica and she confirmed patient's last injection was on Tuesday, 1/2. She also confirmed fax number is the same: [phone]. They had asked patient to print of his current medication list after last visit and they did see the order to obtain BPs.     Last BP documented was Tuesday 1/2 at 8:25PM and it was 145/98

## 2024-01-05 NOTE — TELEPHONE ENCOUNTER
Writer called patient to follow up on call. Patient states group home has the 1/10 refill and he is wondering if he could take a dose from that prescription. Writer advised that provider would like him to stick with the plan for 5 tablets of Klonopin per month.     Patient states he has not had anxiety in a while (approximately 2 weeks) and denies any triggers and states it has been a normal day. Writer inquired whether this could be related to restarting Adderall, and patient does not feel this is the case. Writer encouraged patient to utilize TIPP skills, but patient is not confident these will be helpful and asks if provider has other suggestions for PRNs.     Patient states while inpatient, he had scheduled propranolol TID and had an additional 2 PRN doses and is wondering if provider would be okay with him trying this.     Patient also states he had his injection on 1/2 and reports that he signed an VLA for the ACT team.

## 2024-01-05 NOTE — TELEPHONE ENCOUNTER
Writer spoke with provider who advised they are okay with patient taking one PRN dose of Propranolol 10 mg if BP >90/60 and P>60, but if his BP is still over 140/90, he would need to follow up with PCP or Adderall may need to be discontinued.     Writer called patient to discuss the above. Patient stated he rescheduled with PCP for February. Patient states group home staff aren't currently able to obtain a blood pressure and pulse as their machine is out of batteries, but they will try to do this before the end of the day. Patient will call or send [employer] message if they are able to get this.    Writer will also plan to follow up with CM supervisor (contact on blue sticky) to check on status of  ACT service is since patient reported he signed an VAL.

## 2024-01-16 RX ORDER — FLUPHENAZINE DECANOATE 25 MG/ML
25 INJECTION, SOLUTION INTRAMUSCULAR; SUBCUTANEOUS
Qty: 5 ML | Refills: 11 | OUTPATIENT
Start: 2024-01-16

## 2024-01-16 NOTE — TELEPHONE ENCOUNTER
Medication requested: fluPHENAZine decanoate (PROLIXIN) 25 MG/ML injection   Date last ordered: 12/29/2023 Qty: 5ml Refills: 0             Sig - Route: Inject 1 mL (25 mg) into the muscle every 14 days - Intramuscular  Sent to pharmacy as:  fluPHENAZine Decanoate 25 MG/ML Injection Solution (PROLIXIN)  Class: E-Prescribe  ------------------------------       Medication unable to be refilled by RN due to criteria not met as indicated.                 []Eligibility - not seen in the last year              []Supervision - no future appointment              []Compliance - no shows, cancellations or lapse in therapy              []Verification - order discrepancy              []Controlled medication              []Medication not included in policy              []90-day supply request              [x]Other: request too soon

## 2024-01-23 ENCOUNTER — TELEPHONE (OUTPATIENT)
Dept: PSYCHIATRY | Facility: CLINIC | Age: 34
End: 2024-01-23
Payer: MEDICARE

## 2024-01-23 NOTE — TELEPHONE ENCOUNTER
Patient called to ask if they could stop taking quetiapine due to daytime sedation. He states that it does not help him fall asleep because it hasn't kicked in yet. Writer suggested trying to take it at dinner time to see if that will help him be more tired at bedtime and less sedated in the morning. Patient agrees with this plan. Writer will call and check in next week to see how it worked.

## 2024-01-25 DIAGNOSIS — F41.9 ANXIETY: ICD-10-CM

## 2024-01-25 RX ORDER — PROPRANOLOL HYDROCHLORIDE 10 MG/1
TABLET ORAL
Qty: 90 TABLET | Refills: 10 | OUTPATIENT
Start: 2024-01-25

## 2024-01-25 RX ORDER — QUETIAPINE FUMARATE 200 MG/1
TABLET, FILM COATED ORAL
Qty: 30 TABLET | Refills: 10 | OUTPATIENT
Start: 2024-01-25

## 2024-01-25 RX ORDER — GABAPENTIN 600 MG/1
1200 TABLET ORAL 3 TIMES DAILY
Qty: 180 TABLET | Refills: 0 | Status: SHIPPED | OUTPATIENT
Start: 2024-01-30 | End: 2024-03-08

## 2024-01-25 NOTE — TELEPHONE ENCOUNTER
propranolol (INDERAL) 10 MG tablet 90 tablet 2 12/29/2023 - No  Sig - Route: Take 1 tablet (10 mg) by mouth 3 times daily - Oral  Sent to pharmacy as: Propranolol HCl 10 MG Oral Tablet (INDERAL)    QUEtiapine (SEROQUEL) 200 MG tablet 30 tablet 2 12/29/2023 - No  Sig - Route: Take 1 tablet (200 mg) by mouth at bedtime - Oral  Sent to pharmacy as: QUEtiapine Fumarate 200 MG Oral Tablet (SEROquel)    Medication unable to be refilled by RN due to criteria not met as indicated.                 []Eligibility - not seen in the last year              []Supervision - no future appointment              []Compliance - no shows, cancellations or lapse in therapy              []Verification - order discrepancy              []Controlled medication              []Medication not included in policy              []90-day supply request              [x]Other: Request too soon, refills on file

## 2024-01-27 ENCOUNTER — MYC MEDICAL ADVICE (OUTPATIENT)
Dept: FAMILY MEDICINE | Facility: CLINIC | Age: 34
End: 2024-01-27
Payer: MEDICARE

## 2024-01-27 DIAGNOSIS — Z72.0 TOBACCO ABUSE: Primary | ICD-10-CM

## 2024-01-28 ENCOUNTER — MYC REFILL (OUTPATIENT)
Dept: PSYCHIATRY | Facility: CLINIC | Age: 34
End: 2024-01-28
Payer: MEDICARE

## 2024-01-28 DIAGNOSIS — F90.9 ATTENTION DEFICIT HYPERACTIVITY DISORDER (ADHD), UNSPECIFIED ADHD TYPE: ICD-10-CM

## 2024-01-29 ENCOUNTER — TELEPHONE (OUTPATIENT)
Dept: PSYCHIATRY | Facility: CLINIC | Age: 34
End: 2024-01-29
Payer: MEDICARE

## 2024-01-29 ENCOUNTER — TELEPHONE (OUTPATIENT)
Dept: NURSING | Facility: CLINIC | Age: 34
End: 2024-01-29
Payer: MEDICARE

## 2024-01-29 RX ORDER — DEXTROAMPHETAMINE SACCHARATE, AMPHETAMINE ASPARTATE MONOHYDRATE, DEXTROAMPHETAMINE SULFATE AND AMPHETAMINE SULFATE 3.75; 3.75; 3.75; 3.75 MG/1; MG/1; MG/1; MG/1
15 CAPSULE, EXTENDED RELEASE ORAL DAILY
Qty: 30 CAPSULE | Refills: 0 | Status: SHIPPED | OUTPATIENT
Start: 2024-01-29 | End: 2024-02-23

## 2024-01-29 RX ORDER — NICOTINE 21 MG/24HR
1 PATCH, TRANSDERMAL 24 HOURS TRANSDERMAL EVERY 24 HOURS
Qty: 30 PATCH | Refills: 3 | Status: SHIPPED | OUTPATIENT
Start: 2024-01-29 | End: 2024-08-07

## 2024-01-29 NOTE — TELEPHONE ENCOUNTER
Addressed concern in previous encounter.  Prakash said prescriptions have been sent to correct pharmacy.

## 2024-01-29 NOTE — TELEPHONE ENCOUNTER
Health Call Center    Phone Message    May a detailed message be left on voicemail: yes     Reason for Call: Medication Refill Request    Has the patient contacted the pharmacy for the refill? Yes   Name of medication being requested: Adderall 15mg  Provider who prescribed the medication: Regine Last  Pharmacy:    [address]   Date medication is needed: Patient is completely out of this medication.    Action Taken: Message routed to:  Other: [facility] Nurse [place]    Travel Screening: Not Applicable

## 2024-01-29 NOTE — TELEPHONE ENCOUNTER
"Last seen: 12/29/2023  RTC: \"with PCP or ACT provider in 3-4 week\"   Cancel: 0  No-show: 0  Next appt: 03/08/2024     Incoming refill from Patient via [employer]    Medication requested:   Pending Prescriptions:                       Disp   Refills    amphetamine-dextroamphetamine (ADDERALL X*30 cap*0            Sig: Take 1 capsule (15 mg) by mouth daily        Last refill per       From chart note:   -Start Adderall XR 15 mg daily for ADHD        Medication unable to be refilled by RN due to criteria not met as indicated.                 []Eligibility - not seen in the last year              []Supervision - no future appointment              []Compliance - no shows, cancellations or lapse in therapy              []Verification - order discrepancy              [x]Controlled medication              []Medication not included in policy              []90-day supply request              []Other:      "

## 2024-01-29 NOTE — TELEPHONE ENCOUNTER
Rx Refill note     I received rx refill request for amphetamine-dextroamphetamine XR 15mg, #30, sig 1 cap po qday - as I am a psychiatric prescriber of the day for the clinic today.     Reviewed portions of Prakash Prasad's medical record, including most recent psychiatric progress note by Syed WHITE APRN, CNP, of 12/29/2023. Reviewed notes in this encounter and appreciated [place]  data. Follow-up with BROOKLYN Calvin CNP, is not yet scheduled - in part as Prakash is planning to transfer psychiatric treatment to an ACT team (per 12/29/2023 note, Prakash's  indicated that the only remaining step was for ROIs to be signed by Prakash).      Will order amphetamine-dextroamphetamine XR 15mg, #30, sig 1 cap po qday, R-0.     Deepak Marinelli MD

## 2024-01-29 NOTE — TELEPHONE ENCOUNTER
Writer returned call to patient who said that everything was fine and that nothing needed to be changed in regards to medications or pharmacies.

## 2024-01-29 NOTE — TELEPHONE ENCOUNTER
[facility] Call [facility]    Phone Message    May a detailed message be left on voicemail: yes     Reason for Call: Other: Pt called to notify the nurse of the new pharmacy he's using. NEW PHARMACY: [facility] on [address] in [place]. Pt would like a call back.     Action Taken: Other: [facility] psych pool    Travel Screening: Not Applicable

## 2024-01-29 NOTE — TELEPHONE ENCOUNTER
[facility] Call Center    Phone Message    May a detailed message be left on voicemail: yes     Reason for Call: Medication Refill Request    Has the patient contacted the pharmacy for the refill? Yes   Name of medication being requested: Adderall  Provider who prescribed the medication: Regine Last  Pharmacy: [address]   Date medication is needed: Calling to send prescription/new script to a different pharmacy      Action Taken: Message routed to:  Other: P PSYCHIATRY NURSE-P    Travel Screening: Not Applicable

## 2024-01-29 NOTE — TELEPHONE ENCOUNTER
[facility] calling to request pt's aderall script tot be sent to alternative pharmacy. RN informed  that this was not prescribed by  and transferred  to psychiatrist's office to discuss a change in pharmacy. RN gave  psychiatry number as well [phone].     Jie Walters RN on 1/29/2024 at 2:29 PM

## 2024-01-30 ENCOUNTER — MYC MEDICAL ADVICE (OUTPATIENT)
Dept: PSYCHIATRY | Facility: CLINIC | Age: 34
End: 2024-01-30
Payer: MEDICARE

## 2024-02-11 ENCOUNTER — HEALTH MAINTENANCE LETTER (OUTPATIENT)
Age: 34
End: 2024-02-11

## 2024-02-12 ENCOUNTER — TELEPHONE (OUTPATIENT)
Dept: DERMATOLOGY | Facility: CLINIC | Age: 34
End: 2024-02-12

## 2024-02-12 ENCOUNTER — MYC MEDICAL ADVICE (OUTPATIENT)
Dept: PSYCHIATRY | Facility: CLINIC | Age: 34
End: 2024-02-12

## 2024-02-12 ENCOUNTER — TELEPHONE (OUTPATIENT)
Dept: PSYCHIATRY | Facility: CLINIC | Age: 34
End: 2024-02-12

## 2024-02-12 DIAGNOSIS — L70.0 ACNE VULGARIS: Chronic | ICD-10-CM

## 2024-02-12 RX ORDER — DOXYCYCLINE 100 MG/1
100 CAPSULE ORAL EVERY 12 HOURS
Qty: 60 CAPSULE | Refills: 11 | Status: CANCELLED | OUTPATIENT
Start: 2024-02-12

## 2024-02-12 NOTE — TELEPHONE ENCOUNTER
[facility] Call Center    Phone Message    May a detailed message be left on voicemail: no     Reason for Call: Medication Refill Request    Has the patient contacted the pharmacy for the refill? Yes   Name of medication being requested: doxycycline hyclate (VIBRAMYCIN) 100 MG capsule   Provider who prescribed the medication: Dr. Cleary  Pharmacy: [facility] [address]   Date medication is needed: Now     Action Taken: Message routed to:  Adult Clinics: Dermatology p [id number]    Travel Screening: Not Applicable

## 2024-02-12 NOTE — TELEPHONE ENCOUNTER
[facility] Call Center    Phone Message    May a detailed message be left on voicemail: yes     Reason for Call: Medication Refill Request    Has the patient contacted the pharmacy for the refill? Yes   Name of medication being requested: Klonopin  Provider who prescribed the medication: Regine Last  Pharmacy: [address]   Date medication is needed: N/A      Action Taken: Message routed to:  Other: P PSYCHIATRY NURSE-P    Travel Screening: Not Applicable

## 2024-02-15 NOTE — TELEPHONE ENCOUNTER
Patient Quality Outreach    Patient is due for the following:   Diabetes -  Eye Exam  Hypertension -  BP check  Cervical Cancer Screening - PAP Needed  Physical Annual Wellness Visit      Topic Date Due    Polio Vaccine (2 of 3 - 4-dose series) 08/29/1995    Hepatitis A Vaccine (2 of 2 - Risk 2-dose series) 04/10/2015    Pneumococcal Vaccine (2 of 2 - PCV) 09/04/2021    COVID-19 Vaccine (5 - 2023-24 season) 09/01/2023   Patient cancelled appointment on 02/14/2024, new appointment is on 03/14/2024.  Appointment notes updated accordingly.    Type of outreach:    Chart review performed, no outreach needed.      Questions for provider review:    None           Radha Engle  Chart routed to Care Team.

## 2024-02-20 ENCOUNTER — MYC MEDICAL ADVICE (OUTPATIENT)
Dept: PSYCHIATRY | Facility: CLINIC | Age: 34
End: 2024-02-20
Payer: MEDICARE

## 2024-02-20 DIAGNOSIS — F41.9 ANXIETY: ICD-10-CM

## 2024-02-20 RX ORDER — CLONAZEPAM 0.5 MG/1
0.25 TABLET ORAL DAILY PRN
Qty: 2.5 TABLET | Refills: 0 | Status: SHIPPED | OUTPATIENT
Start: 2024-02-20 | End: 2024-02-22

## 2024-02-20 NOTE — TELEPHONE ENCOUNTER
Spoke to pharmacy and they did not receive the refill for 2/09/24. Will have covering provider sign off.

## 2024-02-21 ENCOUNTER — TELEPHONE (OUTPATIENT)
Dept: PSYCHIATRY | Facility: CLINIC | Age: 34
End: 2024-02-21
Payer: MEDICARE

## 2024-02-21 NOTE — TELEPHONE ENCOUNTER
[facility] Call Center    Phone Message    May a detailed message be left on voicemail: yes     Reason for Call: Other: Pt called to follow-up on conversation he had regarding his medication and the pharmacy.      Action Taken: Other: [facility] psych pool    Travel Screening: Not Applicable

## 2024-02-21 NOTE — TELEPHONE ENCOUNTER
"Writer called patient who states that pharmacy received the prescription, but they want it to be sent under patient's legal name \"Prakash Prasad,\" but it was sent under patient's birth name. Patient plans to send updated insurance card via [employer] to update his name in our system.    Writer called patient's pharmacy to clarify issues refilling prescription and they confirmed that they need the prescription to have patient's legal name, Prakash Prasad on it in order to dispense. They also need the prescription sent with a full quantity (I.e. 3 tablets instead of 2.5).      "

## 2024-02-22 DIAGNOSIS — F41.9 ANXIETY: ICD-10-CM

## 2024-02-22 RX ORDER — CLONAZEPAM 0.5 MG/1
0.25 TABLET ORAL DAILY PRN
Qty: 2.5 TABLET | Refills: 0 | Status: SHIPPED | OUTPATIENT
Start: 2024-02-22 | End: 2024-03-08

## 2024-02-22 NOTE — TELEPHONE ENCOUNTER
[facility] Call Center    Phone Message    May a detailed message be left on voicemail: yes     Reason for Call:   Prakash missed his appointment with Nadine Miller (covering for Regine) this morning. The soonest availability is his current appointment with Regine on 3/8/24. He asked for a call back from care team. He would like to discuss his restless leg symptoms. His  and he believes it is either the Proxilin Injection or his Seroquel that is causing the symptom and stated that it has gotten a lot worse.    Action Taken: Message routed to:  Other: psychiatry nurse p    Travel Screening: Not Applicable

## 2024-02-23 DIAGNOSIS — F90.9 ATTENTION DEFICIT HYPERACTIVITY DISORDER (ADHD), UNSPECIFIED ADHD TYPE: ICD-10-CM

## 2024-02-23 RX ORDER — DEXTROAMPHETAMINE SACCHARATE, AMPHETAMINE ASPARTATE MONOHYDRATE, DEXTROAMPHETAMINE SULFATE AND AMPHETAMINE SULFATE 3.75; 3.75; 3.75; 3.75 MG/1; MG/1; MG/1; MG/1
15 CAPSULE, EXTENDED RELEASE ORAL DAILY
Qty: 30 CAPSULE | Refills: 0 | Status: SHIPPED | OUTPATIENT
Start: 2024-02-23 | End: 2024-03-08

## 2024-02-23 NOTE — TELEPHONE ENCOUNTER
Date of Last Office Visit: 12/29/2023  [facility] & Addiction [facility]  Regine Last APRN CNP     Date of Next Office Visit:   3/8/2024 Status: Mariangel    Arrive By: 12:45 PM     Appointment Time: 1:00 PM Length: 60   Visit Type: ADULT PSYCHIATRY RETURN [[id number]] KRISTAN: [id number]   Provider: Regine Last APRN CNP Department: [facility] PSYCHIATRY     No shows since last visit: 0  Cancellations since last visit: 0  ------------------------------  amphetamine-dextroamphetamine (ADDERALL XR) 15 MG 24 hr capsule 30 capsule 0 1/29/2024     Sig - Route: Take 1 capsule (15 mg) by mouth daily - Oral  Sent to pharmacy as: Amphetamine-Dextroamphet ER 15 MG Oral Capsule Extended Release 24 Hour (ADDERALL XR)  Class: E-Prescribe  ------------------------------     Lapse in medication adherence greater than 5 days?:  no  Medication refill request verified as identical to current order?: yes       Last Visit Treatment Plan     Medication Ordered/Consults/Labs/tests Ordered:      Medication:   -Discontinue Strattera.  -Start Adderall XR 15 mg daily for ADHD. Monitor psychosis, anxiety, sleep difficulties.  -Continue all other medication regimen.  OTC Recommendations:   -Please have group home check blood pressure 2 times a week and report back to regine. If this is consistently over 140/90, you would need to follow up with Becki.  Lab Orders: none  Referrals: none  Release of Information: none  Future Treatment Considerations: Per symptoms. EKG after each Seroquel increase in the future.   Return for Follow Up: with PCP or ACT provider in 3-4 weeks       Refill decision: Refill pended and routed to the provider for review/determination due to the following criteria not met: CONTROLLED MEDICATION      Medication unable to be refilled by RN due to criteria not met as indicated.                 []Eligibility - not seen in the last year              []Supervision - no future appointment               []Compliance - no shows, cancellations or lapse in therapy              []Verification - order discrepancy              [x]Controlled medication              []Medication not included in policy              []90-day supply request              []Other:

## 2024-02-24 ENCOUNTER — MYC REFILL (OUTPATIENT)
Dept: FAMILY MEDICINE | Facility: CLINIC | Age: 34
End: 2024-02-24
Payer: MEDICARE

## 2024-02-24 DIAGNOSIS — E11.65 TYPE 2 DIABETES MELLITUS WITH HYPERGLYCEMIA, WITHOUT LONG-TERM CURRENT USE OF INSULIN (H): ICD-10-CM

## 2024-02-27 DIAGNOSIS — F39 MOOD DISORDER (H): Primary | ICD-10-CM

## 2024-02-27 RX ORDER — LITHIUM CARBONATE 450 MG
900 TABLET, EXTENDED RELEASE ORAL AT BEDTIME
Qty: 60 TABLET | Refills: 0 | Status: SHIPPED | OUTPATIENT
Start: 2024-02-27 | End: 2024-03-08

## 2024-02-27 RX ORDER — ATOMOXETINE 40 MG/1
CAPSULE ORAL
Qty: 60 CAPSULE | Refills: 10 | OUTPATIENT
Start: 2024-02-27

## 2024-02-27 NOTE — TELEPHONE ENCOUNTER
Date of Last Office Visit: 12/29/2023  [facility] Mental Health & Addiction [facility]  Regine Last APRN CNP      Date of Next Office Visit:   3/8/2024 Status: Mariangel     Arrive By: 12:45 PM       Appointment Time: 1:00 PM Length: 60   Visit Type: ADULT PSYCHIATRY RETURN [[id number]] KRISTAN: [id number]   Provider: Regine Last APRN CNP Department: [facility] PSYCHIATRY      No shows since last visit: X1 2/22  Cancellations since last visit: 0        ------------------------------  lithium (ESKALITH CR/LITHOBID) 450 MG CR tablet 60 tablet 2 12/29/2023 - No  Sig - Route: Take 2 tablets (900 mg) by mouth at bedtime - Oral  Sent to pharmacy as: [place] Carbonate  MG Oral Tablet Extended Release (ESKALITH CR/LITHOBID)   Class: E-Prescribe  ------------------------------     Lapse in medication adherence greater than 5 days?:  no  Medication refill request verified as identical to current order?: yes       Last Visit Treatment Plan   Medication:   -Discontinue Strattera.  -Start Adderall XR 15 mg daily for ADHD. Monitor psychosis, anxiety, sleep difficulties.  -Continue all other medication regimen.  OTC Recommendations:   -Please have group home check blood pressure 2 times a week and report back to regine. If this is consistently over 140/90, you would need to follow up with Becki.  Lab Orders: none  Referrals: none  Release of Information: none  Future Treatment Considerations: Per symptoms. EKG after each Seroquel increase in the future.   Return for Follow Up: with PCP or ACT provider in 3-4 weeks     Refill decision: Refill pended and routed to the provider for review/determination due to the following criteria not met:  No Show to last follow-up visit.        Medication unable to be refilled by RN due to criteria not met as indicated.                  []Eligibility - not seen in the last year              []Supervision - no future appointment              [x]Compliance - no shows,  cancellations or lapse in therapy              []Verification - order discrepancy              []Controlled medication              []Medication not included in policy              []90-day supply request              []Other:

## 2024-03-07 ENCOUNTER — TELEPHONE (OUTPATIENT)
Dept: PSYCHIATRY | Facility: CLINIC | Age: 34
End: 2024-03-07
Payer: MEDICARE

## 2024-03-07 ENCOUNTER — TELEPHONE (OUTPATIENT)
Dept: FAMILY MEDICINE | Facility: CLINIC | Age: 34
End: 2024-03-07
Payer: MEDICARE

## 2024-03-07 NOTE — TELEPHONE ENCOUNTER
Forms received from: [employer]    Phone number listed: [phone]   Fax listed: [phone]  Date received: 3/6/24  Form description: ICD 10 Codes  Once forms are completed, please return to [employer]  via fax.  Is patient requesting to be contacted when forms are completed: na  Phone: na  Form placed:  Becki Sorto

## 2024-03-07 NOTE — TELEPHONE ENCOUNTER
Regine,    I called and spoke to patient. Patient states he came down with a headaches and it last for 6 hours. It's a new symptom and would like to see if he could take something for the headaches . I did informed him of your message not to take Ibuprofen with Lithium . He states he does not have tylenol , but wondering if he could take the Sulindac 200 mg BID PRN that he got prescribed when he's in the the ER back in November . He does still have some left .  He is aware of your appointment tomorrow.    Alexia Garcia on 3/7/2024 at 2:51 PM

## 2024-03-07 NOTE — TELEPHONE ENCOUNTER
[facility] Call Center    Phone Message    May a detailed message be left on voicemail: yes     Reason for Call: Other: Pt wants to know if he can take advil while taking his one-time dose pf lithium.      Action Taken: Other: [facility] psych pool    Travel Screening: Not Applicable

## 2024-03-07 NOTE — TELEPHONE ENCOUNTER
Writer called and informed of Regine's message. Writer informed patient if he could try to get Tylenol today at the store and he states he can try to get it.  Reminded his appointment for tomorrow .    Alexia Garcia on 3/7/2024 at 3:34 PM

## 2024-03-08 ENCOUNTER — VIRTUAL VISIT (OUTPATIENT)
Dept: PSYCHIATRY | Facility: CLINIC | Age: 34
End: 2024-03-08
Attending: NURSE PRACTITIONER
Payer: MEDICARE

## 2024-03-08 ENCOUNTER — TELEPHONE (OUTPATIENT)
Dept: PSYCHIATRY | Facility: CLINIC | Age: 34
End: 2024-03-08
Payer: MEDICARE

## 2024-03-08 DIAGNOSIS — F43.10 PTSD (POST-TRAUMATIC STRESS DISORDER): ICD-10-CM

## 2024-03-08 DIAGNOSIS — F41.9 ANXIETY: ICD-10-CM

## 2024-03-08 DIAGNOSIS — G24.01 TARDIVE DYSKINESIA: Primary | ICD-10-CM

## 2024-03-08 DIAGNOSIS — F45.8 OTHER SOMATOFORM DISORDERS: ICD-10-CM

## 2024-03-08 DIAGNOSIS — F90.9 ATTENTION DEFICIT HYPERACTIVITY DISORDER (ADHD), UNSPECIFIED ADHD TYPE: ICD-10-CM

## 2024-03-08 DIAGNOSIS — F39 MOOD DISORDER (H): ICD-10-CM

## 2024-03-08 PROCEDURE — 99417 PROLNG OP E/M EACH 15 MIN: CPT | Mod: 95 | Performed by: NURSE PRACTITIONER

## 2024-03-08 PROCEDURE — 99215 OFFICE O/P EST HI 40 MIN: CPT | Mod: 95 | Performed by: NURSE PRACTITIONER

## 2024-03-08 PROCEDURE — G2211 COMPLEX E/M VISIT ADD ON: HCPCS | Mod: 95 | Performed by: NURSE PRACTITIONER

## 2024-03-08 RX ORDER — FLUPHENAZINE DECANOATE 25 MG/ML
25 INJECTION, SOLUTION INTRAMUSCULAR; SUBCUTANEOUS
Qty: 5 ML | Refills: 0 | Status: SHIPPED | OUTPATIENT
Start: 2024-03-08 | End: 2024-03-08

## 2024-03-08 RX ORDER — CLONAZEPAM 0.5 MG/1
0.25 TABLET ORAL DAILY PRN
Qty: 2.5 TABLET | Refills: 0 | Status: ON HOLD | OUTPATIENT
Start: 2024-03-21 | End: 2024-04-12

## 2024-03-08 RX ORDER — PROPRANOLOL HYDROCHLORIDE 10 MG/1
10 TABLET ORAL 3 TIMES DAILY
Qty: 90 TABLET | Refills: 2 | Status: ON HOLD | OUTPATIENT
Start: 2024-03-08 | End: 2024-04-12

## 2024-03-08 RX ORDER — DEXTROAMPHETAMINE SACCHARATE, AMPHETAMINE ASPARTATE MONOHYDRATE, DEXTROAMPHETAMINE SULFATE AND AMPHETAMINE SULFATE 3.75; 3.75; 3.75; 3.75 MG/1; MG/1; MG/1; MG/1
15 CAPSULE, EXTENDED RELEASE ORAL DAILY
Qty: 30 CAPSULE | Refills: 0 | Status: SHIPPED | OUTPATIENT
Start: 2024-03-22 | End: 2024-04-13

## 2024-03-08 RX ORDER — LITHIUM CARBONATE 450 MG
900 TABLET, EXTENDED RELEASE ORAL AT BEDTIME
Qty: 60 TABLET | Refills: 0 | Status: ON HOLD | OUTPATIENT
Start: 2024-03-08 | End: 2024-04-12

## 2024-03-08 RX ORDER — QUETIAPINE FUMARATE 25 MG/1
25-50 TABLET, FILM COATED ORAL 2 TIMES DAILY PRN
Qty: 60 TABLET | Refills: 1 | Status: ON HOLD | OUTPATIENT
Start: 2024-03-08 | End: 2024-04-12

## 2024-03-08 RX ORDER — QUETIAPINE FUMARATE 200 MG/1
200 TABLET, FILM COATED ORAL AT BEDTIME
Qty: 30 TABLET | Refills: 2 | Status: SHIPPED | OUTPATIENT
Start: 2024-03-08

## 2024-03-08 RX ORDER — FLUPHENAZINE DECANOATE 25 MG/ML
25 INJECTION, SOLUTION INTRAMUSCULAR; SUBCUTANEOUS
Qty: 5 ML | Refills: 0 | Status: SHIPPED | OUTPATIENT
Start: 2024-03-12 | End: 2024-04-23
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
## 2024-03-08 NOTE — PATIENT INSTRUCTIONS
-Start Ingrezza 40 mg daily for restlessness.  -May take Seroquel 25-50 mg up to 2 times a day as needed for anxiety. Monitor for sedation.  -Continue all other medication regimen for now.    -Recommend splitting Magnesium to daytime and bedtime dosing to help with restless leg at night.    -Complete labs including lithium lab at Becki's visit next week. Please make sure to adjust last lithium administration to 12 hours prior to lab.    Your next appointment is scheduled on 3/26/2024 (Tue) at 1pm.    Thank you for coming to the [facility] [place] [facility].     Lab Testing:  If you had lab testing today and your results are reassuring or normal they will be mailed to you or sent through B[employer] within 7 days. If the lab tests need quick action we will call you with the results. The phone number we will call with results is # [phone]. If this is not the best number please call our clinic and change the number.     Medication Refills:  If you need any refills please call your pharmacy and they will contact us. Our fax number for refills is [phone].   Three business days of notice are needed for general medication refill requests.   Five business days of notice are needed for controlled substance refill requests.   If you need to change to a different pharmacy, please contact the new pharmacy directly. The new pharmacy will help you get your medications transferred.     Contact Us:  Please call [phone] during business hours (8-5:00 M-F).   If you have medication related questions after clinic hours, or on the weekend, please call [phone].     Financial Assistance [phone]   Medical Records [phone]       MENTAL HEALTH CRISIS RESOURCES:  For a emergency help, please call 911 or go to the nearest Emergency Department.     Emergency Walk-In Options:   Em[facility] Unit @ [facility] ([place]): [phone] - Specialized mental health emergency area designed to  [facility] ([place]): [phone]  [facility] ([place]): [phone]  [facility] ([place]): [phone]    County Crisis Information:   [place]: [phone]  [place]: [phone]  [place] ([place]) - Adult: [phone]     Child: [phone]  [place] - Adult: [phone]     Child: [phone]  [place]: [phone]  List of all [facility] resources:   https://[place].gov/dhs/people-we-serve/adults/health-care/mental-health/resources/crisis-contacts.jsp    National Crisis Information:   Crisis Text Line: Text  [place]  to 512847  Suicide & Crisis Lifeline: 988  National Suicide Prevention Lifeline: 4-273-801-TALK (1-336.832.1700)       For online chat options, visit https://suicidepreventionlifeline.org/chat/  Poison Control Center: 1-655.294.6572  Trans Lifeline: 1-714.238.9153 - Hotline for transgender people of all ages  The [employer]: 1-215.534.1754 - Hotline for LGBT youth     For Non-Emergency Support:   Fast Tracker: Mental Health & Substance Use Disorder Resources -   https://www.[employer] [employer].org/

## 2024-03-08 NOTE — PROGRESS NOTES
"Virtual Visit Details    Type of service:  Video Visit   Video Start Time: {video visit start/end time for provider to select:841888}  Video End Time:{video visit start/end time for provider to select:309425}    Originating Location (pt. Location): {video visit patient location:938654::\"Home\"}  {PROVIDER LOCATION On-site should be selected for visits conducted from your clinic location or adjoining [facility] hospital, academic office, or other nearby [facility] building. Off-site should be selected for all other provider locations, including home:754219}  Distant Location (provider location):  {virtual location provider:449482}  Platform used for Video Visit: {Virtual Visit Platforms:658795::\"[email or web address]\"}  "

## 2024-03-08 NOTE — NURSING NOTE
Is the patient currently in the state of [place]? YES    Visit mode:VIDEO    If the visit is dropped, the patient can be reconnected by: VIDEO VISIT: Text to cell phone:   Telephone Information:   Mobile [phone]       Will anyone else be joining the visit? NO  (If patient encounters technical issues they should call [phone])    How would you like to obtain your AVS? MyChart    Are changes needed to the allergy or medication list? No    Reason for visit: No chief complaint on file.    [place] CIDF

## 2024-03-08 NOTE — TELEPHONE ENCOUNTER
Writer called Domenica, patients GH manager per provider request.  Domenica answered and said that she would find [facility] Prolixin administration records and either call the clinic or fax them to clinic.      Update: Domenica called back and said that patient last received medication on 02/27/2024, and they need a new prescription sent in.      .

## 2024-03-11 ENCOUNTER — TELEPHONE (OUTPATIENT)
Dept: PSYCHIATRY | Facility: CLINIC | Age: 34
End: 2024-03-11
Payer: MEDICARE

## 2024-03-11 ENCOUNTER — TELEPHONE (OUTPATIENT)
Dept: PLASTIC SURGERY | Facility: CLINIC | Age: 34
End: 2024-03-11
Payer: MEDICARE

## 2024-03-11 DIAGNOSIS — F41.9 ANXIETY: ICD-10-CM

## 2024-03-11 RX ORDER — CLONAZEPAM 0.5 MG/1
0.25 TABLET ORAL DAILY PRN
Qty: 2.5 TABLET | OUTPATIENT
Start: 2024-03-11

## 2024-03-11 NOTE — TELEPHONE ENCOUNTER
PA Initiation    Medication: INGREZZA 40 MG PO CAPS  Insurance Company: [employer] Part D - Phone [phone] Fax [phone]  Pharmacy Filling the Rx: [facility] [place], [place] - [address] [place] [address]  Filling Pharmacy Phone:    Filling Pharmacy Fax:    Start Date: 3/11/2024    Preferred products are Tetrabenazine, then Austedo.           Thank you,    Duyen Ruiz [facility]  Specialty Pharmacy Clinic Liaison - [facility] [address] [address]  Ph: [phone] Fax: [phone]  [email or web address]

## 2024-03-12 ENCOUNTER — TELEPHONE (OUTPATIENT)
Dept: PSYCHIATRY | Facility: CLINIC | Age: 34
End: 2024-03-12
Payer: MEDICARE

## 2024-03-12 DIAGNOSIS — G24.01 TARDIVE DYSKINESIA: Primary | ICD-10-CM

## 2024-03-12 RX ORDER — TETRABENAZINE 25 MG/1
25 TABLET ORAL 2 TIMES DAILY
Qty: 60 TABLET | Refills: 1 | OUTPATIENT
Start: 2024-03-12 | End: 2024-03-15

## 2024-03-12 NOTE — TELEPHONE ENCOUNTER
PA for Tetrabenazine submitted and created a new encounter for medication.    Thank you,    Duyen Ruiz [facility]  Specialty Pharmacy Clinic Liaison - CardiologyNeurologyMultiple [facility] [place] [address]  Ph: [phone] Fax: [phone]  [email or web address]

## 2024-03-12 NOTE — TELEPHONE ENCOUNTER
PA Initiation    Medication: TETRABENAZINE 25 MG PO TABS  Insurance Company: [employer] [place] Part D - Phone [phone] Fax [phone]  Pharmacy Filling the Rx: [facility] - [place], [place] - [address]  Filling Pharmacy Phone:    Filling Pharmacy Fax:    Start Date: 3/12/2024          Thank you,    Duyen Ruiz [facility]  Specialty Pharmacy Clinic Liaison - CardiologyNeurologyMultiple [facility] [address]  [address] [place] [address]  Ph: [phone] Fax: [phone]  [email or web address]

## 2024-03-13 ENCOUNTER — TELEPHONE (OUTPATIENT)
Dept: PLASTIC SURGERY | Facility: CLINIC | Age: 34
End: 2024-03-13

## 2024-03-13 ENCOUNTER — OFFICE VISIT (OUTPATIENT)
Dept: FAMILY MEDICINE | Facility: CLINIC | Age: 34
End: 2024-03-13
Payer: MEDICARE

## 2024-03-13 VITALS
BODY MASS INDEX: 35.79 KG/M2 | HEIGHT: 67 IN | SYSTOLIC BLOOD PRESSURE: 134 MMHG | DIASTOLIC BLOOD PRESSURE: 88 MMHG | RESPIRATION RATE: 16 BRPM | TEMPERATURE: 97.4 F | HEART RATE: 73 BPM | OXYGEN SATURATION: 99 % | WEIGHT: 228 LBS

## 2024-03-13 DIAGNOSIS — I10 HYPERTENSION, UNSPECIFIED TYPE: ICD-10-CM

## 2024-03-13 DIAGNOSIS — F33.1 MODERATE EPISODE OF RECURRENT MAJOR DEPRESSIVE DISORDER (H): ICD-10-CM

## 2024-03-13 DIAGNOSIS — F25.0 SCHIZOAFFECTIVE DISORDER, BIPOLAR TYPE (H): ICD-10-CM

## 2024-03-13 DIAGNOSIS — F25.9 SCHIZOPHRENIA, SCHIZOAFFECTIVE, CHRONIC WITH ACUTE EXACERBATION (H): ICD-10-CM

## 2024-03-13 DIAGNOSIS — E11.65 TYPE 2 DIABETES MELLITUS WITH HYPERGLYCEMIA, WITHOUT LONG-TERM CURRENT USE OF INSULIN (H): ICD-10-CM

## 2024-03-13 DIAGNOSIS — Z78.9 FEMALE-TO-MALE TRANSGENDER PERSON: ICD-10-CM

## 2024-03-13 DIAGNOSIS — F60.3 BORDERLINE PERSONALITY DISORDER (H): Chronic | ICD-10-CM

## 2024-03-13 DIAGNOSIS — F25.9 SCHIZOAFFECTIVE DISORDER, CHRONIC CONDITION WITH ACUTE EXACERBATION (H): ICD-10-CM

## 2024-03-13 DIAGNOSIS — Z23 NEED FOR TDAP VACCINATION: ICD-10-CM

## 2024-03-13 DIAGNOSIS — Z00.00 ENCOUNTER FOR MEDICARE ANNUAL WELLNESS EXAM: Primary | ICD-10-CM

## 2024-03-13 DIAGNOSIS — Z23 NEED FOR PROPHYLACTIC VACCINATION AGAINST HEPATITIS A: ICD-10-CM

## 2024-03-13 DIAGNOSIS — F31.64 BIPOLAR AFFECTIVE DISORDER, MIXED, SEVERE, WITH PSYCHOTIC BEHAVIOR (H): ICD-10-CM

## 2024-03-13 PROBLEM — F30.9 MANIA (H): Status: RESOLVED | Noted: 2019-06-04 | Resolved: 2024-03-13

## 2024-03-13 PROBLEM — R45.851 SUICIDAL IDEATION: Status: RESOLVED | Noted: 2023-05-27 | Resolved: 2024-03-13

## 2024-03-13 PROBLEM — F64.9 GENDER DYSPHORIA: Chronic | Status: ACTIVE | Noted: 2021-02-16

## 2024-03-13 LAB
ALBUMIN SERPL BCG-MCNC: 4.5 G/DL (ref 3.5–5.2)
ALP SERPL-CCNC: 86 U/L (ref 40–150)
ALT SERPL W P-5'-P-CCNC: 57 U/L (ref 0–70)
ANION GAP SERPL CALCULATED.3IONS-SCNC: 10 MMOL/L (ref 7–15)
AST SERPL W P-5'-P-CCNC: 49 U/L (ref 0–45)
BASOPHILS # BLD AUTO: 0 10E3/UL (ref 0–0.2)
BASOPHILS NFR BLD AUTO: 0 %
BILIRUB SERPL-MCNC: 0.4 MG/DL
BUN SERPL-MCNC: 10.2 MG/DL (ref 6–20)
CALCIUM SERPL-MCNC: 9.4 MG/DL (ref 8.6–10)
CHLORIDE SERPL-SCNC: 107 MMOL/L (ref 98–107)
CHOLEST SERPL-MCNC: 118 MG/DL
CREAT SERPL-MCNC: 0.74 MG/DL (ref 0.51–1.17)
CREAT UR-MCNC: 170 MG/DL
DEPRECATED HCO3 PLAS-SCNC: 20 MMOL/L (ref 22–29)
EGFRCR SERPLBLD CKD-EPI 2021: >90 ML/MIN/1.73M2
EOSINOPHIL # BLD AUTO: 0.2 10E3/UL (ref 0–0.7)
EOSINOPHIL NFR BLD AUTO: 2 %
ERYTHROCYTE [DISTWIDTH] IN BLOOD BY AUTOMATED COUNT: 14.3 % (ref 10–15)
FASTING STATUS PATIENT QL REPORTED: YES
FERRITIN SERPL-MCNC: 83 NG/ML (ref 6–409)
GLUCOSE SERPL-MCNC: 179 MG/DL (ref 70–99)
HBA1C MFR BLD: 7.8 % (ref 0–5.6)
HCT VFR BLD AUTO: 45.5 % (ref 35–53)
HDLC SERPL-MCNC: 39 MG/DL
HGB BLD-MCNC: 15.3 G/DL (ref 11.7–17.7)
IMM GRANULOCYTES # BLD: 0 10E3/UL
IMM GRANULOCYTES NFR BLD: 0 %
IRON BINDING CAPACITY (ROCHE): 353 UG/DL (ref 240–430)
IRON SATN MFR SERPL: 21 % (ref 15–46)
IRON SERPL-MCNC: 75 UG/DL (ref 37–157)
LDLC SERPL CALC-MCNC: 38 MG/DL
LITHIUM SERPL-SCNC: 0.29 MMOL/L (ref 0.6–1.2)
LYMPHOCYTES # BLD AUTO: 2.3 10E3/UL (ref 0.8–5.3)
LYMPHOCYTES NFR BLD AUTO: 23 %
MCH RBC QN AUTO: 28.5 PG (ref 26.5–33)
MCHC RBC AUTO-ENTMCNC: 33.6 G/DL (ref 31.5–36.5)
MCV RBC AUTO: 85 FL (ref 78–100)
MICROALBUMIN UR-MCNC: 244 MG/L
MICROALBUMIN/CREAT UR: 143.53 MG/G CR (ref 0–25)
MONOCYTES # BLD AUTO: 0.5 10E3/UL (ref 0–1.3)
MONOCYTES NFR BLD AUTO: 5 %
NEUTROPHILS # BLD AUTO: 6.9 10E3/UL (ref 1.6–8.3)
NEUTROPHILS NFR BLD AUTO: 70 %
NONHDLC SERPL-MCNC: 79 MG/DL
PLATELET # BLD AUTO: 253 10E3/UL (ref 150–450)
POTASSIUM SERPL-SCNC: 4.5 MMOL/L (ref 3.4–5.3)
PROT SERPL-MCNC: 7.8 G/DL (ref 6.4–8.3)
RBC # BLD AUTO: 5.36 10E6/UL (ref 3.8–5.9)
SODIUM SERPL-SCNC: 137 MMOL/L (ref 135–145)
TRIGL SERPL-MCNC: 203 MG/DL
TSH SERPL DL<=0.005 MIU/L-ACNC: 2.05 UIU/ML (ref 0.3–4.2)
WBC # BLD AUTO: 10 10E3/UL (ref 4–11)

## 2024-03-13 PROCEDURE — 83550 IRON BINDING TEST: CPT | Performed by: NURSE PRACTITIONER

## 2024-03-13 PROCEDURE — 82570 ASSAY OF URINE CREATININE: CPT | Performed by: NURSE PRACTITIONER

## 2024-03-13 PROCEDURE — 36415 COLL VENOUS BLD VENIPUNCTURE: CPT | Performed by: NURSE PRACTITIONER

## 2024-03-13 PROCEDURE — 80061 LIPID PANEL: CPT | Performed by: NURSE PRACTITIONER

## 2024-03-13 PROCEDURE — 83540 ASSAY OF IRON: CPT | Performed by: NURSE PRACTITIONER

## 2024-03-13 PROCEDURE — 80053 COMPREHEN METABOLIC PANEL: CPT | Performed by: NURSE PRACTITIONER

## 2024-03-13 PROCEDURE — 99214 OFFICE O/P EST MOD 30 MIN: CPT | Mod: 25 | Performed by: NURSE PRACTITIONER

## 2024-03-13 PROCEDURE — 85025 COMPLETE CBC W/AUTO DIFF WBC: CPT | Performed by: NURSE PRACTITIONER

## 2024-03-13 PROCEDURE — 83036 HEMOGLOBIN GLYCOSYLATED A1C: CPT | Performed by: NURSE PRACTITIONER

## 2024-03-13 PROCEDURE — 84443 ASSAY THYROID STIM HORMONE: CPT | Performed by: NURSE PRACTITIONER

## 2024-03-13 PROCEDURE — G0438 PPPS, INITIAL VISIT: HCPCS | Performed by: NURSE PRACTITIONER

## 2024-03-13 PROCEDURE — 82728 ASSAY OF FERRITIN: CPT | Performed by: NURSE PRACTITIONER

## 2024-03-13 PROCEDURE — 80178 ASSAY OF LITHIUM: CPT | Performed by: NURSE PRACTITIONER

## 2024-03-13 PROCEDURE — 82043 UR ALBUMIN QUANTITATIVE: CPT | Performed by: NURSE PRACTITIONER

## 2024-03-13 SDOH — HEALTH STABILITY: PHYSICAL HEALTH: ON AVERAGE, HOW MANY DAYS PER WEEK DO YOU ENGAGE IN MODERATE TO STRENUOUS EXERCISE (LIKE A BRISK WALK)?: 0 DAYS

## 2024-03-13 ASSESSMENT — SOCIAL DETERMINANTS OF HEALTH (SDOH): HOW OFTEN DO YOU GET TOGETHER WITH FRIENDS OR RELATIVES?: THREE TIMES A WEEK

## 2024-03-13 NOTE — PATIENT INSTRUCTIONS
Please make an appointment  to see eye Dr.     Please make an appointment  to see the dentist.     Please go to pharmacy for your tetanus vaccine and hepatitis A vaccine.  They are not covered in clinic.      Preventive Care Advice   This is general advice given by our system to help you stay healthy. However, your care team may have specific advice just for you. Please talk to your care team about your preventive care needs.  Nutrition  Eat 5 or more servings of fruits and vegetables each day.  Try wheat bread, brown rice and whole grain pasta (instead of white bread, rice, and pasta).  Get enough calcium and vitamin D. Check the label on foods and aim for 100% of the RDA (recommended daily allowance).  Lifestyle  Exercise at least 150 minutes each week   (30 minutes a day, 5 days a week).  Do muscle strengthening activities 2 days a week. These help control your weight and prevent disease.  No smoking.  Wear sunscreen to prevent skin cancer.  Have a dental exam and cleaning every 6 months.  Yearly exams  See your health care team every year to talk about:  Any changes in your health.  Any medicines your care team has prescribed.  Preventive care, family planning, and ways to prevent chronic diseases.  Shots (vaccines)   HPV shots (up to age 26), if you've never had them before.  Hepatitis B shots (up to age 59), if you've never had them before.  COVID-19 shot: Get this shot when it's due.  Flu shot: Get a flu shot every year.  Tetanus shot: Get a tetanus shot every 10 years.  Pneumococcal, hepatitis A, and RSV shots: Ask your care team if you need these based on your risk.  Shingles shot (for age 50 and up).  General health tests  Diabetes screening:  Starting at age 35, Get screened for diabetes at least every 3 years.  If you are younger than age 35, ask your care team if you should be screened for diabetes.  Cholesterol test: At age 39, start having a cholesterol test every 5 years, or more often if  advised.  Bone density scan (DEXA): At age 50, ask your care team if you should have this scan for osteoporosis (brittle bones).  Hepatitis C: Get tested at least once in your life.  STIs (sexually transmitted infections)  Before age 24: Ask your care team if you should be screened for STIs.  After age 24: Get screened for STIs if you're at risk. You are at risk for STIs (including HIV) if:  You are sexually active with more than one person.  You don't use condoms every time.  You or a partner was diagnosed with a sexually transmitted infection.  If you are at risk for HIV, ask about PrEP medicine to prevent HIV.  Get tested for HIV at least once in your life, whether you are at risk for HIV or not.  Cancer screening tests  Cervical cancer screening: If you have a cervix, begin getting regular cervical cancer screening tests at age 21. Most people who have regular screenings with normal results can stop after age 65. Talk about this with your provider.  Breast cancer scan (mammogram): If you've ever had breasts, begin having regular mammograms starting at age 40. This is a scan to check for breast cancer.  Colon cancer screening: It is important to start screening for colon cancer at age 45.  Have a colonoscopy test every 10 years (or more often if you're at risk) Or, ask your provider about stool tests like a FIT test every year or Cologuard test every 3 years.  To learn more about your testing options, visit: https://www.[employer]/259773.pdf.  For help making a decision, visit: https://bit.ly/pz00062.  Prostate cancer screening test: If you have a prostate and are age 55 to 69, ask your provider if you would benefit from a yearly prostate cancer screening test.  Lung cancer screening: If you are a current or former smoker age 50 to 80, ask your care team if ongoing lung cancer screenings are right for you.  For informational purposes only. Not to replace the advice of your health care provider. Copyright   2023  [facility]. All rights reserved. Clinically reviewed by the [facility]. [employer] 171074 - REV 01/24.    Learning About Sleeping Well  What does sleeping well mean?     Sleeping well means getting enough sleep to feel good and stay healthy. How much sleep is enough varies among people.  The number of hours you sleep and how you feel when you wake up are both important. If you do not feel refreshed, you probably need more sleep. Another sign of not getting enough sleep is feeling tired during the day.  Experts recommend that adults get at least 7 or more hours of sleep per day. Children and older adults need more sleep.  Why is getting enough sleep important?  Getting enough quality sleep is a basic part of good health. When your sleep suffers, your physical health, mood, and your thoughts can suffer too. You may find yourself feeling more grumpy or stressed. Not getting enough sleep also can lead to serious problems, including injury, accidents, anxiety, and depression.  What might cause poor sleeping?  Many things can cause sleep problems, including:  Changes to your sleep schedule.  Stress. Stress can be caused by fear about a single event, such as giving a speech. Or you may have ongoing stress, such as worry about work or school.  Depression, anxiety, and other mental or emotional conditions.  Changes in your sleep habits or surroundings. This includes changes that happen where you sleep, such as noise, light, or sleeping in a different bed. It also includes changes in your sleep pattern, such as having jet lag or working a late shift.  Health problems, such as pain, breathing problems, and restless legs syndrome.  Lack of regular exercise.  Using alcohol, nicotine, or caffeine before bed.  How can you help yourself?  Here are some tips that may help you sleep more soundly and wake up feeling more refreshed.  Your sleeping area   Use your bedroom only for sleeping and  "sex. A bit of light reading may help you fall asleep. But if it doesn't, do your reading elsewhere in the house. Try not to use your TV, computer, smartphone, or tablet while you are in bed.  Be sure your bed is big enough to stretch out comfortably, especially if you have a sleep partner.  Keep your bedroom quiet, dark, and cool. Use curtains, blinds, or a sleep mask to block out light. To block out noise, use earplugs, soothing music, or a \"white noise\" machine.  Your evening and bedtime routine   Create a relaxing bedtime routine. You might want to take a warm shower or bath, or listen to soothing music.  Go to bed at the same time every night. And get up at the same time every morning, even if you feel tired.  What to avoid   Limit caffeine (coffee, tea, caffeinated sodas) during the day, and don't have any for at least 6 hours before bedtime.  Avoid drinking alcohol before bedtime. Alcohol can cause you to wake up more often during the night.  Try not to smoke or use tobacco, especially in the evening. Nicotine can keep you awake.  Limit naps during the day, especially close to bedtime.  Avoid lying in bed awake for too long. If you can't fall asleep or if you wake up in the middle of the night and can't get back to sleep within about 20 minutes, get out of bed and go to another room until you feel sleepy.  Avoid taking medicine right before bed that may keep you awake or make you feel hyper or energized. Your doctor can tell you if your medicine may do this and if you can take it earlier in the day.  If you can't sleep   Imagine yourself in a peaceful, pleasant scene. Focus on the details and feelings of being in a place that is relaxing.  Get up and do a quiet or boring activity until you feel sleepy.  Avoid drinking any liquids before going to bed to help prevent waking up often to use the bathroom.  Where can you learn more?  Go to https://www.healthwise.net/patiented  Enter J942 in the search box to learn " "more about \"Learning About Sleeping Well.\"  Current as of: July 11, 2023               Content Version: 13.8    4572-8223 [employer].   Care instructions adapted under license by your healthcare professional. If you have questions about a medical condition or this instruction, always ask your healthcare professional. [employer] disclaims any warranty or liability for your use of this information.      "

## 2024-03-13 NOTE — PROGRESS NOTES
Preventive Care Visit  [facility] BROOKLYN Mullen CNP, Family Medicine  Mar 13, 2024      Assessment & Plan     Schizoaffective disorder, chronic condition with acute exacerbation (H)  Most recent psychiatry notes reviewed.  Continue to follow closely with psychiatry  Will check with today  - Lithium level    Type 2 diabetes mellitus with hyperglycemia, without long-term current use of insulin (H)  A1c today unchanged-remains 7.8.  Desires surgery in the future.  Informed ideally, would like A1c around 7 for elective surgery.  Declines additional injections-GLP-1.  Is agreeable to additional oral medication.  Prescription sent for Rybelsus.  Educated on use and possible side effects.  Plan follow-up appointment in 6 months-prefers virtual with labs prior.  Encouraged eye exam-declines.  Reinforced importance of foot protection at all times.  Encouraged to check blood sugars more routinely-declines.  Offered to reorder continuous glucose monitor-declines  Encouraged to quit using nicotine.  - TSH with free T4 reflex  - Lipid panel reflex to direct LDL Fasting  - Hemoglobin A1c  - Comprehensive metabolic panel  - CBC with Platelets & Differential  - Albumin Random Urine Quantitative with Creat Ratio  - Semaglutide (RYBELSUS) 3 MG tablet; Take 3 mg by mouth daily  - Semaglutide (RYBELSUS) 7 MG tablet; Take 1 tablet (7 mg) by mouth daily    Need for Tdap vaccination  Not covered in clinic, encouraged to go to pharmacy for vaccine    Need for prophylactic vaccination against hepatitis A  Not covered in clinic, encouraged to go to pharmacy for vaccine    Encounter for Medicare annual wellness exam  Screening guidelines reviewed.  Declines Pap smear-states never wants to have done again  Offered referral to gynecology for consideration of hysterectomy-declines    Hypertension, unspecified type  Stable-blood pressure well-controlled today in clinic.    Schizoaffective disorder, bipolar type  "(H)  Most recent psychiatry notes reviewed.  Continue to follow closely with psychiatry    Moderate episode of recurrent major depressive disorder (H)  Most recent psychiatry notes reviewed.  Continue to follow closely with psychiatry    Bipolar affective disorder, mixed, severe, with psychotic behavior (H)  Most recent psychiatry notes reviewed.  Continue to follow closely with psychiatry    Schizophrenia, schizoaffective, chronic with acute exacerbation (H)  Most recent psychiatry notes reviewed.  Continue to follow closely with psychiatry    Borderline personality disorder (H)  Most recent psychiatry notes reviewed.  Continue to follow closely with psychiatry    Female-to-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 person  Continue testosterone.  Desires top surgery in the future.        BMI  Estimated body mass index is 35.71 kg/m  as calculated from the following:    Height as of this encounter: 1.702 m (5' 7\").    Weight as of this encounter: 103.4 kg (228 lb).   Weight management plan: Discussed healthy diet and exercise guidelines    Counseling  Appropriate preventive services were discussed with this patient, including applicable screening as appropriate for fall prevention, nutrition, physical activity, Tobacco-use cessation, weight loss and cognition.  Checklist reviewing preventive services available has been given to the patient.  Reviewed patient's diet, addressing concerns and/or questions.   The patient was instructed to see the dentist every 6 months.   Discussed possible causes of fatigue.       Davin Randall is a 33 year old, presenting for the following:  Physical        3/13/2024     9:56 AM   Additional Questions   Roomed by Nadine   Accompanied by Self         3/13/2024     9:56 AM   Patient Reported Additional Medications   Patient reports taking the following new medications na        Health Care Directive  Patient does not have a Health Care Directive or Living Will: Discussed advance care planning with patient; " however, patient declined at this time.    HPI      Patient with history of Diabetes, Depression, ADHD, Hypertension and Hyperlipidemia arrived for Annual Physical. PHQ9 completed online, GAD7 and ADHD questionnaires given in room.     **Labs collected upon arrival.     Diabetes Follow-up    How often are you checking your blood sugar? Not at all  What concerns do you have today about your diabetes? None   Do you have any of these symptoms? (Select all that apply)  No numbness or tingling in feet.  No redness, sores or blisters on feet.  No complaints of excessive thirst.  No reports of blurry vision.  No significant changes to weight.  Have you had a diabetic eye exam in the last 12 months? No            Medication Followup of Adderall  Taking Medication as prescribed: yes  Side Effects:  None  Medication Helping Symptoms:  yes    Hyperlipidemia Follow-Up    Are you regularly taking any medication or supplement to lower your cholesterol?   Yes- Crestor   Are you having muscle aches or other side effects that you think could be caused by your cholesterol lowering medication?  No    Hypertension Follow-up    Do you check your blood pressure regularly outside of the clinic? No   Are you following a low salt diet? No  Are your blood pressures ever more than 140 on the top number (systolic) OR more   than 90 on the bottom number (diastolic), for example 140/90? No    BP Readings from Last 2 Encounters:   03/13/24 134/88   11/29/23 124/82     Hemoglobin A1C (%)   Date Value   03/13/2024 7.8 (H)   09/26/2023 7.8 (H)   03/19/2021 5.8 (H)   11/05/2018 5.8 (H)     LDL Cholesterol Calculated   Date Value   11/08/2023 41 mg/dL   09/26/2023      Comment:     Cannot estimate LDL when triglyceride exceeds 400 mg/dL   04/28/2021 81 mg/dL   03/01/2021 51 mg/dL     LDL Cholesterol Direct (mg/dL)   Date Value   09/26/2023 137 (H)         Depression and Anxiety   How are you doing with your depression since your last visit? No  "change  How are you doing with your anxiety since your last visit?  No change  Are you having other symptoms that might be associated with depression or anxiety? No  Have you had a significant life event? No   Do you have any concerns with your use of alcohol or other drugs? No    Social History     Tobacco Use    Smoking status: Former     Packs/day: 0.50     Years: 5.00     Additional pack years: 0.00     Total pack years: 2.50     Types: Other, Cigarettes     Start date: 2011     Quit date: 2022     Years since quittin.4     Passive exposure: Never    Smokeless tobacco: Former     Types: Chew     Quit date: 2019    Tobacco comments:     Just nicotine gum currently. 23   Vaping Use    Vaping Use: Former    Substances: Nicotine, Flavoring    Devices: Shift Mediable tank   Substance Use Topics    Alcohol use: No    Drug use: Not Currently     Types: Marijuana, Opiates     Comment: denies using oxy or other opiates_\"not for years\"         11/10/2023     1:00 PM 2023    12:42 PM 3/13/2024     9:29 AM   PHQ   PHQ-9 Total Score 6 0 0   Q9: Thoughts of better off dead/self-harm past 2 weeks Not at all Not at all Not at all         2023    12:46 PM 2023    12:01 PM 11/10/2023     1:00 PM   AVA-7 SCORE   Total Score 7 (mild anxiety)     Total Score 7    7 0 21       Suicide Assessment Five-step Evaluation and Treatment (SAFE-T)    Please answer the questions below, rating yourself on each of the criteria shown using the scale on the right side of the page. As you answer each question, place an X in the box that best describes how you have felt and conducted yourself over the past 6 months.     Never Rarely Sometimes Often Very Often   1 How often do you have trouble wrapping up the final details of a project once the challenging parts have been done?        2 How often do you have difficulty getting things in order when you have to do a task that requires organization?        3 How often " do you have problems remembering appointments or obligations?        4 When you have a task that requires a lot of thought, how often do you avoid or delay getting started?        5 How often do you fidget or squirm with your hands or feet when you have to sit down for a long time?        6 How often do you feel overly active and compelled to do things, like you were driven by a motor?        7 How often do you make careless mistakes when you have to work on a boring or difficult project?        8 How often do you have difficulty keeping your attention when you are doing boring or repetitive work?        9 How often do you have difficulty concentrating on what people say to you, even when they are speaking to you directly?        10 How often do you misplace or have difficulty finding things at home or at work?        11 How often are you distracted by activity or noise around you?        12 How often do you leave your seat in meetings or other situations in which you are expected to remain seated?        13 How often do you feel restless or fidgety?        14 How often do you have difficulty unwinding and relaxing when you have time to yourself?        15 How often do you find yourself talking too much when you are in social situations?        16 When you're in a conversation, how often do you find yourself finishing the sentences of the people you are talking to, before they can finish them themselves?        17 How often do you have difficulty waiting your turn in situations when turn-taking is required?        18 How often do you interrupt others when they are busy?                 3/13/2024   General Health   How would you rate your overall physical health? Good   Feel stress (tense, anxious, or unable to sleep) Not at all         3/13/2024   Nutrition   Diet: Regular (no restrictions)         3/13/2024   Exercise   Days per week of moderate/strenous exercise 0 days   (!) EXERCISE CONCERN      3/13/2024    Social Factors   Frequency of gathering with friends or relatives Three times a week   Worry food won't last until get money to buy more No   Food not last or not have enough money for food? No   Do you have housing?  Yes   Are you worried about losing your housing? No   Lack of transportation? No   Unable to get utilities (heat,electricity)? No         2020   Fall Risk   Fallen 2 or more times in the past year? No          3/13/2024   Activities of Daily Living- Home Safety   Needs help with the following daily activites None of the above   Safety concerns in the home None of the above         3/13/2024   Dental   Dentist two times every year? (!) NO         3/13/2024   Hearing Screening   Hearing concerns? None of the above         3/13/2024   Driving Risk Screening   Patient/family members have concerns about driving No         3/13/2024   General Alertness/Fatigue Screening   Have you been more tired than usual lately? (!) YES         3/13/2024   Urinary Incontinence Screening   Bothered by leaking urine in past 6 months No          Today's PHQ-9 Score:       3/13/2024     9:29 AM   PHQ-9 SCORE   PHQ-9 Total Score MyChart 0   PHQ-9 Total Score 0         3/13/2024   Substance Use   Alcohol more than 3/day or more than 7/wk No   Do you have a current opioid prescription? No   How severe/bad is pain from 1 to 10? 0/10 (No Pain)   Do you use any other substances recreationally? No     Social History     Tobacco Use    Smoking status: Former     Packs/day: 0.50     Years: 5.00     Additional pack years: 0.00     Total pack years: 2.50     Types: Other, Cigarettes     Start date: 2011     Quit date: 2022     Years since quittin.4     Passive exposure: Never    Smokeless tobacco: Former     Types: Chew     Quit date: 2019    Tobacco comments:     Just nicotine gum currently. 23   Vaping Use    Vaping Use: Former    Substances: Nicotine, Flavoring    Devices: Refillable tank   Substance  "Use Topics    Alcohol use: No    Drug use: Not Currently     Types: Marijuana, Opiates     Comment: denies using oxy or other opiates_\"not for years\"             3/13/2024   Breast Cancer Screening   Family history of breast, colon, or ovarian cancer? No / Unknown         10/20/2022   LAST FHS-7 RESULTS   1st degree relative breast or ovarian cancer No   Any relative bilateral breast cancer No   Any male have breast cancer No   Any ONE woman have BOTH breast AND ovarian cancer No   Any woman with breast cancer before 50yrs No   2 or more relatives with breast AND/OR ovarian cancer No   2 or more relatives with breast AND/OR bowel cancer No            History of abnormal Pap smear: NO - age 30-65 PAP every 5 years with negative HPV co-testing recommended        Latest Ref Rng & Units 12/7/2018    12:06 PM 12/7/2018    11:55 AM   PAP / HPV   PAP (Historical)  NIL     HPV 16 DNA NEG^Negative  Negative    HPV 18 DNA NEG^Negative  Negative    Other HR HPV NEG^Negative  Negative            3/13/2024   Contraception/Family Planning   Questions about contraception or family planning No         Reviewed and updated as needed this visit by Provider     Meds  Problems             Here today for physical and diabetic check.  Is fasting today for labs.  Continues to live at a group home in [place].  Has lived there for the last 5 years.  This is going very well.  Unfortunately, father had MI last week with 3 stents placed.    Feels like mental health is stable.  Does not like receiving Prolixin injection.  Feels like it is a chemical restraint.  Not currently having any thoughts of harming self or others.  Continues to follow very closely with psychiatry.    Is not checking blood sugars.  Does not want to do any more needles.  Declines wanting to use continuous glucose monitor.  Does not want any additional medications that are injections but is agreeable to oral medications.  Has been taking insulin consistently.  Has lost " weight since last office visit.  Does not always wear foot protection.  No numbness or tingling to feet.  Has not had eye exam in some time.  Denies any vision changes.    Last Pap: 2018-normal.  Declines repeat.  Declines referral to gynecology for consideration of hysterectomy  Last mammogram: Never, no family history   Last BMD: N/A  Last Colonoscopy: Never, no family history  Last eye exam: 1-2 years ago  Last dental exam: 3-4 years ago  Last tetanus vaccine:2014  Last influenza vaccine: 11/15/23  Last shingles vaccine: N/A  Last pneumonia vaccine: N/A  Last RSV vaccine: N/A  Last COVID booster: declines   Hep C screen: Done 2022  HIV screen: Done 2019  AAA screen (age 65-78 with smoking hx): N/A  IVD (HTN, Hyperlipid, Smoking): Current smoker/vape  Lung CA screening (50-77, 20 pk smoking hx) Quit within 15 years: N/A    Current providers sharing in care for this patient include:  Patient Care Team:  Becki Avery APRN CNP as PCP - General (Family Medicine)  Kathie Sandhu MD as MD (Neurology)  Tessa Galvan, RN as Specialty Care Coordinator  Ashu Russell DO as MD (Psychiatry & Neurology - Neurology)  Becki Avery APRN CNP as Assigned PCP  Desmond West as Specialty Care Coordinator (Plastic Surgery)  Harvey Negron MD as MD (Psychiatry)  Dora Mazariegos, PhD (Student in organized health care education/training program)  Glenda Juan MD as Resident (Family Medicine)  Ayana  as   Regine Last APRN CNP as Nurse Practitioner (Psychiatry)  Oswaldo Amado MD as MD (Dermatology)  Regine Last APRN CNP as Assigned Behavioral Health Provider  Mark Cleary MD as MD (Dermatology)  Lucie Chan, RN as Specialty Care Coordinator (Psychiatry)  Ashu Russell DO as MD (Neurology)  Luz Moncada APRN CNP as Assigned Neuroscience Provider  Alice Tubbs [facility] as Pharmacist  "(Pharmacist)  Mark Cleary MD as Assigned Surgical Provider  Moriah Rojas RPH as Pharmacist (Pharmacist)  Moriah Rojas RPH as Assigned [facility] Pharmacist  Leventhal, Thomas Michael, MD as MD (Gastroenterology)  Mark Cleary MD as MD (Dermatology)    The following health maintenance items are reviewed in Epic and correct as of today:  Health Maintenance   Topic Date Due    IPV IMMUNIZATION (2 of 3 - 4-dose series) 08/29/1995    HEPATITIS A IMMUNIZATION (2 of 2 - Risk 2-dose series) 04/10/2015    Pneumococcal Vaccine: Pediatrics (0 to 5 Years) and At-Risk Patients (6 to 64 Years) (2 of 2 - PCV) 09/04/2021    PAP  12/07/2021    MENTAL HEALTH TX PLAN  02/09/2022    COVID-19 Vaccine (5 - 2023-24 season) 09/01/2023    EYE EXAM  11/01/2023    DTAP/TDAP/TD IMMUNIZATION (4 - Td or Tdap) 03/27/2024    DIABETIC FOOT EXAM  05/22/2024    A1C  06/13/2024    PHQ-9  09/13/2024    MICROALBUMIN  09/26/2024    ANNUAL REVIEW OF HM ORDERS  09/28/2024    AVA ASSESSMENT  11/02/2024    LIPID  11/08/2024    BMP  11/17/2024    ADVANCE CARE PLANNING  01/17/2025    MEDICARE ANNUAL WELLNESS VISIT  03/13/2025    HEPATITIS C SCREENING  Completed    HIV SCREENING  Completed    INFLUENZA VACCINE  Completed    HEPATITIS B IMMUNIZATION  Completed    HPV IMMUNIZATION  Aged Out    MENINGITIS IMMUNIZATION  Aged Out    RSV MONOCLONAL ANTIBODY  Aged Out    MAMMO SCREENING  Discontinued          Objective    Exam  /88 (BP Location: Left arm, Patient Position: Chair, Cuff Size: Adult Large)   Pulse 73   Temp 97.4  F (36.3  C) (Tympanic)   Resp 16   Ht 1.702 m (5' 7\")   Wt 103.4 kg (228 lb)   SpO2 99%   BMI 35.71 kg/m     Estimated body mass index is 35.71 kg/m  as calculated from the following:    Height as of this encounter: 1.702 m (5' 7\").    Weight as of this encounter: 103.4 kg (228 lb).    Physical Exam  Constitutional:       Appearance: Normal appearance. He is well-developed.   HENT:      Head: " Normocephalic and atraumatic.      Right Ear: Tympanic membrane and external ear normal. No middle ear effusion.      Left Ear: Tympanic membrane and external ear normal.  No middle ear effusion.      Nose: No mucosal edema.   Neck:      Thyroid: No thyromegaly.      Vascular: No carotid bruit.   Cardiovascular:      Rate and Rhythm: Normal rate and regular rhythm.      Heart sounds: Normal heart sounds.   Pulmonary:      Effort: Pulmonary effort is normal.      Breath sounds: Normal breath sounds.   Abdominal:      General: Bowel sounds are normal.      Palpations: Abdomen is soft.   Skin:     General: Skin is warm and dry.   Neurological:      Mental Status: He is alert.   Psychiatric:         Behavior: Behavior normal.             3/13/2024   Mini Cog   Mini-Cog Not Completed (choose reason) Patient declines         Vision Screen  Patient wears corrective lenses (select all that apply): Worn during vision screen  Vision Screen Results: Pass      Signed Electronically by: BROOKLYN Cruz CNP

## 2024-03-14 NOTE — TELEPHONE ENCOUNTER
PRIOR AUTHORIZATION DENIED    Medication: TETRABENAZINE 25 MG PO TABS  Insurance Company: [employer] Part D - Phone [phone] Fax [phone]  Denial Date: 3/12/2024  Denial Reason(s): Requests patient try/fail deutrabenazine  Appeal Information:     Patient Notified: Yes          Thank you,    Duyen Ruiz CPh-T  Specialty Pharmacy Clinic Liaison - CardiologyNeurologyMultiple Sclerosis  [facility] [address]  [address] [place] [address]  Ph: [phone] Fax: [phone]  [email or web address]

## 2024-03-15 ENCOUNTER — TELEPHONE (OUTPATIENT)
Dept: PSYCHIATRY | Facility: CLINIC | Age: 34
End: 2024-03-15
Payer: MEDICARE

## 2024-03-15 DIAGNOSIS — G24.01 TARDIVE DYSKINESIA: Primary | ICD-10-CM

## 2024-03-15 NOTE — TELEPHONE ENCOUNTER
PA Initiation    Medication: AUSTEDO 6 MG PO TABS  Insurance Company: [employer] Part D - Phone [phone] Fax [phone]  Pharmacy Filling the Rx: [facility] - [place], [place] - [facility] [address]  Filling Pharmacy Phone:    Filling Pharmacy Fax:    Start Date: 3/15/2024       SOMMER GARCIA (Arnett: [id number])

## 2024-03-15 NOTE — TELEPHONE ENCOUNTER
[facility] Call Center    Phone Message    May a detailed message be left on voicemail: yes     Reason for Call: Other: [facility] calling requesting for updated medication list from provider. Fax number [phone]     Action Taken: Message routed to:  Other: Nursing pool    Travel Screening: Not Applicable

## 2024-03-18 ENCOUNTER — E-VISIT (OUTPATIENT)
Dept: FAMILY MEDICINE | Facility: CLINIC | Age: 34
End: 2024-03-18
Payer: MEDICARE

## 2024-03-18 DIAGNOSIS — R32 URINARY INCONTINENCE, UNSPECIFIED TYPE: Primary | ICD-10-CM

## 2024-03-18 PROCEDURE — 99207 PR NON-BILLABLE SERV PER CHARTING: CPT | Performed by: NURSE PRACTITIONER

## 2024-03-19 NOTE — TELEPHONE ENCOUNTER
Prior Authorization Approval    Medication: AUSTEDO 6 MG PO TABS  Authorization Effective Date: 1/1/2024  Authorization Expiration Date: 3/15/2025  Approved Dose/Quantity: 30 days/#30  Reference #: [id number]   Insurance Company: [employer] [place] Part D - Phone [phone] Fax [phone]  Expected CoPay: $ 0  CoPay Card Available:      Financial Assistance Needed: N/A  Which Pharmacy is filling the prescription: [facility] - [place], [place] - [address]  Pharmacy Notified: Yes  Patient Notified: Yes        Thank you,    Duyen Ruiz CPh-T  Specialty Pharmacy Clinic Liaison - CardiologyNeurologyMultiple Sclerosis  [facility]  [address] [place] [address]  3rd [place] [address]  Ph: [phone] Fax: [phone]  [email or web address]

## 2024-03-21 ENCOUNTER — VIRTUAL VISIT (OUTPATIENT)
Dept: FAMILY MEDICINE | Facility: CLINIC | Age: 34
End: 2024-03-21
Payer: MEDICARE

## 2024-03-21 DIAGNOSIS — N39.3 FEMALE STRESS INCONTINENCE: ICD-10-CM

## 2024-03-21 DIAGNOSIS — R05.3 CHRONIC COUGH: Primary | ICD-10-CM

## 2024-03-21 DIAGNOSIS — Z72.0 NICOTINE ABUSE: ICD-10-CM

## 2024-03-21 PROCEDURE — 99213 OFFICE O/P EST LOW 20 MIN: CPT | Mod: 95 | Performed by: NURSE PRACTITIONER

## 2024-03-21 NOTE — PROGRESS NOTES
Prakash is a 33 year old who is being evaluated via a billable video visit.    How would you like to obtain your AVS? MyChart  If the video visit is dropped, the invitation should be resent by: Text to cell phone: [phone]  Will anyone else be joining your video visit? No      Assessment & Plan     Chronic cough  Will obtain PFTs.  Obtain CT scan of chest.  Consider inhaled corticosteroid in the future or trial of albuterol.  Full plan will depend on outcome  - General PFT Lab (Please always keep checked); Future  - CT Chest w/o Contrast; Future    Female stress incontinence  Refer to pelvic floor therapy.  - Physical Therapy  Referral; Future    Nicotine abuse  Encouraged to quit smoking/vaping      Subjective   Prakash is a 33 year old, presenting for the following health issues:  Dizziness    Video Start Time: 1:30 PM    HPI     Dizziness  Onset/Duration: 2 months   Description:   Do you feel faint: No  Does it feel like the surroundings (bed, room) are moving: No  Unsteady/off balance: No  Have you passed out or fallen: YES.   Progression of Symptoms: worsening  Accompanying Signs & Symptoms:  Heart palpitations or chest pain: No  Nausea, vomiting: No  Weakness or lack of coordination in arms or legs: No  Vision or speech changes: No  Numbness or tingling: No  Ringing in ears (Tinnitus): No  Hearing Loss: No  History:   Head trauma/concussion history: YES- 1 concussion in the past about 20 years ago.   Previous similar symptoms: No  Recent bleeding history: No  Any new medications (BP?): No  Precipitating factors:   Worse with activity: No  Worse with head movement: No  Alleviating factors:   Does staying in a fixed position give relief: no   Therapies tried and outcome: none         Video visit completed.     When coughs will nearly pass out. Will be coughing a lot. This has been getting progressively worse over the last month. Does smoke and vape. No SOB or wheezing. When coughs will have tunnel  vision with loss of body control. If stops coughing and breaths in a few times that sensation goes away. Has passed out in the past from it, states several times. No heartburn or reflux. No wheezing.     Leaks urine when sneezes or coughs, Does not lose urine when jumps or laughs. Started about a month ago.  History of cauda equina.  Uncertain if empties bladder all the way when goes to the bathroom.  No urge incontinence.  No pain with urination.  No urinary frequency.  No flank pain.  No fevers or chills.  Bowels moving regularly.  No fecal incontinence.    Objective           Vitals:  No vitals were obtained today due to virtual visit.    Physical Exam  Constitutional:       General: He is not in acute distress.     Appearance: Normal appearance. He is not toxic-appearing.   HENT:      Nose: No congestion.   Eyes:      General: Lids are normal.   Pulmonary:      Effort: Pulmonary effort is normal. No tachypnea, bradypnea or respiratory distress.   Skin:     Coloration: Skin is not ashen, cyanotic, jaundiced or pale.   Neurological:      Mental Status: He is alert.   Psychiatric:         Mood and Affect: Mood normal.         Speech: Speech normal.         Behavior: Behavior normal. Behavior is cooperative.              Video-Visit Details    Type of service:  Video Visit   Video End Time:1:45 PM  Originating Location (pt. Location): Home    Distant Location (provider location):  Off-site  Platform used for Video Visit: Evens  Signed Electronically by: BROOKLYN Cruz CNP

## 2024-03-22 ENCOUNTER — HOSPITAL ENCOUNTER (EMERGENCY)
Facility: CLINIC | Age: 34
Discharge: HOME | End: 2024-03-22
Attending: EMERGENCY MEDICINE | Admitting: EMERGENCY MEDICINE
Payer: MEDICARE

## 2024-03-22 ENCOUNTER — TELEPHONE (OUTPATIENT)
Dept: FAMILY MEDICINE | Facility: CLINIC | Age: 34
End: 2024-03-22
Payer: MEDICARE

## 2024-03-22 ENCOUNTER — TELEPHONE (OUTPATIENT)
Dept: PSYCHIATRY | Facility: CLINIC | Age: 34
End: 2024-03-22

## 2024-03-22 VITALS
DIASTOLIC BLOOD PRESSURE: 86 MMHG | OXYGEN SATURATION: 95 % | SYSTOLIC BLOOD PRESSURE: 136 MMHG | HEART RATE: 104 BPM | TEMPERATURE: 99.1 F | RESPIRATION RATE: 19 BRPM

## 2024-03-22 DIAGNOSIS — T50.904A OVERDOSE OF UNDETERMINED INTENT, INITIAL ENCOUNTER: ICD-10-CM

## 2024-03-22 PROCEDURE — 99283 EMERGENCY DEPT VISIT LOW MDM: CPT | Performed by: EMERGENCY MEDICINE

## 2024-03-22 PROCEDURE — 250N000013 HC RX MED GY IP 250 OP 250 PS 637: Performed by: EMERGENCY MEDICINE

## 2024-03-22 PROCEDURE — 99284 EMERGENCY DEPT VISIT MOD MDM: CPT | Performed by: EMERGENCY MEDICINE

## 2024-03-22 RX ORDER — QUETIAPINE FUMARATE 50 MG/1
50 TABLET, FILM COATED ORAL ONCE
Status: COMPLETED | OUTPATIENT
Start: 2024-03-22 | End: 2024-03-22

## 2024-03-22 RX ADMIN — NICOTINE POLACRILEX 4 MG: 2 GUM, CHEWING ORAL at 21:16

## 2024-03-22 RX ADMIN — NICOTINE POLACRILEX 4 MG: 2 GUM, CHEWING ORAL at 20:09

## 2024-03-22 RX ADMIN — NICOTINE POLACRILEX 4 MG: 2 GUM, CHEWING ORAL at 18:11

## 2024-03-22 RX ADMIN — NICOTINE POLACRILEX 4 MG: 2 GUM, CHEWING ORAL at 17:06

## 2024-03-22 RX ADMIN — QUETIAPINE FUMARATE 50 MG: 50 TABLET ORAL at 20:47

## 2024-03-22 ASSESSMENT — ACTIVITIES OF DAILY LIVING (ADL)
ADLS_ACUITY_SCORE: 39

## 2024-03-22 ASSESSMENT — COLUMBIA-SUICIDE SEVERITY RATING SCALE - C-SSRS
1. IN THE PAST MONTH, HAVE YOU WISHED YOU WERE DEAD OR WISHED YOU COULD GO TO SLEEP AND NOT WAKE UP?: NO
6. HAVE YOU EVER DONE ANYTHING, STARTED TO DO ANYTHING, OR PREPARED TO DO ANYTHING TO END YOUR LIFE?: NO
2. HAVE YOU ACTUALLY HAD ANY THOUGHTS OF KILLING YOURSELF IN THE PAST MONTH?: NO

## 2024-03-22 NOTE — TELEPHONE ENCOUNTER
Received call from patient. Patient calling as he was informed by imaging that he will not be able to schedule CT scan that was ordered anywhere else but [facility]. CT scan was ordered at virtual visit yesterday for chronic cough.     Patient explaining he usually is able to be scheduled at [facility] for imaging, and is not restricted when it comes to imaging. Patient lives in [place] therefore a far commute.    Advised to patient to reach out to imaging to further clarify, otherwise to reach out to insurance company to clarify.     Patient explains he would like to avoid this, and would like to let PCP know that he no longer would like to go through with CT scan of the chest and clarify if any other options.    Please advise.     JIMBO Campbell RN  [facility]

## 2024-03-22 NOTE — ED PROVIDER NOTES
"ED Provider Note  [facility]      History     Chief Complaint   Patient presents with    Drug Overdose     Pt reported \"impulsively\" taking 6 adderal XR tabs from his staff and impulsively drank them. Pt denies SI.     HPI  Prakash Prasad is a 33 year old adult with a past medical history of ADHD, bipolar 1 disorder, borderline personality disorder, chronic low back pain, depression, diabetes mellitus type 2, GERD, history of TBI, hypertension, polysubstance use disorder, and PTSD who presents to the emergency department from his group home seeking evaluation of a medication ingestion. The patient reports that as group home staff were sorting medications that were delivered, he took one off the counter, ran away, and ingested 6x 15mg XR Adderall pills. He states that he did this out of impulsivity, and that it was not an attempt to hurt or kill himself. He denies any other drug use beyond marijuana. The patient takes Adderall normally. Patient is present with group home staff. He states that he has been becoming increasingly impulsive lately.        Past Medical History  Past Medical History:   Diagnosis Date    ADHD (attention deficit hyperactivity disorder)     Bipolar 1 disorder     Borderline personality disorder     Cauda equina syndrome     Chronic low back pain     Depression     Diabetes mellitus, type 2 (H) 1/19/2023    GERD (gastroesophageal reflux disease)     h/o TBI (traumatic brain injury)     Hypertension, unspecified type 12/16/2021    Marginal corneal ulcer, left 07/17/2015    Nephrolithiasis     obesity     Polysubstance abuse - methamphetamine, hallucinagen, heroin, marijuana     currently in remission    PONV (postoperative nausea and vomiting)     PTSD (post-traumatic stress disorder)      Past Surgical History:   Procedure Laterality Date    BACK SURGERY  12/24/2016    BACK SURGERY - For Cauda Equina Syndrome  12/24/2016    COLONOSCOPY      COMBINED CYSTOSCOPY, " INSERT STENT URETER(S) Left 8/30/2018    Procedure: COMBINED CYSTOSCOPY, INSERT STENT URETER(S);  Cystoscopy With Left Stent Placement;  Surgeon: Kiran Ulrich MD;  Location: [facility] OR    COMBINED CYSTOSCOPY, RETROGRADES, EXCHANGE STENT URETER(S) Left 10/14/2018    Procedure: COMBINED CYSTOSCOPY, RETROGRADES, EXCHANGE STENT URETER(S);  Cystoscopy and removal of left-sided stent.;  Surgeon: Stiven Olivo MD;  Location:  OR    COMBINED CYSTOSCOPY, RETROGRADES, URETEROSCOPY, INSERT STENT  1/6/2014    Procedure: COMBINED CYSTOSCOPY, RETROGRADES, URETEROSCOPY, INSERT STENT;  Cystyoscopy place left ureteral stent;  Surgeon: Jaun Kimble MD;  Location: [facility] OR    COMBINED CYSTOSCOPY, RETROGRADES, URETEROSCOPY, INSERT STENT Left 10/23/2018    Procedure: Video cystoscopy, left ureteroscopy with stone extraction;  Surgeon: Bull Mast MD;  Location:  OR    CYSTOSCOPY, URETEROSCOPY, COMBINED Right 9/23/2015    Procedure: COMBINED CYSTOSCOPY, URETEROSCOPY;  Surgeon: ROME Jett MD;  Location: [facility] OR    ENT SURGERY      ESOPHAGOSCOPY, GASTROSCOPY, DUODENOSCOPY (EGD), COMBINED  4/8/2013    Procedure: COMBINED ESOPHAGOSCOPY, GASTROSCOPY, DUODENOSCOPY (EGD), BIOPSY SINGLE OR MULTIPLE;  Gastroscopy;  Surgeon: Peter Pickard MD;  Location: [facility] GI    ESOPHAGOSCOPY, GASTROSCOPY, DUODENOSCOPY (EGD), COMBINED Left 10/28/2014    Procedure: COMBINED ESOPHAGOSCOPY, GASTROSCOPY, DUODENOSCOPY (EGD), BIOPSY SINGLE OR MULTIPLE;  Surgeon: Narcisa Ramirez MD;  Location:  OR    ESOPHAGOSCOPY, GASTROSCOPY, DUODENOSCOPY (EGD), COMBINED N/A 12/24/2018    Procedure: COMBINED ESOPHAGOSCOPY, GASTROSCOPY, DUODENOSCOPY (EGD), BIOPSY SINGLE OR MULTIPLE;  Surgeon: Sonu Verduzco MD;  Location: [facility] GI    INJECT EPIDURAL TRANSFORAMINAL LUMBAR / SACRAL EA ADDITIONAL LEVEL Left 3/18/2021    Procedure: Left L5/S1 transforaminal injection for selective L5 nerve root block;  Surgeon: Eliza Pagan MD;  Location: [facility]  OR    LAPAROSCOPIC CHOLECYSTECTOMY  11/20/2014    [facility], Dr. Ramirez    LASER HOLMIUM LITHOTRIPSY URETER(S), INSERT STENT, COMBINED  4/2/2014    Procedure: COMBINED CYSTOSCOPY, URETEROSCOPY, LASER HOLMIUM LITHOTRIPSY URETER(S), INSERT STENT;  Cystoscopy,Left Ureteral Stent Removal,Left Ureteroscopy with Laser Lithotripsy,Left Ureter Stent Placement;  Surgeon: ROME Jett MD;  Location: [facility] OR    Transurethral stone resection  03/11/2011     ACE/ARB/ARNI NOT PRESCRIBED (INTENTIONAL)  amLODIPine (NORVASC) 5 MG tablet  amphetamine-dextroamphetamine (ADDERALL XR) 15 MG 24 hr capsule  benzoyl peroxide 5 % external liquid  blood glucose (NO BRAND SPECIFIED) test strip  clindamycin (CLEOCIN T) 1 % external lotion  clonazePAM (KLONOPIN) 0.5 MG tablet  Continuous Blood Gluc  (FREESTYLE COLTEN 2 READER) ZEINAB  Continuous Blood Gluc Sensor (FREESTYLE COLTEN 2 SENSOR) MISC  deutetrabenazine (AUSTEDO) 6 MG tablet  doxycycline hyclate (VIBRAMYCIN) 100 MG capsule  empagliflozin (JARDIANCE) 10 MG TABS tablet  fish oil-omega-3 fatty acids 1000 MG capsule  fluPHENAZine decanoate (PROLIXIN) 25 MG/ML injection  insulin glargine (LANTUS PEN) 100 UNIT/ML pen  insulin pen needle (BD SAMANTHA U/F) 32G X 4 MM miscellaneous  lithium (ESKALITH CR/LITHOBID) 450 MG CR tablet  nicotine (NICODERM CQ) 21 MG/24HR 24 hr patch  ondansetron (ZOFRAN ODT) 4 MG ODT tab  propranolol (INDERAL) 10 MG tablet  QUEtiapine (SEROQUEL) 200 MG tablet  QUEtiapine (SEROQUEL) 25 MG tablet  rosuvastatin (CRESTOR) 40 MG tablet  Semaglutide (RYBELSUS) 3 MG tablet  Semaglutide (RYBELSUS) 7 MG tablet  testosterone (ANDROGEL/TESTIM) 50 MG/5GM (1%) topical gel  thin (NO BRAND SPECIFIED) lancets      Allergies   Allergen Reactions    Haldol [Haloperidol] Other (See Comments)     Makes patient very angry and anxious    Adhesive Tape Hives    Percocet [Oxycodone-Acetaminophen] Nausea and Vomiting    Prednisone Other (See Comments) and Hives     Suicidal  ideation    Risperidone Other (See Comments)    Tramadol Hcl Nausea and Vomiting    Droperidol Anxiety    Seroquel [Quetiapine] Palpitations     Spent 2 weeks in the hospital due to having seroquel, caused palpitations and QT prolongation     Family History  Family History   Problem Relation Age of Onset    Hyperlipidemia Mother     Mental Illness Mother     Anxiety Disorder Mother     Anesthesia Reaction Mother         Vomiting/Nausea    Other Cancer Mother     Skin Cancer Mother     Gastrointestinal Disease Father         Crohn's Disease    Cancer Father         Liver Cancer    Other Cancer Father         Liver    Obesity Father     Skin Cancer Father     No Known Problems Sister     Hypertension Brother     Other Cancer Brother         Esophagecial    Diabetes Brother     Obesity Brother     Hypertension Brother     Other Cancer Brother     Cancer Maternal Grandmother         lympoma    Diabetes Maternal Grandmother     Mental Illness Maternal Grandmother     Other Cancer Maternal Grandmother         Non Hodgkins Lymphoma    Diabetes Maternal Grandfather     Hypertension Maternal Grandfather     Prostate Cancer Maternal Grandfather     Hyperlipidemia Maternal Grandfather     Heart Disease Paternal Grandfather         heart disease    Other Cancer Paternal Half-Brother      Social History   Social History     Tobacco Use    Smoking status: Former     Packs/day: 0.50     Years: 5.00     Additional pack years: 0.00     Total pack years: 2.50     Types: Other, Cigarettes     Start date: 2011     Quit date: 2022     Years since quittin.5     Passive exposure: Never    Smokeless tobacco: Former     Types: Chew     Quit date: 2019    Tobacco comments:     Just nicotine gum currently. 23   Vaping Use    Vaping Use: Former    Substances: Nicotine, Flavoring    Devices: Refillable tank   Substance Use Topics    Alcohol use: No    Drug use: Not Currently     Types: Marijuana, Opiates     Comment:  "denies using oxy or other opiates_\"not for years\"      Past medical history, past surgical history, medications, allergies, family history, and social history were reviewed with the patient. No additional pertinent items.      A medically appropriate review of systems was performed with pertinent positives and negatives noted in the HPI, and all other systems negative.    Physical Exam   BP: (!) 144/88  Pulse: 88  Temp: 99  F (37.2  C)  Resp: 18  SpO2: 96 %  Physical Exam  Vitals reviewed.   Constitutional:       General: He is not in acute distress.     Appearance: He is not diaphoretic.   HENT:      Head: Normocephalic and atraumatic.      Right Ear: External ear normal.      Left Ear: External ear normal.      Nose: Nose normal. No rhinorrhea.      Mouth/Throat:      Mouth: Mucous membranes are moist.   Eyes:      General: No scleral icterus.     Extraocular Movements: Extraocular movements intact.      Conjunctiva/sclera: Conjunctivae normal.   Cardiovascular:      Rate and Rhythm: Normal rate and regular rhythm.      Heart sounds: Normal heart sounds.   Pulmonary:      Effort: No respiratory distress.      Breath sounds: Normal breath sounds.   Abdominal:      General: Bowel sounds are normal.      Palpations: Abdomen is soft.      Tenderness: There is no abdominal tenderness.   Musculoskeletal:         General: No deformity. Normal range of motion.      Cervical back: Normal range of motion and neck supple.   Skin:     General: Skin is warm.      Findings: No rash.   Neurological:      Mental Status: Mental status is at baseline.   Psychiatric:         Mood and Affect: Mood normal.         Behavior: Behavior normal.           ED Course, Procedures, & Data      Procedures            No results found for any visits on 03/22/24.  Medications   nicotine (NICORETTE) gum 4 mg (4 mg Buccal $Given 3/22/24 2116)   QUEtiapine (SEROquel) tablet 50 mg (50 mg Oral $Given 3/22/24 2047)     Labs Ordered and Resulted from " Time of ED Arrival to Time of ED Departure - No data to display  No orders to display          Medical Decision Making  The patient's presentation is strongly suggestive of low complexity (an acute and uncomplicated illness or injury).    The patient's evaluation involved:  review of external note(s) from 3+ sources (Most recent H&P in addition to clinic and ED note)  review of 2 test result(s) ordered prior to this encounter (Most recent BMP and CBC)    The patient's management involved moderate risk (prescription drug management including medications given in the ED).    Assessment & Plan      33-year-old male presents to us with a chief complaint of a intentional overdose of Adderall.  This was not an actual suicide attempt this was an impulsive action which the patient states he now regrets.  He does have a history of of mental health issues.  No current symptoms right now.  4:50PM - Discussed with poison control who recommended observation for at least 7 hours, as that is when Adderall concentration will peak in the bloodstream.  Upon reevaluation past the 7-hour kalyn he is mildly anxious but no other major symptoms.  He is feeling comfortable for discharge home.  We did give him a dose of his home as needed Seroquel to help with anxiety earlier today.      I have reviewed the nursing notes. I have reviewed the findings, diagnosis, plan and need for follow up with the patient.    New Prescriptions    No medications on file       Final diagnoses:   Overdose of undetermined intent, initial encounter   I, Gennaro Ulloa, am serving as a trained medical scribe to document services personally performed by Espinoza Rodriguez DO, based on the provider's statements to me.     I, Espinoza Rodriguez DO, was physically present and have reviewed and verified the accuracy of this note documented by Gennaro Ulloa.      Espinoza Rodriguez DO  [facility] located within [facility] EMERGENCY DEPARTMENT  3/22/2024     Espinoza Rodriguez  Carlin, DO  03/22/24 3920

## 2024-03-22 NOTE — ED TRIAGE NOTES
"Pt resides at [facility] in [place]. Said he impulsively grabbed 6 caps (15mgs ea)  of adderal from  staff and took them, pt denies feeling suicidal. Pt said \"it was a stupid thing to do\" but reported that he couldn't control his impulse. Pt also reports being on a commitment.      Triage Assessment (Adult)       Row Name 03/22/24 3212          Triage Assessment    Airway WDL WDL        Respiratory WDL    Respiratory WDL WDL        Skin Circulation/Temperature WDL    Skin Circulation/Temperature WDL WDL        Cardiac WDL    Cardiac WDL WDL        Peripheral/Neurovascular WDL    Peripheral Neurovascular WDL WDL                     "

## 2024-03-22 NOTE — TELEPHONE ENCOUNTER
Writer received incoming phone call from the patient. He was initially hesitant to speak with this writer, but eventually shared that he impulsively took 6 capsules of Adderall XR 15 mg approximately 15 mins before the phone call. Patient adamantly denied this was a suicide attempt. At the time of the call, he denied any physical changes/symptoms, but felt embarrassed. Writer encouraged the patient to come into the ER for potential risks of taking such a high dose, which he refused. Discussed this further with his provider, BROOKLYN Calvin, CNP who confirmed he must come into the ER for an evaluation in person. The patient ultimately agreed after hearing this was Regine's recommendation as well.    Spoke with the patient's GH manager. She was at the home and agreed to drive the patient to the [facility] emergency department. Patient is coming from [place], [place]. Writer called the [facility] ED and spoke with Danial, charge nurse. He took report and will prepare for patient's arrival.     Colleen Floyd RN on 3/22/2024 at 3:35 PM

## 2024-03-25 ENCOUNTER — HOSPITAL ENCOUNTER (INPATIENT)
Facility: CLINIC | Age: 34
LOS: 16 days | Discharge: NURSING, REHAB OR LONG-TERM CARE | DRG: 883 | End: 2024-04-12
Attending: FAMILY MEDICINE | Admitting: PSYCHIATRY & NEUROLOGY
Payer: MEDICARE

## 2024-03-25 ENCOUNTER — TELEPHONE (OUTPATIENT)
Dept: FAMILY MEDICINE | Facility: CLINIC | Age: 34
End: 2024-03-25
Payer: MEDICARE

## 2024-03-25 ENCOUNTER — TELEPHONE (OUTPATIENT)
Dept: FAMILY MEDICINE | Facility: CLINIC | Age: 34
End: 2024-03-25

## 2024-03-25 ENCOUNTER — TELEPHONE (OUTPATIENT)
Dept: PSYCHIATRY | Facility: CLINIC | Age: 34
End: 2024-03-25
Payer: MEDICARE

## 2024-03-25 DIAGNOSIS — M54.17 LUMBOSACRAL RADICULOPATHY AT L5: ICD-10-CM

## 2024-03-25 DIAGNOSIS — F33.1 MODERATE EPISODE OF RECURRENT MAJOR DEPRESSIVE DISORDER (H): ICD-10-CM

## 2024-03-25 DIAGNOSIS — E11.22 TYPE 2 DIABETES MELLITUS WITH CHRONIC KIDNEY DISEASE, WITHOUT LONG-TERM CURRENT USE OF INSULIN, UNSPECIFIED CKD STAGE (H): ICD-10-CM

## 2024-03-25 DIAGNOSIS — F25.9 SCHIZOAFFECTIVE DISORDER, CHRONIC CONDITION WITH ACUTE EXACERBATION (H): ICD-10-CM

## 2024-03-25 DIAGNOSIS — M54.42 ACUTE BILATERAL LOW BACK PAIN WITH LEFT-SIDED SCIATICA: ICD-10-CM

## 2024-03-25 DIAGNOSIS — F25.9 SCHIZOPHRENIA, SCHIZOAFFECTIVE, CHRONIC WITH ACUTE EXACERBATION (H): Primary | ICD-10-CM

## 2024-03-25 DIAGNOSIS — K21.00 GASTROESOPHAGEAL REFLUX DISEASE WITH ESOPHAGITIS WITHOUT HEMORRHAGE: ICD-10-CM

## 2024-03-25 DIAGNOSIS — E11.65 TYPE 2 DIABETES MELLITUS WITH HYPERGLYCEMIA, WITHOUT LONG-TERM CURRENT USE OF INSULIN (H): ICD-10-CM

## 2024-03-25 DIAGNOSIS — R46.89 AGGRESSIVE BEHAVIOR: ICD-10-CM

## 2024-03-25 DIAGNOSIS — F60.3 BORDERLINE PERSONALITY DISORDER (H): Chronic | ICD-10-CM

## 2024-03-25 DIAGNOSIS — F90.9 ATTENTION DEFICIT HYPERACTIVITY DISORDER (ADHD), UNSPECIFIED ADHD TYPE: ICD-10-CM

## 2024-03-25 DIAGNOSIS — F41.1 GAD (GENERALIZED ANXIETY DISORDER): ICD-10-CM

## 2024-03-25 DIAGNOSIS — M79.641 PAIN OF RIGHT HAND: ICD-10-CM

## 2024-03-25 DIAGNOSIS — E66.01 MORBID OBESITY (H): ICD-10-CM

## 2024-03-25 DIAGNOSIS — R68.2 DRY MOUTH: ICD-10-CM

## 2024-03-25 PROCEDURE — 99285 EMERGENCY DEPT VISIT HI MDM: CPT | Performed by: FAMILY MEDICINE

## 2024-03-25 ASSESSMENT — COLUMBIA-SUICIDE SEVERITY RATING SCALE - C-SSRS
6. HAVE YOU EVER DONE ANYTHING, STARTED TO DO ANYTHING, OR PREPARED TO DO ANYTHING TO END YOUR LIFE?: YES
1. IN THE PAST MONTH, HAVE YOU WISHED YOU WERE DEAD OR WISHED YOU COULD GO TO SLEEP AND NOT WAKE UP?: NO
2. HAVE YOU ACTUALLY HAD ANY THOUGHTS OF KILLING YOURSELF IN THE PAST MONTH?: NO

## 2024-03-25 ASSESSMENT — ACTIVITIES OF DAILY LIVING (ADL): ADLS_ACUITY_SCORE: 37

## 2024-03-25 NOTE — TELEPHONE ENCOUNTER
Called to check in with patient after ED visit. Patient states that he has been having increasing issues with impulsivity. He took the Adderall just very impulsively. He is not sure why he is having this increase, no recent med changes other than meds for medical issues. He would like to discuss this at the appointment tomorrow with Regine. No safety concerns at this time.

## 2024-03-25 NOTE — TELEPHONE ENCOUNTER
Writer connect with  staff member, Tom (ph: [phone]). He also reports the patient is doing much better since his discharge from the hospital. No safety concerns. Writer reminded Tom of the patient's video visit with Regine Last tomorrow. He will remind the patient of this as well.    Colleen Floyd RN on 3/25/2024 at 12:32 PM

## 2024-03-25 NOTE — TELEPHONE ENCOUNTER
Reason for Call:  Appointment Request    Patient requesting this type of appt: Chronic Diease Management/Medication/Follow-Up    Requested provider: Becki Avery    Reason patient unable to be scheduled: Not within requested timeframe    When does patient want to be seen/preferred time: 3-7 days    Comments: Patient is requesting a follow up on sciatic pain that is getting worse and is requesting a virtual visit instead of an office visit    Could we send this information to you in [employer] or would you prefer to receive a phone call?:   Patient would prefer a phone call   Okay to leave a detailed message?: Yes at Cell number on file:    Telephone Information:   Mobile [phone]       Call taken on 3/25/2024 at 4:53 PM by Nina Coleman

## 2024-03-25 NOTE — TELEPHONE ENCOUNTER
RN spoke with scheduling for imaging at they state that CT is scheduled in  for imaging and  sees it is authorized for 4/2/24. She is unaware why patient was told restricted to [facility] as it appears it has been okayed to be completed at .      RN states she sees on patients chart restricted to [facility] but patient states has previously done [facility] for imaging as it is a closer commute to patients home.     Imaging stated on their end it seems like everything is good to go to have this completed in . They advised to have patient call and double check with insurance about coverage.     RN will relay this to patient. RN attempted to call patient with no answer RN left VM to call clinic back. Please relay message above about calling insurance to patient when calling back.     Please also see Hosp Follow-up encounter RN attempted to call patient on earlier from 3/25/24.     Rossy Guerrero RN on 3/25/2024 at 10:16 AM

## 2024-03-25 NOTE — TELEPHONE ENCOUNTER
Pt returning call, RN relayed message below. Pt has CT scheduled for 4/2/24.     Jie Walters RN on 3/25/2024 at 12:40 PM

## 2024-03-25 NOTE — TELEPHONE ENCOUNTER
Called patient and left a voicemail to return our call to the clinic.       Encounter 1 of 2. Please also see TE from 3/22/24 with Becki Avery when patient calls back     Rossy Guerrero RN on 3/25/2024 at 10:28 AM

## 2024-03-25 NOTE — TELEPHONE ENCOUNTER
"  ED for acute condition Discharge Protocol    \"Hi, my name is Jie Walters RN, a registered nurse, and I am calling from [facility].  I am calling to follow up and see how things are going for you after your recent emergency visit.\"    Tell me how you are doing now that you are home?\" Doing alright       Discharge Instructions    \"Let's review your discharge instructions.  What is/are the follow-up recommendations?  Pt. Response: Pt states that they have a psychiatry appointment 3/26/24. Pt did not receive follow up recommendations.    \"Has an appointment with your primary care provider been scheduled?\"  No (needed - schedule appointment and remind to bring meds)    Medications    \"Tell me what changed about your medicines when you discharged?\"    none    \"What questions do you have about your medications?\"   None        Call Summary    \"What questions or concerns do you have about your recent visit and your follow-up care?\"     none    \"If you have questions or things don't continue to improve, we encourage you contact us through the main clinic number (give number).  Even if the clinic is not open, triage nurses are available 24/7 to help you. \"    \"Thank you for your time and take care!\"      Jie Walters RN on 3/25/2024 at 12:39 PM    "

## 2024-03-25 NOTE — TELEPHONE ENCOUNTER
See 322/24 ER notes plan:      Assessment & Plan       33-year-old male presents to us with a chief complaint of a intentional overdose of Adderall.  This was not an actual suicide attempt this was an impulsive action which the patient states he now regrets.  He does have a history of of mental health issues.  No current symptoms right now.  4:50PM - Discussed with poison control who recommended observation for at least 7 hours, as that is when Adderall concentration will peak in the bloodstream.  Upon reevaluation past the 7-hour kalyn he is mildly anxious but no other major symptoms.  He is feeling comfortable for discharge home.  We did give him a dose of his home as needed Seroquel to help with anxiety earlier today.      No stated plan for follow up with PCP.  Has video visit with psych tomorrow.  Last encounter with Becki Avery was virtual visit 3/21/24.      Routed to RN team to follow up with patient.    Edith HERNANDEZ RN  [facility] Triage

## 2024-03-26 ENCOUNTER — TELEPHONE (OUTPATIENT)
Dept: PSYCHIATRY | Facility: CLINIC | Age: 34
End: 2024-03-26

## 2024-03-26 ENCOUNTER — TELEPHONE (OUTPATIENT)
Dept: BEHAVIORAL HEALTH | Facility: CLINIC | Age: 34
End: 2024-03-26

## 2024-03-26 PROBLEM — F25.0 SCHIZOAFFECTIVE DISORDER, BIPOLAR TYPE (H): Status: ACTIVE | Noted: 2017-06-30

## 2024-03-26 LAB — GLUCOSE BLDC GLUCOMTR-MCNC: 139 MG/DL (ref 70–99)

## 2024-03-26 PROCEDURE — 250N000013 HC RX MED GY IP 250 OP 250 PS 637: Performed by: STUDENT IN AN ORGANIZED HEALTH CARE EDUCATION/TRAINING PROGRAM

## 2024-03-26 PROCEDURE — 250N000013 HC RX MED GY IP 250 OP 250 PS 637: Performed by: EMERGENCY MEDICINE

## 2024-03-26 PROCEDURE — 82962 GLUCOSE BLOOD TEST: CPT

## 2024-03-26 PROCEDURE — 250N000013 HC RX MED GY IP 250 OP 250 PS 637: Performed by: PSYCHIATRY & NEUROLOGY

## 2024-03-26 RX ORDER — CLONAZEPAM 0.5 MG/1
0.25 TABLET ORAL 2 TIMES DAILY PRN
Status: DISCONTINUED | OUTPATIENT
Start: 2024-03-26 | End: 2024-03-28

## 2024-03-26 RX ORDER — LITHIUM CARBONATE 450 MG
900 TABLET, EXTENDED RELEASE ORAL AT BEDTIME
Status: DISCONTINUED | OUTPATIENT
Start: 2024-03-26 | End: 2024-04-12 | Stop reason: HOSPADM

## 2024-03-26 RX ORDER — PROPRANOLOL HYDROCHLORIDE 10 MG/1
10 TABLET ORAL 3 TIMES DAILY
Status: DISCONTINUED | OUTPATIENT
Start: 2024-03-26 | End: 2024-04-05

## 2024-03-26 RX ORDER — CLONAZEPAM 0.5 MG/1
0.25 TABLET ORAL DAILY PRN
Status: DISCONTINUED | OUTPATIENT
Start: 2024-03-26 | End: 2024-03-26

## 2024-03-26 RX ORDER — AMLODIPINE BESYLATE 5 MG/1
10 TABLET ORAL DAILY
Status: DISCONTINUED | OUTPATIENT
Start: 2024-03-26 | End: 2024-04-12 | Stop reason: HOSPADM

## 2024-03-26 RX ORDER — DOXYCYCLINE 100 MG/1
100 CAPSULE ORAL 2 TIMES DAILY
Status: DISCONTINUED | OUTPATIENT
Start: 2024-03-26 | End: 2024-04-12 | Stop reason: HOSPADM

## 2024-03-26 RX ORDER — ROSUVASTATIN CALCIUM 10 MG/1
40 TABLET, COATED ORAL DAILY
Status: DISCONTINUED | OUTPATIENT
Start: 2024-03-26 | End: 2024-04-12 | Stop reason: HOSPADM

## 2024-03-26 RX ORDER — ONDANSETRON 4 MG/1
4 TABLET, ORALLY DISINTEGRATING ORAL EVERY 8 HOURS PRN
Status: DISCONTINUED | OUTPATIENT
Start: 2024-03-26 | End: 2024-03-27

## 2024-03-26 RX ORDER — QUETIAPINE FUMARATE 200 MG/1
200 TABLET, FILM COATED ORAL AT BEDTIME
Status: DISCONTINUED | OUTPATIENT
Start: 2024-03-26 | End: 2024-04-12 | Stop reason: HOSPADM

## 2024-03-26 RX ORDER — OLANZAPINE 10 MG/1
10 TABLET, ORALLY DISINTEGRATING ORAL 2 TIMES DAILY PRN
Status: DISCONTINUED | OUTPATIENT
Start: 2024-03-26 | End: 2024-03-27

## 2024-03-26 RX ADMIN — OLANZAPINE 10 MG: 10 TABLET, ORALLY DISINTEGRATING ORAL at 19:02

## 2024-03-26 RX ADMIN — NICOTINE POLACRILEX 4 MG: 2 GUM, CHEWING BUCCAL at 10:57

## 2024-03-26 RX ADMIN — LITHIUM CARBONATE 900 MG: 450 TABLET, EXTENDED RELEASE ORAL at 21:30

## 2024-03-26 RX ADMIN — QUETIAPINE FUMARATE 200 MG: 200 TABLET ORAL at 22:34

## 2024-03-26 RX ADMIN — EMPAGLIFLOZIN 10 MG: 10 TABLET, FILM COATED ORAL at 21:30

## 2024-03-26 RX ADMIN — DOXYCYCLINE HYCLATE 100 MG: 100 CAPSULE ORAL at 21:30

## 2024-03-26 RX ADMIN — NICOTINE POLACRILEX 4 MG: 4 GUM, CHEWING BUCCAL at 19:21

## 2024-03-26 RX ADMIN — NICOTINE POLACRILEX 4 MG: 4 GUM, CHEWING BUCCAL at 20:31

## 2024-03-26 RX ADMIN — QUETIAPINE FUMARATE 200 MG: 200 TABLET ORAL at 01:06

## 2024-03-26 RX ADMIN — CLONAZEPAM 0.25 MG: 0.5 TABLET ORAL at 01:06

## 2024-03-26 RX ADMIN — NICOTINE POLACRILEX 4 MG: 4 GUM, CHEWING BUCCAL at 13:02

## 2024-03-26 RX ADMIN — PROPRANOLOL HYDROCHLORIDE 10 MG: 10 TABLET ORAL at 21:29

## 2024-03-26 RX ADMIN — NICOTINE POLACRILEX 4 MG: 4 GUM, CHEWING BUCCAL at 16:48

## 2024-03-26 RX ADMIN — ROSUVASTATIN 40 MG: 20 TABLET, FILM COATED ORAL at 21:31

## 2024-03-26 RX ADMIN — AMLODIPINE BESYLATE 10 MG: 10 TABLET ORAL at 21:30

## 2024-03-26 ASSESSMENT — ACTIVITIES OF DAILY LIVING (ADL)
ADLS_ACUITY_SCORE: 39

## 2024-03-26 NOTE — ED NOTES
Writer spoke with nursing staff (Domenica) at the [facility] he resides in. Writer notified her that patient reports hearing voices that tell him to kill his roommate SUSIE and a staff member there named Jesenia. Domenica indicated this was new information but she was thankful for the call.

## 2024-03-26 NOTE — ED NOTES
Pt informed about [facility] unit expectations and personal cell phone policy. Pt verbalized understanding. Report called to SRINIVAS Hernandez

## 2024-03-26 NOTE — PLAN OF CARE
Randall BRENNAN Prasad  March 26, 2024  Plan of Care Hand-off Note     Patient Care Path: inpatient mental health    Plan for Care:     It is the recommendation of this clinician that pt admit to IP MH for safety and stabilization. Pt displays the following risk factors that support IP admission: Command auditory hallucinations and paranoia with HI. Pt is unable to engage in safety planning to mitigate risk level in a non-secure setting. Lower levels of care would not be sufficient in managing the level of risk pt is presenting with. Due to this IP is the least restrictive option of care for pt. Pt should remain in IP until deemed safe to return to the community and engage in OP MH supports. Pt will be able to resume work with established providers upon discharge.        Identified Goals and Safety Issues: Determine  for civil commitment.  Pt did not provide this information.  Pt is tense and pacing, but not aggressive , in the ED.    Overview:  Domenica, , [phone] Left two messages    Negra Prasad, mother, [phone] No answer      Legal Status: Legal Status at Admission: Voluntary/Patient has signed consent for treatment    Psychiatry Consult:  Yes       Updated   regarding plan of care.           Jeniffer Webber, [facility]

## 2024-03-26 NOTE — TELEPHONE ENCOUNTER
[place]:  [place]  Access Inpatient Bed Call Log 3/26/2024 8:03:11 AM    Intake has called facilities that have not updated their bed status within the last 12 hours.    ADULTS:  [facility] is posting 0 beds.             Pt remains on work list pending appropriate bed availability.     [place]  Access Inpatient Bed Call Log 3/26/2024 @3:40 PM:    Intake has called facilities that have not updated their bed status within the last 12 hours.     ADULTS:   [facility] is posting 0 beds.                 Pt remains on work list pending appropriate bed availability.

## 2024-03-26 NOTE — PROGRESS NOTES
"Triage & Transition Services, Extended Care     Therapy Progress Note    Patient: Prakash goes by \"Prakash,\" uses he/him pronouns  Date of Service: March 26, 2024  Site of Service: [facility] EMERGENCY DEPARTMENT                             UREDH-E  Patient was seen yes  Mode of Assessment: Virtual: AmWell    Presentation Summary: Pt is seen by extended care for therapeutic check-in and reassessment. Exchanged greeting, introduced self and role. Pt presents as calm, cooperative, alert, oriented x3. Eye contact is poor. Pt is disheveld with flat affect. Pt denies having suicidal ideation. Pt endorses hearing voices that tell him to harm his roommate where he resides at staff member at the group home. Pt engages in conversation about his symptoms. He reports that listening to music helps him to distract from the voices. He was listening to music during this interview. He rates the severity of these voices to be at 9/10. He is agreeable to continue with IP plan. He was able to engage in discussion about coping skills and supports.    Therapeutic Intervention(s) Provided: Engaged in guided discovery, explored patient's perspectives and helped expand them through socratic dialogue., Engaged in cognitive restructuring/ reframing, looked at common cognitive distortions and challenged negative thoughts., Explored strategies for self-soothing.    Current Symptoms: anxious, obsessions/compulsions, excessive worry difficulty concentrating, sense of doom, decreased libido, helplessness, hoplessness   inattentive, distractability, auditory hallucinations      Mental Status Exam   Affect: Flat  Appearance: Appropriate, Disheveled  Attention Span/Concentration: Inattentive  Eye Contact: Variable    Fund of Knowledge: Appropriate   Language /Speech Content: Fluent  Language /Speech Volume: Normal  Language /Speech Rate/Productions: Minimally Responsive, Pressured, Normal  Recent Memory: Variable  Remote Memory: " Variable  Mood: Apathetic  Orientation to Person: Yes   Orientation to Place: Yes  Orientation to Time of Day: Yes  Orientation to Date: Yes     Situation (Do they understand why they are here?): Yes  Psychomotor Behavior: Normal  Thought Content: Hallucinations, Paranoia, Homicidal  Thought Form: Paranoia, Obsessive/Perseverative    Treatment Objective(s) Addressed: rapport building, orienting the patient to therapy, identifying and practicing coping strategies, assessing safety    Patient Response to Interventions: acceptance expressed, verbalizes understanding    Progress Towards Goals: Patient Reports Symptoms Are: ongoing  Patient Progress Toward Goals: is not making progress  Comment: Pt could benefit from higher level of service.  Next Step to Work Toward Discharge: symptom stabilization, collaboration with OP team/family/friends  Symptom Stabilization Comment: Patient presents with ongoing auditory command hallucination. He is unable to assess safety or get patient to engage in safety planning.  Collaboration Comment: Pt unable to engage in safety planning.    Case Management:      Plan: inpatient mental health  yes provider, RN Consulted with nursing staff Cinthia Sawant and Lauryn Hall MD  yes    Clinical Substantiation: Pt presented to the ER yesterday with auditory command hallucinations that tell him to kill a staff and resident where he resides. He continues to experience MH distress.After therapeutic assessment, intervention and aftercare planning by ED care team and LMHP and in consultation with attending provider, the patient's circumstances and mental state were appropriate for inpatient behavioral health. Patient is recommended by this clinician to admit IP MH for safety and stabilization.    Legal Status: Legal Status at Admission: Voluntary/Patient has signed consent for treatment    Session Status: Time session started: 1017  Time session ended: 1036  Session Duration (minutes): 19  minutes  Session Number: 1  Anticipated number of sessions or this episode of care: 4    Time Spent: 19 minutes    CPT Code: CPT Codes: 08014 - Psychotherapy (with patient) - 30 (16-37*) min    Diagnosis:   Patient Active Problem List   Diagnosis Code    ADHD (attention deficit hyperactivity disorder) F90.9    Bipolar 1 disorder, manic, mild F31.11    Marijuana abuse F12.10    Polysubstance abuse (H) F19.10    GERD (gastroesophageal reflux disease) K21.9    Tobacco abuse Z72.0    Intractable back pain M54.9    Optic neuritis H46.9    Cauda equina syndrome with neurogenic bladder (H) G83.4    Schizoaffective disorder, bipolar type (H) F25.0    PTSD (post-traumatic stress disorder) F43.10    Anxiety F41.9    Auditory hallucination R44.0    Nephrolithiasis N20.0    Cyst of left ovary N83.202    Borderline personality disorder (H) F60.3    Cannabis use disorder, severe, dependence (H) F12.20    Depression F32.A    Episodic mood disorder (H24) F39    History of heroin abuse (H) F11.11    Moderate episode of recurrent major depressive disorder (H) F33.1    Opioid use disorder, severe, dependence (H) F11.20    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    Nausea R11.0    Urinary retention R33.9    Chronic bilateral low back pain without sciatica M54.50, G89.29    AVA (generalized anxiety disorder) F41.1    Aggressive behavior R46.89    Gender identity disorder F64.9    Lumbosacral radiculopathy at L5 M54.17    DUB (dysfunctional uterine bleeding) N93.8    Seizure-like activity (H) R56.9    Acanthosis nigricans L83    Schizoaffective disorder, chronic condition with acute exacerbation (H) F25.9    Bipolar affective disorder, mixed, severe, with psychotic behavior (H) F31.64    Schizophrenia, schizoaffective, chronic with acute exacerbation (H) F25.9    Akathisia G25.71    Hypertension, unspecified type I10    Female-to-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 person Z78.9    Morbid obesity (H) E66.01    Diabetes mellitus,  type 2 (H) E11.9    Mood disorder due to a general medical condition F06.30    Pain of right hand M79.641    Acne vulgaris L70.0       Primary Problem This Admission: Active Hospital Problems    *Schizoaffective disorder, bipolar type (H)        Carlin Luna, [facility]   Licensed Mental Health Professional (LMHP), [facility]  [phone]

## 2024-03-26 NOTE — PROGRESS NOTES
PT came from ED to [facility], accompanied by ED staff. PT appears anxious  irritable. Pt asked to leave and provider was notified. Pt needs to wait to go upstairs. will continue to monitor PT.

## 2024-03-26 NOTE — TELEPHONE ENCOUNTER
Received a call back from Cristy. Cristy states there was a misunderstanding and reports they pushed really hard for an ACT team, but it was denied. Cristy states that after their assessments, they thought that patient's primary diagnosis would be borderline personality disorder, and that is not eligible for an ACT team. Cristy states that there was a lot of back and forth about this in a recent visit.

## 2024-03-26 NOTE — TELEPHONE ENCOUNTER
Writer attempted to call patient's , Cristy to discuss recent impulsivity and AH (patient currently in the ED). Will strongly encourage establishing with an ACT team as patient and  had declined in the past due to CM seeing patient more often. LVM requesting a call back at the main clinic number.
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
## 2024-03-26 NOTE — ED TRIAGE NOTES
Pt. states hearing voices that are telling him to kill caregiver at [facility] and roommate.   Triage Assessment (Adult)       Row Name 03/25/24 6840          Triage Assessment    Airway WDL WDL        Respiratory WDL    Respiratory WDL WDL        Skin Circulation/Temperature WDL    Skin Circulation/Temperature WDL WDL        Cardiac WDL    Cardiac WDL WDL        Peripheral/Neurovascular WDL    Peripheral Neurovascular WDL WDL        Cognitive/Neuro/Behavioral WDL    Cognitive/Neuro/Behavioral WDL WDL        [place] Coma Scale    Best Eye Response 4-->(E4) spontaneous     Best Motor Response 6-->(M6) obeys commands     Best Verbal Response 5-->(V5) oriented     [place] Coma Scale Score 15

## 2024-03-26 NOTE — ED PROVIDER NOTES
[facility] EMERGENCY DEPARTMENT ([facility])    3/25/24      ED PROVIDER NOTE     History     Chief Complaint   Patient presents with    Hallucinations     HPI  Prakash Prasad is a 33 year old adult with a past medical history of ADHD, bipolar 1 disorder, borderline personality disorder, chronic low back pain, depression, diabetes mellitus type 2, GERD, history of TBI, hypertension, polysubstance use disorder, and PTSD who presents to the ED for evaluation of hallucinations. Patient reports hearing voices that are telling him to kill his caregiver at [facility] and his roommate.     Past Medical History  Past Medical History:   Diagnosis Date    ADHD (attention deficit hyperactivity disorder)     Bipolar 1 disorder     Borderline personality disorder     Cauda equina syndrome     Chronic low back pain     Depression     Diabetes mellitus, type 2 (H) 1/19/2023    GERD (gastroesophageal reflux disease)     h/o TBI (traumatic brain injury)     Hypertension, unspecified type 12/16/2021    Marginal corneal ulcer, left 07/17/2015    Nephrolithiasis     obesity     Polysubstance abuse - methamphetamine, hallucinagen, heroin, marijuana     currently in remission    PONV (postoperative nausea and vomiting)     PTSD (post-traumatic stress disorder)      Past Surgical History:   Procedure Laterality Date    BACK SURGERY  12/24/2016    BACK SURGERY - For Cauda Equina Syndrome  12/24/2016    COLONOSCOPY      COMBINED CYSTOSCOPY, INSERT STENT URETER(S) Left 8/30/2018    Procedure: COMBINED CYSTOSCOPY, INSERT STENT URETER(S);  Cystoscopy With Left Stent Placement;  Surgeon: Kiran Ulrich MD;  Location: [facility] OR    COMBINED CYSTOSCOPY, RETROGRADES, EXCHANGE STENT URETER(S) Left 10/14/2018    Procedure: COMBINED CYSTOSCOPY, RETROGRADES, EXCHANGE STENT URETER(S);  Cystoscopy and removal of left-sided stent.;  Surgeon: Stiven Olivo MD;  Location:  OR    COMBINED CYSTOSCOPY, RETROGRADES, URETEROSCOPY,  INSERT STENT  1/6/2014    Procedure: COMBINED CYSTOSCOPY, RETROGRADES, URETEROSCOPY, INSERT STENT;  Cystyoscopy place left ureteral stent;  Surgeon: Jaun Kimble MD;  Location: [facility] OR    COMBINED CYSTOSCOPY, RETROGRADES, URETEROSCOPY, INSERT STENT Left 10/23/2018    Procedure: Video cystoscopy, left ureteroscopy with stone extraction;  Surgeon: Bull Mast MD;  Location:  OR    CYSTOSCOPY, URETEROSCOPY, COMBINED Right 9/23/2015    Procedure: COMBINED CYSTOSCOPY, URETEROSCOPY;  Surgeon: ROME Jett MD;  Location: [facility] OR    ENT SURGERY      ESOPHAGOSCOPY, GASTROSCOPY, DUODENOSCOPY (EGD), COMBINED  4/8/2013    Procedure: COMBINED ESOPHAGOSCOPY, GASTROSCOPY, DUODENOSCOPY (EGD), BIOPSY SINGLE OR MULTIPLE;  Gastroscopy;  Surgeon: Peter Pickard MD;  Location: [facility] GI    ESOPHAGOSCOPY, GASTROSCOPY, DUODENOSCOPY (EGD), COMBINED Left 10/28/2014    Procedure: COMBINED ESOPHAGOSCOPY, GASTROSCOPY, DUODENOSCOPY (EGD), BIOPSY SINGLE OR MULTIPLE;  Surgeon: Narcisa Ramirez MD;  Location:  OR    ESOPHAGOSCOPY, GASTROSCOPY, DUODENOSCOPY (EGD), COMBINED N/A 12/24/2018    Procedure: COMBINED ESOPHAGOSCOPY, GASTROSCOPY, DUODENOSCOPY (EGD), BIOPSY SINGLE OR MULTIPLE;  Surgeon: Sonu Verduzco MD;  Location: [facility]    INJECT EPIDURAL TRANSFORAMINAL LUMBAR / SACRAL EA ADDITIONAL LEVEL Left 3/18/2021    Procedure: Left L5/S1 transforaminal injection for selective L5 nerve root block;  Surgeon: Eliza Pagan MD;  Location: [place] OR    LAPAROSCOPIC CHOLECYSTECTOMY  11/20/2014    [facility], Dr. Ramirez    LASER HOLMIUM LITHOTRIPSY URETER(S), INSERT STENT, COMBINED  4/2/2014    Procedure: COMBINED CYSTOSCOPY, URETEROSCOPY, LASER HOLMIUM LITHOTRIPSY URETER(S), INSERT STENT;  Cystoscopy,Left Ureteral Stent Removal,Left Ureteroscopy with Laser Lithotripsy,Left Ureter Stent Placement;  Surgeon: ROME Jett MD;  Location: [facility] OR    Transurethral stone resection  03/11/2011     amLODIPine (NORVASC) 5 MG  tablet  amphetamine-dextroamphetamine (ADDERALL XR) 15 MG 24 hr capsule  benzoyl peroxide 5 % external liquid  clindamycin (CLEOCIN T) 1 % external lotion  clonazePAM (KLONOPIN) 0.5 MG tablet  deutetrabenazine (AUSTEDO) 6 MG tablet  doxycycline hyclate (VIBRAMYCIN) 100 MG capsule  empagliflozin (JARDIANCE) 10 MG TABS tablet  fish oil-omega-3 fatty acids 1000 MG capsule  fluPHENAZine decanoate (PROLIXIN) 25 MG/ML injection  insulin glargine (LANTUS PEN) 100 UNIT/ML pen  lithium (ESKALITH CR/LITHOBID) 450 MG CR tablet  ondansetron (ZOFRAN ODT) 4 MG ODT tab  propranolol (INDERAL) 10 MG tablet  QUEtiapine (SEROQUEL) 200 MG tablet  QUEtiapine (SEROQUEL) 25 MG tablet  rosuvastatin (CRESTOR) 40 MG tablet  Semaglutide (RYBELSUS) 3 MG tablet  Semaglutide (RYBELSUS) 7 MG tablet  testosterone (ANDROGEL/TESTIM) 50 MG/5GM (1%) topical gel  ACE/ARB/ARNI NOT PRESCRIBED (INTENTIONAL)  blood glucose (NO BRAND SPECIFIED) test strip  Continuous Blood Gluc  (FREESTYLE COLTEN 2 READER) ZEINAB  Continuous Blood Gluc Sensor (FREESTYLE COLTEN 2 SENSOR) MISC  insulin pen needle (BD SAMANTHA U/F) 32G X 4 MM miscellaneous  nicotine (NICODERM CQ) 21 MG/24HR 24 hr patch  thin (NO BRAND SPECIFIED) lancets      Allergies   Allergen Reactions    Haldol [Haloperidol] Other (See Comments)     Makes patient very angry and anxious    Adhesive Tape Hives    Percocet [Oxycodone-Acetaminophen] Nausea and Vomiting    Prednisone Other (See Comments) and Hives     Suicidal ideation    Risperidone Other (See Comments)    Tramadol Hcl Nausea and Vomiting    Droperidol Anxiety    Seroquel [Quetiapine] Palpitations     Spent 2 weeks in the hospital due to having seroquel, caused palpitations and QT prolongation     Family History  Family History   Problem Relation Age of Onset    Hyperlipidemia Mother     Mental Illness Mother     Anxiety Disorder Mother     Anesthesia Reaction Mother         Vomiting/Nausea    Other Cancer Mother     Skin Cancer Mother      "Gastrointestinal Disease Father         Crohn's Disease    Cancer Father         Liver Cancer    Other Cancer Father         Liver    Obesity Father     Skin Cancer Father     No Known Problems Sister     Hypertension Brother     Other Cancer Brother         Esophagecial    Diabetes Brother     Obesity Brother     Hypertension Brother     Other Cancer Brother     Cancer Maternal Grandmother         lympoma    Diabetes Maternal Grandmother     Mental Illness Maternal Grandmother     Other Cancer Maternal Grandmother         Non Hodgkins Lymphoma    Diabetes Maternal Grandfather     Hypertension Maternal Grandfather     Prostate Cancer Maternal Grandfather     Hyperlipidemia Maternal Grandfather     Heart Disease Paternal Grandfather         heart disease    Other Cancer Paternal Half-Brother      Social History   Social History     Tobacco Use    Smoking status: Former     Packs/day: 0.50     Years: 5.00     Additional pack years: 0.00     Total pack years: 2.50     Types: Other, Cigarettes     Start date: 2011     Quit date: 2022     Years since quittin.5     Passive exposure: Never    Smokeless tobacco: Former     Types: Chew     Quit date: 2019    Tobacco comments:     Just nicotine gum currently. 23   Vaping Use    Vaping Use: Former    Substances: Nicotine, Flavoring    Devices: [employer] tank   Substance Use Topics    Alcohol use: No    Drug use: Not Currently     Types: Marijuana, Opiates     Comment: denies using oxy or other opiates_\"not for years\"         A medically appropriate review of systems was performed with pertinent positives and negatives noted in the HPI, and all other systems negative.    Physical Exam   BP: (!) 153/103  Pulse: 104  Temp: 99  F (37.2  C)  Resp: 20  Height: 167.6 cm (5' 6\")  Weight: 104.3 kg (230 lb)  SpO2: 95 %  Physical Exam  Constitutional:       General: He is not in acute distress.     Appearance: Normal appearance. He is not diaphoretic.   HENT:      " Head: Atraumatic.      Mouth/Throat:      Mouth: Mucous membranes are moist.   Eyes:      General: No scleral icterus.     Conjunctiva/sclera: Conjunctivae normal.   Cardiovascular:      Rate and Rhythm: Normal rate.      Heart sounds: Normal heart sounds.   Pulmonary:      Effort: No respiratory distress.      Breath sounds: Normal breath sounds.   Abdominal:      General: Abdomen is flat.   Musculoskeletal:      Cervical back: Neck supple.   Skin:     General: Skin is warm.      Findings: No rash.   Neurological:      General: No focal deficit present.      Mental Status: He is alert and oriented to person, place, and time.      Sensory: No sensory deficit.      Motor: No weakness.      Coordination: Coordination normal.   Psychiatric:         Mood and Affect: Mood is anxious.         Speech: Speech normal.         Behavior: Behavior is agitated.         Thought Content: Thought content is paranoid. Thought content includes homicidal ideation.         ED Course, Procedures, & Data      Procedures               Medications - No data to display  Labs Ordered and Resulted from Time of ED Arrival to Time of ED Departure - No data to display  No orders to display          Critical care was not performed.     Medical Decision Making  The patient's presentation was of moderate complexity (a chronic illness mild to moderate exacerbation, progression, or side effect of treatment).    The patient's evaluation involved:  an assessment requiring an independent historian (group home staff will be contacted for further interview independent historian)  discussion of management or test interpretation with another health professional (independent professional will do full DEC assessment and discuss need for hospitalization versus outpatient management.)    The patient's management necessitated high risk (a decision regarding hospitalization).    Assessment & Plan        I have reviewed the nursing notes. I have reviewed the  findings, diagnosis, plan and need for follow up with the patient.    Patient awaiting full DEC assessment at this time making statements about her housemates will have full evaluation and determine need for hospitalization versus outpatient management.    Final diagnoses:   Borderline personality disorder (H)   Moderate episode of recurrent major depressive disorder (H)   Aggressive behavior       Hcetor Mendoza MD  [facility] EMERGENCY DEPARTMENT  3/25/2024     Hector Mendoza MD  03/26/24 0016
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
## 2024-03-26 NOTE — ED NOTES
IP MH Referral Acuity Rating Score (RARS)    LMHP complete at referral to IP MH, with DEC; and, daily while awaiting IP MH placement. Call score to PPS.  CRITERIA SCORING   New 72 HH and Involuntary for IP MH (not adolescent) 0/1   Boarding over 24 hours 0/1   Vulnerable adult at least 55+ with multiple co morbidities; or, Patient age 11 or under 0/1   Suicide ideation without relief of precipitating factors 0/1   Current plan for suicide 0/1   Current plan for homicide 1/1   Imminent risk or actual attempt to seriously harm another without relief of factors precipitating the attempt 1/1   Severe dysfunction in daily living (ex: complete neglect for self care, extreme disruption in vegetative function, extreme deterioration in social interactions) 1/1   Recent (last 2 weeks) or current physical aggression in the ED 0/1   Restraints or seclusion episode in ED 0/1   Verbal aggression, agitation, yelling, etc., while in the ED 0/1   Active psychosis with psychomotor agitation or catatonia 1/1   Need for constant or near constant redirection (from leaving, from others, etc).  0/1   Intrusive or disruptive behaviors 0/1   TOTAL Acuity Total Score: 4
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
## 2024-03-27 ENCOUNTER — TELEPHONE (OUTPATIENT)
Dept: BEHAVIORAL HEALTH | Facility: CLINIC | Age: 34
End: 2024-03-27
Payer: MEDICARE

## 2024-03-27 PROBLEM — R46.89 AGGRESSIVE BEHAVIOR: Status: ACTIVE | Noted: 2020-12-13

## 2024-03-27 PROBLEM — F33.1 MODERATE EPISODE OF RECURRENT MAJOR DEPRESSIVE DISORDER (H): Status: ACTIVE | Noted: 2017-01-17

## 2024-03-27 LAB
ALBUMIN SERPL BCG-MCNC: 4.6 G/DL (ref 3.5–5.2)
ALBUMIN UR-MCNC: NEGATIVE MG/DL
ALP SERPL-CCNC: 78 U/L (ref 40–150)
ALT SERPL W P-5'-P-CCNC: 83 U/L (ref 0–70)
AMPHETAMINES UR QL SCN: ABNORMAL
ANION GAP SERPL CALCULATED.3IONS-SCNC: 11 MMOL/L (ref 7–15)
APPEARANCE UR: CLEAR
AST SERPL W P-5'-P-CCNC: 77 U/L (ref 0–45)
BACTERIA #/AREA URNS HPF: ABNORMAL /HPF
BARBITURATES UR QL SCN: ABNORMAL
BASOPHILS # BLD AUTO: 0.1 10E3/UL (ref 0–0.2)
BASOPHILS NFR BLD AUTO: 1 %
BENZODIAZ UR QL SCN: ABNORMAL
BILIRUB SERPL-MCNC: 0.5 MG/DL
BILIRUB UR QL STRIP: NEGATIVE
BUN SERPL-MCNC: 11.9 MG/DL (ref 6–20)
BZE UR QL SCN: ABNORMAL
CALCIUM SERPL-MCNC: 9.9 MG/DL (ref 8.6–10)
CANNABINOIDS UR QL SCN: ABNORMAL
CHLORIDE SERPL-SCNC: 105 MMOL/L (ref 98–107)
COLOR UR AUTO: ABNORMAL
CREAT SERPL-MCNC: 0.65 MG/DL (ref 0.51–1.17)
DEPRECATED HCO3 PLAS-SCNC: 23 MMOL/L (ref 22–29)
EGFRCR SERPLBLD CKD-EPI 2021: >90 ML/MIN/1.73M2
EOSINOPHIL # BLD AUTO: 0.2 10E3/UL (ref 0–0.7)
EOSINOPHIL NFR BLD AUTO: 2 %
ERYTHROCYTE [DISTWIDTH] IN BLOOD BY AUTOMATED COUNT: 14.7 % (ref 10–15)
FENTANYL UR QL: ABNORMAL
GLUCOSE BLDC GLUCOMTR-MCNC: 144 MG/DL (ref 70–99)
GLUCOSE SERPL-MCNC: 133 MG/DL (ref 70–99)
GLUCOSE UR STRIP-MCNC: >=1000 MG/DL
HCT VFR BLD AUTO: 46.7 % (ref 35–53)
HGB BLD-MCNC: 15.5 G/DL (ref 13.3–17.7)
HGB UR QL STRIP: NEGATIVE
IMM GRANULOCYTES # BLD: 0 10E3/UL
IMM GRANULOCYTES NFR BLD: 0 %
KETONES UR STRIP-MCNC: 10 MG/DL
LEUKOCYTE ESTERASE UR QL STRIP: ABNORMAL
LYMPHOCYTES # BLD AUTO: 2.8 10E3/UL (ref 0.8–5.3)
LYMPHOCYTES NFR BLD AUTO: 23 %
MCH RBC QN AUTO: 27.7 PG (ref 26.5–33)
MCHC RBC AUTO-ENTMCNC: 33.2 G/DL (ref 31.5–36.5)
MCV RBC AUTO: 84 FL (ref 78–100)
MONOCYTES # BLD AUTO: 0.6 10E3/UL (ref 0–1.3)
MONOCYTES NFR BLD AUTO: 5 %
NEUTROPHILS # BLD AUTO: 8.3 10E3/UL (ref 1.6–8.3)
NEUTROPHILS NFR BLD AUTO: 69 %
NITRATE UR QL: NEGATIVE
NRBC # BLD AUTO: 0 10E3/UL
NRBC BLD AUTO-RTO: 0 /100
OPIATES UR QL SCN: ABNORMAL
PCP QUAL URINE (ROCHE): ABNORMAL
PH UR STRIP: 7 [PH] (ref 5–7)
PLATELET # BLD AUTO: 306 10E3/UL (ref 150–450)
POTASSIUM SERPL-SCNC: 4.2 MMOL/L (ref 3.4–5.3)
PROT SERPL-MCNC: 7.6 G/DL (ref 6.4–8.3)
RBC # BLD AUTO: 5.59 10E6/UL (ref 3.8–5.9)
RBC URINE: 4 /HPF
SODIUM SERPL-SCNC: 139 MMOL/L (ref 135–145)
SP GR UR STRIP: 1.02 (ref 1–1.03)
SQUAMOUS EPITHELIAL: 13 /HPF
TRANSITIONAL EPI: <1 /HPF
TSH SERPL DL<=0.005 MIU/L-ACNC: 1.32 UIU/ML (ref 0.3–4.2)
UROBILINOGEN UR STRIP-MCNC: NORMAL MG/DL
WBC # BLD AUTO: 12.1 10E3/UL (ref 4–11)
WBC URINE: 23 /HPF

## 2024-03-27 PROCEDURE — 85025 COMPLETE CBC W/AUTO DIFF WBC: CPT

## 2024-03-27 PROCEDURE — 250N000013 HC RX MED GY IP 250 OP 250 PS 637

## 2024-03-27 PROCEDURE — 124N000002 HC R&B MH UMMC

## 2024-03-27 PROCEDURE — 84443 ASSAY THYROID STIM HORMONE: CPT

## 2024-03-27 PROCEDURE — 250N000011 HC RX IP 250 OP 636

## 2024-03-27 PROCEDURE — 96372 THER/PROPH/DIAG INJ SC/IM: CPT

## 2024-03-27 PROCEDURE — 250N000013 HC RX MED GY IP 250 OP 250 PS 637: Performed by: PSYCHIATRY & NEUROLOGY

## 2024-03-27 PROCEDURE — 82962 GLUCOSE BLOOD TEST: CPT

## 2024-03-27 PROCEDURE — A9270 NON-COVERED ITEM OR SERVICE: HCPCS | Performed by: EMERGENCY MEDICINE

## 2024-03-27 PROCEDURE — 99223 1ST HOSP IP/OBS HIGH 75: CPT

## 2024-03-27 PROCEDURE — 87086 URINE CULTURE/COLONY COUNT: CPT | Performed by: EMERGENCY MEDICINE

## 2024-03-27 PROCEDURE — 250N000013 HC RX MED GY IP 250 OP 250 PS 637: Performed by: STUDENT IN AN ORGANIZED HEALTH CARE EDUCATION/TRAINING PROGRAM

## 2024-03-27 PROCEDURE — 250N000013 HC RX MED GY IP 250 OP 250 PS 637: Performed by: EMERGENCY MEDICINE

## 2024-03-27 PROCEDURE — 250N000013 HC RX MED GY IP 250 OP 250 PS 637: Performed by: NURSE PRACTITIONER

## 2024-03-27 PROCEDURE — A9270 NON-COVERED ITEM OR SERVICE: HCPCS | Performed by: NURSE PRACTITIONER

## 2024-03-27 PROCEDURE — 81001 URINALYSIS AUTO W/SCOPE: CPT | Performed by: EMERGENCY MEDICINE

## 2024-03-27 PROCEDURE — 80053 COMPREHEN METABOLIC PANEL: CPT

## 2024-03-27 PROCEDURE — 80307 DRUG TEST PRSMV CHEM ANLYZR: CPT | Performed by: EMERGENCY MEDICINE

## 2024-03-27 PROCEDURE — 250N000011 HC RX IP 250 OP 636: Performed by: PSYCHIATRY & NEUROLOGY

## 2024-03-27 PROCEDURE — 250N000012 HC RX MED GY IP 250 OP 636 PS 637: Performed by: PSYCHIATRY & NEUROLOGY

## 2024-03-27 PROCEDURE — 36415 COLL VENOUS BLD VENIPUNCTURE: CPT

## 2024-03-27 PROCEDURE — 99418 PROLNG IP/OBS E/M EA 15 MIN: CPT

## 2024-03-27 RX ORDER — HYDROXYZINE HYDROCHLORIDE 25 MG/1
25 TABLET, FILM COATED ORAL EVERY 4 HOURS PRN
Status: DISCONTINUED | OUTPATIENT
Start: 2024-03-27 | End: 2024-04-12 | Stop reason: HOSPADM

## 2024-03-27 RX ORDER — TRAZODONE HYDROCHLORIDE 50 MG/1
50 TABLET, FILM COATED ORAL
Status: DISCONTINUED | OUTPATIENT
Start: 2024-03-27 | End: 2024-04-12 | Stop reason: HOSPADM

## 2024-03-27 RX ORDER — VIT C/B6/B5/MAGNESIUM/HERB 173 50-5-6-5MG
1 CAPSULE ORAL DAILY
COMMUNITY
End: 2024-08-07

## 2024-03-27 RX ORDER — TESTOSTERONE GEL, 1% 10 MG/G
50 GEL TRANSDERMAL DAILY
Status: DISCONTINUED | OUTPATIENT
Start: 2024-03-27 | End: 2024-03-27

## 2024-03-27 RX ORDER — AMOXICILLIN 250 MG
1 CAPSULE ORAL 2 TIMES DAILY PRN
Status: DISCONTINUED | OUTPATIENT
Start: 2024-03-27 | End: 2024-04-12 | Stop reason: HOSPADM

## 2024-03-27 RX ORDER — FLUPHENAZINE DECANOATE 25 MG/ML
25 INJECTION, SOLUTION INTRAMUSCULAR; SUBCUTANEOUS
Status: DISCONTINUED | OUTPATIENT
Start: 2024-03-27 | End: 2024-04-12 | Stop reason: HOSPADM

## 2024-03-27 RX ORDER — DOXYCYCLINE 100 MG/1
100 CAPSULE ORAL EVERY 12 HOURS SCHEDULED
Status: DISCONTINUED | OUTPATIENT
Start: 2024-03-27 | End: 2024-03-27

## 2024-03-27 RX ORDER — AMLODIPINE BESYLATE 5 MG/1
10 TABLET ORAL DAILY
Status: DISCONTINUED | OUTPATIENT
Start: 2024-03-27 | End: 2024-03-27

## 2024-03-27 RX ORDER — LITHIUM CARBONATE 450 MG
900 TABLET, EXTENDED RELEASE ORAL AT BEDTIME
Status: DISCONTINUED | OUTPATIENT
Start: 2024-03-27 | End: 2024-03-27

## 2024-03-27 RX ORDER — MAGNESIUM HYDROXIDE/ALUMINUM HYDROXICE/SIMETHICONE 120; 1200; 1200 MG/30ML; MG/30ML; MG/30ML
30 SUSPENSION ORAL EVERY 4 HOURS PRN
Status: DISCONTINUED | OUTPATIENT
Start: 2024-03-27 | End: 2024-04-12 | Stop reason: HOSPADM

## 2024-03-27 RX ORDER — OLANZAPINE 10 MG/2ML
10 INJECTION, POWDER, FOR SOLUTION INTRAMUSCULAR 3 TIMES DAILY PRN
Status: DISCONTINUED | OUTPATIENT
Start: 2024-03-27 | End: 2024-04-09

## 2024-03-27 RX ORDER — MULTIVITAMIN WITH IRON
500 TABLET ORAL DAILY
Status: DISCONTINUED | OUTPATIENT
Start: 2024-03-27 | End: 2024-03-27

## 2024-03-27 RX ORDER — CLINDAMYCIN PHOSPHATE 10 UG/ML
LOTION TOPICAL 2 TIMES DAILY
Status: DISCONTINUED | OUTPATIENT
Start: 2024-03-27 | End: 2024-04-12 | Stop reason: HOSPADM

## 2024-03-27 RX ORDER — ONDANSETRON 4 MG/1
4 TABLET, FILM COATED ORAL EVERY 6 HOURS PRN
Status: DISCONTINUED | OUTPATIENT
Start: 2024-03-27 | End: 2024-04-12 | Stop reason: HOSPADM

## 2024-03-27 RX ORDER — TESTOSTERONE GEL, 1% 10 MG/G
50 GEL TRANSDERMAL DAILY
Status: DISCONTINUED | OUTPATIENT
Start: 2024-03-27 | End: 2024-04-12 | Stop reason: HOSPADM

## 2024-03-27 RX ORDER — DEXTROAMPHETAMINE SACCHARATE, AMPHETAMINE ASPARTATE MONOHYDRATE, DEXTROAMPHETAMINE SULFATE AND AMPHETAMINE SULFATE 3.75; 3.75; 3.75; 3.75 MG/1; MG/1; MG/1; MG/1
15 CAPSULE, EXTENDED RELEASE ORAL EVERY MORNING
Status: DISCONTINUED | OUTPATIENT
Start: 2024-03-28 | End: 2024-03-27

## 2024-03-27 RX ORDER — ROSUVASTATIN CALCIUM 10 MG/1
40 TABLET, COATED ORAL DAILY
Status: DISCONTINUED | OUTPATIENT
Start: 2024-03-27 | End: 2024-03-27

## 2024-03-27 RX ORDER — OLANZAPINE 10 MG/1
10 TABLET ORAL 3 TIMES DAILY PRN
Status: DISCONTINUED | OUTPATIENT
Start: 2024-03-27 | End: 2024-04-09

## 2024-03-27 RX ORDER — FLUPHENAZINE DECANOATE 25 MG/ML
25 INJECTION, SOLUTION INTRAMUSCULAR; SUBCUTANEOUS ONCE
Status: DISCONTINUED | OUTPATIENT
Start: 2024-04-10 | End: 2024-03-27

## 2024-03-27 RX ORDER — CLONAZEPAM 0.5 MG/1
0.25 TABLET ORAL DAILY PRN
Status: DISCONTINUED | OUTPATIENT
Start: 2024-03-27 | End: 2024-03-28

## 2024-03-27 RX ORDER — ZINC SULFATE 50(220)MG
220 CAPSULE ORAL DAILY
Status: DISCONTINUED | OUTPATIENT
Start: 2024-03-28 | End: 2024-04-12 | Stop reason: HOSPADM

## 2024-03-27 RX ORDER — PROPRANOLOL HYDROCHLORIDE 10 MG/1
10 TABLET ORAL 3 TIMES DAILY
Status: DISCONTINUED | OUTPATIENT
Start: 2024-03-27 | End: 2024-03-27

## 2024-03-27 RX ORDER — NICOTINE 21 MG/24HR
1 PATCH, TRANSDERMAL 24 HOURS TRANSDERMAL EVERY 24 HOURS
Status: DISCONTINUED | OUTPATIENT
Start: 2024-03-27 | End: 2024-04-12 | Stop reason: HOSPADM

## 2024-03-27 RX ORDER — DEXTROAMPHETAMINE SACCHARATE, AMPHETAMINE ASPARTATE MONOHYDRATE, DEXTROAMPHETAMINE SULFATE AND AMPHETAMINE SULFATE 3.75; 3.75; 3.75; 3.75 MG/1; MG/1; MG/1; MG/1
15 CAPSULE, EXTENDED RELEASE ORAL DAILY
Status: DISCONTINUED | OUTPATIENT
Start: 2024-03-27 | End: 2024-03-28

## 2024-03-27 RX ORDER — ACETAMINOPHEN 325 MG/1
650 TABLET ORAL EVERY 4 HOURS PRN
Status: DISCONTINUED | OUTPATIENT
Start: 2024-03-27 | End: 2024-04-01

## 2024-03-27 RX ORDER — MULTIVIT,TX WITH IRON,MINERALS
500 TABLET, EXTENDED RELEASE ORAL DAILY
Status: DISCONTINUED | OUTPATIENT
Start: 2024-03-27 | End: 2024-04-12 | Stop reason: HOSPADM

## 2024-03-27 RX ORDER — DEXTROAMPHETAMINE SACCHARATE, AMPHETAMINE ASPARTATE MONOHYDRATE, DEXTROAMPHETAMINE SULFATE AND AMPHETAMINE SULFATE 3.75; 3.75; 3.75; 3.75 MG/1; MG/1; MG/1; MG/1
15 CAPSULE, EXTENDED RELEASE ORAL DAILY
Qty: 30 CAPSULE | OUTPATIENT
Start: 2024-03-27

## 2024-03-27 RX ORDER — CHLORAL HYDRATE 500 MG
1 CAPSULE ORAL DAILY
Status: DISCONTINUED | OUTPATIENT
Start: 2024-03-28 | End: 2024-04-12 | Stop reason: HOSPADM

## 2024-03-27 RX ORDER — MULTIVITAMIN WITH IRON
2 TABLET ORAL DAILY
Status: ON HOLD | COMMUNITY
End: 2024-04-12

## 2024-03-27 RX ADMIN — INSULIN GLARGINE 25 UNITS: 100 INJECTION, SOLUTION SUBCUTANEOUS at 10:11

## 2024-03-27 RX ADMIN — DOXYCYCLINE HYCLATE 100 MG: 100 CAPSULE ORAL at 08:29

## 2024-03-27 RX ADMIN — NICOTINE 1 PATCH: 21 PATCH, EXTENDED RELEASE TRANSDERMAL at 18:27

## 2024-03-27 RX ADMIN — DOXYCYCLINE HYCLATE 100 MG: 100 CAPSULE ORAL at 19:56

## 2024-03-27 RX ADMIN — NICOTINE POLACRILEX 4 MG: 4 GUM, CHEWING BUCCAL at 07:50

## 2024-03-27 RX ADMIN — BENZOYL PEROXIDE: 50 LOTION TOPICAL at 18:29

## 2024-03-27 RX ADMIN — CLONAZEPAM 0.25 MG: 0.5 TABLET ORAL at 12:31

## 2024-03-27 RX ADMIN — QUETIAPINE FUMARATE 200 MG: 200 TABLET ORAL at 19:56

## 2024-03-27 RX ADMIN — FLUPHENAZINE DECANOATE 25 MG: 25 INJECTION, SOLUTION INTRAMUSCULAR; SUBCUTANEOUS at 12:23

## 2024-03-27 RX ADMIN — ONDANSETRON 4 MG: 4 TABLET, ORALLY DISINTEGRATING ORAL at 16:54

## 2024-03-27 RX ADMIN — PROPRANOLOL HYDROCHLORIDE 10 MG: 10 TABLET ORAL at 07:51

## 2024-03-27 RX ADMIN — NICOTINE POLACRILEX 4 MG: 4 GUM, CHEWING BUCCAL at 21:36

## 2024-03-27 RX ADMIN — NICOTINE POLACRILEX 4 MG: 4 GUM, CHEWING BUCCAL at 11:27

## 2024-03-27 RX ADMIN — NICOTINE POLACRILEX 4 MG: 4 GUM, CHEWING BUCCAL at 09:24

## 2024-03-27 RX ADMIN — CLINDAMYCIN PHOSPHATE 1 APPLICATOR: 10 LOTION TOPICAL at 20:02

## 2024-03-27 RX ADMIN — NICOTINE POLACRILEX 4 MG: 4 GUM, CHEWING BUCCAL at 19:52

## 2024-03-27 RX ADMIN — Medication 500 MG: at 18:28

## 2024-03-27 RX ADMIN — AMLODIPINE BESYLATE 10 MG: 10 TABLET ORAL at 08:27

## 2024-03-27 RX ADMIN — PROPRANOLOL HYDROCHLORIDE 10 MG: 10 TABLET ORAL at 19:53

## 2024-03-27 RX ADMIN — ROSUVASTATIN 40 MG: 20 TABLET, FILM COATED ORAL at 08:28

## 2024-03-27 RX ADMIN — LITHIUM CARBONATE 900 MG: 450 TABLET, EXTENDED RELEASE ORAL at 19:53

## 2024-03-27 RX ADMIN — DEXTROAMPHETAMINE SULFATE, DEXTROAMPHETAMINE SACCHARATE, AMPHETAMINE SULFATE AND AMPHETAMINE ASPARTATE 15 MG: 3.75; 3.75; 3.75; 3.75 CAPSULE, EXTENDED RELEASE ORAL at 11:27

## 2024-03-27 RX ADMIN — NICOTINE POLACRILEX 4 MG: 4 GUM, CHEWING BUCCAL at 15:21

## 2024-03-27 RX ADMIN — ONDANSETRON 4 MG: 4 TABLET, ORALLY DISINTEGRATING ORAL at 07:52

## 2024-03-27 RX ADMIN — HYDROXYZINE HYDROCHLORIDE 25 MG: 25 TABLET, FILM COATED ORAL at 18:28

## 2024-03-27 RX ADMIN — NICOTINE POLACRILEX 4 MG: 4 GUM, CHEWING BUCCAL at 16:19

## 2024-03-27 RX ADMIN — EMPAGLIFLOZIN 10 MG: 10 TABLET, FILM COATED ORAL at 08:29

## 2024-03-27 RX ADMIN — OLANZAPINE 10 MG: 10 TABLET, FILM COATED ORAL at 18:28

## 2024-03-27 RX ADMIN — PROPRANOLOL HYDROCHLORIDE 10 MG: 10 TABLET ORAL at 13:15

## 2024-03-27 RX ADMIN — NICOTINE POLACRILEX 4 MG: 4 GUM, CHEWING BUCCAL at 13:15

## 2024-03-27 RX ADMIN — TESTOSTERONE 50 MG OF TESTOSTERONE: 50 GEL TRANSDERMAL at 16:51

## 2024-03-27 ASSESSMENT — ACTIVITIES OF DAILY LIVING (ADL)
ADLS_ACUITY_SCORE: 39
ADLS_ACUITY_SCORE: 39
ADLS_ACUITY_SCORE: 54
ADLS_ACUITY_SCORE: 39
ADLS_ACUITY_SCORE: 54
ADLS_ACUITY_SCORE: 39
LAUNDRY: WITH SUPERVISION
ADLS_ACUITY_SCORE: 39
ORAL_HYGIENE: INDEPENDENT
ADLS_ACUITY_SCORE: 39
ADLS_ACUITY_SCORE: 54
ADLS_ACUITY_SCORE: 39
ADLS_ACUITY_SCORE: 39
ADLS_ACUITY_SCORE: 54
ADLS_ACUITY_SCORE: 39
ADLS_ACUITY_SCORE: 54
ADLS_ACUITY_SCORE: 54
DRESS: INDEPENDENT
HYGIENE/GROOMING: INDEPENDENT
ADLS_ACUITY_SCORE: 39
ADLS_ACUITY_SCORE: 39

## 2024-03-27 ASSESSMENT — LIFESTYLE VARIABLES: SKIP TO QUESTIONS 9-10: 1

## 2024-03-27 NOTE — TELEPHONE ENCOUNTER
R:  [place]  Access Inpatient Bed Call Log 3/27/2024 7:25 AM   Intake has called facilities that have not updated their bed status within the last 12 hours.   ([facility] Only)  ADULTS:   [facility] is posting 0 beds.                 2:04 PM Paged Dr. Kruger.  2:57 PM Patient accepted to unit 30/[place].  3:01 PM Unit [facility] CRN unavailable. [facility] informed of patient in queue. Writer will follow up with unit 30 RN.  3:02 PM Indicia completed.  3:04 PM [facility] ED RN Peggy notified.      Pt remains on work list pending appropriate bed availability.
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
## 2024-03-27 NOTE — PROGRESS NOTES
"Triage & Transition Services, Extended Care     Therapy Progress Note    Patient: Prakash goes by \"Prakash,\" uses he/him pronouns  Date of Service: March 27, 2024  Site of Service: [facility] EMERGENCY DEPARTMENT                             [id number]  Patient was seen yes  Mode of Assessment: Virtual: [employer]Well    Presentation Summary: Pt is seen by extended care for therapeutic check-in and reassessment. Pt presents as calm, cooperative, alert, oriented x3. Eye contact is poor. Pt is disheveld with flat affect. Pt denies having suicidal ideation. Pt endorses hearing voices that tell him to harm his roommate where he resides at staff member at the group home. He reports the voices today to be \"loud\". He states that listening to music helps him to distract from the voices. He was listening to music during this interview. He rates the severity of these voices to be at 9/10. He is agreeable to continue with IP plan. He was able to engage in discussion about coping skills and supports.    Therapeutic Intervention(s) Provided: Engaged in cognitive restructuring/ reframing, looked at common cognitive distortions and challenged negative thoughts., Engaged in guided discovery, explored patient's perspectives and helped expand them through socratic dialogue.    Current Symptoms: anxious, obsessions/compulsions, excessive worry difficulty concentrating, sense of doom, decreased libido, helplessness, hoplessness   inattentive, distractability, auditory hallucinations      Mental Status Exam   Affect: Flat  Appearance: Appropriate  Attention Span/Concentration: Inattentive  Eye Contact: Engaged    Fund of Knowledge: Appropriate   Language /Speech Content: Fluent  Language /Speech Volume: Normal  Language /Speech Rate/Productions: Minimally Responsive  Recent Memory: Variable  Remote Memory: Variable  Mood: Anxious  Orientation to Person: Yes   Orientation to Place: Yes  Orientation to Time of Day: Yes  Orientation to Date: " Yes, No     Situation (Do they understand why they are here?): Yes  Psychomotor Behavior: Normal  Thought Content: Hallucinations, Paranoia, Homicidal  Thought Form: Paranoia, Obsessive/Perseverative    Treatment Objective(s) Addressed: rapport building, orienting the patient to therapy, identifying and practicing coping strategies, assessing safety    Patient Response to Interventions: acceptance expressed, verbalizes understanding    Progress Towards Goals: Patient Reports Symptoms Are: ongoing  Patient Progress Toward Goals: is not making progress  Comment: Pt could benefit from higher level of service.  Next Step to Work Toward Discharge: symptom stabilization, collaboration with OP team/family/friends  Symptom Stabilization Comment: Patient presents with ongoing auditory command hallucination. He is unable to assess safety or get patient to engage in safety planning.  Collaboration Comment: Pt unable to engage in safety planning. Pt has been accepted for inpatient at [facility].    Case Management: Case Management Included: collaborating with patient's support system  Details on Collaborating with Patient's Support System: Attempted to contact and left a message for nursing staff Domenica at [facility] to ask them to bring medications Semaglutide (RYBELSUS) 7mg ,  deutetrabenazine (AUSTEDO) 6 MG  tablet  Summary of Interaction: No one has returned phone call    Plan: inpatient mental health  yes provider, RN spoke with nursing and provider  no    Clinical Substantiation: Pt presented to the ER a few days ago with auditory command hallucinations that tell him to kill a staff and resident where he resides. He continues to experience MH distress.After therapeutic assessment, intervention and aftercare planning by ED care team and LMHP and in consultation with attending provider, the patient's circumstances and mental state were appropriate for inpatient behavioral health. Patient is recommended by this  clinician to admit IP MH for safety and stabilization.    Legal Status: Legal Status at Admission: Voluntary/Patient has signed consent for treatment    Session Status: Time session started: 1415  Time session ended: 1432    Session Duration (minutes): 17 minutes  Session Number: 2  Anticipated number of sessions or this episode of care: 4    Time Spent: 17 minutes    CPT Code: CPT Codes: 55032 - Psychotherapy (with patient) - 30 (16-37*) min    Diagnosis:   Patient Active Problem List   Diagnosis Code    ADHD (attention deficit hyperactivity disorder) F90.9    Bipolar 1 disorder, manic, mild F31.11    Marijuana abuse F12.10    Polysubstance abuse (H) F19.10    GERD (gastroesophageal reflux disease) K21.9    Tobacco abuse Z72.0    Intractable back pain M54.9    Optic neuritis H46.9    Cauda equina syndrome with neurogenic bladder (H) G83.4    Schizoaffective disorder, bipolar type (H) F25.0    PTSD (post-traumatic stress disorder) F43.10    Anxiety F41.9    Auditory hallucination R44.0    Nephrolithiasis N20.0    Cyst of left ovary N83.202    Borderline personality disorder (H) F60.3    Cannabis use disorder, severe, dependence (H) F12.20    Depression F32.A    Episodic mood disorder (H24) F39    History of heroin abuse (H) F11.11    Moderate episode of recurrent major depressive disorder (H) F33.1    Opioid use disorder, severe, dependence (H) F11.20    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    Nausea R11.0    Urinary retention R33.9    Chronic bilateral low back pain without sciatica M54.50, G89.29    AVA (generalized anxiety disorder) F41.1    Aggressive behavior R46.89    Gender identity disorder F64.9    Lumbosacral radiculopathy at L5 M54.17    DUB (dysfunctional uterine bleeding) N93.8    Seizure-like activity (H) R56.9    Acanthosis nigricans L83    Schizoaffective disorder, chronic condition with acute exacerbation (H) F25.9    Bipolar affective disorder, mixed, severe, with  psychotic behavior (H) F31.64    Schizophrenia, schizoaffective, chronic with acute exacerbation (H) F25.9    Akathisia G25.71    Hypertension, unspecified type I10    Female-to-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 person Z78.9    Morbid obesity (H) E66.01    Diabetes mellitus, type 2 (H) E11.9    Mood disorder due to a general medical condition F06.30    Pain of right hand M79.641    Acne vulgaris L70.0       Primary Problem This Admission: Active Hospital Problems    *Schizoaffective disorder, bipolar type (H)        Carlin Luna, [facility]   Licensed Mental Health Professional (LMHP), [facility]  [phone]
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
## 2024-03-27 NOTE — PLAN OF CARE
03/27/24 1802   Patient Belongings   Patient Belongings locker;remains with patient   Patient Belongings Remaining with Patient jewelry  (glasses, bra, boxer, bracelette)   Patient Belongings Put in Hospital Secure Location (Security or Locker, etc.) shoes;tote;wallet;clothing;cell phone/electronics;cash/credit card   Belongings Search Yes   Clothing Search Yes   Second Staff Tio beasleyag: black/white switch, colored pens, 4 books and 1 coloring book, airpods with a puppy case, black iphone, pink nike shirt, nike shorts, nike slides    Blue/grey/black backpack: blue underwear, purple shirt, black , solaray , orange wallet, lord of the rings card game, grey sweatpants, blue polka dot underwear, 3 nicotine vapes    A               Admission:  I am responsible for any personal items that are not sent to the safe or pharmacy.  [facility] is not responsible for loss, theft or damage of any property in my possession.    Signature:  _________________________________ Date: _______  Time: _____                                              Staff Signature:  ____________________________ Date: ________  Time: _____      2nd Staff person, if patient is unable/unwilling to sign:    Signature: ________________________________ Date: ________  Time: _____     Discharge:  [facility] has returned all of my personal belongings:    Signature: _________________________________ Date: ________  Time: _____                                          Staff Signature:  ____________________________ Date: ________  Time: _____

## 2024-03-27 NOTE — ED NOTES
Pt stated he wanted to discharge. Dr. Dominguez visited and placed on 72 hour hold. Pt had no objection. Pt. Right's given to pt. Dr. Kruger is receiving MD. Report called to receiving nurse.  Informed  receiving nurse that Austedo and Rybelsus is non- formulary so pt attempting to contact group home to bring these home meds. Has not reached them at this time. 1700 . Pt states he feels bad and requested another Zofran . This was given and [facility] was updated on pt.'s current status. Transport requested. 1730 pt transported to station 30 via [facility] w/ security and belongings returned. Insulin pen tubed to [facility].

## 2024-03-27 NOTE — ED PROVIDER NOTES
[facility] who [facility], that was signed out to me by the night physician in the main emergency department at shift change.  The patient has been evaluated by mental health, completed a DEC assessment, and deemed in need of admission pending stabilization or other change in status.  [facility] remains in the [facility] while awaiting inpatient bed placement.  The patient has been medically cleared, and home medications are being administered.  Any necessary psychiatric consultation has been ordered.  I will be available to manage this patient for any acute issues that should arise until the provider in the [facility] arrives to assume care today, at which time they will assume care of the patient.  I will continue to be available and address any pending mental health recommendations or medical issues, that should arise either by nursing, the psychiatric consultant, or by the behavioral extended care team, until the time of that signout.       Mark Aponte MD  03/27/24 2617

## 2024-03-27 NOTE — PROGRESS NOTES
"Admission Note:    Patient admitted to [facility] at approximately 1730 from [facility] Ed where patient was seen for Hallucinations and Overdose. Upon arrival to the unit patient presents as Depressed, Anxious, and verbalizing Homicidal Command Auditory Hallucinations towards \"Someone at home.\" Affect presents as flat/sad. Mood presents as depressed, anxious, and sad. Patient is paranoid, guarded, suspicious, and mistrustful; yet is calm, polite, and cooperative. Thought content presents as homicidal r/t auditory command hallucinations. Thought process presents as relevant. Speech presents as clear and coherent. Patient endorses depression, but did not rate. Rates anxiety at a #6/10. Requested and received PRN Hydroxyzine x1 and PRN Zyprexa x1 for anxiety. Denies suicidal ideation and SIB. Endorses homicidal ideation d/t auditory command hallucinations. Patient denies visual hallucinations. Endorses homicidal auditory command hallucinations with voices saying to harm a \"person at home.\" Was cooperative with the admission process.     Medical: Patient has a history of ADHD, Bipolar 1 Disorder, Borderline Personality Disorder, chronic low back pain, Depression, Diabetes Mellitus type 2, GERD, history of TBI, Hypertension, Polysubstance use disorder, and PTSD. Patient is a Full Code. Regular Diet.     Plan: Patient is on a 72 Hour Hold and Suicide Precautions. Admission packet given. Care plan established. Education established. Admission process is complete. Will monitor.    /86 (BP Location: Right arm)   Pulse 71   Temp 97.8  F (36.6  C) (Temporal)   Resp 18   Ht 1.676 m (5' 6\")   Wt 101.3 kg (223 lb 4.8 oz)   SpO2 97%   BMI 36.04 kg/m      "

## 2024-03-27 NOTE — ED NOTES
Met w/ Helena VILLEGAS and orders received. One med is non- formulary and pt is trying to reach group home to request they bring this med. Requested Klonopin and this was given.

## 2024-03-27 NOTE — PHARMACY-ADMISSION MEDICATION HISTORY
Pharmacist Admission Medication History    Admission medication history is complete. The information provided in this note is only as accurate as the sources available at the time of the update.    Information Source(s): Patient, Clinic records, and CareEverywhere/SureScripts via in-person    Pertinent Information:   Fluphenazine (Prolixin) 25 mg IM every 14 days. Last dose 2/27/24 per patient and clinic records. (Has not gotten a dose in March)   Deutetrabenazine (Austedo): PA recently approved, patient reports not having recived it from the pharmacy yet since it had to be filled at a specialty pharmacy.   Semaglutide (Rybelsus): Patient reports that he was supposed to have only started the 3 mg tablet but didn't realize that and has been taking both 3 mg and 7 mg tablets at home. Should be starting 7 mg tablet once finishes the 3 mg tablet.      Changes made to PTA medication list:  Added: magnesium, zinc, turmeric (per patient)   Deleted: None  Changed: None    Allergies reviewed with patient and updates made in EHR: yes    Medication History Completed By: Kayla Connors [facility] 3/27/2024 10:31 AM    PTA Med List   Medication Sig Note Last Dose    amLODIPine (NORVASC) 5 MG tablet Take 10 mg by mouth daily  3/25/2024 at AM    amphetamine-dextroamphetamine (ADDERALL XR) 15 MG 24 hr capsule Take 1 capsule (15 mg) by mouth daily  3/25/2024 at AM    benzoyl peroxide 5 % external liquid Apply topically daily  Past Week    clindamycin (CLEOCIN T) 1 % external lotion Apply topically 2 times daily  Past Week    clonazePAM (KLONOPIN) 0.5 MG tablet Take 0.5 tablets (0.25 mg) by mouth daily as needed for anxiety  3/25/2024 at [facility]N    deutetrabenazine (AUSTEDO) 6 MG tablet Take 1 tablet (6 mg) by mouth daily 3/27/2024: PA recently approved 3/19, not started yet Not Started Yet    doxycycline hyclate (VIBRAMYCIN) 100 MG capsule Take 1 capsule (100 mg) by mouth every 12 hours  3/25/2024 at AM    empagliflozin (JARDIANCE) 10 MG  TABS tablet Take 1 tablet (10 mg) by mouth daily +++NEED APPOINTMENT+++  3/25/2024 at AM    fish oil-omega-3 fatty acids 1000 MG capsule Take 1 g by mouth daily  3/25/2024 at AM    fluPHENAZine decanoate (PROLIXIN) 25 MG/ML injection Inject 1 mL (25 mg) into the muscle every 14 days  2/27/2024    insulin glargine (LANTUS PEN) 100 UNIT/ML pen Inject 25 Units Subcutaneous every morning (before breakfast)  3/25/2024 at AM    lithium (ESKALITH CR/LITHOBID) 450 MG CR tablet Take 2 tablets (900 mg) by mouth at bedtime  3/24/2024 at PM    magnesium 250 MG tablet Take 2 tablets by mouth daily  Past Week    Multiple Vitamins-Minerals (ZINC PO) Take 1 tablet by mouth daily  Past Week    nicotine (NICODERM CQ) 21 MG/24HR 24 hr patch Place 1 patch onto the skin every 24 hours  Past Week    ondansetron (ZOFRAN ODT) 4 MG ODT tab Take 1 tablet (4 mg) by mouth every 8 hours as needed for nausea  Past Week at PRN    propranolol (INDERAL) 10 MG tablet Take 1 tablet (10 mg) by mouth 3 times daily  3/25/2024 at AM    QUEtiapine (SEROQUEL) 200 MG tablet Take 1 tablet (200 mg) by mouth at bedtime  3/24/2024 at [facility]    QUEtiapine (SEROQUEL) 25 MG tablet Take 1-2 tablets (25-50 mg) by mouth 2 times daily as needed (anxiety) 3/27/2024: Usually takes 50 mg per patient 3/24/2024 at PRN    rosuvastatin (CRESTOR) 40 MG tablet Take 1 tablet (40 mg) by mouth daily  3/24/2024    Semaglutide (RYBELSUS) 3 MG tablet Take 3 mg by mouth daily  3/25/2024 at AM    Semaglutide (RYBELSUS) 7 MG tablet Take 1 tablet (7 mg) by mouth daily 3/27/2024: Reported starting this medication with the 3 mg tablet but was supposed to start in April after finishing the 3 mg tablet. 3/25/2024    testosterone (ANDROGEL/TESTIM) 50 MG/5GM (1%) topical gel Place 1 packet (50 mg of testosterone) onto the skin daily  3/25/2024    Turmeric 500 MG CAPS Take 1 capsule by mouth daily  Past Week

## 2024-03-27 NOTE — ED PROVIDER NOTES
[facility] ED Mental Health Handoff Note:       Brief HPI:  This is a 33 year old adult signed out to me.   See initial ED Provider note for full details of the presentation. Voluntary but holdable.  Prolixin was able to be given due to inpatient admission status. White count mildly elevated but patient denies any f/c, sore throat, runny nose, cough, abdominal pain, urinary concerns, infectious symptoms  UA sent with question of infection but squam.  Given no symptoms will send UC and await results before starting antibiotics.  Patient is medically appropriate for mental health admission.  They want to leave and so 72 hour hold placed due to  telling them to kill roommate and caregiver.     Home meds reviewed and ordered/administered: Yes    Medically stable for inpatient mental health admission: Yes.    Evaluated by mental health: Yes. The recommendation is for inpatient mental health treatment. Bed search in process    Safety concerns: At the time I received sign out, there were no safety concerns.    Hold Status:  Active Orders   Legal    Health Officer Authority to Detain (FAVIO)     Frequency: Effective Now     Start Date/Time: 03/26/24 0204      Number of Occurrences: Until Specified     Order Comments: Hallucinations, with homicidal ideations             Exam:   Patient Vitals for the past 24 hrs:   BP Temp Temp src Pulse Resp SpO2   03/27/24 0843 (!) 141/92 97.8  F (36.6  C) Oral 106 18 97 %   03/26/24 1807 127/86 98.5  F (36.9  C) Oral 88 18 95 %           ED Course:    Medications   QUEtiapine (SEROquel) tablet 200 mg (200 mg Oral $Given 3/26/24 2234)   nicotine polacrilex (NICORETTE) gum 4 mg (4 mg Buccal $Given 3/27/24 1127)   clonazePAM (klonoPIN) half-tab 0.25 mg (0.25 mg Oral $Given 3/27/24 1231)   amLODIPine (NORVASC) tablet 10 mg (10 mg Oral $Given 3/27/24 0827)   doxycycline hyclate (VIBRAMYCIN) capsule 100 mg (100 mg Oral $Given 3/27/24 0829)   empagliflozin (JARDIANCE) tablet 10 mg (10 mg  Oral $Given 3/27/24 0829)   insulin glargine (LANTUS PEN) injection 25 Units (25 Units Subcutaneous $Given 3/27/24 1011)   lithium ER (LITHOBID) CR tablet 900 mg (900 mg Oral $Given 3/26/24 2130)   ondansetron (ZOFRAN ODT) ODT tab 4 mg (4 mg Oral $Given 3/27/24 0752)   propranolol (INDERAL) tablet 10 mg (10 mg Oral $Given 3/27/24 0751)   rosuvastatin (CRESTOR) tablet 40 mg (40 mg Oral $Given 3/27/24 0828)   Semaglutide (RYBELSUS) tablet 3 mg (3 mg Oral Not Given 3/26/24 2145)   OLANZapine zydis (zyPREXA) ODT tab 10 mg (10 mg Oral $Given 3/26/24 1902)   amphetamine-dextroamphetamine (ADDERALL XR) ER cap 15 mg (15 mg Oral $Given 3/27/24 1127)   deutetrabenazine (AUSTEDO) tablet 6 mg (has no administration in time range)   Semaglutide (RYBELSUS) tablet 7 mg (has no administration in time range)   fluPHENAZine decanoate (PROLIXIN) injection 25 mg (25 mg Intramuscular $Given 3/27/24 1223)   nicotine (NICORETTE) gum 4 mg (4 mg Buccal $Given 3/26/24 1057)            There were no significant events during my shift.    Patient was signed out to the oncoming provider.       Impression:    ICD-10-CM    1. Borderline personality disorder (H)  F60.3       2. Moderate episode of recurrent major depressive disorder (H)  F33.1       3. Aggressive behavior  R46.89           Plan:    Awaiting inpatient mental health admission/transfer.      RESULTS:   Results for orders placed or performed during the hospital encounter of 03/25/24 (from the past 24 hour(s))   Glucose by meter     Status: Abnormal    Collection Time: 03/26/24  9:55 PM   Result Value Ref Range    GLUCOSE BY METER POCT 139 (H) 70 - 99 mg/dL   Glucose by meter     Status: Abnormal    Collection Time: 03/27/24  8:23 AM   Result Value Ref Range    GLUCOSE BY METER POCT 144 (H) 70 - 99 mg/dL   Urine Drug Screen     Status: Abnormal    Collection Time: 03/27/24 10:03 AM    Narrative    The following orders were created for panel order Urine Drug Screen.  Procedure                                Abnormality         Status                     ---------                               -----------         ------                     Urine Drug Screen Panel[[id number]]      Abnormal            Final result                 Please view results for these tests on the individual orders.   Urine Drug Screen Panel     Status: Abnormal    Collection Time: 03/27/24 10:03 AM   Result Value Ref Range    Amphetamines Urine Screen Negative Screen Negative    Barbituates Urine Screen Negative Screen Negative    Benzodiazepine Urine Screen Negative Screen Negative    Cannabinoids Urine Screen Positive (A) Screen Negative    Cocaine Urine Screen Negative Screen Negative    Fentanyl Qual Urine Screen Negative Screen Negative    Opiates Urine Screen Negative Screen Negative    PCP Urine Screen Negative Screen Negative   CBC with Platelets & Differential     Status: Abnormal    Collection Time: 03/27/24 10:24 AM    Narrative    The following orders were created for panel order CBC with Platelets & Differential.  Procedure                               Abnormality         Status                     ---------                               -----------         ------                     CBC with platelets and d...[[id number]]  Abnormal            Final result                 Please view results for these tests on the individual orders.   Comprehensive metabolic panel     Status: Abnormal    Collection Time: 03/27/24 10:24 AM   Result Value Ref Range    Sodium 139 135 - 145 mmol/L    Potassium 4.2 3.4 - 5.3 mmol/L    Carbon Dioxide (CO2) 23 22 - 29 mmol/L    Anion Gap 11 7 - 15 mmol/L    Urea Nitrogen 11.9 6.0 - 20.0 mg/dL    Creatinine 0.65 0.51 - 1.17 mg/dL    GFR Estimate >90 >60 mL/min/1.73m2    Calcium 9.9 8.6 - 10.0 mg/dL    Chloride 105 98 - 107 mmol/L    Glucose 133 (H) 70 - 99 mg/dL    Alkaline Phosphatase 78 40 - 150 U/L    AST 77 (H) 0 - 45 U/L    ALT 83 (H) 0 - 70 U/L    Protein Total 7.6 6.4 - 8.3 g/dL     "Albumin 4.6 3.5 - 5.2 g/dL    Bilirubin Total 0.5 <=1.2 mg/dL    Narrative    The generation of reference intervals for this test is currently based on binary male or female sex. If the electronic health record information indicates another gender identity or if Legal Sex is recorded as \"Unknown\", both male and female reference intervals are provided where applicable, and should be considered according to the individual's appropriate clinical context.   TSH with free T4 reflex     Status: Normal    Collection Time: 03/27/24 10:24 AM   Result Value Ref Range    TSH 1.32 0.30 - 4.20 uIU/mL   CBC with platelets and differential     Status: Abnormal    Collection Time: 03/27/24 10:24 AM   Result Value Ref Range    WBC Count 12.1 (H) 4.0 - 11.0 10e3/uL    RBC Count 5.59 3.80 - 5.90 10e6/uL    Hemoglobin 15.5 13.3 - 17.7 g/dL    Hematocrit 46.7 35.0 - 53.0 %    MCV 84 78 - 100 fL    MCH 27.7 26.5 - 33.0 pg    MCHC 33.2 31.5 - 36.5 g/dL    RDW 14.7 10.0 - 15.0 %    Platelet Count 306 150 - 450 10e3/uL    % Neutrophils 69 %    % Lymphocytes 23 %    % Monocytes 5 %    % Eosinophils 2 %    % Basophils 1 %    % Immature Granulocytes 0 %    NRBCs per 100 WBC 0 <1 /100    Absolute Neutrophils 8.3 1.6 - 8.3 10e3/uL    Absolute Lymphocytes 2.8 0.8 - 5.3 10e3/uL    Absolute Monocytes 0.6 0.0 - 1.3 10e3/uL    Absolute Eosinophils 0.2 0.0 - 0.7 10e3/uL    Absolute Basophils 0.1 0.0 - 0.2 10e3/uL    Absolute Immature Granulocytes 0.0 <=0.4 10e3/uL    Absolute NRBCs 0.0 10e3/uL    Narrative    The generation of reference intervals for this test is currently based on binary male or female sex. If the electronic health record information indicates another gender identity or if Legal Sex is recorded as \"Unknown\", both male and female reference intervals are provided where applicable, and should be considered according to the individual's appropriate clinical context.             MD Alberto Ballard Cara, " MD  03/27/24 1412       Sylvia Dominguez MD  03/27/24 1527       Sylvia Dominguez MD  03/27/24 1539

## 2024-03-27 NOTE — PROGRESS NOTES
Triage & Transition Services, Extended Care     Client Name: Prakash Prasad    Date: March 27, 2024      Service Type: attended group session  Session Start Time:  1100    Session End Time: 1119  Session Length: 19  Site Location: [facility] EMERGENCY DEPARTMENT                             [id number]  Total Number ofAttendees: 4  Topic: emotions/expression, coping skills/lifestyle management   Response: cooperative with task, did not share thoughts verbally     RAYMUNDO Man   MSW Intern, Extended Care  [phone]

## 2024-03-27 NOTE — TELEPHONE ENCOUNTER
R: [place]  Access Inpatient Bed Call Log  3/27/24 @ 3:00am   Intake has called facilities that have not updated their bed status within the last 12 hours.??      *[facility]:  [place] [facility]: @ cap per website.     Pt remains on waitlist pending appropriate placement availability

## 2024-03-27 NOTE — TELEPHONE ENCOUNTER
amphetamine-dextroamphetamine (ADDERALL XR) 15 MG 24 hr capsule 30 capsule 0 3/22/2024 - No  Sig - Route: Take 1 capsule (15 mg) by mouth daily - Oral  Class: E-Prescribe  ------------------------------    Refill decision: Refill pended and routed to the provider for review/determination due to the following criteria not met: Controlled medication    Medication unable to be refilled by RN due to criteria not met as indicated.                 []Eligibility - not seen in the last year              []Supervision - no future appointment              []Compliance - no shows, cancellations or lapse in therapy              []Verification - order discrepancy              [x]Controlled medication              []Medication not included in policy              []90-day supply request              [x]Other: currently admitted to hospital  3/25/2024 - present (2 days)  [facility] Emergency Department

## 2024-03-28 LAB
ALBUMIN SERPL BCG-MCNC: 4.4 G/DL (ref 3.5–5.2)
ALBUMIN SERPL BCG-MCNC: 4.5 G/DL (ref 3.5–5.2)
ALBUMIN UR-MCNC: NEGATIVE MG/DL
ALP SERPL-CCNC: 73 U/L (ref 40–150)
ALP SERPL-CCNC: 76 U/L (ref 40–150)
ALT SERPL W P-5'-P-CCNC: 71 U/L (ref 0–70)
ALT SERPL W P-5'-P-CCNC: 76 U/L (ref 0–70)
AMPHETAMINES UR QL SCN: ABNORMAL
APAP SERPL-MCNC: <5 UG/ML (ref 10–30)
APPEARANCE UR: CLEAR
AST SERPL W P-5'-P-CCNC: 61 U/L (ref 0–45)
AST SERPL W P-5'-P-CCNC: 62 U/L (ref 0–45)
BACTERIA UR CULT: NORMAL
BARBITURATES UR QL SCN: ABNORMAL
BENZODIAZ UR QL SCN: ABNORMAL
BILIRUB DIRECT SERPL-MCNC: <0.2 MG/DL (ref 0–0.3)
BILIRUB DIRECT SERPL-MCNC: <0.2 MG/DL (ref 0–0.3)
BILIRUB SERPL-MCNC: 0.6 MG/DL
BILIRUB SERPL-MCNC: 0.6 MG/DL
BILIRUB UR QL STRIP: NEGATIVE
BZE UR QL SCN: ABNORMAL
CANNABINOIDS UR QL SCN: ABNORMAL
COLOR UR AUTO: ABNORMAL
FENTANYL UR QL: ABNORMAL
GLUCOSE BLDC GLUCOMTR-MCNC: 138 MG/DL (ref 70–99)
GLUCOSE UR STRIP-MCNC: >=1000 MG/DL
HGB UR QL STRIP: NEGATIVE
HOLD SPECIMEN: NORMAL
HOLD SPECIMEN: NORMAL
KETONES UR STRIP-MCNC: 40 MG/DL
LEUKOCYTE ESTERASE UR QL STRIP: ABNORMAL
LITHIUM SERPL-SCNC: 0.73 MMOL/L (ref 0.6–1.2)
MUCOUS THREADS #/AREA URNS LPF: PRESENT /LPF
NITRATE UR QL: NEGATIVE
OPIATES UR QL SCN: ABNORMAL
PCP QUAL URINE (ROCHE): ABNORMAL
PH UR STRIP: 7 [PH] (ref 5–7)
PROT SERPL-MCNC: 7 G/DL (ref 6.4–8.3)
PROT SERPL-MCNC: 7.4 G/DL (ref 6.4–8.3)
RBC URINE: 1 /HPF
SP GR UR STRIP: 1.02 (ref 1–1.03)
SQUAMOUS EPITHELIAL: 16 /HPF
TRANSITIONAL EPI: <1 /HPF
UROBILINOGEN UR STRIP-MCNC: NORMAL MG/DL
WBC URINE: 16 /HPF

## 2024-03-28 PROCEDURE — 80076 HEPATIC FUNCTION PANEL: CPT | Performed by: PSYCHIATRY & NEUROLOGY

## 2024-03-28 PROCEDURE — 250N000013 HC RX MED GY IP 250 OP 250 PS 637

## 2024-03-28 PROCEDURE — 250N000013 HC RX MED GY IP 250 OP 250 PS 637: Performed by: PSYCHIATRY & NEUROLOGY

## 2024-03-28 PROCEDURE — 36415 COLL VENOUS BLD VENIPUNCTURE: CPT | Performed by: PSYCHIATRY & NEUROLOGY

## 2024-03-28 PROCEDURE — 80143 DRUG ASSAY ACETAMINOPHEN: CPT

## 2024-03-28 PROCEDURE — 80178 ASSAY OF LITHIUM: CPT

## 2024-03-28 PROCEDURE — 250N000013 HC RX MED GY IP 250 OP 250 PS 637: Performed by: STUDENT IN AN ORGANIZED HEALTH CARE EDUCATION/TRAINING PROGRAM

## 2024-03-28 PROCEDURE — G0177 OPPS/PHP; TRAIN & EDUC SERV: HCPCS

## 2024-03-28 PROCEDURE — 124N000002 HC R&B MH UMMC

## 2024-03-28 PROCEDURE — 250N000013 HC RX MED GY IP 250 OP 250 PS 637: Performed by: EMERGENCY MEDICINE

## 2024-03-28 PROCEDURE — 81001 URINALYSIS AUTO W/SCOPE: CPT | Performed by: NURSE PRACTITIONER

## 2024-03-28 PROCEDURE — 99222 1ST HOSP IP/OBS MODERATE 55: CPT

## 2024-03-28 PROCEDURE — 87086 URINE CULTURE/COLONY COUNT: CPT | Performed by: NURSE PRACTITIONER

## 2024-03-28 PROCEDURE — 250N000011 HC RX IP 250 OP 636: Performed by: PSYCHIATRY & NEUROLOGY

## 2024-03-28 PROCEDURE — A9270 NON-COVERED ITEM OR SERVICE: HCPCS | Performed by: NURSE PRACTITIONER

## 2024-03-28 PROCEDURE — 90837 PSYTX W PT 60 MINUTES: CPT

## 2024-03-28 PROCEDURE — 99233 SBSQ HOSP IP/OBS HIGH 50: CPT | Performed by: NURSE PRACTITIONER

## 2024-03-28 PROCEDURE — 250N000013 HC RX MED GY IP 250 OP 250 PS 637: Performed by: NURSE PRACTITIONER

## 2024-03-28 PROCEDURE — 80307 DRUG TEST PRSMV CHEM ANLYZR: CPT | Performed by: EMERGENCY MEDICINE

## 2024-03-28 PROCEDURE — 80076 HEPATIC FUNCTION PANEL: CPT

## 2024-03-28 RX ORDER — DEXTROSE MONOHYDRATE 25 G/50ML
25-50 INJECTION, SOLUTION INTRAVENOUS
Status: DISCONTINUED | OUTPATIENT
Start: 2024-03-28 | End: 2024-04-12 | Stop reason: HOSPADM

## 2024-03-28 RX ORDER — FLUPHENAZINE HYDROCHLORIDE 5 MG/1
5 TABLET ORAL 2 TIMES DAILY PRN
Status: DISCONTINUED | OUTPATIENT
Start: 2024-03-28 | End: 2024-04-12 | Stop reason: HOSPADM

## 2024-03-28 RX ORDER — QUETIAPINE FUMARATE 25 MG/1
25-50 TABLET, FILM COATED ORAL 2 TIMES DAILY PRN
Status: CANCELLED | OUTPATIENT
Start: 2024-03-28

## 2024-03-28 RX ORDER — NICOTINE POLACRILEX 4 MG
15-30 LOZENGE BUCCAL
Status: DISCONTINUED | OUTPATIENT
Start: 2024-03-28 | End: 2024-04-12 | Stop reason: HOSPADM

## 2024-03-28 RX ORDER — ACETAMINOPHEN 325 MG/1
650 TABLET ORAL ONCE
Status: COMPLETED | OUTPATIENT
Start: 2024-03-28 | End: 2024-03-28

## 2024-03-28 RX ADMIN — NICOTINE POLACRILEX 4 MG: 4 GUM, CHEWING BUCCAL at 16:13

## 2024-03-28 RX ADMIN — AMLODIPINE BESYLATE 10 MG: 10 TABLET ORAL at 08:15

## 2024-03-28 RX ADMIN — NICOTINE POLACRILEX 4 MG: 4 GUM, CHEWING BUCCAL at 10:06

## 2024-03-28 RX ADMIN — INSULIN GLARGINE 25 UNITS: 100 INJECTION, SOLUTION SUBCUTANEOUS at 08:17

## 2024-03-28 RX ADMIN — PROPRANOLOL HYDROCHLORIDE 10 MG: 10 TABLET ORAL at 20:51

## 2024-03-28 RX ADMIN — NICOTINE POLACRILEX 4 MG: 4 GUM, CHEWING BUCCAL at 14:29

## 2024-03-28 RX ADMIN — DOXYCYCLINE HYCLATE 100 MG: 100 CAPSULE ORAL at 20:51

## 2024-03-28 RX ADMIN — OLANZAPINE 10 MG: 10 TABLET, FILM COATED ORAL at 12:28

## 2024-03-28 RX ADMIN — QUETIAPINE FUMARATE 200 MG: 200 TABLET ORAL at 20:51

## 2024-03-28 RX ADMIN — HYDROXYZINE HYDROCHLORIDE 25 MG: 25 TABLET, FILM COATED ORAL at 14:30

## 2024-03-28 RX ADMIN — BENZOYL PEROXIDE: 50 LOTION TOPICAL at 09:40

## 2024-03-28 RX ADMIN — NICOTINE POLACRILEX 4 MG: 4 GUM, CHEWING BUCCAL at 11:22

## 2024-03-28 RX ADMIN — ROSUVASTATIN 40 MG: 20 TABLET, FILM COATED ORAL at 08:15

## 2024-03-28 RX ADMIN — CLINDAMYCIN PHOSPHATE: 10 LOTION TOPICAL at 20:53

## 2024-03-28 RX ADMIN — Medication 1 G: at 08:15

## 2024-03-28 RX ADMIN — EMPAGLIFLOZIN 10 MG: 10 TABLET, FILM COATED ORAL at 08:15

## 2024-03-28 RX ADMIN — Medication 500 MG: at 08:15

## 2024-03-28 RX ADMIN — NICOTINE 1 PATCH: 21 PATCH, EXTENDED RELEASE TRANSDERMAL at 17:48

## 2024-03-28 RX ADMIN — DEUTETRABENAZINE 6 MG: 6 TABLET, COATED ORAL at 11:10

## 2024-03-28 RX ADMIN — NICOTINE POLACRILEX 4 MG: 4 GUM, CHEWING BUCCAL at 20:51

## 2024-03-28 RX ADMIN — PROPRANOLOL HYDROCHLORIDE 10 MG: 10 TABLET ORAL at 13:40

## 2024-03-28 RX ADMIN — NICOTINE POLACRILEX 4 MG: 4 GUM, CHEWING BUCCAL at 17:18

## 2024-03-28 RX ADMIN — TESTOSTERONE 50 MG OF TESTOSTERONE: 50 GEL TRANSDERMAL at 11:12

## 2024-03-28 RX ADMIN — NICOTINE POLACRILEX 4 MG: 4 GUM, CHEWING BUCCAL at 08:21

## 2024-03-28 RX ADMIN — DOXYCYCLINE HYCLATE 100 MG: 100 CAPSULE ORAL at 08:17

## 2024-03-28 RX ADMIN — LITHIUM CARBONATE 900 MG: 450 TABLET, EXTENDED RELEASE ORAL at 20:51

## 2024-03-28 RX ADMIN — CLINDAMYCIN PHOSPHATE: 10 LOTION TOPICAL at 08:18

## 2024-03-28 RX ADMIN — CLONAZEPAM 0.25 MG: 0.5 TABLET ORAL at 11:21

## 2024-03-28 RX ADMIN — ZINC SULFATE 220 MG (50 MG) CAPSULE 220 MG: CAPSULE at 08:15

## 2024-03-28 RX ADMIN — OLANZAPINE 10 MG: 10 TABLET, FILM COATED ORAL at 17:18

## 2024-03-28 RX ADMIN — PROPRANOLOL HYDROCHLORIDE 10 MG: 10 TABLET ORAL at 08:15

## 2024-03-28 RX ADMIN — ONDANSETRON 4 MG: 4 TABLET, FILM COATED ORAL at 10:06

## 2024-03-28 RX ADMIN — ACETAMINOPHEN 650 MG: 325 TABLET, FILM COATED ORAL at 13:40

## 2024-03-28 RX ADMIN — NICOTINE POLACRILEX 4 MG: 4 GUM, CHEWING BUCCAL at 12:28

## 2024-03-28 ASSESSMENT — ACTIVITIES OF DAILY LIVING (ADL)
ADLS_ACUITY_SCORE: 54
DRESS: INDEPENDENT
ADLS_ACUITY_SCORE: 54
ADLS_ACUITY_SCORE: 54
HYGIENE/GROOMING: INDEPENDENT
ADLS_ACUITY_SCORE: 54
ORAL_HYGIENE: INDEPENDENT
ADLS_ACUITY_SCORE: 54

## 2024-03-28 NOTE — PLAN OF CARE
Problem: Adult Inpatient Plan of Care  Goal: Optimal Comfort and Wellbeing  Intervention: Provide Person-Centered Care  Recent Flowsheet Documentation  Taken 3/28/2024 1049 by Vicky Anaya, RN  Trust Relationship/Rapport:   emotional support provided   empathic listening provided   thoughts/feelings acknowledged   Goal Outcome Evaluation:       Pt. Put on a SIO @10 feet, for assault risk, pt endorses urges to strangle staff and harm people, and is struggling with not acting out. PRN Zyprexa administered        Pt. Presents with a flat guarded affect, pleasant on approach, calm and cooperative, visible in the lounge isolative to self, attended and participated in groups. Pt request to wear street clothes was denied by the provider, okay for pt. To read his book. Pt frequently seeks out staff with multiple request.      Pt. Speech is soft/quiet, clear and coherent, thought process has poor concentration, hygiene is well kempt.     Pt. Rates high anxiety PRN Klonopin and Hydroxyzine administered, denies depression, endorses command auditory hallucinations telling him to harm people, uses music and groups as distractions, PRN Zyprexa helpful. Denies SI/SIB, C/O of feeling nauseous PRN Zofran administered. Medication compliance, frequent request for nicotine gum and contracted for safety.     UA collected, for a drug screen and to check for a UTI.      Pt. Rating 7/10 sharp eye pain,  seen by internal medicine PRN Tylenol x 1 dose ordered.

## 2024-03-28 NOTE — PLAN OF CARE
03/28/24 1438   Group Therapy Session   Group Attendance attended group session   Time Session Began 1115   Time Session Ended 1200   Total Time (minutes) 45   Total # Attendees 4   Group Type life skill;other (see comments)  (OT)   Group Topic Covered balanced lifestyle;coping skills/lifestyle management;other (see comments)  (sensory)   Group Session Detail OT - Coping: Focus of group was basic introduction of using both internal and external senses for calming and alerting self for self regulation and overall mental health management.    Patient Response/Contribution able to recall/repeat info presented;cooperative with task;discussed personal experience with topic;expressed understanding of topic   Patient Participation Detail Pt noted understanding of the topic and was able to identify a few specific types of techniques with several of the various senses. Initially was engaged in the discussion and open to try some of the options offered. Near the end of group, he tended to look down at the table and briefer responses to staff.

## 2024-03-28 NOTE — PLAN OF CARE
03/28/24 1441   Group Therapy Session   Group Attendance attended group session   Time Session Began 1300   Time Session Ended 1345   Total Time (minutes) 30 - no charge   Total # Attendees 3   Group Type life skill;other (see comments)  (OT)   Group Topic Covered balanced lifestyle;cognitive activities;leisure exploration/use of leisure time;structured socialization   Group Session Detail OT - Topic: Focus of group was on identifying specific benefits and values they would like from leisure and then specific activities that are and can engage in for mental health management and well being. Activity focus was group brain/cognitive activity for identification of various leisure interests.   Patient Response/Contribution able to recall/repeat info presented;cooperative with task;discussed personal experience with topic;expressed understanding of topic   Patient Participation Detail Pt easily engaged in activity and as smiling at times. He then began to stand and pace in the room, yet still participated in his turn. He then asked staff if there was a fidget in the room he could use. Staff offered one for use in the group. He chose to leave group early and did return fidget to staff. Later, staff offered a fidget for use on the unit, which he accepted.

## 2024-03-28 NOTE — PLAN OF CARE
Team Note Due:  Friday    Assessment/Intervention/Current Symtoms and Care Coordination:  Chart review, discussed pt progress, symptomology, and response to treatment.  Discussed the discharge plan and any potential impediments to discharge.    I called [facility] Director and Nurse: Domenica [phone] (VAL signed) and confirmed Pt can return there once stable. She didn't know if he had a Ashley.      I called : Cristy Ji: [facility], [phone] (VAL signed) -  Voicemail says Abigail Phoenix. I left a voicemail with regular [facility] ph # to coordinate care.     Pt completed safety plan with unit therapist.    Pt shared with RN he has thoughts to choke or harm others. Placed on SIO. There is discussion about whether or not an SIO is beneficial for Pt due to hx of cluster b symptomology, and this being his pattern.     I called Cyn at [facility] to confirm no Ashley order. [facility]. I updated provider to update legal status.     I completed initial psychosocial assessment.     Discharge Plan or Goal:  Return to group and resume w/ outpatient providers.      Barriers to Discharge:  HI threats     Referral Status:  None     Legal Status:  Pt is on a continued MI commitment as of 02/09/2024 until Report Date: 12/14/2024   [facility]  File Number: [id number]  I called Cyn at [facility]'s [facility] and she states there is no Ashley.    Contacts:  [facility] Nurse: Domenica [phone] (VAL signed)      : Cristy Ji: [facility], [phone] (VAL signed) -  Voicemail says Abigail Phoenix.     Upcoming Meetings and Dates/Important Information and next steps:    Pt is a restricted recipient:  Per chart: Insurance Type: Medicaid  Contracted Service Provider: [facility] Provider ID: [id number]     Pt is restricted Provider number  [id number], [employer] is a provider for a Restricted Recipient for: Pharmacy    Provider number [id number], JENNIFER LEY is a provider for a Restricted Recipient for: Physician Services ,  Nurse Practitioner Services    Provider number [id number], [facility] is a provider for a Restricted Recipient for: Physician Services ,  Inpatient Hospital ,  Diagnostic Lab ,  Outpatient Hospital    Provider number [id number], [facility] is a provider for a Restricted   RRP Referral Needed within 90 days of Service
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
## 2024-03-28 NOTE — PROGRESS NOTES
Brief Internal Medicine Progress Note    Was notified by nursing staff that patient had pain 8/10 behind his left eye. On exam, nursing staff did not notice any swelling, drainage, purulent discharge, erythema, or proptosis. Pt did not appear to be in any distress or pain. Was seen by me and physical exam below.     Plan:     - APAP x 1   - CTM     Physical exam:   Conjunctivae are clear without exudates or hemorrhage. Sclera is non-icteric. EOM are intact, PERRLA. Fundi appear normal including optic discs and vessels. No signs of nystagmus. Eyelids are normal in appearance without swelling or lesions. No proptosis, bleeding, drainage. No trauma to area or swelling of orbit.   Neuro: Cranial nerves II-12 intact, no unilateral neurological changes     Jd Martínez PA-C   [facility]  Securely message with the [employer] Console (learn more here)  Text page via [employer] Paging/Directory

## 2024-03-28 NOTE — PLAN OF CARE
"INITIAL OCCUPATIONAL THERAPY NOTE   03/28/24 1432   Group Therapy Session   Group Attendance attended group session   Time Session Began 1015   Time Session Ended 1100   Total Time (minutes) 45   Total # Attendees 4   Group Type life skill;task skill;other (see comments)  (OT)   Group Topic Covered balanced lifestyle;coping skills/lifestyle management;leisure exploration/use of leisure time;structured socialization   Group Session Detail OT Clinic: Activity group for creative expression, promoting autonomy, building self worth, coping with stress and symptoms, and an opportunity to exercise cognitive skills (I.e. initiation, planning, organizing, sequencing, sustained attention, follow through, and overall self awareness).   Patient Response/Contribution able to recall/repeat info presented;cooperative with task;discussed personal experience with topic;expressed understanding of topic   Patient Participation Detail Pt noted understanding of OT and shared about previous times being in hospital, reporting \"last year was hard as I was in the hospital several times\". Affect was flat yet he easily engaged in conversation with staff and affect brightened when he talked about other staff he has worked with while hospitalized and wondering if they were available to work with again on this unit. He chose a familiar structured creative diversion activity and focused on this for about half the session. He did clean up, leave, and returned a few minutes later with a book and chose to read until staff noted end of group time. Will continue to assess.                             "

## 2024-03-28 NOTE — PLAN OF CARE
"Goal Outcome Evaluation:    Initial meeting note:    Therapist introduced self to patient and discussed psychotherapy service available to patient.     Pt response: Pt expressed interest in meeting with therapist    Plan: Made plan to meet with pt again; began identifying goals      Met today and did meet and greet and therapy, and completed safety planning. Would like to meet tomorrow for Art Therapy 1:1 Would like order to get photos of support dog from phone to use in Art Therapy 1:1 session..     Individual Therapy Note      Date of Service: March 28, 2024    Patient: Prakash goes by \"Prakash,\" uses he/him pronouns    Individuals Present: NATHAN Mott    Session start: 12:20 pm  Session end: 1:00 pm  Session duration in minutes: 40    Patient Active Problem List   Diagnosis    ADHD (attention deficit hyperactivity disorder)    Bipolar 1 disorder, manic, mild    Marijuana abuse    Polysubstance abuse (H)    GERD (gastroesophageal reflux disease)    Tobacco abuse    Intractable back pain    Optic neuritis    Cauda equina syndrome with neurogenic bladder (H)    Schizoaffective disorder, bipolar type (H)    PTSD (post-traumatic stress disorder)    Anxiety    Auditory hallucination    Nephrolithiasis    Cyst of left ovary    Borderline personality disorder (H)    Cannabis use disorder, severe, dependence (H)    Depression    Episodic mood disorder (H24)    History of heroin abuse (H)    Moderate episode of recurrent major depressive disorder (H)    Opioid use disorder, severe, dependence (H)    Substance-induced psychotic disorder with hallucinations (H)    Nausea    Urinary retention    Chronic bilateral low back pain without sciatica    AVA (generalized anxiety disorder)    Aggressive behavior    Gender identity disorder    Lumbosacral radiculopathy at L5    DUB (dysfunctional uterine bleeding)    Seizure-like activity (H)    Acanthosis nigricans    Schizoaffective disorder, chronic condition with " "acute exacerbation (H)    Bipolar affective disorder, mixed, severe, with psychotic behavior (H)    Schizophrenia, schizoaffective, chronic with acute exacerbation (H)    Akathisia    Hypertension, unspecified type    Female-to-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 person    Morbid obesity (H)    Diabetes mellitus, type 2 (H)    Mood disorder due to a general medical condition    Pain of right hand    Acne vulgaris         Modality Used:CBT, DBT, Person Centered, Rapport Building, and Motivational Interviewing    Goals: Ccalm anxiety, intrusive thoughts, safety planning, discuss safety, 1:1, says afraid they can't be safe, auditory hallucinations or OCD intrusive thoughts to harm others, staff included by \"putting hands around necks.\" 1:1 is ordered due to unable to contract for safety to others.    Patient Description of current symptoms: very anxious, racing intrusive thoughts     Mental Status Exam:   Attitude: cooperative  Eye Contact: fair  Mood: anxious and depressed  Affect: intensity is heightened, intensity is dramatic, and reactive  Speech: clear, coherent  Psychomotor Behavior: no evidence of tardive dyskinesia, dystonia, or tics  Thought Process:  illogical  Associations: loosening of associations present  Thought Content: auditory hallucinations present, obsessions present, and active homicidal thoughts indicated  Insight: limited  Judgement: limited  Attention Span and Concentration: fair    Pt progress: Pt wants to get better reports, but unable to control thoughts. Pt also complaining of eye pain and pressure    Treatment Objective(s) Addressed:   The focus of this session was on rapport building, orienting the patient to therapy, safety planning, building distress tolerance, and identifying treatment goals     Progress Towards Goals and Assessment of Patient:   Patient is making progress towards treatment goals as evidenced by willingness to engage and ask for help and support.       Therapeutic Intervention(s): "   Provided active listening, unconditional positive regard, and validation. Engaged in safety planning.  Engaged in guided discovery, explored patient's perspectives and helped expand them through socratic dialogue. Coached on coping techniques/relaxation skills to help improve distress tolerance and managing intense emotions. Provided positive reinforcement for progress towards goals, gains in knowledge, and application of skills previously taught.  Identified and practiced coping skills. Engaged in relaxation training (e.g. meditation, progressive muscle relaxation, etc.). Identified stress relief practices. Introduced and practiced urge surfing. Introduced and discussed radical acceptance. Explored strategies for self-soothing.  Discussed and practiced mindfulness.  Explored barriers to safety of self and others.    Plan/next step: meet on Friday for 1:1 art theraoy. Would be helpful to have an order to look at phone for pictures of dog, writer can print and use in    [id number] - Psychotherapy (with patient) - 45 (38-52*) min
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
## 2024-03-28 NOTE — PLAN OF CARE
Night Nursing Note 3/27/24 - 3/28/24        Prakash was in bed at beginning of shift and appeared asleep.  Monitored q15 mins for safety.  Appeared to sleep majority of night without difficulty.  Continue to monitor, offer support and reassurance per care plan.    Slept 7 hrs.

## 2024-03-28 NOTE — PLAN OF CARE
BEH IP Unit Acuity Rating Score (UARS)  Patient is given one point for every criteria they meet.    CRITERIA SCORING   On a 72 hour hold, court hold, committed, stay of commitment, or revocation. 1   Patient LOS on BEH unit exceeds 20 days. 0  LOS: 1   Patient under guardianship, 55+, otherwise medically complex, or under age 11. 0   Suicide ideation without relief of precipitating factors. 0   Current plan for suicide. 0   Current plan for homicide. 1   Imminent risk or actual attempt to seriously harm another without relief of factors precipitating the attempt. 1   Severe dysfunction in daily living (ex: complete neglect for self care, extreme disruption in vegetative function, extreme deterioration in social interactions). 0   Recent (last 7 days) or current physical aggression in the ED or on unit. 0   Restraints or seclusion episode in past 72 hours. 0   Recent (last 7 days) or current verbal aggression, agitation, yelling, etc., while in the ED or unit. 0   Active psychosis. 1   Need for constant or near constant redirection (from leaving, from others, etc).  0   Intrusive or disruptive behaviors. 0   Patient requires 3 or more hours of individualized nursing care per 8-hour shift (i.e. for ADLs, meds, therapeutic interventions). 0   TOTAL 4

## 2024-03-29 LAB
BACTERIA UR CULT: NORMAL
GLUCOSE BLDC GLUCOMTR-MCNC: 108 MG/DL (ref 70–99)
GLUCOSE BLDC GLUCOMTR-MCNC: 120 MG/DL (ref 70–99)
GLUCOSE BLDC GLUCOMTR-MCNC: 90 MG/DL (ref 70–99)
LITHIUM SERPL-SCNC: 1.04 MMOL/L (ref 0.6–1.2)

## 2024-03-29 PROCEDURE — 250N000013 HC RX MED GY IP 250 OP 250 PS 637: Performed by: PSYCHIATRY & NEUROLOGY

## 2024-03-29 PROCEDURE — 250N000013 HC RX MED GY IP 250 OP 250 PS 637

## 2024-03-29 PROCEDURE — 250N000013 HC RX MED GY IP 250 OP 250 PS 637: Performed by: EMERGENCY MEDICINE

## 2024-03-29 PROCEDURE — 99233 SBSQ HOSP IP/OBS HIGH 50: CPT | Performed by: PSYCHIATRY & NEUROLOGY

## 2024-03-29 PROCEDURE — 124N000002 HC R&B MH UMMC

## 2024-03-29 PROCEDURE — 36415 COLL VENOUS BLD VENIPUNCTURE: CPT | Performed by: NURSE PRACTITIONER

## 2024-03-29 PROCEDURE — 250N000013 HC RX MED GY IP 250 OP 250 PS 637: Performed by: STUDENT IN AN ORGANIZED HEALTH CARE EDUCATION/TRAINING PROGRAM

## 2024-03-29 PROCEDURE — 80178 ASSAY OF LITHIUM: CPT | Performed by: NURSE PRACTITIONER

## 2024-03-29 PROCEDURE — 250N000011 HC RX IP 250 OP 636: Performed by: PSYCHIATRY & NEUROLOGY

## 2024-03-29 PROCEDURE — 250N000013 HC RX MED GY IP 250 OP 250 PS 637: Performed by: NURSE PRACTITIONER

## 2024-03-29 PROCEDURE — G0177 OPPS/PHP; TRAIN & EDUC SERV: HCPCS

## 2024-03-29 PROCEDURE — A9270 NON-COVERED ITEM OR SERVICE: HCPCS | Performed by: NURSE PRACTITIONER

## 2024-03-29 RX ORDER — GABAPENTIN 600 MG/1
TABLET ORAL
Qty: 180 TABLET | Refills: 10 | OUTPATIENT
Start: 2024-03-29

## 2024-03-29 RX ORDER — DEXTROAMPHETAMINE SACCHARATE, AMPHETAMINE ASPARTATE MONOHYDRATE, DEXTROAMPHETAMINE SULFATE AND AMPHETAMINE SULFATE 3.75; 3.75; 3.75; 3.75 MG/1; MG/1; MG/1; MG/1
15 CAPSULE, EXTENDED RELEASE ORAL DAILY
Status: DISCONTINUED | OUTPATIENT
Start: 2024-03-29 | End: 2024-04-12 | Stop reason: HOSPADM

## 2024-03-29 RX ADMIN — NICOTINE POLACRILEX 4 MG: 4 GUM, CHEWING BUCCAL at 13:24

## 2024-03-29 RX ADMIN — OLANZAPINE 10 MG: 10 TABLET, FILM COATED ORAL at 11:19

## 2024-03-29 RX ADMIN — INSULIN GLARGINE 25 UNITS: 100 INJECTION, SOLUTION SUBCUTANEOUS at 08:12

## 2024-03-29 RX ADMIN — NICOTINE POLACRILEX 4 MG: 4 GUM, CHEWING BUCCAL at 10:52

## 2024-03-29 RX ADMIN — NICOTINE POLACRILEX 4 MG: 4 GUM, CHEWING BUCCAL at 20:05

## 2024-03-29 RX ADMIN — NICOTINE POLACRILEX 4 MG: 4 GUM, CHEWING BUCCAL at 17:38

## 2024-03-29 RX ADMIN — EMPAGLIFLOZIN 10 MG: 10 TABLET, FILM COATED ORAL at 08:11

## 2024-03-29 RX ADMIN — ONDANSETRON 4 MG: 4 TABLET, FILM COATED ORAL at 06:51

## 2024-03-29 RX ADMIN — HYDROXYZINE HYDROCHLORIDE 25 MG: 25 TABLET, FILM COATED ORAL at 15:51

## 2024-03-29 RX ADMIN — PROPRANOLOL HYDROCHLORIDE 10 MG: 10 TABLET ORAL at 08:14

## 2024-03-29 RX ADMIN — DOXYCYCLINE HYCLATE 100 MG: 100 CAPSULE ORAL at 08:11

## 2024-03-29 RX ADMIN — CLINDAMYCIN PHOSPHATE 1 APPLICATOR: 10 LOTION TOPICAL at 20:13

## 2024-03-29 RX ADMIN — NICOTINE POLACRILEX 4 MG: 4 GUM, CHEWING BUCCAL at 15:51

## 2024-03-29 RX ADMIN — CLINDAMYCIN PHOSPHATE: 10 LOTION TOPICAL at 08:15

## 2024-03-29 RX ADMIN — NICOTINE POLACRILEX 4 MG: 4 GUM, CHEWING BUCCAL at 06:55

## 2024-03-29 RX ADMIN — NICOTINE POLACRILEX 4 MG: 4 GUM, CHEWING BUCCAL at 21:21

## 2024-03-29 RX ADMIN — ZINC SULFATE 220 MG (50 MG) CAPSULE 220 MG: CAPSULE at 08:11

## 2024-03-29 RX ADMIN — NICOTINE POLACRILEX 4 MG: 4 GUM, CHEWING BUCCAL at 08:21

## 2024-03-29 RX ADMIN — PROPRANOLOL HYDROCHLORIDE 10 MG: 10 TABLET ORAL at 13:23

## 2024-03-29 RX ADMIN — DEXTROAMPHETAMINE SULFATE, DEXTROAMPHETAMINE SACCHARATE, AMPHETAMINE SULFATE AND AMPHETAMINE ASPARTATE 15 MG: 3.75; 3.75; 3.75; 3.75 CAPSULE, EXTENDED RELEASE ORAL at 12:10

## 2024-03-29 RX ADMIN — NICOTINE 1 PATCH: 21 PATCH, EXTENDED RELEASE TRANSDERMAL at 17:38

## 2024-03-29 RX ADMIN — NICOTINE POLACRILEX 4 MG: 4 GUM, CHEWING BUCCAL at 12:10

## 2024-03-29 RX ADMIN — LITHIUM CARBONATE 900 MG: 450 TABLET, EXTENDED RELEASE ORAL at 20:12

## 2024-03-29 RX ADMIN — AMLODIPINE BESYLATE 10 MG: 10 TABLET ORAL at 08:11

## 2024-03-29 RX ADMIN — TESTOSTERONE 50 MG OF TESTOSTERONE: 50 GEL TRANSDERMAL at 08:11

## 2024-03-29 RX ADMIN — NICOTINE POLACRILEX 4 MG: 4 GUM, CHEWING BUCCAL at 14:41

## 2024-03-29 RX ADMIN — ROSUVASTATIN 40 MG: 20 TABLET, FILM COATED ORAL at 08:11

## 2024-03-29 RX ADMIN — BENZOYL PEROXIDE: 50 LOTION TOPICAL at 08:33

## 2024-03-29 RX ADMIN — NICOTINE POLACRILEX 4 MG: 4 GUM, CHEWING BUCCAL at 09:39

## 2024-03-29 RX ADMIN — Medication 1 G: at 08:11

## 2024-03-29 RX ADMIN — PROPRANOLOL HYDROCHLORIDE 10 MG: 10 TABLET ORAL at 20:12

## 2024-03-29 RX ADMIN — QUETIAPINE FUMARATE 200 MG: 200 TABLET ORAL at 20:12

## 2024-03-29 RX ADMIN — DOXYCYCLINE HYCLATE 100 MG: 100 CAPSULE ORAL at 20:11

## 2024-03-29 RX ADMIN — Medication 500 MG: at 08:11

## 2024-03-29 RX ADMIN — DEUTETRABENAZINE 6 MG: 6 TABLET, COATED ORAL at 08:16

## 2024-03-29 ASSESSMENT — ACTIVITIES OF DAILY LIVING (ADL)
ADLS_ACUITY_SCORE: 54
DRESS: INDEPENDENT
ADLS_ACUITY_SCORE: 54
HYGIENE/GROOMING: INDEPENDENT
HYGIENE/GROOMING: INDEPENDENT
ADLS_ACUITY_SCORE: 54
ORAL_HYGIENE: INDEPENDENT
ADLS_ACUITY_SCORE: 54
LAUNDRY: WITH SUPERVISION
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
## 2024-03-29 NOTE — TELEPHONE ENCOUNTER
RN received refill request from  [facility], [place] - [address] [place] [address] for Gabapentin. Patient is not followed in [facility].  Benjy contacted to inform them. They directed request  be refused, they said they would make a note in pt record.      RN sent reply back to pharmacy that refill request will be DENIED.

## 2024-03-29 NOTE — PLAN OF CARE
"Goal Outcome Evaluation:    Patient continues on SIO 1:1 status this shift d/t Assault Risk and Homicidal Ideation. Presents with a flat affect. Reports mood as \"Anxious.\" Is paranoid, guarded, suspicious, and mistrustful; yet is pleasant, polite, calm, and cooperative. Thought content presents as suspicious, and homicidal d/t command auditory hallucinations telling patient to harm others/kill others. Thought process presents as relevant. Speech is clear and coherent. Eye contact is good. Patient rates anxiety at a #9/10. Requested and received PRN Hydroxyzine x1 for anxiety. Denies suicidal ideation, SIB, and visual hallucinations. Patient endorses auditory command hallucinations to harm/kill others. Jose for safety here in the hospital. No medical issues noted. Vital signs stable. Patient was medication compliant. Ate evening meal. Patient out in the milieu occasionally this shift with SIO 1:1 staff in attendance. No interaction with staff and peers noted.     /79 (BP Location: Left arm, Patient Position: Sitting)   Pulse 69   Temp 98.3  F (36.8  C) (Oral)   Resp 18   Ht 1.676 m (5' 6\")   Wt 100.3 kg (221 lb 3.2 oz)   SpO2 96%   BMI 35.70 kg/m      Supper meal B. HS B.                 "
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
## 2024-03-29 NOTE — PLAN OF CARE
03/29/24 1253   Group Therapy Session   Group Attendance attended group session   Time Session Began 1015   Time Session Ended 1100   Total Time (minutes) 45   Total # Attendees 5   Group Type life skill;task skill;other (see comments)  (OT)   Group Topic Covered balanced lifestyle;coping skills/lifestyle management;leisure exploration/use of leisure time;structured socialization   Group Session Detail OT Clinic: Activity group for creative expression, promoting autonomy, building self worth, coping with stress and symptoms, and an opportunity to exercise cognitive skills (I.e. initiation, planning, organizing, sequencing, sustained attention, follow through, and overall self awareness).   Patient Response/Contribution able to recall/repeat info presented;cooperative with task;expressed understanding of topic   Patient Participation Detail Pt initiated coming to group. Generally quiet and chose to sit by table by door. Did request for materials needed for engagement in activity. Changed to second activity about half way through group. Did respond to questions from staff.

## 2024-03-29 NOTE — PLAN OF CARE
Team Note Due:  Friday    Assessment/Intervention/Current Symtoms and Care Coordination:  Chart review, discussed pt progress, symptomology, and response to treatment.  Discussed the discharge plan and any potential impediments to discharge.    Discharge Plan or Goal:  Return to group and resume w/ outpatient providers.      Barriers to Discharge:  HI threats     Referral Status:  None     Legal Status:  Pt is on a continued MI commitment as of 02/09/2024 until Report Date: 12/14/2024   County: [place]  File Number: [id number]    Contacts:  Psychiatrist/Medication Management:  Name/Organization: Regine Last, CNP, APRN [facility]  Phone: [phone]  Fax: [phone]    Therapist:  Name/Organization: Joshua  [facility] - VAL signed  Phone: [phone]     :  Name/Organization: Cristy Ji  [facility] - VAL signed  Phone: [phone]     Group Home:  Name/Organization: Domenica Group Home Director  [facility] - VAL signed  Phone: [phone]    [facility]:  Name/Organization: Deena Ibarra - VAL signed  Phone: [phone]     CADI Worker:  Name/Organization: Pretty Guzman  [facility] - VAL signed  Phone: [phone]     Upcoming Meetings and Dates/Important Information and next steps:    Pt is a restricted recipient:  Per chart: Insurance Type: Medicaid  Contracted Service Provider: [facility] Provider ID: [id number]     Pt is restricted Provider number [id number], [employer] is a provider for a Restricted Recipient for: Pharmacy    Provider number [id number], JENNIFER LEY is a provider for a Restricted Recipient for: Physician Services ,  Nurse Practitioner Services    Provider number [id number], [facility] is a provider for a Restricted Recipient for: Physician Services ,  Inpatient Hospital ,  Diagnostic Lab ,  Outpatient Hospital    Provider number [id number], [facility]  is a provider for a Restricted   RRP Referral Needed within 90 days of Service
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
## 2024-03-29 NOTE — PROGRESS NOTES
"[facility], [place]   Psychiatric Progress Note        Interim History:     The patient's care was discussed with the treatment team during the daily team meeting and/or staff's chart notes were reviewed.  Staff report patient is a female to 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 admitted to [facility] due to Suicidal ideation and Homicidal thoughts in context of voices telling patient to kill people. Patient was put on SIO as she could not contract for safety, there were no behavioral outbursts. Slept for 7 hours and was compliant with meds and care. Followed by IM for DM management, eye pain and abnormal urinalysis. IM recommended to continue PTA Jardiance, Lantus, Semaglutide. Tylenol was given for eye pain. No treatment for abnormal urinalysis was recommended. For more details, please, see Internal Medicine note. Appreciate medical team's input.  In the evening patient reported still present anxiety and having thoughts about strangling people, though, denied plans to do that, see RN's note below:    \"Pt remains on SIO for assault and urges to strangle people. Pt alert and oriented, forgetful, c/o pain to L. Eye 5/10 that is improving, was given one time tylenol day shift. Pt endorses anxiety 8/10, reports that coloring, beading, music and weighted blanket coping skills works for him. Pt withdraw and isolative to room, sleeping on and off, he's pleasant on approach, flat affect, calm mood, hesitant to make eye contact. Pt reports impulse urges to strangle people, has no plan to act on urges, he contracts for safety. He denied c/o depression and all other mental health symptoms. Pt denied further c/o nausea.  Lithium levels in AM.\"      Met with patient: patient was seen in presence of SIO staff. He went to the conference willingly and talked to me, though, speech was slow and patient didn't appear to be too interested in our visit. He complained of still feeling depressed, still hearing voices telling him " "to harm people, See discussion above. Openly said that he had not noticed any changes in the above voices since admission and would not feel safe at this time to be off SIO. He denied presence of active Suicidal ideation now. Could not identify any major stressor in his life that could, potentially, lead to increase in Auditory hallucinations. Admitted that he was not very compliant with Prolixin Decanoate injections and missed couple of them: \"I forgot about them\". Asked to put him back on Adderall XR that per patient he had been taking for at least one year and unlikely made hallucinations worse: \"I can't function without it\". Requested to find for him a therapist in community prior to discharge. Said that he has good support at his group home and would go back there after discharge. He denied med side effects and had no more questions or concerns.   Li level 3/29/2024 was 1.04.         Medications:      amLODIPine  10 mg Oral Daily    amphetamine-dextroamphetamine  15 mg Oral Daily    benzoyl peroxide   Topical Daily    clindamycin   Topical BID    deutetrabenazine  6 mg Oral Daily    doxycycline hyclate  100 mg Oral BID    empagliflozin  10 mg Oral Daily    fish oil-omega-3 fatty acids  1 g Oral Daily    fluPHENAZine decanoate  25 mg Intramuscular Q14 Days    insulin glargine  25 Units Subcutaneous QAM AC    lithium  900 mg Oral At Bedtime    magnesium gluconate  500 mg Oral Daily    nicotine  1 patch Transdermal Q24H    propranolol  10 mg Oral TID    QUEtiapine  200 mg Oral At Bedtime    rosuvastatin  40 mg Oral Daily    testosterone  50 mg of testosterone Transdermal Daily    zinc sulfate  220 mg Oral Daily          Allergies:     Allergies   Allergen Reactions    Haldol [Haloperidol] Other (See Comments)     Makes patient very angry and anxious    Adhesive Tape Hives    Percocet [Oxycodone-Acetaminophen] Nausea and Vomiting    Prednisone Other (See Comments) and Hives     Suicidal ideation    Risperidone " "Other (See Comments)    Tramadol Hcl Nausea and Vomiting    Droperidol Anxiety    Seroquel [Quetiapine] Palpitations     Spent 2 weeks in the hospital due to having seroquel, caused palpitations and QT prolongation          Labs:     Recent Results (from the past 24 hour(s))   Lithium level    Collection Time: 03/29/24  7:51 AM   Result Value Ref Range    Lithium 1.04 0.60 - 1.20 mmol/L   Glucose by meter    Collection Time: 03/29/24  8:03 AM   Result Value Ref Range    GLUCOSE BY METER POCT 120 (H) 70 - 99 mg/dL          Psychiatric Examination:     /81   Pulse 81   Temp 97.6  F (36.4  C) (Oral)   Resp 18   Ht 1.676 m (5' 6\")   Wt 100.3 kg (221 lb 3.2 oz)   SpO2 98%   BMI 35.70 kg/m    Weight is 221 lbs 3.2 oz  Body mass index is 35.7 kg/m .    Orthostatic Vitals         Most Recent      Sitting Orthostatic /85 03/29 0736    Sitting Orthostatic Pulse (bpm) 76 03/29 0736    Standing Orthostatic /85 03/29 0736    Standing Orthostatic Pulse (bpm) 73 03/29 0736          Appearance: dressed in hospital scrubs and appeared as age stated  Attitude:  cooperative  Eye Contact:  fair  Mood:  anxious and sad   Affect:  mood congruent and intensity is blunted  Speech:   slow, monotonous, not productive  Psychomotor Behavior:  intact station, gait and muscle tone  Throught Process:  linear and goal oriented  Associations:  no loose associations  Thought Content:  passive suicidal ideation present, auditory hallucinations present, and reports homicidal thoughts, see discussion above.  Insight:  partial  Judgement:  limited  Oriented to:  time, person, and place  Attention Span and Concentration:  limited  Recent and Remote Memory:  fair    Clinical Global Impressions  First: 6/4 3/29/2024      Most recent:            Precautions:     Behavioral Orders   Procedures    Code 1 - Restrict to Unit    Routine Programming     As clinically indicated    Status 15     Every 15 minutes.    Status Individual " Observation     Patient SIO status reviewed with team/RN.  Please also refer to RN/team documentation for add'l detail.  SIO is for safety only, staff are not available for socialization and playing games or entertaining pt  -SIO staff to monitor following which have contributed to patient being on SIO:  Homicidal ideation  -Possible interventions SIO staff could use to support patient's treatment progress:  Monitor patient, medication administration  -When following observed, team will review discontinuation of SIO:  When HI resolves     Order Specific Question:   CONTINUOUS 24 hours / day     Answer:   Other     Order Specific Question:   Specify distance     Answer:   10 ft     Order Specific Question:   Indications for SIO     Answer:   Assault risk    Suicide precautions: Suicide Risk: MODERATE     Patients on Suicide Precautions should have a Combination Diet ordered that includes a Diet selection(s) AND a Behavioral Tray selection for Safe Tray - with utensils, or Safe Tray - NO utensils       Order Specific Question:   Suicide Risk     Answer:   MODERATE          DIagnoses:     Schizoaffective disorder bipolar type, current episode depressed, severe, with psychosis  Homicidal ideation  Borderline personality disorder  PTSD  ADHD, per history  Gender Dysphoria   Nicotine use disorder, severe, dependence  Cannabis use disorder, moderate, dependence  Opioid use disorder, moderate in early remission  Amphetamine use disorder, moderate   Ecstacy use disorder, 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 Will continue to provide support and structure, work on discharge planning. Will continue on SIO and No roommate order due to ongoing Auditory hallucinations, Homicidal thoughts, not being able to contract for safety. Will benefit from seeing a counselor after discharge with focus on DBT.    Total time spent was 37 minutes. Over 50% of times was  spent counseling and coordination of care regarding coping skills, medication and discharge planning.

## 2024-03-29 NOTE — PLAN OF CARE
BEH IP Unit Acuity Rating Score (UARS)  Patient is given one point for every criteria they meet.    CRITERIA SCORING   On a 72 hour hold, court hold, committed, stay of commitment, or revocation. 1/1    Patient LOS on BEH unit exceeds 20 days. 0/1  LOS: 2   Patient under guardianship, 55+, otherwise medically complex, or under age 11. 0/1   Suicide ideation without relief of precipitating factors. 0/1   Current plan for suicide. 0/1   Current plan for homicide. 1/1   Imminent risk or actual attempt to seriously harm another without relief of factors precipitating the attempt. 1/1   Severe dysfunction in daily living (ex: complete neglect for self care, extreme disruption in vegetative function, extreme deterioration in social interactions). 0/1   Recent (last 7 days) or current physical aggression in the ED or on unit. 0/1   Restraints or seclusion episode in past 72 hours. 0/1   Recent (last 7 days) or current verbal aggression, agitation, yelling, etc., while in the ED or unit. 0/1   Active psychosis. 1/1   Need for constant or near constant redirection (from leaving, from others, etc).  0/1   Intrusive or disruptive behaviors. 0/1   Patient requires 3 or more hours of individualized nursing care per 8-hour shift (i.e. for ADLs, meds, therapeutic interventions). 0/1   TOTAL 4

## 2024-03-29 NOTE — PLAN OF CARE
Goal Outcome Evaluation:    Problem: Adult Behavioral Health Plan of Care  Goal: Plan of Care Review  Outcome: Progressing  Flowsheets (Taken 3/28/2024 1812)  Patient Agreement with Plan of Care: agrees     Plan of Care Reviewed With: patient      Pt remains on SIO for assault and urges to strangle people. Pt alert and oriented, forgetful, c/o pain to L. Eye 5/10 that is improving, was given one time tylenol day shift. Pt endorses anxiety 8/10, reports that coloring, beading, music and weighted blanket coping skills works for him. Pt withdraw and isolative to room, sleeping on and off, he's pleasant on approach, flat affect, calm mood, hesitant to make eye contact. Pt reports impulse urges to strangle people, has no plan to act on urges, he contracts for safety. He denied c/o depression and all other mental health symptoms. Pt denied further c/o nausea.  Lithium levels in AM.

## 2024-03-30 LAB
ALBUMIN SERPL BCG-MCNC: 4.9 G/DL (ref 3.5–5.2)
ALP SERPL-CCNC: 82 U/L (ref 40–150)
ALT SERPL W P-5'-P-CCNC: 74 U/L (ref 0–70)
AST SERPL W P-5'-P-CCNC: 65 U/L (ref 0–45)
BILIRUB DIRECT SERPL-MCNC: <0.2 MG/DL (ref 0–0.3)
BILIRUB SERPL-MCNC: 0.5 MG/DL
ERYTHROCYTE [DISTWIDTH] IN BLOOD BY AUTOMATED COUNT: 14.6 % (ref 10–15)
GLUCOSE BLDC GLUCOMTR-MCNC: 104 MG/DL (ref 70–99)
GLUCOSE BLDC GLUCOMTR-MCNC: 134 MG/DL (ref 70–99)
GLUCOSE BLDC GLUCOMTR-MCNC: 99 MG/DL (ref 70–99)
HCT VFR BLD AUTO: 48.3 % (ref 35–53)
HGB BLD-MCNC: 15.9 G/DL (ref 13.3–17.7)
MCH RBC QN AUTO: 27.7 PG (ref 26.5–33)
MCHC RBC AUTO-ENTMCNC: 32.9 G/DL (ref 31.5–36.5)
MCV RBC AUTO: 84 FL (ref 78–100)
PLATELET # BLD AUTO: 339 10E3/UL (ref 150–450)
PROT SERPL-MCNC: 8 G/DL (ref 6.4–8.3)
RBC # BLD AUTO: 5.75 10E6/UL (ref 3.8–5.9)
WBC # BLD AUTO: 9.8 10E3/UL (ref 4–11)

## 2024-03-30 PROCEDURE — 250N000013 HC RX MED GY IP 250 OP 250 PS 637: Performed by: EMERGENCY MEDICINE

## 2024-03-30 PROCEDURE — 250N000013 HC RX MED GY IP 250 OP 250 PS 637: Performed by: PSYCHIATRY & NEUROLOGY

## 2024-03-30 PROCEDURE — 80076 HEPATIC FUNCTION PANEL: CPT

## 2024-03-30 PROCEDURE — A9270 NON-COVERED ITEM OR SERVICE: HCPCS | Performed by: NURSE PRACTITIONER

## 2024-03-30 PROCEDURE — 250N000013 HC RX MED GY IP 250 OP 250 PS 637: Performed by: NURSE PRACTITIONER

## 2024-03-30 PROCEDURE — 250N000013 HC RX MED GY IP 250 OP 250 PS 637: Performed by: STUDENT IN AN ORGANIZED HEALTH CARE EDUCATION/TRAINING PROGRAM

## 2024-03-30 PROCEDURE — 250N000011 HC RX IP 250 OP 636: Performed by: PSYCHIATRY & NEUROLOGY

## 2024-03-30 PROCEDURE — 250N000013 HC RX MED GY IP 250 OP 250 PS 637

## 2024-03-30 PROCEDURE — 85027 COMPLETE CBC AUTOMATED: CPT

## 2024-03-30 PROCEDURE — 124N000002 HC R&B MH UMMC

## 2024-03-30 PROCEDURE — 36415 COLL VENOUS BLD VENIPUNCTURE: CPT

## 2024-03-30 RX ADMIN — PROPRANOLOL HYDROCHLORIDE 10 MG: 10 TABLET ORAL at 13:01

## 2024-03-30 RX ADMIN — Medication 1 G: at 08:31

## 2024-03-30 RX ADMIN — NICOTINE POLACRILEX 4 MG: 4 GUM, CHEWING BUCCAL at 07:59

## 2024-03-30 RX ADMIN — ONDANSETRON 4 MG: 4 TABLET, FILM COATED ORAL at 09:09

## 2024-03-30 RX ADMIN — NICOTINE 1 PATCH: 21 PATCH, EXTENDED RELEASE TRANSDERMAL at 17:43

## 2024-03-30 RX ADMIN — NICOTINE POLACRILEX 4 MG: 4 GUM, CHEWING BUCCAL at 09:53

## 2024-03-30 RX ADMIN — CLINDAMYCIN PHOSPHATE: 10 LOTION TOPICAL at 08:34

## 2024-03-30 RX ADMIN — NICOTINE POLACRILEX 4 MG: 4 GUM, CHEWING BUCCAL at 18:32

## 2024-03-30 RX ADMIN — NICOTINE POLACRILEX 4 MG: 4 GUM, CHEWING BUCCAL at 13:01

## 2024-03-30 RX ADMIN — NICOTINE POLACRILEX 4 MG: 4 GUM, CHEWING BUCCAL at 11:33

## 2024-03-30 RX ADMIN — AMLODIPINE BESYLATE 10 MG: 10 TABLET ORAL at 08:30

## 2024-03-30 RX ADMIN — NICOTINE POLACRILEX 4 MG: 4 GUM, CHEWING BUCCAL at 21:30

## 2024-03-30 RX ADMIN — BENZOYL PEROXIDE: 50 LOTION TOPICAL at 08:33

## 2024-03-30 RX ADMIN — DOXYCYCLINE HYCLATE 100 MG: 100 CAPSULE ORAL at 08:30

## 2024-03-30 RX ADMIN — INSULIN GLARGINE 25 UNITS: 100 INJECTION, SOLUTION SUBCUTANEOUS at 08:34

## 2024-03-30 RX ADMIN — ROSUVASTATIN 40 MG: 20 TABLET, FILM COATED ORAL at 08:31

## 2024-03-30 RX ADMIN — DEUTETRABENAZINE 6 MG: 6 TABLET, COATED ORAL at 08:32

## 2024-03-30 RX ADMIN — LITHIUM CARBONATE 900 MG: 450 TABLET, EXTENDED RELEASE ORAL at 20:29

## 2024-03-30 RX ADMIN — DEXTROAMPHETAMINE SULFATE, DEXTROAMPHETAMINE SACCHARATE, AMPHETAMINE SULFATE AND AMPHETAMINE ASPARTATE 15 MG: 3.75; 3.75; 3.75; 3.75 CAPSULE, EXTENDED RELEASE ORAL at 08:30

## 2024-03-30 RX ADMIN — PROPRANOLOL HYDROCHLORIDE 10 MG: 10 TABLET ORAL at 08:30

## 2024-03-30 RX ADMIN — TESTOSTERONE 50 MG OF TESTOSTERONE: 50 GEL TRANSDERMAL at 08:31

## 2024-03-30 RX ADMIN — HYDROXYZINE HYDROCHLORIDE 25 MG: 25 TABLET, FILM COATED ORAL at 16:09

## 2024-03-30 RX ADMIN — NICOTINE POLACRILEX 4 MG: 4 GUM, CHEWING BUCCAL at 15:50

## 2024-03-30 RX ADMIN — PROPRANOLOL HYDROCHLORIDE 10 MG: 10 TABLET ORAL at 20:30

## 2024-03-30 RX ADMIN — NICOTINE POLACRILEX 4 MG: 4 GUM, CHEWING BUCCAL at 14:37

## 2024-03-30 RX ADMIN — EMPAGLIFLOZIN 10 MG: 10 TABLET, FILM COATED ORAL at 08:31

## 2024-03-30 RX ADMIN — NICOTINE POLACRILEX 4 MG: 4 GUM, CHEWING BUCCAL at 19:51

## 2024-03-30 RX ADMIN — OLANZAPINE 10 MG: 10 TABLET, FILM COATED ORAL at 10:50

## 2024-03-30 RX ADMIN — QUETIAPINE FUMARATE 200 MG: 200 TABLET ORAL at 20:29

## 2024-03-30 RX ADMIN — Medication 500 MG: at 08:31

## 2024-03-30 RX ADMIN — ZINC SULFATE 220 MG (50 MG) CAPSULE 220 MG: CAPSULE at 08:30

## 2024-03-30 RX ADMIN — DOXYCYCLINE HYCLATE 100 MG: 100 CAPSULE ORAL at 20:30

## 2024-03-30 RX ADMIN — CLINDAMYCIN PHOSPHATE 1 APPLICATOR: 10 LOTION TOPICAL at 20:30

## 2024-03-30 RX ADMIN — FLUPHENAZINE HYDROCHLORIDE 5 MG: 5 TABLET, FILM COATED ORAL at 11:33

## 2024-03-30 ASSESSMENT — ACTIVITIES OF DAILY LIVING (ADL)
ADLS_ACUITY_SCORE: 54
ADLS_ACUITY_SCORE: 54
ADLS_ACUITY_SCORE: 55
DRESS: INDEPENDENT
ADLS_ACUITY_SCORE: 54
ADLS_ACUITY_SCORE: 55
ADLS_ACUITY_SCORE: 55
HYGIENE/GROOMING: INDEPENDENT
ADLS_ACUITY_SCORE: 55
ORAL_HYGIENE: INDEPENDENT
ADLS_ACUITY_SCORE: 54
ADLS_ACUITY_SCORE: 55
ADLS_ACUITY_SCORE: 54
ADLS_ACUITY_SCORE: 55
ADLS_ACUITY_SCORE: 55
LAUNDRY: WITH SUPERVISION
ADLS_ACUITY_SCORE: 54
ADLS_ACUITY_SCORE: 54
ADLS_ACUITY_SCORE: 55
ADLS_ACUITY_SCORE: 54

## 2024-03-30 NOTE — PLAN OF CARE
Problem: Suicide Risk  Goal: Absence of Self-Harm  Outcome: Not Progressing         Pt has spent this shift making phone calls, watching television and listening to headphones in the lounge. Pt has not been noted to be social with peers or staff, reports to writer this morning during medication administration that he can contract for safety but is experiencing CAH to harm other people, reports he is being told to strangle staff. Pt endorses no intent or urges to act on this but reports that is why he has SIO, denies any SI/SIB/VH at this time.     At 1050 pt asking writer if he could sign a 12 hr intent, reports he feels that he needs to leave so he does not do something impulsive and stupid and hurt someone, pt reassured that team will support him in maintaining safety and encouraged him to speak with provider on Monday, also informed that he is currently under commitment and could not leave AMA at this time, pt accepting of this information, received PRN Zyprexa at this time for CAH. Pt reports disappointment that his Klonopin was discontinued and states that was helpful for him when distressed by hallucinations. Within 30 minutes pt reports to co-RN that he is having CAH to bang his head against the wall and co-RN gave pt PRN prolixin as well as ice to hold against his head, encouraged to use DBT skills which he is familiar with. When writer checked back in with him he reports he is feeling decrease in urges and is able to maintain safety at this time, had visit this afternoon which he reports went well.     Pt has not been noted to be overtly RIS this shift, appears distracted at times. Affect is tense, flat and restricted. Pt reports lack of appetite and nausea, has been asking for Zofran as well as nicotine gum, encouraged to avoid nicotine gum as it may increase nausea, pt has not eaten either breakfast or lunch this shift. Pt denies any other medication SE or pain, continues on SIO for HI and no roommate  order at this time. Will continue to monitor and support plan of care.

## 2024-03-30 NOTE — PLAN OF CARE
Night Nursing Note   3/29/24 - 3/30/24        Prakash was in bed at beginning of shift and appeared asleep.  Monitored on SIO @ 10 ft for assault risk and q15 mins for safety.  Appeared to sleep majority of night without difficulty.  Continue to monitor, offer support and reassurance per care plan.    Slept 7 hrs.

## 2024-03-30 NOTE — PROGRESS NOTES
"Brief medicine note:     Followed up on patients LFT and CBC.     # Elevated ALT and AST, stable  # Fatty liver on imaging  LFTs are stable. Discussed case with pharmacist. Given chronicity of crestor and quetiapine, not convinced this is the etiology. Did have \"marked fatty infiltration of liver\" on previous imaging. No etoh use or tylenol use.   - Follow up with PCP to discuss weight loss plan and consider hepatology follow up  - encourage weight loss for optimal liver function, continue ozempic     # Leukocytosis, resolved    Today's vital signs, medications and nursing notes were reviewed.     /85 (BP Location: Left arm)   Pulse 76   Temp 97.1  F (36.2  C) (Temporal)   Resp 18   Ht 1.676 m (5' 6\")   Wt 100.3 kg (221 lb 3.2 oz)   SpO2 98%   BMI 35.70 kg/m        Medicine will sign off at this time. Follow up with PCP ordered      Jd Martínez PA-C  [facility]  Securely message with the Vocera Web Console (learn more here)  Text page via [facility] Paging/Directory    "

## 2024-03-30 NOTE — PROGRESS NOTES
03/30/24 1607   Group Therapy Session   Group Attendance attended group session   Time Session Began 1115   Time Session Ended 1200   Total Time (minutes) 5   Total # Attendees 5   Group Type life skill;recreation   Group Session Detail Bridge to Self Confidence processing activity for concentration, building self-awareness, insight, coping, reality-based activity, mood stabilization, improving self esteem and expanding social skills.   Patient Participation Detail Pt was pleasant, though appeared to be a bit restless.  Pt remained in group through initial introductions and the group check in question only.  No charge.

## 2024-03-30 NOTE — PLAN OF CARE
"Goal Outcome Evaluation:    Patient continues on SIO 1:1 status this shift d/t Assault Risk and Homicidal Ideation. Presents with a flat affect. Reports mood as \"I had a bit of a rough day, but am doing better now.\" Is guarded; yet is pleasant, polite, calm, and cooperative. Thought content presents as homicidal with some violent thoughts. Thought process presents as relevant. Speech is clear and coherent. Eye contact is good. Patient rates anxiety at a #3-4/10. Requested and received PRN Hydroxyzine x1 for anxiety. Denies suicidal ideation, SIB, and visual hallucinations. Patient endorses auditory command hallucinations to harm/kill others. Jose for safety here in the hospital. No medical issues noted. Vital signs stable. Patient was medication compliant. Ate evening meal. Patient out in the milieu occasionally this shift with SIO 1:1 staff in attendance. No interaction with staff and peers noted.      /83 (BP Location: Left arm, Patient Position: Sitting)   Pulse 67   Temp 98.7  F (37.1  C) (Oral)   Resp 18   Ht 1.676 m (5' 6\")   Wt 100.3 kg (221 lb 3.2 oz)   SpO2 97%   BMI 35.70 kg/m      Supper meal B. HS B.     Patient c/o of lightheadedness and dizziness this shift. Vital signs were obtained and are stable. Patient was encouraged to consume plenty of fluids. No further c/o of lightheadedness and/or dizziness noted. Will continue to monitor.               "

## 2024-03-31 LAB
GLUCOSE BLDC GLUCOMTR-MCNC: 108 MG/DL (ref 70–99)
GLUCOSE BLDC GLUCOMTR-MCNC: 87 MG/DL (ref 70–99)
GLUCOSE BLDC GLUCOMTR-MCNC: 91 MG/DL (ref 70–99)

## 2024-03-31 PROCEDURE — 250N000013 HC RX MED GY IP 250 OP 250 PS 637: Performed by: PSYCHIATRY & NEUROLOGY

## 2024-03-31 PROCEDURE — 250N000013 HC RX MED GY IP 250 OP 250 PS 637: Performed by: PHYSICIAN ASSISTANT

## 2024-03-31 PROCEDURE — 250N000013 HC RX MED GY IP 250 OP 250 PS 637: Performed by: NURSE PRACTITIONER

## 2024-03-31 PROCEDURE — 250N000013 HC RX MED GY IP 250 OP 250 PS 637: Performed by: EMERGENCY MEDICINE

## 2024-03-31 PROCEDURE — 250N000013 HC RX MED GY IP 250 OP 250 PS 637: Performed by: ANESTHESIOLOGY

## 2024-03-31 PROCEDURE — A9270 NON-COVERED ITEM OR SERVICE: HCPCS | Performed by: NURSE PRACTITIONER

## 2024-03-31 PROCEDURE — 124N000002 HC R&B MH UMMC

## 2024-03-31 PROCEDURE — 250N000013 HC RX MED GY IP 250 OP 250 PS 637: Performed by: STUDENT IN AN ORGANIZED HEALTH CARE EDUCATION/TRAINING PROGRAM

## 2024-03-31 PROCEDURE — 250N000013 HC RX MED GY IP 250 OP 250 PS 637

## 2024-03-31 RX ORDER — ACETAMINOPHEN 325 MG/1
650 TABLET ORAL EVERY 4 HOURS PRN
Status: COMPLETED | OUTPATIENT
Start: 2024-03-31 | End: 2024-03-31

## 2024-03-31 RX ADMIN — TESTOSTERONE 50 MG OF TESTOSTERONE: 50 GEL TRANSDERMAL at 08:40

## 2024-03-31 RX ADMIN — HYDROXYZINE HYDROCHLORIDE 25 MG: 25 TABLET, FILM COATED ORAL at 10:06

## 2024-03-31 RX ADMIN — CLINDAMYCIN PHOSPHATE: 10 LOTION TOPICAL at 08:42

## 2024-03-31 RX ADMIN — HYDROXYZINE HYDROCHLORIDE 25 MG: 25 TABLET, FILM COATED ORAL at 22:25

## 2024-03-31 RX ADMIN — NICOTINE POLACRILEX 4 MG: 4 GUM, CHEWING BUCCAL at 22:31

## 2024-03-31 RX ADMIN — DEUTETRABENAZINE 6 MG: 6 TABLET, COATED ORAL at 08:41

## 2024-03-31 RX ADMIN — Medication 500 MG: at 08:41

## 2024-03-31 RX ADMIN — LITHIUM CARBONATE 900 MG: 450 TABLET, EXTENDED RELEASE ORAL at 20:46

## 2024-03-31 RX ADMIN — DEXTROAMPHETAMINE SULFATE, DEXTROAMPHETAMINE SACCHARATE, AMPHETAMINE SULFATE AND AMPHETAMINE ASPARTATE 15 MG: 3.75; 3.75; 3.75; 3.75 CAPSULE, EXTENDED RELEASE ORAL at 08:40

## 2024-03-31 RX ADMIN — NICOTINE 1 PATCH: 21 PATCH, EXTENDED RELEASE TRANSDERMAL at 17:09

## 2024-03-31 RX ADMIN — ROSUVASTATIN 40 MG: 20 TABLET, FILM COATED ORAL at 08:40

## 2024-03-31 RX ADMIN — CLINDAMYCIN PHOSPHATE: 10 LOTION TOPICAL at 20:52

## 2024-03-31 RX ADMIN — TRAZODONE HYDROCHLORIDE 50 MG: 50 TABLET ORAL at 22:25

## 2024-03-31 RX ADMIN — NICOTINE POLACRILEX 4 MG: 4 GUM, CHEWING BUCCAL at 09:00

## 2024-03-31 RX ADMIN — NICOTINE POLACRILEX 4 MG: 4 GUM, CHEWING BUCCAL at 19:14

## 2024-03-31 RX ADMIN — Medication 1 G: at 08:40

## 2024-03-31 RX ADMIN — NICOTINE POLACRILEX 4 MG: 4 GUM, CHEWING BUCCAL at 06:33

## 2024-03-31 RX ADMIN — NICOTINE POLACRILEX 4 MG: 4 GUM, CHEWING BUCCAL at 18:10

## 2024-03-31 RX ADMIN — NICOTINE POLACRILEX 4 MG: 4 GUM, CHEWING BUCCAL at 17:10

## 2024-03-31 RX ADMIN — FLUPHENAZINE HYDROCHLORIDE 5 MG: 5 TABLET, FILM COATED ORAL at 22:31

## 2024-03-31 RX ADMIN — NICOTINE POLACRILEX 4 MG: 4 GUM, CHEWING BUCCAL at 13:28

## 2024-03-31 RX ADMIN — DOXYCYCLINE HYCLATE 100 MG: 100 CAPSULE ORAL at 08:41

## 2024-03-31 RX ADMIN — Medication 25 MG: at 19:14

## 2024-03-31 RX ADMIN — ACETAMINOPHEN 650 MG: 325 TABLET, FILM COATED ORAL at 19:14

## 2024-03-31 RX ADMIN — NICOTINE POLACRILEX 4 MG: 4 GUM, CHEWING BUCCAL at 20:46

## 2024-03-31 RX ADMIN — NICOTINE POLACRILEX 4 MG: 4 GUM, CHEWING BUCCAL at 11:49

## 2024-03-31 RX ADMIN — INSULIN GLARGINE 25 UNITS: 100 INJECTION, SOLUTION SUBCUTANEOUS at 08:41

## 2024-03-31 RX ADMIN — DOXYCYCLINE HYCLATE 100 MG: 100 CAPSULE ORAL at 20:47

## 2024-03-31 RX ADMIN — BENZOYL PEROXIDE: 50 LOTION TOPICAL at 08:43

## 2024-03-31 RX ADMIN — PROPRANOLOL HYDROCHLORIDE 10 MG: 10 TABLET ORAL at 08:40

## 2024-03-31 RX ADMIN — AMLODIPINE BESYLATE 10 MG: 10 TABLET ORAL at 08:41

## 2024-03-31 RX ADMIN — NICOTINE POLACRILEX 4 MG: 4 GUM, CHEWING BUCCAL at 10:06

## 2024-03-31 RX ADMIN — ZINC SULFATE 220 MG (50 MG) CAPSULE 220 MG: CAPSULE at 08:41

## 2024-03-31 RX ADMIN — PROPRANOLOL HYDROCHLORIDE 10 MG: 10 TABLET ORAL at 20:47

## 2024-03-31 RX ADMIN — QUETIAPINE FUMARATE 200 MG: 200 TABLET ORAL at 20:46

## 2024-03-31 RX ADMIN — FLUPHENAZINE HYDROCHLORIDE 5 MG: 5 TABLET, FILM COATED ORAL at 12:19

## 2024-03-31 RX ADMIN — PROPRANOLOL HYDROCHLORIDE 10 MG: 10 TABLET ORAL at 13:36

## 2024-03-31 RX ADMIN — NICOTINE POLACRILEX 4 MG: 4 GUM, CHEWING BUCCAL at 08:00

## 2024-03-31 RX ADMIN — EMPAGLIFLOZIN 10 MG: 10 TABLET, FILM COATED ORAL at 08:40

## 2024-03-31 ASSESSMENT — ACTIVITIES OF DAILY LIVING (ADL)
ADLS_ACUITY_SCORE: 55
ADLS_ACUITY_SCORE: 55
DRESS: INDEPENDENT
ADLS_ACUITY_SCORE: 55
ORAL_HYGIENE: INDEPENDENT
ADLS_ACUITY_SCORE: 55
HYGIENE/GROOMING: INDEPENDENT
ADLS_ACUITY_SCORE: 55

## 2024-03-31 NOTE — PLAN OF CARE
Pt appeared to sleep for a total of 6.5 hours overnight. No PRN medications were administered, and no concerns were noted. 1:1 SIO (10ft) continues, as pt continues to endorse active homicidal ideation and command AH to harm others.      Problem: Sleep Disturbance  Goal: Adequate Sleep/Rest  Outcome: Progressing

## 2024-03-31 NOTE — PROGRESS NOTES
CLINICAL NUTRITION SERVICES - BRIEF NOTE     Nutrition Prescription    RECOMMENDATIONS FOR MDs/PROVIDERS TO ORDER:  Please notify nutrition department if wanting to change the supplement plan.     Recommendations already ordered by Registered Dietitian (RD):  Ensure Enlive with all meals    Future/Additional Recommendations:  None, RD to sign off     REASON FOR ASSESSMENT  Prakash Prasad is a/an 33 year old adult assessed by the dietitian for Patient/Family Request - pt with poor intakes, reports liquids would be easier to tolerate, interested in Ensure.    Findings  Per discussion with RN over phone, pt only eating one meal but would be open to Ensure. Pt with no flavor preference.     INTERVENTIONS  Implementation  Ensure Enlive with all meals    Follow up/Monitoring  No nutrition follow-up warranted at this time. RD to sign off. Please consult if further needs arise    Emily Blackburn MS, RDN, LD  Clinical Dietitian  Available via [employer]  Weekend/Holiday Vocera: Weekend Holiday Clinical Dietitian [Multi Site Groups]

## 2024-03-31 NOTE — PLAN OF CARE
Problem: Adult Behavioral Health Plan of Care  Goal: Develops/Participates in Therapeutic [place] to Support Successful Transition  Outcome: Progressing         Pt has spent this shift watching television on the periphery of the milieu, pacing in the hallways and making phone calls. Pt had a visit from friend this shift, appears flat and tense at times but has been thanking writer several times for assistance, seeks staff fairly frequently. Pt did not eat breakfast or lunch this shift, reports nausea and lack of appetite is a result of increased anxiety and mental health symptoms, did not request any PRN Zofran this shift but did request PRN hydroxyzine for anxiety. Pt was given Ensure at lunch but reports it made him want to throw up and he declined to drink it, when visitor arrived pt asking for Mountain Dew in his locker, informed that he does not have an order for this and pt asked for a one time exception but was reminded that due to the sugar and caffeine this would need to be discussed with team and provider tomorrow, appeared to accept this. Pt reports feeling that Prolixin is more beneficial to him when feeling agitated and bothered by CAH compared to Zyprexa, during check in pt reports his CAH severity were at a 9/10 and he should probably ask for PRN when he is feeling like a 5/10, agreed to take PRN prolixin at this time which he reports provided some relief. Pt states mornings tend to be harder for him, encouraged to speak with provider about this tomorrow and how to alleviate some of the increased anxiety and symptoms in the morning. Pt reports the only anxiety medications that work for him are benzos, asked writer to call on call for a one time order, writer informed him that this would be unlikely and questioned what other medications are helpful, agreed that he has PRN seroquel at home which is beneficial at times, on call contacted and PRN seroquel order obtained, pt informed. Pt endorses  "wanting to bang his head at times to make the voices stop but states \"I wouldn't actually do it.\" Pt denies any other SI/SIB/VH at this time. Pt endorses CAH which tell him to harm other people but denies having any intent or wanting to act on these hallucinations, asked writer to go through and fill out a worksheet on anxiety with him, this was copied and placed in front of chart. Pt has not engaged in any aggressive or threatening behavior but continues on SIO at 10 for HI thoughts, continues to have no roommate order due to HI as well. Pt denies any medication SE or pain, will continue to monitor and support plan of care.                "

## 2024-04-01 LAB
GLUCOSE BLDC GLUCOMTR-MCNC: 121 MG/DL (ref 70–99)
GLUCOSE BLDC GLUCOMTR-MCNC: 83 MG/DL (ref 70–99)
GLUCOSE BLDC GLUCOMTR-MCNC: 91 MG/DL (ref 70–99)
GLUCOSE BLDC GLUCOMTR-MCNC: 92 MG/DL (ref 70–99)

## 2024-04-01 PROCEDURE — 250N000013 HC RX MED GY IP 250 OP 250 PS 637

## 2024-04-01 PROCEDURE — 250N000013 HC RX MED GY IP 250 OP 250 PS 637: Performed by: PSYCHIATRY & NEUROLOGY

## 2024-04-01 PROCEDURE — 250N000011 HC RX IP 250 OP 636: Performed by: PSYCHIATRY & NEUROLOGY

## 2024-04-01 PROCEDURE — 250N000013 HC RX MED GY IP 250 OP 250 PS 637: Performed by: EMERGENCY MEDICINE

## 2024-04-01 PROCEDURE — A9270 NON-COVERED ITEM OR SERVICE: HCPCS | Performed by: NURSE PRACTITIONER

## 2024-04-01 PROCEDURE — 250N000013 HC RX MED GY IP 250 OP 250 PS 637: Performed by: STUDENT IN AN ORGANIZED HEALTH CARE EDUCATION/TRAINING PROGRAM

## 2024-04-01 PROCEDURE — 99233 SBSQ HOSP IP/OBS HIGH 50: CPT | Performed by: PSYCHIATRY & NEUROLOGY

## 2024-04-01 PROCEDURE — 250N000013 HC RX MED GY IP 250 OP 250 PS 637: Performed by: NURSE PRACTITIONER

## 2024-04-01 PROCEDURE — 90832 PSYTX W PT 30 MINUTES: CPT

## 2024-04-01 PROCEDURE — 124N000002 HC R&B MH UMMC

## 2024-04-01 RX ORDER — QUETIAPINE FUMARATE 50 MG/1
50 TABLET, FILM COATED ORAL 2 TIMES DAILY
Status: DISCONTINUED | OUTPATIENT
Start: 2024-04-01 | End: 2024-04-12 | Stop reason: HOSPADM

## 2024-04-01 RX ORDER — CLONAZEPAM 0.5 MG/1
0.25 TABLET ORAL DAILY PRN
Status: DISCONTINUED | OUTPATIENT
Start: 2024-04-01 | End: 2024-04-10

## 2024-04-01 RX ORDER — ARIPIPRAZOLE 2 MG/1
2 TABLET ORAL EVERY MORNING
Status: DISCONTINUED | OUTPATIENT
Start: 2024-04-01 | End: 2024-04-03

## 2024-04-01 RX ADMIN — NICOTINE POLACRILEX 4 MG: 4 GUM, CHEWING BUCCAL at 13:32

## 2024-04-01 RX ADMIN — INSULIN GLARGINE 25 UNITS: 100 INJECTION, SOLUTION SUBCUTANEOUS at 08:03

## 2024-04-01 RX ADMIN — PROPRANOLOL HYDROCHLORIDE 10 MG: 10 TABLET ORAL at 13:32

## 2024-04-01 RX ADMIN — ZINC SULFATE 220 MG (50 MG) CAPSULE 220 MG: CAPSULE at 08:02

## 2024-04-01 RX ADMIN — FLUPHENAZINE HYDROCHLORIDE 5 MG: 5 TABLET, FILM COATED ORAL at 08:02

## 2024-04-01 RX ADMIN — NICOTINE POLACRILEX 4 MG: 4 GUM, CHEWING BUCCAL at 20:05

## 2024-04-01 RX ADMIN — Medication 1 G: at 08:02

## 2024-04-01 RX ADMIN — FLUPHENAZINE HYDROCHLORIDE 5 MG: 5 TABLET, FILM COATED ORAL at 14:27

## 2024-04-01 RX ADMIN — PROPRANOLOL HYDROCHLORIDE 10 MG: 10 TABLET ORAL at 19:30

## 2024-04-01 RX ADMIN — CLINDAMYCIN PHOSPHATE: 10 LOTION TOPICAL at 20:25

## 2024-04-01 RX ADMIN — NICOTINE POLACRILEX 4 MG: 4 GUM, CHEWING BUCCAL at 21:30

## 2024-04-01 RX ADMIN — NICOTINE POLACRILEX 4 MG: 4 GUM, CHEWING BUCCAL at 08:02

## 2024-04-01 RX ADMIN — PROPRANOLOL HYDROCHLORIDE 10 MG: 10 TABLET ORAL at 08:02

## 2024-04-01 RX ADMIN — ARIPIPRAZOLE 2 MG: 2 TABLET ORAL at 13:34

## 2024-04-01 RX ADMIN — LITHIUM CARBONATE 900 MG: 450 TABLET, EXTENDED RELEASE ORAL at 20:24

## 2024-04-01 RX ADMIN — OLANZAPINE 10 MG: 10 TABLET, FILM COATED ORAL at 11:31

## 2024-04-01 RX ADMIN — CLONAZEPAM 0.25 MG: 0.5 TABLET ORAL at 16:26

## 2024-04-01 RX ADMIN — TESTOSTERONE 50 MG OF TESTOSTERONE: 50 GEL TRANSDERMAL at 08:03

## 2024-04-01 RX ADMIN — NICOTINE POLACRILEX 4 MG: 4 GUM, CHEWING BUCCAL at 12:07

## 2024-04-01 RX ADMIN — NICOTINE POLACRILEX 4 MG: 4 GUM, CHEWING BUCCAL at 09:56

## 2024-04-01 RX ADMIN — NICOTINE POLACRILEX 4 MG: 4 GUM, CHEWING BUCCAL at 17:54

## 2024-04-01 RX ADMIN — DEUTETRABENAZINE 6 MG: 6 TABLET, COATED ORAL at 08:04

## 2024-04-01 RX ADMIN — QUETIAPINE FUMARATE 200 MG: 200 TABLET ORAL at 20:30

## 2024-04-01 RX ADMIN — ROSUVASTATIN 40 MG: 20 TABLET, FILM COATED ORAL at 08:02

## 2024-04-01 RX ADMIN — ONDANSETRON 4 MG: 4 TABLET, FILM COATED ORAL at 09:56

## 2024-04-01 RX ADMIN — DOXYCYCLINE HYCLATE 100 MG: 100 CAPSULE ORAL at 08:02

## 2024-04-01 RX ADMIN — NICOTINE POLACRILEX 4 MG: 4 GUM, CHEWING BUCCAL at 15:25

## 2024-04-01 RX ADMIN — Medication 500 MG: at 08:02

## 2024-04-01 RX ADMIN — NICOTINE POLACRILEX 4 MG: 4 GUM, CHEWING BUCCAL at 10:54

## 2024-04-01 RX ADMIN — NICOTINE POLACRILEX 4 MG: 4 GUM, CHEWING BUCCAL at 16:26

## 2024-04-01 RX ADMIN — QUETIAPINE FUMARATE 50 MG: 50 TABLET ORAL at 20:29

## 2024-04-01 RX ADMIN — DEXTROAMPHETAMINE SULFATE, DEXTROAMPHETAMINE SACCHARATE, AMPHETAMINE SULFATE AND AMPHETAMINE ASPARTATE 15 MG: 3.75; 3.75; 3.75; 3.75 CAPSULE, EXTENDED RELEASE ORAL at 08:02

## 2024-04-01 RX ADMIN — NICOTINE 1 PATCH: 21 PATCH, EXTENDED RELEASE TRANSDERMAL at 12:07

## 2024-04-01 RX ADMIN — EMPAGLIFLOZIN 10 MG: 10 TABLET, FILM COATED ORAL at 08:02

## 2024-04-01 RX ADMIN — DOXYCYCLINE HYCLATE 100 MG: 100 CAPSULE ORAL at 19:30

## 2024-04-01 RX ADMIN — AMLODIPINE BESYLATE 10 MG: 10 TABLET ORAL at 08:02

## 2024-04-01 ASSESSMENT — ACTIVITIES OF DAILY LIVING (ADL)
DRESS: INDEPENDENT
ADLS_ACUITY_SCORE: 55
HYGIENE/GROOMING: INDEPENDENT
LAUNDRY: WITH SUPERVISION
ADLS_ACUITY_SCORE: 55
ORAL_HYGIENE: INDEPENDENT
ADLS_ACUITY_SCORE: 55

## 2024-04-01 NOTE — PLAN OF CARE
04/01/24 1444   Group Therapy Session   Group Attendance attended group session   Time Session Began 1015   Time Session Ended 1100   Total Time (minutes) 25 -  no charge   Total # Attendees 6   Group Type life skill;task skill;other (see comments)  (OT)   Group Topic Covered balanced lifestyle;coping skills/lifestyle management;leisure exploration/use of leisure time;structured socialization   Group Session Detail OT Clinic: Activity group for creative expression, promoting autonomy, building self worth, coping with stress and symptoms, and an opportunity to exercise cognitive skills (I.e. initiation, planning, organizing, sequencing, sustained attention, follow through, and overall self awareness).   Patient Response/Contribution able to recall/repeat info presented;cooperative with task;discussed personal experience with topic;expressed understanding of topic   Patient Participation Detail Pt initiated coming to group and did request to engage in his structured creative distraction activity. He asked the SIO staff if they were going to engage in some activity and then focused on his activity for about 25 minutes. He then chose to end, noting he needed to take a break. Staff discussed benefits of knowing limits of engagement in an activity and when to change it or take a break for management. He did respond to other patient's greeting at beginning of group.

## 2024-04-01 NOTE — PLAN OF CARE
"  Problem: Anxiety Signs/Symptoms  Goal: Improved Mood Symptoms (Anxiety Signs/Symptoms)  Outcome: Not Progressing          Pt has had a difficult shift, reports active SI/HI thoughts and states last night was hard for him and he was too scared to tell staff. Pt states that last night he didn't care if he \"killed himself, someone else, caused a code,\" pt reports he attempted to use STOP DBT skills but was unable to accomplish anything and chose to scratch his hand because the feeling was so calming and pleasant for him. Pt reports embarrassment and shame for being in the hospital so many times this year, reports he feel guilty and hopeless. Writer and pt discussed how pt has significant periods of stability where he has no SI/HI thoughts and his baseline mood is fairly neutral, encouraged to focus on this and take the bad days one at a time, pt agrees that it feels like he will feel like this forever but it always gets better. Pt discussed several items he wants to discuss with provider, during initial meeting with provider pt abruptly cut it short and exited due to high anxiety but agreed to meet again later in the afternoon and was successful in advocating for himself during second meeting. Pt reports he feels like he can come off SIO today and plans to do this tomorrow during conversation with provider. Pt then asked to speak with writer privately shortly after meeting with provider and disclosed that during meeting with provider he was having thoughts to grab provider's pen and stab him with it, reports he was shaking his head during meeting because he was trying to ignore CAH telling him to engage in physical violence against provider.     Pt continues to endorse thoughts of SI/SIB/HI with CAH telling him to hurt himself and others. Pt denies VH at this time, reports high anxiety and has been utilizing several PRN medications this shift. Pt has been frequently asking for writer this shift and reporting no " "improvement in mood or mental health symptoms despite PRN prolixin x 2, Zyprexa x 1 and being started on Abilify this shift. Pt has not been noted to be talking or laughing to self, does appear anxious and tense, eye contact poor. Pt has not eaten breakfast or lunch this shift and reports his anxiety is making his stomach upset and he has a poor appetite, received PRN Zofran x 1.        Pt had meeting with TCM on the unit, legal status discussed and pt became agitated, asked for PRN from writer and stated \"I don't want to talk to anyone, I don't want any meds, I'm pissed and I don't want to hear it\" and proceeded to his room. Writer and SIO approached and pt able to calm slightly, agreeable to PRN medication and returned to meeting with .    Pt continues on SIO for HI/SI, continues to be inappropriate for a roommate at this time.       1500-Pt placed on hold at 1449, presented with copy of hold and rights by writer.            "

## 2024-04-01 NOTE — PLAN OF CARE
Team Note Due:  Friday    Assessment/Intervention/Current Symtoms and Care Coordination:  Chart review, discussed pt progress, symptomology, and response to treatment.  Discussed the discharge plan and any potential impediments to discharge.    Writer was notified by pt's nurse that his CM Cristy is coming to visit this afternoon at 2pm.    Pt met with CM. CM shared she was not notified by the ED regarding pt being placed on a 72HH initially, so the PD has not yet been revoked. She requested provider place pt on a 72HH as soon as possible and she will work on the Ex Parte order. Received correct phone number for CM ([phone]) as previously noted number was for a different CM not assigned to this pt. CM shared pt has a virtual intake via Teams on 4/8 with a [facility] program and requested contact info for primary CTC so this could be accommodated.     Provider notified. 72HH placed at 1448.    Discharge Plan or Goal:  Return to group and resume w/ outpatient providers.      Barriers to Discharge:  HI threats     Referral Status:  None     Legal Status:  72HH (hold placed 4/1 at 1448 ). Waiting on revoked PD from CM.  County: [place]  File Number: [id number]    Contacts:  Psychiatrist/Medication Management:  Name/Organization: Regine Last CNP, APRN [facility]  Phone: [phone]  Fax: [phone]    Therapist:  Name/Organization: Joshua  [facility] [place] [facility] - VAL signed  Phone: [phone]     :  Name/Organization: Cristy Ji  [facility] - [place] signed  Phone: [phone]    Group Home:  Name/Organization: Domenica [facility] Director  [facility] - VAL signed  Phone: [phone]    [facility]:  Name/Organization: Deena Ibarra - [place] signed  Phone: [phone]     CADI Worker:  Name/Organization: Pretty Guzman  [facility] - [place] signed  Phone: [phone]     Upcoming Meetings and Dates/Important Information and next steps:  CTC to follow up with  CM regarding revoked PD      Pt is a restricted recipient:  Per chart: Insurance Type: Medicaid  [facility]: [id number]     Pt is restricted Provider number [id number], [employer] is a provider for a Restricted Recipient for: Pharmacy    Provider number [id number], JENNIFER LEY is a provider for a Restricted Recipient for: Physician Services ,  Nurse Practitioner Services    Provider number [id number], [facility] is a provider for a Restricted Recipient for: Physician Services ,  Inpatient Hospital ,  Diagnostic Lab ,  Outpatient Hospital    Provider number [id number], [facility] is a provider for a Restricted   RRP Referral Needed within 90 days of Service

## 2024-04-01 NOTE — PLAN OF CARE
BEH IP Unit Acuity Rating Score (UARS)  Patient is given one point for every criteria they meet.    CRITERIA SCORING   On a 72 hour hold, court hold, committed, stay of commitment, or revocation. 1/1    Patient LOS on BEH unit exceeds 20 days. 0/1  LOS: 5   Patient under guardianship, 55+, otherwise medically complex, or under age 11. 0/1   Suicide ideation without relief of precipitating factors. 0/1   Current plan for suicide. 0/1   Current plan for homicide. 1/1   Imminent risk or actual attempt to seriously harm another without relief of factors precipitating the attempt. 1/1   Severe dysfunction in daily living (ex: complete neglect for self care, extreme disruption in vegetative function, extreme deterioration in social interactions). 0/1   Recent (last 7 days) or current physical aggression in the ED or on unit. 0/1   Restraints or seclusion episode in past 72 hours. 0/1   Recent (last 7 days) or current verbal aggression, agitation, yelling, etc., while in the ED or unit. 0/1   Active psychosis. 1/1   Need for constant or near constant redirection (from leaving, from others, etc).  0/1   Intrusive or disruptive behaviors. 0/1   Patient requires 3 or more hours of individualized nursing care per 8-hour shift (i.e. for ADLs, meds, therapeutic interventions). 0/1   TOTAL 4

## 2024-04-01 NOTE — PLAN OF CARE
"Goal Outcome Evaluation:    Individual Therapy Note      Date of Service: April 1, 2024    Patient: Prakash goes by \"Prakash,\" uses they/them pronouns    Individuals Present: NATHAN Mott    Session start: 1:00 pm  Session end: 1:45 pm  Session duration in minutes: 45    Patient Active Problem List   Diagnosis    ADHD (attention deficit hyperactivity disorder)    Bipolar 1 disorder, manic, mild    Marijuana abuse    Polysubstance abuse (H)    GERD (gastroesophageal reflux disease)    Tobacco abuse    Intractable back pain    Optic neuritis    Cauda equina syndrome with neurogenic bladder (H)    Schizoaffective disorder, bipolar type (H)    PTSD (post-traumatic stress disorder)    Anxiety    Auditory hallucination    Nephrolithiasis    Cyst of left ovary    Borderline personality disorder (H)    Cannabis use disorder, severe, dependence (H)    Depression    Episodic mood disorder (H24)    History of heroin abuse (H)    Moderate episode of recurrent major depressive disorder (H)    Opioid use disorder, severe, dependence (H)    Substance-induced psychotic disorder with hallucinations (H)    Nausea    Urinary retention    Chronic bilateral low back pain without sciatica    AVA (generalized anxiety disorder)    Aggressive behavior    Gender identity disorder    Lumbosacral radiculopathy at L5    DUB (dysfunctional uterine bleeding)    Seizure-like activity (H)    Acanthosis nigricans    Schizoaffective disorder, chronic condition with acute exacerbation (H)    Bipolar affective disorder, mixed, severe, with psychotic behavior (H)    Schizophrenia, schizoaffective, chronic with acute exacerbation (H)    Akathisia    Hypertension, unspecified type    Female-to-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 person    Morbid obesity (H)    Diabetes mellitus, type 2 (H)    Mood disorder due to a general medical condition    Pain of right hand    Acne vulgaris         Modality Used:DBT, Person Centered, Rapport Building, Motivational " "Interviewing, and ACT    Goals: regulate emotions, work on reducing intrusive negative thoughts, have self compassion for self as thoughts cause shame    Patient Description of current symptoms: intrusive violent thoughts, as a non violent person, the thoughts sound OCD in nature. Pt has expressed whenever they make art, they are distracted and engaged and the thoughts pause. Writer and pt did Art Therapy in this session, learning naman mindful drawing technique and pt teaching writer about the benefits of \" junk journaling>\"     Mental Status Exam:   Attitude: cooperative and guarded  Eye Contact: fair  Mood: anxious  Affect: intensity is blunted, intensity is flat, and intensity is exaggerated  Speech: paucity of speech  Psychomotor Behavior: no evidence of tardive dyskinesia, dystonia, or tics  Thought Process:  goal oriented  Associations: no loose associations  Thought Content: thoughts of self-harm, which are decreased and auditory hallucinations present  Insight: limited  Judgement: poor  Attention Span and Concentration: fair    Pt progress: Pt is asking for help from staff, they asked to meet with writer when the intrusive thoughts became difficult today. Nursing also helped by sharing TIPP skills with them. Writer taught leaves on a stream meditation to deal with unwanted thoughts and worries, they found it effective and helpful    Treatment Objective(s) Addressed:   The focus of this session was on rapport building, orienting the patient to therapy, identifying and practicing coping strategies, building distress tolerance, anger management, identifying treatment goals, building self-esteem, and building skills in distress tolerance and grounding techniques      Progress Towards Goals and Assessment of Patient:   Patient is making progress towards treatment goals as evidenced by willingness to engage in therapies and try new skills to improve symptoms.       Therapeutic Intervention(s):   Provided " active listening, unconditional positive regard, and validation. Coached on coping techniques/relaxation skills to help improve distress tolerance and managing intense emotions. Provided positive reinforcement for progress towards goals, gains in knowledge, and application of skills previously taught.  Identified and practiced coping skills. Engaged in relaxation training (e.g. meditation, progressive muscle relaxation, etc.). Identified stress relief practices. Explored strategies for self-soothing.  Discussed TIP (body temperature, intense exercise, PMR). Discussed and practiced mindfulness.     Plan/next step: meet tomorrow and check in on Art coping skills they will use on the uni this evening in milieu    13104 - Psychotherapy (with patient) - 45 (38-52*) min

## 2024-04-01 NOTE — PLAN OF CARE
Problem: Adult Behavioral Health Plan of Care  Goal: Plan of Care Review  Outcome: Progressing  Flowsheets (Taken 3/31/2024 1903)  Patient Agreement with Plan of Care: agrees   Goal Outcome Evaluation:    Pt remains on SIO for HI thoughts. Pt alert and oriented, c/o pounding headaches 7/10, pt liver enzymes slightly elevated, tylenol order held by provider, on-call provider Sangeeta madrid and unheld tylenol order, pt took 650 mg and stated was effective. Pt endorses anxiety 5/10, requested Seroquel and reported effectiveness. Pt withdraw and isolative to room, sleeping on and off, he's pleasant on approach, flat affect, calm mood, hesitant to make eye contact. Pt reports impulse urges to harm people but denies plans, he contracts for safety. He denied c/o depression and all other mental health symptoms. Pt denied c/o nausea, ate pizza for dinner with good appetite. Utilized nicotine gum frequently this shift.    Pt came out @2225, his L. outer hand with scant blood, pt reports scratching hand d/t anxiety caused by auditory hallucinations. Hand was cleaned, tiny superficial abrasions noted to skin, Band-Aid applied, pt requested sleeping medication, hydroxyzine and prolixin. Pt went back to sleep, he contracted for safety.

## 2024-04-01 NOTE — PROGRESS NOTES
"[facility], [place]   Psychiatric Progress Note        Interim History:     The patient's care was discussed with the treatment team during the daily team meeting and/or staff's chart notes were reviewed.  Staff report patient slept for 7 hours last night. She continued over weekend to be on SIO and no roommate order due to ongoing Suicidal ideation and Homicidal thoughts and reported to continue to hear Auditory hallucinations telling her to harm self and people. Was compliant with meds. Subjective, per staff report patient didn't appear to be responding to internal stimuli. Visited with CM today. Per CTC CM felt that PD needed to be revoked and requested to put patient on a 72 hour hold, see CTC's note below:    \"Pt met with CM. CM shared she was not notified by the ED regarding pt being placed on a 72HH initially, so the PD has not yet been revoked. She requested provider place pt on a 72HH as soon as possible and she will work on the Ex Parte order. Received correct phone number for CM ([phone]) as previously noted number was for a different CM not assigned to this pt. CM shared pt has a virtual intake via Teams on 4/8 with a [facility] program and requested contact info for primary CTC so this could be accommodated.\"     Met with patient: patient was seen in the conference room on 2 occasions, both times in presence of RN and SIO staff. First time patient was able to stay inside of the conference room for only couple of minutes, murmured: \"I can't do that\" and walked out. I approached Prakash in a few minutes and he agreed to meet again. Our 2nd visit was more productive. Patient reported going through quite a lot of anxiety, asked to restart Klonopin, agreed with recommendation to continue it as a prn med. We talked about removing SIO. Prakash said that could do it any time we wanted, but when we asked him to confirm if he was ready for that, he said that he still had frequent thoughts " of harming self and people. We agreed to wait with stopping SIO for at least one more day. He asked about getting snacks and pop from his locker. We suggested him not to do that because of his diabetes. He agreed with us. Prakash was able to stay until the end of our meeting and, overall, was cooperative, but after our meeting told RN that he had urges to grab my pen and stick me with it because of command voices telling him to do that.         Medications:      amLODIPine  10 mg Oral Daily    amphetamine-dextroamphetamine  15 mg Oral Daily    ARIPiprazole  2 mg Oral QAM    benzoyl peroxide   Topical Daily    clindamycin   Topical BID    deutetrabenazine  6 mg Oral Daily    doxycycline hyclate  100 mg Oral BID    empagliflozin  10 mg Oral Daily    fish oil-omega-3 fatty acids  1 g Oral Daily    fluPHENAZine decanoate  25 mg Intramuscular Q14 Days    insulin glargine  25 Units Subcutaneous QAM AC    lithium  900 mg Oral At Bedtime    magnesium gluconate  500 mg Oral Daily    nicotine  1 patch Transdermal Q24H    propranolol  10 mg Oral TID    QUEtiapine  200 mg Oral At Bedtime    rosuvastatin  40 mg Oral Daily    Semaglutide  7 mg Oral Daily    testosterone  50 mg of testosterone Transdermal Daily    zinc sulfate  220 mg Oral Daily          Allergies:     Allergies   Allergen Reactions    Haldol [Haloperidol] Other (See Comments)     Makes patient very angry and anxious    Adhesive Tape Hives    Percocet [Oxycodone-Acetaminophen] Nausea and Vomiting    Prednisone Other (See Comments) and Hives     Suicidal ideation    Risperidone Other (See Comments)    Tramadol Hcl Nausea and Vomiting    Droperidol Anxiety    Seroquel [Quetiapine] Palpitations     Spent 2 weeks in the hospital due to having seroquel, caused palpitations and QT prolongation          Labs:     Recent Results (from the past 24 hour(s))   Glucose by meter    Collection Time: 03/31/24  5:13 PM   Result Value Ref Range    GLUCOSE BY METER POCT 87 70 - 99  "mg/dL   Glucose by meter    Collection Time: 03/31/24  8:45 PM   Result Value Ref Range    GLUCOSE BY METER POCT 91 70 - 99 mg/dL   Glucose by meter    Collection Time: 04/01/24  7:53 AM   Result Value Ref Range    GLUCOSE BY METER POCT 121 (H) 70 - 99 mg/dL          Psychiatric Examination:     /75   Pulse 74   Temp 98.7  F (37.1  C) (Oral)   Resp 18   Ht 1.676 m (5' 6\")   Wt 101 kg (222 lb 9.6 oz)   SpO2 97%   BMI 35.93 kg/m    Weight is 222 lbs 9.6 oz  Body mass index is 35.93 kg/m .    Orthostatic Vitals         Most Recent      Standing Orthostatic /86 04/01 0840    Standing Orthostatic Pulse (bpm) 813 04/01 0840           Appearance: dressed in hospital scrubs and moderately obese  Attitude:  somewhat cooperative  Eye Contact:  poor   Mood:  anxious, sad , and irritable  Affect:  constricted mobility  Speech:  decreased prosody and paucity of speech  Psychomotor Behavior:  no evidence of tardive dyskinesia, dystonia, or tics  Throught Process:  linear and goal oriented  Associations:  no loose associations  Thought Content:  passive suicidal ideation present, auditory hallucinations present, and reports thoughts of harming self and others  Insight:  limited  Judgement:  poor  Oriented to:  time, person, and place  Attention Span and Concentration:  limited  Recent and Remote Memory:  limited    Clinical Global Impressions  First: 7/4 4/1/2024      Most recent:            Precautions:     Behavioral Orders   Procedures    Assault precautions    Code 1 - Restrict to Unit    Routine Programming     As clinically indicated    Status 15     Every 15 minutes.    Status Individual Observation     Patient SIO status reviewed with team/RN.  Please also refer to RN/team documentation for add'l detail.  SIO is for safety only, staff are not available for socialization and playing games or entertaining pt  -SIO staff to monitor following which have contributed to patient being on SIO:  Homicidal " ideation  -Possible interventions SIO staff could use to support patient's treatment progress:  Monitor patient, medication administration  -When following observed, team will review discontinuation of SIO:  When HI resolves     Order Specific Question:   CONTINUOUS 24 hours / day     Answer:   Other     Order Specific Question:   Specify distance     Answer:   10 ft     Order Specific Question:   Indications for SIO     Answer:   Assault risk    Suicide precautions: Suicide Risk: MODERATE     Patients on Suicide Precautions should have a Combination Diet ordered that includes a Diet selection(s) AND a Behavioral Tray selection for Safe Tray - with utensils, or Safe Tray - NO utensils       Order Specific Question:   Suicide Risk     Answer:   MODERATE          DIagnoses:     Schizoaffective disorder bipolar type, current episode depressed, severe, with psychosis  Homicidal ideation  Borderline personality disorder  PTSD  ADHD, per history  Gender Dysphoria   Nicotine use disorder, severe, dependence  Cannabis use disorder, moderate, dependence  Opioid use disorder, moderate in early remission  Amphetamine use disorder, moderate   Ecstacy use disorder, 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 Some of it appears to be attention seeking, not of a genuine psychotic process. Patient seems to be tolerating well Seroquel qhs. Will start on low dose of day time Seroquel as of 4/1/2024. Will continue to provide support and structure.      Total time spent was 56 minutes. Over 50% of times was spent counseling and coordination of care regarding coping skills, medication and discharge planning.

## 2024-04-01 NOTE — PLAN OF CARE
Pt appeared to sleep for a total of 7 hours overnight. No PRN medications were administered, and no concerns were noted. 1:1 SIO (10ft) remains in place d/t ongoing pt report of command AH to harm others/active HI.     Problem: Sleep Disturbance  Goal: Adequate Sleep/Rest  Outcome: Progressing

## 2024-04-02 ENCOUNTER — TELEPHONE (OUTPATIENT)
Dept: BEHAVIORAL HEALTH | Facility: CLINIC | Age: 34
End: 2024-04-02

## 2024-04-02 LAB
GLUCOSE BLDC GLUCOMTR-MCNC: 108 MG/DL (ref 70–99)
GLUCOSE BLDC GLUCOMTR-MCNC: 135 MG/DL (ref 70–99)
GLUCOSE BLDC GLUCOMTR-MCNC: 99 MG/DL (ref 70–99)

## 2024-04-02 PROCEDURE — A9270 NON-COVERED ITEM OR SERVICE: HCPCS | Performed by: NURSE PRACTITIONER

## 2024-04-02 PROCEDURE — 250N000013 HC RX MED GY IP 250 OP 250 PS 637: Performed by: PSYCHIATRY & NEUROLOGY

## 2024-04-02 PROCEDURE — 93005 ELECTROCARDIOGRAM TRACING: CPT

## 2024-04-02 PROCEDURE — 99232 SBSQ HOSP IP/OBS MODERATE 35: CPT | Performed by: PSYCHIATRY & NEUROLOGY

## 2024-04-02 PROCEDURE — 250N000011 HC RX IP 250 OP 636: Performed by: PSYCHIATRY & NEUROLOGY

## 2024-04-02 PROCEDURE — 250N000013 HC RX MED GY IP 250 OP 250 PS 637: Performed by: STUDENT IN AN ORGANIZED HEALTH CARE EDUCATION/TRAINING PROGRAM

## 2024-04-02 PROCEDURE — 250N000013 HC RX MED GY IP 250 OP 250 PS 637: Performed by: NURSE PRACTITIONER

## 2024-04-02 PROCEDURE — 250N000013 HC RX MED GY IP 250 OP 250 PS 637

## 2024-04-02 PROCEDURE — G0177 OPPS/PHP; TRAIN & EDUC SERV: HCPCS

## 2024-04-02 PROCEDURE — 250N000013 HC RX MED GY IP 250 OP 250 PS 637: Performed by: EMERGENCY MEDICINE

## 2024-04-02 PROCEDURE — 124N000002 HC R&B MH UMMC

## 2024-04-02 RX ORDER — POLYETHYLENE GLYCOL 3350 17 G/17G
17 POWDER, FOR SOLUTION ORAL DAILY
Status: DISCONTINUED | OUTPATIENT
Start: 2024-04-02 | End: 2024-04-12 | Stop reason: HOSPADM

## 2024-04-02 RX ADMIN — ARIPIPRAZOLE 2 MG: 2 TABLET ORAL at 08:04

## 2024-04-02 RX ADMIN — PROPRANOLOL HYDROCHLORIDE 10 MG: 10 TABLET ORAL at 20:55

## 2024-04-02 RX ADMIN — NICOTINE POLACRILEX 4 MG: 4 GUM, CHEWING BUCCAL at 08:02

## 2024-04-02 RX ADMIN — QUETIAPINE FUMARATE 50 MG: 50 TABLET ORAL at 08:05

## 2024-04-02 RX ADMIN — QUETIAPINE FUMARATE 50 MG: 50 TABLET ORAL at 20:56

## 2024-04-02 RX ADMIN — NICOTINE POLACRILEX 4 MG: 4 GUM, CHEWING BUCCAL at 14:07

## 2024-04-02 RX ADMIN — NICOTINE POLACRILEX 4 MG: 4 GUM, CHEWING BUCCAL at 07:00

## 2024-04-02 RX ADMIN — EMPAGLIFLOZIN 10 MG: 10 TABLET, FILM COATED ORAL at 08:05

## 2024-04-02 RX ADMIN — NICOTINE POLACRILEX 4 MG: 4 GUM, CHEWING BUCCAL at 09:32

## 2024-04-02 RX ADMIN — PROPRANOLOL HYDROCHLORIDE 10 MG: 10 TABLET ORAL at 13:15

## 2024-04-02 RX ADMIN — LITHIUM CARBONATE 900 MG: 450 TABLET, EXTENDED RELEASE ORAL at 20:55

## 2024-04-02 RX ADMIN — DEXTROAMPHETAMINE SULFATE, DEXTROAMPHETAMINE SACCHARATE, AMPHETAMINE SULFATE AND AMPHETAMINE ASPARTATE 15 MG: 3.75; 3.75; 3.75; 3.75 CAPSULE, EXTENDED RELEASE ORAL at 08:06

## 2024-04-02 RX ADMIN — PROPRANOLOL HYDROCHLORIDE 10 MG: 10 TABLET ORAL at 08:05

## 2024-04-02 RX ADMIN — ZINC SULFATE 220 MG (50 MG) CAPSULE 220 MG: CAPSULE at 08:06

## 2024-04-02 RX ADMIN — SENNOSIDES AND DOCUSATE SODIUM 1 TABLET: 8.6; 5 TABLET ORAL at 10:03

## 2024-04-02 RX ADMIN — NICOTINE POLACRILEX 4 MG: 4 GUM, CHEWING BUCCAL at 20:55

## 2024-04-02 RX ADMIN — NICOTINE POLACRILEX 4 MG: 4 GUM, CHEWING BUCCAL at 05:58

## 2024-04-02 RX ADMIN — NICOTINE POLACRILEX 4 MG: 4 GUM, CHEWING BUCCAL at 15:55

## 2024-04-02 RX ADMIN — NICOTINE POLACRILEX 4 MG: 4 GUM, CHEWING BUCCAL at 12:43

## 2024-04-02 RX ADMIN — CLINDAMYCIN PHOSPHATE: 10 LOTION TOPICAL at 08:03

## 2024-04-02 RX ADMIN — NICOTINE POLACRILEX 4 MG: 4 GUM, CHEWING BUCCAL at 20:11

## 2024-04-02 RX ADMIN — Medication 500 MG: at 08:06

## 2024-04-02 RX ADMIN — QUETIAPINE FUMARATE 200 MG: 200 TABLET ORAL at 20:56

## 2024-04-02 RX ADMIN — ROSUVASTATIN 40 MG: 20 TABLET, FILM COATED ORAL at 08:05

## 2024-04-02 RX ADMIN — CLONAZEPAM 0.25 MG: 0.5 TABLET ORAL at 14:07

## 2024-04-02 RX ADMIN — NICOTINE 1 PATCH: 21 PATCH, EXTENDED RELEASE TRANSDERMAL at 08:04

## 2024-04-02 RX ADMIN — DOXYCYCLINE HYCLATE 100 MG: 100 CAPSULE ORAL at 20:56

## 2024-04-02 RX ADMIN — INSULIN GLARGINE 25 UNITS: 100 INJECTION, SOLUTION SUBCUTANEOUS at 08:02

## 2024-04-02 RX ADMIN — Medication 1 G: at 08:05

## 2024-04-02 RX ADMIN — AMLODIPINE BESYLATE 10 MG: 10 TABLET ORAL at 08:05

## 2024-04-02 RX ADMIN — TESTOSTERONE 50 MG OF TESTOSTERONE: 50 GEL TRANSDERMAL at 08:04

## 2024-04-02 RX ADMIN — ALUMINUM HYDROXIDE, MAGNESIUM HYDROXIDE, AND DIMETHICONE 30 ML: 200; 20; 200 SUSPENSION ORAL at 17:05

## 2024-04-02 RX ADMIN — CLINDAMYCIN PHOSPHATE: 10 LOTION TOPICAL at 20:57

## 2024-04-02 RX ADMIN — ONDANSETRON 4 MG: 4 TABLET, FILM COATED ORAL at 15:55

## 2024-04-02 RX ADMIN — DOXYCYCLINE HYCLATE 100 MG: 100 CAPSULE ORAL at 08:05

## 2024-04-02 RX ADMIN — DEUTETRABENAZINE 6 MG: 6 TABLET, COATED ORAL at 08:04

## 2024-04-02 RX ADMIN — POLYETHYLENE GLYCOL 3350 17 G: 17 POWDER, FOR SOLUTION ORAL at 18:05

## 2024-04-02 RX ADMIN — NICOTINE POLACRILEX 4 MG: 4 GUM, CHEWING BUCCAL at 18:05

## 2024-04-02 RX ADMIN — BENZOYL PEROXIDE: 50 LOTION TOPICAL at 08:03

## 2024-04-02 RX ADMIN — NICOTINE POLACRILEX 4 MG: 4 GUM, CHEWING BUCCAL at 10:56

## 2024-04-02 ASSESSMENT — ACTIVITIES OF DAILY LIVING (ADL)
ADLS_ACUITY_SCORE: 55
HYGIENE/GROOMING: INDEPENDENT
ADLS_ACUITY_SCORE: 55
DRESS: INDEPENDENT
ADLS_ACUITY_SCORE: 55
ORAL_HYGIENE: INDEPENDENT
ADLS_ACUITY_SCORE: 55
LAUNDRY: WITH SUPERVISION
ADLS_ACUITY_SCORE: 55
ADLS_ACUITY_SCORE: 55

## 2024-04-02 NOTE — PLAN OF CARE
"  Problem: Depressive Signs/Symptoms  Goal: Increased Participation and Engagement (Depressive Signs/Symptoms)  Outcome: Progressing         Pt has spent this shift attending scheduled programming, making phone calls and listening to headphones in the milieu. Pt reports goal today is to use less PRN medications and attend groups, has been successful thus far and reports feeling proud of himself for this. Pt reports improvement in anxiety which he feels is attributing to him having a better shift. Pt agreeable to accept PRN medications if he appears in distress and not to hesitate to take them when he he needs them. When pt discussed revocation of his commitment which upset him yesterday he states he \"has come to terms with it\" and is moving on and working towards goals and discharge. Pt was asked about SIB and states he is feeling safe from these thoughts today and his plan is to not have any new abrasions or scratches tomorrow. Noted to have two areas on left top of hand and wrist which he has scratched open superficially, applied gauze and paper tape due to adhesive allergy this shift. Pt continues to endorse SI but reports it is bothering him less today, endorses CAH telling him to harm others but has not made any attempts to touch or harm anyone this shift. Pt denies VH and has not been noted to be talking or laughing to self while in common areas. Pt affect appears slightly brighter and has been smiling more, eye contact has been improved this shift when interacting with writer. Pt frequently seeks out staff, has been utilizing pacing as a distraction with success this shift. Pt reports no BM for 2-3 days, received PRN senna with no result at this time. Pt continues on SIO for SI/HI as well as no roommate order due to thoughts of harming others. Pt denies pain or medication SE at this time but reports he talked to pharmacist about wanting more information about new medication started for TD (Austedo) as a side " effect is increased SI. Will continue to monitor and support plan of care.         1400-Pt requested PRN Klonopin as he reports increasing anxiety which was not relieved by scheduled propranolol.

## 2024-04-02 NOTE — PLAN OF CARE
BEH IP Unit Acuity Rating Score (UARS)  Patient is given one point for every criteria they meet.    CRITERIA SCORING   On a 72 hour hold, court hold, committed, stay of commitment, or revocation. 1/1    Patient LOS on BEH unit exceeds 20 days. 0/1  LOS: 6   Patient under guardianship, 55+, otherwise medically complex, or under age 11. 0/1   Suicide ideation without relief of precipitating factors. 0/1   Current plan for suicide. 0/1   Current plan for homicide. 1/1   Imminent risk or actual attempt to seriously harm another without relief of factors precipitating the attempt. 1/1   Severe dysfunction in daily living (ex: complete neglect for self care, extreme disruption in vegetative function, extreme deterioration in social interactions). 0/1   Recent (last 7 days) or current physical aggression in the ED or on unit. 0/1   Restraints or seclusion episode in past 72 hours. 0/1   Recent (last 7 days) or current verbal aggression, agitation, yelling, etc., while in the ED or unit. 0/1   Active psychosis. 1/1   Need for constant or near constant redirection (from leaving, from others, etc).  0/1   Intrusive or disruptive behaviors. 0/1   Patient requires 3 or more hours of individualized nursing care per 8-hour shift (i.e. for ADLs, meds, therapeutic interventions). 0/1   TOTAL 4

## 2024-04-02 NOTE — TELEPHONE ENCOUNTER
R:    [place] Order for Emergency Return to Facility received in [facility] on   04/02/2024 at 3:59:04 PM and 4:13:30 PM, placed in Folder labeled Court/Commitment/Ashley/Hold.

## 2024-04-02 NOTE — PLAN OF CARE
Night Nursing Note   4/1/24 - 4/2/24        Prakash was in bed at beginning of shift and appeared asleep.  Monitored on SIO @ 10 ft for assault and q15 mins for safety.    0600: Awake in lounge. Requested and given crystal light and nicorette  gum.  Offered no complaints. Has remain behaviorally controlled.  Appeared to sleep majority of night without difficulty.    Continue to monitor, offer support and reassurance per care plan.    Slept 6.25 hrs.

## 2024-04-02 NOTE — PLAN OF CARE
Occupational Therapy Group Note:     04/02/24 1619   Group Therapy Session   Group Attendance attended group session   Time Session Began 1400   Time Session Ended 1450   Total Time (minutes) 30   Total # Attendees 3   Group Type expressive therapy;task skill   Group Topic Covered cognitive activities;coping skills/lifestyle management;emotions/expression;leisure exploration/use of leisure time;relaxation techniques;structured socialization   Group Session Detail OT Clinic Group   Patient Response/Contribution cooperative with task   Patient Participation Detail Pt actively participated in occupational therapy clinic to facilitate coping skill exploration, creative expression within personally meaningful activities, and clinical observation of social, cognitive, and kinesthetic performance skills. Patient entered group late and was accompanied by SIO staff throughout full duration of time spent in group. Pt response: Leighton to initiate, gather materials, sequence, and adjust to workspace demands as needed. Demonstrated good focus, planning, and problem solving for selected journal decorating task. Patient appears motivated by hands-on creative activities/groups. Able to ask for assistance as needed. No social interaction with peers observed; intermittently social with staff members.

## 2024-04-02 NOTE — PROGRESS NOTES
"[facility], [place]   Psychiatric Progress Note        Interim History:     The patient's care was discussed with the treatment team during the daily team meeting and/or staff's chart notes were reviewed.  Staff report patient slept for 6.25 hours last night. Had an episode of Self injurious behavior (scratching self), requests for multiple prn meds. Continued on SIO and No roommate order. Continued to report Auditory hallucinations telling him to harm self or others, see RN's note below:    \"Pt has spent this shift attending scheduled programming, making phone calls and listening to headphones in the milieu. Pt reports goal today is to use less PRN medications and attend groups, has been successful thus far and reports feeling proud of himself for this. Pt reports improvement in anxiety which he feels is attributing to him having a better shift. Pt agreeable to accept PRN medications if he appears in distress and not to hesitate to take them when he he needs them. When pt discussed revocation of his commitment which upset him yesterday he states he \"has come to terms with it\" and is moving on and working towards goals and discharge. Pt was asked about SIB and states he is feeling safe from these thoughts today and his plan is to not have any new abrasions or scratches tomorrow. Noted to have two areas on left top of hand and wrist which he has scratched open superficially, applied gauze and paper tape due to adhesive allergy this shift. Pt continues to endorse SI but reports it is bothering him less today, endorses CAH telling him to harm others but has not made any attempts to touch or harm anyone this shift. Pt denies VH and has not been noted to be talking or laughing to self while in common areas. Pt affect appears slightly brighter and has been smiling more, eye contact has been improved this shift when interacting with writer. Pt frequently seeks out staff, has been utilizing " "pacing as a distraction with success this shift. Pt reports no BM for 2-3 days, received PRN senna with no result at this time. Pt continues on SIO for SI/HI as well as no roommate order due to thoughts of harming others. Pt denies pain or medication SE at this time but reports he talked to pharmacist about wanting more information about new medication started for TD (Austedo) as a side effect is increased SI. Will continue to monitor and support plan of care.\"    Met with patient: patient was seen in the conference room in presence of RN and SIO staff. Patient went in willingly and was cooperative during our visit, though, had limited eye contact. Speech was slow and monotonous, tended to answer in short fraises. When asked about Self injurious behavior episode, could not easily connect what caused it, but admitted that he was upset after meeting with CM and finding out that PD would be revoked and he was placed on a new 72 hour hold. Patient today appeared to be somewhat more open and told me that voices yesterday told him to grab my pen and stab me with it. I praised him for not doing that. We discussed criteria for discharge, plans, specifically, going to [facility]. Prakash said that he would go to IRTS, but would not like to leave his service dog. We discussed going to intensive course of DBT and Prakash was OK to enroll and attend. I voiced hope that an additional dose of Seroquel during the day would help him to deal with Auditory hallucinations. Suggested to do EKG as he in the past complained of palpitations with Seroquel. Prakash agreed, asked for Miralax sched dose. He denied presence of active Suicidal ideation today and had no more questions or concerns.           Medications:     Current Facility-Administered Medications   Medication Dose Route Frequency Provider Last Rate Last Admin    amLODIPine (NORVASC) tablet 10 mg  10 mg Oral Daily Marco Dean MD   10 mg at 04/02/24 0805    " amphetamine-dextroamphetamine (ADDERALL XR) ER cap 15 mg  15 mg Oral Daily Cortez Kruger MD   15 mg at 04/02/24 0806    ARIPiprazole (ABILIFY) tablet 2 mg  2 mg Oral QAM Cortez Kruger MD   2 mg at 04/02/24 0804    benzoyl peroxide 5 % lotion   Topical Daily Cortez Kruger MD   Given at 04/02/24 0803    clindamycin (CLEOCIN T) 1 % lotion   Topical BID Cortez Kruger MD   Given at 04/02/24 0803    deutetrabenazine (AUSTEDO) tablet 6 mg  6 mg Oral Daily Helena Escobar APRN CNP   6 mg at 04/02/24 0804    doxycycline hyclate (VIBRAMYCIN) capsule 100 mg  100 mg Oral BID Marco Dean MD   100 mg at 04/02/24 0805    empagliflozin (JARDIANCE) tablet 10 mg  10 mg Oral Daily Marco Dean MD   10 mg at 04/02/24 0805    fish oil-omega-3 fatty acids capsule 1 g  1 g Oral Daily Cortez Kruger MD   1 g at 04/02/24 0805    fluPHENAZine decanoate (PROLIXIN) injection 25 mg  25 mg Intramuscular Q14 Days Helena Escobar APRN CNP   25 mg at 03/27/24 1223    insulin glargine (LANTUS PEN) injection 25 Units  25 Units Subcutaneous QAM AC Marco Dean MD   25 Units at 04/02/24 0802    lithium (ESKALITH CR/LITHOBID) CR tablet 900 mg  900 mg Oral At Bedtime Marco Dean MD   900 mg at 04/01/24 2024    magnesium gluconate (MAGONATE) tablet 500 mg  500 mg Oral Daily Cortez Kruger MD   500 mg at 04/02/24 0806    nicotine (NICODERM CQ) 21 MG/24HR 24 hr patch 1 patch  1 patch Transdermal Q24H Cortez Kruger MD   1 patch at 04/02/24 0804    polyethylene glycol (MIRALAX) Packet 17 g  17 g Oral Daily Cortez Kruger MD        propranolol (INDERAL) tablet 10 mg  10 mg Oral TID Marco Dean MD   10 mg at 04/02/24 1315    QUEtiapine (SEROquel) tablet 200 mg  200 mg Oral At Bedtime Espinoza Rodriguez DO   200 mg at 04/01/24 2030    QUEtiapine (SEROquel) tablet 50 mg  50 mg Oral BID Cortez Kruger MD   50 mg at 04/02/24 0805     "rosuvastatin (CRESTOR) tablet 40 mg  40 mg Oral Daily Marco Dean MD   40 mg at 04/02/24 0805    Semaglutide (RYBELSUS) tablet 7 mg  7 mg Oral Daily Valentino Sarah MD   7 mg at 04/02/24 0803    testosterone (ANDROGEL/TESTIM) 50 MG/5GM (1%) topical gel 50 mg of testosterone  50 mg of testosterone Transdermal Daily Brooklyn Negro APRN CNP   50 mg of testosterone at 04/02/24 0804    zinc sulfate (ZINCATE) capsule 220 mg  220 mg Oral Daily Cortez Kruger MD   220 mg at 04/02/24 0806          Allergies:     Allergies   Allergen Reactions    Haldol [Haloperidol] Other (See Comments)     Makes patient very angry and anxious    Adhesive Tape Hives    Percocet [Oxycodone-Acetaminophen] Nausea and Vomiting    Prednisone Other (See Comments) and Hives     Suicidal ideation    Risperidone Other (See Comments)    Tramadol Hcl Nausea and Vomiting    Droperidol Anxiety    Seroquel [Quetiapine] Palpitations     Spent 2 weeks in the hospital due to having seroquel, caused palpitations and QT prolongation          Labs:     Recent Results (from the past 24 hour(s))   Glucose by meter    Collection Time: 04/01/24  8:45 PM   Result Value Ref Range    GLUCOSE BY METER POCT 83 70 - 99 mg/dL   Glucose by meter    Collection Time: 04/01/24  9:23 PM   Result Value Ref Range    GLUCOSE BY METER POCT 91 70 - 99 mg/dL   Glucose by meter    Collection Time: 04/02/24  8:10 AM   Result Value Ref Range    GLUCOSE BY METER POCT 108 (H) 70 - 99 mg/dL          Psychiatric Examination:     /78   Pulse 62   Temp 98.5  F (36.9  C) (Oral)   Resp 18   Ht 1.676 m (5' 6\")   Wt 99.8 kg (220 lb)   SpO2 96%   BMI 35.51 kg/m    Weight is 220 lbs 0 oz  Body mass index is 35.51 kg/m .    Orthostatic Vitals         Most Recent      Sitting Orthostatic /80 04/02 0915    Sitting Orthostatic Pulse (bpm) 75 04/02 0915    Standing Orthostatic /83 04/02 0915    Standing Orthostatic Pulse (bpm) 82 04/02 0915 "           Appearance: dressed in hospital scrubs and moderately obese  Attitude:  somewhat cooperative  Eye Contact:  poor   Mood:  still anxious, sad, less irritable  Affect:  constricted mobility  Speech:  decreased prosody and paucity of speech  Psychomotor Behavior:  no evidence of tardive dyskinesia, dystonia, or tics  Throught Process:  linear and goal oriented  Associations:  no loose associations  Thought Content:  passive suicidal ideation present, auditory hallucinations present, and reports thoughts of harming self and others  Insight:  limited  Judgement:  poor  Oriented to:  time, person, and place  Attention Span and Concentration:  limited  Recent and Remote Memory:  limited    Clinical Global Impressions  First: 7/4 4/1/2024      Most recent:            Precautions:     Behavioral Orders   Procedures    Assault precautions    Code 1 - Restrict to Unit    Routine Programming     As clinically indicated    Status 15     Every 15 minutes.    Status Individual Observation     Patient SIO status reviewed with team/RN.  Please also refer to RN/team documentation for add'l detail.  SIO is for safety only, staff are not available for socialization and playing games or entertaining pt  -SIO staff to monitor following which have contributed to patient being on SIO:  Homicidal ideation  -Possible interventions SIO staff could use to support patient's treatment progress:  Monitor patient, medication administration  -When following observed, team will review discontinuation of SIO:  When HI resolves     Order Specific Question:   CONTINUOUS 24 hours / day     Answer:   Other     Order Specific Question:   Specify distance     Answer:   10 ft     Order Specific Question:   Indications for SIO     Answer:   Assault risk    Suicide precautions: Suicide Risk: MODERATE     Patients on Suicide Precautions should have a Combination Diet ordered that includes a Diet selection(s) AND a Behavioral Tray selection for Safe  Tray - with utensils, or Safe Tray - NO utensils       Order Specific Question:   Suicide Risk     Answer:   MODERATE          DIagnoses:     Schizoaffective disorder bipolar type, current episode depressed, severe, with psychosis  Homicidal ideation  Borderline personality disorder  PTSD  ADHD, per history  Gender Dysphoria   Nicotine use disorder, severe, dependence  Cannabis use disorder, moderate, dependence  Opioid use disorder, moderate in early remission  Amphetamine use disorder, moderate   Ecstacy use disorder, 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 Some of it appears to be attention seeking, not of a genuine psychotic process. Day time Seroquel was added on 4/1. Will add Miralax and order EKG on 4/2/2024. Will continue to provide support and structure and continue on SIO and no roommate order.      Total time spent was 36 minutes. Over 50% of times was spent counseling and coordination of care regarding coping skills, medication and discharge planning.

## 2024-04-02 NOTE — PLAN OF CARE
04/02/24 1256   Group Therapy Session   Group Attendance attended group session   Time Session Began 1015   Time Session Ended 1100   Total Time (minutes) 45   Total # Attendees 4   Group Type life skill;psychoeducation   Group Topic Covered balanced lifestyle;emotions/expression;self-care activities;relaxation techniques;leisure exploration/use of leisure time;cognitive activities;coping skills/lifestyle management   Group Session Detail General Health and Coping Skills: Journaling - group activity to educate and promote the therapeutic benefits of processing and self-reflection using journaling. Patients construct booklets with crafting material to use for the journaling activity.   Patient Participation Detail Pt participated in a group activity exploring the therapeutic benefits of journaling. Pt appeared comfortable and friendly while participating during group. Work was neat and thoughtfully completed. Pt was able to complete their booklet and reported that they had their own journal to use; declined to journal during the group activity.

## 2024-04-02 NOTE — PLAN OF CARE
Team Note Due:  Friday    Assessment/Intervention/Current Symtoms and Care Coordination:  Chart review, discussed pt progress, symptomology, and response to treatment.  Discussed the discharge plan and any potential impediments to discharge.    Tasks Completed:  - Received revocation paperwork. Provided copy to pt, placed copy in chart.    Discharge Plan or Goal:  Return to group and resume w/ outpatient providers.      Barriers to Discharge:  HI threats     Referral Status:  None     Legal Status:  72HH (hold placed 4/1 at 1448 ). Waiting on revoked PD from CM.  County: [place]  File Number: [id number]    Contacts:  Psychiatrist/Medication Management:  Name/Organization: Regine Last, CNP, APRN [facility]  Phone: [phone]  Fax: [phone]    Therapist:  Name/Organization: Joshua  [facility] - VAL signed  Phone: [phone]     :  Name/Organization: Cristy Ji  [facility] - VAL signed  Phone: [phone]    Group Home:  Name/Organization: [facility] Director  [facility] - VAL signed  Phone: [phone]    [facility]:  Name/Organization: Deena Ibarra - VAL signed  Phone: [phone]     CADI Worker:  Name/Organization: Pretty Guzman  [facility] - VAL signed  Phone: [phone]     Upcoming Meetings and Dates/Important Information and next steps:  None      Pt is a restricted recipient:  Per chart: Insurance Type: Medicaid  Contracted Service Provider: [facility] Provider ID: [id number]     Pt is restricted Provider number [id number], [employer] is a provider for a Restricted Recipient for: Pharmacy    Provider number [id number], JENNIFER LEY is a provider for a Restricted Recipient for: Physician Services ,  Nurse Practitioner Services    Provider number [id number], [facility] is a provider for a Restricted Recipient for: Physician Services ,  Inpatient Hospital ,  Diagnostic  Lab ,  Outpatient Hospital    Provider number [id number], [facility] [employer] is a provider for a Restricted   RRP Referral Needed within 90 days of Service

## 2024-04-02 NOTE — PLAN OF CARE
"Problem: Anxiety Signs/Symptoms  Goal: Improved Mood Symptoms (Anxiety Signs/Symptoms)  Outcome: Not Progressing   Goal Outcome Evaluation:     Plan of Care Reviewed With: patient     Patient observed out in the milieu, watching television or listening to music through headphones. At about 1625H, pt approached writer and asked for a band aid. He had a superficial abrasion on left outer wrist. He stated \"I picked on it coz I was anxious\". Rated anxiety 8/10. He also claimed having depression rated, 4/10. Applied Bacitracin over site and covered with band-aid. Patient also claimed experiencing command hallucinations. Described it as \"voices telling me to hurt others\". PRN Clonazepam also given per request. Pt claimed medication was effective after reassessment.     Patient presents with flat, anxious, tense, labile affect. Mood was anxious and depressed. Pt continues to endorse SI and HI due to command hallucinations telling him to hurt people and himself. He has been med compliant and requesting for PRN medications. Therapeutic modalities such as music, television for distraction provided. Pt was accepting. He also claimed he likes journaling. Patient claimed his main goal is to \"do better, be better and to discharge\". Encouraged patient on his goal and also strengthen his coping skills. Pt nodded and verbalized understanding.     Later on the shift, pt denies having suicidal thoughts but still endorsed command hallucinations and having anxiety. Offered PRN medications. Patient declined stating \"it's not as bad as earlier. I'm better\". Pt was observed writing in his journal and listening to music.     Due medications given. Denies experiencing side effects.    Medical Concerns: superficial abrasion on left outer wrist. Pt denies pain. Dressing dry and intact.   B mg/dl  Bedtime B mg/dl. Rechecked 91 mg/dl.   Appetite: Consumed 50% burger, 50% of rice and took approximately 500 ml of fluids. Pt claimed " "having \"low\" appetite today. Encouraged to take snacks, pt initially refused then later on accepted bedtime snacks. He ate 4 slices of bread, 2 orange juice and 1 cup of milk.   Sleep: \"it's been good\"  LBM: \"yesterday\" Encouraged patient to increase fluid intake. He verbalized understanding.   ADLs: Independent.   PRNs given: PRN Clonazepam for anxiety given per request. Nicotine gum x 4.     Pt continues to be on SIO 10 feet for assault risk. No roommate in place for active homicidal thoughts. Needs attended to. No further concerns noted as of this time.                     "

## 2024-04-03 LAB
ATRIAL RATE - MUSE: 62 BPM
DIASTOLIC BLOOD PRESSURE - MUSE: NORMAL MMHG
GLUCOSE BLDC GLUCOMTR-MCNC: 106 MG/DL (ref 70–99)
GLUCOSE BLDC GLUCOMTR-MCNC: 161 MG/DL (ref 70–99)
GLUCOSE BLDC GLUCOMTR-MCNC: 83 MG/DL (ref 70–99)
INTERPRETATION ECG - MUSE: NORMAL
P AXIS - MUSE: 20 DEGREES
PR INTERVAL - MUSE: 198 MS
QRS DURATION - MUSE: 102 MS
QT - MUSE: 440 MS
QTC - MUSE: 446 MS
R AXIS - MUSE: 26 DEGREES
SYSTOLIC BLOOD PRESSURE - MUSE: NORMAL MMHG
T AXIS - MUSE: 50 DEGREES
VENTRICULAR RATE- MUSE: 62 BPM

## 2024-04-03 PROCEDURE — 250N000013 HC RX MED GY IP 250 OP 250 PS 637: Performed by: NURSE PRACTITIONER

## 2024-04-03 PROCEDURE — A9270 NON-COVERED ITEM OR SERVICE: HCPCS | Performed by: NURSE PRACTITIONER

## 2024-04-03 PROCEDURE — 99232 SBSQ HOSP IP/OBS MODERATE 35: CPT | Performed by: PSYCHIATRY & NEUROLOGY

## 2024-04-03 PROCEDURE — 250N000013 HC RX MED GY IP 250 OP 250 PS 637: Performed by: STUDENT IN AN ORGANIZED HEALTH CARE EDUCATION/TRAINING PROGRAM

## 2024-04-03 PROCEDURE — 90853 GROUP PSYCHOTHERAPY: CPT

## 2024-04-03 PROCEDURE — 250N000013 HC RX MED GY IP 250 OP 250 PS 637: Performed by: PSYCHIATRY & NEUROLOGY

## 2024-04-03 PROCEDURE — 250N000009 HC RX 250

## 2024-04-03 PROCEDURE — 250N000013 HC RX MED GY IP 250 OP 250 PS 637

## 2024-04-03 PROCEDURE — G0177 OPPS/PHP; TRAIN & EDUC SERV: HCPCS

## 2024-04-03 PROCEDURE — 99231 SBSQ HOSP IP/OBS SF/LOW 25: CPT

## 2024-04-03 PROCEDURE — 124N000002 HC R&B MH UMMC

## 2024-04-03 PROCEDURE — 250N000013 HC RX MED GY IP 250 OP 250 PS 637: Performed by: EMERGENCY MEDICINE

## 2024-04-03 PROCEDURE — 250N000011 HC RX IP 250 OP 636: Performed by: PSYCHIATRY & NEUROLOGY

## 2024-04-03 RX ORDER — PAROXETINE 10 MG/1
10 TABLET, FILM COATED ORAL DAILY
Status: DISCONTINUED | OUTPATIENT
Start: 2024-04-03 | End: 2024-04-12 | Stop reason: HOSPADM

## 2024-04-03 RX ORDER — ACETAMINOPHEN 325 MG/1
650 TABLET ORAL EVERY 6 HOURS PRN
Status: DISCONTINUED | OUTPATIENT
Start: 2024-04-03 | End: 2024-04-12 | Stop reason: HOSPADM

## 2024-04-03 RX ORDER — ARIPIPRAZOLE 5 MG/1
5 TABLET ORAL EVERY MORNING
Status: DISCONTINUED | OUTPATIENT
Start: 2024-04-04 | End: 2024-04-12 | Stop reason: HOSPADM

## 2024-04-03 RX ORDER — GINSENG 100 MG
CAPSULE ORAL 2 TIMES DAILY
Status: DISCONTINUED | OUTPATIENT
Start: 2024-04-03 | End: 2024-04-12 | Stop reason: HOSPADM

## 2024-04-03 RX ADMIN — FLUPHENAZINE HYDROCHLORIDE 5 MG: 5 TABLET, FILM COATED ORAL at 12:56

## 2024-04-03 RX ADMIN — ONDANSETRON 4 MG: 4 TABLET, FILM COATED ORAL at 13:36

## 2024-04-03 RX ADMIN — NICOTINE POLACRILEX 4 MG: 4 GUM, CHEWING BUCCAL at 14:44

## 2024-04-03 RX ADMIN — CLINDAMYCIN PHOSPHATE: 10 LOTION TOPICAL at 19:22

## 2024-04-03 RX ADMIN — NICOTINE POLACRILEX 4 MG: 4 GUM, CHEWING BUCCAL at 06:49

## 2024-04-03 RX ADMIN — NICOTINE 1 PATCH: 21 PATCH, EXTENDED RELEASE TRANSDERMAL at 08:21

## 2024-04-03 RX ADMIN — NICOTINE POLACRILEX 4 MG: 4 GUM, CHEWING BUCCAL at 17:19

## 2024-04-03 RX ADMIN — BACITRACIN: 500 OINTMENT TOPICAL at 19:28

## 2024-04-03 RX ADMIN — NICOTINE POLACRILEX 4 MG: 4 GUM, CHEWING BUCCAL at 07:58

## 2024-04-03 RX ADMIN — PROPRANOLOL HYDROCHLORIDE 10 MG: 10 TABLET ORAL at 14:35

## 2024-04-03 RX ADMIN — PROPRANOLOL HYDROCHLORIDE 10 MG: 10 TABLET ORAL at 08:19

## 2024-04-03 RX ADMIN — DOXYCYCLINE HYCLATE 100 MG: 100 CAPSULE ORAL at 19:21

## 2024-04-03 RX ADMIN — EMPAGLIFLOZIN 10 MG: 10 TABLET, FILM COATED ORAL at 08:19

## 2024-04-03 RX ADMIN — ZINC SULFATE 220 MG (50 MG) CAPSULE 220 MG: CAPSULE at 08:19

## 2024-04-03 RX ADMIN — SENNOSIDES AND DOCUSATE SODIUM 1 TABLET: 8.6; 5 TABLET ORAL at 17:19

## 2024-04-03 RX ADMIN — AMLODIPINE BESYLATE 10 MG: 10 TABLET ORAL at 08:19

## 2024-04-03 RX ADMIN — NICOTINE POLACRILEX 4 MG: 4 GUM, CHEWING BUCCAL at 18:51

## 2024-04-03 RX ADMIN — HYDROXYZINE HYDROCHLORIDE 25 MG: 25 TABLET, FILM COATED ORAL at 20:20

## 2024-04-03 RX ADMIN — Medication 500 MG: at 08:19

## 2024-04-03 RX ADMIN — BENZOYL PEROXIDE: 50 LOTION TOPICAL at 08:20

## 2024-04-03 RX ADMIN — NICOTINE POLACRILEX 4 MG: 4 GUM, CHEWING BUCCAL at 06:47

## 2024-04-03 RX ADMIN — NICOTINE POLACRILEX 4 MG: 4 GUM, CHEWING BUCCAL at 09:57

## 2024-04-03 RX ADMIN — DEXTROAMPHETAMINE SULFATE, DEXTROAMPHETAMINE SACCHARATE, AMPHETAMINE SULFATE AND AMPHETAMINE ASPARTATE 15 MG: 3.75; 3.75; 3.75; 3.75 CAPSULE, EXTENDED RELEASE ORAL at 08:18

## 2024-04-03 RX ADMIN — LITHIUM CARBONATE 900 MG: 450 TABLET, EXTENDED RELEASE ORAL at 19:21

## 2024-04-03 RX ADMIN — CLONAZEPAM 0.25 MG: 0.5 TABLET ORAL at 12:13

## 2024-04-03 RX ADMIN — NICOTINE POLACRILEX 4 MG: 4 GUM, CHEWING BUCCAL at 20:20

## 2024-04-03 RX ADMIN — DOXYCYCLINE HYCLATE 100 MG: 100 CAPSULE ORAL at 08:19

## 2024-04-03 RX ADMIN — NICOTINE POLACRILEX 4 MG: 4 GUM, CHEWING BUCCAL at 12:57

## 2024-04-03 RX ADMIN — INSULIN GLARGINE 25 UNITS: 100 INJECTION, SOLUTION SUBCUTANEOUS at 08:20

## 2024-04-03 RX ADMIN — DEUTETRABENAZINE 6 MG: 6 TABLET, COATED ORAL at 08:18

## 2024-04-03 RX ADMIN — NICOTINE POLACRILEX 4 MG: 4 GUM, CHEWING BUCCAL at 16:03

## 2024-04-03 RX ADMIN — POLYETHYLENE GLYCOL 3350 17 G: 17 POWDER, FOR SOLUTION ORAL at 08:18

## 2024-04-03 RX ADMIN — TESTOSTERONE 50 MG OF TESTOSTERONE: 50 GEL TRANSDERMAL at 08:19

## 2024-04-03 RX ADMIN — ARIPIPRAZOLE 2 MG: 2 TABLET ORAL at 08:19

## 2024-04-03 RX ADMIN — ROSUVASTATIN 40 MG: 20 TABLET, FILM COATED ORAL at 08:19

## 2024-04-03 RX ADMIN — OLANZAPINE 10 MG: 10 TABLET, FILM COATED ORAL at 20:20

## 2024-04-03 RX ADMIN — QUETIAPINE FUMARATE 50 MG: 50 TABLET ORAL at 19:21

## 2024-04-03 RX ADMIN — QUETIAPINE FUMARATE 200 MG: 200 TABLET ORAL at 19:21

## 2024-04-03 RX ADMIN — ACETAMINOPHEN 650 MG: 325 TABLET, FILM COATED ORAL at 13:39

## 2024-04-03 RX ADMIN — CLINDAMYCIN PHOSPHATE: 10 LOTION TOPICAL at 08:20

## 2024-04-03 RX ADMIN — QUETIAPINE FUMARATE 50 MG: 50 TABLET ORAL at 08:19

## 2024-04-03 RX ADMIN — PROPRANOLOL HYDROCHLORIDE 10 MG: 10 TABLET ORAL at 19:21

## 2024-04-03 RX ADMIN — NICOTINE POLACRILEX 4 MG: 4 GUM, CHEWING BUCCAL at 11:08

## 2024-04-03 RX ADMIN — PAROXETINE HYDROCHLORIDE 10 MG: 10 TABLET, FILM COATED ORAL at 11:08

## 2024-04-03 RX ADMIN — FLUPHENAZINE HYDROCHLORIDE 5 MG: 5 TABLET, FILM COATED ORAL at 17:19

## 2024-04-03 RX ADMIN — DOCUSATE SODIUM 100 MG: 50 CAPSULE, LIQUID FILLED ORAL at 17:46

## 2024-04-03 RX ADMIN — Medication 5 MG: at 19:21

## 2024-04-03 RX ADMIN — Medication 1 G: at 08:19

## 2024-04-03 ASSESSMENT — ACTIVITIES OF DAILY LIVING (ADL)
ADLS_ACUITY_SCORE: 55
LAUNDRY: WITH SUPERVISION
ADLS_ACUITY_SCORE: 55
DRESS: INDEPENDENT
ADLS_ACUITY_SCORE: 55
HYGIENE/GROOMING: INDEPENDENT
ADLS_ACUITY_SCORE: 55
ORAL_HYGIENE: INDEPENDENT

## 2024-04-03 NOTE — CONSULTS
"Brief Medicine Note    Contacted by nursing regarding wounds on left hand and wrist.     Today's vital signs, medications, and nursing notes were reviewed.    Laboratory and Imaging studies reviewed in the results review section of Epic. Pertinent studies are as below:         /71 (BP Location: Left arm)   Pulse 70   Temp 97.7  F (36.5  C) (Temporal)   Resp 16   Ht 1.676 m (5' 6\")   Wt 99.8 kg (220 lb)   SpO2 97%   BMI 35.51 kg/m    General: A&O. NAD.       A/P:    # Superficial wounds 2/2 self harm  Pt states around 3/31-4/1, he intentionally scraped and scratched skin off of his hand and wrist with his fingernails. Wound on wrist was noted to be swollen and hard with purulent drainage and blisters. On exam today, wound bed is pink with clear drainage. Soft tissue on wrist notably swollen and painful with movement. Denies blunt trauma to wrist. Wound on hand with serous drainage and red wound bed. No surrounding erythema or streaking noted.   -Cleanse wounds with saline and pat dry  -Bacitracin to wounds BID  -Mepilex dressing x 2, 1 on each wound. Change every 3 days or sooner with copious drainage  -Ice and elevation of L wrist several times a day  -APAP 650 mg every 6 hours PRN pain (dosed for mild elevation in LFTs)  -NSAIDs contraindicated with lithium use        BROOKLYN Bailey CNP  Internal Medicine [facility] Hospitalist  Securely message with the Vocera Web Console (learn more here)  Text paging via PowerReviews is appreciated  April 3, 2024    "

## 2024-04-03 NOTE — PLAN OF CARE
"Team Note Due:  Friday    Assessment/Intervention/Current Symtoms and Care Coordination:  Chart review, discussed pt progress, symptomology, and response to treatment.  Discussed the discharge plan and any potential impediments to discharge.    Tasks Completed:  - [facility] received email from pt's , Cristy, to schedule intake. [facility] followed up via email for more details on what the intake is for as there were no details provided in the email.   - [facility] met with pt to discuss discharge options. CTC discussed IRTS placement and DBT programs and provided more information on both. Pt reported that \"both are on the table\". Pt expressed hesitance regarding IRTS because of his service dog not being able to join him and because when he has attended IRTS programs in the past, other clients there had brought drugs in. Pt reports that he is actively trying to remain sober and is not interested in being around this. [facility] validated pt and shared that this is unfortunately out of our control with IRTS program placements. Pt reported understanding. Pt shared that he is interested in IRTS because he acknowledges difficulty with remaining compliant with outpatient recommendations once he leaves the hospital. Pt believes going to a step down program would help him follow-through more effectively. Pt shared that the availability to talk to a counselor when he is struggling in the community with DBT-adherent programs \"is appealing\" as well. [facility] assessed pt's preferences for location and pt reports he would like to go somewhere that allows smoking and in the [place] of the [facility] ([place], [place], [facility]). Pt is not interested in [facility]. [facility] went over VAL with pt for IRTS referrals and will submit referrals for placement today.   - Submitted IRTS referrals - see below.    Discharge Plan or Goal:  Current plan is to stabilize symptoms and develop safe disposition plan. Following hospitalization, " plan is for IRTS or group home with outpatient support.     Barriers to Discharge:  Pt continues to endorse command hallucinations to harm others. Pt reports he will not act on these. Pt remains on 1:1 SIO due to HI. Further stabilization needed.     Referral Status:    IRTS Referrals:  [facility] - Submitted 4/3/2024  Contact: Kait Noonan,   Direct dial: [phone]/fax [phone]  Direct email: [email or web address]  General Email: [email or web address]    [facility]  [place]: [phone]  Fax: [phone]   [facility]: [phone]  Fax: [phone]     Legal Status:  Revoked PD   County: [place]  File Number: [id number]    Contacts:  Psychiatrist/Medication Management:  Name/Organization: Regine Last, CNP, APRN [facility]  Phone: [phone]  Fax: [phone]    Therapist:  Name/Organization: Joshua  [facility] - VAL signed  Phone: [phone]     :  Name/Organization: Cristy Ji  [facility] - VAL signed  Phone: [phone]    Group Home:  Name/Organization: [facility]  [facility] - VAL signed  Phone: [phone]    [facility]:  Name/Organization: Deena Ibarra - VAL signed  Phone: [phone]     CADI Worker:  Name/Organization: Pretty Guzman  [facility] - VAL signed  Phone: [phone]     Upcoming Meetings and Dates/Important Information and next steps:  None      Pt is a restricted recipient:  Per chart: Insurance Type: Medicaid  [facility]: [id number]     Pt is restricted Provider number [id number], [employer] is a provider for a Restricted Recipient for: Pharmacy    Provider number [id number], JENNIFER LYE is a provider for a Restricted Recipient for: Physician Services ,  Nurse Practitioner Services    Provider  number [id number], [facility] is a provider for a Restricted Recipient for: Physician Services ,  Inpatient Hospital ,  Diagnostic Lab ,  Outpatient Hospital    Provider number [id number], [facility] is a provider for a Restricted   RRP Referral Needed within 90 days of Service

## 2024-04-03 NOTE — PLAN OF CARE
BEH IP Unit Acuity Rating Score (UARS)  Patient is given one point for every criteria they meet.    CRITERIA SCORING   On a 72 hour hold, court hold, committed, stay of commitment, or revocation. 1/1    Patient LOS on BEH unit exceeds 20 days. 0/1  LOS: 7   Patient under guardianship, 55+, otherwise medically complex, or under age 11. 0/1   Suicide ideation without relief of precipitating factors. 0/1   Current plan for suicide. 0/1   Current plan for homicide. 0/1   Imminent risk or actual attempt to seriously harm another without relief of factors precipitating the attempt. 0/1   Severe dysfunction in daily living (ex: complete neglect for self care, extreme disruption in vegetative function, extreme deterioration in social interactions). 0/1   Recent (last 7 days) or current physical aggression in the ED or on unit. 0/1   Restraints or seclusion episode in past 72 hours. 0/1   Recent (last 7 days) or current verbal aggression, agitation, yelling, etc., while in the ED or unit. 0/1   Active psychosis. 1/1   Need for constant or near constant redirection (from leaving, from others, etc).  0/1   Intrusive or disruptive behaviors. 0/1   Patient requires 3 or more hours of individualized nursing care per 8-hour shift (i.e. for ADLs, meds, therapeutic interventions). 1/1   TOTAL 3

## 2024-04-03 NOTE — PLAN OF CARE
Pt appeared to sleep for a total of 6.75 hours overnight. No PRN medications were administered, and no concerns were noted.     Problem: Sleep Disturbance  Goal: Adequate Sleep/Rest  Outcome: Progressing

## 2024-04-03 NOTE — PLAN OF CARE
04/03/24 1322   Group Therapy Session   Group Attendance attended group session   Time Session Began 1115   Time Session Ended 1200   Total Time (minutes) 25   Total # Attendees 3   Group Type life skill;other (see comments)  (OT)   Group Topic Covered balanced lifestyle;coping skills/lifestyle management;relaxation techniques   Group Session Detail OT - Coping: Focus of group was on the practice of a variety of structured relaxation techniques. Pt were to identify those they have tried to practice and ways to improve and/or try new techniques based on their own situations and symptoms.    Patient Response/Contribution able to recall/repeat info presented;discussed personal experience with topic;expressed understanding of topic   Patient Participation Detail Pt was late to group. He easily engaged in the discussion and noted several specific techniques, noting they were grounding techniques and DBT skills. Was open to listen to new techniques and how to potentially add them to practice.

## 2024-04-03 NOTE — PLAN OF CARE
Patient requested to have bandages changed on his left hand due to them being getting wet. After removing the bandages patient was instructed to wash his hands with soap and water. Dry gauze replaced. Patient has 2 spots where he stated he scratched himself, once on [place] 3/31/24 and Monday 4/1/24. The wound closer to his wrist is swollen and hard. The lower wound closer to his fingers appears to have white drainage. Patient states that the swelling was not present earlier and says it hurts on touch. On call provider paged.

## 2024-04-03 NOTE — PLAN OF CARE
"Patient has spent majority of the shift in the milieu. Continues to be on a 24 hour SIO. Denies suicidal ideation and self injurious thoughts. Reports having anxiety and depression. Reports having voices that are telling him to hurt others. Affect is flat and keeps mostly to himself. Had a visit which went well. Nintendo switch was given to patients visitor at his request. Completed his EKG. Ate dinner and snack. Blood sugars were 92 and 135. Med compliant. Cooperative on the unit.     Patient evaluation continues. Assessed mood,anxiety,thoughts and behavior.     Patient gradually progressing towards goals.    Patient is encouraged to participate in groups and assisted to develop healthy coping skills.     VS reviewed: /87   Pulse 82   Temp 98.1  F (36.7  C) (Oral)   Resp 16   Ht 1.676 m (5' 6\")   Wt 99.8 kg (220 lb)   SpO2 96%   BMI 35.51 kg/m      Length of stay: 6    Refer to daily team meeting notes for individualized plan of care. Nursing will continue to assess.    "

## 2024-04-03 NOTE — PLAN OF CARE
04/03/24 1315   Group Therapy Session   Group Attendance attended group session   Time Session Began 1015   Time Session Ended 1100   Total Time (minutes) 45   Total # Attendees 4   Group Type life skill;task skill;other (see comments)  (OT)   Group Topic Covered balanced lifestyle;coping skills/lifestyle management;leisure exploration/use of leisure time;structured socialization   Group Session Detail OT Clinic: Activity group for creative expression, promoting autonomy, building self worth, coping with stress and symptoms, and an opportunity to exercise cognitive skills (I.e. initiation, planning, organizing, sequencing, sustained attention, follow through, and overall self awareness).   Patient Response/Contribution able to recall/repeat info presented;cooperative with task;discussed personal experience with topic;expressed understanding of topic   Patient Participation Detail Pt initiated coming to group and brought all of his own supplies into group. Staff inquired re: choice of activity for session and he noted uncertainty at the moment. Staff did note the materials he had were for on the unit, so if he wanted to choose something that he could not do on the unit for increased variety of things for management, he could do so. Noted he would think about it and did so for several minutes. Staff offered unfamiliar creative tools to try and he was interested. He followed directions well, affect was bright during session and he accepted positive feedback re: his results. Noted that this was an activity he would like to add to his repertoire at home.

## 2024-04-03 NOTE — PLAN OF CARE
"  Problem: Suicide Risk  Goal: Absence of Self-Harm  Outcome: Progressing         Pt has not had any behavioral outbursts this shift, attended some scheduled programming which was a helpful distraction for him. Pt continues to reports CAH telling him to harm others but has not engaged in any aggressive behavior nor has he been observed talking or laughing to self or otherwise opening RIS. Pt denied SI/SIB earlier in the morning but reports increased anxiety after a peer had a behavioral escalation which increased pt anxiety and led to thoughts of SI/SIB, has not engaged in any SI/SIB behavior but reports he is struggling. Pt has received PRN Klonopin and Prolixin for anxiety and AH. Pt ate breakfast this morning but did not eat lunch, reports nausea at lunch and received PRN Zofran as well as PRN Tylenol for pain in left wrist during wound care change. Pt reports abrasion on forearm/wrist is painful \"in the bone\" appears swollen, no excess drainage noted on dressing change, no increased redness or heat. Pt asking for X-ray and this was communicated to next assigned RN, also provided with ice pack which he reports is minimally effective. Pt has been receiving PRN nicotine regularly, resistant to education on how this may exacerbate his nausea. Pt denies VH, reports anxiety is 8-9/10 prior to PRN Klonopin. Pt was started on Paxil this morning for increased depression, pt denies any medication SE today, has not reported a BM but did receive scheduled Miralax this morning. Pt affect is tense and flat but does brighten and joke at times with writer, pt has been staff and medication seeking this shift. Pt continues on SIO and no roommate order due to HI/SI. Will continue to monitor and support plan of care.                  "

## 2024-04-03 NOTE — PROGRESS NOTES
[facility], [place]   Psychiatric Progress Note        Interim History:     The patient's care was discussed with the treatment team during the daily team meeting and/or staff's chart notes were reviewed.  Staff report patient slept for 6.75 hours last night. Had an he was reported to do his best to not over utilize prn meds. No Self injurious behavior was reported. Was compliant with meds and care, more receptive to advices about needing to stay on SIO until he could contract for safety. Reported pain in his left arm where he scratched himself. Was seen by IM who recommended to cleanse wounds, use Bacitracin, use Mepilex dressing, ice and Tylenol. For more details, please, see Internal Medicine note. Appreciate medical team's input.     Met with patient: patient was seen in the conference room in presence of SIO staff. Today patient suddenly agreed to be seen in absence of female nurse. Was pretty pleasant and cooperative during our visit. Reported no change in Auditory hallucinations. Admitted that increase in anxiety makes him more depressed and, in turn, leads to thoughts of suicide and at times to Self injurious behavior. Realized that he had to break that [place]. Said that he would like to discuss what else could be done to help depression and anxiety. Said that he in the past was treated with antidepressants, but they were discontinued because of concern that they were triggering haven. We discussed that at this moment he is on multiple mood stabilizers including Li, Seroquel, Abilify, Fluphenazine shot and risk of inducing haven was low. Prakash agreed after talking about risks and benefits involved with increase in his Abilify dose and starting him on Paxil. We also agreed that for now he would continue on SIO and No roommate. Later on I was told by [facility] that patient agreed to go to IRTS. Patient EKG showed borderline, not elevated QTc - 446.         Medications:     Current  Facility-Administered Medications   Medication Dose Route Frequency Provider Last Rate Last Admin    amLODIPine (NORVASC) tablet 10 mg  10 mg Oral Daily Marco Dean MD   10 mg at 04/03/24 0819    amphetamine-dextroamphetamine (ADDERALL XR) ER cap 15 mg  15 mg Oral Daily Cortez Kruger MD   15 mg at 04/03/24 0818    [START ON 4/4/2024] ARIPiprazole (ABILIFY) tablet 5 mg  5 mg Oral QAM Cortez Kruger MD        bacitracin ointment   Topical BID Ashlyn Linton APRN CNP        benzoyl peroxide 5 % lotion   Topical Daily Cortez Kruger MD   Given at 04/03/24 0820    clindamycin (CLEOCIN T) 1 % lotion   Topical BID Cortez Kruger MD   Given at 04/03/24 0820    deutetrabenazine (AUSTEDO) tablet 6 mg  6 mg Oral Daily Helena Escobar APRN CNP   6 mg at 04/03/24 0818    doxycycline hyclate (VIBRAMYCIN) capsule 100 mg  100 mg Oral BID Marco Dean MD   100 mg at 04/03/24 0819    empagliflozin (JARDIANCE) tablet 10 mg  10 mg Oral Daily Marco Dean MD   10 mg at 04/03/24 0819    fish oil-omega-3 fatty acids capsule 1 g  1 g Oral Daily Cortez Kruger MD   1 g at 04/03/24 0819    fluPHENAZine decanoate (PROLIXIN) injection 25 mg  25 mg Intramuscular Q14 Days Helena Escobar APRN CNP   25 mg at 03/27/24 1223    insulin glargine (LANTUS PEN) injection 25 Units  25 Units Subcutaneous QAM AC Marco Dean MD   25 Units at 04/03/24 0820    lithium (ESKALITH CR/LITHOBID) CR tablet 900 mg  900 mg Oral At Bedtime Marco Dean MD   900 mg at 04/02/24 2055    magnesium gluconate (MAGONATE) tablet 500 mg  500 mg Oral Daily Cortez Kruger MD   500 mg at 04/03/24 0819    nicotine (NICODERM CQ) 21 MG/24HR 24 hr patch 1 patch  1 patch Transdermal Q24H Cortez Kruger MD   1 patch at 04/03/24 0821    PARoxetine (PAXIL) tablet 10 mg  10 mg Oral Daily Cortez Kruger MD   10 mg at 04/03/24 1108    polyethylene glycol (MIRALAX) Packet 17 g  17 g Oral  Daily Cortez Kruger MD   17 g at 04/03/24 0818    propranolol (INDERAL) tablet 10 mg  10 mg Oral TID Marco Dean MD   10 mg at 04/03/24 1435    QUEtiapine (SEROquel) tablet 200 mg  200 mg Oral At Bedtime Espinoza Rodriguez DO   200 mg at 04/02/24 2056    QUEtiapine (SEROquel) tablet 50 mg  50 mg Oral BID Cortez Kruger MD   50 mg at 04/03/24 0819    rosuvastatin (CRESTOR) tablet 40 mg  40 mg Oral Daily Marco Dean MD   40 mg at 04/03/24 0819    Semaglutide (RYBELSUS) tablet 7 mg  7 mg Oral Daily Valetnino Sarah MD   7 mg at 04/03/24 0820    testosterone (ANDROGEL/TESTIM) 50 MG/5GM (1%) topical gel 50 mg of testosterone  50 mg of testosterone Transdermal Daily Brooklyn Negro APRN CNP   50 mg of testosterone at 04/03/24 0819    zinc sulfate (ZINCATE) capsule 220 mg  220 mg Oral Daily Cortez Kruger MD   220 mg at 04/03/24 0819          Allergies:     Allergies   Allergen Reactions    Haldol [Haloperidol] Other (See Comments)     Makes patient very angry and anxious    Adhesive Tape Hives    Percocet [Oxycodone-Acetaminophen] Nausea and Vomiting    Prednisone Other (See Comments) and Hives     Suicidal ideation    Risperidone Other (See Comments)    Tramadol Hcl Nausea and Vomiting    Droperidol Anxiety    Seroquel [Quetiapine] Palpitations     Spent 2 weeks in the hospital due to having seroquel, caused palpitations and QT prolongation          Labs:     Recent Results (from the past 24 hour(s))   Glucose by meter    Collection Time: 04/02/24  5:47 PM   Result Value Ref Range    GLUCOSE BY METER POCT 99 70 - 99 mg/dL   EKG 12-lead, complete    Collection Time: 04/02/24  6:13 PM   Result Value Ref Range    Systolic Blood Pressure  mmHg    Diastolic Blood Pressure  mmHg    Ventricular Rate 62 BPM    Atrial Rate 62 BPM    MN Interval 198 ms    QRS Duration 102 ms     ms    QTc 446 ms    P Axis 20 degrees    R AXIS 26 degrees    T Axis 50 degrees     "Interpretation ECG       Sinus rhythm with sinus arrhythmia  Normal ECG  When compared with ECG of 17-NOV-2023 09:21,  Premature ventricular complexes are no longer Present  Confirmed by fellow Cathi Verdin ([id number]) on 4/3/2024 9:06:34 AM  Confirmed by MD GAIL, GERONIMO ([id number]) on 4/3/2024 1:12:28 PM     Glucose by meter    Collection Time: 04/02/24  9:03 PM   Result Value Ref Range    GLUCOSE BY METER POCT 135 (H) 70 - 99 mg/dL   Glucose by meter    Collection Time: 04/03/24  8:01 AM   Result Value Ref Range    GLUCOSE BY METER POCT 161 (H) 70 - 99 mg/dL   Glucose by meter    Collection Time: 04/03/24  1:46 PM   Result Value Ref Range    GLUCOSE BY METER POCT 106 (H) 70 - 99 mg/dL          Psychiatric Examination:     /76 (BP Location: Right arm, Patient Position: Sitting)   Pulse 70   Temp 97.9  F (36.6  C) (Oral)   Resp 16   Ht 1.676 m (5' 6\")   Wt 99.8 kg (220 lb)   SpO2 92%   BMI 35.51 kg/m    Weight is 220 lbs 0 oz  Body mass index is 35.51 kg/m .    Orthostatic Vitals         Most Recent      Sitting Orthostatic /71 04/03 0822    Sitting Orthostatic Pulse (bpm) 70 04/03 0822    Standing Orthostatic /79 04/03 0822    Standing Orthostatic Pulse (bpm) 81 04/03 0822           Appearance: dressed in hospital scrubs and moderately obese  Attitude:  somewhat cooperative  Eye Contact:  poor but slightly better  Mood:  still anxious, sad, less irritable  Affect:  constricted mobility  Speech:  decreased prosody and paucity of speech  Psychomotor Behavior:  no evidence of tardive dyskinesia, dystonia, or tics  Throught Process:  linear and goal oriented  Associations:  no loose associations  Thought Content:  passive suicidal ideation present, auditory hallucinations present, and reports thoughts of harming self and others  Insight:  limited  Judgement:  poor  Oriented to:  time, person, and place  Attention Span and Concentration:  limited  Recent and Remote Memory:  limited    Clinical Global " Impressions  First: 7/4 4/1/2024      Most recent:            Precautions:     Behavioral Orders   Procedures    Assault precautions    Code 1 - Restrict to Unit    Routine Programming     As clinically indicated    Status 15     Every 15 minutes.    Status Individual Observation     Patient SIO status reviewed with team/RN.  Please also refer to RN/team documentation for add'l detail.  SIO is for safety only, staff are not available for socialization and playing games or entertaining pt  -SIO staff to monitor following which have contributed to patient being on SIO:  Homicidal ideation  -Possible interventions SIO staff could use to support patient's treatment progress:  Monitor patient, medication administration  -When following observed, team will review discontinuation of SIO:  When HI resolves     Order Specific Question:   CONTINUOUS 24 hours / day     Answer:   Other     Order Specific Question:   Specify distance     Answer:   10 ft     Order Specific Question:   Indications for SIO     Answer:   Assault risk    Suicide precautions: Suicide Risk: MODERATE     Patients on Suicide Precautions should have a Combination Diet ordered that includes a Diet selection(s) AND a Behavioral Tray selection for Safe Tray - with utensils, or Safe Tray - NO utensils       Order Specific Question:   Suicide Risk     Answer:   MODERATE          DIagnoses:     Schizoaffective disorder bipolar type, current episode depressed, severe, with psychosis  Homicidal ideation  Borderline personality disorder  PTSD  ADHD, per history  Gender Dysphoria   Nicotine use disorder, severe, dependence  Cannabis use disorder, moderate, dependence  Opioid use disorder, moderate in early remission  Amphetamine use disorder, moderate   Ecstacy use disorder, 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 Some of it appears to be attention seeking, not of a genuine psychotic process. Will as per  discussion above increase Abilify dose, add melatonin and start on Paxil as of 4/3/2024. Will continue to provide support and structure and continue on SIO and no roommate order. Patient now agrees to go to IRTS and referrals will be made by CTC.      Total time spent was 38 minutes. Over 50% of times was spent counseling and coordination of care regarding coping skills, medication and discharge planning.

## 2024-04-03 NOTE — PLAN OF CARE
Goal Outcome Evaluation:    Writer pointed out that pt had a good group earlier with OT, and then they smiled and said they did, presented opportunity to talk about black and white thinking / cognitive distortions. Pt had asked for topic of self compassion, they were somewhat engaged, talking about their dog etc. They said they felt like a bad person they were not home caring for dog. Writer worked with them on a re-frame.     They are willing to learn skills but have a difficult time implementing skills independently. They are very reliant on SIO staff. Pt left early and writer then ended group early as all the pts were not focused. Pt was sitting in the hallway and said they felt like they were going to throw up.     Pt has independent coloring supplies in their room. Writer noticed the plastic sleeve for the markers is sharp, it might be advised to give them a pencil box instead of leaving them in the plastic sleeve if there is an order to use these materials in their room. They have reported small events of SIB, scratching until they draw blood, hence writer was wondering about the safety of the packaging.

## 2024-04-04 ENCOUNTER — APPOINTMENT (OUTPATIENT)
Dept: GENERAL RADIOLOGY | Facility: CLINIC | Age: 34
End: 2024-04-04
Attending: PHYSICIAN ASSISTANT
Payer: MEDICARE

## 2024-04-04 LAB
GLUCOSE BLDC GLUCOMTR-MCNC: 105 MG/DL (ref 70–99)
GLUCOSE BLDC GLUCOMTR-MCNC: 132 MG/DL (ref 70–99)
GLUCOSE BLDC GLUCOMTR-MCNC: 71 MG/DL (ref 70–99)

## 2024-04-04 PROCEDURE — 250N000013 HC RX MED GY IP 250 OP 250 PS 637: Performed by: PSYCHIATRY & NEUROLOGY

## 2024-04-04 PROCEDURE — 99232 SBSQ HOSP IP/OBS MODERATE 35: CPT | Performed by: PSYCHIATRY & NEUROLOGY

## 2024-04-04 PROCEDURE — 124N000002 HC R&B MH UMMC

## 2024-04-04 PROCEDURE — 250N000013 HC RX MED GY IP 250 OP 250 PS 637: Performed by: STUDENT IN AN ORGANIZED HEALTH CARE EDUCATION/TRAINING PROGRAM

## 2024-04-04 PROCEDURE — G0177 OPPS/PHP; TRAIN & EDUC SERV: HCPCS

## 2024-04-04 PROCEDURE — 250N000013 HC RX MED GY IP 250 OP 250 PS 637

## 2024-04-04 PROCEDURE — 250N000013 HC RX MED GY IP 250 OP 250 PS 637: Performed by: EMERGENCY MEDICINE

## 2024-04-04 PROCEDURE — 90832 PSYTX W PT 30 MINUTES: CPT

## 2024-04-04 PROCEDURE — 250N000011 HC RX IP 250 OP 636: Performed by: PSYCHIATRY & NEUROLOGY

## 2024-04-04 PROCEDURE — 99231 SBSQ HOSP IP/OBS SF/LOW 25: CPT | Performed by: PHYSICIAN ASSISTANT

## 2024-04-04 PROCEDURE — 73130 X-RAY EXAM OF HAND: CPT | Mod: LT

## 2024-04-04 PROCEDURE — A9270 NON-COVERED ITEM OR SERVICE: HCPCS | Performed by: NURSE PRACTITIONER

## 2024-04-04 PROCEDURE — 250N000013 HC RX MED GY IP 250 OP 250 PS 637: Performed by: NURSE PRACTITIONER

## 2024-04-04 RX ORDER — FLUPHENAZINE HYDROCHLORIDE 1 MG/1
2 TABLET ORAL 2 TIMES DAILY
Status: DISCONTINUED | OUTPATIENT
Start: 2024-04-04 | End: 2024-04-12 | Stop reason: HOSPADM

## 2024-04-04 RX ADMIN — Medication 5 MG: at 19:31

## 2024-04-04 RX ADMIN — QUETIAPINE FUMARATE 200 MG: 200 TABLET ORAL at 19:31

## 2024-04-04 RX ADMIN — DOCUSATE SODIUM 100 MG: 50 CAPSULE, LIQUID FILLED ORAL at 12:40

## 2024-04-04 RX ADMIN — NICOTINE POLACRILEX 4 MG: 4 GUM, CHEWING BUCCAL at 18:16

## 2024-04-04 RX ADMIN — CLINDAMYCIN PHOSPHATE: 10 LOTION TOPICAL at 19:32

## 2024-04-04 RX ADMIN — DOXYCYCLINE HYCLATE 100 MG: 100 CAPSULE ORAL at 19:31

## 2024-04-04 RX ADMIN — NICOTINE POLACRILEX 4 MG: 4 GUM, CHEWING BUCCAL at 12:40

## 2024-04-04 RX ADMIN — CLONAZEPAM 0.25 MG: 0.5 TABLET ORAL at 14:27

## 2024-04-04 RX ADMIN — Medication 1 G: at 08:37

## 2024-04-04 RX ADMIN — NICOTINE POLACRILEX 4 MG: 4 GUM, CHEWING BUCCAL at 16:02

## 2024-04-04 RX ADMIN — ONDANSETRON 4 MG: 4 TABLET, FILM COATED ORAL at 14:27

## 2024-04-04 RX ADMIN — FLUPHENAZINE HYDROCHLORIDE 2 MG: 1 TABLET, FILM COATED ORAL at 12:38

## 2024-04-04 RX ADMIN — PAROXETINE HYDROCHLORIDE 10 MG: 10 TABLET, FILM COATED ORAL at 08:38

## 2024-04-04 RX ADMIN — DEXTROAMPHETAMINE SULFATE, DEXTROAMPHETAMINE SACCHARATE, AMPHETAMINE SULFATE AND AMPHETAMINE ASPARTATE 15 MG: 3.75; 3.75; 3.75; 3.75 CAPSULE, EXTENDED RELEASE ORAL at 08:38

## 2024-04-04 RX ADMIN — ZINC SULFATE 220 MG (50 MG) CAPSULE 220 MG: CAPSULE at 08:38

## 2024-04-04 RX ADMIN — NICOTINE POLACRILEX 4 MG: 4 GUM, CHEWING BUCCAL at 08:37

## 2024-04-04 RX ADMIN — INSULIN GLARGINE 25 UNITS: 100 INJECTION, SOLUTION SUBCUTANEOUS at 08:40

## 2024-04-04 RX ADMIN — NICOTINE POLACRILEX 4 MG: 4 GUM, CHEWING BUCCAL at 21:07

## 2024-04-04 RX ADMIN — LITHIUM CARBONATE 900 MG: 450 TABLET, EXTENDED RELEASE ORAL at 19:31

## 2024-04-04 RX ADMIN — BACITRACIN: 500 OINTMENT TOPICAL at 10:20

## 2024-04-04 RX ADMIN — AMLODIPINE BESYLATE 10 MG: 10 TABLET ORAL at 08:37

## 2024-04-04 RX ADMIN — NICOTINE POLACRILEX 4 MG: 4 GUM, CHEWING BUCCAL at 14:27

## 2024-04-04 RX ADMIN — NICOTINE 1 PATCH: 21 PATCH, EXTENDED RELEASE TRANSDERMAL at 08:38

## 2024-04-04 RX ADMIN — DOXYCYCLINE HYCLATE 100 MG: 100 CAPSULE ORAL at 08:38

## 2024-04-04 RX ADMIN — ARIPIPRAZOLE 5 MG: 5 TABLET ORAL at 08:38

## 2024-04-04 RX ADMIN — TESTOSTERONE 50 MG OF TESTOSTERONE: 50 GEL TRANSDERMAL at 08:38

## 2024-04-04 RX ADMIN — Medication 500 MG: at 08:37

## 2024-04-04 RX ADMIN — PROPRANOLOL HYDROCHLORIDE 10 MG: 10 TABLET ORAL at 08:37

## 2024-04-04 RX ADMIN — NICOTINE POLACRILEX 4 MG: 4 GUM, CHEWING BUCCAL at 10:06

## 2024-04-04 RX ADMIN — EMPAGLIFLOZIN 10 MG: 10 TABLET, FILM COATED ORAL at 08:37

## 2024-04-04 RX ADMIN — NICOTINE POLACRILEX 4 MG: 4 GUM, CHEWING BUCCAL at 19:31

## 2024-04-04 RX ADMIN — PROPRANOLOL HYDROCHLORIDE 10 MG: 10 TABLET ORAL at 14:03

## 2024-04-04 RX ADMIN — POLYETHYLENE GLYCOL 3350 17 G: 17 POWDER, FOR SOLUTION ORAL at 08:37

## 2024-04-04 RX ADMIN — BACITRACIN: 500 OINTMENT TOPICAL at 19:40

## 2024-04-04 RX ADMIN — PROPRANOLOL HYDROCHLORIDE 10 MG: 10 TABLET ORAL at 19:31

## 2024-04-04 RX ADMIN — FLUPHENAZINE HYDROCHLORIDE 2 MG: 1 TABLET, FILM COATED ORAL at 19:32

## 2024-04-04 RX ADMIN — QUETIAPINE FUMARATE 50 MG: 50 TABLET ORAL at 19:31

## 2024-04-04 RX ADMIN — DOCUSATE SODIUM 100 MG: 50 CAPSULE, LIQUID FILLED ORAL at 19:31

## 2024-04-04 RX ADMIN — ROSUVASTATIN 40 MG: 20 TABLET, FILM COATED ORAL at 08:37

## 2024-04-04 RX ADMIN — DEUTETRABENAZINE 6 MG: 6 TABLET, COATED ORAL at 08:39

## 2024-04-04 RX ADMIN — QUETIAPINE FUMARATE 50 MG: 50 TABLET ORAL at 08:37

## 2024-04-04 ASSESSMENT — ACTIVITIES OF DAILY LIVING (ADL)
ADLS_ACUITY_SCORE: 55
HYGIENE/GROOMING: INDEPENDENT
LAUNDRY: WITH SUPERVISION
ADLS_ACUITY_SCORE: 55
ORAL_HYGIENE: INDEPENDENT
ADLS_ACUITY_SCORE: 55
DRESS: INDEPENDENT
ADLS_ACUITY_SCORE: 55
HYGIENE/GROOMING: INDEPENDENT
DRESS: INDEPENDENT
LAUNDRY: WITH SUPERVISION
ORAL_HYGIENE: INDEPENDENT
ADLS_ACUITY_SCORE: 55

## 2024-04-04 NOTE — PLAN OF CARE
04/04/24 1257   Group Therapy Session   Group Attendance attended group session   Time Session Began 1015   Time Session Ended 1100   Total Time (minutes) 25 - no charge   Total # Attendees 5   Group Type life skill;task skill;other (see comments)  (OT)   Group Topic Covered balanced lifestyle;coping skills/lifestyle management;leisure exploration/use of leisure time;structured socialization   Group Session Detail OT Clinic: Activity group for creative expression, promoting autonomy, building self worth, coping with stress and symptoms, and an opportunity to exercise cognitive skills (I.e. initiation, planning, organizing, sequencing, sustained attention, follow through, and overall self awareness).   Patient Response/Contribution able to recall/repeat info presented;discussed personal experience with topic;expressed understanding of topic   Patient Participation Detail Pt chose to continue with new type of creative activity he had started the previous session. Was pleasant and calm. Focused on the activity with less socializing this session and ended early. Apologized to staff that it was shorter time period.

## 2024-04-04 NOTE — PLAN OF CARE
04/04/24 1403   Group Therapy Session   Group Attendance attended group session   Time Session Began 1300   Time Session Ended 1345   Total Time (minutes) 45   Total # Attendees 5   Group Type life skill;other (see comments)  (OT)   Group Topic Covered balanced lifestyle;cognitive activities;leisure exploration/use of leisure time;structured socialization   Group Session Detail OT - Topic:  Focus of group activity was use of cognitive techniques through word associations with working toward matching other players. Also opportunity to practice socialization and ways to support others through enjoyable leisure activities.   Patient Response/Contribution able to recall/repeat info presented;cooperative with task;discussed personal experience with topic;expressed understanding of topic;listened actively   Patient Participation Detail Pt came to group with minimal encouragement from other patients already in the group. He easily engaged in the activity, following directions well. Affect was bright and utilized humor with others. Frequently was able to match others and smiled widely when he was getting the most matches of all in the group. Did note cognitive benefits of the game at the end of group.                             5-Fu Counseling: 5-Fluorouracil Counseling:  I discussed with the patient the risks of 5-fluorouracil including but not limited to erythema, scaling, itching, weeping, crusting, and pain.

## 2024-04-04 NOTE — PLAN OF CARE
Team Note Due:  Friday    Assessment/Intervention/Current Symtoms and Care Coordination:  Chart review, discussed pt progress, symptomology, and response to treatment.  Discussed the discharge plan and any potential impediments to discharge.    Tasks Completed:  - [facility] received email from Kait with [employer] asking about commitment status and pt's current insurance. [facility] noted that pt's PMAP lapsed on 3/31/2024. [facility] provided update to Kait regarding commitment status and pt's insurance.  - [facility] sent message to financial counselor team to request follow-up with pt regarding lapsed MA coverage. Sherley will follow-up with pt.  - Pt asked to meet with [facility]. Pt reported he spoke to his group home who reported that pt's place in the group home will be held for 30 days after discharge but would not be able to hold it for 90 days. Pt expressed concern regarding IRTS placement, as a result. [facility] shared that pt's PMAP lapsed at the end of March and financial counselors were notified to follow-up with pt to assist with this. Pt reported that his  had recently submitted paperwork in January and expressed frustration that it lapsed so quickly. CTC will follow-up with pt's .   - Pt asked to meet with [facility] to discuss discharge plans. [facility] met with pt and SIO staff in conference room. Pt asked CTC what is recommended due to concern regarding losing group home placement and IRTS. CTC shared that concerns regarding pt's group home status will be shared with IRTS placement but noted that there are no guarantees regarding length of stay. CTC shared that the current barrier to IRTS placement is the lapse in coverage for pt's PMAP. Pt shared that he is still wanting IRTS placement and this is his first choice of placement currently due to his difficulty with following through with outpatient supports. CTC inquired about pt's difficulty with following through and asked what the barrier has been in the past. Pt reported  "that he struggles with groups and attended an intensive outpatient program in the past and felt that the groups were more focused on \"not feeling alone\" and pt reported that he didn't find that helpful. [facility] shared that IRTS is heavily group focused and asked about pt's experience there in the past and whether he found this helpful. Pt reported that he did find IRTS programs helpful in the past as the groups focused more on skills to help manage symptoms, such as DBT skills. CTC shared that following IRTS placement, the recommendation would be for pt to attend DBT programming given pt's reported desire to learn more skills-based methods to manage symptoms. CTC shared that if pt does attend IRTS, he would likely not be able to participate in the [facility] group he has an intake for on April 8. Pt reported that he doesn't feel this is as urgent as he is currently sober and doesn't feel he needs as many supports around this currently. CTC shared that the current focus is on ensuring pt's coverage is reinstated so that pt can attend IRTS. Pt reported he understood.  - Left message for pt's .    Discharge Plan or Goal:  Current plan is to stabilize symptoms and develop safe disposition plan. Following hospitalization, plan is for IRTS or group home with outpatient support.     Barriers to Discharge:  Pt continues to endorse command hallucinations to harm others. Pt reports he will not act on these. Pt remains on 1:1 SIO due to HI. Further stabilization needed.     Referral Status:    IRTS Referrals:  [facility] Central Intake - Submitted 4/3/2024  Contact: Kait Noonan,   Direct dial: [phone]/fax [phone]  Direct email: [email or web address]  General Email: [email or web address]    [facility] - Submitted 4/3/2024  [place]: [phone]  Fax: [phone]   [facility]: [phone]  Fax: [phone]     Legal Status:  Revoked "    County: [place]  File Number: [id number]    Contacts:  Psychiatrist/Medication Management:  Name/Organization: Regine Last, CNP, APRN [facility]  Phone: [phone]  Fax: [phone]    Therapist:  Name/Organization: Joshua  [facility] - VAL signed  Phone: [phone]     :  Name/Organization: Cristy Ji  [facility] - VAL signed  Phone: [phone]    Group Home:  Name/Organization: [facility]  [facility] - [place] signed  Phone: [phone]    [facility]:  Name/Organization: [facility] - VAL signed  Phone: [phone]     CADI Worker:  Name/Organization: Pretty Guzman  [facility] - VAL signed  Phone: [phone]     Upcoming Meetings and Dates/Important Information and next steps:  None      Pt is a restricted recipient:  Per chart: Insurance Type: Medicaid  [facility] [id number]     Pt is restricted Provider number [id number], [facility] is a provider for a Restricted Recipient for: Pharmacy    Provider number [id number], JENNIFER LEY is a provider for a Restricted Recipient for: Physician Services ,  Nurse Practitioner Services    Provider number [id number], [facility] is a provider for a Restricted Recipient for: Physician Services ,  Inpatient Hospital ,  Diagnostic Lab ,  Outpatient Hospital    Provider number [id number], [facility] is a provider for a Restricted   RRP Referral Needed within 90 days of Service

## 2024-04-04 NOTE — PLAN OF CARE
"Patient has spent time going in between there room and the milieu. Patient reported hearing voices that were telling him to harm others. Reported feeling anxious, depressed and suicidal. Requested prn's of prolixin, hydroxyzine and Zyprexa. Also states that they have not had a bowl movement in 3 days. On call provider paged and colace at patient's request was ordered and given prn senna. Patient appeared very uncomfortable and stated that he was having a lot of self harm thoughts. Patient was redirected and staff offered to get headphones or a Nintendo switch to distract himself. Affect has been flat and tense. Requested to have an x-ray of his left hand done where he has 2 wounds. The mipelix bandage was removed and gauze place due to some reddening appearing. Ate dinner and snack. Med compliant.     Patient evaluation continues. Assessed mood,anxiety,thoughts and behavior.     Patient gradually progressing towards goals.    Patient is encouraged to participate in groups and assisted to develop healthy coping skills.     VS reviewed: /77   Pulse 63   Temp 97.9  F (36.6  C) (Oral)   Resp 16   Ht 1.676 m (5' 6\")   Wt 99.8 kg (220 lb)   SpO2 97%   BMI 35.51 kg/m      Length of stay: 7    Refer to daily team meeting notes for individualized plan of care. Nursing will continue to assess.    "

## 2024-04-04 NOTE — PLAN OF CARE
"Goal Outcome Evaluation:    Individual Therapy Note      Date of Service: April 4, 2024    Patient: Prakash goes by \"Prakash,\" uses they/them pronouns    Individuals Present: NATHAN Mott    Session start: 12:00 pm  Session end: 12:30 pm  Session duration in minutes: 30    Patient Active Problem List   Diagnosis    ADHD (attention deficit hyperactivity disorder)    Bipolar 1 disorder, manic, mild    Marijuana abuse    Polysubstance abuse (H)    GERD (gastroesophageal reflux disease)    Tobacco abuse    Intractable back pain    Optic neuritis    Cauda equina syndrome with neurogenic bladder (H)    Schizoaffective disorder, bipolar type (H)    PTSD (post-traumatic stress disorder)    Anxiety    Auditory hallucination    Nephrolithiasis    Cyst of left ovary    Borderline personality disorder (H)    Cannabis use disorder, severe, dependence (H)    Depression    Episodic mood disorder (H24)    History of heroin abuse (H)    Moderate episode of recurrent major depressive disorder (H)    Opioid use disorder, severe, dependence (H)    Substance-induced psychotic disorder with hallucinations (H)    Nausea    Urinary retention    Chronic bilateral low back pain without sciatica    AVA (generalized anxiety disorder)    Aggressive behavior    Gender identity disorder    Lumbosacral radiculopathy at L5    DUB (dysfunctional uterine bleeding)    Seizure-like activity (H)    Acanthosis nigricans    Schizoaffective disorder, chronic condition with acute exacerbation (H)    Bipolar affective disorder, mixed, severe, with psychotic behavior (H)    Schizophrenia, schizoaffective, chronic with acute exacerbation (H)    Akathisia    Hypertension, unspecified type    Female-to-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 person    Morbid obesity (H)    Diabetes mellitus, type 2 (H)    Mood disorder due to a general medical condition    Pain of right hand    Acne vulgaris         Modality Used:DBT, Person Centered, Rapport Building, Motivational " Interviewing, Brief Therapy, and Solution Focused    Goals: Replace self harm with grounding and art solutions, goal to get off of SIO and be more independent    Patient Description of current symptoms: strong urges to harm self and others, feels shame for these intrusive thoughts     Mental Status Exam:   Attitude: cooperative  Eye Contact: fair  Mood: anxious and depressed  Affect: intensity is blunted and intensity is flat  Speech: clear, coherent  Psychomotor Behavior: no evidence of tardive dyskinesia, dystonia, or tics  Thought Process:  goal oriented  Associations: no loose associations  Thought Content: thoughts of self-harm, which are remained the same and homicidal intrusive thoughts, they say it is command hallucinations, writer sees OCD component to intrusive thoughts  Insight: limited  Judgement: limited  Attention Span and Concentration: intact    Pt progress: Pt is open to skills they filled out their support and coping plan in their folder independently. They are willing to try grounding and art skills next time they feel like self harming. They said codes and loudness on the unit are triggering.    Treatment Objective(s) Addressed:   The focus of this session was on identifying and practicing coping strategies, processing feelings related to self harm urges, going to an IRTS before returning to group home, safety planning, identifying an appropriate aftercare plan, building distress tolerance, assessing safety, building self-esteem, and building skills in distress tolerance grounding and mindful art to try to replace self harm urges      Progress Towards Goals and Assessment of Patient:   Patient is making progress towards treatment goals as evidenced by willingness to engage and try replacement skills for self harm behavior.       Therapeutic Intervention(s):   Provided active listening, unconditional positive regard, and validation. Engaged in cognitive restructuring/ reframing, looked at common  cognitive distortions and challenged negative thoughts. Engaged in guided discovery, explored patient's perspectives and helped expand them through socratic dialogue. Engaged in activity scheduling and behavioral activation, looking at and reviewing the prior week's goals, problem solving any barriers and acknowledging successes, as well as setting new goals. Coached on coping techniques/relaxation skills to help improve distress tolerance and managing intense emotions. Taught the link between thoughts, feelings, and behaviors. Identified and practiced coping skills. Engaged in relaxation training (e.g. meditation, progressive muscle relaxation, etc.). Identified stress relief practices.    Plan/next step: meet again tomorrow and check in how skills worked in evening shift. Also make some additions to safety plan    16927 - Psychotherapy (with patient) - 30 (16-37*) min

## 2024-04-04 NOTE — PLAN OF CARE
Night Nursing Note   4/3/24 - 4/4/24        Prakash was in bed at beginning of shift and appeared asleep.  Monitored on SIO @ 10ft and q15 mins for safety.  Appeared to sleep majority of night without difficulty.  Continue to monitor, offer support and reassurance per care plan.    Slept 6.75 hrs.

## 2024-04-04 NOTE — PLAN OF CARE
BEH IP Unit Acuity Rating Score (UARS)  Patient is given one point for every criteria they meet.    CRITERIA SCORING   On a 72 hour hold, court hold, committed, stay of commitment, or revocation. 1/1    Patient LOS on BEH unit exceeds 20 days. 0/1  LOS: 8   Patient under guardianship, 55+, otherwise medically complex, or under age 11. 0/1   Suicide ideation without relief of precipitating factors. 0/1   Current plan for suicide. 0/1   Current plan for homicide. 0/1   Imminent risk or actual attempt to seriously harm another without relief of factors precipitating the attempt. 0/1   Severe dysfunction in daily living (ex: complete neglect for self care, extreme disruption in vegetative function, extreme deterioration in social interactions). 0/1   Recent (last 7 days) or current physical aggression in the ED or on unit. 0/1   Restraints or seclusion episode in past 72 hours. 0/1   Recent (last 7 days) or current verbal aggression, agitation, yelling, etc., while in the ED or unit. 0/1   Active psychosis. 1/1   Need for constant or near constant redirection (from leaving, from others, etc).  0/1   Intrusive or disruptive behaviors. 0/1   Patient requires 3 or more hours of individualized nursing care per 8-hour shift (i.e. for ADLs, meds, therapeutic interventions). 1/1   TOTAL 3

## 2024-04-04 NOTE — PROGRESS NOTES
"[facility], [place]   Psychiatric Progress Note        Interim History:     The patient's care was discussed with the treatment team during the daily team meeting and/or staff's chart notes were reviewed.  Staff report patient again slept for 6.75 hours last night. He has been compliant with meds and care, continued on SIO and No Roommate order. No Self injurious behavior or inappropriate behavior was reported. New wound care directions are received. The wound is superficial with irregular round edges. The center abrasion is red. There is no drainage. The area is cleaned with normal saline. Dried with a 2X2. Bacitracin is applied and covered with a large Band-Aid.     Met with patient: patient was seen in the conference room in presence of SIO staff. Presented with his typical blunted affect, limited eye contact. Said that mood was somewhat better today and he didn't feel as anxious as before. Denied presence of active Suicidal ideation. He at the same time, told me that he saw no change in an intensity of Auditory hallucinations: \"they still tell me to stab you with your pen\". Said that he would not do that. I asked to describe voices. Prakash tried to do so, quickly got frustrated and, then, told me that he could not do that: \"I have difficulties finding the right words\". When asked what historically was more helpful with voices, told me that it was Prolixin. We discussed that Prolixin as an older med has more risk of extrapyramidal side effects. Prakash said that he understood that, still wanted to be put on an low dose of scheduled Prolixin which I agreed to do. Prakash thanked me and had no more questions or concerns. He throughout our visit didn't make an impression of someone attending to internal stimuli.          Medications:     Current Facility-Administered Medications   Medication Dose Route Frequency Provider Last Rate Last Admin    amLODIPine (NORVASC) tablet 10 mg  10 mg Oral " Daily Marco Dean MD   10 mg at 04/04/24 0837    amphetamine-dextroamphetamine (ADDERALL XR) ER cap 15 mg  15 mg Oral Daily Cortez Kruger MD   15 mg at 04/04/24 0838    ARIPiprazole (ABILIFY) tablet 5 mg  5 mg Oral QAM Cortez Kruger MD   5 mg at 04/04/24 0838    bacitracin ointment   Topical BID Ashlyn Linton APRN CNP   Given at 04/04/24 1020    benzoyl peroxide 5 % lotion   Topical Daily Cortez Kruger MD   Given at 04/03/24 0820    clindamycin (CLEOCIN T) 1 % lotion   Topical BID Cortez Kruger MD   Given at 04/03/24 1922    deutetrabenazine (AUSTEDO) tablet 6 mg  6 mg Oral Daily Helena Escobar APRN CNP   6 mg at 04/04/24 0839    doxycycline hyclate (VIBRAMYCIN) capsule 100 mg  100 mg Oral BID Marco Dean MD   100 mg at 04/04/24 0838    empagliflozin (JARDIANCE) tablet 10 mg  10 mg Oral Daily Marco Dean MD   10 mg at 04/04/24 0837    fish oil-omega-3 fatty acids capsule 1 g  1 g Oral Daily Cortez Kruger MD   1 g at 04/04/24 0837    fluPHENAZine (PROLIXIN) tablet 2 mg  2 mg Oral BID Cortez Kruger MD   2 mg at 04/04/24 1238    fluPHENAZine decanoate (PROLIXIN) injection 25 mg  25 mg Intramuscular Q14 Days Helena Escobar APRN CNP   25 mg at 03/27/24 1223    insulin glargine (LANTUS PEN) injection 25 Units  25 Units Subcutaneous QAM AC Marco Dean MD   25 Units at 04/04/24 0840    lithium (ESKALITH CR/LITHOBID) CR tablet 900 mg  900 mg Oral At Bedtime Marco Dean MD   900 mg at 04/03/24 1921    magnesium gluconate (MAGONATE) tablet 500 mg  500 mg Oral Daily Cortez Kruger MD   500 mg at 04/04/24 0837    nicotine (NICODERM CQ) 21 MG/24HR 24 hr patch 1 patch  1 patch Transdermal Q24H Cortez Kruger MD   1 patch at 04/04/24 0838    PARoxetine (PAXIL) tablet 10 mg  10 mg Oral Daily Cortez Kruger MD   10 mg at 04/04/24 0838    polyethylene glycol (MIRALAX) Packet 17 g  17 g Oral Daily Saskia  "Cortez TOM MD   17 g at 04/04/24 0837    propranolol (INDERAL) tablet 10 mg  10 mg Oral TID Marco Dean MD   10 mg at 04/04/24 1403    QUEtiapine (SEROquel) tablet 200 mg  200 mg Oral At Bedtime Jennifer Espinoza GillisDO   200 mg at 04/03/24 1921    QUEtiapine (SEROquel) tablet 50 mg  50 mg Oral BID Cortez Kruger MD   50 mg at 04/04/24 0837    rosuvastatin (CRESTOR) tablet 40 mg  40 mg Oral Daily Marco Dean MD   40 mg at 04/04/24 0837    Semaglutide (RYBELSUS) tablet 7 mg  7 mg Oral Daily Valentino Sarah MD   7 mg at 04/04/24 0840    testosterone (ANDROGEL/TESTIM) 50 MG/5GM (1%) topical gel 50 mg of testosterone  50 mg of testosterone Transdermal Daily Brooklyn Negro APRN CNP   50 mg of testosterone at 04/04/24 0838    zinc sulfate (ZINCATE) capsule 220 mg  220 mg Oral Daily Cortez Kruger MD   220 mg at 04/04/24 0838          Allergies:     Allergies   Allergen Reactions    Haldol [Haloperidol] Other (See Comments)     Makes patient very angry and anxious    Adhesive Tape Hives    Percocet [Oxycodone-Acetaminophen] Nausea and Vomiting    Prednisone Other (See Comments) and Hives     Suicidal ideation    Risperidone Other (See Comments)    Tramadol Hcl Nausea and Vomiting    Droperidol Anxiety    Seroquel [Quetiapine] Palpitations     Spent 2 weeks in the hospital due to having seroquel, caused palpitations and QT prolongation          Labs:     Recent Results (from the past 24 hour(s))   Glucose by meter    Collection Time: 04/03/24  5:42 PM   Result Value Ref Range    GLUCOSE BY METER POCT 83 70 - 99 mg/dL   Glucose by meter    Collection Time: 04/04/24  8:24 AM   Result Value Ref Range    GLUCOSE BY METER POCT 132 (H) 70 - 99 mg/dL          Psychiatric Examination:     /70 (BP Location: Right arm)   Pulse 65   Temp 98.5  F (36.9  C) (Oral)   Resp 16   Ht 1.676 m (5' 6\")   Wt 99.8 kg (220 lb)   SpO2 95%   BMI 35.51 kg/m    Weight is 220 lbs 0 oz  " Body mass index is 35.51 kg/m .    Orthostatic Vitals         Most Recent      Sitting Orthostatic /84 04/04 0931    Sitting Orthostatic Pulse (bpm) 75 04/04 0931    Standing Orthostatic /78 04/04 0931    Standing Orthostatic Pulse (bpm) 92 04/04 0931           Appearance: dressed in hospital scrubs and moderately obese  Attitude: more cooperative  Eye Contact: poor but slightly better  Mood:  still anxious, sad, less irritable  Affect:  blunted  Speech:  decreased prosody and paucity of speech  Psychomotor Behavior:  no evidence of tardive dyskinesia, dystonia, or tics  Throught Process:  linear and goal oriented  Associations:  no loose associations  Thought Content:  passive suicidal ideation present, auditory hallucinations present, and reports thoughts of harming self and others  Insight:  limited  Judgement:  poor  Oriented to:  time, person, and place  Attention Span and Concentration:  limited  Recent and Remote Memory:  limited    Clinical Global Impressions  First: 7/4 4/1/2024      Most recent:            Precautions:     Behavioral Orders   Procedures    Assault precautions    Code 1 - Restrict to Unit    Code 2     For imaging with .    Routine Programming     As clinically indicated    Status 15     Every 15 minutes.    Status Individual Observation     Patient SIO status reviewed with team/RN.  Please also refer to RN/team documentation for add'l detail.  SIO is for safety only, staff are not available for socialization and playing games or entertaining pt  -SIO staff to monitor following which have contributed to patient being on SIO:  Homicidal ideation  -Possible interventions SIO staff could use to support patient's treatment progress:  Monitor patient, medication administration  -When following observed, team will review discontinuation of SIO:  When HI resolves     Order Specific Question:   CONTINUOUS 24 hours / day     Answer:   Other     Order Specific Question:    Specify distance     Answer:   10 ft     Order Specific Question:   Indications for SIO     Answer:   Assault risk    Suicide precautions: Suicide Risk: MODERATE     Patients on Suicide Precautions should have a Combination Diet ordered that includes a Diet selection(s) AND a Behavioral Tray selection for Safe Tray - with utensils, or Safe Tray - NO utensils       Order Specific Question:   Suicide Risk     Answer:   MODERATE          DIagnoses:     Schizoaffective disorder bipolar type, current episode depressed, severe, with psychosis  Homicidal ideation  Borderline personality disorder  PTSD  ADHD, per history  Gender Dysphoria   Nicotine use disorder, severe, dependence  Cannabis use disorder, moderate, dependence  Opioid use disorder, moderate in early remission  Amphetamine use disorder, moderate   Ecstacy use disorder, 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 Some of it appears to be attention seeking, not of a genuine psychotic process. Will as per discussion above start on scheduled Prolixin as of 4/4/2024. Will continue to provide support and structure and continue on SIO and no roommate order. Patient now agrees to go to IRTS and referrals will be made by CTC.      Total time spent was 36 minutes. Over 50% of times was spent counseling and coordination of care regarding coping skills, medication and discharge planning.

## 2024-04-04 NOTE — PLAN OF CARE
Problem: Adult Behavioral Health Plan of Care  Goal: Plan of Care Review  Recent Flowsheet Documentation  Taken 4/4/2024 1300 by Tania Malik RN  Patient Agreement with Plan of Care: agrees   Goal Outcome Evaluation:    Plan of Care Reviewed With: patient               Presentation: The patient continues on SIO d/t history of SIB and HI.  The patient has a flat and blunted affect.f  Speech is clear, soft, and quiet.  Mood is calm and cooperative until late afternoon.     Mental health symptoms: The patient denies SI, SIB, HI, and hallucinations.  Endorses general anxiety.  Stated he was having a good day and felt good.  Late afternoon, the patient requested clonidine and Zofran.  Patient complained of anxiety and nausea.  The patient stated, he gets anxious when discussing discharge planning.      Medication compliance: The patient is complaint with all medication. Patient is educated on medication changes.  Please refer to the EMAR for details.     Medical Concerns: The patient has wound care to the left hand.  The patient is seen by IM.  New wound care directions are received.  The wound is superficial with irregular round edges.  The center abrasion is red.  There is no drainage.  The area is cleaned with normal saline.  Dried with a 2X2.  Bacitracin is applied and covered with a large Band-Aid.       Precautions: suicidal, sib, and assault.     Continue with plan of care

## 2024-04-05 LAB
GLUCOSE BLDC GLUCOMTR-MCNC: 175 MG/DL (ref 70–99)
GLUCOSE BLDC GLUCOMTR-MCNC: 78 MG/DL (ref 70–99)

## 2024-04-05 PROCEDURE — G0177 OPPS/PHP; TRAIN & EDUC SERV: HCPCS

## 2024-04-05 PROCEDURE — 250N000013 HC RX MED GY IP 250 OP 250 PS 637: Performed by: STUDENT IN AN ORGANIZED HEALTH CARE EDUCATION/TRAINING PROGRAM

## 2024-04-05 PROCEDURE — 250N000013 HC RX MED GY IP 250 OP 250 PS 637: Performed by: EMERGENCY MEDICINE

## 2024-04-05 PROCEDURE — 250N000013 HC RX MED GY IP 250 OP 250 PS 637: Performed by: PSYCHIATRY & NEUROLOGY

## 2024-04-05 PROCEDURE — 250N000013 HC RX MED GY IP 250 OP 250 PS 637: Performed by: NURSE PRACTITIONER

## 2024-04-05 PROCEDURE — 250N000013 HC RX MED GY IP 250 OP 250 PS 637

## 2024-04-05 PROCEDURE — 99232 SBSQ HOSP IP/OBS MODERATE 35: CPT | Performed by: PSYCHIATRY & NEUROLOGY

## 2024-04-05 PROCEDURE — A9270 NON-COVERED ITEM OR SERVICE: HCPCS | Performed by: NURSE PRACTITIONER

## 2024-04-05 PROCEDURE — 124N000002 HC R&B MH UMMC

## 2024-04-05 PROCEDURE — 90853 GROUP PSYCHOTHERAPY: CPT

## 2024-04-05 RX ORDER — CLONAZEPAM 0.5 MG/1
0.25 TABLET ORAL 2 TIMES DAILY
Status: DISCONTINUED | OUTPATIENT
Start: 2024-04-05 | End: 2024-04-12 | Stop reason: HOSPADM

## 2024-04-05 RX ADMIN — NICOTINE POLACRILEX 4 MG: 4 GUM, CHEWING BUCCAL at 20:37

## 2024-04-05 RX ADMIN — LITHIUM CARBONATE 900 MG: 450 TABLET, EXTENDED RELEASE ORAL at 20:37

## 2024-04-05 RX ADMIN — TESTOSTERONE 50 MG OF TESTOSTERONE: 50 GEL TRANSDERMAL at 08:00

## 2024-04-05 RX ADMIN — CLINDAMYCIN PHOSPHATE: 10 LOTION TOPICAL at 09:54

## 2024-04-05 RX ADMIN — DOCUSATE SODIUM 100 MG: 50 CAPSULE, LIQUID FILLED ORAL at 20:37

## 2024-04-05 RX ADMIN — ROSUVASTATIN 40 MG: 20 TABLET, FILM COATED ORAL at 08:00

## 2024-04-05 RX ADMIN — DEXTROAMPHETAMINE SULFATE, DEXTROAMPHETAMINE SACCHARATE, AMPHETAMINE SULFATE AND AMPHETAMINE ASPARTATE 15 MG: 3.75; 3.75; 3.75; 3.75 CAPSULE, EXTENDED RELEASE ORAL at 08:01

## 2024-04-05 RX ADMIN — OLANZAPINE 10 MG: 10 TABLET, FILM COATED ORAL at 22:39

## 2024-04-05 RX ADMIN — BACITRACIN: 500 OINTMENT TOPICAL at 20:37

## 2024-04-05 RX ADMIN — CLONAZEPAM 0.25 MG: 0.5 TABLET ORAL at 11:26

## 2024-04-05 RX ADMIN — NICOTINE POLACRILEX 4 MG: 4 GUM, CHEWING BUCCAL at 08:00

## 2024-04-05 RX ADMIN — AMLODIPINE BESYLATE 10 MG: 10 TABLET ORAL at 08:01

## 2024-04-05 RX ADMIN — FLUPHENAZINE HYDROCHLORIDE 2 MG: 1 TABLET, FILM COATED ORAL at 08:01

## 2024-04-05 RX ADMIN — DEUTETRABENAZINE 6 MG: 6 TABLET, COATED ORAL at 08:03

## 2024-04-05 RX ADMIN — CLINDAMYCIN PHOSPHATE: 10 LOTION TOPICAL at 20:36

## 2024-04-05 RX ADMIN — QUETIAPINE FUMARATE 50 MG: 50 TABLET ORAL at 20:37

## 2024-04-05 RX ADMIN — PAROXETINE HYDROCHLORIDE 10 MG: 10 TABLET, FILM COATED ORAL at 08:01

## 2024-04-05 RX ADMIN — NICOTINE POLACRILEX 4 MG: 4 GUM, CHEWING BUCCAL at 18:55

## 2024-04-05 RX ADMIN — BACITRACIN: 500 OINTMENT TOPICAL at 08:04

## 2024-04-05 RX ADMIN — Medication 500 MG: at 08:01

## 2024-04-05 RX ADMIN — NICOTINE POLACRILEX 4 MG: 4 GUM, CHEWING BUCCAL at 11:14

## 2024-04-05 RX ADMIN — Medication 1 G: at 08:01

## 2024-04-05 RX ADMIN — EMPAGLIFLOZIN 10 MG: 10 TABLET, FILM COATED ORAL at 08:01

## 2024-04-05 RX ADMIN — DOXYCYCLINE HYCLATE 100 MG: 100 CAPSULE ORAL at 20:37

## 2024-04-05 RX ADMIN — CLONAZEPAM 0.25 MG: 0.5 TABLET ORAL at 19:22

## 2024-04-05 RX ADMIN — PROPRANOLOL HYDROCHLORIDE 10 MG: 10 TABLET ORAL at 08:00

## 2024-04-05 RX ADMIN — INSULIN GLARGINE 25 UNITS: 100 INJECTION, SOLUTION SUBCUTANEOUS at 08:03

## 2024-04-05 RX ADMIN — NICOTINE POLACRILEX 4 MG: 4 GUM, CHEWING BUCCAL at 22:39

## 2024-04-05 RX ADMIN — ARIPIPRAZOLE 5 MG: 5 TABLET ORAL at 08:01

## 2024-04-05 RX ADMIN — NICOTINE POLACRILEX 4 MG: 4 GUM, CHEWING BUCCAL at 17:43

## 2024-04-05 RX ADMIN — FLUPHENAZINE HYDROCHLORIDE 2 MG: 1 TABLET, FILM COATED ORAL at 20:36

## 2024-04-05 RX ADMIN — NICOTINE POLACRILEX 4 MG: 4 GUM, CHEWING BUCCAL at 09:53

## 2024-04-05 RX ADMIN — POLYETHYLENE GLYCOL 3350 17 G: 17 POWDER, FOR SOLUTION ORAL at 08:00

## 2024-04-05 RX ADMIN — DOXYCYCLINE HYCLATE 100 MG: 100 CAPSULE ORAL at 08:00

## 2024-04-05 RX ADMIN — QUETIAPINE FUMARATE 50 MG: 50 TABLET ORAL at 08:01

## 2024-04-05 RX ADMIN — NICOTINE 1 PATCH: 21 PATCH, EXTENDED RELEASE TRANSDERMAL at 08:00

## 2024-04-05 RX ADMIN — NICOTINE POLACRILEX 4 MG: 4 GUM, CHEWING BUCCAL at 13:27

## 2024-04-05 RX ADMIN — QUETIAPINE FUMARATE 200 MG: 200 TABLET ORAL at 20:37

## 2024-04-05 RX ADMIN — OLANZAPINE 10 MG: 10 TABLET, FILM COATED ORAL at 13:27

## 2024-04-05 RX ADMIN — ZINC SULFATE 220 MG (50 MG) CAPSULE 220 MG: CAPSULE at 08:01

## 2024-04-05 RX ADMIN — BENZOYL PEROXIDE: 50 LOTION TOPICAL at 09:53

## 2024-04-05 RX ADMIN — Medication 5 MG: at 20:37

## 2024-04-05 RX ADMIN — NICOTINE POLACRILEX 4 MG: 4 GUM, CHEWING BUCCAL at 12:43

## 2024-04-05 ASSESSMENT — ACTIVITIES OF DAILY LIVING (ADL)
ADLS_ACUITY_SCORE: 55
HYGIENE/GROOMING: INDEPENDENT
HYGIENE/GROOMING: INDEPENDENT
ADLS_ACUITY_SCORE: 55
DRESS: INDEPENDENT
ADLS_ACUITY_SCORE: 55
LAUNDRY: WITH SUPERVISION
ORAL_HYGIENE: INDEPENDENT
ADLS_ACUITY_SCORE: 55
ORAL_HYGIENE: INDEPENDENT
ADLS_ACUITY_SCORE: 55
DRESS: INDEPENDENT
LAUNDRY: WITH SUPERVISION
ADLS_ACUITY_SCORE: 55

## 2024-04-05 NOTE — PLAN OF CARE
BEH IP Unit Acuity Rating Score (UARS)  Patient is given one point for every criteria they meet.    CRITERIA SCORING   On a 72 hour hold, court hold, committed, stay of commitment, or revocation. 1/1    Patient LOS on BEH unit exceeds 20 days. 0/1  LOS: 9   Patient under guardianship, 55+, otherwise medically complex, or under age 11. 0/1   Suicide ideation without relief of precipitating factors. 1/1   Current plan for suicide. 0/1   Current plan for homicide. 0/1   Imminent risk or actual attempt to seriously harm another without relief of factors precipitating the attempt. 0/1   Severe dysfunction in daily living (ex: complete neglect for self care, extreme disruption in vegetative function, extreme deterioration in social interactions). 0/1   Recent (last 7 days) or current physical aggression in the ED or on unit. 0/1   Restraints or seclusion episode in past 72 hours. 0/1   Recent (last 7 days) or current verbal aggression, agitation, yelling, etc., while in the ED or unit. 0/1   Active psychosis. 1/1   Need for constant or near constant redirection (from leaving, from others, etc).  0/1   Intrusive or disruptive behaviors. 0/1   Patient requires 3 or more hours of individualized nursing care per 8-hour shift (i.e. for ADLs, meds, therapeutic interventions). 1/1   TOTAL 4

## 2024-04-05 NOTE — PLAN OF CARE
04/05/24 1328   Group Therapy Session   Group Attendance attended group session   Time Session Began 1015   Time Session Ended 1100   Total Time (minutes) 45   Total # Attendees 6   Group Type life skill;task skill;other (see comments)  (OT)   Group Topic Covered balanced lifestyle;coping skills/lifestyle management;leisure exploration/use of leisure time;structured socialization   Group Session Detail OT Clinic: Activity group for creative expression, promoting autonomy, building self worth, coping with stress and symptoms, and an opportunity to exercise cognitive skills (I.e. initiation, planning, organizing, sequencing, sustained attention, follow through, and overall self awareness).   Patient Response/Contribution able to recall/repeat info presented;cooperative with task;discussed personal experience with topic;expressed understanding of topic   Patient Participation Detail Pt initiated coming to group and when staff inquired about chosen activity for session, noted that he was going to just sit as he was feeling more anxious. Staff validated and also suggested to think about one of his activities he could also use for distraction. He did sit for a few minutes and then left and returned with a book and asked if he could read as his activity. Staff validated choice. Toward end of session, he then asked to engage in more coloring activity, noting finding some creative design that he liked and what he wanted to use completed project for. He then was social with another patient, engaged in the same activity and then offered to help staff with an organizing task and noted how he had done this on another unit when previously hospitalized. Affect was brighter at end of session and more engaged in conversation.

## 2024-04-05 NOTE — PLAN OF CARE
"Patient has spent majority of the shift in the milieu. Reports being anxious and depressed. Continues to have command hallucinations telling him to harm himself and others. Blood sugar was 71 at dinner, requested to have it checked before bedtime and it was 105. Affect is flat and guarded. Dressing on hand was changed and bacitracin applied. Med compliant. Continues to be on 24 hour SIO.     Patient evaluation continues. Assessed mood,anxiety,thoughts and behavior.     Patient gradually progressing towards goals.    Patient is encouraged to participate in groups and assisted to develop healthy coping skills.     VS reviewed: /75   Pulse 60   Temp 97.5  F (36.4  C) (Oral)   Resp 16   Ht 1.676 m (5' 6\")   Wt 99.8 kg (220 lb)   SpO2 96%   BMI 35.51 kg/m      Length of stay: 8    Refer to daily team meeting notes for individualized plan of care. Nursing will continue to assess.    "

## 2024-04-05 NOTE — PROVIDER NOTIFICATION
04/05/24 1455   Individualization/Patient Specific Goals   Patient Personal Strengths expressive of emotions;expressive of needs;family/social support;resilient   Patient Vulnerabilities adverse childhood experience(s);history of unsuccessful treatment;substance abuse/addiction;traumatic event   Interprofessional Rounds   Summary Pt endorsed command hallucinations to harm self or others. Pt has not acted on these and has contracted for safety on the unit. Pt presents as flat and blunted. Pt has been medication compliant. [facility] has submitted IRTS referrals. Barrier to discharge is pt's PMAP ended at the end of March. Financial counselors have been notified. [facility] has discussed DBT program with pt, as well.   Participants CTC;nursing;OT;psychiatrist   Behavioral Team Discussion   Participants Cortez Kruger MD; Tania Malik; [facility] Coleman [facility]; Samanta Gabriel OTR/L; Pharmacy; Nursing student   Progress Pt continues to endorse CAH to hurt self and others. Pt contracts for safety and does not plan to act on these.   Continued Stay Criteria/Rationale N/A   Medical/Physical No issues noted   Precautions Suicide, assault precautions   Plan Multidisciplinary team evaluation, medication management, care coordination   Rationale for change in precautions or plan N/A   Safety Plan Per unit protocol   Anticipated Discharge Disposition group home;[facility]     PRECAUTIONS AND SAFETY    Behavioral Orders   Procedures    Assault precautions    Code 1 - Restrict to Unit    Code 2     For imaging with .    Routine Programming     As clinically indicated    Status 15     Every 15 minutes.    Status Individual Observation     Patient SIO status reviewed with team/RN.  Please also refer to RN/team documentation for add'l detail.  SIO is for safety only, staff are not available for socialization and playing games or entertaining pt  -SIO staff to monitor following which have contributed to patient being on  SIO:  Homicidal ideation  -Possible interventions SIO staff could use to support patient's treatment progress:  Monitor patient, medication administration  -When following observed, team will review discontinuation of SIO:  When HI resolves     Order Specific Question:   CONTINUOUS 24 hours / day     Answer:   Other     Order Specific Question:   Specify distance     Answer:   10 ft     Order Specific Question:   Indications for SIO     Answer:   Assault risk    Suicide precautions: Suicide Risk: MODERATE     Patients on Suicide Precautions should have a Combination Diet ordered that includes a Diet selection(s) AND a Behavioral Tray selection for Safe Tray - with utensils, or Safe Tray - NO utensils       Order Specific Question:   Suicide Risk     Answer:   MODERATE       Safety  Safety WDL: .WDL except  Patient Location: lounge, patient room, own  Observed Behavior: sitting  Observed Behavior (Comment): watching TV, listening to music  Safety Measures: safety rounds completed, suicide check-in completed, 1:1 observation maintained, environmental rounds completed  Diversional Activity: journaling, television, music  Suicidality: Status 15, SIO (Status Individual Observation)  (NOTE - order will specify distance, Behavioral scrubs (pajamas), Minimal furniture in room, Minimal personal belongings in room, Free time and quiet time in lounge, Identify and strengthen protective factors, Promote patient engagement with treatment process  Assault: status 15, private room, minimal furniture in room, minimal personal belongings in room, behavioral scrubs (pajamas)

## 2024-04-05 NOTE — PROGRESS NOTES
[facility]    Medicine Progress Note - Hospitalist Service    Date of Admission:  3/25/2024    Assessment & Plan   Prakash Prasad is a 33 year old transgender male admitted on 3/25/2024. He has a past medical history of diabetes mellitus type 2, bipolar disorder type I, depression, PTSD, borderline personality disorder, ADHD, GERD, hypertension, hyperlipidemia, urinary retention, chronic low back pain, history of TBI, history of polysubstance use disorder, who presented for evaluation of hallucinations.  Medicine is asked to evaluate patient's hand.     # Superficial self-inflicted wounds to left hand  # Healed left 5th MCP fracture  Patient states that he had a fracture sustained to his hand in November 2023. He was placed in a cast at that time. Since then, there was always a degree of pain that never went away. Left hand with some minor differences upon palpation of joint spaces, compared to left. No erythema or swelling. He also has self-inflicted injuries from 3/31-4/1 that appear to be healing appropriately. Wounds are with intact skin and no swelling or streaking noted.   - left hand xray to eval given ongoing pain - negative for xray  - cont topical bacitracin  - recommend bandaid instead of gauze dressing to wounds    The patient's care was discussed with the Bedside Nurse and Patient. POC communicated to primary team via this note.     Yolie Rodriguez PA-C  Hospitalist Service  [facility]  Securely message with [employer] (more info)  Text page via [employer] Paging/Directory   ______________________________________________________________________    Interval History   Patient states interval improvement of his superficial wound, states proximal wound still feels a bit swollen. He states he has had ongoing pain to his left hand at the site of fracture sustained in November.     Physical Exam   Vital Signs: Temp: 97.5  F (36.4   C) Temp src: Oral BP: 111/75 Pulse: 60   Resp: 16 SpO2: 96 % O2 Device: None (Room air)    Weight: 220 lbs 0 oz    Focused physical exam:   Hands: left hand and distal wrist with two superficial wounds with mostly intact skin to both sites. No streaking, significant erythema, or notable swelling noted. Minor erythema present. 5/5 and equal strength to bilateral hands to flexion and extension of fingers, equal bilaterally. No notable joint or bony abnormalities, though left 4th and 5th MCP joints are slightly different compared to right.     Medical Decision Making       25 MINUTES SPENT BY ME on the date of service doing chart review, history, exam, documentation & further activities per the note.

## 2024-04-05 NOTE — PLAN OF CARE
Team Note Due:  Friday    Assessment/Intervention/Current Symtoms and Care Coordination:  Chart review, discussed pt progress, symptomology, and response to treatment.  Discussed the discharge plan and any potential impediments to discharge.    Tasks Completed:  - Received voicemail from pt's . Left follow-up message.  - Followed-up with Aminta at [facility] to schedule intake with [facility] outpatient program. Received out of office reply.  - Pt asked to speak with [facility] following a conversation with his . Pt reported he is hoping to have a meeting with [facility] and  to discuss discharge planning. [facility] shared that we could look into this next week. Pt asked about current discharge plan and if the team still wants pt to go to IRTS and work on getting insurance. [facility] shared this is one option and again reiterated the recommendation for DBT program, as well. Pt then reported he is leaning more toward the DBT program. [facility] shared that we will complete ROIs for DBT to submit referrals beginning next week and pt reported he understood and agreed with this plan.  - [facility] called pt's  who noted she was in a meeting and asked for phone call back at 3 PM. [facility] called at 3 PM and 3:30 PM and did not reach pt's . Will try early next week.    Discharge Plan or Goal:  Current plan is to stabilize symptoms and develop safe disposition plan. Following hospitalization, plan is for IRTS or group home with outpatient support (DBT program).     Barriers to Discharge:  Pt continues to endorse command hallucinations to harm others. Pt reports he will not act on these. Pt remains on 1:1 SIO due to HI. Further stabilization needed.     Referral Status:    IRTS Referrals:  [facility] - Submitted 4/3/2024  Contact: Kait Noonan   Direct dial: [phone]/fax [phone]  Direct email: [email or web address]  General  "Email: [email or web address]    [facility] - Submitted 4/3/2024  [facility]: [phone]  Fax: [phone]   4/4/2024: Declined due to \"not taking any additional insulin dependent clients at this time\"  [facility]: [phone]  Fax: [phone]     Legal Status:  Revoked PD   County: [place]  File Number: [id number]    Contacts:  Psychiatrist/Medication Management:  Name/Organization: Regine Last, CNP, APRN [facility]  Phone: [phone]  Fax: [phone]    Therapist:  Name/Organization: Joshua  [facility] - VAL signed  Phone: [phone]     :  Name/Organization: Cristy Ji  [facility] - [place] signed  Phone: [phone]    Group Home:  Name/Organization: [facility] Director  [facility] - VAL signed  Phone: [phone]    [facility]:  Name/Organization: [facility] - VAL signed  Phone: [phone]     CADI Worker:  Name/Organization: Pretty Guzman  [facility] - VAL signed  Phone: [phone]     Upcoming Meetings and Dates/Important Information and next steps:  - Follow-up with pt's .      Pt is a restricted recipient:  Per chart: Insurance Type: Medicaid  [facility]: [id number]     Pt is restricted Provider number [id number], [facility] is a provider for a Restricted Recipient for: Pharmacy    Provider number [id number], JENNIFER LEY is a provider for a Restricted Recipient for: Physician Services ,  Nurse Practitioner Services    Provider number [id number], [facility] is a provider for a Restricted Recipient for: Physician Services ,  Inpatient Hospital ,  Diagnostic Lab ,  Outpatient Hospital    Provider number [id number], [facility] is a provider for a Restricted   RRP Referral Needed within 90 days of Service     "

## 2024-04-05 NOTE — PROGRESS NOTES
"[facility], [place]   Psychiatric Progress Note        Interim History:     The patient's care was discussed with the treatment team during the daily team meeting and/or staff's chart notes were reviewed.  Staff report patient again slept for 7 hours last night. He remained with SIO and No Roommate order due to periodical Suicidal ideation and Homicidal thoughts, being unable to contract for safety. Compliant with meds and hospital routines, no Self injurious behavior was reported.      Met with patient: patient was seen in the conference room in presence of SIO staff. Presented with his typical blunted affect, limited eye contact. Said that mood was somewhat better and, though, he still had Auditory hallucinations, but they were better. Still told him to harm random people and at times, himself. He reported that anxiety was still pretty high, connected anxiety with thoughts of self harm. Asked to increase his dose of Klonopin (he currently taking 0.25 mg daily prn). Said that other meds don't seem to touch his anxiety, provide relief from it. We had pretty long conversation about addictive potential of benzodiazepines. Prakash said that his psychiatrist would continue to prescribe increased dose of Klonopin. We talked about plan to go to DBT vs IRTS. Prakash said that he was aware that by going to IRTS he could lose a bed at group home where he currently lives, then, said that he was aware that doing DBT would be a \"long term solution\" for his behavioral problems vs a temporary one with IRTS and was leaning more towards going home and doing DBT. In informed him that on Monday he will be seen by another provider. He thanked me and had no more questions or concerns.          Medications:     Current Facility-Administered Medications   Medication Dose Route Frequency Provider Last Rate Last Admin    amLODIPine (NORVASC) tablet 10 mg  10 mg Oral Daily Marco Dean MD   10 mg at " 04/05/24 0801    amphetamine-dextroamphetamine (ADDERALL XR) ER cap 15 mg  15 mg Oral Daily Cortez Kruger MD   15 mg at 04/05/24 0801    ARIPiprazole (ABILIFY) tablet 5 mg  5 mg Oral QAM Cortez Kruger MD   5 mg at 04/05/24 0801    bacitracin ointment   Topical BID Ashlyn Linton APRN CNP   Given at 04/05/24 0804    benzoyl peroxide 5 % lotion   Topical Daily Cortez rKuger MD   Given at 04/05/24 0953    clindamycin (CLEOCIN T) 1 % lotion   Topical BID Cortez Kruger MD   Given at 04/05/24 0954    clonazePAM (klonoPIN) half-tab 0.25 mg  0.25 mg Oral BID Cortez Kruger MD   0.25 mg at 04/05/24 1126    deutetrabenazine (AUSTEDO) tablet 6 mg  6 mg Oral Daily Helena Escobar APRN CNP   6 mg at 04/05/24 0803    doxycycline hyclate (VIBRAMYCIN) capsule 100 mg  100 mg Oral BID Marco Dean MD   100 mg at 04/05/24 0800    empagliflozin (JARDIANCE) tablet 10 mg  10 mg Oral Daily Marco Dean MD   10 mg at 04/05/24 0801    fish oil-omega-3 fatty acids capsule 1 g  1 g Oral Daily Cortez Kruger MD   1 g at 04/05/24 0801    fluPHENAZine (PROLIXIN) tablet 2 mg  2 mg Oral BID Cortez Kruger MD   2 mg at 04/05/24 0801    fluPHENAZine decanoate (PROLIXIN) injection 25 mg  25 mg Intramuscular Q14 Days Helena Escobar APRN CNP   25 mg at 03/27/24 1223    insulin glargine (LANTUS PEN) injection 25 Units  25 Units Subcutaneous QAM AC Marco Dean MD   25 Units at 04/05/24 0803    lithium (ESKALITH CR/LITHOBID) CR tablet 900 mg  900 mg Oral At Bedtime Marco Dean MD   900 mg at 04/04/24 1931    magnesium gluconate (MAGONATE) tablet 500 mg  500 mg Oral Daily Cortez Kruger MD   500 mg at 04/05/24 0801    nicotine (NICODERM CQ) 21 MG/24HR 24 hr patch 1 patch  1 patch Transdermal Q24H Cortez Kruger MD   1 patch at 04/05/24 0800    PARoxetine (PAXIL) tablet 10 mg  10 mg Oral Daily Cortez Kruger MD   10 mg at 04/05/24 0801     "polyethylene glycol (MIRALAX) Packet 17 g  17 g Oral Daily Cortez Kruger MD   17 g at 04/05/24 0800    QUEtiapine (SEROquel) tablet 200 mg  200 mg Oral At Bedtime Espinoza Rodriguez DO   200 mg at 04/04/24 1931    QUEtiapine (SEROquel) tablet 50 mg  50 mg Oral BID Cortez Kruger MD   50 mg at 04/05/24 0801    rosuvastatin (CRESTOR) tablet 40 mg  40 mg Oral Daily Marco Dean MD   40 mg at 04/05/24 0800    Semaglutide (RYBELSUS) tablet 7 mg  7 mg Oral Daily Valentino Sarah MD   7 mg at 04/05/24 0803    testosterone (ANDROGEL/TESTIM) 50 MG/5GM (1%) topical gel 50 mg of testosterone  50 mg of testosterone Transdermal Daily Brooklyn Negro APRN CNP   50 mg of testosterone at 04/05/24 0800    zinc sulfate (ZINCATE) capsule 220 mg  220 mg Oral Daily Cortez Kruger MD   220 mg at 04/05/24 0801          Allergies:     Allergies   Allergen Reactions    Haldol [Haloperidol] Other (See Comments)     Makes patient very angry and anxious    Adhesive Tape Hives    Percocet [Oxycodone-Acetaminophen] Nausea and Vomiting    Prednisone Other (See Comments) and Hives     Suicidal ideation    Risperidone Other (See Comments)    Tramadol Hcl Nausea and Vomiting    Droperidol Anxiety    Seroquel [Quetiapine] Palpitations     Spent 2 weeks in the hospital due to having seroquel, caused palpitations and QT prolongation          Labs:     Recent Results (from the past 24 hour(s))   Glucose by meter    Collection Time: 04/04/24  6:00 PM   Result Value Ref Range    GLUCOSE BY METER POCT 71 70 - 99 mg/dL   Glucose by meter    Collection Time: 04/04/24  9:08 PM   Result Value Ref Range    GLUCOSE BY METER POCT 105 (H) 70 - 99 mg/dL   Glucose by meter    Collection Time: 04/05/24  9:06 AM   Result Value Ref Range    GLUCOSE BY METER POCT 175 (H) 70 - 99 mg/dL          Psychiatric Examination:     /75   Pulse 70   Temp 97.6  F (36.4  C) (Temporal)   Resp 16   Ht 1.676 m (5' 6\")  "  Wt 99.8 kg (220 lb)   SpO2 97%   BMI 35.51 kg/m    Weight is 220 lbs 0 oz  Body mass index is 35.51 kg/m .    Orthostatic Vitals         Most Recent      Sitting Orthostatic /78 04/05 0813    Sitting Orthostatic Pulse (bpm) 70 04/05 0813    Standing Orthostatic /79 04/05 0813    Standing Orthostatic Pulse (bpm) 75 04/05 0813           Appearance: dressed in hospital scrubs and moderately obese  Attitude: more cooperative  Eye Contact: poor but slightly better  Mood:  still anxious, sad, less irritable  Affect:  blunted  Speech:  decreased prosody and paucity of speech  Psychomotor Behavior:  no evidence of tardive dyskinesia, dystonia, or tics  Throught Process:  linear and goal oriented  Associations:  no loose associations  Thought Content:  passive suicidal ideation present, auditory hallucinations present, and reports thoughts of harming self and others , less intense today  Insight:  limited  Judgement:  poor  Oriented to:  time, person, and place  Attention Span and Concentration:  limited  Recent and Remote Memory:  limited    Clinical Global Impressions  First: 7/4 4/1/2024      Most recent:            Precautions:     Behavioral Orders   Procedures    Assault precautions    Code 1 - Restrict to Unit    Code 2     For imaging with .    Routine Programming     As clinically indicated    Status 15     Every 15 minutes.    Status Individual Observation     Patient SIO status reviewed with team/RN.  Please also refer to RN/team documentation for add'l detail.  SIO is for safety only, staff are not available for socialization and playing games or entertaining pt  -SIO staff to monitor following which have contributed to patient being on SIO:  Homicidal ideation  -Possible interventions SIO staff could use to support patient's treatment progress:  Monitor patient, medication administration  -When following observed, team will review discontinuation of SIO:  When HI resolves     Order  Specific Question:   CONTINUOUS 24 hours / day     Answer:   Other     Order Specific Question:   Specify distance     Answer:   10 ft     Order Specific Question:   Indications for SIO     Answer:   Assault risk    Suicide precautions: Suicide Risk: MODERATE     Patients on Suicide Precautions should have a Combination Diet ordered that includes a Diet selection(s) AND a Behavioral Tray selection for Safe Tray - with utensils, or Safe Tray - NO utensils       Order Specific Question:   Suicide Risk     Answer:   MODERATE          DIagnoses:     Schizoaffective disorder bipolar type, current episode depressed, severe, with psychosis  Homicidal ideation  Borderline personality disorder  PTSD  ADHD, per history  Gender Dysphoria   Nicotine use disorder, severe, dependence  Cannabis use disorder, moderate, dependence  Opioid use disorder, moderate in early remission  Amphetamine use disorder, moderate   Ecstacy use disorder, 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 Some of it appears to be attention seeking, not of a genuine psychotic process. Will as per discussion above add scheduled dose of Klonopin as of 4/4/2024. Will continue to provide support and structure and continue on SIO and no roommate order. Patient now agrees to go to DBT after discharge, said that he was not focused on going to IRTS.    Total time spent was 36 minutes. Over 50% of times was spent counseling and coordination of care regarding coping skills, medication and discharge planning.

## 2024-04-05 NOTE — PLAN OF CARE
Problem: Adult Behavioral Health Plan of Care  Goal: Plan of Care Review  Recent Flowsheet Documentation  Taken 4/5/2024 1300 by Tania Malik RN  Patient Agreement with Plan of Care: agrees   Goal Outcome Evaluation:    Plan of Care Reviewed With: patient               Presentation: The patient is observed in the lounge for a majority of the shift, along with his SIO staff.  The patient is calm and cooperative.  Affect is flat and blunted.  Speech is clear and relevant.  Thoughts indicate poor concentration.       Mental health symptoms: Early in the shift, the patient denies SI, SIB, and HI.  The patient endorses auditory hallucinations, depression, and anxiety.  As the shift progressed, the patients anxiety and auditory hallucinations increased. The patient contracts for safety.  Requested a PRN Zyprexa 10 mg.     Medication compliance: The patient is compliant with all medications.      Medical Concerns: The patient has a wound/abrasion, self inflicted on the right hand.  Dressing change is completed.  There is a small redden area, no drainage, and healing appropriately.  The patient complains of constipation.  Bowel meds are given.     PRN's: Zyprexa 10 mg.       Precautions: suicide and assault.     Continue with plan of care

## 2024-04-06 LAB
GLUCOSE BLDC GLUCOMTR-MCNC: 122 MG/DL (ref 70–99)
GLUCOSE BLDC GLUCOMTR-MCNC: 92 MG/DL (ref 70–99)

## 2024-04-06 PROCEDURE — 250N000013 HC RX MED GY IP 250 OP 250 PS 637

## 2024-04-06 PROCEDURE — 250N000013 HC RX MED GY IP 250 OP 250 PS 637: Performed by: PSYCHIATRY & NEUROLOGY

## 2024-04-06 PROCEDURE — 250N000013 HC RX MED GY IP 250 OP 250 PS 637: Performed by: EMERGENCY MEDICINE

## 2024-04-06 PROCEDURE — 250N000013 HC RX MED GY IP 250 OP 250 PS 637: Performed by: NURSE PRACTITIONER

## 2024-04-06 PROCEDURE — 124N000002 HC R&B MH UMMC

## 2024-04-06 PROCEDURE — 250N000013 HC RX MED GY IP 250 OP 250 PS 637: Performed by: STUDENT IN AN ORGANIZED HEALTH CARE EDUCATION/TRAINING PROGRAM

## 2024-04-06 PROCEDURE — A9270 NON-COVERED ITEM OR SERVICE: HCPCS | Performed by: NURSE PRACTITIONER

## 2024-04-06 RX ORDER — FLUORIDE TOOTHPASTE
5-10 TOOTHPASTE DENTAL 4 TIMES DAILY PRN
Status: DISCONTINUED | OUTPATIENT
Start: 2024-04-06 | End: 2024-04-12 | Stop reason: HOSPADM

## 2024-04-06 RX ADMIN — NICOTINE 1 PATCH: 21 PATCH, EXTENDED RELEASE TRANSDERMAL at 08:39

## 2024-04-06 RX ADMIN — NICOTINE POLACRILEX 4 MG: 4 GUM, CHEWING BUCCAL at 18:25

## 2024-04-06 RX ADMIN — ROSUVASTATIN 40 MG: 20 TABLET, FILM COATED ORAL at 08:34

## 2024-04-06 RX ADMIN — QUETIAPINE FUMARATE 50 MG: 50 TABLET ORAL at 08:35

## 2024-04-06 RX ADMIN — CLONAZEPAM 0.25 MG: 0.5 TABLET ORAL at 11:56

## 2024-04-06 RX ADMIN — TRAZODONE HYDROCHLORIDE 50 MG: 50 TABLET ORAL at 21:56

## 2024-04-06 RX ADMIN — NICOTINE POLACRILEX 4 MG: 4 GUM, CHEWING BUCCAL at 19:30

## 2024-04-06 RX ADMIN — FLUPHENAZINE HYDROCHLORIDE 2 MG: 1 TABLET, FILM COATED ORAL at 19:30

## 2024-04-06 RX ADMIN — DOXYCYCLINE HYCLATE 100 MG: 100 CAPSULE ORAL at 19:30

## 2024-04-06 RX ADMIN — DOCUSATE SODIUM 100 MG: 50 CAPSULE, LIQUID FILLED ORAL at 19:31

## 2024-04-06 RX ADMIN — Medication 500 MG: at 08:34

## 2024-04-06 RX ADMIN — EMPAGLIFLOZIN 10 MG: 10 TABLET, FILM COATED ORAL at 08:39

## 2024-04-06 RX ADMIN — Medication 5 MG: at 19:30

## 2024-04-06 RX ADMIN — NICOTINE POLACRILEX 4 MG: 4 GUM, CHEWING BUCCAL at 13:32

## 2024-04-06 RX ADMIN — ARIPIPRAZOLE 5 MG: 5 TABLET ORAL at 08:36

## 2024-04-06 RX ADMIN — CLINDAMYCIN PHOSPHATE: 10 LOTION TOPICAL at 19:32

## 2024-04-06 RX ADMIN — BENZOYL PEROXIDE: 50 LOTION TOPICAL at 09:58

## 2024-04-06 RX ADMIN — NICOTINE POLACRILEX 4 MG: 4 GUM, CHEWING BUCCAL at 17:32

## 2024-04-06 RX ADMIN — CLONAZEPAM 0.25 MG: 0.5 TABLET ORAL at 08:33

## 2024-04-06 RX ADMIN — NICOTINE POLACRILEX 4 MG: 4 GUM, CHEWING BUCCAL at 09:58

## 2024-04-06 RX ADMIN — PAROXETINE HYDROCHLORIDE 10 MG: 10 TABLET, FILM COATED ORAL at 08:35

## 2024-04-06 RX ADMIN — INSULIN GLARGINE 25 UNITS: 100 INJECTION, SOLUTION SUBCUTANEOUS at 08:33

## 2024-04-06 RX ADMIN — FLUPHENAZINE HYDROCHLORIDE 2 MG: 1 TABLET, FILM COATED ORAL at 08:33

## 2024-04-06 RX ADMIN — DEUTETRABENAZINE 6 MG: 6 TABLET, COATED ORAL at 08:38

## 2024-04-06 RX ADMIN — NICOTINE POLACRILEX 4 MG: 4 GUM, CHEWING BUCCAL at 21:56

## 2024-04-06 RX ADMIN — Medication 1 G: at 08:35

## 2024-04-06 RX ADMIN — LITHIUM CARBONATE 900 MG: 450 TABLET, EXTENDED RELEASE ORAL at 19:30

## 2024-04-06 RX ADMIN — CLONAZEPAM 0.25 MG: 0.5 TABLET ORAL at 19:07

## 2024-04-06 RX ADMIN — DEXTROAMPHETAMINE SULFATE, DEXTROAMPHETAMINE SACCHARATE, AMPHETAMINE SULFATE AND AMPHETAMINE ASPARTATE 15 MG: 3.75; 3.75; 3.75; 3.75 CAPSULE, EXTENDED RELEASE ORAL at 08:35

## 2024-04-06 RX ADMIN — AMLODIPINE BESYLATE 10 MG: 10 TABLET ORAL at 08:35

## 2024-04-06 RX ADMIN — NICOTINE POLACRILEX 4 MG: 4 GUM, CHEWING BUCCAL at 08:59

## 2024-04-06 RX ADMIN — TESTOSTERONE 50 MG OF TESTOSTERONE: 50 GEL TRANSDERMAL at 08:35

## 2024-04-06 RX ADMIN — DOXYCYCLINE HYCLATE 100 MG: 100 CAPSULE ORAL at 08:36

## 2024-04-06 RX ADMIN — QUETIAPINE FUMARATE 50 MG: 50 TABLET ORAL at 19:30

## 2024-04-06 RX ADMIN — QUETIAPINE FUMARATE 200 MG: 200 TABLET ORAL at 19:30

## 2024-04-06 RX ADMIN — NICOTINE POLACRILEX 4 MG: 4 GUM, CHEWING BUCCAL at 11:56

## 2024-04-06 RX ADMIN — ZINC SULFATE 220 MG (50 MG) CAPSULE 220 MG: CAPSULE at 08:35

## 2024-04-06 RX ADMIN — CLINDAMYCIN PHOSPHATE: 10 LOTION TOPICAL at 09:59

## 2024-04-06 RX ADMIN — NICOTINE POLACRILEX 4 MG: 4 GUM, CHEWING BUCCAL at 14:43

## 2024-04-06 RX ADMIN — POLYETHYLENE GLYCOL 3350 17 G: 17 POWDER, FOR SOLUTION ORAL at 08:39

## 2024-04-06 ASSESSMENT — ACTIVITIES OF DAILY LIVING (ADL)
ORAL_HYGIENE: INDEPENDENT
ADLS_ACUITY_SCORE: 55
HYGIENE/GROOMING: INDEPENDENT
ADLS_ACUITY_SCORE: 55
DRESS: INDEPENDENT
ADLS_ACUITY_SCORE: 55
LAUNDRY: WITH SUPERVISION
ADLS_ACUITY_SCORE: 55
LAUNDRY: WITH SUPERVISION
ADLS_ACUITY_SCORE: 55
HYGIENE/GROOMING: INDEPENDENT
DRESS: INDEPENDENT
ORAL_HYGIENE: INDEPENDENT

## 2024-04-06 NOTE — PLAN OF CARE
"Patient has spent majority of the shift in the milieu. Attended the evening group. Continues to hear voices that are telling them to hurt themselves and others. Reports being anxious and depressed. Requested prn klonopin for anxiety. Stated later in the evening that his mood had improved and felt like things were turning around. Ate dinner and snack. Had a visit from his friend which seemed to go well. Med compliant. Affect is flat. Continues to be on a 24 hour SIO. Blood sugar was 78.     Patient evaluation continues. Assessed mood,anxiety,thoughts and behavior.     Patient gradually progressing towards goals.    Patient is encouraged to participate in groups and assisted to develop healthy coping skills.     VS reviewed: /79   Pulse 91   Temp 98.1  F (36.7  C) (Oral)   Resp 16   Ht 1.676 m (5' 6\")   Wt 99.8 kg (220 lb)   SpO2 96%   BMI 35.51 kg/m      Length of stay: 9    Refer to daily team meeting notes for individualized plan of care. Nursing will continue to assess.    "

## 2024-04-06 NOTE — PLAN OF CARE
Pt appeared to sleep for a total of 7 hours overnight. No PRN medications were administered, and no concerns were noted. Pt remains on 1:1 SIO (10ft) d/t assault risk r/t pt's report of ongoing command auditory hallucinations to harm self and others.      Problem: Sleep Disturbance  Goal: Adequate Sleep/Rest  Outcome: Progressing      Initial (On Arrival)

## 2024-04-06 NOTE — PLAN OF CARE
Problem: Adult Inpatient Plan of Care  Goal: Optimal Comfort and Wellbeing  Intervention: Provide Person-Centered Care  Recent Flowsheet Documentation  Taken 4/6/2024 1200 by Tania Malik RN  Trust Relationship/Rapport:   care explained   choices provided   questions encouraged   thoughts/feelings acknowledged   Goal Outcome Evaluation:    Plan of Care Reviewed With: patient          Presentation: The patient continues on a SIO for SI and HI.  Upon waking, the patient reports feeling in a good mood and denies SI and SI.  As the day progresses, the patient endorses thoughts.  The patient reports being bored.  The patient uses distractions, prns, learned coping skills.  To keep from self harm.     Medication compliance: The patient is compliant with all medications.     Medical Concerns: The patient denies any medical concerns.  The patient did not provide a clear answer if he continues to have constipation.  The patient refused bowel meds when offered.      PRN's: Nicotine gum      Precautions: Suicidal, SIB, and Homicidal.     Continue with plan of care

## 2024-04-06 NOTE — PLAN OF CARE
"   04/05/24 4929   Group Therapy Session   Group Attendance attended group session   Time Session Began 1800   Time Session Ended 1845   Total Time (minutes) 45   Total # Attendees 4   Group Type psychotherapeutic   Group Topic Covered structured socialization;self-care activities;balanced lifestyle   Group Session Detail Boundaries and Self care - importance for mental health   Patient Response/Contribution cooperative with task;discussed personal experience with topic;expressed readiness to alter behaviors;listened actively   Patient Participation Detail mood optimisitc, feels like it was a good meeting with doctor today and they feel \"like I have turned a corner\", some anxiety, but better.     Goal Outcome Evaluation:                        "

## 2024-04-07 LAB
GLUCOSE BLDC GLUCOMTR-MCNC: 115 MG/DL (ref 70–99)
GLUCOSE BLDC GLUCOMTR-MCNC: 125 MG/DL (ref 70–99)

## 2024-04-07 PROCEDURE — 250N000013 HC RX MED GY IP 250 OP 250 PS 637: Performed by: NURSE PRACTITIONER

## 2024-04-07 PROCEDURE — 250N000013 HC RX MED GY IP 250 OP 250 PS 637: Performed by: PSYCHIATRY & NEUROLOGY

## 2024-04-07 PROCEDURE — 250N000013 HC RX MED GY IP 250 OP 250 PS 637: Performed by: STUDENT IN AN ORGANIZED HEALTH CARE EDUCATION/TRAINING PROGRAM

## 2024-04-07 PROCEDURE — 250N000013 HC RX MED GY IP 250 OP 250 PS 637

## 2024-04-07 PROCEDURE — 250N000013 HC RX MED GY IP 250 OP 250 PS 637: Performed by: REGISTERED NURSE

## 2024-04-07 PROCEDURE — A9270 NON-COVERED ITEM OR SERVICE: HCPCS | Performed by: NURSE PRACTITIONER

## 2024-04-07 PROCEDURE — 124N000002 HC R&B MH UMMC

## 2024-04-07 PROCEDURE — 250N000013 HC RX MED GY IP 250 OP 250 PS 637: Performed by: EMERGENCY MEDICINE

## 2024-04-07 RX ADMIN — OLANZAPINE 10 MG: 10 TABLET, FILM COATED ORAL at 16:09

## 2024-04-07 RX ADMIN — INSULIN GLARGINE 25 UNITS: 100 INJECTION, SOLUTION SUBCUTANEOUS at 08:19

## 2024-04-07 RX ADMIN — DOCUSATE SODIUM 100 MG: 50 CAPSULE, LIQUID FILLED ORAL at 19:56

## 2024-04-07 RX ADMIN — NICOTINE POLACRILEX 4 MG: 4 GUM, CHEWING BUCCAL at 12:07

## 2024-04-07 RX ADMIN — BACITRACIN: 500 OINTMENT TOPICAL at 08:19

## 2024-04-07 RX ADMIN — DOCUSATE SODIUM 100 MG: 50 CAPSULE, LIQUID FILLED ORAL at 08:34

## 2024-04-07 RX ADMIN — Medication 10 ML: at 16:28

## 2024-04-07 RX ADMIN — TRAZODONE HYDROCHLORIDE 50 MG: 50 TABLET ORAL at 19:56

## 2024-04-07 RX ADMIN — CLONAZEPAM 0.25 MG: 0.5 TABLET ORAL at 08:17

## 2024-04-07 RX ADMIN — DEUTETRABENAZINE 6 MG: 6 TABLET, COATED ORAL at 08:19

## 2024-04-07 RX ADMIN — ARIPIPRAZOLE 5 MG: 5 TABLET ORAL at 08:17

## 2024-04-07 RX ADMIN — AMLODIPINE BESYLATE 10 MG: 10 TABLET ORAL at 08:17

## 2024-04-07 RX ADMIN — DEXTROAMPHETAMINE SULFATE, DEXTROAMPHETAMINE SACCHARATE, AMPHETAMINE SULFATE AND AMPHETAMINE ASPARTATE 15 MG: 3.75; 3.75; 3.75; 3.75 CAPSULE, EXTENDED RELEASE ORAL at 08:17

## 2024-04-07 RX ADMIN — NICOTINE POLACRILEX 4 MG: 4 GUM, CHEWING BUCCAL at 19:54

## 2024-04-07 RX ADMIN — LITHIUM CARBONATE 900 MG: 450 TABLET, EXTENDED RELEASE ORAL at 19:54

## 2024-04-07 RX ADMIN — CLINDAMYCIN PHOSPHATE: 10 LOTION TOPICAL at 19:53

## 2024-04-07 RX ADMIN — CLONAZEPAM 0.25 MG: 0.5 TABLET ORAL at 12:07

## 2024-04-07 RX ADMIN — QUETIAPINE FUMARATE 50 MG: 50 TABLET ORAL at 08:17

## 2024-04-07 RX ADMIN — QUETIAPINE FUMARATE 50 MG: 50 TABLET ORAL at 19:54

## 2024-04-07 RX ADMIN — NICOTINE POLACRILEX 4 MG: 4 GUM, CHEWING BUCCAL at 14:58

## 2024-04-07 RX ADMIN — Medication 1 G: at 08:17

## 2024-04-07 RX ADMIN — QUETIAPINE FUMARATE 200 MG: 200 TABLET ORAL at 19:54

## 2024-04-07 RX ADMIN — PAROXETINE HYDROCHLORIDE 10 MG: 10 TABLET, FILM COATED ORAL at 08:17

## 2024-04-07 RX ADMIN — FLUPHENAZINE HYDROCHLORIDE 5 MG: 5 TABLET, FILM COATED ORAL at 18:05

## 2024-04-07 RX ADMIN — TESTOSTERONE 50 MG OF TESTOSTERONE: 50 GEL TRANSDERMAL at 08:17

## 2024-04-07 RX ADMIN — NICOTINE POLACRILEX 4 MG: 4 GUM, CHEWING BUCCAL at 18:06

## 2024-04-07 RX ADMIN — Medication 5 MG: at 19:56

## 2024-04-07 RX ADMIN — POLYETHYLENE GLYCOL 3350 17 G: 17 POWDER, FOR SOLUTION ORAL at 08:16

## 2024-04-07 RX ADMIN — DOXYCYCLINE HYCLATE 100 MG: 100 CAPSULE ORAL at 08:17

## 2024-04-07 RX ADMIN — FLUPHENAZINE HYDROCHLORIDE 2 MG: 1 TABLET, FILM COATED ORAL at 19:54

## 2024-04-07 RX ADMIN — DOXYCYCLINE HYCLATE 100 MG: 100 CAPSULE ORAL at 19:54

## 2024-04-07 RX ADMIN — Medication 500 MG: at 08:16

## 2024-04-07 RX ADMIN — ROSUVASTATIN 40 MG: 20 TABLET, FILM COATED ORAL at 08:16

## 2024-04-07 RX ADMIN — NICOTINE POLACRILEX 4 MG: 4 GUM, CHEWING BUCCAL at 08:34

## 2024-04-07 RX ADMIN — EMPAGLIFLOZIN 10 MG: 10 TABLET, FILM COATED ORAL at 08:17

## 2024-04-07 RX ADMIN — ZINC SULFATE 220 MG (50 MG) CAPSULE 220 MG: CAPSULE at 08:17

## 2024-04-07 RX ADMIN — BACITRACIN: 500 OINTMENT TOPICAL at 21:31

## 2024-04-07 RX ADMIN — NICOTINE 1 PATCH: 21 PATCH, EXTENDED RELEASE TRANSDERMAL at 08:20

## 2024-04-07 RX ADMIN — CLONAZEPAM 0.25 MG: 0.5 TABLET ORAL at 16:08

## 2024-04-07 RX ADMIN — NICOTINE POLACRILEX 4 MG: 4 GUM, CHEWING BUCCAL at 16:28

## 2024-04-07 RX ADMIN — HYDROXYZINE HYDROCHLORIDE 25 MG: 25 TABLET, FILM COATED ORAL at 18:06

## 2024-04-07 RX ADMIN — FLUPHENAZINE HYDROCHLORIDE 2 MG: 1 TABLET, FILM COATED ORAL at 08:16

## 2024-04-07 RX ADMIN — CLINDAMYCIN PHOSPHATE: 10 LOTION TOPICAL at 08:18

## 2024-04-07 RX ADMIN — NICOTINE POLACRILEX 4 MG: 4 GUM, CHEWING BUCCAL at 06:34

## 2024-04-07 RX ADMIN — BENZOYL PEROXIDE: 50 LOTION TOPICAL at 08:19

## 2024-04-07 RX ADMIN — NICOTINE POLACRILEX 4 MG: 4 GUM, CHEWING BUCCAL at 10:05

## 2024-04-07 ASSESSMENT — ACTIVITIES OF DAILY LIVING (ADL)
ADLS_ACUITY_SCORE: 55
ORAL_HYGIENE: INDEPENDENT
ADLS_ACUITY_SCORE: 55
ADLS_ACUITY_SCORE: 55
LAUNDRY: WITH SUPERVISION
ADLS_ACUITY_SCORE: 55
ORAL_HYGIENE: INDEPENDENT
DRESS: INDEPENDENT
ADLS_ACUITY_SCORE: 55
DRESS: INDEPENDENT
HYGIENE/GROOMING: INDEPENDENT
ADLS_ACUITY_SCORE: 55
HYGIENE/GROOMING: INDEPENDENT
ADLS_ACUITY_SCORE: 55
LAUNDRY: WITH SUPERVISION
ADLS_ACUITY_SCORE: 55

## 2024-04-07 NOTE — PLAN OF CARE
"Patient has spent the early part of the shift in the milieu. Did not attend the evening group. Ate dinner. Reported being very anxious, requested prn klonopin. Reports feeling depressed. Continues to hear voices to hurt themself and others. Affect is flat and tense. Med compliant. Continues to be on a 24 hour SIO. Cooperative on the unit.      Patient evaluation continues. Assessed mood,anxiety,thoughts and behavior.     Patient gradually progressing towards goals.    Patient is encouraged to participate in groups and assisted to develop healthy coping skills.     VS reviewed: BP (!) 143/85   Pulse 111   Temp 98.3  F (36.8  C) (Oral)   Resp 16   Ht 1.676 m (5' 6\")   Wt 99.8 kg (220 lb)   SpO2 96%   BMI 35.51 kg/m      Length of stay: 10    Refer to daily team meeting notes for individualized plan of care. Nursing will continue to assess.      "

## 2024-04-07 NOTE — PLAN OF CARE
"Patient has spent majority of the shift in the milieu. Denies any thoughts of hurting others. Continues to have suicidal ideation and self injurious thoughts. States that the voices are less. States that anxiety and depression are less today.  Continues to be on a 24 SIO. Ate breakfast and lunch. Med compliant. Cooperative on the unit. Affect appeared more upbeat.    Patient evaluation continues. Assessed mood,anxiety,thoughts and behavior.     Patient gradually progressing towards goals.    Patient is encouraged to participate in groups and assisted to develop healthy coping skills.     VS reviewed: BP (!) 143/85   Pulse 111   Temp 97.7  F (36.5  C) (Oral)   Resp 16   Ht 1.676 m (5' 6\")   Wt 102.8 kg (226 lb 9.6 oz)   SpO2 96%   BMI 36.57 kg/m      Length of stay: 11    Refer to daily team meeting notes for individualized plan of care. Nursing will continue to assess.    "

## 2024-04-07 NOTE — PLAN OF CARE
Pt appeared to sleep for a total of 6.5 hours overnight. No PRN medications were administered, and no concerns were noted. 1:1 SIO (10ft) remains in place d/t assault risk, as pt  continues to endorse ongoing HI/command AH to harm self and others.     Problem: Sleep Disturbance  Goal: Adequate Sleep/Rest  Outcome: Progressing

## 2024-04-08 LAB
GLUCOSE BLDC GLUCOMTR-MCNC: 119 MG/DL (ref 70–99)
GLUCOSE BLDC GLUCOMTR-MCNC: 140 MG/DL (ref 70–99)

## 2024-04-08 PROCEDURE — 250N000013 HC RX MED GY IP 250 OP 250 PS 637: Performed by: PSYCHIATRY & NEUROLOGY

## 2024-04-08 PROCEDURE — 250N000013 HC RX MED GY IP 250 OP 250 PS 637: Performed by: STUDENT IN AN ORGANIZED HEALTH CARE EDUCATION/TRAINING PROGRAM

## 2024-04-08 PROCEDURE — 90853 GROUP PSYCHOTHERAPY: CPT

## 2024-04-08 PROCEDURE — 250N000013 HC RX MED GY IP 250 OP 250 PS 637: Performed by: EMERGENCY MEDICINE

## 2024-04-08 PROCEDURE — 250N000013 HC RX MED GY IP 250 OP 250 PS 637

## 2024-04-08 PROCEDURE — 250N000013 HC RX MED GY IP 250 OP 250 PS 637: Performed by: ANESTHESIOLOGY

## 2024-04-08 PROCEDURE — A9270 NON-COVERED ITEM OR SERVICE: HCPCS | Performed by: NURSE PRACTITIONER

## 2024-04-08 PROCEDURE — 99232 SBSQ HOSP IP/OBS MODERATE 35: CPT | Mod: 95 | Performed by: NURSE PRACTITIONER

## 2024-04-08 PROCEDURE — 250N000011 HC RX IP 250 OP 636: Performed by: PSYCHIATRY & NEUROLOGY

## 2024-04-08 PROCEDURE — 124N000002 HC R&B MH UMMC

## 2024-04-08 PROCEDURE — 250N000012 HC RX MED GY IP 250 OP 636 PS 637: Performed by: PSYCHIATRY & NEUROLOGY

## 2024-04-08 PROCEDURE — 250N000013 HC RX MED GY IP 250 OP 250 PS 637: Performed by: NURSE PRACTITIONER

## 2024-04-08 RX ADMIN — ROSUVASTATIN 40 MG: 20 TABLET, FILM COATED ORAL at 08:41

## 2024-04-08 RX ADMIN — Medication 500 MG: at 08:43

## 2024-04-08 RX ADMIN — NICOTINE POLACRILEX 4 MG: 4 GUM, CHEWING BUCCAL at 11:13

## 2024-04-08 RX ADMIN — EMPAGLIFLOZIN 10 MG: 10 TABLET, FILM COATED ORAL at 08:43

## 2024-04-08 RX ADMIN — QUETIAPINE FUMARATE 50 MG: 50 TABLET ORAL at 08:43

## 2024-04-08 RX ADMIN — FLUPHENAZINE HYDROCHLORIDE 2 MG: 1 TABLET, FILM COATED ORAL at 19:23

## 2024-04-08 RX ADMIN — ONDANSETRON 4 MG: 4 TABLET, FILM COATED ORAL at 09:48

## 2024-04-08 RX ADMIN — AMLODIPINE BESYLATE 10 MG: 10 TABLET ORAL at 08:41

## 2024-04-08 RX ADMIN — INSULIN GLARGINE 25 UNITS: 100 INJECTION, SOLUTION SUBCUTANEOUS at 08:44

## 2024-04-08 RX ADMIN — NICOTINE POLACRILEX 4 MG: 4 GUM, CHEWING BUCCAL at 19:25

## 2024-04-08 RX ADMIN — NICOTINE POLACRILEX 4 MG: 4 GUM, CHEWING BUCCAL at 17:43

## 2024-04-08 RX ADMIN — NICOTINE POLACRILEX 4 MG: 4 GUM, CHEWING BUCCAL at 16:42

## 2024-04-08 RX ADMIN — DOXYCYCLINE HYCLATE 100 MG: 100 CAPSULE ORAL at 08:41

## 2024-04-08 RX ADMIN — FLUPHENAZINE HYDROCHLORIDE 2 MG: 1 TABLET, FILM COATED ORAL at 08:41

## 2024-04-08 RX ADMIN — Medication 25 MG: at 18:18

## 2024-04-08 RX ADMIN — DOCUSATE SODIUM 100 MG: 50 CAPSULE, LIQUID FILLED ORAL at 09:48

## 2024-04-08 RX ADMIN — DOCUSATE SODIUM 100 MG: 50 CAPSULE, LIQUID FILLED ORAL at 19:22

## 2024-04-08 RX ADMIN — ACETAMINOPHEN 650 MG: 325 TABLET, FILM COATED ORAL at 07:17

## 2024-04-08 RX ADMIN — NICOTINE POLACRILEX 4 MG: 4 GUM, CHEWING BUCCAL at 13:44

## 2024-04-08 RX ADMIN — TESTOSTERONE 50 MG OF TESTOSTERONE: 50 GEL TRANSDERMAL at 14:55

## 2024-04-08 RX ADMIN — NICOTINE POLACRILEX 4 MG: 4 GUM, CHEWING BUCCAL at 14:56

## 2024-04-08 RX ADMIN — NICOTINE POLACRILEX 4 MG: 4 GUM, CHEWING BUCCAL at 12:41

## 2024-04-08 RX ADMIN — POLYETHYLENE GLYCOL 3350 17 G: 17 POWDER, FOR SOLUTION ORAL at 08:45

## 2024-04-08 RX ADMIN — CLONAZEPAM 0.25 MG: 0.5 TABLET ORAL at 13:35

## 2024-04-08 RX ADMIN — CLINDAMYCIN PHOSPHATE: 10 LOTION TOPICAL at 19:25

## 2024-04-08 RX ADMIN — ACETAMINOPHEN 650 MG: 325 TABLET, FILM COATED ORAL at 17:43

## 2024-04-08 RX ADMIN — LITHIUM CARBONATE 900 MG: 450 TABLET, EXTENDED RELEASE ORAL at 19:22

## 2024-04-08 RX ADMIN — DEXTROAMPHETAMINE SULFATE, DEXTROAMPHETAMINE SACCHARATE, AMPHETAMINE SULFATE AND AMPHETAMINE ASPARTATE 15 MG: 3.75; 3.75; 3.75; 3.75 CAPSULE, EXTENDED RELEASE ORAL at 08:42

## 2024-04-08 RX ADMIN — DOXYCYCLINE HYCLATE 100 MG: 100 CAPSULE ORAL at 19:23

## 2024-04-08 RX ADMIN — DEUTETRABENAZINE 6 MG: 6 TABLET, COATED ORAL at 08:40

## 2024-04-08 RX ADMIN — Medication 5 MG: at 19:22

## 2024-04-08 RX ADMIN — ZINC SULFATE 220 MG (50 MG) CAPSULE 220 MG: CAPSULE at 08:42

## 2024-04-08 RX ADMIN — Medication 1 G: at 08:43

## 2024-04-08 RX ADMIN — BACITRACIN: 500 OINTMENT TOPICAL at 19:25

## 2024-04-08 RX ADMIN — NICOTINE 1 PATCH: 21 PATCH, EXTENDED RELEASE TRANSDERMAL at 08:45

## 2024-04-08 RX ADMIN — ARIPIPRAZOLE 5 MG: 5 TABLET ORAL at 08:42

## 2024-04-08 RX ADMIN — PAROXETINE HYDROCHLORIDE 10 MG: 10 TABLET, FILM COATED ORAL at 08:43

## 2024-04-08 RX ADMIN — NICOTINE POLACRILEX 4 MG: 4 GUM, CHEWING BUCCAL at 06:33

## 2024-04-08 RX ADMIN — CLONAZEPAM 0.25 MG: 0.5 TABLET ORAL at 08:42

## 2024-04-08 RX ADMIN — CLONAZEPAM 0.25 MG: 0.5 TABLET ORAL at 16:41

## 2024-04-08 RX ADMIN — QUETIAPINE FUMARATE 200 MG: 200 TABLET ORAL at 19:23

## 2024-04-08 RX ADMIN — BACITRACIN: 500 OINTMENT TOPICAL at 09:03

## 2024-04-08 RX ADMIN — NICOTINE POLACRILEX 4 MG: 4 GUM, CHEWING BUCCAL at 09:03

## 2024-04-08 RX ADMIN — QUETIAPINE FUMARATE 50 MG: 50 TABLET ORAL at 19:23

## 2024-04-08 ASSESSMENT — ACTIVITIES OF DAILY LIVING (ADL)
ADLS_ACUITY_SCORE: 55
LAUNDRY: WITH SUPERVISION
ADLS_ACUITY_SCORE: 55
DRESS: INDEPENDENT
HYGIENE/GROOMING: INDEPENDENT
ADLS_ACUITY_SCORE: 55
ORAL_HYGIENE: INDEPENDENT
ADLS_ACUITY_SCORE: 55
ADLS_ACUITY_SCORE: 55

## 2024-04-08 NOTE — PLAN OF CARE
Pt appeared to sleep for a total of 6.75 hours overnight. No PRN medications were administered, and no concerns were noted. Pt remains on 1:1 SIO for ongoing safety concerns r/t self-reported HI/command AH to harm self and others.     Problem: Sleep Disturbance  Goal: Adequate Sleep/Rest  Outcome: Progressing

## 2024-04-08 NOTE — PLAN OF CARE
"Patient has spent majority of the shift in the milieu. Has been quite restless, irritable and tense. Patient requested prn zyprexa and klonopin after saying the voices and urges were too strong and began to scratch on his hand. Reported being anxious and depressed. Patient was encouraged to go to group which he did, and given an ice pack and aromatherapy. Asked staff to print out a sheet on coping skills. Later approached writer saying that they needed more prn's. Requested hydroxyzine and prolixin. Ate dinner and snack. Med compliant.    Patient evaluation continues. Assessed mood,anxiety,thoughts and behavior.     Patient gradually progressing towards goals.    Patient is encouraged to participate in groups and assisted to develop healthy coping skills.     VS reviewed: BP (!) 132/90   Pulse 94   Temp 98.1  F (36.7  C) (Temporal)   Resp 16   Ht 1.676 m (5' 6\")   Wt 102.8 kg (226 lb 9.6 oz)   SpO2 97%   BMI 36.57 kg/m      Length of stay: 11    Refer to daily team meeting notes for individualized plan of care. Nursing will continue to assess.      "

## 2024-04-08 NOTE — PLAN OF CARE
BEH IP Unit Acuity Rating Score (UARS)  Patient is given one point for every criteria they meet.    CRITERIA SCORING   On a 72 hour hold, court hold, committed, stay of commitment, or revocation. 1/1    Patient LOS on BEH unit exceeds 20 days. 0/1  LOS: 12   Patient under guardianship, 55+, otherwise medically complex, or under age 11. 0/1   Suicide ideation without relief of precipitating factors. 1/1   Current plan for suicide. 0/1   Current plan for homicide. 0/1   Imminent risk or actual attempt to seriously harm another without relief of factors precipitating the attempt. 0/1   Severe dysfunction in daily living (ex: complete neglect for self care, extreme disruption in vegetative function, extreme deterioration in social interactions). 0/1   Recent (last 7 days) or current physical aggression in the ED or on unit. 0/1   Restraints or seclusion episode in past 72 hours. 0/1   Recent (last 7 days) or current verbal aggression, agitation, yelling, etc., while in the ED or unit. 0/1   Active psychosis. 1/1   Need for constant or near constant redirection (from leaving, from others, etc).  0/1   Intrusive or disruptive behaviors. 0/1   Patient requires 3 or more hours of individualized nursing care per 8-hour shift (i.e. for ADLs, meds, therapeutic interventions). 1/1   TOTAL 4

## 2024-04-08 NOTE — PLAN OF CARE
04/08/24 1533   Group Therapy Session   Group Attendance attended group session   Time Session Began 1415   Time Session Ended 1500   Total Time (minutes) 45   Total # Attendees 4   Group Type psychotherapeutic   Group Topic Covered structured socialization;emotions/expression;self-care activities   Group Session Detail Somatic/ feelings in the body- discussed polyvagal theory and Person Centered I statements   Patient Response/Contribution cooperative with task;did not share thoughts verbally;left the group on several occasions   Patient Participation Detail mood reported as anxious, optimistic and bored. Pt was needing some repetition, not following group discussion, a bit in own world with coloring etc, affect is brighter and improving. They left group early to use the bathroom and did not return.     Goal Outcome Evaluation:

## 2024-04-08 NOTE — PLAN OF CARE
Team Note Due:  Friday    Assessment/Intervention/Current Symtoms and Care Coordination:  Chart review, discussed pt progress, symptomology, and response to treatment.  Discussed the discharge plan and any potential impediments to discharge.    Tasks Completed:  - CTC received email from Aminta with [facility] noting she sent the link and reminder regarding intake for substance use program to pt. CTC responded to share that pt does not have access to his email while inpatient and asked for link to the meeting and meeting information to be sent to CTC to help facilitate this.  - Met with pt to discuss DBT program. Pt reports he is leaning more toward this program for discharge planning and reports this is currently his number one choice. CTC went over several programs close to his group home with pt and pt signed ROIs for these programs. CTC will submit referrals for DBT. CTC also reminded pt regarding intake with substance use program at [facility] and shared that CTC is awaiting link to support pt with participating in this later today. Pt reported understanding.   - CTC called pt's , Cristy. Cristy reported that the KIERRA program through [facility] needs a VAL in order to communicate and facilitate the intake. Cristy also noted that they will reschedule intake closer to pt's discharge date. CTC discussed discharge options such as IRTS and DBT. CTC shared that IRTS is not currently an available option as pt's medicaid lapsed at the end of March. Cristy reports she will look into this. Cristy expressed concern regarding DBT program as pt has been scheduled for an intake several times with DBT groups in the past and has not followed through with the program. CTC shared that DBT-adherent programs are different than DBT groups and explained the program differences. CTC inquired which programs pt has attended previously  and Cristy acknowledged pt has not attended a  "DBT program as long as she has been working with him. Cristy reported that when she met with him previously, the plan was for pt to do the [facility] program through [facility] and work on doing activities in the community instead, as pt has been reporting a lack of purpose. [facility] reiterated that DBT would be the recommendation given pt's diagnosis and it being best practice for individuals with borderline personality disorder. Cristy reported that she doesn't want to be a barrier to submitting DBT referrals but noted hesitance with this program as she feels pt would most benefit from the KIERRA program at this time noting that his substance use is \"significant\" and there is \"daily use\". 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 CTC shared that pt is currently prescribed Adderall and Cristy reported that his outpatient psychiatrist is aware of this and has continued to prescribe Adderall as his abuse has been inconsistent. Cristy is requesting care conference to discuss with pt, as well. [facility] will coordinate.   - CTC met with pt to share that KIERRA intake will be rescheduled closer to discharge date. CTC discussed Cristy's recommendations for the KIERRA program and shared concern regarding difficulty with going to both KIERRA and DBT concurrently given pt's difficulty with following through after hospitalization. Pt shared he would like to do both because it would keep him busy and part of the issue is he gets \"lazy\" and \"bored\" when he leaves the hospital. Pt reports he uses pills to numb his feelings because he doesn't know how to manage them. Pt reiterates he would like to learn skills to manage his emotions and acknowledged that his current coping skills lead to more long-term issues. Additionally, pt reports that he feels he lacks purpose which has led to a lack of " "motivation. [facility] validated this and discussed whether pt feels he would be able to do other activities to provide purpose if he does not have the skills to effectively manage his emotions. Pt acknowledged this would be a challenge. [facility] discussed scheduling care conference with  and pt agreed. Pt signed VAL for [facility] program.   - Sent VAL via secure email to [facility] program.  - [facility] sent email to pt's  to coordinate care conference.    Discharge Plan or Goal:  Current plan is to stabilize symptoms and develop safe disposition plan. Following hospitalization, plan is for IRTS or group home with outpatient support (DBT program).     Barriers to Discharge:  Pt continues to endorse command hallucinations to harm others. Pt reports he will not act on these. Pt remains on 1:1 SIO due to HI. Further stabilization needed.     Referral Status:    IRTS Referrals:  [facility] - Submitted 4/3/2024  Contact: Kait Noonan,   Direct dial: [phone]/fax [phone]  Direct email: [email or web address]  General Email: [email or web address]    [facility] - Submitted 4/3/2024  [place]: [phone]  Fax: [phone]   4/4/2024: Declined due to \"not taking any additional insulin dependent clients at this time\"  [facility]: [phone]  Fax: [phone]    DBT Programs:  [facility]  Address: [address], [place] [address]  Phone: [phone]  4/8/2024: Pt added to waitlist. [facility] provided writer's phone number to schedule intake.    [facility]  Address: [address], [place] [address]  Phone: [phone]  Fax: [phone]  4/8/2024: Left message to inquire about referral process for DBT program and current openings.    [facility]  Address: [address] [place] [address]  Phone: " [phone]  4/8/2024: Left message to inquire about referral process for DBT program and current openings. Received return voicemail noting this is not an intensive program and they would only offer 1x/weekly DBT group. They do not accept anyone with significant SIB - not appropriate referral.    [facility]  Address: [address] [place] [address]  Phone: [phone]  Fax: [phone]  4/8/2024: Left message for Jeniffer to submit referral for DBT program.     Legal Status:  Revoked PD   County: [place]  File Number: [id number]    Contacts:  Psychiatrist/Medication Management:  Name/Organization: Regine Last CNP, APRN [facility]  Phone: [phone]  Fax: [phone]    Therapist:  Name/Organization: Joshua  [facility] - VAL signed  Phone: [phone]     :  Name/Organization: Cristy Ji  [facility] - VAL signed  Phone: [phone]    Group Home:  Name/Organization: [facility] Director  [facility] - VAL signed  Phone: [phone]    [facility]:  Name/Organization: Deena Ibarra - VAL signed  Phone: [phone]     CADI Worker:  Name/Organization: Pretty Guzman  [facility] - VAL signed  Phone: [phone]     Upcoming Meetings and Dates/Important Information and next steps:  - Follow-up with pt's .      Pt is a restricted recipient:  Per chart: Insurance Type: Medicaid  Contracted Service Provider: [facility] Provider ID: [id number]     Pt is restricted Provider number [id number], [facility] is a provider for a Restricted Recipient for: Pharmacy    Provider number [id number], JENNIFER LEY is a provider for a Restricted Recipient for: Physician Services ,  Nurse Practitioner Services    Provider number [id number], [facility] is a provider for a Restricted Recipient for: Physician Services ,  Inpatient Hospital ,  Diagnostic Lab ,   [facility]    Provider number [id number], [facility] [employer] is a provider for a Restricted   RRP Referral Needed within 90 days of Service
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
## 2024-04-08 NOTE — PLAN OF CARE
04/08/24 1427   Group Therapy Session   Group Attendance attended group session   Time Session Began 1300   Time Session Ended 1345   Total Time (minutes) 25 - no charge   Total # Attendees 3   Group Type life skill;task skill;other (see comments)  (OT)   Group Topic Covered balanced lifestyle;coping skills/lifestyle management;leisure exploration/use of leisure time;structured socialization   Group Session Detail OT Clinic: Activity group for creative expression, promoting autonomy, building self worth, coping with stress and symptoms, and an opportunity to exercise cognitive skills (I.e. initiation, planning, organizing, sequencing, sustained attention, follow through, and overall self awareness).   Patient Response/Contribution able to recall/repeat info presented;discussed personal experience with topic;expressed understanding of topic   Patient Participation Detail Pt requested this type of group as no patients were coming and he was sitting in the lounge. Noted he had been sleeping in the morning, so missed group. Affect was bright throughout the session, had to leave to meet with provider at one point, and did return. Often using humor with staff. Focused on his structured creative activity and asked staff if they were able to laminate and if he discharged today, how this could be accomplished. Staff noted what might be possible for him if this would happen.

## 2024-04-08 NOTE — PLAN OF CARE
PRIMARY DIAGNOSIS: ACUTE RENAL COLIC    OUTPATIENT/OBSERVATION GOALS TO BE MET BEFORE DISCHARGE  1. Pain Status: Improved but still requiring IV narcotics.    2. Tolerating adequate PO diet: NPO at 00:00    3. Surgical Intervention planned: Pending urology consult    4. Cleared by consultants (if involved): No - to see in AM    5. Return to near baseline physical activity: Yes    Discharge Planner Nurse   Safe discharge environment identified: Yes  Barriers to discharge: No       Entered by: Jyothi Dale 10/14/2018 1:27 AM     Please review provider order for any additional goals.   Nurse to notify provider when observation goals have been met and patient is ready for discharge.    VSS. Pt rating pain 5/10. Denies further intervention for pain. Pt describes left flank pain as constant and sharp. Pt denies dysuria or increased pain while urinating. Pt up ambulating around unit independently with spouse. NPO at midnight for urology consult in AM and possible procedure. Pt denies nausea at this time.    Use Enhanced Medication Counseling?: No High Dose Vitamin A Pregnancy And Lactation Text: High dose vitamin A therapy is contraindicated during pregnancy and breast feeding. Benzoyl Peroxide Counseling: Patient counseled that medicine may cause skin irritation and bleach clothing.  In the event of skin irritation, the patient was advised to reduce the amount of the drug applied or use it less frequently.   The patient verbalized understanding of the proper use and possible adverse effects of benzoyl peroxide.  All of the patient's questions and concerns were addressed. Topical Retinoid counseling:  Patient advised to apply a pea-sized amount only at bedtime and wait 30 minutes after washing their face before applying.  If too drying, patient may add a non-comedogenic moisturizer. The patient verbalized understanding of the proper use and possible adverse effects of retinoids.  All of the patient's questions and concerns were addressed. High Dose Vitamin A Counseling: Side effects reviewed, pt to contact office should one occur. Sarecycline Counseling: Patient advised regarding possible photosensitivity and discoloration of the teeth, skin, lips, tongue and gums.  Patient instructed to avoid sunlight, if possible.  When exposed to sunlight, patients should wear protective clothing, sunglasses, and sunscreen.  The patient was instructed to call the office immediately if the following severe adverse effects occur:  hearing changes, easy bruising/bleeding, severe headache, or vision changes.  The patient verbalized understanding of the proper use and possible adverse effects of sarecycline.  All of the patient's questions and concerns were addressed. Tazorac Counseling:  Patient advised that medication is irritating and drying.  Patient may need to apply sparingly and wash off after an hour before eventually leaving it on overnight.  The patient verbalized understanding of the proper use and possible adverse effects of tazorac.  All of the patient's questions and concerns were addressed. Bactrim Counseling:  I discussed with the patient the risks of sulfa antibiotics including but not limited to GI upset, allergic reaction, drug rash, diarrhea, dizziness, photosensitivity, and yeast infections.  Rarely, more serious reactions can occur including but not limited to aplastic anemia, agranulocytosis, methemoglobinemia, blood dyscrasias, liver or kidney failure, lung infiltrates or desquamative/blistering drug rashes. Topical Clindamycin Counseling: Patient counseled that this medication may cause skin irritation or allergic reactions.  In the event of skin irritation, the patient was advised to reduce the amount of the drug applied or use it less frequently.   The patient verbalized understanding of the proper use and possible adverse effects of clindamycin.  All of the patient's questions and concerns were addressed. Azithromycin Counseling:  I discussed with the patient the risks of azithromycin including but not limited to GI upset, allergic reaction, drug rash, diarrhea, and yeast infections. Topical Retinoid Pregnancy And Lactation Text: This medication is Pregnancy Category C. It is unknown if this medication is excreted in breast milk. Tetracycline Counseling: Patient counseled regarding possible photosensitivity and increased risk for sunburn.  Patient instructed to avoid sunlight, if possible.  When exposed to sunlight, patients should wear protective clothing, sunglasses, and sunscreen.  The patient was instructed to call the office immediately if the following severe adverse effects occur:  hearing changes, easy bruising/bleeding, severe headache, or vision changes.  The patient verbalized understanding of the proper use and possible adverse effects of tetracycline.  All of the patient's questions and concerns were addressed. Patient understands to avoid pregnancy while on therapy due to potential birth defects. Azithromycin Pregnancy And Lactation Text: This medication is considered safe during pregnancy and is also secreted in breast milk. Spironolactone Pregnancy And Lactation Text: This medication can cause feminization of the male fetus and should be avoided during pregnancy. The active metabolite is also found in breast milk. Minocycline Counseling: Patient advised regarding possible photosensitivity and discoloration of the teeth, skin, lips, tongue and gums.  Patient instructed to avoid sunlight, if possible.  When exposed to sunlight, patients should wear protective clothing, sunglasses, and sunscreen.  The patient was instructed to call the office immediately if the following severe adverse effects occur:  hearing changes, easy bruising/bleeding, severe headache, or vision changes.  The patient verbalized understanding of the proper use and possible adverse effects of minocycline.  All of the patient's questions and concerns were addressed. Winlevi Pregnancy And Lactation Text: This medication is considered safe during pregnancy and breastfeeding. Topical Clindamycin Pregnancy And Lactation Text: This medication is Pregnancy Category B and is considered safe during pregnancy. It is unknown if it is excreted in breast milk. Doxycycline Counseling:  Patient counseled regarding possible photosensitivity and increased risk for sunburn.  Patient instructed to avoid sunlight, if possible.  When exposed to sunlight, patients should wear protective clothing, sunglasses, and sunscreen.  The patient was instructed to call the office immediately if the following severe adverse effects occur:  hearing changes, easy bruising/bleeding, severe headache, or vision changes.  The patient verbalized understanding of the proper use and possible adverse effects of doxycycline.  All of the patient's questions and concerns were addressed. Spironolactone Counseling: Patient advised regarding risks of diarrhea, abdominal pain, hyperkalemia, birth defects (for female patients), liver toxicity and renal toxicity. The patient may need blood work to monitor liver and kidney function and potassium levels while on therapy. The patient verbalized understanding of the proper use and possible adverse effects of spironolactone.  All of the patient's questions and concerns were addressed. Dapsone Pregnancy And Lactation Text: This medication is Pregnancy Category C and is not considered safe during pregnancy or breast feeding. Tetracycline Pregnancy And Lactation Text: This medication is Pregnancy Category D and not consider safe during pregnancy. It is also excreted in breast milk. Winlevi Counseling:  I discussed with the patient the risks of topical clascoterone including but not limited to erythema, scaling, itching, and stinging. Patient voiced their understanding. Bactrim Pregnancy And Lactation Text: This medication is Pregnancy Category D and is known to cause fetal risk.  It is also excreted in breast milk. Tazorac Pregnancy And Lactation Text: This medication is not safe during pregnancy. It is unknown if this medication is excreted in breast milk. Dapsone Counseling: I discussed with the patient the risks of dapsone including but not limited to hemolytic anemia, agranulocytosis, rashes, methemoglobinemia, kidney failure, peripheral neuropathy, headaches, GI upset, and liver toxicity.  Patients who start dapsone require monitoring including baseline LFTs and weekly CBCs for the first month, then every month thereafter.  The patient verbalized understanding of the proper use and possible adverse effects of dapsone.  All of the patient's questions and concerns were addressed. Doxycycline Pregnancy And Lactation Text: This medication is Pregnancy Category D and not consider safe during pregnancy. It is also excreted in breast milk but is considered safe for shorter treatment courses. Detail Level: Zone Isotretinoin Counseling: Patient should get monthly blood tests, not donate blood, not drive at night if vision affected, not share medication, and not undergo elective surgery for 6 months after tx completed. Side effects reviewed, pt to contact office should one occur. Topical Sulfur Applications Pregnancy And Lactation Text: This medication is Pregnancy Category C and has an unknown safety profile during pregnancy. It is unknown if this topical medication is excreted in breast milk. Azelaic Acid Pregnancy And Lactation Text: This medication is considered safe during pregnancy and breast feeding. Erythromycin Counseling:  I discussed with the patient the risks of erythromycin including but not limited to GI upset, allergic reaction, drug rash, diarrhea, increase in liver enzymes, and yeast infections. Benzoyl Peroxide Pregnancy And Lactation Text: This medication is Pregnancy Category C. It is unknown if benzoyl peroxide is excreted in breast milk. Isotretinoin Pregnancy And Lactation Text: This medication is Pregnancy Category X and is considered extremely dangerous during pregnancy. It is unknown if it is excreted in breast milk. Birth Control Pills Counseling: Birth Control Pill Counseling: I discussed with the patient the potential side effects of OCPs including but not limited to increased risk of stroke, heart attack, thrombophlebitis, deep venous thrombosis, hepatic adenomas, breast changes, GI upset, headaches, and depression.  The patient verbalized understanding of the proper use and possible adverse effects of OCPs. All of the patient's questions and concerns were addressed. Erythromycin Pregnancy And Lactation Text: This medication is Pregnancy Category B and is considered safe during pregnancy. It is also excreted in breast milk. Azelaic Acid Counseling: Patient counseled that medicine may cause skin irritation and to avoid applying near the eyes.  In the event of skin irritation, the patient was advised to reduce the amount of the drug applied or use it less frequently.   The patient verbalized understanding of the proper use and possible adverse effects of azelaic acid.  All of the patient's questions and concerns were addressed. Birth Control Pills Pregnancy And Lactation Text: This medication should be avoided if pregnant and for the first 30 days post-partum. Topical Sulfur Applications Counseling: Topical Sulfur Counseling: Patient counseled that this medication may cause skin irritation or allergic reactions.  In the event of skin irritation, the patient was advised to reduce the amount of the drug applied or use it less frequently.   The patient verbalized understanding of the proper use and possible adverse effects of topical sulfur application.  All of the patient's questions and concerns were addressed. Aklief Pregnancy And Lactation Text: It is unknown if this medication is safe to use during pregnancy.  It is unknown if this medication is excreted in breast milk.  Breastfeeding women should use the topical cream on the smallest area of the skin for the shortest time needed while breastfeeding.  Do not apply to nipple and areola. Aklief counseling:  Patient advised to apply a pea-sized amount only at bedtime and wait 30 minutes after washing their face before applying.  If too drying, patient may add a non-comedogenic moisturizer.  The most commonly reported side effects including irritation, redness, scaling, dryness, stinging, burning, itching, and increased risk of sunburn.  The patient verbalized understanding of the proper use and possible adverse effects of retinoids.  All of the patient's questions and concerns were addressed.

## 2024-04-08 NOTE — PLAN OF CARE
Problem: Suicide Risk  Goal: Absence of Self-Harm  Outcome: Progressing         Pt has spent this shift making phone calls, attending scheduled programming and resting in his room. Pt reports feeling anxious about meeting with new provider this shift, encouraged several times that he should meet provider and make needs known. Pt appeared restless and visibly anxious this morning, appeared much more relaxed after receiving scheduled Klonopin. Pt reports CAH are still present but at an intensity of 5/10 rather than 9/10 like he reported last week, reports they are still telling him to hurt other people, has not made any attempts to harm others today. Pt reports SI/SIB thoughts  but has not engaged in any SIB today and reports he is able to remain safe at this time with staff support. Pt denies VH, has not been noted to be distracted or talking/laughing to self. Pt reports having a difficult time last evening and scratched left hand superficially, bacitracin applied as well as gauze and paper tape over new SIB abrasions. Pt declined topicals this morning including testosterone gel, pleased that he is able to get a can of soda and was informed that he needs to do this prior to 1400 per order. Pt noted to brighten upon approach, has been joking with writer, reports he is feeling hopeful for DBT tx when he discharges. Pt states he is going to attend programming and work on getting off SIO this week. Pt reports nausea and received PRN Zofran and colace, no result at this time and has been eating meals adequately. Pt denies any other medication SE or pain. Pt continues on SIO for HI/SI and has a no roommate order at this time due to HI thoughts. Will continue to monitor and support plan of care.     BG at breakfast 140           1430-Pt agreeable to take scheduled testosterone and this was administered. Pt also asking for PRN of Klonopin, writer asked him to wait until later this evening as he received his scheduled  afternoon nose around 1330, pt agreeable.

## 2024-04-09 LAB
GLUCOSE BLDC GLUCOMTR-MCNC: 108 MG/DL (ref 70–99)
GLUCOSE BLDC GLUCOMTR-MCNC: 136 MG/DL (ref 70–99)

## 2024-04-09 PROCEDURE — 124N000002 HC R&B MH UMMC

## 2024-04-09 PROCEDURE — 250N000013 HC RX MED GY IP 250 OP 250 PS 637: Performed by: NURSE PRACTITIONER

## 2024-04-09 PROCEDURE — 250N000013 HC RX MED GY IP 250 OP 250 PS 637: Performed by: PSYCHIATRY & NEUROLOGY

## 2024-04-09 PROCEDURE — 250N000013 HC RX MED GY IP 250 OP 250 PS 637: Performed by: STUDENT IN AN ORGANIZED HEALTH CARE EDUCATION/TRAINING PROGRAM

## 2024-04-09 PROCEDURE — 250N000013 HC RX MED GY IP 250 OP 250 PS 637: Performed by: ANESTHESIOLOGY

## 2024-04-09 PROCEDURE — A9270 NON-COVERED ITEM OR SERVICE: HCPCS | Performed by: NURSE PRACTITIONER

## 2024-04-09 PROCEDURE — 250N000013 HC RX MED GY IP 250 OP 250 PS 637

## 2024-04-09 PROCEDURE — 99232 SBSQ HOSP IP/OBS MODERATE 35: CPT | Mod: 95 | Performed by: NURSE PRACTITIONER

## 2024-04-09 PROCEDURE — 250N000013 HC RX MED GY IP 250 OP 250 PS 637: Performed by: EMERGENCY MEDICINE

## 2024-04-09 RX ORDER — OLANZAPINE 10 MG/2ML
10 INJECTION, POWDER, FOR SOLUTION INTRAMUSCULAR 3 TIMES DAILY PRN
Status: DISCONTINUED | OUTPATIENT
Start: 2024-04-09 | End: 2024-04-12 | Stop reason: HOSPADM

## 2024-04-09 RX ORDER — OLANZAPINE 10 MG/1
10 TABLET, ORALLY DISINTEGRATING ORAL 3 TIMES DAILY PRN
Status: DISCONTINUED | OUTPATIENT
Start: 2024-04-09 | End: 2024-04-12 | Stop reason: HOSPADM

## 2024-04-09 RX ADMIN — BACITRACIN: 500 OINTMENT TOPICAL at 08:40

## 2024-04-09 RX ADMIN — FLUPHENAZINE HYDROCHLORIDE 2 MG: 1 TABLET, FILM COATED ORAL at 08:34

## 2024-04-09 RX ADMIN — NICOTINE POLACRILEX 4 MG: 4 GUM, CHEWING BUCCAL at 14:39

## 2024-04-09 RX ADMIN — NICOTINE 1 PATCH: 21 PATCH, EXTENDED RELEASE TRANSDERMAL at 08:39

## 2024-04-09 RX ADMIN — CLINDAMYCIN PHOSPHATE: 10 LOTION TOPICAL at 20:58

## 2024-04-09 RX ADMIN — QUETIAPINE FUMARATE 200 MG: 200 TABLET ORAL at 19:17

## 2024-04-09 RX ADMIN — ACETAMINOPHEN 650 MG: 325 TABLET, FILM COATED ORAL at 09:57

## 2024-04-09 RX ADMIN — Medication 1 G: at 08:35

## 2024-04-09 RX ADMIN — INSULIN GLARGINE 25 UNITS: 100 INJECTION, SOLUTION SUBCUTANEOUS at 08:37

## 2024-04-09 RX ADMIN — CLONAZEPAM 0.25 MG: 0.5 TABLET ORAL at 13:27

## 2024-04-09 RX ADMIN — NICOTINE POLACRILEX 4 MG: 4 GUM, CHEWING BUCCAL at 17:23

## 2024-04-09 RX ADMIN — Medication 5 MG: at 19:18

## 2024-04-09 RX ADMIN — QUETIAPINE FUMARATE 50 MG: 50 TABLET ORAL at 19:18

## 2024-04-09 RX ADMIN — FLUPHENAZINE HYDROCHLORIDE 2 MG: 1 TABLET, FILM COATED ORAL at 19:17

## 2024-04-09 RX ADMIN — DOCUSATE SODIUM 100 MG: 50 CAPSULE, LIQUID FILLED ORAL at 09:16

## 2024-04-09 RX ADMIN — POLYETHYLENE GLYCOL 3350 17 G: 17 POWDER, FOR SOLUTION ORAL at 08:34

## 2024-04-09 RX ADMIN — DOCUSATE SODIUM 100 MG: 50 CAPSULE, LIQUID FILLED ORAL at 19:17

## 2024-04-09 RX ADMIN — QUETIAPINE FUMARATE 50 MG: 50 TABLET ORAL at 08:34

## 2024-04-09 RX ADMIN — DOXYCYCLINE HYCLATE 100 MG: 100 CAPSULE ORAL at 08:35

## 2024-04-09 RX ADMIN — NICOTINE POLACRILEX 4 MG: 4 GUM, CHEWING BUCCAL at 11:07

## 2024-04-09 RX ADMIN — LITHIUM CARBONATE 900 MG: 450 TABLET, EXTENDED RELEASE ORAL at 19:17

## 2024-04-09 RX ADMIN — CLONAZEPAM 0.25 MG: 0.5 TABLET ORAL at 08:34

## 2024-04-09 RX ADMIN — NICOTINE POLACRILEX 4 MG: 4 GUM, CHEWING BUCCAL at 19:17

## 2024-04-09 RX ADMIN — OLANZAPINE 10 MG: 10 TABLET, ORALLY DISINTEGRATING ORAL at 11:37

## 2024-04-09 RX ADMIN — DEXTROAMPHETAMINE SULFATE, DEXTROAMPHETAMINE SACCHARATE, AMPHETAMINE SULFATE AND AMPHETAMINE ASPARTATE 15 MG: 3.75; 3.75; 3.75; 3.75 CAPSULE, EXTENDED RELEASE ORAL at 08:35

## 2024-04-09 RX ADMIN — NICOTINE POLACRILEX 4 MG: 4 GUM, CHEWING BUCCAL at 08:51

## 2024-04-09 RX ADMIN — AMLODIPINE BESYLATE 10 MG: 10 TABLET ORAL at 08:35

## 2024-04-09 RX ADMIN — Medication 500 MG: at 08:35

## 2024-04-09 RX ADMIN — ROSUVASTATIN 40 MG: 20 TABLET, FILM COATED ORAL at 08:35

## 2024-04-09 RX ADMIN — Medication 25 MG: at 14:07

## 2024-04-09 RX ADMIN — EMPAGLIFLOZIN 10 MG: 10 TABLET, FILM COATED ORAL at 08:35

## 2024-04-09 RX ADMIN — NICOTINE POLACRILEX 4 MG: 4 GUM, CHEWING BUCCAL at 20:58

## 2024-04-09 RX ADMIN — TESTOSTERONE 50 MG OF TESTOSTERONE: 50 GEL TRANSDERMAL at 08:34

## 2024-04-09 RX ADMIN — ZINC SULFATE 220 MG (50 MG) CAPSULE 220 MG: CAPSULE at 08:35

## 2024-04-09 RX ADMIN — BACITRACIN: 500 OINTMENT TOPICAL at 19:18

## 2024-04-09 RX ADMIN — ARIPIPRAZOLE 5 MG: 5 TABLET ORAL at 08:35

## 2024-04-09 RX ADMIN — NICOTINE POLACRILEX 4 MG: 4 GUM, CHEWING BUCCAL at 10:00

## 2024-04-09 RX ADMIN — DEUTETRABENAZINE 6 MG: 6 TABLET, COATED ORAL at 08:37

## 2024-04-09 RX ADMIN — CLONAZEPAM 0.25 MG: 0.5 TABLET ORAL at 18:20

## 2024-04-09 RX ADMIN — PAROXETINE HYDROCHLORIDE 10 MG: 10 TABLET, FILM COATED ORAL at 08:35

## 2024-04-09 RX ADMIN — DOXYCYCLINE HYCLATE 100 MG: 100 CAPSULE ORAL at 19:18

## 2024-04-09 ASSESSMENT — ACTIVITIES OF DAILY LIVING (ADL)
ADLS_ACUITY_SCORE: 55
ADLS_ACUITY_SCORE: 55
DRESS: INDEPENDENT
ADLS_ACUITY_SCORE: 55
ADLS_ACUITY_SCORE: 55
ORAL_HYGIENE: INDEPENDENT
ADLS_ACUITY_SCORE: 55
HYGIENE/GROOMING: INDEPENDENT
ADLS_ACUITY_SCORE: 55
LAUNDRY: WITH SUPERVISION
ADLS_ACUITY_SCORE: 55

## 2024-04-09 NOTE — PLAN OF CARE
Occupational Therapy Group Note:     04/09/24 1126   Group Therapy Session   Group Attendance attended group session   Time Session Began 1015   Time Session Ended 1105   Total Time (minutes) 50   Total # Attendees 4   Group Type expressive therapy;task skill   Group Topic Covered cognitive activities;coping skills/lifestyle management;emotions/expression;leisure exploration/use of leisure time;relaxation techniques;structured socialization   Group Session Detail OT Clinic Group   Patient Response/Contribution confronted peers appropriately;cooperative with task;listened actively   Patient Participation Detail Pt actively participated in occupational therapy clinic to facilitate coping skill exploration, creative expression within personally meaningful activities, and clinical observation of social, cognitive, and kinesthetic performance skills. Patient was accompanied by SIO staff throughout group. Patient verbalized frustration with a disruptive/loud peer on the unit; which may deter him from being in the milieu/groups. Pt response: supervision to initiate, gather materials, sequence, and adjust to workspace demands as needed. Demonstrated good focus, planning, and problem solving for selected 1 step creative coloring task and cognitive puzzles. Patient worked attentively on tasks for full duration of group. Able to ask for assistance as needed, and appropriate with peers and staff. Affect: blunted. Mood: calm, cooperative, pleasant.
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
## 2024-04-09 NOTE — PLAN OF CARE
Problem: Anxiety Signs/Symptoms  Goal: Enhanced Social, Occupational or Functional Skills (Anxiety Signs/Symptoms)  Outcome: Not Progressing         Pt has had a difficult shift due to a very loud and threatening peer that is making pt feel scared to be in the common areas. Pt reports he is attempting to attend groups but will not attend if said peer is in group, writer reaffirmed that it is understandable if he needs a break from the loud milieu and encouraged him to utilize his private room and either ear plugs or head phone if noise is difficult for him. Pt reports CAH are very loud today in addition to unit noise but he has no intent to act on the hallucinations, denies SI to writer but reports HI and SIB thoughts due to CAH. Pt reports he just wants to discharge and feels he could maintain safety in the community, encouraged to speak with provider about this. Pt has been utilizing PRNs this shift due to increased agitation and anxiety from peer, has received PRN Zyprexa and seroquel in addition to scheduled Klonopin. Pt received PRN colace with result this shift. Pt has been help seeking when struggling and makes needs known appropriately, has not been aggressive or assaultive and has not engaged in any SIB this shift.   Pt has not been noted to be actively RIS, continues on no roommate order and SIO for HI/SI. Writer applied bacitracin to pt left hand and wrist for superficial SIB abrasions, covered with gauze and paper tape per pt request. Pt reports HA and received PRN Tylenol with fair relief. Pt had no other physical complaints or reports of medication SE, eating well. Will continue to monitor and support plan of care.

## 2024-04-09 NOTE — DISCHARGE INSTRUCTIONS
Behavioral Discharge Planning and Instructions    Summary: You were admitted on 3/25/2024 due to Psychotic Symptomology.  You were treated by Cortez Kruger MD and BROOKLYN Lugo, CNP and discharged on 4/12/2024 from [facility]    Main Diagnosis:   Schizoaffective disorder bipolar type, current episode depressed, severe, with psychosis  Borderline personality disorder  Posttraumatic stress disorder     Commitment:   You were dually committed to the [facility] the [facility] on February 9, 2024 and you are being discharged on a Provisional Discharge Agreement which shall remain in effect for the duration of the Commitment which expires on December 14, 2024. You are also court ordered to take the medications that the doctor ordered for you.     While you are under civil commitment, you are required to continue working with your  and follow the provisions under your provisional discharge agreement.     :  Name/Organization: Cristy Ji  [facility]   Phone: [phone]    Health Care Follow-up:  Appointment Type: DBT Intake  Appointment Date/Time: Tuesday, April 16, 2024 @ 3 PM *Telehealth*     Provider: Krysten Gonsales LPC  Address: [address] [place] [address]     Phone Number: [phone]  Fax Number: [phone]    Follow-up appointments: April 24 @ 2 PM, April 30 @ 3 PM, May 7 @ 4 PM    Appointment Type: Primary Care  Appointment Date/Time: Thursday, April 18 @  9:15 AM *In-Person*  Provider: Becki Avery MD  Address: [address] [place] [address]  Phone: [phone]    Appointment Type: Psychiatry  Appointment Date/Time: Thursday, April 25, 2024 @ 1:45 PM  *Telehealth*  Provider: BROOKLYN Calvin CNP  Address: [address] [place] [id number]   Phone: [phone]    Information will be faxed to your  outpatient providers to ensure a healthy continuity of care for you.     Attend all scheduled appointments with your outpatient providers. Call at least 24 hours in advance if you need to reschedule an appointment to ensure continued access to your outpatient providers.     Major Treatments, Procedures and Findings:  You were provided with: a psychiatric assessment, assessed for medical stability, medication evaluation and/or management, group therapy, family therapy, individual therapy, milieu management, and medical interventions    Symptoms to Report: feeling more aggressive, increased confusion, losing more sleep, mood getting worse, or thoughts of suicide    Early warning signs can include: increased depression or anxiety sleep disturbances increased thoughts or behaviors of suicide or self-harm     Safety and Wellness:  Take all medicines as directed. Make no changes unless your doctor suggests them. Follow treatment recommendations. Refrain from alcohol and non-prescribed drugs. Ask your support system to help you reduce your access to items that could harm yourself or others. Items could include:    Firearms  Medicines (both prescribed and over-the-counter)  Knives and other sharp objects  Ropes and like materials  Car keys  If there is a concern for safety, call 911.     Resources:   Crisis Intervention: 562.189.7475 or 921-018-8590 (TTY: 168.983.6869).  Call anytime for help.  National [place] on Mental Illness (www.[place].jhonatan.org): 624.784.1607 or 292-206-0514.  Alcoholics Anonymous (www.alcoholics-anonymous.org): Check your phone book for your local chapter.  Suicide Awareness Voices of Education (SAVE) (www.save.org): 284-219-JPTH (8824)  National Suicide Prevention Line (www.mentalhealthmn.org): 871-876-SSPV (8603)  Mental Health Consumer/Survivor Network of [place] (www.mhcsn.net): 176.615.1708 or 587-262-6454  Mental Health Association of [place] (www.mentalhealth.org): 545.992.8809 or 464-364-8473  Self-  "Management and Recovery Training., SMART-- Toll free: 188.964.8673  www.Alleantia.Car in the Cloud  [facility] Crisis (COPE) Response - Adult 926 020-8908  [facility] Crisis Response - Adult 309 877-2971  Text 4 Life: txt \"LIFE\" to 96983 for immediate support and crisis intervention  Crisis text line: Text \"[place]\" to 621531. Free, confidential, 24/7.  Crisis Intervention: 692.391.7766 or 022-030-1520. Call anytime for help.      [facility] (National [place] on Mental Illness) improves the lives of children and adults with mental illnesses and their families by providing free classes on mental illnesses and support groups for adults with mental illnesses, parents and family members. For more information: Phone: 590.486.8426 Toll free: 7-777-AGXZ-Powtoon Website: www.ClassifEye.Car in the Cloudhttp://www.ClassifEye.org/    Ground Techniques:  When we feel anxious, distressed, or panicked, it can be helpful to practice grounding exercises. Grounding exercises are activities that can help to calm us down and bring us back to the present moment. There are many different grounding exercises available; just a few are listed below. Practice some and see which ones you like; you can always put your own spin on an activity as well.    The Box Breathing Method:  When we become distressed, our breathing speeds up and our hearts start pumping faster to prepare our bodies to run or fight. This was a response that evolved to help us run away from physical dangers - like a bear charging at us while we are trying to hunt - thousands of years ago. We (for the most part) no longer face these kinds of danger, but our bodies don't know this, so we have retained this physical response to other types of stress. Since our modern-day types of stress rarely have a clear end point like a bear attack might, our bodies have a hard time knowing when they can stop this physical response. Luckily, our bodies have a type of \"off switch\" for stress- the " parasympathetic nervous system- that we can take advantage of. By controlling our breathing, we are able to communicate to our bodies that we are not in danger and can turn off the alarm system. It may take a moment, but if you practice the exercise below, you will be able to calm your body down.     Close your eyes. Breathe in through your nose while counting to four slowly. Try to take deep breaths that go to your belly- rather than your chest.  Hold your breath inside while counting slowly to four. Try not to clamp your mouth or nose shut.   Begin to slowly exhale for 4 seconds. Purse your lips like you are blowing up a balloon.  Repeat steps 1 through 3 as many times as you need to.    Engage your five senses:  Put your hands in cold water or hold a piece of ice.   or touch items near you.   Breathe deeply.   Savor a food or drink. Sour or tangy foods/drinks work best as they grab at your senses to pull you out of the thought on which you are ruminating.  Take a short walk.   Savor a scent.   Move your body.    5,4,3,2,1 Grounding Exercise   The  5,4,3,2,1  exercise is a common sensory awareness grounding exercise that many find helpful to relax or get through difficult moments.  Describe 5 things you can see in the room.  Name 4 things you can feel ( my feet on the floor  or  the air in my nose ).  Name 3 things you are hear right now ( traffic outside ).  Name 2 things you can smell right now (if you can't smell anything right now, you can get up and find scents; describe these to yourself).  Name 1 thing you can taste (again- if you can't taste anything right now, you can go get a snack or a drink and describe this flavor to yourself).    The Butterfly Tapping Method  Cross your arms and put each hand on the opposite shoulder or link your thumbs facing you and put your hands on your chest. Begin tapping your hands against your shoulders/chest at a pace you find calming. Keep doing this for as long as  "you need to and practice saying affirmations that you find beneficial. For example, you can repeat \"things have been okay before and they will be okay again.\"    General Medication Instructions:   See your medication sheet(s) for instructions.   Take all medicines as directed.  Make no changes unless your doctor suggests them.   Go to all your doctor visits.  Be sure to have all your required lab tests. This way, your medicines can be refilled on time.  Do not use any drugs not prescribed by your doctor.  Avoid alcohol.    Advance Directives:   Scanned document on file with [employer]? No scanned doc  Is document scanned? No. Copy Requested.  Honoring Choices Your Rights Handout: Informed and given  Was more information offered? Pt declined    The Treatment team has appreciated the opportunity to work with you. If you have any questions or concerns about your recent admission, you can contact the unit which can receive your call 24 hours a day, 7 days a week. They will be able to get in touch with a Provider if needed. The unit number is [phone].  "

## 2024-04-09 NOTE — PLAN OF CARE
BEH IP Unit Acuity Rating Score (UARS)  Patient is given one point for every criteria they meet.    CRITERIA SCORING   On a 72 hour hold, court hold, committed, stay of commitment, or revocation. 1/1    Patient LOS on BEH unit exceeds 20 days. 0/1  LOS: 13   Patient under guardianship, 55+, otherwise medically complex, or under age 11. 0/1   Suicide ideation without relief of precipitating factors. 1/1   Current plan for suicide. 0/1   Current plan for homicide. 0/1   Imminent risk or actual attempt to seriously harm another without relief of factors precipitating the attempt. 0/1   Severe dysfunction in daily living (ex: complete neglect for self care, extreme disruption in vegetative function, extreme deterioration in social interactions). 0/1   Recent (last 7 days) or current physical aggression in the ED or on unit. 0/1   Restraints or seclusion episode in past 72 hours. 0/1   Recent (last 7 days) or current verbal aggression, agitation, yelling, etc., while in the ED or unit. 0/1   Active psychosis. 0/1   Need for constant or near constant redirection (from leaving, from others, etc).  0/1   Intrusive or disruptive behaviors. 0/1   Patient requires 3 or more hours of individualized nursing care per 8-hour shift (i.e. for ADLs, meds, therapeutic interventions). 1/1   TOTAL 3

## 2024-04-09 NOTE — PLAN OF CARE
"Patient has spent the early part of the shift in the milieu. Continues to experience voices for suicidal and self injurious thoughts. Requested prn Seroquel and klonopin for anxiety.  Requested prn tylenol for eye pain. Med compliant. Ate dinner. Attended the evening group. Affect is flat. Voiced a desire to get taken off his SIO. Showered. Cooperative on the unit.     Patient evaluation continues. Assessed mood,anxiety,thoughts and behavior.     Patient gradually progressing towards goals.    Patient is encouraged to participate in groups and assisted to develop healthy coping skills.     VS reviewed: /81   Pulse 75   Temp 99  F (37.2  C) (Oral)   Resp 16   Ht 1.676 m (5' 6\")   Wt 102.8 kg (226 lb 9.6 oz)   SpO2 93%   BMI 36.57 kg/m      Length of stay: 12    Refer to daily team meeting notes for individualized plan of care. Nursing will continue to assess.    "

## 2024-04-09 NOTE — PLAN OF CARE
"Team Note Due:  Friday    Assessment/Intervention/Current Symtoms and Care Coordination:  Chart review, discussed pt progress, symptomology, and response to treatment.  Discussed the discharge plan and any potential impediments to discharge.    Tasks Completed:  - Augustina Menard called back and intake scheduled for next week. AVS updated.  - [facility] notified pt regarding intake for DBT program. Pt asked about care conference with  and CTC shared that this has not been scheduled yet as CTC has not received response from pt's  yet. CTC will follow-up. Pt had no further questions.  - [facility] called and spoke to pt's  and shared update regarding DBT intake with Augustina. Shared that pt would like to have care conference and discussed availability Thursday or Friday this week. Pt's  was driving and will respond to [facility] email with availability.    Discharge Plan or Goal:  Current plan is to stabilize symptoms and develop safe disposition plan. Following hospitalization, plan is for IRTS or group home with outpatient support (DBT program).     Barriers to Discharge:  Pt continues to endorse command hallucinations to harm others. Pt reports he will not act on these. Pt remains on 1:1 SIO due to HI. Further stabilization needed.     Referral Status:    IRTS Referrals:  [facility] Central Intake - Submitted 4/3/2024  Contact: Kait Noonan,   Direct dial: [phone]/fax [phone]  Direct email: [email or web address]  General Email: [email or web address]    [facility] - Submitted 4/3/2024  [place]: [phone]  Fax: [phone]   4/4/2024: Declined due to \"not taking any additional insulin dependent clients at this time\"  [facility]: [phone]  Fax: [phone]    DBT Programs:  Augustina Menard  Address: [address], [address], [place] [address]  Phone: [phone]  4/8/2024: Pt " added to waitlist. [facility] provided writer's phone number to schedule intake.  4/9/2024: Intake scheduled - AVS updated.    [facility]  Address: [address], [address], [place] [address]  Phone: [phone]  Fax: [phone]  4/8/2024: Left message to inquire about referral process for DBT program and current openings.    [facility]  Address: [address], [place] [address]  Phone: [phone]  4/8/2024: Left message to inquire about referral process for DBT program and current openings. Received return voicemail noting this is not an intensive program and they would only offer 1x/weekly DBT group. They do not accept anyone with significant SIB - not appropriate referral.    [facility]  Address: [address] [place] [address]  Phone: [phone]  Fax: [phone]  4/8/2024: Left message for Jeniffer to submit referral for DBT program.     Legal Status:  Revoked    County: [place]  File Number: [id number]    Contacts:  Psychiatrist/Medication Management:  Name/Organization: Regine Last CNP, APRN [facility]  Phone: [phone]  Fax: [phone]    Therapist:  Name/Organization: Joshua  [facility] - VAL signed  Phone: [phone]     :  Name/Organization: Cristy Ji  [facility] - VLA signed  Phone: [phone]    Group Home:  Name/Organization: [facility] Director  [facility] - VAL signed  Phone: [phone]    [facility]:  Name/Organization: Deena Ibarra - VAL signed  Phone: [phone]     CADI Worker:  Name/Organization: Pretty Guzman  [facility] - VAL signed  Phone: [phone]     Upcoming Meetings and Dates/Important Information and next steps:  - Care conference with pt and pt's       Pt is a restricted recipient:  Per chart: Insurance Type: Medicaid  Contracted Service Provider: [facility] Provider ID: [id number]      Pt is restricted Provider number [id number], [facility] is a provider for a Restricted Recipient for: Pharmacy    Provider number [id number], JENNIFER LEY is a provider for a Restricted Recipient for: Physician Services ,  Nurse Practitioner Services    Provider number [id number], [facility] is a provider for a Restricted Recipient for: Physician Services ,  Inpatient Hospital ,  Diagnostic Lab ,  Outpatient Hospital    Provider number [id number], [facility] is a provider for a Restricted   RRP Referral Needed within 90 days of Service

## 2024-04-09 NOTE — PLAN OF CARE
"Occupational Therapy Group Note:     04/09/24 1634   Group Therapy Session   Group Attendance attended group session   Time Session Began 1430   Time Session Ended 1455   Total Time (minutes) 10 (no charge)   Total # Attendees 3   Group Type expressive therapy;task skill   Group Topic Covered cognitive activities;coping skills/lifestyle management;emotions/expression;leisure exploration/use of leisure time;relaxation techniques;structured socialization   Group Session Detail OT Clinic Group   Patient Response/Contribution organized   Patient Participation Detail Patient entered group late. Patient was accompanied by SIO staff. Patient requested unit safe activities in order to work on productive and meaningful activities outside of structured group time; writer provided patient with a variety of cognitive puzzles. Patient declined to work on a goal-directed task in group setting; stating, \"it's just nice to have a quiet place to be.\" Patient exhibited some restless/anxious behaviors during time in group (leg shaking).          "

## 2024-04-10 ENCOUNTER — TELEPHONE (OUTPATIENT)
Dept: PSYCHIATRY | Facility: CLINIC | Age: 34
End: 2024-04-10
Payer: MEDICARE

## 2024-04-10 LAB
GLUCOSE BLDC GLUCOMTR-MCNC: 122 MG/DL (ref 70–99)
GLUCOSE BLDC GLUCOMTR-MCNC: 125 MG/DL (ref 70–99)

## 2024-04-10 PROCEDURE — 250N000013 HC RX MED GY IP 250 OP 250 PS 637

## 2024-04-10 PROCEDURE — 250N000013 HC RX MED GY IP 250 OP 250 PS 637: Performed by: NURSE PRACTITIONER

## 2024-04-10 PROCEDURE — 250N000013 HC RX MED GY IP 250 OP 250 PS 637: Performed by: EMERGENCY MEDICINE

## 2024-04-10 PROCEDURE — 250N000013 HC RX MED GY IP 250 OP 250 PS 637: Performed by: PSYCHIATRY & NEUROLOGY

## 2024-04-10 PROCEDURE — 99232 SBSQ HOSP IP/OBS MODERATE 35: CPT | Mod: 95 | Performed by: NURSE PRACTITIONER

## 2024-04-10 PROCEDURE — 250N000013 HC RX MED GY IP 250 OP 250 PS 637: Performed by: STUDENT IN AN ORGANIZED HEALTH CARE EDUCATION/TRAINING PROGRAM

## 2024-04-10 PROCEDURE — 124N000002 HC R&B MH UMMC

## 2024-04-10 PROCEDURE — A9270 NON-COVERED ITEM OR SERVICE: HCPCS | Performed by: NURSE PRACTITIONER

## 2024-04-10 PROCEDURE — 250N000011 HC RX IP 250 OP 636

## 2024-04-10 PROCEDURE — G0177 OPPS/PHP; TRAIN & EDUC SERV: HCPCS

## 2024-04-10 RX ORDER — CLONAZEPAM 0.5 MG/1
0.25 TABLET ORAL 2 TIMES DAILY PRN
Status: DISCONTINUED | OUTPATIENT
Start: 2024-04-10 | End: 2024-04-12 | Stop reason: HOSPADM

## 2024-04-10 RX ADMIN — DOXYCYCLINE HYCLATE 100 MG: 100 CAPSULE ORAL at 08:05

## 2024-04-10 RX ADMIN — ZINC SULFATE 220 MG (50 MG) CAPSULE 220 MG: CAPSULE at 08:06

## 2024-04-10 RX ADMIN — CLONAZEPAM 0.25 MG: 0.5 TABLET ORAL at 12:06

## 2024-04-10 RX ADMIN — DEUTETRABENAZINE 6 MG: 6 TABLET, COATED ORAL at 08:09

## 2024-04-10 RX ADMIN — ARIPIPRAZOLE 5 MG: 5 TABLET ORAL at 08:09

## 2024-04-10 RX ADMIN — Medication 5 MG: at 19:07

## 2024-04-10 RX ADMIN — PAROXETINE HYDROCHLORIDE 10 MG: 10 TABLET, FILM COATED ORAL at 08:06

## 2024-04-10 RX ADMIN — ACETAMINOPHEN 650 MG: 325 TABLET, FILM COATED ORAL at 17:15

## 2024-04-10 RX ADMIN — QUETIAPINE FUMARATE 50 MG: 50 TABLET ORAL at 08:05

## 2024-04-10 RX ADMIN — DOCUSATE SODIUM 100 MG: 50 CAPSULE, LIQUID FILLED ORAL at 08:04

## 2024-04-10 RX ADMIN — INSULIN GLARGINE 25 UNITS: 100 INJECTION, SOLUTION SUBCUTANEOUS at 08:07

## 2024-04-10 RX ADMIN — NICOTINE POLACRILEX 4 MG: 4 GUM, CHEWING BUCCAL at 16:28

## 2024-04-10 RX ADMIN — NICOTINE POLACRILEX 4 MG: 4 GUM, CHEWING BUCCAL at 18:20

## 2024-04-10 RX ADMIN — Medication 1 G: at 08:08

## 2024-04-10 RX ADMIN — NICOTINE POLACRILEX 4 MG: 4 GUM, CHEWING BUCCAL at 14:33

## 2024-04-10 RX ADMIN — ACETAMINOPHEN 650 MG: 325 TABLET, FILM COATED ORAL at 10:52

## 2024-04-10 RX ADMIN — DOXYCYCLINE HYCLATE 100 MG: 100 CAPSULE ORAL at 19:07

## 2024-04-10 RX ADMIN — NICOTINE POLACRILEX 4 MG: 4 GUM, CHEWING BUCCAL at 09:28

## 2024-04-10 RX ADMIN — NICOTINE 1 PATCH: 21 PATCH, EXTENDED RELEASE TRANSDERMAL at 08:03

## 2024-04-10 RX ADMIN — ROSUVASTATIN 40 MG: 20 TABLET, FILM COATED ORAL at 08:04

## 2024-04-10 RX ADMIN — LITHIUM CARBONATE 900 MG: 450 TABLET, EXTENDED RELEASE ORAL at 19:07

## 2024-04-10 RX ADMIN — Medication 500 MG: at 08:10

## 2024-04-10 RX ADMIN — CLONAZEPAM 0.25 MG: 0.5 TABLET ORAL at 08:05

## 2024-04-10 RX ADMIN — POLYETHYLENE GLYCOL 3350 17 G: 17 POWDER, FOR SOLUTION ORAL at 08:03

## 2024-04-10 RX ADMIN — FLUPHENAZINE HYDROCHLORIDE 2 MG: 1 TABLET, FILM COATED ORAL at 19:06

## 2024-04-10 RX ADMIN — AMLODIPINE BESYLATE 10 MG: 10 TABLET ORAL at 08:06

## 2024-04-10 RX ADMIN — NICOTINE POLACRILEX 4 MG: 4 GUM, CHEWING BUCCAL at 12:06

## 2024-04-10 RX ADMIN — DEXTROAMPHETAMINE SULFATE, DEXTROAMPHETAMINE SACCHARATE, AMPHETAMINE SULFATE AND AMPHETAMINE ASPARTATE 15 MG: 3.75; 3.75; 3.75; 3.75 CAPSULE, EXTENDED RELEASE ORAL at 08:05

## 2024-04-10 RX ADMIN — NICOTINE POLACRILEX 4 MG: 4 GUM, CHEWING BUCCAL at 08:21

## 2024-04-10 RX ADMIN — FLUPHENAZINE HYDROCHLORIDE 2 MG: 1 TABLET, FILM COATED ORAL at 08:06

## 2024-04-10 RX ADMIN — BENZOYL PEROXIDE: 50 LOTION TOPICAL at 08:12

## 2024-04-10 RX ADMIN — NICOTINE POLACRILEX 4 MG: 4 GUM, CHEWING BUCCAL at 19:32

## 2024-04-10 RX ADMIN — BACITRACIN: 500 OINTMENT TOPICAL at 08:14

## 2024-04-10 RX ADMIN — TRAZODONE HYDROCHLORIDE 50 MG: 50 TABLET ORAL at 19:07

## 2024-04-10 RX ADMIN — QUETIAPINE FUMARATE 50 MG: 50 TABLET ORAL at 19:07

## 2024-04-10 RX ADMIN — NICOTINE POLACRILEX 4 MG: 4 GUM, CHEWING BUCCAL at 10:32

## 2024-04-10 RX ADMIN — CLONAZEPAM 0.25 MG: 0.5 TABLET ORAL at 15:00

## 2024-04-10 RX ADMIN — DOCUSATE SODIUM 100 MG: 50 CAPSULE, LIQUID FILLED ORAL at 19:07

## 2024-04-10 RX ADMIN — NICOTINE POLACRILEX 4 MG: 4 GUM, CHEWING BUCCAL at 13:07

## 2024-04-10 RX ADMIN — FLUPHENAZINE DECANOATE 25 MG: 25 INJECTION, SOLUTION INTRAMUSCULAR; SUBCUTANEOUS at 12:07

## 2024-04-10 RX ADMIN — QUETIAPINE FUMARATE 200 MG: 200 TABLET ORAL at 19:06

## 2024-04-10 RX ADMIN — CLINDAMYCIN PHOSPHATE: 10 LOTION TOPICAL at 08:12

## 2024-04-10 RX ADMIN — EMPAGLIFLOZIN 10 MG: 10 TABLET, FILM COATED ORAL at 08:05

## 2024-04-10 RX ADMIN — TESTOSTERONE 50 MG OF TESTOSTERONE: 50 GEL TRANSDERMAL at 08:04

## 2024-04-10 RX ADMIN — CLONAZEPAM 0.25 MG: 0.5 TABLET ORAL at 18:19

## 2024-04-10 ASSESSMENT — ACTIVITIES OF DAILY LIVING (ADL)
ADLS_ACUITY_SCORE: 55
DRESS: INDEPENDENT
ADLS_ACUITY_SCORE: 55
ADLS_ACUITY_SCORE: 55
HYGIENE/GROOMING: INDEPENDENT
HYGIENE/GROOMING: INDEPENDENT
ADLS_ACUITY_SCORE: 55
ORAL_HYGIENE: INDEPENDENT
ADLS_ACUITY_SCORE: 55
LAUNDRY: WITH SUPERVISION

## 2024-04-10 NOTE — PLAN OF CARE
Problem: Adult Inpatient Plan of Care  Goal: Optimal Comfort and Wellbeing  Outcome: Progressing   Goal Outcome Evaluation:             Pt. Continues on a SIO for assault risk     Pt. Hopeful of discharging to his group home tomorrow or Friday, reporting having better coping skills and is ready to go home.  pt. Presents with a flat affect, calm and cooperative, pleasant on approach. Visible in the lounge engaged with peers and staff, laughing.  Attended and participated in groups, making art projects.     Pt has O/A right hand from self scratch, applied bacitracin covered with dressing. No drainage, pt denies hand pain. Reports constipation PRN colace helpful. Speech is clear and coherent, thought process is logical and linear, hygiene is clean.     Pt. Rates anxiety 2/10, low depression, endorses command auditory hallucinations to self harm and harm others, pt reports distractions like going to group and socializing being helpful. Rating 5/10 headache PRN Tylenol administered. Medication compliance, frequent requests for nicotine lozenges. contracted for safety.     Prolixin injected in the RT. Deltoid, pt tolerated well

## 2024-04-10 NOTE — PLAN OF CARE
Pt appeared to sleep for a total of 5.5 hours overnight. No PRN medications were administered, and no concerns were noted. Pt remains on 1:1 SIO (10ft) for ongoing self-harm urges/behaviors, as well as command auditory hallucinations to harm others.     Problem: Sleep Disturbance  Goal: Adequate Sleep/Rest  Outcome: Progressing

## 2024-04-10 NOTE — PLAN OF CARE
"Team Note Due:  Friday    Assessment/Intervention/Current Symtoms and Care Coordination:  Chart review, discussed pt progress, symptomology, and response to treatment.  Discussed the discharge plan and any potential impediments to discharge.    Tasks Completed:  - Tattnall from [employer] that pt's insurance is not currently active. [facility] followed-up with financial counselors.   - CTC heard from financial counselors that pt's renewal has gone through. It will take 1-2 days to receive new policy number from [facility] that will be updated in [facility].   - CTC notified [facility] regarding pt's renewal. They will keep the intake appointment.   - Tattnall from provider regarding planned discharge for Thursday or Friday this week.  - Tattnall from pt's  via email to schedule care coordination meeting. Care coordination meeting scheduled for Thursday at 3:30 PM. Notified pt's  regarding discharge date and intake for DBT.  - Met with pt in conference room to discuss discharge plans. Pt reported he is feeling safe to leave and presented with bright affect and smiling. Pt and CTC discussed care coordination meeting with . Pt asked what the purpose was for the meeting. [facility] explained the DBT program and [facility] program and different recommendations from CTC and pt's . Pt shared with [facility] that he has been to treatment previously and \"I know it will be the same old sh**\" and reported that the [facility] program is harm reduction and \"I can keep using anyway\". Pt acknowledged that he uses pills to numb his feelings/emotions and reported that he feels DBT will be more helpful with learning skills so that he doesn't have to use pills as his only coping skill. Pt reports he is more interested in the DBT program and agrees with this plan for discharge. [facility] will also schedule follow-up psychiatrist appointment for pt for next week. Pt is requesting afternoon appointment. [facility] notified pt that his " "insurance renewal has gone through per our financial counselors and pt reported feeling relieved this was completed.   - CTC placed request with care coordinators to schedule follow-up psychiatry appointment.  - Pt asked CTC if he gets discharged tomorrow if the care coordination conference would still happen. CTC explained that if pt is discharged tomorrow the meeting would not need to happen. Pt shared that he had a message from his CADI waiver worker and asked CTC to follow-up. CTC received VAL from pt for CADI worker.  - CTC called and left message for [facility] to connect with pt's CADI .    Discharge Plan or Goal:  Current plan is to stabilize symptoms and develop safe disposition plan. Following hospitalization, plan is for pt to return to group home with outpatient support (DBT program). Planned discharge for Friday, April 12.     Barriers to Discharge:  Pt reports improved symptoms. At times, pt continues to report SIB urges and has been managing these urges without participating in these behaviors.      Referral Status:    IRTS Referrals:  [facility]ake - Submitted 4/3/2024  Contact: Kait Noonan,   Direct dial: [phone]/fax [phone]  Direct email: [email or web address]  General Email: [email or web address]    [facility] - Submitted 4/3/2024  [place]: [phone]  Fax: [phone]   4/4/2024: Declined due to \"not taking any additional insulin dependent clients at this time\"  [facility]: [phone]  Fax: [phone]    DBT Programs:  [facility]  Address: [address], [place] [address]  Phone: [phone]  4/8/2024: Pt added to waitlist. [facility] provided writer's phone number to schedule intake.  4/9/2024: Intake scheduled - AVS updated.    [facility]  Address: [address], [place] [address]  Phone: [phone]  Fax: " [phone]  4/8/2024: Left message to inquire about referral process for DBT program and current openings.    [facility]  Address: [address], [place] [address]  Phone: [phone]  4/8/2024: Left message to inquire about referral process for DBT program and current openings. Received return voicemail noting this is not an intensive program and they would only offer 1x/weekly DBT group. They do not accept anyone with significant SIB - not appropriate referral.    [facility]  Address: [address] [place] [address] [address], [place] [address]  Phone: [phone]  Fax: [phone]  4/8/2024: Left message for Jeniffer to submit referral for DBT program.     Legal Status:  Revoked PD   County: [place]  File Number: [id number]    Contacts:  Psychiatrist/Medication Management:  Name/Organization: Regine Last, CNP, APRN [facility]  Phone: [phone]  Fax: [phone]    :  Name/Organization: Cristy Ji  [facility] - VAL signed  Phone: [phone]    Group Home:  Name/Organization: [facility] Director  [facility] - VAL signed  Phone: [phone]    [facility]:  Name/Organization: Deena Ibarra - VAL signed  Phone: [phone]     CADI Worker:  Name/Organization: Lydia Britt  [facility]  Phone: [phone]     Upcoming Meetings and Dates/Important Information and next steps:  Care conference with pt and pt's : Thursday, April 11 @ 3:30 PM      Pt is a restricted recipient:  Per chart: Insurance Type: Medicaid  Contracted Service Provider: [facility] Provider ID: [id number]     Pt is restricted Provider number [id number], [facility] is a provider for a Restricted Recipient for: Pharmacy    Provider number [id number], JENNIFER LEY is a provider for a Restricted Recipient for: Physician Services ,  Nurse Practitioner Services    Provider number [id number], [place]  [facility] is a provider for a Restricted Recipient for: Physician Services ,  Inpatient Hospital ,  Diagnostic Lab ,  Outpatient Hospital    Provider number [id number], [facility] is a provider for a Restricted   RRP Referral Needed within 90 days of Service

## 2024-04-10 NOTE — PLAN OF CARE
"Patient has spent majority of the shift in the milieu. Reports having thoughts of suicidal ideation and self injurious thoughts. Patient requested prn klonopin for anxiety. Continues to have command hallucinations to hurt others.Did some scratching on his hand. Encouraged to focus on other things and use coping skills to distract self. Ate dinner and snack.  Med compliant. Completed a puzzle. Cooperative on the unit. Continues to be a 24 hour SIO.     Patient evaluation continues. Assessed mood,anxiety,thoughts and behavior.     Patient gradually progressing towards goals.    Patient is encouraged to participate in groups and assisted to develop healthy coping skills.     VS reviewed: /81   Pulse 79   Temp 97.6  F (36.4  C) (Temporal)   Resp 16   Ht 1.676 m (5' 6\")   Wt 102.8 kg (226 lb 9.6 oz)   SpO2 97%   BMI 36.57 kg/m      Length of stay: 13    Refer to daily team meeting notes for individualized plan of care. Nursing will continue to assess.    "

## 2024-04-10 NOTE — PLAN OF CARE
04/10/24 1241   Group Therapy Session   Group Attendance attended group session   Time Session Began 1115   Time Session Ended 1200   Total Time (minutes) 45   Total # Attendees 2   Group Type life skill;other (see comments)  (OT)   Group Topic Covered balanced lifestyle;coping skills/lifestyle management   Group Session Detail OT - Coping:  Focus of group was on identification of specific coping skills for mental health management using a cognitive/brain activity and then engagement in creating their own journal. The process also includes practice of brainstorming techniques and communication skills in a group setting, listening to others and sharing ideas.   Patient Response/Contribution able to recall/repeat info presented;cooperative with task;discussed personal experience with topic;expressed understanding of topic   Patient Participation Detail Pt easily engaged in the activity and noted what priority coping skills he uses. Noted enjoyment of the creative activity and how to use the journal.

## 2024-04-10 NOTE — PLAN OF CARE
"   04/10/24 1231   Group Therapy Session   Group Attendance attended group session   Time Session Began 1015   Time Session Ended 1100   Total Time (minutes) 45   Total # Attendees 4   Group Type life skill;task skill;other (see comments)  (OT)   Group Topic Covered balanced lifestyle;coping skills/lifestyle management;leisure exploration/use of leisure time;structured socialization   Group Session Detail OT Clinic: Activity group for creative expression, promoting autonomy, building self worth, coping with stress and symptoms, and an opportunity to exercise cognitive skills (I.e. initiation, planning, organizing, sequencing, sustained attention, follow through, and overall self awareness).   Patient Response/Contribution able to recall/repeat info presented;discussed personal experience with topic;expressed understanding of topic;listened actively   Patient Participation Detail Pt initiated coming to group. Accepted completed creative tasks from staff who had laminated his work, thanking staff for this. Affect was bright and he was more social with others. Asked another patient and staff for color ideas, yet did make his own choices and accepted positive feedback. He also added to comments with others about the behavioral code from previous day and when SIO staff noted that it seemed everyone had managed well with a stressful situation. Pt noted that he felt they had not used \"coping skills\". Both staff noted that everyone had and he was able to accept this. Remained focused on his task and talked about his aunt who has a llama farm and that he had not visited her for a long time and might be something he would like to do again. Asked about the current weather and when staff noted it was warming and would be a nice few days, shared that he had [place] last year due to being in the hospital.                              "

## 2024-04-10 NOTE — PLAN OF CARE
"Individual Therapy Note      Date of Service: April 10, 2024    Patient: Prakash goes by \"Prakash,\" uses he/him pronouns    Individuals Present: [facility]    Session start: 1400  Session end: 1430  Session duration in minutes: 30    Patient Active Problem List   Diagnosis    ADHD (attention deficit hyperactivity disorder)    Bipolar 1 disorder, manic, mild    Marijuana abuse    Polysubstance abuse (H)    GERD (gastroesophageal reflux disease)    Tobacco abuse    Intractable back pain    Optic neuritis    Cauda equina syndrome with neurogenic bladder (H)    Schizoaffective disorder, bipolar type (H)    PTSD (post-traumatic stress disorder)    Anxiety    Auditory hallucination    Nephrolithiasis    Cyst of left ovary    Borderline personality disorder (H)    Cannabis use disorder, severe, dependence (H)    Depression    Episodic mood disorder (H24)    History of heroin abuse (H)    Moderate episode of recurrent major depressive disorder (H)    Opioid use disorder, severe, dependence (H)    Substance-induced psychotic disorder with hallucinations (H)    Nausea    Urinary retention    Chronic bilateral low back pain without sciatica    AVA (generalized anxiety disorder)    Aggressive behavior    Gender identity disorder    Lumbosacral radiculopathy at L5    DUB (dysfunctional uterine bleeding)    Seizure-like activity (H)    Acanthosis nigricans    Schizoaffective disorder, chronic condition with acute exacerbation (H)    Bipolar affective disorder, mixed, severe, with psychotic behavior (H)    Schizophrenia, schizoaffective, chronic with acute exacerbation (H)    Akathisia    Hypertension, unspecified type    Female-to-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 person    Morbid obesity (H)    Diabetes mellitus, type 2 (H)    Mood disorder due to a general medical condition    Pain of right hand    Acne vulgaris         Modality Used:Person Centered, Motivational Interviewing, Brief Therapy, and Solution Focused    Goals: " Coping skills    Patient Description of current symptoms: Feeling anxious, baseline      Mental Status Exam:   Attitude: cooperative  Eye Contact: good  Mood: anxious  Affect: appropriate and in normal range and mood congruent  Speech: clear, coherent  Psychomotor Behavior: no evidence of tardive dyskinesia, dystonia, or tics  Thought Process:  logical, linear, and goal oriented  Associations: no loose associations  Thought Content: no evidence of suicidal ideation or homicidal ideation, no evidence of psychotic thought, no auditory hallucinations present, and no visual hallucinations present  Insight: good  Judgement: intact  Attention Span and Concentration: intact    Pt progress: Met pt in room. There was a recent event in the milieu outside of room that was loud and stressful. Pt shared he is trying to use his coping skills. Writer asked what skills he was using and what helps. Pt and writer discussed some more skills to use when anxiety gets ramped up. Discussed changing temps, breathing exercises, movement, music, incorporating affirmations and compassion toward self to combat some of the negative thoughts that tend to spiral, talking with self about being safe and being able to handle the thoughts that are difficult. Writer also suggested to practice sitting with his thoughts for a few minutes before using some distraction techniques. Writer also suggested to pt to get outpatient therapy to work on the root of what is underneath the anxiety. Pt was talking about the suggestion of DBT. Writer spoke of the benefits of DBT and some other therapeutic interventions. Pt discussed feeling like he doesn't have a purpose. When writer asked what he would like his life to look like if he woke up tomorrow and everything was how he wanted it, he had a few direct answers of what he wanted to do, including finishing graduate school, a specific career and focus area. Writer told pt that it sounds like he has a pretty good  idea of his purpose and what he wants. Pt also shared some struggle with mental health as relate to being trans and getting some of the gender affirming care he is seeking. Writer validated the struggle and feelings and encourage pt that he will get through this tough time and get to the point where he is able to live more authentically.     Treatment Objective(s) Addressed:   The focus of this session was on identifying and practicing coping strategies, processing feelings related to anxiety and mental health, identifying an appropriate aftercare plan, building distress tolerance, and building self-esteem     Progress Towards Goals and Assessment of Patient:   Patient is making progress towards treatment goals as evidenced by using coping skills.       Therapeutic Intervention(s):   Provided active listening, unconditional positive regard, and validation. Engaged in cognitive restructuring/ reframing, looked at common cognitive distortions and challenged negative thoughts. Engaged in guided discovery, explored patient's perspectives and helped expand them through socratic dialogue.    Plan/next step: continue to engage in unit programming, check in with therapist as needed    [id number] - Psychotherapy (with patient) - 30 (16-37*) min

## 2024-04-10 NOTE — PLAN OF CARE
BEH IP Unit Acuity Rating Score (UARS)  Patient is given one point for every criteria they meet.    CRITERIA SCORING   On a 72 hour hold, court hold, committed, stay of commitment, or revocation. 1/1    Patient LOS on BEH unit exceeds 20 days. 0/1  LOS: 14   Patient under guardianship, 55+, otherwise medically complex, or under age 11. 0/1   Suicide ideation without relief of precipitating factors. 1/1   Current plan for suicide. 0/1   Current plan for homicide. 0/1   Imminent risk or actual attempt to seriously harm another without relief of factors precipitating the attempt. 0/1   Severe dysfunction in daily living (ex: complete neglect for self care, extreme disruption in vegetative function, extreme deterioration in social interactions). 0/1   Recent (last 7 days) or current physical aggression in the ED or on unit. 0/1   Restraints or seclusion episode in past 72 hours. 0/1   Recent (last 7 days) or current verbal aggression, agitation, yelling, etc., while in the ED or unit. 0/1   Active psychosis. 0/1   Need for constant or near constant redirection (from leaving, from others, etc).  0/1   Intrusive or disruptive behaviors. 0/1   Patient requires 3 or more hours of individualized nursing care per 8-hour shift (i.e. for ADLs, meds, therapeutic interventions). 1/1   TOTAL 3
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
## 2024-04-10 NOTE — TELEPHONE ENCOUNTER
Provider made two attempts at sending links to telehealth appt today and then one attempt to contact patient via phone. Left message indicated that he could call the clinic to reschedule if he would like to do so.

## 2024-04-10 NOTE — PLAN OF CARE
04/10/24 1521   Group Therapy Session   Group Attendance attended group session   Time Session Began 1300   Time Session Ended 1330   Total Time (minutes) 30   Total # Attendees 4   Group Type psychotherapeutic   Group Topic Covered emotions/expression   Group Session Detail Patients participated in a game with associated discussion questions on the topics of emotional expression and self esteem.   Patient Response/Contribution cooperative with task   Patient Participation Detail Patient participated in the game in a minimal way, provided short and at times frivoulous comments during the discussion.  Reported being bored and left the group early.

## 2024-04-11 ENCOUNTER — APPOINTMENT (OUTPATIENT)
Dept: GENERAL RADIOLOGY | Facility: CLINIC | Age: 34
End: 2024-04-11
Payer: MEDICARE

## 2024-04-11 LAB
GLUCOSE BLDC GLUCOMTR-MCNC: 120 MG/DL (ref 70–99)
GLUCOSE BLDC GLUCOMTR-MCNC: 131 MG/DL (ref 70–99)

## 2024-04-11 PROCEDURE — 250N000013 HC RX MED GY IP 250 OP 250 PS 637: Performed by: PSYCHIATRY & NEUROLOGY

## 2024-04-11 PROCEDURE — 250N000013 HC RX MED GY IP 250 OP 250 PS 637: Performed by: NURSE PRACTITIONER

## 2024-04-11 PROCEDURE — 99232 SBSQ HOSP IP/OBS MODERATE 35: CPT | Mod: 95 | Performed by: NURSE PRACTITIONER

## 2024-04-11 PROCEDURE — 124N000002 HC R&B MH UMMC

## 2024-04-11 PROCEDURE — 250N000013 HC RX MED GY IP 250 OP 250 PS 637

## 2024-04-11 PROCEDURE — 250N000013 HC RX MED GY IP 250 OP 250 PS 637: Performed by: STUDENT IN AN ORGANIZED HEALTH CARE EDUCATION/TRAINING PROGRAM

## 2024-04-11 PROCEDURE — 99222 1ST HOSP IP/OBS MODERATE 55: CPT | Performed by: PHYSICIAN ASSISTANT

## 2024-04-11 PROCEDURE — G0177 OPPS/PHP; TRAIN & EDUC SERV: HCPCS

## 2024-04-11 PROCEDURE — A9270 NON-COVERED ITEM OR SERVICE: HCPCS | Performed by: NURSE PRACTITIONER

## 2024-04-11 PROCEDURE — 250N000013 HC RX MED GY IP 250 OP 250 PS 637: Performed by: EMERGENCY MEDICINE

## 2024-04-11 PROCEDURE — 73120 X-RAY EXAM OF HAND: CPT | Mod: RT

## 2024-04-11 RX ORDER — LIDOCAINE 4 G/G
2 PATCH TOPICAL
Status: DISCONTINUED | OUTPATIENT
Start: 2024-04-12 | End: 2024-04-12 | Stop reason: HOSPADM

## 2024-04-11 RX ADMIN — POLYETHYLENE GLYCOL 3350 17 G: 17 POWDER, FOR SOLUTION ORAL at 08:15

## 2024-04-11 RX ADMIN — NICOTINE POLACRILEX 4 MG: 4 GUM, CHEWING BUCCAL at 09:37

## 2024-04-11 RX ADMIN — ACETAMINOPHEN 650 MG: 325 TABLET, FILM COATED ORAL at 19:23

## 2024-04-11 RX ADMIN — CLINDAMYCIN PHOSPHATE: 10 LOTION TOPICAL at 08:16

## 2024-04-11 RX ADMIN — QUETIAPINE FUMARATE 200 MG: 200 TABLET ORAL at 19:23

## 2024-04-11 RX ADMIN — NICOTINE POLACRILEX 4 MG: 4 GUM, CHEWING BUCCAL at 19:27

## 2024-04-11 RX ADMIN — CLONAZEPAM 0.25 MG: 0.5 TABLET ORAL at 11:46

## 2024-04-11 RX ADMIN — PAROXETINE HYDROCHLORIDE 10 MG: 10 TABLET, FILM COATED ORAL at 08:13

## 2024-04-11 RX ADMIN — Medication 5 MG: at 19:23

## 2024-04-11 RX ADMIN — QUETIAPINE FUMARATE 50 MG: 50 TABLET ORAL at 19:23

## 2024-04-11 RX ADMIN — NICOTINE POLACRILEX 4 MG: 4 GUM, CHEWING BUCCAL at 14:55

## 2024-04-11 RX ADMIN — NICOTINE POLACRILEX 4 MG: 4 GUM, CHEWING BUCCAL at 18:25

## 2024-04-11 RX ADMIN — DOCUSATE SODIUM 100 MG: 50 CAPSULE, LIQUID FILLED ORAL at 10:37

## 2024-04-11 RX ADMIN — NICOTINE POLACRILEX 4 MG: 4 GUM, CHEWING BUCCAL at 10:37

## 2024-04-11 RX ADMIN — CLONAZEPAM 0.25 MG: 0.5 TABLET ORAL at 08:14

## 2024-04-11 RX ADMIN — CLINDAMYCIN PHOSPHATE: 10 LOTION TOPICAL at 19:34

## 2024-04-11 RX ADMIN — ROSUVASTATIN 40 MG: 20 TABLET, FILM COATED ORAL at 08:12

## 2024-04-11 RX ADMIN — INSULIN GLARGINE 25 UNITS: 100 INJECTION, SOLUTION SUBCUTANEOUS at 08:15

## 2024-04-11 RX ADMIN — TESTOSTERONE 50 MG OF TESTOSTERONE: 50 GEL TRANSDERMAL at 08:13

## 2024-04-11 RX ADMIN — FLUPHENAZINE HYDROCHLORIDE 2 MG: 1 TABLET, FILM COATED ORAL at 08:12

## 2024-04-11 RX ADMIN — BACITRACIN: 500 OINTMENT TOPICAL at 08:17

## 2024-04-11 RX ADMIN — QUETIAPINE FUMARATE 50 MG: 50 TABLET ORAL at 08:13

## 2024-04-11 RX ADMIN — FLUPHENAZINE HYDROCHLORIDE 2 MG: 1 TABLET, FILM COATED ORAL at 19:22

## 2024-04-11 RX ADMIN — NICOTINE POLACRILEX 4 MG: 4 GUM, CHEWING BUCCAL at 16:39

## 2024-04-11 RX ADMIN — LITHIUM CARBONATE 900 MG: 450 TABLET, EXTENDED RELEASE ORAL at 19:22

## 2024-04-11 RX ADMIN — BENZOYL PEROXIDE: 50 LOTION TOPICAL at 08:16

## 2024-04-11 RX ADMIN — DEUTETRABENAZINE 6 MG: 6 TABLET, COATED ORAL at 08:16

## 2024-04-11 RX ADMIN — Medication 500 MG: at 08:12

## 2024-04-11 RX ADMIN — Medication 1 G: at 08:13

## 2024-04-11 RX ADMIN — NICOTINE POLACRILEX 4 MG: 4 GUM, CHEWING BUCCAL at 08:22

## 2024-04-11 RX ADMIN — ARIPIPRAZOLE 5 MG: 5 TABLET ORAL at 08:13

## 2024-04-11 RX ADMIN — ZINC SULFATE 220 MG (50 MG) CAPSULE 220 MG: CAPSULE at 08:13

## 2024-04-11 RX ADMIN — NICOTINE 1 PATCH: 21 PATCH, EXTENDED RELEASE TRANSDERMAL at 08:14

## 2024-04-11 RX ADMIN — NICOTINE POLACRILEX 4 MG: 4 GUM, CHEWING BUCCAL at 11:47

## 2024-04-11 RX ADMIN — DOXYCYCLINE HYCLATE 100 MG: 100 CAPSULE ORAL at 19:22

## 2024-04-11 RX ADMIN — DEXTROAMPHETAMINE SULFATE, DEXTROAMPHETAMINE SACCHARATE, AMPHETAMINE SULFATE AND AMPHETAMINE ASPARTATE 15 MG: 3.75; 3.75; 3.75; 3.75 CAPSULE, EXTENDED RELEASE ORAL at 08:13

## 2024-04-11 RX ADMIN — TRAZODONE HYDROCHLORIDE 50 MG: 50 TABLET ORAL at 19:23

## 2024-04-11 RX ADMIN — ACETAMINOPHEN 650 MG: 325 TABLET, FILM COATED ORAL at 13:05

## 2024-04-11 RX ADMIN — EMPAGLIFLOZIN 10 MG: 10 TABLET, FILM COATED ORAL at 08:13

## 2024-04-11 RX ADMIN — CLONAZEPAM 0.25 MG: 0.5 TABLET ORAL at 19:23

## 2024-04-11 RX ADMIN — CLONAZEPAM 0.25 MG: 0.5 TABLET ORAL at 14:53

## 2024-04-11 RX ADMIN — NICOTINE POLACRILEX 4 MG: 4 GUM, CHEWING BUCCAL at 13:01

## 2024-04-11 RX ADMIN — AMLODIPINE BESYLATE 10 MG: 10 TABLET ORAL at 08:15

## 2024-04-11 RX ADMIN — DOXYCYCLINE HYCLATE 100 MG: 100 CAPSULE ORAL at 08:13

## 2024-04-11 ASSESSMENT — ACTIVITIES OF DAILY LIVING (ADL)
ADLS_ACUITY_SCORE: 55
ORAL_HYGIENE: INDEPENDENT
LAUNDRY: WITH SUPERVISION
HYGIENE/GROOMING: INDEPENDENT
ADLS_ACUITY_SCORE: 55
HYGIENE/GROOMING: INDEPENDENT
ADLS_ACUITY_SCORE: 55
ADLS_ACUITY_SCORE: 55
DRESS: INDEPENDENT
ADLS_ACUITY_SCORE: 55

## 2024-04-11 NOTE — PLAN OF CARE
"Team Note Due:  Friday    Assessment/Intervention/Current Symtoms and Care Coordination:  Chart review, discussed pt progress, symptomology, and response to treatment.  Discussed the discharge plan and any potential impediments to discharge.    Tasks Completed:  - CTC heard from pt's RN that he had asked previously for 1:1 staffing at his group home. CTC met with pt to check-in and discussed discharge tomorrow. CTC reminded pt regarding care coordination meeting with his  today. CTC discussed 1:1 staffing at pt's group home and pt said to CTC \"I never asked for that\". CTC shared that if pt wanted to consider this in the future, he would need to discuss with his CADI  as they would be the one to assess need for that level of support and secure funding. Pt said \"I don't need that\". Pt asked if he would meet with the provider today and CTC confirmed that pt would likely meet with the provider today and possibly tomorrow prior to discharge. Pt had no other questions.   - [facility] received call from [facility] to reschedule one of pt's follow-up appointments. AVS updated.  - Called and spoke to pt's group home director, Domenica. Shared discharge plan for tomorrow and Domenica shared pt is able to return tomorrow at any time. Domenica requested that pt's medications be sent to [facility] pharmacy located at [address] [place] [address]. CTC shared that it looked like pt was on a restricted program and needs medications sent to [facility]. Domenica shared that they have been getting medications filled with [facility] for over a year. Domenica expressed concern that if medications are sent to [facility], they go to pt directly and the group home does not have access to them to support with medication administration. CTC will look into pharmacy restrictions and call Domenica back.   - [facility] placed request with care coordinators to schedule transportation. Transportation scheduled for tomorrow at 11 AM.  - CTC " "confirmed pt is no longer restricted. [facility] provided pharmacy information to provider to send medications.   - CTC left message for Domenica confirming medications will be sent to [facility] pharmacy and sharing that transportation is scheduled to pick pt up from hospital at 11 AM tomorrow.  - Pt had self-harm incident in which he punched his door and hurt his hand at approximately 3 PM. [facility] cancelled care conference with pt's  at 3:30 PM given pt's recent self-harm incident and harm to his hand.   - [facility] met with pt to explain care coordination meeting was cancelled. [facility] encouraged pt to practice self-care and coping skills this evening prior to discharge tomorrow. CTC went over provisional discharge agreement with pt and he signed.  - Sent signed provisional discharge agreement to pt's  for signature.    Discharge Plan or Goal:  Pt will discharge tomorrow at 11 AM. Pt will return to group home and attend DBT program.      Barriers to Discharge:  Pt reports improved symptoms.       Referral Status:    IRTS Referrals:  [facility] Intake - Submitted 4/3/2024  Contact: Kait Noonan,   Direct dial: [phone]/fax [phone]  Direct email: [email or web address]  General Email: [email or web address]    [facility] - Submitted 4/3/2024  [facility]: [phone]  Fax: [phone]   4/4/2024: Declined due to \"not taking any additional insulin dependent clients at this time\"  [facility]: [phone]  Fax: [phone]    DBT Programs:  [facility]  Address: [address], [place] [address]  Phone: [phone]  4/8/2024: Pt added to waitlist. [facility] provided writer's phone number to schedule intake.  4/9/2024: Intake scheduled - AVS updated.    [facility]  Address: [address], [address], [place] [address]  Phone: [phone]  Fax: [phone]  4/8/2024: Left message " to inquire about referral process for DBT program and current openings.    [facility]  Address: [address] [place] [address]  Phone: [phone]  4/8/2024: Left message to inquire about referral process for DBT program and current openings. Received return voicemail noting this is not an intensive program and they would only offer 1x/weekly DBT group. They do not accept anyone with significant SIB - not appropriate referral.    [facility]  Address: [address] [place] [address] [address], [place] [address]  Phone: [phone]  Fax: [phone]  4/8/2024: Left message for Jeniffer to submit referral for DBT program.     Legal Status:  Revoked    County: [place]  File Number: [id number]    Contacts:  Psychiatrist/Medication Management:  Name/Organization: Regnie Last, CNP, APRN [facility]  Phone: [phone]  Fax: [phone]    :  Name/Organization: Cristy Ji  [facility] - [place] signed  Phone: [phone]    Group Home:  Name/Organization: Domenica Group Home Director  [facility] - [facility] signed  Phone: [phone]    [facility]:  Name/Organization: [facility] - [place] signed  Phone: [phone]     CADI Worker:  Name/Organization: Lydia Britt  [facility]  Phone: [phone]     Upcoming Meetings and Dates/Important Information and next steps:  - Sent provisional discharge agreement and change of status to [facility].

## 2024-04-11 NOTE — PROGRESS NOTES
Brief Medicine Progress Note  April 11, 2024     Received page from nursing staff @ 8250 that pt had punched a door and needed to be assessed.     IM consult placed @ 1541 as Routine. Spoke w/ nurse on the phone who stated the patient's hand appeared disfigured and they were concerned about fx. No glass borken or obvious fragments or foreign bodies. I will order an XR of the R hand for assessment. As I was not present at the hospital physically, I reached out to one of my on call colleagues who has graciously agreed to assess the patient's hand this afternoon/evening.    Plan:  - XR R hand ordered  - Pt hand will be evaluated by OFELIA colleague      William Romano PA-C  [facility] Hospitalist Service  Securely message with [employer] (more info)  Text page via [employer] Paging/Directory

## 2024-04-11 NOTE — PLAN OF CARE
"Problem: Suicide Risk  Goal: Absence of Self-Harm  Outcome: Progressing     Problem: Anxiety Signs/Symptoms  Goal: Enhanced Social, Occupational or Functional Skills (Anxiety Signs/Symptoms)  Outcome: Not Progressing    Patient is calm and cooperative on approach. Presents with flat affect, but brightens some on approach. Withdrawn to room much of the evening. Did not attend group, but was out at meal time. Patient ate 100% of dinner. BG was 122. Patient endorsed anxiety 5/10 initially, and denied feeling depressed this shift. Patient reports having ongoing auditory hallucinations, but denies that they are command in nature. Patient denied any thoughts of harm to self or others at this time.   After dinner patient reported anxiety increased to 8/10 and requested PRN klonopin. Tylenol also given for headache 5/10. Patient continued to deny any thoughts of harm to self or others. No behaviors noted. Patient continues on SIO at 10 feet for safety.   Patient is medication compliant. PRN colace, melatonin, and trazodone requested and given at bedtime.     BP (!) 156/85 (BP Location: Left arm, Patient Position: Sitting, Cuff Size: Adult Regular)   Pulse 90   Temp 97.4  F (36.3  C) (Temporal)   Resp 16   Ht 1.676 m (5' 6\")   Wt 102.8 kg (226 lb 9.6 oz)   SpO2 96%   BMI 36.57 kg/m         "

## 2024-04-11 NOTE — PLAN OF CARE
"   04/11/24 1249   Group Therapy Session   Group Attendance attended group session   Time Session Began 1115   Time Session Ended 1200   Total Time (minutes) 45   Total # Attendees 4   Group Type life skill;other (see comments)  (OT)   Group Topic Covered balanced lifestyle;coping skills/lifestyle management;other (see comments)  (sensorimotor)   Group Session Detail OT - Coping: Group focus was learning and practice of use of a variety of sensorimotor techniques for calming and self regulation. Emphasis was on activation of the proprioceptive and vestibular senses.    Patient Response/Contribution able to recall/repeat info presented;cooperative with task;discussed personal experience with topic;expressed understanding of topic   Patient Participation Detail Pt easily engaged in the practice, noting limited experience with this type of movement. Shared about some personal physical challenges, yet did try all of the movements. Utilized humor in appropriate manner and would often note \"this is the mood I am in today\".                             "

## 2024-04-11 NOTE — PLAN OF CARE
Care Coordinator Note(s):    Care Request(s):   Transportation   Preferences: 24  11:00 AM  Notes: Drop off [address] [place] [id number]         Care Outcome(s):      Discharge Transportation    Insurance: [employer]     Phone: [phone]                Transportation company: [employer]   Phone: [phone]    Confirmation #: [id number]      Address: [facility]: [address] [place] [address]  Date/ Time: 24 @ 11:00 AM  Call on arrival: [address] #: [phone]     Drop off  Address:  [address] [place] [id number]       -Lucie Fagan  Adult Behavioral Health Care Coordinator
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
## 2024-04-11 NOTE — PLAN OF CARE
Care Coordinator Note(s):    Care Request(s):   Psychiatry   Preferences: Time Frame: 1 Week, PM, Any Appointment Type (In Person, Virtual, Telephone),   Notes: Name/Organization: Regine Last CNP, APRN [facility]      Care Outcome(s):      Appointment: Psychiatry   Date/time: Thursday April 25th, 2024 @ 1:45 PM  Virtual  Provider:  Regine Last APRN CNP  Address: [place] [address] [address] [place] [id number]  Phone: [phone]    Lucie Fagan  Adult Behavioral Health Care Coordinator

## 2024-04-11 NOTE — PLAN OF CARE
BEH IP Unit Acuity Rating Score (UARS)  Patient is given one point for every criteria they meet.    CRITERIA SCORING   On a 72 hour hold, court hold, committed, stay of commitment, or revocation. 1/1    Patient LOS on BEH unit exceeds 20 days. 0/1  LOS: 15   Patient under guardianship, 55+, otherwise medically complex, or under age 11. 0/1   Suicide ideation without relief of precipitating factors. 1/1   Current plan for suicide. 0/1   Current plan for homicide. 0/1   Imminent risk or actual attempt to seriously harm another without relief of factors precipitating the attempt. 0/1   Severe dysfunction in daily living (ex: complete neglect for self care, extreme disruption in vegetative function, extreme deterioration in social interactions). 0/1   Recent (last 7 days) or current physical aggression in the ED or on unit. 0/1   Restraints or seclusion episode in past 72 hours. 0/1   Recent (last 7 days) or current verbal aggression, agitation, yelling, etc., while in the ED or unit. 0/1   Active psychosis. 0/1   Need for constant or near constant redirection (from leaving, from others, etc).  0/1   Intrusive or disruptive behaviors. 0/1   Patient requires 3 or more hours of individualized nursing care per 8-hour shift (i.e. for ADLs, meds, therapeutic interventions). 1/1   TOTAL 3

## 2024-04-11 NOTE — PLAN OF CARE
Problem: Adult Behavioral Health Plan of Care  Goal: Optimized Coping Skills in Response to Life Stressors  Outcome: Progressing   Goal Outcome Evaluation:         Anticipated discharge tomorrow back to his group home.    Pt. Visible in the lounge engaged with peers and staff, presents with a bright affect, pt is looking forward and preparing for his upcoming discharge. Attend and participated in groups, including pet therapy. Reports feeling constipated PRN Colace effective    Pt. Endorses low anxiety, denies depression, denies pain, endorses command auditory hallucinations to harm self and others, pt reports groups and socializing as helpful distractions, denies SI/HI/SIB, medication compliance, frequent request for nicotine lozenges and contracted for safety.      Late afternoon Pt. Rating 8/10 lower back pain, PRN Tylenol and hot pack ineffective, Dr. Ventura denied pt. Request for a  x1 dose of Oxycodone, pt. Became agitated threw a glass of water and punched the wall, right hand red and swollen. PRN Klonopin administered. Ice pack placed on right hand,  provider updated, internal medicine consult placed.             AM BS - 131
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
## 2024-04-12 ENCOUNTER — TELEPHONE (OUTPATIENT)
Dept: PSYCHIATRY | Facility: CLINIC | Age: 34
End: 2024-04-12
Payer: MEDICARE

## 2024-04-12 VITALS
HEIGHT: 66 IN | SYSTOLIC BLOOD PRESSURE: 126 MMHG | RESPIRATION RATE: 16 BRPM | HEART RATE: 87 BPM | TEMPERATURE: 97.5 F | BODY MASS INDEX: 36.32 KG/M2 | WEIGHT: 226 LBS | OXYGEN SATURATION: 98 % | DIASTOLIC BLOOD PRESSURE: 88 MMHG

## 2024-04-12 DIAGNOSIS — Z79.899 ENCOUNTER FOR LONG-TERM (CURRENT) USE OF MEDICATIONS: Primary | ICD-10-CM

## 2024-04-12 LAB — GLUCOSE BLDC GLUCOMTR-MCNC: 144 MG/DL (ref 70–99)

## 2024-04-12 PROCEDURE — 99239 HOSP IP/OBS DSCHRG MGMT >30: CPT | Performed by: NURSE PRACTITIONER

## 2024-04-12 PROCEDURE — 250N000013 HC RX MED GY IP 250 OP 250 PS 637: Performed by: PSYCHIATRY & NEUROLOGY

## 2024-04-12 PROCEDURE — 250N000013 HC RX MED GY IP 250 OP 250 PS 637

## 2024-04-12 PROCEDURE — 250N000013 HC RX MED GY IP 250 OP 250 PS 637: Performed by: NURSE PRACTITIONER

## 2024-04-12 PROCEDURE — A9270 NON-COVERED ITEM OR SERVICE: HCPCS | Performed by: NURSE PRACTITIONER

## 2024-04-12 PROCEDURE — 99232 SBSQ HOSP IP/OBS MODERATE 35: CPT

## 2024-04-12 PROCEDURE — 250N000013 HC RX MED GY IP 250 OP 250 PS 637: Performed by: STUDENT IN AN ORGANIZED HEALTH CARE EDUCATION/TRAINING PROGRAM

## 2024-04-12 RX ORDER — QUETIAPINE FUMARATE 25 MG/1
25 TABLET, FILM COATED ORAL EVERY 6 HOURS PRN
Qty: 90 TABLET | Refills: 0 | Status: SHIPPED | OUTPATIENT
Start: 2024-04-12 | End: 2024-08-07

## 2024-04-12 RX ORDER — MAGNESIUM GLUCONATE 27 MG(500)
500 TABLET ORAL DAILY
Qty: 30 TABLET | Refills: 0 | Status: SHIPPED | OUTPATIENT
Start: 2024-04-13 | End: 2024-05-13

## 2024-04-12 RX ORDER — CHLORAL HYDRATE 500 MG
1 CAPSULE ORAL DAILY
Qty: 30 CAPSULE | Refills: 0 | Status: SHIPPED | OUTPATIENT
Start: 2024-04-13 | End: 2024-05-13

## 2024-04-12 RX ORDER — CLONAZEPAM 0.5 MG/1
0.25 TABLET ORAL 2 TIMES DAILY PRN
Qty: 30 TABLET | Refills: 0 | Status: SHIPPED | OUTPATIENT
Start: 2024-04-12 | End: 2024-04-13

## 2024-04-12 RX ORDER — ONDANSETRON 4 MG/1
4 TABLET, FILM COATED ORAL EVERY 6 HOURS PRN
Qty: 15 TABLET | Refills: 0 | Status: SHIPPED | OUTPATIENT
Start: 2024-04-12 | End: 2024-05-12

## 2024-04-12 RX ORDER — CLONAZEPAM 0.5 MG/1
0.25 TABLET ORAL 2 TIMES DAILY
Qty: 30 TABLET | Refills: 0 | Status: SHIPPED | OUTPATIENT
Start: 2024-04-12 | End: 2024-04-13

## 2024-04-12 RX ORDER — LITHIUM CARBONATE 450 MG
900 TABLET, EXTENDED RELEASE ORAL AT BEDTIME
Qty: 60 TABLET | Refills: 0 | Status: SHIPPED | OUTPATIENT
Start: 2024-04-12 | End: 2024-04-23

## 2024-04-12 RX ORDER — ARIPIPRAZOLE 5 MG/1
5 TABLET ORAL EVERY MORNING
Qty: 30 TABLET | Refills: 0 | Status: SHIPPED | OUTPATIENT
Start: 2024-04-12 | End: 2024-04-23

## 2024-04-12 RX ORDER — DOCUSATE SODIUM 100 MG/1
100 CAPSULE, LIQUID FILLED ORAL 2 TIMES DAILY PRN
Qty: 30 CAPSULE | Refills: 0 | Status: SHIPPED | OUTPATIENT
Start: 2024-04-12 | End: 2024-04-18

## 2024-04-12 RX ORDER — FLUPHENAZINE HYDROCHLORIDE 1 MG/1
2 TABLET ORAL 2 TIMES DAILY
Qty: 120 TABLET | Refills: 0 | Status: SHIPPED | OUTPATIENT
Start: 2024-04-12 | End: 2024-04-23

## 2024-04-12 RX ORDER — AMOXICILLIN 250 MG
1 CAPSULE ORAL 2 TIMES DAILY PRN
Qty: 30 TABLET | Refills: 0 | Status: SHIPPED | OUTPATIENT
Start: 2024-04-12 | End: 2024-04-18

## 2024-04-12 RX ORDER — FLUPHENAZINE HYDROCHLORIDE 5 MG/1
5 TABLET ORAL 2 TIMES DAILY PRN
Qty: 60 TABLET | Refills: 0 | Status: SHIPPED | OUTPATIENT
Start: 2024-04-12 | End: 2024-04-23

## 2024-04-12 RX ORDER — OLANZAPINE 10 MG/1
10 TABLET, ORALLY DISINTEGRATING ORAL 3 TIMES DAILY PRN
Qty: 20 TABLET | Refills: 0 | Status: SHIPPED | OUTPATIENT
Start: 2024-04-12 | End: 2024-04-23

## 2024-04-12 RX ORDER — LIDOCAINE 4 G/G
2 PATCH TOPICAL EVERY 24 HOURS
Qty: 60 PATCH | Refills: 0 | Status: SHIPPED | OUTPATIENT
Start: 2024-04-12 | End: 2024-05-12

## 2024-04-12 RX ORDER — HYDROXYZINE HYDROCHLORIDE 25 MG/1
25 TABLET, FILM COATED ORAL EVERY 4 HOURS PRN
Qty: 30 TABLET | Refills: 0 | Status: SHIPPED | OUTPATIENT
Start: 2024-04-12 | End: 2024-04-23

## 2024-04-12 RX ORDER — QUETIAPINE FUMARATE 50 MG/1
50 TABLET, FILM COATED ORAL 2 TIMES DAILY
Qty: 60 TABLET | Refills: 0 | Status: SHIPPED | OUTPATIENT
Start: 2024-04-12 | End: 2024-04-16

## 2024-04-12 RX ORDER — PAROXETINE 10 MG/1
10 TABLET, FILM COATED ORAL DAILY
Qty: 30 TABLET | Refills: 0 | Status: SHIPPED | OUTPATIENT
Start: 2024-04-12 | End: 2024-04-23

## 2024-04-12 RX ORDER — POLYETHYLENE GLYCOL 3350 17 G/17G
17 POWDER, FOR SOLUTION ORAL DAILY
Qty: 510 G | Refills: 0 | Status: SHIPPED | OUTPATIENT
Start: 2024-04-12 | End: 2024-04-22

## 2024-04-12 RX ORDER — GINSENG 100 MG
CAPSULE ORAL 2 TIMES DAILY
Qty: 14 G | Refills: 0 | Status: SHIPPED | OUTPATIENT
Start: 2024-04-12 | End: 2024-04-18

## 2024-04-12 RX ORDER — TRAZODONE HYDROCHLORIDE 50 MG/1
50 TABLET, FILM COATED ORAL
Qty: 30 TABLET | Refills: 0 | Status: SHIPPED | OUTPATIENT
Start: 2024-04-12 | End: 2024-04-18

## 2024-04-12 RX ORDER — FLUORIDE TOOTHPASTE
5-10 TOOTHPASTE DENTAL 4 TIMES DAILY PRN
Qty: 59 ML | Refills: 0 | Status: SHIPPED | OUTPATIENT
Start: 2024-04-12 | End: 2024-05-12

## 2024-04-12 RX ORDER — ORAL SEMAGLUTIDE 7 MG/1
7 TABLET ORAL DAILY
Qty: 90 TABLET | Refills: 1 | Status: SHIPPED | OUTPATIENT
Start: 2024-04-13 | End: 2024-05-15

## 2024-04-12 RX ADMIN — AMLODIPINE BESYLATE 10 MG: 10 TABLET ORAL at 08:11

## 2024-04-12 RX ADMIN — TESTOSTERONE 50 MG OF TESTOSTERONE: 50 GEL TRANSDERMAL at 08:12

## 2024-04-12 RX ADMIN — NICOTINE 1 PATCH: 21 PATCH, EXTENDED RELEASE TRANSDERMAL at 08:12

## 2024-04-12 RX ADMIN — LIDOCAINE 2 PATCH: 4 PATCH TOPICAL at 08:13

## 2024-04-12 RX ADMIN — ROSUVASTATIN 40 MG: 20 TABLET, FILM COATED ORAL at 08:08

## 2024-04-12 RX ADMIN — DEUTETRABENAZINE 6 MG: 6 TABLET, COATED ORAL at 08:14

## 2024-04-12 RX ADMIN — NICOTINE POLACRILEX 4 MG: 4 GUM, CHEWING BUCCAL at 09:52

## 2024-04-12 RX ADMIN — CLONAZEPAM 0.25 MG: 0.5 TABLET ORAL at 08:07

## 2024-04-12 RX ADMIN — ARIPIPRAZOLE 5 MG: 5 TABLET ORAL at 08:08

## 2024-04-12 RX ADMIN — QUETIAPINE FUMARATE 50 MG: 50 TABLET ORAL at 08:07

## 2024-04-12 RX ADMIN — POLYETHYLENE GLYCOL 3350 17 G: 17 POWDER, FOR SOLUTION ORAL at 08:12

## 2024-04-12 RX ADMIN — DOXYCYCLINE HYCLATE 100 MG: 100 CAPSULE ORAL at 08:08

## 2024-04-12 RX ADMIN — INSULIN GLARGINE 25 UNITS: 100 INJECTION, SOLUTION SUBCUTANEOUS at 08:17

## 2024-04-12 RX ADMIN — PAROXETINE HYDROCHLORIDE 10 MG: 10 TABLET, FILM COATED ORAL at 08:11

## 2024-04-12 RX ADMIN — DEXTROAMPHETAMINE SULFATE, DEXTROAMPHETAMINE SACCHARATE, AMPHETAMINE SULFATE AND AMPHETAMINE ASPARTATE 15 MG: 3.75; 3.75; 3.75; 3.75 CAPSULE, EXTENDED RELEASE ORAL at 08:07

## 2024-04-12 RX ADMIN — FLUPHENAZINE HYDROCHLORIDE 2 MG: 1 TABLET, FILM COATED ORAL at 08:07

## 2024-04-12 RX ADMIN — Medication 500 MG: at 08:10

## 2024-04-12 RX ADMIN — NICOTINE POLACRILEX 4 MG: 4 GUM, CHEWING BUCCAL at 08:12

## 2024-04-12 RX ADMIN — ZINC SULFATE 220 MG (50 MG) CAPSULE 220 MG: CAPSULE at 08:10

## 2024-04-12 RX ADMIN — Medication 1 G: at 08:12

## 2024-04-12 RX ADMIN — EMPAGLIFLOZIN 10 MG: 10 TABLET, FILM COATED ORAL at 08:07

## 2024-04-12 ASSESSMENT — ACTIVITIES OF DAILY LIVING (ADL)
ADLS_ACUITY_SCORE: 55
ADLS_ACUITY_SCORE: 55
LAUNDRY: WITH SUPERVISION
ADLS_ACUITY_SCORE: 55
HYGIENE/GROOMING: INDEPENDENT
ADLS_ACUITY_SCORE: 55
DRESS: INDEPENDENT
ADLS_ACUITY_SCORE: 55
ORAL_HYGIENE: INDEPENDENT
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
## 2024-04-12 NOTE — PLAN OF CARE
BEH IP Unit Acuity Rating Score (UARS)  Patient is given one point for every criteria they meet.    CRITERIA SCORING   On a 72 hour hold, court hold, committed, stay of commitment, or revocation. 1/1    Patient LOS on BEH unit exceeds 20 days. 0/1  LOS: 16   Patient under guardianship, 55+, otherwise medically complex, or under age 11. 0/1   Suicide ideation without relief of precipitating factors. 1/1   Current plan for suicide. 0/1   Current plan for homicide. 0/1   Imminent risk or actual attempt to seriously harm another without relief of factors precipitating the attempt. 0/1   Severe dysfunction in daily living (ex: complete neglect for self care, extreme disruption in vegetative function, extreme deterioration in social interactions). 0/1   Recent (last 7 days) or current physical aggression in the ED or on unit. 0/1   Restraints or seclusion episode in past 72 hours. 0/1   Recent (last 7 days) or current verbal aggression, agitation, yelling, etc., while in the ED or unit. 0/1   Active psychosis. 0/1   Need for constant or near constant redirection (from leaving, from others, etc).  0/1   Intrusive or disruptive behaviors. 0/1   Patient requires 3 or more hours of individualized nursing care per 8-hour shift (i.e. for ADLs, meds, therapeutic interventions). 1/1   TOTAL 3

## 2024-04-12 NOTE — PLAN OF CARE
"Problem: Adult Inpatient Plan of Care  Goal: Plan of Care Review  Outcome: Progressing     Patient continues on SIO at 10 feet. Mood and behavior has been calm and controlled through out the evening. No attempts of harm towards self or others this shift.   Xray of right hand completed after patient punched door this morning out of frustration. No fractures or dislocation noted. Patient reports pain 5/10 and has been using PRN tylenol and cold packs. Redness and swelling has gone down. Some bruising still noted. Skin is otherwise intact.   Patient endorses some anxiety and depression, but reports that mood is improved this shift. Patient denies command hallucinations or any SI/SIB/HI at this time and contracts for safety.   Patient has been observed in lounge watching TV or working on puzzle with SIO staff. Appears to avoid social contact much of the time otherwise. Declined afternoon group. Patient was out at dinner time. Appetite is good. Patient is medication compliant. PRN tylenol given x 1 for hand. Patient also requested and given PRN klonopin, melatonin and trazodone before bedtime.   Patient scheduled for discharge to group home tomorrow. Patient states he is ready, and denies questions or concerns at this time.    /85 (BP Location: Left arm, Patient Position: Chair)   Pulse 98   Temp 98.1  F (36.7  C) (Oral)   Resp 16   Ht 1.676 m (5' 6\")   Wt 102.5 kg (226 lb)   SpO2 96%   BMI 36.48 kg/m      "

## 2024-04-12 NOTE — TELEPHONE ENCOUNTER
Patient discharged today and would like to be seen sooner as he is having a tremor in both hands for the last couple days. He state sthat he thought it was just from being anxious in the hospital but now thinks it may be from medication changes. He would like to see mattie and FLORA sooner. Appointment for mattie set for 4/16.     Compare Pt Discharge summary to that in EPIC: N/A in [facility]   Able to get meds filled: yes  Mood: good, feeling better   SE: tremor in both hands for last couple of days    DC Plan:   Admit Date: 3/25/24  Discharge date:4/12/24   Next appt: 4/16/24  Referrals (therapy, daytx, homecare, ect): None  Crisis Plan: reviewed   Contact Numbers Reviewed: YEs     TCM:  Direct contact made with pt within 2 business days? Yes  Pt is schedule in clinic within 14 days of discharge? Yes   Pt qualifies for TCM visit? Yes       RECENT SYMPTOMS:   PSYCHOSIS:  reports-auditory hallucinations;  DENIES- commands   Physical health: no change  Mood (include other cultures' views - heavy heart, heaviness, pain): description consistent with euthymia  Thought Process/Associations: unremarkable  Thought Content   Include ideas of reference, thought broadcasting/insertion, religiosity  Include specificity of target, credibility of threat):  Denies suicidal ideation, violent ideation, and paranoid ideation  Perception:  Reports auditory hallucinations;    Insight: adequate  Judgment: fair  Thoughts of non-suicidal self-harm: no  Has the patient acted on these? N/A
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
## 2024-04-12 NOTE — PLAN OF CARE
Team Note Due:  Friday    Assessment/Intervention/Current Symtoms and Care Coordination:  Chart review, discussed pt progress, symptomology, and response to treatment.  Discussed the discharge plan and any potential impediments to discharge.    Tasks Completed:  - Met with pt to discuss discharge. Pt reports feeling ready for discharge, reports he feels safe currently and believes he can remain safe outside of the hospital. Pt currently denies SI/SIB. Pt reports he would like a PCP appointment scheduled for his back and [facility] shared that [facility] will attempt to schedule appointment. However, if this cannot be completed prior to pt's discharge as pt is discharging in an hour, pt's group home can support him with this, as well. Pt reported understanding. Pt asked about CADI  and whether CTC heard back from her. [facility] confirmed that pt's CADI  did not call CTC back and encouraged pt to follow-up with her once he is home. [facility] reminded pt that his MA was renewed while in the hospital and is active again. Pt reported understanding. CTC discussed [facility] program with pt and he is reporting he would like to do both DBT and [facility] program. CTC shared that intake was not completed inpatient as pt had been reporting he did not want to do the [facility] program. [facility] encouraged pt to follow-up with his  to discuss completing intake for [facility] program if he continues to want to do both programs. [facility] shared that DBT appointments were scheduled for Tuesday specifically so that pt would be available for [facility] program Monday and Thursday, if he chose. Pt reports he has meeting with  scheduled for Monday and he will follow-up with her at that time.   - [facility] followed-up with Aminta at [facility] program to share that pt discharged today and intake will be coordinated by pt's .   - [facility] never received response from pt's  despite several email contacts. CTC did not receive provisional discharge signed  "by .   - [facility] submitted provisional discharge agreement signed by pt and change of status form to [facility].    Discharge Plan or Goal:  Pt discharged at 11 AM back to group home with outpatient supports.     Barriers to Discharge:  None     Referral Status:    IRTS Referrals:  [facility] - Submitted 4/3/2024  Contact: Kait Noonan   Direct dial: [phone]/fax [phone]  Direct email: [email or web address]  General Email: [email or web address]    [facility] - Submitted 4/3/2024  [place]: [phone]  Fax: [phone]   4/4/2024: Declined due to \"not taking any additional insulin dependent clients at this time\"  [facility]: [phone]  Fax: [phone]    DBT Programs:  [facility]  Address: [address] [facility] [address], [place] [address]  Phone: [phone]  4/8/2024: Pt added to waitlist. [facility] provided writer's phone number to schedule intake.  4/9/2024: Intake scheduled - AVS updated.    [facility]  Address: [address], [facility] [address], [place] [address]  Phone: [phone]  Fax: [phone]  4/8/2024: Left message to inquire about referral process for DBT program and current openings.    [facility]  Address: [address] [place] [address]  Phone: [phone]  4/8/2024: Left message to inquire about referral process for DBT program and current openings. Received return voicemail noting this is not an intensive program and they would only offer 1x/weekly DBT group. They do not accept anyone with significant SIB - not appropriate referral.    [facility]  Address: [address] [place] [address] [address] [address]  Phone: [phone]  Fax: [phone]  4/8/2024: Left message for Jeniffer to submit referral for DBT program.     Legal Status:  Revoked PD   County: [place]  File Number: " [id number]    Contacts:  Psychiatrist/Medication Management:  Name/Organization: Regine Last, CNP, APRN [facility]  Phone: [phone]  Fax: [phone]    :  Name/Organization: Cristy Ji  [facility] - VAL signed  Phone: [phone]    Group Home:  Name/Organization: [facility] [place] Director  [facility] - VAL signed  Phone: [phone]    [facility]:  Name/Organization: Deena Ibarra - VAL signed  Phone: [phone]     CADI Worker:  Name/Organization: Lydia Britt  [facility]  Phone: [phone]     Upcoming Meetings and Dates/Important Information and next steps:  None
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
## 2024-04-12 NOTE — PLAN OF CARE
Discharge orders are received.  Writer reviewed and discussed discharge instructions, follow-up care, future appointments, and medication administration with pt. Pt acknowledges an understanding.  Patient continues to deny SI/SIB/HI/AH/VH. Pt contracts for safety within the community. Pt verbalized understanding of discharge instructions. Pt received personal belongings.  All forms are signed. Pt denies any questions or concerns at this time. Patient to discharge to group home. Pt discharged around 1100 via taxi. Staff escorted pt out of the building.

## 2024-04-12 NOTE — CONSULTS
"[facility]  Consult Note - Hospitalist Service  Date of Admission:  3/25/2024  Consult Requested by: BROOKLYN Lugo, CNP  Reason for Consult: \"hx of cauda equina, states exacerbation, pain shooting down L leg, punched door please assess R hand\"    Assessment & Plan   Prakash Prasad is a 33 year old transgender man (he/him/his pronouns) admitted on 3/25/2024. He has a past medical history of diabetes mellitus type 2, bipolar disorder type I, depression, PTSD, borderline personality disorder, ADHD, GERD, hypertension, hyperlipidemia, urinary retention, chronic low back pain, history of TBI, history of polysubstance use disorder, who presented for evaluation of hallucinations. He was managed on station [facility]. Medicine involved throughout stay for multiple issues. Reconsulted today for LLE pain and R hand pain.    LLE Pain, probably 2/2 Sciatica  Chronic Lower Back Pain  Hx of Sciatica  Hx of Cauda Equina   Per chart review, pt w/ hx of the above issues. Medicine reconsulted to address these issues, but discharging in one hour. Chart review shows hx of cauda equina in 2016 w/ neurogenic bladder which resolved following back surgery. Reassuringly, pt describes sx as being c/w known LLE sciatica ISO chronic lumbago for which he was on Gabapentin in the past, but self-discontinued in January as it was not working. Denies bladder/bowel incontinence, saddle anesthesia, acute numbness/tingling in extremities. Exam non-focal. Suspect sx are 2/2 sciatica.   - Discussed mgmt of this as an OP which includes follow-up w/ PCP and mgmt of sx w/ OTC meds such as Tylenol, Ibuprofen  - D/w Primary Team who will place referral to OP PT for further mgmt     R Hand Pain  Traumatic Injury to R Hand  Medicine consulted this AM following an outburst pt had yesterday whereby he punched a door w/ his R hand. No broken glass or fragmented objects sustained w/ punch. XR taken which showed no " "acute bony abnormalities. No reported snuffbox pain. Pain overlying volar & palmar aspects of proximal and middle phalanges of 3rd digit on R hand, as well as volar aspect of PIP joint of 3rd digit on R hand. No issues w/ ROM, and neurovascularly intact.  - Instructed pt at discharge use Tylenol, Ibuprofen, and topical agents  - Follow-up w/ PCP     The patient's care was discussed with the Bedside Nurse, Patient, and Primary team.    Medicine will sign off. No further recommendations at this time.  Please feel free to reconsult if any new medical issues or concerns.  Thank you for the opportunity to care for this patient.     Clinically Significant Risk Factors                  # Hypertension: Noted on problem list       # DMII: A1C = 7.8 % (Ref range: 0.0 - 5.6 %) within past 6 months   # Obesity: Estimated body mass index is 36.48 kg/m  as calculated from the following:    Height as of this encounter: 1.676 m (5' 6\").    Weight as of this encounter: 102.5 kg (226 lb).      # Financial/Environmental Concerns: unemployed         Pool Mclean PA-C  Hospitalist Service  Securely message with Vocera (more info)  Text page via [facility] Paging/Directory   ______________________________________________________________________    Chief Complaint   \"My leg has shooting pain and my R hand hurts\"    History is obtained from the patient    History of Present Illness   Prakash Prasad is a 33 year old adult who is seen in his room this morning.  Patient reports ongoing R hand pain, which came on following punching a door yesterday.  The patient denies any numbness or tingling in the hand, and points out that the pain is primarily in the R middle finger, and base of the fourth and fifth digits on the right hand.    In regards to his LLE, patient reports chronic lower back pain, and no acute changes in this.  Recently, he has begun to experience \"shooting\" pains down the LLE into the foot.  He has a history of cauda equina, but " denies any saddle anesthesia, bowel/bladder incontinence, numbness or tingling that is new in the BLEs.  He feels that this pain is similar to his prior bouts of sciatica.    Past Medical History    Past Medical History:   Diagnosis Date    ADHD (attention deficit hyperactivity disorder)     Bipolar 1 disorder     Borderline personality disorder     Cauda equina syndrome     Chronic low back pain     Depression     Diabetes mellitus, type 2 (H) 1/19/2023    GERD (gastroesophageal reflux disease)     h/o TBI (traumatic brain injury)     Hypertension, unspecified type 12/16/2021    Marginal corneal ulcer, left 07/17/2015    Nephrolithiasis     obesity     Polysubstance abuse - methamphetamine, hallucinagen, heroin, marijuana     currently in remission    PONV (postoperative nausea and vomiting)     PTSD (post-traumatic stress disorder)        Past Surgical History   Past Surgical History:   Procedure Laterality Date    BACK SURGERY  12/24/2016    BACK SURGERY - For Cauda Equina Syndrome  12/24/2016    COLONOSCOPY      COMBINED CYSTOSCOPY, INSERT STENT URETER(S) Left 8/30/2018    Procedure: COMBINED CYSTOSCOPY, INSERT STENT URETER(S);  Cystoscopy With Left Stent Placement;  Surgeon: Kiran Ulrich MD;  Location: [facility]    COMBINED CYSTOSCOPY, RETROGRADES, EXCHANGE STENT URETER(S) Left 10/14/2018    Procedure: COMBINED CYSTOSCOPY, RETROGRADES, EXCHANGE STENT URETER(S);  Cystoscopy and removal of left-sided stent.;  Surgeon: Stiven Olivo MD;  Location:  OR    COMBINED CYSTOSCOPY, RETROGRADES, URETEROSCOPY, INSERT STENT  1/6/2014    Procedure: COMBINED CYSTOSCOPY, RETROGRADES, URETEROSCOPY, INSERT STENT;  Cystyoscopy place left ureteral stent;  Surgeon: Jaun Kimble MD;  Location: [facility] OR    COMBINED CYSTOSCOPY, RETROGRADES, URETEROSCOPY, INSERT STENT Left 10/23/2018    Procedure: Video cystoscopy, left ureteroscopy with stone extraction;  Surgeon: Bull Mast MD;  Location:  OR     CYSTOSCOPY, URETEROSCOPY, COMBINED Right 9/23/2015    Procedure: COMBINED CYSTOSCOPY, URETEROSCOPY;  Surgeon: ROME Jett MD;  Location: [facility] OR    ENT SURGERY      ESOPHAGOSCOPY, GASTROSCOPY, DUODENOSCOPY (EGD), COMBINED  4/8/2013    Procedure: COMBINED ESOPHAGOSCOPY, GASTROSCOPY, DUODENOSCOPY (EGD), BIOPSY SINGLE OR MULTIPLE;  Gastroscopy;  Surgeon: Peter Pickard MD;  Location: [facility]    ESOPHAGOSCOPY, GASTROSCOPY, DUODENOSCOPY (EGD), COMBINED Left 10/28/2014    Procedure: COMBINED ESOPHAGOSCOPY, GASTROSCOPY, DUODENOSCOPY (EGD), BIOPSY SINGLE OR MULTIPLE;  Surgeon: Narcisa Ramirez MD;  [facility]    ESOPHAGOSCOPY, GASTROSCOPY, DUODENOSCOPY (EGD), COMBINED N/A 12/24/2018    Procedure: COMBINED ESOPHAGOSCOPY, GASTROSCOPY, DUODENOSCOPY (EGD), BIOPSY SINGLE OR MULTIPLE;  Surgeon: Sonu Verduzco MD;  Location: [facility]    INJECT EPIDURAL TRANSFORAMINAL LUMBAR / SACRAL EA ADDITIONAL LEVEL Left 3/18/2021    Procedure: Left L5/S1 transforaminal injection for selective L5 nerve root block;  Surgeon: Eliza Pagan MD;  [place] [facility] [place]    LAPAROSCOPIC CHOLECYSTECTOMY  11/20/2014    [facility], Dr. Ramirez    LASER HOLMIUM LITHOTRIPSY URETER(S), INSERT STENT, COMBINED  4/2/2014    Procedure: COMBINED CYSTOSCOPY, URETEROSCOPY, LASER HOLMIUM LITHOTRIPSY URETER(S), INSERT STENT;  Cystoscopy,Left Ureteral Stent Removal,Left Ureteroscopy with Laser Lithotripsy,Left Ureter Stent Placement;  Surgeon: ROME Jett MD;  Location: [facility] OR    Transurethral stone resection  03/11/2011       Medications   I have reviewed this patient's current medications       Review of Systems    The 5 point Review of Systems is negative other than noted in the HPI or here.      Physical Exam   Vital Signs: Temp: 97.5  F (36.4  C) Temp src: Oral BP: 135/85 Pulse: 98     SpO2: 98 % O2 Device: None (Room air)    Weight: 226 lbs 0 oz    Constitutional: awake, alert, cooperative, no apparent distress, and appears stated age.  Standing upright in room.  Eyes: lids and lashes normal, sclera clear, and conjunctiva normal  ENT: normocephalic, without obvious abnormality  Respiratory: Non-labored breathing on RA.  Cardiovascular: No obvious cardiac distress. Cap refill <2 secs at 3rd digit on R hand.   Skin: no bruising or bleeding, no redness, warmth, or swelling, no rashes, and no lesions on visualized skin.   Musculoskeletal: no lower extremity pitting edema present, no palpable cord. No deformities. R hand exam: slight swelling and evolving trace ecchymosis overlying volar aspect of R PIP joint on 3rd digit. Tenderness elicited w/ palpation over the MCP joints at fourth and fifth digits w/o tenderness in digits themselves. Pain overlying volar & palmar aspects of proximal and middle phalanges of 3rd digit, as well as volar aspect of PIP joint of 3rd digit. No subjective pain at snuffbox.   Neurologic: Moving all extremities equally and spontaneously. No obvious focal neuro deficits. Strength at BLEs 5/5 and symmetric. Ambulating non-focally. Normal AROM at R hand compared to L hand. No sensory deficits at 3rd digit of R hand.   Neuropsychiatric: General: normal, calm, and normal eye contact  Level of consciousness: alert / normal  Affect: flat    Medical Decision Making       45 MINUTES SPENT BY ME on the date of service doing chart review, history, exam, documentation & further activities per the note.      Data         Imaging results reviewed over the past 24 hrs:   Recent Results (from the past 24 hour(s))   XR Hand Right 2 Views    Narrative    EXAM: XR HAND RIGHT 2 VIEWS  LOCATION: [facility]  DATE: 4/11/2024    INDICATION: Pt punched door w  R hand; assess for fracture, acute injury  COMPARISON: None.      Impression    IMPRESSION: The right hand is negative for fracture. Soft tissue swelling over the dorsal aspect of the hand. No dislocation.     Recent Labs   Lab 04/12/24  [id number]  04/11/24  1643 04/11/24  [id number]   * 120* 131*

## 2024-04-13 ENCOUNTER — TELEPHONE (OUTPATIENT)
Dept: BEHAVIORAL HEALTH | Facility: CLINIC | Age: 34
End: 2024-04-13
Payer: MEDICARE

## 2024-04-13 ENCOUNTER — TELEPHONE (OUTPATIENT)
Dept: PSYCHIATRY | Facility: CLINIC | Age: 34
End: 2024-04-13
Payer: MEDICARE

## 2024-04-13 DIAGNOSIS — Z78.9 FEMALE-TO-MALE TRANSGENDER PERSON: ICD-10-CM

## 2024-04-13 DIAGNOSIS — F41.1 GAD (GENERALIZED ANXIETY DISORDER): ICD-10-CM

## 2024-04-13 DIAGNOSIS — F25.9 SCHIZOAFFECTIVE DISORDER, CHRONIC CONDITION WITH ACUTE EXACERBATION (H): ICD-10-CM

## 2024-04-13 DIAGNOSIS — F90.9 ATTENTION DEFICIT HYPERACTIVITY DISORDER (ADHD), UNSPECIFIED ADHD TYPE: ICD-10-CM

## 2024-04-13 RX ORDER — CLONAZEPAM 0.5 MG/1
0.25 TABLET ORAL 2 TIMES DAILY
Qty: 30 TABLET | Refills: 0 | Status: SHIPPED | OUTPATIENT
Start: 2024-04-13 | End: 2024-04-23

## 2024-04-13 RX ORDER — DEXTROAMPHETAMINE SACCHARATE, AMPHETAMINE ASPARTATE MONOHYDRATE, DEXTROAMPHETAMINE SULFATE AND AMPHETAMINE SULFATE 3.75; 3.75; 3.75; 3.75 MG/1; MG/1; MG/1; MG/1
15 CAPSULE, EXTENDED RELEASE ORAL DAILY
Qty: 30 CAPSULE | Refills: 0 | Status: SHIPPED | OUTPATIENT
Start: 2024-04-13 | End: 2024-04-19

## 2024-04-13 RX ORDER — CLONAZEPAM 0.5 MG/1
0.25 TABLET ORAL 2 TIMES DAILY PRN
Qty: 30 TABLET | Refills: 0 | Status: SHIPPED | OUTPATIENT
Start: 2024-04-13 | End: 2024-08-07

## 2024-04-13 RX ORDER — TESTOSTERONE GEL, 1% 10 MG/G
50 GEL TRANSDERMAL DAILY
Qty: 30 PACKET | Refills: 0 | Status: SHIPPED | OUTPATIENT
Start: 2024-04-13 | End: 2024-06-11

## 2024-04-13 NOTE — TELEPHONE ENCOUNTER
---------------------------------------------------------------------------------------------------------    Brief Psychiatry Phone note      Prakash Prasad MRN# [id number]   Age: 33 year old   Clinic Provider: Regine Last APRN CNP  YOB: 1990   PCP: Becki Avery          Phone note:   Writer was notified that Prakash Prasad has called and requested to speak with a resident regarding . Writer called Prakash Prasad at this number: [phone]  at 4:22 PM. He reported that he was discharged yesterday from station [facility] and that his controlled substance medications were not filled due to Doctor on Unit not signing the prescription when it was faxed over.    Patient was alert and conversational. Speech was clear with regular rate and rhythm. Thought process was logical and linear. Thought content was negative for safety concerns. Memory and attention were without gross deficit. Insight and judgment intact.          Assessment and Plan:   Prakash Prasad is a 33 year old adult with a history of Schizoaffective disorder, bipolar type  and Borderline personality disorder who contacted us regarding medication issues.     Writer called [facility] to confirm their records and they corroborated what patient had said. As such, medications (adderall, Klonopin BID and BID PRN, and testosterone) electronically re-sent to [facility].     Patient is not currently suicidal or homicidal. He is aware to call 911 or go to the nearest ER if safety concern or urgent symptoms arise.      Marlen Black MD  Psychiatry Resident

## 2024-04-13 NOTE — TELEPHONE ENCOUNTER
S: 1:57 Pm- Pt called reports controlled medications were not signed when faxed to his pharmcy and his meds have run out/      B: Pt is seen by Dr. Last the [facility].       A: Call back #: [phone]     R: Paged on call resident at 1:59 PM.   
No

## 2024-04-16 ENCOUNTER — VIRTUAL VISIT (OUTPATIENT)
Dept: PSYCHIATRY | Facility: CLINIC | Age: 34
End: 2024-04-16
Attending: NURSE PRACTITIONER
Payer: MEDICARE

## 2024-04-16 DIAGNOSIS — F43.10 PTSD (POST-TRAUMATIC STRESS DISORDER): ICD-10-CM

## 2024-04-16 DIAGNOSIS — F25.9 SCHIZOAFFECTIVE DISORDER, CHRONIC CONDITION WITH ACUTE EXACERBATION (H): Primary | ICD-10-CM

## 2024-04-16 DIAGNOSIS — F41.1 GAD (GENERALIZED ANXIETY DISORDER): ICD-10-CM

## 2024-04-16 DIAGNOSIS — F90.9 ATTENTION DEFICIT HYPERACTIVITY DISORDER (ADHD), UNSPECIFIED ADHD TYPE: ICD-10-CM

## 2024-04-16 PROCEDURE — 99443 PR PHYSICIAN TELEPHONE EVALUATION 21-30 MIN: CPT | Mod: FQ | Performed by: NURSE PRACTITIONER

## 2024-04-16 RX ORDER — QUETIAPINE FUMARATE 50 MG/1
50 TABLET, FILM COATED ORAL AT BEDTIME
Qty: 30 TABLET | Refills: 1 | Status: SHIPPED | OUTPATIENT
Start: 2024-04-16

## 2024-04-16 ASSESSMENT — PAIN SCALES - GENERAL: PAINLEVEL: NO PAIN (0)

## 2024-04-16 ASSESSMENT — PATIENT HEALTH QUESTIONNAIRE - PHQ9
10. IF YOU CHECKED OFF ANY PROBLEMS, HOW DIFFICULT HAVE THESE PROBLEMS MADE IT FOR YOU TO DO YOUR WORK, TAKE CARE OF THINGS AT HOME, OR GET ALONG WITH OTHER PEOPLE: NOT DIFFICULT AT ALL
SUM OF ALL RESPONSES TO PHQ QUESTIONS 1-9: 0
SUM OF ALL RESPONSES TO PHQ QUESTIONS 1-9: 0
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
## 2024-04-16 NOTE — PROGRESS NOTES
"Virtual Visit Details    Type of service:  Video Visit   Video Start Time: {video visit start/end time for provider to select:975851}  Video End Time:{video visit start/end time for provider to select:189385}    Originating Location (pt. Location): {video visit patient location:643313::\"Home\"}  {PROVIDER LOCATION On-site should be selected for visits conducted from your clinic location or adjoining [facility] hospital, academic office, or other nearby [facility] building. Off-site should be selected for all other provider locations, including home:202076}  Distant Location (provider location):  {virtual location provider:378278}  Platform used for Video Visit: {Virtual Visit Platforms:953479::\"TastemakerX\"}  "

## 2024-04-16 NOTE — NURSING NOTE
Is the patient currently in the state of [place]? YES    Visit mode:VIDEO    If the visit is dropped, the patient can be reconnected by: VIDEO VISIT: Text to cell phone:   Telephone Information:   Mobile [phone]       Will anyone else be joining the visit? NO  (If patient encounters technical issues they should call [phone])    How would you like to obtain your AVS? MyChart    Are changes needed to the allergy or medication list? No    Are refills needed on medications prescribed by this physician? NO    Reason for visit: RECHECK    Kezia VANN

## 2024-04-16 NOTE — PATIENT INSTRUCTIONS
-Discontinue AM Seroquel 50 mg. Monitor daytime sedation.  -Continue all other medication regimen for now.    Your next appointment is scheduled on 4/23/2024 (Tue) at 11:30am.      **For crisis resources, please see the information at the end of this document**   Patient Education    Thank you for coming to the [facility] [place] [facility].     Lab Testing:  If you had lab testing today and your results are reassuring or normal they will be mailed to you or sent through PANOSOL within 7 days. If the lab tests need quick action we will call you with the results. The phone number we will call with results is # [phone]. If this is not the best number please call our clinic and change the number.     Medication Refills:  If you need any refills please call your pharmacy and they will contact us. Our fax number for refills is [phone].   Three business days of notice are needed for general medication refill requests.   Five business days of notice are needed for controlled substance refill requests.   If you need to change to a different pharmacy, please contact the new pharmacy directly. The new pharmacy will help you get your medications transferred.     Contact Us:  Please call [phone] during business hours (8-5:00 M-F).   If you have medication related questions after clinic hours, or on the weekend, please call [phone].     Financial Assistance [phone]   Medical Records [phone]       MENTAL HEALTH CRISIS RESOURCES:  For a emergency help, please call 911 or go to the nearest Emergency Department.     Emergency Walk-In Options:   Em[facility] Unit @ [place] ([place]): [phone] - Specialized mental health emergency area designed to be calming  [facility] ([place]): [phone]  [facility] ([place]): [phone]  [facility]): [phone]    [facility] Crisis Information:   [place]:  [phone]  [place]: [phone]  [place] ([place]) - Adult: [phone]     Child: [phone]  [place] - Adult: [phone]     Child: [phone]  [place]: [phone]  List of all [facility] resources:   https://[place].gov/dhs/people-we-serve/adults/health-care/mental-health/resources/crisis-contacts.jsp    National Crisis Information:   Crisis Text Line: Text  [place]  to 767009  Suicide & Crisis Lifeline: 988  National Suicide Prevention Lifeline: 0-281-747-TALK (1-996.526.4890)       For online chat options, visit https://suicidepreventionlifeline.org/chat/  Poison Control Center: 8-209-991-3214  Trans Lifeline: 1-413.373.1189 - Hotline for transgender people of all ages  The [employer] Project: 5-382-242-3574 - Hotline for LGBT youth     For Non-Emergency Support:   Fast Tracker: Mental Health & Substance Use Disorder Resources -   https://www.investUPckensemblin.org/

## 2024-04-18 ENCOUNTER — VIRTUAL VISIT (OUTPATIENT)
Dept: FAMILY MEDICINE | Facility: CLINIC | Age: 34
End: 2024-04-18
Payer: MEDICARE

## 2024-04-18 ENCOUNTER — TELEPHONE (OUTPATIENT)
Dept: FAMILY MEDICINE | Facility: CLINIC | Age: 34
End: 2024-04-18

## 2024-04-18 DIAGNOSIS — E11.65 TYPE 2 DIABETES MELLITUS WITH HYPERGLYCEMIA, WITHOUT LONG-TERM CURRENT USE OF INSULIN (H): ICD-10-CM

## 2024-04-18 DIAGNOSIS — F60.3 BORDERLINE PERSONALITY DISORDER (H): ICD-10-CM

## 2024-04-18 DIAGNOSIS — M54.42 ACUTE BILATERAL LOW BACK PAIN WITH LEFT-SIDED SCIATICA: ICD-10-CM

## 2024-04-18 DIAGNOSIS — F31.64 BIPOLAR AFFECTIVE DISORDER, MIXED, SEVERE, WITH PSYCHOTIC BEHAVIOR (H): ICD-10-CM

## 2024-04-18 DIAGNOSIS — F25.9 SCHIZOAFFECTIVE DISORDER, CHRONIC CONDITION WITH ACUTE EXACERBATION (H): Primary | ICD-10-CM

## 2024-04-18 DIAGNOSIS — F25.0 SCHIZOAFFECTIVE DISORDER, BIPOLAR TYPE (H): ICD-10-CM

## 2024-04-18 DIAGNOSIS — I10 HYPERTENSION, UNSPECIFIED TYPE: ICD-10-CM

## 2024-04-18 PROCEDURE — 99214 OFFICE O/P EST MOD 30 MIN: CPT | Mod: 95 | Performed by: NURSE PRACTITIONER

## 2024-04-18 RX ORDER — NAPROXEN 500 MG/1
500 TABLET ORAL 2 TIMES DAILY WITH MEALS
Status: CANCELLED | OUTPATIENT
Start: 2024-04-18

## 2024-04-18 RX ORDER — NAPROXEN 500 MG/1
TABLET ORAL
Qty: 90 TABLET | Refills: 1 | Status: SHIPPED | OUTPATIENT
Start: 2024-04-18 | End: 2024-04-19

## 2024-04-18 RX ORDER — AMLODIPINE BESYLATE 10 MG/1
10 TABLET ORAL DAILY
Qty: 90 TABLET | Refills: 1 | Status: SHIPPED | OUTPATIENT
Start: 2024-04-18 | End: 2024-08-12

## 2024-04-18 NOTE — PROGRESS NOTES
"Chief Complaint   Patient presents with    Apnea     JOEY-Last seen 12/23/2020    SOB    Results     HST 03/25/2021         HPI:      Ms. Hill is a 26 y/o female patient who is in today for JOEY f/u.  Patient works at [employer] and will also be going back to school in October.  PMH includes PCOS, vertigo, snoring, JOEY, morbid obesity, subclinical hypothyroidism, polyarthralgia, vestibular migraine, chronic fatigue syndrome, anxiety, depression, cardiac palpitations, tachycardia, never smoker, COVID-19 positive in February 2022 and July 2022, chronic shortness of breath.    Patient completed home sleep study in March 2021 however CPAP therapy was not initiated.  She states she was never contacted by the DME company and there was no further follow-ups.  She goes to bed at 1 am and wakes up between 10-11 am. She denies morning headache or snoring.  She reports she has lost about 40 pounds at which point she noted that she no longer snores.  She does report snoring however if she is \"sick or has nasal congestion\".  She does have chronic fatigue syndrome and tends to be tired all day.  She also naps daily up to 3 hours.  She does not fall asleep while watching TV nor does she have trouble staying awake while driving.  She follows up with neurology for chronic migraine.    Patient was evaluated at [facility] on 9/1/2022 for lightheadedness, dizziness and near syncope as well as rapid heart rate.  She followed up with cardiology Dr. Gonzales for palpitations and tachycardia who per patient ordered further testing to rule out POTS.  Per patient she was also advised by her cardiologist to obtain a referral to pulmonology due to shortness of breath and possible COVID-19 long-hauler.  Patient tested positive for COVID-19 in February 2022 and July 2022.  She reports shortness of breath at rest and with activity as well as wheezing, and intermittent cough that produces yellow phlegm.  From chart review it appears the patient has " Prakash is a 33 year old who is being evaluated via a billable video visit.    How would you like to obtain your AVS? MyChart  If the video visit is dropped, the invitation should be resent by: Text to cell phone: [phone]  Will anyone else be joining your video visit? No      Assessment & Plan     Type 2 diabetes mellitus with hyperglycemia, without long-term current use of insulin (H)  Most recent A1c reviewed.  Refill sent.  Plan to continue current medications and dosages.  Follow-up in 3 months  - empagliflozin (JARDIANCE) 10 MG TABS tablet; Take 1 tablet (10 mg) by mouth daily +++NEED APPOINTMENT+++  - insulin glargine (LANTUS PEN) 100 UNIT/ML pen; Inject 25 Units Subcutaneous every morning (before breakfast)    Schizoaffective disorder, chronic condition with acute exacerbation (H)  Referral placed to mental health for new psychiatrist.  Referral placed to care coordination to expedite mental health referral.  Will need long-term psychiatrist-due to complexity not appropriate for management in primary care.  Informed that may need to continue to see current psychiatrist until can secure a new one-states is unwilling to do so.  Due to complexity of mental illness this writer is unable to fill medications.  - Adult Mental Health  Referral; Future  - Primary Care - Care Coordination Referral; Future    Schizoaffective disorder, bipolar type (H)  Referral placed to mental health for new psychiatrist.  Referral placed to care coordination to expedite mental health referral.  Will need long-term psychiatrist-due to complexity not appropriate for management in primary care.  Informed that may need to continue to see current psychiatrist until can secure a new one-states is unwilling to do so.  Due to complexity of mental illness this writer is unable to fill medications.  - Adult Mental Health  Referral; Future  - Primary Care - Care Coordination Referral; Future    Bipolar affective disorder,  chronic shortness of breath.  She has had to utilize albuterol daily but typically only once, and very rarely twice a day.      Sleep/Card Study History:   Echo from 9/2/22 indicated the left ventricle is normal in size and thickness.The left ventricular ejection fraction is visually estimated to be 65%. Normal inferior vena cava size and inspiratory collapse. Normal   diastolic function.    HSS from 3/25/21 indicated Mild JOEY with CAMRON:8/h with desaturations to 86% Sp02.     ROS:    Constitutional: Denies fevers, Denies weight changes  Eyes: Denies changes in vision, no eye pain  Ears/Nose/Throat/Mouth: Denies nasal congestion or sore throat   Cardiovascular: Denies chest pain, + palpitations   Respiratory: + chronic shortness of breath at rest and with activity, + intermittent cough with yellow phlegm, + wheezing.   Gastrointestinal/Hepatic: Denies abdominal pain, nausea, vomiting,   Skin/Breast: Denies rash,   Neurological: Chronic headache, denies confusion,   Psychiatric: denies mood disorder   Sleep: + intermittent snoring       Past Medical History:   Diagnosis Date    Anemia     hx of borderline anemia    Anxiety     Celiac disease     Daytime sleepiness     Depression     Insomnia     Insulin resistance     Migraine     Morning headache     PCOS (polycystic ovarian syndrome)     Snoring     Vertigo     Vitamin D deficiency        Past Surgical History:   Procedure Laterality Date    MATTHIAS BY LAPAROSCOPY N/A 9/13/2018    Procedure: MATTHIAS BY LAPAROSCOPY;  Surgeon: Hayden Wilhelm M.D.;  Location: SURGERY [facility];  Service: General       Family History   Problem Relation Age of Onset    Thyroid Mother     Depression Father     Alcohol abuse Father     No Known Problems Maternal Grandmother     Heart Attack Maternal Grandfather 66    Cancer Paternal Grandmother         Breast , esophageal    Stroke Paternal Grandmother 66    Alcohol abuse Paternal Grandfather     Lung Disease Paternal Grandfather   mixed, severe, with psychotic behavior (H)  Referral placed to mental health for new psychiatrist.  Referral placed to care coordination to expedite mental health referral.  Will need long-term psychiatrist-due to complexity not appropriate for management in primary care.  Informed that may need to continue to see current psychiatrist until can secure a new one-states is unwilling to do so.  Due to complexity of mental illness this writer is unable to fill medications.  - Adult Mental Health  Referral; Future  - Primary Care - Care Coordination Referral; Future    Borderline personality disorder (H)  Referral placed to mental health for new psychiatrist.  Referral placed to care coordination to expedite mental health referral.  Will need long-term psychiatrist-due to complexity not appropriate for management in primary care.  Informed that may need to continue to see current psychiatrist until can secure a new one-states is unwilling to do so.  Due to complexity of mental illness this writer is unable to fill medications.  - Adult Mental Health  Referral; Future  - Primary Care - Care Coordination Referral; Future    Acute bilateral low back pain with left-sided sciatica  Follow through with physical therapy that already has scheduled.  Education given regarding use of naproxen and lithium-encouraged to use sparingly.  Will obtain imaging.  Continue with heat as needed  - naproxen (NAPROSYN) 500 MG tablet; 1 tablet daily as needed for pain.  Use sparingly.  - XR Lumbar Spine 2/3 Views; Future    Hypertension, unspecified type  Blood pressures during hospitalization reviewed.  Amlodipine refilled.  Hospitalization labs reviewed.  - amLODIPine (NORVASC) 10 MG tablet; Take 1 tablet (10 mg) by mouth daily    Review of external notes as documented elsewhere in note  Review of the result(s) of each unique test - Labs  Ordering of each unique test  Prescription drug management    MED REC REQUIRED  Post     Sleep Apnea Brother        Social History     Socioeconomic History    Marital status: Single     Spouse name: Not on file    Number of children: Not on file    Years of education: Not on file    Highest education level: Not on file   Occupational History    Occupation: CNA [employer]     Comment: CNA ; Nursing student   Tobacco Use    Smoking status: Never    Smokeless tobacco: Never   Vaping Use    Vaping Use: Never used   Substance and Sexual Activity    Alcohol use: Yes     Comment: occasionally    Drug use: No    Sexual activity: Never     Partners: Male, Female     Birth control/protection: Pill   Other Topics Concern    Not on file   Social History Narrative    Not on file     Social Determinants of Health     Financial Resource Strain: Not on file   Food Insecurity: Not on file   Transportation Needs: Not on file   Physical Activity: Not on file   Stress: Not on file   Social Connections: Not on file   Intimate Partner Violence: Not on file   Housing Stability: Not on file       Allergies as of 09/06/2022 - Reviewed 09/06/2022   Allergen Reaction Noted    Gluten meal Unspecified 06/16/2018        Vitals:  Vitals:    09/06/22 1123   BP: 128/76   Pulse: 83   SpO2: 96%       Current medications as of today   Current Outpatient Medications   Medication Sig Dispense Refill    metFORMIN ER (GLUCOPHAGE XR) 500 MG TABLET SR 24 HR Take 2,000 mg by mouth every day.      traZODone (DESYREL) 100 MG Tab Take 100 mg by mouth at bedtime as needed for Sleep.      betamethasone dipropionate (DIPROLENE) 0.05 % Ointment Apply 1 Application topically every day. Apply to hands      topiramate (TOPAMAX) 50 MG tablet Take 50 mg by mouth every day.      sertraline (ZOLOFT) 100 MG Tab Take 2 Tablets by mouth every day. 60 Tablet 1    tretinoin (RETIN-A) 0.025 % gel APPLY 1 APPLICATION TOPICALLY EVERY BEDTIME. START WITH 2 -3 APPS PER WEEK AND WORK UP AS TOLERATED 45 g 0    Norgestim-Eth Estrad Triphasic 0.18/0.215/0.25 MG-25 MCG  Medication Reconciliation Status: discharge medications reconciled and changed, per note/orders      Subjective   Prakash is a 33 year old, presenting for the following health issues:  Hospital F/U      4/18/2024     8:31 AM   Additional Questions   Roomed by Kavitha GUERRERO         4/18/2024     8:31 AM   Patient Reported Additional Medications   Patient reports taking the following new medications No new medications to add     Video Start Time: 9:36 AM    [facility]       Hospital Follow-up Visit:    Hospital/Nursing Home/IP Rehab Facility: [facility]  Date of Admission: 3/25/24  Date of Discharge: 4/12/24  Reason(s) for Admission: Schizoaffective disorder, bipolar type (H)   Borderline personality disorder   Was the patient in the ICU or did the patient experience delirium during hospitalization?  No  Do you have any other stressors you would like to discuss with your provider? No    Problems taking medications regularly:  None  Medication changes since discharge: Discontinued Propranolol  Problems adhering to non-medication therapy:  None    Summary of hospitalization:  [facility] discharge summary reviewed  Diagnostic Tests/Treatments reviewed.  Follow up needed: none  Other Healthcare Providers Involved in Patient s Care:         Specialist appointment - psychiatry, physical therapy, spine surgeon  Update since discharge: stable.         Plan of care communicated with patient         Video visit completed for hospital follow-up.  Was inpatient on behavioral health unit from March 25 to April 12 due to auditory homicidal hallucinations.  Reports is no longer having any auditory hallucinations.  Had follow-up with psychiatrist on April 16.  Ace is no longer wanting to see this psychiatrist and would like to see a new psychiatrist.  Feels current psychiatrist overstepped boundaries    Is not currently checking blood sugars at home.  Has been taking all  Tab Take 1 Tablet by mouth every day.      VITAMIN D PO Take 1 Tablet by mouth every day.      albuterol 108 (90 Base) MCG/ACT Aero Soln inhalation aerosol Inhale 2 Puffs by mouth every four hours as needed for Shortness of Breath. 1 Inhaler 0     No current facility-administered medications for this visit.         Physical Exam:   Appearance: Well developed, obese, no acute distress  Eyes: PERRL, EOM intact, sclera white, conjunctiva moist  Ears: no lesions or deformities  Hearing: grossly intact  Nose: no lesions or deformities  Respiratory effort: no intercostal retractions or use of accessory muscles  Lung auscultation: no rales, rhonchi or wheezes bilaterally. Clear to ausculation bilaterally.   Heart auscultation: no murmur rub or gallop, RRR  Extremities: no cyanosis or edema  Abdomen: soft, large  Gait and Station: normal  Digits and nails: no clubbing, cyanosis, petechiae or nodes.  Cranial nerves: grossly intact  Skin: no visible rashes, lesions or ulcers noted  Orientation: Oriented to time, person and place  Mood and affect: mood and affect appropriate, normal interaction with examiner  Judgement: Intact    Assessment:  1. JOEY (obstructive sleep apnea)  Overnight Home Sleep Study      2. Chronic fatigue syndrome        3. Shortness of breath  CT-CHEST (THORAX) W/O    PULMONARY FUNCTION TESTS -Test requested: Complete Pulmonary Function Test    Referral to Pulmonary and Sleep Medicine      4. History of COVID-19  CT-CHEST (THORAX) W/O    PULMONARY FUNCTION TESTS -Test requested: Complete Pulmonary Function Test    Referral to Pulmonary and Sleep Medicine      5. Heart palpitations        6. BMI 40.0-44.9, adult (HCC)  Patient identified as having weight management issue.  Appropriate orders and counseling given.            Plan  Discussed the cardiovascular and neuropsychiatric risks of untreated JOEY; including but not limited to: HTN, DM, MI, ASCVD, CVA, CHF, traffic accidents.     1-2.  Patient is  medications and insulin.  Has noticed that semaglutide/Rybelsus has left a different taste in mouth.  No other negative side effects that is aware of since starting it.    Lower back pain both sides of lower back. Pain going down left leg. Does have numbness and tingling since previous back surgery but is not increased from baseline. Is scheduled to surgery. No new injury. Did sign up for PHYSICAL THERAPY that  is start in May. Pain is at about a 5-6. No over the counter  medications for pain. Is putting some heat on lower back and that does seem to help. No loss of control of bowels or bladder, no saddle anethesia. Pain started again when was in the hospital then ran a lot playing flag football, did not get tackled or fall when was playing. Is not interfering with sleep. This is different than previous pain, is not as dibilating. Did try the lidocane patches and they really did not help.         Objective           Vitals:  No vitals were obtained today due to virtual visit.    Physical Exam  Constitutional:       Appearance: Normal appearance.   HENT:      Nose: No congestion.   Eyes:      General: Lids are normal.   Pulmonary:      Effort: No tachypnea, bradypnea or respiratory distress.   Skin:     Coloration: Skin is not ashen, cyanotic, jaundiced or pale.   Neurological:      Mental Status: He is alert.   Psychiatric:         Mood and Affect: Mood normal.         Speech: Speech normal.         Behavior: Behavior normal.              Video-Visit Details    Type of service:  Video Visit   Video End Time:9:56 AM  Originating Location (pt. Location): Home    Distant Location (provider location):  On-site  Platform used for Video Visit: Evens  Signed Electronically by: BROOKLYN Cruz CNP     amenable to repeating testing.  Order updated HSS since prior sleep study is outdated. Will review at next f/u OV.     *HSS from 3/25/21 indicated Mild JOEY with CAMRON:8/h with desaturations to 86% Sp02. CPAP was not initiated and was lost to f/u.     *Sleep hygiene discussed. Recommend keeping a set sleep/wake schedule. Logging enough hours of sleep. Limiting/Avoiding naps to 30-40 min. No caffeine after noon and no heavy meals in the evening.     3-4.  Referral to pulmonology for SOB and possible Covid 19 long hauler. SOB appears chronic from chart review. Order chest CT and PFT.  Recent chest X-ray was normal. Advised patient to complete testing prior to consult with pulmonology. Continue using Albuterol 108 mcg 2 puffs q 4 hrs prn SOB.   5. Continue to f/u with cardiology, Dr. Abad.  6. Encouraged a healthy diet and exercise to help with weight loss and management.   If your BMI is 25-29.9 you are overweight. If your BMI is 30 or greater you are obese. To lose weight eat less, move more, or both. Any diet that reduces caloric intake can help with weight loss. Extra weight may reduce your lifespan. Avoid dramatic unsustainable dietary changes that result in the yo-yo effect (down then back up.)  Usually small modifications in diet and exercise are easier to stick with.  7. Follow up with appropriate healthcare providers for all other medical problems.  8. F/u in 6 weeks for JOEY, sleep study results.        MARKY Dodson.      This dictation was created using voice recognition software. The accuracy of the dictation is limited to the abilities of the software. I expect there may be some errors of grammar and possibly content.

## 2024-04-18 NOTE — TELEPHONE ENCOUNTER
Interaction check completed-medium risk.  Dosing intentionally decreased to once per day and to use sparingly.  Can decrease quantity to 14.    Becki Avery NP-C

## 2024-04-18 NOTE — TELEPHONE ENCOUNTER
Called and relayed below message to Hanane. She verbalized understanding.     Kristin Machado RN

## 2024-04-18 NOTE — TELEPHONE ENCOUNTER
Pharmacist Hanane called from [facility] pharmacy.   She can be reached at [phone]    She wanted to let Provider know that Naproxen is contraindicated because patient is taking Lithium.     Please review and advise.     Kaylie CHOI  [facility]

## 2024-04-19 ENCOUNTER — TELEPHONE (OUTPATIENT)
Dept: PSYCHIATRY | Facility: CLINIC | Age: 34
End: 2024-04-19

## 2024-04-19 ENCOUNTER — HOSPITAL ENCOUNTER (EMERGENCY)
Facility: CLINIC | Age: 34
Discharge: HOME | End: 2024-04-19
Attending: EMERGENCY MEDICINE | Admitting: EMERGENCY MEDICINE
Payer: MEDICARE

## 2024-04-19 ENCOUNTER — PATIENT OUTREACH (OUTPATIENT)
Dept: CARE COORDINATION | Facility: CLINIC | Age: 34
End: 2024-04-19

## 2024-04-19 VITALS
OXYGEN SATURATION: 97 % | HEART RATE: 90 BPM | RESPIRATION RATE: 17 BRPM | DIASTOLIC BLOOD PRESSURE: 88 MMHG | TEMPERATURE: 98.2 F | SYSTOLIC BLOOD PRESSURE: 111 MMHG

## 2024-04-19 DIAGNOSIS — T42.6X1A GABAPENTIN OVERDOSE, ACCIDENTAL OR UNINTENTIONAL, INITIAL ENCOUNTER: ICD-10-CM

## 2024-04-19 DIAGNOSIS — F10.920 ALCOHOLIC INTOXICATION WITHOUT COMPLICATION (H): ICD-10-CM

## 2024-04-19 DIAGNOSIS — I48.91 ATRIAL FIBRILLATION WITH RVR (H): ICD-10-CM

## 2024-04-19 LAB
ALBUMIN SERPL BCG-MCNC: 4.2 G/DL (ref 3.5–5.2)
ALBUMIN SERPL BCG-MCNC: 5 G/DL (ref 3.5–5.2)
ALP SERPL-CCNC: 79 U/L (ref 40–150)
ALP SERPL-CCNC: 94 U/L (ref 40–150)
ALT SERPL W P-5'-P-CCNC: 70 U/L (ref 0–70)
ALT SERPL W P-5'-P-CCNC: 85 U/L (ref 0–70)
ANION GAP SERPL CALCULATED.3IONS-SCNC: 17 MMOL/L (ref 7–15)
ANION GAP SERPL CALCULATED.3IONS-SCNC: 20 MMOL/L (ref 7–15)
APAP SERPL-MCNC: <5 UG/ML (ref 10–30)
AST SERPL W P-5'-P-CCNC: 56 U/L (ref 0–45)
AST SERPL W P-5'-P-CCNC: ABNORMAL U/L
ATRIAL RATE - MUSE: 127 BPM
BASOPHILS # BLD AUTO: 0.1 10E3/UL (ref 0–0.2)
BASOPHILS NFR BLD AUTO: 1 %
BILIRUB SERPL-MCNC: 0.4 MG/DL
BILIRUB SERPL-MCNC: 0.5 MG/DL
BUN SERPL-MCNC: 13.7 MG/DL (ref 6–20)
BUN SERPL-MCNC: 15.2 MG/DL (ref 6–20)
CALCIUM SERPL-MCNC: 8.6 MG/DL (ref 8.6–10)
CALCIUM SERPL-MCNC: 9.7 MG/DL (ref 8.6–10)
CHLORIDE SERPL-SCNC: 102 MMOL/L (ref 98–107)
CHLORIDE SERPL-SCNC: 108 MMOL/L (ref 98–107)
CREAT SERPL-MCNC: 0.7 MG/DL (ref 0.51–1.17)
CREAT SERPL-MCNC: 0.87 MG/DL (ref 0.51–1.17)
DEPRECATED HCO3 PLAS-SCNC: 18 MMOL/L (ref 22–29)
DEPRECATED HCO3 PLAS-SCNC: 20 MMOL/L (ref 22–29)
DIASTOLIC BLOOD PRESSURE - MUSE: NORMAL MMHG
EGFRCR SERPLBLD CKD-EPI 2021: 90 ML/MIN/1.73M2
EGFRCR SERPLBLD CKD-EPI 2021: >90 ML/MIN/1.73M2
EOSINOPHIL # BLD AUTO: 0.2 10E3/UL (ref 0–0.7)
EOSINOPHIL NFR BLD AUTO: 2 %
ERYTHROCYTE [DISTWIDTH] IN BLOOD BY AUTOMATED COUNT: 14.6 % (ref 10–15)
ETHANOL SERPL-MCNC: 0.12 G/DL
GLUCOSE SERPL-MCNC: 128 MG/DL (ref 70–99)
GLUCOSE SERPL-MCNC: 145 MG/DL (ref 70–99)
HCT VFR BLD AUTO: 49.5 % (ref 35–53)
HGB BLD-MCNC: 16.4 G/DL (ref 13.3–17.7)
HOLD SPECIMEN: NORMAL
IMM GRANULOCYTES # BLD: 0.1 10E3/UL
IMM GRANULOCYTES NFR BLD: 1 %
INTERPRETATION ECG - MUSE: NORMAL
LYMPHOCYTES # BLD AUTO: 3.7 10E3/UL (ref 0.8–5.3)
LYMPHOCYTES NFR BLD AUTO: 31 %
MCH RBC QN AUTO: 27.7 PG (ref 26.5–33)
MCHC RBC AUTO-ENTMCNC: 33.1 G/DL (ref 31.5–36.5)
MCV RBC AUTO: 84 FL (ref 78–100)
MONOCYTES # BLD AUTO: 0.6 10E3/UL (ref 0–1.3)
MONOCYTES NFR BLD AUTO: 5 %
NEUTROPHILS # BLD AUTO: 7.4 10E3/UL (ref 1.6–8.3)
NEUTROPHILS NFR BLD AUTO: 60 %
NRBC # BLD AUTO: 0 10E3/UL
NRBC BLD AUTO-RTO: 0 /100
P AXIS - MUSE: NORMAL DEGREES
PLATELET # BLD AUTO: 293 10E3/UL (ref 150–450)
POTASSIUM SERPL-SCNC: 3.7 MMOL/L (ref 3.4–5.3)
POTASSIUM SERPL-SCNC: 3.7 MMOL/L (ref 3.4–5.3)
PR INTERVAL - MUSE: NORMAL MS
PROT SERPL-MCNC: 7.2 G/DL (ref 6.4–8.3)
PROT SERPL-MCNC: 8.6 G/DL (ref 6.4–8.3)
QRS DURATION - MUSE: 98 MS
QT - MUSE: 404 MS
QTC - MUSE: 570 MS
R AXIS - MUSE: 47 DEGREES
RBC # BLD AUTO: 5.92 10E6/UL (ref 3.8–5.9)
SALICYLATES SERPL-MCNC: <0.3 MG/DL
SODIUM SERPL-SCNC: 142 MMOL/L (ref 135–145)
SODIUM SERPL-SCNC: 143 MMOL/L (ref 135–145)
SYSTOLIC BLOOD PRESSURE - MUSE: NORMAL MMHG
T AXIS - MUSE: 27 DEGREES
VENTRICULAR RATE- MUSE: 120 BPM
WBC # BLD AUTO: 12 10E3/UL (ref 4–11)

## 2024-04-19 PROCEDURE — 250N000013 HC RX MED GY IP 250 OP 250 PS 637: Performed by: EMERGENCY MEDICINE

## 2024-04-19 PROCEDURE — 250N000011 HC RX IP 250 OP 636: Performed by: EMERGENCY MEDICINE

## 2024-04-19 PROCEDURE — 36415 COLL VENOUS BLD VENIPUNCTURE: CPT | Performed by: EMERGENCY MEDICINE

## 2024-04-19 PROCEDURE — 96374 THER/PROPH/DIAG INJ IV PUSH: CPT | Performed by: EMERGENCY MEDICINE

## 2024-04-19 PROCEDURE — 85025 COMPLETE CBC W/AUTO DIFF WBC: CPT | Performed by: EMERGENCY MEDICINE

## 2024-04-19 PROCEDURE — 80179 DRUG ASSAY SALICYLATE: CPT | Performed by: EMERGENCY MEDICINE

## 2024-04-19 PROCEDURE — 258N000003 HC RX IP 258 OP 636: Performed by: EMERGENCY MEDICINE

## 2024-04-19 PROCEDURE — 99291 CRITICAL CARE FIRST HOUR: CPT | Mod: 25 | Performed by: EMERGENCY MEDICINE

## 2024-04-19 PROCEDURE — 96376 TX/PRO/DX INJ SAME DRUG ADON: CPT | Performed by: EMERGENCY MEDICINE

## 2024-04-19 PROCEDURE — 96361 HYDRATE IV INFUSION ADD-ON: CPT | Performed by: EMERGENCY MEDICINE

## 2024-04-19 PROCEDURE — 80143 DRUG ASSAY ACETAMINOPHEN: CPT | Performed by: EMERGENCY MEDICINE

## 2024-04-19 PROCEDURE — 93005 ELECTROCARDIOGRAM TRACING: CPT | Performed by: EMERGENCY MEDICINE

## 2024-04-19 PROCEDURE — 250N000009 HC RX 250: Performed by: EMERGENCY MEDICINE

## 2024-04-19 PROCEDURE — 84155 ASSAY OF PROTEIN SERUM: CPT | Mod: 91 | Performed by: EMERGENCY MEDICINE

## 2024-04-19 PROCEDURE — 258N000003 HC RX IP 258 OP 636

## 2024-04-19 PROCEDURE — 96375 TX/PRO/DX INJ NEW DRUG ADDON: CPT | Performed by: EMERGENCY MEDICINE

## 2024-04-19 PROCEDURE — 93010 ELECTROCARDIOGRAM REPORT: CPT | Performed by: EMERGENCY MEDICINE

## 2024-04-19 PROCEDURE — 82077 ASSAY SPEC XCP UR&BREATH IA: CPT | Performed by: EMERGENCY MEDICINE

## 2024-04-19 RX ORDER — METOPROLOL TARTRATE 25 MG/1
25 TABLET, FILM COATED ORAL ONCE
Status: COMPLETED | OUTPATIENT
Start: 2024-04-19 | End: 2024-04-19

## 2024-04-19 RX ORDER — METOPROLOL TARTRATE 1 MG/ML
5 INJECTION, SOLUTION INTRAVENOUS ONCE
Status: COMPLETED | OUTPATIENT
Start: 2024-04-19 | End: 2024-04-19

## 2024-04-19 RX ORDER — ONDANSETRON 2 MG/ML
4 INJECTION INTRAMUSCULAR; INTRAVENOUS ONCE
Status: COMPLETED | OUTPATIENT
Start: 2024-04-19 | End: 2024-04-19

## 2024-04-19 RX ORDER — METOPROLOL SUCCINATE 25 MG/1
25 TABLET, EXTENDED RELEASE ORAL DAILY
Qty: 14 TABLET | Refills: 0 | Status: SHIPPED | OUTPATIENT
Start: 2024-04-19 | End: 2024-06-05

## 2024-04-19 RX ORDER — LORAZEPAM 2 MG/ML
1 INJECTION INTRAMUSCULAR ONCE
Status: COMPLETED | OUTPATIENT
Start: 2024-04-19 | End: 2024-04-19

## 2024-04-19 RX ORDER — SODIUM CHLORIDE 9 MG/ML
INJECTION, SOLUTION INTRAVENOUS
Status: COMPLETED
Start: 2024-04-19 | End: 2024-04-19

## 2024-04-19 RX ADMIN — SODIUM CHLORIDE 1000 ML: 9 INJECTION, SOLUTION INTRAVENOUS at 04:27

## 2024-04-19 RX ADMIN — NICOTINE POLACRILEX 2 MG: 2 GUM, CHEWING BUCCAL at 11:33

## 2024-04-19 RX ADMIN — METOPROLOL TARTRATE 5 MG: 1 INJECTION, SOLUTION INTRAVENOUS at 05:51

## 2024-04-19 RX ADMIN — METOPROLOL TARTRATE 25 MG: 25 TABLET, FILM COATED ORAL at 07:38

## 2024-04-19 RX ADMIN — LORAZEPAM 1 MG: 2 INJECTION INTRAMUSCULAR; INTRAVENOUS at 10:09

## 2024-04-19 RX ADMIN — SODIUM CHLORIDE 1000 ML: 9 INJECTION, SOLUTION INTRAVENOUS at 03:01

## 2024-04-19 RX ADMIN — Medication 1000 ML: at 04:27

## 2024-04-19 RX ADMIN — NICOTINE POLACRILEX 4 MG: 4 GUM, CHEWING BUCCAL at 07:39

## 2024-04-19 RX ADMIN — ONDANSETRON 4 MG: 2 INJECTION INTRAMUSCULAR; INTRAVENOUS at 09:04

## 2024-04-19 RX ADMIN — ONDANSETRON 4 MG: 2 INJECTION INTRAMUSCULAR; INTRAVENOUS at 03:02

## 2024-04-19 ASSESSMENT — ACTIVITIES OF DAILY LIVING (ADL)
ADLS_ACUITY_SCORE: 39

## 2024-04-19 NOTE — ED PROVIDER NOTES
Emergency Department Patient Sign-out       Brief HPI:  This is a 33 year old adult signed out to me by Dr. Godfrey .  See initial ED Provider note for details of the presentation.  In summary this patient has a history of schizoaffective disorder, PTSD, bipolar 1, borderline personality disorder, presented with alcohol intoxication, gabapentin, marijuana, mushroom use.  He was found to be in A-fib with RVR with heart rates in the 130s, hemodynamically stable.  No history of A-fib.  She was rate controlled with metoprolol IV and started on p.o. metoprolol and apixaban for [id number] 2.  Pending DEC evaluation to see if he can be discharged back to his group home.              Impression:    ICD-10-CM    1. Atrial fibrillation with RVR (H)  I48.91 Adult Cardiology Eval  Referral      2. Alcoholic intoxication without complication (H24)  F10.920       3. Gabapentin overdose, accidental or unintentional, initial encounter  T42.6X1A           Plan:    Patient remained rate controlled in the emergency department after receiving p.o. metoprolol, did not receive any additional IV medications.    He was discharged with prescription for metoprolol, apixaban and cardiology referral.  Patient states that he received a phone call while in the emergency department, and already has an appointment for this coming Monday.  Patient was evaluated by our DEC .  They spoke with his group home, care management, psychiatric clinic.  Patient was under a mental illness commitment, but was released on a provisional discharge.  Patient denies any SI/HI/intent today, and appears to be similar to his baseline.  Additional resources were arranged by our DEC .  They are scheduling a care conference with care management later today.  In addition, patient was set up with a sexual wellness [place] to follow-up with outpatient and outpatient substance use treatment.  Group home administers the patient's medications,  and they are okay with the patient returning.  Referral for psychiatry also placed.  After speaking with the psychiatry clinic, it was discussed on the last visit to discontinue Adderall.  This was not stopped at the time, however just not renewed.  Patient does not have any acute mental illness concerns at this time, and has gathered increased outpatient resources while in the emergency department thinks to our DEC .  Patient wanted to go back to the group home.  Ride was arranged and they are discharged with return precautions.       Lelia Beckham MD  04/19/24 [id number]

## 2024-04-19 NOTE — TELEPHONE ENCOUNTER
From: Regine Last APRN CNP   Sent: 4/19/2024   8:29 AM CDT   To: Psychiatry Nurse-[facility]   Subject: BEC                                      I sent ED provider who already signed off, but recommended I talk to [facility] .  Would one of you contact BEC  to inform her that some type of behavior change was expected since I told him that I will disocntinue stimulant after impulsive ingestion of 6 Adderall prior to last hospitalization and CM and GH were made aware of it for close monitoring after seeing him on 4/16?  Also he is MI commitment.  I left VM to CM and GH as well to see if they can do care conference today.  I know he is requesting new psych provider and PCP put long term psych provider plan, but to encourage to contact [facility] since pt will meet criteria of [facility] psych provider (has to be LGBTQ) and has soon opening?  I want to make sure he doesn't have a gap in care.  Thank you!     Follow Up:  Writer called DEC  to discuss the above. Writer spoke with Ayana .     Ayana states that they do not think patient currently needs inpatient mental health and their recommendation will likely be discharge, as patient has DBT group starting next week and a substance abuse program at [facility] next week. They feel that those longer term community-based resources will be most beneficial at this time. However, attending provider will have the final say.     Ayana states that if provider has other relevant details with regard to safety or concerns about community resources not being adequate, their direct line is [phone]    Ayana will also try to connect with patient's CM and had already spoken with Domenica, patients  manager.

## 2024-04-19 NOTE — CONFIDENTIAL NOTE
Telephone call    Start: 1600 End: 1648    Called back Cristy BARTHOLOMEW. Cristy noted that pt was upset that this writer contacted  after the visit about treatment plan. Also was upset that stimulant will be discontinued and requested transfer of care.    Cristy noted pt shared confidentially to her that he was buying Gabapentin off of the street and taking high dose. She did not report this to any prescriber as this was shared in confidence. But noted to clinical supervisor.  Strongly encouraged if anything like this was to ever happen in the future to share with prescribers because pt reported Gabapentin [id number] mg with alcohol and this could be lethal. She previously noted pt was using substance but did not want to this writer to share this to patient on 10/20/23 as this was shared by pt to CM in confidence. Noted importance of sharing these information because of medication safety and note medication effectiveness vs substance induced symptoms change.    CM also noted difficulties interacting with  owner.  Shared GH owner's concern; aggression particularly at the time of medication administration and  owner instructed  staff not to confront pt when he declines to take medications in front of staff, but can check medication administered though it is uncertain if pt is taking medication or not. Discussed uncertainty of medication not being taken or taken or effectiveness of medication in symptom management.    Discussed pt has appt with Vance Vega on 5/1 for transfer of care. Benefits of seeing Vance may be that he can re-establish therapist at [facility] that would help.  Cristy also noted he has ppt with DBT individual therapist and substance use treatment program at [facility] next week. Pt historically has not followed up on these appointments.    Also notified that pt has appt with Concha Viramontes for psychological evaluation for personality disorder.  However, discussed this may not be valid testing especially if  he has been using substance recently.  Cristy will discuss with this pt and if pt does not feel this will be appropriate timeline to get evaluated, strongly encouraged to reschedule as he already had 2 no shows (while hospitalized).    Cristy asked if this writer will be open to seeing this pt if pt decides. Discussed concern about not able to guarantee safe medication administration and medication record accuracy (pt may refuse medication as he is not jarvised, but needs accurate medication administration record to determine medication effectiveness). Though consistently would be beneficial for this pt, pt will likely benefit from having a psych provider within community organization where comprehensive care is available) but also if he continues to plan to have gender affirming surgery at [facility], may be beneficial to stay at [facility].    Cristy will discuss treatment plan with pt and will make decision on future care.    Regine Last, NELLY, 4/19/2024

## 2024-04-19 NOTE — ED TRIAGE NOTES
Triage Assessment (Adult)       Row Name 04/19/24 0209          Triage Assessment    Airway WDL WDL        Respiratory WDL    Respiratory WDL WDL        Skin Circulation/Temperature WDL    Skin Circulation/Temperature WDL WDL        Cardiac WDL    Cardiac WDL WDL        Peripheral/Neurovascular WDL    Peripheral Neurovascular WDL WDL        Cognitive/Neuro/Behavioral WDL    Cognitive/Neuro/Behavioral WDL X;orientation     Orientation disoriented to;situation;place

## 2024-04-19 NOTE — TELEPHONE ENCOUNTER
Care conference with Domenica,  owner.    Start: 1026 End 1052    Phone call for Domenica. She does not know where pt obtained Gabapentin from. But notes there's another resident that pt is very close that she and  staff suspects may be providing medications to each other or getting prescription or substance from somewhere and also providing to other residents. Pt has reported that there's different residents in the past that he does not get along well or providing substance to other people, but even when he moved to different , other residents complained that pt is supplying Gabapentin to them and eventually pt moved back to original  (both  are owned by Domenica).     Pt often also threaten Domenica to change staff because of their attitude or otherwise pt will threaten to harm staff. She has changed numerous staff previously due to pt's threat. All of her staff are people of color, but it doesn't sound there's any differences gender (male or female staff only). But pt becomes aggressive when informed that pt needs to take medication in front of staff. Then Domenica instruct staff not to confront due to previous aggression and violence to staff. So pt may be not taking medications, but hoarding pills. Pt is given pills each time, not bottles, but no witness of taking medications. Domenica also noted pt has also ran after her and threatened her as well. Domenica has asked pt to change behavior otherwise to ask to leave multiple times. But pt changes behavior for a while and continue to have same threats of violence or actual aggression.    She also noted other resident that pt is very close to and pt keeps changing psychiatric providers when they can't get medications they want and concerned about harm to others since pt is suspected to provide medications to others or harm to himself. She noted if there will be a change on psych providers, likely she will ask him to move especially when he does not sign VAL to get information from this  writer.    Domenica also noted she has rare contact with CM, CM just come and check in with pt only.    Regine Last, CNP, 4/19/2024

## 2024-04-19 NOTE — ED NOTES
Patient was drowsy, aroused to voice. He fell asleep after episodes of vomiting. Patient's shirt was changed to scrub. Bedding was also changed.

## 2024-04-19 NOTE — PROGRESS NOTES
Clinic Care Coordination Contact    Situation: Patient chart reviewed by care coordinator.    Background: Patient a new CC referral     Assessment: She is currently in the ED    Plan/Recommendations: SW will review early next week, call patient after discharge for needs assessment    RAYMUNDO Augustin, MSW   [facility]  Care Coordination  [facility]  [phone]  4/19/2024 11:00 AM

## 2024-04-19 NOTE — ED PROVIDER NOTES
ED Provider Note  [facility]      History     Chief Complaint   Patient presents with    Alcohol Intoxication     BIBA, stated they drank 9-10 shots of Wysox at their group home. Multiple emesis on route.      HPI  Prakash Prasad is a 33 year old adult who Has medical history of schizoaffective disorder, PTSD, bipolar 1, borderline personality disorder presenting with alcohol intoxication.  Patient drank around 10 shots of whiskey.  He also reports taking 9,000 to 12,000 mg of gabapentin.  The patient reports this was to get high.  Patient also reports marijuana and mushroom use.  Denies any pain but is nauseous and has been vomiting.    Past Medical History  Past Medical History:   Diagnosis Date    ADHD (attention deficit hyperactivity disorder)     Bipolar 1 disorder     Borderline personality disorder     Cauda equina syndrome     Chronic low back pain     Depression     Diabetes mellitus, type 2 (H) 1/19/2023    GERD (gastroesophageal reflux disease)     h/o TBI (traumatic brain injury)     Hypertension, unspecified type 12/16/2021    Marginal corneal ulcer, left 07/17/2015    Nephrolithiasis     obesity     Polysubstance abuse - methamphetamine, hallucinagen, heroin, marijuana     currently in remission    PONV (postoperative nausea and vomiting)     PTSD (post-traumatic stress disorder)      Past Surgical History:   Procedure Laterality Date    BACK SURGERY  12/24/2016    BACK SURGERY - For Cauda Equina Syndrome  12/24/2016    COLONOSCOPY      COMBINED CYSTOSCOPY, INSERT STENT URETER(S) Left 8/30/2018    Procedure: COMBINED CYSTOSCOPY, INSERT STENT URETER(S);  Cystoscopy With Left Stent Placement;  Surgeon: Kiran Ulrich MD;  Location: [facility] OR    COMBINED CYSTOSCOPY, RETROGRADES, EXCHANGE STENT URETER(S) Left 10/14/2018    Procedure: COMBINED CYSTOSCOPY, RETROGRADES, EXCHANGE STENT URETER(S);  Cystoscopy and removal of left-sided stent.;  Surgeon: Stiven Olivo  MD Sunil;  Location:  OR    COMBINED CYSTOSCOPY, RETROGRADES, URETEROSCOPY, INSERT STENT  1/6/2014    Procedure: COMBINED CYSTOSCOPY, RETROGRADES, URETEROSCOPY, INSERT STENT;  Cystyoscopy place left ureteral stent;  Surgeon: Jaun Kimble MD;  Location: [facility] OR    COMBINED CYSTOSCOPY, RETROGRADES, URETEROSCOPY, INSERT STENT Left 10/23/2018    Procedure: Video cystoscopy, left ureteroscopy with stone extraction;  Surgeon: Bull Mast MD;  Location:  OR    CYSTOSCOPY, URETEROSCOPY, COMBINED Right 9/23/2015    Procedure: COMBINED CYSTOSCOPY, URETEROSCOPY;  Surgeon: ROME Jett MD;  Location: [facility] OR    ENT SURGERY      ESOPHAGOSCOPY, GASTROSCOPY, DUODENOSCOPY (EGD), COMBINED  4/8/2013    Procedure: COMBINED ESOPHAGOSCOPY, GASTROSCOPY, DUODENOSCOPY (EGD), BIOPSY SINGLE OR MULTIPLE;  Gastroscopy;  Surgeon: Peter Pickard MD;  Location: [facility] GI    ESOPHAGOSCOPY, GASTROSCOPY, DUODENOSCOPY (EGD), COMBINED Left 10/28/2014    Procedure: COMBINED ESOPHAGOSCOPY, GASTROSCOPY, DUODENOSCOPY (EGD), BIOPSY SINGLE OR MULTIPLE;  Surgeon: Nracisa Ramirez MD;  Location:  OR    ESOPHAGOSCOPY, GASTROSCOPY, DUODENOSCOPY (EGD), COMBINED N/A 12/24/2018    Procedure: COMBINED ESOPHAGOSCOPY, GASTROSCOPY, DUODENOSCOPY (EGD), BIOPSY SINGLE OR MULTIPLE;  Surgeon: Sonu Verduzco MD;  Location: [facility] GI    INJECT EPIDURAL TRANSFORAMINAL LUMBAR / SACRAL EA ADDITIONAL LEVEL Left 3/18/2021    Procedure: Left L5/S1 transforaminal injection for selective L5 nerve root block;  Surgeon: Eliza Pagan MD;  Location: [facility] OR    LAPAROSCOPIC CHOLECYSTECTOMY  11/20/2014    [facility], Dr. Ramirez    LASER HOLMIUM LITHOTRIPSY URETER(S), INSERT STENT, COMBINED  4/2/2014    Procedure: COMBINED CYSTOSCOPY, URETEROSCOPY, LASER HOLMIUM LITHOTRIPSY URETER(S), INSERT STENT;  Cystoscopy,Left Ureteral Stent Removal,Left Ureteroscopy with Laser Lithotripsy,Left Ureter Stent Placement;  Surgeon: ROME Jett MD;  Location: [facility]     Transurethral stone resection  03/11/2011     ACE/ARB/ARNI NOT PRESCRIBED (INTENTIONAL)  amLODIPine (NORVASC) 10 MG tablet  amphetamine-dextroamphetamine (ADDERALL XR) 15 MG 24 hr capsule  ARIPiprazole (ABILIFY) 5 MG tablet  artificial saliva (BIOTENE DRY MOUTHWASH) LIQD liquid  benzoyl peroxide 5 % external liquid  blood glucose (NO BRAND SPECIFIED) test strip  clindamycin (CLEOCIN T) 1 % external lotion  clonazePAM (KLONOPIN) 0.5 MG tablet  clonazePAM (KLONOPIN) 0.5 MG tablet  deutetrabenazine (AUSTEDO) 6 MG tablet  doxycycline hyclate (VIBRAMYCIN) 100 MG capsule  empagliflozin (JARDIANCE) 10 MG TABS tablet  fish oil-omega-3 fatty acids 1000 MG capsule  fluPHENAZine (PROLIXIN) 1 MG tablet  fluPHENAZine (PROLIXIN) 5 MG tablet  fluPHENAZine decanoate (PROLIXIN) 25 MG/ML injection  hydrOXYzine HCl (ATARAX) 25 MG tablet  insulin glargine (LANTUS PEN) 100 UNIT/ML pen  insulin pen needle (BD SAMANTHA U/F) 32G X 4 MM miscellaneous  Lidocaine (LIDOCARE) 4 % Patch  lithium (ESKALITH CR/LITHOBID) 450 MG CR tablet  magnesium gluconate (MAGONATE) 500 MG tablet  Multiple Vitamins-Minerals (ZINC PO)  naproxen (NAPROSYN) 500 MG tablet  nicotine (NICODERM CQ) 21 MG/24HR 24 hr patch  OLANZapine zydis (ZYPREXA) 10 MG ODT  ondansetron (ZOFRAN) 4 MG tablet  PARoxetine (PAXIL) 10 MG tablet  polyethylene glycol (MIRALAX) 17 GM/Dose powder  QUEtiapine (SEROQUEL) 200 MG tablet  QUEtiapine (SEROQUEL) 25 MG tablet  QUEtiapine (SEROQUEL) 50 MG tablet  rosuvastatin (CRESTOR) 40 MG tablet  Semaglutide (RYBELSUS) 7 MG tablet  testosterone (ANDROGEL/TESTIM) 50 MG/5GM (1%) topical gel  thin (NO BRAND SPECIFIED) lancets  Turmeric 500 MG CAPS      Allergies   Allergen Reactions    Haldol [Haloperidol] Other (See Comments)     Makes patient very angry and anxious    Adhesive Tape Hives    Percocet [Oxycodone-Acetaminophen] Nausea and Vomiting    Prednisone Other (See Comments) and Hives     Suicidal ideation    Risperidone Other (See Comments)     "Tramadol Hcl Nausea and Vomiting    Droperidol Anxiety    Seroquel [Quetiapine] Palpitations     Spent 2 weeks in the hospital due to having seroquel, caused palpitations and QT prolongation     Family History  Family History   Problem Relation Age of Onset    Hyperlipidemia Mother     Mental Illness Mother     Anxiety Disorder Mother     Anesthesia Reaction Mother         Vomiting/Nausea    Other Cancer Mother     Skin Cancer Mother     Gastrointestinal Disease Father         Crohn's Disease    Cancer Father         Liver Cancer    Other Cancer Father         Liver    Obesity Father     Skin Cancer Father     No Known Problems Sister     Hypertension Brother     Other Cancer Brother         Esophagecial    Diabetes Brother     Obesity Brother     Hypertension Brother     Other Cancer Brother     Cancer Maternal Grandmother         lympoma    Diabetes Maternal Grandmother     Mental Illness Maternal Grandmother     Other Cancer Maternal Grandmother         Non Hodgkins Lymphoma    Diabetes Maternal Grandfather     Hypertension Maternal Grandfather     Prostate Cancer Maternal Grandfather     Hyperlipidemia Maternal Grandfather     Heart Disease Paternal Grandfather         heart disease    Other Cancer Paternal Half-Brother      Social History   Social History     Tobacco Use    Smoking status: Former     Current packs/day: 0.00     Average packs/day: 0.5 packs/day for 11.1 years (5.6 ttl pk-yrs)     Types: Other, Cigarettes     Start date: 2011     Quit date: 2022     Years since quittin.5     Passive exposure: Never    Smokeless tobacco: Former     Types: Chew     Quit date: 2019    Tobacco comments:     Just nicotine gum currently. 23   Vaping Use    Vaping status: Former    Substances: Nicotine, Flavoring    Devices: Ref[employer]ko USAble tank   Substance Use Topics    Alcohol use: No    Drug use: Not Currently     Types: Marijuana, Opiates     Comment: denies using oxy or other opiates_\"not for " "years\"         A medically appropriate review of systems was performed with pertinent positives and negatives noted in the HPI, and all other systems negative.    Physical Exam   BP: 124/80  Pulse: 98  Temp: 97.4  F (36.3  C)  Resp: 16  SpO2: 94 %  Physical Exam  Physical Exam   Constitutional: oriented to person, place, and time. appears well-developed and well-nourished.   HENT:   Head: Normocephalic and atraumatic.   Neck: Normal range of motion.   Pulmonary/Chest: Effort normal. No respiratory distress.   Cardiac: No murmurs, rubs, gallops. RRR.  Abdominal: Abdomen soft, nontender, nondistended. No rebound tenderness.  MSK: Long bones without deformity or evidence of trauma  Neurological: alert and oriented to person, place, and time.  No clonus.  No tremors.  No tongue fasciculations.  Skin: Skin is warm and dry.   Psychiatric:  normal mood and affect.  behavior is normal. Thought content normal.       ED Course, Procedures, & Data      Procedures            EKG Interpretation:      Interpreted by Marv Godfrey MD  Time reviewed: 0305  Symptoms at time of EKG: ingestion   Rhythm: atrial fibrillation - rapid  Rate: Tachycardia  Axis: Normal  Ectopy: none  Conduction: normal  ST Segments/ T Waves: No acute ischemic changes  Q Waves: none  Comparison to prior: afib with rvr now present    Clinical Impression: afib with rvr now present                No results found for any visits on 04/19/24.  Medications   sodium chloride 0.9% BOLUS 1,000 mL (has no administration in time range)   ondansetron (ZOFRAN) injection 4 mg (has no administration in time range)     Labs Ordered and Resulted from Time of ED Arrival to Time of ED Departure - No data to display  No orders to display          Critical care was performed.   Critical Care Addendum  My initial assessment, based on my review of vital signs, focused history, physical exam, and discussion with poison control , established a high suspicion that Prakash Prasad " has altered mental status and ingestion , which requires immediate intervention, and therefore he is critically ill.     After the initial assessment, the care team initiated medication therapy with IV fluids, metoprolol  to provide stabilization care. Due to the critical nature of this patient, I reassessed vital signs, physical exam, and mental status multiple times prior to his disposition.     Time also spent performing documentation, reviewing test results, and discussion with consultants.     Critical care time (excluding teaching time and procedures): 60 minutes.     Assessment & Plan    MDM  Patient presenting with alcohol intoxication and ingestion of gabapentin, mushrooms and marijuana.  Here the patient is somnolent but arousable.  He is protecting his airway.  He was in atrial fibrillation with RVR which is new for this patient.  I did give him 5 mg of metoprolol which did slow his rate down into the normal range.  He does have a [id number] score of 2 which means he should be on anticoagulants.  I will send apixaban and a small dose of metoprolol to his pharmacy.  I will make a referral to cardiology.  Discussed risks and benefits of anticoagulants with this patient, he understands.    Regarding his ingestion I will have mental health assessment see the patient as he is from a group home before discharge.  The patient will be sent to the morning provider.    I have reviewed the nursing notes. I have reviewed the findings, diagnosis, plan and need for follow up with the patient.    New Prescriptions    APIXABAN ANTICOAGULANT (ELIQUIS) 5 MG TABLET    Take 1 tablet (5 mg) by mouth 2 times daily for 14 days    METOPROLOL SUCCINATE ER (TOPROL XL) 25 MG 24 HR TABLET    Take 1 tablet (25 mg) by mouth daily for 14 days       Final diagnoses:   Atrial fibrillation with RVR (H)   Alcoholic intoxication without complication (H24)   Gabapentin overdose, accidental or unintentional, initial encounter       Marv  JERE Godfrey  [facility] EMERGENCY DEPARTMENT  4/19/2024     Marv Godfrey MD  04/19/24 0736

## 2024-04-19 NOTE — ED NOTES
Pt in total states to have taken 10-12 shots of Salix, 9,000-12,000 gabapentin, mushrooms, and weed. Pt stated this was not an attempt to harm themselves, it was an attempt to get high.

## 2024-04-19 NOTE — TELEPHONE ENCOUNTER
Called 3rd time LVM for Cristy Mcgee CM. Called and LVM, Nadine Christensen, clinical supervisor. Need treatment update and GH manager concern. Regine Last, CNP, 4/19/2024

## 2024-04-19 NOTE — ED NOTES
Bed: [facility]B  Expected date: 4/19/24  Expected time: 1:49 AM  Means of arrival:   Comments:  [id number]  36 F  Etoh/From GH

## 2024-04-19 NOTE — TELEPHONE ENCOUNTER
Left VM to CM and GH manager to request care conference possibly today at 1030, 1 or 1:30. Schedule held for this. Pt also requested PCP for psych provider change. Will recommend [facility] psych provider as he has immediate opening to avoid gap in care. Regine Last CNP, 4/19/2024

## 2024-04-19 NOTE — CONSULTS
"Diagnostic Evaluation Consultation  Crisis Assessment    Patient Name: Prakash Prasad  Age:  33 year old  Legal Sex: female  Gender Identity: transgender male  Pronouns: he/him/his  Race: White  Ethnicity: Not  or   Language: English      Patient was assessed: In person      Patient location: [facility] EMERGENCY DEPARTMENT                             [id number]    Referral Data and Chief Complaint  Prakash Prasad presents to the ED via EMS. Patient is presenting to the ED for the following concerns: Substance use, Intoxication.   Factors that make the mental health crisis life threatening or complex are:  The pt was BIBA from his group home due to intoxication. The pt ingested [id number] of gabapentin, shrooms, marijuana, and 9-12 shots of whiskey. The pt states that this was not a suicide attempt, or even an intentional act of self harm, rather it was an attempt to \"just get super high\". The pt states that has struggled with substance use previously but that this episode of use was triggered by recent stressors. The pt reports he was recently IPMH at [facility] and was discharged 4/12/24, the transition back to the group home was stressful. The pt also reports that he recently broke up with his significant other and he had a disagreement with his psychiatrist, so he needs to find a new provider.      Informed Consent and Assessment Methods  Explained the crisis assessment process, including applicable information disclosures and limits to confidentiality, assessed understanding of the process, and obtained consent to proceed with the assessment.  Assessment methods included conducting a formal interview with patient, review of medical records, collaboration with medical staff, and obtaining relevant collateral information from family and community providers when available: done     Patient response to interventions: acceptance expressed, verbalizes understanding  Coping skills were attempted to " reduce the crisis:  Pt reports that he did not utilize any coping skills during the crisis. The writer engaged the pt in disposition and safety planning.     History of the Crisis   The pt has a diagnosis history notable for Schizoaffective disorder- bipolar type, Borderline Personality disorder, AVA, PTSD, ADHD, and polysubstance abuse (THC, methamphetamine, opioids). Pt is under MI commitment through [facility], provisional discharge was revoked on 10/31, due to medication noncompliance and increased psychosis symptoms.  Commitment was renewed on 2/04/24, pt is currently provisionally discharged. Pt was last seen by his psychiatrist on 4/16, at that appointment his provider addressed oversedation due to his Seroquel, the pt will discontinue the Seroquel AM dose but has not stopped his AM and PM doses yet. The pt was also advised by his psychiatrist that the Adderall prescription will be discontinued due to the pt's increased impulsivity. The pt did not agree with this recommendation and per collateral from the RN, Deena, from the psychiatrists office, the provider was expecting a behavioral response due to the pt being frustrated about the discontinuation of the Adderall. The pt denies HI/SI/SIB, the pt denies visual hallucinations, denies delusions, pt endorses auditory hallucinations but reports this is a chronic, baseline symptom and not command in nature at this time.    Brief Psychosocial History  Family:  Single, Children no  Support System:  Facility resident(s)/Staff  Employment Status:  unemployed  Source of Income:  disability  Financial Environmental Concerns:  unemployed  Current Hobbies:  television/movies/videos  Barriers in Personal Life:  mental health concerns    Significant Clinical History  Current Anxiety Symptoms:     Current Depression/Trauma:  apathy, impaired decision making  Current Somatic Symptoms:  somatic symptoms (abdominal pain, headache, tension)  Current Psychosis/Thought  Disturbance:  auditory hallucinations, impulsive  Current Eating Symptoms:     Chemical Use History:  Alcohol: Binge  Last Use:: 04/18/24  Benzodiazepines: None  Opiates: None  Cocaine: None  Marijuana: Daily  Last Use:: 04/18/24  Other Use: Other (comments) (hallucinogenic drug- shrooms)  Last Use:: 04/18/24   Past diagnosis:  ADHD, Anxiety Disorder, Personality Disorder, Schizophrenia, Bipolar Disorder, Depression, PTSD, Substance Use Disorder  Family history:  No known history of mental health or chemical health concerns  Past treatment:  Individual therapy, Case management, Civil Commitment, Primary Care, Psychiatric Medication Management, Residential Treatment, Inpatient Hospitalization, Supportive Living Environment (group home, [facility] house, etc)  Details of most recent treatment:  Pt was last IPMH at [facility] station 30 from 3/35-4/12/24, Pt resides at [facility], which is where he has lived for the last 3 years. Pt has a medication provider [email or web address] Regine Last CNP, and the pt has a history of medication noncompliance. The pt's group home does not administer the pt's medications. Pt is under civil commitment through [facility], is under provisional discharge. The pt is scheduled to start DBT DT at [facility] and a KIERRA tx program at [facility], both begin next week. Pt has a , Cristy Mcgee  Resources [phone].  Other relevant history:   NA       Collateral Information  Is there collateral information: Yes     Collateral information name, relationship, phone number:  SRINIVAS Shaffer at [facility] [phone]    What happened today: Deena stated the pt was seen by their psych provider, Regine VILLEGAS, on 4/16. At that appt the provider addressed oversedation concerns and instructed the pt to discontinue their morning Seroquel dose. The provider also advised the pt that they would not renew the pt's Adderall prescription. The provider anticipated a behavioral response  due to the pt's frustration about the Adderall, pt requested to be transferred to a new medication provider during that visit. Pt's PCP did a new referral for a new provider.     What is different about patient's functioning: Pt has been displaying increased impulsivity. Pt's provider indicated that the impulsivity increase may be due to the Adderall, which is why the medication is being discontinued.     Concern about alcohol/drug use:  Yes    What do you think the patient needs:  KIERRA tx     Has patient made comments about wanting to kill themselves/others: no    If d/c is recommended, can they take part in safety/aftercare planning:  no    Additional collateral information:   Domenica, [facility] owner [phone]. Domenica stated that the  has taken over administering the pt's meds. Domenica reported that she intends to call [facility], pt's pharmacy, to do a medication review and make sure they have all the pertinent information needed due to the pt's recent IP admission and the pt's medication changes made at his med mgmt appt on 4/16.  is agreeable with the pt returning home,  will partner with pt's med provider and  for a CARE meeting hopefully later today.      Risk Assessment  Sutton Suicide Severity Rating Scale Full Clinical Version:       [place] Suicide Severity Rating Scale Recent: 4/19/24  Suicidal Ideation (Recent)  Q1 Wished to be Dead (Past Month): yes  Q2 Suicidal Thoughts (Past Month): yes  Q3 Suicidal Thought Method: no  Q4 Suicidal Intent without Specific Plan: no  Q5 Suicide Intent with Specific Plan: no  Level of Risk per Screen: low risk  Intensity of Ideation (Recent)  Most Severe Ideation Rating (Past 1 Month): 2  Frequency (Past 1 Month): Less than once a week  Duration (Past 1 Month): Fleeting, few seconds or minutes  Controllability (Past 1 Month): Can control thoughts with little difficulty  Deterrents (Past 1 Month): Deterrents definitely stopped you from attempting suicide  Reasons for  Ideation (Past 1 Month): Mostly to end or stop the pain (You couldn't go on living with the pain or how you were feeling)  Suicidal Behavior (Recent)  Actual Attempt (Past 3 Months): No  Has subject engaged in non-suicidal self-injurious behavior? (Past 3 Months): No  Aborted or Self-Interrupted Attempt (Past 3 Months): No  Preparatory Acts or Behavior (Past 3 Months): No    Environmental or Psychosocial Events: challenging interpersonal relationships, other life stressors, unemployment/underemployment, history of TBI  Protective Factors: Protective Factors: strong bond to family unit, community support, or employment, lives in a responsibly safe and stable environment, help seeking, able to access care without barriers, supportive ongoing medical and mental health care relationships    Does the patient have thoughts of harming others? Feels Like Hurting Others: no  Previous Attempt to Hurt Others: no  Current presentation:  (Cooperative and calm)  Is the patient engaging in sexually inappropriate behavior?: no    Is the patient engaging in sexually inappropriate behavior?  no        Mental Status Exam   Affect: Appropriate  Appearance: Appropriate  Attention Span/Concentration: Attentive  Eye Contact: Engaged    Fund of Knowledge: Appropriate   Language /Speech Content: Fluent  Language /Speech Volume: Normal  Language /Speech Rate/Productions: Normal  Recent Memory: Intact  Remote Memory: Intact  Mood: Normal  Orientation to Person: Yes   Orientation to Place: Yes  Orientation to Time of Day: Yes  Orientation to Date: Yes     Situation (Do they understand why they are here?): Yes  Psychomotor Behavior: Normal  Thought Content: Clear, Hallucinations (Baseline auditory hallucinations)  Thought Form: Intact      Medication  Psychotropic medications:   Medication Orders - Psychiatric (From admission, onward)      None             Current Care Team  Patient Care Team:  Becki Avery APRN CNP as PCP - General  (Family Medicine)  Kathie Sandhu MD as MD (Neurology)  Tessa Galvan, RN as Specialty Care Coordinator  Ashu Russell DO as MD (Psychiatry & Neurology - Neurology)  Becki Avery APRN CNP as Assigned PCP  Desmond West as Specialty Care Coordinator (Plastic Surgery)  Harvey Negron MD as MD (Psychiatry)  Dora Mazariegos, PhD (Student in organized health care education/training program)  Glenda Juan MD as Resident (Family Medicine)  Ayana  as   Regine Last APRN CNP as Nurse Practitioner (Psychiatry)  Oswaldo Amado MD as MD (Dermatology)  Regine Last APRN CNP as Assigned Behavioral Health Provider  Mark Cleary MD as MD (Dermatology)  Lucie Chan, RN as Specialty Care Coordinator (Psychiatry)  Ashu Russell DO as MD (Neurology)  Luz Moncada APRN CNP as Assigned Neuroscience Provider  Alice Tubbs [facility] as Pharmacist (Pharmacist)  Mark Cleary MD as Assigned Surgical Provider  Moriah Rojas [facility] as Pharmacist (Pharmacist)  Moriah Rojas [facility] as Assigned MTM Pharmacist  Leventhal, Thomas Michael, MD as MD (Gastroenterology)  Mark Cleary MD as MD (Dermatology)  Ofelia Bess PA-C as Physician Assistant (Neurological Surgery)  Sydni Hawkins BSW as Lead Care Coordinator (Primary Care - CC)  Espinoza Jimenez MD as MD (Cardiovascular Disease)    Diagnosis  Patient Active Problem List   Diagnosis    ADHD (attention deficit hyperactivity disorder)    Bipolar 1 disorder, manic, mild    Marijuana abuse    Polysubstance abuse (H)    GERD (gastroesophageal reflux disease)    Tobacco abuse    Intractable back pain    Optic neuritis    Cauda equina syndrome with neurogenic bladder (H)    Schizoaffective disorder, bipolar type (H)    PTSD (post-traumatic stress disorder)    Anxiety    Auditory hallucination    Nephrolithiasis    Cyst of  "left ovary    Borderline personality disorder (H)    Cannabis use disorder, severe, dependence (H)    Depression    Episodic mood disorder (H24)    History of heroin abuse (H)    Moderate episode of recurrent major depressive disorder (H)    Opioid use disorder, severe, dependence (H)    Substance-induced psychotic disorder with hallucinations (H)    Nausea    Urinary retention    Chronic bilateral low back pain without sciatica    AVA (generalized anxiety disorder)    Aggressive behavior    Gender identity disorder    Lumbosacral radiculopathy at L5    DUB (dysfunctional uterine bleeding)    Seizure-like activity (H)    Acanthosis nigricans    Schizoaffective disorder, chronic condition with acute exacerbation (H)    Bipolar affective disorder, mixed, severe, with psychotic behavior (H)    Schizophrenia, schizoaffective, chronic with acute exacerbation (H)    Akathisia    Hypertension, unspecified type    Female-to-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 person    Morbid obesity (H)    Diabetes mellitus, type 2 (H)    Mood disorder due to a general medical condition    Pain of right hand    Acne vulgaris       Primary Problem This Admission  Active Hospital Problems    Borderline personality disorder (H)      Polysubstance abuse (H)      Bipolar 1 disorder, manic, mild    Borderline personality disorder (H) F60.3  Polysubstance abuse (H) F19.10  Bipolar 1 disorder, manic, mild F31.11    Clinical Summary and Substantiation of Recommendations   The pt was BIBA from his group home due to intoxication. The pt ingested [id number] of gabapentin, shrooms, marijuana, and 9-12 shots of whiskey. The pt states that this was not a suicide attempt, or even an intentional act of self harm, rather it was an attempt to \"just get super high\". The pt states that has struggled with substance use previously but that this episode of use was triggered by recent stressors. The pt reports he was recently [facility] and was discharged 4/12/24, the transition " back to the group home was stressful. The pt also reports that he recently broke up with his significant other and he had a disagreement with his psychiatrist, so he needs to find a new provider. Pt was last [facility] at [facility] station 30 from 3/35-4/12/24, Pt resides at [facility], which is where he has lived for the last 3 years. Pt has a medication provider at [facility] psych [facility] Regine Last CNP, and the pt has a history of medication noncompliance. The pt's group home has taken over administering the pt's medications, but per the pt's group home owner Domenica, she will contact the pt's pharmacy, [facility], to go through a medication review due to the pt's hx of noncompliance. Pt is under civil commitment through [facility], is under provisional discharge. Recommended disposition, attending Dr. Beckham is in agreement, is discharge, the pt's behavioral and mental health symptoms are chronic, pt's AH are at baseline, pt denies SI/SIB/HI. The pt is scheduled to start DBT DT at [facility] and a KIERRA tx program at [facility], both scheduled when the pt discharged from [facility] on 4/12/24 and each program begins next week. Pt will discharge back to  with a referral for psychiatry and a referral for the [facility]. Pt and  manager advised that the pt should abstain from all mood altering substances, if the pt's symptoms worsen the pt/GH should contact [facility] or return to the ED.      Patient coping skills attempted to reduce the crisis:  Pt reports that he did not utilize any coping skills during the crisis. The writer engaged the pt in disposition and safety planning.    Disposition  Recommended disposition: Medication Management        Reviewed case and recommendations with attending provider. Attending Name: Dr. Beckham       Attending concurs with disposition: yes       Patient and/or validated legal guardian concurs with disposition:   yes       Final disposition:  discharge    Assessment Details   Total  duration spent with the patient: 35 min     CPT code(s) utilized: 52987 - Psychotherapy for Crisis - 60 (30-74*) min    NATHAN SIMON, Psychotherapist  [facility] - Triage & Transition Services  Callback: [phone]

## 2024-04-19 NOTE — TELEPHONE ENCOUNTER
Writer called Ayana back to update. Ayana has not been able to reach  yet. Ayana states they scheduled patient with medication management in case he chooses to stop seeing Regine. They also scheduled him with Vance at [facility]. Ayana also states she spoke with Domenica about the referrals. Domenica is going to follow up with Abdirashid to go over a complete med review. Apparently patient had been attempting to refill through a pharmacy he was not authorized through and it was denied, so they are wondering if he was able to secure a refill elsewhere. Ayana reports that Domenica will be checking through everything to  ensure they are on the same page. Patient reported he still has some Nadir in the closet, so group home will go through and ensure everything is removed. Group home will plan to utilize [facility] or bring patient back to the ER if there are any issues.

## 2024-04-19 NOTE — DISCHARGE INSTRUCTIONS
Please make an appointment to follow up with Your Primary Care Provider and Cardiology Clinic (phone: [phone]) as soon as possible.    You do have atrial fibrillation at this point which is an abnormal heart rhythm.  You will need to be placed on a medication called metoprolol to help control your heart rate and blood thinners to avoid blood clots and complications such as stroke.  He will need to take these medications at least until you see cardiology.    You cannot take naproxen, aspirin, ibuprofen or other NSAID medications while taking the apixaban which is the blood thinner.      Scheduled Appointment  Type: Telepsychiatry  Date: Tuesday, 4/23/2024  Time: 1:00 pm - 2:00 pm  Provider: Tavia WALLACE  CNP,PMHNP  Location: [address] [address]  Phone: [phone]  Patient Instructions  Before your appointment, you must speak with our Intake Department. Our intake team will attempt to contact you. If you do not hear from them within 24 hours, please call them at [phone] and tell them you are a [facility] referral. If you do not speak with our Intake Department and complete the necessary paperwork they send you, we cannot see you at your scheduled appointment time.      It is your responsibility to contact your insurance company directly to verify coverage, eligibility, payment, and benefit information for any appointments or referrals listed above.    [facility] maintains an extensive network of licensed behavioral health providers to connect patients with the services they need. We do not charge providers a fee to participate in our referral network. We match patients with providers based on a patient's specific treatment needs, insurance coverage, and location. Our first effort will be to refer you to a provider within your care system and will utilize providers outside your care system as needed.

## 2024-04-22 ENCOUNTER — PATIENT OUTREACH (OUTPATIENT)
Dept: CARE COORDINATION | Facility: CLINIC | Age: 34
End: 2024-04-22

## 2024-04-22 ENCOUNTER — OFFICE VISIT (OUTPATIENT)
Dept: CARDIOLOGY | Facility: CLINIC | Age: 34
End: 2024-04-22
Attending: EMERGENCY MEDICINE
Payer: MEDICARE

## 2024-04-22 VITALS
WEIGHT: 224.6 LBS | BODY MASS INDEX: 36.1 KG/M2 | DIASTOLIC BLOOD PRESSURE: 85 MMHG | HEART RATE: 102 BPM | SYSTOLIC BLOOD PRESSURE: 119 MMHG | HEIGHT: 66 IN

## 2024-04-22 DIAGNOSIS — F31.64 BIPOLAR AFFECTIVE DISORDER, MIXED, SEVERE, WITH PSYCHOTIC BEHAVIOR (H): ICD-10-CM

## 2024-04-22 DIAGNOSIS — F25.9 SCHIZOAFFECTIVE DISORDER, CHRONIC CONDITION WITH ACUTE EXACERBATION (H): Primary | ICD-10-CM

## 2024-04-22 DIAGNOSIS — I48.91 ATRIAL FIBRILLATION WITH RVR (H): ICD-10-CM

## 2024-04-22 DIAGNOSIS — F60.3 BORDERLINE PERSONALITY DISORDER (H): ICD-10-CM

## 2024-04-22 PROCEDURE — 99204 OFFICE O/P NEW MOD 45 MIN: CPT | Performed by: INTERNAL MEDICINE

## 2024-04-22 ASSESSMENT — ACTIVITIES OF DAILY LIVING (ADL): DEPENDENT_IADLS:: INDEPENDENT

## 2024-04-22 NOTE — LETTER
[facility] CARE COORDINATION  [address] [place] [address]     April 29, 2024    Prakash AMIN Shanice  [address] [address]      Dear Prakash,    I am a clinic care coordinator who works with BROOKLYN Cruz CNP with the [facility]. I wanted to introduce myself and provide you with my contact information for you to be able to call me with any questions or concerns. Below is a description of clinic care coordination and how I can further assist you.       The clinic care coordination team is made up of a registered nurse, , financial resource worker and community health worker who understand the health care system. The goal of clinic care coordination is to help you manage your health and improve access to the health care system. Our team works alongside your provider to assist you in determining your health and social needs. We can help you obtain health care and community resources, providing you with necessary information and education. We can work with you through any barriers and develop a care plan that helps coordinate and strengthen the communication between you and your care team.  Our services are voluntary and are offered without charge to you personally.    Please feel free to contact me with any questions or concerns regarding care coordination and what we can offer.      We are focused on providing you with the highest-quality healthcare experience possible.    Sincerely,     Sydni Hawkins, RAYMUNDO, MSW Clinic   [facility]  Care Coordination  [facility]   [email or web address]  Office: [phone]  Employed by [employer]

## 2024-04-22 NOTE — PROGRESS NOTES
Clinic Care Coordination Contact    Situation: Patient chart reviewed by care coordinator.    Background: Patient was at [facility] on 4/19/2024 for alcohol intoxication     Assessment: Per ED note, patient dx's include schizoaffective disorder, PTSD, Bipolar D/O and BPD.  Patient presented with alcohol intoxication, mushroom, marijuana and gabapentin use.  Patient under a mental illness commitment but was released on a provisional discharge.  Patient lives in group home, is followed by a psychiatrist and has a [facility] .  The /owner is Domenica.  Per ED notes, patient is to follow up with cardiology, this appt was today.  Patient has been scheduled for a sexual wellness visit and is to follow up with outpatient substance use treatment.  Patient was discharged with prescriptions for metoprolol, apixban.  Care team has been in contact with patient's  mekhi johansen her . A tentative plan was noted to patient to reestablish with psychology.      Per chart notes, patient has DBT individual therapist and substance use treatment program at [facility] next week. Patient has not followed up with these appts in the past     Patient has scheduled psychiatry appt on 4/23/2024 and psychological testing 5/1/2024.  Plan/Recommendations: SW will not intervene as patient has established care team in place     Sydni Hawkins, NICOLW, MSW   [facility]  Care Coordination  [facility]  [phone]  4/22/2024 4:10 PM

## 2024-04-22 NOTE — PATIENT INSTRUCTIONS
Echocardiogram  Will schedule an echocardiogram, or ultrasound of the heart.  This looks at the size and function of the heart and valves.  It generally takes about 20-30 minutes.  This will tell if there is any reduced heart function or problem with any of the valves.      Matao freddie  Will order a Zio monitor.  This is a heart monitor that continuously records all your heartbeats.  If you have any symptoms, there is a button you can push that records when you had your symptoms.  The monitor is returned to the company in the mail and the report is sent to us.  This will show what your average heart rate is and if there are any rhythm issues with the heart including when you may have reported any symptoms.  You are encouraged to do all your regular activities while wearing this monitor.

## 2024-04-22 NOTE — LETTER
4/22/2024    Becki Avery, APRN CNP  [id number] [address] [facility]  [place] [address]    RE: Prakash Prasad       Dear Colleague,     I had the pleasure of seeing Prakash Prasad in the [facility].  CARDIOLOGY CONSULT    REASON FOR CONSULT: Atrial fibrillation    PRIMARY CARE PHYSICIAN:  Becki Avery    HISTORY OF PRESENT ILLNESS:  33-year-old male seen for atrial fibrillation.  He has schizoaffective disorder, PTSD, bipolar.    He denies any cardiac issues or recent episodes of chest pain or palpitations.  He does not use alcohol regularly, only occasionally.    March 19 he presented to the ED after taking apparently 10 shots of whiskey and up to 12,000 mg of gabapentin.  He was in rapid A-fib.  Metoprolol and Eliquis were started.    He now feels back to baseline.  He has not checked heart rate at home.  He has been compliant with his metoprolol and Eliquis over the past few days.  He has no lightheadedness, dizziness, or chest pain.    PAST MEDICAL HISTORY:  Past Medical History:   Diagnosis Date    ADHD (attention deficit hyperactivity disorder)     Bipolar 1 disorder     Borderline personality disorder     Cauda equina syndrome     Chronic low back pain     Depression     Diabetes mellitus, type 2 (H) 1/19/2023    GERD (gastroesophageal reflux disease)     h/o TBI (traumatic brain injury)     Hypertension, unspecified type 12/16/2021    Marginal corneal ulcer, left 07/17/2015    Nephrolithiasis     obesity     Polysubstance abuse - methamphetamine, hallucinagen, heroin, marijuana     currently in remission    PONV (postoperative nausea and vomiting)     PTSD (post-traumatic stress disorder)        MEDICATIONS:  Current Outpatient Medications   Medication Sig Dispense Refill    ACE/ARB/ARNI NOT PRESCRIBED (INTENTIONAL) Please choose reason not prescribed from choices below.      amLODIPine (NORVASC) 10 MG tablet Take 1 tablet (10 mg) by mouth daily 90 tablet 1    apixaban ANTICOAGULANT  (ELIQUIS) 5 MG tablet Take 1 tablet (5 mg) by mouth 2 times daily for 14 days 28 tablet 0    ARIPiprazole (ABILIFY) 5 MG tablet Take 1 tablet (5 mg) by mouth every morning for 30 days 30 tablet 0    artificial saliva (BIOTENE DRY MOUTHWASH) LIQD liquid Swish and spit 5-10 mLs in mouth 4 times daily as needed for dry mouth 59 mL 0    benzoyl peroxide 5 % external liquid Apply topically daily 226 g 11    blood glucose (NO BRAND SPECIFIED) test strip Use to test blood sugar one times daily and as needed. To accompany: Blood Glucose Monitor Brands: per insurance. 100 strip 3    clindamycin (CLEOCIN T) 1 % external lotion Apply topically 2 times daily 60 mL 11    clonazePAM (KLONOPIN) 0.5 MG tablet Take 0.5 tablets (0.25 mg) by mouth 2 times daily 30 tablet 0    clonazePAM (KLONOPIN) 0.5 MG tablet Take 0.5 tablets (0.25 mg) by mouth 2 times daily as needed for anxiety 30 tablet 0    deutetrabenazine (AUSTEDO) 6 MG tablet Take 1 tablet (6 mg) by mouth daily 30 tablet 1    doxycycline hyclate (VIBRAMYCIN) 100 MG capsule Take 1 capsule (100 mg) by mouth every 12 hours 60 capsule 11    empagliflozin (JARDIANCE) 10 MG TABS tablet Take 1 tablet (10 mg) by mouth daily +++NEED APPOINTMENT+++ 90 tablet 0    fish oil-omega-3 fatty acids 1000 MG capsule Take 1 capsule (1 g) by mouth daily for 30 days 30 capsule 0    fluPHENAZine (PROLIXIN) 1 MG tablet Take 2 tablets (2 mg) by mouth 2 times daily for 30 days 120 tablet 0    fluPHENAZine (PROLIXIN) 5 MG tablet Take 1 tablet (5 mg) by mouth 2 times daily as needed (agitation, psychosis) 60 tablet 0    fluPHENAZine decanoate (PROLIXIN) 25 MG/ML injection Inject 1 mL (25 mg) into the muscle every 14 days 5 mL 0    hydrOXYzine HCl (ATARAX) 25 MG tablet Take 1 tablet (25 mg) by mouth every 4 hours as needed for anxiety 30 tablet 0    insulin glargine (LANTUS PEN) 100 UNIT/ML pen Inject 25 Units Subcutaneous every morning (before breakfast) 30 mL 1    insulin pen needle (BD SAMANTHA U/F) 32G X  4 MM miscellaneous Use 1 pen needles daily or as directed. 100 each 1    Lidocaine (LIDOCARE) 4 % Patch Place 2 patches onto the skin every 24 hours for 30 days To prevent lidocaine toxicity, patient should be patch free for 12 hrs daily. 60 patch 0    lithium (ESKALITH CR/LITHOBID) 450 MG CR tablet Take 2 tablets (900 mg) by mouth at bedtime for 30 days 60 tablet 0    magnesium gluconate (MAGONATE) 500 MG tablet Take 1 tablet (500 mg) by mouth daily for 30 days 30 tablet 0    metoprolol succinate ER (TOPROL XL) 25 MG 24 hr tablet Take 1 tablet (25 mg) by mouth daily for 14 days 14 tablet 0    Multiple Vitamins-Minerals (ZINC PO) Take 1 tablet by mouth daily      nicotine (NICODERM CQ) 21 MG/24HR 24 hr patch Place 1 patch onto the skin every 24 hours 30 patch 3    OLANZapine zydis (ZYPREXA) 10 MG ODT Take 1 tablet (10 mg) by mouth 3 times daily as needed 20 tablet 0    ondansetron (ZOFRAN) 4 MG tablet Take 1 tablet (4 mg) by mouth every 6 hours as needed for nausea or vomiting 15 tablet 0    PARoxetine (PAXIL) 10 MG tablet Take 1 tablet (10 mg) by mouth daily for 30 days 30 tablet 0    polyethylene glycol (MIRALAX) 17 GM/Dose powder Take 17 g by mouth daily for 30 days 510 g 0    QUEtiapine (SEROQUEL) 200 MG tablet Take 1 tablet (200 mg) by mouth at bedtime 30 tablet 2    QUEtiapine (SEROQUEL) 25 MG tablet Take 1 tablet (25 mg) by mouth every 6 hours as needed 90 tablet 0    QUEtiapine (SEROQUEL) 50 MG tablet Take 1 tablet (50 mg) by mouth at bedtime 30 tablet 1    rosuvastatin (CRESTOR) 40 MG tablet Take 1 tablet (40 mg) by mouth daily 90 tablet 3    Semaglutide (RYBELSUS) 7 MG tablet Take 1 tablet (7 mg) by mouth daily 90 tablet 1    testosterone (ANDROGEL/TESTIM) 50 MG/5GM (1%) topical gel Place 1 packet (50 mg of testosterone) onto the skin daily 30 packet 0    thin (NO BRAND SPECIFIED) lancets Use with lanceting device to check blood sugars once per day. To accompany: Blood Glucose Monitor Brands: per  insurance. 100 each 3    Turmeric 500 MG CAPS Take 1 capsule by mouth daily       No current facility-administered medications for this visit.       ALLERGIES:  Allergies   Allergen Reactions    Haldol [Haloperidol] Other (See Comments)     Makes patient very angry and anxious    Adhesive Tape Hives    Percocet [Oxycodone-Acetaminophen] Nausea and Vomiting    Prednisone Other (See Comments) and Hives     Suicidal ideation    Risperidone Other (See Comments)    Tramadol Hcl Nausea and Vomiting    Droperidol Anxiety    Seroquel [Quetiapine] Palpitations     Spent 2 weeks in the hospital due to having seroquel, caused palpitations and QT prolongation       SOCIAL HISTORY:  I have reviewed this patient's social history and updated it with pertinent information if needed. Prakash Prasad  reports that he quit smoking about 19 months ago. His smoking use included other and cigarettes. He started smoking about 12 years ago. He has a 5.6 pack-year smoking history. He has never been exposed to tobacco smoke. He quit smokeless tobacco use about 4 years ago.  His smokeless tobacco use included chew. He reports that he does not currently use drugs after having used the following drugs: Marijuana and Opiates. He reports that he does not drink alcohol.    FAMILY HISTORY:  I have reviewed this patient's family history and updated it with pertinent information if needed.   Family History   Problem Relation Age of Onset    Hyperlipidemia Mother     Mental Illness Mother     Anxiety Disorder Mother     Anesthesia Reaction Mother         Vomiting/Nausea    Other Cancer Mother     Skin Cancer Mother     Gastrointestinal Disease Father         Crohn's Disease    Cancer Father         Liver Cancer    Other Cancer Father         Liver    Obesity Father     Skin Cancer Father     No Known Problems Sister     Hypertension Brother     Other Cancer Brother         Esophagecial    Diabetes Brother     Obesity Brother     Hypertension Brother   "   Other Cancer Brother     Cancer Maternal Grandmother         lympoma    Diabetes Maternal Grandmother     Mental Illness Maternal Grandmother     Other Cancer Maternal Grandmother         Non Hodgkins Lymphoma    Diabetes Maternal Grandfather     Hypertension Maternal Grandfather     Prostate Cancer Maternal Grandfather     Hyperlipidemia Maternal Grandfather     Heart Disease Paternal Grandfather         heart disease    Other Cancer Paternal Half-Brother        REVIEW OF SYSTEMS:  Constitutional:  No weight loss, fever, chills  HEENT:  Eyes:  No visual loss, blurred vision, double vision or yellow sclerae. No hearing loss, sneezing, congestion, runny nose or sore throat.  Skin:  No rash or itching.  Cardiovascular: per HPI  Respiratory: per HPI  GI:  No anorexia, nausea, vomiting or diarrhea. No abdominal pain or blood.  :  No dysurea, hematuria  Neurologic:  No headache, paralysis, ataxia, numbness or tingling in the extremities. No change in bowel or bladder control.  Musculoskeletal:  No muscle pain  Hematologic:  No bleeding or bruising.  Lymphatics:  No enlarged nodes. No history of splenectomy.  Endocrine:  No reports of sweating, cold or heat intolerance. No polyuria or polydipsia.  Allergies:  No history of asthma, hives, eczema or rhinitis.    PHYSICAL EXAM:  /85   Pulse 102   Ht 1.664 m (5' 5.5\")   Wt 101.9 kg (224 lb 9.6 oz)   BMI 36.81 kg/m    Constitutional: awake, alert, no distress  Eyes: PERRL, sclera nonicteric  ENT: trachea midline  Respiratory: Lungs clear  Cardiovascular: Regular rate and rhythm, no murmurs  GI: nondistended, nontender, bowel sounds present  Lymph/Hematologic: no lymphadenopathy  Skin: dry, no rash  Musculoskeletal: good muscle tone, strength 5/5 in upper and lower extremities  Neurologic: no focal deficits  Neuropsychiatric: appropriate affact    DATA:  Labs:   April 2024: Potassium 3.7, creatinine 0.7  Recent Labs   Lab Test 03/13/24  0944 11/08/23  0746   CHOL " 118 113   HDL 39* 39*   LDL 38 41   TRIG 203* 163*     ASSESSMENT:  33-year-old male seen for atrial fibrillation.  His A-fib was likely precipitated by excessive alcohol use.  He does not use regularly.  He appears to be back in sinus rhythm on exam today.  He has no cardiac symptoms recently otherwise.  Zio monitor will be done to assess rhythm.  Echo will be ordered to look for any underlying structural heart or valve disease.  Assuming these are normal, he could stop Eliquis and metoprolol and about 1 month.    RECOMMENDATIONS:  1.  A-fib, likely secondary to excessive alcohol use  -3-day Zio monitor  -Echo  -Continue metoprolol and Eliquis for now, but okay to stop in about 1 month if testing is normal    Follow-up as needed based on test results.    Espinoza Jimenez MD  Cardiology - [facility]  Pager:  [phone]  Text Page  April 22, 2024        Thank you for allowing me to participate in the care of your patient.      Sincerely,     Espinoza Jimenez MD     [facility]  cc:   Marv Godfrey MD  [address] [place] [address]

## 2024-04-22 NOTE — PROGRESS NOTES
CARDIOLOGY CONSULT    REASON FOR CONSULT: Atrial fibrillation    PRIMARY CARE PHYSICIAN:  Becki Avery    HISTORY OF PRESENT ILLNESS:  33-year-old male seen for atrial fibrillation.  He has schizoaffective disorder, PTSD, bipolar.    He denies any cardiac issues or recent episodes of chest pain or palpitations.  He does not use alcohol regularly, only occasionally.    March 19 he presented to the ED after taking apparently 10 shots of whiskey and up to 12,000 mg of gabapentin.  He was in rapid A-fib.  Metoprolol and Eliquis were started.    He now feels back to baseline.  He has not checked heart rate at home.  He has been compliant with his metoprolol and Eliquis over the past few days.  He has no lightheadedness, dizziness, or chest pain.    PAST MEDICAL HISTORY:  Past Medical History:   Diagnosis Date    ADHD (attention deficit hyperactivity disorder)     Bipolar 1 disorder     Borderline personality disorder     Cauda equina syndrome     Chronic low back pain     Depression     Diabetes mellitus, type 2 (H) 1/19/2023    GERD (gastroesophageal reflux disease)     h/o TBI (traumatic brain injury)     Hypertension, unspecified type 12/16/2021    Marginal corneal ulcer, left 07/17/2015    Nephrolithiasis     obesity     Polysubstance abuse - methamphetamine, hallucinagen, heroin, marijuana     currently in remission    PONV (postoperative nausea and vomiting)     PTSD (post-traumatic stress disorder)        MEDICATIONS:  Current Outpatient Medications   Medication Sig Dispense Refill    ACE/ARB/ARNI NOT PRESCRIBED (INTENTIONAL) Please choose reason not prescribed from choices below.      amLODIPine (NORVASC) 10 MG tablet Take 1 tablet (10 mg) by mouth daily 90 tablet 1    apixaban ANTICOAGULANT (ELIQUIS) 5 MG tablet Take 1 tablet (5 mg) by mouth 2 times daily for 14 days 28 tablet 0    ARIPiprazole (ABILIFY) 5 MG tablet Take 1 tablet (5 mg) by mouth every morning for 30 days 30 tablet 0    artificial saliva  (BIOTENE DRY MOUTHWASH) LIQD liquid Swish and spit 5-10 mLs in mouth 4 times daily as needed for dry mouth 59 mL 0    benzoyl peroxide 5 % external liquid Apply topically daily 226 g 11    blood glucose (NO BRAND SPECIFIED) test strip Use to test blood sugar one times daily and as needed. To accompany: Blood Glucose Monitor Brands: per insurance. 100 strip 3    clindamycin (CLEOCIN T) 1 % external lotion Apply topically 2 times daily 60 mL 11    clonazePAM (KLONOPIN) 0.5 MG tablet Take 0.5 tablets (0.25 mg) by mouth 2 times daily 30 tablet 0    clonazePAM (KLONOPIN) 0.5 MG tablet Take 0.5 tablets (0.25 mg) by mouth 2 times daily as needed for anxiety 30 tablet 0    deutetrabenazine (AUSTEDO) 6 MG tablet Take 1 tablet (6 mg) by mouth daily 30 tablet 1    doxycycline hyclate (VIBRAMYCIN) 100 MG capsule Take 1 capsule (100 mg) by mouth every 12 hours 60 capsule 11    empagliflozin (JARDIANCE) 10 MG TABS tablet Take 1 tablet (10 mg) by mouth daily +++NEED APPOINTMENT+++ 90 tablet 0    fish oil-omega-3 fatty acids 1000 MG capsule Take 1 capsule (1 g) by mouth daily for 30 days 30 capsule 0    fluPHENAZine (PROLIXIN) 1 MG tablet Take 2 tablets (2 mg) by mouth 2 times daily for 30 days 120 tablet 0    fluPHENAZine (PROLIXIN) 5 MG tablet Take 1 tablet (5 mg) by mouth 2 times daily as needed (agitation, psychosis) 60 tablet 0    fluPHENAZine decanoate (PROLIXIN) 25 MG/ML injection Inject 1 mL (25 mg) into the muscle every 14 days 5 mL 0    hydrOXYzine HCl (ATARAX) 25 MG tablet Take 1 tablet (25 mg) by mouth every 4 hours as needed for anxiety 30 tablet 0    insulin glargine (LANTUS PEN) 100 UNIT/ML pen Inject 25 Units Subcutaneous every morning (before breakfast) 30 mL 1    insulin pen needle (BD SAMANTHA U/F) 32G X 4 MM miscellaneous Use 1 pen needles daily or as directed. 100 each 1    Lidocaine (LIDOCARE) 4 % Patch Place 2 patches onto the skin every 24 hours for 30 days To prevent lidocaine toxicity, patient should be patch  free for 12 hrs daily. 60 patch 0    lithium (ESKALITH CR/LITHOBID) 450 MG CR tablet Take 2 tablets (900 mg) by mouth at bedtime for 30 days 60 tablet 0    magnesium gluconate (MAGONATE) 500 MG tablet Take 1 tablet (500 mg) by mouth daily for 30 days 30 tablet 0    metoprolol succinate ER (TOPROL XL) 25 MG 24 hr tablet Take 1 tablet (25 mg) by mouth daily for 14 days 14 tablet 0    Multiple Vitamins-Minerals (ZINC PO) Take 1 tablet by mouth daily      nicotine (NICODERM CQ) 21 MG/24HR 24 hr patch Place 1 patch onto the skin every 24 hours 30 patch 3    OLANZapine zydis (ZYPREXA) 10 MG ODT Take 1 tablet (10 mg) by mouth 3 times daily as needed 20 tablet 0    ondansetron (ZOFRAN) 4 MG tablet Take 1 tablet (4 mg) by mouth every 6 hours as needed for nausea or vomiting 15 tablet 0    PARoxetine (PAXIL) 10 MG tablet Take 1 tablet (10 mg) by mouth daily for 30 days 30 tablet 0    polyethylene glycol (MIRALAX) 17 GM/Dose powder Take 17 g by mouth daily for 30 days 510 g 0    QUEtiapine (SEROQUEL) 200 MG tablet Take 1 tablet (200 mg) by mouth at bedtime 30 tablet 2    QUEtiapine (SEROQUEL) 25 MG tablet Take 1 tablet (25 mg) by mouth every 6 hours as needed 90 tablet 0    QUEtiapine (SEROQUEL) 50 MG tablet Take 1 tablet (50 mg) by mouth at bedtime 30 tablet 1    rosuvastatin (CRESTOR) 40 MG tablet Take 1 tablet (40 mg) by mouth daily 90 tablet 3    Semaglutide (RYBELSUS) 7 MG tablet Take 1 tablet (7 mg) by mouth daily 90 tablet 1    testosterone (ANDROGEL/TESTIM) 50 MG/5GM (1%) topical gel Place 1 packet (50 mg of testosterone) onto the skin daily 30 packet 0    thin (NO BRAND SPECIFIED) lancets Use with lanceting device to check blood sugars once per day. To accompany: Blood Glucose Monitor Brands: per insurance. 100 each 3    Turmeric 500 MG CAPS Take 1 capsule by mouth daily       No current facility-administered medications for this visit.       ALLERGIES:  Allergies   Allergen Reactions    Haldol [Haloperidol] Other  (See Comments)     Makes patient very angry and anxious    Adhesive Tape Hives    Percocet [Oxycodone-Acetaminophen] Nausea and Vomiting    Prednisone Other (See Comments) and Hives     Suicidal ideation    Risperidone Other (See Comments)    Tramadol Hcl Nausea and Vomiting    Droperidol Anxiety    Seroquel [Quetiapine] Palpitations     Spent 2 weeks in the hospital due to having seroquel, caused palpitations and QT prolongation       SOCIAL HISTORY:  I have reviewed this patient's social history and updated it with pertinent information if needed. Prakash Prasad  reports that he quit smoking about 19 months ago. His smoking use included other and cigarettes. He started smoking about 12 years ago. He has a 5.6 pack-year smoking history. He has never been exposed to tobacco smoke. He quit smokeless tobacco use about 4 years ago.  His smokeless tobacco use included chew. He reports that he does not currently use drugs after having used the following drugs: Marijuana and Opiates. He reports that he does not drink alcohol.    FAMILY HISTORY:  I have reviewed this patient's family history and updated it with pertinent information if needed.   Family History   Problem Relation Age of Onset    Hyperlipidemia Mother     Mental Illness Mother     Anxiety Disorder Mother     Anesthesia Reaction Mother         Vomiting/Nausea    Other Cancer Mother     Skin Cancer Mother     Gastrointestinal Disease Father         Crohn's Disease    Cancer Father         Liver Cancer    Other Cancer Father         Liver    Obesity Father     Skin Cancer Father     No Known Problems Sister     Hypertension Brother     Other Cancer Brother         Esophagecial    Diabetes Brother     Obesity Brother     Hypertension Brother     Other Cancer Brother     Cancer Maternal Grandmother         lympoma    Diabetes Maternal Grandmother     Mental Illness Maternal Grandmother     Other Cancer Maternal Grandmother         Non Hodgkins Lymphoma     "Diabetes Maternal Grandfather     Hypertension Maternal Grandfather     Prostate Cancer Maternal Grandfather     Hyperlipidemia Maternal Grandfather     Heart Disease Paternal Grandfather         heart disease    Other Cancer Paternal Half-Brother        REVIEW OF SYSTEMS:  Constitutional:  No weight loss, fever, chills  HEENT:  Eyes:  No visual loss, blurred vision, double vision or yellow sclerae. No hearing loss, sneezing, congestion, runny nose or sore throat.  Skin:  No rash or itching.  Cardiovascular: per HPI  Respiratory: per HPI  GI:  No anorexia, nausea, vomiting or diarrhea. No abdominal pain or blood.  :  No dysurea, hematuria  Neurologic:  No headache, paralysis, ataxia, numbness or tingling in the extremities. No change in bowel or bladder control.  Musculoskeletal:  No muscle pain  Hematologic:  No bleeding or bruising.  Lymphatics:  No enlarged nodes. No history of splenectomy.  Endocrine:  No reports of sweating, cold or heat intolerance. No polyuria or polydipsia.  Allergies:  No history of asthma, hives, eczema or rhinitis.    PHYSICAL EXAM:  /85   Pulse 102   Ht 1.664 m (5' 5.5\")   Wt 101.9 kg (224 lb 9.6 oz)   BMI 36.81 kg/m    Constitutional: awake, alert, no distress  Eyes: PERRL, sclera nonicteric  ENT: trachea midline  Respiratory: Lungs clear  Cardiovascular: Regular rate and rhythm, no murmurs  GI: nondistended, nontender, bowel sounds present  Lymph/Hematologic: no lymphadenopathy  Skin: dry, no rash  Musculoskeletal: good muscle tone, strength 5/5 in upper and lower extremities  Neurologic: no focal deficits  Neuropsychiatric: appropriate affact    DATA:  Labs:   April 2024: Potassium 3.7, creatinine 0.7  Recent Labs   Lab Test 03/13/24  0944 11/08/23  0746   CHOL 118 113   HDL 39* 39*   LDL 38 41   TRIG 203* 163*     ASSESSMENT:  33-year-old male seen for atrial fibrillation.  His A-fib was likely precipitated by excessive alcohol use.  He does not use regularly.  He appears " to be back in sinus rhythm on exam today.  He has no cardiac symptoms recently otherwise.  Zio monitor will be done to assess rhythm.  Echo will be ordered to look for any underlying structural heart or valve disease.  Assuming these are normal, he could stop Eliquis and metoprolol and about 1 month.    RECOMMENDATIONS:  1.  A-fib, likely secondary to excessive alcohol use  -3-day Zio monitor  -Echo  -Continue metoprolol and Eliquis for now, but okay to stop in about 1 month if testing is normal    Follow-up as needed based on test results.    Espinoza Jimenez MD  Cardiology - [facility]  Pager:  [phone]  Text Page  April 22, 2024

## 2024-04-23 ENCOUNTER — VIRTUAL VISIT (OUTPATIENT)
Dept: PSYCHIATRY | Facility: CLINIC | Age: 34
End: 2024-04-23
Attending: NURSE PRACTITIONER
Payer: MEDICARE

## 2024-04-23 ENCOUNTER — ORDERS ONLY (AUTO-RELEASED) (OUTPATIENT)
Dept: CARDIOLOGY | Facility: CLINIC | Age: 34
End: 2024-04-23
Payer: MEDICARE

## 2024-04-23 VITALS — WEIGHT: 220 LBS | BODY MASS INDEX: 35.36 KG/M2 | HEIGHT: 66 IN

## 2024-04-23 DIAGNOSIS — F39 MOOD DISORDER (H): Primary | ICD-10-CM

## 2024-04-23 DIAGNOSIS — F41.1 GAD (GENERALIZED ANXIETY DISORDER): ICD-10-CM

## 2024-04-23 DIAGNOSIS — F25.9 SCHIZOAFFECTIVE DISORDER, CHRONIC CONDITION WITH ACUTE EXACERBATION (H): ICD-10-CM

## 2024-04-23 DIAGNOSIS — I48.91 ATRIAL FIBRILLATION WITH RVR (H): ICD-10-CM

## 2024-04-23 PROCEDURE — 99443 PR PHYSICIAN TELEPHONE EVALUATION 21-30 MIN: CPT | Mod: FQ | Performed by: NURSE PRACTITIONER

## 2024-04-23 RX ORDER — FLUPHENAZINE DECANOATE 25 MG/ML
25 INJECTION, SOLUTION INTRAMUSCULAR; SUBCUTANEOUS
Qty: 5 ML | Refills: 0 | Status: SHIPPED | OUTPATIENT
Start: 2024-04-23 | End: 2024-09-13

## 2024-04-23 RX ORDER — OLANZAPINE 10 MG/1
10 TABLET, ORALLY DISINTEGRATING ORAL 3 TIMES DAILY PRN
Qty: 20 TABLET | Refills: 0 | Status: SHIPPED | OUTPATIENT
Start: 2024-04-23 | End: 2024-08-07

## 2024-04-23 RX ORDER — HYDROXYZINE HYDROCHLORIDE 25 MG/1
25 TABLET, FILM COATED ORAL EVERY 4 HOURS PRN
Qty: 30 TABLET | Refills: 0 | Status: SHIPPED | OUTPATIENT
Start: 2024-04-23 | End: 2024-06-05

## 2024-04-23 RX ORDER — CLONAZEPAM 0.5 MG/1
0.25 TABLET ORAL 2 TIMES DAILY
Qty: 30 TABLET | Refills: 0 | Status: ON HOLD | OUTPATIENT
Start: 2024-04-23 | End: 2024-09-27

## 2024-04-23 RX ORDER — FLUPHENAZINE HYDROCHLORIDE 1 MG/1
2 TABLET ORAL 2 TIMES DAILY
Qty: 120 TABLET | Refills: 0 | Status: SHIPPED | OUTPATIENT
Start: 2024-04-23 | End: 2024-09-13

## 2024-04-23 RX ORDER — PAROXETINE 10 MG/1
10 TABLET, FILM COATED ORAL DAILY
Qty: 30 TABLET | Refills: 0 | Status: ON HOLD | OUTPATIENT
Start: 2024-04-23 | End: 2024-09-27

## 2024-04-23 RX ORDER — FLUPHENAZINE HYDROCHLORIDE 5 MG/1
5 TABLET ORAL 2 TIMES DAILY PRN
Qty: 60 TABLET | Refills: 0 | Status: SHIPPED | OUTPATIENT
Start: 2024-04-23 | End: 2024-09-13

## 2024-04-23 RX ORDER — ARIPIPRAZOLE 5 MG/1
5 TABLET ORAL EVERY MORNING
Qty: 30 TABLET | Refills: 0 | Status: ON HOLD | OUTPATIENT
Start: 2024-04-23 | End: 2024-09-27

## 2024-04-23 RX ORDER — LITHIUM CARBONATE 450 MG
900 TABLET, EXTENDED RELEASE ORAL AT BEDTIME
Qty: 60 TABLET | Refills: 0 | Status: SHIPPED | OUTPATIENT
Start: 2024-04-23

## 2024-04-23 ASSESSMENT — PAIN SCALES - GENERAL: PAINLEVEL: NO PAIN (0)

## 2024-04-23 NOTE — PROGRESS NOTES
"Virtual Visit Details    Type of service:  Video Visit   Video Start Time: {video visit start/end time for provider to select:476787}  Video End Time:{video visit start/end time for provider to select:314997}    Originating Location (pt. Location): {video visit patient location:896970::\"Home\"}  {PROVIDER LOCATION On-site should be selected for visits conducted from your clinic location or adjoining [facility] hospital, academic office, or other nearby [facility] building. Off-site should be selected for all other provider locations, including home:[id number]}  Distant Location (provider location):  {virtual location provider:896446}  Platform used for Video Visit: {Virtual Visit Platforms:233049::\"Cannonball\"}  "
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
## 2024-04-23 NOTE — CONFIDENTIAL NOTE
NEUROSURGERY- NEW PREVISIT PLANNING       Record Status/Location     Referring Provider Referral   Tigist Hull, [place] CNP      Diagnosis Referral M54.17 (ICD-10-CM) - Lumbosacral radiculopathy at L5    MRI (HEAD, NECK, SPINE) Pacs Lumbar 1/29/21 [facility]    CT Pacs Pelvis 1/10/21 [facility]    X-ray Scheduled Lumbar 4/26/24 [facility]   INJECTION Procedures  3/18/21- Inject Epidural Transforaminal Lumbar / Sacral Ea    PHYSICAL THERAPY [facility]    SURGERY CE 12/24/16- L5 S1 decompression discectomy      Additional Information:  chronic pain and radiculopathy following lumbar fusion, surgeon retired

## 2024-04-23 NOTE — NURSING NOTE
Is the patient currently in the state of [place]? YES    Visit mode:VIDEO    If the visit is dropped, the patient can be reconnected by: VIDEO VISIT: Text to cell phone:   Telephone Information:   Mobile [phone]       Will anyone else be joining the visit? NO  (If patient encounters technical issues they should call [phone]    How would you like to obtain your AVS? MyChart    Are changes needed to the allergy or medication list? No    Are refills needed on medications prescribed by this physician? NO    Reason for visit: RECHECK    Kezia VANN

## 2024-04-23 NOTE — PATIENT INSTRUCTIONS
-Discontinue Adderall.  -Continue all other medication regimen for now.    -Please fax medication administration records of last 1 month to the clinic ([phone])    -[place] or mattie will contact for follow up appointment.    **For crisis resources, please see the information at the end of this document**   Patient Education    Thank you for coming to the [facility].     Lab Testing:  If you had lab testing today and your results are reassuring or normal they will be mailed to you or sent through [employer] within 7 days. If the lab tests need quick action we will call you with the results. The phone number we will call with results is # [phone]. If this is not the best number please call our clinic and change the number.     Medication Refills:  If you need any refills please call your pharmacy and they will contact us. Our fax number for refills is [phone].   Three business days of notice are needed for general medication refill requests.   Five business days of notice are needed for controlled substance refill requests.   If you need to change to a different pharmacy, please contact the new pharmacy directly. The new pharmacy will help you get your medications transferred.     Contact Us:  Please call [phone] during business hours (8-5:00 M-F).   If you have medication related questions after clinic hours, or on the weekend, please call [phone].     Financial Assistance [phone]   Medical Records [phone]       MENTAL HEALTH CRISIS RESOURCES:  For a emergency help, please call 911 or go to the nearest Emergency Department.     Emergency Walk-In Options:   EmPATH Unit @ [facility] ([place]): [phone] - Specialized mental health emergency area designed to be calming  [facility] ([place]): [phone]  [facility] ([place]): [phone]  [facility]):  [phone]    [facility] Crisis Information:   [place]: [phone]  [place]: [phone]  [place] ([place]) - Adult: [phone]     Child: [phone]  [place] - Adult: [phone]     Child: [phone]  [place]: [phone]  List of all [facility] resources:   https://[place].gov/dhs/people-we-serve/adults/health-care/mental-health/resources/crisis-contacts.jsp    National Crisis Information:   Crisis Text Line: Text  [place]  to [address]1935  Suicide & Crisis Lifeline: 988  National Suicide Prevention Lifeline: 3-426-308-TALK (1-951.847.3339)       For online chat options, visit https://suicidepreventionlifeline.org/chat/  Poison Control Center: 1-732.201.2325  Trans Lifeline: 1-277.814.8323 - Hotline for transgender people of all ages  The [employer]: 6-153-147-6157 - Hotline for LGBT youth     For Non-Emergency Support:   Fast Tracker: Mental Health & Substance Use Disorder Resources -   https://www.[employer].org/

## 2024-04-25 ASSESSMENT — ACTIVITIES OF DAILY LIVING (ADL): DEPENDENT_IADLS:: INDEPENDENT

## 2024-04-25 NOTE — PROGRESS NOTES
Clinic Care Coordination Contact    SW has been corresponding with psychiatry via [employer], psychiatry is questioning if patient is still on a restricted program.  SW attempted to call patient to discuss today, the  is not accepting messages     SW will attempt again tomorrow     Sydni Hawkins, NICOLW, MSW   [facility]  Care Coordination  [facility]  [phone]  4/25/2024 2:36 PM

## 2024-04-26 ENCOUNTER — PRE VISIT (OUTPATIENT)
Dept: NEUROSURGERY | Facility: CLINIC | Age: 34
End: 2024-04-26

## 2024-04-26 RX ORDER — ATOMOXETINE 40 MG/1
CAPSULE ORAL
Qty: 60 CAPSULE | Refills: 10 | OUTPATIENT
Start: 2024-04-26

## 2024-04-26 RX ORDER — PROPRANOLOL HYDROCHLORIDE 10 MG/1
TABLET ORAL
Qty: 90 TABLET | Refills: 10 | OUTPATIENT
Start: 2024-04-26

## 2024-04-26 NOTE — TELEPHONE ENCOUNTER
"Date of Last Office Visit: 4/23/2024  [facility]     Regine Last APRN CNP     RTC: after consultation with CM and [facility]   No shows: 0  Cancellations: x1  4/25  Date of Next Office Visit: 0  ------------------------------    Incoming refill from Pharmacy via interface  Medication requested:    Strattera 40mg  Propranolol 10mg  ------------------------------  From last visit note:   \"4/16/24:  -Discontinue Strattera.  -Start Adderall XR 15 mg daily for ADHD. Monitor psychosis, anxiety, sleep difficulties.  -Continue all other medication regimen\"     \"Discontinued 4/12/2024  Discontinued - propranolol (INDERAL) 10 MG tablet  - Propranolol 10-20mg (effective, poorly tolerated- may have dropped BP, retrial note not effective) \"      Medication unable to be refilled by RN due to criteria not met as indicated.                 []Eligibility - not seen in the last year              []Supervision - no future appointment              []Compliance - no shows, cancellations or lapse in therapy              []Verification - order discrepancy              []Controlled medication              []Medication not included in policy              []90-day supply request              [x]Other: meds discontinued                       "

## 2024-04-29 ENCOUNTER — TELEPHONE (OUTPATIENT)
Dept: FAMILY MEDICINE | Facility: CLINIC | Age: 34
End: 2024-04-29
Payer: MEDICARE

## 2024-04-29 ASSESSMENT — ACTIVITIES OF DAILY LIVING (ADL): DEPENDENT_IADLS:: INDEPENDENT

## 2024-04-29 NOTE — TELEPHONE ENCOUNTER
"No pharmacy selected, will need to clarify that as well.    I see gabapentin 600 mg on past med list, 2 tabs 3 times daily, removed from list on 3/8/24 \"patient reported not taking\".  Was last prescribed by BROOKLYN Bustos CNP on 1/25/24.  Appears this provider is with psychiatry.    I see per 4/18/24 virtual visit with PCP she was referred/advised to find a long-term psychiatrist due to complex mental health issues and medications.      I also see the following note regarding back pain and numbness in the 4/18/24 virtual visit:    Lower back pain both sides of lower back. Pain going down left leg. Does have numbness and tingling since previous back surgery but is not increased from baseline. Is scheduled to surgery. No new injury.       Attempted to call patient at home/mobile number, no answer, left message on voicemail; patient was instructed to return call to [facility] at [phone].    Edith HERNANDEZ RN  [facility] Triage        "

## 2024-04-29 NOTE — TELEPHONE ENCOUNTER
Gabapentin      Last Written Prescription Date:    Last Fill Quantity: ,   # refills:   Last Office Visit:   Future Office visit:    Next 5 appointments (look out 90 days)      May 06, 2024 11:00 AM  FLORA Juarez with Alice Tubbs RPH  [facility] ([facility] ) [address] [place] [id number] [phone]             Routing refill request to provider for review/approval because:  Drug not active on patient's medication list

## 2024-04-29 NOTE — PROGRESS NOTES
Clinic Care Coordination Contact  [facility]/Voicemail    Clinical Data: Care Coordinator Outreach    Outreach Documentation Number of Outreach Attempt   4/29/2024   1:07 PM 2       Left message on patient's voicemail with call back information and requested return call.    Plan: Care Coordinator sent care coordination introduction letter via [employer]. Care Coordinator will try to reach patient again in 1-2 business days.    RAYMUNDO Augustin, MSW   [facility]  Care Coordination  [facility]  [phone]  4/29/2024 1:08 PM

## 2024-04-30 ENCOUNTER — TELEPHONE (OUTPATIENT)
Dept: PSYCHIATRY | Facility: CLINIC | Age: 34
End: 2024-04-30
Payer: MEDICARE

## 2024-04-30 NOTE — TELEPHONE ENCOUNTER
Writer called patient's  (Cristy) to confirm care conference date and time (Thursday, 5/9 at 10AM for 30 min) with provider,  and . Cristy stated she was going to call  to confirm this today and will call writer back as soon as possible.

## 2024-05-01 ENCOUNTER — TELEPHONE (OUTPATIENT)
Dept: PSYCHIATRY | Facility: CLINIC | Age: 34
End: 2024-05-01
Payer: MEDICARE

## 2024-05-01 ENCOUNTER — VIRTUAL VISIT (OUTPATIENT)
Dept: PSYCHIATRY | Facility: CLINIC | Age: 34
End: 2024-05-01
Attending: PSYCHOLOGIST
Payer: MEDICARE

## 2024-05-01 DIAGNOSIS — F48.9 MENTAL HEALTH PROBLEM: Primary | ICD-10-CM

## 2024-05-01 PROCEDURE — 99207 PR INCOMPL DIAG INTERV-PSYCH TEST: CPT | Mod: 95 | Performed by: PSYCHOLOGIST

## 2024-05-01 ASSESSMENT — ANXIETY QUESTIONNAIRES
GAD7 TOTAL SCORE: 18
2. NOT BEING ABLE TO STOP OR CONTROL WORRYING: NEARLY EVERY DAY
5. BEING SO RESTLESS THAT IT IS HARD TO SIT STILL: NEARLY EVERY DAY
7. FEELING AFRAID AS IF SOMETHING AWFUL MIGHT HAPPEN: NEARLY EVERY DAY
GAD7 TOTAL SCORE: 18
3. WORRYING TOO MUCH ABOUT DIFFERENT THINGS: NEARLY EVERY DAY
1. FEELING NERVOUS, ANXIOUS, OR ON EDGE: NEARLY EVERY DAY
IF YOU CHECKED OFF ANY PROBLEMS ON THIS QUESTIONNAIRE, HOW DIFFICULT HAVE THESE PROBLEMS MADE IT FOR YOU TO DO YOUR WORK, TAKE CARE OF THINGS AT HOME, OR GET ALONG WITH OTHER PEOPLE: EXTREMELY DIFFICULT
8. IF YOU CHECKED OFF ANY PROBLEMS, HOW DIFFICULT HAVE THESE MADE IT FOR YOU TO DO YOUR WORK, TAKE CARE OF THINGS AT HOME, OR GET ALONG WITH OTHER PEOPLE?: EXTREMELY DIFFICULT
4. TROUBLE RELAXING: NEARLY EVERY DAY
6. BECOMING EASILY ANNOYED OR IRRITABLE: NOT AT ALL
7. FEELING AFRAID AS IF SOMETHING AWFUL MIGHT HAPPEN: NEARLY EVERY DAY
GAD7 TOTAL SCORE: 18

## 2024-05-01 ASSESSMENT — PATIENT HEALTH QUESTIONNAIRE - PHQ9
10. IF YOU CHECKED OFF ANY PROBLEMS, HOW DIFFICULT HAVE THESE PROBLEMS MADE IT FOR YOU TO DO YOUR WORK, TAKE CARE OF THINGS AT HOME, OR GET ALONG WITH OTHER PEOPLE: VERY DIFFICULT
SUM OF ALL RESPONSES TO PHQ QUESTIONS 1-9: 10
SUM OF ALL RESPONSES TO PHQ QUESTIONS 1-9: 10

## 2024-05-01 ASSESSMENT — ACTIVITIES OF DAILY LIVING (ADL): DEPENDENT_IADLS:: INDEPENDENT

## 2024-05-01 NOTE — PROGRESS NOTES
Clinic Care Coordination Contact  [facility]    Clinical Data: Care Coordinator Outreach    Outreach Documentation Number of Outreach Attempt   4/29/2024   1:07 PM 2   5/1/2024   4:51 PM 2       VM is full     Plan: Care Coordinator will do no further outreaches at this time.    RAYMUNDO Augustin, MSW   [facility]  Care Coordination  [facility]  [phone]  5/1/2024 4:52 PM

## 2024-05-01 NOTE — PROGRESS NOTES
Visit 1 of 2    Client: Prakash Prasad  : 1990  MRN: [id number]  Date of Service: 2024    The patient was seen by outpatient psychologist, Concha Viramontes Psy.D., L.P. The limits of confidentiality were reviewed at the onset of the session. An diagnostic interview was conducted. The patient completed the BDI, a BPD screener, and MMPI during this visit. The complete report will be submitted on the feedback visit on 24.    Answers submitted by the patient for this visit:  Patient Health Questionnaire (Submitted on 2024)  If you checked off any problems, how difficult have these problems made it for you to do your work, take care of things at home, or get along with other people?: Very difficult  PHQ9 TOTAL SCORE: 10    AVA-7 (Submitted on 2024)  AVA 7 TOTAL SCORE: 18

## 2024-05-01 NOTE — TELEPHONE ENCOUNTER
[facility] referral from: [facility] visit (referral by provider)    MT referral outreach attempt #1 on May 1, 2024 at 2:31 PM      Outcome: Patient is not interested at this time because his condition is improving per his mychart cancellation.     Use vbc for the carrier/Plan on the flowsheet          See [place] [facility]   [phone]

## 2024-05-03 ENCOUNTER — TELEPHONE (OUTPATIENT)
Dept: PSYCHIATRY | Facility: CLINIC | Age: 34
End: 2024-05-03
Payer: MEDICARE

## 2024-05-03 ENCOUNTER — MYC MEDICAL ADVICE (OUTPATIENT)
Dept: PSYCHIATRY | Facility: CLINIC | Age: 34
End: 2024-05-03
Payer: MEDICARE

## 2024-05-03 NOTE — TELEPHONE ENCOUNTER
Telephone call to Nadine Christensen, supervisor for CM Cristy Mcgee. Shared concern about 1) CM not responding to our multiple request to follow up on care conference with CM, GH manager and this provider and 2) not sharing substance use information due to medication safety. Supervisor or CM will get back to confirm or rearrange care conference today. Regine Last, NELLY, 5/3/2024

## 2024-05-03 NOTE — TELEPHONE ENCOUNTER
[facility]    Phone Message    May a detailed message be left on voicemail: yes     Reason for Call: Other: Pt's [employer] , Cristy Ji at [facility], called to leave a message for [place]: Domenica would not return her calls, but returned a call to Prakash, while they were meeting. They would all like to meet together virtually at the next appt scheduled with Regine (they would need sperate links sent). If Regine wants an appt without Prakash present, they would have to coordinate that. She also stated that she will return her phone calls as soon as possible, but calling multiple times won't increase her chances of calling back sooner.       Action Taken: Other: [facility]    Travel Screening: Not Applicable

## 2024-05-03 NOTE — TELEPHONE ENCOUNTER
"Writer placed a second call to Domenica. Domenica states that patient has been doing pretty well and has not had any behavioral concerns. She states there was one day he was just extremely happy and Domenica states that typically means that he has used marijuana.     Domenica does not know why patient states he has not been eating or sleeping, stating that he has been sleeping and states she was just at the home with them and they had ordered lunch and everyone was eating.     Domenica also states that patient has been taking his medications \"as far as I know.\"     Domenica also confirmed that she had spoken with Cristy and can attend the care conference on 5/9 at 10:00.  "

## 2024-05-03 NOTE — TELEPHONE ENCOUNTER
[facility] Call Center    Phone Message    May a detailed message be left on voicemail: yes     Reason for Call: Symptoms or Concerns     Current symptom or concern: PT stated he has not slept since Monday and has stopped eating as well. PT stated his anxiety is extremely high. Writer offered pt appt with provider on 5/22/24; pt is requesting sooner appt than is available. PT states they are experiencing audio and visual hallucinations.     Symptoms have been present for:  1 week(s)    By: BROOKLYN Calvin     Date: last seen 4/24/24    Are there any new or worsening symptoms? Yes    Action Taken: Other: [facility]    Travel Screening: Not Applicable

## 2024-05-03 NOTE — TELEPHONE ENCOUNTER
Writer attempted to call patient's  again to check back in on care conference. LVM requesting a call back at the main clinic number.

## 2024-05-03 NOTE — TELEPHONE ENCOUNTER
Patient calling back    He did not request this med, it must be automated.    Closing encounter    Vangie Juarez RN

## 2024-05-03 NOTE — TELEPHONE ENCOUNTER
"Writer attempted to call Domenica patient's GH manager to follow up on patient's behavior changes. LVM requesting a call back at the main clinic number.     Writer called patient who states he hasn't slept since Monday and hasn't eaten in a few days. He states that he has been drinking water, but has not had an appetite and feels like he's going to vomit. Patient reports AH/VH and not feeling very good. He reports high anxiety.     Patient states this is likely due to using Ecstasy this week. He states he last used it yesterday and he is now out. Patient states that roommate leaving for [place] was difficult and he feels this may have contributed to his drug use. Patient also reports anxiety around starting his substance use program. He states it is a harm reduction program, so he states he does not have to be abstinent, but it is still making him nervous that he has to be sober for it. He states he told his therapist that he has an overwhelming feeling to get \"as fucked up as I can.\" Patient states he was supposed to start this program this upcoming Monday, but states he did not do the intake today, so it is going to get pushed back. He states he hasn't called to reschedule the intake and writer encouraged him to do this.     Patient states he hasn't taken any of his medications in 2 weeks except for his fluphenazine injection, which he believes he received 2-3 days ago. Patient denies any safety concerns at this time. Patient's speech was clear and coherent with regular rate and rhythm.     Patient states he hasn't utilized PRN olanzapine for his AH/VH stating that he is afraid of taking medication right now.     Patient states that he has been trying to manage his anxiety by utilizing coping skills of journaling and reports he does  a lot of writing. He also spends time with his dog, Dnog.     Writer encouraged patient to present to the ER if there are safety concerns and if symptoms continue to worsen. Routed to " provider for additional follow up.

## 2024-05-03 NOTE — TELEPHONE ENCOUNTER
Writer returned call to Cristy. Cristy states she misunderstood the care conference and that Prakash had wanted to be a part of it. Cristy states that she could not get ahold of Domenica until she was returning a call to Prakash, so it was discussed during that visit. Cristy does feel that it would be beneficial for patient to be a part of this care conference so that everyone is on the same page.     Cristy apologized for delay in returning call. She states that due to being a targeted , she is not able to respond immediately and that urgent calls should be directed to patient's , Domenica. She states that she is often in the community with clients all day.     Cristy would like to touch base with provider ahead of 5/9 if possible, though understands this may not be due to limited availability of both her and provider's schedule.     Cristy states she is available on Monday between 2:00 and 3:00, as well as after 4:00. On Tuesday, Cristy states she has very limited availability at 11:00AM, but if provider is not available on Monday, she would really appreciate a call from provider on Tuesday as she would like to clear the air with provider regarding concerns about responsiveness and ensure they are able to have a care conference and be on the same page.     Cristy states that during her appointment with Prakash today, they talked a lot about why staff supervision is important and she states he seemed to understand it was for his benefit and future independence. 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 She also notes that patient was very happy today and appeared to be under the influence of a substance. She states patient also reported to her that he had not been eating or sleeping.

## 2024-05-05 PROCEDURE — 93244 EXT ECG>48HR<7D REV&INTERPJ: CPT | Performed by: INTERNAL MEDICINE

## 2024-05-09 ENCOUNTER — VIRTUAL VISIT (OUTPATIENT)
Dept: PSYCHIATRY | Facility: CLINIC | Age: 34
End: 2024-05-09
Attending: NURSE PRACTITIONER
Payer: MEDICARE

## 2024-05-09 ENCOUNTER — TELEPHONE (OUTPATIENT)
Dept: PSYCHIATRY | Facility: CLINIC | Age: 34
End: 2024-05-09
Payer: MEDICARE

## 2024-05-09 DIAGNOSIS — F60.3 BORDERLINE PERSONALITY DISORDER (H): Primary | ICD-10-CM

## 2024-05-09 PROCEDURE — 99207 PR NO BILLABLE SERVICE THIS VISIT: CPT | Mod: 95 | Performed by: NURSE PRACTITIONER

## 2024-05-09 NOTE — CONFIDENTIAL NOTE
"Care conference with pt, CM and GH manager did not occur as GH manager was no show at 1015. Pt left the appt noting \"I can't do this anymore.\" Instructed CM to discuss with pt that if he wants to continue care with this provider. For now, another care conference is scheduled on 5/28 Tue at 1130, but if pt decides not to continue care with this provider to inform appt for care conference to cancel.  No charges today. CM noted GM manager do not return her call. Encouraged CM supervisor to contact GM manager. Also delegated nursing staff to call GH manager to monitor medication carefully as pt was upset that care conference could not occur today. Also to inform us if pt has been taking Klonopin. If pt has not been taking Klonopin, will discontinue Klonopin. Regine Last, CNP, 5/9/2024      "

## 2024-05-09 NOTE — NURSING NOTE
Is the patient currently in the state of [place]? YES    Visit mode:VIDEO    If the visit is dropped, the patient can be reconnected by: VIDEO VISIT: Text to cell phone:   Telephone Information:   Mobile [phone]       Will anyone else be joining the visit? NO  (If patient encounters technical issues they should call [phone])    How would you like to obtain your AVS? MyChart    Are changes needed to the allergy or medication list? Pt stated no changes to allergies and Pt stated no med changes    Are refills needed on medications prescribed by this physician? NO    Reason for visit: LULU CIDF

## 2024-05-09 NOTE — PROGRESS NOTES
"Virtual Visit Details    Type of service:  Video Visit   Video Start Time: {video visit start/end time for provider to select:537558}  Video End Time:{video visit start/end time for provider to select:546575}    Originating Location (pt. Location): {video visit patient location:726320::\"Home\"}  {PROVIDER LOCATION On-site should be selected for visits conducted from your clinic location or adjoining [facility] hospital, academic office, or other nearby [facility] building. Off-site should be selected for all other provider locations, including home:832763}  Distant Location (provider location):  {virtual location provider:491845}  Platform used for Video Visit: {Virtual Visit Platforms:007432::\"[employer]\"}    "

## 2024-05-09 NOTE — TELEPHONE ENCOUNTER
Spoke with Cristy -  for patient. She thinks that patient may try to find a different provider. He would like to continue with mattie but does not feel like he should have to have a care conference with the other members of his team. He is upset that his  manager did not show up. He would like to have medication changes made.   Cristy is hoping that mattie is able to get the medication administration records from the  directly vs having care conference. Explained to Cristy that the issue is the  is having issues monitoring patient medication administration due to reports of aggressive behavior and on going medication safety issues with recent overdose attempts. Cristy says patient is reporting that he has been taking medications in front of staff and there are no issues except that staff is not giving correct medication.  Mattie would like to speak with team to make sure that everyone has the correct information and plan going forward for patient safety.   Will follow up after team meeting tomorrow.

## 2024-05-09 NOTE — TELEPHONE ENCOUNTER
Left voicemail for GH manager to let them know that patient left appointment upset and to keep good watch over medications. Also need to know if patient is still taking Klonopin and when the last time was. New meeting set up for 5/28 at 11:30.

## 2024-05-10 ENCOUNTER — DOCUMENTATION ONLY (OUTPATIENT)
Dept: PSYCHIATRY | Facility: CLINIC | Age: 34
End: 2024-05-10
Payer: MEDICARE

## 2024-05-10 NOTE — CONFIDENTIAL NOTE
Consideration for the future;    1) vulnerable adult report of GH for not being able to accurately administer/record medication administration record due to pt's aggressive behavior.  2) CM supervisor to also be present in care conference.  3) lab work/UTOX to monitor medication adherence and substance use. Need to check with lab to see if there's any lab that can monitor pt's adherence of medication.  4) 60 days med refill if pt decides to transfer care to find next provider.    Will continue on current medication regimen until care conference to guarantee safe medication administration.  Will only continue on current medication regimen based on GH being responsible for medication administration. Recent reports of GH noting not able to do witnessed medication, pt report on GH giving Adderall in the evening when it's not prescribed is concerning. But there's a suspicion that pt may be providing different narratives to different providers (eg, pt reporting not taking meds for 2 weeks, not eating and sleeping to this writer and CM while GH noted pt has been taking medications eating and sleeping).  Therefore, care conference is indispensable to create safe medication administration and monitoring of symptoms. Will create behavioral contract and if pt disagree, pt will be discharged with 60 days medication refill. Regine Last, CNP, 5/10/2024

## 2024-05-12 ENCOUNTER — MYC MEDICAL ADVICE (OUTPATIENT)
Dept: PSYCHIATRY | Facility: CLINIC | Age: 34
End: 2024-05-12

## 2024-05-14 ENCOUNTER — TELEPHONE (OUTPATIENT)
Dept: FAMILY MEDICINE | Facility: CLINIC | Age: 34
End: 2024-05-14
Payer: MEDICARE

## 2024-05-14 DIAGNOSIS — E11.65 TYPE 2 DIABETES MELLITUS WITH HYPERGLYCEMIA, WITHOUT LONG-TERM CURRENT USE OF INSULIN (H): Primary | ICD-10-CM

## 2024-05-14 NOTE — TELEPHONE ENCOUNTER
"Patient calling, says he's been not taking his meds except for the insulin and injectable psych med for about 3 weeks.   I inquired why, he says it was \"just a mental health issue\" and he will be following up with a new psych provider on Thursday and has video visit with Becki tomorrow.    Wonders if he needs to get back on \"physical health\" meds slowly or just resume?      I advised the BP and cholesterol med can be resumed, does not need to taper up.    Patient also says the current brand of pen needles always \"poke through the side of the cap\" when recapping for disposal after administration.   He is switching to [facility] for the pen needles so wants a generic Rx sent to them to see if they carry a brand that does not do this.    Says he tries recapping going straight onto the needle but it still happens.    Rx for generic pen needles naima'd, routed to PCP to send new Rx.    Edith HERNANDEZ RN  [facility] Triage    "

## 2024-05-15 ENCOUNTER — VIRTUAL VISIT (OUTPATIENT)
Dept: FAMILY MEDICINE | Facility: CLINIC | Age: 34
End: 2024-05-15
Payer: MEDICARE

## 2024-05-15 ENCOUNTER — TELEPHONE (OUTPATIENT)
Dept: PSYCHIATRY | Facility: CLINIC | Age: 34
End: 2024-05-15

## 2024-05-15 DIAGNOSIS — Z79.4 TYPE 2 DIABETES MELLITUS WITH HYPERGLYCEMIA, WITH LONG-TERM CURRENT USE OF INSULIN (H): ICD-10-CM

## 2024-05-15 DIAGNOSIS — Z11.3 SCREENING EXAMINATION FOR STI: ICD-10-CM

## 2024-05-15 DIAGNOSIS — E11.65 TYPE 2 DIABETES MELLITUS WITH HYPERGLYCEMIA, WITH LONG-TERM CURRENT USE OF INSULIN (H): ICD-10-CM

## 2024-05-15 DIAGNOSIS — B00.1 HERPES LABIALIS: Primary | ICD-10-CM

## 2024-05-15 DIAGNOSIS — Z72.89 OTHER PROBLEMS RELATED TO LIFESTYLE: ICD-10-CM

## 2024-05-15 DIAGNOSIS — Z11.59 NEED FOR HEPATITIS C SCREENING TEST: ICD-10-CM

## 2024-05-15 PROBLEM — E11.9 DIABETES MELLITUS, TYPE 2 (H): Status: RESOLVED | Noted: 2023-01-19 | Resolved: 2024-05-15

## 2024-05-15 PROCEDURE — 99214 OFFICE O/P EST MOD 30 MIN: CPT | Mod: 95 | Performed by: NURSE PRACTITIONER

## 2024-05-15 RX ORDER — VALACYCLOVIR HYDROCHLORIDE 1 G/1
2000 TABLET, FILM COATED ORAL 2 TIMES DAILY
Qty: 8 TABLET | Refills: 2 | Status: SHIPPED | OUTPATIENT
Start: 2024-05-15 | End: 2024-08-12

## 2024-05-15 NOTE — TELEPHONE ENCOUNTER
Writer attempted to call patient's Cristy BARTHOLOMEW to inquire about date of patient's appointment with new provider. LVM requesting a call back at the main clinic number.

## 2024-05-15 NOTE — PROGRESS NOTES
Prakash is a 33 year old who is being evaluated via a billable video visit.          Assessment & Plan     Herpes labialis  Education given  - Herpes Simplex Virus 1 and 2 IgG; Future  - valACYclovir (VALTREX) 1000 mg tablet; Take 2 tablets (2,000 mg) by mouth 2 times daily    Screening examination for STI  Encouraged to schedule lab only appointment  - Chlamydia trachomatis PCR  - Neisseria gonorrhoeae PCR  - Herpes Simplex Virus 1 and 2 IgG; Future  - Treponema Abs w Reflex to RPR and Titer; Future    Need for hepatitis C screening test  Encouraged to schedule lab only appointment    Other problems related to lifestyle  Encouraged to schedule lab only appointment  - Hepatitis C Screen Reflex to HCV RNA Quant and Genotype; Future    Type 2 diabetes mellitus with hyperglycemia, with long-term current use of insulin (H)  Discussed GLP1 agonist class medications with patient   -Serious reactions including pancreatitis, anaphylaxis, papillary thyroid cancer, thyroid cell and medullary thyroid carcinoma risk discussed. Patient does not have any personal or family history of thyroid malignancies.    Discontinue Rybelsus.  Start on Ozempic.  Educated on use and possible side effects.  Most recent A1c reviewed.  - Adult Eye  Referral; Future  - semaglutide (OZEMPIC) 2 MG/3ML pen; Inject 0.25 mg Subcutaneous every 7 days for 30 days, THEN 0.5 mg every 7 days for 60 days.    The longitudinal plan of care for the diagnosis(es)/condition(s) as documented were addressed during this visit. Due to the added complexity in care, I will continue to support Prakash in the subsequent management and with ongoing continuity of care.     Subjective   Prakash is a 33 year old, presenting for the following health issues:  cold sore (Has a cold sore in his nose and face.)      5/15/2024    11:09 AM   Additional Questions   Roomed by cam   Accompanied by none         5/15/2024    11:09 AM   Patient Reported Additional Medications    Patient reports taking the following new medications none     Video Start Time: 11:19 AM    History of Present Illness       Reason for visit:  Cold sores  Symptom onset:  1-3 days ago  Symptoms include:  Sores in his nose and face  Symptom intensity:  Moderate  Symptom progression:  Worsening  Had these symptoms before:  Yes  Has tried/received treatment for these symptoms:  No  What makes it worse:  No  What makes it better:  Alcohol swab hepled    He eats 2-3 servings of fruits and vegetables daily.He consumes 0 sweetened beverage(s) daily.He exercises with enough effort to increase his heart rate 20 to 29 minutes per day.  He exercises with enough effort to increase his heart rate 3 or less days per week.   He is taking medications regularly.         Video visit completed.  For the last 3-4 months will have sores in mouth and nose.  Has been putting alcohol on areas on lips, this does seem to help.  Has been putting it in nose but it is very painful.  Does not have any vaginal lesions.  No vaginal itching, discharge.  No pain with urination.  Will feel burning and itching to lip before sores come to surface.  Lips get dry, then cracked in the middle of the night and thinks that is what brought on current cold sore.    Did schedule appointment with new psychiatrist.  Is actually going back to previous psychiatrist at [facility].  -has appointment scheduled tomorrow.  Has only been taking insulin and Prolia exam.  Is not taking any oral medications.  Not taking them due to poor mental health.  Plans to restart them today.    Reports improved blood sugars when was taking Rybelsus.  Does not like having to take so many medications.  Is agreeable to changing to injectable form.  No family history of thyroid cancer.    Would like to be checked for sexually transmitted infections.  Is not currently having any symptoms.        Objective           Vitals:  No vitals were obtained today due  to virtual visit.    Physical Exam  Constitutional:       Appearance: Normal appearance.   HENT:      Head:        Nose: No congestion.   Eyes:      General: Lids are normal.   Pulmonary:      Effort: No tachypnea, bradypnea or respiratory distress.   Skin:     Coloration: Skin is not ashen, cyanotic, jaundiced or pale.   Neurological:      Mental Status: He is alert.   Psychiatric:         Mood and Affect: Mood normal.         Speech: Speech normal.         Behavior: Behavior normal.            Video-Visit Details    Type of service:  Video Visit   Video End Time:11:41 AM  Originating Location (pt. Location): Home    Distant Location (provider location):  On-site  Platform used for Video Visit: Evens  Signed Electronically by: BROOKLYN Cruz CNP

## 2024-05-17 NOTE — PLAN OF CARE
"Pt continues to endorse SI, stating that he has command hallucination to hurt self. Repeatedly voiced SI to writer. Denies having any plan. No noted instances of SIB observed or reported.  Endorsed anxiety at 3/10. When assessed for depression, pt endorsed feelings of hopelessness. Did not provide a numerical value to this feeling. Cooperative. Speech is organized.      Pt initially endorsed having chest pain. Subsequently checked pt vitals, which were stable. Upon further assessment, pt stated that it was actually rib pain and not chest pain. Given PRN Tylenol. Upon reassessment, pt stated that the pain was gone and attributed the pain to sleeping in a particular position.     Pt being followed by IM for a vaginal lesion. Pt reporting difficulty urinating. Denied pain urinating.      B, 313    Given PRN Zyprexa for agitation, medication minimally effective. Pt declined hydrozyzine for reported anxiety, stating that medication was not effective.     Continues to be on 1:1 for self injury risk. No acute behavioral outburst. Took scheduled medication without issue.     /88 (BP Location: Left arm, Patient Position: Sitting, Cuff Size: Adult Regular)   Pulse 100   Temp 97.9  F (36.6  C) (Temporal)   Resp 16   Ht 1.676 m (5' 6\")   Wt 109.5 kg (241 lb 4.8 oz)   SpO2 100%   BMI 38.95 kg/m                " Pulmonary Clinic Follow-up Note     Patient: Raleigh Gonzalez    YOB: 1944    MRN: [id number]      Date of Service: 5/17/2024    Reason for followup:   pulmonary hypertension, obstructive sleep apnea    History of Present Illness:  Raleigh Gonzalez is a pleasant 79-year-old male past medical history of pulmonary hypertension, SHIKHA, CHF, history of recurrent PE on Coumadin, CKD stage 3, presented today for scheduled follow-up.      Patient was previously established with Dr. Dubois last evaluated back in 12/2022.  At that time he was post right heart catheterization which revealed mild pulmonary hypertension.  This was deemed secondary to obstructive sleep apnea and less likely chronic thromboembolic pulmonary embolism.  Plan was to referred to sleep medicine and start patient on PAP therapy and repeat echocardiogram after 6 months of therapy to determine future workup if needed. He underwent sleep study without significant sleep apnea. RHC 10/2022 with mild pulmonary HTN and repeat Echo 2023 with significantly improved right sided pressure.     Patient quit smoking years ago. Used to be in . Use a walker for ambulation and lives with caregiver who provides 24/7 assistance.     Interval History:  No significant change since last evaluated. Presenting today again with his care giver. No respiratory change. Per his care giver, have sedentary life at home and low desire to do anything.     Previous Pulmonary Work up:    PULMONARY FUNCTION TEST:   Osmany 2014: FVC at 67% predicted, FEV1 at 71% predicted, ratio at 74.    Six Minute Walk:   4/2023: Distance ambulated 180 m   Oxygen saturation at 96% on room air    11/2022: Distance ambulated 180 m   Oxygen saturation at 96%    IMAGING:  CXR:   2022: no acute cardiopulmonary process, hilar congestion noted    CT Chest:  2022: negative for significant parenchymal lung disease, engorged pulmonary vessels with large pulmonary artery    CARDIAC Work up:   Echo:    5/2024: remain with flattended septum, unable to assess RVSP, normal EF,     03/2023: Normal LV function at 67%, grade 1 diastolic dysfunction, RVSP at 25 with normal RV size and function.  RVSP has improved from 83 on most recent echocardiogram    Right Heart Cath   Aorta saturation:  96%   PA saturation:  59.9%  Right atrium:5/4/2  Right ventricle:48/1  Pulmonary artery:  49/17/30  Pulmonary capillary wedge:  8/8/6  Trans pulmonary gradient:  24   Cardiac output by thermal dilution:  4.85   Cardiac index by thermodilution:  2.14   Ro cardiac output:  4.24   Ro cardiac index:  1.87   Pulmonary vascular resistance:  4.9 woods units    REVIEW OF SYSTEMS:   Per HPI for pertinent negatives and positives.    Home Medications:   Current Outpatient Medications   Medication Sig Dispense Refill    warfarin (COUMADIN) 5 MG tablet Take one-half tablet by mouth every Monday and Thursday and take 1 tablet on all other days 80 tablet 1    potassium CHLORIDE (KLOR-CON M) 20 MEQ janie ER tablet Take 1 tablet by mouth in the morning and 1 tablet before bedtime. 60 tablet 8    pantoprazole (PROTONIX) 40 MG tablet Take 1 tablet by mouth every 12 hours. 180 tablet 3    furosemide (LASIX) 40 MG tablet Take 0.5 tablets by mouth daily. 90 tablet 3    atorvastatin (Lipitor) 40 MG tablet Take 1 tablet by mouth every evening. 90 tablet 3    fluticasone (FLONASE) 50 MCG/ACT nasal spray Spray 2 sprays in each nostril daily as needed (for nasal congestion). 16 g 3    tamsulosin (FLOMAX) 0.4 MG Cap Take 1 capsule by mouth daily after a meal. 30 capsule 11    ammonium lactate (Lac-Hydrin Five) 5 % lotion Apply topically as needed for Dry Skin. Apply to lower extremities and feet 70 g 0    nystatin (MYCOSTATIN) 525999 UNIT/GM cream Apply topically as needed (dry skin fungal infection). To groin folds AM and PM. Wash first, dry thoroughly and apply 70 g 0    folic acid (FOLATE) 400 MCG tablet Take 2 tablets by mouth daily. 180 tablet 3     acetaminophen (TYLENOL) 500 MG tablet Take 1,000 mg by mouth 2 times daily as needed. Indications: mild pain      Ferrous Sulfate (Iron) 325 (65 Fe) MG Tab Take 1 tablet by mouth daily. 30 tablet 1    triamcinolone (ARISTOCORT) 0.1 % cream Apply topically 2 times daily as needed (venous stasis dermatitis, itching to LE's). 80 g 5    loperamide (IMODIUM) 2 MG capsule Take 1 capsule by mouth 4 times daily as needed for Diarrhea. 60 capsule 0    cholecalciferol (VITAMIN D) 25 mcg (1,000 units) tablet Take 1,000 Units by mouth daily.       polyethylene glycol (GLYCOLAX, MIRALAX) packet Take 17 g by mouth daily as needed (for constipation).        No current facility-administered medications for this visit.         ALLERGIES:  No Known Allergies      Active Ambulatory Problems     Diagnosis Date Noted    Primary hypertension 06/12/2013    Chronic combined systolic and diastolic congestive heart failure  (CMD) 06/12/2013    Colon cancer  (CMD)     Duodenal ulcer 06/12/2013    CATHERINE (iron deficiency anemia) 06/12/2013    Pituitary tumor 06/18/2013    Visual field defect, unspecified 06/20/2013    Nuclear senile cataract of both eyes 06/20/2013    Encounter for therapeutic drug monitoring 07/12/2013    Venous stasis of lower extremity 12/17/2013    OA (osteoarthritis) 03/12/2015    Adrenal tumor 03/12/2015    Hypogonadotropic hypogonadism  (CMD) 03/13/2015    Fungal toenail infection 08/14/2015    Recurrent pulmonary embolism  (CMD) 01/15/2016    Vitamin D deficiency 04/01/2016    PVD (peripheral vascular disease) (CMD) 11/10/2016    Myopic astigmatism of both eyes 07/11/2017    Presbyopia 07/11/2017    Insufficiency of tear film of both eyes 07/11/2017    Basal cell carcinoma of left forearm 11/21/2017    Lymphedema of both lower extremities 08/08/2018    Weakness generalized 01/27/2020    Stage 3a chronic kidney disease (CMD) 01/27/2020    Folic acid deficiency 02/03/2020    Glaucoma suspect of both eyes 11/18/2020     Lumbosacral spondylosis without myelopathy 12/23/2021    Hyperbilirubinemia 08/05/2022    Calculus of gallbladder 08/05/2022    Adrenal insufficiency  (CMD) 08/19/2022    Urinary retention with incomplete bladder emptying 08/19/2022    CTEPH (chronic thromboembolic pulmonary hypertension)  (CMD) 12/02/2022    Weakness 11/12/2023    Long term (current) use of anticoagulants 07/12/2013    Dysphagia 12/06/2023     Resolved Ambulatory Problems     Diagnosis Date Noted    Pulmonary embolism  (CMD) 06/12/2013    Anticoagulated on Coumadin 07/12/2013    Venous stasis ulcers 12/17/2013    Bilateral cellulitis of lower leg 07/18/2014    Pseudomonas aeruginosa colonization 07/25/2014    Right heart failure due to pulmonary hypertension (CMS/HCC) 09/17/2015    Pulmonary embolism, other 01/15/2016    Acute posthemorrhagic anemia 11/03/2016    Bilateral cellulitis of lower leg 11/03/2016    Melena 11/03/2016    Anticoagulation management encounter 12/26/2016    Cancer of skin 11/08/2017    Hyponatremia 11/21/2017    Other chronic pulmonary embolism with acute cor pulmonale  (CMD) 11/26/2017    Ulcer of right leg, limited to breakdown of skin  (CMD) 08/08/2018    Acute UTI 08/10/2018    Traumatic rhabdomyolysis (CMD) 08/10/2018    Weakness 08/10/2018    Acute kidney injury (CMD) 08/10/2018    Decubitus ulcer of right hip, unstageable  (CMD) 09/11/2018    Hyperkalemia 01/27/2020    Clostridium difficile diarrhea 02/09/2020    Leg ulcer, left, limited to breakdown of skin  (CMD) 07/08/2020    Generalized weakness 08/03/2022    Severe sepsis with acute organ dysfunction due to Gram negative bacteria  (CMD) 08/05/2022    Pulmonary hypertension due to left heart disease  (CMD) 08/05/2022    Pulmonary hypertension due to left heart disease  (CMD) 08/05/2022    CHF (congestive heart failure)  (CMD) 06/12/2013    Sinus tachycardia 11/12/2023     Past Medical History:   Diagnosis Date    Glaucoma     High cholesterol     HTN  (hypertension)     Nocturnal hypoxemia due to pulmonary hypertension  (CMD) 09/17/2015    Pulmonary hypertension  (CMD)     TIA (transient ischemic attack)     Ulcer     Urinary incontinence              PHYSICAL EXAM:  Vital Signs (most recent):   Visit Vitals  /62   Pulse 74   Ht 6' (1.829 m)   Wt 103 kg (227 lb)   SpO2 94%   BMI 30.79 kg/m²     Constiutional: Appears stated age. NAD. Patient speaks in full sentences.  HEENT: no nasal discharge, oropharynx without erythema or exudate  Neck: trachea midline, supple, no cervical or supraclavicular lymphadenopathy   Cardiovascular: RRR, normal S1 S2, no murmurs, no JVD. No clubbing, cyanosis. Unable to assess lower extrmeity with compression stocking    Respiratory: Normal breath sounds heard in lung fields. No crackles or wheeze.Normal rate. No retractions or increased work of breathing.  Musculoskeletal: ROM and motor strength grossly normal.   Skin: no rashes, jaundice or other lesions  Neurologic: no gross motor or sensory deficits    LABS:  Lab Results   Component Value Date    SODIUM 138 01/03/2024    POTASSIUM 4.6 01/03/2024    CHLORIDE 101 01/03/2024    CO2 28 01/03/2024    BUN 30 (H) 01/03/2024    CREATININE 1.27 (H) 01/03/2024    GLUCOSE 88 01/03/2024     Lab Results   Component Value Date    WBC 5.8 11/17/2023    HCT 37.9 (L) 11/17/2023    HGB 12.2 (L) 11/17/2023     (L) 11/17/2023     Hemoglobin A1C (%)   Date Value   11/12/2023 5.6     INR (no units)   Date Value   04/30/2024 2.3   04/02/2024 2.1     TSH (mcUnits/mL)   Date Value   11/13/2023 1.267     Lab Results   Component Value Date    AST 27 11/14/2023    GPT 16 11/14/2023    ALKPT 50 11/14/2023    BILIRUBIN 0.5 11/14/2023     CPK (Units/L)   Date Value   08/14/2018 799 (H)     No results found for: \"DIMER\"  Lab Results   Component Value Date    RAPDTR <0.02 11/03/2016         ASSESSMENT:   Pulmonary hypertension, Mild to Moderate   Nocturnal hypoxia.       IMPRESSION / PLAN:  Stable  since his last visit with stable 6 minute walk test and no change from prior study.   Pulmonary HTN group III and less likely group 1. Has documented nocturnal hypoxia previously and was evaluated in sleep clinic.   Will refer back to Sleep Clinic, last note from Dr. Amezquita requested follow up in clinic to discuss study results and apnea treatment. Patient states willing to use PAP therapy if needed.   Continue diuresis per cardiology   Will follow up in 1 year with repeat 6MWT. If remain stable may consider discharge from clinic.     Orders Placed This Encounter    SERVICE TO SLEEP MEDICINE    PFT     Visit time: 30 mins including pre charting, reviewing previous records, interpreting chest imaging and pulmonary diagnostics in addition to face to face interview, physical exam, counseling and documenting this encouncter.     Stanley Christy MD.   Pulmonary & Critical Care Medicine

## 2024-05-22 ENCOUNTER — VIRTUAL VISIT (OUTPATIENT)
Dept: PSYCHIATRY | Facility: CLINIC | Age: 34
End: 2024-05-22
Attending: PSYCHOLOGIST
Payer: MEDICARE

## 2024-05-22 ENCOUNTER — TELEPHONE (OUTPATIENT)
Dept: FAMILY MEDICINE | Facility: CLINIC | Age: 34
End: 2024-05-22
Payer: MEDICARE

## 2024-05-22 DIAGNOSIS — F25.0 SCHIZOAFFECTIVE DISORDER, BIPOLAR TYPE (H): Primary | ICD-10-CM

## 2024-05-22 DIAGNOSIS — F41.9 ANXIETY DISORDER, UNSPECIFIED TYPE: ICD-10-CM

## 2024-05-22 DIAGNOSIS — Z62.810 PERSONAL HISTORY OF SEXUAL ABUSE IN CHILDHOOD: ICD-10-CM

## 2024-05-22 PROCEDURE — 96130 PSYCL TST EVAL PHYS/QHP 1ST: CPT | Mod: 95 | Performed by: PSYCHOLOGIST

## 2024-05-22 PROCEDURE — 96131 PSYCL TST EVAL PHYS/QHP EA: CPT | Mod: 95 | Performed by: PSYCHOLOGIST

## 2024-05-22 PROCEDURE — 96136 PSYCL/NRPSYC TST PHY/QHP 1ST: CPT | Mod: 95 | Performed by: PSYCHOLOGIST

## 2024-05-22 PROCEDURE — 96137 PSYCL/NRPSYC TST PHY/QHP EA: CPT | Mod: 95 | Performed by: PSYCHOLOGIST

## 2024-05-22 PROCEDURE — 90791 PSYCH DIAGNOSTIC EVALUATION: CPT | Mod: 95 | Performed by: PSYCHOLOGIST

## 2024-05-22 NOTE — TELEPHONE ENCOUNTER
Patient called and stated that they have not had period in well over a year.  He is having one now, and stated that it is slightly heavier than their normal one, they used to have.    He denies any clots, lightheadedness, dizziness, going through a pad an hour, foul discharge, or any other symptoms or concerns.    Routed to PCP to please advise.    Tran CHOI RN  Triage Nurse  [facility]
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
## 2024-05-28 ENCOUNTER — VIRTUAL VISIT (OUTPATIENT)
Dept: FAMILY MEDICINE | Facility: CLINIC | Age: 34
End: 2024-05-28
Payer: MEDICARE

## 2024-05-28 DIAGNOSIS — G62.9 NEUROPATHY: Primary | ICD-10-CM

## 2024-05-28 PROBLEM — R56.9 SEIZURE-LIKE ACTIVITY (H): Status: RESOLVED | Noted: 2021-03-23 | Resolved: 2024-05-28

## 2024-05-28 PROCEDURE — 99213 OFFICE O/P EST LOW 20 MIN: CPT | Mod: 95 | Performed by: FAMILY MEDICINE

## 2024-05-28 RX ORDER — GABAPENTIN 300 MG/1
300 CAPSULE ORAL 3 TIMES DAILY
Qty: 270 CAPSULE | Refills: 3 | Status: SHIPPED | OUTPATIENT
Start: 2024-05-28 | End: 2024-06-20

## 2024-05-28 ASSESSMENT — ENCOUNTER SYMPTOMS
FATIGUE: 0
DECREASED CONCENTRATION: 0
HALLUCINATIONS: 0
SHORTNESS OF BREATH: 0
EYE PAIN: 0
ENDOCRINE NEGATIVE: 1
BACK PAIN: 1
COLOR CHANGE: 0
WHEEZING: 0
SEIZURES: 0
APPETITE CHANGE: 0
COUGH: 0
FEVER: 0
GASTROINTESTINAL NEGATIVE: 1
NERVOUS/ANXIOUS: 0
PALPITATIONS: 0
EYE DISCHARGE: 0
HEADACHES: 0
HEMATOLOGIC/LYMPHATIC NEGATIVE: 1
CONFUSION: 0
AGITATION: 0
DIZZINESS: 0
ACTIVITY CHANGE: 0
ALLERGIC/IMMUNOLOGIC NEGATIVE: 1
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
## 2024-05-28 NOTE — TELEPHONE ENCOUNTER
Attempted to call patient with number on file to relay pcp's message below with no answer, left voicemail to call clinic back at [phone].    Jie Walters RN on 5/28/2024 at 9:15 AM

## 2024-05-29 DIAGNOSIS — F25.9 SCHIZOAFFECTIVE DISORDER, CHRONIC CONDITION WITH ACUTE EXACERBATION (H): ICD-10-CM

## 2024-05-29 RX ORDER — ARIPIPRAZOLE 5 MG/1
5 TABLET ORAL EVERY MORNING
Qty: 30 TABLET | Refills: 10 | OUTPATIENT
Start: 2024-05-29

## 2024-05-29 RX ORDER — PAROXETINE 10 MG/1
10 TABLET, FILM COATED ORAL DAILY
Qty: 30 TABLET | Refills: 10 | OUTPATIENT
Start: 2024-05-29

## 2024-05-29 RX ORDER — QUETIAPINE FUMARATE 50 MG/1
50 TABLET, FILM COATED ORAL AT BEDTIME
Qty: 30 TABLET | Refills: 10 | OUTPATIENT
Start: 2024-05-29

## 2024-05-29 RX ORDER — FLUPHENAZINE HYDROCHLORIDE 1 MG/1
2 TABLET ORAL 2 TIMES DAILY
Qty: 120 TABLET | Refills: 10 | OUTPATIENT
Start: 2024-05-29

## 2024-05-29 RX ORDER — LITHIUM CARBONATE 450 MG
900 TABLET, EXTENDED RELEASE ORAL AT BEDTIME
Qty: 60 TABLET | Refills: 10 | OUTPATIENT
Start: 2024-05-29

## 2024-05-30 NOTE — TELEPHONE ENCOUNTER
Called patient.    He stated that he has been taking his testosterone as scheduled.      Tran BENITEZN RN  Triage Nurse  [facility]

## 2024-06-03 ENCOUNTER — MYC MEDICAL ADVICE (OUTPATIENT)
Dept: FAMILY MEDICINE | Facility: CLINIC | Age: 34
End: 2024-06-03

## 2024-06-03 ENCOUNTER — VIRTUAL VISIT (OUTPATIENT)
Dept: FAMILY MEDICINE | Facility: CLINIC | Age: 34
End: 2024-06-03
Payer: MEDICARE

## 2024-06-03 DIAGNOSIS — F60.3 BORDERLINE PERSONALITY DISORDER (H): Chronic | ICD-10-CM

## 2024-06-03 DIAGNOSIS — I10 HYPERTENSION, UNSPECIFIED TYPE: ICD-10-CM

## 2024-06-03 DIAGNOSIS — G89.29 CHRONIC LEFT-SIDED LOW BACK PAIN WITH LEFT-SIDED SCIATICA: ICD-10-CM

## 2024-06-03 DIAGNOSIS — E11.65 TYPE 2 DIABETES MELLITUS WITH HYPERGLYCEMIA, WITH LONG-TERM CURRENT USE OF INSULIN (H): ICD-10-CM

## 2024-06-03 DIAGNOSIS — Z79.4 TYPE 2 DIABETES MELLITUS WITH HYPERGLYCEMIA, WITH LONG-TERM CURRENT USE OF INSULIN (H): ICD-10-CM

## 2024-06-03 DIAGNOSIS — G62.9 NEUROPATHY: ICD-10-CM

## 2024-06-03 DIAGNOSIS — Z78.9 FEMALE-TO-MALE TRANSGENDER PERSON: ICD-10-CM

## 2024-06-03 DIAGNOSIS — F25.0 SCHIZOAFFECTIVE DISORDER, BIPOLAR TYPE (H): ICD-10-CM

## 2024-06-03 DIAGNOSIS — F41.1 GAD (GENERALIZED ANXIETY DISORDER): Primary | ICD-10-CM

## 2024-06-03 DIAGNOSIS — M54.42 CHRONIC LEFT-SIDED LOW BACK PAIN WITH LEFT-SIDED SCIATICA: ICD-10-CM

## 2024-06-03 DIAGNOSIS — F90.9 ATTENTION DEFICIT HYPERACTIVITY DISORDER (ADHD), UNSPECIFIED ADHD TYPE: ICD-10-CM

## 2024-06-03 DIAGNOSIS — Z30.2 REQUEST FOR STERILIZATION: ICD-10-CM

## 2024-06-03 PROCEDURE — 99214 OFFICE O/P EST MOD 30 MIN: CPT | Mod: 95 | Performed by: NURSE PRACTITIONER

## 2024-06-03 PROCEDURE — G2211 COMPLEX E/M VISIT ADD ON: HCPCS | Mod: 95 | Performed by: NURSE PRACTITIONER

## 2024-06-03 RX ORDER — LANCETS
EACH MISCELLANEOUS
Qty: 100 EACH | Refills: 3 | Status: CANCELLED | OUTPATIENT
Start: 2024-06-03

## 2024-06-03 RX ORDER — METHYLPREDNISOLONE 4 MG
TABLET, DOSE PACK ORAL
Qty: 21 TABLET | Refills: 0 | Status: SHIPPED | OUTPATIENT
Start: 2024-06-03 | End: 2024-06-17

## 2024-06-03 RX ORDER — KETOROLAC TROMETHAMINE 10 MG/1
10 TABLET, FILM COATED ORAL EVERY 6 HOURS PRN
Qty: 20 TABLET | Refills: 0 | Status: CANCELLED | OUTPATIENT
Start: 2024-06-03

## 2024-06-03 NOTE — LETTER
[facility]  [address]  [place] [address]  Phone: [phone]    June 5, 2024        Prakash Prasad  [address]  [place] [address]      RE: Prakash Prasad      Requested that I send a letter to his current psychiatrist regarding use of stimulants related to chronic medical conditions.  Was recently hospitalized for atrial fibrillation RVR.  Felt due to be related to excessive alcohol use.  Converted on own.  Prakash also does have history of hypertension.  Hypertension is not always well-controlled as medication compliance varies.  Lastly, history of diabetes -no contraindication to using stimulants.  Medically, would be acceptable for low-dose stimulant.  Of note, does have history of polysubstance abuse including stimulant abuse.  Most recent psychiatrist documented on April 19, 2024 that patient had impulsive ingestion of 6 Adderall.    Please contact me for questions or concerns.      Sincerely,    Becki JENKINS

## 2024-06-03 NOTE — PROGRESS NOTES
Prakash is a 33 year old who is being evaluated via a billable video visit.    How would you like to obtain your AVS? MyChart  If the video visit is dropped, the invitation should be resent by: Text to cell phone: [phone]  Will anyone else be joining your video visit? No      Assessment & Plan     AVA (generalized anxiety disorder)  Continue to follow closely with psychiatry    Type 2 diabetes mellitus with hyperglycemia, with long-term current use of insulin (H)  Most recent A1c from March reviewed-7.8.  Reinforced importance of medication compliance.    Chronic left-sided low back pain with left-sided sciatica  Most recent MRI reviewed.  Prescription given for Medrol-educated on use and possible side effects.  Encouraged to complete MRI prior to seeing spine.  Will plan to increase dose of gabapentin on Monday the 10th to 600 mg 3 times daily  Hospitalization notes from April 19, 2024 does show gabapentin overdose, uncertain if it was accidental or intentional.  Request for narcotic pain medication denied  - methylPREDNISolone (MEDROL DOSEPAK) 4 MG tablet therapy pack; Follow Package Directions  - MR Lumbar Spine w/o & w Contrast; Future  - Spine  Referral; Future    Request for sterilization  - Ob/Gyn  Referral; Future    Female-to-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 person  - Ob/Gyn  Referral; Future    Hypertension, unspecified type  Most recent blood pressure is reviewed.  Overall controlled-does have history of very good medication compliance.  Plan to continue current medications and dosages for now.    Attention deficit hyperactivity disorder (ADHD), unspecified ADHD type  Reports current psychiatrist is requesting letter of support and safety of stimulants  Letter written  Borderline personality disorder (H)  Continue to follow closely with psychiatry    Schizoaffective disorder, bipolar type (H)  Continue to follow closely with psychiatry.    The longitudinal plan of care for the  diagnosis(es)/condition(s) as documented were addressed during this visit. Due to the added complexity in care, I will continue to support Prakash in the subsequent management and with ongoing continuity of care.     Davin Randall is a 33 year old, presenting for the following health issues:  RECHECK      6/3/2024     9:48 AM   Additional Questions   Roomed by MP         6/3/2024     9:48 AM   Patient Reported Additional Medications   Patient reports taking the following new medications None per patient     Video Start Time: 1:16 PM    HPI   Following up on: back pain  Last visit this was discussed: 5/28/24 with Kehinde Domingo DO   Progression of Symptoms:  same  Signs & Symptoms: back pain, sciatica into both legs  Taking Medication as prescribed: yes  Side Effects:  None  Medication Helping Symptoms:  yes          Objective           Vitals:  No vitals were obtained today due to virtual visit.    Video visit completed today for follow-up.  Is currently on vacation.  Has been 4 wheeling, rockclimbing, doing water sports.    Stopped gabapentin on hold a few months ago.  Did not really feel like it was working.  Has noticed since stopped taking it that pain to back has gotten much worse.  Had visit with alternate provider on May 28, gabapentin restarted at 300 mg daily.  Does feel like since restarted it has helped to decrease the pain some but overall lower back pain has been increased.  Was previously on 1200 mg 3 times per day.  Lower back pain to left side of back and pain that will go down the buttock and down her back of the leg into foot. Has pain to front and top of right thigh.  Did fall when was playing a game out in the yard, twisted ankle and fell on back.  This was yesterday.  Pain is worse today than it was yesterday.  No saddle anesthesia.  No loss of control of bowel or bladder.  Has been taking Tylenol and ibuprofen and that is it not really helping.  Would like to get back into spine surgery.   Does not like having to deal with back pain.  Requesting narcotic pain medication.    Got period for 1 day, has not had any bleeding since. Was having cramps. It has been 3-4 years since had any bleeding.  Has been taking testosterone routinely.  Has no future plans for children.    New psychiatrist at [facility] needs a letter to [place] for stimulants due to having diabetes, atrial fibrillation and hypertension.    Would like to find a new provider to prescribe testosterone.  Every time that has appointment is always with someone you will do need to utilizing residents.      Physical Exam  Constitutional:       General: He is not in acute distress.     Appearance: Normal appearance. He is not toxic-appearing.   HENT:      Nose: No congestion.   Eyes:      General: Lids are normal.   Pulmonary:      Effort: Pulmonary effort is normal. No tachypnea, bradypnea or respiratory distress.   Skin:     Coloration: Skin is not ashen, cyanotic, jaundiced or pale.   Neurological:      Mental Status: He is alert.   Psychiatric:         Mood and Affect: Mood normal.         Speech: Speech normal.         Behavior: Behavior normal. Behavior is cooperative.              Video-Visit Details    Type of service:  Video Visit   Video End Time:1:42 PM  Originating Location (pt. Location): Home    Distant Location (provider location):  On-site  Platform used for Video Visit: Evens  Signed Electronically by: BROOKLYN Cruz CNP

## 2024-06-05 ENCOUNTER — MYC MEDICAL ADVICE (OUTPATIENT)
Dept: FAMILY MEDICINE | Facility: CLINIC | Age: 34
End: 2024-06-05
Payer: MEDICARE

## 2024-06-05 PROBLEM — F64.9 GENDER DYSPHORIA: Chronic | Status: RESOLVED | Noted: 2021-02-16 | Resolved: 2024-06-05

## 2024-06-05 RX ORDER — GABAPENTIN 600 MG/1
600 TABLET ORAL 3 TIMES DAILY
Qty: 90 TABLET | Refills: 2 | Status: SHIPPED | OUTPATIENT
Start: 2024-06-05 | End: 2024-08-07

## 2024-06-05 NOTE — PATIENT INSTRUCTIONS
If you have any questions regarding your visit, Please contact your care team.     [employer]: [phone]    To Schedule an Appointment 24/7  Call: [phone]LEPHONE NUM[facility]     Kevin Pan MD  Medical Director        Shayna-SRINIVAS Reyes-RN  Ayana Lepe-Surgery Scheduler  Ny-Surgery Scheduler               Tuesday-[place]  7:30 a.m-4:30 p.m    Thursday-[place]  7:30 a.m-4:30 p.m    Typical Surgery Days: Tuesday or Friday [facility]  [address] [place] [address]  PH: [phone]    Imaging Scheduling all locations  PH: [phone]     [facility]  [address] [place] [address] [place] [address]  PH: [phone]    [facility]  [address] [place] [address]  PH: [phone] Fax      **Surgeries** Our Surgery Schedulers will contact you to schedule. If you do not receive a call within 3 business days, please call [phone].    Urgent Care locations:  [facility] Monday-Friday  10 am - 8 pm  Saturday and Sunday   9 am - 5 pm  Monday-Friday   10 am - 8 pm  Saturday and Sunday   9 am - 5 pm   [phone]   If you need a medication refill, please contact your pharmacy. Please allow 3 business days for your refill to be completed.  As always, Thank you for trusting us with your healthcare needs!    see additional instructions from your care team below

## 2024-06-10 NOTE — TELEPHONE ENCOUNTER
Pt calling- he needs his letter faxed to psychiatrists office.  Pt will send name of facility, psychiatrist and fax number through [employer] message      SRINIVAS Ledesma    Triage Nurse  [facility]

## 2024-06-11 ENCOUNTER — VIRTUAL VISIT (OUTPATIENT)
Dept: FAMILY MEDICINE | Facility: CLINIC | Age: 34
End: 2024-06-11
Payer: MEDICARE

## 2024-06-11 DIAGNOSIS — F64.9 GENDER INCONGRUENCE: ICD-10-CM

## 2024-06-11 PROCEDURE — 99214 OFFICE O/P EST MOD 30 MIN: CPT | Mod: 95 | Performed by: FAMILY MEDICINE

## 2024-06-11 RX ORDER — TESTOSTERONE GEL, 1% 10 MG/G
50 GEL TRANSDERMAL DAILY
Qty: 30 PACKET | Refills: 2 | Status: SHIPPED | OUTPATIENT
Start: 2024-06-11 | End: 2024-06-17

## 2024-06-11 NOTE — PROGRESS NOTES
"Prakash is a 33 year old who is being evaluated via a billable video visit.          Assessment & Plan     Gender incongruence  Pt currently on topical testosterone, 50mg. Has had some interruptions in HRT but tells me he has been taking consistently for the past 1 year. No side effects. Seeing slow changes. Hasn't had testosterone level checked on this current dose - wrote future lab order for this to be done in the next few weeks. Advised patient we can use this information to guide any dose increases or changes and determine follow-up plans. Did have CMP/CBC/lipids done in November 2023 which were reviewed today and normal.     3 month refill provided, will reassess follow-up need based on labs.     - testosterone (ANDROGEL/TESTIM) 50 MG/5GM (1%) topical gel  Dispense: 30 packet; Refill: 2  - Testosterone total    Return in about 4 weeks (around 7/9/2024).    Subjective   Prakash is a 33 year old, presenting for the following health issues:  Refill Request (Medication refill)      6/11/2024     2:39 PM   Additional Questions   Roomed by jonathan   Accompanied by self         6/11/2024    Information    services provided? No     Video Start Time: 2:43pm    HPI               Last labs: 11/2023 (CMP, CBC, lipids) - reviewed and normal.   Last testosterone: 5/2023 - 51.     Taking consistently for the past 1 year  Hasn't really noticed much in terms of changes. Maybe some hair growth.   Using gel - hasn't noticed any side effects.  Using 50mg packets; applies daily.           Objective    Vitals - Patient Reported  Weight (Patient Reported): 104.3 kg (230 lb)  Height (Patient Reported): 167.6 cm (5' 6\")  BMI (Based on Pt Reported Ht/Wt): 37.12        Physical Exam   GENERAL: alert and no distress  EYES: Eyes grossly normal to inspection.  No discharge or erythema, or obvious scleral/conjunctival abnormalities.  RESP: No audible wheeze, cough, or visible cyanosis.    SKIN: Visible skin clear. No " significant rash, abnormal pigmentation or lesions.  NEURO: Cranial nerves grossly intact.  Mentation and speech appropriate for age.  PSYCH: Appropriate affect, tone, and pace of words      Video-Visit Details    Type of service:  Video Visit   Video End Time:2:51 PM  Originating Location (pt. Location): Home    Distant Location (provider location):  On-site  Platform used for Video Visit: Evens  Signed Electronically by: Anabel Dominique DO

## 2024-06-11 NOTE — PATIENT INSTRUCTIONS
Masculinizing Hormone therapy for Gender Affirmation    How do hormones work?  Hormones are chemicals in your body. These chemicals control many of your body s functions, such as, growth, sex drive, hunger, fat burning, ability to have children, and more.   You have 3 main types of sex hormones:   Androgens, which includes testosterone  Estrogens  Progesterone   Generally, people assigned male at birth have higher androgen levels. People assigned female at birth have higher estrogen and progesterone.     What hormone medications do I take for transition?  To help make you look more masculine, you will need to take testosterone. Testosterone is available in 5 forms.   Short-acting injectable (intramuscular or subcutaneous)  testosterone is the least expensive and provides quicker effects. However, some people find this form more difficult to use because of mood swings.   Transdermal (gels, creams, or skin patches) testosterone provides a steady amount of testosterone daily and is less likely to cause mood swings  Pellets are recommended only if you are fully transitioned physically and have been stable on testosterone for several years. During a clinic visit, pellets of hormone are inserted into the fat on your upper buttocks. Usually you will have pellets placed 3 times a year. Not yet available at [facility].  Long acting injectable testosterone provides a steady amount of testosterone and fewer mood swings. Long action injections are recommended only if you are fully transitioned physically and have been stable on testosterone for several years. Injections are administered in clinic usually 5 times a year. Not yet available at [facility].  Buccal testosterone is a tablet that you place on the gums in your mouth. It is not widely available. Currently there is not enough medical evidence to know how well it works.   Testosterone pills taken by mouth are not recommended. Most people are not able to absorb  the hormone well enough into the body. The pills can also cause problems for your liver.     What changes can I expect from hormone therapy?  The amount of changes is different from person to person. Some of these changes may be permanent. Others may not be permanent. See the table on the last page for more information.     Permanent changes   Hair growth on your face, arms, legs, chest, back, and stomach. Hair growth begins within 2 months of starting testosterone. Usually the hair growth begins on your legs and back, chest and stomach, followed by hair on your face. To grow a full beard may take 4-5 years or more. Once the testosterone stimulates your hair follicles, you will need to use electrolysis or laser hair removal if you chose to remove any of the hair.   Growth of your clitoris. Usually, growth of your clitoris begins within 2 months of starting testosterone. You may experience soreness as your clitoris grows.   Voice change. About 3 to 6 months after starting testosterone, your voice will begin to deepen. Usually, the full range of voice changes occurs within a year or so  Loss of hair on the head. Male pattern balding may occur.     Changes that may be permanent  Stop ovulating and having periods. you may have some bleeding from time to time. Prescription medications including medroxyprogesterone (Depo-Provera), levonorgestrel IUDs (Mirena) or etonogestral implants (Nexplanon), can help stop bleeding.   You can start these medications when you start testosterone, or you can wait to see if you are having bleeding after a few months.  You can use these medications for birth control if you have sex with people who make sperm.  Testosterone is not birth control- Do not get pregnant while on testosterone as the hormone can cause birth defects.   Decrease in fertility (ability to have children). Hormones may cause the decrease in your fertility to be permanent. If you want to preserve your fertility, consider  preserving your eggs before starting hormones.    Other possible side effects of hormone therapy  Menopause symptoms. Due to the decrease in estrogen, you may experience menopause symptoms such as hot flashes, headaches, and mood swings. These symptoms usually improve in a few weeks to months. You may also experience vaginal dryness and a need to urinate more often. These changes can sometimes last several years.   Acne. You may develop or see an increase in acne. Occasionally, acne can be severe enough to cause scarring. Acne usually improves after a few years.   Increase in size of muscles. If you exercise often, muscle increase may be greater.   Increase in fat on your stomach and decrease of fat on your hips and thighs.  Increase in sex drive. For most people, sex drive evens out over time.   Increase in mood swings. An increase is more likely if you are doing short-acting testosterone injections 2 or more weeks apart.   If you have bipolar disorder or other mood disorders, testosterone can make your mood disorder worse.   If you have ADHD, testosterone can worsen symptoms, such as difficulty concentrating.   If you have schizophrenia or other mental health concerns, testosterone has unknown effects on your mental health.  Increase in red blood cells. An increase in red blood cells can cause your blood to flow more slowly.   Changes in your risk factors for heart disease. Could have increased risk for heart attack or stroke or both compared with your risk before taking testosterone  Increase in weight-especially in the thighs, hips, waist, and butt.  Increase in risk for diabetes  Increase in triglycerides  Increase in blood pressure  Increase in risk for blood clots if blood counts too high-which can happen in your legs or lungs.  Decrease in bone density as your estrogen decreases. Testosterone will give some protection from bone loss. To help prevent too much bone loss, do weight-bearing exercise, such as  walking. We recommend you take 4268-9759 milligrams (mg) of calcium daily and 400 to 1000 international units (IU) of vitamin D daily.   Increased risk of STDs- testosterone can lead to my cervix and the walls of the vagina becoming more fragile, and that this can lead to tears or abrasions that increase the risk of sexually transmitted infections (including HIV) if having vaginal sex  The effect of testosterone on the risk for breast cancer is unknown. Testosterone is unlikely to increase your risk for uterine, cervical, or ovarian cancer unless you take very high doses and the testosterone turns to estrogen. Scientists do not have much research information about the long-term risks of hormone therapy for gender affirmation.     How can I decrease the risks of hormone therapy?   Get at least 30 minutes of physical activity most days  Eat whole grains, vegetables, fruits and low-fat protein   Include calcium and vitamin D in your diet to help you keep your bones healthy- talk to your clinician about recommended amounts.   Do not smoke or use tobacco. Tobacco raises blood pressure, can cause buildup of fat in your arteries and can increase your risk of blood clots.   Limit alcohol use to not more than 2 standard drinks a day.   Do not use illegal drugs or medications other than what your clinician has prescribed.   Get emotional and social support-Ask your clinician for a referral to a psychotherapist or  who specializes in gender affirmation, if you do not already have one, and spend time with supportive family and friends.     How long do I need to take hormone therapy?  Most people on hormone therapy for gender affirmation take hormones for the rest of their lives, or for as long as they desire the changes that occur.   After you begin using hormones, you will likely have follow-up appointments including blood tests, with your clinician, every 2 to 3 months for the first year. After changes stabilize  and you have no side effects, you will see your clinician less often. The length of time for changes to stabilize and side effects to go away varies-for some people, 1 to 2 years, for others 10 years.   At your appointments, your clinician will review your medications, blood tests, and any side effects of hormones you are having. To help get the best care possible, keep all scheduled lab and clinic appointments and communicate openly with your clinician about your care.    Effects of Masculinizing Hormone Therapy: When They Happen  *if you have a uterus, you can get pregnant while on masculinizing hormones. Masculinizing hormones are not birth control, and they can affect the development of the fetus. If you are trying to get pregnant, you should stop masculinizing hormones and can choose to start therapy again at a later time. The effect of masculinizing hormone therapy on ovaries varies from person to person and is unknown.  **it may be possible to prevent and treat scalp hair loss

## 2024-06-12 ENCOUNTER — LAB (OUTPATIENT)
Dept: LAB | Facility: CLINIC | Age: 34
End: 2024-06-12
Payer: MEDICARE

## 2024-06-12 ENCOUNTER — OFFICE VISIT (OUTPATIENT)
Dept: OBGYN | Facility: CLINIC | Age: 34
End: 2024-06-12
Attending: NURSE PRACTITIONER
Payer: MEDICARE

## 2024-06-12 ENCOUNTER — TELEPHONE (OUTPATIENT)
Dept: FAMILY MEDICINE | Facility: CLINIC | Age: 34
End: 2024-06-12

## 2024-06-12 VITALS
WEIGHT: 224 LBS | SYSTOLIC BLOOD PRESSURE: 117 MMHG | BODY MASS INDEX: 36.15 KG/M2 | HEART RATE: 88 BPM | DIASTOLIC BLOOD PRESSURE: 74 MMHG | OXYGEN SATURATION: 95 %

## 2024-06-12 DIAGNOSIS — F64.9 GENDER INCONGRUENCE: ICD-10-CM

## 2024-06-12 DIAGNOSIS — B00.1 HERPES LABIALIS: ICD-10-CM

## 2024-06-12 DIAGNOSIS — N93.9 ABNORMAL UTERINE BLEEDING: Primary | ICD-10-CM

## 2024-06-12 DIAGNOSIS — Z78.9 FEMALE-TO-MALE TRANSGENDER PERSON: ICD-10-CM

## 2024-06-12 DIAGNOSIS — Z11.3 SCREENING EXAMINATION FOR STI: ICD-10-CM

## 2024-06-12 DIAGNOSIS — Z72.89 OTHER PROBLEMS RELATED TO LIFESTYLE: ICD-10-CM

## 2024-06-12 LAB — T PALLIDUM AB SER QL: NONREACTIVE

## 2024-06-12 PROCEDURE — 99204 OFFICE O/P NEW MOD 45 MIN: CPT | Mod: KX | Performed by: OBSTETRICS & GYNECOLOGY

## 2024-06-12 PROCEDURE — 86780 TREPONEMA PALLIDUM: CPT

## 2024-06-12 PROCEDURE — 36415 COLL VENOUS BLD VENIPUNCTURE: CPT

## 2024-06-12 PROCEDURE — 86696 HERPES SIMPLEX TYPE 2 TEST: CPT

## 2024-06-12 PROCEDURE — 86803 HEPATITIS C AB TEST: CPT

## 2024-06-12 PROCEDURE — 84403 ASSAY OF TOTAL TESTOSTERONE: CPT

## 2024-06-12 PROCEDURE — 86695 HERPES SIMPLEX TYPE 1 TEST: CPT

## 2024-06-12 NOTE — PROGRESS NOTES
Prakash is a 33 year old transgender man here today complaining of abnormal bleeding.  He is on testosterone currently. He doesn't want to go on any estrogen/progesterone mendications    Gyn Hx:      Past Medical History:   Diagnosis Date    ADHD (attention deficit hyperactivity disorder)     Bipolar 1 disorder     Borderline personality disorder     Cauda equina syndrome     Chronic low back pain     Depression     Diabetes mellitus, type 2 (H) 1/19/2023    GERD (gastroesophageal reflux disease)     h/o TBI (traumatic brain injury)     Hypertension, unspecified type 12/16/2021    Marginal corneal ulcer, left 07/17/2015    Nephrolithiasis     obesity     Polysubstance abuse - methamphetamine, hallucinagen, heroin, marijuana     currently in remission    PONV (postoperative nausea and vomiting)     PTSD (post-traumatic stress disorder)      Past Surgical History:   Procedure Laterality Date    BACK SURGERY  12/24/2016    BACK SURGERY - For Cauda Equina Syndrome  12/24/2016    COLONOSCOPY      COMBINED CYSTOSCOPY, INSERT STENT URETER(S) Left 8/30/2018    Procedure: COMBINED CYSTOSCOPY, INSERT STENT URETER(S);  Cystoscopy With Left Stent Placement;  Surgeon: Kiran Ulrich MD;  Location: [facility] OR    COMBINED CYSTOSCOPY, RETROGRADES, EXCHANGE STENT URETER(S) Left 10/14/2018    Procedure: COMBINED CYSTOSCOPY, RETROGRADES, EXCHANGE STENT URETER(S);  Cystoscopy and removal of left-sided stent.;  Surgeon: Stiven Olivo MD;  Location:  OR    COMBINED CYSTOSCOPY, RETROGRADES, URETEROSCOPY, INSERT STENT  1/6/2014    Procedure: COMBINED CYSTOSCOPY, RETROGRADES, URETEROSCOPY, INSERT STENT;  Cystyoscopy place left ureteral stent;  Surgeon: Jaun Kimble MD;  Location: [facility] OR    COMBINED CYSTOSCOPY, RETROGRADES, URETEROSCOPY, INSERT STENT Left 10/23/2018    Procedure: Video cystoscopy, left ureteroscopy with stone extraction;  Surgeon: Bull Mast MD;  Location:  OR    CYSTOSCOPY, URETEROSCOPY,  COMBINED Right 9/23/2015    Procedure: COMBINED CYSTOSCOPY, URETEROSCOPY;  Surgeon: ROME Jett MD;  Location: [facility] OR    ENT SURGERY      ESOPHAGOSCOPY, GASTROSCOPY, DUODENOSCOPY (EGD), COMBINED  4/8/2013    Procedure: COMBINED ESOPHAGOSCOPY, GASTROSCOPY, DUODENOSCOPY (EGD), BIOPSY SINGLE OR MULTIPLE;  Gastroscopy;  Surgeon: Peter Pickard MD;  Location: [facility]    ESOPHAGOSCOPY, GASTROSCOPY, DUODENOSCOPY (EGD), COMBINED Left 10/28/2014    Procedure: COMBINED ESOPHAGOSCOPY, GASTROSCOPY, DUODENOSCOPY (EGD), BIOPSY SINGLE OR MULTIPLE;  Surgeon: Narcisa Ramirez MD;  Location:  OR    ESOPHAGOSCOPY, GASTROSCOPY, DUODENOSCOPY (EGD), COMBINED N/A 12/24/2018    Procedure: COMBINED ESOPHAGOSCOPY, GASTROSCOPY, DUODENOSCOPY (EGD), BIOPSY SINGLE OR MULTIPLE;  Surgeon: Sonu Verduzco MD;  Location: [facility]    INJECT EPIDURAL TRANSFORAMINAL LUMBAR / SACRAL EA ADDITIONAL LEVEL Left 3/18/2021    Procedure: Left L5/S1 transforaminal injection for selective L5 nerve root block;  Surgeon: Eliza Pagan MD;  Location: [facility] OR    LAPAROSCOPIC CHOLECYSTECTOMY  11/20/2014    [facility], Dr. Ramirez    LASER HOLMIUM LITHOTRIPSY URETER(S), INSERT STENT, COMBINED  4/2/2014    Procedure: COMBINED CYSTOSCOPY, URETEROSCOPY, LASER HOLMIUM LITHOTRIPSY URETER(S), INSERT STENT;  Cystoscopy,Left Ureteral Stent Removal,Left Ureteroscopy with Laser Lithotripsy,Left Ureter Stent Placement;  Surgeon: ROME Jett MD;  Location: [facility] OR    Transurethral stone resection  03/11/2011         ALL/Meds: His medication and allergy histories were reviewed and are documented in their appropriate chart areas.    SH: Reviewed and documented in the appropriate area of the chart.  FH:  His family history is reviewed and updated in the chart, today.  PMH: His past medical, surgical, and obstetric histories were reviewed and updated today in the appropriate chart areas.    PE: /74 (BP Location: Right arm, Patient Position: Sitting, Cuff  Size: Adult Large)   Pulse 88   Wt 101.6 kg (224 lb)   LMP  (LMP Unknown)   SpO2 95%   Breastfeeding No   BMI 36.15 kg/m    Body mass index is 36.15 kg/m .    Pertinent Physical exam findings:    General Appearance:  healthy, alert, active, no distress    CT:  LOWER CHEST: Normal.     HEPATOBILIARY: Marked fatty infiltration of the liver. Cholelithiasis.     PANCREAS: Normal.     SPLEEN: Normal.     ADRENAL GLANDS: Normal.     KIDNEYS/BLADDER: There is a 5 x 4 x 4 mm calculus in the proximal left ureter at the UPJ resulting in mild hydronephrosis and perinephric stranding. Additional 2 mm nonobstructing calyceal stone in the left kidney upper pole. 3 nonobstructing calyceal   stones in the right kidney measuring up to 5 mm. No hydronephrosis on the right.     BOWEL: Normal.     LYMPH NODES: Normal.     VASCULATURE: Unremarkable.     PELVIC ORGANS: Normal.     MUSCULOSKELETAL: Degenerative disc disease at the lumbosacral interspace.                                                                      IMPRESSION:   1.  A 5 x 4 x 4 mm calculus at the left UPJ results in mild hydronephrosis.  2.  Additional nonobstructing calyceal stones in both kidneys are unchanged from 10/7/2021.  3.  Marked fatty infiltration of the liver.    Discussed options.  Reviewed the risks, benefits, and alternatives of hysterectomy including but not limited to bleeding, infection, injury to bowel,bladder and other structures.  The patient is aware that hysterectomy will render her sterile and unable to have further children.  We discussed the different routes of surgery including abdominal, vaginal, and laparoscopic and the possibility that the route of surgery may change during the procedure.  We discussed both total and supracervical hysterectomy and the benefits and contraindications involved.  We discussed ovarian sparing as well as oophrectomy. Reviewed pre and post op course. Patient was given the opportunity to ask questions  and have them answered. The patient wants to proceed with surgery.  He does want oophrectomy as well.       A/P:(N93.9) Abnormal uterine bleeding  (primary encounter diagnosis)  Z78.9) Female-to-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 person  Comment: 45 minutes spent on the date of the encounter doing chart review, review of test results, patient visit, and documentation    Plan: TOTAL LAPAROSCOPIC HYSTERECTOMY BILATERAL SALPINGO-OOPHRECTOMY            - No orders of the defined types were placed in this encounter.

## 2024-06-12 NOTE — PROGRESS NOTES
[facility] SURGERY PLANNING/SCHEDULING WORKSHEET                                                     Prakash Prasad                :  1990  MRN:  [id number]  Home Phone [phone]   Work Phone Not on file.   Mobile [phone]         Surgeon: Kevin Pan MD    DIAGNOSIS:   Abnormal uterine bleeding, Transgender female to male    SURGICAL PROCEDURE:  TOTAL LAPAROSCOPIC HYSTERECTOMY BILATERAL SALPINGO-OOPHRECTOMY     Surgery Location:  [facility]  Patient Surgery Class:  SDS  Length of Procedure:  120 minutes  Type of anesthesia:  General    Multi-surgeon case: no  OR Assistant needed:   Yes:   Vendor needed: No  Positioning:  See Preference Card  Laterality:  NA  Date requested:        Special Equipment: None  Special Instructions for patient:  Not needed  Precautions:  NONE  :  NOT NEEDED    Sterilization consent:   Hysterectomy consent signed .    Preop: Pre-op options: PCP  Will need to be off GLP1 meds prior to surgery  Pre-surgery consult needed:  Not applicable.  Postop evaluation needed:  2 weeks    ALLERGIES:   Allergies   Allergen Reactions    Haldol [Haloperidol] Other (See Comments)     Makes patient very angry and anxious    Adhesive Tape Hives    Percocet [Oxycodone-Acetaminophen] Nausea and Vomiting    Prednisone Other (See Comments) and Hives     Suicidal ideation    Risperidone Other (See Comments)    Tramadol Hcl Nausea and Vomiting    Droperidol Anxiety    Seroquel [Quetiapine] Palpitations     Spent 2 weeks in the hospital due to having seroquel, caused palpitations and QT prolongation      BMI:Body mass index is 36.15 kg/m .      The proposed surgical procedure is considered LOW risk.      Kevin Pan MD    2024

## 2024-06-13 ENCOUNTER — MYC MEDICAL ADVICE (OUTPATIENT)
Dept: FAMILY MEDICINE | Facility: CLINIC | Age: 34
End: 2024-06-13
Payer: MEDICARE

## 2024-06-13 LAB
HCV AB SERPL QL IA: NONREACTIVE
HSV1 IGG SERPL QL IA: 22 INDEX
HSV1 IGG SERPL QL IA: ABNORMAL
HSV2 IGG SERPL QL IA: 1.08 INDEX
HSV2 IGG SERPL QL IA: ABNORMAL

## 2024-06-13 NOTE — TELEPHONE ENCOUNTER
PRIOR AUTHORIZATION DENIED    Medication: TESTOSTERONE 50 MG/5GM TD GEL  Insurance Company: [employer] - Phone [phone] Fax [phone]  Denial Date: 6/12/2024  Denial Reason(s):     Appeal Information:     Patient Notified: No

## 2024-06-14 ENCOUNTER — TELEPHONE (OUTPATIENT)
Dept: OBGYN | Facility: CLINIC | Age: 34
End: 2024-06-14

## 2024-06-14 NOTE — TELEPHONE ENCOUNTER
[facility] SURGERY PLANNING/SCHEDULING WORKSHEET                                                     Prakash Prasad                :  1990  MRN:  [id number]  Home Phone [phone]   Work Phone Not on file.   Mobile [phone]         Surgeon: Kevin Pan MD     DIAGNOSIS:   Abnormal uterine bleeding, Transgender female to male     SURGICAL PROCEDURE:  TOTAL LAPAROSCOPIC HYSTERECTOMY BILATERAL SALPINGO-OOPHRECTOMY      Surgery Location:  [facility]  Patient Surgery Class:  SDS  Length of Procedure:  120 minutes  Type of anesthesia:  General     Multi-surgeon case: no  OR Assistant needed:   Yes:   Vendor needed: No  Positioning:  See Preference Card  Laterality:  NA  Date requested:         Special Equipment: None  Special Instructions for patient:  Not needed  Precautions:  NONE  :  NOT NEEDED     Sterilization consent:   Hysterectomy consent signed .     Preop: Pre-op options: PCP  Will need to be off GLP1 meds prior to surgery  Pre-surgery consult needed:  Not applicable.  Postop evaluation needed:  2 weeks      BMI:Body mass index is 36.15 kg/m .       The proposed surgical procedure is considered LOW risk.      Kevin Pan MD    2024  SURGERY SCHEDULING AND PRECERTIFICATION    Medical Record Number: [id number]  Prakash Prasad  YOB: 1990   Phone: [phone] (home)   Primary Provider: Becki Avery    Reason for Admit:  ICD-10 CODE:  N93.9, Z78.9    Surgeon: Kevin Pan MD  Surgical Procedure: TOTAL LAPAROSCOPIC HYSTERECTOMY BILATERAL SALPINGO-OOPHRECTOMY     Date of Surgery  Time of Surgery 12:30pm  Surgery to be performed at:  [facility]  Status: Outpatient  Type of Anesthesia Anticipated: General    Sterilization consent:  Yes and was signed on .    Pre-Op: On  with Becki zuniga [place]  COVID testing:  Per Provider's discretion Covid testing is not indicated.     Post-Op:  2 weeks on  09/04 with Dr Pan at [place]    Pre-certification routed to [employer]:  Yes    Surgery packet mailed to patient's home address: Yes  Patient instructed NPO 12 hours prior to surgery, arrive 1.5 hours  prior to surgery, must have a .  Patient understood and agrees to the plan.      Requestor:  Ruby Nguyễn     Location:  [place] [id number]

## 2024-06-15 ENCOUNTER — MYC MEDICAL ADVICE (OUTPATIENT)
Dept: FAMILY MEDICINE | Facility: CLINIC | Age: 34
End: 2024-06-15
Payer: MEDICARE

## 2024-06-15 ENCOUNTER — MYC MEDICAL ADVICE (OUTPATIENT)
Dept: DERMATOLOGY | Facility: CLINIC | Age: 34
End: 2024-06-15
Payer: MEDICARE

## 2024-06-15 LAB — TESTOST SERPL-MCNC: 240 NG/DL (ref 8–950)

## 2024-06-16 DIAGNOSIS — F41.1 GAD (GENERALIZED ANXIETY DISORDER): ICD-10-CM

## 2024-06-16 RX ORDER — CLONAZEPAM 0.5 MG/1
0.25 TABLET ORAL 2 TIMES DAILY
Qty: 30 TABLET | OUTPATIENT
Start: 2024-06-16

## 2024-06-17 DIAGNOSIS — F64.9 GENDER INCONGRUENCE: ICD-10-CM

## 2024-06-17 RX ORDER — TESTOSTERONE GEL, 1% 10 MG/G
100 GEL TRANSDERMAL DAILY
Qty: 30 PACKET | Refills: 2 | Status: SHIPPED | OUTPATIENT
Start: 2024-06-17 | End: 2024-08-13

## 2024-06-20 ENCOUNTER — VIRTUAL VISIT (OUTPATIENT)
Dept: FAMILY MEDICINE | Facility: CLINIC | Age: 34
End: 2024-06-20
Payer: MEDICARE

## 2024-06-20 DIAGNOSIS — M54.42 CHRONIC BILATERAL LOW BACK PAIN WITH LEFT-SIDED SCIATICA: ICD-10-CM

## 2024-06-20 DIAGNOSIS — G89.29 CHRONIC BILATERAL LOW BACK PAIN WITH LEFT-SIDED SCIATICA: ICD-10-CM

## 2024-06-20 DIAGNOSIS — Z13.1 SCREENING FOR DIABETES MELLITUS: Primary | ICD-10-CM

## 2024-06-20 PROCEDURE — 99214 OFFICE O/P EST MOD 30 MIN: CPT | Mod: 95 | Performed by: PHYSICIAN ASSISTANT

## 2024-06-20 PROCEDURE — G2211 COMPLEX E/M VISIT ADD ON: HCPCS | Mod: 95 | Performed by: PHYSICIAN ASSISTANT

## 2024-06-20 RX ORDER — BACLOFEN 10 MG/1
10 TABLET ORAL 2 TIMES DAILY
Qty: 60 TABLET | Refills: 0 | Status: SHIPPED | OUTPATIENT
Start: 2024-06-20 | End: 2024-08-07

## 2024-06-20 RX ORDER — GABAPENTIN 600 MG/1
TABLET ORAL
Qty: 150 TABLET | Refills: 0 | Status: SHIPPED | OUTPATIENT
Start: 2024-06-20 | End: 2024-07-12

## 2024-06-20 ASSESSMENT — ANXIETY QUESTIONNAIRES
7. FEELING AFRAID AS IF SOMETHING AWFUL MIGHT HAPPEN: NOT AT ALL
2. NOT BEING ABLE TO STOP OR CONTROL WORRYING: MORE THAN HALF THE DAYS
GAD7 TOTAL SCORE: 8
1. FEELING NERVOUS, ANXIOUS, OR ON EDGE: MORE THAN HALF THE DAYS
4. TROUBLE RELAXING: SEVERAL DAYS
8. IF YOU CHECKED OFF ANY PROBLEMS, HOW DIFFICULT HAVE THESE MADE IT FOR YOU TO DO YOUR WORK, TAKE CARE OF THINGS AT HOME, OR GET ALONG WITH OTHER PEOPLE?: VERY DIFFICULT
6. BECOMING EASILY ANNOYED OR IRRITABLE: SEVERAL DAYS
5. BEING SO RESTLESS THAT IT IS HARD TO SIT STILL: SEVERAL DAYS
7. FEELING AFRAID AS IF SOMETHING AWFUL MIGHT HAPPEN: NOT AT ALL
IF YOU CHECKED OFF ANY PROBLEMS ON THIS QUESTIONNAIRE, HOW DIFFICULT HAVE THESE PROBLEMS MADE IT FOR YOU TO DO YOUR WORK, TAKE CARE OF THINGS AT HOME, OR GET ALONG WITH OTHER PEOPLE: VERY DIFFICULT
3. WORRYING TOO MUCH ABOUT DIFFERENT THINGS: SEVERAL DAYS

## 2024-06-20 ASSESSMENT — PAIN SCALES - PAIN ENJOYMENT GENERAL ACTIVITY SCALE (PEG)
PEG_TOTALSCORE: 9
AVG_PAIN_PASTWEEK: 7
INTERFERED_GENERAL_ACTIVITY: 10
INTERFERED_ENJOYMENT_LIFE: 10
INTERFERED_GENERAL_ACTIVITY: 10 - COMPLETELY INTERFERES
AVG_PAIN_PASTWEEK: 7
PEG_TOTALSCORE: 9
INTERFERED_ENJOYMENT_LIFE: 10 - COMPLETELY INTERFERES

## 2024-06-20 ASSESSMENT — PATIENT HEALTH QUESTIONNAIRE - PHQ9
SUM OF ALL RESPONSES TO PHQ QUESTIONS 1-9: 3
10. IF YOU CHECKED OFF ANY PROBLEMS, HOW DIFFICULT HAVE THESE PROBLEMS MADE IT FOR YOU TO DO YOUR WORK, TAKE CARE OF THINGS AT HOME, OR GET ALONG WITH OTHER PEOPLE: SOMEWHAT DIFFICULT
SUM OF ALL RESPONSES TO PHQ QUESTIONS 1-9: 3

## 2024-06-20 ASSESSMENT — ENCOUNTER SYMPTOMS: BACK PAIN: 1

## 2024-06-20 NOTE — PROGRESS NOTES
Prakash is a 33 year old who is being evaluated via a billable video visit.    How would you like to obtain your AVS? MyChart  If the video visit is dropped, the invitation should be resent by: Text to cell phone: [phone]  Will anyone else be joining your video visit? No      Assessment & Plan         Chronic bilateral low back pain with left-sided sciatica  Ok to increase gabapentin but not to more than recommended daily dosing which is 2.4 g.   Encouraged him to speak to PCP about referral to spine specialist or possibly a pain specialist for more than medical THC for guidance of ongoing pain management since pain is still significant  Continue with psych, also given crisis info if needed on handout  - baclofen (LIORESAL) 10 MG tablet; Take 1 tablet (10 mg) by mouth 2 times daily Do not stop suddenly. For muscle relaxation  - gabapentin (NEURONTIN) 600 MG tablet; Take two tablets in the am, 1 tab in the afternoon, and 2 tab in the pm total 5 tablets daily.    To er with severe worsening symptoms      Depression Screening Follow Up            Follow Up Actions Taken  Crisis resource information provided in the After Visit Summary  Patient Instructions   FREE AND CONFIDENTIAL 24/7 MENTAL HEALTH OR CRISIS HOTLINES:  BY COUNTY you live in:      Get Help in a Crisis in [facility]:  Call 911 if you or others are in immediate physical danger and need help from the police, fire department, or an ambulance.   Call 988 if you or someone close to you is having a mental health crisis. The [facility] Mobile Crisis Response team will help you. The line is open all day, every day, and the call is free. The Crisis Response line is for both adults and children.  Text [place] to 898386 to be connected with a counselor who will help defuse the crisis and connect the texter to local resources. Crisis Text Line is available 24 hours a day, seven days a week.   If you want more information about the services you see here, call:  -  Adult Mental Health at [phone] or  - Children's Mental Health at [phone].      DONNE[place] /[place] Adult [phone]                                             Child [phone]    [facility]             Adult [phone]                                            Child [phone]         Davin Randall is a 33 year old, presenting for the following health issues:  Back Pain      6/20/2024     1:16 PM   Additional Questions   Roomed by Meghann   Accompanied by Self     Video Start Time: 1:30 pm    Back Pain        {Provider Documentation  Link to C-SSRS         Objective     Answers submitted by the patient for this visit:  Patient Health Questionnaire (Submitted on 6/20/2024)  If you checked off any problems, how difficult have these problems made it for you to do your work, take care of things at home, or get along with other people?: Somewhat difficult  PHQ9 TOTAL SCORE: 3  AVA-7 (Submitted on 6/20/2024)  AVA 7 TOTAL SCORE: 8  Back Pain Visit Questionnaire (Submitted on 6/20/2024)  Your back pain is: chronic  Chronic or Recurring Back Pain Visit Questionnaire (Submitted on 6/20/2024)  Where is your back pain located? : left lower back, left buttock, right hip, left hip  How would you describe your back pain? : sharp, shooting  Where does your back pain spread? : left thigh, left knee, left foot  Since you noticed your back pain, how has it changed? : gradually worsening  Does your back pain interfere with your job?: Not applicable        Brand new patient to me presents virtually for back pain. Complex with history of schizoaffective, BPD, ahdd, diabetes, and history of opioid use disorder, severe marked in 2013 on chart. History of morbid obesity. Histoyr of intractable back pain and lumbosacral radulopathy at l5.     On gabapentin 600 mg tid for the past 1.5 weeks but not helping the pain. Does not have a spine specialist, used to see a surgeon. Sees pain management but mostly  for medical THC. Is using medical THC as well. Has not seen a specialist in a while for spine per patient has had surgeon before. Has MRI scheduled in July.     Seen in er for a fib alcohol intoxication and ? Gabap[place].     [place] clon[place] 30 [place] 6-15, gabpentin, stimulants filled in last year. No narcotics filled.    Previously baclofen helped, wondering if could try again.     Phq9-elevated, no active plan,   Has psychiatrist, complex psych history.     No loss of b/b control or red flags for back pain.     Vitals:  No vitals were obtained today due to virtual visit.    Physical Exam   GENERAL: alert and no distress  EYES: Eyes grossly normal to inspection.  No discharge or erythema, or obvious scleral/conjunctival abnormalities.  RESP: No audible wheeze, cough, or visible cyanosis.    SKIN: Visible skin clear. No significant rash, abnormal pigmentation or lesions.  NEURO: Cranial nerves grossly intact.  Mentation and speech appropriate for age.  PSYCH: Appropriate affect, tone, and pace of words          Video-Visit Details    Type of service:  Video Visit   Video End Time:1:43 PM  Originating Location (pt. Location): Home    Distant Location (provider location):  On-site  Platform used for Video Visit: Evens  Signed Electronically by: Ashlyn Castorena PA-C

## 2024-06-20 NOTE — PATIENT INSTRUCTIONS
FREE AND CONFIDENTIAL 24/7 MENTAL HEALTH OR CRISIS HOTLINES:  BY COUNTY you live in:      Get Help in a Crisis in [facility]:  Call 911 if you or others are in immediate physical danger and need help from the police, fire department, or an ambulance.   Call 988 if you or someone close to you is having a mental health crisis. The [facility] Mobile Crisis Response team will help you. The line is open all day, every day, and the call is free. The Crisis Response line is for both adults and children.  Text [place] to 573070 to be connected with a counselor who will help defuse the crisis and connect the texter to local resources. Crisis Text Line is available 24 hours a day, seven days a week.   If you want more information about the services you see here, call:  - Adult Mental Health at [phone] or  - Children's Mental Health at [phone].      DONNELL /[place] Adult [phone]                                             Child [phone]    [facility]             Adult [phone]                                            Child [phone]

## 2024-06-26 DIAGNOSIS — F39 MOOD DISORDER (H): ICD-10-CM

## 2024-06-26 RX ORDER — QUETIAPINE FUMARATE 200 MG/1
TABLET, FILM COATED ORAL
Qty: 30 TABLET | Refills: 10 | OUTPATIENT
Start: 2024-06-26

## 2024-06-29 ENCOUNTER — HEALTH MAINTENANCE LETTER (OUTPATIENT)
Age: 34
End: 2024-06-29

## 2024-07-03 NOTE — TELEPHONE ENCOUNTER
"    Central PA -no PA required  -sterilization policy( any hysterectomy in the absence of disease is not covered)     PB DOS: 8/20/24 OP  Type of Procedure: TOTAL LAPAROSCOPIC HYSTERECTOMY BILATERAL SALPINGO-OOPHRECTOMY  CPT Codes: 79662  ICD10 Codes: N93.9, Z78.9  Surgeon/Ordering provider: Kevin Pan MD  Pre-cert/Authorization completed: no PA required  Medicare Sterilization policy:  Under the Medicare Program guidelines the coverage of sterilization is limited to necessary treatment of an illness or injury. An example of necessary treatment is the removal of a uterus or removal of diseased ovaries (bilateral oophorectomy) because of a tumor, or bilateral orchiectomy in the case of prostate cancer.    Elective hysterectomy, tubal ligation and vasectomy in the absence of a disease for which sterilization is considered an effective treatment is not covered. In addition, no payment would be made for sterilization procedures if it is a preventive measure e.g., a physician believes pregnancy would cause overall endangerment to a woman's health, or as a measure to prevent the possible development of, or effect on a mental condition, should pregnancy occur. (Section 1862(a)(1)(A) of the Social Security Act and 42 .15(k).    Claims will be denied on postpay review and payment recouped if the pathological evidence of the necessity to perform any of these procedures to treat an illness or injury is absent and/or when the primary objective is to achieve sterilization.    No hyste[facility] policy:                  Payer: Medicare and [facility]        Please advise the patient to contact their insurance for coverage and benefits. Prior authorization or verified \"no prior authorization required\" is not a guarantee of payment or coverage. Payment is based on the patient's benefit plan documents and the adherance to said documents at the time of service.      HERIBERTO Portillo  Authorization Specialist " II  [facility]   P: [phone]  F: [phone]    For an estimate:   patients can contact the Cost of Care department at:  Phone: [phone] or online: [email or web address].     For [facility] billing questions:   patients should contact the number listed on their bill or contact [phone].

## 2024-07-08 DIAGNOSIS — G24.01 TARDIVE DYSKINESIA: ICD-10-CM

## 2024-07-08 RX ORDER — DEUTETRABENAZINE 6 MG/1
6 TABLET, COATED ORAL DAILY
Qty: 30 TABLET | Refills: 1 | OUTPATIENT
Start: 2024-07-08

## 2024-07-10 ENCOUNTER — TELEPHONE (OUTPATIENT)
Dept: PLASTIC SURGERY | Facility: CLINIC | Age: 34
End: 2024-07-10
Payer: MEDICARE

## 2024-07-10 NOTE — TELEPHONE ENCOUNTER
Called Prakash re: appointment 7/16 with Dr Mayers. Informed Prakash that Dr Mayers wants Prakash to have a LOS in hand prior to another consult with her at this point. He reports he has had one in the past but he doesn't have a current letter at this time. He reports he has a therapist but she has not written him a letter of support yet and is doing some training in that regard. Pt agreed to moving appointment back so that he can come to the next appointment ready for the next steps for planning surgery. RN moved appointment to 9/10 at 9 am per patient's approval.  Beena Sinclair RN on 7/10/2024 at 2:21 PM

## 2024-07-12 ENCOUNTER — MYC REFILL (OUTPATIENT)
Dept: FAMILY MEDICINE | Facility: CLINIC | Age: 34
End: 2024-07-12
Payer: MEDICARE

## 2024-07-12 DIAGNOSIS — G89.29 CHRONIC BILATERAL LOW BACK PAIN WITH LEFT-SIDED SCIATICA: ICD-10-CM

## 2024-07-12 DIAGNOSIS — M54.42 CHRONIC BILATERAL LOW BACK PAIN WITH LEFT-SIDED SCIATICA: ICD-10-CM

## 2024-07-15 ENCOUNTER — TELEPHONE (OUTPATIENT)
Dept: FAMILY MEDICINE | Facility: CLINIC | Age: 34
End: 2024-07-15
Payer: MEDICARE

## 2024-07-15 DIAGNOSIS — Z79.4 TYPE 2 DIABETES MELLITUS WITH HYPERGLYCEMIA, WITH LONG-TERM CURRENT USE OF INSULIN (H): Primary | ICD-10-CM

## 2024-07-15 DIAGNOSIS — E11.65 TYPE 2 DIABETES MELLITUS WITH HYPERGLYCEMIA, WITH LONG-TERM CURRENT USE OF INSULIN (H): Primary | ICD-10-CM

## 2024-07-15 RX ORDER — GABAPENTIN 600 MG/1
TABLET ORAL
Qty: 150 TABLET | Refills: 0 | Status: SHIPPED | OUTPATIENT
Start: 2024-07-15 | End: 2024-08-05

## 2024-07-15 NOTE — TELEPHONE ENCOUNTER
I'm confused.  Is the surgery approved or not.  It isn't being done for sterilization but due to abnormal uterine bleeding.

## 2024-07-15 NOTE — TELEPHONE ENCOUNTER
Patient had a virtual visit 6/20/24 for back pain.    Lab Results   Component Value Date    A1C 7.8 03/13/2024    A1C 7.8 09/26/2023    A1C 8.8 08/10/2023    A1C 9.1 05/05/2023    A1C 6.5 12/15/2022    A1C 5.8 03/19/2021    A1C 5.8 11/05/2018    A1C 5.8 10/03/2017     Per  list, is due for A1C.    Routed to PCP to place future order for A1C or advise if needs visit.    Edith HERNANDEZ RN  [facility] Triage

## 2024-07-15 NOTE — TELEPHONE ENCOUNTER
Reason for Call:  Other     Detailed comments: Patient said that she had seen in my chart she was due for an A1C. Can orders be placed so she can schedule.    Phone Number Patient can be reached at: Home number on file [phone] (home)    Best Time:     Can we leave a detailed message on this number? YES    Call taken on 7/15/2024 at 1:52 PM by Benita Shipman

## 2024-07-15 NOTE — TELEPHONE ENCOUNTER
Adolfo pelaez from [facility]      Needing fax of medication list sent over to [facility] for records    Fax [phone]      Callie RN    Triage Nurse  [facility]

## 2024-07-16 NOTE — TELEPHONE ENCOUNTER
Lab orders placed.  Can be drawn in 2 weeks.  Will plan to check preop labs at the same time.  Please make a lab only appointment for a date and time in about 2 weeks that works best for him    Becki JENKINS

## 2024-07-16 NOTE — TELEPHONE ENCOUNTER
Reviewed documentation - denial states that medication needs to be resubmitted to primary insurer which is Medicare. I am not certain an appeal to the same secondary insurer ([facility]) would be helpful.     Will resend back to PA pool for clarification.     Anabel Dominique, DO

## 2024-07-17 ENCOUNTER — MYC REFILL (OUTPATIENT)
Dept: FAMILY MEDICINE | Facility: CLINIC | Age: 34
End: 2024-07-17
Payer: MEDICARE

## 2024-07-17 DIAGNOSIS — Z79.4 TYPE 2 DIABETES MELLITUS WITH HYPERGLYCEMIA, WITH LONG-TERM CURRENT USE OF INSULIN (H): Primary | ICD-10-CM

## 2024-07-17 DIAGNOSIS — E11.65 TYPE 2 DIABETES MELLITUS WITH HYPERGLYCEMIA, WITH LONG-TERM CURRENT USE OF INSULIN (H): ICD-10-CM

## 2024-07-17 DIAGNOSIS — E11.65 TYPE 2 DIABETES MELLITUS WITH HYPERGLYCEMIA, WITH LONG-TERM CURRENT USE OF INSULIN (H): Primary | ICD-10-CM

## 2024-07-17 DIAGNOSIS — Z79.4 TYPE 2 DIABETES MELLITUS WITH HYPERGLYCEMIA, WITH LONG-TERM CURRENT USE OF INSULIN (H): ICD-10-CM

## 2024-07-17 NOTE — TELEPHONE ENCOUNTER
Patient is calling regarding taking Turmeric. He stated that he has been taking it OTC, for inflammation and joint pain.    She is wondering if PCP, can please send a script to the pharmacy.    Routed to please advise.    Tran CHOI RN  Triage Nurse  [facility]

## 2024-07-18 NOTE — TELEPHONE ENCOUNTER
Called [phone] (home).   Did they answer the phone: No, left a message on voicemail to return call to the [facility] at [phone], and to ask for any available triage nurse.  (RN did not leave specific details on voicemail for confidential reasons)    Tran CHOI RN  Triage Nurse  [facility]

## 2024-07-18 NOTE — TELEPHONE ENCOUNTER
Patient takes Tumeric 500 mg daily.    Pended    Patient also asking if his Iron could be check.    Patient has restless legs and was told iron could be related.    Also pended,    Patient has pre-op 8/7    Caden Rucker RN, BSN, PHN  [facility]

## 2024-07-19 RX ORDER — VIT C/B6/B5/MAGNESIUM/HERB 173 50-5-6-5MG
1 CAPSULE ORAL DAILY
Qty: 30 CAPSULE | Refills: 11 | Status: SHIPPED | OUTPATIENT
Start: 2024-07-19 | End: 2024-09-03

## 2024-07-25 RX ORDER — PROPRANOLOL HYDROCHLORIDE 10 MG/1
TABLET ORAL
Qty: 90 TABLET | Refills: 10 | OUTPATIENT
Start: 2024-07-25

## 2024-07-25 RX ORDER — ATOMOXETINE 40 MG/1
CAPSULE ORAL
Qty: 60 CAPSULE | Refills: 10 | OUTPATIENT
Start: 2024-07-25

## 2024-07-28 RX ORDER — GABAPENTIN 600 MG/1
TABLET ORAL
Qty: 180 TABLET | Refills: 10 | OUTPATIENT
Start: 2024-07-28

## 2024-08-04 DIAGNOSIS — M54.42 CHRONIC BILATERAL LOW BACK PAIN WITH LEFT-SIDED SCIATICA: ICD-10-CM

## 2024-08-04 DIAGNOSIS — G89.29 CHRONIC BILATERAL LOW BACK PAIN WITH LEFT-SIDED SCIATICA: ICD-10-CM

## 2024-08-05 RX ORDER — GABAPENTIN 600 MG/1
TABLET ORAL
Qty: 150 TABLET | Refills: 0 | Status: SHIPPED | OUTPATIENT
Start: 2024-08-05 | End: 2024-09-09

## 2024-08-06 DIAGNOSIS — Z79.4 TYPE 2 DIABETES MELLITUS WITH HYPERGLYCEMIA, WITH LONG-TERM CURRENT USE OF INSULIN (H): Primary | ICD-10-CM

## 2024-08-06 DIAGNOSIS — E11.65 TYPE 2 DIABETES MELLITUS WITH HYPERGLYCEMIA, WITH LONG-TERM CURRENT USE OF INSULIN (H): Primary | ICD-10-CM

## 2024-08-07 ENCOUNTER — OFFICE VISIT (OUTPATIENT)
Dept: FAMILY MEDICINE | Facility: CLINIC | Age: 34
End: 2024-08-07
Payer: MEDICARE

## 2024-08-07 ENCOUNTER — MYC MEDICAL ADVICE (OUTPATIENT)
Dept: FAMILY MEDICINE | Facility: CLINIC | Age: 34
End: 2024-08-07

## 2024-08-07 VITALS
SYSTOLIC BLOOD PRESSURE: 124 MMHG | BODY MASS INDEX: 36.49 KG/M2 | RESPIRATION RATE: 18 BRPM | TEMPERATURE: 97.8 F | HEART RATE: 96 BPM | WEIGHT: 226.1 LBS | DIASTOLIC BLOOD PRESSURE: 84 MMHG | OXYGEN SATURATION: 96 %

## 2024-08-07 DIAGNOSIS — Z01.818 PREOP GENERAL PHYSICAL EXAM: Primary | ICD-10-CM

## 2024-08-07 DIAGNOSIS — Z78.9 FEMALE-TO-MALE TRANSGENDER PERSON: ICD-10-CM

## 2024-08-07 DIAGNOSIS — Z79.4 TYPE 2 DIABETES MELLITUS WITH HYPERGLYCEMIA, WITH LONG-TERM CURRENT USE OF INSULIN (H): ICD-10-CM

## 2024-08-07 DIAGNOSIS — N93.9 ABNORMAL UTERINE BLEEDING: ICD-10-CM

## 2024-08-07 DIAGNOSIS — E11.65 TYPE 2 DIABETES MELLITUS WITH HYPERGLYCEMIA, WITH LONG-TERM CURRENT USE OF INSULIN (H): Primary | ICD-10-CM

## 2024-08-07 DIAGNOSIS — E11.65 TYPE 2 DIABETES MELLITUS WITH HYPERGLYCEMIA, WITH LONG-TERM CURRENT USE OF INSULIN (H): ICD-10-CM

## 2024-08-07 DIAGNOSIS — Z79.4 TYPE 2 DIABETES MELLITUS WITH HYPERGLYCEMIA, WITH LONG-TERM CURRENT USE OF INSULIN (H): Primary | ICD-10-CM

## 2024-08-07 LAB
BASOPHILS # BLD AUTO: 0 10E3/UL (ref 0–0.2)
BASOPHILS NFR BLD AUTO: 0 %
EOSINOPHIL # BLD AUTO: 0.1 10E3/UL (ref 0–0.7)
EOSINOPHIL NFR BLD AUTO: 1 %
ERYTHROCYTE [DISTWIDTH] IN BLOOD BY AUTOMATED COUNT: 14.1 % (ref 10–15)
HBA1C MFR BLD: 6.7 % (ref 0–5.6)
HCT VFR BLD AUTO: 47.4 % (ref 35–53)
HGB BLD-MCNC: 15.8 G/DL (ref 11.7–17.7)
IMM GRANULOCYTES # BLD: 0 10E3/UL
IMM GRANULOCYTES NFR BLD: 0 %
LYMPHOCYTES # BLD AUTO: 2.4 10E3/UL (ref 0.8–5.3)
LYMPHOCYTES NFR BLD AUTO: 22 %
MCH RBC QN AUTO: 28.5 PG (ref 26.5–33)
MCHC RBC AUTO-ENTMCNC: 33.3 G/DL (ref 31.5–36.5)
MCV RBC AUTO: 85 FL (ref 78–100)
MONOCYTES # BLD AUTO: 0.5 10E3/UL (ref 0–1.3)
MONOCYTES NFR BLD AUTO: 5 %
NEUTROPHILS # BLD AUTO: 8.2 10E3/UL (ref 1.6–8.3)
NEUTROPHILS NFR BLD AUTO: 73 %
PLATELET # BLD AUTO: 291 10E3/UL (ref 150–450)
RBC # BLD AUTO: 5.55 10E6/UL (ref 3.8–5.9)
WBC # BLD AUTO: 11.3 10E3/UL (ref 4–11)

## 2024-08-07 PROCEDURE — 36415 COLL VENOUS BLD VENIPUNCTURE: CPT | Performed by: NURSE PRACTITIONER

## 2024-08-07 PROCEDURE — 83036 HEMOGLOBIN GLYCOSYLATED A1C: CPT | Performed by: NURSE PRACTITIONER

## 2024-08-07 PROCEDURE — 85025 COMPLETE CBC W/AUTO DIFF WBC: CPT | Performed by: NURSE PRACTITIONER

## 2024-08-07 PROCEDURE — 83550 IRON BINDING TEST: CPT | Performed by: NURSE PRACTITIONER

## 2024-08-07 PROCEDURE — 82728 ASSAY OF FERRITIN: CPT | Performed by: NURSE PRACTITIONER

## 2024-08-07 PROCEDURE — 84443 ASSAY THYROID STIM HORMONE: CPT | Performed by: NURSE PRACTITIONER

## 2024-08-07 PROCEDURE — 99214 OFFICE O/P EST MOD 30 MIN: CPT | Mod: KX | Performed by: NURSE PRACTITIONER

## 2024-08-07 PROCEDURE — 80053 COMPREHEN METABOLIC PANEL: CPT | Performed by: NURSE PRACTITIONER

## 2024-08-07 PROCEDURE — 83540 ASSAY OF IRON: CPT | Performed by: NURSE PRACTITIONER

## 2024-08-07 PROCEDURE — 99459 PELVIC EXAMINATION: CPT | Mod: KX | Performed by: NURSE PRACTITIONER

## 2024-08-07 PROCEDURE — 80061 LIPID PANEL: CPT | Performed by: NURSE PRACTITIONER

## 2024-08-07 RX ORDER — LISDEXAMFETAMINE DIMESYLATE 30 MG/1
30 CAPSULE ORAL EVERY MORNING
COMMUNITY
Start: 2024-08-07 | End: 2024-09-03

## 2024-08-07 RX ORDER — OXYCODONE HYDROCHLORIDE 5 MG/1
5 TABLET ORAL 2 TIMES DAILY PRN
COMMUNITY
Start: 2024-08-07 | End: 2024-08-27

## 2024-08-07 NOTE — PATIENT INSTRUCTIONS
Take 20 units of insulin the night prior to your surgery    Hold your jardiance for 3 days prior to surgery    No ozempic for 1 week prior to your surgery, so do not take any more until after your surgery is completed.     Please go to the pharmacy for Hibiclens and wash with that the night prior and the morning of your surgery.     You can take all of your other medications as usual the AM of your surgery with a small sip of water.          Patient Education   Preparing for Your Surgery  Getting started  A nurse will call you to review your health history and instructions. They will give you an arrival time based on your scheduled surgery time. Please be ready to share:  Your doctor's clinic name and phone number  Your medical, surgical, and anesthesia history  A list of allergies and sensitivities  A list of medicines, including herbal treatments and over-the-counter drugs  Whether the patient has a legal guardian (ask how to send us the papers in advance)  Please tell us if you're pregnant--or if there's any chance you might be pregnant. Some surgeries may injure a fetus (unborn baby), so they require a pregnancy test. Surgeries that are safe for a fetus don't always need a test, and you can choose whether to have one.   If you have a child who's having surgery, please ask for a copy of Preparing for Your Child's Surgery.    Preparing for surgery  Within 10 to 30 days of surgery: Have a pre-op exam (sometimes called an H&P, or History and Physical). This can be done at a clinic or pre-operative center.  If you're having a , you may not need this exam. Talk to your care team.  At your pre-op exam, talk to your care team about all medicines you take. If you need to stop any medicines before surgery, ask when to start taking them again.  We do this for your safety. Many medicines can make you bleed too much during surgery. Some change how well surgery (anesthesia) drugs work.  Call your insurance company to  let them know you're having surgery. (If you don't have insurance, call [phone].)  Call your clinic if there's any change in your health. This includes signs of a cold or flu (sore throat, runny nose, cough, rash, fever). It also includes a scrape or scratch near the surgery site.  If you have questions on the day of surgery, call your hospital or surgery center.  Eating and drinking guidelines  For your safety: Unless your surgeon tells you otherwise, follow the guidelines below.  Eat and drink as usual until 8 hours before you arrive for surgery. After that, no food or milk.  Drink clear liquids until 2 hours before you arrive. These are liquids you can see through, like water, Gatorade, and Propel Water. They also include plain black coffee and tea (no cream or milk), candy, and breath mints. You can spit out gum when you arrive.  If you drink alcohol: Stop drinking it the night before surgery.  If your care team tells you to take medicine on the morning of surgery, it's okay to take it with a sip of water.  Preventing infection  Shower or bathe the night before and morning of your surgery. Follow the instructions your clinic gave you. (If no instructions, use regular soap.)  Don't shave or clip hair near your surgery site. We'll remove the hair if needed.  Don't smoke or vape the morning of surgery. You may chew nicotine gum up to 2 hours before surgery. A nicotine patch is okay.  Note: Some surgeries require you to completely quit smoking and nicotine. Check with your surgeon.  Your care team will make every effort to keep you safe from infection. We will:  Clean our hands often with soap and water (or an alcohol-based hand rub).  Clean the skin at your surgery site with a special soap that kills germs.  Give you a special gown to keep you warm. (Cold raises the risk of infection.)  Wear special hair covers, masks, gowns and gloves during surgery.  Give antibiotic medicine, if prescribed. Not all surgeries  need antibiotics.  What to bring on the day of surgery  Photo ID and insurance card  Copy of your health care directive, if you have one  Glasses and hearing aids (bring cases)  You can't wear contacts during surgery  Inhaler and eye drops, if you use them (tell us about these when you arrive)  CPAP machine or breathing device, if you use them  A few personal items, if spending the night  If you have . . .  A pacemaker, ICD (cardiac defibrillator) or other implant: Bring the ID card.  An implanted stimulator: Bring the remote control.  A legal guardian: Bring a copy of the certified (court-stamped) guardianship papers.  Please remove any jewelry, including body piercings. Leave jewelry and other valuables at home.  If you're going home the day of surgery  You must have a responsible adult drive you home. They should stay with you overnight as well.  If you don't have someone to stay with you, and you aren't safe to go home alone, we may keep you overnight. Insurance often won't pay for this.  After surgery  If it's hard to control your pain or you need more pain medicine, please call your surgeon's office.  Questions?   If you have any questions for your care team, list them here: _________________________________________________________________________________________________________________________________________________________________________ ____________________________________ ____________________________________ ____________________________________  For informational purposes only. Not to replace the advice of your health care provider. Copyright   2003, 2019 [facility]. All rights reserved. Clinically reviewed by Anne Blood MD. [employer] [id number] - REV 12/22.

## 2024-08-07 NOTE — PROGRESS NOTES
Preoperative Evaluation  [facility] [place]  [address]  [place] [address]  Phone: [phone]  Primary Provider: BROOKLYN Cruz CNP  Pre-op Performing Provider: BROOKLYN Cruz CNP  Aug 7, 2024           8/7/2024   Surgical Information   What procedure is being done? hysterectomy   Facility or Hospital where procedure/surgery will be performed: [facility]   Who is doing the procedure / surgery? branden   Date of surgery / procedure: aug 20   Time of surgery / procedure: branden   Where do you plan to recover after surgery? at home alone        Fax number for surgical facility: Note does not need to be faxed, will be available electronically in Epic.    Assessment & Plan     The proposed surgical procedure is considered INTERMEDIATE risk.    Type 2 diabetes mellitus with hyperglycemia, with long-term current use of insulin (H)  Hemoglobin A1c well-controlled at 6.7.  Decreased dose of Lantus to 20 units the evening prior to surgery.  Discontinue semaglutide at least 1 week prior to surgery.  Stop Jardiance 3 days prior to surgery.  Follow-up in 6 months  - Ferritin  - Iron and iron binding capacity  - TSH  - Lipid panel reflex to direct LDL Fasting  - Hemoglobin A1c  - Comprehensive metabolic panel  - CBC with Platelets & Differential    Preop general physical exam  Fully optimized for procedure.  Continue to follow closely with gynecology  - Chlamydia trachomatis/Neisseria gonorrhoeae by PCR; Future    Female-to-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 person  Abnormal uterine bleeding  Previous gynecology note reviewed.  Fully optimized for total abdominal hysterectomy.  Continue to follow closely with gynecology.     Declines HCG-reports is not sexually active. Informed that will need to have urine HCG done AM of surgery-is agreeable.      - No identified additional risk factors other than previously addressed    Antiplatelet or Anticoagulation Medication Instructions   - Patient is on no  antiplatelet or anticoagulation medications.    Additional Medication Instructions   - Long acting insulin (e.g. glargine, detemir): Take 80% of the usual evening or morning dose before surgery.     - SGLT2 Inhibitor (canagliflozin, dapagliflozin, or empagliflozin): DO NOT TAKE 3 days before surgery.    - GLP-1 Injectable (exenitide, liraglutide, semaglutide, dulaglutide, etc.): DO NOT TAKE 7 days before surgery     Recommendation  Approval given to proceed with proposed procedure, without further diagnostic evaluation.    Davin Randall is a 34 year old, presenting for the following:  No chief complaint on file.        HPI related to upcoming procedure: Is going to be having hyst with both ovaries removed.         8/7/2024   Pre-Op Questionnaire   Have you ever had a heart attack or stroke? No   Have you ever had surgery on your heart or blood vessels, such as a stent placement, a coronary artery bypass, or surgery on an artery in your head, neck, heart, or legs? No   Do you have chest pain with activity? No   Do you have a history of heart failure? No   Do you currently have a cold, bronchitis or symptoms of other infection? No   Do you have a cough, shortness of breath, or wheezing? No   Do you or anyone in your family have previous history of blood clots? No   Do you or does anyone in your family have a serious bleeding problem such as prolonged bleeding following surgeries or cuts? No   Have you ever had problems with anemia or been told to take iron pills? No   Have you had any abnormal blood loss such as black, tarry or bloody stools? No   Have you had any abnormal blood loss such as black, tarry or bloody stools, or abnormal vaginal bleeding? No   Have you ever had a blood transfusion? No   Are you willing to have a blood transfusion if it is medically needed before, during, or after your surgery? Yes   Have you or any of your relatives ever had problems with anesthesia? No   Do you have sleep apnea,  excessive snoring or daytime drowsiness? No   Do you have any artifical heart valves or other implanted medical devices like a pacemaker, defibrillator, or continuous glucose monitor? No   Do you have artificial joints? No   Are you allergic to latex? No        Health Care Directive  Patient does not have a Health Care Directive or Living Will: Discussed advance care planning with patient; however, patient declined at this time.    Preoperative Review of    reviewed - controlled substances prescribed by other outside provider(s).      Patient Active Problem List    Diagnosis Date Noted    Type 2 diabetes mellitus with hyperglycemia, with long-term current use of insulin (H) 05/15/2024     Priority: Medium    Herpes labialis 05/15/2024     Priority: Medium    Acne vulgaris 11/27/2023     Priority: Medium    Pain of right hand 11/08/2023     Priority: Medium    Mood disorder due to a general medical condition 08/15/2023     Priority: Medium    Morbid obesity (H) 08/29/2022     Priority: Medium    Female-to-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 person 03/21/2022     Priority: Medium    Hypertension, unspecified type 12/16/2021     Priority: Medium    Akathisia 12/09/2021     Priority: Medium    Schizoaffective disorder, chronic condition with acute exacerbation (H) 11/29/2021     Priority: Medium    Schizophrenia, schizoaffective, chronic with acute exacerbation (H) 11/29/2021     Priority: Medium    Bipolar affective disorder, mixed, severe, with psychotic behavior (H) 11/27/2021     Priority: Medium    Acanthosis nigricans 07/21/2021     Priority: Medium    Abnormal uterine bleeding 03/01/2021     Priority: Medium     3/1/2021  Plan Documentation  Service ordered Depo Provera injection (150mg IM) may be given every 3 months for one year per protoccol.  Education by RN to be done in clinic.  topic injection teaching.   Plan and order should be renewed at a visit no later than March 1, 2022    Glenda Juan MD        Lumbosacral radiculopathy at L5 02/23/2021     Priority: Medium     Added automatically from request for surgery [id number]      Aggressive behavior 12/13/2020     Priority: Medium    AVA (generalized anxiety disorder) 11/16/2020     Priority: Medium    Chronic bilateral low back pain without sciatica 01/17/2020     Priority: Medium    Urinary retention 06/29/2019     Priority: Medium    Nausea 02/25/2019     Priority: Medium    Cyst of left ovary 11/05/2018     Priority: Medium    Nephrolithiasis 08/29/2018     Priority: Medium    Auditory hallucination 02/26/2018     Priority: Medium    Anxiety 01/05/2018     Priority: Medium    Schizoaffective disorder, bipolar type (H) 06/30/2017     Priority: Medium    PTSD (post-traumatic stress disorder) 06/30/2017     Priority: Medium    Cauda equina syndrome with neurogenic bladder (H) 01/20/2017     Priority: Medium    Moderate episode of recurrent major depressive disorder (H) 01/17/2017     Priority: Medium    Borderline personality disorder (H) 12/27/2016     Priority: Medium    History of heroin abuse (H) 12/27/2016     Priority: Medium    Optic neuritis 03/04/2016     Priority: Medium     From recent dx of optic neuritis w/ vision loss, and iritis. Received infusions x 4 of solumedrol at [facility]. MRI done of head as well which was normal. Spinal tap looked ok per patient.        Intractable back pain 09/20/2015     Priority: Medium    Depression 02/14/2015     Priority: Medium    Cannabis use disorder, severe, dependence (H) 08/22/2013     Priority: Medium    Episodic mood disorder (H24) 08/22/2013     Priority: Medium    Opioid use disorder, severe, dependence (H) 08/22/2013     Priority: Medium    Substance-induced psychotic disorder with hallucinations (H) 08/22/2013     Priority: Medium    GERD (gastroesophageal reflux disease) 07/08/2013     Priority: Medium    Tobacco abuse 07/08/2013     Priority: Medium    Marijuana abuse 05/31/2013     Priority: Medium      Daily.  Some use of MDMA and cocaine in past and recently had a 4 day break where she doesn't recall getting lost in woods.       Polysubstance abuse (H) 05/31/2013     Priority: Medium     Marijuana daily, Xanax periodically.  MDMA a couple times and cocaine. Narcotics and over the counter. Dextromethorphan with overdose 5/1/16 at [facility].  CD recommended.       Bipolar 1 disorder, manic, mild 01/13/2012     Priority: Medium     Close to meeting criteria for bipolar 1? Reji Dey.  Psychology feels bipolar may be appropriate diagnosis although subsequent evaluation by psychiatry at [facility] felt depression with borderline personality.  Will return for med discussion with preference for Lithium 300 two times daily with goal 12 hour trough 0.8-1.2.  March 26, 2012 - intitiated Li and seemed to help some but stopped due to shakiness.  Lamotrigine trial as having more depression issues 25/d, up to two times daily then gradually increase to 100-200 mg daily.  Consider olazapine for thought disturbance. Has not wanted medications but used friends Xanax.  Prozac plus olazapine good next option once GI issues worked through. Patient very resistent to medications.  Continue with Reji.   May 31, 2013 - patient likely in a hypomanic/manic phase right now but no signs or symptoms of dangerous behaviors or thought processes.  Trial of olanzapine and fluoxetine. Didn't like olanzapine. Stopped as inpt for non-suicidal overdose at [facility]. Pyschiatry, psychology and continue with Prozac.  Now agreeing to take prozac and seroquel. Stopped Prozac on her own because didn't notice difference.  Trazodone didn't help. Stopped all. Possible haven?  Restart quetiapine for minor hallucinations.       ADHD (attention deficit hyperactivity disorder) 09/09/2011     Priority: Medium     Adderall ineffective.  Better on Concerta.  Still with anger issues predating medications. Declines counseling. Suggested vocational assessment.  Trial of  guanfacine adjunct for anger but didn't help. Would avoid controlled substances given CD issues and impulsivity.        Past Medical History:   Diagnosis Date    ADHD (attention deficit hyperactivity disorder)     Bipolar 1 disorder     Borderline personality disorder     Cauda equina syndrome     Chronic low back pain     Depression     Diabetes mellitus, type 2 (H) 1/19/2023    GERD (gastroesophageal reflux disease)     h/o TBI (traumatic brain injury)     Hypertension, unspecified type 12/16/2021    Marginal corneal ulcer, left 07/17/2015    Nephrolithiasis     obesity     Polysubstance abuse - methamphetamine, hallucinagen, heroin, marijuana     currently in remission    PONV (postoperative nausea and vomiting)     PTSD (post-traumatic stress disorder)      Past Surgical History:   Procedure Laterality Date    BACK SURGERY  12/24/2016    BACK SURGERY - For Cauda Equina Syndrome  12/24/2016    COLONOSCOPY      COMBINED CYSTOSCOPY, INSERT STENT URETER(S) Left 8/30/2018    Procedure: COMBINED CYSTOSCOPY, INSERT STENT URETER(S);  Cystoscopy With Left Stent Placement;  Surgeon: Kiran Ulrich MD;  Location: [facility]    COMBINED CYSTOSCOPY, RETROGRADES, EXCHANGE STENT URETER(S) Left 10/14/2018    Procedure: COMBINED CYSTOSCOPY, RETROGRADES, EXCHANGE STENT URETER(S);  Cystoscopy and removal of left-sided stent.;  Surgeon: Stiven Olivo MD;  Location:  OR    COMBINED CYSTOSCOPY, RETROGRADES, URETEROSCOPY, INSERT STENT  1/6/2014    Procedure: COMBINED CYSTOSCOPY, RETROGRADES, URETEROSCOPY, INSERT STENT;  Cystyoscopy place left ureteral stent;  Surgeon: Jaun Kimble MD;  Location: [facility] OR    COMBINED CYSTOSCOPY, RETROGRADES, URETEROSCOPY, INSERT STENT Left 10/23/2018    Procedure: Video cystoscopy, left ureteroscopy with stone extraction;  Surgeon: Bull Mast MD;  Location:  OR    CYSTOSCOPY, URETEROSCOPY, COMBINED Right 9/23/2015    Procedure: COMBINED CYSTOSCOPY, URETEROSCOPY;   Surgeon: ROME Jett MD;  Location: [facility] OR    ENT SURGERY      ESOPHAGOSCOPY, GASTROSCOPY, DUODENOSCOPY (EGD), COMBINED  4/8/2013    Procedure: COMBINED ESOPHAGOSCOPY, GASTROSCOPY, DUODENOSCOPY (EGD), BIOPSY SINGLE OR MULTIPLE;  Gastroscopy;  Surgeon: Peter Pickard MD;  Location: [facility]    ESOPHAGOSCOPY, GASTROSCOPY, DUODENOSCOPY (EGD), COMBINED Left 10/28/2014    Procedure: COMBINED ESOPHAGOSCOPY, GASTROSCOPY, DUODENOSCOPY (EGD), BIOPSY SINGLE OR MULTIPLE;  Surgeon: Narcisa Ramirez MD;  Location:  OR    ESOPHAGOSCOPY, GASTROSCOPY, DUODENOSCOPY (EGD), COMBINED N/A 12/24/2018    Procedure: COMBINED ESOPHAGOSCOPY, GASTROSCOPY, DUODENOSCOPY (EGD), BIOPSY SINGLE OR MULTIPLE;  Surgeon: Sonu Verduzco MD;  Location: [facility]    INJECT EPIDURAL TRANSFORAMINAL LUMBAR / SACRAL EA ADDITIONAL LEVEL Left 3/18/2021    Procedure: Left L5/S1 transforaminal injection for selective L5 nerve root block;  Surgeon: Eliza Pagan MD;  Location: [facility] OR    LAPAROSCOPIC CHOLECYSTECTOMY  11/20/2014    [facility], Dr. Ramirez    LASER HOLMIUM LITHOTRIPSY URETER(S), INSERT STENT, COMBINED  4/2/2014    Procedure: COMBINED CYSTOSCOPY, URETEROSCOPY, LASER HOLMIUM LITHOTRIPSY URETER(S), INSERT STENT;  Cystoscopy,Left Ureteral Stent Removal,Left Ureteroscopy with Laser Lithotripsy,Left Ureter Stent Placement;  Surgeon: ROME Jett MD;  Location: [facility] OR    Transurethral stone resection  03/11/2011     Current Outpatient Medications   Medication Sig Dispense Refill    ACE/ARB/ARNI NOT PRESCRIBED (INTENTIONAL) Please choose reason not prescribed from choices below.      amLODIPine (NORVASC) 10 MG tablet Take 1 tablet (10 mg) by mouth daily 90 tablet 1    ARIPiprazole (ABILIFY) 5 MG tablet Take 1 tablet (5 mg) by mouth every morning 30 tablet 0    baclofen (LIORESAL) 10 MG tablet Take 1 tablet (10 mg) by mouth 2 times daily Do not stop suddenly. For muscle relaxation 60 tablet 0    benzoyl peroxide 5 % external liquid Apply  topically daily 226 g 11    blood glucose (NO BRAND SPECIFIED) test strip Use to test blood sugar one times daily and as needed. To accompany: Blood Glucose Monitor Brands: per insurance. 100 strip 3    clindamycin (CLEOCIN T) 1 % external lotion Apply topically 2 times daily 60 mL 11    clonazePAM (KLONOPIN) 0.5 MG tablet Take 0.5 tablets (0.25 mg) by mouth 2 times daily 30 tablet 0    clonazePAM (KLONOPIN) 0.5 MG tablet Take 0.5 tablets (0.25 mg) by mouth 2 times daily as needed for anxiety 30 tablet 0    deutetrabenazine (AUSTEDO) 6 MG tablet Take 1 tablet (6 mg) by mouth daily 30 tablet 1    doxycycline hyclate (VIBRAMYCIN) 100 MG capsule Take 1 capsule (100 mg) by mouth every 12 hours 60 capsule 11    empagliflozin (JARDIANCE) 10 MG TABS tablet Take 1 tablet (10 mg) by mouth daily +++NEED APPOINTMENT+++ 90 tablet 0    fluPHENAZine (PROLIXIN) 1 MG tablet Take 2 tablets (2 mg) by mouth 2 times daily 120 tablet 0    fluPHENAZine (PROLIXIN) 5 MG tablet Take 1 tablet (5 mg) by mouth 2 times daily as needed (agitation, psychosis) 60 tablet 0    fluPHENAZine decanoate (PROLIXIN) 25 MG/ML injection Inject 1 mL (25 mg) into the muscle every 14 days 5 mL 0    gabapentin (NEURONTIN) 600 MG tablet Take 2 tablets by mouth in the morning, 1 tablet in the afternoon, and 2 tablets in the evening (total 5 tablets daily). 150 tablet 0    gabapentin (NEURONTIN) 600 MG tablet Take 1 tablet (600 mg) by mouth 3 times daily 90 tablet 2    insulin glargine (LANTUS PEN) 100 UNIT/ML pen Inject 25 Units Subcutaneous every morning (before breakfast) 30 mL 1    insulin pen needle (32G X 4 MM) 32G X 4 MM miscellaneous Use 1 pen needles daily or as directed. 100 each 3    lithium (ESKALITH CR/LITHOBID) 450 MG CR tablet Take 2 tablets (900 mg) by mouth at bedtime 60 tablet 0    nicotine (NICODERM CQ) 21 MG/24HR 24 hr patch Place 1 patch onto the skin every 24 hours 30 patch 3    OLANZapine zydis (ZYPREXA) 10 MG ODT Take 1 tablet (10 mg) by  mouth 3 times daily as needed (psychosis) 20 tablet 0    PARoxetine (PAXIL) 10 MG tablet Take 1 tablet (10 mg) by mouth daily 30 tablet 0    QUEtiapine (SEROQUEL) 200 MG tablet Take 1 tablet (200 mg) by mouth at bedtime 30 tablet 2    QUEtiapine (SEROQUEL) 25 MG tablet Take 1 tablet (25 mg) by mouth every 6 hours as needed 90 tablet 0    QUEtiapine (SEROQUEL) 50 MG tablet Take 1 tablet (50 mg) by mouth at bedtime 30 tablet 1    rosuvastatin (CRESTOR) 40 MG tablet Take 1 tablet (40 mg) by mouth daily 90 tablet 3    semaglutide (OZEMPIC) 2 MG/3ML pen Inject 0.25 mg Subcutaneous every 7 days for 30 days, THEN 0.5 mg every 7 days for 60 days. 9 mL 0    testosterone (ANDROGEL/TESTIM) 50 MG/5GM (1%) topical gel Place 2 packets (100 mg of testosterone) onto the skin daily 30 packet 2    thin (NO BRAND SPECIFIED) lancets Use with lanceting device to check blood sugars once per day. To accompany: Blood Glucose Monitor Brands: per insurance. 100 each 3    Turmeric 500 MG CAPS Take 1 capsule (500 mg) by mouth daily 30 capsule 11    Turmeric 500 MG CAPS Take 1 capsule by mouth daily      valACYclovir (VALTREX) 1000 mg tablet Take 2 tablets (2,000 mg) by mouth 2 times daily 8 tablet 2       Allergies   Allergen Reactions    Haldol [Haloperidol] Other (See Comments)     Makes patient very angry and anxious    Adhesive Tape Hives    Percocet [Oxycodone-Acetaminophen] Nausea and Vomiting    Prednisone Other (See Comments) and Hives     Suicidal ideation    Risperidone Other (See Comments)    Tramadol Hcl Nausea and Vomiting    Droperidol Anxiety    Seroquel [Quetiapine] Palpitations     Spent 2 weeks in the hospital due to having seroquel, caused palpitations and QT prolongation        Social History     Tobacco Use    Smoking status: Former     Current packs/day: 0.00     Average packs/day: 0.5 packs/day for 11.1 years (5.6 ttl pk-yrs)     Types: Other, Cigarettes     Start date: 8/1/2011     Quit date: 9/14/2022     Years since  "quittin.8     Passive exposure: Never    Smokeless tobacco: Former     Types: Chew     Quit date: 2019    Tobacco comments:     Just nicotine gum currently. 23   Substance Use Topics    Alcohol use: Yes     History   Drug Use Unknown     Comment: denies using oxy or other opiates_\"not for years\"           Objective    There were no vitals taken for this visit.   Estimated body mass index is 36.15 kg/m  as calculated from the following:    Height as of 24: 1.676 m (5' 6\").    Weight as of 24: 101.6 kg (224 lb).  Physical Exam  Constitutional:       Appearance: Normal appearance. He is well-developed.   HENT:      Head: Normocephalic and atraumatic.      Right Ear: Tympanic membrane and external ear normal. No middle ear effusion.      Left Ear: Tympanic membrane and external ear normal.  No middle ear effusion.      Nose: No mucosal edema.      Mouth/Throat:      Pharynx: Uvula midline.   Eyes:      Pupils: Pupils are equal, round, and reactive to light.   Neck:      Thyroid: No thyromegaly.      Vascular: No carotid bruit.   Cardiovascular:      Rate and Rhythm: Normal rate and regular rhythm.      Heart sounds: Normal heart sounds.   Pulmonary:      Effort: Pulmonary effort is normal.      Breath sounds: Normal breath sounds.   Chest:   Breasts:     Breasts are symmetrical.      Right: No inverted nipple, mass, nipple discharge, skin change or tenderness.      Left: No inverted nipple, mass, nipple discharge, skin change or tenderness.   Abdominal:      General: Bowel sounds are normal.      Palpations: Abdomen is soft.      Tenderness: There is no abdominal tenderness.   Genitourinary:     Labia:         Right: No lesion.         Left: No lesion.       Vagina: Normal. No vaginal discharge or erythema.      Cervix: No cervical motion tenderness or discharge.      Uterus: Not enlarged.       Adnexa:         Right: No mass or tenderness.          Left: No mass or tenderness.   " "  Musculoskeletal:         General: Normal range of motion.      Cervical back: Normal range of motion.   Skin:     General: Skin is warm and dry.      Findings: No rash.   Neurological:      Mental Status: He is alert.   Psychiatric:         Behavior: Behavior normal.         Diagnostics     Lab Results   Component Value Date    WBC 11.3 08/07/2024    WBC 13.1 05/19/2021     Lab Results   Component Value Date    RBC 5.55 08/07/2024    RBC 4.95 05/19/2021     Lab Results   Component Value Date    HGB 15.8 08/07/2024    HGB 13.6 05/19/2021     Lab Results   Component Value Date    HCT 47.4 08/07/2024    HCT 41.6 05/19/2021     No components found for: \"MCT\"  Lab Results   Component Value Date    MCV 85 08/07/2024    MCV 84 05/19/2021     Lab Results   Component Value Date    MCH 28.5 08/07/2024    MCH 27.5 05/19/2021     Lab Results   Component Value Date    MCHC 33.3 08/07/2024    MCHC 32.7 05/19/2021     Lab Results   Component Value Date    RDW 14.1 08/07/2024    RDW 14.9 05/19/2021     Lab Results   Component Value Date     08/07/2024     05/19/2021     Recent Labs   Lab Test 08/07/24  1452 04/19/24  0411    143   POTASSIUM 3.9 3.7   CHLORIDE 107 108*   CO2 22 18*   ANIONGAP 13 17*   * 145*   BUN 9.2 13.7   CR 0.80 0.70   WILLIAMS 10.0 8.6     Lab Results   Component Value Date    A1C 6.7 08/07/2024    A1C 7.8 03/13/2024    A1C 7.8 09/26/2023    A1C 8.8 08/10/2023    A1C 9.1 05/05/2023    A1C 5.8 03/19/2021    A1C 5.8 11/05/2018    A1C 5.8 10/03/2017       Declines HCG-reports is not sexually active. Informed that will need to have urine HCG done AM of surgery-is agreeable.     No EKG required, no history of coronary heart disease, significant arrhythmia, peripheral arterial disease or other structural heart disease.    Revised Cardiac Risk Index (RCRI)  The patient has the following serious cardiovascular risks for perioperative complications:   - Diabetes Mellitus (on Insulin) = 1 point "     RCRI Interpretation: 1 point: Class II (low risk - 0.9% complication rate)         Signed Electronically by: BROOKLYN Cruz CNP  A copy of this evaluation report is provided to the requesting physician.       Answers submitted by the patient for this visit:  Patient Health Questionnaire (Submitted on 8/7/2024)  If you checked off any problems, how difficult have these problems made it for you to do your work, take care of things at home, or get along with other people?: Not difficult at all  PHQ9 TOTAL SCORE: 0

## 2024-08-08 LAB
ALBUMIN SERPL BCG-MCNC: 4.8 G/DL (ref 3.5–5.2)
ALP SERPL-CCNC: 72 U/L (ref 40–150)
ALT SERPL W P-5'-P-CCNC: 61 U/L (ref 0–70)
ANION GAP SERPL CALCULATED.3IONS-SCNC: 13 MMOL/L (ref 7–15)
AST SERPL W P-5'-P-CCNC: 49 U/L (ref 0–45)
BILIRUB SERPL-MCNC: 0.4 MG/DL
BUN SERPL-MCNC: 9.2 MG/DL (ref 6–20)
CALCIUM SERPL-MCNC: 10 MG/DL (ref 8.8–10.4)
CHLORIDE SERPL-SCNC: 107 MMOL/L (ref 98–107)
CHOLEST SERPL-MCNC: 107 MG/DL
CREAT SERPL-MCNC: 0.8 MG/DL (ref 0.51–1.17)
EGFRCR SERPLBLD CKD-EPI 2021: >90 ML/MIN/1.73M2
FASTING STATUS PATIENT QL REPORTED: YES
FASTING STATUS PATIENT QL REPORTED: YES
FERRITIN SERPL-MCNC: 79 NG/ML (ref 6–409)
GLUCOSE SERPL-MCNC: 118 MG/DL (ref 70–99)
HCO3 SERPL-SCNC: 22 MMOL/L (ref 22–29)
HDLC SERPL-MCNC: 40 MG/DL
IRON BINDING CAPACITY (ROCHE): 383 UG/DL (ref 240–430)
IRON SATN MFR SERPL: 17 % (ref 15–46)
IRON SERPL-MCNC: 67 UG/DL (ref 37–157)
LDLC SERPL CALC-MCNC: 34 MG/DL
NONHDLC SERPL-MCNC: 67 MG/DL
POTASSIUM SERPL-SCNC: 3.9 MMOL/L (ref 3.4–5.3)
PROT SERPL-MCNC: 8.2 G/DL (ref 6.4–8.3)
SODIUM SERPL-SCNC: 142 MMOL/L (ref 135–145)
TRIGL SERPL-MCNC: 165 MG/DL
TSH SERPL DL<=0.005 MIU/L-ACNC: 1.37 UIU/ML (ref 0.3–4.2)

## 2024-08-11 ENCOUNTER — MYC MEDICAL ADVICE (OUTPATIENT)
Dept: FAMILY MEDICINE | Facility: CLINIC | Age: 34
End: 2024-08-11
Payer: MEDICARE

## 2024-08-12 DIAGNOSIS — E78.5 HYPERLIPIDEMIA LDL GOAL <100: ICD-10-CM

## 2024-08-12 DIAGNOSIS — F41.1 GAD (GENERALIZED ANXIETY DISORDER): ICD-10-CM

## 2024-08-12 DIAGNOSIS — I10 HYPERTENSION, UNSPECIFIED TYPE: ICD-10-CM

## 2024-08-12 DIAGNOSIS — Z79.4 TYPE 2 DIABETES MELLITUS WITH HYPERGLYCEMIA, WITH LONG-TERM CURRENT USE OF INSULIN (H): ICD-10-CM

## 2024-08-12 DIAGNOSIS — L70.0 ACNE VULGARIS: Chronic | ICD-10-CM

## 2024-08-12 DIAGNOSIS — F25.9 SCHIZOAFFECTIVE DISORDER, CHRONIC CONDITION WITH ACUTE EXACERBATION (H): ICD-10-CM

## 2024-08-12 DIAGNOSIS — E11.65 TYPE 2 DIABETES MELLITUS WITH HYPERGLYCEMIA, WITHOUT LONG-TERM CURRENT USE OF INSULIN (H): ICD-10-CM

## 2024-08-12 DIAGNOSIS — F64.9 GENDER INCONGRUENCE: ICD-10-CM

## 2024-08-12 DIAGNOSIS — G24.01 TARDIVE DYSKINESIA: ICD-10-CM

## 2024-08-12 DIAGNOSIS — M54.42 CHRONIC BILATERAL LOW BACK PAIN WITH LEFT-SIDED SCIATICA: ICD-10-CM

## 2024-08-12 DIAGNOSIS — G89.29 CHRONIC BILATERAL LOW BACK PAIN WITH LEFT-SIDED SCIATICA: ICD-10-CM

## 2024-08-12 DIAGNOSIS — E11.65 TYPE 2 DIABETES MELLITUS WITH HYPERGLYCEMIA, WITH LONG-TERM CURRENT USE OF INSULIN (H): ICD-10-CM

## 2024-08-12 DIAGNOSIS — F39 MOOD DISORDER (H): ICD-10-CM

## 2024-08-12 DIAGNOSIS — B00.1 HERPES LABIALIS: ICD-10-CM

## 2024-08-12 NOTE — TELEPHONE ENCOUNTER
Prakash Arreola Nurse [facility] (supporting Becki Avery, APRN CNP)Yesterday (2:51 PM)       Becki,      I moved last Friday and I need my physical health medications moved to [facility] ([address] [place] [address]). Also, is it possible to get a 3 month supply of everything?      - Prakash

## 2024-08-12 NOTE — TELEPHONE ENCOUNTER
I am concerned that patient is taking both Ozempic/semaglutide and Rybelsus/semaglutide.  Please call patient and clarify and ensure that is not taking both of these medications.  Appears he wants to remain on Rybelsus please confirm.  Also, please confirm when he last took Lantus insulin and how much has been taking.    Becki CARVALHOC

## 2024-08-13 ENCOUNTER — MYC REFILL (OUTPATIENT)
Dept: FAMILY MEDICINE | Facility: CLINIC | Age: 34
End: 2024-08-13
Payer: MEDICARE

## 2024-08-13 DIAGNOSIS — F64.9 GENDER INCONGRUENCE: ICD-10-CM

## 2024-08-13 RX ORDER — GABAPENTIN 600 MG/1
TABLET ORAL
Qty: 150 TABLET | Refills: 0 | OUTPATIENT
Start: 2024-08-13

## 2024-08-13 RX ORDER — ARIPIPRAZOLE 5 MG/1
5 TABLET ORAL EVERY MORNING
Qty: 30 TABLET | Refills: 0 | Status: CANCELLED | OUTPATIENT
Start: 2024-08-13

## 2024-08-13 RX ORDER — AMLODIPINE BESYLATE 10 MG/1
10 TABLET ORAL DAILY
Qty: 90 TABLET | Refills: 0 | Status: ON HOLD | OUTPATIENT
Start: 2024-08-13 | End: 2024-09-27

## 2024-08-13 RX ORDER — LITHIUM CARBONATE 450 MG
900 TABLET, EXTENDED RELEASE ORAL AT BEDTIME
Qty: 60 TABLET | Refills: 0 | Status: CANCELLED | OUTPATIENT
Start: 2024-08-13

## 2024-08-13 RX ORDER — LANCETS
EACH MISCELLANEOUS
Qty: 100 EACH | Refills: 2 | Status: SHIPPED | OUTPATIENT
Start: 2024-08-13 | End: 2024-08-13

## 2024-08-13 RX ORDER — DOXYCYCLINE 100 MG/1
100 CAPSULE ORAL EVERY 12 HOURS
Qty: 60 CAPSULE | Refills: 11 | Status: CANCELLED | OUTPATIENT
Start: 2024-08-13

## 2024-08-13 RX ORDER — QUETIAPINE FUMARATE 200 MG/1
200 TABLET, FILM COATED ORAL AT BEDTIME
Qty: 30 TABLET | Refills: 2 | Status: CANCELLED | OUTPATIENT
Start: 2024-08-13

## 2024-08-13 RX ORDER — CLONAZEPAM 0.5 MG/1
0.25 TABLET ORAL 2 TIMES DAILY
Qty: 30 TABLET | Refills: 0 | Status: CANCELLED | OUTPATIENT
Start: 2024-08-13

## 2024-08-13 RX ORDER — LISDEXAMFETAMINE DIMESYLATE 30 MG/1
30 CAPSULE ORAL EVERY MORNING
Status: CANCELLED | OUTPATIENT
Start: 2024-08-13

## 2024-08-13 RX ORDER — ROSUVASTATIN CALCIUM 40 MG/1
40 TABLET, COATED ORAL DAILY
Qty: 30 TABLET | Refills: 2 | Status: SHIPPED | OUTPATIENT
Start: 2024-08-13 | End: 2024-08-13

## 2024-08-13 RX ORDER — AMLODIPINE BESYLATE 10 MG/1
10 TABLET ORAL DAILY
Qty: 30 TABLET | Refills: 2 | Status: SHIPPED | OUTPATIENT
Start: 2024-08-13 | End: 2024-08-13

## 2024-08-13 RX ORDER — VALACYCLOVIR HYDROCHLORIDE 1 G/1
2000 TABLET, FILM COATED ORAL 2 TIMES DAILY
Qty: 8 TABLET | Refills: 2 | Status: SHIPPED | OUTPATIENT
Start: 2024-08-13 | End: 2024-08-13

## 2024-08-13 RX ORDER — FLUPHENAZINE HYDROCHLORIDE 5 MG/1
5 TABLET ORAL 2 TIMES DAILY PRN
Qty: 60 TABLET | Refills: 0 | Status: CANCELLED | OUTPATIENT
Start: 2024-08-13

## 2024-08-13 RX ORDER — TESTOSTERONE GEL, 1% 10 MG/G
100 GEL TRANSDERMAL DAILY
Qty: 30 PACKET | Refills: 2 | Status: CANCELLED | OUTPATIENT
Start: 2024-08-13

## 2024-08-13 RX ORDER — ROSUVASTATIN CALCIUM 40 MG/1
40 TABLET, COATED ORAL DAILY
Qty: 30 TABLET | Refills: 2 | Status: SHIPPED | OUTPATIENT
Start: 2024-08-13

## 2024-08-13 RX ORDER — LANCETS
EACH MISCELLANEOUS
Qty: 100 EACH | Refills: 2 | Status: SHIPPED | OUTPATIENT
Start: 2024-08-13

## 2024-08-13 RX ORDER — VALACYCLOVIR HYDROCHLORIDE 1 G/1
2000 TABLET, FILM COATED ORAL 2 TIMES DAILY
Qty: 8 TABLET | Refills: 2 | Status: SHIPPED | OUTPATIENT
Start: 2024-08-13

## 2024-08-13 RX ORDER — QUETIAPINE FUMARATE 50 MG/1
50 TABLET, FILM COATED ORAL AT BEDTIME
Qty: 30 TABLET | Refills: 1 | Status: CANCELLED | OUTPATIENT
Start: 2024-08-13

## 2024-08-13 RX ORDER — PAROXETINE 10 MG/1
10 TABLET, FILM COATED ORAL DAILY
Qty: 30 TABLET | Refills: 0 | Status: CANCELLED | OUTPATIENT
Start: 2024-08-13

## 2024-08-13 RX ORDER — FLUPHENAZINE DECANOATE 25 MG/ML
25 INJECTION, SOLUTION INTRAMUSCULAR; SUBCUTANEOUS
Qty: 5 ML | Refills: 0 | Status: CANCELLED | OUTPATIENT
Start: 2024-08-13

## 2024-08-13 RX ORDER — FLUPHENAZINE HYDROCHLORIDE 1 MG/1
2 TABLET ORAL 2 TIMES DAILY
Qty: 120 TABLET | Refills: 0 | Status: CANCELLED | OUTPATIENT
Start: 2024-08-13

## 2024-08-13 NOTE — TELEPHONE ENCOUNTER
Pt states that he is now taking 0.5 mg dose of Ozempic and would like refill at this dose. Rx pended.    I processed the ones that I could that are prescribed by primary care Becki Avery APRN CNP.    All medications below were pended. Writer un-pended these and notified patient that he should reach out to specialty regarding having prescriptions re-sent. He verbalized understanding.    Pt will need to contact Regine Last APRN CNP (psychiatry) directly to request refill transfer regarding the medications that they are prescribing:  Aripiprazole  Clonazepam  deutetrabenazine   fluPHENAZine (PROLIXIN)   PARoxetine   Lithium  Quetiapine    Sonia Perla MD regarding testosterone     Mark Cleary MD (dermatology) regarding doxycycline hyclate (VIBRAMYCIN) 100 MG capsule      Lelia Jennings RN

## 2024-08-13 NOTE — TELEPHONE ENCOUNTER

## 2024-08-13 NOTE — TELEPHONE ENCOUNTER
Called patient.    He stated that his stopped the Ozempic on 8/7/24, and is just taking Jardiance and Rybelsus.    Patient also stated that he has not taken any insulin for over a month.    Routed to PCP.    Tran CHOI RN  Triage Nurse  [facility]

## 2024-08-13 NOTE — TELEPHONE ENCOUNTER
Called patient, and he stated that he stopped the Ozempic on 8/7/24.  He was taking Ozempic, and Rybelsus at the same time, for about a month or two, before this.    Tran CHOI RN  Triage Nurse  [facility]

## 2024-08-14 DIAGNOSIS — M54.42 CHRONIC BILATERAL LOW BACK PAIN WITH LEFT-SIDED SCIATICA: ICD-10-CM

## 2024-08-14 DIAGNOSIS — G89.29 CHRONIC BILATERAL LOW BACK PAIN WITH LEFT-SIDED SCIATICA: ICD-10-CM

## 2024-08-14 RX ORDER — GABAPENTIN 600 MG/1
TABLET ORAL
Qty: 150 TABLET | Refills: 0 | Status: CANCELLED | OUTPATIENT
Start: 2024-08-14

## 2024-08-14 RX ORDER — TESTOSTERONE GEL, 1% 10 MG/G
100 GEL TRANSDERMAL DAILY
Qty: 30 PACKET | Refills: 2 | Status: SHIPPED | OUTPATIENT
Start: 2024-08-14

## 2024-08-14 NOTE — TELEPHONE ENCOUNTER
RN updated. He asked when he can pick this up next. RN encouraged reaching out to the [facility] pharmacy to determine  date based off of last fill date. He verbalized understanding.     Neeru Muro RN on 8/14/2024 at 2:35 PM

## 2024-08-14 NOTE — TELEPHONE ENCOUNTER
Patient needing this at a different pharmacy. He stated that he was told he cannot refill this until 8/17/24 but says he is currently out of medication and is leaving town. He stated he is taking this as current sig is written, totally 5 tabs daily. He thinks he may have lost a prescription.     [facility] pharmacy reports dispense dates of:     7/15/24 90 tabs  7/27/24 150 tabs dose increased     This would be considered  and early refill.   Patient would like a call when this is sent to new pharmacy.   We many need to give a verbal to refill early if provider agrees and is appropriate.     Thank you,  Kristin Machado RN

## 2024-08-14 NOTE — TELEPHONE ENCOUNTER
Patient needs a 4 day supply of this medication going out of town tomorrow.  Any questions please call

## 2024-08-14 NOTE — TELEPHONE ENCOUNTER
Unfortunately, I will not be able to fill early or give replacement prescription    Becki JENKINS

## 2024-08-21 ENCOUNTER — HOSPITAL ENCOUNTER (EMERGENCY)
Facility: CLINIC | Age: 34
Discharge: HOME | End: 2024-08-21
Attending: PSYCHIATRY & NEUROLOGY | Admitting: PSYCHIATRY & NEUROLOGY
Payer: MEDICARE

## 2024-08-21 VITALS
TEMPERATURE: 98.1 F | HEART RATE: 90 BPM | SYSTOLIC BLOOD PRESSURE: 125 MMHG | RESPIRATION RATE: 18 BRPM | OXYGEN SATURATION: 95 % | DIASTOLIC BLOOD PRESSURE: 82 MMHG

## 2024-08-21 DIAGNOSIS — F25.0 SCHIZOAFFECTIVE DISORDER, BIPOLAR TYPE (H): ICD-10-CM

## 2024-08-21 DIAGNOSIS — R44.0 AUDITORY HALLUCINATION: ICD-10-CM

## 2024-08-21 PROCEDURE — 99284 EMERGENCY DEPT VISIT MOD MDM: CPT | Performed by: PSYCHIATRY & NEUROLOGY

## 2024-08-21 PROCEDURE — 250N000013 HC RX MED GY IP 250 OP 250 PS 637: Performed by: PSYCHIATRY & NEUROLOGY

## 2024-08-21 PROCEDURE — 99283 EMERGENCY DEPT VISIT LOW MDM: CPT | Performed by: PSYCHIATRY & NEUROLOGY

## 2024-08-21 RX ORDER — FLUPHENAZINE HYDROCHLORIDE 10 MG/1
10 TABLET ORAL ONCE
Status: COMPLETED | OUTPATIENT
Start: 2024-08-21 | End: 2024-08-21

## 2024-08-21 RX ORDER — HYDROXYZINE HYDROCHLORIDE 50 MG/1
50 TABLET, FILM COATED ORAL ONCE
Status: COMPLETED | OUTPATIENT
Start: 2024-08-21 | End: 2024-08-21

## 2024-08-21 RX ORDER — QUETIAPINE FUMARATE 50 MG/1
50 TABLET, EXTENDED RELEASE ORAL ONCE
Status: COMPLETED | OUTPATIENT
Start: 2024-08-21 | End: 2024-08-21

## 2024-08-21 RX ADMIN — NICOTINE POLACRILEX 4 MG: 4 GUM, CHEWING BUCCAL at 17:44

## 2024-08-21 RX ADMIN — QUETIAPINE 50 MG: 50 TABLET, FILM COATED, EXTENDED RELEASE ORAL at 18:07

## 2024-08-21 RX ADMIN — FLUPHENAZINE HYDROCHLORIDE 10 MG: 10 TABLET, FILM COATED ORAL at 17:36

## 2024-08-21 RX ADMIN — HYDROXYZINE HYDROCHLORIDE 50 MG: 50 TABLET, FILM COATED ORAL at 18:07

## 2024-08-21 ASSESSMENT — COLUMBIA-SUICIDE SEVERITY RATING SCALE - C-SSRS
6. HAVE YOU EVER DONE ANYTHING, STARTED TO DO ANYTHING, OR PREPARED TO DO ANYTHING TO END YOUR LIFE?: NO
2. HAVE YOU ACTUALLY HAD ANY THOUGHTS OF KILLING YOURSELF IN THE PAST MONTH?: YES
4. HAVE YOU HAD THESE THOUGHTS AND HAD SOME INTENTION OF ACTING ON THEM?: NO
5. HAVE YOU STARTED TO WORK OUT OR WORKED OUT THE DETAILS OF HOW TO KILL YOURSELF? DO YOU INTEND TO CARRY OUT THIS PLAN?: NO

## 2024-08-21 ASSESSMENT — ACTIVITIES OF DAILY LIVING (ADL)
ADLS_ACUITY_SCORE: 37
ADLS_ACUITY_SCORE: 39
ADLS_ACUITY_SCORE: 39

## 2024-08-21 NOTE — DISCHARGE INSTRUCTIONS
Aftercare Plan    Follow up with established providers and supports as scheduled. Continue taking medications as prescribed. Abstain from drugs and alcohol. Utilize your [facility] mental health crisis team as needed. They are available 24/7. Contact information is listed below.     Next Steps...  Please continue following up with your [facility] worker, mental health , DBT therapist, and psychiatric provider for ongoing mental healthcare. Your mental health  is Jie David at [facility] and can be reached at [phone].    If I am feeling unsafe or I am in a crisis, I will:   Contact my established care providers  Call [facility] Crisis: [phone]   Call the National Suicide Prevention Lifeline: 988  Go to the nearest emergency room   Call 069     Warning signs that I or other people might notice when a crisis is developing for me: changes to sleep, appetite or mood, increased anger, agitation or irritability, feeling depressed or hopeless, spending more time alone or talking less, increased crying, decreased productivity, seeing or hearing things that aren't there, thoughts of not wanting to live anymore or of actually killing myself, thoughts of hurting others    Things I am able to do on my own to cope or help me feel better: watching a favorite tv show or movie, listening to music I enjoy, going outside and breathing fresh air, going for a walk or exercising, taking a shower or bath, a cold or hot beverage, a healthy snack, drawing/coloring/painting, journaling, singing or dancing, deep breathing     I can try practicing square breathing when I begin to feel anxious - inhale through the nose for the count of 4 and the first line on the square. Exhale through the mouth for the count of 4 for the second line of the square. Repeat to complete the square. Repeat the square as many times as needed.    I can also use my five senses to practice mindfulness and grounding. What are five things I  can see, four things I can hear, three things I can feel, two things I can smell, and one thing I can taste.     Things that I am able to do with others to cope or help me feel better: sometimes just talking or spending time with someone else, sharing a meal or having coffee, watching a movie or playing a game, going for a walk or exercising    I can also use community resources including mental health hotlines, [facility] crisis teams, or apps.     Things I can use or do for distraction: movies/tv, music, reading, games, drawing/coloring/painting or other art, essential oils, exercise, cleaning/organizing, puzzles, crossword puzzles, word search, Sudoku       I can also download a meditation or relaxation hiren, like Calm, Headspace, or Insight Timer (all three offer a free version)    Changes I can make to support my mental health and wellness: Attend scheduled mental health therapy and psychiatric appointments. Take my medications as prescribed. Maintain a daily schedule/routine. Abstain from all mood altering substances, including drugs, alcohol, or medications not currently prescribed to me. Implement a self-care routine.      People in my life that I can ask for help: friends or family, trusted teachers/staff/colleagues, trusted members of my community or place of [place], mental health crisis lines, or 911    Your [facility] has a mental health crisis team you can call 24/7: [facility], [phone]    Other things that are important when I m in crisis: to remember that the feelings I am having right now are temporary, and it won't feel like this forever, and that it is okay and important to ask for help    Crisis Lines  Crisis Text Line  Text 213903  You will be connected with a trained live crisis counselor to provide support.    Por espanol, texto  CHARITY a 792903 o texto a 442-AYUDAME en WhatsApp    National Hope Line  1.800.SUICIDE [4066147]      Community Resources  Fast Tracker  Linking people to mental  "health and substance use disorder resources  fastNuregockRIISnetn.Vhall     [place] Mental Health Warm Line  Peer to peer support  Monday thru Saturday, 12 pm to 10 pm  102.938.5950 or 9.073.422.4399  Text \"Support\" to 67436    National Firth on Mental Illness (GILMA)  918.424.3399 or 1.888.GILMA.HELPS      Mental Health Apps  My3  https://Envis.org/    VirtualHopeBox  https://Niche/apps/virtual-hope-box/      Additional Information  Today you were seen by a licensed mental health professional through Triage and Transition services, Behavioral Healthcare Providers ([facility])  for a crisis assessment in the Emergency Department at [facility].  It is recommended that you follow up with your established providers (psychiatrist, mental health therapist, and/or primary care doctor - as relevant) as soon as possible. Coordinators from [facility] will be calling you in the next 24-48 hours to ensure that you have the resources you need.  You can also contact [facility] coordinators directly at [phone]. You may have been scheduled for or offered an appointment with a mental health provider. [facility] maintains an extensive network of licensed behavioral health providers to connect patients with the services they need.  We do not charge providers a fee to participate in our referral network.  We match patients with providers based on a patient's specific needs, insurance coverage, and location.  Our first effort will be to refer you to a provider within your care system, and will utilize providers outside your care system as needed.        "

## 2024-08-21 NOTE — CONSULTS
"Diagnostic Evaluation Consultation  Crisis Assessment    Patient Name: Prakash Prasad  Age:  34 year old  Legal Sex: male  Gender Identity: transgender male  Pronouns: he/him/his  Race: White  Ethnicity: Not  or   Language: English      Patient was assessed: In person   Crisis Assessment Start Date: 08/21/24  Crisis Assessment Start Time: 1615  Crisis Assessment Stop Time: 1645  Patient location: [facility] EMERGENCY DEPARTMENT                                 Referral Data and Chief Complaint  Prakash Prasad presents to the ED by  self (Pt reports taking a Lyft to the hospital.). Patient is presenting to the ED for the following concerns: Suicidal ideation, Worsening psychosocial stress, Anxiety.   Factors that make the mental health crisis life threatening or complex are:  Pt presents to the ED via Lyft for worsening anxiety and suicidal ideation. Pt reports that he moved out of his group home approximately 2 weeks ago for increased independence. Over the past couple of days, he has been experiencing heightened anxiety due to this recent move and resulting transition. Pt currently rates his anxiety as a 10/10 and is visibly shaking, although denies recent panic attacks. Pt reports that he has experienced a return of auditory hallucinations over the past 2 days that he refers to as \"God\". Per pt, God has been telling him to cancel a scheduled gender affirmation surgery and to \"toughen up\" as he may find himself in a situation where he needs to fight, resulting in pt hitting walls in his apartment. Pt tells this writer that he smoked marijuana today to cope with these symptoms, leading to paranoia and suicidal ideation. Pt reports feeling that people are watching him, which is \"creeping him out\". He also endorses a current plan to overdose on methamphetamines as he \"would do anything to shut his brain off\". Pt denies intent to act on his plan, reporting that he voluntarily brought himself " to the hospital to seek help. Although pt has a history of polysubstance use, he tells this writer that he is not using substances at present outside of prescribed medical marijuana for chronic pain. When asked about medication adherence, pt tells this writer that he has recently missed a few doses of his Paxil and stopped Vyvanse a couple of weeks ago as he knows this tends to worsen his mental health symptoms. Pt denies NSSI or HI. He also denies concerns for sleep or appetite..      Informed Consent and Assessment Methods  Explained the crisis assessment process, including applicable information disclosures and limits to confidentiality, assessed understanding of the process, and obtained consent to proceed with the assessment.  Assessment methods included conducting a formal interview with patient, review of medical records, collaboration with medical staff, and obtaining relevant collateral information from family and community providers when available.  : done     Patient response to interventions: acceptance expressed, verbalizes understanding  Coping skills were attempted to reduce the crisis:  Pt brought himself to the ED for emergency mental healthcare.     History of the Crisis   Pt is a trans man who uses he/him pronouns. Per chart review, pt carries diagnoses of schizoaffective disorder - bipolar type, BPD, PTSD, and ADHD. He has a history of polysubstance use involving opioids, methamphetamines, and THC. He reports not engaging in substance use outside of prescribed medical marijuana for the past 3 years. He is currently under a MI commitment through [facility]. He was last hospitalized at [facility] from 3/25-4/12/24 for psychosis. Chart review indicates that pt has a history of suicide attempts with last known attempt over 10 years ago.    Brief Psychosocial History  Family:  Single, Children no  Support System:  Parent(s), Friend  Employment Status:  disabled  Source of Income:  disability  Financial  Environmental Concerns:  none  Current Hobbies:  television/movies/videos  Barriers in Personal Life:  mental health concerns, emotional concerns    Significant Clinical History  Current Anxiety Symptoms:  anxious, excessive worry, racing thoughts  Current Depression/Trauma:  sense of doom, thoughts of death/suicide  Current Somatic Symptoms:  sweating, flushing, shaking, racing thoughts, excessive worry, anxious  Current Psychosis/Thought Disturbance:  auditory hallucinations (paranoia)  Current Eating Symptoms:     Chemical Use History:  Alcohol: None  Benzodiazepines: None  Opiates:  (history of opioid use)  Last Use::  (denies current use)  Cocaine: None  Marijuana: Occasional  Last Use:: 08/21/24  Other Use: Methamphetamines  Last Use::  (no current use)   Past diagnosis:  ADHD, Suicide attempt(s), PTSD, Substance Use Disorder, Other, Personality Disorder (schizoaffective disorder - bipolar type)  Family history:  No known history of mental health or chemical health concerns  Past treatment:  Individual therapy, Case management, Civil Commitment, Primary Care, Psychiatric Medication Management, Inpatient Hospitalization, Supportive Living Environment (group home, longterm house, etc), Critical access hospital/[facility]  Details of most recent treatment:  Pt has an established psychiatric provider, DBT therapist, Critical access hospital , and mental health .  Other relevant history:  No other relevant history.       Collateral Information  Is there collateral information: No (Pt identified his commitment  for collateral contact. This writer left a voicemail for pt's commitment  Jie David at [phone] requesting a call-back.)        Risk Assessment  [place] Suicide Severity Rating Scale Recent: 8/21/24  Suicidal Ideation (Recent)  Q1 Wished to be Dead (Past Month): yes  Q2 Suicidal Thoughts (Past Month): yes  Q3 Suicidal Thought Method: yes  Q4 Suicidal Intent without Specific Plan: no  Q5 Suicide  Intent with Specific Plan: no  Level of Risk per Screen: moderate risk  Intensity of Ideation (Recent)  Most Severe Ideation Rating (Past 1 Month): 3  Frequency (Past 1 Month): Once a week  Duration (Past 1 Month): 4-8 hours/most of day  Controllability (Past 1 Month): Can control thoughts with some difficulty  Deterrents (Past 1 Month): Deterrents probably stopped you  Reasons for Ideation (Past 1 Month): Equally to get attention, revenge, or a reaction from others and to end/stop the pain  Suicidal Behavior (Recent)  Actual Attempt (Past 3 Months): No  Total Number of Actual Attempts (Past 3 Months): 0  Has subject engaged in non-suicidal self-injurious behavior? (Past 3 Months): No  Interrupted Attempts (Past 3 Months): No  Total Number of Interrupted Attempts (Past 3 Months): 0  Aborted or Self-Interrupted Attempt (Past 3 Months): No  Total Number of Aborted or Self-Interrupted Attempts (Past 3 Months): 0  Preparatory Acts or Behavior (Past 3 Months): No  Total Number of Preparatory Acts (Past 3 Months): 0    Environmental or Psychosocial Events: other life stressors  Protective Factors: Protective Factors: strong bond to family unit, community support, or employment, supportive ongoing medical and mental health care relationships, help seeking    Does the patient have thoughts of harming others? Feels Like Hurting Others: no  Previous Attempt to Hurt Others: no  Is the patient engaging in sexually inappropriate behavior?: no    Is the patient engaging in sexually inappropriate behavior?  no        Mental Status Exam   Affect: Dramatic  Appearance: Appropriate  Attention Span/Concentration: Attentive  Eye Contact: Variable    Fund of Knowledge: Appropriate   Language /Speech Content: Fluent  Language /Speech Volume: Normal  Language /Speech Rate/Productions: Normal  Recent Memory: Intact  Remote Memory: Intact  Mood: Anxious  Orientation to Person: Yes   Orientation to Place: Yes  Orientation to Time of Day:  Yes  Orientation to Date: Yes     Situation (Do they understand why they are here?): Yes  Psychomotor Behavior: Normal  Thought Content: Hallucinations, Suicidal, Paranoia  Thought Form: Intact     Medication  Psychotropic medications:   Medication Orders - Psychiatric (From admission, onward)      Start     Dose/Rate Route Frequency Ordered Stop    08/21/24 1737  nicotine polacrilex (NICORETTE) gum 4 mg        Note to Pharmacy: DO NOT USE THIS FIELD FOR ADMIN INSTRUCTIONS; INFORMATION DOES NOT SHOW ON MAR. USE THE FIELD ABOVE MARKED ADMIN INSTRUCTIONS    4 mg Buccal EVERY 1 HOUR PRN 08/21/24 1737               Current Care Team  Patient Care Team:  Becki Avery APRN CNP as PCP - General (Family Medicine)  Kathie Sandhu MD as MD (Neurology)  Tessa Galvan, RN as Specialty Care Coordinator  Ashu Russell DO as MD (Psychiatry & Neurology - Neurology)  Becki Avery APRN CNP as Assigned PCP  Desmond West as Specialty Care Coordinator (Plastic Surgery)  Harvey Negron MD as MD (Psychiatry)  Dora Mazariegos, PhD (Student in organized health care education/training program)  Glenda Juan MD as Resident (Family Medicine)  Ayana  as   Regine Last APRN CNP as Nurse Practitioner (Psychiatry)  Oswaldo Amado MD as MD (Dermatology)  Regine Last APRN CNP as Assigned Behavioral Health Provider  Mark Cleary MD as MD (Dermatology)  Lucie Chan, RN as Specialty Care Coordinator (Psychiatry)  Ashu Russell DO as MD (Neurology)  Luz Moncada APRN CNP as Assigned Neuroscience Provider  Alice Tubbs [facility] as Pharmacist (Pharmacist)  Mark Cleary MD as Assigned Surgical Provider  Moriah Rojas [facility] as Pharmacist (Pharmacist)  Moriah Rojas RP as Assigned MTM Pharmacist  Leventhal, Thomas Michael, MD as MD (Gastroenterology)  Mark Cleary MD as MD  (Dermatology)  Ofelia Bess PA-C as Physician Assistant (Neurological Surgery)  Espinoza Jimenez MD as MD (Cardiovascular Disease)  Espinoza Jimenez MD as Assigned Heart and Vascular Provider  Kevin Pan MD as MD (OB/Gyn)  Kevin Pan MD as Assigned OBGYN Provider    Diagnosis  Patient Active Problem List   Diagnosis Code    ADHD (attention deficit hyperactivity disorder) F90.9    Bipolar 1 disorder, manic, mild F31.11    Marijuana abuse F12.10    Polysubstance abuse (H) F19.10    GERD (gastroesophageal reflux disease) K21.9    Tobacco abuse Z72.0    Intractable back pain M54.9    Optic neuritis H46.9    Cauda equina syndrome with neurogenic bladder (H) G83.4    Schizoaffective disorder, bipolar type (H) F25.0    PTSD (post-traumatic stress disorder) F43.10    Anxiety F41.9    Auditory hallucination R44.0    Nephrolithiasis N20.0    Cyst of left ovary N83.202    Borderline personality disorder (H) F60.3    Cannabis use disorder, severe, dependence (H) F12.20    Depression F32.A    Episodic mood disorder (H24) F39    History of heroin abuse (H) F11.11    Moderate episode of recurrent major depressive disorder (H) F33.1    Opioid use disorder, severe, dependence (H) F11.20    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    Nausea R11.0    Urinary retention R33.9    Chronic bilateral low back pain without sciatica M54.50, G89.29    AVA (generalized anxiety disorder) F41.1    Aggressive behavior R46.89    Lumbosacral radiculopathy at L5 M54.17    Abnormal uterine bleeding N93.9    Acanthosis nigricans L83    Schizoaffective disorder, chronic condition with acute exacerbation (H) F25.9    Bipolar affective disorder, mixed, severe, with psychotic behavior (H) F31.64    Schizophrenia, schizoaffective, chronic with acute exacerbation (H) F25.9    Akathisia G25.71    Hypertension, unspecified type I10    Female-to-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 person Z78.9    Morbid  obesity (H) E66.01    Mood disorder due to a general medical condition F06.30    Pain of right hand M79.641    Acne vulgaris L70.0    Type 2 diabetes mellitus with hyperglycemia, with long-term current use of insulin (H) E11.65, Z79.4    Herpes labialis B00.1       Primary Problem This Admission  Active Hospital Problems    Schizoaffective disorder, bipolar type (H) F25.0      PTSD (post-traumatic stress disorder) F43.10      Borderline personality disorder (H) F60.3      ADHD (attention deficit hyperactivity disorder) F90.9    Clinical Summary and Substantiation of Recommendations   It is the recommendation of this writer that pt discharge to follow-up with existing therapy, psychiatry, [facility], and mental health case management. Pt was initially requesting an observation stay in the ED but later expressed readiness to discharge. Pt reports a relief in symptoms after receiving PRN medications ordered by the attending provider in the ED. Although pt reported a suicidal plan to use methamphetamines, he denied intent, noting that he voluntarily brought himself to the ED for help and take pride in being sober from substances (outside of marijuana) for 3 years. Pt demonstrates insight into his psychotic symptoms and identify his hallucinations and paranoia as an extension of his mental health concerns.    Patient coping skills attempted to reduce the crisis:  Pt brought himself to the ED for emergency mental healthcare.    Disposition  Recommended disposition: Individual Therapy, Medication Management, Other. please comment (case management)        Reviewed case and recommendations with attending provider. Attending Name: Dr. Dean       Attending concurs with disposition: yes       Patient and/or validated legal guardian concurs with disposition:   yes       Final disposition:  discharge    Legal status on admission: Voluntary/Patient has signed consent for treatment    Assessment Details   Total duration spent with the  patient: 30 min     CPT code(s) utilized: 01110 - Psychotherapy for Crisis - 60 (30-74*) min    JULISA Hughes, Psychotherapist  [facility] - Triage & Transition Services  Callback: [phone]

## 2024-08-21 NOTE — ED PROVIDER NOTES
ED Provider Note  [facility]      History     Chief Complaint   Patient presents with    Hallucinations     Pt reports hearing voices, non specific muffled voices, denies command hallucinations but endorses passive suicidality with no plan    Suicidal     Pt endorses passive suicidal thoughts with no plan     HPI  Prakash Prasad is a 34 year old adult who is here as he is bothered by breakthrough voices. Patient has history of schizoaffective disorder and been in a group home. He moved out to independent living with support but found it too stressful. He has been smoking more THC to manage his distress. He continues to feel overwhelmed with the voices. He denies active SI. He does not feel he needs to be hospitalized and would prefer to go home which he would like to do if the voices are quieter. He has been taking prn Zyprexa without benefit.    Past Medical History  Past Medical History:   Diagnosis Date    ADHD (attention deficit hyperactivity disorder)     Bipolar 1 disorder     Borderline personality disorder     Cauda equina syndrome     Chronic low back pain     Depression     Diabetes mellitus, type 2 (H) 1/19/2023    GERD (gastroesophageal reflux disease)     h/o TBI (traumatic brain injury)     Hypertension, unspecified type 12/16/2021    Marginal corneal ulcer, left 07/17/2015    Nephrolithiasis     obesity     Polysubstance abuse - methamphetamine, hallucinagen, heroin, marijuana     currently in remission    PONV (postoperative nausea and vomiting)     PTSD (post-traumatic stress disorder)      Past Surgical History:   Procedure Laterality Date    BACK SURGERY  12/24/2016    BACK SURGERY - For Cauda Equina Syndrome  12/24/2016    COLONOSCOPY      COMBINED CYSTOSCOPY, INSERT STENT URETER(S) Left 8/30/2018    Procedure: COMBINED CYSTOSCOPY, INSERT STENT URETER(S);  Cystoscopy With Left Stent Placement;  Surgeon: Kiran Ulrich MD;  Location: [facility]    COMBINED  CYSTOSCOPY, RETROGRADES, EXCHANGE STENT URETER(S) Left 10/14/2018    Procedure: COMBINED CYSTOSCOPY, RETROGRADES, EXCHANGE STENT URETER(S);  Cystoscopy and removal of left-sided stent.;  Surgeon: Stiven Olivo MD;  Location:  OR    COMBINED CYSTOSCOPY, RETROGRADES, URETEROSCOPY, INSERT STENT  1/6/2014    Procedure: COMBINED CYSTOSCOPY, RETROGRADES, URETEROSCOPY, INSERT STENT;  Cystyoscopy place left ureteral stent;  Surgeon: Jaun Kimble MD;  Location: [facility]    COMBINED CYSTOSCOPY, RETROGRADES, URETEROSCOPY, INSERT STENT Left 10/23/2018    Procedure: Video cystoscopy, left ureteroscopy with stone extraction;  Surgeon: Bull Mast MD;  Location:  OR    CYSTOSCOPY, URETEROSCOPY, COMBINED Right 9/23/2015    Procedure: COMBINED CYSTOSCOPY, URETEROSCOPY;  Surgeon: ROME Jett MD;  Location: [facility]    ENT SURGERY      ESOPHAGOSCOPY, GASTROSCOPY, DUODENOSCOPY (EGD), COMBINED  4/8/2013    Procedure: COMBINED ESOPHAGOSCOPY, GASTROSCOPY, DUODENOSCOPY (EGD), BIOPSY SINGLE OR MULTIPLE;  Gastroscopy;  Surgeon: Peter Pickard MD;  Location: [facility]    ESOPHAGOSCOPY, GASTROSCOPY, DUODENOSCOPY (EGD), COMBINED Left 10/28/2014    Procedure: COMBINED ESOPHAGOSCOPY, GASTROSCOPY, DUODENOSCOPY (EGD), BIOPSY SINGLE OR MULTIPLE;  Surgeon: Narcisa Ramirez MD;  Location:  OR    ESOPHAGOSCOPY, GASTROSCOPY, DUODENOSCOPY (EGD), COMBINED N/A 12/24/2018    Procedure: COMBINED ESOPHAGOSCOPY, GASTROSCOPY, DUODENOSCOPY (EGD), BIOPSY SINGLE OR MULTIPLE;  Surgeon: Sonu Verduzco MD;  Location: [facility]    INJECT EPIDURAL TRANSFORAMINAL LUMBAR / SACRAL EA ADDITIONAL LEVEL Left 3/18/2021    Procedure: Left L5/S1 transforaminal injection for selective L5 nerve root block;  Surgeon: Eliza Pagan MD;  [place] [facility] [place]    [place]APAROSCOPIC CHOLECYSTECTOMY  11/20/2014    [facility], Dr. Ramirez    LASER HOLMIUM LITHOTRIPSY URETER(S), INSERT STENT, COMBINED  4/2/2014    Procedure: COMBINED CYSTOSCOPY, URETEROSCOPY,  LASER HOLMIUM LITHOTRIPSY URETER(S), INSERT STENT;  Cystoscopy,Left Ureteral Stent Removal,Left Ureteroscopy with Laser Lithotripsy,Left Ureter Stent Placement;  Surgeon: ROME Jett MD;  Location: [facility]    Transurethral stone resection  03/11/2011     ACE/ARB/ARNI NOT PRESCRIBED (INTENTIONAL)  amLODIPine (NORVASC) 10 MG tablet  ARIPiprazole (ABILIFY) 5 MG tablet  blood glucose (NO BRAND SPECIFIED) test strip  clonazePAM (KLONOPIN) 0.5 MG tablet  deutetrabenazine (AUSTEDO) 6 MG tablet  doxycycline hyclate (VIBRAMYCIN) 100 MG capsule  empagliflozin (JARDIANCE) 10 MG TABS tablet  fluPHENAZine (PROLIXIN) 1 MG tablet  fluPHENAZine (PROLIXIN) 5 MG tablet  fluPHENAZine decanoate (PROLIXIN) 25 MG/ML injection  gabapentin (NEURONTIN) 600 MG tablet  insulin glargine (LANTUS PEN) 100 UNIT/ML pen  insulin pen needle (32G X 4 MM) 32G X 4 MM miscellaneous  lisdexamfetamine (VYVANSE) 30 MG capsule  lithium (ESKALITH CR/LITHOBID) 450 MG CR tablet  oxyCODONE (ROXICODONE) 5 MG tablet  PARoxetine (PAXIL) 10 MG tablet  QUEtiapine (SEROQUEL) 200 MG tablet  QUEtiapine (SEROQUEL) 50 MG tablet  rosuvastatin (CRESTOR) 40 MG tablet  Semaglutide (RYBELSUS) 7 MG tablet  testosterone (ANDROGEL/TESTIM) 50 MG/5GM (1%) topical gel  thin (NO BRAND SPECIFIED) lancets  Turmeric 500 MG CAPS  valACYclovir (VALTREX) 1000 mg tablet      Allergies   Allergen Reactions    Haldol [Haloperidol] Other (See Comments)     Makes patient very angry and anxious    Adhesive Tape Hives    Prednisone Other (See Comments) and Hives     Suicidal ideation    Droperidol Anxiety     Family History  Family History   Problem Relation Age of Onset    Hyperlipidemia Mother     Mental Illness Mother     Anxiety Disorder Mother     Anesthesia Reaction Mother         Vomiting/Nausea    Other Cancer Mother     Skin Cancer Mother     Gastrointestinal Disease Father         Crohn's Disease    Cancer Father         Liver Cancer    Other Cancer Father         Liver    Obesity  "Father     Skin Cancer Father     No Known Problems Sister     Hypertension Brother     Other Cancer Brother         Esophagecial    Diabetes Brother     Obesity Brother     Hypertension Brother     Other Cancer Brother     Cancer Maternal Grandmother         lympoma    Diabetes Maternal Grandmother     Mental Illness Maternal Grandmother     Other Cancer Maternal Grandmother         Non Hodgkins Lymphoma    Diabetes Maternal Grandfather     Hypertension Maternal Grandfather     Prostate Cancer Maternal Grandfather     Hyperlipidemia Maternal Grandfather     Heart Disease Paternal Grandfather         heart disease    Other Cancer Paternal Half-Brother      Social History   Social History     Tobacco Use    Smoking status: Former     Current packs/day: 0.00     Average packs/day: 0.5 packs/day for 11.1 years (5.6 ttl pk-yrs)     Types: Other, Cigarettes     Start date: 2011     Quit date: 2022     Years since quittin.9     Passive exposure: Never    Smokeless tobacco: Former     Types: Chew     Quit date: 2019    Tobacco comments:     Just nicotine gum currently. 23   Vaping Use    Vaping status: Every Day    Substances: Nicotine, Flavoring    Devices: babbel tank   Substance Use Topics    Alcohol use: Yes    Drug use: Not Currently     Types: Marijuana, Opiates     Comment: denies using oxy or other opiates_\"not for years\"      A medically appropriate review of systems was performed with pertinent positives and negatives noted in the HPI, and all other systems negative.    Physical Exam   BP: 122/80  Pulse: 93  Temp: 98.1  F (36.7  C)  Resp: 18  SpO2: 93 %  Physical Exam  Vitals and nursing note reviewed.   HENT:      Head: Normocephalic.   Eyes:      Pupils: Pupils are equal, round, and reactive to light.   Pulmonary:      Effort: Pulmonary effort is normal.   Musculoskeletal:         General: Normal range of motion.      Cervical back: Normal range of motion.   Neurological:      " General: No focal deficit present.      Mental Status: He is alert.   Psychiatric:         Attention and Perception: Attention normal. He perceives auditory hallucinations.         Mood and Affect: Mood and affect normal.         Speech: Speech normal.         Behavior: Behavior normal. Behavior is not agitated, aggressive, hyperactive or combative. Behavior is cooperative.         Thought Content: Thought content is paranoid. Thought content does not include homicidal or suicidal ideation.         Cognition and Memory: Cognition and memory normal.         Judgment: Judgment normal.           ED Course, Procedures, & Data      Procedures       A consult was attained from the [facility] service. The case was discussed with the  from that service. The consulting service's recommendations were provided at 5 PM. 10 minutes spent reviewing prior records and interventions. 10 minutes spent discussing case, care and disposition.     -----     No results found for any visits on 08/21/24.  Medications   fluPHENAZine (PROLIXIN) tablet 10 mg (has no administration in time range)     Labs Ordered and Resulted from Time of ED Arrival to Time of ED Departure - No data to display  No orders to display          Critical care was not performed.     Medical Decision Making  The patient's presentation was of moderate complexity (a chronic illness mild to moderate exacerbation, progression, or side effect of treatment).    The patient's evaluation involved:  an assessment requiring an independent historian (see separate area of note for details)  review of external note(s) from 2 sources (see separate area of note for details)  review of 2 test result(s) ordered prior to this encounter (see separate area of note for details)    The patient's management necessitated moderate risk (prescription drug management including medications given in the ED) and moderate risk (limitations due to social determinants of health (healthcare access  difficulty and social isolation)).    Assessment & Plan    Patient is here seeking help for breakthrough AH that are bothering him. He feels recent move from a group home triggered his distress as he feel overwhelmed trying to manage self independently.    Patient was given a dose of prolixin 10 mg as a trial to manage the voices. He typically is prescribed 5 mg prn.    Patient did not feel the prolixin dose helped. He would like to try Seroquel 50 mg and hydroxyzine 50 mg. This was ordered.    Patient later admits to feeling better and would like to leave. He missed his psychiatry appointment today and has follow-up in 2 weeks which he plans on following through. He does not want any further services. He has meds to take at home. He can be discharged. I do not find him holdable.    I have reviewed the nursing notes. I have reviewed the findings, diagnosis, plan and need for follow up with the patient.    New Prescriptions    No medications on file       Final diagnoses:   Schizoaffective disorder, bipolar type (H)   Auditory hallucination       Marco Dean MD  [facility] EMERGENCY DEPARTMENT  8/21/2024     Marco Dean MD  08/21/24 3095

## 2024-08-21 NOTE — ED TRIAGE NOTES
Pt reports hearing voices for a couple of days, has hx of schizo affective disorder, anxiety and depression. Pt reports taking medications but was unable to cope with voices.      Triage Assessment (Adult)       Row Name 08/21/24 4125          Triage Assessment    Airway WDL WDL        Respiratory WDL    Respiratory WDL WDL        Skin Circulation/Temperature WDL    Skin Circulation/Temperature WDL WDL        Cardiac WDL    Cardiac WDL WDL        Peripheral/Neurovascular WDL    Peripheral Neurovascular WDL WDL        Cognitive/Neuro/Behavioral WDL    Cognitive/Neuro/Behavioral WDL WDL

## 2024-08-22 ENCOUNTER — TELEPHONE (OUTPATIENT)
Dept: BEHAVIORAL HEALTH | Facility: CLINIC | Age: 34
End: 2024-08-22

## 2024-08-22 ENCOUNTER — VIRTUAL VISIT (OUTPATIENT)
Dept: FAMILY MEDICINE | Facility: CLINIC | Age: 34
End: 2024-08-22
Payer: MEDICARE

## 2024-08-22 ENCOUNTER — MYC MEDICAL ADVICE (OUTPATIENT)
Dept: FAMILY MEDICINE | Facility: CLINIC | Age: 34
End: 2024-08-22

## 2024-08-22 DIAGNOSIS — G83.4 CAUDA EQUINA SYNDROME (H): ICD-10-CM

## 2024-08-22 DIAGNOSIS — M54.17 LUMBOSACRAL RADICULOPATHY AT L5: ICD-10-CM

## 2024-08-22 DIAGNOSIS — M54.6 ACUTE MIDLINE THORACIC BACK PAIN: ICD-10-CM

## 2024-08-22 DIAGNOSIS — E11.65 TYPE 2 DIABETES MELLITUS WITH HYPERGLYCEMIA, WITH LONG-TERM CURRENT USE OF INSULIN (H): ICD-10-CM

## 2024-08-22 DIAGNOSIS — F25.9 SCHIZOPHRENIA, SCHIZOAFFECTIVE, CHRONIC WITH ACUTE EXACERBATION (H): ICD-10-CM

## 2024-08-22 DIAGNOSIS — G89.29 CHRONIC BILATERAL LOW BACK PAIN WITH LEFT-SIDED SCIATICA: Primary | ICD-10-CM

## 2024-08-22 DIAGNOSIS — F25.9 SCHIZOAFFECTIVE DISORDER, CHRONIC CONDITION WITH ACUTE EXACERBATION (H): ICD-10-CM

## 2024-08-22 DIAGNOSIS — M54.42 CHRONIC BILATERAL LOW BACK PAIN WITH LEFT-SIDED SCIATICA: Primary | ICD-10-CM

## 2024-08-22 DIAGNOSIS — Z79.4 TYPE 2 DIABETES MELLITUS WITH HYPERGLYCEMIA, WITH LONG-TERM CURRENT USE OF INSULIN (H): ICD-10-CM

## 2024-08-22 PROCEDURE — 99214 OFFICE O/P EST MOD 30 MIN: CPT | Mod: 95 | Performed by: NURSE PRACTITIONER

## 2024-08-22 PROCEDURE — G2211 COMPLEX E/M VISIT ADD ON: HCPCS | Mod: 95 | Performed by: NURSE PRACTITIONER

## 2024-08-22 ASSESSMENT — ENCOUNTER SYMPTOMS: BACK PAIN: 1

## 2024-08-22 NOTE — PROGRESS NOTES
Prakash is a 34 year old who is being evaluated via a billable video visit.    How would you like to obtain your AVS? MyChart  If the video visit is dropped, the invitation should be resent by: Text to cell phone: [phone]  Will anyone else be joining your video visit? No      Assessment & Plan     Chronic bilateral low back pain with left-sided sciatica  Will plan to obtain MRI.  DME order written for lift chair.  Will refer to spine-encouraged to schedule appointment after MRI completed.  Education given regarding warning signs to watch for and when would need to seek immediate medical attention.  - MR Lumbar Spine w/o & w Contrast; Future  - Spine  Referral; Future  - Lift Chair W Seat Lift Mechanism Order  - Miscellaneous DME Order    Acute midline thoracic back pain  Will plan to obtain MRI.  DME order written for lift chair.  Will refer to spine-encouraged to schedule appointment after MRI completed.  Education given regarding warning signs to watch for and when would need to seek immediate medical attention.  - MR Thoracic Spine w/o & w Contrast; Future  - Spine  Referral; Future  - Lift Chair W Seat Lift Mechanism Order  - Miscellaneous DME Order    Lumbosacral radiculopathy at L5  Will plan to obtain MRI.  DME order written for lift chair.  Will refer to spine-encouraged to schedule appointment after MRI completed.  Education given regarding warning signs to watch for and when would need to seek immediate medical attention.  - Lift Chair W Seat Lift Mechanism Order  - Miscellaneous DME Order    Cauda equina syndrome (H)  Will plan to obtain MRI.  DME order written for lift chair.  Will refer to spine-encouraged to schedule appointment after MRI completed.  Education given regarding warning signs to watch for and when would need to seek immediate medical attention.  - Lift Chair W Seat Lift Mechanism Order  - Miscellaneous DME Order    Schizoaffective disorder, chronic condition with  acute exacerbation (H)  Continue to follow closely with psychiatry.  Will send DME order for pillbox.  - Miscellaneous DME Order    Schizophrenia, schizoaffective, chronic with acute exacerbation (H)  Continue to follow closely with psychiatry.  Will send DME order for pillbox.  - Miscellaneous DME Order    The longitudinal plan of care for the diagnosis(es)/condition(s) as documented were addressed during this visit. Due to the added complexity in care, I will continue to support Prakash in the subsequent management and with ongoing continuity of care.       Subjective   Prakash is a 34 year old, presenting for the following health issues:  Recheck Medication and Back Pain  Patient stated needs two prescriptions for medical equipment (like a pill box for each day of the week, sit to stand chair) and also his back has been popping when he bends over. He stated he can physically feel it and he can hear it as well.           8/22/2024    10:49 AM   Additional Questions   Roomed by Deena MUJICA MA   Accompanied by No one         8/22/2024    10:49 AM   Patient Reported Additional Medications   Patient reports taking the following new medications None     Video Start Time: 12:35 PM    Back Pain          Pain History:  When did you first notice your pain? 1-2 weeks ago   Have you seen anyone else for your pain? No  How has your pain affected your ability to work? Not applicable  Where in your body do you have pain? Back Pain  Onset/Duration: 1/2 weeks  Description:   Location of pain: upper back central/middle  Character of pain: cracking/popping  Pain radiation: none  New numbness or weakness in legs, not attributed to pain: no   Intensity: mild  Progression of Symptoms: worsening and intermittent  History:   Specific cause: lifting, turning/bending  Pain interferes with job: No  History of back problems: Yes  Any previous MRI or X-rays: None  Sees a specialist for back pain: No  Alleviating factors:   Improved by: None     Precipitating factors:  Worsened by: Lifting and Bending  Therapies tried and outcome: Nothing      Objective         Is no longer living in group home.  Moved to an apartment in [place]. Has had some ups and downs.  Reports broke hand last night.  Punched a wall due to hearing voices.  Has not had Prolixin injection for the last couple of doses.  Did go to emergency department last night and was given medications.  Did kind of help to quiet voices however the voices and anger were back this morning it.  Had appointment with psychiatry yesterday.  Dose of Abilify was increased.  Plans to start on Abilify injection in the future.    Would like to get prescription for pillbox for each day of the week.  Will need at least 2 pillboxes as takes medications a couple of times per day.    Would like prescription for lift chair.  Has difficulty getting up from a regular chair or couch due to pain in lower back.  Does have history of cauda equina syndrome and lumbar surgery.  Has been having more pain to mid back.  When bends mid back will pop.  If he coughs can feel crackling in mid back.  Lower back feels unstable.  No injury that is aware of but did do a lot of heavy lifting when moved.  No new or increased numbness or tingling.  No bowel or bladder incontinence. Can feel popping up through spine and into mouth. Is not very painful but it shoots down, will last for about 7 sec. This started about 2 weeks ago.     Vitals:  No vitals were obtained today due to virtual visit.    Physical Exam  Constitutional:       Appearance: Normal appearance.   HENT:      Nose: No congestion.   Eyes:      General: Lids are normal.   Pulmonary:      Effort: No tachypnea, bradypnea or respiratory distress.   Skin:     Coloration: Skin is not ashen, cyanotic, jaundiced or pale.   Neurological:      Mental Status: He is alert.   Psychiatric:         Mood and Affect: Mood normal.         Speech: Speech normal.         Behavior: Behavior  normal.              Video-Visit Details    Type of service:  Video Visit   Video End Time:12:53 PM  Originating Location (pt. Location): Home    Distant Location (provider location):  On-site  Platform used for Video Visit: Evens  Signed Electronically by: BROOKLYN Cruz CNP  DME (Durable Medical Equipment) Orders and Documentation  Orders Placed This Encounter   Procedures    Lift Chair W Seat Lift Mechanism Order    Miscellaneous DME Order        The patient was assessed and it was determined the patient is in need of the following listed DME Supplies/Equipment. Please complete supporting documentation below to demonstrate medical necessity.

## 2024-08-22 NOTE — TELEPHONE ENCOUNTER
Spoke with pt who reported not needing further support RE: DEC follow up. Writer informed pt they can call back in the future if needed. JESSY

## 2024-08-27 ENCOUNTER — NURSE TRIAGE (OUTPATIENT)
Dept: FAMILY MEDICINE | Facility: CLINIC | Age: 34
End: 2024-08-27
Payer: MEDICARE

## 2024-08-27 NOTE — TELEPHONE ENCOUNTER
Pt calling stating he has had nausea all day for two days and vomiting only in the morning.     Pt states he is taking his medication as prescribed- does not check blood sugars. RN asked pt to take blood sugar- pt states currently 125. RN advised to check again in the morning if symptoms persist.     Pt states he has not been around anyone sick and he doesn't feel this is the flu or a virus.        RN assisted in scheduling pt with appointment tomorrow with pcp per pt request    Eva Gordon RN    Reason for Disposition   Patient wants to be seen    Additional Information   Negative: Shock suspected (e.g., cold/pale/clammy skin, too weak to stand, low BP, rapid pulse)   Negative: Sounds like a life-threatening emergency to the triager   Negative: Nausea or vomiting and pregnancy < 20 weeks   Negative: Menstrual Period - Missed or Late (i.e., pregnancy suspected)   Negative: Heat exhaustion suspected (i.e., dehydration from heat exposure)   Negative: Anxiety or stress suspected (i.e., nausea with anxiety attacks or stressful situations)   Negative: Traumatic Brain Injury (TBI) suspected   Negative: Vomiting occurs   Negative: Other symptom is present, see that guideline.  (e.g., chest pain, headache, dizziness, abdominal pain, colds, sore throat, etc.).   Negative: Unable to walk, or can only walk with assistance (e.g., requires support)   Negative: Difficulty breathing   Negative: Insulin-dependent diabetes (Type I) and glucose > 400 mg/dL (22 mmol/L)   Negative: Drinking very little and dehydration suspected (e.g., no urine > 12 hours, very dry mouth, very lightheaded)   Negative: Patient sounds very sick or weak to the triager   Negative: Fever > 104 F  (40 C)   Negative: Fever > 101 F  (38.3 C) and over 60 years of age   Negative: Fever > 100.0 F  (37.8 C) and bedridden (e.g., CVA, chronic illness, recovering from surgery)   Negative: Fever > 100.0 F  (37.8 C) and diabetes mellitus or weak immune system  (e.g., HIV positive, cancer chemo, splenectomy, chronic steroids)   Negative: Taking any of the following medications: digoxin (Lanoxin), lithium, theophylline, phenytoin (Dilantin)   Negative: Yellowish color of the skin or white of the eye (i.e., jaundice)   Negative: Fever present > 3 days (72 hours)    Protocols used: Nausea-A-OH

## 2024-08-28 ENCOUNTER — VIRTUAL VISIT (OUTPATIENT)
Dept: FAMILY MEDICINE | Facility: CLINIC | Age: 34
End: 2024-08-28
Payer: MEDICARE

## 2024-08-28 DIAGNOSIS — A08.4 VIRAL GASTROENTERITIS: Primary | ICD-10-CM

## 2024-08-28 DIAGNOSIS — E11.65 TYPE 2 DIABETES MELLITUS WITH HYPERGLYCEMIA, WITH LONG-TERM CURRENT USE OF INSULIN (H): ICD-10-CM

## 2024-08-28 DIAGNOSIS — Z79.4 TYPE 2 DIABETES MELLITUS WITH HYPERGLYCEMIA, WITH LONG-TERM CURRENT USE OF INSULIN (H): ICD-10-CM

## 2024-08-28 PROCEDURE — G2211 COMPLEX E/M VISIT ADD ON: HCPCS | Mod: 95 | Performed by: NURSE PRACTITIONER

## 2024-08-28 PROCEDURE — 99213 OFFICE O/P EST LOW 20 MIN: CPT | Mod: 95 | Performed by: NURSE PRACTITIONER

## 2024-08-28 RX ORDER — ONDANSETRON 4 MG/1
4 TABLET, ORALLY DISINTEGRATING ORAL EVERY 8 HOURS PRN
Qty: 30 TABLET | Refills: 0 | Status: SHIPPED | OUTPATIENT
Start: 2024-08-28

## 2024-08-28 NOTE — PROGRESS NOTES
Prakash is a 34 year old who is being evaluated via a billable video visit.    How would you like to obtain your AVS? MyChart  If the video visit is dropped, the invitation should be resent by: Text to cell phone: [phone]  Will anyone else be joining your video visit? No      Assessment & Plan     Viral gastroenteritis  Push fluids  Clear liquids and gradually increase diet as tolerated  Notify if continues longer that 72 hours  Rest   Good handwashing   Call or return:  Persistent vomiting   Abdominal pain  If symptoms do not resolve in the next 2-3 days  Any new or worsening symptoms    - ondansetron (ZOFRAN ODT) 4 MG ODT tab; Take 1 tablet (4 mg) by mouth every 8 hours as needed for nausea.    Type 2 diabetes mellitus with hyperglycemia, with long-term current use of insulin (H)  Continue Rybelsus 7 mg daily and Jardiance 10 mg daily.  Plan office visit in October for diabetic recheck.    The longitudinal plan of care for the diagnosis(es)/condition(s) as documented were addressed during this visit. Due to the added complexity in care, I will continue to support Prakash in the subsequent management and with ongoing continuity of care.     Subjective   Prakash is a 34 year old, presenting for the following health issues:  Illness      8/28/2024     1:29 PM   Additional Questions   Roomed by Emily JOHNS         8/28/2024     1:29 PM   Patient Reported Additional Medications   Patient reports taking the following new medications None per patient     Video Start Time: 1:40 PM    History of Present Illness       Reason for visit:  N/v  Symptom onset:  3-7 days ago  Symptom intensity:  Moderate  Symptom progression:  Staying the same  Had these symptoms before:  No   He is taking medications regularly.           Objective       Has not been feeling well for the last 2 days. Is nauseated. Will vomit 1-2 times in the AM. Is not able to take in a lot due to nausea. This AM is having loose stools-watery stools. Feels like  anxiety is doing well. Feels like mental health is better than the last time that spoke.. No fevers or chills. No abdomen pain. Usually prepares own foods. Has not eaten out lately. Will wake up and not be able to go back to sleep. Last blood sugar that checked was last night was 125. When does check usually will check and get around 100.  Currently taking Rybelsus 7 mg and Jardiance 10 mg to help control blood sugars is not having any urinary symptoms.  No pain with urination.  No urinary frequency.  Is not sexually active-no concerns for pregnancy.  No increased lower back pain.    Plans to schedule top surgery first and then have hyst.         Vitals:  No vitals were obtained today due to virtual visit.    Physical Exam  Constitutional:       Appearance: Normal appearance.   HENT:      Nose: No congestion.   Eyes:      General: Lids are normal.   Pulmonary:      Effort: No tachypnea, bradypnea or respiratory distress.   Skin:     Coloration: Skin is not ashen, cyanotic, jaundiced or pale.   Neurological:      Mental Status: He is alert.   Psychiatric:         Mood and Affect: Mood normal.         Speech: Speech normal.         Behavior: Behavior normal.              Video-Visit Details    Type of service:  Video Visit   Video End Time:2:00 PM  Originating Location (pt. Location): Home    Distant Location (provider location):  On-site  Platform used for Video Visit: Evens  Signed Electronically by: BROOKLYN Cruz CNP

## 2024-08-28 NOTE — PATIENT INSTRUCTIONS
Continue to push fluids.    If the nausea and vomiting continues for a another 8 to 72 hours would recommend that you be evaluated in person.

## 2024-08-29 ENCOUNTER — VIRTUAL VISIT (OUTPATIENT)
Dept: FAMILY MEDICINE | Facility: OTHER | Age: 34
End: 2024-08-29
Payer: MEDICARE

## 2024-08-29 DIAGNOSIS — F25.9 SCHIZOAFFECTIVE DISORDER, CHRONIC CONDITION WITH ACUTE EXACERBATION (H): ICD-10-CM

## 2024-08-29 DIAGNOSIS — F64.9 GENDER INCONGRUENCE: ICD-10-CM

## 2024-08-29 DIAGNOSIS — A08.4 VIRAL GASTROENTERITIS: Primary | ICD-10-CM

## 2024-08-29 DIAGNOSIS — Z79.4 TYPE 2 DIABETES MELLITUS WITH HYPERGLYCEMIA, WITH LONG-TERM CURRENT USE OF INSULIN (H): ICD-10-CM

## 2024-08-29 DIAGNOSIS — E11.65 TYPE 2 DIABETES MELLITUS WITH HYPERGLYCEMIA, WITH LONG-TERM CURRENT USE OF INSULIN (H): ICD-10-CM

## 2024-08-29 PROCEDURE — 99214 OFFICE O/P EST MOD 30 MIN: CPT | Mod: 95 | Performed by: FAMILY MEDICINE

## 2024-08-29 RX ORDER — PROMETHAZINE HYDROCHLORIDE 25 MG/1
25 TABLET ORAL EVERY 6 HOURS PRN
Qty: 30 TABLET | Refills: 0 | Status: SHIPPED | OUTPATIENT
Start: 2024-08-29 | End: 2024-09-13

## 2024-08-29 NOTE — PROGRESS NOTES
Prakash is a 34 year old who is being evaluated via a billable video visit.    How would you like to obtain your AVS? MyChart  If the video visit is dropped, the invitation should be resent by: Text to cell phone: [phone]  Will anyone else be joining your video visit? No      Assessment & Plan       ICD-10-CM    1. Viral gastroenteritis  A08.4 Enteric Bacteria and Virus Panel by [facility] Stool     promethazine (PHENERGAN) 25 MG tablet      2. Type 2 diabetes mellitus with hyperglycemia, with long-term current use of insulin (H)  E11.65     Z79.4       3. Schizoaffective disorder, chronic condition with acute exacerbation (H)  F25.9       4. 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  Still most consistent with this, but not responded to Zofran as well as I would like.  He does have a few risk factors for atypical pathogens.  Will obtain a stool panel to further evaluate.  Recommended the patient stop his Rybelsus while he is still vomiting.  Will also add promethazine to his current regimen.  Consider trying Reglan, but there are too many drug interactions for this.  Did consider the possibility of Prolixin withdrawal as a contributing factor, but this does not seem to be likely.  Considered Rybelsus as a cause, but patient started this several weeks prior to his symptoms beginning.  Encouraged patient to stay hydrated and work on symptom control.  If not able to stay hydrated, he needs to get IV hydration.  He is to contact us if any worsening.  2 reportedly under adequate control.  Will hold Rybelsus for now.  Considered in determining treatment plans above.  3.  Encouraged patient to follow-up with his psychiatric team to ensure that he is on appropriate treatment.  He has been off Prolixin for a couple of months.  Considered in determining treatment plan above.  4.  Patient appears to be overdue for Pap.  Recommended that he follow-up with his regular provider.      Portions of this note were completed using Dragon  dictation software.  Although reviewed, there may be typographical and other inadvertent errors that remain.                 Patient Instructions   Continue to stay hydrated.  Take small sips of water throughout the day.  Can also use Gatorade or other electrolyte containing solutions.    Okay to continue Zofran for reduction in nausea.  Can add promethazine to this regimen.    Please come in for a stool test to rule out worrisome infectious causes.    I would recommend following up with your regular provider in the next month or 2 to update some of your preventative health needs.    If not improving over the next few days, we can do some additional testing.    If not able to stay hydrated, you need to be seen for IV fluids.    Contact us or return if questions or concerns.     Have a nice day!    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      Has been taking Zofran, not helpful.  Has not had these symptoms before      Pt has been having lots of nausea and vomiting, dizziness.  Also dealing with diarrhea.  Getting watery stools 2 times/day.  He's trying to stay hydrated.  This started about 3-4 days ago.  He visited with his PCP yesterday and was given Zofran.  This hasn't been helpful.      Denies sick contacts.  2 months ago went to [place].  It's possible that he could have consumed water that wasn't treated, but no contacts with similar symptoms.     He started rybelsus about a month ago.  He didn't have any similar symptoms when he took this previously.      No other new medications.      He hasn't been on  his Prolixin for a couple of months after moving.              Objective           Vitals:  No vitals were obtained today due to virtual visit.    Physical Exam   GENERAL: alert and no distress  EYES: Eyes grossly normal to inspection.  No discharge or erythema, or obvious scleral/conjunctival abnormalities.  RESP: No audible wheeze, cough, or visible cyanosis.    SKIN: Visible skin clear. No significant rash, abnormal pigmentation or lesions.  NEURO: Cranial nerves grossly intact.  Mentation and speech appropriate for age.  PSYCH: Appropriate affect, tone, and pace of words    Office Visit on 08/07/2024   Component Date Value Ref Range Status    Ferritin 08/07/2024 79  6 - 409 ng/mL Final    Male Reference Range:  7 Months-10 Years   6-111 ng/mL  10-18 Years          ng/mL  18 Years and up      ng/mL      Female Reference Range:  7 Months-18 Years   8-115 ng/mL  18-51 Years         6-175 ng/mL  51 Years and up      ng/mL        Iron 08/07/2024 67  37 - 157 ug/dL Final    Iron Binding Capacity 08/07/2024 383  240 - 430 ug/dL Final    Iron Sat Index 08/07/2024 17  15 - 46 % Final    TSH 08/07/2024 1.37  0.30 - 4.20 uIU/mL Final    Cholesterol 08/07/2024 107  <200 mg/dL Final    Triglycerides 08/07/2024 165 (H)  <150 mg/dL Final    Direct Measure HDL 08/07/2024 40  >=40 mg/dL Final    LDL Cholesterol Calculated 08/07/2024 34  <=100 mg/dL Final    Non HDL Cholesterol 08/07/2024 67  <130 mg/dL Final    Patient Fasting > 8hrs? 08/07/2024 Yes   Final    Hemoglobin A1C 08/07/2024 6.7 (H)  0.0 - 5.6 % Final    Normal <5.7%   Prediabetes 5.7-6.4%    Diabetes 6.5% or higher     Note: Adopted from ADA consensus guidelines.    Sodium 08/07/2024 142  135 - 145 mmol/L Final    Potassium 08/07/2024 3.9  3.4 - 5.3 mmol/L Final    Carbon Dioxide (CO2) 08/07/2024 22  22 - 29 mmol/L Final    Anion Gap 08/07/2024 13  7 - 15 mmol/L Final    Urea Nitrogen 08/07/2024 9.2  6.0 - 20.0 mg/dL Final    Creatinine 08/07/2024  "0.80  0.51 - 1.17 mg/dL Final    Male and Female  0-2 Months    0.31-0.88 mg/dL  2-12 Months   0.16-0.39 mg/dL  1-2 Years     0.18-0.35 mg/dL  3-4 Years     0.26-0.42 mg/dL  5-6 Years     0.29-0.47 mg/dL  7-8 Years     0.34-0.53 mg/dL  9-10 Years    0.33-0.64 mg/dL  11-12 Years   0.44-0.68 mg/dL  13-14 Years   0.46-0.77 mg/dL    Female  15 Years and older  0.51-0.95 mg/dL    Male  15 Years and older  0.67-1.17 mg/dL        GFR Estimate 08/07/2024 >90  >60 mL/min/1.73m2 Final    The generation of the estimated GFR is currently based on binary male or female sex. If the electronic health record information indicates another gender identity or if Legal Sex is recorded as \"Unknown\", GFR estimates are not automatically calculated, and application of GFR equations or a direct GFR measurement should be considered according to the individual's appropriate clinical context.  eGFR calculated using 2021 CKD-EPI equation.    Calcium 08/07/2024 10.0  8.8 - 10.4 mg/dL Final    Reference intervals for this test were updated on 7/16/2024 to reflect our healthy population more accurately. There may be differences in the flagging of prior results with similar values performed with this method. Those prior results can be interpreted in the context of the updated reference intervals.    Chloride 08/07/2024 107  98 - 107 mmol/L Final    Glucose 08/07/2024 118 (H)  70 - 99 mg/dL Final    Alkaline Phosphatase 08/07/2024 72  40 - 150 U/L Final    Female:    0-15 days     U/L  15d-1 year   122-469 U/L  1-10 years   142-335 U/L  10-13 years  129-417 U/L  13-15 years   U/L  15-17 years   U/L  17-19 years  45-87 U/L  19 years and older   U/L      Male:  0-15 days     U/L  15d-1 year   122-469 U/L  1-10 years   142-335 U/L  10-13 years  129-417 U/L  13-15 years  116-468 U/L  15-17 years   U/L  17-19 years   U/L  19 years and older   U/L      AST 08/07/2024 49 (H)  0 - 45 U/L Final    ALT " 08/07/2024 61  0 - 70 U/L Final    Female   All ages       0-50 U/L     Male   0-20 Years     0-50 U/L  20-Unsp. Years 0-70 U/L        Protein Total 08/07/2024 8.2  6.4 - 8.3 g/dL Final    Albumin 08/07/2024 4.8  3.5 - 5.2 g/dL Final    Bilirubin Total 08/07/2024 0.4  <=1.2 mg/dL Final    Patient Fasting > 8hrs? 08/07/2024 Yes   Final    WBC Count 08/07/2024 11.3 (H)  4.0 - 11.0 10e3/uL Final    RBC Count 08/07/2024 5.55  3.80 - 5.90 10e6/uL Final    Reference Range:                                                     Female 3.80-5.20 10e6/uL                                      Male 4.40-5.90 10e6u/L    Hemoglobin 08/07/2024 15.8  11.7 - 17.7 g/dL Final    Reference Range:                                                     Female 11.7-15.7 g/dL                                      Male 13.3-17.7 g/dL    Hematocrit 08/07/2024 47.4  35.0 - 53.0 % Final    Reference Range:                                                     Female 35.0-47.0 %                                            Male 40.0-54.0 %    MCV 08/07/2024 85  78 - 100 fL Final    MCH 08/07/2024 28.5  26.5 - 33.0 pg Final    MCHC 08/07/2024 33.3  31.5 - 36.5 g/dL Final    RDW 08/07/2024 14.1  10.0 - 15.0 % Final    Platelet Count 08/07/2024 291  150 - 450 10e3/uL Final    % Neutrophils 08/07/2024 73  % Final    % Lymphocytes 08/07/2024 22  % Final    % Monocytes 08/07/2024 5  % Final    % Eosinophils 08/07/2024 1  % Final    % Basophils 08/07/2024 0  % Final    % Immature Granulocytes 08/07/2024 0  % Final    Absolute Neutrophils 08/07/2024 8.2  1.6 - 8.3 10e3/uL Final    Absolute Lymphocytes 08/07/2024 2.4  0.8 - 5.3 10e3/uL Final    Absolute Monocytes 08/07/2024 0.5  0.0 - 1.3 10e3/uL Final    Absolute Eosinophils 08/07/2024 0.1  0.0 - 0.7 10e3/uL Final    Absolute Basophils 08/07/2024 0.0  0.0 - 0.2 10e3/uL Final    Absolute Immature Granulocytes 08/07/2024 0.0  <=0.4 10e3/uL Final         Video-Visit Details    Type of service:  Video Visit   Video  End Time:11:23 AM  Originating Location (pt. Location): Home    Distant Location (provider location):  Off-site  Platform used for Video Visit: Evens  Signed Electronically by: Valentino Godfrey MD, MD

## 2024-08-29 NOTE — PATIENT INSTRUCTIONS
Continue to stay hydrated.  Take small sips of water throughout the day.  Can also use Gatorade or other electrolyte containing solutions.    Okay to continue Zofran for reduction in nausea.  Can add promethazine to this regimen.    Please come in for a stool test to rule out worrisome infectious causes.    I would recommend following up with your regular provider in the next month or 2 to update some of your preventative health needs.    If not improving over the next few days, we can do some additional testing.    If not able to stay hydrated, you need to be seen for IV fluids.    Contact us or return if questions or concerns.     Have a nice day!    Dr. Godfrey

## 2024-09-03 ENCOUNTER — VIRTUAL VISIT (OUTPATIENT)
Dept: FAMILY MEDICINE | Facility: CLINIC | Age: 34
End: 2024-09-03
Payer: MEDICARE

## 2024-09-03 DIAGNOSIS — R19.7 VOMITING AND DIARRHEA: Primary | ICD-10-CM

## 2024-09-03 DIAGNOSIS — R11.10 VOMITING AND DIARRHEA: Primary | ICD-10-CM

## 2024-09-03 PROCEDURE — 99213 OFFICE O/P EST LOW 20 MIN: CPT | Mod: 95 | Performed by: PHYSICIAN ASSISTANT

## 2024-09-03 ASSESSMENT — ENCOUNTER SYMPTOMS
DIARRHEA: 1
NAUSEA: 1
VOMITING: 1

## 2024-09-03 NOTE — PROGRESS NOTES
"Prakash is a 34 year old who is being evaluated via a billable video visit.    How would you like to obtain your AVS? MyChart  If the video visit is dropped, the invitation should be resent by: Text to cell phone: [phone]  Will anyone else be joining your video visit? No      Assessment & Plan     Vomiting and diarrhea  X 1 week, still vomiting 2-3 times daily and having diarrhea 2-3 times daily.   Recommend he is seen for an office visit or at urgent care for full evaluation given the duration of symptoms        BMI  Estimated body mass index is 36.49 kg/m  as calculated from the following:    Height as of 4/23/24: 1.676 m (5' 6\").    Weight as of 8/7/24: 102.6 kg (226 lb 1.6 oz).             Subjective   Prakash is a 34 year old, presenting for the following health issues:  Nausea (/For 1 week), Vomiting (For 1 week, 2 episodes daily), and Diarrhea (For 1 week, 2-3 episodes daily)      Video Start Time: 1:35 PM    Nausea  Associated symptoms include nausea and vomiting.   Vomiting   Associated symptoms include diarrhea.   Diarrhea  Associated symptoms include nausea and vomiting.   History of Present Illness       Reason for visit:  N/v  Symptom onset:  3-7 days ago  Symptom intensity:  Moderate  Symptom progression:  Staying the same  Had these symptoms before:  No   He is taking medications regularly.     Nausea, vomiting, diarrhea x 1 week,  he is vomiting 2-3 times per day.  Has diarrhea 2-3 times a day too.  Denies abdominal pain but does have abdominal cramping prior to vomiting.  He has been seen twice by virtual visit and given zofran and promethazine which have helped slightly                Objective           Vitals:  No vitals were obtained today due to virtual visit.    Physical Exam   GENERAL: alert and no distress  EYES: Eyes grossly normal to inspection.  No discharge or erythema, or obvious scleral/conjunctival abnormalities.  RESP: No audible wheeze, cough, or visible cyanosis.    SKIN: " Visible skin clear. No significant rash, abnormal pigmentation or lesions.  NEURO: Cranial nerves grossly intact.  Mentation and speech appropriate for age.  PSYCH: Appropriate affect, tone, and pace of words          Video-Visit Details    Type of service:  Video Visit   Video End Time:1:41 PM  Originating Location (pt. Location): Home    Distant Location (provider location):  On-site  Platform used for Video Visit: Evens  Signed Electronically by: Sylvia Lima PA-C

## 2024-09-04 DIAGNOSIS — M54.50 LUMBAR PAIN: Primary | ICD-10-CM

## 2024-09-04 NOTE — PROGRESS NOTES
BG at 2 am was 255   Called the patient and they called her and got her in today.  They said they think she has a fungal infection  hector

## 2024-09-05 ENCOUNTER — TELEPHONE (OUTPATIENT)
Dept: PLASTIC SURGERY | Facility: CLINIC | Age: 34
End: 2024-09-05
Payer: MEDICARE

## 2024-09-05 NOTE — TELEPHONE ENCOUNTER
Called Prakash to inquire if he had a letter of support yet from his therapist at [facility]. Left  with callback number.     clear

## 2024-09-09 ENCOUNTER — MYC REFILL (OUTPATIENT)
Dept: FAMILY MEDICINE | Facility: CLINIC | Age: 34
End: 2024-09-09
Payer: MEDICARE

## 2024-09-09 DIAGNOSIS — M54.42 CHRONIC BILATERAL LOW BACK PAIN WITH LEFT-SIDED SCIATICA: ICD-10-CM

## 2024-09-09 DIAGNOSIS — G89.29 CHRONIC BILATERAL LOW BACK PAIN WITH LEFT-SIDED SCIATICA: ICD-10-CM

## 2024-09-10 RX ORDER — GABAPENTIN 600 MG/1
TABLET ORAL
Qty: 150 TABLET | Refills: 3 | Status: ON HOLD | OUTPATIENT
Start: 2024-09-10 | End: 2024-09-27

## 2024-09-10 RX ORDER — GABAPENTIN 600 MG/1
TABLET ORAL
Qty: 150 TABLET | Refills: 0 | Status: SHIPPED | OUTPATIENT
Start: 2024-09-10 | End: 2024-09-10

## 2024-09-13 ENCOUNTER — HOSPITAL ENCOUNTER (INPATIENT)
Facility: CLINIC | Age: 34
LOS: 7 days | Discharge: OTHER INSTITUTION | DRG: 885 | End: 2024-09-23
Attending: PSYCHIATRY & NEUROLOGY | Admitting: PSYCHIATRY & NEUROLOGY
Payer: MEDICARE

## 2024-09-13 ENCOUNTER — TELEPHONE (OUTPATIENT)
Dept: BEHAVIORAL HEALTH | Facility: CLINIC | Age: 34
End: 2024-09-13
Payer: MEDICARE

## 2024-09-13 DIAGNOSIS — S51.832A PUNCTURE WOUND OF LEFT FOREARM, INITIAL ENCOUNTER: Primary | ICD-10-CM

## 2024-09-13 DIAGNOSIS — F25.0 SCHIZOAFFECTIVE DISORDER, BIPOLAR TYPE (H): ICD-10-CM

## 2024-09-13 DIAGNOSIS — R45.851 SUICIDAL IDEATION: ICD-10-CM

## 2024-09-13 DIAGNOSIS — Z72.89 SELF-INJURIOUS BEHAVIOR: ICD-10-CM

## 2024-09-13 PROCEDURE — 99285 EMERGENCY DEPT VISIT HI MDM: CPT | Mod: 25 | Performed by: PSYCHIATRY & NEUROLOGY

## 2024-09-13 PROCEDURE — 250N000009 HC RX 250: Performed by: EMERGENCY MEDICINE

## 2024-09-13 PROCEDURE — 250N000013 HC RX MED GY IP 250 OP 250 PS 637: Performed by: FAMILY MEDICINE

## 2024-09-13 PROCEDURE — 93010 ELECTROCARDIOGRAM REPORT: CPT | Mod: 59 | Performed by: PSYCHIATRY & NEUROLOGY

## 2024-09-13 PROCEDURE — 90714 TD VACC NO PRESV 7 YRS+ IM: CPT | Performed by: PSYCHIATRY & NEUROLOGY

## 2024-09-13 PROCEDURE — 96372 THER/PROPH/DIAG INJ SC/IM: CPT | Performed by: PSYCHIATRY & NEUROLOGY

## 2024-09-13 PROCEDURE — 12001 RPR S/N/AX/GEN/TRNK 2.5CM/<: CPT | Performed by: PSYCHIATRY & NEUROLOGY

## 2024-09-13 PROCEDURE — 90471 IMMUNIZATION ADMIN: CPT | Performed by: PSYCHIATRY & NEUROLOGY

## 2024-09-13 PROCEDURE — 250N000013 HC RX MED GY IP 250 OP 250 PS 637: Performed by: PSYCHIATRY & NEUROLOGY

## 2024-09-13 PROCEDURE — 250N000011 HC RX IP 250 OP 636: Performed by: PSYCHIATRY & NEUROLOGY

## 2024-09-13 PROCEDURE — 12001 RPR S/N/AX/GEN/TRNK 2.5CM/<: CPT | Mod: LT | Performed by: EMERGENCY MEDICINE

## 2024-09-13 PROCEDURE — 93005 ELECTROCARDIOGRAM TRACING: CPT | Performed by: PSYCHIATRY & NEUROLOGY

## 2024-09-13 RX ORDER — PAROXETINE 10 MG/1
10 TABLET, FILM COATED ORAL DAILY
Status: DISCONTINUED | OUTPATIENT
Start: 2024-09-13 | End: 2024-09-17

## 2024-09-13 RX ORDER — LIDOCAINE HYDROCHLORIDE AND EPINEPHRINE 10; 10 MG/ML; UG/ML
1 INJECTION, SOLUTION INFILTRATION; PERINEURAL ONCE
Status: COMPLETED | OUTPATIENT
Start: 2024-09-13 | End: 2024-09-13

## 2024-09-13 RX ORDER — AMLODIPINE BESYLATE 5 MG/1
10 TABLET ORAL DAILY
Status: DISCONTINUED | OUTPATIENT
Start: 2024-09-13 | End: 2024-09-17

## 2024-09-13 RX ORDER — VALACYCLOVIR HYDROCHLORIDE 1 G/1
1000 TABLET, FILM COATED ORAL EVERY 12 HOURS SCHEDULED
Status: DISCONTINUED | OUTPATIENT
Start: 2024-09-13 | End: 2024-09-14

## 2024-09-13 RX ORDER — ARIPIPRAZOLE 5 MG/1
5 TABLET ORAL DAILY
Status: DISCONTINUED | OUTPATIENT
Start: 2024-09-13 | End: 2024-09-14

## 2024-09-13 RX ORDER — QUETIAPINE FUMARATE 100 MG/1
100 TABLET, FILM COATED ORAL ONCE
Status: COMPLETED | OUTPATIENT
Start: 2024-09-13 | End: 2024-09-13

## 2024-09-13 RX ORDER — ACETAMINOPHEN 325 MG/1
650 TABLET ORAL ONCE
Status: COMPLETED | OUTPATIENT
Start: 2024-09-13 | End: 2024-09-13

## 2024-09-13 RX ORDER — ROSUVASTATIN CALCIUM 20 MG/1
40 TABLET, COATED ORAL DAILY
Status: DISCONTINUED | OUTPATIENT
Start: 2024-09-13 | End: 2024-09-17

## 2024-09-13 RX ORDER — GABAPENTIN 600 MG/1
1200 TABLET ORAL 2 TIMES DAILY
Status: DISCONTINUED | OUTPATIENT
Start: 2024-09-13 | End: 2024-09-17

## 2024-09-13 RX ORDER — CLONAZEPAM 0.5 MG/1
0.25 TABLET ORAL 2 TIMES DAILY
Status: DISCONTINUED | OUTPATIENT
Start: 2024-09-13 | End: 2024-09-17

## 2024-09-13 RX ORDER — LITHIUM CARBONATE 450 MG
900 TABLET, EXTENDED RELEASE ORAL AT BEDTIME
Status: DISCONTINUED | OUTPATIENT
Start: 2024-09-13 | End: 2024-09-17

## 2024-09-13 RX ORDER — OLANZAPINE 10 MG/2ML
10 INJECTION, POWDER, FOR SOLUTION INTRAMUSCULAR ONCE
Status: COMPLETED | OUTPATIENT
Start: 2024-09-13 | End: 2024-09-13

## 2024-09-13 RX ORDER — FLUPHENAZINE HYDROCHLORIDE 1 MG/1
2 TABLET ORAL 2 TIMES DAILY
Status: DISCONTINUED | OUTPATIENT
Start: 2024-09-13 | End: 2024-09-14

## 2024-09-13 RX ORDER — GABAPENTIN 600 MG/1
600 TABLET ORAL
Status: DISCONTINUED | OUTPATIENT
Start: 2024-09-13 | End: 2024-09-17

## 2024-09-13 RX ORDER — OLANZAPINE 5 MG/1
5 TABLET, ORALLY DISINTEGRATING ORAL ONCE
Status: COMPLETED | OUTPATIENT
Start: 2024-09-13 | End: 2024-09-13

## 2024-09-13 RX ORDER — HYDROXYZINE HYDROCHLORIDE 25 MG/1
50 TABLET, FILM COATED ORAL ONCE
Status: COMPLETED | OUTPATIENT
Start: 2024-09-13 | End: 2024-09-13

## 2024-09-13 RX ADMIN — FLUPHENAZINE HYDROCHLORIDE 2 MG: 1 TABLET, FILM COATED ORAL at 21:57

## 2024-09-13 RX ADMIN — ACETAMINOPHEN 650 MG: 325 TABLET ORAL at 21:58

## 2024-09-13 RX ADMIN — ACETAMINOPHEN 650 MG: 325 TABLET ORAL at 17:00

## 2024-09-13 RX ADMIN — LIDOCAINE HYDROCHLORIDE AND EPINEPHRINE 1 ML: 10; 10 INJECTION, SOLUTION INFILTRATION; PERINEURAL at 16:49

## 2024-09-13 RX ADMIN — NICOTINE POLACRILEX 2 MG: 2 GUM, CHEWING ORAL at 22:01

## 2024-09-13 RX ADMIN — GABAPENTIN 1200 MG: 600 TABLET, FILM COATED ORAL at 22:02

## 2024-09-13 RX ADMIN — HYDROXYZINE HYDROCHLORIDE 50 MG: 25 TABLET, FILM COATED ORAL at 15:54

## 2024-09-13 RX ADMIN — CLONAZEPAM 0.25 MG: 0.5 TABLET ORAL at 22:01

## 2024-09-13 RX ADMIN — OLANZAPINE 5 MG: 5 TABLET, ORALLY DISINTEGRATING ORAL at 23:11

## 2024-09-13 RX ADMIN — OLANZAPINE 10 MG: 10 INJECTION, POWDER, FOR SOLUTION INTRAMUSCULAR at 18:25

## 2024-09-13 RX ADMIN — CLOSTRIDIUM TETANI TOXOID ANTIGEN (FORMALDEHYDE INACTIVATED) AND CORYNEBACTERIUM DIPHTHERIAE TOXOID ANTIGEN (FORMALDEHYDE INACTIVATED) 0.5 ML: 5; 2 INJECTION, SUSPENSION INTRAMUSCULAR at 15:55

## 2024-09-13 RX ADMIN — GABAPENTIN 600 MG: 600 TABLET, FILM COATED ORAL at 18:24

## 2024-09-13 RX ADMIN — QUETIAPINE FUMARATE 100 MG: 100 TABLET ORAL at 15:54

## 2024-09-13 RX ADMIN — QUETIAPINE FUMARATE 250 MG: 200 TABLET ORAL at 21:59

## 2024-09-13 RX ADMIN — LITHIUM CARBONATE 900 MG: 450 TABLET, EXTENDED RELEASE ORAL at 22:01

## 2024-09-13 ASSESSMENT — COLUMBIA-SUICIDE SEVERITY RATING SCALE - C-SSRS
2. HAVE YOU ACTUALLY HAD ANY THOUGHTS OF KILLING YOURSELF IN THE PAST MONTH?: YES
4. HAVE YOU HAD THESE THOUGHTS AND HAD SOME INTENTION OF ACTING ON THEM?: NO
3. HAVE YOU BEEN THINKING ABOUT HOW YOU MIGHT KILL YOURSELF?: YES
5. HAVE YOU STARTED TO WORK OUT OR WORKED OUT THE DETAILS OF HOW TO KILL YOURSELF? DO YOU INTEND TO CARRY OUT THIS PLAN?: YES
6. HAVE YOU EVER DONE ANYTHING, STARTED TO DO ANYTHING, OR PREPARED TO DO ANYTHING TO END YOUR LIFE?: YES
1. IN THE PAST MONTH, HAVE YOU WISHED YOU WERE DEAD OR WISHED YOU COULD GO TO SLEEP AND NOT WAKE UP?: YES

## 2024-09-13 ASSESSMENT — ACTIVITIES OF DAILY LIVING (ADL)
ADLS_ACUITY_SCORE: 39

## 2024-09-13 NOTE — MEDICATION SCRIBE - ADMISSION MEDICATION HISTORY
Medication Scribe Admission Medication History    Admission medication history is complete. The information provided in this note is only as accurate as the sources available at the time of the update.    Information Source(s): Patient and CareEverywhere/SureScripts via in-person    Pertinent Information: N/A    Changes made to PTA medication list:  Added: None  Deleted: prolixin 1mg, prolixin 5mg, prolixin IM, austedo 6mg, promethazine 25mg  Changed: None    Allergies reviewed with patient and updates made in EHR: yes    Medication History Completed By: Radha Wei 9/13/2024 5:51 PM    PTA Med List   Medication Sig Last Dose    amLODIPine (NORVASC) 10 MG tablet Take 1 tablet (10 mg) by mouth daily 9/13/2024 at am    ARIPiprazole (ABILIFY) 5 MG tablet Take 1 tablet (5 mg) by mouth every morning (Patient taking differently: Take 10 mg by mouth every morning.) 9/13/2024 at am    clonazePAM (KLONOPIN) 0.5 MG tablet Take 0.5 tablets (0.25 mg) by mouth 2 times daily (Patient taking differently: Take 0.5 mg by mouth 2 times daily.) 9/13/2024 at am    empagliflozin (JARDIANCE) 10 MG TABS tablet Take 1 tablet (10 mg) by mouth daily 9/13/2024 at am    gabapentin (NEURONTIN) 600 MG tablet Take 2 tablets by mouth in the morning, 1 tablet in the afternoon, and 2 tablets in the evening (total 5 tablets daily). (Patient taking differently: Take 600-1,200 mg by mouth 3 times daily. Take 1200 mg by mouth in the morning, 600mg in the afternoon, and 1200mg in the evening (total 5 tablets daily).) 9/13/2024 at noon    lithium (ESKALITH CR/LITHOBID) 450 MG CR tablet Take 2 tablets (900 mg) by mouth at bedtime 9/12/2024 at pm    ondansetron (ZOFRAN ODT) 4 MG ODT tab Take 1 tablet (4 mg) by mouth every 8 hours as needed for nausea. 9/13/2024 at am    PARoxetine (PAXIL) 10 MG tablet Take 1 tablet (10 mg) by mouth daily 9/13/2024 at am    QUEtiapine (SEROQUEL) 200 MG tablet Take 1 tablet (200 mg) by mouth at bedtime (Patient taking  differently: Take 200 mg by mouth at bedtime. With 50mg dose for a total of 250mg at bedtime) 9/12/2024 at pm    QUEtiapine (SEROQUEL) 50 MG tablet Take 1 tablet (50 mg) by mouth at bedtime (Patient taking differently: Take 50 mg by mouth at bedtime. With 200mg dose for a total of 250mg at bedtime) 9/12/2024 at pm    rosuvastatin (CRESTOR) 40 MG tablet Take 1 tablet (40 mg) by mouth daily 9/13/2024 at am    Semaglutide (RYBELSUS) 7 MG tablet Take 1 tablet (7 mg) by mouth daily. Past Week    testosterone (ANDROGEL/TESTIM) 50 MG/5GM (1%) topical gel Place 2 packets (100 mg of testosterone) onto the skin daily 9/13/2024 at am    valACYclovir (VALTREX) 1000 mg tablet Take 2 tablets (2,000 mg) by mouth 2 times daily (Patient taking differently: Take 2,000 mg by mouth 2 times daily as needed.) More than a month

## 2024-09-13 NOTE — ED PROVIDER NOTES
ED Provider Note  [facility]      History     Chief Complaint   Patient presents with    Suicidal     Called and BIBA. SI 2 wks. Steak knife puncture x2 in left arm. Denies it as an attempt--endorses he did this as relief from suicidal ideations. Plan for suicide with intent, gabapentin OD.    Puncture Wound     Denies it as an attempt--endorses he did this as relief from suicidal ideations.      HPI  Prakash Prasad is a 34 year old adult with a history of ADHD, bipolar 1 disorder, polysubstance abuse, PTSD, borderline personality disorder, TBI who presents to the emergency department via EMS for suicidal ideation and a puncture wound to his left arm.  Patient called EMS and reported suicidal ideation for the past 2 weeks.  He notes he did puncture his left arm twice with a steak knife, but denied it as a suicide attempt.  He feels he needs an updated tetanus and will need sutures as the site continues to bleed.    Patient reports ongoing thoughts of suicide and cannot commit to safety.    Past Medical History  Past Medical History:   Diagnosis Date    ADHD (attention deficit hyperactivity disorder)     Bipolar 1 disorder     Borderline personality disorder     Cauda equina syndrome     Chronic low back pain     Depression     Diabetes mellitus, type 2 (H) 1/19/2023    GERD (gastroesophageal reflux disease)     h/o TBI (traumatic brain injury)     Hypertension, unspecified type 12/16/2021    Marginal corneal ulcer, left 07/17/2015    Nephrolithiasis     obesity     Polysubstance abuse - methamphetamine, hallucinagen, heroin, marijuana     currently in remission    PONV (postoperative nausea and vomiting)     PTSD (post-traumatic stress disorder)      Past Surgical History:   Procedure Laterality Date    BACK SURGERY  12/24/2016    BACK SURGERY - For Cauda Equina Syndrome  12/24/2016    COLONOSCOPY      COMBINED CYSTOSCOPY, INSERT STENT URETER(S) Left 8/30/2018    Procedure: COMBINED  CYSTOSCOPY, INSERT STENT URETER(S);  Cystoscopy With Left Stent Placement;  Surgeon: Kiran Ulrich MD;  Location: [facility] OR    COMBINED CYSTOSCOPY, RETROGRADES, EXCHANGE STENT URETER(S) Left 10/14/2018    Procedure: COMBINED CYSTOSCOPY, RETROGRADES, EXCHANGE STENT URETER(S);  Cystoscopy and removal of left-sided stent.;  Surgeon: Stiven Olivo MD;  Location:  OR    COMBINED CYSTOSCOPY, RETROGRADES, URETEROSCOPY, INSERT STENT  1/6/2014    Procedure: COMBINED CYSTOSCOPY, RETROGRADES, URETEROSCOPY, INSERT STENT;  Cystyoscopy place left ureteral stent;  Surgeon: Jaun Kimble MD;  Location: [facility] OR    COMBINED CYSTOSCOPY, RETROGRADES, URETEROSCOPY, INSERT STENT Left 10/23/2018    Procedure: Video cystoscopy, left ureteroscopy with stone extraction;  Surgeon: Bull Mast MD;  Location:  OR    CYSTOSCOPY, URETEROSCOPY, COMBINED Right 9/23/2015    Procedure: COMBINED CYSTOSCOPY, URETEROSCOPY;  Surgeon: ROME Jett MD;  Location: [facility] OR    ENT SURGERY      ESOPHAGOSCOPY, GASTROSCOPY, DUODENOSCOPY (EGD), COMBINED  4/8/2013    Procedure: COMBINED ESOPHAGOSCOPY, GASTROSCOPY, DUODENOSCOPY (EGD), BIOPSY SINGLE OR MULTIPLE;  Gastroscopy;  Surgeon: Peter Pickard MD;  Location: [facility] GI    ESOPHAGOSCOPY, GASTROSCOPY, DUODENOSCOPY (EGD), COMBINED Left 10/28/2014    Procedure: COMBINED ESOPHAGOSCOPY, GASTROSCOPY, DUODENOSCOPY (EGD), BIOPSY SINGLE OR MULTIPLE;  Surgeon: Narcisa Ramirez MD;  Location:  OR    ESOPHAGOSCOPY, GASTROSCOPY, DUODENOSCOPY (EGD), COMBINED N/A 12/24/2018    Procedure: COMBINED ESOPHAGOSCOPY, GASTROSCOPY, DUODENOSCOPY (EGD), BIOPSY SINGLE OR MULTIPLE;  Surgeon: Sonu Verduzco MD;  Location: [facility] GI    INJECT EPIDURAL TRANSFORAMINAL LUMBAR / SACRAL EA ADDITIONAL LEVEL Left 3/18/2021    Procedure: Left L5/S1 transforaminal injection for selective L5 nerve root block;  Surgeon: Eliza Pagan MD;  Location: [facility]    LAPAROSCOPIC CHOLECYSTECTOMY  11/20/2014    [facility], Dr. Ramirez    LASER HOLMIUM LITHOTRIPSY URETER(S), INSERT STENT, COMBINED  4/2/2014    Procedure: COMBINED CYSTOSCOPY, URETEROSCOPY, LASER HOLMIUM LITHOTRIPSY URETER(S), INSERT STENT;  Cystoscopy,Left Ureteral Stent Removal,Left Ureteroscopy with Laser Lithotripsy,Left Ureter Stent Placement;  Surgeon: ROME Jett MD;  Location: [facility] OR    Transurethral stone resection  03/11/2011     ACE/ARB/ARNI NOT PRESCRIBED (INTENTIONAL)  amLODIPine (NORVASC) 10 MG tablet  ARIPiprazole (ABILIFY) 5 MG tablet  blood glucose (NO BRAND SPECIFIED) test strip  clonazePAM (KLONOPIN) 0.5 MG tablet  deutetrabenazine (AUSTEDO) 6 MG tablet  empagliflozin (JARDIANCE) 10 MG TABS tablet  fluPHENAZine (PROLIXIN) 1 MG tablet  fluPHENAZine (PROLIXIN) 5 MG tablet  fluPHENAZine decanoate (PROLIXIN) 25 MG/ML injection  gabapentin (NEURONTIN) 600 MG tablet  insulin pen needle (32G X 4 MM) 32G X 4 MM miscellaneous  lithium (ESKALITH CR/LITHOBID) 450 MG CR tablet  ondansetron (ZOFRAN ODT) 4 MG ODT tab  PARoxetine (PAXIL) 10 MG tablet  promethazine (PHENERGAN) 25 MG tablet  QUEtiapine (SEROQUEL) 200 MG tablet  QUEtiapine (SEROQUEL) 50 MG tablet  rosuvastatin (CRESTOR) 40 MG tablet  Semaglutide (RYBELSUS) 7 MG tablet  testosterone (ANDROGEL/TESTIM) 50 MG/5GM (1%) topical gel  thin (NO BRAND SPECIFIED) lancets  valACYclovir (VALTREX) 1000 mg tablet      Allergies   Allergen Reactions    Haldol [Haloperidol] Other (See Comments)     Makes patient very angry and anxious    Adhesive Tape Hives    Prednisone Other (See Comments) and Hives     Suicidal ideation    Droperidol Anxiety     Family History  Family History   Problem Relation Age of Onset    Hyperlipidemia Mother     Mental Illness Mother     Anxiety Disorder Mother     Anesthesia Reaction Mother         Vomiting/Nausea    Other Cancer Mother     Skin Cancer Mother     Gastrointestinal Disease Father         Crohn's Disease    Cancer Father         Liver Cancer    Other Cancer  "Father         Liver    Obesity Father     Skin Cancer Father     No Known Problems Sister     Hypertension Brother     Other Cancer Brother         Esophagecial    Diabetes Brother     Obesity Brother     Hypertension Brother     Other Cancer Brother     Cancer Maternal Grandmother         lympoma    Diabetes Maternal Grandmother     Mental Illness Maternal Grandmother     Other Cancer Maternal Grandmother         Non Hodgkins Lymphoma    Diabetes Maternal Grandfather     Hypertension Maternal Grandfather     Prostate Cancer Maternal Grandfather     Hyperlipidemia Maternal Grandfather     Heart Disease Paternal Grandfather         heart disease    Other Cancer Paternal Half-Brother      Social History   Social History     Tobacco Use    Smoking status: Former     Current packs/day: 0.00     Average packs/day: 0.5 packs/day for 11.1 years (5.6 ttl pk-yrs)     Types: Other, Cigarettes     Start date: 2011     Quit date: 2022     Years since quittin.0     Passive exposure: Never    Smokeless tobacco: Former     Types: Chew     Quit date: 2019    Tobacco comments:     Just nicotine gum currently. 23   Vaping Use    Vaping status: Every Day    Substances: Nicotine, THC, Flavoring    Devices: Disposable   Substance Use Topics    Alcohol use: Yes    Drug use: Not Currently     Types: Marijuana, Opiates     Comment: denies using oxy or other opiates_\"not for years\"      A medically appropriate review of systems was performed with pertinent positives and negatives noted in the HPI, and all other systems negative.    Physical Exam   BP: 130/85  Pulse: 92  Temp: 98.6  F (37  C)  Resp: 22  Height: 167.6 cm (5' 6\")  SpO2: 96 %  Physical Exam  Vitals and nursing note reviewed.   HENT:      Head: Normocephalic.   Eyes:      Pupils: Pupils are equal, round, and reactive to light.   Pulmonary:      Effort: Pulmonary effort is normal.   Musculoskeletal:         General: Normal range of motion.      Cervical " back: Normal range of motion.   Neurological:      General: No focal deficit present.      Mental Status: He is alert.   Psychiatric:         Attention and Perception: Attention and perception normal. He does not perceive auditory or visual hallucinations.         Mood and Affect: Mood is anxious.         Speech: Speech normal.         Behavior: Behavior normal. Behavior is not agitated, aggressive, hyperactive or combative. Behavior is cooperative.         Thought Content: Thought content includes suicidal ideation. Thought content includes suicidal plan.         Cognition and Memory: Cognition and memory normal.         Judgment: Judgment is impulsive.           ED Course, Procedures, & Data      Procedures       A consult was attained from the  DEC service. The case was discussed with the  from that service. The consulting service's recommendations were provided at 16:45. 10 minutes spent discussing case, care and disposition. 10 minutes spent reviewing prior records and intervention.     Results for orders placed or performed during the hospital encounter of 09/13/24   EKG 12 lead     Status: None (Preliminary result)   Result Value Ref Range    Systolic Blood Pressure  mmHg    Diastolic Blood Pressure  mmHg    Ventricular Rate 89 BPM    Atrial Rate 89 BPM    NY Interval 172 ms    QRS Duration 96 ms     ms    QTc 452 ms    P Axis 25 degrees    R AXIS 7 degrees    T Axis 17 degrees    Interpretation ECG       Sinus rhythm with Premature ventricular complexes or Fusion complexes  Otherwise normal ECG       Medications   nicotine (NICORETTE) gum 2 mg (has no administration in time range)   acetaminophen (TYLENOL) tablet 650 mg (has no administration in time range)   Td (tetanus & diphtheria toxoids) -  adult formulation - for ages 7 years and older (0.5 mLs Intramuscular $Given 9/13/24 8591)   hydrOXYzine HCl (ATARAX) tablet 50 mg (50 mg Oral $Given 9/13/24 7365)   QUEtiapine (SEROquel) tablet 100  mg (100 mg Oral $Given 9/13/24 6157)   lidocaine 1% with EPINEPHrine 1:100,000 injection 1 mL (1 mL Intradermal $Given by Other Clinician 9/13/24 9135)     Labs Ordered and Resulted from Time of ED Arrival to Time of ED Departure - No data to display  No orders to display             EKG Interpretation:      Interpreted by Marco Dean MD  Time reviewed:15:15   Symptoms at time of EKG: None   Rhythm: normal sinus   Rate: normal  Axis: NORMAL  Ectopy: none  Conduction: normal.   ST Segments/ T Waves: No ST-T wave changes  Q Waves: none  Comparison to prior: Improved in QTc value    Clinical Impression: normal EKG     Critical care was not performed.     Medical Decision Making  The patient's presentation was of high complexity (an acute health issue posing potential threat to life or bodily function).    The patient's evaluation involved:  an assessment requiring an independent historian (see separate area of note for details)  review of external note(s) from 3+ sources (see separate area of note for details)  review of 2 test result(s) ordered prior to this encounter (see separate area of note for details)  ordering and/or review of 2 test(s) in this encounter (see separate area of note for details)    The patient's management necessitated moderate risk (limitations due to social determinants of health (social isolation)) and high risk (a decision regarding hospitalization).    Assessment & Plan    Patient is here with concerns for feeling suicidal and impulsive. He had used a steak knife and had punctured his forearm twice. He needs the laceration sutured. He denied it was with suicide intent but admits to actively feeling acutely suicidal and cannot commit to safety. He aquino snot feel safe without a sitter. Due to patient's acute safety concern, he is referred for admission. He is voluntary.    I have reviewed the nursing notes. I have reviewed the findings, diagnosis, plan and need for follow up with  the patient.    New Prescriptions    No medications on file       Final diagnoses:   Schizoaffective disorder, bipolar type (H)   Suicidal ideation   Self-injurious behavior         [facility] EMERGENCY DEPARTMENT  9/13/2024     Marco Dean MD  09/13/24 5611
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
## 2024-09-13 NOTE — ED PROVIDER NOTES
ED Course, Procedures, & Data      [facility]    -Laceration Repair    Date/Time: 9/13/2024 6:57 PM    Performed by: Kenneth Christianson DO  Authorized by: Kenneth Christianson DO    Risks, benefits and alternatives discussed.      ANESTHESIA (see MAR for exact dosages):     Anesthesia method:  Local infiltration    Local anesthetic:  Lidocaine 1% WITH epi  LACERATION DETAILS     Location:  Shoulder/arm    Shoulder/arm location:  L lower arm    Length (cm):  1    REPAIR TYPE:     Repair type:  Simple    EXPLORATION:     Hemostasis achieved with:  Epinephrine and direct pressure    Contaminated: no      TREATMENT:     Area cleansed with:  Shur-Clens    Amount of cleaning:  Standard    SKIN REPAIR     Repair method:  Sutures    Suture size:  3-0    Suture material:  Nylon    Suture technique:  Simple interrupted    Number of sutures:  1    PROCEDURE    Patient Tolerance:  Patient tolerated the procedure well with no immediate complications  [facility]    -Laceration Repair    Date/Time: 9/13/2024 6:58 PM    Performed by: Kenneth Christianson DO  Authorized by: Kenneth Christianson DO    Risks, benefits and alternatives discussed.      ANESTHESIA (see MAR for exact dosages):     Anesthesia method:  None  LACERATION DETAILS     Location:  Shoulder/arm    Shoulder/arm location:  L lower arm    Length (cm):  1    REPAIR TYPE:     Repair type:  Simple    EXPLORATION:     Hemostasis achieved with:  Epinephrine    TREATMENT:     Area cleansed with:  Shur-Clens    Amount of cleaning:  Standard    SKIN REPAIR     Repair method:  Sutures    Suture size:  3-0    Suture technique:  Simple interrupted    Number of sutures:  1    PROCEDURE    Patient Tolerance:  Patient tolerated the procedure well with no immediate complications              Kenneth Christianson DO  09/13/24 1900

## 2024-09-13 NOTE — PLAN OF CARE
Prakash Prasad  September 13, 2024  Plan of Care Hand-off Note     Patient Care Path: inpatient mental health    Plan for Care:   It is the recommendation of this clinician that pt admit to  MH for safety and stabilization. Pt displays the following risk factors that support IP admission: active suicidal ideation with a plan, means, and intent. Pt is unable to engage in safety planning to mitigate risk level in a non-secure setting. Lower levels of care would not be sufficient in managing the level of risk pt is presenting with. Due to this IP is the least restrictive option of care for pt. Pt should remain in IP until deemed safe to return to the community and engage in OP MH supports. Pt will be able to resume work with established providers upon discharge.    Identified Goals and Safety Issues:   Patient is currently under a MI commitment through [facility] and has a  that he met with today. Per , they would revoke his provisional discharge if he were to request to discharge from the hospital.    Overview:  [phone]            Legal Status: Legal Status at Admission: Commitment ([facility] commitment, per , they will revoke PD if he requests to discharge)    Psychiatry Consult: Dr. Dean 9/13/24       Updated provider regarding plan of care.           MONIQUE Yepez
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
## 2024-09-13 NOTE — ED TRIAGE NOTES
Called and BIBA. SI 2 wks. Steak knife puncture x2 in left arm. Denies it as an attempt--endorses he did this as relief from suicidal ideations. Plan for suicide with intent, gabapentin OD.      Triage Assessment (Adult)       Row Name 09/13/24 1421          Triage Assessment    Airway WDL WDL        Respiratory WDL    Respiratory WDL WDL        Skin Circulation/Temperature WDL    Skin Circulation/Temperature WDL WDL        Cardiac WDL    Cardiac WDL WDL        Peripheral/Neurovascular WDL    Peripheral Neurovascular WDL X;neurovascular assessment upper        Cognitive/Neuro/Behavioral WDL    Cognitive/Neuro/Behavioral WDL X;mood/behavior     Mood/Behavior restless;sad        [place] Coma Scale    Best Eye Response 4-->(E4) spontaneous     Best Motor Response 6-->(M6) obeys commands     Best Verbal Response 5-->(V5) oriented     [place] Coma Scale Score 15        LUE Neurovascular Assessment    Temperature LUE warm     Color LUE red     Sensation LUE numbness present;tenderness present;tingling present

## 2024-09-13 NOTE — ED NOTES
Bed: [facility]  Expected date: 9/13/24  Expected time: 2:04 PM  Means of arrival:   Comments:  Gleason 34 yr M SI 2 puncture wounds to Lt arm

## 2024-09-14 ENCOUNTER — TELEPHONE (OUTPATIENT)
Dept: BEHAVIORAL HEALTH | Facility: CLINIC | Age: 34
End: 2024-09-14

## 2024-09-14 ENCOUNTER — TELEPHONE (OUTPATIENT)
Dept: BEHAVIORAL HEALTH | Facility: CLINIC | Age: 34
End: 2024-09-14
Payer: MEDICARE

## 2024-09-14 LAB
AMPHETAMINES UR QL SCN: ABNORMAL
BARBITURATES UR QL SCN: ABNORMAL
BENZODIAZ UR QL SCN: ABNORMAL
BZE UR QL SCN: ABNORMAL
CANNABINOIDS UR QL SCN: ABNORMAL
FENTANYL UR QL: ABNORMAL
HCG UR QL: NEGATIVE
OPIATES UR QL SCN: ABNORMAL
PCP QUAL URINE (ROCHE): ABNORMAL

## 2024-09-14 PROCEDURE — 250N000013 HC RX MED GY IP 250 OP 250 PS 637: Performed by: EMERGENCY MEDICINE

## 2024-09-14 PROCEDURE — 81025 URINE PREGNANCY TEST: CPT | Performed by: FAMILY MEDICINE

## 2024-09-14 PROCEDURE — 80307 DRUG TEST PRSMV CHEM ANLYZR: CPT | Performed by: PSYCHIATRY & NEUROLOGY

## 2024-09-14 PROCEDURE — 250N000013 HC RX MED GY IP 250 OP 250 PS 637: Performed by: PSYCHIATRY & NEUROLOGY

## 2024-09-14 PROCEDURE — 250N000013 HC RX MED GY IP 250 OP 250 PS 637: Performed by: FAMILY MEDICINE

## 2024-09-14 RX ORDER — OLANZAPINE 5 MG/1
5 TABLET, ORALLY DISINTEGRATING ORAL ONCE
Status: COMPLETED | OUTPATIENT
Start: 2024-09-14 | End: 2024-09-14

## 2024-09-14 RX ORDER — QUETIAPINE FUMARATE 50 MG/1
50 TABLET, FILM COATED ORAL 2 TIMES DAILY PRN
Status: DISCONTINUED | OUTPATIENT
Start: 2024-09-14 | End: 2024-09-14

## 2024-09-14 RX ORDER — VALACYCLOVIR HYDROCHLORIDE 500 MG/1
1000 TABLET, FILM COATED ORAL 2 TIMES DAILY PRN
Status: DISCONTINUED | OUTPATIENT
Start: 2024-09-14 | End: 2024-09-17

## 2024-09-14 RX ORDER — ARIPIPRAZOLE 10 MG/1
10 TABLET ORAL DAILY
Status: DISCONTINUED | OUTPATIENT
Start: 2024-09-15 | End: 2024-09-17

## 2024-09-14 RX ORDER — OLANZAPINE 10 MG/1
10 TABLET, ORALLY DISINTEGRATING ORAL 2 TIMES DAILY PRN
Status: DISCONTINUED | OUTPATIENT
Start: 2024-09-14 | End: 2024-09-24 | Stop reason: HOSPADM

## 2024-09-14 RX ORDER — OLANZAPINE 5 MG/1
5 TABLET, ORALLY DISINTEGRATING ORAL 2 TIMES DAILY PRN
Status: DISCONTINUED | OUTPATIENT
Start: 2024-09-14 | End: 2024-09-14

## 2024-09-14 RX ADMIN — Medication 1 LOZENGE: at 06:05

## 2024-09-14 RX ADMIN — ARIPIPRAZOLE 5 MG: 5 TABLET ORAL at 07:24

## 2024-09-14 RX ADMIN — OLANZAPINE 5 MG: 5 TABLET, ORALLY DISINTEGRATING ORAL at 13:43

## 2024-09-14 RX ADMIN — NICOTINE POLACRILEX 2 MG: 2 GUM, CHEWING ORAL at 23:04

## 2024-09-14 RX ADMIN — NICOTINE POLACRILEX 2 MG: 2 GUM, CHEWING ORAL at 16:56

## 2024-09-14 RX ADMIN — NICOTINE POLACRILEX 2 MG: 2 GUM, CHEWING ORAL at 14:27

## 2024-09-14 RX ADMIN — GABAPENTIN 1200 MG: 600 TABLET, FILM COATED ORAL at 20:12

## 2024-09-14 RX ADMIN — NICOTINE POLACRILEX 2 MG: 2 GUM, CHEWING ORAL at 12:08

## 2024-09-14 RX ADMIN — OLANZAPINE 5 MG: 5 TABLET, ORALLY DISINTEGRATING ORAL at 06:45

## 2024-09-14 RX ADMIN — OLANZAPINE 10 MG: 10 TABLET, ORALLY DISINTEGRATING ORAL at 16:55

## 2024-09-14 RX ADMIN — GABAPENTIN 600 MG: 600 TABLET, FILM COATED ORAL at 16:54

## 2024-09-14 RX ADMIN — QUETIAPINE FUMARATE 250 MG: 200 TABLET ORAL at 20:12

## 2024-09-14 RX ADMIN — NICOTINE POLACRILEX 2 MG: 2 GUM, CHEWING ORAL at 05:53

## 2024-09-14 RX ADMIN — LITHIUM CARBONATE 900 MG: 450 TABLET, EXTENDED RELEASE ORAL at 20:13

## 2024-09-14 RX ADMIN — AMLODIPINE BESYLATE 10 MG: 5 TABLET ORAL at 07:23

## 2024-09-14 RX ADMIN — NICOTINE POLACRILEX 2 MG: 2 GUM, CHEWING ORAL at 20:18

## 2024-09-14 RX ADMIN — VALACYCLOVIR HYDROCHLORIDE 1000 MG: 1 TABLET, FILM COATED ORAL at 07:34

## 2024-09-14 RX ADMIN — CLONAZEPAM 0.25 MG: 0.5 TABLET ORAL at 20:13

## 2024-09-14 RX ADMIN — CLONAZEPAM 0.25 MG: 0.5 TABLET ORAL at 07:28

## 2024-09-14 RX ADMIN — ROSUVASTATIN CALCIUM 40 MG: 20 TABLET, FILM COATED ORAL at 07:33

## 2024-09-14 RX ADMIN — GABAPENTIN 1200 MG: 600 TABLET, FILM COATED ORAL at 07:30

## 2024-09-14 RX ADMIN — FLUPHENAZINE HYDROCHLORIDE 2 MG: 1 TABLET, FILM COATED ORAL at 07:29

## 2024-09-14 RX ADMIN — PAROXETINE HYDROCHLORIDE 10 MG: 10 TABLET, FILM COATED ORAL at 07:31

## 2024-09-14 ASSESSMENT — ACTIVITIES OF DAILY LIVING (ADL)
ADLS_ACUITY_SCORE: 39

## 2024-09-14 NOTE — PROGRESS NOTES
"Triage & Transition Services, Extended Care     Therapy Progress Note    Patient: Prakash goes by \"Prakash,\" uses he/him pronouns  Date of Service: September 14, 2024  Site of Service: [facility] EMERGENCY DEPARTMENT                             ED09  Patient was seen yes  Mode of Assessment: In person    Presentation Summary: LMHP met with pt for a therapeutic check-in at his request due to significant anxiety around being reviewed at [facility]. They report they have never been admitted anywhere other than [place] and they are \"freaking out\" about this unknown. Attempted to explore more about what pt may be anxious about and if they were anxious the first time they came to [place]. Pt acknowledged this and reports \"I know what you are trying to say, and I get that, \" but continued to express preference to remain at  as they wanted to be somewhere they felt safe and reports they are okay with waiting. Confirmed with pt that placement preferences had been updated to  only. Pt is his own legal guardian and his commitment has not been revoked - he is currently voluntary. He reports his  told him they would not revoke him if he voluntarily sought treatment which he still agrees to do. Pt reports he feels he is having a mixed episode and reports feeling high energy, restlessness, anxiety and nausea. Pt observed sitting but legs bouncing during entire interaction. Pt reports he feels he could \"run around this whole building five times\" and still have energy. Reports feeling very impulsive lately and continues to endorse this, but agrees to safe behaviors in the ED. He reports at the same time, he feels quite depressed, but he usually does not have much energy at all when he feels depressed. LMHP validated that these symptoms could be experienced at the same time and could be addressed. Pt reports prior to coming to the ED, he had not slept in 3-4 days, but last night he was able to sleep " around 3-4 hours. He reports disappointment as he was hoping to sleep more, but could not get himself to. He reports both his body and mind feel restless and high energy, no fatigue at all, he just knows that he should be sleeping and it is likely exacerbating symptoms. Pt continues to endorse SI and urges to self-harm and reports feeling this very intensely. Still agrees to safety. Reports ongoing command AH that tell him to kill himself, present at the time of assessment. Reports it sounds like one male voice that is very loud. Reports music and TV help to calm him and drown out the voices. Reports VH - seeing colors and lines. Reports not as distressed by these. Reports lack of appetite for the past week.    Therapeutic Intervention(s) Provided: Coached on coping techniques/relaxation skills to help improve distress tolerance and managing intense emotions., Reviewed healthy living that supports positive mental health, including looking at sleep hygiene, regular movement, nutrition, and regular socialization., Explored strategies for self-soothing.    Current Symptoms: excessive worry, anxious difficulty concentrating, sense of doom, hoplessness, helplessness, thoughts of death/suicide, impaired decision making anxious, excessive worry distractability, auditory hallucinations, visual hallucinations, impulsive, hyperactive loss of appetite    Mental Status Exam   Affect: Appropriate  Appearance: Appropriate  Attention Span/Concentration: Attentive  Eye Contact: Engaged    Fund of Knowledge: Appropriate   Language /Speech Content: Fluent  Language /Speech Volume: Normal  Language /Speech Rate/Productions: Normal  Recent Memory: Intact  Remote Memory: Intact  Mood: Sad, Anxious  Orientation to Person: Yes   Orientation to Place: Yes  Orientation to Time of Day: Yes  Orientation to Date: Yes     Situation (Do they understand why they are here?): Yes  Psychomotor Behavior: Normal (leg tremors from anxiety)  Thought  Content: Clear  Thought Form: Goal Directed    Treatment Objective(s) Addressed: rapport building, orienting the patient to therapy, identifying and practicing coping strategies    Patient Response to Interventions: acceptance expressed, verbalizes understanding    Progress Towards Goals: Patient Reports Symptoms Are: ongoing  Patient Progress Toward Goals: is not making progress  Comment: pt would benefit from further stabilization  Next Step to Work Toward Discharge: symptom stabilization  Symptom Stabilization Comment: Pt continuing to present with command AH and SI, unable to safety plan    Case Management: Case Management Included: skills work  Details on skills work: identifying emotions, positive coping skills    Plan: inpatient mental health  yes provider Dr. Dean  yes    Clinical Substantiation: Continue to recommend admission to [facility] for safety and stabilization. Pt continues to endorse SI with command AH telling him to kill himself. Pt is unable to engage in safety planning to mitigate risk level in a non-secure setting. Lower levels of care would not be sufficient in managing the level of risk pt is presenting with. Due to this, IP is the least restrictive option of care for pt. Pt should remain in IP until deemed safe to return to the community and engage in OP  supports. Pt will be able to resume work with established providers upon discharge.    Legal Status: Legal Status at Admission: Commitment, Voluntary/Patient has signed consent for treatment ([facility] commitment, per , they will revoke PD if he requests to discharge)    Session Status: Time session started: 1330  Time session ended: 1350  Session Duration (minutes): 20 minutes  Session Number: 1  Anticipated number of sessions or this episode of care: 5    Time Spent: 20 minutes    CPT Code: CPT Codes: 94424 - Psychotherapy (with patient) - 30 (16-37*) min    Diagnosis:   Patient Active Problem List   Diagnosis Code    ADHD  (attention deficit hyperactivity disorder) F90.9    Bipolar 1 disorder, manic, mild F31.11    Marijuana abuse F12.10    Polysubstance abuse (H) F19.10    GERD (gastroesophageal reflux disease) K21.9    Tobacco abuse Z72.0    Intractable back pain M54.9    Optic neuritis H46.9    Cauda equina syndrome with neurogenic bladder (H) G83.4    Schizoaffective disorder, bipolar type (H) F25.0    PTSD (post-traumatic stress disorder) F43.10    Anxiety F41.9    Auditory hallucination R44.0    Nephrolithiasis N20.0    Cyst of left ovary N83.202    Borderline personality disorder (H) F60.3    Cannabis use disorder, severe, dependence (H) F12.20    Depression F32.A    Episodic mood disorder (H24) F39    History of heroin abuse (H) F11.11    Moderate episode of recurrent major depressive disorder (H) F33.1    Opioid use disorder, severe, dependence (H) F11.20    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    Nausea R11.0    Urinary retention R33.9    Chronic bilateral low back pain without sciatica M54.50, G89.29    AVA (generalized anxiety disorder) F41.1    Aggressive behavior R46.89    Lumbosacral radiculopathy at L5 M54.17    Abnormal uterine bleeding N93.9    Acanthosis nigricans L83    Schizoaffective disorder, chronic condition with acute exacerbation (H) F25.9    Bipolar affective disorder, mixed, severe, with psychotic behavior (H) F31.64    Schizophrenia, schizoaffective, chronic with acute exacerbation (H) F25.9    Akathisia G25.71    Hypertension, unspecified type I10    Female-to-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 person Z78.9    Morbid obesity (H) E66.01    Mood disorder due to a general medical condition F06.30    Pain of right hand M79.641    Acne vulgaris L70.0    Type 2 diabetes mellitus with hyperglycemia, with long-term current use of insulin (H) E11.65, Z79.4    Herpes labialis B00.1       Primary Problem This Admission:   Active Hospital Problems    *Schizoaffective disorder, bipolar type (H)   F25.0      MONIQUE Hill   Licensed Mental Health Professional (LMHP), [facility]  [phone]

## 2024-09-14 NOTE — TELEPHONE ENCOUNTER
R: [place]  Access Inpatient Bed Call Log 9/14/24 7:20 AM    Intake has called facilities that have not updated the bed status within the last 12 hours.  -[facility] to [facility] Only                           [facility] is at capacity.             12:48 PM Jesenia at  will review for admission.  1:10 PM Fax sent for review, awaiting callback.  1:39 PM Writer received call from [facility] that patient no longer interested in [facility] would like [facility] ONLY for placement.  1:39 PM [facility] review cancelled.    Pt remains on the work list pending appropriate bed availability.                  5-Fu Counseling: 5-Fluorouracil Counseling:  I discussed with the patient the risks of 5-fluorouracil including but not limited to erythema, scaling, itching, weeping, crusting, and pain.
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
## 2024-09-14 NOTE — TELEPHONE ENCOUNTER
R:[place]  Access Inpatient Bed Call Log 9/13/2024 6:00 PM  Intake has called facilities that have not updated the bed status within the last 12 hours.         (Adults);        METRO:                [facility] is posting 0 beds.             [facility] is posting 0 beds. [phone]:   [facility] is posting 0 beds. Negative covid required.   [facility] is posting 0 beds. Neg covid. No high school/[facility]-psych. [phone] 6:14 PM PER Meghna 3 beds.  [facility] is posting 0 beds. [phone]   [facility] is posting 0 bed. [phone]    [facility] is posting 5 beds. (Ages 18-35) Negative covid. [phone]   [facility] is posting 0 beds.    [facility] is posting 0 beds [phone]     9:46 PM PPS contaced [facility] per Dianelys, they can review, PPS faxed over clinical docs, pending cb.    10:34 PM Gopi from [facility] contacted PPS, that [facility] declined pt d/t them being unable to contract for safety, don't have an appropriate bed d/t transgender presentation, and they're short staffed.      Pt remains on the work list pending appropriate bed availability.

## 2024-09-14 NOTE — ED NOTES
IP MH Referral Acuity Rating Score (RARS)    LMHP complete at referral to IP MH, with DEC; and, daily while awaiting IP MH placement. Call score to PPS.  CRITERIA SCORING   New 72 HH and Involuntary for IP MH (not adolescent) 1/1   Boarding over 24 hours 1/1   Vulnerable adult at least 55+ with multiple co morbidities; or, Patient age 11 or under 0/1   Suicide ideation without relief of precipitating factors 1/1   Current plan for suicide 1/1   Current plan for homicide 0/1   Imminent risk or actual attempt to seriously harm another without relief of factors precipitating the attempt 0/1   Severe dysfunction in daily living (ex: complete neglect for self care, extreme disruption in vegetative function, extreme deterioration in social interactions) 1/1   Recent (last 2 weeks) or current physical aggression in the ED 0/1   Restraints or seclusion episode in ED 0/1   Verbal aggression, agitation, yelling, etc., while in the ED 0/1   Active psychosis with psychomotor agitation or catatonia 0/1   Need for constant or near constant redirection (from leaving, from others, etc).  0/1   Intrusive or disruptive behaviors 0/1   TOTAL Acuity Total Score: 5

## 2024-09-14 NOTE — TELEPHONE ENCOUNTER
R: [place]  Access Inpatient Bed Call Log  9/14/24 @ 1:00am   Intake has called facilities that have not updated their bed status within the last 12 hours.     *METRO:  [place] -- [facility]: @ capacity.  [facility]: @ cap per website. Reporting no reviews overnight.  [place] -- [facility]: @ cap per website. Low acuity  [place] -- [facility]: @ cap per website. Low acuity only.  [place] -- [facility]: @ cap per website.  [facility]: @ cap per website.   [facility]/ beds: POSTING 5 BEDS. Ages 18-35, Voluntary only, NO aggression/physical/sexual assault, violence hx or drug abuse, or psychosis. Negative Covid -   [place] -- [facility]: @ cap per website.  [place] -- [facility]: @ cap per website.  [place] -- [facility]: @ cap per website. Do not review overnight.      Pt remains on waitlist pending appropriate placement availability

## 2024-09-15 ENCOUNTER — TELEPHONE (OUTPATIENT)
Dept: BEHAVIORAL HEALTH | Facility: CLINIC | Age: 34
End: 2024-09-15
Payer: MEDICARE

## 2024-09-15 LAB
ATRIAL RATE - MUSE: 89 BPM
DIASTOLIC BLOOD PRESSURE - MUSE: NORMAL MMHG
GLUCOSE BLDC GLUCOMTR-MCNC: 141 MG/DL (ref 70–99)
INTERPRETATION ECG - MUSE: NORMAL
P AXIS - MUSE: 25 DEGREES
PR INTERVAL - MUSE: 172 MS
QRS DURATION - MUSE: 96 MS
QT - MUSE: 372 MS
QTC - MUSE: 452 MS
R AXIS - MUSE: 7 DEGREES
SYSTOLIC BLOOD PRESSURE - MUSE: NORMAL MMHG
T AXIS - MUSE: 17 DEGREES
VENTRICULAR RATE- MUSE: 89 BPM

## 2024-09-15 PROCEDURE — 250N000013 HC RX MED GY IP 250 OP 250 PS 637: Performed by: PSYCHIATRY & NEUROLOGY

## 2024-09-15 PROCEDURE — 82962 GLUCOSE BLOOD TEST: CPT

## 2024-09-15 PROCEDURE — 250N000013 HC RX MED GY IP 250 OP 250 PS 637: Performed by: FAMILY MEDICINE

## 2024-09-15 PROCEDURE — A9270 NON-COVERED ITEM OR SERVICE: HCPCS | Performed by: FAMILY MEDICINE

## 2024-09-15 RX ORDER — TESTOSTERONE GEL, 1% 10 MG/G
100 GEL TRANSDERMAL DAILY
Status: DISCONTINUED | OUTPATIENT
Start: 2024-09-15 | End: 2024-09-24 | Stop reason: HOSPADM

## 2024-09-15 RX ORDER — HYDROXYZINE HYDROCHLORIDE 25 MG/1
25 TABLET, FILM COATED ORAL EVERY 6 HOURS PRN
Status: DISCONTINUED | OUTPATIENT
Start: 2024-09-15 | End: 2024-09-24 | Stop reason: HOSPADM

## 2024-09-15 RX ORDER — ACETAMINOPHEN 500 MG
1000 TABLET ORAL ONCE
Status: COMPLETED | OUTPATIENT
Start: 2024-09-15 | End: 2024-09-15

## 2024-09-15 RX ADMIN — ROSUVASTATIN CALCIUM 40 MG: 20 TABLET, FILM COATED ORAL at 07:53

## 2024-09-15 RX ADMIN — NICOTINE POLACRILEX 2 MG: 2 GUM, CHEWING ORAL at 06:13

## 2024-09-15 RX ADMIN — ACETAMINOPHEN 1000 MG: 500 TABLET ORAL at 17:25

## 2024-09-15 RX ADMIN — LITHIUM CARBONATE 900 MG: 450 TABLET, EXTENDED RELEASE ORAL at 21:45

## 2024-09-15 RX ADMIN — ARIPIPRAZOLE 10 MG: 10 TABLET ORAL at 07:41

## 2024-09-15 RX ADMIN — NICOTINE POLACRILEX 2 MG: 2 GUM, CHEWING ORAL at 13:48

## 2024-09-15 RX ADMIN — NICOTINE POLACRILEX 2 MG: 2 GUM, CHEWING ORAL at 18:46

## 2024-09-15 RX ADMIN — NICOTINE POLACRILEX 2 MG: 2 GUM, CHEWING ORAL at 10:44

## 2024-09-15 RX ADMIN — PAROXETINE HYDROCHLORIDE 10 MG: 10 TABLET, FILM COATED ORAL at 07:53

## 2024-09-15 RX ADMIN — NICOTINE POLACRILEX 2 MG: 2 GUM, CHEWING ORAL at 07:42

## 2024-09-15 RX ADMIN — AMLODIPINE BESYLATE 10 MG: 5 TABLET ORAL at 07:41

## 2024-09-15 RX ADMIN — OLANZAPINE 10 MG: 10 TABLET, ORALLY DISINTEGRATING ORAL at 10:44

## 2024-09-15 RX ADMIN — TESTOSTERONE 100 MG OF TESTOSTERONE: 50 GEL TRANSDERMAL at 09:40

## 2024-09-15 RX ADMIN — CLONAZEPAM 0.25 MG: 0.5 TABLET ORAL at 07:42

## 2024-09-15 RX ADMIN — CLONAZEPAM 0.25 MG: 0.5 TABLET ORAL at 19:32

## 2024-09-15 RX ADMIN — NICOTINE POLACRILEX 2 MG: 2 GUM, CHEWING ORAL at 19:53

## 2024-09-15 RX ADMIN — GABAPENTIN 600 MG: 600 TABLET, FILM COATED ORAL at 17:03

## 2024-09-15 RX ADMIN — GABAPENTIN 1200 MG: 600 TABLET, FILM COATED ORAL at 07:42

## 2024-09-15 RX ADMIN — EMPAGLIFLOZIN 10 MG: 10 TABLET, FILM COATED ORAL at 09:40

## 2024-09-15 RX ADMIN — HYDROXYZINE HYDROCHLORIDE 25 MG: 25 TABLET, FILM COATED ORAL at 15:19

## 2024-09-15 RX ADMIN — NICOTINE POLACRILEX 2 MG: 2 GUM, CHEWING ORAL at 17:03

## 2024-09-15 RX ADMIN — GABAPENTIN 1200 MG: 600 TABLET, FILM COATED ORAL at 19:32

## 2024-09-15 RX ADMIN — QUETIAPINE FUMARATE 250 MG: 200 TABLET ORAL at 21:45

## 2024-09-15 ASSESSMENT — ACTIVITIES OF DAILY LIVING (ADL)
ADLS_ACUITY_SCORE: 39

## 2024-09-15 NOTE — ED NOTES
Pt has visitor with 2 service dogs. Pt and visitor educated on dogs need to be in room, leashed, not out in the hallway, and cannot be coming in and out.   Pt and visitor verbalized understanding.

## 2024-09-15 NOTE — TELEPHONE ENCOUNTER
R: 30 / Saskia    10:36 PM Pt accepted by on call provider Dr. Domingo    10:42 PM Unit notified     10:50 PM Indicia completed

## 2024-09-15 NOTE — TELEPHONE ENCOUNTER
9:25 AM: Intake called station 30 [facility] to get an ETA. Unit is short on staffing. Pt will need an SIO. Per CRN, unit cannot accommodate a 4th SIO.    9:32 AM: Intake paged ANS to review.    10:46 AM: Staffing is short on many of the units today. It is likely that Pt may not be able to admit until tomorrow. ANS recommended looking at outside placement. Pt only wants [facility]. Pt can remain in queue.

## 2024-09-15 NOTE — ED PROVIDER NOTES
[facility] ED Mental Health Handoff Note:       Brief HPI:  This is a 34 year old adult signed out to me by Dr. Guerin.  See initial ED Provider note for full details of the presentation. Interval history is pertinent for no reported events on the shift prior.  Patient on inpatient mental health waiting list due to schizoaffective disorder, suicidal ideation, and self injury..    Home meds reviewed and ordered/administered: Yes    Medically stable for inpatient mental health admission: Yes.    Evaluated by mental health: Yes. The recommendation is for inpatient mental health treatment. Bed search in process    Safety concerns: At the time I received sign out, there were no safety concerns.    Hold Status:  Active Orders   Legal    Emergency Hospitalization Hold (72 Hr Hold)     Frequency: Effective Now     Start Date/Time: 09/14/24 1811      Number of Occurrences: Until Specified            Exam:   Patient Vitals for the past 24 hrs:   BP Temp Temp src Pulse Resp SpO2   09/14/24 2015 138/86 98.1  F (36.7  C) Oral 81 18 95 %           ED Course:    Medications   nicotine (NICORETTE) gum 2 mg (2 mg Buccal $Given 9/15/24 0613)   amLODIPine (NORVASC) tablet 10 mg (10 mg Oral $Given 9/14/24 0723)   clonazePAM (klonoPIN) half-tab 0.25 mg (0.25 mg Oral $Given 9/14/24 2013)   gabapentin (NEURONTIN) tablet 1,200 mg (1,200 mg Oral $Given 9/14/24 2012)   gabapentin (NEURONTIN) tablet 600 mg (600 mg Oral $Given 9/14/24 1654)   lithium ER (LITHOBID) CR tablet 900 mg ( Oral Canceled Entry 9/14/24 2123)   PARoxetine (PAXIL) tablet 10 mg (10 mg Oral $Given 9/14/24 0731)   QUEtiapine (SEROquel) tablet 250 mg ( Oral Canceled Entry 9/14/24 2123)   rosuvastatin (CRESTOR) tablet 40 mg (40 mg Oral $Given 9/14/24 0733)   ARIPiprazole (ABILIFY) tablet 10 mg (has no administration in time range)   valACYclovir (VALTREX) tablet 1,000 mg (has no administration in time range)   OLANZapine zydis (zyPREXA) ODT tab 10 mg (10 mg Oral $Given  9/14/24 1655)   Td (tetanus & diphtheria toxoids) -  adult formulation - for ages 7 years and older (0.5 mLs Intramuscular $Given 9/13/24 1555)   hydrOXYzine HCl (ATARAX) tablet 50 mg (50 mg Oral $Given 9/13/24 1554)   QUEtiapine (SEROquel) tablet 100 mg (100 mg Oral $Given 9/13/24 1554)   lidocaine 1% with EPINEPHrine 1:100,000 injection 1 mL (1 mL Intradermal $Given by Other Clinician 9/13/24 1649)   acetaminophen (TYLENOL) tablet 650 mg (650 mg Oral $Given 9/13/24 1700)   OLANZapine (zyPREXA) injection 10 mg (10 mg Intramuscular $Given 9/13/24 1825)   acetaminophen (TYLENOL) tablet 650 mg (650 mg Oral $Given 9/13/24 2158)   OLANZapine zydis (zyPREXA) ODT tab 5 mg (5 mg Oral $Given 9/13/24 2311)   benzocaine-menthol (CHLORASEPTIC) 6-10 MG lozenge 1 lozenge (1 lozenge Buccal $Given 9/14/24 0605)   OLANZapine zydis (zyPREXA) ODT tab 5 mg (5 mg Oral $Given 9/14/24 0645)            There were no significant events during my shift.    Patient was signed out to the oncoming provider      Impression:    ICD-10-CM    1. Schizoaffective disorder, bipolar type (H)  F25.0       2. Suicidal ideation  R45.851       3. Self-injurious behavior  Z72.89           Plan:    Awaiting inpatient mental health admission/transfer.      RESULTS:   Results for orders placed or performed during the hospital encounter of 09/13/24 (from the past 24 hour(s))   Urine Drug Screen     Status: Abnormal    Collection Time: 09/14/24  7:41 AM    Narrative    The following orders were created for panel order Urine Drug Screen.  Procedure                               Abnormality         Status                     ---------                               -----------         ------                     Urine Drug Screen Panel[[id number]]      Abnormal            Final result                 Please view results for these tests on the individual orders.   HCG qualitative urine (UPT)     Status: Normal    Collection Time: 09/14/24  7:41 AM   Result Value Ref  Range    hCG Urine Qualitative Negative Negative   Urine Drug Screen Panel     Status: Abnormal    Collection Time: 09/14/24  7:41 AM   Result Value Ref Range    Amphetamines Urine Screen Negative Screen Negative    Barbituates Urine Screen Negative Screen Negative    Benzodiazepine Urine Screen Negative Screen Negative    Cannabinoids Urine Screen Positive (A) Screen Negative    Cocaine Urine Screen Negative Screen Negative    Fentanyl Qual Urine Screen Negative Screen Negative    Opiates Urine Screen Negative Screen Negative    PCP Urine Screen Negative Screen Negative             MD Fay Hilton David, MD  09/15/24 4549

## 2024-09-16 PROBLEM — F33.2 MAJOR DEPRESSIVE DISORDER, RECURRENT SEVERE WITHOUT PSYCHOTIC FEATURES (H): Status: ACTIVE | Noted: 2024-09-16

## 2024-09-16 LAB
GLUCOSE BLDC GLUCOMTR-MCNC: 153 MG/DL (ref 70–99)
GLUCOSE BLDC GLUCOMTR-MCNC: 155 MG/DL (ref 70–99)

## 2024-09-16 PROCEDURE — 250N000013 HC RX MED GY IP 250 OP 250 PS 637: Performed by: FAMILY MEDICINE

## 2024-09-16 PROCEDURE — 124N000002 HC R&B MH UMMC

## 2024-09-16 PROCEDURE — 96372 THER/PROPH/DIAG INJ SC/IM: CPT | Performed by: EMERGENCY MEDICINE

## 2024-09-16 PROCEDURE — 250N000013 HC RX MED GY IP 250 OP 250 PS 637: Performed by: PSYCHIATRY & NEUROLOGY

## 2024-09-16 PROCEDURE — 250N000011 HC RX IP 250 OP 636: Performed by: EMERGENCY MEDICINE

## 2024-09-16 PROCEDURE — A9270 NON-COVERED ITEM OR SERVICE: HCPCS | Performed by: FAMILY MEDICINE

## 2024-09-16 PROCEDURE — 99223 1ST HOSP IP/OBS HIGH 75: CPT | Performed by: PSYCHIATRY & NEUROLOGY

## 2024-09-16 PROCEDURE — 82962 GLUCOSE BLOOD TEST: CPT

## 2024-09-16 PROCEDURE — 250N000013 HC RX MED GY IP 250 OP 250 PS 637: Performed by: EMERGENCY MEDICINE

## 2024-09-16 RX ORDER — AMOXICILLIN 250 MG
1 CAPSULE ORAL 2 TIMES DAILY PRN
Status: DISCONTINUED | OUTPATIENT
Start: 2024-09-16 | End: 2024-09-24 | Stop reason: HOSPADM

## 2024-09-16 RX ORDER — TRAZODONE HYDROCHLORIDE 50 MG/1
50 TABLET, FILM COATED ORAL
Status: DISCONTINUED | OUTPATIENT
Start: 2024-09-16 | End: 2024-09-24 | Stop reason: HOSPADM

## 2024-09-16 RX ORDER — LOPERAMIDE HCL 2 MG
2 CAPSULE ORAL 4 TIMES DAILY PRN
Status: DISCONTINUED | OUTPATIENT
Start: 2024-09-16 | End: 2024-09-24 | Stop reason: HOSPADM

## 2024-09-16 RX ORDER — OLANZAPINE 10 MG/2ML
10 INJECTION, POWDER, FOR SOLUTION INTRAMUSCULAR ONCE
Status: COMPLETED | OUTPATIENT
Start: 2024-09-16 | End: 2024-09-16

## 2024-09-16 RX ORDER — NICOTINE 21 MG/24HR
1 PATCH, TRANSDERMAL 24 HOURS TRANSDERMAL DAILY
Status: DISCONTINUED | OUTPATIENT
Start: 2024-09-16 | End: 2024-09-16

## 2024-09-16 RX ORDER — OLANZAPINE 10 MG/2ML
10 INJECTION, POWDER, FOR SOLUTION INTRAMUSCULAR 3 TIMES DAILY PRN
Status: DISCONTINUED | OUTPATIENT
Start: 2024-09-16 | End: 2024-09-16

## 2024-09-16 RX ORDER — OLANZAPINE 10 MG/1
10 TABLET ORAL 3 TIMES DAILY PRN
Status: DISCONTINUED | OUTPATIENT
Start: 2024-09-16 | End: 2024-09-16

## 2024-09-16 RX ORDER — ONDANSETRON 4 MG/1
4 TABLET, ORALLY DISINTEGRATING ORAL EVERY 6 HOURS PRN
Status: DISCONTINUED | OUTPATIENT
Start: 2024-09-16 | End: 2024-09-17

## 2024-09-16 RX ORDER — MAGNESIUM HYDROXIDE/ALUMINUM HYDROXICE/SIMETHICONE 120; 1200; 1200 MG/30ML; MG/30ML; MG/30ML
30 SUSPENSION ORAL EVERY 4 HOURS PRN
Status: DISCONTINUED | OUTPATIENT
Start: 2024-09-16 | End: 2024-09-24 | Stop reason: HOSPADM

## 2024-09-16 RX ORDER — HYDROXYZINE HYDROCHLORIDE 25 MG/1
25 TABLET, FILM COATED ORAL EVERY 4 HOURS PRN
Status: DISCONTINUED | OUTPATIENT
Start: 2024-09-16 | End: 2024-09-16

## 2024-09-16 RX ORDER — NICOTINE 21 MG/24HR
1 PATCH, TRANSDERMAL 24 HOURS TRANSDERMAL DAILY
Status: DISCONTINUED | OUTPATIENT
Start: 2024-09-16 | End: 2024-09-24 | Stop reason: HOSPADM

## 2024-09-16 RX ORDER — ACETAMINOPHEN 325 MG/1
975 TABLET ORAL ONCE
Status: COMPLETED | OUTPATIENT
Start: 2024-09-16 | End: 2024-09-16

## 2024-09-16 RX ORDER — ACETAMINOPHEN 325 MG/1
650 TABLET ORAL EVERY 4 HOURS PRN
Status: DISCONTINUED | OUTPATIENT
Start: 2024-09-16 | End: 2024-09-23

## 2024-09-16 RX ADMIN — PAROXETINE HYDROCHLORIDE 10 MG: 10 TABLET, FILM COATED ORAL at 09:09

## 2024-09-16 RX ADMIN — ARIPIPRAZOLE 10 MG: 10 TABLET ORAL at 09:08

## 2024-09-16 RX ADMIN — LITHIUM CARBONATE 900 MG: 450 TABLET, EXTENDED RELEASE ORAL at 20:40

## 2024-09-16 RX ADMIN — ACETAMINOPHEN 650 MG: 325 TABLET ORAL at 20:08

## 2024-09-16 RX ADMIN — ROSUVASTATIN CALCIUM 40 MG: 20 TABLET, FILM COATED ORAL at 09:11

## 2024-09-16 RX ADMIN — NICOTINE POLACRILEX 2 MG: 2 GUM, CHEWING ORAL at 13:27

## 2024-09-16 RX ADMIN — QUETIAPINE FUMARATE 250 MG: 200 TABLET ORAL at 20:40

## 2024-09-16 RX ADMIN — TESTOSTERONE 100 MG OF TESTOSTERONE: 50 GEL TRANSDERMAL at 09:10

## 2024-09-16 RX ADMIN — HYDROXYZINE HYDROCHLORIDE 25 MG: 25 TABLET, FILM COATED ORAL at 10:54

## 2024-09-16 RX ADMIN — GABAPENTIN 1200 MG: 600 TABLET, FILM COATED ORAL at 20:40

## 2024-09-16 RX ADMIN — CLONAZEPAM 0.25 MG: 0.5 TABLET ORAL at 09:08

## 2024-09-16 RX ADMIN — NICOTINE POLACRILEX 2 MG: 2 GUM, CHEWING ORAL at 10:54

## 2024-09-16 RX ADMIN — OLANZAPINE 10 MG: 10 INJECTION, POWDER, FOR SOLUTION INTRAMUSCULAR at 11:47

## 2024-09-16 RX ADMIN — NICOTINE POLACRILEX 2 MG: 2 GUM, CHEWING ORAL at 22:04

## 2024-09-16 RX ADMIN — NICOTINE 1 PATCH: 21 PATCH, EXTENDED RELEASE TRANSDERMAL at 09:11

## 2024-09-16 RX ADMIN — GABAPENTIN 1200 MG: 600 TABLET, FILM COATED ORAL at 09:08

## 2024-09-16 RX ADMIN — GABAPENTIN 600 MG: 600 TABLET, FILM COATED ORAL at 17:39

## 2024-09-16 RX ADMIN — NICOTINE POLACRILEX 2 MG: 2 GUM, CHEWING ORAL at 20:07

## 2024-09-16 RX ADMIN — AMLODIPINE BESYLATE 10 MG: 5 TABLET ORAL at 09:09

## 2024-09-16 RX ADMIN — HYDROXYZINE HYDROCHLORIDE 25 MG: 25 TABLET, FILM COATED ORAL at 22:04

## 2024-09-16 RX ADMIN — CLONAZEPAM 0.25 MG: 0.5 TABLET ORAL at 20:41

## 2024-09-16 RX ADMIN — ACETAMINOPHEN 975 MG: 325 TABLET ORAL at 07:02

## 2024-09-16 RX ADMIN — EMPAGLIFLOZIN 10 MG: 10 TABLET, FILM COATED ORAL at 09:10

## 2024-09-16 RX ADMIN — OLANZAPINE 10 MG: 10 INJECTION, POWDER, FOR SOLUTION INTRAMUSCULAR at 02:39

## 2024-09-16 RX ADMIN — OLANZAPINE 10 MG: 10 TABLET, ORALLY DISINTEGRATING ORAL at 20:41

## 2024-09-16 RX ADMIN — HYDROXYZINE HYDROCHLORIDE 25 MG: 25 TABLET, FILM COATED ORAL at 00:19

## 2024-09-16 RX ADMIN — NICOTINE POLACRILEX 2 MG: 2 GUM, CHEWING ORAL at 06:29

## 2024-09-16 RX ADMIN — NICOTINE POLACRILEX 2 MG: 2 GUM, CHEWING ORAL at 02:05

## 2024-09-16 RX ADMIN — NICOTINE 1 PATCH: 21 PATCH, EXTENDED RELEASE TRANSDERMAL at 00:32

## 2024-09-16 ASSESSMENT — ACTIVITIES OF DAILY LIVING (ADL)
DRESS: INDEPENDENT
ADLS_ACUITY_SCORE: 53
ADLS_ACUITY_SCORE: 39
ADLS_ACUITY_SCORE: 53
ADLS_ACUITY_SCORE: 39
ADLS_ACUITY_SCORE: 39
ADLS_ACUITY_SCORE: 53
ADLS_ACUITY_SCORE: 39
ADLS_ACUITY_SCORE: 39
ORAL_HYGIENE: INDEPENDENT
ADLS_ACUITY_SCORE: 39
LAUNDRY: WITH SUPERVISION
ADLS_ACUITY_SCORE: 39
ADLS_ACUITY_SCORE: 39
ADLS_ACUITY_SCORE: 69
ADLS_ACUITY_SCORE: 39
ADLS_ACUITY_SCORE: 39
ADLS_ACUITY_SCORE: 53
ADLS_ACUITY_SCORE: 53
HYGIENE/GROOMING: INDEPENDENT
ADLS_ACUITY_SCORE: 39

## 2024-09-16 ASSESSMENT — LIFESTYLE VARIABLES: SKIP TO QUESTIONS 9-10: 1

## 2024-09-16 ASSESSMENT — PATIENT HEALTH QUESTIONNAIRE - PHQ9: SUM OF ALL RESPONSES TO PHQ9 QUESTIONS 1 & 2: 6

## 2024-09-16 NOTE — ED NOTES
Attempted to call report to unit, per charge they will not be taking any patients until after shift change. ED charge updated.

## 2024-09-16 NOTE — ED PROVIDER NOTES
[facility] ED Mental Health Handoff Note:       Brief HPI:  This is a 34 year old adult signed out to me by Dr. Beasley.  See initial ED Provider note for full details of the presentation. Interval history is pertinent for no acute events.    Home meds reviewed and ordered/administered: Yes    Medically stable for inpatient mental health admission: Yes.    Evaluated by mental health: Yes. The recommendation is for inpatient mental health treatment. Bed search in process    Safety concerns: At the time I received sign out, there were no safety concerns.    Hold Status:  Active Orders   Legal    Emergency Hospitalization Hold (72 Hr Hold)     Frequency: Effective Now     Start Date/Time: 09/14/24 1811      Number of Occurrences: Until Specified            Exam:   Patient Vitals for the past 24 hrs:   BP Temp Temp src Pulse Resp SpO2   09/16/24 0852 -- 98.1  F (36.7  C) Oral 88 18 98 %   09/15/24 2144 (!) 137/93 98.1  F (36.7  C) Oral -- 18 96 %   09/15/24 1639 (!) 133/92 98.7  F (37.1  C) Oral 89 18 94 %           ED Course:    Medications   nicotine (NICORETTE) gum 2 mg (2 mg Buccal $Given 9/16/24 1327)   amLODIPine (NORVASC) tablet 10 mg (10 mg Oral $Given 9/16/24 0909)   clonazePAM (klonoPIN) half-tab 0.25 mg (0.25 mg Oral $Given 9/16/24 0908)   gabapentin (NEURONTIN) tablet 1,200 mg (1,200 mg Oral $Given 9/16/24 0908)   gabapentin (NEURONTIN) tablet 600 mg (600 mg Oral $Given 9/15/24 1703)   lithium ER (LITHOBID) CR tablet 900 mg (900 mg Oral $Given 9/15/24 2145)   PARoxetine (PAXIL) tablet 10 mg (10 mg Oral $Given 9/16/24 0909)   QUEtiapine (SEROquel) tablet 250 mg (250 mg Oral $Given 9/15/24 2145)   rosuvastatin (CRESTOR) tablet 40 mg (40 mg Oral $Given 9/16/24 0911)   ARIPiprazole (ABILIFY) tablet 10 mg (10 mg Oral $Given 9/16/24 0908)   valACYclovir (VALTREX) tablet 1,000 mg (has no administration in time range)   OLANZapine zydis (zyPREXA) ODT tab 10 mg (10 mg Oral $Given 9/15/24 3184)   empagliflozin  (JARDIANCE) tablet 10 mg (10 mg Oral $Given 9/16/24 0910)   testosterone (ANDROGEL/TESTIM) 50 MG/5GM (1%) topical gel 100 mg of testosterone (100 mg of testosterone Transdermal $Given 9/16/24 0910)   hydrOXYzine HCl (ATARAX) tablet 25 mg (25 mg Oral $Given 9/16/24 1054)   nicotine (NICODERM CQ) 21 MG/24HR 24 hr patch 1 patch (1 patch Transdermal Patch/Med Removed 9/16/24 0912)   Td (tetanus & diphtheria toxoids) -  adult formulation - for ages 7 years and older (0.5 mLs Intramuscular $Given 9/13/24 1555)   hydrOXYzine HCl (ATARAX) tablet 50 mg (50 mg Oral $Given 9/13/24 1554)   QUEtiapine (SEROquel) tablet 100 mg (100 mg Oral $Given 9/13/24 1554)   lidocaine 1% with EPINEPHrine 1:100,000 injection 1 mL (1 mL Intradermal $Given by Other Clinician 9/13/24 1649)   acetaminophen (TYLENOL) tablet 650 mg (650 mg Oral $Given 9/13/24 1700)   OLANZapine (zyPREXA) injection 10 mg (10 mg Intramuscular $Given 9/13/24 1825)   acetaminophen (TYLENOL) tablet 650 mg (650 mg Oral $Given 9/13/24 2158)   OLANZapine zydis (zyPREXA) ODT tab 5 mg (5 mg Oral $Given 9/13/24 2311)   benzocaine-menthol (CHLORASEPTIC) 6-10 MG lozenge 1 lozenge (1 lozenge Buccal $Given 9/14/24 0605)   OLANZapine zydis (zyPREXA) ODT tab 5 mg (5 mg Oral $Given 9/14/24 0645)   acetaminophen (TYLENOL) tablet 1,000 mg (1,000 mg Oral $Given 9/15/24 1725)   OLANZapine (zyPREXA) injection 10 mg (10 mg Intramuscular $Given 9/16/24 0239)   acetaminophen (TYLENOL) tablet 975 mg (975 mg Oral $Given 9/16/24 0702)   OLANZapine (zyPREXA) injection 10 mg (10 mg Intramuscular $Given 9/16/24 1147)            There were no significant events during my shift. Awaiting bed placement.    Patient was signed out to the oncoming provider.      Impression:    ICD-10-CM    1. Schizoaffective disorder, bipolar type (H)  F25.0       2. Suicidal ideation  R45.851       3. Self-injurious behavior  Z72.89           Plan:    Awaiting inpatient mental health admission/transfer.      RESULTS:    Results for orders placed or performed during the hospital encounter of 09/13/24 (from the past 24 hour(s))   Psychiatry IP Consult: Recommendations. Pt is on a 72 hour hold; Consultant may enter orders: Yes; Requesting provider? Other supervising provider; Name: Jesenia Colby     Status: None ()    Collection Time: 09/16/24  7:12 AM    Lorelei Montes De Oca MD     9/16/2024  2:16 PM    Prakash Prasad MRN# [id number]   Age: 34 year old YOB: 1990   Date of Admission to ED: 9/13/2024    In person visit Details:     Patient was assessed and interviewed face-to-face in person with   this writer   Assessment methods included conducting a formal interview with   patient, review of medical records, collaboration with medical   staff, and obtaining relevant collateral information from family   and community providers when available. Lorelei Ling MD          History of Present Illness:     Patient presented to the ED on 9/13/2024 for suicidal thoughts,   anxiety, depression. Currently on a 72 hour hold, on commitment   through [facility] on provisional discharge. He got   triggered in a therapy group and started thinking about self   harm. Main stressor is living on his own instead of in a group   home, has been lonely. Cut himself in the arm with a steak knife   x 2, denies was a suicide attempt. Said he had a plan to overdose   and had the pills at home. Reports THC use, denies other drug   use, denies ETOH use. UDS + THC. Pt is aware that plan is for   admission and is ok with that.     Patient reports missing prolixin dec injections so plan was to   stop prolixin and increase abilify with goal of switching to   abilify maintena but this has not been done yet. Reports   medication compliance aside from the prolixin dec. No other   changes recently. Has been on Li for years, has been stable at   current dose.       Reviewed DEC assessment and ED notes.             Psychiatric and  AODA History:   Has natalia diagnosed with schizoaffective disorder, bipolar   disorder, borderline pd, ptsd, adhd, polysubstance abuse, TBI in   the past. Has case management, psychiatric provider, DBT   therapist, [facility]  currently. Multiple hospitalizations,   multiple suicide attempts. Has been on cymbalata, lamotrigine,   fluoxetine, paxil, olanzapine, haldol, seroquel, trazodone,   buspar, gabapentin, rexulti, geodon, adderall, xanax, lunesta,   intuniv, hydroxyzine, propranolol, prolixin, abiliy, lithium,   vyvanse, ativan, menatonin, concerta, risperdal, ambien,   strattera, stelazine, prazosin, trifluoperazine    History of polysubstance abuse including MDMA, opioids, THC,   methamphetamine, THC. Treatment 7 years ago. Occasional alcohol   use.         Past Medical and Surgical History:     PAST MEDICAL HISTORY:   Past Medical History:   Diagnosis Date    ADHD (attention deficit hyperactivity disorder)     Bipolar 1 disorder     Borderline personality disorder     Cauda equina syndrome     Chronic low back pain     Depression     Diabetes mellitus, type 2 (H) 1/19/2023    GERD (gastroesophageal reflux disease)     h/o TBI (traumatic brain injury)     Hypertension, unspecified type 12/16/2021    Marginal corneal ulcer, left 07/17/2015    Nephrolithiasis     obesity     Polysubstance abuse - methamphetamine, hallucinagen, heroin,   marijuana     currently in remission    PONV (postoperative nausea and vomiting)     PTSD (post-traumatic stress disorder)        PAST SURGICAL HISTORY:   Past Surgical History:   Procedure Laterality Date    BACK SURGERY  12/24/2016    BACK SURGERY - For Cauda Equina Syndrome  12/24/2016    COLONOSCOPY      COMBINED CYSTOSCOPY, INSERT STENT URETER(S) Left 8/30/2018    Procedure: COMBINED CYSTOSCOPY, INSERT STENT URETER(S);    Cystoscopy With Left Stent Placement;  Surgeon: Kiran Ulrich MD;  Location: [facility] OR    COMBINED CYSTOSCOPY, RETROGRADES, EXCHANGE STENT  URETER(S) Left   10/14/2018    Procedure: COMBINED CYSTOSCOPY, RETROGRADES, EXCHANGE STENT   URETER(S);  Cystoscopy and removal of left-sided stent.;    Surgeon: Stiven Olivo MD;  Location:  OR    COMBINED CYSTOSCOPY, RETROGRADES, URETEROSCOPY, INSERT STENT    1/6/2014    Procedure: COMBINED CYSTOSCOPY, RETROGRADES, URETEROSCOPY,   INSERT STENT;  Cystyoscopy place left ureteral stent;  Surgeon:   Jaun Kimble MD;  Location: [facility] OR    COMBINED CYSTOSCOPY, RETROGRADES, URETEROSCOPY, INSERT STENT   Left 10/23/2018    Procedure: Video cystoscopy, left ureteroscopy with stone   extraction;  Surgeon: Bull Mast MD;  Location:  OR    CYSTOSCOPY, URETEROSCOPY, COMBINED Right 9/23/2015    Procedure: COMBINED CYSTOSCOPY, URETEROSCOPY;  Surgeon: ROME Jett MD;  Location: [facility] OR    ENT SURGERY      ESOPHAGOSCOPY, GASTROSCOPY, DUODENOSCOPY (EGD), COMBINED    4/8/2013    Procedure: COMBINED ESOPHAGOSCOPY, GASTROSCOPY, DUODENOSCOPY   (EGD), BIOPSY SINGLE OR MULTIPLE;  Gastroscopy;  Surgeon: Peter Pickard MD;  Location: [facility] GI    ESOPHAGOSCOPY, GASTROSCOPY, DUODENOSCOPY (EGD), COMBINED Left   10/28/2014    Procedure: COMBINED ESOPHAGOSCOPY, GASTROSCOPY, DUODENOSCOPY   (EGD), BIOPSY SINGLE OR MULTIPLE;  Surgeon: Narcisa Ramirez MD;    Location:  OR    ESOPHAGOSCOPY, GASTROSCOPY, DUODENOSCOPY (EGD), COMBINED N/A   12/24/2018    Procedure: COMBINED ESOPHAGOSCOPY, GASTROSCOPY, DUODENOSCOPY   (EGD), BIOPSY SINGLE OR MULTIPLE;  Surgeon: Sonu Verduzco MD;    Location: [facility]    INJECT EPIDURAL TRANSFORAMINAL LUMBAR / SACRAL EA ADDITIONAL   LEVEL Left 3/18/2021    Procedure: Left L5/S1 transforaminal injection for selective L5   nerve root block;  Surgeon: Eliza Pagan MD;  Location: [facility]   OR    LAPAROSCOPIC CHOLECYSTECTOMY  11/20/2014    [facility], Dr. Ramirez    LASER HOLMIUM LITHOTRIPSY URETER(S), INSERT STENT, COMBINED    4/2/2014    Procedure: COMBINED CYSTOSCOPY,  URETEROSCOPY, LASER HOLMIUM   LITHOTRIPSY URETER(S), INSERT STENT;  Cystoscopy,Left Ureteral   Stent Removal,Left Ureteroscopy with Laser Lithotripsy,Left   Ureter Stent Placement;  Surgeon: ROME Jett MD;  Location:   [facility] OR    Transurethral stone resection  03/11/2011                Pertinent Social and Family History:     Living in an apartment, recently moved from a group home.   Unemployed, on disability          Allergies and Medications:     Allergies   Allergen Reactions    Haldol [Haloperidol] Other (See Comments)     Makes patient very angry and anxious    Adhesive Tape Hives    Prednisone Other (See Comments) and Hives     Suicidal ideation    Droperidol Anxiety       Medications:     Current Facility-Administered Medications   Medication Dose Route Frequency Provider Last Rate Last Admin    amLODIPine (NORVASC) tablet 10 mg  10 mg Oral Daily Marco Dean MD   10 mg at 09/16/24 0909    ARIPiprazole (ABILIFY) tablet 10 mg  10 mg Oral Daily Mark Aponte MD   10 mg at 09/16/24 0908    clonazePAM (klonoPIN) half-tab 0.25 mg  0.25 mg Oral BID Marco Dean MD   0.25 mg at 09/16/24 0908    empagliflozin (JARDIANCE) tablet 10 mg  10 mg Oral Daily   Mark Aponte MD   10 mg at 09/16/24 0910    gabapentin (NEURONTIN) tablet 1,200 mg  1,200 mg Oral BID   Marco Dean MD   1,200 mg at 09/16/24 0908    gabapentin (NEURONTIN) tablet 600 mg  600 mg Oral Daily with   supper Marco Dean MD   600 mg at 09/15/24 1703    hydrOXYzine HCl (ATARAX) tablet 25 mg  25 mg Oral Q6H PRN   Mark Aponte MD   25 mg at 09/16/24 1054    lithium ER (LITHOBID) CR tablet 900 mg  900 mg Oral At Bedtime   Marco Dean MD   900 mg at 09/15/24 2145    nicotine (NICODERM CQ) 21 MG/24HR 24 hr patch 1 patch  1 patch   Transdermal Daily Hector Mendoza MD   1 patch at   09/16/24 0911    nicotine (NICORETTE) gum 2 mg  2 mg Buccal Q1H PRN Marco Dean MD   2 mg at 09/16/24 5672     OLANZapine zydis (zyPREXA) ODT tab 10 mg  10 mg Oral BID PRN   Mark Aponte MD   10 mg at 09/15/24 1044    PARoxetine (PAXIL) tablet 10 mg  10 mg Oral Daily Marco Dean MD   10 mg at 09/16/24 0909    QUEtiapine (SEROquel) tablet 250 mg  250 mg Oral At Bedtime   Marco Dean MD   250 mg at 09/15/24 2145    rosuvastatin (CRESTOR) tablet 40 mg  40 mg Oral Daily Marco Dean MD   40 mg at 09/16/24 0911    testosterone (ANDROGEL/TESTIM) 50 MG/5GM (1%) topical gel 100 mg   of testosterone  100 mg of testosterone Transdermal Daily   Mark Aponte MD   100 mg of testosterone at 09/16/24 0910    valACYclovir (VALTREX) tablet 1,000 mg  1,000 mg Oral BID PRN   Mark Aponte MD         Current Outpatient Medications   Medication Sig Dispense Refill    amLODIPine (NORVASC) 10 MG tablet Take 1 tablet (10 mg) by mouth   daily 90 tablet 0    ARIPiprazole (ABILIFY) 5 MG tablet Take 1 tablet (5 mg) by mouth   every morning (Patient taking differently: Take 10 mg by mouth   every morning.) 30 tablet 0    clonazePAM (KLONOPIN) 0.5 MG tablet Take 0.5 tablets (0.25 mg)   by mouth 2 times daily (Patient taking differently: Take 0.5 mg   by mouth 2 times daily.) 30 tablet 0    empagliflozin (JARDIANCE) 10 MG TABS tablet Take 1 tablet (10   mg) by mouth daily 90 tablet 0    gabapentin (NEURONTIN) 600 MG tablet Take 2 tablets by mouth in   the morning, 1 tablet in the afternoon, and 2 tablets in the   evening (total 5 tablets daily). (Patient taking differently:   Take 600-1,200 mg by mouth 3 times daily. Take 1200 mg by mouth   in the morning, 600mg in the afternoon, and 1200mg in the evening   (total 5 tablets daily).) 150 tablet 3    lithium (ESKALITH CR/LITHOBID) 450 MG CR tablet Take 2 tablets   (900 mg) by mouth at bedtime 60 tablet 0    ondansetron (ZOFRAN ODT) 4 MG ODT tab Take 1 tablet (4 mg) by   mouth every 8 hours as needed for nausea. 30 tablet 0    PARoxetine (PAXIL) 10 MG tablet Take 1  tablet (10 mg) by mouth   daily 30 tablet 0    QUEtiapine (SEROQUEL) 200 MG tablet Take 1 tablet (200 mg) by   mouth at bedtime (Patient taking differently: Take 200 mg by   mouth at bedtime. With 50mg dose for a total of 250mg at bedtime)   30 tablet 2    QUEtiapine (SEROQUEL) 50 MG tablet Take 1 tablet (50 mg) by   mouth at bedtime (Patient taking differently: Take 50 mg by mouth   at bedtime. With 200mg dose for a total of 250mg at bedtime) 30   tablet 1    rosuvastatin (CRESTOR) 40 MG tablet Take 1 tablet (40 mg) by   mouth daily 30 tablet 2    Semaglutide (RYBELSUS) 7 MG tablet Take 1 tablet (7 mg) by mouth   daily. 90 tablet 0    testosterone (ANDROGEL/TESTIM) 50 MG/5GM (1%) topical gel Place   2 packets (100 mg of testosterone) onto the skin daily 30 packet   2    valACYclovir (VALTREX) 1000 mg tablet Take 2 tablets (2,000 mg)   by mouth 2 times daily (Patient taking differently: Take 2,000 mg   by mouth 2 times daily as needed.) 8 tablet 2    ACE/ARB/ARNI NOT PRESCRIBED (INTENTIONAL) Please choose reason   not prescribed from choices below.      blood glucose (NO BRAND SPECIFIED) test strip Use to test blood   sugar one times daily and as needed. To accompany: Blood Glucose   Monitor Brands: per insurance. 100 strip 3    insulin pen needle (32G X 4 MM) 32G X 4 MM miscellaneous Use 1   pen needles daily or as directed. 100 each 2    thin (NO BRAND SPECIFIED) lancets Use with lanceting device to   check blood sugars once per day. To accompany: Blood Glucose   Monitor Brands: per insurance. 100 each 2           Mental Status Exam:   Appearance:  awake, alert, adequately groomed, and casually   dressed  Attitude:  cooperative  Eye Contact:  good  Mood:  depressed  Affect:  mood congruent and intensity is blunted  Speech:  clear, coherent  Psychomotor Behavior:  no evidence of tardive dyskinesia,   dystonia, or tics  Thought Process:  logical, linear, and goal oriented  Associations:  no loose  "associations  Thought Content:  plan for suicide present, no auditory   hallucinations present, and no visual hallucinations present  Insight:  limited  Judgment:  limited  Oriented to:  time, person, and place  Attention Span and Concentration:  intact  Recent and Remote Memory:  intact         Physical Exam:     BP (!) 137/93   Pulse 88   Temp 98.1  F (36.7  C) (Oral)     Resp 18   Ht 1.676 m (5' 6\")   LMP  (LMP Unknown)   SpO2 98%     BMI 36.49 kg/m    Weight is 0 lbs 0 oz 5' 6\" Body mass index is 36.49 kg/m .    Laboratory/Imaging:    Recent Results (from the past 72 hour(s))   EKG 12 lead    Collection Time: 09/13/24  2:52 PM   Result Value Ref Range    Systolic Blood Pressure  mmHg    Diastolic Blood Pressure  mmHg    Ventricular Rate 89 BPM    Atrial Rate 89 BPM    WI Interval 172 ms    QRS Duration 96 ms     ms    QTc 452 ms    P Axis 25 degrees    R AXIS 7 degrees    T Axis 17 degrees    Interpretation ECG       Sinus rhythm with Premature ventricular complexes or Fusion   complexes  Otherwise normal ECG  Unconfirmed report - interpretation of this ECG is computer   generated - see medical record for final interpretation  Confirmed by - EMERGENCY ROOM, PHYSICIAN ([id number]),  SYDNEY VAUGHN ([id number]) on 9/15/2024 2:39:57 PM     HCG qualitative urine (UPT)    Collection Time: 09/14/24  7:41 AM   Result Value Ref Range    hCG Urine Qualitative Negative Negative   Urine Drug Screen Panel    Collection Time: 09/14/24  7:41 AM   Result Value Ref Range    Amphetamines Urine Screen Negative Screen Negative    Barbituates Urine Screen Negative Screen Negative    Benzodiazepine Urine Screen Negative Screen Negative    Cannabinoids Urine Screen Positive (A) Screen Negative    Cocaine Urine Screen Negative Screen Negative    Fentanyl Qual Urine Screen Negative Screen Negative    Opiates Urine Screen Negative Screen Negative    PCP Urine Screen Negative Screen Negative   Glucose by meter    Collection " Time: 09/15/24  9:00 AM   Result Value Ref Range    GLUCOSE BY METER POCT 141 (H) 70 - 99 mg/dL   Glucose by meter    Collection Time: 09/16/24  9:22 AM   Result Value Ref Range    GLUCOSE BY METER POCT 153 (H) 70 - 99 mg/dL       ROS: 10 point ROS neg other than the symptoms noted above in the   HPI.            Impression:   This patient is a 34 year old year old adult with a history of    multiple diagnoses including schizoaffective disorder, borderline   pd, ptsd, TBI, substance abuse who presented to the ED on   9/13/2024  for suicidal thoughts with a plan to overdose. Had   also engaged in cutting behavior which he denies was a suicide   attempt. Lacerations repaired. Was triggered by the content of a   group discussion. Stressors include moving from a group home to   an apartment and feeling lonely.  On commitment,    wanting to revoke conditional release if patient refuses care.          Diagnoses:   Principal Diagnosis: schizoaffective disorder, borderline pd    Secondary psychiatric diagnoses of concern this admission:  PTSD, TBI    Current medical diagnosis being treated:   Lacertaions repaired         Recommendations:        Recommend acute inpatient stabilization, awaiting a bed  2.   Recommend starting continuing current psychiatric   medications. Outpatient plan was for abilify          maintena, would consider if he remains in the ED.    .   Continue to consult psychiatry as needed.    Please call [facility]/DEC at [phone] if you have follow-up   questions or wish to place another consult.         Attestation       I have provided critical care services at the bedside in the [facility] adult emergency department, evaluating the patient,   reviewing notes and laboratory values and directing care. Spoke   with [facility] about patient's care. Lorelei Ling MD September 16, 2024.    Disclaimer: This note consists of symbols derived from   keyboarding, dictation, and/or voice  recognition software. As a   result, there may be errors in the script that have gone   undetected.  Please consider this when interpreting information   found in the chart.       Glucose by meter     Status: Abnormal    Collection Time: 09/16/24  9:22 AM   Result Value Ref Range    GLUCOSE BY METER POCT 153 (H) 70 - 99 mg/dL             MD Adam Lynn Michael, MD  09/16/24 1548

## 2024-09-16 NOTE — ED NOTES
"1. What PRN did patient receive? Atarax/Vistaril    2. What was the patient doing that led to the PRN medication? Anxiety    3. Did they require R/S? NO    4. Side effects to PRN medication? None    5. After 1 Hour, patient appeared: Other    Pt is reporting increased anxiety and irritability and requesting IM zyprexa, does not want oral. MD updated.    POST ZYPREXA ASSESSMENT: Pt reported feeling less irritable. Pt said \"I was going to run out of ER (elope) but I want to be good that's why I asked for zyprexa IM to work\".   "

## 2024-09-16 NOTE — ED NOTES
Pt calm and cooperative. Continues to report SI with auditory hallucinations.1:1 in place. Lac drsg CDI.

## 2024-09-16 NOTE — ED NOTES
Triage & Transition Services, Extended Care     Prakash Prasad  September 16, 2024      Prakash is followed related to long wait time for IP MH bed    . Please see initial DEC Crisis Assessment completed for complete assessment information. Medical record is reviewed. Prakash has been accepted to station 30, awaiting sufficient staffing. Prakash requested to meet with this writer for plan of care update. Writer shared update, pt in agreement with plan and denied additional needs/questions at this time.     Patient was seen yes  Mode of Assessment: In person    There  are no significant status changes.     Recommend to continue care coordination with      Plan:  inpatient mental health (accepted to 30, awaiting sufficient staffing on unit.)    Plan for Care reviewed with Assigned Medical Provider?   yes    Comments:  Dr. Cyndi Hernandez will follow and meet with patient/family/care team as able or requested.     David Rees, [facility], Extended Care   [phone]

## 2024-09-16 NOTE — CONSULTS
Prakash Prasad MRN# [id number]   Age: 34 year old YOB: 1990   Date of Admission to ED: 9/13/2024    In person visit Details:     Patient was assessed and interviewed face-to-face in person with this writer   Assessment methods included conducting a formal interview with patient, review of medical records, collaboration with medical staff, and obtaining relevant collateral information from family and community providers when available. Lorelei Ling MD          History of Present Illness:     Patient presented to the ED on 9/13/2024 for suicidal thoughts, anxiety, depression. Currently on a 72 hour hold, on commitment through [facility] on provisional discharge. He got triggered in a therapy group and started thinking about self harm. Main stressor is living on his own instead of in a group home, has been lonely. Cut himself in the arm with a steak knife x 2, denies was a suicide attempt. Said he had a plan to overdose and had the pills at home. Reports THC use, denies other drug use, denies ETOH use. UDS + THC. Pt is aware that plan is for admission and is ok with that.     Patient reports missing prolixin dec injections so plan was to stop prolixin and increase abilify with goal of switching to abilify maintena but this has not been done yet. Reports medication compliance aside from the prolixin dec. No other changes recently. Has been on Li for years, has been stable at current dose.       Reviewed DEC assessment and ED notes.             Psychiatric and AODA History:   Has natalia diagnosed with schizoaffective disorder, bipolar disorder, borderline pd, ptsd, adhd, polysubstance abuse, TBI in the past. Has case management, psychiatric provider, DBT therapist, [facility]  currently. Multiple hospitalizations, multiple suicide attempts. Has been on cymbalata, lamotrigine, fluoxetine, paxil, olanzapine, haldol, seroquel, trazodone, buspar, gabapentin, rexulti, geodon, adderall, xanax,  lunesta, intuniv, hydroxyzine, propranolol, prolixin, abiliy, lithium, vyvanse, ativan, menatonin, concerta, risperdal, ambien, strattera, stelazine, prazosin, trifluoperazine    History of polysubstance abuse including MDMA, opioids, THC, methamphetamine, THC. Treatment 7 years ago. Occasional alcohol use.         Past Medical and Surgical History:     PAST MEDICAL HISTORY:   Past Medical History:   Diagnosis Date    ADHD (attention deficit hyperactivity disorder)     Bipolar 1 disorder     Borderline personality disorder     Cauda equina syndrome     Chronic low back pain     Depression     Diabetes mellitus, type 2 (H) 1/19/2023    GERD (gastroesophageal reflux disease)     h/o TBI (traumatic brain injury)     Hypertension, unspecified type 12/16/2021    Marginal corneal ulcer, left 07/17/2015    Nephrolithiasis     obesity     Polysubstance abuse - methamphetamine, hallucinagen, heroin, marijuana     currently in remission    PONV (postoperative nausea and vomiting)     PTSD (post-traumatic stress disorder)        PAST SURGICAL HISTORY:   Past Surgical History:   Procedure Laterality Date    BACK SURGERY  12/24/2016    BACK SURGERY - For Cauda Equina Syndrome  12/24/2016    COLONOSCOPY      COMBINED CYSTOSCOPY, INSERT STENT URETER(S) Left 8/30/2018    Procedure: COMBINED CYSTOSCOPY, INSERT STENT URETER(S);  Cystoscopy With Left Stent Placement;  Surgeon: Kiran Ulrich MD;  Location: [facility] OR    COMBINED CYSTOSCOPY, RETROGRADES, EXCHANGE STENT URETER(S) Left 10/14/2018    Procedure: COMBINED CYSTOSCOPY, RETROGRADES, EXCHANGE STENT URETER(S);  Cystoscopy and removal of left-sided stent.;  Surgeon: Stiven Olivo MD;  Location:  OR    COMBINED CYSTOSCOPY, RETROGRADES, URETEROSCOPY, INSERT STENT  1/6/2014    Procedure: COMBINED CYSTOSCOPY, RETROGRADES, URETEROSCOPY, INSERT STENT;  Cystyoscopy place left ureteral stent;  Surgeon: Jaun Kimble MD;  Location: [facility] OR    COMBINED CYSTOSCOPY,  RETROGRADES, URETEROSCOPY, INSERT STENT Left 10/23/2018    Procedure: Video cystoscopy, left ureteroscopy with stone extraction;  Surgeon: Bull Mast MD;  Location:  OR    CYSTOSCOPY, URETEROSCOPY, COMBINED Right 9/23/2015    Procedure: COMBINED CYSTOSCOPY, URETEROSCOPY;  Surgeon: ROME Jett MD;  Location: [facility] OR    ENT SURGERY      ESOPHAGOSCOPY, GASTROSCOPY, DUODENOSCOPY (EGD), COMBINED  4/8/2013    Procedure: COMBINED ESOPHAGOSCOPY, GASTROSCOPY, DUODENOSCOPY (EGD), BIOPSY SINGLE OR MULTIPLE;  Gastroscopy;  Surgeon: Peter Pickard MD;  Location: [facility] GI    ESOPHAGOSCOPY, GASTROSCOPY, DUODENOSCOPY (EGD), COMBINED Left 10/28/2014    Procedure: COMBINED ESOPHAGOSCOPY, GASTROSCOPY, DUODENOSCOPY (EGD), BIOPSY SINGLE OR MULTIPLE;  Surgeon: Narcisa Ramirez MD;  Location:  OR    ESOPHAGOSCOPY, GASTROSCOPY, DUODENOSCOPY (EGD), COMBINED N/A 12/24/2018    Procedure: COMBINED ESOPHAGOSCOPY, GASTROSCOPY, DUODENOSCOPY (EGD), BIOPSY SINGLE OR MULTIPLE;  Surgeon: Sonu Verduzco MD;  Location: [facility] GI    INJECT EPIDURAL TRANSFORAMINAL LUMBAR / SACRAL EA ADDITIONAL LEVEL Left 3/18/2021    Procedure: Left L5/S1 transforaminal injection for selective L5 nerve root block;  Surgeon: Eliza Pagan MD;  [facility]    LAPAROSCOPIC CHOLECYSTECTOMY  11/20/2014    [facility], Dr. Ramirez    LASER HOLMIUM LITHOTRIPSY URETER(S), INSERT STENT, COMBINED  4/2/2014    Procedure: COMBINED CYSTOSCOPY, URETEROSCOPY, LASER HOLMIUM LITHOTRIPSY URETER(S), INSERT STENT;  Cystoscopy,Left Ureteral Stent Removal,Left Ureteroscopy with Laser Lithotripsy,Left Ureter Stent Placement;  Surgeon: ROME Jett MD;  Location: [facility] OR    Transurethral stone resection  03/11/2011                Pertinent Social and Family History:     Living in an apartment, recently moved from a group home. Unemployed, on disability          Allergies and Medications:     Allergies   Allergen Reactions    Haldol [Haloperidol] Other (See  Comments)     Makes patient very angry and anxious    Adhesive Tape Hives    Prednisone Other (See Comments) and Hives     Suicidal ideation    Droperidol Anxiety       Medications:     Current Facility-Administered Medications   Medication Dose Route Frequency Provider Last Rate Last Admin    amLODIPine (NORVASC) tablet 10 mg  10 mg Oral Daily Marco Dean MD   10 mg at 09/16/24 0909    ARIPiprazole (ABILIFY) tablet 10 mg  10 mg Oral Daily Mark Aponte MD   10 mg at 09/16/24 0908    clonazePAM (klonoPIN) half-tab 0.25 mg  0.25 mg Oral BID Marco Dean MD   0.25 mg at 09/16/24 0908    empagliflozin (JARDIANCE) tablet 10 mg  10 mg Oral Daily Mark Aponte MD   10 mg at 09/16/24 0910    gabapentin (NEURONTIN) tablet 1,200 mg  1,200 mg Oral BID Marco Dean MD   1,200 mg at 09/16/24 0908    gabapentin (NEURONTIN) tablet 600 mg  600 mg Oral Daily with supper Marco Dean MD   600 mg at 09/15/24 1703    hydrOXYzine HCl (ATARAX) tablet 25 mg  25 mg Oral Q6H PRN Mark Aponte MD   25 mg at 09/16/24 1054    lithium ER (LITHOBID) CR tablet 900 mg  900 mg Oral At Bedtime Marco Dean MD   900 mg at 09/15/24 2145    nicotine (NICODERM CQ) 21 MG/24HR 24 hr patch 1 patch  1 patch Transdermal Daily Hector Mendoza MD   1 patch at 09/16/24 0911    nicotine (NICORETTE) gum 2 mg  2 mg Buccal Q1H PRN Marco Dean MD   2 mg at 09/16/24 1327    OLANZapine zydis (zyPREXA) ODT tab 10 mg  10 mg Oral BID PRN Mark Aponte MD   10 mg at 09/15/24 1044    PARoxetine (PAXIL) tablet 10 mg  10 mg Oral Daily Marco Dean MD   10 mg at 09/16/24 0909    QUEtiapine (SEROquel) tablet 250 mg  250 mg Oral At Bedtime Marco Dean MD   250 mg at 09/15/24 2145    rosuvastatin (CRESTOR) tablet 40 mg  40 mg Oral Daily Marco Dean MD   40 mg at 09/16/24 0911    testosterone (ANDROGEL/TESTIM) 50 MG/5GM (1%) topical gel 100 mg of testosterone  100 mg of testosterone Transdermal  Daily Mark Aponte MD   100 mg of testosterone at 09/16/24 0910    valACYclovir (VALTREX) tablet 1,000 mg  1,000 mg Oral BID PRN Mark Aponte MD         Current Outpatient Medications   Medication Sig Dispense Refill    amLODIPine (NORVASC) 10 MG tablet Take 1 tablet (10 mg) by mouth daily 90 tablet 0    ARIPiprazole (ABILIFY) 5 MG tablet Take 1 tablet (5 mg) by mouth every morning (Patient taking differently: Take 10 mg by mouth every morning.) 30 tablet 0    clonazePAM (KLONOPIN) 0.5 MG tablet Take 0.5 tablets (0.25 mg) by mouth 2 times daily (Patient taking differently: Take 0.5 mg by mouth 2 times daily.) 30 tablet 0    empagliflozin (JARDIANCE) 10 MG TABS tablet Take 1 tablet (10 mg) by mouth daily 90 tablet 0    gabapentin (NEURONTIN) 600 MG tablet Take 2 tablets by mouth in the morning, 1 tablet in the afternoon, and 2 tablets in the evening (total 5 tablets daily). (Patient taking differently: Take 600-1,200 mg by mouth 3 times daily. Take 1200 mg by mouth in the morning, 600mg in the afternoon, and 1200mg in the evening (total 5 tablets daily).) 150 tablet 3    lithium (ESKALITH CR/LITHOBID) 450 MG CR tablet Take 2 tablets (900 mg) by mouth at bedtime 60 tablet 0    ondansetron (ZOFRAN ODT) 4 MG ODT tab Take 1 tablet (4 mg) by mouth every 8 hours as needed for nausea. 30 tablet 0    PARoxetine (PAXIL) 10 MG tablet Take 1 tablet (10 mg) by mouth daily 30 tablet 0    QUEtiapine (SEROQUEL) 200 MG tablet Take 1 tablet (200 mg) by mouth at bedtime (Patient taking differently: Take 200 mg by mouth at bedtime. With 50mg dose for a total of 250mg at bedtime) 30 tablet 2    QUEtiapine (SEROQUEL) 50 MG tablet Take 1 tablet (50 mg) by mouth at bedtime (Patient taking differently: Take 50 mg by mouth at bedtime. With 200mg dose for a total of 250mg at bedtime) 30 tablet 1    rosuvastatin (CRESTOR) 40 MG tablet Take 1 tablet (40 mg) by mouth daily 30 tablet 2    Semaglutide (RYBELSUS) 7 MG tablet Take 1 tablet (7  "mg) by mouth daily. 90 tablet 0    testosterone (ANDROGEL/TESTIM) 50 MG/5GM (1%) topical gel Place 2 packets (100 mg of testosterone) onto the skin daily 30 packet 2    valACYclovir (VALTREX) 1000 mg tablet Take 2 tablets (2,000 mg) by mouth 2 times daily (Patient taking differently: Take 2,000 mg by mouth 2 times daily as needed.) 8 tablet 2    ACE/ARB/ARNI NOT PRESCRIBED (INTENTIONAL) Please choose reason not prescribed from choices below.      blood glucose (NO BRAND SPECIFIED) test strip Use to test blood sugar one times daily and as needed. To accompany: Blood Glucose Monitor Brands: per insurance. 100 strip 3    insulin pen needle (32G X 4 MM) 32G X 4 MM miscellaneous Use 1 pen needles daily or as directed. 100 each 2    thin (NO BRAND SPECIFIED) lancets Use with lanceting device to check blood sugars once per day. To accompany: Blood Glucose Monitor Brands: per insurance. 100 each 2           Mental Status Exam:   Appearance:  awake, alert, adequately groomed, and casually dressed  Attitude:  cooperative  Eye Contact:  good  Mood:  depressed  Affect:  mood congruent and intensity is blunted  Speech:  clear, coherent  Psychomotor Behavior:  no evidence of tardive dyskinesia, dystonia, or tics  Thought Process:  logical, linear, and goal oriented  Associations:  no loose associations  Thought Content:  plan for suicide present, no auditory hallucinations present, and no visual hallucinations present  Insight:  limited  Judgment:  limited  Oriented to:  time, person, and place  Attention Span and Concentration:  intact  Recent and Remote Memory:  intact         Physical Exam:     BP (!) 137/93   Pulse 88   Temp 98.1  F (36.7  C) (Oral)   Resp 18   Ht 1.676 m (5' 6\")   LMP  (LMP Unknown)   SpO2 98%   BMI 36.49 kg/m    Weight is 0 lbs 0 oz 5' 6\" Body mass index is 36.49 kg/m .    Laboratory/Imaging:    Recent Results (from the past 72 hour(s))   EKG 12 lead    Collection Time: 09/13/24  2:52 PM   Result " Value Ref Range    Systolic Blood Pressure  mmHg    Diastolic Blood Pressure  mmHg    Ventricular Rate 89 BPM    Atrial Rate 89 BPM    MT Interval 172 ms    QRS Duration 96 ms     ms    QTc 452 ms    P Axis 25 degrees    R AXIS 7 degrees    T Axis 17 degrees    Interpretation ECG       Sinus rhythm with Premature ventricular complexes or Fusion complexes  Otherwise normal ECG  Unconfirmed report - interpretation of this ECG is computer generated - see medical record for final interpretation  Confirmed by - EMERGENCY ROOM, PHYSICIAN ([id number]),  SYDNEY VAUGHN ([id number]) on 9/15/2024 2:39:57 PM     HCG qualitative urine (UPT)    Collection Time: 09/14/24  7:41 AM   Result Value Ref Range    hCG Urine Qualitative Negative Negative   Urine Drug Screen Panel    Collection Time: 09/14/24  7:41 AM   Result Value Ref Range    Amphetamines Urine Screen Negative Screen Negative    Barbituates Urine Screen Negative Screen Negative    Benzodiazepine Urine Screen Negative Screen Negative    Cannabinoids Urine Screen Positive (A) Screen Negative    Cocaine Urine Screen Negative Screen Negative    Fentanyl Qual Urine Screen Negative Screen Negative    Opiates Urine Screen Negative Screen Negative    PCP Urine Screen Negative Screen Negative   Glucose by meter    Collection Time: 09/15/24  9:00 AM   Result Value Ref Range    GLUCOSE BY METER POCT 141 (H) 70 - 99 mg/dL   Glucose by meter    Collection Time: 09/16/24  9:22 AM   Result Value Ref Range    GLUCOSE BY METER POCT 153 (H) 70 - 99 mg/dL       ROS: 10 point ROS neg other than the symptoms noted above in the HPI.            Impression:   This patient is a 34 year old year old adult with a history of  multiple diagnoses including schizoaffective disorder, borderline pd, ptsd, TBI, substance abuse who presented to the ED on 9/13/2024  for suicidal thoughts with a plan to overdose. Had also engaged in cutting behavior which he denies was a suicide attempt. Lacerations  repaired. Was triggered by the content of a group discussion. Stressors include moving from a group home to an apartment and feeling lonely.  On commitment,  wanting to revoke conditional release if patient refuses care.          Diagnoses:   Principal Diagnosis: schizoaffective disorder, borderline pd    Secondary psychiatric diagnoses of concern this admission:  PTSD, TBI    Current medical diagnosis being treated:   Rusty ng         Recommendations:        Recommend acute inpatient stabilization, awaiting a bed  2.   Recommend starting continuing current psychiatric medications. Outpatient plan was for abilify          maintena, would consider if he remains in the ED.    .   Continue to consult psychiatry as needed.    Please call [facility]/DEC at [phone] if you have follow-up questions or wish to place another consult.         Attestation       I have provided critical care services at the bedside in the [facility] adult emergency department, evaluating the patient, reviewing notes and laboratory values and directing care. Spoke with [facility] about patient's care. Lorelei Ling MD September 16, 2024.    Disclaimer: This note consists of symbols derived from keyboarding, dictation, and/or voice recognition software. As a result, there may be errors in the script that have gone undetected.  Please consider this when interpreting information found in the chart.

## 2024-09-16 NOTE — ED NOTES
Pt c/o burning sensation/discomfort under tongue, believes it's r/t PRN Zyprexa ODT. Pt denied non ODT option and is requesting IM Zyprexa instead. 1x dose ordered and given.

## 2024-09-16 NOTE — PLAN OF CARE
09/16/24 1824   Patient Belongings   Did you bring any home meds/supplements to the hospital?  No   Patient Belongings locker   Patient Belongings Put in Hospital Secure Location (Security or Locker, etc.) cell phone/electronics;clothing   Belongings Search Yes   Clothing Search Yes   Second Staff Jessy BECKHAM     Goal Outcome Evaluation:    Cellphone, airpods, 2 boxer under wears, 2 shirts, 2 shorts, 1 sweat pant, vape, deck of cards, markers, crayola, 2 novels, folder with worksheets, books  Back pack: 6 reading books, Ipad, , pen, boxer, q-tips, ninetendo switch, coloring pencils and shading pencils    A               Admission:  I am responsible for any personal items that are not sent to the safe or pharmacy.  [facility] is not responsible for loss, theft or damage of any property in my possession.    Signature:  _________________________________ Date: _______  Time: _____                                              Staff Signature:  ____________________________ Date: ________  Time: _____      2nd Staff person, if patient is unable/unwilling to sign:    Signature: ________________________________ Date: ________  Time: _____     Discharge:  [facility] has returned all of my personal belongings:    Signature: _________________________________ Date: ________  Time: _____                                          Staff Signature:  ____________________________ Date: ________  Time: _____

## 2024-09-16 NOTE — TELEPHONE ENCOUNTER
3:49am -  Called [facility] and spoke with Cristina who states unit is short staffed. Pt will require SIO and would make them myrna x2 staff. Cannot admit tonight.     3:52am - Escalating pt in the queue. ANS also stated unit is down staff for the NOC shift. Pt will admit on day shift pending appropriate staffing. Escalation completed.    Pt will remain in queue for a day shift admission.

## 2024-09-17 LAB
GLUCOSE BLDC GLUCOMTR-MCNC: 134 MG/DL (ref 70–99)
GLUCOSE BLDC GLUCOMTR-MCNC: 95 MG/DL (ref 70–99)

## 2024-09-17 PROCEDURE — A9270 NON-COVERED ITEM OR SERVICE: HCPCS | Performed by: FAMILY MEDICINE

## 2024-09-17 PROCEDURE — 124N000002 HC R&B MH UMMC

## 2024-09-17 PROCEDURE — 250N000013 HC RX MED GY IP 250 OP 250 PS 637: Performed by: FAMILY MEDICINE

## 2024-09-17 PROCEDURE — 250N000013 HC RX MED GY IP 250 OP 250 PS 637: Performed by: PSYCHIATRY & NEUROLOGY

## 2024-09-17 PROCEDURE — 99223 1ST HOSP IP/OBS HIGH 75: CPT | Mod: AI | Performed by: PSYCHIATRY & NEUROLOGY

## 2024-09-17 PROCEDURE — 99222 1ST HOSP IP/OBS MODERATE 55: CPT

## 2024-09-17 PROCEDURE — 90832 PSYTX W PT 30 MINUTES: CPT | Performed by: COUNSELOR

## 2024-09-17 RX ORDER — AMLODIPINE BESYLATE 5 MG/1
10 TABLET ORAL DAILY
Status: DISCONTINUED | OUTPATIENT
Start: 2024-09-18 | End: 2024-09-24 | Stop reason: HOSPADM

## 2024-09-17 RX ORDER — GABAPENTIN 400 MG/1
1200 CAPSULE ORAL 2 TIMES DAILY
Status: DISCONTINUED | OUTPATIENT
Start: 2024-09-17 | End: 2024-09-24 | Stop reason: HOSPADM

## 2024-09-17 RX ORDER — ONDANSETRON 4 MG/1
4 TABLET, ORALLY DISINTEGRATING ORAL EVERY 8 HOURS PRN
Status: DISCONTINUED | OUTPATIENT
Start: 2024-09-17 | End: 2024-09-23

## 2024-09-17 RX ORDER — VALACYCLOVIR HYDROCHLORIDE 500 MG/1
2000 TABLET, FILM COATED ORAL 2 TIMES DAILY PRN
Status: DISCONTINUED | OUTPATIENT
Start: 2024-09-17 | End: 2024-09-24 | Stop reason: HOSPADM

## 2024-09-17 RX ORDER — PAROXETINE 10 MG/1
20 TABLET, FILM COATED ORAL DAILY
Status: DISCONTINUED | OUTPATIENT
Start: 2024-09-18 | End: 2024-09-24 | Stop reason: HOSPADM

## 2024-09-17 RX ORDER — NICOTINE POLACRILEX 4 MG
15-30 LOZENGE BUCCAL
Status: DISCONTINUED | OUTPATIENT
Start: 2024-09-17 | End: 2024-09-24 | Stop reason: HOSPADM

## 2024-09-17 RX ORDER — GINSENG 100 MG
CAPSULE ORAL 2 TIMES DAILY
Status: DISCONTINUED | OUTPATIENT
Start: 2024-09-17 | End: 2024-09-24 | Stop reason: HOSPADM

## 2024-09-17 RX ORDER — GABAPENTIN 400 MG/1
800 CAPSULE ORAL 3 TIMES DAILY
Status: DISCONTINUED | OUTPATIENT
Start: 2024-09-17 | End: 2024-09-17

## 2024-09-17 RX ORDER — CLONAZEPAM 0.5 MG/1
0.5 TABLET ORAL 2 TIMES DAILY PRN
Status: DISCONTINUED | OUTPATIENT
Start: 2024-09-17 | End: 2024-09-24 | Stop reason: HOSPADM

## 2024-09-17 RX ORDER — ARIPIPRAZOLE 10 MG/1
10 TABLET ORAL EVERY MORNING
Status: DISCONTINUED | OUTPATIENT
Start: 2024-09-18 | End: 2024-09-18

## 2024-09-17 RX ORDER — DEXTROSE MONOHYDRATE 25 G/50ML
25-50 INJECTION, SOLUTION INTRAVENOUS
Status: DISCONTINUED | OUTPATIENT
Start: 2024-09-17 | End: 2024-09-24 | Stop reason: HOSPADM

## 2024-09-17 RX ORDER — GABAPENTIN 600 MG/1
600 TABLET ORAL DAILY
Status: DISCONTINUED | OUTPATIENT
Start: 2024-09-17 | End: 2024-09-24 | Stop reason: HOSPADM

## 2024-09-17 RX ORDER — LITHIUM CARBONATE 450 MG
900 TABLET, EXTENDED RELEASE ORAL AT BEDTIME
Status: DISCONTINUED | OUTPATIENT
Start: 2024-09-17 | End: 2024-09-24 | Stop reason: HOSPADM

## 2024-09-17 RX ORDER — ROSUVASTATIN CALCIUM 20 MG/1
40 TABLET, COATED ORAL DAILY
Status: DISCONTINUED | OUTPATIENT
Start: 2024-09-17 | End: 2024-09-24 | Stop reason: HOSPADM

## 2024-09-17 RX ADMIN — TESTOSTERONE 100 MG OF TESTOSTERONE: 50 GEL TRANSDERMAL at 08:37

## 2024-09-17 RX ADMIN — CLONAZEPAM 0.5 MG: 0.5 TABLET ORAL at 11:43

## 2024-09-17 RX ADMIN — SENNOSIDES AND DOCUSATE SODIUM 1 TABLET: 8.6; 5 TABLET ORAL at 16:48

## 2024-09-17 RX ADMIN — NICOTINE 1 PATCH: 21 PATCH, EXTENDED RELEASE TRANSDERMAL at 08:37

## 2024-09-17 RX ADMIN — ROSUVASTATIN 40 MG: 20 TABLET, FILM COATED ORAL at 11:42

## 2024-09-17 RX ADMIN — ARIPIPRAZOLE 10 MG: 10 TABLET ORAL at 08:39

## 2024-09-17 RX ADMIN — NICOTINE POLACRILEX 2 MG: 2 GUM, CHEWING ORAL at 21:29

## 2024-09-17 RX ADMIN — GABAPENTIN 600 MG: 600 TABLET, FILM COATED ORAL at 14:47

## 2024-09-17 RX ADMIN — QUETIAPINE FUMARATE 250 MG: 200 TABLET ORAL at 21:58

## 2024-09-17 RX ADMIN — PAROXETINE HYDROCHLORIDE 10 MG: 10 TABLET, FILM COATED ORAL at 08:38

## 2024-09-17 RX ADMIN — GABAPENTIN 1200 MG: 600 TABLET, FILM COATED ORAL at 08:38

## 2024-09-17 RX ADMIN — NICOTINE POLACRILEX 2 MG: 2 GUM, CHEWING ORAL at 08:38

## 2024-09-17 RX ADMIN — AMLODIPINE BESYLATE 10 MG: 5 TABLET ORAL at 08:38

## 2024-09-17 RX ADMIN — NICOTINE POLACRILEX 2 MG: 2 GUM, CHEWING ORAL at 06:14

## 2024-09-17 RX ADMIN — NICOTINE POLACRILEX 2 MG: 2 GUM, CHEWING ORAL at 16:04

## 2024-09-17 RX ADMIN — NICOTINE POLACRILEX 2 MG: 2 GUM, CHEWING ORAL at 12:18

## 2024-09-17 RX ADMIN — GABAPENTIN 1200 MG: 400 CAPSULE ORAL at 21:58

## 2024-09-17 RX ADMIN — ACETAMINOPHEN 650 MG: 325 TABLET ORAL at 16:06

## 2024-09-17 RX ADMIN — NICOTINE POLACRILEX 2 MG: 2 GUM, CHEWING ORAL at 14:17

## 2024-09-17 RX ADMIN — CLONAZEPAM 0.25 MG: 0.5 TABLET ORAL at 08:38

## 2024-09-17 RX ADMIN — OLANZAPINE 10 MG: 10 TABLET, ORALLY DISINTEGRATING ORAL at 20:53

## 2024-09-17 RX ADMIN — LITHIUM CARBONATE 900 MG: 450 TABLET, EXTENDED RELEASE ORAL at 21:58

## 2024-09-17 RX ADMIN — NICOTINE POLACRILEX 2 MG: 2 GUM, CHEWING ORAL at 18:05

## 2024-09-17 RX ADMIN — ACETAMINOPHEN 650 MG: 325 TABLET ORAL at 06:34

## 2024-09-17 RX ADMIN — CLONAZEPAM 0.5 MG: 0.5 TABLET ORAL at 18:25

## 2024-09-17 RX ADMIN — EMPAGLIFLOZIN 10 MG: 10 TABLET, FILM COATED ORAL at 08:38

## 2024-09-17 ASSESSMENT — ACTIVITIES OF DAILY LIVING (ADL)
ADLS_ACUITY_SCORE: 53
LAUNDRY: WITH SUPERVISION
LAUNDRY: WITH SUPERVISION
ADLS_ACUITY_SCORE: 53
HYGIENE/GROOMING: INDEPENDENT
ADLS_ACUITY_SCORE: 53
DRESS: INDEPENDENT
ORAL_HYGIENE: INDEPENDENT
DRESS: INDEPENDENT
ADLS_ACUITY_SCORE: 53
HYGIENE/GROOMING: INDEPENDENT
ADLS_ACUITY_SCORE: 53
ORAL_HYGIENE: INDEPENDENT

## 2024-09-17 NOTE — PLAN OF CARE
Pt woke up around 0610 and immediately asked for nicotine gum. Pt given PRN 2 mg. Nicotine gum for nicotine cravings @ 0614. Pt then complained of a headache. Pt given  mg. Tylenol for headache @ 0634. Pt appeared to sleep for a total of 7 hours overnight. No other PRN medications were administered and no other concerns were noted.     Problem: Sleep Disturbance  Goal: Adequate Sleep/Rest  Outcome: Progressing
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
## 2024-09-17 NOTE — CONSULTS
[facility]  Internal Medicine Consult     Prakash Prasad MRN# [id number]   Age: 34 year old YOB: 1990     Date of Admission: 9/13/2024  Date of Consult: 9/17/2024    Primary Care Provider: Becki Avery    Requesting Service: Behavioral Health - Will Domingo DO  Reason for Consult: General Medical Evaluation      SUBJECTIVE   CC:   Lacerations itch   Assessment and Plan/Recommendations:     Prakash Prasad is a 34 year old man with a history of type 2 DM, female to 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, GERD, ADHD, bipolar 1 disorder, polysubstance abuse, PTSD, borderline personality disorder, TBI who presents to the emergency department via EMS for suicidal ideation and a puncture wound to his left arm. Medicine consulted 9/17/24 for diabetes management and assessment of self-injurious behavior.     Diabetes mellitus, type 2  Well controlled. Hb A1c 6.7 8/7/24. Current regimen: Jardiance and Rybelsus (semaglutide). Hx of being on long acting insulin.  He does not have means to get his Rybelsus brought into the hospital.  We discussed sliding scale insulin in place and patient would prefer not to have his blood sugars check that frequently.  He is requesting to substitute long-acting insulin for now.  -Blood glucose 3 times daily before meals  -Continue PTA Jardiance  -Substitute Lantus 5 mg in place of Rybelsus  -Hypoglycemia protocol    Laceration x 2 left lower arm  Self injury   Self punctured his left arm using a steak knife x 2. Came in with SI, but denies this was part of the suicide attempt. Laceration sutured in and repaired in the ED on 9/13. BG range 115-155.  Lacerations approximated with 1 stitch in each site.  No surrounding erythema or swelling.  No drainage noted.  -Bacitracin to lacerations twice daily  -Okay to leave open to air  -Remove sutures ~ 9/24 pending assessment     Medicine will continue to follow along.  Recommendations relayed to primary team via this progress  note.  Thank you for the opportunity to be involved in this patient's care.      BROOKLYN Bailey CNP  Internal Medicine [facility] Hospitalist  Securely message with the Vocera Web Console (learn more here)  Text paging via [employer] is appreciated  September 17, 2024         HPI:   Prakash Prasad is a 34 year old man with a history of type 2 DM, female to 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, GERD, ADHD, bipolar 1 disorder, polysubstance abuse, PTSD, borderline personality disorder, TBI who presents to the emergency department via EMS for suicidal ideation and a puncture wound to his left arm. Medicine consulted 9/17/24 for diabetes management and assessment of self-injurious behavior.   Met with patient in room, calm cooperative for assessment.  Lacerations on arms covered with Band-Aids.  Patient requesting to remove Band-Aids as they are causing itching.  Discussed with patient is okay to leave the lacerations open to air.  They are both approximated without erythema or swelling and 1 stitch each.  Patient agrees to applying bacitracin twice a day for now and will reassess in a week for suture removal.  Patient states he prefers to have his blood sugars checked before meals.  He would prefer not to go on a sliding scale insulin so he does not have the extra blood sugar checks at bedtime and 2 in the morning.  He is requesting long-acting insulin to be substituted for semaglutide as no one is available to bring it in.  Otherwise has no other questions or concerns for medicine at this time.       Past Medical History:     Past Medical History:   Diagnosis Date    ADHD (attention deficit hyperactivity disorder)     Bipolar 1 disorder     Borderline personality disorder     Cauda equina syndrome     Chronic low back pain     Depression     Diabetes mellitus, type 2 (H) 1/19/2023    GERD (gastroesophageal reflux disease)     h/o TBI (traumatic brain injury)     Hypertension, unspecified type 12/16/2021    Marginal corneal ulcer, left  07/17/2015    Nephrolithiasis     obesity     Polysubstance abuse - methamphetamine, hallucinagen, heroin, marijuana     currently in remission    PONV (postoperative nausea and vomiting)     PTSD (post-traumatic stress disorder)         Reviewed and updated in [facility].     Past Surgical History:      Past Surgical History:   Procedure Laterality Date    BACK SURGERY  12/24/2016    BACK SURGERY - For Cauda Equina Syndrome  12/24/2016    COLONOSCOPY      COMBINED CYSTOSCOPY, INSERT STENT URETER(S) Left 8/30/2018    Procedure: COMBINED CYSTOSCOPY, INSERT STENT URETER(S);  Cystoscopy With Left Stent Placement;  Surgeon: Kiran Ulrich MD;  Location: [facility] OR    COMBINED CYSTOSCOPY, RETROGRADES, EXCHANGE STENT URETER(S) Left 10/14/2018    Procedure: COMBINED CYSTOSCOPY, RETROGRADES, EXCHANGE STENT URETER(S);  Cystoscopy and removal of left-sided stent.;  Surgeon: Stiven Olivo MD;  Location:  OR    COMBINED CYSTOSCOPY, RETROGRADES, URETEROSCOPY, INSERT STENT  1/6/2014    Procedure: COMBINED CYSTOSCOPY, RETROGRADES, URETEROSCOPY, INSERT STENT;  Cystyoscopy place left ureteral stent;  Surgeon: Jaun Kimble MD;  Location: [facility] OR    COMBINED CYSTOSCOPY, RETROGRADES, URETEROSCOPY, INSERT STENT Left 10/23/2018    Procedure: Video cystoscopy, left ureteroscopy with stone extraction;  Surgeon: Bull Mast MD;  Location:  OR    CYSTOSCOPY, URETEROSCOPY, COMBINED Right 9/23/2015    Procedure: COMBINED CYSTOSCOPY, URETEROSCOPY;  Surgeon: ROME Jett MD;  Location: [facility] OR    ENT SURGERY      ESOPHAGOSCOPY, GASTROSCOPY, DUODENOSCOPY (EGD), COMBINED  4/8/2013    Procedure: COMBINED ESOPHAGOSCOPY, GASTROSCOPY, DUODENOSCOPY (EGD), BIOPSY SINGLE OR MULTIPLE;  Gastroscopy;  Surgeon: Peter Pickard MD;  Location: [facility] GI    ESOPHAGOSCOPY, GASTROSCOPY, DUODENOSCOPY (EGD), COMBINED Left 10/28/2014    Procedure: COMBINED ESOPHAGOSCOPY, GASTROSCOPY, DUODENOSCOPY (EGD), BIOPSY SINGLE OR MULTIPLE;   "Surgeon: Narcisa Ramirez MD;  Location:  OR    ESOPHAGOSCOPY, GASTROSCOPY, DUODENOSCOPY (EGD), COMBINED N/A 2018    Procedure: COMBINED ESOPHAGOSCOPY, GASTROSCOPY, DUODENOSCOPY (EGD), BIOPSY SINGLE OR MULTIPLE;  Surgeon: Sonu Verduzco MD;  Location: [facility] GI    INJECT EPIDURAL TRANSFORAMINAL LUMBAR / SACRAL EA ADDITIONAL LEVEL Left 3/18/2021    Procedure: Left L5/S1 transforaminal injection for selective L5 nerve root block;  Surgeon: Eliza Pagan MD;  [place] [facility] [place]    LAPAROSCOPIC CHOLECYSTECTOMY  2014    [facility], Dr. Ramirez    LASER HOLMIUM LITHOTRIPSY URETER(S), INSERT STENT, COMBINED  2014    Procedure: COMBINED CYSTOSCOPY, URETEROSCOPY, LASER HOLMIUM LITHOTRIPSY URETER(S), INSERT STENT;  Cystoscopy,Left Ureteral Stent Removal,Left Ureteroscopy with Laser Lithotripsy,Left Ureter Stent Placement;  Surgeon: ROME Jett MD;  Location: [facility] OR    Transurethral stone resection  2011         Social History:     Social History     Tobacco Use    Smoking status: Former     Current packs/day: 0.00     Average packs/day: 0.5 packs/day for 11.1 years (5.6 ttl pk-yrs)     Types: Other, Cigarettes     Start date: 2011     Quit date: 2022     Years since quittin.0     Passive exposure: Never    Smokeless tobacco: Former     Types: Chew     Quit date: 2019    Tobacco comments:     Just nicotine gum currently. 23   Vaping Use    Vaping status: Every Day    Substances: Nicotine, THC, Flavoring    Devices: Disposable   Substance Use Topics    Alcohol use: Yes    Drug use: Not Currently     Types: Marijuana, Opiates     Comment: denies using oxy or other opiates_\"not for years\"        Family History:     Family History   Problem Relation Age of Onset    Hyperlipidemia Mother     Mental Illness Mother     Anxiety Disorder Mother     Anesthesia Reaction Mother         Vomiting/Nausea    Other Cancer Mother     Skin Cancer Mother     Gastrointestinal Disease Father  " "       Crohn's Disease    Cancer Father         Liver Cancer    Other Cancer Father         Liver    Obesity Father     Skin Cancer Father     No Known Problems Sister     Hypertension Brother     Other Cancer Brother         Esophagecial    Diabetes Brother     Obesity Brother     Hypertension Brother     Other Cancer Brother     Cancer Maternal Grandmother         lympoma    Diabetes Maternal Grandmother     Mental Illness Maternal Grandmother     Other Cancer Maternal Grandmother         Non Hodgkins Lymphoma    Diabetes Maternal Grandfather     Hypertension Maternal Grandfather     Prostate Cancer Maternal Grandfather     Hyperlipidemia Maternal Grandfather     Heart Disease Paternal Grandfather         heart disease    Other Cancer Paternal Half-Brother          Allergies:     Allergies   Allergen Reactions    Haldol [Haloperidol] Other (See Comments)     Makes patient very angry and anxious    Adhesive Tape Hives    Prednisone Other (See Comments) and Hives     Suicidal ideation    Droperidol Anxiety         Medications:   Reviewed. Please see MAR     Review of Systems:   10 point ROS of systems including Constitutional, Eyes, Respiratory, Cardiovascular, Gastroenterology, Genitourinary, Integumentary, Muscularskeletal, Psychiatric were all negative except for pertinent positives noted in my HPI.    OBJECTIVE   Physical Exam:   Vitals were reviewed  Blood pressure (!) 146/93, pulse 103, temperature 97.9  F (36.6  C), temperature source Oral, resp. rate 18, height 1.676 m (5' 6\"), weight 101.1 kg (222 lb 12.8 oz), SpO2 95%, not currently breastfeeding.  General: Alert and oriented x3, well appearing   HEENT: Anicteric sclera, MMM  Cardiovascular: RRR, S1S2. No murmur noted  Lungs: CTAB without wheezing or crackles   GI: Abdomen soft, non-tender with bowel sounds present. No guarding or rebound   Vascular: No peripheral edema, distal pulses palpable  Neurologic: No focal deficits, CN II-XII grossly intact  Skin: " No jaundice, rashes, or lesions Laceration x 2 on L forearm        Data:        Lab Results   Component Value Date     2024     2021    Lab Results   Component Value Date    CHLORIDE 107 2024    CHLORIDE 108 2023    CHLORIDE 113 2021    Lab Results   Component Value Date    BUN 9.2 2024    BUN 13 2023    BUN 11 2021      Lab Results   Component Value Date    POTASSIUM 3.9 2024    POTASSIUM 3.9 2023    POTASSIUM 4.0 2021    Lab Results   Component Value Date    CO2 22 2024    CO2 22 2023    CO2 19 2021    Lab Results   Component Value Date    CR 0.80 2024    CR 0.81 2021        Lab Results   Component Value Date    WBC 11.3 (H) 2024    HGB 15.8 2024    HCT 47.4 2024    MCV 85 2024     2024     Lab Results   Component Value Date    WBC 11.3 (H) 2024           Medical Decision Makin MINUTES SPENT BY ME on the date of service doing chart review, history, exam, documentation & further activities per the note.
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
## 2024-09-17 NOTE — PLAN OF CARE
Goal Outcome Evaluation:    IP Treatment Plan    Client's Name: Prakash Prasad  YOB: 1990      Treatment Plan Date: September 17, 2024  Goal mental stability - urge surf through SI and SIB    Anticipated number of sessions for this episode of care: 2    Current Concerns/Problem Areas:    Goal 1: Identify behaviors that sabotage growth and incorporate healthier behaviors  Pt will decrease suicidal ideation evidenced by report of fewer intrusive suicidal thoughts (decreased ...%) and 50% less intent Pt will decrease reports of SI from 4 to 0 a day      Intervention(s)    Engaged in safety planning identifying coping skills, warning signs, health support and resources and Explored motivation for behavioral change

## 2024-09-17 NOTE — PLAN OF CARE
BEH IP Unit Acuity Rating Score (UARS)  Patient is given one point for every criteria they meet.    CRITERIA SCORING   On a 72 hour hold, court hold, committed, stay of commitment, or revocation. 1    Patient LOS on BEH unit exceeds 20 days. 0  LOS: 1   Patient under guardianship, 55+, otherwise medically complex, or under age 11. 1   Suicide ideation without relief of precipitating factors. 0   Current plan for suicide. 0   Current plan for homicide. 0   Imminent risk or actual attempt to seriously harm another without relief of factors precipitating the attempt. 0   Severe dysfunction in daily living (ex: complete neglect for self care, extreme disruption in vegetative function, extreme deterioration in social interactions). 0   Recent (last 7 days) or current physical aggression in the ED or on unit. 0   Restraints or seclusion episode in past 72 hours. 0   Recent (last 7 days) or current verbal aggression, agitation, yelling, etc., while in the ED or unit. 0   Active psychosis. 0   Need for constant or near constant redirection (from leaving, from others, etc).  0   Intrusive or disruptive behaviors. 0   Patient requires 3 or more hours of individualized nursing care per 8-hour shift (i.e. for ADLs, meds, therapeutic interventions). 0   TOTAL 2

## 2024-09-17 NOTE — PLAN OF CARE
"Patient arrived  9/16/2024     [facility] Admitting  DX: Suicidal ideation [R45.851]  Schizoaffective disorder, bipolar type (H) [F25.0]  Self-injurious behavior [Z72.89]    Vital signs reviewed related to admission.  BP (!) 146/93   Pulse 103   Temp 98.1  F (36.7  C) (Oral)   Resp 18   Ht 1.676 m (5' 6\")   Wt 101 kg (222 lb 9.6 oz)   LMP  (LMP Unknown)   SpO2 97%   BMI 35.93 kg/m  .    Patient arrived on the unit and was cooperative with the clothing search and admission profile interview. States that he is experiencing command hallucinations to harm themselves by overdosing. Continues to have fleeting thoughts of SI. Was placed on a SIO for SIB. On his left forearm has sutures that was re-bandaged in the ED. Stiches were placed on 9/13/24    States that he recently moved from a group home into there own apartment. States that this has been a stressor for them. Reports being depressed 10/10. Reports being anxious 8/10. States that there sleep is poor and only gets about 3 hours per night.     Declined there dinner tray. Med compliant. Affect has been flat and tense. Requested prn zyprexa for agitation. Had some visitors which he said improved his mood. Inquired about a service dog coming on the unit.     Patient is on a 72 hour hold      "

## 2024-09-17 NOTE — TELEPHONE ENCOUNTER
Action September 17, 2024 3:16 PM MT   Action Taken Sent a request for imaging from [facility].       DIAGNOSIS: LOW BACK PAIN   APPOINTMENT DATE: 09/20/2024   NOTES STATUS DETAILS   OFFICE NOTE from referring provider Internal 08/22/2024 - Becki Avery APRN [facility]  [facility] Medicine   DISCHARGE SUMMARY from hospital Internal 09/13/2024 - [facility]     CT SCAN PACS [facility]:  08/03/2024 - Abd/Pel    Internal:  01/16/2022 - Abd/Pel

## 2024-09-17 NOTE — PLAN OF CARE
Patient alert and fully oriented. Affect tense, restricted. Mood highly anxious and depressed. He endorsed active thoughts and urges to self-harm today and active thoughts of wanting to kill himself by hanging or overdose. He is currently on an SIO for safety due to these thoughts and urges, but has not acted on them and agreed to talk with staff about them to get help with coping alternatives. Dressing to left forearm clean, dry and intact. Patient requested internal medicine consult for addressing alternative to non-formulary diabetes management while in hospital and also for guidance on when stitches to left arm need removal. He requested for emotional service dog to visit and order placed.   He reported poor sleep overnight. Appetite intact. He reported self-harm prior to admission was triggered by a phone conversation with outpatient  letting him know that they planned to revoke his stay of committment. Prn klonopin given at 1145 for anxiety with moderate/partial effect. Offered copy of 72 hold paperwork, however patient stated he received this in the emergency room when hold was placed. Gabapentin dosing changed from 1200 mg BID and 600 mg in the afternoon to 800 mg TID. Patient requested it be changed back to PTA dosing. Dr. Kruger paged to inform and 1400 dose held until this was resolved. Dressing to left forearm changed and bacitracin applied to two small wounds with one stitch each.       Problem: Suicide Risk  Goal: Absence of Self-Harm  Outcome: Not Progressing   Goal Outcome Evaluation:    Plan of Care Reviewed With: patient

## 2024-09-17 NOTE — PLAN OF CARE
"Goal Outcome Evaluation:    Individual Therapy Note      Date of Service: September 17, 2024    Patient: Prakash goes by \"Prakash,\" uses he/him pronouns    Individuals Present: Prakash NATHAN Gonzalez    Session start: 11:00 am  Session end: 11:30 am  Session duration in minutes: 30      Modality Used: DBT, Person Centered, and Rapport Building    Goals: DBT and Visual feelings exploration to address impulsivity Si and SIB    Patient Description of current symptoms: distressed about many return visits to hospital, impulsive about \" relief from SI, knows to use DBT skills but in the moment, doesn't use them. Also would like to work with identifying feelings for urge surfing through writing, journaling, art on the unit  Writer has some community Art Therapy resources in [place] that may be helpful.     Mental Status Exam:   Attitude: evasive, guarded, and somewhat cooperative  Eye Contact: fair  Mood: anxious  Affect: intensity is blunted and intensity is flat  Speech: paucity of speech  Psychomotor Behavior: fidgeting  Thought Process:  linear  Associations: no loose associations  Thought Content: auditory hallucinations present  Insight: limited  Judgement: poor  Attention Span and Concentration: fair    Pt progress: Pt is feeling disappointed in [facility] for being re hospitalized but did acknowledge 5 hospitalizations last year and only 2 this year, doing better and trying to acknowledge that .Wants to work on DBT homework on the unit    Treatment Objective(s) Addressed:   The focus of this session was on rapport building, orienting the patient to therapy, processing feelings related to Si, stabbing self and needing stitches, safety planning, building distress tolerance, building self-esteem, and identifying additional supports     Progress Towards Goals and Assessment of Patient:   Patient is making progress towards treatment goals as evidenced by willingness to engage and to try to use creative and DBT skills. " Has shame and says male voices telling him to kill himself but does have some hope things can get better with support and skills      Therapeutic Intervention(s):   Provided active listening, unconditional positive regard, and validation.   Engaged in safety planning identifying coping skills, warning signs, health support and resources, Explored motivation for behavioral change, Discussed TIPP (body temperature, intense exercise, PMR), Explored strategies for self-soothing, Reviewed healthy living that supports positive mental health, including looking at sleep hygiene, regular movement, nutrition, and regular socialization, Introduced and practiced Wise Mind Introduced and practiced urge surfing, and Completed behavior chain analysis      Plan/next step: will provide some art activities on the unit, will meet with this pt regularly and provide ideas for outpatient art therapy resources to [facility]    [address] - Psychotherapy (with patient) - 30 (16-37*) min    Patient Active Problem List   Diagnosis    ADHD (attention deficit hyperactivity disorder)    Bipolar 1 disorder, manic, mild    Marijuana abuse    Polysubstance abuse (H)    GERD (gastroesophageal reflux disease)    Tobacco abuse    Intractable back pain    Optic neuritis    Cauda equina syndrome with neurogenic bladder (H)    Schizoaffective disorder, bipolar type (H)    PTSD (post-traumatic stress disorder)    Anxiety    Auditory hallucination    Nephrolithiasis    Cyst of left ovary    Borderline personality disorder (H)    Cannabis use disorder, severe, dependence (H)    Depression    Episodic mood disorder (H24)    History of heroin abuse (H)    Moderate episode of recurrent major depressive disorder (H)    Opioid use disorder, severe, dependence (H)    Substance-induced psychotic disorder with hallucinations (H)    Nausea    Urinary retention    Chronic bilateral low back pain without sciatica    AVA (generalized anxiety disorder)    Aggressive behavior     Lumbosacral radiculopathy at L5    Abnormal uterine bleeding    Acanthosis nigricans    Schizoaffective disorder, chronic condition with acute exacerbation (H)    Bipolar affective disorder, mixed, severe, with psychotic behavior (H)    Schizophrenia, schizoaffective, chronic with acute exacerbation (H)    Akathisia    Hypertension, unspecified type    Female-to-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 person    Morbid obesity (H)    Mood disorder due to a general medical condition    Pain of right hand    Acne vulgaris    Type 2 diabetes mellitus with hyperglycemia, with long-term current use of insulin (H)    Herpes labialis    Major depressive disorder, recurrent severe without psychotic features (H)

## 2024-09-18 LAB
GLUCOSE BLDC GLUCOMTR-MCNC: 106 MG/DL (ref 70–99)
GLUCOSE BLDC GLUCOMTR-MCNC: 123 MG/DL (ref 70–99)
GLUCOSE BLDC GLUCOMTR-MCNC: 199 MG/DL (ref 70–99)

## 2024-09-18 PROCEDURE — 250N000013 HC RX MED GY IP 250 OP 250 PS 637: Performed by: PSYCHIATRY & NEUROLOGY

## 2024-09-18 PROCEDURE — 250N000012 HC RX MED GY IP 250 OP 636 PS 637

## 2024-09-18 PROCEDURE — 250N000013 HC RX MED GY IP 250 OP 250 PS 637: Performed by: FAMILY MEDICINE

## 2024-09-18 PROCEDURE — A9270 NON-COVERED ITEM OR SERVICE: HCPCS | Performed by: FAMILY MEDICINE

## 2024-09-18 PROCEDURE — 99233 SBSQ HOSP IP/OBS HIGH 50: CPT | Performed by: PSYCHIATRY & NEUROLOGY

## 2024-09-18 PROCEDURE — 250N000009 HC RX 250

## 2024-09-18 PROCEDURE — 250N000011 HC RX IP 250 OP 636: Performed by: PSYCHIATRY & NEUROLOGY

## 2024-09-18 PROCEDURE — 99232 SBSQ HOSP IP/OBS MODERATE 35: CPT

## 2024-09-18 PROCEDURE — 124N000002 HC R&B MH UMMC

## 2024-09-18 RX ADMIN — ROSUVASTATIN 40 MG: 20 TABLET, FILM COATED ORAL at 08:26

## 2024-09-18 RX ADMIN — TESTOSTERONE 100 MG OF TESTOSTERONE: 50 GEL TRANSDERMAL at 10:45

## 2024-09-18 RX ADMIN — NICOTINE POLACRILEX 2 MG: 2 GUM, CHEWING ORAL at 12:12

## 2024-09-18 RX ADMIN — LITHIUM CARBONATE 900 MG: 450 TABLET, EXTENDED RELEASE ORAL at 23:01

## 2024-09-18 RX ADMIN — NICOTINE 1 PATCH: 21 PATCH, EXTENDED RELEASE TRANSDERMAL at 08:20

## 2024-09-18 RX ADMIN — EMPAGLIFLOZIN 10 MG: 10 TABLET, FILM COATED ORAL at 08:20

## 2024-09-18 RX ADMIN — NICOTINE POLACRILEX 2 MG: 2 GUM, CHEWING ORAL at 07:12

## 2024-09-18 RX ADMIN — SENNOSIDES AND DOCUSATE SODIUM 1 TABLET: 8.6; 5 TABLET ORAL at 16:21

## 2024-09-18 RX ADMIN — GABAPENTIN 1200 MG: 400 CAPSULE ORAL at 08:19

## 2024-09-18 RX ADMIN — PAROXETINE 20 MG: 10 TABLET, FILM COATED ORAL at 08:19

## 2024-09-18 RX ADMIN — NICOTINE POLACRILEX 2 MG: 2 GUM, CHEWING ORAL at 09:46

## 2024-09-18 RX ADMIN — NICOTINE POLACRILEX 2 MG: 2 GUM, CHEWING ORAL at 20:22

## 2024-09-18 RX ADMIN — CLONAZEPAM 0.5 MG: 0.5 TABLET ORAL at 13:42

## 2024-09-18 RX ADMIN — QUETIAPINE FUMARATE 250 MG: 200 TABLET ORAL at 23:01

## 2024-09-18 RX ADMIN — BACITRACIN: 500 OINTMENT TOPICAL at 10:43

## 2024-09-18 RX ADMIN — ACETAMINOPHEN 650 MG: 325 TABLET ORAL at 07:53

## 2024-09-18 RX ADMIN — NICOTINE POLACRILEX 2 MG: 2 GUM, CHEWING ORAL at 17:35

## 2024-09-18 RX ADMIN — NICOTINE POLACRILEX 2 MG: 2 GUM, CHEWING ORAL at 14:14

## 2024-09-18 RX ADMIN — AMLODIPINE BESYLATE 10 MG: 5 TABLET ORAL at 08:19

## 2024-09-18 RX ADMIN — HYDROXYZINE HYDROCHLORIDE 25 MG: 25 TABLET, FILM COATED ORAL at 09:46

## 2024-09-18 RX ADMIN — ONDANSETRON 4 MG: 4 TABLET, ORALLY DISINTEGRATING ORAL at 12:31

## 2024-09-18 RX ADMIN — ARIPIPRAZOLE 10 MG: 10 TABLET ORAL at 08:19

## 2024-09-18 RX ADMIN — GABAPENTIN 1200 MG: 400 CAPSULE ORAL at 23:01

## 2024-09-18 RX ADMIN — GABAPENTIN 600 MG: 600 TABLET, FILM COATED ORAL at 13:42

## 2024-09-18 RX ADMIN — ACETAMINOPHEN 650 MG: 325 TABLET ORAL at 17:36

## 2024-09-18 RX ADMIN — INSULIN GLARGINE 5 UNITS: 100 INJECTION, SOLUTION SUBCUTANEOUS at 08:26

## 2024-09-18 RX ADMIN — CLONAZEPAM 0.5 MG: 0.5 TABLET ORAL at 20:21

## 2024-09-18 RX ADMIN — OLANZAPINE 10 MG: 10 TABLET, ORALLY DISINTEGRATING ORAL at 16:06

## 2024-09-18 RX ADMIN — NICOTINE POLACRILEX 2 MG: 2 GUM, CHEWING ORAL at 06:00

## 2024-09-18 ASSESSMENT — ACTIVITIES OF DAILY LIVING (ADL)
ADLS_ACUITY_SCORE: 53
ADLS_ACUITY_SCORE: 53
HYGIENE/GROOMING: INDEPENDENT
ADLS_ACUITY_SCORE: 53
ADLS_ACUITY_SCORE: 53
DRESS: INDEPENDENT
DRESS: INDEPENDENT
ADLS_ACUITY_SCORE: 53
LAUNDRY: WITH SUPERVISION
ADLS_ACUITY_SCORE: 53
LAUNDRY: WITH SUPERVISION
ADLS_ACUITY_SCORE: 53
HYGIENE/GROOMING: INDEPENDENT
ADLS_ACUITY_SCORE: 53
ORAL_HYGIENE: INDEPENDENT
ADLS_ACUITY_SCORE: 53
ORAL_HYGIENE: INDEPENDENT
ADLS_ACUITY_SCORE: 53

## 2024-09-18 NOTE — PROGRESS NOTES
Patient is currently on a SIO. Began to head bang approximately 3-4 while lying in bed in a backward motion before his staff intervened and encouraged him to come out of his room. States that the voices are telling him to hurt himself. Requested prn zyprexa. States that he has a headache. Also states that his ears hurt and is having difficulty urinating. States that his bladder is full but hasn't been able to urinate for the last couple hours. Provider notified. Will monitor and notify IM if not resolved.

## 2024-09-18 NOTE — PROGRESS NOTES
On call provider called, notifying them that patient's symptoms had not resolved. Asked for them to place a stat IM order. Patient stating that they are having pain from not being able to urinate. Took tylenol for head and ear pain.

## 2024-09-18 NOTE — PLAN OF CARE
\\Team Note Due:  Tuesday     Assessment/Intervention/Current Symtoms and Care Coordination:  Chart review and met with team, discussed pt progress, symptomology, and response to treatment.  Discussed the discharge plan and any potential impediments to discharge.    Pt is on a 1:1.  Pt was visited by his .  Pt's acces to service dog is being reviewed.     Discharge Plan or Goal:  Return home to [facility] apartment type.     Barriers to Discharge:  PD is being revoked       Referral Status:    Psychiatrist/Medication Management:  Name/Organization: Regine Last, CNP, APRN [facility]  Phone: [phone]  Fax: [phone]     :  Name/Organization: Cristy Ji  [facility] - VAL signed  Phone: [phone]     Group Home:  Name/Organization: [facility] [facility] [place] Director  [facility] - VAL signed  Phone: [phone]     ARMHS:  Name/Organization: Deena Ibarra - VAL signed  Phone: [phone]      CADI Worker:  Name/Organization: Lydia Britt  [facility]  Phone: [phone]         MARK Hampton  Other contact information (family, friends, SO) and VAL status:   Pt reports there isn obody to sign ROIs.           Legal Status:  72 hour hold  Ends 6/19/24 @ 1755     [facility]: [place]  File Number:   Start and expiration date of commitment: ends December 2024      Contacts (include VAL status):          Upcoming Meetings and Dates/Important Information and next steps:

## 2024-09-18 NOTE — PLAN OF CARE
Problem: Suicide Risk  Goal: Absence of Self-Harm  Outcome: Not Progressing    Pt is currently on an SIO for SI/SIB thoughts. Pt told writer early in the shift and said that if he was outside of the hospital he would kill himself. Gave no plan. Said he had passive SI in the hospital without a plan and said that if SI feelings became too intense he felt comfortable coming to staff. Anxious throughout the day, bouncing his legs and looking stressed. Said there was too much intense energy on the unit and it was raising his anxiety to a 9. He took Hydroxyzine 25 mg, prn at 0945 with poor results. He took Klonopin, 0.5 mg, prn at 1342. He said this did reduced his anxiety. Says this afternoon voices are his problem. They are telling him to pull his stiches out or run. Feels in control of these with coping skill.     Blood sugars were 199 at breakfast and 123 at lunch. Dressing changed to left fore stitches. There is no drainage or redness. Ate breakfast but not lunch.

## 2024-09-18 NOTE — PROGRESS NOTES
"[facility] [place]   Psychiatric Progress Note        Interim History:   The patient's care was discussed with the treatment team during the daily team meeting and/or staff's chart notes were reviewed.  Staff report patient slept for about 6.5 hours last night. He was continued on SIO because of continuous reports of having command Auditory hallucinations telling him to kill and harm self. Was seen by IM who provided following recommendations (appreciate IM help): -Blood glucose 3 times daily before meals  -Continue PTA Jardiance  -Substitute Lantus 5 mg in place of Rybelsus  -Hypoglycemia protocol;   -Bacitracin to lacerations twice daily  -Okay to leave open to air  -Remove sutures ~ 9/24 pending assessment    Prakash didn't go to any groups, he was reported to be flat but cooperative and polite, see RN's note below:    \"Patient has spent time going in between there room and the milieu. Did not attend the evening group. Had a visitor which went well. Continues to report having self harm and suicidal thoughts. States that he is experiencing anxiety and depression. Voices are telling him to harm himself. Requested prn klonopin and zyprexa for voices and agitation. Requested prn senna, states he hasn't had a bowl movement since the weekend. Requested prn tylenol for a headache. Affect is flat and appears distracted. Colored and talked on the phone to distract himself. Affect is flat and guarded. Med compliant. Ate dinner and snack.      Patient evaluation continues. Assessed mood,anxiety,thoughts and behavior.      Patient gradually progressing towards goals.     Patient is encouraged to participate in groups and assisted to develop healthy coping skills.      VS reviewed: /80 (BP Location: Right arm)   Pulse 99   Temp 98.7  F (37.1  C) (Temporal)   Resp 18   Ht 1.676 m (5' 6\")   Wt 101.1 kg (222 lb 12.8 oz)   LMP  (LMP Unknown)   SpO2 95%   BMI 35.96 kg/m \"      Met with " patient: he was seen with his SIO staff in the conference room. He went there willingly and participated in conversation. Confirmed that he was seen by IM. Reported above mentioned Auditory hallucinations, Suicidal ideation and thoughts of doing self harm, but also said that with SIO staff he felt safe and could contract for safety. We discussed increase in Abilify dose and Prakash agreed with that. He also agreed with rechecking his Li level tomorrow am and had no more questions or concerns.          Medications:     Current Facility-Administered Medications   Medication Dose Route Frequency Provider Last Rate Last Admin    amLODIPine (NORVASC) tablet 10 mg  10 mg Oral Daily Cortez Kruger MD   10 mg at 09/18/24 0819    [START ON 9/19/2024] ARIPiprazole (ABILIFY) half-tab 15 mg  15 mg Oral QAM Cortez Kruger MD        bacitracin ointment   Topical BID Ashlyn Linton APRN CNP   Given at 09/18/24 1043    empagliflozin (JARDIANCE) tablet 10 mg  10 mg Oral Daily Mark Aponte MD   10 mg at 09/18/24 0820    gabapentin (NEURONTIN) capsule 1,200 mg  1,200 mg Oral BID Cortez Kruger MD   1,200 mg at 09/18/24 0819    gabapentin (NEURONTIN) tablet 600 mg  600 mg Oral Daily Cortez Kruger MD   600 mg at 09/18/24 1342    insulin glargine (LANTUS PEN) injection 5 Units  5 Units Subcutaneous QAM AC Ashlyn Linton APRN CNP   5 Units at 09/18/24 0826    lithium (ESKALITH CR/LITHOBID) CR tablet 900 mg  900 mg Oral At Bedtime Cortez Kruger MD   900 mg at 09/17/24 2158    nicotine (NICODERM CQ) 21 MG/24HR 24 hr patch 1 patch  1 patch Transdermal Daily Cortez Kruger MD   1 patch at 09/18/24 0820    PARoxetine (PAXIL) tablet 20 mg  20 mg Oral Daily Cortez Kruger MD   20 mg at 09/18/24 0819    QUEtiapine (SEROquel) tablet 250 mg  250 mg Oral At Bedtime Cortez Kruger MD   250 mg at 09/17/24 2158    rosuvastatin (CRESTOR) tablet 40 mg  40 mg Oral Daily Saskia,  "Cortez TOM MD   40 mg at 09/18/24 0826    testosterone (ANDROGEL/TESTIM) 50 MG/5GM (1%) topical gel 100 mg of testosterone  100 mg of testosterone Transdermal Daily Mark Aponte MD   100 mg of testosterone at 09/18/24 1045          Allergies:     Allergies   Allergen Reactions    Haldol [Haloperidol] Other (See Comments)     Makes patient very angry and anxious    Adhesive Tape Hives    Prednisone Other (See Comments) and Hives     Suicidal ideation    Droperidol Anxiety          Labs:     Recent Results (from the past 24 hour(s))   Glucose by meter    Collection Time: 09/17/24  5:24 PM   Result Value Ref Range    GLUCOSE BY METER POCT 95 70 - 99 mg/dL   Glucose by meter    Collection Time: 09/18/24  8:11 AM   Result Value Ref Range    GLUCOSE BY METER POCT 199 (H) 70 - 99 mg/dL   Glucose by meter    Collection Time: 09/18/24 12:27 PM   Result Value Ref Range    GLUCOSE BY METER POCT 123 (H) 70 - 99 mg/dL          Psychiatric Examination:     /89   Pulse 85   Temp 98.2  F (36.8  C) (Temporal)   Resp 18   Ht 1.676 m (5' 6\")   Wt 101.1 kg (222 lb 12.8 oz)   LMP  (LMP Unknown)   SpO2 98%   BMI 35.96 kg/m    Weight is 222 lbs 12.8 oz  Body mass index is 35.96 kg/m .    Orthostatic Vitals         Most Recent      Sitting Orthostatic /89 09/18 0850    Sitting Orthostatic Pulse (bpm) 85 09/18 0850    Standing Orthostatic /86 09/18 0850    Standing Orthostatic Pulse (bpm) 93 09/18 0850          Appearance: awake, alert, dressed in hospital scrubs, and appeared as age stated  Attitude:  cooperative  Eye Contact:  poor   Mood:  anxious and sad   Affect:  constricted mobility  Speech:  clear, coherent and decreased prosody  Psychomotor Behavior:  no evidence of tardive dyskinesia, dystonia, or tics and intact station, gait and muscle tone  Throught Process:  linear and goal oriented  Associations:  no loose associations  Thought Content:  passive suicidal ideation present and auditory hallucinations " present  Insight:  partial  Judgement:  limited  Oriented to:  time, person, and place  Attention Span and Concentration:  fair  Recent and Remote Memory:  fair    Clinical Global Impressions  First: 7/4 9/18/2024      Most recent:            Precautions:     Behavioral Orders   Procedures    Code 1 - Restrict to Unit    Routine Programming     As clinically indicated    Status 15     Every 15 minutes.    Status Individual Observation     Patient SIO status reviewed with team/RN.  Please also refer to RN/team documentation for add'l detail.    -SIO staff to monitor following which have contributed to patient being on SIO:  Self-injury, cut self with steak knife.  -Possible interventions SIO staff could use to support patient's treatment progress:  Offer support, ensure no access to items with which he could engage in SIB  -When following observed, team will review discontinuation of SIO:  Patient maintains safe behavior and contracts for safety on the unit.     Order Specific Question:   CONTINUOUS 24 hours / day     Answer:   5 feet     Order Specific Question:   Indications for SIO     Answer:   Self-injury risk    Suicide precautions: Suicide Risk: HIGH     Patients on Suicide Precautions should have a Combination Diet ordered that includes a Diet selection(s) AND a Behavioral Tray selection for Safe Tray - with utensils, or Safe Tray - NO utensils       Order Specific Question:   Suicide Risk     Answer:   HIGH          DIagnoses:       Bipolar affective disorder, depressed vs Major depressive disorder, recurrent, severe with psychotic features; Borderline personality disorder; Multiple health conditions, see past medical history.          Plan:     Will increase Abilify dose. Patient's PD is being revoked. Will continue to provide with support and structure, will continue on SIO and No roommate order.    Total time spent was 37 minutes. Over 50% of times was spent counseling and coordination of care regarding  coping skills, medication and discharge planning.

## 2024-09-18 NOTE — PLAN OF CARE
Pt woke up then requested and received PRN 2 mg. Nicotine gum @ 0600. Pt appeared to sleep for a total of 6.5 hours overnight. No other PRN medications were administered and no other concerns were noted.     Problem: Sleep Disturbance  Goal: Adequate Sleep/Rest  Outcome: Progressing

## 2024-09-18 NOTE — PLAN OF CARE
"Patient has spent time going in between there room and the milieu. Did not attend the evening group. Had a visitor which went well. Continues to report having self harm and suicidal thoughts. States that he is experiencing anxiety and depression. Voices are telling him to harm himself. Requested prn klonopin and zyprexa for voices and agitation. Requested prn senna, states he hasn't had a bowl movement since the weekend. Requested prn tylenol for a headache. Affect is flat and appears distracted. Colored and talked on the phone to distract himself. Affect is flat and guarded. Med compliant. Ate dinner and snack.     Patient evaluation continues. Assessed mood,anxiety,thoughts and behavior.     Patient gradually progressing towards goals.    Patient is encouraged to participate in groups and assisted to develop healthy coping skills.     VS reviewed: /80 (BP Location: Right arm)   Pulse 99   Temp 98.7  F (37.1  C) (Temporal)   Resp 18   Ht 1.676 m (5' 6\")   Wt 101.1 kg (222 lb 12.8 oz)   LMP  (LMP Unknown)   SpO2 95%   BMI 35.96 kg/m      Length of stay: 1    Refer to daily team meeting notes for individualized plan of care. Nursing will continue to assess.    "

## 2024-09-19 LAB
GLUCOSE BLDC GLUCOMTR-MCNC: 113 MG/DL (ref 70–99)
GLUCOSE BLDC GLUCOMTR-MCNC: 132 MG/DL (ref 70–99)
GLUCOSE BLDC GLUCOMTR-MCNC: 137 MG/DL (ref 70–99)
LITHIUM SERPL-SCNC: 0.59 MMOL/L (ref 0.6–1.2)

## 2024-09-19 PROCEDURE — 250N000011 HC RX IP 250 OP 636: Performed by: PSYCHIATRY & NEUROLOGY

## 2024-09-19 PROCEDURE — 250N000013 HC RX MED GY IP 250 OP 250 PS 637: Performed by: PSYCHIATRY & NEUROLOGY

## 2024-09-19 PROCEDURE — 250N000013 HC RX MED GY IP 250 OP 250 PS 637: Performed by: FAMILY MEDICINE

## 2024-09-19 PROCEDURE — 99233 SBSQ HOSP IP/OBS HIGH 50: CPT | Performed by: PSYCHIATRY & NEUROLOGY

## 2024-09-19 PROCEDURE — 250N000013 HC RX MED GY IP 250 OP 250 PS 637

## 2024-09-19 PROCEDURE — 36415 COLL VENOUS BLD VENIPUNCTURE: CPT | Performed by: PSYCHIATRY & NEUROLOGY

## 2024-09-19 PROCEDURE — 124N000002 HC R&B MH UMMC

## 2024-09-19 PROCEDURE — 80178 ASSAY OF LITHIUM: CPT | Performed by: PSYCHIATRY & NEUROLOGY

## 2024-09-19 PROCEDURE — A9270 NON-COVERED ITEM OR SERVICE: HCPCS | Performed by: FAMILY MEDICINE

## 2024-09-19 RX ORDER — POLYETHYLENE GLYCOL 3350 17 G/17G
17 POWDER, FOR SOLUTION ORAL DAILY
Status: DISCONTINUED | OUTPATIENT
Start: 2024-09-19 | End: 2024-09-24 | Stop reason: HOSPADM

## 2024-09-19 RX ORDER — SALIVA STIMULANT COMB. NO.3
1 SPRAY, NON-AEROSOL (ML) MUCOUS MEMBRANE 4 TIMES DAILY PRN
Status: DISCONTINUED | OUTPATIENT
Start: 2024-09-19 | End: 2024-09-24 | Stop reason: HOSPADM

## 2024-09-19 RX ORDER — PALIPERIDONE 3 MG/1
3 TABLET, EXTENDED RELEASE ORAL DAILY
Status: DISCONTINUED | OUTPATIENT
Start: 2024-09-19 | End: 2024-09-24 | Stop reason: HOSPADM

## 2024-09-19 RX ORDER — MECLIZINE HCL 12.5 MG 12.5 MG/1
12.5 TABLET ORAL 3 TIMES DAILY PRN
Status: DISCONTINUED | OUTPATIENT
Start: 2024-09-19 | End: 2024-09-24 | Stop reason: HOSPADM

## 2024-09-19 RX ADMIN — NICOTINE POLACRILEX 2 MG: 2 GUM, CHEWING ORAL at 17:54

## 2024-09-19 RX ADMIN — OLANZAPINE 10 MG: 10 TABLET, ORALLY DISINTEGRATING ORAL at 18:08

## 2024-09-19 RX ADMIN — CLONAZEPAM 0.5 MG: 0.5 TABLET ORAL at 18:08

## 2024-09-19 RX ADMIN — PALIPERIDONE 3 MG: 3 TABLET, EXTENDED RELEASE ORAL at 14:41

## 2024-09-19 RX ADMIN — SENNOSIDES AND DOCUSATE SODIUM 1 TABLET: 8.6; 5 TABLET ORAL at 08:29

## 2024-09-19 RX ADMIN — NICOTINE POLACRILEX 2 MG: 2 GUM, CHEWING ORAL at 06:43

## 2024-09-19 RX ADMIN — ONDANSETRON 4 MG: 4 TABLET, ORALLY DISINTEGRATING ORAL at 10:54

## 2024-09-19 RX ADMIN — NICOTINE POLACRILEX 2 MG: 2 GUM, CHEWING ORAL at 08:02

## 2024-09-19 RX ADMIN — PAROXETINE 20 MG: 10 TABLET, FILM COATED ORAL at 08:24

## 2024-09-19 RX ADMIN — NICOTINE POLACRILEX 2 MG: 2 GUM, CHEWING ORAL at 10:22

## 2024-09-19 RX ADMIN — GABAPENTIN 600 MG: 600 TABLET, FILM COATED ORAL at 14:03

## 2024-09-19 RX ADMIN — NICOTINE POLACRILEX 2 MG: 2 GUM, CHEWING ORAL at 15:48

## 2024-09-19 RX ADMIN — ACETAMINOPHEN, ASPIRIN, CAFFEINE 1 TABLET: 250; 65; 250 TABLET, FILM COATED ORAL at 14:03

## 2024-09-19 RX ADMIN — EMPAGLIFLOZIN 10 MG: 10 TABLET, FILM COATED ORAL at 08:25

## 2024-09-19 RX ADMIN — Medication 15 MG: at 08:25

## 2024-09-19 RX ADMIN — BACITRACIN: 500 OINTMENT TOPICAL at 22:51

## 2024-09-19 RX ADMIN — SENNOSIDES AND DOCUSATE SODIUM 1 TABLET: 8.6; 5 TABLET ORAL at 15:58

## 2024-09-19 RX ADMIN — NICOTINE 1 PATCH: 21 PATCH, EXTENDED RELEASE TRANSDERMAL at 08:25

## 2024-09-19 RX ADMIN — GABAPENTIN 1200 MG: 400 CAPSULE ORAL at 08:25

## 2024-09-19 RX ADMIN — CLONAZEPAM 0.5 MG: 0.5 TABLET ORAL at 11:11

## 2024-09-19 RX ADMIN — POLYETHYLENE GLYCOL 3350 17 G: 17 POWDER, FOR SOLUTION ORAL at 11:57

## 2024-09-19 RX ADMIN — ROSUVASTATIN 40 MG: 20 TABLET, FILM COATED ORAL at 08:25

## 2024-09-19 RX ADMIN — INSULIN GLARGINE 5 UNITS: 100 INJECTION, SOLUTION SUBCUTANEOUS at 08:28

## 2024-09-19 RX ADMIN — TESTOSTERONE 100 MG OF TESTOSTERONE: 50 GEL TRANSDERMAL at 08:26

## 2024-09-19 RX ADMIN — MECLIZINE HYDROCHLORIDE 12.5 MG: 12.5 TABLET ORAL at 14:27

## 2024-09-19 RX ADMIN — AMLODIPINE BESYLATE 10 MG: 5 TABLET ORAL at 08:28

## 2024-09-19 ASSESSMENT — ACTIVITIES OF DAILY LIVING (ADL)
ADLS_ACUITY_SCORE: 53
ORAL_HYGIENE: INDEPENDENT
DRESS: INDEPENDENT
ADLS_ACUITY_SCORE: 53
LAUNDRY: WITH SUPERVISION
ADLS_ACUITY_SCORE: 53
ORAL_HYGIENE: INDEPENDENT
ADLS_ACUITY_SCORE: 53
HYGIENE/GROOMING: INDEPENDENT
LAUNDRY: WITH SUPERVISION
ADLS_ACUITY_SCORE: 53
HYGIENE/GROOMING: INDEPENDENT
ADLS_ACUITY_SCORE: 53
ADLS_ACUITY_SCORE: 53
DRESS: INDEPENDENT
ADLS_ACUITY_SCORE: 53

## 2024-09-19 NOTE — PLAN OF CARE
BEH IP Unit Acuity Rating Score (UARS)  Patient is given one point for every criteria they meet.    CRITERIA SCORING   On a 72 hour hold, court hold, committed, stay of commitment, or revocation. 1    Patient LOS on BEH unit exceeds 20 days. 0  LOS: 3   Patient under guardianship, 55+, otherwise medically complex, or under age 11. 1   Suicide ideation without relief of precipitating factors. 0   Current plan for suicide. 0   Current plan for homicide. 0   Imminent risk or actual attempt to seriously harm another without relief of factors precipitating the attempt. 0   Severe dysfunction in daily living (ex: complete neglect for self care, extreme disruption in vegetative function, extreme deterioration in social interactions). 0   Recent (last 7 days) or current physical aggression in the ED or on unit. 0   Restraints or seclusion episode in past 72 hours. 0   Recent (last 7 days) or current verbal aggression, agitation, yelling, etc., while in the ED or unit. 0   Active psychosis. 0   Need for constant or near constant redirection (from leaving, from others, etc).  0   Intrusive or disruptive behaviors. 0   Patient requires 3 or more hours of individualized nursing care per 8-hour shift (i.e. for ADLs, meds, therapeutic interventions). 0   TOTAL 2

## 2024-09-19 NOTE — PLAN OF CARE
Goal Outcome Evaluation:    Plan of Care Reviewed With: patient      Problem: Suicide Risk  Goal: Absence of Self-Harm  Outcome: Progressing  Note: Pt presented with a flat affect. Endorsed high depression and moderate anxiety - rated as a 9 and 5 respectively. Reported he had SI and SIB thoughts. He stated he was hearing voices telling him to hurt himself and to pull out his stitches. Stated he was keeping his left arm wound covered to prevent acting on these voices' commandments. Pt reported he has not had a bowel moment in a week. Senna was given for constipation and later miralax was ordered and given. He was out in the milieu during the shift. No complaints of trouble voiding. Reported he had dizziness since yesterday, which has been getting worse. Internal medicine paged and new orders were received. Pt requested and received zofran for nausea at 1055 with reported effect, klonopin for anxiety at 111, which he reported was helpful and meclizine for dizziness at 1427.    Temp: 97.7  F (36.5  C) Temp src: Oral BP: 120/83 Pulse: 79     SpO2: 96 % O2 Device: None (Room air)      Intervention: Assess Risk to Self and Maintain Safety  Recent Flowsheet Documentation  Taken 9/19/2024 0800 by Meghna Keith RN  Self-Harm Prevention: observed one-to-one

## 2024-09-19 NOTE — PLAN OF CARE
\\Team Note Due:  Tuesday     Assessment/Intervention/Current Symtoms and Care Coordination:  Chart review and met with team, discussed pt progress, symptomology, and response to treatment.  Discussed the discharge plan and any potential impediments to discharge.    Team report:  Pt is on a 1:1.  He is having command hallucinations to hurt self.  Pt was head banging last dunaway.  He was reporting auditory hallucinations.  He was having voices that told him to take out his stitches.  Abilify was increased.  There was difficulty with urination. There was a bladder scan and cath done.  Pt was asking for constipation interventions.      I called [facility] and obtqained the revocation order.       Discharge Plan or Goal:  Return home to [facility] apartment type.     Barriers to Discharge:  PD is being revoked       Referral Status:    Psychiatrist/Medication Management:  Name/Organization: Regine Last CNP, [place]  Phone: [phone]  Fax: [phone]     :  Name/Organization: [facility] - VAL signed  Phone:      Group Home:  Name/Organization: [employer] [facility] [place] Director  [facility] - VAL signed  Phone: [phone]     ARMHS:  Name/Organization: Deena Ibarra - VAL signed  Phone: [phone]      CADI Worker:  Name/Organization: Lydia Britt  [facility]  Phone: [phone]         MARK Hampton  Other contact information (family, friends, SO) and VAL status:   Pt reports there isn obody to sign ROIs.           Legal Status:  Commitment  No Ashley     [facility]  File Number: [id number]  Start and expiration date of commitment: ends December 14, 2024      Contacts (include VAL status):    Pt declined to sign any ROIs for anyone.        Upcoming Meetings and Dates/Important Information and next steps:    Discharge to home

## 2024-09-19 NOTE — CONSULTS
[facility]  Consult Note - Hospitalist Service  Date of Admission:  9/13/2024  Consult Requested by: Gillian Andrade MD  Reason for Consult: Headache after head-banging, urinary retention     Assessment & Plan   Prakash Prasad is a 34 year old adult admitted on 9/13/2024. He has a PMHx notable for type 2 DM, female to 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, GERD, ADHD, bipolar 1 disorder, polysubstance abuse, PTSD, borderline personality disorder, and TBI who is admitted for suicidal ideation. Internal Medicine was STAT consulted for headache after head-banging episode and urinary retention.     Headache after head-banging episode  Patient was sitting up in bed, back to the wall and proceeded to head bang the back of his head into the wall 3-4x. After he stopped, he said that it felt like the room was spinning and his ears hurt. No neurologic deficits per nursing. Provided tylenol with minimal relief. I saw the patient sitting in the lounge eating dinner, walked back to his room, normal gait and balance, speech clear. Upon exam, equal strength in all extremities, no numbness/tinging, no nystagmus, EOM intact. No evidence of bleeding at the occipital area; no erythema, swelling, or bony abnormalities upon palpation, slight tenderness to palpation.   - continue to monitor for neurologic changes  - patient is not on any blood thinners   - notify medicine of any acute neurologic changes or worsening pain; can consider head imaging     Urinary retention  Patient notified RN that he was having a difficult time urinating this evening. Felt like his bladder was full but hadn't been able to urinate for at least a few hours. Bladder scan completed showing 822 mls. RN notified Dr. Andrade notified, RN flyer to bedside for straight bath. Straight cath removed 1200 mls of urine.   - continue to monitor  - urinary management order set initiated   - note: the following medications can cause urinary  "retention   - Abilify: recent dose increase on 9/14, 5 mg -> 10 mg    - Paxil: recent dose increase on 9/18, 10 mg -> 20 mg    - Clonazepam: recent dose increase on 9/17, 0.25 mg -> 0.5 mg   - if symptoms do not resolve, recommend adjusting medications dosages of those listed above     Medicine will continue to follow.     Clinically Significant Risk Factors                  # Hypertension: Noted on problem list     # Acute Hypoxic Respiratory Failure: Documented O2 saturation < 90%. Continue supplemental oxygen as needed        # DMII: A1C = 6.7 % (Ref range: 0.0 - 5.6 %) within past 6 months, PRESENT ON ADMISSION  # Obesity: Estimated body mass index is 35.96 kg/m  as calculated from the following:    Height as of this encounter: 1.676 m (5' 6\").    Weight as of this encounter: 101.1 kg (222 lb 12.8 oz)., PRESENT ON ADMISSION     # Financial/Environmental Concerns:           Shanice Fulton NP  Hospitalist Service  Securely message with [employer] (more info)  Text page via [facility] Paging/Directory   ______________________________________________________________________    Chief Complaint   Headache     History is obtained from the patient, electronic health record, and RN    History of Present Illness   Prakash Prasad is a 34 year old adult admitted on 9/13/2024. He has a PMHx notable for type 2 DM, female to 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, GERD, ADHD, bipolar 1 disorder, polysubstance abuse, PTSD, borderline personality disorder, and TBI who is admitted for suicidal ideation. Internal Medicine was STAT consulted for headache after head-banging episode.     Prakash was seen sitting in the lounge eating dinner and we walked back to his room. He says that he was sitting up in bed, back to the wall and proceeded to head bang the back of his head into the wall 3-4x during an \"episode.\" After he stopped, he said that it felt like the room was spinning and his ears hurt. His ears still hurt \"a little\". Headache focused to the " occipital region where he hit his head. Tylenol with minimal relief. We discuss the plan to continue to monitor at this time but to alert nursing with any changes. All questions asked and answered at this time.       Past Medical History    Past Medical History:   Diagnosis Date    ADHD (attention deficit hyperactivity disorder)     Bipolar 1 disorder     Borderline personality disorder     Cauda equina syndrome     Chronic low back pain     Depression     Diabetes mellitus, type 2 (H) 1/19/2023    GERD (gastroesophageal reflux disease)     h/o TBI (traumatic brain injury)     Hypertension, unspecified type 12/16/2021    Marginal corneal ulcer, left 07/17/2015    Nephrolithiasis     obesity     Polysubstance abuse - methamphetamine, hallucinagen, heroin, marijuana     currently in remission    PONV (postoperative nausea and vomiting)     PTSD (post-traumatic stress disorder)        Past Surgical History   Past Surgical History:   Procedure Laterality Date    BACK SURGERY  12/24/2016    BACK SURGERY - For Cauda Equina Syndrome  12/24/2016    COLONOSCOPY      COMBINED CYSTOSCOPY, INSERT STENT URETER(S) Left 8/30/2018    Procedure: COMBINED CYSTOSCOPY, INSERT STENT URETER(S);  Cystoscopy With Left Stent Placement;  Surgeon: Kiran Ulrich MD;  Location: [facility] OR    COMBINED CYSTOSCOPY, RETROGRADES, EXCHANGE STENT URETER(S) Left 10/14/2018    Procedure: COMBINED CYSTOSCOPY, RETROGRADES, EXCHANGE STENT URETER(S);  Cystoscopy and removal of left-sided stent.;  Surgeon: Stiven Olivo MD;  Location:  OR    COMBINED CYSTOSCOPY, RETROGRADES, URETEROSCOPY, INSERT STENT  1/6/2014    Procedure: COMBINED CYSTOSCOPY, RETROGRADES, URETEROSCOPY, INSERT STENT;  Cystyoscopy place left ureteral stent;  Surgeon: Jaun Kimble MD;  Location: [facility] OR    COMBINED CYSTOSCOPY, RETROGRADES, URETEROSCOPY, INSERT STENT Left 10/23/2018    Procedure: Video cystoscopy, left ureteroscopy with stone extraction;  Surgeon:  Bull Mast MD;  Location:  OR    CYSTOSCOPY, URETEROSCOPY, COMBINED Right 9/23/2015    Procedure: COMBINED CYSTOSCOPY, URETEROSCOPY;  Surgeon: ROME Jett MD;  Location: [facility] OR    ENT SURGERY      ESOPHAGOSCOPY, GASTROSCOPY, DUODENOSCOPY (EGD), COMBINED  4/8/2013    Procedure: COMBINED ESOPHAGOSCOPY, GASTROSCOPY, DUODENOSCOPY (EGD), BIOPSY SINGLE OR MULTIPLE;  Gastroscopy;  Surgeon: Peter Pickard MD;  Location: [facility] GI    ESOPHAGOSCOPY, GASTROSCOPY, DUODENOSCOPY (EGD), COMBINED Left 10/28/2014    Procedure: COMBINED ESOPHAGOSCOPY, GASTROSCOPY, DUODENOSCOPY (EGD), BIOPSY SINGLE OR MULTIPLE;  Surgeon: Narcisa Ramirez MD;  Location:  OR    ESOPHAGOSCOPY, GASTROSCOPY, DUODENOSCOPY (EGD), COMBINED N/A 12/24/2018    Procedure: COMBINED ESOPHAGOSCOPY, GASTROSCOPY, DUODENOSCOPY (EGD), BIOPSY SINGLE OR MULTIPLE;  Surgeon: Sonu Verduzco MD;  Location: [facility]    INJECT EPIDURAL TRANSFORAMINAL LUMBAR / SACRAL EA ADDITIONAL LEVEL Left 3/18/2021    Procedure: Left L5/S1 transforaminal injection for selective L5 nerve root block;  Surgeon: Eliza Pagan MD;  Location: [facility]    LAPAROSCOPIC CHOLECYSTECTOMY  11/20/2014    [facility], Dr. Ramirez    LASER HOLMIUM LITHOTRIPSY URETER(S), INSERT STENT, COMBINED  4/2/2014    Procedure: COMBINED CYSTOSCOPY, URETEROSCOPY, LASER HOLMIUM LITHOTRIPSY URETER(S), INSERT STENT;  Cystoscopy,Left Ureteral Stent Removal,Left Ureteroscopy with Laser Lithotripsy,Left Ureter Stent Placement;  Surgeon: ROME Jett MD;  Location: [facility] OR    Transurethral stone resection  03/11/2011       Medications   Current Facility-Administered Medications   Medication Dose Route Frequency Provider Last Rate Last Admin    acetaminophen (TYLENOL) tablet 650 mg  650 mg Oral Q4H PRN Will Domingo DO   650 mg at 09/18/24 1736    alum & mag hydroxide-simethicone (MAALOX) suspension 30 mL  30 mL Oral Q4H PRN Will Domingo DO        amLODIPine (NORVASC) tablet 10 mg  10 mg Oral Daily  Cortez Kruger MD   10 mg at 09/18/24 0819    [START ON 9/19/2024] ARIPiprazole (ABILIFY) half-tab 15 mg  15 mg Oral QAM Cortez Kruger MD        bacitracin ointment   Topical BID Pennig, Ashlyn E, APRN CNP   Given at 09/18/24 1043    clonazePAM (klonoPIN) tablet 0.5 mg  0.5 mg Oral BID PRN Cortez Kruger MD   0.5 mg at 09/18/24 2021    glucose gel 15-30 g  15-30 g Oral Q15 Min PRN Pennig, Ashlyn E, APRN CNP        Or    dextrose 50 % injection 25-50 mL  25-50 mL Intravenous Q15 Min PRN Pennig, Ashlyn E, APRN CNP        Or    glucagon injection 1 mg  1 mg Subcutaneous Q15 Min PRN Pennig, Ashlyn E, APRN CNP        empagliflozin (JARDIANCE) tablet 10 mg  10 mg Oral Daily Mark Aponte MD   10 mg at 09/18/24 0820    gabapentin (NEURONTIN) capsule 1,200 mg  1,200 mg Oral BID Cortez Kruger MD   1,200 mg at 09/18/24 0819    gabapentin (NEURONTIN) tablet 600 mg  600 mg Oral Daily Cortez Kruger MD   600 mg at 09/18/24 1342    hydrOXYzine HCl (ATARAX) tablet 25 mg  25 mg Oral Q6H PRN Cortez Kruger MD   25 mg at 09/18/24 0946    insulin glargine (LANTUS PEN) injection 5 Units  5 Units Subcutaneous QAM AC Pennig, Ashlyn E, APRN CNP   5 Units at 09/18/24 0826    lithium (ESKALITH CR/LITHOBID) CR tablet 900 mg  900 mg Oral At Bedtime Cortez Kruger MD   900 mg at 09/17/24 2158    loperamide (IMODIUM) capsule 2 mg  2 mg Oral 4x Daily PRN Will Domingo DO        nicotine (NICODERM CQ) 21 MG/24HR 24 hr patch 1 patch  1 patch Transdermal Daily Cortez Kruger MD   1 patch at 09/18/24 0820    nicotine (NICORETTE) gum 2 mg  2 mg Buccal Q1H PRN Cortez Kruger MD   2 mg at 09/18/24 2022    OLANZapine zydis (zyPREXA) ODT tab 10 mg  10 mg Oral BID PRN Cortez Kruger MD   10 mg at 09/18/24 1606    ondansetron (ZOFRAN ODT) ODT tab 4 mg  4 mg Oral Q8H PRN Cortez Kruger MD   4 mg at 09/18/24 1231    PARoxetine (PAXIL) tablet 20 mg  20 mg Oral Daily Saskia  Cortez TOM MD   20 mg at 09/18/24 0819    QUEtiapine (SEROquel) tablet 250 mg  250 mg Oral At Bedtime Cortez Kruger MD   250 mg at 09/17/24 2158    rosuvastatin (CRESTOR) tablet 40 mg  40 mg Oral Daily Cortez Kruger MD   40 mg at 09/18/24 0826    senna-docusate (SENOKOT-S/PERICOLACE) 8.6-50 MG per tablet 1 tablet  1 tablet Oral BID PRN Will Domingo, DO   1 tablet at 09/18/24 1621    testosterone (ANDROGEL/TESTIM) 50 MG/5GM (1%) topical gel 100 mg of testosterone  100 mg of testosterone Transdermal Daily Mark Aponte MD   100 mg of testosterone at 09/18/24 1045    traZODone (DESYREL) tablet 50 mg  50 mg Oral At Bedtime PRN Will Domingo, DO        valACYclovir (VALTREX) tablet 2,000 mg  2,000 mg Oral BID PRN Cortez Kruger MD               Physical Exam   Vital Signs: Temp: 99.1  F (37.3  C) Temp src: Temporal BP: 124/80 Pulse: 77     SpO2: 95 % O2 Device: None (Room air)    Weight: 222 lbs 12.8 oz    GENERAL: Alert and awake. Answering questions appropriately. Oriented x 3. NAD. Pleasant and conversational   HEENT: Anicteric sclera. EOMI. Mucous membranes moist. No nystagmus. No scleral injection. PERRLA  RESPIRATORY: Effort normal on RA.   MUSCULOSKELETAL: No joint swelling or tenderness. Moves all extremities. Strength equal in upper and lower extremities   NEUROLOGICAL: No focal deficits. CN II-XII grossly intact. Moving all extremities symmetrically. Sensation intact.   SKIN: Intact. Warm and dry. No jaundice. No rashes on exposed skin. Occipital area with intact skin, no erythema, no swelling, no bony abnormalities   PSYCH: Affect appropriate     Medical Decision Making       40 MINUTES SPENT BY ME on the date of service doing chart review, history, exam, documentation & further activities per the note.      Data   Imaging results reviewed over the past 24 hrs:   No results found for this or any previous visit (from the past 24 hour(s)).  Recent Labs   Lab 09/18/24  1738 09/18/24  1225  09/18/24  0811   * 123* 199*

## 2024-09-19 NOTE — PROGRESS NOTES
RN flyer called to perform straight cath on Pt. Per primary nurse Pt had been bladder scanned for 820 mLs.   Pt was straight cath by this writer and another RN. Was able to get out 1200 mLs.     Lauryn Martinez RN on 9/18/2024 at 7:17 PM

## 2024-09-19 NOTE — PLAN OF CARE
Problem: Sleep Disturbance  Goal: Adequate Sleep/Rest  Outcome: Progressing  Intervention: Promote Sleep/Rest  Flowsheets (Taken 9/19/2024 0140)  Sleep/Rest Enhancement:   awakenings minimized   room darkened   noise level reduced     Goal Outcome Evaluation:    Patient in bed at the start of the shift and  appeared to be comfortably asleep and breathing with ease. Patient woke at 0230 to utilize the restroom.Patient slept for 5.25 hours of the over night shift with no s/s of acute distress.      SIO in place according to order. Patient experienced no safety events or escalations during the shift, no concerns reported or observed. Safety checks completed at least every 15 minutes. Patient required no PRN medications, see MAR.Patient has a no roommate order because they remain on a SIO at 5 feet for suicide and SIB precautions. Patient witnessed utilizing the restroom by the SIO staff. Patient reported voiding a large amount, expressing no complaints of pain or distention. Patient felt they emptied their bladder fully.  Nursing will continue to monitor.

## 2024-09-19 NOTE — PROGRESS NOTES
[facility], [place]   Psychiatric Progress Note        Interim History:   The patient's care was discussed with the treatment team during the daily team meeting and/or staff's chart notes were reviewed.  Staff report patient slept for about 5.5 hours last night. He had a bad day yesterday: reported intense Auditory hallucinations telling him to harm self and banged his head on the wall few times before was stopped by SIO staff. Prakash also reported difficulties with urination and had to have straight cath: about 1200 ml of urine was removed per IM. IM evaluated patient's meds, patient's head after head banging. No superficial injuries or neurological symptoms were appreciated. For more details, please, see Internal Medicine note. Appreciate medical team's input.     Met with patient: he was seen with his SIO staff in the conference room. He went there willingly and participated in conversation. Confirmed that he did head banging yesterday, said that it was provoked by Auditory hallucinations. Admitted that he should have told about his urges to do head banging to staff, said that urges were too strong and rather impulsive. We discussed using DBT skills. Prakash asked whether he would be a good candidate for ECT. I told him that in light of many coexisting with depression psychiatric diagnoses he might not benefit from ECT as much as person with only depression. We discussed that Prakash might have Borderline personality disorder. He disputed that. Said that he would really appreciate if we could find for him a right medication to help with Auditory hallucinations. We reviewed different meds. He said that he would prefer to be on DOMINGUEZ and, eventually, agreed to try Invega. He also asked me for Miralax and Bioten and had no other questions or concerns. Li level 9/19/2024 was 0.59.         Medications:     Current Facility-Administered Medications   Medication Dose Route Frequency Provider  Last Rate Last Admin    amLODIPine (NORVASC) tablet 10 mg  10 mg Oral Daily Cortez Kruger MD   10 mg at 09/19/24 0828    ARIPiprazole (ABILIFY) half-tab 15 mg  15 mg Oral QAM Cortez Kruger MD   15 mg at 09/19/24 0825    bacitracin ointment   Topical BID Ashlyn Linton APRN CNP   Given at 09/18/24 1043    empagliflozin (JARDIANCE) tablet 10 mg  10 mg Oral Daily Mark Aponte MD   10 mg at 09/19/24 0825    gabapentin (NEURONTIN) capsule 1,200 mg  1,200 mg Oral BID Cortez Kruger MD   1,200 mg at 09/19/24 0825    gabapentin (NEURONTIN) tablet 600 mg  600 mg Oral Daily Cortez Kruger MD   600 mg at 09/19/24 1403    insulin glargine (LANTUS PEN) injection 5 Units  5 Units Subcutaneous QAM AC Ashlyn Linton APRN CNP   5 Units at 09/19/24 0828    lithium (ESKALITH CR/LITHOBID) CR tablet 900 mg  900 mg Oral At Bedtime Cortez Kruger MD   900 mg at 09/18/24 2301    nicotine (NICODERM CQ) 21 MG/24HR 24 hr patch 1 patch  1 patch Transdermal Daily Cortez Kruger MD   1 patch at 09/19/24 0825    paliperidone ER (INVEGA) 24 hr tablet 3 mg  3 mg Oral Daily Cortez Kruger MD        PARoxetine (PAXIL) tablet 20 mg  20 mg Oral Daily Cortez Kruger MD   20 mg at 09/19/24 0824    polyethylene glycol (MIRALAX) Packet 17 g  17 g Oral Daily Cortez Kruger MD   17 g at 09/19/24 1157    QUEtiapine (SEROquel) tablet 250 mg  250 mg Oral At Bedtime Cortez Kruger MD   250 mg at 09/18/24 2301    rosuvastatin (CRESTOR) tablet 40 mg  40 mg Oral Daily Cortez Kruger MD   40 mg at 09/19/24 0825    testosterone (ANDROGEL/TESTIM) 50 MG/5GM (1%) topical gel 100 mg of testosterone  100 mg of testosterone Transdermal Daily Mark Aponte MD   100 mg of testosterone at 09/19/24 0826          Allergies:     Allergies   Allergen Reactions    Haldol [Haloperidol] Other (See Comments)     Makes patient very angry and anxious    Adhesive Tape Hives    Prednisone Other  "(See Comments) and Hives     Suicidal ideation    Droperidol Anxiety          Labs:     Recent Results (from the past 24 hour(s))   Glucose by meter    Collection Time: 09/18/24  5:38 PM   Result Value Ref Range    GLUCOSE BY METER POCT 106 (H) 70 - 99 mg/dL   Glucose by meter    Collection Time: 09/19/24  7:57 AM   Result Value Ref Range    GLUCOSE BY METER POCT 137 (H) 70 - 99 mg/dL   Lithium level    Collection Time: 09/19/24  8:46 AM   Result Value Ref Range    Lithium 0.59 (L) 0.60 - 1.20 mmol/L   Glucose by meter    Collection Time: 09/19/24 11:59 AM   Result Value Ref Range    GLUCOSE BY METER POCT 113 (H) 70 - 99 mg/dL          Psychiatric Examination:     /83   Pulse 79   Temp 97.7  F (36.5  C) (Oral)   Resp 18   Ht 1.676 m (5' 6\")   Wt 101.1 kg (222 lb 12.8 oz)   LMP  (LMP Unknown)   SpO2 96%   BMI 35.96 kg/m    Weight is 222 lbs 12.8 oz  Body mass index is 35.96 kg/m .    Orthostatic Vitals         Most Recent      Sitting Orthostatic /86 09/19 0831    Sitting Orthostatic Pulse (bpm) 83 09/19 0831    Standing Orthostatic /81 09/19 0831    Standing Orthostatic Pulse (bpm) 92 09/19 0831           Appearance: awake, alert, dressed in hospital scrubs, and appeared as age stated  Attitude:  cooperative  Eye Contact: better today  Mood: still anxious and sad   Affect:  constricted mobility  Speech:  clear, coherent and decreased prosody  Psychomotor Behavior:  no evidence of tardive dyskinesia, dystonia, or tics and intact station, gait and muscle tone  Throught Process:  linear and goal oriented  Associations:  no loose associations  Thought Content:  passive suicidal ideation present and auditory hallucinations present, thoughts of Self injurious behavior present  Insight:  partial  Judgement:  limited  Oriented to:  time, person, and place  Attention Span and Concentration:  fair  Recent and Remote Memory:  fair    Clinical Global Impressions  First: 7/4 9/18/2024      Most " recent:            Precautions:     Behavioral Orders   Procedures    Code 1 - Restrict to Unit    Routine Programming     As clinically indicated    Status 15     Every 15 minutes.    Status Individual Observation     Patient SIO status reviewed with team/RN.  Please also refer to RN/team documentation for add'l detail.    -SIO staff to monitor following which have contributed to patient being on SIO:  Self-injury, cut self with steak knife.  -Possible interventions SIO staff could use to support patient's treatment progress:  Offer support, ensure no access to items with which he could engage in SIB  -When following observed, team will review discontinuation of SIO:  Patient maintains safe behavior and contracts for safety on the unit.     Order Specific Question:   CONTINUOUS 24 hours / day     Answer:   5 feet     Order Specific Question:   Indications for SIO     Answer:   Self-injury risk    Suicide precautions: Suicide Risk: HIGH     Patients on Suicide Precautions should have a Combination Diet ordered that includes a Diet selection(s) AND a Behavioral Tray selection for Safe Tray - with utensils, or Safe Tray - NO utensils       Order Specific Question:   Suicide Risk     Answer:   HIGH          DIagnoses:       Bipolar affective disorder, depressed vs Major depressive disorder, recurrent, severe with psychotic features; Borderline personality disorder; Multiple health conditions, see past medical history.          Plan:     Will start Miralax, Biotene, Invega as of 9/19/2024. Patient's PD is being revoked. Will discontinue 72 hour hold, change status to committed. Will continue to provide with support and structure, will continue on SIO and No roommate order.    Total time spent was 39 minutes. Over 50% of times was spent counseling and coordination of care regarding coping skills, medication and discharge planning.

## 2024-09-19 NOTE — PROGRESS NOTES
Patient approached writer saying they were anxious and having self harm thoughts. Asked for a prn. Asked for klonopin and zyprexa for the voices. The sharpies were taken back from patient until the feelings subside.

## 2024-09-19 NOTE — PLAN OF CARE
"Patient has spent time going in between his room and the lounge. At the start of the shift expressed concern on not being able to urinate.  Became agitated and began to head bang. Providers were notified. Bladder scan completed, had 822 mL. Nurse flyers completed a straight cath and a total of 1200 mL was released. Internal med came on the unit and evaluated patient. Blood sugar was 105.    Reports being depressed and anxious. Voices are telling him to harm himself. Has fleeting thoughts of SI. Requested prn Klonopin and Zyprexa. Affect has been flat and tense. Had a visit which went well. Ate dinner and snack. Med compliant.     Patient evaluation continues. Assessed mood,anxiety,thoughts and behavior.     Patient gradually progressing towards goals.    Patient is encouraged to participate in groups and assisted to develop healthy coping skills.     VS reviewed: /80   Pulse 77   Temp 99.1  F (37.3  C) (Temporal)   Resp 18   Ht 1.676 m (5' 6\")   Wt 101.1 kg (222 lb 12.8 oz)   LMP  (LMP Unknown)   SpO2 95%   BMI 35.96 kg/m      Length of stay: 2    Refer to daily team meeting notes for individualized plan of care. Nursing will continue to assess.    "

## 2024-09-20 ENCOUNTER — PRE VISIT (OUTPATIENT)
Dept: ORTHOPEDICS | Facility: CLINIC | Age: 34
End: 2024-09-20

## 2024-09-20 LAB
ERYTHROCYTE [DISTWIDTH] IN BLOOD BY AUTOMATED COUNT: 13.8 % (ref 10–15)
GLUCOSE BLDC GLUCOMTR-MCNC: 117 MG/DL (ref 70–99)
GLUCOSE BLDC GLUCOMTR-MCNC: 140 MG/DL (ref 70–99)
GLUCOSE BLDC GLUCOMTR-MCNC: 237 MG/DL (ref 70–99)
HCT VFR BLD AUTO: 45.1 % (ref 35–53)
HGB BLD-MCNC: 15.2 G/DL (ref 11.7–17.7)
MCH RBC QN AUTO: 28.6 PG (ref 26.5–33)
MCHC RBC AUTO-ENTMCNC: 33.7 G/DL (ref 31.5–36.5)
MCV RBC AUTO: 85 FL (ref 78–100)
PLATELET # BLD AUTO: 277 10E3/UL (ref 150–450)
RBC # BLD AUTO: 5.32 10E6/UL (ref 3.8–5.9)
WBC # BLD AUTO: 11 10E3/UL (ref 4–11)

## 2024-09-20 PROCEDURE — 250N000013 HC RX MED GY IP 250 OP 250 PS 637: Performed by: PSYCHIATRY & NEUROLOGY

## 2024-09-20 PROCEDURE — 97150 GROUP THERAPEUTIC PROCEDURES: CPT | Mod: GO

## 2024-09-20 PROCEDURE — 99232 SBSQ HOSP IP/OBS MODERATE 35: CPT

## 2024-09-20 PROCEDURE — 250N000013 HC RX MED GY IP 250 OP 250 PS 637: Performed by: FAMILY MEDICINE

## 2024-09-20 PROCEDURE — 85041 AUTOMATED RBC COUNT: CPT

## 2024-09-20 PROCEDURE — A9270 NON-COVERED ITEM OR SERVICE: HCPCS | Performed by: FAMILY MEDICINE

## 2024-09-20 PROCEDURE — 36415 COLL VENOUS BLD VENIPUNCTURE: CPT

## 2024-09-20 PROCEDURE — 124N000002 HC R&B MH UMMC

## 2024-09-20 PROCEDURE — 250N000013 HC RX MED GY IP 250 OP 250 PS 637

## 2024-09-20 PROCEDURE — 99233 SBSQ HOSP IP/OBS HIGH 50: CPT | Performed by: PSYCHIATRY & NEUROLOGY

## 2024-09-20 RX ORDER — AMOXICILLIN 250 MG
1 CAPSULE ORAL AT BEDTIME
Status: DISCONTINUED | OUTPATIENT
Start: 2024-09-20 | End: 2024-09-24 | Stop reason: HOSPADM

## 2024-09-20 RX ORDER — BISACODYL 10 MG
10 SUPPOSITORY, RECTAL RECTAL DAILY PRN
Status: DISCONTINUED | OUTPATIENT
Start: 2024-09-20 | End: 2024-09-24 | Stop reason: HOSPADM

## 2024-09-20 RX ORDER — BISACODYL 5 MG
10 TABLET, DELAYED RELEASE (ENTERIC COATED) ORAL ONCE
Status: COMPLETED | OUTPATIENT
Start: 2024-09-20 | End: 2024-09-20

## 2024-09-20 RX ADMIN — PALIPERIDONE 3 MG: 3 TABLET, EXTENDED RELEASE ORAL at 08:32

## 2024-09-20 RX ADMIN — POLYETHYLENE GLYCOL 3350 17 G: 17 POWDER, FOR SOLUTION ORAL at 08:32

## 2024-09-20 RX ADMIN — NICOTINE POLACRILEX 2 MG: 2 GUM, CHEWING ORAL at 06:58

## 2024-09-20 RX ADMIN — INSULIN GLARGINE 5 UNITS: 100 INJECTION, SOLUTION SUBCUTANEOUS at 08:31

## 2024-09-20 RX ADMIN — HYDROXYZINE HYDROCHLORIDE 25 MG: 25 TABLET, FILM COATED ORAL at 16:01

## 2024-09-20 RX ADMIN — CLONAZEPAM 0.5 MG: 0.5 TABLET ORAL at 06:58

## 2024-09-20 RX ADMIN — AMLODIPINE BESYLATE 10 MG: 5 TABLET ORAL at 08:33

## 2024-09-20 RX ADMIN — TESTOSTERONE 100 MG OF TESTOSTERONE: 50 GEL TRANSDERMAL at 08:32

## 2024-09-20 RX ADMIN — Medication 15 MG: at 08:32

## 2024-09-20 RX ADMIN — PAROXETINE 20 MG: 10 TABLET, FILM COATED ORAL at 08:32

## 2024-09-20 RX ADMIN — NICOTINE POLACRILEX 2 MG: 2 GUM, CHEWING ORAL at 11:06

## 2024-09-20 RX ADMIN — NICOTINE POLACRILEX 2 MG: 2 GUM, CHEWING ORAL at 05:47

## 2024-09-20 RX ADMIN — NICOTINE POLACRILEX 2 MG: 2 GUM, CHEWING ORAL at 09:18

## 2024-09-20 RX ADMIN — NICOTINE POLACRILEX 2 MG: 2 GUM, CHEWING ORAL at 20:25

## 2024-09-20 RX ADMIN — GABAPENTIN 600 MG: 600 TABLET, FILM COATED ORAL at 13:15

## 2024-09-20 RX ADMIN — SENNOSIDES AND DOCUSATE SODIUM 1 TABLET: 50; 8.6 TABLET ORAL at 20:25

## 2024-09-20 RX ADMIN — BACITRACIN: 500 OINTMENT TOPICAL at 09:20

## 2024-09-20 RX ADMIN — NICOTINE POLACRILEX 2 MG: 2 GUM, CHEWING ORAL at 17:54

## 2024-09-20 RX ADMIN — QUETIAPINE FUMARATE 250 MG: 200 TABLET ORAL at 20:25

## 2024-09-20 RX ADMIN — ROSUVASTATIN 40 MG: 20 TABLET, FILM COATED ORAL at 08:32

## 2024-09-20 RX ADMIN — CLONAZEPAM 0.5 MG: 0.5 TABLET ORAL at 14:50

## 2024-09-20 RX ADMIN — BISACODYL 10 MG: 5 TABLET, COATED ORAL at 14:24

## 2024-09-20 RX ADMIN — GABAPENTIN 1200 MG: 400 CAPSULE ORAL at 08:32

## 2024-09-20 RX ADMIN — OLANZAPINE 10 MG: 10 TABLET, ORALLY DISINTEGRATING ORAL at 13:15

## 2024-09-20 RX ADMIN — EMPAGLIFLOZIN 10 MG: 10 TABLET, FILM COATED ORAL at 08:32

## 2024-09-20 RX ADMIN — NICOTINE POLACRILEX 2 MG: 2 GUM, CHEWING ORAL at 22:17

## 2024-09-20 RX ADMIN — GABAPENTIN 1200 MG: 400 CAPSULE ORAL at 20:24

## 2024-09-20 RX ADMIN — NICOTINE POLACRILEX 2 MG: 2 GUM, CHEWING ORAL at 16:01

## 2024-09-20 RX ADMIN — NICOTINE POLACRILEX 2 MG: 2 GUM, CHEWING ORAL at 12:13

## 2024-09-20 RX ADMIN — BISACODYL 10 MG: 10 SUPPOSITORY RECTAL at 11:06

## 2024-09-20 RX ADMIN — SENNOSIDES AND DOCUSATE SODIUM 1 TABLET: 8.6; 5 TABLET ORAL at 10:04

## 2024-09-20 RX ADMIN — NICOTINE POLACRILEX 2 MG: 2 GUM, CHEWING ORAL at 14:24

## 2024-09-20 RX ADMIN — LITHIUM CARBONATE 900 MG: 450 TABLET, EXTENDED RELEASE ORAL at 20:24

## 2024-09-20 RX ADMIN — NICOTINE 1 PATCH: 21 PATCH, EXTENDED RELEASE TRANSDERMAL at 08:32

## 2024-09-20 ASSESSMENT — ACTIVITIES OF DAILY LIVING (ADL)
ADLS_ACUITY_SCORE: 53
DRESS: INDEPENDENT
ADLS_ACUITY_SCORE: 53
LAUNDRY: WITH SUPERVISION
ADLS_ACUITY_SCORE: 53
ORAL_HYGIENE: INDEPENDENT
ADLS_ACUITY_SCORE: 53
HYGIENE/GROOMING: INDEPENDENT
ADLS_ACUITY_SCORE: 53
ADLS_ACUITY_SCORE: 53

## 2024-09-20 NOTE — PROGRESS NOTES
"Brief Medicine Note    Contacted by nursing regarding blood in stool.     Today's vital signs, medications, and nursing notes were reviewed.    /86   Pulse 81   Temp 98.1  F (36.7  C) (Oral)   Resp 16   Ht 1.676 m (5' 6\")   Wt 101.1 kg (222 lb 12.8 oz)   LMP  (LMP Unknown)   SpO2 96%   BMI 35.96 kg/m    General: A&O. NAD.     A/P:    Constipation  Pt reports not having a bowel movement for about 8 days. He received a suppository and had a moderate size bowel movement. He states there was quite a bit of blood in the toilet and on the toilet paper. Does not have a history of hemorrhoids. Denies pain, pruritus. VSS. No dizziness, lightheadedness.   - Check CBC w/ plt  - Dulcolax 10 mg PO x 1 dose  - Schedule senna at bedtime and continue PRN BID   - Witch hazel pads q1h PRN hemorrhoid discomfort   - Closely monitor and notify medicine with blood in bowel movements continues and worsens.     BROOKLYN Bailey CNP  Internal Medicine [facility] Hospitalist  Securely message with the [employer]oc[employer] Web Console (learn more here)  Text paging via [employer] - [place] is appreciated  September 20, 2024    "

## 2024-09-20 NOTE — PLAN OF CARE
"Goal Outcome Evaluation:    Pt is calm, affect blunted, mood is \"ok.\" Pt rates depression and and anxiety 9/10, also endorses si and sib thoughts; states is \"trying to\" be safe. His plan for suicide would be \"to overdose.\" He is on sio. Pt also endorses \"voices, that say derogatory things, like to hurt myself.\" He is going to \"try to go to one grp,\" and said he is social w peers. Pt reports he has \"a little sedation,\" from prn meds, last eleazar. Pt is wondering about suture removal, and another med for constipation. Pt will talk to provider about laxative, and IM to be contacted re 2 sutures to L forearm. They are C/D/I, and CASIE.    1143) Internal med provider Ashlyn BENAVIDES was notified re pt having notable bright red blood in her stool, after had suppository and lg BM. Provider said she would come and visit w pt; also will check her sutures for possible removal on Tues. Pt said she was satisfied with this, and did not have a hx of hemorrhoids.    1504) Pt has taken Zyprexa per request for voices, also prn klonopin. He requested extra dose of klonopin for this eleazar. Dr ANDERSON does not want to order this; to use other prns. Suggested pt rest in his rm and listen to music, after klonopin given. He agreed to try this. Steri strips were also placed on L forearm, so pt would tend not to pick at these, he said this helps. Lab work done per order, also prn dose dulcolax given.                        "

## 2024-09-20 NOTE — PLAN OF CARE
BEH IP Unit Acuity Rating Score (UARS)  Patient is given one point for every criteria they meet.    CRITERIA SCORING   On a 72 hour hold, court hold, committed, stay of commitment, or revocation. 1    Patient LOS on BEH unit exceeds 20 days. 0  LOS: 4   Patient under guardianship, 55+, otherwise medically complex, or under age 11. 1   Suicide ideation without relief of precipitating factors. 1   Current plan for suicide. 0   Current plan for homicide. 0   Imminent risk or actual attempt to seriously harm another without relief of factors precipitating the attempt. 0   Severe dysfunction in daily living (ex: complete neglect for self care, extreme disruption in vegetative function, extreme deterioration in social interactions). 0   Recent (last 7 days) or current physical aggression in the ED or on unit. 0   Restraints or seclusion episode in past 72 hours. 0   Recent (last 7 days) or current verbal aggression, agitation, yelling, etc., while in the ED or unit. 0   Active psychosis. 0   Need for constant or near constant redirection (from leaving, from others, etc).  0   Intrusive or disruptive behaviors. 0   Patient requires 3 or more hours of individualized nursing care per 8-hour shift (i.e. for ADLs, meds, therapeutic interventions). 1   TOTAL 5

## 2024-09-20 NOTE — PROGRESS NOTES
"[facility], [place]   Psychiatric Progress Note        Interim History:   The patient's care was discussed with the treatment team during the daily team meeting and/or staff's chart notes were reviewed.  Staff report patient slept for about 6.5 hours last night. Was reported to be cooperative and compliant with meds and care. Continued to report Auditory hallucinations telling her derogatory things and having thoughts of self harm, however, there was no Self injurious behavior yesterday and today. Continued to complain of constipation and was given suppository, reported blood in stool after that, was seen by IM, see note below (appreciate IM help):    \"Constipation  Pt reports not having a bowel movement for about 8 days. He received a suppository and had a moderate size bowel movement. He states there was quite a bit of blood in the toilet and on the toilet paper. Does not have a history of hemorrhoids. Denies pain, pruritus. VSS. No dizziness, lightheadedness.   - Check CBC w/ plt  - Dulcolax 10 mg PO x 1 dose  - Schedule senna at bedtime and continue PRN BID   - Witch hazel pads q1h PRN hemorrhoid discomfort   - Closely monitor and notify medicine with blood in bowel movements continues and worsens.\"     Met with patient: he was seen with his SIO staff in the conference room. He went there willingly and participated in conversation. Confirmed presence of Auditory hallucinations telling him to do self harm, said that yesterday and today instead of listening to voices, he talked to staff and didn't do any Self injurious behavior. I praised him for that. Denied having any side effects after invega was started yesterday and voiced hope that it would help him with Auditory hallucinations. In regards to missing yesterday hs meds, said that he didn't refuse them, but overslept and was not awakened to take them. He requested short computer time to look at his DBT home work and had no more " questions or concerns. Li level 9/19/2024 was 0.59.         Medications:     Current Facility-Administered Medications   Medication Dose Route Frequency Provider Last Rate Last Admin    amLODIPine (NORVASC) tablet 10 mg  10 mg Oral Daily Cortez Kruger MD   10 mg at 09/20/24 0833    ARIPiprazole (ABILIFY) half-tab 15 mg  15 mg Oral QAM Cortez Kruger MD   15 mg at 09/20/24 0832    bacitracin ointment   Topical BID Ashlyn Linton APRN CNP   Given at 09/20/24 0920    bisacodyl (DULCOLAX) EC tablet 10 mg  10 mg Oral Once Ashyln Linton APRN CNP        empagliflozin (JARDIANCE) tablet 10 mg  10 mg Oral Daily Mark Aponte MD   10 mg at 09/20/24 0832    gabapentin (NEURONTIN) capsule 1,200 mg  1,200 mg Oral BID Cortez Kruger MD   1,200 mg at 09/20/24 0832    gabapentin (NEURONTIN) tablet 600 mg  600 mg Oral Daily Cortez Kruger MD   600 mg at 09/20/24 1315    insulin glargine (LANTUS PEN) injection 5 Units  5 Units Subcutaneous QAM AC Ashlyn Linton APRN CNP   5 Units at 09/20/24 0831    lithium (ESKALITH CR/LITHOBID) CR tablet 900 mg  900 mg Oral At Bedtime Cortez Kruger MD   900 mg at 09/18/24 2301    nicotine (NICODERM CQ) 21 MG/24HR 24 hr patch 1 patch  1 patch Transdermal Daily Cortez Kruger MD   1 patch at 09/20/24 0832    paliperidone ER (INVEGA) 24 hr tablet 3 mg  3 mg Oral Daily Cortez Kruger MD   3 mg at 09/20/24 0832    PARoxetine (PAXIL) tablet 20 mg  20 mg Oral Daily Cortez Kruger MD   20 mg at 09/20/24 0832    polyethylene glycol (MIRALAX) Packet 17 g  17 g Oral Daily Cortez Kruger MD   17 g at 09/20/24 0832    QUEtiapine (SEROquel) tablet 250 mg  250 mg Oral At Bedtime Cortez Kruger MD   250 mg at 09/18/24 2301    rosuvastatin (CRESTOR) tablet 40 mg  40 mg Oral Daily Cortez Kruger MD   40 mg at 09/20/24 0832    senna-docusate (SENOKOT-S/PERICOLACE) 8.6-50 MG per tablet 1 tablet  1 tablet Oral At Bedtime  "Ashlyn Linton, BROOKLYN CNP        testosterone (ANDROGEL/TESTIM) 50 MG/5GM (1%) topical gel 100 mg of testosterone  100 mg of testosterone Transdermal Daily Mark Aponte MD   100 mg of testosterone at 09/20/24 0832          Allergies:     Allergies   Allergen Reactions    Haldol [Haloperidol] Other (See Comments)     Makes patient very angry and anxious    Adhesive Tape Hives    Prednisone Other (See Comments) and Hives     Suicidal ideation    Droperidol Anxiety          Labs:     Recent Results (from the past 24 hour(s))   Glucose by meter    Collection Time: 09/19/24  5:37 PM   Result Value Ref Range    GLUCOSE BY METER POCT 132 (H) 70 - 99 mg/dL   Glucose by meter    Collection Time: 09/20/24  8:21 AM   Result Value Ref Range    GLUCOSE BY METER POCT 140 (H) 70 - 99 mg/dL   Glucose by meter    Collection Time: 09/20/24 12:11 PM   Result Value Ref Range    GLUCOSE BY METER POCT 117 (H) 70 - 99 mg/dL          Psychiatric Examination:     /86   Pulse 81   Temp 98.1  F (36.7  C) (Oral)   Resp 16   Ht 1.676 m (5' 6\")   Wt 101.1 kg (222 lb 12.8 oz)   LMP  (LMP Unknown)   SpO2 96%   BMI 35.96 kg/m    Weight is 222 lbs 12.8 oz  Body mass index is 35.96 kg/m .    Orthostatic Vitals         Most Recent      Sitting Orthostatic /81 09/20 0816    Sitting Orthostatic Pulse (bpm) 81 09/20 0816    Standing Orthostatic /88 09/20 0816    Standing Orthostatic Pulse (bpm) 92 09/20 0816           Appearance: awake, alert, dressed in hospital scrubs, and appeared as age stated  Attitude: more cooperative  Eye Contact: better today  Mood: still anxious and sad   Affect:  constricted mobility, not as flat as before  Speech:  clear, coherent and decreased prosody  Psychomotor Behavior:  no evidence of tardive dyskinesia, dystonia, or tics and intact station, gait and muscle tone  Throught Process:  linear and goal oriented  Associations:  no loose associations  Thought Content:  passive suicidal ideation " present and auditory hallucinations present, thoughts of Self injurious behavior present, denies presence of suicidal plan, contracts for safety;  Insight:  partial  Judgement:  limited  Oriented to:  time, person, and place  Attention Span and Concentration:  fair  Recent and Remote Memory:  fair    Clinical Global Impressions  First: 7/4 9/18/2024      Most recent:            Precautions:     Behavioral Orders   Procedures    Code 1 - Restrict to Unit    Routine Programming     As clinically indicated    Status 15     Every 15 minutes.    Status Individual Observation     Patient SIO status reviewed with team/RN.  Please also refer to RN/team documentation for add'l detail.    -SIO staff to monitor following which have contributed to patient being on SIO:  Self-injury, cut self with steak knife.  -Possible interventions SIO staff could use to support patient's treatment progress:  Offer support, ensure no access to items with which he could engage in SIB  -When following observed, team will review discontinuation of SIO:  Patient maintains safe behavior and contracts for safety on the unit.     Order Specific Question:   CONTINUOUS 24 hours / day     Answer:   5 feet     Order Specific Question:   Indications for SIO     Answer:   Self-injury risk    Suicide precautions: Suicide Risk: HIGH     Patients on Suicide Precautions should have a Combination Diet ordered that includes a Diet selection(s) AND a Behavioral Tray selection for Safe Tray - with utensils, or Safe Tray - NO utensils       Order Specific Question:   Suicide Risk     Answer:   HIGH          DIagnoses:       Bipolar affective disorder, depressed vs Major depressive disorder, recurrent, severe with psychotic features; Borderline personality disorder; Multiple health conditions, see past medical history.          Plan:     We started Invega on 9/19/2024. Will order Bisacodyl, no other med changes 9/20/2024. Patient's PD is being revoked, patient is  fully committed. Will continue to provide with support and structure, will continue on SIO and No roommate order.    Total time spent was 38 minutes. Over 50% of times was spent counseling and coordination of care regarding coping skills, medication and discharge planning.

## 2024-09-20 NOTE — PLAN OF CARE
Team Note Due:  Tuesday     Assessment/Intervention/Current Symtoms and Care Coordination:  Chart review and met with team, discussed pt progress, symptomology, and response to treatment.  Discussed the discharge plan and any potential impediments to discharge.    Team report:  Pt remains on a 1:1.  He is having command hallucinations to hurt self.  Pt was head banging 2 nights ago.  He was reporting auditory hallucinations.  He was having voices that told him to take out his stitches.  Abilify was increased.  Pt has recently been focused on physical things like bowels, wounds, urination  Pt declined Seroquel and lithium last night.  Lithium level is low.    Pt requested a new CTC, this was reviewed by management and this request was declined.    Given his resources an plan in place, it is not clear what services the pt would need from a CTC.         Discharge Plan or Goal:  Return home to [facility] apartment type.     Barriers to Discharge:  Pt continues to report suicidal ideation if        Referral Status:    Psychiatrist/Medication Management:  Name/Organization: Regine Last CNP, APRN [facility]  Phone: [phone]  Fax: [phone]     :  Name/Organization: [facility] - VAL signed  Phone:      Group Home:  Name/Organization: [facility] [place] Director  [facility] - VAL signed  Phone: [phone]     [facility]:  Name/Organization: Deena Ibarra - VAL signed  Phone: [phone]      CADI Worker:  Name/Organization: Lydia Britt  [facility]  Phone: [phone]         MARK Hampton  Other contact information (family, friends, SO) and VAL status:   Pt reports there isn obody to sign ROIs.           Legal Status:  Commitment  No [facility]  File Number: [id number]  Start and expiration date of commitment: ends December 14, 2024      Contacts (include VAL status):    Pt declined to sign any ROIs for anyone.        Upcoming  Meetings and Dates/Important Information and next steps:    Discharge to home

## 2024-09-20 NOTE — PLAN OF CARE
Rehab Group    Start time: 10:20  End time: 11:50  Patient time total: 40 minutes    attended partial group    #6 attended   Group Type: OT Clinicx2   Group Topic Covered: balanced lifestyle, cognitive activities, coping skills, emotional regulation, healthy leisure time, and social skills     Group Session Detail:  Pt actively participated in occupational therapy clinic to facilitate coping skill exploration, creative expression within personally meaningful activities, and clinical observation of social, cognitive, and kinesthetic performance skills.      Patient Response/Contribution:  cooperative with task, worked intermittently, and engaged socially when prompted     Patient Detail:  Pt response: supervision/Leighton to initiate, gather materials, sequence, and adjust to workspace demands as needed. Demonstrated fair-good focus, planning, and problem solving for selected simple, 1 step creative task and cognitive puzzle task. Patient utilized supplies in a safe manner and readily returned supplies at conclusion of group without prompting. Able to ask for assistance as needed, and intermittently social with peers and staff. SIO staff was present throughout full duration of group. Affect: blunted. Mood: calm and cooperative.          [id number] OT Group (2 or more in attendance)      Patient Active Problem List   Diagnosis    ADHD (attention deficit hyperactivity disorder)    Bipolar 1 disorder, manic, mild    Marijuana abuse    Polysubstance abuse (H)    GERD (gastroesophageal reflux disease)    Tobacco abuse    Intractable back pain    Optic neuritis    Cauda equina syndrome with neurogenic bladder (H)    Schizoaffective disorder, bipolar type (H)    PTSD (post-traumatic stress disorder)    Anxiety    Auditory hallucination    Nephrolithiasis    Cyst of left ovary    Borderline personality disorder (H)    Cannabis use disorder, severe, dependence (H)    Depression    Episodic mood disorder (H24)    History of heroin  abuse (H)    Moderate episode of recurrent major depressive disorder (H)    Opioid use disorder, severe, dependence (H)    Substance-induced psychotic disorder with hallucinations (H)    Nausea    Urinary retention    Chronic bilateral low back pain without sciatica    AVA (generalized anxiety disorder)    Aggressive behavior    Lumbosacral radiculopathy at L5    Abnormal uterine bleeding    Acanthosis nigricans    Schizoaffective disorder, chronic condition with acute exacerbation (H)    Bipolar affective disorder, mixed, severe, with psychotic behavior (H)    Schizophrenia, schizoaffective, chronic with acute exacerbation (H)    Akathisia    Hypertension, unspecified type    Female-to-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 person    Morbid obesity (H)    Mood disorder due to a general medical condition    Pain of right hand    Acne vulgaris    Type 2 diabetes mellitus with hyperglycemia, with long-term current use of insulin (H)    Herpes labialis    Major depressive disorder, recurrent severe without psychotic features (H)

## 2024-09-20 NOTE — PLAN OF CARE
"Patient has spent majority time in between the lounge and milieu. Reports that he has been experiencing command hallucinations to harm himself, by pulling out his stitches. Reports being anxious and depressed. Need several times to be reassured and redirected to come out of his room. Wound was cleaned and bacitracin placed. Patient wanted it covered with gauze.Requested prn Klonopin and Zyprexa.  Ate dinner and snack. Blood sugar was 132. Attempted to give him his HS medications but declined.     Patient evaluation continues. Assessed mood,anxiety,thoughts and behavior.     Patient gradually progressing towards goals.    Patient is encouraged to participate in groups and assisted to develop healthy coping skills.     VS reviewed: /86   Pulse 84   Temp 98  F (36.7  C) (Oral)   Resp 16   Ht 1.676 m (5' 6\")   Wt 101.1 kg (222 lb 12.8 oz)   LMP  (LMP Unknown)   SpO2 98%   BMI 35.96 kg/m      Length of stay: 3    Refer to daily team meeting notes for individualized plan of care. Nursing will continue to assess.    "

## 2024-09-20 NOTE — PLAN OF CARE
Problem: Sleep Disturbance  Goal: Adequate Sleep/Rest  Outcome: Progressing  Intervention: Promote Sleep/Rest  Flowsheets (Taken 9/20/2024 4492)  Sleep/Rest Enhancement:   awakenings minimized   room darkened   noise level reduced     Goal Outcome Evaluation:    Patient in bed at the start of the shift and  appeared to be comfortably asleep and breathing with ease throughout the shift. Patient slept for 6.5 hours of the over night shift with no s/s of acute distress.    SIO in place according to order. Patient experienced no safety events or escalations during the shift, no concerns reported or observed. Safety checks completed at least every 15 minutes. Patient required no PRN medications, see MAR. Patient has a no roommate order because they remain on suicide and SIB with SIO staff at 5 feet. Patient experienced no difficulty voiding or complain of pain. Nursing will continue to monitor.

## 2024-09-21 LAB
ALBUMIN UR-MCNC: NEGATIVE MG/DL
APPEARANCE UR: CLEAR
BILIRUB UR QL STRIP: NEGATIVE
COLOR UR AUTO: ABNORMAL
GLUCOSE BLDC GLUCOMTR-MCNC: 143 MG/DL (ref 70–99)
GLUCOSE BLDC GLUCOMTR-MCNC: 143 MG/DL (ref 70–99)
GLUCOSE BLDC GLUCOMTR-MCNC: 148 MG/DL (ref 70–99)
GLUCOSE UR STRIP-MCNC: >=1000 MG/DL
HGB UR QL STRIP: NEGATIVE
KETONES UR STRIP-MCNC: NEGATIVE MG/DL
LEUKOCYTE ESTERASE UR QL STRIP: NEGATIVE
NITRATE UR QL: NEGATIVE
PH UR STRIP: 6 [PH] (ref 5–7)
RBC URINE: <1 /HPF
SP GR UR STRIP: 1.02 (ref 1–1.03)
UROBILINOGEN UR STRIP-MCNC: NORMAL MG/DL
WBC URINE: 0 /HPF

## 2024-09-21 PROCEDURE — A9270 NON-COVERED ITEM OR SERVICE: HCPCS | Performed by: FAMILY MEDICINE

## 2024-09-21 PROCEDURE — 250N000013 HC RX MED GY IP 250 OP 250 PS 637: Performed by: PSYCHIATRY & NEUROLOGY

## 2024-09-21 PROCEDURE — 250N000013 HC RX MED GY IP 250 OP 250 PS 637: Performed by: FAMILY MEDICINE

## 2024-09-21 PROCEDURE — 99232 SBSQ HOSP IP/OBS MODERATE 35: CPT

## 2024-09-21 PROCEDURE — 124N000002 HC R&B MH UMMC

## 2024-09-21 PROCEDURE — 81001 URINALYSIS AUTO W/SCOPE: CPT

## 2024-09-21 PROCEDURE — 250N000011 HC RX IP 250 OP 636: Performed by: PSYCHIATRY & NEUROLOGY

## 2024-09-21 PROCEDURE — 250N000013 HC RX MED GY IP 250 OP 250 PS 637

## 2024-09-21 RX ORDER — LACTULOSE 10 G/15ML
20 SOLUTION ORAL ONCE
Status: COMPLETED | OUTPATIENT
Start: 2024-09-21 | End: 2024-09-21

## 2024-09-21 RX ADMIN — Medication 15 MG: at 08:07

## 2024-09-21 RX ADMIN — NICOTINE POLACRILEX 2 MG: 2 GUM, CHEWING ORAL at 21:45

## 2024-09-21 RX ADMIN — NICOTINE POLACRILEX 2 MG: 2 GUM, CHEWING ORAL at 05:53

## 2024-09-21 RX ADMIN — NICOTINE POLACRILEX 2 MG: 2 GUM, CHEWING ORAL at 13:26

## 2024-09-21 RX ADMIN — ACETAMINOPHEN 650 MG: 325 TABLET ORAL at 19:22

## 2024-09-21 RX ADMIN — GABAPENTIN 600 MG: 600 TABLET, FILM COATED ORAL at 13:26

## 2024-09-21 RX ADMIN — BACITRACIN: 500 OINTMENT TOPICAL at 21:45

## 2024-09-21 RX ADMIN — POLYETHYLENE GLYCOL 3350 17 G: 17 POWDER, FOR SOLUTION ORAL at 08:22

## 2024-09-21 RX ADMIN — SENNOSIDES AND DOCUSATE SODIUM 1 TABLET: 8.6; 5 TABLET ORAL at 08:22

## 2024-09-21 RX ADMIN — PALIPERIDONE 3 MG: 3 TABLET, EXTENDED RELEASE ORAL at 08:07

## 2024-09-21 RX ADMIN — ACETAMINOPHEN 650 MG: 325 TABLET ORAL at 10:58

## 2024-09-21 RX ADMIN — PAROXETINE 20 MG: 10 TABLET, FILM COATED ORAL at 08:07

## 2024-09-21 RX ADMIN — INSULIN GLARGINE 5 UNITS: 100 INJECTION, SOLUTION SUBCUTANEOUS at 08:08

## 2024-09-21 RX ADMIN — GABAPENTIN 1200 MG: 400 CAPSULE ORAL at 08:07

## 2024-09-21 RX ADMIN — TESTOSTERONE 100 MG OF TESTOSTERONE: 50 GEL TRANSDERMAL at 08:07

## 2024-09-21 RX ADMIN — AMLODIPINE BESYLATE 10 MG: 5 TABLET ORAL at 08:07

## 2024-09-21 RX ADMIN — NICOTINE POLACRILEX 2 MG: 2 GUM, CHEWING ORAL at 16:40

## 2024-09-21 RX ADMIN — NICOTINE POLACRILEX 2 MG: 2 GUM, CHEWING ORAL at 19:22

## 2024-09-21 RX ADMIN — EMPAGLIFLOZIN 10 MG: 10 TABLET, FILM COATED ORAL at 08:07

## 2024-09-21 RX ADMIN — ONDANSETRON 4 MG: 4 TABLET, ORALLY DISINTEGRATING ORAL at 11:29

## 2024-09-21 RX ADMIN — NICOTINE POLACRILEX 2 MG: 2 GUM, CHEWING ORAL at 09:32

## 2024-09-21 RX ADMIN — NICOTINE POLACRILEX 2 MG: 2 GUM, CHEWING ORAL at 10:58

## 2024-09-21 RX ADMIN — BACITRACIN: 500 OINTMENT TOPICAL at 08:23

## 2024-09-21 RX ADMIN — NICOTINE POLACRILEX 2 MG: 2 GUM, CHEWING ORAL at 12:31

## 2024-09-21 RX ADMIN — NICOTINE 1 PATCH: 21 PATCH, EXTENDED RELEASE TRANSDERMAL at 08:22

## 2024-09-21 RX ADMIN — CLONAZEPAM 0.5 MG: 0.5 TABLET ORAL at 23:13

## 2024-09-21 RX ADMIN — CLONAZEPAM 0.5 MG: 0.5 TABLET ORAL at 13:26

## 2024-09-21 RX ADMIN — LACTULOSE 20 G: 20 SOLUTION ORAL at 16:04

## 2024-09-21 RX ADMIN — SENNOSIDES AND DOCUSATE SODIUM 1 TABLET: 8.6; 5 TABLET ORAL at 16:40

## 2024-09-21 RX ADMIN — ROSUVASTATIN 40 MG: 20 TABLET, FILM COATED ORAL at 08:07

## 2024-09-21 RX ADMIN — NICOTINE POLACRILEX 2 MG: 2 GUM, CHEWING ORAL at 07:26

## 2024-09-21 ASSESSMENT — ACTIVITIES OF DAILY LIVING (ADL)
ORAL_HYGIENE: PROMPTS
ADLS_ACUITY_SCORE: 53
ADLS_ACUITY_SCORE: 63
ORAL_HYGIENE: INDEPENDENT
ADLS_ACUITY_SCORE: 53
ADLS_ACUITY_SCORE: 53
ADLS_ACUITY_SCORE: 63
ADLS_ACUITY_SCORE: 53
DRESS: STREET CLOTHES
DRESS: STREET CLOTHES
ADLS_ACUITY_SCORE: 63
LAUNDRY: WITH SUPERVISION
ADLS_ACUITY_SCORE: 63
ADLS_ACUITY_SCORE: 53
ADLS_ACUITY_SCORE: 53
ADLS_ACUITY_SCORE: 63
ADLS_ACUITY_SCORE: 53
ADLS_ACUITY_SCORE: 63
ADLS_ACUITY_SCORE: 53
ADLS_ACUITY_SCORE: 53
ADLS_ACUITY_SCORE: 63
HYGIENE/GROOMING: PROMPTS
HYGIENE/GROOMING: INDEPENDENT;PROMPTS
LAUNDRY: WITH SUPERVISION
ADLS_ACUITY_SCORE: 63
ADLS_ACUITY_SCORE: 53

## 2024-09-21 NOTE — PROGRESS NOTES
Bladder scan completed 584. On call flyer paged to initiate a straight cath. Patient stating that he is having discomfort.

## 2024-09-21 NOTE — PROGRESS NOTES
Patient took the lactulose and about 45 minutes later had a large bowl movement that appeared hard in nature. Bright red blood was noted in the toilet. Ordered tucks pad and will give patient when it arrives from pharmacy.

## 2024-09-21 NOTE — PLAN OF CARE
"  Problem: Adult Behavioral Health Plan of Care  Goal: Plan of Care Review  Recent Flowsheet Documentation  Taken 9/21/2024 0900 by Tania Malik RN  Patient Agreement with Plan of Care: agrees   Goal Outcome Evaluation:    Plan of Care Reviewed With: patient               Presentation: The patient is observed in the dining room, with his SIO and coloring. The patient is dressed in street clothes.  Affect is flat and blunted.  Speech is clear and coherent.  Mood is depressed, calm and cooperative.  Thoughts indicate poor concentration.        Mental health symptoms: The patient endorses SI with a plan.  \"I always have a plan,\" stated the patient.  The patient endorses SIB with thoughts to pull his sutures.  The patient verbalizes auditory hallucinations, that tells him to commit suicide, pull his sutures, bang his head on the wall, and elope.  The patient states he has thoughts of wishing his is dead.  \"I am tired of living like this,' said the patient. The patient is working on coping skills and has contracted for safety. The patient identified coping skills: coloring and deep breathing. The writer suggested; cold packs, warm blanket, shower, and coloring on the chalk board. The patient denies HI.  The patient rates his depression 9/10 and anxiety 3/10.      Medication compliance: The patient is compliant with all medications.      Medical Concerns: The patient reported unable to void.  The patient has a history and care plan in placed when the patient is unable to void. The patient is bladder scan with 750ml.  The Critical Care Flyer is called to straight cath the patient.  The results were 1600ml.  The patient c/o pain and nausea.  The patient is given Tylenol 650mg and Zofran ODT.  The patient has a voiding protocol.  Please refer to the EMAR for details.    The patient c/o constipation.  Reported having some relief, yet continues to feel pressure.  He would like his medications reviewed.  The " patient declined an enema.     PRN's: Tylenol 650 for back pain.  Zofran for nausea and nicotine gum    The patient was seen by IM.  New orders are received.  Please refer to the EMAR.      The patients pet arrived.  The pet had an emesis in the OT room.  Please speak with team to determine if the pet visit should continue.     The patients worker (ILS) visited this afternoon.    Precautions: suicidal    Continue with plan of care

## 2024-09-21 NOTE — PROGRESS NOTES
"Brief Medicine Note    Contacted by nursing regarding constipation and urine retention.     Today's vital signs, medications, and nursing notes were reviewed.    /86 (BP Location: Left arm)   Pulse 99   Temp 98.1  F (36.7  C) (Temporal)   Resp 16   Ht 1.676 m (5' 6\")   Wt 101.1 kg (222 lb 12.8 oz)   LMP  (LMP Unknown)   SpO2 96%   BMI 35.96 kg/m    General: A&O. NAD.     A/P:    Urine retention  Had an episode of urinary retention on 9/18.  Bladder scan showed 822 mls and had a straight cath for 1200 mLs had been urinating.  On resolved since.  Does have a history of neurogenic bladder from a back injury.  Today bladder scan showed 750 cc and was straight cathed for 1600 cc.    - Check UA  - note: the following medications can cause urinary retention              - Abilify: recent dose increase on 9/14, 5 mg -> 10 mg               - Paxil: recent dose increase on 9/18, 10 mg -> 20 mg               - Clonazepam: recent dose increase on 9/17, 0.25 mg -> 0.5 mg   - Continue urinary management order set  - Treating constipation as below    Constipation  Pt reports not having a bowel movement for about 8 days. He received a suppository and had a moderate size bowel movement. He states there was quite a bit of blood in the toilet and on the toilet paper. Does not have a history of hemorrhoids. Denies pain, pruritus. VSS. No dizziness, lightheadedness. CBC normal. No signs of bleeding today. Took dulcolax 10 mg PO yesterday. Has not had another bowel movement since yesterday.   - Lactulose 20 g x 1 dose   - Schedule senna at bedtime and continue PRN BID   - Witch hazel pads q1h PRN hemorrhoid discomfort   - Closely monitor and notify medicine with blood in bowel movements continues and worsens.     BROOKLYN Bailey CNP  Internal Medicine [facility] Hospitalist  Securely message with the Vocera Web Console (learn more here)  Text paging via [employer] is appreciated  September 20, 2024    "

## 2024-09-21 NOTE — PLAN OF CARE
Problem: Sleep Disturbance  Goal: Adequate Sleep/Rest  Outcome: Progressing  Intervention: Promote Sleep/Rest  Flowsheets (Taken 9/21/2024 0114)  Sleep/Rest Enhancement:   awakenings minimized   room darkened   noise level reduced     Goal Outcome Evaluation:    Patient in bed at the start of the shift and  appeared to be comfortably asleep and breathing with ease throughout the shift. Patient slept for 6.25 hours of the over night shift with no s/s of acute distress.    SIO in place according to order. Patient experienced no safety events or escalations during the shift, no concerns reported or observed. Safety checks completed at least every 15 minutes. Patient required PRN Nicotine gum, see MAR. Patient has a no roommate order because they remain on a SIO at 5 feet for SIB and suicide precautions. Patient denied complications with voiding and bladder distention. Nursing will continue to monitor.

## 2024-09-21 NOTE — PLAN OF CARE
"Patient has spent majority of the shift in the milieu. Worked on coloring sheets and read. Had a visitor which went well. Reports being anxious and depressed. States that the voices are telling him to harm himself. Distracting himself and requested prn hydroxyzine. Adl's could use improvement. Med compliant. Ate dinner and snack.     Patient evaluation continues. Assessed mood,anxiety,thoughts and behavior.     Patient gradually.0 progressing towards goals.    Patient is encouraged to participate in groups and assisted to develop healthy coping skills.     VS reviewed: /77 (BP Location: Right arm)   Pulse 89   Temp 98  F (36.7  C) (Oral)   Resp 16   Ht 1.676 m (5' 6\")   Wt 101.1 kg (222 lb 12.8 oz)   LMP  (LMP Unknown)   SpO2 95%   BMI 35.96 kg/m      Length of stay: 4    Refer to daily team meeting notes for individualized plan of care. Nursing will continue to assess.    "

## 2024-09-22 ENCOUNTER — APPOINTMENT (OUTPATIENT)
Dept: CT IMAGING | Facility: CLINIC | Age: 34
End: 2024-09-22
Payer: MEDICARE

## 2024-09-22 LAB
GLUCOSE BLDC GLUCOMTR-MCNC: 140 MG/DL (ref 70–99)
GLUCOSE BLDC GLUCOMTR-MCNC: 192 MG/DL (ref 70–99)
GLUCOSE BLDC GLUCOMTR-MCNC: 91 MG/DL (ref 70–99)

## 2024-09-22 PROCEDURE — 250N000013 HC RX MED GY IP 250 OP 250 PS 637: Performed by: FAMILY MEDICINE

## 2024-09-22 PROCEDURE — A9270 NON-COVERED ITEM OR SERVICE: HCPCS | Performed by: FAMILY MEDICINE

## 2024-09-22 PROCEDURE — 250N000013 HC RX MED GY IP 250 OP 250 PS 637: Performed by: PSYCHIATRY & NEUROLOGY

## 2024-09-22 PROCEDURE — G1010 CDSM STANSON: HCPCS | Performed by: RADIOLOGY

## 2024-09-22 PROCEDURE — 74176 CT ABD & PELVIS W/O CONTRAST: CPT | Mod: MG

## 2024-09-22 PROCEDURE — 250N000013 HC RX MED GY IP 250 OP 250 PS 637

## 2024-09-22 PROCEDURE — 250N000011 HC RX IP 250 OP 636: Performed by: PSYCHIATRY & NEUROLOGY

## 2024-09-22 PROCEDURE — 74176 CT ABD & PELVIS W/O CONTRAST: CPT | Mod: 26 | Performed by: RADIOLOGY

## 2024-09-22 PROCEDURE — 124N000002 HC R&B MH UMMC

## 2024-09-22 PROCEDURE — 99232 SBSQ HOSP IP/OBS MODERATE 35: CPT

## 2024-09-22 PROCEDURE — 250N000009 HC RX 250

## 2024-09-22 RX ORDER — CYCLOBENZAPRINE HCL 10 MG
10 TABLET ORAL EVERY 8 HOURS PRN
Status: DISCONTINUED | OUTPATIENT
Start: 2024-09-22 | End: 2024-09-24 | Stop reason: HOSPADM

## 2024-09-22 RX ORDER — TRAMADOL HYDROCHLORIDE 50 MG/1
50 TABLET ORAL ONCE
Status: COMPLETED | OUTPATIENT
Start: 2024-09-23 | End: 2024-09-23

## 2024-09-22 RX ORDER — LACTULOSE 10 G/15ML
20 SOLUTION ORAL DAILY PRN
Status: DISCONTINUED | OUTPATIENT
Start: 2024-09-22 | End: 2024-09-24 | Stop reason: HOSPADM

## 2024-09-22 RX ORDER — ARIPIPRAZOLE 10 MG/1
10 TABLET ORAL EVERY MORNING
Status: DISCONTINUED | OUTPATIENT
Start: 2024-09-23 | End: 2024-09-24 | Stop reason: HOSPADM

## 2024-09-22 RX ORDER — LIDOCAINE 4 G/G
1 PATCH TOPICAL
Status: DISCONTINUED | OUTPATIENT
Start: 2024-09-22 | End: 2024-09-24 | Stop reason: HOSPADM

## 2024-09-22 RX ORDER — LIDOCAINE HYDROCHLORIDE 20 MG/ML
JELLY TOPICAL ONCE
Status: COMPLETED | OUTPATIENT
Start: 2024-09-22 | End: 2024-09-22

## 2024-09-22 RX ADMIN — CYCLOBENZAPRINE 10 MG: 10 TABLET, FILM COATED ORAL at 21:31

## 2024-09-22 RX ADMIN — OLANZAPINE 10 MG: 10 TABLET, ORALLY DISINTEGRATING ORAL at 11:59

## 2024-09-22 RX ADMIN — SENNOSIDES AND DOCUSATE SODIUM 1 TABLET: 50; 8.6 TABLET ORAL at 22:43

## 2024-09-22 RX ADMIN — PAROXETINE 20 MG: 10 TABLET, FILM COATED ORAL at 08:07

## 2024-09-22 RX ADMIN — LIDOCAINE HYDROCHLORIDE: 20 JELLY TOPICAL at 20:30

## 2024-09-22 RX ADMIN — INSULIN GLARGINE 5 UNITS: 100 INJECTION, SOLUTION SUBCUTANEOUS at 08:08

## 2024-09-22 RX ADMIN — HYDROXYZINE HYDROCHLORIDE 25 MG: 25 TABLET, FILM COATED ORAL at 10:46

## 2024-09-22 RX ADMIN — Medication 1 SPRAY: at 23:59

## 2024-09-22 RX ADMIN — NICOTINE POLACRILEX 2 MG: 2 GUM, CHEWING ORAL at 07:14

## 2024-09-22 RX ADMIN — QUETIAPINE FUMARATE 250 MG: 200 TABLET ORAL at 21:36

## 2024-09-22 RX ADMIN — ONDANSETRON 4 MG: 4 TABLET, ORALLY DISINTEGRATING ORAL at 23:49

## 2024-09-22 RX ADMIN — NICOTINE POLACRILEX 2 MG: 2 GUM, CHEWING ORAL at 10:46

## 2024-09-22 RX ADMIN — SENNOSIDES AND DOCUSATE SODIUM 1 TABLET: 8.6; 5 TABLET ORAL at 21:37

## 2024-09-22 RX ADMIN — ACETAMINOPHEN 650 MG: 325 TABLET ORAL at 00:10

## 2024-09-22 RX ADMIN — Medication 15 MG: at 08:07

## 2024-09-22 RX ADMIN — NICOTINE POLACRILEX 2 MG: 2 GUM, CHEWING ORAL at 13:25

## 2024-09-22 RX ADMIN — NICOTINE POLACRILEX 2 MG: 2 GUM, CHEWING ORAL at 21:31

## 2024-09-22 RX ADMIN — LITHIUM CARBONATE 900 MG: 450 TABLET, EXTENDED RELEASE ORAL at 21:37

## 2024-09-22 RX ADMIN — GABAPENTIN 1200 MG: 400 CAPSULE ORAL at 00:16

## 2024-09-22 RX ADMIN — AMLODIPINE BESYLATE 10 MG: 5 TABLET ORAL at 08:07

## 2024-09-22 RX ADMIN — NICOTINE POLACRILEX 2 MG: 2 GUM, CHEWING ORAL at 18:38

## 2024-09-22 RX ADMIN — NICOTINE 1 PATCH: 21 PATCH, EXTENDED RELEASE TRANSDERMAL at 08:07

## 2024-09-22 RX ADMIN — QUETIAPINE FUMARATE 250 MG: 200 TABLET ORAL at 00:13

## 2024-09-22 RX ADMIN — ROSUVASTATIN 40 MG: 20 TABLET, FILM COATED ORAL at 08:07

## 2024-09-22 RX ADMIN — TESTOSTERONE 100 MG OF TESTOSTERONE: 50 GEL TRANSDERMAL at 08:06

## 2024-09-22 RX ADMIN — BACITRACIN: 500 OINTMENT TOPICAL at 08:09

## 2024-09-22 RX ADMIN — GABAPENTIN 1200 MG: 400 CAPSULE ORAL at 20:14

## 2024-09-22 RX ADMIN — GABAPENTIN 1200 MG: 400 CAPSULE ORAL at 08:06

## 2024-09-22 RX ADMIN — CLONAZEPAM 0.5 MG: 0.5 TABLET ORAL at 18:38

## 2024-09-22 RX ADMIN — ACETAMINOPHEN 650 MG: 325 TABLET ORAL at 11:24

## 2024-09-22 RX ADMIN — NICOTINE POLACRILEX 2 MG: 2 GUM, CHEWING ORAL at 16:54

## 2024-09-22 RX ADMIN — NICOTINE POLACRILEX 2 MG: 2 GUM, CHEWING ORAL at 08:43

## 2024-09-22 RX ADMIN — POLYETHYLENE GLYCOL 3350 17 G: 17 POWDER, FOR SOLUTION ORAL at 08:06

## 2024-09-22 RX ADMIN — ACETAMINOPHEN 650 MG: 325 TABLET ORAL at 19:50

## 2024-09-22 RX ADMIN — SENNOSIDES AND DOCUSATE SODIUM 1 TABLET: 50; 8.6 TABLET ORAL at 00:16

## 2024-09-22 RX ADMIN — PALIPERIDONE 3 MG: 3 TABLET, EXTENDED RELEASE ORAL at 08:07

## 2024-09-22 RX ADMIN — LITHIUM CARBONATE 900 MG: 450 TABLET, EXTENDED RELEASE ORAL at 00:13

## 2024-09-22 RX ADMIN — LIDOCAINE 1 PATCH: 4 PATCH TOPICAL at 13:25

## 2024-09-22 RX ADMIN — GABAPENTIN 600 MG: 600 TABLET, FILM COATED ORAL at 13:33

## 2024-09-22 RX ADMIN — CLONAZEPAM 0.5 MG: 0.5 TABLET ORAL at 09:29

## 2024-09-22 RX ADMIN — EMPAGLIFLOZIN 10 MG: 10 TABLET, FILM COATED ORAL at 08:07

## 2024-09-22 ASSESSMENT — ACTIVITIES OF DAILY LIVING (ADL)
ADLS_ACUITY_SCORE: 63
ORAL_HYGIENE: INDEPENDENT
HYGIENE/GROOMING: INDEPENDENT
LAUNDRY: WITH SUPERVISION
ADLS_ACUITY_SCORE: 63
DRESS: INDEPENDENT
ADLS_ACUITY_SCORE: 63
ADLS_ACUITY_SCORE: 53
ADLS_ACUITY_SCORE: 53
ADLS_ACUITY_SCORE: 63

## 2024-09-22 NOTE — PROGRESS NOTES
"Medicine Progress Note    Contacted by nursing regarding constipation and urine retention.     Today's vital signs, medications, and nursing notes were reviewed.    BP (!) 138/92   Pulse 98   Temp 97.8  F (36.6  C) (Oral)   Resp 16   Ht 1.676 m (5' 6\")   Wt 101.1 kg (222 lb 12.8 oz)   LMP  (LMP Unknown)   SpO2 95%   BMI 35.96 kg/m    General: A&O. NAD.     A/P:    Urine retention  Hx of nephrolithiasis   Had an episode of urinary retention on 9/18.  Bladder scan showed 822 mls and had a straight cath for 1200 mLs. Was able to urinate on own until recurrence 9/21.  Does have a history of neurogenic bladder from a back injury.   UA normal no infection. Has had to be straight cathed about every 8 hours since. Has consistently had 1L-1800 mL of urine retained. Sent a message to Urology for heads up about consult, can see patient Monday. Also now c/o of L flank pain, costovertebral tenderness on deep palpation. No hematuria, fevers.   - note: the following medications can cause urinary retention (messaged psychiatrist)              - Abilify: recent dose increase on 9/14, 5 mg -> 10 mg               - Paxil: recent dose increase on 9/18, 10 mg -> 20 mg               - Clonazepam: recent dose increase on 9/17, 0.25 mg -> 0.5 mg   - Place indwelling ferreira and leave in to decompress bladder  - CT abd/pelvis to assess for kidney stone and hydronephrosis  - Treating constipation as below  - Consult to Urology placed     Constipation  BRBPR  Pt reported he had not had a bowel movement for about 8 days. He received a suppository and had a moderate size bowel movement. He states there was quite a bit of blood in the toilet and on the toilet paper. Does not have a history of hemorrhoids. Denies pain, pruritus. VSS. No dizziness, lightheadedness. CBC normal. No signs of bleeding today. Took dulcolax 10 mg PO 9/21 and lactulose 9/22. Had a large BM that was hard and noted traces of blood again in stool.   - Lactulose 20 g " daily PRN   - Miralax once daily  - Schedule senna at bedtime and continue PRN BID   - Witch hazel pads q1h PRN hemorrhoid discomfort   - Closely monitor and notify medicine with blood in bowel movements continues and worsens.     Laceration x 2 left lower arm  Self injury   Self punctured his left arm using a steak knife x 2. Came in with SI, but denies this was part of the suicide attempt. One suture per laceration placed in the ED on 9/13. No surrounding erythema or swelling.  No drainage noted. 2 Sutures removed 9/22/24. Pt tolerated well. Lacerations approximated, closed, healing. No s/s of infection.     Diabetes mellitus, type 2  Well controlled. Hb A1c 6.7 8/7/24. Current regimen: Jardiance and Rybelsus (semaglutide). Hx of being on long acting insulin.  He does not have means to get his Rybelsus brought into the hospital.  We discussed sliding scale insulin in place and patient would prefer not to have his blood sugars check that frequently.  He is requesting to substitute long-acting insulin for current stay.   - Blood glucose 3 times daily before meals  - Continue PTA Jardiance  - Substitute Lantus 5 mg in place of Rybelsus  - Hypoglycemia protocol    Recent Labs   Lab 09/22/24  0757 09/21/24  1806 09/21/24  1218 09/21/24  0757 09/20/24  1734 09/20/24  1211   * 143* 148* 143* 237* 117*         BROOKLYN Bailey CNP  Internal Medicine [facility] Hospitalist  Securely message with the Vocera Web Console (learn more here)  Text paging via [employer] is appreciated  September 20, 2024
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
## 2024-09-22 NOTE — PLAN OF CARE
"  Problem: Adult Inpatient Plan of Care  Goal: Optimal Comfort and Wellbeing  Outcome: Progressing   Goal Outcome Evaluation:           Pt. Continues on a SIO for self injury risk    Pt. Presents with a anxious and flat affect, reporting high anxiety over his urinary issues PRN Hydroxyzine administered, pt pleasant on approach and frequently seeks out staff with multiple request.       Pt. Bladder scanned @0930 640cc medical flyer straight cath 1450cc of urine @54030, pt. seen by internal medicine who placed an order for a indwelling catheter, Urology consult placed. Staples removed from lower arm, pt. Tolerated well. Pt. Reports discomfort lower pack , PRN hot pack PRN Tylenol helpful, shortly after pt reports not feeling good and losing her S**, PRN Zyprexa administered. Pt. States that she wants to start banging her head against the wall and agreed to get out of bed and pace the hallway with staff.    Pt. Rates anxiety and depression 8/10, pt. Endorses SI thought with a plan to overdose, denies HI/SIB and hallucinations, medication compliance hourly request for nicotine gum, and contracted for safety.      Medical Flyer aware of Styles cath placement.     AM BS - 140  Noon BS - 91     Blood pressure (!) 138/92, pulse 98, temperature 97.8  F (36.6  C), temperature source Oral, resp. rate 16, height 1.676 m (5' 6\"), weight 101.1 kg (222 lb 12.8 oz), SpO2 95%, not currently breastfeeding.                 "

## 2024-09-22 NOTE — PHARMACY-VANCOMYCIN DOSING SERVICE
Entered patient chart to look at ferreira orders per C flyer request. RN needed assistance with ferreira orders.

## 2024-09-22 NOTE — PLAN OF CARE
Problem: Sleep Disturbance  Goal: Adequate Sleep/Rest  Outcome: Progressing  Intervention: Promote Sleep/Rest  Flowsheets (Taken 9/22/2024 0229)  Sleep/Rest Enhancement:   awakenings minimized   room darkened   noise level reduced     Goal Outcome Evaluation:    Patient sitting in the lounge at the start of the shift. Patient stating complaints of urinary retention and is waiting for the medical flyer to arrive to perform and straight catheterization. Patient appeared to be comfortably asleep and breathing with ease throughout the shift after the catheterization was completed. Patient slept for 5.5 hours of the over night shift with no s/s of acute distress.    SIO in place according to order. Patient experienced no safety events or escalations during the shift, no concerns reported or observed. Safety checks completed at least every 15 minutes. Patient required PRN Tylenol 650 mg, see MAR. Patient has a no roommate order because they remain on and SIO for SIB and suicide precautions. Patient also continues on voiding protocol. Nursing will continue to monitor.

## 2024-09-22 NOTE — PROGRESS NOTES
Medical flyer's did a straight cath. Output was 1000 mL. Patient reported having some light bleeding from vaginal insertion site.

## 2024-09-22 NOTE — PLAN OF CARE
"Patient has spent time going in between his room and the milieu. Attended the evening group. Patient's affect appears brighter. Continues to be on an SIO for suicidal and self injurious behavior. Reports being anxious, requested prn klonopin. Continues to experience auditory hallucinations. Ate dinner and snack. States that he was unable to urinate again this evening. A second bladder scan was done and had an output of 1,124 mL. Will notify the medical flyer to straight cath patient. Pleasant and cooperative on the unit.      Patient evaluation continues. Assessed mood,anxiety,thoughts and behavior.     Patient gradually progressing towards goals.    Patient is encouraged to participate in groups and assisted to develop healthy coping skills.     VS reviewed: BP (!) 138/92   Pulse 98   Temp 98.1  F (36.7  C) (Temporal)   Resp 16   Ht 1.676 m (5' 6\")   Wt 101.1 kg (222 lb 12.8 oz)   LMP  (LMP Unknown)   SpO2 95%   BMI 35.96 kg/m      Length of stay: 5    Refer to daily team meeting notes for individualized plan of care. Nursing will continue to assess.      "

## 2024-09-23 ENCOUNTER — PATIENT OUTREACH (OUTPATIENT)
Dept: CARE COORDINATION | Facility: CLINIC | Age: 34
End: 2024-09-23
Payer: MEDICARE

## 2024-09-23 VITALS
DIASTOLIC BLOOD PRESSURE: 87 MMHG | RESPIRATION RATE: 16 BRPM | SYSTOLIC BLOOD PRESSURE: 123 MMHG | BODY MASS INDEX: 35.81 KG/M2 | HEIGHT: 66 IN | OXYGEN SATURATION: 96 % | WEIGHT: 222.8 LBS | TEMPERATURE: 98.3 F | HEART RATE: 96 BPM

## 2024-09-23 LAB
ALBUMIN SERPL BCG-MCNC: 4.5 G/DL (ref 3.5–5.2)
ALP SERPL-CCNC: 80 U/L (ref 40–150)
ALT SERPL W P-5'-P-CCNC: 45 U/L (ref 0–70)
ANION GAP SERPL CALCULATED.3IONS-SCNC: 10 MMOL/L (ref 7–15)
AST SERPL W P-5'-P-CCNC: 38 U/L (ref 0–45)
BASOPHILS # BLD AUTO: 0.1 10E3/UL (ref 0–0.2)
BASOPHILS NFR BLD AUTO: 1 %
BILIRUB DIRECT SERPL-MCNC: <0.2 MG/DL (ref 0–0.3)
BILIRUB SERPL-MCNC: 0.2 MG/DL
BUN SERPL-MCNC: 17.7 MG/DL (ref 6–20)
CALCIUM SERPL-MCNC: 9.7 MG/DL (ref 8.8–10.4)
CHLORIDE SERPL-SCNC: 104 MMOL/L (ref 98–107)
CREAT SERPL-MCNC: 0.69 MG/DL (ref 0.51–1.17)
EGFRCR SERPLBLD CKD-EPI 2021: >90 ML/MIN/1.73M2
EOSINOPHIL # BLD AUTO: 0.5 10E3/UL (ref 0–0.7)
EOSINOPHIL NFR BLD AUTO: 5 %
ERYTHROCYTE [DISTWIDTH] IN BLOOD BY AUTOMATED COUNT: 13.8 % (ref 10–15)
GLUCOSE BLDC GLUCOMTR-MCNC: 149 MG/DL (ref 70–99)
GLUCOSE BLDC GLUCOMTR-MCNC: 165 MG/DL (ref 70–99)
GLUCOSE SERPL-MCNC: 221 MG/DL (ref 70–99)
HCO3 SERPL-SCNC: 24 MMOL/L (ref 22–29)
HCT VFR BLD AUTO: 42.1 % (ref 35–53)
HGB BLD-MCNC: 14.4 G/DL (ref 11.7–17.7)
IMM GRANULOCYTES # BLD: 0.1 10E3/UL
IMM GRANULOCYTES NFR BLD: 1 %
LYMPHOCYTES # BLD AUTO: 3.7 10E3/UL (ref 0.8–5.3)
LYMPHOCYTES NFR BLD AUTO: 37 %
MCH RBC QN AUTO: 28.8 PG (ref 26.5–33)
MCHC RBC AUTO-ENTMCNC: 34.2 G/DL (ref 31.5–36.5)
MCV RBC AUTO: 84 FL (ref 78–100)
MONOCYTES # BLD AUTO: 0.6 10E3/UL (ref 0–1.3)
MONOCYTES NFR BLD AUTO: 6 %
NEUTROPHILS # BLD AUTO: 5 10E3/UL (ref 1.6–8.3)
NEUTROPHILS NFR BLD AUTO: 51 %
NRBC # BLD AUTO: 0 10E3/UL
NRBC BLD AUTO-RTO: 0 /100
PLATELET # BLD AUTO: 273 10E3/UL (ref 150–450)
POTASSIUM SERPL-SCNC: 4.3 MMOL/L (ref 3.4–5.3)
PROT SERPL-MCNC: 7.6 G/DL (ref 6.4–8.3)
RBC # BLD AUTO: 5 10E6/UL (ref 3.8–5.9)
SODIUM SERPL-SCNC: 138 MMOL/L (ref 135–145)
WBC # BLD AUTO: 10 10E3/UL (ref 4–11)

## 2024-09-23 PROCEDURE — 250N000013 HC RX MED GY IP 250 OP 250 PS 637: Performed by: FAMILY MEDICINE

## 2024-09-23 PROCEDURE — 250N000013 HC RX MED GY IP 250 OP 250 PS 637: Performed by: NURSE PRACTITIONER

## 2024-09-23 PROCEDURE — 250N000013 HC RX MED GY IP 250 OP 250 PS 637: Performed by: PSYCHIATRY & NEUROLOGY

## 2024-09-23 PROCEDURE — 250N000013 HC RX MED GY IP 250 OP 250 PS 637

## 2024-09-23 PROCEDURE — 80053 COMPREHEN METABOLIC PANEL: CPT | Performed by: STUDENT IN AN ORGANIZED HEALTH CARE EDUCATION/TRAINING PROGRAM

## 2024-09-23 PROCEDURE — 85025 COMPLETE CBC W/AUTO DIFF WBC: CPT | Performed by: STUDENT IN AN ORGANIZED HEALTH CARE EDUCATION/TRAINING PROGRAM

## 2024-09-23 PROCEDURE — 250N000011 HC RX IP 250 OP 636: Performed by: STUDENT IN AN ORGANIZED HEALTH CARE EDUCATION/TRAINING PROGRAM

## 2024-09-23 PROCEDURE — 99418 PROLNG IP/OBS E/M EA 15 MIN: CPT | Mod: FS | Performed by: INTERNAL MEDICINE

## 2024-09-23 PROCEDURE — 99207 PR APP CREDIT; MD BILLING SHARED VISIT: CPT | Mod: FS | Performed by: NURSE PRACTITIONER

## 2024-09-23 PROCEDURE — 82248 BILIRUBIN DIRECT: CPT | Performed by: STUDENT IN AN ORGANIZED HEALTH CARE EDUCATION/TRAINING PROGRAM

## 2024-09-23 PROCEDURE — 99223 1ST HOSP IP/OBS HIGH 75: CPT | Mod: FS | Performed by: INTERNAL MEDICINE

## 2024-09-23 PROCEDURE — 99239 HOSP IP/OBS DSCHRG MGMT >30: CPT | Performed by: PSYCHIATRY & NEUROLOGY

## 2024-09-23 PROCEDURE — 80048 BASIC METABOLIC PNL TOTAL CA: CPT | Performed by: STUDENT IN AN ORGANIZED HEALTH CARE EDUCATION/TRAINING PROGRAM

## 2024-09-23 PROCEDURE — 250N000011 HC RX IP 250 OP 636: Performed by: PSYCHIATRY & NEUROLOGY

## 2024-09-23 PROCEDURE — 250N000013 HC RX MED GY IP 250 OP 250 PS 637: Performed by: STUDENT IN AN ORGANIZED HEALTH CARE EDUCATION/TRAINING PROGRAM

## 2024-09-23 PROCEDURE — 36415 COLL VENOUS BLD VENIPUNCTURE: CPT | Performed by: STUDENT IN AN ORGANIZED HEALTH CARE EDUCATION/TRAINING PROGRAM

## 2024-09-23 RX ORDER — NALOXONE HYDROCHLORIDE 0.4 MG/ML
0.4 INJECTION, SOLUTION INTRAMUSCULAR; INTRAVENOUS; SUBCUTANEOUS
Status: DISCONTINUED | OUTPATIENT
Start: 2024-09-23 | End: 2024-09-24 | Stop reason: HOSPADM

## 2024-09-23 RX ORDER — KETOROLAC TROMETHAMINE 15 MG/ML
15 INJECTION, SOLUTION INTRAMUSCULAR; INTRAVENOUS ONCE
Status: DISCONTINUED | OUTPATIENT
Start: 2024-09-23 | End: 2024-09-23

## 2024-09-23 RX ORDER — TRAMADOL HYDROCHLORIDE 50 MG/1
50 TABLET ORAL EVERY 6 HOURS PRN
Status: DISCONTINUED | OUTPATIENT
Start: 2024-09-23 | End: 2024-09-23

## 2024-09-23 RX ORDER — TAMSULOSIN HYDROCHLORIDE 0.4 MG/1
0.4 CAPSULE ORAL DAILY
Status: DISCONTINUED | OUTPATIENT
Start: 2024-09-23 | End: 2024-09-24 | Stop reason: HOSPADM

## 2024-09-23 RX ORDER — KETOROLAC TROMETHAMINE 15 MG/ML
15 INJECTION, SOLUTION INTRAMUSCULAR; INTRAVENOUS EVERY 6 HOURS PRN
Status: DISCONTINUED | OUTPATIENT
Start: 2024-09-23 | End: 2024-09-23

## 2024-09-23 RX ORDER — HYDROMORPHONE HYDROCHLORIDE 1 MG/ML
0.3 INJECTION, SOLUTION INTRAMUSCULAR; INTRAVENOUS; SUBCUTANEOUS EVERY 4 HOURS PRN
Status: DISCONTINUED | OUTPATIENT
Start: 2024-09-23 | End: 2024-09-24 | Stop reason: HOSPADM

## 2024-09-23 RX ORDER — ACETAMINOPHEN 325 MG/1
650 TABLET ORAL EVERY 6 HOURS
Status: DISCONTINUED | OUTPATIENT
Start: 2024-09-23 | End: 2024-09-24 | Stop reason: HOSPADM

## 2024-09-23 RX ORDER — NALOXONE HYDROCHLORIDE 0.4 MG/ML
0.2 INJECTION, SOLUTION INTRAMUSCULAR; INTRAVENOUS; SUBCUTANEOUS
Status: DISCONTINUED | OUTPATIENT
Start: 2024-09-23 | End: 2024-09-24 | Stop reason: HOSPADM

## 2024-09-23 RX ORDER — ONDANSETRON 4 MG/1
4-8 TABLET, ORALLY DISINTEGRATING ORAL EVERY 6 HOURS PRN
Status: DISCONTINUED | OUTPATIENT
Start: 2024-09-23 | End: 2024-09-24 | Stop reason: HOSPADM

## 2024-09-23 RX ORDER — OXYCODONE HYDROCHLORIDE 5 MG/1
5-10 TABLET ORAL EVERY 4 HOURS PRN
Status: DISCONTINUED | OUTPATIENT
Start: 2024-09-23 | End: 2024-09-24 | Stop reason: HOSPADM

## 2024-09-23 RX ADMIN — EMPAGLIFLOZIN 10 MG: 10 TABLET, FILM COATED ORAL at 10:11

## 2024-09-23 RX ADMIN — TAMSULOSIN HYDROCHLORIDE 0.4 MG: 0.4 CAPSULE ORAL at 10:10

## 2024-09-23 RX ADMIN — NICOTINE POLACRILEX 2 MG: 2 GUM, CHEWING ORAL at 14:54

## 2024-09-23 RX ADMIN — NICOTINE POLACRILEX 2 MG: 2 GUM, CHEWING ORAL at 17:32

## 2024-09-23 RX ADMIN — OXYCODONE HYDROCHLORIDE 5 MG: 5 TABLET ORAL at 14:53

## 2024-09-23 RX ADMIN — HYDROMORPHONE HYDROCHLORIDE 0.3 MG: 1 INJECTION, SOLUTION INTRAMUSCULAR; INTRAVENOUS; SUBCUTANEOUS at 13:57

## 2024-09-23 RX ADMIN — ROSUVASTATIN 40 MG: 20 TABLET, FILM COATED ORAL at 10:12

## 2024-09-23 RX ADMIN — HYDROMORPHONE HYDROCHLORIDE 0.3 MG: 1 INJECTION, SOLUTION INTRAMUSCULAR; INTRAVENOUS; SUBCUTANEOUS at 09:03

## 2024-09-23 RX ADMIN — GABAPENTIN 600 MG: 600 TABLET, FILM COATED ORAL at 14:17

## 2024-09-23 RX ADMIN — ACETAMINOPHEN 650 MG: 325 TABLET ORAL at 11:16

## 2024-09-23 RX ADMIN — GABAPENTIN 1200 MG: 400 CAPSULE ORAL at 10:13

## 2024-09-23 RX ADMIN — NICOTINE 1 PATCH: 21 PATCH, EXTENDED RELEASE TRANSDERMAL at 08:55

## 2024-09-23 RX ADMIN — OXYCODONE HYDROCHLORIDE 10 MG: 5 TABLET ORAL at 23:03

## 2024-09-23 RX ADMIN — NICOTINE POLACRILEX 2 MG: 2 GUM, CHEWING ORAL at 16:10

## 2024-09-23 RX ADMIN — ONDANSETRON 4 MG: 4 TABLET, ORALLY DISINTEGRATING ORAL at 08:55

## 2024-09-23 RX ADMIN — ACETAMINOPHEN 650 MG: 325 TABLET ORAL at 17:32

## 2024-09-23 RX ADMIN — GABAPENTIN 1200 MG: 400 CAPSULE ORAL at 23:00

## 2024-09-23 RX ADMIN — AMLODIPINE BESYLATE 10 MG: 5 TABLET ORAL at 10:12

## 2024-09-23 RX ADMIN — OXYCODONE HYDROCHLORIDE 5 MG: 5 TABLET ORAL at 16:10

## 2024-09-23 RX ADMIN — NICOTINE POLACRILEX 2 MG: 2 GUM, CHEWING ORAL at 12:22

## 2024-09-23 RX ADMIN — KETOROLAC TROMETHAMINE 15 MG: 15 INJECTION, SOLUTION INTRAMUSCULAR; INTRAVENOUS at 04:02

## 2024-09-23 RX ADMIN — QUETIAPINE FUMARATE 250 MG: 200 TABLET ORAL at 23:00

## 2024-09-23 RX ADMIN — PAROXETINE 20 MG: 10 TABLET, FILM COATED ORAL at 10:11

## 2024-09-23 RX ADMIN — INSULIN GLARGINE 5 UNITS: 100 INJECTION, SOLUTION SUBCUTANEOUS at 09:00

## 2024-09-23 RX ADMIN — NICOTINE POLACRILEX 2 MG: 2 GUM, CHEWING ORAL at 08:29

## 2024-09-23 RX ADMIN — NICOTINE POLACRILEX 2 MG: 2 GUM, CHEWING ORAL at 13:48

## 2024-09-23 RX ADMIN — SENNOSIDES AND DOCUSATE SODIUM 1 TABLET: 50; 8.6 TABLET ORAL at 23:01

## 2024-09-23 RX ADMIN — CLONAZEPAM 0.5 MG: 0.5 TABLET ORAL at 00:29

## 2024-09-23 RX ADMIN — ARIPIPRAZOLE 10 MG: 10 TABLET ORAL at 10:11

## 2024-09-23 RX ADMIN — TRAMADOL HYDROCHLORIDE 50 MG: 50 TABLET, COATED ORAL at 02:06

## 2024-09-23 RX ADMIN — POLYETHYLENE GLYCOL 3350 17 G: 17 POWDER, FOR SOLUTION ORAL at 11:05

## 2024-09-23 RX ADMIN — LITHIUM CARBONATE 900 MG: 450 TABLET, EXTENDED RELEASE ORAL at 23:00

## 2024-09-23 RX ADMIN — PALIPERIDONE 3 MG: 3 TABLET, EXTENDED RELEASE ORAL at 10:13

## 2024-09-23 RX ADMIN — KETOROLAC TROMETHAMINE 15 MG: 15 INJECTION, SOLUTION INTRAMUSCULAR; INTRAVENOUS at 12:07

## 2024-09-23 RX ADMIN — CLONAZEPAM 0.5 MG: 0.5 TABLET ORAL at 12:21

## 2024-09-23 ASSESSMENT — ACTIVITIES OF DAILY LIVING (ADL)
ADLS_ACUITY_SCORE: 53
ADLS_ACUITY_SCORE: 53
HYGIENE/GROOMING: INDEPENDENT
ADLS_ACUITY_SCORE: 53
ORAL_HYGIENE: INDEPENDENT
ADLS_ACUITY_SCORE: 53
ADLS_ACUITY_SCORE: 53
DRESS: INDEPENDENT
ADLS_ACUITY_SCORE: 53
LAUNDRY: WITH SUPERVISION
ADLS_ACUITY_SCORE: 53

## 2024-09-23 NOTE — H&P
History and Physical - Hospitalist Service       Date of Admission:  (Not on file)    Assessment & Plan      Prakash Prasad is a 34 year old female to 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 adult with past medical history significant for TBI, type 2 DM, urinary retention, neurogenic bladder, GERD, ADHD, bipolar 1 disorder, polysubstance use disorder, borderline personality disorder, and schizoaffective disorder initially admitted to station [facility] on 9/13/24 for suicidal ideation.  He developed worsening flank pain on 9/22 along with reported tea colored urine.  He is transferred to internal medicine service for management of flank pain refractory to oral medications.        # Bilateral flank pain   # Non-obstructive renal calculi   # Hx nephrolithiasis  Reports onset yesterday evening and unable to sleep overnight.  Reports 10/10 flank pain and lower back pain, accompanied by nausea, no vomiting.  CT AP evening 9/22 with bilateral non-obstructing renal calculi (2mm R lower pole, 3mm L upper pole), no hydronephrosis, markedly distended urinary bladder, no bladder wall thickening or vesical calculi, and mild hepatic steatosis.  Renal function wnl, no leukocytosis, and no fevers.  - Admit to medicine for pain control    - IV Dilaudid 0.3-0.5mg Q3H PRN, transition as able to PO oxycodone  - Continue scheduled APAP for pain  - Lidocaine patches to lower back Q24H   - Zofran 4-8mg PO or IV Q6H PRN   - CBC, CMP in AM     # Urinary retention  # Neurogenic bladder c/b cauda equina syndrome in setting of SCI (2016)   # Bladder spasms  Has had issues with urinary retention intermittently since accident in 2016, last seen by Urology in 2019 and discussed CIC vs suprapubic catheter placement.  Developed difficulty urinating evening 9/18. [place] like his bladder was full but hadn't been able to urinate for at least a few hours. Bladder scanned for 822cc, ultimately w/ 1200cc out via straight cath.  Seen by Urology on 9/22, felt that neurogenic bladder  along with anticholinergic medication side effects are contributing to urinary retention, with recommendations for CIC vs ferreira catheter placement for now (opted for ferreira placement).    - Continue tamsulosin 0.4mg daily   - Trial low dose oxybutynin for bladder spasms  - Monitor I/Os  - Continue ferreira with routine cares   - Urology follow up outpatient for urodynamic studies      - Note: the following medications can cause urinary retention              - Abilify: recent dose increase on 9/14, 5 mg -> 10 mg               - Paxil: recent dose increase on 9/18, 10 mg -> 20 mg               - Clonazepam: recent dose increase on 9/17, 0.25 mg -> 0.5 mg       # Constipation   # Neurogenic bowel   # ?Hematochezia vs melena vs hemorrhoids   Reports regular bowel movements at home but having issues with constipation since admission.  PO intake has been limited by pain.  Reports only 2 BMs since being on the unit.  Reports some blood in the toilet and noticeable on his stools.  Pt does not know if he has hemorrhoids.  No stool burden noted on CT AP 9/22.     - Patient care order for RNs to monitor stool appearance   - Increase fiber/fluid intake   - Bowel regimen with scheduled miralax and senna   - Added enema x 1 per pt request  - Continue simethicone PRN   - Check magnesium level     # Suicidal ideation   # Bipolar 1 disorder  # Depression  # ADHD  # Schizoaffective disorder   # Hx polysubstance use disorder (in remission)   Admitted for anxiety, depression, and SI with SIB.  - Psychiatry consult placed for ongoing management  - Continue Abilify 10mg every morning   - Continue Paxil 20mg daily   - Continue Invega 3mg daily   - Continue Gabapentin 1200mg BID   - Continue Lithium 900mg   - Continue Seroquel 250mg HS   - Continue SIO for hx self injurious behavior     # HTN   - Continue PTA amlodipine     # HLD   - Continue PTA statin     # Type 2 DM   Well controlled. Hb A1c 6.7 8/7/24. Current regimen: Jardiance and  "Rybelsus (semaglutide). Hx of being on long acting insulin.  He does not have means to get his Rybelsus brought into the hospital.  We discussed sliding scale insulin in place and patient would prefer not to have his blood sugars check that frequently.  He is requesting to substitute long-acting insulin for now.  - BG checks 3 times daily before meals  - Continue PTA Jardiance  - Substitute Lantus 5 mg in place of PTA Rybelsus  - Hypoglycemia protocol             Diet:  Regular   DVT Prophylaxis: Pneumatic Compression Devices  Styles Catheter: PRESENT, indication:    Lines: None     Cardiac Monitoring: None  Code Status:  FULL     Clinically Significant Risk Factors Present on Admission                  # Hypertension: Noted on problem list        # DMII: A1C = 6.7 % (Ref range: 0.0 - 5.6 %) within past 6 months    # Obesity: Estimated body mass index is 35.96 kg/m  as calculated from the following:    Height as of 9/16/24: 1.676 m (5' 6\").    Weight as of 9/17/24: 101.1 kg (222 lb 12.8 oz).       # Financial/Environmental Concerns:           Disposition Plan     Medically Ready for Discharge: Anticipated in 2-4 Days, likely will return to inpatient psychiatry.          The patient's care was discussed with the Attending Physician, Dr. Tim Liang .    Sydney Subramanian, [facility]  Hospitalist Service    Securely message with Get[employer] (more info)  Text page via [facility] Paging/Directory     ______________________________________________________________________    Chief Complaint   \"10/10 in my back\"    History is obtained from the patient and chart review.     History of Present Illness   Prakash Prasad is a 34 year old female to 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 adult with past medical history significant for TBI, type 2 DM, urinary retention, neurogenic bladder, GERD, ADHD, bipolar 1 disorder, polysubstance use disorder, borderline personality disorder, and schizoaffective disorder initially admitted to station 30 on 9/13/24 for suicidal " ideation.  He developed worsening flank pain on 9/22.  He is transferred to internal medicine service for management of flank pain refractory to oral medications.      Prakash is seen in his room.  He reports bilateral flank pain radiating to lower back that began last evening 9/22.  Rates pain 10/10 and says it kept him up all night.  Reports nausea but no vomiting.  Has been constipated since admission and reports having only 2 BMs since coming to the psych unit. Reports some blood on the toilet paper and on the stool itself.  Unsure if he has hemorrhoids.  Reports some blood in the urine in his ferreira bag.  No dyspnea or chest pain.          Past Medical History    Past Medical History:   Diagnosis Date    ADHD (attention deficit hyperactivity disorder)     Bipolar 1 disorder     Borderline personality disorder     Cauda equina syndrome     Chronic low back pain     Depression     Diabetes mellitus, type 2 (H) 1/19/2023    GERD (gastroesophageal reflux disease)     h/o TBI (traumatic brain injury)     Hypertension, unspecified type 12/16/2021    Marginal corneal ulcer, left 07/17/2015    Nephrolithiasis     obesity     Polysubstance abuse - methamphetamine, hallucinagen, heroin, marijuana     currently in remission    PONV (postoperative nausea and vomiting)     PTSD (post-traumatic stress disorder)        Past Surgical History   Past Surgical History:   Procedure Laterality Date    BACK SURGERY  12/24/2016    BACK SURGERY - For Cauda Equina Syndrome  12/24/2016    COLONOSCOPY      COMBINED CYSTOSCOPY, INSERT STENT URETER(S) Left 8/30/2018    Procedure: COMBINED CYSTOSCOPY, INSERT STENT URETER(S);  Cystoscopy With Left Stent Placement;  Surgeon: Kiran Ulrich MD;  Location: [facility] OR    COMBINED CYSTOSCOPY, RETROGRADES, EXCHANGE STENT URETER(S) Left 10/14/2018    Procedure: COMBINED CYSTOSCOPY, RETROGRADES, EXCHANGE STENT URETER(S);  Cystoscopy and removal of left-sided stent.;  Surgeon: Stiven Olivo  MD Sunil;  Location:  OR    COMBINED CYSTOSCOPY, RETROGRADES, URETEROSCOPY, INSERT STENT  1/6/2014    Procedure: COMBINED CYSTOSCOPY, RETROGRADES, URETEROSCOPY, INSERT STENT;  Cystyoscopy place left ureteral stent;  Surgeon: Jaun Kimble MD;  Location: [facility] OR    COMBINED CYSTOSCOPY, RETROGRADES, URETEROSCOPY, INSERT STENT Left 10/23/2018    Procedure: Video cystoscopy, left ureteroscopy with stone extraction;  Surgeon: Bull Mast MD;  Location:  OR    CYSTOSCOPY, URETEROSCOPY, COMBINED Right 9/23/2015    Procedure: COMBINED CYSTOSCOPY, URETEROSCOPY;  Surgeon: ROME Jett MD;  Location: [facility] OR    ENT SURGERY      ESOPHAGOSCOPY, GASTROSCOPY, DUODENOSCOPY (EGD), COMBINED  4/8/2013    Procedure: COMBINED ESOPHAGOSCOPY, GASTROSCOPY, DUODENOSCOPY (EGD), BIOPSY SINGLE OR MULTIPLE;  Gastroscopy;  Surgeon: Peter Pickard MD;  Location: [facility] GI    ESOPHAGOSCOPY, GASTROSCOPY, DUODENOSCOPY (EGD), COMBINED Left 10/28/2014    Procedure: COMBINED ESOPHAGOSCOPY, GASTROSCOPY, DUODENOSCOPY (EGD), BIOPSY SINGLE OR MULTIPLE;  Surgeon: Narcisa Ramirez MD;  Location:  OR    ESOPHAGOSCOPY, GASTROSCOPY, DUODENOSCOPY (EGD), COMBINED N/A 12/24/2018    Procedure: COMBINED ESOPHAGOSCOPY, GASTROSCOPY, DUODENOSCOPY (EGD), BIOPSY SINGLE OR MULTIPLE;  Surgeon: Sonu Verduzco MD;  Location: [facility]    INJECT EPIDURAL TRANSFORAMINAL LUMBAR / SACRAL EA ADDITIONAL LEVEL Left 3/18/2021    Procedure: Left L5/S1 transforaminal injection for selective L5 nerve root block;  Surgeon: Eliza Pagan MD;  Location: [facility] OR    LAPAROSCOPIC CHOLECYSTECTOMY  11/20/2014    [facility], Dr. Ramirez    LASER HOLMIUM LITHOTRIPSY URETER(S), INSERT STENT, COMBINED  4/2/2014    Procedure: COMBINED CYSTOSCOPY, URETEROSCOPY, LASER HOLMIUM LITHOTRIPSY URETER(S), INSERT STENT;  Cystoscopy,Left Ureteral Stent Removal,Left Ureteroscopy with Laser Lithotripsy,Left Ureter Stent Placement;  Surgeon: ROME Jett MD;  Location: [facility]     Transurethral stone resection  03/11/2011       Prior to Admission Medications   Cannot display prior to admission medications because the patient has not been admitted in this contact.        Review of Systems    The 10 point Review of Systems is negative other than noted in the HPI or here.      Physical Exam   Vital Signs:                    Weight: 0 lbs 0 oz    GENERAL: Alert and oriented x 3. Well nourished, well developed.  No acute distress.    HEENT: Normocephalic, atraumatic. Anicteric sclera. Mucous membranes moist.   CV: RRR. S1, S2. No murmurs appreciated.   RESPIRATORY: Effort normal on room air. Lungs CTAB with no wheezing, rales, or rhonchi.   GI: Abdomen slightly distended, bowel sounds present x all 4 quadrants. No tenderness, rebound, or guarding.   : CVA tenderness noted.  Styles in place.   NEUROLOGICAL: No focal deficits. Follows commands.  Strength equal in upper and lower extremities.   MUSCULOSKELETAL: No joint swelling or tenderness. Moves all extremities.   EXTREMITIES: No gross deformities. No peripheral edema.   SKIN: Grossly warm, dry, and intact. No jaundice. No rashes.       Medical Decision Making       65 MINUTES SPENT BY ME on the date of service doing chart review, history, exam, documentation & further activities per the note.      Data     I have personally reviewed the following data over the past 24 hrs:    10.0  \   14.4   / 273     138 104 17.7 /  149 (H)   4.3 24 0.69 \     ALT: 45 AST: 38 AP: 80 TBILI: 0.2   ALB: 4.5 TOT PROTEIN: 7.6 LIPASE: N/A       Imaging results reviewed over the past 24 hrs:   Recent Results (from the past 24 hour(s))   CT Abdomen Pelvis w/o Contrast    Narrative    EXAMINATION: CT ABDOMEN PELVIS W/O CONTRAST, 9/22/2024 7:34 PM    TECHNIQUE:  Helical CT images from the lung bases through the  symphysis pubis were obtained without IV contrast.     COMPARISON: CT abdomen pelvis 1/16/2022    HISTORY: Rule out kidney stones, hydronephrosis. L flank  pain, urine  retention    FINDINGS:  Kidneys: 2 mm calculus in the right lower pole and 3 mm calculus in  the left upper pole. No obstructing renal calculi or ureteral calculi.  No focal mass, hydronephrosis.  Bladder: Overdistended bladder.    Liver: Mild steatosis. No focal mass.  Biliary system: Gallbladder is surgically absent. No intrahepatic or  extrahepatic biliary ductal dilatation.  Pancreas: No focal mass or dilation of the main pancreatic duct.  Stomach: Within normal limits.  Spleen: Within normal limits.  Adrenal glands: Within normal limits.  Reproductive organs: Within normal limits.  Colon: Within normal limits.  Appendix: Within normal limits.  Small Bowel: Within normal limits.  Lymph nodes: No intra-abdominal or pelvic lymphadenopathy.  Vasculature: no aortic aneurysm.  Peritoneum: No intraabdominal free fluid or air.     Lung bases: Clear.    Bones and soft tissues: No suspicious soft tissue mass or fluid  collection. No suspicious osseous lesion.      Impression    IMPRESSION:   1.  Bilateral nonobstructing renal calculi. No hydronephrosis.  2.  Markedly distended urinary bladder. No bladder wall thickening or  vesical  calculi.   3.  Mild hepatic steatosis.    MOISÉS DAWN MD         SYSTEM ID:  [id number]

## 2024-09-23 NOTE — PLAN OF CARE
Team Note Due:  Tuesday     Assessment/Intervention/Current Symtoms and Care Coordination:  Chart review and met with team, discussed pt progress, symptomology, and response to treatment.  Discussed the discharge plan and any potential impediments to discharge.    Team report:  Pt remains on a 1:1.  He is having command hallucinations to hurt self.  He was reporting auditory hallucinations.  He was having voices that told him to take out his stitches.  Abilify was increased.  Pt has recently been focused on physical things like bowels, wounds, urination.  Pt has developed urologi problems and kideny stones.      Pt is transferring to medical unit today.  TCM was notified       Discharge Plan or Goal:  Return home to [facility] apartment type.     Barriers to Discharge:  Pt continues to report suicidal ideation if        Referral Status:    Psychiatrist/Medication Management:  Name/Organization: Regine Last CNP, APRN [facility]  Phone: [phone]  Fax: [phone]       :  Name/Organization: [facility] - VAL signed  Phone: [phone]  [email or web address]  BERENICE Sifuentes [facility]        Integrated Community Support  Home:  Name/Organization: [facility] Director  [facility] - VAL signed  Phone: [phone]         ARM[facility]:  Name/Organization: Deena Ibarra - VAL signed  Phone: [phone]      CADI Worker:  Name/Organization: Lydia Britt  [facility]  Phone: [phone]         MARK Hampton      Other contact information (family, friends, SO) and VAL status:   Pt reports there isn obody to sign ROIs.           Legal Status:  Commitment  No Dion Sifuentes  File Number: [id number]  Start and expiration date of commitment: ends December 14, 2024      Contacts (include VAL status):    Pt declined to sign any ROIs for anyone.        Upcoming Meetings and Dates/Important Information and next  steps:    Discharge to medical today

## 2024-09-23 NOTE — PLAN OF CARE
"Patient has spent time in between his room and the lounge. Patient states he continues to have anxiety and be depressed.  Has fleeting thoughts of suicidal ideation and self injurious thoughts, with no plan. Feels like they have progressed since being admitted. Continues to have a 24 hour SIO. Output via ferreira is 750 mL. Reports pain is now 7/10. Requested 5 mg oxycodone. Ate dinner and snack. Cooperative on the unit. Affect is flat but brightens on approach.     Patient evaluation continues. Assessed mood,anxiety,thoughts and behavior.     Patient gradually progressing towards goals.    Patient is encouraged to participate in groups and assisted to develop healthy coping skills.     VS reviewed: BP (!) 147/89   Pulse 91   Temp 98  F (36.7  C) (Oral)   Resp 16   Ht 1.676 m (5' 6\")   Wt 101.1 kg (222 lb 12.8 oz)   LMP  (LMP Unknown)   SpO2 96%   BMI 35.96 kg/m      Length of stay: 7    Refer to daily team meeting notes for individualized plan of care. Nursing will continue to assess.    "

## 2024-09-23 NOTE — TELEPHONE ENCOUNTER
Reason for Call:  Other     Detailed comments: Would like orders to resume home care after patients hospital visit discharge. Every two weeks an intermuscular injection of fluphenazine.     Phone Number   [facility] ([facility] [phone]       Best Time:     Can we leave a detailed message on this number? YES    Call taken on 12/15/2021 at 11:22 AM by Benita Shipman       normal/cranial nerves II-XII intact/sensation intact

## 2024-09-23 NOTE — DISCHARGE INSTRUCTIONS
Behavioral Discharge Planning and Instructions    Summary: You were admitted on 9/13/2024  due to  harming yourself in your apartment .  You were treated by Cortez Kruger MD and discharged on 9/23/24 from [facility] to  a medical floor to treat physical health problems.  You will still be under the commitment order while on the medical unit and will need a provisional discharge agreement signed at the time of your discharge.    Main Diagnosis:   Bipolar affective disorder, depressed vs Major depressive disorder, recurrent, severe with psychotic features;   Borderline personality disorder;   Multiple health conditions, see past medical history.       Commitment:   You [facility] dually [facility], [facility] and you are being discharged on a Provisional Discharge Agreement which shall remain in effect for the duration of the Commitment which expires on December 14, 2024.    Health Care Follow-up:   You have medicare and [employer] PMAP.  The [employer] Member Services number is [phone], Behavioral Health is option #4.   Use [employer] - [phone] - for transportation to your health care appointments.      Your outpatient care team is listed below. No appointments were made at this time due to the uncertainty of the timing of your discharge from [facility].      Psychiatrist/Medication Management:  Name/Organization: Regine Last CNP, [facility]  Phone: [phone]  Fax: [phone]        :  Name/Organization: [facility] - VAL signed  Phone: [phone]  [email or web address]  BERENICE DYER         Integrated Community Support  Home:  Name/Organization: [facility] Director  [facility] - VAL signed  Phone: [phone]           [facility]:  Name/Organization: Deena Meng  VAL signed  Phone: [phone]      CADI Worker:  Name/Organization: Lydia Britt  [facility] Services  Phone: [phone]         MARK Hampton        Information will be faxed to your outpatient providers to ensure a healthy continuity of care for you.     Attend all scheduled appointments with your outpatient providers. Call at least 24 hours in advance if you need to reschedule an appointment to ensure continued access to your outpatient providers.     Major Treatments, Procedures and Findings:  You were provided with: a psychiatric assessment, assessed for medical stability, medication evaluation and/or management, group therapy, milieu management, and medical interventions    You were evaluated and treated by internal medicine and urology.    You had wound care.    You had lab work. You can look in MyCHart or talk to your doctor about the results.      Symptoms to Report: mood getting worse or thoughts of suicide    Early warning signs can include: increased thoughts or behaviors of suicide or self-harm     Safety and Wellness:  Take all medicines as directed.  Make no changes unless your doctor suggests them.      Follow treatment recommendations.  Refrain from alcohol and non-prescribed drugs.  If there is a concern for safety, call [phone].    Resources:     Call or Text [phone]    For mental health crisis support in [facility], you can contact the following [email or web address]:  24/7 Crisis Response: Call 207-213-5846.  Urgent help: Call the Crisis Response Unit at 414-332-6479.      Mental Health Crisis Resources  Throughout [place]: call **CRISIS (**564977)  Crisis Text Line: is available for free, 24/7 by texting [place] to 257621  Suicide Awareness Voices of Education (SAVE) (www.save.org): 876-160-ZNOH (1916)  The National Suicide Prevention Lifeline is now: 988 Suicide and Crisis Lifeline. Call 988 anytime.  National [place] on Mental Illness (www.[place].jhonatan.org): 304.568.5165 or  185.273.4431.  Ykap7ipwg: text the word LIFE to 58901 for immediate support and crisis intervention  Mental Health Consumer/Survivor Network of [place] (www.mhcsn.net): 823.407.4973 or 830-623-5591  Mental Health Association of [place] (www.mentalhealth.org): 863.839.2273 or 933-175-4799  Peer Support Connection [place] Warmline (PSC) 1-918.269.9992 Available from 5pm - 9am (7 days a week/365 days a year)        General Medication Instructions:   See your medication sheet(s) for instructions.   Take all medicines as directed.  Make no changes unless your doctor suggests them.   Go to all your doctor visits.  Be sure to have all your required lab tests. This way, your medicines can be refilled on time.  Do not use any drugs not prescribed by your doctor.  Avoid alcohol.    Advance Directives:   Scanned document on file with [employer]? No scanned doc  Is document scanned? Pt states no documents  Honoring Choices Your Rights Handout: Informed and given  Was more information offered? Pt declined    The Treatment team has appreciated the opportunity to work with you. If you have any questions or concerns about your recent admission, you can contact the unit which can receive your call 24 hours a day, 7 days a week. They will be able to get in touch with a Provider if needed. The unit number is [phone] .

## 2024-09-23 NOTE — PLAN OF CARE
"Patient has spent time going in between his room and the lounge. Patient was bladder scanned  around 1740,  720 mL. On call flyer paged to put in ferreira. On call hospitalist was messaged and clarified ferreira order. Patient went down to radiology and returned without issue. Upon returning complained of discomfort and pain in there back and bladder. Requested prn tylenol. On call provider was paged due to pain increasing. Prn flexeril was ordered. After ferreira was placed, patient had an output of 2,000 mL. Stated he felt some relief from the ferreira but continues to have lower back pain.   Blood sugar was 192.    States that the voices were strong this evening. Command hallucinations to harm himself. Reports being anxious and depressed. Requested prn klonopin for anxiety. Med compliant. Ate dinner and snack. Affect has been flat and somber. Cooperative on the unit.     Patient evaluation continues. Assessed mood,anxiety,thoughts and behavior.     Patient gradually progressing towards goals.    Patient is encouraged to participate in groups and assisted to develop healthy coping skills.     VS reviewed: /82   Pulse 117   Temp 98.7  F (37.1  C) (Oral)   Resp 16   Ht 1.676 m (5' 6\")   Wt 101.1 kg (222 lb 12.8 oz)   LMP  (LMP Unknown)   SpO2 95%   BMI 35.96 kg/m      Length of stay: 6    Refer to daily team meeting notes for individualized plan of care. Nursing will continue to assess.    "

## 2024-09-23 NOTE — SIGNIFICANT EVENT
Significant Event Note    Time of event: 12:19 AM September 23, 2024    Description of event:  Received a call for RN regarding renal stones noted on the CT abd/pelvis. CT shopwing bilateral nonobstructing renal calculi. No hydronephrosis. 2 mm calculus in the right lower pole and 3 mm calculus in the left upper pole.. Overdistended bladder. Stones small enough that patieng could pass. No obstruction or hydronephrosis. Patient has ferreira in place to decompress bladder.    Nephrolithiasis:  - give 1x dose of toradol  - Will obtain CBCB and bmpp stat. If normal cr, will plan to provide toradol for next 48 hours q6h  - flomax started  - RN notified.      Discussed with: SRINIVAS ZHANG MD

## 2024-09-23 NOTE — PROGRESS NOTES
Brief Urology Consult Note    Mr. Prakash Prasad is a 34 male with history of urinary retention suspected to be 2/2 cauda equina and urolithiasis. Urology was previously consulted for recommendations regarding urinary retention. The patient currently has a ferreira catheter in for bladder drainage. At time of assessment patient reported dull non radiating left flank pain.     I was contacted by the primary team this morning regarding the patients increased pain requiring toradol overnight. The patient had a CT A/P which demonstrated small non-obstructing renal stones. It is possible patient is passing stones however given size I would suspect patient would be successful with trial of MET. He is afebrile and hemodynamically stable, labs are without leukocytosis, and creatinine is at baseline.     I instructed the primary team on my recommendations and if they are not able to accommodate him on the inpatient unit he is currently they could consider reaching to to a hospitalist service to consider management.    - Continue flomax, tylenol, and NSAIDs for MET  - Patient without evidence of infection and recent UA negative - low concern for obstructed urolithiasis given CT A/P was completed yesterday   - Urology will sign off, please reach out with questions or concerns
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
## 2024-09-23 NOTE — PLAN OF CARE
Goal Outcome Evaluation:    Initial meeting note:    Therapist introduced self to patient and discussed psychotherapy service available to patient.     Pt response: Pt not interested currently in meeting 1:1; therapist will continue remaining available for pt     Plan: Pt was encouraged to attend groups and therapist will remain available for 1:1 sessions    Pt said they were in a lot of pain when writer greeted them. They said they were prone to kidney stones and had had many since the age of 13. Writer validated that they were painful.

## 2024-09-23 NOTE — PROGRESS NOTES
Took over care of pt with renal calculi at 0730.Pt with severe kidney flank pain which he is rating at a 10. Pt received Dilaudid 0.3 mg, IV, PRN at 0903, administered by the Gisselle RN. He found no pain relief from this. Pt grimacing with movement. He has been able to ambulate the unit slowly and interact with staff. Pt says it is very hard to concentrate, even as far as trying to describe his emotions. He has even been laying on the floor because it helps with the pain. At 1207 he was administered Toradol 15 mg, IV, PRN by the the Gisselle RN. Pt's pain has remained at a 10 throughout the medication regime this morning. At 1357 pt given Dilaudid 0.3 mg, IV prn. Pt's pain went to an 8. Seen by General Medicine and Oxycodone ordered. Pt given Oxycodone, 5 mg, po, prn at 1453.    Pt with ferreira catheter. Output was 1200 ml at 0900 and 500 ml at 1230. Urine is tiago colored. Keren VICKERS MD from urology called and informed of above. MD asked writer to contact General Medicine to inform of above. General Medicine called and it has been decided pt will be transferred to a medical unit.    Pt states he does have passive SI today with no plan. Complaints also of depression and anxiety. Says these are more in the background of his feelings due to the severe pain.

## 2024-09-23 NOTE — PLAN OF CARE
Behavioral Discharge Planning and Instructions    Summary: You were admitted on 9/13/2024  due to harming yourself in your apartment.  You were treated by Cortez Kruger MD and discharged on 9/23/24 from [facility] to a medical floor to treat physical health problems.  You will still be under the commitment order while on the medical unit and will need a provisional discharge agreement signed at the time of your discharge.    Main Diagnosis:   Bipolar affective disorder, depressed vs Major depressive disorder, recurrent, severe with psychotic features;   Borderline personality disorder;   Multiple health conditions, see past medical history.       Commitment:   [facility] and you are being discharged on a Provisional Discharge Agreement which shall remain in effect for the duration of the Commitment which expires on December 14, 2024.    Health Care Follow-up:   You have medicare and [employer] PMAP.  The [employer] Member Services number is [phone], Behavioral Health is option #4.   Use [employer] - [phone] - for transportation to your health care appointments.      Your outpatient care team is listed below. No appointments were made at this time due to the uncertainty of the timing of your discharge from [facility].      Psychiatrist/Medication Management:  Name/Organization: Regine Last CNP, [facility]  Phone: [phone]  Fax: [phone]        :  Name/Organization: Berenice  Mental Health Resources - VAL signed  Phone: [phone]  [email or web address]  BERENICE DYER         Integrated Community Support  Home:  Name/Organization: [place] [facility] Director  [facility] - VAL signed  Phone: [phone]           [facility]:  Name/Organization: Deena Meng  VAL signed  Phone: [phone]      CADI Worker:  Name/Organization: Lydia Britt  [facility]  Phone: [phone]         MARK Hampton        Information will be faxed to your outpatient providers to ensure a healthy continuity of care for you.     Attend all scheduled appointments with your outpatient providers. Call at least 24 hours in advance if you need to reschedule an appointment to ensure continued access to your outpatient providers.     Major Treatments, Procedures and Findings:  You were provided with: a psychiatric assessment, assessed for medical stability, medication evaluation and/or management, group therapy, milieu management, and medical interventions    You were evaluated and treated by internal medicine and urology.    You had wound care.    You had lab work. You can look in MyCHart or talk to your doctor about the results.      Symptoms to Report: mood getting worse or thoughts of suicide    Early warning signs can include: increased thoughts or behaviors of suicide or self-harm     Safety and Wellness:  Take all medicines as directed.  Make no changes unless your doctor suggests them.      Follow treatment recommendations.  Refrain from alcohol and non-prescribed drugs.  If there is a concern for safety, call [phone].    Resources:     Call or Text [phone]    For mental health crisis support in [facility], you can contact the following [email or web address]:  24/7 Crisis Response: Call [phone].  Urgent help: Call the Crisis Response Unit at 133-807-3266.      Mental Health Crisis Resources  Throughout [place]: call **CRISIS (**033030)  Crisis Text Line: is available for free, 24/7 by texting [place] to 816441  Suicide Awareness Voices of Education (SAVE) (www.save.org): 555-510-IBGM (6027)  The National Suicide Prevention Lifeline is now: 988 Suicide and Crisis Lifeline. Call 988 anytime.  National Elizabeth on Mental Illness (www.[place].jhonatan.org): 781.292.4585 or  624.968.6979.  Gbvi4chvj: text the word LIFE to 97656 for immediate support and crisis intervention  Mental Health Consumer/Survivor Network of [place] (www.mhcsn.net): 513.720.3596 or 973-313-4017  Mental Health Association of [place] (www.mentalhealth.org): 181.136.6735 or 631-415-9793  Peer Support Connection [place] Warmline (PSC) 1-781.683.9085 Available from 5pm - 9am (7 days a week/365 days a year)        General Medication Instructions:   See your medication sheet(s) for instructions.   Take all medicines as directed.  Make no changes unless your doctor suggests them.   Go to all your doctor visits.  Be sure to have all your required lab tests. This way, your medicines can be refilled on time.  Do not use any drugs not prescribed by your doctor.  Avoid alcohol.    Advance Directives:   Scanned document on file with elarm? No scanned doc  Is document scanned? Pt states no documents  Honoring Choices Your Rights Handout: Informed and given  Was more information offered? Pt declined    The Treatment team has appreciated the opportunity to work with you. If you have any questions or concerns about your recent admission, you can contact the unit which can receive your call 24 hours a day, 7 days a week. They will be able to get in touch with a Provider if needed. The unit number is [phone] .

## 2024-09-23 NOTE — PLAN OF CARE
BEH IP Unit Acuity Rating Score (UARS)  Patient is given one point for every criteria they meet.    CRITERIA SCORING   On a 72 hour hold, court hold, committed, stay of commitment, or revocation. 1    Patient LOS on BEH unit exceeds 20 days. 0  LOS: 7   Patient under guardianship, 55+, otherwise medically complex, or under age 11. 1   Suicide ideation without relief of precipitating factors. 1   Current plan for suicide. 0   Current plan for homicide. 0   Imminent risk or actual attempt to seriously harm another without relief of factors precipitating the attempt. 0   Severe dysfunction in daily living (ex: complete neglect for self care, extreme disruption in vegetative function, extreme deterioration in social interactions). 0   Recent (last 7 days) or current physical aggression in the ED or on unit. 0   Restraints or seclusion episode in past 72 hours. 0   Recent (last 7 days) or current verbal aggression, agitation, yelling, etc., while in the ED or unit. 0   Active psychosis. 0   Need for constant or near constant redirection (from leaving, from others, etc).  0   Intrusive or disruptive behaviors. 0   Patient requires 3 or more hours of individualized nursing care per 8-hour shift (i.e. for ADLs, meds, therapeutic interventions). 1   TOTAL 5

## 2024-09-23 NOTE — PROGRESS NOTES
Patient had an output of 750 mL from the ferreira catheter. Small blood clot noted. Urine was a dark yellow color.

## 2024-09-24 ENCOUNTER — HOSPITAL ENCOUNTER (INPATIENT)
Facility: CLINIC | Age: 34
LOS: 3 days | Discharge: HOME | DRG: 694 | End: 2024-09-27
Attending: INTERNAL MEDICINE | Admitting: INTERNAL MEDICINE
Payer: MEDICARE

## 2024-09-24 DIAGNOSIS — G47.00 PERSISTENT INSOMNIA: ICD-10-CM

## 2024-09-24 DIAGNOSIS — F25.9 SCHIZOAFFECTIVE DISORDER, CHRONIC CONDITION WITH ACUTE EXACERBATION (H): ICD-10-CM

## 2024-09-24 DIAGNOSIS — N39.0 COMPLICATED UTI (URINARY TRACT INFECTION): ICD-10-CM

## 2024-09-24 DIAGNOSIS — F41.1 GAD (GENERALIZED ANXIETY DISORDER): ICD-10-CM

## 2024-09-24 DIAGNOSIS — M54.17 LUMBOSACRAL RADICULOPATHY AT L5: ICD-10-CM

## 2024-09-24 DIAGNOSIS — F41.9 ANXIETY: ICD-10-CM

## 2024-09-24 DIAGNOSIS — F39 MOOD DISORDER (H): ICD-10-CM

## 2024-09-24 DIAGNOSIS — I10 HYPERTENSION, UNSPECIFIED TYPE: ICD-10-CM

## 2024-09-24 DIAGNOSIS — F33.2 MAJOR DEPRESSIVE DISORDER, RECURRENT SEVERE WITHOUT PSYCHOTIC FEATURES (H): ICD-10-CM

## 2024-09-24 DIAGNOSIS — R10.9 FLANK PAIN: Primary | ICD-10-CM

## 2024-09-24 LAB
ALBUMIN UR-MCNC: 30 MG/DL
ANION GAP SERPL CALCULATED.3IONS-SCNC: 9 MMOL/L (ref 7–15)
APPEARANCE UR: CLEAR
BACTERIA #/AREA URNS HPF: ABNORMAL /HPF
BILIRUB UR QL STRIP: NEGATIVE
BUN SERPL-MCNC: 17.2 MG/DL (ref 6–20)
CALCIUM SERPL-MCNC: 9.8 MG/DL (ref 8.8–10.4)
CHLORIDE SERPL-SCNC: 103 MMOL/L (ref 98–107)
COLOR UR AUTO: ABNORMAL
CREAT SERPL-MCNC: 0.72 MG/DL (ref 0.51–1.17)
EGFRCR SERPLBLD CKD-EPI 2021: >90 ML/MIN/1.73M2
ERYTHROCYTE [DISTWIDTH] IN BLOOD BY AUTOMATED COUNT: 13.9 % (ref 10–15)
GLUCOSE BLDC GLUCOMTR-MCNC: 133 MG/DL (ref 70–99)
GLUCOSE BLDC GLUCOMTR-MCNC: 156 MG/DL (ref 70–99)
GLUCOSE BLDC GLUCOMTR-MCNC: 158 MG/DL (ref 70–99)
GLUCOSE BLDC GLUCOMTR-MCNC: 161 MG/DL (ref 70–99)
GLUCOSE BLDC GLUCOMTR-MCNC: 162 MG/DL (ref 70–99)
GLUCOSE SERPL-MCNC: 142 MG/DL (ref 70–99)
GLUCOSE UR STRIP-MCNC: >=1000 MG/DL
HCO3 SERPL-SCNC: 24 MMOL/L (ref 22–29)
HCT VFR BLD AUTO: 44.6 % (ref 35–53)
HGB BLD-MCNC: 15.1 G/DL (ref 11.7–17.7)
HGB UR QL STRIP: ABNORMAL
HYALINE CASTS: 1 /LPF
KETONES UR STRIP-MCNC: NEGATIVE MG/DL
LEUKOCYTE ESTERASE UR QL STRIP: ABNORMAL
MAGNESIUM SERPL-MCNC: 2.1 MG/DL (ref 1.7–2.3)
MCH RBC QN AUTO: 28.7 PG (ref 26.5–33)
MCHC RBC AUTO-ENTMCNC: 33.9 G/DL (ref 31.5–36.5)
MCV RBC AUTO: 85 FL (ref 78–100)
MUCOUS THREADS #/AREA URNS LPF: PRESENT /LPF
NITRATE UR QL: NEGATIVE
PH UR STRIP: 5.5 [PH] (ref 5–7)
PLATELET # BLD AUTO: 287 10E3/UL (ref 150–450)
POTASSIUM SERPL-SCNC: 4.1 MMOL/L (ref 3.4–5.3)
RBC # BLD AUTO: 5.27 10E6/UL (ref 3.8–5.9)
RBC URINE: 76 /HPF
SODIUM SERPL-SCNC: 136 MMOL/L (ref 135–145)
SP GR UR STRIP: 1.03 (ref 1–1.03)
UROBILINOGEN UR STRIP-MCNC: NORMAL MG/DL
WBC # BLD AUTO: 10.1 10E3/UL (ref 4–11)
WBC URINE: 13 /HPF

## 2024-09-24 PROCEDURE — 250N000013 HC RX MED GY IP 250 OP 250 PS 637: Performed by: PEDIATRICS

## 2024-09-24 PROCEDURE — 99233 SBSQ HOSP IP/OBS HIGH 50: CPT | Performed by: PEDIATRICS

## 2024-09-24 PROCEDURE — 85027 COMPLETE CBC AUTOMATED: CPT | Performed by: NURSE PRACTITIONER

## 2024-09-24 PROCEDURE — 120N000002 HC R&B MED SURG/OB UMMC

## 2024-09-24 PROCEDURE — 87186 SC STD MICRODIL/AGAR DIL: CPT | Performed by: PEDIATRICS

## 2024-09-24 PROCEDURE — 81001 URINALYSIS AUTO W/SCOPE: CPT | Performed by: PEDIATRICS

## 2024-09-24 PROCEDURE — A9270 NON-COVERED ITEM OR SERVICE: HCPCS | Performed by: NURSE PRACTITIONER

## 2024-09-24 PROCEDURE — 250N000013 HC RX MED GY IP 250 OP 250 PS 637: Performed by: NURSE PRACTITIONER

## 2024-09-24 PROCEDURE — 250N000011 HC RX IP 250 OP 636: Performed by: NURSE PRACTITIONER

## 2024-09-24 PROCEDURE — 83735 ASSAY OF MAGNESIUM: CPT | Performed by: PEDIATRICS

## 2024-09-24 PROCEDURE — 36415 COLL VENOUS BLD VENIPUNCTURE: CPT | Performed by: NURSE PRACTITIONER

## 2024-09-24 PROCEDURE — 80048 BASIC METABOLIC PNL TOTAL CA: CPT | Performed by: NURSE PRACTITIONER

## 2024-09-24 PROCEDURE — 99222 1ST HOSP IP/OBS MODERATE 55: CPT

## 2024-09-24 PROCEDURE — 250N000012 HC RX MED GY IP 250 OP 636 PS 637: Performed by: PEDIATRICS

## 2024-09-24 RX ORDER — PALIPERIDONE 3 MG/1
3 TABLET, EXTENDED RELEASE ORAL DAILY
Status: DISCONTINUED | OUTPATIENT
Start: 2024-09-24 | End: 2024-09-24

## 2024-09-24 RX ORDER — DEXTROSE MONOHYDRATE 25 G/50ML
25-50 INJECTION, SOLUTION INTRAVENOUS
Status: DISCONTINUED | OUTPATIENT
Start: 2024-09-24 | End: 2024-09-27 | Stop reason: HOSPADM

## 2024-09-24 RX ORDER — AMOXICILLIN 250 MG
1 CAPSULE ORAL 2 TIMES DAILY PRN
Status: DISCONTINUED | OUTPATIENT
Start: 2024-09-24 | End: 2024-09-27 | Stop reason: HOSPADM

## 2024-09-24 RX ORDER — NALOXONE HYDROCHLORIDE 0.4 MG/ML
0.2 INJECTION, SOLUTION INTRAMUSCULAR; INTRAVENOUS; SUBCUTANEOUS
Status: DISCONTINUED | OUTPATIENT
Start: 2024-09-24 | End: 2024-09-27 | Stop reason: HOSPADM

## 2024-09-24 RX ORDER — TRAZODONE HYDROCHLORIDE 50 MG/1
50 TABLET, FILM COATED ORAL
Status: DISCONTINUED | OUTPATIENT
Start: 2024-09-24 | End: 2024-09-27 | Stop reason: HOSPADM

## 2024-09-24 RX ORDER — AMLODIPINE BESYLATE 10 MG/1
10 TABLET ORAL DAILY
Status: DISCONTINUED | OUTPATIENT
Start: 2024-09-24 | End: 2024-09-26

## 2024-09-24 RX ORDER — GABAPENTIN 600 MG/1
1200 TABLET ORAL 2 TIMES DAILY
Status: DISCONTINUED | OUTPATIENT
Start: 2024-09-24 | End: 2024-09-27 | Stop reason: HOSPADM

## 2024-09-24 RX ORDER — POLYETHYLENE GLYCOL 3350 17 G/17G
17 POWDER, FOR SOLUTION ORAL DAILY
Status: DISCONTINUED | OUTPATIENT
Start: 2024-09-24 | End: 2024-09-27 | Stop reason: HOSPADM

## 2024-09-24 RX ORDER — CYCLOBENZAPRINE HCL 5 MG
10 TABLET ORAL EVERY 8 HOURS PRN
Status: DISCONTINUED | OUTPATIENT
Start: 2024-09-24 | End: 2024-09-27 | Stop reason: HOSPADM

## 2024-09-24 RX ORDER — CLONAZEPAM 0.5 MG/1
0.5 TABLET ORAL 2 TIMES DAILY PRN
Status: DISCONTINUED | OUTPATIENT
Start: 2024-09-24 | End: 2024-09-27 | Stop reason: HOSPADM

## 2024-09-24 RX ORDER — GABAPENTIN 600 MG/1
600 TABLET ORAL
Status: DISCONTINUED | OUTPATIENT
Start: 2024-09-24 | End: 2024-09-24

## 2024-09-24 RX ORDER — OXYCODONE HYDROCHLORIDE 5 MG/1
5 TABLET ORAL EVERY 4 HOURS PRN
Status: DISCONTINUED | OUTPATIENT
Start: 2024-09-24 | End: 2024-09-24

## 2024-09-24 RX ORDER — GABAPENTIN 600 MG/1
600 TABLET ORAL EVERY 24 HOURS
Status: DISCONTINUED | OUTPATIENT
Start: 2024-09-24 | End: 2024-09-27 | Stop reason: HOSPADM

## 2024-09-24 RX ORDER — NICOTINE POLACRILEX 4 MG
15-30 LOZENGE BUCCAL
Status: DISCONTINUED | OUTPATIENT
Start: 2024-09-24 | End: 2024-09-27 | Stop reason: HOSPADM

## 2024-09-24 RX ORDER — PALIPERIDONE 1.5 MG/1
4.5 TABLET, EXTENDED RELEASE ORAL DAILY
Status: DISCONTINUED | OUTPATIENT
Start: 2024-09-25 | End: 2024-09-27 | Stop reason: HOSPADM

## 2024-09-24 RX ORDER — HYDROMORPHONE HYDROCHLORIDE 1 MG/ML
.3-.5 INJECTION, SOLUTION INTRAMUSCULAR; INTRAVENOUS; SUBCUTANEOUS
Status: DISCONTINUED | OUTPATIENT
Start: 2024-09-24 | End: 2024-09-27

## 2024-09-24 RX ORDER — TESTOSTERONE GEL, 1% 10 MG/G
100 GEL TRANSDERMAL DAILY
Status: DISCONTINUED | OUTPATIENT
Start: 2024-09-24 | End: 2024-09-27 | Stop reason: HOSPADM

## 2024-09-24 RX ORDER — LIDOCAINE 40 MG/G
CREAM TOPICAL
Status: DISCONTINUED | OUTPATIENT
Start: 2024-09-24 | End: 2024-09-27 | Stop reason: HOSPADM

## 2024-09-24 RX ORDER — AMOXICILLIN 250 MG
2 CAPSULE ORAL 2 TIMES DAILY PRN
Status: DISCONTINUED | OUTPATIENT
Start: 2024-09-24 | End: 2024-09-27 | Stop reason: HOSPADM

## 2024-09-24 RX ORDER — VALACYCLOVIR HYDROCHLORIDE 1 G/1
2000 TABLET, FILM COATED ORAL 2 TIMES DAILY PRN
Status: DISCONTINUED | OUTPATIENT
Start: 2024-09-24 | End: 2024-09-27 | Stop reason: HOSPADM

## 2024-09-24 RX ORDER — NALOXONE HYDROCHLORIDE 0.4 MG/ML
0.4 INJECTION, SOLUTION INTRAMUSCULAR; INTRAVENOUS; SUBCUTANEOUS
Status: DISCONTINUED | OUTPATIENT
Start: 2024-09-24 | End: 2024-09-27 | Stop reason: HOSPADM

## 2024-09-24 RX ORDER — ACETAMINOPHEN 325 MG/1
975 TABLET ORAL EVERY 6 HOURS
Status: DISCONTINUED | OUTPATIENT
Start: 2024-09-24 | End: 2024-09-27 | Stop reason: HOSPADM

## 2024-09-24 RX ORDER — ONDANSETRON 2 MG/ML
4 INJECTION INTRAMUSCULAR; INTRAVENOUS EVERY 6 HOURS PRN
Status: DISCONTINUED | OUTPATIENT
Start: 2024-09-24 | End: 2024-09-27 | Stop reason: HOSPADM

## 2024-09-24 RX ORDER — LITHIUM CARBONATE 450 MG
900 TABLET, EXTENDED RELEASE ORAL AT BEDTIME
Status: DISCONTINUED | OUTPATIENT
Start: 2024-09-24 | End: 2024-09-27 | Stop reason: HOSPADM

## 2024-09-24 RX ORDER — ROSUVASTATIN CALCIUM 20 MG/1
40 TABLET, COATED ORAL DAILY
Status: DISCONTINUED | OUTPATIENT
Start: 2024-09-24 | End: 2024-09-27 | Stop reason: HOSPADM

## 2024-09-24 RX ORDER — ONDANSETRON 4 MG/1
4 TABLET, ORALLY DISINTEGRATING ORAL EVERY 6 HOURS PRN
Status: DISCONTINUED | OUTPATIENT
Start: 2024-09-24 | End: 2024-09-27 | Stop reason: HOSPADM

## 2024-09-24 RX ORDER — HYDROXYZINE HYDROCHLORIDE 25 MG/1
25 TABLET, FILM COATED ORAL EVERY 6 HOURS PRN
Status: DISCONTINUED | OUTPATIENT
Start: 2024-09-24 | End: 2024-09-27 | Stop reason: HOSPADM

## 2024-09-24 RX ORDER — OXYCODONE HYDROCHLORIDE 5 MG/1
5-10 TABLET ORAL EVERY 4 HOURS PRN
Status: DISCONTINUED | OUTPATIENT
Start: 2024-09-24 | End: 2024-09-27 | Stop reason: HOSPADM

## 2024-09-24 RX ORDER — LIDOCAINE 4 G/G
1-3 PATCH TOPICAL
Status: DISCONTINUED | OUTPATIENT
Start: 2024-09-24 | End: 2024-09-24

## 2024-09-24 RX ORDER — PAROXETINE 10 MG/1
20 TABLET, FILM COATED ORAL DAILY
Status: DISCONTINUED | OUTPATIENT
Start: 2024-09-24 | End: 2024-09-27 | Stop reason: HOSPADM

## 2024-09-24 RX ORDER — CALCIUM CARBONATE 500 MG/1
1000 TABLET, CHEWABLE ORAL 4 TIMES DAILY PRN
Status: DISCONTINUED | OUTPATIENT
Start: 2024-09-24 | End: 2024-09-27 | Stop reason: HOSPADM

## 2024-09-24 RX ORDER — PHENAZOPYRIDINE HYDROCHLORIDE 100 MG/1
100 TABLET, FILM COATED ORAL
Status: DISCONTINUED | OUTPATIENT
Start: 2024-09-24 | End: 2024-09-26

## 2024-09-24 RX ORDER — ARIPIPRAZOLE 10 MG/1
10 TABLET ORAL EVERY MORNING
Status: DISCONTINUED | OUTPATIENT
Start: 2024-09-24 | End: 2024-09-27 | Stop reason: HOSPADM

## 2024-09-24 RX ADMIN — HYDROMORPHONE HYDROCHLORIDE 0.5 MG: 1 INJECTION, SOLUTION INTRAMUSCULAR; INTRAVENOUS; SUBCUTANEOUS at 12:24

## 2024-09-24 RX ADMIN — PHENAZOPYRIDINE 100 MG: 100 TABLET ORAL at 17:46

## 2024-09-24 RX ADMIN — NICOTINE POLACRILEX 4 MG: 2 GUM, CHEWING BUCCAL at 22:47

## 2024-09-24 RX ADMIN — HYDROMORPHONE HYDROCHLORIDE 0.5 MG: 1 INJECTION, SOLUTION INTRAMUSCULAR; INTRAVENOUS; SUBCUTANEOUS at 18:04

## 2024-09-24 RX ADMIN — ACETAMINOPHEN 975 MG: 325 TABLET ORAL at 14:58

## 2024-09-24 RX ADMIN — AMLODIPINE BESYLATE 10 MG: 10 TABLET ORAL at 08:26

## 2024-09-24 RX ADMIN — GABAPENTIN 1200 MG: 600 TABLET, FILM COATED ORAL at 08:26

## 2024-09-24 RX ADMIN — NICOTINE POLACRILEX 4 MG: 2 GUM, CHEWING BUCCAL at 21:07

## 2024-09-24 RX ADMIN — PALIPERIDONE 3 MG: 3 TABLET, EXTENDED RELEASE ORAL at 08:26

## 2024-09-24 RX ADMIN — HYDROMORPHONE HYDROCHLORIDE 0.5 MG: 1 INJECTION, SOLUTION INTRAMUSCULAR; INTRAVENOUS; SUBCUTANEOUS at 01:33

## 2024-09-24 RX ADMIN — GABAPENTIN 1200 MG: 600 TABLET, FILM COATED ORAL at 20:18

## 2024-09-24 RX ADMIN — HYDROMORPHONE HYDROCHLORIDE 0.5 MG: 1 INJECTION, SOLUTION INTRAMUSCULAR; INTRAVENOUS; SUBCUTANEOUS at 08:29

## 2024-09-24 RX ADMIN — TESTOSTERONE 100 MG OF TESTOSTERONE: 50 GEL TOPICAL at 10:25

## 2024-09-24 RX ADMIN — DICLOFENAC SODIUM TOPICAL GEL, 1% 4 G: 10 GEL TOPICAL at 22:48

## 2024-09-24 RX ADMIN — ACETAMINOPHEN 975 MG: 325 TABLET ORAL at 20:19

## 2024-09-24 RX ADMIN — PAROXETINE HYDROCHLORIDE 20 MG: 10 TABLET, FILM COATED ORAL at 08:27

## 2024-09-24 RX ADMIN — OXYCODONE HYDROCHLORIDE 10 MG: 5 TABLET ORAL at 22:46

## 2024-09-24 RX ADMIN — EMPAGLIFLOZIN 10 MG: 10 TABLET, FILM COATED ORAL at 08:26

## 2024-09-24 RX ADMIN — INSULIN GLARGINE 5 UNITS: 100 INJECTION, SOLUTION SUBCUTANEOUS at 16:18

## 2024-09-24 RX ADMIN — QUETIAPINE 250 MG: 100 TABLET ORAL at 22:47

## 2024-09-24 RX ADMIN — NICOTINE POLACRILEX 4 MG: 2 GUM, CHEWING BUCCAL at 16:18

## 2024-09-24 RX ADMIN — HYDROMORPHONE HYDROCHLORIDE 0.5 MG: 1 INJECTION, SOLUTION INTRAMUSCULAR; INTRAVENOUS; SUBCUTANEOUS at 15:00

## 2024-09-24 RX ADMIN — CLONAZEPAM 0.5 MG: 0.5 TABLET ORAL at 10:25

## 2024-09-24 RX ADMIN — NICOTINE POLACRILEX 4 MG: 2 GUM, CHEWING BUCCAL at 18:04

## 2024-09-24 RX ADMIN — ROSUVASTATIN 40 MG: 20 TABLET, FILM COATED ORAL at 08:26

## 2024-09-24 RX ADMIN — GABAPENTIN 600 MG: 600 TABLET, FILM COATED ORAL at 14:57

## 2024-09-24 RX ADMIN — OXYCODONE HYDROCHLORIDE 10 MG: 5 TABLET ORAL at 16:18

## 2024-09-24 RX ADMIN — HYDROMORPHONE HYDROCHLORIDE 0.5 MG: 1 INJECTION, SOLUTION INTRAMUSCULAR; INTRAVENOUS; SUBCUTANEOUS at 21:06

## 2024-09-24 RX ADMIN — ARIPIPRAZOLE 10 MG: 10 TABLET ORAL at 10:25

## 2024-09-24 RX ADMIN — POLYETHYLENE GLYCOL 3350 17 G: 17 POWDER, FOR SOLUTION ORAL at 08:24

## 2024-09-24 RX ADMIN — LITHIUM CARBONATE 900 MG: 450 TABLET, EXTENDED RELEASE ORAL at 22:48

## 2024-09-24 ASSESSMENT — ACTIVITIES OF DAILY LIVING (ADL)
ADLS_ACUITY_SCORE: 39
DEPENDENT_IADLS:: CLEANING;COOKING;LAUNDRY;MEAL PREPARATION
ADLS_ACUITY_SCORE: 39

## 2024-09-24 NOTE — PROGRESS NOTES
Patient has been sleeping since 1830. Got an update about 30 minutes ago from 8 medical. Waiting for bed to cleaned before patient can be sent over.

## 2024-09-24 NOTE — PROGRESS NOTES
Shift 3596-1118:34 year transfer from Psych Pt has a history of ADHD,Bipolar 1 disorder and Self-injurious behavior  Has a history of Type 2 DM  Summary:Pt has a 1:1 Has continued thoughts of harming himself  VS:       Pt A/O X 4. Afebrile. VSS. Lungs- Clear bilaterally     Output:       Bowels- + in all four quadrants. Voids spontaneously without   difficulty in the Styles.   Pt on 9/18/24 began having difficulty urinating   Activity:       Pt up independently with cares with 1:1 at bedside.     Skin:   Repaired laceration on the Left Arm .     Pain:       Has pain in the bladder and given Dil\auid  IV Prn Pt asked for bladder spasms medication,hospitalist notified  Had no response.      CMS:       CMS and Neuro's are intact. Denies numbness and tingling in all extremities.      Dressing:       Bacitracin to the Left Arm.      Diet:       Pt is on a Regular diet  this shift.       LDA:       PIV is patent in the Left hand and C/D/I.      Equipment:        PCD's on BLE's. Bilateral heels are elevated off the bed.     Plan:       Pt is able to make needs known and the call light is within the pt's reach. Continue to monitor.       Additional Info:        .

## 2024-09-24 NOTE — PLAN OF CARE
"  Problem: Adult Inpatient Plan of Care  Goal: Plan of Care Review  Description: The Plan of Care Review/Shift note should be completed every shift.  The Outcome Evaluation is a brief statement about your assessment that the patient is improving, declining, or no change.  This information will be displayed automatically on your shift  note.  Outcome: Progressing  Flowsheets (Taken 9/24/2024 1412)  Outcome Evaluation: Patient to return home when able.  Plan of Care Reviewed With: patient  Overall Patient Progress: no change     Problem: Adult Inpatient Plan of Care  Goal: Plan of Care Review  Description: The Plan of Care Review/Shift note should be completed every shift.  The Outcome Evaluation is a brief statement about your assessment that the patient is improving, declining, or no change.  This information will be displayed automatically on your shift  note.  Outcome: Progressing  Flowsheets (Taken 9/24/2024 1412)  Outcome Evaluation: Patient to return home when able.  Plan of Care Reviewed With: patient  Overall Patient Progress: no change  Goal: Patient-Specific Goal (Individualized)  Description: You can add care plan individualizations to a care plan. Examples of Individualization might be:  \"Parent requests to be called daily at 9am for status\", \"I have a hard time hearing out of my right ear\", or \"Do not touch me to wake me up as it startles  me\".  Outcome: Progressing  Goal: Absence of Hospital-Acquired Illness or Injury  Outcome: Progressing  Goal: Optimal Comfort and Wellbeing  Outcome: Progressing  Goal: Readiness for Transition of Care  Outcome: Progressing     Problem: Suicide Risk  Goal: Absence of Self-Harm  Outcome: Progressing     "

## 2024-09-24 NOTE — CONSULTS
Care Management Initial Consult    General Information  Assessment completed with: Patient,    Type of CM/SW Visit: Initial Assessment    Primary Care Provider verified and updated as needed: Yes   Readmission within the last 30 days: no previous admission in last 30 days      Reason for Consult: discharge planning  Advance Care Planning: Advance Care Planning Reviewed: no concerns identified        Communication Assessment  Patient's communication style: spoken language (English or Bilingual)        Cognitive  Cognitive/Neuro/Behavioral: .WDL except  Level of Consciousness: alert  Arousal Level: opens eyes spontaneously  Orientation: oriented x 4  Mood/Behavior: anxious, flat affect  Best Language: 0 - No aphasia  Speech: spontaneous, clear    Living Environment:   People in home: Facility Resident    Current living Arrangements: group home     Able to return to prior arrangements: yes     Family/Social Support:  Care provided by: other (see comments) (PCA)  Provides care for: no one  Marital Status: Single  Support system:            Description of Support System: Supportive, Involved    Support Assessment: Adequate family and caregiver support    Current Resources:   Patient receiving home care services: No     Community Resources: County Worker, County Programs, , [facility]  Equipment currently used at home: none  Supplies currently used at home: None    Employment/Financial:  Employment Status: disabled        Financial Concerns: none   Referral to Financial Worker: No     Does the patient's insurance plan have a 3 day qualifying hospital stay waiver?  No    Lifestyle & Psychosocial Needs:  Social Determinants of Health     Food Insecurity: Low Risk  (9/16/2024)    Food Insecurity     Within the past 12 months, did you worry that your food would run out before you got money to buy more?: No     Within the past 12 months, did the food you bought just not last and you didn t have money to get more?: No    Depression: Not at risk (8/7/2024)    PHQ-2     PHQ-2 Score: 0   Housing Stability: High Risk (9/16/2024)    Housing Stability     Do you have housing? : Yes     Are you worried about losing your housing?: Yes   Tobacco Use: Medium Risk (9/3/2024)    Patient History     Smoking Tobacco Use: Former     Smokeless Tobacco Use: Former     Passive Exposure: Never   Financial Resource Strain: Low Risk  (9/16/2024)    Financial Resource Strain     Within the past 12 months, have you or your family members you live with been unable to get utilities (heat, electricity) when it was really needed?: No   Alcohol Use: Not At Risk (9/16/2024)    AUDIT-C     Frequency of Alcohol Consumption: Never     Average Number of Drinks: Patient does not drink     Frequency of Binge Drinking: Never   Transportation Needs: Low Risk  (9/16/2024)    Transportation Needs     Within the past 12 months, has lack of transportation kept you from medical appointments, getting your medicines, non-medical meetings or appointments, work, or from getting things that you need?: No   Physical Activity: Unknown (3/13/2024)    Exercise Vital Sign     Days of Exercise per Week: 0 days     Minutes of Exercise per Session: Not on file   Interpersonal Safety: Low Risk  (9/16/2024)    Interpersonal Safety     Do you feel physically and emotionally safe where you currently live?: Yes     Within the past 12 months, have you been hit, slapped, kicked or otherwise physically hurt by someone?: No     Within the past 12 months, have you been humiliated or emotionally abused in other ways by your partner or ex-partner?: No   Stress: No Stress Concern Present (3/13/2024)    [place] [employer] - Occupational Stress Questionnaire     Feeling of Stress : Not at all   Social Connections: Unknown (3/13/2024)    Social Connection and Isolation Panel [NHANES]     Frequency of Communication with Friends and Family: Not on file     Frequency of Social  Gatherings with Friends and Family: Three times a week     Attends Confucianist Services: Not on file     Active Member of Clubs or Organizations: Not on file     Attends Club or Organization Meetings: Not on file     Marital Status: Not on file   Health Literacy: Not on file     Functional Status:  Prior to admission patient needed assistance:   Dependent ADLs:: Independent  Dependent IADLs:: Cleaning, Cooking, Laundry, Meal Preparation  Assesssment of Functional Status: At functional baseline    Mental Health Status:  Mental Health Status: Current Concerns, Psychiatrist, Therapy, Mental Health Case Management      MI Commitment Through [facility]    Chemical Dependency Status:  Chemical Dependency Status: No Current Concerns           Values/Beliefs:  Spiritual, Cultural Beliefs, Confucianist Practices, Values that affect care: no             Discussed  Partnership in Safe Discharge Planning  document with patient/family: No    Additional Information:  Per H&P, patient is a 34 year old female to 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 adult with past medical history significant for TBI, type 2 DM, urinary retention, neurogenic bladder, GERD, ADHD, bipolar 1 disorder, polysubstance use disorder, borderline personality disorder, and schizoaffective disorder initially admitted to station 30 on 9/13/24 for suicidal ideation. He developed worsening flank pain on 9/22 along with reported tea colored urine. He is transferred to internal medicine service for management of flank pain refractory to oral medications.      Writer completed initial assessment due to elevated risk score. Writer introduced self, role and duties to patient. Patient reports that he resides in an independent [facility] Apartment. Patient reported that he doesn't use any equipment or medical supplies. He confirmed his address, contacts, Primary Care Physician, insurance and phone number. Patient does not have a Healthcare Directive on file. He  reports that he has completed one and writer asked him to provide copy.     He denies any financial concerns. Patient is disabled and receives social security disability benefits. He denies any chemical dependency concerns. Patient has diagnoses of Bipolar 1 Disorder, Borderline Personality Disorder, Schizoaffective Disorder and history of suicidal ideation. He also has chemical dependency concerns. Below are patient's services which are following him in the community. Patient is currently under a [facility] commitment. Patient's goal is to return to home when able.     Contacts/Services    Psychiatrist/Medication Management  Regine Last CNP, APRN   [facility]  Phone: [phone]  Fax: [phone]       Jie David  Mental Health Resources   Phone: [phone]  [email or web address]   [employer]ear DYER     Integrated Community Support  Home  Group Home Director   [facility]   [place]  Phone: [phone]     ABIMAEL Shaffer   [employer]   Phone: [phone]      CADI Worker  Lydia Britt   [facility] Services  Phone: [phone]     Commitment Information  File No: [id number]  County: [place]  Type: MI  Start Date: 2/9/23  Provisional discharge: 8/23/23  Provisional discharge revoked: 10/31/23    Next Steps:     Coordinate return to home when able. Patient will need provisional discharge at discharge. Will need to follow up with  at discharge.     JULISA Cotton, MSW  6th Floor Medical Surgical Unit  Phone [phone]      Message me securely in [place]

## 2024-09-24 NOTE — PROGRESS NOTES
[facility]    Medicine Progress Note - Hospitalist Service, GOLD TEAM 22    Date of Admission:  9/24/2024    Assessment & Plan      Prakash Prasad is a 34 year old female to 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 adult with past medical history significant for TBI, type 2 DM, urinary retention, neurogenic bladder, GERD, ADHD, bipolar 1 disorder, polysubstance use disorder, borderline personality disorder, and schizoaffective disorder initially admitted to station 30 on 9/13/24 for suicidal ideation.  He developed worsening flank pain on 9/22 along with reported tea colored urine.  He is transferred to internal medicine service for management of flank pain refractory to oral medications.      Bilateral flank pain due to nephrolithiasis, non obstructive  - oxycodone 5-10 mg q4h prn moderate to severe pain, scheduled tylenol, topical voltaren, IV Dilaudid 0.3-0.5mg Q3H PRN breakthrough  - Lidocaine patches discontinued, unhelpful  - Zofran 4-8mg PO or IV Q6H PRN  - urine clearing up per patient report, no urinalysis since 9/21, will add routine UA on today to see if RBC in urine    Bladder distention due to neurogenic bladder c/b cauda equina syndrome in setting of SCI (2016)  Has had issues with urinary retention intermittently since accident in 2016, last seen by Urology in 2019 and discussed CIC vs suprapubic catheter placement.    - Continue tamsulosin 0.4mg daily   - added pyridium for bladder spasm today 9/24  - Monitor I/Os  - Continue ferreira with routine cares   - Urology follow up outpatient for urodynamic studies      - abilify, paxil, and clonazepam can all cause urinary retention    Constipation due to neurogenic bowel  # ?Hematochezia vs melena vs hemorrhoids   - Bowel regimen with scheduled miralax and senna   - Continue simethicone PRN   - Check magnesium level     Suicidal ideation in context of Bipolar 1 disorder with depression, ADHD, schizoaffective; KIERRA in remission  Admitted  for anxiety, depression, and SI with SIB.  - Psychiatry consult placed for ongoing management  - Continue Abilify 10mg every morning   - Continue Paxil 20mg daily   - Continue Invega 3mg daily --> increased to 4.5 mg daily on 9/24  - Continue Gabapentin 1200mg BID   - Continue Lithium 900mg   - Continue Seroquel 250mg HS   - Continue SIO for hx self injurious behavior     HTN   - Continue PTA amlodipine     HLD   - Continue PTA statin     Type 2 DM   Well controlled. Hb A1c 6.7 8/7/24. Current regimen: Jardiance and Rybelsus (semaglutide). Hx of being on long acting insulin.  He does not have means to get his Rybelsus brought into the hospital, agreeable to long-acting insulin for now.  - BG checks 3 times daily before meals  - Continue PTA Jardiance  - Substitute Lantus 5 mg in place of PTA Rybelsus  - Hypoglycemia protocol     Diet: Regular Diet Adult    DVT Prophylaxis: ambulate   Styles Catheter: Not present  Lines: None     Cardiac Monitoring: None  Code Status: Full Code      Disposition Plan     Medically Ready for Discharge: Anticipated in 2-4 Days    Ata Hodges DO  Hospitalist Service, GOLD TEAM 22  [facility]  Securely message with [employer] (more info)  Text page via [facility] Paging/Directory   See signed in provider for up to date coverage information  ______________________________________________________________________    Interval History   Patient still complaining of severe pain and also having lower abdominal cramping with movement that improves with rest. Feels comparable to prior episodes of kidney stones. Urine was dark yesterday with sediment, has lightened up quite a bit today. Appetite is good, able to be up and walking around. Says that he doesn't take nsaids because was told it would interact with his lithium.    Physical Exam   Vital Signs: Temp: 98.1  F (36.7  C) Temp src: Oral BP: (!) 148/97 Pulse: 106   Resp: 18 SpO2: 97 % O2 Device: None  (Room air)    Weight: 230 lbs 9.62 oz    Awake, alert, observed walking halls and then sitting up in chair in room  RR and WOB normal  Mild tenderness bilateral costophrenic angles  Speech is clear and coherent  No LE swelling    MDM: high  See problem list above  Reviewed psych consult note, admission H&P, metabolic panel, cbc, poc glucose checks (6), discussed meds with pharmacy  high risk due to need for IV pain medicine

## 2024-09-24 NOTE — MEDICATION SCRIBE - ADMISSION MEDICATION HISTORY
Please see Admission Medication History completed on giovanni Andrade, under previous encounters at [facility] ED for information regarding prior to admission medications. Please see the discharge summary from [facility] on 9/23/24 by Dr Kruger for changes made to the PTA medications during the hospital stay. Please see eMAR for documentation of last med dose dates/times.

## 2024-09-24 NOTE — CONSULTS
Initial Psychiatric Consult   Consult date: September 24, 2024         Reason for Consult, requesting source:    Transferred from inpatient psychiatry   Requesting source: Ata Hodges    Labs and imaging reviewed. Patient seen and evaluated by BROOKLYN Nesbitt CNP          HPI:   Prakash Prasad is a 34 year old female to 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 adult with past medical history significant for TBI, type 2 DM, urinary retention, neurogenic bladder, GERD, ADHD, bipolar 1 disorder, polysubstance use disorder, borderline personality disorder, and schizoaffective disorder initially admitted to station 30 on 9/13/24 for suicidal ideation.  He developed worsening flank pain on 9/22 along with reported tea colored urine.  He is transferred to internal medicine service for management of flank pain refractory to oral medications.     He was started on Invega and paxil increased to target hallucinations and he states that it is helping. Today he has had no thoughts of suicide, death, or self harm and is hopeful that he can be discharged back to the community once medically stable. Didn't sleep well last night but attributed that to physical pain, not mental. Denies SI, HI, VH; does endorse negative voices not commanding in nature.         Past Psychiatric History:    ADHD, Suicide attempt(s), PTSD, Substance Use Disorder, Other, Personality Disorder, Bipolar Disorder (schizoaffective disorder - bipolar type). Pt has an established psychiatric provider, DBT therapist, [facility] , ILS worker and mental health .    Multiple hospitalizations, multiple suicide attempts. Has been on cymbalata, lamotrigine, fluoxetine, paxil, olanzapine, haldol, seroquel, trazodone, buspar, gabapentin, rexulti, geodon, adderall, xanax, lunesta, intuniv, hydroxyzine, propranolol, prolixin, abiliy, lithium, vyvanse, ativan, menatonin, concerta, risperdal, ambien, strattera, stelazine, prazosin, trifluoperazine.         Substance Use and History:   Occasional use of THC vape   History of polysubstance abuse including MDMA, opioids, THC, methamphetamine, THC. Treatment 7 years ago. Occasional alcohol use.       Past Medical History:   PAST MEDICAL HISTORY:   Past Medical History:   Diagnosis Date    ADHD (attention deficit hyperactivity disorder)     Bipolar 1 disorder     Borderline personality disorder     Cauda equina syndrome     Chronic low back pain     Depression     Diabetes mellitus, type 2 (H) 1/19/2023    GERD (gastroesophageal reflux disease)     h/o TBI (traumatic brain injury)     Hypertension, unspecified type 12/16/2021    Marginal corneal ulcer, left 07/17/2015    Nephrolithiasis     obesity     Polysubstance abuse - methamphetamine, hallucinagen, heroin, marijuana     currently in remission    PONV (postoperative nausea and vomiting)     PTSD (post-traumatic stress disorder)        PAST SURGICAL HISTORY:   Past Surgical History:   Procedure Laterality Date    BACK SURGERY  12/24/2016    BACK SURGERY - For Cauda Equina Syndrome  12/24/2016    COLONOSCOPY      COMBINED CYSTOSCOPY, INSERT STENT URETER(S) Left 8/30/2018    Procedure: COMBINED CYSTOSCOPY, INSERT STENT URETER(S);  Cystoscopy With Left Stent Placement;  Surgeon: Kiran Ulrich MD;  Location: [facility] OR    COMBINED CYSTOSCOPY, RETROGRADES, EXCHANGE STENT URETER(S) Left 10/14/2018    Procedure: COMBINED CYSTOSCOPY, RETROGRADES, EXCHANGE STENT URETER(S);  Cystoscopy and removal of left-sided stent.;  Surgeon: Stvien Olivo MD;  Location:  OR    COMBINED CYSTOSCOPY, RETROGRADES, URETEROSCOPY, INSERT STENT  1/6/2014    Procedure: COMBINED CYSTOSCOPY, RETROGRADES, URETEROSCOPY, INSERT STENT;  Cystyoscopy place left ureteral stent;  Surgeon: Jaun Kimble MD;  Location: [facility] OR    COMBINED CYSTOSCOPY, RETROGRADES, URETEROSCOPY, INSERT STENT Left 10/23/2018    Procedure: Video cystoscopy, left ureteroscopy with stone extraction;  Surgeon:  Bull Mast MD;  Location:  OR    CYSTOSCOPY, URETEROSCOPY, COMBINED Right 9/23/2015    Procedure: COMBINED CYSTOSCOPY, URETEROSCOPY;  Surgeon: ROME Jett MD;  Location: [facility] OR    ENT SURGERY      ESOPHAGOSCOPY, GASTROSCOPY, DUODENOSCOPY (EGD), COMBINED  4/8/2013    Procedure: COMBINED ESOPHAGOSCOPY, GASTROSCOPY, DUODENOSCOPY (EGD), BIOPSY SINGLE OR MULTIPLE;  Gastroscopy;  Surgeon: Peter Pickard MD;  Location: [facility] GI    ESOPHAGOSCOPY, GASTROSCOPY, DUODENOSCOPY (EGD), COMBINED Left 10/28/2014    Procedure: COMBINED ESOPHAGOSCOPY, GASTROSCOPY, DUODENOSCOPY (EGD), BIOPSY SINGLE OR MULTIPLE;  Surgeon: Narcisa Ramirez MD;  Location:  OR    ESOPHAGOSCOPY, GASTROSCOPY, DUODENOSCOPY (EGD), COMBINED N/A 12/24/2018    Procedure: COMBINED ESOPHAGOSCOPY, GASTROSCOPY, DUODENOSCOPY (EGD), BIOPSY SINGLE OR MULTIPLE;  Surgeon: Sonu Verduzco MD;  Location: [facility] GI    INJECT EPIDURAL TRANSFORAMINAL LUMBAR / SACRAL EA ADDITIONAL LEVEL Left 3/18/2021    Procedure: Left L5/S1 transforaminal injection for selective L5 nerve root block;  Surgeon: Eliza Pagan MD;  Location: [facility] OR    LAPAROSCOPIC CHOLECYSTECTOMY  11/20/2014    [facility], Dr. Ramirez    LASER HOLMIUM LITHOTRIPSY URETER(S), INSERT STENT, COMBINED  4/2/2014    Procedure: COMBINED CYSTOSCOPY, URETEROSCOPY, LASER HOLMIUM LITHOTRIPSY URETER(S), INSERT STENT;  Cystoscopy,Left Ureteral Stent Removal,Left Ureteroscopy with Laser Lithotripsy,Left Ureter Stent Placement;  Surgeon: ROME Jett MD;  Location: [facility] OR    Transurethral stone resection  03/11/2011             Family History:   FAMILY HISTORY:   Family History   Problem Relation Age of Onset    Hyperlipidemia Mother     Mental Illness Mother     Anxiety Disorder Mother     Anesthesia Reaction Mother         Vomiting/Nausea    Other Cancer Mother     Skin Cancer Mother     Gastrointestinal Disease Father         Crohn's Disease    Cancer Father         Liver Cancer    Other Cancer  Father         Liver    Obesity Father     Skin Cancer Father     No Known Problems Sister     Hypertension Brother     Other Cancer Brother         Esophagecial    Diabetes Brother     Obesity Brother     Hypertension Brother     Other Cancer Brother     Cancer Maternal Grandmother         lympoma    Diabetes Maternal Grandmother     Mental Illness Maternal Grandmother     Other Cancer Maternal Grandmother         Non Hodgkins Lymphoma    Diabetes Maternal Grandfather     Hypertension Maternal Grandfather     Prostate Cancer Maternal Grandfather     Hyperlipidemia Maternal Grandfather     Heart Disease Paternal Grandfather         heart disease    Other Cancer Paternal Half-Brother        Family Psychiatric History: denies        Social History:   SOCIAL HISTORY:   Social History     Tobacco Use    Smoking status: Former     Current packs/day: 0.00     Average packs/day: 0.5 packs/day for 11.1 years (5.6 ttl pk-yrs)     Types: Other, Cigarettes     Start date: 2011     Quit date: 2022     Years since quittin.0     Passive exposure: Never    Smokeless tobacco: Former     Types: Chew     Quit date: 2019    Tobacco comments:     Just nicotine gum currently. 23   Substance Use Topics    Alcohol use: Yes     Employment Status:  disabled  Source of Income:  disability  Financial Environmental Concerns:   (transition to independent housing from  one month ago)           Physical ROS:   The 10 point Review of Systems is negative other than noted in the HPI or here.           Medications:     Current Facility-Administered Medications   Medication Dose Route Frequency Provider Last Rate Last Admin    amLODIPine (NORVASC) tablet 10 mg  10 mg Oral Daily Sydney Subramanian CNP   10 mg at 24 0826    ARIPiprazole (ABILIFY) tablet 10 mg  10 mg Oral QAM Sydney Subramanian CNP   10 mg at 24 1025    empagliflozin (JARDIANCE) tablet 10 mg  10 mg Oral Daily Sydney Subramanian  Vinay, CNP   10 mg at 09/24/24 0826    gabapentin (NEURONTIN) tablet 1,200 mg  1,200 mg Oral BID Sydney Subramanian CNP   1,200 mg at 09/24/24 0826    gabapentin (NEURONTIN) tablet 600 mg  600 mg Oral Q24H Ata Hodges DO        Lidocaine (LIDOCARE) 4 % Patch 1-3 patch  1-3 patch Transdermal Q24H Sydney Subramanian CNP        lithium (ESKALITH CR/LITHOBID) CR tablet 900 mg  900 mg Oral At Bedtime Sydney Subramanian CNP        [START ON 9/25/2024] paliperidone (INVEGA) 24 hr tablet 4.5 mg  4.5 mg Oral Daily Shona Black APRN CNP        PARoxetine (PAXIL) tablet 20 mg  20 mg Oral Daily Sydney Subramanian CNP   20 mg at 09/24/24 0827    polyethylene glycol (MIRALAX) Packet 17 g  17 g Oral Daily Sydney Subramanian CNP   17 g at 09/24/24 0824    QUEtiapine (SEROquel) tablet 250 mg  250 mg Oral At Bedtime Sydney Subramanian CNP        rosuvastatin (CRESTOR) tablet 40 mg  40 mg Oral Daily Sydney Subramanian CNP   40 mg at 09/24/24 0826    sodium chloride (PF) 0.9% PF flush 3 mL  3 mL Intracatheter Q8H Sydney Subramanian CNP   3 mL at 09/24/24 0830    testosterone (ANDROGEL/TESTIM) 50 MG/5GM (1%) topical gel 100 mg of testosterone  100 mg of testosterone Transdermal Daily Sydney Subramanian CNP   100 mg of testosterone at 09/24/24 1025              Allergies:     Allergies   Allergen Reactions    Haldol [Haloperidol] Other (See Comments)     Makes patient very angry and anxious    Adhesive Tape Hives    Prednisone Other (See Comments) and Hives     Suicidal ideation    Droperidol Anxiety          Labs:     Recent Results (from the past 48 hour(s))   Glucose by meter    Collection Time: 09/22/24  5:31 PM   Result Value Ref Range    GLUCOSE BY METER POCT 192 (H) 70 - 99 mg/dL   Basic metabolic panel    Collection Time: 09/23/24 12:46 AM   Result Value Ref Range    Sodium 138 135 - 145 mmol/L    Potassium 4.3 3.4 - 5.3 mmol/L     Chloride 104 98 - 107 mmol/L    Carbon Dioxide (CO2) 24 22 - 29 mmol/L    Anion Gap 10 7 - 15 mmol/L    Urea Nitrogen 17.7 6.0 - 20.0 mg/dL    Creatinine 0.69 0.51 - 1.17 mg/dL    GFR Estimate >90 >60 mL/min/1.73m2    Calcium 9.7 8.8 - 10.4 mg/dL    Glucose 221 (H) 70 - 99 mg/dL   CBC with platelets and differential    Collection Time: 09/23/24 12:46 AM   Result Value Ref Range    WBC Count 10.0 4.0 - 11.0 10e3/uL    RBC Count 5.00 3.80 - 5.90 10e6/uL    Hemoglobin 14.4 11.7 - 17.7 g/dL    Hematocrit 42.1 35.0 - 53.0 %    MCV 84 78 - 100 fL    MCH 28.8 26.5 - 33.0 pg    MCHC 34.2 31.5 - 36.5 g/dL    RDW 13.8 10.0 - 15.0 %    Platelet Count 273 150 - 450 10e3/uL    % Neutrophils 51 %    % Lymphocytes 37 %    % Monocytes 6 %    % Eosinophils 5 %    % Basophils 1 %    % Immature Granulocytes 1 %    NRBCs per 100 WBC 0 <1 /100    Absolute Neutrophils 5.0 1.6 - 8.3 10e3/uL    Absolute Lymphocytes 3.7 0.8 - 5.3 10e3/uL    Absolute Monocytes 0.6 0.0 - 1.3 10e3/uL    Absolute Eosinophils 0.5 0.0 - 0.7 10e3/uL    Absolute Basophils 0.1 0.0 - 0.2 10e3/uL    Absolute Immature Granulocytes 0.1 <=0.4 10e3/uL    Absolute NRBCs 0.0 10e3/uL   Hepatic panel    Collection Time: 09/23/24 12:46 AM   Result Value Ref Range    Protein Total 7.6 6.4 - 8.3 g/dL    Albumin 4.5 3.5 - 5.2 g/dL    Bilirubin Total 0.2 <=1.2 mg/dL    Alkaline Phosphatase 80 40 - 150 U/L    AST 38 0 - 45 U/L    ALT 45 0 - 70 U/L    Bilirubin Direct <0.20 0.00 - 0.30 mg/dL   Glucose by meter    Collection Time: 09/23/24  8:24 AM   Result Value Ref Range    GLUCOSE BY METER POCT 149 (H) 70 - 99 mg/dL   Glucose by meter    Collection Time: 09/23/24  5:10 PM   Result Value Ref Range    GLUCOSE BY METER POCT 165 (H) 70 - 99 mg/dL   Glucose by meter    Collection Time: 09/24/24  2:23 AM   Result Value Ref Range    GLUCOSE BY METER POCT 161 (H) 70 - 99 mg/dL   Glucose by meter    Collection Time: 09/24/24  7:34 AM   Result Value Ref Range    GLUCOSE BY METER POCT 156  (H) 70 - 99 mg/dL   Basic metabolic panel    Collection Time: 09/24/24  7:51 AM   Result Value Ref Range    Sodium 136 135 - 145 mmol/L    Potassium 4.1 3.4 - 5.3 mmol/L    Chloride 103 98 - 107 mmol/L    Carbon Dioxide (CO2) 24 22 - 29 mmol/L    Anion Gap 9 7 - 15 mmol/L    Urea Nitrogen 17.2 6.0 - 20.0 mg/dL    Creatinine 0.72 0.51 - 1.17 mg/dL    GFR Estimate >90 >60 mL/min/1.73m2    Calcium 9.8 8.8 - 10.4 mg/dL    Glucose 142 (H) 70 - 99 mg/dL   CBC with platelets    Collection Time: 09/24/24  7:51 AM   Result Value Ref Range    WBC Count 10.1 4.0 - 11.0 10e3/uL    RBC Count 5.27 3.80 - 5.90 10e6/uL    Hemoglobin 15.1 11.7 - 17.7 g/dL    Hematocrit 44.6 35.0 - 53.0 %    MCV 85 78 - 100 fL    MCH 28.7 26.5 - 33.0 pg    MCHC 33.9 31.5 - 36.5 g/dL    RDW 13.9 10.0 - 15.0 %    Platelet Count 287 150 - 450 10e3/uL   Glucose by meter    Collection Time: 09/24/24 12:10 PM   Result Value Ref Range    GLUCOSE BY METER POCT 133 (H) 70 - 99 mg/dL          Physical and Psychiatric Examination:     BP (!) 148/97 (BP Location: Left arm)   Pulse 106   Temp 98.1  F (36.7  C) (Oral)   Resp 18   Wt 104.6 kg (230 lb 9.6 oz)   LMP  (LMP Unknown)   SpO2 97%   BMI 37.22 kg/m    Weight is 230 lbs 9.62 oz  Body mass index is 37.22 kg/m .    Physical Exam:  I have reviewed the physical exam as documented by by the medical team and agree with findings and assessment and have no additional findings to add at this time.    Mental Status Exam:    Appearance: awake, alert  Attitude:  cooperative  Eye Contact:  good  Mood:  better  Affect:  appropriate and in normal range and mood congruent  Speech:  clear, coherent  Language: Fluent in english   Psychomotor Behavior:  no evidence of tardive dyskinesia, dystonia, or tics  Thought Process:  logical, linear, and goal oriented  Associations:  no loose associations  Thought Content:  no evidence of suicidal ideation or homicidal ideation and no evidence of psychotic thought  Insight:   fair  Judgement:  fair  Oriented to:  time, person, and place  Attention Span and Concentration:  intact  Recent and Remote Memory:  intact  Fund of Knowledge: Appropriate   Gait and Station: baseline                DSM-5 Diagnosis:   Bipolar affective disorder, depressed vs Major depressive disorder, recurrent, severe with psychotic features;   Borderline personality disorder           Assessment:   Prakash is a 34 year old transgender male who transferred to medicine from inpatient psychiatry 9/23 for renal calculi. He reports that the addition of invega has been helpful, still bothered by negative voices he is having. Denies any safety concerns - self injurious urges or suicidal ideation. He requested an increase of the Invega and is hopeful to discharge to community from medicine once medically cleared, discussed need for re-evaluation once medically cleared and requirement to work with commitment , he verbalized understanding.           Summary of Recommendations:     Increased Invega to 4.5mg   Please continue 1:1  Patient is under commitment -- will need to work with commitment  Jie David at [phone] [email or web address]     I will follow -       SOPHIA Jorgensen-BC  Consult/Liaison Psychiatry   [facility]

## 2024-09-25 LAB
ANION GAP SERPL CALCULATED.3IONS-SCNC: 9 MMOL/L (ref 7–15)
BUN SERPL-MCNC: 18.2 MG/DL (ref 6–20)
CALCIUM SERPL-MCNC: 8.9 MG/DL (ref 8.8–10.4)
CHLORIDE SERPL-SCNC: 105 MMOL/L (ref 98–107)
CREAT SERPL-MCNC: 0.89 MG/DL (ref 0.51–1.17)
EGFRCR SERPLBLD CKD-EPI 2021: >90 ML/MIN/1.73M2
ERYTHROCYTE [DISTWIDTH] IN BLOOD BY AUTOMATED COUNT: 14 % (ref 10–15)
GLUCOSE BLDC GLUCOMTR-MCNC: 131 MG/DL (ref 70–99)
GLUCOSE BLDC GLUCOMTR-MCNC: 139 MG/DL (ref 70–99)
GLUCOSE BLDC GLUCOMTR-MCNC: 140 MG/DL (ref 70–99)
GLUCOSE BLDC GLUCOMTR-MCNC: 152 MG/DL (ref 70–99)
GLUCOSE SERPL-MCNC: 149 MG/DL (ref 70–99)
HCO3 SERPL-SCNC: 23 MMOL/L (ref 22–29)
HCT VFR BLD AUTO: 45.6 % (ref 35–53)
HGB BLD-MCNC: 15.5 G/DL (ref 11.7–17.7)
MCH RBC QN AUTO: 28.9 PG (ref 26.5–33)
MCHC RBC AUTO-ENTMCNC: 34 G/DL (ref 31.5–36.5)
MCV RBC AUTO: 85 FL (ref 78–100)
PLATELET # BLD AUTO: 264 10E3/UL (ref 150–450)
POTASSIUM SERPL-SCNC: 4 MMOL/L (ref 3.4–5.3)
RBC # BLD AUTO: 5.36 10E6/UL (ref 3.8–5.9)
SODIUM SERPL-SCNC: 137 MMOL/L (ref 135–145)
WBC # BLD AUTO: 9.9 10E3/UL (ref 4–11)

## 2024-09-25 PROCEDURE — 250N000013 HC RX MED GY IP 250 OP 250 PS 637: Performed by: PEDIATRICS

## 2024-09-25 PROCEDURE — 250N000013 HC RX MED GY IP 250 OP 250 PS 637: Performed by: NURSE PRACTITIONER

## 2024-09-25 PROCEDURE — 250N000011 HC RX IP 250 OP 636: Performed by: PEDIATRICS

## 2024-09-25 PROCEDURE — 36415 COLL VENOUS BLD VENIPUNCTURE: CPT | Performed by: NURSE PRACTITIONER

## 2024-09-25 PROCEDURE — 99232 SBSQ HOSP IP/OBS MODERATE 35: CPT

## 2024-09-25 PROCEDURE — 99233 SBSQ HOSP IP/OBS HIGH 50: CPT | Performed by: PEDIATRICS

## 2024-09-25 PROCEDURE — 250N000013 HC RX MED GY IP 250 OP 250 PS 637

## 2024-09-25 PROCEDURE — 120N000002 HC R&B MED SURG/OB UMMC

## 2024-09-25 PROCEDURE — 80048 BASIC METABOLIC PNL TOTAL CA: CPT | Performed by: NURSE PRACTITIONER

## 2024-09-25 PROCEDURE — 250N000011 HC RX IP 250 OP 636: Performed by: NURSE PRACTITIONER

## 2024-09-25 PROCEDURE — A9270 NON-COVERED ITEM OR SERVICE: HCPCS | Performed by: NURSE PRACTITIONER

## 2024-09-25 PROCEDURE — 85027 COMPLETE CBC AUTOMATED: CPT | Performed by: NURSE PRACTITIONER

## 2024-09-25 RX ORDER — CEFTRIAXONE 2 G/1
2 INJECTION, POWDER, FOR SOLUTION INTRAMUSCULAR; INTRAVENOUS EVERY 24 HOURS
Status: DISCONTINUED | OUTPATIENT
Start: 2024-09-25 | End: 2024-09-27

## 2024-09-25 RX ADMIN — PHENAZOPYRIDINE 100 MG: 100 TABLET ORAL at 08:12

## 2024-09-25 RX ADMIN — CLONAZEPAM 0.5 MG: 0.5 TABLET ORAL at 16:45

## 2024-09-25 RX ADMIN — PALIPERIDONE 4.5 MG: 1.5 TABLET, EXTENDED RELEASE ORAL at 08:13

## 2024-09-25 RX ADMIN — ARIPIPRAZOLE 10 MG: 10 TABLET ORAL at 08:13

## 2024-09-25 RX ADMIN — AMLODIPINE BESYLATE 10 MG: 10 TABLET ORAL at 08:15

## 2024-09-25 RX ADMIN — GABAPENTIN 600 MG: 600 TABLET, FILM COATED ORAL at 13:11

## 2024-09-25 RX ADMIN — ROSUVASTATIN 40 MG: 20 TABLET, FILM COATED ORAL at 08:13

## 2024-09-25 RX ADMIN — ACETAMINOPHEN 975 MG: 325 TABLET ORAL at 14:32

## 2024-09-25 RX ADMIN — INSULIN GLARGINE 5 UNITS: 100 INJECTION, SOLUTION SUBCUTANEOUS at 08:22

## 2024-09-25 RX ADMIN — ACETAMINOPHEN 975 MG: 325 TABLET ORAL at 08:13

## 2024-09-25 RX ADMIN — OXYCODONE HYDROCHLORIDE 10 MG: 5 TABLET ORAL at 23:05

## 2024-09-25 RX ADMIN — CEFTRIAXONE SODIUM 2 G: 2 INJECTION, POWDER, FOR SOLUTION INTRAMUSCULAR; INTRAVENOUS at 16:45

## 2024-09-25 RX ADMIN — HYDROMORPHONE HYDROCHLORIDE 0.5 MG: 1 INJECTION, SOLUTION INTRAMUSCULAR; INTRAVENOUS; SUBCUTANEOUS at 10:15

## 2024-09-25 RX ADMIN — QUETIAPINE 250 MG: 100 TABLET ORAL at 22:20

## 2024-09-25 RX ADMIN — PAROXETINE HYDROCHLORIDE 20 MG: 10 TABLET, FILM COATED ORAL at 08:14

## 2024-09-25 RX ADMIN — PHENAZOPYRIDINE 100 MG: 100 TABLET ORAL at 13:11

## 2024-09-25 RX ADMIN — PHENAZOPYRIDINE 100 MG: 100 TABLET ORAL at 18:45

## 2024-09-25 RX ADMIN — HYDROXYZINE HYDROCHLORIDE 25 MG: 25 TABLET, FILM COATED ORAL at 13:11

## 2024-09-25 RX ADMIN — GABAPENTIN 1200 MG: 600 TABLET, FILM COATED ORAL at 08:12

## 2024-09-25 RX ADMIN — NICOTINE POLACRILEX 4 MG: 2 GUM, CHEWING BUCCAL at 19:49

## 2024-09-25 RX ADMIN — NICOTINE POLACRILEX 4 MG: 2 GUM, CHEWING BUCCAL at 09:54

## 2024-09-25 RX ADMIN — OXYCODONE HYDROCHLORIDE 5 MG: 5 TABLET ORAL at 13:11

## 2024-09-25 RX ADMIN — POLYETHYLENE GLYCOL 3350 17 G: 17 POWDER, FOR SOLUTION ORAL at 08:14

## 2024-09-25 RX ADMIN — ONDANSETRON 4 MG: 2 INJECTION INTRAMUSCULAR; INTRAVENOUS at 14:32

## 2024-09-25 RX ADMIN — HYDROMORPHONE HYDROCHLORIDE 0.5 MG: 1 INJECTION, SOLUTION INTRAMUSCULAR; INTRAVENOUS; SUBCUTANEOUS at 22:25

## 2024-09-25 RX ADMIN — ACETAMINOPHEN 975 MG: 325 TABLET ORAL at 22:20

## 2024-09-25 RX ADMIN — HYDROMORPHONE HYDROCHLORIDE 0.5 MG: 1 INJECTION, SOLUTION INTRAMUSCULAR; INTRAVENOUS; SUBCUTANEOUS at 14:32

## 2024-09-25 RX ADMIN — HYDROMORPHONE HYDROCHLORIDE 0.5 MG: 1 INJECTION, SOLUTION INTRAMUSCULAR; INTRAVENOUS; SUBCUTANEOUS at 06:38

## 2024-09-25 RX ADMIN — TESTOSTERONE 100 MG OF TESTOSTERONE: 50 GEL TOPICAL at 08:14

## 2024-09-25 RX ADMIN — NICOTINE POLACRILEX 4 MG: 2 GUM, CHEWING BUCCAL at 13:12

## 2024-09-25 RX ADMIN — LITHIUM CARBONATE 900 MG: 450 TABLET, EXTENDED RELEASE ORAL at 22:20

## 2024-09-25 RX ADMIN — HYDROMORPHONE HYDROCHLORIDE 0.5 MG: 1 INJECTION, SOLUTION INTRAMUSCULAR; INTRAVENOUS; SUBCUTANEOUS at 18:45

## 2024-09-25 RX ADMIN — GABAPENTIN 1200 MG: 600 TABLET, FILM COATED ORAL at 19:49

## 2024-09-25 RX ADMIN — EMPAGLIFLOZIN 10 MG: 10 TABLET, FILM COATED ORAL at 08:14

## 2024-09-25 RX ADMIN — OXYCODONE HYDROCHLORIDE 10 MG: 5 TABLET ORAL at 08:15

## 2024-09-25 ASSESSMENT — ACTIVITIES OF DAILY LIVING (ADL)
ADLS_ACUITY_SCORE: 39
ADLS_ACUITY_SCORE: 40
ADLS_ACUITY_SCORE: 39
ADLS_ACUITY_SCORE: 40
ADLS_ACUITY_SCORE: 39
ADLS_ACUITY_SCORE: 40

## 2024-09-25 NOTE — PROGRESS NOTES
[facility]    Medicine Progress Note - Hospitalist Service, GOLD TEAM 22    Date of Admission:  9/24/2024    Assessment & Plan   Prakash Prasad is a 34 year old female to 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 adult with past medical history significant for TBI, type 2 DM, urinary retention, neurogenic bladder, GERD, ADHD, bipolar 1 disorder, polysubstance use disorder, borderline personality disorder, and schizoaffective disorder initially admitted to station 30 on 9/13/24 for suicidal ideation.  He developed worsening flank pain on 9/22 along with reported tea colored urine.  He is transferred to internal medicine service for management of flank pain refractory to oral medications.      Bilateral flank pain due to nephrolithiasis, non obstructive  - oxycodone 5-10 mg q4h prn moderate to severe pain, scheduled tylenol, topical voltaren, IV Dilaudid 0.3-0.5mg Q3H PRN breakthrough  - Lidocaine patches discontinued, unhelpful  - Zofran 4-8mg PO or IV Q6H PRN    ?UTI - suspect simple cystitis that could have been provoked by placement of Ferreira  - patient without systemic symptoms to indicate complicated UTI but continues to complain of bladder spasm and UA has quite a few wbc along with bacteria and leuk esterase; added on urine culture and will await result prior to starting antibiotics  - would need Ferreira exchange with initiation of antibiotics as well if infected    Bladder distention due to neurogenic bladder c/b cauda equina syndrome in setting of SCI (2016)  Has had issues with urinary retention intermittently since accident in 2016, last seen by Urology in 2019 and discussed CIC vs suprapubic catheter placement.    - Continue tamsulosin 0.4mg daily   - added pyridium for bladder spasm today 9/24  - Monitor I/Os  - Continue ferreira with routine cares   - Urology follow up outpatient for urodynamic studies and consideration of suprapubic cath or conduit, would not do inpatient unless  extenuating circumstances  - abilify, paxil, and clonazepam can all cause urinary retention    Constipation due to neurogenic bowel  # ?Hematochezia vs melena vs hemorrhoids   - Bowel regimen with scheduled miralax and senna   - Continue simethicone PRN   - Check magnesium level     Suicidal ideation in context of Bipolar 1 disorder with depression, ADHD, schizoaffective; KIERRA in remission  Admitted for anxiety, depression, and SI with SIB.  - Psychiatry consult placed for ongoing management  - Continue Abilify 10mg every morning   - Continue Paxil 20mg daily   - Continue Invega 3mg daily --> increased to 4.5 mg daily on 9/24  - Continue Gabapentin 1200mg BID   - Continue Lithium 900mg   - Continue Seroquel 250mg HS   - Continue SIO for hx self injurious behavior     HTN   - Continue PTA amlodipine     HLD   - Continue PTA statin     Type 2 DM   Well controlled. Hb A1c 6.7 8/7/24. Current regimen: Jardiance and Rybelsus (semaglutide). Hx of being on long acting insulin.  He does not have means to get his Rybelsus brought into the hospital, agreeable to long-acting insulin for now.  - BG checks 3 times daily before meals  - Continue PTA Jardiance  - Substitute Lantus 5 mg in place of PTA Rybelsus  - Hypoglycemia protocol     Diet: Regular Diet Adult    DVT Prophylaxis: Ambulate every shift  Styles Catheter: PRESENT, indication: Acute retention or obstruction, Acute retention or obstruction  Lines: None     Cardiac Monitoring: None  Code Status: Full Code      Disposition Plan     Medically Ready for Discharge: Anticipated in 2-4 Days    Ata Hodges DO  Hospitalist Service, GOLD TEAM 22  [facility]  Securely message with [email or web address] (more info)  Text page via [facility] Paging/Directory   See signed in provider for up to date coverage information  ______________________________________________________________________    Interval History   Pain marginally better, well controlled  with alternating oxy and hydromorphone. Patient now interested in suprapubic catheter - told him I would discuss with urology. Feels the pyridium may have helped bladder spasm pain a bit. Denies fever and chills. Denies history of recurrent UTI's, can recall maybe one historically.     Physical Exam   Vital Signs: Temp: 98.1  F (36.7  C) Temp src: Oral BP: 129/84 Pulse: 106   Resp: 18 SpO2: 96 % O2 Device: None (Room air)    Weight: 230 lbs 9.62 oz    Sitting up in a chair in no distress  Awake, alert  Speech is clear and coherent  RR and WOB normal  Urine in Styles collection basin appears straw colored without sediment or blood    MDM: high  See problem list above  Reviewed UA from yesterday, CBC this morning, POC glucoses last 24 hours (3), spoke with urology  high risk due to need for continued indwelling catheter and IV pain medicine

## 2024-09-25 NOTE — PROGRESS NOTES
Care Management Follow Up    Length of Stay (days): 1    Expected Discharge Date: 09/27/2024     Concerns to be Addressed: discharge planning     Patient plan of care discussed at interdisciplinary rounds: Yes    Anticipated Discharge Disposition: Home     [facility]  [facility] Director   Phone: [phone]     Anticipated Discharge Services:     Psychiatrist/Medication Management  Regine Last, CNP, APRN   [facility]  Phone: [phone]  Fax: [phone]     ARMNIURKA  Deena   [facility]   Phone: [phone]      CADI Worker  Lydia Britt   [facility]  Phone: [phone]     Anticipated Discharge DME: None    Patient/family educated on Medicare website which has current facility and service quality ratings: no  Education Provided on the Discharge Plan: Yes  Patient/Family in Agreement with the Plan:      Referrals Placed by CM/SW:  None at this time  Private pay costs discussed: Not applicable    Discussed  Partnership in Safe Discharge Planning  document with patient/family: No     Handoff Completed: Yes, [facility] PCP: Internal handoff referral completed    Additional Information:    Civil Commitment Information:  Type of Commitment (MI, CD, MICD): [facility]: [facility] File Number: [id number]  Date of Commitment: 6/9/23 (stay of commitment); 8/15/23 (full commitment); 2/9/2024 (continued commitment)  Date Commitment Ends: 12/14/2024  Commitment : Jie David (P: [phone]; E: [email or web address])  Dion or [place]? NONE.    Provisional Discharge revoked 9/18/2024. New PD needed at discharge.    ___________________________________________    Spoke with psych NP, patient likely to not need transfer back to [facility] once medically stable. Patient had just spoke with commitment  who reportedly is on board with this plan. SW to connect with  prior to discharge.    SW spoke with hospitalist.  Patient has new ferreira that will remain in until his outpatient urology follow-up. This is scheduled for 10/8/24 at 3pm.    SW called patient's commitment , Jie, and left  requesting call back to discuss discharge plan.    AUGIE called Domenica, Director at [facility], to discuss what supports they provide for patient and what they might need upon discharge. See above for contact information. SW left  requesting call back. Did not leave patient-identifying information as residency still needs to be confirmed.        Next Steps:     Connect with Mata to discuss discharge needs.    Proceed with discharge once medically stable.        ELI LundbergW, LSW  8 Med Surg   [facility]  Phone: [phone]  Vocera: Searchable by name or group: 8 Med Surg Vocera

## 2024-09-25 NOTE — PLAN OF CARE
VS: /84 (BP Location: Left arm)   Pulse 96   Temp 98.6  F (37  C) (Oral)   Resp 18   Wt 104.6 kg (230 lb 9.6 oz)   LMP  (LMP Unknown)   SpO2 97%   BMI 37.22 kg/m       O2: 97% on RA   Output: Pt has ferreira cath Pt continent of bowl   Last BM: 09/21/24   Activity: Independent   Skin: No known issues   Pain: Pain managed with scheduled and PRN pain meds. See MAR   CMS: AOX4. Pt has tenderness in flank areas. Pt denies dizziness, numbness, CP, SOB.   Diet: Regular diet, thin liquids, takes pills whole with water   LDA: L PIV SL   Plan: Continue POC   Additional Info: Pt has 1:1 sitter for safety. Pt is here for SI and has flank pain.

## 2024-09-25 NOTE — PLAN OF CARE
Goal Outcome Evaluation:  /84 (BP Location: Left arm)   Pulse 106   Temp 98.1  F (36.7  C) (Oral)   Resp 18   Wt 104.6 kg (230 lb 9.6 oz)   LMP  (LMP Unknown)   SpO2 96%   BMI 37.22 kg/m        Plan of Care Reviewed With: patient    Overall Patient Progress: improvingOverall Patient Progress: improving    Outcome Evaluation: pt alert and oriented x4, able to make needs known  Pt on 1:1 for SI. No SI behaviors or statements noted, pt on room air, PIV left hand SL, IV abx infusing, med for pain 8/10 flank, med with sched tylenol, prn oxycodone and dilaudid, pt uses nicotine gum for cigarette cravings. Amb in hallway ad joshua with bedside attendant, ferreira patent, draining orange/yellow urine, good fluid intake, appetite good, pt is pleasant and cooperative. For disposition home pending, continue plan of care

## 2024-09-25 NOTE — CONSULTS
Psychiatry Consultation; Follow up              Reason for Consult, requesting source:    Follow up   Requesting source: Ata Hodges    Labs and imaging reviewed, seen by BROOKLYN Nesbitt CNP          Interim history:    -No acute events overnight  -Reports slept better last night with better pain control   -Still denying SI, SIB, command hallucinations         Current Medications:     Current Facility-Administered Medications   Medication Dose Route Frequency Provider Last Rate Last Admin    acetaminophen (TYLENOL) tablet 975 mg  975 mg Oral Q6H Ata Hodges DO   975 mg at 09/25/24 1432    amLODIPine (NORVASC) tablet 10 mg  10 mg Oral Daily Sydney Subramanian CNP   10 mg at 09/25/24 0815    ARIPiprazole (ABILIFY) tablet 10 mg  10 mg Oral QAM Sydney Subramanian CNP   10 mg at 09/25/24 0813    diclofenac (VOLTAREN) 1 % topical gel 4 g  4 g Topical 4x Daily Ata Hodges DO   4 g at 09/24/24 2248    empagliflozin (JARDIANCE) tablet 10 mg  10 mg Oral Daily Sydney Subramanian CNP   10 mg at 09/25/24 0814    gabapentin (NEURONTIN) tablet 1,200 mg  1,200 mg Oral BID Sydney Subramanian CNP   1,200 mg at 09/25/24 0812    gabapentin (NEURONTIN) tablet 600 mg  600 mg Oral Q24H Ata Hodges DO   600 mg at 09/25/24 1311    insulin glargine (LANTUS PEN) injection 5 Units  5 Units Subcutaneous Daily Ata Hodges DO   5 Units at 09/25/24 0822    lithium (ESKALITH CR/LITHOBID) CR tablet 900 mg  900 mg Oral At Bedtime Sydney Subramanian CNP   900 mg at 09/24/24 2248    paliperidone (INVEGA) 24 hr tablet 4.5 mg  4.5 mg Oral Daily Shona Black APRN CNP   4.5 mg at 09/25/24 0813    PARoxetine (PAXIL) tablet 20 mg  20 mg Oral Daily Sydney Subramanian CNP   20 mg at 09/25/24 0814    phenazopyridine (PYRIDIUM) tablet 100 mg  100 mg Oral TID w/meals Ata Hodges DO   100 mg at 09/25/24 1311    polyethylene glycol (MIRALAX) Packet  "17 g  17 g Oral Daily Sydney Subramanian Iman Vinay, CNP   17 g at 09/25/24 0814    QUEtiapine (SEROquel) tablet 250 mg  250 mg Oral At Bedtime Marilynn, Sydneytiffanie Mclean CNP   250 mg at 09/24/24 2247    rosuvastatin (CRESTOR) tablet 40 mg  40 mg Oral Daily Marilynn Sydney Iman Vinay, CNP   40 mg at 09/25/24 0813    sodium chloride (PF) 0.9% PF flush 3 mL  3 mL Intracatheter Q8H Marilynn Sydney Iman Mclean, CNP   3 mL at 09/25/24 1015    testosterone (ANDROGEL/TESTIM) 50 MG/5GM (1%) topical gel 100 mg of testosterone  100 mg of testosterone Transdermal Daily Marilynn Sydneytiffanie Mclean CNP   100 mg of testosterone at 09/25/24 0814              MSE:   Appearance: awake, alert  Attitude:  cooperative  Eye Contact:  good  Mood:  better  Affect:  appropriate and in normal range and mood congruent  Speech:  clear, coherent  Language: Fluent in english   Psychomotor Behavior:  no evidence of tardive dyskinesia, dystonia, or tics  Thought Process:  logical, linear, and goal oriented  Associations:  no loose associations  Thought Content:  no evidence of suicidal ideation or homicidal ideation and no evidence of psychotic thought  Insight:  fair  Judgement:  fair  Oriented to:  time, person, and place  Attention Span and Concentration:  intact  Recent and Remote Memory:  intact  Fund of Knowledge: Appropriate   Gait and Station: baseline    Vital signs:  Temp: 98.1  F (36.7  C) Temp src: Oral BP: 129/84 Pulse: 106   Resp: 18 SpO2: 96 % O2 Device: None (Room air)     Weight: 104.6 kg (230 lb 9.6 oz)  Estimated body mass index is 37.22 kg/m  as calculated from the following:    Height as of 9/16/24: 1.676 m (5' 6\").    Weight as of this encounter: 104.6 kg (230 lb 9.6 oz).             DSM-5 Diagnosis:   Bipolar affective disorder, depressed vs Major depressive disorder, recurrent, severe with psychotic features;   Borderline personality disorder           Assessment:   Prakash is a 34 year old transgender male who transferred to " medicine from inpatient psychiatry 9/23 for renal calculi. He reports that the addition of invega has been helpful, still bothered by negative voices he is having. Denies any safety concerns - self injurious urges or suicidal ideation. Did increase invega to 4.5 to help with residual voices but no SI, SIB ,HI, command hallucinations and current hallucinations are not impairing daily functioning. He is hoping to discharge from medical to home.             Summary of Recommendations:     Continue medications without change  Continue 1:1  Patient is under commitment -- will need to work with commitment  Jie David at [phone] [email or web address] -- likely will be able to discharge from medical without returning to psychiatry       Shona Black, SOPHIA-BC  Consult/Liaison Psychiatry   [facility]

## 2024-09-25 NOTE — PLAN OF CARE
Goal Outcome Evaluation:       Alert and oriented X4.    Independent with ambulation.     1:1 for suicidal ideation (SI). Denied SI this shift.    Pain managed with IV dilaudid, oral oxycodone, gabapentin, tylenol, and Voltaren cream.    Has ferreira catheter. Left PIV saline locked.    Continue plan care.

## 2024-09-25 NOTE — PLAN OF CARE
Goal Outcome Evaluation:  Alert and oriented X 4.  Flat affect. Able to make needs known. Call light in reach.  C/O  bilateral flank pain. Received Dilaudid 0.5 mg IVP X 1 with + relief.  Up ad joshua,  Continent of bowel. Styles patent, draining sufficiently. Left PIV patent, saline locked.  Appears to be sleeping most of night. No new issues or changes overnight. Sitter at bedside for SI/safety. No suicidal ideations this shift.  Continue with plan of care.

## 2024-09-26 LAB
ANION GAP SERPL CALCULATED.3IONS-SCNC: 10 MMOL/L (ref 7–15)
BACTERIA UR CULT: ABNORMAL
BUN SERPL-MCNC: 16.6 MG/DL (ref 6–20)
CALCIUM SERPL-MCNC: 9.4 MG/DL (ref 8.8–10.4)
CHLORIDE SERPL-SCNC: 104 MMOL/L (ref 98–107)
CREAT SERPL-MCNC: 0.76 MG/DL (ref 0.51–1.17)
EGFRCR SERPLBLD CKD-EPI 2021: >90 ML/MIN/1.73M2
ERYTHROCYTE [DISTWIDTH] IN BLOOD BY AUTOMATED COUNT: 14.2 % (ref 10–15)
GLUCOSE BLDC GLUCOMTR-MCNC: 180 MG/DL (ref 70–99)
GLUCOSE BLDC GLUCOMTR-MCNC: 202 MG/DL (ref 70–99)
GLUCOSE BLDC GLUCOMTR-MCNC: 218 MG/DL (ref 70–99)
GLUCOSE BLDC GLUCOMTR-MCNC: 227 MG/DL (ref 70–99)
GLUCOSE SERPL-MCNC: 218 MG/DL (ref 70–99)
HCO3 SERPL-SCNC: 22 MMOL/L (ref 22–29)
HCT VFR BLD AUTO: 42.6 % (ref 35–53)
HGB BLD-MCNC: 14.1 G/DL (ref 11.7–17.7)
MCH RBC QN AUTO: 28.8 PG (ref 26.5–33)
MCHC RBC AUTO-ENTMCNC: 33.1 G/DL (ref 31.5–36.5)
MCV RBC AUTO: 87 FL (ref 78–100)
PLATELET # BLD AUTO: 239 10E3/UL (ref 150–450)
POTASSIUM SERPL-SCNC: 4.2 MMOL/L (ref 3.4–5.3)
RBC # BLD AUTO: 4.9 10E6/UL (ref 3.8–5.9)
SODIUM SERPL-SCNC: 136 MMOL/L (ref 135–145)
WBC # BLD AUTO: 9.7 10E3/UL (ref 4–11)

## 2024-09-26 PROCEDURE — 250N000013 HC RX MED GY IP 250 OP 250 PS 637: Performed by: PEDIATRICS

## 2024-09-26 PROCEDURE — 99232 SBSQ HOSP IP/OBS MODERATE 35: CPT | Performed by: PEDIATRICS

## 2024-09-26 PROCEDURE — 36415 COLL VENOUS BLD VENIPUNCTURE: CPT | Performed by: NURSE PRACTITIONER

## 2024-09-26 PROCEDURE — A9270 NON-COVERED ITEM OR SERVICE: HCPCS | Performed by: NURSE PRACTITIONER

## 2024-09-26 PROCEDURE — 120N000002 HC R&B MED SURG/OB UMMC

## 2024-09-26 PROCEDURE — 250N000013 HC RX MED GY IP 250 OP 250 PS 637

## 2024-09-26 PROCEDURE — 99232 SBSQ HOSP IP/OBS MODERATE 35: CPT

## 2024-09-26 PROCEDURE — 250N000013 HC RX MED GY IP 250 OP 250 PS 637: Performed by: NURSE PRACTITIONER

## 2024-09-26 PROCEDURE — 250N000011 HC RX IP 250 OP 636: Performed by: NURSE PRACTITIONER

## 2024-09-26 PROCEDURE — 80048 BASIC METABOLIC PNL TOTAL CA: CPT | Performed by: NURSE PRACTITIONER

## 2024-09-26 PROCEDURE — 250N000009 HC RX 250: Performed by: NURSE PRACTITIONER

## 2024-09-26 PROCEDURE — 250N000011 HC RX IP 250 OP 636: Performed by: PEDIATRICS

## 2024-09-26 PROCEDURE — 85027 COMPLETE CBC AUTOMATED: CPT | Performed by: NURSE PRACTITIONER

## 2024-09-26 RX ORDER — AMLODIPINE BESYLATE 5 MG/1
5 TABLET ORAL DAILY
Status: DISCONTINUED | OUTPATIENT
Start: 2024-09-27 | End: 2024-09-27 | Stop reason: HOSPADM

## 2024-09-26 RX ORDER — FUROSEMIDE 10 MG/ML
20 INJECTION INTRAMUSCULAR; INTRAVENOUS ONCE
Status: COMPLETED | OUTPATIENT
Start: 2024-09-26 | End: 2024-09-26

## 2024-09-26 RX ADMIN — OXYCODONE HYDROCHLORIDE 10 MG: 5 TABLET ORAL at 23:14

## 2024-09-26 RX ADMIN — TESTOSTERONE 100 MG OF TESTOSTERONE: 50 GEL TOPICAL at 08:35

## 2024-09-26 RX ADMIN — NICOTINE POLACRILEX 4 MG: 2 GUM, CHEWING BUCCAL at 05:34

## 2024-09-26 RX ADMIN — ONDANSETRON 4 MG: 2 INJECTION INTRAMUSCULAR; INTRAVENOUS at 13:16

## 2024-09-26 RX ADMIN — GABAPENTIN 1200 MG: 600 TABLET, FILM COATED ORAL at 08:37

## 2024-09-26 RX ADMIN — OXYCODONE HYDROCHLORIDE 10 MG: 5 TABLET ORAL at 18:54

## 2024-09-26 RX ADMIN — OXYCODONE HYDROCHLORIDE 10 MG: 5 TABLET ORAL at 03:23

## 2024-09-26 RX ADMIN — NICOTINE POLACRILEX 4 MG: 2 GUM, CHEWING BUCCAL at 11:41

## 2024-09-26 RX ADMIN — CLONAZEPAM 0.5 MG: 0.5 TABLET ORAL at 11:32

## 2024-09-26 RX ADMIN — OXYCODONE HYDROCHLORIDE 10 MG: 5 TABLET ORAL at 10:23

## 2024-09-26 RX ADMIN — LITHIUM CARBONATE 900 MG: 450 TABLET, EXTENDED RELEASE ORAL at 21:22

## 2024-09-26 RX ADMIN — HYDROMORPHONE HYDROCHLORIDE 0.5 MG: 1 INJECTION, SOLUTION INTRAMUSCULAR; INTRAVENOUS; SUBCUTANEOUS at 13:11

## 2024-09-26 RX ADMIN — PAROXETINE HYDROCHLORIDE 20 MG: 10 TABLET, FILM COATED ORAL at 08:36

## 2024-09-26 RX ADMIN — HYDROXYZINE HYDROCHLORIDE 25 MG: 25 TABLET, FILM COATED ORAL at 17:21

## 2024-09-26 RX ADMIN — AMLODIPINE BESYLATE 10 MG: 10 TABLET ORAL at 08:37

## 2024-09-26 RX ADMIN — NICOTINE POLACRILEX 4 MG: 2 GUM, CHEWING BUCCAL at 09:17

## 2024-09-26 RX ADMIN — ACETAMINOPHEN 975 MG: 325 TABLET ORAL at 14:53

## 2024-09-26 RX ADMIN — HYDROMORPHONE HYDROCHLORIDE 0.5 MG: 1 INJECTION, SOLUTION INTRAMUSCULAR; INTRAVENOUS; SUBCUTANEOUS at 17:21

## 2024-09-26 RX ADMIN — ROSUVASTATIN 40 MG: 20 TABLET, FILM COATED ORAL at 08:36

## 2024-09-26 RX ADMIN — CEFTRIAXONE SODIUM 2 G: 2 INJECTION, POWDER, FOR SOLUTION INTRAMUSCULAR; INTRAVENOUS at 17:21

## 2024-09-26 RX ADMIN — INSULIN GLARGINE 5 UNITS: 100 INJECTION, SOLUTION SUBCUTANEOUS at 08:44

## 2024-09-26 RX ADMIN — PHENAZOPYRIDINE 100 MG: 100 TABLET ORAL at 11:31

## 2024-09-26 RX ADMIN — HYDROMORPHONE HYDROCHLORIDE 0.5 MG: 1 INJECTION, SOLUTION INTRAMUSCULAR; INTRAVENOUS; SUBCUTANEOUS at 05:34

## 2024-09-26 RX ADMIN — EMPAGLIFLOZIN 10 MG: 10 TABLET, FILM COATED ORAL at 08:37

## 2024-09-26 RX ADMIN — GABAPENTIN 600 MG: 600 TABLET, FILM COATED ORAL at 13:11

## 2024-09-26 RX ADMIN — PALIPERIDONE 4.5 MG: 1.5 TABLET, EXTENDED RELEASE ORAL at 08:36

## 2024-09-26 RX ADMIN — PHENAZOPYRIDINE 100 MG: 100 TABLET ORAL at 08:36

## 2024-09-26 RX ADMIN — GABAPENTIN 1200 MG: 600 TABLET, FILM COATED ORAL at 21:22

## 2024-09-26 RX ADMIN — LIDOCAINE: 40 CREAM TOPICAL at 17:23

## 2024-09-26 RX ADMIN — NICOTINE POLACRILEX 4 MG: 2 GUM, CHEWING BUCCAL at 23:14

## 2024-09-26 RX ADMIN — POLYETHYLENE GLYCOL 3350 17 G: 17 POWDER, FOR SOLUTION ORAL at 08:47

## 2024-09-26 RX ADMIN — HYDROMORPHONE HYDROCHLORIDE 0.5 MG: 1 INJECTION, SOLUTION INTRAMUSCULAR; INTRAVENOUS; SUBCUTANEOUS at 09:16

## 2024-09-26 RX ADMIN — FUROSEMIDE 20 MG: 10 INJECTION, SOLUTION INTRAMUSCULAR; INTRAVENOUS at 11:31

## 2024-09-26 RX ADMIN — HYDROMORPHONE HYDROCHLORIDE 0.5 MG: 1 INJECTION, SOLUTION INTRAMUSCULAR; INTRAVENOUS; SUBCUTANEOUS at 21:22

## 2024-09-26 RX ADMIN — ACETAMINOPHEN 975 MG: 325 TABLET ORAL at 03:23

## 2024-09-26 RX ADMIN — ARIPIPRAZOLE 10 MG: 10 TABLET ORAL at 08:36

## 2024-09-26 RX ADMIN — NICOTINE POLACRILEX 4 MG: 2 GUM, CHEWING BUCCAL at 13:11

## 2024-09-26 RX ADMIN — OXYCODONE HYDROCHLORIDE 10 MG: 5 TABLET ORAL at 14:53

## 2024-09-26 RX ADMIN — HYDROMORPHONE HYDROCHLORIDE 0.5 MG: 1 INJECTION, SOLUTION INTRAMUSCULAR; INTRAVENOUS; SUBCUTANEOUS at 01:35

## 2024-09-26 RX ADMIN — HYDROXYZINE HYDROCHLORIDE 25 MG: 25 TABLET, FILM COATED ORAL at 10:23

## 2024-09-26 RX ADMIN — ACETAMINOPHEN 975 MG: 325 TABLET ORAL at 08:36

## 2024-09-26 RX ADMIN — QUETIAPINE 250 MG: 100 TABLET ORAL at 21:22

## 2024-09-26 ASSESSMENT — ACTIVITIES OF DAILY LIVING (ADL)
ADLS_ACUITY_SCORE: 39
ADLS_ACUITY_SCORE: 40
ADLS_ACUITY_SCORE: 39
ADLS_ACUITY_SCORE: 39
ADLS_ACUITY_SCORE: 40
ADLS_ACUITY_SCORE: 39

## 2024-09-26 NOTE — CONSULTS
Consulted to run a test claim/start PA for Invvelia Andujar.    Patient has pharmacy benefits through [employer] Part [employer] & [facility]. Per insurance, the following are covered and preferred under the patient's pharmacy benefits:     Invega Sustenna - $0       If patient is receiving this injection in-clinic, medication will need to be billed under medical benefits. Discharge pharmacy liaison is unable to verify coverage or initiate prior auth under the medical benefit. To verify coverage under medical benefits, patient, SW, RNCC, CM, or other member of care team may:    Contact Member Services department of patient's insurance, OR   Complete a cost of care estimate request by calling [phone] or via [email or web address]  Only valid to check benefits if receiving injection at an [facility] or Infusion Site  Turnaround time is about 1 to 3 business days for this estimate      When an outpatient order for this injection is placed in chart along with an appointment for administration at an [facility]/infusion center, coverage and authorization will be secured by the CAM and Infusion Finance teams.      Please feel free to contact me with any other test claims, prior authorizations, or insurance questions regarding outpatient medications.     Thanks!      Bisi Ford [facility]  Discharge Pharmacy Liaison  [facility]  Pronouns: She/Her/Hers    Securely message with IXcellerate, Epic Secure Chat, or [email or web address]  Phone: [phone]  Fax: [phone]  [email or web address]

## 2024-09-26 NOTE — PLAN OF CARE
Goal Outcome Evaluation:  /84 (BP Location: Right arm)   Pulse 92   Temp 98  F (36.7  C) (Oral)   Resp 16   Wt 104.6 kg (230 lb 9.6 oz)   LMP  (LMP Unknown)   SpO2 95%   BMI 37.22 kg/m        Plan of Care Reviewed With: patient    Overall Patient Progress: no changeOverall Patient Progress: no change    Outcome Evaluation: pt is alert and oriented x4, able to make needs known no SI    1:1 sitter discontinued 1330.  Med for pain 7-8/10 with prn oxycodone and iv dilaudid, pt states he is trying not to use the iv pain meds, but the flank and abd pain continues. Styles changed with patient permission  #16 Fr new stat lock placed on upper left thigh, did catheter care teaching with pt and how to change fron drainage bag to a leg bag. Pt requested lidocaine prior to catheter placement due to discomfort. PIV SL, maged iv abx. Pt has questions about  discharge and not being able to reach , will update SW. Dispo to home hopefully tomorrow cont plan of care

## 2024-09-26 NOTE — PLAN OF CARE
Goal Outcome Evaluation:      Plan of Care Reviewed With: patient    Overall Patient Progress: no changeOverall Patient Progress: no change    Outcome Evaluation: PRNs given for pain, denies SI, ferreira draining adequately, Sitter at bedside for SI. Pt did complain about very slight edema in L ankle, nonpitting, no tenderness, no pain with walking or movement, reported no incident that could cause swelling, no concerns other than slightly more swollen then other ankle, writer had pt elevate it with pillow when able.

## 2024-09-26 NOTE — PROGRESS NOTES
[facility]    Medicine Progress Note - Hospitalist Service, GOLD TEAM 22    Date of Admission:  9/24/2024    Assessment & Plan   Prakash Prasad is a 34 year old female to 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 adult with past medical history significant for TBI, type 2 DM, urinary retention, neurogenic bladder, GERD, ADHD, bipolar 1 disorder, polysubstance use disorder, borderline personality disorder, and schizoaffective disorder initially admitted to station 30 on 9/13/24 for suicidal ideation.  He developed worsening flank pain on 9/22 along with reported tea colored urine.  He is transferred to internal medicine service for management of flank pain refractory to oral medications.      Bilateral flank pain due to nephrolithiasis, non obstructive  - oxycodone 5-10 mg q4h prn moderate to severe pain, scheduled tylenol, topical voltaren, IV Dilaudid 0.3-0.5mg Q3H PRN breakthrough  - Lidocaine patches discontinued, unhelpful  - Zofran 4-8mg PO or IV Q6H PRN    UTI - suspect simple cystitis that could have been provoked by placement of Ferreira; will consider complicated UTI in presence of Ferreira  - culture growing GNR not yet speciated  - continue rocephin with probable transition to oral antibiotics tomorrow pending identification of bug and susceptibilities  - Ferreira exchange today given infection    Bladder distention due to neurogenic bladder c/b cauda equina syndrome in setting of SCI (2016)  Has had issues with urinary retention intermittently since accident in 2016, last seen by Urology in 2019 and discussed CIC vs suprapubic catheter placement.    - Continue tamsulosin 0.4mg daily   - added pyridium for bladder spasm today 9/24  - Monitor I/Os  - Continue ferreira with routine cares -- swap today given infection  - Urology follow up outpatient for urodynamic studies and consideration of suprapubic cath or conduit, would not do inpatient unless extenuating circumstances  - abilify, paxil, and  "clonazepam can all cause urinary retention    Constipation due to neurogenic bowel  # ?Hematochezia vs melena vs hemorrhoids   - Bowel regimen with scheduled miralax and senna   - Continue simethicone PRN     Suicidal ideation in context of Bipolar 1 disorder with depression, ADHD, schizoaffective; KIERRA in remission  Admitted for anxiety, depression, and SI with SIB.  - Psychiatry consult placed for ongoing management  - Continue Abilify 10mg every morning   - Continue Paxil 20mg daily   - Continue Invega 3mg daily --> increased to 4.5 mg daily on 9/24  - Continue Gabapentin 1200mg BID   - Continue Lithium 900mg   - Continue Seroquel 250mg HS   - Continue SIO for hx self injurious behavior     HTN   - Continue PTA amlodipine     HLD   - Continue PTA statin     Type 2 DM   Well controlled. Hb A1c 6.7 8/7/24. Current regimen: Jardiance and Rybelsus (semaglutide). Hx of being on long acting insulin.  He does not have means to get his Rybelsus brought into the hospital, agreeable to long-acting insulin for now.  - BG checks 3 times daily before meals  - Continue PTA Jardiance  - Substitute Lantus 5 mg in place of PTA Rybelsus  - Hypoglycemia protocol       Diet: Regular Diet Adult    DVT Prophylaxis: Ambulate every shift  Styles Catheter: PRESENT, indication: Acute retention or obstruction, Acute retention or obstruction  Lines: None     Cardiac Monitoring: None  Code Status: Full Code      Clinically Significant Risk Factors                  # Hypertension: Noted on problem list          # DMII: A1C = 6.7 % (Ref range: 0.0 - 5.6 %) within past 6 months, PRESENT ON ADMISSION  # Obesity: Estimated body mass index is 37.22 kg/m  as calculated from the following:    Height as of 9/16/24: 1.676 m (5' 6\").    Weight as of this encounter: 104.6 kg (230 lb 9.6 oz)., PRESENT ON ADMISSION     # Financial/Environmental Concerns: none         Disposition Plan     Medically Ready for Discharge: Anticipated Tomorrow      Ata " DO Carroll  Hospitalist Service, GOLD TEAM 22  [facility]  Securely message with InMobi (more info)  Text page via [employer] Paging/Directory   See signed in provider for up to date coverage information  ______________________________________________________________________    Interval History   Still having pain, marginally improved. Patient feels pyridium and antibiotics have helped. Still asking for hydromorphone quite a bit.    Physical Exam   Vital Signs: Temp: 98  F (36.7  C) Temp src: Oral BP: 133/84 Pulse: 92   Resp: 16 SpO2: 95 % O2 Device: None (Room air)    Weight: 230 lbs 9.62 oz    Sitting up in chair in no distress  Awake, alert  Speech is clear and coherent  RR and WOB normal  Left foot and ankle swelling without erythema or pain with movement, has full active ROM    MDM: high  See problem list above  Reviewed urine culture, metabolic panel, cbc, psych notes  high risk due to need for continued inpatient management of complicated UTI awaiting results of urine culture and susceptibilities

## 2024-09-26 NOTE — PROGRESS NOTES
Care Management Follow Up    Length of Stay (days): 2    Expected Discharge Date: 09/27/2024     Concerns to be Addressed: discharge planning     Patient plan of care discussed at interdisciplinary rounds: Yes    Anticipated Discharge Disposition: Home     Anticipated Discharge Services:     Psychiatrist/Medication Management  Regine Last, CNP, APRN   [facility]  Phone: [phone]  Fax: [phone]     ABIMAEL Shaffer   [facility]   Phone: [phone]      CADI Worker  Lydia Britt   [facility]  Phone: [phone]     Anticipated Discharge DME: None    Patient/family educated on Medicare website which has current facility and service quality ratings: no  Education Provided on the Discharge Plan: Yes  Patient/Family in Agreement with the Plan:  Yes    Referrals Placed by CM/SW:  None at this time  Private pay costs discussed: Not applicable    Discussed  Partnership in Safe Discharge Planning  document with patient/family: No     Handoff Completed: Yes, [facility] PCP: Internal handoff referral completed    Additional Information:    Civil Commitment Information:  Type of Commitment (MI, CD, MICD): [facility]: [facility] File Number: [id number]  Date of Commitment: 6/9/23 (stay of commitment); 8/15/23 (full commitment); 2/9/2024 (continued commitment)  Date Commitment Ends: 12/14/2024  Commitment : Jie David (P: [phone]; E: [email or web address])  Ashley or Price Leonard? NONE.    Provisional Discharge revoked 9/18/2024. New PD needed at discharge.    ___________________________________________      AUGIE attempted to call patient's , Jie. Left VM requesting call back to discuss discharge plan.    SW spoke with patient at bedside. Patient feels good about being discharged tomorrow. He reports that he lives in an [employer] apartment. This means that he has his own apartment but has staff check in and provide support as needed. This is  not a group home or assisted living. No need to connect with anyone related to patient's living environment as patient essentially lives alone and receives PCA services.      Next Steps:     Connect with Jie to confirm discharge plan.    Complete Provisional Discharge, have patient sign, and send to .        ELI LundbergW, LSW  8 Med Surg   [facility]  Phone: [phone]  Vocera: Searchable by name or group: 8 Med Surg Vocera

## 2024-09-27 VITALS
RESPIRATION RATE: 16 BRPM | OXYGEN SATURATION: 95 % | DIASTOLIC BLOOD PRESSURE: 84 MMHG | SYSTOLIC BLOOD PRESSURE: 124 MMHG | HEART RATE: 98 BPM | BODY MASS INDEX: 37.22 KG/M2 | WEIGHT: 230.6 LBS | TEMPERATURE: 98.3 F

## 2024-09-27 LAB — GLUCOSE BLDC GLUCOMTR-MCNC: 144 MG/DL (ref 70–99)

## 2024-09-27 PROCEDURE — 99232 SBSQ HOSP IP/OBS MODERATE 35: CPT

## 2024-09-27 PROCEDURE — 99239 HOSP IP/OBS DSCHRG MGMT >30: CPT | Performed by: PEDIATRICS

## 2024-09-27 PROCEDURE — 250N000013 HC RX MED GY IP 250 OP 250 PS 637

## 2024-09-27 PROCEDURE — 250N000013 HC RX MED GY IP 250 OP 250 PS 637: Performed by: NURSE PRACTITIONER

## 2024-09-27 PROCEDURE — 250N000013 HC RX MED GY IP 250 OP 250 PS 637: Performed by: PEDIATRICS

## 2024-09-27 PROCEDURE — 250N000011 HC RX IP 250 OP 636: Performed by: NURSE PRACTITIONER

## 2024-09-27 RX ORDER — PAROXETINE 20 MG/1
20 TABLET, FILM COATED ORAL DAILY
Qty: 90 TABLET | Refills: 0 | Status: SHIPPED | OUTPATIENT
Start: 2024-09-28

## 2024-09-27 RX ORDER — GABAPENTIN 600 MG/1
TABLET ORAL
Qty: 120 TABLET | Refills: 0 | Status: SHIPPED | OUTPATIENT
Start: 2024-09-27

## 2024-09-27 RX ORDER — CEFDINIR 300 MG/1
300 CAPSULE ORAL EVERY 12 HOURS
Qty: 11 CAPSULE | Refills: 0 | Status: SHIPPED | OUTPATIENT
Start: 2024-09-27

## 2024-09-27 RX ORDER — AMLODIPINE BESYLATE 5 MG/1
5 TABLET ORAL DAILY
Qty: 90 TABLET | Refills: 0 | Status: SHIPPED | OUTPATIENT
Start: 2024-09-28

## 2024-09-27 RX ORDER — PALIPERIDONE 1.5 MG/1
4.5 TABLET, EXTENDED RELEASE ORAL EVERY MORNING
Qty: 180 TABLET | Refills: 0 | Status: SHIPPED | OUTPATIENT
Start: 2024-09-27

## 2024-09-27 RX ORDER — OXYCODONE HYDROCHLORIDE 5 MG/1
5-10 TABLET ORAL EVERY 6 HOURS PRN
Qty: 8 TABLET | Refills: 0 | Status: SHIPPED | OUTPATIENT
Start: 2024-09-27

## 2024-09-27 RX ORDER — CEFDINIR 300 MG/1
300 CAPSULE ORAL EVERY 12 HOURS SCHEDULED
Status: DISCONTINUED | OUTPATIENT
Start: 2024-09-27 | End: 2024-09-27 | Stop reason: HOSPADM

## 2024-09-27 RX ORDER — ACETAMINOPHEN 325 MG/1
975 TABLET ORAL EVERY 6 HOURS
Qty: 100 TABLET | Refills: 0 | Status: SHIPPED | OUTPATIENT
Start: 2024-09-27

## 2024-09-27 RX ORDER — ARIPIPRAZOLE 10 MG/1
10 TABLET ORAL EVERY MORNING
Qty: 90 TABLET | Refills: 0 | Status: SHIPPED | OUTPATIENT
Start: 2024-09-28

## 2024-09-27 RX ORDER — TRAZODONE HYDROCHLORIDE 50 MG/1
50 TABLET, FILM COATED ORAL
Qty: 60 TABLET | Refills: 0 | Status: SHIPPED | OUTPATIENT
Start: 2024-09-27

## 2024-09-27 RX ORDER — CYCLOBENZAPRINE HCL 10 MG
10 TABLET ORAL EVERY 8 HOURS PRN
Qty: 30 TABLET | Refills: 0 | Status: SHIPPED | OUTPATIENT
Start: 2024-09-27

## 2024-09-27 RX ORDER — CLONAZEPAM 0.5 MG/1
0.5 TABLET ORAL 2 TIMES DAILY PRN
Qty: 30 TABLET | Refills: 0 | Status: SHIPPED | OUTPATIENT
Start: 2024-09-27

## 2024-09-27 RX ADMIN — OXYCODONE HYDROCHLORIDE 10 MG: 5 TABLET ORAL at 03:25

## 2024-09-27 RX ADMIN — PALIPERIDONE 4.5 MG: 1.5 TABLET, EXTENDED RELEASE ORAL at 08:32

## 2024-09-27 RX ADMIN — GABAPENTIN 1200 MG: 600 TABLET, FILM COATED ORAL at 08:32

## 2024-09-27 RX ADMIN — AMLODIPINE BESYLATE 5 MG: 5 TABLET ORAL at 08:32

## 2024-09-27 RX ADMIN — INSULIN GLARGINE 5 UNITS: 100 INJECTION, SOLUTION SUBCUTANEOUS at 08:34

## 2024-09-27 RX ADMIN — NICOTINE POLACRILEX 4 MG: 2 GUM, CHEWING BUCCAL at 09:29

## 2024-09-27 RX ADMIN — NICOTINE POLACRILEX 4 MG: 2 GUM, CHEWING BUCCAL at 02:13

## 2024-09-27 RX ADMIN — EMPAGLIFLOZIN 10 MG: 10 TABLET, FILM COATED ORAL at 08:31

## 2024-09-27 RX ADMIN — POLYETHYLENE GLYCOL 3350 17 G: 17 POWDER, FOR SOLUTION ORAL at 08:33

## 2024-09-27 RX ADMIN — ACETAMINOPHEN 975 MG: 325 TABLET ORAL at 03:25

## 2024-09-27 RX ADMIN — ACETAMINOPHEN 975 MG: 325 TABLET ORAL at 09:28

## 2024-09-27 RX ADMIN — OXYCODONE HYDROCHLORIDE 10 MG: 5 TABLET ORAL at 08:29

## 2024-09-27 RX ADMIN — HYDROMORPHONE HYDROCHLORIDE 0.5 MG: 1 INJECTION, SOLUTION INTRAMUSCULAR; INTRAVENOUS; SUBCUTANEOUS at 01:33

## 2024-09-27 RX ADMIN — ARIPIPRAZOLE 10 MG: 10 TABLET ORAL at 08:31

## 2024-09-27 RX ADMIN — ROSUVASTATIN 40 MG: 20 TABLET, FILM COATED ORAL at 08:32

## 2024-09-27 RX ADMIN — CLONAZEPAM 0.5 MG: 0.5 TABLET ORAL at 02:13

## 2024-09-27 RX ADMIN — HYDROXYZINE HYDROCHLORIDE 25 MG: 25 TABLET, FILM COATED ORAL at 09:29

## 2024-09-27 RX ADMIN — PAROXETINE HYDROCHLORIDE 20 MG: 10 TABLET, FILM COATED ORAL at 08:30

## 2024-09-27 ASSESSMENT — ACTIVITIES OF DAILY LIVING (ADL)
TOILETING_ISSUES: NO
ADLS_ACUITY_SCORE: 24
WEAR_GLASSES_OR_BLIND: YES
ADLS_ACUITY_SCORE: 39
ADLS_ACUITY_SCORE: 39
DOING_ERRANDS_INDEPENDENTLY_DIFFICULTY: NO
HEARING_DIFFICULTY_OR_DEAF: NO
ADLS_ACUITY_SCORE: 39
WALKING_OR_CLIMBING_STAIRS_DIFFICULTY: NO
CONCENTRATING,_REMEMBERING_OR_MAKING_DECISIONS_DIFFICULTY: NO
ADLS_ACUITY_SCORE: 39
DIFFICULTY_EATING/SWALLOWING: NO
FALL_HISTORY_WITHIN_LAST_SIX_MONTHS: NO
ADLS_ACUITY_SCORE: 39
ADLS_ACUITY_SCORE: 39
ADLS_ACUITY_SCORE: 24
DIFFICULTY_COMMUNICATING: NO
DRESSING/BATHING_DIFFICULTY: NO
ADLS_ACUITY_SCORE: 39
CHANGE_IN_FUNCTIONAL_STATUS_SINCE_ONSET_OF_CURRENT_ILLNESS/INJURY: NO

## 2024-09-27 NOTE — PROGRESS NOTES
Care Management Discharge Note    Discharge Date: 09/27/2024       Discharge Disposition: Home    Discharge Services:     Psychiatrist/Medication Management  Regine Last CNP, APRN   [facility]  Phone: [phone]  Fax: [phone]     ABIMAEL Shaffer   [facility]   Phone: [phone]      CADI Worker  Lydia Britt   [facility]  Phone: [phone]     Discharge DME: None    Discharge Transportation: family or friend will provide    Private pay costs discussed: Not applicable    Does the patient's insurance plan have a 3 day qualifying hospital stay waiver?  Yes     Which insurance plan 3 day waiver is available? Alternative insurance waiver    Will the waiver be used for post-acute placement? No    PAS Confirmation Code:  N/A  Patient/family educated on Medicare website which has current facility and service quality ratings: no    Education Provided on the Discharge Plan: Yes  Persons Notified of Discharge Plans: bedside nurse, charge nurse, patient, provider, facility,   Patient/Family in Agreement with the Plan:  yes    Handoff Referral Completed: Yes, [facility] PCP: Internal handoff referral completed    Additional Information:    Civil Commitment Information:  Type of Commitment (MI, CD, MICD): [facility]: [facility] File Number: [id number]  Date of Commitment: 6/9/23 (stay of commitment); 8/15/23 (full commitment); 2/9/2024 (continued commitment)  Date Commitment Ends: 12/14/2024  Commitment : Jie David (P: [phone]; E: [email or web address])  Ashley or Bonner Leonard? NONE.     Provisional Discharge revoked 9/18/2024. New PD needed at discharge.     ___________________________________________    Patient is cleared to discharge home today from medicine and psychiatry.     9:24 AM  SW attempted to call patient's  again, no answer. Opting to send email. SW sent secure email to patient stating that patient will discharge today and asking for  approval/what she needs from AUGIE.    Hospitalist spoke with Jie and Jie is agreeable to discharge plan. Hospitalist ordering medications to discharge pharmacy.    SW met with patient at bedside and presented Provisional Discharge. Explained PD, patient had no questions. Patient signed and declined copy.  PD scanned to email and sent via secure email to Jie.         MICKEY Lundberg, LSW  8 Med Surg   [facility]  Phone: [phone]  Vocera: Searchable by name or group: 8 Med Surg Vocera

## 2024-09-27 NOTE — CONSULTS
Psychiatry Consultation; Follow up              Reason for Consult, requesting source:      Requesting source: Ata Hodges    Labs and imaging reviewed, seen by BROOKLYN Nesbitt CNP          Interim history:    -Bedside sitter discontinued yesterday, doing well off sitter  --Again today denies suicidal ideation, thoughts of self-harm, voices decreased no command hallucinations   -Less anxious today about discharge, feels ready. Was talking to [facility] worker when I walked in         Current Medications:     Current Facility-Administered Medications   Medication Dose Route Frequency Provider Last Rate Last Admin    acetaminophen (TYLENOL) tablet 975 mg  975 mg Oral Q6H Ata Hodges DO   975 mg at 09/27/24 0928    amLODIPine (NORVASC) tablet 5 mg  5 mg Oral Daily Ata Hodges DO   5 mg at 09/27/24 0832    ARIPiprazole (ABILIFY) tablet 10 mg  10 mg Oral QAM Sydney Subramanian CNP   10 mg at 09/27/24 0831    cefdinir (OMNICEF) capsule 300 mg  300 mg Oral Q12H [facility] (08/20) Ata Hodges DO        diclofenac (VOLTAREN) 1 % topical gel 4 g  4 g Topical 4x Daily Ata Hodges DO   4 g at 09/24/24 2248    empagliflozin (JARDIANCE) tablet 10 mg  10 mg Oral Daily Sydney Subramanian CNP   10 mg at 09/27/24 0831    gabapentin (NEURONTIN) tablet 1,200 mg  1,200 mg Oral BID Sydney Subramanian CNP   1,200 mg at 09/27/24 0832    gabapentin (NEURONTIN) tablet 600 mg  600 mg Oral Q24H Ata Hodges DO   600 mg at 09/26/24 1311    insulin glargine (LANTUS PEN) injection 5 Units  5 Units Subcutaneous Daily Ata Hodges DO   5 Units at 09/27/24 0834    lithium (ESKALITH CR/LITHOBID) CR tablet 900 mg  900 mg Oral At Bedtime Sydney Subramanian CNP   900 mg at 09/26/24 2122    paliperidone (INVEGA) 24 hr tablet 4.5 mg  4.5 mg Oral Daily Shona Black APRN CNP   4.5 mg at 09/27/24 0832    PARoxetine (PAXIL) tablet 20 mg  20 mg Oral Daily Sydney Subramanian  "Iman Mclean CNP   20 mg at 09/27/24 0830    polyethylene glycol (MIRALAX) Packet 17 g  17 g Oral Daily Sydney Subramanian Vinay, CNP   17 g at 09/27/24 0833    QUEtiapine (SEROquel) tablet 250 mg  250 mg Oral At Bedtime Sydney Subramanianil Vinay, CNP   250 mg at 09/26/24 2122    rosuvastatin (CRESTOR) tablet 40 mg  40 mg Oral Daily Sydney Subramanian Iman Vinya, CNP   40 mg at 09/27/24 0832    sodium chloride (PF) 0.9% PF flush 3 mL  3 mL Intracatheter Q8H Sydney Subramanian Vinay, CNP   3 mL at 09/27/24 0835    testosterone (ANDROGEL/TESTIM) 50 MG/5GM (1%) topical gel 100 mg of testosterone  100 mg of testosterone Transdermal Daily Sydney Subramanian, CNP   100 mg of testosterone at 09/26/24 0835              MSE:   Appearance: awake, alert and adequately groomed  Attitude:  cooperative  Eye Contact:  good  Mood:  fine   Affect:  appropriate and in normal range and mood congruent  Speech:  clear, coherent  Language: Fluent in english   Psychomotor Behavior:  no evidence of tardive dyskinesia, dystonia, or tics  Thought Process:  logical, linear, and goal oriented  Associations:  no loose associations  Thought Content:  no evidence of suicidal ideation or homicidal ideation and no evidence of psychotic thought  Insight:  good  Judgement:  intact  Oriented to:  time, person, and place  Attention Span and Concentration:  intact  Recent and Remote Memory:  intact  Fund of Knowledge: Appropriate   Gait and Station: baseline       Vital signs:  Temp: 98.3  F (36.8  C) Temp src: Oral BP: 124/84 Pulse: 98   Resp: 16   O2 Device: None (Room air)     Weight: 104.6 kg (230 lb 9.6 oz)  Estimated body mass index is 37.22 kg/m  as calculated from the following:    Height as of 9/16/24: 1.676 m (5' 6\").    Weight as of this encounter: 104.6 kg (230 lb 9.6 oz).             DSM-5 Diagnosis:   Bipolar affective disorder, depressed vs Major depressive disorder, recurrent, severe with psychotic features;   Borderline " personality disorder           Assessment:   Prakash is a 34 year old transgender male who transferred to medicine from inpatient psychiatry 9/23 for renal calculi.  He remains psychiatrically stable without any suicidal ideation, self-harm, commanding hallucinations. Patient doing great independently off of bedside sitter. Given his ongoing stability, overall risk score is low and patient has wonderful outpatient supports. Able to contract for safety and feels ready for discharge.               Summary of Recommendations:     Continue medications without change  Agree with provisional discharge to home     Signing off    SOPHIA Jorgensen-BC  Consult/Liaison Psychiatry   [facility]

## 2024-09-27 NOTE — DISCHARGE INSTRUCTIONS
ADDITIONAL SUPPORTS:  Call or text 605 - National Suicide & Crisis Lifeline - if you feel like you may be in crisis or having thoughts of suicide.    [facility] [place] [facility] [place] ([facility]) provides support groups and educational programs. Visit www.namimn.org Helpline at 1-666.267.4353 or 573-185-9200 for further information.   [facility] ([place] [facility] Illness) improves the lives of children and adults with mental illnesses and their families by providing free classes on mental illnesses andsupport groups for adults with mental illnesses, parents and family members.   For more information:  Phone: 124.601.2010  Toll free: 0-608-HCRW-HELPS  Website: www.namihelps.org      The [place] [facility] provides a nsno-io-rmav approach to mental health recovery, support and wellness. Calls are answered by our team of professionally trained Certified Peer Specialists, who have first hand experience living with a mental health condition.   Open Monday-Saturday, 5pm to 10pm. Call [phone].       You may contact the [facility] for brief, short-term solution focused therapy support with your mental health goals. Call [phone] for more information or to schedule. (Virtual Appointments)          [facility]: Thank you for your interest in [facility]. Currently, patients are experiencing long waits for intake when referred within [facility]. Please know that you may contact your insurance carrier member services to learn more about scheduling in network therapy. Your insurance company will have lists of in network therapists that are not within [facility] and may have more immediate availability.       Get care started with an ongoing therapist by calling to schedule your intake at one of the following clinics:    [facility]: [phone]  [facility]  [phone]  [facility] [phone]  [facility]  [phone]  [facility] [phone]  [facility]  [phone]   [facility] [phone]  [facility] [phone]    Call an [facility] Behavioral Coordinator at [phone] for assistance in scheduling mental health appointments (Psychiatry/medication management, therapy, support groups, neuropsych testing, intake for programmatic care such as IOP/PHP program, etc.)

## 2024-09-27 NOTE — PLAN OF CARE
Goal Outcome Evaluation:      Plan of Care Reviewed With: patient    VS: /84 (BP Location: Right arm)   Pulse 98   Temp 98.3  F (36.8  C) (Oral)   Resp 16   Wt 104.6 kg (230 lb 9.6 oz)   LMP  (LMP Unknown)   SpO2 95%   BMI 37.22 kg/m      O2: Stable on RA, no complaints of SOB or chest pain.   Output: Ferreira in place, patient discharging with ferreira. MD Ata Hodges aware.   Last BM: 9/25/24   Activity: Independent.    Skin: Visible skin intact.   Pain: Managed with scheduled and PRN pain meds.   CMS: A&Ox4   Dressing: N/A   Diet: Regular. No complaints of nausea or vomiting.   LDA: None   Equipment: Personal belongings.   Plan: Discharge to home.   Additional Info:       Pt discharged today at 1330 after being seen by MD.  Pt received all medications prescribed to discharge pharmacy including narcotic: Yes.  Pt received AVS and discussed any questions with the nurse: Yes.  Pt discharged to home and got there with friend.  Pt signed for all medications: Yes.   Pt belongings returned at discharge: Yes.

## 2024-09-27 NOTE — PLAN OF CARE
Goal Outcome Evaluation:      Plan of Care Reviewed With: patient    Overall Patient Progress: improvingOverall Patient Progress: improving    Outcome Evaluation: no SI during shift, PRNs given for pain, nicotine gum given PRN, ferreira draining adequately, no acute concerns overnight.

## 2024-09-30 ENCOUNTER — VIRTUAL VISIT (OUTPATIENT)
Dept: DERMATOLOGY | Facility: CLINIC | Age: 34
End: 2024-09-30
Attending: DERMATOLOGY
Payer: MEDICARE

## 2024-09-30 ENCOUNTER — MYC MEDICAL ADVICE (OUTPATIENT)
Dept: FAMILY MEDICINE | Facility: CLINIC | Age: 34
End: 2024-09-30

## 2024-09-30 DIAGNOSIS — L70.0 ACNE VULGARIS: Primary | ICD-10-CM

## 2024-09-30 PROCEDURE — 99441 PR PHYSICIAN TELEPHONE EVALUATION 5-10 MIN: CPT | Mod: 93 | Performed by: DERMATOLOGY

## 2024-09-30 RX ORDER — CLINDAMYCIN PHOSPHATE AND BENZOYL PEROXIDE 10; 50 MG/G; MG/G
GEL TOPICAL DAILY
Qty: 45 G | Refills: 11 | Status: SHIPPED | OUTPATIENT
Start: 2024-09-30

## 2024-09-30 NOTE — LETTER
9/30/2024      Prakash Prasad  [address] [address]  [place] [address]      Dear Colleague,    Thank you for referring your patient, Prakash Prasad, to the [facility]. Please see a copy of my visit note below.    [facility] Dermatology Note  Encounter Date: Sep 30, 2024  Store-and-Forward and Telephone ([id number]). Location of teledermatologist: [facility].  Start time: 12:33. End time: 12:38.    Dermatology Problem List:  1. Severe nodulocystic acne  - clindamycin/BPO 1.2%/5% gel  - in reserve: isotretinoin  - on testosterone supplementation for FTM   - prior: tretinoin 0.025%, 0.05%, doxycycline 100 mg BID, BPO 5% wash, clindamycin   2. Left upper arm: suspect lipoma       ____________________________________________    Assessment & Plan:     Acne vulgaris: inflammatory and comedonal. Patient would like to simplify regimen and avoid systemic treatments at this time. Will start combination gel.  - clindamycin/BPO 1.2%/5% gel  - consider addition of tretinoin in the future    Procedures Performed:    None    Follow-up: 6 months    Staff:     Mark Cleary MD, FAAD   of Dermatology  Department of Dermatology  [facility]    ____________________________________________    CC: Acne (Acne is okay but is getting acne by the nose, they appear cyst like. )    HPI:  Mr. Prakash Prasad is a(n) 34 year old adult who presents today as a return patient for acne vulgaris    Acne vulgaris - more active recently  - would like to avoid systemic antibiotics, OCPs, isotretinoin, spironolactone  - would like to simplify regimen    Patient is otherwise feeling well, without additional skin concerns.    Labs Reviewed:  N/A    Physical Exam:  Vitals: LMP  (LMP Unknown)   SKIN: Teledermatology photos were reviewed; image quality and interpretability: acceptable. Image date: 9/29/24.  - acneiform  papules on the face  - No other lesions of concern on areas examined.     Medications:  Current Outpatient Medications   Medication Sig Dispense Refill     ACE/ARB/ARNI NOT PRESCRIBED (INTENTIONAL) Please choose reason not prescribed from choices below.       acetaminophen (TYLENOL) 325 MG tablet Take 3 tablets (975 mg) by mouth every 6 hours. 100 tablet 0     amLODIPine (NORVASC) 5 MG tablet Take 1 tablet (5 mg) by mouth daily. 90 tablet 0     ARIPiprazole (ABILIFY) 10 MG tablet Take 1 tablet (10 mg) by mouth every morning. 90 tablet 0     blood glucose (NO BRAND SPECIFIED) test strip Use to test blood sugar one times daily and as needed. To accompany: Blood Glucose Monitor Brands: per insurance. 100 strip 3     cefdinir (OMNICEF) 300 MG capsule Take 1 capsule (300 mg) by mouth every 12 hours. First dose evening of 9/27 11 capsule 0     clonazePAM (KLONOPIN) 0.5 MG tablet Take 1 tablet (0.5 mg) by mouth 2 times daily as needed for anxiety. 30 tablet 0     cyclobenzaprine (FLEXERIL) 10 MG tablet Take 1 tablet (10 mg) by mouth every 8 hours as needed for muscle spasms. 30 tablet 0     empagliflozin (JARDIANCE) 10 MG TABS tablet Take 1 tablet (10 mg) by mouth daily 90 tablet 0     gabapentin (NEURONTIN) 600 MG tablet Take 2 tablets (1200 mg) morning and evening, 1 tablet mid-day (600 mg) 120 tablet 0     insulin pen needle (32G X 4 MM) 32G X 4 MM miscellaneous Use 1 pen needles daily or as directed. 100 each 2     lithium (ESKALITH CR/LITHOBID) 450 MG CR tablet Take 2 tablets (900 mg) by mouth at bedtime 60 tablet 0     ondansetron (ZOFRAN ODT) 4 MG ODT tab Take 1 tablet (4 mg) by mouth every 8 hours as needed for nausea. 30 tablet 0     oxyCODONE (ROXICODONE) 5 MG tablet Take 1-2 tablets (5-10 mg) by mouth every 6 hours as needed for severe pain. 8 tablet 0     paliperidone (INVEGA) 1.5 MG 24 hr tablet Take 3 tablets (4.5 mg) by mouth every morning. 180 tablet 0     PARoxetine (PAXIL) 20 MG tablet Take 1 tablet (20  mg) by mouth daily. 90 tablet 0     QUEtiapine (SEROQUEL) 200 MG tablet Take 1 tablet (200 mg) by mouth at bedtime (Patient taking differently: Take 200 mg by mouth at bedtime. With 50mg dose for a total of 250mg at bedtime) 30 tablet 2     QUEtiapine (SEROQUEL) 50 MG tablet Take 1 tablet (50 mg) by mouth at bedtime (Patient taking differently: Take 50 mg by mouth at bedtime. With 200mg dose for a total of 250mg at bedtime) 30 tablet 1     rosuvastatin (CRESTOR) 40 MG tablet Take 1 tablet (40 mg) by mouth daily 30 tablet 2     Semaglutide (RYBELSUS) 7 MG tablet Take 1 tablet (7 mg) by mouth daily. 90 tablet 0     testosterone (ANDROGEL/TESTIM) 50 MG/5GM (1%) topical gel Place 2 packets (100 mg of testosterone) onto the skin daily 30 packet 2     thin (NO BRAND SPECIFIED) lancets Use with lanceting device to check blood sugars once per day. To accompany: Blood Glucose Monitor Brands: per insurance. 100 each 2     traZODone (DESYREL) 50 MG tablet Take 1 tablet (50 mg) by mouth nightly as needed for sleep. 60 tablet 0     valACYclovir (VALTREX) 1000 mg tablet Take 2 tablets (2,000 mg) by mouth 2 times daily (Patient taking differently: Take 2,000 mg by mouth 2 times daily as needed.) 8 tablet 2     No current facility-administered medications for this visit.      Past Medical/Surgical History:   Patient Active Problem List   Diagnosis     ADHD (attention deficit hyperactivity disorder)     Bipolar 1 disorder, manic, mild     Marijuana abuse     Polysubstance abuse (H)     GERD (gastroesophageal reflux disease)     Tobacco abuse     Intractable back pain     Optic neuritis     Cauda equina syndrome with neurogenic bladder (H)     Schizoaffective disorder, bipolar type (H)     PTSD (post-traumatic stress disorder)     Anxiety     Auditory hallucination     Nephrolithiasis     Cyst of left ovary     Borderline personality disorder (H)     Cannabis use disorder, severe, dependence (H)     Depression     Episodic mood  disorder (H)     History of heroin abuse (H)     Moderate episode of recurrent major depressive disorder (H)     Opioid use disorder, severe, dependence (H)     Substance-induced psychotic disorder with hallucinations (H)     Nausea     Urinary retention     Chronic bilateral low back pain without sciatica     AVA (generalized anxiety disorder)     Aggressive behavior     Lumbosacral radiculopathy at L5     Abnormal uterine bleeding     Acanthosis nigricans     Schizoaffective disorder, chronic condition with acute exacerbation (H)     Bipolar affective disorder, mixed, severe, with psychotic behavior (H)     Schizophrenia, schizoaffective, chronic with acute exacerbation (H)     Akathisia     Hypertension, unspecified type     Female-to-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 person     Morbid obesity (H)     Mood disorder due to a general medical condition     Pain of right hand     Acne vulgaris     Type 2 diabetes mellitus with hyperglycemia, with long-term current use of insulin (H)     Herpes labialis     Major depressive disorder, recurrent severe without psychotic features (H)     Flank pain     Past Medical History:   Diagnosis Date     ADHD (attention deficit hyperactivity disorder)      Bipolar 1 disorder      Borderline personality disorder      Cauda equina syndrome      Chronic low back pain      Depression      Diabetes mellitus, type 2 (H) 1/19/2023     GERD (gastroesophageal reflux disease)      h/o TBI (traumatic brain injury)      Hypertension, unspecified type 12/16/2021     Marginal corneal ulcer, left 07/17/2015     Nephrolithiasis      obesity      Polysubstance abuse - methamphetamine, hallucinagen, heroin, marijuana     currently in remission     PONV (postoperative nausea and vomiting)      PTSD (post-traumatic stress disorder)        CC Mark Cleary MD  [address] [place] [address] on close of this encounter.      Again, thank you for allowing me to participate in the care of your patient.         Sincerely,        Mark Cleary MD

## 2024-09-30 NOTE — NURSING NOTE
Prakash Prasad's goals for this visit include:   Chief Complaint   Patient presents with    Acne     Acne is okay but is getting acne by the nose, they appear cyst like.        He requests these members of his care team be copied on today's visit information:     PCP: Becki Avrey    Referring Provider:  Mark Cleary MD  [address] [place] [id number]    LMP  (LMP Unknown)     Do you need any medication refills at today's visit?     Tamiko Irvin LPN on 9/30/2024 at 12:16 pm    Teledermatology Nurse Call Patients:     Are you in the [facility] at the time of the encounter? yes    Today's visit will be billed to you and your insurance.    A teledermatology visit is not as thorough as an in-person visit and the quality of the photograph sent may not be of the same quality as that taken by the dermatology clinic.      Tamiko Irvin LPN on 9/30/2024 at 12:19 PM

## 2024-09-30 NOTE — PROGRESS NOTES
[facility] Dermatology Note  Encounter Date: Sep 30, 2024  Store-and-Forward and Telephone ([id number]). Location of teledermatologist: [facility].  Start time: 12:33. End time: 12:38.    Dermatology Problem List:  1. Severe nodulocystic acne  - clindamycin/BPO 1.2%/5% gel  - in reserve: isotretinoin  - on testosterone supplementation for FTM   - prior: tretinoin 0.025%, 0.05%, doxycycline 100 mg BID, BPO 5% wash, clindamycin   2. Left upper arm: suspect lipoma       ____________________________________________    Assessment & Plan:     Acne vulgaris: inflammatory and comedonal. Patient would like to simplify regimen and avoid systemic treatments at this time. Will start combination gel.  - clindamycin/BPO 1.2%/5% gel  - consider addition of tretinoin in the future    Procedures Performed:    None    Follow-up: 6 months    Staff:     Mark Cleary MD, FAAD   of Dermatology  Department of Dermatology  [facility]    ____________________________________________    CC: Acne (Acne is okay but is getting acne by the nose, they appear cyst like. )    HPI:  Mr. Prakash Prasad is a(n) 34 year old adult who presents today as a return patient for acne vulgaris    Acne vulgaris - more active recently  - would like to avoid systemic antibiotics, OCPs, isotretinoin, spironolactone  - would like to simplify regimen    Patient is otherwise feeling well, without additional skin concerns.    Labs Reviewed:  N/A    Physical Exam:  Vitals: LMP  (LMP Unknown)   SKIN: Teledermatology photos were reviewed; image quality and interpretability: acceptable. Image date: 9/29/24.  - acneiform papules on the face  - No other lesions of concern on areas examined.     Medications:  Current Outpatient Medications   Medication Sig Dispense Refill    ACE/ARB/ARNI NOT PRESCRIBED (INTENTIONAL) Please choose reason not prescribed from choices below.       acetaminophen (TYLENOL) 325 MG tablet Take 3 tablets (975 mg) by mouth every 6 hours. 100 tablet 0    amLODIPine (NORVASC) 5 MG tablet Take 1 tablet (5 mg) by mouth daily. 90 tablet 0    ARIPiprazole (ABILIFY) 10 MG tablet Take 1 tablet (10 mg) by mouth every morning. 90 tablet 0    blood glucose (NO BRAND SPECIFIED) test strip Use to test blood sugar one times daily and as needed. To accompany: Blood Glucose Monitor Brands: per insurance. 100 strip 3    cefdinir (OMNICEF) 300 MG capsule Take 1 capsule (300 mg) by mouth every 12 hours. First dose evening of 9/27 11 capsule 0    clonazePAM (KLONOPIN) 0.5 MG tablet Take 1 tablet (0.5 mg) by mouth 2 times daily as needed for anxiety. 30 tablet 0    cyclobenzaprine (FLEXERIL) 10 MG tablet Take 1 tablet (10 mg) by mouth every 8 hours as needed for muscle spasms. 30 tablet 0    empagliflozin (JARDIANCE) 10 MG TABS tablet Take 1 tablet (10 mg) by mouth daily 90 tablet 0    gabapentin (NEURONTIN) 600 MG tablet Take 2 tablets (1200 mg) morning and evening, 1 tablet mid-day (600 mg) 120 tablet 0    insulin pen needle (32G X 4 MM) 32G X 4 MM miscellaneous Use 1 pen needles daily or as directed. 100 each 2    lithium (ESKALITH CR/LITHOBID) 450 MG CR tablet Take 2 tablets (900 mg) by mouth at bedtime 60 tablet 0    ondansetron (ZOFRAN ODT) 4 MG ODT tab Take 1 tablet (4 mg) by mouth every 8 hours as needed for nausea. 30 tablet 0    oxyCODONE (ROXICODONE) 5 MG tablet Take 1-2 tablets (5-10 mg) by mouth every 6 hours as needed for severe pain. 8 tablet 0    paliperidone (INVEGA) 1.5 MG 24 hr tablet Take 3 tablets (4.5 mg) by mouth every morning. 180 tablet 0    PARoxetine (PAXIL) 20 MG tablet Take 1 tablet (20 mg) by mouth daily. 90 tablet 0    QUEtiapine (SEROQUEL) 200 MG tablet Take 1 tablet (200 mg) by mouth at bedtime (Patient taking differently: Take 200 mg by mouth at bedtime. With 50mg dose for a total of 250mg at bedtime) 30 tablet 2    QUEtiapine (SEROQUEL) 50 MG  tablet Take 1 tablet (50 mg) by mouth at bedtime (Patient taking differently: Take 50 mg by mouth at bedtime. With 200mg dose for a total of 250mg at bedtime) 30 tablet 1    rosuvastatin (CRESTOR) 40 MG tablet Take 1 tablet (40 mg) by mouth daily 30 tablet 2    Semaglutide (RYBELSUS) 7 MG tablet Take 1 tablet (7 mg) by mouth daily. 90 tablet 0    testosterone (ANDROGEL/TESTIM) 50 MG/5GM (1%) topical gel Place 2 packets (100 mg of testosterone) onto the skin daily 30 packet 2    thin (NO BRAND SPECIFIED) lancets Use with lanceting device to check blood sugars once per day. To accompany: Blood Glucose Monitor Brands: per insurance. 100 each 2    traZODone (DESYREL) 50 MG tablet Take 1 tablet (50 mg) by mouth nightly as needed for sleep. 60 tablet 0    valACYclovir (VALTREX) 1000 mg tablet Take 2 tablets (2,000 mg) by mouth 2 times daily (Patient taking differently: Take 2,000 mg by mouth 2 times daily as needed.) 8 tablet 2     No current facility-administered medications for this visit.      Past Medical/Surgical History:   Patient Active Problem List   Diagnosis    ADHD (attention deficit hyperactivity disorder)    Bipolar 1 disorder, manic, mild    Marijuana abuse    Polysubstance abuse (H)    GERD (gastroesophageal reflux disease)    Tobacco abuse    Intractable back pain    Optic neuritis    Cauda equina syndrome with neurogenic bladder (H)    Schizoaffective disorder, bipolar type (H)    PTSD (post-traumatic stress disorder)    Anxiety    Auditory hallucination    Nephrolithiasis    Cyst of left ovary    Borderline personality disorder (H)    Cannabis use disorder, severe, dependence (H)    Depression    Episodic mood disorder (H)    History of heroin abuse (H)    Moderate episode of recurrent major depressive disorder (H)    Opioid use disorder, severe, dependence (H)    Substance-induced psychotic disorder with hallucinations (H)    Nausea    Urinary retention    Chronic bilateral low back pain without  sciatica    AVA (generalized anxiety disorder)    Aggressive behavior    Lumbosacral radiculopathy at L5    Abnormal uterine bleeding    Acanthosis nigricans    Schizoaffective disorder, chronic condition with acute exacerbation (H)    Bipolar affective disorder, mixed, severe, with psychotic behavior (H)    Schizophrenia, schizoaffective, chronic with acute exacerbation (H)    Akathisia    Hypertension, unspecified type    Female-to-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 person    Morbid obesity (H)    Mood disorder due to a general medical condition    Pain of right hand    Acne vulgaris    Type 2 diabetes mellitus with hyperglycemia, with long-term current use of insulin (H)    Herpes labialis    Major depressive disorder, recurrent severe without psychotic features (H)    Flank pain     Past Medical History:   Diagnosis Date    ADHD (attention deficit hyperactivity disorder)     Bipolar 1 disorder     Borderline personality disorder     Cauda equina syndrome     Chronic low back pain     Depression     Diabetes mellitus, type 2 (H) 1/19/2023    GERD (gastroesophageal reflux disease)     h/o TBI (traumatic brain injury)     Hypertension, unspecified type 12/16/2021    Marginal corneal ulcer, left 07/17/2015    Nephrolithiasis     obesity     Polysubstance abuse - methamphetamine, hallucinagen, heroin, marijuana     currently in remission    PONV (postoperative nausea and vomiting)     PTSD (post-traumatic stress disorder)        CC Mark Cleary MD  [address] [place] [id number] on close of this encounter.

## 2024-10-01 ENCOUNTER — NURSE TRIAGE (OUTPATIENT)
Dept: FAMILY MEDICINE | Facility: CLINIC | Age: 34
End: 2024-10-01

## 2024-10-01 ENCOUNTER — TELEPHONE (OUTPATIENT)
Dept: FAMILY MEDICINE | Facility: CLINIC | Age: 34
End: 2024-10-01

## 2024-10-01 ENCOUNTER — PATIENT OUTREACH (OUTPATIENT)
Dept: CARE COORDINATION | Facility: CLINIC | Age: 34
End: 2024-10-01

## 2024-10-01 ASSESSMENT — ACTIVITIES OF DAILY LIVING (ADL): DEPENDENT_IADLS:: CLEANING;COOKING;LAUNDRY;MEAL PREPARATION

## 2024-10-01 NOTE — PROGRESS NOTES
Clinic Care Coordination Contact  Transitions of Care Outreach  Patient was transferred from inpatient psychiatry floor due to flank pain d/t nephrolithiasis.  Patient was initially at the hospital for SI, auditory hallucinations.  Seen by psychiatry who adjusted medication regimen.  It is noted that patient had non obstructive stones and urinary retention.  Patient has hx of neurogenic bowel and bladder.  Styles placed and treated with opiates for pain management.  Bladder pain not resolved, UA sent and E coli found.  Patient treated with Rocephin, then transitioned to Cefdinir for 7 days.  Styles removed and started on ABX for 2 days.  SW called patient, patient reported no concerns and that he is feeling better.  Patient reported he called and scheduled appt with PCO for tomorrow.  Patient has an appt with urology scheduled for 10/8/2024  Chief Complaint   Patient presents with    Clinic Care Coordination - Post [facility]     SW       Most Recent Admission Date: 9/24/2024   Most Recent Admission Diagnosis:      Most Recent Discharge Date: 9/27/2024   Most Recent Discharge Diagnosis: Flank pain - R10.9  Hypertension, unspecified type - I10  Mood disorder (H) - F39  Schizoaffective disorder, chronic condition with acute exacerbation (H) - F25.9  Major depressive disorder, recurrent severe without psychotic features (H) - F33.2  Anxiety - F41.9  AVA (generalized anxiety disorder) - F41.1  Lumbosacral radiculopathy at L5 - M54.17  Complicated UTI (urinary tract infection) - N39.0  Persistent insomnia - G47.00     Transitions of Care Assessment    Discharge Assessment  How are you doing now that you are home?: much better  How are your symptoms? (Red Flag symptoms escalate to triage hotline per guidelines): Improved  Do you know how to contact your clinic care team if you have future questions or changes to your health status? : Yes  Does the patient have their discharge instructions? : Yes  Does the patient have  questions regarding their discharge instructions? : No  Were you started on any new medications or were there changes to any of your previous medications? : Yes  Does the patient have all of their medications?: Yes  Do you have questions regarding any of your medications? : No  Do you have all of your needed medical supplies or equipment (DME)?  (i.e. oxygen tank, CPAP, cane, etc.): Yes    Post-op ([facility]A Only)  If the patient had a surgery or procedure, do they have any questions for a nurse?: No    Post-op (Clinicians Only)  Did the patient have surgery or a procedure: No  Fever: No  Chills: No  Eating & Drinking: eating and drinking without complaints/concerns  PO Intake: regular diet  Additional Symptoms:  (N/A)  Bowel Function: normal  Date of last BM: 09/30/24  Urinary Status: voiding without complaint/concerns      Follow up Plan     Discharge Follow-Up  Discharge follow up appointment scheduled in alignment with recommended follow up timeframe or Transitions of Risk Category? (Low = within 30 days; Moderate= within 14 days; High= within 7 days): Yes  Discharge Follow Up Appointment Date: 10/02/24  Discharge Follow Up Appointment Scheduled with?: Primary Care Provider    Future Appointments   Date Time Provider Department Center   10/2/2024  1:30 PM Becki Avery APRN CNP [facility]   10/8/2024  3:00 PM Oscar Sanchez APRN CNP [facility]       Outpatient Plan as outlined on AVS reviewed with patient.    For any urgent concerns, please contact our 24 hour nurse triage line: [phone] ([phone])       RAYMUNDO Augustin, MSW   [facility]  Care Coordination  [facility]  [phone]  10/1/2024 11:45 AM

## 2024-10-01 NOTE — TELEPHONE ENCOUNTER
Pt was discharged from hospital 9/27/24 for flank pain due to nephrolithiasis:        Adult [facility] Follow-up and recommended labs and tests     A referral was placed for you to follow up with a primary care doctor - they should reach out to you to schedule. You already have an appointment set up with urology for follow up on your urinary issues. You should additionally stay in touch with your  and outpatient mental health team as several of your psychiatric meds were changed during your stay.      Appointments on [place] and/or [facility] (with [facility] or [facility] provider or service). Call [phone] if you haven't heard regarding these appointments within 7 days of discharge.     RN called to triage and review hospital discharge. Pt stated symptoms are consistent with hospital stay and currently resting at home now.       Transitions of Care Outreach  No chief complaint on file.      Most Recent Admission Date: 9/24/2024   Most Recent Admission Diagnosis:      Most Recent Discharge Date: 9/27/2024   Most Recent Discharge Diagnosis: Flank pain - R10.9  Hypertension, unspecified type - I10  Mood disorder (H) - F39  Schizoaffective disorder, chronic condition with acute exacerbation (H) - F25.9  Major depressive disorder, recurrent severe without psychotic features (H) - F33.2  Anxiety - F41.9  AVA (generalized anxiety disorder) - F41.1  Lumbosacral radiculopathy at L5 - M54.17  Complicated UTI (urinary tract infection) - N39.0  Persistent insomnia - G47.00     Transitions of Care Assessment    Discharge Assessment  How are you doing now that you are home?: doing the same  How are your symptoms? (Red Flag symptoms escalate to triage hotline per guidelines): Unchanged  Do you know how to contact your clinic care team if you have future questions or changes to your health status? : Yes  Does the patient have their discharge instructions? : Yes  Does the patient have questions regarding their discharge  instructions? : No  Were you started on any new medications or were there changes to any of your previous medications? : Yes  Does the patient have all of their medications?: Yes  Do you have questions regarding any of your medications? : No  Do you have all of your needed medical supplies or equipment (DME)?  (i.e. oxygen tank, CPAP, cane, etc.): Yes    Follow up Plan     Discharge Follow-Up  Discharge follow up appointment scheduled in alignment with recommended follow up timeframe or Transitions of Risk Category? (Low = within 30 days; Moderate= within 14 days; High= within 7 days): Yes  Discharge Follow Up Appointment Date: 10/02/24  Discharge Follow Up Appointment Scheduled with?: Primary Care Provider    Future Appointments   Date Time Provider Department Center   10/2/2024  1:30 PM Becki Avery APRN CNP [facility]   10/8/2024  3:00 PM Oscar Sanchez APRN CNP [facility]       Outpatient Plan as outlined on AVS reviewed with patient.    For any urgent concerns, please contact our 24 hour nurse triage line: [phone] ([phone])       Jie Walters RN

## 2024-10-01 NOTE — TELEPHONE ENCOUNTER
Nurse Triage SBAR    Is this a 2nd Level Triage? NO    Situation: Pt c/o left ankle injury.     Background: pt was feeling dizziness while walking, pt reports dizziness is not new since hospital discharge.     Assessment: Left ankle Swelling is improving (top of foot near left side ankle). Pain 3/10, tylenol used for pain. Limping but able to walk. Pt has also tried ice and elevation with improvement of symptoms.    Protocol Recommended Disposition:   See in Office Today or Tomorrow: Pt is scheduled with pcp for hospital follow up 10/2/24. RN recommended Ortho walk in, pt declined and would like to wait for pcp advice at 10/2/24 appointment. Pt to call if he experiences worsening symptoms or new symptoms.     Jie Walters RN on 10/1/2024 at 11:16 AM     Reason for Disposition   Limp when walking    Additional Information   Negative: Major bleeding (actively dripping or spurting) that can't be stopped   Negative: Amputation or bone sticking through the skin   Negative: Looks like a dislocated joint (crooked or deformed)   Negative: Serious injury with multiple fractures (broken bones)   Negative: Sounds like a life-threatening emergency to the triager   Negative: Wound looks infected   Negative: Caused by an animal bite   Negative: Puncture wound of foot   Negative: Toe injury is main symptom   Negative: Cast problems or questions   Negative: Bullet, stabbed by knife or other serious penetrating wound   Negative: Can't stand (bear weight) or walk (e.g., 4 steps)   Negative: Skin is split open or gaping (length > 1/2 inch or 12 mm)   Negative: Bleeding won't stop after 10 minutes of direct pressure (using correct technique)   Negative: Dirt in the wound and not removed after 15 minutes of scrubbing   Negative: Numbness (new loss of sensation) of toe(s)   Negative: Looks infected (e.g., spreading redness, red streak, pus)   Negative: Sounds like a serious injury to the triager   Negative: SEVERE pain (e.g.,  "excruciating)   Negative: A 'snap' or 'pop' was heard at the time of injury   Negative: Large swelling or bruise and size > palm of person's hand   Negative: No prior tetanus shots (or is not fully vaccinated) and any wound (e.g., cut or scrape)   Negative: HIV positive or severe immunodeficiency (severely weak immune system) and DIRTY cut   Negative: Patient wants to be seen   Negative: MODERATE pain (e.g., interferes with normal activities, limping) and high-risk adult (e.g., age > 60 years, osteoporosis, chronic steroid use)    Answer Assessment - Initial Assessment Questions  1. MECHANISM: \"How did the injury happen?\" (e.g., twisting injury, direct blow)       Walking down the stairs   2. ONSET: \"When did the injury happen?\" (Minutes or hours ago)       9/30/24  3. LOCATION: \"Where is the injury located?\"       Left ankle   4. APPEARANCE of INJURY: \"What does the injury look like?\"       Swelling  5. WEIGHT-BEARING: \"Can you put weight on that foot?\" \"Can you walk (four steps or more)?\"        Yes   6. SIZE: For cuts, bruises, or swelling, ask: \"How large is it?\" (e.g., inches or centimeters;  entire joint)       No redness, no bruising   7. PAIN: \"Is there pain?\" If Yes, ask: \"How bad is the pain?\"    (e.g., Scale 1-10; or mild, moderate, severe)    - NONE (0): no pain.    - MILD (1-3): doesn't interfere with normal activities.     - MODERATE (4-7): interferes with normal activities (e.g., work or school) or awakens from sleep, limping.     - SEVERE (8-10): excruciating pain, unable to do any normal activities, unable to walk.       3/10 pain  8. TETANUS: For any breaks in the skin, ask: \"When was the last tetanus booster?\"      no  9. OTHER SYMPTOMS: \"Do you have any other symptoms?\"       no    Protocols used: Ankle and Foot Injury-A-[place]    "

## 2024-10-01 NOTE — TELEPHONE ENCOUNTER
Patient calling and stating he would like to take a shower but he is not sure how to do this with a ferreira. He was under the impression he would disconnect the tubing. RN highly discouraged disconnecting the tubing due to concerns for infection. RN educated patient to be cautious when showering and to be sure not to pull catheter out and not to fall when showering. Encouraged patient to hang bag in comfortable place during shower.     He then asked about cleaning the bag with white vinegar as he was under the impression that he was told. RN highly encouraged patient to reach out to urology department for further questioning on how to clean bag.     Neeru Muro RN on 10/1/2024 at 2:41 PM

## 2024-10-02 ENCOUNTER — OFFICE VISIT (OUTPATIENT)
Dept: FAMILY MEDICINE | Facility: CLINIC | Age: 34
End: 2024-10-02
Payer: MEDICARE

## 2024-10-02 ENCOUNTER — ANCILLARY PROCEDURE (OUTPATIENT)
Dept: GENERAL RADIOLOGY | Facility: CLINIC | Age: 34
End: 2024-10-02
Attending: NURSE PRACTITIONER
Payer: MEDICARE

## 2024-10-02 VITALS
HEART RATE: 89 BPM | BODY MASS INDEX: 37.51 KG/M2 | RESPIRATION RATE: 18 BRPM | HEIGHT: 66 IN | OXYGEN SATURATION: 97 % | WEIGHT: 233.4 LBS | TEMPERATURE: 97.9 F

## 2024-10-02 DIAGNOSIS — N20.0 BILATERAL KIDNEY STONES: ICD-10-CM

## 2024-10-02 DIAGNOSIS — Z79.4 TYPE 2 DIABETES MELLITUS WITH HYPERGLYCEMIA, WITH LONG-TERM CURRENT USE OF INSULIN (H): ICD-10-CM

## 2024-10-02 DIAGNOSIS — N20.0 BILATERAL KIDNEY STONES: Primary | ICD-10-CM

## 2024-10-02 DIAGNOSIS — Z78.9 FEMALE-TO-MALE TRANSGENDER PERSON: ICD-10-CM

## 2024-10-02 DIAGNOSIS — F25.0 SCHIZOAFFECTIVE DISORDER, BIPOLAR TYPE (H): ICD-10-CM

## 2024-10-02 DIAGNOSIS — G83.4 CAUDA EQUINA SYNDROME WITH NEUROGENIC BLADDER (H): ICD-10-CM

## 2024-10-02 DIAGNOSIS — M25.572 PAIN IN JOINT, ANKLE AND FOOT, LEFT: ICD-10-CM

## 2024-10-02 DIAGNOSIS — E11.65 TYPE 2 DIABETES MELLITUS WITH HYPERGLYCEMIA, WITH LONG-TERM CURRENT USE OF INSULIN (H): ICD-10-CM

## 2024-10-02 PROBLEM — F25.9 SCHIZOPHRENIA, SCHIZOAFFECTIVE, CHRONIC WITH ACUTE EXACERBATION (H): Status: RESOLVED | Noted: 2021-11-29 | Resolved: 2024-10-02

## 2024-10-02 PROBLEM — F33.1 MODERATE EPISODE OF RECURRENT MAJOR DEPRESSIVE DISORDER (H): Status: RESOLVED | Noted: 2017-01-17 | Resolved: 2024-10-02

## 2024-10-02 PROBLEM — M79.641 PAIN OF RIGHT HAND: Status: RESOLVED | Noted: 2023-11-08 | Resolved: 2024-10-02

## 2024-10-02 PROBLEM — L83 ACANTHOSIS NIGRICANS: Status: RESOLVED | Noted: 2021-07-21 | Resolved: 2024-10-02

## 2024-10-02 PROBLEM — F25.9 SCHIZOAFFECTIVE DISORDER, CHRONIC CONDITION WITH ACUTE EXACERBATION (H): Status: RESOLVED | Noted: 2021-11-29 | Resolved: 2024-10-02

## 2024-10-02 LAB
EST. AVERAGE GLUCOSE BLD GHB EST-MCNC: 166 MG/DL
HBA1C MFR BLD: 7.4 % (ref 0–5.6)

## 2024-10-02 PROCEDURE — 99496 TRANSJ CARE MGMT HIGH F2F 7D: CPT | Performed by: NURSE PRACTITIONER

## 2024-10-02 PROCEDURE — 73610 X-RAY EXAM OF ANKLE: CPT | Mod: TC | Performed by: RADIOLOGY

## 2024-10-02 PROCEDURE — 74018 RADEX ABDOMEN 1 VIEW: CPT | Mod: TC | Performed by: RADIOLOGY

## 2024-10-02 PROCEDURE — 36415 COLL VENOUS BLD VENIPUNCTURE: CPT | Performed by: NURSE PRACTITIONER

## 2024-10-02 PROCEDURE — 73630 X-RAY EXAM OF FOOT: CPT | Mod: TC | Performed by: RADIOLOGY

## 2024-10-02 PROCEDURE — 83036 HEMOGLOBIN GLYCOSYLATED A1C: CPT | Performed by: NURSE PRACTITIONER

## 2024-10-02 RX ORDER — OXYCODONE HYDROCHLORIDE 5 MG/1
5 TABLET ORAL EVERY 6 HOURS PRN
Qty: 8 TABLET | Refills: 0 | Status: ON HOLD | OUTPATIENT
Start: 2024-10-02 | End: 2024-10-22

## 2024-10-02 RX ORDER — OXYCODONE HYDROCHLORIDE 5 MG/1
5 TABLET ORAL EVERY 6 HOURS PRN
Qty: 12 TABLET | Refills: 0 | Status: ON HOLD | OUTPATIENT
Start: 2024-10-04 | End: 2024-10-22

## 2024-10-02 NOTE — PROGRESS NOTES
"  Assessment & Plan     Bilateral kidney stones  Will recheck KUB here today.  Keep follow-up appointment with urology.  Due to previous history of opiate use disorder will send 2-day prescription of oxycodone to pharmacy and a 3-day prescription with a future fill date of oxycodone to pharmacy.  - XR KUB; Future  - oxyCODONE (ROXICODONE) 5 MG tablet; Take 1 tablet (5 mg) by mouth every 6 hours as needed for pain.  - naloxone (NARCAN) 4 MG/0.1ML nasal spray; Spray 1 spray (4 mg) into one nostril alternating nostrils as needed for opioid reversal. every 2-3 minutes until assistance arrives  - oxyCODONE (ROXICODONE) 5 MG tablet; Take 1 tablet (5 mg) by mouth every 6 hours as needed for pain.    Pain in joint, ankle and foot, left  X-ray reviewed and negative per my read  - XR Foot Left G/E 3 Views; Future  - XR Ankle Left G/E 3 Views; Future    Type 2 diabetes mellitus with hyperglycemia, with long-term current use of insulin (H)  Will recheck A1c here today.  - Hemoglobin A1c; Future  - Hemoglobin A1c    Cauda equina syndrome with neurogenic bladder (H)  Continue with Styles.  Follow-up with urology.    Schizoaffective disorder, bipolar type (H)  Recent hospitalization notes reviewed.  Continue to follow closely with psychiatry    Female-to-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 person  Strongly desires top surgery.      Xray - Reviewed and interpreted by me.  Left foot and ankle x-ray negative per my read.        MED REC REQUIRED  Post Medication Reconciliation Status: discharge medications reconciled and changed, per note/orders  BMI  Estimated body mass index is 37.69 kg/m  as calculated from the following:    Height as of this encounter: 1.676 m (5' 5.98\").    Weight as of this encounter: 105.9 kg (233 lb 6.4 oz).       Davin Randall is a 34 year old, presenting for the following health issues:  Hospital F/U        10/2/2024     1:03 PM   Additional Questions   Roomed by Emily JOHNS         10/2/2024     1:03 PM   Patient Reported " Additional Medications   Patient reports taking the following new medications None per patient     HPI        Hospital Follow-up Visit:    Hospital/Nursing Home/IP Rehab Facility: [facility]  Date of Admission: 9/24/24  Date of Discharge: 9/27/24  Reason(s) for Admission: flank pain   Was the patient in the ICU or did the patient experience delirium during hospitalization?  No  Do you have any other stressors you would like to discuss with your provider? Health Concerns    Problems taking medications regularly:  None  Medication changes since discharge: None  Problems adhering to non-medication therapy:  None    Summary of hospitalization:  [facility] discharge summary reviewed  Diagnostic Tests/Treatments reviewed.  Follow up needed: urology   Other Healthcare Providers Involved in Patient s Care:         Specialist appointment - 10/8  Update since discharge: stable.         Plan of care communicated with patient             Here today for hospital follow-up.  Was originally hospitalized and patient psychiatric unit due to to suicidal ideation.  Was admitted from September 13 to September 23.  On September 23rd was transferred to medical unit due to flank pain and nephrolithiasis.  Noted to have nonobstructive stones bilaterally.  And significant urinary retention.  Has history of neurogenic bowel and bladder after having cauda equina.  Reports has episodes where has severe bladder spasms and is not able to completely empty bladder when is having spasms.  Will have episodes of this.  Currently has Styles catheter.  Has appointment scheduled on October 8 for follow-up with urologist for consideration of suprapubic catheter or conduit.  Has 2 days left of antibiotic.  Has been taking it routinely.  No fevers or chills.  Nauseated but is at baseline.  Is having significant flank pain.  Does not think has passed stones yet.  Is having flank pain bilaterally.   "Is feeling very dizzy.  Is trying to push fluids.    On Monday was going to open door to let friend in.  Fell down steps.  Twisted left ankle.  Is having pain to top of left foot and also outside of left ankle.  Was very swollen.  Feels very stiff.  Difficult to put weight on foot.  No numbness or tingling.  Has not noticed any bruising.          Objective    Pulse 89   Temp 97.9  F (36.6  C) (Temporal)   Resp 18   Ht 1.676 m (5' 5.98\")   Wt 105.9 kg (233 lb 6.4 oz)   LMP  (LMP Unknown)   SpO2 97%   BMI 37.69 kg/m    Body mass index is 37.69 kg/m .  Physical Exam  Constitutional:       Appearance: He is well-developed.   HENT:      Right Ear: Tympanic membrane and external ear normal.      Left Ear: Tympanic membrane and external ear normal.      Mouth/Throat:      Mouth: Mucous membranes are moist.      Pharynx: Uvula midline.   Neck:      Thyroid: No thyromegaly.      Vascular: No carotid bruit.   Cardiovascular:      Rate and Rhythm: Normal rate and regular rhythm.      Heart sounds: Normal heart sounds.   Pulmonary:      Effort: Pulmonary effort is normal.      Breath sounds: Normal breath sounds.   Abdominal:      General: Bowel sounds are normal.      Palpations: Abdomen is soft.      Tenderness: There is right CVA tenderness and left CVA tenderness.   Musculoskeletal:      Right lower leg: No edema.      Left lower leg: No edema.   Skin:     General: Skin is warm and dry.   Neurological:      Mental Status: He is alert.   Psychiatric:         Mood and Affect: Mood normal.              Signed Electronically by: BROOKLYN Cruz CNP    "

## 2024-10-08 ENCOUNTER — TELEPHONE (OUTPATIENT)
Dept: FAMILY MEDICINE | Facility: CLINIC | Age: 34
End: 2024-10-08

## 2024-10-08 DIAGNOSIS — F41.9 ANXIETY: ICD-10-CM

## 2024-10-08 RX ORDER — CLONAZEPAM 0.5 MG/1
0.5 TABLET ORAL 2 TIMES DAILY PRN
Status: CANCELLED | OUTPATIENT
Start: 2024-10-08

## 2024-10-08 NOTE — TELEPHONE ENCOUNTER
Needs to have medications managed by psychiatry office.  Previous psychiatrist should have someone that covers for them until he can get into see a new psychiatrist.  Benzodiazepine medications will not be prescribed by this office.    Becki CARVALHOC

## 2024-10-08 NOTE — TELEPHONE ENCOUNTER
Patient ran out of anxiety meds and does not see his new psych provider until the end of month. He is scheduled for 10/29/24 with new provider.     He is wondering if PCP can provide a refill for clonazePAM (KLONOPIN) 0.5 MG tablet. He verified that he takes 1 tablet (0.5 mg) by mouth 2 times daily as needed for anxiety. His preferred pharmacy is [place], [place] - [address]     Notified him that PCP is out of office today. He is requesting to have message addressed today. He is out of medication. Forwarding to PCP and covering provider pool. Please advise.    Lelia Jennings RN

## 2024-10-12 ENCOUNTER — HOSPITAL ENCOUNTER (INPATIENT)
Facility: CLINIC | Age: 34
LOS: 5 days | Discharge: HOME | DRG: 918 | End: 2024-10-22
Attending: EMERGENCY MEDICINE | Admitting: INTERNAL MEDICINE
Payer: MEDICARE

## 2024-10-12 DIAGNOSIS — F41.1 GAD (GENERALIZED ANXIETY DISORDER): ICD-10-CM

## 2024-10-12 DIAGNOSIS — T50.904A DRUG OVERDOSE OF UNDETERMINED INTENT, INITIAL ENCOUNTER: ICD-10-CM

## 2024-10-12 DIAGNOSIS — S62.346A CLOSED NONDISPLACED FRACTURE OF BASE OF FIFTH METACARPAL BONE OF RIGHT HAND, INITIAL ENCOUNTER: ICD-10-CM

## 2024-10-12 DIAGNOSIS — R19.7 DIARRHEA, UNSPECIFIED TYPE: ICD-10-CM

## 2024-10-12 DIAGNOSIS — R45.851 SUICIDAL IDEATION: ICD-10-CM

## 2024-10-12 DIAGNOSIS — F25.0 SCHIZOAFFECTIVE DISORDER, BIPOLAR TYPE (H): Primary | Chronic | ICD-10-CM

## 2024-10-12 DIAGNOSIS — B00.1 HERPES LABIALIS: ICD-10-CM

## 2024-10-12 DIAGNOSIS — A04.72 C. DIFFICILE COLITIS: ICD-10-CM

## 2024-10-12 DIAGNOSIS — R10.9 FLANK PAIN: ICD-10-CM

## 2024-10-12 DIAGNOSIS — F33.2 MAJOR DEPRESSIVE DISORDER, RECURRENT SEVERE WITHOUT PSYCHOTIC FEATURES (H): ICD-10-CM

## 2024-10-12 DIAGNOSIS — F41.9 ANXIETY: ICD-10-CM

## 2024-10-12 DIAGNOSIS — F39 MOOD DISORDER (H): ICD-10-CM

## 2024-10-12 LAB
ALBUMIN SERPL BCG-MCNC: 5 G/DL (ref 3.5–5.2)
ALP SERPL-CCNC: 88 U/L (ref 40–150)
ALT SERPL W P-5'-P-CCNC: 52 U/L (ref 0–70)
ANION GAP SERPL CALCULATED.3IONS-SCNC: 15 MMOL/L (ref 7–15)
APAP SERPL-MCNC: <5 UG/ML (ref 10–30)
AST SERPL W P-5'-P-CCNC: 52 U/L (ref 0–45)
BASOPHILS # BLD AUTO: 0.1 10E3/UL (ref 0–0.2)
BASOPHILS NFR BLD AUTO: 0 %
BILIRUB SERPL-MCNC: 0.3 MG/DL
BUN SERPL-MCNC: 11.1 MG/DL (ref 6–20)
CALCIUM SERPL-MCNC: 9.9 MG/DL (ref 8.8–10.4)
CHLORIDE SERPL-SCNC: 100 MMOL/L (ref 98–107)
CREAT SERPL-MCNC: 0.76 MG/DL (ref 0.51–1.17)
EGFRCR SERPLBLD CKD-EPI 2021: >90 ML/MIN/1.73M2
EOSINOPHIL # BLD AUTO: 0 10E3/UL (ref 0–0.7)
EOSINOPHIL NFR BLD AUTO: 0 %
ERYTHROCYTE [DISTWIDTH] IN BLOOD BY AUTOMATED COUNT: 14.1 % (ref 10–15)
ETHANOL SERPL-MCNC: <0.01 G/DL
GLUCOSE SERPL-MCNC: 141 MG/DL (ref 70–99)
HCO3 SERPL-SCNC: 22 MMOL/L (ref 22–29)
HCT VFR BLD AUTO: 47.6 % (ref 35–53)
HGB BLD-MCNC: 15.9 G/DL (ref 11.7–17.7)
HOLD SPECIMEN: NORMAL
IMM GRANULOCYTES # BLD: 0.1 10E3/UL
IMM GRANULOCYTES NFR BLD: 0 %
INR PPP: 1.01 (ref 0.85–1.15)
LYMPHOCYTES # BLD AUTO: 4.3 10E3/UL (ref 0.8–5.3)
LYMPHOCYTES NFR BLD AUTO: 30 %
MCH RBC QN AUTO: 27.9 PG (ref 26.5–33)
MCHC RBC AUTO-ENTMCNC: 33.4 G/DL (ref 31.5–36.5)
MCV RBC AUTO: 84 FL (ref 78–100)
MONOCYTES # BLD AUTO: 0.6 10E3/UL (ref 0–1.3)
MONOCYTES NFR BLD AUTO: 4 %
NEUTROPHILS # BLD AUTO: 9.5 10E3/UL (ref 1.6–8.3)
NEUTROPHILS NFR BLD AUTO: 65 %
NRBC # BLD AUTO: 0 10E3/UL
NRBC BLD AUTO-RTO: 0 /100
PLATELET # BLD AUTO: 295 10E3/UL (ref 150–450)
POTASSIUM SERPL-SCNC: 3.8 MMOL/L (ref 3.4–5.3)
PROT SERPL-MCNC: 8.3 G/DL (ref 6.4–8.3)
RBC # BLD AUTO: 5.7 10E6/UL (ref 3.8–5.9)
SALICYLATES SERPL-MCNC: <0.3 MG/DL
SODIUM SERPL-SCNC: 137 MMOL/L (ref 135–145)
WBC # BLD AUTO: 14.6 10E3/UL (ref 4–11)

## 2024-10-12 PROCEDURE — 80307 DRUG TEST PRSMV CHEM ANLYZR: CPT | Performed by: EMERGENCY MEDICINE

## 2024-10-12 PROCEDURE — 82077 ASSAY SPEC XCP UR&BREATH IA: CPT | Performed by: EMERGENCY MEDICINE

## 2024-10-12 PROCEDURE — 85025 COMPLETE CBC W/AUTO DIFF WBC: CPT | Performed by: EMERGENCY MEDICINE

## 2024-10-12 PROCEDURE — 80143 DRUG ASSAY ACETAMINOPHEN: CPT | Performed by: EMERGENCY MEDICINE

## 2024-10-12 PROCEDURE — 85610 PROTHROMBIN TIME: CPT | Performed by: EMERGENCY MEDICINE

## 2024-10-12 PROCEDURE — 93005 ELECTROCARDIOGRAM TRACING: CPT

## 2024-10-12 PROCEDURE — 36415 COLL VENOUS BLD VENIPUNCTURE: CPT | Performed by: EMERGENCY MEDICINE

## 2024-10-12 PROCEDURE — 81001 URINALYSIS AUTO W/SCOPE: CPT | Performed by: EMERGENCY MEDICINE

## 2024-10-12 PROCEDURE — 80179 DRUG ASSAY SALICYLATE: CPT | Performed by: EMERGENCY MEDICINE

## 2024-10-12 PROCEDURE — 87086 URINE CULTURE/COLONY COUNT: CPT | Performed by: EMERGENCY MEDICINE

## 2024-10-12 PROCEDURE — 99285 EMERGENCY DEPT VISIT HI MDM: CPT | Mod: 25

## 2024-10-12 PROCEDURE — 80053 COMPREHEN METABOLIC PANEL: CPT | Performed by: EMERGENCY MEDICINE

## 2024-10-12 ASSESSMENT — COLUMBIA-SUICIDE SEVERITY RATING SCALE - C-SSRS
5. HAVE YOU STARTED TO WORK OUT OR WORKED OUT THE DETAILS OF HOW TO KILL YOURSELF? DO YOU INTEND TO CARRY OUT THIS PLAN?: NO
4. HAVE YOU HAD THESE THOUGHTS AND HAD SOME INTENTION OF ACTING ON THEM?: NO
3. HAVE YOU BEEN THINKING ABOUT HOW YOU MIGHT KILL YOURSELF?: NO
1. IN THE PAST MONTH, HAVE YOU WISHED YOU WERE DEAD OR WISHED YOU COULD GO TO SLEEP AND NOT WAKE UP?: YES
6. HAVE YOU EVER DONE ANYTHING, STARTED TO DO ANYTHING, OR PREPARED TO DO ANYTHING TO END YOUR LIFE?: YES
2. HAVE YOU ACTUALLY HAD ANY THOUGHTS OF KILLING YOURSELF IN THE PAST MONTH?: YES

## 2024-10-12 ASSESSMENT — ACTIVITIES OF DAILY LIVING (ADL)
ADLS_ACUITY_SCORE: 39

## 2024-10-12 NOTE — ED TRIAGE NOTES
Triage Assessment       Row Name 08/10/23 6284       Triage Assessment (Adult)    Airway WDL WDL       Respiratory WDL    Respiratory WDL WDL       Skin Circulation/Temperature WDL    Skin Circulation/Temperature WDL WDL       Cardiac WDL    Cardiac WDL WDL       Peripheral/Neurovascular WDL    Peripheral Neurovascular WDL WDL       Cognitive/Neuro/Behavioral WDL    Cognitive/Neuro/Behavioral WDL WDL                     Universal Safety Interventions

## 2024-10-13 ENCOUNTER — TELEPHONE (OUTPATIENT)
Dept: BEHAVIORAL HEALTH | Facility: CLINIC | Age: 34
End: 2024-10-13
Payer: MEDICARE

## 2024-10-13 PROBLEM — F25.0 SCHIZOAFFECTIVE DISORDER, BIPOLAR TYPE (H): Chronic | Status: ACTIVE | Noted: 2017-06-30

## 2024-10-13 PROBLEM — E11.65 TYPE 2 DIABETES MELLITUS WITH HYPERGLYCEMIA, WITH LONG-TERM CURRENT USE OF INSULIN (H): Chronic | Status: ACTIVE | Noted: 2024-05-15

## 2024-10-13 PROBLEM — Z79.4 TYPE 2 DIABETES MELLITUS WITH HYPERGLYCEMIA, WITH LONG-TERM CURRENT USE OF INSULIN (H): Chronic | Status: ACTIVE | Noted: 2024-05-15

## 2024-10-13 PROBLEM — F39 MOOD DISORDER (H): Status: ACTIVE | Noted: 2024-10-13

## 2024-10-13 LAB
ALBUMIN UR-MCNC: NEGATIVE MG/DL
AMPHETAMINES UR QL SCN: ABNORMAL
APPEARANCE UR: CLEAR
BARBITURATES UR QL SCN: ABNORMAL
BENZODIAZ UR QL SCN: ABNORMAL
BILIRUB UR QL STRIP: NEGATIVE
BZE UR QL SCN: ABNORMAL
CANNABINOIDS UR QL SCN: ABNORMAL
COLOR UR AUTO: ABNORMAL
FENTANYL UR QL: ABNORMAL
GLUCOSE UR STRIP-MCNC: >=1000 MG/DL
HGB UR QL STRIP: NEGATIVE
HYALINE CASTS: 1 /LPF
KETONES UR STRIP-MCNC: ABNORMAL MG/DL
LEUKOCYTE ESTERASE UR QL STRIP: ABNORMAL
MUCOUS THREADS #/AREA URNS LPF: PRESENT /LPF
NITRATE UR QL: NEGATIVE
OPIATES UR QL SCN: ABNORMAL
PCP QUAL URINE (ROCHE): ABNORMAL
PH UR STRIP: 6 [PH] (ref 5–7)
RBC URINE: 10 /HPF
SP GR UR STRIP: 1.03 (ref 1–1.03)
SQUAMOUS EPITHELIAL: 3 /HPF
TRANSITIONAL EPI: 3 /HPF
UROBILINOGEN UR STRIP-MCNC: NORMAL MG/DL
WBC URINE: 42 /HPF

## 2024-10-13 PROCEDURE — 250N000011 HC RX IP 250 OP 636: Performed by: EMERGENCY MEDICINE

## 2024-10-13 PROCEDURE — 96372 THER/PROPH/DIAG INJ SC/IM: CPT | Performed by: EMERGENCY MEDICINE

## 2024-10-13 PROCEDURE — 250N000011 HC RX IP 250 OP 636: Mod: JZ | Performed by: EMERGENCY MEDICINE

## 2024-10-13 PROCEDURE — 96375 TX/PRO/DX INJ NEW DRUG ADDON: CPT

## 2024-10-13 PROCEDURE — 96376 TX/PRO/DX INJ SAME DRUG ADON: CPT

## 2024-10-13 PROCEDURE — 96365 THER/PROPH/DIAG IV INF INIT: CPT

## 2024-10-13 PROCEDURE — 93005 ELECTROCARDIOGRAM TRACING: CPT | Mod: 76

## 2024-10-13 PROCEDURE — 250N000013 HC RX MED GY IP 250 OP 250 PS 637: Performed by: EMERGENCY MEDICINE

## 2024-10-13 PROCEDURE — 96366 THER/PROPH/DIAG IV INF ADDON: CPT

## 2024-10-13 RX ORDER — ONDANSETRON 4 MG/1
4 TABLET, ORALLY DISINTEGRATING ORAL ONCE
Status: COMPLETED | OUTPATIENT
Start: 2024-10-13 | End: 2024-10-13

## 2024-10-13 RX ORDER — PALIPERIDONE 3 MG/1
3 TABLET, EXTENDED RELEASE ORAL EVERY MORNING
Status: ON HOLD | COMMUNITY
End: 2024-10-22

## 2024-10-13 RX ORDER — OLANZAPINE 10 MG/2ML
10 INJECTION, POWDER, FOR SOLUTION INTRAMUSCULAR 2 TIMES DAILY PRN
Status: DISCONTINUED | OUTPATIENT
Start: 2024-10-13 | End: 2024-10-22 | Stop reason: HOSPADM

## 2024-10-13 RX ORDER — ACETAMINOPHEN 325 MG/1
325-650 TABLET ORAL EVERY 6 HOURS PRN
COMMUNITY
End: 2024-11-13

## 2024-10-13 RX ORDER — LORAZEPAM 2 MG/ML
2 INJECTION INTRAMUSCULAR ONCE
Status: DISCONTINUED | OUTPATIENT
Start: 2024-10-13 | End: 2024-10-13

## 2024-10-13 RX ORDER — CLONAZEPAM 0.5 MG/1
0.5 TABLET ORAL 2 TIMES DAILY PRN
Status: DISCONTINUED | OUTPATIENT
Start: 2024-10-13 | End: 2024-10-22 | Stop reason: HOSPADM

## 2024-10-13 RX ORDER — LORAZEPAM 2 MG/ML
0.5 INJECTION INTRAMUSCULAR ONCE
Status: COMPLETED | OUTPATIENT
Start: 2024-10-13 | End: 2024-10-13

## 2024-10-13 RX ORDER — PALIPERIDONE 3 MG/1
3 TABLET, EXTENDED RELEASE ORAL EVERY MORNING
Status: DISCONTINUED | OUTPATIENT
Start: 2024-10-13 | End: 2024-10-22 | Stop reason: HOSPADM

## 2024-10-13 RX ORDER — ROSUVASTATIN CALCIUM 20 MG/1
40 TABLET, COATED ORAL DAILY
Status: DISCONTINUED | OUTPATIENT
Start: 2024-10-13 | End: 2024-10-22 | Stop reason: HOSPADM

## 2024-10-13 RX ORDER — OLANZAPINE 10 MG/2ML
INJECTION, POWDER, FOR SOLUTION INTRAMUSCULAR
Status: DISCONTINUED
Start: 2024-10-13 | End: 2024-10-13 | Stop reason: HOSPADM

## 2024-10-13 RX ORDER — ARIPIPRAZOLE 10 MG/1
10 TABLET ORAL EVERY MORNING
Status: DISCONTINUED | OUTPATIENT
Start: 2024-10-13 | End: 2024-10-22 | Stop reason: HOSPADM

## 2024-10-13 RX ORDER — TESTOSTERONE GEL, 1% 10 MG/G
100 GEL TRANSDERMAL DAILY
Status: DISCONTINUED | OUTPATIENT
Start: 2024-10-13 | End: 2024-10-22 | Stop reason: HOSPADM

## 2024-10-13 RX ORDER — ONDANSETRON 2 MG/ML
4 INJECTION INTRAMUSCULAR; INTRAVENOUS ONCE
Status: COMPLETED | OUTPATIENT
Start: 2024-10-13 | End: 2024-10-13

## 2024-10-13 RX ORDER — OLANZAPINE 10 MG/2ML
10 INJECTION, POWDER, FOR SOLUTION INTRAMUSCULAR ONCE
Status: DISCONTINUED | OUTPATIENT
Start: 2024-10-13 | End: 2024-10-19

## 2024-10-13 RX ORDER — DROPERIDOL 2.5 MG/ML
2.5 INJECTION, SOLUTION INTRAMUSCULAR; INTRAVENOUS ONCE
Status: COMPLETED | OUTPATIENT
Start: 2024-10-13 | End: 2024-10-13

## 2024-10-13 RX ORDER — AMLODIPINE BESYLATE 5 MG/1
5 TABLET ORAL DAILY
Status: DISCONTINUED | OUTPATIENT
Start: 2024-10-13 | End: 2024-10-16

## 2024-10-13 RX ORDER — ONDANSETRON 4 MG/1
4 TABLET, ORALLY DISINTEGRATING ORAL EVERY 8 HOURS PRN
Status: DISCONTINUED | OUTPATIENT
Start: 2024-10-13 | End: 2024-10-16

## 2024-10-13 RX ORDER — QUETIAPINE FUMARATE 50 MG/1
200 TABLET, FILM COATED ORAL AT BEDTIME
Status: DISCONTINUED | OUTPATIENT
Start: 2024-10-13 | End: 2024-10-22 | Stop reason: HOSPADM

## 2024-10-13 RX ORDER — CEFTRIAXONE 1 G/1
1 INJECTION, POWDER, FOR SOLUTION INTRAMUSCULAR; INTRAVENOUS ONCE
Status: COMPLETED | OUTPATIENT
Start: 2024-10-13 | End: 2024-10-14

## 2024-10-13 RX ORDER — PALIPERIDONE 1.5 MG/1
1.5 TABLET, EXTENDED RELEASE ORAL EVERY MORNING
Status: ON HOLD | COMMUNITY
End: 2024-10-22

## 2024-10-13 RX ORDER — OLANZAPINE 10 MG/2ML
10 INJECTION, POWDER, FOR SOLUTION INTRAMUSCULAR ONCE
Status: COMPLETED | OUTPATIENT
Start: 2024-10-13 | End: 2024-10-13

## 2024-10-13 RX ORDER — PAROXETINE 20 MG/1
20 TABLET, FILM COATED ORAL DAILY
Status: DISCONTINUED | OUTPATIENT
Start: 2024-10-13 | End: 2024-10-22 | Stop reason: HOSPADM

## 2024-10-13 RX ORDER — LORAZEPAM 2 MG/ML
1 INJECTION INTRAMUSCULAR ONCE
Status: COMPLETED | OUTPATIENT
Start: 2024-10-13 | End: 2024-10-13

## 2024-10-13 RX ORDER — PALIPERIDONE 1.5 MG/1
1.5 TABLET, EXTENDED RELEASE ORAL EVERY MORNING
Status: DISCONTINUED | OUTPATIENT
Start: 2024-10-13 | End: 2024-10-22 | Stop reason: HOSPADM

## 2024-10-13 RX ORDER — CEPHALEXIN 500 MG/1
500 CAPSULE ORAL 2 TIMES DAILY
Status: DISCONTINUED | OUTPATIENT
Start: 2024-10-14 | End: 2024-10-16

## 2024-10-13 RX ORDER — ACETAMINOPHEN 325 MG/1
325-650 TABLET ORAL EVERY 6 HOURS PRN
Status: DISCONTINUED | OUTPATIENT
Start: 2024-10-13 | End: 2024-10-16

## 2024-10-13 RX ORDER — TRAZODONE HYDROCHLORIDE 50 MG/1
50 TABLET, FILM COATED ORAL
Status: DISCONTINUED | OUTPATIENT
Start: 2024-10-13 | End: 2024-10-13

## 2024-10-13 RX ORDER — HALOPERIDOL 5 MG/ML
10 INJECTION INTRAMUSCULAR
Status: COMPLETED | OUTPATIENT
Start: 2024-10-13 | End: 2024-10-13

## 2024-10-13 RX ORDER — GABAPENTIN 600 MG/1
1200 TABLET ORAL 2 TIMES DAILY
Status: DISCONTINUED | OUTPATIENT
Start: 2024-10-13 | End: 2024-10-16

## 2024-10-13 RX ORDER — GABAPENTIN 600 MG/1
600 TABLET ORAL DAILY
Status: DISCONTINUED | OUTPATIENT
Start: 2024-10-13 | End: 2024-10-16

## 2024-10-13 RX ORDER — CYCLOBENZAPRINE HCL 10 MG
10 TABLET ORAL EVERY 8 HOURS PRN
Status: DISCONTINUED | OUTPATIENT
Start: 2024-10-13 | End: 2024-10-22 | Stop reason: HOSPADM

## 2024-10-13 RX ADMIN — HALOPERIDOL LACTATE 10 MG: 5 INJECTION, SOLUTION INTRAMUSCULAR at 18:03

## 2024-10-13 RX ADMIN — GABAPENTIN 600 MG: 600 TABLET, FILM COATED ORAL at 17:57

## 2024-10-13 RX ADMIN — CEFTRIAXONE 1 G: 1 INJECTION, POWDER, FOR SOLUTION INTRAMUSCULAR; INTRAVENOUS at 22:16

## 2024-10-13 RX ADMIN — NICOTINE POLACRILEX 4 MG: 2 GUM, CHEWING ORAL at 05:29

## 2024-10-13 RX ADMIN — QUETIAPINE 200 MG: 200 TABLET, FILM COATED ORAL at 20:28

## 2024-10-13 RX ADMIN — NICOTINE POLACRILEX 4 MG: 2 GUM, CHEWING ORAL at 16:58

## 2024-10-13 RX ADMIN — CLONAZEPAM 0.5 MG: 0.5 TABLET ORAL at 17:57

## 2024-10-13 RX ADMIN — GABAPENTIN 1200 MG: 600 TABLET, FILM COATED ORAL at 20:28

## 2024-10-13 RX ADMIN — OLANZAPINE 10 MG: 10 INJECTION, POWDER, FOR SOLUTION INTRAMUSCULAR at 06:48

## 2024-10-13 RX ADMIN — NICOTINE POLACRILEX 4 MG: 2 GUM, CHEWING ORAL at 18:40

## 2024-10-13 RX ADMIN — CYCLOBENZAPRINE 10 MG: 10 TABLET, FILM COATED ORAL at 17:57

## 2024-10-13 RX ADMIN — NICOTINE POLACRILEX 4 MG: 2 GUM, CHEWING ORAL at 10:35

## 2024-10-13 RX ADMIN — LORAZEPAM 1 MG: 2 INJECTION INTRAMUSCULAR; INTRAVENOUS at 16:44

## 2024-10-13 RX ADMIN — HALOPERIDOL LACTATE 10 MG: 5 INJECTION, SOLUTION INTRAMUSCULAR at 09:26

## 2024-10-13 RX ADMIN — LORAZEPAM 0.5 MG: 2 INJECTION INTRAMUSCULAR; INTRAVENOUS at 20:26

## 2024-10-13 RX ADMIN — OLANZAPINE 10 MG: 10 INJECTION, POWDER, FOR SOLUTION INTRAMUSCULAR at 08:39

## 2024-10-13 ASSESSMENT — ACTIVITIES OF DAILY LIVING (ADL)
ADLS_ACUITY_SCORE: 39

## 2024-10-13 ASSESSMENT — COLUMBIA-SUICIDE SEVERITY RATING SCALE - C-SSRS
LETHALITY/MEDICAL DAMAGE CODE MOST LETHAL ACTUAL ATTEMPT: MODERATE PHYSICAL DAMAGE, MEDICAL ATTENTION NEEDED
2. HAVE YOU ACTUALLY HAD ANY THOUGHTS OF KILLING YOURSELF?: NO
ATTEMPT SINCE LAST CONTACT: NO
TOTAL  NUMBER OF ABORTED OR SELF INTERRUPTED ATTEMPTS SINCE LAST CONTACT: NO
1. SINCE LAST CONTACT, HAVE YOU WISHED YOU WERE DEAD OR WISHED YOU COULD GO TO SLEEP AND NOT WAKE UP?: NO
MOST LETHAL DATE: 66270
SUICIDE, SINCE LAST CONTACT: NO
6. HAVE YOU EVER DONE ANYTHING, STARTED TO DO ANYTHING, OR PREPARED TO DO ANYTHING TO END YOUR LIFE?: NO
TOTAL  NUMBER OF INTERRUPTED ATTEMPTS SINCE LAST CONTACT: NO

## 2024-10-13 NOTE — ED NOTES
"Pt found to be sitting on floor in room and had taken off monitoring and oxygen.  Pt awake but asking \"where am I and when can I go?\"  Pt reoriented to situation and place.  Writer explained that pt is on a hold and cannot leave because of the number of pills that the patient took earlier today.  Pt agrees to return to bed and is hooked up to the monitors.   "

## 2024-10-13 NOTE — ED TRIAGE NOTES
"Pt arrives via EMS after a drug overdose where he states he took 20 x 400 mg gabapentin and 10 clonapine pills a few hours ago.  Per EMS, pt lives alone and endorsed SI earlier in the day but denies this was a suicide attempt.  Per report, pt is female to 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 and has history of DM type 2.  during transport.     Upon arrival, pt arouses to voice, is confused, and can follow commands.  States that he took the pills \"because I wanted to feel relaxed\".  Placed on 4 L O2 via NC for sats of 88-91 % on RA.  Respirations are between 22-32 breaths/minute and shallow.  Oriented to self only.       Triage Assessment (Adult)       Row Name 10/12/24 2044          Triage Assessment    Airway WDL WDL        Respiratory WDL    Respiratory WDL X;rhythm/pattern     Rhythm/Pattern, Respiratory shallow  Pt denies SOB, respirations shallow        Skin Circulation/Temperature WDL    Skin Circulation/Temperature WDL WDL        Cardiac WDL    Cardiac WDL WDL     Cardiac Rhythm NSR        Cognitive/Neuro/Behavioral WDL    Cognitive/Neuro/Behavioral WDL X;arousability     Level of Consciousness lethargic     Arousal Level arouses to voice     Orientation disoriented to;time;situation;place     Speech slurred  slow and slightly slurred     Mood/Behavior withdrawn;hypoactive (quiet, withdrawn)                     "

## 2024-10-13 NOTE — ED NOTES
Pt continues to kick and flail against restraints intermittently, lastly for only 5-8 seconds. There is now a sitter outside of the pt room and writer asked security for second set of eyes to verify restraints were still on pt effectively and appropriately.

## 2024-10-13 NOTE — ED NOTES
DEC Consult Order placed. DEC assessment completed today by Nohemi Joaquin on at 0713.  Consult acknowledged and completed.     Nohemi Joaquin, BOBSW

## 2024-10-13 NOTE — PLAN OF CARE
Prakash Prasad  October 13, 2024  Plan of Care Hand-off Note     Patient Care Path: observation    Plan for Care:   Prakash was minimally engaged in assessment.  Prakash reported that he has been experiencing Chronic suicidal ideations for the last month.  He denied that taking medications this evening was a suicide attempt, stating that he took them because he wanted to relax.  Patient gave minimal responses to questions asked and minimized symptoms throughout the assessment. Patient lives alone.  Patient reported that he his currently under committement through [facility].    Identified Goals and Safety Issues: Patient will remain in ED overnight under observation for ingestion of medications.  Patient will be reassessed in the am to assess if they are more enaged in assessment,  safety planning.  Collateral contact not made during night.       Legal Status: Legal Status at Admission: Commitment    Extended Care Orders placed.     Updated   regarding plan of care.           ANGEL SAWYER, LICSW

## 2024-10-13 NOTE — ED NOTES
"FYI--  Patient Prakash is concerned about Dog being left alone at home.   Writer did LVM for emergency contact but unknown if they can assist with Dog.   Prakash says there \"may be help\" to care for dog, but does not give specifics.       Prakash may be additionally upset/afraid due to care of dog.   Writer did consult re pet with attending RN.   " Farzana Jonesal was seen and treated in our emergency department on 3/7/2023. He may return to work on 03/08/2023. If you have any questions or concerns, please don't hesitate to call.       Gurpreet Baker, APRN - CNP

## 2024-10-13 NOTE — PLAN OF CARE
Prakash Prasad  October 13, 2024  Plan of Care Hand-off Note     Patient Care Path: inpatient mental health    Plan for Care:   Last night, patient Prakash intentionally ingested a concerning amount of medication.  While it was reportedly Prakash who called EMS, Prakash has had recent and chronic SI.  Prakash is on a [facility] commitment, refusing to take prescribed meds and engaging with reportedly only certain mental health providers.  Prakash is upset that a friend has been telling unspecified providers that Prakash is not safe to live on own.  Prakash moved from a group home in August of this year.  Prakash has lost some of his contacts from his safety plan, updated only recently at an ED encounter.  Prakash was hospitalized twice last month.  Prakash additionally was unable to identify an alternative behavior to ingestion that brought him to the ED tonight. Prakash coded last night and had to be restrained.  He remains visibly irritable at this time, pacing continuously in ED patient room.   Prakash is recommended for [facility] for safety, stabilization and treatment.    Identified Goals and Safety Issues: Patient Prakash remained in ED overnight under observation for ingestion of medications.  Prakash was seen subsequently and recommended for [facility] for intentional ingestion, treatment of symptoms and stabilization.   Patient is capable of re-escalating; ED staff aware of safety issues in ED with patient.     Overview: Patient mother, Negra (see demograhics)      Legal Status: Legal Status at Admission: Commitment    Psychiatry Consult: NO    Updated regarding plan of care.  Attending RN and MD Nohemi Joaquin, [facility]

## 2024-10-13 NOTE — ED NOTES
"Pt sitting in bed awake, taking off BP cuff and oximeter and stating \"I want to leave\".  Writer reminded pt that he is on a hold and cannot leave because he had taken pills earlier today.  PT states \"they have worn off\".  After conversation and reorientation to situation, pt agreeable to continue monitoring.  Pt lays back in bed and resting with eyes closed.   "

## 2024-10-13 NOTE — ED NOTES
Pt was told by MD that he would be going inpatient d/t patient being on a commitment and not taking medications appropriately. Pt pacing around room still. Pt proceeded to punch the garage door in room 4 times. Code 21 called, pt placed in 5 point restraints and given 10mg IM zyprexa. 1:1 sitter at bedside. RN will continue to monitor pt.

## 2024-10-13 NOTE — ED PROVIDER NOTES
"  Emergency Department Note      History of Present Illness     Chief Complaint   Drug Overdose      HPI   Prakash Prasad is a 34 year old adult with history of Bipolar 1 disorder, ADHD, borderline personality disorder, type 2 diabetes mellitus, TBI, polysubstance abuse, and previous suicide attempt who presents to the ED via EMS for evaluation of a drug overdose. Nurse present reports Prakash took 20 400 mg gabapentin and 10 Klonopin about 2 hours ago. Patient stated this was not a suicide attempt, but he was endorsing suicidal ideation earlier. He took the pills tonight because he \"needed to relax\" and felt stressed. Prakash does not remember when he took the pills. He called 911 due to feeling lightheaded. . History of suicide attempt \"a long time ago.\" Denies alcohol use. Patient lives alone.          Independent Historian   Nurse present as detailed above.    Review of External Notes   none    Past Medical History     Medical History and Problem List   ADHD   Borderline personality disorder  Chronic low back pain  Type 2 diabetes mellitus   GERD   TBI  Hypertension  Nephrolithiasis  Obesity  Polysubstance abuse   PTSD   Marijuana abuse  Bipolar 1 disorder, manic  Tobacco abuse  Optic neuritis  Cauda equina syndrome with neurogenic bladder   Schizoaffective disorder  Auditory hallucination  Cyst of left ovary  Episodic mood disorder   Opioid use disorder, severe, dependence  Substance-induced psychotic disorder with hallucinations   AVA   Lumbosacral radiculopathy   Akathisia  Herpes labialis  Major depressive disorder  Adjustment disorder  Urolithiasis   Renal disease  Dextromethorphan overdose   Methamphetamine abuse   Leukocytosis     Medications   Amlodipine  Aripiprazole  Cefdinir  Clonazepam  Cyclobenzaprine  Empagliflozin  Gabapentin  Insulin  Lithium  Naloxone  Oxycodone  Paliperidone  Quetiapine  Rosuvastatin  Semaglutide  Testosterone  Trazadone  Valacyclovir   Paroxetine   Deutetrabenazine "   Ziprasidone  Lamotrigine     Surgical History   Colonoscopy  Combined cystoscopy, insert stent ureter(s), left  Combined cystoscopy, retrogrades, exchange stent ureter(s), left  Combined cystoscopy, retrogrades, ureteroscopy, insert stent, left x2  Cystoscopy, ureteroscopy, combined, right   ENT surgery  EGD, combined x3  Inject epidural transforaminal lumbar/sacral, left   Cholecystectomy  Laser holmium lithotripsy ureter(s), insert stent, combined, left   Transurethral stone resection   Tympanostomy   L5 S1 decompression    Physical Exam     Patient Vitals for the past 24 hrs:   BP Temp Temp src Pulse Resp SpO2 Weight   10/12/24 2232 (!) 142/91 -- -- 88 25 94 % --   10/12/24 2212 (!) 141/92 -- -- 91 -- 95 % --   10/12/24 2208 -- -- -- 89 (!) 32 95 % --   10/12/24 2200 (!) 142/89 -- -- 92 -- 94 % --   10/12/24 2149 -- -- -- 99 25 99 % --   10/12/24 2148 (!) 142/96 -- -- 100 -- 97 % --   10/12/24 2115 (!) 147/96 -- -- 93 29 95 % --   10/12/24 2101 -- -- -- 93 30 94 % --   10/12/24 2100 (!) 141/93 -- -- 91 (!) 34 94 % --   10/12/24 2045 (!) 134/94 -- -- 95 23 93 % --   10/12/24 2042 -- -- -- 98 (!) 0 (!) 88 % --   10/12/24 2039 (!) 158/100 98.5  F (36.9  C) Oral 99 26 92 % 103.8 kg (228 lb 12.8 oz)     Physical Exam  Constitutional:       Appearance: He is well-developed.      Comments: Lethargic appearing   HENT:      Right Ear: External ear normal.      Left Ear: External ear normal.      Mouth/Throat:      Mouth: Mucous membranes are moist.      Pharynx: Oropharynx is clear. No oropharyngeal exudate or posterior oropharyngeal erythema.   Eyes:      General: No scleral icterus.     Conjunctiva/sclera: Conjunctivae normal.      Pupils: Pupils are equal, round, and reactive to light.   Cardiovascular:      Rate and Rhythm: Normal rate and regular rhythm.      Heart sounds: Normal heart sounds. No murmur heard.     No friction rub. No gallop.   Pulmonary:      Effort: Pulmonary effort is normal. No respiratory  distress.      Breath sounds: Normal breath sounds. No wheezing or rales.   Abdominal:      General: Bowel sounds are normal. There is no distension.      Palpations: Abdomen is soft. There is no mass.      Tenderness: There is no abdominal tenderness.   Musculoskeletal:         General: Normal range of motion.   Skin:     General: Skin is warm and dry.      Capillary Refill: Capillary refill takes less than 2 seconds.      Findings: No rash.   Neurological:      General: No focal deficit present.      Cranial Nerves: No cranial nerve deficit.      Motor: No weakness.      Comments: Sleepy on exam, but wakes up and answers questions appropriately. Admits to  tonight but denies this was suicide attempt           Diagnostics     Lab Results   Labs Ordered and Resulted from Time of ED Arrival to Time of ED Departure   COMPREHENSIVE METABOLIC PANEL - Abnormal       Result Value    Sodium 137      Potassium 3.8      Carbon Dioxide (CO2) 22      Anion Gap 15      Urea Nitrogen 11.1      Creatinine 0.76      GFR Estimate >90      Calcium 9.9      Chloride 100      Glucose 141 (*)     Alkaline Phosphatase 88      AST 52 (*)     ALT 52      Protein Total 8.3      Albumin 5.0      Bilirubin Total 0.3     ACETAMINOPHEN LEVEL - Abnormal    Acetaminophen <5.0 (*)    CBC WITH PLATELETS AND DIFFERENTIAL - Abnormal    WBC Count 14.6 (*)     RBC Count 5.70      Hemoglobin 15.9      Hematocrit 47.6      MCV 84      MCH 27.9      MCHC 33.4      RDW 14.1      Platelet Count 295      % Neutrophils 65      % Lymphocytes 30      % Monocytes 4      % Eosinophils 0      % Basophils 0      % Immature Granulocytes 0      NRBCs per 100 WBC 0      Absolute Neutrophils 9.5 (*)     Absolute Lymphocytes 4.3      Absolute Monocytes 0.6      Absolute Eosinophils 0.0      Absolute Basophils 0.1      Absolute Immature Granulocytes 0.1      Absolute NRBCs 0.0     INR - Normal    INR 1.01     ETHYL ALCOHOL LEVEL - Normal    Alcohol ethyl <0.01      SALICYLATE LEVEL - Normal    Salicylate <0.3     ROUTINE UA WITH MICROSCOPIC REFLEX TO CULTURE       Imaging   No orders to display       EKG   ECG taken at 2036, ECG read at 2043  Sinus tachycardia  Otherwise normal ECG   Rate 101 bpm. UT interval 164 ms. QRS duration 102 ms. QT/QTc 372/482 ms. P-R-T axes 56 15 46.    Independent Interpretation   None    ED Course      Medications Administered   Medications - No data to display    Procedures   Procedures     Discussion of Management   Poison control    ED Course   ED Course as of 10/12/24 2241   Sat Oct 12, 2024   2051 I obtained history and examined the patient as noted above.    2054 HOA 2102 I spoke with Poison Control regarding the patient.        Additional Documentation  None    Medical Decision Making / Diagnosis     CMS Diagnoses: None    MIPS       None    MDM   Prakash Prasad is a 34 year old male who presents after ingesting the medication as described above.  Patient was quite lethargic at times but wakes up with sternal rub was able to answer questions.  Nurse had talked to poison control and they recommended monitoring until sobriety.  Patient is still asleep on my exam.  He is placed on hold pending further evaluation.  It is unclear whether this is a suicide attempt or not.  Patient tells me that he did have suicidal ideation earlier but was not intending to ingest the medication to hurt himself.  Patient will be monitored and placed on hold until we can clear all this up.  Dr. Baker to evaluate the patient.    Disposition   Care of the patient was transferred to my colleague Dr. Mays pending sobriety and DEC eval.     Diagnosis     ICD-10-CM    1. Drug overdose of undetermined intent, initial encounter  T50.904A                Scribe Disclosure:  I, Cyn Mckeon, am serving as a scribe at 8:56 PM on 10/12/2024 to document services personally performed by Juvencio Leyva MD based on my observations and the provider's statements to me.         Juvencio Leyva MD  10/12/24 1725

## 2024-10-13 NOTE — ED NOTES
RN ED Mental Health Handoff Note    72 hour hold    Does patient require 1:1? Yes    Hold and rights been given and documented for patient: Yes    Is the patient in BH scrubs? Yes    Has the patient been searched? Yes    Is the 15 minute observation tool up to date? Yes    Was patient issued a welcome folder? Yes    Room check completed this shift: Yes    PSS3 and Archuleta Assessment/Reassessment this shift:    C-SSRS ([place]uleta)      Date and Time Q1 Wished to be Dead (Past Month) Q2 Suicidal Thoughts (Past Month) Q3 Suicidal Thought Method Q4 Suicidal Intent without Specific Plan Q5 Suicide Intent with Specific Plan Q6 Suicide Behavior (Lifetime) If yes to Q6, within past 3 months? Level of Risk per Screen Level of Risk per Screen User   10/12/24 2356 -- -- -- -- -- 1-->yes -- -- -- PJB   10/12/24 2355 0-->no 0-->no -- -- -- -- -- -- no risks indicated PJB   10/12/24 2047 1-->yes 1-->yes 0-->no 0-->no 0-->no 1-->yes 0-->no -- moderate risk MS            Behavioral status of patient: Red. Multiple code 21's called on pt. See nursing notes.    Code 21 called this shift? Yes    Use of restraints/seclusion this shift? Yes. Details: see nursing notes.    Most recent vital signs:  Temp: 98.5  F (36.9  C) Temp src: Oral BP: (!) 147/100 Pulse: 95   Resp: 16 SpO2: 98 % O2 Device: None (Room air) Oxygen Delivery: 4 LPM    Medications:  Scheduled medication compliance? Yes    PRN Meds administered this shift? Yes    Medications   OLANZapine (zyPREXA) injection 10 mg ( Intramuscular Canceled Entry 10/13/24 0110)   nicotine (NICORETTE) gum 4 mg (4 mg Buccal $Given 10/13/24 9039)   OLANZapine (zyPREXA) injection 10 mg (has no administration in time range)   ondansetron (ZOFRAN) injection 4 mg (4 mg Intravenous Not Given 10/13/24 0305)   ondansetron (ZOFRAN ODT) ODT tab 4 mg (4 mg Oral Not Given 10/13/24 0305)   OLANZapine (zyPREXA) injection 10 mg (10 mg Intramuscular $Given 10/13/24 0650)         ADLs    Meal Provided this  shift? Yes    Hygiene items provided? Yes    ADLs completed? Yes    Date of last shower: PTA    Any significant events this shift? No    Any information that would be helpful in caring for this patient?  N/a    Family present/updated? No    Location of patient's belongings: DEC office    Critical Care Minutes:  Does the patient need critical care minutes documented? Yes

## 2024-10-13 NOTE — ED PROVIDER NOTES
Signed out at shift change.  In brief patient took extra medications and arrived somnolent and unsteady tonight.  Had suicidal ideation earlier in the day but stated the medication use was not suicidal but to sleep.  Is medically cleared but has not been appropriate yet for DEC evaluation due to ongoing somnolence and some confusion when awake.    12:35AM notified by [facility] that [facility] had called.  When I came to ask about the call I was told they would call back in 10-15 minutes.      I spoke with DEC  Latoya.  Patient not really cooperating with assessment.  Is on commitment, not taking medications, not going to appointments, not really describing why or engaging meaningfully with her.  Plan to reassess in the morning in case this is ongoing effects of ingestion.    Code 21 after nursing informed him of the need to stay for re-evaluation in the morning.  Currently pacing in the room, not willing to move back into the bed to move to a different room, restraints appropriate for safety, goal to discontinue as soon as clinically appropriate.  Zyprexa IM.    Was snoring after Zyprexa and restraints removed.  When awake started pacing back and forth in the room.  DEC re-evaluation ordered.    I spoke with  Nohemi.  Similar interaction as prior assessment and confirmed that he is not following his safety plan either.  Plan for inpatient admission.    When I told him, he was pacing and continued pacing.  Then hit the metal portion of the wall once.  However then started hitting it multiple times and code 21 was called again.  He would not tell me what medications he is supposed to be on currently.  Will need med rec today.     Marta Mays MD  10/13/24 3669

## 2024-10-13 NOTE — ED NOTES
"Pt picked up and threw keyboard in room. Pt threw keyboard towards computer in the room. Code 21 was called and at the time Callie RN was in the room along with the sitter. Writer was helping with another code 21 in another room (ED room 09). Writer asked pt if he would cooperatively go into the bed and he said \"No\". Then security and another RN were able to assist him into bed and the pt did not fight restraints. Pt was then transferred into ED room 08, pt began fighting restraints. Writer explained to pt that he would be getting IM Zyprexa to help him settle down and that with cooperative behavior we may be able to take him out of restraints. The pt responded \"You ain't giving me shit.\" At the same time the pt began kicking and flailing arms. Then when the writer explained to pt that we would be giving the medicaiton, he began to kick and flail again requiring 5 additional staff members to safely hold pt down, while in restraints.   "

## 2024-10-13 NOTE — PHARMACY-ADMISSION MEDICATION HISTORY
Pharmacist Admission Medication History    Admission medication history is complete. The information provided in this note is only as accurate as the sources available at the time of the update.    Information Source(s): Patient via in-person    Pertinent Information: None    Changes made to PTA medication list:  Added: None  Deleted: cefdinir, lithium, quetiapine 50mg, trazodone  Changed: Tylenol scheduled --> prn, paliperidone 3x1.5mg tabs --> 1,5mg + 3mg tabs    Allergies reviewed with patient and updates made in EHR: yes    Medication History Completed By: Tika Rodriguez RPH 10/13/2024 12:57 PM    PTA Med List   Medication Sig Last Dose    acetaminophen (TYLENOL) 325 MG tablet Take 325-650 mg by mouth every 6 hours as needed for mild pain. prn at prn    amLODIPine (NORVASC) 5 MG tablet Take 1 tablet (5 mg) by mouth daily. Past Month at am    ARIPiprazole (ABILIFY) 10 MG tablet Take 1 tablet (10 mg) by mouth every morning. Past Month at am    clindamycin phos-benzoyl perox (DUAC) 1.2-5 % external gel Apply topically daily. Past Month    clonazePAM (KLONOPIN) 0.5 MG tablet Take 1 tablet (0.5 mg) by mouth 2 times daily as needed for anxiety. 10/12/2024    cyclobenzaprine (FLEXERIL) 10 MG tablet Take 1 tablet (10 mg) by mouth every 8 hours as needed for muscle spasms. prn at prn    empagliflozin (JARDIANCE) 10 MG TABS tablet Take 1 tablet (10 mg) by mouth daily Past Month at am    gabapentin (NEURONTIN) 600 MG tablet Take 2 tablets (1200 mg) morning and evening, 1 tablet mid-day (600 mg) 10/12/2024    naloxone (NARCAN) 4 MG/0.1ML nasal spray Spray 1 spray (4 mg) into one nostril alternating nostrils as needed for opioid reversal. every 2-3 minutes until assistance arrives prn at prn    ondansetron (ZOFRAN ODT) 4 MG ODT tab Take 1 tablet (4 mg) by mouth every 8 hours as needed for nausea. prn at prn    oxyCODONE (ROXICODONE) 5 MG tablet Take 1-2 tablets (5-10 mg) by mouth every 6 hours as needed for severe pain. prn  at prn    paliperidone (INVEGA) 1.5 MG 24 hr tablet Take 1.5 mg by mouth every morning. Take with 3mg tablet for total dose of 4.5mg Past Month at am    paliperidone ER (INVEGA) 3 MG 24 hr tablet Take 3 mg by mouth every morning. Take with 1.5 mg tablet for total dose of 4.5mg Past Month at am    PARoxetine (PAXIL) 20 MG tablet Take 1 tablet (20 mg) by mouth daily. Past Month at am    QUEtiapine (SEROQUEL) 200 MG tablet Take 1 tablet (200 mg) by mouth at bedtime Past Month at hs    rosuvastatin (CRESTOR) 40 MG tablet Take 1 tablet (40 mg) by mouth daily Past Month at am    Semaglutide (RYBELSUS) 7 MG tablet Take 1 tablet (7 mg) by mouth daily. Past Month at am    testosterone (ANDROGEL/TESTIM) 50 MG/5GM (1%) topical gel Place 2 packets (100 mg of testosterone) onto the skin daily Past Month at am    valACYclovir (VALTREX) 1000 mg tablet Take 2 tablets (2,000 mg) by mouth 2 times daily (Patient taking differently: Take 2,000 mg by mouth 2 times daily as needed.) prn at prn

## 2024-10-13 NOTE — ED NOTES
Pt pacing in room. RN attempted to de-escalate pt. Pt still refusing meds. Pt refusing VS. Pt refusing all interventions.

## 2024-10-13 NOTE — PROGRESS NOTES
"Triage & Transition Services, Extended Care     Patient: Prakash goes by \"Prakash,\" uses he/him pronouns  Date of Service: October 13, 2024  Site of Service: [facility] EMERGENCY DEPT                             ED08  Patient was not seen  by Extended Care team today  Case management only      Case Management: Case Management Included: collaborating with patient's support system  Details on Collaborating with Patient's Support System:     1047 [phone].  greeting stated that  works on \"[employer],\" and that  normally works Tues-Fri.  stated to call [facility] if needed addittional assistance. Left  for  requesting call back to [phone].    1049 Called [facility].O[facility] who stated that 1) pt is enrolled member of [facility], 2) [facility] records show pt's  as \"Dandy\" with agency \"[employer].\" Appears [facility]'s records may be outdated re:case mgmnt. C[facility]E. recommends calling [facility] and asking for 's supervisor.     1102 Called [facility] Main Office [phone]: Greeting only gave [facility] crisis numbers.     1103 Called [facility] [phone]. Greeting only gave \"[facility]\" box with no further information.     See Care Teams. Updated today with information from 9/27/24 [facility] social work discharge note.       Plan for EC to call [phone] [facility] Main Office tomorrow to obtain phone number for supervisor/team for pt's case management and ask about current POC for pt and if team plans to revoke provisional discharge.    Current Plan of Care: inpatient mental health    Legal Status: Legal Status at Admission: 72 Hour Hold (and active [facility] commitment)  72 Hour Hold - Date/Time Initiated: 0120 Sun 10/13  72 Hour Hold - Date/Time Ends: 2359 Wed 10/16    CPT Code: CPT Codes: Non-Billable      Primary DiagnosesThis Admission:     *Mood disorder (H)      Aggressive " behavior      Borderline personality disorder (H)      ADHD (attention deficit hyperactivity disorder)        DANAE PADILLA, [facility], [facility]   Licensed Mental Health Professional (LMHP), [facility]  [phone]

## 2024-10-13 NOTE — PROGRESS NOTES
"Triage and Transition Services Extended Care Reassessment     Patient: Prakash goes by \"Prakash,\" uses he/him pronouns  Date of Service: October 13, 2024  Site of Service: [facility] EMERGENCY DEPT                             ED08  Patient was seen  10/13/2022  0528 hours  Mode of Assessment: virtual, amwell       Reason for Reassessment: other (see comment) (Patient was not seen as sufficiently cooperative at time of original assessment to make good dispostion decision)    History of Patient's Original Emergency Room Encounter: From original assessment; 10/12/2024 23xx hours:   Patient reported that he was not feeling well mentally or physically. Patient denied any triggers that happened. Patient reported that he took medications, he denied that it was an attempt to harm himself. Patient reported that he was trying to relax. Patient reported that they had been feeling dizzy and nauseous. Patient reported that they called the ambulance when they started to have physical symptoms. Patient stated, \"I wouldn't have called an ambulance if I wanted to die.\" Patient minimized symptoms throughout visit and gave minimal responses. Patient minimally engaged in safety planning and did not want collateral called, stating they will not know what happened. At the end of the assessment he started to open up about stressors that occurred today. He shared that he found out that someone, he considered a friend, was telling his staff that he felt that the patient was to at risk to be living on their own related to recent hospitalizations. He shared that he lived in a group home from 2021 until August of 2024 when he was able to move into his own apartment. He does not want to lose his ability to live in his own place. He reported that he has contact with [employer] ([employer] on a daily basis......Patient was hospitalized in September for suicidal ideations, self-injurious behavior. He reported that he is " "currently under committment through [facility]. He reported that he stopped taking his medications, except Klonopin a couple of weeks ago. He initailly reported that he was not participating in any of his mental health services. At the end of the visit he reported that he has continued to meet with his [facility] worker twice weekly and talks to his Committment  almost daily.    Current Patient Presentation: Patient Prakash presents as only mildly irritated at this time, answers are brief and some contradict those from last night.  Prakash is not taking medications.  While Prakash reports he is not engaging with MH professionals, he is in fact reportedly seeing [facility] worker twice weekly and speaks with commitment  \"daily\" from last night's assessment.  Prakash says he \"does not remember\" why he is on commitment through [facility].  He lives now in [place], alone in an apartment.  Prakash appears to have taken the pills out of dysregulation, perhaps anger, about a friend who has told unspecified providers that Prakash is not safe living alone.   Prakash reports moving out of group home in August of this year.    Presentation Summary: Writer sees patient Prakash several hours after initial assessment.  Prakash was minimally responsive in intial assessment. He was reasonably cooperative in reassessment, albeit, continuing to be brief, evasive and visibly irritable. Writer noted some inconsistencies in patient responses regarding, for example, engagement with therapist.  Prakash did say he is currently refusing medications.  He says he is not taking mental health medications and refusing to do so at present. He does not offer his reason for not wanting the medications for which he is prescribed.  While he had a couple of friends on his safety plan last completed in an ED encounter, he has lost those friends.  He is living alone in an apartment since leaving his group home in August.  Prakash coded " "last night.  He is less aggressive than reported earlier.  He has not been restrained for several hours now in this encounter.  However, Prakash was seen to be pacing, nonstop.   Patient reportedly ingested 20 400 mg Gabapentin and 10 Klonopin, \"to relax.\"   Prakash said he was \"at end of rope\" with stressors.  Addison will only tell  that \"it's a long story,\" regarding stressors and these have to do with \"networking and relationships.   Prakash continues to repeat that he \"just wants to go home.\"  Prakash does have a dog at home.  Patient's mother, Negra, says [facility] (community support) worker should be at home this morning. Prakash has PMH dx to include ADHD, BiPAD, BPD, MDD, Mood disorder and anxiety.  Prakash continues to deny the ingestion was a suicide attempt.  He cannot or will not indicate why he took as much of these medications as he did. Prakash reports daily cannabis use for relaxation.    Changes Observed Since Initial Assessment: provider request (writer does see current decrease in presenting symptoms but patient remains somewhat agitated and is a risk for escalation)    Therapeutic Interventions Provided: Explored motivation for ingestion.  Explored barriers to utilizing safety plan     Current Symptoms: racing thoughts, anxious avoidance, difficulty concentrating, withdrawl/isolation, negativistic, impaired decision making, irritable, thoughts of death/suicide anxious, excessive worry impulsive, displaces blame, agitation, distractability, high risk behavior      Mental Status Exam   Affect: Constricted  Appearance: Appropriate  Attention Span/Concentration: Attentive  Eye Contact: Variable    Fund of Knowledge: Appropriate   Language /Speech Content: Other (please comment) (variable)  Language /Speech Volume: Normal  Language /Speech Rate/Productions: Normal  Recent Memory: Variable  Remote Memory: Variable  Mood: Anxious, Irritable  Orientation to Person: Yes   Orientation to Place: " "Yes  Orientation to Time of Day: Yes  Orientation to Date: Yes     Situation (Do they understand why they are here?): Yes  Psychomotor Behavior: Normal  Thought Content: Clear  Thought Form: Intact    Treatment Objective(s) Addressed: rapport building, processing feelings, assessing safety    Patient Response to Interventions: verbalizes understanding, needs reinforcement    Progress Towards Goals:  Patient Reports Symptoms Are: ongoing (says nothing has changed but won't ingest again \"so I don't have to come here.\")  Patient Progress Toward Goals: is not making progress  Comment: pt would benefit from further stabilization, treatment and efforts to ensure patient safety  Next Step to Work Toward Discharge: means restriction  Means Restriction: Patient is being referred to [facility] for safety, stabilization and treatment    Case Management:  Has [facility] services     C-SSRS Since Last Contact:   1. Wish to be Dead (Since Last Contact): No  2. Non-Specific Active Suicidal Thoughts (Since Last Contact): No     Actual Attempt (Since Last Contact): No  Has subject engaged in non-suicidal self-injurious behavior? (Since Last Contact): No (Needed restraining in ED, however)  Interrupted Attempts (Since Last Contact): No  Aborted or Self-Interrupted Attempt (Since Last Contact): No  Preparatory Acts or Behavior (Since Last Contact): No  Suicide (Since Last Contact): No  Most Lethal Attempt Date: 06/10/22  Actual Lethality/Medical Damage Code (Most Lethal Attempt): Moderate physical damage, medical attention needed  Calculated C-SSRS Risk Score (Since Last Contact): No Risk Indicated    Plan: Final Disposition / Recommended Care Path: inpatient mental health  Plan for Care reviewed with assigned Medical Provider: yes  Plan for Care Team Review: provider, RN  Comments: Positive for cannabis  Patient and/or validated legal guardian concurs: no  Clinical Substantiation: Last night, patient Prakash intentionally ingested a " concerning amount of medication.  While it was reportedly Prakash who called EMS, Prakash has had recent and chronic SI.  Prakash is on a [facility] commitment, refusing to take prescribed meds and engaging with reportedly only certain mental health providers.  Prakash is upset that a friend has been telling unspecified providers that Prakash is not safe to live on own.  Prakash moved from a group home in August of this year.  Prakash has lost some of his contacts from his safety plan, updated only recently at an ED encounter.  Prakash was hospitalized twice last month.  Prakash additionally was unable to identify an alternative behavior to ingestion that brought him to the ED tonight. Prakash coded last night and had to be restrained.  He remains visibly irritable at this time, pacing continuously in ED patient room.   Prakash is recommended for IP for safety, stabilization and treatment.    Legal Status: Legal Status at Admission: Commitment    Session Status: Time session started: 0528  Time session ended: 0538  Session Duration (minutes): 10 minutes    Session Start Time: 0528  Session Stop Time: 0538  CPT codes: Non-Billable  Time Spent: 10 minutes      CPT code(s) utilized: Non-Billable    Diagnosis:   Patient Active Problem List   Diagnosis Code    ADHD (attention deficit hyperactivity disorder) F90.9    Bipolar 1 disorder, manic, mild F31.11    Marijuana abuse F12.10    Polysubstance abuse (H) F19.10    GERD (gastroesophageal reflux disease) K21.9    Tobacco abuse Z72.0    Intractable back pain M54.9    Optic neuritis H46.9    Cauda equina syndrome with neurogenic bladder (H) G83.4    Schizoaffective disorder, bipolar type (H) F25.0    PTSD (post-traumatic stress disorder) F43.10    Anxiety F41.9    Auditory hallucination R44.0    Nephrolithiasis N20.0    Cyst of left ovary N83.202    Borderline personality disorder (H) F60.3    Cannabis use disorder, severe, dependence (H) F12.20    Depression F32.A    Episodic  mood disorder (H) F39    History of heroin abuse (H) F11.11    Opioid use disorder, severe, dependence (H) F11.20    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    Nausea R11.0    Urinary retention R33.9    Chronic bilateral low back pain without sciatica M54.50, G89.29    AVA (generalized anxiety disorder) F41.1    Aggressive behavior R46.89    Lumbosacral radiculopathy at L5 M54.17    Abnormal uterine bleeding N93.9    Bipolar affective disorder, mixed, severe, with psychotic behavior (H) F31.64    Akathisia G25.71    Hypertension, unspecified type I10    Female-to-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 person Z78.9    Morbid obesity (H) E66.01    Mood disorder due to a general medical condition F06.30    Acne vulgaris L70.0    Type 2 diabetes mellitus with hyperglycemia, with long-term current use of insulin (H) E11.65, Z79.4    Herpes labialis B00.1    Major depressive disorder, recurrent severe without psychotic features (H) F33.2    Flank pain R10.9    Mood disorder (H) F39       Primary Problem This Admission: Active Hospital Problems    *Mood disorder (H)      Aggressive behavior      Borderline personality disorder (H)      ADHD (attention deficit hyperactivity disorder)        Nohemi Joaquin, [facility]   Licensed Mental Health Professional (LMHP), Extended Care (TM ON weekend staff)   [phone]

## 2024-10-13 NOTE — ED NOTES
Poison control called for update on pt condition.  Explained that pt is mostly sleeping with several periods of waking up with agitation and confusion and asking to leave.  Poison control said to continue to monitor until patient is on room air.  They will call back in the morning.

## 2024-10-13 NOTE — ED NOTES
RN called poison control and they stated that with the 20 tablets, dose 400 mg tablets of gabapentin and the 10 pills of clonapine that he will be sleepy. Poison control recommends checking a tylenol level. Peak of medications will be 4 hrs.

## 2024-10-13 NOTE — TELEPHONE ENCOUNTER
S: [facility] ED , DEC  Nohemi  calling at 6:43 AM about a 34 year old/Male presenting with intentional ingestion     B: Pt arrived via EMS. Presenting problem, stressors: Pt reporting relationship, mental health and concerns for safety. Pt fears they will lose their home d/t recent hospitalizations. Pt reported to be a poor historian.    Pt affect in ED: Constricted  Pt Dx: Borderline Personality Disorder, Disruptive Mood Dysregulation Disorder , and Aggressive Behavior Disorder.  Previous IPMH hx? Yes: most recent Sept 2024  Pt denies SI   Hx of suicide attempt? Yes: multiple SA.  Pt denies SIB  Pt denies HI   Pt denies hallucinations .   Pt RARS Score: 8    Hx of aggression/violence, sexual offenses, legal concerns, Epic care plan? describe: Yes, hx of physical aggression and epic care plan coordination note updated 1 year ago.  Current concerns for aggression this visit? Yes: Pt was restrained.  Does pt have a history of Civil Commitment? Yes, currently on [place] civil commitment-Unsure  Is Pt their own guardian? Yes    Pt is prescribed medication. Is patient medication compliant? No  Pt endorses OP services: Psychiatrist, Medication Management, Therapist, and [facility] Worker  CD concerns: Actively using/consuming Cannabis  Acute or chronic medical concerns: No  Does Pt present with specific needs, assistive devices, or exclusionary criteria? None      Pt is ambulatory  Pt is able to perform ADLs independently      A: Pt to be reviewed for [facility] admission. Pt is on a 72HH, initiated 10/13/24 01:20 AM  Preferred placement: Statewide    COVID Symptoms: No  If yes, COVID test required   Utox: Positive for Cannabinoids    CMP: Abnormalities: Glucose 141,AST 52   CBC: Abnormalities: Absolute Neutrophils 9.5  HCG: Not ordered, intake to request lab     R: Patient cleared and ready for behavioral bed placement: Yes  Pt placed on [facility] worklist? Yes    Does Patient need a Transfer Center request created? Yes, writer  completed Transfer Center request at:  6:57 AM    12:16 PM Kevin SPENCER will review for potential admission. Intake provided MRN, awaiting callback.  12:52 PM PT declined d/t acuity. WL updated.  2:07 PM Per Mekhi at [facility] can review Pt 24 hrs post restraints      : [place] [facility] Access Inpatient Bed Call Log 10/13/24 7:55 AM     Intake has called facilities that have not updated the bed status within the last 12 hours.    -[facility]                           [facility] is at capacity.            [facility] is posting 0 beds. [phone]    [facility] is posting 0 beds. Negative covid required.   [facility] is posting 0 beds. Neg covid. No high school/Deysi-psych. [phone]. Per call at 7:57am to Suy[facility] at capacity.   [facility] is posting 0 beds. [phone]   [facility] is posting 0 beds. [phone]    [facility] is posting 6 beds. Negative covid. Per follow up call at 8:41 am currently FULL.  [facility] ([facility]) is posting 1 beds [phone].       [facility] is posting 1 beds. LOW acuity. Ages 12+. Neg covid- [phone]   [facility] has 0 beds posted. No aggression. Negative Covid. Low acuity.   [facility] is posting 0 beds. Low acuity only. Neg covid.  [phone]    [facility] is posting 3 beds. Low acuity. No current aggression. Per call at 10:31 am at capacity until 10/14.  [facility] is posting 0 beds. Negative covid. [phone]    [facility] ([place]) is posting 0 beds available. Negative covid.  [phone].       [facility] is posting 0 beds. Low acuity. 72 HH hold preferred. Deysi unit. Negative covid required. [phone] Per call at 8:01am to Tessa reviewing very low acuity Pt's.   [facility] ([place]) is posting 0 beds. Low acuity only. Neg covid.  [phone]    [facility] in [place] is posting 0 beds.  Negative covid required.   Vol only,  No history of aggression, violence, or assault. No sexual offenders. No 72 HH holds. [phone]  PT NOT APPROPRIATE D/T [place] 72 HH.      [facility] is posting 3 beds. Negative covid required.  (Must have the cognitive ability to do programming. No aggressive or violent behavior or recent HX in the last 2 yrs. MH must be primary.) Always low acuity. [phone]  PT NOT APPROPRIATE D/T RECENT AGGRESSIVE BEHAVIOR AND RESTRAINTS.  [facility] has 0 beds posted. Negative covid required.  Low acuity only. Violence and aggression capped.  [phone] Per call at 8:02am to [facility] currently on diversion.   [facility] is posting 2 beds. Low acuity, Negative covid required. [phone] Per call at 8:04am to Jonna 1 available bed for review.   [facility] posting 1 bed. Negative covid required.  [phone]   [facility] [place] is posting 3 beds. Negative covid. LOW acuity. (No lines, drains, or tubes, oxygen, CPAP, IV, etc.) Must Have a Ride Home. [phone] Per call at 8:06 am to Intake 2 beds available.   [facility] is posting 0 beds. Negative covid. (No. lines, drains, or tubes, oxygen, CPAP, IV, etc.) [phone] Per call at 8:08am to  3 high acuity beds available for review. 10/31 [facility] to admit today [facility]t [facility] cancelled.  [facility] is posting 14 beds. No covid test required. [phone] Per call at 8:09am to Carlita no beds available can review referrals for appropriateness. PT NOT APPROPRIATE D/T [place] 72 HH.      Pt remains on the work list pending appropriate bed availability.

## 2024-10-13 NOTE — ED PROVIDER NOTES
I assumed care for this patient after signout from my colleague.  Plan at time of signout was to admit the patient to inpatient psychiatry.  He required an additional dose of Zyprexa which did not seem to help so he was given Haldol which seemed to improve his agitation.  He is taken out of restraints and remains cooperative.  Currently awaiting transfer to inpatient psychiatry at time of signout to my oncoming colleague.    Tank Durbin MD on 10/13/2024 at 3:07 PM       Tank Durbin MD  10/13/24 [id number]

## 2024-10-13 NOTE — TELEPHONE ENCOUNTER
S: [place]  Access Inpatient Bed Call Log 10/13/24  6:15 PM     Intake has called facilities that have not updated the bed status within the last 12 hours.                               Pt is on 72HH.  Statewide only.     [facility] is at capacity.            [facility] is posting 0 beds. [phone]    [facility] is posting 0 beds. Negative covid required.   [facility] is posting 0 beds. Neg covid. No high school/Deysi-psych. [phone]. .   [facility] is posting 0 beds. [phone]   [facility] is posting 0 beds. [phone]    [facility] is posting 2 beds. Negative covid.     [facility] ([facility]) is posting 1 beds [phone].       [facility] is posting 2 beds. LOW acuity. Ages 12+. Neg covid- [phone]   [facility] has 0 beds posted. No aggression. Negative Covid. Low acuity.   [facility] is posting 0 beds. Low acuity only. Neg covid.  [phone]    [facility] is posting 3 beds. Low acuity. No current aggression.     [facility] is posting 0 beds. Negative covid. [phone]    [facility] ([place] is posting 0 beds available. Negative covid.  [phone].       [facility] is posting 0 beds. Low acuity. 72 HH hold preferred. Deysi unit. Negative covid required. [phone].   [facility] ([place] [facility]) is posting 0 beds. Low acuity only. Neg covid.  [phone]    [facility] in [place] is posting 0 beds.  Negative covid required.   Vol only, No history of aggression, violence, or assault. No sexual offenders. No 72 HH holds. [phone]        [facility] is posting 3 beds. Negative covid required.  (Must have the cognitive ability to do programming. No aggressive or violent behavior or recent HX in the last 2 yrs. MH must be primary.) Always low acuity. [phone]    [facility] has 0 beds posted. Negative covid required.  Low  acuity only. Violence and aggression capped.  [phone].   [facility] is posting 2 beds. Low acuity, Negative covid required. [phone] [facility] posting 1 bed. Negative covid required.  [phone]   [facility] [place] is posting 2 beds. Negative covid. LOW acuity. (No lines, drains, or tubes, oxygen, CPAP, IV, etc.) Must Have a Ride Home. [phone].   [facility] is posting 0 beds. Negative covid. (No. lines, drains, or tubes, oxygen, CPAP, IV, etc.) [phone]         Pt remains on the work list pending appropriate bed availability.

## 2024-10-13 NOTE — ED NOTES
IP MH Referral Acuity Rating Score (RARS)    LMHP complete at referral to IP MH, with DEC; and, daily while awaiting IP MH placement. Call score to PPS.  CRITERIA SCORING   New 72 HH and Involuntary for IP MH (not adolescent) 1/1   Boarding over 24 hours 0/1   Vulnerable adult at least 55+ with multiple co morbidities; or, Patient age 11 or under 0/1   Suicide ideation without relief of precipitating factors 1/1   Current plan for suicide 0/1   Current plan for homicide 0/1   Imminent risk or actual attempt to seriously harm another without relief of factors precipitating the attempt 0/1   Severe dysfunction in daily living (ex: complete neglect for self care, extreme disruption in vegetative function, extreme deterioration in social interactions) 1/1   Recent (last 2 weeks) or current physical aggression in the ED 1/1   Restraints or seclusion episode in ED 1/1   Verbal aggression, agitation, yelling, etc., while in the ED 1/1   Active psychosis with psychomotor agitation or catatonia 0/1   Need for constant or near constant redirection (from leaving, from others, etc).  1/1   Intrusive or disruptive behaviors 1/1   TOTAL Acuity Total Score: 8

## 2024-10-13 NOTE — ED NOTES
Pt asking for ketamine   Offered pt other meds  Pt refusing  Pt states he refuses to take anything from anyone.   Pt says he would like to leave and states that if we do not let him leave he will need to be put back in restraints.  Pt started pounding on wall.   Security at bedside

## 2024-10-14 ENCOUNTER — TELEPHONE (OUTPATIENT)
Dept: BEHAVIORAL HEALTH | Facility: CLINIC | Age: 34
End: 2024-10-14
Payer: MEDICARE

## 2024-10-14 ENCOUNTER — MEDICAL CORRESPONDENCE (OUTPATIENT)
Dept: HEALTH INFORMATION MANAGEMENT | Facility: CLINIC | Age: 34
End: 2024-10-14

## 2024-10-14 ENCOUNTER — APPOINTMENT (OUTPATIENT)
Dept: GENERAL RADIOLOGY | Facility: CLINIC | Age: 34
DRG: 918 | End: 2024-10-14
Attending: STUDENT IN AN ORGANIZED HEALTH CARE EDUCATION/TRAINING PROGRAM
Payer: MEDICARE

## 2024-10-14 LAB
ATRIAL RATE - MUSE: 101 BPM
ATRIAL RATE - MUSE: 93 BPM
BACTERIA UR CULT: NORMAL
DIASTOLIC BLOOD PRESSURE - MUSE: NORMAL MMHG
DIASTOLIC BLOOD PRESSURE - MUSE: NORMAL MMHG
INTERPRETATION ECG - MUSE: NORMAL
INTERPRETATION ECG - MUSE: NORMAL
P AXIS - MUSE: 55 DEGREES
P AXIS - MUSE: 56 DEGREES
PR INTERVAL - MUSE: 164 MS
PR INTERVAL - MUSE: 170 MS
QRS DURATION - MUSE: 102 MS
QRS DURATION - MUSE: 98 MS
QT - MUSE: 372 MS
QT - MUSE: 384 MS
QTC - MUSE: 477 MS
QTC - MUSE: 482 MS
R AXIS - MUSE: 15 DEGREES
R AXIS - MUSE: 22 DEGREES
SYSTOLIC BLOOD PRESSURE - MUSE: NORMAL MMHG
SYSTOLIC BLOOD PRESSURE - MUSE: NORMAL MMHG
T AXIS - MUSE: 46 DEGREES
T AXIS - MUSE: 46 DEGREES
VENTRICULAR RATE- MUSE: 101 BPM
VENTRICULAR RATE- MUSE: 93 BPM

## 2024-10-14 PROCEDURE — 73130 X-RAY EXAM OF HAND: CPT | Mod: RT

## 2024-10-14 PROCEDURE — 250N000013 HC RX MED GY IP 250 OP 250 PS 637: Performed by: EMERGENCY MEDICINE

## 2024-10-14 PROCEDURE — 250N000013 HC RX MED GY IP 250 OP 250 PS 637: Performed by: STUDENT IN AN ORGANIZED HEALTH CARE EDUCATION/TRAINING PROGRAM

## 2024-10-14 PROCEDURE — 96372 THER/PROPH/DIAG INJ SC/IM: CPT | Performed by: EMERGENCY MEDICINE

## 2024-10-14 PROCEDURE — 96372 THER/PROPH/DIAG INJ SC/IM: CPT | Performed by: STUDENT IN AN ORGANIZED HEALTH CARE EDUCATION/TRAINING PROGRAM

## 2024-10-14 PROCEDURE — 26600 TREAT METACARPAL FRACTURE: CPT | Mod: RT

## 2024-10-14 PROCEDURE — 250N000011 HC RX IP 250 OP 636: Performed by: STUDENT IN AN ORGANIZED HEALTH CARE EDUCATION/TRAINING PROGRAM

## 2024-10-14 PROCEDURE — 99223 1ST HOSP IP/OBS HIGH 75: CPT

## 2024-10-14 PROCEDURE — 250N000011 HC RX IP 250 OP 636: Mod: JZ | Performed by: EMERGENCY MEDICINE

## 2024-10-14 RX ORDER — HYDROXYZINE HYDROCHLORIDE 25 MG/1
25 TABLET, FILM COATED ORAL 3 TIMES DAILY PRN
Status: DISCONTINUED | OUTPATIENT
Start: 2024-10-14 | End: 2024-10-22 | Stop reason: HOSPADM

## 2024-10-14 RX ORDER — DIPHENHYDRAMINE HYDROCHLORIDE 50 MG/ML
25 INJECTION INTRAMUSCULAR; INTRAVENOUS ONCE
Status: COMPLETED | OUTPATIENT
Start: 2024-10-14 | End: 2024-10-14

## 2024-10-14 RX ORDER — HALOPERIDOL 5 MG/ML
5 INJECTION INTRAMUSCULAR ONCE
Status: COMPLETED | OUTPATIENT
Start: 2024-10-14 | End: 2024-10-14

## 2024-10-14 RX ADMIN — CLONAZEPAM 0.5 MG: 0.5 TABLET ORAL at 11:24

## 2024-10-14 RX ADMIN — DIPHENHYDRAMINE HYDROCHLORIDE 25 MG: 50 INJECTION, SOLUTION INTRAMUSCULAR; INTRAVENOUS at 14:55

## 2024-10-14 RX ADMIN — NICOTINE POLACRILEX 4 MG: 2 GUM, CHEWING ORAL at 19:07

## 2024-10-14 RX ADMIN — OLANZAPINE 10 MG: 10 INJECTION, POWDER, FOR SOLUTION INTRAMUSCULAR at 11:56

## 2024-10-14 RX ADMIN — GABAPENTIN 1200 MG: 600 TABLET, FILM COATED ORAL at 20:31

## 2024-10-14 RX ADMIN — HYDROXYZINE HYDROCHLORIDE 25 MG: 25 TABLET ORAL at 18:02

## 2024-10-14 RX ADMIN — NICOTINE POLACRILEX 4 MG: 2 GUM, CHEWING ORAL at 15:46

## 2024-10-14 RX ADMIN — CEPHALEXIN 500 MG: 500 CAPSULE ORAL at 20:31

## 2024-10-14 RX ADMIN — NICOTINE POLACRILEX 4 MG: 2 GUM, CHEWING ORAL at 00:33

## 2024-10-14 RX ADMIN — DIPHENHYDRAMINE HYDROCHLORIDE 25 MG: 50 INJECTION, SOLUTION INTRAMUSCULAR; INTRAVENOUS at 19:02

## 2024-10-14 RX ADMIN — NICOTINE POLACRILEX 4 MG: 2 GUM, CHEWING ORAL at 20:25

## 2024-10-14 RX ADMIN — NICOTINE POLACRILEX 4 MG: 2 GUM, CHEWING ORAL at 10:52

## 2024-10-14 RX ADMIN — QUETIAPINE 200 MG: 200 TABLET, FILM COATED ORAL at 21:36

## 2024-10-14 RX ADMIN — HALOPERIDOL LACTATE 5 MG: 5 INJECTION, SOLUTION INTRAMUSCULAR at 14:55

## 2024-10-14 RX ADMIN — CEPHALEXIN 500 MG: 500 CAPSULE ORAL at 10:38

## 2024-10-14 RX ADMIN — HALOPERIDOL LACTATE 5 MG: 5 INJECTION, SOLUTION INTRAMUSCULAR at 19:04

## 2024-10-14 ASSESSMENT — ACTIVITIES OF DAILY LIVING (ADL)
ADLS_ACUITY_SCORE: 39

## 2024-10-14 ASSESSMENT — COLUMBIA-SUICIDE SEVERITY RATING SCALE - C-SSRS
5. HAVE YOU STARTED TO WORK OUT OR WORKED OUT THE DETAILS OF HOW TO KILL YOURSELF? DO YOU INTEND TO CARRY OUT THIS PLAN?: NO
TOTAL  NUMBER OF INTERRUPTED ATTEMPTS SINCE LAST CONTACT: NO
SUICIDE, SINCE LAST CONTACT: NO
ATTEMPT SINCE LAST CONTACT: NO
TOTAL  NUMBER OF ABORTED OR SELF INTERRUPTED ATTEMPTS SINCE LAST CONTACT: NO
1. SINCE LAST CONTACT, HAVE YOU WISHED YOU WERE DEAD OR WISHED YOU COULD GO TO SLEEP AND NOT WAKE UP?: YES
6. HAVE YOU EVER DONE ANYTHING, STARTED TO DO ANYTHING, OR PREPARED TO DO ANYTHING TO END YOUR LIFE?: NO
2. HAVE YOU ACTUALLY HAD ANY THOUGHTS OF KILLING YOURSELF?: YES

## 2024-10-14 NOTE — ED NOTES
RN ED Mental Health Handoff Note    72 hour hold    Does patient require 1:1? Yes    Hold and rights been given and documented for patient: Yes    Is the patient in BH scrubs? Yes    Has the patient been searched? Yes    Is the 15 minute observation tool up to date? Yes    Was patient issued a welcome folder? Yes    Room check completed this shift: Yes    PSS3 and [place] Assessment/Reassessment this shift:    C-SSRS ([place])      Date and Time Q1 Wished to be Dead (Past Month) Q2 Suicidal Thoughts (Past Month) Q3 Suicidal Thought Method Q4 Suicidal Intent without Specific Plan Q5 Suicide Intent with Specific Plan Q6 Suicide Behavior (Lifetime) If yes to Q6, within past 3 months? Level of Risk per Screen Level of Risk per Screen User   10/12/24 2356 -- -- -- -- -- 1-->yes -- -- -- PJB   10/12/24 2355 0-->no 0-->no -- -- -- -- -- -- no risks indicated PJB   10/12/24 2047 1-->yes 1-->yes 0-->no 0-->no 0-->no 1-->yes 0-->no -- moderate risk MS            Behavioral status of patient: Green    Code 21 called this shift? No    Use of restraints/seclusion this shift? No    Most recent vital signs:  Temp: 99  F (37.2  C)   BP: (!) 120/97 Pulse: 91   Resp: 16 SpO2: 96 % O2 Device: None (Room air)      Medications:  Scheduled medication compliance? Yes    PRN Meds administered this shift? Yes    Medications   OLANZapine (zyPREXA) injection 10 mg ( Intramuscular Canceled Entry 10/13/24 0110)   nicotine (NICORETTE) gum 4 mg (4 mg Buccal $Given 10/14/24 0033)   OLANZapine (zyPREXA) injection 10 mg (has no administration in time range)   acetaminophen (TYLENOL) tablet 325-650 mg (has no administration in time range)   amLODIPine (NORVASC) tablet 5 mg (5 mg Oral Not Given 10/13/24 1350)   ARIPiprazole (ABILIFY) tablet 10 mg (10 mg Oral Not Given 10/13/24 1351)   clonazePAM (klonoPIN) tablet 0.5 mg (0.5 mg Oral $Given 10/13/24 1757)   cyclobenzaprine (FLEXERIL) tablet 10 mg (10 mg Oral $Given 10/13/24 1757)   empagliflozin  (JARDIANCE) tablet 10 mg (10 mg Oral Not Given 10/13/24 1351)   ondansetron (ZOFRAN ODT) ODT tab 4 mg (has no administration in time range)   paliperidone (INVEGA) 24 hr tablet 1.5 mg (1.5 mg Oral Not Given 10/13/24 1352)   paliperidone ER (INVEGA) 24 hr tablet 3 mg (3 mg Oral Not Given 10/13/24 1352)   PARoxetine (PAXIL) tablet 20 mg (20 mg Oral Not Given 10/13/24 1352)   QUEtiapine (SEROquel) tablet 200 mg (200 mg Oral Not Given 10/13/24 2200)   rosuvastatin (CRESTOR) tablet 40 mg (40 mg Oral Not Given 10/13/24 1353)   testosterone (ANDROGEL/TESTIM) 50 MG/5GM (1%) topical gel 100 mg of testosterone (100 mg of testosterone Transdermal Not Given 10/13/24 1353)   gabapentin (NEURONTIN) tablet 1,200 mg (1,200 mg Oral $Given 10/13/24 2028)   gabapentin (NEURONTIN) tablet 600 mg (600 mg Oral $Given 10/13/24 1757)   cephALEXin (KEFLEX) capsule 500 mg (has no administration in time range)   ondansetron (ZOFRAN) injection 4 mg (4 mg Intravenous Not Given 10/13/24 0305)   ondansetron (ZOFRAN ODT) ODT tab 4 mg (4 mg Oral Not Given 10/13/24 0305)   OLANZapine (zyPREXA) injection 10 mg (10 mg Intramuscular $Given 10/13/24 0648)   OLANZapine (zyPREXA) injection 10 mg (10 mg Intramuscular $Given 10/13/24 0839)   haloperidol lactate (HALDOL) injection 10 mg (10 mg Intramuscular $Given 10/13/24 0926)   LORazepam (ATIVAN) injection 1 mg (1 mg Intravenous $Given 10/13/24 1644)   droPERidol (INAPSINE) injection 2.5 mg (2.5 mg Intravenous Not Given 10/13/24 1736)   haloperidol lactate (HALDOL) injection 10 mg (10 mg Intramuscular $Given 10/13/24 1803)   LORazepam (ATIVAN) injection 0.5 mg (0.5 mg Intravenous $Given 10/13/24 2026)   cefTRIAXone (ROCEPHIN) 1 g vial to attach to  mL bag for ADULTS or NS 50 mL bag for PEDS (0 g Intravenous Stopped 10/14/24 0033)         ADLs    Meal Provided this shift? Yes    Hygiene items provided? Yes    ADLs completed? Yes    Date of last shower: PTA    Any significant events this shift?  No    Any information that would be helpful in caring for this patient?  N/a    Family present/updated? Yes    Location of patient's belongings: DEC office. Pt allowed to have cell phone    Critical Care Minutes:  Does the patient need critical care minutes documented? No

## 2024-10-14 NOTE — TELEPHONE ENCOUNTER
2:36 PM MRN provided to unit 32 CRN for review, awaiting callback  3:14 PM Unit 32 CRN declined d/t refusing meds, multiple restraints, and acuity pt is not appropriate. WL updated. Pt added to admit board.    R:  [place]  Access Inpatient Bed Call Log 10/14/2024 8:04:40 AM  Intake has called facilities that have not updated the bed status within the last 12 hours.         (Adults);        METRO:                [facility] is posting 0 beds.             [facility] is posting 0 beds. [phone]:   [facility] is posting 0 beds. Negative covid required.   [facility] is posting 0 beds. Neg covid. No high school/Deysi-psych. [phone]  [facility] is posting 0 beds. [phone]   [facility] is posting 0 bed. [phone]    [facility] is posting 2 beds. (Ages 18-35) Negative covid. [phone] Per call at 10:35 am to [place] currently FULL.  [facility] is posting 0 beds.    [facility] ([facility]) is posting 0 beds [phone]       STATEWIDE:  [facility] is posting 0 beds. LOW acuity ONLY. Mixed unit 12+. Negative covid- [phone]   [facility] has 1 beds posted. No aggression. Negative Covid. Low acuity.   [facility] ([place]) is posting 0 beds. Low acuity only. Neg covid.  [phone]    [facility] is posting 2 beds. Low acuity. No current aggression.     [facility] is posting 0 beds. Negative covid. [phone].    [facility] ([place]) is posting 0 beds available. Negative covid.  [phone].       [facility] is posting 1 beds. Low acuity. 72 HH hold preferred. Negative covid required. [phone].   [facility] ([place]) is posting 3 beds. Low acuity only. Neg covid.  [phone]. Per call at 10:11 am to Cyn Simmons meets exclusionary d/t aggression.  [facility] in [place] is posting 0 beds.  Negative covid required.   Vol only, No history of aggression, violence, or assault.  No sexual offenders. No 72 HH holds. [phone]  PT NOT APPROPRIATE D/T [place] 72 HH      [facility] is posting 1  beds. Negative covid required.  (Must have the cognitive ability to do programming. No aggressive or violent behavior or recent HX in the last 2 yrs. MH must be primary.) Always low acuity. [phone]    [facility] has 0 beds posted. Negative covid required.  Low acuity only. Violence and aggression capped.  [phone]  [facility] is posting 2 beds. Low acuity, Negative covid required. [phone] 8:08AM PER ATUL, NO OPEN BEDS CURRENTLY, CB AFTER 9:00AM.  [facility] posting 1 beds Negative covid required.  [phone]. 10/13 [place] d/t Pt's acuity [facility] [place] is posting 1 beds. Negative covid. LOW acuity. (No lines, drains, or tubes, oxygen, CPAP, IV, etc.) Must Have a Ride Home. [phone] 8:07AM UNKNOWN AT THIS TIME, CB AFTER 9:00AM.  [facility] is posting 1 beds. Negative covid. (No. lines, drains, or tubes, oxygen, CPAP, IV, etc.) [phone]    [facility] is posting 4 beds. No covid test required. OUT OF STATE. [phone] 8:04AM 2 PEDS, AND 5 ADULTS BH BEDS. PT NOT APPROPRIATE D/T [place] 72 HH      Pt remains on the work list pending appropriate bed availability.

## 2024-10-14 NOTE — ED NOTES
"Pt refused all his morning meds accept oral keflex for his UTI. Pt explained all the meds to pt and told him what each med is for and pt still refused, stating, \"I wont be taking any of those, thank you.\" Pt has a visitor here today Zeb Martinez, she states she is a provider that works with him from group home. She is updated on pt plan of care and pt was happy to see her.   "

## 2024-10-14 NOTE — ED NOTES
Pt requesting more ativan for anxiety. Pt pacing around room but cooperative. 1:1 sitter at bedside. RN will continue to monitor pt.

## 2024-10-14 NOTE — TELEPHONE ENCOUNTER
R;  Intake received RF 10/14/2024 3:21:51 PM WRITTEN REQUEST FOR AUTHORIZATION TO IMPOSE TREATMENT AND REQUEST FOR HEARING. Filed in Court/Commitment/[place]/Hold folder.    3:23 PM Kevin CRN provided Pt MRN for re review. Pt will be reviewed next shift by evening RN, awaiting callback.  4:09 PM Unit 30 CRN will review Pt for admission,Intake provided MRN,awaiting decision.  4:24 PM Unit 30 CRN declined pt d/t multiple restraints/seclusion and overall acuity.  11:02 PM Per follow up call to Kevin CRN will need MRN again for further review, awaiting callback.    R:  [place]  Access Inpatient Bed Call Log 10/14/2024 4PM  Intake has called facilities that have not updated the bed status within the last 12 hours.         (Adults);        METRO:                [facility] is posting 0 beds.             [facility] is posting 0 beds. [phone]:   [facility] is posting 0 beds. Negative covid required.   [facility] is posting 0 beds. Neg covid. No high school/Deysi-psych. [phone]  [place] is posting 0 beds. [phone]   [facility] is posting 0 bed. [phone]    [facility] is posting 2 beds. (Ages 18-35) Negative covid. [phone]  [facility] is posting 0 beds.    [facility] ([facility]) is posting 0 beds [phone]       STATEWIDE:  [facility] is posting 0 beds. LOW acuity ONLY. Mixed unit 12+. Negative covid- [phone]   [facility] has 1 bed posted. No aggression. Negative Covid. Low acuity.   [facility] ([place]) is posting 0 beds. Low acuity only. Neg covid.  [phone]    [facility] is posting 1 bed. Low acuity. No current aggression.     [facility] is posting 0 beds. Negative covid. [phone].    [facility] ([place]) is posting 0 beds available. Negative covid.  [phone].       [facility] is posting 1 beds. Low acuity. 72 HH hold preferred. Negative covid required. [phone].   [facility] ([place]) is posting 3 beds. Low acuity only. Neg covid.  [phone].   [facility] in [place] is posting 0 beds.  Negative covid required.   Vol only, No history of aggression, violence, or assault. No sexual offenders. No 72 HH holds. [phone]        [facility] is posting 1 bed. Negative covid required.  (Must have the cognitive ability to do programming. No aggressive or violent behavior or recent HX in the last 2 yrs. MH must be primary.) Always low acuity. [phone]    [facility] has 0 beds posted. Negative covid required.  Low acuity only. Violence and aggression capped.  [phone]  [facility] is posting 2 beds. Low acuity, Negative covid required. [phone]  [facility] posting 1 bed Negative covid required.  [phone].   [facility] [place] is posting 1 bed. Negative covid. LOW acuity. (No lines, drains, or tubes, oxygen, CPAP, IV, etc.) Must Have a Ride Home. [phone] 8:07AM UNKNOWN AT THIS TIME, CB AFTER 9:00AM.  [facility] is posting 1 bed. Negative covid. (No. lines, drains, or tubes, oxygen, CPAP, IV, etc.) [phone]    [facility] is posting 6 beds. No covid test required. OUT OF STATE. [phone] 8:04AM 2 PEDS, AND 5 ADULTS BH BEDS.      Pt remains on the work list pending appropriate bed availability.
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
## 2024-10-14 NOTE — ED NOTES
Patient having periods of acting out. Patient pacing the room and hitting the wall with his fists.  Writer explained that patient could pace but not hit the walls or yell.  Patient eventually calmed down and is resting on his bed.
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
## 2024-10-14 NOTE — ED NOTES
RN ED Mental Health Handoff Note    72 hour hold    Does patient require 1:1? Yes    Hold and rights been given and documented for patient: Yes    Is the patient in BH scrubs? No    Has the patient been searched? Yes    Is the 15 minute observation tool up to date? Yes    Was patient issued a welcome folder? Yes    Room check completed this shift: Yes    PSS3 and [place] Assessment/Reassessment this shift:    C-SSRS ([place])      Date and Time Q1 Wished to be Dead (Past Month) Q2 Suicidal Thoughts (Past Month) Q3 Suicidal Thought Method Q4 Suicidal Intent without Specific Plan Q5 Suicide Intent with Specific Plan Q6 Suicide Behavior (Lifetime) If yes to Q6, within past 3 months? Level of Risk per Screen Level of Risk per Screen User   10/12/24 2356 -- -- -- -- -- 1-->yes -- -- -- PJB   10/12/24 2355 0-->no 0-->no -- -- -- -- -- -- no risks indicated PJB   10/12/24 2047 1-->yes 1-->yes 0-->no 0-->no 0-->no 1-->yes 0-->no -- moderate risk MS            Behavioral status of patient: Green    Code 21 called this shift? No    Use of restraints/seclusion this shift? No    Most recent vital signs:  Temp: 99  F (37.2  C)   BP: (!) 120/97 Pulse: 91   Resp: 16 SpO2: 96 % O2 Device: None (Room air)      Medications:  Scheduled medication compliance? Pt refused all meds this AM and only took his keflex for UTI  PRN Meds administered this shift? yes    Medications   OLANZapine (zyPREXA) injection 10 mg ( Intramuscular Canceled Entry 10/13/24 0110)   nicotine (NICORETTE) gum 4 mg (4 mg Buccal $Given 10/14/24 0033)   OLANZapine (zyPREXA) injection 10 mg (has no administration in time range)   acetaminophen (TYLENOL) tablet 325-650 mg (has no administration in time range)   amLODIPine (NORVASC) tablet 5 mg (5 mg Oral Not Given 10/13/24 1350)   ARIPiprazole (ABILIFY) tablet 10 mg (10 mg Oral Not Given 10/13/24 1351)   clonazePAM (klonoPIN) tablet 0.5 mg (0.5 mg Oral $Given 10/13/24 4439)   cyclobenzaprine (FLEXERIL) tablet 10 mg  (10 mg Oral $Given 10/13/24 1757)   empagliflozin (JARDIANCE) tablet 10 mg (10 mg Oral Not Given 10/13/24 1351)   ondansetron (ZOFRAN ODT) ODT tab 4 mg (has no administration in time range)   paliperidone (INVEGA) 24 hr tablet 1.5 mg (1.5 mg Oral Not Given 10/13/24 1352)   paliperidone ER (INVEGA) 24 hr tablet 3 mg (3 mg Oral Not Given 10/13/24 1352)   PARoxetine (PAXIL) tablet 20 mg (20 mg Oral Not Given 10/13/24 1352)   QUEtiapine (SEROquel) tablet 200 mg (200 mg Oral Not Given 10/13/24 2200)   rosuvastatin (CRESTOR) tablet 40 mg (40 mg Oral Not Given 10/13/24 1353)   testosterone (ANDROGEL/TESTIM) 50 MG/5GM (1%) topical gel 100 mg of testosterone (100 mg of testosterone Transdermal Not Given 10/13/24 1353)   gabapentin (NEURONTIN) tablet 1,200 mg (1,200 mg Oral $Given 10/13/24 2028)   gabapentin (NEURONTIN) tablet 600 mg (600 mg Oral $Given 10/13/24 1757)   cephALEXin (KEFLEX) capsule 500 mg (has no administration in time range)   ondansetron (ZOFRAN) injection 4 mg (4 mg Intravenous Not Given 10/13/24 0305)   ondansetron (ZOFRAN ODT) ODT tab 4 mg (4 mg Oral Not Given 10/13/24 0305)   OLANZapine (zyPREXA) injection 10 mg (10 mg Intramuscular $Given 10/13/24 0648)   OLANZapine (zyPREXA) injection 10 mg (10 mg Intramuscular $Given 10/13/24 0839)   haloperidol lactate (HALDOL) injection 10 mg (10 mg Intramuscular $Given 10/13/24 0926)   LORazepam (ATIVAN) injection 1 mg (1 mg Intravenous $Given 10/13/24 1644)   droPERidol (INAPSINE) injection 2.5 mg (2.5 mg Intravenous Not Given 10/13/24 1736)   haloperidol lactate (HALDOL) injection 10 mg (10 mg Intramuscular $Given 10/13/24 1803)   LORazepam (ATIVAN) injection 0.5 mg (0.5 mg Intravenous $Given 10/13/24 2026)   cefTRIAXone (ROCEPHIN) 1 g vial to attach to  mL bag for ADULTS or NS 50 mL bag for PEDS (0 g Intravenous Stopped 10/14/24 0033)         ADLs    Meal Provided this shift? Yes    Hygiene items provided? Yes    ADLs completed? No    Date of last shower:  uknown    Any significant events this shift? No    Any information that would be helpful in caring for this patient?  Waiting for inpatient psych bed    Family present/updated? No    Location of patient's belongings: DEC, pt ok  to have cell phone     Critical Care Minutes:  Does the patient need critical care minutes documented? No

## 2024-10-14 NOTE — ED NOTES
IP MH Referral Acuity Rating Score (RARS)    LMHP complete at referral to IP MH, with DEC; and, daily while awaiting IP MH placement. Call score to PPS.  CRITERIA SCORING   New 72 HH and Involuntary for IP MH (not adolescent) 1/1   Boarding over 24 hours 1/1   Vulnerable adult at least 55+ with multiple co morbidities; or, Patient age 11 or under 0/1   Suicide ideation without relief of precipitating factors 1/1   Current plan for suicide 0/1   Current plan for homicide 0/1   Imminent risk or actual attempt to seriously harm another without relief of factors precipitating the attempt 1/1   Severe dysfunction in daily living (ex: complete neglect for self care, extreme disruption in vegetative function, extreme deterioration in social interactions) 1/1   Recent (last 2 weeks) or current physical aggression in the ED 1/1   Restraints or seclusion episode in ED 1/1   Verbal aggression, agitation, yelling, etc., while in the ED 1/1   Active psychosis with psychomotor agitation or catatonia 0/1   Need for constant or near constant redirection (from leaving, from others, etc).  1/1   Intrusive or disruptive behaviors 1/1   TOTAL Acuity Total Score: 10

## 2024-10-14 NOTE — ED NOTES
"Pt refused scheduled gabapentin- pt stating he is \"feeling extremely anxious and you might need to put me in restraints.\" Pt requests same \"medications as last night.\" MD aware- haldol and benadryl ordered. Security on standby- pt cooperative while getting IM injections  "

## 2024-10-14 NOTE — TELEPHONE ENCOUNTER
R: [place]  Access Inpatient Bed Call Log 10/13/24 11:34 PM    Intake has called facilities that have not updated the bed status within the last 12 hours.                             [facility] is at capacity.           [facility] is posting 0 beds. [phone] 11:40 PM Per Kamissa they are currently at capacity.  [facility] is posting 0 beds. Negative covid required. Low acuity. [phone]  [facility] is posting 0 beds. Neg covid. No high school/Deysi-psych. 11:38 PM Per Ishtu they are currently at capacity.  [facility] is posting 0 beds. [phone]  [facility] is posting 0 beds. [phone]  [facility] is posting 0 bed.  [phone].  Low acuity.  [facility] is posting 0 beds. Negative covid. [phone]  [facility] is posting 1 bed [phone]. Low acuity. 1:14 AM Per Maximilian they are at capacity until 9AM.     [facility] is posting 1 bed. LOW acuity. Ages 12+. Neg covid- [phone] 1:15 AM Per Lucie they are at capacity.  [facility] has 1 bed posted. No aggression. Negative Covid. Low acuity. :14 AM Per Maximilian they are at capacity until 9AM.  [facility] is posting 0 beds. Low acuity only. Neg covid.  [phone]   [facility] is posting 3 beds. Low acuity. No current aggression.   [facility] is posting 0 beds. Negative covid. [phone]  [facility] is posting 0 beds available. Negative covid.  [phone].      [facility] is posting 1 bed. Low acuity. 72 HH hold preferred. Deysi unit. Negative covid required. [phone]  [facility] is posting 3 beds. Low acuity only. Neg covid.  [phone]  [facility] [place] is posting 0 beds.  Negative covid required.   Vol only, No history of aggression, violence, or assault. No sexual offenders. No 72  holds. [phone]     [facility] is posting 1 bed.  Negative covid required.  (Must have the cognitive ability to do programming. No aggressive or violent behavior or recent HX in the last 2 yrs.  must be primary.) Always low acuity. [phone]  [facility] has 0 beds posted. Negative covid required.  Low acuity only. Violence and aggression capped.  [phone]  [facility] is posting 2 beds. Lower acuity, Negative covid required. [phone] 12:03 AM Per Irene they are currently at capacity.  [facility] posting 1 bed. Negative covid required.  [phone] Low acuity only.  [facility] is posting 1 bed. Negative covid. LOW acuity. (No lines, drains, or tubes, oxygen, CPAP, IV, etc.) Must Have a Ride Home. [phone]  [facility] is posting 1 bed. Negative covid. (No. lines, drains, or tubes, oxygen, CPAP, IV, etc.) [phone]  [facility] is posting 4 beds. No covid test required. [phone]      Pt remains on the work list pending appropriate bed availability.

## 2024-10-14 NOTE — PROGRESS NOTES
"Triage and Transition Services Extended Care Reassessment     Patient: Prakash goes by \"Prakash,\" uses he/him pronouns  Date of Service: October 14, 2024  Site of Service: [facility] EMERGENCY DEPT                             ED08  Patient was seen yes  Mode of Assessment: In person     Reason for Reassessment: suicidal ideation, worsening psychosocial stress, significant behavior change, depression    History of Patient's Original Emergency Room Encounter: From original assessment; 10/12/2024 23xx hours:   Patient reported that he was not feeling well mentally or physically. Patient denied any triggers that happened. Patient reported that he took medications, he denied that it was an attempt to harm himself. Patient reported that he was trying to relax. Patient reported that they had been feeling dizzy and nauseous. Patient reported that they called the ambulance when they started to have physical symptoms. Patient stated, \"I wouldn't have called an ambulance if I wanted to die.\" Patient minimized symptoms throughout visit and gave minimal responses. Patient minimally engaged in safety planning and did not want collateral called, stating they will not know what happened. At the end of the assessment he started to open up about stressors that occurred today. He shared that he found out that someone, he considered a friend, was telling his staff that he felt that the patient was to at risk to be living on their own related to recent hospitalizations. He shared that he lived in a group home from 2021 until August of 2024 when he was able to move into his own apartment. He does not want to lose his ability to live in his own place. He reported that he has contact with [employer] ([employer]) on a daily basis......Patient was hospitalized in September for suicidal ideations, self-injurious behavior. He reported that he is currently under committment through [facility]. He reported that he " "stopped taking his medications, except Klonopin a couple of weeks ago. He initailly reported that he was not participating in any of his mental health services. At the end of the visit he reported that he has continued to meet with his [facility] worker twice weekly and talks to his Committment  almost daily....Prakash says he \"does not remember\" why he is on commitment through [facility].  Writer sees patient Prakash several hours after initial assessment.  Prakash was minimally responsive in intial assessment. He was reasonably cooperative in reassessment, albeit, continuing to be brief, evasive and visibly irritable. Writer noted some inconsistencies in patient responses regarding, for example, engagement with therapist.  Prakash did say he is currently refusing medications.  He says he is not taking mental health medications and refusing to do so at present. He does not offer his reason for not wanting the medications for which he is prescribed.  While he had a couple of friends on his safety plan last completed in an ED encounter, he has lost those friends.  He is living alone in an apartment since leaving his group home in August.  Prakash coded last night.  He is less aggressive than reported earlier.  He has not been restrained for several hours now in this encounter.  However, Prakash was seen to be pacing, nonstop.   Patient reportedly ingested 20 400 mg Gabapentin and 10 Klonopin, \"to relax.\"   Prakash said he was \"at end of rope\" with stressors.  Addison will only tell  that \"it's a long story,\" regarding stressors and these have to do with \"networking and relationships.   Prakash continues to repeat that he \"just wants to go home.\"  Prakash does have a dog at home.  Patient's mother, Negra, says ICS (community support) worker should be at home this morning. Prakash has PMH dx to include ADHD, BiPAD, BPD, MDD, Mood disorder and anxiety.  Prakash continues to deny the ingestion was a suicide " "attempt.  He cannot or will not indicate why he took as much of these medications as he did. Prakash reports daily cannabis use for relaxation.    Current Patient Presentation: Pt lying on side on gurney in darkened room, covered by blanket expect for head.    Presentation Summary: Met with pt with consulting psychiatry provider, SOPHIA Glass, who introduced selves, explained roles, and asked permission to visit. Pt stated: \"I'd like to go home. I didn't need to be here in the first place.\" Pt denied that the overdose of medication he took on Saturday was a suicide attempt. Pt stated that today he has had only passive SI today and denied plan and intent. Pt denied current A/V hallucinations and did not appear to be responding to internal stimuli. Pt stated that he stopped taking his medications about a week ago, because \"I don't want to do it anymore.\" Pt has been refusing psychiatric medications in ED and stated during interview that he will continue to refuse.   Pt acknowledged that he has been prescribed antibiotics in the ED for a UTI and did not state clear opposition to taking those medications. Pt stated that he took out his catheter about a week ago at home. Pt stated that this was not at the direction of a healthcare provider, and he did not give a reason for doing so. Pt stated that he thought that's probably why he has a UTI again after being medically hospitalized last month with one.  Pt was apathetic during interview. Pt's actions, including removing catheter and thinking that caused UTI, stopping psych meds, and refusing psych meds in ED all suggest pt has more intent for self harm or death than pt verbalizes. POC reviewed with pt, including that IP MH placement search continues.      Changes Observed Since Initial Assessment: provider request    Therapeutic Interventions Provided: Engaged in guided discovery, explored patient's perspectives and helped expand them through socratic dialogue., " "Explored motivation for treatment engagement, Explored motivation for behavioral change.    Current Symptoms: anxious avoidance, difficulty concentrating, withdrawl/isolation, negativistic, impaired decision making, irritable, thoughts of death/suicide, apathy, helplessness, hoplessness, sadness anxious impulsive, displaces blame, distractability, high risk behavior (no evidence of acute psychosis)  (no eating symptoms reported today)    Mental Status Exam   Affect: Flat  Appearance: Disheveled  Attention Span/Concentration:  (limited)  Eye Contact: Avoidant    Fund of Knowledge: Appropriate (gives inconsistent history)   Language /Speech Content: Fluent (brief responses)  Language /Speech Volume:  (WDL)  Language /Speech Rate/Productions:  (WDL; brief responses)  Recent Memory: Variable  Remote Memory: Variable  Mood: Depressed, Irritable, Sad  Orientation to Person: Yes   Orientation to Place: Yes  Orientation to Time of Day: Yes  Orientation to Date: Yes     Situation (Do they understand why they are here?): Answer (please comment) (\"I want to go home. I didn't need to be here in the first place.\")  Psychomotor Behavior: Underactive  Thought Content: Suicidal  Thought Form: Obsessive/Perseverative    Treatment Objective(s) Addressed: assessing safety, identifying treatment goals, identifying additional supports    Patient Response to Interventions: unacceptance expressed, needs reinforcement    Progress Towards Goals:  Patient Reports Symptoms Are: ongoing  Patient Progress Toward Goals: is not making progress  Comment: Pt refusing psych meds in ED  Next Step to Work Toward Discharge: symptom stabilization, patient ability to engage in safety planning, collaboration with OP team/family/friends, means restriction  Means Restriction: Patient is being referred to [facility] for safety, stabilization and treatment    Case Management: Case Management Included: collaborating with patient's support system  Details on " Collaborating with Patient's Support System: Spoke with [facility] case mgmnt supervisor Milagros Alberto ([phone]) who stated that she will request revocation of provisional discharge. Milagros confirmed that pt has not been doing well recently in independent living. Milagros confirmed that currently pt has been managing his own meds, though case mgrs have been working on a plan for additional supports around that.    C-SSRS Since Last Contact:   1. Wish to be Dead (Since Last Contact): Yes  2. Non-Specific Active Suicidal Thoughts (Since Last Contact): Yes  3. Active Suicidal Ideation with any Methods (Not Plan) Without Intent to Act (Since Last Contact): No  4. Active Suicidal Ideation with Some Intent to Act, Without Specific Plan (Since Last Contact): No  5. Active Suicidal Ideation with Specific Plan and Intent (Since Last Contact): No  Most Severe Ideation Rating (Since Last Contact):  (pt did not provide details)  Frequency (Since Last Contact):  (pt did not provide details)  Duration (Since Last Contact):  (pt did not provide details)  Controllability (Since Last Contact):  (pt did not provide details)  Deterrents (Since Last Contact):  (pt did not provide details)  Reasons for Ideation (Since Last Contact):  (pt did not provide details)  Actual Attempt (Since Last Contact): No  Has subject engaged in non-suicidal self-injurious behavior? (Since Last Contact): No  Interrupted Attempts (Since Last Contact): No  Aborted or Self-Interrupted Attempt (Since Last Contact): No  Preparatory Acts or Behavior (Since Last Contact): No  Suicide (Since Last Contact): No  Most Lethal Attempt Date: 06/10/22  Actual Lethality/Medical Damage Code (Most Lethal Attempt): Moderate physical damage, medical attention needed  Calculated C-SSRS Risk Score (Since Last Contact): Low Risk    Plan: Final Disposition / Recommended Care Path: inpatient mental health  Plan for Care reviewed with assigned Medical Provider: yes  Plan for Care Team  Review: provider, other (see comment)  Comments: Black Mays MD, and SOPHIA Glass  Patient and/or validated legal guardian concurs: no (72HH;  revoking provisional discharge)    Clinical Substantiation: Continue to recommend IP MH admission for pt safety and stabilization due to acute mental health decompensation including SI, helplessness, hopelessness, apathy, and avoidance rendering pt unable to safely care for self outside of secure setting. Pt took overdose of medications and called 911, resulting in ED presentation. Medication nonadherence in community and refusing psychiatric medication in ED exacerbates risk. Commitment  stated today intends to revoke provisional discharge. 72HH continues while waiting court hold from [facility].    Legal Status: Legal Status at Admission: 72 Hour Hold  72 Hour Hold - Date/Time Initiated: 0120 Sun 10/13  72 Hour Hold - Date/Time Ends: 2359 Thurs 10/17    Session Status: Time session started: 1239  Time session ended: 1250  Session Duration (minutes): 11 minutes  Session Number: 0  Anticipated number of sessions or this episode of care: 2  Date of most recent diagnostic assessment:  (none found; reach out to [facility])    Session Start Time: 1239  Session Stop Time: 1250  CPT codes: Non-Billable  Time Spent: 10 minutes      CPT code(s) utilized: Non-Billable    Diagnoses:    Active Hospital Problems    *Schizoaffective disorder, bipolar type (H)      Borderline personality disorder (H)      ADHD (attention deficit hyperactivity disorder)        CINTHYA GUERRERO, [facility]   Licensed Mental Health Professional (LMHP), [facility]  [phone]

## 2024-10-15 ENCOUNTER — TELEPHONE (OUTPATIENT)
Dept: BEHAVIORAL HEALTH | Facility: CLINIC | Age: 34
End: 2024-10-15
Payer: MEDICARE

## 2024-10-15 LAB
C DIFF GDH STL QL IA: POSITIVE
C DIFF TOX A+B STL QL IA: NEGATIVE
C DIFF TOX B STL QL: POSITIVE
GLUCOSE BLDC GLUCOMTR-MCNC: 118 MG/DL (ref 70–99)

## 2024-10-15 PROCEDURE — 250N000011 HC RX IP 250 OP 636: Performed by: SOCIAL WORKER

## 2024-10-15 PROCEDURE — 250N000013 HC RX MED GY IP 250 OP 250 PS 637: Performed by: EMERGENCY MEDICINE

## 2024-10-15 PROCEDURE — 96372 THER/PROPH/DIAG INJ SC/IM: CPT | Performed by: SOCIAL WORKER

## 2024-10-15 PROCEDURE — 250N000013 HC RX MED GY IP 250 OP 250 PS 637: Performed by: SOCIAL WORKER

## 2024-10-15 PROCEDURE — 87324 CLOSTRIDIUM AG IA: CPT | Performed by: EMERGENCY MEDICINE

## 2024-10-15 PROCEDURE — A9270 NON-COVERED ITEM OR SERVICE: HCPCS | Performed by: SOCIAL WORKER

## 2024-10-15 PROCEDURE — 82962 GLUCOSE BLOOD TEST: CPT

## 2024-10-15 PROCEDURE — 87493 C DIFF AMPLIFIED PROBE: CPT | Performed by: EMERGENCY MEDICINE

## 2024-10-15 RX ORDER — HALOPERIDOL 5 MG/ML
5 INJECTION INTRAMUSCULAR
Status: COMPLETED | OUTPATIENT
Start: 2024-10-15 | End: 2024-10-15

## 2024-10-15 RX ORDER — VANCOMYCIN HYDROCHLORIDE 125 MG/1
125 CAPSULE ORAL 4 TIMES DAILY
Status: DISCONTINUED | OUTPATIENT
Start: 2024-10-15 | End: 2024-10-15

## 2024-10-15 RX ORDER — DIPHENHYDRAMINE HYDROCHLORIDE 50 MG/ML
50 INJECTION INTRAMUSCULAR; INTRAVENOUS
Status: COMPLETED | OUTPATIENT
Start: 2024-10-15 | End: 2024-10-15

## 2024-10-15 RX ADMIN — QUETIAPINE 200 MG: 200 TABLET, FILM COATED ORAL at 23:32

## 2024-10-15 RX ADMIN — HALOPERIDOL LACTATE 5 MG: 5 INJECTION, SOLUTION INTRAMUSCULAR at 11:54

## 2024-10-15 RX ADMIN — NICOTINE POLACRILEX 4 MG: 2 GUM, CHEWING ORAL at 13:21

## 2024-10-15 RX ADMIN — CLONAZEPAM 0.5 MG: 0.5 TABLET ORAL at 10:43

## 2024-10-15 RX ADMIN — ACETAMINOPHEN 650 MG: 325 TABLET, FILM COATED ORAL at 19:17

## 2024-10-15 RX ADMIN — PALIPERIDONE 3 MG: 3 TABLET, EXTENDED RELEASE ORAL at 09:04

## 2024-10-15 RX ADMIN — ROSUVASTATIN CALCIUM 40 MG: 20 TABLET, FILM COATED ORAL at 09:04

## 2024-10-15 RX ADMIN — GABAPENTIN 600 MG: 600 TABLET, FILM COATED ORAL at 14:04

## 2024-10-15 RX ADMIN — TESTOSTERONE 100 MG OF TESTOSTERONE: 50 GEL TRANSDERMAL at 09:32

## 2024-10-15 RX ADMIN — VANCOMYCIN HYDROCHLORIDE 125 MG: 125 CAPSULE ORAL at 22:55

## 2024-10-15 RX ADMIN — CEPHALEXIN 500 MG: 500 CAPSULE ORAL at 09:02

## 2024-10-15 RX ADMIN — NICOTINE POLACRILEX 4 MG: 2 GUM, CHEWING ORAL at 15:35

## 2024-10-15 RX ADMIN — PALIPERIDONE 1.5 MG: 1.5 TABLET, EXTENDED RELEASE ORAL at 09:04

## 2024-10-15 RX ADMIN — NICOTINE POLACRILEX 4 MG: 2 GUM, CHEWING ORAL at 10:44

## 2024-10-15 RX ADMIN — CEPHALEXIN 500 MG: 500 CAPSULE ORAL at 20:21

## 2024-10-15 RX ADMIN — PAROXETINE HYDROCHLORIDE 20 MG: 20 TABLET, FILM COATED ORAL at 09:03

## 2024-10-15 RX ADMIN — DIPHENHYDRAMINE HYDROCHLORIDE 50 MG: 50 INJECTION, SOLUTION INTRAMUSCULAR; INTRAVENOUS at 11:54

## 2024-10-15 RX ADMIN — AMLODIPINE BESYLATE 5 MG: 5 TABLET ORAL at 09:05

## 2024-10-15 RX ADMIN — NICOTINE POLACRILEX 4 MG: 2 GUM, CHEWING ORAL at 09:03

## 2024-10-15 RX ADMIN — CLONAZEPAM 0.5 MG: 0.5 TABLET ORAL at 14:57

## 2024-10-15 RX ADMIN — ARIPIPRAZOLE 10 MG: 10 TABLET ORAL at 09:02

## 2024-10-15 RX ADMIN — EMPAGLIFLOZIN 10 MG: 10 TABLET, FILM COATED ORAL at 09:04

## 2024-10-15 RX ADMIN — GABAPENTIN 1200 MG: 600 TABLET, FILM COATED ORAL at 09:03

## 2024-10-15 RX ADMIN — GABAPENTIN 1200 MG: 600 TABLET, FILM COATED ORAL at 19:17

## 2024-10-15 RX ADMIN — ACETAMINOPHEN 650 MG: 325 TABLET, FILM COATED ORAL at 10:43

## 2024-10-15 RX ADMIN — NICOTINE POLACRILEX 4 MG: 2 GUM, CHEWING ORAL at 20:21

## 2024-10-15 ASSESSMENT — ACTIVITIES OF DAILY LIVING (ADL)
ADLS_ACUITY_SCORE: 39

## 2024-10-15 NOTE — ED NOTES
RN ED Mental Health Handoff Note    72 hour hold    Does patient require 1:1? Yes    Hold and rights been given and documented for patient: Yes    Is the patient in BH scrubs? No     Has the patient been searched? Yes    Is the 15 minute observation tool up to date? Yes    Was patient issued a welcome folder? Yes    Room check completed this shift: Yes    PSS3 and [place] Assessment/Reassessment this shift:    C-SSRS ([place])      Date and Time Q1 Wished to be Dead (Past Month) Q2 Suicidal Thoughts (Past Month) Q3 Suicidal Thought Method Q4 Suicidal Intent without Specific Plan Q5 Suicide Intent with Specific Plan Q6 Suicide Behavior (Lifetime) If yes to Q6, within past 3 months? Level of Risk per Screen Level of Risk per Screen User   10/12/24 2356 -- -- -- -- -- 1-->yes -- -- -- PJB   10/12/24 2355 0-->no 0-->no -- -- -- -- -- -- no risks indicated PJB   10/12/24 2047 1-->yes 1-->yes 0-->no 0-->no 0-->no 1-->yes 0-->no -- moderate risk MS            Behavioral status of patient: Green    Code 21 called this shift? No    Use of restraints/seclusion this shift? No    Most recent vital signs:  Temp: 97.6  F (36.4  C) Temp src: Oral BP: (!) 144/97 Pulse: 94   Resp: 18 SpO2: 94 % O2 Device: None (Room air)      Medications:  Scheduled medication compliance? Yes    PRN Meds administered this shift? Yes    Medications   OLANZapine (zyPREXA) injection 10 mg ( Intramuscular Canceled Entry 10/13/24 0110)   nicotine (NICORETTE) gum 4 mg (4 mg Buccal $Given 10/15/24 0903)   OLANZapine (zyPREXA) injection 10 mg (10 mg Intramuscular $Given 10/14/24 1156)   acetaminophen (TYLENOL) tablet 325-650 mg (has no administration in time range)   amLODIPine (NORVASC) tablet 5 mg (5 mg Oral $Given 10/15/24 0905)   ARIPiprazole (ABILIFY) tablet 10 mg (10 mg Oral $Given 10/15/24 0902)   clonazePAM (klonoPIN) tablet 0.5 mg (0.5 mg Oral $Given 10/14/24 8358)   cyclobenzaprine (FLEXERIL) tablet 10 mg (10 mg Oral $Given 10/13/24 1340)    empagliflozin (JARDIANCE) tablet 10 mg (10 mg Oral $Given 10/15/24 0904)   ondansetron (ZOFRAN ODT) ODT tab 4 mg (has no administration in time range)   paliperidone (INVEGA) 24 hr tablet 1.5 mg (1.5 mg Oral $Given 10/15/24 0904)   paliperidone ER (INVEGA) 24 hr tablet 3 mg (3 mg Oral $Given 10/15/24 0904)   PARoxetine (PAXIL) tablet 20 mg (20 mg Oral $Given 10/15/24 0903)   QUEtiapine (SEROquel) tablet 200 mg (200 mg Oral $Given 10/14/24 2136)   rosuvastatin (CRESTOR) tablet 40 mg (40 mg Oral $Given 10/15/24 0904)   testosterone (ANDROGEL/TESTIM) 50 MG/5GM (1%) topical gel 100 mg of testosterone (100 mg of testosterone Transdermal $Given 10/15/24 0932)   gabapentin (NEURONTIN) tablet 1,200 mg (1,200 mg Oral $Given 10/15/24 0903)   gabapentin (NEURONTIN) tablet 600 mg (600 mg Oral Not Given 10/14/24 1512)   cephALEXin (KEFLEX) capsule 500 mg (500 mg Oral $Given 10/15/24 0902)   hydrOXYzine HCl (ATARAX) tablet 25 mg (25 mg Oral $Given 10/14/24 1802)   haloperidol lactate (HALDOL) injection 5 mg (has no administration in time range)   diphenhydrAMINE (BENADRYL) injection 50 mg (has no administration in time range)   ondansetron (ZOFRAN) injection 4 mg (4 mg Intravenous Not Given 10/13/24 0305)   ondansetron (ZOFRAN ODT) ODT tab 4 mg (4 mg Oral Not Given 10/13/24 0305)   OLANZapine (zyPREXA) injection 10 mg (10 mg Intramuscular $Given 10/13/24 0648)   OLANZapine (zyPREXA) injection 10 mg (10 mg Intramuscular $Given 10/13/24 0839)   haloperidol lactate (HALDOL) injection 10 mg (10 mg Intramuscular $Given 10/13/24 0926)   LORazepam (ATIVAN) injection 1 mg (1 mg Intravenous $Given 10/13/24 1644)   droPERidol (INAPSINE) injection 2.5 mg (2.5 mg Intravenous Not Given 10/13/24 1736)   haloperidol lactate (HALDOL) injection 10 mg (10 mg Intramuscular $Given 10/13/24 1803)   LORazepam (ATIVAN) injection 0.5 mg (0.5 mg Intravenous $Given 10/13/24 2026)   cefTRIAXone (ROCEPHIN) 1 g vial to attach to  mL bag for ADULTS or  NS 50 mL bag for PEDS (0 g Intravenous Stopped 10/14/24 0033)   haloperidol lactate (HALDOL) injection 5 mg (5 mg Intramuscular $Given 10/14/24 0315)   diphenhydrAMINE (BENADRYL) injection 25 mg (25 mg Intramuscular $Given 10/14/24 1455)   haloperidol lactate (HALDOL) injection 5 mg (5 mg Intramuscular $Given 10/14/24 1904)   diphenhydrAMINE (BENADRYL) injection 25 mg (25 mg Intramuscular $Given 10/14/24 1902)         ADLs    Meal Provided this shift? Yes    Hygiene items provided? Yes    ADLs completed? Yes    Date of last shower: pt hasn't showered since Saturday, pt would like to shower, security stated not enough staff to send pt for shower. Pt washed up at the sink, brushed teeth and bed linen changed.     Any significant events this shift? No    Any information that would be helpful in caring for this patient?  Waiting for inpatient psych bed, pt on 72 hr hold    Family present/updated? No    Location of patient's belongings: DEC    Critical Care Minutes:  Does the patient need critical care minutes documented? No

## 2024-10-15 NOTE — ED NOTES
Pt took a shower wit one  and sitter. Pt maintained appropriate behaviors and is now back in his room safely.

## 2024-10-15 NOTE — ED NOTES
"Writer went to administer IM benadryl and haldol, pt was pacing room but receptive to medication, writer advised pt that medications were IM and asked if pt could please roll up both sleeves, pt stated that writer could administer them through their sweatshirt but writer stated that was not best practice and could risk infection concerns, pt cooperative and rolled up sleeve and tolerated both injections well.     Writer asked pt if there was a specific reason why they did not want to take their home medications and whether they made them feel funny or if there was a specific reason for refusal, pt stated \"what's the point of taking those if I am just getting these\" Writer explained that the combination of their home meds and additional meds may help them feel better, but pt did not respond.     Writer asked if pt would like something to eat or drink, pt declined but asked for nicotine gum. Nicotine gum provided, and during administration asked if they \"could get an x-ray.\" Writer asked what they would like an x-ray of and pt raised right hand which was visibly swollen, writer asked if that was r/t punching the wall earlier and pt agreed. Dr. Whelan advised and verbal order for x-ray right hand x-ray was received. Pt remained calm and appropriate with writer during encounter.   "

## 2024-10-15 NOTE — ED NOTES
RN ED Mental Health Handoff Note    Voluntary    Does patient require 1:1? Yes    Hold and rights been given and documented for patient: Yes    Is the patient in BH scrubs? No -Own clothes    Has the patient been searched? Yes    Is the 15 minute observation tool up to date? Yes    Was patient issued a welcome folder? Yes    Room check completed this shift: Yes    PSS3 and [place] Assessment/Reassessment this shift:    C-SSRS ([place])      Date and Time Q1 Wished to be Dead (Past Month) Q2 Suicidal Thoughts (Past Month) Q3 Suicidal Thought Method Q4 Suicidal Intent without Specific Plan Q5 Suicide Intent with Specific Plan Q6 Suicide Behavior (Lifetime) If yes to Q6, within past 3 months? Level of Risk per Screen Level of Risk per Screen User   10/12/24 2356 -- -- -- -- -- 1-->yes -- -- -- PJB   10/12/24 2355 0-->no 0-->no -- -- -- -- -- -- no risks indicated PJB   10/12/24 2047 1-->yes 1-->yes 0-->no 0-->no 0-->no 1-->yes 0-->no -- moderate risk MS            Behavioral status of patient: Green    Code 21 called this shift? No    Use of restraints/seclusion this shift? No    Most recent vital signs:      BP: 132/89 Pulse: 86   Resp: 18 SpO2: 95 % O2 Device: None (Room air)      Medications:  Scheduled medication compliance? Yes    PRN Meds administered this shift? Yes    Medications   OLANZapine (zyPREXA) injection 10 mg ( Intramuscular Canceled Entry 10/13/24 0110)   nicotine (NICORETTE) gum 4 mg (4 mg Buccal $Given 10/14/24 2025)   OLANZapine (zyPREXA) injection 10 mg (10 mg Intramuscular $Given 10/14/24 1156)   acetaminophen (TYLENOL) tablet 325-650 mg (has no administration in time range)   amLODIPine (NORVASC) tablet 5 mg (5 mg Oral Not Given 10/14/24 1038)   ARIPiprazole (ABILIFY) tablet 10 mg (10 mg Oral Not Given 10/14/24 1038)   clonazePAM (klonoPIN) tablet 0.5 mg (0.5 mg Oral $Given 10/14/24 1124)   cyclobenzaprine (FLEXERIL) tablet 10 mg (10 mg Oral $Given 10/13/24 2874)   empagliflozin (JARDIANCE)  tablet 10 mg (10 mg Oral Not Given 10/14/24 1038)   ondansetron (ZOFRAN ODT) ODT tab 4 mg (has no administration in time range)   paliperidone (INVEGA) 24 hr tablet 1.5 mg (1.5 mg Oral Not Given 10/14/24 1039)   paliperidone ER (INVEGA) 24 hr tablet 3 mg (3 mg Oral Not Given 10/14/24 1039)   PARoxetine (PAXIL) tablet 20 mg (20 mg Oral Not Given 10/14/24 1040)   QUEtiapine (SEROquel) tablet 200 mg (200 mg Oral $Given 10/14/24 2136)   rosuvastatin (CRESTOR) tablet 40 mg (40 mg Oral Not Given 10/14/24 1040)   testosterone (ANDROGEL/TESTIM) 50 MG/5GM (1%) topical gel 100 mg of testosterone (100 mg of testosterone Transdermal Not Given 10/14/24 1040)   gabapentin (NEURONTIN) tablet 1,200 mg (1,200 mg Oral $Given 10/14/24 2031)   gabapentin (NEURONTIN) tablet 600 mg (600 mg Oral Not Given 10/14/24 1512)   cephALEXin (KEFLEX) capsule 500 mg (500 mg Oral $Given 10/14/24 2031)   hydrOXYzine HCl (ATARAX) tablet 25 mg (25 mg Oral $Given 10/14/24 1802)   ondansetron (ZOFRAN) injection 4 mg (4 mg Intravenous Not Given 10/13/24 0305)   ondansetron (ZOFRAN ODT) ODT tab 4 mg (4 mg Oral Not Given 10/13/24 0305)   OLANZapine (zyPREXA) injection 10 mg (10 mg Intramuscular $Given 10/13/24 0648)   OLANZapine (zyPREXA) injection 10 mg (10 mg Intramuscular $Given 10/13/24 0839)   haloperidol lactate (HALDOL) injection 10 mg (10 mg Intramuscular $Given 10/13/24 0926)   LORazepam (ATIVAN) injection 1 mg (1 mg Intravenous $Given 10/13/24 1644)   droPERidol (INAPSINE) injection 2.5 mg (2.5 mg Intravenous Not Given 10/13/24 1736)   haloperidol lactate (HALDOL) injection 10 mg (10 mg Intramuscular $Given 10/13/24 1803)   LORazepam (ATIVAN) injection 0.5 mg (0.5 mg Intravenous $Given 10/13/24 2026)   cefTRIAXone (ROCEPHIN) 1 g vial to attach to  mL bag for ADULTS or NS 50 mL bag for PEDS (0 g Intravenous Stopped 10/14/24 0033)   haloperidol lactate (HALDOL) injection 5 mg (5 mg Intramuscular $Given 10/14/24 1455)   diphenhydrAMINE  (BENADRYL) injection 25 mg (25 mg Intramuscular $Given 10/14/24 8132)   haloperidol lactate (HALDOL) injection 5 mg (5 mg Intramuscular $Given 10/14/24 7804)   diphenhydrAMINE (BENADRYL) injection 25 mg (25 mg Intramuscular $Given 10/14/24 1902)         ADLs    Meal Provided this shift? No    Hygiene items provided? No    ADLs completed? No    Date of last shower: Unknown    Any significant events this shift? Yes. Details: Pt punched wall prior to writer taking over. Pt c/o right hand pain, swelling noted. Xray done which showed a fx at base of 5th metacarpal, splint placed. Pt took scheduled meds without difficulty.    Any information that would be helpful in caring for this patient?  N/A    Family present/updated? No    Location of patient's belongings: DEC office    Critical Care Minutes:  Does the patient need critical care minutes documented? No

## 2024-10-15 NOTE — TELEPHONE ENCOUNTER
R:    Notice of Intent to Revoke Provisional Discharge in RightFax received on10/15/2024 at 3:55:10 PM, placed in Folder labeled Court/Commitment/[place]/Hold.

## 2024-10-15 NOTE — ED PROVIDER NOTES
"Boarding note    Patient received in signout.  He is repeatedly requesting to be sedated and requesting \"B-52\".  Responding well to Haldol and Benadryl.     Despite this, patient continues to request medication for anxiety and to be sedated despite refusing all of his home psychiatric medications.  Patient has been boarding for nearly 50 hours and is making many requests without being willing to be flexible or compromising.    At 1 point, patient became agitated, punched a wall.  X-ray reveals boxer's fracture.  Will use gauze wrap and plaster splint to minimize sharp and harmful objects in order to end this fracture.  Is neurovascularly intact.  Splint applied as below.  Discussed need for orthopedic follow-up and need for cast to remain dry.      [facility]    Splint Application    Date/Time: 10/14/2024 10:02 PM    Performed by: Samantha Fuchs DO  Authorized by: Samantha Fuchs DO    Risks, benefits and alternatives discussed.      PRE-PROCEDURE DETAILS     Sensation:  Normal    PROCEDURE DETAILS     Laterality:  Right    Location:  Hand    Hand:  R hand    Strapping: no      Cast type:  Short arm    Splint type:  Ulnar gutter    Supplies:  Plaster    POST PROCEDURE DETAILS     Pain:  Improved    Sensation:  Normal      PROCEDURE    Patient Tolerance:  Patient tolerated the procedure well with no immediate complications               Samantha Fuchs DO  10/14/24 7813    "

## 2024-10-15 NOTE — PROGRESS NOTES
Left a VM for pts FLORIDA Alberto ([phone]) to inquire about the intent to revoke and to remind of the revocation order needing to be obtained by the end of the 72HH. 1259 Wed 10/16      RAYMUNDO Oropeza  [facility]  [phone]

## 2024-10-15 NOTE — PROGRESS NOTES
"Triage & Transition Services, Extended Care     Therapy Progress Note    Patient: Prakash goes by \"Prakash,\" uses he/him pronouns  Date of Service: October 15, 2024  Site of Service: [facility] EMERGENCY DEPT                             ED08  Patient was seen yes  Mode of Assessment: In person    Presentation Summary: Writer knocked and asked permission to meet.  Patient agreed to meeting, writer introduced self and explained role.  Patient was sitting up in a chair next to the gurney.  Patient had coloring books and magazines, and they were watching TV.  Patient explained their  visited today, they reviewed to intent to revoke PD, patient acknowledges they are currently under commitment and understands what happens with the process of revoking PD.  Patient showed writer the paperwork the  left outlining this, patient noted they understood and have no further questions.  Patient said he has been doing the \"wrong things\" which has led to presenting to the ED.  He said he stopped taking medications and stopped engaging in outpatient treatment and therapy.  Patient said for the last 2 months he has experienced SI everyday, he said he has a history of SI but having these thoughts everyday is not normal for him.  Patient said he moved from a  into his own apartment, he said this change very scary and he is not engaging as much, he reports isolating.  Patient has a wrap on his hand from punching the wall, he said he was angry and felt misunderstood.  Patient noted he is not typically and angry or aggressive person, he thinks going off of medications has contributed to increased agitation.  Patient said appetite and sleep have been normal.  Patient said the visit with his CM today was helpful, he was angry that his dog was at home alone, though he has now found someone to care for his pet, this has brought him some relief.    Therapeutic Intervention(s) Provided: Explored motivation " for behavioral change., Engaged in cognitive restructuring/ reframing, looked at common cognitive distortions and challenged negative thoughts., Coached on coping techniques/relaxation skills to help improve distress tolerance and managing intense emotions., Reviewed healthy living that supports positive mental health, including looking at sleep hygiene, regular movement, nutrition, and regular socialization., Provided positive reinforcement for progress towards goals, gains in knowledge, and application of skills previously taught.    Current Symptoms: anxious difficulty concentrating, impaired decision making, thoughts of death/suicide, apathy anxious impulsive, displaces blame, distractability, high risk behavior  (no eating symptoms reported today)    Mental Status Exam   Affect: Flat  Appearance: Disheveled  Attention Span/Concentration: Attentive  Eye Contact: Variable, Engaged    Fund of Knowledge: Appropriate   Language /Speech Content: Fluent  Language /Speech Volume: Normal  Language /Speech Rate/Productions: Normal  Recent Memory: Intact  Remote Memory: Variable  Mood: Depressed, Irritable, Sad  Orientation to Person: Yes   Orientation to Place: Yes  Orientation to Time of Day: Yes  Orientation to Date: Yes     Situation (Do they understand why they are here?): Yes  Psychomotor Behavior: Normal, Agitated  Thought Content: Suicidal  Thought Form: Obsessive/Perseverative    Treatment Objective(s) Addressed: assessing safety, identifying treatment goals, identifying additional supports, rapport building, processing feelings    Patient Response to Interventions: acceptance expressed, verbalizes understanding    Progress Towards Goals: Patient Reports Symptoms Are: ongoing  Patient Progress Toward Goals: is making progress  Comment: Pt refusing psych meds in ED  Next Step to Work Toward Discharge: symptom stabilization, patient ability to engage in safety planning, collaboration with OP team/family/friends,  means restriction  Symptom Stabilization Comment: Pt continuing to present with command AH and SI, unable to safety plan  Means Restriction: Patient is being referred to [facility] for safety, stabilization and treatment    Case Management: Case Management Included: collaborating with patient's support system  Details on Collaborating with Patient's Support System: Patient's  visited the patient in the ED.  Brought the patient some color books and magazines, assisted the patient in planning care for his pet at home.  CM also notified the patient of intent to revoke PD and left documents for his review.  Writer faxed a copy to the DEC fax.    Plan: inpatient mental health  yes RN RN- Lissett KOTHARI  yes    Clinical Substantiation: Continue to recommend [facility] admission for patient safety and stabilization due to acute mental health decompensation including SI, helplessness, hopelessness, apathy, and avoidance rendering patient unable to safely care for self outside of secure setting. Pt took overdose of medications and called 911, resulting in ED presentation. Medication nonadherence in community and refusing psychiatric medication in ED exacerbates risk. Commitment  submitted intent to revoke provisional discharge, awaiting Court Hold, anticipating to receive Court Hold tomorrow 10/15/24.  72HH continues while waiting Court Hold from [facility].    Legal Status: Legal Status at Admission: 72 Hour Hold  72 Hour Hold - Date/Time Initiated: 0120 Sun 10/13  72 Hour Hold - Date/Time Ends: 2359 Thurs 10/17    Session Status: Time session started: 1340  Time session ended: 1402  Session Duration (minutes): 22 minutes  Session Number: 1  Anticipated number of sessions or this episode of care: 4  Date of most recent diagnostic assessment:  (none found; reach out to [facility])    Time Spent: 22 minutes    CPT Code: CPT Codes: 31477 - Psychotherapy (with patient) - 30 (16-37*) min    Diagnosis:   Patient  Active Problem List   Diagnosis Code    ADHD (attention deficit hyperactivity disorder) F90.9    Bipolar 1 disorder, manic, mild F31.11    Marijuana abuse F12.10    Polysubstance abuse (H) F19.10    GERD (gastroesophageal reflux disease) K21.9    Tobacco abuse Z72.0    Intractable back pain M54.9    Optic neuritis H46.9    Cauda equina syndrome with neurogenic bladder (H) G83.4    Schizoaffective disorder, bipolar type (H) F25.0    PTSD (post-traumatic stress disorder) F43.10    Anxiety F41.9    Auditory hallucination R44.0    Nephrolithiasis N20.0    Cyst of left ovary N83.202    Borderline personality disorder (H) F60.3    Cannabis use disorder, severe, dependence (H) F12.20    Depression F32.A    Episodic mood disorder (H) F39    History of heroin abuse (H) F11.11    Opioid use disorder, severe, dependence (H) F11.20    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    Nausea R11.0    Urinary retention R33.9    Chronic bilateral low back pain without sciatica M54.50, G89.29    AVA (generalized anxiety disorder) F41.1    Aggressive behavior R46.89    Lumbosacral radiculopathy at L5 M54.17    Abnormal uterine bleeding N93.9    Bipolar affective disorder, mixed, severe, with psychotic behavior (H) F31.64    Akathisia G25.71    Hypertension, unspecified type I10    Female-to-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 person Z78.9    Morbid obesity (H) E66.01    Mood disorder due to a general medical condition F06.30    Acne vulgaris L70.0    Type 2 diabetes mellitus with hyperglycemia, with long-term current use of insulin (H) E11.65, Z79.4    Herpes labialis B00.1    Major depressive disorder, recurrent 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

## 2024-10-15 NOTE — TELEPHONE ENCOUNTER
R:  6:56 PM Received a call from resident Dr Cohen who states after review pt, feels he would not be appropriate for admission to [facility] for the following reasons: labs indicate pt may not be medically cleared and glucose is poorly controlled. Pt broke fist punching wall in the ED and unit cannot take pts who have exhibited violence in the last 36 hours. Also reports there is an issue with revocation of pt's PD and paperwork has not yet been received- nearing the end of pt's 72HH. Coordinator following informed.

## 2024-10-15 NOTE — TELEPHONE ENCOUNTER
"Author called ED RN Charmaine at 7:48 am to clarify when pt was last in restraints (appears to have been 10/13). Per notes, pt was punching walls yesterday. He is in a splint as he received a boxer's fracture. He has been refusing his meds. She said the last time he was in restraints was on the 13th.   Called Luz on #30 at 9:22 am and she said CRN is not available so she agreed to leave her a message asking her to review pt and to call back. Unit is case by case.  Per call at 12:05 pm to CRN Vicky, they are declining him due to feeling pt is attention seeking and saying they know him from prior admits and feel he is \"not a good fit.\"   Messaged Obdulia at 12:22 pm asking her to call intake re: escalation for pt.  Per Obdulia at 12:24 pm, she will discuss with CRN and will call back.   Per Obdulia at 12:31 pm, she will ask CRN to discuss case with provider and said she (Obdulia) will then call back.   Per Obdulia at 1:30 pm, #30 RN spoke with Darius and said that he supports the decline due to pt's recent restraint, dysregulation, and noncompliance with meds.   Paged Melissa at 1:37 pm.   Passed to sharon in shift handoff.          "

## 2024-10-15 NOTE — ED NOTES
RN ED Mental Health Handoff Note    Voluntary    Does patient require 1:1? Yes    Hold and rights been given and documented for patient: Yes    Is the patient in BH scrubs? No -own clothes    Has the patient been searched? Yes    Is the 15 minute observation tool up to date? Yes    Was patient issued a welcome folder? Yes    Room check completed this shift: Yes    PSS3 and [place] Assessment/Reassessment this shift:    C-SSRS ([place])      Date and Time Q1 Wished to be Dead (Past Month) Q2 Suicidal Thoughts (Past Month) Q3 Suicidal Thought Method Q4 Suicidal Intent without Specific Plan Q5 Suicide Intent with Specific Plan Q6 Suicide Behavior (Lifetime) If yes to Q6, within past 3 months? Level of Risk per Screen Level of Risk per Screen User   10/12/24 2356 -- -- -- -- -- 1-->yes -- -- -- PJB   10/12/24 2355 0-->no 0-->no -- -- -- -- -- -- no risks indicated PJB   10/12/24 2047 1-->yes 1-->yes 0-->no 0-->no 0-->no 1-->yes 0-->no -- moderate risk MS            Behavioral status of patient: Green    Code 21 called this shift? No    Use of restraints/seclusion this shift? No    Most recent vital signs:      BP: 132/89 Pulse: 86   Resp: 18 SpO2: 95 % O2 Device: None (Room air)      Medications:  Scheduled medication compliance? Yes    PRN Meds administered this shift? No    Medications   OLANZapine (zyPREXA) injection 10 mg ( Intramuscular Canceled Entry 10/13/24 0110)   nicotine (NICORETTE) gum 4 mg (4 mg Buccal $Given 10/14/24 2025)   OLANZapine (zyPREXA) injection 10 mg (10 mg Intramuscular $Given 10/14/24 1156)   acetaminophen (TYLENOL) tablet 325-650 mg (has no administration in time range)   amLODIPine (NORVASC) tablet 5 mg (5 mg Oral Not Given 10/14/24 1038)   ARIPiprazole (ABILIFY) tablet 10 mg (10 mg Oral Not Given 10/14/24 1038)   clonazePAM (klonoPIN) tablet 0.5 mg (0.5 mg Oral $Given 10/14/24 1124)   cyclobenzaprine (FLEXERIL) tablet 10 mg (10 mg Oral $Given 10/13/24 4007)   empagliflozin (JARDIANCE)  tablet 10 mg (10 mg Oral Not Given 10/14/24 1038)   ondansetron (ZOFRAN ODT) ODT tab 4 mg (has no administration in time range)   paliperidone (INVEGA) 24 hr tablet 1.5 mg (1.5 mg Oral Not Given 10/14/24 1039)   paliperidone ER (INVEGA) 24 hr tablet 3 mg (3 mg Oral Not Given 10/14/24 1039)   PARoxetine (PAXIL) tablet 20 mg (20 mg Oral Not Given 10/14/24 1040)   QUEtiapine (SEROquel) tablet 200 mg (200 mg Oral $Given 10/14/24 2136)   rosuvastatin (CRESTOR) tablet 40 mg (40 mg Oral Not Given 10/14/24 1040)   testosterone (ANDROGEL/TESTIM) 50 MG/5GM (1%) topical gel 100 mg of testosterone (100 mg of testosterone Transdermal Not Given 10/14/24 1040)   gabapentin (NEURONTIN) tablet 1,200 mg (1,200 mg Oral $Given 10/14/24 2031)   gabapentin (NEURONTIN) tablet 600 mg (600 mg Oral Not Given 10/14/24 1512)   cephALEXin (KEFLEX) capsule 500 mg (500 mg Oral $Given 10/14/24 2031)   hydrOXYzine HCl (ATARAX) tablet 25 mg (25 mg Oral $Given 10/14/24 1802)   ondansetron (ZOFRAN) injection 4 mg (4 mg Intravenous Not Given 10/13/24 0305)   ondansetron (ZOFRAN ODT) ODT tab 4 mg (4 mg Oral Not Given 10/13/24 0305)   OLANZapine (zyPREXA) injection 10 mg (10 mg Intramuscular $Given 10/13/24 0648)   OLANZapine (zyPREXA) injection 10 mg (10 mg Intramuscular $Given 10/13/24 0839)   haloperidol lactate (HALDOL) injection 10 mg (10 mg Intramuscular $Given 10/13/24 0926)   LORazepam (ATIVAN) injection 1 mg (1 mg Intravenous $Given 10/13/24 1644)   droPERidol (INAPSINE) injection 2.5 mg (2.5 mg Intravenous Not Given 10/13/24 1736)   haloperidol lactate (HALDOL) injection 10 mg (10 mg Intramuscular $Given 10/13/24 1803)   LORazepam (ATIVAN) injection 0.5 mg (0.5 mg Intravenous $Given 10/13/24 2026)   cefTRIAXone (ROCEPHIN) 1 g vial to attach to  mL bag for ADULTS or NS 50 mL bag for PEDS (0 g Intravenous Stopped 10/14/24 0033)   haloperidol lactate (HALDOL) injection 5 mg (5 mg Intramuscular $Given 10/14/24 1455)   diphenhydrAMINE  (BENADRYL) injection 25 mg (25 mg Intramuscular $Given 10/14/24 0666)   haloperidol lactate (HALDOL) injection 5 mg (5 mg Intramuscular $Given 10/14/24 1904)   diphenhydrAMINE (BENADRYL) injection 25 mg (25 mg Intramuscular $Given 10/14/24 1902)         ADLs    Meal Provided this shift? No    Hygiene items provided? No    ADLs completed? No    Date of last shower: unknown    Any significant events this shift? No    Any information that would be helpful in caring for this patient?  N/A.    Family present/updated? No    Location of patient's belongings: DEC office    Critical Care Minutes:  Does the patient need critical care minutes documented? No

## 2024-10-15 NOTE — TELEPHONE ENCOUNTER
02:07 - Per TORO Dale, declined at this time d/t pt acuity    R: [place] [facility] Access Inpatient Bed Call Log 10/15/2024 11:30PM   Intake has called facilities that have not updated the bed status within the last 12 hours.          (Adults)         [place]:                 [facility] is posting 0 beds.              [facility] is posting 0 beds. [phone]. - Per Nohemi @ 11:38PM, they are full     [facility] is posting 0 beds. Negative covid required.    [facility] is posting 0 beds. Neg covid. No high school/Deysi-psych. [phone]. - Per Lissett @ 11:40PM, they are full tonight   [facility] is posting 0 beds. [phone]    [facility] is posting 0 bed. [phone]     [facility] is posting 0 beds. (Ages 18-35) Negative covid. [phone].Declined 10/13 d/t acuity  [facility] is posting 0 beds.     [facility] ([facility] is posting 0 beds [phone]       [place]:   [facility] is posting 2 beds. LOW acuity ONLY. Mixed unit 12+. Negative covid- [phone]    [facility] has 1 bed posted. No aggression. Negative Covid. Low acuity. - Per Linh @ 11:41PM, they are at capacity for adults and adolescents   [facility] is posting 0 beds. Low acuity only. Neg covid.  [phone]     [facility] is posting 1 bed. Low acuity. No current aggression. - Per Linh @ 11:41PM, they are at capacity for adults and adolescents      [facility] is posting 0 beds. Negative covid. [phone].     [facility] ([place] is posting 0 beds available. Negative covid.  [phone].        [facility] is posting 1 bed. Low acuity. 72 HH hold preferred. Negative covid required. [phone].  *Does not review after 10PM*  [facility] ([place] is posting 2 beds. Low acuity only. Neg covid.  [phone].    [facility] [place] is posting 0 beds.  Negative covid required.  Vol only, No history  of aggression, violence, or assault. No sexual offenders. No 72 HH holds. [phone]        [facility] is posting 1 bed. Negative covid required.  (Must have the cognitive ability to do programming. No aggressive or violent behavior or recent HX in the last 2 yrs. MH must be primary.) Always low acuity. [phone]     [facility] has 0 beds posted. Negative covid required.  Low acuity only. Violence and aggression capped.  [phone]   [facility] is posting 2 beds. Low acuity, Negative covid required. [phone]. - Per Irene @ 11:42PM, [facility] Negative covid required.  [phone].  - Per Lesly @ 11:20PM, 3 SD/low acuity beds available  [facility] is posting 1 bed. Negative covid. LOW acuity. (No lines, drains, or tubes, oxygen, CPAP, IV, etc.) Must Have a Ride Home. [phone]. - Per staff @ 11:43PM, they are unable to take admissions tonight [facility] is posting 1 bed. Negative covid. (No. lines, drains, or tubes, oxygen, CPAP, IV, etc.) [phone]. - Per Sangeeta @ 11:44PM, no beds [facility] is posting 6 beds. No covid test required. OUT OF STATE. [phone]     Pt remains on the work list pending appropriate bed availability.

## 2024-10-15 NOTE — CONSULTS
"      Psychiatry Consultation; Follow up              Reason for Consult, requesting source:    Ashley paperwork completed and sent to [facility]. Awaiting response. See initial psychiatric consult for additional information.           [facility]  Consult/Liaison Psychiatry   [facility]    Please call the [facility] CL line ([phone]) with questions and to determine consult service coverage.   \"Much or all of the text in this note was generated through the use of Dragon Dictate voice to text software. Errors in spelling or words which appear to be out of contact are unintentional, may be present due having escaped editing\"          "

## 2024-10-15 NOTE — DISCHARGE INSTRUCTIONS
MENTAL HEALTH RESOURCES & SERVICES:   Behavioral Healthcare Providers Scheduling  During your hospitalization, you were seen by a licensed mental health professional [facility]  for a brief mental health assessment and/or psychotherapy at [facility].  It is recommended that you follow up with your established providers (psychiatrist, mental health therapist, and/or primary care doctor - as relevant) as soon as possible. Coordinators from [facility] will be calling you in the next 24-48 hours to ensure that you have the resources you need.  You can also contact [facility] coordinators directly at [phone].    You have been scheduled for the following mental health appointments:     Date: Thursday, 10/24/2024  Time: 2:00 pm - 3:00 pm  Provider: Raghu PHIPPS  LICSW  Location: [facility], [address], [place] [address]  Phone: [phone]  Type: Teletherapy    Patient instructions  For Telehealth we will need your paperwork before you are seen. Option to fill it out online but we'll need your email address. Email/fax/mail accepted.       [facility] maintains an extensive network of licensed behavioral health providers to connect patients with the services they need.  We do not charge providers a fee to participate in our referral network.  We match patients with providers based on a patient s specific needs, insurance coverage, and location.  Our first effort will be to refer you to a provider within your care system, and will utilize providers outside your care system as needed.

## 2024-10-16 ENCOUNTER — TELEPHONE (OUTPATIENT)
Dept: BEHAVIORAL HEALTH | Facility: CLINIC | Age: 34
End: 2024-10-16

## 2024-10-16 PROBLEM — R45.851 SUICIDAL IDEATION: Status: ACTIVE | Noted: 2024-10-16

## 2024-10-16 PROBLEM — T50.904A DRUG OVERDOSE OF UNDETERMINED INTENT, INITIAL ENCOUNTER: Status: ACTIVE | Noted: 2024-10-16

## 2024-10-16 PROBLEM — S62.346A CLOSED NONDISPLACED FRACTURE OF BASE OF FIFTH METACARPAL BONE OF RIGHT HAND, INITIAL ENCOUNTER: Status: ACTIVE | Noted: 2024-10-16

## 2024-10-16 PROBLEM — R19.7 DIARRHEA, UNSPECIFIED TYPE: Status: ACTIVE | Noted: 2024-10-16

## 2024-10-16 LAB — GLUCOSE BLDC GLUCOMTR-MCNC: 117 MG/DL (ref 70–99)

## 2024-10-16 PROCEDURE — 250N000011 HC RX IP 250 OP 636: Mod: JZ | Performed by: EMERGENCY MEDICINE

## 2024-10-16 PROCEDURE — 250N000013 HC RX MED GY IP 250 OP 250 PS 637: Performed by: EMERGENCY MEDICINE

## 2024-10-16 PROCEDURE — G0378 HOSPITAL OBSERVATION PER HR: HCPCS

## 2024-10-16 PROCEDURE — 250N000013 HC RX MED GY IP 250 OP 250 PS 637: Performed by: STUDENT IN AN ORGANIZED HEALTH CARE EDUCATION/TRAINING PROGRAM

## 2024-10-16 PROCEDURE — 250N000011 HC RX IP 250 OP 636: Performed by: INTERNAL MEDICINE

## 2024-10-16 PROCEDURE — 250N000013 HC RX MED GY IP 250 OP 250 PS 637: Performed by: SOCIAL WORKER

## 2024-10-16 PROCEDURE — 96372 THER/PROPH/DIAG INJ SC/IM: CPT | Performed by: INTERNAL MEDICINE

## 2024-10-16 PROCEDURE — 99222 1ST HOSP IP/OBS MODERATE 55: CPT | Mod: AI | Performed by: INTERNAL MEDICINE

## 2024-10-16 PROCEDURE — A9270 NON-COVERED ITEM OR SERVICE: HCPCS | Performed by: EMERGENCY MEDICINE

## 2024-10-16 PROCEDURE — 82962 GLUCOSE BLOOD TEST: CPT

## 2024-10-16 PROCEDURE — 96372 THER/PROPH/DIAG INJ SC/IM: CPT | Performed by: EMERGENCY MEDICINE

## 2024-10-16 RX ORDER — AMLODIPINE BESYLATE 10 MG/1
10 TABLET ORAL DAILY
Status: DISCONTINUED | OUTPATIENT
Start: 2024-10-17 | End: 2024-10-22 | Stop reason: HOSPADM

## 2024-10-16 RX ORDER — ONDANSETRON 2 MG/ML
4 INJECTION INTRAMUSCULAR; INTRAVENOUS EVERY 6 HOURS PRN
Status: DISCONTINUED | OUTPATIENT
Start: 2024-10-16 | End: 2024-10-22 | Stop reason: HOSPADM

## 2024-10-16 RX ORDER — GABAPENTIN 600 MG/1
1200 TABLET ORAL 2 TIMES DAILY
Status: DISCONTINUED | OUTPATIENT
Start: 2024-10-17 | End: 2024-10-22 | Stop reason: HOSPADM

## 2024-10-16 RX ORDER — ACETAMINOPHEN 325 MG/1
650 TABLET ORAL EVERY 4 HOURS PRN
Status: DISCONTINUED | OUTPATIENT
Start: 2024-10-16 | End: 2024-10-22 | Stop reason: HOSPADM

## 2024-10-16 RX ORDER — AMOXICILLIN 250 MG
1 CAPSULE ORAL 2 TIMES DAILY PRN
Status: DISCONTINUED | OUTPATIENT
Start: 2024-10-16 | End: 2024-10-22 | Stop reason: HOSPADM

## 2024-10-16 RX ORDER — ONDANSETRON 4 MG/1
4 TABLET, ORALLY DISINTEGRATING ORAL EVERY 6 HOURS PRN
Status: DISCONTINUED | OUTPATIENT
Start: 2024-10-16 | End: 2024-10-22 | Stop reason: HOSPADM

## 2024-10-16 RX ORDER — ENOXAPARIN SODIUM 100 MG/ML
40 INJECTION SUBCUTANEOUS EVERY 24 HOURS
Status: DISCONTINUED | OUTPATIENT
Start: 2024-10-16 | End: 2024-10-22 | Stop reason: HOSPADM

## 2024-10-16 RX ORDER — DEXTROSE MONOHYDRATE 25 G/50ML
25-50 INJECTION, SOLUTION INTRAVENOUS
Status: DISCONTINUED | OUTPATIENT
Start: 2024-10-16 | End: 2024-10-22 | Stop reason: HOSPADM

## 2024-10-16 RX ORDER — AMOXICILLIN 250 MG
2 CAPSULE ORAL 2 TIMES DAILY PRN
Status: DISCONTINUED | OUTPATIENT
Start: 2024-10-16 | End: 2024-10-22 | Stop reason: HOSPADM

## 2024-10-16 RX ORDER — ACETAMINOPHEN 650 MG/1
650 SUPPOSITORY RECTAL EVERY 4 HOURS PRN
Status: DISCONTINUED | OUTPATIENT
Start: 2024-10-16 | End: 2024-10-22 | Stop reason: HOSPADM

## 2024-10-16 RX ORDER — NICOTINE POLACRILEX 4 MG
15-30 LOZENGE BUCCAL
Status: DISCONTINUED | OUTPATIENT
Start: 2024-10-16 | End: 2024-10-22 | Stop reason: HOSPADM

## 2024-10-16 RX ORDER — SODIUM CHLORIDE 9 MG/ML
INJECTION, SOLUTION INTRAVENOUS CONTINUOUS
Status: DISCONTINUED | OUTPATIENT
Start: 2024-10-16 | End: 2024-10-19

## 2024-10-16 RX ORDER — LOPERAMIDE HYDROCHLORIDE 2 MG/1
4 CAPSULE ORAL ONCE
Status: COMPLETED | OUTPATIENT
Start: 2024-10-16 | End: 2024-10-16

## 2024-10-16 RX ADMIN — ROSUVASTATIN CALCIUM 40 MG: 20 TABLET, FILM COATED ORAL at 08:44

## 2024-10-16 RX ADMIN — PALIPERIDONE 1.5 MG: 1.5 TABLET, EXTENDED RELEASE ORAL at 09:48

## 2024-10-16 RX ADMIN — CLONAZEPAM 0.5 MG: 0.5 TABLET ORAL at 12:39

## 2024-10-16 RX ADMIN — AMLODIPINE BESYLATE 5 MG: 5 TABLET ORAL at 08:44

## 2024-10-16 RX ADMIN — ACETAMINOPHEN 650 MG: 325 TABLET, FILM COATED ORAL at 10:48

## 2024-10-16 RX ADMIN — NICOTINE POLACRILEX 2 MG: 2 GUM, CHEWING ORAL at 10:46

## 2024-10-16 RX ADMIN — OLANZAPINE 10 MG: 10 INJECTION, POWDER, FOR SOLUTION INTRAMUSCULAR at 15:43

## 2024-10-16 RX ADMIN — NICOTINE POLACRILEX 2 MG: 2 GUM, CHEWING ORAL at 14:21

## 2024-10-16 RX ADMIN — PALIPERIDONE 3 MG: 3 TABLET, EXTENDED RELEASE ORAL at 08:45

## 2024-10-16 RX ADMIN — CLONAZEPAM 0.5 MG: 0.5 TABLET ORAL at 20:06

## 2024-10-16 RX ADMIN — HYDROXYZINE HYDROCHLORIDE 25 MG: 25 TABLET ORAL at 23:49

## 2024-10-16 RX ADMIN — ACETAMINOPHEN 650 MG: 325 TABLET, FILM COATED ORAL at 02:06

## 2024-10-16 RX ADMIN — EMPAGLIFLOZIN 10 MG: 10 TABLET, FILM COATED ORAL at 08:45

## 2024-10-16 RX ADMIN — LOPERAMIDE HYDROCHLORIDE 4 MG: 2 CAPSULE ORAL at 12:04

## 2024-10-16 RX ADMIN — GABAPENTIN 1200 MG: 600 TABLET, FILM COATED ORAL at 20:07

## 2024-10-16 RX ADMIN — NICOTINE POLACRILEX 2 MG: 2 GUM, CHEWING ORAL at 17:23

## 2024-10-16 RX ADMIN — NICOTINE POLACRILEX 2 MG: 2 GUM, CHEWING ORAL at 12:23

## 2024-10-16 RX ADMIN — CYCLOBENZAPRINE 10 MG: 10 TABLET, FILM COATED ORAL at 11:36

## 2024-10-16 RX ADMIN — GABAPENTIN 1200 MG: 600 TABLET, FILM COATED ORAL at 08:43

## 2024-10-16 RX ADMIN — PAROXETINE HYDROCHLORIDE 20 MG: 20 TABLET, FILM COATED ORAL at 08:45

## 2024-10-16 RX ADMIN — ACETAMINOPHEN 650 MG: 325 TABLET, FILM COATED ORAL at 20:07

## 2024-10-16 RX ADMIN — QUETIAPINE 200 MG: 200 TABLET, FILM COATED ORAL at 22:31

## 2024-10-16 RX ADMIN — NICOTINE POLACRILEX 2 MG: 2 GUM, CHEWING ORAL at 15:43

## 2024-10-16 RX ADMIN — GABAPENTIN 600 MG: 600 TABLET, FILM COATED ORAL at 15:02

## 2024-10-16 RX ADMIN — TESTOSTERONE 100 MG OF TESTOSTERONE: 50 GEL TRANSDERMAL at 09:59

## 2024-10-16 RX ADMIN — NICOTINE POLACRILEX 4 MG: 2 GUM, CHEWING ORAL at 00:47

## 2024-10-16 RX ADMIN — NICOTINE POLACRILEX 4 MG: 2 GUM, CHEWING ORAL at 05:27

## 2024-10-16 RX ADMIN — ENOXAPARIN SODIUM 40 MG: 40 INJECTION SUBCUTANEOUS at 22:30

## 2024-10-16 RX ADMIN — CEPHALEXIN 500 MG: 500 CAPSULE ORAL at 08:43

## 2024-10-16 RX ADMIN — CYCLOBENZAPRINE 10 MG: 10 TABLET, FILM COATED ORAL at 22:31

## 2024-10-16 RX ADMIN — NICOTINE POLACRILEX 2 MG: 2 GUM, CHEWING ORAL at 09:24

## 2024-10-16 RX ADMIN — ARIPIPRAZOLE 10 MG: 10 TABLET ORAL at 08:45

## 2024-10-16 ASSESSMENT — ACTIVITIES OF DAILY LIVING (ADL)
ADLS_ACUITY_SCORE: 39
ADLS_ACUITY_SCORE: 39
ADLS_ACUITY_SCORE: 24
ADLS_ACUITY_SCORE: 39
ADLS_ACUITY_SCORE: 24
ADLS_ACUITY_SCORE: 39
ADLS_ACUITY_SCORE: 24
ADLS_ACUITY_SCORE: 39
ADLS_ACUITY_SCORE: 39

## 2024-10-16 NOTE — ED NOTES
Pt states they they feel that they are escalating due to the new of not being able to be transferred until 48hr loose stool free. Pt requested an IM medication to help with his escalating emotions. Updated MD. MD headed to bedside. This RN to grab PRN IM zyprexa.

## 2024-10-16 NOTE — TELEPHONE ENCOUNTER
10:58 AM: Intake paged Mikel to present  for station 20.    12:39 PM: Second page sent for review.    12:48 PM: Pt declined for station 20 d/t unit acuity and Pt acuity. Unit has aggressive pts on unit and cannot accommodate.    12:56 PM: Intake called Michelle to escalate decline.      1:27 PM: Intake called IP    2:00 PM: Pt was tested yesterday. Pt meets exclusionary criteria and would need to have 48 hrs without loose stools.    2:25 PM: Michelle called and confirmed that Pt is experiencing loose stools and meets exclusionary criteria.    2:44 PM: Intake called [facility] ED to provide update to RN that Pt meets exclusionary criteria and will be removed from the work list. ED can call to place Pt on list.    2:51 PM: Pt moved to discharge tab. Intake no longer following.        R: [place]  Access Inpatient Bed Call Log 10/16/2024 @ 0830:  Intake has called facilities that have not updated the bed status within the last 12 hours.           (Adults)        [facility] is posting 0 beds.             [facility] is posting 0 beds. [phone]:   [facility] is posting 0 beds. Negative covid required.   [facility] is posting 0 beds. Neg covid. No high school/Deysi-psych. [phone]    [facility] is posting 1 bed. [phone]  Low acuity.   [facility] is posting 0 beds. [phone]   [facility] is posting  3 bed. (Ages 18-35) Negative covid. [phone]; Low acuity only.   [facility] is posting  0  bed.  Low acuity only.  [phone]    [facility] ([facility] is posting  1  beds [phone]      [facility] is posting  6  beds. LOW acuity ONLY. Mixed unit 12+. Negative covid- [phone];   [facility] has 0 beds posted. No aggression. Negative Covid. Low acuity.   [facility] is posting 0 bed. Low acuity only. Neg covid.  [phone];   [facility] is posting 0 beds. Low acuity. No current aggression.     [facility] is posting 0 beds. Negative  covid. [phone]. Ext [phone];   [facility] ([place] is posting 0 beds available. Negative covid.  [phone];      [facility] is posting 1 bed. Low acuity. 72 HH hold preferred. Negative covid required. [phone].    [facility] is posting  1  beds. Low acuity only. Neg covid.  [phone];   [facility] [place] is posting 0 beds.  Negative covid required.   Vol only, No history of aggression, violence, or assault. No sexual offenders. No 72 HH holds. [phone]        [facility] is posting 0 beds. Negative covid required.  (Must have the cognitive ability to do programming. No aggressive or violent behavior or recent HX in the last 2 yrs. MH must be primary.) Always low acuity. [phone]    [facility] has 1 bed posted. Negative covid required.  Low acuity only. Violence and aggression capped.  [phone]  [facility] is posting 2 beds. Low acuity, Negative covid required. [phone];   [facility] is posting 3 beds. Negative covid required.  [phone].   [facility] [place] is posting 1 bed. Negative covid. LOW acuity. (No lines, drains, or tubes, oxygen, CPAP, IV, etc.) Must Have a Ride Home. [phone]  [facility] is posting 1 bed. Negative covid. (No. lines, drains, or tubes, oxygen, CPAP, IV, etc.) [phone]     Pt remains on the work list pending appropriate bed availability.

## 2024-10-16 NOTE — ED NOTES
"[facility]  ED Nurse Handoff Report    ED Chief complaint: Drug Overdose  . ED Diagnosis:   Final diagnoses:   Drug overdose of undetermined intent, initial encounter   Closed nondisplaced fracture of base of fifth metacarpal bone of right hand, initial encounter   Diarrhea, unspecified type   Suicidal ideation       Allergies:   Allergies   Allergen Reactions    Haldol [Haloperidol] Other (See Comments)     Makes patient very angry and anxious    Adhesive Tape Hives    Prednisone Other (See Comments) and Hives     Suicidal ideation    Droperidol Anxiety       Code Status: Full Code    Activity level - Baseline/Home:  independent.  Activity Level - Current:   independent.   Lift room needed: No.   Bariatric: No   Needed: No   Isolation: Yes.   Infection: Not Applicable  C-Diff (Clostridium Difficile) diagnosis.     Respiratory status: Room air    Vital Signs (within 30 minutes):   Vitals:    10/14/24 1040 10/14/24 2137 10/15/24 0905 10/15/24 1815   BP: (!) 144/90 132/89 (!) 144/97 (!) 133/110   Pulse: 93 86 94 89   Resp: 18  18 22   Temp:   97.6  F (36.4  C) 97.7  F (36.5  C)   TempSrc:   Oral Oral   SpO2: 98% 95% 94% 92%   Weight:    101.7 kg (224 lb 3.3 oz)   Height:    1.702 m (5' 7\")       Cardiac Rhythm:  ,      Pain level:    Patient confused: No.   Patient Falls Risk: patient and family education.   Elimination Status: Has voided     Patient Report - Initial Complaint: Drug overdose.   Focused Assessment: Prakash Prasad is a 34 year old adult with history of Bipolar 1 disorder, ADHD, borderline personality disorder, type 2 diabetes mellitus, TBI, polysubstance abuse, and previous suicide attempt who presents to the ED via EMS for evaluation of a drug overdose. Nurse present reports Prakash took 20 400 mg gabapentin and 10 Klonopin about 2 hours ago. Patient stated this was not a suicide attempt, but he was endorsing suicidal ideation earlier. He took the pills tonight because " "he \"needed to relax\" and felt stressed. Prakash does not remember when he took the pills. He called 911 due to feeling lightheaded. . History of suicide attempt \"a long time ago.\" Denies alcohol use. Patient lives alone.          Pt with hx of Cdiff. Since yesterday x4 episodes of watery diarrhea. Cdif antigen positive Toxin negative. Inpatient Psych will not accept pt until 48hr diarrhea free.      Abnormal Results:   Labs Ordered and Resulted from Time of ED Arrival to Time of ED Departure   COMPREHENSIVE METABOLIC PANEL - Abnormal       Result Value    Sodium 137      Potassium 3.8      Carbon Dioxide (CO2) 22      Anion Gap 15      Urea Nitrogen 11.1      Creatinine 0.76      GFR Estimate >90      Calcium 9.9      Chloride 100      Glucose 141 (*)     Alkaline Phosphatase 88      AST 52 (*)     ALT 52      Protein Total 8.3      Albumin 5.0      Bilirubin Total 0.3     ACETAMINOPHEN LEVEL - Abnormal    Acetaminophen <5.0 (*)    CBC WITH PLATELETS AND DIFFERENTIAL - Abnormal    WBC Count 14.6 (*)     RBC Count 5.70      Hemoglobin 15.9      Hematocrit 47.6      MCV 84      MCH 27.9      MCHC 33.4      RDW 14.1      Platelet Count 295      % Neutrophils 65      % Lymphocytes 30      % Monocytes 4      % Eosinophils 0      % Basophils 0      % Immature Granulocytes 0      NRBCs per 100 WBC 0      Absolute Neutrophils 9.5 (*)     Absolute Lymphocytes 4.3      Absolute Monocytes 0.6      Absolute Eosinophils 0.0      Absolute Basophils 0.1      Absolute Immature Granulocytes 0.1      Absolute NRBCs 0.0     ROUTINE UA WITH MICROSCOPIC REFLEX TO CULTURE - Abnormal    Color Urine Light Yellow      Appearance Urine Clear      Glucose Urine >=1000 (*)     Bilirubin Urine Negative      Ketones Urine Trace (*)     Specific Gravity Urine 1.028      Blood Urine Negative      pH Urine 6.0      Protein Albumin Urine Negative      Urobilinogen Urine Normal      Nitrite Urine Negative      Leukocyte Esterase Urine Large (*)  "    Mucus Urine Present (*)     RBC Urine 10 (*)     WBC Urine 42 (*)     Squamous Epithelials Urine 3 (*)     Transitional Epithelials Urine 3 (*)     Hyaline Casts Urine 1     URINE DRUG SCREEN PANEL - Abnormal    Amphetamines Urine Screen Negative      Barbituates Urine Screen Negative      Benzodiazepine Urine Screen Negative      Cannabinoids Urine Screen Positive (*)     Cocaine Urine Screen Negative      Fentanyl Qual Urine Screen Negative      Opiates Urine Screen Negative      PCP Urine Screen Negative     GLUCOSE BY METER - Abnormal    GLUCOSE BY METER POCT 118 (*)    C. DIFFICILE TOXIN B PCR WITH REFLEX TO C. DIFFICILE ANTIGEN AND TOXINS A/B EIA - Abnormal    C Difficile Toxin B by PCR Positive (*)    C. DIFFICILE ANTIGEN AND TOXINS A/B BY ENZYME IMMUNOASSAY - Abnormal    C. difficile GDH Antigen Positive (*)     C. difficile Toxin Negative     INR - Normal    INR 1.01     ETHYL ALCOHOL LEVEL - Normal    Alcohol ethyl <0.01     SALICYLATE LEVEL - Normal    Salicylate <0.3     URINE CULTURE    Culture 10,000-50,000 CFU/mL Mixture of Urogenital Little          XR Hand Right G/E 3 Views   Final Result   IMPRESSION: Nondisplaced fracture of the base of the fifth metacarpal. Significant surrounding soft tissue swelling. Right hand otherwise negative.          Treatments provided: see MAR  Family Comments: no one at bedside  OBS brochure/video discussed/provided to patient:  Yes  ED Medications:   Medications   OLANZapine (zyPREXA) injection 10 mg ( Intramuscular Canceled Entry 10/13/24 0110)   nicotine (NICORETTE) gum 4 mg (2 mg Buccal $Given 10/16/24 1723)   OLANZapine (zyPREXA) injection 10 mg (10 mg Intramuscular $Given 10/16/24 1543)   acetaminophen (TYLENOL) tablet 325-650 mg (650 mg Oral $Given 10/16/24 1048)   amLODIPine (NORVASC) tablet 5 mg (5 mg Oral $Given 10/16/24 0844)   ARIPiprazole (ABILIFY) tablet 10 mg (10 mg Oral $Given 10/16/24 0845)   clonazePAM (klonoPIN) tablet 0.5 mg (0.5 mg Oral $Given  10/16/24 1239)   cyclobenzaprine (FLEXERIL) tablet 10 mg (10 mg Oral $Given 10/16/24 1136)   empagliflozin (JARDIANCE) tablet 10 mg (10 mg Oral $Given 10/16/24 0845)   ondansetron (ZOFRAN ODT) ODT tab 4 mg (has no administration in time range)   paliperidone (INVEGA) 24 hr tablet 1.5 mg (1.5 mg Oral $Given 10/16/24 0948)   paliperidone ER (INVEGA) 24 hr tablet 3 mg (3 mg Oral $Given 10/16/24 0845)   PARoxetine (PAXIL) tablet 20 mg (20 mg Oral $Given 10/16/24 0845)   QUEtiapine (SEROquel) tablet 200 mg (200 mg Oral $Given 10/15/24 2332)   rosuvastatin (CRESTOR) tablet 40 mg (40 mg Oral $Given 10/16/24 0844)   testosterone (ANDROGEL/TESTIM) 50 MG/5GM (1%) topical gel 100 mg of testosterone (100 mg of testosterone Transdermal $Given 10/16/24 0959)   gabapentin (NEURONTIN) tablet 1,200 mg (1,200 mg Oral $Given 10/16/24 0843)   gabapentin (NEURONTIN) tablet 600 mg (600 mg Oral $Given 10/16/24 1502)   hydrOXYzine HCl (ATARAX) tablet 25 mg (25 mg Oral $Given 10/14/24 1802)   ondansetron (ZOFRAN) injection 4 mg (4 mg Intravenous Not Given 10/13/24 0305)   ondansetron (ZOFRAN ODT) ODT tab 4 mg (4 mg Oral Not Given 10/13/24 0305)   OLANZapine (zyPREXA) injection 10 mg (10 mg Intramuscular $Given 10/13/24 0648)   OLANZapine (zyPREXA) injection 10 mg (10 mg Intramuscular $Given 10/13/24 0839)   haloperidol lactate (HALDOL) injection 10 mg (10 mg Intramuscular $Given 10/13/24 0926)   LORazepam (ATIVAN) injection 1 mg (1 mg Intravenous $Given 10/13/24 1644)   droPERidol (INAPSINE) injection 2.5 mg (2.5 mg Intravenous Not Given 10/13/24 1736)   haloperidol lactate (HALDOL) injection 10 mg (10 mg Intramuscular $Given 10/13/24 1803)   LORazepam (ATIVAN) injection 0.5 mg (0.5 mg Intravenous $Given 10/13/24 2026)   cefTRIAXone (ROCEPHIN) 1 g vial to attach to  mL bag for ADULTS or NS 50 mL bag for PEDS (0 g Intravenous Stopped 10/14/24 0033)   haloperidol lactate (HALDOL) injection 5 mg (5 mg Intramuscular $Given 10/14/24 1455)    diphenhydrAMINE (BENADRYL) injection 25 mg (25 mg Intramuscular $Given 10/14/24 1455)   haloperidol lactate (HALDOL) injection 5 mg (5 mg Intramuscular $Given 10/14/24 1904)   diphenhydrAMINE (BENADRYL) injection 25 mg (25 mg Intramuscular $Given 10/14/24 1902)   haloperidol lactate (HALDOL) injection 5 mg (5 mg Intramuscular $Given 10/15/24 1154)   diphenhydrAMINE (BENADRYL) injection 50 mg (50 mg Intramuscular $Given 10/15/24 1154)   loperamide (IMODIUM) capsule 4 mg (4 mg Oral $Given 10/16/24 1204)       Drips infusing:  No  For the majority of the shift this patient was yellow.   Interventions performed were verbal descalation, medications. 1:1 bedside attendant     Sepsis treatment initiated: No    Cares/treatment/interventions/medications to be completed following ED care: stool management. Psych med management    ED Nurse Name: Nevin Thomas RN  5:38 PM

## 2024-10-16 NOTE — TELEPHONE ENCOUNTER
12:44 PM: Petition for Commitment Paperwork received in [employer] on 10/16/2024 at 12:41:28 PM placed in [employer]ax Folder labeled Court/Commitment/Ashley/Hold.

## 2024-10-16 NOTE — ED PROVIDER NOTES
Took over care of the patient at handoff.    Nurse informed me that the patient was having watery diarrhea and has a history of C. difficile, she sent a stool sample and ordered C. difficile testing.    Patient is C. difficile positive.  Vancomycin is ordered.  The rest of the testing comes back, and his antigen is positive, but his toxin is negative.    On evaluation of the patient, he is not having any abdominal pain, fevers.  He has had less than 5 bowel movements today. Mary has reported one BM tonight.    I did cancel the vancomycin.  Will monitor stool output and symptoms.  I think that the patient is probably colonized with C. Difficile.     Patient is handed off to Dr. Sheppard.     Vesna Dial MD  10/15/24 [id number]

## 2024-10-16 NOTE — PROGRESS NOTES
"Triage & Transition Services, Extended Care     Therapy Progress Note    Patient: Prakash goes by \"Prakash,\" uses he/him pronouns  Date of Service: October 16, 2024  Site of Service: [facility] EMERGENCY DEPT                             ED08  Patient was seen yes  Mode of Assessment: In person    Presentation Summary: Writer knocked and asked permission to meet, patient was agreeable to meeting, entered the patient's room, introduced self and explained role.  Patient recalled writer from the day before, he said he wanted to speak with writer because he learned last night he has a C Diff infection and he asked if this will affect or change the plan of care.  Patient noted this happened once before and instead of IP MH placement, he had a medical admission with psychiatric services.  He asked if this is something that will happen again.  Patient was sitting up on a chair next to the gurney, his legs were bouncing as if he was restless, though he remained calm and engaged throughout the encounter.  Patient said despite having difficulties with extended boarding status in the ED, he has been making efforts to stay calm and remain cooperative.  Writer explained I had already reached out to leadership, Supervisor Sherley Moura, who asked placement about how new infection will impact search.  Writer consulted with Supervisor Sherley Moura, who checked with placement.  No changes to plan of care at this time.  Patient will remain on IP MH admission placement list.    Therapeutic Intervention(s) Provided: Explored motivation for behavioral change., Engaged in cognitive restructuring/ reframing, looked at common cognitive distortions and challenged negative thoughts., Coached on coping techniques/relaxation skills to help improve distress tolerance and managing intense emotions., Reviewed healthy living that supports positive mental health, including looking at sleep hygiene, regular movement, nutrition, and regular " socialization., Provided positive reinforcement for progress towards goals, gains in knowledge, and application of skills previously taught.    Current Symptoms: anxious difficulty concentrating, impaired decision making, thoughts of death/suicide, apathy anxious impulsive, displaces blame, distractability, high risk behavior  (no eating symptoms reported today)    Mental Status Exam   Affect: Flat  Appearance: Disheveled  Attention Span/Concentration: Attentive  Eye Contact: Variable, Engaged    Fund of Knowledge: Appropriate   Language /Speech Content: Fluent  Language /Speech Volume: Normal  Language /Speech Rate/Productions: Normal  Recent Memory: Intact  Remote Memory: Variable  Mood: Depressed, Irritable, Sad  Orientation to Person: Yes   Orientation to Place: Yes  Orientation to Time of Day: Yes  Orientation to Date: Yes     Situation (Do they understand why they are here?): Yes  Psychomotor Behavior: Normal, Agitated  Thought Content: Suicidal  Thought Form: Goal Directed, Obsessive/Perseverative    Treatment Objective(s) Addressed: assessing safety, identifying treatment goals, identifying additional supports, rapport building, processing feelings    Patient Response to Interventions: acceptance expressed, verbalizes understanding    Progress Towards Goals: Patient Reports Symptoms Are: ongoing  Patient Progress Toward Goals: is making progress  Comment: Pt refusing psych meds in ED  Next Step to Work Toward Discharge: symptom stabilization, patient ability to engage in safety planning, collaboration with OP team/family/friends, means restriction  Symptom Stabilization Comment: Pt continuing to present with command AH and SI, unable to safety plan  Means Restriction: Patient is being referred to [facility] for safety, stabilization and treatment    Case Management: Case Management Included: collaborating with patient's support system  Details on Collaborating with Patient's Support System: Patient's   sent signed Court Hold from [facility], patient is now under Court Hold    Plan: inpatient mental health  yes RN RN- Galina HANSEN  yes    Clinical Substantiation: Continue to recommend IP MH admission for pt safety and stabilization due to acute mental health decompensation including SI, helplessness, hopelessness, apathy, and avoidance rendering pt unable to safely care for self outside of secure setting. Pt took overdose of medications and called 911, resulting in ED presentation. Medication nonadherence in community and refusing psychiatric medication in ED exacerbates risk. Commitment  sent intent to revoke provisional discharge yesterday. [facility] provided signed court hold today, patient is now under Court Hold.    4:20pm- update:  PPT reviewed and reported patient will have to be without loose stool for 48 hours and then call PPT to see if he can be placed back on the wait list.  Discussed with MD Grullon, asked about possible medical admission with psychiatry services with [facility] and Liaison, she agreed to review with Hospitalist to see if appropriate.      Legal Status: Legal Status at Admission: Court Hold  72 Hour Hold - Date/Time Initiated: 0120 Sun 10/13  72 Hour Hold - Date/Time Ends: 2359 Thurs 10/17  Type of Court Hold: revocation of provisional discharge    Session Status: Time session started: 1330  Time session ended: 1347  Session Duration (minutes): 17 minutes  Session Number: 2  Anticipated number of sessions or this episode of care: 4  Date of most recent diagnostic assessment:  (none found; reach out to [facility])    Time Spent: 17 minutes    CPT Code: CPT Codes: 34485 - Psychotherapy (with patient) - 30 (16-37*) min    Diagnosis:   Patient Active Problem List   Diagnosis Code    ADHD (attention deficit hyperactivity disorder) F90.9    Bipolar 1 disorder, manic, mild F31.11    Marijuana abuse F12.10    Polysubstance abuse (H) F19.10    GERD (gastroesophageal reflux  disease) K21.9    Tobacco abuse Z72.0    Intractable back pain M54.9    Optic neuritis H46.9    Cauda equina syndrome with neurogenic bladder (H) G83.4    Schizoaffective disorder, bipolar type (H) F25.0    PTSD (post-traumatic stress disorder) F43.10    Anxiety F41.9    Auditory hallucination R44.0    Nephrolithiasis N20.0    Cyst of left ovary N83.202    Borderline personality disorder (H) F60.3    Cannabis use disorder, severe, dependence (H) F12.20    Depression F32.A    Episodic mood disorder (H) F39    History of heroin abuse (H) F11.11    Opioid use disorder, severe, dependence (H) F11.20    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    Nausea R11.0    Urinary retention R33.9    Chronic bilateral low back pain without sciatica M54.50, G89.29    AVA (generalized anxiety disorder) F41.1    Aggressive behavior R46.89    Lumbosacral radiculopathy at L5 M54.17    Abnormal uterine bleeding N93.9    Bipolar affective disorder, mixed, severe, with psychotic behavior (H) F31.64    Akathisia G25.71    Hypertension, unspecified type I10    Female-to-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 person Z78.9    Morbid obesity (H) E66.01    Mood disorder due to a general medical condition F06.30    Acne vulgaris L70.0    Type 2 diabetes mellitus with hyperglycemia, with long-term current use of insulin (H) E11.65, Z79.4    Herpes labialis B00.1    Major depressive disorder, recurrent severe without psychotic features (H) F33.2    Flank pain R10.9    Mood disorder (H) F39       Primary Problem This Admission:       *Schizoaffective disorder, bipolar type (H) F25.0      Borderline personality disorder (H) F60.3      ADHD (attention deficit hyperactivity disorder) F90.9    JULISA Borges   Licensed Mental Health Professional (LMHP), [facility]  [phone]

## 2024-10-16 NOTE — H&P
"[facility]    History and Physical - Hospitalist Service       Date of Admission:  10/12/2024    Assessment & Plan      Prakash Prasad is a 34 year old adult admitted on 10/12/2024.    Acute diarrhea  In a patient who was on antibiotics recently and a month ago  Patient had about 4-5 stools today which were loose  The C. difficile antigen is positive with C. difficile toxin being negative  At this time we will monitor the diarrhea and ideally not treated  Consult ID for advise regarding treatment   Give IV Fluids bolus and maintenance fluids    Type 2 diabetes mellitus  Will check sugars 4 times daily and give sliding scale insulin  Continue empagliflozin 10 mg daily    Suicidal ideation  Schizoaffective disorder  Borderline personality disorder  Depression  PTSD  TBI  Management as per psychiatry  Psychiatry is consulted    GERD  Will not treat GERD at this time with no symptoms    Hypertension  Continue amlodipine 5 mg daily but increase the dose to 10 mg daily as the blood pressure is still elevated at 144/100    Fracture base of right fifth metacarpal  Pain management    Diet: Regular Diet Adult    DVT Prophylaxis: Enoxaparin (Lovenox) SQ  Styles Catheter: Not present  Lines: None     Cardiac Monitoring: None  Code Status:  Full code    Clinically Significant Risk Factors Present on Admission                   # Hypertension: Noted on problem list        # DMII: A1C = 7.4 % (Ref range: 0.0 - 5.6 %) within past 6 months    # Obesity: Estimated body mass index is 35.12 kg/m  as calculated from the following:    Height as of this encounter: 1.702 m (5' 7\").    Weight as of this encounter: 101.7 kg (224 lb 3.3 oz).       # Financial/Environmental Concerns:           Disposition Plan     Medically Ready for Discharge: in 1-2 days when accepted by Psychiatry inpatient            Osbaldo Davis MD  Hospitalist Service  [facility]  Securely message with Omer (more " info)  Text page via [facility] Paging/Directory     ______________________________________________________________________    Chief Complaint   Acute diarrhea    History is obtained from the patient, medical records and my discussion with emergency department physician      History of Present Illness   Prakash Prasad is a 34 year old adult with ADHD, bipolar 1 disorder, schizoaffective disorder,  borderline personality disorder, depression, type 2 diabetes mellitus, GERD, history of TBI, hypertension, obesity, substance abuse in the past with methamphetamine heroin but not in remission except takes medicinal marijuana according to her, PTSD who was admitted to [facility] on 10/12/2024 after drug overdose with 20 pills of 400 mg gabapentin and 10 Klonopin few hours before arriving which  was a suicidal attempt following which patient called 911 for having a feeling of lightheadedness.  The blood sugar was 156.    Subsequently patient was to go to inpatient psychiatry unit with history of suicidal ideation and was awaiting transfer.  Patient was diagnosed with UTI with pyuria with 42 WBCs and large leukocyte esterase in the urine for which  was started on ceftriaxone 10/13/2024.   Patient tells me that 2 days prior to admission to the hospital he has had nausea vomiting with diarrhea.  Which returned/got worse 10/16/2024 with loose stools.  C. difficile was checked and was positive for C. difficile antigen but negative for C. difficile toxin.  There is no fever or chills patient has crampy abdominal discomfort.  Denies any nausea or vomiting at this time though was present before admission.  Of note is the fact that patient says that patient had taken antibiotics about a month ago but does not remember the name of the antibiotic.     Patient was awaiting placement and was in the emergency department since admission on 10/12/2024 and on 10/14/2024 patient had punched the wall and sustained a nondisplaced  fracture of the base of the fifth metacarpal.         Past Medical History    Past Medical History:   Diagnosis Date    ADHD (attention deficit hyperactivity disorder)     Bipolar 1 disorder     Borderline personality disorder     Cauda equina syndrome     Chronic low back pain     Depression     Diabetes mellitus, type 2 (H) 1/19/2023    GERD (gastroesophageal reflux disease)     h/o TBI (traumatic brain injury)     Hypertension, unspecified type 12/16/2021    Marginal corneal ulcer, left 07/17/2015    Nephrolithiasis     obesity     Polysubstance abuse - methamphetamine, hallucinagen, heroin, marijuana     currently in remission    PONV (postoperative nausea and vomiting)     PTSD (post-traumatic stress disorder)        Past Surgical History   Past Surgical History:   Procedure Laterality Date    BACK SURGERY  12/24/2016    BACK SURGERY - For Cauda Equina Syndrome  12/24/2016    COLONOSCOPY      COMBINED CYSTOSCOPY, INSERT STENT URETER(S) Left 8/30/2018    Procedure: COMBINED CYSTOSCOPY, INSERT STENT URETER(S);  Cystoscopy With Left Stent Placement;  Surgeon: Kiran Ulrich MD;  Location: [facility]    COMBINED CYSTOSCOPY, RETROGRADES, EXCHANGE STENT URETER(S) Left 10/14/2018    Procedure: COMBINED CYSTOSCOPY, RETROGRADES, EXCHANGE STENT URETER(S);  Cystoscopy and removal of left-sided stent.;  Surgeon: Stiven Olivo MD;  Location: [facility] OR    COMBINED CYSTOSCOPY, RETROGRADES, URETEROSCOPY, INSERT STENT  1/6/2014    Procedure: COMBINED CYSTOSCOPY, RETROGRADES, URETEROSCOPY, INSERT STENT;  Cystyoscopy place left ureteral stent;  Surgeon: Jaun Kimble MD;  Location: [facility] OR    COMBINED CYSTOSCOPY, RETROGRADES, URETEROSCOPY, INSERT STENT Left 10/23/2018    Procedure: Video cystoscopy, left ureteroscopy with stone extraction;  Surgeon: Bull Mast MD;  Location: [facility] OR    CYSTOSCOPY, URETEROSCOPY, COMBINED Right 9/23/2015    Procedure: COMBINED CYSTOSCOPY, URETEROSCOPY;  Surgeon: ROME Jett  MD Kwame;  Location: [facility] OR    ENT SURGERY      ESOPHAGOSCOPY, GASTROSCOPY, DUODENOSCOPY (EGD), COMBINED  4/8/2013    Procedure: COMBINED ESOPHAGOSCOPY, GASTROSCOPY, DUODENOSCOPY (EGD), BIOPSY SINGLE OR MULTIPLE;  Gastroscopy;  Surgeon: Peter Pickard MD;  Location: [facility]    ESOPHAGOSCOPY, GASTROSCOPY, DUODENOSCOPY (EGD), COMBINED Left 10/28/2014    Procedure: COMBINED ESOPHAGOSCOPY, GASTROSCOPY, DUODENOSCOPY (EGD), BIOPSY SINGLE OR MULTIPLE;  Surgeon: Narcisa Ramirez MD;  Location:  OR    ESOPHAGOSCOPY, GASTROSCOPY, DUODENOSCOPY (EGD), COMBINED N/A 12/24/2018    Procedure: COMBINED ESOPHAGOSCOPY, GASTROSCOPY, DUODENOSCOPY (EGD), BIOPSY SINGLE OR MULTIPLE;  Surgeon: Sonu Verduzco MD;  Location: [facility]    INJECT EPIDURAL TRANSFORAMINAL LUMBAR / SACRAL EA ADDITIONAL LEVEL Left 3/18/2021    Procedure: Left L5/S1 transforaminal injection for selective L5 nerve root block;  Surgeon: Eliza Pagan MD;  [place] [facility] [place]    LAPAROSCOPIC CHOLECYSTECTOMY  11/20/2014    [facility], Dr. Ramirez    LASER HOLMIUM LITHOTRIPSY URETER(S), INSERT STENT, COMBINED  4/2/2014    Procedure: COMBINED CYSTOSCOPY, URETEROSCOPY, LASER HOLMIUM LITHOTRIPSY URETER(S), INSERT STENT;  Cystoscopy,Left Ureteral Stent Removal,Left Ureteroscopy with Laser Lithotripsy,Left Ureter Stent Placement;  Surgeon: ROME Jett MD;  Location: [facility] OR    Transurethral stone resection  03/11/2011       Prior to Admission Medications   Prior to Admission Medications   Prescriptions Last Dose Informant Patient Reported? Taking?   ACE/ARB/ARNI NOT PRESCRIBED (INTENTIONAL)   Yes No   Sig: Please choose reason not prescribed from choices below.   ARIPiprazole (ABILIFY) 10 MG tablet Past Month at am  No Yes   Sig: Take 1 tablet (10 mg) by mouth every morning.   PARoxetine (PAXIL) 20 MG tablet Past Month at am  No Yes   Sig: Take 1 tablet (20 mg) by mouth daily.   QUEtiapine (SEROQUEL) 200 MG tablet Past Month at hs  No Yes   Sig: Take 1 tablet  (200 mg) by mouth at bedtime   Semaglutide (RYBELSUS) 7 MG tablet Past Month at am  No Yes   Sig: Take 1 tablet (7 mg) by mouth daily.   acetaminophen (TYLENOL) 325 MG tablet prn at prn  Yes Yes   Sig: Take 325-650 mg by mouth every 6 hours as needed for mild pain.   amLODIPine (NORVASC) 5 MG tablet Past Month at am  No Yes   Sig: Take 1 tablet (5 mg) by mouth daily.   blood glucose (NO BRAND SPECIFIED) test strip   No No   Sig: Use to test blood sugar one times daily and as needed. To accompany: Blood Glucose Monitor Brands: per insurance.   clindamycin phos-benzoyl perox (DUAC) 1.2-5 % external gel Past Month  No Yes   Sig: Apply topically daily.   clonazePAM (KLONOPIN) 0.5 MG tablet 10/12/2024  No Yes   Sig: Take 1 tablet (0.5 mg) by mouth 2 times daily as needed for anxiety.   cyclobenzaprine (FLEXERIL) 10 MG tablet prn at prn  No Yes   Sig: Take 1 tablet (10 mg) by mouth every 8 hours as needed for muscle spasms.   empagliflozin (JARDIANCE) 10 MG TABS tablet Past Month at am  No Yes   Sig: Take 1 tablet (10 mg) by mouth daily   gabapentin (NEURONTIN) 600 MG tablet 10/12/2024  No Yes   Sig: Take 2 tablets (1200 mg) morning and evening, 1 tablet mid-day (600 mg)   insulin pen needle (32G X 4 MM) 32G X 4 MM miscellaneous   No No   Sig: Use 1 pen needles daily or as directed.   naloxone (NARCAN) 4 MG/0.1ML nasal spray prn at prn  No Yes   Sig: Spray 1 spray (4 mg) into one nostril alternating nostrils as needed for opioid reversal. every 2-3 minutes until assistance arrives   ondansetron (ZOFRAN ODT) 4 MG ODT tab prn at prn  No Yes   Sig: Take 1 tablet (4 mg) by mouth every 8 hours as needed for nausea.   oxyCODONE (ROXICODONE) 5 MG tablet prn at prn  No Yes   Sig: Take 1-2 tablets (5-10 mg) by mouth every 6 hours as needed for severe pain.   oxyCODONE (ROXICODONE) 5 MG tablet   No No   Sig: Take 1 tablet (5 mg) by mouth every 6 hours as needed for pain.   oxyCODONE (ROXICODONE) 5 MG tablet   No No   Sig: Take 1  tablet (5 mg) by mouth every 6 hours as needed for pain.   paliperidone (INVEGA) 1.5 MG 24 hr tablet Past Month at am  Yes Yes   Sig: Take 1.5 mg by mouth every morning. Take with 3mg tablet for total dose of 4.5mg   paliperidone ER (INVEGA) 3 MG 24 hr tablet Past Month at am  Yes Yes   Sig: Take 3 mg by mouth every morning. Take with 1.5 mg tablet for total dose of 4.5mg   rosuvastatin (CRESTOR) 40 MG tablet Past Month at am  No Yes   Sig: Take 1 tablet (40 mg) by mouth daily   testosterone (ANDROGEL/TESTIM) 50 MG/5GM (1%) topical gel Past Month at am  No Yes   Sig: Place 2 packets (100 mg of testosterone) onto the skin daily   thin (NO BRAND SPECIFIED) lancets   No No   Sig: Use with lanceting device to check blood sugars once per day. To accompany: Blood Glucose Monitor Brands: per insurance.   traZODone (DESYREL) 50 MG tablet prn at prn  No Yes   Sig: Take 1 tablet (50 mg) by mouth nightly as needed for sleep.   valACYclovir (VALTREX) 1000 mg tablet prn at prn  No Yes   Sig: Take 2 tablets (2,000 mg) by mouth 2 times daily   Patient taking differently: Take 2,000 mg by mouth 2 times daily as needed.      Facility-Administered Medications: None        Review of Systems    Denies any headache, blurring of vision or double vision, neck pain jaw pain or shoulder pain, chest pain, shortness of breath, cough or increased sputum production, nausea or vomiting, has crampy abdominal pain, diarrhea .  Denies any urinary symptoms of burning or increased frequency. Denies any weakness of upper or lower extremities or any seizure activity. Fever or chills. Bleeding from anywhere else.  No rashes or cellulitis.      Social History   I have reviewed this patient's social history and updated it with pertinent information if needed.  Social History     Tobacco Use    Smoking status: Former     Current packs/day: 0.00     Average packs/day: 0.5 packs/day for 11.1 years (5.6 ttl pk-yrs)     Types: Other, Cigarettes     Start date:  "2011     Quit date: 2022     Years since quittin.0     Passive exposure: Never    Smokeless tobacco: Former     Types: Chew     Quit date: 2019    Tobacco comments:     Just nicotine gum currently. 23   Vaping Use    Vaping status: Every Day    Substances: Nicotine, THC, Flavoring    Devices: Disposable   Substance Use Topics    Alcohol use: Yes    Drug use: Not Currently     Types: Marijuana, Opiates     Comment: denies using oxy or other opiates_\"not for years\"         Family History   I have reviewed this patient's family history and updated it with pertinent information if needed.  Family History   Problem Relation Age of Onset    Hyperlipidemia Mother     Mental Illness Mother     Anxiety Disorder Mother     Anesthesia Reaction Mother         Vomiting/Nausea    Other Cancer Mother     Skin Cancer Mother     Gastrointestinal Disease Father         Crohn's Disease    Cancer Father         Liver Cancer    Other Cancer Father         Liver    Obesity Father     Skin Cancer Father     No Known Problems Sister     Hypertension Brother     Other Cancer Brother         Esophagecial    Diabetes Brother     Obesity Brother     Hypertension Brother     Other Cancer Brother     Cancer Maternal Grandmother         lympoma    Diabetes Maternal Grandmother     Mental Illness Maternal Grandmother     Other Cancer Maternal Grandmother         Non Hodgkins Lymphoma    Diabetes Maternal Grandfather     Hypertension Maternal Grandfather     Prostate Cancer Maternal Grandfather     Hyperlipidemia Maternal Grandfather     Heart Disease Paternal Grandfather         heart disease    Other Cancer Paternal Half-Brother          Allergies   Allergies   Allergen Reactions    Haldol [Haloperidol] Other (See Comments)     Makes patient very angry and anxious    Adhesive Tape Hives    Prednisone Other (See Comments) and Hives     Suicidal ideation    Droperidol Anxiety        Physical Exam   Vital Signs: Temp: 97.7  F " (36.5  C) Temp src: Oral BP: (!) 133/110 Pulse: 89   Resp: 22 SpO2: 92 % O2 Device: None (Room air)    Weight: 224 lbs 3.33 oz      GENERAL: Patient does not look in any acute distress  HEENT: EOM+, Conjunctiva is clear   NECK:  no Jugular Venous distention  HEART: S1 S2 regular rate and rhythm  LUNGS: Respirations are  not laboured, Lungs are clear to auscultation without Crepitations or Wheezing   ABDOMEN: Soft, there is no tenderness,  Bowel Sounds are Positive   LOWER LIMBS: no Pedal Edema  Bilaterally right upper extremity in a dressing wrap  CNS:  Alert,  Oriented x 3, Moving all the Four Limbs           Data   Imaging results reviewed over the past 24 hrs:   No results found for this or any previous visit (from the past 24 hour(s)).  Most Recent 3 CBC's:  Recent Labs   Lab Test 10/12/24  2056 09/26/24  0814 09/25/24  0717   WBC 14.6* 9.7 9.9   HGB 15.9 14.1 15.5   MCV 84 87 85    239 264     Most Recent 3 BMP's:  Recent Labs   Lab Test 10/15/24  1124 10/12/24  2055 09/27/24  0759 09/26/24  1224 09/26/24  0814 09/25/24  0752 09/25/24  0717   NA  --  137  --   --  136  --  137   POTASSIUM  --  3.8  --   --  4.2  --  4.0   CHLORIDE  --  100  --   --  104  --  105   CO2  --  22  --   --  22  --  23   BUN  --  11.1  --   --  16.6  --  18.2   CR  --  0.76  --   --  0.76  --  0.89   ANIONGAP  --  15  --   --  10  --  9   WILLIAMS  --  9.9  --   --  9.4  --  8.9   * 141* 144*   < > 218*   < > 149*    < > = values in this interval not displayed.     Most Recent 2 LFT's:  Recent Labs   Lab Test 10/12/24  2055 09/23/24  0046   AST 52* 38   ALT 52 45   ALKPHOS 88 80   BILITOTAL 0.3 0.2     Most Recent 6 Bacteria Isolates From Any Culture (See EPIC Reports for Culture Details):  Recent Labs   Lab Test 01/18/19  1538 12/07/18  1203 11/29/18  1644 10/13/18  1705 01/27/17  0935   CULT No beta hemolytic Streptococcus Group A isolated No beta hemolytic Streptococcus Group A isolated No beta hemolytic Streptococcus  Group A isolated 50,000 to 100,000 colonies/mL  mixed urogenital elgin  Susceptibility testing not routinely done   >100,000 colonies/mL Coagulase negative Staphylococcus  10,000 to 50,000 colonies/mL Pseudomonas aeruginosa  *     Most Recent Urinalysis:  Recent Labs   Lab Test 10/12/24  2347 12/10/20  1526 11/13/20  1455   COLOR Light Yellow   < > Yellow   APPEARANCE Clear   < > Slightly Cloudy   URINEGLC >=1000*   < > Negative   URINEBILI Negative   < > Negative   URINEKETONE Trace*   < > Negative   SG 1.028   < > 1.015   UBLD Negative   < > Trace*   URINEPH 6.0   < > 6.5   PROTEIN Negative   < > Negative   UROBILINOGEN  --   --  0.2   NITRITE Negative   < > Negative   LEUKEST Large*   < > Trace*   RBCU 10*   < > 2-5*   WBCU 42*   < > 0 - 5    < > = values in this interval not displayed.      Latest Reference Range & Units 10/15/24 18:14   C Difficile Toxin B by PCR Negative  Positive !   C. difficile GDH Antigen Negative  Positive !   C. Difficile Toxin Negative  Negative   !: Data is abnormal

## 2024-10-16 NOTE — ED NOTES
Pt updated that he will not be accepted to a mental health unit until he is without watery stool for 48 hours. Pt verbalizes understanding.

## 2024-10-16 NOTE — ED NOTES
Pt states that's he was feeling a little more anxious and was wondering if he could have something. RN gave PRN per request.

## 2024-10-16 NOTE — ED PROVIDER NOTES
Patient feeling agitated.  RN will give ordered PRN zyprexa  Pt states he has had 3-4 loose stools today.  Patient has to be 48 hours free of loose stools before accepted for psych placement.  Hx of C. Dif.  Per patient last episode of C. Dif was in 2020.  Pt was recently treated for a UTI.  Suspected UTI based on UA collected at start of stay.  Pt was started on keflex  UC showed mixed elgin.  Will discontinue keflex.  DIscussed positive C. Dif with ID Dr. Veiyra.  C dif result likely represents colonization but if ongoing and/or increasing stools in the next 24 hour, could consider empiric treatment for C. Dif.    Discussed in detail with Callie from DEC.  The facility that it initially then consider replacement Kenalog to take the patient secondary to diarrhea.  The require at least 48 hours free of diarrhea prior to reconsideration of putting patient on the list for admission.  Patient is feeling very anxious about not being able to transfer to psychiatry.  He did request some additional medication and his Zyprexa was given with good effect.  Patient has been calm and cooperative in ED since his active episode of agitation Sunday night into Monday.    Given of the need for further medical clearance in the setting of his diarrhea there was consideration given to admission for completion of medical clearance, inpatient psychiatry consultation and further evaluation as seen fit by the inpatient team.  I discussed with  in detail the patient's ED course, complications with placement and need for clearance this of infection in the setting of diarrhea.  At this time, I did not feel the patient required IV fluids as he is drinking well and has had only a few stools today.  I consulted with Dr. Lovett as noted above.  Dr. Gutierrez agreed to admission for further evaluation of options for placement and medical clearance in the setting of diarrhea.  Patient noted to the observation unit.    Eden Grullon MD        Eden Grullon MD  10/16/24 8713       Eden Grullon MD  10/16/24 2105

## 2024-10-16 NOTE — ED NOTES
IP MH Referral Acuity Rating Score (RARS)     LMHP complete at referral to IP MH, with [facility]; and, daily while awaiting IP MH placement. Call score to PPS.  CRITERIA SCORING   New 72 HH and Involuntary for IP MH (not adolescent) 1/1   Boarding over 24 hours 1/1   Vulnerable adult at least 55+ with multiple co morbidities; or, Patient age 11 or under 0/1   Suicide ideation without relief of precipitating factors 1/1   Current plan for suicide 0/1   Current plan for homicide 0/1   Imminent risk or actual attempt to seriously harm another without relief of factors precipitating the attempt 1/1   Severe dysfunction in daily living (ex: complete neglect for self care, extreme disruption in vegetative function, extreme deterioration in social interactions) 1/1   Recent (last 2 weeks) or current physical aggression in the ED 1/1   Restraints or seclusion episode in ED 1/1   Verbal aggression, agitation, yelling, etc., while in the ED 1/1   Active psychosis with psychomotor agitation or catatonia 0/1   Need for constant or near constant redirection (from leaving, from others, etc).  0/1   Intrusive or disruptive behaviors 1/1   TOTAL Acuity Total Score: 9

## 2024-10-16 NOTE — TELEPHONE ENCOUNTER
[place]: [place]  Access Inpatient Bed Call Log  10/16/2024 12:09 AM  Intake has called facilities that have not updated their bed status within the last 12 hours.??      ADULTS:     *[place]  [place] -- [facility]: @ Cap per website.  [place] -- [facility]:  @ Cap per website.   [place] -- [facility]: @ Cap per website.  [place] -- [facility]: @ Cap per website. - 12:12 AM Per Sonu, they are capped.  [place] -- [facility]: @ Cap per website.  [facility] -- [facility]: @ Cap per website.  [place] [facility] @ Cap per website. Ages 18-35, Voluntary only, COVID test req'd, NO aggression, physical or sexual assault, violence hx or drug abuse, or psychosis - 12:12 AM Per Kaylie, they have two beds and they are under review.  [place] -- [facility]: @ Cap per website.  [place] -- [facility]: @ cap per website.  [place] -- [facility]:  @ Cap per website.     *[facility]: @ Posting 5 beds. Mixed unit/Low acuity only.   : @ Cap per website. Low acuity, No aggression  [facility]: @ cap per website.  [facility]:  @ Cap per website. Low acuity only. No current aggression.  [facility]:  @ Posting 1 bed. COVID negative test req. Lower acuity only. No Aggression. -Per Ham @ 1:11AM, 1 low acuity bed  [facility]: @ Cap per website. Low acuity only. Prefer med adjustments placement.  . No aggression  [facility]:  @ Posting 1 bed. COVID negative test req. Lower acuity only. No Aggression. -Per [place] @ 1:11AM, 2 very low acuity beds available  [place] -- [facility]: @ Posting 1 bed. NO reviews after 10PM. Low acuity only.   [place] -- [facility]: @ Cap per website. No hx of aggression. No sexual offenders. Voluntary patients only  [place] -- [facility]:  @ Cap per website. Low acuity only. Must have the cognitive ability to do programming. No aggressive or violent behavior or recent HX in the last 2 yrs.  must be  primary.   [place] -- [facility]: @ Posting 1 bed. COVID negative test. Must be low acuity ONLY.  [place] -- [facility]: @ Cap per website.  Low acuity. Negative Covid. -12:19 AM Per Irene, they are capped.   [place] -- [facility]: @Posting 3 beds. Negative COVID test. - Declined 10/13 and 10/15 d/t acuity  [place] -[facility]: @ Posting 1 beds. No hx of aggression/assault. No lines, drains or tubes. Does not provide detox or CD treatment. - Per Irene @ 1:26AM, they are unable to review overnight; suggests calling back after 8AM  [place] -- [facility]: @ Posting 1 beds. Mixed unit/Low acuity/no medical devices - IV, CPAP etc. Negative Covid.). -Per [place] @ 1:26AM, 1 general unit/low acuity bed available                                                                                                                                 * [place] -- [facility]: Posting 12 beds. Out-of-State Facility. Per Intake policy, patients must be voluntary for placement in [place] - pt is on a 72 HH     Pt remains on waitlist pending appropriate placement availability.

## 2024-10-16 NOTE — ED NOTES
Patient placement called and due to concerns of infection prevention the pt is not eligable for inpatient due to C diff history and currently having loose stools. He needs to be free from having loose stool for 48 hours and then we can call pt placement again to determine if he would be able to be placed.

## 2024-10-17 LAB
ANION GAP SERPL CALCULATED.3IONS-SCNC: 17 MMOL/L (ref 7–15)
BUN SERPL-MCNC: 12.9 MG/DL (ref 6–20)
CALCIUM SERPL-MCNC: 9.1 MG/DL (ref 8.8–10.4)
CHLORIDE SERPL-SCNC: 104 MMOL/L (ref 98–107)
CREAT SERPL-MCNC: 0.66 MG/DL (ref 0.51–1.17)
EGFRCR SERPLBLD CKD-EPI 2021: >90 ML/MIN/1.73M2
ERYTHROCYTE [DISTWIDTH] IN BLOOD BY AUTOMATED COUNT: 14.3 % (ref 10–15)
GLUCOSE BLDC GLUCOMTR-MCNC: 102 MG/DL (ref 70–99)
GLUCOSE BLDC GLUCOMTR-MCNC: 120 MG/DL (ref 70–99)
GLUCOSE BLDC GLUCOMTR-MCNC: 141 MG/DL (ref 70–99)
GLUCOSE BLDC GLUCOMTR-MCNC: 154 MG/DL (ref 70–99)
GLUCOSE BLDC GLUCOMTR-MCNC: 175 MG/DL (ref 70–99)
GLUCOSE SERPL-MCNC: 185 MG/DL (ref 70–99)
HCO3 SERPL-SCNC: 18 MMOL/L (ref 22–29)
HCT VFR BLD AUTO: 46 % (ref 35–53)
HGB BLD-MCNC: 15 G/DL (ref 11.7–17.7)
MCH RBC QN AUTO: 27.9 PG (ref 26.5–33)
MCHC RBC AUTO-ENTMCNC: 32.6 G/DL (ref 31.5–36.5)
MCV RBC AUTO: 86 FL (ref 78–100)
PLATELET # BLD AUTO: 244 10E3/UL (ref 150–450)
POTASSIUM SERPL-SCNC: 4.3 MMOL/L (ref 3.4–5.3)
RBC # BLD AUTO: 5.37 10E6/UL (ref 3.8–5.9)
SODIUM SERPL-SCNC: 139 MMOL/L (ref 135–145)
WBC # BLD AUTO: 7.5 10E3/UL (ref 4–11)

## 2024-10-17 PROCEDURE — 250N000013 HC RX MED GY IP 250 OP 250 PS 637: Performed by: EMERGENCY MEDICINE

## 2024-10-17 PROCEDURE — 250N000013 HC RX MED GY IP 250 OP 250 PS 637: Performed by: STUDENT IN AN ORGANIZED HEALTH CARE EDUCATION/TRAINING PROGRAM

## 2024-10-17 PROCEDURE — 80048 BASIC METABOLIC PNL TOTAL CA: CPT | Performed by: INTERNAL MEDICINE

## 2024-10-17 PROCEDURE — 250N000011 HC RX IP 250 OP 636: Performed by: INTERNAL MEDICINE

## 2024-10-17 PROCEDURE — 82962 GLUCOSE BLOOD TEST: CPT

## 2024-10-17 PROCEDURE — 250N000013 HC RX MED GY IP 250 OP 250 PS 637: Performed by: INTERNAL MEDICINE

## 2024-10-17 PROCEDURE — 99222 1ST HOSP IP/OBS MODERATE 55: CPT | Performed by: INTERNAL MEDICINE

## 2024-10-17 PROCEDURE — 120N000001 HC R&B MED SURG/OB

## 2024-10-17 PROCEDURE — 258N000003 HC RX IP 258 OP 636: Performed by: INTERNAL MEDICINE

## 2024-10-17 PROCEDURE — G0378 HOSPITAL OBSERVATION PER HR: HCPCS

## 2024-10-17 PROCEDURE — 96375 TX/PRO/DX INJ NEW DRUG ADDON: CPT

## 2024-10-17 PROCEDURE — A9270 NON-COVERED ITEM OR SERVICE: HCPCS | Performed by: EMERGENCY MEDICINE

## 2024-10-17 PROCEDURE — 250N000013 HC RX MED GY IP 250 OP 250 PS 637: Performed by: HOSPITALIST

## 2024-10-17 PROCEDURE — 99232 SBSQ HOSP IP/OBS MODERATE 35: CPT

## 2024-10-17 PROCEDURE — 36415 COLL VENOUS BLD VENIPUNCTURE: CPT | Performed by: INTERNAL MEDICINE

## 2024-10-17 PROCEDURE — 99232 SBSQ HOSP IP/OBS MODERATE 35: CPT | Performed by: INTERNAL MEDICINE

## 2024-10-17 PROCEDURE — 85027 COMPLETE CBC AUTOMATED: CPT | Performed by: INTERNAL MEDICINE

## 2024-10-17 RX ORDER — NICOTINE 21 MG/24HR
1 PATCH, TRANSDERMAL 24 HOURS TRANSDERMAL DAILY
Status: DISCONTINUED | OUTPATIENT
Start: 2024-10-17 | End: 2024-10-22 | Stop reason: HOSPADM

## 2024-10-17 RX ORDER — VANCOMYCIN HYDROCHLORIDE 125 MG/1
125 CAPSULE ORAL 4 TIMES DAILY
Status: DISCONTINUED | OUTPATIENT
Start: 2024-10-17 | End: 2024-10-22 | Stop reason: HOSPADM

## 2024-10-17 RX ADMIN — GABAPENTIN 1200 MG: 600 TABLET, FILM COATED ORAL at 09:22

## 2024-10-17 RX ADMIN — ENOXAPARIN SODIUM 40 MG: 40 INJECTION SUBCUTANEOUS at 20:04

## 2024-10-17 RX ADMIN — HYDROXYZINE HYDROCHLORIDE 25 MG: 25 TABLET ORAL at 18:06

## 2024-10-17 RX ADMIN — HYDROXYZINE HYDROCHLORIDE 25 MG: 25 TABLET ORAL at 10:08

## 2024-10-17 RX ADMIN — PAROXETINE HYDROCHLORIDE 20 MG: 20 TABLET, FILM COATED ORAL at 09:23

## 2024-10-17 RX ADMIN — SODIUM CHLORIDE: 9 INJECTION, SOLUTION INTRAVENOUS at 00:21

## 2024-10-17 RX ADMIN — AMLODIPINE BESYLATE 10 MG: 10 TABLET ORAL at 09:23

## 2024-10-17 RX ADMIN — VANCOMYCIN HYDROCHLORIDE 125 MG: 125 CAPSULE ORAL at 16:08

## 2024-10-17 RX ADMIN — NICOTINE 1 PATCH: 14 PATCH, EXTENDED RELEASE TRANSDERMAL at 06:13

## 2024-10-17 RX ADMIN — TESTOSTERONE 100 MG OF TESTOSTERONE: 50 GEL TRANSDERMAL at 09:25

## 2024-10-17 RX ADMIN — GABAPENTIN 1200 MG: 600 TABLET, FILM COATED ORAL at 20:04

## 2024-10-17 RX ADMIN — NICOTINE POLACRILEX 4 MG: 2 GUM, CHEWING ORAL at 06:22

## 2024-10-17 RX ADMIN — ONDANSETRON 4 MG: 4 TABLET, ORALLY DISINTEGRATING ORAL at 18:06

## 2024-10-17 RX ADMIN — NICOTINE POLACRILEX 4 MG: 2 GUM, CHEWING ORAL at 20:04

## 2024-10-17 RX ADMIN — ROSUVASTATIN CALCIUM 40 MG: 20 TABLET, FILM COATED ORAL at 09:23

## 2024-10-17 RX ADMIN — ACETAMINOPHEN 650 MG: 325 TABLET, FILM COATED ORAL at 06:09

## 2024-10-17 RX ADMIN — CLONAZEPAM 0.5 MG: 0.5 TABLET ORAL at 13:32

## 2024-10-17 RX ADMIN — SODIUM CHLORIDE: 9 INJECTION, SOLUTION INTRAVENOUS at 13:32

## 2024-10-17 RX ADMIN — CLONAZEPAM 0.5 MG: 0.5 TABLET ORAL at 09:22

## 2024-10-17 RX ADMIN — CYCLOBENZAPRINE 10 MG: 10 TABLET, FILM COATED ORAL at 22:12

## 2024-10-17 RX ADMIN — PALIPERIDONE 3 MG: 3 TABLET, EXTENDED RELEASE ORAL at 09:25

## 2024-10-17 RX ADMIN — PALIPERIDONE 1.5 MG: 1.5 TABLET, EXTENDED RELEASE ORAL at 09:24

## 2024-10-17 RX ADMIN — VANCOMYCIN HYDROCHLORIDE 125 MG: 125 CAPSULE ORAL at 11:53

## 2024-10-17 RX ADMIN — ACETAMINOPHEN 650 MG: 325 TABLET, FILM COATED ORAL at 20:09

## 2024-10-17 RX ADMIN — ONDANSETRON 4 MG: 2 INJECTION INTRAMUSCULAR; INTRAVENOUS at 08:25

## 2024-10-17 RX ADMIN — VANCOMYCIN HYDROCHLORIDE 125 MG: 125 CAPSULE ORAL at 20:04

## 2024-10-17 RX ADMIN — NICOTINE POLACRILEX 4 MG: 2 GUM, CHEWING ORAL at 10:15

## 2024-10-17 RX ADMIN — EMPAGLIFLOZIN 10 MG: 10 TABLET, FILM COATED ORAL at 09:23

## 2024-10-17 RX ADMIN — ACETAMINOPHEN 650 MG: 325 TABLET, FILM COATED ORAL at 00:30

## 2024-10-17 RX ADMIN — NICOTINE POLACRILEX 4 MG: 2 GUM, CHEWING ORAL at 13:32

## 2024-10-17 RX ADMIN — ARIPIPRAZOLE 10 MG: 10 TABLET ORAL at 09:24

## 2024-10-17 RX ADMIN — NICOTINE POLACRILEX 4 MG: 2 GUM, CHEWING ORAL at 11:56

## 2024-10-17 RX ADMIN — SODIUM CHLORIDE 500 ML: 9 INJECTION, SOLUTION INTRAVENOUS at 00:19

## 2024-10-17 RX ADMIN — QUETIAPINE 200 MG: 200 TABLET, FILM COATED ORAL at 22:12

## 2024-10-17 RX ADMIN — NICOTINE POLACRILEX 4 MG: 2 GUM, CHEWING ORAL at 16:08

## 2024-10-17 ASSESSMENT — ACTIVITIES OF DAILY LIVING (ADL)
ADLS_ACUITY_SCORE: 24
DEPENDENT_IADLS:: CLEANING;COOKING;LAUNDRY;MEAL PREPARATION
ADLS_ACUITY_SCORE: 24

## 2024-10-17 NOTE — CONSULTS
Care Management Initial Consult    General Information  Assessment completed with: Care Team Member, ADEEL-chart review, [facility] NP  Type of CM/SW Visit: Initial Assessment    Primary Care Provider verified and updated as needed: Yes   Readmission within the last 30 days:        Reason for Consult: discharge planning  Advance Care Planning: Advance Care Planning Reviewed: no concerns identified          Communication Assessment  Patient's communication style: spoken language (English or Bilingual)    Hearing Difficulty or Deaf: no   Wear Glasses or Blind: yes    Cognitive  Cognitive/Neuro/Behavioral: WDL  Level of Consciousness: lethargic  Arousal Level: arouses to voice  Orientation: disoriented to, time, situation, place  Mood/Behavior: withdrawn, hypoactive (quiet, withdrawn)     Speech: slurred (slow and slightly slurred)    Living Environment:   People in home: alone     Current living Arrangements: apartment      Able to return to prior arrangements:  (unable to assess)       Family/Social Support:  Care provided by: other (see comments)  Provides care for: no one  Marital Status: Single  Support system: PCA (mental health team)          Description of Support System: Supportive, Involved         Current Resources:   Patient receiving home care services: No        Community Resources: County Worker, County Programs, PCA,   Equipment currently used at home: none  Supplies currently used at home: None    Employment/Financial:  Employment Status: other (see comments)        Financial Concerns: none   Referral to Financial Worker: No     Lifestyle & Psychosocial Needs:  Social Determinants of Health     Food Insecurity: Low Risk  (10/16/2024)    Food Insecurity     Within the past 12 months, did you worry that your food would run out before you got money to buy more?: No     Within the past 12 months, did the food you bought just not last and you didn t have money to get more?: No   Depression: Not at  risk (8/7/2024)    PHQ-2     PHQ-2 Score: 0   Housing Stability: High Risk (10/16/2024)    Housing Stability     Do you have housing? : No     Are you worried about losing your housing?: No   Tobacco Use: Medium Risk (10/2/2024)    Patient History     Smoking Tobacco Use: Former     Smokeless Tobacco Use: Former     Passive Exposure: Never   Financial Resource Strain: Low Risk  (10/16/2024)    Financial Resource Strain     Within the past 12 months, have you or your family members you live with been unable to get utilities (heat, electricity) when it was really needed?: No   Alcohol Use: Not At Risk (9/16/2024)    AUDIT-C     Frequency of Alcohol Consumption: Never     Average Number of Drinks: Patient does not drink     Frequency of Binge Drinking: Never   Transportation Needs: Low Risk  (10/16/2024)    Transportation Needs     Within the past 12 months, has lack of transportation kept you from medical appointments, getting your medicines, non-medical meetings or appointments, work, or from getting things that you need?: No   Physical Activity: Unknown (3/13/2024)    Exercise Vital Sign     Days of Exercise per Week: 0 days     Minutes of Exercise per Session: Not on file   Interpersonal Safety: Low Risk  (9/27/2024)    Interpersonal Safety     Do you feel physically and emotionally safe where you currently live?: Yes     Within the past 12 months, have you been hit, slapped, kicked or otherwise physically hurt by someone?: No     Within the past 12 months, have you been humiliated or emotionally abused in other ways by your partner or ex-partner?: No   Stress: No Stress Concern Present (3/13/2024)    [employer] [place] [employer] - Occupational Stress Questionnaire     Feeling of Stress : Not at all   Social Connections: Unknown (3/13/2024)    Social Connection and Isolation Panel [NHANES]     Frequency of Communication with Friends and Family: Not on file     Frequency of Social Gatherings with Friends  "and Family: Three times a week     Attends Buddhist Services: Not on file     Active Member of Clubs or Organizations: Not on file     Attends Club or Organization Meetings: Not on file     Marital Status: Not on file   Health Literacy: Not on file       Functional Status:  Prior to admission patient needed assistance:   Dependent ADLs:: Independent  Dependent IADLs:: Cleaning, Cooking, Laundry, Meal Preparation       Mental Health Status:  Mental Health Status: Current Concern  Mental Health Management: Medication, Psychiatrist ()    Chemical Dependency Status:  Chemical Dependency Status: No Current Concerns             Values/Beliefs:  Spiritual, Cultural Beliefs, Buddhist Practices, Values that affect care: no               Discussed  Partnership in Safe Discharge Planning  document with patient/family: No    Additional Information:  SW consulted for elevated URR. Pt is under MH commitment through [facility]. Saumya met with psychiatry NP, who has been working with pt while in the ED.  On 10/14/24, NP submitted Ashley commitment paperwork to the [facility].      Per chart, \"[facility] case mgmnt supervisor Milagros Alberto ([phone]) who stated that she will request revocation of provisional discharge. Milagros confirmed that pt has not been doing well recently in independent living. Milagros confirmed that currently pt has been managing his own meds, though case mgrs have been working on a plan for additional supports around that.\"     Pt admitted to the hospital since pt has C-diff with loose stools. Pt was on IP MH list but taken off due to this. Pt needs to have 48 hours free of loose stools before placing on list again.    Per NP today, if pt continues to take medications as prescribed and mental health stabilizes,they may be able to discharge back home with supports, if unable to get IP MH before then.    Next Steps: SAUMYA will follow peripherally to assist with coordination.    DESIRE Hughes, " [facility]  Inpatient Care Coordination  [facility]  [phone]       RAÚL CHAUHAN, [facility]

## 2024-10-17 NOTE — PLAN OF CARE
"Goal Outcome Evaluation:      Plan of Care Reviewed With: patient    Overall Patient Progress: no changeOverall Patient Progress: no change    Outcome Evaluation: Pt is alert and oriented x4, independent in room, sitter due to suicidal precautions at the bed side. Pt is restless, reported anxiety, reported muscle spasms to ankles, pain to R fingers. Per pt he had 6 loose stools today. voiding without difficulties. Pt has skin tears to L hand, L shin. ACE wrap to R hand 5th digit fx. Pt received prn Tylenol, prn Flexeryl, prn Klonopin. . T pump orderred for stomache cramps.      Problem: Adult Inpatient Plan of Care  Goal: Plan of Care Review  Description: The Plan of Care Review/Shift note should be completed every shift.  The Outcome Evaluation is a brief statement about your assessment that the patient is improving, declining, or no change.  This information will be displayed automatically on your shift  note.  Outcome: Not Progressing  Flowsheets (Taken 10/16/2024 5902)  Outcome Evaluation: Pt is alert and oriented x4, independent in room, sitter due to suicidal precautions at the bed side. Pt is restless, reported anxiety, reported muscle spasms to ankles, pain to R fingers. Per pt he had 6 loose stools today. voiding without difficulties. Pt has skin tears to L hand, L shin. ACE wrap to R hand 5th digit fx. Pt received prn Tylenol, prn Flexeryl, prn Klonopin. . T pump orderred for stomache cramps.  Plan of Care Reviewed With: patient  Overall Patient Progress: no change  Goal: Patient-Specific Goal (Individualized)  Description: You can add care plan individualizations to a care plan. Examples of Individualization might be:  \"Parent requests to be called daily at 9am for status\", \"I have a hard time hearing out of my right ear\", or \"Do not touch me to wake me up as it startles  me\".  Outcome: Not Progressing  Goal: Absence of Hospital-Acquired Illness or Injury  Outcome: Not " Progressing  Intervention: Identify and Manage Fall Risk  Recent Flowsheet Documentation  Taken 10/16/2024 2214 by Esme Faust, RN  Safety Promotion/Fall Prevention:   safety round/check completed   room door open   lighting adjusted   supervised activity   clutter free environment maintained   bedside attendant  Intervention: Prevent and Manage VTE (Venous Thromboembolism) Risk  Recent Flowsheet Documentation  Taken 10/16/2024 2214 by Esme Faust, RN  VTE Prevention/Management: (on lovenox) other (see comments)  Goal: Optimal Comfort and Wellbeing  Outcome: Not Progressing  Goal: Readiness for Transition of Care  Outcome: Not Progressing     Problem: Seclusion/Restraint, Behavioral  Goal: Seclusion/Behavioral Restraint Goal: Absence of Harm or Injury  Outcome: Not Progressing     Problem: Suicide Risk  Goal: Absence of Self-Harm  Outcome: Not Progressing  Intervention: Assess Risk to Self and Maintain Safety  Recent Flowsheet Documentation  Taken 10/16/2024 2214 by Esme Faust, RN  Enhanced Safety Measures:  at bedside

## 2024-10-17 NOTE — PROGRESS NOTES
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  Patient was awaiting transfer to a close psych unit when developed diarrhea with nausea and C. difficile antigen was positive with C. difficile toxin negative.  ID was consulted and given the patient's symptomatology oral vancomycin was started on 10/17/2024.   Patient also has right nondisplaced fracture of the base of the fifth metatarsal that was sustained on 10/14/2024 after patient punched the wall.          Date of Admission:  10/12/2024  Assessment & Plan   Acute diarrhea  In a patient who was on antibiotics recently and a month ago  Patient had about 4-5 stools today which were loose  The C. difficile antigen is positive with C. difficile toxin being negative  At this time we will monitor the diarrhea and ideally not treated  Consult ID for advise regarding treatment   Give IV Fluids bolus and maintenance fluids     Type 2 diabetes mellitus  Will check sugars 4 times daily and give sliding scale insulin  Continue empagliflozin 10 mg daily     Suicidal ideation  Schizoaffective disorder  Borderline personality disorder  Depression  PTSD  TBI  Management as per psychiatry  Psychiatry is consulted     GERD  Will not treat GERD at this time with no symptoms     Hypertension  Continue amlodipine 5 mg daily but increase the dose to 10 mg daily as the blood pressure is still elevated at 144/100     Fracture base of right fifth  "metacarpal  Pain management            Plan:   Continue the oral Vancomycin  antibiotics       Diet: Regular Diet Adult    DVT Prophylaxis: Enoxaparin (Lovenox) SQ  Styles Catheter: Not present  Code Status: Full Code         Medically Ready for Discharge: 2-4 days - when a Psychiatry  bed is found - otherwise under court commitment     Clinically Significant Risk Factors Present on Admission                   # Hypertension: Noted on problem list        # DMII: A1C = 7.4 % (Ref range: 0.0 - 5.6 %) within past 6 months    # Obesity: Estimated body mass index is 35.51 kg/m  as calculated from the following:    Height as of this encounter: 1.676 m (5' 6\").    Weight as of this encounter: 99.8 kg (220 lb).       # Financial/Environmental Concerns: none               The patient's care was discussed with the Patient and RN.    Osbaldo Davis MD  Hospitalist Service  [facility]    ______________________________________________________________________    Interval History     Symptoms   Diarrhea is worse today had 10-12 loose stools     Review of Systems:   Nausea without vomiting      Data reviewed today: I reviewed all medications, new labs and imaging results over the last 24 hours.     Physical Exam   Vital Signs: Temp: 97.6  F (36.4  C) Temp src: Temporal BP: 115/78 Pulse: 86   Resp: 18 SpO2: 95 % O2 Device: None (Room air)    Weight: 220 lbs 0 oz    GENERAL: Patient does not look in any acute distress  HEENT: EOM+, Conjunctiva is clear   NECK:  no Jugular Venous distention  HEART: S1 S2 regular rate and rhythm  LUNGS: Respirations are  not laboured, Lungs are clear to auscultation without Crepitations or Wheezing   ABDOMEN: Soft, there is no tenderness,  Bowel Sounds are Positive   LOWER LIMBS: no Pedal Edema  Bilaterally right upper extremity in a dressing wrap  CNS:  Alert,  Oriented x 3, Moving all the Four Limb      Data   Recent Labs   Lab 10/17/24  1147 10/17/24  0720 10/17/24  0549 " 10/15/24  1124 10/12/24  2056 10/12/24  2055   WBC  --   --  7.5  --  14.6*  --    HGB  --   --  15.0  --  15.9  --    MCV  --   --  86  --  84  --    PLT  --   --  244  --  295  --    INR  --   --   --   --   --  1.01   NA  --   --  139  --   --  137   POTASSIUM  --   --  4.3  --   --  3.8   CHLORIDE  --   --  104  --   --  100   CO2  --   --  18*  --   --  22   BUN  --   --  12.9  --   --  11.1   CR  --   --  0.66  --   --  0.76   ANIONGAP  --   --  17*  --   --  15   WILLIAMS  --   --  9.1  --   --  9.9   * 175* 185*   < >  --  141*   ALBUMIN  --   --   --   --   --  5.0   PROTTOTAL  --   --   --   --   --  8.3   BILITOTAL  --   --   --   --   --  0.3   ALKPHOS  --   --   --   --   --  88   ALT  --   --   --   --   --  52   AST  --   --   --   --   --  52*    < > = values in this interval not displayed.         No results found for this or any previous visit (from the past 24 hour(s)).

## 2024-10-17 NOTE — PLAN OF CARE
"Goal Outcome Evaluation:      Plan of Care Reviewed With: patient    Overall Patient Progress: no changeOverall Patient Progress: no change    Outcome Evaluation: Pt is alert and oriented x4, independent. Pain controlled with prn Tylenol. Pt slept most of the night, calm, coopeartive. 4 loose stools. NS infusing at 100 ml/hr. Sitter at the bed site.      Problem: Adult Inpatient Plan of Care  Goal: Plan of Care Review  Description: The Plan of Care Review/Shift note should be completed every shift.  The Outcome Evaluation is a brief statement about your assessment that the patient is improving, declining, or no change.  This information will be displayed automatically on your shift  note.  10/17/2024 0511 by Esme Faust, RN  Outcome: Not Progressing  Flowsheets (Taken 10/17/2024 0508)  Outcome Evaluation: Pt is alert and oriented x4, independent. Pain controlled with prn Tylenol. Pt slept most of the night, calm, coopeartive. 4 loose stools. NS infusing at 100 ml/hr. Sitter at the bed site.  Plan of Care Reviewed With: patient  Overall Patient Progress: no change  10/16/2024 2329 by Esme Faust, RN  Outcome: Not Progressing  Flowsheets (Taken 10/16/2024 2325)  Outcome Evaluation: Pt is alert and oriented x4, independent in room, sitter due to suicidal precautions at the bed side. Pt is restless, reported anxiety, reported muscle spasms to ankles, pain to R fingers. Per pt he had 6 loose stools today. voiding without difficulties. Pt has skin tears to L hand, L shin. ACE wrap to R hand 5th digit fx. Pt received prn Tylenol, prn Flexeryl, prn Klonopin. . T pump orderred for stomache cramps.  Plan of Care Reviewed With: patient  Overall Patient Progress: no change  Goal: Patient-Specific Goal (Individualized)  Description: You can add care plan individualizations to a care plan. Examples of Individualization might be:  \"Parent requests to be called daily at 9am for status\", \"I have a hard time hearing out of my " "right ear\", or \"Do not touch me to wake me up as it startles  me\".  10/17/2024 0511 by Esme Faust RN  Outcome: Not Progressing  10/16/2024 2329 by Esme Faust RN  Outcome: Not Progressing  Goal: Absence of Hospital-Acquired Illness or Injury  10/17/2024 0511 by Esme Faust RN  Outcome: Not Progressing  10/16/2024 2329 by Esme Faust RN  Outcome: Not Progressing  Intervention: Identify and Manage Fall Risk  Recent Flowsheet Documentation  Taken 10/17/2024 0111 by Esme Faust RN  Safety Promotion/Fall Prevention:   safety round/check completed   room door open   lighting adjusted   supervised activity   clutter free environment maintained   bedside attendant  Taken 10/16/2024 2214 by Esme Faust RN  Safety Promotion/Fall Prevention:   safety round/check completed   room door open   lighting adjusted   supervised activity   clutter free environment maintained   bedside attendant  Intervention: Prevent and Manage VTE (Venous Thromboembolism) Risk  Recent Flowsheet Documentation  Taken 10/17/2024 0111 by Esme Faust RN  VTE Prevention/Management: (on lovenox) other (see comments)  Taken 10/16/2024 2214 by Esme Faust RN  VTE Prevention/Management: (on lovenox) other (see comments)  Goal: Optimal Comfort and Wellbeing  10/17/2024 0511 by Esme Faust RN  Outcome: Not Progressing  10/16/2024 2329 by Esme Faust RN  Outcome: Not Progressing  Goal: Readiness for Transition of Care  10/17/2024 0511 by Esme Faust RN  Outcome: Not Progressing  10/16/2024 2329 by Esme Faust RN  Outcome: Not Progressing     Problem: Seclusion/Restraint, Behavioral  Goal: Seclusion/Behavioral Restraint Goal: Absence of Harm or Injury  10/17/2024 0511 by Esme Faust RN  Outcome: Not Progressing  10/16/2024 2329 by Esme Faust RN  Outcome: Not Progressing     Problem: Suicide Risk  Goal: Absence of Self-Harm  10/17/2024 0511 by Esme Faust RN  Outcome: Not Progressing  10/16/2024 2329 by Esme Faust RN  Outcome: Not " Progressing  Intervention: Assess Risk to Self and Maintain Safety  Recent Flowsheet Documentation  Taken 10/17/2024 0111 by Esme Faust, RN  Enhanced Safety Measures:  at bedside  Taken 10/16/2024 2214 by Esme Faust, RN  Enhanced Safety Measures:  at bedside

## 2024-10-17 NOTE — CONSULTS
Psychiatry Consultation; Follow up              Reason for Consult, requesting source:    Suicidal ideation; follow-up, possible med admit    Requesting source: Osbaldo Davis    Labs and imaging reviewed. Spoke with RN- AUGIE Mace, and julius Davis.                Interim history:    Prakash Prasad is a 34 year old female to 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 adult with past  history notable or TBI, type 2 DM, urinary retention, neurogenic bladder, GERD, ADHD, bipolar 1 disorder, polysubstance use disorder, borderline personality disorder, and schizoaffective disorder who presented to the ED via EMS on 10/12/24 following an overdose on gabapentin and Klonopin. He reported he took these medications as he felt stressed in order to relax, he called 911 when he started to feel dizzy. At one point he also endorsed suicidal ideation yet denied that this was suicide attempt. Initial DEC assessment completed and recommended inpatient stabilization as patient is under MI commitment in [facility] but has been refusing medications, is not engaging in outpatient care, and appears to be decompensating.  While in the ED, Prakash became dysregulated when told he was not able to leave resulting in restraints. During initial psych consult on 10/14/24 patient reported he would continue to refuse medications and that he had stopped all psychiatric medications 1-2 weeks prior. Later that evening he and punched a wall resulting in a nondisplaced fracture of the base of the fifth metacarpal.  Patient is currently under MI committment in [facility] however no Ashley was in place. Petition for Ashley submitted on 10/14/24. Commitment  sent intent to revoke provisional discharge and [facility] Court hold has been received. Patient is now under a court hold.     Additionally, medical work-up concerning for UTI, kelfex started, however culture showed mixed elgin and keflex was discontinued. Patient reported having increasing  "loose stools. Patient's C. difficile is PCR positive and toxin negative; vancomycin started and enteric panel ordered for definitive diagnosis. Patient cannot transfer to [facility] until free of diarrhea x 48 hours. Patient subsequently admitted to medical for additional monitoring. Psychiatry continuing to consult.       Prakash was sleeping when I went to meet with him. He woke up quickly when his name was called. He denies SI/HI and denies AH/VH. He tells me he feels better since coming to the hospital as his suicidal thoughts are gone. He is tired as he did not sleep much. He notes a fair appetite yet does have some stomach cramping.  I reviewed that he is currently on a court hold which he tells me he knows. Record review indicates he is not longer refusing his psychiatric medications, has been agreeable to taking them since 10/15. Lithium has not been reordered, he does not want a medication where he has to have blood draws. He tells me that he has decided he's going to take them now and feels \"more stable.\" He tells me ideally in the future he would like to simplify his medications and transition off of Abilify since he started Invega during his last inpatient stay. He has an appointment on 10/29 to establish care with a new psychiatrist. He appeared calm and cooperative today. Spoke with nursing, patient has been cooperative and compliant with medications.    Spoke with  and relayed patient is currently under commitment in [facility] and is on a court hold- currently cannot leave unless discharge is deemed appropriate and consultation with  occurs.         Current Medications:     Current Facility-Administered Medications   Medication Dose Route Frequency Provider Last Rate Last Admin    amLODIPine (NORVASC) tablet 10 mg  10 mg Oral Daily Osbaldo Davis MD   10 mg at 10/17/24 0923    ARIPiprazole (ABILIFY) tablet 10 mg  10 mg Oral Kwame Camarillo MD   10 mg at 10/17/24 " "0924    empagliflozin (JARDIANCE) tablet 10 mg  10 mg Oral Daily Kwame Encarnacion MD   10 mg at 10/17/24 0923    enoxaparin ANTICOAGULANT (LOVENOX) injection 40 mg  40 mg Subcutaneous Q24H Osbaldo Davis MD   40 mg at 10/16/24 2230    gabapentin (NEURONTIN) tablet 1,200 mg  1,200 mg Oral BID Osbaldo Davis MD   1,200 mg at 10/17/24 0922    insulin aspart (NovoLOG) injection (RAPID ACTING)  1-7 Units Subcutaneous TID AC Osbaldo Davis MD   1 Units at 10/17/24 0758    insulin aspart (NovoLOG) injection (RAPID ACTING)  1-5 Units Subcutaneous At Bedtime Osbaldo Davis MD        nicotine (NICODERM CQ) 14 MG/24HR 24 hr patch 1 patch  1 patch Transdermal Daily Hardik Doran DO   1 patch at 10/17/24 0613    OLANZapine (zyPREXA) injection 10 mg  10 mg Intramuscular Once Marta Mays MD        paliperidone (INVEGA) 24 hr tablet 1.5 mg  1.5 mg Oral QAM Kwame Encarnacion MD   1.5 mg at 10/17/24 0924    paliperidone ER (INVEGA) 24 hr tablet 3 mg  3 mg Oral QAM Kwame Encarnacion MD   3 mg at 10/17/24 0925    PARoxetine (PAXIL) tablet 20 mg  20 mg Oral Daily Kwame Encarnacion MD   20 mg at 10/17/24 0923    QUEtiapine (SEROquel) tablet 200 mg  200 mg Oral At Bedtime Tank Durbin MD   200 mg at 10/16/24 2231    rosuvastatin (CRESTOR) tablet 40 mg  40 mg Oral Daily Kwame Encarnacion MD   40 mg at 10/17/24 0923    testosterone (ANDROGEL/TESTIM) 50 MG/5GM (1%) topical gel 100 mg of testosterone  100 mg of testosterone Transdermal Daily Kwame Encarnacion MD   100 mg of testosterone at 10/17/24 0925              MSE:   Appearance: dressed in hospital scrubs, appeared as age stated, and initially sleeping yet easily awakened when name was called   Attitude:  cooperative  Eye Contact:  good  Mood:   \"better\"  Affect:  appropriate and in normal range and mood congruent  Speech:  clear, coherent and normal prosody  Psychomotor Behavior:  no evidence of tardive dyskinesia, dystonia, or " "tics  Thought Process:  logical and linear  Associations:  no loose associations  Thought Content:  no evidence of suicidal ideation or homicidal ideation and no evidence of psychotic thought  Insight:  fair  Judgement:  fair  Oriented to:  time, person, and place  Attention Span and Concentration:  fair  Recent and Remote Memory:  fair        Vital signs:  Temp: 97.6  F (36.4  C) Temp src: Temporal BP: 115/78 Pulse: 86   Resp: 18 SpO2: 95 % O2 Device: None (Room air) Oxygen Delivery: 4 LPM Height: 167.6 cm (5' 6\") Weight: 99.8 kg (220 lb)  Estimated body mass index is 35.51 kg/m  as calculated from the following:    Height as of this encounter: 1.676 m (5' 6\").    Weight as of this encounter: 99.8 kg (220 lb).              DSM-5 Diagnosis:   295.70  (F25) Schizoaffective Disorder Bipolar Type  Borderline Personality Disorder           Assessment:   Prakash Prasad is a 34 year old female to 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 adult with past  history notable or TBI, type 2 DM, urinary retention, neurogenic bladder, GERD, ADHD, bipolar 1 disorder, polysubstance use disorder, borderline personality disorder, and schizoaffective disorder who presented to the ED via EMS on 10/12/24 following an overdose on gabapentin and Klonopin. He reported he took these medications as he felt stressed in order to relax, he called 911 when he started to feel dizzy. At one point he also endorsed suicidal ideation yet denied that this was suicide attempt. Initial DEC assessment completed and recommended inpatient stabilization as patient is under MI commitment in [facility] but has been refusing medications, is not engaging in outpatient care, and appears to be decompensating.  While in the ED, Prakash became dysregulated when told he was not able to leave resulting in restraints. During initial psych consult on 10/14/24 patient reported he would continue to refuse medications and that he had stopped all psychiatric medications 1-2 weeks prior. Later " that evening he and punched a wall resulting in a nondisplaced fracture of the base of the fifth metacarpal.  Patient is currently under MI committment in [facility] however no Ashley was in place. Petition for Ashley submitted on 10/14/24. Commitment  sent intent to revoke provisional discharge and [facility] Court hold has been received. Patient is now under a court hold.  Additionally, medical work-up concerning for UTI, kelfex started, however culture showed mixed elgin and keflex was discontinued. Patient reported having increasing loose stools. Patient's C. difficile is PCR positive and toxin negative; vancomycin started and enteric panel ordered for definitive diagnosis. Patient cannot transfer to  psych until free of diarrhea x 48 hours. Patient subsequently admitted to medical for additional monitoring. Psychiatry continuing to consult.       Today, Prakash appeared calm and cooperative. Denies SI/HI and no evidence of psychosis. Energy is fair. Prakash has been compliant with oral medications since 10/15/24 with exception of lithium as he does not want to restart this due to ongoing therapeutic blood level monitoring. Prakash does acknowledge that he feels more kong when taking medications. Discussed plan to continue to pursue inpatient psychiatry once medically cleared. Patient does appear significantly more regulated and engaged in assessment when compared to earlier this week. I suspect earlier dysregulation and affect instability was due to non adherence to medication regimen. Will continue current medication regimen as this was resumed on 10/15/24 after reported 1-2 weeks of medication noncompliance. Will continue observation with plans to transition to inpatient psychiatry once medically cleared.     Commitment  Jie David at [phone] [email or web address];  normally works Tues-Fri.           Summary of Recommendations:   Continue PTA  "medications- patient has been compliant with oral psychiatric medications since 10/15/24  Patient is under commitment through [facility], currently under a court hold and cannot leave  Once medically cleared, patient will need to be added to inpatient mental health admission. Once medically cleared, intake will need to be called at [phone] to have patient added to admission list.  Should medical clearance be prolonged and patient continues to improve from a psychiatric standpoint- denial of suicidal ideation, affect stability, compliance with medications, decreased agitation- reassessment indicated to determine if change in disposition is warranted - IP psych vs return to established community based supports.   Psychiatry will continue to follow- next available in-person psych consult will be 10/21/24    BROOKLYN Glass [facility]  Consult/Liaison Psychiatry   [facility]    Please call the [facility] CL line ([phone]) with questions and to determine consult service coverage.   \"Much or all of the text in this note was generated through the use of Dragon Dictate voice to text software. Errors in spelling or words which appear to be out of contact are unintentional, may be present due having escaped editing\"          "

## 2024-10-17 NOTE — CONSULTS
[facility]    Infectious Disease Consultation     Date of Admission:  10/12/2024  Date of Consult (When I saw the patient): 10/17/24    Assessment & Plan   Prakash Prasad is a 34 year old adult who was admitted on 10/12/2024.     Impression: 1 34-year-old male, housed in ER for several days awaiting psychiatric disposition with multiple underlying psychiatric issues  2 mildly abnormal urinalysis given Keflex, urine culture eventually unremarkable and not obvious true UTI  3 acute diarrhea, multiple loose stools continuing, crampy abdominal pain, C. difficile PCR positive but toxin negative implying colonization, also of interest #4 below, however hard to exclude that this is true C. difficile colitis from the current antibiotic course  4 prior C. difficile colitis in 2018 until 2020, even then a typical pattern a typical response to treatment, consideration was colonization rather than true infection and then eventually resolved and does not have ongoing chronic diarrhea    REC 1 hard to exclude this is not early true C. difficile given ongoing diarrhea and major disposition from this is going to create for now I think we treat, will do Vanco, get an enteric panel as well given is not clear the diagnosis actually C. difficile.  If he has continued crampy abdominal pain and nonresolving diarrhea question CT scan and look for other causes here        Lamine Vieyra MD    Reason for Consult   Reason for consult: I was asked to evaluate this patient for C. difficile colitis.    Primary Care Physician   Becki Avery    Chief Complaint   Crampy abdominal pain and diarrhea    History is obtained from the patient and medical records    History of Present Illness   Prakash Prasad is a 34 year old adult who presents with in the ER for several days awaiting disposition for psychiatric reasons.  At initial presentation no diarrhea or major infection symptoms.  Because of the CNS symptoms he had  among other things a urine done which was mildly abnormal treated with Keflex but culture negative.  Following the Keflex developed acute diarrhea.  His C. difficile is PCR positive and toxin negative ongoing diarrhea and major crampy abdominal pain currently no major fever or chills and white count normal.    Of note in 2018 in 2020 had diarrhea C. difficile positive at that time of course had only the 1 step test, was treated on both occasions and had a typical response to treatment eventually got fidaxomicin and essentially resolved.  This is a typical response pattern suggests it may have been colonization at the time rather than true infection as well    Past Medical History   I have reviewed this patient's medical history and updated it with pertinent information if needed.   Past Medical History:   Diagnosis Date    ADHD (attention deficit hyperactivity disorder)     Bipolar 1 disorder     Borderline personality disorder     Cauda equina syndrome     Chronic low back pain     Depression     Diabetes mellitus, type 2 (H) 1/19/2023    GERD (gastroesophageal reflux disease)     h/o TBI (traumatic brain injury)     Hypertension, unspecified type 12/16/2021    Marginal corneal ulcer, left 07/17/2015    Nephrolithiasis     obesity     Polysubstance abuse - methamphetamine, hallucinagen, heroin, marijuana     currently in remission    PONV (postoperative nausea and vomiting)     PTSD (post-traumatic stress disorder)        Past Surgical History   I have reviewed this patient's surgical history and updated it with pertinent information if needed.  Past Surgical History:   Procedure Laterality Date    BACK SURGERY  12/24/2016    BACK SURGERY - For Cauda Equina Syndrome  12/24/2016    COLONOSCOPY      COMBINED CYSTOSCOPY, INSERT STENT URETER(S) Left 8/30/2018    Procedure: COMBINED CYSTOSCOPY, INSERT STENT URETER(S);  Cystoscopy With Left Stent Placement;  Surgeon: Kiran Ulrich MD;  Location: [facility] OR     COMBINED CYSTOSCOPY, RETROGRADES, EXCHANGE STENT URETER(S) Left 10/14/2018    Procedure: COMBINED CYSTOSCOPY, RETROGRADES, EXCHANGE STENT URETER(S);  Cystoscopy and removal of left-sided stent.;  Surgeon: Stiven Olivo MD;  Location:  OR    COMBINED CYSTOSCOPY, RETROGRADES, URETEROSCOPY, INSERT STENT  1/6/2014    Procedure: COMBINED CYSTOSCOPY, RETROGRADES, URETEROSCOPY, INSERT STENT;  Cystyoscopy place left ureteral stent;  Surgeon: Jaun Kimble MD;  Location: [facility] OR    COMBINED CYSTOSCOPY, RETROGRADES, URETEROSCOPY, INSERT STENT Left 10/23/2018    Procedure: Video cystoscopy, left ureteroscopy with stone extraction;  Surgeon: Bull Mast MD;  Location:  OR    CYSTOSCOPY, URETEROSCOPY, COMBINED Right 9/23/2015    Procedure: COMBINED CYSTOSCOPY, URETEROSCOPY;  Surgeon: ROME Jett MD;  Location: [facility] OR    ENT SURGERY      ESOPHAGOSCOPY, GASTROSCOPY, DUODENOSCOPY (EGD), COMBINED  4/8/2013    Procedure: COMBINED ESOPHAGOSCOPY, GASTROSCOPY, DUODENOSCOPY (EGD), BIOPSY SINGLE OR MULTIPLE;  Gastroscopy;  Surgeon: Peter Pickard MD;  Location: [facility]    ESOPHAGOSCOPY, GASTROSCOPY, DUODENOSCOPY (EGD), COMBINED Left 10/28/2014    Procedure: COMBINED ESOPHAGOSCOPY, GASTROSCOPY, DUODENOSCOPY (EGD), BIOPSY SINGLE OR MULTIPLE;  Surgeon: Narcisa Ramirez MD;  Location:  OR    ESOPHAGOSCOPY, GASTROSCOPY, DUODENOSCOPY (EGD), COMBINED N/A 12/24/2018    Procedure: COMBINED ESOPHAGOSCOPY, GASTROSCOPY, DUODENOSCOPY (EGD), BIOPSY SINGLE OR MULTIPLE;  Surgeon: Sonu Verduzco MD;  Location: [facility]    INJECT EPIDURAL TRANSFORAMINAL LUMBAR / SACRAL EA ADDITIONAL LEVEL Left 3/18/2021    Procedure: Left L5/S1 transforaminal injection for selective L5 nerve root block;  Surgeon: Eliza Pagan MD;  Location: [facility] [place] [facility] OR    LAPAROSCOPIC CHOLECYSTECTOMY  11/20/2014    [facility], Dr. Ramirez    LASER HOLMIUM LITHOTRIPSY URETER(S), INSERT STENT, COMBINED  4/2/2014    Procedure: COMBINED CYSTOSCOPY,  URETEROSCOPY, LASER HOLMIUM LITHOTRIPSY URETER(S), INSERT STENT;  Cystoscopy,Left Ureteral Stent Removal,Left Ureteroscopy with Laser Lithotripsy,Left Ureter Stent Placement;  Surgeon: ROME Jett MD;  Location: [facility] OR    Transurethral stone resection  03/11/2011       Prior to Admission Medications   Prior to Admission Medications   Prescriptions Last Dose Informant Patient Reported? Taking?   ACE/ARB/ARNI NOT PRESCRIBED (INTENTIONAL)   Yes No   Sig: Please choose reason not prescribed from choices below.   ARIPiprazole (ABILIFY) 10 MG tablet Past Month at am  No Yes   Sig: Take 1 tablet (10 mg) by mouth every morning.   PARoxetine (PAXIL) 20 MG tablet Past Month at am  No Yes   Sig: Take 1 tablet (20 mg) by mouth daily.   QUEtiapine (SEROQUEL) 200 MG tablet Past Month at hs  No Yes   Sig: Take 1 tablet (200 mg) by mouth at bedtime   Semaglutide (RYBELSUS) 7 MG tablet Past Month at am  No Yes   Sig: Take 1 tablet (7 mg) by mouth daily.   acetaminophen (TYLENOL) 325 MG tablet prn at prn  Yes Yes   Sig: Take 325-650 mg by mouth every 6 hours as needed for mild pain.   amLODIPine (NORVASC) 5 MG tablet Past Month at am  No Yes   Sig: Take 1 tablet (5 mg) by mouth daily.   blood glucose (NO BRAND SPECIFIED) test strip   No No   Sig: Use to test blood sugar one times daily and as needed. To accompany: Blood Glucose Monitor Brands: per insurance.   clindamycin phos-benzoyl perox (DUAC) 1.2-5 % external gel Past Month  No Yes   Sig: Apply topically daily.   clonazePAM (KLONOPIN) 0.5 MG tablet 10/12/2024  No Yes   Sig: Take 1 tablet (0.5 mg) by mouth 2 times daily as needed for anxiety.   cyclobenzaprine (FLEXERIL) 10 MG tablet prn at prn  No Yes   Sig: Take 1 tablet (10 mg) by mouth every 8 hours as needed for muscle spasms.   empagliflozin (JARDIANCE) 10 MG TABS tablet Past Month at am  No Yes   Sig: Take 1 tablet (10 mg) by mouth daily   gabapentin (NEURONTIN) 600 MG tablet 10/12/2024  No Yes   Sig: Take 2 tablets  (1200 mg) morning and evening, 1 tablet mid-day (600 mg)   insulin pen needle (32G X 4 MM) 32G X 4 MM miscellaneous   No No   Sig: Use 1 pen needles daily or as directed.   naloxone (NARCAN) 4 MG/0.1ML nasal spray prn at prn  No Yes   Sig: Spray 1 spray (4 mg) into one nostril alternating nostrils as needed for opioid reversal. every 2-3 minutes until assistance arrives   ondansetron (ZOFRAN ODT) 4 MG ODT tab prn at prn  No Yes   Sig: Take 1 tablet (4 mg) by mouth every 8 hours as needed for nausea.   oxyCODONE (ROXICODONE) 5 MG tablet prn at prn  No Yes   Sig: Take 1-2 tablets (5-10 mg) by mouth every 6 hours as needed for severe pain.   oxyCODONE (ROXICODONE) 5 MG tablet   No No   Sig: Take 1 tablet (5 mg) by mouth every 6 hours as needed for pain.   oxyCODONE (ROXICODONE) 5 MG tablet   No No   Sig: Take 1 tablet (5 mg) by mouth every 6 hours as needed for pain.   paliperidone (INVEGA) 1.5 MG 24 hr tablet Past Month at am  Yes Yes   Sig: Take 1.5 mg by mouth every morning. Take with 3mg tablet for total dose of 4.5mg   paliperidone ER (INVEGA) 3 MG 24 hr tablet Past Month at am  Yes Yes   Sig: Take 3 mg by mouth every morning. Take with 1.5 mg tablet for total dose of 4.5mg   rosuvastatin (CRESTOR) 40 MG tablet Past Month at am  No Yes   Sig: Take 1 tablet (40 mg) by mouth daily   testosterone (ANDROGEL/TESTIM) 50 MG/5GM (1%) topical gel Past Month at am  No Yes   Sig: Place 2 packets (100 mg of testosterone) onto the skin daily   thin (NO BRAND SPECIFIED) lancets   No No   Sig: Use with lanceting device to check blood sugars once per day. To accompany: Blood Glucose Monitor Brands: per insurance.   traZODone (DESYREL) 50 MG tablet prn at prn  No Yes   Sig: Take 1 tablet (50 mg) by mouth nightly as needed for sleep.   valACYclovir (VALTREX) 1000 mg tablet prn at prn  No Yes   Sig: Take 2 tablets (2,000 mg) by mouth 2 times daily   Patient taking differently: Take 2,000 mg by mouth 2 times daily as needed.       Facility-Administered Medications: None     Allergies   Allergies   Allergen Reactions    Haldol [Haloperidol] Other (See Comments)     Makes patient very angry and anxious    Adhesive Tape Hives    Prednisone Other (See Comments) and Hives     Suicidal ideation    Droperidol Anxiety       Immunization History   Immunization History   Administered Date(s) Administered    COVID-19 Bivalent 18+ (Moderna) 12/15/2022    COVID-19 Monovalent 18+ (Moderna) 07/21/2021, 10/06/2021    COVID-19 Monovalent Booster 18+ (Moderna) 08/29/2022    DTAP (<7y) 08/01/1995    HEPA 10/10/2014    HepB 10/21/2002, 10/10/2014    Hepatitis A (ADULT 19+) 10/10/2014    Hepatitis B, Adult 10/10/2014, 09/04/2020    Hepatitis B, Peds 10/21/2002    Historical DTP/aP 08/01/1995    INFLUENZA,TRIVALENT (FLUCELVAX) 09/01/2015    Influenza Vaccine >6 months,quad, PF 09/21/2015, 01/20/2017, 10/09/2019, 09/04/2020, 12/07/2021, 12/15/2022, 11/15/2023    MMR 01/25/2000    OPV, trivalent, live 08/01/1995    Pneumococcal 23 valent 09/04/2020    Poliovirus, inactivated (IPV) 08/01/1995    TD,PF 7+ (Tenivac) 10/30/2002, 09/13/2024    Td (Adult), Adsorbed 10/30/2002    Tdap (Adacel,boostrix) 03/27/2014       Social History   I have reviewed this patient's social history and updated it with pertinent information if needed. Prakash Prasad  reports that he quit smoking about 2 years ago. His smoking use included other and cigarettes. He started smoking about 13 years ago. He has a 5.6 pack-year smoking history. He has never been exposed to tobacco smoke. He quit smokeless tobacco use about 4 years ago.  His smokeless tobacco use included chew. He reports current alcohol use. He reports that he does not currently use drugs after having used the following drugs: Marijuana and Opiates.    Family History   I have reviewed this patient's family history and updated it with pertinent information if needed.   Family History   Problem Relation Age of Onset     "Hyperlipidemia Mother     Mental Illness Mother     Anxiety Disorder Mother     Anesthesia Reaction Mother         Vomiting/Nausea    Other Cancer Mother     Skin Cancer Mother     Gastrointestinal Disease Father         Crohn's Disease    Cancer Father         Liver Cancer    Other Cancer Father         Liver    Obesity Father     Skin Cancer Father     No Known Problems Sister     Hypertension Brother     Other Cancer Brother         Esophagecial    Diabetes Brother     Obesity Brother     Hypertension Brother     Other Cancer Brother     Cancer Maternal Grandmother         lympoma    Diabetes Maternal Grandmother     Mental Illness Maternal Grandmother     Other Cancer Maternal Grandmother         Non Hodgkins Lymphoma    Diabetes Maternal Grandfather     Hypertension Maternal Grandfather     Prostate Cancer Maternal Grandfather     Hyperlipidemia Maternal Grandfather     Heart Disease Paternal Grandfather         heart disease    Other Cancer Paternal Half-Brother        Review of Systems   The 10 point Review of Systems is negative    Physical Exam   Temp: 97.6  F (36.4  C) Temp src: Temporal BP: 115/78 Pulse: 86   Resp: 18 SpO2: 95 % O2 Device: None (Room air)    Vital Signs with Ranges  Temp:  [97.4  F (36.3  C)-98.3  F (36.8  C)] 97.6  F (36.4  C)  Pulse:  [55-96] 86  Resp:  [18-21] 18  BP: (115-154)/() 115/78  SpO2:  [93 %-95 %] 95 %  220 lbs 0 oz  Body mass index is 35.51 kg/m .    GENERAL APPEARANCE:  awake interactive, not confused  EYES: Eyes grossly normal to inspection  NECK: no adenopathy  RESP: lungs clear   CV: regular rates and rhythm  LYMPHATICS: normal ant/post cervical and supraclavicular nodes  ABDOMEN: soft, mildly tender  MS: extremities normal  SKIN: no suspicious lesions or rashes        Data   All laboratory and imaging data in the past 24 hours reviewed  No results for input(s): \"CULT\" in the last 168 hours.  Recent Labs   Lab Test 01/18/19  1538 12/07/18  1203 11/29/18  1644 " 10/13/18  1705 01/27/17  0935   CULT No beta hemolytic Streptococcus Group A isolated No beta hemolytic Streptococcus Group A isolated No beta hemolytic Streptococcus Group A isolated 50,000 to 100,000 colonies/mL  mixed urogenital elgin  Susceptibility testing not routinely done   >100,000 colonies/mL Coagulase negative Staphylococcus  10,000 to 50,000 colonies/mL Pseudomonas aeruginosa  *          All cultures:  Recent Labs   Lab 10/12/24  2347   CULTURE 10,000-50,000 CFU/mL Mixture of Urogenital [place]      Blood culture:  No results found. However, due to the size of the patient record, not all encounters were searched. Please check Results Review for a complete set of results.   Urine culture:  Results for orders placed or performed during the hospital encounter of 10/12/24   Urine Culture    Specimen: Urine, Clean Catch   Result Value Ref Range    Culture 10,000-50,000 CFU/mL Mixture of Urogenital [place]    Results for orders placed or performed during the hospital encounter of 09/24/24   Urine Culture    Specimen: Urine, Styles Catheter   Result Value Ref Range    Culture 10,000-50,000 CFU/mL Escherichia coli (A)        Susceptibility    Escherichia coli - JUSTIN     Ampicillin >=32 Resistant ug/mL     Ampicillin/ Sulbactam 16 Intermediate ug/mL     Piperacillin/Tazobactam <=4 Susceptible ug/mL     Cefazolin* <=4 Susceptible ug/mL      * Cefazolin JUSTIN breakpoints are for the treatment of uncomplicated urinary tract infections. For the treatment of systemic infections, please contact the laboratory for additional testing.     Cefoxitin <=4 Susceptible ug/mL     Ceftazidime <=1 Susceptible ug/mL     Ceftriaxone <=1 Susceptible ug/mL     Cefepime <=1 Susceptible ug/mL     Gentamicin <=1 Susceptible ug/mL     Tobramycin <=1 Susceptible ug/mL     Ciprofloxacin <=0.25 Susceptible ug/mL     Levofloxacin <=0.12 Susceptible ug/mL     Nitrofurantoin <=16 Susceptible ug/mL     Trimethoprim/Sulfamethoxazole >16/304 Resistant  ug/mL   Results for orders placed or performed during the hospital encounter of 03/25/24   Urine Culture    Specimen: Urine, Midstream   Result Value Ref Range    Culture 10,000-50,000 CFU/mL Mixture of Urogenital [place]    Urine Culture    Specimen: Urine, NOS   Result Value Ref Range    Culture <10,000 CFU/mL Mixture of Urogenital [place]    Results for orders placed or performed during the hospital encounter of 11/02/23   Urine Culture    Specimen: Urine, NOS   Result Value Ref Range    Culture 10,000-50,000 CFU/mL Mixture of Urogenital [place]    Results for orders placed or performed during the hospital encounter of 05/24/23   Urine Culture    Specimen: Urine, Midstream   Result Value Ref Range    Culture <10,000 CFU/mL Mixture of urogenital elgin      *Note: Due to a large number of results and/or encounters for the requested time period, some results have not been displayed. A complete set of results can be found in Results Review.

## 2024-10-18 LAB
GLUCOSE BLDC GLUCOMTR-MCNC: 107 MG/DL (ref 70–99)
GLUCOSE BLDC GLUCOMTR-MCNC: 118 MG/DL (ref 70–99)
GLUCOSE BLDC GLUCOMTR-MCNC: 128 MG/DL (ref 70–99)
GLUCOSE BLDC GLUCOMTR-MCNC: 142 MG/DL (ref 70–99)
GLUCOSE BLDC GLUCOMTR-MCNC: 166 MG/DL (ref 70–99)

## 2024-10-18 PROCEDURE — 99232 SBSQ HOSP IP/OBS MODERATE 35: CPT | Performed by: INTERNAL MEDICINE

## 2024-10-18 PROCEDURE — 250N000013 HC RX MED GY IP 250 OP 250 PS 637: Performed by: INTERNAL MEDICINE

## 2024-10-18 PROCEDURE — A9270 NON-COVERED ITEM OR SERVICE: HCPCS | Performed by: EMERGENCY MEDICINE

## 2024-10-18 PROCEDURE — 250N000013 HC RX MED GY IP 250 OP 250 PS 637: Performed by: EMERGENCY MEDICINE

## 2024-10-18 PROCEDURE — 250N000011 HC RX IP 250 OP 636: Performed by: INTERNAL MEDICINE

## 2024-10-18 PROCEDURE — 250N000013 HC RX MED GY IP 250 OP 250 PS 637: Performed by: STUDENT IN AN ORGANIZED HEALTH CARE EDUCATION/TRAINING PROGRAM

## 2024-10-18 PROCEDURE — 120N000001 HC R&B MED SURG/OB

## 2024-10-18 PROCEDURE — 258N000003 HC RX IP 258 OP 636: Performed by: INTERNAL MEDICINE

## 2024-10-18 PROCEDURE — 250N000013 HC RX MED GY IP 250 OP 250 PS 637: Performed by: HOSPITALIST

## 2024-10-18 RX ORDER — OXYCODONE HYDROCHLORIDE 5 MG/1
5 TABLET ORAL EVERY 6 HOURS PRN
Status: DISCONTINUED | OUTPATIENT
Start: 2024-10-18 | End: 2024-10-22 | Stop reason: HOSPADM

## 2024-10-18 RX ORDER — OXYCODONE HYDROCHLORIDE 5 MG/1
10 TABLET ORAL EVERY 6 HOURS PRN
Status: DISCONTINUED | OUTPATIENT
Start: 2024-10-18 | End: 2024-10-22 | Stop reason: HOSPADM

## 2024-10-18 RX ORDER — KETOROLAC TROMETHAMINE 30 MG/ML
30 INJECTION, SOLUTION INTRAMUSCULAR; INTRAVENOUS EVERY 6 HOURS PRN
Status: DISCONTINUED | OUTPATIENT
Start: 2024-10-18 | End: 2024-10-22 | Stop reason: HOSPADM

## 2024-10-18 RX ORDER — NALOXONE HYDROCHLORIDE 0.4 MG/ML
0.4 INJECTION, SOLUTION INTRAMUSCULAR; INTRAVENOUS; SUBCUTANEOUS
Status: DISCONTINUED | OUTPATIENT
Start: 2024-10-18 | End: 2024-10-22 | Stop reason: HOSPADM

## 2024-10-18 RX ORDER — NALOXONE HYDROCHLORIDE 0.4 MG/ML
0.2 INJECTION, SOLUTION INTRAMUSCULAR; INTRAVENOUS; SUBCUTANEOUS
Status: DISCONTINUED | OUTPATIENT
Start: 2024-10-18 | End: 2024-10-22 | Stop reason: HOSPADM

## 2024-10-18 RX ADMIN — PALIPERIDONE 1.5 MG: 1.5 TABLET, EXTENDED RELEASE ORAL at 09:31

## 2024-10-18 RX ADMIN — NICOTINE POLACRILEX 4 MG: 2 GUM, CHEWING ORAL at 18:09

## 2024-10-18 RX ADMIN — NICOTINE 1 PATCH: 14 PATCH, EXTENDED RELEASE TRANSDERMAL at 08:35

## 2024-10-18 RX ADMIN — KETOROLAC TROMETHAMINE 30 MG: 30 INJECTION, SOLUTION INTRAMUSCULAR at 23:34

## 2024-10-18 RX ADMIN — NICOTINE POLACRILEX 4 MG: 2 GUM, CHEWING ORAL at 08:35

## 2024-10-18 RX ADMIN — TESTOSTERONE 100 MG OF TESTOSTERONE: 50 GEL TRANSDERMAL at 08:36

## 2024-10-18 RX ADMIN — NICOTINE POLACRILEX 4 MG: 2 GUM, CHEWING ORAL at 23:34

## 2024-10-18 RX ADMIN — VANCOMYCIN HYDROCHLORIDE 125 MG: 125 CAPSULE ORAL at 12:14

## 2024-10-18 RX ADMIN — ACETAMINOPHEN 650 MG: 325 TABLET, FILM COATED ORAL at 11:06

## 2024-10-18 RX ADMIN — CYCLOBENZAPRINE 10 MG: 10 TABLET, FILM COATED ORAL at 09:31

## 2024-10-18 RX ADMIN — NICOTINE POLACRILEX 4 MG: 2 GUM, CHEWING ORAL at 09:35

## 2024-10-18 RX ADMIN — KETOROLAC TROMETHAMINE 30 MG: 30 INJECTION, SOLUTION INTRAMUSCULAR at 12:14

## 2024-10-18 RX ADMIN — ONDANSETRON 4 MG: 4 TABLET, ORALLY DISINTEGRATING ORAL at 18:42

## 2024-10-18 RX ADMIN — VANCOMYCIN HYDROCHLORIDE 125 MG: 125 CAPSULE ORAL at 15:27

## 2024-10-18 RX ADMIN — ARIPIPRAZOLE 10 MG: 10 TABLET ORAL at 09:30

## 2024-10-18 RX ADMIN — ACETAMINOPHEN 650 MG: 325 TABLET, FILM COATED ORAL at 15:27

## 2024-10-18 RX ADMIN — SODIUM CHLORIDE: 9 INJECTION, SOLUTION INTRAVENOUS at 18:42

## 2024-10-18 RX ADMIN — EMPAGLIFLOZIN 10 MG: 10 TABLET, FILM COATED ORAL at 08:36

## 2024-10-18 RX ADMIN — OXYCODONE HYDROCHLORIDE 5 MG: 5 TABLET ORAL at 21:17

## 2024-10-18 RX ADMIN — QUETIAPINE 200 MG: 200 TABLET, FILM COATED ORAL at 21:17

## 2024-10-18 RX ADMIN — HYDROXYZINE HYDROCHLORIDE 25 MG: 25 TABLET ORAL at 11:07

## 2024-10-18 RX ADMIN — VANCOMYCIN HYDROCHLORIDE 125 MG: 125 CAPSULE ORAL at 08:36

## 2024-10-18 RX ADMIN — CLONAZEPAM 0.5 MG: 0.5 TABLET ORAL at 08:36

## 2024-10-18 RX ADMIN — PAROXETINE HYDROCHLORIDE 20 MG: 20 TABLET, FILM COATED ORAL at 09:31

## 2024-10-18 RX ADMIN — AMLODIPINE BESYLATE 10 MG: 10 TABLET ORAL at 08:37

## 2024-10-18 RX ADMIN — GABAPENTIN 1200 MG: 600 TABLET, FILM COATED ORAL at 08:36

## 2024-10-18 RX ADMIN — NICOTINE POLACRILEX 4 MG: 2 GUM, CHEWING ORAL at 06:43

## 2024-10-18 RX ADMIN — ACETAMINOPHEN 650 MG: 325 TABLET, FILM COATED ORAL at 06:42

## 2024-10-18 RX ADMIN — VANCOMYCIN HYDROCHLORIDE 125 MG: 125 CAPSULE ORAL at 21:18

## 2024-10-18 RX ADMIN — NICOTINE POLACRILEX 4 MG: 2 GUM, CHEWING ORAL at 13:57

## 2024-10-18 RX ADMIN — NICOTINE POLACRILEX 4 MG: 2 GUM, CHEWING ORAL at 11:07

## 2024-10-18 RX ADMIN — OXYCODONE HYDROCHLORIDE 5 MG: 5 TABLET ORAL at 15:27

## 2024-10-18 RX ADMIN — GABAPENTIN 1200 MG: 600 TABLET, FILM COATED ORAL at 21:18

## 2024-10-18 RX ADMIN — ENOXAPARIN SODIUM 40 MG: 40 INJECTION SUBCUTANEOUS at 21:17

## 2024-10-18 RX ADMIN — PALIPERIDONE 3 MG: 3 TABLET, EXTENDED RELEASE ORAL at 09:32

## 2024-10-18 RX ADMIN — HYDROXYZINE HYDROCHLORIDE 25 MG: 25 TABLET ORAL at 23:34

## 2024-10-18 RX ADMIN — NICOTINE POLACRILEX 4 MG: 2 GUM, CHEWING ORAL at 15:26

## 2024-10-18 RX ADMIN — ROSUVASTATIN CALCIUM 40 MG: 20 TABLET, FILM COATED ORAL at 08:36

## 2024-10-18 RX ADMIN — SODIUM CHLORIDE: 9 INJECTION, SOLUTION INTRAVENOUS at 09:34

## 2024-10-18 RX ADMIN — CYCLOBENZAPRINE 10 MG: 10 TABLET, FILM COATED ORAL at 18:08

## 2024-10-18 RX ADMIN — CLONAZEPAM 0.5 MG: 0.5 TABLET ORAL at 13:57

## 2024-10-18 ASSESSMENT — ACTIVITIES OF DAILY LIVING (ADL)
ADLS_ACUITY_SCORE: 24
ADLS_ACUITY_SCORE: 25
ADLS_ACUITY_SCORE: 24

## 2024-10-18 NOTE — PLAN OF CARE
"Goal Outcome Evaluation:      Plan of Care Reviewed With: patient    Overall Patient Progress: no changeOverall Patient Progress: no change    Outcome Evaluation: Pt requested flexeryl, Tyelnol for R 5 th finger pain, effective. Pt had 2 loose stools during night. on oral Vanco, sitter at the bedsite. Pt has no suicidal thoughts.      Problem: Adult Inpatient Plan of Care  Goal: Plan of Care Review  Description: The Plan of Care Review/Shift note should be completed every shift.  The Outcome Evaluation is a brief statement about your assessment that the patient is improving, declining, or no change.  This information will be displayed automatically on your shift  note.  Outcome: Progressing  Flowsheets (Taken 10/18/2024 0801)  Outcome Evaluation: Pt requested flexeryl, Tyelnol for R 5 th finger pain, effective. Pt had 2 loose stools during night. on oral Vanco, sitter at the bedsite. Pt has no suicidal thoughts.  Plan of Care Reviewed With: patient  Overall Patient Progress: no change  Goal: Patient-Specific Goal (Individualized)  Description: You can add care plan individualizations to a care plan. Examples of Individualization might be:  \"Parent requests to be called daily at 9am for status\", \"I have a hard time hearing out of my right ear\", or \"Do not touch me to wake me up as it startles  me\".  Outcome: Progressing  Goal: Absence of Hospital-Acquired Illness or Injury  Outcome: Progressing  Intervention: Identify and Manage Fall Risk  Recent Flowsheet Documentation  Taken 10/18/2024 0109 by Esme Faust, RN  Safety Promotion/Fall Prevention:   activity supervised   safety round/check completed  Taken 10/17/2024 2137 by Esme Faust, RN  Safety Promotion/Fall Prevention:   activity supervised   safety round/check completed  Goal: Optimal Comfort and Wellbeing  Outcome: Progressing  Goal: Readiness for Transition of Care  Outcome: Progressing     Problem: Seclusion/Restraint, Behavioral  Goal: Seclusion/Behavioral " Restraint Goal: Absence of Harm or Injury  Outcome: Progressing     Problem: Suicide Risk  Goal: Absence of Self-Harm  Outcome: Progressing  Intervention: Assess Risk to Self and Maintain Safety  Recent Flowsheet Documentation  Taken 10/18/2024 0109 by Esme Faust, RN  Enhanced Safety Measures:  at bedside  Taken 10/17/2024 2137 by Esme Faust, RN  Enhanced Safety Measures:  at bedside

## 2024-10-18 NOTE — PLAN OF CARE
"Goal Outcome Evaluation:      Plan of Care Reviewed With: patient    Overall Patient Progress: no changeOverall Patient Progress: no change    Outcome Evaluation: Denies suicidal ideation. Psych and hospitalist opting to keep sitter r/t previous suicidal ideation/actions. Patient reported 10 loose stools and nausea/stomach pain. ID started oral vanco. Ordered enteric panel, but cancelled per lab protocol. Friend of patient delivered bag. Friend consented to search. Contained sharp items, medication, coloring supplies and computer. Coloring supplies and computer given to patient. Rest sent home with friend.      Problem: Adult Inpatient Plan of Care  Goal: Plan of Care Review  Description: The Plan of Care Review/Shift note should be completed every shift.  The Outcome Evaluation is a brief statement about your assessment that the patient is improving, declining, or no change.  This information will be displayed automatically on your shift  note.  Outcome: Progressing  Flowsheets (Taken 10/17/2024 1928)  Outcome Evaluation: Denies suicidal ideation. Psych and hospitalist opting to keep sitter r/t previous suicidal ideation/actions. Patient reported 10 loose stools and nausea/stomach pain. ID started oral vanco. Ordered enteric panel, but cancelled per lab protocol. Friend of patient delivered bag. Friend consented to search. Contained sharp items, medication, coloring supplies and computer. Coloring supplies and computer given to patient. Rest sent home with friend.  Plan of Care Reviewed With: patient  Overall Patient Progress: no change  Goal: Patient-Specific Goal (Individualized)  Description: You can add care plan individualizations to a care plan. Examples of Individualization might be:  \"Parent requests to be called daily at 9am for status\", \"I have a hard time hearing out of my right ear\", or \"Do not touch me to wake me up as it startles  me\".  Outcome: Progressing  Goal: Absence of Hospital-Acquired Illness " or Injury  Outcome: Progressing  Intervention: Identify and Manage Fall Risk  Recent Flowsheet Documentation  Taken 10/17/2024 1400 by Rodri Alcantara RN  Safety Promotion/Fall Prevention:   activity supervised   safety round/check completed  Goal: Optimal Comfort and Wellbeing  Outcome: Progressing  Intervention: Monitor Pain and Promote Comfort  Recent Flowsheet Documentation  Taken 10/17/2024 1617 by Rodri Alcantara RN  Pain Management Interventions: ambulation/increased activity  Taken 10/17/2024 0915 by Rodri Alcantara RN  Pain Management Interventions: MD notified (comment)  Goal: Readiness for Transition of Care  Outcome: Progressing     Problem: Seclusion/Restraint, Behavioral  Goal: Seclusion/Behavioral Restraint Goal: Absence of Harm or Injury  Outcome: Progressing     Problem: Suicide Risk  Goal: Absence of Self-Harm  Outcome: Progressing  Intervention: Assess Risk to Self and Maintain Safety  Recent Flowsheet Documentation  Taken 10/17/2024 1400 by Rodri Alcantara RN  Enhanced Safety Measures:  at bedside  Taken 10/17/2024 1102 by Rodri Alcantara RN  Behavior Management:   behavioral plan developed   behavioral plan reviewed   boundaries reinforced   security enhancements provided  Self-Harm Prevention:   environmental self-harm risks assessed   environment modified for self-harm risk   observed one-to-one  Enhanced Safety Measures:   pain management    at bedside  Intervention: Promote Psychosocial Wellbeing  Recent Flowsheet Documentation  Taken 10/17/2024 1102 by Rodri Alcantara RN  Sleep/Rest Enhancement:   comfort measures   medication   noise level reduced   natural light exposure provided  Family/Support System Care: self-care encouraged

## 2024-10-18 NOTE — PROGRESS NOTES
Hospitalist Medicine Progress Note   [facility]       Prakash Prasad is a 34 year old adult with ADHD, bipolar 1 disorder, schizoaffective disorder,  borderline personality disorder, depression, type 2 diabetes mellitus, GERD, history of TBI, hypertension, obesity, substance abuse in the past with methamphetamine, Heroin but now in remission except takes medicinal marijuana, PTSD who was admitted to [facility] on 10/12/2024 after drug overdose with 20 pills of 400 mg gabapentin and 10 Klonopin few hours before arriving in a suicidal attempt.  Patient was awaiting transfer to a close psych unit when developed diarrhea with nausea and C. difficile antigen was positive with C. difficile toxin negative.  ID was consulted and given the patient's symptomatology oral vancomycin was started on 10/17/2024.   Patient also has right nondisplaced fracture of the base of the fifth metatarsal that was sustained on 10/14/2024 after patient punched the wall.          Date of Admission:  10/12/2024  Assessment & Plan   Acute diarrhea  In a patient who was on antibiotics recently and a month ago  Patient had about 4-5 stools today which were loose  The C. difficile antigen is positive with C. difficile toxin being negative  At this time it was decided to be treated with oral vancomycin that was started on 10/17/2024  Appreciate ID consult and management  Will discontinue IV fluids now that the patient is eating  Patient had increased in loose stool frequency 10/17/2024 but the frequency is decreasing and the consistency is becoming more firmer today but if the frequency does not improve gastroenterology consultation will be done  Today we will check a stool for enteric pathogens     Type 2 diabetes mellitus  Will check sugars 4 times daily and give sliding scale insulin  Continue empagliflozin 10 mg daily     Suicidal ideation  Schizoaffective disorder  Borderline personality  "disorder  Depression  PTSD  TBI  Management as per psychiatry  Psychiatry is consulted     GERD  Will not treat GERD at this time with no symptoms     Hypertension  Continue amlodipine 5 mg daily but increase the dose to 10 mg daily as the blood pressure is still elevated at 144/100     Fracture base of right fifth metacarpal  Pain management oxycodone that the patient was taking before was restarted as he has throbbing pain in the right hand            Plan:   Check enteric pathogens  Consult gastroenterology if the diarrhea is not improving  Check CBC and BMP  Discontinue IV fluids in the morning as the patient had 6 loose stools today      Diet: Regular Diet Adult    DVT Prophylaxis: Enoxaparin (Lovenox) SQ  Styles Catheter: Not present  Code Status: Full Code         Medically Ready for Discharge: 2-4 days - when a Psychiatry  bed is found - otherwise under court commitment     Clinically Significant Risk Factors                   # Hypertension: Noted on problem list          # DMII: A1C = 7.4 % (Ref range: 0.0 - 5.6 %) within past 6 months, PRESENT ON ADMISSION  # Obesity: Estimated body mass index is 35.51 kg/m  as calculated from the following:    Height as of this encounter: 1.676 m (5' 6\").    Weight as of this encounter: 99.8 kg (220 lb)., PRESENT ON ADMISSION       # Financial/Environmental Concerns: none               The patient's care was discussed with the Patient and RN.    Osbaldo Davis MD  Hospitalist Service  [facility]    ______________________________________________________________________    Interval History     Symptoms   Diarrhea is somewhat better today with only 6 stools as compared to 12 yesterday and the liquid stools are becoming much more firm according to the patient    Review of Systems:   Nausea without vomiting      Data reviewed today: I reviewed all medications, new labs and imaging results over the last 24 hours.     Physical Exam   Vital Signs: Temp: 98.3 "  F (36.8  C) Temp src: Temporal BP: 129/78 Pulse: 94   Resp: 16 SpO2: 96 % O2 Device: None (Room air)    Weight: 220 lbs 0 oz    GENERAL: Patient does not look in any acute distress  HEENT: EOM+, Conjunctiva is clear   NECK:  no Jugular Venous distention  HEART: S1 S2 regular rate and rhythm  LUNGS: Respirations are  not laboured, Lungs are clear to auscultation without Crepitations or Wheezing   ABDOMEN: Soft, there is no tenderness,  Bowel Sounds are Positive   LOWER LIMBS: no Pedal Edema  Bilaterally right upper extremity in a dressing wrap  CNS:  Alert,  Oriented x 3, Moving all the Four Limb      Data   Recent Labs   Lab 10/18/24  1147 10/18/24  0724 10/18/24  0223 10/17/24  0720 10/17/24  0549 10/15/24  1124 10/12/24  2056 10/12/24  2055   WBC  --   --   --   --  7.5  --  14.6*  --    HGB  --   --   --   --  15.0  --  15.9  --    MCV  --   --   --   --  86  --  84  --    PLT  --   --   --   --  244  --  295  --    INR  --   --   --   --   --   --   --  1.01   NA  --   --   --   --  139  --   --  137   POTASSIUM  --   --   --   --  4.3  --   --  3.8   CHLORIDE  --   --   --   --  104  --   --  100   CO2  --   --   --   --  18*  --   --  22   BUN  --   --   --   --  12.9  --   --  11.1   CR  --   --   --   --  0.66  --   --  0.76   ANIONGAP  --   --   --   --  17*  --   --  15   WILLIAMS  --   --   --   --  9.1  --   --  9.9   * 166* 118*   < > 185*   < >  --  141*   ALBUMIN  --   --   --   --   --   --   --  5.0   PROTTOTAL  --   --   --   --   --   --   --  8.3   BILITOTAL  --   --   --   --   --   --   --  0.3   ALKPHOS  --   --   --   --   --   --   --  88   ALT  --   --   --   --   --   --   --  52   AST  --   --   --   --   --   --   --  52*    < > = values in this interval not displayed.         No results found for this or any previous visit (from the past 24 hour(s)).

## 2024-10-18 NOTE — PROGRESS NOTES
[facility]  Infectious Disease Progress Note          Assessment and Plan:   Date of Admission:  10/12/2024  Date of Consult (When I saw the patient): 10/17/24        Assessment & Plan  Prakash Prasad is a 34 year old adult who was admitted on 10/12/2024.      Impression: 1 34-year-old male, housed in ER for several days awaiting psychiatric disposition with multiple underlying psychiatric issues  2 mildly abnormal urinalysis given Keflex, urine culture eventually unremarkable and not obvious true UTI  3 acute diarrhea, multiple loose stools continuing, crampy abdominal pain, C. difficile PCR positive but toxin negative implying colonization, also of interest #4 below, however hard to exclude that this is true C. difficile colitis from the current antibiotic course  4 prior C. difficile colitis in 2018 until 2020, even then a typical pattern a typical response to treatment, consideration was colonization rather than true infection and then eventually resolved and does not have ongoing chronic diarrhea     REC 1 hard to exclude this is not early true C. difficile given ongoing diarrhea , and major disposition from this is going to create for now I think we treat, will do Vanco, get an enteric panel as well given is not clear the diagnosis actually C. difficile.  If he has continued crampy abdominal pain and nonresolving diarrhea question CT scan and look for other causes here           Interval History:     no new complaints somewhat better some crampy abdominal pain but less, still loose stools but fewer than previously, no major fever or systemic symptoms no new positive studies              Medications:     Current Facility-Administered Medications   Medication Dose Route Frequency Provider Last Rate Last Admin    amLODIPine (NORVASC) tablet 10 mg  10 mg Oral Daily Osbaldo Davis MD   10 mg at 10/18/24 0837    ARIPiprazole (ABILIFY) tablet 10 mg  10 mg Oral Kwame Camarillo MD    "10 mg at 10/18/24 0930    empagliflozin (JARDIANCE) tablet 10 mg  10 mg Oral Daily Kwame Encarnacion MD   10 mg at 10/18/24 0836    enoxaparin ANTICOAGULANT (LOVENOX) injection 40 mg  40 mg Subcutaneous Q24H Osbaldo Davis MD   40 mg at 10/17/24 2004    gabapentin (NEURONTIN) tablet 1,200 mg  1,200 mg Oral BID Osbaldo Davis MD   1,200 mg at 10/18/24 0836    insulin aspart (NovoLOG) injection (RAPID ACTING)  1-7 Units Subcutaneous TID AC Osbaldo Davis MD   1 Units at 10/18/24 0843    insulin aspart (NovoLOG) injection (RAPID ACTING)  1-5 Units Subcutaneous At Bedtime Osbaldo Davis MD        nicotine (NICODERM CQ) 14 MG/24HR 24 hr patch 1 patch  1 patch Transdermal Daily Hardik Doran DO   1 patch at 10/18/24 0835    OLANZapine (zyPREXA) injection 10 mg  10 mg Intramuscular Once Marta Mays MD        paliperidone (INVEGA) 24 hr tablet 1.5 mg  1.5 mg Oral QAM Kwame Encarnacion MD   1.5 mg at 10/18/24 0931    paliperidone ER (INVEGA) 24 hr tablet 3 mg  3 mg Oral QAM Kwame Encarnacion MD   3 mg at 10/18/24 0932    PARoxetine (PAXIL) tablet 20 mg  20 mg Oral Daily Kwame Encarnacion MD   20 mg at 10/18/24 0931    QUEtiapine (SEROquel) tablet 200 mg  200 mg Oral At Bedtime Tank Durbin MD   200 mg at 10/17/24 2212    rosuvastatin (CRESTOR) tablet 40 mg  40 mg Oral Daily Kwame Encarnacion MD   40 mg at 10/18/24 0836    testosterone (ANDROGEL/TESTIM) 50 MG/5GM (1%) topical gel 100 mg of testosterone  100 mg of testosterone Transdermal Daily Kwame Encarnacion MD   100 mg of testosterone at 10/18/24 0836    vancomycin (VANCOCIN) capsule 125 mg  125 mg Oral 4x Daily Lamine Vieyra MD   125 mg at 10/18/24 1214                  Physical Exam:   Blood pressure 129/78, pulse 94, temperature 98.3  F (36.8  C), temperature source Temporal, resp. rate 16, height 1.676 m (5' 6\"), weight 99.8 kg (220 lb), SpO2 96%, not currently breastfeeding.  Wt Readings from Last 2 " Encounters:   10/16/24 99.8 kg (220 lb)   10/02/24 105.9 kg (233 lb 6.4 oz)     Vital Signs with Ranges  Temp:  [97.6  F (36.4  C)-98.3  F (36.8  C)] 98.3  F (36.8  C)  Pulse:  [87-94] 94  Resp:  [16-20] 16  BP: (121-132)/(76-80) 129/78  SpO2:  [92 %-96 %] 96 %    Constitutional: Awake, alert, cooperative, no apparent distress     Lungs: Clear to auscultation bilaterally, no crackles or wheezing   Cardiovascular: Regular rate and rhythm, normal S1 and S2, and no murmur noted   Abdomen: Normal bowel sounds, soft, non-distended, non-tender   Skin: No rashes, no cyanosis, no edema   Other:           Data:   All microbiology laboratory data reviewed.  Recent Labs   Lab Test 10/17/24  0549 10/12/24  2056 09/26/24  0814   WBC 7.5 14.6* 9.7   HGB 15.0 15.9 14.1   HCT 46.0 47.6 42.6   MCV 86 84 87    295 239     Recent Labs   Lab Test 10/17/24  0549 10/12/24  2055 09/26/24  0814   CR 0.66 0.76 0.76     No lab results found.  Recent Labs   Lab Test 01/18/19  1538 12/07/18  1203 11/29/18  1644 10/13/18  1705 01/27/17  0935   CULT No beta hemolytic Streptococcus Group A isolated No beta hemolytic Streptococcus Group A isolated No beta hemolytic Streptococcus Group A isolated 50,000 to 100,000 colonies/mL  mixed urogenital elgin  Susceptibility testing not routinely done   >100,000 colonies/mL Coagulase negative Staphylococcus  10,000 to 50,000 colonies/mL Pseudomonas aeruginosa  *

## 2024-10-19 ENCOUNTER — APPOINTMENT (OUTPATIENT)
Dept: CT IMAGING | Facility: CLINIC | Age: 34
DRG: 918 | End: 2024-10-19
Attending: STUDENT IN AN ORGANIZED HEALTH CARE EDUCATION/TRAINING PROGRAM
Payer: MEDICARE

## 2024-10-19 LAB
ANION GAP SERPL CALCULATED.3IONS-SCNC: 13 MMOL/L (ref 7–15)
BUN SERPL-MCNC: 14.7 MG/DL (ref 6–20)
CALCIUM SERPL-MCNC: 9.6 MG/DL (ref 8.8–10.4)
CHLORIDE SERPL-SCNC: 102 MMOL/L (ref 98–107)
CREAT SERPL-MCNC: 0.71 MG/DL (ref 0.51–1.17)
EGFRCR SERPLBLD CKD-EPI 2021: >90 ML/MIN/1.73M2
GLUCOSE BLDC GLUCOMTR-MCNC: 109 MG/DL (ref 70–99)
GLUCOSE BLDC GLUCOMTR-MCNC: 126 MG/DL (ref 70–99)
GLUCOSE BLDC GLUCOMTR-MCNC: 173 MG/DL (ref 70–99)
GLUCOSE BLDC GLUCOMTR-MCNC: 182 MG/DL (ref 70–99)
GLUCOSE BLDC GLUCOMTR-MCNC: 193 MG/DL (ref 70–99)
GLUCOSE SERPL-MCNC: 129 MG/DL (ref 70–99)
HCO3 SERPL-SCNC: 23 MMOL/L (ref 22–29)
PLATELET # BLD AUTO: 245 10E3/UL (ref 150–450)
POTASSIUM SERPL-SCNC: 4.1 MMOL/L (ref 3.4–5.3)
SODIUM SERPL-SCNC: 138 MMOL/L (ref 135–145)

## 2024-10-19 PROCEDURE — 250N000013 HC RX MED GY IP 250 OP 250 PS 637: Performed by: INTERNAL MEDICINE

## 2024-10-19 PROCEDURE — 250N000009 HC RX 250: Performed by: STUDENT IN AN ORGANIZED HEALTH CARE EDUCATION/TRAINING PROGRAM

## 2024-10-19 PROCEDURE — 99232 SBSQ HOSP IP/OBS MODERATE 35: CPT | Performed by: STUDENT IN AN ORGANIZED HEALTH CARE EDUCATION/TRAINING PROGRAM

## 2024-10-19 PROCEDURE — 99232 SBSQ HOSP IP/OBS MODERATE 35: CPT | Performed by: INTERNAL MEDICINE

## 2024-10-19 PROCEDURE — 36415 COLL VENOUS BLD VENIPUNCTURE: CPT | Performed by: INTERNAL MEDICINE

## 2024-10-19 PROCEDURE — 250N000011 HC RX IP 250 OP 636: Performed by: STUDENT IN AN ORGANIZED HEALTH CARE EDUCATION/TRAINING PROGRAM

## 2024-10-19 PROCEDURE — 74177 CT ABD & PELVIS W/CONTRAST: CPT | Mod: MG

## 2024-10-19 PROCEDURE — 250N000013 HC RX MED GY IP 250 OP 250 PS 637: Performed by: EMERGENCY MEDICINE

## 2024-10-19 PROCEDURE — 85049 AUTOMATED PLATELET COUNT: CPT | Performed by: INTERNAL MEDICINE

## 2024-10-19 PROCEDURE — 250N000013 HC RX MED GY IP 250 OP 250 PS 637: Performed by: STUDENT IN AN ORGANIZED HEALTH CARE EDUCATION/TRAINING PROGRAM

## 2024-10-19 PROCEDURE — A9270 NON-COVERED ITEM OR SERVICE: HCPCS | Performed by: EMERGENCY MEDICINE

## 2024-10-19 PROCEDURE — 80048 BASIC METABOLIC PNL TOTAL CA: CPT | Performed by: INTERNAL MEDICINE

## 2024-10-19 PROCEDURE — 250N000011 HC RX IP 250 OP 636: Performed by: INTERNAL MEDICINE

## 2024-10-19 PROCEDURE — 120N000001 HC R&B MED SURG/OB

## 2024-10-19 PROCEDURE — 250N000013 HC RX MED GY IP 250 OP 250 PS 637: Performed by: HOSPITALIST

## 2024-10-19 PROCEDURE — 258N000003 HC RX IP 258 OP 636: Performed by: INTERNAL MEDICINE

## 2024-10-19 RX ORDER — IOPAMIDOL 755 MG/ML
100 INJECTION, SOLUTION INTRAVASCULAR ONCE
Status: COMPLETED | OUTPATIENT
Start: 2024-10-19 | End: 2024-10-19

## 2024-10-19 RX ORDER — DIPHENHYDRAMINE HCL 25 MG
25 CAPSULE ORAL EVERY 6 HOURS PRN
Status: DISCONTINUED | OUTPATIENT
Start: 2024-10-19 | End: 2024-10-22 | Stop reason: HOSPADM

## 2024-10-19 RX ADMIN — CLONAZEPAM 0.5 MG: 0.5 TABLET ORAL at 12:33

## 2024-10-19 RX ADMIN — ENOXAPARIN SODIUM 40 MG: 40 INJECTION SUBCUTANEOUS at 21:17

## 2024-10-19 RX ADMIN — ROSUVASTATIN CALCIUM 40 MG: 20 TABLET, FILM COATED ORAL at 08:43

## 2024-10-19 RX ADMIN — ACETAMINOPHEN 650 MG: 325 TABLET, FILM COATED ORAL at 05:29

## 2024-10-19 RX ADMIN — HYDROXYZINE HYDROCHLORIDE 25 MG: 25 TABLET ORAL at 10:20

## 2024-10-19 RX ADMIN — NICOTINE POLACRILEX 4 MG: 2 GUM, CHEWING ORAL at 18:31

## 2024-10-19 RX ADMIN — CLONAZEPAM 0.5 MG: 0.5 TABLET ORAL at 21:17

## 2024-10-19 RX ADMIN — PALIPERIDONE 3 MG: 3 TABLET, EXTENDED RELEASE ORAL at 10:15

## 2024-10-19 RX ADMIN — NICOTINE POLACRILEX 4 MG: 2 GUM, CHEWING ORAL at 11:37

## 2024-10-19 RX ADMIN — NICOTINE POLACRILEX 4 MG: 2 GUM, CHEWING ORAL at 05:29

## 2024-10-19 RX ADMIN — IOPAMIDOL 100 ML: 755 INJECTION, SOLUTION INTRAVENOUS at 18:14

## 2024-10-19 RX ADMIN — HYDROXYZINE HYDROCHLORIDE 25 MG: 25 TABLET ORAL at 15:17

## 2024-10-19 RX ADMIN — OXYCODONE HYDROCHLORIDE 5 MG: 5 TABLET ORAL at 05:29

## 2024-10-19 RX ADMIN — CYCLOBENZAPRINE 10 MG: 10 TABLET, FILM COATED ORAL at 11:36

## 2024-10-19 RX ADMIN — SODIUM CHLORIDE 65 ML: 9 INJECTION, SOLUTION INTRAVENOUS at 18:14

## 2024-10-19 RX ADMIN — GABAPENTIN 1200 MG: 600 TABLET, FILM COATED ORAL at 08:42

## 2024-10-19 RX ADMIN — NICOTINE 1 PATCH: 14 PATCH, EXTENDED RELEASE TRANSDERMAL at 08:41

## 2024-10-19 RX ADMIN — ARIPIPRAZOLE 10 MG: 10 TABLET ORAL at 08:43

## 2024-10-19 RX ADMIN — PAROXETINE HYDROCHLORIDE 20 MG: 20 TABLET, FILM COATED ORAL at 08:43

## 2024-10-19 RX ADMIN — ACETAMINOPHEN 650 MG: 325 TABLET, FILM COATED ORAL at 10:20

## 2024-10-19 RX ADMIN — VANCOMYCIN HYDROCHLORIDE 125 MG: 125 CAPSULE ORAL at 11:36

## 2024-10-19 RX ADMIN — EMPAGLIFLOZIN 10 MG: 10 TABLET, FILM COATED ORAL at 08:43

## 2024-10-19 RX ADMIN — NICOTINE POLACRILEX 4 MG: 2 GUM, CHEWING ORAL at 12:33

## 2024-10-19 RX ADMIN — PALIPERIDONE 1.5 MG: 1.5 TABLET, EXTENDED RELEASE ORAL at 10:15

## 2024-10-19 RX ADMIN — OXYCODONE HYDROCHLORIDE 5 MG: 5 TABLET ORAL at 18:30

## 2024-10-19 RX ADMIN — OXYCODONE HYDROCHLORIDE 5 MG: 5 TABLET ORAL at 11:36

## 2024-10-19 RX ADMIN — GABAPENTIN 1200 MG: 600 TABLET, FILM COATED ORAL at 21:17

## 2024-10-19 RX ADMIN — AMLODIPINE BESYLATE 10 MG: 10 TABLET ORAL at 08:43

## 2024-10-19 RX ADMIN — NICOTINE POLACRILEX 4 MG: 2 GUM, CHEWING ORAL at 10:15

## 2024-10-19 RX ADMIN — NICOTINE POLACRILEX 4 MG: 2 GUM, CHEWING ORAL at 15:17

## 2024-10-19 RX ADMIN — NICOTINE POLACRILEX 4 MG: 2 GUM, CHEWING ORAL at 21:19

## 2024-10-19 RX ADMIN — TESTOSTERONE 100 MG OF TESTOSTERONE: 50 GEL TRANSDERMAL at 08:43

## 2024-10-19 RX ADMIN — ONDANSETRON 4 MG: 4 TABLET, ORALLY DISINTEGRATING ORAL at 18:30

## 2024-10-19 RX ADMIN — VANCOMYCIN HYDROCHLORIDE 125 MG: 125 CAPSULE ORAL at 08:42

## 2024-10-19 RX ADMIN — VANCOMYCIN HYDROCHLORIDE 125 MG: 125 CAPSULE ORAL at 15:17

## 2024-10-19 RX ADMIN — SODIUM CHLORIDE: 9 INJECTION, SOLUTION INTRAVENOUS at 04:47

## 2024-10-19 RX ADMIN — QUETIAPINE 200 MG: 200 TABLET, FILM COATED ORAL at 21:17

## 2024-10-19 RX ADMIN — ACETAMINOPHEN 650 MG: 325 TABLET, FILM COATED ORAL at 15:17

## 2024-10-19 RX ADMIN — VANCOMYCIN HYDROCHLORIDE 125 MG: 125 CAPSULE ORAL at 21:17

## 2024-10-19 ASSESSMENT — ACTIVITIES OF DAILY LIVING (ADL)
ADLS_ACUITY_SCORE: 24

## 2024-10-19 NOTE — PROGRESS NOTES
[facility]    Medicine Progress Note - Hospitalist Service    Date of Admission:  10/12/2024    Assessment & Plan     Prakash Prasad is a 34 year old adult with ADHD, bipolar 1 disorder, schizoaffective disorder,  borderline personality disorder, depression, type 2 diabetes mellitus, GERD, history of TBI, hypertension, obesity, substance abuse in the past with methamphetamine, Heroin but now in remission except takes medicinal marijuana, PTSD who was admitted to [facility] on 10/12/2024 after drug overdose with 20 pills of 400 mg gabapentin and 10 Klonopin few hours before arriving in a suicidal attempt.  Patient was awaiting transfer to a close psych unit when developed diarrhea with nausea and C. difficile antigen was positive with C. difficile toxin negative.  ID was consulted and given the patient's symptomatology oral vancomycin was started on 10/17/2024.     Psychiatry consulted, appreciated recommendation patient currently is on court ordered hold.  Case management consulted and following to assist with discharge planning.       Acute diarrhea  C. difficile testing more consistent with colonization  Patient was on antibiotics recently about a month ago.  Patient started having diarrhea about 4-8 small bowel movements every day, not formed stool.  Associated with cramping abdominal pain though no nausea or vomiting.  C. difficile testing was done which revealed positive antigen and negative toxin, more consistent with colonization rather than acute infection.  However given ongoing diarrhea and recent antibiotic use patient was started on oral vancomycin as per ID consultation.  Patient continues to have diarrhea and cramping abdominal pain.    -Continue p.o. vancomycin 125 mg 4 times daily  - CT abdomen and pelvis with and without contrast    Type 2 diabetes mellitus  -Sliding scale insulin at home empagliflozin 10 mg daily     Suicidal ideation  Schizoaffective  "disorder  Borderline personality disorder  Depression  PTSD  TBI  On court hold  -Psychiatry consulted, appreciated recommendations  - Continue Abilify 10 mg, paliperidone 1.5 mg every morning, paliperidone 3 mg in the evening, paroxetine 20 mg daily, quetiapine 200 mg at bedtime.   -Patient needs to be diarrhea free at least for 48 hours before transition to inpatient psych    Transgender male  Continue testosterone 100 mg transdermal gel daily     GERD  Will not treat GERD at this time with no symptoms     Hypertension  Amlodipine dose was increased to 10 mg daily this hospitalization     Fracture base of right fifth metacarpal  Pain management oxycodone that the patient was taking before was restarted as he has throbbing pain in the right hand          Diet: Regular Diet Adult    DVT Prophylaxis: Enoxaparin (Lovenox) SQ  Styles Catheter: Not present  Lines: None     Cardiac Monitoring: None  Code Status: Full Code      Clinically Significant Risk Factors                   # Hypertension: Noted on problem list          # DMII: A1C = 7.4 % (Ref range: 0.0 - 5.6 %) within past 6 months, PRESENT ON ADMISSION  # Obesity: Estimated body mass index is 35.51 kg/m  as calculated from the following:    Height as of this encounter: 1.676 m (5' 6\").    Weight as of this encounter: 99.8 kg (220 lb)., PRESENT ON ADMISSION     # Financial/Environmental Concerns: none         Disposition Plan     Medically Ready for Discharge: Anticipated Tomorrow             Chandni Davis MD  Hospitalist Service  [facility]  Securely message with [employer] (more info)  Text page via [employer] Paging/Directory   ______________________________________________________________________    Interval History   No acute events overnight.  Patient continues to have diarrhea.  This morning he has had 5 episodes.  He reported a small bowel movement though no formed stools.  Continues to have cramping abdominal pain.  No nausea or vomiting.  " Tolerating diet okay.     4 point review of systems otherwise negative    Physical Exam   Vital Signs: Temp: 98.4  F (36.9  C) Temp src: Temporal BP: 116/79 Pulse: 96   Resp: 18 SpO2: 94 % O2 Device: None (Room air)    Weight: 220 lbs 0 oz    Constitutional: awake, alert, cooperative, no apparent distress, and appears stated age  Eyes: Anicteric sclera  Respiratory: On room air, equal air entry bilaterally, no wheezing or crackles  Cardiovascular: Normal rate, regular rhythm, no murmur  GI: Soft, nontender, nondistended  Musculoskeletal: no lower extremity pitting edema present  Neurologic: Awake, alert, oriented to name, place and time.  No Focal deficit  Neuropsychiatric: Flat affect    Medical Decision Making       45 MINUTES SPENT BY ME on the date of service doing chart review, history, exam, documentation & further activities per the note.      Data   ------------------------- PAST 24 HR DATA REVIEWED -----------------------------------------------

## 2024-10-19 NOTE — PLAN OF CARE
"Brendan SI.  Pain improved with addition of PRN oxycodone & toradol, encouraged repo/relaxation.  Nauseated after supper, small emesis x1.  LS clear bilat., RA.  Up ad joshua., ambulating.  Multiple loose stools still, PO vanco.  DC plan pending psychiatry bed secure.      Goal Outcome Evaluation:    Plan of Care Reviewed With: patient    Overall Patient Progress: no changeOverall Patient Progress: no change    Outcome Evaluation: Denies SI.    Problem: Adult Inpatient Plan of Care  Goal: Plan of Care Review  Description: The Plan of Care Review/Shift note should be completed every shift.  The Outcome Evaluation is a brief statement about your assessment that the patient is improving, declining, or no change.  This information will be displayed automatically on your shift  note.  Outcome: Not Progressing  Flowsheets (Taken 10/18/2024 2015)  Outcome Evaluation: Denies SI.  Plan of Care Reviewed With: patient  Overall Patient Progress: no change  Goal: Patient-Specific Goal (Individualized)  Description: You can add care plan individualizations to a care plan. Examples of Individualization might be:  \"Parent requests to be called daily at 9am for status\", \"I have a hard time hearing out of my right ear\", or \"Do not touch me to wake me up as it startles  me\".  Outcome: Not Progressing  Goal: Absence of Hospital-Acquired Illness or Injury  Outcome: Not Progressing  Intervention: Identify and Manage Fall Risk  Recent Flowsheet Documentation  Taken 10/18/2024 0830 by Elina Rao RN  Safety Promotion/Fall Prevention:   bedside attendant   increase visualization of patient   activity supervised   supervised activity   safety round/check completed   room organization consistent   patient and family education   nonskid shoes/slippers when out of bed   lighting adjusted   clutter free environment maintained   assistive device/personal items within reach  Intervention: Prevent Skin Injury  Recent Flowsheet Documentation  Taken " 10/18/2024 0930 by Elina Rao RN  Body Position: position changed independently  Taken 10/18/2024 0836 by Elina Rao RN  Body Position: position changed independently  Taken 10/18/2024 0830 by Elina Rao RN  Skin Protection:   adhesive use limited   pulse oximeter probe site changed  Device Skin Pressure Protection:   tubing/devices free from skin contact   pressure points protected   positioning supports utilized   adhesive use limited   absorbent pad utilized/changed  Intervention: Prevent and Manage VTE (Venous Thromboembolism) Risk  Recent Flowsheet Documentation  Taken 10/18/2024 0830 by Elina Rao RN  VTE Prevention/Management:   patient refused intervention   SCDs off (sequential compression devices)  Intervention: Prevent Infection  Recent Flowsheet Documentation  Taken 10/18/2024 0830 by Elina Rao RN  Infection Prevention:   personal protective equipment utilized   hand hygiene promoted   equipment surfaces disinfected   environmental surveillance performed   rest/sleep promoted   single patient room provided  Goal: Optimal Comfort and Wellbeing  Outcome: Not Progressing  Intervention: Monitor Pain and Promote Comfort  Recent Flowsheet Documentation  Taken 10/18/2024 1808 by Elina Rao RN  Pain Management Interventions: medication (see MAR)  Taken 10/18/2024 1800 by Elina Rao RN  Pain Management Interventions: rest  Taken 10/18/2024 1620 by Elina Rao RN  Pain Management Interventions: rest  Taken 10/18/2024 1527 by Elina Rao RN  Pain Management Interventions: medication (see MAR)  Taken 10/18/2024 1330 by Elina Rao RN  Pain Management Interventions: rest  Taken 10/18/2024 1214 by Elina Rao RN  Pain Management Interventions: medication (see MAR)  Taken 10/18/2024 1106 by Elina Rao RN  Pain Management Interventions: medication (see MAR)  Taken 10/18/2024 1030 by Elina Rao RN  Pain Management Interventions: rest  Taken 10/18/2024 0931 by Elina Rao  H, RN  Pain Management Interventions: medication (see MAR)  Taken 10/18/2024 0930 by Elina Rao RN  Pain Management Interventions: rest  Taken 10/18/2024 0836 by Elina Rao RN  Pain Management Interventions: (declined cold pack)   pain management plan reviewed with patient/caregiver   medication (see MAR)   care clustered   emotional support   rest   relaxation techniques promoted   quiet environment facilitated   pillow support provided  Goal: Readiness for Transition of Care  Outcome: Not Progressing     Problem: Seclusion/Restraint, Behavioral  Goal: Seclusion/Behavioral Restraint Goal: Absence of Harm or Injury  Outcome: Not Progressing  Intervention: Protect Skin and Joint Integrity  Recent Flowsheet Documentation  Taken 10/18/2024 0930 by Elina Rao RN  Body Position: position changed independently  Taken 10/18/2024 0836 by Elina Rao RN  Body Position: position changed independently  Taken 10/18/2024 0830 by Elina Rao RN  Skin Protection:   adhesive use limited   pulse oximeter probe site changed     Problem: Suicide Risk  Goal: Absence of Self-Harm  Outcome: Not Progressing  Intervention: Assess Risk to Self and Maintain Safety  Recent Flowsheet Documentation  Taken 10/18/2024 0830 by Elina Rao RN  Behavior Management:   behavioral plan developed   behavioral plan reviewed   boundaries reinforced   security enhancements provided  Self-Harm Prevention:   environmental self-harm risks assessed   environment modified for self-harm risk   observed one-to-one  Enhanced Safety Measures:    at bedside   review medications for side effects with activity   patient/family teach back on injury risk   pain management   assistive devices when indicated  Intervention: Promote Psychosocial Wellbeing  Recent Flowsheet Documentation  Taken 10/18/2024 0830 by Elina Rao RN  Supportive Measures:   active listening utilized   decision-making supported   goal-setting facilitated    positive reinforcement provided   problem-solving facilitated   relaxation techniques promoted   self-care encouraged   self-reflection promoted   self-responsibility promoted   verbalization of feelings encouraged  Sleep/Rest Enhancement: relaxation techniques promoted

## 2024-10-19 NOTE — PLAN OF CARE
"Afebrile.  Diarrhea still, PO vanco.  CT abdomen pending.  Brendan SI.  Pain managed with PRN PO pain meds.  Anxiety managed with PRN klonopin, encouraged relaxation & coloring.  Nauseated after CT, improved with PRN zofran.  Up ad joshua., ambulating often.  Pt hoping DC home.     Goal Outcome Evaluation:    Plan of Care Reviewed With: patient    Overall Patient Progress: no changeOverall Patient Progress: no change    Outcome Evaluation: Denies SI.  Loos stools still.    Problem: Adult Inpatient Plan of Care  Goal: Plan of Care Review  Description: The Plan of Care Review/Shift note should be completed every shift.  The Outcome Evaluation is a brief statement about your assessment that the patient is improving, declining, or no change.  This information will be displayed automatically on your shift  note.  Outcome: Progressing  Flowsheets (Taken 10/19/2024 1413)  Outcome Evaluation: Denies SI.  Loos stools still.  Plan of Care Reviewed With: patient  Overall Patient Progress: no change  Goal: Patient-Specific Goal (Individualized)  Description: You can add care plan individualizations to a care plan. Examples of Individualization might be:  \"Parent requests to be called daily at 9am for status\", \"I have a hard time hearing out of my right ear\", or \"Do not touch me to wake me up as it startles  me\".  Outcome: Progressing  Goal: Absence of Hospital-Acquired Illness or Injury  Outcome: Progressing  Intervention: Identify and Manage Fall Risk  Recent Flowsheet Documentation  Taken 10/19/2024 0840 by Elina Rao RN  Safety Promotion/Fall Prevention:   activity supervised   bedside attendant   clutter free environment maintained   safety round/check completed   assistive device/personal items within reach   increase visualization of patient   lighting adjusted   mobility aid in reach   nonskid shoes/slippers when out of bed   patient and family education   room organization consistent   supervised activity  Intervention: " Prevent Skin Injury  Recent Flowsheet Documentation  Taken 10/19/2024 1230 by Elina Rao RN  Body Position:   position changed independently   sitting up in bed  Taken 10/19/2024 1136 by Elina Rao RN  Body Position:   position changed independently   sitting up in bed  Taken 10/19/2024 1015 by Elina Rao RN  Body Position: position changed independently  Taken 10/19/2024 0842 by Elina Rao RN  Body Position: position changed independently  Taken 10/19/2024 0840 by Elina Rao RN  Skin Protection:   adhesive use limited   incontinence pads utilized   pulse oximeter probe site changed  Device Skin Pressure Protection:   adhesive use limited   absorbent pad utilized/changed   pressure points protected   positioning supports utilized   tubing/devices free from skin contact  Intervention: Prevent and Manage VTE (Venous Thromboembolism) Risk  Recent Flowsheet Documentation  Taken 10/19/2024 0840 by Elina Rao RN  VTE Prevention/Management:   patient refused intervention   SCDs off (sequential compression devices)  Intervention: Prevent Infection  Recent Flowsheet Documentation  Taken 10/19/2024 0840 by Elina Rao RN  Infection Prevention:   personal protective equipment utilized   hand hygiene promoted   equipment surfaces disinfected   environmental surveillance performed   rest/sleep promoted   single patient room provided  Goal: Optimal Comfort and Wellbeing  Outcome: Progressing  Intervention: Monitor Pain and Promote Comfort  Recent Flowsheet Documentation  Taken 10/19/2024 1230 by Elina Rao RN  Pain Management Interventions: rest  Taken 10/19/2024 1136 by Elina Rao RN  Pain Management Interventions: medication (see MAR)  Taken 10/19/2024 1120 by Elina Rao RN  Pain Management Interventions: rest  Taken 10/19/2024 1020 by Elina Rao RN  Pain Management Interventions: medication (see MAR)  Taken 10/19/2024 1015 by Elina Rao RN  Pain Management Interventions:  rest  Taken 10/19/2024 0842 by Elina Rao RN  Pain Management Interventions:   pain management plan reviewed with patient/caregiver   medication offered but refused   care clustered   emotional support   distraction   rest   relaxation techniques promoted   pillow support provided  Goal: Readiness for Transition of Care  Outcome: Progressing     Problem: Seclusion/Restraint, Behavioral  Goal: Seclusion/Behavioral Restraint Goal: Absence of Harm or Injury  Outcome: Progressing  Intervention: Protect Skin and Joint Integrity  Recent Flowsheet Documentation  Taken 10/19/2024 1230 by Elina Rao RN  Body Position:   position changed independently   sitting up in bed  Taken 10/19/2024 1136 by Elina Rao RN  Body Position:   position changed independently   sitting up in bed  Taken 10/19/2024 1015 by Elina Rao RN  Body Position: position changed independently  Taken 10/19/2024 0842 by Elina Rao RN  Body Position: position changed independently  Taken 10/19/2024 0840 by Elina Rao RN  Skin Protection:   adhesive use limited   incontinence pads utilized   pulse oximeter probe site changed     Problem: Suicide Risk  Goal: Absence of Self-Harm  Outcome: Progressing  Intervention: Assess Risk to Self and Maintain Safety  Recent Flowsheet Documentation  Taken 10/19/2024 0840 by Elina Rao RN  Self-Harm Prevention:   observed one-to-one   environment modified for self-harm risk   environmental self-harm risks assessed  Enhanced Safety Measures:    at bedside   review medications for side effects with activity   patient/family teach back on injury risk   pain management   assistive devices when indicated  Intervention: Promote Psychosocial Wellbeing  Recent Flowsheet Documentation  Taken 10/19/2024 0840 by Elina Rao RN  Supportive Measures:   active listening utilized   decision-making supported   goal-setting facilitated   positive reinforcement provided   problem-solving  facilitated   relaxation techniques promoted   self-care encouraged   self-reflection promoted   self-responsibility promoted   verbalization of feelings encouraged     Problem: Pain Acute  Goal: Optimal Pain Control and Function  Outcome: Progressing  Intervention: Develop Pain Management Plan  Recent Flowsheet Documentation  Taken 10/19/2024 1230 by Elina Rao RN  Pain Management Interventions: rest  Taken 10/19/2024 1136 by Elina Rao RN  Pain Management Interventions: medication (see MAR)  Taken 10/19/2024 1120 by Elina Rao RN  Pain Management Interventions: rest  Taken 10/19/2024 1020 by Elina Rao RN  Pain Management Interventions: medication (see MAR)  Taken 10/19/2024 1015 by Elina Rao RN  Pain Management Interventions: rest  Taken 10/19/2024 0842 by Elina Rao RN  Pain Management Interventions:   pain management plan reviewed with patient/caregiver   medication offered but refused   care clustered   emotional support   distraction   rest   relaxation techniques promoted   pillow support provided  Intervention: Prevent or Manage Pain  Recent Flowsheet Documentation  Taken 10/19/2024 0840 by Elina Rao RN  Medication Review/Management: medications reviewed  Intervention: Optimize Psychosocial Wellbeing  Recent Flowsheet Documentation  Taken 10/19/2024 0840 by Elina Rao RN  Supportive Measures:   active listening utilized   decision-making supported   goal-setting facilitated   positive reinforcement provided   problem-solving facilitated   relaxation techniques promoted   self-care encouraged   self-reflection promoted   self-responsibility promoted   verbalization of feelings encouraged     Problem: Diarrhea  Goal: Effective Diarrhea Management  Outcome: Progressing  Intervention: Manage Diarrhea  Recent Flowsheet Documentation  Taken 10/19/2024 0840 by Elina Rao RN  Isolation Precautions: enteric precautions maintained  Medication Review/Management: medications  reviewed

## 2024-10-19 NOTE — PLAN OF CARE
"Goal Outcome Evaluation:      Plan of Care Reviewed With: patient    Overall Patient Progress: no changeOverall Patient Progress: no change    Outcome Evaluation: Pt AOx4. Denies SI; PSC maintained. Independent. Pain managed with atarax, oxycodone, toradol and tylenol. DVT - lovenox. Abx - PO vancomycin. Discharge TBD      Problem: Adult Inpatient Plan of Care  Goal: Plan of Care Review  Description: The Plan of Care Review/Shift note should be completed every shift.  The Outcome Evaluation is a brief statement about your assessment that the patient is improving, declining, or no change.  This information will be displayed automatically on your shift  note.  Outcome: Progressing  Flowsheets (Taken 10/19/2024 0644)  Outcome Evaluation:   Pt AOx4. Denies SI   PSC maintained. Independent. Pain managed with atarax, oxycodone, toradol and tylenol. DVT - lovenox. Abx - PO vancomycin. Discharge TBD  Plan of Care Reviewed With: patient  Overall Patient Progress: no change  Goal: Patient-Specific Goal (Individualized)  Description: You can add care plan individualizations to a care plan. Examples of Individualization might be:  \"Parent requests to be called daily at 9am for status\", \"I have a hard time hearing out of my right ear\", or \"Do not touch me to wake me up as it startles  me\".  Outcome: Progressing  Goal: Absence of Hospital-Acquired Illness or Injury  Outcome: Progressing  Intervention: Identify and Manage Fall Risk  Recent Flowsheet Documentation  Taken 10/18/2024 2126 by Brisa Atwood RN  Safety Promotion/Fall Prevention:   activity supervised   bedside attendant   clutter free environment maintained   room near nurse's station   safety round/check completed  Intervention: Prevent Skin Injury  Recent Flowsheet Documentation  Taken 10/18/2024 2126 by Brisa Atwood, RN  Body Position: position changed independently  Skin Protection: adhesive use limited  Device Skin Pressure Protection:   " positioning supports utilized   adhesive use limited  Intervention: Prevent and Manage VTE (Venous Thromboembolism) Risk  Recent Flowsheet Documentation  Taken 10/18/2024 2126 by Brisa Atwood RN  VTE Prevention/Management: patient refused intervention  Intervention: Prevent Infection  Recent Flowsheet Documentation  Taken 10/18/2024 2126 by Brisa Atwood RN  Infection Prevention:   single patient room provided   rest/sleep promoted   personal protective equipment utilized   hand hygiene promoted  Goal: Optimal Comfort and Wellbeing  Outcome: Progressing  Intervention: Monitor Pain and Promote Comfort  Recent Flowsheet Documentation  Taken 10/18/2024 2117 by Brisa Atwood RN  Pain Management Interventions: medication (see MAR)  Goal: Readiness for Transition of Care  Outcome: Progressing     Problem: Seclusion/Restraint, Behavioral  Goal: Seclusion/Behavioral Restraint Goal: Absence of Harm or Injury  Outcome: Progressing  Intervention: Protect Skin and Joint Integrity  Recent Flowsheet Documentation  Taken 10/18/2024 2126 by Brisa Atwood RN  Body Position: position changed independently  Skin Protection: adhesive use limited  Range of Motion: active ROM (range of motion) encouraged     Problem: Suicide Risk  Goal: Absence of Self-Harm  Outcome: Progressing  Intervention: Assess Risk to Self and Maintain Safety  Recent Flowsheet Documentation  Taken 10/18/2024 2126 by Brisa Atwood RN  Behavior Management:   behavioral plan developed   behavioral plan reviewed   boundaries reinforced   security enhancements provided  Enhanced Safety Measures:   pain management   room near unit station    at bedside

## 2024-10-19 NOTE — PROGRESS NOTES
[facility]  Infectious Disease Progress Note          Assessment and Plan:   Date of Admission:  10/12/2024  Date of Consult (When I saw the patient): 10/17/24        Assessment & Plan  Prakash Prasad is a 34 year old adult who was admitted on 10/12/2024.      Impression: 1 34-year-old male, housed in ER for several days awaiting psychiatric disposition with multiple underlying psychiatric issues  2 mildly abnormal urinalysis given Keflex, urine culture eventually unremarkable and not obvious true UTI  3 acute diarrhea, multiple loose stools continuing, crampy abdominal pain, C. difficile PCR positive but toxin negative implying colonization, also of interest #4 below, however hard to exclude that this is true C. difficile colitis from the current antibiotic course  4 prior C. difficile colitis in 2018 until 2020, even then a typical pattern a typical response to treatment, consideration was colonization rather than true infection and then eventually resolved and does not have ongoing chronic diarrhea     REC 1 hard to exclude this is not early true C. Difficile colitis, given ongoing diarrhea , and major disposition from this is going to create for now I think we treat, will do Vanco, get an enteric panel as well given is not clear the diagnosis actually C. difficile.  If he has continued crampy abdominal pain and nonresolving diarrhea question CT scan and look for other causes here really not improved, C. difficile of course is slowly improved/not that surprising but I would nonetheless pursue alternative CT scan a reasonable start           Interval History:     no new complaints , still loose stools but fewer than previously, crampy abdominal pain is not better no major fever or systemic symptoms no new positive studies              Medications:     Current Facility-Administered Medications   Medication Dose Route Frequency Provider Last Rate Last Admin    amLODIPine (NORVASC) tablet 10  "mg  10 mg Oral Daily Osbaldo Davis MD   10 mg at 10/19/24 0843    ARIPiprazole (ABILIFY) tablet 10 mg  10 mg Oral IRINEO Kwame Encarnacion MD   10 mg at 10/19/24 0843    empagliflozin (JARDIANCE) tablet 10 mg  10 mg Oral Daily Kwame Encarnacion MD   10 mg at 10/19/24 0843    enoxaparin ANTICOAGULANT (LOVENOX) injection 40 mg  40 mg Subcutaneous Q24H Osbaldo Davis MD   40 mg at 10/18/24 2117    gabapentin (NEURONTIN) tablet 1,200 mg  1,200 mg Oral BID Osbaldo Davis MD   1,200 mg at 10/19/24 0842    insulin aspart (NovoLOG) injection (RAPID ACTING)  1-7 Units Subcutaneous TID AC Osbaldo Davis MD   1 Units at 10/19/24 1234    insulin aspart (NovoLOG) injection (RAPID ACTING)  1-5 Units Subcutaneous At Bedtime Osbaldo Davis MD        nicotine (NICODERM CQ) 14 MG/24HR 24 hr patch 1 patch  1 patch Transdermal Daily EspinozaHardik, DO   1 patch at 10/19/24 0841    paliperidone (INVEGA) 24 hr tablet 1.5 mg  1.5 mg Oral Kwame Camarillo MD   1.5 mg at 10/19/24 1015    paliperidone ER (INVEGA) 24 hr tablet 3 mg  3 mg Oral Kwame Camarillo MD   3 mg at 10/19/24 1015    PARoxetine (PAXIL) tablet 20 mg  20 mg Oral Daily Kwame Encarnacion MD   20 mg at 10/19/24 0843    QUEtiapine (SEROquel) tablet 200 mg  200 mg Oral At Bedtime Tank Durbin MD   200 mg at 10/18/24 2117    rosuvastatin (CRESTOR) tablet 40 mg  40 mg Oral Daily Kwame Encarnacion MD   40 mg at 10/19/24 0843    testosterone (ANDROGEL/TESTIM) 50 MG/5GM (1%) topical gel 100 mg of testosterone  100 mg of testosterone Transdermal Daily Kwame Encarnacion MD   100 mg of testosterone at 10/19/24 0843    vancomycin (VANCOCIN) capsule 125 mg  125 mg Oral 4x Daily Lamine Vieyra MD   125 mg at 10/19/24 1136                  Physical Exam:   Blood pressure 116/79, pulse 96, temperature 98.4  F (36.9  C), temperature source Temporal, resp. rate 18, height 1.676 m (5' 6\"), weight 99.8 kg (220 lb), SpO2 94%, not currently " breastfeeding.  Wt Readings from Last 2 Encounters:   10/16/24 99.8 kg (220 lb)   10/02/24 105.9 kg (233 lb 6.4 oz)     Vital Signs with Ranges  Temp:  [97.4  F (36.3  C)-98.4  F (36.9  C)] 98.4  F (36.9  C)  Pulse:  [66-96] 96  Resp:  [16-18] 18  BP: (116-136)/(75-85) 116/79  SpO2:  [94 %-95 %] 94 %    Constitutional: Awake, alert, cooperative, no apparent distress     Lungs: Clear to auscultation bilaterally, no crackles or wheezing   Cardiovascular: Regular rate and rhythm, normal S1 and S2, and no murmur noted   Abdomen: Normal bowel sounds, soft, non-distended, non-tender   Skin: No rashes, no cyanosis, no edema   Other:           Data:   All microbiology laboratory data reviewed.  Recent Labs   Lab Test 10/19/24  0625 10/17/24  0549 10/12/24  2056 09/26/24  0814   WBC  --  7.5 14.6* 9.7   HGB  --  15.0 15.9 14.1   HCT  --  46.0 47.6 42.6   MCV  --  86 84 87    244 295 239     Recent Labs   Lab Test 10/19/24  0625 10/17/24  0549 10/12/24  2055   CR 0.71 0.66 0.76     No lab results found.  Recent Labs   Lab Test 01/18/19  1538 12/07/18  1203 11/29/18  1644 10/13/18  1705 01/27/17  0935   CULT No beta hemolytic Streptococcus Group A isolated No beta hemolytic Streptococcus Group A isolated No beta hemolytic Streptococcus Group A isolated 50,000 to 100,000 colonies/mL  mixed urogenital elgin  Susceptibility testing not routinely done   >100,000 colonies/mL Coagulase negative Staphylococcus  10,000 to 50,000 colonies/mL Pseudomonas aeruginosa  *

## 2024-10-19 NOTE — PROGRESS NOTES
Lab cancelled enteric panel because per  enteric panels aren't done for patients who have been hospitalized for more than three days.

## 2024-10-20 LAB
GLUCOSE BLDC GLUCOMTR-MCNC: 105 MG/DL (ref 70–99)
GLUCOSE BLDC GLUCOMTR-MCNC: 115 MG/DL (ref 70–99)
GLUCOSE BLDC GLUCOMTR-MCNC: 124 MG/DL (ref 70–99)
GLUCOSE BLDC GLUCOMTR-MCNC: 171 MG/DL (ref 70–99)

## 2024-10-20 PROCEDURE — 99232 SBSQ HOSP IP/OBS MODERATE 35: CPT | Performed by: STUDENT IN AN ORGANIZED HEALTH CARE EDUCATION/TRAINING PROGRAM

## 2024-10-20 PROCEDURE — 250N000013 HC RX MED GY IP 250 OP 250 PS 637: Performed by: EMERGENCY MEDICINE

## 2024-10-20 PROCEDURE — 250N000013 HC RX MED GY IP 250 OP 250 PS 637: Performed by: INTERNAL MEDICINE

## 2024-10-20 PROCEDURE — 250N000013 HC RX MED GY IP 250 OP 250 PS 637: Performed by: STUDENT IN AN ORGANIZED HEALTH CARE EDUCATION/TRAINING PROGRAM

## 2024-10-20 PROCEDURE — 120N000001 HC R&B MED SURG/OB

## 2024-10-20 PROCEDURE — A9270 NON-COVERED ITEM OR SERVICE: HCPCS | Performed by: EMERGENCY MEDICINE

## 2024-10-20 PROCEDURE — 250N000013 HC RX MED GY IP 250 OP 250 PS 637: Performed by: HOSPITALIST

## 2024-10-20 PROCEDURE — 250N000011 HC RX IP 250 OP 636: Performed by: INTERNAL MEDICINE

## 2024-10-20 PROCEDURE — 99232 SBSQ HOSP IP/OBS MODERATE 35: CPT | Performed by: INTERNAL MEDICINE

## 2024-10-20 RX ORDER — HYDROCORTISONE 25 MG/G
CREAM TOPICAL 2 TIMES DAILY
Status: DISCONTINUED | OUTPATIENT
Start: 2024-10-20 | End: 2024-10-22 | Stop reason: HOSPADM

## 2024-10-20 RX ORDER — POLYETHYLENE GLYCOL 3350 17 G/17G
17 POWDER, FOR SOLUTION ORAL DAILY
Status: DISCONTINUED | OUTPATIENT
Start: 2024-10-20 | End: 2024-10-22 | Stop reason: HOSPADM

## 2024-10-20 RX ADMIN — POLYETHYLENE GLYCOL 3350 17 G: 17 POWDER, FOR SOLUTION ORAL at 12:29

## 2024-10-20 RX ADMIN — CYCLOBENZAPRINE 10 MG: 10 TABLET, FILM COATED ORAL at 13:20

## 2024-10-20 RX ADMIN — NICOTINE POLACRILEX 4 MG: 2 GUM, CHEWING ORAL at 11:08

## 2024-10-20 RX ADMIN — HYDROXYZINE HYDROCHLORIDE 25 MG: 25 TABLET ORAL at 18:33

## 2024-10-20 RX ADMIN — ACETAMINOPHEN 650 MG: 325 TABLET, FILM COATED ORAL at 18:32

## 2024-10-20 RX ADMIN — PALIPERIDONE 1.5 MG: 1.5 TABLET, EXTENDED RELEASE ORAL at 07:59

## 2024-10-20 RX ADMIN — VANCOMYCIN HYDROCHLORIDE 125 MG: 125 CAPSULE ORAL at 17:12

## 2024-10-20 RX ADMIN — NICOTINE POLACRILEX 4 MG: 2 GUM, CHEWING ORAL at 00:13

## 2024-10-20 RX ADMIN — NICOTINE POLACRILEX 4 MG: 2 GUM, CHEWING ORAL at 14:15

## 2024-10-20 RX ADMIN — PAROXETINE HYDROCHLORIDE 20 MG: 20 TABLET, FILM COATED ORAL at 07:59

## 2024-10-20 RX ADMIN — ENOXAPARIN SODIUM 40 MG: 40 INJECTION SUBCUTANEOUS at 21:21

## 2024-10-20 RX ADMIN — DIPHENHYDRAMINE HYDROCHLORIDE 25 MG: 25 CAPSULE ORAL at 00:35

## 2024-10-20 RX ADMIN — DIPHENHYDRAMINE HYDROCHLORIDE 25 MG: 25 CAPSULE ORAL at 07:59

## 2024-10-20 RX ADMIN — ACETAMINOPHEN 650 MG: 325 TABLET, FILM COATED ORAL at 11:07

## 2024-10-20 RX ADMIN — ARIPIPRAZOLE 10 MG: 10 TABLET ORAL at 08:00

## 2024-10-20 RX ADMIN — VANCOMYCIN HYDROCHLORIDE 125 MG: 125 CAPSULE ORAL at 07:59

## 2024-10-20 RX ADMIN — QUETIAPINE 200 MG: 200 TABLET, FILM COATED ORAL at 21:21

## 2024-10-20 RX ADMIN — AMLODIPINE BESYLATE 10 MG: 10 TABLET ORAL at 08:00

## 2024-10-20 RX ADMIN — VANCOMYCIN HYDROCHLORIDE 125 MG: 125 CAPSULE ORAL at 21:21

## 2024-10-20 RX ADMIN — NICOTINE POLACRILEX 4 MG: 2 GUM, CHEWING ORAL at 18:33

## 2024-10-20 RX ADMIN — NICOTINE 1 PATCH: 14 PATCH, EXTENDED RELEASE TRANSDERMAL at 07:58

## 2024-10-20 RX ADMIN — EMPAGLIFLOZIN 10 MG: 10 TABLET, FILM COATED ORAL at 07:59

## 2024-10-20 RX ADMIN — OXYCODONE HYDROCHLORIDE 10 MG: 5 TABLET ORAL at 06:55

## 2024-10-20 RX ADMIN — GABAPENTIN 1200 MG: 600 TABLET, FILM COATED ORAL at 21:21

## 2024-10-20 RX ADMIN — NICOTINE POLACRILEX 4 MG: 2 GUM, CHEWING ORAL at 07:59

## 2024-10-20 RX ADMIN — ACETAMINOPHEN 650 MG: 325 TABLET, FILM COATED ORAL at 00:35

## 2024-10-20 RX ADMIN — GABAPENTIN 1200 MG: 600 TABLET, FILM COATED ORAL at 07:59

## 2024-10-20 RX ADMIN — NICOTINE POLACRILEX 4 MG: 2 GUM, CHEWING ORAL at 06:55

## 2024-10-20 RX ADMIN — PALIPERIDONE 3 MG: 3 TABLET, EXTENDED RELEASE ORAL at 08:00

## 2024-10-20 RX ADMIN — MICONAZOLE NITRATE: 20 POWDER TOPICAL at 14:15

## 2024-10-20 RX ADMIN — HYDROCORTISONE: 25 CREAM TOPICAL at 21:26

## 2024-10-20 RX ADMIN — TESTOSTERONE 100 MG OF TESTOSTERONE: 50 GEL TRANSDERMAL at 07:59

## 2024-10-20 RX ADMIN — OXYCODONE HYDROCHLORIDE 5 MG: 5 TABLET ORAL at 13:19

## 2024-10-20 RX ADMIN — HYDROXYZINE HYDROCHLORIDE 25 MG: 25 TABLET ORAL at 06:55

## 2024-10-20 RX ADMIN — VANCOMYCIN HYDROCHLORIDE 125 MG: 125 CAPSULE ORAL at 11:07

## 2024-10-20 RX ADMIN — NICOTINE POLACRILEX 4 MG: 2 GUM, CHEWING ORAL at 17:12

## 2024-10-20 RX ADMIN — NICOTINE POLACRILEX 4 MG: 2 GUM, CHEWING ORAL at 13:20

## 2024-10-20 RX ADMIN — HYDROCORTISONE: 25 CREAM TOPICAL at 12:32

## 2024-10-20 RX ADMIN — OXYCODONE HYDROCHLORIDE 5 MG: 5 TABLET ORAL at 00:35

## 2024-10-20 RX ADMIN — CLONAZEPAM 0.5 MG: 0.5 TABLET ORAL at 17:11

## 2024-10-20 RX ADMIN — CLONAZEPAM 0.5 MG: 0.5 TABLET ORAL at 14:15

## 2024-10-20 RX ADMIN — ROSUVASTATIN CALCIUM 40 MG: 20 TABLET, FILM COATED ORAL at 08:00

## 2024-10-20 ASSESSMENT — ACTIVITIES OF DAILY LIVING (ADL)
ADLS_ACUITY_SCORE: 24
ADLS_ACUITY_SCORE: 25
ADLS_ACUITY_SCORE: 24
ADLS_ACUITY_SCORE: 25
ADLS_ACUITY_SCORE: 24
ADLS_ACUITY_SCORE: 25
ADLS_ACUITY_SCORE: 25
ADLS_ACUITY_SCORE: 24
ADLS_ACUITY_SCORE: 25
ADLS_ACUITY_SCORE: 25
ADLS_ACUITY_SCORE: 24

## 2024-10-20 NOTE — PLAN OF CARE
"Goal Outcome Evaluation:      Plan of Care Reviewed With: patient    Overall Patient Progress: improvingOverall Patient Progress: improving    Outcome Evaluation: PSC maintained. Pain managed with oxycodone, atarax, tylenol. Red itchy rash on underarms and under the breasts; pt believes they are an allergic reaction from a new deodorant he just started using; baby powder applied; benadryl ordered and given. Discharge TBD.      Problem: Adult Inpatient Plan of Care  Goal: Plan of Care Review  Description: The Plan of Care Review/Shift note should be completed every shift.  The Outcome Evaluation is a brief statement about your assessment that the patient is improving, declining, or no change.  This information will be displayed automatically on your shift  note.  Outcome: Progressing  Flowsheets (Taken 10/20/2024 0608)  Outcome Evaluation:   PSC maintained. Pain managed with oxycodone, atarax, tylenol. Red itchy rash on underarms and under the breasts   pt believes they are an allergic reaction from a new deodorant he just started using   baby powder applied   benadryl ordered and given. Discharge TBD.  Plan of Care Reviewed With: patient  Overall Patient Progress: improving  Goal: Patient-Specific Goal (Individualized)  Description: You can add care plan individualizations to a care plan. Examples of Individualization might be:  \"Parent requests to be called daily at 9am for status\", \"I have a hard time hearing out of my right ear\", or \"Do not touch me to wake me up as it startles  me\".  Outcome: Progressing  Goal: Absence of Hospital-Acquired Illness or Injury  Outcome: Progressing  Intervention: Identify and Manage Fall Risk  Recent Flowsheet Documentation  Taken 10/19/2024 2117 by Brisa Atwood RN  Safety Promotion/Fall Prevention:   bedside attendant   clutter free environment maintained   room near nurse's station   safety round/check completed   nonskid shoes/slippers when out of bed  Intervention: " Prevent Skin Injury  Recent Flowsheet Documentation  Taken 10/19/2024 2117 by Brisa Atwood RN  Body Position: position changed independently  Skin Protection: adhesive use limited  Device Skin Pressure Protection:   adhesive use limited   tubing/devices free from skin contact  Intervention: Prevent and Manage VTE (Venous Thromboembolism) Risk  Recent Flowsheet Documentation  Taken 10/19/2024 2117 by Brisa Atwood RN  VTE Prevention/Management: SCDs off (sequential compression devices)  Intervention: Prevent Infection  Recent Flowsheet Documentation  Taken 10/19/2024 2117 by Brisa Atwood RN  Infection Prevention:   single patient room provided   rest/sleep promoted   personal protective equipment utilized   hand hygiene promoted   equipment surfaces disinfected  Goal: Optimal Comfort and Wellbeing  Outcome: Progressing  Goal: Readiness for Transition of Care  Outcome: Progressing     Problem: Seclusion/Restraint, Behavioral  Goal: Seclusion/Behavioral Restraint Goal: Absence of Harm or Injury  Outcome: Progressing  Intervention: Protect Skin and Joint Integrity  Recent Flowsheet Documentation  Taken 10/19/2024 2117 by Brisa Atwood RN  Body Position: position changed independently  Skin Protection: adhesive use limited  Range of Motion: active ROM (range of motion) encouraged     Problem: Suicide Risk  Goal: Absence of Self-Harm  Outcome: Progressing  Intervention: Assess Risk to Self and Maintain Safety  Recent Flowsheet Documentation  Taken 10/19/2024 2117 by Brisa Atwood RN  Enhanced Safety Measures:   pain management   room near unit station    at bedside  Intervention: Promote Psychosocial Wellbeing  Recent Flowsheet Documentation  Taken 10/19/2024 2117 by Brisa Atwood RN  Supportive Measures:   active listening utilized   positive reinforcement provided   problem-solving facilitated   relaxation techniques promoted   self-care  encouraged   verbalization of feelings encouraged     Problem: Pain Acute  Goal: Optimal Pain Control and Function  Outcome: Progressing  Intervention: Prevent or Manage Pain  Recent Flowsheet Documentation  Taken 10/19/2024 2117 by Brisa Atwood RN  Medication Review/Management: medications reviewed  Intervention: Optimize Psychosocial Wellbeing  Recent Flowsheet Documentation  Taken 10/19/2024 2117 by Brisa Atwood RN  Supportive Measures:   active listening utilized   positive reinforcement provided   problem-solving facilitated   relaxation techniques promoted   self-care encouraged   verbalization of feelings encouraged     Problem: Diarrhea  Goal: Effective Diarrhea Management  Outcome: Progressing  Intervention: Manage Diarrhea  Recent Flowsheet Documentation  Taken 10/19/2024 2117 by Brisa Atwood, RN  Isolation Precautions: enteric precautions maintained  Medication Review/Management: medications reviewed

## 2024-10-20 NOTE — PROGRESS NOTES
[facility]    Medicine Progress Note - Hospitalist Service    Date of Admission:  10/12/2024    Assessment & Plan     Prakash Prasad is a 34 year old adult with ADHD, bipolar 1 disorder, schizoaffective disorder,  borderline personality disorder, depression, type 2 diabetes mellitus, GERD, history of TBI, hypertension, obesity, substance abuse in the past with methamphetamine, Heroin but now in remission except takes medicinal marijuana, PTSD who was admitted to [facility] on 10/12/2024 after drug overdose with 20 pills of 400 mg gabapentin and 10 Klonopin few hours before arriving in a suicidal attempt.  Patient was awaiting transfer to a close psych unit when developed diarrhea with nausea and C. difficile antigen was positive with C. difficile toxin negative.  ID was consulted and given the patient's symptomatology oral vancomycin was started on 10/17/2024.     Psychiatry consulted, appreciated recommendation patient currently is on court ordered hold.  Case management consulted and following to assist with discharge planning.       Acute diarrhea  C. difficile testing more consistent with colonization  Patient was on antibiotics recently about a month ago.  Patient started having diarrhea about 4-8 small bowel movements every day, not formed stool.  Associated with cramping abdominal pain though no nausea or vomiting.  C. difficile testing was done which revealed positive antigen and negative toxin, more consistent with colonization rather than acute infection.  However given ongoing diarrhea and recent antibiotic use patient was started on oral vancomycin as per ID consultation.  Patient continues to have diarrhea and cramping abdominal pain.  Obtained repeat CT abdomen and pelvis which revealed formed stool throughout the colon.  Patient reported that her diarrhea has improved and now he is having more formed stools.    -Continue p.o. vancomycin 125 mg 4 times daily.   Infectious disease recommended total 2-week therapy.  - Given his stool burden in the colon will start patient on MiraLAX 17 g daily.  Hold MiraLAX if more than 3 bowel movements per day.     Intertrigo versus Candida infection in bilateral axilla and under the breast  Patient was noted to have erythematous rash which is pruritic in his bilateral axilla as well as underneath the breast.  In the right axilla there were a few satellite lesions as well.  Clinically difficult to differentiate whether its intertrigo or Candida infection.  However often they coexist so we will treat both.  - Start topical hydrocortisone 2.5% twice daily for 1 week  - Start miconazole powder twice daily for 7 to 14 days  - Counseled patient about keeping the underarms and the skin folds underneath the breast dry and clean as much as possible    Type 2 diabetes mellitus  -Sliding scale insulin at home empagliflozin 10 mg daily     Suicidal ideation  Schizoaffective disorder  Borderline personality disorder  Depression  PTSD  TBI  On court hold  -Psychiatry consulted, appreciated recommendations  - Continue Abilify 10 mg, paliperidone 1.5 mg every morning, paliperidone 3 mg in the evening, paroxetine 20 mg daily, quetiapine 200 mg at bedtime.   -Patient needs to be diarrhea free at least for 48 hours before transition to inpatient psych  -Patient is medically improving and can be ready by tomorrow or so.  However it is on court hold.  Case management is assisting with discharge planning.    Transgender male  Continue testosterone 100 mg transdermal gel daily     GERD  Will not treat GERD at this time with no symptoms     Hypertension  Amlodipine dose was increased to 10 mg daily this hospitalization     Fracture base of right fifth metacarpal  Pain management oxycodone that the patient was taking before was restarted as he has throbbing pain in the right hand.  Also complained about numbing of right fifth digit.  Consulted orthopedics,  "appreciated recommendations.          Diet: Regular Diet Adult    DVT Prophylaxis: Enoxaparin (Lovenox) SQ  Styles Catheter: Not present  Lines: None     Cardiac Monitoring: None  Code Status: Full Code      Clinically Significant Risk Factors                   # Hypertension: Noted on problem list          # DMII: A1C = 7.4 % (Ref range: 0.0 - 5.6 %) within past 6 months, PRESENT ON ADMISSION  # Obesity: Estimated body mass index is 35.51 kg/m  as calculated from the following:    Height as of this encounter: 1.676 m (5' 6\").    Weight as of this encounter: 99.8 kg (220 lb)., PRESENT ON ADMISSION       # Financial/Environmental Concerns: none         Disposition Plan     Medically Ready for Discharge: Anticipated Tomorrow             Chandni Davis MD  Hospitalist Service  [facility]  Securely message with [employer] (more info)  Text page via [employer] Paging/Directory   ______________________________________________________________________    Interval History   Patient now started to have more formed stools.  Abdominal cramps are there but overall improving.  Reported ongoing pain in his right hand and now the right fifth finger feels numb.  Also complained about pruritus involving both of his axilla as lies underneath the breast.     4 point review of systems otherwise negative    Physical Exam   Vital Signs: Temp: 98  F (36.7  C) Temp src: Temporal BP: 138/78 Pulse: 89   Resp: 16 SpO2: 97 % O2 Device: None (Room air)    Weight: 220 lbs 0 oz    Constitutional: awake, alert, cooperative, no apparent distress, and appears stated age  Eyes: Anicteric sclera  Respiratory: On room air, equal air entry bilaterally, no wheezing or crackles  Cardiovascular: Normal rate, regular rhythm, no murmur  GI: Soft, nontender, nondistended  Musculoskeletal: no lower extremity pitting edema present  Neurologic: Awake, alert, oriented to name, place and time.  No Focal deficit  Neuropsychiatric: Flat affect  Skin: " Eczematous rash involving bilateral axilla as well as skin fold underneath the breast, few satellite lesions noted in the right axilla, no excoriation, pustules, or pus noted.    Medical Decision Making       42 MINUTES SPENT BY ME on the date of service doing chart review, history, exam, documentation & further activities per the note.      Data   ------------------------- PAST 24 HR DATA REVIEWED -----------------------------------------------

## 2024-10-20 NOTE — PROGRESS NOTES
[facility]  Infectious Disease Progress Note          Assessment and Plan:   Date of Admission:  10/12/2024  Date of Consult (When I saw the patient): 10/17/24        Assessment & Plan  Prakash Prasad is a 34 year old adult who was admitted on 10/12/2024.      Impression: 1 34-year-old male, housed in ER for several days awaiting psychiatric disposition with multiple underlying psychiatric issues  2 mildly abnormal urinalysis given Keflex, urine culture eventually unremarkable and not obvious true UTI  3 acute diarrhea, multiple loose stools continuing, crampy abdominal pain, C. difficile PCR positive but toxin negative implying colonization, also of interest #4 below, however hard to exclude that this is true C. difficile colitis from the current antibiotic course  4 prior C. difficile colitis in 2018 until 2020, even then a typical pattern a typical response to treatment, consideration was colonization rather than true infection and then eventually resolved and does not have ongoing chronic diarrhea     REC 1 hard to exclude this is not early true C. Difficile colitis, given ongoing diarrhea , and major disposition from this is going to create for now I think we treat, will do Vanco, get an enteric panel as well given is not clear the diagnosis actually C. difficile.  Appears to be responding, actually having formed stool still having a lot of crampy abdominal pain but CT scan unremarkable, I would favor simply completing a 2-week vancomycin oral course and holding off on bigger workup for now           Interval History:     no new complaints , formed stools, crampy abdominal pain better no major fever or systemic symptoms no new positive studies CT scan without any obvious issue including no major colitis              Medications:     Current Facility-Administered Medications   Medication Dose Route Frequency Provider Last Rate Last Admin    amLODIPine (NORVASC) tablet 10 mg  10 mg Oral  Daily Osbaldo Davis MD   10 mg at 10/20/24 0800    ARIPiprazole (ABILIFY) tablet 10 mg  10 mg Oral Kwame Camarillo MD   10 mg at 10/20/24 0800    empagliflozin (JARDIANCE) tablet 10 mg  10 mg Oral Daily Kwame Encarnacion MD   10 mg at 10/20/24 0759    enoxaparin ANTICOAGULANT (LOVENOX) injection 40 mg  40 mg Subcutaneous Q24H Osbaldo Davis MD   40 mg at 10/19/24 2117    gabapentin (NEURONTIN) tablet 1,200 mg  1,200 mg Oral BID Osbaldo Davis MD   1,200 mg at 10/20/24 0759    hydrocortisone 2.5 % cream   Topical BID Chandni Davis MD        insulin aspart (NovoLOG) injection (RAPID ACTING)  1-7 Units Subcutaneous TID AC Osbaldo Davis MD   1 Units at 10/19/24 1234    insulin aspart (NovoLOG) injection (RAPID ACTING)  1-5 Units Subcutaneous At Bedtime Osbaldo Davis MD        miconazole (MICATIN) 2 % powder   Topical BID Chandni Davis MD        nicotine (NICODERM CQ) 14 MG/24HR 24 hr patch 1 patch  1 patch Transdermal Daily Hardik Doran DO   1 patch at 10/20/24 0758    paliperidone (INVEGA) 24 hr tablet 1.5 mg  1.5 mg Oral Kwame Camarillo MD   1.5 mg at 10/20/24 0759    paliperidone ER (INVEGA) 24 hr tablet 3 mg  3 mg Oral Kwame Camarillo MD   3 mg at 10/20/24 0800    PARoxetine (PAXIL) tablet 20 mg  20 mg Oral Daily Kwame Encarnacion MD   20 mg at 10/20/24 0759    polyethylene glycol (MIRALAX) Packet 17 g  17 g Oral Daily Chandni Davis MD        QUEtiapine (SEROquel) tablet 200 mg  200 mg Oral At Bedtime Tank Durbin MD   200 mg at 10/19/24 2117    rosuvastatin (CRESTOR) tablet 40 mg  40 mg Oral Daily Kwame Encarnacion MD   40 mg at 10/20/24 0800    testosterone (ANDROGEL/TESTIM) 50 MG/5GM (1%) topical gel 100 mg of testosterone  100 mg of testosterone Transdermal Daily Kwame Encarnacion MD   100 mg of testosterone at 10/20/24 0759    vancomycin (VANCOCIN) capsule 125 mg  125 mg Oral 4x Daily Lamine Vieyra MD   125 mg at 10/20/24 1101                   "Physical Exam:   Blood pressure (!) 138/90, pulse 94, temperature 97.7  F (36.5  C), temperature source Temporal, resp. rate 16, height 1.676 m (5' 6\"), weight 99.8 kg (220 lb), SpO2 93%, not currently breastfeeding.  Wt Readings from Last 2 Encounters:   10/16/24 99.8 kg (220 lb)   10/02/24 105.9 kg (233 lb 6.4 oz)     Vital Signs with Ranges  Temp:  [97.7  F (36.5  C)-98.4  F (36.9  C)] 97.7  F (36.5  C)  Pulse:  [85-96] 94  Resp:  [16-18] 16  BP: (108-138)/(60-91) 138/90  SpO2:  [92 %-95 %] 93 %    Constitutional: Awake, alert, cooperative, no apparent distress     Lungs: Clear to auscultation bilaterally, no crackles or wheezing   Cardiovascular: Regular rate and rhythm, normal S1 and S2, and no murmur noted   Abdomen: Normal bowel sounds, soft, non-distended, non-tender   Skin: No rashes, no cyanosis, no edema   Other:           Data:   All microbiology laboratory data reviewed.  Recent Labs   Lab Test 10/19/24  0625 10/17/24  0549 10/12/24  2056 09/26/24  0814   WBC  --  7.5 14.6* 9.7   HGB  --  15.0 15.9 14.1   HCT  --  46.0 47.6 42.6   MCV  --  86 84 87    244 295 239     Recent Labs   Lab Test 10/19/24  0625 10/17/24  0549 10/12/24  2055   CR 0.71 0.66 0.76     No lab results found.  Recent Labs   Lab Test 01/18/19  1538 12/07/18  1203 11/29/18  1644 10/13/18  1705 01/27/17  0935   CULT No beta hemolytic Streptococcus Group A isolated No beta hemolytic Streptococcus Group A isolated No beta hemolytic Streptococcus Group A isolated 50,000 to 100,000 colonies/mL  mixed urogenital elgin  Susceptibility testing not routinely done   >100,000 colonies/mL Coagulase negative Staphylococcus  10,000 to 50,000 colonies/mL Pseudomonas aeruginosa  *        "

## 2024-10-20 NOTE — CONSULTS
[facility]    Orthopedics Consultation    Date of Admission:  10/12/2024    Assessment & Plan     PLAN:     Prakash Prasad  is a 34 year old transgender male with a history of ADHD, bipolar, schizoaffective disorder, type 2 diabetes history of TBI, substance abuse who was admitted to [facility] after a drug overdose and is awaiting psychiatric disposition.  He was noted to have base of fifth metacarpal pain and was found to have a minimally displaced left base of fifth metacarpal fracture.  He has been in a splint since arrival to the emergency department on October 12, 2024.  Swelling has been improving.  Radiographs were reviewed and reveal a minimally displaced fracture at the base of the fifth metacarpal.  I recommended transitioning to a Ace wrap for compression and working on gradual range of motion as tolerated.  May utilize the sling to help with elevation.    Recommend follow-up in 2 weeks for repeat x-rays  May remove Ace wrap as needed for hygiene  Encourage range of motion  No lifting more than 5 pound, no weightbearing    Principal Problem:    Schizoaffective disorder, bipolar type (H)  Active Problems:    ADHD (attention deficit hyperactivity disorder)    Borderline personality disorder (H)    Suicidal ideation    Closed nondisplaced fracture of base of fifth metacarpal bone of right hand, initial encounter    Diarrhea, unspecified type    Drug overdose of undetermined intent, initial encounter    ELVER PRIEST MD     Code Status    Full Code    Reason for Consult   Reason for consult: Left hand fracture    Primary Care Physician   Becki Avery    Chief Complaint   Left hand pain    History is obtained from the patient     History of Present Illness   Prakash Prasad  is a 34 year old transgender male with a history of ADHD, bipolar, schizoaffective disorder, type 2 diabetes history of TBI, substance abuse who was admitted to [facility]  after a drug overdose and is awaiting psychiatric disposition.  He was noted to have base of fifth metacarpal pain and was found to have a minimally displaced left base of fifth metacarpal fracture.  He has been in a splint since arrival to the emergency department on October 12, 2024.    He reports his pain has been improving.  He continues to have some swelling.  Notes the splint has been causing some irritation.  And some numbness in the tip of the fifth digit.  Denies any other extremity pain    Past Medical History   I have reviewed this patient's medical history and updated it with pertinent information if needed.   Past Medical History:   Diagnosis Date    ADHD (attention deficit hyperactivity disorder)     Bipolar 1 disorder     Borderline personality disorder     Cauda equina syndrome     Chronic low back pain     Depression     Diabetes mellitus, type 2 (H) 1/19/2023    GERD (gastroesophageal reflux disease)     h/o TBI (traumatic brain injury)     Hypertension, unspecified type 12/16/2021    Marginal corneal ulcer, left 07/17/2015    Nephrolithiasis     obesity     Polysubstance abuse - methamphetamine, hallucinagen, heroin, marijuana     currently in remission    PONV (postoperative nausea and vomiting)     PTSD (post-traumatic stress disorder)        Past Surgical History   I have reviewed this patient's surgical history and updated it with pertinent information if needed.  Past Surgical History:   Procedure Laterality Date    BACK SURGERY  12/24/2016    BACK SURGERY - For Cauda Equina Syndrome  12/24/2016    COLONOSCOPY      COMBINED CYSTOSCOPY, INSERT STENT URETER(S) Left 8/30/2018    Procedure: COMBINED CYSTOSCOPY, INSERT STENT URETER(S);  Cystoscopy With Left Stent Placement;  Surgeon: Kiran Ulrich MD;  Location: [facility] OR    COMBINED CYSTOSCOPY, RETROGRADES, EXCHANGE STENT URETER(S) Left 10/14/2018    Procedure: COMBINED CYSTOSCOPY, RETROGRADES, EXCHANGE STENT URETER(S);  Cystoscopy and  removal of left-sided stent.;  Surgeon: Stiven Olivo MD;  Location:  OR    COMBINED CYSTOSCOPY, RETROGRADES, URETEROSCOPY, INSERT STENT  1/6/2014    Procedure: COMBINED CYSTOSCOPY, RETROGRADES, URETEROSCOPY, INSERT STENT;  Cystyoscopy place left ureteral stent;  Surgeon: Jaun Kimble MD;  Location: [facility] OR    COMBINED CYSTOSCOPY, RETROGRADES, URETEROSCOPY, INSERT STENT Left 10/23/2018    Procedure: Video cystoscopy, left ureteroscopy with stone extraction;  Surgeon: Bull Mast MD;  Location:  OR    CYSTOSCOPY, URETEROSCOPY, COMBINED Right 9/23/2015    Procedure: COMBINED CYSTOSCOPY, URETEROSCOPY;  Surgeon: ROME Jett MD;  Location: [facility] OR    ENT SURGERY      ESOPHAGOSCOPY, GASTROSCOPY, DUODENOSCOPY (EGD), COMBINED  4/8/2013    Procedure: COMBINED ESOPHAGOSCOPY, GASTROSCOPY, DUODENOSCOPY (EGD), BIOPSY SINGLE OR MULTIPLE;  Gastroscopy;  Surgeon: Peter Pickard MD;  Location: [facility] GI    ESOPHAGOSCOPY, GASTROSCOPY, DUODENOSCOPY (EGD), COMBINED Left 10/28/2014    Procedure: COMBINED ESOPHAGOSCOPY, GASTROSCOPY, DUODENOSCOPY (EGD), BIOPSY SINGLE OR MULTIPLE;  Surgeon: Narcisa Ramirez MD;  Location:  OR    ESOPHAGOSCOPY, GASTROSCOPY, DUODENOSCOPY (EGD), COMBINED N/A 12/24/2018    Procedure: COMBINED ESOPHAGOSCOPY, GASTROSCOPY, DUODENOSCOPY (EGD), BIOPSY SINGLE OR MULTIPLE;  Surgeon: Sonu Verduzco MD;  Location: [facility] GI    INJECT EPIDURAL TRANSFORAMINAL LUMBAR / SACRAL EA ADDITIONAL LEVEL Left 3/18/2021    Procedure: Left L5/S1 transforaminal injection for selective L5 nerve root block;  Surgeon: Eliza Pagan MD;  Location: [facility] OR    LAPAROSCOPIC CHOLECYSTECTOMY  11/20/2014    [facility], Dr. Ramirez    LASER HOLMIUM LITHOTRIPSY URETER(S), INSERT STENT, COMBINED  4/2/2014    Procedure: COMBINED CYSTOSCOPY, URETEROSCOPY, LASER HOLMIUM LITHOTRIPSY URETER(S), INSERT STENT;  Cystoscopy,Left Ureteral Stent Removal,Left Ureteroscopy with Laser Lithotripsy,Left Ureter Stent  Placement;  Surgeon: ROME Jett MD;  Location: [facility] OR    Transurethral stone resection  03/11/2011       Prior to Admission Medications   Prior to Admission Medications   Prescriptions Last Dose Informant Patient Reported? Taking?   ACE/ARB/ARNI NOT PRESCRIBED (INTENTIONAL)   Yes No   Sig: Please choose reason not prescribed from choices below.   ARIPiprazole (ABILIFY) 10 MG tablet Past Month at am  No Yes   Sig: Take 1 tablet (10 mg) by mouth every morning.   PARoxetine (PAXIL) 20 MG tablet Past Month at am  No Yes   Sig: Take 1 tablet (20 mg) by mouth daily.   QUEtiapine (SEROQUEL) 200 MG tablet Past Month at hs  No Yes   Sig: Take 1 tablet (200 mg) by mouth at bedtime   Semaglutide (RYBELSUS) 7 MG tablet Past Month at am  No Yes   Sig: Take 1 tablet (7 mg) by mouth daily.   acetaminophen (TYLENOL) 325 MG tablet prn at prn  Yes Yes   Sig: Take 325-650 mg by mouth every 6 hours as needed for mild pain.   amLODIPine (NORVASC) 5 MG tablet Past Month at am  No Yes   Sig: Take 1 tablet (5 mg) by mouth daily.   blood glucose (NO BRAND SPECIFIED) test strip   No No   Sig: Use to test blood sugar one times daily and as needed. To accompany: Blood Glucose Monitor Brands: per insurance.   clindamycin phos-benzoyl perox (DUAC) 1.2-5 % external gel Past Month  No Yes   Sig: Apply topically daily.   clonazePAM (KLONOPIN) 0.5 MG tablet 10/12/2024  No Yes   Sig: Take 1 tablet (0.5 mg) by mouth 2 times daily as needed for anxiety.   cyclobenzaprine (FLEXERIL) 10 MG tablet prn at prn  No Yes   Sig: Take 1 tablet (10 mg) by mouth every 8 hours as needed for muscle spasms.   empagliflozin (JARDIANCE) 10 MG TABS tablet Past Month at am  No Yes   Sig: Take 1 tablet (10 mg) by mouth daily   gabapentin (NEURONTIN) 600 MG tablet 10/12/2024  No Yes   Sig: Take 2 tablets (1200 mg) morning and evening, 1 tablet mid-day (600 mg)   insulin pen needle (32G X 4 MM) 32G X 4 MM miscellaneous   No No   Sig: Use 1 pen needles daily or as  directed.   naloxone (NARCAN) 4 MG/0.1ML nasal spray prn at prn  No Yes   Sig: Spray 1 spray (4 mg) into one nostril alternating nostrils as needed for opioid reversal. every 2-3 minutes until assistance arrives   ondansetron (ZOFRAN ODT) 4 MG ODT tab prn at prn  No Yes   Sig: Take 1 tablet (4 mg) by mouth every 8 hours as needed for nausea.   oxyCODONE (ROXICODONE) 5 MG tablet prn at prn  No Yes   Sig: Take 1-2 tablets (5-10 mg) by mouth every 6 hours as needed for severe pain.   oxyCODONE (ROXICODONE) 5 MG tablet   No No   Sig: Take 1 tablet (5 mg) by mouth every 6 hours as needed for pain.   oxyCODONE (ROXICODONE) 5 MG tablet   No No   Sig: Take 1 tablet (5 mg) by mouth every 6 hours as needed for pain.   paliperidone (INVEGA) 1.5 MG 24 hr tablet Past Month at am  Yes Yes   Sig: Take 1.5 mg by mouth every morning. Take with 3mg tablet for total dose of 4.5mg   paliperidone ER (INVEGA) 3 MG 24 hr tablet Past Month at am  Yes Yes   Sig: Take 3 mg by mouth every morning. Take with 1.5 mg tablet for total dose of 4.5mg   rosuvastatin (CRESTOR) 40 MG tablet Past Month at am  No Yes   Sig: Take 1 tablet (40 mg) by mouth daily   testosterone (ANDROGEL/TESTIM) 50 MG/5GM (1%) topical gel Past Month at am  No Yes   Sig: Place 2 packets (100 mg of testosterone) onto the skin daily   thin (NO BRAND SPECIFIED) lancets   No No   Sig: Use with lanceting device to check blood sugars once per day. To accompany: Blood Glucose Monitor Brands: per insurance.   traZODone (DESYREL) 50 MG tablet prn at prn  No Yes   Sig: Take 1 tablet (50 mg) by mouth nightly as needed for sleep.   valACYclovir (VALTREX) 1000 mg tablet prn at prn  No Yes   Sig: Take 2 tablets (2,000 mg) by mouth 2 times daily   Patient taking differently: Take 2,000 mg by mouth 2 times daily as needed.      Facility-Administered Medications: None     Allergies   Allergies   Allergen Reactions    Haldol [Haloperidol] Other (See Comments)     Makes patient very angry and  anxious    Adhesive Tape Hives    Prednisone Other (See Comments) and Hives     Suicidal ideation    Droperidol Anxiety       Social History   I have reviewed this patient's social history and updated it with pertinent information if needed. Prakash Prasad  reports that he quit smoking about 2 years ago. His smoking use included other and cigarettes. He started smoking about 13 years ago. He has a 5.6 pack-year smoking history. He has never been exposed to tobacco smoke. He quit smokeless tobacco use about 4 years ago.  His smokeless tobacco use included chew. He reports current alcohol use. He reports that he does not currently use drugs after having used the following drugs: Marijuana and Opiates.    Family History   I have reviewed this patient's family history and updated it with pertinent information if needed.   Family History   Problem Relation Age of Onset    Hyperlipidemia Mother     Mental Illness Mother     Anxiety Disorder Mother     Anesthesia Reaction Mother         Vomiting/Nausea    Other Cancer Mother     Skin Cancer Mother     Gastrointestinal Disease Father         Crohn's Disease    Cancer Father         Liver Cancer    Other Cancer Father         Liver    Obesity Father     Skin Cancer Father     No Known Problems Sister     Hypertension Brother     Other Cancer Brother         Esophagecial    Diabetes Brother     Obesity Brother     Hypertension Brother     Other Cancer Brother     Cancer Maternal Grandmother         lympoma    Diabetes Maternal Grandmother     Mental Illness Maternal Grandmother     Other Cancer Maternal Grandmother         Non Hodgkins Lymphoma    Diabetes Maternal Grandfather     Hypertension Maternal Grandfather     Prostate Cancer Maternal Grandfather     Hyperlipidemia Maternal Grandfather     Heart Disease Paternal Grandfather         heart disease    Other Cancer Paternal Half-Brother        Review of Systems   The 10 point Review of Systems is negative other than  noted in the HPI or here.     Physical Exam   Temp: 97.7  F (36.5  C) Temp src: Temporal BP: (!) 138/91 Pulse: 83   Resp: 16 SpO2: 96 % O2 Device: None (Room air)    Vital Signs with Ranges  Temp:  [97.7  F (36.5  C)-98.3  F (36.8  C)] 97.7  F (36.5  C)  Pulse:  [83-94] 83  Resp:  [16-18] 16  BP: (108-138)/(60-91) 138/91  SpO2:  [92 %-96 %] 96 %  220 lbs 0 oz    Constitutional: awake, alert, cooperative, no apparent distress, and appears stated age  Eyes: extra-ocular muscles intact, no scleral icterus  ENT: normocepalic, atraumatic without obvious abnormality  Respiratory: no increased work of breathing, symmetric chest wall expansion    MSK:  Right Upper Extremity  Skin is intact. Swelling noted along ulnar hand  ROM: limited due to pain  Tenderness to palpation over base of 5th MC  No tenderness to palpation over the other digits/MCs  Elbow ROM is intact  Sensations intact to light touch in the median, ulnar, radial nerve distributions  FPL, EPL, Intrinsic hand muscles are intact  2+ radial pulse    Left Upper Extremity  Skin is intact. No swelling or ecchymosis  No tenderness to palpation over the shoulder, arm, elbow, forearm, wrist or hand.  No pain with ROM of the shoulder, elbow, wrist, hand  Sensations intact to light touch in the median, ulnar, radial nerve distributions  FPL, EPL, Intrinsic hand muscles are intact  2+ radial pulse      Data   Most Recent 3 CBC's:  Recent Labs   Lab Test 10/19/24  0625 10/17/24  0549 10/12/24  2056 09/26/24  0814   WBC  --  7.5 14.6* 9.7   HGB  --  15.0 15.9 14.1   MCV  --  86 84 87    244 295 239     Most Recent 3 BMP's:  Recent Labs   Lab Test 10/20/24  1238 10/20/24  0858 10/19/24  2135 10/19/24  0712 10/19/24  0625 10/17/24  0720 10/17/24  0549 10/15/24  1124 10/12/24  2055   NA  --   --   --   --  138  --  139  --  137   POTASSIUM  --   --   --   --  4.1  --  4.3  --  3.8   CHLORIDE  --   --   --   --  102  --  104  --  100   CO2  --   --   --   --  23  --  18*   --  22   BUN  --   --   --   --  14.7  --  12.9  --  11.1   CR  --   --   --   --  0.71  --  0.66  --  0.76   ANIONGAP  --   --   --   --  13  --  17*  --  15   WILLIASM  --   --   --   --  9.6  --  9.1  --  9.9   * 171* 193*   < > 129*   < > 185*   < > 141*    < > = values in this interval not displayed.     Most Recent 3 INR's:  Recent Labs   Lab Test 10/12/24  2055   INR 1.01

## 2024-10-21 ENCOUNTER — APPOINTMENT (OUTPATIENT)
Dept: OCCUPATIONAL THERAPY | Facility: CLINIC | Age: 34
DRG: 918 | End: 2024-10-21
Payer: MEDICARE

## 2024-10-21 LAB
GLUCOSE BLDC GLUCOMTR-MCNC: 124 MG/DL (ref 70–99)
GLUCOSE BLDC GLUCOMTR-MCNC: 125 MG/DL (ref 70–99)
GLUCOSE BLDC GLUCOMTR-MCNC: 134 MG/DL (ref 70–99)
GLUCOSE BLDC GLUCOMTR-MCNC: 147 MG/DL (ref 70–99)
GLUCOSE BLDC GLUCOMTR-MCNC: 166 MG/DL (ref 70–99)
GLUCOSE BLDC GLUCOMTR-MCNC: 216 MG/DL (ref 70–99)

## 2024-10-21 PROCEDURE — 250N000013 HC RX MED GY IP 250 OP 250 PS 637: Performed by: STUDENT IN AN ORGANIZED HEALTH CARE EDUCATION/TRAINING PROGRAM

## 2024-10-21 PROCEDURE — 250N000013 HC RX MED GY IP 250 OP 250 PS 637: Performed by: HOSPITALIST

## 2024-10-21 PROCEDURE — 250N000013 HC RX MED GY IP 250 OP 250 PS 637: Performed by: EMERGENCY MEDICINE

## 2024-10-21 PROCEDURE — 99232 SBSQ HOSP IP/OBS MODERATE 35: CPT

## 2024-10-21 PROCEDURE — 250N000011 HC RX IP 250 OP 636: Performed by: INTERNAL MEDICINE

## 2024-10-21 PROCEDURE — 250N000012 HC RX MED GY IP 250 OP 636 PS 637: Performed by: INTERNAL MEDICINE

## 2024-10-21 PROCEDURE — 97165 OT EVAL LOW COMPLEX 30 MIN: CPT | Mod: GO

## 2024-10-21 PROCEDURE — 250N000013 HC RX MED GY IP 250 OP 250 PS 637: Performed by: INTERNAL MEDICINE

## 2024-10-21 PROCEDURE — 99232 SBSQ HOSP IP/OBS MODERATE 35: CPT | Performed by: INTERNAL MEDICINE

## 2024-10-21 PROCEDURE — 97110 THERAPEUTIC EXERCISES: CPT | Mod: GO

## 2024-10-21 PROCEDURE — A9270 NON-COVERED ITEM OR SERVICE: HCPCS | Performed by: EMERGENCY MEDICINE

## 2024-10-21 PROCEDURE — 120N000001 HC R&B MED SURG/OB

## 2024-10-21 RX ADMIN — VANCOMYCIN HYDROCHLORIDE 125 MG: 125 CAPSULE ORAL at 11:51

## 2024-10-21 RX ADMIN — VANCOMYCIN HYDROCHLORIDE 125 MG: 125 CAPSULE ORAL at 16:11

## 2024-10-21 RX ADMIN — HYDROCORTISONE: 25 CREAM TOPICAL at 19:01

## 2024-10-21 RX ADMIN — EMPAGLIFLOZIN 10 MG: 10 TABLET, FILM COATED ORAL at 07:56

## 2024-10-21 RX ADMIN — CLONAZEPAM 0.5 MG: 0.5 TABLET ORAL at 19:00

## 2024-10-21 RX ADMIN — NICOTINE POLACRILEX 4 MG: 2 GUM, CHEWING ORAL at 08:09

## 2024-10-21 RX ADMIN — HYDROCORTISONE: 25 CREAM TOPICAL at 08:06

## 2024-10-21 RX ADMIN — PALIPERIDONE 3 MG: 3 TABLET, EXTENDED RELEASE ORAL at 07:59

## 2024-10-21 RX ADMIN — NICOTINE POLACRILEX 4 MG: 2 GUM, CHEWING ORAL at 21:57

## 2024-10-21 RX ADMIN — NICOTINE POLACRILEX 4 MG: 2 GUM, CHEWING ORAL at 16:48

## 2024-10-21 RX ADMIN — HYDROXYZINE HYDROCHLORIDE 25 MG: 25 TABLET ORAL at 04:37

## 2024-10-21 RX ADMIN — GABAPENTIN 1200 MG: 600 TABLET, FILM COATED ORAL at 21:50

## 2024-10-21 RX ADMIN — CLONAZEPAM 0.5 MG: 0.5 TABLET ORAL at 13:01

## 2024-10-21 RX ADMIN — GABAPENTIN 1200 MG: 600 TABLET, FILM COATED ORAL at 07:57

## 2024-10-21 RX ADMIN — ONDANSETRON 4 MG: 4 TABLET, ORALLY DISINTEGRATING ORAL at 05:31

## 2024-10-21 RX ADMIN — VANCOMYCIN HYDROCHLORIDE 125 MG: 125 CAPSULE ORAL at 07:57

## 2024-10-21 RX ADMIN — ACETAMINOPHEN 650 MG: 325 TABLET, FILM COATED ORAL at 21:56

## 2024-10-21 RX ADMIN — OXYCODONE HYDROCHLORIDE 10 MG: 5 TABLET ORAL at 16:47

## 2024-10-21 RX ADMIN — NICOTINE 1 PATCH: 14 PATCH, EXTENDED RELEASE TRANSDERMAL at 08:00

## 2024-10-21 RX ADMIN — NICOTINE POLACRILEX 4 MG: 2 GUM, CHEWING ORAL at 04:38

## 2024-10-21 RX ADMIN — NICOTINE POLACRILEX 4 MG: 2 GUM, CHEWING ORAL at 06:37

## 2024-10-21 RX ADMIN — NICOTINE POLACRILEX 4 MG: 2 GUM, CHEWING ORAL at 17:58

## 2024-10-21 RX ADMIN — HYDROXYZINE HYDROCHLORIDE 25 MG: 25 TABLET ORAL at 17:56

## 2024-10-21 RX ADMIN — NICOTINE POLACRILEX 4 MG: 2 GUM, CHEWING ORAL at 13:00

## 2024-10-21 RX ADMIN — CYCLOBENZAPRINE 10 MG: 10 TABLET, FILM COATED ORAL at 15:05

## 2024-10-21 RX ADMIN — ROSUVASTATIN CALCIUM 40 MG: 20 TABLET, FILM COATED ORAL at 07:55

## 2024-10-21 RX ADMIN — PAROXETINE HYDROCHLORIDE 20 MG: 20 TABLET, FILM COATED ORAL at 07:57

## 2024-10-21 RX ADMIN — QUETIAPINE 200 MG: 200 TABLET, FILM COATED ORAL at 21:48

## 2024-10-21 RX ADMIN — OXYCODONE HYDROCHLORIDE 5 MG: 5 TABLET ORAL at 04:37

## 2024-10-21 RX ADMIN — ENOXAPARIN SODIUM 40 MG: 40 INJECTION SUBCUTANEOUS at 21:50

## 2024-10-21 RX ADMIN — ACETAMINOPHEN 650 MG: 325 TABLET, FILM COATED ORAL at 15:05

## 2024-10-21 RX ADMIN — TESTOSTERONE 100 MG OF TESTOSTERONE: 50 GEL TRANSDERMAL at 09:17

## 2024-10-21 RX ADMIN — MICONAZOLE NITRATE: 20 POWDER TOPICAL at 21:53

## 2024-10-21 RX ADMIN — OXYCODONE HYDROCHLORIDE 5 MG: 5 TABLET ORAL at 10:51

## 2024-10-21 RX ADMIN — MICONAZOLE NITRATE: 20 POWDER TOPICAL at 09:20

## 2024-10-21 RX ADMIN — POLYETHYLENE GLYCOL 3350 17 G: 17 POWDER, FOR SOLUTION ORAL at 07:52

## 2024-10-21 RX ADMIN — VANCOMYCIN HYDROCHLORIDE 125 MG: 125 CAPSULE ORAL at 21:51

## 2024-10-21 RX ADMIN — AMLODIPINE BESYLATE 10 MG: 10 TABLET ORAL at 07:58

## 2024-10-21 RX ADMIN — INSULIN ASPART 1 UNITS: 100 INJECTION, SOLUTION INTRAVENOUS; SUBCUTANEOUS at 21:57

## 2024-10-21 RX ADMIN — ACETAMINOPHEN 650 MG: 325 TABLET, FILM COATED ORAL at 09:19

## 2024-10-21 RX ADMIN — PALIPERIDONE 1.5 MG: 1.5 TABLET, EXTENDED RELEASE ORAL at 07:58

## 2024-10-21 RX ADMIN — ARIPIPRAZOLE 10 MG: 10 TABLET ORAL at 07:56

## 2024-10-21 ASSESSMENT — ACTIVITIES OF DAILY LIVING (ADL)
ADLS_ACUITY_SCORE: 25

## 2024-10-21 NOTE — PROGRESS NOTES
Care Management Discharge Note    Discharge Date: 10/21/2024       Discharge Disposition: Home (mental health)    Discharge Services: Mental Health Resources    Discharge DME:      Discharge Transportation: family or friend will provide    Private pay costs discussed: Not applicable    Persons Notified of Discharge Plans: patient and mental health   Patient/Family in Agreement with the Plan: yes    Additional Information:  Pt cleared to discharge home by mental health, see note for further info. AUGIE talked to supervisor  FLORIDA Alberto, [phone], recommendations to discharge home and  therapy appointment scheduled.  AUGIE reviewed provisional discharge with slick Mccoy.      AUGIE met with pt and discussed discharge plan.  Pt signed provisional discharge paper, faxed to Nadine, [phone] and provided pt a copy.  Original placed in paper chart.    Update: pt not medically ready to discharge today, provider will discharge tomorrow. Updated Nadine and updated provisional discharge for tomorrow. Faxed update.    DESIRE Hughes, [facility] Center[facility]  Inpatient Care Coordination  [facility]  [phone]       RAÚL CHAUHAN, Maine[facility] Medical CenterAUGIE

## 2024-10-21 NOTE — PROGRESS NOTES
[facility]  Infectious Disease Progress Note          Assessment and Plan:   Date of Admission:  10/12/2024  Date of Consult (When I saw the patient): 10/17/24        Assessment & Plan  Prakash Prasad is a 34 year old adult who was admitted on 10/12/2024.      Impression: 1 34-year-old male, housed in ER for several days awaiting psychiatric disposition with multiple underlying psychiatric issues  2 mildly abnormal urinalysis given Keflex, urine culture eventually unremarkable and not obvious true UTI  3 acute diarrhea, multiple loose stools continuing, crampy abdominal pain, C. difficile PCR positive but toxin negative implying colonization, also of interest #4 below, however hard to exclude that this is true C. difficile colitis from the current antibiotic course  4 prior C. difficile colitis in 2018 until 2020, even then a typical pattern a typical response to treatment, consideration was colonization rather than true infection and then eventually resolved and does not have ongoing chronic diarrhea     REC 1 clear improvement in both crampy abdominal pain and loose stools, appears to be responding, still not entirely clear this is true C. difficile colitis but treat accordingly, complete a full 2-week course of 125 4 times daily of vancomycin           Interval History:     no new complaints , formed stools, crampy abdominal pain better no major fever or systemic symptoms no new positive studies CT scan without any obvious issue including no major colitis              Medications:     Current Facility-Administered Medications   Medication Dose Route Frequency Provider Last Rate Last Admin    amLODIPine (NORVASC) tablet 10 mg  10 mg Oral Daily Osbaldo Davis MD   10 mg at 10/21/24 0758    ARIPiprazole (ABILIFY) tablet 10 mg  10 mg Oral QAM Kwame Encarnacion MD   10 mg at 10/21/24 0756    empagliflozin (JARDIANCE) tablet 10 mg  10 mg Oral Daily Kwame Encarnacion MD   10 mg at  "10/21/24 0756    enoxaparin ANTICOAGULANT (LOVENOX) injection 40 mg  40 mg Subcutaneous Q24H Osbaldo Davis MD   40 mg at 10/20/24 2121    gabapentin (NEURONTIN) tablet 1,200 mg  1,200 mg Oral BID Osbaldo Davis MD   1,200 mg at 10/21/24 0757    hydrocortisone 2.5 % cream   Topical BID Chandni Davis MD   Given at 10/21/24 0806    insulin aspart (NovoLOG) injection (RAPID ACTING)  1-7 Units Subcutaneous TID AC Osbaldo Davis MD   1 Units at 10/21/24 0804    insulin aspart (NovoLOG) injection (RAPID ACTING)  1-5 Units Subcutaneous At Bedtime Osbaldo Davis MD        miconazole (MICATIN) 2 % powder   Topical BID Chandni Davis MD   Given at 10/21/24 0920    nicotine (NICODERM CQ) 14 MG/24HR 24 hr patch 1 patch  1 patch Transdermal Daily Hardik Doran DO   1 patch at 10/21/24 0800    paliperidone (INVEGA) 24 hr tablet 1.5 mg  1.5 mg Oral QAM Kwame Encarnacion MD   1.5 mg at 10/21/24 0758    paliperidone ER (INVEGA) 24 hr tablet 3 mg  3 mg Oral QAM Kwame Encarnacion MD   3 mg at 10/21/24 0759    PARoxetine (PAXIL) tablet 20 mg  20 mg Oral Daily Kwame Encarnacion MD   20 mg at 10/21/24 0757    polyethylene glycol (MIRALAX) Packet 17 g  17 g Oral Daily Chandni Davis MD   17 g at 10/21/24 0752    QUEtiapine (SEROquel) tablet 200 mg  200 mg Oral At Bedtime Tank Durbin MD   200 mg at 10/20/24 2121    rosuvastatin (CRESTOR) tablet 40 mg  40 mg Oral Daily Kwame Encarnacion MD   40 mg at 10/21/24 0755    testosterone (ANDROGEL/TESTIM) 50 MG/5GM (1%) topical gel 100 mg of testosterone  100 mg of testosterone Transdermal Daily Kwame Encarnacion MD   100 mg of testosterone at 10/21/24 0917    vancomycin (VANCOCIN) capsule 125 mg  125 mg Oral 4x Daily Lamine Vieyra MD   125 mg at 10/21/24 1151                  Physical Exam:   Blood pressure (!) 141/87, pulse 94, temperature 97.3  F (36.3  C), temperature source Temporal, resp. rate 16, height 1.676 m (5' 6\"), weight 99.8 kg (220 lb), SpO2 95%, not " currently breastfeeding.  Wt Readings from Last 2 Encounters:   10/16/24 99.8 kg (220 lb)   10/02/24 105.9 kg (233 lb 6.4 oz)     Vital Signs with Ranges  Temp:  [97.3  F (36.3  C)-99.4  F (37.4  C)] 97.3  F (36.3  C)  Pulse:  [83-94] 94  Resp:  [16] 16  BP: (115-141)/(66-91) 141/87  SpO2:  [92 %-97 %] 95 %    Constitutional: Awake, alert, cooperative, no apparent distress     Lungs: Clear to auscultation bilaterally, no crackles or wheezing   Cardiovascular: Regular rate and rhythm, normal S1 and S2, and no murmur noted   Abdomen: Normal bowel sounds, soft, non-distended, non-tender   Skin: No rashes, no cyanosis, no edema   Other:           Data:   All microbiology laboratory data reviewed.  Recent Labs   Lab Test 10/19/24  0625 10/17/24  0549 10/12/24  2056 09/26/24  0814   WBC  --  7.5 14.6* 9.7   HGB  --  15.0 15.9 14.1   HCT  --  46.0 47.6 42.6   MCV  --  86 84 87    244 295 239     Recent Labs   Lab Test 10/19/24  0625 10/17/24  0549 10/12/24  2055   CR 0.71 0.66 0.76     No lab results found.  Recent Labs   Lab Test 01/18/19  1538 12/07/18  1203 11/29/18  1644 10/13/18  1705 01/27/17  0935   CULT No beta hemolytic Streptococcus Group A isolated No beta hemolytic Streptococcus Group A isolated No beta hemolytic Streptococcus Group A isolated 50,000 to 100,000 colonies/mL  mixed urogenital elgin  Susceptibility testing not routinely done   >100,000 colonies/mL Coagulase negative Staphylococcus  10,000 to 50,000 colonies/mL Pseudomonas aeruginosa  *

## 2024-10-21 NOTE — PROGRESS NOTES
"   10/21/24 1300   Appointment Info   Signing Clinician's Name / Credentials (OT) Jyoti Samson, OTR/L   Rehab Comments (OT) NWB R hand   Living Environment   People in Home alone   Current Living Arrangements apartment   Home Accessibility stairs to enter home   Transportation Anticipated family or friend will provide   Self-Care   Activity/Exercise/Self-Care Comment indep with ADLs/IADLs at baseline   General Information   Onset of Illness/Injury or Date of Surgery 10/12/24   Referring Physician Juliana Alvarado PA-C   Patient/Family Therapy Goal Statement (OT) return home   Additional Occupational Profile Info/Pertinent History of Current Problem per chart: Prakash Prasad  is a 34 year old transgender male with a history of ADHD, bipolar, schizoaffective disorder, type 2 diabetes history of TBI, substance abuse who was admitted to [facility] after a drug overdose and is awaiting psychiatric disposition.  He was noted to have base of fifth metacarpal pain and was found to have a minimally displaced left base of fifth metacarpal fracture.  He has been in a splint since arrival to the emergency department on October 12, 2024.  Swelling has been improving.  Radiographs were reviewed and reveal a minimally displaced fracture at the base of the fifth metacarpal.  I recommended transitioning to a Ace wrap for compression and working on gradual range of motion as tolerated.  May utilize the sling to help with elevation.   Right Upper Extremity (Weight-bearing Status) non weight-bearing (NWB)   General Observations and Info \"Recommend follow-up in 2 weeks for repeat x-rays  May remove Ace wrap as needed for hygiene  Encourage range of motion  No lifting more than 5 pound, no weightbearing\"   Cognitive Status Examination   Cognitive Status Comments no concerns   Pain Assessment   Patient Currently in Pain Yes, see Vital Sign flowsheet  (7/10)   Range of Motion Comprehensive   Comment, General Range of Motion " limited ROM in R wrist and digits limited by pain and swelling   Bed Mobility   Comment (Bed Mobility) mod I   Transfers   Transfer Comments mod I   Activities of Daily Living   BADL Assessment/Intervention upper body dressing;lower body dressing   Upper Body Dressing Assessment/Training   Comment, (Upper Body Dressing) pt reports indep   Lower Body Dressing Assessment/Training   Comment, (Lower Body Dressing) pt reports indep   Clinical Impression   Criteria for Skilled Therapeutic Interventions Met (OT) Yes, treatment indicated   OT Diagnosis hand fracture   OT Problem List-Impairments impacting ADL problems related to;range of motion (ROM);pain   Assessment of Occupational Performance 1-3 Performance Deficits   Identified Performance Deficits IADLs   Planned Therapy Interventions (OT) ROM;home program guidelines;progressive activity/exercise;risk factor education   Clinical Decision Making Complexity (OT) problem focused assessment/low complexity   Risk & Benefits of therapy have been explained evaluation/treatment results reviewed;care plan/treatment goals reviewed;risks/benefits reviewed;current/potential barriers reviewed;participants voiced agreement with care plan;participants included;patient   Clinical Impression Comments Pt reports he has support for IADLs as needed; no concerns with ADLs   OT Total Evaluation Time   OT Eval, Low Complexity Minutes (39842) 10   OT Goals   Therapy Frequency (OT) One time eval and treatment   OT Predicted Duration/Target Date for Goal Attainment 10/21/24   OT Goals OT Goal 1;OT Goal 2   OT: Goal 1 Pt will demonstrate indep with RUE ROM. - goal met   OT: Goal 2 Pt will verbalize WB status. - goal met   Interventions   Interventions Quick Adds Therapeutic Procedures/Exercise   Therapeutic Procedures/Exercise   Therapeutic Procedure: strength, endurance, ROM, flexibillity minutes (29418) 30   Treatment Detail/Skilled Intervention Pt instructed in RUE exercises including digit  flexion/extension,wrist flexion/extension, wrist radial/ulnar deviation, forearm pronation/supination, elbow flexion/extension, shoulder flexion/extension, and shoulder ab/adduction at 1 set x10 reps. Pt provided handout. Pt instructed to complete 3x/day. Pt able to verbalize WB precautions. Pt educated in wrapping compression wraps and sent youtube video for reference. Pt encouraged to use hand functionally such as with writing and eat. Pt wants hard cast or immobilizer; message sent to ortho MD.   OT Discharge Planning   OT Plan d/c OT   OT Discharge Recommendation (DC Rec) home with assist   OT Rationale for DC Rec Anticipate pt will be able to d/c home with assist from family/friends with extensive IADLs.   OT Brief overview of current status indep with mobility; indep with exercises and WB precautions   Total Session Time   Timed Code Treatment Minutes 30   Total Session Time (sum of timed and untimed services) 40

## 2024-10-21 NOTE — PLAN OF CARE
"Goal Outcome Evaluation:    VSS stable. CSSRS scored 0. Sitter no longer required. Psych following. Pain controlled with tylenol, oxycodone, gabapentin, and flexeril. PRN Clonapine given for anxiety with relief of symptoms. Enteric precautions for CDIFF in place. Pt independent in room. Discharge to home tomorrow after  is able to release court order.       Plan of Care Reviewed With: patient    Overall Patient Progress: improvingOverall Patient Progress: improving       Problem: Adult Inpatient Plan of Care  Goal: Plan of Care Review  Description: The Plan of Care Review/Shift note should be completed every shift.  The Outcome Evaluation is a brief statement about your assessment that the patient is improving, declining, or no change.  This information will be displayed automatically on your shift  note.  Outcome: Progressing  Flowsheets (Taken 10/21/2024 1510)  Plan of Care Reviewed With: patient  Overall Patient Progress: improving  Goal: Patient-Specific Goal (Individualized)  Description: You can add care plan individualizations to a care plan. Examples of Individualization might be:  \"Parent requests to be called daily at 9am for status\", \"I have a hard time hearing out of my right ear\", or \"Do not touch me to wake me up as it startles  me\".  Outcome: Progressing  Goal: Absence of Hospital-Acquired Illness or Injury  Outcome: Progressing  Intervention: Identify and Manage Fall Risk  Recent Flowsheet Documentation  Taken 10/21/2024 0843 by Annmarie Wilks, RN  Safety Promotion/Fall Prevention:   activity supervised   assistive device/personal items within reach   nonskid shoes/slippers when out of bed   bedside attendant  Intervention: Prevent Skin Injury  Recent Flowsheet Documentation  Taken 10/21/2024 0843 by Annmarie Wilks, RN  Body Position: position changed independently  Skin Protection: adhesive use limited  Device Skin Pressure Protection: absorbent pad utilized/changed  Intervention: " Prevent and Manage VTE (Venous Thromboembolism) Risk  Recent Flowsheet Documentation  Taken 10/21/2024 0843 by Annmarei Wilks RN  VTE Prevention/Management: SCDs off (sequential compression devices)  Intervention: Prevent Infection  Recent Flowsheet Documentation  Taken 10/21/2024 0843 by Annmarie Wilks RN  Infection Prevention:   hand hygiene promoted   personal protective equipment utilized   single patient room provided  Goal: Optimal Comfort and Wellbeing  Outcome: Progressing  Intervention: Monitor Pain and Promote Comfort  Recent Flowsheet Documentation  Taken 10/21/2024 0843 by Annmarie Wilks RN  Pain Management Interventions: medication (see MAR)  Goal: Readiness for Transition of Care  Outcome: Progressing     Problem: Seclusion/Restraint, Behavioral  Goal: Seclusion/Behavioral Restraint Goal: Absence of Harm or Injury  Outcome: Progressing  Intervention: Protect Skin and Joint Integrity  Recent Flowsheet Documentation  Taken 10/21/2024 0843 by Annmarie Wilks RN  Body Position: position changed independently  Skin Protection: adhesive use limited  Range of Motion: active ROM (range of motion) encouraged     Problem: Suicide Risk  Goal: Absence of Self-Harm  Outcome: Progressing  Intervention: Assess Risk to Self and Maintain Safety  Recent Flowsheet Documentation  Taken 10/21/2024 0843 by Annmarie Wilks RN  Enhanced Safety Measures: pain management     Problem: Pain Acute  Goal: Optimal Pain Control and Function  Outcome: Progressing  Intervention: Develop Pain Management Plan  Recent Flowsheet Documentation  Taken 10/21/2024 0843 by Annmarie Wilks RN  Pain Management Interventions: medication (see MAR)  Intervention: Prevent or Manage Pain  Recent Flowsheet Documentation  Taken 10/21/2024 0843 by Annmarie Wilks RN  Medication Review/Management: medications reviewed     Problem: Diarrhea  Goal: Effective Diarrhea Management  Outcome: Progressing  Intervention: Manage Diarrhea  Recent  Flowsheet Documentation  Taken 10/21/2024 0843 by Annmarie Wilks, RN  Isolation Precautions: enteric precautions maintained  Medication Review/Management: medications reviewed

## 2024-10-21 NOTE — PLAN OF CARE
"Rash to armpits, under breasts, and now flako-area; managed with sched. hydrocortisone cream & miconazole powder & PRN benadryl.  Pain managed with PRN PO pain meds.  No nausea.  Ortho Consulted, KATIE TINOCO, encouraged sling on for elevation.  Diarrhea improving, stool more formed.  Up ad joshua., ambulating.  Pt expressed wishes DC home vs. psych inpt bed, denies any SI.        Goal Outcome Evaluation:    Plan of Care Reviewed With: patient    Overall Patient Progress: no changeOverall Patient Progress: no change    Outcome Evaluation: New rash to armpits, under breasts, and flako-area; managed with sched. hydrocortisone cream & miconazole powder & PRN benadryl.  Stool now form/soft, no diarrhea.    Problem: Adult Inpatient Plan of Care  Goal: Plan of Care Review  Description: The Plan of Care Review/Shift note should be completed every shift.  The Outcome Evaluation is a brief statement about your assessment that the patient is improving, declining, or no change.  This information will be displayed automatically on your shift  note.  Outcome: Not Progressing  Flowsheets (Taken 10/20/2024 1858)  Outcome Evaluation:   New rash to armpits, under breasts, and flako-area   managed with sched. hydrocortisone cream & miconazole powder & PRN benadryl.  Stool now form/soft, no diarrhea.  Plan of Care Reviewed With: patient  Overall Patient Progress: no change  Goal: Patient-Specific Goal (Individualized)  Description: You can add care plan individualizations to a care plan. Examples of Individualization might be:  \"Parent requests to be called daily at 9am for status\", \"I have a hard time hearing out of my right ear\", or \"Do not touch me to wake me up as it startles  me\".  Outcome: Not Progressing  Goal: Absence of Hospital-Acquired Illness or Injury  Outcome: Not Progressing  Intervention: Identify and Manage Fall Risk  Recent Flowsheet Documentation  Taken 10/20/2024 0800 by Elina Rao RN  Safety Promotion/Fall Prevention:   " bedside attendant   assistive device/personal items within reach   activity supervised   clutter free environment maintained   increase visualization of patient   lighting adjusted   nonskid shoes/slippers when out of bed   patient and family education   room organization consistent   safety round/check completed   supervised activity  Intervention: Prevent Skin Injury  Recent Flowsheet Documentation  Taken 10/20/2024 1319 by Elina Rao RN  Body Position: position changed independently  Taken 10/20/2024 0800 by Elina Rao RN  Body Position: position changed independently  Skin Protection: adhesive use limited  Device Skin Pressure Protection: adhesive use limited  Intervention: Prevent and Manage VTE (Venous Thromboembolism) Risk  Recent Flowsheet Documentation  Taken 10/20/2024 0800 by Elina Rao RN  VTE Prevention/Management: (up walking freq.) SCDs off (sequential compression devices)  Intervention: Prevent Infection  Recent Flowsheet Documentation  Taken 10/20/2024 0800 by Elina Rao RN  Infection Prevention:   rest/sleep promoted   personal protective equipment utilized   hand hygiene promoted   equipment surfaces disinfected   environmental surveillance performed   single patient room provided  Goal: Optimal Comfort and Wellbeing  Outcome: Not Progressing  Intervention: Monitor Pain and Promote Comfort  Recent Flowsheet Documentation  Taken 10/20/2024 1832 by Elina Rao RN  Pain Management Interventions: medication (see MAR)  Taken 10/20/2024 1740 by Elina Rao RN  Pain Management Interventions:   medication (see MAR)   emotional support  Taken 10/20/2024 1602 by Elina Rao RN  Pain Management Interventions: rest  Taken 10/20/2024 1520 by Elina Rao RN  Pain Management Interventions: rest  Taken 10/20/2024 1415 by Elina Rao RN  Pain Management Interventions: rest  Taken 10/20/2024 1319 by Elina Rao RN  Pain Management Interventions: medication (see MAR)  Taken  10/20/2024 1200 by Elina Rao RN  Pain Management Interventions: rest  Taken 10/20/2024 1107 by Elina Rao RN  Pain Management Interventions: medication (see MAR)  Taken 10/20/2024 0945 by Elina Rao RN  Pain Management Interventions: cold applied  Taken 10/20/2024 0800 by Elina Rao RN  Pain Management Interventions:   pain management plan reviewed with patient/caregiver   medication (see MAR)   cold applied   emotional support   rest  Goal: Readiness for Transition of Care  Outcome: Not Progressing     Problem: Seclusion/Restraint, Behavioral  Goal: Seclusion/Behavioral Restraint Goal: Absence of Harm or Injury  Outcome: Not Progressing  Intervention: Protect Skin and Joint Integrity  Recent Flowsheet Documentation  Taken 10/20/2024 1319 by Elina Rao RN  Body Position: position changed independently  Taken 10/20/2024 0800 by Elina Rao RN  Body Position: position changed independently  Skin Protection: adhesive use limited     Problem: Suicide Risk  Goal: Absence of Self-Harm  Outcome: Not Progressing  Intervention: Assess Risk to Self and Maintain Safety  Recent Flowsheet Documentation  Taken 10/20/2024 0800 by Elina Rao RN  Self-Harm Prevention:   observed one-to-one   environment modified for self-harm risk   environmental self-harm risks assessed  Enhanced Safety Measures:   pain management    at bedside   patient/family teach back on injury risk   review medications for side effects with activity   assistive devices when indicated  Intervention: Promote Psychosocial Wellbeing  Recent Flowsheet Documentation  Taken 10/20/2024 0800 by Elina Rao RN  Supportive Measures:   active listening utilized   self-care encouraged     Problem: Pain Acute  Goal: Optimal Pain Control and Function  Outcome: Not Progressing  Intervention: Develop Pain Management Plan  Recent Flowsheet Documentation  Taken 10/20/2024 1832 by Elina Rao RN  Pain Management Interventions:  medication (see MAR)  Taken 10/20/2024 1740 by Elina Rao RN  Pain Management Interventions:   medication (see MAR)   emotional support  Taken 10/20/2024 1602 by Elina Rao RN  Pain Management Interventions: rest  Taken 10/20/2024 1520 by Elina Rao RN  Pain Management Interventions: rest  Taken 10/20/2024 1415 by Elina Rao RN  Pain Management Interventions: rest  Taken 10/20/2024 1319 by Elina Rao RN  Pain Management Interventions: medication (see MAR)  Taken 10/20/2024 1200 by Elina Rao RN  Pain Management Interventions: rest  Taken 10/20/2024 1107 by Elina Rao RN  Pain Management Interventions: medication (see MAR)  Taken 10/20/2024 0945 by Elina Rao RN  Pain Management Interventions: cold applied  Taken 10/20/2024 0800 by Elina Rao RN  Pain Management Interventions:   pain management plan reviewed with patient/caregiver   medication (see MAR)   cold applied   emotional support   rest  Intervention: Prevent or Manage Pain  Recent Flowsheet Documentation  Taken 10/20/2024 0800 by Elina Rao RN  Bowel Elimination Promotion:   adequate fluid intake promoted   ambulation promoted  Medication Review/Management: medications reviewed  Intervention: Optimize Psychosocial Wellbeing  Recent Flowsheet Documentation  Taken 10/20/2024 0800 by Elina Rao RN  Supportive Measures:   active listening utilized   self-care encouraged     Problem: Diarrhea  Goal: Effective Diarrhea Management  Outcome: Not Progressing  Intervention: Manage Diarrhea  Recent Flowsheet Documentation  Taken 10/20/2024 0800 by Elina Rao RN  Fluid/Electrolyte Management: fluids provided  Isolation Precautions: enteric precautions maintained  Medication Review/Management: medications reviewed

## 2024-10-21 NOTE — PROGRESS NOTES
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 on 10/12/2024 after drug overdose with 20 pills of 400 mg gabapentin and 10 Klonopin few hours before arriving in a suicidal attempt.  Patient was awaiting transfer to a close psych unit when developed diarrhea with nausea and C. difficile antigen was positive with C. difficile toxin negative.  ID was consulted and given the patient's symptomatology oral vancomycin was started on 10/17/2024.   Patient also has right nondisplaced fracture of the base of the fifth metatarsal that was sustained on 10/14/2024 after patient punched the wall.          Date of Admission:  10/12/2024  Assessment & Plan       Acute diarrhea  C. difficile positive consistent with colonization though symptoms improved with treatment  In a patient who was on antibiotics recently and a month ago  Patient had about 4-5 stools today which were loose  The C. difficile antigen is positive with C. difficile toxin being negative  At this time it was decided to be treated with oral vancomycin that was started on 10/17/2024  Appreciate ID consult and management    -Continue p.o. vancomycin 125 mg 4 times daily.  Infectious disease recommended total 2-week therapy.  - Given his stool burden in the colon will start patient on MiraLAX 17 g daily.  Hold MiraLAX if more than 3 bowel movements per day.       Intertrigo versus Candida infection in bilateral axilla and under the breast  Patient was noted to have erythematous rash which is pruritic in his bilateral axilla as well as underneath the breast.  In the right  axilla there were a few satellite lesions as well.  Clinically difficult to differentiate whether its intertrigo or Candida infection.  However often they coexist so we will treat both.  - Start topical hydrocortisone 2.5% twice daily for 1 week  - Start miconazole powder twice daily for 7 to 14 days  - Counseled patient about keeping the underarms and the skin folds underneath the breast dry and clean as much as possible     Type 2 diabetes mellitus  Will check sugars 4 times daily and give sliding scale insulin  Continue empagliflozin 10 mg daily     Suicidal ideation  Schizoaffective disorder  Borderline personality disorder  Depression  PTSD  TBI  Management as per psychiatry  Psychiatry is consulted     GERD  Will not treat GERD at this time with no symptoms     Hypertension  Continue amlodipine 5 mg daily but increase the dose to 10 mg daily as the blood pressure is still elevated at 144/100     Fracture base of right fifth metacarpal  Pain management oxycodone that the patient was taking before was restarted as he has throbbing pain in the right hand            Plan:   As per psychiatry patient has stabilized and sitter can be discontinued  Follow-up with [facility] to establish care with a new psychiatric medication provider on 10/29/24   Referral for individual therapy upon discharge as advised by psychiatry  Psychiatry does not feel that the patient needs to be an inpatient mental health stabilization unit -and once the  undertakes a coordinated discharge plan with [facility]  patient can discharge    Diet: Regular Diet Adult    DVT Prophylaxis: Enoxaparin (Lovenox) SQ  Styles Catheter: Not present  Code Status: Full Code         Medically Ready for Discharge: 2-4 days - when a Psychiatry  bed is found - otherwise under court commitment     Clinically Significant Risk Factors                   # Hypertension: Noted on problem list          # DMII: A1C = 7.4 % (Ref range:  "0.0 - 5.6 %) within past 6 months, PRESENT ON ADMISSION  # Obesity: Estimated body mass index is 35.51 kg/m  as calculated from the following:    Height as of this encounter: 1.676 m (5' 6\").    Weight as of this encounter: 99.8 kg (220 lb)., PRESENT ON ADMISSION       # Financial/Environmental Concerns: none               The patient's care was discussed with the Patient and RN.    Osbaldo Davis MD  Hospitalist Service  [facility]    ______________________________________________________________________    Interval History     Symptoms   Diarrhea is somewhat better with 3 stools today    Review of Systems:   Denies any nausea or vomiting  Rash in the armpits is improved not that much underneath the breast    Data reviewed today: I reviewed all medications, new labs and imaging results over the last 24 hours.     Physical Exam   Vital Signs: Temp: 97.3  F (36.3  C) Temp src: Temporal BP: (!) 141/87 Pulse: 94   Resp: 16 SpO2: 95 % O2 Device: None (Room air)    Weight: 220 lbs 0 oz    GENERAL: Patient does not look in any acute distress  HEENT: EOM+, Conjunctiva is clear   NECK:  no Jugular Venous distention  HEART: S1 S2 regular rate and rhythm  LUNGS: Respirations are  not laboured, Lungs are clear to auscultation without Crepitations or Wheezing   ABDOMEN: Soft, there is no tenderness,  Bowel Sounds are Positive   LOWER LIMBS: no Pedal Edema  Bilaterally right upper extremity in a dressing wrap  CNS:  Alert,  Oriented x 3, Moving all the Four Limb      Data   Recent Labs   Lab 10/21/24  1239 10/21/24  1154 10/21/24  0702 10/19/24  0712 10/19/24  0625 10/17/24  0720 10/17/24  0549   WBC  --   --   --   --   --   --  7.5   HGB  --   --   --   --   --   --  15.0   MCV  --   --   --   --   --   --  86   PLT  --   --   --   --  245  --  244   NA  --   --   --   --  138  --  139   POTASSIUM  --   --   --   --  4.1  --  4.3   CHLORIDE  --   --   --   --  102  --  104   CO2  --   --   --   --  23  -- "  18*   BUN  --   --   --   --  14.7  --  12.9   CR  --   --   --   --  0.71  --  0.66   ANIONGAP  --   --   --   --  13  --  17*   WILLIAMS  --   --   --   --  9.6  --  9.1   * 124* 166*   < > 129*   < > 185*    < > = values in this interval not displayed.         No results found for this or any previous visit (from the past 24 hour(s)).

## 2024-10-21 NOTE — CONSULTS
Psychiatry Consultation; Follow up              Reason for Consult, requesting source:    Follow-up     Requesting source: Osbaldo Davis    Labs and imaging reviewed, spoke with RN, Unit  and paged Dr. Davis.                Interim history:    Prakash Prasad is a 34 year old female to 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 adult with past  history notable or TBI, type 2 DM, urinary retention, neurogenic bladder, GERD, ADHD, bipolar 1 disorder, polysubstance use disorder, borderline personality disorder, and schizoaffective disorder who presented to the ED via EMS on 10/12/24 following an overdose on gabapentin and Klonopin. He reported he took these medications as he felt stressed in order to relax, he called 911 when he started to feel dizzy. At one point he also endorsed suicidal ideation yet denied that this was suicide attempt. Initial DEC assessment completed and recommended inpatient stabilization as patient is under MI commitment in [facility] but has been refusing medications, is not engaging in outpatient care, and appears to be decompensating.  While in the ED, Prakash became dysregulated when told he was not able to leave resulting in restraints. During initial psych consult on 10/14/24 patient reported he would continue to refuse medications and that he had stopped all psychiatric medications 1-2 weeks prior. Later that evening he and punched a wall resulting in a nondisplaced fracture of the base of the fifth metacarpal.  Patient is currently under MI committment in [facility] however no Ashley was in place. Petition for Ashley submitted on 10/14/24. Commitment  sent intent to revoke provisional discharge and [facility] Court hold has been received. Patient is now under a court hold.      Additionally, medical work-up concerning for UTI, kelfex started, however culture showed mixed elgin and keflex was discontinued. Patient reported having increasing loose stools. Patient's C.  "difficile is PCR positive and toxin negative; vancomycin started and enteric panel ordered for definitive diagnosis. Patient cannot transfer to  psych until free of diarrhea x 48 hours. Patient subsequently admitted to medical for additional monitoring. Psychiatry continuing to consult.        10/17/24: Prakash was sleeping when I went to meet with him. He woke up quickly when his name was called. He denies SI/HI and denies AH/VH. He tells me he feels better since coming to the hospital as his suicidal thoughts are gone. He is tired as he did not sleep much. He notes a fair appetite yet does have some stomach cramping.  I reviewed that he is currently on a court hold which he tells me he knows. Record review indicates he is not longer refusing his psychiatric medications, has been agreeable to taking them since 10/15. Lithium has not been reordered, he does not want a medication where he has to have blood draws. He tells me that he has decided he's going to take them now and feels \"more stable.\" He tells me ideally in the future he would like to simplify his medications and transition off of Abilify since he started Invega during his last inpatient stay. He has an appointment on 10/29 to establish care with a new psychiatrist. He appeared calm and cooperative today. Spoke with nursing, patient has been cooperative and compliant with medications.     Spoke with  and relayed patient is currently under commitment in [facility] and is on a court hold- currently cannot leave unless discharge is deemed appropriate and consultation with  occurs.     10/21/2024 Today, patient denies SI/HI and denies symptoms of psychosis. Patient is remorseful regarding actions leading to ED presentation. Patient reiterates overdose was an attempt to relax, not end their life. They feel better since resuming medications and report intent to continue taking medications. They tell me that their hand hurts and " that punching a wall was not the best decision. They have established outpatient supports and have been communicating with their - Wilbert. They do not have an outpatient therapist and are agreeable to being scheduled with someone. They have an appointment on 10/29/24 to establish care with a new psychiatric medication provider at [facility]. They would like to discharge today, they have a dog at home that they're looking forward to seeing. They live in an independent apartment that has [facility] staff and support available.         Current Medications:     Current Facility-Administered Medications   Medication Dose Route Frequency Provider Last Rate Last Admin    amLODIPine (NORVASC) tablet 10 mg  10 mg Oral Daily Osbaldo Davis MD   10 mg at 10/21/24 0758    ARIPiprazole (ABILIFY) tablet 10 mg  10 mg Oral Kwame Camarillo MD   10 mg at 10/21/24 0756    empagliflozin (JARDIANCE) tablet 10 mg  10 mg Oral Daily Kwame Encarnacion MD   10 mg at 10/21/24 0756    enoxaparin ANTICOAGULANT (LOVENOX) injection 40 mg  40 mg Subcutaneous Q24H Osbaldo Davis MD   40 mg at 10/20/24 2121    gabapentin (NEURONTIN) tablet 1,200 mg  1,200 mg Oral BID Osbaldo Davis MD   1,200 mg at 10/21/24 0757    hydrocortisone 2.5 % cream   Topical BID Chandni Davis MD   Given at 10/21/24 0806    insulin aspart (NovoLOG) injection (RAPID ACTING)  1-7 Units Subcutaneous TID AC Osbaldo Davis MD   1 Units at 10/21/24 0804    insulin aspart (NovoLOG) injection (RAPID ACTING)  1-5 Units Subcutaneous At Bedtime Osbaldo Davis MD        miconazole (MICATIN) 2 % powder   Topical BID Chandni Davis MD   Given at 10/21/24 0920    nicotine (NICODERM CQ) 14 MG/24HR 24 hr patch 1 patch  1 patch Transdermal Daily Hardik Doran DO   1 patch at 10/21/24 0800    paliperidone (INVEGA) 24 hr tablet 1.5 mg  1.5 mg Oral Kwame Camarillo MD   1.5 mg at 10/21/24 0758    paliperidone ER (INVEGA) 24 hr tablet 3 mg  3 mg Oral QAM  "Kwame Encarnacion MD   3 mg at 10/21/24 0759    PARoxetine (PAXIL) tablet 20 mg  20 mg Oral Daily Kwame Encarnacion MD   20 mg at 10/21/24 0757    polyethylene glycol (MIRALAX) Packet 17 g  17 g Oral Daily Chandni Davis MD   17 g at 10/21/24 0752    QUEtiapine (SEROquel) tablet 200 mg  200 mg Oral At Bedtime Tank Durbin MD   200 mg at 10/20/24 2121    rosuvastatin (CRESTOR) tablet 40 mg  40 mg Oral Daily Kwame Encarnacion MD   40 mg at 10/21/24 0755    testosterone (ANDROGEL/TESTIM) 50 MG/5GM (1%) topical gel 100 mg of testosterone  100 mg of testosterone Transdermal Daily Kwaem Encarnacion MD   100 mg of testosterone at 10/21/24 0917    vancomycin (VANCOCIN) capsule 125 mg  125 mg Oral 4x Daily Lamine Vieyra MD   125 mg at 10/21/24 0757              MSE:   Appearance: awake, alert, adequately groomed, appeared as age stated, and casually dressed  Attitude:  cooperative  Eye Contact:  good  Mood:  good  Affect:  mood congruent and intensity is blunted  Speech:  clear, coherent and normal prosody  Psychomotor Behavior:  no evidence of tardive dyskinesia, dystonia, or tics and intact station, gait and muscle tone  Thought Process:  logical and linear  Associations:  no loose associations  Thought Content:  no evidence of suicidal ideation or homicidal ideation and no evidence of psychotic thought  Insight:  fair  Judgement:  fair  Oriented to:  time, person, and place  Attention Span and Concentration:  intact  Recent and Remote Memory:  intact        Vital signs:  Temp: 97.3  F (36.3  C) Temp src: Temporal BP: (!) 141/87 Pulse: 94   Resp: 16 SpO2: 95 % O2 Device: None (Room air) Oxygen Delivery: 4 LPM Height: 167.6 cm (5' 6\") Weight: 99.8 kg (220 lb)  Estimated body mass index is 35.51 kg/m  as calculated from the following:    Height as of this encounter: 1.676 m (5' 6\").    Weight as of this encounter: 99.8 kg (220 lb).              DSM-5 Diagnosis:   295.70  (F25) Schizoaffective " Disorder Bipolar Type  Borderline Personality Disorder           Assessment:   Prakash Prasad is a 34 year old female to 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 adult with past  history notable or TBI, type 2 DM, urinary retention, neurogenic bladder, GERD, ADHD, bipolar 1 disorder, polysubstance use disorder, borderline personality disorder, and schizoaffective disorder who presented to the ED via EMS on 10/12/24 following an overdose on gabapentin and Klonopin. He reported he took these medications as he felt stressed in order to relax, he called 911 when he started to feel dizzy. At one point he also endorsed suicidal ideation yet denied that this was suicide attempt. Initial DEC assessment completed and recommended inpatient stabilization as patient is under MI commitment in [facility] but has been refusing medications, is not engaging in outpatient care, and appears to be decompensating.  While in the ED, Prakash became dysregulated when told he was not able to leave resulting in restraints. During initial psych consult on 10/14/24 patient reported he would continue to refuse medications and that he had stopped all psychiatric medications 1-2 weeks prior. Later that evening he and punched a wall resulting in a nondisplaced fracture of the base of the fifth metacarpal.  Patient is currently under MI committment in [facility] however no Ashley was in place. Petition for Ashley submitted on 10/14/24. Commitment  sent intent to revoke provisional discharge and [facility] Court hold has been received. Additionally, medical work-up concerning for UTI, kelfex started, however culture showed mixed elgin and keflex was discontinued. Patient reported having increasing loose stools. Patient's C. difficile is PCR positive and toxin negative; vancomycin started and enteric panel ordered for definitive diagnosis. Patient cannot transfer to  psych until free of diarrhea x 48 hours. Patient subsequently admitted to  medical for additional monitoring. Psychiatry continuing to consult.  Psychiatry has continued to follow the patient.  Patient has been calm, cooperative, and compliant with medications.  Today, patient denies SI/HI and there is no evidence of psychosis.  Patient expresses ongoing remorse and regret for actions leading up to hospital presentation.  He feels as though medication nonadherence was likely primary contributing factor to initial agitation and mood dysregulation which have improved since resuming medications last week.  Given improvements in affect stability and resolution of suicidal ideation, patient does appear to have returned to baseline and at time of assessment does not warrant transfer to inpatient mental health bed.  Patient has plans to establish psychiatric medication management with a new provider at [facility] on 10/29.  Patient has existing commitment , art therapist, and ICS support at his independent living.  He is agreeable to a referral for an individual therapist noting he perceives this to have been previously beneficial.  Discussion with unit  to coordinate with current commitment  for provisional discharge from the hospital once medically cleared.        Commitment  Jie David at [phone] D[employer]uan@"[employer]";  normally works Tu-Fri. Can coordinate with additional  Nadine Alberto [phone] if Jie is unavailable.           Summary of Recommendations:   Continue current medication regimen  Patient does appear to have stabilized, can discontinue sitter   Follow-up with [facility] to establish care with a new psychiatric medication provider on 10/29/24  Referral for individual therapy upon discharge- see AVS for appointment details   At time of assessment patient no longer appears to warrant inpatient mental health stabilization. Discussed with unit  who is assisting with coordination of a provisional  "discharge with [facility] - once arranged patient is able to discharge once medically cleared.      BROOKLYN [facility]  Consult/Liaison Psychiatry   [facility]    Please call the [facility] CL line ([phone]) with questions and to determine consult service coverage.   \"Much or all of the text in this note was generated through the use of Dragon Dictate voice to text software. Errors in spelling or words which appear to be out of contact are unintentional, may be present due having escaped editing\"          "

## 2024-10-21 NOTE — PLAN OF CARE
"Goal Outcome Evaluation:      Plan of Care Reviewed With: patient    Overall Patient Progress: no changeOverall Patient Progress: no change    Outcome Evaluation: Pt would prefer female PSCs. Discharge TBD      Problem: Adult Inpatient Plan of Care  Goal: Plan of Care Review  Description: The Plan of Care Review/Shift note should be completed every shift.  The Outcome Evaluation is a brief statement about your assessment that the patient is improving, declining, or no change.  This information will be displayed automatically on your shift  note.  Outcome: Progressing  Flowsheets (Taken 10/21/2024 0518)  Outcome Evaluation: Pt would prefer female PSCs. Discharge TBD  Plan of Care Reviewed With: patient  Overall Patient Progress: no change  Goal: Patient-Specific Goal (Individualized)  Description: You can add care plan individualizations to a care plan. Examples of Individualization might be:  \"Parent requests to be called daily at 9am for status\", \"I have a hard time hearing out of my right ear\", or \"Do not touch me to wake me up as it startles  me\".  Outcome: Progressing  Goal: Absence of Hospital-Acquired Illness or Injury  Outcome: Progressing  Intervention: Identify and Manage Fall Risk  Recent Flowsheet Documentation  Taken 10/21/2024 0000 by Brisa Atwood RN  Safety Promotion/Fall Prevention:   clutter free environment maintained   safety round/check completed   room near nurse's station  Intervention: Prevent Skin Injury  Recent Flowsheet Documentation  Taken 10/21/2024 0000 by Brisa Atwood RN  Skin Protection: adhesive use limited  Device Skin Pressure Protection: adhesive use limited  Intervention: Prevent and Manage VTE (Venous Thromboembolism) Risk  Recent Flowsheet Documentation  Taken 10/21/2024 0000 by Brisa Atwood RN  VTE Prevention/Management: SCDs off (sequential compression devices)  Intervention: Prevent Infection  Recent Flowsheet Documentation  Taken 10/21/2024 " 0000 by Brisa Atwood RN  Infection Prevention:   single patient room provided   rest/sleep promoted   hand hygiene promoted  Goal: Optimal Comfort and Wellbeing  Outcome: Progressing  Goal: Readiness for Transition of Care  Outcome: Progressing     Problem: Seclusion/Restraint, Behavioral  Goal: Seclusion/Behavioral Restraint Goal: Absence of Harm or Injury  Outcome: Progressing  Intervention: Protect Skin and Joint Integrity  Recent Flowsheet Documentation  Taken 10/21/2024 0000 by Brisa Atwood RN  Skin Protection: adhesive use limited  Range of Motion: active ROM (range of motion) encouraged     Problem: Suicide Risk  Goal: Absence of Self-Harm  Outcome: Progressing  Intervention: Assess Risk to Self and Maintain Safety  Recent Flowsheet Documentation  Taken 10/21/2024 0000 by Brisa Atwood RN  Enhanced Safety Measures:   pain management   room near unit station    at bedside  Intervention: Promote Psychosocial Wellbeing  Recent Flowsheet Documentation  Taken 10/21/2024 0000 by Brisa Atwood RN  Supportive Measures:   active listening utilized   positive reinforcement provided   problem-solving facilitated   relaxation techniques promoted   verbalization of feelings encouraged     Problem: Pain Acute  Goal: Optimal Pain Control and Function  Outcome: Progressing  Intervention: Prevent or Manage Pain  Recent Flowsheet Documentation  Taken 10/21/2024 0000 by Brisa Atwood RN  Medication Review/Management: medications reviewed  Intervention: Optimize Psychosocial Wellbeing  Recent Flowsheet Documentation  Taken 10/21/2024 0000 by Brisa Atwood RN  Supportive Measures:   active listening utilized   positive reinforcement provided   problem-solving facilitated   relaxation techniques promoted   verbalization of feelings encouraged     Problem: Diarrhea  Goal: Effective Diarrhea Management  Outcome: Progressing  Intervention: Manage  Diarrhea  Recent Flowsheet Documentation  Taken 10/21/2024 0000 by Brisa Atwood, RN  Isolation Precautions: enteric precautions maintained  Medication Review/Management: medications reviewed

## 2024-10-22 VITALS
DIASTOLIC BLOOD PRESSURE: 77 MMHG | SYSTOLIC BLOOD PRESSURE: 120 MMHG | WEIGHT: 220 LBS | RESPIRATION RATE: 18 BRPM | BODY MASS INDEX: 35.36 KG/M2 | OXYGEN SATURATION: 97 % | HEART RATE: 94 BPM | TEMPERATURE: 98.5 F | HEIGHT: 66 IN

## 2024-10-22 LAB
CREAT SERPL-MCNC: 0.64 MG/DL (ref 0.51–1.17)
EGFRCR SERPLBLD CKD-EPI 2021: >90 ML/MIN/1.73M2
GLUCOSE BLDC GLUCOMTR-MCNC: 128 MG/DL (ref 70–99)
GLUCOSE BLDC GLUCOMTR-MCNC: 132 MG/DL (ref 70–99)
GLUCOSE BLDC GLUCOMTR-MCNC: 177 MG/DL (ref 70–99)
PLATELET # BLD AUTO: 251 10E3/UL (ref 150–450)

## 2024-10-22 PROCEDURE — 36415 COLL VENOUS BLD VENIPUNCTURE: CPT | Performed by: INTERNAL MEDICINE

## 2024-10-22 PROCEDURE — 99239 HOSP IP/OBS DSCHRG MGMT >30: CPT | Performed by: STUDENT IN AN ORGANIZED HEALTH CARE EDUCATION/TRAINING PROGRAM

## 2024-10-22 PROCEDURE — A9270 NON-COVERED ITEM OR SERVICE: HCPCS | Performed by: EMERGENCY MEDICINE

## 2024-10-22 PROCEDURE — 99232 SBSQ HOSP IP/OBS MODERATE 35: CPT | Performed by: INTERNAL MEDICINE

## 2024-10-22 PROCEDURE — 250N000013 HC RX MED GY IP 250 OP 250 PS 637: Performed by: EMERGENCY MEDICINE

## 2024-10-22 PROCEDURE — 250N000013 HC RX MED GY IP 250 OP 250 PS 637: Performed by: HOSPITALIST

## 2024-10-22 PROCEDURE — 250N000013 HC RX MED GY IP 250 OP 250 PS 637: Performed by: INTERNAL MEDICINE

## 2024-10-22 PROCEDURE — 85049 AUTOMATED PLATELET COUNT: CPT | Performed by: INTERNAL MEDICINE

## 2024-10-22 PROCEDURE — 82565 ASSAY OF CREATININE: CPT | Performed by: INTERNAL MEDICINE

## 2024-10-22 RX ORDER — GABAPENTIN 600 MG/1
TABLET ORAL
Qty: 120 TABLET | Refills: 1 | Status: SHIPPED | OUTPATIENT
Start: 2024-10-22

## 2024-10-22 RX ORDER — VALACYCLOVIR HYDROCHLORIDE 1 G/1
2000 TABLET, FILM COATED ORAL 2 TIMES DAILY PRN
Status: SHIPPED
Start: 2024-10-22

## 2024-10-22 RX ORDER — VANCOMYCIN HYDROCHLORIDE 125 MG/1
125 CAPSULE ORAL 4 TIMES DAILY
Qty: 28 CAPSULE | Refills: 0 | Status: SHIPPED | OUTPATIENT
Start: 2024-10-22 | End: 2024-11-12

## 2024-10-22 RX ORDER — CLONAZEPAM 0.5 MG/1
0.5 TABLET ORAL 2 TIMES DAILY PRN
Qty: 30 TABLET | Refills: 0 | Status: SHIPPED | OUTPATIENT
Start: 2024-10-22

## 2024-10-22 RX ORDER — PALIPERIDONE 3 MG/1
3 TABLET, EXTENDED RELEASE ORAL EVERY MORNING
Qty: 3 TABLET | Refills: 2 | Status: SHIPPED | OUTPATIENT
Start: 2024-10-22

## 2024-10-22 RX ORDER — CLONAZEPAM 0.5 MG/1
0.5 TABLET ORAL 2 TIMES DAILY PRN
Qty: 30 TABLET | Refills: 0 | Status: SHIPPED | OUTPATIENT
Start: 2024-10-22 | End: 2024-10-22

## 2024-10-22 RX ORDER — PALIPERIDONE 1.5 MG/1
1.5 TABLET, EXTENDED RELEASE ORAL EVERY MORNING
Qty: 30 TABLET | Refills: 2 | Status: SHIPPED | OUTPATIENT
Start: 2024-10-22

## 2024-10-22 RX ORDER — PAROXETINE 20 MG/1
20 TABLET, FILM COATED ORAL DAILY
Qty: 90 TABLET | Refills: 0 | Status: SHIPPED | OUTPATIENT
Start: 2024-10-22

## 2024-10-22 RX ORDER — ARIPIPRAZOLE 10 MG/1
10 TABLET ORAL EVERY MORNING
Qty: 90 TABLET | Refills: 0 | Status: SHIPPED | OUTPATIENT
Start: 2024-10-22

## 2024-10-22 RX ORDER — QUETIAPINE FUMARATE 200 MG/1
200 TABLET, FILM COATED ORAL AT BEDTIME
Qty: 30 TABLET | Refills: 2 | Status: SHIPPED | OUTPATIENT
Start: 2024-10-22 | End: 2024-11-06

## 2024-10-22 RX ADMIN — VANCOMYCIN HYDROCHLORIDE 125 MG: 125 CAPSULE ORAL at 11:08

## 2024-10-22 RX ADMIN — EMPAGLIFLOZIN 10 MG: 10 TABLET, FILM COATED ORAL at 08:16

## 2024-10-22 RX ADMIN — GABAPENTIN 1200 MG: 600 TABLET, FILM COATED ORAL at 08:11

## 2024-10-22 RX ADMIN — OXYCODONE HYDROCHLORIDE 5 MG: 5 TABLET ORAL at 05:15

## 2024-10-22 RX ADMIN — NICOTINE POLACRILEX 4 MG: 2 GUM, CHEWING ORAL at 16:46

## 2024-10-22 RX ADMIN — AMLODIPINE BESYLATE 10 MG: 10 TABLET ORAL at 08:11

## 2024-10-22 RX ADMIN — ARIPIPRAZOLE 10 MG: 10 TABLET ORAL at 08:11

## 2024-10-22 RX ADMIN — CYCLOBENZAPRINE 10 MG: 10 TABLET, FILM COATED ORAL at 10:34

## 2024-10-22 RX ADMIN — CLONAZEPAM 0.5 MG: 0.5 TABLET ORAL at 12:29

## 2024-10-22 RX ADMIN — ROSUVASTATIN CALCIUM 40 MG: 20 TABLET, FILM COATED ORAL at 08:11

## 2024-10-22 RX ADMIN — NICOTINE POLACRILEX 4 MG: 2 GUM, CHEWING ORAL at 08:12

## 2024-10-22 RX ADMIN — PALIPERIDONE 1.5 MG: 1.5 TABLET, EXTENDED RELEASE ORAL at 08:13

## 2024-10-22 RX ADMIN — NICOTINE 1 PATCH: 14 PATCH, EXTENDED RELEASE TRANSDERMAL at 08:11

## 2024-10-22 RX ADMIN — PAROXETINE HYDROCHLORIDE 20 MG: 20 TABLET, FILM COATED ORAL at 08:11

## 2024-10-22 RX ADMIN — OXYCODONE HYDROCHLORIDE 5 MG: 5 TABLET ORAL at 11:09

## 2024-10-22 RX ADMIN — TESTOSTERONE 100 MG OF TESTOSTERONE: 50 GEL TRANSDERMAL at 08:11

## 2024-10-22 RX ADMIN — NICOTINE POLACRILEX 4 MG: 2 GUM, CHEWING ORAL at 10:36

## 2024-10-22 RX ADMIN — VANCOMYCIN HYDROCHLORIDE 125 MG: 125 CAPSULE ORAL at 08:11

## 2024-10-22 RX ADMIN — VANCOMYCIN HYDROCHLORIDE 125 MG: 125 CAPSULE ORAL at 17:32

## 2024-10-22 RX ADMIN — NICOTINE POLACRILEX 4 MG: 2 GUM, CHEWING ORAL at 13:39

## 2024-10-22 RX ADMIN — PALIPERIDONE 3 MG: 3 TABLET, EXTENDED RELEASE ORAL at 08:13

## 2024-10-22 RX ADMIN — NICOTINE POLACRILEX 4 MG: 2 GUM, CHEWING ORAL at 05:28

## 2024-10-22 ASSESSMENT — ACTIVITIES OF DAILY LIVING (ADL)
ADLS_ACUITY_SCORE: 25

## 2024-10-22 NOTE — DISCHARGE SUMMARY
"[facility]  Hospitalist Discharge Summary      Date of Admission:  10/12/2024  Date of Discharge:  10/22/2024  Discharging Provider: Dieudonne Conroy MD  Discharge Service: Hospitalist Service    Discharge Diagnoses     Acute diarrhea, likely secondary to C. difficile.  Intertrigo of bilateral axilla and inframammary region.  Diabetes mellitus type 2..  Schizoaffective disorder with borderline personality disorder..  Depression.  Suicidal attempt.  PTSD.  TBI.  Hypertension.  Fracture base of right fifth metacarpal.      Clinically Significant Risk Factors     # DMII: A1C = 7.4 % (Ref range: 0.0 - 5.6 %) within past 6 months  # Obesity: Estimated body mass index is 35.51 kg/m  as calculated from the following:    Height as of this encounter: 1.676 m (5' 6\").    Weight as of this encounter: 99.8 kg (220 lb).       Follow-ups Needed After Discharge   Follow-up Appointments     Follow-up and recommended labs and tests       Follow up with primary care provider, Becki Avery, within 7 days for   hospital follow- up.  No follow up labs or test are needed.      Keep your appointment on 10/29/24 to establish care with a new psychiatric   medication provider at [facility]            Discharge Disposition   Discharged to home  Condition at discharge: Stable    Hospital Course   34-year-old adult with ADHD, bipolar disorder, schizoaffective disorder, borderline personality disorder, depression, type 2 diabetes mellitus, GERD, history of TBI, hypertension, obesity, substance abuse with methamphetamine and heroin but now in remission except for medical marijuana and PTSD who was admitted on 10/12/2024 after drug overdose with 20 pills of 40 mg gabapentin and 10 Klonopin in a suicidal attempt.    Patient was awaiting transfer to psych unit when he developed diarrhea.  He tested positive for C. difficile antigen but negative for C. difficile toxin.  Because of symptoms he was started on vancomycin and ID agreed to " finish 2-week course of vancomycin.    Patient's mental health symptoms later improved and psychiatry cleared him for discharge home.  He needed new prescriptions for all of his psych medications.  He will follow-up outpatient with psychiatry.    Patient has fracture of the base of right fifth metacarpal and can follow-up with orthopedics outpatient.  His arm was in a sling to keep his hand elevated.    Consultations This Hospital Stay   DIAGNOSTIC EVALUATION CENTER (DEC) ASSESSMENT ORDER  DIAGNOSTIC EVALUATION CENTER (DEC) ASSESSMENT ORDER  MEDICATION HISTORY IP PHARMACY CONSULT  MEDICATION HISTORY IP PHARMACY CONSULT  PSYCHIATRY IP CONSULT  PSYCHIATRY IP CONSULT  PSYCHIATRY IP CONSULT  PSYCHIATRY IP CONSULT  INFECTIOUS DISEASES IP CONSULT  CARE MANAGEMENT / SOCIAL WORK IP CONSULT  ORTHOPEDIC SURGERY IP CONSULT  OCCUPATIONAL THERAPY ADULT IP CONSULT  PSYCHIATRY IP CONSULT    Code Status   Full Code    Time Spent on this Encounter   I, Dieudonne Conroy MD, personally saw the patient today and spent greater than 30 minutes discharging this patient.       Dieudonne Conroy MD  [facility]  [address] [place] [address]  Phone: [phone]  Fax: [phone]  ______________________________________________________________________    Physical Exam   Vital Signs: Temp: 98.2  F (36.8  C) Temp src: Temporal BP: (!) 142/90 Pulse: 93   Resp: 18 SpO2: 98 % O2 Device: None (Room air)    Weight: 220 lbs 0 oz  General Appearance: Alert awake and oriented x 3  Respiratory: Clear to auscultation  Cardiovascular: S1-S2 normal  GI: Soft and nontender  Skin: No rash  Other: Right arm in a sling.         Primary Care Physician   Becki Avery    Discharge Orders      Medication Therapy Management Referral      Primary Care - Care Coordination Referral      Reason for your hospital stay    C. difficile colitis.     Follow-up and recommended labs and tests     Follow up with primary care provider,  Becki Avery, within 7 days for hospital follow- up.  No follow up labs or test are needed.      Keep your appointment on 10/29/24 to establish care with a new psychiatric medication provider at [facility]     Activity    Your activity upon discharge: activity as tolerated     Diet    Follow this diet upon discharge: Current Diet:Orders Placed This Encounter      Regular Diet Adult       Significant Results and Procedures   Most Recent 3 CBC's:  Recent Labs   Lab Test 10/22/24  0622 10/19/24  0625 10/17/24  0549 10/12/24  2056 09/26/24  0814   WBC  --   --  7.5 14.6* 9.7   HGB  --   --  15.0 15.9 14.1   MCV  --   --  86 84 87    245 244 295 239     Most Recent 3 BMP's:  Recent Labs   Lab Test 10/22/24  1210 10/22/24  0730 10/22/24  0622 10/22/24  0140 10/19/24  0712 10/19/24  0625 10/17/24  0720 10/17/24  0549 10/15/24  1124 10/12/24  2055   NA  --   --   --   --   --  138  --  139  --  137   POTASSIUM  --   --   --   --   --  4.1  --  4.3  --  3.8   CHLORIDE  --   --   --   --   --  102  --  104  --  100   CO2  --   --   --   --   --  23  --  18*  --  22   BUN  --   --   --   --   --  14.7  --  12.9  --  11.1   CR  --   --  0.64  --   --  0.71  --  0.66  --  0.76   ANIONGAP  --   --   --   --   --  13  --  17*  --  15   WILLIAMS  --   --   --   --   --  9.6  --  9.1  --  9.9   * 177*  --  132*   < > 129*   < > 185*   < > 141*    < > = values in this interval not displayed.     Most Recent 2 LFT's:  Recent Labs   Lab Test 10/12/24  2055 09/23/24  0046   AST 52* 38   ALT 52 45   ALKPHOS 88 80   BILITOTAL 0.3 0.2   ,   Results for orders placed or performed during the hospital encounter of 10/12/24   XR Hand Right G/E 3 Views    Narrative    EXAM: XR HAND RIGHT G/E 3 VIEWS  LOCATION: [facility]  DATE: 10/14/2024    INDICATION: Punched wall, swelling  COMPARISON: None.      Impression    IMPRESSION: Nondisplaced fracture of the base of the fifth metacarpal. Significant surrounding soft  tissue swelling. Right hand otherwise negative.   CT Abdomen Pelvis w Contrast    Narrative    EXAM: CT ABDOMEN PELVIS W CONTRAST  LOCATION: [facility]  DATE: 10/19/2024    INDICATION: Ongoing abdominal pain and diarrhea  COMPARISON: 9/22/2024  TECHNIQUE: CT scan of the abdomen and pelvis was performed following injection of IV contrast. Multiplanar reformats were obtained. Dose reduction techniques were used.  CONTRAST: 100 mL Isovue 370    FINDINGS:   LOWER CHEST: Normal.    HEPATOBILIARY: Cholecystectomy.    PANCREAS: Normal.    SPLEEN: Normal.    ADRENAL GLANDS: Normal.    KIDNEYS/BLADDER: 2 mm stone upper pole left kidney. 2 mm stone lower pole right kidney. No ureteral stone or hydronephrosis. Normal bladder.    BOWEL: No obstruction or inflammatory change. Formed stool throughout all of the colon, no air-fluid levels. No inflammatory focus. Appendix normal.    LYMPH NODES: No significant adenopathy.    VASCULATURE: Normal.    PELVIC ORGANS: [Normal in size, no adnexal lesions or free fluid.    MUSCULOSKELETAL: Previous decompression laminectomy procedure at L5.      Impression    IMPRESSION:   1.  No etiology for symptoms evident. Nothing obstructive or inflammatory involving bowel. Stool seen throughout the colon appears to be formed, no air-fluid levels.  2.  Tiny nonobstructing bilateral renal calculi unchanged.     *Note: Due to a large number of results and/or encounters for the requested time period, some results have not been displayed. A complete set of results can be found in Results Review.       Discharge Medications   Current Discharge Medication List        START taking these medications    Details   vancomycin (VANCOCIN) 125 MG capsule Take 1 capsule (125 mg) by mouth 4 times daily.  Qty: 28 capsule, Refills: 0    Associated Diagnoses: C. difficile colitis           CONTINUE these medications which have CHANGED    Details   ARIPiprazole (ABILIFY) 10 MG tablet Take 1 tablet  (10 mg) by mouth every morning.  Qty: 90 tablet, Refills: 0    Associated Diagnoses: Major depressive disorder, recurrent severe without psychotic features (H)      clonazePAM (KLONOPIN) 0.5 MG tablet Take 1 tablet (0.5 mg) by mouth 2 times daily as needed for anxiety.  Qty: 30 tablet, Refills: 0    Associated Diagnoses: Anxiety      gabapentin (NEURONTIN) 600 MG tablet Take 2 tablets (1200 mg) morning and evening, 1 tablet mid-day (600 mg)  Qty: 120 tablet, Refills: 1    Associated Diagnoses: AVA (generalized anxiety disorder)      !! paliperidone (INVEGA) 1.5 MG 24 hr tablet Take 1 tablet (1.5 mg) by mouth every morning. Take with 3mg tablet for total dose of 4.5mg  Qty: 30 tablet, Refills: 2    Associated Diagnoses: Schizoaffective disorder, bipolar type (H)      !! paliperidone ER (INVEGA) 3 MG 24 hr tablet Take 1 tablet (3 mg) by mouth every morning. Take with 1.5 mg tablet for total dose of 4.5mg  Qty: 3 tablet, Refills: 2    Associated Diagnoses: Schizoaffective disorder, bipolar type (H)      PARoxetine (PAXIL) 20 MG tablet Take 1 tablet (20 mg) by mouth daily.  Qty: 90 tablet, Refills: 0    Associated Diagnoses: Major depressive disorder, recurrent severe without psychotic features (H)      QUEtiapine (SEROQUEL) 200 MG tablet Take 1 tablet (200 mg) by mouth at bedtime.  Qty: 30 tablet, Refills: 2    Associated Diagnoses: Mood disorder (H)      valACYclovir (VALTREX) 1000 mg tablet Take 2 tablets (2,000 mg) by mouth 2 times daily as needed.    Associated Diagnoses: Herpes labialis       !! - Potential duplicate medications found. Please discuss with provider.        CONTINUE these medications which have NOT CHANGED    Details   acetaminophen (TYLENOL) 325 MG tablet Take 325-650 mg by mouth every 6 hours as needed for mild pain.      amLODIPine (NORVASC) 5 MG tablet Take 1 tablet (5 mg) by mouth daily.  Qty: 90 tablet, Refills: 0    Associated Diagnoses: Hypertension, unspecified type      clindamycin  phos-benzoyl perox (DUAC) 1.2-5 % external gel Apply topically daily.  Qty: 45 g, Refills: 11    Associated Diagnoses: Acne vulgaris      cyclobenzaprine (FLEXERIL) 10 MG tablet Take 1 tablet (10 mg) by mouth every 8 hours as needed for muscle spasms.  Qty: 30 tablet, Refills: 0    Associated Diagnoses: Flank pain      empagliflozin (JARDIANCE) 10 MG TABS tablet Take 1 tablet (10 mg) by mouth daily  Qty: 90 tablet, Refills: 0    Associated Diagnoses: Type 2 diabetes mellitus with hyperglycemia, without long-term current use of insulin (H)      naloxone (NARCAN) 4 MG/0.1ML nasal spray Spray 1 spray (4 mg) into one nostril alternating nostrils as needed for opioid reversal. every 2-3 minutes until assistance arrives  Qty: 2 each, Refills: 1    Associated Diagnoses: Bilateral kidney stones      ondansetron (ZOFRAN ODT) 4 MG ODT tab Take 1 tablet (4 mg) by mouth every 8 hours as needed for nausea.  Qty: 30 tablet, Refills: 0    Associated Diagnoses: Viral gastroenteritis      rosuvastatin (CRESTOR) 40 MG tablet Take 1 tablet (40 mg) by mouth daily  Qty: 30 tablet, Refills: 2    Associated Diagnoses: Hyperlipidemia LDL goal <100      Semaglutide (RYBELSUS) 7 MG tablet Take 1 tablet (7 mg) by mouth daily.  Qty: 90 tablet, Refills: 0    Associated Diagnoses: Type 2 diabetes mellitus with hyperglycemia, with long-term current use of insulin (H)      testosterone (ANDROGEL/TESTIM) 50 MG/5GM (1%) topical gel Place 2 packets (100 mg of testosterone) onto the skin daily  Qty: 30 packet, Refills: 2    Associated Diagnoses: Gender incongruence      traZODone (DESYREL) 50 MG tablet Take 1 tablet (50 mg) by mouth nightly as needed for sleep.  Qty: 60 tablet, Refills: 0    Associated Diagnoses: Persistent insomnia      ACE/ARB/ARNI NOT PRESCRIBED (INTENTIONAL) Please choose reason not prescribed from choices below.    Associated Diagnoses: Type 2 diabetes mellitus with hyperglycemia, without long-term current use of insulin (H)       blood glucose (NO BRAND SPECIFIED) test strip Use to test blood sugar one times daily and as needed. To accompany: Blood Glucose Monitor Brands: per insurance.  Qty: 100 strip, Refills: 3    Associated Diagnoses: Type 2 diabetes mellitus with hyperglycemia, without long-term current use of insulin (H)      insulin pen needle (32G X 4 MM) 32G X 4 MM miscellaneous Use 1 pen needles daily or as directed.  Qty: 100 each, Refills: 2    Associated Diagnoses: Type 2 diabetes mellitus with hyperglycemia, without long-term current use of insulin (H)      thin (NO BRAND SPECIFIED) lancets Use with lanceting device to check blood sugars once per day. To accompany: Blood Glucose Monitor Brands: per insurance.  Qty: 100 each, Refills: 2    Associated Diagnoses: Type 2 diabetes mellitus with hyperglycemia, without long-term current use of insulin (H)           STOP taking these medications       oxyCODONE (ROXICODONE) 5 MG tablet Comments:   Reason for Stopping:         oxyCODONE (ROXICODONE) 5 MG tablet Comments:   Reason for Stopping:         oxyCODONE (ROXICODONE) 5 MG tablet Comments:   Reason for Stopping:             Allergies   Allergies   Allergen Reactions    Haldol [Haloperidol] Other (See Comments)     Makes patient very angry and anxious    Adhesive Tape Hives    Prednisone Other (See Comments) and Hives     Suicidal ideation    Droperidol Anxiety

## 2024-10-22 NOTE — PLAN OF CARE
"Goal Outcome Evaluation:      Plan of Care Reviewed With: patient    Overall Patient Progress: improvingOverall Patient Progress: improving    Outcome Evaluation: Pt is independent with transfers. reported pain to R hand, prn Oxycodone given, effective, VSS on RA. Pt is calm, cooperative with cares. no loose stool during night. Plan to d/c home today.      Problem: Adult Inpatient Plan of Care  Goal: Plan of Care Review  Description: The Plan of Care Review/Shift note should be completed every shift.  The Outcome Evaluation is a brief statement about your assessment that the patient is improving, declining, or no change.  This information will be displayed automatically on your shift  note.  Outcome: Progressing  Flowsheets (Taken 10/22/2024 0603)  Outcome Evaluation: Pt is independent with transfers. reported pain to R hand, prn Oxycodone given, effective, VSS on RA. Pt is calm, cooperative with cares. no loose stool during night. Plan to d/c home today.  Plan of Care Reviewed With: patient  Overall Patient Progress: improving  Goal: Patient-Specific Goal (Individualized)  Description: You can add care plan individualizations to a care plan. Examples of Individualization might be:  \"Parent requests to be called daily at 9am for status\", \"I have a hard time hearing out of my right ear\", or \"Do not touch me to wake me up as it startles  me\".  Outcome: Progressing  Goal: Absence of Hospital-Acquired Illness or Injury  Outcome: Progressing  Intervention: Identify and Manage Fall Risk  Recent Flowsheet Documentation  Taken 10/22/2024 0200 by Esme Faust RN  Safety Promotion/Fall Prevention:   safety round/check completed   lighting adjusted   clutter free environment maintained  Intervention: Prevent and Manage VTE (Venous Thromboembolism) Risk  Recent Flowsheet Documentation  Taken 10/22/2024 0200 by Esme Faust RN  VTE Prevention/Management: SCDs off (sequential compression devices)  Intervention: Prevent " Infection  Recent Flowsheet Documentation  Taken 10/22/2024 0200 by Esme Faust RN  Infection Prevention: single patient room provided  Goal: Optimal Comfort and Wellbeing  Outcome: Progressing  Goal: Readiness for Transition of Care  Outcome: Progressing     Problem: Seclusion/Restraint, Behavioral  Goal: Seclusion/Behavioral Restraint Goal: Absence of Harm or Injury  Outcome: Progressing     Problem: Suicide Risk  Goal: Absence of Self-Harm  Outcome: Progressing  Intervention: Assess Risk to Self and Maintain Safety  Recent Flowsheet Documentation  Taken 10/22/2024 0200 by Esme Faust RN  Enhanced Safety Measures: pain management     Problem: Pain Acute  Goal: Optimal Pain Control and Function  Outcome: Progressing  Intervention: Prevent or Manage Pain  Recent Flowsheet Documentation  Taken 10/22/2024 0200 by Esme Faust RN  Medication Review/Management: medications reviewed     Problem: Diarrhea  Goal: Effective Diarrhea Management  Outcome: Progressing  Intervention: Manage Diarrhea  Recent Flowsheet Documentation  Taken 10/22/2024 0200 by Esme Faust RN  Isolation Precautions: enteric precautions maintained  Medication Review/Management: medications reviewed

## 2024-10-22 NOTE — PROGRESS NOTES
[facility]  Infectious Disease Progress Note          Assessment and Plan:   Date of Admission:  10/12/2024  Date of Consult (When I saw the patient): 10/17/24        Assessment & Plan  Prakash Prasad is a 34 year old adult who was admitted on 10/12/2024.      Impression: 1 34-year-old male, housed in ER for several days awaiting psychiatric disposition with multiple underlying psychiatric issues  2 mildly abnormal urinalysis given Keflex, urine culture eventually unremarkable and not obvious true UTI  3 acute diarrhea, multiple loose stools continuing, crampy abdominal pain, C. difficile PCR positive but toxin negative implying colonization, also of interest #4 below, however hard to exclude that this is true C. difficile colitis from the current antibiotic course  4 prior C. difficile colitis in 2018 until 2020, even then a typical pattern a typical response to treatment, consideration was colonization rather than true infection and then eventually resolved and does not have ongoing chronic diarrhea     REC 1 clear improvement in both crampy abdominal pain and loose stools, appears to be responding, still not entirely clear this is true C. difficile colitis but treat accordingly, complete a full 2-week course of 125 4 times daily of vancomycin  Disposition soon, I will follow peripherally           Interval History:     no new complaints , formed stools, crampy abdominal pain better no major fever or systemic symptoms no new positive studies CT scan without any obvious issue including no major colitis              Medications:     Current Facility-Administered Medications   Medication Dose Route Frequency Provider Last Rate Last Admin    amLODIPine (NORVASC) tablet 10 mg  10 mg Oral Daily Osbaldo Davis MD   10 mg at 10/22/24 0811    ARIPiprazole (ABILIFY) tablet 10 mg  10 mg Oral Kwame Camarillo MD   10 mg at 10/22/24 0811    empagliflozin (JARDIANCE) tablet 10 mg  10 mg Oral  Daily Kwame Encarnacion MD   10 mg at 10/22/24 0816    enoxaparin ANTICOAGULANT (LOVENOX) injection 40 mg  40 mg Subcutaneous Q24H Osbaldo Davis MD   40 mg at 10/21/24 2150    gabapentin (NEURONTIN) tablet 1,200 mg  1,200 mg Oral BID Osbaldo Davis MD   1,200 mg at 10/22/24 0811    hydrocortisone 2.5 % cream   Topical BID Chandni Davis MD   Given at 10/21/24 1901    insulin aspart (NovoLOG) injection (RAPID ACTING)  1-7 Units Subcutaneous TID AC Osbaldo Davis MD   1 Units at 10/22/24 0812    insulin aspart (NovoLOG) injection (RAPID ACTING)  1-5 Units Subcutaneous At Bedtime Osbaldo Davis MD   1 Units at 10/21/24 2157    miconazole (MICATIN) 2 % powder   Topical BID Chandni Davis MD   Given at 10/21/24 2153    nicotine (NICODERM CQ) 14 MG/24HR 24 hr patch 1 patch  1 patch Transdermal Daily Hardik Doran DO   1 patch at 10/22/24 0811    paliperidone (INVEGA) 24 hr tablet 1.5 mg  1.5 mg Oral QAM Kwame Encarnacion MD   1.5 mg at 10/22/24 0813    paliperidone ER (INVEGA) 24 hr tablet 3 mg  3 mg Oral QAM Kwame Encarnacion MD   3 mg at 10/22/24 0813    PARoxetine (PAXIL) tablet 20 mg  20 mg Oral Daily Kwame Encarnacion MD   20 mg at 10/22/24 0811    polyethylene glycol (MIRALAX) Packet 17 g  17 g Oral Daily Chandni Davis MD   17 g at 10/21/24 0752    QUEtiapine (SEROquel) tablet 200 mg  200 mg Oral At Bedtime Tank Durbin MD   200 mg at 10/21/24 2148    rosuvastatin (CRESTOR) tablet 40 mg  40 mg Oral Daily Kwame Encarnacion MD   40 mg at 10/22/24 0811    testosterone (ANDROGEL/TESTIM) 50 MG/5GM (1%) topical gel 100 mg of testosterone  100 mg of testosterone Transdermal Daily Kwame Encarnacion MD   100 mg of testosterone at 10/22/24 0811    vancomycin (VANCOCIN) capsule 125 mg  125 mg Oral 4x Daily Lamine Vieyra MD   125 mg at 10/22/24 1108                  Physical Exam:   Blood pressure (!) 142/90, pulse 93, temperature 98.2  F (36.8  C), temperature source Temporal, resp. rate  "18, height 1.676 m (5' 6\"), weight 99.8 kg (220 lb), SpO2 98%, not currently breastfeeding.  Wt Readings from Last 2 Encounters:   10/16/24 99.8 kg (220 lb)   10/02/24 105.9 kg (233 lb 6.4 oz)     Vital Signs with Ranges  Temp:  [97.2  F (36.2  C)-98.2  F (36.8  C)] 98.2  F (36.8  C)  Pulse:  [] 93  Resp:  [16-18] 18  BP: (101-142)/(56-90) 142/90  SpO2:  [92 %-98 %] 98 %    Constitutional: Awake, alert, cooperative, no apparent distress     Lungs: Clear to auscultation bilaterally, no crackles or wheezing   Cardiovascular: Regular rate and rhythm, normal S1 and S2, and no murmur noted   Abdomen: Normal bowel sounds, soft, non-distended, non-tender   Skin: No rashes, no cyanosis, no edema   Other:           Data:   All microbiology laboratory data reviewed.  Recent Labs   Lab Test 10/22/24  0622 10/19/24  0625 10/17/24  0549 10/12/24  2056 09/26/24  0814   WBC  --   --  7.5 14.6* 9.7   HGB  --   --  15.0 15.9 14.1   HCT  --   --  46.0 47.6 42.6   MCV  --   --  86 84 87    245 244 295 239     Recent Labs   Lab Test 10/22/24  0622 10/19/24  0625 10/17/24  0549   CR 0.64 0.71 0.66     No lab results found.  Recent Labs   Lab Test 01/18/19  1538 12/07/18  1203 11/29/18  1644 10/13/18  1705 01/27/17  0935   CULT No beta hemolytic Streptococcus Group A isolated No beta hemolytic Streptococcus Group A isolated No beta hemolytic Streptococcus Group A isolated 50,000 to 100,000 colonies/mL  mixed urogenital elgin  Susceptibility testing not routinely done   >100,000 colonies/mL Coagulase negative Staphylococcus  10,000 to 50,000 colonies/mL Pseudomonas aeruginosa  *        "

## 2024-10-22 NOTE — PROGRESS NOTES
Discharge instructions reviewed with pt.  Verbalizes understanding.  Prescriptions for Gabapentin, Aripiprazole, Vanco, Paroxetine, and Paliperidone sent with pt.  It was too soon to fill Klonopin until 10/25.  Pt aware and ok with it automatically being filled at [facility] on 10/25. Personal belongings gathered and sent with pt.  Pt ambulatory, and accompanied to front entrance with LPN. Leaving via Lyft for home.

## 2024-10-22 NOTE — PLAN OF CARE
Goal Outcome Evaluation:      Plan of Care Reviewed With: patient    Overall Patient Progress: improvingOverall Patient Progress: improving     A&Ox4, VSS,RA. Enteric precautions maintained. Pain treated with oxycodone, flexeril. Anxiety meds given x1. ACHS BS. Patient was reporting feeling overwhelmed and talked with RN about coping skills. No SI at this time. Patient has resources and coping skills. Arm sling in place. Ice applied. Plan to discharge back home this afternoon.     Problem: Adult Inpatient Plan of Care  Goal: Plan of Care Review  Outcome: Progressing  Flowsheets (Taken 10/22/2024 1345)  Plan of Care Reviewed With: patient  Overall Patient Progress: improving  Goal: Patient-Specific Goal (Individualized)   Outcome: Progressing  Goal: Absence of Hospital-Acquired Illness or Injury  Outcome: Progressing  Intervention: Identify and Manage Fall Risk  Recent Flowsheet Documentation  Taken 10/22/2024 0953 by Senia William RN  Safety Promotion/Fall Prevention:   safety round/check completed   room organization consistent   room near nurse's station  Intervention: Prevent Skin Injury  Recent Flowsheet Documentation  Taken 10/22/2024 0953 by Senia William RN  Body Position: position changed independently  Intervention: Prevent Infection  Recent Flowsheet Documentation  Taken 10/22/2024 0953 by Senia William RN  Infection Prevention:   rest/sleep promoted   hand hygiene promoted  Goal: Optimal Comfort and Wellbeing  Outcome: Progressing  Goal: Readiness for Transition of Care  Outcome: Progressing     Problem: Seclusion/Restraint, Behavioral  Goal: Seclusion/Behavioral Restraint Goal: Absence of Harm or Injury  Outcome: Progressing  Intervention: Protect Skin and Joint Integrity  Recent Flowsheet Documentation  Taken 10/22/2024 0953 by Senia William RN  Body Position: position changed independently  Range of Motion: active ROM (range of motion) encouraged     Problem: Suicide Risk  Goal: Absence of  Self-Harm  Outcome: Progressing  Intervention: Assess Risk to Self and Maintain Safety  Recent Flowsheet Documentation  Taken 10/22/2024 0953 by Senia William, RN  Behavior Management:   behavioral plan developed   behavioral plan reviewed  Enhanced Safety Measures: pain management     Problem: Pain Acute  Goal: Optimal Pain Control and Function  Outcome: Progressing  Intervention: Prevent or Manage Pain  Recent Flowsheet Documentation  Taken 10/22/2024 0953 by Senia William, RN  Medication Review/Management: medications reviewed     Problem: Diarrhea  Goal: Effective Diarrhea Management  Outcome: Progressing  Intervention: Manage Diarrhea  Recent Flowsheet Documentation  Taken 10/22/2024 0953 by Senia William, RN  Isolation Precautions: enteric precautions maintained  Medication Review/Management: medications reviewed

## 2024-10-22 NOTE — PLAN OF CARE
"Goal Outcome Evaluation:      Plan of Care Reviewed With: patient    Overall Patient Progress: improvingOverall Patient Progress: improving    Outcome Evaluation: discharge planned for tomorrow- cleared with [facility].    Pain in rt hand controlled with po meds. Pt icing the area, wearing sling & elevating. Some N/T in pinky finger otherwise cms intact. Drsg CD&I. Up indep- walking freq in halls. In good spirits. Med x1 for anxiety. Po vanco for c diff. 1 stool, formed this evening.      Problem: Adult Inpatient Plan of Care  Goal: Plan of Care Review  Description: The Plan of Care Review/Shift note should be completed every shift.  The Outcome Evaluation is a brief statement about your assessment that the patient is improving, declining, or no change.  This information will be displayed automatically on your shift  note.  10/21/2024 2354 by Nohemi Zapata RN  Outcome: Progressing  Flowsheets (Taken 10/21/2024 2354)  Outcome Evaluation: discharge planned for tomorrow- cleared with [facility].  Plan of Care Reviewed With: patient  Overall Patient Progress: improving  10/21/2024 2354 by Nohemi Zapata RN  Outcome: Progressing  Flowsheets (Taken 10/21/2024 2354)  Outcome Evaluation: discharge planned for tomorrow- cleared with [facility].  Plan of Care Reviewed With: patient  Overall Patient Progress: improving  Goal: Patient-Specific Goal (Individualized)  Description: You can add care plan individualizations to a care plan. Examples of Individualization might be:  \"Parent requests to be called daily at 9am for status\", \"I have a hard time hearing out of my right ear\", or \"Do not touch me to wake me up as it startles  me\".  10/21/2024 2354 by Nohemi Zapata RN  Outcome: Progressing  10/21/2024 2354 by Nohemi Zapata RN  Outcome: Progressing  Goal: Absence of Hospital-Acquired Illness or Injury  10/21/2024 2354 by Nohemi Zapata, RN  Outcome: Progressing  10/21/2024 2354 by Nohemi Zapata, RN  Outcome: " Progressing  Intervention: Identify and Manage Fall Risk  Recent Flowsheet Documentation  Taken 10/21/2024 1647 by Nohemi Zapata RN  Safety Promotion/Fall Prevention:   clutter free environment maintained   nonskid shoes/slippers when out of bed   patient and family education   safety round/check completed  Intervention: Prevent Skin Injury  Recent Flowsheet Documentation  Taken 10/21/2024 1647 by Nohemi Zapata RN  Body Position: position changed independently  Goal: Optimal Comfort and Wellbeing  10/21/2024 2354 by Nohemi Zapata RN  Outcome: Progressing  10/21/2024 2354 by Nohemi Zapata RN  Outcome: Progressing  Intervention: Monitor Pain and Promote Comfort  Recent Flowsheet Documentation  Taken 10/21/2024 2156 by Nohemi Zapata RN  Pain Management Interventions: medication (see MAR)  Taken 10/21/2024 1756 by Nohemi Zapata RN  Pain Management Interventions: medication (see MAR)  Taken 10/21/2024 1647 by Nohemi Zapata RN  Pain Management Interventions:   medication (see MAR)   cold applied  Goal: Readiness for Transition of Care  10/21/2024 2354 by Nohemi Zapata RN  Outcome: Progressing  10/21/2024 2354 by Nohemi Zapata RN  Outcome: Progressing     Problem: Seclusion/Restraint, Behavioral  Goal: Seclusion/Behavioral Restraint Goal: Absence of Harm or Injury  10/21/2024 2354 by Nohemi Zapata RN  Outcome: Progressing  10/21/2024 2354 by Nohemi Zapata RN  Outcome: Progressing  Intervention: Protect Skin and Joint Integrity  Recent Flowsheet Documentation  Taken 10/21/2024 1647 by Nohemi Zapata RN  Body Position: position changed independently     Problem: Suicide Risk  Goal: Absence of Self-Harm  10/21/2024 2354 by Nohemi Zapata RN  Outcome: Progressing  10/21/2024 2354 by Nohemi Zapata RN  Outcome: Progressing     Problem: Pain Acute  Goal: Optimal Pain Control and Function  10/21/2024 2354 by Nohemi Zapata RN  Outcome:  Progressing  10/21/2024 2354 by Nohemi Zapata RN  Outcome: Progressing  Intervention: Develop Pain Management Plan  Recent Flowsheet Documentation  Taken 10/21/2024 2156 by Nohemi Zapata RN  Pain Management Interventions: medication (see MAR)  Taken 10/21/2024 1756 by Noheim Zapata RN  Pain Management Interventions: medication (see MAR)  Taken 10/21/2024 1647 by Nohemi Zapata RN  Pain Management Interventions:   medication (see MAR)   cold applied     Problem: Diarrhea  Goal: Effective Diarrhea Management  10/21/2024 2354 by Nohemi Zapata RN  Outcome: Progressing  10/21/2024 2354 by Nohemi Zapata RN  Outcome: Progressing  Intervention: Manage Diarrhea  Recent Flowsheet Documentation  Taken 10/21/2024 1647 by Nohemi Zapata RN  Isolation Precautions: enteric precautions maintained

## 2024-10-23 ENCOUNTER — NURSE TRIAGE (OUTPATIENT)
Dept: FAMILY MEDICINE | Facility: CLINIC | Age: 34
End: 2024-10-23
Payer: MEDICARE

## 2024-10-23 ENCOUNTER — TELEPHONE (OUTPATIENT)
Dept: PHARMACY | Facility: CLINIC | Age: 34
End: 2024-10-23
Payer: MEDICARE

## 2024-10-23 ENCOUNTER — TRANSFERRED RECORDS (OUTPATIENT)
Dept: MULTI SPECIALTY CLINIC | Facility: CLINIC | Age: 34
End: 2024-10-23
Payer: MEDICARE

## 2024-10-23 ENCOUNTER — PATIENT OUTREACH (OUTPATIENT)
Dept: CARE COORDINATION | Facility: CLINIC | Age: 34
End: 2024-10-23
Payer: MEDICARE

## 2024-10-23 DIAGNOSIS — R10.13 EPIGASTRIC PAIN: Primary | ICD-10-CM

## 2024-10-23 LAB — RETINOPATHY: NORMAL

## 2024-10-23 RX ORDER — SUCRALFATE 1 G/1
1 TABLET ORAL 4 TIMES DAILY
Qty: 40 TABLET | Refills: 0 | Status: SHIPPED | OUTPATIENT
Start: 2024-10-23 | End: 2024-11-12

## 2024-10-23 ASSESSMENT — ACTIVITIES OF DAILY LIVING (ADL): DEPENDENT_IADLS:: CLEANING;COOKING;LAUNDRY;MEAL PREPARATION

## 2024-10-23 NOTE — TELEPHONE ENCOUNTER
Prescription sent to pharmacy for Carafate.  Please educate on use.    Needs further evaluation.  Would recommend following up with provider tomorrow.    Becki CARVALHOC

## 2024-10-23 NOTE — TELEPHONE ENCOUNTER
MTM referral from: Transitions of Care (recent hospital discharge, TCU discharge, or ED visit)    MTM referral outreach attempt #1 on October 23, 2024 at 1:16 PM      Outcome: Spoke with patient declined    Use VBC for the carrier/Plan on the flowsheet          Irina Rodriguez [facility]  -[facility]  [phone]

## 2024-10-23 NOTE — TELEPHONE ENCOUNTER
"Nurse Triage SBAR    Is this a 2nd Level Triage? YES, LICENSED PRACTITIONER REVIEW IS REQUIRED    Situation: epigastric pain, \"in the middle right below my ribcage\"    Background: was discharged from hospital yesterday for multiple issues including suicide attempt, Cdiff, fractured base of 5th metacarpal.  Was having epigastric pain then as well which improved until it returned this morning     Assessment: reports that he has epigastric pain/burning but stated that it does not feel like heartburn. He thinks he has an ulcer. Denies any other symptoms besides nausea without vomiting. Still having frequent loose to formed stools but this is not his main concern today.     Protocol Recommended Disposition:   Go To ED/UCC Now (Or To Office With PCP Approval)    Recommendation: to be seen for further eval. Patient declined ED. Advised to try antacid, however, patient stated he does not think it is heartburn.  Advised him to also call ortho about the new numbness in the fractured hand. He can keep his appointment for tomorrow and ask provider to assess abd pain as well.     Routed to provider- has appointment with a different provider tomorrow for numbness in fractured hand and will bring up his abd pain then but would like something to help with symptoms today  Is it ok to wait til tomorrow for further assessment of epigastric pain?    Does the patient meet one of the following criteria for ADS visit consideration? 16+ years old, with an [facility] PCP     TIP  Providers, please consider if this condition is appropriate for management at one of our Acute and Diagnostic Services sites.     If patient is a good candidate, please use dotphrase <dot>triageresponse and select Refer to ADS to document.      Reason for Disposition   MILD TO MODERATE constant pain lasting > 2 hours    Additional Information   Negative: SEVERE difficulty breathing (e.g., struggling for each breath, speaks in single words)   Negative: Shock suspected " "(e.g., cold/pale/clammy skin, too weak to stand, low BP, rapid pulse)   Negative: Difficult to awaken or acting confused (e.g., disoriented, slurred speech)   Negative: Passed out (i.e., lost consciousness, collapsed and was not responding)   Negative: Visible sweat on face or sweat is dripping down   Negative: Sounds like a life-threatening emergency to the triager   Negative: Followed an abdomen (stomach) injury   Negative: Chest pain   Negative: Abdominal pain and pregnant < 20 weeks   Negative: Abdominal pain and pregnant 20 or more weeks   Negative: Abdomen bloating or swelling are main symptoms   Negative: SEVERE abdominal pain (e.g., excruciating)   Negative: Pain lasting > 10 minutes and over 50 years old   Negative: Pain lasting > 10 minutes and over 40 years old and associated chest, arm, neck, upper back, or jaw pain   Negative: Pain lasting > 10 minutes and over 35 years old and at least one cardiac risk factor (e.g., diabetes, high cholesterol, hypertension, obesity, smoker or strong family history of heart disease)   Negative: Pain lasting > 10 minutes and history of heart disease (i.e., heart attack, bypass surgery, angina, angioplasty, CHF)   Negative: Pain lasts > 10 minutes and difficulty breathing   Negative: Vomiting red blood or black (coffee ground) material  (Exception: Few streaks and only occurred once.)   Negative: Blood in bowel movements  (Exception: Blood on surface of BM with constipation.)   Negative: Black or tarry bowel movements  (Exception: Chronic-unchanged black-grey BMs AND is taking iron pills or Pepto-Bismol.)   Negative: Pregnant 20 weeks or more and new hand or face swelling    Answer Assessment - Initial Assessment Questions  1. LOCATION: \"Where does it hurt?\"       Epigastric   2. RADIATION: \"Does the pain shoot anywhere else?\" (e.g., chest, back)      Does not radiate   3. ONSET: \"When did the pain begin?\" (e.g., minutes, hours or days ago)       2 hours ago   4. SUDDEN: " "\"Gradual or sudden onset?\"     Patient is unsure   5. PATTERN \"Does the pain come and go, or is it constant?\"     - If it comes and goes: \"How long does it last?\" \"Do you have pain now?\"      (Note: Comes and goes means the pain is intermittent. It goes away completely between bouts.)     - If constant: \"Is it getting better, staying the same, or getting worse?\"       (Note: Constant means the pain never goes away completely; most serious pain is constant and gets worse.)       Constant   6. SEVERITY: \"How bad is the pain?\"  (e.g., Scale 1-10; mild, moderate, or severe)     - MILD (1-3): Doesn't interfere with normal activities, abdomen soft and not tender to touch..      - MODERATE (4-7): Interferes with normal activities or awakens from sleep, abdomen tender to touch.      - SEVERE (8-10): Excruciating pain, doubled over, unable to do any normal activities.        Mild-moderate pain, still able to move around and get things done   7. RECURRENT SYMPTOM: \"Have you ever had this type of stomach pain before?\" If Yes, ask: \"When was the last time?\" and \"What happened that time?\"       Yes, while inpatient on 10/12/24  8. AGGRAVATING FACTORS: \"Does anything seem to cause this pain?\" (e.g., foods, stress, alcohol)      No   9. CARDIAC SYMPTOMS: \"Do you have any of the following symptoms: chest pain, difficulty breathing, sweating, nausea?\"      Denies   10. OTHER SYMPTOMS: \"Do you have any other symptoms?\" (e.g., back pain, diarrhea, fever, urination pain, vomiting)        Loose stools   11. PREGNANCY: \"Is there any chance you are pregnant?\" \"When was your last menstrual period?\"        N/a    Protocols used: Abdominal Pain - Upper-A-[place]    "

## 2024-10-23 NOTE — TELEPHONE ENCOUNTER
Called pt to relay pcp's message below and medication directions:   Disp Refills Start End TALHA   sucralfate (CARAFATE) 1 GM tablet 40 tablet 0 10/23/2024 -- No   Sig - Route: Take 1 tablet (1 g) by mouth 4 times daily. - Oral   Sent to pharmacy as: Sucralfate 1 GM Oral Tablet (CARAFATE)   Class: E-Prescribe   Order: [id number]   E-Prescribing Status: Receipt confirmed by pharmacy (10/23/2024 12:16 PM CDT)     Pt verbalized understanding and will keep 10/24/24 appointment for further evaluation.     Jie Walters, RN on 10/23/2024 at 12:43 PM

## 2024-10-23 NOTE — PROGRESS NOTES
"Clinic Care Coordination Contact  Transitions of Care Outreach  Chief Complaint   Patient presents with    Clinic Care Coordination - Post Hospital     SW       Most Recent Admission Date: 10/12/2024   Most Recent Admission Diagnosis: Suicidal ideation - R45.851  Closed nondisplaced fracture of base of fifth metacarpal bone of right hand, initial encounter - S62.346A  Diarrhea, unspecified type - R19.7  Drug overdose of undetermined intent, initial encounter - T50.904A     Most Recent Discharge Date: 10/22/2024   Most Recent Discharge Diagnosis: Drug overdose of undetermined intent, initial encounter - T50.904A  Closed nondisplaced fracture of base of fifth metacarpal bone of right hand, initial encounter - S62.346A  Diarrhea, unspecified type - R19.7  Suicidal ideation - R45.851  Schizoaffective disorder, bipolar type (H) - F25.0  Herpes labialis - B00.1  Major depressive disorder, recurrent severe without psychotic features (H) - F33.2  Anxiety - F41.9  AVA (generalized anxiety disorder) - F41.1  Flank pain - R10.9  Mood disorder (H) - F39  C. difficile colitis - A04.72     Transitions of Care Assessment    Discharge Assessment  How are you doing now that you are home?: \"Good\"  How are your symptoms? (Red Flag symptoms escalate to triage hotline per guidelines): Improved  Do you know how to contact your clinic care team if you have future questions or changes to your health status? : Yes  Does the patient have their discharge instructions? : Yes  Does the patient have questions regarding their discharge instructions? : No  Were you started on any new medications or were there changes to any of your previous medications? : No  Does the patient have all of their medications?: Yes  Do you have questions regarding any of your medications? : No  Do you have all of your needed medical supplies or equipment (DME)?  (i.e. oxygen tank, CPAP, cane, etc.): Yes    Post-op ([facility] CTA Only)  If the patient had a surgery or " procedure, do they have any questions for a nurse?: No    Post-op (Clinicians Only)  Did the patient have surgery or a procedure: No  Fever: No  Chills: No  Eating & Drinking: eating and drinking without complaints/concerns  PO Intake: regular diet  Additional Symptoms:  (N/A)  Bowel Function: normal  Urinary Status: voiding without complaint/concerns      Follow up Plan     Discharge Follow-Up  Discharge follow up appointment scheduled in alignment with recommended follow up timeframe or Transitions of Risk Category? (Low = within 30 days; Moderate= within 14 days; High= within 7 days): Yes  Discharge Follow Up Appointment Date: 10/31/24 (ortho appt scheduled for 10/24/2024)  Discharge Follow Up Appointment Scheduled with?: Primary Care Provider    Future Appointments   Date Time Provider Department Center   10/24/2024  2:30 PM Chinedu Stinson MD [facility]   10/26/2024  3:45 PM [id number]   10/31/2024  9:30 AM Becki Avery, APRN CNP [facility]   4/14/2025  1:45 PM Mark Cleary MD [facility]       Outpatient Plan as outlined on AVS reviewed with patient.    For any urgent concerns, please contact our 24 hour nurse triage line: [phone] ([phone])       RAYMUNDO Augustin, MSW   [facility]  Care Coordination  [facility]  [phone]  10/23/2024 9:50 AM

## 2024-10-24 ENCOUNTER — OFFICE VISIT (OUTPATIENT)
Dept: INTERNAL MEDICINE | Facility: CLINIC | Age: 34
End: 2024-10-24
Payer: MEDICARE

## 2024-10-24 ENCOUNTER — ANCILLARY PROCEDURE (OUTPATIENT)
Dept: GENERAL RADIOLOGY | Facility: CLINIC | Age: 34
End: 2024-10-24
Attending: STUDENT IN AN ORGANIZED HEALTH CARE EDUCATION/TRAINING PROGRAM
Payer: MEDICARE

## 2024-10-24 VITALS
DIASTOLIC BLOOD PRESSURE: 88 MMHG | SYSTOLIC BLOOD PRESSURE: 126 MMHG | TEMPERATURE: 97.7 F | BODY MASS INDEX: 35.36 KG/M2 | WEIGHT: 220 LBS | OXYGEN SATURATION: 95 % | HEART RATE: 111 BPM | HEIGHT: 66 IN | RESPIRATION RATE: 20 BRPM

## 2024-10-24 DIAGNOSIS — S92.354D CLOSED NONDISPLACED FRACTURE OF FIFTH METATARSAL BONE OF RIGHT FOOT WITH ROUTINE HEALING, SUBSEQUENT ENCOUNTER: ICD-10-CM

## 2024-10-24 DIAGNOSIS — S92.354D CLOSED NONDISPLACED FRACTURE OF FIFTH METATARSAL BONE OF RIGHT FOOT WITH ROUTINE HEALING, SUBSEQUENT ENCOUNTER: Primary | ICD-10-CM

## 2024-10-24 PROCEDURE — 73130 X-RAY EXAM OF HAND: CPT | Mod: TC | Performed by: INTERNAL MEDICINE

## 2024-10-24 PROCEDURE — 99213 OFFICE O/P EST LOW 20 MIN: CPT | Performed by: STUDENT IN AN ORGANIZED HEALTH CARE EDUCATION/TRAINING PROGRAM

## 2024-10-24 NOTE — PROGRESS NOTES
"  Assessment & Plan     (S92.354D) Closed nondisplaced fracture of fifth metatarsal bone of right foot with routine healing, subsequent encounter  (primary encounter diagnosis)  - Seen in ER recently and told to follow up outpatient  - Swelling has increased with numbness/tingling  - Sensation intact on exam, ROM limited  - Patient would like repeat XR, I did  likely no changes on imaging  Plan: Orthopedic  Referral, XR Hand Right         G/E 3 Views              Post Medication Reconciliation Status: discharge medications reconciled, continue medications without change  BMI  Estimated body mass index is 35.51 kg/m  as calculated from the following:    Height as of this encounter: 1.676 m (5' 6\").    Weight as of this encounter: 99.8 kg (220 lb).       Davin Randall is a 34 year old, presenting for the following health issues:  Hospital F/U      10/24/2024     2:17 PM   Additional Questions   Roomed by SIL De Leon LPN   Accompanied by Friend/care giver         10/24/2024     2:17 PM   Patient Reported Additional Medications   Patient reports taking the following new medications none     Via the Health Maintenance questionnaire, the patient has reported the following services have been completed -Eye Exam: [facility] 2024-10-23, this information has been sent to the abstraction team.  History of Present Illness       Reason for visit:  New numbess  Symptom onset:  1-2 weeks ago  Symptoms include:  Pain numbness,tinggling  Symptom intensity:  Moderate  Symptom progression:  Worsening  Had these symptoms before:  No  What makes it worse:  No  What makes it better:  No   He is taking medications regularly.              Hospital Follow-up Visit:    Hospital/Nursing Home/IP Rehab Facility: [facility]  Date of Admission: 10-  Date of Discharge: 10-  Reason(s) for Admission: Acute diarrhea, likely secondary to C. difficile.  Intertrigo of bilateral axilla and " "inframammary region.  Diabetes mellitus type 2..  Schizoaffective disorder with borderline personality disorder..  Depression.  Suicidal attempt.  PTSD.  TBI.  Hypertension.  Fracture base of right fifth metacarpal.  Was the patient in the ICU or did the patient experience delirium during hospitalization?  No    Problems taking medications regularly:  None  Medication changes since discharge: None  Problems adhering to non-medication therapy:  None    Summary of hospitalization:  [facility] discharge summary reviewed  Diagnostic Tests/Treatments reviewed.  Follow up needed: none  Other Healthcare Providers Involved in Patient s Care:         None  Update since discharge: stable.         Plan of care communicated with patient                 Review of Systems  Constitutional, neuro, ENT, endocrine, pulmonary, cardiac, gastrointestinal, genitourinary, musculoskeletal, integument and psychiatric systems are negative, except as otherwise noted.      Objective    /88   Pulse 111   Temp 97.7  F (36.5  C)   Resp 20   Ht 1.676 m (5' 6\")   Wt 99.8 kg (220 lb)   SpO2 95%   Breastfeeding No   BMI 35.51 kg/m    Body mass index is 35.51 kg/m .  Physical Exam  Vitals reviewed.   Constitutional:       Appearance: Normal appearance.   HENT:      Head: Atraumatic.   Eyes:      Extraocular Movements: Extraocular movements intact.   Cardiovascular:      Rate and Rhythm: Normal rate and regular rhythm.   Pulmonary:      Breath sounds: Normal breath sounds.   Abdominal:      Palpations: Abdomen is soft.   Musculoskeletal:      Cervical back: Normal range of motion.      Comments: Right hand 3rd-5th MCP joint swelling   Skin:     General: Skin is warm.   Neurological:      General: No focal deficit present.   Psychiatric:         Mood and Affect: Mood normal.                Signed Electronically by: Chinedu Stinson MD    "

## 2024-10-25 NOTE — TELEPHONE ENCOUNTER
Patient called and wanted PCP to know that she followed up with a provider yesterday, and will also keep her appointment with PCP on 10/31/24.    Tran BENITEZN RN  Triage Nurse  [facility]

## 2024-10-26 ENCOUNTER — HOSPITAL ENCOUNTER (OUTPATIENT)
Dept: MRI IMAGING | Facility: CLINIC | Age: 34
Discharge: HOME | End: 2024-10-26
Attending: NURSE PRACTITIONER | Admitting: NURSE PRACTITIONER
Payer: MEDICARE

## 2024-10-26 DIAGNOSIS — G89.29 CHRONIC BILATERAL LOW BACK PAIN WITH LEFT-SIDED SCIATICA: ICD-10-CM

## 2024-10-26 DIAGNOSIS — M54.42 CHRONIC BILATERAL LOW BACK PAIN WITH LEFT-SIDED SCIATICA: ICD-10-CM

## 2024-10-26 DIAGNOSIS — M54.6 ACUTE MIDLINE THORACIC BACK PAIN: ICD-10-CM

## 2024-10-26 PROCEDURE — A9585 GADOBUTROL INJECTION: HCPCS | Performed by: NURSE PRACTITIONER

## 2024-10-26 PROCEDURE — 72158 MRI LUMBAR SPINE W/O & W/DYE: CPT | Mod: MF

## 2024-10-26 PROCEDURE — 72157 MRI CHEST SPINE W/O & W/DYE: CPT | Mod: MG

## 2024-10-26 PROCEDURE — 255N000002 HC RX 255 OP 636: Performed by: NURSE PRACTITIONER

## 2024-10-26 RX ORDER — GADOBUTROL 604.72 MG/ML
10 INJECTION INTRAVENOUS ONCE
Status: COMPLETED | OUTPATIENT
Start: 2024-10-26 | End: 2024-10-26

## 2024-10-26 RX ADMIN — GADOBUTROL 10 ML: 604.72 INJECTION INTRAVENOUS at 15:44

## 2024-10-28 ENCOUNTER — MYC MEDICAL ADVICE (OUTPATIENT)
Dept: FAMILY MEDICINE | Facility: CLINIC | Age: 34
End: 2024-10-28
Payer: MEDICARE

## 2024-10-28 DIAGNOSIS — M54.17 LUMBOSACRAL RADICULOPATHY AT L5: Primary | ICD-10-CM

## 2024-10-31 ENCOUNTER — TELEPHONE (OUTPATIENT)
Dept: FAMILY MEDICINE | Facility: CLINIC | Age: 34
End: 2024-10-31

## 2024-10-31 NOTE — TELEPHONE ENCOUNTER
Recommend he makes appointment with prescribing provider to discuss safely tapering off medication    Becki CARVALHOC

## 2024-10-31 NOTE — TELEPHONE ENCOUNTER
Called patient. Did they answer the phone: No, left a message on voicemail to return call to the [facility] at [phone], and to ask for any available triage nurse.  (RN did not leave specific details on voicemail for confidential reasons)      Called Amy and related information below per PCP.    Tran CHOI RN  Triage Nurse  [facility]

## 2024-10-31 NOTE — TELEPHONE ENCOUNTER
Patient called back. RN relayed providers message. Patient verbalized understanding.       Patient states that was a provider from the hospital. RN offered to make an appointment with PCP to discuss but patient declined.     Patient stated they are fine and do not need to further discuss.    RN discussed importance of weaning off vs just abruptly stopping med. Patient again declined and stated they do not need an appointment.     Rossy Guerrero RN on 2/7/2024 at 9:59 AM

## 2024-10-31 NOTE — TELEPHONE ENCOUNTER
Amy Case Manger with [facility] that goes out to patients home for visit wanted to pass along a message patient asked her to call about.     Patient has been over using gabapentin script.    Patient is asking that gabapentin stops being prescribed and he would would like to discontinue taking.     Amy stated ok to call Prakash with any further questions.     Rossy Guerrero RN on 10/31/2024 at 10:01 AM

## 2024-11-06 ENCOUNTER — VIRTUAL VISIT (OUTPATIENT)
Dept: FAMILY MEDICINE | Facility: CLINIC | Age: 34
End: 2024-11-06
Payer: MEDICARE

## 2024-11-06 DIAGNOSIS — G47.00 INSOMNIA, UNSPECIFIED TYPE: Primary | ICD-10-CM

## 2024-11-06 PROCEDURE — 99213 OFFICE O/P EST LOW 20 MIN: CPT | Mod: 95 | Performed by: PHYSICIAN ASSISTANT

## 2024-11-06 RX ORDER — HYDROXYZINE HYDROCHLORIDE 25 MG/1
25-50 TABLET, FILM COATED ORAL
Qty: 60 TABLET | Refills: 0 | Status: SHIPPED | OUTPATIENT
Start: 2024-11-06

## 2024-11-06 NOTE — PROGRESS NOTES
"Prakash is a 34 year old who is being evaluated via a billable video visit.    How would you like to obtain your AVS? MyChart  If the video visit is dropped, the invitation should be resent by: Text to cell phone: [phone]  Will anyone else be joining your video visit? No      Assessment & Plan   Problem List Items Addressed This Visit    None  Visit Diagnoses       Insomnia, unspecified type    -  Primary    Relevant Medications    hydrOXYzine HCl (ATARAX) 25 MG tablet           Assessment & Plan  Insomnia  - Patient has been experiencing sleep disturbances for the past few days. Anxiety appears to be a contributing etiological factor. Previous pharmacological interventions for insomnia have been ineffective.  - Prescribe hydroxyzine for sleep. If hydroxyzine is not effective, advise patient to consult with their psychiatrist for alternative pharmacotherapy for insomnia. Consider restarting quetiapine.  - Risks and side effects: Advised patient to avoid operating motor vehicles or consuming alcohol while taking hydroxyzine.    Prescription  Hydroxyzine for sleep, to be taken 25 to 50 mg half an hour to an hour before bedtime and can be redosed if waking up in the middle of the night.    DDx and Dx discussed with and explained to the pt to their satisfaction.  All questions were answered at this time. Pt expressed understanding of and agreement with this dx, tx, and plan. No further workup warranted and standard medication warnings given. I have given the patient a list of pertinent indications for re-evaluation. Will go to the Emergency Department if symptoms worsen or new concerning symptoms arise. Patient left the call in no apparent distress.     BMI  Estimated body mass index is 35.51 kg/m  as calculated from the following:    Height as of 10/24/24: 1.676 m (5' 6\").    Weight as of 10/24/24: 99.8 kg (220 lb).     See Patient Instructions      Subjective   Prakash is a 34 year old, presenting for the following " health issues:  Insomnia        11/6/2024     1:00 PM   Additional Questions   Roomed by Dandy Engle CMA   Accompanied by N/A         11/6/2024     1:00 PM   Patient Reported Additional Medications   Patient reports taking the following new medications No new medications     Video Start Time: 3:35 PM    History of Present Illness       Reason for visit:  New numbess  Symptom onset:  1-2 weeks ago  Symptoms include:  Pain numbness,tinggling  Symptom intensity:  Moderate  Symptom progression:  Worsening  Had these symptoms before:  No  What makes it worse:  No  What makes it better:  No   He is taking medications regularly.     Patient is calling in today regarding insomnia (both waking up frequently and inability to get to sleep)  - Difficulty sleeping started a few days ago  - Normally sleeps 7 to 8 hours a night, going to bed around 8:50  - Difficulty both falling asleep and staying asleep  - Tried Flexeril for sleep without success  - Anxiety contributing to sleep issues  - Tried trazodone without success  - Tried melatonin without success  - Previously on Seroquel which helped with sleep but currently unable to refill prescription per insurance.    Review of Systems  Constitutional, HEENT, cardiovascular, pulmonary, gi and gu systems are negative, except as otherwise noted.      Objective           Vitals:  No vitals were obtained today due to virtual visit.    Physical Exam   GENERAL: alert and no distress  EYES: Eyes grossly normal to inspection.  No discharge or erythema, or obvious scleral/conjunctival abnormalities.  RESP: No audible wheeze, cough, or visible cyanosis.    SKIN: Visible skin clear. No significant rash, abnormal pigmentation or lesions.  NEURO: Cranial nerves grossly intact.  Mentation and speech appropriate for age.  PSYCH: Appropriate affect, tone, and pace of words        Video-Visit Details    Type of service:  Video Visit   Video End Time: 3:47 PM  Originating Location (pt. Location):  Home  Distant Location (provider location):  On-site  Platform used for Video Visit: Evens  Signed Electronically by: VASILE Rosen

## 2024-11-07 ENCOUNTER — MYC REFILL (OUTPATIENT)
Dept: FAMILY MEDICINE | Facility: CLINIC | Age: 34
End: 2024-11-07
Payer: MEDICARE

## 2024-11-07 ENCOUNTER — NURSE TRIAGE (OUTPATIENT)
Dept: FAMILY MEDICINE | Facility: CLINIC | Age: 34
End: 2024-11-07
Payer: MEDICARE

## 2024-11-07 DIAGNOSIS — E11.65 TYPE 2 DIABETES MELLITUS WITH HYPERGLYCEMIA, WITHOUT LONG-TERM CURRENT USE OF INSULIN (H): ICD-10-CM

## 2024-11-07 NOTE — TELEPHONE ENCOUNTER
Called patient and relayed the providers message. RN advised to watch for signs of infection, and red streaking, fever, and advised to follow up if they are not healing. RN advised of the 24 hour triage line and to call with any questions or concerns. He verbalized understanding.     Kristin Machado RN
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
## 2024-11-07 NOTE — PATIENT INSTRUCTIONS
Clyde Randall,    Thank you for allowing [facility] to manage your care.    If you develop worsening/changing symptoms at any time, please be seen in clinic/urgent care or call 911/go to the emergency department for evaluation.    I sent your prescriptions to your pharmacy. For difficulty sleeping, please use hydroxyzine as prescribed. Do not use this medication while driving, operating machinery, with other sedating medications, or while drinking alcohol as it will make you drowsy.    If you have any questions or concerns, please feel free to call us at [phone]    Sincerely,    Adolfo Chavis PA-C    Did you know?      You can schedule a video visit for follow-up appointments as well as future appointments for certain conditions.  Please see the below link.     [email or web address]    If you have not already done so,  I encourage you to sign up for [facility] ([email or web address]).  This will allow you to review your results, securely communicate with a provider, and schedule virtual visits as well.
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
## 2024-11-07 NOTE — TELEPHONE ENCOUNTER
Recommend  over the counter  aleo vera. please give information to watch for and when would need to seek immediate medical attention    Becki CARVALHOC

## 2024-11-08 ENCOUNTER — NURSE TRIAGE (OUTPATIENT)
Dept: NURSING | Facility: CLINIC | Age: 34
End: 2024-11-08
Payer: MEDICARE

## 2024-11-09 NOTE — TELEPHONE ENCOUNTER
Nurse Triage SBAR    Is this a 2nd Level Triage? NO    Situation: Medication Quesiton    Background: Patient was prescribed Clonazepam by psychiatrist and hydroxyzine by PCP.    Assessment: Patient calling to inquire if okay to take medications together.    Reviewed Micromedex with patient:      Protocol Recommended Disposition:   Call Pharmacist Within 24 Hours    Recommendation: According to the protocol, patient should discuss with pharmacist.  Care advice given. Patient verbalizes understanding and agrees with plan of care. Transferred to 24 hour pharmacy.    Jeniffer Edmondson RN  11/08/24 6:30 PM  [facility] Nurse Advisor  Reason for Disposition   [1] Caller has medicine question about med NOT prescribed by PCP AND [2] triager unable to answer question (e.g., compatibility with other med, storage)    Additional Information   Negative: [1] Intentional drug overdose AND [2] suicidal thoughts or ideas   Negative: Drug overdose and triager unable to answer question   Negative: Caller requesting a renewal or refill of a medicine patient is currently taking   Negative: Caller requesting information unrelated to medicine   Negative: Caller requesting information about COVID-19 Vaccine   Negative: Caller requesting information about Emergency Contraception   Negative: Caller requesting information about Combined Birth Control Pills   Negative: Caller requesting information about Progestin Birth Control Pills   Negative: Caller requesting information about Post-Op pain or medicines   Negative: Caller requesting a prescription antibiotic (such as Penicillin) for Strep throat and has a positive culture result   Negative: Caller requesting a prescription anti-viral med (such as Tamiflu) and has influenza (flu) symptoms   Negative: Immunization reaction suspected   Negative: Rash while taking a medicine or within 3 days of stopping it   Negative: [1] Asthma and [2] having symptoms of asthma (cough, wheezing, etc.)    Negative: [1] Symptom of illness (e.g., headache, abdominal pain, earache, vomiting) AND [2] more than mild   Negative: Breastfeeding questions about mother's medicines and diet   Negative: MORE THAN A DOUBLE DOSE of a prescription or over-the-counter (OTC) drug   Negative: [1] DOUBLE DOSE (an extra dose or lesser amount) of prescription drug AND [2] any symptoms (e.g., dizziness, nausea, pain, sleepiness)   Negative: [1] DOUBLE DOSE (an extra dose or lesser amount) of over-the-counter (OTC) drug AND [2] any symptoms (e.g., dizziness, nausea, pain, sleepiness)   Negative: Took another person's prescription drug   Negative: [1] DOUBLE DOSE (an extra dose or lesser amount) of prescription drug AND [2] NO symptoms  (Exception: A double dose of antibiotics.)   Negative: Diabetes drug error or overdose (e.g., took wrong type of insulin or took extra dose)   Negative: [1] Prescription not at pharmacy AND [2] was prescribed by PCP recently (Exception: Triager has access to EMR and prescription is recorded there. Go to Home Care and confirm for pharmacy.)   Negative: [1] Pharmacy calling with prescription question AND [2] triager unable to answer question   Negative: [1] Caller has URGENT medicine question about med that PCP or specialist prescribed AND [2] triager unable to answer question   Negative: Medicine patch causing local rash or itching    Protocols used: Medication Question Call-A-

## 2024-11-11 ENCOUNTER — MYC REFILL (OUTPATIENT)
Dept: FAMILY MEDICINE | Facility: CLINIC | Age: 34
End: 2024-11-11
Payer: MEDICARE

## 2024-11-11 DIAGNOSIS — A08.4 VIRAL GASTROENTERITIS: ICD-10-CM

## 2024-11-11 DIAGNOSIS — F64.9 GENDER INCONGRUENCE: ICD-10-CM

## 2024-11-12 ENCOUNTER — OFFICE VISIT (OUTPATIENT)
Dept: INTERNAL MEDICINE | Facility: CLINIC | Age: 34
End: 2024-11-12
Payer: MEDICARE

## 2024-11-12 ENCOUNTER — NURSE TRIAGE (OUTPATIENT)
Dept: NURSING | Facility: CLINIC | Age: 34
End: 2024-11-12
Payer: MEDICARE

## 2024-11-12 VITALS
HEART RATE: 76 BPM | WEIGHT: 228.78 LBS | SYSTOLIC BLOOD PRESSURE: 140 MMHG | OXYGEN SATURATION: 96 % | DIASTOLIC BLOOD PRESSURE: 90 MMHG | BODY MASS INDEX: 36.93 KG/M2 | TEMPERATURE: 97.9 F | RESPIRATION RATE: 23 BRPM

## 2024-11-12 DIAGNOSIS — K21.00 GASTROESOPHAGEAL REFLUX DISEASE WITH ESOPHAGITIS WITHOUT HEMORRHAGE: ICD-10-CM

## 2024-11-12 DIAGNOSIS — R12 HEARTBURN: Primary | ICD-10-CM

## 2024-11-12 PROCEDURE — 99214 OFFICE O/P EST MOD 30 MIN: CPT | Performed by: PHYSICIAN ASSISTANT

## 2024-11-12 RX ORDER — SUCRALFATE ORAL 1 G/10ML
1 SUSPENSION ORAL 4 TIMES DAILY
Qty: 560 ML | Refills: 0 | Status: SHIPPED | OUTPATIENT
Start: 2024-11-12 | End: 2024-11-26

## 2024-11-12 RX ORDER — PANTOPRAZOLE SODIUM 40 MG/1
40 TABLET, DELAYED RELEASE ORAL DAILY
Qty: 30 TABLET | Refills: 0 | Status: SHIPPED | OUTPATIENT
Start: 2024-11-12

## 2024-11-12 NOTE — TELEPHONE ENCOUNTER
"Nurse Triage SBAR    Is this a 2nd Level Triage? NO    Situation: chest pain and upper abdominal pain    Background:   Chest pain started at 5 am.    Completed sucralfate treatment 2-3 weeks ago    Assessment:   Burning chest pain, \"I woke up with acid in my throat.\"  Moderate pain rating  Constant pain x 2 hours  Feels like previously diagnosed GERD    No heartbeat issues  Not dizzy    Protocol Recommended Disposition:   Call PCP Within 24 Hours    Recommendation:   Advised that he can take liquid antacid 30 ml.   Offered appointment but pt would like to try antacid first.    Routed to provider  Does PCP have other recommendations? Care team, please call pt [phone]    Does the patient meet one of the following criteria for ADS visit consideration? 16+ years old, with an [facility] PCP     TIP  Providers, please consider if this condition is appropriate for management at one of our Acute and Diagnostic Services sites.     If patient is a good candidate, please use dotphrase <dot>triageresponse and select Refer to ADS to document.    Loretta Christina RN/[facility] Nurse Advisor      Reason for Disposition   [1] Patient says chest pain feels exactly the same as previously diagnosed \"heartburn\" AND [2] describes burning in chest AND [3] accompanying sour taste in mouth    Additional Information   Negative: SEVERE difficulty breathing (e.g., struggling for each breath, speaks in single words)   Negative: Difficult to awaken or acting confused (e.g., disoriented, slurred speech)   Negative: Shock suspected (e.g., cold/pale/clammy skin, too weak to stand, low BP, rapid pulse)   Negative: Passed out (i.e., lost consciousness, collapsed and was not responding)   Negative: Chest pain lasting longer than 5 minutes and ANY of the following:    history of heart disease  (i.e., heart attack, bypass surgery, angina, angioplasty, CHF; not just a heart murmur)    described as crushing, pressure-like, or heavy    age > 50    age > 30 " "AND at least one cardiac risk factor (i.e., hypertension, diabetes, obesity, smoker or strong family history of heart disease)    not relieved with nitroglycerin     Known GERD   Negative: Heart beating < 50 beats per minute OR > 140 beats per minute   Negative: Visible sweat on face or sweat dripping down face   Negative: Sounds like a life-threatening emergency to the triager   Negative: Followed a chest injury   Negative: SEVERE chest pain   Negative: [1] Chest pain (or \"angina\") comes and goes AND [2] is happening more often (increasing in frequency) or getting worse (increasing in severity)  (Exception: Chest pains that last only a few seconds.)   Negative: Pain also in shoulder(s) or arm(s) or jaw  (Exception: Pain is clearly made worse by movement.)   Negative: Difficulty breathing   Negative: Dizziness or lightheadedness   Negative: Coughing up blood   Negative: Cocaine use within last 3 days   Negative: Major surgery in past month   Negative: Hip or leg fracture (broken bone) in past month (or had cast on leg or ankle in past month)   Negative: Illness requiring prolonged bedrest in past month (e.g., immobilization, long hospital stay)   Negative: Long-distance travel in past month (e.g., car, bus, train, plane; with trip lasting 6 or more hours)   Negative: History of prior \"blood clot\" in leg or lungs (i.e., deep vein thrombosis, pulmonary embolism)   Negative: History of inherited increased risk of blood clots (e.g., Factor 5 Leiden, Anti-thrombin 3, Protein C or Protein S deficiency, Prothrombin mutation)   Negative: Cancer treatment in past six months (or has cancer now)   Negative: [1] Chest pain lasts > 5 minutes AND [2] occurred in past 3 days (72 hours) (Exception: Feels exactly the same as previously diagnosed heartburn and has accompanying sour taste in mouth.)   Negative: Taking a deep breath makes pain worse   Negative: Patient sounds very sick or weak to the triager   Negative: [1] Chest pain " "lasts > 5 minutes AND [2] occurred > 3 days ago (72 hours) AND [3] NO chest pain or cardiac symptoms now   Negative: [1] Chest pain lasts < 5 minutes AND [2] NO chest pain or cardiac symptoms (e.g., breathing difficulty, sweating) now  (Exception: Chest pains that last only a few seconds.)   Negative: Fever > 100.4 F (38.0 C)   Negative: Rash in same area as pain (may be described as \"small blisters\")    Protocols used: Chest Pain-A-AH    "

## 2024-11-12 NOTE — TELEPHONE ENCOUNTER
"Request for medication refill:  testosterone (ANDROGEL/TESTIM) 50 MG/5GM (1%) topical gel     Providers if patient needs an appointment and you are willing to give a one month supply please refill for one month and  send a letter/MyChart using \".SMILLIMITEDREFILL\" .smillimited and route chart to \"P SMI \" (Giving one month refill in non controlled medications is strongly recommended before denial)    If refill has been denied, meaning absolutely no refills without visit, please complete the smart phrase \".smirxrefuse\" and route it to the \"P SMI MED REFILLS\"  pool to inform the patient and the pharmacy.    Ingrid Castro, CMA      "

## 2024-11-12 NOTE — PROGRESS NOTES
"  Assessment & Plan     Heartburn    - magic mouthwash suspension (diphenhydrAMINE, lidocaine, aluminum-magnesium & simethicone); Swish and swallow 10 mLs in mouth every 6 hours as needed (Heartburn).  - pantoprazole (PROTONIX) 40 MG EC tablet; Take 1 tablet (40 mg) by mouth daily.  - sucralfate (CARAFATE) 1 GM/10ML suspension; Take 10 mLs (1 g) by mouth 4 times daily for 14 days.    Gastroesophageal reflux disease with esophagitis without hemorrhage    - pantoprazole (PROTONIX) 40 MG EC tablet; Take 1 tablet (40 mg) by mouth daily.  - sucralfate (CARAFATE) 1 GM/10ML suspension; Take 10 mLs (1 g) by mouth 4 times daily for 14 days.    Reviewed symptoms and exam with patient   Medications as noted above- start with magic mouthwash today. Simple diet.  Start sucralfate and PPI -   Use PPI for the next 30 days.   Monitor  Recheck if not improving with PCP consider EGD if needed -    I spent a total of 30 minutes on the day of the visit.   Time spent by me today doing chart review, history and exam, documentation and further activities per the note    BMI  Estimated body mass index is 36.93 kg/m  as calculated from the following:    Height as of 10/24/24: 1.676 m (5' 6\").    Weight as of this encounter: 103.8 kg (228 lb 12.5 oz).   Weight management plan: Patient was referred to their PCP to discuss a diet and exercise plan.          Davin Randall is a 34 year old, presenting for the following health issues:  Gastrointestinal Problem    History of Present Illness       Reason for visit:  Really bad heartburn  Symptom onset:  Today  Symptoms include:  Chest pain breathing back pain  Symptom intensity:  Severe  Symptom progression:  Worsening  Had these symptoms before:  Yes  Has tried/received treatment for these symptoms:  No  What makes it worse:  No  What makes it better:  Chewing gum   He is taking medications regularly.     GERD/Heartburn    Duration: this very early morning woke up with severe burning and " acid to the back of the throat   Description (location/character/radiation): now with pain in the anterior chest and back.     Intensity:  moderate, severe  Accompanying signs and symptoms:  food getting stuck: no   nausea/vomiting/blood: no   abdominal pain: no   black/tarry or bloody stools: no :  History (similar episodes/previous evaluation): yes hx of GERD   Precipitating or alleviating factors:  worse with .- Had spicy mexican food yesterday  current NSAID/Aspirin use: no   Therapies tried and outcome: antacids and medication not helpful    This morning with Maalox no improvement.  Just drinking water only this morning                     Review of Systems  Constitutional, HEENT, cardiovascular, pulmonary, gi and gu systems are negative, except as otherwise noted.      Objective    BP (!) 140/90 (BP Location: Left arm, Patient Position: Sitting, Cuff Size: Adult Regular)   Pulse 76   Temp 97.9  F (36.6  C) (Temporal)   Resp 23   Wt 103.8 kg (228 lb 12.5 oz)   LMP  (LMP Unknown)   SpO2 96%   BMI 36.93 kg/m    Body mass index is 36.93 kg/m .  Physical Exam   GENERAL: alert and no distress  RESP: lungs clear to auscultation - no rales, rhonchi or wheezes  CV: regular rates and rhythm and normal S1 S2, no S3 or S4  MS: no gross musculoskeletal defects noted, no edema            Signed Electronically by: Nohemi Allen PA-C

## 2024-11-12 NOTE — TELEPHONE ENCOUNTER
Routed to PCP, and covering providers.    FYI, it is noted in protocol that patient does not have diabetes.    Patient does have a dx of diabetes.    Tran CHOI RN  Triage Nurse  [facility]

## 2024-11-12 NOTE — TELEPHONE ENCOUNTER
Patient returned call to clinic ,    Relayed provider message.    Transferred him to central scheduling to see if he could get appointment closer to home in [place].    Christine M Klisch, RN

## 2024-11-13 ENCOUNTER — OFFICE VISIT (OUTPATIENT)
Dept: OBGYN | Facility: CLINIC | Age: 34
End: 2024-11-13
Payer: MEDICARE

## 2024-11-13 VITALS
WEIGHT: 225 LBS | DIASTOLIC BLOOD PRESSURE: 70 MMHG | BODY MASS INDEX: 36.16 KG/M2 | SYSTOLIC BLOOD PRESSURE: 102 MMHG | HEIGHT: 66 IN

## 2024-11-13 DIAGNOSIS — Z11.51 ENCOUNTER FOR SCREENING FOR HUMAN PAPILLOMAVIRUS (HPV): ICD-10-CM

## 2024-11-13 DIAGNOSIS — N94.6 DYSMENORRHEA: ICD-10-CM

## 2024-11-13 DIAGNOSIS — Z11.3 SCREEN FOR STD (SEXUALLY TRANSMITTED DISEASE): ICD-10-CM

## 2024-11-13 DIAGNOSIS — N93.9 ABNORMAL UTERINE BLEEDING: Primary | ICD-10-CM

## 2024-11-13 DIAGNOSIS — E66.812 CLASS 2 OBESITY DUE TO EXCESS CALORIES WITHOUT SERIOUS COMORBIDITY WITH BODY MASS INDEX (BMI) OF 36.0 TO 36.9 IN ADULT: ICD-10-CM

## 2024-11-13 DIAGNOSIS — Z12.4 SCREENING FOR CERVICAL CANCER: ICD-10-CM

## 2024-11-13 DIAGNOSIS — E66.09 CLASS 2 OBESITY DUE TO EXCESS CALORIES WITHOUT SERIOUS COMORBIDITY WITH BODY MASS INDEX (BMI) OF 36.0 TO 36.9 IN ADULT: ICD-10-CM

## 2024-11-13 PROBLEM — N83.202 CYST OF LEFT OVARY: Status: RESOLVED | Noted: 2018-11-05 | Resolved: 2024-11-13

## 2024-11-13 PROBLEM — R46.89 AGGRESSIVE BEHAVIOR: Status: RESOLVED | Noted: 2020-12-13 | Resolved: 2024-11-13

## 2024-11-13 PROCEDURE — 58100 BIOPSY OF UTERUS LINING: CPT | Performed by: OBSTETRICS & GYNECOLOGY

## 2024-11-13 PROCEDURE — 99459 PELVIC EXAMINATION: CPT | Performed by: OBSTETRICS & GYNECOLOGY

## 2024-11-13 PROCEDURE — 99214 OFFICE O/P EST MOD 30 MIN: CPT | Mod: 25 | Performed by: OBSTETRICS & GYNECOLOGY

## 2024-11-13 RX ORDER — ONDANSETRON 4 MG/1
4 TABLET, ORALLY DISINTEGRATING ORAL EVERY 8 HOURS PRN
Qty: 30 TABLET | Refills: 0 | Status: SHIPPED | OUTPATIENT
Start: 2024-11-13

## 2024-11-13 NOTE — NURSING NOTE
"Chief Complaint   Patient presents with    Consult     For hysterectomy       Initial /70 (BP Location: Right arm, Patient Position: Chair, Cuff Size: Adult Large)   Ht 1.676 m (5' 6\")   Wt 102.1 kg (225 lb)   LMP  (LMP Unknown)   Breastfeeding No   BMI 36.32 kg/m   Estimated body mass index is 36.32 kg/m  as calculated from the following:    Height as of this encounter: 1.676 m (5' 6\").    Weight as of this encounter: 102.1 kg (225 lb).  BP completed using cuff size: large    Questioned patient about current smoking habits.  Pt. has never smoked.          The following HM Due: NONE  Pao Jimenez CMA        "

## 2024-11-13 NOTE — TELEPHONE ENCOUNTER
Medication Refill Denied  Reason: Patient needs: provider visit  Provider: I have not called the patient about the Rx denial, please call.  PCS: Please notify the pharmacy, Please contact the patient to explain reasoning provided above and to schedule the patient for a provider visit with me .    Once patient makes an appointment please send back to me to    order temporary refill so that the patient will not run out of medication prior to the scheduled visit.    Anabel Dominique, DO

## 2024-11-13 NOTE — PROGRESS NOTES
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 Pt desires definitive Rx as previously planned, ie hysterectomy/BSO. Pt also realizes that BSO will result in premature menopause and all the negative sequelae related to that, including hot flashes, mood swings, increased risk of cardiovascular disease, etc. Will plan to schedule Robotic TLH/BSO. Pt will need preop w/ PCP to ensure DM is well controlled beforehand.    Dysmenorrhea  - Need to r/o uterine pathology  - US Pelvic Complete with Transvaginal; Future    Screen for STD (sexually transmitted disease)  - NEISSERIA GONORRHOEA PCR  - CHLAMYDIA TRACHOMATIS PCR    Screening for cervical cancer  - Overdue for Pap/HPV co-test  - HPV and Gynecologic Cytology Panel - Recommended Age 30 - 65 Years    Encounter for screening for human papillomavirus (HPV)  - Overdue for Pap/HPV co-test  - HPV and Gynecologic Cytology Panel - Recommended Age 30 - 65 Years    Class 2 obesity due to excess calories without serious comorbidity with body mass index (BMI) of 36.0 to 36.9 in adult  - Potential risk factor for endometrial hyperplasia, CA, and subsequent hysterectomy that is desired.            No follow-ups on file.    Davin Randall is a 34 year old, presenting for the following health issues:  Consult (For hysterectomy)    Pt here for abnormal uterine bleeding. He is a transgender male. He had a TLH/BSO scheduled w/ Dr. Pan in [place] about 3-4 months ago, but he cancelled it due to an illness. In the time  "since, he moved to [place] so he is here to see me today to discuss getting scheduled for surgery.  The patient has a history of heavy bleeding with clots and irregular menstrual cycles.  Episodes of bleeding may only last 2 or 3 days but do significantly impact his lifestyle.  They also associated with significant cramps.  He does take some Tylenol for this to relieve it a little bit.  He does not have any abnormal vaginal discharge.  Of note, he is overdue for a Pap/HPV cotest and has never had a pelvic ultrasound or endometrial biopsy to date despite the abnormal bleeding having been a problem for several years.  He does have a BMI of 36 which is a risk factor for endometrial hyperplasia.  Diabetes is also a risk factor. 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 He is on testosterone Rx to maintain his male gender identity.                       Objective    /70 (BP Location: Right arm, Patient Position: Chair, Cuff Size: Adult Large)   Ht 1.676 m (5' 6\")   Wt 102.1 kg (225 lb)   LMP  (LMP Unknown)   Breastfeeding No   BMI 36.32 kg/m    Body mass index is 36.32 kg/m .  Physical Exam  Constitutional:       General: He is not in acute distress.     Appearance: Normal appearance. He is obese. He is not ill-appearing.   Neurological:      Mental Status: He is alert.     Abdomen- Abdomen soft, non-tender. BS normal. No masses, organomegaly, positive findings: obese  Pelvic- Exam chaperoned by nurse, External genitalia normal, Bartholin's glands normal, Adelphi's glands normal, Urethral meatus normal, Urethra normal, Bladder normal, Vagina with normal rugae, no abnormal lesions, no abnormal discharge, Normal cervix without lesions or mucopus, no cervical motion tenderness, Uterus normal size, shape, and contour, no masses, non-tender, Adnexa normal size without masses or tenderness bilaterally, Anus normal, Pelvic exam limited by " obesity, Pap smear was Done,  HPV Done, GC/Chlam probe was Done, EMBx performed as noted below        PROCEDURE:  Endometrial Biopsy      Indications for procedure:  Evaluation of Abnormal uterine bleeding    Pre-Procedure Medications:  None    The proposed procedure to diagnose the above condition was explained to the patient.  Risks, side effects, benefits, and alternatives were discussed. All questions were answered to patient's satisfaction. The patient gave informed consent.       The patient was placed in the dorsal lithotomy position and the perineum was exposed.  External genitalia appeared normal.  The uterus was mobile with normal size, shape, and consistency, without tenderness.  The adnexae palpated normally on bimanual exam without mass or tenderness. The uterus was anteverted  Appropriate sterile technique was used throughout the procedure.  A speculum was inserted and the cervix was visualized.  The cervix was cleansed with antiseptic solution.  A tenaculum was applied to the anterior lip of the cervix.  The uterus was sounded to 7 cm.  The Pipelle Endometrial Suction Curette was used and 1 rotating pass(s) were made between the fundus and the internal os. A scant sample was obtained and sent to pathology.  Instruments were removed.  The patient experienced mild cramping during the procedure.  This subsided rapidly after instruments were removed.  The patient remained supine for a few moments after the procedure and had no other complications. No heavy bleeding was observed.       The patient was instructed to report any fever, cramping after 48 hours, or bleeding for 24-48 hours heavier than normal menses. OTC NSAIDs may be used for cramping PRN. Sexual relations may be resumed in 2-3 days, or after bleeding has stopped.   Pt. is to contact our office if she has not received the pathology report within 1-2 weeks of the procedure.                 Signed Electronically by: Cipriano Leyva MD

## 2024-11-14 ENCOUNTER — TRANSFERRED RECORDS (OUTPATIENT)
Dept: HEALTH INFORMATION MANAGEMENT | Facility: CLINIC | Age: 34
End: 2024-11-14
Payer: MEDICARE

## 2024-11-14 ENCOUNTER — TELEPHONE (OUTPATIENT)
Dept: FAMILY MEDICINE | Facility: CLINIC | Age: 34
End: 2024-11-14
Payer: MEDICARE

## 2024-11-14 ENCOUNTER — NURSE TRIAGE (OUTPATIENT)
Dept: NURSING | Facility: CLINIC | Age: 34
End: 2024-11-14
Payer: MEDICARE

## 2024-11-14 DIAGNOSIS — F41.1 GAD (GENERALIZED ANXIETY DISORDER): ICD-10-CM

## 2024-11-14 LAB
C TRACH DNA SPEC QL NAA+PROBE: NEGATIVE
N GONORRHOEA DNA SPEC QL NAA+PROBE: NEGATIVE

## 2024-11-14 RX ORDER — TESTOSTERONE GEL, 1% 10 MG/G
100 GEL TRANSDERMAL DAILY
Qty: 30 PACKET | Refills: 2 | Status: SHIPPED | OUTPATIENT
Start: 2024-11-14

## 2024-11-14 RX ORDER — GABAPENTIN 600 MG/1
TABLET ORAL
Qty: 120 TABLET | Refills: 1 | Status: CANCELLED | OUTPATIENT
Start: 2024-11-14

## 2024-11-14 NOTE — CARE PLAN
"Occupational Therapy Group Note:     11/13/23 1551   Group Therapy Session   Group Attendance attended group session   Time Session Began 1015   Time Session Ended 1105   Total Time (minutes) 50   Total # Attendees 6   Group Type task skill   Group Topic Covered balanced lifestyle;coping skills/lifestyle management;emotions/expression;leisure exploration/use of leisure time;structured socialization   Group Session Detail Holiday Themed Artistic Creation   Patient Response/Contribution confronted peers appropriately;cooperative with task;listened actively   Patient Participation Detail Patient engaged in a holiday themed artistic activity in order to promote self expression, maximize autonomy within meaningful tasks, encourage productive use of time, and to maximize attention and focus on a goal-directed task. Patient checked-in to group stating, \"I'm doing very well today.\" Patient was accompanied by O staff. Patient appeared motivated to engage in hands-on artistic activity; patient made a polite request to work on a Thanksgiving themed project this date; writer approved request. Patient seems motivated by hands-on creative activities. Patient offered creative ideas and initiated engagement in artwork. Patient worked diligently on task for full duration. Patient occasionally asked others for recommendations on creative options; otherwise, worked independently. Patient was calm, cooperative, and polite in requests. Intermittently social with others; however, mainly kept to self.         " Detail Level: Detailed Depth Of Biopsy: dermis Was A Bandage Applied: Yes Size Of Lesion In Cm: 0 Biopsy Type: H and E Biopsy Method: Dermablade Anesthesia Type: 1% lidocaine with epinephrine Anesthesia Volume In Cc: 0.5 Hemostasis: Jeanette's Wound Care: Petrolatum Dressing: bandage Destruction After The Procedure: No Type Of Destruction Used: Curettage Curettage Text: The wound bed was treated with curettage after the biopsy was performed. Cryotherapy Text: The wound bed was treated with cryotherapy after the biopsy was performed. Electrodesiccation Text: The wound bed was treated with electrodesiccation after the biopsy was performed. Electrodesiccation And Curettage Text: The wound bed was treated with electrodesiccation and curettage after the biopsy was performed. Silver Nitrate Text: The wound bed was treated with silver nitrate after the biopsy was performed. Lab: 6 Consent: Written consent was obtained and risks were reviewed including but not limited to scarring, infection, bleeding, scabbing, incomplete removal, nerve damage and allergy to anesthesia. Post-Care Instructions: I reviewed with the patient in detail post-care instructions. Patient is to keep the biopsy site dry overnight, and then apply bacitracin twice daily until healed. Patient may apply hydrogen peroxide soaks to remove any crusting. Notification Instructions: Patient will be notified of biopsy results typically in 1-2 weeks. However, patient instructed to call the office if not contacted within 3-4 weeks. Billing Type: Third-Party Bill Information: Selecting Yes will display possible errors in your note based on the variables you have selected. This validation is only offered as a suggestion for you. PLEASE NOTE THAT THE VALIDATION TEXT WILL BE REMOVED WHEN YOU FINALIZE YOUR NOTE. IF YOU WANT TO FAX A PRELIMINARY NOTE YOU WILL NEED TO TOGGLE THIS TO 'NO' IF YOU DO NOT WANT IT IN YOUR FAXED NOTE.

## 2024-11-14 NOTE — TELEPHONE ENCOUNTER
See Telephone Encounter on 10/31/24:    Amy Damico [facility]anger with [facility] that goes out to patients home for visit wanted to pass along a message patient asked her to call about.      Patient has been over using gabapentin script.     Patient is asking that gabapentin stop being prescribed and he would would like to discontinue taking.      Amy stated ok to call Randall with any further questions.          Patient has Virtual/Video Visit scheduled with PCP Becki Avery on Wednesday, 11/20/24.     Can this refill be addressed at that time?    Kaylie Herrera RN BSN  [facility]

## 2024-11-15 DIAGNOSIS — M54.9 INTRACTABLE BACK PAIN: ICD-10-CM

## 2024-11-15 DIAGNOSIS — F41.1 GAD (GENERALIZED ANXIETY DISORDER): Primary | ICD-10-CM

## 2024-11-15 LAB
HPV HR 12 DNA CVX QL NAA+PROBE: NEGATIVE
HPV16 DNA CVX QL NAA+PROBE: NEGATIVE
HPV18 DNA CVX QL NAA+PROBE: NEGATIVE
HUMAN PAPILLOMA VIRUS FINAL DIAGNOSIS: NORMAL

## 2024-11-15 RX ORDER — GABAPENTIN 300 MG/1
300 CAPSULE ORAL 3 TIMES DAILY
Qty: 21 CAPSULE | Refills: 0 | Status: SHIPPED | OUTPATIENT
Start: 2024-11-15 | End: 2024-11-22

## 2024-11-15 NOTE — TELEPHONE ENCOUNTER
Will decrease hsyam to three times per day. Will decrease further next week Updated prescription  sent for 1 week .    Becki CARVALHOC

## 2024-11-15 NOTE — TELEPHONE ENCOUNTER
Bleeding for hours, cut on finger. Left hand, first finger.  Size of pin head, won't stop bleeding. He won't go to the ER for the constant bleeding. He will try direct pressure again. I told him not to remove the gauze or cloth but reinforce it instead.   Ileana Lopez RN  [facility]    Reason for Disposition   [1] Bleeding AND [2] won't stop after 10 minutes of direct pressure (using correct technique)    Additional Information   Negative: [1] Major bleeding (e.g., actively dripping or spurting) AND [2] can't be stopped   Negative: Amputation   Negative: Shock suspected (e.g., cold/pale/clammy skin, too weak to stand, low BP, rapid pulse)   Negative: [1] Knife wound (or other possibly deep cut) AND [2] to chest, abdomen, back, neck, or head   Negative: [1] Self-injury (e.g., cutting, self-harm) AND [2] suicidal or out-of-control   Negative: Sounds like a life-threatening emergency to the triager    Protocols used: Cuts and [id number]

## 2024-11-18 LAB
PATH REPORT.COMMENTS IMP SPEC: NORMAL
PATH REPORT.COMMENTS IMP SPEC: NORMAL
PATH REPORT.FINAL DX SPEC: NORMAL
PATH REPORT.GROSS SPEC: NORMAL
PATH REPORT.MICROSCOPIC SPEC OTHER STN: NORMAL
PATH REPORT.RELEVANT HX SPEC: NORMAL
PHOTO IMAGE: NORMAL

## 2024-11-18 NOTE — TELEPHONE ENCOUNTER
RN called and relayed providers message. Patient stated there is miscommunication. He is still taking gabapentin and wants to continue despite message on 10/31/24 from home care.     Patient reports they currently take   Gabapentin 2 600 mg caps in am, 1 600 mg cap in afternoon and 2 600 mg caps at bedtime.     Patient states this is what he would like to continue taking for dosing.       RN also advised to discuss this at Wednesday virtual visit.       Please advise.       Rossy Guerrero RN on 11/18/2024 at 11:05 AM

## 2024-11-19 NOTE — TELEPHONE ENCOUNTER
Called and notified patient of the below message per PCP.  Patient stated understanding and agreeable with the plan of care.     Tran CHOI RN  Triage Nurse  [facility]

## 2024-11-20 ENCOUNTER — TELEPHONE (OUTPATIENT)
Dept: FAMILY MEDICINE | Facility: CLINIC | Age: 34
End: 2024-11-20

## 2024-11-20 ENCOUNTER — VIRTUAL VISIT (OUTPATIENT)
Dept: FAMILY MEDICINE | Facility: CLINIC | Age: 34
End: 2024-11-20
Payer: MEDICARE

## 2024-11-20 DIAGNOSIS — E66.09 CLASS 2 OBESITY DUE TO EXCESS CALORIES WITHOUT SERIOUS COMORBIDITY WITH BODY MASS INDEX (BMI) OF 36.0 TO 36.9 IN ADULT: ICD-10-CM

## 2024-11-20 DIAGNOSIS — E66.812 CLASS 2 OBESITY DUE TO EXCESS CALORIES WITHOUT SERIOUS COMORBIDITY WITH BODY MASS INDEX (BMI) OF 36.0 TO 36.9 IN ADULT: ICD-10-CM

## 2024-11-20 DIAGNOSIS — K21.00 GASTROESOPHAGEAL REFLUX DISEASE WITH ESOPHAGITIS WITHOUT HEMORRHAGE: ICD-10-CM

## 2024-11-20 DIAGNOSIS — F19.10 POLYSUBSTANCE ABUSE (H): ICD-10-CM

## 2024-11-20 DIAGNOSIS — T42.6X4A GABAPENTIN OVERDOSE, UNDETERMINED INTENT, INITIAL ENCOUNTER: Primary | ICD-10-CM

## 2024-11-20 DIAGNOSIS — Z80.0 FAMILY HISTORY OF ESOPHAGEAL CANCER: ICD-10-CM

## 2024-11-20 LAB
BKR AP ASSOCIATED HPV REPORT: NORMAL
BKR LAB AP GYN ADEQUACY: NORMAL
BKR LAB AP GYN INTERPRETATION: NORMAL
BKR LAB AP PREVIOUS ABNORMAL: NORMAL
PATH REPORT.COMMENTS IMP SPEC: NORMAL
PATH REPORT.COMMENTS IMP SPEC: NORMAL
PATH REPORT.RELEVANT HX SPEC: NORMAL

## 2024-11-20 NOTE — PROGRESS NOTES
Prakash is a 34 year old who is being evaluated via a billable video visit.    How would you like to obtain your AVS? MyChart  If the video visit is dropped, the invitation should be resent by: Text to cell phone: [phone]  Will anyone else be joining your video visit? No      Assessment & Plan     Gabapentin overdose, undetermined intent, initial encounter  Reports has gabapentin available at home.  Will cancel most recent gabapentin prescriptions.  E consult to addiction medicine-declines wanting to see addiction medicine in person due to difficulty establishing rapport and difficulty trusting medical providers.  - Adult E-Consult to Addiction Medicine (Outpt Provider to Specialist Written Question & Response)    Family history of esophageal cancer  Will arrange for upper endoscopy  Start omeprazole daily.  - omeprazole (PRILOSEC) 20 MG DR capsule; Take 1 capsule (20 mg) by mouth daily.  - Adult GI  Referral - Procedure Only; Future    Polysubstance abuse (H)  Reports has gabapentin available at home.  Will cancel most recent gabapentin prescriptions.  E consult to addiction medicine-declines wanting to see addiction medicine in person due to difficulty establishing rapport and difficulty trusting medical providers.  - Adult E-Consult to Addiction Medicine (Outpt Provider to Specialist Written Question & Response)    Class 2 obesity due to excess calories without serious comorbidity with body mass index (BMI) of 36.0 to 36.9 in adult  Will refer to comprehensive weight management.  Recommend avoiding stimulants due to previous history of misuse.  Would not recommend gastric surgery due to concerns for alcohol misuse in the future.  - Adult Comprehensive Weight Management  Referral; Future    Gastroesophageal reflux disease with esophagitis without hemorrhage  Worsening reflux likely secondary to Rybelsus.  Start omeprazole daily.  Arrange for upper endoscopy.  Declines wanting to quit  Rybelsus.  Continue Rybelsus for now.  Plan recheck.  If uncontrolled reflux continues will need to discontinue Rybelsus and start alternate medication.  - omeprazole (PRILOSEC) 20 MG DR capsule; Take 1 capsule (20 mg) by mouth daily.  - Adult GI  Referral - Procedure Only; Future    The longitudinal plan of care for the diagnosis(es)/condition(s) as documented were addressed during this visit. Due to the added complexity in care, I will continue to support Prakash in the subsequent management and with ongoing continuity of care.     Subjective   Prakash is a 34 year old, presenting for the following health issues:  Pain (Heartburn )      2024     9:20 AM   Additional Questions   Roomed by Patient completed e-check in via [facility]     Video Start Time: 11:27 AM    History of Present Illness       Reason for visit:  Really bad heartburn  Symptom onset:  Today  Symptoms include:  Chest pain breathing back pain  Symptom intensity:  Severe  Symptom progression:  Worsening  Had these symptoms before:  Yes  Has tried/received treatment for these symptoms:  No  What makes it worse:  No  What makes it better:  Chewing gum   He is taking medications regularly.             Objective         Video visit completed for recheck.  Strongly desires top surgery.  Has been seeing current plastic surgeon and reports is not able to get top surgery until mental health has been stable for at least 6 months.  Would like referral to alternate plastic surgeon.    Has been having heartburn.  Feels burning in throat.  Brother  of esophageal cancer.  Continues to take Rybelsus.  Has prescription for Carafate.  Burning and pain improves after taking Carafate.  Wishes to continue on Rybelsus.  Does not want to take injections to control blood sugars.  Last upper endoscopy .    Has been struggling to lose weight.  Has been trying.  Takes Rybelsus to help with diabetes.  This does help to control appetite but is still gaining  weight.  Takes lithium to control schizophrenia.    Had previously been referred to have hysterectomy.  At that time had been having heavy vaginal bleeding, this was in June.  Has met with gynecologist.  Does not currently get a menstrual cycle.  Does not think wants to have hysterectomy at this time.  Would need to take estrogen replacement in order to protect bones and does not want to take any more medications.    Reports for some time-over a year has been overusing gabapentin.  Buying gabapentin.  Currently taking 25 capsules of 600 mg gabapentin daily.  Also uses medical marijuana.  Did crack on Thursday.  This was a one-time use.  Does not plan to use again.  Currently has a cold.  Last evening drank 1 bottle of NyQuil.  Denies wanting to end her life or suicidal ideation.  Reports just likes being high.  Is ready to stop using so much gabapentin.  Is concerned about long-term complications of using that much gabapentin.      Vitals:  No vitals were obtained today due to virtual visit.    Physical Exam  Constitutional:       Appearance: Normal appearance.      Comments: Reports is high during visit today.   HENT:      Nose: No congestion.   Eyes:      General: Lids are normal.   Pulmonary:      Effort: No tachypnea, bradypnea or respiratory distress.   Skin:     Coloration: Skin is not ashen, cyanotic, jaundiced or pale.   Neurological:      Mental Status: He is alert.   Psychiatric:         Mood and Affect: Mood normal.         Speech: Speech normal.         Behavior: Behavior normal.            Video-Visit Details    Type of service:  Video Visit   Video End Time:1155  Originating Location (pt. Location):    Distant Location (provider location):  On-site  Platform used for Video Visit: Evens  Signed Electronically by: BROOKLYN Cruz CNP

## 2024-11-20 NOTE — TELEPHONE ENCOUNTER
Called [facility] pharmacy where script is at.    They will cancel all Gabapentin doses.    Tran BENITEZN RN  Triage Nurse  [facility]

## 2024-11-21 ENCOUNTER — HOSPITAL ENCOUNTER (OUTPATIENT)
Facility: CLINIC | Age: 34
End: 2024-11-21
Attending: INTERNAL MEDICINE | Admitting: INTERNAL MEDICINE
Payer: MEDICARE

## 2024-11-21 ENCOUNTER — TELEPHONE (OUTPATIENT)
Dept: GASTROENTEROLOGY | Facility: CLINIC | Age: 34
End: 2024-11-21

## 2024-11-21 ENCOUNTER — E-CONSULT (OUTPATIENT)
Dept: ADDICTION MEDICINE | Facility: CLINIC | Age: 34
End: 2024-11-21
Payer: MEDICARE

## 2024-11-21 NOTE — PROGRESS NOTES
11/21/2024     E-Consult has been accepted.    Interprofessional consultation requested by:  Becki Avery APRN CNP      Clinical Question/Purpose: MY CLINICAL QUESTION IS: Abusing gabapentin.  Currently taking 25 capsules of 600 mg gabapentin.  Uncertain how long has been doing this-things over a year.  Buying gabapentin illegally.  History of polysubstance abuse and schizophrenia.  Did smoke crack on Thursday.  This was one-time does not plan to do it again.  Has prescription for medical marijuana-uses continuously throughout the day.  Last night drank 1 bottle of NyQuil.  No suicidal ideation, does not want to die-just likes being high but is ready to quit eat overusing gabapentin-concerned about effects on body.  Did not want to see addiction medicine in person.  Has difficulty trusting other providers and building rapport.  Looking for direction on how to safely wean off of gabapentin and recommendations regarding gabapentin misuse    Patient assessment and information reviewed: E-consult notes above  Virtual visit Nov 20, 2024      Recommendations: Tapering gabapentin is typically completed over a prolonged period of time under medical mgmt. Expected withdrawal symptoms typically include nausea, dizziness, headaches, insomnia, and anxiety. It is possible for withdrawals to cause a seizure - though this is not common/expected, we caution against abrupt discontinuation for this reason.    Best possible outcome would be a monitored detoxification. Please see resources below for facilities that this patient can contact for potential enrollment.    Ideal tapering is done slowly over a long period of time. With a patient that is using excessive amounts of gabapentin, self-directed from illicit sources, and is reportedly unable to cut down effectively without provider support, the diagnosis of a gabapentin use disorder is appropriate. We typically avoid prolonged tapers for patients with moderate/severe  substance use disorders because the risk of overuse during the taper process is high. When we are unable to complete outpatient tapers, we generally refer to a monitored setting to complete this.    If a taper is pursued, I would recommend decreasing dose by roughly 10-20% every 1-2 weeks. Once the dose is down to 3 tablets of 600mg daily, then a more nuanced prescription taper would be reasonable, adjusting to the experience of withdrawal symptoms that may arise in the process. Symptoms will typically be fairly consistent in quality with each step of the taper process, though the severity will differ over time.     Medication can be used to support withdrawal symptoms, such as clonidine for anxiety/restlessness (0.1mg BID-TID PRN, with room for titration based on tolerance of any BP changes), antipsychotic medication for mood stabilization and insomnia, short courses of muscle relaxants could be considered (tizanidine, flexeril, robaxin are commonly used, with typical dosing ranges for pain mgmt), hydroxyzine for anxiety, and selective serotonin reuptake inhibitor or other anxiety medication management should be discussed if there is a co-occurring anxiety disorder that will worsen in the process of the taper.      Resources for monitored management of withdrawals:    To check the status of detox bed availability at [facility], call intake at [phone].  Call at 08:00 AM to check on bed availability prior to going to the emergency department.      Community opioid detox options include:    [facility]: [address] [facility] [phone]    [facility]: [address] [place], [place] [address], [phone], [phone]           The recommendations provided in this E-Consult are based on a review of clinical data pertinent to the clinical question presented, without a review of the patient's complete medical record or, the benefit of a comprehensive in-person  or virtual patient evaluation. This consultation should not replace the clinical judgement and evaluation of the provider ordering this E-Consult. Any new clinical issues, or changes in patient status since the filing of this E-Consult will need to be taken into account when assessing these recommendations. Please contact me if you have further questions.    My total time spent reviewing clinical information and formulating assessment was 15 minutes.      Carlos Millan MD

## 2024-11-21 NOTE — TELEPHONE ENCOUNTER
"Endoscopy Scheduling Screen    Have you had any respiratory illness or flu-like symptoms in the last 10 days?  Yes (Schedule at least 10 days from symptom onset)    What is your communication preference for Instructions and/or Bowel Prep?   MyChart    What insurance is in the chart?  Other:  Medic[place]    Ordering/Referring Provider: Alma Avery   (If ordering provider performs procedure, schedule with ordering provider unless otherwise instructed. )    BMI: Estimated body mass index is 36.32 kg/m  as calculated from the following:    Height as of 11/13/24: 1.676 m (5' 6\").    Weight as of 11/13/24: 102.1 kg (225 lb).     Sedation Ordered  MAC/deep sedation.   BMI<= 45 45 < BMI <= 48 48 < BMI < = 50  BMI > 50   No Restrictions No [facility]  No [facility]  [facility] with exceptions Hospital Only OR Only       Do you have a history of malignant hyperthermia?  No    (Females) Are you currently pregnant?   No     Have you been diagnosed or told you have pulmonary hypertension?   No    Do you have an LVAD?  No    Have you been told you have moderate to severe sleep apnea?  No.    Have you been told you have COPD, asthma, or any other lung disease?  No    Do you have any heart conditions?  No     Have you ever had or are you waiting for an organ transplant?  No. Continue scheduling, no site restrictions.    Have you had a stroke or transient ischemic attack (TIA aka \"mini stroke\" in the last 6 months?   No    Have you been diagnosed with or been told you have cirrhosis of the liver?   No.    Are you currently on dialysis?   No    Do you need assistance transferring?   No    BMI: Estimated body mass index is 36.32 kg/m  as calculated from the following:    Height as of 11/13/24: 1.676 m (5' 6\").    Weight as of 11/13/24: 102.1 kg (225 lb).     Is patients BMI > 40 and scheduling location [facility]?  No    Do you take an injectable or oral medication for weight loss or diabetes (excluding insulin)?  Yes, hold time can be up to 7 " days. Please consult with you prescribing provider to discuss endoscopy recommendations. (Please schedule at least 7 days out.)    Do you take the medication Naltrexone?  No    Do you take blood thinners?  No       Prep   Are you currently on dialysis or do you have chronic kidney disease?  No    Do you have a diagnosis of diabetes?  Yes (Golytely Prep)    Do you have a diagnosis of cystic fibrosis (CF)?  No    On a regular basis do you go 3 -5 days between bowel movements?  No    BMI > 40?  No    Preferred Pharmacy:    [facility] – [place], [place] [address] [address] [address]  Phone: [phone] Fax: [phone]      Final Scheduling Details     Procedure scheduled  Upper endoscopy (EGD)    Surgeon:  Christiano     Date of procedure:  1/29/25     Pre-OP / PAC:   Yes - Patient informed of pre-op requirement.    Location  [facility]  Soonest mac openings    Sedation   MAC/Deep Sedation - Per RN assessment.      Patient Reminders:   You will receive a call from a Nurse to review instructions and health history.  This assessment must be completed prior to your procedure.  Failure to complete the Nurse assessment may result in the procedure being cancelled.      On the day of your procedure, please designate an adult(s) who can drive you home stay with you for the next 24 hours. The medicines used in the exam will make you sleepy. You will not be able to drive.      You cannot take public transportation, ride share services, or non-medical taxi service without a responsible caregiver.  Medical transport services are allowed with the requirement that a responsible caregiver will receive you at your destination.  We require that drivers and caregivers are confirmed prior to your procedure.

## 2024-11-24 ENCOUNTER — HOSPITAL ENCOUNTER (EMERGENCY)
Facility: CLINIC | Age: 34
Discharge: HOME | End: 2024-11-24
Attending: EMERGENCY MEDICINE | Admitting: EMERGENCY MEDICINE
Payer: MEDICARE

## 2024-11-24 VITALS
HEIGHT: 67 IN | RESPIRATION RATE: 18 BRPM | DIASTOLIC BLOOD PRESSURE: 88 MMHG | HEART RATE: 113 BPM | OXYGEN SATURATION: 96 % | TEMPERATURE: 98.8 F | WEIGHT: 232.14 LBS | SYSTOLIC BLOOD PRESSURE: 130 MMHG | BODY MASS INDEX: 36.44 KG/M2

## 2024-11-24 DIAGNOSIS — R10.9 FLANK PAIN: ICD-10-CM

## 2024-11-24 LAB
ALBUMIN SERPL BCG-MCNC: 4.6 G/DL (ref 3.5–5.2)
ALBUMIN UR-MCNC: NEGATIVE MG/DL
ALP SERPL-CCNC: 83 U/L (ref 40–150)
ALT SERPL W P-5'-P-CCNC: 36 U/L (ref 0–70)
ANION GAP SERPL CALCULATED.3IONS-SCNC: 17 MMOL/L (ref 7–15)
APPEARANCE UR: CLEAR
AST SERPL W P-5'-P-CCNC: 41 U/L (ref 0–45)
B-OH-BUTYR SERPL-SCNC: <0.18 MMOL/L
BASE EXCESS BLDV CALC-SCNC: -1.3 MMOL/L (ref -3–3)
BASOPHILS # BLD AUTO: 0 10E3/UL (ref 0–0.2)
BASOPHILS NFR BLD AUTO: 0 %
BILIRUB DIRECT SERPL-MCNC: <0.2 MG/DL (ref 0–0.3)
BILIRUB SERPL-MCNC: 0.2 MG/DL
BILIRUB UR QL STRIP: NEGATIVE
BUN SERPL-MCNC: 17.6 MG/DL (ref 6–20)
CALCIUM SERPL-MCNC: 9.1 MG/DL (ref 8.8–10.4)
CHLORIDE SERPL-SCNC: 103 MMOL/L (ref 98–107)
COLOR UR AUTO: ABNORMAL
CREAT SERPL-MCNC: 0.71 MG/DL (ref 0.51–1.17)
EGFRCR SERPLBLD CKD-EPI 2021: >90 ML/MIN/1.73M2
EOSINOPHIL # BLD AUTO: 0.2 10E3/UL (ref 0–0.7)
EOSINOPHIL NFR BLD AUTO: 2 %
ERYTHROCYTE [DISTWIDTH] IN BLOOD BY AUTOMATED COUNT: 14.5 % (ref 10–15)
GLUCOSE SERPL-MCNC: 219 MG/DL (ref 70–99)
GLUCOSE UR STRIP-MCNC: >=1000 MG/DL
HCG SERPL QL: NEGATIVE
HCO3 BLDV-SCNC: 24 MMOL/L (ref 21–28)
HCO3 SERPL-SCNC: 18 MMOL/L (ref 22–29)
HCT VFR BLD AUTO: 48.8 % (ref 35–53)
HGB BLD-MCNC: 16.3 G/DL (ref 11.7–17.7)
HGB UR QL STRIP: NEGATIVE
HOLD SPECIMEN: NORMAL
HOLD SPECIMEN: NORMAL
IMM GRANULOCYTES # BLD: 0 10E3/UL
IMM GRANULOCYTES NFR BLD: 0 %
KETONES UR STRIP-MCNC: NEGATIVE MG/DL
LEUKOCYTE ESTERASE UR QL STRIP: NEGATIVE
LIPASE SERPL-CCNC: 52 U/L (ref 13–60)
LYMPHOCYTES # BLD AUTO: 4.2 10E3/UL (ref 0.8–5.3)
LYMPHOCYTES NFR BLD AUTO: 39 %
MCH RBC QN AUTO: 28.1 PG (ref 26.5–33)
MCHC RBC AUTO-ENTMCNC: 33.4 G/DL (ref 31.5–36.5)
MCV RBC AUTO: 84 FL (ref 78–100)
MONOCYTES # BLD AUTO: 0.5 10E3/UL (ref 0–1.3)
MONOCYTES NFR BLD AUTO: 5 %
NEUTROPHILS # BLD AUTO: 5.7 10E3/UL (ref 1.6–8.3)
NEUTROPHILS NFR BLD AUTO: 53 %
NITRATE UR QL: NEGATIVE
NRBC # BLD AUTO: 0 10E3/UL
NRBC BLD AUTO-RTO: 0 /100
O2/TOTAL GAS SETTING VFR VENT: 0 %
OXYHGB MFR BLDV: 78 % (ref 70–75)
PCO2 BLDV: 42 MM HG (ref 40–50)
PH BLDV: 7.37 [PH] (ref 7.32–7.43)
PH UR STRIP: 5.5 [PH] (ref 5–7)
PLATELET # BLD AUTO: 305 10E3/UL (ref 150–450)
PO2 BLDV: 42 MM HG (ref 25–47)
POTASSIUM SERPL-SCNC: 4.1 MMOL/L (ref 3.4–5.3)
PROT SERPL-MCNC: 7.7 G/DL (ref 6.4–8.3)
RBC # BLD AUTO: 5.8 10E6/UL (ref 3.8–5.9)
RBC URINE: 1 /HPF
SAO2 % BLDV: 80.5 % (ref 70–75)
SODIUM SERPL-SCNC: 138 MMOL/L (ref 135–145)
SP GR UR STRIP: 1.02 (ref 1–1.03)
SQUAMOUS EPITHELIAL: <1 /HPF
UROBILINOGEN UR STRIP-MCNC: NORMAL MG/DL
WBC # BLD AUTO: 10.7 10E3/UL (ref 4–11)
WBC URINE: 2 /HPF

## 2024-11-24 PROCEDURE — 83690 ASSAY OF LIPASE: CPT | Performed by: EMERGENCY MEDICINE

## 2024-11-24 PROCEDURE — 82805 BLOOD GASES W/O2 SATURATION: CPT | Performed by: EMERGENCY MEDICINE

## 2024-11-24 PROCEDURE — 80048 BASIC METABOLIC PNL TOTAL CA: CPT | Performed by: EMERGENCY MEDICINE

## 2024-11-24 PROCEDURE — 99284 EMERGENCY DEPT VISIT MOD MDM: CPT | Mod: 25

## 2024-11-24 PROCEDURE — 96374 THER/PROPH/DIAG INJ IV PUSH: CPT

## 2024-11-24 PROCEDURE — 96361 HYDRATE IV INFUSION ADD-ON: CPT

## 2024-11-24 PROCEDURE — 250N000011 HC RX IP 250 OP 636: Performed by: EMERGENCY MEDICINE

## 2024-11-24 PROCEDURE — 82248 BILIRUBIN DIRECT: CPT | Performed by: EMERGENCY MEDICINE

## 2024-11-24 PROCEDURE — 85025 COMPLETE CBC W/AUTO DIFF WBC: CPT | Performed by: EMERGENCY MEDICINE

## 2024-11-24 PROCEDURE — 36415 COLL VENOUS BLD VENIPUNCTURE: CPT | Performed by: EMERGENCY MEDICINE

## 2024-11-24 PROCEDURE — 250N000013 HC RX MED GY IP 250 OP 250 PS 637: Performed by: EMERGENCY MEDICINE

## 2024-11-24 PROCEDURE — 82310 ASSAY OF CALCIUM: CPT | Performed by: EMERGENCY MEDICINE

## 2024-11-24 PROCEDURE — 84703 CHORIONIC GONADOTROPIN ASSAY: CPT | Performed by: EMERGENCY MEDICINE

## 2024-11-24 PROCEDURE — 82010 KETONE BODYS QUAN: CPT | Performed by: EMERGENCY MEDICINE

## 2024-11-24 PROCEDURE — 82565 ASSAY OF CREATININE: CPT | Performed by: EMERGENCY MEDICINE

## 2024-11-24 PROCEDURE — 81001 URINALYSIS AUTO W/SCOPE: CPT | Performed by: EMERGENCY MEDICINE

## 2024-11-24 PROCEDURE — 258N000003 HC RX IP 258 OP 636: Performed by: EMERGENCY MEDICINE

## 2024-11-24 RX ORDER — METHOCARBAMOL 750 MG/1
750 TABLET, FILM COATED ORAL EVERY 6 HOURS PRN
Qty: 15 TABLET | Refills: 0 | Status: SHIPPED | OUTPATIENT
Start: 2024-11-24

## 2024-11-24 RX ORDER — KETOROLAC TROMETHAMINE 15 MG/ML
15 INJECTION, SOLUTION INTRAMUSCULAR; INTRAVENOUS ONCE
Status: COMPLETED | OUTPATIENT
Start: 2024-11-24 | End: 2024-11-24

## 2024-11-24 RX ORDER — METHOCARBAMOL 750 MG/1
750 TABLET, FILM COATED ORAL ONCE
Status: COMPLETED | OUTPATIENT
Start: 2024-11-24 | End: 2024-11-24

## 2024-11-24 RX ADMIN — METHOCARBAMOL 750 MG: 750 TABLET ORAL at 19:46

## 2024-11-24 RX ADMIN — KETOROLAC TROMETHAMINE 15 MG: 15 INJECTION, SOLUTION INTRAMUSCULAR; INTRAVENOUS at 19:41

## 2024-11-24 RX ADMIN — SODIUM CHLORIDE 1000 ML: 9 INJECTION, SOLUTION INTRAVENOUS at 19:40

## 2024-11-24 ASSESSMENT — ACTIVITIES OF DAILY LIVING (ADL)
ADLS_ACUITY_SCORE: 0

## 2024-11-24 ASSESSMENT — COLUMBIA-SUICIDE SEVERITY RATING SCALE - C-SSRS
2. HAVE YOU ACTUALLY HAD ANY THOUGHTS OF KILLING YOURSELF IN THE PAST MONTH?: NO
6. HAVE YOU EVER DONE ANYTHING, STARTED TO DO ANYTHING, OR PREPARED TO DO ANYTHING TO END YOUR LIFE?: NO
1. IN THE PAST MONTH, HAVE YOU WISHED YOU WERE DEAD OR WISHED YOU COULD GO TO SLEEP AND NOT WAKE UP?: NO

## 2024-11-25 ENCOUNTER — TELEPHONE (OUTPATIENT)
Dept: FAMILY MEDICINE | Facility: CLINIC | Age: 34
End: 2024-11-25

## 2024-11-25 ENCOUNTER — VIRTUAL VISIT (OUTPATIENT)
Dept: FAMILY MEDICINE | Facility: CLINIC | Age: 34
End: 2024-11-25
Payer: MEDICARE

## 2024-11-25 DIAGNOSIS — T42.6X2A: Primary | ICD-10-CM

## 2024-11-25 DIAGNOSIS — M79.18 MUSCULOSKELETAL PAIN: ICD-10-CM

## 2024-11-25 PROCEDURE — G2211 COMPLEX E/M VISIT ADD ON: HCPCS | Mod: 95 | Performed by: NURSE PRACTITIONER

## 2024-11-25 PROCEDURE — 99214 OFFICE O/P EST MOD 30 MIN: CPT | Mod: 95 | Performed by: NURSE PRACTITIONER

## 2024-11-25 RX ORDER — GABAPENTIN 600 MG/1
TABLET ORAL
Qty: 189 TABLET | Refills: 0 | Status: SHIPPED | OUTPATIENT
Start: 2024-11-25

## 2024-11-25 NOTE — TELEPHONE ENCOUNTER
Patient calling in regards to gabapentin script sent today.     gabapentin (NEURONTIN) 600 MG tablet 189 tablet 0 11/25/2024 -- No   Sig: Take 9 caps three times per day.   Sent to pharmacy as: Gabapentin 600 MG Oral Tablet (NEURONTIN)       Pharmacy cannot fill without prior authorization. I [facility]. Appears they have already started processing this, but still will not have an immediate answer on coverage or able to get script filled.      Patients states he is completely out of gabapentin and you guys discussed he should not be without out. Patient wanting PCP to advise what he should do until PA comes back?      Please advise.      Rossy Guerrero RN on 11/25/2024 at 2:40 PM

## 2024-11-25 NOTE — ED PROVIDER NOTES
History     Chief Complaint:  Flank Pain      HPI   Prakash Prasad is a 34 year old adult who presents with a history of DM2 and kidney stones amongst others presenting to the ED for the evaluation of flank pain. The patient reports that he has been experiencing pain localized to right flank and low right back starting this morning. He denies midline back pain, stating that it has remained constant in his right side and does not radiate elsewhere. The patient denies chest pain, difficulty breathing, abdominal pain, vomiting, diarrhea, constipation, bloody stools, dysuria, numbness or tingling in legs, or joint pain. He also denies any recent fall or injury. The patient discusses his history of kidney stones, stating that they have occasionally passed on their own but other times required urologist intervention. He also reports history of cholecystectomy. The patient states that he's been experiencing cold symptoms recently, but denies any current cough or congestion. Other than Tylenol, he denies taking any other medication for this pain.  NO fevers or chills.    With respect to his diabetes management he also endorses increased thirst occurring yesterday and today, as well as minimal urine output. He is currently managing his diabetes with Jardiance and Rybelsus. He ceased taking insulin roughly six months ago though was briefly placed back on it during a hospital stay in October.     No fevers.  Pt states that the pain feels like a pulled muscle.  Does not feel the same as prior kidney stones.  Denies numbness, tingling or weakness in the extremities.\    Has felt more thirsty lately.  Blood sugars at home reported in the mid 100s.  Taking diabetes medications as prescribed.    Review of External notes      Medications:     Amlodipine  Aripiprazole  Baclofen  Benztropine  Brexpiprazole  Clonazepam  Clonidine  Cyclobenzaprine  Deutetrabenazine  Duloxetine  Empagliflozin  Enoxaparin  Eszopiclone  Famotidine  Gabapentin  Hydroxyzine  Insulin glargine  Lithium carbonate  Lamotrigine  Lorazepam  Omeprazole  Ondansetron  Paliperidone  Pantoprazole  Paroxetine  Prazosin  Quetiapine  Risperidone  Rosuvastatin  Semaglutide  Sucralfate  Testosterone  Trazodone  Valacyclovir    Past Medical History:    ADHD  Bipolar 1 disorder  Borderline personality disorder  Cauda equina syndrome  Chronic low back pain  Depression  Diabetes mellitus type 2  GERD  Hypertension  Marginal corneal ulcer, left  Nephrolithiasis  Obesity  Polysubstance abuse - methamphetamine, hallucinagen, heroin, marijuana  PONV  PTSD  TBI    Past Surgical History:    Past Surgical History:   Procedure Laterality Date    BACK SURGERY  12/24/2016    BACK SURGERY - For Cauda Equina Syndrome  12/24/2016    COLONOSCOPY      COMBINED CYSTOSCOPY, INSERT STENT URETER(S) Left 8/30/2018    Procedure: COMBINED CYSTOSCOPY, INSERT STENT URETER(S);  Cystoscopy With Left Stent Placement;  Surgeon: Kiran Ulrich MD;  Location: [facility] OR    COMBINED CYSTOSCOPY, RETROGRADES, EXCHANGE STENT URETER(S) Left 10/14/2018    Procedure: COMBINED CYSTOSCOPY, RETROGRADES, EXCHANGE STENT URETER(S);  Cystoscopy and removal of left-sided stent.;  Surgeon: Stiven Olivo MD;  Location:  OR    COMBINED CYSTOSCOPY, RETROGRADES, URETEROSCOPY, INSERT STENT  1/6/2014    Procedure: COMBINED CYSTOSCOPY, RETROGRADES, URETEROSCOPY, INSERT STENT;  Cystyoscopy place left ureteral stent;  Surgeon: Jaun Kimble MD;  Location: [facility] OR    COMBINED CYSTOSCOPY, RETROGRADES, URETEROSCOPY, INSERT STENT Left 10/23/2018    Procedure: Video cystoscopy, left ureteroscopy with stone extraction;  Surgeon: Bull Mast MD;  Location:  OR    CYSTOSCOPY, URETEROSCOPY, COMBINED Right 9/23/2015    Procedure:  "COMBINED CYSTOSCOPY, URETEROSCOPY;  Surgeon: ROME Jett MD;  Location: [facility] OR    ENT SURGERY      ESOPHAGOSCOPY, GASTROSCOPY, DUODENOSCOPY (EGD), COMBINED  4/8/2013    Procedure: COMBINED ESOPHAGOSCOPY, GASTROSCOPY, DUODENOSCOPY (EGD), BIOPSY SINGLE OR MULTIPLE;  Gastroscopy;  Surgeon: Peter Pickard MD;  Location: [facility] GI    ESOPHAGOSCOPY, GASTROSCOPY, DUODENOSCOPY (EGD), COMBINED Left 10/28/2014    Procedure: COMBINED ESOPHAGOSCOPY, GASTROSCOPY, DUODENOSCOPY (EGD), BIOPSY SINGLE OR MULTIPLE;  Surgeon: Narcisa Ramirez MD;  Location:  OR    ESOPHAGOSCOPY, GASTROSCOPY, DUODENOSCOPY (EGD), COMBINED N/A 12/24/2018    Procedure: COMBINED ESOPHAGOSCOPY, GASTROSCOPY, DUODENOSCOPY (EGD), BIOPSY SINGLE OR MULTIPLE;  Surgeon: Sonu Verduzco MD;  Location: [facility] GI    INJECT EPIDURAL TRANSFORAMINAL LUMBAR / SACRAL EA ADDITIONAL LEVEL Left 3/18/2021    Procedure: Left L5/S1 transforaminal injection for selective L5 nerve root block;  Surgeon: Eliza Pagan MD;  Location: [facility] OR    LAPAROSCOPIC CHOLECYSTECTOMY  11/20/2014    [facility], Dr. Ramirez    LASER HOLMIUM LITHOTRIPSY URETER(S), INSERT STENT, COMBINED  4/2/2014    Procedure: COMBINED CYSTOSCOPY, URETEROSCOPY, LASER HOLMIUM LITHOTRIPSY URETER(S), INSERT STENT;  Cystoscopy,Left Ureteral Stent Removal,Left Ureteroscopy with Laser Lithotripsy,Left Ureter Stent Placement;  Surgeon: ROME Jett MD;  Location: [facility] OR    Transurethral stone resection  03/11/2011          Physical Exam   Patient Vitals for the past 24 hrs:   BP Temp Temp src Pulse Resp SpO2 Height Weight   11/24/24 1809 130/88 98.8  F (37.1  C) Temporal 113 18 96 % 1.689 m (5' 6.5\") 105.3 kg (232 lb 2.3 oz)        Physical Exam  Gen: alert  CV: 2+ DP and PT pulses BLE  Abdomen: soft, nontender  Back: No midline tenderness, no paraspinous muscle tenderness  MSK: lower extremities with good AROM at hip, knee and ankle  Neuro: 5/5 strength BLE in hip flexion and ext, knee flexion and ext, " plantar and dorsiflexion, and extension of EHL,  ambulatory with steady gait  Skin: skin over torso normal         Emergency Department Course     Imaging:  No orders to display        Laboratory:  Labs Ordered and Resulted from Time of ED Arrival to Time of ED Departure   BASIC METABOLIC PANEL - Abnormal       Result Value    Sodium 138      Potassium 4.1      Chloride 103      Carbon Dioxide (CO2) 18 (*)     Anion Gap 17 (*)     Urea Nitrogen 17.6      Creatinine 0.71      GFR Estimate >90      Calcium 9.1      Glucose 219 (*)    ROUTINE UA WITH MICROSCOPIC REFLEX TO CULTURE - Abnormal    Color Urine Light Yellow      Appearance Urine Clear      Glucose Urine >=1000 (*)     Bilirubin Urine Negative      Ketones Urine Negative      Specific Gravity Urine 1.020      Blood Urine Negative      pH Urine 5.5      Protein Albumin Urine Negative      Urobilinogen Urine Normal      Nitrite Urine Negative      Leukocyte Esterase Urine Negative      RBC Urine 1      WBC Urine 2      Squamous Epithelials Urine <1     BLOOD GAS VENOUS - Abnormal    pH Venous 7.37      pCO2 Venous 42      pO2 Venous 42      Bicarbonate Venous 24      Base Excess/Deficit Venous -1.3      FIO2 0      Oxyhemoglobin Venous 78 (*)     O2 Sat, Venous 80.5 (*)    HCG QUALITATIVE PREGNANCY - Normal    hCG Serum Qualitative Negative     HEPATIC FUNCTION PANEL - Normal    Protein Total 7.7      Albumin 4.6      Bilirubin Total 0.2      Alkaline Phosphatase 83      AST 41      ALT 36      Bilirubin Direct <0.20     LIPASE - Normal    Lipase 52     KETONE BETA-HYDROXYBUTYRATE QUANTITATIVE, RAPID - Normal    Ketone (Beta-Hydroxybutyrate) Quantitative <0.18     CBC WITH PLATELETS AND DIFFERENTIAL    WBC Count 10.7      RBC Count 5.80      Hemoglobin 16.3      Hematocrit 48.8      MCV 84      MCH 28.1      MCHC 33.4      RDW 14.5      Platelet Count 305      % Neutrophils 53      % Lymphocytes 39      % Monocytes 5      % Eosinophils 2      % Basophils 0       % Immature Granulocytes 0      NRBCs per 100 WBC 0      Absolute Neutrophils 5.7      Absolute Lymphocytes 4.2      Absolute Monocytes 0.5      Absolute Eosinophils 0.2      Absolute Basophils 0.0      Absolute Immature Granulocytes 0.0      Absolute NRBCs 0.0            ED Course as of 11/24/24 2117   Sun Nov 24, 2024 1918 I obtained the history and examined the patient as noted above.      2117 I rechecked and updated the patient.        Interventions:  Medications   ketorolac (TORADOL) injection 15 mg (15 mg Intravenous $Given 11/24/24 1941)   sodium chloride 0.9% BOLUS 1,000 mL (1,000 mLs Intravenous $New Bag 11/24/24 1940)   methocarbamol (ROBAXIN) tablet 750 mg (750 mg Oral $Given 11/24/24 1946)        Impression & Plan    Independent Interpretation:  See ED course    Medical Decision Making:  a patient presents for evaluation of right flank pain.  No fall or trauma.  No lower extremity neurologic symptoms.  No abdominal pain.  Laboratory studies overall very reassuring.  Urinalysis is bland without hematuria.  No signs of infection.  Renal function within normal limits.  Patient states pain is not similar to prior stones.  Discussed risk benefits and options for CT scan versus management of musculoskeletal pain with Tylenol, ibuprofen and Robaxin.  She has a we agreed on this.  I did consider DKA however no acidosis and negative ketones.  No midline spinal tenderness to suggest occult spinal infection.  No lower extremity logic changes to suggest need for MRI imaging of the spine or neurosurgical emergency at this time.  there is absolutely no abdominal tenderness on my exam to suggest referred pain from intra-abdominal source such as appendicitis or bowel obstruction.   Discussed signs and symptoms which return the emergency department for any fever increasing pain, new neurologic changes, development of abdominal pain or other worsening.  Recommended see primary care this week within the next few days for  recheck of diabetes and recheck of back pain.  Plan for discharge home.  No pain with deep breathing or SOB to suggest PE.     Disposition:  Discharge    Diagnosis:    ICD-10-CM    1. Flank pain  R10.9            Discharge Medications:  Discharge Medication List as of 11/24/2024  9:20 PM        START taking these medications    Details   methocarbamol (ROBAXIN) 750 MG tablet Take 1 tablet (750 mg) by mouth every 6 hours as needed for muscle spasms (back pain)., Disp-15 tablet, R-0, E-Prescribe              Eden Grullon  November 24, 2024          Eden Grullon MD  11/25/24 1344

## 2024-11-25 NOTE — TELEPHONE ENCOUNTER
Retail Pharmacy Prior Authorization Team   Phone: [phone]    PA Initiation    Medication: GABAPENTIN 600 MG PO TABS  Insurance Company: [employer] - Phone [phone] Fax [phone]  Pharmacy Filling the Rx: [facility] [place] [address] [address] [facility]  Filling Pharmacy Phone: [phone]  Filling Pharmacy Fax:    Start Date: 11/25/2024    [id number]

## 2024-11-25 NOTE — TELEPHONE ENCOUNTER
Please have him check his notes to the pharmacist regarding this prescription.    This is the planned dose and frequency.     Becki JENKINS

## 2024-11-25 NOTE — TELEPHONE ENCOUNTER
I called and spoke to Prakash.   Advised him the PA for the high dose gabapentin is in process but don't know how soon we'll have an answer.   Advised Prakash to  call or go back to pharmacy to see if he can pay out of pocket for what insurance won't cover.    Prakash wonders if it would be possible to just get admitted in the hospital for a few days to get over this dangerous period of weaning down on the gabapentin.   I advised we do not have direct admission capability; he can certainly go back to ER if unable to get the gabapentin and they can determine safest plan for him.    Patient verbalized understanding of and agreement with plan.    Routed to PCP as ARTHUR.    Edith HERNANDEZ RN  [facility] Triage

## 2024-11-25 NOTE — PROGRESS NOTES
Prakash is a 34 year old who is being evaluated via a billable video visit.    How would you like to obtain your AVS? MyChart  If the video visit is dropped, the invitation should be resent by: Text to cell phone: [phone]  Will anyone else be joining your video visit? No      Assessment & Plan     Intentional overdose of gabapentin (H)  Previous E-consult from addiction medicine reviewed with patient.  Reinforced importance of no longer purchasing gabapentin illicitly, importance of continuing to follow closely with provider, not stopping gabapentin suddenly, importance of avoiding other illicit substances and notifying with any withdrawal symptoms.  Did review withdrawal symptoms to watch for.  Currently is taking 18,000 mg of gabapentin daily.  Will decrease total daily dose by 10% or 1800 mg to 16,200 mg total per day or 5400 mg 3 times daily.  Prescription sent to pharmacy for 1 week.  Will plan weekly appointments on Mondays for close follow-up.  Will plan to gradually decrease dose of gabapentin by 10% of current dose every week.  Verbalizes understanding and is in agreement to this plan.  - gabapentin (NEURONTIN) 600 MG tablet; Take 9 caps three times per day.    Musculoskeletal pain  Continue Tylenol as needed.  Alternate ice and heat.  Flexeril routinely for the next 2 to 3 days.    The longitudinal plan of care for the diagnosis(es)/condition(s) as documented were addressed during this visit. Due to the added complexity in care, I will continue to support Prakash in the subsequent management and with ongoing continuity of care.     36 minutes spent by me on the date of the encounter doing chart review, history and exam, documentation and further activities per the note     Subjective   Prakash is a 34 year old, presenting for the following health issues:  RECHECK      11/25/2024    11:09 AM   Additional Questions   Roomed by MP         11/25/2024    11:09 AM   Patient Reported Additional Medications    Patient reports taking the following new medications None per patient     Video Start Time: 11:28 AM    HPI   Follow up from Consult          Video visit for follow up on e-consult to addiction med for gabapentin misuse.  Today took last doses of gabapentin that has available at home.  Took 600 mg, 30 capsules.  Continues to have desire to stop misusing.    Was seen in the emergency department yesterday for right flank pain.  Feels like pulled a muscle.  Pain Shooting down right side of leg. Feels muscular does not feel kidney pain. Given robaxin for muscular pain. Does not feel like is helping. No numbness or tingling. No injury that is aware of. Taking tylenol and that does help. Taking 2 every 8 hours. Has been using roll on pain reliever. Pain started in chest and then went to back. Is muscular and is squeezing.  Pain in chest has resolved.  Is not using any ice or heat.  No loss of control of bowels or bladder.        Objective           Vitals:  No vitals were obtained today due to virtual visit.    Physical Exam  Constitutional:       Appearance: Normal appearance.   HENT:      Nose: No congestion.   Eyes:      General: Lids are normal.   Pulmonary:      Effort: No tachypnea, bradypnea or respiratory distress.   Skin:     Coloration: Skin is not ashen, cyanotic, jaundiced or pale.   Neurological:      Mental Status: He is alert.   Psychiatric:         Mood and Affect: Mood normal.         Speech: Speech normal.         Behavior: Behavior normal.              Video-Visit Details    Type of service:  Video Visit   Video End Time:11:46 AM  Originating Location (pt. Location): Home    Distant Location (provider location):  On-site  Platform used for Video Visit: Evens  Signed Electronically by: BROOKLYN Cruz CNP

## 2024-11-25 NOTE — TELEPHONE ENCOUNTER
I called and spoke to pharmacy, insurance will need PA to provide this amount of gabapentin.    Appears provider wants to use that dose.    PA requested routed to PA team, gabapentin 600 mg, take 9 capsules 3 times daily.    Edith HERNANDEZ RN  [facility] Triage

## 2024-11-25 NOTE — TELEPHONE ENCOUNTER
Liu with [facility] pharmacy calling to clarify script for Gabapentin sent in today       gabapentin (NEURONTIN) 600 MG tablet 189 tablet 0 11/25/2024 -- No   Sig: Take 9 caps three times per day.   Sent to pharmacy as: Gabapentin 600 MG Oral Tablet (NEURONTIN)   Class: E-Prescribe   Notes to Pharmacy: Aware that is over recommended amount.  Has been misusing.  Currently tapering dose       He wants to confirm this script is really supposed to be 600 mg caps 9 caps 3x/day or was this a typo?     He states the maximum dose is 3600 mg/daily and with this script each dose is 5400 mg x3 (16,200 mg/day). He states insurance will not cover this script and will only cover 6 tabs per day which equals the daily allowed dose of 3600 mg.       Please advise.       Rossy Guerrero RN on 11/25/2024 at 12:59 PM

## 2024-11-26 NOTE — TELEPHONE ENCOUNTER
Prior Authorization Approval    Medication: GABAPENTIN 600 MG PO TABS  Authorization Effective Date: 1/1/2024  Authorization Expiration Date: 12/31/2024  Approved Dose/Quantity:   Reference #:     Insurance Company: [employer] - Phone [phone] Fax [phone]  Expected CoPay: $    CoPay Card Available:      Financial Assistance Needed:   Which Pharmacy is filling the prescription: [facility] [id number] [facility]  Pharmacy Notified: Yes  Patient Notified: **Instructed pharmacy to notify patient when script is ready to /ship.**

## 2024-12-02 ENCOUNTER — VIRTUAL VISIT (OUTPATIENT)
Dept: FAMILY MEDICINE | Facility: CLINIC | Age: 34
End: 2024-12-02
Payer: MEDICARE

## 2024-12-02 ENCOUNTER — TELEPHONE (OUTPATIENT)
Dept: FAMILY MEDICINE | Facility: CLINIC | Age: 34
End: 2024-12-02

## 2024-12-02 DIAGNOSIS — B00.1 HERPES LABIALIS: ICD-10-CM

## 2024-12-02 DIAGNOSIS — T42.6X2A: ICD-10-CM

## 2024-12-02 DIAGNOSIS — F41.9 ANXIETY: Primary | ICD-10-CM

## 2024-12-02 PROCEDURE — 99214 OFFICE O/P EST MOD 30 MIN: CPT | Mod: 95 | Performed by: NURSE PRACTITIONER

## 2024-12-02 PROCEDURE — G2211 COMPLEX E/M VISIT ADD ON: HCPCS | Mod: 95 | Performed by: NURSE PRACTITIONER

## 2024-12-02 RX ORDER — GABAPENTIN 600 MG/1
TABLET ORAL
Qty: 168 TABLET | Refills: 0 | Status: SHIPPED | OUTPATIENT
Start: 2024-12-02

## 2024-12-02 RX ORDER — VALACYCLOVIR HYDROCHLORIDE 500 MG/1
500 TABLET, FILM COATED ORAL DAILY
Qty: 90 TABLET | Refills: 0 | Status: SHIPPED | OUTPATIENT
Start: 2024-12-02

## 2024-12-02 NOTE — PROGRESS NOTES
Prakash is a 34 year old who is being evaluated via a billable video visit.    How would you like to obtain your AVS? MyChart  If the video visit is dropped, the invitation should be resent by: Text to cell phone: [phone]  Will anyone else be joining your video visit? No      Assessment & Plan     Herpes labialis  Will plan to start daily for prevention.  - valACYclovir (VALTREX) 500 MG tablet; Take 1 tablet (500 mg) by mouth daily.    Intentional overdose of gabapentin (H)  Will crease dose of gabapentin by 10%.  Decreased to 14,500 total milligrams per day.  Will decrease dose to 4830 mg, 8 capsules, 3 times per day.  Reinforced importance of taking medication as prescribed.  Plan follow-up appointment in 1 week  - gabapentin (NEURONTIN) 600 MG tablet; Take 8 caps three times per day.    Anxiety  Will reach out to psychiatrist.  Concerns for uncontrolled anxiety and potential for misuse if anxiety is uncontrolled.  Would recommend holding off on clonazepam wean until gabapentin wean is completed.    The longitudinal plan of care for the diagnosis(es)/condition(s) as documented were addressed during this visit. Due to the added complexity in care, I will continue to support Prakash in the subsequent management and with ongoing continuity of care.     Subjective   Prakash is a 34 year old, presenting for the following health issues:  Drug Problem      12/2/2024     1:26 PM   Additional Questions   Roomed by Emily JOHNS         12/2/2024     1:26 PM   Patient Reported Additional Medications   Patient reports taking the following new medications None per patient     Video Start Time: 5:47 PM    Drug Problem     Pt states is doing the same since his last visit with Becki.       Visit completed for recheck since gradually weaning down gabapentin.  Has been taking 9 capsules of gabapentin 3 times per day.  Reports is taking it as prescribed.  Denies any withdrawal effects such as increased anxiety, headaches,  dizziness, difficulty sleeping or nausea.  Had visit with psychiatrist today.  Reports psychiatrist plans to discontinue clonazepam at same time.  Is very concerned about how this will affect anxiety.    Takes Valtrex for cold sores.  Stomach is upset.  Getting frequent cold sores.  Not able to take as directed because upset stomach so much.  Has cold sore on upper lip.          Objective           Vitals:  No vitals were obtained today due to virtual visit.    Physical Exam  Constitutional:       Appearance: Normal appearance.   HENT:      Nose: No congestion.   Eyes:      General: Lids are normal.   Pulmonary:      Effort: No tachypnea, bradypnea or respiratory distress.   Skin:     Coloration: Skin is not ashen, cyanotic, jaundiced or pale.   Neurological:      Mental Status: He is alert.   Psychiatric:         Mood and Affect: Mood normal.         Speech: Speech normal.         Behavior: Behavior normal.              Video-Visit Details    Type of service:  Video Visit   Video End Time:6:06 PM  Originating Location (pt. Location): Home    Distant Location (provider location):  On-site  Platform used for Video Visit: Evens  Signed Electronically by: BROOKLYN Cruz CNP

## 2024-12-03 NOTE — TELEPHONE ENCOUNTER
When working with addiction medicine gabapentin needs to be slowly weaned off.  Will be weaning by 10% every week.  Essentially, will decrease dosage by 1 capsule three times per day each week.     Current prescription is for 8 capsules 3 times per day.  So should take approximately 8 to 10 weeks to complete weaning.    Becki JENKINS

## 2024-12-03 NOTE — TELEPHONE ENCOUNTER
Rose with [facility] calling back.     Psychiatrist would like to know what the anticipated timeframe is for weaning off gabapentin    Glynn Woodall RN  [facility]

## 2024-12-03 NOTE — TELEPHONE ENCOUNTER
Left voicemail for [facility] and asked for Rose (they have been in communication with team) to return call back at [phone]. When [facility] calls back, please relay pcp's message below.     Jie Walters RN on 12/3/2024 at 4:16 PM

## 2024-12-03 NOTE — TELEPHONE ENCOUNTER
Called [phone] to reach [facility] ([facility]) and was transferred to medication line that is also for questions for nurse to review and call back. Left VM for [facility] to call clinic back at [phone]. When [facility] team calls back, please relay pcp's message below.     Jie Walters, RN on 12/3/2024 at 10:59 AM

## 2024-12-03 NOTE — TELEPHONE ENCOUNTER
Rose, psych assistant with [facility], is returning call. Relayed message from Becki Avery CNP. She will get this message to Ata and then call us back to let us know if he is in agreement with plan/recommendations. Will await call back.    Lelia Jennings RN

## 2024-12-04 ENCOUNTER — NURSE TRIAGE (OUTPATIENT)
Dept: FAMILY MEDICINE | Facility: CLINIC | Age: 34
End: 2024-12-04
Payer: MEDICARE

## 2024-12-04 NOTE — TELEPHONE ENCOUNTER
Called and advised patient of the orders, advise and message below per PCP.    Patient does have zofran at home, and will follow the advise below.    Patient stated understanding and agreeable with the plan of care.     Tran CHOI RN  Triage Nurse  [facility]

## 2024-12-04 NOTE — TELEPHONE ENCOUNTER
Routing to PCP    ER? Next steps?    Patient is having nausea, vomiting and diarrhea beginning at 6 am this morning.    Has had diarrhea x 2 and vomited x 2.  Currently nauseated    Patient did vomit within 15 min of taking this morning dose of Neurontin.  Should patient retake dose?  Take next dose    Patient believes related to his current Neurontin dose decrease.    However, Patient was with friends yesterday and took a total of 100 mg of edibles. Very atypical for patient and first time using them.    Last edible at 2 pm yesterday.    Patient was hanging out with friends and continued taking them.  Per patient, not intentional.    Denies any other symptoms such as confusion, dizziness altered mental status    RN advised she would send PCP message.    RN did huddle with Stephanie Gunn provider at [facility] as well    Additional Information   Negative: Intentional drug overdose and suicidal thoughts or ideas   Negative: Drug overdose and triager unable to answer question   Negative: Caller requesting a renewal or refill of a medicine patient is currently taking   Negative: Caller requesting information unrelated to medicine   Negative: Caller requesting information about COVID-19 Vaccine   Negative: Caller requesting information about Emergency Contraception   Negative: Caller requesting information about Combined Birth Control Pills   Negative: Caller requesting information about Progestin Birth Control Pills   Negative: Caller requesting information about post-op pain or medicines   Negative: Caller requesting a prescription antibiotic (such as penicillin) for Strep throat and has a positive culture result   Negative: Caller requesting a prescription anti-viral med (such as Tamiflu) and has influenza (flu) symptoms   Negative: Immunization reaction suspected   Negative: Rash while taking a medicine or within 3 days of stopping it   Negative: Asthma and having symptoms of asthma (cough, wheezing, etc.)   Negative:  "Symptom of illness (e.g., headache, abdominal pain, earache, vomiting) that are more than mild   Negative: Breastfeeding questions about mother's medicines and diet    Answer Assessment - Initial Assessment Questions  1. NAME of MEDICINE: \"What medicine(s) are you calling about?\"      Patient currently weaning Neurontin.  Last decrease was 12/2    2. QUESTION: \"What is your question?\" (e.g., double dose of medicine, side effect)      Having nausea and vomiting  Began at 6 am this morning  vomited twice  diarrhea 2    3. PRESCRIBER: \"Who prescribed the medicine?\" Reason: if prescribed by specialist, call should be referred to that group.       PCP  4. SYMPTOMS: \"Do you have any symptoms?\" If Yes, ask: \"What symptoms are you having?\"  \"How bad are the symptoms (e.g., mild, moderate, severe)      Nausea , vomiting thrown up twice    5. PREGNANCY:  \"Is there any chance that you are pregnant?\" \"When was your last menstrual period?\"      NA    Protocols used: Medication Question Call-A-OH      Caden Rucker, RN, BSN, PHN  [facility]  "

## 2024-12-04 NOTE — TELEPHONE ENCOUNTER
Symptoms of gabapentin withdrawal include nausea, dizziness, headache, insomnia and anxiety.  Current symptoms that is having is likely due to THC overuse.  I believe he has Zofran available at home.  Would recommend taking a dose of Zofran, pushing fluids and abstaining from THC containing products.  If symptoms worsen or fail to improve would recommend evaluation in emergency department.  Do not recommend repeating dose of Neurontin/gabapentin.    Becki CARVALHOC

## 2024-12-04 NOTE — TELEPHONE ENCOUNTER
Roes calling back with message for PCP.    Provider Ata Laws ok with primary care tapering gabapentin and once done he will taper clonazepam.    Thanks,  SRINIVAS Mayers  [facility]

## 2024-12-04 NOTE — TELEPHONE ENCOUNTER
Rose calling back, provider message relayed to her. She verbalized understanding.     Kristin Machado RN

## 2024-12-05 NOTE — TELEPHONE ENCOUNTER
Called and notified patient.    Patient stated understanding and agreeable with the plan of care.     Tran BENITEZN RN  Triage Nurse  [facility]

## 2024-12-06 ENCOUNTER — APPOINTMENT (OUTPATIENT)
Dept: CT IMAGING | Facility: CLINIC | Age: 34
End: 2024-12-06
Attending: EMERGENCY MEDICINE
Payer: MEDICARE

## 2024-12-06 ENCOUNTER — HOSPITAL ENCOUNTER (EMERGENCY)
Facility: CLINIC | Age: 34
Discharge: HOME | End: 2024-12-07
Attending: EMERGENCY MEDICINE | Admitting: EMERGENCY MEDICINE
Payer: MEDICARE

## 2024-12-06 DIAGNOSIS — R55 SYNCOPE, UNSPECIFIED SYNCOPE TYPE: ICD-10-CM

## 2024-12-06 LAB
ANION GAP SERPL CALCULATED.3IONS-SCNC: 16 MMOL/L (ref 7–15)
BASOPHILS # BLD AUTO: 0.1 10E3/UL (ref 0–0.2)
BASOPHILS NFR BLD AUTO: 1 %
BUN SERPL-MCNC: 12.8 MG/DL (ref 6–20)
CALCIUM SERPL-MCNC: 9.3 MG/DL (ref 8.8–10.4)
CHLORIDE SERPL-SCNC: 104 MMOL/L (ref 98–107)
CREAT SERPL-MCNC: 0.65 MG/DL (ref 0.51–1.17)
EGFRCR SERPLBLD CKD-EPI 2021: >90 ML/MIN/1.73M2
EOSINOPHIL # BLD AUTO: 0.1 10E3/UL (ref 0–0.7)
EOSINOPHIL NFR BLD AUTO: 2 %
ERYTHROCYTE [DISTWIDTH] IN BLOOD BY AUTOMATED COUNT: 14.4 % (ref 10–15)
GLUCOSE SERPL-MCNC: 139 MG/DL (ref 70–99)
HCO3 SERPL-SCNC: 21 MMOL/L (ref 22–29)
HCT VFR BLD AUTO: 45.5 % (ref 35–53)
HGB BLD-MCNC: 15.3 G/DL (ref 11.7–17.7)
HOLD SPECIMEN: NORMAL
HOLD SPECIMEN: NORMAL
IMM GRANULOCYTES # BLD: 0 10E3/UL
IMM GRANULOCYTES NFR BLD: 0 %
LYMPHOCYTES # BLD AUTO: 3.5 10E3/UL (ref 0.8–5.3)
LYMPHOCYTES NFR BLD AUTO: 40 %
MCH RBC QN AUTO: 28.3 PG (ref 26.5–33)
MCHC RBC AUTO-ENTMCNC: 33.6 G/DL (ref 31.5–36.5)
MCV RBC AUTO: 84 FL (ref 78–100)
MONOCYTES # BLD AUTO: 0.5 10E3/UL (ref 0–1.3)
MONOCYTES NFR BLD AUTO: 6 %
NEUTROPHILS # BLD AUTO: 4.5 10E3/UL (ref 1.6–8.3)
NEUTROPHILS NFR BLD AUTO: 52 %
NRBC # BLD AUTO: 0 10E3/UL
NRBC BLD AUTO-RTO: 0 /100
PLATELET # BLD AUTO: 262 10E3/UL (ref 150–450)
POTASSIUM SERPL-SCNC: 3.6 MMOL/L (ref 3.4–5.3)
RBC # BLD AUTO: 5.41 10E6/UL (ref 3.8–5.9)
SODIUM SERPL-SCNC: 141 MMOL/L (ref 135–145)
TROPONIN T SERPL HS-MCNC: <6 NG/L
WBC # BLD AUTO: 8.8 10E3/UL (ref 4–11)

## 2024-12-06 PROCEDURE — 80048 BASIC METABOLIC PNL TOTAL CA: CPT | Performed by: EMERGENCY MEDICINE

## 2024-12-06 PROCEDURE — 82435 ASSAY OF BLOOD CHLORIDE: CPT | Performed by: EMERGENCY MEDICINE

## 2024-12-06 PROCEDURE — 85025 COMPLETE CBC W/AUTO DIFF WBC: CPT | Performed by: EMERGENCY MEDICINE

## 2024-12-06 PROCEDURE — G1010 CDSM STANSON: HCPCS

## 2024-12-06 PROCEDURE — 93005 ELECTROCARDIOGRAM TRACING: CPT

## 2024-12-06 PROCEDURE — 84484 ASSAY OF TROPONIN QUANT: CPT | Performed by: EMERGENCY MEDICINE

## 2024-12-06 PROCEDURE — 82565 ASSAY OF CREATININE: CPT | Performed by: EMERGENCY MEDICINE

## 2024-12-06 PROCEDURE — 99285 EMERGENCY DEPT VISIT HI MDM: CPT | Mod: 25

## 2024-12-06 PROCEDURE — 36415 COLL VENOUS BLD VENIPUNCTURE: CPT | Performed by: EMERGENCY MEDICINE

## 2024-12-06 PROCEDURE — 70450 CT HEAD/BRAIN W/O DYE: CPT | Mod: MF

## 2024-12-06 ASSESSMENT — ACTIVITIES OF DAILY LIVING (ADL)
ADLS_ACUITY_SCORE: 58

## 2024-12-06 ASSESSMENT — COLUMBIA-SUICIDE SEVERITY RATING SCALE - C-SSRS
6. HAVE YOU EVER DONE ANYTHING, STARTED TO DO ANYTHING, OR PREPARED TO DO ANYTHING TO END YOUR LIFE?: NO
2. HAVE YOU ACTUALLY HAD ANY THOUGHTS OF KILLING YOURSELF IN THE PAST MONTH?: NO
1. IN THE PAST MONTH, HAVE YOU WISHED YOU WERE DEAD OR WISHED YOU COULD GO TO SLEEP AND NOT WAKE UP?: NO

## 2024-12-07 VITALS
HEART RATE: 80 BPM | SYSTOLIC BLOOD PRESSURE: 120 MMHG | TEMPERATURE: 98.9 F | OXYGEN SATURATION: 91 % | DIASTOLIC BLOOD PRESSURE: 75 MMHG | RESPIRATION RATE: 18 BRPM

## 2024-12-07 NOTE — DISCHARGE INSTRUCTIONS
I suspect that you passed out, make sure to drink fluids, eat regularly  Please follow-up with primary care provider    Discharge Instructions  Syncope    Syncope (fainting) is a sudden, short loss of consciousness (passing out spell). People will usually fall to the ground when they faint or slump over if seated.  People may also shake when this happens, and it can sometimes be difficult to tell the difference between syncope and a seizure. At this time, your provider does not find a reason to suspect that your fainting spell is a sign of anything dangerous or life-threatening.  However, sometimes the signs of serious illness do not show up right away.     Generally, every Emergency Department visit should have a follow-up clinic visit with either a primary or a specialty clinic/provider. Please follow-up as instructed by your emergency provider today.    Return to the Emergency Department if:  You faint again.   You have any significant bleeding.  You have chest pain or a fast or irregular heartbeat.  You feel short of breath.  You cough up any blood.  You have abdominal (belly) pain or unusual back pain.  You have ongoing vomiting (throwing up) or diarrhea (loose stools).  You have a black or tarry bowel movement, or blood in the stool or in your vomit.  You have a fever over 101 F.  You lose feeling or cannot move a part of your body or cannot talk normally.  You are confused, have a headache, cannot see well, or have a seizure.  DO NOT DRIVE. CALL 911 INSTEAD!    What can I do to help myself?  Follow any specific instructions that your provider discussed with you.  If you feel light-headed, make sure to sit down right away, even if you have to sit on the floor.  Follow up with your regular medical provider as discussed for further management. This may include lowering your blood pressure medications, insulin or other diabetic medications, checking your blood sugar more frequently, and drinking more fluids,  taking medicines for vomiting or diarrhea or getting up slower.  If you were given a prescription for medicine here today, be sure to read all of the information (including the package insert) that comes with your prescription.  This will include important information about the medicine, its side effects, and any warnings that you need to know about.  The pharmacist who fills the prescription can provide more information and answer questions you may have about the medicine.  If you have questions or concerns that the pharmacist cannot address, please call or return to the Emergency Department.   Remember that you can always come back to the Emergency Department if you are not able to see your regular provider in the amount of time listed above, if you get any new symptoms, or if there is anything that worries you.

## 2024-12-07 NOTE — ED PROVIDER NOTES
Emergency Department Note      History of Present Illness     Chief Complaint   Syncope      HPI   Prakash Prasad is a 34 year old adult with a history of DM2 and hypertension amongst others who presents to the ED following a syncopal episode. He states that earlier this evening while sitting he suffered a syncopal episode without many preceding symptoms. He states that he then fell to the floor, hitting his face in the process. This syncopal episode was reportedly witnessed by a friend who stated that the patient had been twitching whilst on the floor. The patient memory of the incident begins at the ambulance ride to the ED. Here in the ED he endorses a headache as well as upper tooth and lip pain associated with the fall. He states his teeth feel aligned and not loose. He denies neck pain, nausea, or vomiting. He denies any symptoms of illness in the past few days. He denies known familial history of cardiac problem or sudden death. He denies any cardiac history, specifically any history of arrhythmia.     Independent Historian   None    Review of External Notes   None    Past Medical History     Medical History and Problem List   ADHD  Bipolar 1 disorder  Borderline personality disorder  Cauda equina syndrome  Chronic low back pain  Depression  Diabetes mellitus type 2  GERD  Hypertension  Marginal corneal ulcer, left  Nephrolithiasis  Obesity  Polysubstance abuse - methamphetamine, hallucinagen, heroin, marijuana  PONV  PTSD  TBI    Medications   Amlodipine  Aripiprazole  Baclofen  Benztropine  Brexpiprazole  Clonazepam  Clonidine  Cyclobenzaprine  Deutetrabenazine  Duloxetine  Empagliflozin  Enoxaparin  Eszopiclone  Famotidine  Gabapentin  Hydroxyzine  Insulin glargine  Lithium carbonate  Lamotrigine  Lorazepam  Omeprazole  Ondansetron  Paliperidone  Pantoprazole  Paroxetine  Prazosin  Quetiapine  Risperidone  Rosuvastatin  Semaglutide  Sucralfate  Testosterone  Trazodone  Valacyclovir    Surgical History    Past Surgical History:   Procedure Laterality Date    BACK SURGERY  12/24/2016    BACK SURGERY - For Cauda Equina Syndrome  12/24/2016    COLONOSCOPY      COMBINED CYSTOSCOPY, INSERT STENT URETER(S) Left 8/30/2018    Procedure: COMBINED CYSTOSCOPY, INSERT STENT URETER(S);  Cystoscopy With Left Stent Placement;  Surgeon: Kiran Ulrich MD;  Location: [facility] OR    COMBINED CYSTOSCOPY, RETROGRADES, EXCHANGE STENT URETER(S) Left 10/14/2018    Procedure: COMBINED CYSTOSCOPY, RETROGRADES, EXCHANGE STENT URETER(S);  Cystoscopy and removal of left-sided stent.;  Surgeon: Stiven Olivo MD;  Location:  OR    COMBINED CYSTOSCOPY, RETROGRADES, URETEROSCOPY, INSERT STENT  1/6/2014    Procedure: COMBINED CYSTOSCOPY, RETROGRADES, URETEROSCOPY, INSERT STENT;  Cystyoscopy place left ureteral stent;  Surgeon: Jaun Kimble MD;  Location: [facility] OR    COMBINED CYSTOSCOPY, RETROGRADES, URETEROSCOPY, INSERT STENT Left 10/23/2018    Procedure: Video cystoscopy, left ureteroscopy with stone extraction;  Surgeon: Bull Mast MD;  Location:  OR    CYSTOSCOPY, URETEROSCOPY, COMBINED Right 9/23/2015    Procedure: COMBINED CYSTOSCOPY, URETEROSCOPY;  Surgeon: ROME Jett MD;  Location: [facility] OR    ENT SURGERY      ESOPHAGOSCOPY, GASTROSCOPY, DUODENOSCOPY (EGD), COMBINED  4/8/2013    Procedure: COMBINED ESOPHAGOSCOPY, GASTROSCOPY, DUODENOSCOPY (EGD), BIOPSY SINGLE OR MULTIPLE;  Gastroscopy;  Surgeon: Peter Pickard MD;  Location: [facility] GI    ESOPHAGOSCOPY, GASTROSCOPY, DUODENOSCOPY (EGD), COMBINED Left 10/28/2014    Procedure: COMBINED ESOPHAGOSCOPY, GASTROSCOPY, DUODENOSCOPY (EGD), BIOPSY SINGLE OR MULTIPLE;  Surgeon: Naricsa Ramirez MD;  Location:  OR    ESOPHAGOSCOPY, GASTROSCOPY, DUODENOSCOPY (EGD), COMBINED N/A 12/24/2018    Procedure: COMBINED ESOPHAGOSCOPY, GASTROSCOPY, DUODENOSCOPY (EGD), BIOPSY SINGLE OR MULTIPLE;  Surgeon: Sonu Verduzco MD;  Location: [facility] GI    INJECT EPIDURAL TRANSFORAMINAL  LUMBAR / SACRAL EA ADDITIONAL LEVEL Left 3/18/2021    Procedure: Left L5/S1 transforaminal injection for selective L5 nerve root block;  Surgeon: Eliza Pagan MD;  [place] [facility] [place]    LAPAROSCOPIC CHOLECYSTECTOMY  11/20/2014    [facility], Dr. Ramirez    LASER HOLMIUM LITHOTRIPSY URETER(S), INSERT STENT, COMBINED  4/2/2014    Procedure: COMBINED CYSTOSCOPY, URETEROSCOPY, LASER HOLMIUM LITHOTRIPSY URETER(S), INSERT STENT;  Cystoscopy,Left Ureteral Stent Removal,Left Ureteroscopy with Laser Lithotripsy,Left Ureter Stent Placement;  Surgeon: ROME Jett MD;  Location: [facility] OR    Transurethral stone resection  03/11/2011       Physical Exam     Patient Vitals for the past 24 hrs:   BP Temp Temp src Pulse Resp SpO2   12/06/24 2202 117/74 -- -- 78 -- 92 %   12/06/24 2040 -- -- -- 77 -- 90 %   12/06/24 2038 116/74 -- -- 78 -- --   12/06/24 2007 119/81 98.9  F (37.2  C) Oral 84 18 95 %     Physical Exam  General: Sitting up in bed  Eyes:  The pupils are equal and round    Conjunctivae and sclerae are normal  ENT:    Slight swelling on inner lower lip, no bleeding. Teeth stable, no malocclusion  Neck:  Normal range of motion, no midline cervical spine tenderness  CV:  Regular rate, regular rhythm     Skin warm and well perfused   Resp:  Non labored breathing on room air    No tachypnea    No cough heard    Lungs clear bilaterally  GI:  Abdomen is soft, there is no rigidity    No distension    No rebound tenderness     No abdominal tenderness  MS:  Normal muscular tone  Skin:  No rash or acute skin lesions noted  Neuro:   Awake, alert.      Speech is normal and fluent.    Face is symmetric.     Moves all extremities equally  Psych: Normal affect.  Appropriate interactions.    Diagnostics     Lab Results   Labs Ordered and Resulted from Time of ED Arrival to Time of ED Departure   BASIC METABOLIC PANEL - Abnormal       Result Value    Sodium 141      Potassium 3.6      Chloride 104      Carbon Dioxide (CO2) 21  (*)     Anion Gap 16 (*)     Urea Nitrogen 12.8      Creatinine 0.65      GFR Estimate >90      Calcium 9.3      Glucose 139 (*)    TROPONIN T, HIGH SENSITIVITY - Normal    Troponin T, High Sensitivity <6     CBC WITH PLATELETS AND DIFFERENTIAL    WBC Count 8.8      RBC Count 5.41      Hemoglobin 15.3      Hematocrit 45.5      MCV 84      MCH 28.3      MCHC 33.6      RDW 14.4      Platelet Count 262      % Neutrophils 52      % Lymphocytes 40      % Monocytes 6      % Eosinophils 2      % Basophils 1      % Immature Granulocytes 0      NRBCs per 100 WBC 0      Absolute Neutrophils 4.5      Absolute Lymphocytes 3.5      Absolute Monocytes 0.5      Absolute Eosinophils 0.1      Absolute Basophils 0.1      Absolute Immature Granulocytes 0.0      Absolute NRBCs 0.0         Imaging   CT Head w/o Contrast   Final Result   IMPRESSION:   1.  No acute intracranial process.        EKG   ECG taken at 2024, ECG read at 2250  Normal sinus rhythm  Possible left atrial enlargement  Borderline ECG   No significant change as compared to prior, dated 10/13/24.  Rate 79 bpm. PA interval 170 ms. QRS duration 100 ms. QT/QTc 380/435 ms. P-R-T axes 31 10 12.    Independent Interpretation   None    ED Course        Discussion of Management   None    ED Course   ED Course as of 12/07/24 0017   Fri Dec 06, 2024   2246 I obtained history and examined the patient as noted above.   2340 I rechecked the patient and explained findings.       Additional Documentation  None    Medical Decision Making / Diagnosis     CMS Diagnoses: None    MIPS       None    [facility]   Prakash Prasad is a 34 year old adult who presented to the ED with syncope. Possible syncope versus seizure. More likely syncope but give fall, headache, and possible seizure, obtained CT head. No intracranial process seen. EKG normal. BLood work unremarkable. Doubt PE, ACS, arrhythmia, AAA, etc. No concerning etiology of syncope found on workup in ED. No history of seizures. Patient  walked well in ED. No other injuries from fall, cleared cervical spine clinically. Patient would like to go home which I think is reasonable. Recommended follow-up with PCP.    Disposition   The patient was discharged.     Diagnosis     ICD-10-CM    1. Syncope, unspecified syncope type  R55         Scribe Disclosure:  I, RANDY SCHMIDTMALU, am serving as a scribe at 11:17 PM on 12/6/2024 to document services personally performed by Tran Burden MD, based on my observations and the provider's statements to me.      Tran Burden MD  12/07/24 0850

## 2024-12-07 NOTE — ED TRIAGE NOTES
Patient arrived via EMS from home after an episode of syncope. Patient has a hx of T2DM,  for EMS. Patient is now feeling dizzy and has an 8/10 headache. Patient also endorses hitting mouth/teeth when syncopal episode occurred. AVSS.      Triage Assessment (Adult)       Row Name 12/06/24 2001          Triage Assessment    Airway WDL WDL        Respiratory WDL    Respiratory WDL WDL        Skin Circulation/Temperature WDL    Skin Circulation/Temperature WDL WDL        Cardiac WDL    Cardiac WDL WDL        Peripheral/Neurovascular WDL    Peripheral Neurovascular WDL WDL        Cognitive/Neuro/Behavioral WDL    Cognitive/Neuro/Behavioral WDL WDL

## 2024-12-09 ENCOUNTER — VIRTUAL VISIT (OUTPATIENT)
Dept: FAMILY MEDICINE | Facility: CLINIC | Age: 34
End: 2024-12-09
Payer: MEDICARE

## 2024-12-09 DIAGNOSIS — R55 SYNCOPE, UNSPECIFIED SYNCOPE TYPE: Primary | ICD-10-CM

## 2024-12-09 DIAGNOSIS — Z23 NEED FOR PROPHYLACTIC VACCINATION AGAINST HEPATITIS A: ICD-10-CM

## 2024-12-09 DIAGNOSIS — T42.6X2A: ICD-10-CM

## 2024-12-09 DIAGNOSIS — R56.9 SEIZURE-LIKE ACTIVITY (H): ICD-10-CM

## 2024-12-09 LAB
ATRIAL RATE - MUSE: 79 BPM
DIASTOLIC BLOOD PRESSURE - MUSE: NORMAL MMHG
INTERPRETATION ECG - MUSE: NORMAL
P AXIS - MUSE: 31 DEGREES
PR INTERVAL - MUSE: 170 MS
QRS DURATION - MUSE: 100 MS
QT - MUSE: 380 MS
QTC - MUSE: 435 MS
R AXIS - MUSE: 10 DEGREES
SYSTOLIC BLOOD PRESSURE - MUSE: NORMAL MMHG
T AXIS - MUSE: 12 DEGREES
VENTRICULAR RATE- MUSE: 79 BPM

## 2024-12-09 PROCEDURE — 99214 OFFICE O/P EST MOD 30 MIN: CPT | Mod: 95 | Performed by: NURSE PRACTITIONER

## 2024-12-09 PROCEDURE — G2211 COMPLEX E/M VISIT ADD ON: HCPCS | Mod: 95 | Performed by: NURSE PRACTITIONER

## 2024-12-09 RX ORDER — GABAPENTIN 600 MG/1
TABLET ORAL
Qty: 216 TABLET | Refills: 0 | Status: SHIPPED | OUTPATIENT
Start: 2024-12-09

## 2024-12-09 NOTE — PROGRESS NOTES
Prakash is a 34 year old who is being evaluated via a billable video visit.    How would you like to obtain your AVS? Mail a copy  If the video visit is dropped, the invitation should be resent by: Text to cell phone: [phone]  Will anyone else be joining your video visit? No      Assessment & Plan     Intentional overdose of gabapentin (H)  Will pause gabapentin wean for now due to recent possible seizure-like activity.  Continue 8 capsules 3 times per day.  Reinforced importance of taking medication as prescribed and continuing to avoid all other illicit substances.  Keep scheduled appointment for next week.  - gabapentin (NEURONTIN) 600 MG tablet; Take 8 caps three times per day.    Syncope, unspecified syncope type  Previous ER visit reviewed.  Previous labs and imaging reviewed.  Refer urgently to neurology.  - Adult Neurology  Referral; Future    Seizure-like activity (H)  Previous ER visit reviewed.  Previous labs and imaging reviewed.  Refer urgently to neurology.  - Adult Neurology  Referral; Future  Education given regarding concerning symptoms to watch for and when would need to seek immediate medical attention.    The longitudinal plan of care for the diagnosis(es)/condition(s) as documented were addressed during this visit. Due to the added complexity in care, I will continue to support Prakash in the subsequent management and with ongoing continuity of care.     Davin Randall is a 34 year old, presenting for the following health issues:  ER F/U        12/9/2024     2:10 PM   Additional Questions   Roomed by Erin   Accompanied by n/a     Video Start Time: 5:16 PM    History of Present Illness       Reason for visit:  ER follow up for syncope    He eats 2-3 servings of fruits and vegetables daily.He consumes 0 sweetened beverage(s) daily.He exercises with enough effort to increase his heart rate 30 to 60 minutes per day.  He exercises with enough effort to increase his heart  "rate 5 days per week.   He is taking medications regularly.       ED/UC Followup:    Facility:  [facility] ED   Date of visit: 12/6/2024  Reason for visit: syncope   Current Status: \"Better\"           Objective    Vitals - Patient Reported  Weight (Patient Reported): 102.1 kg (225 lb)  Height (Patient Reported): 168.9 cm (5' 6.5\")  BMI (Based on Pt Reported Ht/Wt): 35.77    Video visit for recheck of gabapentin misuse.  Currently taking 8 capsules 3 times per day.  Is taking as prescribed denies any withdrawal effects such as increased anxiety, headaches, dizziness, difficulty sleeping or nausea.  Declines any use of any other illicit substances.  Is using tobacco and marijuana multiple times throughout the day.    Dec 6th Was sitting on a chair and then passed out and fell over. Bit upper lip and lower lip. Was with a friend who said was twitching and that mouth was moving oddly. Thinks injury to lips is due to the fall, but unsure if bit lip.  Did lose control of bladder, not bowels.  After back injury has never lost control of bladder before.     Is going to be getting new going to [place] and is going to be seeing . Has appointment on Friday. Is going TD movements of mouth. This started 2-3 weeks ago.       Physical Exam  Constitutional:       General: He is not in acute distress.     Appearance: Normal appearance. He is not toxic-appearing.   HENT:      Nose: No congestion.   Eyes:      General: Lids are normal.   Pulmonary:      Effort: Pulmonary effort is normal. No tachypnea, bradypnea or respiratory distress.   Skin:     Coloration: Skin is not ashen, cyanotic, jaundiced or pale.   Neurological:      Mental Status: He is alert.   Psychiatric:         Mood and Affect: Mood normal.         Speech: Speech normal.         Behavior: Behavior normal. Behavior is cooperative.              Video-Visit Details    Type of service:  Video Visit   Video End Time:5:32 PM  Originating Location (pt. " Location): Home    Distant Location (provider location):  On-site  Platform used for Video Visit: Evens  Signed Electronically by: BROOKLYN Cruz CNP

## 2024-12-14 ENCOUNTER — APPOINTMENT (OUTPATIENT)
Dept: GENERAL RADIOLOGY | Facility: CLINIC | Age: 34
End: 2024-12-14
Attending: EMERGENCY MEDICINE
Payer: MEDICARE

## 2024-12-14 ENCOUNTER — TELEPHONE (OUTPATIENT)
Dept: BEHAVIORAL HEALTH | Facility: CLINIC | Age: 34
End: 2024-12-14
Payer: MEDICARE

## 2024-12-14 ENCOUNTER — HOSPITAL ENCOUNTER (INPATIENT)
Facility: CLINIC | Age: 34
End: 2024-12-14
Attending: STUDENT IN AN ORGANIZED HEALTH CARE EDUCATION/TRAINING PROGRAM | Admitting: PSYCHIATRY & NEUROLOGY
Payer: MEDICARE

## 2024-12-14 DIAGNOSIS — F39 MOOD DISORDER (H): Primary | ICD-10-CM

## 2024-12-14 DIAGNOSIS — R45.851 SUICIDAL IDEATION: ICD-10-CM

## 2024-12-14 DIAGNOSIS — S99.921S INJURY OF RIGHT FOOT, SEQUELA: ICD-10-CM

## 2024-12-14 DIAGNOSIS — F11.20 OPIOID USE DISORDER, SEVERE, DEPENDENCE (H): ICD-10-CM

## 2024-12-14 DIAGNOSIS — F33.2 MAJOR DEPRESSIVE DISORDER, RECURRENT SEVERE WITHOUT PSYCHOTIC FEATURES (H): ICD-10-CM

## 2024-12-14 DIAGNOSIS — R45.88 NONSUICIDAL SELF-INJURY (H): ICD-10-CM

## 2024-12-14 LAB
ANION GAP SERPL CALCULATED.3IONS-SCNC: 16 MMOL/L (ref 7–15)
BUN SERPL-MCNC: 15.2 MG/DL (ref 6–20)
CALCIUM SERPL-MCNC: 10 MG/DL (ref 8.8–10.4)
CHLORIDE SERPL-SCNC: 101 MMOL/L (ref 98–107)
CREAT SERPL-MCNC: 0.62 MG/DL (ref 0.51–1.17)
EGFRCR SERPLBLD CKD-EPI 2021: >90 ML/MIN/1.73M2
GLUCOSE BLDC GLUCOMTR-MCNC: 149 MG/DL (ref 70–99)
GLUCOSE BLDC GLUCOMTR-MCNC: 97 MG/DL (ref 70–99)
GLUCOSE SERPL-MCNC: 105 MG/DL (ref 70–99)
HCO3 SERPL-SCNC: 20 MMOL/L (ref 22–29)
HOLD SPECIMEN: NORMAL
POTASSIUM SERPL-SCNC: 4 MMOL/L (ref 3.4–5.3)
SODIUM SERPL-SCNC: 137 MMOL/L (ref 135–145)
VIT D+METAB SERPL-MCNC: 24 NG/ML (ref 20–50)

## 2024-12-14 PROCEDURE — 73630 X-RAY EXAM OF FOOT: CPT | Mod: RT

## 2024-12-14 PROCEDURE — 36415 COLL VENOUS BLD VENIPUNCTURE: CPT

## 2024-12-14 PROCEDURE — 99223 1ST HOSP IP/OBS HIGH 75: CPT | Mod: AI | Performed by: CLINICAL NURSE SPECIALIST

## 2024-12-14 PROCEDURE — 250N000013 HC RX MED GY IP 250 OP 250 PS 637

## 2024-12-14 PROCEDURE — 250N000011 HC RX IP 250 OP 636

## 2024-12-14 PROCEDURE — 99223 1ST HOSP IP/OBS HIGH 75: CPT

## 2024-12-14 PROCEDURE — 80048 BASIC METABOLIC PNL TOTAL CA: CPT

## 2024-12-14 PROCEDURE — 250N000013 HC RX MED GY IP 250 OP 250 PS 637: Performed by: CLINICAL NURSE SPECIALIST

## 2024-12-14 PROCEDURE — 93010 ELECTROCARDIOGRAM REPORT: CPT | Performed by: INTERNAL MEDICINE

## 2024-12-14 PROCEDURE — 82306 VITAMIN D 25 HYDROXY: CPT

## 2024-12-14 PROCEDURE — 93005 ELECTROCARDIOGRAM TRACING: CPT

## 2024-12-14 PROCEDURE — 124N000002 HC R&B MH UMMC

## 2024-12-14 PROCEDURE — 99418 PROLNG IP/OBS E/M EA 15 MIN: CPT

## 2024-12-14 RX ORDER — CLONAZEPAM 0.5 MG/1
0.5 TABLET ORAL 2 TIMES DAILY PRN
Status: DISCONTINUED | OUTPATIENT
Start: 2024-12-14 | End: 2024-12-20

## 2024-12-14 RX ORDER — NICOTINE POLACRILEX 4 MG
15-30 LOZENGE BUCCAL
Status: CANCELLED | OUTPATIENT
Start: 2024-12-14

## 2024-12-14 RX ORDER — IBUPROFEN 200 MG
400 TABLET ORAL ONCE
Status: COMPLETED | OUTPATIENT
Start: 2024-12-14 | End: 2024-12-14

## 2024-12-14 RX ORDER — ACETAMINOPHEN 325 MG/1
650 TABLET ORAL EVERY 4 HOURS PRN
Status: DISPENSED | OUTPATIENT
Start: 2024-12-14

## 2024-12-14 RX ORDER — CLINDAMYCIN PHOSPHATE AND BENZOYL PEROXIDE 10; 50 MG/G; MG/G
GEL TOPICAL DAILY
Status: DISCONTINUED | OUTPATIENT
Start: 2024-12-14 | End: 2024-12-15

## 2024-12-14 RX ORDER — NICOTINE POLACRILEX 4 MG
15-30 LOZENGE BUCCAL
Status: ACTIVE | OUTPATIENT
Start: 2024-12-14

## 2024-12-14 RX ORDER — CYCLOBENZAPRINE HCL 5 MG
10 TABLET ORAL EVERY 8 HOURS PRN
Status: DISCONTINUED | OUTPATIENT
Start: 2024-12-14 | End: 2024-12-19

## 2024-12-14 RX ORDER — HYDROXYZINE HYDROCHLORIDE 25 MG/1
25-50 TABLET, FILM COATED ORAL
Status: DISCONTINUED | OUTPATIENT
Start: 2024-12-14 | End: 2024-12-21

## 2024-12-14 RX ORDER — PAROXETINE 20 MG/1
40 TABLET, FILM COATED ORAL EVERY MORNING
Status: DISCONTINUED | OUTPATIENT
Start: 2024-12-14 | End: 2024-12-14

## 2024-12-14 RX ORDER — GABAPENTIN 600 MG/1
600 TABLET ORAL 3 TIMES DAILY
Status: DISCONTINUED | OUTPATIENT
Start: 2024-12-14 | End: 2024-12-14

## 2024-12-14 RX ORDER — DEXTROSE MONOHYDRATE 25 G/50ML
25-50 INJECTION, SOLUTION INTRAVENOUS
Status: ACTIVE | OUTPATIENT
Start: 2024-12-14

## 2024-12-14 RX ORDER — AMLODIPINE BESYLATE 5 MG/1
5 TABLET ORAL DAILY
Status: DISPENSED | OUTPATIENT
Start: 2024-12-15

## 2024-12-14 RX ORDER — CEPHALEXIN 500 MG/1
500 CAPSULE ORAL 2 TIMES DAILY
Status: COMPLETED | OUTPATIENT
Start: 2024-12-14 | End: 2024-12-16

## 2024-12-14 RX ORDER — POLYETHYLENE GLYCOL 3350 17 G/17G
17 POWDER, FOR SOLUTION ORAL DAILY PRN
Status: DISCONTINUED | OUTPATIENT
Start: 2024-12-14 | End: 2024-12-23

## 2024-12-14 RX ORDER — ARIPIPRAZOLE 10 MG/1
10 TABLET ORAL EVERY MORNING
Status: DISCONTINUED | OUTPATIENT
Start: 2024-12-15 | End: 2024-12-16

## 2024-12-14 RX ORDER — MAGNESIUM HYDROXIDE/ALUMINUM HYDROXICE/SIMETHICONE 120; 1200; 1200 MG/30ML; MG/30ML; MG/30ML
30 SUSPENSION ORAL EVERY 4 HOURS PRN
Status: DISPENSED | OUTPATIENT
Start: 2024-12-14

## 2024-12-14 RX ORDER — VALACYCLOVIR HYDROCHLORIDE 500 MG/1
500 TABLET, FILM COATED ORAL DAILY
Status: DISCONTINUED | OUTPATIENT
Start: 2024-12-14 | End: 2024-12-14

## 2024-12-14 RX ORDER — TESTOSTERONE GEL, 1% 10 MG/G
100 GEL TRANSDERMAL DAILY
Status: DISCONTINUED | OUTPATIENT
Start: 2024-12-14 | End: 2024-12-16

## 2024-12-14 RX ORDER — PALIPERIDONE 3 MG/1
3 TABLET, EXTENDED RELEASE ORAL EVERY MORNING
Status: DISCONTINUED | OUTPATIENT
Start: 2024-12-14 | End: 2024-12-16

## 2024-12-14 RX ORDER — VALACYCLOVIR HYDROCHLORIDE 500 MG/1
500 TABLET, FILM COATED ORAL DAILY
Status: DISPENSED | OUTPATIENT
Start: 2024-12-15

## 2024-12-14 RX ORDER — PALIPERIDONE 1.5 MG/1
1.5 TABLET, EXTENDED RELEASE ORAL EVERY MORNING
Status: DISCONTINUED | OUTPATIENT
Start: 2024-12-14 | End: 2024-12-16

## 2024-12-14 RX ORDER — PETROLATUM,WHITE
OINTMENT IN PACKET (GRAM) TOPICAL DAILY PRN
Status: ACTIVE | OUTPATIENT
Start: 2024-12-14

## 2024-12-14 RX ORDER — GABAPENTIN 800 MG/1
4800 TABLET ORAL 3 TIMES DAILY
Status: DISCONTINUED | OUTPATIENT
Start: 2024-12-14 | End: 2024-12-16

## 2024-12-14 RX ORDER — ROSUVASTATIN CALCIUM 20 MG/1
40 TABLET, COATED ORAL DAILY
Status: DISPENSED | OUTPATIENT
Start: 2024-12-15

## 2024-12-14 RX ORDER — GABAPENTIN 800 MG/1
4800 TABLET ORAL 3 TIMES DAILY
Status: DISCONTINUED | OUTPATIENT
Start: 2024-12-14 | End: 2024-12-14

## 2024-12-14 RX ORDER — ONDANSETRON 4 MG/1
4 TABLET, ORALLY DISINTEGRATING ORAL EVERY 8 HOURS PRN
Status: DISPENSED | OUTPATIENT
Start: 2024-12-14

## 2024-12-14 RX ORDER — DEXTROSE MONOHYDRATE 25 G/50ML
25-50 INJECTION, SOLUTION INTRAVENOUS
Status: CANCELLED | OUTPATIENT
Start: 2024-12-14

## 2024-12-14 RX ORDER — PAROXETINE 20 MG/1
20 TABLET, FILM COATED ORAL EVERY MORNING
Status: DISCONTINUED | OUTPATIENT
Start: 2024-12-15 | End: 2024-12-16

## 2024-12-14 RX ADMIN — PALIPERIDONE 3 MG: 3 TABLET, EXTENDED RELEASE ORAL at 10:31

## 2024-12-14 RX ADMIN — GABAPENTIN 4800 MG: 800 TABLET, FILM COATED ORAL at 19:25

## 2024-12-14 RX ADMIN — NICOTINE POLACRILEX 4 MG: 4 GUM, CHEWING BUCCAL at 21:06

## 2024-12-14 RX ADMIN — VALACYCLOVIR 500 MG: 500 TABLET, FILM COATED ORAL at 13:01

## 2024-12-14 RX ADMIN — PALIPERIDONE 1.5 MG: 1.5 TABLET, EXTENDED RELEASE ORAL at 13:01

## 2024-12-14 RX ADMIN — CEPHALEXIN 500 MG: 500 CAPSULE ORAL at 10:30

## 2024-12-14 RX ADMIN — OMEPRAZOLE 20 MG: 20 CAPSULE, DELAYED RELEASE ORAL at 13:02

## 2024-12-14 RX ADMIN — ONDANSETRON 4 MG: 4 TABLET, ORALLY DISINTEGRATING ORAL at 12:14

## 2024-12-14 RX ADMIN — CLONAZEPAM 0.5 MG: 0.5 TABLET ORAL at 13:00

## 2024-12-14 RX ADMIN — GABAPENTIN 4800 MG: 800 TABLET, FILM COATED ORAL at 14:15

## 2024-12-14 RX ADMIN — IBUPROFEN 400 MG: 200 TABLET, FILM COATED ORAL at 13:00

## 2024-12-14 RX ADMIN — ACETAMINOPHEN 650 MG: 325 TABLET, FILM COATED ORAL at 21:06

## 2024-12-14 RX ADMIN — NICOTINE POLACRILEX 4 MG: 4 GUM, CHEWING BUCCAL at 14:24

## 2024-12-14 RX ADMIN — DICLOFENAC SODIUM TOPICAL GEL, 1% 2 G: 10 GEL TOPICAL at 19:27

## 2024-12-14 RX ADMIN — HYDROXYZINE HYDROCHLORIDE 50 MG: 25 TABLET ORAL at 17:11

## 2024-12-14 RX ADMIN — NICOTINE POLACRILEX 4 MG: 4 GUM, CHEWING BUCCAL at 13:00

## 2024-12-14 RX ADMIN — NICOTINE POLACRILEX 4 MG: 4 GUM, CHEWING BUCCAL at 18:45

## 2024-12-14 RX ADMIN — NICOTINE POLACRILEX 4 MG: 4 GUM, CHEWING BUCCAL at 17:11

## 2024-12-14 RX ADMIN — CEPHALEXIN 500 MG: 500 CAPSULE ORAL at 19:25

## 2024-12-14 RX ADMIN — PAROXETINE HYDROCHLORIDE 40 MG: 20 TABLET, FILM COATED ORAL at 10:30

## 2024-12-14 ASSESSMENT — ACTIVITIES OF DAILY LIVING (ADL)
ADLS_ACUITY_SCORE: 48
WALKING_OR_CLIMBING_STAIRS_DIFFICULTY: YES
TOILETING_ISSUES: NO
DIFFICULTY_COMMUNICATING: NO
ADLS_ACUITY_SCORE: 48
DRESSING/BATHING_DIFFICULTY: NO
WEAR_GLASSES_OR_BLIND: YES
ADLS_ACUITY_SCORE: 48
WALKING_OR_CLIMBING_STAIRS: OTHER (SEE COMMENTS)
CHANGE_IN_FUNCTIONAL_STATUS_SINCE_ONSET_OF_CURRENT_ILLNESS/INJURY: NO
DOING_ERRANDS_INDEPENDENTLY_DIFFICULTY: NO
CONCENTRATING,_REMEMBERING_OR_MAKING_DECISIONS_DIFFICULTY: NO
ADLS_ACUITY_SCORE: 48
HEARING_DIFFICULTY_OR_DEAF: NO
FALL_HISTORY_WITHIN_LAST_SIX_MONTHS: NO
ADLS_ACUITY_SCORE: 48
ADLS_ACUITY_SCORE: 68
VISION_MANAGEMENT: GLASSES
ADLS_ACUITY_SCORE: 48
DIFFICULTY_EATING/SWALLOWING: NO
ADLS_ACUITY_SCORE: 48

## 2024-12-14 NOTE — PHARMACY-ADMISSION MEDICATION HISTORY
Please see admission medication reconciliation note placed 12/13/2024 for information regarding prior to admission medications.    Ruby Brown, PharmD  PGY-2 Behavioral Health Pharmacy Resident  [facility] ([facility])  Available on [employer]

## 2024-12-14 NOTE — CONSULTS
[facility]  Consult Note - Hospitalist Service  Date of Admission:  12/14/2024  Consult Requested by: psych  Reason for Consult: high BP's, diabetes management, and has stitches s/p self cutting with prophylactic abx regimen, foot pain.      Assessment & Plan   Prakash Prasad is a 34 year old adult admitted on 12/14/2024. He is a transgender male (he/him pronouns) who has previous history of T2DM, HTN, Bipolar Type I disorder, borderline personality disorder,  admitted from the ER on 12/13/2024 due to concern for SIB in the context of  financial instability and poor coping mechanism. Medicine consulted for elevated BPs, DM management and evaluation of wound from self harm with sutures from OSH.    Self-inflicted injury of left forearm s/p sutures 12/13  Patient was trying to see a behavioral health provider but they did not have an appointment so he stabbed himself in the left forearm multiple times. He specifically stated he was not trying to kill himself, he was just managing his frustration. Tetanus UTD. Sutured in ED -- 3x 0.5-1.0cm lacerations with 4-0 Nylon.  -- Keflex 500mg BID x3 days (12/13-12/16)  -- Keep wounds clean, may apply prn Vaseline once daily if become dry/itchy with healing  -- Sutures to be removed in 10-14 days pending assessment, approx 12/23-12/27; if discharged prior to this, follow up with PCP or urgent care of assistance with removal.    HTN  Hx of hypertension noted with patient previously on Amlodipine 5mg that was increased to 10mg in 10/2024 during admission; appears this was reduced back to 5mg on discharge. /100 this morning with repeat after a couple hours 128/91 after receiving scheduled Amlodipine.  -- Continue Amlodipine 5mg daily  -- Monitor BP; notify medicine if BP consistently >170 or >100 for consideration of dose increase otherwise would defer to PCP for ongoing management    T2DM  A1C on 10/2/24 7.4. OP Regimen includes  Jardiance 10mg daily and Semaglutide 7mg daily. Did not tolerate Metformin previously. Patient previously on Lantus though has not been on for several months. Gluc this morning 97.   -- Continue PTA Jardiance  -- Continue PTA Semaglutide PO daily  -- BID glucs; pending trend will add sliding scale insulin   -- Hypoglycemia protocol  -- Follow up with PCP for planned DM follow up in a few weeks    Gabapentin Overdose with current taper  Patient has history of taking significant amounts of Gabapentin (18,000mg daily at max dosing) due to obtaining Rx from multiple providers. They are currently on a taper and down to 4800mg TID dosing. Taper currently on pause at current dose due to concern for seizure at the beginning of this month. Discussed with Psych Nurse Practitioner Earle Mancini and Clinical Pharmacist that this is the correct dosing. They are currently tolerating this dose well with no signs of oversedation.  -- Continue 4800mg Gabapentin TID per outpatient taper plan  -- Follow up with Neurology as planned OP for consideration of further taper    Right foot pain s/p kicking metal door  Patient reports kicking door last night followed by persistent forefoot pain. Pain with walking, trying to limit weight bearing on the foot. XR in ED without fracture, known bone spurs present. Patient has full ROM and sensation to the foot, pain worse on palpation  -- Tylenol PRN   -- Heat/ice prn  -- Encouraged elevating the foot above the level of the heart as much as possible  -- Pain will likely take 1-3 weeks to fully improve    Transgender person, on masculinizing hormones   -- Continue PTA Testosterone topical daily    GERD  Family hx of esophageal cancer  -- Continue PTA PPI  -- Encourage patient to follow up with upper endoscopy ordered by his PCP on 11/20/24        The patient's care was discussed with the Primary Team via this note      Clinically Significant Risk Factors Present on Admission                   #  "Hypertension: Noted on problem list          # DMII: A1C = 7.4 % (Ref range: 0.0 - 5.6 %) within past 6 months    # Obesity: Estimated body mass index is 34.56 kg/m  as calculated from the following:    Height as of 12/13/24: 1.689 m (5' 6.5\").    Weight as of this encounter: 98.6 kg (217 lb 6.4 oz).       # Financial/Environmental Concerns:         Medicine team will continue to follow peripherally for BG trend.   Please reach out to medicine if patient remains inpatient when sutures are due to be removed around 12/23-12/27 for assessment and removal    Evie Hill PA-C  Hospitalist Service  Securely message with [employer] (more info)  Text page via [facility] Paging/Directory   ______________________________________________________________________    Chief Complaint   Foot pain, DM, HTN    History is obtained from the patient and chart review    History of Present Illness   Prakash Prasad is a 34 year old adult admitted on 12/14/2024. He is a transgender male (he/him pronouns) who has previous history of T2DM, HTN, Bipolar Type I disorder, borderline personality disorder,  admitted from the ER on 12/13/2024 due to concern for SIB in the context of  financial instability and poor coping mechanism. Medicine consulted for elevated BPs, DM management and evaluation of wound from self harm with sutures from OSH.    Patient seen in their room with bedside attendant in place. They report right foot pain that has been persistent since kicking door last night. No numbness/tingling, no weakness. Have been trying to limit walking/bearing weight on the foot. No CP, SOB, lightheadedness. Mild pain at left forearm suture sites.      Past Medical History    Past Medical History:   Diagnosis Date    ADHD (attention deficit hyperactivity disorder)     Bipolar 1 disorder     Borderline personality disorder     Cauda equina syndrome     Chronic low back pain     Depression     Diabetes mellitus, type 2 (H) 1/19/2023    GERD " (gastroesophageal reflux disease)     h/o TBI (traumatic brain injury)     Hypertension, unspecified type 12/16/2021    Marginal corneal ulcer, left 07/17/2015    Nephrolithiasis     obesity     Polysubstance abuse - methamphetamine, hallucinagen, heroin, marijuana     currently in remission    PONV (postoperative nausea and vomiting)     PTSD (post-traumatic stress disorder)        Past Surgical History   Past Surgical History:   Procedure Laterality Date    BACK SURGERY  12/24/2016    BACK SURGERY - For Cauda Equina Syndrome  12/24/2016    COLONOSCOPY      COMBINED CYSTOSCOPY, INSERT STENT URETER(S) Left 8/30/2018    Procedure: COMBINED CYSTOSCOPY, INSERT STENT URETER(S);  Cystoscopy With Left Stent Placement;  Surgeon: Kiran Ulrich MD;  Location: [facility] OR    COMBINED CYSTOSCOPY, RETROGRADES, EXCHANGE STENT URETER(S) Left 10/14/2018    Procedure: COMBINED CYSTOSCOPY, RETROGRADES, EXCHANGE STENT URETER(S);  Cystoscopy and removal of left-sided stent.;  Surgeon: Stiven Olivo MD;  Location:  OR    COMBINED CYSTOSCOPY, RETROGRADES, URETEROSCOPY, INSERT STENT  1/6/2014    Procedure: COMBINED CYSTOSCOPY, RETROGRADES, URETEROSCOPY, INSERT STENT;  Cystyoscopy place left ureteral stent;  Surgeon: Jaun Kimble MD;  Location: [facility] OR    COMBINED CYSTOSCOPY, RETROGRADES, URETEROSCOPY, INSERT STENT Left 10/23/2018    Procedure: Video cystoscopy, left ureteroscopy with stone extraction;  Surgeon: Bull Mast MD;  Location:  OR    CYSTOSCOPY, URETEROSCOPY, COMBINED Right 9/23/2015    Procedure: COMBINED CYSTOSCOPY, URETEROSCOPY;  Surgeon: ROME Jett MD;  Location: [facility] OR    ENT SURGERY      ESOPHAGOSCOPY, GASTROSCOPY, DUODENOSCOPY (EGD), COMBINED  4/8/2013    Procedure: COMBINED ESOPHAGOSCOPY, GASTROSCOPY, DUODENOSCOPY (EGD), BIOPSY SINGLE OR MULTIPLE;  Gastroscopy;  Surgeon: Peter Pickard MD;  Location: [facility] GI    ESOPHAGOSCOPY, GASTROSCOPY, DUODENOSCOPY (EGD), COMBINED Left  10/28/2014    Procedure: COMBINED ESOPHAGOSCOPY, GASTROSCOPY, DUODENOSCOPY (EGD), BIOPSY SINGLE OR MULTIPLE;  Surgeon: Narcisa Ramirez MD;  Location:  OR    ESOPHAGOSCOPY, GASTROSCOPY, DUODENOSCOPY (EGD), COMBINED N/A 12/24/2018    Procedure: COMBINED ESOPHAGOSCOPY, GASTROSCOPY, DUODENOSCOPY (EGD), BIOPSY SINGLE OR MULTIPLE;  Surgeon: Sonu Verduzco MD;  Location: [facility] GI    INJECT EPIDURAL TRANSFORAMINAL LUMBAR / SACRAL EA ADDITIONAL LEVEL Left 3/18/2021    Procedure: Left L5/S1 transforaminal injection for selective L5 nerve root block;  Surgeon: Eliza Pagan MD;  Location: [facility] OR    LAPAROSCOPIC CHOLECYSTECTOMY  11/20/2014    [facility], Dr. Ramirez    LASER HOLMIUM LITHOTRIPSY URETER(S), INSERT STENT, COMBINED  4/2/2014    Procedure: COMBINED CYSTOSCOPY, URETEROSCOPY, LASER HOLMIUM LITHOTRIPSY URETER(S), INSERT STENT;  Cystoscopy,Left Ureteral Stent Removal,Left Ureteroscopy with Laser Lithotripsy,Left Ureter Stent Placement;  Surgeon: ROME Jett MD;  Location: [facility] OR    Transurethral stone resection  03/11/2011       Medications   I have reviewed this patient's current medications       Review of Systems    The 10 point Review of Systems is negative other than noted in the HPI or here.      Physical Exam   Vital Signs: Temp: 97.6  F (36.4  C) Temp src: Oral BP: (!) 154/100 Pulse: 115   Resp: 18 SpO2: 98 % O2 Device: None (Room air)    Weight: 217 lbs 6.4 oz    Physical Exam  Vitals reviewed.   Constitutional:       General: He is not in acute distress.     Appearance: He is not diaphoretic.   Cardiovascular:      Rate and Rhythm: Normal rate and regular rhythm.      Heart sounds: No murmur heard.  Pulmonary:      Effort: Pulmonary effort is normal.      Breath sounds: No wheezing, rhonchi or rales.   Musculoskeletal:      Comments: Right foot mildly swollen, normal ROM and sensation, mild bruising and lateral dorsal aspect of foot. TTP along dorsal forefoot.   Skin:     General: Skin is warm  and dry.      Comments: Three sutured sites and one open laceration site at left forearm. Sutures a clean and dry, mild surrounding erythema in shape of bandage, no drainage or increased warmth.    Neurological:      General: No focal deficit present.      Mental Status: He is alert and oriented to person, place, and time.           Medical Decision Making       115 MINUTES SPENT BY ME on the date of service doing chart review, history, exam, documentation & further activities per the note.      Data     I have personally reviewed the following data over the past 24 hrs:    N/A  \   N/A   / N/A     137 101 15.2 /  105 (H)   4.0 20 (L) 0.62 \       Imaging results reviewed over the past 24 hrs:   Recent Results (from the past 24 hours)   Foot  XR, G/E 3 views, right    Narrative    EXAM: XR FOOT RIGHT G/E 3 VIEWS  LOCATION: [facility]  DATE: 12/14/2024    INDICATION: Foot pain, trauma.  COMPARISON: None.      Impression    IMPRESSION: Negative for fracture or dislocation. Small plantar and Achilles calcaneal spurs. Otherwise unremarkable.

## 2024-12-14 NOTE — H&P
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    Polysubstance abuse (H)    GERD (gastroesophageal reflux disease)    Tobacco abuse    Intractable back pain    Optic neuritis    Cauda equina syndrome with neurogenic bladder (H)    Schizoaffective disorder, bipolar type (H)    PTSD (post-traumatic stress disorder)    Anxiety    Auditory hallucination    Class 2 obesity due to excess calories without serious comorbidity with body mass index (BMI) of 36.0 to 36.9 in adult    Nephrolithiasis    Cannabis use disorder, severe, dependence (H)    Depression    History of heroin abuse (H)    Opioid use disorder, severe, dependence (H)    Substance-induced psychotic disorder with hallucinations (H)    Nausea    Urinary retention    Chronic bilateral low back pain without sciatica    AVA (generalized anxiety disorder)    Lumbosacral radiculopathy at L5    Abnormal uterine bleeding    Bipolar affective disorder, mixed, severe, with psychotic behavior (H)    Akathisia    Hypertension, unspecified type    Female-to-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 person     Morbid obesity (H)    Mood disorder due to a general medical condition    Acne vulgaris    Type 2 diabetes mellitus with hyperglycemia, with long-term current use of insulin (H)    Herpes labialis    Major depressive disorder, 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          PLAN   Target psychiatric symptoms and interventions:  Education:  Risks, benefits, and alternatives discussed at length with patient.     Admit to Unit:  [address]  Attending Physician: Christiana BRADLEY. Patient will be seen by staff psychiatrist.  Legal Status: voluntary  Reason for Admit: poses an imminent risk to self    Safety/Structure/Supervision  Precautions placed:  voluntary. Code 1, 15 min checks, suicide/assault/self-injurious behavior/withdrawal/elopement precautions.  Monitor target symptoms.   Provide a safe environment and therapeutic milieu.  Continue precautions as noted above    Medications-PTA: 12/14/2024: PTA medications reviewed.  Outpatient medications were continued with the exception of Testosterone (Androgel/testim.    Gabapentin (Neurontin) tablet 4000) milligram oral 3 times daily  Omeprazole (Prilosec) CR capsule 20 mg oral daily  Paliperidone (Invega) 24-hour tablet 1.5 mg oral every morning  Paliperidone ER (Invega) 24-hour tablet 3 mg oral every morning  Paroxetine (Paxil) tablet 20 mg oral every morning first dose after modification on Sunday, 12/15/2024  Rosuvastatin (Crestor) tablet 40 mg oral daily  Semaglutide (Rybelsus) tablet 7 mg oral daily  Sertraline (Zoloft) tablet 50 mg oral daily first dose on Tuesday, 12/17/2024 at 8:00  Valacyclovir (Valtrex) tablet 500 mg 500 mg oral  daily  Aluminum mag hydroxide-simethicone (Maalox 30 mL oral every 4 hours as needed indigestion  Clonazepam (Klonopin) tablet 0.5 mg oral 2 times daily as needed anxiety  Cyclobenzaprine (Flexeril) tablet 10 mg oral every 8 hours as needed muscle spasm  Diclofenac (Voltaren) 1% topical gel 2 g 4 times daily as needed inflammatory pain  Hydroxyzine HCl (Atarax) tablet 25 to 50 mg oral at bedtime as needed may repeat x 1 for anxiety and difficulty sleeping  Melatonin tablet 3 mg oral at bedtime as needed sleep  Full code  Voluntary  Amlodipine (Norvasc) tablet 5 mg oral daily  Aripiprazole (Abilify) tablet 10 mg oral every morning  Cephalexin (Keflex) capsule 500 mg routine oral 2 times daily    Medications-Hospital/ED:   Emergency medication with  PRN severe agitation ordered.    Acute Medical Problems and Treatments:  Consults:  Internal Medicine Consult placed.  Routine lab studies have been requested.   Routine labs have been ordered including CMP, CBC and diff, TSH, folate, vitamin B12, Tier 1 and 2 First Episode labs, and fasting lipid panel.  Regular diet adult.    Full code  Glucose monitor nursing POCT  Notify provider if severe hypoglycemic episode  Vital signs and pain assessment  Weight patient, routine every TU, TH, SA,    Behavioral/Psychological:  Encourage unit programming  Continue programming  Status 15    :  Barriers to Discharge: Symptom stabilization  Referrals: CTC to facilitate all referrals  Care Coordination: CTC to coordinate care with outside providers  Placement:  home with self-care  Anticipated Discharge: 7 to 10 days    .  Continuous treatment planning to be performed during hospital course as per routine.        Risk Assessment: [facility] RISK ASSESSMENT: Patient on precautions and not sena for safety at this time    This note was created with help of Dragon dictation system. Grammatical / typing errors are not intentional.    BROOKLYN Frank CNP  Psychiatry    [facility]       CERTIFICATION   Initial Certification I certify that the inpatient psychiatric facility admission was medically necessary for treatment which could   reasonably be expected to improve the patient s condition.                                       I estimate 7 to 10 days days of hospitalization is necessary for proper treatment of the patient. My plans for post-hospital care for this patient are home with self-care.                                       BROOKLYN Frank CNP     -     12/14/2024     -     11:48 AM

## 2024-12-14 NOTE — PLAN OF CARE
12/14/24 1033   Patient Belongings   Did you bring any home meds/supplements to the hospital?  No   Patient Belongings locker   Patient Belongings Put in Hospital Secure Location (Security or Locker, etc.) cell phone/electronics;clothing;keys;plastic bag   Belongings Search Yes   Clothing Search Yes   Second Staff Sherley A     Goal Outcome Evaluation:       Grey adida's shoes, pink vape, [place] jersey, green socks, Iphone with , [place] hat, jeans, black shirt,    In backpack: keys, nintendo switch, 2 pens, red envelope, airpods with case, cotton swabs, black , box of markers, ps4 controller, 2 notebooks, 1 calendar planner, 11 magazines, 4 books, vaseline, socks, pencils, 2 pieces of nictotine gum, 4pm.    A               Admission:  I am responsible for any personal items that are not sent to the safe or pharmacy.  [facility] is not responsible for loss, theft or damage of any property in my possession.    Signature:  _________________________________ Date: _______  Time: _____                                              Staff Signature:  ____________________________ Date: ________  Time: _____      2nd Staff person, if patient is unable/unwilling to sign:    Signature: ________________________________ Date: ________  Time: _____     Discharge:  [facility] has returned all of my personal belongings:    Signature: _________________________________ Date: ________  Time: _____                                          Staff Signature:  ____________________________ Date: ________  Time: _____

## 2024-12-14 NOTE — PLAN OF CARE
Goal Outcome Evaluation:    Problem: Adult Behavioral Health Plan of Care  Goal: Plan of Care Review  Outcome: Progressing     Problem: Nonsuicidal Self-Injury  Goal: Improved Behavior Regulation and Impulse Control (Nonsuicidal Self-Injury)  Outcome: Progressing     Problem: Adult Behavioral Health Plan of Care  Goal: Adheres to Safety Considerations for Self and Others  Outcome: Progressing     Pt arrives on unit at approx 0940. Pt is coop with search. He is on a 1:1 for recent self-injury. Pt denies depression but states that anxiety is moderate.     Pt has right foot pain rated a 4 secondary to kicking a door in the [facility] ED. Xray negative for fracture. Pt has three sutures in individual lacerations on left forearm due to self-harm.    Affect is blunted, calm, polite. Pt is seen by psychiatry and internal medicine. He has been resting in his room for last two hours of this shift.     Report given to evening shift.    Luz Diaz RN

## 2024-12-14 NOTE — TELEPHONE ENCOUNTER
02:12 - Per ANS Angela, unit is not staffed appropriately to take admission tonight. Plan for day shift transfer pending staffing  02:32 - Updated ED RN on POC

## 2024-12-15 LAB
ALBUMIN SERPL BCG-MCNC: 4.6 G/DL (ref 3.5–5.2)
ALP SERPL-CCNC: 80 U/L (ref 40–150)
ALT SERPL W P-5'-P-CCNC: 38 U/L (ref 0–70)
ANION GAP SERPL CALCULATED.3IONS-SCNC: 14 MMOL/L (ref 7–15)
AST SERPL W P-5'-P-CCNC: 31 U/L (ref 0–45)
BASOPHILS # BLD AUTO: 0 10E3/UL (ref 0–0.2)
BASOPHILS NFR BLD AUTO: 1 %
BILIRUB SERPL-MCNC: 0.4 MG/DL
BUN SERPL-MCNC: 19.1 MG/DL (ref 6–20)
CALCIUM SERPL-MCNC: 10 MG/DL (ref 8.8–10.4)
CHLORIDE SERPL-SCNC: 102 MMOL/L (ref 98–107)
CHOLEST SERPL-MCNC: 154 MG/DL
CREAT SERPL-MCNC: 0.65 MG/DL (ref 0.51–1.17)
EGFRCR SERPLBLD CKD-EPI 2021: >90 ML/MIN/1.73M2
EOSINOPHIL # BLD AUTO: 0.2 10E3/UL (ref 0–0.7)
EOSINOPHIL NFR BLD AUTO: 2 %
ERYTHROCYTE [DISTWIDTH] IN BLOOD BY AUTOMATED COUNT: 14.5 % (ref 10–15)
FOLATE SERPL-MCNC: 13.1 NG/ML (ref 4.6–34.8)
GLUCOSE BLDC GLUCOMTR-MCNC: 130 MG/DL (ref 70–99)
GLUCOSE BLDC GLUCOMTR-MCNC: 135 MG/DL (ref 70–99)
GLUCOSE SERPL-MCNC: 152 MG/DL (ref 70–99)
HCO3 SERPL-SCNC: 22 MMOL/L (ref 22–29)
HCT VFR BLD AUTO: 48.9 % (ref 35–53)
HDLC SERPL-MCNC: 43 MG/DL
HGB BLD-MCNC: 16.7 G/DL (ref 11.7–17.7)
IMM GRANULOCYTES # BLD: 0 10E3/UL
IMM GRANULOCYTES NFR BLD: 0 %
LDLC SERPL CALC-MCNC: 59 MG/DL
LYMPHOCYTES # BLD AUTO: 2 10E3/UL (ref 0.8–5.3)
LYMPHOCYTES NFR BLD AUTO: 28 %
MCH RBC QN AUTO: 28.4 PG (ref 26.5–33)
MCHC RBC AUTO-ENTMCNC: 34.2 G/DL (ref 31.5–36.5)
MCV RBC AUTO: 83 FL (ref 78–100)
MONOCYTES # BLD AUTO: 0.4 10E3/UL (ref 0–1.3)
MONOCYTES NFR BLD AUTO: 6 %
NEUTROPHILS # BLD AUTO: 4.7 10E3/UL (ref 1.6–8.3)
NEUTROPHILS NFR BLD AUTO: 64 %
NONHDLC SERPL-MCNC: 111 MG/DL
NRBC # BLD AUTO: 0 10E3/UL
NRBC BLD AUTO-RTO: 0 /100
PLATELET # BLD AUTO: 259 10E3/UL (ref 150–450)
POTASSIUM SERPL-SCNC: 4 MMOL/L (ref 3.4–5.3)
PROT SERPL-MCNC: 8 G/DL (ref 6.4–8.3)
RBC # BLD AUTO: 5.87 10E6/UL (ref 3.8–5.9)
SODIUM SERPL-SCNC: 138 MMOL/L (ref 135–145)
TRIGL SERPL-MCNC: 260 MG/DL
VIT B12 SERPL-MCNC: 807 PG/ML (ref 232–1245)
WBC # BLD AUTO: 7.3 10E3/UL (ref 4–11)

## 2024-12-15 PROCEDURE — 250N000013 HC RX MED GY IP 250 OP 250 PS 637

## 2024-12-15 PROCEDURE — 85004 AUTOMATED DIFF WBC COUNT: CPT

## 2024-12-15 PROCEDURE — 36415 COLL VENOUS BLD VENIPUNCTURE: CPT

## 2024-12-15 PROCEDURE — 80061 LIPID PANEL: CPT

## 2024-12-15 PROCEDURE — 250N000013 HC RX MED GY IP 250 OP 250 PS 637: Performed by: CLINICAL NURSE SPECIALIST

## 2024-12-15 PROCEDURE — 124N000002 HC R&B MH UMMC

## 2024-12-15 PROCEDURE — 82607 VITAMIN B-12: CPT

## 2024-12-15 PROCEDURE — 82565 ASSAY OF CREATININE: CPT

## 2024-12-15 PROCEDURE — 250N000012 HC RX MED GY IP 250 OP 636 PS 637

## 2024-12-15 PROCEDURE — 250N000013 HC RX MED GY IP 250 OP 250 PS 637: Performed by: PSYCHIATRY & NEUROLOGY

## 2024-12-15 PROCEDURE — 82746 ASSAY OF FOLIC ACID SERUM: CPT

## 2024-12-15 RX ORDER — CLINDAMYCIN PHOSPHATE 10 MG/G
GEL TOPICAL DAILY
Status: DISPENSED | OUTPATIENT
Start: 2024-12-16

## 2024-12-15 RX ORDER — NICOTINE 21 MG/24HR
1 PATCH, TRANSDERMAL 24 HOURS TRANSDERMAL DAILY
Status: DISPENSED | OUTPATIENT
Start: 2024-12-15

## 2024-12-15 RX ORDER — IBUPROFEN 600 MG/1
600 TABLET, FILM COATED ORAL
Status: COMPLETED | OUTPATIENT
Start: 2024-12-15 | End: 2024-12-15

## 2024-12-15 RX ORDER — BENZOYL PEROXIDE 5 G/100G
GEL TOPICAL DAILY
Status: DISPENSED | OUTPATIENT
Start: 2024-12-16

## 2024-12-15 RX ADMIN — ACETAMINOPHEN 650 MG: 325 TABLET, FILM COATED ORAL at 16:06

## 2024-12-15 RX ADMIN — CLONAZEPAM 0.5 MG: 0.5 TABLET ORAL at 09:28

## 2024-12-15 RX ADMIN — NICOTINE 1 PATCH: 21 PATCH, EXTENDED RELEASE TRANSDERMAL at 14:46

## 2024-12-15 RX ADMIN — GABAPENTIN 4800 MG: 800 TABLET, FILM COATED ORAL at 13:00

## 2024-12-15 RX ADMIN — NICOTINE POLACRILEX 4 MG: 4 GUM, CHEWING BUCCAL at 09:33

## 2024-12-15 RX ADMIN — NICOTINE POLACRILEX 4 MG: 4 GUM, CHEWING BUCCAL at 12:36

## 2024-12-15 RX ADMIN — NICOTINE POLACRILEX 4 MG: 4 GUM, CHEWING BUCCAL at 16:55

## 2024-12-15 RX ADMIN — INSULIN GLARGINE 5 UNITS: 100 INJECTION, SOLUTION SUBCUTANEOUS at 14:01

## 2024-12-15 RX ADMIN — NICOTINE POLACRILEX 4 MG: 4 GUM, CHEWING BUCCAL at 11:34

## 2024-12-15 RX ADMIN — ARIPIPRAZOLE 10 MG: 10 TABLET ORAL at 07:48

## 2024-12-15 RX ADMIN — ROSUVASTATIN 40 MG: 20 TABLET, FILM COATED ORAL at 07:47

## 2024-12-15 RX ADMIN — DICLOFENAC SODIUM TOPICAL GEL, 1% 2 G: 10 GEL TOPICAL at 10:17

## 2024-12-15 RX ADMIN — AMLODIPINE BESYLATE 5 MG: 5 TABLET ORAL at 07:56

## 2024-12-15 RX ADMIN — ACETAMINOPHEN 650 MG: 325 TABLET, FILM COATED ORAL at 07:47

## 2024-12-15 RX ADMIN — GABAPENTIN 4800 MG: 800 TABLET, FILM COATED ORAL at 07:49

## 2024-12-15 RX ADMIN — GABAPENTIN 4800 MG: 800 TABLET, FILM COATED ORAL at 19:19

## 2024-12-15 RX ADMIN — NICOTINE POLACRILEX 4 MG: 4 GUM, CHEWING BUCCAL at 14:44

## 2024-12-15 RX ADMIN — PALIPERIDONE 3 MG: 3 TABLET, EXTENDED RELEASE ORAL at 07:48

## 2024-12-15 RX ADMIN — NICOTINE POLACRILEX 4 MG: 4 GUM, CHEWING BUCCAL at 07:47

## 2024-12-15 RX ADMIN — NICOTINE POLACRILEX 4 MG: 4 GUM, CHEWING BUCCAL at 19:19

## 2024-12-15 RX ADMIN — PALIPERIDONE 1.5 MG: 1.5 TABLET, EXTENDED RELEASE ORAL at 07:50

## 2024-12-15 RX ADMIN — CLONAZEPAM 0.5 MG: 0.5 TABLET ORAL at 14:43

## 2024-12-15 RX ADMIN — VALACYCLOVIR 500 MG: 500 TABLET, FILM COATED ORAL at 07:47

## 2024-12-15 RX ADMIN — IBUPROFEN 600 MG: 600 TABLET, FILM COATED ORAL at 11:34

## 2024-12-15 RX ADMIN — PAROXETINE HYDROCHLORIDE 20 MG: 20 TABLET, FILM COATED ORAL at 07:48

## 2024-12-15 RX ADMIN — CEPHALEXIN 500 MG: 500 CAPSULE ORAL at 19:20

## 2024-12-15 RX ADMIN — CEPHALEXIN 500 MG: 500 CAPSULE ORAL at 07:48

## 2024-12-15 RX ADMIN — EMPAGLIFLOZIN 10 MG: 10 TABLET, FILM COATED ORAL at 07:51

## 2024-12-15 RX ADMIN — OMEPRAZOLE 20 MG: 20 CAPSULE, DELAYED RELEASE ORAL at 07:48

## 2024-12-15 RX ADMIN — HYDROXYZINE HYDROCHLORIDE 50 MG: 25 TABLET ORAL at 19:19

## 2024-12-15 ASSESSMENT — ACTIVITIES OF DAILY LIVING (ADL)
ADLS_ACUITY_SCORE: 48

## 2024-12-15 NOTE — PLAN OF CARE
"Goal Outcome Evaluation:    Problem: Adult Behavioral Health Plan of Care  Goal: Plan of Care Review  Outcome: Progressing     Problem: Adult Behavioral Health Plan of Care  Goal: Adheres to Safety Considerations for Self and Others  Outcome: Progressing     Problem: Nonsuicidal Self-Injury  Goal: Improved Behavior Regulation and Impulse Control (Nonsuicidal Self-Injury)  Outcome: Progressing       Pt was hyperverbal and intrusive at start of shift. \"I have to call my sponsor now. Did you know that I need to call my sponsor every four to six hours and [place] so they're [place] hour ahead. I'm already late for my call!\" Staff reiterate that phones will not be turned on until 8 AM. Pt yells briefly \"Suck my arina!\" She slams her door and remains in her room for a period of time. When pt returns to desk she states: \"Give me all of my meds including the Zyprexa which I hate.\"     Pt has been more calm, rational, redirects more easily. She has been resting in her room this morning, out in milieu intermittently and more appropriate with requests.     Luz Diaz RN                       "

## 2024-12-15 NOTE — PROGRESS NOTES
"Pt asks to see writer privately and tells me that he would like \"to sign a twelve hour intent and go home and kill myself. I just can't take it here any longer. I just don't want to exist anymore.\" Writer reassures and empathizes with pt. We discuss distractions and pt states that he would like Pokemon coloring sheets. Writer provides these. Pt also plays a game with [facility] staff and remains in [facility] per my suggestion. He intermittently listens to headphones.     Pt requests second prn dose of klonopin and this is administered at 1443 after reviewing with Dr. Wu.     Pt remains in [facility] area at this time accompanied by [facility] staff.    Luz Diaz RN    "

## 2024-12-15 NOTE — PLAN OF CARE
Pt appeared to be sleeping most of the night with SIO staff at bedside to monitor for self-injury and unsafe behavior.  No behavioral concerns reported by staff this shift. Approximately 7 hours of sleep is recorded.

## 2024-12-15 NOTE — PLAN OF CARE
Problem: Anxiety Signs/Symptoms  Goal: Optimized Energy Level (Anxiety Signs/Symptoms)  Outcome: Progressing   Goal Outcome Evaluation:    Pt was intermittently visible in the milieu, Pt presented with flat affect, he endorsed anxiety, depression, SI with no intent. Endorsed voices telling him to hang himself on the balcony, and foot pain. Pt stated that the foot is more painful when he puts pressure/weight on it. Denies HI/SIB and contracted for safety. Ate adequately for dinner. Medication compliant, prn hydroxyzine give with minimal effect. Plan of care ongoing, will continue to monitor.       Vital signs:  Temp: 98.2  F (36.8  C) Temp src: Temporal BP: 128/86 Pulse: 96   Resp: 16 SpO2: 96 % O2 Device: None (Room air)

## 2024-12-15 NOTE — PROGRESS NOTES
Brief Medicine Progress Note    Patient's PTA Rybelsus is not available in our formulary, currently not able to obtain patients home Rx. Previously had been started on Lantus in it's place during other admission.    Recent Labs   Lab 12/15/24  0803 12/15/24  0750 12/14/24  2115 12/14/24  1253 12/14/24  1156 12/13/24  2058   * 152* 149* 105* 97 106*         Assessment/Plan  - Substitute Lantus 5 mg in place of PTA Rybelsus  -- Continue to monitor BG trend    Medicine team will continue to follow peripherally for BG trend and consideration of sliding scale insulin      Evie Hill PA-C, [facility]  Hospitalist Service   [facility]  Securely message with Vocera   See AMCOM signed in provider for up to date coverage information

## 2024-12-15 NOTE — DISCHARGE INSTRUCTIONS
Behavioral Discharge Planning and Instructions    Summary: You were admitted on 12/14/2024  due to {Mental Health 1:288295}.  You were treated by {Adult  Providers:238407} and discharged on *** from {Inpatient Mental Health Units:699312} to {[facility] D/C Locations:281918}    Main Diagnosis: ***    Commitment:  {CommitmentAVH:116107}    Health Care Follow-up:   Appointment Date/Time: ***   Psychiatrist/Primary Care Giver: ***   Address: ***   Phone Number: ***    Appointment Date/Time: ***   Therapist: ***   Address: ***   Phone Number: ***    Appointment Date/Time: ***   Support Group: ***   Address: ***   Phone Number: ***    Appointment Date/Time: ***   Other: ***   Address: ***   Phone Number: ***    If no appointments scheduled, explain ***      Information will be faxed to your outpatient providers to ensure a healthy continuity of care for you.     Attend all scheduled appointments with your outpatient providers. Call at least 24 hours in advance if you need to reschedule an appointment to ensure continued access to your outpatient providers.     Major Treatments, Procedures and Findings:  You were provided with: {Major Treatments:462744}    Symptoms to Report: {symptoms:043290}    Early warning signs can include: {BH Warning Signs:007434}    Safety and Wellness:  {Age Group Safety Wellness:159370}    Resources:   Mental Health Crisis Resources  Throughout [place]: call **CRISIS (**908703)  Crisis Text Line: is available for free, 24/7 by texting [place] to 761573  Suicide Awareness Voices of Education (SAVE) (www.save.org): 735-388-MOWQ (3476)  The National Suicide Prevention Lifeline is now: 988 Suicide and Crisis Lifeline. Call 988 anytime.  National [place] on Mental Illness (www.[place].jhonatan.org): 625.431.7089 or 570-232-5363.  Vaja0bvmj: text the word LIFE to 54306 for immediate support and crisis intervention  Mental Health Consumer/Survivor Network of [place] (www.mhcsn.net): 666.916.1232 or 013-152-4121  Mental  Health Association of [place] (www.mentalhealth.org): 904-335-0616 or 683-199-0992  Peer Support Connection [place] Warmline ([facility]) 1-865.516.8193 Available from 5pm - 9am (7 days a week/365 days a year)  {OP Beh CRISIS PHONE NUMBERS:864735}  {Click SUDAVSRESOURCES to add resources related to addiction or EHRBLANK to skip:120609}    General Medication Instructions:   See your medication sheet(s) for instructions.   Take all medicines as directed.  Make no changes unless your doctor suggests them.   Go to all your doctor visits.  Be sure to have all your required lab tests. This way, your medicines can be refilled on time.  Do not use any drugs not prescribed by your doctor.  Avoid alcohol.    Advance Directives:   Scanned document on file with [facility]? Minor-N/A  Is document scanned? Minor-N/A  Honoring Choices Your Rights Handout: Minor - N/A  Was more information offered? Minor-N/A    The Treatment team has appreciated the opportunity to work with you. If you have any questions or concerns about your recent admission, you can contact the unit which can receive your call 24 hours a day, 7 days a week. They will be able to get in touch with a Provider if needed. The unit number is {Adult Inpatient Mental Health Unit Numbers:866679} .    information offered? Minor-N/A    The Treatment team has appreciated the opportunity to work with you. If you have any questions or concerns about your recent admission, you can contact the unit which can receive your call 24 hours a day, 7 days a week. They will be able to get in touch with a Provider if needed. The unit number is [phone] .    [place] (www.mentalhealth.org): 757.547.5308 or 694-149-4310  Peer Support Connection [place] [facility] ([facility]) 1-503.578.6134 Available from 5pm - 9am (7 days a week/365 days a year)  [facility] 5-214-276-6607 Community Outreach for Psych Emergencies  Detox Facilities  Withdrawal Management ([address] [phone]  [facility] (in [place]) 771.686.4131  [facility] 169-809-4652  12-Step Support Groups  Alcoholics Anonymous  www.a[place].org 762-807-2386 ([facility])  www.aastpaul.org 287-120-6739 (formerly [facility])  Narcotics Anonymous www.naminDemeureota.Biomonitor 230-874-7603  Marijuana Anonymous www.marijuana-anonymous.o  319-419-5502  Crystal Meth Anonymous www.crystalmeth.org  Cocaine Anonymous www.Neoconixn.VividWorks  Non-12 Step Support Groups  Addiction Busters at [facility] http://www.[facility].org/program1.html  Smart Recovery www.smartrecovery.org  Recovery [place] recoverydharma.org    General Medication Instructions:   See your medication sheet(s) for instructions.   Take all medicines as directed.  Make no changes unless your doctor suggests them.   Go to all your doctor visits.  Be sure to have all your required lab tests. This way, your medicines can be refilled on time.  Do not use any drugs not prescribed by your doctor.  Avoid alcohol.    Advance Directives:   Scanned document on file with [facility]? Minor-N/A  Is document scanned? Minor-N/A  Honoring Choices Your Rights Handout: Minor - N/A  Was more information offered? Minor-N/A    The Treatment team has appreciated the opportunity to work with you. If you have any questions or concerns about your recent admission, you can contact the unit which can receive your call 24 hours a day, 7 days a week. They will be able to get in touch with a Provider if needed. The unit number is [phone].

## 2024-12-15 NOTE — PLAN OF CARE
"Goal Outcome Evaluation:    Problem: Adult Behavioral Health Plan of Care  Goal: Plan of Care Review  12/15/2024 1241 by Luz Diaz, RN  Outcome: Progressing     Problem: Nonsuicidal Self-Injury  Goal: Improved Behavior Regulation and Impulse Control (Nonsuicidal Self-Injury)  12/15/2024 1241 by Luz Diaz, RN  Outcome: Progressing    Pt is calm-appearing, blunted. Remains on SIO for risk of self-harm. Pt initially denies SI urges but approaches this writer later and states that they feel stressed and overwhelmed. \"I'm afraid that I will do something to myself.\" Pt is given prn klonopin at 0928. He reports that med is effective and urges are gone.    Pt is given prn Tylenol, Voltaren, and another one-time dose of ibuprofen for right foot pain. He reports no relief of pain after each of these interventions. Pt is given extra blankets and a pillow to elevate foot. Pt is offered and ice pack but declines. \"That's okay, I know that it's a matter of waiting for it to heal.\" Pt is given his shoes in morning for more support. Shoes are secured with plastic wristbands, laces removed.     Luz Diaz, RN                       "
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
## 2024-12-16 LAB — GLUCOSE BLDC GLUCOMTR-MCNC: 124 MG/DL (ref 70–99)

## 2024-12-16 PROCEDURE — 250N000013 HC RX MED GY IP 250 OP 250 PS 637: Performed by: CLINICAL NURSE SPECIALIST

## 2024-12-16 PROCEDURE — 250N000013 HC RX MED GY IP 250 OP 250 PS 637

## 2024-12-16 PROCEDURE — 250N000013 HC RX MED GY IP 250 OP 250 PS 637: Performed by: REGISTERED NURSE

## 2024-12-16 PROCEDURE — 250N000013 HC RX MED GY IP 250 OP 250 PS 637: Performed by: PSYCHIATRY & NEUROLOGY

## 2024-12-16 PROCEDURE — 124N000002 HC R&B MH UMMC

## 2024-12-16 PROCEDURE — 97150 GROUP THERAPEUTIC PROCEDURES: CPT | Mod: GO

## 2024-12-16 PROCEDURE — 99223 1ST HOSP IP/OBS HIGH 75: CPT | Performed by: PSYCHIATRY & NEUROLOGY

## 2024-12-16 PROCEDURE — A9270 NON-COVERED ITEM OR SERVICE: HCPCS | Performed by: PSYCHIATRY & NEUROLOGY

## 2024-12-16 PROCEDURE — 99233 SBSQ HOSP IP/OBS HIGH 50: CPT | Performed by: REGISTERED NURSE

## 2024-12-16 RX ORDER — ARIPIPRAZOLE 15 MG/1
15 TABLET ORAL EVERY MORNING
Status: DISCONTINUED | OUTPATIENT
Start: 2024-12-16 | End: 2024-12-16

## 2024-12-16 RX ORDER — GABAPENTIN 800 MG/1
4800 TABLET ORAL 3 TIMES DAILY
Status: DISCONTINUED | OUTPATIENT
Start: 2024-12-16 | End: 2024-12-16

## 2024-12-16 RX ORDER — GABAPENTIN 300 MG/1
600 CAPSULE ORAL EVERY EVENING
Status: DISCONTINUED | OUTPATIENT
Start: 2024-12-16 | End: 2024-12-16

## 2024-12-16 RX ORDER — GABAPENTIN 300 MG/1
600 CAPSULE ORAL EVERY EVENING
Status: COMPLETED | OUTPATIENT
Start: 2024-12-17 | End: 2024-12-20

## 2024-12-16 RX ORDER — GABAPENTIN 600 MG/1
1200 TABLET ORAL 3 TIMES DAILY
Status: COMPLETED | OUTPATIENT
Start: 2024-12-16 | End: 2024-12-16

## 2024-12-16 RX ORDER — PALIPERIDONE 3 MG/1
6 TABLET, EXTENDED RELEASE ORAL EVERY MORNING
Status: DISCONTINUED | OUTPATIENT
Start: 2024-12-16 | End: 2024-12-19

## 2024-12-16 RX ORDER — PALIPERIDONE 3 MG/1
6 TABLET, EXTENDED RELEASE ORAL EVERY MORNING
Status: DISCONTINUED | OUTPATIENT
Start: 2024-12-17 | End: 2024-12-16

## 2024-12-16 RX ORDER — TESTOSTERONE GEL, 1% 10 MG/G
100 GEL TRANSDERMAL DAILY
Status: DISPENSED | OUTPATIENT
Start: 2024-12-16

## 2024-12-16 RX ORDER — ARIPIPRAZOLE 10 MG/1
10 TABLET ORAL EVERY MORNING
Status: DISCONTINUED | OUTPATIENT
Start: 2024-12-16 | End: 2024-12-20

## 2024-12-16 RX ORDER — IBUPROFEN 200 MG
400 TABLET ORAL EVERY 8 HOURS PRN
Status: DISCONTINUED | OUTPATIENT
Start: 2024-12-16 | End: 2024-12-29

## 2024-12-16 RX ORDER — GABAPENTIN 300 MG/1
1200 CAPSULE ORAL
Status: COMPLETED | OUTPATIENT
Start: 2024-12-17 | End: 2024-12-20

## 2024-12-16 RX ADMIN — NICOTINE POLACRILEX 4 MG: 4 GUM, CHEWING BUCCAL at 11:06

## 2024-12-16 RX ADMIN — IBUPROFEN 400 MG: 200 TABLET, FILM COATED ORAL at 12:17

## 2024-12-16 RX ADMIN — GABAPENTIN 4800 MG: 800 TABLET, FILM COATED ORAL at 08:44

## 2024-12-16 RX ADMIN — NICOTINE 1 PATCH: 21 PATCH, EXTENDED RELEASE TRANSDERMAL at 08:42

## 2024-12-16 RX ADMIN — ARIPIPRAZOLE 10 MG: 10 TABLET ORAL at 09:04

## 2024-12-16 RX ADMIN — NICOTINE POLACRILEX 4 MG: 4 GUM, CHEWING BUCCAL at 12:35

## 2024-12-16 RX ADMIN — NICOTINE POLACRILEX 4 MG: 4 GUM, CHEWING BUCCAL at 19:18

## 2024-12-16 RX ADMIN — ACETAMINOPHEN 650 MG: 325 TABLET, FILM COATED ORAL at 16:01

## 2024-12-16 RX ADMIN — CLONAZEPAM 0.5 MG: 0.5 TABLET ORAL at 14:03

## 2024-12-16 RX ADMIN — NICOTINE POLACRILEX 4 MG: 4 GUM, CHEWING BUCCAL at 21:07

## 2024-12-16 RX ADMIN — PALIPERIDONE 6 MG: 3 TABLET, EXTENDED RELEASE ORAL at 11:37

## 2024-12-16 RX ADMIN — HYDROXYZINE HYDROCHLORIDE 50 MG: 25 TABLET ORAL at 02:20

## 2024-12-16 RX ADMIN — GABAPENTIN 1200 MG: 600 TABLET, FILM COATED ORAL at 19:15

## 2024-12-16 RX ADMIN — CLINDAMYCIN PHOSPHATE 1 APPLICATOR: 10 GEL TOPICAL at 10:29

## 2024-12-16 RX ADMIN — BENZOYL PEROXIDE 1 APPLICATOR: 50 GEL TOPICAL at 10:30

## 2024-12-16 RX ADMIN — ACETAMINOPHEN 650 MG: 325 TABLET, FILM COATED ORAL at 08:39

## 2024-12-16 RX ADMIN — TESTOSTERONE 100 MG OF TESTOSTERONE: 50 GEL TRANSDERMAL at 11:37

## 2024-12-16 RX ADMIN — GABAPENTIN 4800 MG: 800 TABLET, FILM COATED ORAL at 13:07

## 2024-12-16 RX ADMIN — NICOTINE POLACRILEX 4 MG: 4 GUM, CHEWING BUCCAL at 22:11

## 2024-12-16 RX ADMIN — GABAPENTIN 600 MG: 300 CAPSULE ORAL at 19:13

## 2024-12-16 RX ADMIN — OMEPRAZOLE 20 MG: 20 CAPSULE, DELAYED RELEASE ORAL at 08:43

## 2024-12-16 RX ADMIN — NICOTINE POLACRILEX 4 MG: 4 GUM, CHEWING BUCCAL at 14:03

## 2024-12-16 RX ADMIN — NICOTINE POLACRILEX 4 MG: 4 GUM, CHEWING BUCCAL at 09:36

## 2024-12-16 RX ADMIN — CEPHALEXIN 500 MG: 500 CAPSULE ORAL at 08:43

## 2024-12-16 RX ADMIN — NICOTINE POLACRILEX 4 MG: 4 GUM, CHEWING BUCCAL at 07:25

## 2024-12-16 RX ADMIN — AMLODIPINE BESYLATE 5 MG: 5 TABLET ORAL at 08:54

## 2024-12-16 RX ADMIN — VALACYCLOVIR 500 MG: 500 TABLET, FILM COATED ORAL at 08:43

## 2024-12-16 RX ADMIN — ROSUVASTATIN 40 MG: 20 TABLET, FILM COATED ORAL at 08:45

## 2024-12-16 RX ADMIN — DICLOFENAC SODIUM TOPICAL GEL, 1% 2 G: 10 GEL TOPICAL at 13:07

## 2024-12-16 RX ADMIN — EMPAGLIFLOZIN 10 MG: 10 TABLET, FILM COATED ORAL at 08:52

## 2024-12-16 RX ADMIN — HYDROXYZINE HYDROCHLORIDE 50 MG: 25 TABLET ORAL at 19:17

## 2024-12-16 RX ADMIN — INSULIN GLARGINE 5 UNITS: 100 INJECTION, SOLUTION SUBCUTANEOUS at 08:27

## 2024-12-16 RX ADMIN — NICOTINE POLACRILEX 4 MG: 4 GUM, CHEWING BUCCAL at 16:01

## 2024-12-16 ASSESSMENT — ACTIVITIES OF DAILY LIVING (ADL)
ADLS_ACUITY_SCORE: 48

## 2024-12-16 NOTE — PLAN OF CARE
Problem: Suicidal Behavior  Goal: Suicidal Behavior is Absent or Managed  Outcome: Progressing   Goal Outcome Evaluation:    Pt was intermittently in the milieu, he presented with flat affect. He stated that the day wasn't  going well with him. He was polite, cooperative with care. BG before dinner 130. Pt re-approached  writer wanting to sign the 12 hour intent. Pt says voices are telling to go and kills himself. He is frustrated that the voices are not getting better. He continues to be on SIO for risk of self injury.  He endorsed pain (right foot), SI, no plan, following what the voices are telling him. He denies HI/SIB and contracted. Compliant with scheduled medications, prn tylenol, nicotine gum X3 given.  Requested for weighted blanket, says its helps with his anxiety.  Plan of care ongoing. Will continue to monitor.   Vital signs:  Temp: 98.5  F (36.9  C) Temp src: Temporal BP: 125/89 Pulse: 84   Resp: 16 SpO2: 96 % O2 Device: None (Room air)
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
## 2024-12-16 NOTE — PLAN OF CARE
"Goal Outcome Evaluation:    Problem: Adult Behavioral Health Plan of Care  Goal: Adheres to Safety Considerations for Self and Others  Outcome: Progressing     Problem: Adult Behavioral Health Plan of Care  Goal: Develops/Participates in Therapeutic [place] to Support Successful Transition  Outcome: Progressing     Problem: Anxiety Signs/Symptoms  Goal: Improved Mood Symptoms (Anxiety Signs/Symptoms)  Outcome: Progressing     Pt remains on SIO as he has not been able to contract for safety at this time. Pt has been polite and patient with requests. He asks relative to another pt manic intrusiveness: \"I realize it is not my business but is the problem going to be taken care of?\" Writer acknowledges that the milieu is not as therapeutic as we would like and that we appreciate his patience. Pt nods and states: \"Well, I can be impulsive, so I will do my best but I may end up saying something to her.\" He adds: \"I'm not being threatening, but just saying that I'm impulsive. This situation really makes me want my klonopin.\"     Pt has attended groups and been in the milieu intermittently. Pt receives a number of prns, including klonopin, see MAR.    Luz Diaz RN                        "
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
## 2024-12-16 NOTE — PLAN OF CARE
Upcoming Meetings and Appointments:   Team note: Monday     Tasks Complete:  Chart review and met with team, discussed pt progress, symptomology, and response to treatment.  Discussed the discharge plan and any potential impediments to discharge    Josie and I met today to discuss his social service plan. He notes that he owns e reports that he has a 5-year-old dog that will not be cared for or have supervision after tomorrow . He reports he has a goal to reduce auditory hallucination. I called several resources and left voicemail's regarding temporary placement for his dog and was not successful. Most facilities are full or do not board animals for a short period of time. I am reluctant to call the human society at this time as his friend is currently watching his dog today. I provided josie with a hand out of resources to call today. In addition, I contacted and left a voicemail for his , Jie David to request financial [facility] from [facility] or case management team to assist with boarding his dog. I advised Josie to ask family or friends for assistance with boarding his animal. I will plan to meet with Josie tomorrow morning and begin calling resources again. Lastly, josie reports that he is not receiving enough ILS hours at this time.   I spoke with coverage worker, Nadine Alberto who agreed to access client funds for boarding. Will plan to meet via phone with  to obtain boarding for his dog. Likely will only be able to pay for coverage through the week.     I called [address] [place] [address] and inquired if  there is a possibility for Josie to utilize free boarding/ temporary relief via [facility] if Josie's dog is located in [place] tomorrow . [facility] intake took my contact information and states someone will call me back.        Discharge Plan or Goal   Plan  Discharge home with outpatient supports         Care Team   Current Treatment Providers are:  Primary Physician: Becki Avery  with [facility] in [place]      Psychiatrist/Medication Management:  Name/Organization: Reports he was seeing a provider at [facility] but wants to find a new provider with whom he feels a better connection with.         :  Name/Organization: Jie David  [facility] Resources     [facility] Apartment: Reports he moved into his apartment in August of this year, location is [place].  Name/Organization: [employer], owner Fatou Martinez       [facility]:  Name/Organization: Deena Ibarra - [facility] signed  Phone: [phone]      [facility] Worker:  Name/Organization: Silvia Ham  [facility]  Phone:       Art Therapy: [facility]      Individual Therapy: Augustina with [facility]      Barriers to Discharge   Ongoing stabilization      Referral Status  TBD     Legal Status  Voluntary; pt is committed and jarvised. CM will plan to revoke PD if pt asks for a 12 hour intent and is not stable or ready for discharge.       Next Steps:  Assist Prakash with finding resources or creating a plan to board/care for his dog.    Contact [facility] CM to update. Note that Prakash reports that he is not receiving accurate hours of ILS services     Prakash would like a new psych provider- states that his  is referring. Follow up with CM to inquire who the provider will be.

## 2024-12-16 NOTE — PLAN OF CARE
Pt appeared to be sleeping in his room at the start of the night.  Awake at 1:45 am sitting in bed.  [facility] staff remains at bedside for safety/self-injury risk.  At 2:15 am he requested PRN Hydroxyzine for anxiety and sleep.  Pt states that PRN Trazodone and melatonin doesn't work as well for him.  No other concerns were reported tonight.   Approximately 5.25 hours of sleep is recorded.

## 2024-12-16 NOTE — CARE PLAN
Rehab Group    Start time: 1015  End time: 1200  Patient time total: 67 minutes    attended full group    #5 attended   Group Type: occupational therapy   Group Topic Covered: balanced lifestyle, cognitive activities, coping skills, emotional regulation, healthy leisure time, self-esteem, and social skills       Group Session Detail:   Ribbon [place] Javier wreaths and yarn ornaments for creative expression, concentration, simple direction following, sequencing, follow through, frustration tolerance, coping, mood stabilization, reality-based activity, building self-esteem, and socialization.       Patient Response/Contribution:  socially appropriate, attentive, and actively engaged       Patient Detail:  Pt joined group with their 1:1 staff present.  Pt was social with therapist and 1:1 staff throughout the group session.  Pt was in and out of the room a few times, to meet basic needs.  Pt requested to work on their journal rather than make a wreath and were allowed to do so.  Pt discussed the various types of journaling that they like to do.  Pt seemed to be content, though was somewhat bland in affect.  Pt recalled working with this writer during previous hospital admissions.          [id number] OT Group (2 or more in attendance)      Patient Active Problem List   Diagnosis    ADHD (attention deficit hyperactivity disorder)    Marijuana abuse    Polysubstance abuse (H)    GERD (gastroesophageal reflux disease)    Tobacco abuse    Intractable back pain    Optic neuritis    Cauda equina syndrome with neurogenic bladder (H)    Schizoaffective disorder, bipolar type (H)    PTSD (post-traumatic stress disorder)    Anxiety    Auditory hallucination    Class 2 obesity due to excess calories without serious comorbidity with body mass index (BMI) of 36.0 to 36.9 in adult    Nephrolithiasis    Cannabis use disorder, severe, dependence (H)    Depression    History of heroin abuse (H)    Opioid use disorder, severe, dependence (H)     Substance-induced psychotic disorder with hallucinations (H)    Nausea    Urinary retention    Chronic bilateral low back pain without sciatica    AVA (generalized anxiety disorder)    Lumbosacral radiculopathy at L5    Abnormal uterine bleeding    Bipolar affective disorder, mixed, severe, with psychotic behavior (H)    Akathisia    Hypertension, unspecified type    Female-to-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 person    Morbid obesity (H)    Mood disorder due to a general medical condition    Acne vulgaris    Type 2 diabetes mellitus with hyperglycemia, with long-term current use of insulin (H)    Herpes labialis    Major depressive disorder, 

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
## 2024-12-17 LAB
ATRIAL RATE - MUSE: 67 BPM
DIASTOLIC BLOOD PRESSURE - MUSE: NORMAL MMHG
GLUCOSE BLDC GLUCOMTR-MCNC: 107 MG/DL (ref 70–99)
GLUCOSE BLDC GLUCOMTR-MCNC: 125 MG/DL (ref 70–99)
INTERPRETATION ECG - MUSE: NORMAL
P AXIS - MUSE: NORMAL DEGREES
PR INTERVAL - MUSE: 144 MS
QRS DURATION - MUSE: 100 MS
QT - MUSE: 430 MS
QTC - MUSE: 454 MS
R AXIS - MUSE: -15 DEGREES
SYSTOLIC BLOOD PRESSURE - MUSE: NORMAL MMHG
T AXIS - MUSE: -42 DEGREES
VENTRICULAR RATE- MUSE: 67 BPM

## 2024-12-17 PROCEDURE — 124N000002 HC R&B MH UMMC

## 2024-12-17 PROCEDURE — 250N000013 HC RX MED GY IP 250 OP 250 PS 637: Performed by: PSYCHIATRY & NEUROLOGY

## 2024-12-17 PROCEDURE — A9270 NON-COVERED ITEM OR SERVICE: HCPCS | Performed by: PSYCHIATRY & NEUROLOGY

## 2024-12-17 PROCEDURE — 99233 SBSQ HOSP IP/OBS HIGH 50: CPT | Performed by: REGISTERED NURSE

## 2024-12-17 PROCEDURE — 250N000013 HC RX MED GY IP 250 OP 250 PS 637: Performed by: REGISTERED NURSE

## 2024-12-17 PROCEDURE — 250N000013 HC RX MED GY IP 250 OP 250 PS 637: Performed by: CLINICAL NURSE SPECIALIST

## 2024-12-17 PROCEDURE — 250N000013 HC RX MED GY IP 250 OP 250 PS 637

## 2024-12-17 PROCEDURE — 97150 GROUP THERAPEUTIC PROCEDURES: CPT | Mod: GO

## 2024-12-17 RX ADMIN — NICOTINE POLACRILEX 4 MG: 4 GUM, CHEWING BUCCAL at 11:25

## 2024-12-17 RX ADMIN — ACETAMINOPHEN 650 MG: 325 TABLET, FILM COATED ORAL at 04:48

## 2024-12-17 RX ADMIN — ROSUVASTATIN 40 MG: 20 TABLET, FILM COATED ORAL at 08:09

## 2024-12-17 RX ADMIN — SERTRALINE HYDROCHLORIDE 50 MG: 50 TABLET ORAL at 08:09

## 2024-12-17 RX ADMIN — CLONAZEPAM 0.5 MG: 0.5 TABLET ORAL at 11:12

## 2024-12-17 RX ADMIN — NICOTINE POLACRILEX 4 MG: 4 GUM, CHEWING BUCCAL at 18:25

## 2024-12-17 RX ADMIN — NICOTINE 1 PATCH: 21 PATCH, EXTENDED RELEASE TRANSDERMAL at 08:17

## 2024-12-17 RX ADMIN — ACETAMINOPHEN 650 MG: 325 TABLET, FILM COATED ORAL at 18:23

## 2024-12-17 RX ADMIN — NICOTINE POLACRILEX 4 MG: 4 GUM, CHEWING BUCCAL at 06:34

## 2024-12-17 RX ADMIN — GABAPENTIN 600 MG: 300 CAPSULE ORAL at 20:25

## 2024-12-17 RX ADMIN — OMEPRAZOLE 20 MG: 20 CAPSULE, DELAYED RELEASE ORAL at 08:09

## 2024-12-17 RX ADMIN — GABAPENTIN 1200 MG: 300 CAPSULE ORAL at 14:39

## 2024-12-17 RX ADMIN — AMLODIPINE BESYLATE 5 MG: 5 TABLET ORAL at 08:09

## 2024-12-17 RX ADMIN — VALACYCLOVIR 500 MG: 500 TABLET, FILM COATED ORAL at 08:09

## 2024-12-17 RX ADMIN — NICOTINE POLACRILEX 4 MG: 4 GUM, CHEWING BUCCAL at 08:07

## 2024-12-17 RX ADMIN — INSULIN GLARGINE 5 UNITS: 100 INJECTION, SOLUTION SUBCUTANEOUS at 08:30

## 2024-12-17 RX ADMIN — ARIPIPRAZOLE 10 MG: 10 TABLET ORAL at 08:09

## 2024-12-17 RX ADMIN — PALIPERIDONE 6 MG: 3 TABLET, EXTENDED RELEASE ORAL at 08:09

## 2024-12-17 RX ADMIN — CLINDAMYCIN PHOSPHATE: 10 GEL TOPICAL at 08:29

## 2024-12-17 RX ADMIN — BENZOYL PEROXIDE: 50 GEL TOPICAL at 08:29

## 2024-12-17 RX ADMIN — NICOTINE POLACRILEX 4 MG: 4 GUM, CHEWING BUCCAL at 13:53

## 2024-12-17 RX ADMIN — EMPAGLIFLOZIN 10 MG: 10 TABLET, FILM COATED ORAL at 08:09

## 2024-12-17 RX ADMIN — IBUPROFEN 400 MG: 200 TABLET, FILM COATED ORAL at 06:34

## 2024-12-17 RX ADMIN — GABAPENTIN 1200 MG: 300 CAPSULE ORAL at 08:09

## 2024-12-17 RX ADMIN — HYDROXYZINE HYDROCHLORIDE 75 MG: 25 TABLET ORAL at 20:23

## 2024-12-17 RX ADMIN — NICOTINE POLACRILEX 4 MG: 4 GUM, CHEWING BUCCAL at 21:49

## 2024-12-17 RX ADMIN — CLONAZEPAM 0.5 MG: 0.5 TABLET ORAL at 19:26

## 2024-12-17 RX ADMIN — CYCLOBENZAPRINE HYDROCHLORIDE 10 MG: 5 TABLET, FILM COATED ORAL at 16:05

## 2024-12-17 RX ADMIN — Medication 3 MG: at 20:25

## 2024-12-17 ASSESSMENT — ACTIVITIES OF DAILY LIVING (ADL)
ADLS_ACUITY_SCORE: 48

## 2024-12-17 NOTE — PLAN OF CARE
"Upcoming Meetings and Appointments:   Team note: Monday     CM meeting at 12:30PM 12/18/24    Tasks Complete:  Chart review and met with team, discussed pt progress, symptomology, and response to treatment.  Discussed the discharge plan and any potential impediments to discharge    Prakash and I met with his case management team, which includes his  's supervisor and . The  will assist with funding for temporary boarding for his dog, though this support may only last for a couple of days. The  plans to meet with Prakash in person tomorrow at 12:30 PM. During our meeting, Prakash inquired if his  had received his email. She indicated that she had not checked her email over the weekend and did not work yesterday. Prakash then expressed , \" I said fuck you, and I never wanted to see you again\".    Prakash also articulated his concerns about his voluntary status and the potential for revocation. I took the opportunity to explain the events that led to his hospitalization and the rationale for a possible revocation if deemed necessary in the future. He appeared receptive to this information and subsequently apologized to his . Additionally, Prakash informed her that he will be receiving Electroconvulsive Therapy (ECT). I clarified that Prakash is seeking ECT and that his healthcare provider is exploring various treatment options.    I asked Prakash about his plans for his dog IF he undergoes ECT. He responded that he is uncertain and feels overwhelmed by the situation. His  suggested the possibility of having his family take care of the dog, but Prakash mentioned that he had already asked them, and they declined. The  has emailed a Release of Information (VAL) form, which Prakash has signed, and I have returned it to her. I also provided the  with my phone number to facilitate ongoing coordination regarding the boarding care " "for Prakash s dog.    UPDATE: Prakash does not have current vaccination records for his dog. He states his dog is with his friend for now. He noted that he believes his  will \" work something out\".     Additional resources: Rosalia Izquierdo from [employer] notes that there is a possibility that she personally will assist with letting the dog outside during the day ( emergency situation only ) [phone]      Discharge Plan or Goal   Plan  Discharge home with outpatient supports         Care Team   Current Treatment Providers are:  Primary Physician: Becki Avery with [facility] in [place]      Psychiatrist/Medication Management:  Name/Organization: Reports he was seeing a provider at [facility] but wants to find a new provider with whom he feels a better connection with.         :  Name/Organization: Jie David  [facility]     [facility] Apartment: Reports he moved into his apartment in August of this year, location is [place].  Name/Organization: [employer], owner Fatou Martinez       [facility]:  Name/Organization: Deena NEAL signed  Phone: [phone]      [facility] Worker:  Name/Organization: Silvia Ju[facility]  Phone:       Art Therapy: [facility]      Individual Therapy: [facility] with [facility]      Barriers to Discharge   Ongoing stabilization      Referral Status  TBD     Legal Status  Voluntary; pt is committed and jarvised. CM will plan to revoke PD if pt asks for a 12 hour intent and is not stable or ready for discharge.       Next Steps:  Assist Prakash with finding resources or creating a plan to board/care for his dog.    Contact [facility] CM to update. Note that Prakash reports that he is not receiving accurate hours of ILS services     Prakash would like a new psych provider- states that his  is referring. Follow up with CM to inquire who the provider will be.       "

## 2024-12-17 NOTE — PLAN OF CARE
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
## 2024-12-17 NOTE — PLAN OF CARE
Problem: Anxiety Signs/Symptoms  Goal: Optimized Energy Level (Anxiety Signs/Symptoms)  Outcome: Progressing   Goal Outcome Evaluation:    Pt was more isolative in his room. He presented with flat affect. He endorsed anxiety, stated he had a panic attack, triggered by other patients in the hallway.  He was sobbing saying he has flash back which made him sad.  Continues to endorse voices telling him to take his life to harm himself. Endorse anxiety. C/o foot pain, 650 mg of tylenol given. He ate adequately for dinner. Compliant with medication, prn tylenol and nicote gum X3 given. Pt attended group. Continues on SIO for risk of self injury.Plan of care ongoing, will continue to monitor.  Vital signs:  Temp: 98.1  F (36.7  C) Temp src: Temporal BP: 131/88 Pulse: 97   Resp: 16 SpO2: 97 % O2 Device: None (Room air)

## 2024-12-17 NOTE — PLAN OF CARE
Goal Outcome Evaluation:Goal Outcome Evaluation:      Initial meeting note:    Therapist introduced self to patient and discussed psychotherapy service available to patient.     Pt response: Pt expressed interest in meeting with therapist    Plan: Made plan to meet with pt again; began identifying goals     Writer discussed meeting tomorrow.  Writer knows pt from station 10 and 30,  writer reminded pt writer was therapist on this unit as well and that we can meet 1:1 tomorrow Tuesday.

## 2024-12-17 NOTE — PROGRESS NOTES
Pt woke up at 0445, C/O headache rated 4/10, PRN Tylenol was given with no effect. BG at 0600 . Pt requested for Ibuprofen after 2 hrs for headache with effect. Pt appeared to have slept for approximately 4.75 hrs

## 2024-12-17 NOTE — PLAN OF CARE
Problem: Anxiety Signs/Symptoms  Goal: Optimized Energy Level (Anxiety Signs/Symptoms)  Outcome: Progressing  Intervention: Optimize Energy Level  Recent Flowsheet Documentation  Taken 12/17/2024 0803 by Espinoza Mccormick, RN  Activity (Behavioral Health): up ad joshua   Goal Outcome Evaluation:    Plan of Care Reviewed With: patient        Pt reports significant anxiety mostly related to another patient being loud on the unit. Pt was given klonopin PRN x1. Pt continues to endorse suicidal thoughts and command auditory hallucinations telling him to do bad stuff to himself. Pt did commit to safety while he was here. During the afternoon pt reported even higher level of anxiety and said he wanted to leave here and sign a 12 hour intent to leave but that he also knew that would likely lead to him being placed on a 72 hour hold. Later, pt was on the phone with a friend who triggered him. Pt then punched the window in his bedroom door with his right hand. The hand was sore per patient but did not appear bruised. The hand was slightly swollen. Pt had ice on it and reported he would alert nursing if pain or the swelling worsened. Pt remains on SIO for safety.

## 2024-12-17 NOTE — CARE PLAN
Rehab Group    Start time: 1315  End time: 1410  Patient time total: 30 minutes    attended partial group     #3 attended   Group Type: occupational therapy   Group Topic Covered: balanced lifestyle, cognitive activities, coping skills, healthy leisure time, self-esteem, and social skills       Group Session Detail:  Memory activity for concentration, short term memory practice, follow through, frustration tolerance, tracking, coping, mood stabilization, reality-based activity, building self-esteem, and socialization.       Patient Response/Contribution:  socially appropriate, attentive, and actively engaged       Patient Detail:  Pt presented with a blunted/bland affect.  Though pt did brighten briefly with success.  Pt was adept at the memory activity, paying good attention to the placement of the tiles.  Pt was polite and appropriate upon approach.  Pt's 1:1 staff was present.          [id number] OT Group (2 or more in attendance)      Patient Active Problem List   Diagnosis    ADHD (attention deficit hyperactivity disorder)    Marijuana abuse    Polysubstance abuse (H)    GERD (gastroesophageal reflux disease)    Tobacco abuse    Intractable back pain    Optic neuritis    Cauda equina syndrome with neurogenic bladder (H)    Schizoaffective disorder, bipolar type (H)    PTSD (post-traumatic stress disorder)    Anxiety    Auditory hallucination    Class 2 obesity due to excess calories without serious comorbidity with body mass index (BMI) of 36.0 to 36.9 in adult    Nephrolithiasis    Cannabis use disorder, severe, dependence (H)    Depression    History of heroin abuse (H)    Opioid use disorder, severe, dependence (H)    Substance-induced psychotic disorder with hallucinations (H)    Nausea    Urinary retention    Chronic bilateral low back pain without sciatica    AVA (generalized anxiety disorder)    Lumbosacral radiculopathy at L5    Abnormal uterine bleeding    Bipolar affective disorder, mixed, severe, with  psychotic behavior (H)    Akathisia    Hypertension, unspecified type    Female-to-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 person    Morbid obesity (H)    Mood disorder due to a general medical condition    Acne vulgaris    Type 2 diabetes mellitus with hyperglycemia, with long-term current use of insulin (H)    Herpes labialis    Major depressive disorder, 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

## 2024-12-18 LAB
GLUCOSE BLDC GLUCOMTR-MCNC: 145 MG/DL (ref 70–99)
GLUCOSE BLDC GLUCOMTR-MCNC: 96 MG/DL (ref 70–99)

## 2024-12-18 PROCEDURE — 124N000002 HC R&B MH UMMC

## 2024-12-18 PROCEDURE — 99233 SBSQ HOSP IP/OBS HIGH 50: CPT | Performed by: REGISTERED NURSE

## 2024-12-18 PROCEDURE — 90853 GROUP PSYCHOTHERAPY: CPT

## 2024-12-18 PROCEDURE — 250N000013 HC RX MED GY IP 250 OP 250 PS 637

## 2024-12-18 PROCEDURE — 90834 PSYTX W PT 45 MINUTES: CPT | Performed by: COUNSELOR

## 2024-12-18 PROCEDURE — 250N000013 HC RX MED GY IP 250 OP 250 PS 637: Performed by: CLINICAL NURSE SPECIALIST

## 2024-12-18 PROCEDURE — 250N000013 HC RX MED GY IP 250 OP 250 PS 637: Performed by: PSYCHIATRY & NEUROLOGY

## 2024-12-18 PROCEDURE — 250N000013 HC RX MED GY IP 250 OP 250 PS 637: Performed by: REGISTERED NURSE

## 2024-12-18 PROCEDURE — 250N000011 HC RX IP 250 OP 636

## 2024-12-18 PROCEDURE — A9270 NON-COVERED ITEM OR SERVICE: HCPCS | Performed by: PSYCHIATRY & NEUROLOGY

## 2024-12-18 RX ORDER — OLANZAPINE 5 MG/1
5-10 TABLET, ORALLY DISINTEGRATING ORAL 3 TIMES DAILY PRN
Status: DISCONTINUED | OUTPATIENT
Start: 2024-12-18 | End: 2024-12-20

## 2024-12-18 RX ORDER — CLONAZEPAM 0.5 MG/1
0.5 TABLET ORAL ONCE
Status: COMPLETED | OUTPATIENT
Start: 2024-12-18 | End: 2024-12-18

## 2024-12-18 RX ORDER — CLONIDINE HYDROCHLORIDE 0.1 MG/1
0.1 TABLET ORAL EVERY 6 HOURS PRN
Status: DISCONTINUED | OUTPATIENT
Start: 2024-12-18 | End: 2024-12-18

## 2024-12-18 RX ORDER — CLONIDINE HYDROCHLORIDE 0.1 MG/1
0.1 TABLET ORAL 3 TIMES DAILY PRN
Status: DISPENSED | OUTPATIENT
Start: 2024-12-18

## 2024-12-18 RX ADMIN — PALIPERIDONE 6 MG: 3 TABLET, EXTENDED RELEASE ORAL at 10:17

## 2024-12-18 RX ADMIN — VALACYCLOVIR 500 MG: 500 TABLET, FILM COATED ORAL at 10:17

## 2024-12-18 RX ADMIN — CLINDAMYCIN PHOSPHATE: 10 GEL TOPICAL at 09:07

## 2024-12-18 RX ADMIN — VALPROIC ACID 250 MG: 250 SOLUTION ORAL at 10:16

## 2024-12-18 RX ADMIN — HYDROXYZINE HYDROCHLORIDE 75 MG: 25 TABLET ORAL at 20:21

## 2024-12-18 RX ADMIN — OLANZAPINE 5 MG: 5 TABLET, ORALLY DISINTEGRATING ORAL at 11:19

## 2024-12-18 RX ADMIN — NICOTINE POLACRILEX 4 MG: 4 GUM, CHEWING BUCCAL at 16:09

## 2024-12-18 RX ADMIN — CLONAZEPAM 0.5 MG: 0.5 TABLET ORAL at 14:26

## 2024-12-18 RX ADMIN — OMEPRAZOLE 20 MG: 20 CAPSULE, DELAYED RELEASE ORAL at 10:17

## 2024-12-18 RX ADMIN — VALPROIC ACID 250 MG: 250 SOLUTION ORAL at 20:21

## 2024-12-18 RX ADMIN — GABAPENTIN 1200 MG: 300 CAPSULE ORAL at 16:29

## 2024-12-18 RX ADMIN — EMPAGLIFLOZIN 10 MG: 10 TABLET, FILM COATED ORAL at 10:17

## 2024-12-18 RX ADMIN — CLONAZEPAM 0.5 MG: 0.5 TABLET ORAL at 09:08

## 2024-12-18 RX ADMIN — AMLODIPINE BESYLATE 5 MG: 5 TABLET ORAL at 10:17

## 2024-12-18 RX ADMIN — CLONIDINE HYDROCHLORIDE 0.1 MG: 0.1 TABLET ORAL at 09:28

## 2024-12-18 RX ADMIN — SERTRALINE HYDROCHLORIDE 50 MG: 50 TABLET ORAL at 10:17

## 2024-12-18 RX ADMIN — INSULIN GLARGINE 5 UNITS: 100 INJECTION, SOLUTION SUBCUTANEOUS at 09:06

## 2024-12-18 RX ADMIN — NICOTINE POLACRILEX 4 MG: 4 GUM, CHEWING BUCCAL at 19:33

## 2024-12-18 RX ADMIN — BENZOYL PEROXIDE: 50 GEL TOPICAL at 09:07

## 2024-12-18 RX ADMIN — GABAPENTIN 1200 MG: 300 CAPSULE ORAL at 10:16

## 2024-12-18 RX ADMIN — NICOTINE POLACRILEX 4 MG: 4 GUM, CHEWING BUCCAL at 07:47

## 2024-12-18 RX ADMIN — NICOTINE POLACRILEX 4 MG: 4 GUM, CHEWING BUCCAL at 09:07

## 2024-12-18 RX ADMIN — ARIPIPRAZOLE 10 MG: 10 TABLET ORAL at 10:17

## 2024-12-18 RX ADMIN — OLANZAPINE 5 MG: 5 TABLET, ORALLY DISINTEGRATING ORAL at 18:04

## 2024-12-18 RX ADMIN — NICOTINE POLACRILEX 4 MG: 4 GUM, CHEWING BUCCAL at 21:01

## 2024-12-18 RX ADMIN — NICOTINE 1 PATCH: 21 PATCH, EXTENDED RELEASE TRANSDERMAL at 07:49

## 2024-12-18 RX ADMIN — NICOTINE POLACRILEX 4 MG: 4 GUM, CHEWING BUCCAL at 18:03

## 2024-12-18 RX ADMIN — GABAPENTIN 600 MG: 300 CAPSULE ORAL at 20:21

## 2024-12-18 RX ADMIN — NICOTINE POLACRILEX 4 MG: 4 GUM, CHEWING BUCCAL at 14:26

## 2024-12-18 RX ADMIN — NICOTINE POLACRILEX 4 MG: 4 GUM, CHEWING BUCCAL at 22:03

## 2024-12-18 RX ADMIN — ONDANSETRON 4 MG: 4 TABLET, ORALLY DISINTEGRATING ORAL at 07:09

## 2024-12-18 RX ADMIN — ROSUVASTATIN 40 MG: 20 TABLET, FILM COATED ORAL at 10:17

## 2024-12-18 RX ADMIN — TESTOSTERONE 100 MG OF TESTOSTERONE: 50 GEL TRANSDERMAL at 11:26

## 2024-12-18 ASSESSMENT — ACTIVITIES OF DAILY LIVING (ADL)
ADLS_ACUITY_SCORE: 48
HYGIENE/GROOMING: INDEPENDENT
ADLS_ACUITY_SCORE: 48
ORAL_HYGIENE: INDEPENDENT
ADLS_ACUITY_SCORE: 48
ADLS_ACUITY_SCORE: 48
DRESS: STREET CLOTHES
ADLS_ACUITY_SCORE: 48
LAUNDRY: WITH SUPERVISION
ADLS_ACUITY_SCORE: 48

## 2024-12-18 NOTE — PLAN OF CARE
"Goal Outcome Evaluation:    Individual Therapy Note      Date of Service: December 18, 2024    Patient: Prakash goes by \"Prakash,\" uses he/him pronouns    Individuals Present: Prakash Jazzy Ruiz [facility]    Session start: 1:50 pm pm  Session end: 2:30 pm  Session duration in minutes: 40      Modality Used: Rapport Building, Motivational Interviewing, and Art Therapy, he sees an Art Therapist in the community that writer referred him to last admission and he reported \" I love it\". He is seen twice per week and the creative practice helps with SIB ideation , voices , impulsivity    Goals: tap into how DBT and creative arts therapies can help him on the unit when he reports being anxious from other peers acting out on the unit. When he gets the intensity of anxiety, he hears more voices and has more impulsivity. He had good questions for writer on how to work with not acting impulsively and how to use creativity on the unit to cope. Writer and he discussed a lot of ideas. Writer gave him some homework to try.    Patient Description of current symptoms: more voices and anxiety, self harm urges     Mental Status Exam:   Attitude: cooperative  Eye Contact: fair  Mood: anxious  Affect: intensity is heightened  Speech: clear, coherent  Psychomotor Behavior: no evidence of tardive dyskinesia, dystonia, or tics  Thought Process:  logical, linear, goal oriented, and illogical  Associations: no loose associations  Thought Content: thoughts of self-harm, which are increased with some of the loudness on the unit  Insight: limited  Judgement: fair  Attention Span and Concentration: intact    Pt progress: Pt was proactive in asking for ways to cope on the unit when he gets self harm urges or hears troubling voices    Treatment Objective(s) Addressed:   The focus of this session was on orienting the patient to therapy, building distress tolerance, identifying treatment goals, and building self-esteem     Progress Towards Goals and " Assessment of Patient:   Patient is making progress towards treatment goals as evidenced by willingness to engage and advocate for self and services for self. He can benefit from all creative arts therapies.  He asked another  about dance movement therapy as well.      Therapeutic Intervention(s):   Provided active listening, unconditional positive regard, and validation.   Explored motivation for behavioral change, Engaged in cognitive restructuring/ reframing, looked at common cognitive distortions and challenged negative thoughts, Provided positive reinforcement for progress towards goals, gains in knowledge, and application of skills previously taught, Used de-escalation techniques in the moment to reinforce appropriate emotional regulation, Discussed TIPP (body temperature, intense exercise, PMR), Identified stress relief practices, Explored strategies for self-soothing, and Introduced and practiced Wise Mind Introduced and practiced urge surfing      Plan/next step: meet again to share creative arts homework and how it helped him cope with difficult moments on the unit.    03706 - Psychotherapy (with patient) - 45 (38-52*) min    Patient Active Problem List   Diagnosis    ADHD (attention deficit hyperactivity disorder)    Marijuana abuse    Polysubstance abuse (H)    GERD (gastroesophageal reflux disease)    Tobacco abuse    Intractable back pain    Optic neuritis    Cauda equina syndrome with neurogenic bladder (H)    Schizoaffective disorder, bipolar type (H)    PTSD (post-traumatic stress disorder)    Anxiety    Auditory hallucination    Class 2 obesity due to excess calories without serious comorbidity with body mass index (BMI) of 36.0 to 36.9 in adult    Nephrolithiasis    Cannabis use disorder, severe, dependence (H)    Depression    History of heroin abuse (H)    Opioid use disorder, severe, dependence (H)    Substance-induced psychotic disorder with hallucinations (H)    Nausea    Urinary  retention    Chronic bilateral low back pain without sciatica    AVA (generalized anxiety disorder)    Lumbosacral radiculopathy at L5    Abnormal uterine bleeding    Bipolar affective disorder, mixed, severe, with psychotic behavior (H)    Akathisia    Hypertension, unspecified type    Female-to-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 person    Morbid obesity (H)    Mood disorder due to a general medical condition    Acne vulgaris    Type 2 diabetes mellitus with hyperglycemia, with long-term current use of insulin (H)    Herpes labialis    Major depressive disorder, 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

## 2024-12-18 NOTE — PLAN OF CARE
Problem: Adult Behavioral Health Plan of Care  Goal: Plan of Care Review  Outcome: Progressing  Flowsheets  Taken 12/17/2024 1840  Plan of Care Reviewed With: patient  Overall Patient Progress: improving  Patient Agreement with Plan of Care: agrees     Note from 15:00 to 19:00  At the start of the shift patient was seen sitting in the lounge. He requested to see the writer and reported R hand pain due to recent injury. He said that around 3pm today, between staff shift change, he had an outburst of auto-aggression triggered by commotion on the unit and another patient's disruptive behaviors. He said that he had hit the door of his room with closed fist. Upon assessment the hand appeared swollen and the knuckles bruised.Patient was able to open and closed his fist. IM notified. IM provider advised to monitor and if the swelling and pain continue tomorrow and patient is unable to open/close fist to notify medicine again for imaging. Patient also reported feeling angry and agitated. We talked about copying strategies. Patient used breathing and relaxation but not very effective so he requested PRN Flexeril and later on Tylenol.

## 2024-12-18 NOTE — PLAN OF CARE

## 2024-12-18 NOTE — PLAN OF CARE
"  Problem: Suicidal Behavior  Goal: Suicidal Behavior is Absent or Managed  Outcome: Progressing   Goal Outcome Evaluation:    Plan of Care Reviewed With: patient        Pt has been visible in milieu and reports sleeping exceptionally well last evening. Pt continues to endorse suicidal thoughts without plan or intent. Pt also reports continued command auditory hallucinations to harm himself. Pt rates the severity of his depression at 8/10 high. He reports anxiety symptoms at 2-3/10 high, which is a marked improvement from yesterdays assessment by this writer. Patient tells writer he was able to secure longer term care for his dog, which has led to a reduction in anxious feelings. Pt committed to safety and agrees he would go to staff if he was having urges to harm himself if he was to be taken off of SIO status.    Pt received a one time dose of clonazepam and a PRN dose of clonidine after reporting hot flashes. 0800 medication administration was delayed due to complaints of nausea. Pt reported only minimal reduction in nausea after ondansetron was administered.    1115: Pt requested and received PRN olanzapine 5 mg for agitation.    1210: Pt approached writer asking to sign a 12 hour intent to leave. Pt said, \"I can't do it anymore. Nothing has changed the whole time I've been here, I just want to go home so I can kill myself. I don't care if they put me  on a hold, whatever.\" Provider notified.    1220: Pt approached with blood on the top of left hand proximal to the knuckles. The wound was cleaned with normal saline, antibiotic ointment applied, and a band aid placed. Provider aware.    1445: Pt was provided a copy of his 72 hour hold and the rights of a patient on a hold. Pt denied having any questions about the hold or his rights while on the hold. Pt has returned to SIO status due to self-injurious behavior.         "

## 2024-12-18 NOTE — PLAN OF CARE
"Goal Outcome Evaluation:    IP Treatment Plan    Client's Name: Prakash Prasad  YOB: 1990      Treatment Plan Date: December 18, 2024      Anticipated number of sessions for this episode of care: 8    Current Concerns/Problem Areas:    Goal 1: Pt will decrease suicidal ideation by establishing more reasons to live  Pt will demonstrate and recognize improved self-regulation at least 4x/day from a baseline of 1/day and Identify 5 current distress management skills and increase by adding 3 additional way(s) to manage distress of psychotic symptoms Pt will increase the use of healthy coping skills from 2 times to 6 times/day      Intervention(s)    Engaged in safety planning identifying coping skills, warning signs, health support and resources, Understand and identify reasons for hospitalization, how to use the hospital to create change and prevent future hospitalizations , Explored motivation for behavioral change, Engaged in relaxation training (e.g. meditation, progressive muscle relaxation, etc.), Identified stress relief practices, Explored strategies for self-soothing, and Reviewed healthy living that supports positive mental health, including looking at sleep hygiene, regular movement, nutrition, and regular socialization      Pt centered considerations  Pt considerations transgender, female to male  Describes intense anxiety due to chaos on unit, translates to more \" voices\", more Si and SIB urges. They hadve the skills but forget to use in the moment.                                   "
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
## 2024-12-18 NOTE — PLAN OF CARE
Problem: Suicide Risk  Goal: Absence of Self-Harm  Outcome: Progressing      Patient slept approximately 6.25 hours during the night shift, appeared comfortable with unlabored breathing patterns. Continues safety checks every 15 minutes. Patient on SIO 1:1 for self injury risk.

## 2024-12-18 NOTE — PLAN OF CARE
"Upcoming Meetings and Appointments:   Team note: Monday     CM meeting at 12:30PM 12/18/24    Tasks Complete:  Chart review and met with team, discussed pt progress, symptomology, and response to treatment.  Discussed the discharge plan and any potential impediments to discharge    Prakash began meeting with his  when he requested that I join this meeting. Praksah confirmed that I could assist with scheduling a TMS appointment in addition he requested to sign a 12 hour intent. His  inquired if he is till hearing voices commanding him to kill himself. He replied \" yes\". I asked him if he can keep himself safe at home. He replied \" no I'm going to kill myself\". I encouraged Prakash to wait until tomorrow to make his decision and he replied \" no, I dont care if I get revoked. I'm signing it \". I requested for his  to revoke his provisional discharge and she is agreeable. I informed Prakash that his provider will plan to see him as scheduled tomorrow. He appears receptive.       Discharge Plan or Goal   Plan  Discharge home with outpatient supports         Care Team   Current Treatment Providers are:  Primary Physician: Becki Avery with [facility] in [place]      Psychiatrist/Medication Management:  Name/Organization: Reports he was seeing a provider at [facility] [employer] but wants to find a new provider with whom he feels a better connection with.         :  Name/Organization: Jie David  [facility] Apartment: Reports he moved into his apartment in August of this year, location is [place].  Name/Organization: [employer], owner Fatou Martinez       [facility]:  Name/Organization: Deena NEAL signed  Phone: [phone]      CADI Worker:  Name/Organization: Silvia Ham  [facility]  Phone:       Art Therapy: [facility]      Individual Therapy: [place] with [facility]      Barriers to Discharge   Ongoing stabilization    "   Referral Status  TBD     Legal Status  Voluntary; pt is committed and jarvised. CM will plan to revoke PD if pt asks for a 12 hour intent and is not stable or ready for discharge.       Next Steps:  12/18- requested  revoke PD.    Make a referral to unit(s) of [facility] ( [facility])   [phone]      Prakash would like a new psych provider- states that his  is referring. Follow up with CM to inquire who the provider will be.

## 2024-12-18 NOTE — PLAN OF CARE
Group Attendance:  attended partial group    Time session began: 1400  Time session ended: 1445  Patient's total time in group: 25    Total # Attendees   3   Group Type psychotherapeutic     Group Topic Covered emotional regulation, symptom management, and healthy coping skills     Group Session Detail Group members checked in with how they are feeling. Writer discussed the benefits of using drawing as coping skill, gave members a small piece of paper to start an exercise in doodling. Group member checked in with what they noticed during the activity.      Patient's response to the group topic/interactions:  positive affect, cooperative with task, organized, socially appropriate, safe use of materials/supplies, listened actively, expressed understanding of topic, attentive, and actively engaged     Patient Details: Pt came to group late, participated with the activity, and engaged with peers.           [id number] - Group psychotherapy - 1 Session  Patient Active Problem List   Diagnosis    ADHD (attention deficit hyperactivity disorder)    Marijuana abuse    Polysubstance abuse (H)    GERD (gastroesophageal reflux disease)    Tobacco abuse    Intractable back pain    Optic neuritis    Cauda equina syndrome with neurogenic bladder (H)    Schizoaffective disorder, bipolar type (H)    PTSD (post-traumatic stress disorder)    Anxiety    Auditory hallucination    Class 2 obesity due to excess calories without serious comorbidity with body mass index (BMI) of 36.0 to 36.9 in adult    Nephrolithiasis    Cannabis use disorder, severe, dependence (H)    Depression    History of heroin abuse (H)    Opioid use disorder, severe, dependence (H)    Substance-induced psychotic disorder with hallucinations (H)    Nausea    Urinary retention    Chronic bilateral low back pain without sciatica    AVA (generalized anxiety disorder)    Lumbosacral radiculopathy at L5    Abnormal uterine bleeding    Bipolar affective disorder, mixed,  severe, with psychotic behavior (H)    Akathisia    Hypertension, unspecified type    Female-to-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 person    Morbid obesity (H)    Mood disorder due to a general medical condition    Acne vulgaris    Type 2 diabetes mellitus with hyperglycemia, with long-term current use of insulin (H)    Herpes labialis    Major depressive disorder, 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

## 2024-12-18 NOTE — PLAN OF CARE
"Goal Outcome Evaluation:             Patient requested and received PRN Klonopin stating \"I am so anxious today because of the loudness on this unit\".  Patient also report, \"I haven't eaten anything, my stomach is in knots\".  Patient provided crackers, ginger ale and PRN Klonopin at 1926 (see MAR).      An hour later patient requested to talk to writer stating \"the voices are telling me to rip out my sutures and I really want to do it\".  Patient provided with HS medications plus PRN Melatonin per request.  Patient also given aromatherapy and fidgets.      Continues to be on SIO for risk of SIB (see order).  Unable to contract for safety.    Presents as flat/blunted and calm, depressed mood.  Endorses voices.    Patient did accept a phone call despite a note at the desk to the contrary.  After the call patient wanted this RN to notify the team \"the dog situation is taken care of\".    Patient evaluation continues.           "

## 2024-12-19 VITALS
TEMPERATURE: 97.9 F | WEIGHT: 217.4 LBS | OXYGEN SATURATION: 97 % | SYSTOLIC BLOOD PRESSURE: 143 MMHG | HEART RATE: 78 BPM | DIASTOLIC BLOOD PRESSURE: 90 MMHG | BODY MASS INDEX: 34.56 KG/M2 | RESPIRATION RATE: 16 BRPM

## 2024-12-19 LAB
GLUCOSE BLDC GLUCOMTR-MCNC: 109 MG/DL (ref 70–99)
GLUCOSE BLDC GLUCOMTR-MCNC: 110 MG/DL (ref 70–99)
VALPROATE SERPL-MCNC: 31.5 UG/ML

## 2024-12-19 PROCEDURE — A9270 NON-COVERED ITEM OR SERVICE: HCPCS | Performed by: PSYCHIATRY & NEUROLOGY

## 2024-12-19 PROCEDURE — 250N000013 HC RX MED GY IP 250 OP 250 PS 637

## 2024-12-19 PROCEDURE — 250N000013 HC RX MED GY IP 250 OP 250 PS 637: Performed by: PSYCHIATRY & NEUROLOGY

## 2024-12-19 PROCEDURE — 80164 ASSAY DIPROPYLACETIC ACD TOT: CPT | Performed by: REGISTERED NURSE

## 2024-12-19 PROCEDURE — 250N000013 HC RX MED GY IP 250 OP 250 PS 637: Performed by: REGISTERED NURSE

## 2024-12-19 PROCEDURE — 124N000002 HC R&B MH UMMC

## 2024-12-19 PROCEDURE — 99232 SBSQ HOSP IP/OBS MODERATE 35: CPT | Performed by: REGISTERED NURSE

## 2024-12-19 PROCEDURE — 250N000013 HC RX MED GY IP 250 OP 250 PS 637: Performed by: CLINICAL NURSE SPECIALIST

## 2024-12-19 PROCEDURE — 36415 COLL VENOUS BLD VENIPUNCTURE: CPT | Performed by: REGISTERED NURSE

## 2024-12-19 PROCEDURE — 97150 GROUP THERAPEUTIC PROCEDURES: CPT | Mod: GO

## 2024-12-19 RX ORDER — LORAZEPAM 2 MG/1
2 TABLET ORAL EVERY 6 HOURS PRN
Status: DISCONTINUED | OUTPATIENT
Start: 2024-12-19 | End: 2024-12-20

## 2024-12-19 RX ORDER — PALIPERIDONE 3 MG/1
3 TABLET, EXTENDED RELEASE ORAL EVERY MORNING
Status: DISCONTINUED | OUTPATIENT
Start: 2024-12-20 | End: 2024-12-19

## 2024-12-19 RX ORDER — PALIPERIDONE 3 MG/1
3 TABLET, EXTENDED RELEASE ORAL DAILY
Status: DISCONTINUED | OUTPATIENT
Start: 2024-12-19 | End: 2024-12-20

## 2024-12-19 RX ORDER — CYCLOBENZAPRINE HCL 5 MG
10 TABLET ORAL 3 TIMES DAILY PRN
Status: DISPENSED | OUTPATIENT
Start: 2024-12-19

## 2024-12-19 RX ADMIN — GABAPENTIN 600 MG: 300 CAPSULE ORAL at 19:28

## 2024-12-19 RX ADMIN — NICOTINE POLACRILEX 4 MG: 4 GUM, CHEWING BUCCAL at 18:31

## 2024-12-19 RX ADMIN — CLONAZEPAM 0.5 MG: 0.5 TABLET ORAL at 17:51

## 2024-12-19 RX ADMIN — CYCLOBENZAPRINE HYDROCHLORIDE 10 MG: 5 TABLET, FILM COATED ORAL at 10:55

## 2024-12-19 RX ADMIN — BENZOYL PEROXIDE: 50 GEL TOPICAL at 08:50

## 2024-12-19 RX ADMIN — NICOTINE POLACRILEX 4 MG: 4 GUM, CHEWING BUCCAL at 20:22

## 2024-12-19 RX ADMIN — OLANZAPINE 5 MG: 5 TABLET, ORALLY DISINTEGRATING ORAL at 09:02

## 2024-12-19 RX ADMIN — CYCLOBENZAPRINE HYDROCHLORIDE 10 MG: 5 TABLET, FILM COATED ORAL at 22:38

## 2024-12-19 RX ADMIN — ARIPIPRAZOLE 10 MG: 10 TABLET ORAL at 08:36

## 2024-12-19 RX ADMIN — VALPROIC ACID 250 MG: 250 SOLUTION ORAL at 13:57

## 2024-12-19 RX ADMIN — TESTOSTERONE 100 MG OF TESTOSTERONE: 50 GEL TRANSDERMAL at 08:35

## 2024-12-19 RX ADMIN — PALIPERIDONE 3 MG: 3 TABLET, EXTENDED RELEASE ORAL at 08:50

## 2024-12-19 RX ADMIN — VALPROIC ACID 250 MG: 250 SOLUTION ORAL at 09:12

## 2024-12-19 RX ADMIN — HYDROXYZINE HYDROCHLORIDE 75 MG: 25 TABLET ORAL at 19:36

## 2024-12-19 RX ADMIN — EMPAGLIFLOZIN 10 MG: 10 TABLET, FILM COATED ORAL at 08:36

## 2024-12-19 RX ADMIN — VALPROIC ACID 250 MG: 250 SOLUTION ORAL at 19:28

## 2024-12-19 RX ADMIN — GABAPENTIN 1200 MG: 300 CAPSULE ORAL at 08:36

## 2024-12-19 RX ADMIN — NICOTINE 1 PATCH: 21 PATCH, EXTENDED RELEASE TRANSDERMAL at 07:58

## 2024-12-19 RX ADMIN — NICOTINE POLACRILEX 4 MG: 4 GUM, CHEWING BUCCAL at 10:14

## 2024-12-19 RX ADMIN — CLONIDINE HYDROCHLORIDE 0.1 MG: 0.1 TABLET ORAL at 22:38

## 2024-12-19 RX ADMIN — INSULIN GLARGINE 5 UNITS: 100 INJECTION, SOLUTION SUBCUTANEOUS at 08:27

## 2024-12-19 RX ADMIN — GABAPENTIN 1200 MG: 300 CAPSULE ORAL at 13:57

## 2024-12-19 RX ADMIN — OLANZAPINE 5 MG: 5 TABLET, ORALLY DISINTEGRATING ORAL at 17:20

## 2024-12-19 RX ADMIN — VALACYCLOVIR 500 MG: 500 TABLET, FILM COATED ORAL at 08:37

## 2024-12-19 RX ADMIN — CLONAZEPAM 0.5 MG: 0.5 TABLET ORAL at 05:29

## 2024-12-19 RX ADMIN — NICOTINE POLACRILEX 4 MG: 4 GUM, CHEWING BUCCAL at 21:36

## 2024-12-19 RX ADMIN — SERTRALINE HYDROCHLORIDE 50 MG: 50 TABLET ORAL at 08:36

## 2024-12-19 RX ADMIN — OLANZAPINE 10 MG: 5 TABLET, ORALLY DISINTEGRATING ORAL at 21:22

## 2024-12-19 RX ADMIN — NICOTINE POLACRILEX 4 MG: 4 GUM, CHEWING BUCCAL at 06:41

## 2024-12-19 RX ADMIN — NICOTINE POLACRILEX 4 MG: 4 GUM, CHEWING BUCCAL at 14:00

## 2024-12-19 RX ADMIN — CLINDAMYCIN PHOSPHATE: 10 GEL TOPICAL at 08:51

## 2024-12-19 RX ADMIN — IBUPROFEN 400 MG: 200 TABLET, FILM COATED ORAL at 12:15

## 2024-12-19 RX ADMIN — OMEPRAZOLE 20 MG: 20 CAPSULE, DELAYED RELEASE ORAL at 08:37

## 2024-12-19 RX ADMIN — NICOTINE POLACRILEX 4 MG: 4 GUM, CHEWING BUCCAL at 16:16

## 2024-12-19 RX ADMIN — VALPROIC ACID 250 MG: 250 SOLUTION ORAL at 09:13

## 2024-12-19 RX ADMIN — AMLODIPINE BESYLATE 5 MG: 5 TABLET ORAL at 08:46

## 2024-12-19 RX ADMIN — ROSUVASTATIN 40 MG: 20 TABLET, FILM COATED ORAL at 08:36

## 2024-12-19 RX ADMIN — CLONIDINE HYDROCHLORIDE 0.1 MG: 0.1 TABLET ORAL at 04:55

## 2024-12-19 ASSESSMENT — ACTIVITIES OF DAILY LIVING (ADL)
ADLS_ACUITY_SCORE: 48
DRESS: INDEPENDENT;STREET CLOTHES
ORAL_HYGIENE: INDEPENDENT;PROMPTS
ADLS_ACUITY_SCORE: 48
HYGIENE/GROOMING: HANDWASHING;SHOWER;INDEPENDENT
ADLS_ACUITY_SCORE: 48
DRESS: STREET CLOTHES;INDEPENDENT
ADLS_ACUITY_SCORE: 48
HYGIENE/GROOMING: INDEPENDENT
ADLS_ACUITY_SCORE: 48
ORAL_HYGIENE: INDEPENDENT
ADLS_ACUITY_SCORE: 48
ADLS_ACUITY_SCORE: 48
LAUNDRY: WITH SUPERVISION
LAUNDRY: WITH SUPERVISION

## 2024-12-19 NOTE — PROGRESS NOTES
Rehab Group    Start time: 915  End time: 1005  Patient time total: 45 minutes    attended full group    #5 attended   Group Type: OT Clinic   Group Topic Covered: balanced lifestyle, coping skills, emotional regulation, healthy leisure time, problem solving, relaxation , and social skills     Group Session Detail:  Occupational Therapy Clinic group to facilitate coping skill exploration, use of cognitive skills and problem solving, creative expression, clinical observation and facilitation of social, cognitive, and kinesthetic performance skills.       Patient Response/Contribution:  cooperative with task, organized, socially appropriate, requested more information on topic, safe use of materials/supplies, listened actively, attentive, and actively engaged     Patient Detail:  Arrived to group with 1:1 staff. Pt was given and completed a written self assessment. Identified not getting sleep , hearing voices and SI x 2 weeks. Further noted experiencing: anxiety, feeling helpless, mood swings, depression, guilt, negative racing thoughts, trouble concentrating, difficulty making decisions, suicidal thoughts and gesture, and withdrawal from others. Noted he uses all 7 listed coping skills.Goals selected included: make a safety plan, problem solving skills, and manage anxiety and stress. Numerous supports listed. OT purpose was explained with a value of having involvement in tx plan, and provided options to meet self identified goals. Will assess further in the areas of organization, problem solving, and concentration.  Asked for familiar supplies and sifted through them while interacting with 1:1 staff.Assertive in making requests and asking for assistance. Calm, pleasant and focused the entire group.      [id number] OT Group (2 or more in attendance)      Patient Active Problem List   Diagnosis    ADHD (attention deficit hyperactivity disorder)    Marijuana abuse    Polysubstance abuse (H)    GERD (gastroesophageal  reflux disease)    Tobacco abuse    Intractable back pain    Optic neuritis    Cauda equina syndrome with neurogenic bladder (H)    Schizoaffective disorder, bipolar type (H)    PTSD (post-traumatic stress disorder)    Anxiety    Auditory hallucination    Class 2 obesity due to excess calories without serious comorbidity with body mass index (BMI) of 36.0 to 36.9 in adult    Nephrolithiasis    Cannabis use disorder, severe, dependence (H)    Depression    History of heroin abuse (H)    Opioid use disorder, severe, dependence (H)    Substance-induced psychotic disorder with hallucinations (H)    Nausea    Urinary retention    Chronic bilateral low back pain without sciatica    AVA (generalized anxiety disorder)    Lumbosacral radiculopathy at L5    Abnormal uterine bleeding    Bipolar affective disorder, mixed, severe, with psychotic behavior (H)    Akathisia    Hypertension, unspecified type    Female-to-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 person    Morbid obesity (H)    Mood disorder due to a general medical condition    Acne vulgaris    Type 2 diabetes mellitus with hyperglycemia, with long-term current use of insulin (H)    Herpes labialis    Major depressive disorder, 

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
## 2024-12-19 NOTE — PLAN OF CARE
Pt appeared to be quietly sleeping at the start of the night shift.  SIO staff at bedside for safety as Pt recently reported urges to rip out his sutures and has been noted to be scratching at a wound on his hand.  Wound is covered with guaze and tape.  No behavioral concerns observed this shift.  Awake at 4:45 am and can be seen talking with SIO staff as he walks the hallway.  Pt approached writer to request PRN Clonidine for hot flashes.  He remains awake and reading in his room.  Approximatley 4.75 hours of sleep is recorded.

## 2024-12-19 NOTE — PLAN OF CARE
"Brief Therapy Note    Pt presented to group, heard session topic was on values and said jokingly \"I have no values\" and left group. Did not return.  "

## 2024-12-19 NOTE — PLAN OF CARE
"Goal Outcome Evaluation:    Plan of Care Reviewed With: patient Plan of Care Reviewed With: patient    Overall Patient Progress: improvingOverall Patient Progress: improving           Problem: Adult Behavioral Health Plan of Care  Goal: Plan of Care Review  Outcome: Progressing  Flowsheets  Taken 12/19/2024 1733  Plan of Care Reviewed With: patient  Overall Patient Progress: improving  Patient Agreement with Plan of Care: agrees  Taken 12/19/2024 1643  Patient Agreement with Plan of Care: agrees    In the beginning of the shift, pt was observed resting on the floor/mattress. Upon waking up, pt was sitting on the lounge reading a book. Pt later went back to their room. When check in with pt, they endorsed depression 8/10, anxiety 7/10, auditory hallucinations \"the voices are telling me to kill myself and other negative stuffs.\" Pt endorsed pain to right foot 7/10 but declined interventions. Pt endorsed urges for SIB, pt was encouraged to come out of the room and use some coping mechanism such as reading, drawing and coloring. Pt agreed with the plans but in the process of coming out, suddenly pt bit his left wrist a size of the dime. The area was cleaned with First Aid Antiseptic and covered with TELFA. PRN Zyprexa, which was given, then later pt requested PRN Klonopin. Pt wanted to sit in OT room and watch TV because milieu was \"too loud\" for him. Around 2100 pt approached the staff and stated, \"am at the edge can I have Zyprexa?\" Pt appeared agitated, PRN Zyprexa Staff continue to re direct pt and offer support as needed. PRN nicotine gum was given throughout the shift. Pt BG before dinner was 110. Pt is eating and drinking adequately. Hygiene is appropriate to situation. Pt remains on SIO 1:1 for SIB. Pt continue to endorse urges for SIB and stated, \"the medications don't work!\" Pt was encouraged to utilize coping skills but pt wanted the writer to reach out to on call provider for more medications. On call " Provider (Dr. Clive Finch) was called and new order for Ativan 2 mg Q 6 hours PRN was received.

## 2024-12-19 NOTE — PLAN OF CARE
"  Problem: Adult Behavioral Health Plan of Care  Goal: Develops/Participates in Therapeutic [place] to Support Successful Transition  Outcome: Progressing  Intervention: Foster Therapeutic [place]  Recent Flowsheet Documentation  Taken 12/19/2024 0928 by Rosalia Fernandez RN  Trust Relationship/Rapport:   care explained   emotional support provided   choices provided   empathic listening provided   questions encouraged   questions answered   reassurance provided   thoughts/feelings acknowledged     Problem: Suicidal Behavior  Goal: Suicidal Behavior is Absent or Managed  Outcome: Progressing   Goal Outcome Evaluation:    Plan of Care Reviewed With: patient      Patients Blood Glucose was 110 this morning when checked. Patient was given 5 units of Lantus Insulin and had breakfast in the dining room.They attended group during the morning.    Patient reported that their level of depression is a 8/10 and anxiety is a 6/10. They reported that they were \"having thoughts of SI because of the voices I'm having.\" Patient did contract for safety. They denied HI and VH. Patient did request 5 mg of Zyprexa PRN for agitation. It was given at 0902 per their request. About one hour later the patient reported that it was helpful. Patient also requested and received PRN Flexeril at 1055 and they reported that it was not helpful at 1210. Patient then requested PRN Ibuprofen for 8/10 right foot pain from kicking a door. It was given per their request at 1215. A little bit later the patient approached writer and stated \"I'm having urges to self harm.\" Writer encouraged them to stay in the lounge and watch tv and told them they are safe here as they have a order for SIO and will remain with them. Patient appeared to be satisfied with this. Patient reported that the PRN Ibuprofen was helpful at decreasing foot pain to a 6/10.    Precautions: Self Injury and Suicide    SIO: 5 Feet for Suicide and Self Injury risk                 "

## 2024-12-19 NOTE — PLAN OF CARE
Goal Outcome Evaluation:    Initial meeting note:    Therapist introduced self to patient and discussed psychotherapy service available to patient.     Pt response: Pt expressed interest in meeting with therapist    Plan: Made plan to meet with pt again; began identifying goals     Pt said anxiety is better, in turn Si and SIB better. They reported some coloring and also a book from their locker, writer their own story, they were enjoying journaling when writer approached. Writer and they talked about another 1:1 therapy/ art session this week. They also talked about how they felt dance movement therapy and their rapport with that practitioner was helpful on previous admissions.

## 2024-12-19 NOTE — PLAN OF CARE

## 2024-12-19 NOTE — PLAN OF CARE
Upcoming Meetings and Appointments:   Team note: Monday     CM meeting at 12:30PM 24    Tasks Complete:  Chart review and met with team, discussed pt progress, symptomology, and response to treatment.  Discussed the discharge plan and any potential impediments to discharge    Intent to revoke received and provided to Prakash. Updates provided to CM. Hold will  @1434    outpatient appointments updated below           Discharge Plan or Goal   Plan  Discharge home with outpatient supports     Care Team   Current Treatment Providers are:  Primary Physician: Becki Avery with [facility] in [place]      Psychiatrist/Medication Management:  Name/Organization: Reports he was seeing a provider at [facility] but wants to find a new provider with whom he feels a better connection with.         :  Name/Organization: Jie David  [facility] Apartment: Reports he moved into his apartment in August of this year, location is [place].  Name/Organization: [facility], owner Fatou Martinez       [facility]:  Name/Organization: [facility]   Apt rescheduled for  @9AM with Concha corbin.      CADI Worker:  Name/Organization: Silvia Ham  [facility]  Phone:       Art Therapy: [facility]   Called and left message on 24 to reschedule.     Individual Therapy: [facility]  Apt for therapy rescheduled for - 11 AM on  with Pool Mo -         Barriers to Discharge   Ongoing stabilization      Referral Status  TBD     Legal Status  Voluntary; pt is committed and jarvised. CM will plan to revoke PD if pt asks for a 12 hour intent and is not stable or ready for discharge.       Next Steps:  - requested  revoke PD.    Make a referral to unit(s) of [facility] clinic for Interventional Psychiatry ( [facility])   [phone]      Prakash would like a new psych provider- states that his  is referring.  Follow up with CM to inquire who the provider will be.

## 2024-12-20 LAB
GLUCOSE BLDC GLUCOMTR-MCNC: 122 MG/DL (ref 70–99)
GLUCOSE BLDC GLUCOMTR-MCNC: 158 MG/DL (ref 70–99)

## 2024-12-20 PROCEDURE — 97150 GROUP THERAPEUTIC PROCEDURES: CPT | Mod: GO

## 2024-12-20 PROCEDURE — 124N000002 HC R&B MH UMMC

## 2024-12-20 PROCEDURE — 99233 SBSQ HOSP IP/OBS HIGH 50: CPT | Performed by: REGISTERED NURSE

## 2024-12-20 PROCEDURE — 250N000013 HC RX MED GY IP 250 OP 250 PS 637

## 2024-12-20 PROCEDURE — 250N000013 HC RX MED GY IP 250 OP 250 PS 637: Performed by: PSYCHIATRY & NEUROLOGY

## 2024-12-20 PROCEDURE — A9270 NON-COVERED ITEM OR SERVICE: HCPCS | Performed by: PSYCHIATRY & NEUROLOGY

## 2024-12-20 PROCEDURE — 250N000013 HC RX MED GY IP 250 OP 250 PS 637: Performed by: REGISTERED NURSE

## 2024-12-20 PROCEDURE — 250N000011 HC RX IP 250 OP 636

## 2024-12-20 PROCEDURE — 250N000013 HC RX MED GY IP 250 OP 250 PS 637: Performed by: CLINICAL NURSE SPECIALIST

## 2024-12-20 RX ORDER — CLONAZEPAM 0.5 MG/1
0.5 TABLET ORAL 2 TIMES DAILY
Status: DISCONTINUED | OUTPATIENT
Start: 2024-12-20 | End: 2024-12-20

## 2024-12-20 RX ORDER — SALIVA STIMULANT COMB. NO.3
1 SPRAY, NON-AEROSOL (ML) MUCOUS MEMBRANE 4 TIMES DAILY PRN
Status: DISPENSED | OUTPATIENT
Start: 2024-12-20

## 2024-12-20 RX ORDER — ARIPIPRAZOLE 15 MG/1
15 TABLET ORAL EVERY MORNING
Status: DISCONTINUED | OUTPATIENT
Start: 2024-12-20 | End: 2024-12-27

## 2024-12-20 RX ORDER — LORAZEPAM 0.5 MG/1
0.5 TABLET ORAL 3 TIMES DAILY PRN
Status: DISCONTINUED | OUTPATIENT
Start: 2024-12-20 | End: 2024-12-21

## 2024-12-20 RX ORDER — OLANZAPINE 5 MG/1
5-10 TABLET, ORALLY DISINTEGRATING ORAL 3 TIMES DAILY PRN
Status: DISPENSED | OUTPATIENT
Start: 2024-12-20

## 2024-12-20 RX ORDER — GABAPENTIN 600 MG/1
600 TABLET ORAL 2 TIMES DAILY
Status: COMPLETED | OUTPATIENT
Start: 2024-12-21 | End: 2024-12-24

## 2024-12-20 RX ORDER — LORAZEPAM 1 MG/1
1 TABLET ORAL 2 TIMES DAILY
Status: DISCONTINUED | OUTPATIENT
Start: 2024-12-20 | End: 2024-12-20

## 2024-12-20 RX ORDER — POLYETHYLENE GLYCOL 3350 17 G/17G
17 POWDER, FOR SOLUTION ORAL DAILY
Status: DISPENSED | OUTPATIENT
Start: 2024-12-20

## 2024-12-20 RX ORDER — CLONAZEPAM 0.5 MG/1
0.5 TABLET ORAL 2 TIMES DAILY PRN
Status: DISCONTINUED | OUTPATIENT
Start: 2024-12-20 | End: 2024-12-20

## 2024-12-20 RX ORDER — OLANZAPINE 5 MG/1
5 TABLET, ORALLY DISINTEGRATING ORAL ONCE
Status: COMPLETED | OUTPATIENT
Start: 2024-12-20 | End: 2024-12-20

## 2024-12-20 RX ORDER — GABAPENTIN 600 MG/1
1200 TABLET ORAL
Status: COMPLETED | OUTPATIENT
Start: 2024-12-21 | End: 2024-12-24

## 2024-12-20 RX ADMIN — ONDANSETRON 4 MG: 4 TABLET, ORALLY DISINTEGRATING ORAL at 15:56

## 2024-12-20 RX ADMIN — NICOTINE POLACRILEX 4 MG: 4 GUM, CHEWING BUCCAL at 17:47

## 2024-12-20 RX ADMIN — LORAZEPAM 2 MG: 2 TABLET ORAL at 00:03

## 2024-12-20 RX ADMIN — BENZOYL PEROXIDE: 50 GEL TOPICAL at 09:04

## 2024-12-20 RX ADMIN — VALACYCLOVIR 500 MG: 500 TABLET, FILM COATED ORAL at 08:57

## 2024-12-20 RX ADMIN — OLANZAPINE 5 MG: 5 TABLET, ORALLY DISINTEGRATING ORAL at 12:44

## 2024-12-20 RX ADMIN — VALPROIC ACID 250 MG: 250 SOLUTION ORAL at 09:06

## 2024-12-20 RX ADMIN — NICOTINE POLACRILEX 4 MG: 4 GUM, CHEWING BUCCAL at 14:58

## 2024-12-20 RX ADMIN — LORAZEPAM 0.5 MG: 0.5 TABLET ORAL at 18:34

## 2024-12-20 RX ADMIN — NICOTINE POLACRILEX 4 MG: 4 GUM, CHEWING BUCCAL at 11:59

## 2024-12-20 RX ADMIN — TESTOSTERONE 100 MG OF TESTOSTERONE: 50 GEL TRANSDERMAL at 08:56

## 2024-12-20 RX ADMIN — VALPROIC ACID 250 MG: 250 SOLUTION ORAL at 14:02

## 2024-12-20 RX ADMIN — VALPROIC ACID 250 MG: 250 SOLUTION ORAL at 09:16

## 2024-12-20 RX ADMIN — IBUPROFEN 400 MG: 200 TABLET, FILM COATED ORAL at 15:56

## 2024-12-20 RX ADMIN — ACETAMINOPHEN 650 MG: 325 TABLET, FILM COATED ORAL at 18:29

## 2024-12-20 RX ADMIN — GABAPENTIN 1200 MG: 300 CAPSULE ORAL at 14:02

## 2024-12-20 RX ADMIN — ROSUVASTATIN 40 MG: 20 TABLET, FILM COATED ORAL at 08:57

## 2024-12-20 RX ADMIN — LORAZEPAM 2 MG: 2 TABLET ORAL at 06:46

## 2024-12-20 RX ADMIN — INSULIN GLARGINE 5 UNITS: 100 INJECTION, SOLUTION SUBCUTANEOUS at 08:07

## 2024-12-20 RX ADMIN — SERTRALINE HYDROCHLORIDE 50 MG: 50 TABLET ORAL at 08:57

## 2024-12-20 RX ADMIN — LORAZEPAM 0.5 MG: 0.5 TABLET ORAL at 11:00

## 2024-12-20 RX ADMIN — NICOTINE 1 PATCH: 21 PATCH, EXTENDED RELEASE TRANSDERMAL at 08:04

## 2024-12-20 RX ADMIN — NICOTINE POLACRILEX 4 MG: 4 GUM, CHEWING BUCCAL at 07:50

## 2024-12-20 RX ADMIN — POLYETHYLENE GLYCOL 3350 17 G: 17 POWDER, FOR SOLUTION ORAL at 08:56

## 2024-12-20 RX ADMIN — OMEPRAZOLE 20 MG: 20 CAPSULE, DELAYED RELEASE ORAL at 08:57

## 2024-12-20 RX ADMIN — OLANZAPINE 10 MG: 5 TABLET, ORALLY DISINTEGRATING ORAL at 20:49

## 2024-12-20 RX ADMIN — CLONIDINE HYDROCHLORIDE 0.1 MG: 0.1 TABLET ORAL at 12:44

## 2024-12-20 RX ADMIN — OLANZAPINE 5 MG: 5 TABLET, ORALLY DISINTEGRATING ORAL at 11:00

## 2024-12-20 RX ADMIN — AMLODIPINE BESYLATE 5 MG: 5 TABLET ORAL at 08:58

## 2024-12-20 RX ADMIN — Medication 1 SPRAY: at 19:05

## 2024-12-20 RX ADMIN — NICOTINE POLACRILEX 4 MG: 4 GUM, CHEWING BUCCAL at 13:07

## 2024-12-20 RX ADMIN — EMPAGLIFLOZIN 10 MG: 10 TABLET, FILM COATED ORAL at 08:57

## 2024-12-20 RX ADMIN — ARIPIPRAZOLE 15 MG: 15 TABLET ORAL at 08:56

## 2024-12-20 RX ADMIN — GABAPENTIN 1200 MG: 300 CAPSULE ORAL at 08:56

## 2024-12-20 RX ADMIN — NICOTINE POLACRILEX 4 MG: 4 GUM, CHEWING BUCCAL at 22:09

## 2024-12-20 RX ADMIN — CLINDAMYCIN PHOSPHATE: 10 GEL TOPICAL at 09:04

## 2024-12-20 ASSESSMENT — ACTIVITIES OF DAILY LIVING (ADL)
ADLS_ACUITY_SCORE: 48
HYGIENE/GROOMING: INDEPENDENT
ADLS_ACUITY_SCORE: 48
ADLS_ACUITY_SCORE: 48
HYGIENE/GROOMING: INDEPENDENT
ADLS_ACUITY_SCORE: 48
DRESS: STREET CLOTHES;INDEPENDENT
ORAL_HYGIENE: INDEPENDENT
ADLS_ACUITY_SCORE: 48
ORAL_HYGIENE: INDEPENDENT
ADLS_ACUITY_SCORE: 48
LAUNDRY: WITH SUPERVISION
ADLS_ACUITY_SCORE: 48
DRESS: INDEPENDENT;STREET CLOTHES
ADLS_ACUITY_SCORE: 48

## 2024-12-20 NOTE — PLAN OF CARE
"Upcoming Meetings and Appointments:   Team note: Monday       Tasks Complete:  Chart review and met with team, discussed pt progress, symptomology, and response to treatment.  Discussed the discharge plan and any potential impediments to discharge    Prakash requested to meet 1:1. He voiced his opinion regarding his frustrations surrounding \" mixed message\". He states he feels as if he is being \" punished\". DBT skills applied ( specifically radical acceptance). He requested the phone number for his . He called and left her a vm. He also requested more information regarding his current orders. I explained to Prakash that he is not have any belongings in his room due to self harm. We discussed his impulsivity and he appears to be receptive.           Discharge Plan or Goal   Plan  Discharge home with outpatient supports     Care Team   Current Treatment Providers are:  Primary Physician: Becki Avery with [facility] in [place]      Psychiatrist/Medication Management:  Name/Organization: Reports he was seeing a provider at [facility] but wants to find a new provider with whom he feels a better connection with.         :  Name/Organization: Jie David  [facility] Apartment: Reports he moved into his apartment in August of this year, location is [place].  Name/Organization: [employer], owner Fatou Martinez       [facility]:  Name/Organization: [facility]   Apt rescheduled for 27th @9AM with Concha corbin.      CADI Worker:  Name/Organization: Silvia Ham  [facility]  Phone:       Art Therapy: [facility]   Called and left message on 12/19/24 to reschedule.     Individual Therapy: [facility]  Apt for therapy rescheduled for - 11 AM on 12/24 with Pool Mo -         Barriers to Discharge   Ongoing stabilization      Referral Status  TBD     Legal Status  Voluntary; pt is committed and jarvised. CM will plan to " revoke PD if pt asks for a 12 hour intent and is not stable or ready for discharge.       Next Steps:  12/18- requested  revoke PD.    Make a referral to unit(s) of [facility] for [place]ventional Psychiatry ( [facility])   [phone]      Prakash would like a new psych provider- states that his  is referring. Follow up with CM to inquire who the provider will be.

## 2024-12-20 NOTE — PLAN OF CARE
Pt was awake at 11:35 am and approached staff to ask if Haldol was ordered.  He reported feeling increasingly agitated as he walked about the unit.  At midnight Pt agreed to take PRN Ativan and had a small snack.    Wounds on forearm are covered.  A small bite kalyn on left hand was visible but Pt denied having done SIB recently.  SIO staff is at bedside.  Will continue to monitor.

## 2024-12-20 NOTE — PLAN OF CARE
Problem: Adult Behavioral Health Plan of Care  Goal: Optimized Coping Skills in Response to Life Stressors  Outcome: Not Progressing  Intervention: Promote Effective Coping Strategies  Recent Flowsheet Documentation  Taken 12/20/2024 0929 by Rosalia Fernandez RN  Supportive Measures:   active listening utilized   verbalization of feelings encouraged     Problem: Anxiety Signs/Symptoms  Goal: Improved Mood Symptoms (Anxiety Signs/Symptoms)  Outcome: Not Progressing  Intervention: Optimize Emotion and Mood  Recent Flowsheet Documentation  Taken 12/20/2024 0929 by Rosalia Fernandez RN  Supportive Measures:   active listening utilized   verbalization of feelings encouraged     Problem: Nonsuicidal Self-Injury  Goal: Improved Behavior Regulation and Impulse Control (Nonsuicidal Self-Injury)  Outcome: Not Progressing   Goal Outcome Evaluation:    Plan of Care Reviewed With: patient      Patient was awake and went to the dining room by 0800. BG was taken prior to breakfast and it was 158. 5 units of Lantus was given and they had breakfast in the dining room. Initially patient reported that their level of depression was a 8/10. They denied anxiety, SI, SIB, HI and hallucinations.    During team meeting the team discussed setting more restrictions as the patient has continued  self injury behavior. It was decided that the patients personal items be removed and any objects that could be used to hurt themselves. The team met with the patient to explain that these items were going to be removed today for their safety. Patient would not verbally respond to staff but rather abruptly got up and left the room and returned to their room. Patient could be heard and his assigned SIO staff reported that he began to punch the windows and walls in their room and was kicking at hard objects. Patient would not discontinue this behavior when requested to do so. A code 21 was called as the patient continued to hit walls with his  "fist and kick at them. Patient was given a couple PRN's and asked if would contract for safety. He would not verbally respond but did nod his head in a yes motion. RANDY team helped remove personal belongings and things he could hurt himself with. Patient then began to go up and down the hallway loudly stating \"Bitch Doctor\" and \"Bitches!\" Patient then went to the door that the provider was sitting by on the other side and hit and kicked the door and yelled \"Fucking die bitch!\" Patient eventually stopped this behavior and would approach a specific nurse and ask for his needs to be met. Patient was given some more PRN medications per his request. Patient then reported that he believes his right foot was broken. Provider was notified so appropriate orders could be placed to follow up on complaints of foot pain and possible fracture. Patient spent the last hour of the shift sitting in room crying loudly or sitting in common areas with his head in his hands or head down looking at floor.    Precautions: Self Injury and Suicide                 "

## 2024-12-20 NOTE — PROGRESS NOTES
Rehab Group    Start time: 1015  End time: 1130  Patient time total: 25 minutes    attended partial group    #6 attended   Group Type: occupational therapy and OT Clinic   Group Topic Covered: activity therapy, coping skills, and problem solving       Group Session Detail:  OT open clinic     Patient Response/Contribution:  safe use of materials/supplies, worked intermittently, and distracted        Patient Detail:    Pt participated in occupational therapy clinic to facilitate coping skill exploration, creative expression within personally meaningful activities, and clinical observation of social, cognitive, and kinesthetic performance skills. Pt response: Pt attended group with 1:1 staff and needed encouragement to initiate, gather materials, sequence, and adjust to workspace demands as needed. Demonstrated limited focus, planning, and problem solving for selected coloring task.  Pt was very focused on talking about what supplies he could take out of group with him.  Pt appeared distracted and irritable.  Pt completed one coloring task and then was pulled out of group to meet with someone.  When pt returned, he was very angry and sat down at table.  He then pounded his fist on the table, knocked magazines off table and stormed out of group.   Pt will continue to be encouraged to attend groups for further asssesssment and to address goals identified on plan of care.         [id number] OT Group (2 or more in attendance)      Patient Active Problem List   Diagnosis    ADHD (attention deficit hyperactivity disorder)    Marijuana abuse    Polysubstance abuse (H)    GERD (gastroesophageal reflux disease)    Tobacco abuse    Intractable back pain    Optic neuritis    Cauda equina syndrome with neurogenic bladder (H)    Schizoaffective disorder, bipolar type (H)    PTSD (post-traumatic stress disorder)    Anxiety    Auditory hallucination    Class 2 obesity due to excess calories without serious comorbidity with body mass  index (BMI) of 36.0 to 36.9 in adult    Nephrolithiasis    Cannabis use disorder, severe, dependence (H)    Depression    History of heroin abuse (H)    Opioid use disorder, severe, dependence (H)    Substance-induced psychotic disorder with hallucinations (H)    Nausea    Urinary retention    Chronic bilateral low back pain without sciatica    AVA (generalized anxiety disorder)    Lumbosacral radiculopathy at L5    Abnormal uterine bleeding    Bipolar affective disorder, mixed, severe, with psychotic behavior (H)    Akathisia    Hypertension, unspecified type    Female-to-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 person    Morbid obesity (H)    Mood disorder due to a general medical condition    Acne vulgaris    Type 2 diabetes mellitus with hyperglycemia, with long-term current use of insulin (H)    Herpes labialis    Major depressive disorder, 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

## 2024-12-20 NOTE — PROGRESS NOTES
Rehab Group     Start time: 1815  End time: 1900  Patient time total: 45 minutes     attended full group     #4 attended   Group Type: music   Group Topic Covered: balanced lifestyle, mindfulness, relaxation , self-care, and self-esteem      Group Session Detail: Cooperatively engaged in Evening Music Relaxation group to decrease anxiety and promote mindfulness.         Patient Response/Contribution:  cooperative with task     Patient Detail:  Neutrally engaged in session.  Pt asked MT during session if another provider still works her.  MT stated she did not know.  Pt asked if the other staff would know.  MT said pt could ask them.  Pt seemed more fixated on other things than group process today, but did participate without incident.        Activity Therapy Per 15 min ()    Patient Active Problem List   Diagnosis    ADHD (attention deficit hyperactivity disorder)    Marijuana abuse    Polysubstance abuse (H)    GERD (gastroesophageal reflux disease)    Tobacco abuse    Intractable back pain    Optic neuritis    Cauda equina syndrome with neurogenic bladder (H)    Schizoaffective disorder, bipolar type (H)    PTSD (post-traumatic stress disorder)    Anxiety    Auditory hallucination    Class 2 obesity due to excess calories without serious comorbidity with body mass index (BMI) of 36.0 to 36.9 in adult    Nephrolithiasis    Cannabis use disorder, severe, dependence (H)    Depression    History of heroin abuse (H)    Opioid use disorder, severe, dependence (H)    Substance-induced psychotic disorder with hallucinations (H)    Nausea    Urinary retention    Chronic bilateral low back pain without sciatica    AVA (generalized anxiety disorder)    Lumbosacral radiculopathy at L5    Abnormal uterine bleeding    Bipolar affective disorder, mixed, severe, with psychotic behavior (H)    Akathisia    Hypertension, unspecified type    Female-to-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 person    Morbid obesity (H)    Mood disorder due to a  general medical condition    Acne vulgaris    Type 2 diabetes mellitus with hyperglycemia, with long-term current use of insulin (H)    Herpes labialis    Major depressive disorder, 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

## 2024-12-20 NOTE — PLAN OF CARE
"Problem: Nonsuicidal Self-Injury  Goal: Improved Behavior Regulation and Impulse Control (Nonsuicidal Self-Injury)  Outcome: Not Progressing     Goal Outcome Evaluation:    Pt asked to speak with Charge Nurse regarding having room to door open. Pt stated, \"I have to pee with my door open now?!\" Spoke with pt in hallway about reason this is required, related to recent self harm action of biting his forearm. Pt initially said, \"Can I have a catheter? My bladder is shy.\" Pt was agreeable that this was needed with additional discussion, may need reinforcement. Pt agreed to wash arm with soap and water and re-bandage with fresh bandage (see assigned nurse notes).        "

## 2024-12-20 NOTE — PROGRESS NOTES
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 1 %    % Immature Granulocytes 0 %    NRBCs per 100 WBC 0 <1 /100    Absolute Neutrophils 4.7 1.6 - 8.3 10e3/uL    Absolute Lymphocytes 2.0 0.8 - 5.3 10e3/uL    Absolute Monocytes 0.4 0.0 - 1.3 10e3/uL    Absolute Eosinophils 0.2 0.0 - 0.7 10e3/uL    Absolute Basophils 0.0 0.0 - 0.2 10e3/uL    Absolute Immature Granulocytes 0.0 <=0.4 10e3/uL    Absolute NRBCs 0.0 10e3/uL   Glucose by meter    Collection Time: 12/15/24  8:03 AM   Result Value Ref Range    GLUCOSE BY METER POCT 135 (H) 70 - 99 mg/dL   Glucose by meter    Collection Time: 12/15/24  6:17 PM   Result Value Ref Range    GLUCOSE BY METER POCT 130 (H) 70 - 99 mg/dL   Glucose by meter    Collection Time: 12/16/24  8:18 AM   Result Value Ref Range    GLUCOSE BY METER POCT 124 (H) 70 - 99 mg/dL   Glucose by meter    Collection Time: 12/17/24  6:10 AM   Result Value Ref Range    GLUCOSE BY METER POCT 125 (H) 70 - 99 mg/dL   Glucose by meter    Collection Time: 12/17/24  4:47 PM   Result Value Ref Range    GLUCOSE BY METER POCT 107 (H) 70 - 99 mg/dL   Glucose by meter    Collection Time: 12/18/24  7:38 AM   Result  Value Ref Range    GLUCOSE BY METER POCT 145 (H) 70 - 99 mg/dL   Glucose by meter    Collection Time: 12/18/24  4:03 PM   Result Value Ref Range    GLUCOSE BY METER POCT 96 70 - 99 mg/dL   Glucose by meter    Collection Time: 12/19/24  7:52 AM   Result Value Ref Range    GLUCOSE BY METER POCT 110 (H) 70 - 99 mg/dL   Glucose by meter    Collection Time: 12/19/24  5:55 PM   Result Value Ref Range    GLUCOSE BY METER POCT 109 (H) 70 - 99 mg/dL   Valproic acid    Collection Time: 12/19/24  7:37 PM   Result Value Ref Range    Valproic acid 31.5 (L)   ug/mL       BROOKLYN Mendez, CNP     This note was created with the help of Dragon dictation system. All grammatical/typing errors or context distortion are unintentional and inherent to software.    Attestation:  Patient has been seen and evaluated by me, BROOKLYN Mendez, CNP.  I spent >75 min on the date of the encounter in chart review, patient visit, review of tests, documentation, care coordination, and/or discussion with other providers about the issues documented above.

## 2024-12-20 NOTE — PLAN OF CARE
BEH IP Unit Acuity Rating Score (UARS)  Patient is given one point for every criteria they meet.    CRITERIA SCORING   On a 72 hour hold, court hold, committed, stay of commitment, or revocation. 1    Patient LOS on BEH unit exceeds 20 days. 0  LOS: 6   Patient under guardianship, 55+, otherwise medically complex, or under age 11. 0   Suicide ideation without relief of precipitating factors. 1   Current plan for suicide. 1   Current plan for homicide. 0   Imminent risk or actual attempt to seriously harm another without relief of factors precipitating the attempt. 0   Severe dysfunction in daily living (ex: complete neglect for self care, extreme disruption in vegetative function, extreme deterioration in social interactions). 1   Recent (last 7 days) or current physical aggression in the ED or on unit. 0   Restraints or seclusion episode in past 72 hours. 0   Recent (last 7 days) or current verbal aggression, agitation, yelling, etc., while in the ED or unit. 0   Active psychosis. 1   Need for constant or near constant redirection (from leaving, from others, etc).  0   Intrusive or disruptive behaviors. 0   Patient requires 3 or more hours of individualized nursing care per 8-hour shift (i.e. for ADLs, meds, therapeutic interventions). 0   TOTAL 4

## 2024-12-20 NOTE — PLAN OF CARE
Prakash appeared to be sleeping shortly after taking PRN Ativan. Approximately 5 hours of sleep is recorded tonight.  SIO staff at bedside for safety.  No SIB observed this shift.  His wounds remain covered.

## 2024-12-20 NOTE — PLAN OF CARE
Pt is awake at 6:20 am and approached writer at 6:40 am to request PRN Ativan for c/o increasing anxiety.

## 2024-12-21 LAB — GLUCOSE BLDC GLUCOMTR-MCNC: 80 MG/DL (ref 70–99)

## 2024-12-21 PROCEDURE — 250N000013 HC RX MED GY IP 250 OP 250 PS 637

## 2024-12-21 PROCEDURE — 99231 SBSQ HOSP IP/OBS SF/LOW 25: CPT | Performed by: NURSE PRACTITIONER

## 2024-12-21 PROCEDURE — 250N000013 HC RX MED GY IP 250 OP 250 PS 637: Performed by: NURSE PRACTITIONER

## 2024-12-21 PROCEDURE — 250N000013 HC RX MED GY IP 250 OP 250 PS 637: Performed by: PSYCHIATRY & NEUROLOGY

## 2024-12-21 PROCEDURE — 124N000002 HC R&B MH UMMC

## 2024-12-21 PROCEDURE — 250N000011 HC RX IP 250 OP 636

## 2024-12-21 PROCEDURE — 250N000013 HC RX MED GY IP 250 OP 250 PS 637: Performed by: CLINICAL NURSE SPECIALIST

## 2024-12-21 PROCEDURE — 250N000013 HC RX MED GY IP 250 OP 250 PS 637: Performed by: REGISTERED NURSE

## 2024-12-21 RX ORDER — LORAZEPAM 0.5 MG/1
0.5 TABLET ORAL 3 TIMES DAILY PRN
Status: DISCONTINUED | OUTPATIENT
Start: 2024-12-21 | End: 2024-12-27

## 2024-12-21 RX ORDER — HYDROXYZINE HYDROCHLORIDE 25 MG/1
25-50 TABLET, FILM COATED ORAL 3 TIMES DAILY PRN
Status: DISPENSED | OUTPATIENT
Start: 2024-12-21

## 2024-12-21 RX ORDER — DOXYCYCLINE 100 MG/1
100 CAPSULE ORAL EVERY 12 HOURS SCHEDULED
Status: DISCONTINUED | OUTPATIENT
Start: 2024-12-21 | End: 2024-12-23

## 2024-12-21 RX ORDER — HYDROXYZINE HYDROCHLORIDE 50 MG/1
50 TABLET, FILM COATED ORAL ONCE
Status: COMPLETED | OUTPATIENT
Start: 2024-12-21 | End: 2024-12-21

## 2024-12-21 RX ADMIN — AMLODIPINE BESYLATE 5 MG: 5 TABLET ORAL at 11:15

## 2024-12-21 RX ADMIN — VALPROIC ACID 500 MG: 250 SOLUTION ORAL at 20:41

## 2024-12-21 RX ADMIN — NICOTINE POLACRILEX 4 MG: 4 GUM, CHEWING BUCCAL at 14:58

## 2024-12-21 RX ADMIN — OLANZAPINE 10 MG: 5 TABLET, ORALLY DISINTEGRATING ORAL at 12:55

## 2024-12-21 RX ADMIN — VALPROIC ACID 250 MG: 250 SOLUTION ORAL at 13:54

## 2024-12-21 RX ADMIN — ARIPIPRAZOLE 15 MG: 15 TABLET ORAL at 11:16

## 2024-12-21 RX ADMIN — ONDANSETRON 4 MG: 4 TABLET, ORALLY DISINTEGRATING ORAL at 12:25

## 2024-12-21 RX ADMIN — VALPROIC ACID 250 MG: 250 SOLUTION ORAL at 11:21

## 2024-12-21 RX ADMIN — HYDROXYZINE HYDROCHLORIDE 75 MG: 25 TABLET ORAL at 20:39

## 2024-12-21 RX ADMIN — GABAPENTIN 600 MG: 600 TABLET, FILM COATED ORAL at 20:38

## 2024-12-21 RX ADMIN — OMEPRAZOLE 20 MG: 20 CAPSULE, DELAYED RELEASE ORAL at 11:18

## 2024-12-21 RX ADMIN — ACETAMINOPHEN 650 MG: 325 TABLET, FILM COATED ORAL at 23:56

## 2024-12-21 RX ADMIN — VALPROIC ACID 500 MG: 250 SOLUTION ORAL at 00:28

## 2024-12-21 RX ADMIN — LORAZEPAM 0.5 MG: 0.5 TABLET ORAL at 02:30

## 2024-12-21 RX ADMIN — NICOTINE POLACRILEX 4 MG: 4 GUM, CHEWING BUCCAL at 22:02

## 2024-12-21 RX ADMIN — DICLOFENAC SODIUM TOPICAL GEL, 1% 2 G: 10 GEL TOPICAL at 22:30

## 2024-12-21 RX ADMIN — POLYETHYLENE GLYCOL 3350 17 G: 17 POWDER, FOR SOLUTION ORAL at 11:18

## 2024-12-21 RX ADMIN — OLANZAPINE 10 MG: 5 TABLET, ORALLY DISINTEGRATING ORAL at 20:39

## 2024-12-21 RX ADMIN — CLONIDINE HYDROCHLORIDE 0.1 MG: 0.1 TABLET ORAL at 11:34

## 2024-12-21 RX ADMIN — ROSUVASTATIN 40 MG: 20 TABLET, FILM COATED ORAL at 11:18

## 2024-12-21 RX ADMIN — NICOTINE POLACRILEX 4 MG: 4 GUM, CHEWING BUCCAL at 12:25

## 2024-12-21 RX ADMIN — EMPAGLIFLOZIN 10 MG: 10 TABLET, FILM COATED ORAL at 11:16

## 2024-12-21 RX ADMIN — NICOTINE POLACRILEX 4 MG: 4 GUM, CHEWING BUCCAL at 06:51

## 2024-12-21 RX ADMIN — VALACYCLOVIR 500 MG: 500 TABLET, FILM COATED ORAL at 11:21

## 2024-12-21 RX ADMIN — DOXYCYCLINE HYCLATE 100 MG: 100 CAPSULE ORAL at 20:40

## 2024-12-21 RX ADMIN — NICOTINE POLACRILEX 4 MG: 4 GUM, CHEWING BUCCAL at 09:24

## 2024-12-21 RX ADMIN — NICOTINE POLACRILEX 4 MG: 4 GUM, CHEWING BUCCAL at 18:12

## 2024-12-21 RX ADMIN — IBUPROFEN 400 MG: 200 TABLET, FILM COATED ORAL at 22:30

## 2024-12-21 RX ADMIN — LORAZEPAM 0.5 MG: 0.5 TABLET ORAL at 09:31

## 2024-12-21 RX ADMIN — SERTRALINE HYDROCHLORIDE 50 MG: 50 TABLET ORAL at 11:21

## 2024-12-21 RX ADMIN — NICOTINE 1 PATCH: 21 PATCH, EXTENDED RELEASE TRANSDERMAL at 09:23

## 2024-12-21 RX ADMIN — LORAZEPAM 0.5 MG: 0.5 TABLET ORAL at 19:26

## 2024-12-21 RX ADMIN — HYDROXYZINE HYDROCHLORIDE 75 MG: 25 TABLET ORAL at 00:19

## 2024-12-21 RX ADMIN — HYDROXYZINE HYDROCHLORIDE 50 MG: 50 TABLET ORAL at 14:38

## 2024-12-21 RX ADMIN — NICOTINE POLACRILEX 4 MG: 4 GUM, CHEWING BUCCAL at 19:56

## 2024-12-21 ASSESSMENT — ACTIVITIES OF DAILY LIVING (ADL)
ADLS_ACUITY_SCORE: 48
HYGIENE/GROOMING: INDEPENDENT
ADLS_ACUITY_SCORE: 48
ORAL_HYGIENE: INDEPENDENT
ADLS_ACUITY_SCORE: 48

## 2024-12-21 NOTE — PLAN OF CARE
"Goal Outcome Evaluation:    Plan of Care Reviewed With: patient        Pt seen by IM service for rt foot pain and self inflicted bite kalyn . Area to left forearm is reddened and has a yellow drainage with erythema. I have redressed area with telfa x2 this shift but pt keeps taking the dressing off saying \"it itches\" . Refuses bandage dressing. Im provider to write new orders for antibiotics and WOC nurse consult.       Problem: Anxiety Signs/Symptoms  Goal: Improved Mood Symptoms (Anxiety Signs/Symptoms)  Outcome: Not Progressing   Pt now having urge to kick or bite or \"hurt my self in some way\". Expresses anger he cannot control. Pt asked to speak with RANDY team. Pt encouraged for asking for help before hurting self. RANDY  team came to unit speaking with patient. Pt seems to respond well to this.      Refused gabapentin again. SIB urges persist. Pt asking if I will call on call provider to get him more for anxiety.  I told pt I will ask for hydroxyzine as it is already ordered for him at  but I will not ask for more Ativan as this has already been clarified that he is on the dose the provider wants him to be on. KELIN Pa notified of continuing SI and SIB. Pt given now dose of Hydroxyzine 50 mg.     Pt remains on one to one SIO monitoring for SI and SIB. Pt unable to contract to be safe at this time. Pt has been allowed to use his journal and sticker book but only while in the lounge.       "

## 2024-12-21 NOTE — PLAN OF CARE
Called [facility] and inquired if they can send us the Ashley order for pt that was issued on 11/14/24. He will check with his supervisor to verify they can send it. They will call the unit and follow up with the RN about how to get it to us if so.    Updated RN.

## 2024-12-21 NOTE — PLAN OF CARE
Problem: Sleep Disturbance  Goal: Adequate Sleep/Rest  Outcome: Not Progressing     Goal Outcome Evaluation:    Patient a wake at start of the shift sitting at the lounge with the SIO staff. Pt did some coloring and reading. At a round 0019 requested and given HS medications Valproic acid and hydroxyzine. Pt continued coloring and later, he went back to his room. While in the room he was punching on the wall multiple times. Pt was encouraged and agreed to come at the lounge. He did some coloring and read a book, SIO staff was with the pt for safety at all times. At around 0230 pt brought the makers and books to the nurse desk. Received PRN Ativan and went back to his room. He has been observed sleeping during safety rounds check. Breathing quiet and unlabored. Pt slept 4 hours.

## 2024-12-21 NOTE — PLAN OF CARE
"  Problem: Adult Behavioral Health Plan of Care  Goal: Develops/Participates in Therapeutic [place] to Support Successful Transition  Outcome: Not Progressing   Goal Outcome Evaluation:       I approached pt to ask if I could check his blood sugar. Pt refused. I explained I could not give his morning insulin unless I checked his morning blood sugar. Pt said \"I am not going to take any of my medications today\". When I asked why, pt states \"because I don't want to\"   I explained I felt him not taking his medications would just make him feel worse. Pt said he still would not take them.  Pt then went back to his room, and went back to his bed. Pt has been avoidant, withdrawn, even refusing vital signs. Pt has angry affect, not looking at staff. I will let pt rest and try again later. Pt remains on one to one SIO.                    "

## 2024-12-21 NOTE — PLAN OF CARE
"  Problem: Anxiety Signs/Symptoms  Goal: Improved Mood Symptoms (Anxiety Signs/Symptoms)  Outcome: Not Progressing   Goal Outcome Evaluation:    Plan of Care Reviewed With: patient        Pt came out of room for brief time. Agreed to apply new nicotine patch but still refusing all other scheduled medications. Pt does admit to anxiety, depression. Pt asked for and given prn ativan for \"bad anxiety\".   Pt reports rt foot pain at a 8/10. IM reports they will see patient today.     At 1115 am Prakash and I had a talk about his refusing cares and medications. I again tried to express my concern that his voices, his urges to hurt himself and his overall well being was only being made worse by not taking his medications. Pt has been expressing that he wants to leave the hospital all morning. Pt expressing urges to hurt and kill himself. I expressed that he would only be hospitalized longer and have a more difficult time if he prolonged care. Pt is teary, feeling hopeless and powerless. Angry that his belongings have restrictions to be used only in milieu. After long conversation pt did agree to take his Abilify and most of his morning medications. Still refused insulin, testosterone, and gabapentin.     Please note pt has not eaten all shift. Having some nausea now, perhaps because he took  his meds on an empty stomach against nurses advice. Refuses blood sugar check. Did allow vitals. Zofran given. Is drinking some juice and water at least after encouragement.     Pt upset over \"low dose ativan not working\". Pt does have a flushed red face and is anxious. Prn clonidine given for hot flash/anxiety.   SIO at bedside.            "

## 2024-12-21 NOTE — PLAN OF CARE
"Pt irritable at beginning of shift. Pt sitting in dining area and asked to speak with this writer at change of shift. Pt asked about PRN ativan times and times explained. Pt pacing at times and appeared to have angry affect. Pt requested to take his evening medications around midnight and medications  administered. Pt sat in lounge coloring and then returned to his room. Pt upset in room hitting and kicking wall a couple times. Pt stated he was upset because \"life is hell and he has nothing to do. Pt asked if he could color in lounge with light on and listen to headphones. Pt agreeable to not hit and talk to staff if frustrated. Pt currently sitting in lounge listening to headphones and coloring. SIO in place for safety.       "

## 2024-12-21 NOTE — PLAN OF CARE
Goal Outcome Evaluation:    Plan of Care Reviewed With: patient Plan of Care Reviewed With: patient    Overall Patient Progress: no changeOverall Patient Progress: no change      Problem: Suicide Risk  Goal: Absence of Self-Harm  Intervention: Assess Risk to Self and Maintain Safety  Recent Flowsheet Documentation  Taken 12/20/2024 1843 by Medardo Guerrero RN  Behavior Management:   behavioral plan reviewed   impulse control promoted   boundaries reinforced  Self-Harm Prevention:   environmental self-harm risks assessed   observed one-to-one  Enhanced Safety Measures:  at bedside  Taken 12/20/2024 1800 by Medardo Guerrero RN  Behavior Management:   behavioral plan reviewed   impulse control promoted   boundaries reinforced  Self-Harm Prevention:   environmental self-harm risks assessed   observed one-to-one  Enhanced Safety Measures:  at bedside     Patient presented a labile mood, flat blunted affect, anxious and irritable. Patient acknowledged auditory hallucinations commanding him to hurt self. Patient endorsed anxiety and depression both at 8/10, passive SI&SIB. Patient requested and was given multiple PRNs with somewhat effectiveness. Endorsed foot pain, was given prn Ibuprofen, Zofran for nausea. Prn tylenol and Ativan. At about 8.30pm patient appeared agitated and demanded to be discharged, educated that it wont be possible tonight and patient walked to room and was banging on the wall while laying down on the mattress in the floor. Patient declined scheduled medications verbalizing that the only medication that works was 2MG Ativan. Patient was offered prn Zyprexa 10mg and was reluctantly agreeable indicating that it never works. Patient verbalized that taking away all privileges and not being allowed to take things to their room agitated them. Patient was offered Nitendo switch but reported that it did not have games in it.   Patient came out to the desk and asked to speak to  "RN, when I approached patient requested to have another RN since \" I didn't do shit\". Two other RNs approached and patient reiterated that they needed 2MG of Ativan, when told that they didn't have an order for the same, patient walked back to their room and slammed the door so hard. RANDY team was called at around 2118hrs. Patient is calm in the room with RANDY RN but declines scheduled HS medications.   Patient BG at dinner time was 122      "

## 2024-12-21 NOTE — PLAN OF CARE
Goal Outcome Evaluation:          UPDATE.. no update from Bear Jacobson (LALA) on Ashley paperwork at this time.

## 2024-12-22 LAB
ALBUMIN UR-MCNC: NEGATIVE MG/DL
APPEARANCE UR: CLEAR
BILIRUB UR QL STRIP: NEGATIVE
COLOR UR AUTO: ABNORMAL
GLUCOSE BLDC GLUCOMTR-MCNC: 152 MG/DL (ref 70–99)
GLUCOSE BLDC GLUCOMTR-MCNC: 178 MG/DL (ref 70–99)
GLUCOSE UR STRIP-MCNC: >=1000 MG/DL
HGB UR QL STRIP: NEGATIVE
KETONES UR STRIP-MCNC: NEGATIVE MG/DL
LEUKOCYTE ESTERASE UR QL STRIP: ABNORMAL
NITRATE UR QL: NEGATIVE
PH UR STRIP: 7 [PH] (ref 5–7)
RBC URINE: 0 /HPF
SP GR UR STRIP: 1.01 (ref 1–1.03)
SQUAMOUS EPITHELIAL: 1 /HPF
UROBILINOGEN UR STRIP-MCNC: NORMAL MG/DL
WBC URINE: 1 /HPF

## 2024-12-22 PROCEDURE — 90834 PSYTX W PT 45 MINUTES: CPT | Performed by: COUNSELOR

## 2024-12-22 PROCEDURE — 250N000013 HC RX MED GY IP 250 OP 250 PS 637: Performed by: REGISTERED NURSE

## 2024-12-22 PROCEDURE — 250N000013 HC RX MED GY IP 250 OP 250 PS 637: Performed by: PSYCHIATRY & NEUROLOGY

## 2024-12-22 PROCEDURE — 250N000011 HC RX IP 250 OP 636

## 2024-12-22 PROCEDURE — 250N000013 HC RX MED GY IP 250 OP 250 PS 637

## 2024-12-22 PROCEDURE — A9270 NON-COVERED ITEM OR SERVICE: HCPCS | Performed by: PSYCHIATRY & NEUROLOGY

## 2024-12-22 PROCEDURE — 81003 URINALYSIS AUTO W/O SCOPE: CPT | Performed by: NURSE PRACTITIONER

## 2024-12-22 PROCEDURE — 250N000013 HC RX MED GY IP 250 OP 250 PS 637: Performed by: CLINICAL NURSE SPECIALIST

## 2024-12-22 PROCEDURE — 250N000013 HC RX MED GY IP 250 OP 250 PS 637: Performed by: NURSE PRACTITIONER

## 2024-12-22 PROCEDURE — 124N000002 HC R&B MH UMMC

## 2024-12-22 RX ORDER — DOCUSATE SODIUM 100 MG/1
100 CAPSULE, LIQUID FILLED ORAL 2 TIMES DAILY
Status: DISPENSED | OUTPATIENT
Start: 2024-12-22

## 2024-12-22 RX ADMIN — DOCUSATE SODIUM 100 MG: 100 CAPSULE, LIQUID FILLED ORAL at 10:13

## 2024-12-22 RX ADMIN — NICOTINE POLACRILEX 4 MG: 4 GUM, CHEWING BUCCAL at 12:06

## 2024-12-22 RX ADMIN — VALPROIC ACID 500 MG: 250 SOLUTION ORAL at 20:55

## 2024-12-22 RX ADMIN — INSULIN GLARGINE 5 UNITS: 100 INJECTION, SOLUTION SUBCUTANEOUS at 07:54

## 2024-12-22 RX ADMIN — LORAZEPAM 0.5 MG: 0.5 TABLET ORAL at 06:26

## 2024-12-22 RX ADMIN — POLYETHYLENE GLYCOL 3350 17 G: 17 POWDER, FOR SOLUTION ORAL at 08:19

## 2024-12-22 RX ADMIN — NICOTINE POLACRILEX 4 MG: 4 GUM, CHEWING BUCCAL at 16:57

## 2024-12-22 RX ADMIN — DOCUSATE SODIUM 100 MG: 100 CAPSULE, LIQUID FILLED ORAL at 20:55

## 2024-12-22 RX ADMIN — DOXYCYCLINE HYCLATE 100 MG: 100 CAPSULE ORAL at 20:54

## 2024-12-22 RX ADMIN — Medication 1 SPRAY: at 18:44

## 2024-12-22 RX ADMIN — NICOTINE POLACRILEX 4 MG: 4 GUM, CHEWING BUCCAL at 06:33

## 2024-12-22 RX ADMIN — CYCLOBENZAPRINE HYDROCHLORIDE 10 MG: 5 TABLET, FILM COATED ORAL at 11:07

## 2024-12-22 RX ADMIN — VALPROIC ACID 250 MG: 250 SOLUTION ORAL at 13:23

## 2024-12-22 RX ADMIN — OLANZAPINE 10 MG: 5 TABLET, ORALLY DISINTEGRATING ORAL at 22:42

## 2024-12-22 RX ADMIN — NICOTINE POLACRILEX 4 MG: 4 GUM, CHEWING BUCCAL at 07:58

## 2024-12-22 RX ADMIN — GABAPENTIN 600 MG: 600 TABLET, FILM COATED ORAL at 08:19

## 2024-12-22 RX ADMIN — ARIPIPRAZOLE 15 MG: 15 TABLET ORAL at 08:18

## 2024-12-22 RX ADMIN — HYDROXYZINE HYDROCHLORIDE 75 MG: 25 TABLET ORAL at 20:55

## 2024-12-22 RX ADMIN — ACETAMINOPHEN 650 MG: 325 TABLET, FILM COATED ORAL at 05:37

## 2024-12-22 RX ADMIN — ONDANSETRON 4 MG: 4 TABLET, ORALLY DISINTEGRATING ORAL at 06:26

## 2024-12-22 RX ADMIN — LORAZEPAM 0.5 MG: 0.5 TABLET ORAL at 12:27

## 2024-12-22 RX ADMIN — DICLOFENAC SODIUM TOPICAL GEL, 1% 2 G: 10 GEL TOPICAL at 05:42

## 2024-12-22 RX ADMIN — CYCLOBENZAPRINE HYDROCHLORIDE 10 MG: 5 TABLET, FILM COATED ORAL at 18:02

## 2024-12-22 RX ADMIN — GABAPENTIN 600 MG: 600 TABLET, FILM COATED ORAL at 20:54

## 2024-12-22 RX ADMIN — EMPAGLIFLOZIN 10 MG: 10 TABLET, FILM COATED ORAL at 08:19

## 2024-12-22 RX ADMIN — NICOTINE POLACRILEX 4 MG: 4 GUM, CHEWING BUCCAL at 18:44

## 2024-12-22 RX ADMIN — CLINDAMYCIN PHOSPHATE: 10 GEL TOPICAL at 09:16

## 2024-12-22 RX ADMIN — OMEPRAZOLE 20 MG: 20 CAPSULE, DELAYED RELEASE ORAL at 07:57

## 2024-12-22 RX ADMIN — HYDROXYZINE HYDROCHLORIDE 50 MG: 25 TABLET ORAL at 10:45

## 2024-12-22 RX ADMIN — AMOXICILLIN AND CLAVULANATE POTASSIUM 1 TABLET: 875; 125 TABLET, FILM COATED ORAL at 12:06

## 2024-12-22 RX ADMIN — OLANZAPINE 10 MG: 5 TABLET, ORALLY DISINTEGRATING ORAL at 15:53

## 2024-12-22 RX ADMIN — NICOTINE POLACRILEX 4 MG: 4 GUM, CHEWING BUCCAL at 15:52

## 2024-12-22 RX ADMIN — ROSUVASTATIN 40 MG: 20 TABLET, FILM COATED ORAL at 08:19

## 2024-12-22 RX ADMIN — GABAPENTIN 1200 MG: 600 TABLET, FILM COATED ORAL at 13:21

## 2024-12-22 RX ADMIN — DICLOFENAC SODIUM TOPICAL GEL, 1% 2 G: 10 GEL TOPICAL at 11:19

## 2024-12-22 RX ADMIN — AMOXICILLIN AND CLAVULANATE POTASSIUM 1 TABLET: 875; 125 TABLET, FILM COATED ORAL at 20:54

## 2024-12-22 RX ADMIN — ACETAMINOPHEN 650 MG: 325 TABLET, FILM COATED ORAL at 18:02

## 2024-12-22 RX ADMIN — TESTOSTERONE 100 MG OF TESTOSTERONE: 50 GEL TRANSDERMAL at 08:06

## 2024-12-22 RX ADMIN — VALPROIC ACID 250 MG: 250 SOLUTION ORAL at 08:21

## 2024-12-22 RX ADMIN — DOXYCYCLINE HYCLATE 100 MG: 100 CAPSULE ORAL at 08:18

## 2024-12-22 RX ADMIN — NICOTINE POLACRILEX 4 MG: 4 GUM, CHEWING BUCCAL at 21:02

## 2024-12-22 RX ADMIN — NICOTINE 1 PATCH: 21 PATCH, EXTENDED RELEASE TRANSDERMAL at 07:54

## 2024-12-22 RX ADMIN — SERTRALINE HYDROCHLORIDE 50 MG: 50 TABLET ORAL at 08:21

## 2024-12-22 RX ADMIN — IBUPROFEN 400 MG: 200 TABLET, FILM COATED ORAL at 12:38

## 2024-12-22 RX ADMIN — NICOTINE POLACRILEX 4 MG: 4 GUM, CHEWING BUCCAL at 10:46

## 2024-12-22 RX ADMIN — BENZOYL PEROXIDE: 50 GEL TOPICAL at 09:16

## 2024-12-22 RX ADMIN — VALACYCLOVIR 500 MG: 500 TABLET, FILM COATED ORAL at 08:21

## 2024-12-22 RX ADMIN — Medication 1 SPRAY: at 07:58

## 2024-12-22 RX ADMIN — AMLODIPINE BESYLATE 5 MG: 5 TABLET ORAL at 08:22

## 2024-12-22 RX ADMIN — ALUMINUM HYDROXIDE, MAGNESIUM HYDROXIDE, AND SIMETHICONE 30 ML: 200; 200; 20 SUSPENSION ORAL at 07:42

## 2024-12-22 RX ADMIN — LORAZEPAM 0.5 MG: 0.5 TABLET ORAL at 18:44

## 2024-12-22 RX ADMIN — NICOTINE POLACRILEX 4 MG: 4 GUM, CHEWING BUCCAL at 22:19

## 2024-12-22 RX ADMIN — NICOTINE POLACRILEX 4 MG: 4 GUM, CHEWING BUCCAL at 14:09

## 2024-12-22 ASSESSMENT — ACTIVITIES OF DAILY LIVING (ADL)
ADLS_ACUITY_SCORE: 48
ADLS_ACUITY_SCORE: 48
HYGIENE/GROOMING: INDEPENDENT
ADLS_ACUITY_SCORE: 48
ORAL_HYGIENE: INDEPENDENT
ADLS_ACUITY_SCORE: 48
DRESS: INDEPENDENT
ADLS_ACUITY_SCORE: 48

## 2024-12-22 NOTE — PLAN OF CARE
"Goal Outcome Evaluation:       Pt presents with a much different demeanor today. Pt is pleasant, cooperative. Pt reports he is over his frustration of past events of late last week and is going forward with a positive attitude. Pt was medication compliant, much more patient with staff and overall calmer and less frustrated.     Pt still having some intermittent thoughts of suicide and SIB urges but has not acted on them and is working on DBT and distraction skills to keep on a safe pathway to \"getting out of the hospital\". Pt was asked to stay out of his room when he is thinking or having urges of SIB and pt agreed and even recommended we lock his door as a deterrent. Does better when out in milieu keeping busy.  Pt remains on an SIO for safety. Please see below for prn medications given for anxiety.    Pt says he woke up this morning with a new abdominal soreness. Rates it as 7/10. Pt holding stomach in pain. Describes it as\" in my lower abdomen.\"  Some constipation \"but had a small bm this morning\". Also having some urinary urgency. \"I feel like I have to urinate a lot.\"  Pt denies dysuria. Pt received Omeprazole, Maalox without relief from abdominal pain.( Pt had not eaten much yesterday due to his anger, so I thought this might help) but pain persisted. Call placed to IM provider. UA sent. Urine clear tiago. Pt started on Colace. Abdominal pain improved by afternoon and rates it as a \"burning\" now in lower abdomen but much less painful.   Note...  Pt was started on Doxycycline yesterday but pt reports he has used this medication several times in the past without any abdominal distress. Will monitor. Newly started on Augmentin this afternoon but that was after the abdominal complaints.       Left arm (self inflicted bite) wound less red today, starting to scab over. Tried new dressing today of gauze and tegaderm to see if pt will leave it on. Pt stated the other telfa and gauze dressings I used yesterday itched " "too much. Awaiting WOC consult. Pt also has healing stab sites to left arm (sutures intact)  Pt has left his dressing on all shift. No picking or sabotage of dressing or wound noted.       Rt foot pain... Voltaren ointment, orthotics consult pending.  Back pain......flexeril x1, Ibuprofen x1   Anxiety........... Hydroxyzine x1 , Ativan x 1    Ate 100% of both meals and was medication compliant today. . Pt states \"I was just angry yesterday and that is why I did not want to eat or take some of my meds\". Pt given encouragement for making good decisions today.        "

## 2024-12-22 NOTE — PROGRESS NOTES
Brief Medicine Progress Note   21 December 2024       Internal medicine contacted by primary team regarding right foot pain and self inflicted bite wound.  Pt has repeatedly engaged in self injurious behavior by kicking doors.  Patient unable to contract for safety and with escalating behaviors as well.  RANDY team contacted today and present in pt's room on arrival.  On exam, dorsal aspect of R foot with trace swelling and small bruising, no erythema.  Slight tenderness to touch.  Able to bear weight but painful.  Left arm with half dollar-sized round bite wound on inside of left wrist with serous drainage, bruising, and erythema.  Pt reports that she did break the skin and it was bleeding the day before.  No fevers or chills.      Plan:   - Orthotics consult for supportive footwear   - Augmentin + Doxycycline x 7 days   - WOCN consult   - Immunizations review, up to date on TD vax (9/13/24)    - Defer XR at this time as patient cannot contract for safety and would be high risk to leave psych unit (elopement, self harm)     Medicine will continue to follow along.  Recommendations relayed to primary team via this progress note.  Thank you for the opportunity to be involved in this patient's care.    35 MINUTES SPENT BY ME on the date of service doing chart review, history, exam, documentation & further activities per the note.        Sydney Subramanian, CNP, APRN  Internal [facility]  [facility]  Available on [employer]

## 2024-12-22 NOTE — PLAN OF CARE
Problem: Sleep Disturbance  Goal: Adequate Sleep/Rest  Outcome: Not Progressing     Goal Outcome Evaluation:    Patient awake at start of the shift, sitting at the lounge doing some coloring. Pt continues on SIO for SI and SIB. Pt c/o pain on the right foot, requested and received PRN Tylenol 650 mg at 2356. Around 0105 pt brought the makers to the nurse desk and went back to his room. Pt appeared sleeping shortly after going back to his room. Breathing quiet and unlabored. SIO staff sitting on the bedside for safety at all times. At 0430 pt was up, came to the nurse desk and asked for makers and headphone. Currently Pt is sitting at the lounge coloring and listening to music on his headphone. At 0537 requested and received tylenol and at 0542 received Voltaren 1% topical gel for right foot pain. Pt paced back and forty on the hallway. At 0626 requested ativan for anxiety, Zofran for nausea and nicotine gum at 0633 for withdrawal symptoms. No SIB observed. Pt slept 3.5 hours.

## 2024-12-22 NOTE — PLAN OF CARE
Pt was having a difficult day, a lot of self harm due to losing privileges due to not being able to stay safe on unit.     Writer did talk with team and in late afternoon pt was given by writer, one book to read and journal to write in, in lounge only. These itesms need to be returned to brown paper bags in back room. He cannot have personal items in his room due to behaviors. Writer indicated writer would check in with pt on Sunday. It is determined that too much 1:1 attention is not clinically advised due to pt doing attention seeking with self harm behaviors. Writer was asked by team later day Friday to put together a behavioral plan .Writer will do this and share with the team on Monday.

## 2024-12-22 NOTE — PLAN OF CARE
Problem: Suicidal Behavior  Goal: Suicidal Behavior is Absent or Managed  Outcome: Progressing     Problem: Suicide Risk  Goal: Absence of Self-Harm  Intervention: Assess Risk to Self and Maintain Safety  Recent Flowsheet Documentation  Taken 12/21/2024 2100 by Gustavo Marte RN  Behavior Management:   behavioral plan reviewed   impulse control promoted   boundaries reinforced  Self-Harm Prevention:   environmental self-harm risks assessed   observed one-to-one  Intervention: Promote Psychosocial Wellbeing  Recent Flowsheet Documentation  Taken 12/21/2024 2100 by Gustavo Marte RN  Supportive Measures:   active listening utilized   verbalization of feelings encouraged  Sleep/Rest Enhancement: consistent schedule promoted  Family/Support System Care:   self-care encouraged   support provided   presence promoted   involvement promoted   Goal Outcome Evaluation:    Plan of Care Reviewed With: patient      Pt Remains on SIO for SI and SIB    Pt split time between the milieu and his room. Pt requested and received PRN Ativan and Zyprexa. Nicotine gum X 2. PRN Ibuprofen and Voltaren. Pt has been tensed, irritated, Frequent complaints, agitated and on the edge most of the shift but has not had any big outburst.     BG prior to dinner was 80. Pt had not eaten anything since yesterday. Writer encouraged Pt to eat his dinner. Pt eventually ate half of his dinner two hours later.     Pt endorsed SI and self harm. Contracted for safety. The bite kalyn on Pt's left arm is yellowish with slight drainage. Antibiotic given.     Medication compliant. Worked on his journal, self work book and sticker book in the lounge/dinning area.  BP (!) 130/91 (BP Location: Right arm, Patient Position: Sitting, Cuff Size: Adult Regular)   Pulse 90   Temp 97.8  F (36.6  C) (Temporal)   Resp 18   Wt 98.6 kg (217 lb 6.4 oz)   LMP  (LMP Unknown)   SpO2 96%   BMI 34.56 kg/m

## 2024-12-22 NOTE — PLAN OF CARE
Problem: Suicide Risk  Goal: Absence of Self-Harm  Outcome: Not Progressing  Intervention: Assess Risk to Self and Maintain Safety  Recent Flowsheet Documentation  Taken 12/22/2024 1651 by Gustavo Marte RN  Behavior Management:   behavioral plan reviewed   impulse control promoted   boundaries reinforced  Self-Harm Prevention:   environmental self-harm risks assessed   observed one-to-one  Intervention: Promote Psychosocial Wellbeing  Recent Flowsheet Documentation  Taken 12/22/2024 1651 by Gustavo Marte RN  Supportive Measures:   active listening utilized   verbalization of feelings encouraged  Sleep/Rest Enhancement: consistent schedule promoted  Family/Support System Care:   self-care encouraged   support provided   presence promoted   involvement promoted   Goal Outcome Evaluation:    Plan of Care Reviewed With: patient      Pt continues to be on SIO For SI and SIB    Visible in the milieu most of the shift listening to music, journaling, drawing, and coloring. Pt is frequently at the desk with nonstop requests. PRN  Tylenol, Flexeril, Ativan, Zyprexa X 2, Nicorette gum x 5, Biotin solution.  BG prior to dinner was 152  Endorsed SIB and contracted for safety. Dressing on self inflicted bite on left arm is clean, dry, and intact. No outburst this shift though slightly irritated from time to time when reiterated on things she can't have in her room.  Medication compliant. Ate 100% of his dinner. Attended group.  /86 (BP Location: Left arm)   Pulse 88   Temp 98.2  F (36.8  C) (Oral)   Resp 18   Wt 98.6 kg (217 lb 6.4 oz)   SpO2 96%   BMI 34.56 kg/m

## 2024-12-23 LAB
GLUCOSE BLDC GLUCOMTR-MCNC: 132 MG/DL (ref 70–99)
GLUCOSE BLDC GLUCOMTR-MCNC: 99 MG/DL (ref 70–99)

## 2024-12-23 PROCEDURE — 124N000002 HC R&B MH UMMC

## 2024-12-23 PROCEDURE — 97602 WOUND(S) CARE NON-SELECTIVE: CPT

## 2024-12-23 PROCEDURE — L3260 AMBULATORY SURGICAL BOOT EAC: HCPCS

## 2024-12-23 PROCEDURE — 250N000013 HC RX MED GY IP 250 OP 250 PS 637

## 2024-12-23 PROCEDURE — 250N000013 HC RX MED GY IP 250 OP 250 PS 637: Performed by: REGISTERED NURSE

## 2024-12-23 PROCEDURE — A9270 NON-COVERED ITEM OR SERVICE: HCPCS | Performed by: PSYCHIATRY & NEUROLOGY

## 2024-12-23 PROCEDURE — G0463 HOSPITAL OUTPT CLINIC VISIT: HCPCS | Mod: 25

## 2024-12-23 PROCEDURE — 250N000013 HC RX MED GY IP 250 OP 250 PS 637: Performed by: PSYCHIATRY & NEUROLOGY

## 2024-12-23 PROCEDURE — 250N000013 HC RX MED GY IP 250 OP 250 PS 637: Performed by: CLINICAL NURSE SPECIALIST

## 2024-12-23 PROCEDURE — 250N000013 HC RX MED GY IP 250 OP 250 PS 637: Performed by: NURSE PRACTITIONER

## 2024-12-23 PROCEDURE — 250N000011 HC RX IP 250 OP 636

## 2024-12-23 PROCEDURE — 99233 SBSQ HOSP IP/OBS HIGH 50: CPT | Performed by: NURSE PRACTITIONER

## 2024-12-23 RX ORDER — AMOXICILLIN 250 MG
1 CAPSULE ORAL 2 TIMES DAILY PRN
Status: DISPENSED | OUTPATIENT
Start: 2024-12-23

## 2024-12-23 RX ORDER — BISACODYL 10 MG
10 SUPPOSITORY, RECTAL RECTAL DAILY PRN
Status: ACTIVE | OUTPATIENT
Start: 2024-12-23

## 2024-12-23 RX ADMIN — SERTRALINE HYDROCHLORIDE 50 MG: 50 TABLET ORAL at 08:30

## 2024-12-23 RX ADMIN — NICOTINE POLACRILEX 4 MG: 4 GUM, CHEWING BUCCAL at 18:05

## 2024-12-23 RX ADMIN — Medication 1 SPRAY: at 05:28

## 2024-12-23 RX ADMIN — ARIPIPRAZOLE 15 MG: 15 TABLET ORAL at 08:29

## 2024-12-23 RX ADMIN — ONDANSETRON 4 MG: 4 TABLET, ORALLY DISINTEGRATING ORAL at 10:36

## 2024-12-23 RX ADMIN — DOCUSATE SODIUM 100 MG: 100 CAPSULE, LIQUID FILLED ORAL at 20:57

## 2024-12-23 RX ADMIN — OLANZAPINE 10 MG: 5 TABLET, ORALLY DISINTEGRATING ORAL at 19:42

## 2024-12-23 RX ADMIN — POLYETHYLENE GLYCOL 3350 17 G: 17 POWDER, FOR SOLUTION ORAL at 14:26

## 2024-12-23 RX ADMIN — ACETAMINOPHEN 650 MG: 325 TABLET, FILM COATED ORAL at 12:43

## 2024-12-23 RX ADMIN — CLONIDINE HYDROCHLORIDE 0.1 MG: 0.1 TABLET ORAL at 18:18

## 2024-12-23 RX ADMIN — INSULIN GLARGINE 5 UNITS: 100 INJECTION, SOLUTION SUBCUTANEOUS at 08:35

## 2024-12-23 RX ADMIN — NICOTINE POLACRILEX 4 MG: 4 GUM, CHEWING BUCCAL at 22:35

## 2024-12-23 RX ADMIN — POLYETHYLENE GLYCOL 3350 17 G: 17 POWDER, FOR SOLUTION ORAL at 08:29

## 2024-12-23 RX ADMIN — GABAPENTIN 600 MG: 600 TABLET, FILM COATED ORAL at 20:57

## 2024-12-23 RX ADMIN — HYDROXYZINE HYDROCHLORIDE 75 MG: 25 TABLET ORAL at 20:57

## 2024-12-23 RX ADMIN — VALPROIC ACID 250 MG: 250 SOLUTION ORAL at 14:53

## 2024-12-23 RX ADMIN — AMOXICILLIN AND CLAVULANATE POTASSIUM 1 TABLET: 875; 125 TABLET, FILM COATED ORAL at 08:29

## 2024-12-23 RX ADMIN — EMPAGLIFLOZIN 10 MG: 10 TABLET, FILM COATED ORAL at 08:29

## 2024-12-23 RX ADMIN — ROSUVASTATIN 40 MG: 20 TABLET, FILM COATED ORAL at 08:29

## 2024-12-23 RX ADMIN — NICOTINE POLACRILEX 4 MG: 4 GUM, CHEWING BUCCAL at 14:20

## 2024-12-23 RX ADMIN — NICOTINE POLACRILEX 4 MG: 4 GUM, CHEWING BUCCAL at 16:28

## 2024-12-23 RX ADMIN — NICOTINE POLACRILEX 4 MG: 4 GUM, CHEWING BUCCAL at 11:34

## 2024-12-23 RX ADMIN — AMOXICILLIN AND CLAVULANATE POTASSIUM 1 TABLET: 875; 125 TABLET, FILM COATED ORAL at 20:57

## 2024-12-23 RX ADMIN — LORAZEPAM 0.5 MG: 0.5 TABLET ORAL at 06:41

## 2024-12-23 RX ADMIN — NICOTINE POLACRILEX 4 MG: 4 GUM, CHEWING BUCCAL at 19:46

## 2024-12-23 RX ADMIN — NICOTINE POLACRILEX 4 MG: 4 GUM, CHEWING BUCCAL at 08:12

## 2024-12-23 RX ADMIN — LORAZEPAM 0.5 MG: 0.5 TABLET ORAL at 22:34

## 2024-12-23 RX ADMIN — ONDANSETRON 4 MG: 4 TABLET, ORALLY DISINTEGRATING ORAL at 19:58

## 2024-12-23 RX ADMIN — OMEPRAZOLE 20 MG: 20 CAPSULE, DELAYED RELEASE ORAL at 08:29

## 2024-12-23 RX ADMIN — NICOTINE 1 PATCH: 21 PATCH, EXTENDED RELEASE TRANSDERMAL at 08:30

## 2024-12-23 RX ADMIN — SENNOSIDES AND DOCUSATE SODIUM 1 TABLET: 50; 8.6 TABLET ORAL at 21:15

## 2024-12-23 RX ADMIN — TESTOSTERONE 100 MG OF TESTOSTERONE: 50 GEL TRANSDERMAL at 08:29

## 2024-12-23 RX ADMIN — BENZOYL PEROXIDE 1 APPLICATOR: 50 GEL TOPICAL at 08:36

## 2024-12-23 RX ADMIN — VALPROIC ACID 250 MG: 250 SOLUTION ORAL at 08:29

## 2024-12-23 RX ADMIN — AMLODIPINE BESYLATE 5 MG: 5 TABLET ORAL at 08:30

## 2024-12-23 RX ADMIN — LORAZEPAM 0.5 MG: 0.5 TABLET ORAL at 16:28

## 2024-12-23 RX ADMIN — VALACYCLOVIR 500 MG: 500 TABLET, FILM COATED ORAL at 08:29

## 2024-12-23 RX ADMIN — GABAPENTIN 1200 MG: 600 TABLET, FILM COATED ORAL at 14:23

## 2024-12-23 RX ADMIN — IBUPROFEN 400 MG: 200 TABLET, FILM COATED ORAL at 05:45

## 2024-12-23 RX ADMIN — GABAPENTIN 600 MG: 600 TABLET, FILM COATED ORAL at 08:29

## 2024-12-23 RX ADMIN — CLINDAMYCIN PHOSPHATE 1 APPLICATOR: 10 GEL TOPICAL at 08:36

## 2024-12-23 RX ADMIN — NICOTINE POLACRILEX 4 MG: 4 GUM, CHEWING BUCCAL at 10:09

## 2024-12-23 RX ADMIN — NICOTINE POLACRILEX 4 MG: 4 GUM, CHEWING BUCCAL at 06:26

## 2024-12-23 RX ADMIN — DOCUSATE SODIUM 100 MG: 100 CAPSULE, LIQUID FILLED ORAL at 08:29

## 2024-12-23 RX ADMIN — VALPROIC ACID 500 MG: 250 SOLUTION ORAL at 20:56

## 2024-12-23 RX ADMIN — DOXYCYCLINE HYCLATE 100 MG: 100 CAPSULE ORAL at 08:29

## 2024-12-23 ASSESSMENT — ACTIVITIES OF DAILY LIVING (ADL)
ADLS_ACUITY_SCORE: 48
HYGIENE/GROOMING: INDEPENDENT
ADLS_ACUITY_SCORE: 48
LAUNDRY: WITH SUPERVISION
ADLS_ACUITY_SCORE: 48
ORAL_HYGIENE: INDEPENDENT
ADLS_ACUITY_SCORE: 48

## 2024-12-23 NOTE — PLAN OF CARE
"Upcoming Meetings and Appointments:   Team note: Monday       Tasks Complete:  Chart review and met with team, discussed pt progress, symptomology, and response to treatment.  Discussed the discharge plan and any potential impediments to discharge    Revocation of provisional discharge received, copy given to pt, and placed in paperchart     Prakash requested to meet today to discuss his current care. He notes that he feels \" invalidated \" . He disagrees with his current dx and wonders how he can request the removal of BPD from his dx. We discussed the criteria for BPD and he does not agree. I applied DBT radical acceptance and distress tolerance in which he utilized very well.         Discharge Plan or Goal   Plan  Discharge home with outpatient supports     Care Team   Current Treatment Providers are:  Primary Physician: Becki Avery with [facility] in [place]      Psychiatrist/Medication Management:  Name/Organization: Reports he was seeing a provider at [facility] but wants to find a new provider with whom he feels a better connection with.         :  Name/Organization: [facility] Apartment: Reports he moved into his apartment in August of this year, location is [place].  Name/Organization: [employer], owner Fatou Martinez       [facility]:  Name/Organization: [facility]   Apt rescheduled for 27th @9AM with Concha corbin.      CADI Worker:  Name/Organization: Silvia Ham  [facility]  Phone:       Art Therapy: [facility]   Called and left message on 12/19/24 to reschedule.     Individual Therapy: [facility]  Apt for therapy rescheduled for - 11 AM on 12/24 with Pool Mo -         Barriers to Discharge   Ongoing stabilization      Referral Status  TBD     Legal Status  Court hold. PD revoked 12/23      Next Steps:  12/18- requested  revoke PD.    Make a referral to unit(s) of [facility] " clinic for Interventional Psychiatry ( [facility])   [phone]      Prakash would like a new psych provider- states that his  is referring. Follow up with CM to inquire who the provider will be.

## 2024-12-23 NOTE — PLAN OF CARE

## 2024-12-23 NOTE — PROGRESS NOTES
Brief Medicine Note:    IM peripherally following in the setting of self-inflicted bite wound and SIB by kicking doors. Per chart review, patient completed Cephalexin on 12/16. Started on Doxycyline and Augmentin 12/21. Patient are sutures in place which are supposed to come out sometime between 12/23 and 12/27  - Please remove sutures on 12/25  - Complete course of antibiotics  - Contact medicine if patient develops new or worsening foot pain, inability to walk or bare weight on the foot, new swelling, or other symptoms of concern  - Contact medicine if self-inflicted bite area becomes more red, swollen, painful, or if new drainage or other symptoms of concern are noted    Medicine will sign off. Please contact with future questions or concerns    Ashley Wynn PA-C  Internal Medicine [facility]  Please contact via Petsy

## 2024-12-23 NOTE — PLAN OF CARE
12/23/24 3888   Behavioral Team Discussion   Participants SUSANNAH Palacios [facility] and Luz Pimentel RN   Progress requires onging stabilzatoin   Anticipated length of stay 1-4 days   Continued Stay Criteria/Rationale ongoing stabilization   Medical/Physical See H&P   Precautions see below   Plan Discharge home with outpatient support   Anticipated Discharge Disposition other (see comments)     PRECAUTIONS AND SAFETY    Behavioral Orders   Procedures    Code 1 - Restrict to Unit    Routine Programming     As clinically indicated    Self Injury Precaution    Status 15     Every 15 minutes.    Status Individual Observation     Patient SIO status reviewed with team/RN.  Please also refer to RN/team documentation for add'l detail.  SIO Staff:  Please limit socialization with patient. Please avoid playing games with patient.    -SIO staff to monitor following which have contributed to patient being on SIO:  Self-harming behaviors  -Possible interventions SIO staff could use to support patient's treatment progress:  Redirection, support, offer a PRN med  -When following observed, team will review discontinuation of SIO:  No self harming behaviors     Order Specific Question:   CONTINUOUS 24 hours / day     Answer:   5 feet     Order Specific Question:   Indications for SIO     Answer:   Suicide risk     Order Specific Question:   Indications for SIO     Answer:   Self-injury risk    Suicide precautions: Suicide Risk: HIGH; Clinical rationale to override score: modification to the care environment     Search patient belongings per policy for contraband or items that could be used for self-harm.   Send personal items home or secure valuables according to Patient Belongings policy.  Secure visitor's belongings  Assess safety of clothing - Ask patient to change into gown/scrubs. Consider allowing to keep undergarments after search.  Direct visualization when patient in bathroom.     Order Specific  Question:   Suicide Risk     Answer:   HIGH     Order Specific Question:   Clinical rationale to override score:     Answer:   modification to the care environment       Safety  Safety WDL: WDL  Patient Location: dining room, hallway, lounge, patient room, own  Observed Behavior: calm  Observed Behavior (Comment): working on art work  Safety Measures: environmental rounds completed  Diversional Activity: art work, play, other (see comments) (Nitendo Switch)  Suicidality: Status 15, SIO (Status Individual Observation)  (NOTE - order will specify distance, Optimize communication / relationship to minimize opportunity for self-harm, Promote patient engagement with treatment process, Identify and strengthen protective factors  Seizure precautions: clutter free environment  Assault: status 15, status continuous sight

## 2024-12-23 NOTE — PLAN OF CARE
"Goal Outcome Evaluation:    Individual Therapy Note      Date of Service: December 22, 2024    Patient: Prakash goes by \"Prakash,\" uses he/him pronouns    Individuals Present: Prakash Jazzy Ruiz, [facility]    Session start: 8:00 pm  Session end: 8:40 pm  Session duration in minutes: 40      Modality Used: CBT, DBT, Person Centered, and Motivational Interviewing    Goals: Talked about safety on the unit and at home and what skills he can use when he has emotional dysregulation and issues with distress tolerance and the urge to hurt self. He feels this is the only thing that works. Writer was challenging this with him and teaching some skills. Also he is very into art and writer was talking to him from an Art Therapy perspective on how to use art as a positive replacement skills and expression and identification of feelings through his art. Writer also introduced him to bi lateral drawing and scribbling with pressure for regulation. He was somewhat open to new ideas but also  but set in his ways. When writer asked him what he will do at home, he said \" stab myself (showing writer stitches), I really want to kill myself\" Writer  told him if he never wants to come back to this place ( which he said) , he would have to find a way to be safe at home.    He says he has friends and family supports and a dog and many art supplies \" an art supply closet.\"     He did feel bad about telling practitioner to kill herself and said he would like to apologize.    He enjoyed the feedback that the weekend was better and he did a much better job at keeping safe.    He asked about getting privileges back and writer deferred him to him meeting with practitioner tomorrow. Writer said it would probably be slow, one thing at a time as he shows certain periods of safe behavior.     He said when he was having incidents he said he felt like he was being punished, but not having some insight that staff care and want him to be safe. He is feeling " "more understanding of that now.  Writer also challenged when he gets frustrated with others , why does he want to harm himself. Writer challenged him to think about alternate ways to cope with difficult social interactions, on the unit or out in the community.    Patient Description of current symptoms: still having urges but staying out of room and making art, coloring, journaling, reading.     Mental Status Exam:   Attitude: cooperative  Eye Contact: fair  Mood: angry, anxious, and depressed  Affect: intensity is exaggerated, intensity is heightened, and intensity is dramatic  Speech: clear, coherent  Psychomotor Behavior: no evidence of tardive dyskinesia, dystonia, or tics  Thought Process:  linear  Associations: no loose associations  Thought Content: active suicidal ideation present and thoughts of self-harm, which are remained the same  Insight: fair  Judgement: poor  Attention Span and Concentration: intact    Pt progress: Pt had a better weekend, used skills in milieu. Writer challenged them to try exercise, either pacing or ride the bike, they may feel better and they said \" look at me, do I look like I exercise. \"Also said they hoped for a lot of money. When writer asked how will you get a lot of money, they said , \"crime, bring the mob back.\"    Treatment Objective(s) Addressed:   The focus of this session was on building distress tolerance, anger management, building self-esteem, exploring obstacles to safety in the community, and building skills in emotional regulation, distress tolerance and replacement skills for SIB      Progress Towards Goals and Assessment of Patient:   Patient is making progress towards treatment goals as evidenced by willingness to stay safe over the weekend and engage in difficult conversations about changing their behaviors.       Therapeutic Intervention(s):   Provided active listening, unconditional positive regard, and validation.   Explored motivation for behavioral " change, Engaged in cognitive restructuring/ reframing, looked at common cognitive distortions and challenged negative thoughts, Provided positive reinforcement for progress towards goals, gains in knowledge, and application of skills previously taught, Engaged in social skills training, Identified stress relief practices, Explored strategies for self-soothing, Using CBT tools and instruction to improve insight, awareness, identifying unhealthy patterns and self-talk and replacing with healthier patterns and self-talk, Introduced and practiced Wise Mind Introduced and practiced urge surfing, Introduced and discussed radical acceptance, Introduced and explored accumulating positives, and Completed behavior chain analysis regarding events on Friday on the unit.      Plan/next step: learn what guidelines will be from practitioner, what are privileges for Art Therapy 1:1, for getting materials back to their room.    00593 - Psychotherapy (with patient) - 45 (38-52*) min    Patient Active Problem List   Diagnosis    ADHD (attention deficit hyperactivity disorder)    Marijuana abuse    Polysubstance abuse (H)    GERD (gastroesophageal reflux disease)    Tobacco abuse    Intractable back pain    Optic neuritis    Cauda equina syndrome with neurogenic bladder (H)    Schizoaffective disorder, bipolar type (H)    PTSD (post-traumatic stress disorder)    Anxiety    Auditory hallucination    Class 2 obesity due to excess calories without serious comorbidity with body mass index (BMI) of 36.0 to 36.9 in adult    Nephrolithiasis    Cannabis use disorder, severe, dependence (H)    Depression    History of heroin abuse (H)    Opioid use disorder, severe, dependence (H)    Substance-induced psychotic disorder with hallucinations (H)    Nausea    Urinary retention    Chronic bilateral low back pain without sciatica    AVA (generalized anxiety disorder)    Lumbosacral radiculopathy at L5    Abnormal uterine bleeding    Bipolar  affective disorder, mixed, severe, with psychotic behavior (H)    Akathisia    Hypertension, unspecified type    Female-to-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 person    Morbid obesity (H)    Mood disorder due to a general medical condition    Acne vulgaris    Type 2 diabetes mellitus with hyperglycemia, with long-term current use of insulin (H)    Herpes labialis    Major depressive disorder, 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

## 2024-12-23 NOTE — PLAN OF CARE
"Goal Outcome Evaluation:    Problem: Sleep Disturbance  Goal: Adequate Sleep/Rest  Outcome: Progressing        Patient was sleeping with mattress on the floor at the start of the overnight shift. Was snoring loudly. Appeared comfortable. Woke up around 0525 hours and complained of \"sore throat\" and \"dry mouth\". PRN Biotene given. Ibuprofen given for headache 4/10. Sitting in the dining area at this time. Slept approximately 6.25 hours. Continues to be on SIO 1:1 due to suicide and self-injury risks. Patient was heard telling SIO \"stop getting close to me!\". PRN Nicotine gum also given. Ativan given for anxiety and feeling restless.  Will continue to monitor.               "

## 2024-12-23 NOTE — PLAN OF CARE
"Goal Outcome Evaluation:    Problem: Adult Behavioral Health Plan of Care  Goal: Patient-Specific Goal (Individualization)  Description: You can add care plan individualizations to a care plan. Examples of Individualization might be:  \"Parent requests to be called daily at 9am for status\", \"I have a hard time hearing out of my right ear\", or \"Do not touch me to wake me up as it startles  me\".  Outcome: Progressing     Problem: Anxiety Signs/Symptoms  Goal: Improved Sleep (Anxiety Signs/Symptoms)  Outcome: Progressing     Pt remains on SIO for safety related to urges to self-harm. He continues to have access to books, art supplies, etc., when he is out of his room.     Pt is compliant with meds. Writer has 1:1 conversation with pt during med administration regarding his current care plan and staff rationale for not allowing him access to belongings in his room. Writer encourages him to look toward discharge and mentions outpt supports that he has, including an emotional support dog that a friend has been caring for in his absence. Pt reports that he feels frustrated with this conversation, so writer leaves per his request. Pt later speaks with writer and states that he appreciates staff but feels offended that staff do not seem to think that he's trying hard enough. Writer reassured him that we know he is making efforts to get better and writer praises him for attending group. He is encouraged to keep going to groups and work toward feeling safe enough to be removed from SIO.     Writer contacted provider for additional bowel meds. Sutures removed by WOC. Evening shift will dress bite-wound.    Luz Diaz RN                       "

## 2024-12-23 NOTE — CONSULTS
[facility] Nurse Inpatient Assessment     Consulted for: Left hand    Summary: Self inflicted bite.    Patient History (according to provider note(s):      Per Sydney Subramanian on 12/21/2024: Internal medicine contacted by primary team regarding right foot pain and self inflicted bite wound.  Pt has repeatedly engaged in self injurious behavior by kicking doors.  Patient unable to contract for safety and with escalating behaviors as well.  [facility] team contacted today and present in pt's room on arrival.  On exam, dorsal aspect of R foot with trace swelling and small bruising, no erythema.  Slight tenderness to touch.  Able to bear weight but painful.  Left arm with half dollar-sized round bite wound on inside of left wrist with serous drainage, bruising, and erythema.  Pt reports that she did break the skin and it was bleeding the day before.  No fevers or chills.      Assessment:      Areas visualized during today's visit: Hands    Wound location: Left hand     12/23 Left hand                                        12/23 Left forearm    Last photo: 12/23/2024  Wound due to: Trauma  Wound history/plan of care: self inflicted  Wound base:  100% Serous scabbing     Palpation of the wound bed: normal      Drainage: none     Description of drainage: none     Measurements (length x width x depth, in cm): see photos     Tunneling: N/A     Undermining: N/A  Periwound skin: Intact      Color: pink      Temperature: normal   Odor: none  Pain: denies   Pain interventions prior to dressing change: slow and gentle cares   Treatment goal: Heal   STATUS: initial assessment  Supplies ordered: gathered    Patient care order for suture removal of wounds on left forearm. Sutures removed on [facility] visit today.        Treatment Plan:     Left hand and arm wound(s): Daily : Wash wounds with wound cleanser and gauze. Apply a thin layer of Aquaphor to open areas. Cover as needed with Mepilex or  "gauze and Tegaderm (Tegarderm may be a better to keep patient from biting again, OK to use either dressing). Patient does not need to cover wounds when showering.      Orders: Written    RECOMMEND PRIMARY TEAM ORDER: None, at this time  Education provided: plan of care  Discussed plan of care with: Patient and Nurse  [facility] nurse follow-up plan: weekly  Notify WOC if wound(s) deteriorate.  Nursing to notify the Provider(s) and re-consult the WO Nurse if new skin concern.    DATA:     Current support surface: Standard  Foam mattress (Behavioral unit)  Containment of urine/stool: Continent of bladder and Continent of bowel  BMI: Body mass index is 34.56 kg/m .   Active diet order: Orders Placed This Encounter      Regular Diet Adult     Output: No intake/output data recorded.     Labs: No lab results found in last 7 days.    Invalid input(s): \"GLUCOMBO\"  Pressure injury risk assessment:   Sensory Perception: 4-->no impairment  Moisture: 4-->rarely moist  Activity: 3-->walks occasionally  Mobility: 4-->no limitation  Nutrition: 3-->adequate  Friction and Shear: 3-->no apparent problem  Moiz Score: 21    Narcisa Headley RN CWOCN  Pager no longer is use, please contact through [employer] group: [facility]  Dept. Office Number: [phone]    "

## 2024-12-23 NOTE — PROGRESS NOTES
S: order received to see patient at [facility] for an eval for an Post op shoe as ordered.  O/G: protection of the foot  A: patient seen for an evaluation.    Patient was fit with a size medium post op shoe.  P: patient to contact us if any issues arise  KELLY Conley
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
## 2024-12-24 LAB
GLUCOSE BLDC GLUCOMTR-MCNC: 144 MG/DL (ref 70–99)
GLUCOSE BLDC GLUCOMTR-MCNC: 153 MG/DL (ref 70–99)
GLUCOSE BLDC GLUCOMTR-MCNC: 155 MG/DL (ref 70–99)
VALPROATE SERPL-MCNC: 46 UG/ML

## 2024-12-24 PROCEDURE — 250N000013 HC RX MED GY IP 250 OP 250 PS 637: Performed by: REGISTERED NURSE

## 2024-12-24 PROCEDURE — 99233 SBSQ HOSP IP/OBS HIGH 50: CPT | Performed by: NURSE PRACTITIONER

## 2024-12-24 PROCEDURE — 36415 COLL VENOUS BLD VENIPUNCTURE: CPT | Performed by: REGISTERED NURSE

## 2024-12-24 PROCEDURE — 124N000002 HC R&B MH UMMC

## 2024-12-24 PROCEDURE — 90837 PSYTX W PT 60 MINUTES: CPT | Performed by: COUNSELOR

## 2024-12-24 PROCEDURE — 250N000013 HC RX MED GY IP 250 OP 250 PS 637: Performed by: PSYCHIATRY & NEUROLOGY

## 2024-12-24 PROCEDURE — 80164 ASSAY DIPROPYLACETIC ACD TOT: CPT | Performed by: REGISTERED NURSE

## 2024-12-24 PROCEDURE — 250N000013 HC RX MED GY IP 250 OP 250 PS 637: Performed by: CLINICAL NURSE SPECIALIST

## 2024-12-24 PROCEDURE — 250N000013 HC RX MED GY IP 250 OP 250 PS 637

## 2024-12-24 PROCEDURE — 97150 GROUP THERAPEUTIC PROCEDURES: CPT | Mod: GO

## 2024-12-24 PROCEDURE — 250N000013 HC RX MED GY IP 250 OP 250 PS 637: Performed by: NURSE PRACTITIONER

## 2024-12-24 PROCEDURE — A9270 NON-COVERED ITEM OR SERVICE: HCPCS | Performed by: PSYCHIATRY & NEUROLOGY

## 2024-12-24 RX ORDER — GABAPENTIN 600 MG/1
600 TABLET ORAL 3 TIMES DAILY
Status: DISCONTINUED | OUTPATIENT
Start: 2024-12-25 | End: 2024-12-24

## 2024-12-24 RX ORDER — GABAPENTIN 600 MG/1
600 TABLET ORAL 3 TIMES DAILY
Status: DISCONTINUED | OUTPATIENT
Start: 2024-12-24 | End: 2024-12-27

## 2024-12-24 RX ORDER — DIPHENHYDRAMINE HYDROCHLORIDE, ZINC ACETATE 2; .1 G/100G; G/100G
CREAM TOPICAL 3 TIMES DAILY PRN
Status: DISPENSED | OUTPATIENT
Start: 2024-12-24

## 2024-12-24 RX ADMIN — HYDROXYZINE HYDROCHLORIDE 50 MG: 25 TABLET ORAL at 17:08

## 2024-12-24 RX ADMIN — DIPHENHYDRAMINE HYDROCHLORIDE, ZINC ACETATE: 2; .1 CREAM TOPICAL at 22:52

## 2024-12-24 RX ADMIN — GABAPENTIN 600 MG: 600 TABLET, FILM COATED ORAL at 08:33

## 2024-12-24 RX ADMIN — AMOXICILLIN AND CLAVULANATE POTASSIUM 1 TABLET: 875; 125 TABLET, FILM COATED ORAL at 08:33

## 2024-12-24 RX ADMIN — LORAZEPAM 0.5 MG: 0.5 TABLET ORAL at 23:52

## 2024-12-24 RX ADMIN — CLINDAMYCIN PHOSPHATE: 10 GEL TOPICAL at 08:38

## 2024-12-24 RX ADMIN — TESTOSTERONE 100 MG OF TESTOSTERONE: 50 GEL TRANSDERMAL at 13:10

## 2024-12-24 RX ADMIN — ROSUVASTATIN 40 MG: 20 TABLET, FILM COATED ORAL at 08:34

## 2024-12-24 RX ADMIN — NICOTINE POLACRILEX 4 MG: 4 GUM, CHEWING BUCCAL at 11:49

## 2024-12-24 RX ADMIN — OMEPRAZOLE 20 MG: 20 CAPSULE, DELAYED RELEASE ORAL at 08:34

## 2024-12-24 RX ADMIN — POLYETHYLENE GLYCOL 3350 17 G: 17 POWDER, FOR SOLUTION ORAL at 08:32

## 2024-12-24 RX ADMIN — VALPROIC ACID 250 MG: 250 SOLUTION ORAL at 08:34

## 2024-12-24 RX ADMIN — GABAPENTIN 1200 MG: 600 TABLET, FILM COATED ORAL at 13:49

## 2024-12-24 RX ADMIN — EMPAGLIFLOZIN 10 MG: 10 TABLET, FILM COATED ORAL at 08:33

## 2024-12-24 RX ADMIN — SERTRALINE HYDROCHLORIDE 50 MG: 50 TABLET ORAL at 08:34

## 2024-12-24 RX ADMIN — VALACYCLOVIR 500 MG: 500 TABLET, FILM COATED ORAL at 08:34

## 2024-12-24 RX ADMIN — NICOTINE POLACRILEX 4 MG: 4 GUM, CHEWING BUCCAL at 08:49

## 2024-12-24 RX ADMIN — IBUPROFEN 400 MG: 200 TABLET, FILM COATED ORAL at 13:52

## 2024-12-24 RX ADMIN — NICOTINE POLACRILEX 4 MG: 4 GUM, CHEWING BUCCAL at 07:32

## 2024-12-24 RX ADMIN — HYDROXYZINE HYDROCHLORIDE 75 MG: 25 TABLET ORAL at 20:31

## 2024-12-24 RX ADMIN — VALPROIC ACID 500 MG: 250 SOLUTION ORAL at 14:58

## 2024-12-24 RX ADMIN — AMOXICILLIN AND CLAVULANATE POTASSIUM 1 TABLET: 875; 125 TABLET, FILM COATED ORAL at 20:31

## 2024-12-24 RX ADMIN — NICOTINE POLACRILEX 4 MG: 4 GUM, CHEWING BUCCAL at 21:39

## 2024-12-24 RX ADMIN — LORAZEPAM 0.5 MG: 0.5 TABLET ORAL at 11:49

## 2024-12-24 RX ADMIN — NICOTINE POLACRILEX 4 MG: 4 GUM, CHEWING BUCCAL at 14:58

## 2024-12-24 RX ADMIN — OLANZAPINE 10 MG: 5 TABLET, ORALLY DISINTEGRATING ORAL at 19:20

## 2024-12-24 RX ADMIN — NICOTINE 1 PATCH: 21 PATCH, EXTENDED RELEASE TRANSDERMAL at 07:32

## 2024-12-24 RX ADMIN — BENZOYL PEROXIDE: 50 GEL TOPICAL at 08:38

## 2024-12-24 RX ADMIN — VALPROIC ACID 500 MG: 250 SOLUTION ORAL at 21:39

## 2024-12-24 RX ADMIN — INSULIN GLARGINE 5 UNITS: 100 INJECTION, SOLUTION SUBCUTANEOUS at 07:38

## 2024-12-24 RX ADMIN — NICOTINE POLACRILEX 4 MG: 4 GUM, CHEWING BUCCAL at 16:45

## 2024-12-24 RX ADMIN — ARIPIPRAZOLE 15 MG: 15 TABLET ORAL at 08:33

## 2024-12-24 RX ADMIN — DOCUSATE SODIUM 100 MG: 100 CAPSULE, LIQUID FILLED ORAL at 08:34

## 2024-12-24 RX ADMIN — NICOTINE POLACRILEX 4 MG: 4 GUM, CHEWING BUCCAL at 19:42

## 2024-12-24 RX ADMIN — AMLODIPINE BESYLATE 5 MG: 5 TABLET ORAL at 08:34

## 2024-12-24 RX ADMIN — GABAPENTIN 600 MG: 600 TABLET, FILM COATED ORAL at 22:40

## 2024-12-24 RX ADMIN — LORAZEPAM 0.5 MG: 0.5 TABLET ORAL at 18:12

## 2024-12-24 RX ADMIN — DOCUSATE SODIUM 100 MG: 100 CAPSULE, LIQUID FILLED ORAL at 20:31

## 2024-12-24 ASSESSMENT — ACTIVITIES OF DAILY LIVING (ADL)
ADLS_ACUITY_SCORE: 48

## 2024-12-24 NOTE — PROGRESS NOTES
Rehab Group    Start time: 915      1115  End time: 1100      1200  Patient time total: 115 minutes    attended partial group    #2 attended   Group Type: occupational therapy and OT Clinic   Group Topic Covered: activity therapy, coping skills, and problem solving       Group Session Detail:  OT Clinic     Patient Response/Contribution:  cooperative with task, socially appropriate, safe use of materials/supplies, attentive, and actively engaged       Patient Detail:    Pt actively participated in occupational therapy clinic to facilitate coping skill exploration, creative expression within personally meaningful activities, and clinical observation of social, cognitive, and kinesthetic performance skills. Pt response: Needed min assist to initiate, gather materials, sequence, and adjust to workspace demands as needed. Demonstrated fair focus, planning, and problem solving for selected wood project task. Able to ask for assistance as needed, and social with peers and staff. Pt would become frustrated when things were not working out how he would like, but with verbal redirection  was able to refocus back to task.  Discussed with pt his ability to focus on what he can control rather than spending time angry and frustrated over things he cannot.  Pt appeared open to this concept during group time, and asked therapist to review the strategies later during group. Pt will continue to be encouraged to attend groups for further asssesssment and to address goals identified on plan of care.       [id number] OT Group (2 or more in attendance)      Patient Active Problem List   Diagnosis    ADHD (attention deficit hyperactivity disorder)    Marijuana abuse    Polysubstance abuse (H)    GERD (gastroesophageal reflux disease)    Tobacco abuse    Intractable back pain    Optic neuritis    Cauda equina syndrome with neurogenic bladder (H)    Schizoaffective disorder, bipolar type (H)    PTSD (post-traumatic stress disorder)    Anxiety     Auditory hallucination    Class 2 obesity due to excess calories without serious comorbidity with body mass index (BMI) of 36.0 to 36.9 in adult    Nephrolithiasis    Cannabis use disorder, severe, dependence (H)    Depression    History of heroin abuse (H)    Opioid use disorder, severe, dependence (H)    Substance-induced psychotic disorder with hallucinations (H)    Nausea    Urinary retention    Chronic bilateral low back pain without sciatica    AVA (generalized anxiety disorder)    Lumbosacral radiculopathy at L5    Abnormal uterine bleeding    Bipolar affective disorder, mixed, severe, with psychotic behavior (H)    Akathisia    Hypertension, unspecified type    Female-to-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 person    Morbid obesity (H)    Mood disorder due to a general medical condition    Acne vulgaris    Type 2 diabetes mellitus with hyperglycemia, with long-term current use of insulin (H)    Herpes labialis    Major depressive disorder, 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

## 2024-12-24 NOTE — PLAN OF CARE
"Problem: Adult Behavioral Health Plan of Care  Goal: Optimized Coping Skills in Response to Life Stressors  Outcome: Progressing     Problem: Suicide Risk  Goal: Absence of Self-Harm  Outcome: Progressing     Goal Outcome Evaluation:    During check in, pt was able to reflect on having a good weekend; admitted that today had been more difficult. Pt denies any self harm since Thursday.  Pt stated his SI urges were 6/10 with anxiety at a 10/10 and depression at an 8/10. Encouraged pt to evaluate mood/triggers throughout evening. Pt appeared comfortable in early evening. Pt's BP was 141/96. Pt agreed to take clonidine and BP was normal when rechecked later in evening. Using PRNs frequently, denies any SIB but reports urges. Provided ice packs x 1 to forearms to diffuse urges. Completed wound care on right forearm and back of hand.      At 1930, requested olanzapine 10 mg. Pt said he had urges to self harm and stated, \"I don't wannna screw up.\" Reported later this was effective for his urges. Asked for zofran for nausea x 1. When taking bedtime medications at 2030, pt stated, \"I'm hallucinating like a mother fucker.\" Pt requested lorazepam with bedtime medications. Pt was encouraged to take later and took this at 2240. In unge, pt was coloring and reading intermittently. Provided books in bag [facility]1 to [facility].     /85   Pulse 80   Temp 98.2  F (36.8  C) (Oral)   Resp 18   Wt 98.6 kg (217 lb 6.4 oz)   SpO2 96%   BMI 34.56 kg/m      "

## 2024-12-24 NOTE — PLAN OF CARE
Upcoming Meetings and Appointments:   Team note: Monday     Cm meeting Thursday the 26th @ 12:30p    Tasks Complete:  Chart review and met with team, discussed pt progress, symptomology, and response to treatment.  Discussed the discharge plan and any potential impediments to discharge    Rescheduled therapy intake at [facility] . See AVS. Will need to reschcedule if pt remains hospitalized     Left  for art therapy provider breanna (  Direct Phone: [phone]) to confirm art therapy appointment time. Scheduled for this Saturday 12/28. This will need to be reschedule if he does not discharge prior to Saturday     Cm notes that she will plan to visit on Thursday the 26th @ 12:30pm. Cm is advocating for a longer hospitalization due to lack of sleep and chronic SI. Education provided to CM regarding BPD symptoms. Cm is encouraged to discuss coping skills and community resources.       Discharge Plan or Goal   Plan  Discharge home with outpatient supports     Care Team   Current Treatment Providers are:  Primary Physician: Becki Aveyr with [facility] in [place]      Psychiatrist/Medication Management:  Name/Organization: Reports he was seeing a provider at [facility] but wants to find a new provider with whom he feels a better connection with.         :  Name/Organization: Jie David  [facility] Apartment: Reports he moved into his apartment in August of this year, location is [place].  Name/Organization: [facility], owner Fatou Martinez       [facility]:  Name/Organization: [facility]   Apt rescheduled for 27th @9AM with Concha corbin.      [facility] Worker:  Name/Organization: Silvia Ham  [facility]  Phone:       Art Therapy: [facility]   Called and left message on 12/28/24  time TBD. Pt will need to call provider directly to confirm.     Individual Therapy: [facility]  Apt for therapy rescheduled for -  01/02/25 @11AM with Pool Mo          Barriers to Discharge   Ongoing stabilization      Referral Status  TBD     Legal Status  Court hold. PD revoked 12/23      Next Steps:     Coverage: prakash does not have a known discharge date at this time. Please reschedule Art therapy appointment if he does not discharge before 12/28. I have noted on the AVS for Prakash to reach out to his provider directly if you are unable to reschedule. In addition, he has a [facility] intake appointment on 12/27 via [facility] that will need to be rescheduled if he does not discharge prior to 12/27. Please not changes on AVS    Coverage: please make a referral to unit(s) [facility] only if he is to discharge prior to my return or discharge is planned for Monday  [phone] -     Prakash would like a new psych provider- states that his  is referring. Follow up with CM to inquire who the provider will be.. I emailed the  on 12/24 and cc'd Hope luiza to this email. Waiting for a response. Perhaps he can see a psych provider via [facility]
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
## 2024-12-24 NOTE — PLAN OF CARE
"Upcoming Meetings and Appointments:   Team note: Monday     Case management meeting : 12/26 @12:30PM      Tasks Complete:  Chart review and met with team, discussed pt progress, symptomology, and response to treatment.  Discussed the discharge plan and any potential impediments to discharge    Revocation of provisional discharge received, copy given to pt, and placed in paperchart     Prakash requested to meet today to discuss his current care. He notes that he feels \" invalidated \" . He disagrees with his current dx and wonders how he can request the removal of BPD from his dx. We discussed the criteria for BPD and he does not agree. I applied DBT radical acceptance and distress tolerance in which he utilized very well.         Discharge Plan or Goal   Plan  Discharge home with outpatient supports     Care Team   Current Treatment Providers are:  Primary Physician: Becki Avery with [facility] in [place]      Psychiatrist/Medication Management:  Name/Organization: Reports he was seeing a provider at [facility] but wants to find a new provider with whom he feels a better connection with.         :  Name/Organization: Jie David  [facility] Apartment: Reports he moved into his apartment in August of this year, location is [place].  Name/Organization: [facility], owner Fatou Martinez       [facility]:  Name/Organization: [facility]   Apt rescheduled for 27th @9AM with Concha corbin.      CADI Worker:  Name/Organization: [facility]  Phone:       Art Therapy: [facility]   Called and left message on 12/19/24 to reschedule.     Individual Therapy: [facility]  Apt for therapy rescheduled for - 11 AM on 12/24 with Pool Mo -         Barriers to Discharge   Ongoing stabilization      Referral Status  TBD     Legal Status  Court hold. PD revoked 12/23      Next Steps:  12/18- requested  revoke " PD.    Make a referral to unit(s) of [facility] ( [facility])   [phone]      Randall would like a new psych provider- states that his  is referring. Follow up with CM to inquire who the provider will be.

## 2024-12-24 NOTE — PLAN OF CARE
Problem: Suicidal Behavior  Goal: Suicidal Behavior is Absent or Managed  Outcome: Progressing   Goal Outcome Evaluation:    Plan of Care Reviewed With: patient        Patient alert and oriented x 4. He was pleasant on approach. Blunted affect noted. He reported low level of anxiety; depression 6/10, SI, and auditory hallucinations - voices telling him to hurt himself. Patient on 1:1, he also contracted for safety. He denies HI/VH. He's eating and drinking adequately. He's visible in the lounge - was seen coloring/drawing. He attended groups. Patient frequently seeks out for staff.     PRN ativan given at 1150 hrs for anxiety 9/10- effective. PRN nicotine gum given.     Dressing  changed on right radha and rt hand.     Patient valproic acid dose increased. Blood draw fro valproic acid level on 12/29/24.

## 2024-12-24 NOTE — PLAN OF CARE

## 2024-12-24 NOTE — PLAN OF CARE
Problem: Sleep Disturbance  Goal: Adequate Sleep/Rest  Outcome: Progressing     Problem: Anxiety Signs/Symptoms  Goal: Optimized Energy Level (Anxiety Signs/Symptoms)  Intervention: Optimize Energy Level  Recent Flowsheet Documentation  Taken 12/24/2024 0000 by Lara Canseco RN  Diversional Activity:   art work   music   Goal Outcome Evaluation:    Patient sitting in lounge area at the start of the shift coloring. On approach, affect is flat and blunted, denies pain, c/o having double vision in both eyes, but denies any other associated symptoms like migraine, dizziness, headache, loss of balance, numbness in all extremities. Denies any confusion, speaking softly in full sentences and also denies pain in both eyes, denies anxiety/depression/SI/HI/SIB/AVH at this times. VS taken and patient is negative for postural hypotension.Denies all intervention at this time.Blood sugar 155 . Encouraged and tolerating po fluid intake, then eventually walked to room/bed with steady gait. Slept all night for 6 hours without any further complaints. Remains on SIO for severe SIB and high suicide risk.Continue to monitor per plan of care.

## 2024-12-24 NOTE — PLAN OF CARE
"Goal Outcome Evaluation:    Individual Therapy Note      Date of Service: December 24, 2024    Patient: Prakash goes by \"Prakash,\" uses he/him pronouns    Individuals Present: Prakash NATHAN Gonzalez    Session start: 10:15 am  Session end: 11:15 am  Session duration in minutes: 60      Modality Used: DBT, Person Centered, and Solution Focused    Goals: Discuss Behavior support plan, what is baseline, how to work toward discharge, discuss what is underlying self loathing    Patient Description of current symptoms: better, still urges but wants to get better. Frustrated he thinks he is being pushed out of hospital. [facility]  believes he should be hospitalized he reports.     Mental Status Exam:   Attitude: cooperative  Eye Contact: fair  Mood: angry and depressed  Affect: appropriate and in normal range and intensity is dramatic  Speech: clear, coherent  Psychomotor Behavior: no evidence of tardive dyskinesia, dystonia, or tics  Thought Process:  logical, linear, goal oriented, and illogical  Associations: no loose associations  Thought Content: active suicidal ideation present and thoughts of self-harm, which are remained the same  Insight: limited  Judgement: poor  Attention Span and Concentration: intact    Pt progress: Pt seemed invested in helping with behavioral support plan. After  from [facility] called, they were ruminating on the fact that hospital wants to discharge him and he and  in [facility] don't think he is ready. He was ruminating later in the day, after this 1:1 and asked writer about if it was true he is \" being pushed out\". Writer did talk to him about \"overthinking\". Reviewed all we discussed this morning and reiterated the plan is to have safe behaviors to get off the 1:1, earn his belongings back and discharge safely to home. He says  from [facility] is visiting at the end of the week and she would like to talk to [facility] here on unit.    Treatment " "Objective(s) Addressed:   The focus of this session was on identifying an appropriate aftercare plan, building distress tolerance, anger management, assessing safety, identifying treatment goals, and building self-esteem     Progress Towards Goals and Assessment of Patient:   Patient is making progress towards treatment goals as evidenced by willingness to engage in therapy and be honest about feelings, traumas .       Therapeutic Intervention(s):   Provided active listening, unconditional positive regard, and validation.   Explored motivation for safety on unit and regaining privileges moving toward independence and a safe discharge, Explored motivation for behavioral change, Provided positive reinforcement for progress towards goals, gains in knowledge, and application of skills previously taught, Identified stress relief practices, Explored strategies for self-soothing, Engaged in guided discovery, explored patient's perspectives and helped expand them through socratic dialogue, and Introduced and practiced Wise Mind Introduced and practiced urge surfing      Plan/next step: meet again this week, art therapy to process/ contain some of the \" stuffed\" feelings about past traumas.    68776 - Psychotherapy (with patient) - 60 (53+*) min    Patient Active Problem List   Diagnosis    ADHD (attention deficit hyperactivity disorder)    Marijuana abuse    Polysubstance abuse (H)    GERD (gastroesophageal reflux disease)    Tobacco abuse    Intractable back pain    Optic neuritis    Cauda equina syndrome with neurogenic bladder (H)    Schizoaffective disorder, bipolar type (H)    PTSD (post-traumatic stress disorder)    Anxiety    Auditory hallucination    Class 2 obesity due to excess calories without serious comorbidity with body mass index (BMI) of 36.0 to 36.9 in adult    Nephrolithiasis    Cannabis use disorder, severe, dependence (H)    Depression    History of heroin abuse (H)    Opioid use disorder, severe, " dependence (H)    Substance-induced psychotic disorder with hallucinations (H)    Nausea    Urinary retention    Chronic bilateral low back pain without sciatica    AVA (generalized anxiety disorder)    Lumbosacral radiculopathy at L5    Abnormal uterine bleeding    Bipolar affective disorder, mixed, severe, with psychotic behavior (H)    Akathisia    Hypertension, unspecified type    Female-to-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 person    Morbid obesity (H)    Mood disorder due to a general medical condition    Acne vulgaris    Type 2 diabetes mellitus with hyperglycemia, with long-term current use of insulin (H)    Herpes labialis    Major depressive disorder, 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

## 2024-12-25 LAB
GLUCOSE BLDC GLUCOMTR-MCNC: 115 MG/DL (ref 70–99)
GLUCOSE BLDC GLUCOMTR-MCNC: 167 MG/DL (ref 70–99)
GLUCOSE BLDC GLUCOMTR-MCNC: 198 MG/DL (ref 70–99)

## 2024-12-25 PROCEDURE — 90853 GROUP PSYCHOTHERAPY: CPT | Performed by: COUNSELOR

## 2024-12-25 PROCEDURE — 250N000013 HC RX MED GY IP 250 OP 250 PS 637: Performed by: CLINICAL NURSE SPECIALIST

## 2024-12-25 PROCEDURE — 124N000002 HC R&B MH UMMC

## 2024-12-25 PROCEDURE — 250N000013 HC RX MED GY IP 250 OP 250 PS 637: Performed by: NURSE PRACTITIONER

## 2024-12-25 PROCEDURE — 250N000013 HC RX MED GY IP 250 OP 250 PS 637: Performed by: PSYCHIATRY & NEUROLOGY

## 2024-12-25 PROCEDURE — 250N000011 HC RX IP 250 OP 636

## 2024-12-25 PROCEDURE — A9270 NON-COVERED ITEM OR SERVICE: HCPCS | Performed by: PSYCHIATRY & NEUROLOGY

## 2024-12-25 PROCEDURE — 250N000013 HC RX MED GY IP 250 OP 250 PS 637

## 2024-12-25 PROCEDURE — 250N000013 HC RX MED GY IP 250 OP 250 PS 637: Performed by: REGISTERED NURSE

## 2024-12-25 PROCEDURE — 90832 PSYTX W PT 30 MINUTES: CPT | Performed by: COUNSELOR

## 2024-12-25 RX ADMIN — NICOTINE POLACRILEX 4 MG: 4 GUM, CHEWING BUCCAL at 07:05

## 2024-12-25 RX ADMIN — AMOXICILLIN AND CLAVULANATE POTASSIUM 1 TABLET: 875; 125 TABLET, FILM COATED ORAL at 07:50

## 2024-12-25 RX ADMIN — NICOTINE POLACRILEX 4 MG: 4 GUM, CHEWING BUCCAL at 08:04

## 2024-12-25 RX ADMIN — VALPROIC ACID 500 MG: 250 SOLUTION ORAL at 07:52

## 2024-12-25 RX ADMIN — NICOTINE POLACRILEX 4 MG: 4 GUM, CHEWING BUCCAL at 09:05

## 2024-12-25 RX ADMIN — NICOTINE 1 PATCH: 21 PATCH, EXTENDED RELEASE TRANSDERMAL at 07:51

## 2024-12-25 RX ADMIN — AMLODIPINE BESYLATE 5 MG: 5 TABLET ORAL at 07:52

## 2024-12-25 RX ADMIN — POLYETHYLENE GLYCOL 3350 17 G: 17 POWDER, FOR SOLUTION ORAL at 07:52

## 2024-12-25 RX ADMIN — VALPROIC ACID 500 MG: 250 SOLUTION ORAL at 21:16

## 2024-12-25 RX ADMIN — HYDROXYZINE HYDROCHLORIDE 75 MG: 25 TABLET ORAL at 20:12

## 2024-12-25 RX ADMIN — LORAZEPAM 0.5 MG: 0.5 TABLET ORAL at 07:51

## 2024-12-25 RX ADMIN — NICOTINE POLACRILEX 4 MG: 4 GUM, CHEWING BUCCAL at 12:48

## 2024-12-25 RX ADMIN — LORAZEPAM 0.5 MG: 0.5 TABLET ORAL at 17:03

## 2024-12-25 RX ADMIN — LORAZEPAM 0.5 MG: 0.5 TABLET ORAL at 23:49

## 2024-12-25 RX ADMIN — IBUPROFEN 400 MG: 200 TABLET, FILM COATED ORAL at 23:49

## 2024-12-25 RX ADMIN — VALPROIC ACID 500 MG: 250 SOLUTION ORAL at 15:30

## 2024-12-25 RX ADMIN — ONDANSETRON 4 MG: 4 TABLET, ORALLY DISINTEGRATING ORAL at 09:22

## 2024-12-25 RX ADMIN — NICOTINE POLACRILEX 4 MG: 4 GUM, CHEWING BUCCAL at 21:58

## 2024-12-25 RX ADMIN — NICOTINE POLACRILEX 4 MG: 4 GUM, CHEWING BUCCAL at 20:29

## 2024-12-25 RX ADMIN — OLANZAPINE 5 MG: 5 TABLET, ORALLY DISINTEGRATING ORAL at 10:00

## 2024-12-25 RX ADMIN — INSULIN GLARGINE 5 UNITS: 100 INJECTION, SOLUTION SUBCUTANEOUS at 07:54

## 2024-12-25 RX ADMIN — BENZOYL PEROXIDE: 50 GEL TOPICAL at 07:57

## 2024-12-25 RX ADMIN — CLINDAMYCIN PHOSPHATE: 10 GEL TOPICAL at 07:57

## 2024-12-25 RX ADMIN — NICOTINE POLACRILEX 4 MG: 4 GUM, CHEWING BUCCAL at 17:53

## 2024-12-25 RX ADMIN — GABAPENTIN 600 MG: 600 TABLET, FILM COATED ORAL at 20:11

## 2024-12-25 RX ADMIN — ARIPIPRAZOLE 15 MG: 15 TABLET ORAL at 07:51

## 2024-12-25 RX ADMIN — NICOTINE POLACRILEX 4 MG: 4 GUM, CHEWING BUCCAL at 11:07

## 2024-12-25 RX ADMIN — ROSUVASTATIN 40 MG: 20 TABLET, FILM COATED ORAL at 07:50

## 2024-12-25 RX ADMIN — OLANZAPINE 10 MG: 5 TABLET, ORALLY DISINTEGRATING ORAL at 20:28

## 2024-12-25 RX ADMIN — GABAPENTIN 600 MG: 600 TABLET, FILM COATED ORAL at 07:51

## 2024-12-25 RX ADMIN — VALACYCLOVIR 500 MG: 500 TABLET, FILM COATED ORAL at 07:51

## 2024-12-25 RX ADMIN — NICOTINE POLACRILEX 4 MG: 4 GUM, CHEWING BUCCAL at 16:41

## 2024-12-25 RX ADMIN — Medication 1 SPRAY: at 16:41

## 2024-12-25 RX ADMIN — CLONIDINE HYDROCHLORIDE 0.1 MG: 0.1 TABLET ORAL at 09:22

## 2024-12-25 RX ADMIN — DOCUSATE SODIUM 100 MG: 100 CAPSULE, LIQUID FILLED ORAL at 07:51

## 2024-12-25 RX ADMIN — NICOTINE POLACRILEX 4 MG: 4 GUM, CHEWING BUCCAL at 13:50

## 2024-12-25 RX ADMIN — AMOXICILLIN AND CLAVULANATE POTASSIUM 1 TABLET: 875; 125 TABLET, FILM COATED ORAL at 20:12

## 2024-12-25 RX ADMIN — DOCUSATE SODIUM 100 MG: 100 CAPSULE, LIQUID FILLED ORAL at 20:11

## 2024-12-25 RX ADMIN — SERTRALINE HYDROCHLORIDE 50 MG: 50 TABLET ORAL at 07:51

## 2024-12-25 RX ADMIN — OMEPRAZOLE 20 MG: 20 CAPSULE, DELAYED RELEASE ORAL at 07:52

## 2024-12-25 RX ADMIN — GABAPENTIN 600 MG: 600 TABLET, FILM COATED ORAL at 13:50

## 2024-12-25 RX ADMIN — EMPAGLIFLOZIN 10 MG: 10 TABLET, FILM COATED ORAL at 07:52

## 2024-12-25 ASSESSMENT — ACTIVITIES OF DAILY LIVING (ADL)
ADLS_ACUITY_SCORE: 48
LAUNDRY: WITH SUPERVISION
ADLS_ACUITY_SCORE: 48
ADLS_ACUITY_SCORE: 48
HYGIENE/GROOMING: INDEPENDENT
ADLS_ACUITY_SCORE: 48
ADLS_ACUITY_SCORE: 48
LAUNDRY: WITH SUPERVISION
ADLS_ACUITY_SCORE: 48
ORAL_HYGIENE: INDEPENDENT
HYGIENE/GROOMING: INDEPENDENT
ORAL_HYGIENE: INDEPENDENT
ADLS_ACUITY_SCORE: 48
DRESS: INDEPENDENT
ADLS_ACUITY_SCORE: 48
DRESS: INDEPENDENT
ADLS_ACUITY_SCORE: 48
ADLS_ACUITY_SCORE: 48

## 2024-12-25 NOTE — PLAN OF CARE
Goal Outcome Evaluation:    Initial meeting note:    Therapist introduced self to patient and discussed psychotherapy service available to patient.     Pt response: Pt expressed interest in meeting with therapist    Plan: Made plan to meet with pt again; began identifying goals     Pt said session today with therapist was helpful. He was pleased with gifts that were art based gifts.

## 2024-12-25 NOTE — PLAN OF CARE
"Goal Outcome Evaluation:         Patient remains on an SIO (see order).      Present with a flat affect that brightens slightly on approach.  Self reported mood as \"better\" today.  Continues to endorse auditory hallucinations \"that never say good things\".      BS = 144 prior to dinner.    Patient complaint of itching a bite spot.  Consulted with pharmacist who suggested that due to patient already being on Hydroxyzine, Benadryl cream would be a good option, which on call provider order.  Awaiting pharmacy to send to the unit.    Patient compliant with scheduled medications.    PRNs given this shift:  Hydroxyzine 50 mg at 1709 for anxiety.  Ativan 0.5 mg at 1813 for anxiety.  Zyprexa 10 mg at 1920 for agitation/voices.  Nicotine gum x 3 throughout the shift.    Valproic acid level was low today at 46 therefore, Depakene was increased to 500 mg TID.    Patient contracted for safety today.  Was more engaged with this RN.  Appeared to be willing to talk/engage.    Patient evaluation continues.    2300 Benadryl cream applied around wound area of bite to help alleviate itching.  Recovered with dressing.               "

## 2024-12-25 NOTE — PLAN OF CARE
Goal Outcome Evaluation:       Staff on previous shift reported pt had started engaging in self injurious behavior by  rubbing and applying pressure to his middle finger on left hand. Pt did approach desk and reported anxiety rated 8/10, requested and given Ativan PO 0.5 mg 23:52. Pt requested markers to sit in dining area and work on art projects, accompanied by Status Individual Observation (1:1) staff. Slept 5.25 hours during the night, on Status 15 minute safety checks.

## 2024-12-25 NOTE — PLAN OF CARE
Goal Outcome Evaluation:    Pt spends most of the time at the dinning room table. He does art, reads, listens to music and uses aromatherapy. At the start of the shift he said he is doing good.  He talks about how he moves his hands around and unintentionally scratched himself earlier today. He was offered a fidget which he accepts for use in milieu only. He requests prn Ativan for anxiety which was given. Later in the shift he requests Zyprexa 10 mg and said he needs it and would not explain further. A short while later he has a superficial scratch on his left hand ring finger with small amount of blood. He asks for a band aid which was given. Pt said he did this intentionally because the voices were more intense and telling him to harm himself. He denies ongoing urges to self harm at this time. Pt utilizing distraction, prn, music, writing and has SIO in place.   He asks for a copy of his care plan and pt said Sergio told him she would give him a copy of it. Pt told that it would be more appropriate for Sergio to give him and copy and review this with him.   Reports right foot pain is '4'/10 and is tolerable and does not want interventions at this time. He is wearing orthotic shoe on his right foot. Pt has two mepilex dressings on his left wrist/forearm which are intact.   Pt would like ginger ale. Note left for provider.

## 2024-12-25 NOTE — PROGRESS NOTES
"Pt approached nursing desk reporting feeling \"off\", nauseous, and not well. Pt administered zofran at 0922 and reported that it worked for a little bit, but that nausea was back.   Pt requested vitals and blood glucose be checked. See below for vitals. Blood glucose 115.   Pt expressed feeling relief that vitals were WDL. Pt encouraged to rest, orally hydrate, and inform nursing if symptoms increased. Pt expressed understanding.     Vitals taken at 1146  Visit Vitals  /83 (BP Location: Right arm, Patient Position: Sitting, Cuff Size: Adult Large)   Pulse 86   Temp 98.7  F (37.1  C) (Oral)   Resp 18         "

## 2024-12-25 NOTE — PLAN OF CARE
Goal Outcome Evaluation:    Plan of Care Reviewed With: patient      Patient slept 5.5 hours over night shift.  He spends majority of the shift walking in the hallway, coloring in his coloring book and doing his aromatherapy.  He napped for at least an hour this afternoon after attending group.  He reported 5/10 pain in his right foot but declined medication, heat and ice.  He reported high anxiety throughout the shift and was given PRN Ativan, Zyprexa and clonidine.  Patient did not report relief from initial intervention of scheduled medications with PRN Ativan - continued to report high anxiety and rubbed a small amount of skin off his left index finger.  He said this was not an SIB attempt and he shakes his hands when anxious which caused the skin to rub off due to repetition.  Medication compliant and given Nicotine gum frequently.  Also given PRN Zofran for nausea which he reported relieved the nausea and then was able to ate meals in the dining area.Patient contracts for safety on the unit but says he would not be safe if he wasn't in the hospital and on an SIO.  Wound care completed to forearm self-inflicted bite wounds per [facility] protocol.  /83 (BP Location: Right arm, Patient Position: Sitting, Cuff Size: Adult Large)   Pulse 86   Temp 98.7  F (37.1  C) (Oral)   Resp 18   Wt 101.6 kg (224 lb)   SpO2 96%   BMI 35.61 kg/m    .

## 2024-12-26 VITALS
HEART RATE: 81 BPM | TEMPERATURE: 98.1 F | SYSTOLIC BLOOD PRESSURE: 119 MMHG | DIASTOLIC BLOOD PRESSURE: 76 MMHG | OXYGEN SATURATION: 97 % | BODY MASS INDEX: 36.34 KG/M2 | RESPIRATION RATE: 18 BRPM | WEIGHT: 228.6 LBS

## 2024-12-26 LAB
GLUCOSE BLDC GLUCOMTR-MCNC: 114 MG/DL (ref 70–99)
GLUCOSE BLDC GLUCOMTR-MCNC: 199 MG/DL (ref 70–99)

## 2024-12-26 PROCEDURE — 250N000013 HC RX MED GY IP 250 OP 250 PS 637: Performed by: NURSE PRACTITIONER

## 2024-12-26 PROCEDURE — 250N000011 HC RX IP 250 OP 636

## 2024-12-26 PROCEDURE — 250N000013 HC RX MED GY IP 250 OP 250 PS 637: Performed by: PSYCHIATRY & NEUROLOGY

## 2024-12-26 PROCEDURE — 250N000013 HC RX MED GY IP 250 OP 250 PS 637

## 2024-12-26 PROCEDURE — A9270 NON-COVERED ITEM OR SERVICE: HCPCS | Performed by: PSYCHIATRY & NEUROLOGY

## 2024-12-26 PROCEDURE — 250N000013 HC RX MED GY IP 250 OP 250 PS 637: Performed by: CLINICAL NURSE SPECIALIST

## 2024-12-26 PROCEDURE — H2032 ACTIVITY THERAPY, PER 15 MIN: HCPCS

## 2024-12-26 PROCEDURE — 250N000013 HC RX MED GY IP 250 OP 250 PS 637: Performed by: REGISTERED NURSE

## 2024-12-26 PROCEDURE — 99232 SBSQ HOSP IP/OBS MODERATE 35: CPT | Performed by: NURSE PRACTITIONER

## 2024-12-26 PROCEDURE — 124N000002 HC R&B MH UMMC

## 2024-12-26 RX ADMIN — ARIPIPRAZOLE 15 MG: 15 TABLET ORAL at 08:12

## 2024-12-26 RX ADMIN — ACETAMINOPHEN 650 MG: 325 TABLET, FILM COATED ORAL at 16:20

## 2024-12-26 RX ADMIN — BENZOYL PEROXIDE: 50 GEL TOPICAL at 09:22

## 2024-12-26 RX ADMIN — CLONIDINE HYDROCHLORIDE 0.1 MG: 0.1 TABLET ORAL at 14:08

## 2024-12-26 RX ADMIN — CLINDAMYCIN PHOSPHATE: 10 GEL TOPICAL at 09:30

## 2024-12-26 RX ADMIN — AMLODIPINE BESYLATE 5 MG: 5 TABLET ORAL at 08:12

## 2024-12-26 RX ADMIN — ONDANSETRON 4 MG: 4 TABLET, ORALLY DISINTEGRATING ORAL at 10:50

## 2024-12-26 RX ADMIN — SERTRALINE HYDROCHLORIDE 50 MG: 50 TABLET ORAL at 08:12

## 2024-12-26 RX ADMIN — OMEPRAZOLE 20 MG: 20 CAPSULE, DELAYED RELEASE ORAL at 08:12

## 2024-12-26 RX ADMIN — VALACYCLOVIR 500 MG: 500 TABLET, FILM COATED ORAL at 08:12

## 2024-12-26 RX ADMIN — LORAZEPAM 0.5 MG: 0.5 TABLET ORAL at 10:50

## 2024-12-26 RX ADMIN — TESTOSTERONE 100 MG OF TESTOSTERONE: 50 GEL TRANSDERMAL at 09:22

## 2024-12-26 RX ADMIN — ALUMINUM HYDROXIDE, MAGNESIUM HYDROXIDE, AND SIMETHICONE 30 ML: 200; 200; 20 SUSPENSION ORAL at 07:07

## 2024-12-26 RX ADMIN — VALPROIC ACID 500 MG: 250 SOLUTION ORAL at 08:11

## 2024-12-26 RX ADMIN — NICOTINE POLACRILEX 4 MG: 4 GUM, CHEWING BUCCAL at 18:43

## 2024-12-26 RX ADMIN — AMOXICILLIN AND CLAVULANATE POTASSIUM 1 TABLET: 875; 125 TABLET, FILM COATED ORAL at 08:12

## 2024-12-26 RX ADMIN — NICOTINE POLACRILEX 4 MG: 4 GUM, CHEWING BUCCAL at 21:01

## 2024-12-26 RX ADMIN — OLANZAPINE 10 MG: 5 TABLET, ORALLY DISINTEGRATING ORAL at 16:20

## 2024-12-26 RX ADMIN — OLANZAPINE 10 MG: 5 TABLET, ORALLY DISINTEGRATING ORAL at 21:00

## 2024-12-26 RX ADMIN — DIPHENHYDRAMINE HYDROCHLORIDE, ZINC ACETATE: 2; .1 CREAM TOPICAL at 19:14

## 2024-12-26 RX ADMIN — VALPROIC ACID 500 MG: 250 SOLUTION ORAL at 15:01

## 2024-12-26 RX ADMIN — NICOTINE POLACRILEX 4 MG: 4 GUM, CHEWING BUCCAL at 12:01

## 2024-12-26 RX ADMIN — GABAPENTIN 600 MG: 600 TABLET, FILM COATED ORAL at 20:02

## 2024-12-26 RX ADMIN — GABAPENTIN 600 MG: 600 TABLET, FILM COATED ORAL at 15:01

## 2024-12-26 RX ADMIN — LORAZEPAM 0.5 MG: 0.5 TABLET ORAL at 17:28

## 2024-12-26 RX ADMIN — NICOTINE POLACRILEX 4 MG: 4 GUM, CHEWING BUCCAL at 17:32

## 2024-12-26 RX ADMIN — VALPROIC ACID 500 MG: 250 SOLUTION ORAL at 20:02

## 2024-12-26 RX ADMIN — NICOTINE POLACRILEX 4 MG: 4 GUM, CHEWING BUCCAL at 22:04

## 2024-12-26 RX ADMIN — ROSUVASTATIN 40 MG: 20 TABLET, FILM COATED ORAL at 08:12

## 2024-12-26 RX ADMIN — NICOTINE POLACRILEX 4 MG: 4 GUM, CHEWING BUCCAL at 14:08

## 2024-12-26 RX ADMIN — POLYETHYLENE GLYCOL 3350 17 G: 17 POWDER, FOR SOLUTION ORAL at 08:11

## 2024-12-26 RX ADMIN — NICOTINE POLACRILEX 4 MG: 4 GUM, CHEWING BUCCAL at 08:04

## 2024-12-26 RX ADMIN — NICOTINE POLACRILEX 4 MG: 4 GUM, CHEWING BUCCAL at 06:30

## 2024-12-26 RX ADMIN — NICOTINE POLACRILEX 4 MG: 4 GUM, CHEWING BUCCAL at 09:21

## 2024-12-26 RX ADMIN — AMOXICILLIN AND CLAVULANATE POTASSIUM 1 TABLET: 875; 125 TABLET, FILM COATED ORAL at 20:02

## 2024-12-26 RX ADMIN — NICOTINE 1 PATCH: 21 PATCH, EXTENDED RELEASE TRANSDERMAL at 08:23

## 2024-12-26 RX ADMIN — DOCUSATE SODIUM 100 MG: 100 CAPSULE, LIQUID FILLED ORAL at 08:12

## 2024-12-26 RX ADMIN — EMPAGLIFLOZIN 10 MG: 10 TABLET, FILM COATED ORAL at 08:12

## 2024-12-26 RX ADMIN — INSULIN GLARGINE 5 UNITS: 100 INJECTION, SOLUTION SUBCUTANEOUS at 08:29

## 2024-12-26 RX ADMIN — DOCUSATE SODIUM 100 MG: 100 CAPSULE, LIQUID FILLED ORAL at 20:02

## 2024-12-26 RX ADMIN — HYDROXYZINE HYDROCHLORIDE 75 MG: 25 TABLET ORAL at 20:05

## 2024-12-26 RX ADMIN — DIPHENHYDRAMINE HYDROCHLORIDE, ZINC ACETATE: 2; .1 CREAM TOPICAL at 12:10

## 2024-12-26 RX ADMIN — GABAPENTIN 600 MG: 600 TABLET, FILM COATED ORAL at 08:12

## 2024-12-26 ASSESSMENT — ACTIVITIES OF DAILY LIVING (ADL)
ADLS_ACUITY_SCORE: 48

## 2024-12-26 NOTE — PLAN OF CARE
"Goal Outcome Evaluation:    Pt wanted writer to step in to meeting with [facility] .    They were in agreement with the patient version of behavioral plan writer provided.     Prakash is focused on the stigma they feel with BPD diagnosis. They said they had a full psych report, they would like writer and or staff to read it please . Writer said writer would be willing to read, but writer sees some of the BPD symptoms. Writer focused on why is that a negative for them. It doesn't negate, other diagnoses and or past trauma.     They showed a BPD symptom today by writing [place] '[place] name all over their arms in lieu of self harm. Writer didn't mention this to them but observed this. Writer encouraged continued safe behaviors on the unit and using skills. If this safety continues as of Friday morning , they can have a book to read in their room.    [facility]  said pt doesn't seem at baseline. At baseline \"he is more confident in the ability to keep self safe.\"    [facility]  said she is working on getting a new [facility] worker assigned that will focus on skills. Prakash has 10 hours a day of staff contact but she said they don't proactively teach or remind Prakash of skills. She is working on improving this situation.    She said she is also working on proper care for the dog if he needs hospitalization, but that the goal is that the in home workers can provide skills, such as DBT skills, and not the need for multiple hospitalizations.    Randall was meeting with [facility]  for over 90 minutes. Writer will attempt to meet tomorrow for therapy.                            "

## 2024-12-26 NOTE — PLAN OF CARE
"Goal Outcome Evaluation:    Individual Therapy Note      Date of Service: December 25, 2024    Patient: Prakash goes by \"Prakash,\" uses he/him pronouns    Individuals Present: Prakash NATHAN Gonzalez    Session start: 12:10 pm  Session end: 12:35 pm  Session duration in minutes: 25      Modality Used: Person Centered and Motivational Interviewing    Goals: Pt wanted to talk about privileges and behavior plan. Pt indicated to writer it had been 48 hours and they didn't have books back. Writer asked if they were scratching their hands till baking bleeding/ scabs. They said yes but it wasn't intentional , it was just a nervous habit.     Writer said 48 hours would be after any sort of self harm.     Writer and his nurse spoke about if no more scratching, Friday morning he can get a book in his room. He was not given the plan since team has not fully reviewed but nursing was given the plan, so they could work with consistency of care over the holiday.  Writer told him that in lieu of scratching , he is to work on skills with writer. Writer will be providing a packet with skills for grounding and coping with anxiety. Writer also will be working with 1:1 therapy to address underlying trauma as well. Pt processes very well through art. For today, pt was given a pop -it fidget for the milieu. Writer asked him to use it in lieu of scratching .    Patient Description of current symptoms: very anxious about getting belongings back and feeling not ready to go home. He says  also believes , he is not ready to go home. Writer reiterated multiple times the goal for him is to get off of SIO and discharge to home after that.     Mental Status Exam:   Attitude: guarded  Eye Contact: fair  Mood: anxious and sad   Affect: intensity is blunted and intensity is flat  Speech: clear, coherent  Psychomotor Behavior: no evidence of tardive dyskinesia, dystonia, or tics  Thought Process:  linear  Associations: no loose " associations  Thought Content: thoughts of self-harm, which are decreased  Insight: fair  Judgement: limited  Attention Span and Concentration: intact    Pt progress: Pt is improving, but still scratching hands to cope. He was informed that he needs to find healthy safe replacement skills    Treatment Objective(s) Addressed:   The focus of this session was on identifying an appropriate aftercare plan, building distress tolerance, anger management, and assessing safety     Progress Towards Goals and Assessment of Patient:   Patient is making progress towards treatment goals as evidenced by willingness to engage and to keep trying to get better he reports..       Therapeutic Intervention(s):   Provided active listening, unconditional positive regard, and validation.   Understand and identify reasons for hospitalization, how to use the hospital to create change and prevent future hospitalizations , Engaged in cognitive restructuring/ reframing, looked at common cognitive distortions and challenged negative thoughts, Provided positive reinforcement for progress towards goals, gains in knowledge, and application of skills previously taught, Used de-escalation techniques in the moment to reinforce appropriate emotional regulation, Discussed TIPP (body temperature, intense exercise, PMR), Identified stress relief practices, Explored strategies for self-soothing, Engaged in guided discovery, explored patient's perspectives and helped expand them through socratic dialogue, Reviewed healthy living that supports positive mental health, including looking at sleep hygiene, regular movement, nutrition, and regular socialization, and Engaged in exposure therapy, having patient look at fears/fear hierarchy and utilize coping skills to face exposures      Plan/next step: Pt was sleeping when writer returned for 1:1 processing trauma pt had been thinking about. Writer let  pt know that tomorrow, writer will work with him. Today time  ran out.    92064 - Psychotherapy (with patient) - 30 (16-37*) min    Patient Active Problem List   Diagnosis    ADHD (attention deficit hyperactivity disorder)    Marijuana abuse    Polysubstance abuse (H)    GERD (gastroesophageal reflux disease)    Tobacco abuse    Intractable back pain    Optic neuritis    Cauda equina syndrome with neurogenic bladder (H)    Schizoaffective disorder, bipolar type (H)    PTSD (post-traumatic stress disorder)    Anxiety    Auditory hallucination    Class 2 obesity due to excess calories without serious comorbidity with body mass index (BMI) of 36.0 to 36.9 in adult    Nephrolithiasis    Cannabis use disorder, severe, dependence (H)    Depression    History of heroin abuse (H)    Opioid use disorder, severe, dependence (H)    Substance-induced psychotic disorder with hallucinations (H)    Nausea    Urinary retention    Chronic bilateral low back pain without sciatica    AVA (generalized anxiety disorder)    Lumbosacral radiculopathy at L5    Abnormal uterine bleeding    Bipolar affective disorder, mixed, severe, with psychotic behavior (H)    Akathisia    Hypertension, unspecified type    Female-to-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 person    Morbid obesity (H)    Mood disorder due to a general medical condition    Acne vulgaris    Type 2 diabetes mellitus with hyperglycemia, with long-term current use of insulin (H)    Herpes labialis    Major depressive disorder, 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

## 2024-12-26 NOTE — PLAN OF CARE

## 2024-12-26 NOTE — PLAN OF CARE
"  Problem: Adult Behavioral Health Plan of Care  Goal: Patient-Specific Goal (Individualization)  Description: You can add care plan individualizations to a care plan. Examples of Individualization might be:  \"Parent requests to be called daily at 9am for status\", \"I have a hard time hearing out of my right ear\", or \"Do not touch me to wake me up as it startles  me\".  Outcome: Progressing     Problem: Anxiety Signs/Symptoms  Goal: Optimized Energy Level (Anxiety Signs/Symptoms)  Outcome: Progressing     Problem: Skin or Soft Tissue Infection  Goal: Absence of Infection Signs and Symptoms  Outcome: Progressing     Problem: Pain Acute  Goal: Optimal Pain Control and Function  Outcome: Progressing   Goal Outcome Evaluation:        Pt approached this writer by the desk and complained of chest pain, steady and rated at 4/10. He denied difficulty breathing or increase in pain with breathing. He stated that he had similar pain with acid reflux. Maalox administered with some relief. IM page and provider notified. On coming staff notified.                   "
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
## 2024-12-26 NOTE — PLAN OF CARE
Goal Outcome Evaluation:      Group Attendance:  attended full group    Time session began: 10:40 am  Time session ended: 11:15 am  Patient's total time in group: 35    Total # Attendees   3-4   Group Type psychotherapeutic     Group Topic Covered symptom management and healthy coping skills     Group Session Detail Process and ornament making, free time journaling and word finds     Patient's response to the group topic/interactions:  cooperative with task, listened actively, withdrawn, and noted very anxious     Patient Details: Refused check in, worked quietly with his own milieu art materials and journal, new word search from holiday. He asked to meet with writer after [facility] for a few questions.           [id number] - Group psychotherapy - 1 Session  Patient Active Problem List   Diagnosis    ADHD (attention deficit hyperactivity disorder)    Marijuana abuse    Polysubstance abuse (H)    GERD (gastroesophageal reflux disease)    Tobacco abuse    Intractable back pain    Optic neuritis    Cauda equina syndrome with neurogenic bladder (H)    Schizoaffective disorder, bipolar type (H)    PTSD (post-traumatic stress disorder)    Anxiety    Auditory hallucination    Class 2 obesity due to excess calories without serious comorbidity with body mass index (BMI) of 36.0 to 36.9 in adult    Nephrolithiasis    Cannabis use disorder, severe, dependence (H)    Depression    History of heroin abuse (H)    Opioid use disorder, severe, dependence (H)    Substance-induced psychotic disorder with hallucinations (H)    Nausea    Urinary retention    Chronic bilateral low back pain without sciatica    AVA (generalized anxiety disorder)    Lumbosacral radiculopathy at L5    Abnormal uterine bleeding    Bipolar affective disorder, mixed, severe, with psychotic behavior (H)    Akathisia    Hypertension, unspecified type    Female-to-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 person    Morbid obesity (H)    Mood disorder due to a general medical condition    Acne  vulgaris    Type 2 diabetes mellitus with hyperglycemia, with long-term current use of insulin (H)    Herpes labialis    Major depressive disorder, 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

## 2024-12-26 NOTE — PLAN OF CARE
"  Problem: Adult Behavioral Health Plan of Care  Goal: Patient-Specific Goal (Individualization)  Description: You can add care plan individualizations to a care plan. Examples of Individualization might be:  \"Parent requests to be called daily at 9am for status\", \"I have a hard time hearing out of my right ear\", or \"Do not touch me to wake me up as it startles  me\".  Outcome: Progressing     Problem: Anxiety Signs/Symptoms  Goal: Optimized Energy Level (Anxiety Signs/Symptoms)  Outcome: Progressing     Problem: Skin or Soft Tissue Infection  Goal: Absence of Infection Signs and Symptoms  Outcome: Progressing   Goal Outcome Evaluation:       At the beginning of the shift, pt was observed in the dinning area coloring and completing a puzzle. He complained of left foot pain and rated at 5/10. Ibuprofen administered with some relief. He endorsed auditory hallucination, command in nature, telling him to hurt himself. He stated that this is causing him anxiety. He rated anxiety at 8/10, depression 9/10, but denied SI/SIB/HI/VH, and contracted for safety. Ativan administered per pt's request with relief. Pt went to bed and has been sleeping comfortably with even and non labored respirations. Pt continued to be on a 1:1 SIO for safety. No other concerns noted or verbalized.                   "

## 2024-12-26 NOTE — PLAN OF CARE
Pt reported that the friend watching pt's dog had a family emergency and had to fly out.     Left voicemail message with CM and updated her re dog. [phone]

## 2024-12-26 NOTE — PLAN OF CARE
Problem: Suicide Risk  Goal: Absence of Self-Harm  Outcome: Progressing   Goal Outcome Evaluation:    Plan of Care Reviewed With: patient      At start of shift patient reports SIB urges 10/10. He would like Zyprexa or Ativan. He then said he prefers Ativan. Pt informed that it may be too soon to give and provider would need to okay this. Upon hearing this patient gets up and walks briskly to room and lays down in bed. He asks to talk with writer a short while later. Pt interested in [employer] and fidget which was given. He is willing to take Zyprexa and he would like 10 mg which was given. Pt mood noted fo fluctuate quickly. He was interacting with an OT staff that entered the unit and mood was upbeat. A short while later he is agitated and tense.   Pt given prn Ativan for anxiety and has ongoing command hallucinations telling him to harm himself. He is utilizing fidget, watching t.v., reading affirmations and has SIO in place. Pt given praise for refraining from self-harm and coming to staff for help. He reports having a tough day. Has depression with SI and no plan. He commits to safety. Attended evening group. He asked for another prn for urges to harm himself that are intense. He said the coping skills he is utilizing it not enough and needs another prn, he would like Zyprexa 10 mg and this was given.

## 2024-12-26 NOTE — PLAN OF CARE
Problem: Suicidal Behavior  Goal: Suicidal Behavior is Absent or Managed  Outcome: Progressing   Goal Outcome Evaluation:    Plan of Care Reviewed With: patient        Pt has been visible in milieu and cooperative with writer. Pt took all scheduled medications along with as needed nicotine, and ativan x1. Pt reports ongoing command auditory hallucinations telling him to kill himself. However, pt has no plan nor intention to act on these thoughts. Pt denies thoughts to self-injure today. Wounds on L forearm and hand appear slightly improved as compared to the photos on initial assessment. Dressings were changed per orders. At the end of shift pt expressed some anxiety after he learned the person watching his dog had to leave town. [facility] was able to send an email to [facility]  in hopes she can assist pt in finding care for the dog. Pt remains of SIO for safety.

## 2024-12-27 ENCOUNTER — APPOINTMENT (OUTPATIENT)
Dept: GENERAL RADIOLOGY | Facility: CLINIC | Age: 34
End: 2024-12-27
Attending: NURSE PRACTITIONER
Payer: MEDICARE

## 2024-12-27 LAB
GLUCOSE BLDC GLUCOMTR-MCNC: 200 MG/DL (ref 70–99)
GLUCOSE BLDC GLUCOMTR-MCNC: 206 MG/DL (ref 70–99)

## 2024-12-27 PROCEDURE — 97150 GROUP THERAPEUTIC PROCEDURES: CPT | Mod: GO

## 2024-12-27 PROCEDURE — 250N000013 HC RX MED GY IP 250 OP 250 PS 637: Performed by: NURSE PRACTITIONER

## 2024-12-27 PROCEDURE — 124N000002 HC R&B MH UMMC

## 2024-12-27 PROCEDURE — 99233 SBSQ HOSP IP/OBS HIGH 50: CPT | Performed by: NURSE PRACTITIONER

## 2024-12-27 PROCEDURE — 250N000013 HC RX MED GY IP 250 OP 250 PS 637: Performed by: PSYCHIATRY & NEUROLOGY

## 2024-12-27 PROCEDURE — 73630 X-RAY EXAM OF FOOT: CPT | Mod: RT

## 2024-12-27 PROCEDURE — 250N000013 HC RX MED GY IP 250 OP 250 PS 637

## 2024-12-27 PROCEDURE — 250N000013 HC RX MED GY IP 250 OP 250 PS 637: Performed by: CLINICAL NURSE SPECIALIST

## 2024-12-27 PROCEDURE — 250N000013 HC RX MED GY IP 250 OP 250 PS 637: Performed by: REGISTERED NURSE

## 2024-12-27 RX ORDER — GABAPENTIN 600 MG/1
600 TABLET ORAL 2 TIMES DAILY
Status: DISCONTINUED | OUTPATIENT
Start: 2024-12-29 | End: 2025-01-02

## 2024-12-27 RX ORDER — GABAPENTIN 600 MG/1
600 TABLET ORAL 3 TIMES DAILY
Status: COMPLETED | OUTPATIENT
Start: 2024-12-27 | End: 2024-12-28

## 2024-12-27 RX ORDER — LORAZEPAM 0.5 MG/1
0.5 TABLET ORAL EVERY 4 HOURS PRN
Status: DISCONTINUED | OUTPATIENT
Start: 2024-12-27 | End: 2025-01-02

## 2024-12-27 RX ORDER — ARIPIPRAZOLE 20 MG/1
20 TABLET ORAL EVERY MORNING
Status: DISPENSED | OUTPATIENT
Start: 2024-12-27

## 2024-12-27 RX ADMIN — IBUPROFEN 400 MG: 200 TABLET, FILM COATED ORAL at 20:09

## 2024-12-27 RX ADMIN — LORAZEPAM 0.5 MG: 0.5 TABLET ORAL at 22:03

## 2024-12-27 RX ADMIN — NICOTINE POLACRILEX 4 MG: 4 GUM, CHEWING BUCCAL at 15:52

## 2024-12-27 RX ADMIN — LORAZEPAM 0.5 MG: 0.5 TABLET ORAL at 09:25

## 2024-12-27 RX ADMIN — DOCUSATE SODIUM 100 MG: 100 CAPSULE, LIQUID FILLED ORAL at 07:59

## 2024-12-27 RX ADMIN — BENZOYL PEROXIDE: 50 GEL TOPICAL at 10:10

## 2024-12-27 RX ADMIN — LORAZEPAM 0.5 MG: 0.5 TABLET ORAL at 16:08

## 2024-12-27 RX ADMIN — Medication: at 21:08

## 2024-12-27 RX ADMIN — OLANZAPINE 10 MG: 5 TABLET, ORALLY DISINTEGRATING ORAL at 20:57

## 2024-12-27 RX ADMIN — NICOTINE POLACRILEX 4 MG: 4 GUM, CHEWING BUCCAL at 20:58

## 2024-12-27 RX ADMIN — GABAPENTIN 600 MG: 600 TABLET, FILM COATED ORAL at 07:59

## 2024-12-27 RX ADMIN — OMEPRAZOLE 20 MG: 20 CAPSULE, DELAYED RELEASE ORAL at 07:59

## 2024-12-27 RX ADMIN — NICOTINE POLACRILEX 4 MG: 4 GUM, CHEWING BUCCAL at 07:57

## 2024-12-27 RX ADMIN — NICOTINE 1 PATCH: 21 PATCH, EXTENDED RELEASE TRANSDERMAL at 08:09

## 2024-12-27 RX ADMIN — AMOXICILLIN AND CLAVULANATE POTASSIUM 1 TABLET: 875; 125 TABLET, FILM COATED ORAL at 20:05

## 2024-12-27 RX ADMIN — VALPROIC ACID 500 MG: 250 SOLUTION ORAL at 15:52

## 2024-12-27 RX ADMIN — VALACYCLOVIR 500 MG: 500 TABLET, FILM COATED ORAL at 07:59

## 2024-12-27 RX ADMIN — NICOTINE POLACRILEX 4 MG: 4 GUM, CHEWING BUCCAL at 09:25

## 2024-12-27 RX ADMIN — OLANZAPINE 5 MG: 5 TABLET, ORALLY DISINTEGRATING ORAL at 17:32

## 2024-12-27 RX ADMIN — VALPROIC ACID 500 MG: 250 SOLUTION ORAL at 07:58

## 2024-12-27 RX ADMIN — GABAPENTIN 600 MG: 600 TABLET, FILM COATED ORAL at 13:25

## 2024-12-27 RX ADMIN — GABAPENTIN 600 MG: 600 TABLET, FILM COATED ORAL at 19:22

## 2024-12-27 RX ADMIN — NICOTINE POLACRILEX 4 MG: 4 GUM, CHEWING BUCCAL at 17:32

## 2024-12-27 RX ADMIN — DOCUSATE SODIUM 100 MG: 100 CAPSULE, LIQUID FILLED ORAL at 20:05

## 2024-12-27 RX ADMIN — ARIPIPRAZOLE 20 MG: 20 TABLET ORAL at 07:59

## 2024-12-27 RX ADMIN — INSULIN GLARGINE 5 UNITS: 100 INJECTION, SOLUTION SUBCUTANEOUS at 08:11

## 2024-12-27 RX ADMIN — VALPROIC ACID 500 MG: 250 SOLUTION ORAL at 20:58

## 2024-12-27 RX ADMIN — SERTRALINE HYDROCHLORIDE 50 MG: 50 TABLET ORAL at 07:59

## 2024-12-27 RX ADMIN — CLINDAMYCIN PHOSPHATE: 10 GEL TOPICAL at 10:10

## 2024-12-27 RX ADMIN — ACETAMINOPHEN 650 MG: 325 TABLET, FILM COATED ORAL at 07:58

## 2024-12-27 RX ADMIN — NICOTINE POLACRILEX 4 MG: 4 GUM, CHEWING BUCCAL at 06:57

## 2024-12-27 RX ADMIN — EMPAGLIFLOZIN 10 MG: 10 TABLET, FILM COATED ORAL at 07:59

## 2024-12-27 RX ADMIN — HYDROXYZINE HYDROCHLORIDE 75 MG: 25 TABLET ORAL at 20:05

## 2024-12-27 RX ADMIN — ROSUVASTATIN 40 MG: 20 TABLET, FILM COATED ORAL at 07:59

## 2024-12-27 RX ADMIN — AMLODIPINE BESYLATE 5 MG: 5 TABLET ORAL at 07:59

## 2024-12-27 RX ADMIN — ACETAMINOPHEN 650 MG: 325 TABLET, FILM COATED ORAL at 21:25

## 2024-12-27 RX ADMIN — NICOTINE POLACRILEX 4 MG: 4 GUM, CHEWING BUCCAL at 12:58

## 2024-12-27 RX ADMIN — AMOXICILLIN AND CLAVULANATE POTASSIUM 1 TABLET: 875; 125 TABLET, FILM COATED ORAL at 07:59

## 2024-12-27 RX ADMIN — POLYETHYLENE GLYCOL 3350 17 G: 17 POWDER, FOR SOLUTION ORAL at 07:58

## 2024-12-27 RX ADMIN — NICOTINE POLACRILEX 4 MG: 4 GUM, CHEWING BUCCAL at 19:22

## 2024-12-27 RX ADMIN — NICOTINE POLACRILEX 4 MG: 4 GUM, CHEWING BUCCAL at 22:03

## 2024-12-27 ASSESSMENT — ACTIVITIES OF DAILY LIVING (ADL)
ADLS_ACUITY_SCORE: 48
LAUNDRY: WITH SUPERVISION
ORAL_HYGIENE: INDEPENDENT
ADLS_ACUITY_SCORE: 48
HYGIENE/GROOMING: INDEPENDENT
ADLS_ACUITY_SCORE: 48
DRESS: INDEPENDENT
ADLS_ACUITY_SCORE: 48

## 2024-12-27 NOTE — PLAN OF CARE
Upcoming Meetings and Appointments:   Team note: Monday     Tasks Complete:  Chart review and met with team, discussed pt progress, symptomology, and response to treatment.  Discussed the discharge plan and any potential impediments to discharge    Emailed pt's , Jie David, advising of pt's concerns about his dog being left at home alone and needing care.  Per staff, pt has made several attempts to call his  with no success.  Will advise pt of any response received from his .    Left voicemail for art therapy provider Fannie ( Direct Phone: [phone]) to reschedule the appt for this Saturday 12/28.     Called [facility] ([phone]) and spoke with Daysi to reschedule pt's appointments.  Rescheduled [facility] appt for Wednesday, 1/22/25 @0900 with Zohra Rizvi vitual.  Advised by Daysi that pt's individual therapy appt scheduled for 01/02/25 @11AM with Pool Mo needs to be scheduled with a different provider due to current provider leaving the agency.  Scheduled individual therapy new intake appt for Tuesday, 1/7/25 @1400 with Kwame corbin.  Updated AVS with new appointments.      Discharge Plan or Goal   Plan  Discharge home with outpatient supports     Care Team   Current Treatment Providers are:  Primary Physician: Becki Avery with [facility]      Psychiatrist/Medication Management:  Name/Organization: Reports he was seeing a provider at [facility] but wants to find a new provider with whom he feels a better connection with.         :  Name/Organization: Jie David  [facility]     [facility] Apartment: Reports he moved into his apartment in August of this year, location is [place].  Name/Organization: [facility], owner Fatou Martinez       [facility]:  Name/Organization: [facility]   Rescheduled [facility] appt for Wednesday, 1/22/25 @0900 with Zohra valencia.        MARIJA  Worker:  Name/Organization: Silvia Ham  [facility]  Phone:       Art Therapy: [facility]   Called and left message on 12/28/24  time TBD. Pt will need to call provider directly to confirm.     Individual Therapy: [facility]  Scheduled individual therapy new intake appt for Tuesday, 1/7/25 @1400 with Kwame corbin.       Barriers to Discharge   Ongoing stabilization      Referral Status  TBD     Legal Status  Court hold. PD revoked 12/23      Next Steps:     Follow up with rescheduling art therapy provider Fannie ( Direct Phone: [phone]) for the appt on 12/28.  Voicemail left 12/27.    Coverage: please make a referral to unit(s) of  [facility] only if he is to discharge prior to my return or discharge is planned for Monday  [phone] -     Prakash would like a new psych provider- states that his  is referring. Follow up with CM to inquire who the provider will be.. I emailed the  on 12/24 and cc'd Hope luiza to this email. Waiting for a response. Perhaps he can see a psych provider via [facility]

## 2024-12-27 NOTE — PLAN OF CARE

## 2024-12-27 NOTE — CARE PLAN
Rehab Group    Start time: 1015  End time: 1200  Patient time total: 17 minutes    attended partial group    #4 attended   Group Type: occupational therapy   Group Topic Covered: balanced lifestyle, cognitive activities, coping skills, emotional regulation, healthy leisure time, self-esteem, and social skills       Group Session Detail:   Fuse bead projects for creative expression, concentration, attention to detail, fine motor skills, follow through, organization/planning, coping, mood stabilization, reality-based activity, healthy leisure exploration, building self-esteem, and socialization.       Patient Response/Contribution:  socially appropriate and distracted        Patient Detail:  Pt presented with a blunted affect, though they initiated conversation with this writer, who is familiar to them.  Pt declined to work on the planned project, asking if it would be ok to journal.  Pt was given permission to do so, though they were able to concentrate for only a few minutes at a time.  Pt then left to get a one step project from their room.  Pt returned with this and spent the remainder of time they attended finishing a project from a previous time.  Pt left group shortly after this, stating they were having a hard time focusing today.          [id number] OT Group (2 or more in attendance)      Patient Active Problem List   Diagnosis    ADHD (attention deficit hyperactivity disorder)    Marijuana abuse    Polysubstance abuse (H)    GERD (gastroesophageal reflux disease)    Tobacco abuse    Intractable back pain    Optic neuritis    Cauda equina syndrome with neurogenic bladder (H)    Schizoaffective disorder, bipolar type (H)    PTSD (post-traumatic stress disorder)    Anxiety    Auditory hallucination    Class 2 obesity due to excess calories without serious comorbidity with body mass index (BMI) of 36.0 to 36.9 in adult    Nephrolithiasis    Cannabis use disorder, severe, dependence (H)    Depression    History of  heroin abuse (H)    Opioid use disorder, severe, dependence (H)    Substance-induced psychotic disorder with hallucinations (H)    Nausea    Urinary retention    Chronic bilateral low back pain without sciatica    AVA (generalized anxiety disorder)    Lumbosacral radiculopathy at L5    Abnormal uterine bleeding    Bipolar affective disorder, mixed, severe, with psychotic behavior (H)    Akathisia    Hypertension, unspecified type    Female-to-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 person    Morbid obesity (H)    Mood disorder due to a general medical condition    Acne vulgaris    Type 2 diabetes mellitus with hyperglycemia, with long-term current use of insulin (H)    Herpes labialis    Major depressive disorder, 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

## 2024-12-27 NOTE — PROGRESS NOTES
Rehab Group     Start time: 2000  End time: 2050  Patient time total: 15 minutes     attended partial group     #4 attended   Group Type: music   Group Topic Covered: balanced lifestyle, coping skills, healthy leisure time, and mindfulness      Group Session Detail: Cooperatively engaged in Evening Music Relaxation group to decrease anxiety and promote sleep.         Patient Response/Contribution:  polite, fidgety but engaged       Patient Detail: Neutral affect, appropriately engaged in session, responding well to the music.  Attended only the last portion of group tonight.        Activity Therapy Per 15 min ()    Patient Active Problem List   Diagnosis    ADHD (attention deficit hyperactivity disorder)    Marijuana abuse    Polysubstance abuse (H)    GERD (gastroesophageal reflux disease)    Tobacco abuse    Intractable back pain    Optic neuritis    Cauda equina syndrome with neurogenic bladder (H)    Schizoaffective disorder, bipolar type (H)    PTSD (post-traumatic stress disorder)    Anxiety    Auditory hallucination    Class 2 obesity due to excess calories without serious comorbidity with body mass index (BMI) of 36.0 to 36.9 in adult    Nephrolithiasis    Cannabis use disorder, severe, dependence (H)    Depression    History of heroin abuse (H)    Opioid use disorder, severe, dependence (H)    Substance-induced psychotic disorder with hallucinations (H)    Nausea    Urinary retention    Chronic bilateral low back pain without sciatica    AVA (generalized anxiety disorder)    Lumbosacral radiculopathy at L5    Abnormal uterine bleeding    Bipolar affective disorder, mixed, severe, with psychotic behavior (H)    Akathisia    Hypertension, unspecified type    Female-to-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 person    Morbid obesity (H)    Mood disorder due to a general medical condition    Acne vulgaris    Type 2 diabetes mellitus with hyperglycemia, with long-term current use of insulin (H)    Herpes labialis    Major  depressive disorder, 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

## 2024-12-27 NOTE — PLAN OF CARE
Problem: Adult Behavioral Health Plan of Care  Goal: Adheres to Safety Considerations for Self and Others  Outcome: Progressing     Problem: Anxiety Signs/Symptoms  Goal: Improved Sleep (Anxiety Signs/Symptoms)  Outcome: Progressing    Pt slept through the night without distress. Pt reported no pain or discomfort. No problems with behavior. No concerns reported by pt. Pt slept 6 hours.  Staff will continue to monitor.

## 2024-12-27 NOTE — PLAN OF CARE
Goal Outcome Evaluation:    Initial meeting note:    Therapist introduced self to patient and discussed psychotherapy service available to patient.     Pt response: Pt expressed interest in meeting with therapist    Plan: Made plan to meet with pt again; began identifying goals     Writer approached pt in their room, they were awake, lying down with weighted blanket. Writer offered 1;1 therapy/ art making as we had been discussing. He declined.He appeared to be anxious, his room is right across from pt that had code 21, and the noise is making him anxious by observation. He declined therapy,, but would like to try again on Monday.

## 2024-12-27 NOTE — PLAN OF CARE
"Problem: Nonsuicidal Self-Injury  Goal: Improved Behavior Regulation and Impulse Control (Nonsuicidal Self-Injury)  Outcome: Progressing     Problem: Adult Behavioral Health Plan of Care  Goal: Optimized Coping Skills in Response to Life Stressors  Outcome: Progressing     Goal Outcome Evaluation:    Plan of Care Reviewed With: patient      Pt rated his SI/SIB urges at 9/10. Reported anxiety as 9/10 and depression 8/10. Pt took lorazepam at 1615 which was helpful. Pt expressed worry about his dog care which was increasing his anxiety. Pt came to desk at 1715 and reported his dog care had been addressed, reported much relief and stated, \"I guess things do work out.\"     Pt reports increased symptoms in the evening/nights. Pt requested PRN medications throughout evening; expressed concern about long term use of these in an appropriate manner. Pt spent time coloring in lounge and reading book in room per care plan.     Completed wound care to wrist area (bite wound). Pt reported some shame about this. At 2200, pt expressed increased urges to self harm. Discussed that urges may increase after a few days due to withdrawal. Reminded pt how any relief would not last, pt agreed. Pt was able to reflect that he has more coping skills at home and is surrounded by \"good people\". Affect is calmer overall than noted prior shifts. No self-injury reported this evening.    /86   Pulse 101   Temp 98.4  F (36.9  C) (Temporal)   Resp 16   Wt 103.7 kg (228 lb 9.6 oz)   SpO2 98%   BMI 36.34 kg/m  '    "

## 2024-12-27 NOTE — PLAN OF CARE
"  Problem: Nonsuicidal Self-Injury  Goal: Improved Behavior Regulation and Impulse Control (Nonsuicidal Self-Injury)  Outcome: Progressing   Goal Outcome Evaluation:    Plan of Care Reviewed With: patient        Pt has been visible in milieu watching TV, coloring, and reading. Pt reports continued suicidal thoughts including command auditory hallucinations telling him to kill himself. Pt denied thoughts to self-injure at this time. When asked if he had plans or intent to act on his thoughts of suicide he said, I hope not. Pt also reports increased anxiety related to his dog being left alone. Pt was excited to have earned the privilege of keeping his book in his room. Pt was given PRN lorazepam 0.5 mg this morning. The wound on R forearm appears to be healing well. Wound care was completed per plan of care. At 1035 pt approached writer asking if a provider \"about my lip and tongue movements, it's making my gums bleed. I'm wondering if it's TD or something.\" Provider is aware.         "
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
## 2024-12-28 LAB
GLUCOSE BLDC GLUCOMTR-MCNC: 117 MG/DL (ref 70–99)
GLUCOSE BLDC GLUCOMTR-MCNC: 195 MG/DL (ref 70–99)

## 2024-12-28 PROCEDURE — A9270 NON-COVERED ITEM OR SERVICE: HCPCS | Performed by: PSYCHIATRY & NEUROLOGY

## 2024-12-28 PROCEDURE — 250N000013 HC RX MED GY IP 250 OP 250 PS 637

## 2024-12-28 PROCEDURE — 250N000013 HC RX MED GY IP 250 OP 250 PS 637: Performed by: REGISTERED NURSE

## 2024-12-28 PROCEDURE — 250N000013 HC RX MED GY IP 250 OP 250 PS 637: Performed by: NURSE PRACTITIONER

## 2024-12-28 PROCEDURE — 124N000002 HC R&B MH UMMC

## 2024-12-28 PROCEDURE — 250N000013 HC RX MED GY IP 250 OP 250 PS 637: Performed by: CLINICAL NURSE SPECIALIST

## 2024-12-28 PROCEDURE — 250N000011 HC RX IP 250 OP 636

## 2024-12-28 PROCEDURE — 250N000013 HC RX MED GY IP 250 OP 250 PS 637: Performed by: PSYCHIATRY & NEUROLOGY

## 2024-12-28 RX ADMIN — AMOXICILLIN AND CLAVULANATE POTASSIUM 1 TABLET: 875; 125 TABLET, FILM COATED ORAL at 08:57

## 2024-12-28 RX ADMIN — NICOTINE POLACRILEX 4 MG: 4 GUM, CHEWING BUCCAL at 21:45

## 2024-12-28 RX ADMIN — GABAPENTIN 600 MG: 600 TABLET, FILM COATED ORAL at 08:57

## 2024-12-28 RX ADMIN — GABAPENTIN 600 MG: 600 TABLET, FILM COATED ORAL at 20:24

## 2024-12-28 RX ADMIN — DOCUSATE SODIUM 100 MG: 100 CAPSULE, LIQUID FILLED ORAL at 20:24

## 2024-12-28 RX ADMIN — ROSUVASTATIN 40 MG: 20 TABLET, FILM COATED ORAL at 08:56

## 2024-12-28 RX ADMIN — INSULIN GLARGINE 5 UNITS: 100 INJECTION, SOLUTION SUBCUTANEOUS at 08:56

## 2024-12-28 RX ADMIN — CLONIDINE HYDROCHLORIDE 0.1 MG: 0.1 TABLET ORAL at 16:24

## 2024-12-28 RX ADMIN — SERTRALINE HYDROCHLORIDE 50 MG: 50 TABLET ORAL at 08:57

## 2024-12-28 RX ADMIN — VALPROIC ACID 500 MG: 250 SOLUTION ORAL at 08:56

## 2024-12-28 RX ADMIN — NICOTINE POLACRILEX 4 MG: 4 GUM, CHEWING BUCCAL at 10:48

## 2024-12-28 RX ADMIN — NICOTINE 1 PATCH: 21 PATCH, EXTENDED RELEASE TRANSDERMAL at 08:57

## 2024-12-28 RX ADMIN — OLANZAPINE 10 MG: 5 TABLET, ORALLY DISINTEGRATING ORAL at 20:28

## 2024-12-28 RX ADMIN — NICOTINE POLACRILEX 4 MG: 4 GUM, CHEWING BUCCAL at 18:41

## 2024-12-28 RX ADMIN — DOCUSATE SODIUM 100 MG: 100 CAPSULE, LIQUID FILLED ORAL at 08:57

## 2024-12-28 RX ADMIN — BENZOYL PEROXIDE: 50 GEL TOPICAL at 10:03

## 2024-12-28 RX ADMIN — ONDANSETRON 4 MG: 4 TABLET, ORALLY DISINTEGRATING ORAL at 08:13

## 2024-12-28 RX ADMIN — EMPAGLIFLOZIN 10 MG: 10 TABLET, FILM COATED ORAL at 08:57

## 2024-12-28 RX ADMIN — NICOTINE POLACRILEX 4 MG: 4 GUM, CHEWING BUCCAL at 16:22

## 2024-12-28 RX ADMIN — TESTOSTERONE 100 MG OF TESTOSTERONE: 50 GEL TRANSDERMAL at 10:03

## 2024-12-28 RX ADMIN — VALPROIC ACID 500 MG: 250 SOLUTION ORAL at 14:21

## 2024-12-28 RX ADMIN — VALACYCLOVIR 500 MG: 500 TABLET, FILM COATED ORAL at 08:57

## 2024-12-28 RX ADMIN — NICOTINE POLACRILEX 4 MG: 4 GUM, CHEWING BUCCAL at 08:01

## 2024-12-28 RX ADMIN — OMEPRAZOLE 20 MG: 20 CAPSULE, DELAYED RELEASE ORAL at 08:57

## 2024-12-28 RX ADMIN — LORAZEPAM 0.5 MG: 0.5 TABLET ORAL at 18:07

## 2024-12-28 RX ADMIN — AMLODIPINE BESYLATE 5 MG: 5 TABLET ORAL at 08:56

## 2024-12-28 RX ADMIN — NICOTINE POLACRILEX 4 MG: 4 GUM, CHEWING BUCCAL at 14:24

## 2024-12-28 RX ADMIN — NICOTINE POLACRILEX 4 MG: 4 GUM, CHEWING BUCCAL at 17:23

## 2024-12-28 RX ADMIN — NICOTINE POLACRILEX 4 MG: 4 GUM, CHEWING BUCCAL at 20:49

## 2024-12-28 RX ADMIN — NICOTINE POLACRILEX 4 MG: 4 GUM, CHEWING BUCCAL at 12:35

## 2024-12-28 RX ADMIN — NICOTINE POLACRILEX 4 MG: 4 GUM, CHEWING BUCCAL at 06:35

## 2024-12-28 RX ADMIN — LORAZEPAM 0.5 MG: 0.5 TABLET ORAL at 12:55

## 2024-12-28 RX ADMIN — VALPROIC ACID 500 MG: 250 SOLUTION ORAL at 20:24

## 2024-12-28 RX ADMIN — AMOXICILLIN AND CLAVULANATE POTASSIUM 1 TABLET: 875; 125 TABLET, FILM COATED ORAL at 20:24

## 2024-12-28 RX ADMIN — HYDROXYZINE HYDROCHLORIDE 75 MG: 25 TABLET ORAL at 20:26

## 2024-12-28 RX ADMIN — OLANZAPINE 5 MG: 5 TABLET, ORALLY DISINTEGRATING ORAL at 16:22

## 2024-12-28 RX ADMIN — ARIPIPRAZOLE 20 MG: 20 TABLET ORAL at 08:57

## 2024-12-28 RX ADMIN — GABAPENTIN 600 MG: 600 TABLET, FILM COATED ORAL at 14:21

## 2024-12-28 RX ADMIN — CLINDAMYCIN PHOSPHATE: 10 GEL TOPICAL at 10:03

## 2024-12-28 ASSESSMENT — ACTIVITIES OF DAILY LIVING (ADL)
ADLS_ACUITY_SCORE: 48
DRESS: STREET CLOTHES
ADLS_ACUITY_SCORE: 48
ORAL_HYGIENE: INDEPENDENT
ADLS_ACUITY_SCORE: 48
HYGIENE/GROOMING: INDEPENDENT
ADLS_ACUITY_SCORE: 48
ADLS_ACUITY_SCORE: 48
LAUNDRY: WITH SUPERVISION

## 2024-12-28 NOTE — PLAN OF CARE
"Pt appeared to be sleeping on his mattress he set on the floor.  Pt remains on SIO for safety.  Awake at 4 am and approached the desk to request his journal and a snack.  No SIB reported this shift.  Per his behavior plan he has items kept in brown bags at the nurse's desk which he will \"earn\" back.  No PRN medications requested this shift.  Approximately 5.25 hours of sleep is recorded.   "

## 2024-12-28 NOTE — PLAN OF CARE
Problem: Suicidal Behavior  Goal: Suicidal Behavior is Absent or Managed  Outcome: Progressing   Goal Outcome Evaluation:    Plan of Care Reviewed With: patient        Pt reports he is crabby. He tells this writer overnight staff informed him he was tossing and turning a lot throughout the night. Despite being crabby pt says he feels his anxiety is reduced and his mood is improved. Pt confirms he continues to have command auditory hallucinations telling him to harm himself, but he was able to commit to safety and says he would seek out staff if he were to develop intent to act on these thoughts. BG before breakfast was 195. Pt postponed eating breakfast and received PRN zofran. Pt appeared to brighten when writer acknowledged the improvement and progress towards discharge and returning to his own bed at home.    During the afternoon pt requested and received a dose of PRN lorazepam. Pt has also been using nicotine gum throughout shift. Pt had multiple items in his room despite the behavior plan indicating only one book has been earned back at this time. Pt was agreeable to removing the unearned items from his room and having them kept behind the desk as agreed. During the discussion pt reported the items ended up in his room because he did not want them left in the lounge. Pt expressed frustration about the behavior plan being unclear. Pt was reminded that one item would be allowed to be kept in his room after each period of 48 in which he maintains safety. The second item is due to be returned on Sunday, 12/29 on the day shift if there are no unsafe behaviors between now and then. Pt agreed that going forward he would drop off any unearned items at the desk for safer keeping while not being used.

## 2024-12-29 LAB
GLUCOSE BLDC GLUCOMTR-MCNC: 135 MG/DL (ref 70–99)
GLUCOSE BLDC GLUCOMTR-MCNC: 220 MG/DL (ref 70–99)
VALPROATE SERPL-MCNC: 47.8 UG/ML

## 2024-12-29 PROCEDURE — 250N000013 HC RX MED GY IP 250 OP 250 PS 637: Performed by: REGISTERED NURSE

## 2024-12-29 PROCEDURE — 250N000011 HC RX IP 250 OP 636

## 2024-12-29 PROCEDURE — 250N000011 HC RX IP 250 OP 636: Performed by: PHYSICIAN ASSISTANT

## 2024-12-29 PROCEDURE — 250N000013 HC RX MED GY IP 250 OP 250 PS 637: Performed by: PSYCHIATRY & NEUROLOGY

## 2024-12-29 PROCEDURE — A9270 NON-COVERED ITEM OR SERVICE: HCPCS | Performed by: PSYCHIATRY & NEUROLOGY

## 2024-12-29 PROCEDURE — 250N000013 HC RX MED GY IP 250 OP 250 PS 637: Performed by: NURSE PRACTITIONER

## 2024-12-29 PROCEDURE — 250N000013 HC RX MED GY IP 250 OP 250 PS 637

## 2024-12-29 PROCEDURE — 124N000002 HC R&B MH UMMC

## 2024-12-29 PROCEDURE — 82550 ASSAY OF CK (CPK): CPT | Performed by: PHYSICIAN ASSISTANT

## 2024-12-29 PROCEDURE — 36415 COLL VENOUS BLD VENIPUNCTURE: CPT | Performed by: NURSE PRACTITIONER

## 2024-12-29 PROCEDURE — 250N000013 HC RX MED GY IP 250 OP 250 PS 637: Performed by: PHYSICIAN ASSISTANT

## 2024-12-29 PROCEDURE — 80164 ASSAY DIPROPYLACETIC ACD TOT: CPT | Performed by: NURSE PRACTITIONER

## 2024-12-29 PROCEDURE — 999N000147 HC STATISTIC PT IP EVAL DEFER

## 2024-12-29 PROCEDURE — 250N000013 HC RX MED GY IP 250 OP 250 PS 637: Performed by: CLINICAL NURSE SPECIALIST

## 2024-12-29 RX ORDER — IBUPROFEN 200 MG
400-600 TABLET ORAL EVERY 6 HOURS PRN
Status: DISPENSED | OUTPATIENT
Start: 2024-12-29

## 2024-12-29 RX ORDER — LIDOCAINE 40 MG/G
CREAM TOPICAL
Status: ACTIVE | OUTPATIENT
Start: 2024-12-29

## 2024-12-29 RX ORDER — IBUPROFEN 200 MG
400-600 TABLET ORAL EVERY 8 HOURS PRN
Status: DISCONTINUED | OUTPATIENT
Start: 2024-12-29 | End: 2024-12-29

## 2024-12-29 RX ORDER — ACETAMINOPHEN 500 MG
500 TABLET ORAL ONCE
Status: COMPLETED | OUTPATIENT
Start: 2024-12-29 | End: 2024-12-29

## 2024-12-29 RX ORDER — IBUPROFEN 200 MG
400 TABLET ORAL ONCE
Status: COMPLETED | OUTPATIENT
Start: 2024-12-29 | End: 2024-12-29

## 2024-12-29 RX ORDER — LIDOCAINE 4 G/G
1-3 PATCH TOPICAL DAILY PRN
Status: DISPENSED | OUTPATIENT
Start: 2024-12-29

## 2024-12-29 RX ORDER — KETOROLAC TROMETHAMINE 30 MG/ML
30 INJECTION, SOLUTION INTRAMUSCULAR; INTRAVENOUS ONCE
Status: COMPLETED | OUTPATIENT
Start: 2024-12-29 | End: 2024-12-29

## 2024-12-29 RX ORDER — OXYCODONE HYDROCHLORIDE 5 MG/1
5 TABLET ORAL ONCE
Status: COMPLETED | OUTPATIENT
Start: 2024-12-29 | End: 2024-12-29

## 2024-12-29 RX ADMIN — CLINDAMYCIN PHOSPHATE: 10 GEL TOPICAL at 07:48

## 2024-12-29 RX ADMIN — NICOTINE POLACRILEX 4 MG: 4 GUM, CHEWING BUCCAL at 10:06

## 2024-12-29 RX ADMIN — IBUPROFEN 400 MG: 200 TABLET, FILM COATED ORAL at 09:52

## 2024-12-29 RX ADMIN — GABAPENTIN 600 MG: 600 TABLET, FILM COATED ORAL at 07:46

## 2024-12-29 RX ADMIN — ACETAMINOPHEN 650 MG: 325 TABLET, FILM COATED ORAL at 18:51

## 2024-12-29 RX ADMIN — NICOTINE 1 PATCH: 21 PATCH, EXTENDED RELEASE TRANSDERMAL at 07:51

## 2024-12-29 RX ADMIN — LORAZEPAM 0.5 MG: 0.5 TABLET ORAL at 14:03

## 2024-12-29 RX ADMIN — OXYCODONE HYDROCHLORIDE 5 MG: 5 TABLET ORAL at 23:03

## 2024-12-29 RX ADMIN — HYDROXYZINE HYDROCHLORIDE 75 MG: 25 TABLET ORAL at 22:33

## 2024-12-29 RX ADMIN — CYCLOBENZAPRINE HYDROCHLORIDE 10 MG: 5 TABLET, FILM COATED ORAL at 17:39

## 2024-12-29 RX ADMIN — NICOTINE POLACRILEX 4 MG: 4 GUM, CHEWING BUCCAL at 22:34

## 2024-12-29 RX ADMIN — SERTRALINE HYDROCHLORIDE 50 MG: 50 TABLET ORAL at 07:47

## 2024-12-29 RX ADMIN — DOCUSATE SODIUM 100 MG: 100 CAPSULE, LIQUID FILLED ORAL at 07:47

## 2024-12-29 RX ADMIN — LORAZEPAM 0.5 MG: 0.5 TABLET ORAL at 20:05

## 2024-12-29 RX ADMIN — LIDOCAINE 1 PATCH: 4 PATCH TOPICAL at 11:21

## 2024-12-29 RX ADMIN — ALUMINUM HYDROXIDE, MAGNESIUM HYDROXIDE, AND SIMETHICONE 30 ML: 200; 200; 20 SUSPENSION ORAL at 18:15

## 2024-12-29 RX ADMIN — OMEPRAZOLE 20 MG: 20 CAPSULE, DELAYED RELEASE ORAL at 07:46

## 2024-12-29 RX ADMIN — IBUPROFEN 400 MG: 200 TABLET, FILM COATED ORAL at 05:59

## 2024-12-29 RX ADMIN — AMLODIPINE BESYLATE 5 MG: 5 TABLET ORAL at 07:47

## 2024-12-29 RX ADMIN — DOCUSATE SODIUM 100 MG: 100 CAPSULE, LIQUID FILLED ORAL at 20:00

## 2024-12-29 RX ADMIN — TESTOSTERONE 100 MG OF TESTOSTERONE: 50 GEL TRANSDERMAL at 07:47

## 2024-12-29 RX ADMIN — BENZOYL PEROXIDE: 50 GEL TOPICAL at 07:47

## 2024-12-29 RX ADMIN — LIDOCAINE 2 PATCH: 4 PATCH TOPICAL at 13:35

## 2024-12-29 RX ADMIN — GABAPENTIN 600 MG: 600 TABLET, FILM COATED ORAL at 20:10

## 2024-12-29 RX ADMIN — ONDANSETRON 4 MG: 4 TABLET, ORALLY DISINTEGRATING ORAL at 20:45

## 2024-12-29 RX ADMIN — CLONIDINE HYDROCHLORIDE 0.1 MG: 0.1 TABLET ORAL at 09:18

## 2024-12-29 RX ADMIN — CYCLOBENZAPRINE HYDROCHLORIDE 10 MG: 5 TABLET, FILM COATED ORAL at 10:23

## 2024-12-29 RX ADMIN — NICOTINE POLACRILEX 4 MG: 4 GUM, CHEWING BUCCAL at 17:40

## 2024-12-29 RX ADMIN — LORAZEPAM 0.5 MG: 0.5 TABLET ORAL at 23:54

## 2024-12-29 RX ADMIN — NICOTINE POLACRILEX 4 MG: 4 GUM, CHEWING BUCCAL at 20:33

## 2024-12-29 RX ADMIN — ACETAMINOPHEN 500 MG: 500 TABLET ORAL at 11:21

## 2024-12-29 RX ADMIN — KETOROLAC TROMETHAMINE 30 MG: 30 INJECTION, SOLUTION INTRAMUSCULAR at 20:08

## 2024-12-29 RX ADMIN — NICOTINE POLACRILEX 4 MG: 4 GUM, CHEWING BUCCAL at 08:03

## 2024-12-29 RX ADMIN — NICOTINE POLACRILEX 4 MG: 4 GUM, CHEWING BUCCAL at 15:07

## 2024-12-29 RX ADMIN — ROSUVASTATIN 40 MG: 20 TABLET, FILM COATED ORAL at 07:47

## 2024-12-29 RX ADMIN — NICOTINE POLACRILEX 4 MG: 4 GUM, CHEWING BUCCAL at 07:04

## 2024-12-29 RX ADMIN — VALACYCLOVIR 500 MG: 500 TABLET, FILM COATED ORAL at 07:46

## 2024-12-29 RX ADMIN — VALPROIC ACID 500 MG: 250 SOLUTION ORAL at 07:48

## 2024-12-29 RX ADMIN — NICOTINE POLACRILEX 4 MG: 4 GUM, CHEWING BUCCAL at 14:03

## 2024-12-29 RX ADMIN — ACETAMINOPHEN 650 MG: 325 TABLET, FILM COATED ORAL at 09:23

## 2024-12-29 RX ADMIN — NICOTINE POLACRILEX 4 MG: 4 GUM, CHEWING BUCCAL at 12:46

## 2024-12-29 RX ADMIN — VALPROIC ACID 500 MG: 250 SOLUTION ORAL at 14:03

## 2024-12-29 RX ADMIN — EMPAGLIFLOZIN 10 MG: 10 TABLET, FILM COATED ORAL at 07:46

## 2024-12-29 RX ADMIN — ARIPIPRAZOLE 20 MG: 20 TABLET ORAL at 07:47

## 2024-12-29 RX ADMIN — HYDROXYZINE HYDROCHLORIDE 50 MG: 25 TABLET ORAL at 18:54

## 2024-12-29 RX ADMIN — ONDANSETRON 4 MG: 4 TABLET, ORALLY DISINTEGRATING ORAL at 12:06

## 2024-12-29 RX ADMIN — DICLOFENAC SODIUM TOPICAL GEL, 1% 2 G: 10 GEL TOPICAL at 09:52

## 2024-12-29 RX ADMIN — IBUPROFEN 600 MG: 200 TABLET, FILM COATED ORAL at 17:39

## 2024-12-29 RX ADMIN — VALPROIC ACID 500 MG: 250 SOLUTION ORAL at 22:33

## 2024-12-29 RX ADMIN — Medication 2 G: at 19:14

## 2024-12-29 RX ADMIN — INSULIN GLARGINE 5 UNITS: 100 INJECTION, SOLUTION SUBCUTANEOUS at 07:48

## 2024-12-29 ASSESSMENT — ACTIVITIES OF DAILY LIVING (ADL)
ADLS_ACUITY_SCORE: 48
HYGIENE/GROOMING: INDEPENDENT
ADLS_ACUITY_SCORE: 48

## 2024-12-29 NOTE — PLAN OF CARE
"Problem: Pain Acute  Goal: Optimal Pain Control and Function  Outcome: Not Progressing at time of note     Goal Outcome Evaluation:    Pt was napping early in evening. At 530 pm, BG was 135. Pt said he was having restless sleep. Pt reported pain of 8/10 in lower back, reported radiating bilaterally down back of legs and front of thigh. Reports pain in left leg is worse. Stated he would have gone to the ED if he was not in hospital, suggested he might benefit from a \"Toradol shot.\"    Pt removed lidocaine patches, instead trying heat packs. At 1740, administered 600 mg ibuprofen and 10 mg of flexeril. Provided foam mattress for bed. Changed out sheets, pillow cases.  Pt denied any appetite, encouraged to eat snack with ibuprofen. Pt was given Maalox for mild nausea.     At 1845, Pt was given 650 mg tylenol and hydroxyzine 50 mg and is currently reporting pain is 9/10. Pt was laying on his stomach on floor and was trying to find a more comfortable position. Pt walked hallway x 2 to see if that might help. Declined icy hot. Administered Volteran at 1914. Pt is sitting on bed, leaning forward and appears to be guarding. Pain continues at 9/10 no relief.      On-call provider notified. Order received for ketorolac 30 mg IM, which was administered in left deltoid.  At 850, pt requested Zofran which was provided. Pt denied any pain improvement at 9 pm, does acknowledge feeling calmer.      Pt had denied any thoughts of self harm and only transient SI earlier in day. At 2100, however, pt reported having SI with \"lightning jolts\" going down his left leg. Pain is 9.5 and denies relief from ketorolac. On-call notified of shooting pains. Plan to speak with pt to determine next steps.          /83 (BP Location: Right arm, Patient Position: Sitting, Cuff Size: Adult Large)   Pulse 87   Temp 98  F (36.7  C)   Resp 18   Wt 103.7 kg (228 lb 9.6 oz)   SpO2 97%   BMI 36.34 kg/m      "

## 2024-12-29 NOTE — PLAN OF CARE
Pt appeared to be sleeping early in the night.  SIO staff at bedside for safety.  No SIB reported this shift.  Pt is awake at 3:30 am and requested his coloring activity book while he sits in the dining area.  He returned to rest in bed at 4:30 am.    Approximately 4 hours of sleep is recorded tonight.

## 2024-12-29 NOTE — PLAN OF CARE
Problem: Nonsuicidal Self-Injury  Goal: Improved Behavior Regulation and Impulse Control (Nonsuicidal Self-Injury)  Outcome: Progressing     Goal Outcome Evaluation:    Plan of Care Reviewed With: patient      Pt had elevated BP of 150/104 at 1600. Requested frequent PRNs. Administered PRN clonidine (see recheck BP below) and olanzapine 5 mg at about 1630. Administered lorazepam 0.5 mg at 1800.   Of note, pt admitted to improved anxiety and depression and denied any SIB thoughts. Pt had a visitor and then reported having increased anxiety about wanting to go home. Asked RN several times if he might be able to go home this week.     Pt was accepting of plan to get journal tomorrow per care plan. Pt reported some irritability about events from this morning but did perseverate on topic. Positive reinforcement provided for pt's ability to let things go this evening. Close to bedtime pt asked for PRN due to perception of panic attack. Encouraged pursed-lip breathing and to spend time in OT room. 10 mg olanzapine was administered at 2030. Admitted he felt triggered by a peer on unit. Pt planned on asking for another lorazepam at 2200 but did not ask for it. No self harming incident this evening.     /88   Pulse 80   Temp 98.1  F (36.7  C) (Temporal)   Resp 16   Wt 103.7 kg (228 lb 9.6 oz)   SpO2 98%   BMI 36.34 kg/m
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
## 2024-12-29 NOTE — PLAN OF CARE
Problem: Suicidal Behavior  Goal: Suicidal Behavior is Absent or Managed  Outcome: Progressing   Goal Outcome Evaluation:    Plan of Care Reviewed With: patient        Pt has remained in milieu appropriately engaged in activities like coloring and journaling for nearly the entire shift. Pt reports his mood is improved, his anxiety is minimal and he feels he is getting close to ready for discharge Pt had a bright affect, and had a productive conversation with writer about managing his mental health in the community while managing his other obligations along with any unexpected stressors. Pt reported only transient thoughts of suicide when his back pain initially exacerbated but denied thoughts to self-injure. Pt reported quieter auditory hallucinations but said he has not heard any commands today. Please see earlier note by this writer regarding acute back pain. Patient has earned the privilege to keep his book and journal in his room. All other items should continue to be stored behind the unit desk when not in use by patient in the common areas.

## 2024-12-29 NOTE — PROGRESS NOTES
"Brief Medicine Follow Up Note    Following up regarding chronic lower back pain w/ sciatica.     Today's vital signs, medications, and nursing notes were reviewed. Labs reviewed most recent serum and urine laboratories.     /74 (BP Location: Right arm, Patient Position: Sitting, Cuff Size: Adult Large)   Pulse 80   Temp 98.1  F (36.7  C) (Temporal)   Resp 18   Wt 103.7 kg (228 lb 9.6 oz)   SpO2 98%   BMI 36.34 kg/m      A/P:  Chronic Lower Back Pain w/ Sciatica, Bilateral (s/p L5 S1 decompression discectomy 12/24/16)   Hx of Cauda Equina Compression  Medicine contacted this AM regarding acute-on-chronic lower back pain with \"restless\" thighs, but not associated with any new neurologic changes; specifically no new foot drop, no saddle anesthesia, no bowel/bladder incontinence, no new numbness/tingling in BLEs from baseline. RN discussed w/ pt who notes this pain is similar to his chronic pain, just slightly heightened this AM, no particular aggravating factors. Chart review shows longstanding hx of chronic lower back pain & sciatica and hx of multiple back surgeries.  - Multimodal pain mgmt as follows:  - Continue with Tylenol + Ibuprofen PRN, recommend alternating the two. I also increased dosing & frequency of both (now Tylenol 650mg Q4H PRN, and Ibuprofen 400-600mg Q6H PRN. Can also change Tylenol to 975mg Q8H PRN if pt wishes)  - Continue PTA Gabapentin  - Continue PTA Flexeril 10mg TID PRN  - Continue Voltaren gel to back (will schedule this from an PRN med)  - Will also add on IcyHot patches and Lidocaine patches to be used PRN  - Ordered PT consult   - Discussed red flag symptoms w/ RN to be cognizant of; notify Medicine immediately if new bowel/bladder incontinence, worsening pain despite more aggressive multimodal pain regimen, new neurologic deficits in BLEs    Medicine will sign off. No further recommendations at this time.  Please feel free to reconsult if any new medical issues or concerns.  " Thank you for the opportunity to care for this patient.     Pool Mclean PA-C  [facility]  Contact information available via [facility] Paging/Directory    Medicine will sign off. No further recommendations at this time.  Please feel free to reconsult if any new medical issues or concerns.  Thank you for the opportunity to care for this patient.

## 2024-12-30 LAB
CK SERPL-CCNC: 165 U/L (ref 26–308)
GLUCOSE BLDC GLUCOMTR-MCNC: 163 MG/DL (ref 70–99)
GLUCOSE BLDC GLUCOMTR-MCNC: 216 MG/DL (ref 70–99)

## 2024-12-30 PROCEDURE — 250N000011 HC RX IP 250 OP 636

## 2024-12-30 PROCEDURE — 250N000013 HC RX MED GY IP 250 OP 250 PS 637: Performed by: REGISTERED NURSE

## 2024-12-30 PROCEDURE — 250N000011 HC RX IP 250 OP 636: Performed by: REGISTERED NURSE

## 2024-12-30 PROCEDURE — A9585 GADOBUTROL INJECTION: HCPCS | Performed by: PHYSICIAN ASSISTANT

## 2024-12-30 PROCEDURE — 250N000013 HC RX MED GY IP 250 OP 250 PS 637

## 2024-12-30 PROCEDURE — 250N000013 HC RX MED GY IP 250 OP 250 PS 637: Performed by: CLINICAL NURSE SPECIALIST

## 2024-12-30 PROCEDURE — 255N000002 HC RX 255 OP 636: Performed by: PHYSICIAN ASSISTANT

## 2024-12-30 PROCEDURE — 124N000002 HC R&B MH UMMC

## 2024-12-30 PROCEDURE — 90832 PSYTX W PT 30 MINUTES: CPT | Performed by: COUNSELOR

## 2024-12-30 PROCEDURE — 250N000013 HC RX MED GY IP 250 OP 250 PS 637: Performed by: PSYCHIATRY & NEUROLOGY

## 2024-12-30 PROCEDURE — 250N000013 HC RX MED GY IP 250 OP 250 PS 637: Performed by: NURSE PRACTITIONER

## 2024-12-30 PROCEDURE — G0463 HOSPITAL OUTPT CLINIC VISIT: HCPCS

## 2024-12-30 RX ORDER — KETOROLAC TROMETHAMINE 30 MG/ML
30 INJECTION, SOLUTION INTRAMUSCULAR; INTRAVENOUS ONCE
Status: COMPLETED | OUTPATIENT
Start: 2024-12-30 | End: 2024-12-30

## 2024-12-30 RX ORDER — GADOBUTROL 604.72 MG/ML
0.1 INJECTION INTRAVENOUS ONCE
Status: COMPLETED | OUTPATIENT
Start: 2024-12-30 | End: 2024-12-30

## 2024-12-30 RX ORDER — OXYCODONE HYDROCHLORIDE 5 MG/1
5 TABLET ORAL ONCE
Status: COMPLETED | OUTPATIENT
Start: 2024-12-30 | End: 2024-12-30

## 2024-12-30 RX ADMIN — NICOTINE POLACRILEX 4 MG: 4 GUM, CHEWING BUCCAL at 11:32

## 2024-12-30 RX ADMIN — CYCLOBENZAPRINE HYDROCHLORIDE 10 MG: 5 TABLET, FILM COATED ORAL at 21:39

## 2024-12-30 RX ADMIN — NICOTINE POLACRILEX 4 MG: 4 GUM, CHEWING BUCCAL at 14:18

## 2024-12-30 RX ADMIN — ACETAMINOPHEN 650 MG: 325 TABLET, FILM COATED ORAL at 01:34

## 2024-12-30 RX ADMIN — OMEPRAZOLE 20 MG: 20 CAPSULE, DELAYED RELEASE ORAL at 09:22

## 2024-12-30 RX ADMIN — OXYCODONE 5 MG: 5 TABLET ORAL at 17:45

## 2024-12-30 RX ADMIN — NICOTINE POLACRILEX 4 MG: 4 GUM, CHEWING BUCCAL at 18:19

## 2024-12-30 RX ADMIN — ARIPIPRAZOLE 20 MG: 20 TABLET ORAL at 09:22

## 2024-12-30 RX ADMIN — NICOTINE 1 PATCH: 21 PATCH, EXTENDED RELEASE TRANSDERMAL at 09:26

## 2024-12-30 RX ADMIN — ACETAMINOPHEN 650 MG: 325 TABLET, FILM COATED ORAL at 10:49

## 2024-12-30 RX ADMIN — SERTRALINE HYDROCHLORIDE 50 MG: 50 TABLET ORAL at 09:22

## 2024-12-30 RX ADMIN — ROSUVASTATIN 40 MG: 20 TABLET, FILM COATED ORAL at 09:22

## 2024-12-30 RX ADMIN — HYDROXYZINE HYDROCHLORIDE 75 MG: 25 TABLET ORAL at 21:38

## 2024-12-30 RX ADMIN — LORAZEPAM 0.5 MG: 0.5 TABLET ORAL at 11:23

## 2024-12-30 RX ADMIN — NICOTINE POLACRILEX 4 MG: 4 GUM, CHEWING BUCCAL at 19:27

## 2024-12-30 RX ADMIN — NICOTINE POLACRILEX 4 MG: 4 GUM, CHEWING BUCCAL at 11:27

## 2024-12-30 RX ADMIN — NICOTINE POLACRILEX 4 MG: 4 GUM, CHEWING BUCCAL at 07:18

## 2024-12-30 RX ADMIN — ACETAMINOPHEN 650 MG: 325 TABLET, FILM COATED ORAL at 21:39

## 2024-12-30 RX ADMIN — VALACYCLOVIR 500 MG: 500 TABLET, FILM COATED ORAL at 09:21

## 2024-12-30 RX ADMIN — LORAZEPAM 0.5 MG: 0.5 TABLET ORAL at 19:37

## 2024-12-30 RX ADMIN — CYCLOBENZAPRINE HYDROCHLORIDE 10 MG: 5 TABLET, FILM COATED ORAL at 01:34

## 2024-12-30 RX ADMIN — NICOTINE POLACRILEX 4 MG: 4 GUM, CHEWING BUCCAL at 09:24

## 2024-12-30 RX ADMIN — GADOBUTROL 10.37 ML: 604.72 INJECTION INTRAVENOUS at 01:02

## 2024-12-30 RX ADMIN — IBUPROFEN 600 MG: 200 TABLET, FILM COATED ORAL at 07:45

## 2024-12-30 RX ADMIN — DOCUSATE SODIUM 100 MG: 100 CAPSULE, LIQUID FILLED ORAL at 21:39

## 2024-12-30 RX ADMIN — AMLODIPINE BESYLATE 5 MG: 5 TABLET ORAL at 09:21

## 2024-12-30 RX ADMIN — KETOROLAC TROMETHAMINE 30 MG: 30 INJECTION, SOLUTION INTRAMUSCULAR at 11:34

## 2024-12-30 RX ADMIN — GABAPENTIN 600 MG: 600 TABLET, FILM COATED ORAL at 09:21

## 2024-12-30 RX ADMIN — NICOTINE POLACRILEX 4 MG: 4 GUM, CHEWING BUCCAL at 16:37

## 2024-12-30 RX ADMIN — EMPAGLIFLOZIN 10 MG: 10 TABLET, FILM COATED ORAL at 09:22

## 2024-12-30 RX ADMIN — GABAPENTIN 600 MG: 600 TABLET, FILM COATED ORAL at 21:39

## 2024-12-30 RX ADMIN — INSULIN GLARGINE 5 UNITS: 100 INJECTION, SOLUTION SUBCUTANEOUS at 07:51

## 2024-12-30 RX ADMIN — NICOTINE POLACRILEX 4 MG: 4 GUM, CHEWING BUCCAL at 21:11

## 2024-12-30 RX ADMIN — HYDROXYZINE HYDROCHLORIDE 50 MG: 25 TABLET ORAL at 17:39

## 2024-12-30 RX ADMIN — VALPROIC ACID 500 MG: 250 SOLUTION ORAL at 14:16

## 2024-12-30 RX ADMIN — VALPROIC ACID 500 MG: 250 SOLUTION ORAL at 21:40

## 2024-12-30 RX ADMIN — ONDANSETRON 4 MG: 4 TABLET, ORALLY DISINTEGRATING ORAL at 10:12

## 2024-12-30 RX ADMIN — VALPROIC ACID 500 MG: 250 SOLUTION ORAL at 09:23

## 2024-12-30 RX ADMIN — DOCUSATE SODIUM 100 MG: 100 CAPSULE, LIQUID FILLED ORAL at 09:22

## 2024-12-30 RX ADMIN — NICOTINE POLACRILEX 4 MG: 4 GUM, CHEWING BUCCAL at 17:55

## 2024-12-30 ASSESSMENT — ACTIVITIES OF DAILY LIVING (ADL)
ADLS_ACUITY_SCORE: 48

## 2024-12-30 NOTE — PLAN OF CARE
Upcoming Meetings and Appointments:   Team note: Monday     Tasks Complete:  Chart review and met with team, discussed pt progress, symptomology, and response to treatment.  Discussed the discharge plan and any potential impediments to discharge    According to the coverage , Prakash indicated that his friend had dropped off his dog at his apartment last Thursday. This implies that the dog may not have had access to the outdoors, food, or water for at least five days. Today, Prakash has informed the team that his dog is fine and that his friend currently has the dog. His situation echoes Prakash s circumstances upon admission, where he experienced a phone disagreement with his friend. At that time, Prakash communicated to staff that he lacked the support necessary to care for the dog. After a day of problem-solving, it was confirmed that his friend still had possession of the dog.We encourage Prakash to actively problem-solve this recurring issue, as it appears there is a behavioral pattern of chaos when disagreements occur between Prakash and his friend via phone. Furthermore, we recommend that the case management team inform Prakash that we have explored all available resources and encourage him to continue engaging with his natural supports. If he is unable to do so, we should offer to contact animal control immediately for assistance.We recommend that his outpatient support team collaborate to develop a safety plan for Prakash's pet care during his hospitalizations to ensure his dog receives adequate care. Additionally, it may be helpful for his therapist or support services to discuss Prakash's current ability to care for his pet, given the frequency of his hospitalizations. Exploring the option of fostering his dog temporarily could be a viable solution until Prakash is able to provide a stable environment for his pet.    Interventional psychiatry scheduled     Per patient, his  has  recommendations for psychiatry. I made two attempts to request additional information. TBD. I called [facility] on behalf of pt to request a new provider . I was informed that Prakash has been discharged from [facility] completely due to not complying signing the required release of information needed to provide care     Discharge Plan or Goal   Plan  Discharge home with outpatient supports     Care Team   Current Treatment Providers are:  Primary Physician: Becki Avery with [facility] in [place]      Psychiatrist/Medication Management:  Name/Organization: Reports he was seeing a provider at [facility] but wants to find a new provider with whom he feels a better connection with. Per [facility], he has been discharged due to not complying with recommended VAL's     :  Name/Organization: Jie David  [facility] Apartment: Reports he moved into his apartment in August of this year, location is [place].  Name/Organization: [facility], owner Fatou Martinez       [facility]:  Name/Organization: [facility] -  Rescheduled [facility] appt for Wednesday, 1/22/25 @0900 with Zohra valencia.        CADI Worker:  Name/Organization: Silvia Ham  [facility]  Phone:       Art Therapy: [facility]   Fannie next apt 1/4/25 @11Am     Individual Therapy: [facility]  Scheduled individual therapy new intake appt for Tuesday, 1/7/25 @1400 with Kwame corbin.       Barriers to Discharge   Ongoing stabilization      Referral Status  TBD     Legal Status  Court hold. PD revoked 12/23    Next Steps:     Prakash would like a new psych provider- Per patient, his  has recommendations for psychiatry. I made two attempts to request additional information. TBD. I called [facility] on behalf of pt to request a new provider . I was informed that Prakash has been discharged from [facility] completely due  to not complying signing the required release of information needed to provide care . To do: either schedule a psych apt with new provider at a new clinic if CM does not confirm. Follow up with CM    Coverage, please refer to my note on 12/30 regarding problem solving plans for josie's dog.

## 2024-12-30 NOTE — PROGRESS NOTES
"Individual Therapy Note      Date of Service: December 30, 2024    Patient: Prakash goes by \"Prakash,\" uses he/him pronouns    Individuals Present: Prakash & NATHAN Mendez    Session start: 1:30 pm  Session end: 2:00 pm  Session duration in minutes: 30      Modality Used: Person Centered and Motivational Interviewing    Goals: process feelings  of anxiety on the unit and share coping and grounding skills    Patient Description of current symptoms: very anxious     Mental Status Exam:   Attitude: cooperative and guarded  Eye Contact: fair  Mood: anxious  Affect: intensity is exaggerated  Speech: paucity of speech  Psychomotor Behavior: no evidence of tardive dyskinesia, dystonia, or tics  Thought Process:  linear  Associations: no loose associations  Thought Content: passive suicidal ideation present  Insight: fair  Judgement: limited  Attention Span and Concentration: fair    Pt progress: Pt is improving, staying safe on the unit, no self harm since last week    Treatment Objective(s) Addressed:   The focus of this session was on building distress tolerance and anger management     Progress Towards Goals and Assessment of Patient:   Patient is making progress towards treatment goals as evidenced by willingness to engage in individual therapy and group therapy.       Therapeutic Intervention(s):   Provided active listening, unconditional positive regard, and validation.   Explored and identified early warning signs and triggers to SI and SIB and Explored motivation for behavioral change      Plan/next step: continue to meet regularly to process feelings    95154 - Psychotherapy (with patient) - 30 (16-37*) min    Patient Active Problem List   Diagnosis    ADHD (attention deficit hyperactivity disorder)    Marijuana abuse    Polysubstance abuse (H)    GERD (gastroesophageal reflux disease)    Tobacco abuse    Intractable back pain    Optic neuritis    Cauda equina syndrome with neurogenic bladder (H)    " Schizoaffective disorder, bipolar type (H)    PTSD (post-traumatic stress disorder)    Anxiety    Auditory hallucination    Class 2 obesity due to excess calories without serious comorbidity with body mass index (BMI) of 36.0 to 36.9 in adult    Nephrolithiasis    Cannabis use disorder, severe, dependence (H)    Depression    History of heroin abuse (H)    Opioid use disorder, severe, dependence (H)    Substance-induced psychotic disorder with hallucinations (H)    Nausea    Urinary retention    Chronic bilateral low back pain without sciatica    AVA (generalized anxiety disorder)    Lumbosacral radiculopathy at L5    Abnormal uterine bleeding    Bipolar affective disorder, mixed, severe, with psychotic behavior (H)    Akathisia    Hypertension, unspecified type    Female-to-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 person    Morbid obesity (H)    Mood disorder due to a general medical condition    Acne vulgaris    Type 2 diabetes mellitus with hyperglycemia, with long-term current use of insulin (H)    Herpes labialis    Major depressive disorder, 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

## 2024-12-30 NOTE — PLAN OF CARE
"  Problem: Nonsuicidal Self-Injury  Goal: Improved Behavior Regulation and Impulse Control (Nonsuicidal Self-Injury)  Outcome: Progressing  Goal: Improved Mood Symptoms (Nonsuicidal Self-Injury)  Intervention: Optimize Emotion and Mood  Recent Flowsheet Documentation  Taken 12/30/2024 1700 by Gustavo Marte RN  Diversional Activity:   reading   puzzles  Goal: Enhanced Social, Occupational or Functional Skills (Nonsuicidal Self-Injury)  Intervention: Promote Social, Occupational and Functional Ability  Recent Flowsheet Documentation  Taken 12/30/2024 1700 by Gustavo Marte RN  Trust Relationship/Rapport:   care explained   choices provided   reassurance provided   thoughts/feelings acknowledged   emotional support provided   questions encouraged   Goal Outcome Evaluation:    Plan of Care Reviewed With: patient      Pt in milieu most of the shift. Was observed interacting with select peers and staff. Endorsed pain. PRN one time Oxycodone given with partial effect.     Pt is not pleased that Oxycodone is just a one time dose. He's fixated, frustrated and angry that he won't be able to sleep due to lack of Oxycodone every 4 hours because that's what he was told he will be getting. Writer advised Pt to discuss this with his Provider in the morning.     At one point, Pt went to his room and slammed the door. Pt initially refused to take his HS meds, stating \"what's the point?\" writer had to plead and encouraged him to take his meds.     PRN Hydroxyzine, Ativan, Tylenol, Flexeril, and Nicotine gum X 5.    Read and journaled in the dinning area. Listened to music with the headphone while walking in the hallway.  BG prior to dinner was 163.   /78 (BP Location: Left arm)   Pulse 89   Temp 97.5  F (36.4  C) (Temporal)   Resp 16   Wt 103.7 kg (228 lb 9.6 oz)   SpO2 97%   BMI 36.34 kg/m          "

## 2024-12-30 NOTE — PROGRESS NOTES
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    Collection Time: 12/22/24  5:40 PM   Result Value Ref Range    GLUCOSE BY METER POCT 152 (H) 70 - 99 mg/dL   Glucose by meter    Collection Time: 12/23/24  7:42 AM   Result Value Ref Range    GLUCOSE BY METER POCT 132 (H) 70 - 99 mg/dL   Glucose by meter    Collection Time: 12/23/24  5:34 PM   Result Value Ref Range    GLUCOSE BY METER POCT 99 70 - 99 mg/dL   Glucose by meter    Collection Time: 12/23/24 11:56 PM   Result Value Ref Range    GLUCOSE BY METER POCT 155 (H) 70 - 99 mg/dL   Glucose by meter    Collection Time: 12/24/24  7:35 AM   Result Value Ref Range    GLUCOSE BY METER POCT 153 (H) 70 - 99 mg/dL   Valproic acid    Collection Time: 12/24/24  8:09 AM   Result Value Ref Range    Valproic acid 46.0 (L)   ug/mL   Glucose by meter    Collection Time: 12/24/24  5:53 PM   Result Value Ref Range    GLUCOSE BY METER POCT 144 (H) 70 - 99 mg/dL   Glucose by meter    Collection Time: 12/25/24  7:42 AM   Result Value Ref Range    GLUCOSE BY METER POCT 198 (H) 70 - 99 mg/dL   Glucose by meter    Collection Time: 12/25/24 11:38 AM   Result Value Ref Range    GLUCOSE BY METER POCT 115 (H) 70 - 99 mg/dL   Glucose  by meter    Collection Time: 12/25/24  5:33 PM   Result Value Ref Range    GLUCOSE BY METER POCT 167 (H) 70 - 99 mg/dL   Glucose by meter    Collection Time: 12/26/24 12:05 PM   Result Value Ref Range    GLUCOSE BY METER POCT 114 (H) 70 - 99 mg/dL   Glucose by meter    Collection Time: 12/26/24  5:49 PM   Result Value Ref Range    GLUCOSE BY METER POCT 199 (H) 70 - 99 mg/dL   Glucose by meter    Collection Time: 12/27/24  7:53 AM   Result Value Ref Range    GLUCOSE BY METER POCT 200 (H) 70 - 99 mg/dL   Glucose by meter    Collection Time: 12/27/24  8:16 PM   Result Value Ref Range    GLUCOSE BY METER POCT 206 (H) 70 - 99 mg/dL   Glucose by meter    Collection Time: 12/28/24  7:43 AM   Result Value Ref Range    GLUCOSE BY METER POCT 195 (H) 70 - 99 mg/dL   Glucose by meter    Collection Time: 12/28/24  5:27 PM   Result Value Ref Range    GLUCOSE BY METER POCT 117 (H) 70 - 99 mg/dL   Valproic acid    Collection Time: 12/29/24  7:15 AM   Result Value Ref Range    Valproic acid 47.8 (L)   ug/mL   CK total    Collection Time: 12/29/24  7:15 AM   Result Value Ref Range     26 - 308 U/L   Glucose by meter    Collection Time: 12/29/24  7:29 AM   Result Value Ref Range    GLUCOSE BY METER POCT 220 (H) 70 - 99 mg/dL   Glucose by meter    Collection Time: 12/29/24  5:31 PM   Result Value Ref Range    GLUCOSE BY METER POCT 135 (H) 70 - 99 mg/dL   Glucose by meter    Collection Time: 12/30/24  7:49 AM   Result Value Ref Range    GLUCOSE BY METER POCT 216 (H) 70 - 99 mg/dL       Attestation:  Patient has been seen and evaluated by me, Ana Pa APRN, CNP.  I spent >50 min on the date of the encounter in chart review, patient visit, review of tests, documentation, care coordination, and/or discussion with other providers about the issues documented above.

## 2024-12-30 NOTE — PLAN OF CARE

## 2024-12-30 NOTE — PLAN OF CARE
Pt has left the unit at this time for MRI scheduled at midnight. Security and SIO staff escorting via w/c.  Ativan was given 30 minutes prior to procedure as recommended.

## 2024-12-30 NOTE — PROGRESS NOTES
[facility] Nurse Inpatient Assessment     Consulted for: Left hand    Summary: Self inflicted bite.    Patient History (according to provider note(s):      Per Sydney Subramanian on 12/21/2024: Internal medicine contacted by primary team regarding right foot pain and self inflicted bite wound.  Pt has repeatedly engaged in self injurious behavior by kicking doors.  Patient unable to contract for safety and with escalating behaviors as well.  [facility] team contacted today and present in pt's room on arrival.  On exam, dorsal aspect of R foot with trace swelling and small bruising, no erythema.  Slight tenderness to touch.  Able to bear weight but painful.  Left arm with half dollar-sized round bite wound on inside of left wrist with serous drainage, bruising, and erythema.  Pt reports that she did break the skin and it was bleeding the day before.  No fevers or chills.      Assessment:      Areas visualized during today's visit: Hands    Wound location: Left hand     12/23 Left hand                                        12/23 Left forearm      12/30 Left forearm    Last photo: 12/30/2024  Wound due to: Trauma  Wound history/plan of care: self inflicted  Wound base:  100% Serous scabbing     Palpation of the wound bed: normal      Drainage: none     Description of drainage: none     Measurements (length x width x depth, in cm): Left hand is healed. Left lower arm almost resurfaced.      Tunneling: N/A     Undermining: N/A  Periwound skin: Intact      Color: pink      Temperature: normal   Odor: none  Pain: denies   Pain interventions prior to dressing change: slow and gentle cares   Treatment goal: Heal   STATUS: healing  Supplies ordered: gathered    Patient care order for suture removal of wounds on left forearm. Sutures removed on [facility] visit 12/23.        Treatment Plan:     Left hand and arm wound(s): Daily : Wash wounds with wound cleanser and gauze. Apply a thin layer of  "Aquaphor to open areas. Cover as needed with Mepilex or gauze and Tegaderm (Tegarderm may be a better to keep patient from biting again, OK to use either dressing). Patient does not need to cover wounds when showering.  OK to discontinue dressings when healed.     Orders: Reviewed    RECOMMEND PRIMARY TEAM ORDER: None, at this time  Education provided: plan of care  Discussed plan of care with: Patient and Nurse  [facility] nurse follow-up plan: signing off  Notify WO if wound(s) deteriorate.  Nursing to notify the Provider(s) and re-consult the [facility] Nurse if new skin concern.    DATA:     Current support surface: Standard  Foam mattress (Behavioral unit)  Containment of urine/stool: Continent of bladder and Continent of bowel  BMI: Body mass index is 36.34 kg/m .   Active diet order: Orders Placed This Encounter      Regular Diet Adult     Output: No intake/output data recorded.     Labs: No lab results found in last 7 days.    Invalid input(s): \"GLUCOMBO\"  Pressure injury risk assessment:   Sensory Perception: 4-->no impairment  Moisture: 4-->rarely moist  Activity: 3-->walks occasionally  Mobility: 4-->no limitation  Nutrition: 3-->adequate  Friction and Shear: 3-->no apparent problem  Moiz Score: 21    Narcisa Headley RN CWOCN  Pager no longer is use, please contact through [employer] group: [facility]  Dept. Office Number: [phone]    "

## 2024-12-30 NOTE — PROGRESS NOTES
Paged by RN re back and leg pain. Patient has received tylenol, ibuprofen, flexeril, hydroxyzine, lidocaine, ativan, gabapentin without improvement. Wanted to trial Toradol which was ordered with no relief. Went to see patient and he is clearly very uncomfortable. Reports chronic low back pain with radiation down left leg, but since this morning patient is radiating down both legs, is 10/10 severity and states it is the worst pain since that which prompted his spinal surgery in 2016 for cauda equina. Patient denies saddle anesthesia, new bowel or bladder incontinence. Exam was fairly reassuring though somewhat weak 4/5 strength plantar and dorsi flexion of both ankles which patient describes as worse than baseline. I noted MRI from just two months ago that shows L5-S1 disease without chord compression but given progression of symptoms with bilateral leg involvement and some distal weakness will proceed with repeating the MRI. We are running out of treatment options. Discussed steroids which patient reports worsens SI and robaxin which patient reports does nothing. Thus ok for one time dose of oxycodone while awaiting workup. I note that patient is tapering off of gabapentin which he is in favor of as well, though this may be causing some rebound pain and overall this is a difficult situation in which pain management could be involved if it continues to this degree. Pt will be npo until mri completes. This was all discussed with the patient as well as the bedside rn who touched base with psych who gave ok for one time dose of opioids.

## 2024-12-30 NOTE — PLAN OF CARE
"Pt returned to the unit from MRI at 1:20 am with SIO staff and .  He is observed walking often to the lounge and dining area when not resting in bed.    He asked \"Can I eat something?  Do I still have to be fasting?\".  Writer informed Pt that per PA medicine note Pt is only to remain NPO until MRI is completed.  Pt requested a PRN medication for pain and a snack.  When writer provided options for pain management Pt asked \"What about the Leigha?\"  Writer informed that it was only a one-time order.  Pt replied \"What am I suppose to do for pain?\"   PRN Tylenol and Flexeril was given at 1: 35 am.   Pt remains awake coloring in his room then [facility] to [facility] more snacks and cereal.    At 2:50 am Pt approached writer requesting PRN medication for pain.  Pt asked SIO staff if the unit could call the medical doctor to order pain killers.  Staff reviewed that Pt had been given IM Toradol, Oxycodone, Gabapentin, Flexeril, Tylenol and Ativan x2 tonight.  When encouraged to get rest Pt replied \"How am I suppose to sleep with all that loud shouting\".  He stated that it was making his pain worse.   Pt is observed quietly sleeping on his mattress at approximately 3:20 am.  SIO remains at bedside for safety.  No SIB reported.  He had approximately 2.5 hours of sleep tonight but remains resting in bed at this time.   "

## 2024-12-30 NOTE — PLAN OF CARE
12/30/24 1209   Interprofessional Rounds   Summary He remains on SIO.  He reports minimal improvement in auditory hallucinations but states he is coping better and depression and anxiety are improving.  SI and SIB urges are reduced.   Participants advanced practice nurse;nursing;CTC   Behavioral Team Discussion   Participants Ana Pa,SUSANNAH Ro LGSW and Luz Pimentel RN   Progress requires onging stabilzatoin   Anticipated length of stay 1-4 days   Continued Stay Criteria/Rationale ongoing stabilization   Medical/Physical See H&P   Precautions see below   Plan Discharge home with outpatient support   Anticipated Discharge Disposition other (see comments)     PRECAUTIONS AND SAFETY    Behavioral Orders   Procedures    Code 1 - Restrict to Unit    Code 2    Routine Programming     As clinically indicated    Self Injury Precaution    Status 15     Every 15 minutes.    Suicide precautions: Suicide Risk: HIGH; Clinical rationale to override score: modification to the care environment     Search patient belongings per policy for contraband or items that could be used for self-harm.   Send personal items home or secure valuables according to Patient Belongings policy.  Secure visitor's belongings  Assess safety of clothing - Ask patient to change into gown/scrubs. Consider allowing to keep undergarments after search.  Direct visualization when patient in bathroom.     Order Specific Question:   Suicide Risk     Answer:   HIGH     Order Specific Question:   Clinical rationale to override score:     Answer:   modification to the care environment       Safety  Safety WDL: WDL  Patient Location: lounge, dining room  Observed Behavior: sitting, walking  Observed Behavior (Comment): see notes  Safety Measures: environmental rounds completed, self-directed behavior promoted, suicide check-in completed  Diversional Activity: reading, puzzles  Suicidality: Status 15, Optimize communication / relationship to  minimize opportunity for self-harm  Seizure precautions: clutter free environment  Assault: status continuous sight

## 2024-12-30 NOTE — PLAN OF CARE
"  Problem: Anxiety Signs/Symptoms  Goal: Improved Mood Symptoms (Anxiety Signs/Symptoms)  Outcome: Progressing     Problem: Nonsuicidal Self-Injury  Goal: Improved Mood Symptoms (Nonsuicidal Self-Injury)  Outcome: Progressing   Goal Outcome Evaluation:     Plan of Care Reviewed With: patient       Patient asked for something for pain (8/10) this morning. Pain was in lower back and left leg. He was given Ibuprofen 600 mg prn. Patient fell asleep soon after that time. When he awoke about 1 hr later, he rated pain 8/10 again but declined prns.   He denied thoughts of self-harm this morning. He said he wants to get off SIO.   Patient met with provider and was able to have SIO discontinued. Gave patient headphones to keep in his room. He came off SIO and the team agreed that he earned another privilege. He continued to complain of pain throughout the morning. Was given Tylenol see MAR. He was also given Ativan 0.5 mg for anxiety 7/10. He was later ordered a one time dose of Toradol IM. Patient continues to rate pain 9/10 despite all interventions. He does not appear in distress. Pain consult was ordered.   Patient continued to deny SI at 1130. He was reassessed at about 1230 and again denied SI. Medical flyer removed PIV this afternoon. Patient wanted me to page provider at about 1300. \"I've gotta figure out this pain. I can't stay like this.\" Patient refused Voltaren both at 0800 and 1200. Refused Flexeril and lidocaine patches. Toradol he says was not helpful. Of note, he said that Ativan was \"very helpful\" for anxiety. Provider was notified of current pain.   Patient reassured and that provider will reach out to pain again to see what time they will see patient, if it's today.  Patient came to writer at about 1450 and said that he was starting to have passive SI because of his pain. Encouraged patient to sit in the lounge area. He has been doing this. Pain service did see patient prior to this.  Update:  patient was " seen by pain service.   Provider put in a one time order for Oxycodone. It is not yet available on unit to give to patient.   PRNS given this shift (see MAR): Ibuprofen, Zofran Tylenol, nicotine gum, Ativan, Toradol

## 2024-12-31 LAB
GLUCOSE BLDC GLUCOMTR-MCNC: 131 MG/DL (ref 70–99)
GLUCOSE BLDC GLUCOMTR-MCNC: 160 MG/DL (ref 70–99)

## 2024-12-31 PROCEDURE — 250N000013 HC RX MED GY IP 250 OP 250 PS 637: Performed by: PSYCHIATRY & NEUROLOGY

## 2024-12-31 PROCEDURE — 250N000013 HC RX MED GY IP 250 OP 250 PS 637

## 2024-12-31 PROCEDURE — 124N000002 HC R&B MH UMMC

## 2024-12-31 PROCEDURE — 97150 GROUP THERAPEUTIC PROCEDURES: CPT | Mod: GO

## 2024-12-31 PROCEDURE — 250N000013 HC RX MED GY IP 250 OP 250 PS 637: Performed by: NURSE PRACTITIONER

## 2024-12-31 PROCEDURE — 90832 PSYTX W PT 30 MINUTES: CPT | Performed by: COUNSELOR

## 2024-12-31 PROCEDURE — 250N000013 HC RX MED GY IP 250 OP 250 PS 637: Performed by: REGISTERED NURSE

## 2024-12-31 PROCEDURE — A9270 NON-COVERED ITEM OR SERVICE: HCPCS | Performed by: PSYCHIATRY & NEUROLOGY

## 2024-12-31 PROCEDURE — 250N000011 HC RX IP 250 OP 636

## 2024-12-31 PROCEDURE — 250N000013 HC RX MED GY IP 250 OP 250 PS 637: Performed by: CLINICAL NURSE SPECIALIST

## 2024-12-31 RX ORDER — NALOXONE HYDROCHLORIDE 0.4 MG/ML
0.2 INJECTION, SOLUTION INTRAMUSCULAR; INTRAVENOUS; SUBCUTANEOUS
Status: ACTIVE | OUTPATIENT
Start: 2024-12-31

## 2024-12-31 RX ORDER — NALOXONE HYDROCHLORIDE 0.4 MG/ML
0.4 INJECTION, SOLUTION INTRAMUSCULAR; INTRAVENOUS; SUBCUTANEOUS
Status: ACTIVE | OUTPATIENT
Start: 2024-12-31

## 2024-12-31 RX ORDER — OXYCODONE HYDROCHLORIDE 5 MG/1
5 TABLET ORAL EVERY 6 HOURS PRN
Status: DISCONTINUED | OUTPATIENT
Start: 2024-12-31 | End: 2025-01-02

## 2024-12-31 RX ADMIN — CYCLOBENZAPRINE HYDROCHLORIDE 10 MG: 5 TABLET, FILM COATED ORAL at 19:01

## 2024-12-31 RX ADMIN — NICOTINE POLACRILEX 4 MG: 4 GUM, CHEWING BUCCAL at 11:41

## 2024-12-31 RX ADMIN — CYCLOBENZAPRINE HYDROCHLORIDE 10 MG: 5 TABLET, FILM COATED ORAL at 13:17

## 2024-12-31 RX ADMIN — HYDROXYZINE HYDROCHLORIDE 75 MG: 25 TABLET ORAL at 22:02

## 2024-12-31 RX ADMIN — NICOTINE 1 PATCH: 21 PATCH, EXTENDED RELEASE TRANSDERMAL at 08:14

## 2024-12-31 RX ADMIN — GABAPENTIN 600 MG: 600 TABLET, FILM COATED ORAL at 22:03

## 2024-12-31 RX ADMIN — SERTRALINE HYDROCHLORIDE 50 MG: 50 TABLET ORAL at 08:14

## 2024-12-31 RX ADMIN — Medication 2 G: at 22:09

## 2024-12-31 RX ADMIN — IBUPROFEN 600 MG: 200 TABLET, FILM COATED ORAL at 04:02

## 2024-12-31 RX ADMIN — OXYCODONE 5 MG: 5 TABLET ORAL at 16:09

## 2024-12-31 RX ADMIN — TESTOSTERONE 100 MG OF TESTOSTERONE: 50 GEL TRANSDERMAL at 11:55

## 2024-12-31 RX ADMIN — LORAZEPAM 0.5 MG: 0.5 TABLET ORAL at 05:46

## 2024-12-31 RX ADMIN — AMLODIPINE BESYLATE 5 MG: 5 TABLET ORAL at 08:14

## 2024-12-31 RX ADMIN — NICOTINE POLACRILEX 4 MG: 4 GUM, CHEWING BUCCAL at 07:36

## 2024-12-31 RX ADMIN — EMPAGLIFLOZIN 10 MG: 10 TABLET, FILM COATED ORAL at 08:14

## 2024-12-31 RX ADMIN — NICOTINE POLACRILEX 4 MG: 4 GUM, CHEWING BUCCAL at 21:07

## 2024-12-31 RX ADMIN — NICOTINE POLACRILEX 4 MG: 4 GUM, CHEWING BUCCAL at 22:08

## 2024-12-31 RX ADMIN — LORAZEPAM 0.5 MG: 0.5 TABLET ORAL at 19:02

## 2024-12-31 RX ADMIN — VALPROIC ACID 500 MG: 250 SOLUTION ORAL at 16:09

## 2024-12-31 RX ADMIN — POLYETHYLENE GLYCOL 3350 17 G: 17 POWDER, FOR SOLUTION ORAL at 09:20

## 2024-12-31 RX ADMIN — OMEPRAZOLE 20 MG: 20 CAPSULE, DELAYED RELEASE ORAL at 08:14

## 2024-12-31 RX ADMIN — NICOTINE POLACRILEX 4 MG: 4 GUM, CHEWING BUCCAL at 16:42

## 2024-12-31 RX ADMIN — IBUPROFEN 600 MG: 200 TABLET, FILM COATED ORAL at 22:05

## 2024-12-31 RX ADMIN — Medication 2 G: at 09:22

## 2024-12-31 RX ADMIN — ACETAMINOPHEN 650 MG: 325 TABLET, FILM COATED ORAL at 19:02

## 2024-12-31 RX ADMIN — ONDANSETRON 4 MG: 4 TABLET, ORALLY DISINTEGRATING ORAL at 10:38

## 2024-12-31 RX ADMIN — ARIPIPRAZOLE 20 MG: 20 TABLET ORAL at 08:14

## 2024-12-31 RX ADMIN — NICOTINE POLACRILEX 4 MG: 4 GUM, CHEWING BUCCAL at 12:41

## 2024-12-31 RX ADMIN — ROSUVASTATIN 40 MG: 20 TABLET, FILM COATED ORAL at 08:14

## 2024-12-31 RX ADMIN — BENZOYL PEROXIDE: 50 GEL TOPICAL at 09:21

## 2024-12-31 RX ADMIN — Medication 2 G: at 13:12

## 2024-12-31 RX ADMIN — NICOTINE POLACRILEX 4 MG: 4 GUM, CHEWING BUCCAL at 09:59

## 2024-12-31 RX ADMIN — INSULIN GLARGINE 5 UNITS: 100 INJECTION, SOLUTION SUBCUTANEOUS at 08:13

## 2024-12-31 RX ADMIN — VALPROIC ACID 500 MG: 250 SOLUTION ORAL at 08:14

## 2024-12-31 RX ADMIN — GABAPENTIN 600 MG: 600 TABLET, FILM COATED ORAL at 08:14

## 2024-12-31 RX ADMIN — OXYCODONE 5 MG: 5 TABLET ORAL at 22:08

## 2024-12-31 RX ADMIN — DOCUSATE SODIUM 100 MG: 100 CAPSULE, LIQUID FILLED ORAL at 22:05

## 2024-12-31 RX ADMIN — VALPROIC ACID 500 MG: 250 SOLUTION ORAL at 22:02

## 2024-12-31 RX ADMIN — Medication 2 G: at 16:12

## 2024-12-31 RX ADMIN — ONDANSETRON 4 MG: 4 TABLET, ORALLY DISINTEGRATING ORAL at 22:20

## 2024-12-31 RX ADMIN — ACETAMINOPHEN 650 MG: 325 TABLET, FILM COATED ORAL at 11:41

## 2024-12-31 RX ADMIN — LORAZEPAM 0.5 MG: 0.5 TABLET ORAL at 13:12

## 2024-12-31 RX ADMIN — DOCUSATE SODIUM 100 MG: 100 CAPSULE, LIQUID FILLED ORAL at 08:14

## 2024-12-31 RX ADMIN — NICOTINE POLACRILEX 4 MG: 4 GUM, CHEWING BUCCAL at 18:36

## 2024-12-31 RX ADMIN — VALACYCLOVIR 500 MG: 500 TABLET, FILM COATED ORAL at 08:14

## 2024-12-31 RX ADMIN — ACETAMINOPHEN 650 MG: 325 TABLET, FILM COATED ORAL at 04:02

## 2024-12-31 RX ADMIN — CYCLOBENZAPRINE HYDROCHLORIDE 10 MG: 5 TABLET, FILM COATED ORAL at 04:01

## 2024-12-31 RX ADMIN — CLINDAMYCIN PHOSPHATE: 10 GEL TOPICAL at 09:22

## 2024-12-31 RX ADMIN — OXYCODONE 5 MG: 5 TABLET ORAL at 09:57

## 2024-12-31 ASSESSMENT — ACTIVITIES OF DAILY LIVING (ADL)
ADLS_ACUITY_SCORE: 48
ORAL_HYGIENE: INDEPENDENT
ADLS_ACUITY_SCORE: 48
DRESS: INDEPENDENT;STREET CLOTHES
ADLS_ACUITY_SCORE: 48
HYGIENE/GROOMING: INDEPENDENT
ADLS_ACUITY_SCORE: 48
LAUNDRY: WITH SUPERVISION

## 2024-12-31 NOTE — PROGRESS NOTES
Rehab Group    Start time: 1315  End time: 1355  Patient time total: 40 minutes    attended full group    #2 attended   Group Type: self-care and relaxation   Group Topic Covered: balanced lifestyle, mindfulness, and relaxation        Group Session Detail:  [place] relaxation     Patient Response/Contribution:  cooperative with task, safe use of materials/supplies, and actively engaged       Patient Detail:    Pt independently attended topic group today and was cooperative.  Pt demonstrated a good understanding of the topic covered and fair insight into their own specific issues related to topic.  Pt was appropriately social with peers and staff. Pt's affect was flat and attention span was fair.  Pt reported that his anxiety had decreased at the end of relaxation group. Pt will continue to be encouraged to attend groups for ongoing assessment and to work toward goals identified on plan of care. Only billed for 15 min due to decreased attendance in group.      [id number] OT Group (2 or more in attendance)      Patient Active Problem List   Diagnosis    ADHD (attention deficit hyperactivity disorder)    Marijuana abuse    Polysubstance abuse (H)    GERD (gastroesophageal reflux disease)    Tobacco abuse    Intractable back pain    Optic neuritis    Cauda equina syndrome with neurogenic bladder (H)    Schizoaffective disorder, bipolar type (H)    PTSD (post-traumatic stress disorder)    Anxiety    Auditory hallucination    Class 2 obesity due to excess calories without serious comorbidity with body mass index (BMI) of 36.0 to 36.9 in adult    Nephrolithiasis    Cannabis use disorder, severe, dependence (H)    Depression    History of heroin abuse (H)    Opioid use disorder, severe, dependence (H)    Substance-induced psychotic disorder with hallucinations (H)    Nausea    Urinary retention    Chronic bilateral low back pain without sciatica    AVA (generalized anxiety disorder)    Lumbosacral radiculopathy at L5     Abnormal uterine bleeding    Bipolar affective disorder, mixed, severe, with psychotic behavior (H)    Akathisia    Hypertension, unspecified type    Female-to-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 person    Morbid obesity (H)    Mood disorder due to a general medical condition    Acne vulgaris    Type 2 diabetes mellitus with hyperglycemia, with long-term current use of insulin (H)    Herpes labialis    Major depressive disorder, 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

## 2024-12-31 NOTE — PLAN OF CARE
Pt VSS. Pt denies SI, HI, and visual hallucinations. Pt endorses baseline auditory hallucinations and reports they are not command hallucinations.     Pt endorses 9/10 sharp lower left back pain that radiates to his left foot. Writer applied Voltaren cream to area, but it was not effective per pt report. Pt administered PRN oxycodone. Following administration, pt reports pain reduced to 7/10. Pt endorsed nausea following oxycodone, Zofran administered. Other PRNs provided throughout the day with minimal relief per pt report.    Pt approached writer and said that they left group and needed ativan because his anxiety was really bad due to another patient yelling during group. Writer acknowledged pt feelings and told pt that writer would be over to talk with them after they finished passing meds to another pt. Writer encouraged distraction as a coping tool in the meantime. Writer re approached pt and informed him that his PRN ativan was not available yet as he had already taken 3 doses in a 24 hour period. Pt reported that coloring was helping and that they didn't need the ativan right now and would take it later if needed. Writer encouraged pt to continue to work on identifying coping skills that were health promoting. Pt agreed and reported understanding. Pt was administered ativan at approximately 1300 for worsening anxiety.    Pt requested to not have any phone calls this evening and asked for writer to inform oncoming staff.     Problem: Pain Acute  Goal: Optimal Pain Control and Function  Intervention: Optimize Psychosocial Wellbeing  Recent Flowsheet Documentation  Taken 12/31/2024 1023 by Soo Diaz RN  Supportive Measures:   self-care encouraged   self-responsibility promoted   active listening utilized  Problem: Anxiety Signs/Symptoms  Goal: Improved Mood Symptoms (Anxiety Signs/Symptoms)  Outcome: Progressing  Intervention: Optimize Emotion and Mood  Recent Flowsheet Documentation  Taken 12/31/2024  1023 by Soo Diaz, RN  Supportive Measures:   self-care encouraged   self-responsibility promoted   active listening utilized

## 2024-12-31 NOTE — PROGRESS NOTES
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 POCT 115 (H) 70 - 99 mg/dL   Glucose by meter    Collection Time: 12/25/24  5:33 PM   Result Value Ref Range    GLUCOSE BY METER POCT 167 (H) 70 - 99 mg/dL   Glucose by meter    Collection Time: 12/26/24 12:05 PM   Result Value Ref Range    GLUCOSE BY METER POCT 114 (H) 70 - 99 mg/dL   Glucose by meter    Collection Time: 12/26/24  5:49 PM   Result Value Ref Range    GLUCOSE BY METER POCT 199 (H) 70 - 99 mg/dL   Glucose by meter    Collection Time: 12/27/24  7:53 AM   Result Value Ref Range    GLUCOSE BY METER POCT 200 (H) 70 - 99 mg/dL   Glucose by meter    Collection Time: 12/27/24  8:16 PM   Result Value Ref Range    GLUCOSE BY METER POCT 206 (H) 70 - 99 mg/dL   Glucose by meter    Collection Time: 12/28/24  7:43 AM   Result Value Ref Range    GLUCOSE BY METER POCT 195 (H) 70 - 99 mg/dL   Glucose by meter    Collection Time: 12/28/24  5:27 PM   Result Value Ref Range    GLUCOSE BY METER POCT 117 (H) 70 - 99 mg/dL   Valproic acid    Collection Time: 12/29/24  7:15 AM   Result Value Ref Range    Valproic acid 47.8 (L)   ug/mL   CK total    Collection Time: 12/29/24  7:15 AM   Result Value Ref Range      26 - 308 U/L   Glucose by meter    Collection Time: 12/29/24  7:29 AM   Result Value Ref Range    GLUCOSE BY METER POCT 220 (H) 70 - 99 mg/dL   Glucose by meter    Collection Time: 12/29/24  5:31 PM   Result Value Ref Range    GLUCOSE BY METER POCT 135 (H) 70 - 99 mg/dL   Glucose by meter    Collection Time: 12/30/24  7:49 AM   Result Value Ref Range    GLUCOSE BY METER POCT 216 (H) 70 - 99 mg/dL   Glucose by meter    Collection Time: 12/30/24  5:08 PM   Result Value Ref Range    GLUCOSE BY METER POCT 163 (H) 70 - 99 mg/dL       Attestation:  Patient has been seen and evaluated by me, Ana Pa APRN, CNP.  I spent >50 min on the date of the encounter in chart review, patient visit, review of tests, documentation, care coordination, and/or discussion with other providers about the issues documented above.

## 2024-12-31 NOTE — PROGRESS NOTES
Rehab Group    Start time: 1015  End time: 1130  Patient time total: 60 minutes    attended partial group    #4 attended   Group Type: occupational therapy and OT Clinic   Group Topic Covered: activity therapy, coping skills, and problem solving       Group Session Detail:  OT Clinic     Patient Response/Contribution:  cooperative with task, organized, safe use of materials/supplies, and actively engaged       Patient Detail:    Pt actively participated in occupational therapy clinic to facilitate coping skill exploration, creative expression within personally meaningful activities, and clinical observation of social, cognitive, and kinesthetic performance skills. Pt response: Independent to initiate, gather materials, sequence, and adjust to workspace demands as needed. Demonstrated good focus, planning, and problem solving for selected coloring  task. Able to ask for assistance as needed, and appropriately social with peers and staff.  Pt will continue to be encouraged to attend groups for further asssesssment and to address goals identified on plan of care.       [id number] OT Group (2 or more in attendance)      Patient Active Problem List   Diagnosis    ADHD (attention deficit hyperactivity disorder)    Marijuana abuse    Polysubstance abuse (H)    GERD (gastroesophageal reflux disease)    Tobacco abuse    Intractable back pain    Optic neuritis    Cauda equina syndrome with neurogenic bladder (H)    Schizoaffective disorder, bipolar type (H)    PTSD (post-traumatic stress disorder)    Anxiety    Auditory hallucination    Class 2 obesity due to excess calories without serious comorbidity with body mass index (BMI) of 36.0 to 36.9 in adult    Nephrolithiasis    Cannabis use disorder, severe, dependence (H)    Depression    History of heroin abuse (H)    Opioid use disorder, severe, dependence (H)    Substance-induced psychotic disorder with hallucinations (H)    Nausea    Urinary retention    Chronic bilateral low  back pain without sciatica    AVA (generalized anxiety disorder)    Lumbosacral radiculopathy at L5    Abnormal uterine bleeding    Bipolar affective disorder, mixed, severe, with psychotic behavior (H)    Akathisia    Hypertension, unspecified type    Female-to-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 person    Morbid obesity (H)    Mood disorder due to a general medical condition    Acne vulgaris    Type 2 diabetes mellitus with hyperglycemia, with long-term current use of insulin (H)    Herpes labialis    Major depressive disorder, 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

## 2024-12-31 NOTE — PLAN OF CARE
Problem: Pain Acute  Goal: Optimal Pain Control and Function  Outcome: Progressing   Goal Outcome Evaluation:    Plan of Care Reviewed With: patient      Pt is visible in the milieu interacting and socializing with select peers. Drawing, coloring and walking in the hallway. Endorsed base line auditory Hallucination. Endorsed anxiety, and generalized pain. Denies depression, SI/HI/AVH. PRN Oxycodone X 2, Ativan, Tylenol, Flexeril, Ibuprofen, Zofran, Nicotine gum X 4  BG prior to dinner 160  Refused Vitals

## 2024-12-31 NOTE — PLAN OF CARE

## 2024-12-31 NOTE — PLAN OF CARE
"Pt slept approximately 5.25 hours tonight.  Pt approached the desk several times tonight to request pain medications.  He is no longer on SIO.  No SIB reported.    Awake at approximately 4 am when a female peer escalated in the hallway nearby.  Pt approached the desk to ask if he has oxycodone available and staff reminded him that it was a one time order.  He had Ibuprofen, Tylenol and Flexeril available.  Pt stated \"I'll take everything then.\"    Pt approached the desk again to request pain medications at 5:30 but had nothing available.  Appears that his pain is also triggered by the agitated behaviors around him.  He took PRN Ativan at 5:45 am and went to rest in his room.  Pt approached the desk at 6:35 am asking \"When will Ana (NP) be here?\"  \"I need to talk with her about my pain medications\"    "

## 2024-12-31 NOTE — PLAN OF CARE
Goal Outcome Evaluation:    Initial meeting note:    Therapist introduced self to patient and discussed psychotherapy service available to patient.     Pt response: Pt expressed interest in meeting with therapist    Plan: Made plan to meet with pt again; began identifying goals     Writer and pt will meet on Wednesday to crate art based on trauma containment and letting go of past hurts.

## 2024-12-31 NOTE — CONSULTS
Pain Service Consultation Note  [facility]      Patient Name: Prakash Prasad  MRN: [id number]   Age: 34 year old  Sex: adult  Date: December 31, 2024                                      Reviewed: Yes    Referring Provider:  Ana Pa  Referring Service:  Psychiatry  Reason for Consultation: Radicular Back Pain    Assessment/Recommendations:  34 year old adult with a h/o hypertension, NIDDM and hypercholesterolemia who had lumbar spine surgery in 2016, now complains of low back pain and radicular symptoms on the left.  He states that the pain is 9/10 in severity.  The pain is similar to the pain he experienced prior to his surgery in 2016.  At that time he tried medications and spinal injections.  The pain started yesterday.  He has had several different medications for the pain, to include, voltaren,Flexeril, toradol and gabapentin.  He states that these have not helped.  He states the only thing that helps is oxycodone. Seen by medicaine.  MRI performed    Plan:   Oxycodone 5 mg q4-6h prn  Physical Therapy  Outpatient epidural (transforaminal) injections, if the patient agrees  Patient states he is not willing to take gabapentin    Thank you for the opportunity to participate in the care of Prakash Prasad  Pain Service will sign off.      Primary Service Contacted with Recommendations? Yes    Miguel Angel Cruz MD  12/31/2024      Chief Complaint:  Low Back Pain with radiation to the left leg      History of Present Illness:  34 year old adult with a h/o hypertension, NIDDM and hypercholesterolemia who had lumbar spine surgery in 2016, now complains of low back pain and radicular symptoms on the left.  He states that the pain is 9/10 in severity.  The pain is similar to the pain he experienced prior to his surgery in 2016.  At that time he tried medications and spinal injections.  The pain started yesterday.  He has had several different medications for the pain, to  include, voltaren,Flexeril, toradol and gabapentin.  He states that these have not helped.  He states the only thing that helps is oxycodone.     Pain Assessment:   Description of Pain: Aching and radiating  Location: low back and left leg  Current Pain: 9/10  Goal: 3/10  Baseline Pain Level: 0/10  Onset:yesterday   Relieving/exacerbating factors: opioids   Radiating: yesto the left leg   Localized: no  Constant: yes  Intermittent: no      Past Medical History:  Past Medical History:   Diagnosis Date    ADHD (attention deficit hyperactivity disorder)     Bipolar 1 disorder     Borderline personality disorder     Cauda equina syndrome     Chronic low back pain     Depression     Diabetes mellitus, type 2 (H) 1/19/2023    GERD (gastroesophageal reflux disease)     h/o TBI (traumatic brain injury)     Hypertension, unspecified type 12/16/2021    Marginal corneal ulcer, left 07/17/2015    Nephrolithiasis     obesity     Polysubstance abuse - methamphetamine, hallucinagen, heroin, marijuana     currently in remission    PONV (postoperative nausea and vomiting)     PTSD (post-traumatic stress disorder)          Family History:    Family History   Problem Relation Age of Onset    Hyperlipidemia Mother     Mental Illness Mother     Anxiety Disorder Mother     Anesthesia Reaction Mother         Vomiting/Nausea    Other Cancer Mother     Skin Cancer Mother     Gastrointestinal Disease Father         Crohn's Disease    Cancer Father         Liver Cancer    Other Cancer Father         Liver    Obesity Father     Skin Cancer Father     No Known Problems Sister     Hypertension Brother     Other Cancer Brother         Esophagecial    Diabetes Brother     Obesity Brother     Hypertension Brother     Other Cancer Brother     Cancer Maternal Grandmother         lympoma    Diabetes Maternal Grandmother     Mental Illness Maternal Grandmother     Other Cancer Maternal Grandmother         Non Hodgkins Lymphoma    Diabetes Maternal  Grandfather     Hypertension Maternal Grandfather     Prostate Cancer Maternal Grandfather     Hyperlipidemia Maternal Grandfather     Heart Disease Paternal Grandfather         heart disease    Other Cancer Paternal Half-Brother        Social History:  Social History     Tobacco Use    Smoking status: Former     Current packs/day: 0.00     Average packs/day: 0.5 packs/day for 11.1 years (5.6 ttl pk-yrs)     Types: Other, Cigarettes     Start date: 2011     Quit date: 2022     Years since quittin.2     Passive exposure: Never    Smokeless tobacco: Former     Types: Chew     Quit date: 2019    Tobacco comments:     Just nicotine gum currently. 23   Substance Use Topics    Alcohol use: Yes           H/O Substance Abuse:  yes      Review of Systems:  Complete ROS reviewed. Unless otherwise noted, all other systems found to be negative.        Laboratory Results:  Recent Labs   Lab Test 12/15/24  0750 10/12/24  2056 10/12/24  2055   INR  --   --  1.01      < >  --    BUN 19.1   < > 11.1    < > = values in this interval not displayed.         Allergies:  Allergies   Allergen Reactions    Haldol [Haloperidol] Other (See Comments)     Makes patient very angry and anxious    Adhesive Tape Hives    Prednisone Other (See Comments) and Hives     Suicidal ideation    Droperidol Anxiety         Current Pain Related Medications:  Medications related to Pain Management (From now, onward)      Start     Dose/Rate Route Frequency Ordered Stop    24  lidocaine (LMX4) cream          Topical EVERY 1 HOUR PRN 24  lidocaine 1 % 0.1-1 mL         0.1-1 mL Other EVERY 1 HOUR PRN 24 21524 1200  ibuprofen (ADVIL/MOTRIN) tablet 400-600 mg         400-600 mg Oral EVERY 6 HOURS PRN 24 1009      24 1200  diclofenac (VOLTAREN) 1 % topical gel 2 g         2 g Topical 4 TIMES DAILY 24 1010      24 0800  gabapentin (NEURONTIN) tablet 600  mg         600 mg Oral 2 TIMES DAILY 12/27/24 0814      12/29/24 0800  Lidocaine (LIDOCARE) 4 % Patch 1-3 patch         1-3 patch  over 12 Hours Transdermal DAILY PRN 12/29/24 1013      12/27/24 0734  LORazepam (ATIVAN) tablet 0.5 mg         0.5 mg Oral EVERY 4 HOURS PRN 12/27/24 0734      12/24/24 1500  valproic acid (DEPAKENE) solution 500 mg         500 mg Oral 3 times per day 12/24/24 1050      12/23/24 1508  bisacodyl (DULCOLAX) suppository 10 mg         10 mg Rectal DAILY PRN 12/23/24 1509      12/23/24 1508  senna-docusate (SENOKOT-S/PERICOLACE) 8.6-50 MG per tablet 1 tablet         1 tablet Oral 2 TIMES DAILY PRN 12/23/24 1509      12/22/24 0930  docusate sodium (COLACE) capsule 100 mg         100 mg Oral 2 TIMES DAILY 12/22/24 0927      12/21/24 1422  hydrOXYzine HCl (ATARAX) tablet 25-50 mg        Note to Pharmacy: PTA Sig:Take 1-2 tablets (25-50 mg) by mouth at bedtime as needed, may repeat once (For anxiety and difficulty sleeping.).      25-50 mg Oral 3 TIMES DAILY PRN 12/21/24 1422      12/20/24 0830  polyethylene glycol (MIRALAX) Packet 17 g         17 g Oral DAILY 12/20/24 0826      12/19/24 1058  cyclobenzaprine (FLEXERIL) tablet 10 mg        Note to Pharmacy: PTA Sig:Take 1 tablet (10 mg) by mouth every 8 hours as needed for muscle spasms.      10 mg Oral 3 TIMES DAILY PRN 12/19/24 1058      12/17/24 2100  hydrOXYzine HCl (ATARAX) tablet 75 mg         75 mg Oral AT BEDTIME 12/17/24 1043      12/14/24 0948  acetaminophen (TYLENOL) tablet 650 mg         650 mg Oral EVERY 4 HOURS PRN 12/14/24 0948                Physical Exam:  Vitals: /78 (BP Location: Left arm)   Pulse 89   Temp 97.5  F (36.4  C) (Temporal)   Resp 16   Wt 103.7 kg (228 lb 9.6 oz)   SpO2 97%   BMI 36.34 kg/m      Physical Exam:     CONSTITUTIONAL/GENERAL APPEARANCE:  NAD  EYES: EOMI, sclera anicteric, PERRLA  ENT/NECK: atraumatic, lips and oral mucous membranes dry  RESPIRATORY: non-labored breathing. No cough,  "wheeze  CV: RRR, no audible rubs, gallops, or murmurs  ABDOMEN: Soft, non-tender, non-distended  MUSCULOSKELETAL/BACK/SPINE/EXTREMITIES: Moves all extremities purposefully.  No edema or obvious joint deformities   NEURO: Alert and Oriented x3. Answers questions appropriately  SKIN/VASCULAR EXAM:  No jaundice, no visible rashes or lesions      45 MINUTES SPENT BY ME on the date of service doing chart review, history, exam, documentation & further activities per the note.      Acute Inpatient Pain Service [facility]  Hours of pain coverage 24/7   Page via Amcom- Please Page the Pain Team Via Amcom: \"PAIN MANAGEMENT ACUTE INPATIENT/ [facility] [place]\"           "

## 2025-01-01 DIAGNOSIS — E78.5 HYPERLIPIDEMIA LDL GOAL <100: ICD-10-CM

## 2025-01-01 LAB
GLUCOSE BLDC GLUCOMTR-MCNC: 135 MG/DL (ref 70–99)
GLUCOSE BLDC GLUCOMTR-MCNC: 143 MG/DL (ref 70–99)
HOLD SPECIMEN: NORMAL
TROPONIN T SERPL HS-MCNC: <6 NG/L

## 2025-01-01 PROCEDURE — 250N000013 HC RX MED GY IP 250 OP 250 PS 637: Performed by: CLINICAL NURSE SPECIALIST

## 2025-01-01 PROCEDURE — A9270 NON-COVERED ITEM OR SERVICE: HCPCS | Performed by: PSYCHIATRY & NEUROLOGY

## 2025-01-01 PROCEDURE — 250N000013 HC RX MED GY IP 250 OP 250 PS 637

## 2025-01-01 PROCEDURE — 250N000013 HC RX MED GY IP 250 OP 250 PS 637: Performed by: PSYCHIATRY & NEUROLOGY

## 2025-01-01 PROCEDURE — 36415 COLL VENOUS BLD VENIPUNCTURE: CPT | Performed by: PEDIATRICS

## 2025-01-01 PROCEDURE — 250N000013 HC RX MED GY IP 250 OP 250 PS 637: Performed by: NURSE PRACTITIONER

## 2025-01-01 PROCEDURE — 250N000013 HC RX MED GY IP 250 OP 250 PS 637: Performed by: REGISTERED NURSE

## 2025-01-01 PROCEDURE — 93005 ELECTROCARDIOGRAM TRACING: CPT

## 2025-01-01 PROCEDURE — 124N000002 HC R&B MH UMMC

## 2025-01-01 PROCEDURE — 84484 ASSAY OF TROPONIN QUANT: CPT | Performed by: PEDIATRICS

## 2025-01-01 RX ADMIN — LORAZEPAM 0.5 MG: 0.5 TABLET ORAL at 19:28

## 2025-01-01 RX ADMIN — IBUPROFEN 600 MG: 200 TABLET, FILM COATED ORAL at 19:28

## 2025-01-01 RX ADMIN — INSULIN GLARGINE 5 UNITS: 100 INJECTION, SOLUTION SUBCUTANEOUS at 08:25

## 2025-01-01 RX ADMIN — NICOTINE POLACRILEX 4 MG: 4 GUM, CHEWING BUCCAL at 21:10

## 2025-01-01 RX ADMIN — Medication 2 G: at 11:46

## 2025-01-01 RX ADMIN — VALPROIC ACID 500 MG: 250 SOLUTION ORAL at 08:23

## 2025-01-01 RX ADMIN — VALACYCLOVIR 500 MG: 500 TABLET, FILM COATED ORAL at 08:24

## 2025-01-01 RX ADMIN — ALUMINUM HYDROXIDE, MAGNESIUM HYDROXIDE, AND SIMETHICONE 30 ML: 200; 200; 20 SUSPENSION ORAL at 08:24

## 2025-01-01 RX ADMIN — NICOTINE POLACRILEX 4 MG: 4 GUM, CHEWING BUCCAL at 13:08

## 2025-01-01 RX ADMIN — OXYCODONE 5 MG: 5 TABLET ORAL at 14:46

## 2025-01-01 RX ADMIN — OXYCODONE 5 MG: 5 TABLET ORAL at 21:09

## 2025-01-01 RX ADMIN — Medication 2 G: at 21:10

## 2025-01-01 RX ADMIN — VALPROIC ACID 500 MG: 250 SOLUTION ORAL at 14:46

## 2025-01-01 RX ADMIN — Medication 2 G: at 18:23

## 2025-01-01 RX ADMIN — AMLODIPINE BESYLATE 5 MG: 5 TABLET ORAL at 08:24

## 2025-01-01 RX ADMIN — HYDROXYZINE HYDROCHLORIDE 75 MG: 25 TABLET ORAL at 21:08

## 2025-01-01 RX ADMIN — TESTOSTERONE 100 MG OF TESTOSTERONE: 50 GEL TRANSDERMAL at 11:40

## 2025-01-01 RX ADMIN — CYCLOBENZAPRINE HYDROCHLORIDE 10 MG: 5 TABLET, FILM COATED ORAL at 16:50

## 2025-01-01 RX ADMIN — CLINDAMYCIN PHOSPHATE: 10 GEL TOPICAL at 08:24

## 2025-01-01 RX ADMIN — ACETAMINOPHEN 650 MG: 325 TABLET, FILM COATED ORAL at 16:49

## 2025-01-01 RX ADMIN — ACETAMINOPHEN 650 MG: 325 TABLET, FILM COATED ORAL at 11:40

## 2025-01-01 RX ADMIN — OXYCODONE 5 MG: 5 TABLET ORAL at 08:38

## 2025-01-01 RX ADMIN — IBUPROFEN 600 MG: 200 TABLET, FILM COATED ORAL at 13:24

## 2025-01-01 RX ADMIN — VALPROIC ACID 500 MG: 250 SOLUTION ORAL at 21:09

## 2025-01-01 RX ADMIN — OMEPRAZOLE 20 MG: 20 CAPSULE, DELAYED RELEASE ORAL at 08:26

## 2025-01-01 RX ADMIN — POLYETHYLENE GLYCOL 3350 17 G: 17 POWDER, FOR SOLUTION ORAL at 08:26

## 2025-01-01 RX ADMIN — ROSUVASTATIN 40 MG: 20 TABLET, FILM COATED ORAL at 08:24

## 2025-01-01 RX ADMIN — NICOTINE POLACRILEX 4 MG: 4 GUM, CHEWING BUCCAL at 11:40

## 2025-01-01 RX ADMIN — NICOTINE POLACRILEX 4 MG: 4 GUM, CHEWING BUCCAL at 22:16

## 2025-01-01 RX ADMIN — EMPAGLIFLOZIN 10 MG: 10 TABLET, FILM COATED ORAL at 08:25

## 2025-01-01 RX ADMIN — GABAPENTIN 600 MG: 600 TABLET, FILM COATED ORAL at 19:28

## 2025-01-01 RX ADMIN — BENZOYL PEROXIDE: 50 GEL TOPICAL at 08:24

## 2025-01-01 RX ADMIN — SERTRALINE HYDROCHLORIDE 50 MG: 50 TABLET ORAL at 08:26

## 2025-01-01 RX ADMIN — CYCLOBENZAPRINE HYDROCHLORIDE 10 MG: 5 TABLET, FILM COATED ORAL at 06:44

## 2025-01-01 RX ADMIN — DOCUSATE SODIUM 100 MG: 100 CAPSULE, LIQUID FILLED ORAL at 08:05

## 2025-01-01 RX ADMIN — GABAPENTIN 600 MG: 600 TABLET, FILM COATED ORAL at 08:24

## 2025-01-01 RX ADMIN — NICOTINE POLACRILEX 4 MG: 4 GUM, CHEWING BUCCAL at 16:29

## 2025-01-01 RX ADMIN — NICOTINE POLACRILEX 4 MG: 4 GUM, CHEWING BUCCAL at 06:46

## 2025-01-01 RX ADMIN — Medication 2 G: at 08:24

## 2025-01-01 RX ADMIN — MENTHOL 1 PATCH: 205.5 PATCH TOPICAL at 13:24

## 2025-01-01 RX ADMIN — NICOTINE POLACRILEX 4 MG: 4 GUM, CHEWING BUCCAL at 18:29

## 2025-01-01 RX ADMIN — ARIPIPRAZOLE 20 MG: 20 TABLET ORAL at 08:24

## 2025-01-01 RX ADMIN — NICOTINE POLACRILEX 4 MG: 4 GUM, CHEWING BUCCAL at 08:38

## 2025-01-01 RX ADMIN — NICOTINE POLACRILEX 4 MG: 4 GUM, CHEWING BUCCAL at 19:28

## 2025-01-01 RX ADMIN — DOCUSATE SODIUM 100 MG: 100 CAPSULE, LIQUID FILLED ORAL at 19:28

## 2025-01-01 RX ADMIN — IBUPROFEN 600 MG: 200 TABLET, FILM COATED ORAL at 06:44

## 2025-01-01 RX ADMIN — NICOTINE 1 PATCH: 21 PATCH, EXTENDED RELEASE TRANSDERMAL at 08:38

## 2025-01-01 ASSESSMENT — ACTIVITIES OF DAILY LIVING (ADL)
ADLS_ACUITY_SCORE: 48
ORAL_HYGIENE: INDEPENDENT
ADLS_ACUITY_SCORE: 48
ORAL_HYGIENE: INDEPENDENT
ADLS_ACUITY_SCORE: 48
LAUNDRY: WITH SUPERVISION
ADLS_ACUITY_SCORE: 48
DRESS: INDEPENDENT
ADLS_ACUITY_SCORE: 48
HYGIENE/GROOMING: INDEPENDENT
ADLS_ACUITY_SCORE: 48
HYGIENE/GROOMING: INDEPENDENT
ADLS_ACUITY_SCORE: 48
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
## 2025-01-01 NOTE — PLAN OF CARE
Pt appeared to be sleeping on his mattress he placed on the floor at the foot of his bed.  He has been off SIO for several shifts now and reports no SIB/SI urges when awake.  Pt last requested PRN Oxycodone at 10 pm and agreed that his next Oxycodone dose will be on day shift.  He has not approached night staff for any PRN medications since.  Appears to be quietly sleeping in his room much of the night.  6.25 hours of sleep is recorded.

## 2025-01-01 NOTE — PLAN OF CARE
Goal Outcome Evaluation:    Initial meeting note:    Therapist introduced self to patient and discussed psychotherapy service available to patient.     Pt response: Pt expressed interest in meeting with therapist    Plan: Made plan to meet with pt again; began identifying goals     Writer tried to wake pt twice to group. He didn't hear his name being called nor door knocking. Writer checked in at lunch. He said he was refusing calls from the person that is asking for money, that is taking care of his dog. He just gave him $500 and he is asking for more. Read previous note from 12/31/24 , individual session. Team will discuss how to support him regarding this financial situation tomorrow. Writer invited him to attend evening group today.
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
## 2025-01-01 NOTE — PROGRESS NOTES
[facility] Medicine Cross Cover Note    January 1, 2025 8:29 AM    I was notified by bedside nurse that this patient had chest pain.  Prakash endorses chest pain since he woke up this morning around 6:30 or 7 am. It is parasternal on the left, did not worsen with activity, improved with eating breakfast. Suspects it may be acid reflux.     Pain not reproducible on exam    Action taken:   -ECG and trop pending; hospitalist will follow up  -Prakash to ask for maalox which is already ordered prn    Communicated plan via secure messaging.     I spent 16 minutes on the date of the encounter doing chart review, history and exam, documentation and further activities noted above.       Ata Hodges, DO on 1/1/2025 at 8:29 AM

## 2025-01-01 NOTE — PROVIDER NOTIFICATION
Pt reported chest pain to writer at 0735, vitals taken and were WDL. Pt denies that anything makes it worse or improves it. Pt describes pain as constant. On call IM provider paged and ECG and trop ordered by provider.

## 2025-01-02 ENCOUNTER — TELEPHONE (OUTPATIENT)
Dept: PSYCHIATRY | Facility: CLINIC | Age: 35
End: 2025-01-02
Payer: MEDICARE

## 2025-01-02 ENCOUNTER — TELEPHONE (OUTPATIENT)
Dept: BEHAVIORAL HEALTH | Facility: CLINIC | Age: 35
End: 2025-01-02
Payer: MEDICARE

## 2025-01-02 VITALS
OXYGEN SATURATION: 97 % | TEMPERATURE: 98.7 F | HEART RATE: 98 BPM | SYSTOLIC BLOOD PRESSURE: 130 MMHG | RESPIRATION RATE: 16 BRPM | WEIGHT: 228.6 LBS | DIASTOLIC BLOOD PRESSURE: 85 MMHG | BODY MASS INDEX: 36.34 KG/M2

## 2025-01-02 LAB
ATRIAL RATE - MUSE: 74 BPM
DIASTOLIC BLOOD PRESSURE - MUSE: NORMAL MMHG
GLUCOSE BLDC GLUCOMTR-MCNC: 137 MG/DL (ref 70–99)
GLUCOSE BLDC GLUCOMTR-MCNC: 186 MG/DL (ref 70–99)
INTERPRETATION ECG - MUSE: NORMAL
P AXIS - MUSE: 34 DEGREES
PR INTERVAL - MUSE: 154 MS
QRS DURATION - MUSE: 96 MS
QT - MUSE: 382 MS
QTC - MUSE: 424 MS
R AXIS - MUSE: 24 DEGREES
SYSTOLIC BLOOD PRESSURE - MUSE: NORMAL MMHG
T AXIS - MUSE: 21 DEGREES
VENTRICULAR RATE- MUSE: 74 BPM

## 2025-01-02 PROCEDURE — 250N000013 HC RX MED GY IP 250 OP 250 PS 637: Performed by: NURSE PRACTITIONER

## 2025-01-02 PROCEDURE — 250N000012 HC RX MED GY IP 250 OP 636 PS 637: Performed by: REGISTERED NURSE

## 2025-01-02 PROCEDURE — 250N000013 HC RX MED GY IP 250 OP 250 PS 637

## 2025-01-02 PROCEDURE — 97150 GROUP THERAPEUTIC PROCEDURES: CPT | Mod: GO

## 2025-01-02 PROCEDURE — 250N000013 HC RX MED GY IP 250 OP 250 PS 637: Performed by: PSYCHIATRY & NEUROLOGY

## 2025-01-02 PROCEDURE — 250N000013 HC RX MED GY IP 250 OP 250 PS 637: Performed by: CLINICAL NURSE SPECIALIST

## 2025-01-02 PROCEDURE — 250N000013 HC RX MED GY IP 250 OP 250 PS 637: Performed by: REGISTERED NURSE

## 2025-01-02 PROCEDURE — A9270 NON-COVERED ITEM OR SERVICE: HCPCS | Performed by: PSYCHIATRY & NEUROLOGY

## 2025-01-02 PROCEDURE — 124N000002 HC R&B MH UMMC

## 2025-01-02 RX ORDER — MELOXICAM 7.5 MG/1
7.5 TABLET ORAL DAILY
Status: DISPENSED | OUTPATIENT
Start: 2025-01-02

## 2025-01-02 RX ORDER — ROSUVASTATIN CALCIUM 40 MG/1
40 TABLET, COATED ORAL DAILY
Qty: 30 TABLET | Refills: 0 | Status: SHIPPED | OUTPATIENT
Start: 2025-01-02

## 2025-01-02 RX ORDER — LORAZEPAM 0.5 MG/1
0.5 TABLET ORAL 2 TIMES DAILY
Status: DISCONTINUED | OUTPATIENT
Start: 2025-01-02 | End: 2025-01-02

## 2025-01-02 RX ORDER — GABAPENTIN 600 MG/1
600 TABLET ORAL AT BEDTIME
Status: DISPENSED | OUTPATIENT
Start: 2025-01-02 | End: 2025-01-06

## 2025-01-02 RX ORDER — LORAZEPAM 0.5 MG/1
0.5 TABLET ORAL 2 TIMES DAILY PRN
Status: DISPENSED | OUTPATIENT
Start: 2025-01-02

## 2025-01-02 RX ADMIN — NICOTINE 1 PATCH: 21 PATCH, EXTENDED RELEASE TRANSDERMAL at 09:32

## 2025-01-02 RX ADMIN — IBUPROFEN 600 MG: 200 TABLET, FILM COATED ORAL at 05:17

## 2025-01-02 RX ADMIN — OMEPRAZOLE 20 MG: 20 CAPSULE, DELAYED RELEASE ORAL at 08:41

## 2025-01-02 RX ADMIN — CYCLOBENZAPRINE HYDROCHLORIDE 10 MG: 5 TABLET, FILM COATED ORAL at 12:28

## 2025-01-02 RX ADMIN — Medication 2 G: at 21:21

## 2025-01-02 RX ADMIN — ROSUVASTATIN 40 MG: 20 TABLET, FILM COATED ORAL at 08:40

## 2025-01-02 RX ADMIN — SERTRALINE HYDROCHLORIDE 50 MG: 50 TABLET ORAL at 08:40

## 2025-01-02 RX ADMIN — BUPRENORPHINE HYDROCHLORIDE 75 MCG: 75 FILM, SOLUBLE BUCCAL at 14:05

## 2025-01-02 RX ADMIN — NICOTINE POLACRILEX 4 MG: 4 GUM, CHEWING BUCCAL at 11:40

## 2025-01-02 RX ADMIN — POLYETHYLENE GLYCOL 3350 17 G: 17 POWDER, FOR SOLUTION ORAL at 08:40

## 2025-01-02 RX ADMIN — HYDROXYZINE HYDROCHLORIDE 75 MG: 25 TABLET ORAL at 20:57

## 2025-01-02 RX ADMIN — CYCLOBENZAPRINE HYDROCHLORIDE 10 MG: 5 TABLET, FILM COATED ORAL at 05:18

## 2025-01-02 RX ADMIN — TESTOSTERONE 100 MG OF TESTOSTERONE: 50 GEL TRANSDERMAL at 10:21

## 2025-01-02 RX ADMIN — LORAZEPAM 0.5 MG: 0.5 TABLET ORAL at 17:12

## 2025-01-02 RX ADMIN — VALPROIC ACID 500 MG: 250 SOLUTION ORAL at 08:40

## 2025-01-02 RX ADMIN — VALPROIC ACID 500 MG: 250 SOLUTION ORAL at 20:57

## 2025-01-02 RX ADMIN — NICOTINE POLACRILEX 4 MG: 4 GUM, CHEWING BUCCAL at 08:56

## 2025-01-02 RX ADMIN — IBUPROFEN 600 MG: 200 TABLET, FILM COATED ORAL at 21:29

## 2025-01-02 RX ADMIN — ARIPIPRAZOLE 20 MG: 20 TABLET ORAL at 08:40

## 2025-01-02 RX ADMIN — CYCLOBENZAPRINE HYDROCHLORIDE 10 MG: 5 TABLET, FILM COATED ORAL at 21:29

## 2025-01-02 RX ADMIN — VALACYCLOVIR 500 MG: 500 TABLET, FILM COATED ORAL at 08:41

## 2025-01-02 RX ADMIN — NICOTINE POLACRILEX 4 MG: 4 GUM, CHEWING BUCCAL at 20:58

## 2025-01-02 RX ADMIN — Medication 2 G: at 16:54

## 2025-01-02 RX ADMIN — MELOXICAM 7.5 MG: 7.5 TABLET ORAL at 09:33

## 2025-01-02 RX ADMIN — NICOTINE POLACRILEX 4 MG: 4 GUM, CHEWING BUCCAL at 22:30

## 2025-01-02 RX ADMIN — NICOTINE POLACRILEX 4 MG: 4 GUM, CHEWING BUCCAL at 07:54

## 2025-01-02 RX ADMIN — IBUPROFEN 600 MG: 200 TABLET, FILM COATED ORAL at 12:28

## 2025-01-02 RX ADMIN — NICOTINE POLACRILEX 4 MG: 4 GUM, CHEWING BUCCAL at 19:12

## 2025-01-02 RX ADMIN — BENZOYL PEROXIDE: 50 GEL TOPICAL at 09:34

## 2025-01-02 RX ADMIN — DOCUSATE SODIUM 100 MG: 100 CAPSULE, LIQUID FILLED ORAL at 08:40

## 2025-01-02 RX ADMIN — NICOTINE POLACRILEX 4 MG: 4 GUM, CHEWING BUCCAL at 12:48

## 2025-01-02 RX ADMIN — CLINDAMYCIN PHOSPHATE: 10 GEL TOPICAL at 09:34

## 2025-01-02 RX ADMIN — DOCUSATE SODIUM 100 MG: 100 CAPSULE, LIQUID FILLED ORAL at 20:58

## 2025-01-02 RX ADMIN — VALPROIC ACID 500 MG: 250 SOLUTION ORAL at 14:47

## 2025-01-02 RX ADMIN — Medication: at 19:00

## 2025-01-02 RX ADMIN — NICOTINE POLACRILEX 4 MG: 4 GUM, CHEWING BUCCAL at 14:47

## 2025-01-02 RX ADMIN — GABAPENTIN 600 MG: 600 TABLET, FILM COATED ORAL at 08:40

## 2025-01-02 RX ADMIN — Medication 2 G: at 07:56

## 2025-01-02 RX ADMIN — GABAPENTIN 600 MG: 600 TABLET, FILM COATED ORAL at 21:00

## 2025-01-02 RX ADMIN — NICOTINE POLACRILEX 4 MG: 4 GUM, CHEWING BUCCAL at 18:12

## 2025-01-02 RX ADMIN — MENTHOL 1 PATCH: 205.5 PATCH TOPICAL at 09:34

## 2025-01-02 RX ADMIN — ACETAMINOPHEN 650 MG: 325 TABLET, FILM COATED ORAL at 17:12

## 2025-01-02 RX ADMIN — NICOTINE POLACRILEX 4 MG: 4 GUM, CHEWING BUCCAL at 16:41

## 2025-01-02 RX ADMIN — ACETAMINOPHEN 650 MG: 325 TABLET, FILM COATED ORAL at 07:53

## 2025-01-02 RX ADMIN — NICOTINE POLACRILEX 4 MG: 4 GUM, CHEWING BUCCAL at 06:32

## 2025-01-02 RX ADMIN — EMPAGLIFLOZIN 10 MG: 10 TABLET, FILM COATED ORAL at 08:40

## 2025-01-02 RX ADMIN — NICOTINE POLACRILEX 4 MG: 4 GUM, CHEWING BUCCAL at 10:22

## 2025-01-02 RX ADMIN — HYDROXYZINE HYDROCHLORIDE 50 MG: 25 TABLET ORAL at 05:18

## 2025-01-02 RX ADMIN — AMLODIPINE BESYLATE 5 MG: 5 TABLET ORAL at 08:40

## 2025-01-02 RX ADMIN — INSULIN GLARGINE 5 UNITS: 100 INJECTION, SOLUTION SUBCUTANEOUS at 08:51

## 2025-01-02 RX ADMIN — OXYCODONE 5 MG: 5 TABLET ORAL at 07:54

## 2025-01-02 ASSESSMENT — ACTIVITIES OF DAILY LIVING (ADL)
ADLS_ACUITY_SCORE: 48
LAUNDRY: WITH SUPERVISION
ADLS_ACUITY_SCORE: 48
HYGIENE/GROOMING: INDEPENDENT
ADLS_ACUITY_SCORE: 48

## 2025-01-02 NOTE — TELEPHONE ENCOUNTER
----- Message from Ting Alaniz sent at 1/2/2025 12:15 PM CST -----  Transition Clinic Referral   [place]       Please Check Type of Referral Requested:       ____THERAPY: The Transition clinic is able to schedule patients without current medical insurance; these patient will be referred to our Social Work Care Coordinator for Medical Insurance              Assistance. We are open for referral for psychotherapy. Patient is referred from: [facility]      X MEDICATION:   Referrals for Medication are ONLY accepted from the following areas (select): Inpatient Mental Health Unit - HIGH PRIORITY                                      Suboxone and Opioid Management Referrals are automatically denied.   [facility] cannot see patient without active medical insurance.   Next level of psychiatry care must be within 12 months.  [facility] cannot accept patient with next level of care scheduled with PCP.  The transition clinic cannot follow patients who are on a restricted recipient program.    X Long-Acting Injection (DOMINGUEZ)?    Is referred patient receiving a long-acting injection (DOMINGUEZ)?  If YES, provide the following:    Name and dose of Medication: Abilify Maintena 400mg  Date Injection was last administered to patient: 1/3/25  Next Long- Acting injection Due Date: 1/24/25    Is referred patient transferring from [facility]?  If YES, provide the following:     X   DOMINGUEZ will be receiving DOMINGUEZ at [facility] [place]  ___  DOMINGUEZ will be administered per an IRTS or elsewhere in the community      Referring Provider Contact Name: Valeri Ro [facility]; Phone Number: [phone] Ting Alaniz covering this today [phone]    Reason for Transition Clinic Referral: [facility] and DOMINGUEZ administration.    Next Level of Care Patient Will Be Transitioned To: [facility] Interventional Psychiatry  Provider(s) Thong Bellamy MD  Location [facility]   Date/Time 2/11  945am    What Would Be Helpful from the Transition Clinic: Need     Requesting to schedule in collaboration with patient (via Teams): Yes Where is the patient located?  [address]      Needs:NO    Does Patient Have Access to Technology: Yes    Patient E-mail Address: [email or web address]    Current Patient Phone Number: [phone];     Clinician Gender Preference (if applicable): NO    Patient location preference:In person    Ting Alaniz, JULISA, LADC    (Master Form: Updated 11/28/2023)

## 2025-01-02 NOTE — PLAN OF CARE
Problem: Anxiety Signs/Symptoms  Goal: Optimized Energy Level (Anxiety Signs/Symptoms)  Outcome: Progressing  Intervention: Optimize Energy Level  Recent Flowsheet Documentation  Taken 1/2/2025 0808 by Espinoza Mccormick, RN  Activity (Behavioral Health): up ad joshua   Goal Outcome Evaluation:    Plan of Care Reviewed With: patient        Pt has remained visible in milieu throughout shift. During conversation with writer pt denied anxiety and reported auditory hallucinations have returned to baseline. Pt reports he slept poorly which he attributes to his neighbor being awake through the night. Pt denied suicidal thoughts, homicidal thoughts, or thoughts to self-injure. Pt committed to safety saying he would seek out staff if these feelings developed. Pt continues to endorse back pain at 7-9/10 severity. Pt received his first dose of buprenorphine this afternoon and tolerated it well. After writer provided verbal education pt denied having additional questions about the medication.

## 2025-01-02 NOTE — PLAN OF CARE

## 2025-01-02 NOTE — PROGRESS NOTES
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 mg/dL    Bilirubin Urine Negative Negative    Ketones Urine Negative Negative mg/dL    Specific Gravity Urine 1.006 1.003 - 1.035    Blood Urine Negative Negative    pH Urine 7.0 5.0 - 7.0    Protein Albumin Urine Negative Negative mg/dL    Urobilinogen Urine Normal Normal, 2.0 mg/dL    Nitrite Urine Negative Negative    Leukocyte Esterase Urine Small (A) Negative    RBC Urine 0 <=2 /HPF    WBC Urine 1 <=5 /HPF    Squamous Epithelials Urine 1 <=1 /HPF   Glucose by meter    Collection Time: 12/22/24  5:40 PM   Result Value Ref Range    GLUCOSE BY METER POCT 152 (H) 70 - 99 mg/dL   Glucose by meter    Collection Time: 12/23/24  7:42 AM   Result Value Ref Range    GLUCOSE BY METER POCT 132 (H) 70 - 99 mg/dL   Glucose by meter    Collection Time: 12/23/24  5:34 PM   Result Value Ref Range    GLUCOSE BY METER POCT 99 70 - 99 mg/dL   Glucose by meter    Collection Time: 12/23/24 11:56 PM   Result Value Ref Range    GLUCOSE BY METER POCT 155 (H) 70 - 99 mg/dL   Glucose by meter    Collection Time: 12/24/24  7:35 AM   Result Value Ref Range    GLUCOSE BY METER POCT 153 (H) 70 - 99 mg/dL    Valproic acid    Collection Time: 12/24/24  8:09 AM   Result Value Ref Range    Valproic acid 46.0 (L)   ug/mL   Glucose by meter    Collection Time: 12/24/24  5:53 PM   Result Value Ref Range    GLUCOSE BY METER POCT 144 (H) 70 - 99 mg/dL   Glucose by meter    Collection Time: 12/25/24  7:42 AM   Result Value Ref Range    GLUCOSE BY METER POCT 198 (H) 70 - 99 mg/dL   Glucose by meter    Collection Time: 12/25/24 11:38 AM   Result Value Ref Range    GLUCOSE BY METER POCT 115 (H) 70 - 99 mg/dL   Glucose by meter    Collection Time: 12/25/24  5:33 PM   Result Value Ref Range    GLUCOSE BY METER POCT 167 (H) 70 - 99 mg/dL   Glucose by meter    Collection Time: 12/26/24 12:05 PM   Result Value Ref Range    GLUCOSE BY METER POCT 114 (H) 70 - 99 mg/dL   Glucose by meter    Collection Time: 12/26/24  5:49 PM   Result Value Ref Range    GLUCOSE BY METER POCT 199 (H) 70 - 99 mg/dL   Glucose by meter    Collection Time: 12/27/24  7:53 AM   Result Value Ref Range    GLUCOSE BY METER POCT 200 (H) 70 - 99 mg/dL   Glucose by meter    Collection Time: 12/27/24  8:16 PM   Result Value Ref Range    GLUCOSE BY METER POCT 206 (H) 70 - 99 mg/dL   Glucose by meter    Collection Time: 12/28/24  7:43 AM   Result Value Ref Range    GLUCOSE BY METER POCT 195 (H) 70 - 99 mg/dL   Glucose by meter    Collection Time: 12/28/24  5:27 PM   Result Value Ref Range    GLUCOSE BY METER POCT 117 (H) 70 - 99 mg/dL   Valproic acid    Collection Time: 12/29/24  7:15 AM   Result Value Ref Range    Valproic acid 47.8 (L)   ug/mL   CK total    Collection Time: 12/29/24  7:15 AM   Result Value Ref Range     26 - 308 U/L   Glucose by meter    Collection Time: 12/29/24  7:29 AM   Result Value Ref Range    GLUCOSE BY METER POCT 220 (H) 70 - 99 mg/dL   Glucose by meter    Collection Time: 12/29/24  5:31 PM   Result Value Ref Range    GLUCOSE BY METER POCT 135 (H) 70 - 99 mg/dL   Glucose by meter    Collection Time: 12/30/24  7:49 AM   Result Value Ref  Range    GLUCOSE BY METER POCT 216 (H) 70 - 99 mg/dL   Glucose by meter    Collection Time: 12/30/24  5:08 PM   Result Value Ref Range    GLUCOSE BY METER POCT 163 (H) 70 - 99 mg/dL   Glucose by meter    Collection Time: 12/31/24  8:09 AM   Result Value Ref Range    GLUCOSE BY METER POCT 131 (H) 70 - 99 mg/dL   Glucose by meter    Collection Time: 12/31/24  5:48 PM   Result Value Ref Range    GLUCOSE BY METER POCT 160 (H) 70 - 99 mg/dL   Glucose by meter    Collection Time: 01/01/25  8:09 AM   Result Value Ref Range    GLUCOSE BY METER POCT 143 (H) 70 - 99 mg/dL   Troponin T, High Sensitivity    Collection Time: 01/01/25  8:12 AM   Result Value Ref Range    Troponin T, High Sensitivity <6 <=22 ng/L   Extra Purple Top Tube    Collection Time: 01/01/25  8:12 AM   Result Value Ref Range    Hold Specimen [facility]    EKG 12-lead, complete    Collection Time: 01/01/25  9:23 AM   Result Value Ref Range    Systolic Blood Pressure  mmHg    Diastolic Blood Pressure  mmHg    Ventricular Rate 74 BPM    Atrial Rate 74 BPM    WA Interval 154 ms    QRS Duration 96 ms     ms    QTc 424 ms    P Axis 34 degrees    R AXIS 24 degrees    T Axis 21 degrees    Interpretation ECG Sinus rhythm  Normal ECG      Glucose by meter    Collection Time: 01/01/25  5:58 PM   Result Value Ref Range    GLUCOSE BY METER POCT 135 (H) 70 - 99 mg/dL       Attestation:  Patient has been seen and evaluated by me, Ana Pa, APRN, CNP.  I spent >50 min on the date of the encounter in chart review, patient visit, review of tests, documentation, care coordination, and/or discussion with other providers about the issues documented above.

## 2025-01-02 NOTE — PLAN OF CARE
Pt appeared to be quietly resting in his room but was occasionally awake early in the night.  He slept uninterrupted from 1:15 am until 4:50 am.  Pt approached staff to request medications for low back pain upon waking up.    At 5:15 am PRN Flexeril, Ibuprofen and Hydroxyzine is given.    Approximately 4 hours of sleep is recorded tonight.

## 2025-01-02 NOTE — CARE PLAN
Rehab Group    Start time: 1015  End time: 1200  Patient time total: 15 minutes    attended partial group    #4 attended   Group Type: occupational therapy   Group Topic Covered: balanced lifestyle, cognitive activities, coping skills, healthy leisure time, self-care, self-esteem, and social skills       Group Session Detail:   Affirmation or Gratitude Calendars for concentration, fostering a more positive mindset, creative expression, follow through, coping, mood stabilization, reality-based activity, fostering hope for a sustainable recovery, organization/planning, building self-esteem, and quiet socialization.       Patient Response/Contribution:  socially appropriate and worked intermittently       Patient Detail:  Pt joined group during the final half hour.  Pt disclosed that if it got to be too loud or chaotic he would need to leave as it ramps up pt's anxiety.  This writer expressed empathy and encouragement to do what they need to do to care for themselves.  Pt confided that it has been difficult to be here with the two peers who are manic and having a lot of loud and up and down feelings.  Pt did briefly work on their journaling that they arrived to group with and thought about making a calendar as well.  Pt seemed to be having some difficulty settling into either activity and eventually left the group space without being very productive.  Pt was pleasant and social with therapist and a male peer who was calm and respectful.          [id number] OT Group (2 or more in attendance)      Patient Active Problem List   Diagnosis    ADHD (attention deficit hyperactivity disorder)    Marijuana abuse    Polysubstance abuse (H)    GERD (gastroesophageal reflux disease)    Tobacco abuse    Intractable back pain    Optic neuritis    Cauda equina syndrome with neurogenic bladder (H)    Schizoaffective disorder, bipolar type (H)    PTSD (post-traumatic stress disorder)    Anxiety    Auditory hallucination    Class 2 obesity  due to excess calories without serious comorbidity with body mass index (BMI) of 36.0 to 36.9 in adult    Nephrolithiasis    Cannabis use disorder, severe, dependence (H)    Depression    History of heroin abuse (H)    Opioid use disorder, severe, dependence (H)    Substance-induced psychotic disorder with hallucinations (H)    Nausea    Urinary retention    Chronic bilateral low back pain without sciatica    AVA (generalized anxiety disorder)    Lumbosacral radiculopathy at L5    Abnormal uterine bleeding    Bipolar affective disorder, mixed, severe, with psychotic behavior (H)    Akathisia    Hypertension, unspecified type    Female-to-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 person    Morbid obesity (H)    Mood disorder due to a general medical condition    Acne vulgaris    Type 2 diabetes mellitus with hyperglycemia, with long-term current use of insulin (H)    Herpes labialis    Major depressive disorder, 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

## 2025-01-02 NOTE — TELEPHONE ENCOUNTER
Writer spoke with [facility] and scheduled TC psychiatry on 01/13/2025 @ 10:30am. Routing to TC RN pool as pt injection 01/24. Referral source listed below in referral.    Simran Peres  01/02/2025  1241
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
## 2025-01-02 NOTE — TELEPHONE ENCOUNTER
Prior Authorization Approval    Medication: BELBUCA 150 MCG BU FILM  Authorization Effective Date: 1/1/2025  Authorization Expiration Date: 1/2/2026  Approved Dose/Quantity: 60 for 30  Reference #: [id number]   Insurance Company: [employer] Part D - Phone [phone] Fax [phone]  Expected CoPay: $    CoPay Card Available:      Financial Assistance Needed:   Which Pharmacy is filling the prescription:    Pharmacy Notified:   Patient Notified:

## 2025-01-02 NOTE — CONSULTS
Patient has Medicare D through [employer], and Medical Assistance through [employer].    Belbuca 150 mcg BID: Prior auth required, which I have obtained.  Copay is $0.    Valproic acid 500mg/10ml TID: $0.    Abilify Maintenna is a Clinic Administered Medication (CAM), which is billed under medical benefits.  As such, pricing on this can not be estimated by Pharmacy Liaison.  Typically patients such as this who have a Medical Assistance plan and meet medical criteria pay $0 for CAM meds but the only way to definitively determine this is to follow the steps below.     Coverage will be secured by the CAM and Infusion Finance teams when:  an outpatient order for this injection is placed in chart, AND  an appointment for administration at an [facility]/infusion center is scheduled.    If patient would like a price estimate prior to receiving the injection outpatient, patient may:  contact Member Services department of their insurance or   complete a cost of care estimate request at [email or web address], or by calling [phone]  This may also be completed by any member of the care team.  Turnaround time is 1-3 days.  Pricing given by this team is only valid at [facility] or infusion sites.  In both cases,  J-code  and CPT code 62595 should be referenced.    Elizabeth Crain  Pharmacy Technician/Liaison, Discharge Pharmacy   [phone] (voice or text)  [email or web address]  Pharmacy test claims are estimates and may not reflect final costs.   Suggested alternatives aim to be cost-effective but may not be therapeutically equivalent as this consult is informational and does not constitute medical advice.   Clinical decisions should be made by qualified healthcare providers.

## 2025-01-02 NOTE — CARE PLAN
Rehab Group    Start time: 1315  End time: 1400  Patient time total: 30 minutes    attended partial group    #1 attended   Group Type: occupational therapy   Group Topic Covered: balanced lifestyle, cognitive activities, coping skills, emotional regulation, healthy leisure time, relaxation , self-esteem, and social skills       Group Session Detail:  Doodle Dice activity for healthy leisure exploration, concentration, simple statistical reasoning skills, coping, mood stabilization, reality-based activity, follow through, an opportunity to experience success, and socialization.       Patient Response/Contribution:  socially appropriate, attentive, and actively engaged       Patient Detail:  Pt was pleasant and invested in the group activity.  Pt was able to learn a new activity quickly and displayed good statistical reasoning skills.  Pt was social with familiar therapist and shared they were looking forward to discharging home soon.          Unable to charge as there were not enough participants to bill.30      Patient Active Problem List   Diagnosis    ADHD (attention deficit hyperactivity disorder)    Marijuana abuse    Polysubstance abuse (H)    GERD (gastroesophageal reflux disease)    Tobacco abuse    Intractable back pain    Optic neuritis    Cauda equina syndrome with neurogenic bladder (H)    Schizoaffective disorder, bipolar type (H)    PTSD (post-traumatic stress disorder)    Anxiety    Auditory hallucination    Class 2 obesity due to excess calories without serious comorbidity with body mass index (BMI) of 36.0 to 36.9 in adult    Nephrolithiasis    Cannabis use disorder, severe, dependence (H)    Depression    History of heroin abuse (H)    Opioid use disorder, severe, dependence (H)    Substance-induced psychotic disorder with hallucinations (H)    Nausea    Urinary retention    Chronic bilateral low back pain without sciatica    AVA (generalized anxiety disorder)    Lumbosacral radiculopathy at L5     Abnormal uterine bleeding    Bipolar affective disorder, mixed, severe, with psychotic behavior (H)    Akathisia    Hypertension, unspecified type    Female-to-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 person    Morbid obesity (H)    Mood disorder due to a general medical condition    Acne vulgaris    Type 2 diabetes mellitus with hyperglycemia, with long-term current use of insulin (H)    Herpes labialis    Major depressive disorder, 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

## 2025-01-02 NOTE — PLAN OF CARE
Upcoming Meetings and Appointments:   Team note: Monday     Tasks Complete:  Chart review and met with team, discussed pt progress, symptomology, and response to treatment.  Discussed the discharge plan and any potential impediments to discharge    Emailed pt's , Jie David at [facility], stating pt has been calling her with no success to discuss getting his house keys and his dog from the person who has them prior to discharge tomorrow, 1/3.     Received email response from Jie David stating she is out of the office until 1/3.    Called Jie David's supervisor, Milagros Alberto at [phone] and left VM.      Jie David's supervisor, Milagros Alberto returned call, advised of pt's discharge plan concern's with obtaining his house childs and dog.  Milagros called the unit and talked with the pt concerning his house keys and dog.  Milagros also agreed to sign pt's PD.  Emailed PD to Milagros.    SUHA Maguire [facility] and scheduled pt for intake DA and first appointment with psychitary. Updated AVS.    Referral for gap psychiatry and DOMINGUEZ administration sent via [employer] to [facility].    Called Addiction Medicine, [phone], spoke with Tika, scheduled appt for Monday, 1/6/25 @1000 with Lucie Fiore at [address] , [facility] [address], [place] [address].  Arrive for appt by 0930.     Completed discharge instructions. Printed and reviewed with pt.  Drafted PD and reviewed with pt. He signed. Sent to TCM supervisor Nadine for signature at [email or web address]. Included pt's follow up appointments in email body     Discharge Plan or Goal   Plan  Discharge home with outpatient supports on 1/3/25.    Care Team   Current Treatment Providers are:  Primary Physician: Becki Avery with [facility] in [place]      Psychiatrist/Medication Management:  Name/Organization: Reports he was seeing a provider at [facility] but wants to find a new  provider with whom he feels a better connection with. Per [facility], he has been discharged due to not complying with recommended VAL's     :  Name/Organization: Jie David  [facility] Apartment: Reports he moved into his apartment in August of this year, location is [place].  Name/Organization: [facility], owner Fatou Martinez       [facility]:  Name/Organization: [facility] -  Rescheduled [facility] appt for Wednesday, 1/22/25 @0900 with Zohra valencia.        CADI Worker:  Name/Organization: Silvia Ham  [facility]  Phone:       Art Therapy: [facility]   Fannie next apt 1/4/25 @11Am     Individual Therapy: [facility]  Scheduled individual therapy new intake appt for Tuesday, 1/7/25 @1400 with Kwame corbin.      Addiction Medicine: Scheduled appt for Monday, 1/6/25 @1000 with Lucie Fiore at [address] [facility] [address], [place] [address].  Arrive for appt by 0930.       Barriers to Discharge   Ongoing stabilization      Referral Status  TBD     Legal Status  Court hold. PD revoked 12/23  Recommitted with no mac.   Date: 09/02/24- 12/02/25    Next Steps:     Prakash would like a new psych provider- Per patient, his  has recommendations for psychiatry. I made two attempts to request additional information. TBD. I called [facility] on behalf of pt to request a new provider . I was informed that Prakash has been discharged from [facility] completely due to not complying signing the required release of information needed to provide care . To do: either schedule a psych apt with new provider at a new clinic if CM does not confirm. Follow up with CM

## 2025-01-03 NOTE — PLAN OF CARE
"Problem: Adult Behavioral Health Plan of Care  Goal: Optimized Coping Skills in Response to Life Stressors  Outcome: Progressing     Problem: Adult Behavioral Health Plan of Care  Goal: Develops/Participates in Therapeutic [place] to Support Successful Transition  Outcome: Progressing     Goal Outcome Evaluation:    Plan of Care Reviewed With: patient      Pt was resting in bed for an hour this evening. Reported he was very anxious about going home but stated, \"nothing's gonna happen\" relating to preparing for discharge. Requested 0.5 mg lorazepam. Pt denied SI or SIB, and did not refer to . Pt was concerned about how he felt getting suboxone, mentioned feeling flushed and having a headache. Provided tylenol which resolved headache. Pt reported left leg and gluteus pain later in evening and was given ibuprofen and flexeril at 2100 which reduced pain from 7/10 to a 5/10.     Pt reported fears about his impulsivity at home but hopes to avoid returning to inpatient setting. Denied having SIB urges when at home. Pt mentioned his fear would be \"ODing on my medications\" but added, \"I'm not thinking of doing this.\" Discussed removal of triggers such as reducing medications in his apartment.       Pt was briefly in music group. Reported knowing he needed to leave expressing appropriately that unit was too chaotic for him. In addition, practiced setting boundaries with female peer but struggled with this. Pt wrote his appts down in his planner in [facility]. Wrote down a list of healthy coping skills that are available to him. Began sorting through belongings in preparation of leaving tomorrow.     /85 (BP Location: Left arm, Patient Position: Sitting, Cuff Size: Adult Large)   Pulse 98   Temp 98.7  F (37.1  C) (Oral)   Resp 16   Wt 103.7 kg (228 lb 9.6 oz)   SpO2 97%   BMI 36.34 kg/m        "

## 2025-01-04 ENCOUNTER — NURSE TRIAGE (OUTPATIENT)
Dept: NURSING | Facility: CLINIC | Age: 35
End: 2025-01-04
Payer: MEDICARE

## 2025-01-04 NOTE — TELEPHONE ENCOUNTER
"Patient states he started taking Belbuca yesterday: now his tongue is painful with 2-3 white spots. Pain=\"5\". Painful to eat and drink. No swelling or fever noted.  Prescribed by Harsha @ [facility] NP in patient. PCP=Becki Avery @ [facility].    Triaged to a disposition of see provider within 3 days. Discussed option of UC if desired this weekend. OTC pain med. Recommended to consult with pharmacist re: if Belbuca could cause his sx. Discuss with provider before discontinuing medication. Home Care per guideline. Call transferred to .     Hanh Calzada RN Triage Nurse Advisor 8:49 AM 1/4/2025   Reason for Disposition   Diabetes mellitus or weak immune system (e.g., HIV positive, cancer chemo, splenectomy, organ transplant, chronic steroids)    Additional Information   Negative: SEVERE difficulty breathing (e.g., struggling for each breath, speaks in single words, stridor)   Negative: Sounds like a life-threatening emergency to the triager   Negative: Followed a tooth (or teeth) injury   Negative: Followed a mouth injury   Negative: Throat is painful   Negative: Canker sore suspected (i.e., aphthous ulcer) in the mouth   Negative: Cold sore suspected (i.e., fever blister sore on the outer lip)   Negative: Tooth is painful or swelling around a tooth   Negative: [1] Drooling or spitting out saliva (because can't swallow) AND [2] new-onset   Negative: Electrical burn of mouth   Negative: Chemical burn of mouth   Negative: Tongue is very swollen and tender   Negative: [1] Difficulty breathing AND [2] not severe   Negative: [1] Face is swollen AND [2] fever   Negative: [1] Drinking very little AND [2] dehydration suspected (e.g., no urine > 12 hours, very dry mouth, very lightheaded)   Negative: Patient sounds very sick or weak to the triager   Negative: [1] SEVERE mouth pain (e.g., excruciating) AND [2] not improved after 2 hours of pain medicine   Negative: [1] Bloody crusts on lips or sores in mouth AND [2] " rash anywhere elese on body (back, chest, face, palms, soles)   Negative: Face is very swollen   Negative: Large lymph node (> 1 inch or 2.5 cm) under the jaw   Negative: [1] Face is swollen AND [2] no fever   Negative: Receiving chemotherapy or radiation therapy   Negative: [1] MILD-MODERATE mouth pain AND [2] present > 3 days   Negative: [1] White patches that stick to tongue or inner cheek AND [2] can be wiped off    Protocols used: Mouth Pain-A-AH

## 2025-01-05 ENCOUNTER — HEALTH MAINTENANCE LETTER (OUTPATIENT)
Age: 35
End: 2025-01-05

## 2025-01-06 ENCOUNTER — TELEPHONE (OUTPATIENT)
Dept: ADDICTION MEDICINE | Facility: CLINIC | Age: 35
End: 2025-01-06

## 2025-01-06 ENCOUNTER — VIRTUAL VISIT (OUTPATIENT)
Dept: ADDICTION MEDICINE | Facility: CLINIC | Age: 35
End: 2025-01-06
Attending: REGISTERED NURSE
Payer: MEDICARE

## 2025-01-06 ENCOUNTER — PATIENT OUTREACH (OUTPATIENT)
Dept: CARE COORDINATION | Facility: CLINIC | Age: 35
End: 2025-01-06

## 2025-01-06 ENCOUNTER — VIRTUAL VISIT (OUTPATIENT)
Dept: FAMILY MEDICINE | Facility: CLINIC | Age: 35
End: 2025-01-06
Payer: MEDICARE

## 2025-01-06 DIAGNOSIS — F25.0 SCHIZOAFFECTIVE DISORDER, BIPOLAR TYPE (H): Chronic | ICD-10-CM

## 2025-01-06 DIAGNOSIS — G89.4 CHRONIC PAIN SYNDROME: Primary | ICD-10-CM

## 2025-01-06 DIAGNOSIS — F60.3 BORDERLINE PERSONALITY DISORDER (H): ICD-10-CM

## 2025-01-06 DIAGNOSIS — F13.20 SEDATIVE, HYPNOTIC OR ANXIOLYTIC USE DISORDER, SEVERE, DEPENDENCE (H): ICD-10-CM

## 2025-01-06 DIAGNOSIS — R45.88 NONSUICIDAL SELF-INJURY (H): ICD-10-CM

## 2025-01-06 DIAGNOSIS — Z79.899 CHRONIC PRESCRIPTION BENZODIAZEPINE USE: ICD-10-CM

## 2025-01-06 DIAGNOSIS — K12.0 APHTHOUS ULCER: Primary | ICD-10-CM

## 2025-01-06 DIAGNOSIS — Z09 HOSPITAL DISCHARGE FOLLOW-UP: ICD-10-CM

## 2025-01-06 DIAGNOSIS — F43.10 PTSD (POST-TRAUMATIC STRESS DISORDER): ICD-10-CM

## 2025-01-06 PROCEDURE — 98005 SYNCH AUDIO-VIDEO EST LOW 20: CPT | Performed by: PHYSICIAN ASSISTANT

## 2025-01-06 ASSESSMENT — ANXIETY QUESTIONNAIRES
4. TROUBLE RELAXING: NEARLY EVERY DAY
GAD7 TOTAL SCORE: 14
6. BECOMING EASILY ANNOYED OR IRRITABLE: SEVERAL DAYS
7. FEELING AFRAID AS IF SOMETHING AWFUL MIGHT HAPPEN: SEVERAL DAYS
3. WORRYING TOO MUCH ABOUT DIFFERENT THINGS: NEARLY EVERY DAY
5. BEING SO RESTLESS THAT IT IS HARD TO SIT STILL: NOT AT ALL
IF YOU CHECKED OFF ANY PROBLEMS ON THIS QUESTIONNAIRE, HOW DIFFICULT HAVE THESE PROBLEMS MADE IT FOR YOU TO DO YOUR WORK, TAKE CARE OF THINGS AT HOME, OR GET ALONG WITH OTHER PEOPLE: EXTREMELY DIFFICULT
7. FEELING AFRAID AS IF SOMETHING AWFUL MIGHT HAPPEN: SEVERAL DAYS
2. NOT BEING ABLE TO STOP OR CONTROL WORRYING: NEARLY EVERY DAY
8. IF YOU CHECKED OFF ANY PROBLEMS, HOW DIFFICULT HAVE THESE MADE IT FOR YOU TO DO YOUR WORK, TAKE CARE OF THINGS AT HOME, OR GET ALONG WITH OTHER PEOPLE?: EXTREMELY DIFFICULT
GAD7 TOTAL SCORE: 14
GAD7 TOTAL SCORE: 14
1. FEELING NERVOUS, ANXIOUS, OR ON EDGE: NEARLY EVERY DAY

## 2025-01-06 ASSESSMENT — PATIENT HEALTH QUESTIONNAIRE - PHQ9
10. IF YOU CHECKED OFF ANY PROBLEMS, HOW DIFFICULT HAVE THESE PROBLEMS MADE IT FOR YOU TO DO YOUR WORK, TAKE CARE OF THINGS AT HOME, OR GET ALONG WITH OTHER PEOPLE: EXTREMELY DIFFICULT
SUM OF ALL RESPONSES TO PHQ QUESTIONS 1-9: 12
SUM OF ALL RESPONSES TO PHQ QUESTIONS 1-9: 12

## 2025-01-06 ASSESSMENT — PAIN SCALES - GENERAL: PAINLEVEL_OUTOF10: MODERATE PAIN (4)

## 2025-01-06 NOTE — PATIENT INSTRUCTIONS
Clyde Randall,    Thank you for allowing [facility] to manage your care.    I am unsure of the cause of your symptoms, but your exam is most consistent with aphthous ulcers or canker sores. Use the Cepacol lozenges for discomfort. If the whitish discoloration of your tongue persists, you may need to be treated for thrush, but this could also be your baseline.    If you develop worsening/changing symptoms at any time, please be seen in clinic/urgent care.    I sent your prescriptions to your pharmacy. Take a probiotic pill, eat live culture yogurt (Greek yogurt or Activia), drink kefir daily to encourage growth of good bacteria in your system.    If you have any questions or concerns, please feel free to call us at [phone]    Sincerely,    Adolfo Chavis PA-C    Did you know?      You can schedule a video visit for follow-up appointments as well as future appointments for certain conditions.  Please see the below link.     [email or web address]    If you have not already done so,  I encourage you to sign up for [facility] ([email or web address]).  This will allow you to review your results, securely communicate with a provider, and schedule virtual visits as well.

## 2025-01-06 NOTE — PROGRESS NOTES
Prakash is a 34 year old who is being evaluated via a billable video visit.    How would you like to obtain your AVS? MyChart  If the video visit is dropped, the invitation should be resent by: Text to cell phone: [phone]  Will anyone else be joining your video visit? No      Assessment & Plan   Problem List Items Addressed This Visit    None  Visit Diagnoses       Aphthous ulcer    -  Primary    Relevant Medications    benzocaine-menthol (CEPACOL EXTRA STRENGTH) 15-2.6 MG lozenge           Likely aphthous ulcer in context of viral URI. Appears well and non-toxic and I have low suspicion for impending airway obstruction or respiratory distress at this point.  He will push p.o. fluids, use over-the-counter meds for symptoms including Cepacol and follow-up with us in 1-2 weeks if not improving or urgent care/the ER if symptoms worsen/change at any time. May also have thrush, but difficult to discern if white color of tongue is simply from poor video quality and patient feels this may be his baseline.    DDx and Dx discussed with and explained to the pt to their satisfaction.  All questions were answered at this time. Pt expressed understanding of and agreement with this dx, tx, and plan. No further workup warranted and standard medication warnings given. I have given the patient a list of pertinent indications for re-evaluation. Will go to the Emergency Department if symptoms worsen or new concerning symptoms arise. Patient left the call in no apparent distress.        MED REC REQUIRED  Post Medication Reconciliation Status: discharge medications reconciled, continue medications without change  See Patient Instructions      Subjective   Prakash is a 34 year old, presenting for the following health issues:  Hospital F/U and Mouth Lesions        1/6/2025     3:37 PM   Additional Questions   Roomed by Tamiko CASTLE MA   Accompanied by n/a         1/6/2025     3:37 PM   Patient Reported Additional Medications   Patient  reports taking the following new medications No Medications Missing       Video Start Time: 4:48 PM    HPI    Would like to discuss canker sores on the back of his kameron. Has been having these issues for 3 days. 7 on pain scale. Recently on Augmentin for self inflicted bite.    Hospital Follow-up Visit:    Hospital/Nursing Home/IP Rehab Facility: [facility]  Date of Admission: 12/14/2024  Date of Discharge: 1/3/2025  Reason(s) for Admission: high BP's, diabetes management, and has stitches s/p self cutting with prophylactic abx regimen, foot pain.   Was the patient in the ICU or did the patient experience delirium during hospitalization?  No    Summary of hospitalization:  [facility] hospital discharge summary reviewed  Diagnostic Tests/Treatments reviewed.  Follow up needed: addiction medicine  Other Healthcare Providers Involved in Patient s Care:         Specialist appointment - as above, psychiatry and therapy  Update since discharge: improved. Doing well on Belbuca for chronic pain. Canker sores on and underneath tongue and around a few teeth. Unsure if discoloration of tongue is new or baseline. No bleeding, exudate, changes in taste.     Plan of care communicated with patient       Review of Systems  Constitutional, HEENT, cardiovascular, pulmonary, gi and gu systems are negative, except as otherwise noted.      Objective    Vitals - Patient Reported  Pain Score: Severe Pain (7)  Pain Loc: Other - see comment (mouth)      Vitals:  No vitals were obtained today due to virtual visit.    Physical Exam   GENERAL: alert and no distress  EYES: Eyes grossly normal to inspection.  No discharge or erythema, or obvious scleral/conjunctival abnormalities.  HENT: a few isolated aphthous ulcers present along right tip of tongue. Mild white discoloration to tongue.  RESP: No audible wheeze, cough, or visible cyanosis.    SKIN: Visible skin clear. No significant rash,  abnormal pigmentation or lesions.  NEURO: Cranial nerves grossly intact.  Mentation and speech appropriate for age.  PSYCH: Appropriate affect, tone, and pace of words      Video-Visit Details    Type of service:  Video Visit   Video End Time:5:58 PM  Originating Location (pt. Location): Home  Distant Location (provider location):  On-site  Platform used for Video Visit: Doximity  Signed Electronically by: VASILE Rosen

## 2025-01-06 NOTE — PROGRESS NOTES
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 management.   Confirms access to narcan  - Buprenorphine HCl (BELBUCA) 150 MCG FILM buccal film; Place 1 Film (150 mcg) inside cheek every 12 hours.  Dispense: 16 Film; Refill: 0    2. Sedative, hypnotic or anxiolytic use disorder, severe, dependence (H)  Patient meets criteria for severe gabapentin use disorder. He began overusing gabapentin, taking up to 24,000 mg daily. He was working with his PCP to taper off of gabapentin when he as admitted to hospital, gabapentin taper was completed during his hospitalization.   He has been prescribed clonazepam 0.5 mg which was changed to lorazepam 0.5 mg BID prn during hospitalization with recommendations to taper off, especially if planning to continue belbuca long term. Patient expressed disire to taper off of BDZ once stable.  Reviewed risks of concurrent use of multiple CNS depressants and encouraged his plans to work with psychiatry to taper.   He is scheduled with Becki Sommers and persistent depression clinic.     3. Major depressive disorder, recurrent severe without psychotic features (H)  4. Nonsuicidal self-injury (H)  Denies SI, or SIB urges. Is taking Abilify ER Aripiprazole ER IM imitated during hospital stay administered on 1/3/2025, Depakote, lorazepam, clonidine, hydroxyzine, and  sertraline. Is scheduled at [facility] clinic 1/13 and  treatment resistant clinic 1/28.   Community supports include art therapy, individual therapy, ICS, Arms worker.   - Adult Mental Health  Referral                ENCOUNTER FOR LONG TERM USE OF HIGH RISK MEDICATION   High Risk Drug Monitoring?  YES   Drug being monitored: Belbuca   Reason for drug: Chronic pain   What is being monitored?: Dosage, Cravings, Triggers and side effects        Continued Complex Management  The longitudinal plan of care for Sedative Use Disorder and Chronic pain was addressed during this visit. Due to the added complexity in care, I will continue to support Prakash in the subsequent management and with ongoing continuity of care.      Counseled the patient on the importance of having a recovery program in addition to medication to manage recovery.  Components include avoiding isolating, having willingness to change, avoiding triggers and managing cravings. Encouraged having some type of sober network and practicing honesty with trusted support person(s). Encouraged other services such as counseling, 12 step or other self-help organizations.            RTC   1 week            Lucie Fiore, CNP  [facility]  [phone]

## 2025-01-06 NOTE — NURSING NOTE
Current patient location: [address] [place] [id number]    Is the patient currently in the state of [place]? YES    Visit mode:VIDEO    If the visit is dropped, the patient can be reconnected by:VIDEO VISIT: Text to cell phone:   Telephone Information:   Mobile [phone]    and VIDEO VISIT: Send to e-mail at: [email or web address]    Will anyone else be joining the visit? NO  (If patient encounters technical issues they should call [phone]    Are changes needed to the allergy or medication list? No    Are refills needed on medications prescribed by this physician? NO    Rooming Documentation:  Questionnaire(s) completed    Reason for visit: Consult    Rosemary VANN

## 2025-01-06 NOTE — PROGRESS NOTES
[facility]    Background: Transitional Care Management program identified per system criteria and reviewed by [facility] Resource [place] team for possible outreach.    Assessment: Upon chart review, [facility] Team member will not proceed with patient outreach related to this episode of Transitional Care Management program due to reason below:    Patient has a follow up appointment with an appropriate provider today for hospital discharge    Plan: Transitional Care Management episode addressed appropriately per reason noted above.      RAYMUNDO Pappas  [facility] [place]cted [place] Resource Team does NOT follow patient ongoing. Referrals are identified based on internal discharge reports and the outreach is to ensure patient has an understanding of their discharge instructions.

## 2025-01-06 NOTE — TELEPHONE ENCOUNTER
Pt contacted [facility] @ 8:15 requesting to change to a virtual visit.    Made a phone call to Prakash. Pt is sick and asking to switch to a video.   After reviewing his record, provider Ok'd a video visit. Pt is aware and agreed with a plan to complete a video visit today; most likely he will be provided with a week supply of medications until he comes in for in person visit.

## 2025-01-07 ENCOUNTER — MYC MEDICAL ADVICE (OUTPATIENT)
Dept: FAMILY MEDICINE | Facility: CLINIC | Age: 35
End: 2025-01-07
Payer: MEDICARE

## 2025-01-07 DIAGNOSIS — F17.290 VAPING NICOTINE DEPENDENCE, TOBACCO PRODUCT: Primary | ICD-10-CM

## 2025-01-10 ENCOUNTER — HOSPITAL ENCOUNTER (EMERGENCY)
Facility: CLINIC | Age: 35
Discharge: HOME | End: 2025-01-10
Attending: EMERGENCY MEDICINE | Admitting: EMERGENCY MEDICINE
Payer: MEDICARE

## 2025-01-10 ENCOUNTER — APPOINTMENT (OUTPATIENT)
Dept: CT IMAGING | Facility: CLINIC | Age: 35
End: 2025-01-10
Attending: EMERGENCY MEDICINE
Payer: MEDICARE

## 2025-01-10 VITALS
OXYGEN SATURATION: 98 % | TEMPERATURE: 97.8 F | BODY MASS INDEX: 35.89 KG/M2 | RESPIRATION RATE: 18 BRPM | WEIGHT: 225.75 LBS | HEART RATE: 62 BPM | DIASTOLIC BLOOD PRESSURE: 95 MMHG | SYSTOLIC BLOOD PRESSURE: 128 MMHG

## 2025-01-10 DIAGNOSIS — R10.9 LEFT FLANK PAIN: ICD-10-CM

## 2025-01-10 LAB
ALBUMIN UR-MCNC: 10 MG/DL
ANION GAP SERPL CALCULATED.3IONS-SCNC: 14 MMOL/L (ref 7–15)
APPEARANCE UR: CLEAR
BASOPHILS # BLD AUTO: 0.1 10E3/UL (ref 0–0.2)
BASOPHILS NFR BLD AUTO: 1 %
BILIRUB UR QL STRIP: NEGATIVE
BUN SERPL-MCNC: 15.7 MG/DL (ref 6–20)
CALCIUM SERPL-MCNC: 9.3 MG/DL (ref 8.8–10.4)
CHLORIDE SERPL-SCNC: 104 MMOL/L (ref 98–107)
COLOR UR AUTO: YELLOW
CREAT SERPL-MCNC: 0.7 MG/DL (ref 0.51–1.17)
EGFRCR SERPLBLD CKD-EPI 2021: >90 ML/MIN/1.73M2
EOSINOPHIL # BLD AUTO: 0.2 10E3/UL (ref 0–0.7)
EOSINOPHIL NFR BLD AUTO: 2 %
ERYTHROCYTE [DISTWIDTH] IN BLOOD BY AUTOMATED COUNT: 14 % (ref 10–15)
GLUCOSE SERPL-MCNC: 117 MG/DL (ref 70–99)
GLUCOSE UR STRIP-MCNC: 300 MG/DL
HCG UR QL: NEGATIVE
HCO3 SERPL-SCNC: 22 MMOL/L (ref 22–29)
HCT VFR BLD AUTO: 45.2 % (ref 35–53)
HGB BLD-MCNC: 15.7 G/DL (ref 11.7–17.7)
HGB UR QL STRIP: NEGATIVE
HOLD SPECIMEN: NORMAL
HOLD SPECIMEN: NORMAL
IMM GRANULOCYTES # BLD: 0 10E3/UL
IMM GRANULOCYTES NFR BLD: 0 %
KETONES UR STRIP-MCNC: NEGATIVE MG/DL
LEUKOCYTE ESTERASE UR QL STRIP: NEGATIVE
LYMPHOCYTES # BLD AUTO: 3.4 10E3/UL (ref 0.8–5.3)
LYMPHOCYTES NFR BLD AUTO: 37 %
MCH RBC QN AUTO: 28.8 PG (ref 26.5–33)
MCHC RBC AUTO-ENTMCNC: 34.7 G/DL (ref 31.5–36.5)
MCV RBC AUTO: 83 FL (ref 78–100)
MONOCYTES # BLD AUTO: 0.6 10E3/UL (ref 0–1.3)
MONOCYTES NFR BLD AUTO: 7 %
MUCOUS THREADS #/AREA URNS LPF: PRESENT /LPF
NEUTROPHILS # BLD AUTO: 5 10E3/UL (ref 1.6–8.3)
NEUTROPHILS NFR BLD AUTO: 54 %
NITRATE UR QL: NEGATIVE
NRBC # BLD AUTO: 0 10E3/UL
NRBC BLD AUTO-RTO: 0 /100
PH UR STRIP: 6.5 [PH] (ref 5–7)
PLATELET # BLD AUTO: 287 10E3/UL (ref 150–450)
POTASSIUM SERPL-SCNC: 4.1 MMOL/L (ref 3.4–5.3)
RBC # BLD AUTO: 5.45 10E6/UL (ref 3.8–5.9)
RBC URINE: 4 /HPF
SODIUM SERPL-SCNC: 140 MMOL/L (ref 135–145)
SP GR UR STRIP: 1.03 (ref 1–1.03)
UROBILINOGEN UR STRIP-MCNC: NORMAL MG/DL
WBC # BLD AUTO: 9.3 10E3/UL (ref 4–11)
WBC URINE: 2 /HPF

## 2025-01-10 PROCEDURE — 250N000009 HC RX 250: Performed by: EMERGENCY MEDICINE

## 2025-01-10 PROCEDURE — 250N000011 HC RX IP 250 OP 636: Performed by: EMERGENCY MEDICINE

## 2025-01-10 PROCEDURE — 80048 BASIC METABOLIC PNL TOTAL CA: CPT | Performed by: EMERGENCY MEDICINE

## 2025-01-10 PROCEDURE — 85014 HEMATOCRIT: CPT | Performed by: EMERGENCY MEDICINE

## 2025-01-10 PROCEDURE — 81025 URINE PREGNANCY TEST: CPT | Performed by: EMERGENCY MEDICINE

## 2025-01-10 PROCEDURE — 85004 AUTOMATED DIFF WBC COUNT: CPT | Performed by: EMERGENCY MEDICINE

## 2025-01-10 PROCEDURE — 96374 THER/PROPH/DIAG INJ IV PUSH: CPT | Mod: 59

## 2025-01-10 PROCEDURE — 99285 EMERGENCY DEPT VISIT HI MDM: CPT | Mod: 25

## 2025-01-10 PROCEDURE — 81003 URINALYSIS AUTO W/O SCOPE: CPT | Performed by: EMERGENCY MEDICINE

## 2025-01-10 PROCEDURE — 250N000013 HC RX MED GY IP 250 OP 250 PS 637: Performed by: EMERGENCY MEDICINE

## 2025-01-10 PROCEDURE — 96375 TX/PRO/DX INJ NEW DRUG ADDON: CPT

## 2025-01-10 PROCEDURE — 36415 COLL VENOUS BLD VENIPUNCTURE: CPT | Performed by: EMERGENCY MEDICINE

## 2025-01-10 PROCEDURE — 74177 CT ABD & PELVIS W/CONTRAST: CPT

## 2025-01-10 PROCEDURE — 82435 ASSAY OF BLOOD CHLORIDE: CPT | Performed by: EMERGENCY MEDICINE

## 2025-01-10 PROCEDURE — 51798 US URINE CAPACITY MEASURE: CPT

## 2025-01-10 RX ORDER — IOPAMIDOL 755 MG/ML
500 INJECTION, SOLUTION INTRAVASCULAR ONCE
Status: COMPLETED | OUTPATIENT
Start: 2025-01-10 | End: 2025-01-10

## 2025-01-10 RX ORDER — HYDROMORPHONE HYDROCHLORIDE 1 MG/ML
0.5 INJECTION, SOLUTION INTRAMUSCULAR; INTRAVENOUS; SUBCUTANEOUS ONCE
Status: COMPLETED | OUTPATIENT
Start: 2025-01-10 | End: 2025-01-10

## 2025-01-10 RX ORDER — OXYCODONE HYDROCHLORIDE 5 MG/1
5 TABLET ORAL ONCE
Status: COMPLETED | OUTPATIENT
Start: 2025-01-10 | End: 2025-01-10

## 2025-01-10 RX ORDER — KETOROLAC TROMETHAMINE 15 MG/ML
15 INJECTION, SOLUTION INTRAMUSCULAR; INTRAVENOUS ONCE
Status: COMPLETED | OUTPATIENT
Start: 2025-01-10 | End: 2025-01-10

## 2025-01-10 RX ORDER — ACETAMINOPHEN 500 MG
1000 TABLET ORAL ONCE
Status: COMPLETED | OUTPATIENT
Start: 2025-01-10 | End: 2025-01-10

## 2025-01-10 RX ADMIN — HYDROMORPHONE HYDROCHLORIDE 0.5 MG: 1 INJECTION, SOLUTION INTRAMUSCULAR; INTRAVENOUS; SUBCUTANEOUS at 20:30

## 2025-01-10 RX ADMIN — OXYCODONE HYDROCHLORIDE 5 MG: 5 TABLET ORAL at 22:59

## 2025-01-10 RX ADMIN — IOPAMIDOL 100 ML: 755 INJECTION, SOLUTION INTRAVENOUS at 20:40

## 2025-01-10 RX ADMIN — KETOROLAC TROMETHAMINE 15 MG: 15 INJECTION, SOLUTION INTRAMUSCULAR; INTRAVENOUS at 19:29

## 2025-01-10 RX ADMIN — ACETAMINOPHEN 1000 MG: 500 TABLET, FILM COATED ORAL at 22:59

## 2025-01-10 RX ADMIN — SODIUM CHLORIDE 65 ML: 9 INJECTION, SOLUTION INTRAVENOUS at 20:40

## 2025-01-10 ASSESSMENT — ACTIVITIES OF DAILY LIVING (ADL)
ADLS_ACUITY_SCORE: 58

## 2025-01-10 NOTE — ED PROVIDER NOTES
Emergency Department Note      History of Present Illness     Chief Complaint   Flank Pain and Urinary Retention      HPI   Prakash Prasad is a 34 year old adult with a history of hypertension, DM2, nephrolithiasis, neurogenic bladder, and more who presents to the ED for flank pain and urinary retention. The patient reports experiencing urinary retention and flank pain for the past couple of days. His symptoms got worse today. He last empted his bladder completely yesterday. He states he has still been drinking fluids today, just has a small amount of urinary output. He has a history of similar symptoms since a spinal cord injury, and last had a catheter placed last year. He states that after this, his symptoms were alleviated. Patient further reports feeling hot for the past couple of days, but has not had any measured fevers as he does not have a thermometer at home. He denies any vomiting, dysuria, or feeling of his bladder being full.     Independent Historian   None    Review of External Notes   Reviewed virtual visit from earlier today.     Past Medical History     Medical History and Problem List   Heroin abuse  Cannabis use disorder  Opioid use disorder  Optic neuritis  Polysubstance abuse  Substance-induced psychotic disorder with hallucinations  Dysmenorrhea  Acne vulgaris  ADHD  Akathisia  Anxiety  Bipolar affective disorder  Cauda equina syndrome with neurogenic bladder  Morbid obesity  Depression  GERD  Herpes labialis  Hypertension  Lumbosacral radiculopathy  Nonsuicidal self-injury  Drug overdose of undetermined intent  PTSD  Schizoaffective disorder  Suicidal ideation  Tobacco abuse  DM2  Nephrolithiasis   Borderline personality disorder  TBI  Marginal corneal ulcer, left    Medications   Tylenol  Norvasc  Abilify  Biotene  Catapres  Voltaren  Jardiance  Atarax  Advil/motrin  Ativan  Nicorette  Prilosec  Zofran  Crestor  Rybelsus  Zoloft  Valtrex  Depakene     Surgical History   Past Surgical  History:   Procedure Laterality Date    BACK SURGERY  12/24/2016    BACK SURGERY - For Cauda Equina Syndrome  12/24/2016    COLONOSCOPY      COMBINED CYSTOSCOPY, INSERT STENT URETER(S) Left 8/30/2018    Procedure: COMBINED CYSTOSCOPY, INSERT STENT URETER(S);  Cystoscopy With Left Stent Placement;  Surgeon: Kiran Ulrich MD;  Location: [facility] OR    COMBINED CYSTOSCOPY, RETROGRADES, EXCHANGE STENT URETER(S) Left 10/14/2018    Procedure: COMBINED CYSTOSCOPY, RETROGRADES, EXCHANGE STENT URETER(S);  Cystoscopy and removal of left-sided stent.;  Surgeon: Stiven Olivo MD;  Location:  OR    COMBINED CYSTOSCOPY, RETROGRADES, URETEROSCOPY, INSERT STENT  1/6/2014    Procedure: COMBINED CYSTOSCOPY, RETROGRADES, URETEROSCOPY, INSERT STENT;  Cystyoscopy place left ureteral stent;  Surgeon: Jaun Kimble MD;  Location: [facility] OR    COMBINED CYSTOSCOPY, RETROGRADES, URETEROSCOPY, INSERT STENT Left 10/23/2018    Procedure: Video cystoscopy, left ureteroscopy with stone extraction;  Surgeon: Bull Mast MD;  Location:  OR    CYSTOSCOPY, URETEROSCOPY, COMBINED Right 9/23/2015    Procedure: COMBINED CYSTOSCOPY, URETEROSCOPY;  Surgeon: ROME Jett MD;  Location: [facility] OR    ENT SURGERY      ESOPHAGOSCOPY, GASTROSCOPY, DUODENOSCOPY (EGD), COMBINED  4/8/2013    Procedure: COMBINED ESOPHAGOSCOPY, GASTROSCOPY, DUODENOSCOPY (EGD), BIOPSY SINGLE OR MULTIPLE;  Gastroscopy;  Surgeon: Peter Pickard MD;  Location: [facility] GI    ESOPHAGOSCOPY, GASTROSCOPY, DUODENOSCOPY (EGD), COMBINED Left 10/28/2014    Procedure: COMBINED ESOPHAGOSCOPY, GASTROSCOPY, DUODENOSCOPY (EGD), BIOPSY SINGLE OR MULTIPLE;  Surgeon: Narcisa Ramirez MD;  Location:  OR    ESOPHAGOSCOPY, GASTROSCOPY, DUODENOSCOPY (EGD), COMBINED N/A 12/24/2018    Procedure: COMBINED ESOPHAGOSCOPY, GASTROSCOPY, DUODENOSCOPY (EGD), BIOPSY SINGLE OR MULTIPLE;  Surgeon: Sonu Verduzco MD;  Location: [facility]    INJECT EPIDURAL TRANSFORAMINAL LUMBAR / SACRAL EA  ADDITIONAL LEVEL Left 3/18/2021    Procedure: Left L5/S1 transforaminal injection for selective L5 nerve root block;  Surgeon: Eliza Pagan MD;  Location: [facility] OR    LAPAROSCOPIC CHOLECYSTECTOMY  11/20/2014    [facility], Dr. Ramirez    LASER HOLMIUM LITHOTRIPSY URETER(S), INSERT STENT, COMBINED  4/2/2014    Procedure: COMBINED CYSTOSCOPY, URETEROSCOPY, LASER HOLMIUM LITHOTRIPSY URETER(S), INSERT STENT;  Cystoscopy,Left Ureteral Stent Removal,Left Ureteroscopy with Laser Lithotripsy,Left Ureter Stent Placement;  Surgeon: ROME Jett MD;  Location: [facility] OR    Transurethral stone resection  03/11/2011       Physical Exam     Patient Vitals for the past 24 hrs:   BP Temp Temp src Pulse Resp SpO2 Weight   01/10/25 1755 120/76 -- -- 76 18 96 % --   01/10/25 1613 (!) 159/99 98.4  F (36.9  C) Temporal 87 18 97 % 102.4 kg (225 lb 12 oz)     Physical Exam  Nursing note and vitals reviewed.  HENT:   Mouth/Throat: Moist mucous membranes.   Eyes: EOMI, nonicteric sclera  Cardiovascular: Normal rate, regular rhythm, no murmurs, rubs, or gallops  Pulmonary/Chest: Effort normal and breath sounds normal. No respiratory distress. No wheezes. No rales.   Abdominal: Soft. Nontender, nondistended, no guarding or rigidity. Pain to palpation left flank with +CVA tenderness.  Musculoskeletal: Normal range of motion.   Neurological: Alert. Moves all extremities spontaneously.   Skin: Skin is warm and dry. No rash noted.         Diagnostics     Lab Results   Labs Ordered and Resulted from Time of ED Arrival to Time of ED Departure   ROUTINE UA WITH MICROSCOPIC REFLEX TO CULTURE - Abnormal       Result Value    Color Urine Yellow      Appearance Urine Clear      Glucose Urine 300 (*)     Bilirubin Urine Negative      Ketones Urine Negative      Specific Gravity Urine 1.029      Blood Urine Negative      pH Urine 6.5      Protein Albumin Urine 10 (*)     Urobilinogen Urine Normal      Nitrite Urine Negative      Leukocyte  Esterase Urine Negative      Mucus Urine Present (*)     RBC Urine 4 (*)     WBC Urine 2     BASIC METABOLIC PANEL - Abnormal    Sodium 140      Potassium 4.1      Chloride 104      Carbon Dioxide (CO2) 22      Anion Gap 14      Urea Nitrogen 15.7      Creatinine 0.70      GFR Estimate >90      Calcium 9.3      Glucose 117 (*)    HCG QUALITATIVE URINE - Normal    hCG Urine Qualitative Negative     CBC WITH PLATELETS AND DIFFERENTIAL    WBC Count 9.3      RBC Count 5.45      Hemoglobin 15.7      Hematocrit 45.2      MCV 83      MCH 28.8      MCHC 34.7      RDW 14.0      Platelet Count 287      % Neutrophils 54      % Lymphocytes 37      % Monocytes 7      % Eosinophils 2      % Basophils 1      % Immature Granulocytes 0      NRBCs per 100 WBC 0      Absolute Neutrophils 5.0      Absolute Lymphocytes 3.4      Absolute Monocytes 0.6      Absolute Eosinophils 0.2      Absolute Basophils 0.1      Absolute Immature Granulocytes 0.0      Absolute NRBCs 0.0         Imaging   CT Abdomen Pelvis w Contrast   Final Result   IMPRESSION:    1.  Stable, nonobstructing bilateral renal calculi.   2.  Hepatic steatosis.        Independent Interpretation   None      ED Course      Medications Administered   Medications   ketorolac (TORADOL) injection 15 mg (15 mg Intravenous $Given 1/10/25 1929)   HYDROmorphone (PF) (DILAUDID) injection 0.5 mg (0.5 mg Intravenous $Given 1/10/25 2030)   CT Scan Flush (65 mLs Intravenous $Given 1/10/25 2040)   iopamidol (ISOVUE-370) solution 500 mL (100 mLs Intravenous $Given 1/10/25 2040)   oxyCODONE (ROXICODONE) tablet 5 mg (5 mg Oral $Given 1/10/25 2259)   acetaminophen (TYLENOL) tablet 1,000 mg (1,000 mg Oral $Given 1/10/25 2259)       Procedures   Procedures     Discussion of Management   None    ED Course   ED Course as of 01/10/25 1908   Fri Troy 10, 2025   1711 I obtained history and examined the patient as noted above.     1904 I rechecked the patient.       Additional  Documentation  None    Medical Decision Making / Diagnosis     CMS Diagnoses: None    MIPS       None    [facility]   Prakash Prasad is a 34 year old adult who presents with chief complaint urinary retention and left flank pain.  Patient with history of neurogenic bladder and reports feeling like his bladder is incompletely emptying.  Bladder scan x 2 <300.  Urinalysis is not suggestive of infection.  With history of kidney stones, CT obtained which is also negative for emergent etiology.  With history of neurogenic bladder, certainly considered cauda equina syndrome, however patient does have history of similar complaints in the past and he did have MRI performed 2 weeks ago which did not show any spinal cord or cauda equina impingement, but did show disc herniation with contact of left S1 nerve root.  Pain may be musculoskeletal in nature given history of back pain, though patient discounts this possibility. With reasonable clinical certainty I believe no further emergent workup indicated and pt is safe for discharge home with pcp follow-up. He is in stable condition at the time of discharge, red flags that should merit ED return were discussed as well as recommended follow-up instructions. All questions were answered and he is in agreement with the plan.      Disposition   The patient was discharged.     Diagnosis     ICD-10-CM    1. Left flank pain  R10.9              Scribe Disclosure:  I, Molly Brar, am serving as a scribe at 5:35 PM on 1/10/2025 to document services personally performed by Bro Prince MD based on my observations and the provider's statements to me.        Bro Prince MD  01/12/25 2420

## 2025-01-10 NOTE — ED TRIAGE NOTES
Presents to triage with c/o L flank pain and urinary retention that has been ongoing for several days. Patient states he has intermittent urinary retention since having a spinal cord injury. Has been dribbling urine but unable to fully void. Patient had a virtual visit this morning and was told to go to the ED.

## 2025-01-13 ENCOUNTER — VIRTUAL VISIT (OUTPATIENT)
Dept: BEHAVIORAL HEALTH | Facility: CLINIC | Age: 35
End: 2025-01-13
Payer: MEDICARE

## 2025-01-13 VITALS — HEIGHT: 67 IN | BODY MASS INDEX: 35.89 KG/M2

## 2025-01-13 DIAGNOSIS — F60.3 BORDERLINE PERSONALITY DISORDER (H): ICD-10-CM

## 2025-01-13 DIAGNOSIS — F99 INSOMNIA DUE TO OTHER MENTAL DISORDER: ICD-10-CM

## 2025-01-13 DIAGNOSIS — F17.290 VAPING NICOTINE DEPENDENCE, TOBACCO PRODUCT: ICD-10-CM

## 2025-01-13 DIAGNOSIS — F19.10 POLYSUBSTANCE ABUSE (H): ICD-10-CM

## 2025-01-13 DIAGNOSIS — F39 MOOD DISORDER: Primary | ICD-10-CM

## 2025-01-13 DIAGNOSIS — F51.05 INSOMNIA DUE TO OTHER MENTAL DISORDER: ICD-10-CM

## 2025-01-13 DIAGNOSIS — F43.10 PTSD (POST-TRAUMATIC STRESS DISORDER): ICD-10-CM

## 2025-01-13 RX ORDER — HYDROXYZINE HYDROCHLORIDE 50 MG/1
75 TABLET, FILM COATED ORAL AT BEDTIME
Qty: 45 TABLET | Refills: 0 | Status: SHIPPED | OUTPATIENT
Start: 2025-01-13

## 2025-01-13 ASSESSMENT — PATIENT HEALTH QUESTIONNAIRE - PHQ9
SUM OF ALL RESPONSES TO PHQ QUESTIONS 1-9: 19
SUM OF ALL RESPONSES TO PHQ QUESTIONS 1-9: 19
10. IF YOU CHECKED OFF ANY PROBLEMS, HOW DIFFICULT HAVE THESE PROBLEMS MADE IT FOR YOU TO DO YOUR WORK, TAKE CARE OF THINGS AT HOME, OR GET ALONG WITH OTHER PEOPLE: EXTREMELY DIFFICULT

## 2025-01-13 ASSESSMENT — PAIN SCALES - GENERAL: PAINLEVEL_OUTOF10: SEVERE PAIN (6)
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
## 2025-01-13 NOTE — PATIENT INSTRUCTIONS
Continue:  Abilify Maintena 400 mg q 28 days; due on 1/24/2025    Clonidine/Catapres .1 mg twice daily as needed for anxiety, hot flashes    Hydroxyzine/Atarax:  -25 mg three times daily  as needed for anxiety, sleeping    +    -75 mg (1.5 tabs) at bedtime    Lorazepam .5 mg twice daily  as needed for anxiety    Melatonin 3 mg nightly as needed for sleep    Nicotine 4 mg gum    Sertraline/Zoloft 50 mg daily    Valproic Acid 250 mg/5 mL: 500 mg (10 mL) three times daily;   TDD = 1500 mg        -----------------------      -If there are questions/inquiries between now and the next appointment OR prior to transferring to the next level of care, please call (see below).    Clinic hours/phone number at the Transition Clinic:   -Monday to Friday from 8:00 am to 4:30 pm  [phone]    -----------------------    Call:  -[phone]) if guidance is needed after T.C. clinic hours about utilizing [facility] versus the Emergency Departments    ----------------------    Any time (during or after regular clinic hours) immediate and or life threatening symptoms occur (either medical or mental health), call:  [phone] and or go to the nearest Emergency Department      ---------------------    Please use the following websites to obtain a comprehensive list of all counties within the state of [place] for adult and child mental health crisis numbers:    https://[place].gov/dhs/people-we-serve/people-with-disabilities/health-care/adult-mental-health/resources/crisis-contacts.jsp    https://[place]/dhs/people-we-serve/people-with-disabilities/health-care/childrens-mental-health/resources/crisis-contacts.jsp      -------------------      Below is a list of county (also found on the above websites), state, and national crisis numbers.   These resources can provide real-time assistance if experiencing moderate to severe mental health symptoms.        Additionally, please visit your county website to find the most  up-to-date list of local:  adult, child, family, and chemical dependency services available.        [facility]  [phone]    [facility]  [phone]    [facility]  [phone]    [facility]  [phone]    [facility]  [phone]: Adults 18 and over    [phone]: Children 17 and under    [facility] Acute Psychiatric Services (APS) Assessment & Referral:   [phone]    Community Outreach for Psychiatric Emergencies (COPE):  [phone]    [facility]  [phone]: Adults 18 and over    [phone]: Children 17 and under      Walk-in crisis services are available Monday to Friday from 8 am to 5:30 pm at Urgent Care for  Adult metal health:    [address] [place] [address]  Closed on Saturday, Sunday, and holidays    [place]  [phone]: Toll free    [facility]  [phone]      [facility] [place]  [phone]: Toll free    Text: [place] to 283642      [facility] and human services  Call 211 or visit https://www.[address].org to obtain free and confidential h/hs information     [facility]  If you are an adult needing support, you can talk to a specialist who has first hand experience living with a mental health condition:    [phone]    Text: Support to 39990    [facility] [place]:  domestic violence/LGBTQ+  [phone] option 3    [employer]: LGBTQ+  4-911-431-2632    Text: START to 698865     Resources  1-398-YfezCwr: (1-703.416.1472)    Crisis line: 1-559.906.4938    Text: 309803    [employer]: women and sexually exploited youth  922.532.2356    [employer] Domestic Violence Shelter Hotline  452.229.7840    National Suicide Prevention Lifeline  980    National Youth Crisis Hotline  273

## 2025-01-13 NOTE — NURSING NOTE
Current patient location: [address] [place] [address]   [facility] [address]    Is the patient currently in the state of [place]? YES    Visit mode: VIDEO    If the visit is dropped, the patient can be reconnected by:VIDEO VISIT: Text to cell phone:   Telephone Information:   Mobile [phone]       Will anyone else be joining the visit? NO  (If patient encounters technical issues they should call [phone]    Are changes needed to the allergy or medication list? No    Are refills needed on medications prescribed by this physician? NO    Rooming Documentation:  Questionnaire(s) completed Attendance guidelines (MH&A only)    Reason for visit: Consult    Yuli VANN

## 2025-01-13 NOTE — Clinical Note
Allen Bellamy:  -Prakash GISBON will be due for DOMINGUEZ (if he attends the 1/31/2025 appointment in [place], [place]) on 2/28/2025 and then q 28 days thereafter; I only ordered 1 dose for 1/31/2025.  -Please let me know if there's additional questions or concerns related to the progress notes.  Becki

## 2025-01-15 NOTE — PROGRESS NOTES
"VIDEO VISIT  Ayana Prasad is a 31 year old patient who is being evaluated via a billable video visit.      The patient has been notified of following:   \"This video visit will be conducted via a call between you and your physician/provider. We have found that certain health care needs can be provided without the need for an in-person physical exam. This service lets us provide the care you need with a video conversation. If a prescription is necessary we can send it directly to your pharmacy. If lab work is needed we can place an order for that and you can then stop by our lab to have the test done at a later time. Insurers are generally covering virtual visits as they would in-office visits so billing should not be different than normal.  If for some reason you do get billed incorrectly, you should contact the billing office to correct it and that number is in the AVS .    Video Conference to be completed via:  Freddy    Patient has given verbal consent for video visit?:  Yes    Patient would prefer that any video invitations be sent by: Send to e-mail at: [email or web address]      How would patient like to obtain AVS?:  [employer]    AVS SmartPhrase [PsychAVS] has been placed in 'Patient Instructions':  Yes    " Lisinopril 10 mg tab      Last Written Prescription Date:  3-28-24  Last Fill Quantity: 90,   # refills: 1  Last Office Visit: 1-6-25  Future Office visit:    Next 5 appointments (look out 90 days)      Apr 09, 2025 9:30 AM  (Arrive by 9:15 AM)  Provider Visit with Brittney Coe NP  [facility] ([facility] ) [address]  [facility] [address]  [phone]

## 2025-01-15 NOTE — PROGRESS NOTES
"    OF NOTE: Prakash scored 16 on PHQ 9, endorsing suicidality. Declined nurse triage today. Working with a therapist, seeing them once a week. Currently feels safe. Denies any active plans to hurt himself or end his life. Crisis resources provided.           *** minutes spent by me on the date of the encounter doing chart review, history and exam, documentation and further activities per the note    New Medical Weight Management Consult    PATIENT:  Prakash Prasad  MRN:         [id number]  :         1990  GIO:         2025    Dear PCP,    I had the pleasure of seeing your patient, Prakash Prasad. Full intake/assessment was done to determine barriers to weight loss success and develop a treatment plan. Prakash Prasad is a 34 year old adult interested in treatment of medical problems associated with excess weight. He has a height of 5' 6.5\", a weight of 225 lbs 0 oz, and the calculated Body mass index is 35.77 kg/m .            Assessment & Plan   Problem List Items Addressed This Visit       Class 2 obesity due to excess calories without serious comorbidity with body mass index (BMI) of 36.0 to 36.9 in adult    Relevant Orders    Adult Sleep Evaluation & Management  Referral        Plan  No medications started per Prakash's preference- very briefly discussed ozempic and naltrexone, however Prakash is not interested in injectable medications at this time   Referral placed to sleep medicine given Prakash's concern for chronic fatigue, snoring.   Goals we discussed today:   Tracking all food prior to meeting with dietician   MTM pharmacy appointment declined by Prakash today, could consider in the future to help with type 2 dm, reviewing medications   Follow up with OFELIA in 3 months   Dietician appointment to be rescheduled asap         Potential anti-obesity medications for this patient the future  OZEMPIC  Breifly discussed, did not go into detail. Prakash was not interested in injectable " medications, declined learning more about this medication. Could revisit in the future as he'd likely be an excellent candidate for these medications if he changes his feelings around injectable medications.   Has diagnosis of type 2 diabetes mellitus -last A1c was 7.4   No history of pancreatitis   No personal or family history of medullary thyroid carcinoma  No personal or family history of Multiple Endocrine Neoplasia Type 2  .  NALTREXONE  No history of liver disease  Caution is needed with this medication due to chronic pain, needing opioids in the past for chronic pain. Could consider with caution   .        Contraindicated/Failed Weight loss medications for this patient   PHENTERMINE  Contraindicated due to schizoaffective disorder   .  TOPIRAMATE  Has taken in the past , did not see successful weight loss. Also relatively contraindicated due to notable history of kidney stones that have required surgical removal   .  BUPROPION  Has taken in the past , saw notably worsened mood   .  METFORMIN  Has taken in the past , saw intolerable nausea and diarrhea   .        Prakash Prasad is a 34 year old adult who presents to clinic today for the following health issues.     He has the following co-morbidities:        1/16/2025    10:38 AM   --   I have the following health issues associated with obesity Type II Diabetes    High Blood Pressure    High Cholesterol    GERD (Reflux)    Fatty Liver   I have the following symptoms associated with obesity None of the above           2/18/2021     3:43 PM   Patient Goals   I am interested in having a healthier weight to diminish current health problems: Yes   I am interested in having a healthier weight in order to prevent future health problems: Yes   I am interested in having a healthier weight in order to have a future surgery: Yes   If yes, please indicate which surgery? Top Surgery           1/16/2025    10:38 AM   Referring Provider   Please name the provider who  "referred you to Medical Weight Management  If you do not know, please answer \"I Don't Know\" Becki Avery       Overweight onset in his early 20s. Does not recall his weight age 18, but does not recall feeling concerned about it. Saw weight gain with increased eating of less nutritious foods, psych medication adjustments (stopping and restarting stimulant medications for adhd). Experienced a spinal cord injury age 26 which impeded his ability to move, further weight gain with this.      Weight gain has overall been gradual.   Met with Dr. Hays 2/19/21- advised stopping seroquel at that time, was not able to do it at that time due to mental health concerns. Did not follow up with Dr. Hays after this.      Highest weight in life was 240lbs, has since lost down to 225lbs, though he denies any intentional changes to his lifestyle that would contribute to this weight loss.     Comorbidities associated with weight gain include type 2 diabetes, HTN, hypertrigylceridemia.   Additional health issues include schizoaffective disorder, bipolar disorder, history of nephrolithiasis, some residual issues with cauda equina (pain, occasional urinary retention), history of vaping (using niccotine gum), history of gabapentin use. He is also a transgender male, and takes testosterone for this.    Per chart, there is a history of heroin abuse however Prakash denies ever using heroin in his life. Also per chart has history of dysmenorrhea, Prakash denies this, states he hasn't menstruated in a few years.       Current type 2 dm:   Jardiance-10mg   Rybelsus- 7mg   Is not checking his blood sugar regularly     Motivators for weight loss include improving health, feeling better overall.     He is interested in starting a medication as a tool for working towards sustainable weight loss.    Regarding eating patterns and diet, he generally snacks throughout the day, mostly at night, doesn't have the desire right now to have full meals. " Craves sugar . Is able to get full. Can stay full until next meal. In the past would struggle with portion control, but feels this has improved over the last 2 weeks, though he isn't sure why. Does not experience food noise. Does at times experience emotional eating (boredom). Does not experience a loss of control around eating .    Eats out/ gets take out less than once a month. Drinks water throughout the day. Coffee with creamer at times, not lately. No pop. No juice. No ETOH.     Regarding activity, is currently on disability. Has a lot of hobbies and interested but struggles to engage in these lately due to worsened mental health. Likes drawing, coloring, charcoil, pastels. Likes reading. Has a dog named Odoo (formerly [employer]). Used to play hockey and tennis, ran track and field. Liked these things.       Regarding sleep- struggles to sleep due to chronic pain. Is getting a mattress delivered today so hoping his sleep will improve. Estimates getting 4 hours per night, plus naps during the day that will last  minutes. Does snore, has never had a sleep study.     Past  AOMs   Wellbutrin- notably worsened mood, crying   Metformin- stopped due to nausea, diarrhea   Topiramate- was not helpful for appetite supression, has been several years since he tried this. Cannot recall adverse side effects             1/16/2025    10:38 AM   Weight History   How concerned are you about your weight? Very Concerned   I became overweight As an Adult   The following factors have contributed to my weight gain Mental Health Issues    Started on Medication that Caused Weight Gain    A Health Crisis    Eating Too Much    Lack of Exercise    Stress   I have tried the following methods to lose weight Watching Portions or Calories    Exercise    Medications    Fasting   My highest weight since age 18 was 240   I have the following family history of obesity/being overweight I am the only one in my immediate family who is overweight            1/16/2025    10:38 AM   Diet Recall Review with Patient   How often do you have a drink of alcohol? Never           1/16/2025    10:38 AM   Eating Habits   Generally, my meals include foods like these bread, pasta, rice, potatoes, corn, crackers, sweet dessert, pop, or juice Less Than Weekly   Generally, my meals include foods like these fried meats, brats, burgers, french fries, pizza, cheese, chips, or ice cream Less Than Weekly   Eat fast food (like [employer] [employer], [employer], [employer]) Never   Eat at a buffet or sit-down restaurant Never   Eat most of my meals in front of the TV or computer Never   Often skip meals, eat at random times, have no regular eating times Everyday   Rarely sit down for a meal but snack or graze throughout Never   Eat extra snacks between meals Never   Eat most of my food at the end of the day Almost Everyday   Eat in the middle of the night or wake up at night to eat Never   Eat extra snacks to prevent or correct low blood sugar Never   Eat to prevent acid reflux or stomach pain Never   Worry about not having enough food to eat Never   I eat when I am depressed Never   I eat when I am stressed Never   I eat when I am bored Never   I eat when I am anxious Never   I eat when I am happy or as a reward Never   I feel hungry all the time even if I just have eaten Never   Feeling full is important to me Never   I finish all the food on my plate even if I am already full Never   I can't resist eating delicious food or walk past the good food/smell Never   I eat/snack without noticing that I am eating Never   I eat when I am preparing the meal Never   I eat more than usual when I see others eating Never   I have trouble not eating sweets, ice cream, cookies, or chips if they are around the house Never   I think about food all day Never   What foods, if any, do you crave? None           1/16/2025    10:38 AM   Amount of Food   I feel out of control when eating Never   I eat a large amount of food,  like a loaf of bread, a box of cookies, a pint/quart of ice cream, all at once Never   I eat a large amount of food even when I am not hungry Never   I eat rapidly Never   I eat alone because I feel embarrassed and do not want others to see how much I have eaten Never   I eat until I am uncomfortably full Never   I feel bad, disgusted, or guilty after I overeat Never           1/16/2025    10:38 AM   Activity/Exercise History   How much of a typical 12 hour day do you spend sitting? Half the Day   How much of a typical 12 hour day do you spend lying down? Less Than Half the Day   How much of a typical day do you spend walking/standing? Less Than Half the Day   How many hours (not including work) do you spend on the TV/Video Games/Computer/Tablet/Phone? 6 Hours or More   How many times a week are you active for the purpose of exercise? Once a Week   What keeps you from being more active? Pain    Other   How many total minutes do you spend doing some activity for the purpose of exercising when you exercise? Less Than 15 Minutes       PAST MEDICAL HISTORY:  Past Medical History:   Diagnosis Date    ADHD (attention deficit hyperactivity disorder)     Bipolar 1 disorder     Borderline personality disorder     Cauda equina syndrome     Chronic low back pain     Depression     Diabetes mellitus, type 2 (H) 1/19/2023    GERD (gastroesophageal reflux disease)     h/o TBI (traumatic brain injury)     Hypertension, unspecified type 12/16/2021    Marginal corneal ulcer, left 07/17/2015    Nephrolithiasis     obesity     Polysubstance abuse - methamphetamine, hallucinagen, heroin, marijuana     currently in remission    PONV (postoperative nausea and vomiting)     PTSD (post-traumatic stress disorder)            1/16/2025    10:38 AM   Work/Social History Reviewed With Patient   My employment status is Disabled       Marijuana use - medical marijuana, not using lately but previously using every day. Does endorse increase in  appetite when using   Alcohol use - denies   Caffeine use  - coffee occasionally             1/16/2025    10:38 AM   Mental Health History Reviewed With Patient   How often in the past 2 weeks have you felt little interest or pleasure in doing things? More Than Half the Days   Over the past 2 weeks how often have you felt down, depressed, or hopeless? More Than Half the Days             2/18/2021     3:43 PM   Sleep History Reviewed With Patient   How many hours do you sleep at night? 12   Do you think that you snore loudly or has anybody ever heard you snore loudly (louder than talking or so loud it can be heard behind a shut door)? No   Has anyone seen or heard you stop breathing during your sleep? No   Do you often feel tired, fatigued, or sleepy during the day? No   Do you have a TV/Computer in your bedroom? Yes         MEDICATIONS:   Current Outpatient Medications   Medication Sig Dispense Refill    acetaminophen (TYLENOL) 325 MG tablet Take 2 tablets (650 mg) by mouth 3 times daily as needed for mild pain (to moderate pain). 180 tablet 0    amLODIPine (NORVASC) 5 MG tablet Take 1 tablet (5 mg) by mouth daily.      artificial saliva (BIOTENE MT) SOLN solution Swish and spit 1 spray in mouth 4 times daily as needed for dry mouth. 44.3 mL 0    blood glucose (NO BRAND SPECIFIED) test strip Use to test blood sugar one times daily and as needed. To accompany: Blood Glucose Monitor Brands: per insurance.      clindamycin phos-benzoyl perox (DUAC) 1.2-5 % external gel Apply topically daily.      cloNIDine (CATAPRES) 0.1 MG tablet Take 1 tablet (0.1 mg) by mouth 2 times daily as needed (anxiety, hot flashes). 60 tablet 0    diclofenac (VOLTAREN) 1 % topical gel Apply 2 g topically 4 times daily. 350 g 0    empagliflozin (JARDIANCE) 10 MG TABS tablet Take 1 tablet (10 mg) by mouth daily.      hydrOXYzine HCl (ATARAX) 25 MG tablet Take 1 tablet (25 mg) by mouth 3 times daily as needed for anxiety (For anxiety and  difficulty sleeping.). 90 tablet 0    hydrOXYzine HCl (ATARAX) 50 MG tablet Take 1.5 tablets (75 mg) by mouth at bedtime. 45 tablet 0    ibuprofen (ADVIL/MOTRIN) 400 MG tablet Take 1 tablet (400 mg) by mouth 3 times daily as needed for inflammatory pain. 90 tablet 0    insulin pen needle (32G X 4 MM) 32G X 4 MM miscellaneous Use 1 pen needles daily or as directed.      LORazepam (ATIVAN) 0.5 MG tablet Take 1 tablet (0.5 mg) by mouth 2 times daily as needed for anxiety. Do not take concurrently with Belbuca. 60 tablet 0    melatonin 3 MG tablet Take 1 tablet (3 mg) by mouth nightly as needed for sleep. 30 tablet 0    menthol (ICY HOT) 5 % PTCH Apply 1 patch over 8 hours topically 3 times daily as needed for muscle soreness or moderate pain. 90 patch 0    nicotine polacrilex (NICORETTE) 4 MG gum Place 1 each (4 mg) inside cheek every hour as needed for nicotine withdrawal symptoms. 100 each 0    omeprazole (PRILOSEC) 20 MG DR capsule Take 1 capsule (20 mg) by mouth daily.      ondansetron (ZOFRAN ODT) 4 MG ODT tab Take 1 tablet (4 mg) by mouth every 8 hours as needed for nausea.      rosuvastatin (CRESTOR) 40 MG tablet Take 1 tablet (40 mg) by mouth daily.      Semaglutide (RYBELSUS) 7 MG tablet Take 1 tablet (7 mg) by mouth daily.      Semaglutide (RYBELSUS) 7 MG tablet Take 1 tablet (7 mg) by mouth daily.      sertraline (ZOLOFT) 50 MG tablet Take 1 tablet (50 mg) by mouth daily. 30 tablet 0    testosterone (ANDROGEL/TESTIM) 50 MG/5GM (1%) topical gel Place 2 packets (100 mg of testosterone) onto the skin daily.      thin (NO BRAND SPECIFIED) lancets Use with lanceting device to check blood sugars once per day. To accompany: Blood Glucose Monitor Brands: per insurance.      valACYclovir (VALTREX) 500 MG tablet Take 1 tablet (500 mg) by mouth daily.      valproic acid (DEPAKENE) 250 MG/5ML syrup Take 10 mLs (500 mg) by mouth 3 times daily. 946 mL 0           ALLERGIES:   Allergies   Allergen Reactions    Haldol  "[Haloperidol] Other (See Comments)     Makes patient very angry and anxious    Adhesive Tape Hives    Prednisone Other (See Comments) and Hives     Suicidal ideation    Droperidol Anxiety           4/15/2013     4:00 PM 1/16/2025    10:26 AM   CATIE Score (Last Two)   CATIE Raw Score 52 29    Activation Score 100 52.9    CATIE Level 4 2        Patient-reported         {Diagnostic Test Results (Optional):976803}      Objective    Ht 1.689 m (5' 6.5\")   Wt 102.1 kg (225 lb)   BMI 35.77 kg/m    Vitals - Patient Reported  Pain Score: Mild Pain (3)  Pain Loc: Low Back      Vitals:  No vitals were obtained today due to virtual visit.    Physical Exam   GENERAL: alert and no distress  EYES: Eyes grossly normal to inspection.  No discharge or erythema, or obvious scleral/conjunctival abnormalities.  RESP: No audible wheeze, cough, or visible cyanosis.    SKIN: Visible skin clear. No significant rash, abnormal pigmentation or lesions.  NEURO: Cranial nerves grossly intact.  Mentation and speech appropriate for age.  PSYCH: Appropriate affect, tone, and pace of words     Anti-obesity medication ROS:    HEENT  Hx of glaucoma: No    Cardiovascular  CAD:No  HTN:Yes      Gastrointestinal  GERD:Yes - well managed with omeprazole 20mg daily   Constipation:No  Liver Dz:No  H/O Pancreatitis:No    Psychiatric  Bipolar: Yes  Anxiety:Yes  Depression:Yes  History of alcohol/drug abuse: Yes  Hx of eating disorder:No    Endocrine  Personal or family hx of MTC or MEN2:No  Diabetes/prediabetes: Yes    Neurologic:  Hx of seizures: No  Hx of migraines: No  Memory Impairment: No  CVA history: No      History of kidney stones: Yes several, has needed them surgically removed in the past   Kidney disease: Yes ckd stage 2   Current birth control: not sexually active, taking testosterone, has not menstruated for a few years   Taking Opioid/Narcotic:  in the past for cauda-equina related pain, not currently         Sincerely,    Shanice Veliz PA-C "     The longitudinal plan of care for the diagnosis(es)/condition(s) as documented were addressed during this visit. Due to the added complexity in care, I will continue to support *** in the subsequent management and with ongoing continuity of care.

## 2025-01-16 ENCOUNTER — TELEPHONE (OUTPATIENT)
Dept: ENDOCRINOLOGY | Facility: CLINIC | Age: 35
End: 2025-01-16

## 2025-01-16 ENCOUNTER — TELEPHONE (OUTPATIENT)
Dept: GASTROENTEROLOGY | Facility: CLINIC | Age: 35
End: 2025-01-16

## 2025-01-16 ENCOUNTER — VIRTUAL VISIT (OUTPATIENT)
Dept: ENDOCRINOLOGY | Facility: CLINIC | Age: 35
End: 2025-01-16
Attending: NURSE PRACTITIONER
Payer: MEDICARE

## 2025-01-16 ENCOUNTER — HOSPITAL ENCOUNTER (EMERGENCY)
Facility: CLINIC | Age: 35
Discharge: ANOTHER HOSPITAL | End: 2025-01-16
Attending: FAMILY MEDICINE | Admitting: FAMILY MEDICINE
Payer: MEDICARE

## 2025-01-16 VITALS — HEIGHT: 67 IN | BODY MASS INDEX: 35.31 KG/M2 | WEIGHT: 225 LBS

## 2025-01-16 VITALS
OXYGEN SATURATION: 97 % | TEMPERATURE: 98 F | HEART RATE: 70 BPM | SYSTOLIC BLOOD PRESSURE: 114 MMHG | DIASTOLIC BLOOD PRESSURE: 76 MMHG | RESPIRATION RATE: 16 BRPM

## 2025-01-16 DIAGNOSIS — Z59.89 DISTRESSED ABOUT HOUSING ISSUES: ICD-10-CM

## 2025-01-16 DIAGNOSIS — E66.09 CLASS 2 OBESITY DUE TO EXCESS CALORIES WITHOUT SERIOUS COMORBIDITY WITH BODY MASS INDEX (BMI) OF 36.0 TO 36.9 IN ADULT: ICD-10-CM

## 2025-01-16 DIAGNOSIS — F43.10 PTSD (POST-TRAUMATIC STRESS DISORDER): ICD-10-CM

## 2025-01-16 DIAGNOSIS — E66.812 CLASS 2 OBESITY DUE TO EXCESS CALORIES WITHOUT SERIOUS COMORBIDITY WITH BODY MASS INDEX (BMI) OF 36.0 TO 36.9 IN ADULT: ICD-10-CM

## 2025-01-16 DIAGNOSIS — F60.3 BORDERLINE PERSONALITY DISORDER (H): ICD-10-CM

## 2025-01-16 DIAGNOSIS — F25.0 SCHIZOAFFECTIVE DISORDER, BIPOLAR TYPE (H): Chronic | ICD-10-CM

## 2025-01-16 PROCEDURE — 99283 EMERGENCY DEPT VISIT LOW MDM: CPT | Performed by: FAMILY MEDICINE

## 2025-01-16 PROCEDURE — 99284 EMERGENCY DEPT VISIT MOD MDM: CPT | Performed by: FAMILY MEDICINE

## 2025-01-16 RX ORDER — OLANZAPINE 5 MG/1
5 TABLET, ORALLY DISINTEGRATING ORAL AT BEDTIME
Qty: 30 TABLET | Refills: 0 | Status: SHIPPED | OUTPATIENT
Start: 2025-01-16

## 2025-01-16 SDOH — ECONOMIC STABILITY - INCOME SECURITY: OTHER PROBLEMS RELATED TO HOUSING AND ECONOMIC CIRCUMSTANCES: Z59.89

## 2025-01-16 ASSESSMENT — SLEEP AND FATIGUE QUESTIONNAIRES
HOW LIKELY ARE YOU TO NOD OFF OR FALL ASLEEP IN A CAR, WHILE STOPPED FOR A FEW MINUTES IN TRAFFIC: WOULD NEVER DOZE
HOW LIKELY ARE YOU TO NOD OFF OR FALL ASLEEP WHILE SITTING QUIETLY AFTER LUNCH WITHOUT ALCOHOL: WOULD NEVER DOZE
HOW LIKELY ARE YOU TO NOD OFF OR FALL ASLEEP WHILE SITTING AND TALKING TO SOMEONE: WOULD NEVER DOZE
HOW LIKELY ARE YOU TO NOD OFF OR FALL ASLEEP WHILE SITTING INACTIVE IN A PUBLIC PLACE: WOULD NEVER DOZE
HOW LIKELY ARE YOU TO NOD OFF OR FALL ASLEEP WHILE SITTING AND READING: WOULD NEVER DOZE
HOW LIKELY ARE YOU TO NOD OFF OR FALL ASLEEP WHEN YOU ARE A PASSENGER IN A CAR FOR AN HOUR WITHOUT A BREAK: WOULD NEVER DOZE
HOW LIKELY ARE YOU TO NOD OFF OR FALL ASLEEP WHILE WATCHING TV: WOULD NEVER DOZE
HOW LIKELY ARE YOU TO NOD OFF OR FALL ASLEEP WHILE LYING DOWN TO REST IN THE AFTERNOON WHEN CIRCUMSTANCES PERMIT: WOULD NEVER DOZE

## 2025-01-16 ASSESSMENT — ACTIVITIES OF DAILY LIVING (ADL): ADLS_ACUITY_SCORE: 58

## 2025-01-16 ASSESSMENT — COLUMBIA-SUICIDE SEVERITY RATING SCALE - C-SSRS
2. HAVE YOU ACTUALLY HAD ANY THOUGHTS OF KILLING YOURSELF IN THE PAST MONTH?: NO
6. HAVE YOU EVER DONE ANYTHING, STARTED TO DO ANYTHING, OR PREPARED TO DO ANYTHING TO END YOUR LIFE?: YES
1. IN THE PAST MONTH, HAVE YOU WISHED YOU WERE DEAD OR WISHED YOU COULD GO TO SLEEP AND NOT WAKE UP?: NO

## 2025-01-16 ASSESSMENT — PATIENT HEALTH QUESTIONNAIRE - PHQ9: SUM OF ALL RESPONSES TO PHQ QUESTIONS 1-9: 16

## 2025-01-16 ASSESSMENT — PAIN SCALES - GENERAL: PAINLEVEL_OUTOF10: MILD PAIN (3)

## 2025-01-16 NOTE — TELEPHONE ENCOUNTER
"Attempted to contact patient in order to complete pre assessment questions.     No answer. Left message to return call to [phone] option 2.    Callback communication sent via [employer].      Procedure details:    Patient scheduled for Upper endoscopy (EGD) on 1/29/25.     Arrival time: 1430. Procedure time 1530    Facility location: [facility]; [facility] [place] [address]. Check in location: 1st [facility].     Sedation type: MAC - per order: \"History of chemical dependency, history of schizophrenia with psychosis.\"     Pre op exam needed? Yes. Patient needs to complete an in person pre-operative exam within 30 days of procedure. Informed patient pre op is needed via voicemail. and [employer]    Indication for procedure: Family history of esophageal cancer, Gastroesophageal reflux disease with esophagitis without hemorrhage      Chart review:     Electronic implanted devices? No    Recent diagnosis of diverticulitis within the last 6 weeks? N/A      Medication review:    Diabetic? Yes. Oral diabetic medications: Jardiance (empagliflozin): HOLD  3 days before procedure.  Rybelsus (Semaglutide). Daily or BID dosing of medication.  HOLD the day before and day of procedure.  Follow up with managing provider.     Anticoagulants? No    Weight loss medication/injectable? No. Patient is on GLP-1 medication but for DM (see above).    Other medication HOLDING recommendations:  EGD: If taking Sucralfate (Carafate): HOLD 1 day before procedure. (Listed on external med list)  If using Cannabis/Marijuana: Stop night before procedure.      Prep for procedure:     Bowel prep recommendation: N/A  Due to:  EGD    Procedure information and instructions sent via [employer].       Lauryn Robles RN  Endoscopy Procedure Pre-Assessment RN  [phone], option 2  "

## 2025-01-16 NOTE — ED TRIAGE NOTES
Patient states they need some meds for the weekend until they see their new psych provider on Monday.     Requesting a new medications to manage symptoms of anxiety and insomnia.

## 2025-01-16 NOTE — PROGRESS NOTES
Virtual Visit Details    Type of service:  Video Visit   Video Start Time:  11:55am  Video End Time: 12:39pm    Originating Location (pt. Location): Home  {PROVIDER LOCATION On-site should be selected for visits conducted from your clinic location or adjoining [facility] hospital, academic office, or other nearby [facility] building. Off-site should be selected for all other provider locations, including home:[id number]}  Distant Location (provider location):  Off-site  Platform used for Video Visit: [employer]

## 2025-01-16 NOTE — NURSING NOTE
Current patient location: home     Is the patient currently in the state of [place]? YES    Visit mode: VIDEO    If the visit is dropped, the patient can be reconnected by:VIDEO VISIT: Text to cell phone:   Telephone Information:   Mobile [phone]       Will anyone else be joining the visit? NO  (If patient encounters technical issues they should call [phone] :[phone])    Are changes needed to the allergy or medication list? Pt stated no changes to allergies and Pt stated no med changes    Are refills needed on medications prescribed by this physician? NO    Rooming Documentation:  Questionnaire(s) completed      Reason for visit: Consult    Wt other than 24 hrs:  1/10  Pain more than one location:  no  Kavitha Prater VVF     High phq2    Depression Response    Patient completed the PHQ-9 assessment for depression and scored >9? Yes  Question 9 on the PHQ-9 was positive for suicidality? Yes  Does patient have current mental health provider? Yes    Is this a virtual visit? Yes   Does patient have suicidal ideation (positive question 9)? Yes (adult) - transfer to Red Flag Triage ([phone]) Patient declined transfer.  Notify provider.     I personally notified the following: visit provider

## 2025-01-16 NOTE — ED PROVIDER NOTES
[facility] EMERGENCY DEPARTMENT ([facility])    1/16/25      ED PROVIDER NOTE       History     Chief Complaint   Patient presents with    Anxiety     HPI  Prakash Prasad is a 34 year old adult with a history of Schizoaffective disorder bipolar type, Depression, polysubstance abuse PTSD, type 2 DM, HTN, and borderline personality disorder who presents to the ED with increased anxiety and insomnia.  Patient notes that the main stressor for him is that he found out that he is going to be losing his housing in the next 30 days and is having a difficult time sleeping feeling anxious and is requesting prescription for Zyprexa at this time to help with anxiety depression and increased symptoms.  Patient denies any thoughts of suicide homicide or self injury and is requesting medication in order to follow-up with primary MD.    Past Medical History  Past Medical History:   Diagnosis Date    ADHD (attention deficit hyperactivity disorder)     Bipolar 1 disorder     Borderline personality disorder     Cauda equina syndrome     Chronic low back pain     Depression     Diabetes mellitus, type 2 (H) 1/19/2023    GERD (gastroesophageal reflux disease)     h/o TBI (traumatic brain injury)     Hypertension, unspecified type 12/16/2021    Marginal corneal ulcer, left 07/17/2015    Nephrolithiasis     obesity     Polysubstance abuse - methamphetamine, hallucinagen, heroin, marijuana     currently in remission    PONV (postoperative nausea and vomiting)     PTSD (post-traumatic stress disorder)      Past Surgical History:   Procedure Laterality Date    BACK SURGERY  12/24/2016    BACK SURGERY - For Cauda Equina Syndrome  12/24/2016    COLONOSCOPY      COMBINED CYSTOSCOPY, INSERT STENT URETER(S) Left 8/30/2018    Procedure: COMBINED CYSTOSCOPY, INSERT STENT URETER(S);  Cystoscopy With Left Stent Placement;  Surgeon: Kiran Ulrich MD;  Location: [facility] OR    COMBINED CYSTOSCOPY, RETROGRADES, EXCHANGE STENT URETER(S)  Left 10/14/2018    Procedure: COMBINED CYSTOSCOPY, RETROGRADES, EXCHANGE STENT URETER(S);  Cystoscopy and removal of left-sided stent.;  Surgeon: Stiven Olivo MD;  Location:  OR    COMBINED CYSTOSCOPY, RETROGRADES, URETEROSCOPY, INSERT STENT  1/6/2014    Procedure: COMBINED CYSTOSCOPY, RETROGRADES, URETEROSCOPY, INSERT STENT;  Cystyoscopy place left ureteral stent;  Surgeon: Jaun Kimble MD;  Location: [facility] OR    COMBINED CYSTOSCOPY, RETROGRADES, URETEROSCOPY, INSERT STENT Left 10/23/2018    Procedure: Video cystoscopy, left ureteroscopy with stone extraction;  Surgeon: Bull Mast MD;  Location:  OR    CYSTOSCOPY, URETEROSCOPY, COMBINED Right 9/23/2015    Procedure: COMBINED CYSTOSCOPY, URETEROSCOPY;  Surgeon: ROME Jett MD;  Location: [facility] OR    ENT SURGERY      ESOPHAGOSCOPY, GASTROSCOPY, DUODENOSCOPY (EGD), COMBINED  4/8/2013    Procedure: COMBINED ESOPHAGOSCOPY, GASTROSCOPY, DUODENOSCOPY (EGD), BIOPSY SINGLE OR MULTIPLE;  Gastroscopy;  Surgeon: Peter Pickard MD;  Location: [facility] GI    ESOPHAGOSCOPY, GASTROSCOPY, DUODENOSCOPY (EGD), COMBINED Left 10/28/2014    Procedure: COMBINED ESOPHAGOSCOPY, GASTROSCOPY, DUODENOSCOPY (EGD), BIOPSY SINGLE OR MULTIPLE;  Surgeon: Narcisa Ramirez MD;  Location:  OR    ESOPHAGOSCOPY, GASTROSCOPY, DUODENOSCOPY (EGD), COMBINED N/A 12/24/2018    Procedure: COMBINED ESOPHAGOSCOPY, GASTROSCOPY, DUODENOSCOPY (EGD), BIOPSY SINGLE OR MULTIPLE;  Surgeon: Sonu Verduzco MD;  Location: [facility] GI    INJECT EPIDURAL TRANSFORAMINAL LUMBAR / SACRAL EA ADDITIONAL LEVEL Left 3/18/2021    Procedure: Left L5/S1 transforaminal injection for selective L5 nerve root block;  Surgeon: Eliza Pagan MD;  Location: [facility] OR    LAPAROSCOPIC CHOLECYSTECTOMY  11/20/2014    [facility], Dr. Ramirez    LASER HOLMIUM LITHOTRIPSY URETER(S), INSERT STENT, COMBINED  4/2/2014    Procedure: COMBINED CYSTOSCOPY, URETEROSCOPY, LASER HOLMIUM LITHOTRIPSY URETER(S), INSERT STENT;   Cystoscopy,Left Ureteral Stent Removal,Left Ureteroscopy with Laser Lithotripsy,Left Ureter Stent Placement;  Surgeon: ROME Jett MD;  Location: [facility] OR    Transurethral stone resection  03/11/2011     OLANZapine zydis (ZYPREXA) 5 MG ODT  acetaminophen (TYLENOL) 325 MG tablet  amLODIPine (NORVASC) 5 MG tablet  artificial saliva (BIOTENE MT) SOLN solution  blood glucose (NO BRAND SPECIFIED) test strip  clindamycin phos-benzoyl perox (DUAC) 1.2-5 % external gel  cloNIDine (CATAPRES) 0.1 MG tablet  diclofenac (VOLTAREN) 1 % topical gel  empagliflozin (JARDIANCE) 10 MG TABS tablet  hydrOXYzine HCl (ATARAX) 25 MG tablet  hydrOXYzine HCl (ATARAX) 50 MG tablet  ibuprofen (ADVIL/MOTRIN) 400 MG tablet  insulin pen needle (32G X 4 MM) 32G X 4 MM miscellaneous  LORazepam (ATIVAN) 0.5 MG tablet  melatonin 3 MG tablet  menthol (ICY HOT) 5 % PTCH  nicotine polacrilex (NICORETTE) 4 MG gum  omeprazole (PRILOSEC) 20 MG DR capsule  ondansetron (ZOFRAN ODT) 4 MG ODT tab  rosuvastatin (CRESTOR) 40 MG tablet  Semaglutide (RYBELSUS) 7 MG tablet  Semaglutide (RYBELSUS) 7 MG tablet  sertraline (ZOLOFT) 50 MG tablet  testosterone (ANDROGEL/TESTIM) 50 MG/5GM (1%) topical gel  thin (NO BRAND SPECIFIED) lancets  valACYclovir (VALTREX) 500 MG tablet  valproic acid (DEPAKENE) 250 MG/5ML syrup      Allergies   Allergen Reactions    Haldol [Haloperidol] Other (See Comments)     Makes patient very angry and anxious    Adhesive Tape Hives    Prednisone Other (See Comments) and Hives     Suicidal ideation    Droperidol Anxiety     Family History  Family History   Problem Relation Age of Onset    Hyperlipidemia Mother     Mental Illness Mother     Anxiety Disorder Mother     Anesthesia Reaction Mother         Vomiting/Nausea    Other Cancer Mother     Skin Cancer Mother     Gastrointestinal Disease Father         Crohn's Disease    Cancer Father         Liver Cancer    Other Cancer Father         Liver    Obesity Father     Skin Cancer Father      "No Known Problems Sister     Hypertension Brother     Other Cancer Brother         Esophagecial    Diabetes Brother     Obesity Brother     Hypertension Brother     Other Cancer Brother     Cancer Maternal Grandmother         lympoma    Diabetes Maternal Grandmother     Mental Illness Maternal Grandmother     Other Cancer Maternal Grandmother         Non Hodgkins Lymphoma    Diabetes Maternal Grandfather     Hypertension Maternal Grandfather     Prostate Cancer Maternal Grandfather     Hyperlipidemia Maternal Grandfather     Heart Disease Paternal Grandfather         heart disease    Other Cancer Paternal Half-Brother      Social History   Social History     Tobacco Use    Smoking status: Former     Current packs/day: 0.00     Average packs/day: 0.5 packs/day for 11.1 years (5.6 ttl pk-yrs)     Types: Other, Cigarettes     Start date: 2011     Quit date: 2022     Years since quittin.3     Passive exposure: Never    Smokeless tobacco: Former     Types: Chew     Quit date: 2019    Tobacco comments:     Just nicotine gum currently. 23   Vaping Use    Vaping status: Former    Substances: Nicotine, THC, Flavoring    Devices: Disposable   Substance Use Topics    Alcohol use: Yes    Drug use: Not Currently     Types: Marijuana, Opiates     Comment: denies using oxy or other opiates_\"not for years\"      A medically appropriate review of systems was performed with pertinent positives and negatives noted in the HPI, and all other systems negative.    Physical Exam   BP: 112/76  Pulse: 71  Temp: 98  F (36.7  C)  Resp: 16  SpO2: 97 %  Physical Exam  Constitutional:       General: He is not in acute distress.     Appearance: Normal appearance. He is not diaphoretic.   HENT:      Head: Atraumatic.      Mouth/Throat:      Mouth: Mucous membranes are moist.   Eyes:      General: No scleral icterus.     Conjunctiva/sclera: Conjunctivae normal.   Cardiovascular:      Rate and Rhythm: Normal rate.      Heart " sounds: Normal heart sounds.   Pulmonary:      Effort: No respiratory distress.      Breath sounds: Normal breath sounds.   Abdominal:      General: Abdomen is flat.   Musculoskeletal:      Cervical back: Neck supple.   Skin:     General: Skin is warm.      Findings: No rash.   Neurological:      General: No focal deficit present.      Mental Status: He is alert and oriented to person, place, and time.      Sensory: No sensory deficit.      Motor: No weakness.      Coordination: Coordination normal.   Psychiatric:         Mood and Affect: Mood is anxious.         Speech: Speech normal.         Behavior: Behavior is cooperative.         Thought Content: Thought content does not include homicidal or suicidal ideation.           ED Course, Procedures, & Data      Procedures         No results found for any visits on 01/16/25.  Medications - No data to display  Labs Ordered and Resulted from Time of ED Arrival to Time of ED Departure - No data to display  No orders to display          Critical care was not performed.     Medical Decision Making  The patient's presentation was of moderate complexity (a chronic illness mild to moderate exacerbation, progression, or side effect of treatment).    The patient's evaluation involved:  history and exam without other MDM data elements    The patient's management necessitated moderate risk (prescription drug management including medications given in the ED).    Assessment & Plan        I have reviewed the nursing notes. I have reviewed the findings, diagnosis, plan and need for follow up with the patient.    Discharge Medication List as of 1/16/2025  5:23 PM        START taking these medications    Details   OLANZapine zydis (ZYPREXA) 5 MG ODT Take 1 tablet (5 mg) by mouth at bedtime., Disp-30 tablet, R-0, [facility]             Final diagnoses:   PTSD (post-traumatic stress disorder)   Schizoaffective disorder, bipolar type (H)   Distressed about housing issues   Borderline  personality disorder (H)         [facility] EMERGENCY DEPARTMENT  1/16/2025     Hector Mendoza MD  01/17/25 2005

## 2025-01-16 NOTE — TELEPHONE ENCOUNTER
Left Voicemail (1st Attempt) and Sent Mychart (1st Attempt) for the patient to call back and reschedule the following:    Appointment type: New Med WT MGMT Nutrition  Appointment mode: Virtual Visit  Previously scheduled with: Sahara Morin  Reschedule with: Viji Hewitt or Kristin Fajardo  Date: 3/11/25  Specialty phone number: [phone]    Additonal Notes:  LVM/MyC x1

## 2025-01-16 NOTE — PROGRESS NOTES
11/16/23 1500    Group Therapy Session   Time Session Began 1425   Time Session Ended 1455   Total Time (minutes) 30   Total # Attendees 4   Group Type expressive therapy   Group Topic Covered balanced lifestyle;coping skills/lifestyle management   Group Session Detail Mindfulness   Patient Response/Contribution cooperative with task   Patient Participation Detail Cooperatively engaged in Music Mindfulness group to decrease anxiety and promote relaxation.  Neutral affect, appropriately engaged in session, responding well to the music.  Sat away from peers, but was calm throughout session.       Hello -    Here are my comments about the recent results.  Your chest x-ray is negative for pneumonia    Please let us know if you have any questions or concerns.    Regards,  Evelyn Armas PA-C

## 2025-01-16 NOTE — PATIENT INSTRUCTIONS
"Thank you for allowing us the privilege of caring for you. We hope we provided you with the excellent service you deserve.   Please let us know if there is anything else we can do for you so that we can be sure you are completely satisfied with your care experience.    To ensure the quality of our services you may be receiving a patient satisfaction survey from an independent patient satisfaction monitoring company.    The greatest compliment you can give is a \"Likely to Recommend\"    Your visit was with Shanice Veliz PA-C today.    Instructions per today's visit:     Jarad Prasad, it was great to visit with you today.  Here is a review of our visit.  If our clinic scheduler is not able to reach you please call [phone] to schedule your next appointments.    Plan  No medications started per Prakash's preference- very briefly discussed ozempic and naltrexone, however Prakash is not interested in injectable medications at this time   Referral placed to sleep medicine given Prakash's concern for chronic fatigue, snoring.   Goals we discussed today:   Tracking all food prior to meeting with dietician   [place] pharmacy appointment declined by Prakash today, could consider in the future to help with type 2 dm, reviewing medications   Follow up with OFELIA in 3 months   Dietician appointment to be rescheduled asap      Information about Video Visits with [facility]: video visit information  _________________________________________________________________________________________________________________________________________________________  If you are asked by your clinic team to have your blood pressure checked:  [facility] do offer several locations for blood pressure checks. Please follow the below link to schedule an appointment. Scheduling an appointment at the pharmacy for a blood pressure check is now preferred.    Appointment Plus " ([email or web address])  _________________________________________________________________________________________________________________________________________________________  Important contact and scheduling information:  Please call our contact center at [phone] to schedule your next appointments.  To find a lab location near you, please call [phone].  For any nursing questions or concerns call Yaquelin Lopez LPN at [phone] or Ashlyn Mack RN at [phone]  Please call during clinic hours Monday through Friday 8:00a - 4:00p if you have questions or you can contact us via [employer] at anytime and we will reply during clinic hours.    Lab results will be communicated through My Chart or letter (if My Chart not used). Please call the clinic if you have not received communication after 1 week or if you have any questions.?  Clinic Fax: [phone]    _Interested in working with a health ?  Health coaches work with you to improve your overall health and wellbeing.  They look at the whole person, and may involve discussion of different areas of life, including, but not limited to the four pillars of health (sleep, exercise, nutrition, and stress management). Discuss with your care team if you would like to start working a health .  Health Coaching-3 Pack: Schedule by calling [phone]    $99 for three health coaching visits    Visits may be done in person or via phone    Coaching is a partnership between the  and the client; Coaches do not prescribe or diagnose    Coaching helps inspire the client to reach his/her personal goals   _________________________________________________________________________________________________________________________________________________________  __________  [facility] Get Moving Program  Our team of physical therapists is trained to help you understand and take control of your condition. They will perform a thorough  evaluation to determine your ability for activity and develop a customized plan to fit your goals and physical ability.  Scheduling: Unsure if the Get Moving program is right for you? Discuss the program with your medical provider or diabetes educator. You can also call us at [phone] to ask questions or schedule an appointment.   [facility] Get Moving Program  ____________________________________________________________________________________________________________________________________________________________________________        Thank you,   [facility] Comprehensive Weight Management Team

## 2025-01-16 NOTE — LETTER
"2025       RE: Prakash Prasad  [address]  [facility] [address]     Dear Colleague,    Thank you for referring your patient, Prakash Prasad, to the [facility]. Please see a copy of my visit note below.        OF NOTE: Prakash scored 16 on PHQ 9, endorsing suicidality. Declined nurse triage today. Working with a therapist, seeing them once a week. Currently feels safe. Denies any active plans to hurt himself or end his life. Crisis resources provided.           49 minutes spent by me on the date of the encounter doing chart review, history and exam, documentation and further activities per the note    New Medical Weight Management Consult    PATIENT:  Prakash Prasad  MRN:         [id number]  :         1990  GIO:         2025    Dear PCP,    I had the pleasure of seeing your patient, Prakash Prasad. Full intake/assessment was done to determine barriers to weight loss success and develop a treatment plan. Prakash Prasad is a 34 year old adult interested in treatment of medical problems associated with excess weight. He has a height of 5' 6.5\", a weight of 225 lbs 0 oz, and the calculated Body mass index is 35.77 kg/m .            Assessment & Plan  Problem List Items Addressed This Visit       Class 2 obesity due to excess calories without serious comorbidity with body mass index (BMI) of 36.0 to 36.9 in adult    Relevant Orders    Adult Sleep Evaluation & Management  Referral        Plan  No medications started per Randall's preference- very briefly discussed ozempic and naltrexone, however Prakash is not interested in injectable medications at this time   Referral placed to sleep medicine given Prakash's concern for chronic fatigue, snoring.   Goals we discussed today:   Tracking all food prior to meeting with dietician   MTM pharmacy appointment declined by Prakash today, " could consider in the future to help with type 2 dm, reviewing medications   Follow up with OFELIA in 3 months   Dietician appointment to be rescheduled asap         Potential anti-obesity medications for this patient the future  TOMER Patel discussed, did not go into detail. Prakash was not interested in injectable medications, declined learning more about this medication. Could revisit in the future as he'd likely be an excellent candidate for these medications if he changes his feelings around injectable medications.   Has diagnosis of type 2 diabetes mellitus -last A1c was 7.4   No history of pancreatitis   No personal or family history of medullary thyroid carcinoma  No personal or family history of Multiple Endocrine Neoplasia Type 2  .  NALTREXONE  No history of liver disease  Caution is needed with this medication due to chronic pain, needing opioids in the past for chronic pain. Could consider with caution   .        Contraindicated/Failed Weight loss medications for this patient   PHENTERMINE  Contraindicated due to schizoaffective disorder   .  TOPIRAMATE  Has taken in the past , did not see successful weight loss. Also relatively contraindicated due to notable history of kidney stones that have required surgical removal   .  BUPROPION  Has taken in the past , saw notably worsened mood   .  METFORMIN  Has taken in the past , saw intolerable nausea and diarrhea   .        Prakash Prasad is a 34 year old adult who presents to clinic today for the following health issues.     He has the following co-morbidities:        1/16/2025    10:38 AM   --   I have the following health issues associated with obesity Type II Diabetes    High Blood Pressure    High Cholesterol    GERD (Reflux)    Fatty Liver   I have the following symptoms associated with obesity None of the above           2/18/2021     3:43 PM   Patient Goals   I am interested in having a healthier weight to diminish current health problems: Yes   I  "am interested in having a healthier weight in order to prevent future health problems: Yes   I am interested in having a healthier weight in order to have a future surgery: Yes   If yes, please indicate which surgery? Top Surgery           1/16/2025    10:38 AM   Referring Provider   Please name the provider who referred you to Medical Weight Management  If you do not know, please answer \"I Don't Know\" Becki Avery       Overweight onset in his early 20s. Does not recall his weight age 18, but does not recall feeling concerned about it. Saw weight gain with increased eating of less nutritious foods, psych medication adjustments (stopping and restarting stimulant medications for adhd led to big swings in appetite). Experienced a spinal cord injury age 26 which impeded his ability to move, further weight gain with this more sedentary lifestyle.    Weight gain has overall been gradual.   Met with Dr. Hays 2/19/21- advised stopping seroquel at that time, was not able to do it at that time due to mental health concerns. Did not follow up with Dr. Hays after this.      Highest weight in life was 240lbs, has since lost down to 225lbs, though he denies any intentional changes to his lifestyle that would contribute to this weight loss.     Comorbidities associated with weight gain include type 2 diabetes, HTN, hypertrigylceridemia.   Additional health issues include schizoaffective disorder, bipolar disorder, history of nephrolithiasis, some residual issues with cauda equina (pain, occasional urinary retention), history of vaping (using niccotine gum), history of gabapentin use. He is also a transgender male, and takes testosterone for this.    Per chart, there is a history of heroin abuse however Prakash denies ever using heroin in his life. Also per chart has history of dysmenorrhea, Prakash denies this, states he hasn't menstruated in a few years.       Current type 2 dm:   Jardiance-10mg   Rybelsus- 7mg  Most recent " A1c (4 months ago) was 7.4, up from 6.7.   Is not checking his blood sugar regularly     Motivators for weight loss include improving health, feeling better overall.     He is interested in starting a medication as a tool for working towards sustainable weight loss.    Regarding eating patterns and diet, he generally snacks throughout the day, mostly at night, doesn't have the desire right now to have full meals. Craves sugar . Is able to get full. Can stay full until next meal. In the past would struggle with portion control, but feels this has improved over the last 2 weeks, though he isn't sure why. Does not experience food noise. Does at times experience emotional eating (boredom). Does not experience a loss of control around eating .    Eats out/ gets take out less than once a month. Drinks water throughout the day. Coffee with creamer at times, not lately. No pop. No juice. No ETOH.     Regarding activity, is currently on disability. Has a lot of hobbies and interests but struggles to engage in these lately due to worsened mental health. Likes drawing, coloring, charcoil, pastels. Likes reading. Has a dog named CrowdClock. Used to play hockey and tennis, ran track and field. Liked these things, though now largely limited due to chronic pain.      Regarding sleep- struggles to sleep due to chronic pain. Is getting a new mattress delivered today so hoping his sleep will improve. Estimates getting 4 hours per night, plus naps during the day that will last  minutes. Does snore, has never had a sleep study.     Past  AOMs   Wellbutrin- notably worsened mood, crying   Metformin- stopped due to nausea, diarrhea   Topiramate- was not helpful for appetite supression, has been several years since he tried this. Cannot recall adverse side effects             1/16/2025    10:38 AM   Weight History   How concerned are you about your weight? Very Concerned   I became overweight As an Adult   The following factors have  contributed to my weight gain Mental Health Issues    Started on Medication that Caused Weight Gain    A Health Crisis    Eating Too Much    Lack of Exercise    Stress   I have tried the following methods to lose weight Watching Portions or Calories    Exercise    Medications    Fasting   My highest weight since age 18 was 240   I have the following family history of obesity/being overweight I am the only one in my immediate family who is overweight           1/16/2025    10:38 AM   Diet Recall Review with Patient   How often do you have a drink of alcohol? Never           1/16/2025    10:38 AM   Eating Habits   Generally, my meals include foods like these bread, pasta, rice, potatoes, corn, crackers, sweet dessert, pop, or juice Less Than Weekly   Generally, my meals include foods like these fried meats, brats, burgers, french fries, pizza, cheese, chips, or ice cream Less Than Weekly   Eat fast food (like McDonalds, [employer], Taco Bell) Never   Eat at a buffet or sit-down restaurant Never   Eat most of my meals in front of the TV or computer Never   Often skip meals, eat at random times, have no regular eating times Everyday   Rarely sit down for a meal but snack or graze throughout Never   Eat extra snacks between meals Never   Eat most of my food at the end of the day Almost Everyday   Eat in the middle of the night or wake up at night to eat Never   Eat extra snacks to prevent or correct low blood sugar Never   Eat to prevent acid reflux or stomach pain Never   Worry about not having enough food to eat Never   I eat when I am depressed Never   I eat when I am stressed Never   I eat when I am bored Never   I eat when I am anxious Never   I eat when I am happy or as a reward Never   I feel hungry all the time even if I just have eaten Never   Feeling full is important to me Never   I finish all the food on my plate even if I am already full Never   I can't resist eating delicious food or walk past the good  food/smell Never   I eat/snack without noticing that I am eating Never   I eat when I am preparing the meal Never   I eat more than usual when I see others eating Never   I have trouble not eating sweets, ice cream, cookies, or chips if they are around the house Never   I think about food all day Never   What foods, if any, do you crave? None           1/16/2025    10:38 AM   Amount of Food   I feel out of control when eating Never   I eat a large amount of food, like a loaf of bread, a box of cookies, a pint/quart of ice cream, all at once Never   I eat a large amount of food even when I am not hungry Never   I eat rapidly Never   I eat alone because I feel embarrassed and do not want others to see how much I have eaten Never   I eat until I am uncomfortably full Never   I feel bad, disgusted, or guilty after I overeat Never           1/16/2025    10:38 AM   Activity/Exercise History   How much of a typical 12 hour day do you spend sitting? Half the Day   How much of a typical 12 hour day do you spend lying down? Less Than Half the Day   How much of a typical day do you spend walking/standing? Less Than Half the Day   How many hours (not including work) do you spend on the TV/Video Games/Computer/Tablet/Phone? 6 Hours or More   How many times a week are you active for the purpose of exercise? Once a Week   What keeps you from being more active? Pain    Other   How many total minutes do you spend doing some activity for the purpose of exercising when you exercise? Less Than 15 Minutes       PAST MEDICAL HISTORY:  Past Medical History:   Diagnosis Date     ADHD (attention deficit hyperactivity disorder)      Bipolar 1 disorder      Borderline personality disorder      Cauda equina syndrome      Chronic low back pain      Depression      Diabetes mellitus, type 2 (H) 1/19/2023     GERD (gastroesophageal reflux disease)      h/o TBI (traumatic brain injury)      Hypertension, unspecified type 12/16/2021     Marginal  corneal ulcer, left 07/17/2015     Nephrolithiasis      obesity      Polysubstance abuse - methamphetamine, hallucinagen, heroin, marijuana     currently in remission     PONV (postoperative nausea and vomiting)      PTSD (post-traumatic stress disorder)            1/16/2025    10:38 AM   Work/Social History Reviewed With Patient   My employment status is Disabled       Marijuana use - medical marijuana, not using lately but previously using every day. Does endorse increase in appetite when using   Alcohol use - denies   Caffeine use  - coffee occasionally             1/16/2025    10:38 AM   Mental Health History Reviewed With Patient   How often in the past 2 weeks have you felt little interest or pleasure in doing things? More Than Half the Days   Over the past 2 weeks how often have you felt down, depressed, or hopeless? More Than Half the Days             2/18/2021     3:43 PM   Sleep History Reviewed With Patient   How many hours do you sleep at night? 12   Do you think that you snore loudly or has anybody ever heard you snore loudly (louder than talking or so loud it can be heard behind a shut door)? No   Has anyone seen or heard you stop breathing during your sleep? No   Do you often feel tired, fatigued, or sleepy during the day? No   Do you have a TV/Computer in your bedroom? Yes         MEDICATIONS:   Current Outpatient Medications   Medication Sig Dispense Refill     acetaminophen (TYLENOL) 325 MG tablet Take 2 tablets (650 mg) by mouth 3 times daily as needed for mild pain (to moderate pain). 180 tablet 0     amLODIPine (NORVASC) 5 MG tablet Take 1 tablet (5 mg) by mouth daily.       artificial saliva (BIOTENE MT) SOLN solution Swish and spit 1 spray in mouth 4 times daily as needed for dry mouth. 44.3 mL 0     blood glucose (NO BRAND SPECIFIED) test strip Use to test blood sugar one times daily and as needed. To accompany: Blood Glucose Monitor Brands: per insurance.       clindamycin phos-benzoyl perox  (DUAC) 1.2-5 % external gel Apply topically daily.       cloNIDine (CATAPRES) 0.1 MG tablet Take 1 tablet (0.1 mg) by mouth 2 times daily as needed (anxiety, hot flashes). 60 tablet 0     diclofenac (VOLTAREN) 1 % topical gel Apply 2 g topically 4 times daily. 350 g 0     empagliflozin (JARDIANCE) 10 MG TABS tablet Take 1 tablet (10 mg) by mouth daily.       hydrOXYzine HCl (ATARAX) 25 MG tablet Take 1 tablet (25 mg) by mouth 3 times daily as needed for anxiety (For anxiety and difficulty sleeping.). 90 tablet 0     hydrOXYzine HCl (ATARAX) 50 MG tablet Take 1.5 tablets (75 mg) by mouth at bedtime. 45 tablet 0     ibuprofen (ADVIL/MOTRIN) 400 MG tablet Take 1 tablet (400 mg) by mouth 3 times daily as needed for inflammatory pain. 90 tablet 0     insulin pen needle (32G X 4 MM) 32G X 4 MM miscellaneous Use 1 pen needles daily or as directed.       LORazepam (ATIVAN) 0.5 MG tablet Take 1 tablet (0.5 mg) by mouth 2 times daily as needed for anxiety. Do not take concurrently with Belbuca. 60 tablet 0     melatonin 3 MG tablet Take 1 tablet (3 mg) by mouth nightly as needed for sleep. 30 tablet 0     menthol (ICY HOT) 5 % PTCH Apply 1 patch over 8 hours topically 3 times daily as needed for muscle soreness or moderate pain. 90 patch 0     nicotine polacrilex (NICORETTE) 4 MG gum Place 1 each (4 mg) inside cheek every hour as needed for nicotine withdrawal symptoms. 100 each 0     omeprazole (PRILOSEC) 20 MG DR capsule Take 1 capsule (20 mg) by mouth daily.       ondansetron (ZOFRAN ODT) 4 MG ODT tab Take 1 tablet (4 mg) by mouth every 8 hours as needed for nausea.       rosuvastatin (CRESTOR) 40 MG tablet Take 1 tablet (40 mg) by mouth daily.       Semaglutide (RYBELSUS) 7 MG tablet Take 1 tablet (7 mg) by mouth daily.       Semaglutide (RYBELSUS) 7 MG tablet Take 1 tablet (7 mg) by mouth daily.       sertraline (ZOLOFT) 50 MG tablet Take 1 tablet (50 mg) by mouth daily. 30 tablet 0     testosterone (ANDROGEL/TESTIM) 50  "MG/5GM (1%) topical gel Place 2 packets (100 mg of testosterone) onto the skin daily.       thin (NO BRAND SPECIFIED) lancets Use with lanceting device to check blood sugars once per day. To accompany: Blood Glucose Monitor Brands: per insurance.       valACYclovir (VALTREX) 500 MG tablet Take 1 tablet (500 mg) by mouth daily.       valproic acid (DEPAKENE) 250 MG/5ML syrup Take 10 mLs (500 mg) by mouth 3 times daily. 946 mL 0           ALLERGIES:   Allergies   Allergen Reactions     Haldol [Haloperidol] Other (See Comments)     Makes patient very angry and anxious     Adhesive Tape Hives     Prednisone Other (See Comments) and Hives     Suicidal ideation     Droperidol Anxiety           4/15/2013     4:00 PM 1/16/2025    10:26 AM   CATIE Score (Last Two)   CATIE Raw Score 52 29    Activation Score 100 52.9    CATIE Level 4 2        Patient-reported               Objective   Ht 1.689 m (5' 6.5\")   Wt 102.1 kg (225 lb)   BMI 35.77 kg/m    Vitals - Patient Reported  Pain Score: Mild Pain (3)  Pain Loc: Low Back      Vitals:  No vitals were obtained today due to virtual visit.    Physical Exam   GENERAL: alert and no distress  EYES: Eyes grossly normal to inspection.  No discharge or erythema, or obvious scleral/conjunctival abnormalities.  RESP: No audible wheeze, cough, or visible cyanosis.    SKIN: Visible skin clear. No significant rash, abnormal pigmentation or lesions.  NEURO: Cranial nerves grossly intact.  Mentation and speech appropriate for age.  PSYCH: Appropriate affect, tone, and pace of words     Anti-obesity medication ROS:    HEENT  Hx of glaucoma: No    Cardiovascular  CAD:No  HTN:Yes      Gastrointestinal  GERD:Yes - well managed with omeprazole 20mg daily   Constipation:No  Liver Dz:No  H/O Pancreatitis:No    Psychiatric  Bipolar: Yes  Anxiety:Yes  Depression:Yes  History of alcohol/drug abuse: Yes  Hx of eating disorder:No    Endocrine  Personal or family hx of MTC or MEN2:No  Diabetes/prediabetes: " Yes    Neurologic:  Hx of seizures: No  Hx of migraines: No  Memory Impairment: No  CVA history: No      History of kidney stones: Yes several, has needed them surgically removed in the past   Kidney disease: Yes ckd stage 2   Current birth control: not sexually active, taking testosterone, has not menstruated for a few years   Taking Opioid/Narcotic:  in the past for cauda-equina related pain, not currently         Sincerely,    Shanice Veliz PA-C     The longitudinal plan of care for the diagnosis(es)/condition(s) as documented were addressed during this visit. Due to the added complexity in care, I will continue to support Randall in the subsequent management and with ongoing continuity of care.     Virtual Visit Details    Type of service:  Video Visit   Video Start Time:  11:55am  Video End Time: 12:39pm    Originating Location (pt. Location): Home    Distant Location (provider location):  Off-site  Platform used for Video Visit: AmWell      Again, thank you for allowing me to participate in the care of your patient.      Sincerely,    Shanice Veliz PA-C

## 2025-01-18 NOTE — PROGRESS NOTES
Present admission as evidenced by tachycardia and leukopenia.  No source of infection identified.  Chest x-ray negative for pneumonia.  UA unremarkable.  Likely secondary to hepatic encephalopathy.  No evidence of SBP.  Empirically on IV ceftriaxone   Does have worsening lactic acidosis.   Likely in the setting of GI bleed.   Pt initially refused invitation to join dance/movement therapy (D/MT) appearing distraught after a phone call.  He was unresponsive to verbal interaction.  This therapist began session in the group room with the door open.  When pt appeared more settled, therapist repeated the invitation to join the D/MT session in a new role that is familiar to pt from previous hospitalizations.  He then joined the session but stated it was due to poor relations with a peer that he was reluctant to join.  Pt engaged in primarily verbal psychotherapeutic interventions and requested 1:1 time with this therapist.  This was able to be accommodated for approximately ten minutes and pt was receptive to this.  He did explain further the frustrations on the phone call were with his mother as she appeared to be setting some boundaries with her support.  Pt was receptive to some reframing of that interaction.  He also described his interest in an IOP program and learning more DBT skills.         08/24/23 1315   Expressive Therapy   Therapy Type dance/movement   Minutes of Treatment 35

## 2025-01-22 ENCOUNTER — TELEPHONE (OUTPATIENT)
Dept: GASTROENTEROLOGY | Facility: CLINIC | Age: 35
End: 2025-01-22
Payer: MEDICARE

## 2025-01-22 NOTE — TELEPHONE ENCOUNTER
Caller: Prakash Prasad     Reason for Reschedule/Cancellation (please be detailed, any staff messages or encounters to note?):   Per patient family ermergency    Did you cancel or rescheduled an EUS procedure? No.    Is screening questionnaire older than 3 months from the reschedule date.   If Yes, please complete screening questionnaire. No    Prior to reschedule please review:  Ordering Provider: Gena Rojas Determined: mac  Does patient have any ASC Exclusions, please identify?: No    Notes on Cancelled Procedure:  Procedure: Upper Endoscopy [EGD]   Date: 1/29  Location: [facility]; [facility] [address] [place] [address]   Surgeon: Law    Rescheduled: No,

## 2025-01-23 ENCOUNTER — TELEPHONE (OUTPATIENT)
Dept: BEHAVIORAL HEALTH | Facility: CLINIC | Age: 35
End: 2025-01-23
Payer: MEDICARE

## 2025-01-23 NOTE — TELEPHONE ENCOUNTER
VAL on File for Jie Childs.   RN called CM and informed her that patient cancelled [facility] appointment tomorrow.   Clinic calling to inform CM as patient is on civil committment.   RN provided call back number for CM to call clinic for any follow up questions or concerns.      Herman TOM RN   Nurse Liaison.   ----- Message -----   From: Becki Sommers APRN CNP   Sent: 1/23/2025   3:07 PM CST   To: [facility] Rn Pool   Subject: FW: Appointment canceled                          1.   If VAL on file for : Jie David with [facility] [phone], then nursing to call/update  patient cancelled [facility] on 1/24/2024 since currently on civil commitment.   ----- Message -----   From: Herman Waddell RN   Sent: 1/23/2025  12:17 PM CST   To: BROOKLYN Wilkerson CNP   Subject: FW: Appointment canceled                          1/24/25 [facility] administration appointment cancelled by patient.   Patient appears to be on commitment with expiry 12/2/25   Routing to Becki Sommers CNP for review.   ----- Message -----   From: Francisco Palomino   Sent: 1/22/2025   3:48 PM CST   To: BROOKLYN Wilkerson CNP; *   Subject: FW: Appointment canceled                          Just giving a FYI that patient cancelled the injection for this FRIDAY 1/24 @1pm.   ----- Message -----   From: Rachel Landeros RN   Sent: 1/22/2025   3:25 PM CST   To: [facility] Ob Pool   Subject: FW: Appointment canceled                             ----- Message -----   From: Prakash Prasad   Sent: 1/22/2025   2:59 PM CST   To: [facility] Rn Pool   Subject: Appointment canceled                              Appointment canceled for Prakash Prasad ([id number])   Visit type: INJECTION   1/24/2025 1:00 PM (30 minutes) with [facility] NURSE in [facility] NURSE      Reason for cancellation: Other

## 2025-01-27 DIAGNOSIS — E78.5 HYPERLIPIDEMIA LDL GOAL <100: ICD-10-CM

## 2025-01-27 RX ORDER — ROSUVASTATIN CALCIUM 40 MG/1
40 TABLET, COATED ORAL DAILY
Qty: 90 TABLET | Refills: 1 | Status: SHIPPED | OUTPATIENT
Start: 2025-01-27
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
## 2025-02-04 ENCOUNTER — OFFICE VISIT (OUTPATIENT)
Dept: FAMILY MEDICINE | Facility: CLINIC | Age: 35
End: 2025-02-04
Payer: MEDICARE

## 2025-02-04 ENCOUNTER — ANCILLARY PROCEDURE (OUTPATIENT)
Dept: GENERAL RADIOLOGY | Facility: CLINIC | Age: 35
End: 2025-02-04
Attending: PHYSICIAN ASSISTANT
Payer: MEDICARE

## 2025-02-04 VITALS
SYSTOLIC BLOOD PRESSURE: 128 MMHG | DIASTOLIC BLOOD PRESSURE: 96 MMHG | BODY MASS INDEX: 35.09 KG/M2 | TEMPERATURE: 98.1 F | OXYGEN SATURATION: 96 % | WEIGHT: 223.6 LBS | HEART RATE: 76 BPM | HEIGHT: 67 IN | RESPIRATION RATE: 20 BRPM

## 2025-02-04 DIAGNOSIS — M25.512 ACUTE PAIN OF LEFT SHOULDER: ICD-10-CM
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
## 2025-02-04 DIAGNOSIS — M25.512 ACUTE PAIN OF LEFT SHOULDER: Primary | ICD-10-CM

## 2025-02-04 PROCEDURE — 99214 OFFICE O/P EST MOD 30 MIN: CPT | Performed by: PHYSICIAN ASSISTANT

## 2025-02-04 PROCEDURE — 73030 X-RAY EXAM OF SHOULDER: CPT | Mod: TC | Performed by: INTERNAL MEDICINE

## 2025-02-04 ASSESSMENT — PAIN SCALES - GENERAL: PAINLEVEL_OUTOF10: MODERATE PAIN (5)
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
## 2025-02-04 NOTE — PATIENT INSTRUCTIONS
Your x-ray is reassuring and does not show signs of fracture/broken bones.  I suspect your pain is from soft tissue injury/bruising from your injury.  For treatment, I placed a referral to physical therapy.  Scheduling will call you to set this up.  You could also use ice and Tylenol/ibuprofen.  If your symptoms are not improving within the next 2-3 weeks, please reach out for follow-up and we would consider an MRI at that point.  Follow-up sooner for new or worsening symptoms.

## 2025-02-05 NOTE — PLAN OF CARE
Acute on chronic combined HFpEF and HFrEF exacerbation, acute blood loss anemia secondary to suspected upper GI bleed Acute on chronic combined HFpEF and HFrEF exacerbation, acute blood loss anemia secondary to suspected upper GI bleed Acute on chronic combined HFpEF and HFrEF exacerbation, acute blood loss anemia secondary to suspected upper GI bleed Acute on chronic combined HFpEF and HFrEF exacerbation, acute blood loss anemia secondary to suspected upper GI bleed Acute on chronic combined HFpEF and HFrEF exacerbation, acute blood loss anemia secondary to suspected upper GI bleed Acute on chronic combined HFpEF and HFrEF exacerbation, acute blood loss anemia secondary to suspected upper GI bleed Acute on chronic combined HFpEF and HFrEF exacerbation, acute blood loss anemia secondary to suspected upper GI bleed Acute on chronic combined HFpEF and HFrEF exacerbation, acute blood loss anemia secondary to suspected upper GI bleed Acute on chronic combined HFpEF and HFrEF exacerbation, acute blood loss anemia secondary to suspected upper GI bleed Acute on chronic combined HFpEF and HFrEF exacerbation, acute blood loss anemia secondary to suspected upper GI bleed Acute on chronic combined HFpEF and HFrEF exacerbation, acute blood loss anemia secondary to suspected upper GI bleed Acute on chronic combined HFpEF and HFrEF exacerbation, acute blood loss anemia secondary to suspected upper GI bleed Acute on chronic combined HFpEF and HFrEF exacerbation, acute blood loss anemia secondary to suspected upper GI bleed Acute on chronic combined HFpEF and HFrEF exacerbation, acute blood loss anemia secondary to suspected upper GI bleed Acute on chronic combined HFpEF and HFrEF exacerbation, acute blood loss anemia secondary to suspected upper GI bleed Acute on chronic combined HFpEF and HFrEF exacerbation, acute blood loss anemia secondary to suspected upper GI bleed Acute on chronic combined HFpEF and HFrEF exacerbation, acute blood loss anemia secondary to suspected upper GI bleed Acute on chronic combined HFpEF and HFrEF exacerbation, acute blood loss anemia secondary to suspected upper GI bleed Acute on chronic combined HFpEF and HFrEF exacerbation, acute blood loss anemia secondary to suspected upper GI bleed Acute on chronic combined HFpEF and HFrEF exacerbation, acute blood loss anemia secondary to suspected upper GI bleed Acute on chronic combined HFpEF and HFrEF exacerbation, acute blood loss anemia secondary to suspected upper GI bleed Acute on chronic combined HFpEF and HFrEF exacerbation, acute blood loss anemia secondary to suspected upper GI bleed Acute on chronic combined HFpEF and HFrEF exacerbation, acute blood loss anemia secondary to suspected upper GI bleed Assessment/Intervention, Care Coordination and Current Symptoms:   CTC spoke with patient's  regarding the revocation of provisional discharge paperwork. Papers were emailed to writer and a copy was given to the patient. CM reported that her team continues to work on a placement with another group home. CTC heard from the patient's current . She clarified patient reports patient made about getting kicked-out of his group home. She reported that was not the case and he would be welcomed back if he chooses to return. Patient denied new or worsening psych symptoms when speaking with writer. Patient was calm, cooperative, and pleasant when working with CTC today. CTC, patient's CM, and patient agreed to a care conference early next week.      Discharge Plan or Goal:  Group Home     Barriers to Discharge:  commitment, symptom stabilization, medication management, coordination of care     Referral Status:    12/2/21 - [facility]. ( out-of-office until Monday, December 6).      Legal Status:  commitment (revocation of provisional discharge received 12/3/21)     Dom Carnes MA, [facility], 12/3/2021, 2:55 PM     Acute on chronic combined HFpEF and HFrEF exacerbation, acute blood loss anemia secondary to suspected upper GI bleed Acute on chronic combined HFpEF and HFrEF exacerbation, acute blood loss anemia secondary to suspected upper GI bleed Acute on chronic combined HFpEF and HFrEF exacerbation, acute blood loss anemia secondary to suspected upper GI bleed Acute on chronic combined HFpEF and HFrEF exacerbation, acute blood loss anemia secondary to suspected upper GI bleed Acute on chronic combined HFpEF and HFrEF exacerbation, acute blood loss anemia secondary to suspected upper GI bleed Acute on chronic combined HFpEF and HFrEF exacerbation, acute blood loss anemia secondary to suspected upper GI bleed Acute on chronic combined HFpEF and HFrEF exacerbation, acute blood loss anemia secondary to suspected upper GI bleed Acute on chronic combined HFpEF and HFrEF exacerbation, acute blood loss anemia secondary to suspected upper GI bleed Acute on chronic combined HFpEF and HFrEF exacerbation, acute blood loss anemia secondary to suspected upper GI bleed Acute on chronic combined HFpEF and HFrEF exacerbation, acute blood loss anemia secondary to suspected upper GI bleed Acute on chronic combined HFpEF and HFrEF exacerbation, acute blood loss anemia secondary to suspected upper GI bleed Acute on chronic combined HFpEF and HFrEF exacerbation, acute blood loss anemia secondary to suspected upper GI bleed Acute on chronic combined HFpEF and HFrEF exacerbation, acute blood loss anemia secondary to suspected upper GI bleed Acute on chronic combined HFpEF and HFrEF exacerbation, acute blood loss anemia secondary to suspected upper GI bleed Acute on chronic combined HFpEF and HFrEF exacerbation, acute blood loss anemia secondary to suspected upper GI bleed Acute on chronic combined HFpEF and HFrEF exacerbation, acute blood loss anemia secondary to suspected upper GI bleed Acute on chronic combined HFpEF and HFrEF exacerbation, acute blood loss anemia secondary to suspected upper GI bleed Acute on chronic combined HFpEF and HFrEF exacerbation, acute blood loss anemia secondary to suspected upper GI bleed Acute on chronic combined HFpEF and HFrEF exacerbation, acute blood loss anemia secondary to suspected upper GI bleed Acute on chronic combined HFpEF and HFrEF exacerbation, acute blood loss anemia secondary to suspected upper GI bleed Acute on chronic combined HFpEF and HFrEF exacerbation, acute blood loss anemia secondary to suspected upper GI bleed Acute on chronic combined HFpEF and HFrEF exacerbation, acute blood loss anemia secondary to suspected upper GI bleed Acute on chronic combined HFpEF and HFrEF exacerbation, acute blood loss anemia secondary to suspected upper GI bleed Acute on chronic combined HFpEF and HFrEF exacerbation, acute blood loss anemia secondary to suspected upper GI bleed Acute on chronic combined HFpEF and HFrEF exacerbation, acute blood loss anemia secondary to suspected upper GI bleed Acute on chronic combined HFpEF and HFrEF exacerbation, acute blood loss anemia secondary to suspected upper GI bleed Acute on chronic combined HFpEF and HFrEF exacerbation, acute blood loss anemia secondary to suspected upper GI bleed Acute on chronic combined HFpEF and HFrEF exacerbation, acute blood loss anemia secondary to suspected upper GI bleed Acute on chronic combined HFpEF and HFrEF exacerbation, acute blood loss anemia secondary to suspected upper GI bleed Acute on chronic combined HFpEF and HFrEF exacerbation, acute blood loss anemia secondary to suspected upper GI bleed Acute on chronic combined HFpEF and HFrEF exacerbation, acute blood loss anemia secondary to suspected upper GI bleed Acute on chronic combined HFpEF and HFrEF exacerbation, acute blood loss anemia secondary to suspected upper GI bleed Acute on chronic combined HFpEF and HFrEF exacerbation, acute blood loss anemia secondary to suspected upper GI bleed Acute on chronic combined HFpEF and HFrEF exacerbation, acute blood loss anemia secondary to suspected upper GI bleed Acute on chronic combined HFpEF and HFrEF exacerbation, acute blood loss anemia secondary to suspected upper GI bleed Acute on chronic combined HFpEF and HFrEF exacerbation, acute blood loss anemia secondary to suspected upper GI bleed Acute on chronic combined HFpEF and HFrEF exacerbation, acute blood loss anemia secondary to suspected upper GI bleed Acute on chronic combined HFpEF and HFrEF exacerbation, acute blood loss anemia secondary to suspected upper GI bleed Acute on chronic combined HFpEF and HFrEF exacerbation, acute blood loss anemia secondary to suspected upper GI bleed Acute on chronic combined HFpEF and HFrEF exacerbation, acute blood loss anemia secondary to suspected upper GI bleed Acute on chronic combined HFpEF and HFrEF exacerbation, acute blood loss anemia secondary to suspected upper GI bleed Acute on chronic combined HFpEF and HFrEF exacerbation, acute blood loss anemia secondary to suspected upper GI bleed Acute on chronic combined HFpEF and HFrEF exacerbation, acute blood loss anemia secondary to suspected upper GI bleed Acute on chronic combined HFpEF and HFrEF exacerbation, acute blood loss anemia secondary to suspected upper GI bleed Acute on chronic combined HFpEF and HFrEF exacerbation, acute blood loss anemia secondary to suspected upper GI bleed Acute on chronic combined HFpEF and HFrEF exacerbation, acute blood loss anemia secondary to suspected upper GI bleed Acute on chronic combined HFpEF and HFrEF exacerbation, acute blood loss anemia secondary to suspected upper GI bleed Acute on chronic combined HFpEF and HFrEF exacerbation, acute blood loss anemia secondary to suspected upper GI bleed Acute on chronic combined HFpEF and HFrEF exacerbation, acute blood loss anemia secondary to suspected upper GI bleed Acute on chronic combined HFpEF and HFrEF exacerbation, acute blood loss anemia secondary to suspected upper GI bleed Acute on chronic combined HFpEF and HFrEF exacerbation, acute blood loss anemia secondary to suspected upper GI bleed

## 2025-03-25 NOTE — ANESTHESIA PREPROCEDURE EVALUATION
Anesthesia Evaluation     . Pt has had prior anesthetic. Type: General and MAC           ROS/MED HX    ENT/Pulmonary:     (+)tobacco use, , . Other pulmonary disease recent upper respiratory infection.    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      Psychiatric:     (+) psychiatric history anxiety      Infectious Disease:  - neg infectious disease ROS       Malignancy:      - no malignancy   Other:    - neg other ROS                 Physical Exam  Normal systems: cardiovascular, pulmonary and dental    Airway   Mallampati: II  TM distance: >3 FB  Neck ROM: full    Dental     Cardiovascular       Pulmonary                     Anesthesia Plan      History & Physical Review  History and physical reviewed and following examination; no interval change.    ASA Status:  2 .    NPO Status:  > 8 hours    Plan for General, LMA and MAC with Intravenous and Propofol induction.     Additional equipment: Videolaryngoscope      Postoperative Care  Postoperative pain management:  IV analgesics and Oral pain medications.      Consents  Anesthetic plan, risks, benefits and alternatives discussed with:  Patient..                          .  
3 = A little assistance
